# Patient Record
Sex: FEMALE | Race: BLACK OR AFRICAN AMERICAN | NOT HISPANIC OR LATINO
[De-identification: names, ages, dates, MRNs, and addresses within clinical notes are randomized per-mention and may not be internally consistent; named-entity substitution may affect disease eponyms.]

---

## 2017-01-03 ENCOUNTER — APPOINTMENT (OUTPATIENT)
Dept: PULMONOLOGY | Facility: CLINIC | Age: 67
End: 2017-01-03

## 2017-01-03 VITALS — DIASTOLIC BLOOD PRESSURE: 64 MMHG | SYSTOLIC BLOOD PRESSURE: 118 MMHG

## 2017-01-03 VITALS — OXYGEN SATURATION: 97 % | DIASTOLIC BLOOD PRESSURE: 70 MMHG | HEART RATE: 82 BPM | SYSTOLIC BLOOD PRESSURE: 140 MMHG

## 2017-01-03 RX ORDER — OMALIZUMAB 202.5 MG/1.4ML
150 INJECTION, SOLUTION SUBCUTANEOUS
Qty: 45 | Refills: 0 | Status: COMPLETED | OUTPATIENT
Start: 2017-01-03

## 2017-01-03 RX ADMIN — OMALIZUMAB 0 MG: 202.5 INJECTION, SOLUTION SUBCUTANEOUS at 00:00

## 2017-01-04 ENCOUNTER — APPOINTMENT (OUTPATIENT)
Dept: PULMONOLOGY | Facility: CLINIC | Age: 67
End: 2017-01-04

## 2017-01-05 ENCOUNTER — APPOINTMENT (OUTPATIENT)
Dept: PULMONOLOGY | Facility: CLINIC | Age: 67
End: 2017-01-05

## 2017-01-17 ENCOUNTER — MED ADMIN CHARGE (OUTPATIENT)
Age: 67
End: 2017-01-17

## 2017-01-17 ENCOUNTER — APPOINTMENT (OUTPATIENT)
Dept: PULMONOLOGY | Facility: CLINIC | Age: 67
End: 2017-01-17

## 2017-01-17 VITALS
RESPIRATION RATE: 17 BRPM | BODY MASS INDEX: 27 KG/M2 | HEIGHT: 67 IN | SYSTOLIC BLOOD PRESSURE: 125 MMHG | HEART RATE: 71 BPM | DIASTOLIC BLOOD PRESSURE: 70 MMHG | OXYGEN SATURATION: 97 % | WEIGHT: 172 LBS

## 2017-01-17 RX ORDER — OMALIZUMAB 202.5 MG/1.4ML
150 INJECTION, SOLUTION SUBCUTANEOUS
Qty: 45 | Refills: 0 | Status: COMPLETED | OUTPATIENT
Start: 2017-01-17

## 2017-01-17 RX ADMIN — OMALIZUMAB 0 MG: 202.5 INJECTION, SOLUTION SUBCUTANEOUS at 00:00

## 2017-01-26 ENCOUNTER — OUTPATIENT (OUTPATIENT)
Dept: OUTPATIENT SERVICES | Facility: HOSPITAL | Age: 67
LOS: 1 days | End: 2017-01-26
Payer: MEDICARE

## 2017-01-26 ENCOUNTER — APPOINTMENT (OUTPATIENT)
Dept: THORACIC SURGERY | Facility: CLINIC | Age: 67
End: 2017-01-26

## 2017-01-26 VITALS
RESPIRATION RATE: 16 BRPM | HEART RATE: 80 BPM | OXYGEN SATURATION: 93 % | SYSTOLIC BLOOD PRESSURE: 150 MMHG | DIASTOLIC BLOOD PRESSURE: 67 MMHG | BODY MASS INDEX: 27.62 KG/M2 | TEMPERATURE: 98.6 F | HEIGHT: 67 IN | WEIGHT: 176 LBS

## 2017-01-26 DIAGNOSIS — Z98.89 OTHER SPECIFIED POSTPROCEDURAL STATES: Chronic | ICD-10-CM

## 2017-01-26 DIAGNOSIS — H05.352 EXOSTOSIS OF LEFT ORBIT: Chronic | ICD-10-CM

## 2017-01-26 DIAGNOSIS — Z96.653 PRESENCE OF ARTIFICIAL KNEE JOINT, BILATERAL: Chronic | ICD-10-CM

## 2017-01-26 PROCEDURE — 71020: CPT | Mod: 26

## 2017-01-26 PROCEDURE — 71046 X-RAY EXAM CHEST 2 VIEWS: CPT

## 2017-01-31 ENCOUNTER — APPOINTMENT (OUTPATIENT)
Dept: PULMONOLOGY | Facility: CLINIC | Age: 67
End: 2017-01-31

## 2017-01-31 VITALS
SYSTOLIC BLOOD PRESSURE: 140 MMHG | WEIGHT: 176 LBS | HEART RATE: 74 BPM | HEIGHT: 67 IN | OXYGEN SATURATION: 94 % | DIASTOLIC BLOOD PRESSURE: 70 MMHG | BODY MASS INDEX: 27.62 KG/M2 | RESPIRATION RATE: 17 BRPM

## 2017-01-31 RX ORDER — OMALIZUMAB 202.5 MG/1.4ML
150 INJECTION, SOLUTION SUBCUTANEOUS
Qty: 45 | Refills: 0 | Status: COMPLETED | OUTPATIENT
Start: 2017-01-31

## 2017-01-31 RX ADMIN — OMALIZUMAB 0 MG: 202.5 INJECTION, SOLUTION SUBCUTANEOUS at 00:00

## 2017-02-03 ENCOUNTER — INPATIENT (INPATIENT)
Facility: HOSPITAL | Age: 67
LOS: 7 days | Discharge: HOME | DRG: 871 | End: 2017-02-11
Attending: INTERNAL MEDICINE | Admitting: INTERNAL MEDICINE
Payer: MEDICARE

## 2017-02-03 VITALS
SYSTOLIC BLOOD PRESSURE: 136 MMHG | RESPIRATION RATE: 20 BRPM | OXYGEN SATURATION: 95 % | DIASTOLIC BLOOD PRESSURE: 83 MMHG | HEART RATE: 109 BPM | TEMPERATURE: 101 F

## 2017-02-03 DIAGNOSIS — J18.1 LOBAR PNEUMONIA, UNSPECIFIED ORGANISM: ICD-10-CM

## 2017-02-03 DIAGNOSIS — E11.9 TYPE 2 DIABETES MELLITUS WITHOUT COMPLICATIONS: ICD-10-CM

## 2017-02-03 DIAGNOSIS — Z98.89 OTHER SPECIFIED POSTPROCEDURAL STATES: Chronic | ICD-10-CM

## 2017-02-03 DIAGNOSIS — I48.91 UNSPECIFIED ATRIAL FIBRILLATION: ICD-10-CM

## 2017-02-03 DIAGNOSIS — Z96.653 PRESENCE OF ARTIFICIAL KNEE JOINT, BILATERAL: Chronic | ICD-10-CM

## 2017-02-03 DIAGNOSIS — H05.352 EXOSTOSIS OF LEFT ORBIT: Chronic | ICD-10-CM

## 2017-02-03 LAB
ALBUMIN SERPL ELPH-MCNC: 3.8 G/DL — SIGNIFICANT CHANGE UP (ref 3.3–5)
ALP SERPL-CCNC: 110 U/L — SIGNIFICANT CHANGE UP (ref 40–120)
ALT FLD-CCNC: 15 U/L RC — SIGNIFICANT CHANGE UP (ref 10–45)
ANION GAP SERPL CALC-SCNC: 12 MMOL/L — SIGNIFICANT CHANGE UP (ref 5–17)
APTT BLD: 38.5 SEC — HIGH (ref 27.5–37.4)
AST SERPL-CCNC: 16 U/L — SIGNIFICANT CHANGE UP (ref 10–40)
BASOPHILS # BLD AUTO: 0 K/UL — SIGNIFICANT CHANGE UP (ref 0–0.2)
BASOPHILS NFR BLD AUTO: 0.1 % — SIGNIFICANT CHANGE UP (ref 0–2)
BILIRUB SERPL-MCNC: 0.3 MG/DL — SIGNIFICANT CHANGE UP (ref 0.2–1.2)
BUN SERPL-MCNC: 9 MG/DL — SIGNIFICANT CHANGE UP (ref 7–23)
CALCIUM SERPL-MCNC: 8.8 MG/DL — SIGNIFICANT CHANGE UP (ref 8.4–10.5)
CHLORIDE SERPL-SCNC: 101 MMOL/L — SIGNIFICANT CHANGE UP (ref 96–108)
CO2 SERPL-SCNC: 27 MMOL/L — SIGNIFICANT CHANGE UP (ref 22–31)
CREAT SERPL-MCNC: 0.66 MG/DL — SIGNIFICANT CHANGE UP (ref 0.5–1.3)
DIGOXIN SERPL-MCNC: 0.7 NG/ML — LOW (ref 0.8–2)
EOSINOPHIL # BLD AUTO: 0.3 K/UL — SIGNIFICANT CHANGE UP (ref 0–0.5)
EOSINOPHIL NFR BLD AUTO: 1.4 % — SIGNIFICANT CHANGE UP (ref 0–6)
GAS PNL BLDV: SIGNIFICANT CHANGE UP
GLUCOSE SERPL-MCNC: 166 MG/DL — HIGH (ref 70–99)
HCT VFR BLD CALC: 34.1 % — LOW (ref 34.5–45)
HGB BLD-MCNC: 10.5 G/DL — LOW (ref 11.5–15.5)
INR BLD: 1.37 RATIO — HIGH (ref 0.88–1.16)
LYMPHOCYTES # BLD AUTO: 21.6 % — SIGNIFICANT CHANGE UP (ref 13–44)
LYMPHOCYTES # BLD AUTO: 4.3 K/UL — HIGH (ref 1–3.3)
MCHC RBC-ENTMCNC: 20.9 PG — LOW (ref 27–34)
MCHC RBC-ENTMCNC: 30.7 GM/DL — LOW (ref 32–36)
MCV RBC AUTO: 68.1 FL — LOW (ref 80–100)
MONOCYTES # BLD AUTO: 2 K/UL — HIGH (ref 0–0.9)
MONOCYTES NFR BLD AUTO: 10.2 % — SIGNIFICANT CHANGE UP (ref 2–14)
NEUTROPHILS # BLD AUTO: 13.2 K/UL — HIGH (ref 1.8–7.4)
NEUTROPHILS NFR BLD AUTO: 66.8 % — SIGNIFICANT CHANGE UP (ref 43–77)
PLATELET # BLD AUTO: 419 K/UL — HIGH (ref 150–400)
POTASSIUM SERPL-MCNC: 3.9 MMOL/L — SIGNIFICANT CHANGE UP (ref 3.5–5.3)
POTASSIUM SERPL-SCNC: 3.9 MMOL/L — SIGNIFICANT CHANGE UP (ref 3.5–5.3)
PROT SERPL-MCNC: 6.9 G/DL — SIGNIFICANT CHANGE UP (ref 6–8.3)
PROTHROM AB SERPL-ACNC: 14.8 SEC — HIGH (ref 10–13.1)
RAPID RVP RESULT: SIGNIFICANT CHANGE UP
RBC # BLD: 5 M/UL — SIGNIFICANT CHANGE UP (ref 3.8–5.2)
RBC # FLD: 16.1 % — HIGH (ref 10.3–14.5)
SODIUM SERPL-SCNC: 140 MMOL/L — SIGNIFICANT CHANGE UP (ref 135–145)
WBC # BLD: 19.7 K/UL — HIGH (ref 3.8–10.5)
WBC # FLD AUTO: 19.7 K/UL — HIGH (ref 3.8–10.5)

## 2017-02-03 PROCEDURE — 71010: CPT | Mod: 26

## 2017-02-03 PROCEDURE — 99291 CRITICAL CARE FIRST HOUR: CPT | Mod: 25,GC

## 2017-02-03 PROCEDURE — 99223 1ST HOSP IP/OBS HIGH 75: CPT

## 2017-02-03 PROCEDURE — 71250 CT THORAX DX C-: CPT | Mod: 26

## 2017-02-03 PROCEDURE — 93010 ELECTROCARDIOGRAM REPORT: CPT

## 2017-02-03 RX ORDER — POLYETHYLENE GLYCOL 3350 17 G/17G
17 POWDER, FOR SOLUTION ORAL DAILY
Qty: 0 | Refills: 0 | Status: DISCONTINUED | OUTPATIENT
Start: 2017-02-03 | End: 2017-02-11

## 2017-02-03 RX ORDER — ALBUTEROL 90 UG/1
1 AEROSOL, METERED ORAL EVERY 4 HOURS
Qty: 0 | Refills: 0 | Status: DISCONTINUED | OUTPATIENT
Start: 2017-02-03 | End: 2017-02-11

## 2017-02-03 RX ORDER — INSULIN LISPRO 100/ML
3 VIAL (ML) SUBCUTANEOUS
Qty: 0 | Refills: 0 | Status: DISCONTINUED | OUTPATIENT
Start: 2017-02-03 | End: 2017-02-05

## 2017-02-03 RX ORDER — APIXABAN 2.5 MG/1
5 TABLET, FILM COATED ORAL
Qty: 0 | Refills: 0 | Status: DISCONTINUED | OUTPATIENT
Start: 2017-02-03 | End: 2017-02-11

## 2017-02-03 RX ORDER — MEROPENEM 1 G/30ML
1000 INJECTION INTRAVENOUS ONCE
Qty: 0 | Refills: 0 | Status: COMPLETED | OUTPATIENT
Start: 2017-02-03 | End: 2017-02-03

## 2017-02-03 RX ORDER — DIGOXIN 250 MCG
0.25 TABLET ORAL DAILY
Qty: 0 | Refills: 0 | Status: DISCONTINUED | OUTPATIENT
Start: 2017-02-03 | End: 2017-02-11

## 2017-02-03 RX ORDER — INSULIN LISPRO 100/ML
4 VIAL (ML) SUBCUTANEOUS ONCE
Qty: 0 | Refills: 0 | Status: COMPLETED | OUTPATIENT
Start: 2017-02-03 | End: 2017-02-03

## 2017-02-03 RX ORDER — FLUTICASONE PROPIONATE AND SALMETEROL 50; 250 UG/1; UG/1
1 POWDER ORAL; RESPIRATORY (INHALATION)
Qty: 0 | Refills: 0 | Status: DISCONTINUED | OUTPATIENT
Start: 2017-02-03 | End: 2017-02-03

## 2017-02-03 RX ORDER — VANCOMYCIN HCL 1 G
1000 VIAL (EA) INTRAVENOUS EVERY 12 HOURS
Qty: 0 | Refills: 0 | Status: DISCONTINUED | OUTPATIENT
Start: 2017-02-03 | End: 2017-02-06

## 2017-02-03 RX ORDER — TIOTROPIUM BROMIDE 18 UG/1
1 CAPSULE ORAL; RESPIRATORY (INHALATION) DAILY
Qty: 0 | Refills: 0 | Status: DISCONTINUED | OUTPATIENT
Start: 2017-02-03 | End: 2017-02-11

## 2017-02-03 RX ORDER — SODIUM CHLORIDE 9 MG/ML
1000 INJECTION, SOLUTION INTRAVENOUS
Qty: 0 | Refills: 0 | Status: DISCONTINUED | OUTPATIENT
Start: 2017-02-03 | End: 2017-02-11

## 2017-02-03 RX ORDER — SODIUM CHLORIDE 9 MG/ML
2000 INJECTION INTRAMUSCULAR; INTRAVENOUS; SUBCUTANEOUS ONCE
Qty: 0 | Refills: 0 | Status: COMPLETED | OUTPATIENT
Start: 2017-02-03 | End: 2017-02-03

## 2017-02-03 RX ORDER — FLUTICASONE PROPIONATE AND SALMETEROL 50; 250 UG/1; UG/1
2 POWDER ORAL; RESPIRATORY (INHALATION)
Qty: 0 | Refills: 0 | Status: DISCONTINUED | OUTPATIENT
Start: 2017-02-03 | End: 2017-02-03

## 2017-02-03 RX ORDER — IPRATROPIUM/ALBUTEROL SULFATE 18-103MCG
3 AEROSOL WITH ADAPTER (GRAM) INHALATION EVERY 6 HOURS
Qty: 0 | Refills: 0 | Status: DISCONTINUED | OUTPATIENT
Start: 2017-02-03 | End: 2017-02-11

## 2017-02-03 RX ORDER — ONDANSETRON 8 MG/1
4 TABLET, FILM COATED ORAL EVERY 6 HOURS
Qty: 0 | Refills: 0 | Status: DISCONTINUED | OUTPATIENT
Start: 2017-02-03 | End: 2017-02-11

## 2017-02-03 RX ORDER — VANCOMYCIN HCL 1 G
1000 VIAL (EA) INTRAVENOUS ONCE
Qty: 0 | Refills: 0 | Status: COMPLETED | OUTPATIENT
Start: 2017-02-03 | End: 2017-02-03

## 2017-02-03 RX ORDER — INSULIN GLARGINE 100 [IU]/ML
10 INJECTION, SOLUTION SUBCUTANEOUS AT BEDTIME
Qty: 0 | Refills: 0 | Status: DISCONTINUED | OUTPATIENT
Start: 2017-02-03 | End: 2017-02-05

## 2017-02-03 RX ORDER — LORATADINE 10 MG/1
10 TABLET ORAL DAILY
Qty: 0 | Refills: 0 | Status: DISCONTINUED | OUTPATIENT
Start: 2017-02-03 | End: 2017-02-11

## 2017-02-03 RX ORDER — DEXTROSE 50 % IN WATER 50 %
25 SYRINGE (ML) INTRAVENOUS ONCE
Qty: 0 | Refills: 0 | Status: DISCONTINUED | OUTPATIENT
Start: 2017-02-03 | End: 2017-02-11

## 2017-02-03 RX ORDER — MONTELUKAST 4 MG/1
10 TABLET, CHEWABLE ORAL DAILY
Qty: 0 | Refills: 0 | Status: DISCONTINUED | OUTPATIENT
Start: 2017-02-03 | End: 2017-02-11

## 2017-02-03 RX ORDER — DEXTROSE 50 % IN WATER 50 %
12.5 SYRINGE (ML) INTRAVENOUS ONCE
Qty: 0 | Refills: 0 | Status: DISCONTINUED | OUTPATIENT
Start: 2017-02-03 | End: 2017-02-11

## 2017-02-03 RX ORDER — INSULIN LISPRO 100/ML
VIAL (ML) SUBCUTANEOUS
Qty: 0 | Refills: 0 | Status: DISCONTINUED | OUTPATIENT
Start: 2017-02-03 | End: 2017-02-11

## 2017-02-03 RX ORDER — ALPRAZOLAM 0.25 MG
0.5 TABLET ORAL THREE TIMES A DAY
Qty: 0 | Refills: 0 | Status: DISCONTINUED | OUTPATIENT
Start: 2017-02-03 | End: 2017-02-10

## 2017-02-03 RX ORDER — HYDROCORTISONE 1 %
1 OINTMENT (GRAM) TOPICAL DAILY
Qty: 0 | Refills: 0 | Status: DISCONTINUED | OUTPATIENT
Start: 2017-02-03 | End: 2017-02-11

## 2017-02-03 RX ORDER — PANTOPRAZOLE SODIUM 20 MG/1
40 TABLET, DELAYED RELEASE ORAL
Qty: 0 | Refills: 0 | Status: DISCONTINUED | OUTPATIENT
Start: 2017-02-03 | End: 2017-02-11

## 2017-02-03 RX ORDER — DEXTROSE 50 % IN WATER 50 %
1 SYRINGE (ML) INTRAVENOUS ONCE
Qty: 0 | Refills: 0 | Status: DISCONTINUED | OUTPATIENT
Start: 2017-02-03 | End: 2017-02-11

## 2017-02-03 RX ORDER — DOCUSATE SODIUM 100 MG
100 CAPSULE ORAL
Qty: 0 | Refills: 0 | Status: DISCONTINUED | OUTPATIENT
Start: 2017-02-03 | End: 2017-02-11

## 2017-02-03 RX ORDER — FLUTICASONE PROPIONATE AND SALMETEROL 50; 250 UG/1; UG/1
1 POWDER ORAL; RESPIRATORY (INHALATION)
Qty: 0 | Refills: 0 | Status: DISCONTINUED | OUTPATIENT
Start: 2017-02-03 | End: 2017-02-11

## 2017-02-03 RX ORDER — GLUCAGON INJECTION, SOLUTION 0.5 MG/.1ML
1 INJECTION, SOLUTION SUBCUTANEOUS ONCE
Qty: 0 | Refills: 0 | Status: DISCONTINUED | OUTPATIENT
Start: 2017-02-03 | End: 2017-02-11

## 2017-02-03 RX ORDER — MEROPENEM 1 G/30ML
1000 INJECTION INTRAVENOUS EVERY 8 HOURS
Qty: 0 | Refills: 0 | Status: DISCONTINUED | OUTPATIENT
Start: 2017-02-03 | End: 2017-02-11

## 2017-02-03 RX ADMIN — Medication 250 MILLIGRAM(S): at 17:52

## 2017-02-03 RX ADMIN — POLYETHYLENE GLYCOL 3350 17 GRAM(S): 17 POWDER, FOR SOLUTION ORAL at 22:06

## 2017-02-03 RX ADMIN — Medication 4 UNIT(S): at 22:19

## 2017-02-03 RX ADMIN — Medication 1 SUPPOSITORY(S): at 23:26

## 2017-02-03 RX ADMIN — Medication 100 MILLIGRAM(S): at 17:52

## 2017-02-03 RX ADMIN — Medication 0.5 MILLIGRAM(S): at 22:20

## 2017-02-03 RX ADMIN — Medication 150 MILLIGRAM(S): at 17:52

## 2017-02-03 RX ADMIN — Medication 250 MILLIGRAM(S): at 12:30

## 2017-02-03 RX ADMIN — MEROPENEM 200 MILLIGRAM(S): 1 INJECTION INTRAVENOUS at 14:37

## 2017-02-03 RX ADMIN — Medication 40 MILLIGRAM(S): at 12:28

## 2017-02-03 RX ADMIN — APIXABAN 5 MILLIGRAM(S): 2.5 TABLET, FILM COATED ORAL at 17:54

## 2017-02-03 RX ADMIN — INSULIN GLARGINE 10 UNIT(S): 100 INJECTION, SOLUTION SUBCUTANEOUS at 22:03

## 2017-02-03 RX ADMIN — Medication 3: at 17:52

## 2017-02-03 RX ADMIN — MEROPENEM 200 MILLIGRAM(S): 1 INJECTION INTRAVENOUS at 22:19

## 2017-02-03 RX ADMIN — Medication 3 UNIT(S): at 17:52

## 2017-02-03 RX ADMIN — Medication 3 MILLILITER(S): at 19:41

## 2017-02-03 RX ADMIN — FLUTICASONE PROPIONATE AND SALMETEROL 1 DOSE(S): 50; 250 POWDER ORAL; RESPIRATORY (INHALATION) at 22:13

## 2017-02-03 RX ADMIN — SODIUM CHLORIDE 2000 MILLILITER(S): 9 INJECTION INTRAMUSCULAR; INTRAVENOUS; SUBCUTANEOUS at 12:20

## 2017-02-03 RX ADMIN — Medication 0.25 MILLIGRAM(S): at 17:52

## 2017-02-03 NOTE — ED ADULT NURSE REASSESSMENT NOTE - NS ED NURSE REASSESS COMMENT FT1
Pharmacy paged to send Meropenum to the ED, awaiting the medication via the tubing system. Will continue to monitor pt.

## 2017-02-03 NOTE — ED PROVIDER NOTE - FAMILY HISTORY
<<-----Click on this checkbox to enter Family History Family history of asthma     Sibling  Still living? Unknown  Family history of breast cancer, Age at diagnosis: Age Unknown

## 2017-02-03 NOTE — ED PROVIDER NOTE - OBJECTIVE STATEMENT
67 yo Fwith PMH of COPD/asthma and tracheomalacia s/p intubation and cardiac arrest (remote hx),  Afib on pradaxa, Adrenal Insuffiiency,  DM2,  who presents to the hospital with 2 week history of shortness of breath.  Patient was recently in ed with PNA treated with meropenem and discharged home.  She had returned to her normal state of health and had been going to outpatient PT.  Patient reportedly began having increased shortness of breath and productive cough with yellow sputum.  patient noted a fever today to 101, so she came to the hospital for further care.  No chest pain, headache, lightheadedness, dizziness, abdomianl pain, nausea, vomiting, diarrhea, constipation or dysuria.

## 2017-02-03 NOTE — ED ADULT NURSE NOTE - PMH
Adrenal insufficiency    Aortic insufficiency  mod/severe AI on echo 9/25/16  Asthma    Atrial fibrillation    COPD (chronic obstructive pulmonary disease)    Diabetes  type two. insulin dependent  Pelvic fracture

## 2017-02-03 NOTE — ED PROVIDER NOTE - PROGRESS NOTE DETAILS
Dr. Anne Note: pt appears improved, no respiratory distress, updated on results and need for admission for pneumonia, called pt's pulmonologist, Dr. Allison, will send associate to see patient later today for further recommendations, stable for admission.

## 2017-02-03 NOTE — ED PROVIDER NOTE - MEDICAL DECISION MAKING DETAILS
Pt with complex PMH, recent hospitalization for PNA.  Will check labs, CXR and CT chest,  will aggressively hydrate, panculture, empiric antibiotics, stress steroids

## 2017-02-03 NOTE — ED PROVIDER NOTE - ATTENDING CONTRIBUTION TO CARE
I, Dr. Ahmet Anne, interviewed the patient and performed a physical exam and spoke to the resident about the plan of care for this patient.  Pt with productive cough with blood-tinged sputum today assoc with dyspnea and fever.  Pt appears dry, no respiratory distress, coarse bs.  Pt has extensive medical/pulmonary disease, on chronic steroids, on a/c for afib.

## 2017-02-03 NOTE — ED PROVIDER NOTE - CRITICAL CARE PROVIDED
consultation with other physicians/interpretation of diagnostic studies/direct patient care (not related to procedure)/additional history taking/documentation

## 2017-02-03 NOTE — ED ADULT NURSE NOTE - PSH
Exostosis of orbit, left    H/O pelvic surgery    H/O total knee replacement, bilateral    History of partial hysterectomy    History of sinus surgery

## 2017-02-03 NOTE — H&P ADULT. - ASSESSMENT
66 yr F with PMH of COPD, asthma and tracheomalacia s/p intubation and cardiac arrest in the past , Afib on pradaxa, Adrenal Insuffiiency,  DM2,  who presents to the hospital with 2 week history of shortness of breath.  Patient was recently in ed with PNA treated with meropenem and discharged home.  She had returned to her normal state of health and had been going to outpatient PT.  Patient reportedly began having increased shortness of breath and productive cough with yellow sputum.  patient noted a fever today to 101, so she came to the hospital for further care.  No chest pain, headache, lightheadedness, dizziness, abdominal pain, nausea, vomiting, diarrhea, constipation or dysuria.

## 2017-02-03 NOTE — ED ADULT NURSE NOTE - OBJECTIVE STATEMENT
65 y/o F, reported to ED from home. A&Ox3, c/o fever. Pt reports that she woke up this morning with chills. Pt took her own temperature and it was 102. Pt came to ED with a temp of 101. Pt reports that she has been experiencing increased SOB over the past 3 days. Pt denies LOC, H/A, C/P, V/D, abd pain. Pt denies urinary symptoms. Pt reports some coughing and reports coughing up a small dot of blood 1x yesterday and 1x today. Pt denies recent sick contact or travel. Pt reports some increase pain in the right lower leg beneath a wound that was treated with skin graft. Pt denies taking medication prior to arrival to the ED. Pt has a hx of aortic tear, skin graft to the right lower leg and bronchial repair. Daughter at bedside. Will continue to monitor pt.

## 2017-02-04 LAB
ALBUMIN SERPL ELPH-MCNC: 3.3 G/DL — SIGNIFICANT CHANGE UP (ref 3.3–5)
ALP SERPL-CCNC: 103 U/L — SIGNIFICANT CHANGE UP (ref 40–120)
ALT FLD-CCNC: 13 U/L — SIGNIFICANT CHANGE UP (ref 10–45)
ANION GAP SERPL CALC-SCNC: 15 MMOL/L — SIGNIFICANT CHANGE UP (ref 5–17)
AST SERPL-CCNC: 16 U/L — SIGNIFICANT CHANGE UP (ref 10–40)
BILIRUB SERPL-MCNC: 0.2 MG/DL — SIGNIFICANT CHANGE UP (ref 0.2–1.2)
BUN SERPL-MCNC: 11 MG/DL — SIGNIFICANT CHANGE UP (ref 7–23)
CALCIUM SERPL-MCNC: 8.9 MG/DL — SIGNIFICANT CHANGE UP (ref 8.4–10.5)
CHLORIDE SERPL-SCNC: 103 MMOL/L — SIGNIFICANT CHANGE UP (ref 96–108)
CO2 SERPL-SCNC: 24 MMOL/L — SIGNIFICANT CHANGE UP (ref 22–31)
CREAT SERPL-MCNC: 0.65 MG/DL — SIGNIFICANT CHANGE UP (ref 0.5–1.3)
GLUCOSE SERPL-MCNC: 217 MG/DL — HIGH (ref 70–99)
GRAM STN FLD: SIGNIFICANT CHANGE UP
HBA1C BLD-MCNC: 7.9 % — HIGH (ref 4–5.6)
HCT VFR BLD CALC: 30 % — LOW (ref 34.5–45)
HGB BLD-MCNC: 9.1 G/DL — LOW (ref 11.5–15.5)
MCHC RBC-ENTMCNC: 20.6 PG — LOW (ref 27–34)
MCHC RBC-ENTMCNC: 30.3 GM/DL — LOW (ref 32–36)
MCV RBC AUTO: 67.9 FL — LOW (ref 80–100)
PLATELET # BLD AUTO: 387 K/UL — SIGNIFICANT CHANGE UP (ref 150–400)
POTASSIUM SERPL-MCNC: 4.2 MMOL/L — SIGNIFICANT CHANGE UP (ref 3.5–5.3)
POTASSIUM SERPL-SCNC: 4.2 MMOL/L — SIGNIFICANT CHANGE UP (ref 3.5–5.3)
PROT SERPL-MCNC: 6.3 G/DL — SIGNIFICANT CHANGE UP (ref 6–8.3)
RBC # BLD: 4.42 M/UL — SIGNIFICANT CHANGE UP (ref 3.8–5.2)
RBC # FLD: 17.3 % — HIGH (ref 10.3–14.5)
SODIUM SERPL-SCNC: 142 MMOL/L — SIGNIFICANT CHANGE UP (ref 135–145)
SPECIMEN SOURCE: SIGNIFICANT CHANGE UP
WBC # BLD: 20.13 K/UL — HIGH (ref 3.8–10.5)
WBC # FLD AUTO: 20.13 K/UL — HIGH (ref 3.8–10.5)

## 2017-02-04 RX ORDER — INSULIN LISPRO 100/ML
4 VIAL (ML) SUBCUTANEOUS ONCE
Qty: 0 | Refills: 0 | Status: COMPLETED | OUTPATIENT
Start: 2017-02-04 | End: 2017-02-04

## 2017-02-04 RX ORDER — SODIUM CHLORIDE 9 MG/ML
4 INJECTION INTRAMUSCULAR; INTRAVENOUS; SUBCUTANEOUS
Qty: 0 | Refills: 0 | Status: DISCONTINUED | OUTPATIENT
Start: 2017-02-04 | End: 2017-02-11

## 2017-02-04 RX ADMIN — MONTELUKAST 10 MILLIGRAM(S): 4 TABLET, CHEWABLE ORAL at 12:30

## 2017-02-04 RX ADMIN — Medication 3 MILLILITER(S): at 02:07

## 2017-02-04 RX ADMIN — Medication 1: at 12:29

## 2017-02-04 RX ADMIN — Medication 150 MILLIGRAM(S): at 17:36

## 2017-02-04 RX ADMIN — INSULIN GLARGINE 10 UNIT(S): 100 INJECTION, SOLUTION SUBCUTANEOUS at 21:53

## 2017-02-04 RX ADMIN — Medication 100 MILLIGRAM(S): at 05:58

## 2017-02-04 RX ADMIN — Medication 20 MILLIGRAM(S): at 12:29

## 2017-02-04 RX ADMIN — Medication 100 MILLIGRAM(S): at 17:36

## 2017-02-04 RX ADMIN — Medication 250 MILLIGRAM(S): at 17:36

## 2017-02-04 RX ADMIN — Medication 4 UNIT(S): at 22:58

## 2017-02-04 RX ADMIN — Medication 2: at 17:36

## 2017-02-04 RX ADMIN — Medication 20 MILLIGRAM(S): at 21:47

## 2017-02-04 RX ADMIN — Medication 1 SUPPOSITORY(S): at 21:48

## 2017-02-04 RX ADMIN — POLYETHYLENE GLYCOL 3350 17 GRAM(S): 17 POWDER, FOR SOLUTION ORAL at 21:42

## 2017-02-04 RX ADMIN — Medication 3 UNIT(S): at 08:00

## 2017-02-04 RX ADMIN — MEROPENEM 200 MILLIGRAM(S): 1 INJECTION INTRAVENOUS at 08:12

## 2017-02-04 RX ADMIN — Medication 250 MILLIGRAM(S): at 05:57

## 2017-02-04 RX ADMIN — FLUTICASONE PROPIONATE AND SALMETEROL 1 DOSE(S): 50; 250 POWDER ORAL; RESPIRATORY (INHALATION) at 21:43

## 2017-02-04 RX ADMIN — Medication 3 UNIT(S): at 17:36

## 2017-02-04 RX ADMIN — Medication 2: at 08:00

## 2017-02-04 RX ADMIN — FLUTICASONE PROPIONATE AND SALMETEROL 1 DOSE(S): 50; 250 POWDER ORAL; RESPIRATORY (INHALATION) at 05:59

## 2017-02-04 RX ADMIN — MEROPENEM 200 MILLIGRAM(S): 1 INJECTION INTRAVENOUS at 21:48

## 2017-02-04 RX ADMIN — Medication 3 MILLILITER(S): at 12:29

## 2017-02-04 RX ADMIN — PANTOPRAZOLE SODIUM 40 MILLIGRAM(S): 20 TABLET, DELAYED RELEASE ORAL at 06:38

## 2017-02-04 RX ADMIN — APIXABAN 5 MILLIGRAM(S): 2.5 TABLET, FILM COATED ORAL at 17:36

## 2017-02-04 RX ADMIN — MEROPENEM 200 MILLIGRAM(S): 1 INJECTION INTRAVENOUS at 12:30

## 2017-02-04 RX ADMIN — LORATADINE 10 MILLIGRAM(S): 10 TABLET ORAL at 12:30

## 2017-02-04 RX ADMIN — Medication 0.25 MILLIGRAM(S): at 06:05

## 2017-02-04 RX ADMIN — Medication 3 UNIT(S): at 12:29

## 2017-02-04 RX ADMIN — Medication 3 MILLILITER(S): at 19:55

## 2017-02-04 RX ADMIN — APIXABAN 5 MILLIGRAM(S): 2.5 TABLET, FILM COATED ORAL at 05:57

## 2017-02-04 RX ADMIN — Medication 150 MILLIGRAM(S): at 06:05

## 2017-02-04 NOTE — DIETITIAN INITIAL EVALUATION ADULT. - OTHER INFO
Patient referred for A1c greater than 7.  Last A1c from October was 8.5.  Patient found sitting at bedside.  Somewhat irritable and frustrated with hospitalization.  Requests to have her prednisone ordered as well as her A1c be checked.  Appetite and intake is good.  Patient is very much in tune to diabetes, insulin, finger sticks and CHO counting.  Patient reports finger sticks at home are well controlled and she has an ongoing relationship with her endocrinologist. Insulin to CHO ratio is 1:20.  Patient is very frustrated with elevated fingersticks in hospital.  Patient eats 6 small meals per day and ALWAYS includes protein with her CHO. Eats very little bread, pasta, rice.  CHO source is usually fruit.  Patient very knowledgeable.  Requests an evening snack.  Reviewed protein choices with patient and advised her on food availability.  Weight has been fluctuating and was as low as 156 pounds last August- October due to illness.  Now back up to more normal weight range.

## 2017-02-04 NOTE — DIETITIAN INITIAL EVALUATION ADULT. - ENERGY NEEDS
HT 67.5 inches,  pounds,  pounds,  pounds.  BMI 26.8  Dxd with sepsis, PNA.  Hx of diabetes.    Skin intact.

## 2017-02-04 NOTE — DIETITIAN INITIAL EVALUATION ADULT. - NS AS NUTRI INTERV ED CONTENT
Reviewed diabetes management and reinforced positive behaviors and lifestyle management../Nutrition relationship to health/disease

## 2017-02-05 LAB
HCT VFR BLD CALC: 30.8 % — LOW (ref 34.5–45)
HGB BLD-MCNC: 9.1 G/DL — LOW (ref 11.5–15.5)
LEGIONELLA AG UR QL: NEGATIVE — SIGNIFICANT CHANGE UP
MCHC RBC-ENTMCNC: 20.3 PG — LOW (ref 27–34)
MCHC RBC-ENTMCNC: 29.5 GM/DL — LOW (ref 32–36)
MCV RBC AUTO: 68.6 FL — LOW (ref 80–100)
PLATELET # BLD AUTO: 435 K/UL — HIGH (ref 150–400)
RBC # BLD: 4.49 M/UL — SIGNIFICANT CHANGE UP (ref 3.8–5.2)
RBC # FLD: 17.1 % — HIGH (ref 10.3–14.5)
VANCOMYCIN TROUGH SERPL-MCNC: 6.3 UG/ML — LOW (ref 10–20)
WBC # BLD: 13.81 K/UL — HIGH (ref 3.8–10.5)
WBC # FLD AUTO: 13.81 K/UL — HIGH (ref 3.8–10.5)

## 2017-02-05 RX ORDER — INSULIN GLARGINE 100 [IU]/ML
15 INJECTION, SOLUTION SUBCUTANEOUS AT BEDTIME
Qty: 0 | Refills: 0 | Status: DISCONTINUED | OUTPATIENT
Start: 2017-02-05 | End: 2017-02-07

## 2017-02-05 RX ORDER — INSULIN LISPRO 100/ML
VIAL (ML) SUBCUTANEOUS AT BEDTIME
Qty: 0 | Refills: 0 | Status: DISCONTINUED | OUTPATIENT
Start: 2017-02-05 | End: 2017-02-11

## 2017-02-05 RX ORDER — POLYETHYLENE GLYCOL 3350 17 G/17G
17 POWDER, FOR SOLUTION ORAL ONCE
Qty: 0 | Refills: 0 | Status: COMPLETED | OUTPATIENT
Start: 2017-02-05 | End: 2017-02-05

## 2017-02-05 RX ORDER — INSULIN LISPRO 100/ML
5 VIAL (ML) SUBCUTANEOUS
Qty: 0 | Refills: 0 | Status: DISCONTINUED | OUTPATIENT
Start: 2017-02-05 | End: 2017-02-07

## 2017-02-05 RX ADMIN — Medication 3 UNIT(S): at 12:41

## 2017-02-05 RX ADMIN — Medication 3: at 22:28

## 2017-02-05 RX ADMIN — FLUTICASONE PROPIONATE AND SALMETEROL 1 DOSE(S): 50; 250 POWDER ORAL; RESPIRATORY (INHALATION) at 21:19

## 2017-02-05 RX ADMIN — SODIUM CHLORIDE 4 MILLILITER(S): 9 INJECTION INTRAMUSCULAR; INTRAVENOUS; SUBCUTANEOUS at 17:19

## 2017-02-05 RX ADMIN — Medication 3 UNIT(S): at 08:46

## 2017-02-05 RX ADMIN — Medication 2: at 17:17

## 2017-02-05 RX ADMIN — SODIUM CHLORIDE 4 MILLILITER(S): 9 INJECTION INTRAMUSCULAR; INTRAVENOUS; SUBCUTANEOUS at 08:47

## 2017-02-05 RX ADMIN — Medication 150 MILLIGRAM(S): at 17:25

## 2017-02-05 RX ADMIN — MEROPENEM 200 MILLIGRAM(S): 1 INJECTION INTRAVENOUS at 21:19

## 2017-02-05 RX ADMIN — POLYETHYLENE GLYCOL 3350 17 GRAM(S): 17 POWDER, FOR SOLUTION ORAL at 09:21

## 2017-02-05 RX ADMIN — MEROPENEM 200 MILLIGRAM(S): 1 INJECTION INTRAVENOUS at 05:34

## 2017-02-05 RX ADMIN — Medication 250 MILLIGRAM(S): at 05:35

## 2017-02-05 RX ADMIN — Medication 100 MILLIGRAM(S): at 17:19

## 2017-02-05 RX ADMIN — Medication 3 MILLILITER(S): at 21:11

## 2017-02-05 RX ADMIN — LORATADINE 10 MILLIGRAM(S): 10 TABLET ORAL at 12:09

## 2017-02-05 RX ADMIN — Medication 20 MILLIGRAM(S): at 05:36

## 2017-02-05 RX ADMIN — Medication 1 SUPPOSITORY(S): at 21:12

## 2017-02-05 RX ADMIN — Medication 3 MILLILITER(S): at 01:22

## 2017-02-05 RX ADMIN — MONTELUKAST 10 MILLIGRAM(S): 4 TABLET, CHEWABLE ORAL at 12:09

## 2017-02-05 RX ADMIN — Medication 250 MILLIGRAM(S): at 17:25

## 2017-02-05 RX ADMIN — APIXABAN 5 MILLIGRAM(S): 2.5 TABLET, FILM COATED ORAL at 05:37

## 2017-02-05 RX ADMIN — Medication 0.25 MILLIGRAM(S): at 05:39

## 2017-02-05 RX ADMIN — MEROPENEM 200 MILLIGRAM(S): 1 INJECTION INTRAVENOUS at 15:44

## 2017-02-05 RX ADMIN — Medication 4: at 08:45

## 2017-02-05 RX ADMIN — FLUTICASONE PROPIONATE AND SALMETEROL 1 DOSE(S): 50; 250 POWDER ORAL; RESPIRATORY (INHALATION) at 05:35

## 2017-02-05 RX ADMIN — Medication 20 MILLIGRAM(S): at 15:42

## 2017-02-05 RX ADMIN — Medication 0.5 MILLIGRAM(S): at 22:47

## 2017-02-05 RX ADMIN — PANTOPRAZOLE SODIUM 40 MILLIGRAM(S): 20 TABLET, DELAYED RELEASE ORAL at 06:58

## 2017-02-05 RX ADMIN — Medication 150 MILLIGRAM(S): at 05:39

## 2017-02-05 RX ADMIN — POLYETHYLENE GLYCOL 3350 17 GRAM(S): 17 POWDER, FOR SOLUTION ORAL at 23:09

## 2017-02-05 RX ADMIN — Medication 3 MILLILITER(S): at 08:47

## 2017-02-05 RX ADMIN — Medication 4: at 12:41

## 2017-02-05 RX ADMIN — Medication 3 MILLILITER(S): at 15:42

## 2017-02-05 RX ADMIN — APIXABAN 5 MILLIGRAM(S): 2.5 TABLET, FILM COATED ORAL at 17:19

## 2017-02-05 RX ADMIN — Medication 20 MILLIGRAM(S): at 21:20

## 2017-02-05 RX ADMIN — Medication 100 MILLIGRAM(S): at 05:36

## 2017-02-05 RX ADMIN — INSULIN GLARGINE 15 UNIT(S): 100 INJECTION, SOLUTION SUBCUTANEOUS at 22:46

## 2017-02-05 RX ADMIN — Medication 0.5 MILLIGRAM(S): at 00:46

## 2017-02-05 RX ADMIN — Medication 3 UNIT(S): at 17:18

## 2017-02-05 NOTE — PROVIDER CONTACT NOTE (OTHER) - ASSESSMENT
Pt alert and oriented x3, No respiratory distress noted. Pt requesting albuterol 90mcg HFA inhaler every 4 hours PRN.

## 2017-02-06 ENCOUNTER — TRANSCRIPTION ENCOUNTER (OUTPATIENT)
Age: 67
End: 2017-02-06

## 2017-02-06 LAB
CULTURE RESULTS: SIGNIFICANT CHANGE UP
HCT VFR BLD CALC: 31.5 % — LOW (ref 34.5–45)
HGB BLD-MCNC: 9.5 G/DL — LOW (ref 11.5–15.5)
MCHC RBC-ENTMCNC: 20.3 PG — LOW (ref 27–34)
MCHC RBC-ENTMCNC: 30.2 GM/DL — LOW (ref 32–36)
MCV RBC AUTO: 67.5 FL — LOW (ref 80–100)
PLATELET # BLD AUTO: 459 K/UL — HIGH (ref 150–400)
RBC # BLD: 4.67 M/UL — SIGNIFICANT CHANGE UP (ref 3.8–5.2)
RBC # FLD: 17.3 % — HIGH (ref 10.3–14.5)
SPECIMEN SOURCE: SIGNIFICANT CHANGE UP
WBC # BLD: 15.35 K/UL — HIGH (ref 3.8–10.5)
WBC # FLD AUTO: 15.35 K/UL — HIGH (ref 3.8–10.5)

## 2017-02-06 PROCEDURE — 99222 1ST HOSP IP/OBS MODERATE 55: CPT | Mod: GC

## 2017-02-06 PROCEDURE — 99233 SBSQ HOSP IP/OBS HIGH 50: CPT

## 2017-02-06 RX ORDER — HYDROCORTISONE 1 %
1 OINTMENT (GRAM) TOPICAL
Qty: 0 | Refills: 0 | Status: DISCONTINUED | OUTPATIENT
Start: 2017-02-06 | End: 2017-02-11

## 2017-02-06 RX ADMIN — Medication 5 UNIT(S): at 08:23

## 2017-02-06 RX ADMIN — Medication 100 MILLIGRAM(S): at 06:05

## 2017-02-06 RX ADMIN — APIXABAN 5 MILLIGRAM(S): 2.5 TABLET, FILM COATED ORAL at 17:42

## 2017-02-06 RX ADMIN — Medication 3 MILLILITER(S): at 15:19

## 2017-02-06 RX ADMIN — Medication 2: at 17:41

## 2017-02-06 RX ADMIN — LORATADINE 10 MILLIGRAM(S): 10 TABLET ORAL at 12:03

## 2017-02-06 RX ADMIN — MONTELUKAST 10 MILLIGRAM(S): 4 TABLET, CHEWABLE ORAL at 12:03

## 2017-02-06 RX ADMIN — Medication 0.25 MILLIGRAM(S): at 06:06

## 2017-02-06 RX ADMIN — POLYETHYLENE GLYCOL 3350 17 GRAM(S): 17 POWDER, FOR SOLUTION ORAL at 08:54

## 2017-02-06 RX ADMIN — SODIUM CHLORIDE 4 MILLILITER(S): 9 INJECTION INTRAMUSCULAR; INTRAVENOUS; SUBCUTANEOUS at 21:41

## 2017-02-06 RX ADMIN — Medication 3 MILLILITER(S): at 21:40

## 2017-02-06 RX ADMIN — INSULIN GLARGINE 15 UNIT(S): 100 INJECTION, SOLUTION SUBCUTANEOUS at 22:40

## 2017-02-06 RX ADMIN — Medication 5 UNIT(S): at 17:41

## 2017-02-06 RX ADMIN — Medication 3 MILLILITER(S): at 08:23

## 2017-02-06 RX ADMIN — MEROPENEM 200 MILLIGRAM(S): 1 INJECTION INTRAVENOUS at 06:03

## 2017-02-06 RX ADMIN — MEROPENEM 200 MILLIGRAM(S): 1 INJECTION INTRAVENOUS at 21:40

## 2017-02-06 RX ADMIN — SODIUM CHLORIDE 4 MILLILITER(S): 9 INJECTION INTRAMUSCULAR; INTRAVENOUS; SUBCUTANEOUS at 08:23

## 2017-02-06 RX ADMIN — Medication 5 UNIT(S): at 12:02

## 2017-02-06 RX ADMIN — Medication 1 SUPPOSITORY(S): at 21:41

## 2017-02-06 RX ADMIN — FLUTICASONE PROPIONATE AND SALMETEROL 1 DOSE(S): 50; 250 POWDER ORAL; RESPIRATORY (INHALATION) at 21:40

## 2017-02-06 RX ADMIN — PANTOPRAZOLE SODIUM 40 MILLIGRAM(S): 20 TABLET, DELAYED RELEASE ORAL at 06:11

## 2017-02-06 RX ADMIN — Medication 150 MILLIGRAM(S): at 17:45

## 2017-02-06 RX ADMIN — APIXABAN 5 MILLIGRAM(S): 2.5 TABLET, FILM COATED ORAL at 06:05

## 2017-02-06 RX ADMIN — Medication 16 MILLIGRAM(S): at 15:22

## 2017-02-06 RX ADMIN — Medication 3 MILLILITER(S): at 01:04

## 2017-02-06 RX ADMIN — Medication 250 MILLIGRAM(S): at 06:16

## 2017-02-06 RX ADMIN — MEROPENEM 200 MILLIGRAM(S): 1 INJECTION INTRAVENOUS at 13:16

## 2017-02-06 RX ADMIN — Medication 0.5 MILLIGRAM(S): at 23:38

## 2017-02-06 RX ADMIN — FLUTICASONE PROPIONATE AND SALMETEROL 1 DOSE(S): 50; 250 POWDER ORAL; RESPIRATORY (INHALATION) at 06:10

## 2017-02-06 RX ADMIN — ALBUTEROL 1 PUFF(S): 90 AEROSOL, METERED ORAL at 12:03

## 2017-02-06 RX ADMIN — Medication 150 MILLIGRAM(S): at 06:06

## 2017-02-06 RX ADMIN — Medication 100 MILLIGRAM(S): at 17:42

## 2017-02-06 RX ADMIN — Medication 3: at 22:40

## 2017-02-06 RX ADMIN — Medication 2: at 12:02

## 2017-02-06 RX ADMIN — Medication 3: at 08:23

## 2017-02-06 NOTE — DISCHARGE NOTE ADULT - CARE PROVIDER_API CALL
Eulalio Allison), Internal Medicine; Pulmonary Disease  [address] [place] [id number]  Phone: [phone]  Fax: [phone] Eulalio Allison), Internal Medicine; Pulmonary Disease  [address] [place] [address]  Phone: [phone]  Fax: [phone]    Lisandra Holloway), Allergy and Immunology  [address] [place] [id number]  Phone: [phone]  Fax: [phone]

## 2017-02-06 NOTE — DISCHARGE NOTE ADULT - CARE PROVIDERS DIRECT ADDRESSES
,[email or web address],DirectAddress_Unknown ,[email or web address],DirectAddress_Unknown

## 2017-02-06 NOTE — DISCHARGE NOTE ADULT - MEDICATION SUMMARY - MEDICATIONS TO CHANGE
I will SWITCH the dose or number of times a day I take the medications listed below when I get home from the hospital:    methylPREDNISolone 4 mg oral tablet  -- 1 tab(s) by mouth once a day

## 2017-02-06 NOTE — DISCHARGE NOTE ADULT - PLAN OF CARE
symptoms improved You will take Ceftin 500mg oral twice a day x 7 days, and then discontinue - take until course is complete  Pneumonia is a lung infection that can cause a fever, cough, and trouble breathing.  Continue all antibiotics as ordered until complete.  Nutrition is important, eat small frequent meals.  Get lots of rest and drink fluids.  Call your health care provider upon arrival home from hospital and make a follow up appointment for one week.  If your cough worsens, you develop fever greater than 101', you have shaking chills, a fast heartbeat, trouble breathing and/or feel your are breathing much faster than usual, call your healthcare provider.  Make sure you wash your hands frequently. Atrial fibrillation is the most common heart rhythm problem.  The condition puts you at risk for has stroke and heart attack  It helps if you control your blood pressure, not drink more than 1-2 alcohol drinks per day, cut down on caffeine, getting treatment for over active thyroid gland, and get regular exercise  Call your doctor if you feel your heart racing or beating unusually, chest tightness or pain, lightheaded, faint, shortness of breath especially with exercise  It is important to take your heart medication as prescribed  You may be on anticoagulation which is very important to take as directed - you may need blood work to monitor drug levels Your steroids were increased during hospitalization - you will take Medrol 8mg oral daily x 5 days (through 2/15/17) and then return to your original dose of 4mg oral daily (starting on 2/16/17).  You will need to follow up with your pulmonologist, Dr. Allison, [phone], within one week of discharge - please call to make an appointment. HgA1C this admission.  Make sure you get your HgA1c checked every three months.  If you take oral diabetes medications, check your blood glucose two times a day.  If you take insulin, check your blood glucose before meals and at bedtime.  It's important not to skip any meals.  Keep a log of your blood glucose results and always take it with you to your doctor appointments.  Keep a list of your current medications including injectables and over the counter medications and bring this medication list with you to all your doctor appointments.  If you have not seen your ophthalmologist this year call for appointment.  Check your feet daily for redness, sores, or openings. Do not self treat. If no improvement in two days call your primary care physician for an appointment.  Low blood sugar (hypoglycemia) is a blood sugar below 70mg/dl. Check your blood sugar if you feel signs/symptoms of hypoglycemia. If your blood sugar is below 70 take 15 grams of carbohydrates (ex 4 oz of apple juice, 3-4 glucose tablets, or 4-6 oz of regular soda) wait 15 minutes and repeat blood sugar to make sure it comes up above 70.  If your blood sugar is above 70 and you are due for a meal, have a meal.  If you are not due for a meal have a snack.  This snack helps keeps your blood sugar at a safe range. You will take Ceftin 500mg oral twice a day x 7 days, and then discontinue - take until course is complete.  Pneumonia is a lung infection that can cause a fever, cough, and trouble breathing.  Continue all antibiotics as ordered until complete.  Nutrition is important, eat small frequent meals.  Get lots of rest and drink fluids.  Call your health care provider upon arrival home from hospital and make a follow up appointment for one week.  If your cough worsens, you develop fever greater than 101', you have shaking chills, a fast heartbeat, trouble breathing and/or feel your are breathing much faster than usual, call your healthcare provider.  Make sure you wash your hands frequently. You will need to follow up with your primary medical doctor/endocrinologist within one week of discharge - please call to make an appointment.  Continue with your current diabetes medication regimen.  HgA1C this admission - 7.9  Make sure you get your HgA1c checked every three months.  If you take oral diabetes medications, check your blood glucose two times a day.  If you take insulin, check your blood glucose before meals and at bedtime.  It's important not to skip any meals.  Keep a log of your blood glucose results and always take it with you to your doctor appointments.  Keep a list of your current medications including injectables and over the counter medications and bring this medication list with you to all your doctor appointments.  If you have not seen your ophthalmologist this year call for appointment.  Check your feet daily for redness, sores, or openings. Do not self treat. If no improvement in two days call your primary care physician for an appointment.  Low blood sugar (hypoglycemia) is a blood sugar below 70mg/dl. Check your blood sugar if you feel signs/symptoms of hypoglycemia. If your blood sugar is below 70 take 15 grams of carbohydrates (ex 4 oz of apple juice, 3-4 glucose tablets, or 4-6 oz of regular soda) wait 15 minutes and repeat blood sugar to make sure it comes up above 70.  If your blood sugar is above 70 and you are due for a meal, have a meal.  If you are not due for a meal have a snack.  This snack helps keeps your blood sugar at a safe range.

## 2017-02-06 NOTE — DISCHARGE NOTE ADULT - HOSPITAL COURSE
***To Be Completed By Attending*** 66 yr F with PMH of COPD, asthma and tracheomalacia s/p intubation and cardiac arrest in the past , Afib on pradaxa, Adrenal Insuffiiency,  DM2,  who presents to the hospital with 2 week history of shortness of breath.  Patient was recently in ed with PNA treated with meropenem and discharged home.  She had returned to her normal state of health and had been going to outpatient PT.  Patient reportedly began having increased shortness of breath and productive cough with yellow sputum.  patient noted a fever today to 101, so she came to the hospital for further care.  No chest pain, headache, lightheadedness, dizziness, abdominal pain, nausea, vomiting, diarrhea, constipation or dysuria.	  Improved on antibiotics  pulmonary, id and immunology fu as outup

## 2017-02-06 NOTE — DISCHARGE NOTE ADULT - ADDITIONAL INSTRUCTIONS
Your steroids were increased during hospitalization - you will take Medrol 8mg oral daily x 5 days (through 2/15/17) and then return to your original dose of 4mg oral daily (starting on 2/16/17).  You will need to follow up with your pulmonologist, Dr. Allison, [phone], within one week of discharge - please call to make an appointment. You will take Ceftin 500mg oral twice a day x 7 days, and then discontinue - take until course is complete.    Your steroids were increased during hospitalization - you will take Medrol 8mg oral daily x 5 days (through 2/15/17) and then return to your original dose of 4mg oral daily (starting on 2/16/17).  You will need to follow up with your pulmonologist, Dr. Allison, [phone], within one week of discharge - please call to make an appointment.    You will need to follow up with your primary medical doctor/endocrinologist within one week of discharge - please call to make an appointment. You will take Ceftin 500mg oral twice a day x 7 days, and then discontinue - take until course is complete.    Your steroids were increased during hospitalization - you will take Medrol 8mg oral daily x 5 days (through 2/15/17) and then return to your original dose of 4mg oral daily (starting on 2/16/17).  You will need to follow up with your pulmonologist, Dr. Allison, [phone], within one week of discharge - please call to make an appointment.    You will need to follow up with Immunology, Dr. Holloway [phone], as discussed during hospitalization - please call to make an appointment.    You will need to follow up with your primary medical doctor/endocrinologist within one week of discharge - please call to make an appointment.

## 2017-02-06 NOTE — DISCHARGE NOTE ADULT - CARE PLAN
Principal Discharge DX:	Left lower lobe pneumonia  Goal:	symptoms improved  Instructions for follow-up, activity and diet:	You will take Ceftin 500mg oral twice a day x 7 days, and then discontinue - take until course is complete  Pneumonia is a lung infection that can cause a fever, cough, and trouble breathing.  Continue all antibiotics as ordered until complete.  Nutrition is important, eat small frequent meals.  Get lots of rest and drink fluids.  Call your health care provider upon arrival home from hospital and make a follow up appointment for one week.  If your cough worsens, you develop fever greater than 101', you have shaking chills, a fast heartbeat, trouble breathing and/or feel your are breathing much faster than usual, call your healthcare provider.  Make sure you wash your hands frequently.  Secondary Diagnosis:	Atrial fibrillation  Instructions for follow-up, activity and diet:	Atrial fibrillation is the most common heart rhythm problem.  The condition puts you at risk for has stroke and heart attack  It helps if you control your blood pressure, not drink more than 1-2 alcohol drinks per day, cut down on caffeine, getting treatment for over active thyroid gland, and get regular exercise  Call your doctor if you feel your heart racing or beating unusually, chest tightness or pain, lightheaded, faint, shortness of breath especially with exercise  It is important to take your heart medication as prescribed  You may be on anticoagulation which is very important to take as directed - you may need blood work to monitor drug levels  Secondary Diagnosis:	Asthma  Instructions for follow-up, activity and diet:	Your steroids were increased during hospitalization - you will take Medrol 8mg oral daily x 5 days (through 2/15/17) and then return to your original dose of 4mg oral daily (starting on 2/16/17).  You will need to follow up with your pulmonologist, Dr. Allison, [phone], within one week of discharge - please call to make an appointment.  Secondary Diagnosis:	Diabetes  Instructions for follow-up, activity and diet:	HgA1C this admission.  Make sure you get your HgA1c checked every three months.  If you take oral diabetes medications, check your blood glucose two times a day.  If you take insulin, check your blood glucose before meals and at bedtime.  It's important not to skip any meals.  Keep a log of your blood glucose results and always take it with you to your doctor appointments.  Keep a list of your current medications including injectables and over the counter medications and bring this medication list with you to all your doctor appointments.  If you have not seen your ophthalmologist this year call for appointment.  Check your feet daily for redness, sores, or openings. Do not self treat. If no improvement in two days call your primary care physician for an appointment.  Low blood sugar (hypoglycemia) is a blood sugar below 70mg/dl. Check your blood sugar if you feel signs/symptoms of hypoglycemia. If your blood sugar is below 70 take 15 grams of carbohydrates (ex 4 oz of apple juice, 3-4 glucose tablets, or 4-6 oz of regular soda) wait 15 minutes and repeat blood sugar to make sure it comes up above 70.  If your blood sugar is above 70 and you are due for a meal, have a meal.  If you are not due for a meal have a snack.  This snack helps keeps your blood sugar at a safe range. Principal Discharge DX:	Left lower lobe pneumonia  Goal:	symptoms improved  Instructions for follow-up, activity and diet:	You will take Ceftin 500mg oral twice a day x 7 days, and then discontinue - take until course is complete  Pneumonia is a lung infection that can cause a fever, cough, and trouble breathing.  Continue all antibiotics as ordered until complete.  Nutrition is important, eat small frequent meals.  Get lots of rest and drink fluids.  Call your health care provider upon arrival home from hospital and make a follow up appointment for one week.  If your cough worsens, you develop fever greater than 101', you have shaking chills, a fast heartbeat, trouble breathing and/or feel your are breathing much faster than usual, call your healthcare provider.  Make sure you wash your hands frequently.  Secondary Diagnosis:	Atrial fibrillation  Instructions for follow-up, activity and diet:	Atrial fibrillation is the most common heart rhythm problem.  The condition puts you at risk for has stroke and heart attack  It helps if you control your blood pressure, not drink more than 1-2 alcohol drinks per day, cut down on caffeine, getting treatment for over active thyroid gland, and get regular exercise  Call your doctor if you feel your heart racing or beating unusually, chest tightness or pain, lightheaded, faint, shortness of breath especially with exercise  It is important to take your heart medication as prescribed  You may be on anticoagulation which is very important to take as directed - you may need blood work to monitor drug levels  Secondary Diagnosis:	Asthma  Instructions for follow-up, activity and diet:	Your steroids were increased during hospitalization - you will take Medrol 8mg oral daily x 5 days (through 2/15/17) and then return to your original dose of 4mg oral daily (starting on 2/16/17).  You will need to follow up with your pulmonologist, Dr. Allison, [phone], within one week of discharge - please call to make an appointment.  Secondary Diagnosis:	Diabetes  Instructions for follow-up, activity and diet:	HgA1C this admission.  Make sure you get your HgA1c checked every three months.  If you take oral diabetes medications, check your blood glucose two times a day.  If you take insulin, check your blood glucose before meals and at bedtime.  It's important not to skip any meals.  Keep a log of your blood glucose results and always take it with you to your doctor appointments.  Keep a list of your current medications including injectables and over the counter medications and bring this medication list with you to all your doctor appointments.  If you have not seen your ophthalmologist this year call for appointment.  Check your feet daily for redness, sores, or openings. Do not self treat. If no improvement in two days call your primary care physician for an appointment.  Low blood sugar (hypoglycemia) is a blood sugar below 70mg/dl. Check your blood sugar if you feel signs/symptoms of hypoglycemia. If your blood sugar is below 70 take 15 grams of carbohydrates (ex 4 oz of apple juice, 3-4 glucose tablets, or 4-6 oz of regular soda) wait 15 minutes and repeat blood sugar to make sure it comes up above 70.  If your blood sugar is above 70 and you are due for a meal, have a meal.  If you are not due for a meal have a snack.  This snack helps keeps your blood sugar at a safe range. Principal Discharge DX:	Left lower lobe pneumonia  Goal:	symptoms improved  Instructions for follow-up, activity and diet:	You will take Ceftin 500mg oral twice a day x 7 days, and then discontinue - take until course is complete.  Pneumonia is a lung infection that can cause a fever, cough, and trouble breathing.  Continue all antibiotics as ordered until complete.  Nutrition is important, eat small frequent meals.  Get lots of rest and drink fluids.  Call your health care provider upon arrival home from hospital and make a follow up appointment for one week.  If your cough worsens, you develop fever greater than 101', you have shaking chills, a fast heartbeat, trouble breathing and/or feel your are breathing much faster than usual, call your healthcare provider.  Make sure you wash your hands frequently.  Secondary Diagnosis:	Atrial fibrillation  Instructions for follow-up, activity and diet:	Atrial fibrillation is the most common heart rhythm problem.  The condition puts you at risk for has stroke and heart attack  It helps if you control your blood pressure, not drink more than 1-2 alcohol drinks per day, cut down on caffeine, getting treatment for over active thyroid gland, and get regular exercise  Call your doctor if you feel your heart racing or beating unusually, chest tightness or pain, lightheaded, faint, shortness of breath especially with exercise  It is important to take your heart medication as prescribed  You may be on anticoagulation which is very important to take as directed - you may need blood work to monitor drug levels  Secondary Diagnosis:	Asthma  Instructions for follow-up, activity and diet:	Your steroids were increased during hospitalization - you will take Medrol 8mg oral daily x 5 days (through 2/15/17) and then return to your original dose of 4mg oral daily (starting on 2/16/17).  You will need to follow up with your pulmonologist, Dr. Allison, [phone], within one week of discharge - please call to make an appointment.  Secondary Diagnosis:	Diabetes  Instructions for follow-up, activity and diet:	You will need to follow up with your primary medical doctor/endocrinologist within one week of discharge - please call to make an appointment.  Continue with your current diabetes medication regimen.  HgA1C this admission - 7.9  Make sure you get your HgA1c checked every three months.  If you take oral diabetes medications, check your blood glucose two times a day.  If you take insulin, check your blood glucose before meals and at bedtime.  It's important not to skip any meals.  Keep a log of your blood glucose results and always take it with you to your doctor appointments.  Keep a list of your current medications including injectables and over the counter medications and bring this medication list with you to all your doctor appointments.  If you have not seen your ophthalmologist this year call for appointment.  Check your feet daily for redness, sores, or openings. Do not self treat. If no improvement in two days call your primary care physician for an appointment.  Low blood sugar (hypoglycemia) is a blood sugar below 70mg/dl. Check your blood sugar if you feel signs/symptoms of hypoglycemia. If your blood sugar is below 70 take 15 grams of carbohydrates (ex 4 oz of apple juice, 3-4 glucose tablets, or 4-6 oz of regular soda) wait 15 minutes and repeat blood sugar to make sure it comes up above 70.  If your blood sugar is above 70 and you are due for a meal, have a meal.  If you are not due for a meal have a snack.  This snack helps keeps your blood sugar at a safe range. Principal Discharge DX:	Left lower lobe pneumonia  Goal:	symptoms improved  Instructions for follow-up, activity and diet:	You will take Ceftin 500mg oral twice a day x 7 days, and then discontinue - take until course is complete.  Pneumonia is a lung infection that can cause a fever, cough, and trouble breathing.  Continue all antibiotics as ordered until complete.  Nutrition is important, eat small frequent meals.  Get lots of rest and drink fluids.  Call your health care provider upon arrival home from hospital and make a follow up appointment for one week.  If your cough worsens, you develop fever greater than 101', you have shaking chills, a fast heartbeat, trouble breathing and/or feel your are breathing much faster than usual, call your healthcare provider.  Make sure you wash your hands frequently.  Secondary Diagnosis:	Atrial fibrillation  Instructions for follow-up, activity and diet:	Atrial fibrillation is the most common heart rhythm problem.  The condition puts you at risk for has stroke and heart attack  It helps if you control your blood pressure, not drink more than 1-2 alcohol drinks per day, cut down on caffeine, getting treatment for over active thyroid gland, and get regular exercise  Call your doctor if you feel your heart racing or beating unusually, chest tightness or pain, lightheaded, faint, shortness of breath especially with exercise  It is important to take your heart medication as prescribed  You may be on anticoagulation which is very important to take as directed - you may need blood work to monitor drug levels  Secondary Diagnosis:	Asthma  Instructions for follow-up, activity and diet:	Your steroids were increased during hospitalization - you will take Medrol 8mg oral daily x 5 days (through 2/15/17) and then return to your original dose of 4mg oral daily (starting on 2/16/17).  You will need to follow up with your pulmonologist, Dr. Allison, [phone], within one week of discharge - please call to make an appointment.  Secondary Diagnosis:	Diabetes  Instructions for follow-up, activity and diet:	You will need to follow up with your primary medical doctor/endocrinologist within one week of discharge - please call to make an appointment.  Continue with your current diabetes medication regimen.  HgA1C this admission - 7.9  Make sure you get your HgA1c checked every three months.  If you take oral diabetes medications, check your blood glucose two times a day.  If you take insulin, check your blood glucose before meals and at bedtime.  It's important not to skip any meals.  Keep a log of your blood glucose results and always take it with you to your doctor appointments.  Keep a list of your current medications including injectables and over the counter medications and bring this medication list with you to all your doctor appointments.  If you have not seen your ophthalmologist this year call for appointment.  Check your feet daily for redness, sores, or openings. Do not self treat. If no improvement in two days call your primary care physician for an appointment.  Low blood sugar (hypoglycemia) is a blood sugar below 70mg/dl. Check your blood sugar if you feel signs/symptoms of hypoglycemia. If your blood sugar is below 70 take 15 grams of carbohydrates (ex 4 oz of apple juice, 3-4 glucose tablets, or 4-6 oz of regular soda) wait 15 minutes and repeat blood sugar to make sure it comes up above 70.  If your blood sugar is above 70 and you are due for a meal, have a meal.  If you are not due for a meal have a snack.  This snack helps keeps your blood sugar at a safe range. Principal Discharge DX:	Left lower lobe pneumonia  Goal:	symptoms improved  Instructions for follow-up, activity and diet:	You will take Ceftin 500mg oral twice a day x 7 days, and then discontinue - take until course is complete.  Pneumonia is a lung infection that can cause a fever, cough, and trouble breathing.  Continue all antibiotics as ordered until complete.  Nutrition is important, eat small frequent meals.  Get lots of rest and drink fluids.  Call your health care provider upon arrival home from hospital and make a follow up appointment for one week.  If your cough worsens, you develop fever greater than 101', you have shaking chills, a fast heartbeat, trouble breathing and/or feel your are breathing much faster than usual, call your healthcare provider.  Make sure you wash your hands frequently.  Secondary Diagnosis:	Atrial fibrillation  Instructions for follow-up, activity and diet:	Atrial fibrillation is the most common heart rhythm problem.  The condition puts you at risk for has stroke and heart attack  It helps if you control your blood pressure, not drink more than 1-2 alcohol drinks per day, cut down on caffeine, getting treatment for over active thyroid gland, and get regular exercise  Call your doctor if you feel your heart racing or beating unusually, chest tightness or pain, lightheaded, faint, shortness of breath especially with exercise  It is important to take your heart medication as prescribed  You may be on anticoagulation which is very important to take as directed - you may need blood work to monitor drug levels  Secondary Diagnosis:	Asthma  Instructions for follow-up, activity and diet:	Your steroids were increased during hospitalization - you will take Medrol 8mg oral daily x 5 days (through 2/15/17) and then return to your original dose of 4mg oral daily (starting on 2/16/17).  You will need to follow up with your pulmonologist, Dr. Allison, [phone], within one week of discharge - please call to make an appointment.  Secondary Diagnosis:	Diabetes  Instructions for follow-up, activity and diet:	You will need to follow up with your primary medical doctor/endocrinologist within one week of discharge - please call to make an appointment.  Continue with your current diabetes medication regimen.  HgA1C this admission - 7.9  Make sure you get your HgA1c checked every three months.  If you take oral diabetes medications, check your blood glucose two times a day.  If you take insulin, check your blood glucose before meals and at bedtime.  It's important not to skip any meals.  Keep a log of your blood glucose results and always take it with you to your doctor appointments.  Keep a list of your current medications including injectables and over the counter medications and bring this medication list with you to all your doctor appointments.  If you have not seen your ophthalmologist this year call for appointment.  Check your feet daily for redness, sores, or openings. Do not self treat. If no improvement in two days call your primary care physician for an appointment.  Low blood sugar (hypoglycemia) is a blood sugar below 70mg/dl. Check your blood sugar if you feel signs/symptoms of hypoglycemia. If your blood sugar is below 70 take 15 grams of carbohydrates (ex 4 oz of apple juice, 3-4 glucose tablets, or 4-6 oz of regular soda) wait 15 minutes and repeat blood sugar to make sure it comes up above 70.  If your blood sugar is above 70 and you are due for a meal, have a meal.  If you are not due for a meal have a snack.  This snack helps keeps your blood sugar at a safe range. Principal Discharge DX:	Left lower lobe pneumonia  Goal:	symptoms improved  Instructions for follow-up, activity and diet:	You will take Ceftin 500mg oral twice a day x 7 days, and then discontinue - take until course is complete.  Pneumonia is a lung infection that can cause a fever, cough, and trouble breathing.  Continue all antibiotics as ordered until complete.  Nutrition is important, eat small frequent meals.  Get lots of rest and drink fluids.  Call your health care provider upon arrival home from hospital and make a follow up appointment for one week.  If your cough worsens, you develop fever greater than 101', you have shaking chills, a fast heartbeat, trouble breathing and/or feel your are breathing much faster than usual, call your healthcare provider.  Make sure you wash your hands frequently.  Secondary Diagnosis:	Atrial fibrillation  Instructions for follow-up, activity and diet:	Atrial fibrillation is the most common heart rhythm problem.  The condition puts you at risk for has stroke and heart attack  It helps if you control your blood pressure, not drink more than 1-2 alcohol drinks per day, cut down on caffeine, getting treatment for over active thyroid gland, and get regular exercise  Call your doctor if you feel your heart racing or beating unusually, chest tightness or pain, lightheaded, faint, shortness of breath especially with exercise  It is important to take your heart medication as prescribed  You may be on anticoagulation which is very important to take as directed - you may need blood work to monitor drug levels  Secondary Diagnosis:	Asthma  Instructions for follow-up, activity and diet:	Your steroids were increased during hospitalization - you will take Medrol 8mg oral daily x 5 days (through 2/15/17) and then return to your original dose of 4mg oral daily (starting on 2/16/17).  You will need to follow up with your pulmonologist, Dr. Allison, [phone], within one week of discharge - please call to make an appointment.  Secondary Diagnosis:	Diabetes  Instructions for follow-up, activity and diet:	You will need to follow up with your primary medical doctor/endocrinologist within one week of discharge - please call to make an appointment.  Continue with your current diabetes medication regimen.  HgA1C this admission - 7.9  Make sure you get your HgA1c checked every three months.  If you take oral diabetes medications, check your blood glucose two times a day.  If you take insulin, check your blood glucose before meals and at bedtime.  It's important not to skip any meals.  Keep a log of your blood glucose results and always take it with you to your doctor appointments.  Keep a list of your current medications including injectables and over the counter medications and bring this medication list with you to all your doctor appointments.  If you have not seen your ophthalmologist this year call for appointment.  Check your feet daily for redness, sores, or openings. Do not self treat. If no improvement in two days call your primary care physician for an appointment.  Low blood sugar (hypoglycemia) is a blood sugar below 70mg/dl. Check your blood sugar if you feel signs/symptoms of hypoglycemia. If your blood sugar is below 70 take 15 grams of carbohydrates (ex 4 oz of apple juice, 3-4 glucose tablets, or 4-6 oz of regular soda) wait 15 minutes and repeat blood sugar to make sure it comes up above 70.  If your blood sugar is above 70 and you are due for a meal, have a meal.  If you are not due for a meal have a snack.  This snack helps keeps your blood sugar at a safe range. Principal Discharge DX:	Left lower lobe pneumonia  Goal:	symptoms improved  Instructions for follow-up, activity and diet:	You will take Ceftin 500mg oral twice a day x 7 days, and then discontinue - take until course is complete.  Pneumonia is a lung infection that can cause a fever, cough, and trouble breathing.  Continue all antibiotics as ordered until complete.  Nutrition is important, eat small frequent meals.  Get lots of rest and drink fluids.  Call your health care provider upon arrival home from hospital and make a follow up appointment for one week.  If your cough worsens, you develop fever greater than 101', you have shaking chills, a fast heartbeat, trouble breathing and/or feel your are breathing much faster than usual, call your healthcare provider.  Make sure you wash your hands frequently.  Secondary Diagnosis:	Atrial fibrillation  Instructions for follow-up, activity and diet:	Atrial fibrillation is the most common heart rhythm problem.  The condition puts you at risk for has stroke and heart attack  It helps if you control your blood pressure, not drink more than 1-2 alcohol drinks per day, cut down on caffeine, getting treatment for over active thyroid gland, and get regular exercise  Call your doctor if you feel your heart racing or beating unusually, chest tightness or pain, lightheaded, faint, shortness of breath especially with exercise  It is important to take your heart medication as prescribed  You may be on anticoagulation which is very important to take as directed - you may need blood work to monitor drug levels  Secondary Diagnosis:	Asthma  Instructions for follow-up, activity and diet:	Your steroids were increased during hospitalization - you will take Medrol 8mg oral daily x 5 days (through 2/15/17) and then return to your original dose of 4mg oral daily (starting on 2/16/17).  You will need to follow up with your pulmonologist, Dr. Allison, [phone], within one week of discharge - please call to make an appointment.  Secondary Diagnosis:	Diabetes  Instructions for follow-up, activity and diet:	You will need to follow up with your primary medical doctor/endocrinologist within one week of discharge - please call to make an appointment.  Continue with your current diabetes medication regimen.  HgA1C this admission - 7.9  Make sure you get your HgA1c checked every three months.  If you take oral diabetes medications, check your blood glucose two times a day.  If you take insulin, check your blood glucose before meals and at bedtime.  It's important not to skip any meals.  Keep a log of your blood glucose results and always take it with you to your doctor appointments.  Keep a list of your current medications including injectables and over the counter medications and bring this medication list with you to all your doctor appointments.  If you have not seen your ophthalmologist this year call for appointment.  Check your feet daily for redness, sores, or openings. Do not self treat. If no improvement in two days call your primary care physician for an appointment.  Low blood sugar (hypoglycemia) is a blood sugar below 70mg/dl. Check your blood sugar if you feel signs/symptoms of hypoglycemia. If your blood sugar is below 70 take 15 grams of carbohydrates (ex 4 oz of apple juice, 3-4 glucose tablets, or 4-6 oz of regular soda) wait 15 minutes and repeat blood sugar to make sure it comes up above 70.  If your blood sugar is above 70 and you are due for a meal, have a meal.  If you are not due for a meal have a snack.  This snack helps keeps your blood sugar at a safe range.

## 2017-02-06 NOTE — DISCHARGE NOTE ADULT - MEDICATION SUMMARY - MEDICATIONS TO STOP TAKING
I will STOP taking the medications listed below when I get home from the hospital:    meropenem 1000 mg intravenous injection  -- 1000 milligram(s) intravenous every 8 hours  End date of Antibiotics 12/26/2016

## 2017-02-06 NOTE — DISCHARGE NOTE ADULT - PATIENT PORTAL LINK FT
“You can access the FollowHealth Patient Portal, offered by [facility], by registering with the following website: [email or web address]”

## 2017-02-07 LAB
4/8 RATIO: 2.12 RATIO — SIGNIFICANT CHANGE UP (ref 0.9–3.6)
ABS CD8: 419 /UL — SIGNIFICANT CHANGE UP (ref 136–757)
ANION GAP SERPL CALC-SCNC: 10 MMOL/L — SIGNIFICANT CHANGE UP (ref 5–17)
BUN SERPL-MCNC: 19 MG/DL — SIGNIFICANT CHANGE UP (ref 7–23)
CALCIUM SERPL-MCNC: 8.5 MG/DL — SIGNIFICANT CHANGE UP (ref 8.4–10.5)
CD16+CD56+ CELLS NFR BLD: 12 % — SIGNIFICANT CHANGE UP (ref 4–25)
CD16+CD56+ CELLS NFR SPEC: 236 /UL — SIGNIFICANT CHANGE UP (ref 64–494)
CD19 BLASTS SPEC-ACNC: 17 % — SIGNIFICANT CHANGE UP (ref 5–22)
CD19 BLASTS SPEC-ACNC: 333 /UL — SIGNIFICANT CHANGE UP (ref 68–528)
CD3 BLASTS SPEC-ACNC: 1324 /UL — SIGNIFICANT CHANGE UP (ref 799–2171)
CD3 BLASTS SPEC-ACNC: 70 % — SIGNIFICANT CHANGE UP (ref 59–85)
CD4 %: 47 % — SIGNIFICANT CHANGE UP (ref 36–65)
CD8 %: 22 % — SIGNIFICANT CHANGE UP (ref 11–36)
CHLORIDE SERPL-SCNC: 99 MMOL/L — SIGNIFICANT CHANGE UP (ref 96–108)
CO2 SERPL-SCNC: 30 MMOL/L — SIGNIFICANT CHANGE UP (ref 22–31)
CREAT SERPL-MCNC: 0.37 MG/DL — LOW (ref 0.5–1.3)
GLUCOSE SERPL-MCNC: 295 MG/DL — HIGH (ref 70–99)
HCT VFR BLD CALC: 31.7 % — LOW (ref 34.5–45)
HGB BLD-MCNC: 9.2 G/DL — LOW (ref 11.5–15.5)
IGE SERPL-ACNC: 194 IU/ML — HIGH (ref 0–100)
MCHC RBC-ENTMCNC: 19.8 PG — LOW (ref 27–34)
MCHC RBC-ENTMCNC: 29 GM/DL — LOW (ref 32–36)
MCV RBC AUTO: 68.3 FL — LOW (ref 80–100)
MEV IGG SER-ACNC: >300 AU/ML — SIGNIFICANT CHANGE UP
MEV IGG+IGM SER-IMP: POSITIVE — SIGNIFICANT CHANGE UP
MUV AB SER-ACNC: NEGATIVE — SIGNIFICANT CHANGE UP
MUV IGG FLD-ACNC: <5 AU/ML — SIGNIFICANT CHANGE UP
PLATELET # BLD AUTO: 454 K/UL — HIGH (ref 150–400)
POTASSIUM SERPL-MCNC: 4.6 MMOL/L — SIGNIFICANT CHANGE UP (ref 3.5–5.3)
POTASSIUM SERPL-SCNC: 4.6 MMOL/L — SIGNIFICANT CHANGE UP (ref 3.5–5.3)
RBC # BLD: 4.64 M/UL — SIGNIFICANT CHANGE UP (ref 3.8–5.2)
RBC # FLD: 17.2 % — HIGH (ref 10.3–14.5)
RUBV IGG SER-ACNC: 28.1 INDEX — SIGNIFICANT CHANGE UP
RUBV IGG SER-IMP: POSITIVE — SIGNIFICANT CHANGE UP
SODIUM SERPL-SCNC: 139 MMOL/L — SIGNIFICANT CHANGE UP (ref 135–145)
T-CELL CD4 SUBSET PNL BLD: 886 /UL — SIGNIFICANT CHANGE UP (ref 489–1457)
WBC # BLD: 12.13 K/UL — HIGH (ref 3.8–10.5)
WBC # FLD AUTO: 12.13 K/UL — HIGH (ref 3.8–10.5)

## 2017-02-07 PROCEDURE — 88319 ENZYME HISTOCHEMISTRY: CPT | Mod: 26

## 2017-02-07 PROCEDURE — 99233 SBSQ HOSP IP/OBS HIGH 50: CPT

## 2017-02-07 RX ORDER — INSULIN GLARGINE 100 [IU]/ML
20 INJECTION, SOLUTION SUBCUTANEOUS AT BEDTIME
Qty: 0 | Refills: 0 | Status: DISCONTINUED | OUTPATIENT
Start: 2017-02-07 | End: 2017-02-09

## 2017-02-07 RX ORDER — INSULIN LISPRO 100/ML
8 VIAL (ML) SUBCUTANEOUS
Qty: 0 | Refills: 0 | Status: DISCONTINUED | OUTPATIENT
Start: 2017-02-07 | End: 2017-02-09

## 2017-02-07 RX ADMIN — Medication: at 12:19

## 2017-02-07 RX ADMIN — MEROPENEM 200 MILLIGRAM(S): 1 INJECTION INTRAVENOUS at 13:30

## 2017-02-07 RX ADMIN — LORATADINE 10 MILLIGRAM(S): 10 TABLET ORAL at 12:19

## 2017-02-07 RX ADMIN — Medication 3 MILLILITER(S): at 20:07

## 2017-02-07 RX ADMIN — Medication 16 MILLIGRAM(S): at 05:50

## 2017-02-07 RX ADMIN — Medication 3 MILLILITER(S): at 01:01

## 2017-02-07 RX ADMIN — FLUTICASONE PROPIONATE AND SALMETEROL 1 DOSE(S): 50; 250 POWDER ORAL; RESPIRATORY (INHALATION) at 21:49

## 2017-02-07 RX ADMIN — APIXABAN 5 MILLIGRAM(S): 2.5 TABLET, FILM COATED ORAL at 17:41

## 2017-02-07 RX ADMIN — Medication 3: at 17:41

## 2017-02-07 RX ADMIN — Medication 1: at 22:00

## 2017-02-07 RX ADMIN — FLUTICASONE PROPIONATE AND SALMETEROL 1 DOSE(S): 50; 250 POWDER ORAL; RESPIRATORY (INHALATION) at 05:50

## 2017-02-07 RX ADMIN — SODIUM CHLORIDE 4 MILLILITER(S): 9 INJECTION INTRAMUSCULAR; INTRAVENOUS; SUBCUTANEOUS at 09:00

## 2017-02-07 RX ADMIN — SODIUM CHLORIDE 4 MILLILITER(S): 9 INJECTION INTRAMUSCULAR; INTRAVENOUS; SUBCUTANEOUS at 21:45

## 2017-02-07 RX ADMIN — Medication 150 MILLIGRAM(S): at 05:50

## 2017-02-07 RX ADMIN — Medication 5 UNIT(S): at 08:15

## 2017-02-07 RX ADMIN — INSULIN GLARGINE 20 UNIT(S): 100 INJECTION, SOLUTION SUBCUTANEOUS at 21:59

## 2017-02-07 RX ADMIN — PANTOPRAZOLE SODIUM 40 MILLIGRAM(S): 20 TABLET, DELAYED RELEASE ORAL at 05:50

## 2017-02-07 RX ADMIN — MEROPENEM 200 MILLIGRAM(S): 1 INJECTION INTRAVENOUS at 21:49

## 2017-02-07 RX ADMIN — Medication 3 MILLILITER(S): at 09:00

## 2017-02-07 RX ADMIN — Medication 1 SUPPOSITORY(S): at 21:49

## 2017-02-07 RX ADMIN — MONTELUKAST 10 MILLIGRAM(S): 4 TABLET, CHEWABLE ORAL at 12:19

## 2017-02-07 RX ADMIN — Medication 100 MILLIGRAM(S): at 17:41

## 2017-02-07 RX ADMIN — Medication 0.25 MILLIGRAM(S): at 05:50

## 2017-02-07 RX ADMIN — POLYETHYLENE GLYCOL 3350 17 GRAM(S): 17 POWDER, FOR SOLUTION ORAL at 21:50

## 2017-02-07 RX ADMIN — Medication 3: at 08:15

## 2017-02-07 RX ADMIN — APIXABAN 5 MILLIGRAM(S): 2.5 TABLET, FILM COATED ORAL at 05:50

## 2017-02-07 RX ADMIN — MEROPENEM 200 MILLIGRAM(S): 1 INJECTION INTRAVENOUS at 05:49

## 2017-02-07 RX ADMIN — Medication 8 UNIT(S): at 17:41

## 2017-02-07 RX ADMIN — Medication 100 MILLIGRAM(S): at 05:49

## 2017-02-07 RX ADMIN — Medication 150 MILLIGRAM(S): at 17:41

## 2017-02-07 RX ADMIN — Medication 3 MILLILITER(S): at 13:33

## 2017-02-07 RX ADMIN — Medication 5 UNIT(S): at 12:19

## 2017-02-08 LAB
ANION GAP SERPL CALC-SCNC: 10 MMOL/L — SIGNIFICANT CHANGE UP (ref 5–17)
BUN SERPL-MCNC: 18 MG/DL — SIGNIFICANT CHANGE UP (ref 7–23)
CALCIUM SERPL-MCNC: 8.7 MG/DL — SIGNIFICANT CHANGE UP (ref 8.4–10.5)
CHLORIDE SERPL-SCNC: 99 MMOL/L — SIGNIFICANT CHANGE UP (ref 96–108)
CO2 SERPL-SCNC: 28 MMOL/L — SIGNIFICANT CHANGE UP (ref 22–31)
CREAT SERPL-MCNC: 0.61 MG/DL — SIGNIFICANT CHANGE UP (ref 0.5–1.3)
CULTURE RESULTS: SIGNIFICANT CHANGE UP
CULTURE RESULTS: SIGNIFICANT CHANGE UP
GLUCOSE SERPL-MCNC: 247 MG/DL — HIGH (ref 70–99)
HCT VFR BLD CALC: 31.7 % — LOW (ref 34.5–45)
HGB BLD-MCNC: 9.5 G/DL — LOW (ref 11.5–15.5)
MCHC RBC-ENTMCNC: 20.6 PG — LOW (ref 27–34)
MCHC RBC-ENTMCNC: 30 GM/DL — LOW (ref 32–36)
MCV RBC AUTO: 68.6 FL — LOW (ref 80–100)
PLATELET # BLD AUTO: 470 K/UL — HIGH (ref 150–400)
POTASSIUM SERPL-MCNC: 4.2 MMOL/L — SIGNIFICANT CHANGE UP (ref 3.5–5.3)
POTASSIUM SERPL-SCNC: 4.2 MMOL/L — SIGNIFICANT CHANGE UP (ref 3.5–5.3)
RBC # BLD: 4.62 M/UL — SIGNIFICANT CHANGE UP (ref 3.8–5.2)
RBC # FLD: 17.3 % — HIGH (ref 10.3–14.5)
SODIUM SERPL-SCNC: 137 MMOL/L — SIGNIFICANT CHANGE UP (ref 135–145)
SPECIMEN SOURCE: SIGNIFICANT CHANGE UP
SPECIMEN SOURCE: SIGNIFICANT CHANGE UP
TOTAL HEM COMP BLD-ACNC: >63 U/ML — HIGH (ref 42–60)
WBC # BLD: 11.42 K/UL — HIGH (ref 3.8–10.5)
WBC # FLD AUTO: 11.42 K/UL — HIGH (ref 3.8–10.5)

## 2017-02-08 PROCEDURE — 99233 SBSQ HOSP IP/OBS HIGH 50: CPT

## 2017-02-08 PROCEDURE — 70450 CT HEAD/BRAIN W/O DYE: CPT | Mod: 26

## 2017-02-08 RX ADMIN — Medication 2: at 08:18

## 2017-02-08 RX ADMIN — Medication 3 MILLILITER(S): at 15:37

## 2017-02-08 RX ADMIN — MONTELUKAST 10 MILLIGRAM(S): 4 TABLET, CHEWABLE ORAL at 12:18

## 2017-02-08 RX ADMIN — MEROPENEM 200 MILLIGRAM(S): 1 INJECTION INTRAVENOUS at 15:37

## 2017-02-08 RX ADMIN — APIXABAN 5 MILLIGRAM(S): 2.5 TABLET, FILM COATED ORAL at 17:26

## 2017-02-08 RX ADMIN — Medication 2: at 21:50

## 2017-02-08 RX ADMIN — Medication 150 MILLIGRAM(S): at 06:10

## 2017-02-08 RX ADMIN — SODIUM CHLORIDE 4 MILLILITER(S): 9 INJECTION INTRAMUSCULAR; INTRAVENOUS; SUBCUTANEOUS at 20:13

## 2017-02-08 RX ADMIN — MEROPENEM 200 MILLIGRAM(S): 1 INJECTION INTRAVENOUS at 06:02

## 2017-02-08 RX ADMIN — ONDANSETRON 4 MILLIGRAM(S): 8 TABLET, FILM COATED ORAL at 09:33

## 2017-02-08 RX ADMIN — Medication 100 MILLIGRAM(S): at 17:26

## 2017-02-08 RX ADMIN — APIXABAN 5 MILLIGRAM(S): 2.5 TABLET, FILM COATED ORAL at 06:02

## 2017-02-08 RX ADMIN — INSULIN GLARGINE 20 UNIT(S): 100 INJECTION, SOLUTION SUBCUTANEOUS at 21:49

## 2017-02-08 RX ADMIN — POLYETHYLENE GLYCOL 3350 17 GRAM(S): 17 POWDER, FOR SOLUTION ORAL at 12:28

## 2017-02-08 RX ADMIN — Medication 3 MILLILITER(S): at 01:21

## 2017-02-08 RX ADMIN — SODIUM CHLORIDE 4 MILLILITER(S): 9 INJECTION INTRAMUSCULAR; INTRAVENOUS; SUBCUTANEOUS at 08:17

## 2017-02-08 RX ADMIN — Medication 3 MILLILITER(S): at 08:18

## 2017-02-08 RX ADMIN — Medication 150 MILLIGRAM(S): at 17:28

## 2017-02-08 RX ADMIN — Medication 16 MILLIGRAM(S): at 06:02

## 2017-02-08 RX ADMIN — MEROPENEM 200 MILLIGRAM(S): 1 INJECTION INTRAVENOUS at 21:49

## 2017-02-08 RX ADMIN — Medication 3 MILLILITER(S): at 20:13

## 2017-02-08 RX ADMIN — FLUTICASONE PROPIONATE AND SALMETEROL 1 DOSE(S): 50; 250 POWDER ORAL; RESPIRATORY (INHALATION) at 06:02

## 2017-02-08 RX ADMIN — Medication 3: at 12:19

## 2017-02-08 RX ADMIN — PANTOPRAZOLE SODIUM 40 MILLIGRAM(S): 20 TABLET, DELAYED RELEASE ORAL at 06:02

## 2017-02-08 RX ADMIN — Medication 0.25 MILLIGRAM(S): at 06:02

## 2017-02-08 RX ADMIN — Medication 8 UNIT(S): at 17:26

## 2017-02-08 RX ADMIN — Medication 100 MILLIGRAM(S): at 06:02

## 2017-02-08 RX ADMIN — Medication 1 SUPPOSITORY(S): at 21:48

## 2017-02-08 RX ADMIN — LORATADINE 10 MILLIGRAM(S): 10 TABLET ORAL at 12:18

## 2017-02-08 RX ADMIN — FLUTICASONE PROPIONATE AND SALMETEROL 1 DOSE(S): 50; 250 POWDER ORAL; RESPIRATORY (INHALATION) at 21:49

## 2017-02-08 RX ADMIN — Medication 2: at 17:26

## 2017-02-08 RX ADMIN — Medication 8 UNIT(S): at 08:18

## 2017-02-08 RX ADMIN — Medication 8 UNIT(S): at 12:18

## 2017-02-09 LAB
ANION GAP SERPL CALC-SCNC: 10 MMOL/L — SIGNIFICANT CHANGE UP (ref 5–17)
BUN SERPL-MCNC: 16 MG/DL — SIGNIFICANT CHANGE UP (ref 7–23)
C DIPHTHERIAE AB SER-ACNC: <0.1 IU/ML — LOW
CALCIUM SERPL-MCNC: 8.4 MG/DL — SIGNIFICANT CHANGE UP (ref 8.4–10.5)
CHLORIDE SERPL-SCNC: 100 MMOL/L — SIGNIFICANT CHANGE UP (ref 96–108)
CO2 SERPL-SCNC: 29 MMOL/L — SIGNIFICANT CHANGE UP (ref 22–31)
CREAT SERPL-MCNC: 0.74 MG/DL — SIGNIFICANT CHANGE UP (ref 0.5–1.3)
G6PD RBC-CCNC: 11.3 U/G HB — SIGNIFICANT CHANGE UP (ref 4.6–13.5)
GLUCOSE SERPL-MCNC: 248 MG/DL — HIGH (ref 70–99)
HCT VFR BLD CALC: 31.1 % — LOW (ref 34.5–45)
HGB BLD-MCNC: 9.5 G/DL — LOW (ref 11.5–15.5)
MCHC RBC-ENTMCNC: 20.4 PG — LOW (ref 27–34)
MCHC RBC-ENTMCNC: 30.5 GM/DL — LOW (ref 32–36)
MCV RBC AUTO: 66.9 FL — LOW (ref 80–100)
PLATELET # BLD AUTO: 501 K/UL — HIGH (ref 150–400)
POTASSIUM SERPL-MCNC: 4.7 MMOL/L — SIGNIFICANT CHANGE UP (ref 3.5–5.3)
POTASSIUM SERPL-SCNC: 4.7 MMOL/L — SIGNIFICANT CHANGE UP (ref 3.5–5.3)
RBC # BLD: 4.65 M/UL — SIGNIFICANT CHANGE UP (ref 3.8–5.2)
RBC # FLD: 17.3 % — HIGH (ref 10.3–14.5)
SODIUM SERPL-SCNC: 139 MMOL/L — SIGNIFICANT CHANGE UP (ref 135–145)
TETANUS ANTIBODIES IGG: 0.24 IU/ML — SIGNIFICANT CHANGE UP
WBC # BLD: 11.19 K/UL — HIGH (ref 3.8–10.5)
WBC # FLD AUTO: 11.19 K/UL — HIGH (ref 3.8–10.5)

## 2017-02-09 PROCEDURE — 99233 SBSQ HOSP IP/OBS HIGH 50: CPT

## 2017-02-09 RX ORDER — INSULIN LISPRO 100/ML
11 VIAL (ML) SUBCUTANEOUS
Qty: 0 | Refills: 0 | Status: DISCONTINUED | OUTPATIENT
Start: 2017-02-09 | End: 2017-02-11

## 2017-02-09 RX ORDER — INSULIN GLARGINE 100 [IU]/ML
30 INJECTION, SOLUTION SUBCUTANEOUS AT BEDTIME
Qty: 0 | Refills: 0 | Status: DISCONTINUED | OUTPATIENT
Start: 2017-02-09 | End: 2017-02-11

## 2017-02-09 RX ADMIN — Medication 150 MILLIGRAM(S): at 06:20

## 2017-02-09 RX ADMIN — Medication 150 MILLIGRAM(S): at 17:36

## 2017-02-09 RX ADMIN — MEROPENEM 200 MILLIGRAM(S): 1 INJECTION INTRAVENOUS at 22:13

## 2017-02-09 RX ADMIN — Medication 3 MILLILITER(S): at 01:20

## 2017-02-09 RX ADMIN — SODIUM CHLORIDE 4 MILLILITER(S): 9 INJECTION INTRAMUSCULAR; INTRAVENOUS; SUBCUTANEOUS at 19:52

## 2017-02-09 RX ADMIN — FLUTICASONE PROPIONATE AND SALMETEROL 1 DOSE(S): 50; 250 POWDER ORAL; RESPIRATORY (INHALATION) at 06:20

## 2017-02-09 RX ADMIN — Medication 0.25 MILLIGRAM(S): at 06:19

## 2017-02-09 RX ADMIN — Medication 2: at 17:37

## 2017-02-09 RX ADMIN — FLUTICASONE PROPIONATE AND SALMETEROL 1 DOSE(S): 50; 250 POWDER ORAL; RESPIRATORY (INHALATION) at 22:14

## 2017-02-09 RX ADMIN — Medication 4: at 12:20

## 2017-02-09 RX ADMIN — SODIUM CHLORIDE 4 MILLILITER(S): 9 INJECTION INTRAMUSCULAR; INTRAVENOUS; SUBCUTANEOUS at 08:11

## 2017-02-09 RX ADMIN — Medication 16 MILLIGRAM(S): at 06:19

## 2017-02-09 RX ADMIN — APIXABAN 5 MILLIGRAM(S): 2.5 TABLET, FILM COATED ORAL at 06:19

## 2017-02-09 RX ADMIN — PANTOPRAZOLE SODIUM 40 MILLIGRAM(S): 20 TABLET, DELAYED RELEASE ORAL at 06:20

## 2017-02-09 RX ADMIN — Medication 100 MILLIGRAM(S): at 06:20

## 2017-02-09 RX ADMIN — Medication 11 UNIT(S): at 17:36

## 2017-02-09 RX ADMIN — Medication 3 MILLILITER(S): at 08:11

## 2017-02-09 RX ADMIN — APIXABAN 5 MILLIGRAM(S): 2.5 TABLET, FILM COATED ORAL at 17:36

## 2017-02-09 RX ADMIN — MEROPENEM 200 MILLIGRAM(S): 1 INJECTION INTRAVENOUS at 14:02

## 2017-02-09 RX ADMIN — Medication 11 UNIT(S): at 12:20

## 2017-02-09 RX ADMIN — Medication 3 MILLILITER(S): at 14:02

## 2017-02-09 RX ADMIN — MEROPENEM 200 MILLIGRAM(S): 1 INJECTION INTRAVENOUS at 06:20

## 2017-02-09 RX ADMIN — Medication 8 UNIT(S): at 08:11

## 2017-02-09 RX ADMIN — Medication 2: at 08:11

## 2017-02-09 RX ADMIN — LORATADINE 10 MILLIGRAM(S): 10 TABLET ORAL at 12:21

## 2017-02-09 RX ADMIN — Medication 1 SUPPOSITORY(S): at 22:15

## 2017-02-09 RX ADMIN — Medication 100 MILLIGRAM(S): at 17:36

## 2017-02-09 RX ADMIN — POLYETHYLENE GLYCOL 3350 17 GRAM(S): 17 POWDER, FOR SOLUTION ORAL at 22:19

## 2017-02-09 RX ADMIN — Medication 3 MILLILITER(S): at 19:52

## 2017-02-09 RX ADMIN — INSULIN GLARGINE 30 UNIT(S): 100 INJECTION, SOLUTION SUBCUTANEOUS at 22:13

## 2017-02-09 RX ADMIN — MONTELUKAST 10 MILLIGRAM(S): 4 TABLET, CHEWABLE ORAL at 12:21

## 2017-02-10 LAB
IGA FLD-MCNC: 625 MG/DL — HIGH (ref 68–378)
IGG FLD-MCNC: 819 MG/DL — SIGNIFICANT CHANGE UP (ref 694–1618)
IGM SERPL-MCNC: 220 MG/DL — SIGNIFICANT CHANGE UP (ref 40–230)
KAPPA LC SER QL IFE: 2.47 MG/DL — HIGH (ref 0.33–1.94)
KAPPA/LAMBDA FREE LIGHT CHAIN RATIO, SERUM: 1.58 RATIO — SIGNIFICANT CHANGE UP (ref 0.26–1.65)
LAMBDA LC SER QL IFE: 1.56 MG/DL — SIGNIFICANT CHANGE UP (ref 0.57–2.63)

## 2017-02-10 PROCEDURE — 99233 SBSQ HOSP IP/OBS HIGH 50: CPT

## 2017-02-10 RX ORDER — ALPRAZOLAM 0.25 MG
1 TABLET ORAL
Qty: 0 | Refills: 0 | COMMUNITY
Start: 2017-02-10

## 2017-02-10 RX ORDER — LORATADINE 10 MG/1
1 TABLET ORAL
Qty: 30 | Refills: 0 | OUTPATIENT
Start: 2017-02-10 | End: 2017-03-12

## 2017-02-10 RX ORDER — HYDROCORTISONE 1 %
1 OINTMENT (GRAM) TOPICAL
Qty: 0 | Refills: 0 | COMMUNITY
Start: 2017-02-10

## 2017-02-10 RX ORDER — APIXABAN 2.5 MG/1
1 TABLET, FILM COATED ORAL
Qty: 60 | Refills: 0 | OUTPATIENT
Start: 2017-02-10 | End: 2017-03-12

## 2017-02-10 RX ADMIN — Medication 11 UNIT(S): at 12:33

## 2017-02-10 RX ADMIN — FLUTICASONE PROPIONATE AND SALMETEROL 1 DOSE(S): 50; 250 POWDER ORAL; RESPIRATORY (INHALATION) at 22:31

## 2017-02-10 RX ADMIN — Medication 100 MILLIGRAM(S): at 05:47

## 2017-02-10 RX ADMIN — Medication 1 SUPPOSITORY(S): at 22:32

## 2017-02-10 RX ADMIN — Medication 3 MILLILITER(S): at 15:15

## 2017-02-10 RX ADMIN — Medication 3 MILLILITER(S): at 01:00

## 2017-02-10 RX ADMIN — APIXABAN 5 MILLIGRAM(S): 2.5 TABLET, FILM COATED ORAL at 05:47

## 2017-02-10 RX ADMIN — POLYETHYLENE GLYCOL 3350 17 GRAM(S): 17 POWDER, FOR SOLUTION ORAL at 12:35

## 2017-02-10 RX ADMIN — MEROPENEM 200 MILLIGRAM(S): 1 INJECTION INTRAVENOUS at 15:22

## 2017-02-10 RX ADMIN — Medication 2: at 08:19

## 2017-02-10 RX ADMIN — Medication 11 UNIT(S): at 08:20

## 2017-02-10 RX ADMIN — Medication 11 UNIT(S): at 17:49

## 2017-02-10 RX ADMIN — Medication 0.25 MILLIGRAM(S): at 05:47

## 2017-02-10 RX ADMIN — Medication 3 MILLILITER(S): at 08:21

## 2017-02-10 RX ADMIN — Medication 0.5 MILLIGRAM(S): at 22:30

## 2017-02-10 RX ADMIN — Medication 3 MILLILITER(S): at 21:29

## 2017-02-10 RX ADMIN — MONTELUKAST 10 MILLIGRAM(S): 4 TABLET, CHEWABLE ORAL at 12:35

## 2017-02-10 RX ADMIN — SODIUM CHLORIDE 4 MILLILITER(S): 9 INJECTION INTRAMUSCULAR; INTRAVENOUS; SUBCUTANEOUS at 08:21

## 2017-02-10 RX ADMIN — Medication 150 MILLIGRAM(S): at 05:46

## 2017-02-10 RX ADMIN — Medication 100 MILLIGRAM(S): at 17:52

## 2017-02-10 RX ADMIN — FLUTICASONE PROPIONATE AND SALMETEROL 1 DOSE(S): 50; 250 POWDER ORAL; RESPIRATORY (INHALATION) at 05:57

## 2017-02-10 RX ADMIN — MEROPENEM 200 MILLIGRAM(S): 1 INJECTION INTRAVENOUS at 22:33

## 2017-02-10 RX ADMIN — MEROPENEM 200 MILLIGRAM(S): 1 INJECTION INTRAVENOUS at 05:48

## 2017-02-10 RX ADMIN — INSULIN GLARGINE 30 UNIT(S): 100 INJECTION, SOLUTION SUBCUTANEOUS at 22:33

## 2017-02-10 RX ADMIN — Medication 16 MILLIGRAM(S): at 05:47

## 2017-02-10 RX ADMIN — SODIUM CHLORIDE 4 MILLILITER(S): 9 INJECTION INTRAMUSCULAR; INTRAVENOUS; SUBCUTANEOUS at 21:29

## 2017-02-10 RX ADMIN — APIXABAN 5 MILLIGRAM(S): 2.5 TABLET, FILM COATED ORAL at 17:51

## 2017-02-10 RX ADMIN — LORATADINE 10 MILLIGRAM(S): 10 TABLET ORAL at 12:35

## 2017-02-10 RX ADMIN — Medication 1: at 17:49

## 2017-02-10 RX ADMIN — PANTOPRAZOLE SODIUM 40 MILLIGRAM(S): 20 TABLET, DELAYED RELEASE ORAL at 05:47

## 2017-02-10 RX ADMIN — Medication 150 MILLIGRAM(S): at 17:54

## 2017-02-11 VITALS
RESPIRATION RATE: 18 BRPM | SYSTOLIC BLOOD PRESSURE: 124 MMHG | HEART RATE: 70 BPM | OXYGEN SATURATION: 96 % | DIASTOLIC BLOOD PRESSURE: 75 MMHG | TEMPERATURE: 98 F

## 2017-02-11 LAB
ANION GAP SERPL CALC-SCNC: 10 MMOL/L — SIGNIFICANT CHANGE UP (ref 5–17)
BUN SERPL-MCNC: 19 MG/DL — SIGNIFICANT CHANGE UP (ref 7–23)
CALCIUM SERPL-MCNC: 9.1 MG/DL — SIGNIFICANT CHANGE UP (ref 8.4–10.5)
CHLORIDE SERPL-SCNC: 102 MMOL/L — SIGNIFICANT CHANGE UP (ref 96–108)
CO2 SERPL-SCNC: 28 MMOL/L — SIGNIFICANT CHANGE UP (ref 22–31)
CREAT SERPL-MCNC: 0.61 MG/DL — SIGNIFICANT CHANGE UP (ref 0.5–1.3)
GLUCOSE SERPL-MCNC: 235 MG/DL — HIGH (ref 70–99)
HCT VFR BLD CALC: 29.9 % — LOW (ref 34.5–45)
HGB BLD-MCNC: 9 G/DL — LOW (ref 11.5–15.5)
HIV 1+2 AB+HIV1 P24 AG SERPL QL IA: SIGNIFICANT CHANGE UP
MCHC RBC-ENTMCNC: 20.3 PG — LOW (ref 27–34)
MCHC RBC-ENTMCNC: 30.1 GM/DL — LOW (ref 32–36)
MCV RBC AUTO: 67.3 FL — LOW (ref 80–100)
PLATELET # BLD AUTO: 462 K/UL — HIGH (ref 150–400)
POTASSIUM SERPL-MCNC: 4.4 MMOL/L — SIGNIFICANT CHANGE UP (ref 3.5–5.3)
POTASSIUM SERPL-SCNC: 4.4 MMOL/L — SIGNIFICANT CHANGE UP (ref 3.5–5.3)
RBC # BLD: 4.44 M/UL — SIGNIFICANT CHANGE UP (ref 3.8–5.2)
RBC # FLD: 17.1 % — HIGH (ref 10.3–14.5)
SODIUM SERPL-SCNC: 140 MMOL/L — SIGNIFICANT CHANGE UP (ref 135–145)
WBC # BLD: 11.25 K/UL — HIGH (ref 3.8–10.5)
WBC # FLD AUTO: 11.25 K/UL — HIGH (ref 3.8–10.5)

## 2017-02-11 PROCEDURE — 86162 COMPLEMENT TOTAL (CH50): CPT

## 2017-02-11 PROCEDURE — 86160 COMPLEMENT ANTIGEN: CPT

## 2017-02-11 PROCEDURE — 87633 RESP VIRUS 12-25 TARGETS: CPT

## 2017-02-11 PROCEDURE — 70450 CT HEAD/BRAIN W/O DYE: CPT

## 2017-02-11 PROCEDURE — 86352 CELL FUNCTION ASSAY W/STIM: CPT

## 2017-02-11 PROCEDURE — 82947 ASSAY GLUCOSE BLOOD QUANT: CPT

## 2017-02-11 PROCEDURE — 82785 ASSAY OF IGE: CPT

## 2017-02-11 PROCEDURE — 87486 CHLMYD PNEUM DNA AMP PROBE: CPT

## 2017-02-11 PROCEDURE — 83605 ASSAY OF LACTIC ACID: CPT

## 2017-02-11 PROCEDURE — 86684 HEMOPHILUS INFLUENZA ANTIBDY: CPT

## 2017-02-11 PROCEDURE — 83520 IMMUNOASSAY QUANT NOS NONAB: CPT

## 2017-02-11 PROCEDURE — 86648 DIPHTHERIA ANTIBODY: CPT

## 2017-02-11 PROCEDURE — 86003 ALLG SPEC IGE CRUDE XTRC EA: CPT

## 2017-02-11 PROCEDURE — 87798 DETECT AGENT NOS DNA AMP: CPT

## 2017-02-11 PROCEDURE — 96375 TX/PRO/DX INJ NEW DRUG ADDON: CPT

## 2017-02-11 PROCEDURE — 82435 ASSAY OF BLOOD CHLORIDE: CPT

## 2017-02-11 PROCEDURE — 83735 ASSAY OF MAGNESIUM: CPT

## 2017-02-11 PROCEDURE — 82784 ASSAY IGA/IGD/IGG/IGM EACH: CPT

## 2017-02-11 PROCEDURE — 86735 MUMPS ANTIBODY: CPT

## 2017-02-11 PROCEDURE — 82330 ASSAY OF CALCIUM: CPT

## 2017-02-11 PROCEDURE — 82955 ASSAY OF G6PD ENZYME: CPT

## 2017-02-11 PROCEDURE — 80048 BASIC METABOLIC PNL TOTAL CA: CPT

## 2017-02-11 PROCEDURE — 85014 HEMATOCRIT: CPT

## 2017-02-11 PROCEDURE — 86355 B CELLS TOTAL COUNT: CPT

## 2017-02-11 PROCEDURE — 87040 BLOOD CULTURE FOR BACTERIA: CPT

## 2017-02-11 PROCEDURE — 87389 HIV-1 AG W/HIV-1&-2 AB AG IA: CPT

## 2017-02-11 PROCEDURE — 83036 HEMOGLOBIN GLYCOSYLATED A1C: CPT

## 2017-02-11 PROCEDURE — 86357 NK CELLS TOTAL COUNT: CPT

## 2017-02-11 PROCEDURE — 85027 COMPLETE CBC AUTOMATED: CPT

## 2017-02-11 PROCEDURE — 87581 M.PNEUMON DNA AMP PROBE: CPT

## 2017-02-11 PROCEDURE — 86762 RUBELLA ANTIBODY: CPT

## 2017-02-11 PROCEDURE — 94640 AIRWAY INHALATION TREATMENT: CPT

## 2017-02-11 PROCEDURE — 82803 BLOOD GASES ANY COMBINATION: CPT

## 2017-02-11 PROCEDURE — 71250 CT THORAX DX C-: CPT

## 2017-02-11 PROCEDURE — 87449 NOS EACH ORGANISM AG IA: CPT

## 2017-02-11 PROCEDURE — 84132 ASSAY OF SERUM POTASSIUM: CPT

## 2017-02-11 PROCEDURE — 96374 THER/PROPH/DIAG INJ IV PUSH: CPT

## 2017-02-11 PROCEDURE — 80162 ASSAY OF DIGOXIN TOTAL: CPT

## 2017-02-11 PROCEDURE — 84100 ASSAY OF PHOSPHORUS: CPT

## 2017-02-11 PROCEDURE — 99285 EMERGENCY DEPT VISIT HI MDM: CPT | Mod: 25

## 2017-02-11 PROCEDURE — 85730 THROMBOPLASTIN TIME PARTIAL: CPT

## 2017-02-11 PROCEDURE — 71045 X-RAY EXAM CHEST 1 VIEW: CPT

## 2017-02-11 PROCEDURE — 86774 TETANUS ANTIBODY: CPT

## 2017-02-11 PROCEDURE — 86765 RUBEOLA ANTIBODY: CPT

## 2017-02-11 PROCEDURE — 87070 CULTURE OTHR SPECIMN AEROBIC: CPT

## 2017-02-11 PROCEDURE — 85610 PROTHROMBIN TIME: CPT

## 2017-02-11 PROCEDURE — 80053 COMPREHEN METABOLIC PANEL: CPT

## 2017-02-11 PROCEDURE — 84295 ASSAY OF SERUM SODIUM: CPT

## 2017-02-11 PROCEDURE — 86353 LYMPHOCYTE TRANSFORMATION: CPT

## 2017-02-11 PROCEDURE — 93005 ELECTROCARDIOGRAM TRACING: CPT

## 2017-02-11 PROCEDURE — 80202 ASSAY OF VANCOMYCIN: CPT

## 2017-02-11 RX ORDER — CEFOXITIN 1 G/1
1 INJECTION, POWDER, FOR SOLUTION INTRAVENOUS
Qty: 14 | Refills: 0 | OUTPATIENT
Start: 2017-02-11 | End: 2017-02-18

## 2017-02-11 RX ORDER — LIRAGLUTIDE 6 MG/ML
0 INJECTION SUBCUTANEOUS
Qty: 0 | Refills: 0 | COMMUNITY

## 2017-02-11 RX ADMIN — APIXABAN 5 MILLIGRAM(S): 2.5 TABLET, FILM COATED ORAL at 06:17

## 2017-02-11 RX ADMIN — PANTOPRAZOLE SODIUM 40 MILLIGRAM(S): 20 TABLET, DELAYED RELEASE ORAL at 06:17

## 2017-02-11 RX ADMIN — MEROPENEM 200 MILLIGRAM(S): 1 INJECTION INTRAVENOUS at 13:27

## 2017-02-11 RX ADMIN — Medication 8 MILLIGRAM(S): at 06:17

## 2017-02-11 RX ADMIN — Medication 11 UNIT(S): at 12:32

## 2017-02-11 RX ADMIN — Medication 0.25 MILLIGRAM(S): at 06:17

## 2017-02-11 RX ADMIN — Medication 100 MILLIGRAM(S): at 06:17

## 2017-02-11 RX ADMIN — MONTELUKAST 10 MILLIGRAM(S): 4 TABLET, CHEWABLE ORAL at 12:33

## 2017-02-11 RX ADMIN — Medication 3 MILLILITER(S): at 13:28

## 2017-02-11 RX ADMIN — FLUTICASONE PROPIONATE AND SALMETEROL 1 DOSE(S): 50; 250 POWDER ORAL; RESPIRATORY (INHALATION) at 06:18

## 2017-02-11 RX ADMIN — Medication 11 UNIT(S): at 08:25

## 2017-02-11 RX ADMIN — SODIUM CHLORIDE 4 MILLILITER(S): 9 INJECTION INTRAMUSCULAR; INTRAVENOUS; SUBCUTANEOUS at 08:35

## 2017-02-11 RX ADMIN — LORATADINE 10 MILLIGRAM(S): 10 TABLET ORAL at 12:33

## 2017-02-11 RX ADMIN — MEROPENEM 200 MILLIGRAM(S): 1 INJECTION INTRAVENOUS at 06:17

## 2017-02-11 RX ADMIN — Medication 3 MILLILITER(S): at 08:26

## 2017-02-11 RX ADMIN — Medication 1: at 08:26

## 2017-02-12 ENCOUNTER — RX RENEWAL (OUTPATIENT)
Age: 67
End: 2017-02-12

## 2017-02-13 LAB
CLAM IGE QN: <0.1 KUA/L — SIGNIFICANT CHANGE UP
CRAB IGE QN: <0.1 KUA/L — SIGNIFICANT CHANGE UP
DEPRECATED CLAM IGE RAST QL: 0 — SIGNIFICANT CHANGE UP
DEPRECATED CRAB IGE RAST QL: 0 — SIGNIFICANT CHANGE UP
DEPRECATED LOBSTER IGE RAST QL: 0 — SIGNIFICANT CHANGE UP
DEPRECATED SHRIMP IGE RAST QL: 0 — SIGNIFICANT CHANGE UP
LOBSTER IGE QN: <0.1 KUA/L — SIGNIFICANT CHANGE UP
MANNOSE BINDING LECTIN: 142 NG/ML — SIGNIFICANT CHANGE UP
SHRIMP IGE QN: <0.1 KUA/L — SIGNIFICANT CHANGE UP
TOTAL IGE SMQN RAST: SIGNIFICANT CHANGE UP
TRYPTASE SERPL-MCNC: 3.7 NG/ML — SIGNIFICANT CHANGE UP

## 2017-02-14 LAB — NBT BLD QL: SIGNIFICANT CHANGE UP

## 2017-02-15 ENCOUNTER — APPOINTMENT (OUTPATIENT)
Dept: PULMONOLOGY | Facility: CLINIC | Age: 67
End: 2017-02-15

## 2017-02-16 LAB — HAEM INFLU B AB SER-MCNC: 0.12 MG/L — SIGNIFICANT CHANGE UP

## 2017-02-20 ENCOUNTER — APPOINTMENT (OUTPATIENT)
Dept: PULMONOLOGY | Facility: CLINIC | Age: 67
End: 2017-02-20

## 2017-02-20 VITALS
BODY MASS INDEX: 27.62 KG/M2 | SYSTOLIC BLOOD PRESSURE: 120 MMHG | OXYGEN SATURATION: 96 % | HEIGHT: 67 IN | WEIGHT: 176 LBS | DIASTOLIC BLOOD PRESSURE: 70 MMHG | HEART RATE: 82 BPM | RESPIRATION RATE: 17 BRPM

## 2017-02-20 LAB
HAEM INFLU B AB SER-MCNC: 0.12 MG/L — SIGNIFICANT CHANGE UP
LYMPHOCYTE PROLIF MITOGEN PNL BLD FC: ABNORMAL
LYMPHOCYTE PROLIF PNL BLD: ABNORMAL
MISCELLANEOUS TEST NAME: SIGNIFICANT CHANGE UP
MISCELLANEOUS, NORMALS: SIGNIFICANT CHANGE UP
MISCELLANEOUS, RESULT: SIGNIFICANT CHANGE UP
MISCELLANEOUS, RESULT: SIGNIFICANT CHANGE UP

## 2017-02-20 RX ORDER — OMALIZUMAB 202.5 MG/1.4ML
150 INJECTION, SOLUTION SUBCUTANEOUS
Qty: 60 | Refills: 0 | Status: COMPLETED | OUTPATIENT
Start: 2017-02-20

## 2017-02-20 RX ADMIN — OMALIZUMAB 0 MG: 202.5 INJECTION, SOLUTION SUBCUTANEOUS at 00:00

## 2017-02-21 ENCOUNTER — LABORATORY RESULT (OUTPATIENT)
Age: 67
End: 2017-02-21

## 2017-02-21 ENCOUNTER — APPOINTMENT (OUTPATIENT)
Dept: PEDIATRIC ALLERGY IMMUNOLOGY | Facility: CLINIC | Age: 67
End: 2017-02-21

## 2017-02-21 VITALS
OXYGEN SATURATION: 95 % | HEART RATE: 84 BPM | HEIGHT: 66.97 IN | WEIGHT: 173 LBS | SYSTOLIC BLOOD PRESSURE: 150 MMHG | BODY MASS INDEX: 27.15 KG/M2 | DIASTOLIC BLOOD PRESSURE: 72 MMHG

## 2017-02-21 LAB
MISCELLANEOUS TEST NAME: SIGNIFICANT CHANGE UP
MISCELLANEOUS, NORMALS: SIGNIFICANT CHANGE UP
MISCELLANEOUS, RESULT: SIGNIFICANT CHANGE UP
MISCELLANEOUS, RESULT: SIGNIFICANT CHANGE UP

## 2017-02-23 LAB
A ALTERNATA IGE QN: <0.1 KUA/L
A FUMIGATUS IGE QN: 0.17 KUA/L
A NIGER IGE QN: 0.21 KUA/L
C ALBICANS IGE QN: <0.1 KUA/L
C HERBARUM IGE QN: <0.1 KUA/L
C LUNATA IGE QN: <0.1 KUA/L
CEPHALOSPORIN IGE QN: 0
CEPHALOSPORIN IGE QN: <0.1 KUA/L
DEPRECATED A ALTERNATA IGE RAST QL: 0
DEPRECATED A FUMIGATUS IGE RAST QL: NORMAL
DEPRECATED A NIGER IGE RAST QL: NORMAL (ref 0–?)
DEPRECATED A PULLULANS IGE RAST QL: 0
DEPRECATED C ALBICANS IGE RAST QL: 0
DEPRECATED C HERBARUM IGE RAST QL: 0
DEPRECATED C LUNATA IGE RAST QL: 0
DEPRECATED E PURPURASCENS IGE RAST QL: 0
DEPRECATED F MONILIFORME IGE RAST QL: 0
DEPRECATED M RACEMOSUS IGE RAST QL: 0
DEPRECATED R NIGRICANS IGE RAST QL: 0
DEPRECATED S BOTRYOSUM IGE RAST QL: 0
DEPRECATED S ROSTRATA IGE RAST QL: 0
E PURPURASCENS IGE QN: <0.1 KUA/L
F MONILIFORME IGE QN: <0.1 KUA/L
M RACEMOSUS IGE QN: <0.1 KUA/L
MISCELLANEOUS TEST NAME: SIGNIFICANT CHANGE UP
MISCELLANEOUS, NORMALS: SIGNIFICANT CHANGE UP
MISCELLANEOUS, RESULT: SIGNIFICANT CHANGE UP
MOLD (AUREOBASIDIUM M12) CONC: <0.1 KUA/L
R NIGRICANS IGE QN: <0.1 KUA/L
S BOTRYOSUM IGE QN: <0.1 KUA/L
S ROSTRATA IGE QN: <0.1 KUA/L
VZV AB TITR SER: POSITIVE
VZV IGG SER IF-ACNC: 533.1 INDEX

## 2017-02-24 LAB — HP PNL SER: NORMAL

## 2017-02-27 LAB
A FLAVUS AB FLD QL: NEGATIVE
A FUMIGATUS AB FLD QL: NEGATIVE
A NIGER AB FLD QL: NEGATIVE

## 2017-02-28 LAB — NBT BLD QL: NORMAL

## 2017-03-02 LAB
ASPERGILLUS FLAVUS PRECIPITINS: NEGATIVE
ASPERGILLUS FUMIGATES PRECIPTINS: NEGATIVE
ASPERGILLUS NIGER PRECIPITINS: NEGATIVE

## 2017-03-14 ENCOUNTER — APPOINTMENT (OUTPATIENT)
Dept: PULMONOLOGY | Facility: CLINIC | Age: 67
End: 2017-03-14

## 2017-03-16 ENCOUNTER — APPOINTMENT (OUTPATIENT)
Dept: CARDIOLOGY | Facility: CLINIC | Age: 67
End: 2017-03-16

## 2017-03-20 ENCOUNTER — APPOINTMENT (OUTPATIENT)
Dept: PULMONOLOGY | Facility: CLINIC | Age: 67
End: 2017-03-20

## 2017-03-20 VITALS
HEIGHT: 66 IN | RESPIRATION RATE: 16 BRPM | WEIGHT: 172 LBS | BODY MASS INDEX: 27.64 KG/M2 | SYSTOLIC BLOOD PRESSURE: 118 MMHG | HEART RATE: 88 BPM | DIASTOLIC BLOOD PRESSURE: 70 MMHG | OXYGEN SATURATION: 100 %

## 2017-03-20 RX ORDER — OMALIZUMAB 202.5 MG/1.4ML
150 INJECTION, SOLUTION SUBCUTANEOUS
Qty: 45 | Refills: 0 | Status: COMPLETED | OUTPATIENT
Start: 2017-03-20

## 2017-03-20 RX ADMIN — OMALIZUMAB 0 MG: 202.5 INJECTION, SOLUTION SUBCUTANEOUS at 00:00

## 2017-03-21 ENCOUNTER — OUTPATIENT (OUTPATIENT)
Dept: OUTPATIENT SERVICES | Facility: HOSPITAL | Age: 67
LOS: 1 days | End: 2017-03-21
Payer: MEDICARE

## 2017-03-21 ENCOUNTER — APPOINTMENT (OUTPATIENT)
Dept: CV DIAGNOSITCS | Facility: HOSPITAL | Age: 67
End: 2017-03-21

## 2017-03-21 DIAGNOSIS — Z96.653 PRESENCE OF ARTIFICIAL KNEE JOINT, BILATERAL: Chronic | ICD-10-CM

## 2017-03-21 DIAGNOSIS — I35.1 NONRHEUMATIC AORTIC (VALVE) INSUFFICIENCY: ICD-10-CM

## 2017-03-21 DIAGNOSIS — Z98.89 OTHER SPECIFIED POSTPROCEDURAL STATES: Chronic | ICD-10-CM

## 2017-03-21 DIAGNOSIS — H05.352 EXOSTOSIS OF LEFT ORBIT: Chronic | ICD-10-CM

## 2017-03-21 PROCEDURE — 93306 TTE W/DOPPLER COMPLETE: CPT

## 2017-03-21 PROCEDURE — 93306 TTE W/DOPPLER COMPLETE: CPT | Mod: 26

## 2017-03-28 ENCOUNTER — APPOINTMENT (OUTPATIENT)
Dept: PULMONOLOGY | Facility: CLINIC | Age: 67
End: 2017-03-28

## 2017-03-28 VITALS
HEART RATE: 95 BPM | RESPIRATION RATE: 17 BRPM | OXYGEN SATURATION: 98 % | HEIGHT: 66 IN | SYSTOLIC BLOOD PRESSURE: 115 MMHG | WEIGHT: 172 LBS | DIASTOLIC BLOOD PRESSURE: 65 MMHG | BODY MASS INDEX: 27.64 KG/M2

## 2017-03-28 RX ORDER — LEVOFLOXACIN 500 MG/1
500 TABLET, FILM COATED ORAL DAILY
Qty: 10 | Refills: 0 | Status: DISCONTINUED | COMMUNITY
Start: 2017-03-13 | End: 2017-03-28

## 2017-04-02 LAB — BACTERIA SPT CF RESP CULT: ABNORMAL

## 2017-04-03 ENCOUNTER — APPOINTMENT (OUTPATIENT)
Dept: PULMONOLOGY | Facility: CLINIC | Age: 67
End: 2017-04-03

## 2017-04-03 ENCOUNTER — NON-APPOINTMENT (OUTPATIENT)
Age: 67
End: 2017-04-03

## 2017-04-03 ENCOUNTER — APPOINTMENT (OUTPATIENT)
Dept: CARDIOLOGY | Facility: CLINIC | Age: 67
End: 2017-04-03

## 2017-04-03 ENCOUNTER — APPOINTMENT (OUTPATIENT)
Dept: PEDIATRIC ALLERGY IMMUNOLOGY | Facility: CLINIC | Age: 67
End: 2017-04-03

## 2017-04-03 VITALS
HEART RATE: 77 BPM | BODY MASS INDEX: 27.64 KG/M2 | HEIGHT: 65.98 IN | OXYGEN SATURATION: 95 % | DIASTOLIC BLOOD PRESSURE: 75 MMHG | WEIGHT: 171.98 LBS | SYSTOLIC BLOOD PRESSURE: 133 MMHG

## 2017-04-03 VITALS
DIASTOLIC BLOOD PRESSURE: 72 MMHG | HEIGHT: 66 IN | BODY MASS INDEX: 27.64 KG/M2 | OXYGEN SATURATION: 99 % | SYSTOLIC BLOOD PRESSURE: 116 MMHG | WEIGHT: 172 LBS | HEART RATE: 74 BPM

## 2017-04-03 RX ORDER — OMALIZUMAB 202.5 MG/1.4ML
150 INJECTION, SOLUTION SUBCUTANEOUS
Qty: 45 | Refills: 0 | Status: COMPLETED | OUTPATIENT
Start: 2017-04-03

## 2017-04-03 RX ADMIN — OMALIZUMAB 0 MG: 202.5 INJECTION, SOLUTION SUBCUTANEOUS at 00:00

## 2017-04-05 ENCOUNTER — APPOINTMENT (OUTPATIENT)
Dept: COLORECTAL SURGERY | Facility: CLINIC | Age: 67
End: 2017-04-05

## 2017-04-05 VITALS
SYSTOLIC BLOOD PRESSURE: 138 MMHG | HEART RATE: 66 BPM | HEIGHT: 67.5 IN | WEIGHT: 172 LBS | RESPIRATION RATE: 16 BRPM | BODY MASS INDEX: 26.68 KG/M2 | DIASTOLIC BLOOD PRESSURE: 82 MMHG

## 2017-04-10 ENCOUNTER — OUTPATIENT (OUTPATIENT)
Dept: OUTPATIENT SERVICES | Facility: HOSPITAL | Age: 67
LOS: 1 days | Discharge: HOME | End: 2017-04-10

## 2017-04-10 ENCOUNTER — RESULT REVIEW (OUTPATIENT)
Age: 67
End: 2017-04-10

## 2017-04-10 ENCOUNTER — OUTPATIENT (OUTPATIENT)
Dept: OUTPATIENT SERVICES | Facility: HOSPITAL | Age: 67
LOS: 1 days | Discharge: HOME | End: 2017-04-10
Payer: MEDICARE

## 2017-04-10 DIAGNOSIS — C18.9 MALIGNANT NEOPLASM OF COLON, UNSPECIFIED: ICD-10-CM

## 2017-04-10 DIAGNOSIS — Z96.653 PRESENCE OF ARTIFICIAL KNEE JOINT, BILATERAL: Chronic | ICD-10-CM

## 2017-04-10 DIAGNOSIS — Z98.89 OTHER SPECIFIED POSTPROCEDURAL STATES: Chronic | ICD-10-CM

## 2017-04-10 DIAGNOSIS — H05.352 EXOSTOSIS OF LEFT ORBIT: Chronic | ICD-10-CM

## 2017-04-11 ENCOUNTER — LABORATORY RESULT (OUTPATIENT)
Age: 67
End: 2017-04-11

## 2017-04-11 ENCOUNTER — RESULT REVIEW (OUTPATIENT)
Age: 67
End: 2017-04-11

## 2017-04-11 ENCOUNTER — APPOINTMENT (OUTPATIENT)
Dept: INFUSION THERAPY | Facility: HOSPITAL | Age: 67
End: 2017-04-11

## 2017-04-11 ENCOUNTER — APPOINTMENT (OUTPATIENT)
Dept: PULMONOLOGY | Facility: CLINIC | Age: 67
End: 2017-04-11

## 2017-04-11 ENCOUNTER — APPOINTMENT (OUTPATIENT)
Dept: HEMATOLOGY ONCOLOGY | Facility: CLINIC | Age: 67
End: 2017-04-11

## 2017-04-11 VITALS
HEART RATE: 75 BPM | HEIGHT: 66.46 IN | WEIGHT: 175.93 LBS | SYSTOLIC BLOOD PRESSURE: 164 MMHG | DIASTOLIC BLOOD PRESSURE: 79 MMHG | OXYGEN SATURATION: 96 % | RESPIRATION RATE: 16 BRPM | BODY MASS INDEX: 27.94 KG/M2 | TEMPERATURE: 98.2 F

## 2017-04-11 VITALS
OXYGEN SATURATION: 98 % | RESPIRATION RATE: 16 BRPM | HEIGHT: 66 IN | WEIGHT: 175 LBS | DIASTOLIC BLOOD PRESSURE: 70 MMHG | SYSTOLIC BLOOD PRESSURE: 145 MMHG | BODY MASS INDEX: 28.12 KG/M2 | HEART RATE: 98 BPM

## 2017-04-11 DIAGNOSIS — Z80.3 FAMILY HISTORY OF MALIGNANT NEOPLASM OF BREAST: ICD-10-CM

## 2017-04-11 LAB
BASOPHILS # BLD AUTO: 0 K/UL — SIGNIFICANT CHANGE UP (ref 0–0.2)
BASOPHILS NFR BLD AUTO: 0.3 % — SIGNIFICANT CHANGE UP (ref 0–2)
EOSINOPHIL # BLD AUTO: 0.1 K/UL — SIGNIFICANT CHANGE UP (ref 0–0.5)
EOSINOPHIL NFR BLD AUTO: 1.2 % — SIGNIFICANT CHANGE UP (ref 0–6)
HCT VFR BLD CALC: 26.6 % — LOW (ref 34.5–45)
HGB BLD-MCNC: 8.4 G/DL — LOW (ref 11.5–15.5)
LYMPHOCYTES # BLD AUTO: 1.5 K/UL — SIGNIFICANT CHANGE UP (ref 1–3.3)
LYMPHOCYTES # BLD AUTO: 15.9 % — SIGNIFICANT CHANGE UP (ref 13–44)
MCHC RBC-ENTMCNC: 19.7 PG — LOW (ref 27–34)
MCHC RBC-ENTMCNC: 31.4 G/DL — LOW (ref 32–36)
MCV RBC AUTO: 62.8 FL — LOW (ref 80–100)
MONOCYTES # BLD AUTO: 0.9 K/UL — SIGNIFICANT CHANGE UP (ref 0–0.9)
MONOCYTES NFR BLD AUTO: 9.3 % — SIGNIFICANT CHANGE UP (ref 2–14)
NEUTROPHILS # BLD AUTO: 7 K/UL — SIGNIFICANT CHANGE UP (ref 1.8–7.4)
NEUTROPHILS NFR BLD AUTO: 73.2 % — SIGNIFICANT CHANGE UP (ref 43–77)
PLATELET # BLD AUTO: 402 K/UL — HIGH (ref 150–400)
RBC # BLD: 4.24 M/UL — SIGNIFICANT CHANGE UP (ref 3.8–5.2)
RBC # FLD: 16.6 % — HIGH (ref 10.3–14.5)
WBC # BLD: 9.6 K/UL — SIGNIFICANT CHANGE UP (ref 3.8–10.5)
WBC # FLD AUTO: 9.6 K/UL — SIGNIFICANT CHANGE UP (ref 3.8–10.5)

## 2017-04-12 ENCOUNTER — APPOINTMENT (OUTPATIENT)
Dept: PULMONOLOGY | Facility: CLINIC | Age: 67
End: 2017-04-12

## 2017-04-12 ENCOUNTER — APPOINTMENT (OUTPATIENT)
Dept: RADIATION ONCOLOGY | Facility: CLINIC | Age: 67
End: 2017-04-12

## 2017-04-12 VITALS
WEIGHT: 176.26 LBS | SYSTOLIC BLOOD PRESSURE: 144 MMHG | HEIGHT: 67 IN | DIASTOLIC BLOOD PRESSURE: 73 MMHG | RESPIRATION RATE: 16 BRPM | HEART RATE: 80 BPM | BODY MASS INDEX: 27.66 KG/M2 | OXYGEN SATURATION: 99 %

## 2017-04-12 LAB
DEPRECATED S PNEUM 1 IGG SER-MCNC: 135.2 MCG/ML
DEPRECATED S PNEUM12 AB SER-ACNC: 1.1 MCG/ML
DEPRECATED S PNEUM14 AB SER-ACNC: 81.9 MCG/ML
DEPRECATED S PNEUM17 IGG SER IA-MCNC: NORMAL MCG/ML
DEPRECATED S PNEUM18 IGG SER IA-MCNC: 7.8 MCG/ML
DEPRECATED S PNEUM19 IGG SER-MCNC: 9.3 MCG/ML
DEPRECATED S PNEUM19 IGG SER-MCNC: NORMAL MCG/ML
DEPRECATED S PNEUM2 IGG SER-MCNC: 0.5 MCG/ML
DEPRECATED S PNEUM20 IGG SER-MCNC: 0.4 MCG/ML
DEPRECATED S PNEUM22 IGG SER-MCNC: 6.8 MCG/ML
DEPRECATED S PNEUM23 AB SER-ACNC: 2.9 MCG/ML
DEPRECATED S PNEUM3 AB SER-ACNC: 1.6 MCG/ML
DEPRECATED S PNEUM34 IGG SER-MCNC: 0.6 MCG/ML
DEPRECATED S PNEUM4 AB SER-ACNC: 0.2 MCG/ML
DEPRECATED S PNEUM5 IGG SER-MCNC: >705.8 MCG/ML
DEPRECATED S PNEUM6 IGG SER-MCNC: 20.3 MCG/ML
DEPRECATED S PNEUM7 IGG SER-ACNC: 4.2 MCG/ML
DEPRECATED S PNEUM8 AB SER-ACNC: 0.4 MCG/ML
DEPRECATED S PNEUM9 AB SER-ACNC: 0.4 MCG/ML
DEPRECATED S PNEUM9 IGG SER-MCNC: 3.7 MCG/ML
HAEM INFLU B AB SER-MCNC: 4.86 MG/L
STREPTOCOCCUS PNEUMONIAE SEROTYPE 11A: 94.9 MCG/ML
STREPTOCOCCUS PNEUMONIAE SEROTYPE 15B: 1.9 MCG/ML
STREPTOCOCCUS PNEUMONIAE SEROTYPE 33F: 0.5 MCG/ML

## 2017-04-12 PROCEDURE — 88321 CONSLTJ&REPRT SLD PREP ELSWR: CPT

## 2017-04-13 ENCOUNTER — FORM ENCOUNTER (OUTPATIENT)
Age: 67
End: 2017-04-13

## 2017-04-14 ENCOUNTER — OUTPATIENT (OUTPATIENT)
Dept: OUTPATIENT SERVICES | Facility: HOSPITAL | Age: 67
LOS: 1 days | End: 2017-04-14
Payer: MEDICARE

## 2017-04-14 ENCOUNTER — APPOINTMENT (OUTPATIENT)
Dept: MRI IMAGING | Facility: IMAGING CENTER | Age: 67
End: 2017-04-14

## 2017-04-14 DIAGNOSIS — Z98.89 OTHER SPECIFIED POSTPROCEDURAL STATES: Chronic | ICD-10-CM

## 2017-04-14 DIAGNOSIS — H05.352 EXOSTOSIS OF LEFT ORBIT: Chronic | ICD-10-CM

## 2017-04-14 DIAGNOSIS — Z96.653 PRESENCE OF ARTIFICIAL KNEE JOINT, BILATERAL: Chronic | ICD-10-CM

## 2017-04-14 DIAGNOSIS — Z00.8 ENCOUNTER FOR OTHER GENERAL EXAMINATION: ICD-10-CM

## 2017-04-14 PROCEDURE — A9585: CPT

## 2017-04-14 PROCEDURE — 72197 MRI PELVIS W/O & W/DYE: CPT

## 2017-04-18 ENCOUNTER — MED ADMIN CHARGE (OUTPATIENT)
Age: 67
End: 2017-04-18

## 2017-04-18 ENCOUNTER — APPOINTMENT (OUTPATIENT)
Dept: PULMONOLOGY | Facility: CLINIC | Age: 67
End: 2017-04-18

## 2017-04-18 VITALS
DIASTOLIC BLOOD PRESSURE: 60 MMHG | SYSTOLIC BLOOD PRESSURE: 100 MMHG | WEIGHT: 176 LBS | BODY MASS INDEX: 27.62 KG/M2 | HEIGHT: 67 IN | RESPIRATION RATE: 16 BRPM | HEART RATE: 79 BPM | OXYGEN SATURATION: 97 %

## 2017-04-18 RX ORDER — OMALIZUMAB 202.5 MG/1.4ML
150 INJECTION, SOLUTION SUBCUTANEOUS
Qty: 45 | Refills: 0 | Status: COMPLETED | OUTPATIENT
Start: 2017-04-18

## 2017-04-18 RX ADMIN — OMALIZUMAB 0 MG: 202.5 INJECTION, SOLUTION SUBCUTANEOUS at 00:00

## 2017-04-24 ENCOUNTER — RESULT REVIEW (OUTPATIENT)
Age: 67
End: 2017-04-24

## 2017-04-25 ENCOUNTER — APPOINTMENT (OUTPATIENT)
Dept: HEMATOLOGY ONCOLOGY | Facility: CLINIC | Age: 67
End: 2017-04-25

## 2017-04-25 ENCOUNTER — OUTPATIENT (OUTPATIENT)
Dept: OUTPATIENT SERVICES | Facility: HOSPITAL | Age: 67
LOS: 1 days | End: 2017-04-25

## 2017-04-25 VITALS
DIASTOLIC BLOOD PRESSURE: 71 MMHG | WEIGHT: 176.37 LBS | OXYGEN SATURATION: 97 % | HEART RATE: 88 BPM | BODY MASS INDEX: 27.62 KG/M2 | RESPIRATION RATE: 16 BRPM | TEMPERATURE: 98.2 F | SYSTOLIC BLOOD PRESSURE: 136 MMHG

## 2017-04-25 DIAGNOSIS — Z98.89 OTHER SPECIFIED POSTPROCEDURAL STATES: Chronic | ICD-10-CM

## 2017-04-25 DIAGNOSIS — C18.9 MALIGNANT NEOPLASM OF COLON, UNSPECIFIED: ICD-10-CM

## 2017-04-25 DIAGNOSIS — H05.352 EXOSTOSIS OF LEFT ORBIT: Chronic | ICD-10-CM

## 2017-04-25 DIAGNOSIS — Z96.653 PRESENCE OF ARTIFICIAL KNEE JOINT, BILATERAL: Chronic | ICD-10-CM

## 2017-04-25 LAB
BASOPHILS # BLD AUTO: 0 K/UL — SIGNIFICANT CHANGE UP (ref 0–0.2)
BASOPHILS NFR BLD AUTO: 0.1 % — SIGNIFICANT CHANGE UP (ref 0–2)
EOSINOPHIL # BLD AUTO: 0.5 K/UL — SIGNIFICANT CHANGE UP (ref 0–0.5)
EOSINOPHIL NFR BLD AUTO: 4.3 % — SIGNIFICANT CHANGE UP (ref 0–6)
HCT VFR BLD CALC: 31.4 % — LOW (ref 34.5–45)
HGB BLD-MCNC: 9.4 G/DL — LOW (ref 11.5–15.5)
LYMPHOCYTES # BLD AUTO: 19 % — SIGNIFICANT CHANGE UP (ref 13–44)
LYMPHOCYTES # BLD AUTO: 2.1 K/UL — SIGNIFICANT CHANGE UP (ref 1–3.3)
MCHC RBC-ENTMCNC: 20.3 PG — LOW (ref 27–34)
MCHC RBC-ENTMCNC: 29.8 G/DL — LOW (ref 32–36)
MCV RBC AUTO: 68.2 FL — LOW (ref 80–100)
MONOCYTES # BLD AUTO: 1.3 K/UL — HIGH (ref 0–0.9)
MONOCYTES NFR BLD AUTO: 11.8 % — SIGNIFICANT CHANGE UP (ref 2–14)
NEUTROPHILS # BLD AUTO: 7 K/UL — SIGNIFICANT CHANGE UP (ref 1.8–7.4)
NEUTROPHILS NFR BLD AUTO: 64.8 % — SIGNIFICANT CHANGE UP (ref 43–77)
PLATELET # BLD AUTO: 523 K/UL — HIGH (ref 150–400)
RBC # BLD: 4.6 M/UL — SIGNIFICANT CHANGE UP (ref 3.8–5.2)
RBC # FLD: 22.5 % — HIGH (ref 10.3–14.5)
WBC # BLD: 10.9 K/UL — HIGH (ref 3.8–10.5)
WBC # FLD AUTO: 10.9 K/UL — HIGH (ref 3.8–10.5)

## 2017-05-01 ENCOUNTER — OTHER (OUTPATIENT)
Age: 67
End: 2017-05-01

## 2017-05-02 ENCOUNTER — MED ADMIN CHARGE (OUTPATIENT)
Age: 67
End: 2017-05-02

## 2017-05-02 ENCOUNTER — APPOINTMENT (OUTPATIENT)
Dept: PULMONOLOGY | Facility: CLINIC | Age: 67
End: 2017-05-02

## 2017-05-02 ENCOUNTER — RESULT REVIEW (OUTPATIENT)
Age: 67
End: 2017-05-02

## 2017-05-02 VITALS
DIASTOLIC BLOOD PRESSURE: 62 MMHG | SYSTOLIC BLOOD PRESSURE: 128 MMHG | WEIGHT: 176 LBS | HEIGHT: 67 IN | BODY MASS INDEX: 27.62 KG/M2 | HEART RATE: 96 BPM | OXYGEN SATURATION: 98 % | RESPIRATION RATE: 16 BRPM

## 2017-05-02 RX ORDER — OMALIZUMAB 202.5 MG/1.4ML
150 INJECTION, SOLUTION SUBCUTANEOUS
Qty: 45 | Refills: 0 | Status: COMPLETED | OUTPATIENT
Start: 2017-05-02

## 2017-05-02 RX ADMIN — OMALIZUMAB 0 MG: 202.5 INJECTION, SOLUTION SUBCUTANEOUS at 00:00

## 2017-05-03 ENCOUNTER — APPOINTMENT (OUTPATIENT)
Dept: INFUSION THERAPY | Facility: HOSPITAL | Age: 67
End: 2017-05-03

## 2017-05-03 ENCOUNTER — LABORATORY RESULT (OUTPATIENT)
Age: 67
End: 2017-05-03

## 2017-05-03 LAB
ANISOCYTOSIS BLD QL: SLIGHT — SIGNIFICANT CHANGE UP
BASOPHILS # BLD AUTO: 0 K/UL — SIGNIFICANT CHANGE UP (ref 0–0.2)
BASOPHILS NFR BLD AUTO: 0.2 % — SIGNIFICANT CHANGE UP (ref 0–2)
DACRYOCYTES BLD QL SMEAR: SLIGHT — SIGNIFICANT CHANGE UP
ELLIPTOCYTES BLD QL SMEAR: SLIGHT — SIGNIFICANT CHANGE UP
EOSINOPHIL # BLD AUTO: 0.2 K/UL — SIGNIFICANT CHANGE UP (ref 0–0.5)
EOSINOPHIL NFR BLD AUTO: 2.3 % — SIGNIFICANT CHANGE UP (ref 0–6)
HCT VFR BLD CALC: 32.5 % — LOW (ref 34.5–45)
HGB BLD-MCNC: 10 G/DL — LOW (ref 11.5–15.5)
HYPOCHROMIA BLD QL: SIGNIFICANT CHANGE UP
LYMPHOCYTES # BLD AUTO: 1.3 K/UL — SIGNIFICANT CHANGE UP (ref 1–3.3)
LYMPHOCYTES # BLD AUTO: 12.2 % — LOW (ref 13–44)
MACROCYTES BLD QL: SLIGHT — SIGNIFICANT CHANGE UP
MCHC RBC-ENTMCNC: 21.4 PG — LOW (ref 27–34)
MCHC RBC-ENTMCNC: 30.8 G/DL — LOW (ref 32–36)
MCV RBC AUTO: 69.4 FL — LOW (ref 80–100)
MICROCYTES BLD QL: SIGNIFICANT CHANGE UP
MONOCYTES # BLD AUTO: 0.7 K/UL — SIGNIFICANT CHANGE UP (ref 0–0.9)
MONOCYTES NFR BLD AUTO: 6.7 % — SIGNIFICANT CHANGE UP (ref 2–14)
NEUTROPHILS # BLD AUTO: 8.5 K/UL — HIGH (ref 1.8–7.4)
NEUTROPHILS NFR BLD AUTO: 78.7 % — HIGH (ref 43–77)
PLAT MORPH BLD: NORMAL — SIGNIFICANT CHANGE UP
PLATELET # BLD AUTO: 430 K/UL — HIGH (ref 150–400)
POIKILOCYTOSIS BLD QL AUTO: SLIGHT — SIGNIFICANT CHANGE UP
POLYCHROMASIA BLD QL SMEAR: SLIGHT — SIGNIFICANT CHANGE UP
RBC # BLD: 4.68 M/UL — SIGNIFICANT CHANGE UP (ref 3.8–5.2)
RBC # FLD: 22 % — HIGH (ref 10.3–14.5)
RBC BLD AUTO: ABNORMAL
SCHISTOCYTES BLD QL AUTO: SLIGHT — SIGNIFICANT CHANGE UP
WBC # BLD: 10.9 K/UL — HIGH (ref 3.8–10.5)
WBC # FLD AUTO: 10.9 K/UL — HIGH (ref 3.8–10.5)

## 2017-05-04 DIAGNOSIS — Z51.11 ENCOUNTER FOR ANTINEOPLASTIC CHEMOTHERAPY: ICD-10-CM

## 2017-05-04 DIAGNOSIS — R11.2 NAUSEA WITH VOMITING, UNSPECIFIED: ICD-10-CM

## 2017-05-08 ENCOUNTER — OTHER (OUTPATIENT)
Age: 67
End: 2017-05-08

## 2017-05-09 ENCOUNTER — OUTPATIENT (OUTPATIENT)
Dept: OUTPATIENT SERVICES | Facility: HOSPITAL | Age: 67
LOS: 1 days | Discharge: HOME | End: 2017-05-09

## 2017-05-09 DIAGNOSIS — Z98.89 OTHER SPECIFIED POSTPROCEDURAL STATES: Chronic | ICD-10-CM

## 2017-05-09 DIAGNOSIS — H05.352 EXOSTOSIS OF LEFT ORBIT: Chronic | ICD-10-CM

## 2017-05-09 DIAGNOSIS — C18.9 MALIGNANT NEOPLASM OF COLON, UNSPECIFIED: ICD-10-CM

## 2017-05-09 DIAGNOSIS — Z96.653 PRESENCE OF ARTIFICIAL KNEE JOINT, BILATERAL: Chronic | ICD-10-CM

## 2017-05-09 LAB
MISCELLANEOUS TEST: NORMAL
PROC NAME: NORMAL

## 2017-05-11 ENCOUNTER — EMERGENCY (EMERGENCY)
Facility: HOSPITAL | Age: 67
LOS: 1 days | Discharge: HOME | End: 2017-05-11
Attending: EMERGENCY MEDICINE | Admitting: EMERGENCY MEDICINE
Payer: MEDICARE

## 2017-05-11 ENCOUNTER — APPOINTMENT (OUTPATIENT)
Dept: HEMATOLOGY ONCOLOGY | Facility: CLINIC | Age: 67
End: 2017-05-11

## 2017-05-11 VITALS
TEMPERATURE: 99 F | RESPIRATION RATE: 14 BRPM | HEART RATE: 81 BPM | SYSTOLIC BLOOD PRESSURE: 118 MMHG | DIASTOLIC BLOOD PRESSURE: 51 MMHG | OXYGEN SATURATION: 100 %

## 2017-05-11 VITALS
TEMPERATURE: 98 F | RESPIRATION RATE: 16 BRPM | SYSTOLIC BLOOD PRESSURE: 137 MMHG | HEART RATE: 65 BPM | OXYGEN SATURATION: 99 % | DIASTOLIC BLOOD PRESSURE: 50 MMHG

## 2017-05-11 VITALS
DIASTOLIC BLOOD PRESSURE: 78 MMHG | RESPIRATION RATE: 18 BRPM | SYSTOLIC BLOOD PRESSURE: 148 MMHG | HEART RATE: 78 BPM | OXYGEN SATURATION: 98 %

## 2017-05-11 DIAGNOSIS — Z98.89 OTHER SPECIFIED POSTPROCEDURAL STATES: Chronic | ICD-10-CM

## 2017-05-11 DIAGNOSIS — Z96.653 PRESENCE OF ARTIFICIAL KNEE JOINT, BILATERAL: Chronic | ICD-10-CM

## 2017-05-11 DIAGNOSIS — H05.352 EXOSTOSIS OF LEFT ORBIT: Chronic | ICD-10-CM

## 2017-05-11 LAB
ALBUMIN SERPL ELPH-MCNC: 3.6 G/DL — SIGNIFICANT CHANGE UP (ref 3.3–5)
ALP SERPL-CCNC: 81 U/L — SIGNIFICANT CHANGE UP (ref 40–120)
ALT FLD-CCNC: 12 U/L — SIGNIFICANT CHANGE UP (ref 4–33)
ANISOCYTOSIS BLD QL: SIGNIFICANT CHANGE UP
APPEARANCE UR: CLEAR — SIGNIFICANT CHANGE UP
AST SERPL-CCNC: 15 U/L — SIGNIFICANT CHANGE UP (ref 4–32)
BASOPHILS # BLD AUTO: 0.01 K/UL — SIGNIFICANT CHANGE UP (ref 0–0.2)
BASOPHILS NFR BLD AUTO: 0.1 % — SIGNIFICANT CHANGE UP (ref 0–2)
BASOPHILS NFR SPEC: 0 % — SIGNIFICANT CHANGE UP (ref 0–2)
BILIRUB SERPL-MCNC: 0.2 MG/DL — SIGNIFICANT CHANGE UP (ref 0.2–1.2)
BILIRUB UR-MCNC: NEGATIVE — SIGNIFICANT CHANGE UP
BLOOD UR QL VISUAL: NEGATIVE — SIGNIFICANT CHANGE UP
BUN SERPL-MCNC: 9 MG/DL — SIGNIFICANT CHANGE UP (ref 7–23)
BURR CELLS BLD QL SMEAR: PRESENT — SIGNIFICANT CHANGE UP
CALCIUM SERPL-MCNC: 8.9 MG/DL — SIGNIFICANT CHANGE UP (ref 8.4–10.5)
CHLORIDE SERPL-SCNC: 106 MMOL/L — SIGNIFICANT CHANGE UP (ref 98–107)
CK MB BLD-MCNC: 2.66 NG/ML — SIGNIFICANT CHANGE UP (ref 1–4.7)
CK MB BLD-MCNC: 2.86 NG/ML — SIGNIFICANT CHANGE UP (ref 1–4.7)
CK SERPL-CCNC: 138 U/L — SIGNIFICANT CHANGE UP (ref 25–170)
CK SERPL-CCNC: 144 U/L — SIGNIFICANT CHANGE UP (ref 25–170)
CO2 SERPL-SCNC: 26 MMOL/L — SIGNIFICANT CHANGE UP (ref 22–31)
COLOR SPEC: SIGNIFICANT CHANGE UP
CREAT SERPL-MCNC: 0.65 MG/DL — SIGNIFICANT CHANGE UP (ref 0.5–1.3)
EOSINOPHIL # BLD AUTO: 0.03 K/UL — SIGNIFICANT CHANGE UP (ref 0–0.5)
EOSINOPHIL NFR BLD AUTO: 0.4 % — SIGNIFICANT CHANGE UP (ref 0–6)
EOSINOPHIL NFR FLD: 0 % — SIGNIFICANT CHANGE UP (ref 0–6)
GIANT PLATELETS BLD QL SMEAR: PRESENT — SIGNIFICANT CHANGE UP
GLUCOSE SERPL-MCNC: 203 MG/DL — HIGH (ref 70–99)
GLUCOSE UR-MCNC: NEGATIVE — SIGNIFICANT CHANGE UP
HCT VFR BLD CALC: 31.9 % — LOW (ref 34.5–45)
HGB BLD-MCNC: 9.4 G/DL — LOW (ref 11.5–15.5)
IMM GRANULOCYTES NFR BLD AUTO: 0.6 % — SIGNIFICANT CHANGE UP (ref 0–1.5)
KETONES UR-MCNC: NEGATIVE — SIGNIFICANT CHANGE UP
LEUKOCYTE ESTERASE UR-ACNC: HIGH
LYMPHOCYTES # BLD AUTO: 0.66 K/UL — LOW (ref 1–3.3)
LYMPHOCYTES # BLD AUTO: 9.6 % — LOW (ref 13–44)
LYMPHOCYTES NFR SPEC AUTO: 6.1 % — LOW (ref 13–44)
MACROCYTES BLD QL: SLIGHT — SIGNIFICANT CHANGE UP
MCHC RBC-ENTMCNC: 20.7 PG — LOW (ref 27–34)
MCHC RBC-ENTMCNC: 29.5 % — LOW (ref 32–36)
MCV RBC AUTO: 70.1 FL — LOW (ref 80–100)
MICROCYTES BLD QL: SLIGHT — SIGNIFICANT CHANGE UP
MONOCYTES # BLD AUTO: 0.22 K/UL — SIGNIFICANT CHANGE UP (ref 0–0.9)
MONOCYTES NFR BLD AUTO: 3.2 % — SIGNIFICANT CHANGE UP (ref 2–14)
MONOCYTES NFR BLD: 6.1 % — SIGNIFICANT CHANGE UP (ref 2–9)
NEUTROPHIL AB SER-ACNC: 86.9 % — HIGH (ref 43–77)
NEUTROPHILS # BLD AUTO: 5.89 K/UL — SIGNIFICANT CHANGE UP (ref 1.8–7.4)
NEUTROPHILS NFR BLD AUTO: 86.1 % — HIGH (ref 43–77)
NITRITE UR-MCNC: NEGATIVE — SIGNIFICANT CHANGE UP
NON-SQ EPI CELLS # UR AUTO: 1 — SIGNIFICANT CHANGE UP
PH UR: 6.5 — SIGNIFICANT CHANGE UP (ref 4.6–8)
PLATELET # BLD AUTO: 297 K/UL — SIGNIFICANT CHANGE UP (ref 150–400)
PLATELET COUNT - ESTIMATE: NORMAL — SIGNIFICANT CHANGE UP
PMV BLD: 9.3 FL — SIGNIFICANT CHANGE UP (ref 7–13)
POIKILOCYTOSIS BLD QL AUTO: SIGNIFICANT CHANGE UP
POTASSIUM SERPL-MCNC: 4.5 MMOL/L — SIGNIFICANT CHANGE UP (ref 3.5–5.3)
POTASSIUM SERPL-SCNC: 4.5 MMOL/L — SIGNIFICANT CHANGE UP (ref 3.5–5.3)
PROT SERPL-MCNC: 6.6 G/DL — SIGNIFICANT CHANGE UP (ref 6–8.3)
PROT UR-MCNC: NEGATIVE — SIGNIFICANT CHANGE UP
RBC # BLD: 4.55 M/UL — SIGNIFICANT CHANGE UP (ref 3.8–5.2)
RBC # FLD: 23.7 % — HIGH (ref 10.3–14.5)
RBC CASTS # UR COMP ASSIST: SIGNIFICANT CHANGE UP (ref 0–?)
SODIUM SERPL-SCNC: 143 MMOL/L — SIGNIFICANT CHANGE UP (ref 135–145)
SP GR SPEC: 1.01 — SIGNIFICANT CHANGE UP (ref 1–1.03)
SQUAMOUS # UR AUTO: SIGNIFICANT CHANGE UP
TROPONIN T SERPL-MCNC: < 0.06 NG/ML — SIGNIFICANT CHANGE UP (ref 0–0.06)
TROPONIN T SERPL-MCNC: < 0.06 NG/ML — SIGNIFICANT CHANGE UP (ref 0–0.06)
UROBILINOGEN FLD QL: NORMAL E.U. — SIGNIFICANT CHANGE UP (ref 0.1–0.2)
VARIANT LYMPHS # BLD: 0.9 % — SIGNIFICANT CHANGE UP
WBC # BLD: 6.85 K/UL — SIGNIFICANT CHANGE UP (ref 3.8–10.5)
WBC # FLD AUTO: 6.85 K/UL — SIGNIFICANT CHANGE UP (ref 3.8–10.5)
WBC UR QL: SIGNIFICANT CHANGE UP (ref 0–?)

## 2017-05-11 PROCEDURE — 93010 ELECTROCARDIOGRAM REPORT: CPT

## 2017-05-11 PROCEDURE — 99285 EMERGENCY DEPT VISIT HI MDM: CPT | Mod: 25,GC

## 2017-05-11 PROCEDURE — 74176 CT ABD & PELVIS W/O CONTRAST: CPT | Mod: 26

## 2017-05-11 PROCEDURE — 71020: CPT | Mod: 26

## 2017-05-11 PROCEDURE — 74010: CPT | Mod: 26

## 2017-05-11 RX ORDER — FAMOTIDINE 10 MG/ML
20 INJECTION INTRAVENOUS ONCE
Qty: 0 | Refills: 0 | Status: COMPLETED | OUTPATIENT
Start: 2017-05-11 | End: 2017-05-11

## 2017-05-11 RX ORDER — DIPHENHYDRAMINE HCL 50 MG
25 CAPSULE ORAL ONCE
Qty: 0 | Refills: 0 | Status: COMPLETED | OUTPATIENT
Start: 2017-05-11 | End: 2017-05-11

## 2017-05-11 RX ORDER — ACETAMINOPHEN 500 MG
1000 TABLET ORAL ONCE
Qty: 0 | Refills: 0 | Status: COMPLETED | OUTPATIENT
Start: 2017-05-11 | End: 2017-05-11

## 2017-05-11 RX ORDER — SODIUM CHLORIDE 9 MG/ML
1000 INJECTION INTRAMUSCULAR; INTRAVENOUS; SUBCUTANEOUS ONCE
Qty: 0 | Refills: 0 | Status: COMPLETED | OUTPATIENT
Start: 2017-05-11 | End: 2017-05-11

## 2017-05-11 RX ORDER — ONDANSETRON 8 MG/1
4 TABLET, FILM COATED ORAL ONCE
Qty: 0 | Refills: 0 | Status: COMPLETED | OUTPATIENT
Start: 2017-05-11 | End: 2017-05-11

## 2017-05-11 RX ADMIN — Medication 400 MILLIGRAM(S): at 18:05

## 2017-05-11 RX ADMIN — Medication 125 MILLIGRAM(S): at 18:05

## 2017-05-11 RX ADMIN — Medication 25 MILLIGRAM(S): at 18:05

## 2017-05-11 RX ADMIN — SODIUM CHLORIDE 1000 MILLILITER(S): 9 INJECTION INTRAMUSCULAR; INTRAVENOUS; SUBCUTANEOUS at 17:28

## 2017-05-11 RX ADMIN — ONDANSETRON 4 MILLIGRAM(S): 8 TABLET, FILM COATED ORAL at 17:28

## 2017-05-11 RX ADMIN — Medication 25 MILLIGRAM(S): at 19:31

## 2017-05-11 RX ADMIN — Medication 1000 MILLIGRAM(S): at 19:23

## 2017-05-11 RX ADMIN — FAMOTIDINE 20 MILLIGRAM(S): 10 INJECTION INTRAVENOUS at 18:05

## 2017-05-11 NOTE — ED PROVIDER NOTE - ATTENDING CONTRIBUTION TO CARE
Attending Note: I have discussed with the resident the interview, evaluation, assessment and physical examination. I agree with the conclusions and medical plan. I have personally interviewed and examined the patient.

## 2017-05-11 NOTE — ED PROVIDER NOTE - PLAN OF CARE
- Follow up with your primary care doctor within 2-3 days, bring your results with you.  - For constipation we also recommend a diet high in fiber (beans, fruits, vegetables, whole grains).  If the diet does not work then please take Docusate (also called colace) 100mg 1-2 times a day as needed. This medication is available over the counter. Consider getting Magnesium Citrate, it is available over the counter. Drink half the bottle (150ml), if you do not have a bowel movement within 30 minutes then take the remaining half.   - Return to the Emergency Department for any new or worsening symptoms.

## 2017-05-11 NOTE — ED ADULT NURSE NOTE - OBJECTIVE STATEMENT
received pt A&Ox3 in no apparent distress at this time. R ACW port accessed with no complication. bloods drawn and sent to the lab. vss. cardiac monitor in place. MD at bedside. endorsed to primary RN.

## 2017-05-11 NOTE — ED PROVIDER NOTE - PMH
Adrenal insufficiency    Aortic insufficiency  mod/severe AI on echo 9/25/16  Asthma    Atrial fibrillation    COPD (chronic obstructive pulmonary disease)    Diabetes  type two. insulin dependent  Hypertension    Pelvic fracture    Tracheobronchomalacia

## 2017-05-11 NOTE — ED PROVIDER NOTE - OBJECTIVE STATEMENT
65 y/o F with h/o anorectal CA on chemo/radiation, a-fib on eliquis, COPD, adrenal insufficiency on steroids presents with abdominal pain and chest pain. Chest pain is on the left side, intermittent, last seconds. States she is at baseline SOB from COPD. Normal stress test 6 months ago. Abdominal pain is more bothersome and has been worse since yesterday. Exacerbated by a meal, epigastric with generalized radiation. Decreased appetite. Nausea and no vomiting. Last BM this morning, +flatulence. Has not had BM in 3 days prior to today. Sent in by Dr. Allyson meza to be evaluated for obstruction.

## 2017-05-11 NOTE — ED PROVIDER NOTE - PHYSICAL EXAMINATION
Attending Note: 65 y/o female presents with c/o abdominal and chest pain. PMH anorectal CA on chemo/radiation, A-fib on Eliquis, COPD, adrenal insufficiency on steroids. C/P on the left side, intermittent x seconds. +Baseline SOB 2/2 COPD. Normal stress x test 6 months ago. Abdominal pain more bothersome and been worse since yesterday. Exacerbated by a meal, epigastric with generalized radiation. Decreased appetite. +Nausea and no vomiting. Last BM this AM, +flatulence. N0 BM x 3 days prior to today. Sent in by Dr. Allyson meza to be evaluated for obstruction.

## 2017-05-11 NOTE — ED ADULT TRIAGE NOTE - CHIEF COMPLAINT QUOTE
Pt c/o of intermittent chest pain for the past two days. Pt currently on chemotherapy for Anal cancer.

## 2017-05-11 NOTE — ED PROVIDER NOTE - MEDICAL DECISION MAKING DETAILS
67 y/o F with anorectal CA on chemo with abdominal pain and constipation, r/o SBO with ct a/p. Will give patient pretreatment for contrast as she reports contrast allergy. Also complaining of chest pain, atypical, with normal stress test 6 months ago. Heart score 3-low risk. 2 sets of Jeffy and follow up with cardiologist

## 2017-05-11 NOTE — ED PROVIDER NOTE - CARE PLAN
Principal Discharge DX:	Abdominal pain  Instructions for follow-up, activity and diet:	- Follow up with your primary care doctor within 2-3 days, bring your results with you.  - For constipation we also recommend a diet high in fiber (beans, fruits, vegetables, whole grains).  If the diet does not work then please take Docusate (also called colace) 100mg 1-2 times a day as needed. This medication is available over the counter. Consider getting Magnesium Citrate, it is available over the counter. Drink half the bottle (150ml), if you do not have a bowel movement within 30 minutes then take the remaining half.   - Return to the Emergency Department for any new or worsening symptoms.  Secondary Diagnosis:	Constipation

## 2017-05-12 ENCOUNTER — APPOINTMENT (OUTPATIENT)
Dept: HEMATOLOGY ONCOLOGY | Facility: CLINIC | Age: 67
End: 2017-05-12

## 2017-05-12 ENCOUNTER — RESULT REVIEW (OUTPATIENT)
Age: 67
End: 2017-05-12

## 2017-05-12 LAB
BASOPHILS # BLD AUTO: 0 K/UL — SIGNIFICANT CHANGE UP (ref 0–0.2)
BASOPHILS NFR BLD AUTO: 0.2 % — SIGNIFICANT CHANGE UP (ref 0–2)
EOSINOPHIL # BLD AUTO: 0.1 K/UL — SIGNIFICANT CHANGE UP (ref 0–0.5)
EOSINOPHIL NFR BLD AUTO: 0.9 % — SIGNIFICANT CHANGE UP (ref 0–6)
HCT VFR BLD CALC: 31.2 % — LOW (ref 34.5–45)
HGB BLD-MCNC: 9.5 G/DL — LOW (ref 11.5–15.5)
LYMPHOCYTES # BLD AUTO: 0.6 K/UL — LOW (ref 1–3.3)
LYMPHOCYTES # BLD AUTO: 6.4 % — LOW (ref 13–44)
MCHC RBC-ENTMCNC: 21.4 PG — LOW (ref 27–34)
MCHC RBC-ENTMCNC: 30.5 G/DL — LOW (ref 32–36)
MCV RBC AUTO: 70.3 FL — LOW (ref 80–100)
MONOCYTES # BLD AUTO: 0.8 K/UL — SIGNIFICANT CHANGE UP (ref 0–0.9)
MONOCYTES NFR BLD AUTO: 8.3 % — SIGNIFICANT CHANGE UP (ref 2–14)
NEUTROPHILS # BLD AUTO: 7.6 K/UL — HIGH (ref 1.8–7.4)
NEUTROPHILS NFR BLD AUTO: 84.2 % — HIGH (ref 43–77)
PLATELET # BLD AUTO: 288 K/UL — SIGNIFICANT CHANGE UP (ref 150–400)
RBC # BLD: 4.44 M/UL — SIGNIFICANT CHANGE UP (ref 3.8–5.2)
RBC # FLD: 22.6 % — HIGH (ref 10.3–14.5)
WBC # BLD: 9.1 K/UL — SIGNIFICANT CHANGE UP (ref 3.8–10.5)
WBC # FLD AUTO: 9.1 K/UL — SIGNIFICANT CHANGE UP (ref 3.8–10.5)

## 2017-05-12 RX ADMIN — Medication 500 UNIT(S): at 01:43

## 2017-05-13 LAB
BACTERIA UR CULT: SIGNIFICANT CHANGE UP
SPECIMEN SOURCE: SIGNIFICANT CHANGE UP

## 2017-05-13 RX ORDER — CLINDAMYCIN HYDROCHLORIDE 150 MG/1
150 CAPSULE ORAL EVERY 8 HOURS
Qty: 1 | Refills: 0 | Status: DISCONTINUED | COMMUNITY
Start: 2017-04-02 | End: 2017-05-13

## 2017-05-15 VITALS
DIASTOLIC BLOOD PRESSURE: 71 MMHG | RESPIRATION RATE: 18 BRPM | SYSTOLIC BLOOD PRESSURE: 156 MMHG | OXYGEN SATURATION: 99 % | HEART RATE: 78 BPM | TEMPERATURE: 98.3 F

## 2017-05-15 VITALS — WEIGHT: 179.67 LBS | BODY MASS INDEX: 28.14 KG/M2

## 2017-05-16 ENCOUNTER — APPOINTMENT (OUTPATIENT)
Dept: PULMONOLOGY | Facility: CLINIC | Age: 67
End: 2017-05-16

## 2017-05-16 VITALS
HEART RATE: 76 BPM | OXYGEN SATURATION: 98 % | RESPIRATION RATE: 17 BRPM | SYSTOLIC BLOOD PRESSURE: 130 MMHG | HEIGHT: 67 IN | DIASTOLIC BLOOD PRESSURE: 70 MMHG | BODY MASS INDEX: 28.09 KG/M2 | WEIGHT: 179 LBS

## 2017-05-16 RX ORDER — OMALIZUMAB 202.5 MG/1.4ML
150 INJECTION, SOLUTION SUBCUTANEOUS
Qty: 45 | Refills: 0 | Status: COMPLETED | OUTPATIENT
Start: 2017-05-16

## 2017-05-16 RX ADMIN — OMALIZUMAB 0 MG: 202.5 INJECTION, SOLUTION SUBCUTANEOUS at 00:00

## 2017-05-18 ENCOUNTER — RESULT REVIEW (OUTPATIENT)
Age: 67
End: 2017-05-18

## 2017-05-18 ENCOUNTER — APPOINTMENT (OUTPATIENT)
Dept: HEMATOLOGY ONCOLOGY | Facility: CLINIC | Age: 67
End: 2017-05-18

## 2017-05-18 VITALS
SYSTOLIC BLOOD PRESSURE: 128 MMHG | TEMPERATURE: 98.4 F | OXYGEN SATURATION: 98 % | BODY MASS INDEX: 27.62 KG/M2 | WEIGHT: 176.37 LBS | RESPIRATION RATE: 16 BRPM | DIASTOLIC BLOOD PRESSURE: 66 MMHG | HEART RATE: 72 BPM

## 2017-05-18 LAB
BASOPHILS # BLD AUTO: 0 K/UL — SIGNIFICANT CHANGE UP (ref 0–0.2)
BASOPHILS NFR BLD AUTO: 0 % — SIGNIFICANT CHANGE UP (ref 0–2)
EOSINOPHIL # BLD AUTO: 0.1 K/UL — SIGNIFICANT CHANGE UP (ref 0–0.5)
EOSINOPHIL NFR BLD AUTO: 1.9 % — SIGNIFICANT CHANGE UP (ref 0–6)
HCT VFR BLD CALC: 32.6 % — LOW (ref 34.5–45)
HGB BLD-MCNC: 10.1 G/DL — LOW (ref 11.5–15.5)
LYMPHOCYTES # BLD AUTO: 0.5 K/UL — LOW (ref 1–3.3)
LYMPHOCYTES # BLD AUTO: 10.8 % — LOW (ref 13–44)
MCHC RBC-ENTMCNC: 22.1 PG — LOW (ref 27–34)
MCHC RBC-ENTMCNC: 31.1 G/DL — LOW (ref 32–36)
MCV RBC AUTO: 71.3 FL — LOW (ref 80–100)
MONOCYTES # BLD AUTO: 0.6 K/UL — SIGNIFICANT CHANGE UP (ref 0–0.9)
MONOCYTES NFR BLD AUTO: 11.9 % — SIGNIFICANT CHANGE UP (ref 2–14)
NEUTROPHILS # BLD AUTO: 3.6 K/UL — SIGNIFICANT CHANGE UP (ref 1.8–7.4)
NEUTROPHILS NFR BLD AUTO: 75.4 % — SIGNIFICANT CHANGE UP (ref 43–77)
PLATELET # BLD AUTO: 173 K/UL — SIGNIFICANT CHANGE UP (ref 150–400)
RBC # BLD: 4.58 M/UL — SIGNIFICANT CHANGE UP (ref 3.8–5.2)
RBC # FLD: 25.9 % — HIGH (ref 10.3–14.5)
WBC # BLD: 4.8 K/UL — SIGNIFICANT CHANGE UP (ref 3.8–10.5)
WBC # FLD AUTO: 4.8 K/UL — SIGNIFICANT CHANGE UP (ref 3.8–10.5)

## 2017-05-25 ENCOUNTER — RESULT REVIEW (OUTPATIENT)
Age: 67
End: 2017-05-25

## 2017-05-25 ENCOUNTER — APPOINTMENT (OUTPATIENT)
Dept: HEMATOLOGY ONCOLOGY | Facility: CLINIC | Age: 67
End: 2017-05-25

## 2017-05-25 LAB
BASOPHILS # BLD AUTO: 0 K/UL — SIGNIFICANT CHANGE UP (ref 0–0.2)
BASOPHILS NFR BLD AUTO: 0.1 % — SIGNIFICANT CHANGE UP (ref 0–2)
EOSINOPHIL # BLD AUTO: 0 K/UL — SIGNIFICANT CHANGE UP (ref 0–0.5)
EOSINOPHIL NFR BLD AUTO: 0.8 % — SIGNIFICANT CHANGE UP (ref 0–6)
HCT VFR BLD CALC: 36.1 % — SIGNIFICANT CHANGE UP (ref 34.5–45)
HGB BLD-MCNC: 10.8 G/DL — LOW (ref 11.5–15.5)
LYMPHOCYTES # BLD AUTO: 0.4 K/UL — LOW (ref 1–3.3)
LYMPHOCYTES # BLD AUTO: 6.9 % — LOW (ref 13–44)
MCHC RBC-ENTMCNC: 22 PG — LOW (ref 27–34)
MCHC RBC-ENTMCNC: 29.9 G/DL — LOW (ref 32–36)
MCV RBC AUTO: 73.7 FL — LOW (ref 80–100)
MONOCYTES # BLD AUTO: 0.6 K/UL — SIGNIFICANT CHANGE UP (ref 0–0.9)
MONOCYTES NFR BLD AUTO: 10 % — SIGNIFICANT CHANGE UP (ref 2–14)
NEUTROPHILS # BLD AUTO: 4.5 K/UL — SIGNIFICANT CHANGE UP (ref 1.8–7.4)
NEUTROPHILS NFR BLD AUTO: 82.2 % — HIGH (ref 43–77)
PLATELET # BLD AUTO: 172 K/UL — SIGNIFICANT CHANGE UP (ref 150–400)
RBC # BLD: 4.91 M/UL — SIGNIFICANT CHANGE UP (ref 3.8–5.2)
RBC # FLD: 27 % — HIGH (ref 10.3–14.5)
WBC # BLD: 5.5 K/UL — SIGNIFICANT CHANGE UP (ref 3.8–10.5)
WBC # FLD AUTO: 5.5 K/UL — SIGNIFICANT CHANGE UP (ref 3.8–10.5)

## 2017-05-30 ENCOUNTER — APPOINTMENT (OUTPATIENT)
Dept: PULMONOLOGY | Facility: CLINIC | Age: 67
End: 2017-05-30

## 2017-05-30 VITALS
OXYGEN SATURATION: 98 % | HEART RATE: 72 BPM | RESPIRATION RATE: 17 BRPM | SYSTOLIC BLOOD PRESSURE: 110 MMHG | DIASTOLIC BLOOD PRESSURE: 60 MMHG | BODY MASS INDEX: 27.62 KG/M2 | WEIGHT: 176 LBS | HEIGHT: 67 IN

## 2017-05-30 RX ORDER — OMALIZUMAB 202.5 MG/1.4ML
150 INJECTION, SOLUTION SUBCUTANEOUS
Qty: 45 | Refills: 0 | Status: COMPLETED | OUTPATIENT
Start: 2017-05-30

## 2017-05-30 RX ADMIN — OMALIZUMAB 0 MG: 202.5 INJECTION, SOLUTION SUBCUTANEOUS at 00:00

## 2017-05-31 ENCOUNTER — RESULT REVIEW (OUTPATIENT)
Age: 67
End: 2017-05-31

## 2017-05-31 ENCOUNTER — LABORATORY RESULT (OUTPATIENT)
Age: 67
End: 2017-05-31

## 2017-05-31 ENCOUNTER — OTHER (OUTPATIENT)
Age: 67
End: 2017-05-31

## 2017-05-31 ENCOUNTER — APPOINTMENT (OUTPATIENT)
Dept: INFUSION THERAPY | Facility: HOSPITAL | Age: 67
End: 2017-05-31

## 2017-05-31 LAB
BASOPHILS # BLD AUTO: 0 K/UL — SIGNIFICANT CHANGE UP (ref 0–0.2)
BASOPHILS NFR BLD AUTO: 0.5 % — SIGNIFICANT CHANGE UP (ref 0–2)
EOSINOPHIL # BLD AUTO: 0 K/UL — SIGNIFICANT CHANGE UP (ref 0–0.5)
EOSINOPHIL NFR BLD AUTO: 0 % — SIGNIFICANT CHANGE UP (ref 0–6)
HCT VFR BLD CALC: 37.1 % — SIGNIFICANT CHANGE UP (ref 34.5–45)
HGB BLD-MCNC: 11.7 G/DL — SIGNIFICANT CHANGE UP (ref 11.5–15.5)
LYMPHOCYTES # BLD AUTO: 0.4 K/UL — LOW (ref 1–3.3)
LYMPHOCYTES # BLD AUTO: 7 % — LOW (ref 13–44)
MCHC RBC-ENTMCNC: 23.5 PG — LOW (ref 27–34)
MCHC RBC-ENTMCNC: 31.6 G/DL — LOW (ref 32–36)
MCV RBC AUTO: 74.2 FL — LOW (ref 80–100)
MONOCYTES # BLD AUTO: 0.7 K/UL — SIGNIFICANT CHANGE UP (ref 0–0.9)
MONOCYTES NFR BLD AUTO: 10.4 % — SIGNIFICANT CHANGE UP (ref 2–14)
NEUTROPHILS # BLD AUTO: 5.2 K/UL — SIGNIFICANT CHANGE UP (ref 1.8–7.4)
NEUTROPHILS NFR BLD AUTO: 82.2 % — HIGH (ref 43–77)
PLATELET # BLD AUTO: 238 K/UL — SIGNIFICANT CHANGE UP (ref 150–400)
RBC # BLD: 5 M/UL — SIGNIFICANT CHANGE UP (ref 3.8–5.2)
RBC # FLD: 27.9 % — HIGH (ref 10.3–14.5)
WBC # BLD: 6.3 K/UL — SIGNIFICANT CHANGE UP (ref 3.8–10.5)
WBC # FLD AUTO: 6.3 K/UL — SIGNIFICANT CHANGE UP (ref 3.8–10.5)

## 2017-06-01 DIAGNOSIS — R11.2 NAUSEA WITH VOMITING, UNSPECIFIED: ICD-10-CM

## 2017-06-01 DIAGNOSIS — C21.1 MALIGNANT NEOPLASM OF ANAL CANAL: ICD-10-CM

## 2017-06-01 DIAGNOSIS — Z51.11 ENCOUNTER FOR ANTINEOPLASTIC CHEMOTHERAPY: ICD-10-CM

## 2017-06-02 ENCOUNTER — APPOINTMENT (OUTPATIENT)
Dept: HEMATOLOGY ONCOLOGY | Facility: CLINIC | Age: 67
End: 2017-06-02

## 2017-06-02 VITALS
RESPIRATION RATE: 16 BRPM | WEIGHT: 176.15 LBS | DIASTOLIC BLOOD PRESSURE: 64 MMHG | OXYGEN SATURATION: 98 % | HEART RATE: 56 BPM | SYSTOLIC BLOOD PRESSURE: 152 MMHG | TEMPERATURE: 97.9 F | BODY MASS INDEX: 27.59 KG/M2

## 2017-06-02 RX ORDER — SAW/PYGEUM/BETA/HERB/D3/B6/ZN 30 MG-25MG
CAPSULE ORAL
Refills: 0 | Status: DISCONTINUED | COMMUNITY
End: 2017-06-02

## 2017-06-02 RX ORDER — HYDROCORTISONE 25 MG/G
2.5 CREAM TOPICAL
Qty: 2 | Refills: 2 | Status: DISCONTINUED | COMMUNITY
Start: 2017-05-30 | End: 2017-06-02

## 2017-06-05 VITALS
BODY MASS INDEX: 27.9 KG/M2 | HEART RATE: 79 BPM | SYSTOLIC BLOOD PRESSURE: 158 MMHG | WEIGHT: 178.13 LBS | OXYGEN SATURATION: 99 % | DIASTOLIC BLOOD PRESSURE: 76 MMHG

## 2017-06-08 ENCOUNTER — APPOINTMENT (OUTPATIENT)
Dept: HEMATOLOGY ONCOLOGY | Facility: CLINIC | Age: 67
End: 2017-06-08

## 2017-06-08 ENCOUNTER — RESULT REVIEW (OUTPATIENT)
Age: 67
End: 2017-06-08

## 2017-06-08 LAB
BASOPHILS # BLD AUTO: 0.1 K/UL — SIGNIFICANT CHANGE UP (ref 0–0.2)
BASOPHILS NFR BLD AUTO: 1.9 % — SIGNIFICANT CHANGE UP (ref 0–2)
EOSINOPHIL # BLD AUTO: 0.1 K/UL — SIGNIFICANT CHANGE UP (ref 0–0.5)
EOSINOPHIL NFR BLD AUTO: 4.6 % — SIGNIFICANT CHANGE UP (ref 0–6)
HCT VFR BLD CALC: 35.7 % — SIGNIFICANT CHANGE UP (ref 34.5–45)
HGB BLD-MCNC: 11 G/DL — LOW (ref 11.5–15.5)
LYMPHOCYTES # BLD AUTO: 0.3 K/UL — LOW (ref 1–3.3)
LYMPHOCYTES # BLD AUTO: 9 % — LOW (ref 13–44)
MCHC RBC-ENTMCNC: 23 PG — LOW (ref 27–34)
MCHC RBC-ENTMCNC: 30.7 G/DL — LOW (ref 32–36)
MCV RBC AUTO: 75 FL — LOW (ref 80–100)
MONOCYTES # BLD AUTO: 0.4 K/UL — SIGNIFICANT CHANGE UP (ref 0–0.9)
MONOCYTES NFR BLD AUTO: 13.3 % — SIGNIFICANT CHANGE UP (ref 2–14)
NEUTROPHILS # BLD AUTO: 2.1 K/UL — SIGNIFICANT CHANGE UP (ref 1.8–7.4)
NEUTROPHILS NFR BLD AUTO: 71.2 % — SIGNIFICANT CHANGE UP (ref 43–77)
PLATELET # BLD AUTO: 226 K/UL — SIGNIFICANT CHANGE UP (ref 150–400)
RBC # BLD: 4.75 M/UL — SIGNIFICANT CHANGE UP (ref 3.8–5.2)
RBC # FLD: 27.4 % — HIGH (ref 10.3–14.5)
WBC # BLD: 3 K/UL — LOW (ref 3.8–10.5)
WBC # FLD AUTO: 3 K/UL — LOW (ref 3.8–10.5)

## 2017-06-12 VITALS
SYSTOLIC BLOOD PRESSURE: 150 MMHG | RESPIRATION RATE: 16 BRPM | HEART RATE: 72 BPM | DIASTOLIC BLOOD PRESSURE: 72 MMHG | TEMPERATURE: 97.2 F

## 2017-06-13 ENCOUNTER — MED ADMIN CHARGE (OUTPATIENT)
Age: 67
End: 2017-06-13

## 2017-06-13 ENCOUNTER — OUTPATIENT (OUTPATIENT)
Dept: OUTPATIENT SERVICES | Facility: HOSPITAL | Age: 67
LOS: 1 days | Discharge: HOME | End: 2017-06-13

## 2017-06-13 ENCOUNTER — APPOINTMENT (OUTPATIENT)
Dept: PULMONOLOGY | Facility: CLINIC | Age: 67
End: 2017-06-13

## 2017-06-13 VITALS
DIASTOLIC BLOOD PRESSURE: 77 MMHG | WEIGHT: 178 LBS | BODY MASS INDEX: 27.94 KG/M2 | SYSTOLIC BLOOD PRESSURE: 145 MMHG | RESPIRATION RATE: 97 BRPM | OXYGEN SATURATION: 97 % | HEART RATE: 79 BPM | HEIGHT: 67 IN

## 2017-06-13 DIAGNOSIS — H05.352 EXOSTOSIS OF LEFT ORBIT: Chronic | ICD-10-CM

## 2017-06-13 DIAGNOSIS — Z98.89 OTHER SPECIFIED POSTPROCEDURAL STATES: Chronic | ICD-10-CM

## 2017-06-13 DIAGNOSIS — C18.9 MALIGNANT NEOPLASM OF COLON, UNSPECIFIED: ICD-10-CM

## 2017-06-13 DIAGNOSIS — Z96.653 PRESENCE OF ARTIFICIAL KNEE JOINT, BILATERAL: Chronic | ICD-10-CM

## 2017-06-13 RX ORDER — OMALIZUMAB 202.5 MG/1.4ML
150 INJECTION, SOLUTION SUBCUTANEOUS
Qty: 45 | Refills: 0 | Status: COMPLETED | OUTPATIENT
Start: 2017-06-13

## 2017-06-13 RX ADMIN — OMALIZUMAB 0 MG: 202.5 INJECTION, SOLUTION SUBCUTANEOUS at 00:00

## 2017-06-15 ENCOUNTER — APPOINTMENT (OUTPATIENT)
Dept: HEMATOLOGY ONCOLOGY | Facility: CLINIC | Age: 67
End: 2017-06-15

## 2017-06-15 ENCOUNTER — RESULT REVIEW (OUTPATIENT)
Age: 67
End: 2017-06-15

## 2017-06-15 LAB
ANISOCYTOSIS BLD QL: SLIGHT — SIGNIFICANT CHANGE UP
BASOPHILS # BLD AUTO: 0 K/UL — SIGNIFICANT CHANGE UP (ref 0–0.2)
BASOPHILS NFR BLD AUTO: 2 % — SIGNIFICANT CHANGE UP (ref 0–2)
DACRYOCYTES BLD QL SMEAR: SLIGHT — SIGNIFICANT CHANGE UP
ELLIPTOCYTES BLD QL SMEAR: SLIGHT — SIGNIFICANT CHANGE UP
EOSINOPHIL # BLD AUTO: 0 K/UL — SIGNIFICANT CHANGE UP (ref 0–0.5)
HCT VFR BLD CALC: 35.3 % — SIGNIFICANT CHANGE UP (ref 34.5–45)
HGB BLD-MCNC: 11 G/DL — LOW (ref 11.5–15.5)
HYPOCHROMIA BLD QL: SIGNIFICANT CHANGE UP
LYMPHOCYTES # BLD AUTO: 0.3 K/UL — LOW (ref 1–3.3)
LYMPHOCYTES # BLD AUTO: 8 % — LOW (ref 13–44)
MACROCYTES BLD QL: SLIGHT — SIGNIFICANT CHANGE UP
MCHC RBC-ENTMCNC: 23.3 PG — LOW (ref 27–34)
MCHC RBC-ENTMCNC: 31.1 G/DL — LOW (ref 32–36)
MCV RBC AUTO: 74.9 FL — LOW (ref 80–100)
MICROCYTES BLD QL: SLIGHT — SIGNIFICANT CHANGE UP
MONOCYTES # BLD AUTO: 0.7 K/UL — SIGNIFICANT CHANGE UP (ref 0–0.9)
MONOCYTES NFR BLD AUTO: 15 % — HIGH (ref 2–14)
NEUTROPHILS # BLD AUTO: 2.3 K/UL — SIGNIFICANT CHANGE UP (ref 1.8–7.4)
NEUTROPHILS NFR BLD AUTO: 75 % — SIGNIFICANT CHANGE UP (ref 43–77)
PLAT MORPH BLD: NORMAL — SIGNIFICANT CHANGE UP
PLATELET # BLD AUTO: 157 K/UL — SIGNIFICANT CHANGE UP (ref 150–400)
POIKILOCYTOSIS BLD QL AUTO: SLIGHT — SIGNIFICANT CHANGE UP
POLYCHROMASIA BLD QL SMEAR: SLIGHT — SIGNIFICANT CHANGE UP
RBC # BLD: 4.71 M/UL — SIGNIFICANT CHANGE UP (ref 3.8–5.2)
RBC # FLD: 27.7 % — HIGH (ref 10.3–14.5)
RBC BLD AUTO: ABNORMAL
SCHISTOCYTES BLD QL AUTO: SLIGHT — SIGNIFICANT CHANGE UP
WBC # BLD: 3.3 K/UL — LOW (ref 3.8–10.5)
WBC # FLD AUTO: 3.3 K/UL — LOW (ref 3.8–10.5)

## 2017-06-22 ENCOUNTER — APPOINTMENT (OUTPATIENT)
Dept: HEMATOLOGY ONCOLOGY | Facility: CLINIC | Age: 67
End: 2017-06-22

## 2017-06-22 ENCOUNTER — RESULT REVIEW (OUTPATIENT)
Age: 67
End: 2017-06-22

## 2017-06-22 VITALS
DIASTOLIC BLOOD PRESSURE: 72 MMHG | SYSTOLIC BLOOD PRESSURE: 126 MMHG | TEMPERATURE: 98.5 F | BODY MASS INDEX: 27.86 KG/M2 | OXYGEN SATURATION: 96 % | WEIGHT: 177.91 LBS | RESPIRATION RATE: 16 BRPM | HEART RATE: 75 BPM

## 2017-06-22 LAB
BASOPHILS # BLD AUTO: 0 K/UL — SIGNIFICANT CHANGE UP (ref 0–0.2)
BASOPHILS NFR BLD AUTO: 0 % — SIGNIFICANT CHANGE UP (ref 0–2)
EOSINOPHIL # BLD AUTO: 0 K/UL — SIGNIFICANT CHANGE UP (ref 0–0.5)
EOSINOPHIL NFR BLD AUTO: 0.8 % — SIGNIFICANT CHANGE UP (ref 0–6)
HCT VFR BLD CALC: 35 % — SIGNIFICANT CHANGE UP (ref 34.5–45)
HGB BLD-MCNC: 10.9 G/DL — LOW (ref 11.5–15.5)
LYMPHOCYTES # BLD AUTO: 0.4 K/UL — LOW (ref 1–3.3)
LYMPHOCYTES # BLD AUTO: 6.6 % — LOW (ref 13–44)
MCHC RBC-ENTMCNC: 23.4 PG — LOW (ref 27–34)
MCHC RBC-ENTMCNC: 31.1 G/DL — LOW (ref 32–36)
MCV RBC AUTO: 75.3 FL — LOW (ref 80–100)
MONOCYTES # BLD AUTO: 0.7 K/UL — SIGNIFICANT CHANGE UP (ref 0–0.9)
MONOCYTES NFR BLD AUTO: 12 % — SIGNIFICANT CHANGE UP (ref 2–14)
NEUTROPHILS # BLD AUTO: 4.7 K/UL — SIGNIFICANT CHANGE UP (ref 1.8–7.4)
NEUTROPHILS NFR BLD AUTO: 80.5 % — HIGH (ref 43–77)
PLATELET # BLD AUTO: 117 K/UL — LOW (ref 150–400)
RBC # BLD: 4.65 M/UL — SIGNIFICANT CHANGE UP (ref 3.8–5.2)
RBC # FLD: 27.8 % — HIGH (ref 10.3–14.5)
WBC # BLD: 5.9 K/UL — SIGNIFICANT CHANGE UP (ref 3.8–10.5)
WBC # FLD AUTO: 5.9 K/UL — SIGNIFICANT CHANGE UP (ref 3.8–10.5)

## 2017-06-22 RX ORDER — CAPECITABINE 500 MG/1
500 TABLET ORAL
Qty: 40 | Refills: 0 | Status: DISCONTINUED | COMMUNITY
Start: 2017-04-25 | End: 2017-06-16

## 2017-06-22 RX ORDER — CEPHALEXIN 500 MG/1
500 TABLET ORAL 3 TIMES DAILY
Qty: 15 | Refills: 0 | Status: DISCONTINUED | COMMUNITY
Start: 2017-06-22 | End: 2017-06-22

## 2017-06-22 RX ORDER — HYDROCORTISONE ACETATE 25 MG/1
25 SUPPOSITORY RECTAL
Qty: 30 | Refills: 0 | Status: DISCONTINUED | COMMUNITY
Start: 2017-06-05 | End: 2017-06-22

## 2017-06-24 LAB
ALBUMIN SERPL ELPH-MCNC: 3.8 G/DL
ALP BLD-CCNC: 84 U/L
ALT SERPL-CCNC: 14 U/L
ANION GAP SERPL CALC-SCNC: 12 MMOL/L
AST SERPL-CCNC: 32 U/L
BILIRUB SERPL-MCNC: 0.2 MG/DL
BUN SERPL-MCNC: 19 MG/DL
CALCIUM SERPL-MCNC: 8.8 MG/DL
CHLORIDE SERPL-SCNC: 107 MMOL/L
CO2 SERPL-SCNC: 20 MMOL/L
CREAT SERPL-MCNC: 0.75 MG/DL
GLUCOSE SERPL-MCNC: 239 MG/DL
HBA1C MFR BLD HPLC: 7.5 %
HGB A MFR BLD: 97.7 %
HGB A2 MFR BLD: 2.3 %
HGB FRACT BLD-IMP: NORMAL
POTASSIUM SERPL-SCNC: 5.2 MMOL/L
PROT SERPL-MCNC: 6.4 G/DL
SODIUM SERPL-SCNC: 139 MMOL/L

## 2017-06-26 LAB — BACTERIA SPT CULT: NORMAL

## 2017-06-30 ENCOUNTER — RESULT REVIEW (OUTPATIENT)
Age: 67
End: 2017-06-30

## 2017-06-30 ENCOUNTER — APPOINTMENT (OUTPATIENT)
Dept: HEMATOLOGY ONCOLOGY | Facility: CLINIC | Age: 67
End: 2017-06-30

## 2017-06-30 VITALS
HEART RATE: 75 BPM | BODY MASS INDEX: 27.07 KG/M2 | TEMPERATURE: 98.9 F | WEIGHT: 172.84 LBS | RESPIRATION RATE: 16 BRPM | OXYGEN SATURATION: 96 % | DIASTOLIC BLOOD PRESSURE: 66 MMHG | SYSTOLIC BLOOD PRESSURE: 132 MMHG

## 2017-06-30 LAB
BASOPHILS # BLD AUTO: 0 K/UL — SIGNIFICANT CHANGE UP (ref 0–0.2)
BASOPHILS NFR BLD AUTO: 0 % — SIGNIFICANT CHANGE UP (ref 0–2)
EOSINOPHIL # BLD AUTO: 0.2 K/UL — SIGNIFICANT CHANGE UP (ref 0–0.5)
EOSINOPHIL NFR BLD AUTO: 3.4 % — SIGNIFICANT CHANGE UP (ref 0–6)
HCT VFR BLD CALC: 37.4 % — SIGNIFICANT CHANGE UP (ref 34.5–45)
HGB BLD-MCNC: 12.1 G/DL — SIGNIFICANT CHANGE UP (ref 11.5–15.5)
LYMPHOCYTES # BLD AUTO: 0.6 K/UL — LOW (ref 1–3.3)
LYMPHOCYTES # BLD AUTO: 10.2 % — LOW (ref 13–44)
MCHC RBC-ENTMCNC: 24.5 PG — LOW (ref 27–34)
MCHC RBC-ENTMCNC: 32.2 G/DL — SIGNIFICANT CHANGE UP (ref 32–36)
MCV RBC AUTO: 75.9 FL — LOW (ref 80–100)
MONOCYTES # BLD AUTO: 0.7 K/UL — SIGNIFICANT CHANGE UP (ref 0–0.9)
MONOCYTES NFR BLD AUTO: 12.9 % — SIGNIFICANT CHANGE UP (ref 2–14)
NEUTROPHILS # BLD AUTO: 4.2 K/UL — SIGNIFICANT CHANGE UP (ref 1.8–7.4)
NEUTROPHILS NFR BLD AUTO: 73.5 % — SIGNIFICANT CHANGE UP (ref 43–77)
PLATELET # BLD AUTO: 153 K/UL — SIGNIFICANT CHANGE UP (ref 150–400)
RBC # BLD: 4.93 M/UL — SIGNIFICANT CHANGE UP (ref 3.8–5.2)
RBC # FLD: 27.7 % — HIGH (ref 10.3–14.5)
WBC # BLD: 5.7 K/UL — SIGNIFICANT CHANGE UP (ref 3.8–10.5)
WBC # FLD AUTO: 5.7 K/UL — SIGNIFICANT CHANGE UP (ref 3.8–10.5)

## 2017-07-05 ENCOUNTER — APPOINTMENT (OUTPATIENT)
Dept: PULMONOLOGY | Facility: CLINIC | Age: 67
End: 2017-07-05

## 2017-07-05 VITALS
HEART RATE: 72 BPM | OXYGEN SATURATION: 95 % | WEIGHT: 172 LBS | SYSTOLIC BLOOD PRESSURE: 110 MMHG | HEIGHT: 67 IN | BODY MASS INDEX: 27 KG/M2 | RESPIRATION RATE: 17 BRPM | DIASTOLIC BLOOD PRESSURE: 70 MMHG

## 2017-07-05 RX ORDER — OMALIZUMAB 202.5 MG/1.4ML
150 INJECTION, SOLUTION SUBCUTANEOUS
Qty: 45 | Refills: 0 | Status: COMPLETED | OUTPATIENT
Start: 2017-07-05

## 2017-07-05 RX ADMIN — OMALIZUMAB 0 MG: 202.5 INJECTION, SOLUTION SUBCUTANEOUS at 00:00

## 2017-07-06 ENCOUNTER — APPOINTMENT (OUTPATIENT)
Dept: HEMATOLOGY ONCOLOGY | Facility: CLINIC | Age: 67
End: 2017-07-06

## 2017-07-06 ENCOUNTER — MEDICATION RENEWAL (OUTPATIENT)
Age: 67
End: 2017-07-06

## 2017-07-12 ENCOUNTER — LABORATORY RESULT (OUTPATIENT)
Age: 67
End: 2017-07-12

## 2017-07-12 ENCOUNTER — RESULT REVIEW (OUTPATIENT)
Age: 67
End: 2017-07-12

## 2017-07-12 ENCOUNTER — APPOINTMENT (OUTPATIENT)
Dept: INFUSION THERAPY | Facility: HOSPITAL | Age: 67
End: 2017-07-12

## 2017-07-12 LAB
ANISOCYTOSIS BLD QL: SLIGHT — SIGNIFICANT CHANGE UP
BASOPHILS # BLD AUTO: 0 K/UL — SIGNIFICANT CHANGE UP (ref 0–0.2)
DACRYOCYTES BLD QL SMEAR: SLIGHT — SIGNIFICANT CHANGE UP
ELLIPTOCYTES BLD QL SMEAR: SLIGHT — SIGNIFICANT CHANGE UP
EOSINOPHIL # BLD AUTO: 0.1 K/UL — SIGNIFICANT CHANGE UP (ref 0–0.5)
HCT VFR BLD CALC: 36.4 % — SIGNIFICANT CHANGE UP (ref 34.5–45)
HGB BLD-MCNC: 11.3 G/DL — LOW (ref 11.5–15.5)
HYPOCHROMIA BLD QL: SIGNIFICANT CHANGE UP
LYMPHOCYTES # BLD AUTO: 0.5 K/UL — LOW (ref 1–3.3)
LYMPHOCYTES # BLD AUTO: 8 % — LOW (ref 13–44)
MCHC RBC-ENTMCNC: 24.3 PG — LOW (ref 27–34)
MCHC RBC-ENTMCNC: 31.1 G/DL — LOW (ref 32–36)
MCV RBC AUTO: 78 FL — LOW (ref 80–100)
MICROCYTES BLD QL: SLIGHT — SIGNIFICANT CHANGE UP
MONOCYTES # BLD AUTO: 1 K/UL — HIGH (ref 0–0.9)
MONOCYTES NFR BLD AUTO: 23 % — HIGH (ref 2–14)
NEUTROPHILS # BLD AUTO: 2.8 K/UL — SIGNIFICANT CHANGE UP (ref 1.8–7.4)
NEUTROPHILS NFR BLD AUTO: 69 % — SIGNIFICANT CHANGE UP (ref 43–77)
PLAT MORPH BLD: NORMAL — SIGNIFICANT CHANGE UP
PLATELET # BLD AUTO: 226 K/UL — SIGNIFICANT CHANGE UP (ref 150–400)
POIKILOCYTOSIS BLD QL AUTO: SLIGHT — SIGNIFICANT CHANGE UP
POLYCHROMASIA BLD QL SMEAR: SLIGHT — SIGNIFICANT CHANGE UP
RBC # BLD: 4.67 M/UL — SIGNIFICANT CHANGE UP (ref 3.8–5.2)
RBC # FLD: 27.9 % — HIGH (ref 10.3–14.5)
RBC BLD AUTO: ABNORMAL
WBC # BLD: 4.4 K/UL — SIGNIFICANT CHANGE UP (ref 3.8–10.5)
WBC # FLD AUTO: 4.4 K/UL — SIGNIFICANT CHANGE UP (ref 3.8–10.5)

## 2017-07-13 ENCOUNTER — APPOINTMENT (OUTPATIENT)
Dept: DERMATOLOGY | Facility: CLINIC | Age: 67
End: 2017-07-13

## 2017-07-13 VITALS
SYSTOLIC BLOOD PRESSURE: 152 MMHG | WEIGHT: 172 LBS | HEIGHT: 67 IN | DIASTOLIC BLOOD PRESSURE: 72 MMHG | BODY MASS INDEX: 27 KG/M2

## 2017-07-14 ENCOUNTER — MEDICATION RENEWAL (OUTPATIENT)
Age: 67
End: 2017-07-14

## 2017-07-18 ENCOUNTER — APPOINTMENT (OUTPATIENT)
Dept: RADIATION ONCOLOGY | Facility: CLINIC | Age: 67
End: 2017-07-18

## 2017-07-19 ENCOUNTER — OUTPATIENT (OUTPATIENT)
Dept: OUTPATIENT SERVICES | Facility: HOSPITAL | Age: 67
LOS: 1 days | Discharge: HOME | End: 2017-07-19

## 2017-07-19 ENCOUNTER — APPOINTMENT (OUTPATIENT)
Dept: HEMATOLOGY ONCOLOGY | Facility: CLINIC | Age: 67
End: 2017-07-19

## 2017-07-19 ENCOUNTER — APPOINTMENT (OUTPATIENT)
Dept: PULMONOLOGY | Facility: CLINIC | Age: 67
End: 2017-07-19

## 2017-07-19 VITALS
HEIGHT: 67 IN | OXYGEN SATURATION: 97 % | BODY MASS INDEX: 27 KG/M2 | DIASTOLIC BLOOD PRESSURE: 80 MMHG | HEART RATE: 91 BPM | RESPIRATION RATE: 17 BRPM | SYSTOLIC BLOOD PRESSURE: 135 MMHG | WEIGHT: 172 LBS

## 2017-07-19 DIAGNOSIS — Z98.89 OTHER SPECIFIED POSTPROCEDURAL STATES: Chronic | ICD-10-CM

## 2017-07-19 DIAGNOSIS — H05.352 EXOSTOSIS OF LEFT ORBIT: Chronic | ICD-10-CM

## 2017-07-19 DIAGNOSIS — C18.9 MALIGNANT NEOPLASM OF COLON, UNSPECIFIED: ICD-10-CM

## 2017-07-19 DIAGNOSIS — Z96.653 PRESENCE OF ARTIFICIAL KNEE JOINT, BILATERAL: Chronic | ICD-10-CM

## 2017-07-19 RX ORDER — OMALIZUMAB 202.5 MG/1.4ML
150 INJECTION, SOLUTION SUBCUTANEOUS
Qty: 45 | Refills: 0 | Status: COMPLETED | OUTPATIENT
Start: 2017-07-19

## 2017-07-19 RX ADMIN — OMALIZUMAB 0 MG: 202.5 INJECTION, SOLUTION SUBCUTANEOUS at 00:00

## 2017-07-24 LAB — BACTERIA SPT CF RESP CULT: ABNORMAL

## 2017-07-25 ENCOUNTER — APPOINTMENT (OUTPATIENT)
Dept: RADIATION ONCOLOGY | Facility: CLINIC | Age: 67
End: 2017-07-25

## 2017-07-25 VITALS
OXYGEN SATURATION: 92 % | RESPIRATION RATE: 15 BRPM | BODY MASS INDEX: 26.88 KG/M2 | DIASTOLIC BLOOD PRESSURE: 74 MMHG | HEART RATE: 78 BPM | SYSTOLIC BLOOD PRESSURE: 124 MMHG | WEIGHT: 171.63 LBS

## 2017-07-27 ENCOUNTER — FORM ENCOUNTER (OUTPATIENT)
Age: 67
End: 2017-07-27

## 2017-07-28 ENCOUNTER — RESULT REVIEW (OUTPATIENT)
Age: 67
End: 2017-07-28

## 2017-07-28 ENCOUNTER — CHART COPY (OUTPATIENT)
Age: 67
End: 2017-07-28

## 2017-07-28 ENCOUNTER — APPOINTMENT (OUTPATIENT)
Dept: RADIOLOGY | Facility: IMAGING CENTER | Age: 67
End: 2017-07-28
Payer: MEDICARE

## 2017-07-28 ENCOUNTER — APPOINTMENT (OUTPATIENT)
Dept: HEMATOLOGY ONCOLOGY | Facility: CLINIC | Age: 67
End: 2017-07-28
Payer: MEDICARE

## 2017-07-28 ENCOUNTER — OUTPATIENT (OUTPATIENT)
Dept: OUTPATIENT SERVICES | Facility: HOSPITAL | Age: 67
LOS: 1 days | End: 2017-07-28
Payer: MEDICARE

## 2017-07-28 VITALS
HEART RATE: 81 BPM | BODY MASS INDEX: 26.93 KG/M2 | WEIGHT: 171.96 LBS | OXYGEN SATURATION: 96 % | DIASTOLIC BLOOD PRESSURE: 76 MMHG | SYSTOLIC BLOOD PRESSURE: 148 MMHG | RESPIRATION RATE: 16 BRPM | TEMPERATURE: 98.2 F

## 2017-07-28 DIAGNOSIS — Z96.653 PRESENCE OF ARTIFICIAL KNEE JOINT, BILATERAL: Chronic | ICD-10-CM

## 2017-07-28 DIAGNOSIS — H05.352 EXOSTOSIS OF LEFT ORBIT: Chronic | ICD-10-CM

## 2017-07-28 DIAGNOSIS — R05 COUGH: ICD-10-CM

## 2017-07-28 DIAGNOSIS — Z98.89 OTHER SPECIFIED POSTPROCEDURAL STATES: Chronic | ICD-10-CM

## 2017-07-28 LAB
BASOPHILS # BLD AUTO: 0 K/UL — SIGNIFICANT CHANGE UP (ref 0–0.2)
BASOPHILS NFR BLD AUTO: 0.1 % — SIGNIFICANT CHANGE UP (ref 0–2)
EOSINOPHIL # BLD AUTO: 0.2 K/UL — SIGNIFICANT CHANGE UP (ref 0–0.5)
EOSINOPHIL NFR BLD AUTO: 1.8 % — SIGNIFICANT CHANGE UP (ref 0–6)
HCT VFR BLD CALC: 38 % — SIGNIFICANT CHANGE UP (ref 34.5–45)
HGB BLD-MCNC: 12.1 G/DL — SIGNIFICANT CHANGE UP (ref 11.5–15.5)
LYMPHOCYTES # BLD AUTO: 0.9 K/UL — LOW (ref 1–3.3)
LYMPHOCYTES # BLD AUTO: 11.1 % — LOW (ref 13–44)
MCHC RBC-ENTMCNC: 24.7 PG — LOW (ref 27–34)
MCHC RBC-ENTMCNC: 31.7 G/DL — LOW (ref 32–36)
MCV RBC AUTO: 77.9 FL — LOW (ref 80–100)
MONOCYTES # BLD AUTO: 1.1 K/UL — HIGH (ref 0–0.9)
MONOCYTES NFR BLD AUTO: 13.8 % — SIGNIFICANT CHANGE UP (ref 2–14)
NEUTROPHILS # BLD AUTO: 6.1 K/UL — SIGNIFICANT CHANGE UP (ref 1.8–7.4)
NEUTROPHILS NFR BLD AUTO: 73.3 % — SIGNIFICANT CHANGE UP (ref 43–77)
PLATELET # BLD AUTO: 238 K/UL — SIGNIFICANT CHANGE UP (ref 150–400)
RBC # BLD: 4.88 M/UL — SIGNIFICANT CHANGE UP (ref 3.8–5.2)
RBC # FLD: 24.2 % — HIGH (ref 10.3–14.5)
WBC # BLD: 8.3 K/UL — SIGNIFICANT CHANGE UP (ref 3.8–10.5)
WBC # FLD AUTO: 8.3 K/UL — SIGNIFICANT CHANGE UP (ref 3.8–10.5)

## 2017-07-28 PROCEDURE — 71020: CPT | Mod: 26

## 2017-07-28 PROCEDURE — 99214 OFFICE O/P EST MOD 30 MIN: CPT

## 2017-07-28 PROCEDURE — 71046 X-RAY EXAM CHEST 2 VIEWS: CPT

## 2017-08-03 ENCOUNTER — APPOINTMENT (OUTPATIENT)
Dept: PULMONOLOGY | Facility: CLINIC | Age: 67
End: 2017-08-03
Payer: MEDICARE

## 2017-08-03 VITALS
DIASTOLIC BLOOD PRESSURE: 62 MMHG | BODY MASS INDEX: 26.84 KG/M2 | OXYGEN SATURATION: 98 % | HEART RATE: 64 BPM | WEIGHT: 171 LBS | SYSTOLIC BLOOD PRESSURE: 134 MMHG | RESPIRATION RATE: 17 BRPM | HEIGHT: 67 IN

## 2017-08-03 PROCEDURE — 99214 OFFICE O/P EST MOD 30 MIN: CPT | Mod: 25

## 2017-08-03 PROCEDURE — 96372 THER/PROPH/DIAG INJ SC/IM: CPT

## 2017-08-03 PROCEDURE — 94010 BREATHING CAPACITY TEST: CPT

## 2017-08-03 RX ORDER — OMALIZUMAB 202.5 MG/1.4ML
150 INJECTION, SOLUTION SUBCUTANEOUS
Qty: 45 | Refills: 0 | Status: COMPLETED | OUTPATIENT
Start: 2017-08-03

## 2017-08-03 RX ADMIN — OMALIZUMAB 0 MG: 202.5 INJECTION, SOLUTION SUBCUTANEOUS at 00:00

## 2017-08-06 ENCOUNTER — FORM ENCOUNTER (OUTPATIENT)
Age: 67
End: 2017-08-06

## 2017-08-07 ENCOUNTER — APPOINTMENT (OUTPATIENT)
Dept: MRI IMAGING | Facility: IMAGING CENTER | Age: 67
End: 2017-08-07

## 2017-08-07 ENCOUNTER — APPOINTMENT (OUTPATIENT)
Dept: ULTRASOUND IMAGING | Facility: IMAGING CENTER | Age: 67
End: 2017-08-07
Payer: MEDICARE

## 2017-08-07 ENCOUNTER — APPOINTMENT (OUTPATIENT)
Dept: MAMMOGRAPHY | Facility: IMAGING CENTER | Age: 67
End: 2017-08-07
Payer: MEDICARE

## 2017-08-07 ENCOUNTER — OUTPATIENT (OUTPATIENT)
Dept: OUTPATIENT SERVICES | Facility: HOSPITAL | Age: 67
LOS: 1 days | End: 2017-08-07
Payer: MEDICARE

## 2017-08-07 DIAGNOSIS — H05.352 EXOSTOSIS OF LEFT ORBIT: Chronic | ICD-10-CM

## 2017-08-07 DIAGNOSIS — Z96.653 PRESENCE OF ARTIFICIAL KNEE JOINT, BILATERAL: Chronic | ICD-10-CM

## 2017-08-07 DIAGNOSIS — Z98.89 OTHER SPECIFIED POSTPROCEDURAL STATES: Chronic | ICD-10-CM

## 2017-08-07 DIAGNOSIS — C21.1 MALIGNANT NEOPLASM OF ANAL CANAL: ICD-10-CM

## 2017-08-07 PROCEDURE — G0204: CPT | Mod: 26,GG

## 2017-08-07 PROCEDURE — A9585: CPT

## 2017-08-07 PROCEDURE — 82565 ASSAY OF CREATININE: CPT

## 2017-08-07 PROCEDURE — 76641 ULTRASOUND BREAST COMPLETE: CPT | Mod: 26,50

## 2017-08-07 PROCEDURE — G0202: CPT | Mod: 26,59

## 2017-08-07 PROCEDURE — 77067 SCR MAMMO BI INCL CAD: CPT

## 2017-08-07 PROCEDURE — G0279: CPT

## 2017-08-07 PROCEDURE — 72197 MRI PELVIS W/O & W/DYE: CPT | Mod: 26

## 2017-08-07 PROCEDURE — 76641 ULTRASOUND BREAST COMPLETE: CPT

## 2017-08-07 PROCEDURE — 77066 DX MAMMO INCL CAD BI: CPT

## 2017-08-07 PROCEDURE — 77063 BREAST TOMOSYNTHESIS BI: CPT

## 2017-08-07 PROCEDURE — 77063 BREAST TOMOSYNTHESIS BI: CPT | Mod: 26

## 2017-08-07 PROCEDURE — 72197 MRI PELVIS W/O & W/DYE: CPT

## 2017-08-10 DIAGNOSIS — R92.2 INCONCLUSIVE MAMMOGRAM: ICD-10-CM

## 2017-08-10 DIAGNOSIS — N63 UNSPECIFIED LUMP IN BREAST: ICD-10-CM

## 2017-08-10 DIAGNOSIS — Z12.31 ENCOUNTER FOR SCREENING MAMMOGRAM FOR MALIGNANT NEOPLASM OF BREAST: ICD-10-CM

## 2017-08-10 DIAGNOSIS — Z80.3 FAMILY HISTORY OF MALIGNANT NEOPLASM OF BREAST: ICD-10-CM

## 2017-08-10 DIAGNOSIS — Z85.3 PERSONAL HISTORY OF MALIGNANT NEOPLASM OF BREAST: ICD-10-CM

## 2017-08-13 ENCOUNTER — FORM ENCOUNTER (OUTPATIENT)
Age: 67
End: 2017-08-13

## 2017-08-14 ENCOUNTER — RESULT REVIEW (OUTPATIENT)
Age: 67
End: 2017-08-14

## 2017-08-14 ENCOUNTER — OUTPATIENT (OUTPATIENT)
Dept: OUTPATIENT SERVICES | Facility: HOSPITAL | Age: 67
LOS: 1 days | End: 2017-08-14
Payer: MEDICARE

## 2017-08-14 ENCOUNTER — APPOINTMENT (OUTPATIENT)
Dept: ULTRASOUND IMAGING | Facility: IMAGING CENTER | Age: 67
End: 2017-08-14
Payer: MEDICARE

## 2017-08-14 DIAGNOSIS — Z98.89 OTHER SPECIFIED POSTPROCEDURAL STATES: Chronic | ICD-10-CM

## 2017-08-14 DIAGNOSIS — N63 UNSPECIFIED LUMP IN BREAST: ICD-10-CM

## 2017-08-14 DIAGNOSIS — H05.352 EXOSTOSIS OF LEFT ORBIT: Chronic | ICD-10-CM

## 2017-08-14 DIAGNOSIS — Z96.653 PRESENCE OF ARTIFICIAL KNEE JOINT, BILATERAL: Chronic | ICD-10-CM

## 2017-08-14 PROCEDURE — 19084 BX BREAST ADD LESION US IMAG: CPT

## 2017-08-14 PROCEDURE — 19084 BX BREAST ADD LESION US IMAG: CPT | Mod: 26,RT

## 2017-08-14 PROCEDURE — A4648: CPT

## 2017-08-14 PROCEDURE — 88305 TISSUE EXAM BY PATHOLOGIST: CPT | Mod: 26

## 2017-08-14 PROCEDURE — 19083 BX BREAST 1ST LESION US IMAG: CPT | Mod: LT

## 2017-08-14 PROCEDURE — G0204: CPT | Mod: 26

## 2017-08-14 PROCEDURE — 19083 BX BREAST 1ST LESION US IMAG: CPT

## 2017-08-14 PROCEDURE — 77066 DX MAMMO INCL CAD BI: CPT

## 2017-08-14 PROCEDURE — 88305 TISSUE EXAM BY PATHOLOGIST: CPT

## 2017-08-15 ENCOUNTER — FORM ENCOUNTER (OUTPATIENT)
Age: 67
End: 2017-08-15

## 2017-08-16 ENCOUNTER — APPOINTMENT (OUTPATIENT)
Dept: PULMONOLOGY | Facility: CLINIC | Age: 67
End: 2017-08-16
Payer: MEDICARE

## 2017-08-16 ENCOUNTER — RX RENEWAL (OUTPATIENT)
Age: 67
End: 2017-08-16

## 2017-08-16 ENCOUNTER — APPOINTMENT (OUTPATIENT)
Dept: CT IMAGING | Facility: IMAGING CENTER | Age: 67
End: 2017-08-16
Payer: MEDICARE

## 2017-08-16 ENCOUNTER — OUTPATIENT (OUTPATIENT)
Dept: OUTPATIENT SERVICES | Facility: HOSPITAL | Age: 67
LOS: 1 days | End: 2017-08-16
Payer: MEDICARE

## 2017-08-16 VITALS
SYSTOLIC BLOOD PRESSURE: 115 MMHG | HEART RATE: 70 BPM | HEIGHT: 67 IN | RESPIRATION RATE: 16 BRPM | DIASTOLIC BLOOD PRESSURE: 80 MMHG | WEIGHT: 171 LBS | BODY MASS INDEX: 26.84 KG/M2 | OXYGEN SATURATION: 98 %

## 2017-08-16 DIAGNOSIS — C44.520 SQUAMOUS CELL CARCINOMA OF ANAL SKIN: ICD-10-CM

## 2017-08-16 DIAGNOSIS — Z98.89 OTHER SPECIFIED POSTPROCEDURAL STATES: Chronic | ICD-10-CM

## 2017-08-16 DIAGNOSIS — H05.352 EXOSTOSIS OF LEFT ORBIT: Chronic | ICD-10-CM

## 2017-08-16 DIAGNOSIS — Z96.653 PRESENCE OF ARTIFICIAL KNEE JOINT, BILATERAL: Chronic | ICD-10-CM

## 2017-08-16 DIAGNOSIS — R05 COUGH: ICD-10-CM

## 2017-08-16 DIAGNOSIS — J39.8 OTHER SPECIFIED DISEASES OF UPPER RESPIRATORY TRACT: ICD-10-CM

## 2017-08-16 PROCEDURE — 96372 THER/PROPH/DIAG INJ SC/IM: CPT

## 2017-08-16 PROCEDURE — 71250 CT THORAX DX C-: CPT

## 2017-08-16 PROCEDURE — 71250 CT THORAX DX C-: CPT | Mod: 26

## 2017-08-16 RX ORDER — OMALIZUMAB 202.5 MG/1.4ML
150 INJECTION, SOLUTION SUBCUTANEOUS
Qty: 45 | Refills: 0 | Status: COMPLETED | OUTPATIENT
Start: 2017-08-16

## 2017-08-16 RX ADMIN — OMALIZUMAB 0 MG: 202.5 INJECTION, SOLUTION SUBCUTANEOUS at 00:00

## 2017-08-17 ENCOUNTER — APPOINTMENT (OUTPATIENT)
Dept: THORACIC SURGERY | Facility: CLINIC | Age: 67
End: 2017-08-17
Payer: MEDICARE

## 2017-08-17 ENCOUNTER — APPOINTMENT (OUTPATIENT)
Dept: THORACIC SURGERY | Facility: CLINIC | Age: 67
End: 2017-08-17

## 2017-08-17 VITALS
OXYGEN SATURATION: 95 % | HEART RATE: 71 BPM | RESPIRATION RATE: 18 BRPM | WEIGHT: 168 LBS | TEMPERATURE: 98.1 F | SYSTOLIC BLOOD PRESSURE: 139 MMHG | BODY MASS INDEX: 26.31 KG/M2 | DIASTOLIC BLOOD PRESSURE: 63 MMHG

## 2017-08-17 DIAGNOSIS — R09.3 ABNORMAL SPUTUM: ICD-10-CM

## 2017-08-17 DIAGNOSIS — L02.92 FURUNCLE, UNSPECIFIED: ICD-10-CM

## 2017-08-17 PROCEDURE — 99214 OFFICE O/P EST MOD 30 MIN: CPT

## 2017-08-18 ENCOUNTER — RESULT REVIEW (OUTPATIENT)
Age: 67
End: 2017-08-18

## 2017-08-23 ENCOUNTER — OTHER (OUTPATIENT)
Age: 67
End: 2017-08-23

## 2017-08-23 ENCOUNTER — OUTPATIENT (OUTPATIENT)
Dept: OUTPATIENT SERVICES | Facility: HOSPITAL | Age: 67
LOS: 1 days | Discharge: HOME | End: 2017-08-23

## 2017-08-23 ENCOUNTER — APPOINTMENT (OUTPATIENT)
Dept: INFUSION THERAPY | Facility: HOSPITAL | Age: 67
End: 2017-08-23

## 2017-08-23 DIAGNOSIS — Z98.89 OTHER SPECIFIED POSTPROCEDURAL STATES: Chronic | ICD-10-CM

## 2017-08-23 DIAGNOSIS — H05.352 EXOSTOSIS OF LEFT ORBIT: Chronic | ICD-10-CM

## 2017-08-23 DIAGNOSIS — C18.9 MALIGNANT NEOPLASM OF COLON, UNSPECIFIED: ICD-10-CM

## 2017-08-23 DIAGNOSIS — Z96.653 PRESENCE OF ARTIFICIAL KNEE JOINT, BILATERAL: Chronic | ICD-10-CM

## 2017-08-24 ENCOUNTER — APPOINTMENT (OUTPATIENT)
Dept: THORACIC SURGERY | Facility: CLINIC | Age: 67
End: 2017-08-24
Payer: MEDICARE

## 2017-08-24 ENCOUNTER — OUTPATIENT (OUTPATIENT)
Dept: OUTPATIENT SERVICES | Facility: HOSPITAL | Age: 67
LOS: 1 days | End: 2017-08-24
Payer: MEDICARE

## 2017-08-24 VITALS
RESPIRATION RATE: 18 BRPM | HEART RATE: 75 BPM | SYSTOLIC BLOOD PRESSURE: 140 MMHG | TEMPERATURE: 97.4 F | OXYGEN SATURATION: 98 % | DIASTOLIC BLOOD PRESSURE: 65 MMHG

## 2017-08-24 DIAGNOSIS — R05 COUGH: ICD-10-CM

## 2017-08-24 DIAGNOSIS — Z98.89 OTHER SPECIFIED POSTPROCEDURAL STATES: Chronic | ICD-10-CM

## 2017-08-24 DIAGNOSIS — H05.352 EXOSTOSIS OF LEFT ORBIT: Chronic | ICD-10-CM

## 2017-08-24 DIAGNOSIS — Z96.653 PRESENCE OF ARTIFICIAL KNEE JOINT, BILATERAL: Chronic | ICD-10-CM

## 2017-08-24 LAB
ALBUMIN SERPL ELPH-MCNC: 3.9 G/DL — SIGNIFICANT CHANGE UP (ref 3.3–5)
ALP SERPL-CCNC: 91 U/L — SIGNIFICANT CHANGE UP (ref 40–120)
ALT FLD-CCNC: 11 U/L — SIGNIFICANT CHANGE UP (ref 10–45)
ANION GAP SERPL CALC-SCNC: 14 MMOL/L — SIGNIFICANT CHANGE UP (ref 5–17)
APPEARANCE UR: CLEAR — SIGNIFICANT CHANGE UP
APTT BLD: 36.4 SEC — SIGNIFICANT CHANGE UP (ref 27.5–37.4)
AST SERPL-CCNC: 13 U/L — SIGNIFICANT CHANGE UP (ref 10–40)
BACTERIA # UR AUTO: PRESENT /HPF
BASOPHILS NFR BLD AUTO: 0.1 % — SIGNIFICANT CHANGE UP (ref 0–2)
BILIRUB SERPL-MCNC: 0.2 MG/DL — SIGNIFICANT CHANGE UP (ref 0.2–1.2)
BILIRUB UR-MCNC: NEGATIVE — SIGNIFICANT CHANGE UP
BLD GP AB SCN SERPL QL: NEGATIVE — SIGNIFICANT CHANGE UP
BUN SERPL-MCNC: 17 MG/DL — SIGNIFICANT CHANGE UP (ref 7–23)
CALCIUM SERPL-MCNC: 9.2 MG/DL — SIGNIFICANT CHANGE UP (ref 8.4–10.5)
CHLORIDE SERPL-SCNC: 101 MMOL/L — SIGNIFICANT CHANGE UP (ref 96–108)
CHOLEST SERPL-MCNC: 217 MG/DL — HIGH (ref 10–199)
CO2 SERPL-SCNC: 26 MMOL/L — SIGNIFICANT CHANGE UP (ref 22–31)
COD CRY URNS QL: (no result) /HPF
COLOR SPEC: YELLOW — SIGNIFICANT CHANGE UP
CREAT SERPL-MCNC: 0.6 MG/DL — SIGNIFICANT CHANGE UP (ref 0.5–1.3)
DIFF PNL FLD: NEGATIVE — SIGNIFICANT CHANGE UP
EOSINOPHIL NFR BLD AUTO: 1.8 % — SIGNIFICANT CHANGE UP (ref 0–6)
EPI CELLS # UR: SIGNIFICANT CHANGE UP /HPF
GLUCOSE SERPL-MCNC: 157 MG/DL — HIGH (ref 70–99)
GLUCOSE UR QL: NEGATIVE — SIGNIFICANT CHANGE UP
HBA1C BLD-MCNC: 7.9 % — HIGH (ref 4–5.6)
HBV SURFACE AG SER-ACNC: SIGNIFICANT CHANGE UP
HCT VFR BLD CALC: 37.6 % — SIGNIFICANT CHANGE UP (ref 34.5–45)
HDLC SERPL-MCNC: 59 MG/DL — SIGNIFICANT CHANGE UP (ref 40–125)
HGB BLD-MCNC: 11.6 G/DL — SIGNIFICANT CHANGE UP (ref 11.5–15.5)
HYALINE CASTS # UR AUTO: (no result) /LPF
INR BLD: 1.11 — SIGNIFICANT CHANGE UP (ref 0.88–1.16)
KETONES UR-MCNC: NEGATIVE — SIGNIFICANT CHANGE UP
LEUKOCYTE ESTERASE UR-ACNC: (no result)
LIPID PNL WITH DIRECT LDL SERPL: 122 MG/DL — SIGNIFICANT CHANGE UP
LYMPHOCYTES # BLD AUTO: 9 % — LOW (ref 13–44)
MCHC RBC-ENTMCNC: 24 PG — LOW (ref 27–34)
MCHC RBC-ENTMCNC: 30.9 G/DL — LOW (ref 32–36)
MCV RBC AUTO: 77.7 FL — LOW (ref 80–100)
MONOCYTES NFR BLD AUTO: 10.8 % — SIGNIFICANT CHANGE UP (ref 2–14)
NEUTROPHILS NFR BLD AUTO: 78.3 % — HIGH (ref 43–77)
NITRITE UR-MCNC: NEGATIVE — SIGNIFICANT CHANGE UP
NT-PROBNP SERPL-SCNC: 52 PG/ML — SIGNIFICANT CHANGE UP (ref 0–300)
PH UR: 5.5 — SIGNIFICANT CHANGE UP (ref 5–8)
PLATELET # BLD AUTO: 187 K/UL — SIGNIFICANT CHANGE UP (ref 150–400)
POTASSIUM SERPL-MCNC: 4.4 MMOL/L — SIGNIFICANT CHANGE UP (ref 3.5–5.3)
POTASSIUM SERPL-SCNC: 4.4 MMOL/L — SIGNIFICANT CHANGE UP (ref 3.5–5.3)
PROT SERPL-MCNC: 7.1 G/DL — SIGNIFICANT CHANGE UP (ref 6–8.3)
PROT UR-MCNC: (no result) MG/DL
PROTHROM AB SERPL-ACNC: 12.4 SEC — SIGNIFICANT CHANGE UP (ref 9.8–12.7)
RBC # BLD: 4.84 M/UL — SIGNIFICANT CHANGE UP (ref 3.8–5.2)
RBC # FLD: 18.4 % — HIGH (ref 10.3–16.9)
RBC CASTS # UR COMP ASSIST: < 5 /HPF — SIGNIFICANT CHANGE UP
RH IG SCN BLD-IMP: POSITIVE — SIGNIFICANT CHANGE UP
SODIUM SERPL-SCNC: 141 MMOL/L — SIGNIFICANT CHANGE UP (ref 135–145)
SP GR SPEC: >=1.03 — SIGNIFICANT CHANGE UP (ref 1–1.03)
TOTAL CHOLESTEROL/HDL RATIO MEASUREMENT: 3.7 RATIO — SIGNIFICANT CHANGE UP (ref 3.3–7.1)
TRIGL SERPL-MCNC: 182 MG/DL — HIGH (ref 10–149)
TSH SERPL-MCNC: 1.38 UIU/ML — SIGNIFICANT CHANGE UP (ref 0.35–4.94)
UROBILINOGEN FLD QL: 0.2 E.U./DL — SIGNIFICANT CHANGE UP
WBC # BLD: 8.4 K/UL — SIGNIFICANT CHANGE UP (ref 3.8–10.5)
WBC # FLD AUTO: 8.4 K/UL — SIGNIFICANT CHANGE UP (ref 3.8–10.5)
WBC UR QL: < 5 /HPF — SIGNIFICANT CHANGE UP

## 2017-08-24 PROCEDURE — 87070 CULTURE OTHR SPECIMN AEROBIC: CPT

## 2017-08-24 PROCEDURE — 83880 ASSAY OF NATRIURETIC PEPTIDE: CPT

## 2017-08-24 PROCEDURE — 85025 COMPLETE CBC W/AUTO DIFF WBC: CPT

## 2017-08-24 PROCEDURE — 86901 BLOOD TYPING SEROLOGIC RH(D): CPT

## 2017-08-24 PROCEDURE — 85730 THROMBOPLASTIN TIME PARTIAL: CPT

## 2017-08-24 PROCEDURE — 81001 URINALYSIS AUTO W/SCOPE: CPT

## 2017-08-24 PROCEDURE — 86900 BLOOD TYPING SEROLOGIC ABO: CPT

## 2017-08-24 PROCEDURE — 85610 PROTHROMBIN TIME: CPT

## 2017-08-24 PROCEDURE — 87184 SC STD DISK METHOD PER PLATE: CPT

## 2017-08-24 PROCEDURE — 87186 SC STD MICRODIL/AGAR DIL: CPT

## 2017-08-24 PROCEDURE — 87340 HEPATITIS B SURFACE AG IA: CPT

## 2017-08-24 PROCEDURE — 80061 LIPID PANEL: CPT

## 2017-08-24 PROCEDURE — 80053 COMPREHEN METABOLIC PANEL: CPT

## 2017-08-24 PROCEDURE — 93005 ELECTROCARDIOGRAM TRACING: CPT

## 2017-08-24 PROCEDURE — 83036 HEMOGLOBIN GLYCOSYLATED A1C: CPT

## 2017-08-24 PROCEDURE — 86850 RBC ANTIBODY SCREEN: CPT

## 2017-08-24 PROCEDURE — 93010 ELECTROCARDIOGRAM REPORT: CPT

## 2017-08-24 PROCEDURE — 99214 OFFICE O/P EST MOD 30 MIN: CPT

## 2017-08-24 PROCEDURE — 84443 ASSAY THYROID STIM HORMONE: CPT

## 2017-08-25 LAB
GRAM STN FLD: SIGNIFICANT CHANGE UP
SPECIMEN SOURCE: SIGNIFICANT CHANGE UP

## 2017-08-27 LAB
-  AMPICILLIN/SULBACTAM: SIGNIFICANT CHANGE UP
-  AMPICILLIN: SIGNIFICANT CHANGE UP
-  CEFAZOLIN: SIGNIFICANT CHANGE UP
-  CEFTRIAXONE: SIGNIFICANT CHANGE UP
-  CIPROFLOXACIN: SIGNIFICANT CHANGE UP
-  GENTAMICIN: SIGNIFICANT CHANGE UP
-  PIPERACILLIN/TAZOBACTAM: SIGNIFICANT CHANGE UP
-  TOBRAMYCIN: SIGNIFICANT CHANGE UP
-  TRIMETHOPRIM/SULFAMETHOXAZOLE: SIGNIFICANT CHANGE UP
METHOD TYPE: SIGNIFICANT CHANGE UP

## 2017-08-28 LAB
-  AMOXICILLIN/CLAVULANIC ACID: SIGNIFICANT CHANGE UP
-  AMPICILLIN: SIGNIFICANT CHANGE UP
-  AZITHROMYCIN: SIGNIFICANT CHANGE UP
-  CEFTRIAXONE: SIGNIFICANT CHANGE UP
-  ERYTHROMYCIN: SIGNIFICANT CHANGE UP
-  LEVOFLOXACIN: SIGNIFICANT CHANGE UP
-  PENICILLIN: SIGNIFICANT CHANGE UP
-  TETRACYCLINE: SIGNIFICANT CHANGE UP
-  TRIMETHOPRIM/SULFAMETHOXAZOLE: SIGNIFICANT CHANGE UP
METHOD TYPE: SIGNIFICANT CHANGE UP

## 2017-08-29 ENCOUNTER — APPOINTMENT (OUTPATIENT)
Dept: HEMATOLOGY ONCOLOGY | Facility: CLINIC | Age: 67
End: 2017-08-29
Payer: MEDICARE

## 2017-08-29 VITALS
OXYGEN SATURATION: 97 % | BODY MASS INDEX: 26.52 KG/M2 | WEIGHT: 169.32 LBS | DIASTOLIC BLOOD PRESSURE: 74 MMHG | RESPIRATION RATE: 16 BRPM | SYSTOLIC BLOOD PRESSURE: 146 MMHG | HEART RATE: 76 BPM | TEMPERATURE: 98.1 F

## 2017-08-29 LAB
CULTURE RESULTS: SIGNIFICANT CHANGE UP
SPECIMEN SOURCE: SIGNIFICANT CHANGE UP

## 2017-08-29 PROCEDURE — 99215 OFFICE O/P EST HI 40 MIN: CPT

## 2017-08-29 RX ORDER — ONDANSETRON 4 MG/1
4 TABLET, ORALLY DISINTEGRATING ORAL 3 TIMES DAILY
Qty: 20 | Refills: 3 | Status: DISCONTINUED | COMMUNITY
Start: 2017-05-02 | End: 2017-08-29

## 2017-08-29 RX ORDER — PHENAZOPYRIDINE 100 MG/1
100 TABLET, FILM COATED ORAL 3 TIMES DAILY
Qty: 90 | Refills: 1 | Status: DISCONTINUED | COMMUNITY
Start: 2017-06-05 | End: 2017-08-29

## 2017-08-29 RX ORDER — LORATADINE 5 MG
17 TABLET,CHEWABLE ORAL
Refills: 0 | Status: DISCONTINUED | COMMUNITY
End: 2017-08-29

## 2017-08-29 RX ORDER — CIPROFLOXACIN HYDROCHLORIDE 500 MG/1
500 TABLET, FILM COATED ORAL
Qty: 14 | Refills: 0 | Status: DISCONTINUED | COMMUNITY
Start: 2017-07-24 | End: 2017-08-29

## 2017-08-29 RX ORDER — MULTIVIT-MIN/IRON/FOLIC ACID/K 18-600-40
CAPSULE ORAL
Refills: 0 | Status: DISCONTINUED | COMMUNITY
End: 2017-08-29

## 2017-08-29 RX ORDER — FERROUS SULFATE 325(65) MG
325 (65 FE) TABLET ORAL
Refills: 0 | Status: DISCONTINUED | COMMUNITY
Start: 2017-05-13 | End: 2017-08-29

## 2017-08-29 RX ORDER — CLINDAMYCIN PHOSPHATE 10 MG/ML
1 LOTION TOPICAL TWICE DAILY
Qty: 1 | Refills: 3 | Status: DISCONTINUED | COMMUNITY
Start: 2017-07-13 | End: 2017-08-29

## 2017-08-29 RX ORDER — HYDROCORTISONE 25 MG/G
2.5 CREAM TOPICAL DAILY
Qty: 30 | Refills: 1 | Status: DISCONTINUED | COMMUNITY
Start: 2017-06-12 | End: 2017-08-29

## 2017-08-29 RX ORDER — DOCUSATE SODIUM 100 MG/1
100 CAPSULE, LIQUID FILLED ORAL TWICE DAILY
Refills: 0 | Status: DISCONTINUED | COMMUNITY
Start: 2017-05-13 | End: 2017-08-29

## 2017-08-29 RX ORDER — CLINDAMYCIN PHOSPHATE 1 G/10ML
1 GEL TOPICAL TWICE DAILY
Qty: 1 | Refills: 2 | Status: DISCONTINUED | COMMUNITY
Start: 2017-07-14 | End: 2017-08-29

## 2017-08-29 RX ORDER — TURM/GING/BOS/YUC/WIL/CHAM/HOR 100-100 MG
TABLET ORAL
Refills: 0 | Status: DISCONTINUED | COMMUNITY
End: 2017-08-29

## 2017-08-29 RX ORDER — DOXYCYCLINE HYCLATE 100 MG/1
100 CAPSULE ORAL
Qty: 14 | Refills: 1 | Status: DISCONTINUED | COMMUNITY
Start: 2017-06-22 | End: 2017-08-29

## 2017-08-29 RX ORDER — PHENAZOPYRIDINE 200 MG/1
200 TABLET, FILM COATED ORAL 3 TIMES DAILY
Qty: 45 | Refills: 0 | Status: DISCONTINUED | COMMUNITY
Start: 2017-05-30 | End: 2017-08-29

## 2017-08-29 RX ORDER — PRAMOXINE HYDROCHLORIDE HYDROCORTISONE ACETATE 100; 100 MG/10G; MG/10G
1-1 AEROSOL, FOAM TOPICAL
Qty: 2 | Refills: 5 | Status: DISCONTINUED | COMMUNITY
Start: 2017-05-22 | End: 2017-08-29

## 2017-08-30 ENCOUNTER — APPOINTMENT (OUTPATIENT)
Dept: PULMONOLOGY | Facility: CLINIC | Age: 67
End: 2017-08-30
Payer: MEDICARE

## 2017-08-30 ENCOUNTER — APPOINTMENT (OUTPATIENT)
Dept: PULMONOLOGY | Facility: CLINIC | Age: 67
End: 2017-08-30

## 2017-08-30 VITALS
HEIGHT: 67 IN | SYSTOLIC BLOOD PRESSURE: 125 MMHG | RESPIRATION RATE: 15 BRPM | DIASTOLIC BLOOD PRESSURE: 65 MMHG | WEIGHT: 169 LBS | OXYGEN SATURATION: 98 % | BODY MASS INDEX: 26.53 KG/M2 | HEART RATE: 78 BPM

## 2017-08-30 PROCEDURE — 94620 PULMONARY STRESS TESTING SIMPLE: CPT

## 2017-08-30 PROCEDURE — 99214 OFFICE O/P EST MOD 30 MIN: CPT | Mod: 25

## 2017-08-30 PROCEDURE — 96372 THER/PROPH/DIAG INJ SC/IM: CPT

## 2017-08-30 RX ORDER — OMALIZUMAB 202.5 MG/1.4ML
150 INJECTION, SOLUTION SUBCUTANEOUS
Qty: 60 | Refills: 0 | Status: COMPLETED | OUTPATIENT
Start: 2017-08-30

## 2017-08-30 RX ADMIN — OMALIZUMAB 0 MG: 202.5 INJECTION, SOLUTION SUBCUTANEOUS at 00:00

## 2017-09-07 ENCOUNTER — APPOINTMENT (OUTPATIENT)
Dept: DERMATOLOGY | Facility: CLINIC | Age: 67
End: 2017-09-07

## 2017-09-08 ENCOUNTER — APPOINTMENT (OUTPATIENT)
Dept: CV DIAGNOSITCS | Facility: HOSPITAL | Age: 67
End: 2017-09-08

## 2017-09-08 ENCOUNTER — OUTPATIENT (OUTPATIENT)
Dept: OUTPATIENT SERVICES | Facility: HOSPITAL | Age: 67
LOS: 1 days | End: 2017-09-08
Payer: MEDICARE

## 2017-09-08 DIAGNOSIS — Z98.89 OTHER SPECIFIED POSTPROCEDURAL STATES: Chronic | ICD-10-CM

## 2017-09-08 DIAGNOSIS — Z96.653 PRESENCE OF ARTIFICIAL KNEE JOINT, BILATERAL: Chronic | ICD-10-CM

## 2017-09-08 DIAGNOSIS — H05.352 EXOSTOSIS OF LEFT ORBIT: Chronic | ICD-10-CM

## 2017-09-08 DIAGNOSIS — I35.1 NONRHEUMATIC AORTIC (VALVE) INSUFFICIENCY: ICD-10-CM

## 2017-09-08 PROCEDURE — 93312 ECHO TRANSESOPHAGEAL: CPT

## 2017-09-08 PROCEDURE — 93312 ECHO TRANSESOPHAGEAL: CPT | Mod: 26

## 2017-09-08 PROCEDURE — 93306 TTE W/DOPPLER COMPLETE: CPT

## 2017-09-08 PROCEDURE — 93306 TTE W/DOPPLER COMPLETE: CPT | Mod: 26

## 2017-09-09 LAB
-  AMIKACIN: SIGNIFICANT CHANGE UP
-  AZTREONAM: SIGNIFICANT CHANGE UP
-  CEFEPIME: SIGNIFICANT CHANGE UP
-  CEFTAZIDIME: SIGNIFICANT CHANGE UP
-  CEFTRIAXONE: SIGNIFICANT CHANGE UP
-  CIPROFLOXACIN: SIGNIFICANT CHANGE UP
-  GENTAMICIN: SIGNIFICANT CHANGE UP
-  IMIPENEM: SIGNIFICANT CHANGE UP
-  LEVOFLOXACIN: SIGNIFICANT CHANGE UP
-  MEROPENEM: SIGNIFICANT CHANGE UP
-  PIPERACILLIN/TAZOBACTAM: SIGNIFICANT CHANGE UP
-  TOBRAMYCIN: SIGNIFICANT CHANGE UP
-  TRIMETHOPRIM/SULFAMETHOXAZOLE: SIGNIFICANT CHANGE UP
METHOD TYPE: SIGNIFICANT CHANGE UP
ORGANISM # SPEC MICROSCOPIC CNT: SIGNIFICANT CHANGE UP

## 2017-09-11 ENCOUNTER — APPOINTMENT (OUTPATIENT)
Dept: CARDIOLOGY | Facility: CLINIC | Age: 67
End: 2017-09-11
Payer: MEDICARE

## 2017-09-11 ENCOUNTER — NON-APPOINTMENT (OUTPATIENT)
Age: 67
End: 2017-09-11

## 2017-09-11 VITALS — SYSTOLIC BLOOD PRESSURE: 145 MMHG | OXYGEN SATURATION: 100 % | DIASTOLIC BLOOD PRESSURE: 77 MMHG | HEART RATE: 66 BPM

## 2017-09-11 PROCEDURE — 99214 OFFICE O/P EST MOD 30 MIN: CPT

## 2017-09-11 PROCEDURE — 93000 ELECTROCARDIOGRAM COMPLETE: CPT

## 2017-09-11 RX ORDER — SULFAMETHOXAZOLE AND TRIMETHOPRIM 800; 160 MG/1; MG/1
800-160 TABLET ORAL TWICE DAILY
Qty: 20 | Refills: 0 | Status: DISCONTINUED | COMMUNITY
Start: 2017-08-29 | End: 2017-09-11

## 2017-09-13 ENCOUNTER — APPOINTMENT (OUTPATIENT)
Dept: PULMONOLOGY | Facility: CLINIC | Age: 67
End: 2017-09-13
Payer: MEDICARE

## 2017-09-13 VITALS — HEART RATE: 72 BPM | OXYGEN SATURATION: 96 % | DIASTOLIC BLOOD PRESSURE: 80 MMHG | SYSTOLIC BLOOD PRESSURE: 126 MMHG

## 2017-09-13 PROCEDURE — 94060 EVALUATION OF WHEEZING: CPT

## 2017-09-13 PROCEDURE — ZZZZZ: CPT

## 2017-09-13 PROCEDURE — 94727 GAS DIL/WSHOT DETER LNG VOL: CPT

## 2017-09-13 PROCEDURE — 96372 THER/PROPH/DIAG INJ SC/IM: CPT | Mod: 59

## 2017-09-13 PROCEDURE — 94729 DIFFUSING CAPACITY: CPT

## 2017-09-13 RX ORDER — OMALIZUMAB 202.5 MG/1.4ML
150 INJECTION, SOLUTION SUBCUTANEOUS
Qty: 45 | Refills: 0 | Status: COMPLETED | OUTPATIENT
Start: 2017-09-13

## 2017-09-13 RX ADMIN — OMALIZUMAB 0 MG: 202.5 INJECTION, SOLUTION SUBCUTANEOUS at 00:00

## 2017-09-14 ENCOUNTER — MEDICATION RENEWAL (OUTPATIENT)
Age: 67
End: 2017-09-14

## 2017-09-18 ENCOUNTER — EMERGENCY (EMERGENCY)
Facility: HOSPITAL | Age: 67
LOS: 1 days | Discharge: HOME | End: 2017-09-18
Attending: EMERGENCY MEDICINE | Admitting: EMERGENCY MEDICINE
Payer: MEDICARE

## 2017-09-18 ENCOUNTER — FORM ENCOUNTER (OUTPATIENT)
Age: 67
End: 2017-09-18

## 2017-09-18 VITALS
DIASTOLIC BLOOD PRESSURE: 82 MMHG | SYSTOLIC BLOOD PRESSURE: 154 MMHG | OXYGEN SATURATION: 98 % | RESPIRATION RATE: 17 BRPM | HEART RATE: 78 BPM | TEMPERATURE: 98 F

## 2017-09-18 VITALS
HEART RATE: 70 BPM | OXYGEN SATURATION: 96 % | TEMPERATURE: 97 F | WEIGHT: 167.99 LBS | DIASTOLIC BLOOD PRESSURE: 65 MMHG | SYSTOLIC BLOOD PRESSURE: 144 MMHG | RESPIRATION RATE: 16 BRPM | HEIGHT: 67 IN

## 2017-09-18 VITALS
DIASTOLIC BLOOD PRESSURE: 87 MMHG | OXYGEN SATURATION: 99 % | HEART RATE: 72 BPM | SYSTOLIC BLOOD PRESSURE: 156 MMHG | RESPIRATION RATE: 19 BRPM | TEMPERATURE: 98 F

## 2017-09-18 DIAGNOSIS — H05.352 EXOSTOSIS OF LEFT ORBIT: Chronic | ICD-10-CM

## 2017-09-18 DIAGNOSIS — Z98.89 OTHER SPECIFIED POSTPROCEDURAL STATES: Chronic | ICD-10-CM

## 2017-09-18 DIAGNOSIS — Z96.653 PRESENCE OF ARTIFICIAL KNEE JOINT, BILATERAL: Chronic | ICD-10-CM

## 2017-09-18 LAB
ALBUMIN SERPL ELPH-MCNC: 3.9 G/DL — SIGNIFICANT CHANGE UP (ref 3.3–5)
ALP SERPL-CCNC: 83 U/L — SIGNIFICANT CHANGE UP (ref 40–120)
ALT FLD-CCNC: 11 U/L RC — SIGNIFICANT CHANGE UP (ref 10–45)
ANION GAP SERPL CALC-SCNC: 13 MMOL/L — SIGNIFICANT CHANGE UP (ref 5–17)
APPEARANCE UR: CLEAR — SIGNIFICANT CHANGE UP
AST SERPL-CCNC: 14 U/L — SIGNIFICANT CHANGE UP (ref 10–40)
BASOPHILS # BLD AUTO: 0 K/UL — SIGNIFICANT CHANGE UP (ref 0–0.2)
BASOPHILS NFR BLD AUTO: 0.1 % — SIGNIFICANT CHANGE UP (ref 0–2)
BILIRUB SERPL-MCNC: 0.3 MG/DL — SIGNIFICANT CHANGE UP (ref 0.2–1.2)
BILIRUB UR-MCNC: NEGATIVE — SIGNIFICANT CHANGE UP
BUN SERPL-MCNC: 13 MG/DL — SIGNIFICANT CHANGE UP (ref 7–23)
CALCIUM SERPL-MCNC: 8.9 MG/DL — SIGNIFICANT CHANGE UP (ref 8.4–10.5)
CHLORIDE SERPL-SCNC: 106 MMOL/L — SIGNIFICANT CHANGE UP (ref 96–108)
CK MB BLD-MCNC: 2.1 % — SIGNIFICANT CHANGE UP (ref 0–3.5)
CK MB CFR SERPL CALC: 2.4 NG/ML — SIGNIFICANT CHANGE UP (ref 0–3.8)
CK SERPL-CCNC: 113 U/L — SIGNIFICANT CHANGE UP (ref 25–170)
CO2 SERPL-SCNC: 26 MMOL/L — SIGNIFICANT CHANGE UP (ref 22–31)
COLOR SPEC: COLORLESS — SIGNIFICANT CHANGE UP
CREAT SERPL-MCNC: 0.65 MG/DL — SIGNIFICANT CHANGE UP (ref 0.5–1.3)
DIFF PNL FLD: NEGATIVE — SIGNIFICANT CHANGE UP
EOSINOPHIL # BLD AUTO: 0.2 K/UL — SIGNIFICANT CHANGE UP (ref 0–0.5)
EOSINOPHIL NFR BLD AUTO: 3.6 % — SIGNIFICANT CHANGE UP (ref 0–6)
GLUCOSE SERPL-MCNC: 118 MG/DL — HIGH (ref 70–99)
GLUCOSE UR QL: NEGATIVE — SIGNIFICANT CHANGE UP
HCT VFR BLD CALC: 39.6 % — SIGNIFICANT CHANGE UP (ref 34.5–45)
HGB BLD-MCNC: 12.5 G/DL — SIGNIFICANT CHANGE UP (ref 11.5–15.5)
KETONES UR-MCNC: NEGATIVE — SIGNIFICANT CHANGE UP
LACTATE SERPL-SCNC: 1.4 MMOL/L — SIGNIFICANT CHANGE UP (ref 0.7–2)
LEUKOCYTE ESTERASE UR-ACNC: NEGATIVE — SIGNIFICANT CHANGE UP
LYMPHOCYTES # BLD AUTO: 1 K/UL — SIGNIFICANT CHANGE UP (ref 1–3.3)
LYMPHOCYTES # BLD AUTO: 13.6 % — SIGNIFICANT CHANGE UP (ref 13–44)
MCHC RBC-ENTMCNC: 26.5 PG — LOW (ref 27–34)
MCHC RBC-ENTMCNC: 31.7 GM/DL — LOW (ref 32–36)
MCV RBC AUTO: 83.5 FL — SIGNIFICANT CHANGE UP (ref 80–100)
MONOCYTES # BLD AUTO: 1.1 K/UL — HIGH (ref 0–0.9)
MONOCYTES NFR BLD AUTO: 15.6 % — HIGH (ref 2–14)
NEUTROPHILS # BLD AUTO: 4.7 K/UL — SIGNIFICANT CHANGE UP (ref 1.8–7.4)
NEUTROPHILS NFR BLD AUTO: 67.2 % — SIGNIFICANT CHANGE UP (ref 43–77)
NITRITE UR-MCNC: NEGATIVE — SIGNIFICANT CHANGE UP
PH UR: 5.5 — SIGNIFICANT CHANGE UP (ref 5–8)
PLATELET # BLD AUTO: 227 K/UL — SIGNIFICANT CHANGE UP (ref 150–400)
POTASSIUM SERPL-MCNC: 3.9 MMOL/L — SIGNIFICANT CHANGE UP (ref 3.5–5.3)
POTASSIUM SERPL-SCNC: 3.9 MMOL/L — SIGNIFICANT CHANGE UP (ref 3.5–5.3)
PROT SERPL-MCNC: 6.7 G/DL — SIGNIFICANT CHANGE UP (ref 6–8.3)
PROT UR-MCNC: NEGATIVE — SIGNIFICANT CHANGE UP
RBC # BLD: 4.74 M/UL — SIGNIFICANT CHANGE UP (ref 3.8–5.2)
RBC # FLD: 13.8 % — SIGNIFICANT CHANGE UP (ref 10.3–14.5)
SODIUM SERPL-SCNC: 145 MMOL/L — SIGNIFICANT CHANGE UP (ref 135–145)
SP GR SPEC: 1.01 — LOW (ref 1.01–1.02)
TROPONIN T SERPL-MCNC: <0.01 NG/ML — SIGNIFICANT CHANGE UP (ref 0–0.06)
UROBILINOGEN FLD QL: NEGATIVE — SIGNIFICANT CHANGE UP
WBC # BLD: 7 K/UL — SIGNIFICANT CHANGE UP (ref 3.8–10.5)
WBC # FLD AUTO: 7 K/UL — SIGNIFICANT CHANGE UP (ref 3.8–10.5)

## 2017-09-18 PROCEDURE — 85027 COMPLETE CBC AUTOMATED: CPT

## 2017-09-18 PROCEDURE — 82550 ASSAY OF CK (CPK): CPT

## 2017-09-18 PROCEDURE — 96372 THER/PROPH/DIAG INJ SC/IM: CPT

## 2017-09-18 PROCEDURE — 74176 CT ABD & PELVIS W/O CONTRAST: CPT

## 2017-09-18 PROCEDURE — 84484 ASSAY OF TROPONIN QUANT: CPT

## 2017-09-18 PROCEDURE — 81003 URINALYSIS AUTO W/O SCOPE: CPT

## 2017-09-18 PROCEDURE — 99285 EMERGENCY DEPT VISIT HI MDM: CPT

## 2017-09-18 PROCEDURE — 71045 X-RAY EXAM CHEST 1 VIEW: CPT

## 2017-09-18 PROCEDURE — 93010 ELECTROCARDIOGRAM REPORT: CPT

## 2017-09-18 PROCEDURE — 80053 COMPREHEN METABOLIC PANEL: CPT

## 2017-09-18 PROCEDURE — 71010: CPT | Mod: 26

## 2017-09-18 PROCEDURE — 83605 ASSAY OF LACTIC ACID: CPT

## 2017-09-18 PROCEDURE — 82553 CREATINE MB FRACTION: CPT

## 2017-09-18 PROCEDURE — 74176 CT ABD & PELVIS W/O CONTRAST: CPT | Mod: 26

## 2017-09-18 PROCEDURE — 99285 EMERGENCY DEPT VISIT HI MDM: CPT | Mod: 25,GC

## 2017-09-18 PROCEDURE — 99284 EMERGENCY DEPT VISIT MOD MDM: CPT | Mod: 25

## 2017-09-18 PROCEDURE — 93005 ELECTROCARDIOGRAM TRACING: CPT

## 2017-09-18 RX ORDER — INSULIN LISPRO 100/ML
2 VIAL (ML) SUBCUTANEOUS ONCE
Qty: 0 | Refills: 0 | Status: COMPLETED | OUTPATIENT
Start: 2017-09-18 | End: 2017-09-18

## 2017-09-18 RX ORDER — INSULIN HUMAN 100 [IU]/ML
2 INJECTION, SOLUTION SUBCUTANEOUS ONCE
Qty: 0 | Refills: 0 | Status: DISCONTINUED | OUTPATIENT
Start: 2017-09-18 | End: 2017-09-18

## 2017-09-18 RX ORDER — LIRAGLUTIDE 6 MG/ML
0 INJECTION SUBCUTANEOUS
Qty: 0 | Refills: 0 | COMMUNITY

## 2017-09-18 RX ORDER — METHOCARBAMOL 500 MG/1
750 TABLET, FILM COATED ORAL ONCE
Qty: 0 | Refills: 0 | Status: COMPLETED | OUTPATIENT
Start: 2017-09-18 | End: 2017-09-18

## 2017-09-18 RX ADMIN — Medication 4 MILLIGRAM(S): at 10:18

## 2017-09-18 RX ADMIN — METHOCARBAMOL 750 MILLIGRAM(S): 500 TABLET, FILM COATED ORAL at 09:00

## 2017-09-18 RX ADMIN — Medication 2 UNIT(S): at 13:32

## 2017-09-18 NOTE — ED ADULT NURSE REASSESSMENT NOTE - NS ED NURSE REASSESS COMMENT FT1
Received female alert and oriented x3 complaining of back pain radiating to the chest. Pt reports pain level of 10/10 on pain scale and sharp in quality. Pt denies any strenuous exercise or heavy lifting. Pt is awaiting lab results.

## 2017-09-18 NOTE — ED ADULT NURSE NOTE - OBJECTIVE STATEMENT
67 yo female with PMH of COPD, asthma, rectal cancer (last radiation July 16,2017), tracheomalacia, tracheal plasty,  presents to the ED from home via EMS c/o mid-upper back pain radiating to the front since 0300 this AM which woke her from sleep. patient states pain is sharp, 10/10 pain, worse when laying down and turning over in bed. patient is AAOx4. lung sounds clear bilaterally. cap refill <3sec. patient denies chest pain, SOB, abdominal pain, HA, dizziness, fevers, chills, N/V/D. dry cough present. abdomen is soft, non-tender non-distended. VSS. MD notified.

## 2017-09-18 NOTE — ED PROVIDER NOTE - PROGRESS NOTE DETAILS
seen by pulm fellow ok for d/c to make appt at [facility] for bronch with her surgeon. PMD Keegan aware and agreeable with plan.

## 2017-09-18 NOTE — ED PROVIDER NOTE - MUSCULOSKELETAL, MLM
Spine appears normal, range of motion is not limited, no muscle or joint tenderness. Mild b/l low thoracic paraspinal ttp without bony midline tenderness. +Pain on SLR b/l.

## 2017-09-18 NOTE — ASU PATIENT PROFILE, ADULT - PSH
Exostosis of orbit, left  left eye prosthetic  H/O pelvic surgery    H/O total knee replacement, bilateral    History of partial hysterectomy    History of sinus surgery

## 2017-09-18 NOTE — ASU PREOP CHECKLIST - SIDE RAILS UP
"        Diane Duke   3/10/2017 3:40 PM   Office Visit   MRN: [id number]    Department:  [facility]   Dept Phone:  [phone]    Description:  Female : 1991   Provider:  Roxane Da Silva D.O.           Reason for Visit     Annual Wellness Visit wellness physical      Allergies as of 3/10/2017     Allergen Noted Reactions    Sulfa Drugs 2015         You were diagnosed with     Well adult health check   [823950]       Well woman exam   [038066]       Obesity (BMI 30-39.9)   [213572]         Vital Signs     Blood Pressure Pulse Temperature Height Weight Body Mass Index    128/80 mmHg 74 36.4 °C (97.6 °F) 1.651 m (5' 5\") 89.812 kg (198 lb) 32.95 kg/m2    Oxygen Saturation Breastfeeding? Smoking Status             97% No Never Smoker          Basic Information     Date Of Birth Sex Race Ethnicity Preferred Language    1991 Female White, White Non- English      Problem List              ICD-10-CM Priority Class Noted - Resolved    Irregular periods/menstrual cycles N92.6   3/4/2016 - Present    Encounter for preconception consultation Z31.69   2016 - Present    Obesity (BMI 30-39.9) E66.9   3/10/2017 - Present      Health Maintenance        Date Due Completion Dates    IMM HEP B VACCINE (1 of 3 - Primary Series) 1991 ---    IMM HEP A VACCINE (1 of 2 - Standard Series) 3/20/1990 ---    IMM VARICELLA (CHICKENPOX) VACCINE (1 of 2 - 2 Dose Adolescent Series) 3/20/2002 ---    IMM DTaP/Tdap/Td Vaccine (1 - Tdap) 3/20/2008 ---    IMM INFLUENZA (1) 2016 ---    PAP SMEAR 2019, 2015            Current Immunizations     No immunizations on file.      Below and/or attached are the medications your provider expects you to take. Review all of your home medications and newly ordered medications with your provider and/or pharmacist. Follow medication instructions as directed by your provider and/or pharmacist. Please keep your medication list with you and share with " your provider. Update the information when medications are discontinued, doses are changed, or new medications (including over-the-counter products) are added; and carry medication information at all times in the event of emergency situations     Allergies:  SULFA DRUGS - (reactions not documented)               Medications  Valid as of: March 10, 2017 -  4:09 PM    Generic Name Brand Name Tablet Size Instructions for use    Albuterol Sulfate (Aero Soln) albuterol 108 (90 BASE) MCG/ACT Inhale 2 Puffs by mouth 2 times a day as needed for Shortness of Breath. 2 puffs  BID PRN SOB        Prenatal [id number]-Bunoda (Tab) PRENATE AM 1 MG Take 1 Tab by mouth every day.        .                 Medicines prescribed today were sent to:     [facility] [address] [place] [id number] [facility]    [address] [facility] [address]    Phone: [phone] Fax: [phone]    Open 24 Hours?: No    [facility] - [address] [address]    Phone: [phone] Fax: [phone]    Open 24 Hours?: No      Medication refill instructions:       If your prescription bottle indicates you have medication refills left, it is not necessary to call your provider’s office. Please contact your pharmacy and they will refill your medication.    If your prescription bottle indicates you do not have any refills left, you may request refills at any time through one of the following ways: The online [employer] system (except Urgent Care), by calling your provider’s office, or by asking your pharmacy to contact your provider’s office with a refill request. Medication refills are processed only during regular business hours and may not be available until the next business day. Your provider may request additional information or to have a follow-up visit with you prior to refilling your medication.   *Please Note: Medication  refills are assigned a new Rx number when refilled electronically. Your pharmacy may indicate that no refills were authorized even though a new prescription for the same medication is available at the pharmacy. Please request the medicine by name with the pharmacy before contacting your provider for a refill.        Your To Do List     Future Labs/Procedures Complete By Expires    CBC WITH DIFFERENTIAL  As directed 3/11/2018    COMP METABOLIC PANEL  As directed 3/11/2018    LIPID PROFILE  As directed 3/11/2018    VITAMIN D,25 HYDROXY  As directed 3/11/2018      Referral     A referral request has been sent to our patient care coordination department. Please allow 3-5 business days for us to process this request and contact you either by phone or mail. If you do not hear from us by the 5th business day, please call us at [phone].           [employer] Access Code: Activation code not generated  Current [employer] Status: Active           n/a

## 2017-09-18 NOTE — ED PROVIDER NOTE - MEDICAL DECISION MAKING DETAILS
66 F atraumatic low back pain/abdominal pain in setting of cancer hx, will do labs/imaging/analgesia and reassess. 66 F atraumatic low back pain/abdominal pain in setting of cancer hx, will do labs/imaging/analgesia and reassess.  Maribell: low back pain in the setting of known cancer. concern for pathologic fx. No cauda equina. Treat pain.

## 2017-09-18 NOTE — ED PROVIDER NOTE - OBJECTIVE STATEMENT
66 year old woman PMH anorectal CA on chemo/radiation, a-fib on eliquis, COPD, adrenal insufficiency on steroids presents with abdominal pain and chest pain. Says that she has been unable to get to sleep due to b/l back pain describes low thoracic back pain radiating around to her abdomen worse with movement better with rest/immobility, sharp quality 5-10/10 severity depending on position. Says that when she lays flat she also feels pain in her epigastrium with worse SOB, but no n/v/d and has been in her usual state of health otherwise no recent fever/chills, cough. No neuro sx. No traumatic injury. No urinary symptoms.

## 2017-09-18 NOTE — CONSULT NOTE ADULT - SUBJECTIVE AND OBJECTIVE BOX
CHIEF COMPLAINT: Back pain    HPI:     66 yr F with PMH of COPD/asthma, tracheobronchomalacia s/p tracheo-bronchoplasty in 10/16, Afib on Eliquis , Adrenal Insufficieny on steroids,  DM2, HTN, Squamous cell CA of the anus on chemo/XRT, now presenting with vague generalized dull back pain, which is 4-5/10 in severity and radiates to the lower chest and upper abdomen. She reported that the pain began gradually yesterday and is aggravated by movement and relieved by rest. She denies any fever, chills, numbness, tingling, rash, weakness in extremities, bowel or urinary incontinence. She does not recall any falls or  trauma to the back. The pain is associated with some shortness of breath. She reports mild worsening of her chronic cough over the past 2-3 days and is expectorating thin light yellow mucus. In the ED her neurological exam was normal. A Ct abd/pelvis did not reveal a cause for her pain but showed an unchanged right lower lobe lung nodule and pleural soft tissue mass . Her pain resolved without any intervention in the ED.       PAST MEDICAL & SURGICAL HISTORY:  Tracheobronchomalacia  Hypertension  Asthma  Pelvic fracture  Aortic insufficiency: mod/severe AI on echo 16  Adrenal insufficiency  COPD (chronic obstructive pulmonary disease)  Diabetes: type two. insulin dependent  Atrial fibrillation  H/O pelvic surgery  Exostosis of orbit, left  History of sinus surgery  H/O total knee replacement, bilateral  History of partial hysterectomy      FAMILY HISTORY:  Family history of breast cancer (Sibling)  Family history of asthma      SOCIAL HISTORY:  Smoking: [x ] Never Smoked [ ] Former Smoker (__ packs x ___ years) [ ] Current Smoker  (__ packs x ___ years)  Substance Use: [ x] Never Used [ ] Used ____  EtOH Use: social  Marital Status: [ ] Single [x ]  [ ]  [ ]   Sexual History: NC  Occupation: Retired  Recent Travel: None  Advance Directives: Full code     Allergies    ampicillin (Short breath)  aspirin (Short breath)  Avelox (Short breath)  codeine (Short breath)  Dilaudid (Short breath)  iodine (Short breath)  penicillin (Short breath)  shellfish (Anaphylaxis)  tetanus toxoid (Short breath)  Valium (Short breath)    Intolerances        HOME MEDICATIONS:  methylPREDNISolone 8 mg oral tablet: 1 tab(s) orally once a day  Ceftin 500 mg oral tablet: 1 tab(s) orally 2 times a day  loratadine 10 mg oral tablet: 1 tab(s) orally once a day  apixaban 5 mg oral tablet: 1 tab(s) orally 2 times a day  ALPRAZolam 0.5 mg oral tablet: 1 tab(s) orally 3 times a day, As needed, anxiety  hydrocortisone 25 mg rectal suppository: 1 suppository(ies) rectal once a day  montelukast 10 mg oral tablet: 1 tab(s) orally once a day  HumaLOG KwikPen 100 units/mL subcutaneous solution: 7 unit(s) subcutaneous 3 times a day  Lantus Solostar Pen 100 units/mL subcutaneous solution: 12 unit(s) subcutaneous once a day (at bedtime)  aluminum hydroxide-magnesium hydroxide 200 mg-200 mg/5 mL oral suspension: 30 milliliter(s) orally every 8 hours, As Needed, Dyspepsia  pregabalin 150 mg oral capsule: 1 cap(s) orally 2 times a day  albuterol CFC free 90 mcg/inh inhalation aerosol: 1 puff(s) inhaled every 4 hours, As Needed - for shortness of breath and/or wheezing  Advair Diskus 500 mcg-50 mcg inhalation powder: 1 puff(s) inhaled 2 times a day  pantoprazole 40 mg oral delayed release tablet: 1 tab(s) orally once a day (before a meal)  polyethylene glycol 3350 oral powder for reconstitution: 17 gram(s) orally once a day, As needed, Constipation  docusate sodium 100 mg oral capsule: 1 cap(s) orally 2 times a day  digoxin 250 mcg (0.25 mg) oral tablet: 1 tab(s) orally once a day  Spiriva 18 mcg inhalation capsule: 1 cap(s) inhaled once a day  Victoza:  subcutaneous once a day      REVIEW OF SYSTEMS:  Constitutional: [ ] negative [- ] fevers [- ] chills [ -] weight loss [ ] weight gain  HEENT: [ ] negative [- ] dry eyes [- ] eye irritation [- ] postnasal drip [- ] nasal congestion  CV: [ ] negative  [- ] chest pain [- ] orthopnea [- ] palpitations [- ] murmur  Resp: [ ] negative [+ ] cough [ +] shortness of breath [ ] dyspnea [ -] wheezing [+ ] sputum [- ] hemoptysis  GI: [ ] negative [- ] nausea [- ] vomiting [- ] diarrhea [- ] constipation [+ ] abd pain [- ] dysphagia   : [ ] negative [ -] dysuria [- ] nocturia [- ] hematuria [- ] increased urinary frequency  Musculoskeletal: [ ] negative [+ ] back pain [- ] myalgias [- ] arthralgias [- ] fracture  Skin: [ ] negative [- ] rash [ ] itch  Neurological: [ ] negative [- ] headache [- ] dizziness [- ] syncope [- ] weakness [- ] numbness  Psychiatric: [ ] negative [+ ] anxiety [ ] depression  Endocrine: [ ] negative [+ ] diabetes [ ] thyroid problem  Hematologic/Lymphatic: [ ] negative [ ] anemia [- ] bleeding problem  Allergic/Immunologic: [ ] negative [- ] itchy eyes [ ] nasal discharge [- ] hives [ ] angioedema  [x ] All other systems negative  [ ] Unable to assess ROS because ________    OBJECTIVE:  ICU Vital Signs Last 24 Hrs  T(C): 36.9 (18 Sep 2017 14:22), Max: 37.1 (18 Sep 2017 11:08)  T(F): 98.5 (18 Sep 2017 14:22), Max: 98.7 (18 Sep 2017 11:08)  HR: 72 (18 Sep 2017 14:22) (72 - 90)  BP: 156/87 (18 Sep 2017 14:22) (147/74 - 156/87)  BP(mean): --  ABP: --  ABP(mean): --  RR: 19 (18 Sep 2017 14:22) (17 - 19)  SpO2: 99% (18 Sep 2017 14:22) (96% - 99%)        CAPILLARY BLOOD GLUCOSE  199 (18 Sep 2017 12:34)          PHYSICAL EXAM:  General: NAD, appears comfortable, speaking in full sentences   HEENT: PERRLA  Lymph Nodes: None palpable  Neck: Supple  Respiratory: Decreased BS b/l bases, slightly coarse BS b/l but no wheezing or crackles  Cardiovascular: Irregular, S1+S2+0  Abdomen: Soft, NT, ND, BS+  Extremities: Mil LE edema b/l ankles   Skin: Chronic LE skin changes   Neurological: AAOx3, grossly non focal.  Strength 5/5 in all extremities, cranial nerves intact, no motor or sensory deficits.   Psychiatry: Mild anxiety    HOSPITAL MEDICATIONS:    Prednisone  Robaxin  Humalog      LABS:                        12.5   7.0   )-----------( 227      ( 18 Sep 2017 08:48 )             39.6     Hgb Trend: 12.5<--      145  |  106  |  13  ----------------------------<  118<H>  3.9   |  26  |  0.65    Ca    8.9      18 Sep 2017 08:48    TPro  6.7  /  Alb  3.9  /  TBili  0.3  /  DBili  x   /  AST  14  /  ALT  11  /  AlkPhos  83      Creatinine Trend: 0.65<--, 0.60<--    Urinalysis Basic - ( 18 Sep 2017 10:27 )    Color: x / Appearance: Clear / S.006 / pH: x  Gluc: x / Ketone: Negative  / Bili: Negative / Urobili: Negative   Blood: x / Protein: Negative / Nitrite: Negative   Leuk Esterase: Negative / RBC: x / WBC x   Sq Epi: x / Non Sq Epi: x / Bacteria: x            MICROBIOLOGY:     RADIOLOGY:      [ ] Reviewed and interpreted by me  < from: CT Abdomen and Pelvis w/ Oral Cont (17 @ 09:33) >  No bowel obstruction or inflammation.    2. Right lower lobe lung nodule and pleural soft tissue mass unchanged.    3. There are 2 exophyticpancreatic tail cysts unchanged; differentials   are IPMN or cystadenoma. Nonemergent MR scan can be performed.    < from: Xray Chest 1 View AP/PA (17 @ 08:17) >  The cardiac silhouette is normal in size. There is a   right-sided Mediport with its tip overlying the superior vena cava. There   are no focal consolidations or pleural effusions. The hilar and   mediastinal structures appear unremarkable. The osseous structures are   intact.        PULMONARY FUNCTION TESTS:    EKG:

## 2017-09-18 NOTE — ED PROVIDER NOTE - ATTENDING CONTRIBUTION TO CARE
I performed a history and physical exam of the patient and discussed their management with the resident. I reviewed the resident's note and agree with the documented findings and plan of care. My medical decison making and observations are found above.  Abd soft. Walking. gait. (slightly antalgic)

## 2017-09-18 NOTE — ASU PREOP CHECKLIST - INTERNAL PROSTHESES
yes(specify)/b/l TKR, Left eye prosthetic,Right upper chest wall mediport b/l TKR, Left eye prosthetic,Right upper chest wall mediport, Titanium plate on plevis/yes(specify)

## 2017-09-18 NOTE — ED PROVIDER NOTE - NEUROLOGICAL, MLM
Alert and oriented, no focal deficits, no motor or sensory deficits. Normal strength/sensation b/l LE.

## 2017-09-18 NOTE — CONSULT NOTE ADULT - ASSESSMENT
66 yr F with PMH of COPD/asthma, tracheobronchomalacia s/p tracheo-bronchoplasty in 10/16, Afib on Eliquis , Adrenal Insufficieny on steroids,  DM2, HTN, Squamous cell CA of the anus on chemo/XRT, now 66 yr F with PMH of COPD/asthma, tracheobronchomalacia s/p tracheo-bronchoplasty in 10/16, Afib on Eliquis , Adrenal Insufficieny on steroids,  DM2, HTN, Squamous cell CA of the anus on chemo/XRT, now presenting with vague back pain which has now resolved.     1. Back pain:  - Unclear etiology. Pt is describing it as band like pain below the rib cage. ? shingles but no rash is visible at this time.  -Ct abd/pelvis w/ contrast was unrevealing Neuro exam non focal, no sensory or motor deficits, no urine or bowel incontinence.   - Given h/o anal cancer would likely get an oupt MRI of the spine if pain recurs    2. Tracheobronchomalacia s/p tracheobronchoplasty:  - Pt has an appointment with Dr. Zapien at 8 am tomorrow at [place] for a Bronchoscopy and further evaluation of her shortness of breath  - Ongoing cough with sputum production, but no radiological evidence of PNA, afebrile, no leukocytosis.     3. COPD/Asthma:  - no evidence of acute exacerbation right now  - cont Singulair, albuterol, Advair BID and Spiriva daily    4. Adrenal inufficiency:  - Cont home dose steroids    5. RLL lung nodule/ pleural based soft tissue mass:  - Unchanged on CT  - Oupt CT in 3 months for follow up.   - Oupt follow up with Dr. Allison upon discharge.

## 2017-09-19 ENCOUNTER — APPOINTMENT (OUTPATIENT)
Dept: INFUSION THERAPY | Facility: HOSPITAL | Age: 67
End: 2017-09-19

## 2017-09-19 ENCOUNTER — APPOINTMENT (OUTPATIENT)
Dept: THORACIC SURGERY | Facility: HOSPITAL | Age: 67
End: 2017-09-19

## 2017-09-19 ENCOUNTER — OUTPATIENT (OUTPATIENT)
Dept: OUTPATIENT SERVICES | Facility: HOSPITAL | Age: 67
LOS: 1 days | Discharge: HOME | End: 2017-09-19
Payer: MEDICARE

## 2017-09-19 VITALS — DIASTOLIC BLOOD PRESSURE: 58 MMHG | SYSTOLIC BLOOD PRESSURE: 117 MMHG | HEART RATE: 62 BPM

## 2017-09-19 DIAGNOSIS — Z98.89 OTHER SPECIFIED POSTPROCEDURAL STATES: Chronic | ICD-10-CM

## 2017-09-19 DIAGNOSIS — Z96.653 PRESENCE OF ARTIFICIAL KNEE JOINT, BILATERAL: Chronic | ICD-10-CM

## 2017-09-19 DIAGNOSIS — H05.352 EXOSTOSIS OF LEFT ORBIT: Chronic | ICD-10-CM

## 2017-09-19 LAB
GRAM STN FLD: SIGNIFICANT CHANGE UP
SPECIMEN SOURCE: SIGNIFICANT CHANGE UP

## 2017-09-19 PROCEDURE — 31624 DX BRONCHOSCOPE/LAVAGE: CPT

## 2017-09-19 PROCEDURE — 71010: CPT | Mod: 26

## 2017-09-19 NOTE — BRIEF OPERATIVE NOTE - OPERATION/FINDINGS
distal trachea/balwinder - approx 75% collapse of trachea  proximal trachea approximately 30% collapse of trachea

## 2017-09-20 ENCOUNTER — INPATIENT (INPATIENT)
Facility: HOSPITAL | Age: 67
LOS: 2 days | Discharge: HOME | DRG: 205 | End: 2017-09-23
Attending: INTERNAL MEDICINE | Admitting: INTERNAL MEDICINE
Payer: MEDICARE

## 2017-09-20 ENCOUNTER — APPOINTMENT (OUTPATIENT)
Dept: THORACIC SURGERY | Facility: HOSPITAL | Age: 67
End: 2017-09-20

## 2017-09-20 VITALS
TEMPERATURE: 99 F | HEART RATE: 72 BPM | SYSTOLIC BLOOD PRESSURE: 135 MMHG | RESPIRATION RATE: 16 BRPM | OXYGEN SATURATION: 99 % | DIASTOLIC BLOOD PRESSURE: 83 MMHG

## 2017-09-20 DIAGNOSIS — Z98.89 OTHER SPECIFIED POSTPROCEDURAL STATES: Chronic | ICD-10-CM

## 2017-09-20 DIAGNOSIS — J45.909 UNSPECIFIED ASTHMA, UNCOMPLICATED: ICD-10-CM

## 2017-09-20 DIAGNOSIS — R63.8 OTHER SYMPTOMS AND SIGNS CONCERNING FOOD AND FLUID INTAKE: ICD-10-CM

## 2017-09-20 DIAGNOSIS — Z96.653 PRESENCE OF ARTIFICIAL KNEE JOINT, BILATERAL: Chronic | ICD-10-CM

## 2017-09-20 DIAGNOSIS — I48.91 UNSPECIFIED ATRIAL FIBRILLATION: ICD-10-CM

## 2017-09-20 DIAGNOSIS — I48.2 CHRONIC ATRIAL FIBRILLATION: ICD-10-CM

## 2017-09-20 DIAGNOSIS — J44.1 CHRONIC OBSTRUCTIVE PULMONARY DISEASE WITH (ACUTE) EXACERBATION: ICD-10-CM

## 2017-09-20 DIAGNOSIS — H05.352 EXOSTOSIS OF LEFT ORBIT: Chronic | ICD-10-CM

## 2017-09-20 DIAGNOSIS — J44.9 CHRONIC OBSTRUCTIVE PULMONARY DISEASE, UNSPECIFIED: ICD-10-CM

## 2017-09-20 DIAGNOSIS — J39.8 OTHER SPECIFIED DISEASES OF UPPER RESPIRATORY TRACT: ICD-10-CM

## 2017-09-20 DIAGNOSIS — Z29.9 ENCOUNTER FOR PROPHYLACTIC MEASURES, UNSPECIFIED: ICD-10-CM

## 2017-09-20 DIAGNOSIS — E27.40 UNSPECIFIED ADRENOCORTICAL INSUFFICIENCY: ICD-10-CM

## 2017-09-20 DIAGNOSIS — E11.9 TYPE 2 DIABETES MELLITUS WITHOUT COMPLICATIONS: ICD-10-CM

## 2017-09-20 LAB
ANION GAP SERPL CALC-SCNC: 11 MMOL/L — SIGNIFICANT CHANGE UP (ref 5–17)
BASOPHILS NFR BLD AUTO: 0.2 % — SIGNIFICANT CHANGE UP (ref 0–2)
BUN SERPL-MCNC: 9 MG/DL — SIGNIFICANT CHANGE UP (ref 7–23)
CALCIUM SERPL-MCNC: 8.9 MG/DL — SIGNIFICANT CHANGE UP (ref 8.4–10.5)
CHLORIDE SERPL-SCNC: 100 MMOL/L — SIGNIFICANT CHANGE UP (ref 96–108)
CO2 SERPL-SCNC: 28 MMOL/L — SIGNIFICANT CHANGE UP (ref 22–31)
CREAT SERPL-MCNC: 0.73 MG/DL — SIGNIFICANT CHANGE UP (ref 0.5–1.3)
EOSINOPHIL NFR BLD AUTO: 3.7 % — SIGNIFICANT CHANGE UP (ref 0–6)
GLUCOSE SERPL-MCNC: 211 MG/DL — HIGH (ref 70–99)
HCT VFR BLD CALC: 36.6 % — SIGNIFICANT CHANGE UP (ref 34.5–45)
HGB BLD-MCNC: 11.5 G/DL — SIGNIFICANT CHANGE UP (ref 11.5–15.5)
LYMPHOCYTES # BLD AUTO: 17.5 % — SIGNIFICANT CHANGE UP (ref 13–44)
MCHC RBC-ENTMCNC: 24.8 PG — LOW (ref 27–34)
MCHC RBC-ENTMCNC: 31.4 G/DL — LOW (ref 32–36)
MCV RBC AUTO: 78.9 FL — LOW (ref 80–100)
MONOCYTES NFR BLD AUTO: 19.9 % — HIGH (ref 2–14)
NEUTROPHILS NFR BLD AUTO: 58.7 % — SIGNIFICANT CHANGE UP (ref 43–77)
NIGHT BLUE STAIN TISS: SIGNIFICANT CHANGE UP
PLATELET # BLD AUTO: 221 K/UL — SIGNIFICANT CHANGE UP (ref 150–400)
POTASSIUM SERPL-MCNC: 3.8 MMOL/L — SIGNIFICANT CHANGE UP (ref 3.5–5.3)
POTASSIUM SERPL-SCNC: 3.8 MMOL/L — SIGNIFICANT CHANGE UP (ref 3.5–5.3)
RBC # BLD: 4.64 M/UL — SIGNIFICANT CHANGE UP (ref 3.8–5.2)
RBC # FLD: 14.7 % — SIGNIFICANT CHANGE UP (ref 10.3–16.9)
SODIUM SERPL-SCNC: 139 MMOL/L — SIGNIFICANT CHANGE UP (ref 135–145)
SPECIMEN SOURCE: SIGNIFICANT CHANGE UP
WBC # BLD: 4.6 K/UL — SIGNIFICANT CHANGE UP (ref 3.8–10.5)
WBC # FLD AUTO: 4.6 K/UL — SIGNIFICANT CHANGE UP (ref 3.8–10.5)

## 2017-09-20 PROCEDURE — 99221 1ST HOSP IP/OBS SF/LOW 40: CPT

## 2017-09-20 PROCEDURE — 93010 ELECTROCARDIOGRAM REPORT: CPT

## 2017-09-20 PROCEDURE — 99223 1ST HOSP IP/OBS HIGH 75: CPT | Mod: GC

## 2017-09-20 PROCEDURE — 99285 EMERGENCY DEPT VISIT HI MDM: CPT | Mod: 25

## 2017-09-20 PROCEDURE — 71010: CPT | Mod: 26

## 2017-09-20 RX ORDER — ALBUTEROL 90 UG/1
2 AEROSOL, METERED ORAL EVERY 4 HOURS
Qty: 0 | Refills: 0 | Status: DISCONTINUED | OUTPATIENT
Start: 2017-09-20 | End: 2017-09-22

## 2017-09-20 RX ORDER — PANTOPRAZOLE SODIUM 20 MG/1
40 TABLET, DELAYED RELEASE ORAL
Qty: 0 | Refills: 0 | Status: DISCONTINUED | OUTPATIENT
Start: 2017-09-20 | End: 2017-09-23

## 2017-09-20 RX ORDER — CEFTRIAXONE 500 MG/1
1 INJECTION, POWDER, FOR SOLUTION INTRAMUSCULAR; INTRAVENOUS EVERY 24 HOURS
Qty: 0 | Refills: 0 | Status: DISCONTINUED | OUTPATIENT
Start: 2017-09-21 | End: 2017-09-21

## 2017-09-20 RX ORDER — TIOTROPIUM BROMIDE 18 UG/1
1 CAPSULE ORAL; RESPIRATORY (INHALATION) DAILY
Qty: 0 | Refills: 0 | Status: DISCONTINUED | OUTPATIENT
Start: 2017-09-20 | End: 2017-09-21

## 2017-09-20 RX ORDER — APIXABAN 2.5 MG/1
5 TABLET, FILM COATED ORAL EVERY 12 HOURS
Qty: 0 | Refills: 0 | Status: DISCONTINUED | OUTPATIENT
Start: 2017-09-20 | End: 2017-09-23

## 2017-09-20 RX ORDER — ACETAMINOPHEN 500 MG
650 TABLET ORAL ONCE
Qty: 0 | Refills: 0 | Status: COMPLETED | OUTPATIENT
Start: 2017-09-20 | End: 2017-09-20

## 2017-09-20 RX ORDER — CEFTRIAXONE 500 MG/1
1 INJECTION, POWDER, FOR SOLUTION INTRAMUSCULAR; INTRAVENOUS ONCE
Qty: 0 | Refills: 0 | Status: COMPLETED | OUTPATIENT
Start: 2017-09-20 | End: 2017-09-20

## 2017-09-20 RX ORDER — DEXTROSE 50 % IN WATER 50 %
25 SYRINGE (ML) INTRAVENOUS ONCE
Qty: 0 | Refills: 0 | Status: DISCONTINUED | OUTPATIENT
Start: 2017-09-20 | End: 2017-09-23

## 2017-09-20 RX ORDER — DIGOXIN 250 MCG
0.25 TABLET ORAL DAILY
Qty: 0 | Refills: 0 | Status: DISCONTINUED | OUTPATIENT
Start: 2017-09-20 | End: 2017-09-23

## 2017-09-20 RX ORDER — SODIUM CHLORIDE 9 MG/ML
1000 INJECTION, SOLUTION INTRAVENOUS
Qty: 0 | Refills: 0 | Status: DISCONTINUED | OUTPATIENT
Start: 2017-09-20 | End: 2017-09-23

## 2017-09-20 RX ORDER — LORATADINE 10 MG/1
10 TABLET ORAL DAILY
Qty: 0 | Refills: 0 | Status: DISCONTINUED | OUTPATIENT
Start: 2017-09-20 | End: 2017-09-23

## 2017-09-20 RX ORDER — OMALIZUMAB 150 MG/ML
0 INJECTION, SOLUTION SUBCUTANEOUS
Qty: 0 | Refills: 0 | COMMUNITY

## 2017-09-20 RX ORDER — INSULIN LISPRO 100/ML
VIAL (ML) SUBCUTANEOUS
Qty: 0 | Refills: 0 | Status: DISCONTINUED | OUTPATIENT
Start: 2017-09-20 | End: 2017-09-23

## 2017-09-20 RX ORDER — GLUCAGON INJECTION, SOLUTION 0.5 MG/.1ML
1 INJECTION, SOLUTION SUBCUTANEOUS ONCE
Qty: 0 | Refills: 0 | Status: DISCONTINUED | OUTPATIENT
Start: 2017-09-20 | End: 2017-09-23

## 2017-09-20 RX ORDER — MONTELUKAST 4 MG/1
10 TABLET, CHEWABLE ORAL DAILY
Qty: 0 | Refills: 0 | Status: DISCONTINUED | OUTPATIENT
Start: 2017-09-20 | End: 2017-09-23

## 2017-09-20 RX ORDER — IPRATROPIUM/ALBUTEROL SULFATE 18-103MCG
3 AEROSOL WITH ADAPTER (GRAM) INHALATION EVERY 6 HOURS
Qty: 0 | Refills: 0 | Status: DISCONTINUED | OUTPATIENT
Start: 2017-09-20 | End: 2017-09-20

## 2017-09-20 RX ORDER — DEXTROSE 50 % IN WATER 50 %
1 SYRINGE (ML) INTRAVENOUS ONCE
Qty: 0 | Refills: 0 | Status: DISCONTINUED | OUTPATIENT
Start: 2017-09-20 | End: 2017-09-23

## 2017-09-20 RX ORDER — ONDANSETRON 8 MG/1
4 TABLET, FILM COATED ORAL EVERY 6 HOURS
Qty: 0 | Refills: 0 | Status: COMPLETED | OUTPATIENT
Start: 2017-09-20 | End: 2017-09-21

## 2017-09-20 RX ORDER — DEXTROSE 50 % IN WATER 50 %
12.5 SYRINGE (ML) INTRAVENOUS ONCE
Qty: 0 | Refills: 0 | Status: DISCONTINUED | OUTPATIENT
Start: 2017-09-20 | End: 2017-09-23

## 2017-09-20 RX ORDER — BUDESONIDE AND FORMOTEROL FUMARATE DIHYDRATE 160; 4.5 UG/1; UG/1
2 AEROSOL RESPIRATORY (INHALATION)
Qty: 0 | Refills: 0 | Status: DISCONTINUED | OUTPATIENT
Start: 2017-09-20 | End: 2017-09-20

## 2017-09-20 RX ORDER — ACETAMINOPHEN 500 MG
650 TABLET ORAL EVERY 6 HOURS
Qty: 0 | Refills: 0 | Status: DISCONTINUED | OUTPATIENT
Start: 2017-09-20 | End: 2017-09-23

## 2017-09-20 RX ADMIN — CEFTRIAXONE 100 GRAM(S): 500 INJECTION, POWDER, FOR SOLUTION INTRAMUSCULAR; INTRAVENOUS at 20:25

## 2017-09-20 RX ADMIN — Medication 650 MILLIGRAM(S): at 17:28

## 2017-09-20 RX ADMIN — Medication 40 MILLIGRAM(S): at 20:25

## 2017-09-20 RX ADMIN — Medication 650 MILLIGRAM(S): at 20:06

## 2017-09-20 NOTE — CONSULT NOTE ADULT - PROBLEM SELECTOR RECOMMENDATION 9
Likely an overlap syndrome of asthma and copd.  The patient has worsening dyspnea, wheezing, and increase/change in sputum from baseline after bronchoscopy.  Low grade temperature of 100.1.  Would give Ceftriaxone (to cover gram negative rods growing from bronchoscopy-cover prior organisms that have grown in the sputum).  -Start prednisone 40mg orally daily.  -Albuterol q4 standing for now.  -Continue spiriva.  -Monitor peak flow.  -OOB to chair and encourage ambulation.  -Continue anticoagulation.  -Potential discharge tomorrow if does well overnight.  -Discussed with Dr. Patel.

## 2017-09-20 NOTE — H&P ADULT - PROBLEM SELECTOR PLAN 1
CXR showing increased density in RLL suggestive of possible aspiration PNA  Recent bronch growing > 100,000 cfu gm- rods  - pulm consulted: recommended ceftriaxone based on previous sensitivities   - CT surgery consulted; appreciate recs  - c/w ceftriaxone 1g q24h   - CXR showing increased density in RLL suggestive of possible aspiration PNA s/p recent bronchoscopy on 9/19.   Currently, not meeting criteria for SIRS  Recent bronch growing > 100,000 cfu gm- rods  - f/u bronch cx  - pulm consulted: recommended ceftriaxone based on previous sensitivities   - CT surgery consulted; appreciate recs  - c/w ceftriaxone 1g q24h   - f/u blood cx CXR showing increased density in RLL suggestive of possible aspiration PNA in setting of recent bronchoscopy on 9/19.   Currently, not meeting criteria for SIRS  Recent bronch growing > 100,000 cfu gm- rods  - f/u bronch cx  - pulm consulted: recommended ceftriaxone based on previous sensitivities   - CT surgery consulted; appreciate recs  - c/w ceftriaxone 1g q24h   - f/u blood cx

## 2017-09-20 NOTE — CONSULT NOTE ADULT - SUBJECTIVE AND OBJECTIVE BOX
66 year old woman with history of asthma, empysema, tracheobronchomalacia s/p tracheobronchoplasty, afib on eliquis, squamous cell ca of anus who presents feeling more dyspneic, with worsened cough and increased sputum production since a bronchoscopy yesterday with thoracic surgery.        REVIEW OF SYSTEMS:  Constitutional: No fever, weight loss or fatigue  Eyes: No eye pain, visual disturbances, or discharge  ENMT:  No difficulty hearing, tinnitus, vertigo; No sinus or throat pain  Neck: No pain, stiffness or neck swelling  Respiratory: No cough, wheezing, chills or hemoptysis  Cardiovascular: No chest pain, palpitations, dizziness or leg swelling  Gastrointestinal: No abdominal or epigastric pain. No nausea, vomiting or hematemesis; No diarrhea or constipation. No melena or hematochezia.  Genitourinary: No dysuria, frequency, hematuria or incontinence  Neurological: No headaches, memory loss, loss of strength, numbness or tremors  Skin: No itching, burning, rashes or lesions   Lymph Nodes: No enlarged glands  Endocrine: No heat or cold intolerance; No hair loss  Musculoskeletal: No joint pain or swelling; No muscle, back or extremity pain  Psychiatric: No depression, anxiety, mood swings or difficulty sleeping  Heme/Lymph: No easy bruising or bleeding gums  Allergy and Immunologic: No hives or eczema    PAST MEDICAL & SURGICAL HISTORY:  Aortic disease: leaky valve  Colorectal cancer: 7/2017- last treatment , chemo and radiation  Tracheobronchomalacia  Asthma  Pelvic fracture  Aortic insufficiency: mod/severe AI on echo 9/25/16  Adrenal insufficiency  COPD (chronic obstructive pulmonary disease)  Diabetes: type two. insulin dependent  Atrial fibrillation  H/O pelvic surgery  Exostosis of orbit, left: left eye prosthetic  History of sinus surgery  H/O total knee replacement, bilateral  History of partial hysterectomy      FAMILY HISTORY:  Family history of breast cancer (Sibling)  Family history of asthma      SOCIAL HISTORY:  Smoking Status: [ ] Current, [ ] Former, [ ] Never  Pack Years:    MEDICATIONS:  Pulmonary:    Antimicrobials:    Anticoagulants:    Onc:    GI/:    Endocrine:    Cardiac:    Other Medications:      Allergies    ampicillin (Short breath)  aspirin (Short breath)  Avelox (Short breath)  codeine (Short breath)  Dilaudid (Short breath)  iodine (Short breath)  penicillin (Short breath)  shellfish (Anaphylaxis)  tetanus toxoid (Short breath)  Valium (Short breath)    Intolerances        Vital Signs Last 24 Hrs  T(C): 36.7 (20 Sep 2017 20:14), Max: 37 (20 Sep 2017 15:37)  T(F): 98.1 (20 Sep 2017 20:14), Max: 98.6 (20 Sep 2017 15:37)  HR: 80 (20 Sep 2017 20:14) (72 - 80)  BP: 106/63 (20 Sep 2017 20:14) (106/63 - 135/83)  BP(mean): --  RR: 18 (20 Sep 2017 20:15) (16 - 18)  SpO2: 98% (20 Sep 2017 20:15) (93% - 99%)        PHYSICAL EXAM:    General: Well developed; well nourished; in no acute distress  Eyes: PERRL, EOM intact; conjunctiva and sclera clear  Head: Normocephalic; atraumatic  ENMT: No nasal discharge; airway clear  Neck: Supple; non tender; no masses  Respiratory: Clear to auscultation bilaterally without wheezing, rhonchi or rales  Cardiovascular: Regular rate and rhythm. S1 and S2 Normal; No murmurs, gallops or rubs  Gastrointestinal: Soft non-tender non-distended; Normal bowel sounds; No hepatosplenomegaly  Genitourinary: No costovertebral angle tenderness  Extremities: Normal range of motion, No clubbing, cyanosis or edema  Vascular: Peripheral pulses palpable 2+ bilaterally  Neurological: Alert and oriented x3  Skin: Warm and dry. No obvious rash  Lymph Nodes: No acute cervical or supraclavicular adenopathy  Musculoskeletal: Normal gait, tone, without deformities  Psychiatric: Cooperative and appropriate mood    LABS:      CBC Full  -  ( 20 Sep 2017 17:21 )  WBC Count : 4.6 K/uL  Hemoglobin : 11.5 g/dL  Hematocrit : 36.6 %  Platelet Count - Automated : 221 K/uL  Mean Cell Volume : 78.9 fL  Mean Cell Hemoglobin : 24.8 pg  Mean Cell Hemoglobin Concentration : 31.4 g/dL  Auto Neutrophil # : x  Auto Lymphocyte # : x  Auto Monocyte # : x  Auto Eosinophil # : x  Auto Basophil # : x  Auto Neutrophil % : 58.7 %  Auto Lymphocyte % : 17.5 %  Auto Monocyte % : 19.9 %  Auto Eosinophil % : 3.7 %  Auto Basophil % : 0.2 %    09-20    139  |  100  |  9   ----------------------------<  211<H>  3.8   |  28  |  0.73    Ca    8.9      20 Sep 2017 17:21                        RADIOLOGY & ADDITIONAL STUDIES (The following images were personally reviewed): 66 year old woman with history of asthma, empysema, tracheobronchomalacia s/p tracheobronchoplasty, afib on eliquis, squamous cell ca of anus who presents feeling more dyspneic, with worsened cough and increased sputum production since a bronchoscopy yesterday with thoracic surgery to evaluate the extent of her tracheomalacia.  The patient also states that she had a "fever" after she checked her temperature and it was 100.1.  The patient notes some wheezing as well.  Overall she does not feel well and worse than her  baseline         REVIEW OF SYSTEMS:  Constitutional: No fever, weight loss or fatigue  Eyes: No eye pain, visual disturbances, or discharge  ENMT:  No difficulty hearing, tinnitus, vertigo; No sinus or throat pain  Neck: No pain, stiffness or neck swelling  Respiratory: No cough, wheezing, chills or hemoptysis  Cardiovascular: No chest pain, palpitations, dizziness or leg swelling  Gastrointestinal: No abdominal or epigastric pain. No nausea, vomiting or hematemesis; No diarrhea or constipation. No melena or hematochezia.  Genitourinary: No dysuria, frequency, hematuria or incontinence  Neurological: No headaches, memory loss, loss of strength, numbness or tremors  Skin: No itching, burning, rashes or lesions   Lymph Nodes: No enlarged glands  Endocrine: No heat or cold intolerance; No hair loss  Musculoskeletal: No joint pain or swelling; No muscle, back or extremity pain  Psychiatric: No depression, anxiety, mood swings or difficulty sleeping  Heme/Lymph: No easy bruising or bleeding gums  Allergy and Immunologic: No hives or eczema    PAST MEDICAL & SURGICAL HISTORY:  Aortic disease: leaky valve  Colorectal cancer: 7/2017- last treatment , chemo and radiation  Tracheobronchomalacia  Asthma  Pelvic fracture  Aortic insufficiency: mod/severe AI on echo 9/25/16  Adrenal insufficiency  COPD (chronic obstructive pulmonary disease)  Diabetes: type two. insulin dependent  Atrial fibrillation  H/O pelvic surgery  Exostosis of orbit, left: left eye prosthetic  History of sinus surgery  H/O total knee replacement, bilateral  History of partial hysterectomy      FAMILY HISTORY:  Family history of breast cancer (Sibling)  Family history of asthma      SOCIAL HISTORY:  Smoking Status: [ ] Current, [ ] Former, [ ] Never  Pack Years:    MEDICATIONS:  Pulmonary:    Antimicrobials:    Anticoagulants:    Onc:    GI/:    Endocrine:    Cardiac:    Other Medications:      Allergies    ampicillin (Short breath)  aspirin (Short breath)  Avelox (Short breath)  codeine (Short breath)  Dilaudid (Short breath)  iodine (Short breath)  penicillin (Short breath)  shellfish (Anaphylaxis)  tetanus toxoid (Short breath)  Valium (Short breath)    Intolerances        Vital Signs Last 24 Hrs  T(C): 36.7 (20 Sep 2017 20:14), Max: 37 (20 Sep 2017 15:37)  T(F): 98.1 (20 Sep 2017 20:14), Max: 98.6 (20 Sep 2017 15:37)  HR: 80 (20 Sep 2017 20:14) (72 - 80)  BP: 106/63 (20 Sep 2017 20:14) (106/63 - 135/83)  BP(mean): --  RR: 18 (20 Sep 2017 20:15) (16 - 18)  SpO2: 98% (20 Sep 2017 20:15) (93% - 99%)        PHYSICAL EXAM:    General: Well developed; well nourished; in no acute distress  Eyes: PERRL, EOM intact; conjunctiva and sclera clear  Head: Normocephalic; atraumatic  ENMT: No nasal discharge; airway clear  Neck: Supple; non tender; no masses  Respiratory: Clear to auscultation bilaterally without wheezing, rhonchi or rales  Cardiovascular: Regular rate and rhythm. S1 and S2 Normal; No murmurs, gallops or rubs  Gastrointestinal: Soft non-tender non-distended; Normal bowel sounds; No hepatosplenomegaly  Genitourinary: No costovertebral angle tenderness  Extremities: Normal range of motion, No clubbing, cyanosis or edema  Vascular: Peripheral pulses palpable 2+ bilaterally  Neurological: Alert and oriented x3  Skin: Warm and dry. No obvious rash  Lymph Nodes: No acute cervical or supraclavicular adenopathy  Musculoskeletal: Normal gait, tone, without deformities  Psychiatric: Cooperative and appropriate mood    LABS:      CBC Full  -  ( 20 Sep 2017 17:21 )  WBC Count : 4.6 K/uL  Hemoglobin : 11.5 g/dL  Hematocrit : 36.6 %  Platelet Count - Automated : 221 K/uL  Mean Cell Volume : 78.9 fL  Mean Cell Hemoglobin : 24.8 pg  Mean Cell Hemoglobin Concentration : 31.4 g/dL  Auto Neutrophil # : x  Auto Lymphocyte # : x  Auto Monocyte # : x  Auto Eosinophil # : x  Auto Basophil # : x  Auto Neutrophil % : 58.7 %  Auto Lymphocyte % : 17.5 %  Auto Monocyte % : 19.9 %  Auto Eosinophil % : 3.7 %  Auto Basophil % : 0.2 %    09-20    139  |  100  |  9   ----------------------------<  211<H>  3.8   |  28  |  0.73    Ca    8.9      20 Sep 2017 17:21                        RADIOLOGY & ADDITIONAL STUDIES (The following images were personally reviewed): 66 year old woman with history of asthma, empysema, tracheobronchomalacia s/p tracheobronchoplasty, afib on eliquis, squamous cell ca of anus who presents feeling more dyspneic, with worsened cough and increased sputum production since a bronchoscopy yesterday with thoracic surgery to evaluate the extent of her tracheomalacia.  The patient also states that she had a "fever" after she checked her temperature and it was 100.1.  The patient notes some wheezing as well.  Overall she does not feel well and worse than her  baseline.  Patient denies any chest pain, chills, abdominal pain, leg swelling, hemoptysis, headaches, blurry vision, urinary symptoms, diarrhea.  The patient takes 4mg of methylprednisolone daily for adrenal insuffiency.         REVIEW OF SYSTEMS:  Constitutional: +fevers.  no weight loss  Eyes: No eye pain, visual disturbances, or discharge  ENMT:  No difficulty hearing, tinnitus, vertigo; No sinus or throat pain  Neck: No pain, stiffness or neck swelling  Respiratory: +dyspnea, cough (with change in sputum to now rust colored)  Cardiovascular: No chest pain, palpitations, dizziness or leg swelling  Gastrointestinal: No abdominal or epigastric pain. No nausea, vomiting or hematemesis; No diarrhea or constipation. No melena or hematochezia.  Genitourinary: No dysuria, frequency, hematuria or incontinence  Neurological: No headaches, memory loss, loss of strength, numbness or tremors  Skin: No itching, burning, rashes or lesions   Lymph Nodes: No enlarged glands  Musculoskeletal: No joint pain or swelling; No muscle, back or extremity pain  Allergy and Immunologic: No hives or eczema    PAST MEDICAL & SURGICAL HISTORY:  Aortic disease: leaky valve  Colorectal cancer: 7/2017- last treatment , chemo and radiation  Tracheobronchomalacia  Asthma  Pelvic fracture  Aortic insufficiency: mod/severe AI on echo 9/25/16  Adrenal insufficiency  COPD (chronic obstructive pulmonary disease)  Diabetes: type two. insulin dependent  Atrial fibrillation  H/O pelvic surgery  Exostosis of orbit, left: left eye prosthetic  History of sinus surgery  H/O total knee replacement, bilateral  History of partial hysterectomy      FAMILY HISTORY:  Family history of breast cancer (Sibling)  Family history of asthma      SOCIAL HISTORY:  Smoking Status: Never smoker    MEDICATIONS:  · 	montelukast 10 mg oral tablet: 1 tab(s) orally once a day  · 	Lantus Solostar Pen 100 units/mL subcutaneous solution: 12 unit(s) subcutaneous once a day (at bedtime)  · 	pregabalin 150 mg oral capsule: 1 cap(s) orally 2 times a day  · 	albuterol CFC free 90 mcg/inh inhalation aerosol: 1 puff(s) inhaled every 4 hours, As Needed - for shortness of breath and/or wheezing  · 	pantoprazole 40 mg oral delayed release tablet: 1 tab(s) orally once a day (before a meal)  · 	digoxin 250 mcg (0.25 mg) oral tablet: 1 tab(s) orally once a day  · 	Spiriva 18 mcg inhalation capsule: 1 cap(s) inhaled once a day  · 	Breo Ellipta 200 mcg-25 mcg/inh inhalation powder: 1 puff(s) inhaled once a day  · 	Eliquis 5 mg oral tablet: 1 tab(s) orally 2 times a day  · 	methylPREDNISolone 4 mg oral tablet: orally once a day  · 	Xolair 150 mg subcutaneous injection:   · 	Xyzal 5 mg oral tablet: 1 tab(s) orally once a day (in the evening)      Allergies    ampicillin (Short breath)  aspirin (Short breath)  Avelox (Short breath)  codeine (Short breath)  Dilaudid (Short breath)  iodine (Short breath)  penicillin (Short breath)  shellfish (Anaphylaxis)  tetanus toxoid (Short breath)  Valium (Short breath)    Intolerances        Vital Signs Last 24 Hrs  T(C): 36.7 (20 Sep 2017 20:14), Max: 37 (20 Sep 2017 15:37)  T(F): 98.1 (20 Sep 2017 20:14), Max: 98.6 (20 Sep 2017 15:37)  HR: 80 (20 Sep 2017 20:14) (72 - 80)  BP: 106/63 (20 Sep 2017 20:14) (106/63 - 135/83)  BP(mean): --  RR: 18 (20 Sep 2017 20:15) (16 - 18)  SpO2: 98% (20 Sep 2017 20:15) (93% - 99%)        PHYSICAL EXAM:    General: Well developed; well nourished; in no acute distress  Eyes: PERRL, EOM intact; conjunctiva and sclera clear  Head: Normocephalic; atraumatic  ENMT: No nasal discharge; airway clear  Neck: Supple; non tender; no masses  Respiratory: +wheezing and rhonchi bilaterally and diffusely  Cardiovascular: Regular rate and rhythm. S1 and S2 Normal; No murmurs, gallops or rubs  Gastrointestinal: Soft non-tender non-distended; Normal bowel sounds; No hepatosplenomegaly  Genitourinary: No costovertebral angle tenderness  Extremities: Normal range of motion, No clubbing, cyanosis or edema  Neurological: Alert and oriented x3  Skin: Warm and dry. No obvious rash  Psychiatric: Cooperative and appropriate mood    LABS:      CBC Full  -  ( 20 Sep 2017 17:21 )  WBC Count : 4.6 K/uL  Hemoglobin : 11.5 g/dL  Hematocrit : 36.6 %  Platelet Count - Automated : 221 K/uL  Mean Cell Volume : 78.9 fL  Mean Cell Hemoglobin : 24.8 pg  Mean Cell Hemoglobin Concentration : 31.4 g/dL  Auto Neutrophil # : x  Auto Lymphocyte # : x  Auto Monocyte # : x  Auto Eosinophil # : x  Auto Basophil # : x  Auto Neutrophil % : 58.7 %  Auto Lymphocyte % : 17.5 %  Auto Monocyte % : 19.9 %  Auto Eosinophil % : 3.7 %  Auto Basophil % : 0.2 %    09-20    139  |  100  |  9   ----------------------------<  211<H>  3.8   |  28  |  0.73    Ca    8.9      20 Sep 2017 17:21                        RADIOLOGY & ADDITIONAL STUDIES (The following images were personally reviewed):  CXR: No focal opacity suggesting pneumonia.

## 2017-09-20 NOTE — H&P ADULT - PROBLEM SELECTOR PLAN 6
EKG showing NSR with HR 70s  - c/w home eliquis 5mg BID  - c/w home digoxin 0.25mg qday - c/w abhi q6h  - c/w montelukast  - c/w spiriva - c/w albuterol q4h  - c/w montelukast  - c/w spiriva  - c/w home advair

## 2017-09-20 NOTE — CONSULT NOTE ADULT - ASSESSMENT
66 year old woman with asthma/copd/tracheobronchomalacia presenting with asthma/copd exacerbation s/p bronchoscopy.

## 2017-09-20 NOTE — H&P ADULT - PROBLEM SELECTOR PLAN 7
Eliquis 5mg BID for a.fib EKG showing NSR with HR 70s  - c/w home eliquis 5mg BID  - c/w home digoxin 0.25mg qday

## 2017-09-20 NOTE — ED CLERICAL - NS ED CLERK NOTE PRE-ARRIVAL INFORMATION; ADDITIONAL PRE-ARRIVAL INFORMATION
Shirley Garcia, [birth year], called in by Rhina Coto NP, Fever,had bronchoscopy yesterday, thoracic team was notified, request call thoracic team. (RIVKA)

## 2017-09-20 NOTE — H&P ADULT - HISTORY OF PRESENT ILLNESS
66 yr F with PMH of COPD/asthma, tracheobronchomalacia s/p tracheo-bronchoplasty in 10/16, Afib on Eliquis , Adrenal Insufficieny on steroids, DM2, HTN, Squamous cell CA of the anus on chemo/XRT presents for low grade fever at home s/p bronchoscopy on 9/19 by Dr. Herrera.       In ED, 98.1F, HR 72-80, -135/60-80, RR 18, 93% on RA --> 98% on 2L NC. Given ceftriaxone 1g x1, tylenol 650mg, prednisone 40mg x1. CXR showing increasing density in RLL 66 yr F with PMH of COPD/asthma, tracheobronchomalacia s/p tracheo-bronchoplasty in 10/16, Afib on Eliquis, adrenal Insufficieny on steroids, DM2, HTN, anorectal SCC (last received chemo and radiation on July 2017, now in remission) presents for low grade fever at home s/p bronchoscopy on 9/19 by Dr. Herrera. Pt states that yesterday she went for an elective bronchoscopy after a CT chest showed that was scheduled by Dr. Mcclellan, who she follows with regularly. After the procedure she felt a little nauseous and had chills so she checked her temperature which was 100F. She then took a nap and rechecked her temp which was 100.1F. She called Dr. Mcclellan's PA regarding her temperature and was told to come to the ED. Pt also noticed that she has been having increased brown sputum production after the bronchoscopy. Pt denies fevers, diarrhea, sore throat, myalgias, sick contacts, recent travel hx, SOB, KRAMER, CP.       In ED, 98.1F, HR 72-80, -135/60-80, RR 18, 93% on RA --> 98% on 2L NC. Given ceftriaxone 1g x1, tylenol 650mg, prednisone 40mg x1. CXR showing increasing density in RLL. CT surgery and Pulm were consulted in the ED.

## 2017-09-20 NOTE — ED PROVIDER NOTE - OBJECTIVE STATEMENT
history of tracheomalacia s/p bronchoscopy yesterday, here with increased cough/ sputum/ low grade fever.  States she has adrenal insufficiency and normally runs a low temp but today was up to 100.1.  called her doctor and advised to come to ED.  Denies pain, worsening shortness of breath.  Not currently on any antibiotics.

## 2017-09-20 NOTE — H&P ADULT - NSHPLABSRESULTS_GEN_ALL_CORE
.  LABS:                         11.5   4.6   )-----------( 221      ( 20 Sep 2017 17:21 )             36.6     09-20    139  |  100  |  9   ----------------------------<  211<H>  3.8   |  28  |  0.73    Ca    8.9      20 Sep 2017 17:21        RADIOLOGY, EKG & ADDITIONAL TESTS: Reviewed.   CT Abdomen and Pelvis w/ Oral Cont 09.18.17    IMPRESSION:     1. No bowel obstruction or inflammation.    2. Right lower lobe lung nodule and pleural soft tissue mass unchanged.    3. There are 2 exophyticpancreatic tail cysts unchanged; differentials   are IPMN or cystadenoma. Nonemergent MR scan can be performed.

## 2017-09-20 NOTE — H&P ADULT - PROBLEM SELECTOR PLAN 8
Diabetic, Dash/TLC diet. Replete lytes if k<4, mg<2 Eliquis 5mg BID for a.fib follow up w/ oncologist as outpt

## 2017-09-20 NOTE — H&P ADULT - ASSESSMENT
66 yr F with PMH of COPD/asthma, tracheobronchomalacia s/p tracheo-bronchoplasty in 10/16, Afib on Eliquis , Adrenal Insufficieny on steroids, DM2, HTN, Squamous cell CA of the anus on chemo/XRT presents for low grade fever at home s/p bronchoscopy on 9/19 by Dr. Herrera. 66 yr F with PMH of COPD/asthma, tracheobronchomalacia s/p tracheo-bronchoplasty in 10/16, Afib on Eliquis, adrenal Insufficieny on steroids, DM2, HTN, Squamous cell CA of the anus on chemo/XRT presents for low grade fever at home s/p bronchoscopy on 9/19 by Dr. Herrera, now with concern for possible aspiration PNA.

## 2017-09-20 NOTE — H&P ADULT - NSHPPHYSICALEXAM_GEN_ALL_CORE
.  VITAL SIGNS:  T(C): 36.7 (09-20-17 @ 20:14), Max: 37 (09-20-17 @ 15:37)  T(F): 98.1 (09-20-17 @ 20:14), Max: 98.6 (09-20-17 @ 15:37)  HR: 80 (09-20-17 @ 20:14) (72 - 80)  BP: 106/63 (09-20-17 @ 20:14) (106/63 - 135/83)  BP(mean): --  RR: 18 (09-20-17 @ 20:15) (16 - 18)  SpO2: 98% (09-20-17 @ 20:15) (93% - 99%)  Wt(kg): --    PHYSICAL EXAM:    Constitutional: WDWN resting comfortably in bed; NAD  Head: NC/AT  Eyes: PERRL, EOMI, anicteric sclera  ENT: no nasal discharge; uvula midline, no oropharyngeal erythema or exudates; MMM  Neck: supple; no JVD or thyromegaly  Respiratory:   Cardiac:   Gastrointestinal: soft, NT/ND; no rebound or guarding; +BSx4  Back: spine midline, no bony tenderness or step-offs; no CVAT B/L  Extremities: WWP, no clubbing or cyanosis; no peripheral edema  Musculoskeletal: NROM x4; no joint swelling, tenderness or erythema  Vascular: 2+ radial, femoral, DP/PT pulses B/L  Dermatologic: skin warm, dry and intact; no rashes, wounds, or scars  Lymphatic: no submandibular or cervical LAD  Neurologic: AAOx3; CNII-XII grossly intact; no focal deficits .  VITAL SIGNS:  T(C): 36.7 (09-20-17 @ 20:14), Max: 37 (09-20-17 @ 15:37)  T(F): 98.1 (09-20-17 @ 20:14), Max: 98.6 (09-20-17 @ 15:37)  HR: 80 (09-20-17 @ 20:14) (72 - 80)  BP: 106/63 (09-20-17 @ 20:14) (106/63 - 135/83)  BP(mean): --  RR: 18 (09-20-17 @ 20:15) (16 - 18)  SpO2: 98% (09-20-17 @ 20:15) (93% - 99%)  Wt(kg): --    PHYSICAL EXAM:    Constitutional: WDWN resting comfortably in bed; NAD. Speaking in full sentences without trouble   Head: NC/AT  Eyes: PERRL, EOMI, anicteric sclera  ENT: no nasal discharge; uvula midline, no oropharyngeal erythema or exudates; MMM  Neck: supple; no JVD or thyromegaly  Respiratory: Diffuse rhonchi  Cardiac: distant heart sounds, RRR, 3/6 diastolic murmur at RUSB  Gastrointestinal: soft, NT/ND; no rebound or guarding; +BSx4  Back: spine midline, no bony tenderness or step-offs; no CVAT B/L  Extremities: WWP, no clubbing or cyanosis; no peripheral edema  Musculoskeletal: NROM x4; no joint swelling, tenderness or erythema  Vascular: 2+ radial, femoral, DP/PT pulses B/L  Dermatologic: skin warm, dry and intact; no rashes, wounds, or scars  Lymphatic: no submandibular or cervical LAD  Neurologic: AAOx3; CNII-XII grossly intact; no focal deficits .  VITAL SIGNS:  T(C): 36.7 (09-20-17 @ 20:14), Max: 37 (09-20-17 @ 15:37)  T(F): 98.1 (09-20-17 @ 20:14), Max: 98.6 (09-20-17 @ 15:37)  HR: 80 (09-20-17 @ 20:14) (72 - 80)  BP: 106/63 (09-20-17 @ 20:14) (106/63 - 135/83)  BP(mean): --  RR: 18 (09-20-17 @ 20:15) (16 - 18)  SpO2: 98% (09-20-17 @ 20:15) (93% - 99%)  Wt(kg): --    PHYSICAL EXAM:    Constitutional: WDWN resting comfortably in bed; NAD. Speaking in full sentences without distress, no use of accessory muscles, no respiratory distress.   Head: NC/AT  Eyes: PERRL, EOMI, anicteric sclera  ENT: no nasal discharge; uvula midline, no oropharyngeal erythema or exudates; MMM  Neck: supple; no JVD or thyromegaly  Respiratory: Diffuse rhonchi  Cardiac: distant heart sounds, RRR, 3/6 diastolic murmur at RUSB  Gastrointestinal: soft, NT/ND; no rebound or guarding; +BSx4  Back: spine midline, no bony tenderness or step-offs; no CVAT B/L  Extremities: WWP, no clubbing or cyanosis; no peripheral edema  Musculoskeletal: NROM x4; no joint swelling, tenderness or erythema  Vascular: 2+ radial, femoral, DP/PT pulses B/L  Dermatologic: skin warm, dry and intact; no rashes, wounds, or scars  Lymphatic: no submandibular or cervical LAD  Neurologic: AAOx3; CNII-XII grossly intact; no focal deficits .  VITAL SIGNS:  T(C): 36.7 (09-20-17 @ 20:14), Max: 37 (09-20-17 @ 15:37)  T(F): 98.1 (09-20-17 @ 20:14), Max: 98.6 (09-20-17 @ 15:37)  HR: 80 (09-20-17 @ 20:14) (72 - 80)  BP: 106/63 (09-20-17 @ 20:14) (106/63 - 135/83)  BP(mean): --  RR: 18 (09-20-17 @ 20:15) (16 - 18)  SpO2: 98% (09-20-17 @ 20:15) (93% - 99%)  Wt(kg): --    PHYSICAL EXAM:    Constitutional: WDWN resting comfortably in bed; NAD. Speaking in full sentences without distress, no use of accessory muscles, no respiratory distress.   Head: NC/AT  Eyes: PERRL, EOMI, anicteric sclera  ENT: no nasal discharge; uvula midline, no oropharyngeal erythema or exudates; MMM  Neck: supple; no JVD or thyromegaly  Respiratory: Diffuse rhonchi  Chest: chemo port located at right upper chest, no erythema surrounding area or tenderness  Cardiac: distant heart sounds, RRR, 3/6 diastolic murmur at RUSB  Gastrointestinal: soft, NT/ND; no rebound or guarding; +BSx4  Back: spine midline, no bony tenderness or step-offs; no CVAT B/L  Extremities: WWP, no clubbing or cyanosis; no peripheral edema  Musculoskeletal: NROM x4; no joint swelling, tenderness or erythema  Vascular: 2+ radial, femoral, DP/PT pulses B/L  Dermatologic: skin warm, dry and intact; no rashes, wounds, or scars  Lymphatic: no submandibular or cervical LAD  Neurologic: AAOx3; CNII-XII grossly intact; no focal deficits

## 2017-09-20 NOTE — ED ADULT NURSE NOTE - PMH
Adrenal insufficiency    Aortic disease  leaky valve  Aortic insufficiency  mod/severe AI on echo 9/25/16  Asthma    Atrial fibrillation    Colorectal cancer  7/2017- last treatment , chemo and radiation  COPD (chronic obstructive pulmonary disease)    Diabetes  type two. insulin dependent  Pelvic fracture    Tracheobronchomalacia

## 2017-09-20 NOTE — ED PROVIDER NOTE - MEDICAL DECISION MAKING DETAILS
worsening cough post bronchoscopy.  seen by ct surgery and pulmonary.  culture growing gnr.  admit for iv antibiotic/prednisone/ further eval

## 2017-09-20 NOTE — H&P ADULT - PROBLEM SELECTOR PLAN 5
- c/w abhi q6h  - c/w montelukast  - c/w spiriva Lorenzo c/w abhi q6 Pt has h/o adrenal insufficiency, currently on methylprednisolone 4mg qday  - in ED, was given 40mg prednisone   - will c/w prednisone 40mg qday for asthma/COPD exacerbation   - if pt becomes septic or clinically worsens, will give stress-dose steroids

## 2017-09-20 NOTE — ED PROVIDER NOTE - FAMILY HISTORY
Family history of asthma     Sibling  Still living? Unknown  Family history of breast cancer, Age at diagnosis: Age Unknown

## 2017-09-20 NOTE — H&P ADULT - ATTENDING COMMENTS
pt seen and examined  reviewed vs, labs, cxr, ekg  pt a/f aspiration PNA s/p bronchoscopy  agree w/ PE findings as above  a/p:   1. aspiration PNA: on ceftriaxone; follow up bronchial ctxs; follow up pulm and CT surgery recs  2. asthma: plan as above  3. adrenal insufficiency: on 40mg prednsione given asthma sxs

## 2017-09-20 NOTE — H&P ADULT - PROBLEM SELECTOR PLAN 2
Pt w/ history of tracheobronchomalacia s/p tracheo-bronchoplasty in 10/16  - stable, NTD Pt with asthma exacerbation most likely precipitated by aspiration PNA  - c/w duonebs q6h   - c/w prednisone 40mg qday  - c/w home spiriva, montelukast Pt with asthma exacerbation most likely precipitated by aspiration PNA  - wean oxygen as tolerates  - c/w duonebs q6h   - c/w prednisone 40mg qday  - c/w home spiriva, montelukast Pt with asthma exacerbation most likely precipitated by aspiration PNA  - wean oxygen as tolerates  - c/w albuterol q4h per pulm recs   - c/w prednisone 40mg qday  - c/w home spiriva, montelukast, advair

## 2017-09-20 NOTE — ED ADULT NURSE NOTE - OBJECTIVE STATEMENT
Pt had a bronchoscopy yesterday and woke up today with a low grade fever of 100.1F and productive cough. Pt was told to come in bc bacteria was seen in lungs during procedure. Pt stated she has 30% function of her bronchi and Hx tracheomalacia. NAD. Speaking in clear full sentences. Pt has a port right anterior chest.

## 2017-09-20 NOTE — H&P ADULT - PROBLEM SELECTOR PLAN 3
- c/w home lantus 12 units qHs  - start medium ISS - pt is rx'ed lantus 12 units qHs, but has not been taking this insulin dose because she adjusts her insulin based on carb counting and FS's  - will start medium ISS and hold bedtime lantus; will add on bedtime lantus if needed - c/w home 12 units lantus qHs   - will start medium ISS

## 2017-09-20 NOTE — H&P ADULT - PROBLEM SELECTOR PLAN 4
Lorenzo c/w abhi q6 Pt has h/o adrenal insufficiency, currently on methylprednisolone 4mg qday  - in ED, was given 40mg prednisone   - c/w home methylprednisolone 4mg qday  - if pt becomes septic or clinically worsens, will give stress-dose steroids Pt w/ history of tracheobronchomalacia s/p tracheo-bronchoplasty in 10/16  - stable, NTD

## 2017-09-20 NOTE — ED PROVIDER NOTE - PROGRESS NOTE DETAILS
seen by ct surgery, rec eval by pulmonary to see if needs admit.  seen by pulmonary, rec iv antibiotic (ceftriaxone) and prednisone 40mg po, admit to medicine for further eval

## 2017-09-21 DIAGNOSIS — J45.901 UNSPECIFIED ASTHMA WITH (ACUTE) EXACERBATION: ICD-10-CM

## 2017-09-21 DIAGNOSIS — C20 MALIGNANT NEOPLASM OF RECTUM: ICD-10-CM

## 2017-09-21 DIAGNOSIS — R50.9 FEVER, UNSPECIFIED: ICD-10-CM

## 2017-09-21 LAB
ANION GAP SERPL CALC-SCNC: 13 MMOL/L — SIGNIFICANT CHANGE UP (ref 5–17)
BUN SERPL-MCNC: 13 MG/DL — SIGNIFICANT CHANGE UP (ref 7–23)
CALCIUM SERPL-MCNC: 9 MG/DL — SIGNIFICANT CHANGE UP (ref 8.4–10.5)
CHLORIDE SERPL-SCNC: 101 MMOL/L — SIGNIFICANT CHANGE UP (ref 96–108)
CO2 SERPL-SCNC: 25 MMOL/L — SIGNIFICANT CHANGE UP (ref 22–31)
CREAT SERPL-MCNC: 0.71 MG/DL — SIGNIFICANT CHANGE UP (ref 0.5–1.3)
GLUCOSE SERPL-MCNC: 241 MG/DL — HIGH (ref 70–99)
HCT VFR BLD CALC: 40.5 % — SIGNIFICANT CHANGE UP (ref 34.5–45)
HCV AB S/CO SERPL IA: 0.07 S/CO — SIGNIFICANT CHANGE UP
HCV AB SERPL-IMP: SIGNIFICANT CHANGE UP
HGB BLD-MCNC: 12.6 G/DL — SIGNIFICANT CHANGE UP (ref 11.5–15.5)
INR BLD: 1.07 — SIGNIFICANT CHANGE UP (ref 0.88–1.16)
MAGNESIUM SERPL-MCNC: 2.1 MG/DL — SIGNIFICANT CHANGE UP (ref 1.6–2.6)
MCHC RBC-ENTMCNC: 24.8 PG — LOW (ref 27–34)
MCHC RBC-ENTMCNC: 31.1 G/DL — LOW (ref 32–36)
MCV RBC AUTO: 79.7 FL — LOW (ref 80–100)
PLATELET # BLD AUTO: 228 K/UL — SIGNIFICANT CHANGE UP (ref 150–400)
POTASSIUM SERPL-MCNC: 4.5 MMOL/L — SIGNIFICANT CHANGE UP (ref 3.5–5.3)
POTASSIUM SERPL-SCNC: 4.5 MMOL/L — SIGNIFICANT CHANGE UP (ref 3.5–5.3)
PROTHROM AB SERPL-ACNC: 11.9 SEC — SIGNIFICANT CHANGE UP (ref 9.8–12.7)
RBC # BLD: 5.08 M/UL — SIGNIFICANT CHANGE UP (ref 3.8–5.2)
RBC # FLD: 14.6 % — SIGNIFICANT CHANGE UP (ref 10.3–16.9)
SODIUM SERPL-SCNC: 139 MMOL/L — SIGNIFICANT CHANGE UP (ref 135–145)
WBC # BLD: 4.5 K/UL — SIGNIFICANT CHANGE UP (ref 3.8–10.5)
WBC # FLD AUTO: 4.5 K/UL — SIGNIFICANT CHANGE UP (ref 3.8–10.5)

## 2017-09-21 PROCEDURE — 99232 SBSQ HOSP IP/OBS MODERATE 35: CPT

## 2017-09-21 PROCEDURE — 71010: CPT | Mod: 26

## 2017-09-21 PROCEDURE — 99233 SBSQ HOSP IP/OBS HIGH 50: CPT

## 2017-09-21 RX ORDER — TIOTROPIUM BROMIDE 18 UG/1
1 CAPSULE ORAL; RESPIRATORY (INHALATION) DAILY
Qty: 0 | Refills: 0 | Status: DISCONTINUED | OUTPATIENT
Start: 2017-09-21 | End: 2017-09-23

## 2017-09-21 RX ORDER — INFLUENZA VIRUS VACCINE 15; 15; 15; 15 UG/.5ML; UG/.5ML; UG/.5ML; UG/.5ML
0.5 SUSPENSION INTRAMUSCULAR ONCE
Qty: 0 | Refills: 0 | Status: DISCONTINUED | OUTPATIENT
Start: 2017-09-21 | End: 2017-09-23

## 2017-09-21 RX ORDER — IPRATROPIUM/ALBUTEROL SULFATE 18-103MCG
3 AEROSOL WITH ADAPTER (GRAM) INHALATION EVERY 4 HOURS
Qty: 0 | Refills: 0 | Status: DISCONTINUED | OUTPATIENT
Start: 2017-09-21 | End: 2017-09-21

## 2017-09-21 RX ORDER — INSULIN GLARGINE 100 [IU]/ML
12 INJECTION, SOLUTION SUBCUTANEOUS AT BEDTIME
Qty: 0 | Refills: 0 | Status: DISCONTINUED | OUTPATIENT
Start: 2017-09-21 | End: 2017-09-22

## 2017-09-21 RX ORDER — CEFTRIAXONE 500 MG/1
1 INJECTION, POWDER, FOR SOLUTION INTRAMUSCULAR; INTRAVENOUS EVERY 24 HOURS
Qty: 0 | Refills: 0 | Status: DISCONTINUED | OUTPATIENT
Start: 2017-09-21 | End: 2017-09-22

## 2017-09-21 RX ORDER — INFLUENZA VIRUS VACCINE 15; 15; 15; 15 UG/.5ML; UG/.5ML; UG/.5ML; UG/.5ML
0.5 SUSPENSION INTRAMUSCULAR ONCE
Qty: 0 | Refills: 0 | Status: DISCONTINUED | OUTPATIENT
Start: 2017-09-21 | End: 2017-09-21

## 2017-09-21 RX ADMIN — Medication 4: at 12:49

## 2017-09-21 RX ADMIN — Medication 4: at 09:12

## 2017-09-21 RX ADMIN — Medication 4: at 00:55

## 2017-09-21 RX ADMIN — INSULIN GLARGINE 12 UNIT(S): 100 INJECTION, SOLUTION SUBCUTANEOUS at 21:53

## 2017-09-21 RX ADMIN — Medication 3 MILLILITER(S): at 09:03

## 2017-09-21 RX ADMIN — PANTOPRAZOLE SODIUM 40 MILLIGRAM(S): 20 TABLET, DELAYED RELEASE ORAL at 06:08

## 2017-09-21 RX ADMIN — Medication 6: at 21:55

## 2017-09-21 RX ADMIN — Medication 40 MILLIGRAM(S): at 06:08

## 2017-09-21 RX ADMIN — Medication 150 MILLIGRAM(S): at 17:04

## 2017-09-21 RX ADMIN — Medication 6: at 17:56

## 2017-09-21 RX ADMIN — Medication 3 MILLILITER(S): at 06:41

## 2017-09-21 RX ADMIN — ONDANSETRON 4 MILLIGRAM(S): 8 TABLET, FILM COATED ORAL at 09:03

## 2017-09-21 RX ADMIN — APIXABAN 5 MILLIGRAM(S): 2.5 TABLET, FILM COATED ORAL at 17:03

## 2017-09-21 RX ADMIN — ALBUTEROL 2 PUFF(S): 90 AEROSOL, METERED ORAL at 06:08

## 2017-09-21 RX ADMIN — Medication 3 MILLILITER(S): at 17:04

## 2017-09-21 RX ADMIN — TIOTROPIUM BROMIDE 1 CAPSULE(S): 18 CAPSULE ORAL; RESPIRATORY (INHALATION) at 22:15

## 2017-09-21 RX ADMIN — MONTELUKAST 10 MILLIGRAM(S): 4 TABLET, CHEWABLE ORAL at 09:03

## 2017-09-21 RX ADMIN — CEFTRIAXONE 100 GRAM(S): 500 INJECTION, POWDER, FOR SOLUTION INTRAMUSCULAR; INTRAVENOUS at 19:54

## 2017-09-21 RX ADMIN — APIXABAN 5 MILLIGRAM(S): 2.5 TABLET, FILM COATED ORAL at 06:08

## 2017-09-21 RX ADMIN — Medication 0.25 MILLIGRAM(S): at 06:08

## 2017-09-21 RX ADMIN — Medication 3 MILLILITER(S): at 14:21

## 2017-09-21 RX ADMIN — LORATADINE 10 MILLIGRAM(S): 10 TABLET ORAL at 09:03

## 2017-09-21 RX ADMIN — Medication 150 MILLIGRAM(S): at 06:41

## 2017-09-21 NOTE — PROGRESS NOTE ADULT - PROBLEM SELECTOR PLAN 8
follow up w/ oncologist as outpatient  -CT Abd/Pelvis on 9/20 noted 2 exophytic pancreatic tail cysts unchanged; can be worked up in outpatient setting with MRI.

## 2017-09-21 NOTE — PROGRESS NOTE ADULT - PROBLEM SELECTOR PLAN 3
- c/w home 12 units lantus qHs   - will start medium ISS -FS >200, likely secondary to PO prednisone patient is on.   - c/w home 12 units lantus qHs   - c/w medium ISS

## 2017-09-21 NOTE — PROGRESS NOTE ADULT - PROBLEM SELECTOR PLAN 2
Pt with asthma exacerbation most likely precipitated by aspiration PNA  - wean oxygen as tolerates  - c/w albuterol q4h per pulm recs   - c/w prednisone 40mg qday  - c/w home spiriva, montelukast, advair Pt with asthma exacerbation most likely precipitated by aspiration PNA  - Patient has been on 2L NC overnight. Patient initially presented with Sp02 of 93%, put on 2L NC with improvement to 98%. Weaned off 02 today and saturating well at 96%.  - c/w albuterol q4h per pulm recs   - c/w prednisone 40mg qday  - c/w home spiriva, montelukast, advair

## 2017-09-21 NOTE — PROGRESS NOTE ADULT - PROBLEM SELECTOR PLAN 4
Pt w/ history of tracheobronchomalacia s/p tracheo-bronchoplasty in 10/16  - stable, NTD Pt w/ history of tracheobronchomalacia s/p tracheo-bronchoplasty in 10/16  - stable, NTD  -CT surgery following

## 2017-09-21 NOTE — PROGRESS NOTE ADULT - PROBLEM SELECTOR PLAN 1
Likely an overlap syndrome of asthma and copd.  The patient has worsening dyspnea, wheezing, and increase/change in sputum from baseline after bronchoscopy.  - Recommend switching Abx to levaquin. Patient is clinically stable and in no acute distress  -Start prednisone 40mg orally daily and taper slowly. She is on 4mg prednisone daily at home.  -Albuterol q4 standing for now.  -Continue spiriva, breo.   - To continue Xolair and singulair  - PFT consistent with moderate obstructive defect.  -Monitor peak flow.  -OOB to chair and encourage ambulation.  -Continue anticoagulation.  - Pulmonary to sign off, please call us with any questions.

## 2017-09-21 NOTE — CONSULT NOTE ADULT - SUBJECTIVE AND OBJECTIVE BOX
Surgeon: Zohaib Harris    Requesting Physician: ED attending    HISTORY OF PRESENT ILLNESS:  This is a 67 y/o female with PMHx of COPD/asthma, tracheobronchomalacia s/p tracheobronchoplasty on 10/25/16 via right VATS, Afib on Eliquis, adrenal Insufficieny on steroids, IDDM, HTN, anorectal SCC (last received chemo and radiation on July 2017, now in remission) who underwent a follow-up bronchoscopy by Dr. Herrera on 9/19/17 for on-going secretions and to obtain a BAL for analysis.  She stated that she felt feverish yesterday and took her temperature which was 100, and stated "that is really high for me".  She called Dr. Zapien who told her to go to the ED.  She tells me that she has been having some upper/mid abdomen vs. lower chest "discomfort" as of lately, along with some KRAMER after exerting herself and now 2-3 pillow orthopnea.  She has known "leaky aortic valve" and is supposed to get a repeat echo in about 6 months with a stress test.  She had a SAFIA here on 9/8/17, results listed below.  Also states she is currently nauseated, and has 'on-going nausea that's been going on forever'.  Currently she denies any CP, SOB, palpitations, fever or chills.  Denies any productive cough at this time.        In ED, 98.1F, HR 72-80, -135/60-80, RR 18, 93% on RA --> 98% on 2L NC. Given ceftriaxone 1g x1, tylenol 650mg, prednisone 40mg x1. CXR showing increasing density in RLL. CT surgery and Pulm were consulted in the ED.     PAST MEDICAL & SURGICAL HISTORY:  Aortic disease: leaky valve  Colorectal cancer: 7/2017- last treatment , chemo and radiation  Tracheobronchomalacia  Asthma  Pelvic fracture  Aortic insufficiency: mod/severe AI on echo 9/25/16  Adrenal insufficiency  COPD (chronic obstructive pulmonary disease)  Diabetes: type two. insulin dependent  Atrial fibrillation  H/O pelvic surgery  Exostosis of orbit, left: left eye prosthetic  History of sinus surgery  H/O total knee replacement, bilateral  History of partial hysterectomy      MEDICATIONS  (STANDING):  cefTRIAXone   IVPB 1 Gram(s) IV Intermittent every 24 hours  digoxin     Tablet 0.25 milliGRAM(s) Oral daily  apixaban 5 milliGRAM(s) Oral every 12 hours  pregabalin 150 milliGRAM(s) Oral two times a day  montelukast 10 milliGRAM(s) Oral daily  pantoprazole    Tablet 40 milliGRAM(s) Oral before breakfast  insulin lispro (HumaLOG) corrective regimen sliding scale   SubCutaneous Before meals and at bedtime  dextrose 5%. 1000 milliLiter(s) (50 mL/Hr) IV Continuous <Continuous>  dextrose 50% Injectable 12.5 Gram(s) IV Push once  dextrose 50% Injectable 25 Gram(s) IV Push once  dextrose 50% Injectable 25 Gram(s) IV Push once  predniSONE   Tablet 40 milliGRAM(s) Oral daily  loratadine 10 milliGRAM(s) Oral daily  ALBUTerol    90 MICROgram(s) HFA Inhaler 2 Puff(s) Inhalation every 4 hours  insulin glargine Injectable (LANTUS) 12 Unit(s) SubCutaneous at bedtime  influenza  Vaccine (HIGH DOSE) 0.5 milliLiter(s) IntraMuscular once  ALBUTerol/ipratropium for Nebulization 3 milliLiter(s) Nebulizer every 4 hours    MEDICATIONS  (PRN):  acetaminophen   Tablet 650 milliGRAM(s) Oral every 6 hours PRN For Temp greater than 38 C (100.4 F)  dextrose Gel 1 Dose(s) Oral once PRN Blood Glucose LESS THAN 70 milliGRAM(s)/deciliter  glucagon  Injectable 1 milliGRAM(s) IntraMuscular once PRN Glucose LESS THAN 70 milligrams/deciliter      Allergies    ampicillin (Short breath)  aspirin (Short breath)  Avelox (Short breath)  codeine (Short breath)  Dilaudid (Short breath)  iodine (Short breath)  penicillin (Short breath)  shellfish (Anaphylaxis)  tetanus toxoid (Short breath)  Valium (Short breath)    Intolerances        SOCIAL HISTORY:  Denies smoking, ETOH or drug abuse history.     FAMILY HISTORY:  Family history of breast cancer (Sibling)  Family history of asthma      Review of Systems  CONSTITUTIONAL:  Fevers / chills, sweats, fatigue, weight loss, weight gain                                    NEGATIVE  NEURO:  parathesias, seizures, syncope, confusion                                                                               NEGATIVE  EYES:  Blurry vision, discharge, pain, loss of vision                                                                                  NEGATIVE  ENMT:  Difficulty hearing, vertigo, dysphagia, epistaxis, recent dental work                                     NEGATIVE  CV:  +KRAMER.  +Chest pain, -palpitations, +orthopnea                                                                                        POSITIVE  RESPIRATORY:  Wheezing, SOB, cough / sputum, hemoptysis                                                              NEGATIVE  GI:  +Nausea, vommiting, diarrhea, constipation, melena                                                                       POSITIVE  : Hematuria, dysuria, urgency, incontinence                                                                                       NEGATIVE  MUSKULOSKELETAL:  arthritis, joint swelling, muscle weakness                                                           NEGATIVE  SKIN/BREAST:  rash, itching, paloma loss, masses                                                                                            NEGATIVE  PSYCH:  depresion, anxiety, suicidal ideation                                                                                             NEGATIVE  HEME/LYMPH:  bruises easily, enlarged lymph nodes, tender lymph nodes                                        NEGATIVE  ENDOCRINE:  cold intolerance, heat intolerance, polydipsia                                                                   NEGATIVE    PHYSICAL EXAM  Vital Signs Last 24 Hrs  T(C): 36.2 (21 Sep 2017 09:20), Max: 37 (20 Sep 2017 15:37)  T(F): 97.1 (21 Sep 2017 09:20), Max: 98.6 (20 Sep 2017 15:37)  HR: 67 (21 Sep 2017 09:20) (59 - 80)  BP: 133/77 (21 Sep 2017 09:20) (106/63 - 135/83)  BP(mean): --  RR: 18 (21 Sep 2017 09:20) (16 - 20)  SpO2: 97% (21 Sep 2017 09:20) (93% - 99%)    CONSTITUTIONAL:                                                                          NAD, lying in bed.    NEURO:                                                                                             No focal deficits                  EYES:                                                                                                  WNL  ENMT:                                                                                                WNL  CV:                                                                                                  S1S2 +diastolic murmur heard at LSB and apex  RESPIRATORY:                                                                                 No wheezing or rales heard  GI:                                                                                                       Soft, non tender, non distended  : GERMAIN + / -                                                                           No  MUSKULOSKELETAL:                                                                     Moves all extremities grossly  SKIN / BREAST:                                                                                 WNL  PV: PT/DP 2+ palpable bilaterally                                                          LABS:                        12.6   4.5   )-----------( 228      ( 21 Sep 2017 06:10 )             40.5     09-21    139  |  101  |  13  ----------------------------<  241<H>  4.5   |  25  |  0.71    Ca    9.0      21 Sep 2017 06:07  Mg     2.1     09-21      PT/INR - ( 21 Sep 2017 06:11 )   PT: 11.9 sec;   INR: 1.07            RAdiologist & ADDITIONAL STUDIES:  CAROTID U/S:    CXR:    CT Scan:    EKG:    TTE / SAFIA:    Cardiac Cath: Surgeon: Zohaib Harris    Requesting Physician: ED attending    HISTORY OF PRESENT ILLNESS:  This is a 67 y/o female with PMHx of COPD/asthma, tracheobronchomalacia s/p tracheobronchoplasty on 10/25/16 via right VATS, Afib on Eliquis, adrenal Insufficieny on steroids, IDDM, HTN, anorectal SCC (last received chemo and radiation on July 2017, now in remission) who underwent a follow-up bronchoscopy by Dr. Herrera on 9/19/17 for on-going secretions and to obtain a BAL for analysis.  She stated that she felt feverish yesterday and took her temperature which was 100, and stated "that is really high for me".  She called Dr. Zapien who told her to go to the ED.  She tells me that she has been having some upper/mid abdomen vs. lower chest "discomfort" as of lately, along with some KRAMER after exerting herself and now 2-3 pillow orthopnea.  She has known "leaky aortic valve" and is supposed to get a repeat echo in about 6 months with a stress test.  She had a SAFIA here on 9/8/17, results listed below.  Also states she is currently nauseated, and has 'on-going nausea that's been going on forever'.  Currently she denies any CP, SOB, palpitations, fever or chills.  Denies any productive cough at this time.        In ED, 98.1F, HR 72-80, -135/60-80, RR 18, 93% on RA --> 98% on 2L NC. Given ceftriaxone 1g x1, tylenol 650mg, prednisone 40mg x1. CXR showing increasing density in RLL. CT surgery and Pulm were consulted in the ED.     PAST MEDICAL & SURGICAL HISTORY:  Aortic disease: leaky valve  Colorectal cancer: 7/2017- last treatment , chemo and radiation  Tracheobronchomalacia  Asthma  Pelvic fracture  Aortic insufficiency: mod/severe AI on echo 9/25/16  Adrenal insufficiency  COPD (chronic obstructive pulmonary disease)  Diabetes: type two. insulin dependent  Atrial fibrillation  H/O pelvic surgery  Exostosis of orbit, left: left eye prosthetic  History of sinus surgery  H/O total knee replacement, bilateral  History of partial hysterectomy      MEDICATIONS  (STANDING):  cefTRIAXone   IVPB 1 Gram(s) IV Intermittent every 24 hours  digoxin     Tablet 0.25 milliGRAM(s) Oral daily  apixaban 5 milliGRAM(s) Oral every 12 hours  pregabalin 150 milliGRAM(s) Oral two times a day  montelukast 10 milliGRAM(s) Oral daily  pantoprazole    Tablet 40 milliGRAM(s) Oral before breakfast  insulin lispro (HumaLOG) corrective regimen sliding scale   SubCutaneous Before meals and at bedtime  dextrose 5%. 1000 milliLiter(s) (50 mL/Hr) IV Continuous <Continuous>  dextrose 50% Injectable 12.5 Gram(s) IV Push once  dextrose 50% Injectable 25 Gram(s) IV Push once  dextrose 50% Injectable 25 Gram(s) IV Push once  predniSONE   Tablet 40 milliGRAM(s) Oral daily  loratadine 10 milliGRAM(s) Oral daily  ALBUTerol    90 MICROgram(s) HFA Inhaler 2 Puff(s) Inhalation every 4 hours  insulin glargine Injectable (LANTUS) 12 Unit(s) SubCutaneous at bedtime  influenza  Vaccine (HIGH DOSE) 0.5 milliLiter(s) IntraMuscular once  ALBUTerol/ipratropium for Nebulization 3 milliLiter(s) Nebulizer every 4 hours    MEDICATIONS  (PRN):  acetaminophen   Tablet 650 milliGRAM(s) Oral every 6 hours PRN For Temp greater than 38 C (100.4 F)  dextrose Gel 1 Dose(s) Oral once PRN Blood Glucose LESS THAN 70 milliGRAM(s)/deciliter  glucagon  Injectable 1 milliGRAM(s) IntraMuscular once PRN Glucose LESS THAN 70 milligrams/deciliter      Allergies    ampicillin (Short breath)  aspirin (Short breath)  Avelox (Short breath)  codeine (Short breath)  Dilaudid (Short breath)  iodine (Short breath)  penicillin (Short breath)  shellfish (Anaphylaxis)  tetanus toxoid (Short breath)  Valium (Short breath)    Intolerances        SOCIAL HISTORY:  Denies smoking, ETOH or drug abuse history.     FAMILY HISTORY:  Family history of breast cancer (Sibling)  Family history of asthma      Review of Systems  CONSTITUTIONAL:  Fevers / chills, sweats, fatigue, weight loss, weight gain                                    NEGATIVE  NEURO:  parathesias, seizures, syncope, confusion                                                                               NEGATIVE  EYES:  Blurry vision, discharge, pain, loss of vision                                                                                  NEGATIVE  ENMT:  Difficulty hearing, vertigo, dysphagia, epistaxis, recent dental work                                     NEGATIVE  CV:  +KRAMER.  +Chest pain, -palpitations, +orthopnea                                                                                        POSITIVE  RESPIRATORY:  Wheezing, SOB, cough / sputum, hemoptysis                                                              NEGATIVE  GI:  +Nausea, vommiting, diarrhea, constipation, melena                                                                       POSITIVE  : Hematuria, dysuria, urgency, incontinence                                                                                       NEGATIVE  MUSKULOSKELETAL:  arthritis, joint swelling, muscle weakness                                                           NEGATIVE  SKIN/BREAST:  rash, itching, paloma loss, masses                                                                                            NEGATIVE  PSYCH:  depresion, anxiety, suicidal ideation                                                                                             NEGATIVE  HEME/LYMPH:  bruises easily, enlarged lymph nodes, tender lymph nodes                                        NEGATIVE  ENDOCRINE:  cold intolerance, heat intolerance, polydipsia                                                                   NEGATIVE    PHYSICAL EXAM  Vital Signs Last 24 Hrs  T(C): 36.2 (21 Sep 2017 09:20), Max: 37 (20 Sep 2017 15:37)  T(F): 97.1 (21 Sep 2017 09:20), Max: 98.6 (20 Sep 2017 15:37)  HR: 67 (21 Sep 2017 09:20) (59 - 80)  BP: 133/77 (21 Sep 2017 09:20) (106/63 - 135/83)  BP(mean): --  RR: 18 (21 Sep 2017 09:20) (16 - 20)  SpO2: 97% (21 Sep 2017 09:20) (93% - 99%)    CONSTITUTIONAL:                                                                          NAD, lying in bed.    NEURO:                                                                                             No focal deficits                  EYES:                                                                                                  WNL  ENMT:                                                                                                WNL  CV:                                                                                                  S1S2 +diastolic murmur heard at LSB and apex  RESPIRATORY:                                                                                GI:                                                                                                       Soft, non tender, non distended  : GERMAIN + / -                                                                           No  MUSKULOSKELETAL:                                                                     Moves all extremities grossly  SKIN / BREAST:                                                                                 WNL  PV: PT/DP 2+ palpable bilaterally                                                          LABS:                        12.6   4.5   )-----------( 228      ( 21 Sep 2017 06:10 )             40.5     09-21    139  |  101  |  13  ----------------------------<  241<H>  4.5   |  25  |  0.71    Ca    9.0      21 Sep 2017 06:07  Mg     2.1     09-21      PT/INR - ( 21 Sep 2017 06:11 )   PT: 11.9 sec;   INR: 1.07            RAdiologist & ADDITIONAL STUDIES:      CXR:< from: Xray Chest 1 View AP- PORTABLE-Urgent (09.20.17 @ 17:28) >  FINDINGS: There is an injection port catheter the tip located the   superior cavoatrial junction. Heart size, mediastinal and hilar contours   are unchanged. Again noted is cardiomegaly. No consolidation or pleural   collections. No pneumothorax.    CT Scan:< from: CT Chest No Cont (08.16.17 @ 16:06) >  IMPRESSION: No evidence of tracheomalacia. Flattening of the mainstem   bronchi by up to 70%.    3 mm right upper lobe pulmonary nodule new since April 4, 2017. A   three-month follow-up chest CT can be performed for further evaluation   given the history of cancer.    Emphysema    EKG:<NSR at 62 BPM    SAFIA< from: Transesophageal Echocardiogram (09.08.17 @ 11:36) >  ------------------------------------------------------------------------  Conclusions:  1. Calcified trileaflet aortic valve. Moderate-severe  aortic regurgitation. Vena contracta 0.5-0.6 cm  2. Upper limits of normal aortic root size for BSA.  (Ao:  3.8 cm at the sinuses of Valsalva). Mild atheroma noted in  aortic arch/descending aorta.  3. Normal left ventricular internal dimensions and wall  thicknesses.  4. Normal left ventricular systolic function. No segmental  wall motion abnormalities.  5. Mild diastolic dysfunction (Stage I).  *** Compared with transthoracic echocardiogram of  3/21/2017, no significant changes noted. Technically  difficult SAFIA. Surgeon: Zohaib Harris    Requesting Physician: ED attending    HISTORY OF PRESENT ILLNESS:  This is a 67 y/o female with PMHx of COPD/asthma, tracheobronchomalacia s/p tracheobronchoplasty on 10/25/16 via right VATS, Afib on Eliquis, adrenal Insufficieny on steroids, IDDM, HTN, anorectal SCC (last received chemo and radiation on July 2017, now in remission) who underwent a follow-up bronchoscopy by Dr. Herrera on 9/19/17 for on-going secretions and to obtain a BAL for analysis.  She stated that she felt feverish yesterday and took her temperature which was 100, and stated "that is really high for me".  She called Dr. Zapien who told her to go to the ED.  She tells me that she has been having some upper/mid abdomen vs. lower chest "discomfort" as of lately, along with some KRAMER after exerting herself and now 2-3 pillow orthopnea.  She has known "leaky aortic valve" and is supposed to get a repeat echo in about 6 months with a stress test.  She had a SAFIA here on 9/8/17, results listed below.  Also states she is currently nauseated, and has 'on-going nausea that's been going on forever'.  Currently she denies any CP, SOB, palpitations, fever or chills.  Denies any productive cough at this time.        In ED, 98.1F, HR 72-80, -135/60-80, RR 18, 93% on RA --> 98% on 2L NC. Given ceftriaxone 1g x1, tylenol 650mg, prednisone 40mg x1. CXR showing increasing density in RLL. CT surgery and Pulm were consulted in the ED.     PAST MEDICAL & SURGICAL HISTORY:  Aortic disease: leaky valve  Colorectal cancer: 7/2017- last treatment , chemo and radiation  Tracheobronchomalacia  Asthma  Pelvic fracture  Aortic insufficiency: mod/severe AI on echo 9/25/16  Adrenal insufficiency  COPD (chronic obstructive pulmonary disease)  Diabetes: type two. insulin dependent  Atrial fibrillation  H/O pelvic surgery  Exostosis of orbit, left: left eye prosthetic  History of sinus surgery  H/O total knee replacement, bilateral  History of partial hysterectomy      MEDICATIONS  (STANDING):  cefTRIAXone   IVPB 1 Gram(s) IV Intermittent every 24 hours  digoxin     Tablet 0.25 milliGRAM(s) Oral daily  apixaban 5 milliGRAM(s) Oral every 12 hours  pregabalin 150 milliGRAM(s) Oral two times a day  montelukast 10 milliGRAM(s) Oral daily  pantoprazole    Tablet 40 milliGRAM(s) Oral before breakfast  insulin lispro (HumaLOG) corrective regimen sliding scale   SubCutaneous Before meals and at bedtime  dextrose 5%. 1000 milliLiter(s) (50 mL/Hr) IV Continuous <Continuous>  dextrose 50% Injectable 12.5 Gram(s) IV Push once  dextrose 50% Injectable 25 Gram(s) IV Push once  dextrose 50% Injectable 25 Gram(s) IV Push once  predniSONE   Tablet 40 milliGRAM(s) Oral daily  loratadine 10 milliGRAM(s) Oral daily  ALBUTerol    90 MICROgram(s) HFA Inhaler 2 Puff(s) Inhalation every 4 hours  insulin glargine Injectable (LANTUS) 12 Unit(s) SubCutaneous at bedtime  influenza  Vaccine (HIGH DOSE) 0.5 milliLiter(s) IntraMuscular once  ALBUTerol/ipratropium for Nebulization 3 milliLiter(s) Nebulizer every 4 hours    MEDICATIONS  (PRN):  acetaminophen   Tablet 650 milliGRAM(s) Oral every 6 hours PRN For Temp greater than 38 C (100.4 F)  dextrose Gel 1 Dose(s) Oral once PRN Blood Glucose LESS THAN 70 milliGRAM(s)/deciliter  glucagon  Injectable 1 milliGRAM(s) IntraMuscular once PRN Glucose LESS THAN 70 milligrams/deciliter      Allergies    ampicillin (Short breath)  aspirin (Short breath)  Avelox (Short breath)  codeine (Short breath)  Dilaudid (Short breath)  iodine (Short breath)  penicillin (Short breath)  shellfish (Anaphylaxis)  tetanus toxoid (Short breath)  Valium (Short breath)    Intolerances        SOCIAL HISTORY:  Denies smoking, ETOH or drug abuse history.     FAMILY HISTORY:  Family history of breast cancer (Sibling)  Family history of asthma      Review of Systems  CONSTITUTIONAL:  Fevers / chills, sweats, fatigue, weight loss, weight gain                                    NEGATIVE  NEURO:  parathesias, seizures, syncope, confusion                                                                               NEGATIVE  EYES:  Blurry vision, discharge, pain, loss of vision                                                                                  NEGATIVE  ENMT:  Difficulty hearing, vertigo, dysphagia, epistaxis, recent dental work                                     NEGATIVE  CV:  +KRAMER.  +Chest pain, -palpitations, +orthopnea                                                                                        POSITIVE  RESPIRATORY:  Wheezing, SOB, cough / sputum, hemoptysis                                                              NEGATIVE  GI:  +Nausea, vommiting, diarrhea, constipation, melena                                                                       POSITIVE  : Hematuria, dysuria, urgency, incontinence                                                                                       NEGATIVE  MUSKULOSKELETAL:  arthritis, joint swelling, muscle weakness                                                           NEGATIVE  SKIN/BREAST:  rash, itching, paloma loss, masses                                                                                            NEGATIVE  PSYCH:  depresion, anxiety, suicidal ideation                                                                                             NEGATIVE  HEME/LYMPH:  bruises easily, enlarged lymph nodes, tender lymph nodes                                        NEGATIVE  ENDOCRINE:  cold intolerance, heat intolerance, polydipsia                                                                   NEGATIVE    PHYSICAL EXAM  Vital Signs Last 24 Hrs  T(C): 36.2 (21 Sep 2017 09:20), Max: 37 (20 Sep 2017 15:37)  T(F): 97.1 (21 Sep 2017 09:20), Max: 98.6 (20 Sep 2017 15:37)  HR: 67 (21 Sep 2017 09:20) (59 - 80)  BP: 133/77 (21 Sep 2017 09:20) (106/63 - 135/83)  BP(mean): --  RR: 18 (21 Sep 2017 09:20) (16 - 20)  SpO2: 97% (21 Sep 2017 09:20) (93% - 99%)    CONSTITUTIONAL:                                                                          NAD, lying in bed.    NEURO:                                                                                             No focal deficits                  EYES:                                                                                                  WNL  ENMT:                                                                                                WNL  CV:                                                                                                  S1S2 +diastolic murmur heard at LSB and apex  RESPIRATORY:                                                                              No wheezing or rales  GI:                                                                                                       Soft, non tender, non distended  : GERMAIN + / -                                                                           No  MUSKULOSKELETAL:                                                                     Moves all extremities grossly  SKIN / BREAST:                                                                                 WNL  PV: PT/DP 2+ palpable bilaterally                                                          LABS:                        12.6   4.5   )-----------( 228      ( 21 Sep 2017 06:10 )             40.5     09-21    139  |  101  |  13  ----------------------------<  241<H>  4.5   |  25  |  0.71    Ca    9.0      21 Sep 2017 06:07  Mg     2.1     09-21      PT/INR - ( 21 Sep 2017 06:11 )   PT: 11.9 sec;   INR: 1.07            RAdiologist & ADDITIONAL STUDIES:      CXR:< from: Xray Chest 1 View AP- PORTABLE-Urgent (09.20.17 @ 17:28) >  FINDINGS: There is an injection port catheter the tip located the   superior cavoatrial junction. Heart size, mediastinal and hilar contours   are unchanged. Again noted is cardiomegaly. No consolidation or pleural   collections. No pneumothorax.    CT Scan:< from: CT Chest No Cont (08.16.17 @ 16:06) >  IMPRESSION: No evidence of tracheomalacia. Flattening of the mainstem   bronchi by up to 70%.    3 mm right upper lobe pulmonary nodule new since April 4, 2017. A   three-month follow-up chest CT can be performed for further evaluation   given the history of cancer.    Emphysema    EKG:<NSR at 62 BPM    SAFIA< from: Transesophageal Echocardiogram (09.08.17 @ 11:36) >  ------------------------------------------------------------------------  Conclusions:  1. Calcified trileaflet aortic valve. Moderate-severe  aortic regurgitation. Vena contracta 0.5-0.6 cm  2. Upper limits of normal aortic root size for BSA.  (Ao:  3.8 cm at the sinuses of Valsalva). Mild atheroma noted in  aortic arch/descending aorta.  3. Normal left ventricular internal dimensions and wall  thicknesses.  4. Normal left ventricular systolic function. No segmental  wall motion abnormalities.  5. Mild diastolic dysfunction (Stage I).  *** Compared with transthoracic echocardiogram of  3/21/2017, no significant changes noted. Technically  difficult SAFIA. Surgeon: Zohaib Harris    Requesting Physician: ED attending    HISTORY OF PRESENT ILLNESS:  This is a 67 y/o female with PMHx of COPD/asthma, tracheobronchomalacia s/p tracheobronchoplasty on 10/25/16 via right VATS, Afib on Eliquis, adrenal Insufficieny on steroids, IDDM, HTN, anorectal SCC (last received chemo and radiation on July 2017, now in remission) who underwent a follow-up bronchoscopy by Dr. Herrera on 9/19/17 for on-going secretions and chronic cough (both have been present for "years"), and to obtain a BAL for analysis.  She stated that she felt feverish yesterday and took her temperature which was 100, and stated "that is high for me".  She called Dr. Zapien who told her to go to the ED.  She tells me that she has been having some upper/mid abdomen vs. lower chest "discomfort" as of lately, along with some KRAMER after exerting herself and now 2-3 pillow orthopnea.  She has known "leaky aortic valve" and is supposed to get a repeat echo in about 6 months with a stress test.  She had a SAFIA here on 9/8/17, results listed below.  Also states she is currently nauseated, and has 'on-going nausea that's been going on forever'.  Currently she denies any CP, SOB, palpitations, fever or chills.  Denies any productive cough at this time.        In ED, 98.1F, HR 72-80, -135/60-80, RR 18, 93% on RA --> 98% on 2L NC. Given ceftriaxone 1g x1, tylenol 650mg, prednisone 40mg x1. CXR showing increasing density in RLL. CT surgery and Pulm were consulted in the ED.     PAST MEDICAL & SURGICAL HISTORY:  Aortic disease: leaky valve  Colorectal cancer: 7/2017- last treatment , chemo and radiation  Tracheobronchomalacia  Asthma  Pelvic fracture  Aortic insufficiency: mod/severe AI on echo 9/25/16  Adrenal insufficiency  COPD (chronic obstructive pulmonary disease)  Diabetes: type two. insulin dependent  Atrial fibrillation  H/O pelvic surgery  Exostosis of orbit, left: left eye prosthetic  History of sinus surgery  H/O total knee replacement, bilateral  History of partial hysterectomy      MEDICATIONS  (STANDING):  cefTRIAXone   IVPB 1 Gram(s) IV Intermittent every 24 hours  digoxin     Tablet 0.25 milliGRAM(s) Oral daily  apixaban 5 milliGRAM(s) Oral every 12 hours  pregabalin 150 milliGRAM(s) Oral two times a day  montelukast 10 milliGRAM(s) Oral daily  pantoprazole    Tablet 40 milliGRAM(s) Oral before breakfast  insulin lispro (HumaLOG) corrective regimen sliding scale   SubCutaneous Before meals and at bedtime  dextrose 5%. 1000 milliLiter(s) (50 mL/Hr) IV Continuous <Continuous>  dextrose 50% Injectable 12.5 Gram(s) IV Push once  dextrose 50% Injectable 25 Gram(s) IV Push once  dextrose 50% Injectable 25 Gram(s) IV Push once  predniSONE   Tablet 40 milliGRAM(s) Oral daily  loratadine 10 milliGRAM(s) Oral daily  ALBUTerol    90 MICROgram(s) HFA Inhaler 2 Puff(s) Inhalation every 4 hours  insulin glargine Injectable (LANTUS) 12 Unit(s) SubCutaneous at bedtime  influenza  Vaccine (HIGH DOSE) 0.5 milliLiter(s) IntraMuscular once  ALBUTerol/ipratropium for Nebulization 3 milliLiter(s) Nebulizer every 4 hours    MEDICATIONS  (PRN):  acetaminophen   Tablet 650 milliGRAM(s) Oral every 6 hours PRN For Temp greater than 38 C (100.4 F)  dextrose Gel 1 Dose(s) Oral once PRN Blood Glucose LESS THAN 70 milliGRAM(s)/deciliter  glucagon  Injectable 1 milliGRAM(s) IntraMuscular once PRN Glucose LESS THAN 70 milligrams/deciliter      Allergies    ampicillin (Short breath)  aspirin (Short breath)  Avelox (Short breath)  codeine (Short breath)  Dilaudid (Short breath)  iodine (Short breath)  penicillin (Short breath)  shellfish (Anaphylaxis)  tetanus toxoid (Short breath)  Valium (Short breath)    Intolerances        SOCIAL HISTORY:  Denies smoking, ETOH or drug abuse history.     FAMILY HISTORY:  Family history of breast cancer (Sibling)  Family history of asthma      Review of Systems  CONSTITUTIONAL:  Fevers / chills, sweats, fatigue, weight loss, weight gain                                    NEGATIVE  NEURO:  parathesias, seizures, syncope, confusion                                                                               NEGATIVE  EYES:  Blurry vision, discharge, pain, loss of vision                                                                                  NEGATIVE  ENMT:  Difficulty hearing, vertigo, dysphagia, epistaxis, recent dental work                                     NEGATIVE  CV:  +KRAMER.  +Chest pain, -palpitations, +orthopnea                                                                                        POSITIVE  RESPIRATORY:  Wheezing, SOB, cough / sputum, hemoptysis                                                              NEGATIVE  GI:  +Nausea, vommiting, diarrhea, constipation, melena                                                                       POSITIVE  : Hematuria, dysuria, urgency, incontinence                                                                                       NEGATIVE  MUSKULOSKELETAL:  arthritis, joint swelling, muscle weakness                                                           NEGATIVE  SKIN/BREAST:  rash, itching, paloma loss, masses                                                                                            NEGATIVE  PSYCH:  depresion, anxiety, suicidal ideation                                                                                             NEGATIVE  HEME/LYMPH:  bruises easily, enlarged lymph nodes, tender lymph nodes                                        NEGATIVE  ENDOCRINE:  cold intolerance, heat intolerance, polydipsia                                                                   NEGATIVE    PHYSICAL EXAM  Vital Signs Last 24 Hrs  T(C): 36.2 (21 Sep 2017 09:20), Max: 37 (20 Sep 2017 15:37)  T(F): 97.1 (21 Sep 2017 09:20), Max: 98.6 (20 Sep 2017 15:37)  HR: 67 (21 Sep 2017 09:20) (59 - 80)  BP: 133/77 (21 Sep 2017 09:20) (106/63 - 135/83)  BP(mean): --  RR: 18 (21 Sep 2017 09:20) (16 - 20)  SpO2: 97% (21 Sep 2017 09:20) (93% - 99%)    CONSTITUTIONAL:                                                                          NAD, lying in bed.    NEURO:                                                                                             No focal deficits                  EYES:                                                                                                  WNL  ENMT:                                                                                                WNL  CV:                                                                                                  S1S2 +diastolic murmur heard at LSB and apex  RESPIRATORY:                                                                              Scattered wheezing  GI:                                                                                                       Soft, non tender, non distended  : GERMAIN + / -                                                                           No  MUSKULOSKELETAL:                                                                     Moves all extremities grossly  SKIN / BREAST:                                                                                 WNL  PV: PT/DP 2+ palpable bilaterally                                                          LABS:                        12.6   4.5   )-----------( 228      ( 21 Sep 2017 06:10 )             40.5     09-21    139  |  101  |  13  ----------------------------<  241<H>  4.5   |  25  |  0.71    Ca    9.0      21 Sep 2017 06:07  Mg     2.1     09-21      PT/INR - ( 21 Sep 2017 06:11 )   PT: 11.9 sec;   INR: 1.07            RAdiologist & ADDITIONAL STUDIES:      CXR:< from: Xray Chest 1 View AP- PORTABLE-Urgent (09.20.17 @ 17:28) >  FINDINGS: There is an injection port catheter the tip located the   superior cavoatrial junction. Heart size, mediastinal and hilar contours   are unchanged. Again noted is cardiomegaly. No consolidation or pleural   collections. No pneumothorax.    CT Scan:< from: CT Chest No Cont (08.16.17 @ 16:06) >  IMPRESSION: No evidence of tracheomalacia. Flattening of the mainstem   bronchi by up to 70%.    3 mm right upper lobe pulmonary nodule new since April 4, 2017. A   three-month follow-up chest CT can be performed for further evaluation   given the history of cancer.    Emphysema    EKG:<NSR at 62 BPM    SAFIA< from: Transesophageal Echocardiogram (09.08.17 @ 11:36) >  ------------------------------------------------------------------------  Conclusions:  1. Calcified trileaflet aortic valve. Moderate-severe  aortic regurgitation. Vena contracta 0.5-0.6 cm  2. Upper limits of normal aortic root size for BSA.  (Ao:  3.8 cm at the sinuses of Valsalva). Mild atheroma noted in  aortic arch/descending aorta.  3. Normal left ventricular internal dimensions and wall  thicknesses.  4. Normal left ventricular systolic function. No segmental  wall motion abnormalities.  5. Mild diastolic dysfunction (Stage I).  *** Compared with transthoracic echocardiogram of  3/21/2017, no significant changes noted. Technically  difficult SAFIA. Surgeon: Zohaib Harris    Requesting Physician: ED attending    HISTORY OF PRESENT ILLNESS:  This is a 67 y/o female with PMHx of COPD/asthma, tracheobronchomalacia s/p tracheobronchoplasty on 10/25/16 via right VATS, Afib on Eliquis, adrenal Insufficieny on steroids, IDDM, HTN, anorectal SCC (last received chemo and radiation on July 2017, now in remission) who underwent a follow-up bronchoscopy by Dr. Herrera on 9/19/17 for on-going secretions and chronic cough (both have been present for "years"), and to obtain a BAL for analysis.  She stated that she felt feverish yesterday and took her temperature which was 100, and stated "that is high for me".  She called Dr. Zapien who told her to go to the ED.  She tells me that she has been having some upper/mid abdomen vs. lower chest "discomfort" as of lately, along with some KRAMER after exerting herself and now 2-3 pillow orthopnea.  She has known "leaky aortic valve" and is supposed to get a repeat echo in about 6 months with a stress test.  She had a SAFIA here on 9/8/17, results listed below.  Also states she is currently nauseated, and has 'on-going nausea that's been going on forever'.  Currently she denies any CP, SOB, palpitations, fever or chills.  Denies any productive cough at this time.        In ED, 98.1F, HR 72-80, -135/60-80, RR 18, 93% on RA --> 98% on 2L NC. Given ceftriaxone 1g x1, tylenol 650mg, prednisone 40mg x1. CXR showing increasing density in RLL. CT surgery and Pulm were consulted in the ED.     PAST MEDICAL & SURGICAL HISTORY:  Aortic disease: leaky valve  Colorectal cancer: 7/2017- last treatment , chemo and radiation  Tracheobronchomalacia  Asthma  Pelvic fracture  Aortic insufficiency: mod/severe AI on echo 9/25/16  Adrenal insufficiency  COPD (chronic obstructive pulmonary disease)  Diabetes: type two. insulin dependent  Atrial fibrillation  H/O pelvic surgery  Exostosis of orbit, left: left eye prosthetic  History of sinus surgery  H/O total knee replacement, bilateral  History of partial hysterectomy      MEDICATIONS  (STANDING):  cefTRIAXone   IVPB 1 Gram(s) IV Intermittent every 24 hours  digoxin     Tablet 0.25 milliGRAM(s) Oral daily  apixaban 5 milliGRAM(s) Oral every 12 hours  pregabalin 150 milliGRAM(s) Oral two times a day  montelukast 10 milliGRAM(s) Oral daily  pantoprazole    Tablet 40 milliGRAM(s) Oral before breakfast  insulin lispro (HumaLOG) corrective regimen sliding scale   SubCutaneous Before meals and at bedtime  dextrose 5%. 1000 milliLiter(s) (50 mL/Hr) IV Continuous <Continuous>  dextrose 50% Injectable 12.5 Gram(s) IV Push once  dextrose 50% Injectable 25 Gram(s) IV Push once  dextrose 50% Injectable 25 Gram(s) IV Push once  predniSONE   Tablet 40 milliGRAM(s) Oral daily  loratadine 10 milliGRAM(s) Oral daily  ALBUTerol    90 MICROgram(s) HFA Inhaler 2 Puff(s) Inhalation every 4 hours  insulin glargine Injectable (LANTUS) 12 Unit(s) SubCutaneous at bedtime  influenza  Vaccine (HIGH DOSE) 0.5 milliLiter(s) IntraMuscular once  ALBUTerol/ipratropium for Nebulization 3 milliLiter(s) Nebulizer every 4 hours    MEDICATIONS  (PRN):  acetaminophen   Tablet 650 milliGRAM(s) Oral every 6 hours PRN For Temp greater than 38 C (100.4 F)  dextrose Gel 1 Dose(s) Oral once PRN Blood Glucose LESS THAN 70 milliGRAM(s)/deciliter  glucagon  Injectable 1 milliGRAM(s) IntraMuscular once PRN Glucose LESS THAN 70 milligrams/deciliter      Allergies    ampicillin (Short breath)  aspirin (Short breath)  Avelox (Short breath)  codeine (Short breath)  Dilaudid (Short breath)  iodine (Short breath)  penicillin (Short breath)  shellfish (Anaphylaxis)  tetanus toxoid (Short breath)  Valium (Short breath)    Intolerances        SOCIAL HISTORY:  Denies smoking, ETOH or drug abuse history.     FAMILY HISTORY:  Family history of breast cancer (Sibling)  Family history of asthma      Review of Systems  CONSTITUTIONAL:  Fevers / chills, sweats, fatigue, weight loss, weight gain                                    NEGATIVE  NEURO:  parathesias, seizures, syncope, confusion                                                                               NEGATIVE  EYES:  Blurry vision, discharge, pain, loss of vision                                                                                  NEGATIVE  ENMT:  Difficulty hearing, vertigo, dysphagia, epistaxis, recent dental work                                     NEGATIVE  CV:  +KRAMER.  +Chest pain, -palpitations, +orthopnea                                                                                        POSITIVE  RESPIRATORY:  Wheezing, SOB, cough / sputum, hemoptysis                                                              NEGATIVE  GI:  +Nausea, vommiting, diarrhea, constipation, melena                                                                       POSITIVE  : Hematuria, dysuria, urgency, incontinence                                                                                       NEGATIVE  MUSKULOSKELETAL:  arthritis, joint swelling, muscle weakness                                                           NEGATIVE  SKIN/BREAST:  rash, itching, paloma loss, masses                                                                                            NEGATIVE  PSYCH:  depresion, anxiety, suicidal ideation                                                                                             NEGATIVE  HEME/LYMPH:  bruises easily, enlarged lymph nodes, tender lymph nodes                                        NEGATIVE  ENDOCRINE:  cold intolerance, heat intolerance, polydipsia                                                                   NEGATIVE    PHYSICAL EXAM  Vital Signs Last 24 Hrs  T(C): 36.2 (21 Sep 2017 09:20), Max: 37 (20 Sep 2017 15:37)  T(F): 97.1 (21 Sep 2017 09:20), Max: 98.6 (20 Sep 2017 15:37)  HR: 67 (21 Sep 2017 09:20) (59 - 80)  BP: 133/77 (21 Sep 2017 09:20) (106/63 - 135/83)  BP(mean): --  RR: 18 (21 Sep 2017 09:20) (16 - 20)  SpO2: 97% (21 Sep 2017 09:20) (93% - 99%)    CONSTITUTIONAL:                                                                          NAD, lying in bed.    NEURO:                                                                                             No focal deficits                  EYES:                                                                                                  WNL  ENMT:                                                                                                WNL  CV:                                                                                                  S1S2 +diastolic murmur heard at LSB and apex  RESPIRATORY:                                                                             +B/L rhonchi throughout lung fields  GI:                                                                                                       Soft, non tender, non distended  : GERMAIN + / -                                                                           No  MUSKULOSKELETAL:                                                                     Moves all extremities grossly  SKIN / BREAST:                                                                                 WNL  PV: PT/DP 2+ palpable bilaterally                                                          LABS:                        12.6   4.5   )-----------( 228      ( 21 Sep 2017 06:10 )             40.5     09-21    139  |  101  |  13  ----------------------------<  241<H>  4.5   |  25  |  0.71    Ca    9.0      21 Sep 2017 06:07  Mg     2.1     09-21      PT/INR - ( 21 Sep 2017 06:11 )   PT: 11.9 sec;   INR: 1.07            RAdiologist & ADDITIONAL STUDIES:      CXR:< from: Xray Chest 1 View AP- PORTABLE-Urgent (09.20.17 @ 17:28) >  FINDINGS: There is an injection port catheter the tip located the   superior cavoatrial junction. Heart size, mediastinal and hilar contours   are unchanged. Again noted is cardiomegaly. No consolidation or pleural   collections. No pneumothorax.    CT Scan:< from: CT Chest No Cont (08.16.17 @ 16:06) >  IMPRESSION: No evidence of tracheomalacia. Flattening of the mainstem   bronchi by up to 70%.    3 mm right upper lobe pulmonary nodule new since April 4, 2017. A   three-month follow-up chest CT can be performed for further evaluation   given the history of cancer.    Emphysema    EKG:<NSR at 62 BPM    SAFIA< from: Transesophageal Echocardiogram (09.08.17 @ 11:36) >  ------------------------------------------------------------------------  Conclusions:  1. Calcified trileaflet aortic valve. Moderate-severe  aortic regurgitation. Vena contracta 0.5-0.6 cm  2. Upper limits of normal aortic root size for BSA.  (Ao:  3.8 cm at the sinuses of Valsalva). Mild atheroma noted in  aortic arch/descending aorta.  3. Normal left ventricular internal dimensions and wall  thicknesses.  4. Normal left ventricular systolic function. No segmental  wall motion abnormalities.  5. Mild diastolic dysfunction (Stage I).  *** Compared with transthoracic echocardiogram of  3/21/2017, no significant changes noted. Technically  difficult SAFIA.

## 2017-09-21 NOTE — PROGRESS NOTE ADULT - PROBLEM SELECTOR PLAN 1
CXR showing increased density in RLL suggestive of possible aspiration PNA in setting of recent bronchoscopy on 9/19.  Repeat CXR in AM revealing no acute changes in comparison to 9/20 CXR, possible L pleural effusion.   Recent bronch growing > 100,000 cfu gm- rods, awaiting speciation  - pulm consulted: recommended ceftriaxone based on previous sensitivities   - CT surgery consulted; appreciate recs. Per CT surgery, low likelihood that   - c/w ceftriaxone 1g q24h   - f/u blood cx CXR showing increased density in RLL suggestive of possible aspiration PNA in setting of recent bronchoscopy on 9/19.  Repeat CXR in AM revealing no acute changes in comparison to 9/20 CXR, possible L pleural effusion.   Recent bronch growing > 100,000 cfu gm- rods, awaiting speciation  - pulm consulted: recommended ceftriaxone based on previous sensitivities   -Blood cx- NGTD, AFB negative  - CT surgery consulted; appreciate recs. Per CT surgery, low likelihood that PNA 2/2 to aspiration from bronchoscopy.   - c/w ceftriaxone 1g q24h

## 2017-09-21 NOTE — PROGRESS NOTE ADULT - PROBLEM SELECTOR PLAN 10
F: No IVF indicated at this time  E: Replete electrolytes as needed, Mg>4, K>2  N: Diabetic, Dash/TLC diet    Code: FULL  Dispo: 7Uris  DVT ppx: Apixaban 5 mg BID

## 2017-09-21 NOTE — CONSULT NOTE ADULT - ASSESSMENT
This is a 65 y/o female with PMHx of COPD/asthma, tracheobronchomalacia s/p tracheobronchoplasty on 10/25/16 via right VATS, Afib on Eliquis, adrenal Insufficieny on steroids, IDDM, HTN, anorectal SCC (last received chemo and radiation on July 2017, now in remission) who underwent a follow-up bronchoscopy by Dr. Herrera on 9/19/17 for on-going secretions and to obtain a BAL for analysis.  Thus far there has been no radiographic evidence of aspiration PNA, before or after her bronchoscopy.  The patient had been complaining of coughing up sputum prior to the bronchoscopy, which is what prompted the bronch.  She had recent +sputum culture from 8/24/17 growing 3 different organisms: Pasturella Multocida, Serratia & Alcaligenes faecalis.  She is now here with 1 day of fevers.  Infectious work-up in progress, blood cultures were sent.  BAL culture so far from 9/19/17 growing >100,000 GNR, C&S pending. This is a 67 y/o female with PMHx of COPD/asthma, tracheobronchomalacia s/p tracheobronchoplasty on 10/25/16 via right VATS, Afib on Eliquis, adrenal Insufficieny on steroids, IDDM, HTN, anorectal SCC (last received chemo and radiation on July 2017, now in remission) who underwent a follow-up bronchoscopy by Dr. Herrera on 9/19/17 for chronic on-going secretions/cough and to obtain a BAL for analysis.  Thus far, there has been no radiographic evidence of aspiration PNA, before or after her bronchoscopy.  Her clinical picture also does not fit the diagnosis of aspiration PNA.  The patient had been complaining of coughing up sputum prior to the bronchoscopy, which is what prompted the bronch.  She had recent +sputum culture from 8/24/17 growing 3 different organisms: Pasturella Multocida, Serratia & Alcaligenes faecalis and she completed a course of PO antibiotics as an out-patient.  She is now here with 1 day of fevers.  Infectious work-up in progress, blood cultures were sent.  BAL culture so far from 9/19/17 growing >100,000 GNR, C&S pending.

## 2017-09-21 NOTE — PROGRESS NOTE ADULT - PROBLEM SELECTOR PLAN 5
Pt has h/o adrenal insufficiency, currently on methylprednisolone 4mg qday  - in ED, was given 40mg prednisone   - will c/w prednisone 40mg qday for asthma/COPD exacerbation   - if pt becomes septic or clinically worsens, will give stress-dose steroids Pt has h/o adrenal insufficiency, currently on methylprednisolone 4mg qday  - in ED, was given 40mg prednisone   - will c/w prednisone 40mg qday for asthma/COPD exacerbation   - if pt becomes septic or clinically worsens, will give stress-dose steroids.

## 2017-09-21 NOTE — CONSULT NOTE ADULT - PROBLEM SELECTOR RECOMMENDATION 9
Patient remains on IV ceftriaxone, cultures are still pending C&S.    Afebrile since she's been here.  WBC has been WNL (4.5 today).    Followed by Pulmonology, on bronchodilators and prednisone.  Discussed with primary team, recommend getting her an Acapella "flutter" valve and a vibrating vest to assist in breaking up lung secretions.   Encourage IS use and ambulation.   Dispo per primary team.  She can follow back up with Dr. Zapien upon discharge. Patient remains on IV ceftriaxone, cultures are still pending C&S.  Pending speciation, will tailor her antibiotics.    Afebrile since she's been here.  WBC has been WNL (4.5 today).    Followed by Pulmonology, on bronchodilators and prednisone.  Discussed with primary team, recommend getting her an Acapella "flutter" valve and a vibrating vest to assist in breaking up lung secretions.   Encourage IS use and ambulation.   Dispo per primary team.  She can follow back up with Dr. Zapien upon discharge.

## 2017-09-22 ENCOUNTER — TRANSCRIPTION ENCOUNTER (OUTPATIENT)
Age: 67
End: 2017-09-22

## 2017-09-22 LAB
-  AMIKACIN: SIGNIFICANT CHANGE UP
-  AMPICILLIN/SULBACTAM: SIGNIFICANT CHANGE UP
-  CEFEPIME: SIGNIFICANT CHANGE UP
-  CEFOTAXIME: SIGNIFICANT CHANGE UP
-  CEFTAZIDIME: SIGNIFICANT CHANGE UP
-  CEFTRIAXONE: SIGNIFICANT CHANGE UP
-  CIPROFLOXACIN: SIGNIFICANT CHANGE UP
-  GENTAMICIN: SIGNIFICANT CHANGE UP
-  LEVOFLOXACIN: SIGNIFICANT CHANGE UP
-  MEROPENEM: SIGNIFICANT CHANGE UP
-  TOBRAMYCIN: SIGNIFICANT CHANGE UP
-  TRIMETHOPRIM/SULFAMETHOXAZOLE: SIGNIFICANT CHANGE UP
ANION GAP SERPL CALC-SCNC: 12 MMOL/L — SIGNIFICANT CHANGE UP (ref 5–17)
BUN SERPL-MCNC: 14 MG/DL — SIGNIFICANT CHANGE UP (ref 7–23)
CALCIUM SERPL-MCNC: 8.8 MG/DL — SIGNIFICANT CHANGE UP (ref 8.4–10.5)
CHLORIDE SERPL-SCNC: 103 MMOL/L — SIGNIFICANT CHANGE UP (ref 96–108)
CO2 SERPL-SCNC: 25 MMOL/L — SIGNIFICANT CHANGE UP (ref 22–31)
CREAT SERPL-MCNC: 0.71 MG/DL — SIGNIFICANT CHANGE UP (ref 0.5–1.3)
CULTURE RESULTS: SIGNIFICANT CHANGE UP
GLUCOSE SERPL-MCNC: 312 MG/DL — HIGH (ref 70–99)
HCT VFR BLD CALC: 35.7 % — SIGNIFICANT CHANGE UP (ref 34.5–45)
HGB BLD-MCNC: 11 G/DL — LOW (ref 11.5–15.5)
MAGNESIUM SERPL-MCNC: 2.1 MG/DL — SIGNIFICANT CHANGE UP (ref 1.6–2.6)
MCHC RBC-ENTMCNC: 24.3 PG — LOW (ref 27–34)
MCHC RBC-ENTMCNC: 30.8 G/DL — LOW (ref 32–36)
MCV RBC AUTO: 79 FL — LOW (ref 80–100)
METHOD TYPE: SIGNIFICANT CHANGE UP
ORGANISM # SPEC MICROSCOPIC CNT: SIGNIFICANT CHANGE UP
ORGANISM # SPEC MICROSCOPIC CNT: SIGNIFICANT CHANGE UP
PLATELET # BLD AUTO: 215 K/UL — SIGNIFICANT CHANGE UP (ref 150–400)
POTASSIUM SERPL-MCNC: 4 MMOL/L — SIGNIFICANT CHANGE UP (ref 3.5–5.3)
POTASSIUM SERPL-SCNC: 4 MMOL/L — SIGNIFICANT CHANGE UP (ref 3.5–5.3)
RBC # BLD: 4.52 M/UL — SIGNIFICANT CHANGE UP (ref 3.8–5.2)
RBC # FLD: 14.8 % — SIGNIFICANT CHANGE UP (ref 10.3–16.9)
SODIUM SERPL-SCNC: 140 MMOL/L — SIGNIFICANT CHANGE UP (ref 135–145)
SPECIMEN SOURCE: SIGNIFICANT CHANGE UP
WBC # BLD: 7.8 K/UL — SIGNIFICANT CHANGE UP (ref 3.8–10.5)
WBC # FLD AUTO: 7.8 K/UL — SIGNIFICANT CHANGE UP (ref 3.8–10.5)

## 2017-09-22 PROCEDURE — 99239 HOSP IP/OBS DSCHRG MGMT >30: CPT

## 2017-09-22 RX ORDER — BUDESONIDE AND FORMOTEROL FUMARATE DIHYDRATE 160; 4.5 UG/1; UG/1
2 AEROSOL RESPIRATORY (INHALATION)
Qty: 0 | Refills: 0 | Status: DISCONTINUED | OUTPATIENT
Start: 2017-09-22 | End: 2017-09-23

## 2017-09-22 RX ORDER — INSULIN GLARGINE 100 [IU]/ML
16 INJECTION, SOLUTION SUBCUTANEOUS AT BEDTIME
Qty: 0 | Refills: 0 | Status: DISCONTINUED | OUTPATIENT
Start: 2017-09-22 | End: 2017-09-22

## 2017-09-22 RX ORDER — CEFTRIAXONE 500 MG/1
1 INJECTION, POWDER, FOR SOLUTION INTRAMUSCULAR; INTRAVENOUS EVERY 24 HOURS
Qty: 0 | Refills: 0 | Status: DISCONTINUED | OUTPATIENT
Start: 2017-09-22 | End: 2017-09-22

## 2017-09-22 RX ORDER — ONDANSETRON 8 MG/1
4 TABLET, FILM COATED ORAL ONCE
Qty: 0 | Refills: 0 | Status: COMPLETED | OUTPATIENT
Start: 2017-09-22 | End: 2017-09-22

## 2017-09-22 RX ORDER — INSULIN GLARGINE 100 [IU]/ML
12 INJECTION, SOLUTION SUBCUTANEOUS AT BEDTIME
Qty: 0 | Refills: 0 | Status: DISCONTINUED | OUTPATIENT
Start: 2017-09-22 | End: 2017-09-23

## 2017-09-22 RX ORDER — ALBUTEROL 90 UG/1
2.5 AEROSOL, METERED ORAL EVERY 4 HOURS
Qty: 0 | Refills: 0 | Status: DISCONTINUED | OUTPATIENT
Start: 2017-09-22 | End: 2017-09-23

## 2017-09-22 RX ADMIN — LORATADINE 10 MILLIGRAM(S): 10 TABLET ORAL at 11:36

## 2017-09-22 RX ADMIN — INSULIN GLARGINE 12 UNIT(S): 100 INJECTION, SOLUTION SUBCUTANEOUS at 22:32

## 2017-09-22 RX ADMIN — ONDANSETRON 4 MILLIGRAM(S): 8 TABLET, FILM COATED ORAL at 21:58

## 2017-09-22 RX ADMIN — Medication 150 MILLIGRAM(S): at 18:21

## 2017-09-22 RX ADMIN — Medication 6: at 08:41

## 2017-09-22 RX ADMIN — ALBUTEROL 2.5 MILLIGRAM(S): 90 AEROSOL, METERED ORAL at 15:11

## 2017-09-22 RX ADMIN — Medication 6: at 18:13

## 2017-09-22 RX ADMIN — APIXABAN 5 MILLIGRAM(S): 2.5 TABLET, FILM COATED ORAL at 18:14

## 2017-09-22 RX ADMIN — ALBUTEROL 2.5 MILLIGRAM(S): 90 AEROSOL, METERED ORAL at 18:14

## 2017-09-22 RX ADMIN — ALBUTEROL 2 PUFF(S): 90 AEROSOL, METERED ORAL at 09:25

## 2017-09-22 RX ADMIN — APIXABAN 5 MILLIGRAM(S): 2.5 TABLET, FILM COATED ORAL at 05:59

## 2017-09-22 RX ADMIN — Medication 2: at 12:37

## 2017-09-22 RX ADMIN — PANTOPRAZOLE SODIUM 40 MILLIGRAM(S): 20 TABLET, DELAYED RELEASE ORAL at 06:00

## 2017-09-22 RX ADMIN — BUDESONIDE AND FORMOTEROL FUMARATE DIHYDRATE 2 PUFF(S): 160; 4.5 AEROSOL RESPIRATORY (INHALATION) at 21:59

## 2017-09-22 RX ADMIN — TIOTROPIUM BROMIDE 1 CAPSULE(S): 18 CAPSULE ORAL; RESPIRATORY (INHALATION) at 13:18

## 2017-09-22 RX ADMIN — Medication 40 MILLIGRAM(S): at 05:59

## 2017-09-22 RX ADMIN — ALBUTEROL 2.5 MILLIGRAM(S): 90 AEROSOL, METERED ORAL at 21:59

## 2017-09-22 RX ADMIN — Medication 0.25 MILLIGRAM(S): at 05:59

## 2017-09-22 RX ADMIN — Medication 150 MILLIGRAM(S): at 05:59

## 2017-09-22 RX ADMIN — MONTELUKAST 10 MILLIGRAM(S): 4 TABLET, CHEWABLE ORAL at 11:36

## 2017-09-22 RX ADMIN — Medication 4: at 21:59

## 2017-09-22 NOTE — DISCHARGE NOTE ADULT - HOSPITAL COURSE
66 yr F with PMH of COPD/asthma, tracheobronchomalacia s/p tracheo-bronchoplasty in 10/16, Afib on Eliquis, adrenal Insufficieny on steroids, DM2, HTN, anorectal SCC (last received chemo and radiation on July 2017, now in remission) presented to [facility] on 9/20 for low grade fever at home s/p bronchoscopy on 9/19 by Dr. Herrera. Pt states that yesterday she went for an elective bronchoscopy after a CT chest showed that was scheduled by Dr. Mcclellan, who she follows with regularly. After the procedure she felt a little nauseous and had chills with a low grade fever of 100.1F and increased brown sputum production, for which Dr Mcclellan's PA instructed the patient to come to the ED. Patient HD upon arrival to the ED, 98.1F, HR 72-80, -135/60-80, RR 18, 93% on RA with improvement in 02 to 98% after being placed on 2L NC. Pulmonology on board, started on 40 mg prednisone and resumed patient's home asthma/COPD meds. CXR revealed an increasing density in the RLL. Patient started on IV ig Ceftriaxone daily. Blood cx negative. BAL from bronchoscopy revealed gram negative rods (Acinetobacter lwoffi). Patient received 2 days worth of antibiotics while inpatient and prescribed 5 additional days of Levaquin upon discharge. Patient clinically improving, afebrile throughout admission. Patient HD stable and safe for discharge home today.    Patient to follow up with patient's cardiothoracic surgeon, oncologist and pulmonologist within 1-2 weeks upon leaving the hospital.

## 2017-09-22 NOTE — PROGRESS NOTE ADULT - PROBLEM SELECTOR PLAN 9
F: No IVF indicated at this time  E: Replete electrolytes as needed, Mg>4, K>2  N: Diabetic, Dash/TLC diet    Code: FULL

## 2017-09-22 NOTE — DISCHARGE NOTE ADULT - CARE PROVIDERS DIRECT ADDRESSES
[email or web address]

## 2017-09-22 NOTE — DISCHARGE NOTE ADULT - CARE PROVIDER_API CALL
Zach King), Hematology; Internal Medicine; Medical Oncology  [address] [place] [address]  Phone: [phone]  Fax: [phone]    Zohaib Zapien), Surgery; Thoracic Surgery  [address]  [address] [place] [address]  Phone: [phone]  Fax: [phone]    Eulalio Allison), Internal Medicine; Pulmonary Disease  [address] [address]  Phone: [phone]  Fax: [phone]

## 2017-09-22 NOTE — DISCHARGE NOTE ADULT - ADDITIONAL INSTRUCTIONS
Please follow up with your pulmonologist (Dr. Eulalio Allison), cardiothoracic surgeon (Dr Zapien) and medical oncologist (Dr. King) within 1-2 weeks upon leaving the hospital.

## 2017-09-22 NOTE — DISCHARGE NOTE ADULT - PLAN OF CARE
To resolve the infection in your lung You presented to the hospital with difficulty breathing, fevers, chills and a cough producing sputum after a recent bronchoscopy procedure. On imaging, you were found to have findings consistent with a pneumonia that is likely secondary to the recent bronchoscopy procedure you had. During this procedure, a sample was taken which revealed bacteria, called Acinetobacter lwoffi, for which we have been treating you with antibiotics during this hospitalization. You will need to continue to take antibiotics upon leaving the hospital. A prescription of Levaquin has been sent to your preferred pharmacy. You will need to take this medication once a day for the next five days starting tomorrow (9/23). Please take this medication as indicated. You have a known history of adrenal insufficiency that requires you to take steroids at home. Please do not resume your home dose of steroids (4 mg) for this condition until you have finished the course of steroids (40 mg daily) that is being prescribed to you. Resume your 4mg home dose on 9/28. You have a known history of asthma. Please resume your home medications and take them as indicated. No changes in dosing were made during this hospitalization. Please follow up with your pulmonologist within 1-2 weeks upon leaving the hospital for further management of this issue. You have a known history of atrial fibrillation, an abnormal rhythm of the heart, which requires that you take a medication at home known as Eliquis. Please continue to take this medication as indicated. No changes were made to the dose of this medication. You have a known history of diabetes. Please continue to take your home medications upon leaving the hospital. You have a known history of tracheobronchomalacia. Please follow up with your cardiothoracic surgeon upon leaving the hospital within 1-2 weeks upon leaving the hospital.

## 2017-09-22 NOTE — PROGRESS NOTE ADULT - PROBLEM SELECTOR PLAN 2
Seems resolving. Minimal/non wheezing.   Breathing comfortably on RA>   * c/w albuterol q4h per pulm recs   * c/w prednisone 40mg qday for total course of 7 days and then stop. Patient should be back to her home Medrol 4mg daily -for adrenal insufficiency.   * c/w home breo, montelukast  * Needs VÍCTOR/inhaled steroids/LABA/Prednisone.

## 2017-09-22 NOTE — DISCHARGE NOTE ADULT - MEDICATION SUMMARY - MEDICATIONS TO TAKE
I will START or STAY ON the medications listed below when I get home from the hospital:    predniSONE 20 mg oral tablet  -- 2 tab(s) by mouth once a day  Last day of dosin/27  -- Indication: For Asthma    methylPREDNISolone 4 mg oral tablet  -- orally once a day. Resume medication on  after prednisone course.  -- Indication: For Adrenal insufficiency    digoxin 250 mcg (0.25 mg) oral tablet  -- 1 tab(s) by mouth once a day  -- Indication: For Anti-arrythmic    Eliquis 5 mg oral tablet  -- 1 tab(s) by mouth 2 times a day  -- Indication: For Atrial fibrillation    pregabalin 150 mg oral capsule  -- 1 cap(s) by mouth 2 times a day  -- Indication: For Neuropathy    Lantus Solostar Pen 100 units/mL subcutaneous solution  -- 12 unit(s) subcutaneous once a day (at bedtime)  -- Do not drink alcoholic beverages when taking this medication.  It is very important that you take or use this exactly as directed.  Do not skip doses or discontinue unless directed by your doctor.  Keep in refrigerator.  Do not freeze.    -- Indication: For Diabetes mellitus    Xyzal 5 mg oral tablet  -- 1 tab(s) by mouth once a day (in the evening)  -- Indication: For Asthma    Spiriva 18 mcg inhalation capsule  -- 1 cap(s) inhaled once a day  -- Indication: For Asthma    albuterol CFC free 90 mcg/inh inhalation aerosol  -- 1 puff(s) inhaled every 4 hours, As Needed - for shortness of breath and/or wheezing  -- Indication: For Asthma    Breo Ellipta 200 mcg-25 mcg/inh inhalation powder  -- 1 puff(s) inhaled once a day  -- Indication: For Asthma    Xolair 150 mg subcutaneous injection  -- subcutaneous every 2 weeks  -- Indication: For Asthma    montelukast 10 mg oral tablet  -- 1 tab(s) by mouth once a day  -- Indication: For Asthma    pantoprazole 40 mg oral delayed release tablet  -- 1 tab(s) by mouth once a day (before a meal)  -- Indication: For GERD    levoFLOXacin 750 mg oral tablet  -- 1 tab(s) by mouth every 24 hours  -- Indication: For Pneumonia

## 2017-09-22 NOTE — PROGRESS NOTE ADULT - SUBJECTIVE AND OBJECTIVE BOX
CC: SOB improved. Cough and sputum also improved.   Denies other ROS. No overnight events.     Vital Signs Last 24 Hrs  T(C): 36.8 (22 Sep 2017 10:16), Max: 36.8 (22 Sep 2017 10:16)  T(F): 98.3 (22 Sep 2017 10:16), Max: 98.3 (22 Sep 2017 10:16)  HR: 69 (22 Sep 2017 10:16) (63 - 92)  BP: 119/70 (22 Sep 2017 10:16) (108/65 - 131/60)  BP(mean): --  RR: 20 (22 Sep 2017 10:16) (17 - 20)  SpO2: 95% (22 Sep 2017 10:16) (95% - 96%)    PHYSICAL EXAMINATION  * General: Not in acute distress. Awake and alert. Lying comfortably in bed.  * Head: Normocephalic, atraumatic.  * HEENT: ears no discharge, eyes PERRLA, nose no discharge, throat no exudates, normal tonsils.  * Neck: no JVD, supple.  * Lungs: Clear to auscultation, no rales, no wheezes.  * Cardio: Regular rate and rhythm, no murmurs, no rubs, no gallops. Good peripheral pulses.  * Abdomen: Soft, non-tender, non-distended, tympanic to percussion, no rebound, no guarding, no rigidity. Bowel sounds present. No suprapubic or CVA tenderness.  * : Deferred.  * Extremities: Acyanotic, no edema.  * Skin: Warm and dry.  * Neuro: Alert and oriented x 3. No focal deficits. Motor strength is 5/5 throughout. Sensation intact. Cranial nerves II-XII grossly intact.                           11.0   7.8   )-----------( 215      ( 22 Sep 2017 06:08 )             35.7     09-22    140  |  103  |  14  ----------------------------<  312<H>  4.0   |  25  |  0.71    Ca    8.8      22 Sep 2017 06:07  Mg     2.1     09-22    MEDICATIONS  (STANDING):  digoxin     Tablet 0.25 milliGRAM(s) Oral daily  apixaban 5 milliGRAM(s) Oral every 12 hours  pregabalin 150 milliGRAM(s) Oral two times a day  montelukast 10 milliGRAM(s) Oral daily  pantoprazole    Tablet 40 milliGRAM(s) Oral before breakfast  insulin lispro (HumaLOG) corrective regimen sliding scale   SubCutaneous Before meals and at bedtime  dextrose 5%. 1000 milliLiter(s) (50 mL/Hr) IV Continuous <Continuous>  dextrose 50% Injectable 12.5 Gram(s) IV Push once  dextrose 50% Injectable 25 Gram(s) IV Push once  dextrose 50% Injectable 25 Gram(s) IV Push once  predniSONE   Tablet 40 milliGRAM(s) Oral daily  loratadine 10 milliGRAM(s) Oral daily  influenza  Vaccine (HIGH DOSE) 0.5 milliLiter(s) IntraMuscular once  tiotropium 18 MICROgram(s) Capsule 1 Capsule(s) Inhalation daily  levoFLOXacin  Tablet 750 milliGRAM(s) Oral every 24 hours  ALBUTerol    0.083% 2.5 milliGRAM(s) Nebulizer every 4 hours  insulin glargine Injectable (LANTUS) 12 Unit(s) SubCutaneous at bedtime  buDESOnide 160 MICROgram(s)/formoterol 4.5 MICROgram(s) Inhaler 2 Puff(s) Inhalation two times a day    MEDICATIONS  (PRN):  acetaminophen   Tablet 650 milliGRAM(s) Oral every 6 hours PRN For Temp greater than 38 C (100.4 F)  dextrose Gel 1 Dose(s) Oral once PRN Blood Glucose LESS THAN 70 milliGRAM(s)/deciliter  glucagon  Injectable 1 milliGRAM(s) IntraMuscular once PRN Glucose LESS THAN 70 milligrams/deciliter
Interval Events:  Patient seen and examined at bedside. No acute events overnight. Patient reports that her breathing is much better.    MEDICATIONS:  Pulmonary:  montelukast 10 milliGRAM(s) Oral daily  loratadine 10 milliGRAM(s) Oral daily  ALBUTerol    90 MICROgram(s) HFA Inhaler 2 Puff(s) Inhalation every 4 hours  ALBUTerol/ipratropium for Nebulization 3 milliLiter(s) Nebulizer every 4 hours    Antimicrobials:  cefTRIAXone   IVPB 1 Gram(s) IV Intermittent every 24 hours    Anticoagulants:  apixaban 5 milliGRAM(s) Oral every 12 hours    Cardiac:  digoxin     Tablet 0.25 milliGRAM(s) Oral daily    Endocrine:  insulin lispro (HumaLOG) corrective regimen sliding scale   SubCutaneous Before meals and at bedtime  dextrose Gel 1 Dose(s) Oral once PRN  dextrose 50% Injectable 12.5 Gram(s) IV Push once  dextrose 50% Injectable 25 Gram(s) IV Push once  dextrose 50% Injectable 25 Gram(s) IV Push once  glucagon  Injectable 1 milliGRAM(s) IntraMuscular once PRN  predniSONE   Tablet 40 milliGRAM(s) Oral daily  insulin glargine Injectable (LANTUS) 12 Unit(s) SubCutaneous at bedtime    Allergies    ampicillin (Short breath)  aspirin (Short breath)  Avelox (Short breath)  codeine (Short breath)  Dilaudid (Short breath)  iodine (Short breath)  penicillin (Short breath)  shellfish (Anaphylaxis)  tetanus toxoid (Short breath)  Valium (Short breath)    Intolerances        Vital Signs Last 24 Hrs  T(C): 36.6 (21 Sep 2017 15:01), Max: 36.7 (20 Sep 2017 20:14)  T(F): 97.9 (21 Sep 2017 15:01), Max: 98.1 (20 Sep 2017 20:14)  HR: 87 (21 Sep 2017 15:01) (59 - 87)  BP: 114/66 (21 Sep 2017 15:01) (106/63 - 133/77)  BP(mean): --  RR: 17 (21 Sep 2017 15:01) (17 - 20)  SpO2: 95% (21 Sep 2017 15:01) (93% - 98%)    PHYSICAL EXAM:    General: Well developed; well nourished; in no acute distress  Eyes: PERRL, EOM intact; conjunctiva and sclera clear  Head: Normocephalic; atraumatic  ENMT: No nasal discharge; airway clear  Neck: Supple; non tender; no masses  Respiratory: +wheezing and rhonchi bilaterally and diffusely  Cardiovascular: Regular rate and rhythm. S1 and S2 Normal; No murmurs, gallops or rubs  Gastrointestinal: Soft non-tender non-distended; Normal bowel sounds; No hepatosplenomegaly  Genitourinary: No costovertebral angle tenderness  Extremities: Normal range of motion, No clubbing, cyanosis or edema  Neurological: Alert and oriented x3  Skin: Warm and dry. No obvious rash  Psychiatric: Cooperative and appropriate mood    LABS:      CBC Full  -  ( 21 Sep 2017 06:10 )  WBC Count : 4.5 K/uL  Hemoglobin : 12.6 g/dL  Hematocrit : 40.5 %  Platelet Count - Automated : 228 K/uL  Mean Cell Volume : 79.7 fL  Mean Cell Hemoglobin : 24.8 pg  Mean Cell Hemoglobin Concentration : 31.1 g/dL  Auto Neutrophil # : x  Auto Lymphocyte # : x  Auto Monocyte # : x  Auto Eosinophil # : x  Auto Basophil # : x  Auto Neutrophil % : x  Auto Lymphocyte % : x  Auto Monocyte % : x  Auto Eosinophil % : x  Auto Basophil % : x    09-21    139  |  101  |  13  ----------------------------<  241<H>  4.5   |  25  |  0.71    Ca    9.0      21 Sep 2017 06:07  Mg     2.1     09-21      PT/INR - ( 21 Sep 2017 06:11 )   PT: 11.9 sec;   INR: 1.07                            RADIOLOGY & ADDITIONAL STUDIES (The following images were personally reviewed):
OVERNIGHT EVENTS:    SUBJECTIVE / INTERVAL HPI: Patient seen and examined at bedside.     VITAL SIGNS:  Vital Signs Last 24 Hrs  T(C): 36.2 (21 Sep 2017 09:20), Max: 37 (20 Sep 2017 15:37)  T(F): 97.1 (21 Sep 2017 09:20), Max: 98.6 (20 Sep 2017 15:37)  HR: 67 (21 Sep 2017 09:20) (59 - 80)  BP: 133/77 (21 Sep 2017 09:20) (106/63 - 135/83)  BP(mean): --  RR: 18 (21 Sep 2017 09:20) (16 - 20)  SpO2: 97% (21 Sep 2017 09:20) (93% - 99%)    PHYSICAL EXAM:    General: WDWN  HEENT: NC/AT; PERRL, anicteric sclera; MMM  Neck: supple  Cardiovascular: +S1/S2; RRR  Respiratory: CTA B/L; no W/R/R  Gastrointestinal: soft, NT/ND; +BSx4  Extremities: WWP; no edema, clubbing or cyanosis  Vascular: 2+ radial, DP/PT pulses B/L  Neurological: AAOx3; no focal deficits    MEDICATIONS:  MEDICATIONS  (STANDING):  cefTRIAXone   IVPB 1 Gram(s) IV Intermittent every 24 hours  digoxin     Tablet 0.25 milliGRAM(s) Oral daily  apixaban 5 milliGRAM(s) Oral every 12 hours  pregabalin 150 milliGRAM(s) Oral two times a day  montelukast 10 milliGRAM(s) Oral daily  pantoprazole    Tablet 40 milliGRAM(s) Oral before breakfast  insulin lispro (HumaLOG) corrective regimen sliding scale   SubCutaneous Before meals and at bedtime  dextrose 5%. 1000 milliLiter(s) (50 mL/Hr) IV Continuous <Continuous>  dextrose 50% Injectable 12.5 Gram(s) IV Push once  dextrose 50% Injectable 25 Gram(s) IV Push once  dextrose 50% Injectable 25 Gram(s) IV Push once  predniSONE   Tablet 40 milliGRAM(s) Oral daily  loratadine 10 milliGRAM(s) Oral daily  ALBUTerol    90 MICROgram(s) HFA Inhaler 2 Puff(s) Inhalation every 4 hours  insulin glargine Injectable (LANTUS) 12 Unit(s) SubCutaneous at bedtime  influenza  Vaccine (HIGH DOSE) 0.5 milliLiter(s) IntraMuscular once  ALBUTerol/ipratropium for Nebulization 3 milliLiter(s) Nebulizer every 4 hours    MEDICATIONS  (PRN):  acetaminophen   Tablet 650 milliGRAM(s) Oral every 6 hours PRN For Temp greater than 38 C (100.4 F)  dextrose Gel 1 Dose(s) Oral once PRN Blood Glucose LESS THAN 70 milliGRAM(s)/deciliter  glucagon  Injectable 1 milliGRAM(s) IntraMuscular once PRN Glucose LESS THAN 70 milligrams/deciliter      ALLERGIES:  Allergies    ampicillin (Short breath)  aspirin (Short breath)  Avelox (Short breath)  codeine (Short breath)  Dilaudid (Short breath)  iodine (Short breath)  penicillin (Short breath)  shellfish (Anaphylaxis)  tetanus toxoid (Short breath)  Valium (Short breath)    Intolerances        LABS:                        12.6   4.5   )-----------( 228      ( 21 Sep 2017 06:10 )             40.5     09-21    139  |  101  |  13  ----------------------------<  241<H>  4.5   |  25  |  0.71    Ca    9.0      21 Sep 2017 06:07  Mg     2.1     09-21      PT/INR - ( 21 Sep 2017 06:11 )   PT: 11.9 sec;   INR: 1.07              CAPILLARY BLOOD GLUCOSE  223 (21 Sep 2017 07:36)          RADIOLOGY & ADDITIONAL TESTS: Reviewed.    ASSESSMENT:    PLAN:
OVERNIGHT EVENTS: No acute overnight events.    SUBJECTIVE / INTERVAL HPI: Patient seen and examined at bedside. Patient continues to report a productive cough with brownish sputum (lighter in color than before). Patient still endorses some shortness of breath. Patient noting some nausea with no associated vomiting this AM. Denies fevers, chills, NS, dizziness, chest pain. Remainder of ROS negative.     VITAL SIGNS:  Vital Signs Last 24 Hrs  T(C): 36.6 (21 Sep 2017 15:01), Max: 36.7 (20 Sep 2017 20:14)  T(F): 97.9 (21 Sep 2017 15:01), Max: 98.1 (20 Sep 2017 20:14)  HR: 87 (21 Sep 2017 15:01) (59 - 87)  BP: 114/66 (21 Sep 2017 15:01) (106/63 - 133/77)  BP(mean): --  RR: 17 (21 Sep 2017 15:01) (17 - 20)  SpO2: 95% (21 Sep 2017 15:01) (93% - 98%)    PHYSICAL EXAM:    General: WDWN, lying comfortably in bed (with 2 pillows as this is more comfortable for her breathing), on NC 2L  HEENT: NC/AT; PERRL, anicteric sclera; arcus senilis, MMM, upper dentures, no oral sores/thrush  Neck: supple  Lymph: no cervical or supraclavicular LAD  Cardiovascular: +S1/S2; RRR, no m/r/g appreciated on auscultation  Respiratory: diffuse rhonchi and wheezing noted; speaking in full sentences, no intercostal retractions with no accessory muscle use  Gastrointestinal: soft, NT/ND; hypoactive BS  Extremities: WWP; no edema, clubbing or cyanosis  Vascular: 2+ radial, DP/PT pulses B/L  Neurological: AAOx3; no focal deficits    MEDICATIONS:  MEDICATIONS  (STANDING):  cefTRIAXone   IVPB 1 Gram(s) IV Intermittent every 24 hours  digoxin     Tablet 0.25 milliGRAM(s) Oral daily  apixaban 5 milliGRAM(s) Oral every 12 hours  pregabalin 150 milliGRAM(s) Oral two times a day  montelukast 10 milliGRAM(s) Oral daily  pantoprazole    Tablet 40 milliGRAM(s) Oral before breakfast  insulin lispro (HumaLOG) corrective regimen sliding scale   SubCutaneous Before meals and at bedtime  dextrose 5%. 1000 milliLiter(s) (50 mL/Hr) IV Continuous <Continuous>  dextrose 50% Injectable 12.5 Gram(s) IV Push once  dextrose 50% Injectable 25 Gram(s) IV Push once  dextrose 50% Injectable 25 Gram(s) IV Push once  predniSONE   Tablet 40 milliGRAM(s) Oral daily  loratadine 10 milliGRAM(s) Oral daily  ALBUTerol    90 MICROgram(s) HFA Inhaler 2 Puff(s) Inhalation every 4 hours  insulin glargine Injectable (LANTUS) 12 Unit(s) SubCutaneous at bedtime  influenza  Vaccine (HIGH DOSE) 0.5 milliLiter(s) IntraMuscular once  ALBUTerol/ipratropium for Nebulization 3 milliLiter(s) Nebulizer every 4 hours    MEDICATIONS  (PRN):  acetaminophen   Tablet 650 milliGRAM(s) Oral every 6 hours PRN For Temp greater than 38 C (100.4 F)  dextrose Gel 1 Dose(s) Oral once PRN Blood Glucose LESS THAN 70 milliGRAM(s)/deciliter  glucagon  Injectable 1 milliGRAM(s) IntraMuscular once PRN Glucose LESS THAN 70 milligrams/deciliter      ALLERGIES:  Allergies    ampicillin (Short breath)  aspirin (Short breath)  Avelox (Short breath)  codeine (Short breath)  Dilaudid (Short breath)  iodine (Short breath)  penicillin (Short breath)  shellfish (Anaphylaxis)  tetanus toxoid (Short breath)  Valium (Short breath)    Intolerances        LABS:                        12.6   4.5   )-----------( 228      ( 21 Sep 2017 06:10 )             40.5     09-21    139  |  101  |  13  ----------------------------<  241<H>  4.5   |  25  |  0.71    Ca    9.0      21 Sep 2017 06:07  Mg     2.1     09-21      PT/INR - ( 21 Sep 2017 06:11 )   PT: 11.9 sec;   INR: 1.07              CAPILLARY BLOOD GLUCOSE  220 (21 Sep 2017 11:50)          RADIOLOGY & ADDITIONAL TESTS: Reviewed.    ASSESSMENT:    PLAN:

## 2017-09-22 NOTE — DISCHARGE NOTE ADULT - CARE PLAN
Principal Discharge DX:	Aspiration pneumonia of right lower lobe due to anesthesia during labor and delivery  Goal:	To resolve the infection in your lung  Instructions for follow-up, activity and diet:	You presented to the hospital with difficulty breathing, fevers, chills and a cough producing sputum after a recent bronchoscopy procedure. On imaging, you were found to have findings consistent with a pneumonia that is likely secondary to the recent bronchoscopy procedure you had. During this procedure, a sample was taken which revealed bacteria, called Acinetobacter lwoffi, for which we have been treating you with antibiotics during this hospitalization. You will need to continue to take antibiotics upon leaving the hospital. A prescription of Levaquin has been sent to your preferred pharmacy. You will need to take this medication once a day for the next five days starting tomorrow (9/23). Please take this medication as indicated.  Secondary Diagnosis:	Adrenal insufficiency  Instructions for follow-up, activity and diet:	You have a known history of adrenal insufficiency that requires you to take steroids at home. Please do not resume your home dose of steroids (4 mg) for this condition until you have finished the course of steroids (40 mg daily) that is being prescribed to you. Resume your 4mg home dose on 9/28.  Secondary Diagnosis:	Asthma  Instructions for follow-up, activity and diet:	You have a known history of asthma. Please resume your home medications and take them as indicated. No changes in dosing were made during this hospitalization. Please follow up with your pulmonologist within 1-2 weeks upon leaving the hospital for further management of this issue.  Secondary Diagnosis:	Chronic atrial fibrillation  Instructions for follow-up, activity and diet:	You have a known history of atrial fibrillation, an abnormal rhythm of the heart, which requires that you take a medication at home known as Eliquis. Please continue to take this medication as indicated. No changes were made to the dose of this medication.  Secondary Diagnosis:	COPD (chronic obstructive pulmonary disease)  Instructions for follow-up, activity and diet:	You have a known history of asthma. Please resume your home medications and take them as indicated. No changes in dosing were made during this hospitalization. Please follow up with your pulmonologist within 1-2 weeks upon leaving the hospital for further management of this issue.  Secondary Diagnosis:	Diabetes  Instructions for follow-up, activity and diet:	You have a known history of diabetes. Please continue to take your home medications upon leaving the hospital.  Secondary Diagnosis:	Tracheobronchomalacia  Instructions for follow-up, activity and diet:	You have a known history of tracheobronchomalacia. Please follow up with your cardiothoracic surgeon upon leaving the hospital within 1-2 weeks upon leaving the hospital. Principal Discharge DX:	Aspiration into airway  Goal:	To resolve the infection in your lung  Instructions for follow-up, activity and diet:	You presented to the hospital with difficulty breathing, fevers, chills and a cough producing sputum after a recent bronchoscopy procedure. On imaging, you were found to have findings consistent with a pneumonia that is likely secondary to the recent bronchoscopy procedure you had. During this procedure, a sample was taken which revealed bacteria, called Acinetobacter lwoffi, for which we have been treating you with antibiotics during this hospitalization. You will need to continue to take antibiotics upon leaving the hospital. A prescription of Levaquin has been sent to your preferred pharmacy. You will need to take this medication once a day for the next five days starting tomorrow (9/23). Please take this medication as indicated.  Secondary Diagnosis:	Adrenal insufficiency  Instructions for follow-up, activity and diet:	You have a known history of adrenal insufficiency that requires you to take steroids at home. Please do not resume your home dose of steroids (4 mg) for this condition until you have finished the course of steroids (40 mg daily) that is being prescribed to you. Resume your 4mg home dose on 9/28.  Secondary Diagnosis:	Asthma  Instructions for follow-up, activity and diet:	You have a known history of asthma. Please resume your home medications and take them as indicated. No changes in dosing were made during this hospitalization. Please follow up with your pulmonologist within 1-2 weeks upon leaving the hospital for further management of this issue.  Secondary Diagnosis:	Chronic atrial fibrillation  Instructions for follow-up, activity and diet:	You have a known history of atrial fibrillation, an abnormal rhythm of the heart, which requires that you take a medication at home known as Eliquis. Please continue to take this medication as indicated. No changes were made to the dose of this medication.  Secondary Diagnosis:	COPD (chronic obstructive pulmonary disease)  Instructions for follow-up, activity and diet:	You have a known history of asthma. Please resume your home medications and take them as indicated. No changes in dosing were made during this hospitalization. Please follow up with your pulmonologist within 1-2 weeks upon leaving the hospital for further management of this issue.  Secondary Diagnosis:	Diabetes  Instructions for follow-up, activity and diet:	You have a known history of diabetes. Please continue to take your home medications upon leaving the hospital.  Secondary Diagnosis:	Tracheobronchomalacia  Instructions for follow-up, activity and diet:	You have a known history of tracheobronchomalacia. Please follow up with your cardiothoracic surgeon upon leaving the hospital within 1-2 weeks upon leaving the hospital.

## 2017-09-22 NOTE — PROGRESS NOTE ADULT - PROBLEM SELECTOR PLAN 5
Pt has h/o adrenal insufficiency, currently on methylprednisolone 4mg qday  * will c/w prednisone 40mg qday for asthma exacerbation for total course of 7 days and then back to her home dose of Medrol 4mg daily.

## 2017-09-22 NOTE — PROGRESS NOTE ADULT - PROBLEM SELECTOR PLAN 4
Pt w/ history of tracheobronchomalacia s/p tracheo-bronchoplasty in 10/16  * CT surgery recs appreciated.

## 2017-09-22 NOTE — DISCHARGE NOTE ADULT - PRINCIPAL DIAGNOSIS
Aspiration pneumonia of right lower lobe due to anesthesia during labor and delivery Aspiration into airway

## 2017-09-22 NOTE — PROGRESS NOTE ADULT - PROBLEM SELECTOR PROBLEM 1
Aspiration pneumonia of right lower lobe due to anesthesia during labor and delivery
Aspiration pneumonia of right lower lobe due to anesthesia during labor and delivery
Asthma with acute exacerbation, unspecified asthma severity

## 2017-09-22 NOTE — PROGRESS NOTE ADULT - PROBLEM SELECTOR PLAN 7
EKG showing NSR with HR 70s  - c/w home eliquis 5mg BID  - c/w home digoxin 0.25mg qday
* c/w home eliquis 5mg BID  * c/w home digoxin 0.25mg qday

## 2017-09-22 NOTE — DISCHARGE NOTE ADULT - PATIENT PORTAL LINK FT
“You can access the [facility] Patient Portal, offered by [facility], by registering with the following website: [email or web address]”

## 2017-09-22 NOTE — PROGRESS NOTE ADULT - PROBLEM SELECTOR PLAN 1
HAP vs aspiration PNA. No sepsis.   Growing Acinetobacter in sputum, pan sensitive.   * Will stop Ceftriaxone.   * Can Switch to Levaquin 750mg po daily for total course of 7 days.

## 2017-09-22 NOTE — DISCHARGE NOTE ADULT - SECONDARY DIAGNOSIS.
Adrenal insufficiency Asthma Chronic atrial fibrillation COPD (chronic obstructive pulmonary disease) Diabetes Tracheobronchomalacia

## 2017-09-22 NOTE — PROGRESS NOTE ADULT - ASSESSMENT
66 yr F with PMH of COPD/asthma, tracheobronchomalacia s/p tracheo-bronchoplasty in 10/16, Afib on Eliquis, adrenal Insufficieny on steroids, DM2, HTN, Squamous cell CA of the anus on chemo/XRT presents for low grade fever at home s/p bronchoscopy on 9/19 by Dr. Herrera, currently being treated for possible aspiration PNA.
66 year old woman with asthma/copd/tracheobronchomalacia presenting with asthma/copd exacerbation s/p bronchoscopy.
67yo F, PMH of asthma and COPD, tracheobronchomalacia s/p tracheo-bronchoplasty in 10/16, Afib on Eliquis, adrenal Insufficieny on steroids,IDDM2, HTN, Squamous cell CA of the anus on chemo/XRT. Admitted for HAP vs aspiration PNA

## 2017-09-22 NOTE — CHART NOTE - NSCHARTNOTEFT_GEN_A_CORE
Patient refused to be discharged this evening and is willing to leave in the AM. May require 24 hour notice should patient continue to refuse.

## 2017-09-23 VITALS
OXYGEN SATURATION: 96 % | HEART RATE: 58 BPM | RESPIRATION RATE: 18 BRPM | SYSTOLIC BLOOD PRESSURE: 148 MMHG | DIASTOLIC BLOOD PRESSURE: 69 MMHG | TEMPERATURE: 98 F

## 2017-09-23 LAB
ANION GAP SERPL CALC-SCNC: 13 MMOL/L — SIGNIFICANT CHANGE UP (ref 5–17)
BUN SERPL-MCNC: 18 MG/DL — SIGNIFICANT CHANGE UP (ref 7–23)
CALCIUM SERPL-MCNC: 9 MG/DL — SIGNIFICANT CHANGE UP (ref 8.4–10.5)
CHLORIDE SERPL-SCNC: 101 MMOL/L — SIGNIFICANT CHANGE UP (ref 96–108)
CO2 SERPL-SCNC: 27 MMOL/L — SIGNIFICANT CHANGE UP (ref 22–31)
CREAT SERPL-MCNC: 0.79 MG/DL — SIGNIFICANT CHANGE UP (ref 0.5–1.3)
GLUCOSE SERPL-MCNC: 161 MG/DL — HIGH (ref 70–99)
HCT VFR BLD CALC: 35.4 % — SIGNIFICANT CHANGE UP (ref 34.5–45)
HGB BLD-MCNC: 10.8 G/DL — LOW (ref 11.5–15.5)
MAGNESIUM SERPL-MCNC: 2.2 MG/DL — SIGNIFICANT CHANGE UP (ref 1.6–2.6)
MCHC RBC-ENTMCNC: 24.8 PG — LOW (ref 27–34)
MCHC RBC-ENTMCNC: 30.5 G/DL — LOW (ref 32–36)
MCV RBC AUTO: 81.2 FL — SIGNIFICANT CHANGE UP (ref 80–100)
PLATELET # BLD AUTO: 234 K/UL — SIGNIFICANT CHANGE UP (ref 150–400)
POTASSIUM SERPL-MCNC: 4.4 MMOL/L — SIGNIFICANT CHANGE UP (ref 3.5–5.3)
POTASSIUM SERPL-SCNC: 4.4 MMOL/L — SIGNIFICANT CHANGE UP (ref 3.5–5.3)
RBC # BLD: 4.36 M/UL — SIGNIFICANT CHANGE UP (ref 3.8–5.2)
RBC # FLD: 15.3 % — SIGNIFICANT CHANGE UP (ref 10.3–16.9)
SODIUM SERPL-SCNC: 141 MMOL/L — SIGNIFICANT CHANGE UP (ref 135–145)
WBC # BLD: 7.7 K/UL — SIGNIFICANT CHANGE UP (ref 3.8–10.5)
WBC # FLD AUTO: 7.7 K/UL — SIGNIFICANT CHANGE UP (ref 3.8–10.5)

## 2017-09-23 RX ORDER — CIPROFLOXACIN LACTATE 400MG/40ML
1 VIAL (ML) INTRAVENOUS
Qty: 5 | Refills: 0 | OUTPATIENT
Start: 2017-09-23 | End: 2017-09-28

## 2017-09-23 RX ADMIN — ALBUTEROL 2.5 MILLIGRAM(S): 90 AEROSOL, METERED ORAL at 06:13

## 2017-09-23 RX ADMIN — Medication 150 MILLIGRAM(S): at 06:14

## 2017-09-23 RX ADMIN — Medication 6: at 12:43

## 2017-09-23 RX ADMIN — MONTELUKAST 10 MILLIGRAM(S): 4 TABLET, CHEWABLE ORAL at 11:15

## 2017-09-23 RX ADMIN — LORATADINE 10 MILLIGRAM(S): 10 TABLET ORAL at 11:15

## 2017-09-23 RX ADMIN — Medication 0.25 MILLIGRAM(S): at 06:13

## 2017-09-23 RX ADMIN — TIOTROPIUM BROMIDE 1 CAPSULE(S): 18 CAPSULE ORAL; RESPIRATORY (INHALATION) at 11:16

## 2017-09-23 RX ADMIN — APIXABAN 5 MILLIGRAM(S): 2.5 TABLET, FILM COATED ORAL at 06:14

## 2017-09-23 RX ADMIN — ALBUTEROL 2.5 MILLIGRAM(S): 90 AEROSOL, METERED ORAL at 13:14

## 2017-09-23 RX ADMIN — PANTOPRAZOLE SODIUM 40 MILLIGRAM(S): 20 TABLET, DELAYED RELEASE ORAL at 06:13

## 2017-09-23 RX ADMIN — ALBUTEROL 2.5 MILLIGRAM(S): 90 AEROSOL, METERED ORAL at 09:32

## 2017-09-23 RX ADMIN — BUDESONIDE AND FORMOTEROL FUMARATE DIHYDRATE 2 PUFF(S): 160; 4.5 AEROSOL RESPIRATORY (INHALATION) at 06:20

## 2017-09-23 RX ADMIN — Medication 40 MILLIGRAM(S): at 06:13

## 2017-09-23 RX ADMIN — Medication 2: at 08:35

## 2017-09-25 LAB
CULTURE RESULTS: SIGNIFICANT CHANGE UP
SPECIMEN SOURCE: SIGNIFICANT CHANGE UP

## 2017-09-26 ENCOUNTER — APPOINTMENT (OUTPATIENT)
Dept: PULMONOLOGY | Facility: CLINIC | Age: 67
End: 2017-09-26
Payer: MEDICARE

## 2017-09-26 VITALS
OXYGEN SATURATION: 97 % | BODY MASS INDEX: 26.53 KG/M2 | HEART RATE: 72 BPM | DIASTOLIC BLOOD PRESSURE: 70 MMHG | HEIGHT: 67 IN | RESPIRATION RATE: 15 BRPM | SYSTOLIC BLOOD PRESSURE: 120 MMHG | WEIGHT: 169 LBS

## 2017-09-26 DIAGNOSIS — Z92.21 PERSONAL HISTORY OF ANTINEOPLASTIC CHEMOTHERAPY: ICD-10-CM

## 2017-09-26 DIAGNOSIS — Z85.038 PERSONAL HISTORY OF OTHER MALIGNANT NEOPLASM OF LARGE INTESTINE: ICD-10-CM

## 2017-09-26 DIAGNOSIS — I10 ESSENTIAL (PRIMARY) HYPERTENSION: ICD-10-CM

## 2017-09-26 DIAGNOSIS — Z79.01 LONG TERM (CURRENT) USE OF ANTICOAGULANTS: ICD-10-CM

## 2017-09-26 DIAGNOSIS — E27.40 UNSPECIFIED ADRENOCORTICAL INSUFFICIENCY: ICD-10-CM

## 2017-09-26 DIAGNOSIS — Z88.0 ALLERGY STATUS TO PENICILLIN: ICD-10-CM
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
## 2017-09-26 DIAGNOSIS — Z88.1 ALLERGY STATUS TO OTHER ANTIBIOTIC AGENTS STATUS: ICD-10-CM

## 2017-09-26 DIAGNOSIS — J95.89 OTHER POSTPROCEDURAL COMPLICATIONS AND DISORDERS OF RESPIRATORY SYSTEM, NOT ELSEWHERE CLASSIFIED: ICD-10-CM

## 2017-09-26 DIAGNOSIS — K21.9 GASTRO-ESOPHAGEAL REFLUX DISEASE WITHOUT ESOPHAGITIS: ICD-10-CM
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
## 2017-09-26 DIAGNOSIS — J44.1 CHRONIC OBSTRUCTIVE PULMONARY DISEASE WITH (ACUTE) EXACERBATION: ICD-10-CM

## 2017-09-26 DIAGNOSIS — Z79.4 LONG TERM (CURRENT) USE OF INSULIN: ICD-10-CM
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
## 2017-09-26 DIAGNOSIS — I48.2 CHRONIC ATRIAL FIBRILLATION: ICD-10-CM

## 2017-09-26 DIAGNOSIS — J98.09 OTHER DISEASES OF BRONCHUS, NOT ELSEWHERE CLASSIFIED: ICD-10-CM

## 2017-09-26 DIAGNOSIS — Z91.041 RADIOGRAPHIC DYE ALLERGY STATUS: ICD-10-CM

## 2017-09-26 DIAGNOSIS — G62.9 POLYNEUROPATHY, UNSPECIFIED: ICD-10-CM

## 2017-09-26 DIAGNOSIS — Z91.013 ALLERGY TO SEAFOOD: ICD-10-CM

## 2017-09-26 DIAGNOSIS — Z92.3 PERSONAL HISTORY OF IRRADIATION: ICD-10-CM

## 2017-09-26 DIAGNOSIS — Z79.52 LONG TERM (CURRENT) USE OF SYSTEMIC STEROIDS: ICD-10-CM

## 2017-09-26 DIAGNOSIS — I35.1 NONRHEUMATIC AORTIC (VALVE) INSUFFICIENCY: ICD-10-CM

## 2017-09-26 DIAGNOSIS — E11.65 TYPE 2 DIABETES MELLITUS WITH HYPERGLYCEMIA: ICD-10-CM

## 2017-09-26 DIAGNOSIS — R05 COUGH: ICD-10-CM

## 2017-09-26 DIAGNOSIS — J69.0 PNEUMONITIS DUE TO INHALATION OF FOOD AND VOMIT: ICD-10-CM

## 2017-09-26 DIAGNOSIS — Z88.6 ALLERGY STATUS TO ANALGESIC AGENT: ICD-10-CM

## 2017-09-26 LAB
CULTURE RESULTS: SIGNIFICANT CHANGE UP
SPECIMEN SOURCE: SIGNIFICANT CHANGE UP

## 2017-09-26 PROCEDURE — 99214 OFFICE O/P EST MOD 30 MIN: CPT | Mod: 25

## 2017-09-26 PROCEDURE — 96372 THER/PROPH/DIAG INJ SC/IM: CPT

## 2017-09-26 PROCEDURE — 71020: CPT

## 2017-09-26 RX ORDER — OMALIZUMAB 202.5 MG/1.4ML
150 INJECTION, SOLUTION SUBCUTANEOUS
Qty: 45 | Refills: 0 | Status: COMPLETED | OUTPATIENT
Start: 2017-09-26

## 2017-09-26 RX ADMIN — OMALIZUMAB 0 MG: 202.5 INJECTION, SOLUTION SUBCUTANEOUS at 00:00

## 2017-09-28 ENCOUNTER — APPOINTMENT (OUTPATIENT)
Dept: THORACIC SURGERY | Facility: CLINIC | Age: 67
End: 2017-09-28
Payer: MEDICARE

## 2017-09-28 VITALS
HEART RATE: 66 BPM | DIASTOLIC BLOOD PRESSURE: 61 MMHG | BODY MASS INDEX: 25.84 KG/M2 | TEMPERATURE: 98.3 F | WEIGHT: 165 LBS | OXYGEN SATURATION: 97 % | SYSTOLIC BLOOD PRESSURE: 131 MMHG

## 2017-09-28 PROCEDURE — 99214 OFFICE O/P EST MOD 30 MIN: CPT

## 2017-10-03 ENCOUNTER — APPOINTMENT (OUTPATIENT)
Dept: RADIATION ONCOLOGY | Facility: CLINIC | Age: 67
End: 2017-10-03
Payer: MEDICARE

## 2017-10-03 VITALS
HEIGHT: 67 IN | BODY MASS INDEX: 26.52 KG/M2 | OXYGEN SATURATION: 97 % | SYSTOLIC BLOOD PRESSURE: 134 MMHG | DIASTOLIC BLOOD PRESSURE: 75 MMHG | HEART RATE: 60 BPM | WEIGHT: 168.96 LBS

## 2017-10-03 PROCEDURE — 99214 OFFICE O/P EST MOD 30 MIN: CPT

## 2017-10-04 ENCOUNTER — CHART COPY (OUTPATIENT)
Age: 67
End: 2017-10-04

## 2017-10-08 ENCOUNTER — FORM ENCOUNTER (OUTPATIENT)
Age: 67
End: 2017-10-08

## 2017-10-09 ENCOUNTER — OUTPATIENT (OUTPATIENT)
Dept: OUTPATIENT SERVICES | Facility: HOSPITAL | Age: 67
LOS: 1 days | End: 2017-10-09
Payer: MEDICARE

## 2017-10-09 ENCOUNTER — APPOINTMENT (OUTPATIENT)
Dept: MRI IMAGING | Facility: CLINIC | Age: 67
End: 2017-10-09
Payer: MEDICARE

## 2017-10-09 DIAGNOSIS — C21.1 MALIGNANT NEOPLASM OF ANAL CANAL: ICD-10-CM

## 2017-10-09 DIAGNOSIS — H05.352 EXOSTOSIS OF LEFT ORBIT: Chronic | ICD-10-CM

## 2017-10-09 DIAGNOSIS — Z00.8 ENCOUNTER FOR OTHER GENERAL EXAMINATION: ICD-10-CM

## 2017-10-09 DIAGNOSIS — Z98.89 OTHER SPECIFIED POSTPROCEDURAL STATES: Chronic | ICD-10-CM

## 2017-10-09 DIAGNOSIS — Z96.653 PRESENCE OF ARTIFICIAL KNEE JOINT, BILATERAL: Chronic | ICD-10-CM

## 2017-10-09 PROCEDURE — 72197 MRI PELVIS W/O & W/DYE: CPT

## 2017-10-09 PROCEDURE — 72197 MRI PELVIS W/O & W/DYE: CPT | Mod: 26

## 2017-10-09 PROCEDURE — A9585: CPT

## 2017-10-10 ENCOUNTER — MED ADMIN CHARGE (OUTPATIENT)
Age: 67
End: 2017-10-10

## 2017-10-10 ENCOUNTER — APPOINTMENT (OUTPATIENT)
Dept: PULMONOLOGY | Facility: CLINIC | Age: 67
End: 2017-10-10

## 2017-10-10 ENCOUNTER — APPOINTMENT (OUTPATIENT)
Dept: PULMONOLOGY | Facility: CLINIC | Age: 67
End: 2017-10-10
Payer: MEDICARE

## 2017-10-10 VITALS
HEART RATE: 76 BPM | DIASTOLIC BLOOD PRESSURE: 60 MMHG | OXYGEN SATURATION: 97 % | HEIGHT: 67 IN | BODY MASS INDEX: 26.37 KG/M2 | RESPIRATION RATE: 16 BRPM | WEIGHT: 168 LBS | SYSTOLIC BLOOD PRESSURE: 140 MMHG

## 2017-10-10 PROCEDURE — 94010 BREATHING CAPACITY TEST: CPT

## 2017-10-10 PROCEDURE — 94727 GAS DIL/WSHOT DETER LNG VOL: CPT

## 2017-10-10 PROCEDURE — 94729 DIFFUSING CAPACITY: CPT

## 2017-10-10 PROCEDURE — 96372 THER/PROPH/DIAG INJ SC/IM: CPT

## 2017-10-10 PROCEDURE — 99212 OFFICE O/P EST SF 10 MIN: CPT | Mod: 25

## 2017-10-10 RX ORDER — OMALIZUMAB 202.5 MG/1.4ML
150 INJECTION, SOLUTION SUBCUTANEOUS
Qty: 1 | Refills: 0 | Status: COMPLETED | OUTPATIENT
Start: 2017-10-10

## 2017-10-10 RX ADMIN — OMALIZUMAB 0 MG: 202.5 INJECTION, SOLUTION SUBCUTANEOUS at 00:00

## 2017-10-19 LAB
CULTURE RESULTS: SIGNIFICANT CHANGE UP
SPECIMEN SOURCE: SIGNIFICANT CHANGE UP

## 2017-10-19 PROCEDURE — 85027 COMPLETE CBC AUTOMATED: CPT

## 2017-10-19 PROCEDURE — 87206 SMEAR FLUORESCENT/ACID STAI: CPT

## 2017-10-19 PROCEDURE — 36415 COLL VENOUS BLD VENIPUNCTURE: CPT

## 2017-10-19 PROCEDURE — 87040 BLOOD CULTURE FOR BACTERIA: CPT

## 2017-10-19 PROCEDURE — 85610 PROTHROMBIN TIME: CPT

## 2017-10-19 PROCEDURE — 87070 CULTURE OTHR SPECIMN AEROBIC: CPT

## 2017-10-19 PROCEDURE — 86803 HEPATITIS C AB TEST: CPT

## 2017-10-19 PROCEDURE — 85025 COMPLETE CBC W/AUTO DIFF WBC: CPT

## 2017-10-19 PROCEDURE — 87186 SC STD MICRODIL/AGAR DIL: CPT

## 2017-10-19 PROCEDURE — 87015 SPECIMEN INFECT AGNT CONCNTJ: CPT

## 2017-10-19 PROCEDURE — 83735 ASSAY OF MAGNESIUM: CPT

## 2017-10-19 PROCEDURE — 94640 AIRWAY INHALATION TREATMENT: CPT

## 2017-10-19 PROCEDURE — 87102 FUNGUS ISOLATION CULTURE: CPT

## 2017-10-19 PROCEDURE — 80048 BASIC METABOLIC PNL TOTAL CA: CPT

## 2017-10-19 PROCEDURE — 87116 MYCOBACTERIA CULTURE: CPT

## 2017-10-19 PROCEDURE — 93005 ELECTROCARDIOGRAM TRACING: CPT

## 2017-10-19 PROCEDURE — 99285 EMERGENCY DEPT VISIT HI MDM: CPT | Mod: 25

## 2017-10-19 PROCEDURE — 96374 THER/PROPH/DIAG INJ IV PUSH: CPT

## 2017-10-19 PROCEDURE — 71045 X-RAY EXAM CHEST 1 VIEW: CPT

## 2017-10-25 ENCOUNTER — FORM ENCOUNTER (OUTPATIENT)
Age: 67
End: 2017-10-25

## 2017-10-25 ENCOUNTER — OUTPATIENT (OUTPATIENT)
Dept: OUTPATIENT SERVICES | Facility: HOSPITAL | Age: 67
LOS: 1 days | Discharge: HOME | End: 2017-10-25

## 2017-10-25 DIAGNOSIS — Z96.653 PRESENCE OF ARTIFICIAL KNEE JOINT, BILATERAL: Chronic | ICD-10-CM

## 2017-10-25 DIAGNOSIS — H05.352 EXOSTOSIS OF LEFT ORBIT: Chronic | ICD-10-CM

## 2017-10-25 DIAGNOSIS — Z98.89 OTHER SPECIFIED POSTPROCEDURAL STATES: Chronic | ICD-10-CM

## 2017-10-25 DIAGNOSIS — C18.9 MALIGNANT NEOPLASM OF COLON, UNSPECIFIED: ICD-10-CM

## 2017-10-26 ENCOUNTER — APPOINTMENT (OUTPATIENT)
Dept: THORACIC SURGERY | Facility: CLINIC | Age: 67
End: 2017-10-26
Payer: MEDICARE

## 2017-10-26 ENCOUNTER — OUTPATIENT (OUTPATIENT)
Dept: OUTPATIENT SERVICES | Facility: HOSPITAL | Age: 67
LOS: 1 days | End: 2017-10-26
Payer: MEDICARE

## 2017-10-26 VITALS
WEIGHT: 169 LBS | TEMPERATURE: 97.7 F | HEART RATE: 71 BPM | SYSTOLIC BLOOD PRESSURE: 155 MMHG | RESPIRATION RATE: 18 BRPM | OXYGEN SATURATION: 96 % | DIASTOLIC BLOOD PRESSURE: 69 MMHG | BODY MASS INDEX: 26.47 KG/M2

## 2017-10-26 DIAGNOSIS — Z98.89 OTHER SPECIFIED POSTPROCEDURAL STATES: Chronic | ICD-10-CM

## 2017-10-26 DIAGNOSIS — Z96.653 PRESENCE OF ARTIFICIAL KNEE JOINT, BILATERAL: Chronic | ICD-10-CM

## 2017-10-26 DIAGNOSIS — H05.352 EXOSTOSIS OF LEFT ORBIT: Chronic | ICD-10-CM

## 2017-10-26 PROCEDURE — 99213 OFFICE O/P EST LOW 20 MIN: CPT

## 2017-10-26 PROCEDURE — 71020: CPT | Mod: 26

## 2017-10-26 PROCEDURE — 71046 X-RAY EXAM CHEST 2 VIEWS: CPT

## 2017-10-27 ENCOUNTER — APPOINTMENT (OUTPATIENT)
Dept: PULMONOLOGY | Facility: CLINIC | Age: 67
End: 2017-10-27
Payer: MEDICARE

## 2017-10-27 VITALS
HEIGHT: 67 IN | SYSTOLIC BLOOD PRESSURE: 140 MMHG | DIASTOLIC BLOOD PRESSURE: 74 MMHG | RESPIRATION RATE: 17 BRPM | HEART RATE: 79 BPM | BODY MASS INDEX: 26.53 KG/M2 | OXYGEN SATURATION: 96 % | WEIGHT: 169 LBS

## 2017-10-27 PROCEDURE — 96372 THER/PROPH/DIAG INJ SC/IM: CPT

## 2017-10-27 PROCEDURE — 90662 IIV NO PRSV INCREASED AG IM: CPT

## 2017-10-27 PROCEDURE — G0008: CPT

## 2017-10-27 PROCEDURE — 99214 OFFICE O/P EST MOD 30 MIN: CPT | Mod: 25

## 2017-10-27 RX ORDER — OMALIZUMAB 202.5 MG/1.4ML
150 INJECTION, SOLUTION SUBCUTANEOUS
Qty: 45 | Refills: 0 | Status: COMPLETED | OUTPATIENT
Start: 2017-10-27

## 2017-10-27 RX ADMIN — OMALIZUMAB 0 MG: 202.5 INJECTION, SOLUTION SUBCUTANEOUS at 00:00

## 2017-10-31 ENCOUNTER — EMERGENCY (EMERGENCY)
Facility: HOSPITAL | Age: 67
LOS: 1 days | End: 2017-10-31
Attending: EMERGENCY MEDICINE
Payer: MEDICARE

## 2017-10-31 ENCOUNTER — APPOINTMENT (OUTPATIENT)
Dept: HEMATOLOGY ONCOLOGY | Facility: CLINIC | Age: 67
End: 2017-10-31
Payer: MEDICARE

## 2017-10-31 VITALS
DIASTOLIC BLOOD PRESSURE: 81 MMHG | RESPIRATION RATE: 16 BRPM | HEART RATE: 75 BPM | OXYGEN SATURATION: 95 % | TEMPERATURE: 97.9 F | SYSTOLIC BLOOD PRESSURE: 193 MMHG

## 2017-10-31 VITALS — DIASTOLIC BLOOD PRESSURE: 70 MMHG | RESPIRATION RATE: 16 BRPM | HEART RATE: 82 BPM | SYSTOLIC BLOOD PRESSURE: 168 MMHG

## 2017-10-31 DIAGNOSIS — I35.1 NONRHEUMATIC AORTIC (VALVE) INSUFFICIENCY: ICD-10-CM

## 2017-10-31 DIAGNOSIS — Z98.89 OTHER SPECIFIED POSTPROCEDURAL STATES: Chronic | ICD-10-CM

## 2017-10-31 DIAGNOSIS — Z96.653 PRESENCE OF ARTIFICIAL KNEE JOINT, BILATERAL: Chronic | ICD-10-CM

## 2017-10-31 DIAGNOSIS — J43.2 CENTRILOBULAR EMPHYSEMA: ICD-10-CM

## 2017-10-31 DIAGNOSIS — E27.40 UNSPECIFIED ADRENOCORTICAL INSUFFICIENCY: ICD-10-CM

## 2017-10-31 DIAGNOSIS — G45.9 TRANSIENT CEREBRAL ISCHEMIC ATTACK, UNSPECIFIED: ICD-10-CM

## 2017-10-31 DIAGNOSIS — C19 MALIGNANT NEOPLASM OF RECTOSIGMOID JUNCTION: ICD-10-CM

## 2017-10-31 DIAGNOSIS — J39.8 OTHER SPECIFIED DISEASES OF UPPER RESPIRATORY TRACT: ICD-10-CM

## 2017-10-31 DIAGNOSIS — H05.352 EXOSTOSIS OF LEFT ORBIT: Chronic | ICD-10-CM

## 2017-10-31 DIAGNOSIS — Z29.9 ENCOUNTER FOR PROPHYLACTIC MEASURES, UNSPECIFIED: ICD-10-CM

## 2017-10-31 LAB
ALBUMIN SERPL ELPH-MCNC: 3.7 G/DL — SIGNIFICANT CHANGE UP (ref 3.3–5)
ALP SERPL-CCNC: 108 U/L — SIGNIFICANT CHANGE UP (ref 40–120)
ALT FLD-CCNC: 11 U/L RC — SIGNIFICANT CHANGE UP (ref 10–45)
ANION GAP SERPL CALC-SCNC: 11 MMOL/L — SIGNIFICANT CHANGE UP (ref 5–17)
APTT BLD: 40.7 SEC — HIGH (ref 27.5–37.4)
AST SERPL-CCNC: 12 U/L — SIGNIFICANT CHANGE UP (ref 10–40)
BASOPHILS # BLD AUTO: 0 K/UL — SIGNIFICANT CHANGE UP (ref 0–0.2)
BASOPHILS NFR BLD AUTO: 0.2 % — SIGNIFICANT CHANGE UP (ref 0–2)
BILIRUB SERPL-MCNC: 0.2 MG/DL — SIGNIFICANT CHANGE UP (ref 0.2–1.2)
BLD GP AB SCN SERPL QL: NEGATIVE — SIGNIFICANT CHANGE UP
BUN SERPL-MCNC: 12 MG/DL — SIGNIFICANT CHANGE UP (ref 7–23)
CALCIUM SERPL-MCNC: 9 MG/DL — SIGNIFICANT CHANGE UP (ref 8.4–10.5)
CHLORIDE SERPL-SCNC: 103 MMOL/L — SIGNIFICANT CHANGE UP (ref 96–108)
CK MB BLD-MCNC: 2.5 % — SIGNIFICANT CHANGE UP (ref 0–3.5)
CK MB CFR SERPL CALC: 3.4 NG/ML — SIGNIFICANT CHANGE UP (ref 0–3.8)
CK SERPL-CCNC: 134 U/L — SIGNIFICANT CHANGE UP (ref 25–170)
CO2 SERPL-SCNC: 27 MMOL/L — SIGNIFICANT CHANGE UP (ref 22–31)
CREAT SERPL-MCNC: 0.76 MG/DL — SIGNIFICANT CHANGE UP (ref 0.5–1.3)
EOSINOPHIL # BLD AUTO: 0.1 K/UL — SIGNIFICANT CHANGE UP (ref 0–0.5)
EOSINOPHIL NFR BLD AUTO: 1.2 % — SIGNIFICANT CHANGE UP (ref 0–6)
GLUCOSE SERPL-MCNC: 328 MG/DL — HIGH (ref 70–99)
HCT VFR BLD CALC: 41.5 % — SIGNIFICANT CHANGE UP (ref 34.5–45)
HGB BLD-MCNC: 12.7 G/DL — SIGNIFICANT CHANGE UP (ref 11.5–15.5)
INR BLD: 1.33 RATIO — HIGH (ref 0.88–1.16)
LYMPHOCYTES # BLD AUTO: 0.7 K/UL — LOW (ref 1–3.3)
LYMPHOCYTES # BLD AUTO: 8.9 % — LOW (ref 13–44)
MCHC RBC-ENTMCNC: 24.4 PG — LOW (ref 27–34)
MCHC RBC-ENTMCNC: 30.5 GM/DL — LOW (ref 32–36)
MCV RBC AUTO: 79.9 FL — LOW (ref 80–100)
MONOCYTES # BLD AUTO: 0.8 K/UL — SIGNIFICANT CHANGE UP (ref 0–0.9)
MONOCYTES NFR BLD AUTO: 9.5 % — SIGNIFICANT CHANGE UP (ref 2–14)
NEUTROPHILS # BLD AUTO: 6.5 K/UL — SIGNIFICANT CHANGE UP (ref 1.8–7.4)
NEUTROPHILS NFR BLD AUTO: 80.1 % — HIGH (ref 43–77)
PLATELET # BLD AUTO: 207 K/UL — SIGNIFICANT CHANGE UP (ref 150–400)
POTASSIUM SERPL-MCNC: 4.5 MMOL/L — SIGNIFICANT CHANGE UP (ref 3.5–5.3)
POTASSIUM SERPL-SCNC: 4.5 MMOL/L — SIGNIFICANT CHANGE UP (ref 3.5–5.3)
PROT SERPL-MCNC: 6.7 G/DL — SIGNIFICANT CHANGE UP (ref 6–8.3)
PROTHROM AB SERPL-ACNC: 14.5 SEC — HIGH (ref 9.8–12.7)
RBC # BLD: 5.19 M/UL — SIGNIFICANT CHANGE UP (ref 3.8–5.2)
RBC # FLD: 13.5 % — SIGNIFICANT CHANGE UP (ref 10.3–14.5)
RH IG SCN BLD-IMP: POSITIVE — SIGNIFICANT CHANGE UP
RH IG SCN BLD-IMP: POSITIVE — SIGNIFICANT CHANGE UP
SODIUM SERPL-SCNC: 141 MMOL/L — SIGNIFICANT CHANGE UP (ref 135–145)
TROPONIN T SERPL-MCNC: <0.01 NG/ML — SIGNIFICANT CHANGE UP (ref 0–0.06)
TROPONIN T SERPL-MCNC: <0.01 NG/ML — SIGNIFICANT CHANGE UP (ref 0–0.06)
WBC # BLD: 8.1 K/UL — SIGNIFICANT CHANGE UP (ref 3.8–10.5)
WBC # FLD AUTO: 8.1 K/UL — SIGNIFICANT CHANGE UP (ref 3.8–10.5)

## 2017-10-31 PROCEDURE — 99215 OFFICE O/P EST HI 40 MIN: CPT

## 2017-10-31 PROCEDURE — 70547 MR ANGIOGRAPHY NECK W/O DYE: CPT | Mod: 26

## 2017-10-31 PROCEDURE — 99285 EMERGENCY DEPT VISIT HI MDM: CPT | Mod: GC

## 2017-10-31 PROCEDURE — 93010 ELECTROCARDIOGRAM REPORT: CPT

## 2017-10-31 PROCEDURE — 70544 MR ANGIOGRAPHY HEAD W/O DYE: CPT | Mod: 26,59

## 2017-10-31 PROCEDURE — 70450 CT HEAD/BRAIN W/O DYE: CPT | Mod: 26

## 2017-10-31 PROCEDURE — 71010: CPT | Mod: 26

## 2017-10-31 PROCEDURE — 70551 MRI BRAIN STEM W/O DYE: CPT | Mod: 26

## 2017-10-31 PROCEDURE — 99220: CPT | Mod: 25,GC

## 2017-10-31 PROCEDURE — 93010 ELECTROCARDIOGRAM REPORT: CPT | Mod: 77

## 2017-10-31 RX ORDER — DEXTROSE 50 % IN WATER 50 %
12.5 SYRINGE (ML) INTRAVENOUS ONCE
Qty: 0 | Refills: 0 | Status: DISCONTINUED | OUTPATIENT
Start: 2017-10-31 | End: 2017-11-11

## 2017-10-31 RX ORDER — DIGOXIN 250 MCG
0.25 TABLET ORAL DAILY
Qty: 0 | Refills: 0 | Status: DISCONTINUED | OUTPATIENT
Start: 2017-10-31 | End: 2017-11-11

## 2017-10-31 RX ORDER — APIXABAN 2.5 MG/1
5 TABLET, FILM COATED ORAL EVERY 12 HOURS
Qty: 0 | Refills: 0 | Status: DISCONTINUED | OUTPATIENT
Start: 2017-10-31 | End: 2017-11-11

## 2017-10-31 RX ORDER — INSULIN LISPRO 100/ML
VIAL (ML) SUBCUTANEOUS
Qty: 0 | Refills: 0 | Status: DISCONTINUED | OUTPATIENT
Start: 2017-10-31 | End: 2017-11-11

## 2017-10-31 RX ORDER — TIOTROPIUM BROMIDE 18 UG/1
1 CAPSULE ORAL; RESPIRATORY (INHALATION) DAILY
Qty: 0 | Refills: 0 | Status: DISCONTINUED | OUTPATIENT
Start: 2017-10-31 | End: 2017-11-11

## 2017-10-31 RX ORDER — SODIUM CHLORIDE 9 MG/ML
1000 INJECTION, SOLUTION INTRAVENOUS
Qty: 0 | Refills: 0 | Status: DISCONTINUED | OUTPATIENT
Start: 2017-10-31 | End: 2017-11-11

## 2017-10-31 RX ORDER — DEXTROSE 50 % IN WATER 50 %
25 SYRINGE (ML) INTRAVENOUS ONCE
Qty: 0 | Refills: 0 | Status: DISCONTINUED | OUTPATIENT
Start: 2017-10-31 | End: 2017-11-11

## 2017-10-31 RX ORDER — INSULIN GLARGINE 100 [IU]/ML
12 INJECTION, SOLUTION SUBCUTANEOUS AT BEDTIME
Qty: 0 | Refills: 0 | Status: DISCONTINUED | OUTPATIENT
Start: 2017-10-31 | End: 2017-11-11

## 2017-10-31 RX ORDER — ALBUTEROL 90 UG/1
2.5 AEROSOL, METERED ORAL EVERY 6 HOURS
Qty: 0 | Refills: 0 | Status: DISCONTINUED | OUTPATIENT
Start: 2017-10-31 | End: 2017-11-11

## 2017-10-31 RX ORDER — LORATADINE 10 MG/1
10 TABLET ORAL DAILY
Qty: 0 | Refills: 0 | Status: DISCONTINUED | OUTPATIENT
Start: 2017-10-31 | End: 2017-11-11

## 2017-10-31 RX ORDER — PANTOPRAZOLE SODIUM 20 MG/1
40 TABLET, DELAYED RELEASE ORAL
Qty: 0 | Refills: 0 | Status: DISCONTINUED | OUTPATIENT
Start: 2017-10-31 | End: 2017-11-11

## 2017-10-31 RX ORDER — GLUCAGON INJECTION, SOLUTION 0.5 MG/.1ML
1 INJECTION, SOLUTION SUBCUTANEOUS ONCE
Qty: 0 | Refills: 0 | Status: DISCONTINUED | OUTPATIENT
Start: 2017-10-31 | End: 2017-11-11

## 2017-10-31 RX ORDER — SODIUM CHLORIDE 9 MG/ML
3 INJECTION INTRAMUSCULAR; INTRAVENOUS; SUBCUTANEOUS EVERY 8 HOURS
Qty: 0 | Refills: 0 | Status: DISCONTINUED | OUTPATIENT
Start: 2017-10-31 | End: 2017-11-11

## 2017-10-31 RX ORDER — BUDESONIDE AND FORMOTEROL FUMARATE DIHYDRATE 160; 4.5 UG/1; UG/1
2 AEROSOL RESPIRATORY (INHALATION)
Qty: 0 | Refills: 0 | Status: DISCONTINUED | OUTPATIENT
Start: 2017-10-31 | End: 2017-11-11

## 2017-10-31 RX ORDER — DEXTROSE 50 % IN WATER 50 %
1 SYRINGE (ML) INTRAVENOUS ONCE
Qty: 0 | Refills: 0 | Status: DISCONTINUED | OUTPATIENT
Start: 2017-10-31 | End: 2017-11-11

## 2017-10-31 RX ORDER — INSULIN LISPRO 100/ML
5 VIAL (ML) SUBCUTANEOUS
Qty: 0 | Refills: 0 | Status: DISCONTINUED | OUTPATIENT
Start: 2017-10-31 | End: 2017-11-11

## 2017-10-31 RX ORDER — MONTELUKAST 4 MG/1
10 TABLET, CHEWABLE ORAL AT BEDTIME
Qty: 0 | Refills: 0 | Status: DISCONTINUED | OUTPATIENT
Start: 2017-10-31 | End: 2017-11-11

## 2017-10-31 RX ADMIN — LORATADINE 10 MILLIGRAM(S): 10 TABLET ORAL at 22:39

## 2017-10-31 RX ADMIN — INSULIN GLARGINE 12 UNIT(S): 100 INJECTION, SOLUTION SUBCUTANEOUS at 22:40

## 2017-10-31 RX ADMIN — APIXABAN 5 MILLIGRAM(S): 2.5 TABLET, FILM COATED ORAL at 22:39

## 2017-10-31 RX ADMIN — Medication 1: at 19:24

## 2017-10-31 RX ADMIN — SODIUM CHLORIDE 30 MILLILITER(S): 9 INJECTION, SOLUTION INTRAVENOUS at 16:05

## 2017-10-31 RX ADMIN — Medication 150 MILLIGRAM(S): at 22:39

## 2017-10-31 RX ADMIN — SODIUM CHLORIDE 3 MILLILITER(S): 9 INJECTION INTRAMUSCULAR; INTRAVENOUS; SUBCUTANEOUS at 23:26

## 2017-10-31 RX ADMIN — SODIUM CHLORIDE 30 MILLILITER(S): 9 INJECTION, SOLUTION INTRAVENOUS at 14:51

## 2017-10-31 RX ADMIN — SODIUM CHLORIDE 30 MILLILITER(S): 9 INJECTION, SOLUTION INTRAVENOUS at 15:25

## 2017-10-31 RX ADMIN — Medication 5 UNIT(S): at 19:24

## 2017-10-31 RX ADMIN — MONTELUKAST 10 MILLIGRAM(S): 4 TABLET, CHEWABLE ORAL at 22:39

## 2017-10-31 NOTE — CONSULT NOTE ADULT - PROBLEM SELECTOR RECOMMENDATION 3
-As an outpatient she has been on Breo, Pulmicort, Singulair and Xolair  -in hospital would start Symbicort, Singulair and Spiriva unless patient can bring in her home meds  -would start Albuterol Q6  -incentive spirometer and acapella  -would hold off systemic steroids at this point  -check CXR, Legionella and RVP -As an outpatient she has been on Breo, Pulmicort, Singulair and Xolair  -in hospital would start Symbicort, Singulair and Spiriva unless patient can bring in her home meds  -would start Albuterol Q6  -incentive spirometer and acapella  -would hold off systemic steroids at this point  -outpatient followup for pulmonary/thoracic imaging and legionella testing

## 2017-10-31 NOTE — CONSULT NOTE ADULT - ASSESSMENT
68 y/o Rh woman with PMH sig for Afib on Eliquis p/w vertigo and visiual disturbances. On exam found to have mild RUE tremor which pt says in new. No other findings. NIHSS 1, MRS 0. Given hx of recurrent neurological symptoms would like to rule out TIA or acute stroke. Would also consider symptoms related to peripheral vertigo if workup negative.    - MRI head/ MRA head and neck   - check A1c and lipid panel.   - Start High dose Statin if LDL >70  - c/w ELiquis  - pt can follow up [facility] if MRI negative [phone]

## 2017-10-31 NOTE — ED CDU PROVIDER INITIAL DAY NOTE - OBJECTIVE STATEMENT
67F pmh colorectal cancer on chemo, DM, TIA, afib on eliquis p/w sudden onset at 11:30am of vertiginous symptoms described as room spinning, feeling off balance, and having floaters in the right eye.  Pt was at [facility] when symptoms started - she felt like the room was spinning and she couldn't walk straight.  When she laid down, room was still spinning and patient saw floaters and ambulance was called for transport to ED.  Pt denies any chest pain, shortness of breath, abd pain, nausea/vomiting/diarrhea, fever/chills.    In ED patient states she is feeling much better, with less dizziness, feels she is able to ambulate. Describes decreased number of floaters since onset of symptoms.

## 2017-10-31 NOTE — ED PROVIDER NOTE - OBJECTIVE STATEMENT
67F pmh colorectal cancer, TIA, afib on eliquis p/w sudden onset at 11:30am of vertiginous symptoms described as room spinning, feeling off balance, and having floaters in the right eye.  Pt was at [facility] when symptoms started and patient ambulance brought her to ED.  Pt 67F pmh colorectal cancer on chemo, DM, TIA, afib on eliquis p/w sudden onset at 11:30am of vertiginous symptoms described as room spinning, feeling off balance, and having floaters in the right eye.  Pt was at [facility] when symptoms started - she felt like the room was spinning and she couldn't walk straight.  When she laid down, room was still spinning and patient saw floaters and ambulance was called for transport to ED.  Pt denies any chest pain, shortness of breath, abd pain, nausea/vomiting/diarrhea, fever/chills.    - Akilah Hinojosa DO

## 2017-10-31 NOTE — ED CDU PROVIDER INITIAL DAY NOTE - PHYSICAL EXAMINATION
CN 2-12 grossly intact (patient only with one eye) Normal sensation and strength of upper and lower extremities b/l. FROM of neck, upper and lower extremities b/l.  Patient able to ambulate with normal gait, negative Momberg and pronator drift No focal neuro deficits.
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
## 2017-10-31 NOTE — ED PROVIDER NOTE - FAMILY HISTORY
Family history of asthma     Family history of diabetes mellitus type II     Sibling  Still living? Unknown  Family history of breast cancer, Age at diagnosis: Age Unknown

## 2017-10-31 NOTE — ED CDU PROVIDER INITIAL DAY NOTE - PROGRESS NOTE DETAILS
CDU NOTE KIANA Oconnor: pt resting comfortably, feels well, still reports not feeling completely herself but can't really decipher why. pt reports visual change/disturbance resolved and her vision to her R eye is good (pt does not have vision in L eye at baseline). Dizziness/unsteadiness also resolved. no new neurologic symptoms. NAD VSS. no events on tele. neuro exam- no focal deficit. I have personally performed a face to face diagnostic evaluation on this patient.  I have reviewed the ACP note and agree with the history, exam, and plan of care, except as noted.  History and Exam by me shows  Attn - pt feeling better. no dizziness.  Pt c/o blurred vision - Acuity 20/20 with hand held card.  MRI/MRA normal.  Neurology to reevaluate pt.  Pt stable for D/C pending neuro reeval.

## 2017-10-31 NOTE — CONSULT NOTE ADULT - SUBJECTIVE AND OBJECTIVE BOX
[facility] DIVISION OF PULMONARY, CRITICAL CARE AND SLEEP MEDICINE  PULMONARY CONSULTATION NOTE  PHONE NUMBER [phone] MONDAY-FRIDAY 8A-5P or [phone] on NIGHTS/WEEKENDS/HOLIDAYS    NAME: RAYRAY RODRIGUEZ  MEDICAL RECORD NUMBER: [id number]    CHIEF COMPLAINT: Patient is a 67y old  Female who presents with a chief complaint of dizziness/vertigo    HISTORY OF PRESENT ILLNESS: 67F COPD, Colorectal CA (chemo/XRT), TBM, Afib, DM who presents with sudden onset dizziness, vertigo with the sensation of the room spinning and floaters. She was brought to the [facility] ED and had a Stroke Code called. It was determined she was not a candidate for tPA and that possibly she had a TIA. She continued to have symptoms although much more mild in severity. She was at [facility] today when she was noted to have more difficulty walking and felt that she was unable to walk the hallways    She has a chronic cough but feels her sputum is slightly increased today. She denies hemoptysis. She denies fevers, chills or sick contacts. She denies nausea, vomiting or abdominal pain. She does state that she had a fever last week and called Dr. Allison and was given prescriptions for antibiotics (Avelox) and Tamiflu. She has not had significant change in her symptoms.    She has had tracheobronchoplasty for her TBM but continues to have evidence of TBM on bronchoscopy which is thought to improve over time. She was also recently at [facility] where she was found to have Acenitobacter in her sputum and treated with antibiotics.      ====================BACKGROUND INFORMATION============================    FAMILY HISTORY: FAMILY HISTORY:  Family history of breast cancer (Sibling)  Family history of asthma      PAST MEDICAL AND SURGICAL HISTORY: PAST MEDICAL & SURGICAL HISTORY:  Aortic disease: leaky valve  Colorectal cancer: 7/2017- last treatment , chemo and radiation  Tracheobronchomalacia  Asthma  Pelvic fracture  Aortic insufficiency: mod/severe AI on echo 9/25/16  Adrenal insufficiency  COPD (chronic obstructive pulmonary disease)  Diabetes: type two. insulin dependent  Atrial fibrillation  H/O pelvic surgery  Exostosis of orbit, left: left eye prosthetic  History of sinus surgery  H/O total knee replacement, bilateral  History of partial hysterectomy      ALLERGIES:Allergies    ampicillin (Short breath)  aspirin (Short breath)  Avelox (Short breath)  codeine (Short breath)  Dilaudid (Short breath)  iodine (Short breath)  penicillin (Short breath)  shellfish (Anaphylaxis)  tetanus toxoid (Short breath)  Valium (Short breath)    Intolerances    HOME MEDICATIONS: reviewed    OUTPATIENT PULMONARY DOCTOR: Dr. Allison    SOCIAL HISTORY:  TOBACCO USE: never  ALCOHOL: none  DRUGS: none  EMPLOYMENT: retired  EXPOSURES: none  RECENT TRAVELS: none  CODE STATUS: full code    ====================REVIEW OF SYSTEMS=====================================  CONSTITUTIONAL: dizziness/vertigo  CARDIOVASCULAR: denies CP or palpitations  PULMONARY: productive cough, no hemoptysis  GASTROINTESTINAL: loose BM, no abdominal pain  [x] ALL OTHER REVIEW OF SYSTEMS ARE NEGATIVE   [] UNABLE TO OBTAIN REVIEW OF SYSTEMS DUE TO ______________      ====================PHYSICAL EXAM=========================================    VITALS: ICU Vital Signs Last 24 Hrs  T(C): --  T(F): --  HR: 82 (31 Oct 2017 12:40) (82 - 82)  BP: 168/70 (31 Oct 2017 12:40) (168/70 - 168/70)  BP(mean): --  ABP: --  ABP(mean): --  RR: 16 (31 Oct 2017 12:40) (16 - 16)  SpO2: 99% on 2L      INTAKE and OUTPUT: I&O's Summary      WEIGHT: Daily     Daily     GLUCOSE: CAPILLARY BLOOD GLUCOSE  284 (31 Oct 2017 13:02)    VENTILATOR SETTINGS: N/A    GENERAL: AAOx3, NAD, laying comfotable  HEENT: normocephalic, atraumatic, MMM, nares clear, trachea midline,  NECK: supple, no JVD  LYMPH NODES: no palpable cervical LAD  CARDIOVASCULAR: RRR, S1S2, systolic murmur at aortic area  PULMONARY: coarse breath sounds that clear with cough, end expiratory wheeze  ABDOMEN: soft, NT, ND, +BS  SKIN: warm and dry  EXTREMITIES: no cyanosis or edema, +clubbing  NEUROLOGIC: moves all extremities, nonfocal exam  PSYCHIATRIC: calm    ====================MEDICATIONS===========================================  MEDICATIONS  (STANDING):  lactated ringers. 1000 milliLiter(s) (30 mL/Hr) IV Continuous <Continuous>    MEDICATIONS  (PRN):      ====================LABORATORY RESULTS====================================  CBC Full  -  ( 31 Oct 2017 14:20 )  WBC Count : 8.1 K/uL  Hemoglobin : 12.7 g/dL  Hematocrit : 41.5 %  Platelet Count - Automated : 207 K/uL  Mean Cell Volume : 79.9 fl  Mean Cell Hemoglobin : 24.4 pg  Mean Cell Hemoglobin Concentration : 30.5 gm/dL  Auto Neutrophil # : 6.5 K/uL  Auto Lymphocyte # : 0.7 K/uL  Auto Monocyte # : 0.8 K/uL  Auto Eosinophil # : 0.1 K/uL  Auto Basophil # : 0.0 K/uL  Auto Neutrophil % : 80.1 %  Auto Lymphocyte % : 8.9 %  Auto Monocyte % : 9.5 %  Auto Eosinophil % : 1.2 %  Auto Basophil % : 0.2 %                                        Creatinine Trend:       ====================RADIOLOGY and ECHOCARDIOGRAPHY=======================    CXR:    CT CHEST:    ECHOCARDIOGRAPHY:    POINT OF CARE ULTRASOUND:

## 2017-10-31 NOTE — ED ADULT NURSE REASSESSMENT NOTE - NS ED NURSE REASSESS COMMENT FT1
Pt received from RAHDA Hager. Pt oriented to CDU & plan of care was discussed. Pt AO4. Neuro check intact. No deficits noted except pt unable to see out of R eye. Pt denies dizziness at the moment. Pt denies any chest pain, SOB, dizziness or palpitations. Safety & comfort measures maintained. Call bell in reach. Will continue to monitor. Pt received from RADHA Hager. Pt oriented to CDU & plan of care was discussed. Pt AO4. Neuro check intact. No deficits noted except pt unable to see out of L eye. Pt denies dizziness at the moment. Pt denies any chest pain, SOB, dizziness or palpitations. Safety & comfort measures maintained. Call bell in reach. Will continue to monitor.

## 2017-10-31 NOTE — ED PROVIDER NOTE - PHYSICAL EXAMINATION
Gen: NAD, AOx3  Head: NCAT  HEENT: Left eye prosthesis, oral mucosa moist, normal conjunctiva  Lung: Course breath sounds bilaterally, no respiratory distress  CV: rrr, no murmurs, Normal perfusion  Abd: soft, NTND  MSK: No edema, no visible deformities  Neuro: No focal neurologic deficits, 5/5 strength all extremities, normal finger to nose, equal sensation all extremities  Skin: No rash   - Akilah Hinojosa, DO

## 2017-10-31 NOTE — ED PROVIDER NOTE - PROGRESS NOTE DETAILS
Pt has power port and is requesting we access power port and not attempt peripheral IV.  CXR performed, RN informed she may access power port.  - Akilah Hinojosa, DO Clarified with pulmonology that patient does not need further pulm work-up at this visit unless she ends up getting admitted after MRI.  Work up can be done as out-patient.   - Akilah Hinojosa,

## 2017-10-31 NOTE — ED CDU PROVIDER INITIAL DAY NOTE - ATTENDING CONTRIBUTION TO CARE
Attending MD Luis:   I personally have seen and examined this patient.  Physician assistant note reviewed and agree on plan of care and except where noted.  See HPI, PE, and MDM for details.

## 2017-10-31 NOTE — ED ADULT NURSE REASSESSMENT NOTE - GENERAL PATIENT STATE
improvement verbalized/comfortable appearance/cooperative
cooperative/comfortable appearance/smiling/interactive

## 2017-10-31 NOTE — CONSULT NOTE ADULT - ASSESSMENT
67F COPD, Colorectal CA (chemo/XRT), TBM, Afib, DM who presents with sudden onset dizziness, vertigo with the sensation of the room spinning and floaters likely due to TIA

## 2017-10-31 NOTE — ED PROVIDER NOTE - ATTENDING CONTRIBUTION TO CARE
Attending MD Luis:  I personally have seen and examined this patient.  Resident note reviewed and agree on plan of care and except where noted.  See HPI, PE, and MDM for details.

## 2017-10-31 NOTE — ED PROVIDER NOTE - MEDICAL DECISION MAKING DETAILS
Attending MD Luis: 67F with afib on eliquis, TIA, colorectal CA previously on chemo presenting with acute onset of dizziness perhaps described as vertigo, being off balance and right eye floaters that are new. Ddx includes TIA/CVA, primary optho such as retinal detachment/PVD, code stroke activated on arrival, pending code stroke team recs

## 2017-10-31 NOTE — ED CDU PROVIDER INITIAL DAY NOTE - DETAILS
1. Frequent reevaluations  2. Telemetry, Repeat trop/EKG  3. Neuro checks  4. MRI/MRA  5. Neuro following  Plan d/w Dr. Luis

## 2017-11-01 VITALS
HEART RATE: 70 BPM | RESPIRATION RATE: 17 BRPM | SYSTOLIC BLOOD PRESSURE: 147 MMHG | DIASTOLIC BLOOD PRESSURE: 83 MMHG | OXYGEN SATURATION: 95 % | TEMPERATURE: 98 F

## 2017-11-01 LAB
CHOLEST SERPL-MCNC: 197 MG/DL — SIGNIFICANT CHANGE UP (ref 10–199)
HBA1C BLD-MCNC: 8 % — HIGH (ref 4–5.6)
HDLC SERPL-MCNC: 46 MG/DL — SIGNIFICANT CHANGE UP (ref 40–125)
LIPID PNL WITH DIRECT LDL SERPL: 124 MG/DL — SIGNIFICANT CHANGE UP
TOTAL CHOLESTEROL/HDL RATIO MEASUREMENT: 4.3 RATIO — SIGNIFICANT CHANGE UP (ref 3.3–7.1)
TRIGL SERPL-MCNC: 134 MG/DL — SIGNIFICANT CHANGE UP (ref 10–149)

## 2017-11-01 PROCEDURE — 70547 MR ANGIOGRAPHY NECK W/O DYE: CPT

## 2017-11-01 PROCEDURE — 70450 CT HEAD/BRAIN W/O DYE: CPT

## 2017-11-01 PROCEDURE — 96372 THER/PROPH/DIAG INJ SC/IM: CPT

## 2017-11-01 PROCEDURE — 83036 HEMOGLOBIN GLYCOSYLATED A1C: CPT

## 2017-11-01 PROCEDURE — 84484 ASSAY OF TROPONIN QUANT: CPT

## 2017-11-01 PROCEDURE — 99284 EMERGENCY DEPT VISIT MOD MDM: CPT | Mod: 25

## 2017-11-01 PROCEDURE — 82962 GLUCOSE BLOOD TEST: CPT

## 2017-11-01 PROCEDURE — 70551 MRI BRAIN STEM W/O DYE: CPT

## 2017-11-01 PROCEDURE — 80061 LIPID PANEL: CPT

## 2017-11-01 PROCEDURE — 93005 ELECTROCARDIOGRAM TRACING: CPT | Mod: 76

## 2017-11-01 PROCEDURE — 99217: CPT | Mod: 25

## 2017-11-01 PROCEDURE — 70544 MR ANGIOGRAPHY HEAD W/O DYE: CPT

## 2017-11-01 PROCEDURE — 82550 ASSAY OF CK (CPK): CPT

## 2017-11-01 PROCEDURE — 85027 COMPLETE CBC AUTOMATED: CPT

## 2017-11-01 PROCEDURE — 85730 THROMBOPLASTIN TIME PARTIAL: CPT

## 2017-11-01 PROCEDURE — 82553 CREATINE MB FRACTION: CPT

## 2017-11-01 PROCEDURE — G0378: CPT

## 2017-11-01 PROCEDURE — 71045 X-RAY EXAM CHEST 1 VIEW: CPT

## 2017-11-01 PROCEDURE — 86900 BLOOD TYPING SEROLOGIC ABO: CPT

## 2017-11-01 PROCEDURE — 86850 RBC ANTIBODY SCREEN: CPT

## 2017-11-01 PROCEDURE — 86901 BLOOD TYPING SEROLOGIC RH(D): CPT

## 2017-11-01 PROCEDURE — 80053 COMPREHEN METABOLIC PANEL: CPT

## 2017-11-01 PROCEDURE — 85610 PROTHROMBIN TIME: CPT

## 2017-11-01 PROCEDURE — 94640 AIRWAY INHALATION TREATMENT: CPT

## 2017-11-01 RX ORDER — ACETAMINOPHEN 500 MG
650 TABLET ORAL ONCE
Qty: 0 | Refills: 0 | Status: COMPLETED | OUTPATIENT
Start: 2017-11-01 | End: 2017-11-01

## 2017-11-01 RX ADMIN — TIOTROPIUM BROMIDE 1 CAPSULE(S): 18 CAPSULE ORAL; RESPIRATORY (INHALATION) at 10:26

## 2017-11-01 RX ADMIN — Medication 650 MILLIGRAM(S): at 04:11

## 2017-11-01 RX ADMIN — Medication 0.25 MILLIGRAM(S): at 08:36

## 2017-11-01 RX ADMIN — Medication 75 MILLIGRAM(S): at 00:08

## 2017-11-01 RX ADMIN — Medication 75 MILLIGRAM(S): at 08:36

## 2017-11-01 RX ADMIN — LORATADINE 10 MILLIGRAM(S): 10 TABLET ORAL at 12:55

## 2017-11-01 RX ADMIN — BUDESONIDE AND FORMOTEROL FUMARATE DIHYDRATE 2 PUFF(S): 160; 4.5 AEROSOL RESPIRATORY (INHALATION) at 10:25

## 2017-11-01 RX ADMIN — PANTOPRAZOLE SODIUM 40 MILLIGRAM(S): 20 TABLET, DELAYED RELEASE ORAL at 08:35

## 2017-11-01 RX ADMIN — SODIUM CHLORIDE 3 MILLILITER(S): 9 INJECTION INTRAMUSCULAR; INTRAVENOUS; SUBCUTANEOUS at 07:05

## 2017-11-01 RX ADMIN — Medication 5 UNIT(S): at 09:17

## 2017-11-01 RX ADMIN — Medication 4 MILLIGRAM(S): at 08:36

## 2017-11-01 RX ADMIN — Medication 150 MILLIGRAM(S): at 10:25

## 2017-11-01 RX ADMIN — APIXABAN 5 MILLIGRAM(S): 2.5 TABLET, FILM COATED ORAL at 12:55

## 2017-11-01 NOTE — ED ADULT NURSE REASSESSMENT NOTE - NS ED NURSE REASSESS COMMENT FT1
Pt AO4. Neuro check intact. No deficits noted except unable to see out L eye. Safety maintained. Will continue to monitor.

## 2017-11-01 NOTE — ED CDU PROVIDER DISPOSITION NOTE - CLINICAL COURSE
66 y/o F h/o Asthma/COPD, Tracheobronchomalacia, Afib on Eliquis, Colorectal CA s/p treatment, Aortic Regurgitation c/o episode of dizziness, unsteadiness and blurred vision today while at her Doctor's office. A code stroke was called in the emergency department and immediate neurological consultation was obtained. CT head was negative for acute stroke. Patient’s symptoms were resolved in the ED and evaluation by neurology recommended patient come to CDU for further work-up. Patient was sent to CDU for Q4 neuro checks, telemetry monitoring and MRI/MRA H/N, as well as continued neurological evaluation. Pt remained asymptomatic throughout CDU stay, ambulating without difficulty and no return of neurological deficit.   ***to be done by AM PA - Patient was seen and evaluated again by neurology team with attending. MRIs negative for acute pathology. Neurology attending recommended ____. Cleared for follow-up in office as outpatient. 66 y/o F h/o Asthma/COPD, Tracheobronchomalacia, Afib on Eliquis, Colorectal CA s/p treatment, Aortic Regurgitation c/o episode of dizziness, unsteadiness and blurred vision today while at her Doctor's office. A code stroke was called in the emergency department and immediate neurological consultation was obtained. CT head was negative for acute stroke. Patient’s symptoms were resolved in the ED and evaluation by neurology recommended patient come to CDU for further work-up. Patient was sent to CDU for Q4 neuro checks, telemetry monitoring and MRI/MRA H/N, as well as continued neurological evaluation. Pt remained asymptomatic throughout CDU stay, ambulating without difficulty and no return of neurological deficit. Patient was seen and evaluated again by neurology team with attending. MRIs negative for acute pathology. Neurology attending recommended follow-up with general neurology clinic and possibly ENT for further evaluation and treatment of vertiginous symptoms. Cleared for follow-up in office as outpatient. Patient in understanding and agreement and after discussion with Dr. Walker, patient is stable for d/c at this time.

## 2017-11-01 NOTE — ED CDU PROVIDER SUBSEQUENT DAY NOTE - HISTORY
No interval changes since initial CDU provider note. Pt's initial symptoms of unsteadiness, dizziness, and blurred vision improved and then resolved while in ED. Since, pt has gradually continued to improve back to baseline. Pt feels well without complaint. denies visual change, numbness, paresthesias, weakness of arm/leg, unsteady gait. no new neurologic symptoms. pt ambulating without difficulty/issue. NAD VSS. no events on tele. neuro exam- no focal deficit, neuro intact.   -Anastasia BRUNO

## 2017-11-01 NOTE — ED CDU PROVIDER SUBSEQUENT DAY NOTE - PROGRESS NOTE DETAILS
Pt feels well except for HA requesting Tylenol. denies visual change, numbness, paresthesias, weakness of arm/leg, unsteady gait. no new neurologic symptoms. pt ambulating without difficulty/issue. NAD VSS. no events on tele. neuro exam- no focal deficit, neuro intact. Tylenol ordered.   -Anastasia BRUNO Pt feeling well at this time. MRIs are within normal limits. Discussed with stroke team who will come to reassess patient and provide instructions for discharge. Dr. Walker saw and evaluated patient and patient is stable for d/c pending neurology reeval. Patient reassessed by neurology, recommending general neurology follow-up and ENT follow-up for vertiginous symptoms. Patient reports that she understands and agrees with plan. Discussed with Dr. Walker and patient is stable for d/c at this time.

## 2017-11-01 NOTE — ED CDU PROVIDER DISPOSITION NOTE - PLAN OF CARE
1. stay hydrated.  2. continue current home medications  3. Follow up with your Internist Dr. Allison in 1 -2 days. And follow up elevated Hgb A1c found on blood work.  4. Follow up with Neurology #[phone] in 2-3 days.  5. Bring Printed Results to your Doctor Visits. Stroke Education given to you.  6. Return if symptoms worsen, fever, weakness, numbness, dizziness, vision change, slurred speech and all other concerns 1. stay hydrated.  2. continue current home medications  3. Follow up with your Internist Dr. Allison in 1 -2 days. And follow up elevated Hgb A1c found on blood work.  4. Follow up with Neurology #[phone] in 2-3 days. Follow-up with ENT clinic at [phone] for vertigo symptoms.   5. Bring Printed Results to your Doctor Visits. Stroke Education given to you.  6. Return if symptoms worsen, fever, weakness, numbness, dizziness, vision change, slurred speech and all other concerns

## 2017-11-01 NOTE — ED CDU PROVIDER DISPOSITION NOTE - ATTENDING CONTRIBUTION TO CARE
I have personally performed a face to face diagnostic evaluation on this patient.  I have reviewed the ACP note and agree with the history, exam, and plan of care, except as noted.  History and Exam by me shows  Attn - pt feeling better. no dizziness.  Pt c/o blurred vision - Acuity 20/20 with hand held card.  MRI/MRA normal.  Neurology to reevaluate pt.  Pt stable for D/C pending neuro reeval.      Pt reeval by Neuro and can discharge - pt stable for D/C.

## 2017-11-01 NOTE — ED ADULT NURSE REASSESSMENT NOTE - NS ED NURSE REASSESS COMMENT FT1
Pt AO4. Neuro check intact. No deficits noted except unable to see in L eye. Pt also complains of headche. KIANA Macario aware. Medicated as per MAR. Safety maintained. Will continue to monitor.

## 2017-11-04 ENCOUNTER — MOBILE ON CALL (OUTPATIENT)
Age: 67
End: 2017-11-04

## 2017-11-08 ENCOUNTER — RESULT REVIEW (OUTPATIENT)
Age: 67
End: 2017-11-08

## 2017-11-08 ENCOUNTER — APPOINTMENT (OUTPATIENT)
Dept: HEMATOLOGY ONCOLOGY | Facility: CLINIC | Age: 67
End: 2017-11-08
Payer: MEDICARE

## 2017-11-08 VITALS
WEIGHT: 168 LBS | DIASTOLIC BLOOD PRESSURE: 73 MMHG | BODY MASS INDEX: 26.31 KG/M2 | HEART RATE: 74 BPM | TEMPERATURE: 97.7 F | OXYGEN SATURATION: 96 % | SYSTOLIC BLOOD PRESSURE: 136 MMHG | RESPIRATION RATE: 16 BRPM

## 2017-11-08 LAB
BASOPHILS # BLD AUTO: 0 K/UL — SIGNIFICANT CHANGE UP (ref 0–0.2)
BASOPHILS NFR BLD AUTO: 0.1 % — SIGNIFICANT CHANGE UP (ref 0–2)
CULTURE RESULTS: SIGNIFICANT CHANGE UP
EOSINOPHIL # BLD AUTO: 0.1 K/UL — SIGNIFICANT CHANGE UP (ref 0–0.5)
EOSINOPHIL NFR BLD AUTO: 1.8 % — SIGNIFICANT CHANGE UP (ref 0–6)
HCT VFR BLD CALC: 39.1 % — SIGNIFICANT CHANGE UP (ref 34.5–45)
HGB BLD-MCNC: 12.9 G/DL — SIGNIFICANT CHANGE UP (ref 11.5–15.5)
LYMPHOCYTES # BLD AUTO: 0.7 K/UL — LOW (ref 1–3.3)
LYMPHOCYTES # BLD AUTO: 8.9 % — LOW (ref 13–44)
MCHC RBC-ENTMCNC: 25 PG — LOW (ref 27–34)
MCHC RBC-ENTMCNC: 32.9 G/DL — SIGNIFICANT CHANGE UP (ref 32–36)
MCV RBC AUTO: 75.9 FL — LOW (ref 80–100)
MONOCYTES # BLD AUTO: 0.6 K/UL — SIGNIFICANT CHANGE UP (ref 0–0.9)
MONOCYTES NFR BLD AUTO: 7.4 % — SIGNIFICANT CHANGE UP (ref 2–14)
NEUTROPHILS # BLD AUTO: 6.6 K/UL — SIGNIFICANT CHANGE UP (ref 1.8–7.4)
NEUTROPHILS NFR BLD AUTO: 81.7 % — HIGH (ref 43–77)
PLATELET # BLD AUTO: 262 K/UL — SIGNIFICANT CHANGE UP (ref 150–400)
RBC # BLD: 5.16 M/UL — SIGNIFICANT CHANGE UP (ref 3.8–5.2)
RBC # FLD: 13.5 % — SIGNIFICANT CHANGE UP (ref 10.3–14.5)
SPECIMEN SOURCE: SIGNIFICANT CHANGE UP
WBC # BLD: 8 K/UL — SIGNIFICANT CHANGE UP (ref 3.8–10.5)
WBC # FLD AUTO: 8 K/UL — SIGNIFICANT CHANGE UP (ref 3.8–10.5)

## 2017-11-08 PROCEDURE — 99215 OFFICE O/P EST HI 40 MIN: CPT

## 2017-11-09 ENCOUNTER — CHART COPY (OUTPATIENT)
Age: 67
End: 2017-11-09

## 2017-11-09 ENCOUNTER — APPOINTMENT (OUTPATIENT)
Dept: PULMONOLOGY | Facility: CLINIC | Age: 67
End: 2017-11-09
Payer: MEDICARE

## 2017-11-09 VITALS
BODY MASS INDEX: 26.37 KG/M2 | RESPIRATION RATE: 17 BRPM | HEIGHT: 67 IN | OXYGEN SATURATION: 96 % | WEIGHT: 168 LBS | SYSTOLIC BLOOD PRESSURE: 136 MMHG | HEART RATE: 65 BPM | DIASTOLIC BLOOD PRESSURE: 68 MMHG

## 2017-11-09 LAB
ALBUMIN SERPL ELPH-MCNC: 3.9 G/DL
ALP BLD-CCNC: 102 U/L
ALT SERPL-CCNC: 10 U/L
ANION GAP SERPL CALC-SCNC: 18 MMOL/L
AST SERPL-CCNC: 13 U/L
BILIRUB SERPL-MCNC: 0.2 MG/DL
BUN SERPL-MCNC: 14 MG/DL
CALCIUM SERPL-MCNC: 9.3 MG/DL
CEA SERPL-MCNC: 2.9 NG/ML
CHLORIDE SERPL-SCNC: 105 MMOL/L
CO2 SERPL-SCNC: 20 MMOL/L
CREAT SERPL-MCNC: 0.76 MG/DL
POTASSIUM SERPL-SCNC: 4.7 MMOL/L
PROT SERPL-MCNC: 6.8 G/DL
SODIUM SERPL-SCNC: 143 MMOL/L

## 2017-11-09 PROCEDURE — 96372 THER/PROPH/DIAG INJ SC/IM: CPT

## 2017-11-09 RX ORDER — OMALIZUMAB 202.5 MG/1.4ML
150 INJECTION, SOLUTION SUBCUTANEOUS
Qty: 45 | Refills: 0 | Status: COMPLETED | OUTPATIENT
Start: 2017-11-09

## 2017-11-09 RX ADMIN — OMALIZUMAB 0 MG: 202.5 INJECTION, SOLUTION SUBCUTANEOUS at 00:00

## 2017-11-12 ENCOUNTER — FORM ENCOUNTER (OUTPATIENT)
Age: 67
End: 2017-11-12

## 2017-11-13 ENCOUNTER — APPOINTMENT (OUTPATIENT)
Dept: CT IMAGING | Facility: IMAGING CENTER | Age: 67
End: 2017-11-13
Payer: MEDICARE

## 2017-11-13 ENCOUNTER — OUTPATIENT (OUTPATIENT)
Dept: OUTPATIENT SERVICES | Facility: HOSPITAL | Age: 67
LOS: 1 days | End: 2017-11-13
Payer: MEDICARE

## 2017-11-13 DIAGNOSIS — Z98.89 OTHER SPECIFIED POSTPROCEDURAL STATES: Chronic | ICD-10-CM

## 2017-11-13 DIAGNOSIS — R93.8 ABNORMAL FINDINGS ON DIAGNOSTIC IMAGING OF OTHER SPECIFIED BODY STRUCTURES: ICD-10-CM

## 2017-11-13 DIAGNOSIS — Z96.653 PRESENCE OF ARTIFICIAL KNEE JOINT, BILATERAL: Chronic | ICD-10-CM

## 2017-11-13 DIAGNOSIS — H05.352 EXOSTOSIS OF LEFT ORBIT: Chronic | ICD-10-CM

## 2017-11-13 PROCEDURE — 71250 CT THORAX DX C-: CPT | Mod: 26

## 2017-11-13 PROCEDURE — 71250 CT THORAX DX C-: CPT

## 2017-11-24 ENCOUNTER — APPOINTMENT (OUTPATIENT)
Dept: PULMONOLOGY | Facility: CLINIC | Age: 67
End: 2017-11-24

## 2017-11-27 PROBLEM — L82.0 INFLAMED SEBORRHEIC KERATOSIS: Status: RESOLVED | Noted: 2017-07-13 | Resolved: 2017-11-27

## 2017-11-27 PROBLEM — R76.8 LOW IMMUNOGLOBULIN LEVEL: Status: RESOLVED | Noted: 2017-02-20 | Resolved: 2017-11-27

## 2017-11-27 PROBLEM — J32.9 RECURRENT SINUSITIS: Status: RESOLVED | Noted: 2017-02-21 | Resolved: 2017-11-27

## 2017-11-27 PROBLEM — L70.8 ACNEIFORM ERUPTION: Status: RESOLVED | Noted: 2017-07-13 | Resolved: 2017-11-27

## 2017-11-27 PROBLEM — Z86.19 HISTORY OF RECURRENT INFECTION: Status: RESOLVED | Noted: 2017-02-21 | Resolved: 2017-11-27

## 2017-11-27 PROBLEM — Z87.898 HISTORY OF LUMP OF LEFT BREAST: Status: RESOLVED | Noted: 2017-08-07 | Resolved: 2017-11-27

## 2017-11-27 PROBLEM — Z87.01 HISTORY OF RECURRENT PNEUMONIA: Status: RESOLVED | Noted: 2017-02-21 | Resolved: 2017-11-27

## 2017-11-27 PROBLEM — M79.661 PAIN IN SHIN, RIGHT: Status: RESOLVED | Noted: 2017-06-22 | Resolved: 2017-11-27

## 2017-11-27 PROBLEM — L85.3 XEROSIS CUTIS: Status: RESOLVED | Noted: 2017-07-13 | Resolved: 2017-11-27

## 2017-11-27 PROBLEM — Z87.898 HISTORY OF LUMP OF RIGHT BREAST: Status: RESOLVED | Noted: 2017-08-07 | Resolved: 2017-11-27

## 2017-11-27 PROBLEM — Z88.1 HISTORY OF ALLERGY TO ANTIBIOTIC AGENT: Status: RESOLVED | Noted: 2017-04-03 | Resolved: 2017-11-27

## 2017-11-27 PROBLEM — K63.9 MASS OF COLON: Status: RESOLVED | Noted: 2017-03-28 | Resolved: 2017-11-27

## 2017-11-27 PROBLEM — T30.0 RADIATION BURN: Status: RESOLVED | Noted: 2017-06-22 | Resolved: 2017-11-27

## 2017-11-27 PROBLEM — Z13.29 SCREENING FOR ENDOCRINE/METABOLIC/IMMUNITY DISORDERS: Status: RESOLVED | Noted: 2017-02-21 | Resolved: 2017-11-27

## 2017-11-30 ENCOUNTER — APPOINTMENT (OUTPATIENT)
Dept: THORACIC SURGERY | Facility: CLINIC | Age: 67
End: 2017-11-30
Payer: MEDICARE

## 2017-11-30 VITALS
TEMPERATURE: 97.8 F | DIASTOLIC BLOOD PRESSURE: 78 MMHG | OXYGEN SATURATION: 95 % | SYSTOLIC BLOOD PRESSURE: 130 MMHG | RESPIRATION RATE: 16 BRPM | HEART RATE: 80 BPM

## 2017-11-30 DIAGNOSIS — Z13.29 ENCOUNTER FOR SCREENING FOR OTHER SUSPECTED ENDOCRINE DISORDER: ICD-10-CM

## 2017-11-30 DIAGNOSIS — R76.8 OTHER SPECIFIED ABNORMAL IMMUNOLOGICAL FINDINGS IN SERUM: ICD-10-CM

## 2017-11-30 DIAGNOSIS — M79.661 PAIN IN RIGHT LOWER LEG: ICD-10-CM

## 2017-11-30 DIAGNOSIS — Z86.19 PERSONAL HISTORY OF OTHER INFECTIOUS AND PARASITIC DISEASES: ICD-10-CM

## 2017-11-30 DIAGNOSIS — Z87.898 PERSONAL HISTORY OF OTHER SPECIFIED CONDITIONS: ICD-10-CM

## 2017-11-30 DIAGNOSIS — Z87.01 PERSONAL HISTORY OF PNEUMONIA (RECURRENT): ICD-10-CM

## 2017-11-30 DIAGNOSIS — T30.0 BURN OF UNSPECIFIED BODY REGION, UNSPECIFIED DEGREE: ICD-10-CM

## 2017-11-30 DIAGNOSIS — Z87.09 PERSONAL HISTORY OF OTHER DISEASES OF THE RESPIRATORY SYSTEM: ICD-10-CM

## 2017-11-30 DIAGNOSIS — Z13.0 ENCOUNTER FOR SCREENING FOR OTHER SUSPECTED ENDOCRINE DISORDER: ICD-10-CM

## 2017-11-30 DIAGNOSIS — L70.8 OTHER ACNE: ICD-10-CM

## 2017-11-30 DIAGNOSIS — Z98.890 OTHER SPECIFIED POSTPROCEDURAL STATES: ICD-10-CM

## 2017-11-30 DIAGNOSIS — Z88.1 ALLERGY STATUS TO OTHER ANTIBIOTIC AGENTS: ICD-10-CM

## 2017-11-30 DIAGNOSIS — J32.9 CHRONIC SINUSITIS, UNSPECIFIED: ICD-10-CM

## 2017-11-30 DIAGNOSIS — K63.9 DISEASE OF INTESTINE, UNSPECIFIED: ICD-10-CM

## 2017-11-30 DIAGNOSIS — L82.0 INFLAMED SEBORRHEIC KERATOSIS: ICD-10-CM

## 2017-11-30 DIAGNOSIS — L85.3 XEROSIS CUTIS: ICD-10-CM

## 2017-11-30 DIAGNOSIS — Z13.228 ENCOUNTER FOR SCREENING FOR OTHER SUSPECTED ENDOCRINE DISORDER: ICD-10-CM

## 2017-11-30 PROCEDURE — 99213 OFFICE O/P EST LOW 20 MIN: CPT

## 2017-12-04 ENCOUNTER — APPOINTMENT (OUTPATIENT)
Dept: PULMONOLOGY | Facility: CLINIC | Age: 67
End: 2017-12-04
Payer: MEDICARE

## 2017-12-04 VITALS
HEIGHT: 67 IN | WEIGHT: 168 LBS | HEART RATE: 71 BPM | OXYGEN SATURATION: 96 % | DIASTOLIC BLOOD PRESSURE: 78 MMHG | SYSTOLIC BLOOD PRESSURE: 118 MMHG | BODY MASS INDEX: 26.37 KG/M2

## 2017-12-04 PROCEDURE — 99214 OFFICE O/P EST MOD 30 MIN: CPT | Mod: 25

## 2017-12-04 PROCEDURE — 94010 BREATHING CAPACITY TEST: CPT

## 2017-12-05 ENCOUNTER — OUTPATIENT (OUTPATIENT)
Dept: OUTPATIENT SERVICES | Facility: HOSPITAL | Age: 67
LOS: 1 days | Discharge: HOME | End: 2017-12-05

## 2017-12-05 DIAGNOSIS — Z98.89 OTHER SPECIFIED POSTPROCEDURAL STATES: Chronic | ICD-10-CM

## 2017-12-05 DIAGNOSIS — H05.352 EXOSTOSIS OF LEFT ORBIT: Chronic | ICD-10-CM

## 2017-12-05 DIAGNOSIS — C18.9 MALIGNANT NEOPLASM OF COLON, UNSPECIFIED: ICD-10-CM

## 2017-12-05 DIAGNOSIS — Z96.653 PRESENCE OF ARTIFICIAL KNEE JOINT, BILATERAL: Chronic | ICD-10-CM

## 2017-12-07 ENCOUNTER — LABORATORY RESULT (OUTPATIENT)
Age: 67
End: 2017-12-07

## 2017-12-08 ENCOUNTER — RESULT REVIEW (OUTPATIENT)
Age: 67
End: 2017-12-08

## 2017-12-08 ENCOUNTER — LABORATORY RESULT (OUTPATIENT)
Age: 67
End: 2017-12-08

## 2017-12-08 ENCOUNTER — NON-APPOINTMENT (OUTPATIENT)
Age: 67
End: 2017-12-08

## 2017-12-08 ENCOUNTER — APPOINTMENT (OUTPATIENT)
Dept: INFUSION THERAPY | Facility: HOSPITAL | Age: 67
End: 2017-12-08

## 2017-12-08 ENCOUNTER — APPOINTMENT (OUTPATIENT)
Dept: CARDIOLOGY | Facility: CLINIC | Age: 67
End: 2017-12-08
Payer: MEDICARE

## 2017-12-08 VITALS
DIASTOLIC BLOOD PRESSURE: 80 MMHG | HEART RATE: 76 BPM | BODY MASS INDEX: 25.84 KG/M2 | SYSTOLIC BLOOD PRESSURE: 141 MMHG | WEIGHT: 165 LBS

## 2017-12-08 LAB
BASOPHILS # BLD AUTO: 0 K/UL — SIGNIFICANT CHANGE UP (ref 0–0.2)
BASOPHILS NFR BLD AUTO: 0.2 % — SIGNIFICANT CHANGE UP (ref 0–2)
EOSINOPHIL # BLD AUTO: 0.1 K/UL — SIGNIFICANT CHANGE UP (ref 0–0.5)
EOSINOPHIL NFR BLD AUTO: 1.1 % — SIGNIFICANT CHANGE UP (ref 0–6)
HCT VFR BLD CALC: 41.4 % — SIGNIFICANT CHANGE UP (ref 34.5–45)
HGB BLD-MCNC: 13.3 G/DL — SIGNIFICANT CHANGE UP (ref 11.5–15.5)
LYMPHOCYTES # BLD AUTO: 1 K/UL — SIGNIFICANT CHANGE UP (ref 1–3.3)
LYMPHOCYTES # BLD AUTO: 13.4 % — SIGNIFICANT CHANGE UP (ref 13–44)
MCHC RBC-ENTMCNC: 24.6 PG — LOW (ref 27–34)
MCHC RBC-ENTMCNC: 32.3 G/DL — SIGNIFICANT CHANGE UP (ref 32–36)
MCV RBC AUTO: 76.1 FL — LOW (ref 80–100)
MONOCYTES # BLD AUTO: 0.5 K/UL — SIGNIFICANT CHANGE UP (ref 0–0.9)
MONOCYTES NFR BLD AUTO: 7.3 % — SIGNIFICANT CHANGE UP (ref 2–14)
NEUTROPHILS # BLD AUTO: 5.8 K/UL — SIGNIFICANT CHANGE UP (ref 1.8–7.4)
NEUTROPHILS NFR BLD AUTO: 78 % — HIGH (ref 43–77)
PLATELET # BLD AUTO: 221 K/UL — SIGNIFICANT CHANGE UP (ref 150–400)
RBC # BLD: 5.44 M/UL — HIGH (ref 3.8–5.2)
RBC # FLD: 14 % — SIGNIFICANT CHANGE UP (ref 10.3–14.5)
WBC # BLD: 7.4 K/UL — SIGNIFICANT CHANGE UP (ref 3.8–10.5)
WBC # FLD AUTO: 7.4 K/UL — SIGNIFICANT CHANGE UP (ref 3.8–10.5)

## 2017-12-08 PROCEDURE — 99214 OFFICE O/P EST MOD 30 MIN: CPT

## 2017-12-08 PROCEDURE — 93000 ELECTROCARDIOGRAM COMPLETE: CPT

## 2017-12-08 RX ORDER — CIPROFLOXACIN HYDROCHLORIDE 500 MG/1
500 TABLET, FILM COATED ORAL
Qty: 20 | Refills: 0 | Status: DISCONTINUED | COMMUNITY
Start: 2017-11-06 | End: 2017-12-08

## 2017-12-08 RX ORDER — MOXIFLOXACIN HYDROCHLORIDE TABLETS, 400 MG 400 MG/1
400 TABLET, FILM COATED ORAL DAILY
Qty: 8 | Refills: 0 | Status: DISCONTINUED | COMMUNITY
Start: 2017-10-29 | End: 2017-12-08

## 2017-12-08 RX ORDER — OSELTAMIVIR PHOSPHATE 75 MG/1
75 CAPSULE ORAL TWICE DAILY
Qty: 10 | Refills: 0 | Status: DISCONTINUED | COMMUNITY
Start: 2017-10-29 | End: 2017-12-08

## 2017-12-08 RX ORDER — BENZOYL PEROXIDE 5 G/100G
5 LIQUID TOPICAL
Qty: 1 | Refills: 3 | Status: DISCONTINUED | COMMUNITY
Start: 2017-07-14 | End: 2017-12-08

## 2017-12-12 ENCOUNTER — APPOINTMENT (OUTPATIENT)
Dept: PULMONOLOGY | Facility: CLINIC | Age: 67
End: 2017-12-12
Payer: MEDICARE

## 2017-12-12 PROCEDURE — 96372 THER/PROPH/DIAG INJ SC/IM: CPT

## 2017-12-12 RX ORDER — OMALIZUMAB 202.5 MG/1.4ML
150 INJECTION, SOLUTION SUBCUTANEOUS
Qty: 1 | Refills: 0 | Status: COMPLETED | OUTPATIENT
Start: 2017-12-12

## 2017-12-12 RX ADMIN — OMALIZUMAB 1.5 MG: 202.5 INJECTION, SOLUTION SUBCUTANEOUS at 00:00

## 2017-12-13 ENCOUNTER — OTHER (OUTPATIENT)
Age: 67
End: 2017-12-13

## 2017-12-21 ENCOUNTER — INPATIENT (INPATIENT)
Facility: HOSPITAL | Age: 67
LOS: 7 days | Discharge: HOME | DRG: 74 | End: 2017-12-29
Attending: HOSPITALIST | Admitting: HOSPITALIST
Payer: MEDICARE

## 2017-12-21 ENCOUNTER — MOBILE ON CALL (OUTPATIENT)
Age: 67
End: 2017-12-21

## 2017-12-21 VITALS
OXYGEN SATURATION: 98 % | HEART RATE: 88 BPM | SYSTOLIC BLOOD PRESSURE: 136 MMHG | DIASTOLIC BLOOD PRESSURE: 71 MMHG | RESPIRATION RATE: 16 BRPM

## 2017-12-21 DIAGNOSIS — Z96.653 PRESENCE OF ARTIFICIAL KNEE JOINT, BILATERAL: Chronic | ICD-10-CM

## 2017-12-21 DIAGNOSIS — J44.1 CHRONIC OBSTRUCTIVE PULMONARY DISEASE WITH (ACUTE) EXACERBATION: ICD-10-CM

## 2017-12-21 DIAGNOSIS — Z98.89 OTHER SPECIFIED POSTPROCEDURAL STATES: Chronic | ICD-10-CM

## 2017-12-21 DIAGNOSIS — H05.352 EXOSTOSIS OF LEFT ORBIT: Chronic | ICD-10-CM

## 2017-12-21 LAB
ALBUMIN SERPL ELPH-MCNC: 4.2 G/DL — SIGNIFICANT CHANGE UP (ref 3.3–5)
ALP SERPL-CCNC: 109 U/L — SIGNIFICANT CHANGE UP (ref 40–120)
ALT FLD-CCNC: 14 U/L RC — SIGNIFICANT CHANGE UP (ref 10–45)
ANION GAP SERPL CALC-SCNC: 15 MMOL/L — SIGNIFICANT CHANGE UP (ref 5–17)
APTT BLD: 39.2 SEC — HIGH (ref 27.5–37.4)
AST SERPL-CCNC: 15 U/L — SIGNIFICANT CHANGE UP (ref 10–40)
BASOPHILS # BLD AUTO: 0 K/UL — SIGNIFICANT CHANGE UP (ref 0–0.2)
BASOPHILS NFR BLD AUTO: 0 % — SIGNIFICANT CHANGE UP (ref 0–2)
BILIRUB SERPL-MCNC: 0.1 MG/DL — LOW (ref 0.2–1.2)
BUN SERPL-MCNC: 16 MG/DL — SIGNIFICANT CHANGE UP (ref 7–23)
CALCIUM SERPL-MCNC: 9.5 MG/DL — SIGNIFICANT CHANGE UP (ref 8.4–10.5)
CHLORIDE SERPL-SCNC: 100 MMOL/L — SIGNIFICANT CHANGE UP (ref 96–108)
CK MB CFR SERPL CALC: 3.1 NG/ML — SIGNIFICANT CHANGE UP (ref 0–3.8)
CO2 SERPL-SCNC: 24 MMOL/L — SIGNIFICANT CHANGE UP (ref 22–31)
CREAT SERPL-MCNC: 0.88 MG/DL — SIGNIFICANT CHANGE UP (ref 0.5–1.3)
EOSINOPHIL # BLD AUTO: 0 K/UL — SIGNIFICANT CHANGE UP (ref 0–0.5)
EOSINOPHIL NFR BLD AUTO: 0.6 % — SIGNIFICANT CHANGE UP (ref 0–6)
GLUCOSE BLDC GLUCOMTR-MCNC: 184 MG/DL — HIGH (ref 70–99)
GLUCOSE SERPL-MCNC: 152 MG/DL — HIGH (ref 70–99)
HCT VFR BLD CALC: 43.7 % — SIGNIFICANT CHANGE UP (ref 34.5–45)
HGB BLD-MCNC: 14 G/DL — SIGNIFICANT CHANGE UP (ref 11.5–15.5)
INR BLD: 1.29 RATIO — HIGH (ref 0.88–1.16)
LYMPHOCYTES # BLD AUTO: 0.7 K/UL — LOW (ref 1–3.3)
LYMPHOCYTES # BLD AUTO: 9.1 % — LOW (ref 13–44)
MCHC RBC-ENTMCNC: 24.6 PG — LOW (ref 27–34)
MCHC RBC-ENTMCNC: 32 GM/DL — SIGNIFICANT CHANGE UP (ref 32–36)
MCV RBC AUTO: 76.9 FL — LOW (ref 80–100)
MONOCYTES # BLD AUTO: 0.6 K/UL — SIGNIFICANT CHANGE UP (ref 0–0.9)
MONOCYTES NFR BLD AUTO: 7.5 % — SIGNIFICANT CHANGE UP (ref 2–14)
NEUTROPHILS # BLD AUTO: 6.6 K/UL — SIGNIFICANT CHANGE UP (ref 1.8–7.4)
NEUTROPHILS NFR BLD AUTO: 82.9 % — HIGH (ref 43–77)
NT-PROBNP SERPL-SCNC: 15 PG/ML — SIGNIFICANT CHANGE UP (ref 0–300)
PLATELET # BLD AUTO: 301 K/UL — SIGNIFICANT CHANGE UP (ref 150–400)
POTASSIUM SERPL-MCNC: 4.7 MMOL/L — SIGNIFICANT CHANGE UP (ref 3.5–5.3)
POTASSIUM SERPL-SCNC: 4.7 MMOL/L — SIGNIFICANT CHANGE UP (ref 3.5–5.3)
PROT SERPL-MCNC: 7.7 G/DL — SIGNIFICANT CHANGE UP (ref 6–8.3)
PROTHROM AB SERPL-ACNC: 14 SEC — HIGH (ref 9.8–12.7)
RAPID RVP RESULT: SIGNIFICANT CHANGE UP
RBC # BLD: 5.68 M/UL — HIGH (ref 3.8–5.2)
RBC # FLD: 14 % — SIGNIFICANT CHANGE UP (ref 10.3–14.5)
SODIUM SERPL-SCNC: 139 MMOL/L — SIGNIFICANT CHANGE UP (ref 135–145)
TROPONIN T SERPL-MCNC: <0.01 NG/ML — SIGNIFICANT CHANGE UP (ref 0–0.06)
WBC # BLD: 7.9 K/UL — SIGNIFICANT CHANGE UP (ref 3.8–10.5)
WBC # FLD AUTO: 7.9 K/UL — SIGNIFICANT CHANGE UP (ref 3.8–10.5)

## 2017-12-21 PROCEDURE — 71020: CPT | Mod: 26

## 2017-12-21 PROCEDURE — 76604 US EXAM CHEST: CPT | Mod: 26

## 2017-12-21 PROCEDURE — 93010 ELECTROCARDIOGRAM REPORT: CPT

## 2017-12-21 PROCEDURE — 99223 1ST HOSP IP/OBS HIGH 75: CPT | Mod: GC

## 2017-12-21 PROCEDURE — 71250 CT THORAX DX C-: CPT | Mod: 26

## 2017-12-21 PROCEDURE — 93971 EXTREMITY STUDY: CPT | Mod: 26

## 2017-12-21 PROCEDURE — 99285 EMERGENCY DEPT VISIT HI MDM: CPT | Mod: 25,GC

## 2017-12-21 RX ORDER — IPRATROPIUM/ALBUTEROL SULFATE 18-103MCG
3 AEROSOL WITH ADAPTER (GRAM) INHALATION ONCE
Qty: 0 | Refills: 0 | Status: COMPLETED | OUTPATIENT
Start: 2017-12-21 | End: 2017-12-21

## 2017-12-21 RX ADMIN — Medication 3 MILLILITER(S): at 13:25

## 2017-12-21 RX ADMIN — Medication 125 MILLIGRAM(S): at 16:28

## 2017-12-21 RX ADMIN — Medication 3 MILLILITER(S): at 17:38

## 2017-12-21 NOTE — ED PROVIDER NOTE - OBJECTIVE STATEMENT
Pt is a 67F with a PMH of afib/aortic insufficiency, on Eliquis, trichomalacia, dvt, DM on insulin, asthma/COPD, s/p colorectal ca remission s/p chemo/radiation presenting with a cc of nonexertional SOB x2 days, a/w productive cough x2 weeks, seen by pulmonologist x2 weeks ago, given bactrim, SOB different than prior episodes, reports feels better with epigastric pressure, no chest pain, palpitations. Denies n/v/f/c/cp/. Denies headache, syncope, lightheadedness, dizziness. Denies chest palpitations, abdominal pain. Denies dysuria, hematuria, hematochezia, BRBPR, tarry stools, diarrhea, constipation.

## 2017-12-21 NOTE — ED PROVIDER NOTE - MEDICAL DECISION MAKING DETAILS
Pt is a 67F w/ multiple cardiac/pulmonary comorbidities, hx of colorectal cancer, on Eliquis, on insulin, presenting with a cc of nonexertional SOB x2 days, actively being treated for PNA, on exam vss diffuse fait wheezing crackles b/l bases, mucus plugging, ttp post L calf, c/f pna, chf exacerbation, effusion, consider PE, will get labs cardiacs cxr LLE US, likely admit

## 2017-12-21 NOTE — ED ADULT NURSE NOTE - CHPI ED SYMPTOMS NEG
no pain/no tingling/no dizziness/no fever/no decreased eating/drinking/no chills/no vomiting/no weakness/no nausea/no numbness

## 2017-12-21 NOTE — ED PROVIDER NOTE - ATTENDING CONTRIBUTION TO CARE
I have examined and evaluated this patient with the above resident or PA, and agree with the documented clinical history, exam and plan.   Briefly: 67F w/ ho AI, asthma/copd, IDDM, hx of colorectal cancer, on Eliquis, presenting with a cc of nonexertional SOB x2 days, actively being treated for PNA.  On exam appears chronically ill; lungs have diffuse wheezing and crackles b/l.  Also notes some left calf pain.  no peripheral edema.      US performed, no evidence of LLE DVT on compression us.  Labs, cxr, and ecg obtained; will need admission for further workup and management.

## 2017-12-21 NOTE — ED PROCEDURE NOTE - ATTENDING CONTRIBUTION TO CARE
I have participated in and supervised all key portions of the above procedures and agree with the above documentation. JONATHON Gamble MD

## 2017-12-21 NOTE — ED ADULT NURSE NOTE - OBJECTIVE STATEMENT
68y/o F presented to the ED from home with c/o SOB beginning 2 days ago.  Patient states that she was sitting watching TV when she began to fell SOB. Patient states she feels relief of SOB with epigastric pressure. Patient states that she began taking Bactrim 3 days ago for a lung infection. Hx. of trichomalacia, DVT, asthma, COPD, a-fib. Patient reports taking Eliquis for A_fib. Rhonchi bilaterally on auscultation, with protective cough, green sputum. Bowl sounds present x4. +1 peripheral pulses. Denies fever/chills/N/V/D, lightheadedness, headache, chest pain, abdominal pain. No respiratory distress noted. Safety precautions maintained.

## 2017-12-21 NOTE — PATIENT PROFILE ADULT. - NS TRANSFER PATIENT BELONGINGS
Cell Phone/PDA (specify)/4 gold bracelet, 1 silver bracelet, wedding band,  1 ring rosalina/ sapphire, cell phone/ ,/Jewelry/Money (specify)

## 2017-12-22 DIAGNOSIS — E11.9 TYPE 2 DIABETES MELLITUS WITHOUT COMPLICATIONS: ICD-10-CM

## 2017-12-22 DIAGNOSIS — I10 ESSENTIAL (PRIMARY) HYPERTENSION: ICD-10-CM

## 2017-12-22 DIAGNOSIS — I35.1 NONRHEUMATIC AORTIC (VALVE) INSUFFICIENCY: ICD-10-CM

## 2017-12-22 DIAGNOSIS — E27.40 UNSPECIFIED ADRENOCORTICAL INSUFFICIENCY: ICD-10-CM

## 2017-12-22 DIAGNOSIS — J44.1 CHRONIC OBSTRUCTIVE PULMONARY DISEASE WITH (ACUTE) EXACERBATION: ICD-10-CM

## 2017-12-22 DIAGNOSIS — Z29.9 ENCOUNTER FOR PROPHYLACTIC MEASURES, UNSPECIFIED: ICD-10-CM

## 2017-12-22 DIAGNOSIS — I48.0 PAROXYSMAL ATRIAL FIBRILLATION: ICD-10-CM

## 2017-12-22 DIAGNOSIS — R10.13 EPIGASTRIC PAIN: ICD-10-CM

## 2017-12-22 LAB
ALBUMIN SERPL ELPH-MCNC: 3.8 G/DL — SIGNIFICANT CHANGE UP (ref 3.3–5)
ALP SERPL-CCNC: 106 U/L — SIGNIFICANT CHANGE UP (ref 40–120)
ALT FLD-CCNC: 12 U/L RC — SIGNIFICANT CHANGE UP (ref 10–45)
AMYLASE P1 CFR SERPL: 53 U/L — SIGNIFICANT CHANGE UP (ref 25–125)
ANION GAP SERPL CALC-SCNC: 12 MMOL/L — SIGNIFICANT CHANGE UP (ref 5–17)
APPEARANCE UR: CLEAR — SIGNIFICANT CHANGE UP
AST SERPL-CCNC: 10 U/L — SIGNIFICANT CHANGE UP (ref 10–40)
BILIRUB SERPL-MCNC: 0.2 MG/DL — SIGNIFICANT CHANGE UP (ref 0.2–1.2)
BILIRUB UR-MCNC: NEGATIVE — SIGNIFICANT CHANGE UP
BUN SERPL-MCNC: 18 MG/DL — SIGNIFICANT CHANGE UP (ref 7–23)
CALCIUM SERPL-MCNC: 9.4 MG/DL — SIGNIFICANT CHANGE UP (ref 8.4–10.5)
CANCER AG19-9 SERPL-ACNC: 2.2 U/ML — SIGNIFICANT CHANGE UP
CEA SERPL-MCNC: 3.6 NG/ML — SIGNIFICANT CHANGE UP (ref 0–3.8)
CHLORIDE SERPL-SCNC: 98 MMOL/L — SIGNIFICANT CHANGE UP (ref 96–108)
CK MB BLD-MCNC: 1.9 % — SIGNIFICANT CHANGE UP (ref 0–3.5)
CK MB CFR SERPL CALC: 2.7 NG/ML — SIGNIFICANT CHANGE UP (ref 0–3.8)
CK SERPL-CCNC: 141 U/L — SIGNIFICANT CHANGE UP (ref 25–170)
CO2 SERPL-SCNC: 26 MMOL/L — SIGNIFICANT CHANGE UP (ref 22–31)
COLOR SPEC: SIGNIFICANT CHANGE UP
CREAT SERPL-MCNC: 0.92 MG/DL — SIGNIFICANT CHANGE UP (ref 0.5–1.3)
D DIMER BLD IA.RAPID-MCNC: <150 NG/ML DDU — SIGNIFICANT CHANGE UP
DIFF PNL FLD: NEGATIVE — SIGNIFICANT CHANGE UP
GLUCOSE BLDC GLUCOMTR-MCNC: 192 MG/DL — HIGH (ref 70–99)
GLUCOSE BLDC GLUCOMTR-MCNC: 225 MG/DL — HIGH (ref 70–99)
GLUCOSE BLDC GLUCOMTR-MCNC: 260 MG/DL — HIGH (ref 70–99)
GLUCOSE BLDC GLUCOMTR-MCNC: 283 MG/DL — HIGH (ref 70–99)
GLUCOSE BLDC GLUCOMTR-MCNC: 300 MG/DL — HIGH (ref 70–99)
GLUCOSE SERPL-MCNC: 313 MG/DL — HIGH (ref 70–99)
GLUCOSE UR QL: >1000
H PYLORI AB SER-ACNC: <5 UNITS — SIGNIFICANT CHANGE UP
H PYLORI IGA SER-ACNC: >150 UNITS — HIGH
KETONES UR-MCNC: ABNORMAL
LEUKOCYTE ESTERASE UR-ACNC: NEGATIVE — SIGNIFICANT CHANGE UP
LIDOCAIN IGE QN: 29 U/L — SIGNIFICANT CHANGE UP (ref 7–60)
NITRITE UR-MCNC: NEGATIVE — SIGNIFICANT CHANGE UP
OB PNL STL IA: NEGATIVE — SIGNIFICANT CHANGE UP
PH UR: 6 — SIGNIFICANT CHANGE UP (ref 5–8)
POTASSIUM SERPL-MCNC: 5.1 MMOL/L — SIGNIFICANT CHANGE UP (ref 3.5–5.3)
POTASSIUM SERPL-SCNC: 5.1 MMOL/L — SIGNIFICANT CHANGE UP (ref 3.5–5.3)
PROT SERPL-MCNC: 7.2 G/DL — SIGNIFICANT CHANGE UP (ref 6–8.3)
PROT UR-MCNC: NEGATIVE — SIGNIFICANT CHANGE UP
SODIUM SERPL-SCNC: 136 MMOL/L — SIGNIFICANT CHANGE UP (ref 135–145)
SP GR SPEC: 1.03 — HIGH (ref 1.01–1.02)
TROPONIN T SERPL-MCNC: <0.01 NG/ML — SIGNIFICANT CHANGE UP (ref 0–0.06)
UROBILINOGEN FLD QL: NEGATIVE — SIGNIFICANT CHANGE UP

## 2017-12-22 PROCEDURE — 99233 SBSQ HOSP IP/OBS HIGH 50: CPT | Mod: GC

## 2017-12-22 PROCEDURE — 99223 1ST HOSP IP/OBS HIGH 75: CPT

## 2017-12-22 PROCEDURE — 93010 ELECTROCARDIOGRAM REPORT: CPT

## 2017-12-22 PROCEDURE — 93970 EXTREMITY STUDY: CPT | Mod: 26

## 2017-12-22 RX ORDER — INSULIN LISPRO 100/ML
VIAL (ML) SUBCUTANEOUS
Qty: 0 | Refills: 0 | Status: DISCONTINUED | OUTPATIENT
Start: 2017-12-22 | End: 2017-12-26

## 2017-12-22 RX ORDER — ONDANSETRON 8 MG/1
4 TABLET, FILM COATED ORAL ONCE
Qty: 0 | Refills: 0 | Status: DISCONTINUED | OUTPATIENT
Start: 2017-12-22 | End: 2017-12-29

## 2017-12-22 RX ORDER — GLUCAGON INJECTION, SOLUTION 0.5 MG/.1ML
1 INJECTION, SOLUTION SUBCUTANEOUS ONCE
Qty: 0 | Refills: 0 | Status: DISCONTINUED | OUTPATIENT
Start: 2017-12-22 | End: 2017-12-29

## 2017-12-22 RX ORDER — APIXABAN 2.5 MG/1
5 TABLET, FILM COATED ORAL EVERY 12 HOURS
Qty: 0 | Refills: 0 | Status: DISCONTINUED | OUTPATIENT
Start: 2017-12-22 | End: 2017-12-25

## 2017-12-22 RX ORDER — MONTELUKAST 4 MG/1
10 TABLET, CHEWABLE ORAL DAILY
Qty: 0 | Refills: 0 | Status: DISCONTINUED | OUTPATIENT
Start: 2017-12-22 | End: 2017-12-29

## 2017-12-22 RX ORDER — OLANZAPINE 15 MG/1
2.5 TABLET, FILM COATED ORAL ONCE
Qty: 0 | Refills: 0 | Status: COMPLETED | OUTPATIENT
Start: 2017-12-22 | End: 2017-12-22

## 2017-12-22 RX ORDER — INSULIN LISPRO 100/ML
4 VIAL (ML) SUBCUTANEOUS
Qty: 0 | Refills: 0 | Status: DISCONTINUED | OUTPATIENT
Start: 2017-12-22 | End: 2017-12-22

## 2017-12-22 RX ORDER — ENOXAPARIN SODIUM 100 MG/ML
40 INJECTION SUBCUTANEOUS DAILY
Qty: 0 | Refills: 0 | Status: DISCONTINUED | OUTPATIENT
Start: 2017-12-22 | End: 2017-12-22

## 2017-12-22 RX ORDER — PANTOPRAZOLE SODIUM 20 MG/1
40 TABLET, DELAYED RELEASE ORAL
Qty: 0 | Refills: 0 | Status: DISCONTINUED | OUTPATIENT
Start: 2017-12-22 | End: 2017-12-29

## 2017-12-22 RX ORDER — INSULIN GLARGINE 100 [IU]/ML
12 INJECTION, SOLUTION SUBCUTANEOUS AT BEDTIME
Qty: 0 | Refills: 0 | Status: DISCONTINUED | OUTPATIENT
Start: 2017-12-22 | End: 2017-12-23

## 2017-12-22 RX ORDER — DEXTROSE 50 % IN WATER 50 %
1 SYRINGE (ML) INTRAVENOUS ONCE
Qty: 0 | Refills: 0 | Status: DISCONTINUED | OUTPATIENT
Start: 2017-12-22 | End: 2017-12-29

## 2017-12-22 RX ORDER — DEXTROSE 50 % IN WATER 50 %
12.5 SYRINGE (ML) INTRAVENOUS ONCE
Qty: 0 | Refills: 0 | Status: DISCONTINUED | OUTPATIENT
Start: 2017-12-22 | End: 2017-12-29

## 2017-12-22 RX ORDER — VALSARTAN 80 MG/1
40 TABLET ORAL DAILY
Qty: 0 | Refills: 0 | Status: DISCONTINUED | OUTPATIENT
Start: 2017-12-22 | End: 2017-12-29

## 2017-12-22 RX ORDER — DEXTROSE 50 % IN WATER 50 %
25 SYRINGE (ML) INTRAVENOUS ONCE
Qty: 0 | Refills: 0 | Status: DISCONTINUED | OUTPATIENT
Start: 2017-12-22 | End: 2017-12-29

## 2017-12-22 RX ORDER — CALCIUM CARBONATE 500(1250)
1 TABLET ORAL
Qty: 0 | Refills: 0 | COMMUNITY

## 2017-12-22 RX ORDER — INSULIN LISPRO 100/ML
6 VIAL (ML) SUBCUTANEOUS
Qty: 0 | Refills: 0 | Status: DISCONTINUED | OUTPATIENT
Start: 2017-12-22 | End: 2017-12-23

## 2017-12-22 RX ORDER — METOCLOPRAMIDE HCL 10 MG
5 TABLET ORAL EVERY 6 HOURS
Qty: 0 | Refills: 0 | Status: DISCONTINUED | OUTPATIENT
Start: 2017-12-22 | End: 2017-12-24

## 2017-12-22 RX ORDER — IPRATROPIUM/ALBUTEROL SULFATE 18-103MCG
3 AEROSOL WITH ADAPTER (GRAM) INHALATION EVERY 6 HOURS
Qty: 0 | Refills: 0 | Status: DISCONTINUED | OUTPATIENT
Start: 2017-12-22 | End: 2017-12-28

## 2017-12-22 RX ORDER — SODIUM CHLORIDE 9 MG/ML
1000 INJECTION, SOLUTION INTRAVENOUS
Qty: 0 | Refills: 0 | Status: DISCONTINUED | OUTPATIENT
Start: 2017-12-22 | End: 2017-12-29

## 2017-12-22 RX ORDER — CHOLECALCIFEROL (VITAMIN D3) 125 MCG
5000 CAPSULE ORAL DAILY
Qty: 0 | Refills: 0 | Status: DISCONTINUED | OUTPATIENT
Start: 2017-12-22 | End: 2017-12-29

## 2017-12-22 RX ORDER — DIGOXIN 250 MCG
0.25 TABLET ORAL DAILY
Qty: 0 | Refills: 0 | Status: DISCONTINUED | OUTPATIENT
Start: 2017-12-22 | End: 2017-12-29

## 2017-12-22 RX ORDER — FAMOTIDINE 10 MG/ML
20 INJECTION INTRAVENOUS DAILY
Qty: 0 | Refills: 0 | Status: DISCONTINUED | OUTPATIENT
Start: 2017-12-22 | End: 2017-12-29

## 2017-12-22 RX ORDER — ASCORBIC ACID 60 MG
500 TABLET,CHEWABLE ORAL DAILY
Qty: 0 | Refills: 0 | Status: DISCONTINUED | OUTPATIENT
Start: 2017-12-22 | End: 2017-12-29

## 2017-12-22 RX ADMIN — MONTELUKAST 10 MILLIGRAM(S): 4 TABLET, CHEWABLE ORAL at 13:32

## 2017-12-22 RX ADMIN — Medication 6 UNIT(S): at 18:17

## 2017-12-22 RX ADMIN — FAMOTIDINE 20 MILLIGRAM(S): 10 INJECTION INTRAVENOUS at 17:34

## 2017-12-22 RX ADMIN — Medication 0.25 MILLIGRAM(S): at 06:21

## 2017-12-22 RX ADMIN — Medication 6 UNIT(S): at 13:31

## 2017-12-22 RX ADMIN — Medication 3 MILLILITER(S): at 12:06

## 2017-12-22 RX ADMIN — APIXABAN 5 MILLIGRAM(S): 2.5 TABLET, FILM COATED ORAL at 17:29

## 2017-12-22 RX ADMIN — Medication 150 MILLIGRAM(S): at 17:30

## 2017-12-22 RX ADMIN — PANTOPRAZOLE SODIUM 40 MILLIGRAM(S): 20 TABLET, DELAYED RELEASE ORAL at 06:20

## 2017-12-22 RX ADMIN — Medication 150 MILLIGRAM(S): at 06:50

## 2017-12-22 RX ADMIN — OLANZAPINE 2.5 MILLIGRAM(S): 15 TABLET, FILM COATED ORAL at 01:42

## 2017-12-22 RX ADMIN — Medication 3 MILLILITER(S): at 06:26

## 2017-12-22 RX ADMIN — Medication 30 MILLILITER(S): at 14:41

## 2017-12-22 RX ADMIN — Medication 3 MILLILITER(S): at 23:24

## 2017-12-22 RX ADMIN — Medication 3 MILLILITER(S): at 17:38

## 2017-12-22 RX ADMIN — OLANZAPINE 2.5 MILLIGRAM(S): 15 TABLET, FILM COATED ORAL at 23:51

## 2017-12-22 RX ADMIN — Medication 4: at 09:12

## 2017-12-22 RX ADMIN — Medication 1 TABLET(S): at 17:34

## 2017-12-22 RX ADMIN — Medication 500 MILLIGRAM(S): at 13:32

## 2017-12-22 RX ADMIN — APIXABAN 5 MILLIGRAM(S): 2.5 TABLET, FILM COATED ORAL at 06:27

## 2017-12-22 RX ADMIN — Medication 6: at 13:31

## 2017-12-22 RX ADMIN — Medication 5000 UNIT(S): at 13:32

## 2017-12-22 RX ADMIN — Medication 2: at 18:22

## 2017-12-22 RX ADMIN — Medication 5 MILLIGRAM(S): at 14:42

## 2017-12-22 RX ADMIN — Medication 40 MILLIGRAM(S): at 06:22

## 2017-12-22 RX ADMIN — VALSARTAN 40 MILLIGRAM(S): 80 TABLET ORAL at 06:19

## 2017-12-22 RX ADMIN — INSULIN GLARGINE 12 UNIT(S): 100 INJECTION, SOLUTION SUBCUTANEOUS at 22:26

## 2017-12-22 NOTE — H&P ADULT - PROBLEM SELECTOR PLAN 2
- given patient's CT findings of a pancreatic lesion, will order MRI for further characterization, along with CA 19-9 and CEA, amylase, lipase.  - H/h stable, and takes pantoprazole at home. Will send H. Pylori studies and start maalox prn dyspepsia. May require GI consultation if unable to find etiology, as patient is chronically on steroids and could have an element of gastritis, and in the setting of darker stool could be indicative of bleed. - given patient's CT findings of a pancreatic lesion, may need to order MRI for further characterization. Will order CA 19-9 and CEA, amylase, lipase.  - H/h stable, and takes pantoprazole at home. Will send H. Pylori studies and start maalox prn dyspepsia. May require GI consultation if unable to find etiology, as patient is chronically on steroids and could have an element of gastritis, and in the setting of darker stool could be indicative of bleed.  - Diabetic gastroparesis also a possibility. - given patient's CT findings of a pancreatic lesion, may need to order MRI for further characterization. Will order CA 19-9 and CEA, amylase, lipase.  - H/h stable, and takes pantoprazole at home. Will send H. Pylori studies and start maalox prn dyspepsia. May require GI consultation if unable to find etiology, as patient is chronically on steroids and could have an element of gastritis, and in the setting of darker stool could be indicative of bleed.  - Diabetic gastroparesis also a possibility.  - Also possible angiinal equivalent. C/s cards in  AM -> may need stress test

## 2017-12-22 NOTE — PROGRESS NOTE ADULT - ASSESSMENT
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

## 2017-12-22 NOTE — H&P ADULT - NSHPREVIEWOFSYSTEMS_GEN_ALL_CORE
REVIEW OF SYSTEMS    CONSTITUTIONAL:  Denies f nvd chills  HEENT:  Eyes:  Denies visual loss, blurred vision, double vision or scleral icterus. Ears, Nose, Throat:  Denies hearing loss, sneezing, congestion, runny nose or sore throat.  SKIN:  Denies rash or itching.  CARDIOVASCULAR:  Denies chest pain, MARY  RESPIRATORY:  See HPI  GASTROINTESTINAL:  Denies anorexia, nausea, vomiting or diarrhea. No abdominal pain or blood.  GENITOURINARY:  Denies any hematuria, dysuria  NEUROLOGICAL:  Denies headache, dizziness, syncope, paralysis, ataxia, numbness or tingling in the extremities. No change in bowel or bladder control.  MUSCULOSKELETAL:  Denies muscle, back pain, joint pain or stiffness.  HEMATOLOGIC:  Denies anemia, bleeding or bruising.  LYMPHATICS:  Denies enlarged nodes.   PSYCHIATRIC:  Denies history of depression or anxiety.  ENDOCRINOLOGIC:  Denies reports of sweating, cold or heat intolerance. No polyuria or polydipsia.  ALLERGIES:  Denies history of hives, eczema or rhinitis. +postnasal drip

## 2017-12-22 NOTE — H&P ADULT - PROBLEM SELECTOR PLAN 1
- Unclear if this is actually a COPD exacerbation. Patient is not truly hypoxic, and CT Chest does not show acute disease  - Continue Duonebs, Prednisone 40 for 5 days  - RVP negative.  - No indication for antibiotics at this time.  - Other etiologies can include a PE. Patient allergic to iodine. ED DVT study negative Official DVT pending. VQ scan ordered. - Unclear if this is actually a COPD exacerbation. Patient is not truly hypoxic, and CT Chest does not show acute disease  - Continue Duonebs, Prednisone 40 for 5 days  - RVP negative.  - No indication for antibiotics at this time.  - Other etiologies can include a PE. Patient allergic to iodine. ED DVT study negative Official DVT pending. VQ scan ordered since patient has hx of anaphylaxis to contrast.

## 2017-12-22 NOTE — PROGRESS NOTE ADULT - ATTENDING COMMENTS
Labs, CT reviewed as above. Pt in no acute respiratory distress, her epigastric pain more likely 2/2 diabetic gastroparesis. Will give trial of reglan, simethicone and pepcid for symptomatic relief. Appreciate pulm recs - continue bactrim, fu Dr. Allison as outpatient. Continues to monitor and if pt remains stable, likely dc tmrbob Whitaker MD  Division of Hospital Medicine  Pager: [phone]  Office: [phone]

## 2017-12-22 NOTE — PROGRESS NOTE ADULT - PROBLEM SELECTOR PLAN 2
-given patient's CT findings of a pancreatic lesion, may need to order MRI for further characterization.   -Diabetic gastroparesis also a possibility.  -f/u CA 19-9 and CEA, amylase, lipase.  -c/w pantoprazole and maalox  -f/u H. Pylori studies -given patient's CT findings of a pancreatic lesion that was seen in a prior CT in 2016, mat need MRI OP   -Diabetic gastroparesis also a possibility.  -f/u CA 19-9 and CEA, amylase, lipase.  -c/w pantoprazole and maalox  -f/u H. Pylori studies

## 2017-12-22 NOTE — H&P ADULT - NSHPPHYSICALEXAM_GEN_ALL_CORE
Vital Signs Last 24 Hrs  T(C): 36.6 (21 Dec 2017 23:23), Max: 37.1 (21 Dec 2017 13:18)  T(F): 97.9 (21 Dec 2017 23:23), Max: 98.7 (21 Dec 2017 13:18)  HR: 80 (21 Dec 2017 23:23) (68 - 88)  BP: 162/83 (21 Dec 2017 23:23) (125/62 - 176/66)  BP(mean): --  RR: 16 (21 Dec 2017 23:23) (16 - 18)  SpO2: 97% (21 Dec 2017 23:23) (96% - 100%)    PHYSICAL EXAM:    GENERAL: Comfortable, no acute distress   HEAD:  Normocephalic, atraumatic  EYES: EOMI, PERRLA  HEENT: Moist mucous membranes  NECK: Supple, No JVD  NERVOUS SYSTEM:  CN 2-12 intact b/l. Alert & Oriented X3, Motor Strength 5/5 B/L upper and lower extremities. Sensation intact B/L  CHEST/LUNG: Expiratory wheezes b/l, cough.  HEART: Diastolic murmur c/w AI, RRR  ABDOMEN: Soft, Nontender, Nondistended, Bowel sounds present  EXTREMITIES:   No clubbing, cyanosis, or edema  MUSCULOSKELETAL: No muscle tenderness, no joint tenderness  SKIN: warm and dry, no rash

## 2017-12-22 NOTE — CONSULT NOTE ADULT - ASSESSMENT
67 yr F with PMH of COPD/asthma, tracheobronchomalacia s/p tracheo-bronchoplasty in 10/16, Afib on Eliquis , Adrenal Insufficieny on steroids,  DM2, HTN, Squamous cell CA of the anus on chemo/XRT, now admitted with transient episode of shortness of breath that occurred after eating    1. Shortness of breath:  - Currently she is asymptomatic and does not report any shortness of breath.  - It is possible that she may have diabetic gastroparesis as the symptoms occur after she eats  - Keep O2 sat > 92 %. Would check O2 sat on RA at rest and on ambulation  - Cont Duonebs Q6h and Singulair daily  - Would suggest adding nebulized hypertonic saline BID (preceded by Duonebs ) to assist with mucus clearance  - Ct chest reviewed, no PNA, the mild tree in bud is likely 2/2 mucus plugging. Given the increased cough and change in color of sputum she may have some tracheobronchitis. Previous BAL cxs have grown multiple organisms including Acinetobacter, Serratia, Alcaligenes and Pasturella. All have been somewhat sensitive to Bactrim. Since she reports an improvement in her cough/ sputum with Bactrim, it would not be unreasonable to complete a 7 day course.     - Can restart Spiriva and Symbicort BID (takes Breo ellipta at home which is non formulary)  - Chest PT/ incentive spirometry/ Acapella use  - Does not appear to have an asthma exacerbation. Would change steroids back to her home dose of medrol (she is steroid dependent)    2. Epigastric discomfort:  - Now resolved. ? Diabetic gastroparesis  - 1st set of cardiac enzymes was negative, no acute EKG changes  - Can followup repeat cardiac enzymes and may need outpt work up including possible stress test  - Trail of Reglan for gastroparesis    3. DM type 2:  - Follow FS, cont insulin S/S and Humalog coverage     4. DVT Px:  - On Eliquis 67 yr F with PMH of COPD/asthma, tracheobronchomalacia s/p tracheo-bronchoplasty in 10/16, Afib on Eliquis , Adrenal Insufficieny on steroids,  DM2, HTN, Squamous cell CA of the anus on chemo/XRT, now admitted with transient episode of shortness of breath that occurred after eating    1. Shortness of breath:  - Currently she is asymptomatic and does not report any shortness of breath.  - It is possible that she may have diabetic gastroparesis as the symptoms occur after she eats  - Keep O2 sat > 92 %. Would check O2 sat on RA at rest and on ambulation  - Cont Duonebs Q6h and Singulair daily  - Would suggest adding nebulized hypertonic saline BID (preceded by Duonebs ) to assist with mucus clearance  - Ct chest reviewed, no PNA, the mild tree in bud is likely 2/2 mucus plugging. Given the increased cough and change in color of sputum she may have some tracheobronchitis. Previous BAL cxs have grown multiple organisms including Acinetobacter, Serratia, Alcaligenes and Pasturella. All have been somewhat sensitive to Bactrim. Since she reports an improvement in her cough/ sputum with Bactrim, it would not be unreasonable to complete a 7 day course.     - Can restart Spiriva and Symbicort BID (takes Breo ellipta at home which is non formulary)  - Chest PT/ incentive spirometry/ Acapella use  - Does not appear to have an asthma exacerbation. Would change steroids back to her home dose of medrol (she is steroid dependent)    2. Epigastric discomfort:  - Now resolved. ? Diabetic gastroparesis  - 1st set of cardiac enzymes was negative, no acute EKG changes  - Can followup repeat cardiac enzymes and may need outpt work up including possible stress test  - Trail of Reglan for gastroparesis    3. DM type 2:  - Follow FS, cont insulin S/S, Lantus and Humalog coverage     4. DVT Px:  - On Eliquis

## 2017-12-22 NOTE — PROGRESS NOTE ADULT - PROBLEM SELECTOR PLAN 6
-Not controlled  -Adding Humalog 4 U  -C/W Lantus 12 QHS  -ISS  CC DASH diet -Not controlled  -Adding Humalog 4 U  -C/W Lantus 12 QHS  -Will continue to monitor  -FS TID and at bedtime  -ISS  CC DASH diet

## 2017-12-22 NOTE — PROGRESS NOTE ADULT - PROBLEM SELECTOR PLAN 1
- Unclear if this is actually a COPD exacerbation. Patient is not truly hypoxic, and CT Chest does not show acute disease  - Continue Duonebs, Prednisone 40 2/5 days  - RVP negative.  - No indication for antibiotics at this time.  - Other etiologies can include a PE. Patient allergic to iodine. ED DVT study negative Official DVT pending. VQ scan ordered since patient has hx of anaphylaxis to contrast. - Unclear if this is actually a COPD exacerbation. Patient is not truly hypoxic, and CT Chest does not show acute disease  - Continue Duonebs, Prednisone 40 2/5 days  - RVP negative.  - No indication for antibiotics at this time  -D-Dimer low - Unclear if this is actually a COPD exacerbation. Patient is not truly hypoxic, and CT Chest does not show acute disease  - Continue Duonebs, Prednisone 40 2/5 days  - RVP negative.  - Patient completed a sputum culture with her OP pulmonologist and it was positive for group B strep now requiring Bactrim for 10 days. Patient on day 4.  -D-Dimer low

## 2017-12-22 NOTE — H&P ADULT - ASSESSMENT
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

## 2017-12-22 NOTE — CONSULT NOTE ADULT - SUBJECTIVE AND OBJECTIVE BOX
PULMONARY CONSULT ON BEHALF OF DR. ALLISON    CHIEF COMPLAINT: Shortness of breath     HPI: 67 yr F with PMH of COPD/asthma, tracheobronchomalacia s/p tracheo-bronchoplasty in 10/16, Afib on Eliquis , Adrenal Insufficieny on steroids,  DM2, HTN, Squamous cell CA of the anus on chemo/XRT, now presenting with shortness of breath. She reports that she began to experience shortness of breath after she drank Glucerna yesterday. She describes that she was unable to catch her breath and this sensation was relieved by pushing on her epigastric area. A similar episode occurred the day before after she ate some bread. She has had a cough for 2 weeks which was initially productive of green sputum. She was started on Bactrim by Dr. Allison as an outpt and reports that the cough has been improving since she started the Bactrim 4-5 days ago. The shortness of breath is now completely resolved and has not recurred today. She denies fever, chills, chest pain, runny nose, sore throat, N/V/D, wheezing, LE edema, orthopnea or PND.     PAST MEDICAL & SURGICAL HISTORY:  Aortic disease: leaky valve  Colorectal cancer: 2017- last treatment , chemo and radiation  Tracheobronchomalacia  Asthma  Pelvic fracture  Aortic insufficiency: mod/severe AI on echo 16  Adrenal insufficiency  COPD (chronic obstructive pulmonary disease)  Diabetes: type two. insulin dependent  Atrial fibrillation  H/O pelvic surgery  Exostosis of orbit, left: left eye prosthetic  History of sinus surgery  H/O total knee replacement, bilateral  History of partial hysterectomy      FAMILY HISTORY:  Family history of diabetes mellitus type II  Family history of breast cancer (Sibling)  Family history of asthma      SOCIAL HISTORY:  Smoking: [ x] Never Smoked [ ] Former Smoker (__ packs x ___ years) [ ] Current Smoker  (__ packs x ___ years)  Substance Use: [ x] Never Used [ ] Used ____  EtOH Use: None  Marital Status: [ x] Single [ ]  [ ]  [ ]   Sexual History: NC  Occupation: Retired    Recent Travel: None  Advance Directives: Full code     Allergies    ampicillin (Short breath)  aspirin (Short breath)  Avelox (Short breath)  codeine (Short breath)  Dilaudid (Short breath)  iodine (Short breath)  penicillin (Short breath)  shellfish (Anaphylaxis)  tetanus toxoid (Short breath)  Valium (Short breath)    Intolerances        HOME MEDICATIONS:  · 	pregabalin 150 mg oral capsule: 1 cap(s) orally 2 times a day, Last Dose Taken:    · 	albuterol CFC free 90 mcg/inh inhalation aerosol: 1 puff(s) inhaled every 4 hours, As Needed - for shortness of breath and/or wheezing, Last Dose Taken:    · 	digoxin 250 mcg (0.25 mg) oral tablet: 1 tab(s) orally once a day, Last Dose Taken:    · 	pantoprazole 40 mg oral delayed release tablet: 1 tab(s) orally once a day (before a meal), Last Dose Taken:    · 	Lantus Solostar Pen 100 units/mL subcutaneous solution: 10 to 22 unit(s) subcutaneous once a day (at bedtime), Last Dose Taken:    · 	Spiriva 18 mcg inhalation capsule: 1 cap(s) inhaled once a day, Last Dose Taken:    · 	montelukast 10 mg oral tablet: 1 tab(s) orally once a day, Last Dose Taken:    · 	Breo Ellipta 200 mcg-25 mcg/inh inhalation powder: 1 puff(s) inhaled once a day, Last Dose Taken:    · 	Eliquis 5 mg oral tablet: 1 tab(s) orally 2 times a day, Last Dose Taken:    · 	Xyzal 5 mg oral tablet: 1 tab(s) orally once a day (in the evening), Last Dose Taken:    · 	Xolair 150 mg subcutaneous injection: subcutaneous every 2 weeks, Last Dose Taken:    · 	methylPREDNISolone 4 mg oral tablet: 1 tab(s) orally once a day, Last Dose Taken:    · 	Diovan 40 mg oral tablet: 1 tab(s) orally once a day, Last Dose Taken:    · 	Bactrim  mg-160 mg oral tablet: 1 tab(s) orally 2 times a day (for 10 days), Last Dose Taken:    · 	Vitamin D3 5000 intl units oral tablet: 1 tab(s) orally once a day, Last Dose Taken:    · 	Vitamin C 500 mg oral tablet: 1 tab(s) orally once a day, Last Dose Taken:    · 	Victoza 18 mg/3 mL subcutaneous solution: 1.8 milligram(s) subcutaneous once a day (in the morning), Last Dose Taken:    · 	NovoLOG 100 units/mL subcutaneous solution: about 7 unit(s) subcutaneous 3 times a day, Last Dose Taken:    · 	albuterol inhalation solution: inhaled 2 times a day, Last Dose Taken:    · 	mucomyst inhalation solution: inhaled 2 times a day, Last Dose Taken:      REVIEW OF SYSTEMS:  Constitutional: [ ] negative [- ] fevers [- ] chills [ ] weight loss [ ] weight gain  HEENT: [ ] negative [ ] dry eyes [ ] eye irritation [- ] postnasal drip [ ] nasal congestion  CV: [ ] negative  [- ] chest pain [- ] orthopnea [- ] palpitations [ ] murmur  Resp: [ ] negative [+] cough [- ] shortness of breath [ ] dyspnea [- ] wheezing [ +] sputum [- ] hemoptysis  GI: [ ] negative [ -] nausea [ -] vomiting [ -] diarrhea [ -] constipation [-] abd pain [ ] dysphagia   : [ ] negative [- ] dysuria [- ] nocturia [ -] hematuria [ -] increased urinary frequency  Musculoskeletal: [ ] negative [ -] back pain [ ] myalgias [ ] arthralgias [ ] fracture  Skin: [ ] negative [ ] rash [ -] itch  Neurological: [ ] negative [ ] headache [- ] dizziness [- ] syncope [ ] weakness [ ] numbness  Psychiatric: [ ] negative [ ] anxiety [ -] depression  Endocrine: [ ] negative [+ ] diabetes [ ] thyroid problem  Hematologic/Lymphatic: [ ] negative [ ] anemia [ -] bleeding problem  Allergic/Immunologic: [ ] negative [ ] itchy eyes [ -] nasal discharge [ ] hives [ ] angioedema  [x ] All other systems negative  [ ] Unable to assess ROS because ________    OBJECTIVE:  ICU Vital Signs Last 24 Hrs  T(C): 36.6 (22 Dec 2017 05:08), Max: 36.8 (21 Dec 2017 19:08)  T(F): 97.8 (22 Dec 2017 05:08), Max: 98.3 (21 Dec 2017 19:08)  HR: 81 (22 Dec 2017 05:08) (68 - 81)  BP: 110/61 (22 Dec 2017 05:08) (110/61 - 162/83)  BP(mean): --  ABP: --  ABP(mean): --  RR: 18 (22 Dec 2017 05:08) (16 - 18)  SpO2: 95% (22 Dec 2017 05:08) (95% - 100%)        CAPILLARY BLOOD GLUCOSE      POCT Blood Glucose.: 283 mg/dL (22 Dec 2017 12:47)      PHYSICAL EXAM:  General: NAD  HEENT: PERRLA  Lymph Nodes: None palpable  Neck:  Supple  Respiratory: Coarse BS b/l , no wheezing or crackles   Cardiovascular: RRR, S1+S2+ systolic murmur  Abdomen: Soft, NT, ND, BS+  Extremities: No edema  Skin: No rash  Neurological: AAOx3, grossly non focal  Psychiatry: Normal mood     HOSPITAL MEDICATIONS:  apixaban 5 milliGRAM(s) Oral every 12 hours    trimethoprim  160 mG/sulfamethoxazole 800 mG 1 Tablet(s) Oral two times a day    digoxin     Tablet 0.25 milliGRAM(s) Oral daily  valsartan 40 milliGRAM(s) Oral daily    dextrose 50% Injectable 12.5 Gram(s) IV Push once  dextrose 50% Injectable 25 Gram(s) IV Push once  dextrose 50% Injectable 25 Gram(s) IV Push once  dextrose Gel 1 Dose(s) Oral once PRN  glucagon  Injectable 1 milliGRAM(s) IntraMuscular once PRN  insulin glargine Injectable (LANTUS) 12 Unit(s) SubCutaneous at bedtime  insulin lispro (HumaLOG) corrective regimen sliding scale   SubCutaneous three times a day before meals  insulin lispro Injectable (HumaLOG) 6 Unit(s) SubCutaneous three times a day before meals  predniSONE   Tablet 40 milliGRAM(s) Oral daily    ALBUTerol/ipratropium for Nebulization 3 milliLiter(s) Nebulizer every 6 hours  montelukast 10 milliGRAM(s) Oral daily    ondansetron   Disintegrating Tablet 4 milliGRAM(s) Oral once  pregabalin 150 milliGRAM(s) Oral two times a day    aluminum hydroxide/magnesium hydroxide/simethicone Suspension 30 milliLiter(s) Oral every 4 hours PRN  pantoprazole    Tablet 40 milliGRAM(s) Oral before breakfast        ascorbic acid 500 milliGRAM(s) Oral daily  cholecalciferol 5000 Unit(s) Oral daily  dextrose 5%. 1000 milliLiter(s) IV Continuous <Continuous>            LABS:                        14.0   7.9   )-----------( 301      ( 21 Dec 2017 13:27 )             43.7     Hgb Trend: 14.0<--  12-22    136  |  98  |  18  ----------------------------<  313<H>  5.1   |  26  |  0.92    Ca    9.4      22 Dec 2017 03:38    TPro  7.2  /  Alb  3.8  /  TBili  0.2  /  DBili  x   /  AST  10  /  ALT  12  /  AlkPhos  106      Creatinine Trend: 0.92<--, 0.88<--  PT/INR - ( 21 Dec 2017 13:27 )   PT: 14.0 sec;   INR: 1.29 ratio         PTT - ( 21 Dec 2017 13:27 )  PTT:39.2 sec  Urinalysis Basic - ( 22 Dec 2017 02:45 )    Color: PL Yellow / Appearance: Clear / S.028 / pH: x  Gluc: x / Ketone: Trace  / Bili: Negative / Urobili: Negative   Blood: x / Protein: Negative / Nitrite: Negative   Leuk Esterase: Negative / RBC: x / WBC x   Sq Epi: x / Non Sq Epi: x / Bacteria: x            MICROBIOLOGY:   Rapid Respiratory Viral Panel (17 @ 20:53)    Rapid RVP Result: NotDetec: The FilmArray RVP Rapid uses polymerase chain reaction (PCR) and melt  curve analysis to screen for adenovirus; coronavirus HKU1, NL63, 229E,  OC43; human metapneumovirus (hMPV); human enterovirus/rhinovirus  (Entero/RV); influenza A; influenza A/H1;influenza A/H3; influenza  A/H1-2009; influenza B; parainfluenza viruses 1, 2, 3, 4; respiratory  syncytial virus; Bordetella pertussis; Mycoplasma pneumoniae; and  Chlamydophila pneumoniae.      RADIOLOGY:  < from: CT Chest No Cont (17 @ 17:12) >  LUNGS AND LARGE AIRWAYS: Patent central airways. Bibasilar dependent   linear atelectasis. Mucoid impacted distal airways,small peripheral   nodules and scattered peripheral right lower lobe tree-in-bud opacities.   Findings are stable compared with prior study.  PLEURA: No pleural effusion.  VESSELS: Atheromatous calcifications. The main pulmonary artery measures   3.4cm, can be seen in the setting of pulmonary arterial hypertension.  HEART: Heart size is normal. No pericardial effusion. Aortic valve   calcifications can be seen in the setting of aortic stenosis.  MEDIASTINUM AND MARANDA: No lymphadenopathy.  CHEST WALL AND LOWER NECK: Right chest Mediport with tip in the SVC.  VISUALIZED UPPER ABDOMEN: A 1.7 x 1.2 cm and 2.8 x 1.6 cm exophytic   pancreatic tail cystic lesions, unchanged since 2016.  BONES: Mild spinal degenerative changes. Scattered vertebral body   sclerotic foci, likely bone islands.    [ ] Reviewed and interpreted by me    PULMONARY FUNCTION TESTS:    EKG:

## 2017-12-22 NOTE — H&P ADULT - HISTORY OF PRESENT ILLNESS
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 The patient states that for the past 3-4 weeks she has had a cough with productive green sputum, and was following up with her Pulmonologist Dr. Allison as an outpatient who provided her with Bactrim, which she has taken 4 days total of. She states that yesterday she was sitting at home in the evening and suddenly felt SOB which was non responsive to albuterol along with epigastric discomfort which was relieved by pressing on the area. The patient states that she had a similar episode this morning, which prompted her to come in. She denies any F NVD, palpitations, CP, dysuria, rhinorrhea, MARY, or weight change. States she has notices her stools are darker. Had eaten prior to these epigastric episodes.    Vital Signs Last 24 Hrs  T(C): 36.6 (21 Dec 2017 23:23), Max: 37.1 (21 Dec 2017 13:18)  T(F): 97.9 (21 Dec 2017 23:23), Max: 98.7 (21 Dec 2017 13:18)  HR: 80 (21 Dec 2017 23:23) (68 - 88)  BP: 162/83 (21 Dec 2017 23:23) (125/62 - 176/66)  BP(mean): --  RR: 16 (21 Dec 2017 23:23) (16 - 18)  SpO2: 97% (21 Dec 2017 23:23) (96% - 100%)    In the ED the patient was given Solumedrol, Duonebs, with mild improvement. CT Chest, DVT study performed.

## 2017-12-22 NOTE — PROGRESS NOTE ADULT - SUBJECTIVE AND OBJECTIVE BOX
Patient is a 67y old  Female who presents with a chief complaint of SOB and epigastric discomfort (22 Dec 2017 01:01)      SUBJECTIVE / OVERNIGHT EVENTS:  Patient denies chest pain, SOB, palpitations, abdominal pain, nausea, vomiting, chills, and cough    MEDICATIONS  (STANDING):  ALBUTerol/ipratropium for Nebulization 3 milliLiter(s) Nebulizer every 6 hours  apixaban 5 milliGRAM(s) Oral every 12 hours  ascorbic acid 500 milliGRAM(s) Oral daily  cholecalciferol 5000 Unit(s) Oral daily  dextrose 5%. 1000 milliLiter(s) (50 mL/Hr) IV Continuous <Continuous>  dextrose 50% Injectable 12.5 Gram(s) IV Push once  dextrose 50% Injectable 25 Gram(s) IV Push once  dextrose 50% Injectable 25 Gram(s) IV Push once  digoxin     Tablet 0.25 milliGRAM(s) Oral daily  insulin glargine Injectable (LANTUS) 12 Unit(s) SubCutaneous at bedtime  insulin lispro (HumaLOG) corrective regimen sliding scale   SubCutaneous three times a day before meals  insulin lispro Injectable (HumaLOG) 4 Unit(s) SubCutaneous three times a day before meals  montelukast 10 milliGRAM(s) Oral daily  ondansetron   Disintegrating Tablet 4 milliGRAM(s) Oral once  pantoprazole    Tablet 40 milliGRAM(s) Oral before breakfast  predniSONE   Tablet 40 milliGRAM(s) Oral daily  pregabalin 150 milliGRAM(s) Oral two times a day  valsartan 40 milliGRAM(s) Oral daily    MEDICATIONS  (PRN):  aluminum hydroxide/magnesium hydroxide/simethicone Suspension 30 milliLiter(s) Oral every 4 hours PRN Dyspepsia  dextrose Gel 1 Dose(s) Oral once PRN Blood Glucose LESS THAN 70 milliGRAM(s)/deciliter  glucagon  Injectable 1 milliGRAM(s) IntraMuscular once PRN Glucose LESS THAN 70 milligrams/deciliter      T(F): 97.8 ( @ 05:08), Max: 98.7 ( @ 13:18)  HR: 81 ( @ 05:08) (68 - 88)  BP: 110/61 ( @ 05:08) (110/61 - 176/66)  RR: 18 ( @ 05:08) (16 - 18)  SpO2: 95% (12-22 @ 05:08) (95% - 100%)  CAPILLARY BLOOD GLUCOSE      POCT Blood Glucose.: 300 mg/dL (22 Dec 2017 00:23)  POCT Blood Glucose.: 184 mg/dL (21 Dec 2017 20:10)    I&O's Summary      PHYSICAL EXAM:  GEN: Appears in no acute distress  HEENT: PERRLA, EOMI and accommodate, neck supple, no lymphadenopathy, no JVD  MOUTH: moist mucous membranes, no exudates or lesions   PULM: Clear to auscultation bilaterally, no wheezes, rales, rhonchi  CV: RRR, S1S2, no murmurs, rubs or gallops  Abdominal: Soft, nontender to palpation, non-distended, normoactive bowel sounds  Extremities: WWP, No edema, + peripheral pulses  NEURO: AAOx3, moving all four extremities spontaneously  Skin: No rashes, lesions, hematomas, ecchymosis      LABS:  Labs personally reviewed.                        14.0   7.9   )-----------( 301      ( 21 Dec 2017 13:27 )             43.7     Hgb Trend: 14.0<--  12-22    136  |  98  |  18  ----------------------------<  313<H>  5.1   |  26  |  0.92    Ca    9.4      22 Dec 2017 03:38    TPro  7.2  /  Alb  3.8  /  TBili  0.2  /  DBili  x   /  AST  10  /  ALT  12  /  AlkPhos  106  12-22    Creatinine Trend: 0.92<--, 0.88<--  PT/INR - ( 21 Dec 2017 13:27 )   PT: 14.0 sec;   INR: 1.29 ratio         PTT - ( 21 Dec 2017 13:27 )  PTT:39.2 sec  Urinalysis Basic - ( 22 Dec 2017 02:45 )    Color: PL Yellow / Appearance: Clear / S.028 / pH: x  Gluc: x / Ketone: Trace  / Bili: Negative / Urobili: Negative   Blood: x / Protein: Negative / Nitrite: Negative   Leuk Esterase: Negative / RBC: x / WBC x   Sq Epi: x / Non Sq Epi: x / Bacteria: x        RADIOLOGY & ADDITIONAL TESTS:  Imaging Personally Reviewed.    Consultants:      Dejuan Erickson PGY-1 Pager: CASIE [phone]/ [phone]  Night Float: CASIE [id number]/ NS Patient is a 67y old  Female who presents with a chief complaint of SOB and epigastric discomfort (22 Dec 2017 01:01)      SUBJECTIVE / OVERNIGHT EVENTS: No events overnight. Tolerating PO. Concerned over antibiotics her pulmonologist placed her on. Indicating her dyspnea has improved as well her epigastric pain.  Patient denies chest pain, SOB, palpitations, abdominal pain, nausea, vomiting, chills, and cough.    MEDICATIONS  (STANDING):  ALBUTerol/ipratropium for Nebulization 3 milliLiter(s) Nebulizer every 6 hours  apixaban 5 milliGRAM(s) Oral every 12 hours  ascorbic acid 500 milliGRAM(s) Oral daily  cholecalciferol 5000 Unit(s) Oral daily  dextrose 5%. 1000 milliLiter(s) (50 mL/Hr) IV Continuous <Continuous>  dextrose 50% Injectable 12.5 Gram(s) IV Push once  dextrose 50% Injectable 25 Gram(s) IV Push once  dextrose 50% Injectable 25 Gram(s) IV Push once  digoxin     Tablet 0.25 milliGRAM(s) Oral daily  insulin glargine Injectable (LANTUS) 12 Unit(s) SubCutaneous at bedtime  insulin lispro (HumaLOG) corrective regimen sliding scale   SubCutaneous three times a day before meals  insulin lispro Injectable (HumaLOG) 4 Unit(s) SubCutaneous three times a day before meals  montelukast 10 milliGRAM(s) Oral daily  ondansetron   Disintegrating Tablet 4 milliGRAM(s) Oral once  pantoprazole    Tablet 40 milliGRAM(s) Oral before breakfast  predniSONE   Tablet 40 milliGRAM(s) Oral daily  pregabalin 150 milliGRAM(s) Oral two times a day  valsartan 40 milliGRAM(s) Oral daily    MEDICATIONS  (PRN):  aluminum hydroxide/magnesium hydroxide/simethicone Suspension 30 milliLiter(s) Oral every 4 hours PRN Dyspepsia  dextrose Gel 1 Dose(s) Oral once PRN Blood Glucose LESS THAN 70 milliGRAM(s)/deciliter  glucagon  Injectable 1 milliGRAM(s) IntraMuscular once PRN Glucose LESS THAN 70 milligrams/deciliter      T(F): 97.8 ( @ 05:08), Max: 98.7 ( @ 13:18)  HR: 81 ( @ 05:08) (68 - 88)  BP: 110/61 ( @ 05:08) (110/61 - 176/66)  RR: 18 ( @ 05:08) (16 - 18)  SpO2: 95% ( @ 05:08) (95% - 100%)  CAPILLARY BLOOD GLUCOSE      POCT Blood Glucose.: 300 mg/dL (22 Dec 2017 00:23)  POCT Blood Glucose.: 184 mg/dL (21 Dec 2017 20:10)    I&O's Summary      PHYSICAL EXAM:  GEN: Appears in no acute distress  HEENT: PERRLA, EOMI and accommodate, neck supple, no lymphadenopathy, no JVD  MOUTH: moist mucous membranes, no exudates or lesions  PULM: wheezes on expiration throughout lung fields, rhonchi throughout lung fields  CV: RRR, S1S2, no murmurs, rubs or gallops  Abdominal: Soft, nontender to palpation, non-distended, normoactive bowel sounds  Extremities: WWP, No edema, + peripheral pulses  NEURO: AAOx3, moving all four extremities spontaneously  Skin: No rashes, lesions, hematomas, ecchymosis      LABS:  Labs personally reviewed.                        14.0   7.9   )-----------( 301      ( 21 Dec 2017 13:27 )             43.7     Hgb Trend: 14.0<--  1222    136  |  98  |  18  ----------------------------<  313<H>  5.1   |  26  |  0.92    Ca    9.4      22 Dec 2017 03:38    TPro  7.2  /  Alb  3.8  /  TBili  0.2  /  DBili  x   /  AST  10  /  ALT  12  /  AlkPhos  106      Creatinine Trend: 0.92<--, 0.88<--  PT/INR - ( 21 Dec 2017 13:27 )   PT: 14.0 sec;   INR: 1.29 ratio         PTT - ( 21 Dec 2017 13:27 )  PTT:39.2 sec  Urinalysis Basic - ( 22 Dec 2017 02:45 )    Color: PL Yellow / Appearance: Clear / S.028 / pH: x  Gluc: x / Ketone: Trace  / Bili: Negative / Urobili: Negative   Blood: x / Protein: Negative / Nitrite: Negative   Leuk Esterase: Negative / RBC: x / WBC x   Sq Epi: x / Non Sq Epi: x / Bacteria: x        RADIOLOGY & ADDITIONAL TESTS:  Imaging Personally Reviewed.    Consultants:      Dejuan Erickson PGY-1 Pager: CASIE [phone]/ [phone]  Night Float: CASIE [id number]/ NS

## 2017-12-22 NOTE — PROGRESS NOTE ADULT - PROBLEM SELECTOR PLAN 5
Will initiate medrol after prednisone course is finished. -Will initiate medrol after prednisone course is finished.

## 2017-12-22 NOTE — PROGRESS NOTE ADULT - PROBLEM SELECTOR PLAN 3
-CT chest notes pulmonary HTN which could contribute to SOB.  -TTE ordered to further evaluate. -CT chest notes pulmonary HTN which could contribute to SOB.  -TTE ordered to further evaluate

## 2017-12-22 NOTE — H&P ADULT - NSHPLABSRESULTS_GEN_ALL_CORE
14.0   7.9   )-----------( 301      ( 21 Dec 2017 13:27 )             43.7     12-21    139  |  100  |  16  ----------------------------<  152<H>  4.7   |  24  |  0.88    Ca    9.5      21 Dec 2017 13:27    TPro  7.7  /  Alb  4.2  /  TBili  0.1<L>  /  DBili  x   /  AST  15  /  ALT  14  /  AlkPhos  109  12-21      < from: CT Chest No Cont (12.21.17 @ 17:12) >    IMPRESSION:     No pulmonary mass or pneumonia.    Main pulmonary artery measures 3.4 cm, which can be seen in the setting   of pulmonary arterial hypertension.    Two exophytic pancreatic tail cyst, unchanged since 9/22/2016. Further   evaluation with with nonemergent contrast-enhanced MRI can be obtained.    < end of copied text >    ED DVT: neg    RVP negative    EKG: NSR 14.0   7.9   )-----------( 301      ( 21 Dec 2017 13:27 )             43.7     12-21    139  |  100  |  16  ----------------------------<  152<H>  4.7   |  24  |  0.88    Ca    9.5      21 Dec 2017 13:27    TPro  7.7  /  Alb  4.2  /  TBili  0.1<L>  /  DBili  x   /  AST  15  /  ALT  14  /  AlkPhos  109  12-21      < from: CT Chest No Cont (12.21.17 @ 17:12) >    IMPRESSION:     No pulmonary mass or pneumonia.    Main pulmonary artery measures 3.4 cm, which can be seen in the setting   of pulmonary arterial hypertension.    Two exophytic pancreatic tail cyst, unchanged since 9/22/2016. Further   evaluation with with nonemergent contrast-enhanced MRI can be obtained.    < end of copied text >    ED DVT: neg    RVP negative    EKG: NSR    Personally reviewed EKG, labs and imaging.

## 2017-12-23 ENCOUNTER — TRANSCRIPTION ENCOUNTER (OUTPATIENT)
Age: 67
End: 2017-12-23

## 2017-12-23 LAB
GLUCOSE BLDC GLUCOMTR-MCNC: 200 MG/DL — HIGH (ref 70–99)
GLUCOSE BLDC GLUCOMTR-MCNC: 203 MG/DL — HIGH (ref 70–99)
GLUCOSE BLDC GLUCOMTR-MCNC: 289 MG/DL — HIGH (ref 70–99)
GLUCOSE BLDC GLUCOMTR-MCNC: 293 MG/DL — HIGH (ref 70–99)
GLUCOSE BLDC GLUCOMTR-MCNC: 350 MG/DL — HIGH (ref 70–99)

## 2017-12-23 PROCEDURE — 99232 SBSQ HOSP IP/OBS MODERATE 35: CPT | Mod: GC

## 2017-12-23 PROCEDURE — 99233 SBSQ HOSP IP/OBS HIGH 50: CPT | Mod: GC

## 2017-12-23 RX ORDER — INSULIN GLARGINE 100 [IU]/ML
16 INJECTION, SOLUTION SUBCUTANEOUS AT BEDTIME
Qty: 0 | Refills: 0 | Status: DISCONTINUED | OUTPATIENT
Start: 2017-12-23 | End: 2017-12-24

## 2017-12-23 RX ORDER — BUDESONIDE AND FORMOTEROL FUMARATE DIHYDRATE 160; 4.5 UG/1; UG/1
2 AEROSOL RESPIRATORY (INHALATION)
Qty: 0 | Refills: 0 | Status: DISCONTINUED | OUTPATIENT
Start: 2017-12-23 | End: 2017-12-29

## 2017-12-23 RX ORDER — TIOTROPIUM BROMIDE 18 UG/1
1 CAPSULE ORAL; RESPIRATORY (INHALATION) DAILY
Qty: 0 | Refills: 0 | Status: DISCONTINUED | OUTPATIENT
Start: 2017-12-23 | End: 2017-12-26

## 2017-12-23 RX ORDER — SODIUM CHLORIDE 9 MG/ML
4 INJECTION INTRAMUSCULAR; INTRAVENOUS; SUBCUTANEOUS DAILY
Qty: 0 | Refills: 0 | Status: DISCONTINUED | OUTPATIENT
Start: 2017-12-23 | End: 2017-12-29

## 2017-12-23 RX ORDER — OLANZAPINE 15 MG/1
2.5 TABLET, FILM COATED ORAL ONCE
Qty: 0 | Refills: 0 | Status: COMPLETED | OUTPATIENT
Start: 2017-12-23 | End: 2017-12-23

## 2017-12-23 RX ORDER — INSULIN LISPRO 100/ML
8 VIAL (ML) SUBCUTANEOUS
Qty: 0 | Refills: 0 | Status: DISCONTINUED | OUTPATIENT
Start: 2017-12-23 | End: 2017-12-29

## 2017-12-23 RX ADMIN — Medication 6: at 18:47

## 2017-12-23 RX ADMIN — Medication 5 MILLIGRAM(S): at 23:51

## 2017-12-23 RX ADMIN — Medication 5000 UNIT(S): at 13:35

## 2017-12-23 RX ADMIN — Medication 3 MILLILITER(S): at 17:58

## 2017-12-23 RX ADMIN — Medication 5 MILLIGRAM(S): at 09:35

## 2017-12-23 RX ADMIN — PANTOPRAZOLE SODIUM 40 MILLIGRAM(S): 20 TABLET, DELAYED RELEASE ORAL at 05:34

## 2017-12-23 RX ADMIN — Medication 6 UNIT(S): at 09:16

## 2017-12-23 RX ADMIN — Medication 3 MILLILITER(S): at 11:46

## 2017-12-23 RX ADMIN — APIXABAN 5 MILLIGRAM(S): 2.5 TABLET, FILM COATED ORAL at 05:35

## 2017-12-23 RX ADMIN — Medication 150 MILLIGRAM(S): at 05:35

## 2017-12-23 RX ADMIN — Medication 8 UNIT(S): at 13:26

## 2017-12-23 RX ADMIN — Medication 3 MILLILITER(S): at 05:33

## 2017-12-23 RX ADMIN — Medication 500 MILLIGRAM(S): at 13:22

## 2017-12-23 RX ADMIN — Medication 4: at 09:16

## 2017-12-23 RX ADMIN — INSULIN GLARGINE 16 UNIT(S): 100 INJECTION, SOLUTION SUBCUTANEOUS at 22:38

## 2017-12-23 RX ADMIN — SODIUM CHLORIDE 4 MILLILITER(S): 9 INJECTION INTRAMUSCULAR; INTRAVENOUS; SUBCUTANEOUS at 15:17

## 2017-12-23 RX ADMIN — Medication 3 MILLILITER(S): at 23:53

## 2017-12-23 RX ADMIN — Medication 0.25 MILLIGRAM(S): at 05:35

## 2017-12-23 RX ADMIN — Medication 5 MILLIGRAM(S): at 13:23

## 2017-12-23 RX ADMIN — TIOTROPIUM BROMIDE 1 CAPSULE(S): 18 CAPSULE ORAL; RESPIRATORY (INHALATION) at 15:16

## 2017-12-23 RX ADMIN — Medication 8: at 13:20

## 2017-12-23 RX ADMIN — Medication 5 MILLIGRAM(S): at 18:49

## 2017-12-23 RX ADMIN — Medication 8 UNIT(S): at 18:47

## 2017-12-23 RX ADMIN — Medication 150 MILLIGRAM(S): at 18:52

## 2017-12-23 RX ADMIN — Medication 1 TABLET(S): at 05:35

## 2017-12-23 RX ADMIN — MONTELUKAST 10 MILLIGRAM(S): 4 TABLET, CHEWABLE ORAL at 13:22

## 2017-12-23 RX ADMIN — Medication 40 MILLIGRAM(S): at 05:34

## 2017-12-23 RX ADMIN — FAMOTIDINE 20 MILLIGRAM(S): 10 INJECTION INTRAVENOUS at 13:23

## 2017-12-23 RX ADMIN — APIXABAN 5 MILLIGRAM(S): 2.5 TABLET, FILM COATED ORAL at 18:48

## 2017-12-23 RX ADMIN — Medication 1 TABLET(S): at 18:49

## 2017-12-23 RX ADMIN — BUDESONIDE AND FORMOTEROL FUMARATE DIHYDRATE 2 PUFF(S): 160; 4.5 AEROSOL RESPIRATORY (INHALATION) at 17:58

## 2017-12-23 RX ADMIN — OLANZAPINE 2.5 MILLIGRAM(S): 15 TABLET, FILM COATED ORAL at 23:51

## 2017-12-23 RX ADMIN — VALSARTAN 40 MILLIGRAM(S): 80 TABLET ORAL at 05:34

## 2017-12-23 NOTE — DISCHARGE NOTE ADULT - CARE PLAN
Principal Discharge DX:	Chronic obstructive pulmonary disease with acute exacerbation  Goal:	Resolution and follow up with your primary care physician 2-3 weeks after discharge  Instructions for follow-up, activity and diet:	You came to the hospital due to shortness of breath. A pulmonologist that works with Dr. Allison saw you while you were in the hospital and believes this was not a lung issue. We also checked your heart and there was no concern for a heart attack. We believed this was a gastrointestinal issue. Please continue to take your protonix and follow up with the GI doctor 2-3 weeks after discharge.  Secondary Diagnosis:	Epigastric abdominal pain  Goal:	Resolution and follow up with your primary care physician 2-3 weeks after discharge  Instructions for follow-up, activity and diet:	The discomfort you were experiencing was most likely from a gastrointestinal issue. You saw a GI doctor and they thought.... Principal Discharge DX:	Chronic obstructive pulmonary disease with acute exacerbation  Goal:	Resolution and follow up with your primary care physician 2-3 weeks after discharge  Instructions for follow-up, activity and diet:	You came to the hospital due to shortness of breath. Dr. Allison saw you while you were in the hospital and believes this was not a lung issue. We also checked your heart and there was no concern for a heart attack. We believed this was a gastrointestinal issue. Continue to take all your pulmonary medications as prescribed. Please continue to take your Protonix and follow up with Dr. Hall, your gastroenterologist, within 2-3 weeks of discharge.  Secondary Diagnosis:	Epigastric abdominal pain  Goal:	Resolution and follow up with your primary care physician 2-3 weeks after discharge  Instructions for follow-up, activity and diet:	The discomfort you were experiencing was most likely from a gastrointestinal issue. You received an EGD while you were hospitalized which showed some plaques in your esophagus. Please follow up with Dr. Hall, your gastroenterologist, within 2-3 weeks of discharge for your biopsy results and for further management. Please continue to take all medications as prescribed. Principal Discharge DX:	Chronic obstructive pulmonary disease with acute exacerbation  Goal:	Resolution and follow up with your primary care physician 2-3 weeks after discharge  Instructions for follow-up, activity and diet:	You came to the hospital due to shortness of breath. Dr. Allison saw you while you were in the hospital and believes this was not a lung issue. We also checked your heart and there was no concern for a heart attack. We believed this was a gastrointestinal issue. Continue to take all your pulmonary medications as prescribed. Please continue to take your Protonix and follow up with Dr. Hall, your gastroenterologist, within 2-3 weeks of discharge.  Secondary Diagnosis:	Epigastric abdominal pain  Goal:	Resolution and follow up with your primary care physician 2-3 weeks after discharge  Instructions for follow-up, activity and diet:	The discomfort you were experiencing was most likely from a gastrointestinal issue. You received an EGD while you were hospitalized which showed some plaques in your esophagus. The biopsy showed minimal chronic inactive gastritis. Please follow up with Dr. Hall, your gastroenterologist, within 2-3 weeks of discharge for your biopsy results and for further management. Please continue to take all medications as prescribed.  Secondary Diagnosis:	Thrush  Goal:	Resolution  Instructions for follow-up, activity and diet:	You were found to have some plaques in your mouth which appeared to be oral thrush. You were prescribed a 14 day course of Diflucan. Continue to take the rest of your Diflucan at home. Please follow up with your PCP within 2 weeks of discharge for further management.

## 2017-12-23 NOTE — CONSULT NOTE ADULT - SUBJECTIVE AND OBJECTIVE BOX
Chief Complaint:  Patient is a 67y old  Female who presents with a chief complaint of SOB and epigastric discomfort (23 Dec 2017 16:31)    HPI:  67 year old female with COPD, asthma, tracheobronchomalacia s/p tracheo-bronchoplasty (2016), Afib (on Eliquis), HTN, Adrenal Insufficiency (on chronic medrol), DM2, HTN, remote hx of DVT and squamous cell CA of the anus s/p chemo and RT who presented with the complaint of SOB and epigastric discomfort       Allergies:  ampicillin (Short breath)  aspirin (Short breath)  Avelox (Short breath)  codeine (Short breath)  Dilaudid (Short breath)  iodine (Short breath)  penicillin (Short breath)  shellfish (Anaphylaxis)  tetanus toxoid (Short breath)  Valium (Short breath)      Home Medications:    Hospital Medications:  ALBUTerol/ipratropium for Nebulization 3 milliLiter(s) Nebulizer every 6 hours  aluminum hydroxide/magnesium hydroxide/simethicone Suspension 30 milliLiter(s) Oral every 4 hours PRN  apixaban 5 milliGRAM(s) Oral every 12 hours  ascorbic acid 500 milliGRAM(s) Oral daily  buDESOnide 160 MICROgram(s)/formoterol 4.5 MICROgram(s) Inhaler 2 Puff(s) Inhalation two times a day  cholecalciferol 5000 Unit(s) Oral daily  dextrose 5%. 1000 milliLiter(s) IV Continuous <Continuous>  dextrose 50% Injectable 12.5 Gram(s) IV Push once  dextrose 50% Injectable 25 Gram(s) IV Push once  dextrose 50% Injectable 25 Gram(s) IV Push once  dextrose Gel 1 Dose(s) Oral once PRN  digoxin     Tablet 0.25 milliGRAM(s) Oral daily  famotidine    Tablet 20 milliGRAM(s) Oral daily  glucagon  Injectable 1 milliGRAM(s) IntraMuscular once PRN  insulin glargine Injectable (LANTUS) 16 Unit(s) SubCutaneous at bedtime  insulin lispro (HumaLOG) corrective regimen sliding scale   SubCutaneous three times a day before meals  insulin lispro Injectable (HumaLOG) 8 Unit(s) SubCutaneous three times a day before meals  metoclopramide 5 milliGRAM(s) Oral every 6 hours  montelukast 10 milliGRAM(s) Oral daily  ondansetron   Disintegrating Tablet 4 milliGRAM(s) Oral once  pantoprazole    Tablet 40 milliGRAM(s) Oral before breakfast  predniSONE   Tablet 40 milliGRAM(s) Oral daily  pregabalin 150 milliGRAM(s) Oral two times a day  sodium chloride 3%  Inhalation 4 milliLiter(s) Inhalation daily  tiotropium 18 MICROgram(s) Capsule 1 Capsule(s) Inhalation daily  trimethoprim  160 mG/sulfamethoxazole 800 mG 1 Tablet(s) Oral two times a day  valsartan 40 milliGRAM(s) Oral daily      PMHX/PSHX:  Aortic disease  Colorectal cancer  Tracheobronchomalacia  Hypertension  Asthma  Pelvic fracture  Aortic insufficiency  Adrenal insufficiency  COPD (chronic obstructive pulmonary disease)  Hypertension  Diabetes  Atrial fibrillation  H/O pelvic surgery  Exostosis of orbit, left  History of sinus surgery  H/O total knee replacement, bilateral  History of partial hysterectomy      Family history:  Family history of diabetes mellitus type II  Family history of breast cancer (Sibling)  Family history of asthma      Social History:     ROS:     General:  No wt loss, fevers, chills, night sweats, fatigue,   Eyes:  Good vision, no reported pain  ENT:  No sore throat, pain, runny nose, dysphagia  CV:  No pain, palpitations, hypo/hypertension  Resp:  No dyspnea, cough, tachypnea, wheezing  GI:  See HPI  :  No pain, bleeding, incontinence, nocturia  Muscle:  No pain, weakness  Neuro:  No weakness, tingling, memory problems  Psych:  No fatigue, insomnia, mood problems, depression  Endocrine:  No polyuria, polydipsia, cold/heat intolerance  Heme:  No petechiae, ecchymosis, easy bruisability  Skin:  No rash, edema      PHYSICAL EXAM:     GENERAL:  Appears stated age, well-groomed, well-nourished, no distress  HEENT:  NC/AT,  conjunctivae clear and pink,  no JVD  CHEST:  Full & symmetric excursion, no increased effort, breath sounds clear  HEART:  Regular rhythm, S1, S2, no murmur/rub/S3/S4, no abdominal bruit, no edema  ABDOMEN:  Soft, non-tender, non-distended, normoactive bowel sounds,  no masses ,  EXTREMITIES:  no cyanosis,clubbing or edema  SKIN:  No rash/erythema/ecchymoses/petechiae/wounds/abscess/warm/dry  NEURO:  Alert, oriented    Vital Signs:  Vital Signs Last 24 Hrs  T(C): 36.8 (23 Dec 2017 14:11), Max: 36.8 (23 Dec 2017 14:11)  T(F): 98.2 (23 Dec 2017 14:11), Max: 98.2 (23 Dec 2017 14:11)  HR: 93 (23 Dec 2017 14:11) (63 - 658)  BP: 133/68 (23 Dec 2017 14:11) (118/64 - 133/68)  BP(mean): --  RR: 18 (23 Dec 2017 14:11) (18 - 18)  SpO2: 97% (23 Dec 2017 14:11) (95% - 97%)  Daily     Daily     LABS:        136  |  98  |  18  ----------------------------<  313<H>  5.1   |  26  |  0.92    Ca    9.4      22 Dec 2017 03:38    TPro  7.2  /  Alb  3.8  /  TBili  0.2  /  DBili  x   /  AST  10  /  ALT  12  /  AlkPhos  106      LIVER FUNCTIONS - ( 22 Dec 2017 03:38 )  Alb: 3.8 g/dL / Pro: 7.2 g/dL / ALK PHOS: 106 U/L / ALT: 12 U/L RC / AST: 10 U/L / GGT: x             Urinalysis Basic - ( 22 Dec 2017 02:45 )    Color: PL Yellow / Appearance: Clear / S.028 / pH: x  Gluc: x / Ketone: Trace  / Bili: Negative / Urobili: Negative   Blood: x / Protein: Negative / Nitrite: Negative   Leuk Esterase: Negative / RBC: x / WBC x   Sq Epi: x / Non Sq Epi: x / Bacteria: x          Imaging: Chief Complaint:  Patient is a 67y old  Female who presents with a chief complaint of SOB and epigastric discomfort (23 Dec 2017 16:31)    HPI:  67 year old female with COPD, asthma, tracheobronchomalacia s/p tracheo-bronchoplasty (2016), Afib (on Eliquis), HTN, Adrenal Insufficiency (on chronic medrol), DM2, HTN, remote hx of DVT and squamous cell CA of the anus s/p chemo and RT who presented with the complaint of SOB and epigastric discomfort       Allergies:  ampicillin (Short breath)  aspirin (Short breath)  Avelox (Short breath)  codeine (Short breath)  Dilaudid (Short breath)  iodine (Short breath)  penicillin (Short breath)  shellfish (Anaphylaxis)  tetanus toxoid (Short breath)  Valium (Short breath)      Home Medications:   Incomplete Medication History as of 21-Dec-2017 22:41 documented in Structured Notes  · 	pregabalin 150 mg oral capsule: 1 cap(s) orally 2 times a day, Last Dose Taken:    · 	albuterol CFC free 90 mcg/inh inhalation aerosol: 1 puff(s) inhaled every 4 hours, As Needed - for shortness of breath and/or wheezing, Last Dose Taken:    · 	digoxin 250 mcg (0.25 mg) oral tablet: 1 tab(s) orally once a day, Last Dose Taken:    · 	pantoprazole 40 mg oral delayed release tablet: 1 tab(s) orally once a day (before a meal), Last Dose Taken:    · 	Lantus Solostar Pen 100 units/mL subcutaneous solution: 10 to 22 unit(s) subcutaneous once a day (at bedtime), Last Dose Taken:    · 	Spiriva 18 mcg inhalation capsule: 1 cap(s) inhaled once a day, Last Dose Taken:    · 	montelukast 10 mg oral tablet: 1 tab(s) orally once a day, Last Dose Taken:    · 	Breo Ellipta 200 mcg-25 mcg/inh inhalation powder: 1 puff(s) inhaled once a day, Last Dose Taken:    · 	Eliquis 5 mg oral tablet: 1 tab(s) orally 2 times a day, Last Dose Taken:    · 	Xyzal 5 mg oral tablet: 1 tab(s) orally once a day (in the evening), Last Dose Taken:    · 	Xolair 150 mg subcutaneous injection: subcutaneous every 2 weeks, Last Dose Taken:    · 	methylPREDNISolone 4 mg oral tablet: 1 tab(s) orally once a day, Last Dose Taken:    · 	Diovan 40 mg oral tablet: 1 tab(s) orally once a day, Last Dose Taken:    · 	Bactrim  mg-160 mg oral tablet: 1 tab(s) orally 2 times a day (for 10 days), Last Dose Taken:    · 	Vitamin D3 5000 intl units oral tablet: 1 tab(s) orally once a day, Last Dose Taken:    · 	Vitamin C 500 mg oral tablet: 1 tab(s) orally once a day, Last Dose Taken:    · 	Victoza 18 mg/3 mL subcutaneous solution: 1.8 milligram(s) subcutaneous once a day (in the morning), Last Dose Taken:    · 	NovoLOG 100 units/mL subcutaneous solution: about 7 unit(s) subcutaneous 3 times a day, Last Dose Taken:    · 	albuterol inhalation solution: inhaled 2 times a day, Last Dose Taken:    · 	mucomyst inhalation solution: inhaled 2 times a day, Last Dose Taken:     Hospital Medications:  ALBUTerol/ipratropium for Nebulization 3 milliLiter(s) Nebulizer every 6 hours  aluminum hydroxide/magnesium hydroxide/simethicone Suspension 30 milliLiter(s) Oral every 4 hours PRN  apixaban 5 milliGRAM(s) Oral every 12 hours  ascorbic acid 500 milliGRAM(s) Oral daily  buDESOnide 160 MICROgram(s)/formoterol 4.5 MICROgram(s) Inhaler 2 Puff(s) Inhalation two times a day  cholecalciferol 5000 Unit(s) Oral daily  dextrose 5%. 1000 milliLiter(s) IV Continuous <Continuous>  dextrose 50% Injectable 12.5 Gram(s) IV Push once  dextrose 50% Injectable 25 Gram(s) IV Push once  dextrose 50% Injectable 25 Gram(s) IV Push once  dextrose Gel 1 Dose(s) Oral once PRN  digoxin     Tablet 0.25 milliGRAM(s) Oral daily  famotidine    Tablet 20 milliGRAM(s) Oral daily  glucagon  Injectable 1 milliGRAM(s) IntraMuscular once PRN  insulin glargine Injectable (LANTUS) 16 Unit(s) SubCutaneous at bedtime  insulin lispro (HumaLOG) corrective regimen sliding scale   SubCutaneous three times a day before meals  insulin lispro Injectable (HumaLOG) 8 Unit(s) SubCutaneous three times a day before meals  metoclopramide 5 milliGRAM(s) Oral every 6 hours  montelukast 10 milliGRAM(s) Oral daily  ondansetron   Disintegrating Tablet 4 milliGRAM(s) Oral once  pantoprazole    Tablet 40 milliGRAM(s) Oral before breakfast  predniSONE   Tablet 40 milliGRAM(s) Oral daily  pregabalin 150 milliGRAM(s) Oral two times a day  sodium chloride 3%  Inhalation 4 milliLiter(s) Inhalation daily  tiotropium 18 MICROgram(s) Capsule 1 Capsule(s) Inhalation daily  trimethoprim  160 mG/sulfamethoxazole 800 mG 1 Tablet(s) Oral two times a day  valsartan 40 milliGRAM(s) Oral daily      PMHX/PSHX:  Aortic disease  Colorectal cancer  Tracheobronchomalacia  Hypertension  Asthma  Pelvic fracture  Aortic insufficiency  Adrenal insufficiency  COPD (chronic obstructive pulmonary disease)  Hypertension  Diabetes  Atrial fibrillation  H/O pelvic surgery  Exostosis of orbit, left  History of sinus surgery  H/O total knee replacement, bilateral  History of partial hysterectomy      Family history:  Family history of diabetes mellitus type II  Family history of breast cancer (Sibling)  Family history of asthma      Social History:   	Visiting daughter, originally from [place]  No EtOH smoking hx    ROS:     General:  No wt loss, fevers, chills, night sweats, fatigue,   Eyes:  Good vision, no reported pain  ENT:  No sore throat, pain, runny nose, dysphagia  CV:  No pain, palpitations, hypo/hypertension  Resp:  No dyspnea, cough, tachypnea, wheezing  GI:  See HPI  :  No pain, bleeding, incontinence, nocturia  Muscle:  No pain, weakness  Neuro:  No weakness, tingling, memory problems  Psych:  No fatigue, insomnia, mood problems, depression  Endocrine:  No polyuria, polydipsia, cold/heat intolerance  Heme:  No petechiae, ecchymosis, easy bruisability  Skin:  No rash, edema      PHYSICAL EXAM:     GENERAL:  Appears stated age, well-groomed, well-nourished, no distress  HEENT:  NC/AT,  conjunctivae clear and pink,  no JVD  CHEST:  Full & symmetric excursion, no increased effort, breath sounds clear  HEART:  Regular rhythm, S1, S2, no murmur/rub/S3/S4, no abdominal bruit, no edema  ABDOMEN:  Soft, non-tender, non-distended, normoactive bowel sounds,  no masses ,  EXTREMITIES:  no cyanosis,clubbing or edema  SKIN:  No rash/erythema/ecchymoses/petechiae/wounds/abscess/warm/dry  NEURO:  Alert, oriented    Vital Signs:  Vital Signs Last 24 Hrs  T(C): 36.8 (23 Dec 2017 14:11), Max: 36.8 (23 Dec 2017 14:11)  T(F): 98.2 (23 Dec 2017 14:11), Max: 98.2 (23 Dec 2017 14:11)  HR: 93 (23 Dec 2017 14:11) (63 - 658)  BP: 133/68 (23 Dec 2017 14:11) (118/64 - 133/68)  BP(mean): --  RR: 18 (23 Dec 2017 14:11) (18 - 18)  SpO2: 97% (23 Dec 2017 14:11) (95% - 97%)  Daily     Daily     LABS:        136  |  98  |  18  ----------------------------<  313<H>  5.1   |  26  |  0.92    Ca    9.4      22 Dec 2017 03:38    TPro  7.2  /  Alb  3.8  /  TBili  0.2  /  DBili  x   /  AST  10  /  ALT  12  /  AlkPhos  106  -    LIVER FUNCTIONS - ( 22 Dec 2017 03:38 )  Alb: 3.8 g/dL / Pro: 7.2 g/dL / ALK PHOS: 106 U/L / ALT: 12 U/L RC / AST: 10 U/L / GGT: x             Urinalysis Basic - ( 22 Dec 2017 02:45 )    Color: PL Yellow / Appearance: Clear / S.028 / pH: x  Gluc: x / Ketone: Trace  / Bili: Negative / Urobili: Negative   Blood: x / Protein: Negative / Nitrite: Negative   Leuk Esterase: Negative / RBC: x / WBC x   Sq Epi: x / Non Sq Epi: x / Bacteria: x          Imaging:      < from: CT Abdomen and Pelvis w/ Oral Cont (17 @ 09:33) >  FINDINGS:    LOWER CHEST: Redemonstrated right lower lobe peripheral nodular opacity   abutting the pleura, unchanged. Again noted is the soft tissue density   pleural mass measuring 2.3 x 1.2 cm abutting the right lower lobe.   Bibasilar small segmental atelectasis.    LIVER: Within normal limits.  BILE DUCTS: Normal caliber.  GALLBLADDER: Collapsed gallbladder.  SPLEEN: Within normal limits.  PANCREAS: Redemonstrated 2 exophytic cysts at the tail of pancreas   largest one measuring 1.8 x 1.6 cm, unchanged.  ADRENALS: Within normal limits.  KIDNEYS/URETERS: Small nonobstructing punctate left intrarenal calculus.    BLADDER: Within normal limits.  REPRODUCTIVE ORGANS: Status post hysterectomy. No adnexal mass.    BOWEL: No bowel obstruction. Appendix difficult to visualized due to the   artifact caused by surgical devices in the pelvis. No signs of   appendicitis. Moderate stool throughout the colon. Redemonstrated rectal   wall thickening.  PERITONEUM: No ascites.  VESSELS:  Collapsed IVC. Moderate atherosclerosis of abdominal artery and   its branches.  RETROPERITONEUM: No lymphadenopathy.    ABDOMINAL WALL: Small fat-containing umbilical hernia.  BONES: Redemonstrated metallic plate and fixation screws stabilizing   pubic symphysis. Fixation screw extending from right ilium to sacrum.   Diffuse degenerative changes of spine.    IMPRESSION:     1. No bowel obstruction or inflammation.    2. Right lower lobe lung nodule and pleural soft tissue mass unchanged.    3. There are 2 exophyticpancreatic tail cysts unchanged; differentials   are IPMN or cystadenoma. Nonemergent MR scan can be performed.    < end of copied text > Chief Complaint:  Patient is a 67y old  Female who presents with a chief complaint of SOB and epigastric discomfort (23 Dec 2017 16:31)    HPI:  67 year old female with COPD, asthma, tracheobronchomalacia s/p tracheo-bronchoplasty (2016), Afib (on Eliquis), HTN, Adrenal Insufficiency (on chronic medrol), DM2, HTN, remote hx of DVT and 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 Patient denies abdominal pain, nausea, vomiting, changes in BMs, blood in stool or black colored stool.  She states that she was diagnosed with anal cancer in  and completed chemo in 2017 but has not had a colonoscopy after. She sometimes feels something is stuck in her anus.   She also has on an off epigastric and LUQ discomfort but denies pain.      Allergies:  ampicillin (Short breath)  aspirin (Short breath)  Avelox (Short breath)  codeine (Short breath)  Dilaudid (Short breath)  iodine (Short breath)  penicillin (Short breath)  shellfish (Anaphylaxis)  tetanus toxoid (Short breath)  Valium (Short breath)      Home Medications:   Incomplete Medication History as of 21-Dec-2017 22:41 documented in Structured Notes  · 	pregabalin 150 mg oral capsule: 1 cap(s) orally 2 times a day, Last Dose Taken:    · 	albuterol CFC free 90 mcg/inh inhalation aerosol: 1 puff(s) inhaled every 4 hours, As Needed - for shortness of breath and/or wheezing, Last Dose Taken:    · 	digoxin 250 mcg (0.25 mg) oral tablet: 1 tab(s) orally once a day, Last Dose Taken:    · 	pantoprazole 40 mg oral delayed release tablet: 1 tab(s) orally once a day (before a meal), Last Dose Taken:    · 	Lantus Solostar Pen 100 units/mL subcutaneous solution: 10 to 22 unit(s) subcutaneous once a day (at bedtime), Last Dose Taken:    · 	Spiriva 18 mcg inhalation capsule: 1 cap(s) inhaled once a day, Last Dose Taken:    · 	montelukast 10 mg oral tablet: 1 tab(s) orally once a day, Last Dose Taken:    · 	Breo Ellipta 200 mcg-25 mcg/inh inhalation powder: 1 puff(s) inhaled once a day, Last Dose Taken:    · 	Eliquis 5 mg oral tablet: 1 tab(s) orally 2 times a day, Last Dose Taken:    · 	Xyzal 5 mg oral tablet: 1 tab(s) orally once a day (in the evening), Last Dose Taken:    · 	Xolair 150 mg subcutaneous injection: subcutaneous every 2 weeks, Last Dose Taken:    · 	methylPREDNISolone 4 mg oral tablet: 1 tab(s) orally once a day, Last Dose Taken:    · 	Diovan 40 mg oral tablet: 1 tab(s) orally once a day, Last Dose Taken:    · 	Bactrim  mg-160 mg oral tablet: 1 tab(s) orally 2 times a day (for 10 days), Last Dose Taken:    · 	Vitamin D3 5000 intl units oral tablet: 1 tab(s) orally once a day, Last Dose Taken:    · 	Vitamin C 500 mg oral tablet: 1 tab(s) orally once a day, Last Dose Taken:    · 	Victoza 18 mg/3 mL subcutaneous solution: 1.8 milligram(s) subcutaneous once a day (in the morning), Last Dose Taken:    · 	NovoLOG 100 units/mL subcutaneous solution: about 7 unit(s) subcutaneous 3 times a day, Last Dose Taken:    · 	albuterol inhalation solution: inhaled 2 times a day, Last Dose Taken:    · 	mucomyst inhalation solution: inhaled 2 times a day, Last Dose Taken:     Hospital Medications:  ALBUTerol/ipratropium for Nebulization 3 milliLiter(s) Nebulizer every 6 hours  aluminum hydroxide/magnesium hydroxide/simethicone Suspension 30 milliLiter(s) Oral every 4 hours PRN  apixaban 5 milliGRAM(s) Oral every 12 hours  ascorbic acid 500 milliGRAM(s) Oral daily  buDESOnide 160 MICROgram(s)/formoterol 4.5 MICROgram(s) Inhaler 2 Puff(s) Inhalation two times a day  cholecalciferol 5000 Unit(s) Oral daily  dextrose 5%. 1000 milliLiter(s) IV Continuous <Continuous>  dextrose 50% Injectable 12.5 Gram(s) IV Push once  dextrose 50% Injectable 25 Gram(s) IV Push once  dextrose 50% Injectable 25 Gram(s) IV Push once  dextrose Gel 1 Dose(s) Oral once PRN  digoxin     Tablet 0.25 milliGRAM(s) Oral daily  famotidine    Tablet 20 milliGRAM(s) Oral daily  glucagon  Injectable 1 milliGRAM(s) IntraMuscular once PRN  insulin glargine Injectable (LANTUS) 16 Unit(s) SubCutaneous at bedtime  insulin lispro (HumaLOG) corrective regimen sliding scale   SubCutaneous three times a day before meals  insulin lispro Injectable (HumaLOG) 8 Unit(s) SubCutaneous three times a day before meals  metoclopramide 5 milliGRAM(s) Oral every 6 hours  montelukast 10 milliGRAM(s) Oral daily  ondansetron   Disintegrating Tablet 4 milliGRAM(s) Oral once  pantoprazole    Tablet 40 milliGRAM(s) Oral before breakfast  predniSONE   Tablet 40 milliGRAM(s) Oral daily  pregabalin 150 milliGRAM(s) Oral two times a day  sodium chloride 3%  Inhalation 4 milliLiter(s) Inhalation daily  tiotropium 18 MICROgram(s) Capsule 1 Capsule(s) Inhalation daily  trimethoprim  160 mG/sulfamethoxazole 800 mG 1 Tablet(s) Oral two times a day  valsartan 40 milliGRAM(s) Oral daily      PMHX/PSHX:  Aortic disease  Colorectal cancer  Tracheobronchomalacia  Hypertension  Asthma  Pelvic fracture  Aortic insufficiency  Adrenal insufficiency  COPD (chronic obstructive pulmonary disease)  Hypertension  Diabetes  Atrial fibrillation  H/O pelvic surgery  Exostosis of orbit, left  History of sinus surgery  H/O total knee replacement, bilateral  History of partial hysterectomy      Family history:  Family history of diabetes mellitus type II  Family history of breast cancer (Sibling)  Family history of asthma      Social History:   	Visiting daughter, originally from [place]  No EtOH smoking hx    ROS:     General:  No wt loss, fevers, chills, night sweats, fatigue,   Eyes:  Good vision, no reported pain  ENT:  No sore throat, pain, runny nose, dysphagia  CV:  No pain, palpitations, hypo/hypertension  Resp:  No dyspnea, cough, tachypnea, wheezing  GI:  See HPI  :  No pain, bleeding, incontinence, nocturia  Muscle:  No pain, weakness  Neuro:  No weakness, tingling, memory problems  Psych:  No fatigue, insomnia, mood problems, depression  Endocrine:  No polyuria, polydipsia, cold/heat intolerance  Heme:  No petechiae, ecchymosis, easy bruisability  Skin:  No rash, edema      PHYSICAL EXAM:     GENERAL:  Appears stated age, well-groomed, well-nourished, no distress  HEENT:  NC/AT,  conjunctivae clear and pink,  no JVD  CHEST:  Full & symmetric excursion, no increased effort, breath sounds clear  HEART:  Regular rhythm, S1, S2, no murmur/rub/S3/S4, no abdominal bruit, no edema  ABDOMEN:  Soft, non-tender, non-distended, normoactive bowel sounds,  no masses ,  EXTREMITIES:  no cyanosis,clubbing or edema  SKIN:  No rash/erythema/ecchymoses/petechiae/wounds/abscess/warm/dry  NEURO:  Alert, oriented    Vital Signs:  Vital Signs Last 24 Hrs  T(C): 36.8 (23 Dec 2017 14:11), Max: 36.8 (23 Dec 2017 14:11)  T(F): 98.2 (23 Dec 2017 14:11), Max: 98.2 (23 Dec 2017 14:11)  HR: 93 (23 Dec 2017 14:11) (63 - 658)  BP: 133/68 (23 Dec 2017 14:11) (118/64 - 133/68)  BP(mean): --  RR: 18 (23 Dec 2017 14:11) (18 - 18)  SpO2: 97% (23 Dec 2017 14:11) (95% - 97%)  Daily     Daily     LABS:        136  |  98  |  18  ----------------------------<  313<H>  5.1   |  26  |  0.92    Ca    9.4      22 Dec 2017 03:38    TPro  7.2  /  Alb  3.8  /  TBili  0.2  /  DBili  x   /  AST  10  /  ALT  12  /  AlkPhos  106  12-22    LIVER FUNCTIONS - ( 22 Dec 2017 03:38 )  Alb: 3.8 g/dL / Pro: 7.2 g/dL / ALK PHOS: 106 U/L / ALT: 12 U/L RC / AST: 10 U/L / GGT: x             Urinalysis Basic - ( 22 Dec 2017 02:45 )    Color: PL Yellow / Appearance: Clear / S.028 / pH: x  Gluc: x / Ketone: Trace  / Bili: Negative / Urobili: Negative   Blood: x / Protein: Negative / Nitrite: Negative   Leuk Esterase: Negative / RBC: x / WBC x   Sq Epi: x / Non Sq Epi: x / Bacteria: x          Imaging:      < from: CT Abdomen and Pelvis w/ Oral Cont (17 @ 09:33) >  FINDINGS:    LOWER CHEST: Redemonstrated right lower lobe peripheral nodular opacity   abutting the pleura, unchanged. Again noted is the soft tissue density   pleural mass measuring 2.3 x 1.2 cm abutting the right lower lobe.   Bibasilar small segmental atelectasis.    LIVER: Within normal limits.  BILE DUCTS: Normal caliber.  GALLBLADDER: Collapsed gallbladder.  SPLEEN: Within normal limits.  PANCREAS: Redemonstrated 2 exophytic cysts at the tail of pancreas   largest one measuring 1.8 x 1.6 cm, unchanged.  ADRENALS: Within normal limits.  KIDNEYS/URETERS: Small nonobstructing punctate left intrarenal calculus.    BLADDER: Within normal limits.  REPRODUCTIVE ORGANS: Status post hysterectomy. No adnexal mass.    BOWEL: No bowel obstruction. Appendix difficult to visualized due to the   artifact caused by surgical devices in the pelvis. No signs of   appendicitis. Moderate stool throughout the colon. Redemonstrated rectal   wall thickening.  PERITONEUM: No ascites.  VESSELS:  Collapsed IVC. Moderate atherosclerosis of abdominal artery and   its branches.  RETROPERITONEUM: No lymphadenopathy.    ABDOMINAL WALL: Small fat-containing umbilical hernia.  BONES: Redemonstrated metallic plate and fixation screws stabilizing   pubic symphysis. Fixation screw extending from right ilium to sacrum.   Diffuse degenerative changes of spine.    IMPRESSION:     1. No bowel obstruction or inflammation.    2. Right lower lobe lung nodule and pleural soft tissue mass unchanged.    3. There are 2 exophyticpancreatic tail cysts unchanged; differentials   are IPMN or cystadenoma. Nonemergent MR scan can be performed.    < end of copied text >

## 2017-12-23 NOTE — PROGRESS NOTE ADULT - PROBLEM SELECTOR PLAN 2
-given patient's CT findings of a pancreatic lesion that was seen in a prior CT in 2016, may need MRI OP   -Diabetic gastroparesis also a possibility; trial of Reglan  -f/u CA 19-9 and CEA  -c/w Pepcid and maalox  -f/u H. Pylori studies -given patient's CT findings of a pancreatic lesion that was seen in a prior CT in 2016, may need MRI OP   -Diabetic gastroparesis also a possibility; trial of Reglan  -f/u CA 19-9 and CEA  -c/w Pepcid and maalox  -H. Pylori IgG neg, IgA postive, GI consulted for further recs

## 2017-12-23 NOTE — PROGRESS NOTE ADULT - ASSESSMENT
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

## 2017-12-23 NOTE — DISCHARGE NOTE ADULT - MEDICATION SUMMARY - MEDICATIONS TO STOP TAKING
I will STOP taking the medications listed below when I get home from the hospital:    Bactrim  mg-160 mg oral tablet  -- 1 tab(s) by mouth 2 times a day (for 10 days)

## 2017-12-23 NOTE — DISCHARGE NOTE ADULT - PATIENT PORTAL LINK FT
“You can access the Follow[facility] Patient Portal, offered by [facility], by registering with the following website: [email or web address]”

## 2017-12-23 NOTE — DISCHARGE NOTE ADULT - CARE PROVIDER_API CALL
Eulalio Allison), Internal Medicine; Pulmonary Disease  [address] [facility]  [address]  [place] [address]  Phone: [phone]  Fax: [phone]    Zach King), Hematology; Internal Medicine; Medical Oncology  [address] [place] [address]  Phone: [phone]  Fax: [phone]    Zohaib Zapien), Surgery; Thoracic Surgery  [address]  [address] [place] [address]  Phone: [phone]  Fax: [phone] Eulalio Allison), Internal Medicine; Pulmonary Disease  [address] [facility]  [address]  [place] [address]  Phone: [phone]  Fax: [phone]    Zach King), Hematology; Internal Medicine; Medical Oncology  [address] [place] [address]  Phone: [phone]  Fax: [phone]    Zohaib Zapien), Surgery; Thoracic Surgery  [address]  [address] [place] [address]  Phone: [phone]  Fax: [phone]    Chava Hall), Gastroenterology; Internal Medicine  [address] [facility]  [address]  [place] [address]  Phone: [phone]  Fax: [phone]

## 2017-12-23 NOTE — DISCHARGE NOTE ADULT - HOSPITAL COURSE
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 Pulmonology was consulted and believed this was not related to the lungs, they wanted to continue her tx of bactrim for a positive sputum culture. EKG was negative as well as negative cardiac enzymes. Our thought was this may have been diabetic gastroparesis. Her H. Pylori IgG was negative, and her IgA was positive. GI was consulted and they suggested...... 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 Pulmonology was consulted and believed this was not related to the lungs, they wanted to continue her tx of bactrim for a positive sputum culture. EKG was negative as well as negative cardiac enzymes. Our thought was this may have been diabetic gastroparesis. Her H. Pylori IgG was negative, and her IgA was positive. GI was consulted and the pt subsequently received an EGD which revealed plaques in her esophagus, which were biopsied. Pt completed her course of Bactrim prior to her discharge. Pts epigastric pain continued to improve, and the pt was discharged to home in stable condition. 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 Pulmonology was consulted and believed this was not related to the lungs, they wanted to continue her tx of bactrim for a positive sputum culture. EKG was negative as well as negative cardiac enzymes. Our thought was this may have been diabetic gastroparesis. Her H. Pylori IgG was negative, and her IgA was positive. GI was consulted and the pt subsequently received an EGD which revealed plaques in her esophagus, which were biopsied. Pt completed her course of Bactrim prior to her discharge.  Pt was found to have some oral thrush and was prescribed a total 14 day course of Diflucan, which she was instructed to complete at home. Pts epigastric pain continued to improve, and the pt was discharged to home in stable condition. 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 Pulmonology was consulted and believed this was not related to the lungs, they wanted to continue her tx of bactrim for a positive sputum culture. EKG was negative as well as negative cardiac enzymes. Our thought was this may have been diabetic gastroparesis. Her H. Pylori IgG was negative, and her IgA was positive. GI was consulted and the pt subsequently received an EGD which revealed plaques in her esophagus, which were biopsied. Pt completed her course of Bactrim prior to her discharge.  Pt was found to have some oral thrush and was prescribed a total 14 day course of Diflucan, which she was instructed to complete at home. Pts epigastric pain continued to improve, and the pt was discharged to home in stable condition.    Discharge time spent: 34 min

## 2017-12-23 NOTE — CONSULT NOTE ADULT - ATTENDING COMMENTS
Upper abdominal pain can not rule out PUD  Ave risk colon cancer screening  EGD colonoscopy Tuesday.  Case discussed with hospitalist

## 2017-12-23 NOTE — DISCHARGE NOTE ADULT - PROVIDER TOKENS
TOKEN:'[id number]:'[id number]' TOKEN:'[id number]:'[id number]:'[id number]'

## 2017-12-23 NOTE — PROGRESS NOTE ADULT - PROBLEM SELECTOR PLAN 3
-CT chest notes pulmonary HTN which could contribute to SOB.  -TTE ordered to further evaluate -CT chest notes pulmonary HTN which could be cause of dyspnea

## 2017-12-23 NOTE — PROGRESS NOTE ADULT - SUBJECTIVE AND OBJECTIVE BOX
Pulkaty f/u on behalf of Dr. Allison    CHIEF COMPLAINT: SOB with eating    Interval Events:  no o/n events    REVIEW OF SYSTEMS:  Constitutional: [ ] fevers [ ] chills [ ] weight loss [ ] weight gain  CV: [ ] chest pain [ ] orthopnea [ ] palpitations [ ] murmur  Resp: [ ] cough [ ] shortness of breath [ ] dyspnea [ ] wheezing [ ] sputum [ ] hemoptysis  GI: [ ] nausea [ ] vomiting [ ] diarrhea [ ] constipation [ ] abd pain [ ] dysphagia   Endocrine: [ +] diabetes [ ] thyroid problem  [x ] All other systems negative  [ ] Unable to assess ROS because ________    OBJECTIVE:  ICU Vital Signs Last 24 Hrs  T(C): 36.6 (23 Dec 2017 04:33), Max: 36.6 (22 Dec 2017 21:58)  T(F): 97.9 (23 Dec 2017 04:33), Max: 97.9 (23 Dec 2017 04:33)  HR: 66 (23 Dec 2017 05:33) (63 - 658)  BP: 118/64 (23 Dec 2017 04:33) (118/64 - 122/67)  BP(mean): --  ABP: --  ABP(mean): --  RR: 18 (23 Dec 2017 04:33) (18 - 18)  SpO2: 96% (23 Dec 2017 05:33) (94% - 98%)        CAPILLARY BLOOD GLUCOSE      POCT Blood Glucose.: 203 mg/dL (23 Dec 2017 08:38)      PHYSICAL EXAM:  General:   HEENT:   Lymph Nodes:  Neck:   Respiratory:   Cardiovascular:   Abdomen:   Extremities:   Skin:   Neurological:  Psychiatry:    HOSPITAL MEDICATIONS:  MEDICATIONS  (STANDING):  ALBUTerol/ipratropium for Nebulization 3 milliLiter(s) Nebulizer every 6 hours  apixaban 5 milliGRAM(s) Oral every 12 hours  ascorbic acid 500 milliGRAM(s) Oral daily  cholecalciferol 5000 Unit(s) Oral daily  dextrose 5%. 1000 milliLiter(s) (50 mL/Hr) IV Continuous <Continuous>  dextrose 50% Injectable 12.5 Gram(s) IV Push once  dextrose 50% Injectable 25 Gram(s) IV Push once  dextrose 50% Injectable 25 Gram(s) IV Push once  digoxin     Tablet 0.25 milliGRAM(s) Oral daily  famotidine    Tablet 20 milliGRAM(s) Oral daily  insulin glargine Injectable (LANTUS) 12 Unit(s) SubCutaneous at bedtime  insulin lispro (HumaLOG) corrective regimen sliding scale   SubCutaneous three times a day before meals  insulin lispro Injectable (HumaLOG) 6 Unit(s) SubCutaneous three times a day before meals  metoclopramide 5 milliGRAM(s) Oral every 6 hours  montelukast 10 milliGRAM(s) Oral daily  ondansetron   Disintegrating Tablet 4 milliGRAM(s) Oral once  pantoprazole    Tablet 40 milliGRAM(s) Oral before breakfast  predniSONE   Tablet 40 milliGRAM(s) Oral daily  pregabalin 150 milliGRAM(s) Oral two times a day  trimethoprim  160 mG/sulfamethoxazole 800 mG 1 Tablet(s) Oral two times a day  valsartan 40 milliGRAM(s) Oral daily    MEDICATIONS  (PRN):  aluminum hydroxide/magnesium hydroxide/simethicone Suspension 30 milliLiter(s) Oral every 4 hours PRN Dyspepsia  dextrose Gel 1 Dose(s) Oral once PRN Blood Glucose LESS THAN 70 milliGRAM(s)/deciliter  glucagon  Injectable 1 milliGRAM(s) IntraMuscular once PRN Glucose LESS THAN 70 milligrams/deciliter      LABS:                        14.0   7.9   )-----------( 301      ( 21 Dec 2017 13:27 )             43.7     Hgb Trend: 14.0<--  12-22    136  |  98  |  18  ----------------------------<  313<H>  5.1   |  26  |  0.92    Ca    9.4      22 Dec 2017 03:38    TPro  7.2  /  Alb  3.8  /  TBili  0.2  /  DBili  x   /  AST  10  /  ALT  12  /  AlkPhos  106  12-22    Creatinine Trend: 0.92<--, 0.88<--  PT/INR - ( 21 Dec 2017 13:27 )   PT: 14.0 sec;   INR: 1.29 ratio         PTT - ( 21 Dec 2017 13:27 )  PTT:39.2 sec  Urinalysis Basic - ( 22 Dec 2017 02:45 )    Color: PL Yellow / Appearance: Clear / S.028 / pH: x  Gluc: x / Ketone: Trace  / Bili: Negative / Urobili: Negative   Blood: x / Protein: Negative / Nitrite: Negative   Leuk Esterase: Negative / RBC: x / WBC x   Sq Epi: x / Non Sq Epi: x / Bacteria: x Pulm f/u on behalf of Dr. Allison    CHIEF COMPLAINT: SOB with eating    Interval Events:  no o/n events  feeling better this morning    REVIEW OF SYSTEMS:  Constitutional: [- ] fevers [ -] chills [ ] weight loss [ ] weight gain  CV: [- ] chest pain [ ] orthopnea [ ] palpitations [ ] murmur  Resp: [+ ] cough [- ] shortness of breath [- ] dyspnea [- ] wheezing [- ] sputum [ ] hemoptysis  GI: [ -] nausea [- ] vomiting [ -] diarrhea [ ] constipation [ -] abd pain [ ] dysphagia   Endocrine: [ +] diabetes [ ] thyroid problem  [x ] All other systems negative  [ ] Unable to assess ROS because ________    OBJECTIVE:  ICU Vital Signs Last 24 Hrs  T(C): 36.6 (23 Dec 2017 04:33), Max: 36.6 (22 Dec 2017 21:58)  T(F): 97.9 (23 Dec 2017 04:33), Max: 97.9 (23 Dec 2017 04:33)  HR: 66 (23 Dec 2017 05:33) (63 - 658)  BP: 118/64 (23 Dec 2017 04:33) (118/64 - 122/67)  BP(mean): --  ABP: --  ABP(mean): --  RR: 18 (23 Dec 2017 04:33) (18 - 18)  SpO2: 96% (23 Dec 2017 05:33) (94% - 98%)        CAPILLARY BLOOD GLUCOSE      POCT Blood Glucose.: 203 mg/dL (23 Dec 2017 08:38)      PHYSICAL EXAM:  General: NAD, non toxic appearing, speaking in full sentences  HEENT: MMM, EOMI  Lymph Nodes: no cervical LAD  Neck: supple  Respiratory: diffuse expiratory rhonchi, good air movement  Cardiovascular: s1s2 RRR no murmurs  Abdomen: soft, non tender, non distended, +BS  Extremities: warm, no edema, no clubbing  Skin: intact, no rashes  Neurological: non focal  Psychiatry: AAOx3    HOSPITAL MEDICATIONS:  MEDICATIONS  (STANDING):  ALBUTerol/ipratropium for Nebulization 3 milliLiter(s) Nebulizer every 6 hours  apixaban 5 milliGRAM(s) Oral every 12 hours  ascorbic acid 500 milliGRAM(s) Oral daily  cholecalciferol 5000 Unit(s) Oral daily  dextrose 5%. 1000 milliLiter(s) (50 mL/Hr) IV Continuous <Continuous>  dextrose 50% Injectable 12.5 Gram(s) IV Push once  dextrose 50% Injectable 25 Gram(s) IV Push once  dextrose 50% Injectable 25 Gram(s) IV Push once  digoxin     Tablet 0.25 milliGRAM(s) Oral daily  famotidine    Tablet 20 milliGRAM(s) Oral daily  insulin glargine Injectable (LANTUS) 12 Unit(s) SubCutaneous at bedtime  insulin lispro (HumaLOG) corrective regimen sliding scale   SubCutaneous three times a day before meals  insulin lispro Injectable (HumaLOG) 6 Unit(s) SubCutaneous three times a day before meals  metoclopramide 5 milliGRAM(s) Oral every 6 hours  montelukast 10 milliGRAM(s) Oral daily  ondansetron   Disintegrating Tablet 4 milliGRAM(s) Oral once  pantoprazole    Tablet 40 milliGRAM(s) Oral before breakfast  predniSONE   Tablet 40 milliGRAM(s) Oral daily  pregabalin 150 milliGRAM(s) Oral two times a day  trimethoprim  160 mG/sulfamethoxazole 800 mG 1 Tablet(s) Oral two times a day  valsartan 40 milliGRAM(s) Oral daily    MEDICATIONS  (PRN):  aluminum hydroxide/magnesium hydroxide/simethicone Suspension 30 milliLiter(s) Oral every 4 hours PRN Dyspepsia  dextrose Gel 1 Dose(s) Oral once PRN Blood Glucose LESS THAN 70 milliGRAM(s)/deciliter  glucagon  Injectable 1 milliGRAM(s) IntraMuscular once PRN Glucose LESS THAN 70 milligrams/deciliter      LABS:                        14.0   7.9   )-----------( 301      ( 21 Dec 2017 13:27 )             43.7     Hgb Trend: 14.0<--  12-22    136  |  98  |  18  ----------------------------<  313<H>  5.1   |  26  |  0.92    Ca    9.4      22 Dec 2017 03:38    TPro  7.2  /  Alb  3.8  /  TBili  0.2  /  DBili  x   /  AST  10  /  ALT  12  /  AlkPhos  106  12-22    Creatinine Trend: 0.92<--, 0.88<--  PT/INR - ( 21 Dec 2017 13:27 )   PT: 14.0 sec;   INR: 1.29 ratio         PTT - ( 21 Dec 2017 13:27 )  PTT:39.2 sec  Urinalysis Basic - ( 22 Dec 2017 02:45 )    Color: PL Yellow / Appearance: Clear / S.028 / pH: x  Gluc: x / Ketone: Trace  / Bili: Negative / Urobili: Negative   Blood: x / Protein: Negative / Nitrite: Negative   Leuk Esterase: Negative / RBC: x / WBC x   Sq Epi: x / Non Sq Epi: x / Bacteria: x

## 2017-12-23 NOTE — PROGRESS NOTE ADULT - PROBLEM SELECTOR PLAN 1
- Unclear if this is actually a COPD exacerbation. Patient is not truly hypoxic, and CT Chest does not show acute disease; and improved significantly while on the floor  - Continue Duonebs, Prednisone 40 3/5 days  - Patient completed a sputum culture with her OP pulmonologist and it was positive for group B strep now requiring Bactrim for 10 days. Patient on day 5.  -D-Dimer low - Unclear if this is actually a COPD exacerbation. Patient is not truly hypoxic, and CT Chest does not show acute disease; and improved significantly while on the floor  - Continue Duonebs, Prednisone 40 3/5 days  - Patient completed a sputum culture with her OP pulmonologist and it was positive for group B strep now requiring Bactrim for 10 days. Patient on day 5.

## 2017-12-23 NOTE — PROGRESS NOTE ADULT - ASSESSMENT
67 yr F with PMH of COPD/asthma, tracheobronchomalacia s/p tracheo-bronchoplasty in 10/16, Afib on Eliquis , Adrenal Insufficieny on steroids,  DM2, HTN, Squamous cell CA of the anus on chemo/XRT, now admitted with transient episode of shortness of breath that occurred after eating.    1) SOB  -   - symptoms may have been triggered by gastroparesis as her symptoms occurred when she ate   - CT chest does show some mild tree in bud likely due to mucus plugging and combined with increased cough and change in sputum color it is reasonable to treat for tracheobronchitis - pt is currently on bactrim and should complete a 7 day course  - add nebulized 3% hypertonic saline (to be given after duonebs) to help with clearance  - Chest PT, incentive spirometry, OOB as tolerated   - keep O2 sats >92%  - check O2 sat on RA with ambulation     2) Asthma  - does not appear to have an asthma exacerbation at this time   - can decrease steroids to home dose of medrol  - please restart spiriva and symbicort bid 67 yr F with PMH of COPD/asthma, tracheobronchomalacia s/p tracheo-bronchoplasty in 10/16, Afib on Eliquis , Adrenal Insufficieny on steroids,  DM2, HTN, Squamous cell CA of the anus on chemo/XRT, now admitted with transient episode of shortness of breath that occurred after eating.    1) SOB  - feeling ok today, no further episodes of SOB   - symptoms may have been triggered by gastroparesis as her symptoms occurred when she ate- recommend GI eval   - CT chest does show some mild tree in bud likely due to mucus plugging and combined with increased cough and change in sputum color it is reasonable to treat for tracheobronchitis - pt is currently on bactrim and should complete a 7 day course  - add nebulized 3% hypertonic saline (to be given after duonebs) to help with clearance  - Chest PT, incentive spirometry, OOB as tolerated   - keep O2 sats >92%  - check O2 sat on RA with ambulation     2) Asthma  - does not appear to have an asthma exacerbation at this time   - can decrease steroids to home dose of medrol  - please restart spiriva and symbicort bid

## 2017-12-23 NOTE — PROGRESS NOTE ADULT - SUBJECTIVE AND OBJECTIVE BOX
Patient is a 67y old  Female who presents with a chief complaint of SOB and epigastric discomfort (22 Dec 2017 01:01)      SUBJECTIVE / OVERNIGHT EVENTS:  Patient denies chest pain, SOB, palpitations, abdominal pain, nausea, vomiting, chills, and cough    MEDICATIONS  (STANDING):  ALBUTerol/ipratropium for Nebulization 3 milliLiter(s) Nebulizer every 6 hours  apixaban 5 milliGRAM(s) Oral every 12 hours  ascorbic acid 500 milliGRAM(s) Oral daily  cholecalciferol 5000 Unit(s) Oral daily  dextrose 5%. 1000 milliLiter(s) (50 mL/Hr) IV Continuous <Continuous>  dextrose 50% Injectable 12.5 Gram(s) IV Push once  dextrose 50% Injectable 25 Gram(s) IV Push once  dextrose 50% Injectable 25 Gram(s) IV Push once  digoxin     Tablet 0.25 milliGRAM(s) Oral daily  famotidine    Tablet 20 milliGRAM(s) Oral daily  insulin glargine Injectable (LANTUS) 12 Unit(s) SubCutaneous at bedtime  insulin lispro (HumaLOG) corrective regimen sliding scale   SubCutaneous three times a day before meals  insulin lispro Injectable (HumaLOG) 6 Unit(s) SubCutaneous three times a day before meals  metoclopramide 5 milliGRAM(s) Oral every 6 hours  montelukast 10 milliGRAM(s) Oral daily  ondansetron   Disintegrating Tablet 4 milliGRAM(s) Oral once  pantoprazole    Tablet 40 milliGRAM(s) Oral before breakfast  predniSONE   Tablet 40 milliGRAM(s) Oral daily  pregabalin 150 milliGRAM(s) Oral two times a day  trimethoprim  160 mG/sulfamethoxazole 800 mG 1 Tablet(s) Oral two times a day  valsartan 40 milliGRAM(s) Oral daily    MEDICATIONS  (PRN):  aluminum hydroxide/magnesium hydroxide/simethicone Suspension 30 milliLiter(s) Oral every 4 hours PRN Dyspepsia  dextrose Gel 1 Dose(s) Oral once PRN Blood Glucose LESS THAN 70 milliGRAM(s)/deciliter  glucagon  Injectable 1 milliGRAM(s) IntraMuscular once PRN Glucose LESS THAN 70 milligrams/deciliter      T(F): 97.9 ( @ 04:33), Max: 97.9 ( @ 04:33)  HR: 66 ( @ 05:33) (63 - 658)  BP: 118/64 ( @ 04:33) (118/64 - 122/67)  RR: 18 ( @ 04:33) (18 - 18)  SpO2: 96% ( @ 05:33) (94% - 98%)  CAPILLARY BLOOD GLUCOSE      POCT Blood Glucose.: 260 mg/dL (22 Dec 2017 22:16)  POCT Blood Glucose.: 192 mg/dL (22 Dec 2017 18:02)  POCT Blood Glucose.: 283 mg/dL (22 Dec 2017 12:47)  POCT Blood Glucose.: 225 mg/dL (22 Dec 2017 08:41)    I&O's Summary      PHYSICAL EXAM:  GEN: Appears in no acute distress  HEENT: PERRLA, EOMI and accommodate, neck supple, no lymphadenopathy, no JVD  MOUTH: moist mucous membranes, no exudates or lesions   PULM: Clear to auscultation bilaterally, no wheezes, rales, rhonchi  CV: RRR, S1S2, no murmurs, rubs or gallops  Abdominal: Soft, nontender to palpation, non-distended, normoactive bowel sounds  Extremities: WWP, No edema, + peripheral pulses  NEURO: AAOx3, moving all four extremities spontaneously  Skin: No rashes, lesions, hematomas, ecchymosis      LABS:  Labs personally reviewed.                        14.0   7.9   )-----------( 301      ( 21 Dec 2017 13:27 )             43.7     Hgb Trend: 14.0<--  12-22    136  |  98  |  18  ----------------------------<  313<H>  5.1   |  26  |  0.92    Ca    9.4      22 Dec 2017 03:38    TPro  7.2  /  Alb  3.8  /  TBili  0.2  /  DBili  x   /  AST  10  /  ALT  12  /  AlkPhos  106  12    Creatinine Trend: 0.92<--, 0.88<--  PT/INR - ( 21 Dec 2017 13:27 )   PT: 14.0 sec;   INR: 1.29 ratio         PTT - ( 21 Dec 2017 13:27 )  PTT:39.2 sec  Urinalysis Basic - ( 22 Dec 2017 02:45 )    Color: PL Yellow / Appearance: Clear / S.028 / pH: x  Gluc: x / Ketone: Trace  / Bili: Negative / Urobili: Negative   Blood: x / Protein: Negative / Nitrite: Negative   Leuk Esterase: Negative / RBC: x / WBC x   Sq Epi: x / Non Sq Epi: x / Bacteria: x        RADIOLOGY & ADDITIONAL TESTS:  Imaging Personally Reviewed.    Consultants:      Dejuan Erickson PGY-1 Pager: CASIE [phone]/ [phone]  Night Float: CASIE [id number]/ NS Patient is a 67y old  Female who presents with a chief complaint of SOB and epigastric discomfort (22 Dec 2017 01:01)      SUBJECTIVE / OVERNIGHT EVENTS: No overnight events. Patient tolerating PO. Continues to have improvement  Patient denies chest pain, SOB, palpitations, abdominal pain, nausea, vomiting, chills, and cough    MEDICATIONS  (STANDING):  ALBUTerol/ipratropium for Nebulization 3 milliLiter(s) Nebulizer every 6 hours  apixaban 5 milliGRAM(s) Oral every 12 hours  ascorbic acid 500 milliGRAM(s) Oral daily  cholecalciferol 5000 Unit(s) Oral daily  dextrose 5%. 1000 milliLiter(s) (50 mL/Hr) IV Continuous <Continuous>  dextrose 50% Injectable 12.5 Gram(s) IV Push once  dextrose 50% Injectable 25 Gram(s) IV Push once  dextrose 50% Injectable 25 Gram(s) IV Push once  digoxin     Tablet 0.25 milliGRAM(s) Oral daily  famotidine    Tablet 20 milliGRAM(s) Oral daily  insulin glargine Injectable (LANTUS) 12 Unit(s) SubCutaneous at bedtime  insulin lispro (HumaLOG) corrective regimen sliding scale   SubCutaneous three times a day before meals  insulin lispro Injectable (HumaLOG) 6 Unit(s) SubCutaneous three times a day before meals  metoclopramide 5 milliGRAM(s) Oral every 6 hours  montelukast 10 milliGRAM(s) Oral daily  ondansetron   Disintegrating Tablet 4 milliGRAM(s) Oral once  pantoprazole    Tablet 40 milliGRAM(s) Oral before breakfast  predniSONE   Tablet 40 milliGRAM(s) Oral daily  pregabalin 150 milliGRAM(s) Oral two times a day  trimethoprim  160 mG/sulfamethoxazole 800 mG 1 Tablet(s) Oral two times a day  valsartan 40 milliGRAM(s) Oral daily    MEDICATIONS  (PRN):  aluminum hydroxide/magnesium hydroxide/simethicone Suspension 30 milliLiter(s) Oral every 4 hours PRN Dyspepsia  dextrose Gel 1 Dose(s) Oral once PRN Blood Glucose LESS THAN 70 milliGRAM(s)/deciliter  glucagon  Injectable 1 milliGRAM(s) IntraMuscular once PRN Glucose LESS THAN 70 milligrams/deciliter      T(F): 97.9 ( @ 04:33), Max: 97.9 ( @ 04:33)  HR: 66 ( @ 05:33) (63 - 658)  BP: 118/64 ( @ 04:33) (118/64 - 122/67)  RR: 18 ( @ 04:33) (18 - 18)  SpO2: 96% ( @ 05:33) (94% - 98%)  CAPILLARY BLOOD GLUCOSE      POCT Blood Glucose.: 260 mg/dL (22 Dec 2017 22:16)  POCT Blood Glucose.: 192 mg/dL (22 Dec 2017 18:02)  POCT Blood Glucose.: 283 mg/dL (22 Dec 2017 12:47)  POCT Blood Glucose.: 225 mg/dL (22 Dec 2017 08:41)    I&O's Summary      PHYSICAL EXAM:  GEN: Appears in no acute distress  PULM: Clear to auscultation bilaterally, no wheezes, rales, rhonchi  CV: RRR, S1S2, no murmurs, rubs or gallops  Abdominal: Soft, nontender to palpation, non-distended, normoactive bowel sounds  Extremities: WWP, No edema, + peripheral pulses  NEURO: AAOx3, moving all four extremities spontaneously      LABS:  Labs personally reviewed.                        14.0   7.9   )-----------( 301      ( 21 Dec 2017 13:27 )             43.7     Hgb Trend: 14.0<--  12-22    136  |  98  |  18  ----------------------------<  313<H>  5.1   |  26  |  0.92    Ca    9.4      22 Dec 2017 03:38    TPro  7.2  /  Alb  3.8  /  TBili  0.2  /  DBili  x   /  AST  10  /  ALT  12  /  AlkPhos  106  12-22    Creatinine Trend: 0.92<--, 0.88<--  PT/INR - ( 21 Dec 2017 13:27 )   PT: 14.0 sec;   INR: 1.29 ratio         PTT - ( 21 Dec 2017 13:27 )  PTT:39.2 sec  Urinalysis Basic - ( 22 Dec 2017 02:45 )    Color: PL Yellow / Appearance: Clear / S.028 / pH: x  Gluc: x / Ketone: Trace  / Bili: Negative / Urobili: Negative   Blood: x / Protein: Negative / Nitrite: Negative   Leuk Esterase: Negative / RBC: x / WBC x   Sq Epi: x / Non Sq Epi: x / Bacteria: x        RADIOLOGY & ADDITIONAL TESTS:  Imaging Personally Reviewed.    Consultants: PulmonarySHAINA PGY-1 Pager: CASIE [phone]/ [phone]  Night Float: CASIE [id number]/ NS

## 2017-12-23 NOTE — PROGRESS NOTE ADULT - ATTENDING COMMENTS
Yanira Whitaker MD  Division of Hospital Medicine  Pager: [phone]  Office: [phone] Pt very anxious about her epigastric pain, insisting on GI consultation especially given that it is holiday season and it would be almost impossible for her to see any GI as outpatient until end of Jan. In setting of positive H.pylori IgA and epigastric pain, could be underlying PUD? Will call GI for recommendation    Yanira Whitaker MD  Division of Hospital Medicine  Pager: [phone]  Office: [phone]

## 2017-12-23 NOTE — CONSULT NOTE ADULT - ASSESSMENT
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

## 2017-12-23 NOTE — DISCHARGE NOTE ADULT - CARE PROVIDERS DIRECT ADDRESSES
,[email or web address] ,[email or web address] [email or web address] [email or web address]

## 2017-12-23 NOTE — PROGRESS NOTE ADULT - PROBLEM SELECTOR PLAN 6
-Not controlled  -Adding Humalog 6 U  -C/W Lantus 12 QHS  -Will continue to monitor  -FS TID and at bedtime  -ISS  CC DASH diet -Not controlled  -Increasing Humalog to 8 U  -will increase Lantus to 16 QHS  -Will continue to monitor  -FS TID and at bedtime  -ISS  CC DASH diet

## 2017-12-23 NOTE — DISCHARGE NOTE ADULT - MEDICATION SUMMARY - MEDICATIONS TO TAKE
I will START or STAY ON the medications listed below when I get home from the hospital:    calcium oral tablet  -- As needed  -- Indication: For Health Maintenance    methylPREDNISolone 4 mg oral tablet  -- 1 tab(s) by mouth once a day  -- Indication: For Adrenal insufficiency    Diovan 40 mg oral tablet  -- 1 tab(s) by mouth once a day  -- Indication: For Essential hypertension    aluminum hydroxide-magnesium hydroxide 200 mg-200 mg/5 mL oral suspension  -- 30 milliliter(s) by mouth every 4 hours, As needed, Dyspepsia  -- Indication: For Dyspepsia    digoxin 250 mcg (0.25 mg) oral tablet  -- 1 tab(s) by mouth once a day  -- Indication: For Paroxysmal atrial fibrillation    Eliquis 5 mg oral tablet  -- 1 tab(s) by mouth 2 times a day  -- Indication: For Paroxysmal atrial fibrillation    pregabalin 150 mg oral capsule  -- 1 cap(s) by mouth 2 times a day  -- Indication: For Type 2 diabetes mellitus without complication, with long-term current use of insulin    Lantus Solostar Pen 100 units/mL subcutaneous solution  -- 10 to 22 unit(s) subcutaneous once a day (at bedtime)  -- Do not drink alcoholic beverages when taking this medication.  It is very important that you take or use this exactly as directed.  Do not skip doses or discontinue unless directed by your doctor.  Keep in refrigerator.  Do not freeze.    -- Indication: For Type 2 diabetes mellitus without complication, with long-term current use of insulin    Victoza 18 mg/3 mL subcutaneous solution  -- 1.8 milligram(s) subcutaneous once a day (in the morning)  -- Indication: For Type 2 diabetes mellitus without complication, with long-term current use of insulin    NovoLOG 100 units/mL subcutaneous solution  -- about 7 unit(s) subcutaneous 3 times a day  -- Indication: For Type 2 diabetes mellitus without complication, with long-term current use of insulin    Mucomyst-20 inhalation solution  -- inhaled 2 times a day  -- Indication: For Chronic obstructive pulmonary disease with acute exacerbation    fluconazole 200 mg oral tablet  -- 1 tab(s) by mouth once a day  -- Indication: For Chronic obstructive pulmonary disease with acute exacerbation    Xyzal 5 mg oral tablet  -- 1 tab(s) by mouth once a day (in the evening), As Needed  -- Indication: For Chronic obstructive pulmonary disease with acute exacerbation    Spiriva 18 mcg inhalation capsule  -- 1 cap(s) inhaled once a day  -- Indication: For Chronic obstructive pulmonary disease with acute exacerbation    albuterol CFC free 90 mcg/inh inhalation aerosol  -- 1 puff(s) inhaled every 4 hours, As Needed - for shortness of breath and/or wheezing  -- Indication: For Chronic obstructive pulmonary disease with acute exacerbation    Breo Ellipta 200 mcg-25 mcg/inh inhalation powder  -- 1 puff(s) inhaled once a day  -- Indication: For Chronic obstructive pulmonary disease with acute exacerbation    albuterol 2.5 mg/3 mL (0.083%) inhalation solution  -- 3 milliliter(s) inhaled 2 times a day  -- Indication: For Chronic obstructive pulmonary disease with acute exacerbation    Xolair 150 mg subcutaneous injection  -- subcutaneous every 2 weeks  -- Indication: For Chronic obstructive pulmonary disease with acute exacerbation    montelukast 10 mg oral tablet  -- 1 tab(s) by mouth once a day  -- Indication: For Chronic obstructive pulmonary disease with acute exacerbation    pantoprazole 40 mg oral delayed release tablet  -- 1 tab(s) by mouth once a day (before a meal)  -- Indication: For GERD (gastroesophageal reflux disease)    Vitamin D3 5000 intl units oral tablet  -- 1 tab(s) by mouth once a day  -- Indication: For Health Maintenance    Vitamin C 500 mg oral tablet  -- 1 tab(s) by mouth once a day  -- Indication: For Health Maintenance

## 2017-12-24 LAB
GLUCOSE BLDC GLUCOMTR-MCNC: 207 MG/DL — HIGH (ref 70–99)
GLUCOSE BLDC GLUCOMTR-MCNC: 211 MG/DL — HIGH (ref 70–99)
GLUCOSE BLDC GLUCOMTR-MCNC: 224 MG/DL — HIGH (ref 70–99)
GLUCOSE BLDC GLUCOMTR-MCNC: 257 MG/DL — HIGH (ref 70–99)
GLUCOSE BLDC GLUCOMTR-MCNC: 264 MG/DL — HIGH (ref 70–99)
H PYLORI AG STL QL: NEGATIVE — SIGNIFICANT CHANGE UP

## 2017-12-24 PROCEDURE — 99233 SBSQ HOSP IP/OBS HIGH 50: CPT | Mod: GC

## 2017-12-24 PROCEDURE — 99222 1ST HOSP IP/OBS MODERATE 55: CPT | Mod: GC

## 2017-12-24 RX ORDER — OLANZAPINE 15 MG/1
2.5 TABLET, FILM COATED ORAL ONCE
Qty: 0 | Refills: 0 | Status: DISCONTINUED | OUTPATIENT
Start: 2017-12-24 | End: 2017-12-25

## 2017-12-24 RX ORDER — INSULIN GLARGINE 100 [IU]/ML
18 INJECTION, SOLUTION SUBCUTANEOUS AT BEDTIME
Qty: 0 | Refills: 0 | Status: DISCONTINUED | OUTPATIENT
Start: 2017-12-24 | End: 2017-12-29

## 2017-12-24 RX ORDER — ONDANSETRON 8 MG/1
4 TABLET, FILM COATED ORAL ONCE
Qty: 0 | Refills: 0 | Status: COMPLETED | OUTPATIENT
Start: 2017-12-24 | End: 2017-12-24

## 2017-12-24 RX ORDER — INSULIN LISPRO 100/ML
VIAL (ML) SUBCUTANEOUS AT BEDTIME
Qty: 0 | Refills: 0 | Status: DISCONTINUED | OUTPATIENT
Start: 2017-12-24 | End: 2017-12-29

## 2017-12-24 RX ADMIN — Medication 8 UNIT(S): at 09:09

## 2017-12-24 RX ADMIN — BUDESONIDE AND FORMOTEROL FUMARATE DIHYDRATE 2 PUFF(S): 160; 4.5 AEROSOL RESPIRATORY (INHALATION) at 17:39

## 2017-12-24 RX ADMIN — Medication 500 MILLIGRAM(S): at 17:48

## 2017-12-24 RX ADMIN — Medication 40 MILLIGRAM(S): at 05:36

## 2017-12-24 RX ADMIN — Medication 3 MILLILITER(S): at 23:39

## 2017-12-24 RX ADMIN — INSULIN GLARGINE 18 UNIT(S): 100 INJECTION, SOLUTION SUBCUTANEOUS at 22:05

## 2017-12-24 RX ADMIN — TIOTROPIUM BROMIDE 1 CAPSULE(S): 18 CAPSULE ORAL; RESPIRATORY (INHALATION) at 11:46

## 2017-12-24 RX ADMIN — BUDESONIDE AND FORMOTEROL FUMARATE DIHYDRATE 2 PUFF(S): 160; 4.5 AEROSOL RESPIRATORY (INHALATION) at 05:59

## 2017-12-24 RX ADMIN — Medication 5000 UNIT(S): at 17:57

## 2017-12-24 RX ADMIN — Medication 1 TABLET(S): at 05:35

## 2017-12-24 RX ADMIN — Medication 1 TABLET(S): at 17:50

## 2017-12-24 RX ADMIN — ONDANSETRON 4 MILLIGRAM(S): 8 TABLET, FILM COATED ORAL at 13:07

## 2017-12-24 RX ADMIN — Medication 0.25 MILLIGRAM(S): at 10:51

## 2017-12-24 RX ADMIN — Medication 4: at 17:47

## 2017-12-24 RX ADMIN — Medication 30 MILLILITER(S): at 12:31

## 2017-12-24 RX ADMIN — FAMOTIDINE 20 MILLIGRAM(S): 10 INJECTION INTRAVENOUS at 12:49

## 2017-12-24 RX ADMIN — SODIUM CHLORIDE 4 MILLILITER(S): 9 INJECTION INTRAMUSCULAR; INTRAVENOUS; SUBCUTANEOUS at 11:46

## 2017-12-24 RX ADMIN — VALSARTAN 40 MILLIGRAM(S): 80 TABLET ORAL at 05:35

## 2017-12-24 RX ADMIN — Medication 0.25 MILLIGRAM(S): at 05:35

## 2017-12-24 RX ADMIN — Medication 3 MILLILITER(S): at 11:46

## 2017-12-24 RX ADMIN — Medication 150 MILLIGRAM(S): at 17:54

## 2017-12-24 RX ADMIN — Medication 150 MILLIGRAM(S): at 05:36

## 2017-12-24 RX ADMIN — Medication 5 MILLIGRAM(S): at 05:36

## 2017-12-24 RX ADMIN — APIXABAN 5 MILLIGRAM(S): 2.5 TABLET, FILM COATED ORAL at 05:36

## 2017-12-24 RX ADMIN — Medication 8 UNIT(S): at 17:47

## 2017-12-24 RX ADMIN — MONTELUKAST 10 MILLIGRAM(S): 4 TABLET, CHEWABLE ORAL at 12:50

## 2017-12-24 RX ADMIN — Medication 3 MILLILITER(S): at 06:00

## 2017-12-24 RX ADMIN — PANTOPRAZOLE SODIUM 40 MILLIGRAM(S): 20 TABLET, DELAYED RELEASE ORAL at 05:36

## 2017-12-24 RX ADMIN — Medication 4: at 09:09

## 2017-12-24 RX ADMIN — Medication 8 UNIT(S): at 12:54

## 2017-12-24 RX ADMIN — Medication 6: at 12:53

## 2017-12-24 RX ADMIN — APIXABAN 5 MILLIGRAM(S): 2.5 TABLET, FILM COATED ORAL at 17:50

## 2017-12-24 RX ADMIN — Medication 3 MILLILITER(S): at 17:38

## 2017-12-24 NOTE — PROGRESS NOTE ADULT - PROBLEM SELECTOR PLAN 2
-given patient's CT findings of a pancreatic lesion that was seen in a prior CT in 2016, may need MRI OP   -Diabetic gastroparesis also a possibility; trial of Reglan started, patient did not like medication and declined medication at this time  -f/u CA 19-9 and CEA  -c/w Pepcid and maalox  -H. Pylori IgG neg, IgA postive, GI consulted for further recs-planning for EGD/colonoscopy tues as concern for ulcer vs less likely malignancy

## 2017-12-24 NOTE — PROGRESS NOTE ADULT - PROBLEM SELECTOR PLAN 5
-will start home medrol dose and discontinue current prednisone 40 as patient states her breathing better

## 2017-12-24 NOTE — PROGRESS NOTE ADULT - SUBJECTIVE AND OBJECTIVE BOX
RAYRAY RODRIGUEZ  67y  MRN: [id number]    Subjective:    Patient is a 67y old  Female who presents with a chief complaint of SOB and epigastric discomfort (23 Dec 2017 16:31)    Overnight Events: states SOB has much improved, still has mild epigastric pain, no N/V, CP, palpitations. Having BM's. All other ROS is negative.      MEDICATIONS  (STANDING):  ALBUTerol/ipratropium for Nebulization 3 milliLiter(s) Nebulizer every 6 hours  apixaban 5 milliGRAM(s) Oral every 12 hours  ascorbic acid 500 milliGRAM(s) Oral daily  buDESOnide 160 MICROgram(s)/formoterol 4.5 MICROgram(s) Inhaler 2 Puff(s) Inhalation two times a day  cholecalciferol 5000 Unit(s) Oral daily  dextrose 5%. 1000 milliLiter(s) (50 mL/Hr) IV Continuous <Continuous>  dextrose 50% Injectable 12.5 Gram(s) IV Push once  dextrose 50% Injectable 25 Gram(s) IV Push once  dextrose 50% Injectable 25 Gram(s) IV Push once  digoxin     Tablet 0.25 milliGRAM(s) Oral daily  famotidine    Tablet 20 milliGRAM(s) Oral daily  insulin glargine Injectable (LANTUS) 16 Unit(s) SubCutaneous at bedtime  insulin lispro (HumaLOG) corrective regimen sliding scale   SubCutaneous three times a day before meals  insulin lispro Injectable (HumaLOG) 8 Unit(s) SubCutaneous three times a day before meals  montelukast 10 milliGRAM(s) Oral daily  ondansetron   Disintegrating Tablet 4 milliGRAM(s) Oral once  pantoprazole    Tablet 40 milliGRAM(s) Oral before breakfast  predniSONE   Tablet 40 milliGRAM(s) Oral daily  pregabalin 150 milliGRAM(s) Oral two times a day  sodium chloride 3%  Inhalation 4 milliLiter(s) Inhalation daily  tiotropium 18 MICROgram(s) Capsule 1 Capsule(s) Inhalation daily  trimethoprim  160 mG/sulfamethoxazole 800 mG 1 Tablet(s) Oral two times a day  valsartan 40 milliGRAM(s) Oral daily    MEDICATIONS  (PRN):  aluminum hydroxide/magnesium hydroxide/simethicone Suspension 30 milliLiter(s) Oral every 4 hours PRN Dyspepsia  dextrose Gel 1 Dose(s) Oral once PRN Blood Glucose LESS THAN 70 milliGRAM(s)/deciliter  glucagon  Injectable 1 milliGRAM(s) IntraMuscular once PRN Glucose LESS THAN 70 milligrams/deciliter        Objective:    Vitals: Vital Signs Last 24 Hrs  T(C): 36.4 (12-24-17 @ 04:01), Max: 36.8 (12-23-17 @ 14:11)  T(F): 97.6 (12-24-17 @ 04:01), Max: 98.2 (12-23-17 @ 14:11)  HR: 96 (12-24-17 @ 10:09) (65 - 96)  BP: 125/69 (12-24-17 @ 10:09) (113/61 - 133/68)  BP(mean): --  RR: 18 (12-24-17 @ 04:01) (18 - 18)  SpO2: 98% (12-24-17 @ 06:00) (95% - 98%)            I&O's Summary      PHYSICAL EXAM:  General: NAD  HEENT: PERRLA  Lymph Nodes: no lymphadenopathy  Neck: supple  Respiratory: diffuse expiratory rhonchi, good air movement, no wheezing  Cardiovascular: RRR no murmurs  Abdomen: soft, non tender, non distended, +BS  Extremities: warm, no edema, no clubbing  Skin: intact, no rashes  Neurological: non focal, a xo x 3                                        LABS:  12-22    136  |  98  |  18  ----------------------------<  313<H>  5.1   |  26  |  0.92  12-21    139  |  100  |  16  ----------------------------<  152<H>  4.7   |  24  |  0.88    Ca    9.4      22 Dec 2017 03:38  Ca    9.5      21 Dec 2017 13:27    TPro  7.2  /  Alb  3.8  /  TBili  0.2  /  DBili  x   /  AST  10  /  ALT  12  /  AlkPhos  106  12-22  TPro  7.7  /  Alb  4.2  /  TBili  0.1<L>  /  DBili  x   /  AST  15  /  ALT  14  /  AlkPhos  109  12-21                                              14.0   7.9   )-----------( 301      ( 21 Dec 2017 13:27 )             43.7     CAPILLARY BLOOD GLUCOSE      POCT Blood Glucose.: 257 mg/dL (24 Dec 2017 10:07)  POCT Blood Glucose.: 224 mg/dL (24 Dec 2017 08:26)  POCT Blood Glucose.: 200 mg/dL (23 Dec 2017 22:20)  POCT Blood Glucose.: 293 mg/dL (23 Dec 2017 18:45)  POCT Blood Glucose.: 289 mg/dL (23 Dec 2017 17:29)  POCT Blood Glucose.: 350 mg/dL (23 Dec 2017 12:22)      RADIOLOGY & ADDITIONAL TESTS:    Imaging Personally Reviewed:  [ ] YES  [ ] NO      Consultants involved in case: GI, Pulm  Consultant(s) Notes Reviewed:  [X] YES  [ ] NO:   Care Discussed with Consultants/Other Providers [X] YES  [ ] NO        Dr. Dona Power, PGY2  [facility] Pager: [phone]  [facility] Pager: [phone] RAYRAY RODRIGUEZ  67y  MRN: [id number]    Subjective:    Patient is a 67y old  Female who presents with a chief complaint of SOB and epigastric discomfort (23 Dec 2017 16:31)    Overnight Events: states SOB has much improved, still has mild epigastric pain, no N/V, CP, palpitations. Having BM's. All other ROS is negative.      MEDICATIONS  (STANDING):  ALBUTerol/ipratropium for Nebulization 3 milliLiter(s) Nebulizer every 6 hours  apixaban 5 milliGRAM(s) Oral every 12 hours  ascorbic acid 500 milliGRAM(s) Oral daily  buDESOnide 160 MICROgram(s)/formoterol 4.5 MICROgram(s) Inhaler 2 Puff(s) Inhalation two times a day  cholecalciferol 5000 Unit(s) Oral daily  dextrose 5%. 1000 milliLiter(s) (50 mL/Hr) IV Continuous <Continuous>  dextrose 50% Injectable 12.5 Gram(s) IV Push once  dextrose 50% Injectable 25 Gram(s) IV Push once  dextrose 50% Injectable 25 Gram(s) IV Push once  digoxin     Tablet 0.25 milliGRAM(s) Oral daily  famotidine    Tablet 20 milliGRAM(s) Oral daily  insulin glargine Injectable (LANTUS) 16 Unit(s) SubCutaneous at bedtime  insulin lispro (HumaLOG) corrective regimen sliding scale   SubCutaneous three times a day before meals  insulin lispro Injectable (HumaLOG) 8 Unit(s) SubCutaneous three times a day before meals  montelukast 10 milliGRAM(s) Oral daily  ondansetron   Disintegrating Tablet 4 milliGRAM(s) Oral once  pantoprazole    Tablet 40 milliGRAM(s) Oral before breakfast  predniSONE   Tablet 40 milliGRAM(s) Oral daily  pregabalin 150 milliGRAM(s) Oral two times a day  sodium chloride 3%  Inhalation 4 milliLiter(s) Inhalation daily  tiotropium 18 MICROgram(s) Capsule 1 Capsule(s) Inhalation daily  trimethoprim  160 mG/sulfamethoxazole 800 mG 1 Tablet(s) Oral two times a day  valsartan 40 milliGRAM(s) Oral daily    MEDICATIONS  (PRN):  aluminum hydroxide/magnesium hydroxide/simethicone Suspension 30 milliLiter(s) Oral every 4 hours PRN Dyspepsia  dextrose Gel 1 Dose(s) Oral once PRN Blood Glucose LESS THAN 70 milliGRAM(s)/deciliter  glucagon  Injectable 1 milliGRAM(s) IntraMuscular once PRN Glucose LESS THAN 70 milligrams/deciliter        Objective:    Vitals: Vital Signs Last 24 Hrs  T(C): 36.4 (12-24-17 @ 04:01), Max: 36.8 (12-23-17 @ 14:11)  T(F): 97.6 (12-24-17 @ 04:01), Max: 98.2 (12-23-17 @ 14:11)  HR: 96 (12-24-17 @ 10:09) (65 - 96)  BP: 125/69 (12-24-17 @ 10:09) (113/61 - 133/68)  BP(mean): --  RR: 18 (12-24-17 @ 04:01) (18 - 18)  SpO2: 98% (12-24-17 @ 06:00) (95% - 98%)            I&O's Summary      PHYSICAL EXAM:  General: NAD  HEENT: PERRLA  Lymph Nodes: no lymphadenopathy  Neck: supple  Respiratory: diffuse expiratory rhonchi, good air movement, no wheezing  Cardiovascular: RRR no murmurs  Abdomen: soft, non tender, non distended, +BS  Extremities: warm, no edema, no clubbing  Skin: intact, no rashes  Neurological: non focal, a xo x 3                                        LABS:  personally reviewed  12-22    136  |  98  |  18  ----------------------------<  313<H>  5.1   |  26  |  0.92  12-21    139  |  100  |  16  ----------------------------<  152<H>  4.7   |  24  |  0.88    Ca    9.4      22 Dec 2017 03:38  Ca    9.5      21 Dec 2017 13:27    TPro  7.2  /  Alb  3.8  /  TBili  0.2  /  DBili  x   /  AST  10  /  ALT  12  /  AlkPhos  106  12-22  TPro  7.7  /  Alb  4.2  /  TBili  0.1<L>  /  DBili  x   /  AST  15  /  ALT  14  /  AlkPhos  109  12-21                                              14.0   7.9   )-----------( 301      ( 21 Dec 2017 13:27 )             43.7     CAPILLARY BLOOD GLUCOSE      POCT Blood Glucose.: 257 mg/dL (24 Dec 2017 10:07)  POCT Blood Glucose.: 224 mg/dL (24 Dec 2017 08:26)  POCT Blood Glucose.: 200 mg/dL (23 Dec 2017 22:20)  POCT Blood Glucose.: 293 mg/dL (23 Dec 2017 18:45)  POCT Blood Glucose.: 289 mg/dL (23 Dec 2017 17:29)  POCT Blood Glucose.: 350 mg/dL (23 Dec 2017 12:22)      RADIOLOGY & ADDITIONAL TESTS:    Imaging Personally Reviewed:  [ ] YES  [ ] NO      Consultants involved in case: GI, Pulm  Consultant(s) Notes Reviewed:  [X] YES  [ ] NO:   Care Discussed with Consultants/Other Providers [X] YES  [ ] NO        Dr. Dona Power, PGY2  [facility] Pager: [phone]  [facility] Pager: [phone]

## 2017-12-24 NOTE — PHYSICAL THERAPY INITIAL EVALUATION ADULT - PERTINENT HX OF CURRENT PROBLEM, REHAB EVAL
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

## 2017-12-24 NOTE — PHYSICAL THERAPY INITIAL EVALUATION ADULT - THERAPY FREQUENCY, PT EVAL
Patient is currently functioning at baseline (independent) and will be D/C from PT program at this time. Patient will remain on ambulation aide to ensure OOB/mobility/ambulation for remainder of hospital stay.

## 2017-12-24 NOTE — PROGRESS NOTE ADULT - PROBLEM SELECTOR PLAN 1
- Unclear if this is actually a COPD exacerbation. Patient is not truly hypoxic, and CT Chest does not show acute disease; and improved significantly while on the floor  - Continue Duonebs, decrease steroids to home dose  - Patient completed a sputum culture with her OP pulmonologist and it was positive for group B strep now requiring Bactrim for 10 days. Patient on day 6/10  -appreciate pulm recs  -will add incentive spirometry and acapella

## 2017-12-24 NOTE — PROGRESS NOTE ADULT - ASSESSMENT
66 YO F pmh of COPD, asthma, tracheobronchomalacia s/p tracheo-bronchoplasty (October 2016), Afib (on Eliquis), HTN, Adrenal Insufficiency (on chronic medrol), DM2, HTN, remote hx of DVT and squamous cell CA of the anus s/p chemo and RT who presents with the complaint of SOB and epigastric discomfort most likely due to COPD/asthma exacerbation vs ulcer

## 2017-12-24 NOTE — PROGRESS NOTE ADULT - PROBLEM SELECTOR PLAN 6
-Not controlled  -Increasing Humalog to 10/10/10 U  -will increase Lantus to 18 QHS  -Will continue to monitor  -FS TID and at bedtime  -ISS  CC DASH diet -c/w Humalog to 8/8/8 U, c/w Lantus to 16 QHS, will not increase as decreasing steroid dose and patient will be on clear liquid diet tomorrow  -Will continue to monitor  -FS TID and at bedtime  -ISS  CC DASH diet -c/w Humalog to 8/8/8 U, inc Lantus to 18 QHS, will not increase humalog as decreasing steroid dose and patient will be on clear liquid diet tomorrow  -Will continue to monitor  -FS TID and at bedtime  -ISS  CC DASH diet

## 2017-12-24 NOTE — PHYSICAL THERAPY INITIAL EVALUATION ADULT - ADDITIONAL COMMENTS
Patient was living at home in [place]. currently resides with sister in [facility] with 16 steps to enter the home. Patient is independent in ADL's and ambulation.

## 2017-12-24 NOTE — PHYSICAL THERAPY INITIAL EVALUATION ADULT - CRITERIA FOR SKILLED THERAPEUTIC INTERVENTIONS
anticipated discharge recommendation/rehab potential/impairments found/therapy frequency/anticipated equipment needs at discharge/functional limitations in following categories/predicted duration of therapy intervention/risk reduction/prevention

## 2017-12-25 DIAGNOSIS — C20 MALIGNANT NEOPLASM OF RECTUM: ICD-10-CM

## 2017-12-25 LAB
ALBUMIN SERPL ELPH-MCNC: 3.3 G/DL — SIGNIFICANT CHANGE UP (ref 3.3–5)
ALP SERPL-CCNC: 90 U/L — SIGNIFICANT CHANGE UP (ref 40–120)
ALT FLD-CCNC: 8 U/L RC — LOW (ref 10–45)
ANION GAP SERPL CALC-SCNC: 9 MMOL/L — SIGNIFICANT CHANGE UP (ref 5–17)
APTT BLD: 43.4 SEC — HIGH (ref 27.5–37.4)
AST SERPL-CCNC: 10 U/L — SIGNIFICANT CHANGE UP (ref 10–40)
BILIRUB SERPL-MCNC: 0.1 MG/DL — LOW (ref 0.2–1.2)
BUN SERPL-MCNC: 20 MG/DL — SIGNIFICANT CHANGE UP (ref 7–23)
CALCIUM SERPL-MCNC: 8.8 MG/DL — SIGNIFICANT CHANGE UP (ref 8.4–10.5)
CHLORIDE SERPL-SCNC: 101 MMOL/L — SIGNIFICANT CHANGE UP (ref 96–108)
CO2 SERPL-SCNC: 30 MMOL/L — SIGNIFICANT CHANGE UP (ref 22–31)
CREAT SERPL-MCNC: 0.91 MG/DL — SIGNIFICANT CHANGE UP (ref 0.5–1.3)
GLUCOSE BLDC GLUCOMTR-MCNC: 123 MG/DL — HIGH (ref 70–99)
GLUCOSE BLDC GLUCOMTR-MCNC: 129 MG/DL — HIGH (ref 70–99)
GLUCOSE BLDC GLUCOMTR-MCNC: 219 MG/DL — HIGH (ref 70–99)
GLUCOSE BLDC GLUCOMTR-MCNC: 220 MG/DL — HIGH (ref 70–99)
GLUCOSE BLDC GLUCOMTR-MCNC: 272 MG/DL — HIGH (ref 70–99)
GLUCOSE BLDC GLUCOMTR-MCNC: 68 MG/DL — LOW (ref 70–99)
GLUCOSE BLDC GLUCOMTR-MCNC: 68 MG/DL — LOW (ref 70–99)
GLUCOSE BLDC GLUCOMTR-MCNC: 70 MG/DL — SIGNIFICANT CHANGE UP (ref 70–99)
GLUCOSE SERPL-MCNC: 260 MG/DL — HIGH (ref 70–99)
HCT VFR BLD CALC: 36.9 % — SIGNIFICANT CHANGE UP (ref 34.5–45)
HGB BLD-MCNC: 11.7 G/DL — SIGNIFICANT CHANGE UP (ref 11.5–15.5)
INR BLD: 1.06 RATIO — SIGNIFICANT CHANGE UP (ref 0.88–1.16)
MAGNESIUM SERPL-MCNC: 2 MG/DL — SIGNIFICANT CHANGE UP (ref 1.6–2.6)
MCHC RBC-ENTMCNC: 24.7 PG — LOW (ref 27–34)
MCHC RBC-ENTMCNC: 31.8 GM/DL — LOW (ref 32–36)
MCV RBC AUTO: 77.7 FL — LOW (ref 80–100)
PHOSPHATE SERPL-MCNC: 3 MG/DL — SIGNIFICANT CHANGE UP (ref 2.5–4.5)
PLATELET # BLD AUTO: 257 K/UL — SIGNIFICANT CHANGE UP (ref 150–400)
POTASSIUM SERPL-MCNC: 4.3 MMOL/L — SIGNIFICANT CHANGE UP (ref 3.5–5.3)
POTASSIUM SERPL-SCNC: 4.3 MMOL/L — SIGNIFICANT CHANGE UP (ref 3.5–5.3)
PROT SERPL-MCNC: 6.1 G/DL — SIGNIFICANT CHANGE UP (ref 6–8.3)
PROTHROM AB SERPL-ACNC: 11.6 SEC — SIGNIFICANT CHANGE UP (ref 9.8–12.7)
RBC # BLD: 4.75 M/UL — SIGNIFICANT CHANGE UP (ref 3.8–5.2)
RBC # FLD: 13.8 % — SIGNIFICANT CHANGE UP (ref 10.3–14.5)
SODIUM SERPL-SCNC: 140 MMOL/L — SIGNIFICANT CHANGE UP (ref 135–145)
WBC # BLD: 7.6 K/UL — SIGNIFICANT CHANGE UP (ref 3.8–10.5)
WBC # FLD AUTO: 7.6 K/UL — SIGNIFICANT CHANGE UP (ref 3.8–10.5)

## 2017-12-25 PROCEDURE — 99233 SBSQ HOSP IP/OBS HIGH 50: CPT | Mod: GC

## 2017-12-25 RX ORDER — ALPRAZOLAM 0.25 MG
0.25 TABLET ORAL ONCE
Qty: 0 | Refills: 0 | Status: DISCONTINUED | OUTPATIENT
Start: 2017-12-25 | End: 2017-12-25

## 2017-12-25 RX ORDER — SOD SULF/SODIUM/NAHCO3/KCL/PEG
1000 SOLUTION, RECONSTITUTED, ORAL ORAL
Qty: 0 | Refills: 0 | Status: COMPLETED | OUTPATIENT
Start: 2017-12-25 | End: 2017-12-25

## 2017-12-25 RX ORDER — SENNA PLUS 8.6 MG/1
2 TABLET ORAL AT BEDTIME
Qty: 0 | Refills: 0 | Status: DISCONTINUED | OUTPATIENT
Start: 2017-12-25 | End: 2017-12-29

## 2017-12-25 RX ORDER — POLYETHYLENE GLYCOL 3350 17 G/17G
17 POWDER, FOR SOLUTION ORAL DAILY
Qty: 0 | Refills: 0 | Status: DISCONTINUED | OUTPATIENT
Start: 2017-12-25 | End: 2017-12-29

## 2017-12-25 RX ORDER — INSULIN LISPRO 100/ML
6 VIAL (ML) SUBCUTANEOUS ONCE
Qty: 0 | Refills: 0 | Status: COMPLETED | OUTPATIENT
Start: 2017-12-25 | End: 2017-12-25

## 2017-12-25 RX ORDER — HYDROCORTISONE 1 %
1 OINTMENT (GRAM) TOPICAL
Qty: 0 | Refills: 0 | Status: DISCONTINUED | OUTPATIENT
Start: 2017-12-25 | End: 2017-12-29

## 2017-12-25 RX ORDER — DOCUSATE SODIUM 100 MG
100 CAPSULE ORAL DAILY
Qty: 0 | Refills: 0 | Status: DISCONTINUED | OUTPATIENT
Start: 2017-12-25 | End: 2017-12-29

## 2017-12-25 RX ADMIN — MONTELUKAST 10 MILLIGRAM(S): 4 TABLET, CHEWABLE ORAL at 11:32

## 2017-12-25 RX ADMIN — Medication 5000 UNIT(S): at 11:33

## 2017-12-25 RX ADMIN — Medication 100 MILLIGRAM(S): at 11:33

## 2017-12-25 RX ADMIN — Medication 1 TABLET(S): at 18:09

## 2017-12-25 RX ADMIN — Medication 1 TABLET(S): at 05:17

## 2017-12-25 RX ADMIN — APIXABAN 5 MILLIGRAM(S): 2.5 TABLET, FILM COATED ORAL at 05:17

## 2017-12-25 RX ADMIN — Medication 3 MILLILITER(S): at 05:47

## 2017-12-25 RX ADMIN — BUDESONIDE AND FORMOTEROL FUMARATE DIHYDRATE 2 PUFF(S): 160; 4.5 AEROSOL RESPIRATORY (INHALATION) at 05:47

## 2017-12-25 RX ADMIN — Medication 150 MILLIGRAM(S): at 05:17

## 2017-12-25 RX ADMIN — Medication 0.25 MILLIGRAM(S): at 05:17

## 2017-12-25 RX ADMIN — Medication 3 MILLILITER(S): at 17:10

## 2017-12-25 RX ADMIN — Medication 0.25 MILLIGRAM(S): at 00:35

## 2017-12-25 RX ADMIN — SODIUM CHLORIDE 4 MILLILITER(S): 9 INJECTION INTRAMUSCULAR; INTRAVENOUS; SUBCUTANEOUS at 11:35

## 2017-12-25 RX ADMIN — Medication 500 MILLIGRAM(S): at 11:33

## 2017-12-25 RX ADMIN — TIOTROPIUM BROMIDE 1 CAPSULE(S): 18 CAPSULE ORAL; RESPIRATORY (INHALATION) at 11:53

## 2017-12-25 RX ADMIN — Medication 1 APPLICATION(S): at 18:10

## 2017-12-25 RX ADMIN — Medication 1000 MILLILITER(S): at 16:14

## 2017-12-25 RX ADMIN — POLYETHYLENE GLYCOL 3350 17 GRAM(S): 17 POWDER, FOR SOLUTION ORAL at 11:33

## 2017-12-25 RX ADMIN — VALSARTAN 40 MILLIGRAM(S): 80 TABLET ORAL at 05:17

## 2017-12-25 RX ADMIN — Medication 6: at 09:45

## 2017-12-25 RX ADMIN — Medication 3 MILLILITER(S): at 11:35

## 2017-12-25 RX ADMIN — Medication 8 UNIT(S): at 13:25

## 2017-12-25 RX ADMIN — Medication 1000 MILLILITER(S): at 18:15

## 2017-12-25 RX ADMIN — FAMOTIDINE 20 MILLIGRAM(S): 10 INJECTION INTRAVENOUS at 11:33

## 2017-12-25 RX ADMIN — Medication 4 MILLIGRAM(S): at 05:17

## 2017-12-25 RX ADMIN — Medication 6 UNIT(S): at 18:09

## 2017-12-25 RX ADMIN — SENNA PLUS 2 TABLET(S): 8.6 TABLET ORAL at 22:17

## 2017-12-25 RX ADMIN — PANTOPRAZOLE SODIUM 40 MILLIGRAM(S): 20 TABLET, DELAYED RELEASE ORAL at 05:16

## 2017-12-25 RX ADMIN — BUDESONIDE AND FORMOTEROL FUMARATE DIHYDRATE 2 PUFF(S): 160; 4.5 AEROSOL RESPIRATORY (INHALATION) at 17:10

## 2017-12-25 RX ADMIN — Medication 8 UNIT(S): at 09:45

## 2017-12-25 RX ADMIN — Medication 150 MILLIGRAM(S): at 18:15

## 2017-12-25 RX ADMIN — Medication 4: at 13:25

## 2017-12-25 RX ADMIN — INSULIN GLARGINE 18 UNIT(S): 100 INJECTION, SOLUTION SUBCUTANEOUS at 22:17

## 2017-12-25 NOTE — PROGRESS NOTE ADULT - PROBLEM SELECTOR PROBLEM 4
Paroxysmal atrial fibrillation Aortic valve insufficiency, etiology of cardiac valve disease unspecified

## 2017-12-25 NOTE — PROGRESS NOTE ADULT - PROBLEM SELECTOR PLAN 1
- Unclear if this is actually a COPD exacerbation. Patient is not truly hypoxic, and CT Chest does not show acute disease; and improved significantly while on the floor  - Continue Duonebs, and home dose steroids  - Patient completed a sputum culture with her OP pulmonologist and it was positive for group B strep now requiring Bactrim day 7/10  - appreciate pulm recs  - continue incentive spirometry and acapella

## 2017-12-25 NOTE — PROGRESS NOTE ADULT - ASSESSMENT
68 YO F pmh of COPD, asthma, tracheobronchomalacia s/p tracheo-bronchoplasty (October 2016), Afib (on Eliquis), HTN, Adrenal Insufficiency (on chronic medrol), DM2, HTN, remote hx of DVT and squamous cell CA of the anus s/p chemo and RT who presents with the complaint of SOB and epigastric discomfort most likely due to COPD/asthma exacerbation vs ulcer

## 2017-12-25 NOTE — PROGRESS NOTE ADULT - PROBLEM SELECTOR PLAN 7
- BP well controlled  - C/w Valsartan 40mg  - will continue to monitor - FS persistently in the low 200s  - lantus increased to 18u yesterday  - c/w Humalog to 8/8/8 U and mod dose ISS  - pt currently on clears diet. will monitor FS today and adjust as necessary once she is on a reg diet again  - FS TID and at bedtime

## 2017-12-25 NOTE — PROGRESS NOTE ADULT - PROBLEM SELECTOR PROBLEM 6
Type 2 diabetes mellitus without complication, with long-term current use of insulin Adrenal insufficiency

## 2017-12-25 NOTE — PROGRESS NOTE ADULT - PROBLEM SELECTOR PLAN 3
- CT chest notes pulmonary HTN which could be cause of dyspnea hx of rectal CA s/p chemoRx and RT, in remission as per pt. Follows Dr. Maxwell ([facility]) who told the patient that there is still some tumor burden which should not be biopsied due to risk of infection.   - will contact Dr. Maxwell tmrbob before colonoscopy  - plan for PET as outpatient

## 2017-12-25 NOTE — PROGRESS NOTE ADULT - PROBLEM SELECTOR PLAN 8
- DVT ppx: Eliquis  - DISPO: PT  - Diet CC DASH    Mark Kaur, PGY-1  Care Model B  Pager [phone] - DVT ppx: Eliquis  - DISPO: PT  - Diet: CC DASH, NPO at midnight     Mark Kaur, PGY-1  Care Model B  Pager [phone] - BP well controlled  - C/w Valsartan 40mg  - will continue to monitor

## 2017-12-25 NOTE — PROGRESS NOTE ADULT - PROBLEM SELECTOR PLAN 2
- given patient's CT findings of a pancreatic lesion that was seen in a prior CT in 2016, may need MRI OP   - Diabetic gastroparesis also a possibility; trial of Reglan started however patient did not like medication and is declining medication at this time  - CA 19-9 and CEA WNL  - c/w Pepcid and maalox  - Per GI, pt planned for EGD/colonoscopy tomorrow for possible ulcer vs less likely malignancy - given patient's CT findings of a pancreatic lesion that was seen in a prior CT in 2016, may need MRI OP   - Diabetic gastroparesis also a possibility; trial of Reglan started however patient did not like medication and is declining medication at this time  - CA 19-9 and CEA WNL  - c/w Pepcid and maalox  - Per GI, pt planned for EGD/colonoscopy tomorrow for possible ulcer vs less likely malignancy  - NPO at midnight

## 2017-12-25 NOTE — PROGRESS NOTE ADULT - PROBLEM SELECTOR PLAN 6
- FS persistently in the low 200s  - lantus increased to 18u yesterday  - c/w Humalog to 8/8/8 U and mod dose ISS  - pt currently on clears diet. will monitor FS today and adjust as necessary once she is on a reg diet again  - FS TID and at bedtime - cont home medrol dose

## 2017-12-25 NOTE — PROGRESS NOTE ADULT - SUBJECTIVE AND OBJECTIVE BOX
Patient is a 67y old  Female who presents with a chief complaint of SOB and epigastric discomfort (23 Dec 2017 16:31)      SUBJECTIVE / OVERNIGHT EVENTS:  Pt had ERIKA overnight. Pt denies any fevers/chills, CP, palpitations, SOB, abdominal pain, N/V/D/C, melena, BRBPR, dysuria, hematuria    MEDICATIONS  (STANDING):  ALBUTerol/ipratropium for Nebulization 3 milliLiter(s) Nebulizer every 6 hours  apixaban 5 milliGRAM(s) Oral every 12 hours  ascorbic acid 500 milliGRAM(s) Oral daily  buDESOnide 160 MICROgram(s)/formoterol 4.5 MICROgram(s) Inhaler 2 Puff(s) Inhalation two times a day  cholecalciferol 5000 Unit(s) Oral daily  dextrose 5%. 1000 milliLiter(s) (50 mL/Hr) IV Continuous <Continuous>  dextrose 50% Injectable 12.5 Gram(s) IV Push once  dextrose 50% Injectable 25 Gram(s) IV Push once  dextrose 50% Injectable 25 Gram(s) IV Push once  digoxin     Tablet 0.25 milliGRAM(s) Oral daily  famotidine    Tablet 20 milliGRAM(s) Oral daily  insulin glargine Injectable (LANTUS) 18 Unit(s) SubCutaneous at bedtime  insulin lispro (HumaLOG) corrective regimen sliding scale   SubCutaneous at bedtime  insulin lispro (HumaLOG) corrective regimen sliding scale   SubCutaneous three times a day before meals  insulin lispro Injectable (HumaLOG) 8 Unit(s) SubCutaneous three times a day before meals  methylPREDNISolone 4 milliGRAM(s) Oral daily  montelukast 10 milliGRAM(s) Oral daily  ondansetron   Disintegrating Tablet 4 milliGRAM(s) Oral once  pantoprazole    Tablet 40 milliGRAM(s) Oral before breakfast  pregabalin 150 milliGRAM(s) Oral two times a day  sodium chloride 3%  Inhalation 4 milliLiter(s) Inhalation daily  tiotropium 18 MICROgram(s) Capsule 1 Capsule(s) Inhalation daily  trimethoprim  160 mG/sulfamethoxazole 800 mG 1 Tablet(s) Oral two times a day  valsartan 40 milliGRAM(s) Oral daily    MEDICATIONS  (PRN):  aluminum hydroxide/magnesium hydroxide/simethicone Suspension 30 milliLiter(s) Oral every 4 hours PRN Dyspepsia  dextrose Gel 1 Dose(s) Oral once PRN Blood Glucose LESS THAN 70 milliGRAM(s)/deciliter  glucagon  Injectable 1 milliGRAM(s) IntraMuscular once PRN Glucose LESS THAN 70 milligrams/deciliter      OBJECTIVE:    Vital Signs Last 24 Hrs  T(C): 36.6 (25 Dec 2017 04:55), Max: 36.8 (24 Dec 2017 13:38)  T(F): 97.9 (25 Dec 2017 04:55), Max: 98.2 (24 Dec 2017 13:38)  HR: 70 (25 Dec 2017 05:47) (64 - 96)  BP: 113/67 (25 Dec 2017 04:55) (110/67 - 130/68)  BP(mean): --  RR: 18 (25 Dec 2017 04:55) (16 - 18)  SpO2: 99% (25 Dec 2017 05:47) (93% - 99%)    CAPILLARY BLOOD GLUCOSE      POCT Blood Glucose.: 211 mg/dL (24 Dec 2017 21:48)  POCT Blood Glucose.: 207 mg/dL (24 Dec 2017 17:40)  POCT Blood Glucose.: 264 mg/dL (24 Dec 2017 12:43)  POCT Blood Glucose.: 257 mg/dL (24 Dec 2017 10:07)  POCT Blood Glucose.: 224 mg/dL (24 Dec 2017 08:26)    I&O's Summary      PHYSICAL EXAM:  GENERAL: NAD, well-developed  HEAD:  Atraumatic, Normocephalic  EYES: EOMI, PERRLA, conjunctiva and sclera clear  NECK: Supple, No JVD  CHEST/LUNG: Clear to auscultation bilaterally; No wheeze  HEART: Regular rate and rhythm; No murmurs, rubs, or gallops  ABDOMEN: Soft, Nontender, Nondistended; Bowel sounds present  EXTREMITIES:  2+ Peripheral Pulses, No clubbing, cyanosis, or edema  PSYCH: AAOx3  NEUROLOGY: non-focal  SKIN: No rashes or lesions    LABS:                        11.7   7.6   )-----------( 257      ( 25 Dec 2017 05:01 )             36.9     Auto Eosinophil # x     / Auto Eosinophil % x     / Auto Neutrophil # x     / Auto Neutrophil % x     / BANDS % x        12-25    140  |  101  |  20  ----------------------------<  260<H>  4.3   |  30  |  0.91    Ca    8.8      25 Dec 2017 05:01  Mg     2.0     12-25  Phos  3.0     12-25  TPro  6.1  /  Alb  3.3  /  TBili  0.1<L>  /  DBili  x   /  AST  10  /  ALT  8<L>  /  AlkPhos  90  12-25    PT/INR - ( 25 Dec 2017 05:00 )   PT: 11.6 sec;   INR: 1.06 ratio         PTT - ( 25 Dec 2017 05:00 )  PTT:43.4 sec            RESPIRATORY  VENT:    ABG:     VBG:     RADIOLOGY & ADDITIONAL TESTS:  (Imaging Personally Reviewed)    Consultant(s) Notes Reviewed:      Care Discussed with Consultants/Other Providers: Patient is a 67y old  Female who presents with a chief complaint of SOB and epigastric discomfort (23 Dec 2017 16:31)      SUBJECTIVE / OVERNIGHT EVENTS:  Pt had ERIKA overnight. Pt continues to endorse epigastric pain which is unchanged from prior. Pt denies any fevers/chills, CP, palpitations, SOB, N/V/D/C, melena, BRBPR, dysuria, hematuria    MEDICATIONS  (STANDING):  ALBUTerol/ipratropium for Nebulization 3 milliLiter(s) Nebulizer every 6 hours  apixaban 5 milliGRAM(s) Oral every 12 hours  ascorbic acid 500 milliGRAM(s) Oral daily  buDESOnide 160 MICROgram(s)/formoterol 4.5 MICROgram(s) Inhaler 2 Puff(s) Inhalation two times a day  cholecalciferol 5000 Unit(s) Oral daily  dextrose 5%. 1000 milliLiter(s) (50 mL/Hr) IV Continuous <Continuous>  dextrose 50% Injectable 12.5 Gram(s) IV Push once  dextrose 50% Injectable 25 Gram(s) IV Push once  dextrose 50% Injectable 25 Gram(s) IV Push once  digoxin     Tablet 0.25 milliGRAM(s) Oral daily  famotidine    Tablet 20 milliGRAM(s) Oral daily  insulin glargine Injectable (LANTUS) 18 Unit(s) SubCutaneous at bedtime  insulin lispro (HumaLOG) corrective regimen sliding scale   SubCutaneous at bedtime  insulin lispro (HumaLOG) corrective regimen sliding scale   SubCutaneous three times a day before meals  insulin lispro Injectable (HumaLOG) 8 Unit(s) SubCutaneous three times a day before meals  methylPREDNISolone 4 milliGRAM(s) Oral daily  montelukast 10 milliGRAM(s) Oral daily  ondansetron   Disintegrating Tablet 4 milliGRAM(s) Oral once  pantoprazole    Tablet 40 milliGRAM(s) Oral before breakfast  pregabalin 150 milliGRAM(s) Oral two times a day  sodium chloride 3%  Inhalation 4 milliLiter(s) Inhalation daily  tiotropium 18 MICROgram(s) Capsule 1 Capsule(s) Inhalation daily  trimethoprim  160 mG/sulfamethoxazole 800 mG 1 Tablet(s) Oral two times a day  valsartan 40 milliGRAM(s) Oral daily    MEDICATIONS  (PRN):  aluminum hydroxide/magnesium hydroxide/simethicone Suspension 30 milliLiter(s) Oral every 4 hours PRN Dyspepsia  dextrose Gel 1 Dose(s) Oral once PRN Blood Glucose LESS THAN 70 milliGRAM(s)/deciliter  glucagon  Injectable 1 milliGRAM(s) IntraMuscular once PRN Glucose LESS THAN 70 milligrams/deciliter      OBJECTIVE:    Vital Signs Last 24 Hrs  T(C): 36.6 (25 Dec 2017 04:55), Max: 36.8 (24 Dec 2017 13:38)  T(F): 97.9 (25 Dec 2017 04:55), Max: 98.2 (24 Dec 2017 13:38)  HR: 70 (25 Dec 2017 05:47) (64 - 96)  BP: 113/67 (25 Dec 2017 04:55) (110/67 - 130/68)  BP(mean): --  RR: 18 (25 Dec 2017 04:55) (16 - 18)  SpO2: 99% (25 Dec 2017 05:47) (93% - 99%)    CAPILLARY BLOOD GLUCOSE      POCT Blood Glucose.: 211 mg/dL (24 Dec 2017 21:48)  POCT Blood Glucose.: 207 mg/dL (24 Dec 2017 17:40)  POCT Blood Glucose.: 264 mg/dL (24 Dec 2017 12:43)  POCT Blood Glucose.: 257 mg/dL (24 Dec 2017 10:07)  POCT Blood Glucose.: 224 mg/dL (24 Dec 2017 08:26)    I&O's Summary      PHYSICAL EXAM:  GENERAL: NAD, well-developed  HEAD:  Atraumatic, Normocephalic  EYES: EOMI, PERRLA, conjunctiva and sclera clear  NECK: Supple, No JVD  CHEST/LUNG: prominent wheezing in the left lung fields  HEART: Regular rate and rhythm; No murmurs, rubs, or gallops  ABDOMEN: Soft, Nontender, Nondistended; Bowel sounds present  EXTREMITIES:  2+ Peripheral Pulses, No clubbing, cyanosis, or edema  PSYCH: AAOx3  NEUROLOGY: non-focal  SKIN: No rashes or lesions    LABS:                        11.7   7.6   )-----------( 257      ( 25 Dec 2017 05:01 )             36.9     Auto Eosinophil # x     / Auto Eosinophil % x     / Auto Neutrophil # x     / Auto Neutrophil % x     / BANDS % x        12-25    140  |  101  |  20  ----------------------------<  260<H>  4.3   |  30  |  0.91    Ca    8.8      25 Dec 2017 05:01  Mg     2.0     12-25  Phos  3.0     12-25  TPro  6.1  /  Alb  3.3  /  TBili  0.1<L>  /  DBili  x   /  AST  10  /  ALT  8<L>  /  AlkPhos  90  12-25    PT/INR - ( 25 Dec 2017 05:00 )   PT: 11.6 sec;   INR: 1.06 ratio         PTT - ( 25 Dec 2017 05:00 )  PTT:43.4 sec            RESPIRATORY  VENT:    ABG:     VBG:     RADIOLOGY & ADDITIONAL TESTS:  (Imaging Personally Reviewed)    Consultant(s) Notes Reviewed:      Care Discussed with Consultants/Other Providers: Patient is a 67y old  Female who presents with a chief complaint of SOB and epigastric discomfort (23 Dec 2017 16:31)      SUBJECTIVE / OVERNIGHT EVENTS:  Pt had ERIKA overnight. Pt continues to endorse epigastric pain which is unchanged from prior. Pt denies any fevers/chills, CP, palpitations, SOB, N/V/D/C, melena, BRBPR, dysuria, hematuria    MEDICATIONS  (STANDING):  ALBUTerol/ipratropium for Nebulization 3 milliLiter(s) Nebulizer every 6 hours  apixaban 5 milliGRAM(s) Oral every 12 hours  ascorbic acid 500 milliGRAM(s) Oral daily  buDESOnide 160 MICROgram(s)/formoterol 4.5 MICROgram(s) Inhaler 2 Puff(s) Inhalation two times a day  cholecalciferol 5000 Unit(s) Oral daily  dextrose 5%. 1000 milliLiter(s) (50 mL/Hr) IV Continuous <Continuous>  dextrose 50% Injectable 12.5 Gram(s) IV Push once  dextrose 50% Injectable 25 Gram(s) IV Push once  dextrose 50% Injectable 25 Gram(s) IV Push once  digoxin     Tablet 0.25 milliGRAM(s) Oral daily  famotidine    Tablet 20 milliGRAM(s) Oral daily  insulin glargine Injectable (LANTUS) 18 Unit(s) SubCutaneous at bedtime  insulin lispro (HumaLOG) corrective regimen sliding scale   SubCutaneous at bedtime  insulin lispro (HumaLOG) corrective regimen sliding scale   SubCutaneous three times a day before meals  insulin lispro Injectable (HumaLOG) 8 Unit(s) SubCutaneous three times a day before meals  methylPREDNISolone 4 milliGRAM(s) Oral daily  montelukast 10 milliGRAM(s) Oral daily  ondansetron   Disintegrating Tablet 4 milliGRAM(s) Oral once  pantoprazole    Tablet 40 milliGRAM(s) Oral before breakfast  pregabalin 150 milliGRAM(s) Oral two times a day  sodium chloride 3%  Inhalation 4 milliLiter(s) Inhalation daily  tiotropium 18 MICROgram(s) Capsule 1 Capsule(s) Inhalation daily  trimethoprim  160 mG/sulfamethoxazole 800 mG 1 Tablet(s) Oral two times a day  valsartan 40 milliGRAM(s) Oral daily    MEDICATIONS  (PRN):  aluminum hydroxide/magnesium hydroxide/simethicone Suspension 30 milliLiter(s) Oral every 4 hours PRN Dyspepsia  dextrose Gel 1 Dose(s) Oral once PRN Blood Glucose LESS THAN 70 milliGRAM(s)/deciliter  glucagon  Injectable 1 milliGRAM(s) IntraMuscular once PRN Glucose LESS THAN 70 milligrams/deciliter      OBJECTIVE:    Vital Signs Last 24 Hrs  T(C): 36.6 (25 Dec 2017 04:55), Max: 36.8 (24 Dec 2017 13:38)  T(F): 97.9 (25 Dec 2017 04:55), Max: 98.2 (24 Dec 2017 13:38)  HR: 70 (25 Dec 2017 05:47) (64 - 96)  BP: 113/67 (25 Dec 2017 04:55) (110/67 - 130/68)  BP(mean): --  RR: 18 (25 Dec 2017 04:55) (16 - 18)  SpO2: 99% (25 Dec 2017 05:47) (93% - 99%)    CAPILLARY BLOOD GLUCOSE      POCT Blood Glucose.: 211 mg/dL (24 Dec 2017 21:48)  POCT Blood Glucose.: 207 mg/dL (24 Dec 2017 17:40)  POCT Blood Glucose.: 264 mg/dL (24 Dec 2017 12:43)  POCT Blood Glucose.: 257 mg/dL (24 Dec 2017 10:07)  POCT Blood Glucose.: 224 mg/dL (24 Dec 2017 08:26)    I&O's Summary      PHYSICAL EXAM:  GENERAL: NAD, well-developed  HEAD:  Atraumatic, Normocephalic  EYES: EOMI, PERRLA, conjunctiva and sclera clear  NECK: Supple, No JVD  CHEST/LUNG: prominent wheezing in the left lung fields  HEART: Regular rate and rhythm; No murmurs, rubs, or gallops  ABDOMEN: Soft, Nontender, Nondistended; Bowel sounds present  EXTREMITIES:  2+ Peripheral Pulses, No clubbing, cyanosis, or edema  PSYCH: AAOx3  NEUROLOGY: non-focal  SKIN: No rashes or lesions    LABS:  personally reviewed                        11.7   7.6   )-----------( 257      ( 25 Dec 2017 05:01 )             36.9     Auto Eosinophil # x     / Auto Eosinophil % x     / Auto Neutrophil # x     / Auto Neutrophil % x     / BANDS % x        12-25    140  |  101  |  20  ----------------------------<  260<H>  4.3   |  30  |  0.91    Ca    8.8      25 Dec 2017 05:01  Mg     2.0     12-25  Phos  3.0     12-25  TPro  6.1  /  Alb  3.3  /  TBili  0.1<L>  /  DBili  x   /  AST  10  /  ALT  8<L>  /  AlkPhos  90  12-25    PT/INR - ( 25 Dec 2017 05:00 )   PT: 11.6 sec;   INR: 1.06 ratio         PTT - ( 25 Dec 2017 05:00 )  PTT:43.4 sec            RESPIRATORY  VENT:    ABG:     VBG:     RADIOLOGY & ADDITIONAL TESTS:  (Imaging Personally Reviewed)    Consultant(s) Notes Reviewed:  GI    Care Discussed with Consultants/Other Providers:

## 2017-12-26 ENCOUNTER — RESULT REVIEW (OUTPATIENT)
Age: 67
End: 2017-12-26

## 2017-12-26 ENCOUNTER — APPOINTMENT (OUTPATIENT)
Dept: PULMONOLOGY | Facility: CLINIC | Age: 67
End: 2017-12-26

## 2017-12-26 DIAGNOSIS — I35.1 NONRHEUMATIC AORTIC (VALVE) INSUFFICIENCY: ICD-10-CM

## 2017-12-26 DIAGNOSIS — J45.909 UNSPECIFIED ASTHMA, UNCOMPLICATED: ICD-10-CM

## 2017-12-26 DIAGNOSIS — J20.9 ACUTE BRONCHITIS, UNSPECIFIED: ICD-10-CM

## 2017-12-26 DIAGNOSIS — J39.8 OTHER SPECIFIED DISEASES OF UPPER RESPIRATORY TRACT: ICD-10-CM

## 2017-12-26 DIAGNOSIS — K21.9 GASTRO-ESOPHAGEAL REFLUX DISEASE WITHOUT ESOPHAGITIS: ICD-10-CM

## 2017-12-26 DIAGNOSIS — R06.02 SHORTNESS OF BREATH: ICD-10-CM

## 2017-12-26 LAB
ALBUMIN SERPL ELPH-MCNC: 3.2 G/DL — LOW (ref 3.3–5)
ALP SERPL-CCNC: 91 U/L — SIGNIFICANT CHANGE UP (ref 40–120)
ALT FLD-CCNC: 10 U/L RC — SIGNIFICANT CHANGE UP (ref 10–45)
ANION GAP SERPL CALC-SCNC: 10 MMOL/L — SIGNIFICANT CHANGE UP (ref 5–17)
APTT BLD: 30 SEC — SIGNIFICANT CHANGE UP (ref 27.5–37.4)
AST SERPL-CCNC: 10 U/L — SIGNIFICANT CHANGE UP (ref 10–40)
BILIRUB SERPL-MCNC: 0.1 MG/DL — LOW (ref 0.2–1.2)
BUN SERPL-MCNC: 11 MG/DL — SIGNIFICANT CHANGE UP (ref 7–23)
CALCIUM SERPL-MCNC: 8.6 MG/DL — SIGNIFICANT CHANGE UP (ref 8.4–10.5)
CHLORIDE SERPL-SCNC: 97 MMOL/L — SIGNIFICANT CHANGE UP (ref 96–108)
CO2 SERPL-SCNC: 31 MMOL/L — SIGNIFICANT CHANGE UP (ref 22–31)
CREAT SERPL-MCNC: 0.82 MG/DL — SIGNIFICANT CHANGE UP (ref 0.5–1.3)
GLUCOSE BLDC GLUCOMTR-MCNC: 150 MG/DL — HIGH (ref 70–99)
GLUCOSE BLDC GLUCOMTR-MCNC: 158 MG/DL — HIGH (ref 70–99)
GLUCOSE BLDC GLUCOMTR-MCNC: 189 MG/DL — HIGH (ref 70–99)
GLUCOSE BLDC GLUCOMTR-MCNC: 214 MG/DL — HIGH (ref 70–99)
GLUCOSE BLDC GLUCOMTR-MCNC: 96 MG/DL — SIGNIFICANT CHANGE UP (ref 70–99)
GLUCOSE SERPL-MCNC: 91 MG/DL — SIGNIFICANT CHANGE UP (ref 70–99)
HCT VFR BLD CALC: 39.8 % — SIGNIFICANT CHANGE UP (ref 34.5–45)
HGB BLD-MCNC: 12.6 G/DL — SIGNIFICANT CHANGE UP (ref 11.5–15.5)
INR BLD: 1.08 RATIO — SIGNIFICANT CHANGE UP (ref 0.88–1.16)
MAGNESIUM SERPL-MCNC: 2 MG/DL — SIGNIFICANT CHANGE UP (ref 1.6–2.6)
MCHC RBC-ENTMCNC: 24.7 PG — LOW (ref 27–34)
MCHC RBC-ENTMCNC: 31.7 GM/DL — LOW (ref 32–36)
MCV RBC AUTO: 78 FL — LOW (ref 80–100)
PHOSPHATE SERPL-MCNC: 4.4 MG/DL — SIGNIFICANT CHANGE UP (ref 2.5–4.5)
PLATELET # BLD AUTO: 259 K/UL — SIGNIFICANT CHANGE UP (ref 150–400)
POTASSIUM SERPL-MCNC: 3.7 MMOL/L — SIGNIFICANT CHANGE UP (ref 3.5–5.3)
POTASSIUM SERPL-SCNC: 3.7 MMOL/L — SIGNIFICANT CHANGE UP (ref 3.5–5.3)
PROT SERPL-MCNC: 6 G/DL — SIGNIFICANT CHANGE UP (ref 6–8.3)
PROTHROM AB SERPL-ACNC: 11.7 SEC — SIGNIFICANT CHANGE UP (ref 9.8–12.7)
RBC # BLD: 5.1 M/UL — SIGNIFICANT CHANGE UP (ref 3.8–5.2)
RBC # FLD: 14.1 % — SIGNIFICANT CHANGE UP (ref 10.3–14.5)
SODIUM SERPL-SCNC: 138 MMOL/L — SIGNIFICANT CHANGE UP (ref 135–145)
WBC # BLD: 6.1 K/UL — SIGNIFICANT CHANGE UP (ref 3.8–10.5)
WBC # FLD AUTO: 6.1 K/UL — SIGNIFICANT CHANGE UP (ref 3.8–10.5)

## 2017-12-26 PROCEDURE — 99233 SBSQ HOSP IP/OBS HIGH 50: CPT | Mod: GC

## 2017-12-26 PROCEDURE — 43239 EGD BIOPSY SINGLE/MULTIPLE: CPT | Mod: GC

## 2017-12-26 PROCEDURE — 88312 SPECIAL STAINS GROUP 1: CPT | Mod: 26

## 2017-12-26 PROCEDURE — 99233 SBSQ HOSP IP/OBS HIGH 50: CPT

## 2017-12-26 PROCEDURE — 45378 DIAGNOSTIC COLONOSCOPY: CPT | Mod: 52,GC

## 2017-12-26 PROCEDURE — 88305 TISSUE EXAM BY PATHOLOGIST: CPT | Mod: 26

## 2017-12-26 RX ORDER — SODIUM CHLORIDE 9 MG/ML
1000 INJECTION, SOLUTION INTRAVENOUS
Qty: 0 | Refills: 0 | Status: DISCONTINUED | OUTPATIENT
Start: 2017-12-26 | End: 2017-12-26

## 2017-12-26 RX ORDER — LACTULOSE 10 G/15ML
15 SOLUTION ORAL ONCE
Qty: 0 | Refills: 0 | Status: COMPLETED | OUTPATIENT
Start: 2017-12-26 | End: 2017-12-26

## 2017-12-26 RX ORDER — INSULIN LISPRO 100/ML
VIAL (ML) SUBCUTANEOUS
Qty: 0 | Refills: 0 | Status: DISCONTINUED | OUTPATIENT
Start: 2017-12-26 | End: 2017-12-29

## 2017-12-26 RX ORDER — TIOTROPIUM BROMIDE 18 UG/1
1 CAPSULE ORAL; RESPIRATORY (INHALATION) DAILY
Qty: 0 | Refills: 0 | Status: DISCONTINUED | OUTPATIENT
Start: 2017-12-26 | End: 2017-12-29

## 2017-12-26 RX ADMIN — PANTOPRAZOLE SODIUM 40 MILLIGRAM(S): 20 TABLET, DELAYED RELEASE ORAL at 06:27

## 2017-12-26 RX ADMIN — Medication 3 MILLILITER(S): at 23:44

## 2017-12-26 RX ADMIN — Medication 3 MILLILITER(S): at 00:05

## 2017-12-26 RX ADMIN — Medication 1 TABLET(S): at 06:27

## 2017-12-26 RX ADMIN — TIOTROPIUM BROMIDE 1 CAPSULE(S): 18 CAPSULE ORAL; RESPIRATORY (INHALATION) at 11:21

## 2017-12-26 RX ADMIN — Medication 8 UNIT(S): at 18:11

## 2017-12-26 RX ADMIN — POLYETHYLENE GLYCOL 3350 17 GRAM(S): 17 POWDER, FOR SOLUTION ORAL at 17:45

## 2017-12-26 RX ADMIN — BUDESONIDE AND FORMOTEROL FUMARATE DIHYDRATE 2 PUFF(S): 160; 4.5 AEROSOL RESPIRATORY (INHALATION) at 17:09

## 2017-12-26 RX ADMIN — Medication 1 APPLICATION(S): at 06:28

## 2017-12-26 RX ADMIN — Medication 2: at 18:10

## 2017-12-26 RX ADMIN — Medication 1 APPLICATION(S): at 17:45

## 2017-12-26 RX ADMIN — SENNA PLUS 2 TABLET(S): 8.6 TABLET ORAL at 21:43

## 2017-12-26 RX ADMIN — Medication 0.25 MILLIGRAM(S): at 06:27

## 2017-12-26 RX ADMIN — Medication 500 MILLIGRAM(S): at 17:45

## 2017-12-26 RX ADMIN — Medication 4 MILLIGRAM(S): at 06:27

## 2017-12-26 RX ADMIN — VALSARTAN 40 MILLIGRAM(S): 80 TABLET ORAL at 06:27

## 2017-12-26 RX ADMIN — BUDESONIDE AND FORMOTEROL FUMARATE DIHYDRATE 2 PUFF(S): 160; 4.5 AEROSOL RESPIRATORY (INHALATION) at 06:24

## 2017-12-26 RX ADMIN — INSULIN GLARGINE 18 UNIT(S): 100 INJECTION, SOLUTION SUBCUTANEOUS at 21:57

## 2017-12-26 RX ADMIN — Medication 5000 UNIT(S): at 17:45

## 2017-12-26 RX ADMIN — Medication 1 TABLET(S): at 17:44

## 2017-12-26 RX ADMIN — Medication 3 MILLILITER(S): at 17:09

## 2017-12-26 RX ADMIN — SODIUM CHLORIDE 50 MILLILITER(S): 9 INJECTION, SOLUTION INTRAVENOUS at 11:02

## 2017-12-26 RX ADMIN — Medication 3 MILLILITER(S): at 06:25

## 2017-12-26 RX ADMIN — LACTULOSE 15 GRAM(S): 10 SOLUTION ORAL at 21:43

## 2017-12-26 RX ADMIN — Medication 150 MILLIGRAM(S): at 06:27

## 2017-12-26 RX ADMIN — MONTELUKAST 10 MILLIGRAM(S): 4 TABLET, CHEWABLE ORAL at 17:44

## 2017-12-26 RX ADMIN — FAMOTIDINE 20 MILLIGRAM(S): 10 INJECTION INTRAVENOUS at 17:44

## 2017-12-26 RX ADMIN — Medication 150 MILLIGRAM(S): at 17:44

## 2017-12-26 RX ADMIN — Medication 8 UNIT(S): at 15:26

## 2017-12-26 RX ADMIN — Medication 100 MILLIGRAM(S): at 17:44

## 2017-12-26 RX ADMIN — Medication 3 MILLILITER(S): at 11:22

## 2017-12-26 RX ADMIN — SODIUM CHLORIDE 4 MILLILITER(S): 9 INJECTION INTRAMUSCULAR; INTRAVENOUS; SUBCUTANEOUS at 11:22

## 2017-12-26 NOTE — PROGRESS NOTE ADULT - SUBJECTIVE AND OBJECTIVE BOX
Patient is a 67y old  Female who presents with a chief complaint of SOB and epigastric discomfort (23 Dec 2017 16:31)      SUBJECTIVE / OVERNIGHT EVENTS:  Pt had ERIKA overnight. Pt today is denying any epigastric pain. Pt denies any fevers/chills, CP, palpitations, SOB, N/V/D/C, melena, BRBPR, dysuria, hematuria. Pt is NPO for planned EGD/colonoscopy    MEDICATIONS  (STANDING):  ALBUTerol/ipratropium for Nebulization 3 milliLiter(s) Nebulizer every 6 hours  ascorbic acid 500 milliGRAM(s) Oral daily  buDESOnide 160 MICROgram(s)/formoterol 4.5 MICROgram(s) Inhaler 2 Puff(s) Inhalation two times a day  cholecalciferol 5000 Unit(s) Oral daily  dextrose 5%. 1000 milliLiter(s) (50 mL/Hr) IV Continuous <Continuous>  dextrose 50% Injectable 12.5 Gram(s) IV Push once  dextrose 50% Injectable 25 Gram(s) IV Push once  dextrose 50% Injectable 25 Gram(s) IV Push once  digoxin     Tablet 0.25 milliGRAM(s) Oral daily  docusate sodium 100 milliGRAM(s) Oral daily  famotidine    Tablet 20 milliGRAM(s) Oral daily  hydrocortisone 0.5% Cream 1 Application(s) Topical two times a day  insulin glargine Injectable (LANTUS) 18 Unit(s) SubCutaneous at bedtime  insulin lispro (HumaLOG) corrective regimen sliding scale   SubCutaneous at bedtime  insulin lispro (HumaLOG) corrective regimen sliding scale   SubCutaneous three times a day before meals  insulin lispro Injectable (HumaLOG) 8 Unit(s) SubCutaneous three times a day before meals  methylPREDNISolone 4 milliGRAM(s) Oral daily  montelukast 10 milliGRAM(s) Oral daily  ondansetron   Disintegrating Tablet 4 milliGRAM(s) Oral once  pantoprazole    Tablet 40 milliGRAM(s) Oral before breakfast  polyethylene glycol 3350 17 Gram(s) Oral daily  pregabalin 150 milliGRAM(s) Oral two times a day  senna 2 Tablet(s) Oral at bedtime  sodium chloride 3%  Inhalation 4 milliLiter(s) Inhalation daily  tiotropium 18 MICROgram(s) Capsule 1 Capsule(s) Inhalation daily  trimethoprim  160 mG/sulfamethoxazole 800 mG 1 Tablet(s) Oral two times a day  valsartan 40 milliGRAM(s) Oral daily    MEDICATIONS  (PRN):  aluminum hydroxide/magnesium hydroxide/simethicone Suspension 30 milliLiter(s) Oral every 4 hours PRN Dyspepsia  dextrose Gel 1 Dose(s) Oral once PRN Blood Glucose LESS THAN 70 milliGRAM(s)/deciliter  glucagon  Injectable 1 milliGRAM(s) IntraMuscular once PRN Glucose LESS THAN 70 milligrams/deciliter      OBJECTIVE:    Vital Signs Last 24 Hrs  T(C): 36.6 (26 Dec 2017 04:39), Max: 36.7 (25 Dec 2017 14:27)  T(F): 97.9 (26 Dec 2017 04:39), Max: 98.1 (25 Dec 2017 14:27)  HR: 87 (26 Dec 2017 06:26) (63 - 89)  BP: 123/66 (26 Dec 2017 04:39) (97/65 - 123/66)  BP(mean): --  RR: 18 (26 Dec 2017 04:39) (18 - 18)  SpO2: 96% (26 Dec 2017 04:39) (95% - 99%)    CAPILLARY BLOOD GLUCOSE      POCT Blood Glucose.: 96 mg/dL (26 Dec 2017 08:24)  POCT Blood Glucose.: 129 mg/dL (25 Dec 2017 21:55)  POCT Blood Glucose.: 123 mg/dL (25 Dec 2017 18:12)  POCT Blood Glucose.: 68 mg/dL (25 Dec 2017 18:02)  POCT Blood Glucose.: 70 mg/dL (25 Dec 2017 17:45)  POCT Blood Glucose.: 68 mg/dL (25 Dec 2017 17:40)  POCT Blood Glucose.: 220 mg/dL (25 Dec 2017 12:47)  POCT Blood Glucose.: 272 mg/dL (25 Dec 2017 09:44)  POCT Blood Glucose.: 219 mg/dL (25 Dec 2017 08:42)    I&O's Summary      PHYSICAL EXAM:  GENERAL: NAD, well-developed  HEAD:  Atraumatic, Normocephalic  EYES: EOMI, PERRLA, conjunctiva and sclera clear  NECK: Supple, No JVD  CHEST/LUNG: rales in the b/l lung bases  HEART: Regular rate and rhythm; No murmurs, rubs, or gallops  ABDOMEN: Soft, Nontender, Nondistended; Bowel sounds present  EXTREMITIES:  2+ Peripheral Pulses, No clubbing, cyanosis, or edema  PSYCH: AAOx3  NEUROLOGY: non-focal  SKIN: No rashes or lesions    LABS:                        12.6   6.1   )-----------( 259      ( 26 Dec 2017 07:12 )             39.8     Auto Eosinophil # x     / Auto Eosinophil % x     / Auto Neutrophil # x     / Auto Neutrophil % x     / BANDS % x                            11.7   7.6   )-----------( 257      ( 25 Dec 2017 05:01 )             36.9     Auto Eosinophil # x     / Auto Eosinophil % x     / Auto Neutrophil # x     / Auto Neutrophil % x     / BANDS % x        12-26    138  |  97  |  11  ----------------------------<  91  3.7   |  31  |  0.82  12-25    140  |  101  |  20  ----------------------------<  260<H>  4.3   |  30  |  0.91    Ca    8.6      26 Dec 2017 07:12  Mg     2.0     12-26  Phos  4.4     12-26  TPro  6.0  /  Alb  3.2<L>  /  TBili  0.1<L>  /  DBili  x   /  AST  10  /  ALT  10  /  AlkPhos  91  12-26  TPro  6.1  /  Alb  3.3  /  TBili  0.1<L>  /  DBili  x   /  AST  10  /  ALT  8<L>  /  AlkPhos  90  12-25    PT/INR - ( 26 Dec 2017 07:13 )   PT: 11.7 sec;   INR: 1.08 ratio         PTT - ( 26 Dec 2017 07:13 )  PTT:30.0 sec            RESPIRATORY  VENT:    ABG:     VBG:     RADIOLOGY & ADDITIONAL TESTS:  (Imaging Personally Reviewed)    Consultant(s) Notes Reviewed:      Care Discussed with Consultants/Other Providers:

## 2017-12-26 NOTE — PROGRESS NOTE ADULT - PROBLEM SELECTOR PLAN 3
symbicort 160 2 bid  singulair 10 qhs  spiriva 1 puff q day  nebulizer rx q 6h  pulmonary toilet-incentive spirometry, Chest Pt-acapella/chest vest-up to 5 times per day.    maintain oxygen levels above 90%-supplemental oxygen via nasal canula-humidified    All nebulized therapy is to be administered via hand held nebulizer-(patient must gargle and spit with water after use)

## 2017-12-26 NOTE — PROVIDER CONTACT NOTE (OTHER) - SITUATION
pt states she feels shaky,  BP sitting 112/68 HR 70, standing 117/77 HR 83
Pt c/o BRB streaks from rectum while wiping herself. As per pt, she wiped herself 3 three times before BRB subsided.
Pt complaining of abd discomfort that's pushing against diaphragm, the same type of discomfort that brought her to the hospital.
Pt is hypoglycemic. Blood sugar is 68. 70 repeat.  hypoglycemia protocol to follow.

## 2017-12-26 NOTE — PROVIDER CONTACT NOTE (OTHER) - ASSESSMENT
pt looks calm, stable
No distress noted, AOx4. Rectum assessed and possible hemorrhoid noted.
Pt A/Ox4, no s/s of distress noted. hypoactive BS on auscultation. Last BM 12/25.

## 2017-12-26 NOTE — PROGRESS NOTE ADULT - PROBLEM SELECTOR PLAN 1
- Unclear if this is actually a COPD exacerbation. Patient is not truly hypoxic, and CT Chest does not show acute disease; and improved significantly while on the floor  - Per pulm, will add hypertonic saline to aid in clearance, chest PT. Will restart home spiriva and symbicort BID  - Continue Duonebs, and home dose steroids  - Patient completed a sputum culture with her OP pulmonologist and it was positive for group B strep now requiring Bactrim day 8/10  - appreciate pulm recs  - continue incentive spirometry and acapella

## 2017-12-26 NOTE — PROVIDER CONTACT NOTE (OTHER) - ACTION/TREATMENT ORDERED:
DR Torres aware.   premeal decreased to 6 units
MD aware and states GI consulted for pt. Will monitor pt for any s/s of BRB from rectum.
MD made aware. Pt was just given Lactulose PO, give it some time to work before any further recommendations.

## 2017-12-26 NOTE — PROGRESS NOTE ADULT - ATTENDING COMMENTS
as above--will add in acapella/chest pt by vest as well--no absolute pulm. contras to GI procedures  GI workup is in progress    Eulalio Allison MD-Pulmonary   [phone]

## 2017-12-26 NOTE — PROGRESS NOTE ADULT - SUBJECTIVE AND OBJECTIVE BOX
CHIEF COMPLAINT: better over all--mucusy; still abdominal pain--good BM    Interval Events: ambulating--for egd/colonoscopy today    REVIEW OF SYSTEMS:  Constitutional: No fevers or chills. No weight loss. No fatigue or generalized malaise.  Eyes: No itching or discharge from the eyes  ENT: No ear pain. No ear discharge. No nasal congestion. No post nasal drip. No epistaxis. No throat pain. No sore throat. No difficulty swallowing.   CV: No chest pain. No palpitations. No lightheadedness or dizziness.   Resp: No dyspnea at rest. + dyspnea on exertion. No orthopnea. No wheezing. + cough. No stridor. + sputum production. No chest pain with respiration.  GI: No nausea. No vomiting. No diarrhea.  MSK: No joint pain or pain in any extremities  Integumentary: No skin lesions. No pedal edema.  Neurological: No gross motor weakness. No sensory changes.  [+ ] All other systems negative  [ ] Unable to assess ROS because ________    OBJECTIVE:  ICU Vital Signs Last 24 Hrs  T(C): 36.6 (26 Dec 2017 04:39), Max: 36.7 (25 Dec 2017 14:27)  T(F): 97.9 (26 Dec 2017 04:39), Max: 98.1 (25 Dec 2017 14:27)  HR: 63 (26 Dec 2017 04:39) (63 - 89)  BP: 123/66 (26 Dec 2017 04:39) (97/65 - 123/66)  BP(mean): --  ABP: --  ABP(mean): --  RR: 18 (26 Dec 2017 04:39) (18 - 18)  SpO2: 96% (26 Dec 2017 04:39) (95% - 99%)        CAPILLARY BLOOD GLUCOSE      POCT Blood Glucose.: 129 mg/dL (25 Dec 2017 21:55)      PHYSICAL EXAM: NAD in bed  General: Awake, alert, oriented X 3.   HEENT: Atraumatic, normocephalic.                 Mallampatti Grade 3                No nasal congestion.                No tonsillar or pharyngeal exudates.  Lymph Nodes: No palpable lymphadenopathy  Neck: No JVD. No carotid bruit.   Respiratory: Normal chest expansion                         Normal percussion                         Normal and equal air entry                         ++ exp.  wheeze, and rhonchi but no rales.  Cardiovascular: S1 S2 normal. + murmurs,no rubs or gallops.   Abdomen: Soft, non-tender, non-distended. No organomegaly.  Extremities: Warm to touch. Peripheral pulse palpable. No pedal edema.   Skin: No rashes or skin lesions  Neurological: Motor and sensory examination equal and normal in all four extremities.  Psychiatry: Appropriate mood and affect.    HOSPITAL MEDICATIONS:  MEDICATIONS  (STANDING):  ALBUTerol/ipratropium for Nebulization 3 milliLiter(s) Nebulizer every 6 hours  ascorbic acid 500 milliGRAM(s) Oral daily  buDESOnide 160 MICROgram(s)/formoterol 4.5 MICROgram(s) Inhaler 2 Puff(s) Inhalation two times a day  cholecalciferol 5000 Unit(s) Oral daily  dextrose 5%. 1000 milliLiter(s) (50 mL/Hr) IV Continuous <Continuous>  dextrose 50% Injectable 12.5 Gram(s) IV Push once  dextrose 50% Injectable 25 Gram(s) IV Push once  dextrose 50% Injectable 25 Gram(s) IV Push once  digoxin     Tablet 0.25 milliGRAM(s) Oral daily  docusate sodium 100 milliGRAM(s) Oral daily  famotidine    Tablet 20 milliGRAM(s) Oral daily  hydrocortisone 0.5% Cream 1 Application(s) Topical two times a day  insulin glargine Injectable (LANTUS) 18 Unit(s) SubCutaneous at bedtime  insulin lispro (HumaLOG) corrective regimen sliding scale   SubCutaneous at bedtime  insulin lispro (HumaLOG) corrective regimen sliding scale   SubCutaneous three times a day before meals  insulin lispro Injectable (HumaLOG) 8 Unit(s) SubCutaneous three times a day before meals  methylPREDNISolone 4 milliGRAM(s) Oral daily  montelukast 10 milliGRAM(s) Oral daily  ondansetron   Disintegrating Tablet 4 milliGRAM(s) Oral once  pantoprazole    Tablet 40 milliGRAM(s) Oral before breakfast  polyethylene glycol 3350 17 Gram(s) Oral daily  pregabalin 150 milliGRAM(s) Oral two times a day  senna 2 Tablet(s) Oral at bedtime  sodium chloride 3%  Inhalation 4 milliLiter(s) Inhalation daily  tiotropium 18 MICROgram(s) Capsule 1 Capsule(s) Inhalation daily  trimethoprim  160 mG/sulfamethoxazole 800 mG 1 Tablet(s) Oral two times a day  valsartan 40 milliGRAM(s) Oral daily    MEDICATIONS  (PRN):  aluminum hydroxide/magnesium hydroxide/simethicone Suspension 30 milliLiter(s) Oral every 4 hours PRN Dyspepsia  dextrose Gel 1 Dose(s) Oral once PRN Blood Glucose LESS THAN 70 milliGRAM(s)/deciliter  glucagon  Injectable 1 milliGRAM(s) IntraMuscular once PRN Glucose LESS THAN 70 milligrams/deciliter      LABS:                        11.7   7.6   )-----------( 257      ( 25 Dec 2017 05:01 )             36.9     12-25    140  |  101  |  20  ----------------------------<  260<H>  4.3   |  30  |  0.91    Ca    8.8      25 Dec 2017 05:01  Phos  3.0     12-25  Mg     2.0     12-25    TPro  6.1  /  Alb  3.3  /  TBili  0.1<L>  /  DBili  x   /  AST  10  /  ALT  8<L>  /  AlkPhos  90  12-25    PT/INR - ( 25 Dec 2017 05:00 )   PT: 11.6 sec;   INR: 1.06 ratio         PTT - ( 25 Dec 2017 05:00 )  PTT:43.4 sec          MICROBIOLOGY:     RADIOLOGY:  [ ] Reviewed and interpreted by me    Point of Care Ultrasound Findings:    PFT:    EKG:

## 2017-12-26 NOTE — PROGRESS NOTE ADULT - PROBLEM SELECTOR PLAN 2
- given patient's CT findings of a pancreatic lesion that was seen in a prior CT in 2016, may need MRI OP   - Diabetic gastroparesis also a possibility; trial of Reglan started however patient did not like medication and is declining medication at this time  - CA 19-9 and CEA WNL  - c/w Pepcid and maalox  - Pt currently NPO for planned for EGD/colonoscopy tomorrow for possible ulcer vs less likely malignancy

## 2017-12-26 NOTE — PROGRESS NOTE ADULT - PROBLEM SELECTOR PLAN 7
- FS persistently in the low 200s  - continue lantus 18u   - will hold Humalog 8/8/8 U as pt is NPO. cont mod dose ISS  - FS TID and at bedtime

## 2017-12-26 NOTE — PROGRESS NOTE ADULT - ASSESSMENT
67 yr F with PMH of COPD/asthma, tracheobronchomalacia s/p tracheo-bronchoplasty in 10/16, Afib on Eliquis , Adrenal Insufficieny on steroids,  DM2, HTN, Squamous cell CA of the anus on chemo/XRT, now admitted with transient episode of shortness of breath that occurred after eating.    1) SOB  - feeling ok today, no further episodes of SOB   - symptoms may have been triggered by gastroparesis as her symptoms occurred when she ate- recommend GI eval   - CT chest does show some mild tree in bud likely due to mucus plugging and combined with increased cough and change in sputum color it is reasonable to treat for tracheobronchitis - pt is currently on bactrim and should complete a 7 day course  - add nebulized 3% hypertonic saline (to be given after duonebs) to help with clearance  - Chest PT, incentive spirometry, OOB as tolerated   - keep O2 sats >92%  - check O2 sat on RA with ambulation     2) Asthma  - does not appear to have an asthma exacerbation at this time   - can decrease steroids to home dose of medrol  - please restart spiriva and symbicort bid

## 2017-12-26 NOTE — PROGRESS NOTE ADULT - PROBLEM SELECTOR PLAN 3
- pt has a hx of rectal CA s/p chemoRx and RT, in remission as per pt. Follows Dr. Maxwell ([facility]) who told the patient that there is still some tumor burden which should not be biopsied due to risk of infection.   - will contact Dr. Maxwell tomorrow before colonoscopy  - plan for PET as outpatient

## 2017-12-27 DIAGNOSIS — K56.609 UNSPECIFIED INTESTINAL OBSTRUCTION, UNSPECIFIED AS TO PARTIAL VERSUS COMPLETE OBSTRUCTION: ICD-10-CM

## 2017-12-27 LAB
ANION GAP SERPL CALC-SCNC: 10 MMOL/L — SIGNIFICANT CHANGE UP (ref 5–17)
BUN SERPL-MCNC: 10 MG/DL — SIGNIFICANT CHANGE UP (ref 7–23)
CALCIUM SERPL-MCNC: 8.6 MG/DL — SIGNIFICANT CHANGE UP (ref 8.4–10.5)
CHLORIDE SERPL-SCNC: 103 MMOL/L — SIGNIFICANT CHANGE UP (ref 96–108)
CO2 SERPL-SCNC: 30 MMOL/L — SIGNIFICANT CHANGE UP (ref 22–31)
CREAT SERPL-MCNC: 0.77 MG/DL — SIGNIFICANT CHANGE UP (ref 0.5–1.3)
GLUCOSE BLDC GLUCOMTR-MCNC: 113 MG/DL — HIGH (ref 70–99)
GLUCOSE BLDC GLUCOMTR-MCNC: 176 MG/DL — HIGH (ref 70–99)
GLUCOSE BLDC GLUCOMTR-MCNC: 194 MG/DL — HIGH (ref 70–99)
GLUCOSE BLDC GLUCOMTR-MCNC: 275 MG/DL — HIGH (ref 70–99)
GLUCOSE SERPL-MCNC: 224 MG/DL — HIGH (ref 70–99)
H PYLORI AG STL QL: NEGATIVE — SIGNIFICANT CHANGE UP
HCT VFR BLD CALC: 35.8 % — SIGNIFICANT CHANGE UP (ref 34.5–45)
HCT VFR BLD CALC: 39.7 % — SIGNIFICANT CHANGE UP (ref 34.5–45)
HGB BLD-MCNC: 11.4 G/DL — LOW (ref 11.5–15.5)
HGB BLD-MCNC: 12.5 G/DL — SIGNIFICANT CHANGE UP (ref 11.5–15.5)
MAGNESIUM SERPL-MCNC: 2.2 MG/DL — SIGNIFICANT CHANGE UP (ref 1.6–2.6)
MCHC RBC-ENTMCNC: 24.6 PG — LOW (ref 27–34)
MCHC RBC-ENTMCNC: 25 PG — LOW (ref 27–34)
MCHC RBC-ENTMCNC: 31.4 GM/DL — LOW (ref 32–36)
MCHC RBC-ENTMCNC: 32 GM/DL — SIGNIFICANT CHANGE UP (ref 32–36)
MCV RBC AUTO: 78.1 FL — LOW (ref 80–100)
MCV RBC AUTO: 78.3 FL — LOW (ref 80–100)
PHOSPHATE SERPL-MCNC: 3.4 MG/DL — SIGNIFICANT CHANGE UP (ref 2.5–4.5)
PLATELET # BLD AUTO: 237 K/UL — SIGNIFICANT CHANGE UP (ref 150–400)
PLATELET # BLD AUTO: 245 K/UL — SIGNIFICANT CHANGE UP (ref 150–400)
POTASSIUM SERPL-MCNC: 4.1 MMOL/L — SIGNIFICANT CHANGE UP (ref 3.5–5.3)
POTASSIUM SERPL-SCNC: 4.1 MMOL/L — SIGNIFICANT CHANGE UP (ref 3.5–5.3)
RBC # BLD: 4.58 M/UL — SIGNIFICANT CHANGE UP (ref 3.8–5.2)
RBC # BLD: 5.07 M/UL — SIGNIFICANT CHANGE UP (ref 3.8–5.2)
RBC # FLD: 13.9 % — SIGNIFICANT CHANGE UP (ref 10.3–14.5)
RBC # FLD: 14.1 % — SIGNIFICANT CHANGE UP (ref 10.3–14.5)
SODIUM SERPL-SCNC: 143 MMOL/L — SIGNIFICANT CHANGE UP (ref 135–145)
WBC # BLD: 5.1 K/UL — SIGNIFICANT CHANGE UP (ref 3.8–10.5)
WBC # BLD: 6.1 K/UL — SIGNIFICANT CHANGE UP (ref 3.8–10.5)
WBC # FLD AUTO: 5.1 K/UL — SIGNIFICANT CHANGE UP (ref 3.8–10.5)
WBC # FLD AUTO: 6.1 K/UL — SIGNIFICANT CHANGE UP (ref 3.8–10.5)

## 2017-12-27 PROCEDURE — 99233 SBSQ HOSP IP/OBS HIGH 50: CPT

## 2017-12-27 PROCEDURE — 99233 SBSQ HOSP IP/OBS HIGH 50: CPT | Mod: GC

## 2017-12-27 PROCEDURE — 74000: CPT | Mod: 26

## 2017-12-27 RX ORDER — SIMETHICONE 80 MG/1
80 TABLET, CHEWABLE ORAL ONCE
Qty: 0 | Refills: 0 | Status: COMPLETED | OUTPATIENT
Start: 2017-12-27 | End: 2017-12-27

## 2017-12-27 RX ORDER — SODIUM CHLORIDE 9 MG/ML
1000 INJECTION INTRAMUSCULAR; INTRAVENOUS; SUBCUTANEOUS
Qty: 0 | Refills: 0 | Status: DISCONTINUED | OUTPATIENT
Start: 2017-12-27 | End: 2017-12-29

## 2017-12-27 RX ADMIN — FAMOTIDINE 20 MILLIGRAM(S): 10 INJECTION INTRAVENOUS at 12:35

## 2017-12-27 RX ADMIN — BUDESONIDE AND FORMOTEROL FUMARATE DIHYDRATE 2 PUFF(S): 160; 4.5 AEROSOL RESPIRATORY (INHALATION) at 17:18

## 2017-12-27 RX ADMIN — Medication 500 MILLIGRAM(S): at 12:37

## 2017-12-27 RX ADMIN — SODIUM CHLORIDE 4 MILLILITER(S): 9 INJECTION INTRAMUSCULAR; INTRAVENOUS; SUBCUTANEOUS at 11:50

## 2017-12-27 RX ADMIN — Medication 0.25 MILLIGRAM(S): at 06:03

## 2017-12-27 RX ADMIN — Medication 1 TABLET(S): at 18:10

## 2017-12-27 RX ADMIN — TIOTROPIUM BROMIDE 1 CAPSULE(S): 18 CAPSULE ORAL; RESPIRATORY (INHALATION) at 11:51

## 2017-12-27 RX ADMIN — SIMETHICONE 80 MILLIGRAM(S): 80 TABLET, CHEWABLE ORAL at 21:17

## 2017-12-27 RX ADMIN — Medication 100 MILLIGRAM(S): at 12:36

## 2017-12-27 RX ADMIN — Medication 3 MILLILITER(S): at 11:50

## 2017-12-27 RX ADMIN — Medication 3 MILLILITER(S): at 17:18

## 2017-12-27 RX ADMIN — Medication 5000 UNIT(S): at 12:35

## 2017-12-27 RX ADMIN — Medication 1 APPLICATION(S): at 18:11

## 2017-12-27 RX ADMIN — Medication 1 APPLICATION(S): at 06:03

## 2017-12-27 RX ADMIN — Medication 6: at 18:09

## 2017-12-27 RX ADMIN — SIMETHICONE 80 MILLIGRAM(S): 80 TABLET, CHEWABLE ORAL at 01:10

## 2017-12-27 RX ADMIN — PANTOPRAZOLE SODIUM 40 MILLIGRAM(S): 20 TABLET, DELAYED RELEASE ORAL at 06:03

## 2017-12-27 RX ADMIN — Medication 1 TABLET(S): at 06:02

## 2017-12-27 RX ADMIN — Medication 150 MILLIGRAM(S): at 18:14

## 2017-12-27 RX ADMIN — Medication 30 MILLILITER(S): at 01:10

## 2017-12-27 RX ADMIN — Medication 150 MILLIGRAM(S): at 06:03

## 2017-12-27 RX ADMIN — Medication 3 MILLILITER(S): at 05:49

## 2017-12-27 RX ADMIN — POLYETHYLENE GLYCOL 3350 17 GRAM(S): 17 POWDER, FOR SOLUTION ORAL at 12:36

## 2017-12-27 RX ADMIN — Medication 4 MILLIGRAM(S): at 06:03

## 2017-12-27 RX ADMIN — MONTELUKAST 10 MILLIGRAM(S): 4 TABLET, CHEWABLE ORAL at 12:34

## 2017-12-27 RX ADMIN — Medication 8 UNIT(S): at 18:09

## 2017-12-27 RX ADMIN — SENNA PLUS 2 TABLET(S): 8.6 TABLET ORAL at 21:17

## 2017-12-27 RX ADMIN — VALSARTAN 40 MILLIGRAM(S): 80 TABLET ORAL at 06:02

## 2017-12-27 RX ADMIN — INSULIN GLARGINE 18 UNIT(S): 100 INJECTION, SOLUTION SUBCUTANEOUS at 22:03

## 2017-12-27 RX ADMIN — Medication 2: at 09:40

## 2017-12-27 RX ADMIN — SODIUM CHLORIDE 75 MILLILITER(S): 9 INJECTION INTRAMUSCULAR; INTRAVENOUS; SUBCUTANEOUS at 01:11

## 2017-12-27 RX ADMIN — BUDESONIDE AND FORMOTEROL FUMARATE DIHYDRATE 2 PUFF(S): 160; 4.5 AEROSOL RESPIRATORY (INHALATION) at 05:48

## 2017-12-27 NOTE — PROGRESS NOTE ADULT - ATTENDING COMMENTS
as above--will add in acapella/chest pt by vest as well--no absolute pulm. contras to GI procedures  GI workup is in progress--await path of egd, formal GI evaluation wanted by pt.    Eulalio Allison MD-Pulmonary   [phone]

## 2017-12-27 NOTE — DIETITIAN INITIAL EVALUATION ADULT. - NS AS NUTRI INTERV ED CONTENT
Nutrition relationship to health/disease/Recommended modifications/1) reinforced recommended consistent carbohydrate and low sodium dietary modifications. Encouraged PO intake and calorically dense foods. Pt refused written materials on dietary modifications but requested a list of high protein/calorie snacks low in carbohydrates.

## 2017-12-27 NOTE — DIETITIAN INITIAL EVALUATION ADULT. - ENERGY NEEDS
Height (in): 67 (12-21) Weight (pounds): 162.4 (12-21) BMI (kg/m2): 25.5 (12-21) IBW: 135 pounds %IBW: 120%  No edema noted; no pressure ulcers; skin intact.  Pt is 67 year old female with PMHx of COPD, asthma, tracheobronchomalacia, afib, HTN, adrenal insufficiency (on chronic medrol), T2DM, aortic disease and aortic valve insufficiency. Remote Hx of DVT and colorectal cancer (s/p chemo and RT 7/2017). Presents with SOB and epigastric discomfort. Height (in): 67 (12/21) Weight (pounds): 162.4 (12/21) BMI (kg/m2): 25.5 (12/21) IBW: 135 pounds %IBW: 120%  No edema noted; no pressure ulcers; skin intact.  Pt is 67 year old female with PMHx of COPD, asthma, tracheobronchomalacia, afib, HTN, adrenal insufficiency (on chronic medrol), T2DM, aortic disease and aortic valve insufficiency. Remote Hx of DVT and colorectal cancer (s/p chemo and RT 7/2017). Presents with SOB and epigastric discomfort.  s/p EGD and colonoscopy yesterday, possible LBO.

## 2017-12-27 NOTE — DIETITIAN INITIAL EVALUATION ADULT. - NS FNS REASON FOR WEIGHT CHANG
other (specify)/Possibly related to current medical diagnoses of cancer and COPD other (specify)/possibly related to current medical conditions

## 2017-12-27 NOTE — PROGRESS NOTE ADULT - PROBLEM SELECTOR PLAN 4
- pt with hx of rectal CA s/p chemoRx and RT, in remission as per pt. Follows Dr. Maxwell ([facility])   - planned for PET as outpatient

## 2017-12-27 NOTE — PROGRESS NOTE ADULT - PROBLEM SELECTOR PLAN 2
- given patient's CT findings of a pancreatic lesion that was seen in a prior CT in 2016, may need MRI OP   - Diabetic gastroparesis also a possibility; trial of Reglan started however patient did not like medication and is declining medication at this time  - c/w Pepcid and maalox  - plaques seen in esophagus on EGD which were biopsied - given patient's CT findings of a pancreatic cyst that was seen in a prior CT in 2016 which are unchanged, she will need MRI outpatient for monitoring.   - Diabetic gastroparesis also a possibility; trial of Reglan started however patient did not like medication and is declining medication at this time  - c/w Pepcid and maalox  - plaques seen in esophagus on EGD which were biopsied

## 2017-12-27 NOTE — PROGRESS NOTE ADULT - PROBLEM SELECTOR PLAN 8
- FS WNL  - continue lantus 18u   - will hold Humalog 8/8/8 U as pt is NPO. cont mod dose ISS  - FS TID and at bedtime - FS acceptable   - continue lantus 18u   - will hold Humalog 8/8/8 U as pt is NPO. cont mod dose ISS  - FS TID and at bedtime

## 2017-12-27 NOTE — PROGRESS NOTE ADULT - PROBLEM SELECTOR PLAN 1
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   - will continue to monitor with serial abdominal exams  - t/c cbc q12  - c/w NPO - pt with no gas passed in the last 3 days with partial LBO seen on prelim abd xray read, final read of X-ray was c/w non-obstructive bowel gas pattern. Large bowel obstruction was ruled out.  - pt with normal physical exam (normal bowel sounds, abdomen non distended and not firm).   - will continue to monitor with serial abdominal exams  - t/c cbc q12  - will advance diet as tolerated

## 2017-12-27 NOTE — DIETITIAN INITIAL EVALUATION ADULT. - OTHER INFO
Pt seen for consult: A1c=8.0. Pt reports good appetite and intake in house and PTA. As per documentation, pt is consuming % meals. Pt adheres to modified consistent carbohydrate & low sodium diet PTA; pt likes to carb count, monitors blood glucose levels, avoids high starch foods, and pairs carbohydrates with proteins; typical diet is reported to be high in fruits, vegetables and proteins; consists of eggs, toast apple and coffee for breakfast, cold-cut sandwich for lunch, and chicken with plantains, or rice and peas for dinner; pt has fruits as snacks in between meals and reports consuming plain cold-cuts for before bed if blood sugars are high. Pt reports some wt loss with c/o of clothes fitting loosely but reports stable wt of 165pounds in the last month. Pt states UBW is 165pounds; current wm=171.4 pounds; as per previous RD note on (12/01), pt is=[id number]. Consistent carbohydrate diet education reinforced; discussed how fruits and plantains are carbohydrates; discussed possible snacks to pair with protein other than fruit. Encouraged PO intake and calorically dense foods. Pt denies N/V/D, c/o constipation for unknown period of time. c/o slight swallowing difficulty and states food gets stuck sometimes but resolves with water. Pt has allergy to shellfish. Pt seen for consult: A1c=8.0. Pt reports good appetite and intake in house and PTA. As per documentation, pt is consuming % meals. Typical diet is reported to be high in fruits, vegetables and proteins; consists of eggs, toast apple and coffee for breakfast, cold-cut sandwich for lunch, and chicken with plantains, or rice and peas for dinner; pt has fruits as snacks in between meals and reports consuming plain cold-cuts for before bed if blood sugars are high. Pt reports some wt loss with c/o of clothes fitting loosely but reports stable wt of 165pounds in the last month. Pt states UBW is 165pounds; current ly=566.4 pounds; as per previous RD note on (12/01), pt [id number]. Consistent carbohydrate diet education reinforced; discussed how fruits and plantains are carbohydrates; discussed possible snacks to pair with protein other than fruit. Encouraged PO intake and calorically dense foods. Pt denies N/V/D, c/o constipation for unknown period of time. c/o slight swallowing difficulty and states food gets stuck sometimes but resolves with water. Pt has allergy to shellfish. Pt seen for consult: A1c=8.0. Pt reports good appetite and intake in house and PTA. As per documentation, pt is consuming % meals. Typical diet PTA is reported to be high in fruits, vegetables and proteins; consists of eggs, toast apple and coffee for breakfast, cold-cut sandwich for lunch, and chicken with plantains, or rice and peas for dinner; pt has fruits as snacks in between meals and reports consuming plain cold-cuts for before bed if blood sugars are high. Pt reports some wt loss with c/o of clothes fitting loosely but reports stable wt of 165pounds in the last month. Pt states UBW is 165pounds; current sl=594.4 pounds; as per previous RD note on (12/01), pt wk=[id number]. Consistent carbohydrate diet education reinforced. Encouraged PO intake and calorically dense foods. Pt denies N/V/D, c/o constipation for unknown period of time. c/o slight swallowing difficulty and states food gets stuck sometimes but resolves with water; pt declined softer diet, will discuss with team. Pt has allergy to shellfish.

## 2017-12-27 NOTE — DIETITIAN INITIAL EVALUATION ADULT. - PROBLEM SELECTOR PLAN 1
- Unclear if this is actually a COPD exacerbation. Patient is not truly hypoxic, and CT Chest does not show acute disease  - Continue Duonebs, Prednisone 40 for 5 days  - RVP negative.  - No indication for antibiotics at this time.  - Other etiologies can include a PE. Patient allergic to iodine. ED DVT study negative Official DVT pending. VQ scan ordered since patient has hx of anaphylaxis to contrast.

## 2017-12-27 NOTE — DIETITIAN INITIAL EVALUATION ADULT. - NS AS NUTRI INTERV MEALS SNACK
1) Continue on full liquid diet 2) as medically feasible, advance to consistent carbohydrate with evening snack and low sodium diet 3) encourage PO intake

## 2017-12-27 NOTE — DIETITIAN INITIAL EVALUATION ADULT. - SOURCE
other (specify)/Medical chart reviewed/patient other (specify)/Medical chart reviewed, previous RD note (02/04/17)/patient

## 2017-12-27 NOTE — PROGRESS NOTE ADULT - SUBJECTIVE AND OBJECTIVE BOX
Patient is a 67y old  Female who presents with a chief complaint of SOB and epigastric discomfort (23 Dec 2017 16:31)      SUBJECTIVE / OVERNIGHT EVENTS:  Overnight, pt complaining of epigastric pain and constipation with no passing of gas over the last 3 days. Pt s/p EGD and colonoscopy yesterday. Pt receievd lactulose and a tap water enema with no results. an abdominal x-ray was obtained. Prelim read revealed a possible LBO. Pt was made NPO at that time. Pt currently reports that there is no more epigastric pain. Pt denies any fevers/chills, CP, palpitations, SOB, N/V/D/C, melena, BRBPR, dysuria, hematuria.    MEDICATIONS  (STANDING):  ALBUTerol/ipratropium for Nebulization 3 milliLiter(s) Nebulizer every 6 hours  ascorbic acid 500 milliGRAM(s) Oral daily  buDESOnide 160 MICROgram(s)/formoterol 4.5 MICROgram(s) Inhaler 2 Puff(s) Inhalation two times a day  cholecalciferol 5000 Unit(s) Oral daily  dextrose 5%. 1000 milliLiter(s) (50 mL/Hr) IV Continuous <Continuous>  dextrose 50% Injectable 12.5 Gram(s) IV Push once  dextrose 50% Injectable 25 Gram(s) IV Push once  dextrose 50% Injectable 25 Gram(s) IV Push once  digoxin     Tablet 0.25 milliGRAM(s) Oral daily  docusate sodium 100 milliGRAM(s) Oral daily  famotidine    Tablet 20 milliGRAM(s) Oral daily  hydrocortisone 0.5% Cream 1 Application(s) Topical two times a day  insulin glargine Injectable (LANTUS) 18 Unit(s) SubCutaneous at bedtime  insulin lispro (HumaLOG) corrective regimen sliding scale   SubCutaneous at bedtime  insulin lispro (HumaLOG) corrective regimen sliding scale   SubCutaneous three times a day before meals  insulin lispro Injectable (HumaLOG) 8 Unit(s) SubCutaneous three times a day before meals  methylPREDNISolone 4 milliGRAM(s) Oral daily  montelukast 10 milliGRAM(s) Oral daily  ondansetron   Disintegrating Tablet 4 milliGRAM(s) Oral once  pantoprazole    Tablet 40 milliGRAM(s) Oral before breakfast  polyethylene glycol 3350 17 Gram(s) Oral daily  pregabalin 150 milliGRAM(s) Oral two times a day  senna 2 Tablet(s) Oral at bedtime  sodium chloride 0.9%. 1000 milliLiter(s) (75 mL/Hr) IV Continuous <Continuous>  sodium chloride 3%  Inhalation 4 milliLiter(s) Inhalation daily  tiotropium 18 MICROgram(s) Capsule 1 Capsule(s) Inhalation daily  trimethoprim  160 mG/sulfamethoxazole 800 mG 1 Tablet(s) Oral two times a day  valsartan 40 milliGRAM(s) Oral daily    MEDICATIONS  (PRN):  aluminum hydroxide/magnesium hydroxide/simethicone Suspension 30 milliLiter(s) Oral every 4 hours PRN Dyspepsia  dextrose Gel 1 Dose(s) Oral once PRN Blood Glucose LESS THAN 70 milliGRAM(s)/deciliter  glucagon  Injectable 1 milliGRAM(s) IntraMuscular once PRN Glucose LESS THAN 70 milligrams/deciliter      OBJECTIVE:    Vital Signs Last 24 Hrs  T(C): 36.6 (27 Dec 2017 04:42), Max: 36.6 (26 Dec 2017 21:27)  T(F): 97.9 (27 Dec 2017 04:42), Max: 97.9 (26 Dec 2017 21:27)  HR: 67 (27 Dec 2017 04:42) (63 - 85)  BP: 118/69 (27 Dec 2017 04:42) (112/68 - 123/73)  BP(mean): --  RR: 18 (27 Dec 2017 04:42) (18 - 18)  SpO2: 98% (27 Dec 2017 04:42) (98% - 99%)    CAPILLARY BLOOD GLUCOSE      POCT Blood Glucose.: 214 mg/dL (26 Dec 2017 21:43)  POCT Blood Glucose.: 189 mg/dL (26 Dec 2017 18:04)  POCT Blood Glucose.: 150 mg/dL (26 Dec 2017 15:16)  POCT Blood Glucose.: 158 mg/dL (26 Dec 2017 10:28)  POCT Blood Glucose.: 96 mg/dL (26 Dec 2017 08:24)    I&O's Summary    26 Dec 2017 07:01  -  27 Dec 2017 07:00  --------------------------------------------------------  IN: 0 mL / OUT: 300 mL / NET: -300 mL        PHYSICAL EXAM:  GENERAL: NAD, well-developed  HEAD:  Atraumatic, Normocephalic  EYES: EOMI, PERRLA, conjunctiva and sclera clear  NECK: Supple, No JVD  CHEST/LUNG: rales in the b/l lung bases with prominent wheezes in the left lung field  HEART: Regular rate and rhythm; No murmurs, rubs, or gallops  ABDOMEN: Soft, Nontender, Nondistended; Bowel sounds present  EXTREMITIES:  2+ Peripheral Pulses, No clubbing, cyanosis, or edema  PSYCH: AAOx3  NEUROLOGY: non-focal  SKIN: No rashes or lesions    LABS:                        11.4   5.1   )-----------( 237      ( 27 Dec 2017 06:45 )             35.8     Auto Eosinophil # x     / Auto Eosinophil % x     / Auto Neutrophil # x     / Auto Neutrophil % x     / BANDS % x                            12.6   6.1   )-----------( 259      ( 26 Dec 2017 07:12 )             39.8     Auto Eosinophil # x     / Auto Eosinophil % x     / Auto Neutrophil # x     / Auto Neutrophil % x     / BANDS % x        12-27    143  |  103  |  10  ----------------------------<  224<H>  4.1   |  30  |  0.77  12-26    138  |  97  |  11  ----------------------------<  91  3.7   |  31  |  0.82    Ca    8.6      27 Dec 2017 06:45  Mg     2.2     12-27  Phos  3.4     12-27  TPro  6.0  /  Alb  3.2<L>  /  TBili  0.1<L>  /  DBili  x   /  AST  10  /  ALT  10  /  AlkPhos  91  12-26    PT/INR - ( 26 Dec 2017 07:13 )   PT: 11.7 sec;   INR: 1.08 ratio         PTT - ( 26 Dec 2017 07:13 )  PTT:30.0 sec            RESPIRATORY  VENT:    ABG:     VBG:     RADIOLOGY & ADDITIONAL TESTS:  (Imaging Personally Reviewed)    Consultant(s) Notes Reviewed:      Care Discussed with Consultants/Other Providers:

## 2017-12-27 NOTE — DIETITIAN INITIAL EVALUATION ADULT. - PERTINENT MEDS FT
NS, D5, Humalog ss, Colace, miralax, senna, medrol, Maalox, Vitamin C, Vitamin D3, Lanoxin, Pepcid, zofran ODT, Protonix DR, Diovan NS@75cc/Hr, Humalog sliding scale, Humalog premeal, Lantus, Colace, miralax, senna, medrol, Maalox, Vitamin C, Vitamin D3, Lanoxin, Pepcid, zofran ODT, Protonix DR, Diovan

## 2017-12-27 NOTE — CHART NOTE - NSCHARTNOTEFT_GEN_A_CORE
Called to bedside for epigastric discomfort and had an extensive conversation with patient. She hasn't had a BM all day and the last one was last night before her colonoscopy (which she had two with the [facility]). She hasn't passed gas also for the last few days. No nausea, vomiting, fever, abdominal pain but does feel distended. She had her EGD (plaques in the esophagus, possible H. pylori?) and Colonoscopy (unable to assess due to stool) and patient had concerns that she wasn't able to fully have the colonoscopy and why she wasn't given an enema last night. I explained to the patient as I did last night that enemas aren't necessarily indicated for colonoscopy preps and can be harmful with side effects (perforation if there is bowel obstruction or drop in BP). She understood that last night but still had concerns tonight about it too. Given she still hasn't had a BM today, decided to try lactulose and a tap water enema along with her usual aggressive bowel regimen of colace, miralax, senna x2. Also, concerning for her PMH of anal cancer that there is a possible obstruction that might be a cause of this. Abdominal X-ray ordered showed as per prelim read from the radiologist to be a partial LBO with a lot of dilation w/ air to 6-8cm but not a lot of stool. Per these new results, decided to place patient back on NPO and fluid management for now and symptomatic treatment with simethicone and maalox for now. No NG tube indicated at this time as she isn't having any nausea or vomiting and rectal tube couldn't be placed with the history of anal cancer and nursing staff indicating it is a contraindication. Will continue to monitor and told the patient if she starts having acute abdominal pain, nausea, vomiting to let the nurse know and a NG tube would need to be placed and surgery possibly called with another repeat Ab x-ray. She had prior bowel obstruction in the past years ago with NGT placement a/w nausea and vomiting at that time. Called to bedside for epigastric discomfort and had an extensive conversation with patient. She hasn't had a BM all day and the last one was last night before her colonoscopy (which she had two with the Movip[facility]). She hasn't passed gas also for the last few days. No nausea, vomiting, fever, abdominal pain but does feel distended. She had her EGD (plaques in the esophagus) and Colonoscopy (unable to assess due to stool) and patient had concerns that she wasn't able to fully have the colonoscopy and why she wasn't given an enema last night. I explained to the patient as I did last night that enemas aren't necessarily indicated for colonoscopy preps and can be harmful with side effects (perforation if there is bowel obstruction or drop in BP). She understood that last night but still had concerns tonight about it too. Given she still hasn't had a BM today, decided to try lactulose and a tap water enema along with her usual aggressive bowel regimen of colace, miralax, senna x2. Also, concerning for her PMH of anal cancer that there is a possible obstruction that might be a cause of this. Abdominal X-ray ordered showed as per prelim read from the radiologist to be a partial LBO with a lot of dilation w/ air to 6-8cm but not a lot of stool. On rectal exam, no stool was present and patient refused manual disimpaction. Per these new results, decided to place patient back on NPO and fluid management for now and symptomatic treatment with simethicone and maalox for now. No NG tube indicated at this time as she isn't having any nausea or vomiting and rectal tube couldn't be placed with the history of anal cancer and nursing staff indicating it is a contraindication. Will continue to monitor and told the patient if she starts having acute abdominal pain, nausea, vomiting to let the nurse know and a NG tube would need to be placed and surgery possibly called with another repeat Ab x-ray. She had prior bowel obstruction in the past years ago with NGT placement a/w nausea and vomiting at that time. Called to bedside for epigastric discomfort and had an extensive conversation with patient. She hasn't had a BM all day and the last one was last night before her colonoscopy (which she had two with the Moviprep). She hasn't passed gas also for the last few days. No nausea, vomiting, fever, abdominal pain but does feel distended. She had her EGD (plaques in the esophagus) and Colonoscopy (unable to assess due to stool) and patient had concerns that she wasn't able to fully have the colonoscopy and why she wasn't given an enema last night. I explained to the patient as I did last night that enemas aren't necessarily indicated for colonoscopy preps and can be harmful with side effects (perforation if there is bowel obstruction or drop in BP). She understood that last night but still had concerns tonight about it too. Given she still hasn't had a BM today, decided to try lactulose and a tap water enema along with her usual aggressive bowel regimen of colace, miralax, senna x2. Also, concerning for her PMH of anal cancer that there is a possible obstruction that might be a cause of this. Abdominal X-ray ordered showed as per prelim read from the radiologist to be a partial LBO with a lot of dilation w/ air to 6-8cm but not a lot of stool. On rectal exam, no stool was present and patient refused manual disimpaction. Per these new results, decided to place patient back on NPO and fluid management for now and symptomatic treatment with simethicone and maalox for now. No NG tube indicated at this time as she isn't having any nausea or vomiting and rectal tube couldn't be placed with the history of anal cancer and nursing staff indicating it is a contraindication. Will continue to monitor and told the patient if she starts having acute abdominal pain, nausea, vomiting to let the nurse know and a NG tube would need to be placed and surgery possibly called with another repeat Ab x-ray. She had prior bowel obstruction in the past years ago with NGT placement a/w nausea and vomiting at that time. Prior hip surgery and hysterectomy, differential of LBO vs Pseudoobstruction (Swati syndrome) vs Anal mets mechanical obstruction vs Colon cancer obstruction Called to bedside for epigastric discomfort and had an extensive conversation with patient. She hasn't had a BM all day and the last one was last night before her colonoscopy (which she had two with the Movip[facility]). She hasn't passed gas also for the last few days. No nausea, vomiting, fever, abdominal pain but does feel distended. She had her EGD (plaques in the esophagus) and Colonoscopy (unable to assess due to stool) and patient had concerns that she wasn't able to fully have the colonoscopy and why she wasn't given an enema last night. I explained to the patient as I did last night that enemas aren't necessarily indicated for colonoscopy preps and can be harmful with side effects (perforation if there is bowel obstruction or drop in BP). She understood that last night but still had concerns tonight about it too. Given she still hasn't had a BM today, decided to try lactulose and a tap water enema along with her usual aggressive bowel regimen of colace, miralax, senna x2. Also, concerning for her PMH of anal cancer that there is a possible obstruction that might be a cause of this. Abdominal X-ray ordered showed as per prelim read from the radiologist to be a partial LBO with a lot of dilation w/ air to 6-8cm but not a lot of stool. On rectal exam, no stool was present and patient refused manual disimpaction. Per these new results, decided to place patient back on NPO (had been NPO prior before today) and fluid management for now and symptomatic treatment with simethicone and maalox for now. No NG tube indicated at this time as she isn't having any nausea or vomiting and rectal tube couldn't be placed with the history of anal cancer s/p RT and in remission but as per nursing staff indicating it is a contraindication. Will continue to monitor and told the patient if she starts having acute abdominal pain, nausea, vomiting to let the nurse know and a NG tube would need to be placed and surgery possibly called with another repeat Ab x-ray. She had prior bowel obstruction in the past years ago with NGT placement a/w nausea and vomiting at that time. Prior hip surgery and hysterectomy, differential of LBO vs Pseudoobstruction (Swati syndrome) vs Anal mets mechanical obstruction vs Colon cancer obstruction

## 2017-12-27 NOTE — PROGRESS NOTE ADULT - PROBLEM SELECTOR PLAN 3
- Unclear if this is actually a COPD exacerbation. Patient is not truly hypoxic, and CT Chest does not show acute disease; and improved significantly while on the floor  - Per pulm, contineu hypertonic saline to aid in clearance, chest PT spiriva, symbicort BID, Duonebs, and home dose steroids  - Patient completed a sputum culture with her OP pulmonologist and it was positive for group B strep now requiring Bactrim  to be completed on 12/28  - appreciate pulm recs  - continue incentive spirometry and acapella

## 2017-12-27 NOTE — DIETITIAN INITIAL EVALUATION ADULT. - SIGNS/SYMPTOMS
8.5% wt loss in 10 months recent Hx of cancer; ?in remission, COPD, 8.5% wt loss in 10 months-now stable

## 2017-12-27 NOTE — PROGRESS NOTE ADULT - PROBLEM SELECTOR PLAN 7
protonix 40 q am  reglan stopped due to side effects]  s/p egd/colonoscopy-?need for repeat colonoscopy now or 1 year

## 2017-12-27 NOTE — PROGRESS NOTE ADULT - ASSESSMENT
67 yr F with PMH of COPD/asthma, tracheobronchomalacia s/p tracheo-bronchoplasty in 10/16, Afib on Eliquis , Adrenal Insufficieny on steroids,  DM2, HTN, Squamous cell CA of the anus on chemo/XRT, now admitted with transient episode of shortness of breath that occurred after eating.    1) SOB  - feeling ok today, no further episodes of SOB   - symptoms may have been triggered by ? gastroparesis as her symptoms occurred when she ate- recommend GI eval --had EGD-await path  - CT chest does show some mild tree in bud likely due to mucus plugging and combined with increased cough and change in sputum color it is reasonable to treat for tracheobronchitis - pt is currently on bactrim and should complete a 7 day course--Finished  - add nebulized 3% hypertonic saline (to be given after duonebs) to help with clearance  - Chest PT, incentive spirometry, OOB as tolerated   - keep O2 sats >92%  - check O2 sat on RA with ambulation     2) Asthma  - does not appear to have an asthma exacerbation at this time   -  steroids reduced  to home dose of medrol  - spiriva and symbicort bid

## 2017-12-27 NOTE — DIETITIAN INITIAL EVALUATION ADULT. - PERTINENT LABORATORY DATA
12-27 Na143 mmol/L Glu 224 mg/dL<H> K+ 4.1 mmol/L Cr  0.77 mg/dL BUN 10 mg/dL Phos 3.4 mg/dL, A1c=8.0 (10/31), POCT= (12/25) 194, (12/26) 214, 189, 150 158 H. 12-27 Na143 mmol/L Glu 224 mg/dL<H> K+ 4.1 mmol/L Cr  0.77 mg/dL BUN 10 mg/dL Phos 3.4 mg/dL, 10/31 A1c=8.0 H, fingersticks= 12/27 194, 12/26 150-214

## 2017-12-27 NOTE — DIETITIAN INITIAL EVALUATION ADULT. - PROBLEM SELECTOR PLAN 2
- given patient's CT findings of a pancreatic lesion, may need to order MRI for further characterization. Will order CA 19-9 and CEA, amylase, lipase.  - H/h stable, and takes pantoprazole at home. Will send H. Pylori studies and start maalox prn dyspepsia. May require GI consultation if unable to find etiology, as patient is chronically on steroids and could have an element of gastritis, and in the setting of darker stool could be indicative of bleed.  - Diabetic gastroparesis also a possibility.  - Also possible angiinal equivalent. C/s cards in  AM -> may need stress test

## 2017-12-27 NOTE — DIETITIAN INITIAL EVALUATION ADULT. - ADHERENCE
Pt reports following a consistent carbohydrate and low sodium diet PTA. Pt adheres to carb counting and pairs proteins with carbohydrate; takes fingersticks at home ~6x/day with every meal/snack; states BG levels are ~99 in the mornings and are steady throughout day; takes Novolog, Lantus and Victroza PTA; as per previous RD note, A1c=8.5 in (10/2016) and is currently 8.0 (10/31/17). Takes vitamin D and Ensure occasionally as a meal replacement with an apple. Information requested on low carb, high protein, high calorie snacks./good good/Pt reports following a consistent carbohydrate and low sodium diet PTA. Pt likes to carb count, avoids high starch foods, and pairs proteins with carbohydrates; takes fingersticks at home ~6x/day with every meal/snack; states BG levels are ~99 in the mornings and are steady throughout day; takes Novolog, Lantus and Victroza PTA; as per previous RD note, A1c=8.5 in (10/2016) and is currently 8.0 (10/31/17). Takes vitamin D and Ensure occasionally as a meal replacement with an apple. Information requested on low carb, high protein, high calorie snacks.

## 2017-12-27 NOTE — PROGRESS NOTE ADULT - SUBJECTIVE AND OBJECTIVE BOX
CHIEF COMPLAINT: boated less so, no change in cough--sputum production    Interval Events: s/p egd/colonoscopy    REVIEW OF SYSTEMS:  Constitutional: No fevers or chills. No weight loss. No fatigue or generalized malaise.  Eyes: No itching or discharge from the eyes  ENT: No ear pain. No ear discharge. No nasal congestion. No post nasal drip. No epistaxis. No throat pain. No sore throat. No difficulty swallowing.   CV: No chest pain. No palpitations. No lightheadedness or dizziness.   Resp: No dyspnea at rest. + dyspnea on exertion. No orthopnea. No wheezing. + cough. No stridor. + sputum production. No chest pain with respiration.  GI: No nausea. No vomiting. No diarrhea.  MSK: No joint pain or pain in any extremities  Integumentary: No skin lesions. No pedal edema.  Neurological: No gross motor weakness. No sensory changes.  [ +] All other systems negative  [ ] Unable to assess ROS because ________    OBJECTIVE:  ICU Vital Signs Last 24 Hrs  T(C): 36.6 (27 Dec 2017 04:42), Max: 36.6 (26 Dec 2017 21:27)  T(F): 97.9 (27 Dec 2017 04:42), Max: 97.9 (26 Dec 2017 21:27)  HR: 67 (27 Dec 2017 04:42) (63 - 88)  BP: 118/69 (27 Dec 2017 04:42) (112/68 - 123/73)  BP(mean): --  ABP: --  ABP(mean): --  RR: 18 (27 Dec 2017 04:42) (18 - 18)  SpO2: 98% (27 Dec 2017 04:42) (98% - 99%)        12-26 @ 07:01  -  12-27 @ 05:18  --------------------------------------------------------  IN: 0 mL / OUT: 300 mL / NET: -300 mL      CAPILLARY BLOOD GLUCOSE      POCT Blood Glucose.: 214 mg/dL (26 Dec 2017 21:43)      PHYSICAL EXAM: NAD in bed  General: Awake, alert, oriented X 3.   HEENT: Atraumatic, normocephalic.                 Mallampatti Grade 2                No nasal congestion.                No tonsillar or pharyngeal exudates.  Lymph Nodes: No palpable lymphadenopathy  Neck: No JVD. No carotid bruit.   Respiratory: Normal chest expansion                         Normal percussion                         Normal and equal air entry                         ++exp wheeze and rhonchi bu no rales.  Cardiovascular: S1 S2 normal. No murmurs, rubs or gallops.   Abdomen: Soft, non-tender, non-distended. No organomegaly.  Extremities: Warm to touch. Peripheral pulse palpable. No pedal edema.   Skin: No rashes or skin lesions  Neurological: Motor and sensory examination equal and normal in all four extremities.  Psychiatry: Appropriate mood and affect.    HOSPITAL MEDICATIONS:  MEDICATIONS  (STANDING):  ALBUTerol/ipratropium for Nebulization 3 milliLiter(s) Nebulizer every 6 hours  ascorbic acid 500 milliGRAM(s) Oral daily  buDESOnide 160 MICROgram(s)/formoterol 4.5 MICROgram(s) Inhaler 2 Puff(s) Inhalation two times a day  cholecalciferol 5000 Unit(s) Oral daily  dextrose 5%. 1000 milliLiter(s) (50 mL/Hr) IV Continuous <Continuous>  dextrose 50% Injectable 12.5 Gram(s) IV Push once  dextrose 50% Injectable 25 Gram(s) IV Push once  dextrose 50% Injectable 25 Gram(s) IV Push once  digoxin     Tablet 0.25 milliGRAM(s) Oral daily  docusate sodium 100 milliGRAM(s) Oral daily  famotidine    Tablet 20 milliGRAM(s) Oral daily  hydrocortisone 0.5% Cream 1 Application(s) Topical two times a day  insulin glargine Injectable (LANTUS) 18 Unit(s) SubCutaneous at bedtime  insulin lispro (HumaLOG) corrective regimen sliding scale   SubCutaneous at bedtime  insulin lispro (HumaLOG) corrective regimen sliding scale   SubCutaneous three times a day before meals  insulin lispro Injectable (HumaLOG) 8 Unit(s) SubCutaneous three times a day before meals  methylPREDNISolone 4 milliGRAM(s) Oral daily  montelukast 10 milliGRAM(s) Oral daily  ondansetron   Disintegrating Tablet 4 milliGRAM(s) Oral once  pantoprazole    Tablet 40 milliGRAM(s) Oral before breakfast  polyethylene glycol 3350 17 Gram(s) Oral daily  pregabalin 150 milliGRAM(s) Oral two times a day  senna 2 Tablet(s) Oral at bedtime  sodium chloride 0.9%. 1000 milliLiter(s) (75 mL/Hr) IV Continuous <Continuous>  sodium chloride 3%  Inhalation 4 milliLiter(s) Inhalation daily  tiotropium 18 MICROgram(s) Capsule 1 Capsule(s) Inhalation daily  trimethoprim  160 mG/sulfamethoxazole 800 mG 1 Tablet(s) Oral two times a day  valsartan 40 milliGRAM(s) Oral daily    MEDICATIONS  (PRN):  aluminum hydroxide/magnesium hydroxide/simethicone Suspension 30 milliLiter(s) Oral every 4 hours PRN Dyspepsia  dextrose Gel 1 Dose(s) Oral once PRN Blood Glucose LESS THAN 70 milliGRAM(s)/deciliter  glucagon  Injectable 1 milliGRAM(s) IntraMuscular once PRN Glucose LESS THAN 70 milligrams/deciliter      LABS:                        12.6   6.1   )-----------( 259      ( 26 Dec 2017 07:12 )             39.8     12-26    138  |  97  |  11  ----------------------------<  91  3.7   |  31  |  0.82    Ca    8.6      26 Dec 2017 07:12  Phos  4.4     12-26  Mg     2.0     12-26    TPro  6.0  /  Alb  3.2<L>  /  TBili  0.1<L>  /  DBili  x   /  AST  10  /  ALT  10  /  AlkPhos  91  12-26    PT/INR - ( 26 Dec 2017 07:13 )   PT: 11.7 sec;   INR: 1.08 ratio         PTT - ( 26 Dec 2017 07:13 )  PTT:30.0 sec          MICROBIOLOGY:     RADIOLOGY:  [ ] Reviewed and interpreted by me    Point of Care Ultrasound Findings:    PFT:    EKG:

## 2017-12-27 NOTE — PROGRESS NOTE ADULT - ASSESSMENT
68 YO F pmh of COPD, asthma, tracheobronchomalacia s/p tracheo-bronchoplasty (October 2016), Afib (on Eliquis), HTN, Adrenal Insufficiency (on chronic medrol), DM2, HTN, remote hx of DVT and squamous cell CA of the anus s/p chemo and RT who presents with the complaint of SOB and epigastric discomfort most likely due to COPD/asthma exacerbation vs ulcer 68 YO F pmh of COPD, asthma, tracheobronchomalacia s/p tracheo-bronchoplasty (October 2016), Afib (on Eliquis), HTN, Adrenal Insufficiency (on chronic medrol), DM2, HTN, remote hx of DVT and squamous cell CA of the anus s/p chemo and RT who presents with the complaint of SOB and epigastric discomfort most likely due to COPD/asthma exacerbation vs ulcer.

## 2017-12-28 LAB
GLUCOSE BLDC GLUCOMTR-MCNC: 116 MG/DL — HIGH (ref 70–99)
GLUCOSE BLDC GLUCOMTR-MCNC: 136 MG/DL — HIGH (ref 70–99)
GLUCOSE BLDC GLUCOMTR-MCNC: 140 MG/DL — HIGH (ref 70–99)
GLUCOSE BLDC GLUCOMTR-MCNC: 179 MG/DL — HIGH (ref 70–99)
SURGICAL PATHOLOGY STUDY: SIGNIFICANT CHANGE UP

## 2017-12-28 PROCEDURE — 99233 SBSQ HOSP IP/OBS HIGH 50: CPT | Mod: GC

## 2017-12-28 PROCEDURE — 99233 SBSQ HOSP IP/OBS HIGH 50: CPT

## 2017-12-28 RX ORDER — IPRATROPIUM/ALBUTEROL SULFATE 18-103MCG
3 AEROSOL WITH ADAPTER (GRAM) INHALATION EVERY 4 HOURS
Qty: 0 | Refills: 0 | Status: DISCONTINUED | OUTPATIENT
Start: 2017-12-28 | End: 2017-12-28

## 2017-12-28 RX ORDER — IPRATROPIUM/ALBUTEROL SULFATE 18-103MCG
3 AEROSOL WITH ADAPTER (GRAM) INHALATION
Qty: 0 | Refills: 0 | Status: DISCONTINUED | OUTPATIENT
Start: 2017-12-28 | End: 2017-12-29

## 2017-12-28 RX ORDER — FLUCONAZOLE 150 MG/1
200 TABLET ORAL DAILY
Qty: 0 | Refills: 0 | Status: DISCONTINUED | OUTPATIENT
Start: 2017-12-28 | End: 2017-12-29

## 2017-12-28 RX ADMIN — Medication 8 UNIT(S): at 13:29

## 2017-12-28 RX ADMIN — Medication 1 APPLICATION(S): at 18:15

## 2017-12-28 RX ADMIN — Medication 5000 UNIT(S): at 12:12

## 2017-12-28 RX ADMIN — POLYETHYLENE GLYCOL 3350 17 GRAM(S): 17 POWDER, FOR SOLUTION ORAL at 12:13

## 2017-12-28 RX ADMIN — BUDESONIDE AND FORMOTEROL FUMARATE DIHYDRATE 2 PUFF(S): 160; 4.5 AEROSOL RESPIRATORY (INHALATION) at 17:48

## 2017-12-28 RX ADMIN — Medication 8 UNIT(S): at 09:11

## 2017-12-28 RX ADMIN — Medication 500 MILLIGRAM(S): at 12:11

## 2017-12-28 RX ADMIN — Medication 0.25 MILLIGRAM(S): at 05:36

## 2017-12-28 RX ADMIN — Medication 8 UNIT(S): at 18:13

## 2017-12-28 RX ADMIN — PANTOPRAZOLE SODIUM 40 MILLIGRAM(S): 20 TABLET, DELAYED RELEASE ORAL at 05:37

## 2017-12-28 RX ADMIN — Medication 1 APPLICATION(S): at 05:41

## 2017-12-28 RX ADMIN — Medication 1 TABLET(S): at 05:36

## 2017-12-28 RX ADMIN — SENNA PLUS 2 TABLET(S): 8.6 TABLET ORAL at 22:41

## 2017-12-28 RX ADMIN — Medication 3 MILLILITER(S): at 17:48

## 2017-12-28 RX ADMIN — Medication 1 TABLET(S): at 18:14

## 2017-12-28 RX ADMIN — SODIUM CHLORIDE 4 MILLILITER(S): 9 INJECTION INTRAMUSCULAR; INTRAVENOUS; SUBCUTANEOUS at 11:02

## 2017-12-28 RX ADMIN — Medication 2: at 18:13

## 2017-12-28 RX ADMIN — FLUCONAZOLE 200 MILLIGRAM(S): 150 TABLET ORAL at 12:11

## 2017-12-28 RX ADMIN — Medication 150 MILLIGRAM(S): at 05:37

## 2017-12-28 RX ADMIN — VALSARTAN 40 MILLIGRAM(S): 80 TABLET ORAL at 05:36

## 2017-12-28 RX ADMIN — MONTELUKAST 10 MILLIGRAM(S): 4 TABLET, CHEWABLE ORAL at 12:12

## 2017-12-28 RX ADMIN — Medication 100 MILLIGRAM(S): at 12:12

## 2017-12-28 RX ADMIN — INSULIN GLARGINE 18 UNIT(S): 100 INJECTION, SOLUTION SUBCUTANEOUS at 22:40

## 2017-12-28 RX ADMIN — Medication 30 MILLILITER(S): at 10:49

## 2017-12-28 RX ADMIN — TIOTROPIUM BROMIDE 1 CAPSULE(S): 18 CAPSULE ORAL; RESPIRATORY (INHALATION) at 11:02

## 2017-12-28 RX ADMIN — FAMOTIDINE 20 MILLIGRAM(S): 10 INJECTION INTRAVENOUS at 12:12

## 2017-12-28 RX ADMIN — BUDESONIDE AND FORMOTEROL FUMARATE DIHYDRATE 2 PUFF(S): 160; 4.5 AEROSOL RESPIRATORY (INHALATION) at 06:29

## 2017-12-28 RX ADMIN — Medication 150 MILLIGRAM(S): at 18:18

## 2017-12-28 RX ADMIN — Medication 3 MILLILITER(S): at 11:02

## 2017-12-28 RX ADMIN — Medication 3 MILLILITER(S): at 06:29

## 2017-12-28 RX ADMIN — Medication 4 MILLIGRAM(S): at 05:35

## 2017-12-28 NOTE — PROGRESS NOTE ADULT - PROBLEM SELECTOR PLAN 3
- Unclear if this is actually a COPD exacerbation. Patient is not truly hypoxic, and CT Chest does not show acute disease; and improved significantly while on the floor  - Per pulm, continue hypertonic saline to aid in clearance, chest PT spiriva, symbicort BID, and home dose steroids  - increased duonebs to q4  - Patient completed a sputum culture with her OP pulmonologist and it was positive for group B strep now requiring Bactrim  to be completed on 12/28  - appreciate pulm recs  - continue incentive spirometry and acapella

## 2017-12-28 NOTE — PROGRESS NOTE ADULT - SUBJECTIVE AND OBJECTIVE BOX
Patient is a 67y old  Female who presents with a chief complaint of SOB and epigastric discomfort (23 Dec 2017 16:31)      SUBJECTIVE / OVERNIGHT EVENTS:  Pt with ERIKA overnight. Pt has no complaints this AM, but continues to endorse productive cough. Pt denies any fevers/chills, CP, palpitations, SOB, N/V/D/C, melena, BRBPR, dysuria, hematuria.    MEDICATIONS  (STANDING):  ALBUTerol/ipratropium for Nebulization 3 milliLiter(s) Nebulizer every 6 hours  ascorbic acid 500 milliGRAM(s) Oral daily  buDESOnide 160 MICROgram(s)/formoterol 4.5 MICROgram(s) Inhaler 2 Puff(s) Inhalation two times a day  cholecalciferol 5000 Unit(s) Oral daily  dextrose 5%. 1000 milliLiter(s) (50 mL/Hr) IV Continuous <Continuous>  dextrose 50% Injectable 12.5 Gram(s) IV Push once  dextrose 50% Injectable 25 Gram(s) IV Push once  dextrose 50% Injectable 25 Gram(s) IV Push once  digoxin     Tablet 0.25 milliGRAM(s) Oral daily  docusate sodium 100 milliGRAM(s) Oral daily  famotidine    Tablet 20 milliGRAM(s) Oral daily  hydrocortisone 0.5% Cream 1 Application(s) Topical two times a day  insulin glargine Injectable (LANTUS) 18 Unit(s) SubCutaneous at bedtime  insulin lispro (HumaLOG) corrective regimen sliding scale   SubCutaneous at bedtime  insulin lispro (HumaLOG) corrective regimen sliding scale   SubCutaneous three times a day before meals  insulin lispro Injectable (HumaLOG) 8 Unit(s) SubCutaneous three times a day before meals  methylPREDNISolone 4 milliGRAM(s) Oral daily  montelukast 10 milliGRAM(s) Oral daily  ondansetron   Disintegrating Tablet 4 milliGRAM(s) Oral once  pantoprazole    Tablet 40 milliGRAM(s) Oral before breakfast  polyethylene glycol 3350 17 Gram(s) Oral daily  pregabalin 150 milliGRAM(s) Oral two times a day  senna 2 Tablet(s) Oral at bedtime  sodium chloride 0.9%. 1000 milliLiter(s) (75 mL/Hr) IV Continuous <Continuous>  sodium chloride 3%  Inhalation 4 milliLiter(s) Inhalation daily  tiotropium 18 MICROgram(s) Capsule 1 Capsule(s) Inhalation daily  trimethoprim  160 mG/sulfamethoxazole 800 mG 1 Tablet(s) Oral two times a day  valsartan 40 milliGRAM(s) Oral daily    MEDICATIONS  (PRN):  aluminum hydroxide/magnesium hydroxide/simethicone Suspension 30 milliLiter(s) Oral every 4 hours PRN Dyspepsia  dextrose Gel 1 Dose(s) Oral once PRN Blood Glucose LESS THAN 70 milliGRAM(s)/deciliter  glucagon  Injectable 1 milliGRAM(s) IntraMuscular once PRN Glucose LESS THAN 70 milligrams/deciliter      OBJECTIVE:    Vital Signs Last 24 Hrs  T(C): 36.5 (28 Dec 2017 05:07), Max: 36.7 (27 Dec 2017 20:18)  T(F): 97.7 (28 Dec 2017 05:07), Max: 98.1 (27 Dec 2017 20:18)  HR: 61 (28 Dec 2017 05:07) (61 - 80)  BP: 125/69 (28 Dec 2017 05:07) (103/58 - 125/69)  BP(mean): --  RR: 18 (28 Dec 2017 05:07) (18 - 18)  SpO2: 97% (28 Dec 2017 05:07) (96% - 100%)    CAPILLARY BLOOD GLUCOSE      POCT Blood Glucose.: 176 mg/dL (27 Dec 2017 21:42)  POCT Blood Glucose.: 275 mg/dL (27 Dec 2017 17:55)  POCT Blood Glucose.: 113 mg/dL (27 Dec 2017 12:36)  POCT Blood Glucose.: 194 mg/dL (27 Dec 2017 08:53)    I&O's Summary    27 Dec 2017 07:01  -  28 Dec 2017 07:00  --------------------------------------------------------  IN: 360 mL / OUT: 500 mL / NET: -140 mL        PHYSICAL EXAM:  GENERAL: NAD, well-developed  HEAD:  Atraumatic, Normocephalic  EYES: EOMI, PERRLA, conjunctiva and sclera clear  NECK: Supple, No JVD  CHEST/LUNG: rales in the b/l lung bases with prominent wheezes in the left lung field  HEART: Regular rate and rhythm; No murmurs, rubs, or gallops  ABDOMEN: Soft, Nontender, Nondistended; Bowel sounds present  EXTREMITIES:  2+ Peripheral Pulses, No clubbing, cyanosis, or edema  PSYCH: AAOx3  NEUROLOGY: non-focal  SKIN: No rashes or lesions    LABS:                        12.5   6.1   )-----------( 245      ( 27 Dec 2017 16:47 )             39.7     Auto Eosinophil # x     / Auto Eosinophil % x     / Auto Neutrophil # x     / Auto Neutrophil % x     / BANDS % x                            11.4   5.1   )-----------( 237      ( 27 Dec 2017 06:45 )             35.8     Auto Eosinophil # x     / Auto Eosinophil % x     / Auto Neutrophil # x     / Auto Neutrophil % x     / BANDS % x        12-27    143  |  103  |  10  ----------------------------<  224<H>  4.1   |  30  |  0.77    Ca    8.6      27 Dec 2017 06:45  Mg     2.2     12-27  Phos  3.4     12-27                RESPIRATORY  VENT:    ABG:     VBG:     RADIOLOGY & ADDITIONAL TESTS:  (Imaging Personally Reviewed)    Consultant(s) Notes Reviewed:      Care Discussed with Consultants/Other Providers: Patient is a 67y old  Female who presents with a chief complaint of SOB and epigastric discomfort (23 Dec 2017 16:31)      SUBJECTIVE / OVERNIGHT EVENTS:  Pt with ERIKA overnight. Pt has no complaints this AM, but continues to endorse productive cough. Pt denies any fevers/chills, CP, palpitations, SOB, N/V/D/C, melena, BRBPR, dysuria, hematuria.    MEDICATIONS  (STANDING):  ALBUTerol/ipratropium for Nebulization 3 milliLiter(s) Nebulizer every 6 hours  ascorbic acid 500 milliGRAM(s) Oral daily  buDESOnide 160 MICROgram(s)/formoterol 4.5 MICROgram(s) Inhaler 2 Puff(s) Inhalation two times a day  cholecalciferol 5000 Unit(s) Oral daily  dextrose 5%. 1000 milliLiter(s) (50 mL/Hr) IV Continuous <Continuous>  dextrose 50% Injectable 12.5 Gram(s) IV Push once  dextrose 50% Injectable 25 Gram(s) IV Push once  dextrose 50% Injectable 25 Gram(s) IV Push once  digoxin     Tablet 0.25 milliGRAM(s) Oral daily  docusate sodium 100 milliGRAM(s) Oral daily  famotidine    Tablet 20 milliGRAM(s) Oral daily  hydrocortisone 0.5% Cream 1 Application(s) Topical two times a day  insulin glargine Injectable (LANTUS) 18 Unit(s) SubCutaneous at bedtime  insulin lispro (HumaLOG) corrective regimen sliding scale   SubCutaneous at bedtime  insulin lispro (HumaLOG) corrective regimen sliding scale   SubCutaneous three times a day before meals  insulin lispro Injectable (HumaLOG) 8 Unit(s) SubCutaneous three times a day before meals  methylPREDNISolone 4 milliGRAM(s) Oral daily  montelukast 10 milliGRAM(s) Oral daily  ondansetron   Disintegrating Tablet 4 milliGRAM(s) Oral once  pantoprazole    Tablet 40 milliGRAM(s) Oral before breakfast  polyethylene glycol 3350 17 Gram(s) Oral daily  pregabalin 150 milliGRAM(s) Oral two times a day  senna 2 Tablet(s) Oral at bedtime  sodium chloride 0.9%. 1000 milliLiter(s) (75 mL/Hr) IV Continuous <Continuous>  sodium chloride 3%  Inhalation 4 milliLiter(s) Inhalation daily  tiotropium 18 MICROgram(s) Capsule 1 Capsule(s) Inhalation daily  trimethoprim  160 mG/sulfamethoxazole 800 mG 1 Tablet(s) Oral two times a day  valsartan 40 milliGRAM(s) Oral daily    MEDICATIONS  (PRN):  aluminum hydroxide/magnesium hydroxide/simethicone Suspension 30 milliLiter(s) Oral every 4 hours PRN Dyspepsia  dextrose Gel 1 Dose(s) Oral once PRN Blood Glucose LESS THAN 70 milliGRAM(s)/deciliter  glucagon  Injectable 1 milliGRAM(s) IntraMuscular once PRN Glucose LESS THAN 70 milligrams/deciliter      OBJECTIVE:    Vital Signs Last 24 Hrs  T(C): 36.5 (28 Dec 2017 05:07), Max: 36.7 (27 Dec 2017 20:18)  T(F): 97.7 (28 Dec 2017 05:07), Max: 98.1 (27 Dec 2017 20:18)  HR: 61 (28 Dec 2017 05:07) (61 - 80)  BP: 125/69 (28 Dec 2017 05:07) (103/58 - 125/69)  BP(mean): --  RR: 18 (28 Dec 2017 05:07) (18 - 18)  SpO2: 97% (28 Dec 2017 05:07) (96% - 100%)    CAPILLARY BLOOD GLUCOSE      POCT Blood Glucose.: 176 mg/dL (27 Dec 2017 21:42)  POCT Blood Glucose.: 275 mg/dL (27 Dec 2017 17:55)  POCT Blood Glucose.: 113 mg/dL (27 Dec 2017 12:36)  POCT Blood Glucose.: 194 mg/dL (27 Dec 2017 08:53)    I&O's Summary    27 Dec 2017 07:01  -  28 Dec 2017 07:00  --------------------------------------------------------  IN: 360 mL / OUT: 500 mL / NET: -140 mL        PHYSICAL EXAM:  GENERAL: NAD, well-developed  HEAD:  Atraumatic, Normocephalic  EYES: EOMI, PERRLA, conjunctiva and sclera clear  Mouth: multiple white plaques on tongue and buccal mucosa.   NECK: Supple, No JVD  CHEST/LUNG: rales in the b/l lung bases with prominent wheezes in the left lung field  HEART: Regular rate and rhythm; No murmurs, rubs, or gallops  ABDOMEN: Soft, Nontender, Nondistended; Bowel sounds present  EXTREMITIES:  2+ Peripheral Pulses, No clubbing, cyanosis, or edema  PSYCH: AAOx3  NEUROLOGY: non-focal  SKIN: No rashes or lesions    LABS:                        12.5   6.1   )-----------( 245      ( 27 Dec 2017 16:47 )             39.7     Auto Eosinophil # x     / Auto Eosinophil % x     / Auto Neutrophil # x     / Auto Neutrophil % x     / BANDS % x                            11.4   5.1   )-----------( 237      ( 27 Dec 2017 06:45 )             35.8     Auto Eosinophil # x     / Auto Eosinophil % x     / Auto Neutrophil # x     / Auto Neutrophil % x     / BANDS % x        12-27    143  |  103  |  10  ----------------------------<  224<H>  4.1   |  30  |  0.77    Ca    8.6      27 Dec 2017 06:45  Mg     2.2     12-27  Phos  3.4     12-27                RESPIRATORY  VENT:    ABG:     VBG:     RADIOLOGY & ADDITIONAL TESTS:  (Imaging Personally Reviewed)    Consultant(s) Notes Reviewed:      Care Discussed with Consultants/Other Providers:

## 2017-12-28 NOTE — PROGRESS NOTE ADULT - PROBLEM SELECTOR PLAN 10
- DVT ppx: Eliquis  - DISPO: PT  - Diet: will advance to CC DASH diet    Mark Kaur, PGY-1  Care Model B  Pager [phone]
- DVT ppx: Eliquis  - DISPO: PT  - Diet: NPO    Mark Kaur, PGY-1  Care Model B  Pager [phone]

## 2017-12-28 NOTE — PROGRESS NOTE ADULT - PROBLEM SELECTOR PLAN 1
- pt endorsing flatus and reports a BM yesterday  - final abd xray read shows non-obstructive gas pattern large bowel obstruction has been ruled out.  - pt endorsing flatus and reports a BM yesterday  - final abd xray read shows non-obstructive gas pattern

## 2017-12-28 NOTE — PROGRESS NOTE ADULT - ASSESSMENT
66 YO F pmh of COPD, asthma, tracheobronchomalacia s/p tracheo-bronchoplasty (October 2016), Afib (on Eliquis), HTN, Adrenal Insufficiency (on chronic medrol), DM2, HTN, remote hx of DVT and squamous cell CA of the anus s/p chemo and RT who presents with the complaint of SOB and epigastric discomfort most likely due to COPD/asthma exacerbation vs ulcer.

## 2017-12-28 NOTE — CHART NOTE - NSCHARTNOTEFT_GEN_A_CORE
Nutrition Education: Provided pt with individualized list of low carbohydrate snack options as requested. Reinforced consistent carbohydrate education.      RD to remain available for further education upon request.    Syed Ascencio, Dietetic Intern

## 2017-12-28 NOTE — PROGRESS NOTE ADULT - PROBLEM SELECTOR PLAN 4
- pt with hx of rectal CA s/p chemoRx and RT, in remission as per pt. Follows Dr. Maxwell ([facility])   - planned for PET as outpatient  - pt requesting private GI. consult pending

## 2017-12-28 NOTE — PROGRESS NOTE ADULT - SUBJECTIVE AND OBJECTIVE BOX
CHIEF COMPLAINT: better over all    Interval Events: no BM; wants private GI doc    REVIEW OF SYSTEMS:  Constitutional: No fevers or chills. No weight loss. No fatigue or generalized malaise.  Eyes: No itching or discharge from the eyes  ENT: No ear pain. No ear discharge. No nasal congestion. No post nasal drip. No epistaxis. No throat pain. No sore throat. No difficulty swallowing.   CV: No chest pain. No palpitations. No lightheadedness or dizziness.   Resp: No dyspnea at rest. + dyspnea on exertion. No orthopnea. + wheezing. + cough. No stridor. + sputum production. No chest pain with respiration.  GI: No nausea. No vomiting. No diarrhea.  MSK: No joint pain or pain in any extremities  Integumentary: No skin lesions. No pedal edema.  Neurological: No gross motor weakness. No sensory changes.  [+ ] All other systems negative  [ ] Unable to assess ROS because ________    OBJECTIVE:  ICU Vital Signs Last 24 Hrs  T(C): 36.5 (28 Dec 2017 05:07), Max: 36.7 (27 Dec 2017 20:18)  T(F): 97.7 (28 Dec 2017 05:07), Max: 98.1 (27 Dec 2017 20:18)  HR: 61 (28 Dec 2017 05:07) (61 - 80)  BP: 125/69 (28 Dec 2017 05:07) (103/58 - 125/69)  BP(mean): --  ABP: --  ABP(mean): --  RR: 18 (28 Dec 2017 05:07) (18 - 18)  SpO2: 97% (28 Dec 2017 05:07) (96% - 100%)        12-26 @ 07:01  -  12-27 @ 07:00  --------------------------------------------------------  IN: 0 mL / OUT: 300 mL / NET: -300 mL    12-27 @ 07:01  -  12-28 @ 05:17  --------------------------------------------------------  IN: 360 mL / OUT: 500 mL / NET: -140 mL      CAPILLARY BLOOD GLUCOSE      POCT Blood Glucose.: 176 mg/dL (27 Dec 2017 21:42)      PHYSICAL EXAM: NAD  General: Awake, alert, oriented X 3.   HEENT: Atraumatic, normocephalic.                 Mallampatti Grade 2                No nasal congestion.                No tonsillar or pharyngeal exudates.  Lymph Nodes: No palpable lymphadenopathy  Neck: No JVD. No carotid bruit.   Respiratory: Normal chest expansion                         Normal percussion                         Normal and equal air entry                         ++ wheeze,rare rhonchi but no rales.  Cardiovascular: S1 S2 normal. + murmurs,no rubs or gallops.   Abdomen: Soft, non-tender, non-distended. No organomegaly.  Extremities: Warm to touch. Peripheral pulse palpable. No pedal edema.   Skin: No rashes or skin lesions  Neurological: Motor and sensory examination equal and normal in all four extremities.  Psychiatry: Appropriate mood and affect.    HOSPITAL MEDICATIONS:  MEDICATIONS  (STANDING):  ALBUTerol/ipratropium for Nebulization 3 milliLiter(s) Nebulizer every 6 hours  ascorbic acid 500 milliGRAM(s) Oral daily  buDESOnide 160 MICROgram(s)/formoterol 4.5 MICROgram(s) Inhaler 2 Puff(s) Inhalation two times a day  cholecalciferol 5000 Unit(s) Oral daily  dextrose 5%. 1000 milliLiter(s) (50 mL/Hr) IV Continuous <Continuous>  dextrose 50% Injectable 12.5 Gram(s) IV Push once  dextrose 50% Injectable 25 Gram(s) IV Push once  dextrose 50% Injectable 25 Gram(s) IV Push once  digoxin     Tablet 0.25 milliGRAM(s) Oral daily  docusate sodium 100 milliGRAM(s) Oral daily  famotidine    Tablet 20 milliGRAM(s) Oral daily  hydrocortisone 0.5% Cream 1 Application(s) Topical two times a day  insulin glargine Injectable (LANTUS) 18 Unit(s) SubCutaneous at bedtime  insulin lispro (HumaLOG) corrective regimen sliding scale   SubCutaneous at bedtime  insulin lispro (HumaLOG) corrective regimen sliding scale   SubCutaneous three times a day before meals  insulin lispro Injectable (HumaLOG) 8 Unit(s) SubCutaneous three times a day before meals  methylPREDNISolone 4 milliGRAM(s) Oral daily  montelukast 10 milliGRAM(s) Oral daily  ondansetron   Disintegrating Tablet 4 milliGRAM(s) Oral once  pantoprazole    Tablet 40 milliGRAM(s) Oral before breakfast  polyethylene glycol 3350 17 Gram(s) Oral daily  pregabalin 150 milliGRAM(s) Oral two times a day  senna 2 Tablet(s) Oral at bedtime  sodium chloride 0.9%. 1000 milliLiter(s) (75 mL/Hr) IV Continuous <Continuous>  sodium chloride 3%  Inhalation 4 milliLiter(s) Inhalation daily  tiotropium 18 MICROgram(s) Capsule 1 Capsule(s) Inhalation daily  trimethoprim  160 mG/sulfamethoxazole 800 mG 1 Tablet(s) Oral two times a day  valsartan 40 milliGRAM(s) Oral daily    MEDICATIONS  (PRN):  aluminum hydroxide/magnesium hydroxide/simethicone Suspension 30 milliLiter(s) Oral every 4 hours PRN Dyspepsia  dextrose Gel 1 Dose(s) Oral once PRN Blood Glucose LESS THAN 70 milliGRAM(s)/deciliter  glucagon  Injectable 1 milliGRAM(s) IntraMuscular once PRN Glucose LESS THAN 70 milligrams/deciliter      LABS:                        12.5   6.1   )-----------( 245      ( 27 Dec 2017 16:47 )             39.7     12-27    143  |  103  |  10  ----------------------------<  224<H>  4.1   |  30  |  0.77    Ca    8.6      27 Dec 2017 06:45  Phos  3.4     12-27  Mg     2.2     12-27    TPro  6.0  /  Alb  3.2<L>  /  TBili  0.1<L>  /  DBili  x   /  AST  10  /  ALT  10  /  AlkPhos  91  12-26    PT/INR - ( 26 Dec 2017 07:13 )   PT: 11.7 sec;   INR: 1.08 ratio         PTT - ( 26 Dec 2017 07:13 )  PTT:30.0 sec          MICROBIOLOGY:     RADIOLOGY:  [ ] Reviewed and interpreted by me    Point of Care Ultrasound Findings:    PFT:    EKG:

## 2017-12-28 NOTE — PROGRESS NOTE ADULT - ATTENDING COMMENTS
as above--will add in acapella/chest pt by vest as well--no absolute pulm. contras to GI procedures  GI workup is in progress--await path of egd, formal GI evaluation wanted by pt.  Complete abx today--resend sputum    Eulalio Allison MD-Pulmonary   [phone]

## 2017-12-28 NOTE — PROGRESS NOTE ADULT - PROBLEM SELECTOR PLAN 2
- given patient's CT findings of a pancreatic cyst that was seen in a prior CT in 2016 which are unchanged, she will need MRI outpatient for monitoring.   - Diabetic gastroparesis also a possibility; trial of Reglan started however patient did not like medication and is declining medication at this time  - c/w Pepcid and maalox  - path report of biopsied surgical plaques pending

## 2017-12-29 ENCOUNTER — OUTPATIENT (OUTPATIENT)
Dept: OUTPATIENT SERVICES | Facility: HOSPITAL | Age: 67
LOS: 1 days | Discharge: HOME | End: 2017-12-29

## 2017-12-29 VITALS — OXYGEN SATURATION: 97 %

## 2017-12-29 DIAGNOSIS — Z98.89 OTHER SPECIFIED POSTPROCEDURAL STATES: Chronic | ICD-10-CM

## 2017-12-29 DIAGNOSIS — H05.352 EXOSTOSIS OF LEFT ORBIT: Chronic | ICD-10-CM

## 2017-12-29 DIAGNOSIS — Z96.653 PRESENCE OF ARTIFICIAL KNEE JOINT, BILATERAL: Chronic | ICD-10-CM

## 2017-12-29 DIAGNOSIS — C18.9 MALIGNANT NEOPLASM OF COLON, UNSPECIFIED: ICD-10-CM

## 2017-12-29 LAB
GLUCOSE BLDC GLUCOMTR-MCNC: 164 MG/DL — HIGH (ref 70–99)
GLUCOSE BLDC GLUCOMTR-MCNC: 220 MG/DL — HIGH (ref 70–99)
GRAM STN FLD: SIGNIFICANT CHANGE UP
SPECIMEN SOURCE: SIGNIFICANT CHANGE UP

## 2017-12-29 PROCEDURE — 93971 EXTREMITY STUDY: CPT

## 2017-12-29 PROCEDURE — 84100 ASSAY OF PHOSPHORUS: CPT

## 2017-12-29 PROCEDURE — 94640 AIRWAY INHALATION TREATMENT: CPT

## 2017-12-29 PROCEDURE — 88305 TISSUE EXAM BY PATHOLOGIST: CPT

## 2017-12-29 PROCEDURE — 93005 ELECTROCARDIOGRAM TRACING: CPT

## 2017-12-29 PROCEDURE — 85027 COMPLETE CBC AUTOMATED: CPT

## 2017-12-29 PROCEDURE — 85379 FIBRIN DEGRADATION QUANT: CPT

## 2017-12-29 PROCEDURE — 82553 CREATINE MB FRACTION: CPT

## 2017-12-29 PROCEDURE — 83880 ASSAY OF NATRIURETIC PEPTIDE: CPT

## 2017-12-29 PROCEDURE — 96374 THER/PROPH/DIAG INJ IV PUSH: CPT

## 2017-12-29 PROCEDURE — 87070 CULTURE OTHR SPECIMN AEROBIC: CPT

## 2017-12-29 PROCEDURE — 86301 IMMUNOASSAY TUMOR CA 19-9: CPT

## 2017-12-29 PROCEDURE — 83690 ASSAY OF LIPASE: CPT

## 2017-12-29 PROCEDURE — 82962 GLUCOSE BLOOD TEST: CPT

## 2017-12-29 PROCEDURE — 84484 ASSAY OF TROPONIN QUANT: CPT

## 2017-12-29 PROCEDURE — 82274 ASSAY TEST FOR BLOOD FECAL: CPT

## 2017-12-29 PROCEDURE — 87581 M.PNEUMON DNA AMP PROBE: CPT

## 2017-12-29 PROCEDURE — 74018 RADEX ABDOMEN 1 VIEW: CPT

## 2017-12-29 PROCEDURE — 87486 CHLMYD PNEUM DNA AMP PROBE: CPT

## 2017-12-29 PROCEDURE — 81003 URINALYSIS AUTO W/O SCOPE: CPT

## 2017-12-29 PROCEDURE — 80048 BASIC METABOLIC PNL TOTAL CA: CPT

## 2017-12-29 PROCEDURE — 87633 RESP VIRUS 12-25 TARGETS: CPT

## 2017-12-29 PROCEDURE — 76604 US EXAM CHEST: CPT

## 2017-12-29 PROCEDURE — 82378 CARCINOEMBRYONIC ANTIGEN: CPT

## 2017-12-29 PROCEDURE — 85610 PROTHROMBIN TIME: CPT

## 2017-12-29 PROCEDURE — 82550 ASSAY OF CK (CPK): CPT

## 2017-12-29 PROCEDURE — 99233 SBSQ HOSP IP/OBS HIGH 50: CPT

## 2017-12-29 PROCEDURE — 86677 HELICOBACTER PYLORI ANTIBODY: CPT

## 2017-12-29 PROCEDURE — 80053 COMPREHEN METABOLIC PANEL: CPT

## 2017-12-29 PROCEDURE — 94664 DEMO&/EVAL PT USE INHALER: CPT

## 2017-12-29 PROCEDURE — 71046 X-RAY EXAM CHEST 2 VIEWS: CPT

## 2017-12-29 PROCEDURE — 99285 EMERGENCY DEPT VISIT HI MDM: CPT | Mod: 25

## 2017-12-29 PROCEDURE — 82150 ASSAY OF AMYLASE: CPT

## 2017-12-29 PROCEDURE — 88312 SPECIAL STAINS GROUP 1: CPT

## 2017-12-29 PROCEDURE — 87186 SC STD MICRODIL/AGAR DIL: CPT

## 2017-12-29 PROCEDURE — 87338 HPYLORI STOOL AG IA: CPT

## 2017-12-29 PROCEDURE — 93970 EXTREMITY STUDY: CPT

## 2017-12-29 PROCEDURE — 85730 THROMBOPLASTIN TIME PARTIAL: CPT

## 2017-12-29 PROCEDURE — 87798 DETECT AGENT NOS DNA AMP: CPT

## 2017-12-29 PROCEDURE — 99239 HOSP IP/OBS DSCHRG MGMT >30: CPT

## 2017-12-29 PROCEDURE — 83735 ASSAY OF MAGNESIUM: CPT

## 2017-12-29 PROCEDURE — 97161 PT EVAL LOW COMPLEX 20 MIN: CPT

## 2017-12-29 PROCEDURE — 71250 CT THORAX DX C-: CPT

## 2017-12-29 RX ORDER — LANOLIN ALCOHOL/MO/W.PET/CERES
5 CREAM (GRAM) TOPICAL AT BEDTIME
Qty: 0 | Refills: 0 | Status: DISCONTINUED | OUTPATIENT
Start: 2017-12-29 | End: 2017-12-29

## 2017-12-29 RX ORDER — AZTREONAM 2 G
1 VIAL (EA) INJECTION
Qty: 0 | Refills: 0 | COMMUNITY

## 2017-12-29 RX ORDER — FLUCONAZOLE 150 MG/1
1 TABLET ORAL
Qty: 12 | Refills: 0 | OUTPATIENT
Start: 2017-12-29 | End: 2018-01-09

## 2017-12-29 RX ADMIN — Medication 1 TABLET(S): at 06:14

## 2017-12-29 RX ADMIN — Medication 3 MILLILITER(S): at 00:03

## 2017-12-29 RX ADMIN — SODIUM CHLORIDE 4 MILLILITER(S): 9 INJECTION INTRAMUSCULAR; INTRAVENOUS; SUBCUTANEOUS at 11:07

## 2017-12-29 RX ADMIN — FLUCONAZOLE 200 MILLIGRAM(S): 150 TABLET ORAL at 11:55

## 2017-12-29 RX ADMIN — Medication 500 MILLIGRAM(S): at 11:55

## 2017-12-29 RX ADMIN — Medication 8 UNIT(S): at 09:38

## 2017-12-29 RX ADMIN — FAMOTIDINE 20 MILLIGRAM(S): 10 INJECTION INTRAVENOUS at 11:55

## 2017-12-29 RX ADMIN — BUDESONIDE AND FORMOTEROL FUMARATE DIHYDRATE 2 PUFF(S): 160; 4.5 AEROSOL RESPIRATORY (INHALATION) at 06:13

## 2017-12-29 RX ADMIN — Medication 5000 UNIT(S): at 11:55

## 2017-12-29 RX ADMIN — Medication 150 MILLIGRAM(S): at 06:14

## 2017-12-29 RX ADMIN — Medication 3 MILLILITER(S): at 11:07

## 2017-12-29 RX ADMIN — MONTELUKAST 10 MILLIGRAM(S): 4 TABLET, CHEWABLE ORAL at 11:55

## 2017-12-29 RX ADMIN — Medication 3 MILLILITER(S): at 06:13

## 2017-12-29 RX ADMIN — Medication 100 MILLIGRAM(S): at 11:55

## 2017-12-29 RX ADMIN — TIOTROPIUM BROMIDE 1 CAPSULE(S): 18 CAPSULE ORAL; RESPIRATORY (INHALATION) at 11:07

## 2017-12-29 RX ADMIN — Medication 4 MILLIGRAM(S): at 06:14

## 2017-12-29 RX ADMIN — POLYETHYLENE GLYCOL 3350 17 GRAM(S): 17 POWDER, FOR SOLUTION ORAL at 11:55

## 2017-12-29 RX ADMIN — Medication 0.25 MILLIGRAM(S): at 06:14

## 2017-12-29 RX ADMIN — VALSARTAN 40 MILLIGRAM(S): 80 TABLET ORAL at 06:14

## 2017-12-29 RX ADMIN — PANTOPRAZOLE SODIUM 40 MILLIGRAM(S): 20 TABLET, DELAYED RELEASE ORAL at 06:14

## 2017-12-29 RX ADMIN — Medication 2: at 09:39

## 2017-12-29 NOTE — PROGRESS NOTE ADULT - PROBLEM SELECTOR PLAN 3
- pt with hx of rectal CA s/p chemoRx and RT, in remission as per pt. Follows Dr. Maxwell ([facility])   - planned for PET as outpatient  - pt requesting private GI. consult pending

## 2017-12-29 NOTE — PROGRESS NOTE ADULT - PROVIDER SPECIALTY LIST ADULT
Internal Medicine
Pulmonology
Gastroenterology
Pulmonology
Pulmonology
Internal Medicine

## 2017-12-29 NOTE — PROGRESS NOTE ADULT - SUBJECTIVE AND OBJECTIVE BOX
Patient is a 67y old  Female who presents with a chief complaint of SOB and epigastric discomfort (23 Dec 2017 16:31)      SUBJECTIVE / OVERNIGHT EVENTS:  Pt with ERIKA overnight. Pt has no complaints this AM, but endorses improvement of her productive cough. Pt denies any fevers/chills, CP, palpitations, SOB, N/V/D/C, melena, BRBPR, dysuria, hematuria.    MEDICATIONS  (STANDING):  ALBUTerol/ipratropium for Nebulization 3 milliLiter(s) Nebulizer four times a day  ascorbic acid 500 milliGRAM(s) Oral daily  buDESOnide 160 MICROgram(s)/formoterol 4.5 MICROgram(s) Inhaler 2 Puff(s) Inhalation two times a day  cholecalciferol 5000 Unit(s) Oral daily  dextrose 5%. 1000 milliLiter(s) (50 mL/Hr) IV Continuous <Continuous>  dextrose 50% Injectable 12.5 Gram(s) IV Push once  dextrose 50% Injectable 25 Gram(s) IV Push once  dextrose 50% Injectable 25 Gram(s) IV Push once  digoxin     Tablet 0.25 milliGRAM(s) Oral daily  docusate sodium 100 milliGRAM(s) Oral daily  famotidine    Tablet 20 milliGRAM(s) Oral daily  fluconAZOLE   Tablet 200 milliGRAM(s) Oral daily  hydrocortisone 0.5% Cream 1 Application(s) Topical two times a day  insulin glargine Injectable (LANTUS) 18 Unit(s) SubCutaneous at bedtime  insulin lispro (HumaLOG) corrective regimen sliding scale   SubCutaneous at bedtime  insulin lispro (HumaLOG) corrective regimen sliding scale   SubCutaneous three times a day before meals  insulin lispro Injectable (HumaLOG) 8 Unit(s) SubCutaneous three times a day before meals  melatonin 5 milliGRAM(s) Oral at bedtime  methylPREDNISolone 4 milliGRAM(s) Oral daily  montelukast 10 milliGRAM(s) Oral daily  ondansetron   Disintegrating Tablet 4 milliGRAM(s) Oral once  pantoprazole    Tablet 40 milliGRAM(s) Oral before breakfast  polyethylene glycol 3350 17 Gram(s) Oral daily  pregabalin 150 milliGRAM(s) Oral two times a day  senna 2 Tablet(s) Oral at bedtime  sodium chloride 0.9%. 1000 milliLiter(s) (75 mL/Hr) IV Continuous <Continuous>  sodium chloride 3%  Inhalation 4 milliLiter(s) Inhalation daily  tiotropium 18 MICROgram(s) Capsule 1 Capsule(s) Inhalation daily  valsartan 40 milliGRAM(s) Oral daily    MEDICATIONS  (PRN):  aluminum hydroxide/magnesium hydroxide/simethicone Suspension 30 milliLiter(s) Oral every 4 hours PRN Dyspepsia  dextrose Gel 1 Dose(s) Oral once PRN Blood Glucose LESS THAN 70 milliGRAM(s)/deciliter  glucagon  Injectable 1 milliGRAM(s) IntraMuscular once PRN Glucose LESS THAN 70 milligrams/deciliter      OBJECTIVE:    Vital Signs Last 24 Hrs  T(C): 36.7 (29 Dec 2017 04:38), Max: 36.7 (29 Dec 2017 04:38)  T(F): 98 (29 Dec 2017 04:38), Max: 98 (29 Dec 2017 04:38)  HR: 68 (29 Dec 2017 06:15) (65 - 82)  BP: 112/65 (29 Dec 2017 04:38) (107/66 - 139/73)  BP(mean): --  RR: 18 (29 Dec 2017 04:38) (18 - 18)  SpO2: 98% (29 Dec 2017 06:15) (97% - 100%)    CAPILLARY BLOOD GLUCOSE      POCT Blood Glucose.: 164 mg/dL (29 Dec 2017 08:33)  POCT Blood Glucose.: 136 mg/dL (28 Dec 2017 21:52)  POCT Blood Glucose.: 179 mg/dL (28 Dec 2017 17:33)  POCT Blood Glucose.: 140 mg/dL (28 Dec 2017 12:48)  POCT Blood Glucose.: 116 mg/dL (28 Dec 2017 08:59)    I&O's Summary    28 Dec 2017 07:01  -  29 Dec 2017 07:00  --------------------------------------------------------  IN: 240 mL / OUT: 0 mL / NET: 240 mL        PHYSICAL EXAM:  GENERAL: NAD, well-developed  HEAD:  Atraumatic, Normocephalic  EYES: EOMI, PERRLA, conjunctiva and sclera clear  Mouth: multiple white plaques on buccal mucosa  NECK: Supple, No JVD  CHEST/LUNG: prominent inspiratory and expiratory wheezes in the left lung field  HEART: Regular rate and rhythm; No murmurs, rubs, or gallops  ABDOMEN: Soft, Nontender, Nondistended; Bowel sounds present  EXTREMITIES:  2+ Peripheral Pulses, No clubbing, cyanosis, or edema  PSYCH: AAOx3  NEUROLOGY: non-focal  SKIN: No rashes or lesions    LABS:                        12.5   6.1   )-----------( 245      ( 27 Dec 2017 16:47 )             39.7     Auto Eosinophil # x     / Auto Eosinophil % x     / Auto Neutrophil # x     / Auto Neutrophil % x     / BANDS % x                        RESPIRATORY  VENT:    ABG:     VBG:     RADIOLOGY & ADDITIONAL TESTS:  (Imaging Personally Reviewed)    Consultant(s) Notes Reviewed:      Care Discussed with Consultants/Other Providers: Patient is a 67y old  Female who presents with a chief complaint of SOB and epigastric discomfort (23 Dec 2017 16:31)      SUBJECTIVE / OVERNIGHT EVENTS:  Pt with ERIKA overnight. Pt has no complaints this AM, but endorses improvement of her productive cough. Pt denies any fevers/chills, CP, palpitations, SOB, N/V/D/C, melena, BRBPR, dysuria, hematuria.    MEDICATIONS  (STANDING):  ALBUTerol/ipratropium for Nebulization 3 milliLiter(s) Nebulizer four times a day  ascorbic acid 500 milliGRAM(s) Oral daily  buDESOnide 160 MICROgram(s)/formoterol 4.5 MICROgram(s) Inhaler 2 Puff(s) Inhalation two times a day  cholecalciferol 5000 Unit(s) Oral daily  dextrose 5%. 1000 milliLiter(s) (50 mL/Hr) IV Continuous <Continuous>  dextrose 50% Injectable 12.5 Gram(s) IV Push once  dextrose 50% Injectable 25 Gram(s) IV Push once  dextrose 50% Injectable 25 Gram(s) IV Push once  digoxin     Tablet 0.25 milliGRAM(s) Oral daily  docusate sodium 100 milliGRAM(s) Oral daily  famotidine    Tablet 20 milliGRAM(s) Oral daily  fluconAZOLE   Tablet 200 milliGRAM(s) Oral daily  hydrocortisone 0.5% Cream 1 Application(s) Topical two times a day  insulin glargine Injectable (LANTUS) 18 Unit(s) SubCutaneous at bedtime  insulin lispro (HumaLOG) corrective regimen sliding scale   SubCutaneous at bedtime  insulin lispro (HumaLOG) corrective regimen sliding scale   SubCutaneous three times a day before meals  insulin lispro Injectable (HumaLOG) 8 Unit(s) SubCutaneous three times a day before meals  melatonin 5 milliGRAM(s) Oral at bedtime  methylPREDNISolone 4 milliGRAM(s) Oral daily  montelukast 10 milliGRAM(s) Oral daily  ondansetron   Disintegrating Tablet 4 milliGRAM(s) Oral once  pantoprazole    Tablet 40 milliGRAM(s) Oral before breakfast  polyethylene glycol 3350 17 Gram(s) Oral daily  pregabalin 150 milliGRAM(s) Oral two times a day  senna 2 Tablet(s) Oral at bedtime  sodium chloride 0.9%. 1000 milliLiter(s) (75 mL/Hr) IV Continuous <Continuous>  sodium chloride 3%  Inhalation 4 milliLiter(s) Inhalation daily  tiotropium 18 MICROgram(s) Capsule 1 Capsule(s) Inhalation daily  valsartan 40 milliGRAM(s) Oral daily    MEDICATIONS  (PRN):  aluminum hydroxide/magnesium hydroxide/simethicone Suspension 30 milliLiter(s) Oral every 4 hours PRN Dyspepsia  dextrose Gel 1 Dose(s) Oral once PRN Blood Glucose LESS THAN 70 milliGRAM(s)/deciliter  glucagon  Injectable 1 milliGRAM(s) IntraMuscular once PRN Glucose LESS THAN 70 milligrams/deciliter      OBJECTIVE:    Vital Signs Last 24 Hrs  T(C): 36.7 (29 Dec 2017 04:38), Max: 36.7 (29 Dec 2017 04:38)  T(F): 98 (29 Dec 2017 04:38), Max: 98 (29 Dec 2017 04:38)  HR: 68 (29 Dec 2017 06:15) (65 - 82)  BP: 112/65 (29 Dec 2017 04:38) (107/66 - 139/73)  BP(mean): --  RR: 18 (29 Dec 2017 04:38) (18 - 18)  SpO2: 98% (29 Dec 2017 06:15) (97% - 100%)    CAPILLARY BLOOD GLUCOSE      POCT Blood Glucose.: 164 mg/dL (29 Dec 2017 08:33)  POCT Blood Glucose.: 136 mg/dL (28 Dec 2017 21:52)  POCT Blood Glucose.: 179 mg/dL (28 Dec 2017 17:33)  POCT Blood Glucose.: 140 mg/dL (28 Dec 2017 12:48)  POCT Blood Glucose.: 116 mg/dL (28 Dec 2017 08:59)    I&O's Summary    28 Dec 2017 07:01  -  29 Dec 2017 07:00  --------------------------------------------------------  IN: 240 mL / OUT: 0 mL / NET: 240 mL        PHYSICAL EXAM:  GENERAL: NAD, well-developed  HEAD:  Atraumatic, Normocephalic  EYES: EOMI, PERRLA, conjunctiva and sclera clear  Mouth: multiple white plaques on buccal mucosa (improving)  NECK: Supple, No JVD  CHEST/LUNG: prominent inspiratory and expiratory wheezes in the left lung field  HEART: Regular rate and rhythm; No murmurs, rubs, or gallops  ABDOMEN: Soft, Nontender, Nondistended; Bowel sounds present  EXTREMITIES:  2+ Peripheral Pulses, No clubbing, cyanosis, or edema  PSYCH: AAOx3  NEUROLOGY: non-focal  SKIN: No rashes or lesions    LABS:                        12.5   6.1   )-----------( 245      ( 27 Dec 2017 16:47 )             39.7     Auto Eosinophil # x     / Auto Eosinophil % x     / Auto Neutrophil # x     / Auto Neutrophil % x     / BANDS % x                        RESPIRATORY  VENT:    ABG:     VBG:     RADIOLOGY & ADDITIONAL TESTS:  (Imaging Personally Reviewed)    Consultant(s) Notes Reviewed:      Care Discussed with Consultants/Other Providers:

## 2017-12-29 NOTE — PROGRESS NOTE ADULT - SUBJECTIVE AND OBJECTIVE BOX
CHIEF COMPLAINT:better-"great"--still cough    Interval Events: ambulated-sub xyphoid discomfort    REVIEW OF SYSTEMS:  Constitutional: No fevers or chills. No weight loss. No fatigue or generalized malaise.  Eyes: No itching or discharge from the eyes  ENT: No ear pain. No ear discharge. No nasal congestion. No post nasal drip. No epistaxis. No throat pain. No sore throat. No difficulty swallowing.   CV: No chest pain. No palpitations. No lightheadedness or dizziness.   Resp: No dyspnea at rest. + dyspnea on exertion. No orthopnea. + wheezing. + cough. No stridor. minimal sputum production. No chest pain with respiration.  GI: No nausea. No vomiting. No diarrhea.  MSK: No joint pain or pain in any extremities  Integumentary: No skin lesions. No pedal edema.  Neurological: No gross motor weakness. No sensory changes.  [+ ] All other systems negative  [ ] Unable to assess ROS because ________    OBJECTIVE:  ICU Vital Signs Last 24 Hrs  T(C): 36.7 (29 Dec 2017 04:38), Max: 36.7 (29 Dec 2017 04:38)  T(F): 98 (29 Dec 2017 04:38), Max: 98 (29 Dec 2017 04:38)  HR: 65 (29 Dec 2017 04:38) (65 - 82)  BP: 112/65 (29 Dec 2017 04:38) (107/66 - 139/73)  BP(mean): --  ABP: --  ABP(mean): --  RR: 18 (29 Dec 2017 04:38) (18 - 18)  SpO2: 98% (29 Dec 2017 04:38) (97% - 100%)        12-27 @ 07:01  -  12-28 @ 07:00  --------------------------------------------------------  IN: 360 mL / OUT: 500 mL / NET: -140 mL    12-28 @ 07:01 - 12-29 @ 05:51  --------------------------------------------------------  IN: 240 mL / OUT: 0 mL / NET: 240 mL      CAPILLARY BLOOD GLUCOSE      POCT Blood Glucose.: 136 mg/dL (28 Dec 2017 21:52)      PHYSICAL EXAM: NAD in bed  General: Awake, alert, oriented X 3.   HEENT: Atraumatic, normocephalic.                 Mallampatti Grade 2                No nasal congestion.                No tonsillar or pharyngeal exudates.  Lymph Nodes: No palpable lymphadenopathy  Neck: No JVD. No carotid bruit.   Respiratory: Normal chest expansion                         Normal percussion                         Normal and equal air entry                         ++ wheeze rare rhonchi but ai.  Cardiovascular: S1 S2 normal. + murmurs,no rubs or gallops.   Abdomen: Soft, non-tender, non-distended. No organomegaly.  Extremities: Warm to touch. Peripheral pulse palpable. No pedal edema.   Skin: No rashes or skin lesions  Neurological: Motor and sensory examination equal and normal in all four extremities.  Psychiatry: Appropriate mood and affect.    HOSPITAL MEDICATIONS:  MEDICATIONS  (STANDING):  ALBUTerol/ipratropium for Nebulization 3 milliLiter(s) Nebulizer four times a day  ascorbic acid 500 milliGRAM(s) Oral daily  buDESOnide 160 MICROgram(s)/formoterol 4.5 MICROgram(s) Inhaler 2 Puff(s) Inhalation two times a day  cholecalciferol 5000 Unit(s) Oral daily  dextrose 5%. 1000 milliLiter(s) (50 mL/Hr) IV Continuous <Continuous>  dextrose 50% Injectable 12.5 Gram(s) IV Push once  dextrose 50% Injectable 25 Gram(s) IV Push once  dextrose 50% Injectable 25 Gram(s) IV Push once  digoxin     Tablet 0.25 milliGRAM(s) Oral daily  docusate sodium 100 milliGRAM(s) Oral daily  famotidine    Tablet 20 milliGRAM(s) Oral daily  fluconAZOLE   Tablet 200 milliGRAM(s) Oral daily  hydrocortisone 0.5% Cream 1 Application(s) Topical two times a day  insulin glargine Injectable (LANTUS) 18 Unit(s) SubCutaneous at bedtime  insulin lispro (HumaLOG) corrective regimen sliding scale   SubCutaneous at bedtime  insulin lispro (HumaLOG) corrective regimen sliding scale   SubCutaneous three times a day before meals  insulin lispro Injectable (HumaLOG) 8 Unit(s) SubCutaneous three times a day before meals  melatonin 5 milliGRAM(s) Oral at bedtime  methylPREDNISolone 4 milliGRAM(s) Oral daily  montelukast 10 milliGRAM(s) Oral daily  ondansetron   Disintegrating Tablet 4 milliGRAM(s) Oral once  pantoprazole    Tablet 40 milliGRAM(s) Oral before breakfast  polyethylene glycol 3350 17 Gram(s) Oral daily  pregabalin 150 milliGRAM(s) Oral two times a day  senna 2 Tablet(s) Oral at bedtime  sodium chloride 0.9%. 1000 milliLiter(s) (75 mL/Hr) IV Continuous <Continuous>  sodium chloride 3%  Inhalation 4 milliLiter(s) Inhalation daily  tiotropium 18 MICROgram(s) Capsule 1 Capsule(s) Inhalation daily  trimethoprim  160 mG/sulfamethoxazole 800 mG 1 Tablet(s) Oral two times a day  valsartan 40 milliGRAM(s) Oral daily    MEDICATIONS  (PRN):  aluminum hydroxide/magnesium hydroxide/simethicone Suspension 30 milliLiter(s) Oral every 4 hours PRN Dyspepsia  dextrose Gel 1 Dose(s) Oral once PRN Blood Glucose LESS THAN 70 milliGRAM(s)/deciliter  glucagon  Injectable 1 milliGRAM(s) IntraMuscular once PRN Glucose LESS THAN 70 milligrams/deciliter      LABS:                        12.5   6.1   )-----------( 245      ( 27 Dec 2017 16:47 )             39.7     12-27    143  |  103  |  10  ----------------------------<  224<H>  4.1   |  30  |  0.77    Ca    8.6      27 Dec 2017 06:45  Phos  3.4     12-27  Mg     2.2     12-27                MICROBIOLOGY:     RADIOLOGY:  [ ] Reviewed and interpreted by me    Point of Care Ultrasound Findings:    PFT:    EKG:

## 2017-12-29 NOTE — PROGRESS NOTE ADULT - ATTENDING COMMENTS
as above--standard use of acapella/chest pt by vest as well--no absolute pulm. contras to GI procedures as out pt.  GI workup is in progress--await path of egd, formal GI evaluation to be done as out pt.  Complete abx --resend sputum  DC likely on her regular rx    Eulalio Allison MD-Pulmonary   [phone]

## 2017-12-29 NOTE — PROGRESS NOTE ADULT - PROBLEM SELECTOR PROBLEM 1
Chronic obstructive pulmonary disease with acute exacerbation
Epigastric abdominal pain
Large bowel obstruction
SOB (shortness of breath)
Large bowel obstruction

## 2017-12-29 NOTE — PROGRESS NOTE ADULT - PROBLEM SELECTOR PLAN 9
- BP well controlled  - C/w Valsartan 40mg  - will continue to monitor
- BP well controlled  - C/w Valsartan 40mg  - will continue to monitor
- DVT ppx: Eliquis  - DISPO: PT  - Diet: NPO    Mark Kaur, PGY-1  Care Model B  Pager [phone]
- DVT ppx: Eliquis  - DISPO: d/c to home  - Diet: CC DASH diet    Mark Kaur, PGY-1  Care Model B  Pager [phone]
- DVT ppx: Eliquis  - DISPO: PT  - Diet: CC DASH, NPO at midnight     Mark Kaur, PGY-1  Care Model B  Pager [phone]

## 2017-12-29 NOTE — PROGRESS NOTE ADULT - PROBLEM SELECTOR PROBLEM 2
Chronic obstructive pulmonary disease with acute exacerbation
Epigastric abdominal pain
Tracheobronchomalacia
Epigastric abdominal pain

## 2017-12-29 NOTE — PROGRESS NOTE ADULT - PROBLEM SELECTOR PLAN 1
- given patient's CT findings of a pancreatic cyst that was seen in a prior CT in 2016 which are unchanged, she will need MRI outpatient for monitoring.   - Diabetic gastroparesis also a possibility; trial of Reglan started however patient did not like medication and is declining medication at this time  - c/w Pepcid and maalox  - path report reveals minimal chronic inactive gastritis

## 2018-01-01 LAB
-  AMIKACIN: SIGNIFICANT CHANGE UP
-  AZTREONAM: SIGNIFICANT CHANGE UP
-  CEFEPIME: SIGNIFICANT CHANGE UP
-  CEFTAZIDIME: SIGNIFICANT CHANGE UP
-  CEFTRIAXONE: SIGNIFICANT CHANGE UP
-  CIPROFLOXACIN: SIGNIFICANT CHANGE UP
-  GENTAMICIN: SIGNIFICANT CHANGE UP
-  IMIPENEM: SIGNIFICANT CHANGE UP
-  LEVOFLOXACIN: SIGNIFICANT CHANGE UP
-  MEROPENEM: SIGNIFICANT CHANGE UP
-  PIPERACILLIN/TAZOBACTAM: SIGNIFICANT CHANGE UP
-  TOBRAMYCIN: SIGNIFICANT CHANGE UP
-  TRIMETHOPRIM/SULFAMETHOXAZOLE: SIGNIFICANT CHANGE UP
CULTURE RESULTS: SIGNIFICANT CHANGE UP
METHOD TYPE: SIGNIFICANT CHANGE UP
ORGANISM # SPEC MICROSCOPIC CNT: SIGNIFICANT CHANGE UP
ORGANISM # SPEC MICROSCOPIC CNT: SIGNIFICANT CHANGE UP
SPECIMEN SOURCE: SIGNIFICANT CHANGE UP

## 2018-01-04 ENCOUNTER — FORM ENCOUNTER (OUTPATIENT)
Age: 68
End: 2018-01-04

## 2018-01-04 ENCOUNTER — MESSAGE (OUTPATIENT)
Age: 68
End: 2018-01-04

## 2018-01-05 ENCOUNTER — OUTPATIENT (OUTPATIENT)
Dept: OUTPATIENT SERVICES | Facility: HOSPITAL | Age: 68
LOS: 1 days | End: 2018-01-05
Payer: MEDICARE

## 2018-01-05 ENCOUNTER — APPOINTMENT (OUTPATIENT)
Dept: NUCLEAR MEDICINE | Facility: IMAGING CENTER | Age: 68
End: 2018-01-05
Payer: MEDICARE

## 2018-01-05 ENCOUNTER — APPOINTMENT (OUTPATIENT)
Dept: PULMONOLOGY | Facility: CLINIC | Age: 68
End: 2018-01-05

## 2018-01-05 DIAGNOSIS — Z98.89 OTHER SPECIFIED POSTPROCEDURAL STATES: Chronic | ICD-10-CM

## 2018-01-05 DIAGNOSIS — H05.352 EXOSTOSIS OF LEFT ORBIT: Chronic | ICD-10-CM

## 2018-01-05 DIAGNOSIS — Z96.653 PRESENCE OF ARTIFICIAL KNEE JOINT, BILATERAL: Chronic | ICD-10-CM

## 2018-01-05 DIAGNOSIS — Z00.8 ENCOUNTER FOR OTHER GENERAL EXAMINATION: ICD-10-CM

## 2018-01-05 DIAGNOSIS — C21.1 MALIGNANT NEOPLASM OF ANAL CANAL: ICD-10-CM

## 2018-01-05 PROCEDURE — 78815 PET IMAGE W/CT SKULL-THIGH: CPT | Mod: 26,PS

## 2018-01-05 PROCEDURE — A9552: CPT

## 2018-01-05 PROCEDURE — 78815 PET IMAGE W/CT SKULL-THIGH: CPT

## 2018-01-08 PROBLEM — J98.8 RESPIRATORY INFECTION: Status: RESOLVED | Noted: 2017-12-13 | Resolved: 2018-01-08

## 2018-01-08 PROBLEM — Z86.2 HISTORY OF IMMUNODEFICIENCY: Status: RESOLVED | Noted: 2017-12-04 | Resolved: 2018-01-08

## 2018-01-09 ENCOUNTER — APPOINTMENT (OUTPATIENT)
Dept: RADIATION ONCOLOGY | Facility: CLINIC | Age: 68
End: 2018-01-09
Payer: MEDICARE

## 2018-01-09 ENCOUNTER — APPOINTMENT (OUTPATIENT)
Dept: HEMATOLOGY ONCOLOGY | Facility: CLINIC | Age: 68
End: 2018-01-09
Payer: MEDICARE

## 2018-01-09 ENCOUNTER — CHART COPY (OUTPATIENT)
Age: 68
End: 2018-01-09

## 2018-01-09 VITALS
HEART RATE: 95 BPM | OXYGEN SATURATION: 94 % | RESPIRATION RATE: 24 BRPM | TEMPERATURE: 37.2 F | SYSTOLIC BLOOD PRESSURE: 126 MMHG | DIASTOLIC BLOOD PRESSURE: 79 MMHG

## 2018-01-09 DIAGNOSIS — M54.9 DORSALGIA, UNSPECIFIED: ICD-10-CM

## 2018-01-09 PROCEDURE — 99215 OFFICE O/P EST HI 40 MIN: CPT

## 2018-01-09 PROCEDURE — 99214 OFFICE O/P EST MOD 30 MIN: CPT | Mod: GC

## 2018-01-09 RX ORDER — SULFAMETHOXAZOLE AND TRIMETHOPRIM 800; 160 MG/1; MG/1
800-160 TABLET ORAL TWICE DAILY
Qty: 20 | Refills: 0 | Status: DISCONTINUED | COMMUNITY
Start: 2017-12-13 | End: 2018-01-09

## 2018-01-10 ENCOUNTER — APPOINTMENT (OUTPATIENT)
Dept: PULMONOLOGY | Facility: CLINIC | Age: 68
End: 2018-01-10
Payer: MEDICARE

## 2018-01-10 VITALS
DIASTOLIC BLOOD PRESSURE: 68 MMHG | BODY MASS INDEX: 25.9 KG/M2 | WEIGHT: 165 LBS | HEIGHT: 67 IN | HEART RATE: 87 BPM | OXYGEN SATURATION: 98 % | SYSTOLIC BLOOD PRESSURE: 120 MMHG | RESPIRATION RATE: 17 BRPM

## 2018-01-10 PROCEDURE — 99214 OFFICE O/P EST MOD 30 MIN: CPT | Mod: 25

## 2018-01-10 PROCEDURE — 96372 THER/PROPH/DIAG INJ SC/IM: CPT

## 2018-01-10 PROCEDURE — 71046 X-RAY EXAM CHEST 2 VIEWS: CPT

## 2018-01-10 RX ORDER — OMALIZUMAB 202.5 MG/1.4ML
150 INJECTION, SOLUTION SUBCUTANEOUS
Qty: 1 | Refills: 0 | Status: COMPLETED | OUTPATIENT
Start: 2018-01-10

## 2018-01-10 RX ADMIN — OMALIZUMAB 1.5 MG: 202.5 INJECTION, SOLUTION SUBCUTANEOUS at 00:00

## 2018-01-11 ENCOUNTER — OUTPATIENT (OUTPATIENT)
Dept: OUTPATIENT SERVICES | Facility: HOSPITAL | Age: 68
LOS: 1 days | End: 2018-01-11
Payer: MEDICARE

## 2018-01-11 ENCOUNTER — APPOINTMENT (OUTPATIENT)
Dept: THORACIC SURGERY | Facility: CLINIC | Age: 68
End: 2018-01-11
Payer: MEDICARE

## 2018-01-11 VITALS
OXYGEN SATURATION: 92 % | RESPIRATION RATE: 20 BRPM | HEART RATE: 97 BPM | DIASTOLIC BLOOD PRESSURE: 72 MMHG | SYSTOLIC BLOOD PRESSURE: 135 MMHG | TEMPERATURE: 100.1 F

## 2018-01-11 DIAGNOSIS — Z98.89 OTHER SPECIFIED POSTPROCEDURAL STATES: Chronic | ICD-10-CM

## 2018-01-11 DIAGNOSIS — J98.8 OTHER SPECIFIED RESPIRATORY DISORDERS: ICD-10-CM

## 2018-01-11 DIAGNOSIS — H05.352 EXOSTOSIS OF LEFT ORBIT: Chronic | ICD-10-CM

## 2018-01-11 DIAGNOSIS — Z96.653 PRESENCE OF ARTIFICIAL KNEE JOINT, BILATERAL: Chronic | ICD-10-CM

## 2018-01-11 DIAGNOSIS — Z86.2 PERSONAL HISTORY OF DISEASES OF THE BLOOD AND BLOOD-FORMING ORGANS AND CERTAIN DISORDERS INVOLVING THE IMMUNE MECHANISM: ICD-10-CM

## 2018-01-11 LAB
ALBUMIN SERPL ELPH-MCNC: 3.7 G/DL — SIGNIFICANT CHANGE UP (ref 3.3–5)
ALP SERPL-CCNC: 85 U/L — SIGNIFICANT CHANGE UP (ref 40–120)
ALT FLD-CCNC: 10 U/L — SIGNIFICANT CHANGE UP (ref 10–45)
ANION GAP SERPL CALC-SCNC: 16 MMOL/L — SIGNIFICANT CHANGE UP (ref 5–17)
AST SERPL-CCNC: 12 U/L — SIGNIFICANT CHANGE UP (ref 10–40)
BILIRUB SERPL-MCNC: 0.2 MG/DL — SIGNIFICANT CHANGE UP (ref 0.2–1.2)
BUN SERPL-MCNC: 11 MG/DL — SIGNIFICANT CHANGE UP (ref 7–23)
CALCIUM SERPL-MCNC: 9 MG/DL — SIGNIFICANT CHANGE UP (ref 8.4–10.5)
CHLORIDE SERPL-SCNC: 98 MMOL/L — SIGNIFICANT CHANGE UP (ref 96–108)
CO2 SERPL-SCNC: 23 MMOL/L — SIGNIFICANT CHANGE UP (ref 22–31)
CREAT SERPL-MCNC: 0.69 MG/DL — SIGNIFICANT CHANGE UP (ref 0.5–1.3)
GLUCOSE SERPL-MCNC: 266 MG/DL — HIGH (ref 70–99)
POTASSIUM SERPL-MCNC: 4.4 MMOL/L — SIGNIFICANT CHANGE UP (ref 3.5–5.3)
POTASSIUM SERPL-SCNC: 4.4 MMOL/L — SIGNIFICANT CHANGE UP (ref 3.5–5.3)
PROT SERPL-MCNC: 7.2 G/DL — SIGNIFICANT CHANGE UP (ref 6–8.3)
SODIUM SERPL-SCNC: 137 MMOL/L — SIGNIFICANT CHANGE UP (ref 135–145)
T4 AB SER-ACNC: 8.37 UG/DL — SIGNIFICANT CHANGE UP (ref 3.17–11.72)

## 2018-01-11 PROCEDURE — 80053 COMPREHEN METABOLIC PANEL: CPT

## 2018-01-11 PROCEDURE — 82784 ASSAY IGA/IGD/IGG/IGM EACH: CPT

## 2018-01-11 PROCEDURE — 87070 CULTURE OTHR SPECIMN AEROBIC: CPT

## 2018-01-11 PROCEDURE — 86317 IMMUNOASSAY INFECTIOUS AGENT: CPT

## 2018-01-11 PROCEDURE — 82652 VIT D 1 25-DIHYDROXY: CPT

## 2018-01-11 PROCEDURE — 36415 COLL VENOUS BLD VENIPUNCTURE: CPT

## 2018-01-11 PROCEDURE — 87184 SC STD DISK METHOD PER PLATE: CPT

## 2018-01-11 PROCEDURE — 99213 OFFICE O/P EST LOW 20 MIN: CPT

## 2018-01-11 PROCEDURE — 86581 STRPTCS PNEUM ANTB SEROT IA: CPT

## 2018-01-11 PROCEDURE — 84436 ASSAY OF TOTAL THYROXINE: CPT

## 2018-01-11 PROCEDURE — 84480 ASSAY TRIIODOTHYRONINE (T3): CPT

## 2018-01-11 PROCEDURE — 82306 VITAMIN D 25 HYDROXY: CPT

## 2018-01-12 ENCOUNTER — APPOINTMENT (OUTPATIENT)
Dept: COLORECTAL SURGERY | Facility: CLINIC | Age: 68
End: 2018-01-12
Payer: MEDICARE

## 2018-01-12 LAB
24R-OH-CALCIDIOL SERPL-MCNC: 30.5 NG/ML — SIGNIFICANT CHANGE UP (ref 30–80)
GRAM STN FLD: SIGNIFICANT CHANGE UP
SPECIMEN SOURCE: SIGNIFICANT CHANGE UP
VIT D25+D1,25 OH+D1,25 PNL SERPL-MCNC: 55.5 PG/ML — SIGNIFICANT CHANGE UP (ref 19.9–79.3)

## 2018-01-12 PROCEDURE — 99213 OFFICE O/P EST LOW 20 MIN: CPT

## 2018-01-13 DIAGNOSIS — Z01.818 ENCOUNTER FOR OTHER PREPROCEDURAL EXAMINATION: ICD-10-CM

## 2018-01-13 DIAGNOSIS — J39.8 OTHER SPECIFIED DISEASES OF UPPER RESPIRATORY TRACT: ICD-10-CM

## 2018-01-13 LAB
IGA FLD-MCNC: 544 MG/DL — HIGH (ref 68–378)
IGG FLD-MCNC: 681 MG/DL — LOW (ref 694–1618)
IGM SERPL-MCNC: 158 MG/DL — SIGNIFICANT CHANGE UP (ref 40–230)
KAPPA LC SER QL IFE: 1.76 MG/DL — SIGNIFICANT CHANGE UP (ref 0.33–1.94)
KAPPA/LAMBDA FREE LIGHT CHAIN RATIO, SERUM: 1.04 RATIO — SIGNIFICANT CHANGE UP (ref 0.26–1.65)
LAMBDA LC SER QL IFE: 1.7 MG/DL — SIGNIFICANT CHANGE UP (ref 0.57–2.63)

## 2018-01-14 LAB
-  AMPICILLIN: SIGNIFICANT CHANGE UP
-  CLINDAMYCIN: SIGNIFICANT CHANGE UP
-  ERYTHROMYCIN: SIGNIFICANT CHANGE UP
-  PENICILLIN: SIGNIFICANT CHANGE UP
-  VANCOMYCIN: SIGNIFICANT CHANGE UP
CULTURE RESULTS: SIGNIFICANT CHANGE UP
METHOD TYPE: SIGNIFICANT CHANGE UP
ORGANISM # SPEC MICROSCOPIC CNT: SIGNIFICANT CHANGE UP
ORGANISM # SPEC MICROSCOPIC CNT: SIGNIFICANT CHANGE UP
SPECIMEN SOURCE: SIGNIFICANT CHANGE UP

## 2018-01-15 LAB — T3 SERPL-MCNC: 109 NG/DL — SIGNIFICANT CHANGE UP (ref 80–200)

## 2018-01-16 ENCOUNTER — FORM ENCOUNTER (OUTPATIENT)
Age: 68
End: 2018-01-16

## 2018-01-16 ENCOUNTER — APPOINTMENT (OUTPATIENT)
Dept: INTERNAL MEDICINE | Facility: CLINIC | Age: 68
End: 2018-01-16
Payer: MEDICARE

## 2018-01-16 VITALS
SYSTOLIC BLOOD PRESSURE: 128 MMHG | WEIGHT: 165 LBS | HEART RATE: 78 BPM | HEIGHT: 67 IN | DIASTOLIC BLOOD PRESSURE: 79 MMHG | TEMPERATURE: 98.7 F | OXYGEN SATURATION: 94 % | BODY MASS INDEX: 25.9 KG/M2 | RESPIRATION RATE: 20 BRPM

## 2018-01-16 PROCEDURE — 99205 OFFICE O/P NEW HI 60 MIN: CPT

## 2018-01-17 ENCOUNTER — APPOINTMENT (OUTPATIENT)
Dept: MRI IMAGING | Facility: IMAGING CENTER | Age: 68
End: 2018-01-17
Payer: MEDICARE

## 2018-01-17 ENCOUNTER — OUTPATIENT (OUTPATIENT)
Dept: OUTPATIENT SERVICES | Facility: HOSPITAL | Age: 68
LOS: 1 days | End: 2018-01-17
Payer: MEDICARE

## 2018-01-17 DIAGNOSIS — H05.352 EXOSTOSIS OF LEFT ORBIT: Chronic | ICD-10-CM

## 2018-01-17 DIAGNOSIS — Z98.89 OTHER SPECIFIED POSTPROCEDURAL STATES: Chronic | ICD-10-CM

## 2018-01-17 DIAGNOSIS — F99 MENTAL DISORDER, NOT OTHERWISE SPECIFIED: ICD-10-CM

## 2018-01-17 DIAGNOSIS — Z96.653 PRESENCE OF ARTIFICIAL KNEE JOINT, BILATERAL: Chronic | ICD-10-CM

## 2018-01-17 LAB
DEPRECATED S PNEUM 1 IGG SER-MCNC: 56.6 MCG/ML — SIGNIFICANT CHANGE UP
DEPRECATED S PNEUM12 IGG SER-MCNC: 0.5 MCG/ML — SIGNIFICANT CHANGE UP
DEPRECATED S PNEUM14 IGG SER-MCNC: 23.7 MCG/ML — SIGNIFICANT CHANGE UP
DEPRECATED S PNEUM17 IGG SER-MCNC: 6.6 MCG/ML — SIGNIFICANT CHANGE UP
DEPRECATED S PNEUM19 IGG SER-MCNC: 3.9 MCG/ML — SIGNIFICANT CHANGE UP
DEPRECATED S PNEUM19 IGG SER-MCNC: 37.8 MCG/ML — SIGNIFICANT CHANGE UP
DEPRECATED S PNEUM2 IGG SER-MCNC: 0.1 MCG/ML — SIGNIFICANT CHANGE UP
DEPRECATED S PNEUM20 IGG SER-MCNC: 0.2 MCG/ML — SIGNIFICANT CHANGE UP
DEPRECATED S PNEUM22 IGG SER-MCNC: 4.2 MCG/ML — SIGNIFICANT CHANGE UP
DEPRECATED S PNEUM23 IGG SER-MCNC: 4.8 MCG/ML — SIGNIFICANT CHANGE UP
DEPRECATED S PNEUM3 IGG SER-MCNC: 1.6 MCG/ML — SIGNIFICANT CHANGE UP
DEPRECATED S PNEUM4 IGG SER-MCNC: 0.2 MCG/ML — SIGNIFICANT CHANGE UP
DEPRECATED S PNEUM5 IGG SER-MCNC: 127.4 MCG/ML — SIGNIFICANT CHANGE UP
DEPRECATED S PNEUM8 IGG SER-MCNC: 0.4 MCG/ML — SIGNIFICANT CHANGE UP
DEPRECATED S PNEUM9 IGG SER-MCNC: 0.3 MCG/ML — SIGNIFICANT CHANGE UP
DEPRECATED S PNEUM9 IGG SER-MCNC: 0.8 MCG/ML — SIGNIFICANT CHANGE UP
IGG4 SER-MCNC: 11.9 MG/DL — SIGNIFICANT CHANGE UP (ref 2.4–121)
S PNEUM SEROTYPE IGG SER-IMP: 0.3 MCG/ML — SIGNIFICANT CHANGE UP
S PNEUM SEROTYPE IGG SER-IMP: 0.4 MCG/ML — SIGNIFICANT CHANGE UP
S PNEUM SEROTYPE IGG SER-IMP: 0.8 MCG/ML — SIGNIFICANT CHANGE UP
S PNEUM SEROTYPE IGG SER-IMP: 1.3 MCG/ML — SIGNIFICANT CHANGE UP
S PNEUM SEROTYPE IGG SER-IMP: 1.6 MCG/ML — SIGNIFICANT CHANGE UP
S PNEUM SEROTYPE IGG SER-IMP: 6.8 MCG/ML — SIGNIFICANT CHANGE UP
S PNEUM SEROTYPE IGG SER-IMP: SIGNIFICANT CHANGE UP MCG/ML

## 2018-01-17 PROCEDURE — 82565 ASSAY OF CREATININE: CPT

## 2018-01-17 PROCEDURE — 70553 MRI BRAIN STEM W/O & W/DYE: CPT

## 2018-01-17 PROCEDURE — 70553 MRI BRAIN STEM W/O & W/DYE: CPT | Mod: 26

## 2018-01-17 PROCEDURE — A9585: CPT

## 2018-01-18 ENCOUNTER — APPOINTMENT (OUTPATIENT)
Dept: NEUROLOGY | Facility: CLINIC | Age: 68
End: 2018-01-18
Payer: MEDICARE

## 2018-01-18 VITALS
BODY MASS INDEX: 25.9 KG/M2 | DIASTOLIC BLOOD PRESSURE: 62 MMHG | HEIGHT: 67 IN | HEART RATE: 86 BPM | WEIGHT: 165 LBS | SYSTOLIC BLOOD PRESSURE: 130 MMHG

## 2018-01-18 PROCEDURE — 99215 OFFICE O/P EST HI 40 MIN: CPT

## 2018-01-18 NOTE — ED PROVIDER NOTE - PROGRESS NOTE
Keyshawn Minaya  : 1940 [facility], [address] [facility], [address]  [phone]   [phone]          OUTPATIENT DAILY NOTE    NAME/AGE/GENDER: Keyshawn Minaya is a 76 y.o. female. DATE: 2018    Patient was not seen due to the clinic being closed for inclement weather.      RICCI CampaT
Improved.

## 2018-01-19 ENCOUNTER — APPOINTMENT (OUTPATIENT)
Dept: HEART AND VASCULAR | Facility: CLINIC | Age: 68
End: 2018-01-19
Payer: MEDICARE

## 2018-01-19 VITALS
HEIGHT: 67 IN | WEIGHT: 161.5 LBS | SYSTOLIC BLOOD PRESSURE: 128 MMHG | RESPIRATION RATE: 12 BRPM | DIASTOLIC BLOOD PRESSURE: 72 MMHG | TEMPERATURE: 97.8 F | BODY MASS INDEX: 25.35 KG/M2 | HEART RATE: 75 BPM | OXYGEN SATURATION: 97 %

## 2018-01-19 PROCEDURE — 99215 OFFICE O/P EST HI 40 MIN: CPT | Mod: 25

## 2018-01-19 PROCEDURE — 93880 EXTRACRANIAL BILAT STUDY: CPT | Mod: XE

## 2018-01-19 PROCEDURE — 93306 TTE W/DOPPLER COMPLETE: CPT

## 2018-01-24 ENCOUNTER — APPOINTMENT (OUTPATIENT)
Dept: PULMONOLOGY | Facility: CLINIC | Age: 68
End: 2018-01-24
Payer: MEDICARE

## 2018-01-24 ENCOUNTER — APPOINTMENT (OUTPATIENT)
Dept: NEUROLOGY | Facility: CLINIC | Age: 68
End: 2018-01-24
Payer: MEDICARE

## 2018-01-24 VITALS
BODY MASS INDEX: 25.27 KG/M2 | OXYGEN SATURATION: 96 % | DIASTOLIC BLOOD PRESSURE: 60 MMHG | RESPIRATION RATE: 16 BRPM | HEART RATE: 102 BPM | SYSTOLIC BLOOD PRESSURE: 120 MMHG | WEIGHT: 161 LBS | HEIGHT: 67 IN

## 2018-01-24 PROCEDURE — 95819 EEG AWAKE AND ASLEEP: CPT

## 2018-01-24 PROCEDURE — 96372 THER/PROPH/DIAG INJ SC/IM: CPT

## 2018-01-24 RX ORDER — OMALIZUMAB 202.5 MG/1.4ML
150 INJECTION, SOLUTION SUBCUTANEOUS
Qty: 45 | Refills: 0 | Status: COMPLETED | OUTPATIENT
Start: 2018-01-24

## 2018-01-24 RX ADMIN — OMALIZUMAB 0 MG: 202.5 INJECTION, SOLUTION SUBCUTANEOUS at 00:00

## 2018-01-25 PROCEDURE — 95953: CPT

## 2018-01-31 ENCOUNTER — OUTPATIENT (OUTPATIENT)
Dept: OUTPATIENT SERVICES | Facility: HOSPITAL | Age: 68
LOS: 1 days | End: 2018-01-31
Payer: MEDICARE

## 2018-01-31 VITALS
RESPIRATION RATE: 16 BRPM | HEART RATE: 89 BPM | SYSTOLIC BLOOD PRESSURE: 117 MMHG | TEMPERATURE: 97 F | OXYGEN SATURATION: 96 % | HEIGHT: 67.5 IN | WEIGHT: 160.06 LBS | DIASTOLIC BLOOD PRESSURE: 79 MMHG

## 2018-01-31 DIAGNOSIS — K62.5 HEMORRHAGE OF ANUS AND RECTUM: ICD-10-CM

## 2018-01-31 DIAGNOSIS — I48.91 UNSPECIFIED ATRIAL FIBRILLATION: ICD-10-CM

## 2018-01-31 DIAGNOSIS — Z98.89 OTHER SPECIFIED POSTPROCEDURAL STATES: Chronic | ICD-10-CM

## 2018-01-31 DIAGNOSIS — Z96.653 PRESENCE OF ARTIFICIAL KNEE JOINT, BILATERAL: Chronic | ICD-10-CM

## 2018-01-31 DIAGNOSIS — Z01.818 ENCOUNTER FOR OTHER PREPROCEDURAL EXAMINATION: ICD-10-CM

## 2018-01-31 DIAGNOSIS — E27.40 UNSPECIFIED ADRENOCORTICAL INSUFFICIENCY: ICD-10-CM

## 2018-01-31 DIAGNOSIS — J45.909 UNSPECIFIED ASTHMA, UNCOMPLICATED: ICD-10-CM

## 2018-01-31 DIAGNOSIS — K61.1 RECTAL ABSCESS: ICD-10-CM

## 2018-01-31 DIAGNOSIS — E11.9 TYPE 2 DIABETES MELLITUS WITHOUT COMPLICATIONS: ICD-10-CM

## 2018-01-31 DIAGNOSIS — H05.352 EXOSTOSIS OF LEFT ORBIT: Chronic | ICD-10-CM

## 2018-01-31 LAB
ANION GAP SERPL CALC-SCNC: 15 MMOL/L — SIGNIFICANT CHANGE UP (ref 5–17)
BUN SERPL-MCNC: 10 MG/DL — SIGNIFICANT CHANGE UP (ref 7–23)
CALCIUM SERPL-MCNC: 9.4 MG/DL — SIGNIFICANT CHANGE UP (ref 8.4–10.5)
CHLORIDE SERPL-SCNC: 103 MMOL/L — SIGNIFICANT CHANGE UP (ref 96–108)
CO2 SERPL-SCNC: 22 MMOL/L — SIGNIFICANT CHANGE UP (ref 22–31)
CREAT SERPL-MCNC: 0.67 MG/DL — SIGNIFICANT CHANGE UP (ref 0.5–1.3)
GLUCOSE SERPL-MCNC: 148 MG/DL — HIGH (ref 70–99)
HBA1C BLD-MCNC: 7.8 % — HIGH (ref 4–5.6)
HCT VFR BLD CALC: 40.9 % — SIGNIFICANT CHANGE UP (ref 34.5–45)
HGB BLD-MCNC: 12.6 G/DL — SIGNIFICANT CHANGE UP (ref 11.5–15.5)
MCHC RBC-ENTMCNC: 23.3 PG — LOW (ref 27–34)
MCHC RBC-ENTMCNC: 30.8 GM/DL — LOW (ref 32–36)
MCV RBC AUTO: 75.7 FL — LOW (ref 80–100)
PLATELET # BLD AUTO: 273 K/UL — SIGNIFICANT CHANGE UP (ref 150–400)
POTASSIUM SERPL-MCNC: 4.8 MMOL/L — SIGNIFICANT CHANGE UP (ref 3.5–5.3)
POTASSIUM SERPL-SCNC: 4.8 MMOL/L — SIGNIFICANT CHANGE UP (ref 3.5–5.3)
RBC # BLD: 5.4 M/UL — HIGH (ref 3.8–5.2)
RBC # FLD: 17.6 % — HIGH (ref 10.3–14.5)
SODIUM SERPL-SCNC: 140 MMOL/L — SIGNIFICANT CHANGE UP (ref 135–145)
WBC # BLD: 8.3 K/UL — SIGNIFICANT CHANGE UP (ref 3.8–10.5)
WBC # FLD AUTO: 8.3 K/UL — SIGNIFICANT CHANGE UP (ref 3.8–10.5)

## 2018-01-31 PROCEDURE — 83036 HEMOGLOBIN GLYCOSYLATED A1C: CPT

## 2018-01-31 PROCEDURE — 85027 COMPLETE CBC AUTOMATED: CPT

## 2018-01-31 PROCEDURE — 80048 BASIC METABOLIC PNL TOTAL CA: CPT

## 2018-01-31 PROCEDURE — G0463: CPT

## 2018-01-31 RX ORDER — CHOLECALCIFEROL (VITAMIN D3) 125 MCG
1 CAPSULE ORAL
Qty: 0 | Refills: 0 | COMMUNITY

## 2018-01-31 RX ORDER — ACETYLCYSTEINE 200 MG/ML
0 VIAL (ML) MISCELLANEOUS
Qty: 0 | Refills: 0 | COMMUNITY

## 2018-01-31 RX ORDER — LIDOCAINE HCL 20 MG/ML
0.2 VIAL (ML) INJECTION ONCE
Qty: 0 | Refills: 0 | Status: DISCONTINUED | OUTPATIENT
Start: 2018-02-09 | End: 2018-02-24

## 2018-01-31 RX ORDER — ASCORBIC ACID 60 MG
1 TABLET,CHEWABLE ORAL
Qty: 0 | Refills: 0 | COMMUNITY

## 2018-01-31 RX ORDER — SODIUM CHLORIDE 9 MG/ML
3 INJECTION INTRAMUSCULAR; INTRAVENOUS; SUBCUTANEOUS EVERY 8 HOURS
Qty: 0 | Refills: 0 | Status: DISCONTINUED | OUTPATIENT
Start: 2018-02-09 | End: 2018-02-24

## 2018-01-31 NOTE — H&P PST ADULT - PMH
Adrenal insufficiency    Aortic disease  leaky valve  Aortic insufficiency  mod/severe AI on echo 9/25/16  Asthma    Atrial fibrillation    Colorectal cancer  7/2017- last treatment , chemo and radiation  COPD (chronic obstructive pulmonary disease)    Diabetes  type two. insulin dependent  Pelvic fracture    Tracheobronchomalacia Adrenal insufficiency    Aortic disease  leaky valve  Aortic insufficiency  mod/severe AI on echo 9/25/17  Asthma    Atrial fibrillation    Colorectal cancer  7/2017- last treatment , chemo and radiation  COPD (chronic obstructive pulmonary disease)    Diabetes  type two. insulin dependent  Pelvic fracture    Rectal bleeding    Seizure  x 1 1/7/18  Tracheobronchomalacia

## 2018-01-31 NOTE — H&P PST ADULT - HISTORY OF PRESENT ILLNESS
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 The patient states that for the past 3-4 weeks she has had a cough with productive green sputum, and was following up with her Pulmonologist Dr. Allison as an outpatient who provided her with Bactrim, which she has taken 4 days total of. She states that yesterday she was sitting at home in the evening and suddenly felt SOB which was non responsive to albuterol along with epigastric discomfort which was relieved by pressing on the area. The patient states that she had a similar episode this morning, which prompted her to come in. She denies any F NVD, palpitations, CP, dysuria, rhinorrhea, MARY, or weight change. States she has notices her stools are darker. Had eaten prior to these epigastric episodes.    Vital Signs Last 24 Hrs  T(C): 36.6 (21 Dec 2017 23:23), Max: 37.1 (21 Dec 2017 13:18)  T(F): 97.9 (21 Dec 2017 23:23), Max: 98.7 (21 Dec 2017 13:18)  HR: 80 (21 Dec 2017 23:23) (68 - 88)  BP: 162/83 (21 Dec 2017 23:23) (125/62 - 176/66)  BP(mean): --  RR: 16 (21 Dec 2017 23:23) (16 - 18)  SpO2: 97% (21 Dec 2017 23:23) (96% - 100%)    In the ED the patient was given Solumedrol, Duonebs, with mild improvement. CT Chest, DVT study performed. 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 Seen and evaluated by Dr Luong and now presents pre rectal exam under anesthesia.

## 2018-02-05 ENCOUNTER — APPOINTMENT (OUTPATIENT)
Dept: HEART AND VASCULAR | Facility: CLINIC | Age: 68
End: 2018-02-05

## 2018-02-07 ENCOUNTER — APPOINTMENT (OUTPATIENT)
Dept: PULMONOLOGY | Facility: CLINIC | Age: 68
End: 2018-02-07
Payer: MEDICARE

## 2018-02-07 VITALS
RESPIRATION RATE: 17 BRPM | HEART RATE: 89 BPM | WEIGHT: 160 LBS | OXYGEN SATURATION: 94 % | BODY MASS INDEX: 25.11 KG/M2 | HEIGHT: 67 IN | DIASTOLIC BLOOD PRESSURE: 60 MMHG | TEMPERATURE: 98.4 F | SYSTOLIC BLOOD PRESSURE: 112 MMHG

## 2018-02-07 PROCEDURE — 99214 OFFICE O/P EST MOD 30 MIN: CPT | Mod: 25

## 2018-02-07 PROCEDURE — 94729 DIFFUSING CAPACITY: CPT

## 2018-02-07 PROCEDURE — 94618 PULMONARY STRESS TESTING: CPT

## 2018-02-07 PROCEDURE — 96372 THER/PROPH/DIAG INJ SC/IM: CPT | Mod: 59

## 2018-02-07 PROCEDURE — 94060 EVALUATION OF WHEEZING: CPT | Mod: 59

## 2018-02-07 PROCEDURE — 94727 GAS DIL/WSHOT DETER LNG VOL: CPT

## 2018-02-07 RX ORDER — OMALIZUMAB 202.5 MG/1.4ML
150 INJECTION, SOLUTION SUBCUTANEOUS
Qty: 45 | Refills: 0 | Status: COMPLETED | OUTPATIENT
Start: 2018-02-07

## 2018-02-07 RX ADMIN — OMALIZUMAB 0 MG: 202.5 INJECTION, SOLUTION SUBCUTANEOUS at 00:00

## 2018-02-08 RX ORDER — ACETAMINOPHEN 500 MG
1000 TABLET ORAL ONCE
Qty: 0 | Refills: 0 | Status: DISCONTINUED | OUTPATIENT
Start: 2018-02-09 | End: 2018-02-24

## 2018-02-08 RX ORDER — ONDANSETRON 8 MG/1
4 TABLET, FILM COATED ORAL ONCE
Qty: 0 | Refills: 0 | Status: DISCONTINUED | OUTPATIENT
Start: 2018-02-09 | End: 2018-02-24

## 2018-02-09 ENCOUNTER — OUTPATIENT (OUTPATIENT)
Dept: OUTPATIENT SERVICES | Facility: HOSPITAL | Age: 68
LOS: 1 days | End: 2018-02-09
Payer: MEDICARE

## 2018-02-09 ENCOUNTER — RESULT REVIEW (OUTPATIENT)
Age: 68
End: 2018-02-09

## 2018-02-09 ENCOUNTER — APPOINTMENT (OUTPATIENT)
Dept: COLORECTAL SURGERY | Facility: HOSPITAL | Age: 68
End: 2018-02-09
Payer: MEDICARE

## 2018-02-09 ENCOUNTER — TRANSCRIPTION ENCOUNTER (OUTPATIENT)
Age: 68
End: 2018-02-09

## 2018-02-09 VITALS
TEMPERATURE: 99 F | OXYGEN SATURATION: 96 % | SYSTOLIC BLOOD PRESSURE: 111 MMHG | WEIGHT: 160.06 LBS | DIASTOLIC BLOOD PRESSURE: 66 MMHG | HEIGHT: 67.5 IN | HEART RATE: 79 BPM | RESPIRATION RATE: 18 BRPM

## 2018-02-09 VITALS
SYSTOLIC BLOOD PRESSURE: 127 MMHG | OXYGEN SATURATION: 98 % | RESPIRATION RATE: 16 BRPM | HEART RATE: 70 BPM | DIASTOLIC BLOOD PRESSURE: 55 MMHG

## 2018-02-09 DIAGNOSIS — H05.352 EXOSTOSIS OF LEFT ORBIT: Chronic | ICD-10-CM

## 2018-02-09 DIAGNOSIS — Z98.89 OTHER SPECIFIED POSTPROCEDURAL STATES: Chronic | ICD-10-CM

## 2018-02-09 DIAGNOSIS — Z96.653 PRESENCE OF ARTIFICIAL KNEE JOINT, BILATERAL: Chronic | ICD-10-CM

## 2018-02-09 DIAGNOSIS — K61.1 RECTAL ABSCESS: ICD-10-CM

## 2018-02-09 PROCEDURE — 88341 IMHCHEM/IMCYTCHM EA ADD ANTB: CPT | Mod: 26

## 2018-02-09 PROCEDURE — 88342 IMHCHEM/IMCYTCHM 1ST ANTB: CPT | Mod: 26

## 2018-02-09 PROCEDURE — 46999 UNLISTED PROCEDURE ANUS: CPT

## 2018-02-09 PROCEDURE — 88342 IMHCHEM/IMCYTCHM 1ST ANTB: CPT

## 2018-02-09 PROCEDURE — 88305 TISSUE EXAM BY PATHOLOGIST: CPT | Mod: 26

## 2018-02-09 PROCEDURE — 88341 IMHCHEM/IMCYTCHM EA ADD ANTB: CPT

## 2018-02-09 PROCEDURE — 88377 M/PHMTRC ALYS ISHQUANT/SEMIQ: CPT

## 2018-02-09 PROCEDURE — 11100 BX SKIN SUBCUTANEOUS&/MUCOUS MEMBRANE 1 LESION: CPT

## 2018-02-09 PROCEDURE — 88305 TISSUE EXAM BY PATHOLOGIST: CPT

## 2018-02-09 PROCEDURE — 88365 INSITU HYBRIDIZATION (FISH): CPT | Mod: 26

## 2018-02-09 PROCEDURE — 88365 INSITU HYBRIDIZATION (FISH): CPT

## 2018-02-09 NOTE — ASU PATIENT PROFILE, ADULT - PMH
Adrenal insufficiency    Aortic disease  leaky valve  Aortic insufficiency  mod/severe AI on echo 9/25/17  Asthma    Atrial fibrillation    Colorectal cancer  7/2017- last treatment , chemo and radiation  COPD (chronic obstructive pulmonary disease)    Diabetes  type two. insulin dependent  Pelvic fracture    Rectal bleeding    Seizure  x 1 1/7/18  Tracheobronchomalacia

## 2018-02-09 NOTE — ASU DISCHARGE PLAN (ADULT/PEDIATRIC). - MEDICATION SUMMARY - MEDICATIONS TO TAKE
I will START or STAY ON the medications listed below when I get home from the hospital:    methylPREDNISolone 4 mg oral tablet  -- 1 tab(s) by mouth once a day  -- Indication: For home med    Diovan 40 mg oral tablet  -- 1 tab(s) by mouth once a day  -- Indication: For home med    digoxin 250 mcg (0.25 mg) oral tablet  -- 1 tab(s) by mouth once a day  -- Indication: For home med    Eliquis 5 mg oral tablet  -- 1 tab(s) by mouth 2 times a day  -- Indication: For home med    pregabalin 150 mg oral capsule  -- 1 cap(s) by mouth 2 times a day  -- Indication: For home med    Lantus Solostar Pen 100 units/mL subcutaneous solution  -- 10 to 22 unit(s) subcutaneous once a day (at bedtime)  -- Do not drink alcoholic beverages when taking this medication.  It is very important that you take or use this exactly as directed.  Do not skip doses or discontinue unless directed by your doctor.  Keep in refrigerator.  Do not freeze.    -- Indication: For home med    Victoza 18 mg/3 mL subcutaneous solution  -- 1.8 milligram(s) subcutaneous once a day (in the morning)  -- Indication: For home med    NovoLOG 100 units/mL subcutaneous solution  -- about 7 unit(s) subcutaneous 3 times a day  -- Indication: For home med    Xyzal 5 mg oral tablet  -- 1 tab(s) by mouth once a day (in the evening), As Needed  -- Indication: For home med    Spiriva 18 mcg inhalation capsule  -- 1 cap(s) inhaled once a day  -- Indication: For home med    albuterol CFC free 90 mcg/inh inhalation aerosol  -- 1 puff(s) inhaled every 4 hours, As Needed - for shortness of breath and/or wheezing  -- Indication: For home med    albuterol 2.5 mg/3 mL (0.083%) inhalation solution  -- 3 milliliter(s) inhaled 2 times a day  -- Indication: For home med    Breo Ellipta 200 mcg-25 mcg/inh inhalation powder  -- 1 puff(s) inhaled once a day  -- Indication: For home med    Xolair 150 mg subcutaneous injection  -- subcutaneous every 2 weeks  -- Indication: For home med    montelukast 10 mg oral tablet  -- 1 tab(s) by mouth once a day  -- Indication: For home med    pantoprazole 40 mg oral delayed release tablet  -- 1 tab(s) by mouth once a day (before a meal)  -- Indication: For home med

## 2018-02-09 NOTE — ASU DISCHARGE PLAN (ADULT/PEDIATRIC). - ASU FOLLOWUP
[facility] ([facility]): [facility] ([facility]):

## 2018-02-11 ENCOUNTER — FORM ENCOUNTER (OUTPATIENT)
Age: 68
End: 2018-02-11

## 2018-02-12 ENCOUNTER — OUTPATIENT (OUTPATIENT)
Dept: OUTPATIENT SERVICES | Facility: HOSPITAL | Age: 68
LOS: 1 days | End: 2018-02-12
Payer: MEDICARE

## 2018-02-12 ENCOUNTER — APPOINTMENT (OUTPATIENT)
Dept: ULTRASOUND IMAGING | Facility: IMAGING CENTER | Age: 68
End: 2018-02-12
Payer: MEDICARE

## 2018-02-12 ENCOUNTER — APPOINTMENT (OUTPATIENT)
Dept: MAMMOGRAPHY | Facility: IMAGING CENTER | Age: 68
End: 2018-02-12
Payer: MEDICARE

## 2018-02-12 DIAGNOSIS — Z98.89 OTHER SPECIFIED POSTPROCEDURAL STATES: Chronic | ICD-10-CM

## 2018-02-12 DIAGNOSIS — H05.352 EXOSTOSIS OF LEFT ORBIT: Chronic | ICD-10-CM

## 2018-02-12 DIAGNOSIS — N63.10 UNSPECIFIED LUMP IN THE RIGHT BREAST, UNSPECIFIED QUADRANT: ICD-10-CM

## 2018-02-12 DIAGNOSIS — Z96.653 PRESENCE OF ARTIFICIAL KNEE JOINT, BILATERAL: Chronic | ICD-10-CM

## 2018-02-12 PROCEDURE — 76642 ULTRASOUND BREAST LIMITED: CPT | Mod: 26,50

## 2018-02-12 PROCEDURE — 77066 DX MAMMO INCL CAD BI: CPT

## 2018-02-12 PROCEDURE — 76642 ULTRASOUND BREAST LIMITED: CPT

## 2018-02-12 PROCEDURE — G0279: CPT | Mod: 26

## 2018-02-12 PROCEDURE — 77066 DX MAMMO INCL CAD BI: CPT | Mod: 26

## 2018-02-12 PROCEDURE — 77065 DX MAMMO INCL CAD UNI: CPT

## 2018-02-12 PROCEDURE — G0279: CPT

## 2018-02-16 ENCOUNTER — OUTPATIENT (OUTPATIENT)
Dept: OUTPATIENT SERVICES | Facility: HOSPITAL | Age: 68
LOS: 1 days | End: 2018-02-16
Payer: MEDICARE

## 2018-02-16 DIAGNOSIS — Z98.89 OTHER SPECIFIED POSTPROCEDURAL STATES: Chronic | ICD-10-CM

## 2018-02-16 DIAGNOSIS — Z96.653 PRESENCE OF ARTIFICIAL KNEE JOINT, BILATERAL: Chronic | ICD-10-CM

## 2018-02-16 DIAGNOSIS — C21.0 MALIGNANT NEOPLASM OF ANUS, UNSPECIFIED: ICD-10-CM

## 2018-02-16 DIAGNOSIS — Z86.010 PERSONAL HISTORY OF COLONIC POLYPS: ICD-10-CM

## 2018-02-16 DIAGNOSIS — H05.352 EXOSTOSIS OF LEFT ORBIT: Chronic | ICD-10-CM

## 2018-02-16 LAB
GLUCOSE BLDC GLUCOMTR-MCNC: 137 MG/DL — HIGH (ref 70–99)
SURGICAL PATHOLOGY STUDY: SIGNIFICANT CHANGE UP

## 2018-02-16 PROCEDURE — 82962 GLUCOSE BLOOD TEST: CPT

## 2018-02-16 PROCEDURE — G0105: CPT

## 2018-02-21 ENCOUNTER — OUTPATIENT (OUTPATIENT)
Dept: OUTPATIENT SERVICES | Facility: HOSPITAL | Age: 68
LOS: 1 days | Discharge: HOME | End: 2018-02-21

## 2018-02-21 ENCOUNTER — APPOINTMENT (OUTPATIENT)
Dept: PULMONOLOGY | Facility: CLINIC | Age: 68
End: 2018-02-21

## 2018-02-21 DIAGNOSIS — Z96.653 PRESENCE OF ARTIFICIAL KNEE JOINT, BILATERAL: Chronic | ICD-10-CM

## 2018-02-21 DIAGNOSIS — Z98.89 OTHER SPECIFIED POSTPROCEDURAL STATES: Chronic | ICD-10-CM

## 2018-02-21 DIAGNOSIS — H05.352 EXOSTOSIS OF LEFT ORBIT: Chronic | ICD-10-CM

## 2018-02-21 DIAGNOSIS — C18.9 MALIGNANT NEOPLASM OF COLON, UNSPECIFIED: ICD-10-CM

## 2018-02-23 ENCOUNTER — APPOINTMENT (OUTPATIENT)
Dept: PULMONOLOGY | Facility: CLINIC | Age: 68
End: 2018-02-23
Payer: MEDICARE

## 2018-02-23 PROCEDURE — 96372 THER/PROPH/DIAG INJ SC/IM: CPT

## 2018-02-23 RX ORDER — OMALIZUMAB 202.5 MG/1.4ML
150 INJECTION, SOLUTION SUBCUTANEOUS
Qty: 45 | Refills: 0 | Status: COMPLETED | OUTPATIENT
Start: 2018-02-23

## 2018-02-23 RX ADMIN — OMALIZUMAB 0 MG: 202.5 INJECTION, SOLUTION SUBCUTANEOUS at 00:00

## 2018-03-01 ENCOUNTER — APPOINTMENT (OUTPATIENT)
Dept: HEMATOLOGY ONCOLOGY | Facility: CLINIC | Age: 68
End: 2018-03-01
Payer: MEDICARE

## 2018-03-01 VITALS
HEART RATE: 99 BPM | WEIGHT: 158.73 LBS | SYSTOLIC BLOOD PRESSURE: 120 MMHG | RESPIRATION RATE: 18 BRPM | DIASTOLIC BLOOD PRESSURE: 72 MMHG | TEMPERATURE: 98.7 F | BODY MASS INDEX: 24.86 KG/M2 | OXYGEN SATURATION: 94 %

## 2018-03-01 PROCEDURE — 99215 OFFICE O/P EST HI 40 MIN: CPT

## 2018-03-01 RX ORDER — LEVOCETIRIZINE DIHYDROCHLORIDE 5 MG/1
5 TABLET ORAL
Qty: 1 | Refills: 1 | Status: DISCONTINUED | COMMUNITY
Start: 2017-05-13 | End: 2018-03-01

## 2018-03-01 RX ORDER — NYSTATIN 100000 [USP'U]/ML
100000 SUSPENSION ORAL
Qty: 1 | Refills: 2 | Status: DISCONTINUED | COMMUNITY
Start: 2018-01-10 | End: 2018-03-01

## 2018-03-01 RX ORDER — ACETYLCYSTEINE 200 MG/ML
20 SOLUTION ORAL; RESPIRATORY (INHALATION) 3 TIMES DAILY
Qty: 3 | Refills: 1 | Status: DISCONTINUED | COMMUNITY
Start: 2017-10-26 | End: 2018-03-01

## 2018-03-01 RX ORDER — SULFAMETHOXAZOLE AND TRIMETHOPRIM 800; 160 MG/1; MG/1
800-160 TABLET ORAL
Qty: 20 | Refills: 0 | Status: DISCONTINUED | COMMUNITY
Start: 2018-02-06 | End: 2018-03-01

## 2018-03-01 RX ORDER — FORMOTEROL FUMARATE DIHYDRATE 20 UG/2ML
20 SOLUTION RESPIRATORY (INHALATION)
Qty: 60 | Refills: 3 | Status: DISCONTINUED | COMMUNITY
Start: 2017-08-09 | End: 2018-03-01

## 2018-03-01 RX ORDER — FLUTICASONE FUROATE AND VILANTEROL TRIFENATATE 200; 25 UG/1; UG/1
200-25 POWDER RESPIRATORY (INHALATION) DAILY
Qty: 3 | Refills: 1 | Status: DISCONTINUED | COMMUNITY
Start: 2017-07-06 | End: 2018-03-01

## 2018-03-02 ENCOUNTER — FORM ENCOUNTER (OUTPATIENT)
Age: 68
End: 2018-03-02

## 2018-03-02 ENCOUNTER — APPOINTMENT (OUTPATIENT)
Dept: COLORECTAL SURGERY | Facility: CLINIC | Age: 68
End: 2018-03-02

## 2018-03-03 ENCOUNTER — APPOINTMENT (OUTPATIENT)
Dept: MRI IMAGING | Facility: IMAGING CENTER | Age: 68
End: 2018-03-03
Payer: MEDICARE

## 2018-03-03 ENCOUNTER — OUTPATIENT (OUTPATIENT)
Dept: OUTPATIENT SERVICES | Facility: HOSPITAL | Age: 68
LOS: 1 days | End: 2018-03-03
Payer: MEDICARE

## 2018-03-03 DIAGNOSIS — C21.1 MALIGNANT NEOPLASM OF ANAL CANAL: ICD-10-CM

## 2018-03-03 DIAGNOSIS — Z98.89 OTHER SPECIFIED POSTPROCEDURAL STATES: Chronic | ICD-10-CM

## 2018-03-03 DIAGNOSIS — H05.352 EXOSTOSIS OF LEFT ORBIT: Chronic | ICD-10-CM

## 2018-03-03 DIAGNOSIS — Z96.653 PRESENCE OF ARTIFICIAL KNEE JOINT, BILATERAL: Chronic | ICD-10-CM

## 2018-03-03 PROCEDURE — 72197 MRI PELVIS W/O & W/DYE: CPT

## 2018-03-03 PROCEDURE — 72197 MRI PELVIS W/O & W/DYE: CPT | Mod: 26

## 2018-03-03 PROCEDURE — 82565 ASSAY OF CREATININE: CPT

## 2018-03-03 PROCEDURE — A9585: CPT

## 2018-03-05 ENCOUNTER — APPOINTMENT (OUTPATIENT)
Dept: RADIATION ONCOLOGY | Facility: CLINIC | Age: 68
End: 2018-03-05
Payer: MEDICARE

## 2018-03-05 VITALS — DIASTOLIC BLOOD PRESSURE: 73 MMHG | SYSTOLIC BLOOD PRESSURE: 151 MMHG

## 2018-03-05 VITALS
DIASTOLIC BLOOD PRESSURE: 81 MMHG | HEART RATE: 89 BPM | SYSTOLIC BLOOD PRESSURE: 161 MMHG | TEMPERATURE: 97.9 F | BODY MASS INDEX: 24.8 KG/M2 | OXYGEN SATURATION: 97 % | RESPIRATION RATE: 18 BRPM | WEIGHT: 158 LBS | HEIGHT: 67 IN

## 2018-03-05 PROCEDURE — 99215 OFFICE O/P EST HI 40 MIN: CPT | Mod: 25

## 2018-03-06 ENCOUNTER — APPOINTMENT (OUTPATIENT)
Dept: NEUROLOGY | Facility: CLINIC | Age: 68
End: 2018-03-06

## 2018-03-08 ENCOUNTER — APPOINTMENT (OUTPATIENT)
Dept: PULMONOLOGY | Facility: CLINIC | Age: 68
End: 2018-03-08
Payer: MEDICARE

## 2018-03-08 ENCOUNTER — APPOINTMENT (OUTPATIENT)
Dept: INFUSION THERAPY | Facility: HOSPITAL | Age: 68
End: 2018-03-08

## 2018-03-08 VITALS
WEIGHT: 158 LBS | BODY MASS INDEX: 24.8 KG/M2 | HEIGHT: 67 IN | DIASTOLIC BLOOD PRESSURE: 60 MMHG | OXYGEN SATURATION: 95 % | SYSTOLIC BLOOD PRESSURE: 120 MMHG | HEART RATE: 85 BPM

## 2018-03-08 PROCEDURE — 96372 THER/PROPH/DIAG INJ SC/IM: CPT

## 2018-03-08 RX ORDER — OMALIZUMAB 202.5 MG/1.4ML
150 INJECTION, SOLUTION SUBCUTANEOUS
Qty: 45 | Refills: 0 | Status: COMPLETED | OUTPATIENT
Start: 2018-03-08

## 2018-03-08 RX ADMIN — OMALIZUMAB 0 MG: 202.5 INJECTION, SOLUTION SUBCUTANEOUS at 00:00

## 2018-03-12 ENCOUNTER — APPOINTMENT (OUTPATIENT)
Dept: CARDIOTHORACIC SURGERY | Facility: CLINIC | Age: 68
End: 2018-03-12
Payer: MEDICARE

## 2018-03-12 ENCOUNTER — OTHER (OUTPATIENT)
Age: 68
End: 2018-03-12

## 2018-03-12 ENCOUNTER — OUTPATIENT (OUTPATIENT)
Dept: OUTPATIENT SERVICES | Facility: HOSPITAL | Age: 68
LOS: 1 days | End: 2018-03-12
Payer: MEDICARE

## 2018-03-12 ENCOUNTER — APPOINTMENT (OUTPATIENT)
Dept: CV DIAGNOSITCS | Facility: HOSPITAL | Age: 68
End: 2018-03-12

## 2018-03-12 ENCOUNTER — NON-APPOINTMENT (OUTPATIENT)
Age: 68
End: 2018-03-12

## 2018-03-12 VITALS
DIASTOLIC BLOOD PRESSURE: 72 MMHG | OXYGEN SATURATION: 95 % | HEIGHT: 67 IN | WEIGHT: 158 LBS | TEMPERATURE: 98 F | HEART RATE: 75 BPM | RESPIRATION RATE: 15 BRPM | SYSTOLIC BLOOD PRESSURE: 118 MMHG | BODY MASS INDEX: 24.8 KG/M2

## 2018-03-12 DIAGNOSIS — H05.352 EXOSTOSIS OF LEFT ORBIT: Chronic | ICD-10-CM

## 2018-03-12 DIAGNOSIS — Z96.653 PRESENCE OF ARTIFICIAL KNEE JOINT, BILATERAL: Chronic | ICD-10-CM

## 2018-03-12 DIAGNOSIS — Z98.89 OTHER SPECIFIED POSTPROCEDURAL STATES: Chronic | ICD-10-CM

## 2018-03-12 DIAGNOSIS — I35.1 NONRHEUMATIC AORTIC (VALVE) INSUFFICIENCY: ICD-10-CM

## 2018-03-12 PROCEDURE — 99215 OFFICE O/P EST HI 40 MIN: CPT

## 2018-03-12 PROCEDURE — 93000 ELECTROCARDIOGRAM COMPLETE: CPT

## 2018-03-12 PROCEDURE — 93306 TTE W/DOPPLER COMPLETE: CPT | Mod: 26

## 2018-03-12 PROCEDURE — 0399T: CPT

## 2018-03-12 PROCEDURE — 93306 TTE W/DOPPLER COMPLETE: CPT

## 2018-03-13 ENCOUNTER — RX RENEWAL (OUTPATIENT)
Age: 68
End: 2018-03-13

## 2018-03-14 ENCOUNTER — APPOINTMENT (OUTPATIENT)
Dept: RADIATION ONCOLOGY | Facility: CLINIC | Age: 68
End: 2018-03-14

## 2018-03-15 ENCOUNTER — APPOINTMENT (OUTPATIENT)
Dept: THORACIC SURGERY | Facility: CLINIC | Age: 68
End: 2018-03-15
Payer: MEDICARE

## 2018-03-15 VITALS
OXYGEN SATURATION: 93 % | RESPIRATION RATE: 19 BRPM | HEART RATE: 94 BPM | DIASTOLIC BLOOD PRESSURE: 67 MMHG | SYSTOLIC BLOOD PRESSURE: 143 MMHG | BODY MASS INDEX: 25.06 KG/M2 | TEMPERATURE: 99.9 F | WEIGHT: 160 LBS

## 2018-03-15 DIAGNOSIS — Z87.898 PERSONAL HISTORY OF OTHER SPECIFIED CONDITIONS: ICD-10-CM

## 2018-03-15 DIAGNOSIS — R56.9 UNSPECIFIED CONVULSIONS: ICD-10-CM

## 2018-03-15 DIAGNOSIS — R05 COUGH: ICD-10-CM

## 2018-03-15 DIAGNOSIS — Z86.79 PERSONAL HISTORY OF OTHER DISEASES OF THE CIRCULATORY SYSTEM: ICD-10-CM

## 2018-03-15 DIAGNOSIS — Z01.818 ENCOUNTER FOR OTHER PREPROCEDURAL EXAMINATION: ICD-10-CM

## 2018-03-15 DIAGNOSIS — Z86.2 PERSONAL HISTORY OF DISEASES OF THE BLOOD AND BLOOD-FORMING ORGANS AND CERTAIN DISORDERS INVOLVING THE IMMUNE MECHANISM: ICD-10-CM

## 2018-03-15 DIAGNOSIS — F99 MENTAL DISORDER, NOT OTHERWISE SPECIFIED: ICD-10-CM

## 2018-03-15 PROCEDURE — 99215 OFFICE O/P EST HI 40 MIN: CPT

## 2018-03-16 ENCOUNTER — APPOINTMENT (OUTPATIENT)
Dept: RADIATION ONCOLOGY | Facility: CLINIC | Age: 68
End: 2018-03-16

## 2018-03-23 ENCOUNTER — APPOINTMENT (OUTPATIENT)
Dept: PULMONOLOGY | Facility: CLINIC | Age: 68
End: 2018-03-23
Payer: MEDICARE

## 2018-03-23 ENCOUNTER — RX RENEWAL (OUTPATIENT)
Age: 68
End: 2018-03-23

## 2018-03-23 VITALS
OXYGEN SATURATION: 93 % | SYSTOLIC BLOOD PRESSURE: 116 MMHG | HEIGHT: 67 IN | HEART RATE: 76 BPM | BODY MASS INDEX: 25.11 KG/M2 | WEIGHT: 160 LBS | DIASTOLIC BLOOD PRESSURE: 80 MMHG

## 2018-03-23 PROCEDURE — 96372 THER/PROPH/DIAG INJ SC/IM: CPT

## 2018-03-23 RX ORDER — OMALIZUMAB 202.5 MG/1.4ML
150 INJECTION, SOLUTION SUBCUTANEOUS
Qty: 45 | Refills: 0 | Status: COMPLETED | OUTPATIENT
Start: 2018-03-23

## 2018-03-23 RX ADMIN — OMALIZUMAB 0 MG: 202.5 INJECTION, SOLUTION SUBCUTANEOUS at 00:00

## 2018-03-28 ENCOUNTER — APPOINTMENT (OUTPATIENT)
Dept: NEUROLOGY | Facility: CLINIC | Age: 68
End: 2018-03-28

## 2018-03-29 ENCOUNTER — EMERGENCY (EMERGENCY)
Facility: HOSPITAL | Age: 68
LOS: 1 days | Discharge: HOME | End: 2018-03-29
Attending: EMERGENCY MEDICINE
Payer: MEDICARE

## 2018-03-29 VITALS
OXYGEN SATURATION: 95 % | HEART RATE: 80 BPM | RESPIRATION RATE: 18 BRPM | TEMPERATURE: 98 F | DIASTOLIC BLOOD PRESSURE: 65 MMHG | SYSTOLIC BLOOD PRESSURE: 136 MMHG

## 2018-03-29 VITALS
DIASTOLIC BLOOD PRESSURE: 55 MMHG | RESPIRATION RATE: 16 BRPM | OXYGEN SATURATION: 96 % | TEMPERATURE: 98 F | SYSTOLIC BLOOD PRESSURE: 153 MMHG | HEART RATE: 65 BPM

## 2018-03-29 VITALS — HEART RATE: 82 BPM | SYSTOLIC BLOOD PRESSURE: 147 MMHG | OXYGEN SATURATION: 93 % | DIASTOLIC BLOOD PRESSURE: 84 MMHG

## 2018-03-29 VITALS
OXYGEN SATURATION: 91 % | SYSTOLIC BLOOD PRESSURE: 141 MMHG | RESPIRATION RATE: 18 BRPM | HEART RATE: 80 BPM | DIASTOLIC BLOOD PRESSURE: 82 MMHG

## 2018-03-29 DIAGNOSIS — Z98.89 OTHER SPECIFIED POSTPROCEDURAL STATES: Chronic | ICD-10-CM

## 2018-03-29 DIAGNOSIS — H05.352 EXOSTOSIS OF LEFT ORBIT: Chronic | ICD-10-CM

## 2018-03-29 DIAGNOSIS — Z96.653 PRESENCE OF ARTIFICIAL KNEE JOINT, BILATERAL: Chronic | ICD-10-CM

## 2018-03-29 LAB
ALBUMIN SERPL ELPH-MCNC: 4 G/DL — SIGNIFICANT CHANGE UP (ref 3.3–5)
ALP SERPL-CCNC: 118 U/L — SIGNIFICANT CHANGE UP (ref 40–120)
ALT FLD-CCNC: 11 U/L RC — SIGNIFICANT CHANGE UP (ref 10–45)
ANION GAP SERPL CALC-SCNC: 14 MMOL/L — SIGNIFICANT CHANGE UP (ref 5–17)
APTT BLD: 36.3 SEC — SIGNIFICANT CHANGE UP (ref 27.5–37.4)
AST SERPL-CCNC: 13 U/L — SIGNIFICANT CHANGE UP (ref 10–40)
BASOPHILS # BLD AUTO: 0 K/UL — SIGNIFICANT CHANGE UP (ref 0–0.2)
BASOPHILS NFR BLD AUTO: 0.1 % — SIGNIFICANT CHANGE UP (ref 0–2)
BILIRUB SERPL-MCNC: 0.2 MG/DL — SIGNIFICANT CHANGE UP (ref 0.2–1.2)
BUN SERPL-MCNC: 12 MG/DL — SIGNIFICANT CHANGE UP (ref 7–23)
CALCIUM SERPL-MCNC: 9.5 MG/DL — SIGNIFICANT CHANGE UP (ref 8.4–10.5)
CHLORIDE SERPL-SCNC: 100 MMOL/L — SIGNIFICANT CHANGE UP (ref 96–108)
CO2 SERPL-SCNC: 25 MMOL/L — SIGNIFICANT CHANGE UP (ref 22–31)
CREAT SERPL-MCNC: 0.72 MG/DL — SIGNIFICANT CHANGE UP (ref 0.5–1.3)
EOSINOPHIL # BLD AUTO: 0.1 K/UL — SIGNIFICANT CHANGE UP (ref 0–0.5)
EOSINOPHIL NFR BLD AUTO: 0.8 % — SIGNIFICANT CHANGE UP (ref 0–6)
GLUCOSE SERPL-MCNC: 137 MG/DL — HIGH (ref 70–99)
HCT VFR BLD CALC: 43.5 % — SIGNIFICANT CHANGE UP (ref 34.5–45)
HGB BLD-MCNC: 13.7 G/DL — SIGNIFICANT CHANGE UP (ref 11.5–15.5)
INR BLD: 1.18 RATIO — HIGH (ref 0.88–1.16)
LYMPHOCYTES # BLD AUTO: 1.1 K/UL — SIGNIFICANT CHANGE UP (ref 1–3.3)
LYMPHOCYTES # BLD AUTO: 9.8 % — LOW (ref 13–44)
MAGNESIUM SERPL-MCNC: 2.3 MG/DL — SIGNIFICANT CHANGE UP (ref 1.6–2.6)
MCHC RBC-ENTMCNC: 24.4 PG — LOW (ref 27–34)
MCHC RBC-ENTMCNC: 31.5 GM/DL — LOW (ref 32–36)
MCV RBC AUTO: 77.4 FL — LOW (ref 80–100)
MONOCYTES # BLD AUTO: 0.7 K/UL — SIGNIFICANT CHANGE UP (ref 0–0.9)
MONOCYTES NFR BLD AUTO: 6.2 % — SIGNIFICANT CHANGE UP (ref 2–14)
NEUTROPHILS # BLD AUTO: 9.3 K/UL — HIGH (ref 1.8–7.4)
NEUTROPHILS NFR BLD AUTO: 83.1 % — HIGH (ref 43–77)
PHOSPHATE SERPL-MCNC: 3.2 MG/DL — SIGNIFICANT CHANGE UP (ref 2.5–4.5)
PLATELET # BLD AUTO: 275 K/UL — SIGNIFICANT CHANGE UP (ref 150–400)
POTASSIUM SERPL-MCNC: 4.7 MMOL/L — SIGNIFICANT CHANGE UP (ref 3.5–5.3)
POTASSIUM SERPL-SCNC: 4.7 MMOL/L — SIGNIFICANT CHANGE UP (ref 3.5–5.3)
PROT SERPL-MCNC: 7.7 G/DL — SIGNIFICANT CHANGE UP (ref 6–8.3)
PROTHROM AB SERPL-ACNC: 12.9 SEC — HIGH (ref 9.8–12.7)
RBC # BLD: 5.61 M/UL — HIGH (ref 3.8–5.2)
RBC # FLD: 14.1 % — SIGNIFICANT CHANGE UP (ref 10.3–14.5)
SODIUM SERPL-SCNC: 139 MMOL/L — SIGNIFICANT CHANGE UP (ref 135–145)
WBC # BLD: 11.2 K/UL — HIGH (ref 3.8–10.5)
WBC # FLD AUTO: 11.2 K/UL — HIGH (ref 3.8–10.5)

## 2018-03-29 PROCEDURE — 85610 PROTHROMBIN TIME: CPT

## 2018-03-29 PROCEDURE — 71045 X-RAY EXAM CHEST 1 VIEW: CPT

## 2018-03-29 PROCEDURE — 71045 X-RAY EXAM CHEST 1 VIEW: CPT | Mod: 26

## 2018-03-29 PROCEDURE — 85027 COMPLETE CBC AUTOMATED: CPT

## 2018-03-29 PROCEDURE — 84100 ASSAY OF PHOSPHORUS: CPT

## 2018-03-29 PROCEDURE — 82550 ASSAY OF CK (CPK): CPT

## 2018-03-29 PROCEDURE — A9540: CPT

## 2018-03-29 PROCEDURE — 93010 ELECTROCARDIOGRAM REPORT: CPT

## 2018-03-29 PROCEDURE — 99284 EMERGENCY DEPT VISIT MOD MDM: CPT | Mod: 25

## 2018-03-29 PROCEDURE — A9567: CPT

## 2018-03-29 PROCEDURE — 78582 LUNG VENTILAT&PERFUS IMAGING: CPT

## 2018-03-29 PROCEDURE — 78582 LUNG VENTILAT&PERFUS IMAGING: CPT | Mod: 26

## 2018-03-29 PROCEDURE — 83735 ASSAY OF MAGNESIUM: CPT

## 2018-03-29 PROCEDURE — 85730 THROMBOPLASTIN TIME PARTIAL: CPT

## 2018-03-29 PROCEDURE — 93005 ELECTROCARDIOGRAM TRACING: CPT

## 2018-03-29 PROCEDURE — 82553 CREATINE MB FRACTION: CPT

## 2018-03-29 PROCEDURE — 84484 ASSAY OF TROPONIN QUANT: CPT

## 2018-03-29 PROCEDURE — 99285 EMERGENCY DEPT VISIT HI MDM: CPT | Mod: 25,GC

## 2018-03-29 PROCEDURE — 80053 COMPREHEN METABOLIC PANEL: CPT

## 2018-03-29 RX ORDER — ACETAMINOPHEN 500 MG
975 TABLET ORAL ONCE
Qty: 0 | Refills: 0 | Status: COMPLETED | OUTPATIENT
Start: 2018-03-29 | End: 2018-03-29

## 2018-03-29 RX ADMIN — Medication 975 MILLIGRAM(S): at 18:57

## 2018-03-29 NOTE — ED PROVIDER NOTE - NS ED ROS FT
CONSTITUTIONAL: tired, No weakness, fevers or chills  EYES/ENT: No visual changes;  No vertigo or throat pain   NECK: No pain or stiffness  RESPIRATORY: minimal cough and SOB, No wheezing or hemoptysis  CARDIOVASCULAR: L CP with radiation to back, No palpitations  GASTROINTESTINAL: No abdominal or epigastric pain. No nausea, vomiting, or hematemesis; No diarrhea or constipation. No melena or hematochezia.  GENITOURINARY: No dysuria, frequency or hematuria  NEUROLOGICAL: No numbness or weakness  SKIN: No itching, burning, rashes, or lesions   All other review of systems is negative unless indicated above.

## 2018-03-29 NOTE — ED PROVIDER NOTE - OBJECTIVE STATEMENT
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  Patient     PMD Martin Allison  Cards Steven Leahy  CTsx Zohaib Mcclellan  Onc Zach Boss Onc Allie Mart 67F with COPD, asthma (on Xolair), tracheobronchomalacia s/p tracheo-bronchoplasty (October 2016), Afib (on Eliquis), HTN, moderate to severe AR, adrenal insufficiency (on chronic medrol), DM2, HTN, remote hx of DVT and squamous cell CA of the anus s/p chemo (Xeloda) and RT who presents for L reproducible/pleuritic CP.  Patient awoke at ~4a needing to use the bathroom and noted that she was unable to roll out of bed to the left due to acute onset of sharp, stabbing 10/10 pain in her L chest with radiation to her back.  Patient went for reinitiation of RT today and when her symptoms were described to Rad Onc staff, they advised her to call her Cardiologist, Dr Leahy, who advised patient to come to the ED.  Patient has never experienced this before and reports only experiencing pain with deep breaths and movement of the L side/arm.  Endorses only mild tiredness and minimal (baseline) SOB.  Cough has been decreased of late.  Denies F/C, runny/stuffy nose, N/V/C/D, pedal edema, orthopnea, PND.  Baseline SpO2 94% on RA.    PMD Martin Leahy  CTsx Zohaib Mcclellan  Onc Zach King  Rad Onc Allie Mart 67F with COPD, asthma (on Xolair), tracheobronchomalacia s/p tracheo-bronchoplasty (October 2016), Afib (on Eliquis), HTN, moderate to severe AR, adrenal insufficiency (on chronic medrol), DM2, HTN, remote hx of DVT and squamous cell CA of the anus s/p chemo (Xeloda) and RT, recent hospitalization for COPD exacerbation (sputum cx MDR Acinetobacter) who presents for L reproducible/pleuritic CP.  Patient awoke at ~4a needing to use the bathroom and noted that she was unable to roll out of bed to the left due to acute onset of sharp, stabbing 10/10 pain in her L chest with radiation to her back.  Patient went for reinitiation of RT today and when her symptoms were described to Rad Onc staff, they advised her to call her Cardiologist, Dr Leahy, who advised patient to come to the ED.  Patient has never experienced this before and reports only experiencing pain with deep breaths and movement of the L side/arm.  Endorses only mild tiredness and minimal (baseline) SOB.  Cough has been decreased of late.  Denies F/C, runny/stuffy nose, N/V/C/D, pedal edema, orthopnea, PND.  Baseline SpO2 94% on RA.    PMD Martin Allison  Cards Steven Leahy  CTsx Zohaib Mcclellan  Rad Onc Allie Mart  Surg Terrell Luong

## 2018-03-29 NOTE — ED PROVIDER NOTE - MEDICAL DECISION MAKING DETAILS
67F with anal SCC, PAF on Eliquis, and severe asthma/COPD on Xolair who presents with sudden onset of L pleuritic/reproducible CP with radiation to back only exacerbated by movement.  Etiology likely MSK but due to high risk, possibly r/o ACS or PE.  EKG WNL.  Check CBC, CMP, Mg, Phos, coags, Jeffy, CXR.

## 2018-03-29 NOTE — ED ADULT NURSE NOTE - OBJECTIVE STATEMENT
68 y/o F pt with pmh of COPD, asthma (on Xolair), tracheobronchomalacia s/p tracheo-bronchoplasty (October 2016), Afib, HTN, moderate to severe AR, adrenal insufficiency, DM2, HTN, DVT and squamous cell CA of the anus s/p chemo (Xeloda) and RT, recent hospitalization for COPD exacerbation, present to ED for L reproducible/pleuritic CP.  Patient awoke at this morning at 4a needing to use the bathroom and noted that she was unable to roll out of bed to the left due to acute onset of sharp, stabbing 10/10 pain in her L chest with radiation to her back.  Patient went for RT today and when her symptoms were described to Rad Onc staff, they advised her to call her Cardiologist, Dr Leahy, who advised patient to come to the ED.  Patient has never experienced this before and reports only experiencing pain with deep breaths and movement of the L side/arm.  Endorses only mild tiredness and minimal (baseline) SOB.  Cough has been decreased of late.  here ED on exam lungs b/l wheezing and course, more to left, breathing spontaneous, unlabored without distress, sating 97% on room air, unable to take deep breath, pain worsen with breathing, denies SOB, fever, chills, runny nose, pedal edema, seen and eval by MD, ekg done and given to MD, placed on CM NSR, labs drawn and sent

## 2018-03-29 NOTE — ED PROVIDER NOTE - PLAN OF CARE
You came to the ED with chest pain that was present when moving and with deep inspiration.  To make sure you did not have a pulmonary embolus, a V/Q scan was performed and was low probability for acute PE.  As such, your pain is likely musculoskeletal in nature.  Please follow up with your PMD tomorrow for further evaluation and monitoring. You came to the ED with chest pain that was present when moving and with deep inspiration.  To make sure you did not have a pulmonary embolus, a V/Q scan was performed and was low probability for acute PE.  As such, your pain is likely musculoskeletal in nature.  Please follow up with your PMD tomorrow for further evaluation and monitoring and continue to take Tylenol as needed for your pain, not to exceed 4g per day.

## 2018-03-29 NOTE — ED PROVIDER NOTE - PROGRESS NOTE DETAILS
Vijay  PGY-3: V/Q scan low probability and patient requested provider to call Dr Allison.  Spoke with Dr Allison and he agrees that patient's symptoms likely 2/2 MSK and agreed with discharge.
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
## 2018-03-29 NOTE — ED PROVIDER NOTE - ATTENDING CONTRIBUTION TO CARE
67 yof pmhx copd asthma, tracheobronchomalacia s/p tracheobronchoplasty, a fib on eliquis, htn, adrenal insuff, dm, htn, remote hx of dv, scamous cell la of anus s/p chemo/ rt, presents w left pleuritic chest pain. states awoke this AM needing to get out of bed to use restroom and felt sudden onset left pleuritic chest pain w rads towards back. states went for RT today but was told to come to ER instead after describing he sxs. no central cp. states pain to left anterior chest w movements of chest and left arm. no recent heavy lifting. no significant sob w exertion, however staets when takes deep breath left chest hurts more. no f/c, no cough.     ros as above    Physical Exam:   constitutional - well appearing, awake and alert, oriented x3  head - no external evidence of trauma  cvs - rrr, no murmurs, no peripheral edema  resp - breath sounds clear and equal bilat. left anteiror chest wall tendeness. no rashes.   gi - abdomen soft and nontender, no rigidity, guarding or rebound, bowel sounds present  msk - moving all extremities spontaneously  neuro - alert and oriented x3, no focal deficits, CNs 2-12 grossly intact  skin- no jaundice, warm and dry  psych - mood and affect wnl, no apparent risk to self or others     most likely costochondritis however in setting of prior dvt, known ca currently undergoing therapy, and pleuritic nature of pain, and prior hx of thromboembolic dz, will eval for PE. pt w iodine allergy so will do vq scan. endorsed to Dr Martinez at 1700 pending further testing. JORGE Koch MD

## 2018-03-29 NOTE — ED PROVIDER NOTE - CARE PLAN
Principal Discharge DX:	Costochondral chest pain  Assessment and plan of treatment:	You came to the ED with chest pain that was present when moving and with deep inspiration.  To make sure you did not have a pulmonary embolus, a V/Q scan was performed and was low probability for acute PE.  As such, your pain is likely musculoskeletal in nature.  Please follow up with your PMD tomorrow for further evaluation and monitoring. Principal Discharge DX:	Costochondral chest pain  Assessment and plan of treatment:	You came to the ED with chest pain that was present when moving and with deep inspiration.  To make sure you did not have a pulmonary embolus, a V/Q scan was performed and was low probability for acute PE.  As such, your pain is likely musculoskeletal in nature.  Please follow up with your PMD tomorrow for further evaluation and monitoring and continue to take Tylenol as needed for your pain, not to exceed 4g per day.

## 2018-04-02 ENCOUNTER — OUTPATIENT (OUTPATIENT)
Dept: OUTPATIENT SERVICES | Facility: HOSPITAL | Age: 68
LOS: 1 days | Discharge: HOME | End: 2018-04-02
Payer: MEDICARE

## 2018-04-02 VITALS
HEART RATE: 76 BPM | TEMPERATURE: 98 F | OXYGEN SATURATION: 98 % | DIASTOLIC BLOOD PRESSURE: 78 MMHG | WEIGHT: 161.82 LBS | BODY MASS INDEX: 25.34 KG/M2 | SYSTOLIC BLOOD PRESSURE: 127 MMHG | RESPIRATION RATE: 15 BRPM

## 2018-04-02 DIAGNOSIS — Z96.653 PRESENCE OF ARTIFICIAL KNEE JOINT, BILATERAL: Chronic | ICD-10-CM

## 2018-04-02 DIAGNOSIS — Z98.89 OTHER SPECIFIED POSTPROCEDURAL STATES: Chronic | ICD-10-CM

## 2018-04-02 DIAGNOSIS — H05.352 EXOSTOSIS OF LEFT ORBIT: Chronic | ICD-10-CM

## 2018-04-02 PROCEDURE — G6002: CPT | Mod: 26

## 2018-04-02 PROCEDURE — 77427 RADIATION TX MANAGEMENT X5: CPT

## 2018-04-03 ENCOUNTER — MEDICATION RENEWAL (OUTPATIENT)
Age: 68
End: 2018-04-03

## 2018-04-03 PROCEDURE — G6002: CPT | Mod: 26

## 2018-04-04 PROCEDURE — 77387B: CUSTOM | Mod: 26

## 2018-04-05 PROCEDURE — 77427 RADIATION TX MANAGEMENT X5: CPT

## 2018-04-05 PROCEDURE — 77387B: CUSTOM | Mod: 26

## 2018-04-06 PROCEDURE — 77387B: CUSTOM | Mod: 26

## 2018-04-09 PROCEDURE — 77387B: CUSTOM | Mod: 26

## 2018-04-10 ENCOUNTER — APPOINTMENT (OUTPATIENT)
Dept: HEMATOLOGY ONCOLOGY | Facility: CLINIC | Age: 68
End: 2018-04-10

## 2018-04-10 ENCOUNTER — OUTPATIENT (OUTPATIENT)
Dept: OUTPATIENT SERVICES | Facility: HOSPITAL | Age: 68
LOS: 1 days | Discharge: HOME | End: 2018-04-10

## 2018-04-10 ENCOUNTER — RESULT REVIEW (OUTPATIENT)
Age: 68
End: 2018-04-10

## 2018-04-10 DIAGNOSIS — H05.352 EXOSTOSIS OF LEFT ORBIT: Chronic | ICD-10-CM

## 2018-04-10 DIAGNOSIS — Z98.89 OTHER SPECIFIED POSTPROCEDURAL STATES: Chronic | ICD-10-CM

## 2018-04-10 DIAGNOSIS — C18.9 MALIGNANT NEOPLASM OF COLON, UNSPECIFIED: ICD-10-CM

## 2018-04-10 DIAGNOSIS — Z96.653 PRESENCE OF ARTIFICIAL KNEE JOINT, BILATERAL: Chronic | ICD-10-CM

## 2018-04-10 LAB
BASOPHILS # BLD AUTO: 0 K/UL — SIGNIFICANT CHANGE UP (ref 0–0.2)
BASOPHILS NFR BLD AUTO: 0.4 % — SIGNIFICANT CHANGE UP (ref 0–2)
EOSINOPHIL # BLD AUTO: 0.2 K/UL — SIGNIFICANT CHANGE UP (ref 0–0.5)
EOSINOPHIL NFR BLD AUTO: 2.1 % — SIGNIFICANT CHANGE UP (ref 0–6)
HCT VFR BLD CALC: 40 % — SIGNIFICANT CHANGE UP (ref 34.5–45)
HGB BLD-MCNC: 12.9 G/DL — SIGNIFICANT CHANGE UP (ref 11.5–15.5)
LYMPHOCYTES # BLD AUTO: 0.8 K/UL — LOW (ref 1–3.3)
LYMPHOCYTES # BLD AUTO: 9.8 % — LOW (ref 13–44)
MCHC RBC-ENTMCNC: 24.4 PG — LOW (ref 27–34)
MCHC RBC-ENTMCNC: 32.2 G/DL — SIGNIFICANT CHANGE UP (ref 32–36)
MCV RBC AUTO: 75.8 FL — LOW (ref 80–100)
MONOCYTES # BLD AUTO: 0.6 K/UL — SIGNIFICANT CHANGE UP (ref 0–0.9)
MONOCYTES NFR BLD AUTO: 8.2 % — SIGNIFICANT CHANGE UP (ref 2–14)
NEUTROPHILS # BLD AUTO: 6.3 K/UL — SIGNIFICANT CHANGE UP (ref 1.8–7.4)
NEUTROPHILS NFR BLD AUTO: 79.5 % — HIGH (ref 43–77)
PLATELET # BLD AUTO: 244 K/UL — SIGNIFICANT CHANGE UP (ref 150–400)
RBC # BLD: 5.28 M/UL — HIGH (ref 3.8–5.2)
RBC # FLD: 15.1 % — HIGH (ref 10.3–14.5)
WBC # BLD: 7.9 K/UL — SIGNIFICANT CHANGE UP (ref 3.8–10.5)
WBC # FLD AUTO: 7.9 K/UL — SIGNIFICANT CHANGE UP (ref 3.8–10.5)

## 2018-04-10 PROCEDURE — 77387B: CUSTOM | Mod: 26

## 2018-04-11 PROCEDURE — 77387B: CUSTOM | Mod: 26

## 2018-04-12 ENCOUNTER — APPOINTMENT (OUTPATIENT)
Dept: PULMONOLOGY | Facility: CLINIC | Age: 68
End: 2018-04-12
Payer: MEDICARE

## 2018-04-12 VITALS
BODY MASS INDEX: 25.11 KG/M2 | OXYGEN SATURATION: 93 % | HEART RATE: 88 BPM | HEIGHT: 67 IN | DIASTOLIC BLOOD PRESSURE: 76 MMHG | SYSTOLIC BLOOD PRESSURE: 106 MMHG | WEIGHT: 160 LBS | RESPIRATION RATE: 17 BRPM

## 2018-04-12 PROCEDURE — 99214 OFFICE O/P EST MOD 30 MIN: CPT | Mod: 25

## 2018-04-12 PROCEDURE — 96372 THER/PROPH/DIAG INJ SC/IM: CPT

## 2018-04-12 RX ORDER — OMALIZUMAB 202.5 MG/1.4ML
150 INJECTION, SOLUTION SUBCUTANEOUS
Qty: 45 | Refills: 0 | Status: COMPLETED | OUTPATIENT
Start: 2018-04-12

## 2018-04-12 RX ADMIN — OMALIZUMAB 0 MG: 202.5 INJECTION, SOLUTION SUBCUTANEOUS at 00:00

## 2018-04-13 ENCOUNTER — MEDICATION RENEWAL (OUTPATIENT)
Age: 68
End: 2018-04-13

## 2018-04-17 ENCOUNTER — RESULT REVIEW (OUTPATIENT)
Age: 68
End: 2018-04-17

## 2018-04-17 ENCOUNTER — APPOINTMENT (OUTPATIENT)
Dept: HEMATOLOGY ONCOLOGY | Facility: CLINIC | Age: 68
End: 2018-04-17
Payer: MEDICARE

## 2018-04-17 VITALS
DIASTOLIC BLOOD PRESSURE: 72 MMHG | BODY MASS INDEX: 25.03 KG/M2 | RESPIRATION RATE: 16 BRPM | TEMPERATURE: 98.1 F | SYSTOLIC BLOOD PRESSURE: 146 MMHG | WEIGHT: 159.83 LBS | HEART RATE: 89 BPM | OXYGEN SATURATION: 97 %

## 2018-04-17 LAB
ALBUMIN SERPL ELPH-MCNC: 4 G/DL
ALP BLD-CCNC: 102 U/L
ALT SERPL-CCNC: 9 U/L
ANION GAP SERPL CALC-SCNC: 13 MMOL/L
AST SERPL-CCNC: 11 U/L
BASOPHILS # BLD AUTO: 0 K/UL — SIGNIFICANT CHANGE UP (ref 0–0.2)
BASOPHILS NFR BLD AUTO: 0.4 % — SIGNIFICANT CHANGE UP (ref 0–2)
BILIRUB SERPL-MCNC: 0.3 MG/DL
BUN SERPL-MCNC: 15 MG/DL
CALCIUM SERPL-MCNC: 9.1 MG/DL
CEA SERPL-MCNC: 7.7 NG/ML
CHLORIDE SERPL-SCNC: 101 MMOL/L
CO2 SERPL-SCNC: 24 MMOL/L
CREAT SERPL-MCNC: 0.77 MG/DL
EOSINOPHIL # BLD AUTO: 0.2 K/UL — SIGNIFICANT CHANGE UP (ref 0–0.5)
EOSINOPHIL NFR BLD AUTO: 2.5 % — SIGNIFICANT CHANGE UP (ref 0–6)
GLUCOSE SERPL-MCNC: 280 MG/DL
HCT VFR BLD CALC: 39.9 % — SIGNIFICANT CHANGE UP (ref 34.5–45)
HGB BLD-MCNC: 13.2 G/DL — SIGNIFICANT CHANGE UP (ref 11.5–15.5)
LYMPHOCYTES # BLD AUTO: 0.8 K/UL — LOW (ref 1–3.3)
LYMPHOCYTES # BLD AUTO: 9.4 % — LOW (ref 13–44)
MCHC RBC-ENTMCNC: 24.9 PG — LOW (ref 27–34)
MCHC RBC-ENTMCNC: 33 G/DL — SIGNIFICANT CHANGE UP (ref 32–36)
MCV RBC AUTO: 75.5 FL — LOW (ref 80–100)
MONOCYTES # BLD AUTO: 0.8 K/UL — SIGNIFICANT CHANGE UP (ref 0–0.9)
MONOCYTES NFR BLD AUTO: 9.8 % — SIGNIFICANT CHANGE UP (ref 2–14)
NEUTROPHILS # BLD AUTO: 6.4 K/UL — SIGNIFICANT CHANGE UP (ref 1.8–7.4)
NEUTROPHILS NFR BLD AUTO: 77.9 % — HIGH (ref 43–77)
PLATELET # BLD AUTO: 220 K/UL — SIGNIFICANT CHANGE UP (ref 150–400)
POTASSIUM SERPL-SCNC: 4.1 MMOL/L
PROT SERPL-MCNC: 7 G/DL
RBC # BLD: 5.29 M/UL — HIGH (ref 3.8–5.2)
RBC # FLD: 15.3 % — HIGH (ref 10.3–14.5)
SODIUM SERPL-SCNC: 138 MMOL/L
WBC # BLD: 8.2 K/UL — SIGNIFICANT CHANGE UP (ref 3.8–10.5)
WBC # FLD AUTO: 8.2 K/UL — SIGNIFICANT CHANGE UP (ref 3.8–10.5)

## 2018-04-17 PROCEDURE — 99214 OFFICE O/P EST MOD 30 MIN: CPT

## 2018-04-25 ENCOUNTER — APPOINTMENT (OUTPATIENT)
Dept: RADIATION ONCOLOGY | Facility: CLINIC | Age: 68
End: 2018-04-25
Payer: MEDICARE

## 2018-04-25 VITALS
SYSTOLIC BLOOD PRESSURE: 150 MMHG | BODY MASS INDEX: 25.55 KG/M2 | OXYGEN SATURATION: 96 % | HEIGHT: 67 IN | WEIGHT: 162.81 LBS | TEMPERATURE: 97.9 F | HEART RATE: 72 BPM | RESPIRATION RATE: 16 BRPM | DIASTOLIC BLOOD PRESSURE: 75 MMHG

## 2018-04-25 PROCEDURE — 99024 POSTOP FOLLOW-UP VISIT: CPT

## 2018-04-26 ENCOUNTER — APPOINTMENT (OUTPATIENT)
Dept: PULMONOLOGY | Facility: CLINIC | Age: 68
End: 2018-04-26
Payer: MEDICARE

## 2018-04-26 VITALS
WEIGHT: 162 LBS | DIASTOLIC BLOOD PRESSURE: 75 MMHG | OXYGEN SATURATION: 91 % | HEART RATE: 78 BPM | SYSTOLIC BLOOD PRESSURE: 130 MMHG | BODY MASS INDEX: 25.43 KG/M2 | HEIGHT: 67 IN

## 2018-04-26 PROCEDURE — 96372 THER/PROPH/DIAG INJ SC/IM: CPT

## 2018-04-26 RX ORDER — OMALIZUMAB 202.5 MG/1.4ML
150 INJECTION, SOLUTION SUBCUTANEOUS
Qty: 45 | Refills: 0 | Status: COMPLETED | OUTPATIENT
Start: 2018-04-26

## 2018-04-26 RX ADMIN — OMALIZUMAB 0 MG: 202.5 INJECTION, SOLUTION SUBCUTANEOUS at 00:00

## 2018-05-01 PROBLEM — N63.0 BREAST NODULE: Status: RESOLVED | Noted: 2018-01-09 | Resolved: 2018-05-01

## 2018-05-02 ENCOUNTER — FORM ENCOUNTER (OUTPATIENT)
Age: 68
End: 2018-05-02

## 2018-05-03 ENCOUNTER — OUTPATIENT (OUTPATIENT)
Dept: OUTPATIENT SERVICES | Facility: HOSPITAL | Age: 68
LOS: 1 days | End: 2018-05-03
Payer: MEDICARE

## 2018-05-03 ENCOUNTER — APPOINTMENT (OUTPATIENT)
Dept: THORACIC SURGERY | Facility: CLINIC | Age: 68
End: 2018-05-03
Payer: MEDICARE

## 2018-05-03 VITALS
OXYGEN SATURATION: 94 % | RESPIRATION RATE: 19 BRPM | HEART RATE: 82 BPM | BODY MASS INDEX: 25.22 KG/M2 | TEMPERATURE: 98.5 F | SYSTOLIC BLOOD PRESSURE: 132 MMHG | DIASTOLIC BLOOD PRESSURE: 60 MMHG | WEIGHT: 161 LBS

## 2018-05-03 DIAGNOSIS — H05.352 EXOSTOSIS OF LEFT ORBIT: Chronic | ICD-10-CM

## 2018-05-03 DIAGNOSIS — Z98.89 OTHER SPECIFIED POSTPROCEDURAL STATES: Chronic | ICD-10-CM

## 2018-05-03 DIAGNOSIS — N63.0 UNSPECIFIED LUMP IN UNSPECIFIED BREAST: ICD-10-CM

## 2018-05-03 DIAGNOSIS — I05.9 RHEUMATIC MITRAL VALVE DISEASE, UNSPECIFIED: ICD-10-CM

## 2018-05-03 DIAGNOSIS — I35.1 NONRHEUMATIC AORTIC (VALVE) INSUFFICIENCY: ICD-10-CM

## 2018-05-03 DIAGNOSIS — Z96.653 PRESENCE OF ARTIFICIAL KNEE JOINT, BILATERAL: Chronic | ICD-10-CM

## 2018-05-03 PROCEDURE — 99214 OFFICE O/P EST MOD 30 MIN: CPT

## 2018-05-03 PROCEDURE — 93306 TTE W/DOPPLER COMPLETE: CPT | Mod: 26

## 2018-05-03 PROCEDURE — 93306 TTE W/DOPPLER COMPLETE: CPT

## 2018-05-04 DIAGNOSIS — C44.520 SQUAMOUS CELL CARCINOMA OF ANAL SKIN: ICD-10-CM

## 2018-05-04 RX ORDER — BACLOFEN 10 MG/1
10 TABLET ORAL 3 TIMES DAILY
Qty: 1 | Refills: 1 | Status: COMPLETED | COMMUNITY
Start: 2018-04-12 | End: 2018-05-04

## 2018-05-04 RX ORDER — CAPECITABINE 500 MG/1
500 TABLET ORAL
Qty: 40 | Refills: 0 | Status: COMPLETED | COMMUNITY
Start: 2018-03-23 | End: 2018-05-04

## 2018-05-04 RX ORDER — FUROSEMIDE 20 MG/1
20 TABLET ORAL DAILY
Qty: 14 | Refills: 1 | Status: COMPLETED | COMMUNITY
Start: 2018-03-13 | End: 2018-05-04

## 2018-05-10 ENCOUNTER — APPOINTMENT (OUTPATIENT)
Dept: PULMONOLOGY | Facility: CLINIC | Age: 68
End: 2018-05-10
Payer: MEDICARE

## 2018-05-10 VITALS
HEIGHT: 67 IN | WEIGHT: 159.17 LBS | SYSTOLIC BLOOD PRESSURE: 104 MMHG | DIASTOLIC BLOOD PRESSURE: 64 MMHG | RESPIRATION RATE: 17 BRPM | OXYGEN SATURATION: 94 % | HEART RATE: 77 BPM | BODY MASS INDEX: 24.98 KG/M2

## 2018-05-10 PROCEDURE — 96372 THER/PROPH/DIAG INJ SC/IM: CPT

## 2018-05-10 RX ORDER — OMALIZUMAB 202.5 MG/1.4ML
150 INJECTION, SOLUTION SUBCUTANEOUS
Qty: 45 | Refills: 0 | Status: COMPLETED | OUTPATIENT
Start: 2018-05-10

## 2018-05-10 RX ADMIN — Medication 0 MG: at 00:00

## 2018-05-18 ENCOUNTER — OTHER (OUTPATIENT)
Age: 68
End: 2018-05-18

## 2018-05-19 ENCOUNTER — INPATIENT (INPATIENT)
Facility: HOSPITAL | Age: 68
LOS: 4 days | Discharge: HOME | DRG: 871 | End: 2018-05-24
Attending: HOSPITALIST | Admitting: HOSPITALIST
Payer: MEDICARE

## 2018-05-19 VITALS
RESPIRATION RATE: 18 BRPM | DIASTOLIC BLOOD PRESSURE: 59 MMHG | OXYGEN SATURATION: 94 % | SYSTOLIC BLOOD PRESSURE: 111 MMHG | TEMPERATURE: 103 F | HEART RATE: 109 BPM

## 2018-05-19 DIAGNOSIS — J45.909 UNSPECIFIED ASTHMA, UNCOMPLICATED: ICD-10-CM

## 2018-05-19 DIAGNOSIS — I10 ESSENTIAL (PRIMARY) HYPERTENSION: ICD-10-CM

## 2018-05-19 DIAGNOSIS — C20 MALIGNANT NEOPLASM OF RECTUM: ICD-10-CM

## 2018-05-19 DIAGNOSIS — E11.9 TYPE 2 DIABETES MELLITUS WITHOUT COMPLICATIONS: ICD-10-CM

## 2018-05-19 DIAGNOSIS — I48.2 CHRONIC ATRIAL FIBRILLATION: ICD-10-CM

## 2018-05-19 DIAGNOSIS — Z98.89 OTHER SPECIFIED POSTPROCEDURAL STATES: Chronic | ICD-10-CM

## 2018-05-19 DIAGNOSIS — H05.352 EXOSTOSIS OF LEFT ORBIT: Chronic | ICD-10-CM

## 2018-05-19 DIAGNOSIS — R50.9 FEVER, UNSPECIFIED: ICD-10-CM

## 2018-05-19 DIAGNOSIS — Z96.653 PRESENCE OF ARTIFICIAL KNEE JOINT, BILATERAL: Chronic | ICD-10-CM

## 2018-05-19 DIAGNOSIS — E27.40 UNSPECIFIED ADRENOCORTICAL INSUFFICIENCY: ICD-10-CM

## 2018-05-19 DIAGNOSIS — Z29.9 ENCOUNTER FOR PROPHYLACTIC MEASURES, UNSPECIFIED: ICD-10-CM

## 2018-05-19 DIAGNOSIS — A41.9 SEPSIS, UNSPECIFIED ORGANISM: ICD-10-CM

## 2018-05-19 LAB
ALBUMIN SERPL ELPH-MCNC: 3.6 G/DL — SIGNIFICANT CHANGE UP (ref 3.3–5)
ALP SERPL-CCNC: 98 U/L — SIGNIFICANT CHANGE UP (ref 40–120)
ALT FLD-CCNC: 16 U/L — SIGNIFICANT CHANGE UP (ref 10–45)
ANION GAP SERPL CALC-SCNC: 12 MMOL/L — SIGNIFICANT CHANGE UP (ref 5–17)
APPEARANCE UR: CLEAR — SIGNIFICANT CHANGE UP
APTT BLD: 42.1 SEC — HIGH (ref 27.5–37.4)
AST SERPL-CCNC: 22 U/L — SIGNIFICANT CHANGE UP (ref 10–40)
BASE EXCESS BLDV CALC-SCNC: 4.7 MMOL/L — HIGH (ref -2–2)
BASOPHILS # BLD AUTO: 0 K/UL — SIGNIFICANT CHANGE UP (ref 0–0.2)
BASOPHILS NFR BLD AUTO: 0.5 % — SIGNIFICANT CHANGE UP (ref 0–2)
BILIRUB SERPL-MCNC: 0.2 MG/DL — SIGNIFICANT CHANGE UP (ref 0.2–1.2)
BILIRUB UR-MCNC: NEGATIVE — SIGNIFICANT CHANGE UP
BUN SERPL-MCNC: 8 MG/DL — SIGNIFICANT CHANGE UP (ref 7–23)
CA-I SERPL-SCNC: 1.09 MMOL/L — LOW (ref 1.12–1.3)
CALCIUM SERPL-MCNC: 8.5 MG/DL — SIGNIFICANT CHANGE UP (ref 8.4–10.5)
CHLORIDE BLDV-SCNC: 104 MMOL/L — SIGNIFICANT CHANGE UP (ref 96–108)
CHLORIDE SERPL-SCNC: 98 MMOL/L — SIGNIFICANT CHANGE UP (ref 96–108)
CO2 BLDV-SCNC: 31 MMOL/L — HIGH (ref 22–30)
CO2 SERPL-SCNC: 25 MMOL/L — SIGNIFICANT CHANGE UP (ref 22–31)
COLOR SPEC: YELLOW — SIGNIFICANT CHANGE UP
CREAT SERPL-MCNC: 0.91 MG/DL — SIGNIFICANT CHANGE UP (ref 0.5–1.3)
DIFF PNL FLD: ABNORMAL
EOSINOPHIL # BLD AUTO: 0 K/UL — SIGNIFICANT CHANGE UP (ref 0–0.5)
EOSINOPHIL NFR BLD AUTO: 0.2 % — SIGNIFICANT CHANGE UP (ref 0–6)
EPI CELLS # UR: SIGNIFICANT CHANGE UP /HPF
GAS PNL BLDV: 132 MMOL/L — LOW (ref 136–145)
GAS PNL BLDV: SIGNIFICANT CHANGE UP
GAS PNL BLDV: SIGNIFICANT CHANGE UP
GLUCOSE BLDC GLUCOMTR-MCNC: 175 MG/DL — HIGH (ref 70–99)
GLUCOSE BLDC GLUCOMTR-MCNC: 91 MG/DL — SIGNIFICANT CHANGE UP (ref 70–99)
GLUCOSE BLDV-MCNC: 123 MG/DL — HIGH (ref 70–99)
GLUCOSE SERPL-MCNC: 124 MG/DL — HIGH (ref 70–99)
GLUCOSE UR QL: NEGATIVE — SIGNIFICANT CHANGE UP
HCO3 BLDV-SCNC: 29 MMOL/L — SIGNIFICANT CHANGE UP (ref 21–29)
HCT VFR BLD CALC: 43.4 % — SIGNIFICANT CHANGE UP (ref 34.5–45)
HCT VFR BLDA CALC: 42 % — SIGNIFICANT CHANGE UP (ref 39–50)
HGB BLD CALC-MCNC: 13.6 G/DL — SIGNIFICANT CHANGE UP (ref 11.5–15.5)
HGB BLD-MCNC: 13.6 G/DL — SIGNIFICANT CHANGE UP (ref 11.5–15.5)
INR BLD: 1.43 RATIO — HIGH (ref 0.88–1.16)
KETONES UR-MCNC: ABNORMAL
LACTATE BLDV-MCNC: 1 MMOL/L — SIGNIFICANT CHANGE UP (ref 0.7–2)
LEUKOCYTE ESTERASE UR-ACNC: NEGATIVE — SIGNIFICANT CHANGE UP
LYMPHOCYTES # BLD AUTO: 1.2 K/UL — SIGNIFICANT CHANGE UP (ref 1–3.3)
LYMPHOCYTES # BLD AUTO: 22.7 % — SIGNIFICANT CHANGE UP (ref 13–44)
MAGNESIUM SERPL-MCNC: 2.1 MG/DL — SIGNIFICANT CHANGE UP (ref 1.6–2.6)
MCHC RBC-ENTMCNC: 23.9 PG — LOW (ref 27–34)
MCHC RBC-ENTMCNC: 31.4 GM/DL — LOW (ref 32–36)
MCV RBC AUTO: 76.1 FL — LOW (ref 80–100)
MONOCYTES # BLD AUTO: 0.7 K/UL — SIGNIFICANT CHANGE UP (ref 0–0.9)
MONOCYTES NFR BLD AUTO: 13.5 % — SIGNIFICANT CHANGE UP (ref 2–14)
NEUTROPHILS # BLD AUTO: 3.2 K/UL — SIGNIFICANT CHANGE UP (ref 1.8–7.4)
NEUTROPHILS NFR BLD AUTO: 63.1 % — SIGNIFICANT CHANGE UP (ref 43–77)
NITRITE UR-MCNC: NEGATIVE — SIGNIFICANT CHANGE UP
PCO2 BLDV: 45 MMHG — SIGNIFICANT CHANGE UP (ref 35–50)
PH BLDV: 7.43 — SIGNIFICANT CHANGE UP (ref 7.35–7.45)
PH UR: 6 — SIGNIFICANT CHANGE UP (ref 5–8)
PLATELET # BLD AUTO: 156 K/UL — SIGNIFICANT CHANGE UP (ref 150–400)
PO2 BLDV: 37 MMHG — SIGNIFICANT CHANGE UP (ref 25–45)
POTASSIUM BLDV-SCNC: 3.8 MMOL/L — SIGNIFICANT CHANGE UP (ref 3.5–5)
POTASSIUM SERPL-MCNC: 4.1 MMOL/L — SIGNIFICANT CHANGE UP (ref 3.5–5.3)
POTASSIUM SERPL-SCNC: 4.1 MMOL/L — SIGNIFICANT CHANGE UP (ref 3.5–5.3)
PROT SERPL-MCNC: 7.1 G/DL — SIGNIFICANT CHANGE UP (ref 6–8.3)
PROT UR-MCNC: 30 MG/DL
PROTHROM AB SERPL-ACNC: 15.6 SEC — HIGH (ref 9.8–12.7)
RAPID RVP RESULT: SIGNIFICANT CHANGE UP
RBC # BLD: 5.7 M/UL — HIGH (ref 3.8–5.2)
RBC # FLD: 15.2 % — HIGH (ref 10.3–14.5)
RBC CASTS # UR COMP ASSIST: ABNORMAL /HPF (ref 0–2)
SAO2 % BLDV: 64 % — LOW (ref 67–88)
SODIUM SERPL-SCNC: 135 MMOL/L — SIGNIFICANT CHANGE UP (ref 135–145)
SP GR SPEC: 1.02 — SIGNIFICANT CHANGE UP (ref 1.01–1.02)
TROPONIN T SERPL-MCNC: <0.01 NG/ML — SIGNIFICANT CHANGE UP (ref 0–0.06)
UROBILINOGEN FLD QL: NEGATIVE — SIGNIFICANT CHANGE UP
WBC # BLD: 5 K/UL — SIGNIFICANT CHANGE UP (ref 3.8–10.5)
WBC # FLD AUTO: 5 K/UL — SIGNIFICANT CHANGE UP (ref 3.8–10.5)
WBC UR QL: SIGNIFICANT CHANGE UP /HPF (ref 0–5)

## 2018-05-19 PROCEDURE — 93010 ELECTROCARDIOGRAM REPORT: CPT

## 2018-05-19 PROCEDURE — 99223 1ST HOSP IP/OBS HIGH 75: CPT | Mod: AI,GC

## 2018-05-19 PROCEDURE — 70450 CT HEAD/BRAIN W/O DYE: CPT | Mod: 26

## 2018-05-19 PROCEDURE — 71045 X-RAY EXAM CHEST 1 VIEW: CPT | Mod: 26

## 2018-05-19 PROCEDURE — 99285 EMERGENCY DEPT VISIT HI MDM: CPT | Mod: 25

## 2018-05-19 RX ORDER — SODIUM CHLORIDE 9 MG/ML
500 INJECTION INTRAMUSCULAR; INTRAVENOUS; SUBCUTANEOUS ONCE
Qty: 0 | Refills: 0 | Status: COMPLETED | OUTPATIENT
Start: 2018-05-19 | End: 2018-05-19

## 2018-05-19 RX ORDER — INSULIN LISPRO 100/ML
VIAL (ML) SUBCUTANEOUS
Qty: 0 | Refills: 0 | Status: DISCONTINUED | OUTPATIENT
Start: 2018-05-19 | End: 2018-05-24

## 2018-05-19 RX ORDER — PANTOPRAZOLE SODIUM 20 MG/1
40 TABLET, DELAYED RELEASE ORAL
Qty: 0 | Refills: 0 | Status: DISCONTINUED | OUTPATIENT
Start: 2018-05-19 | End: 2018-05-24

## 2018-05-19 RX ORDER — METRONIDAZOLE 500 MG
TABLET ORAL
Qty: 0 | Refills: 0 | Status: DISCONTINUED | OUTPATIENT
Start: 2018-05-19 | End: 2018-05-21

## 2018-05-19 RX ORDER — LORATADINE 10 MG/1
10 TABLET ORAL DAILY
Qty: 0 | Refills: 0 | Status: DISCONTINUED | OUTPATIENT
Start: 2018-05-19 | End: 2018-05-24

## 2018-05-19 RX ORDER — DEXTROSE 50 % IN WATER 50 %
15 SYRINGE (ML) INTRAVENOUS ONCE
Qty: 0 | Refills: 0 | Status: DISCONTINUED | OUTPATIENT
Start: 2018-05-19 | End: 2018-05-24

## 2018-05-19 RX ORDER — ONDANSETRON 8 MG/1
4 TABLET, FILM COATED ORAL ONCE
Qty: 0 | Refills: 0 | Status: COMPLETED | OUTPATIENT
Start: 2018-05-19 | End: 2018-05-19

## 2018-05-19 RX ORDER — CEFEPIME 1 G/1
1000 INJECTION, POWDER, FOR SOLUTION INTRAMUSCULAR; INTRAVENOUS ONCE
Qty: 0 | Refills: 0 | Status: COMPLETED | OUTPATIENT
Start: 2018-05-19 | End: 2018-05-19

## 2018-05-19 RX ORDER — METRONIDAZOLE 500 MG
500 TABLET ORAL EVERY 8 HOURS
Qty: 0 | Refills: 0 | Status: DISCONTINUED | OUTPATIENT
Start: 2018-05-20 | End: 2018-05-21

## 2018-05-19 RX ORDER — MONTELUKAST 4 MG/1
1 TABLET, CHEWABLE ORAL
Qty: 30 | Refills: 0 | OUTPATIENT

## 2018-05-19 RX ORDER — DIGOXIN 250 MCG
0.25 TABLET ORAL DAILY
Qty: 0 | Refills: 0 | Status: DISCONTINUED | OUTPATIENT
Start: 2018-05-19 | End: 2018-05-24

## 2018-05-19 RX ORDER — VANCOMYCIN HCL 1 G
1000 VIAL (EA) INTRAVENOUS EVERY 12 HOURS
Qty: 0 | Refills: 0 | Status: DISCONTINUED | OUTPATIENT
Start: 2018-05-19 | End: 2018-05-20

## 2018-05-19 RX ORDER — HYDROCORTISONE 20 MG
100 TABLET ORAL EVERY 8 HOURS
Qty: 0 | Refills: 0 | Status: DISCONTINUED | OUTPATIENT
Start: 2018-05-19 | End: 2018-05-21

## 2018-05-19 RX ORDER — ALBUTEROL 90 UG/1
1 AEROSOL, METERED ORAL EVERY 4 HOURS
Qty: 0 | Refills: 0 | Status: DISCONTINUED | OUTPATIENT
Start: 2018-05-19 | End: 2018-05-24

## 2018-05-19 RX ORDER — ACETAMINOPHEN 500 MG
650 TABLET ORAL ONCE
Qty: 0 | Refills: 0 | Status: COMPLETED | OUTPATIENT
Start: 2018-05-19 | End: 2018-05-19

## 2018-05-19 RX ORDER — TIOTROPIUM BROMIDE 18 UG/1
1 CAPSULE ORAL; RESPIRATORY (INHALATION) DAILY
Qty: 0 | Refills: 0 | Status: DISCONTINUED | OUTPATIENT
Start: 2018-05-19 | End: 2018-05-24

## 2018-05-19 RX ORDER — DEXTROSE 50 % IN WATER 50 %
25 SYRINGE (ML) INTRAVENOUS ONCE
Qty: 0 | Refills: 0 | Status: DISCONTINUED | OUTPATIENT
Start: 2018-05-19 | End: 2018-05-24

## 2018-05-19 RX ORDER — MONTELUKAST 4 MG/1
10 TABLET, CHEWABLE ORAL DAILY
Qty: 0 | Refills: 0 | Status: DISCONTINUED | OUTPATIENT
Start: 2018-05-19 | End: 2018-05-24

## 2018-05-19 RX ORDER — APIXABAN 2.5 MG/1
5 TABLET, FILM COATED ORAL EVERY 12 HOURS
Qty: 0 | Refills: 0 | Status: DISCONTINUED | OUTPATIENT
Start: 2018-05-19 | End: 2018-05-24

## 2018-05-19 RX ORDER — METRONIDAZOLE 500 MG
500 TABLET ORAL ONCE
Qty: 0 | Refills: 0 | Status: COMPLETED | OUTPATIENT
Start: 2018-05-19 | End: 2018-05-19

## 2018-05-19 RX ORDER — GLUCAGON INJECTION, SOLUTION 0.5 MG/.1ML
1 INJECTION, SOLUTION SUBCUTANEOUS ONCE
Qty: 0 | Refills: 0 | Status: DISCONTINUED | OUTPATIENT
Start: 2018-05-19 | End: 2018-05-24

## 2018-05-19 RX ORDER — SODIUM CHLORIDE 9 MG/ML
1000 INJECTION INTRAMUSCULAR; INTRAVENOUS; SUBCUTANEOUS ONCE
Qty: 0 | Refills: 0 | Status: COMPLETED | OUTPATIENT
Start: 2018-05-19 | End: 2018-05-19

## 2018-05-19 RX ORDER — INSULIN GLARGINE 100 [IU]/ML
12 INJECTION, SOLUTION SUBCUTANEOUS AT BEDTIME
Qty: 0 | Refills: 0 | Status: DISCONTINUED | OUTPATIENT
Start: 2018-05-19 | End: 2018-05-24

## 2018-05-19 RX ORDER — BUDESONIDE AND FORMOTEROL FUMARATE DIHYDRATE 160; 4.5 UG/1; UG/1
2 AEROSOL RESPIRATORY (INHALATION)
Qty: 0 | Refills: 0 | Status: DISCONTINUED | OUTPATIENT
Start: 2018-05-19 | End: 2018-05-24

## 2018-05-19 RX ORDER — VALSARTAN 80 MG/1
40 TABLET ORAL DAILY
Qty: 0 | Refills: 0 | Status: DISCONTINUED | OUTPATIENT
Start: 2018-05-19 | End: 2018-05-19

## 2018-05-19 RX ORDER — ALBUTEROL 90 UG/1
3 AEROSOL, METERED ORAL
Qty: 0 | Refills: 0 | COMMUNITY

## 2018-05-19 RX ORDER — CEFEPIME 1 G/1
2000 INJECTION, POWDER, FOR SOLUTION INTRAMUSCULAR; INTRAVENOUS EVERY 8 HOURS
Qty: 0 | Refills: 0 | Status: DISCONTINUED | OUTPATIENT
Start: 2018-05-19 | End: 2018-05-23

## 2018-05-19 RX ORDER — INSULIN LISPRO 100/ML
VIAL (ML) SUBCUTANEOUS AT BEDTIME
Qty: 0 | Refills: 0 | Status: DISCONTINUED | OUTPATIENT
Start: 2018-05-19 | End: 2018-05-24

## 2018-05-19 RX ORDER — SODIUM CHLORIDE 9 MG/ML
1000 INJECTION, SOLUTION INTRAVENOUS
Qty: 0 | Refills: 0 | Status: DISCONTINUED | OUTPATIENT
Start: 2018-05-19 | End: 2018-05-24

## 2018-05-19 RX ORDER — DEXTROSE 50 % IN WATER 50 %
12.5 SYRINGE (ML) INTRAVENOUS ONCE
Qty: 0 | Refills: 0 | Status: DISCONTINUED | OUTPATIENT
Start: 2018-05-19 | End: 2018-05-24

## 2018-05-19 RX ORDER — VANCOMYCIN HCL 1 G
1000 VIAL (EA) INTRAVENOUS ONCE
Qty: 0 | Refills: 0 | Status: COMPLETED | OUTPATIENT
Start: 2018-05-19 | End: 2018-05-19

## 2018-05-19 RX ORDER — CEFEPIME 1 G/1
INJECTION, POWDER, FOR SOLUTION INTRAMUSCULAR; INTRAVENOUS
Qty: 0 | Refills: 0 | Status: DISCONTINUED | OUTPATIENT
Start: 2018-05-19 | End: 2018-05-19

## 2018-05-19 RX ADMIN — SODIUM CHLORIDE 1000 MILLILITER(S): 9 INJECTION INTRAMUSCULAR; INTRAVENOUS; SUBCUTANEOUS at 19:05

## 2018-05-19 RX ADMIN — INSULIN GLARGINE 12 UNIT(S): 100 INJECTION, SOLUTION SUBCUTANEOUS at 21:24

## 2018-05-19 RX ADMIN — SODIUM CHLORIDE 1000 MILLILITER(S): 9 INJECTION INTRAMUSCULAR; INTRAVENOUS; SUBCUTANEOUS at 15:00

## 2018-05-19 RX ADMIN — Medication 650 MILLIGRAM(S): at 12:00

## 2018-05-19 RX ADMIN — Medication 250 MILLIGRAM(S): at 22:00

## 2018-05-19 RX ADMIN — CEFEPIME 100 MILLIGRAM(S): 1 INJECTION, POWDER, FOR SOLUTION INTRAMUSCULAR; INTRAVENOUS at 11:50

## 2018-05-19 RX ADMIN — Medication 150 MILLIGRAM(S): at 18:05

## 2018-05-19 RX ADMIN — BUDESONIDE AND FORMOTEROL FUMARATE DIHYDRATE 2 PUFF(S): 160; 4.5 AEROSOL RESPIRATORY (INHALATION) at 18:06

## 2018-05-19 RX ADMIN — Medication 650 MILLIGRAM(S): at 14:32

## 2018-05-19 RX ADMIN — Medication 100 MILLIGRAM(S): at 22:02

## 2018-05-19 RX ADMIN — CEFEPIME 100 MILLIGRAM(S): 1 INJECTION, POWDER, FOR SOLUTION INTRAMUSCULAR; INTRAVENOUS at 21:01

## 2018-05-19 RX ADMIN — Medication 0.25 MILLIGRAM(S): at 18:05

## 2018-05-19 RX ADMIN — TIOTROPIUM BROMIDE 1 CAPSULE(S): 18 CAPSULE ORAL; RESPIRATORY (INHALATION) at 21:59

## 2018-05-19 RX ADMIN — ONDANSETRON 4 MILLIGRAM(S): 8 TABLET, FILM COATED ORAL at 15:00

## 2018-05-19 RX ADMIN — SODIUM CHLORIDE 1000 MILLILITER(S): 9 INJECTION INTRAMUSCULAR; INTRAVENOUS; SUBCUTANEOUS at 11:52

## 2018-05-19 RX ADMIN — Medication 250 MILLIGRAM(S): at 12:23

## 2018-05-19 RX ADMIN — Medication 4 MILLIGRAM(S): at 18:05

## 2018-05-19 RX ADMIN — APIXABAN 5 MILLIGRAM(S): 2.5 TABLET, FILM COATED ORAL at 18:00

## 2018-05-19 RX ADMIN — MONTELUKAST 10 MILLIGRAM(S): 4 TABLET, CHEWABLE ORAL at 18:00

## 2018-05-19 RX ADMIN — Medication 650 MILLIGRAM(S): at 20:31

## 2018-05-19 NOTE — H&P ADULT - PROBLEM SELECTOR PLAN 2
-anorectal SCC dx in 2016, s/p 2 cycles chemotherapy/radiation (last 1 month ago)  -pt. follows with Dr. King, will consult [facility]

## 2018-05-19 NOTE — H&P ADULT - PROBLEM SELECTOR PLAN 5
-saturating 95% on RA with productive cough  -c/w albuterol, spiriva, montelukast, breo not on formulary will do symbicort

## 2018-05-19 NOTE — H&P ADULT - ATTENDING COMMENTS
Would continue with cefepime for broad spectrum coverage, and add levaquin for anaerobic coverage. Await blood cultures and urine cultures. Patient currently denies any pain or discomfort.   Hypotension noted, bolus with NS and monitor BP closely.   May need stress dose steroids if hypotension does not respond to IVF. Would continue with cefepime for broad spectrum coverage, and add flagyl for anaerobic coverage (allergic to levaquin and penicillins). Await blood cultures and urine cultures. Patient currently denies any pain or discomfort.   Hypotension noted, bolus with NS and monitor BP closely.   May need stress dose steroids if hypotension does not respond to IVF.

## 2018-05-19 NOTE — H&P ADULT - NSHPREVIEWOFSYSTEMS_GEN_ALL_CORE
REVIEW OF SYSTEMS:    CONSTITUTIONAL: No weakness, +fevers or chills  EYES/ENT: No visual changes; +right eye erythema No vertigo or throat pain   NECK: No pain or stiffness  RESPIRATORY: + productive cough, wheezing, hemoptysis; No shortness of breath  CARDIOVASCULAR: No chest pain or palpitations  GASTROINTESTINAL: No abdominal or epigastric pain. No nausea, vomiting, or hematemesis; No diarrhea or constipation. No melena or hematochezia.  GENITOURINARY: No dysuria, frequency or hematuria  NEUROLOGICAL: No numbness or weakness  SKIN: No itching, rashes

## 2018-05-19 NOTE — ED PROVIDER NOTE - MEDICAL DECISION MAKING DETAILS
67 year old female with hx of rectal cancer s.p radiation and ongoing chemotherapy. Last chemotherapy was x2 weeks ago with unknown hx of neutropenia presents with fever of 100.7 at home and endorses cough, non productive and frequent urination. No SOB, no chest pain, no sore throat, no rashes. Patient endorses general malaise and weakness and difficulty getting to bathroom and feels unsteady on her feet. Non focal neuro exam. With hx of cancer will CT to r/o metastatic disease and concerned for infectious etiology. Likely UTI vs. pneumonia without knowing last white count, will given vancomycin and Cefepime, will contact oncology, and will require admission.

## 2018-05-19 NOTE — ED ADULT NURSE NOTE - OBJECTIVE STATEMENT
67F pt AxOx3 BIBA from home presenting w/ fever/chills since yesterday and productive cough. Pt states she took Tylenol yesterday and when she woke up early this am. Pt is on chemo/radiation for colorectal CA, last session 2wks ago. Pt denies CP/SOB/N/V/D. Pt states she has new onset urinary INC that began when she started chemo. Pt denies urinary burning. Pt denies abd pain. Upon assessment, L lung grossly clear, rhonchi auscultated to R base, resp even, unlabored, no resp distress noted, +productive cough w/ thick, yellow sputum noted. Abd soft/NT/ND/+BSx4. No suprapubic tenderness noted. Pt is febrile and warm to touch, non-diaphoretic. PowerPort accessed under aseptic technique. Labs drawn and sent. Pt administered NS and Tylenol as per MD. Straight cath performed under sterile technique witnessed by 2RNs - drained 100 cloudy, yellow urine, specimens collected and sent. Hygienic and comfort needs met. Call bell within reach. Blankets provided to pt. MD at bedside for eval. Safety maintained. Will continue to monitor.

## 2018-05-19 NOTE — H&P ADULT - PROBLEM SELECTOR PLAN 6
-A1C January 2018 7.8, f/u A1C in AM  -c/w home lantus 12 units at bedtime, hold premeal 7units TID for now, c/w ISS before meals and at bedtime

## 2018-05-19 NOTE — H&P ADULT - NSHPPHYSICALEXAM_GEN_ALL_CORE
General: WN/WD NAD  Neurology: A&Ox3, nonfocal, POOLE x 4  Eyes: PERRLA/ EOMI, Gross vision intact  ENT/Neck: Neck supple, trachea midline, No JVD, Gross hearing intact  Respiratory: CTA B/L, No wheezing, rales, rhonchi  CV: RRR, S1S2, no murmurs, rubs or gallops  Abdominal: Soft, NT, ND +BS,   Extremities: No edema, + peripheral pulses  Skin: No Rashes, Hematoma, Ecchymosis  Incisions:   Tubes: General: WN/WD NAD  Neurology: A&Ox3, nonfocal, POOLE x 4  Eyes: PERRLA/ EOMI, Gross vision intact, right eye erythematous, no TTP  ENT/Neck: Neck supple, trachea midline, No JVD, Gross hearing intact  Respiratory: CTA B/L, No wheezing, rales, rhonchi, thick yellow sputum visualized after coughing  CV: RRR, S1S2, no murmurs, rubs or gallops  Chest: Right chest wall port   Abdominal: Soft, NT, ND +BS  Rectal: 3 small skin tags, no hemorrhoid visualized   Back: kyphosis   Extremities: No edema, + peripheral pulses, no LE swelling   Skin: No Rashes, Hematoma, Ecchymosis
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
## 2018-05-19 NOTE — H&P ADULT - NSHPLABSRESULTS_GEN_ALL_CORE
13.6   5.0   )-----------( 156      ( 19 May 2018 12:00 )             43.4     05-19    135  |  98  |  8   ----------------------------<  124<H>  4.1   |  25  |  0.91    Ca    8.5      19 May 2018 12:00  Mg     2.1     05-19    TPro  7.1  /  Alb  3.6  /  TBili  0.2  /  DBili  x   /  AST  22  /  ALT  16  /  AlkPhos  98  05-19 13.6   5.0   )-----------( 156      ( 19 May 2018 12:00 )             43.4     05-19    135  |  98  |  8   ----------------------------<  124<H>  4.1   |  25  |  0.91    Ca    8.5      19 May 2018 12:00  Mg     2.1     05-19    TPro  7.1  /  Alb  3.6  /  TBili  0.2  /  DBili  x   /  AST  22  /  ALT  16  /  AlkPhos  98  05-19    RVP negative  UA negative  CXR clear lungs  CT head negative
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
## 2018-05-19 NOTE — ED PROVIDER NOTE - OBJECTIVE STATEMENT
67 year old female with pmhx of colorectal cancer s/p radiation and chemotherapy, COPD, Afib, asthma presents with fever of 100.7 measured at home this morning associated with urinary frequency, unsteady ambulation and cough for the past several days. Denies dysuria, vomiting or nausea. No burning urination. No rashes. Last chemotherapy sessions x2 weeks ago. Allergic to aspirin.   Colon/rectal surgeon- Ag Tejada
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
## 2018-05-19 NOTE — ED PROVIDER NOTE - PROGRESS NOTE DETAILS
ua neg - pt not feeling better - hr flucatuating last hr 109 -- bp transient 89/60 110.60 w fluids - will admit hospitalist called ua neg - pt not feeling better - hr flucatuating last hr 109 -- bp transient 89/60 --110/.60 w fluids - will admit hospitalist called pt co lightheaqded bp 110 /60 - no cp -- ekg done with bigeminy - trop and mg added admission changed to tele

## 2018-05-19 NOTE — H&P ADULT - PROBLEM SELECTOR PLAN 3
-HR controlled, tachycardic on admission  -weakness/unsteady gait per history, non-focal neuro exam, CT head negative  -CHADSVASC of 5, c/w digoxin and Eliquis

## 2018-05-20 LAB
ANION GAP SERPL CALC-SCNC: 11 MMOL/L — SIGNIFICANT CHANGE UP (ref 5–17)
ANISOCYTOSIS BLD QL: SLIGHT — SIGNIFICANT CHANGE UP
BASOPHILS # BLD AUTO: 0 K/UL — SIGNIFICANT CHANGE UP (ref 0–0.2)
BASOPHILS NFR BLD AUTO: 0 % — SIGNIFICANT CHANGE UP (ref 0–2)
BUN SERPL-MCNC: 10 MG/DL — SIGNIFICANT CHANGE UP (ref 7–23)
CALCIUM SERPL-MCNC: 8.9 MG/DL — SIGNIFICANT CHANGE UP (ref 8.4–10.5)
CHLORIDE SERPL-SCNC: 104 MMOL/L — SIGNIFICANT CHANGE UP (ref 96–108)
CO2 SERPL-SCNC: 23 MMOL/L — SIGNIFICANT CHANGE UP (ref 22–31)
CREAT SERPL-MCNC: 0.65 MG/DL — SIGNIFICANT CHANGE UP (ref 0.5–1.3)
CULTURE RESULTS: NO GROWTH — SIGNIFICANT CHANGE UP
EOSINOPHIL # BLD AUTO: 0 K/UL — SIGNIFICANT CHANGE UP (ref 0–0.5)
EOSINOPHIL NFR BLD AUTO: 0 % — SIGNIFICANT CHANGE UP (ref 0–6)
GIANT PLATELETS BLD QL SMEAR: PRESENT — SIGNIFICANT CHANGE UP
GLUCOSE BLDC GLUCOMTR-MCNC: 215 MG/DL — HIGH (ref 70–99)
GLUCOSE BLDC GLUCOMTR-MCNC: 261 MG/DL — HIGH (ref 70–99)
GLUCOSE BLDC GLUCOMTR-MCNC: 264 MG/DL — HIGH (ref 70–99)
GLUCOSE BLDC GLUCOMTR-MCNC: 275 MG/DL — HIGH (ref 70–99)
GLUCOSE SERPL-MCNC: 290 MG/DL — HIGH (ref 70–99)
HBA1C BLD-MCNC: 7.8 % — HIGH (ref 4–5.6)
HCT VFR BLD CALC: 39.7 % — SIGNIFICANT CHANGE UP (ref 34.5–45)
HGB BLD-MCNC: 12.8 G/DL — SIGNIFICANT CHANGE UP (ref 11.5–15.5)
HYPOCHROMIA BLD QL: SIGNIFICANT CHANGE UP
HYPOSEGMENTATION: PRESENT — SIGNIFICANT CHANGE UP
LG PLATELETS BLD QL AUTO: SLIGHT — SIGNIFICANT CHANGE UP
LYMPHOCYTES # BLD AUTO: 0.41 K/UL — LOW (ref 1–3.3)
LYMPHOCYTES # BLD AUTO: 13 % — SIGNIFICANT CHANGE UP (ref 13–44)
MAGNESIUM SERPL-MCNC: 2.2 MG/DL — SIGNIFICANT CHANGE UP (ref 1.6–2.6)
MANUAL SMEAR VERIFICATION: SIGNIFICANT CHANGE UP
MCHC RBC-ENTMCNC: 23.6 PG — LOW (ref 27–34)
MCHC RBC-ENTMCNC: 32.2 GM/DL — SIGNIFICANT CHANGE UP (ref 32–36)
MCV RBC AUTO: 73.1 FL — LOW (ref 80–100)
METAMYELOCYTES # FLD: 1 % — HIGH (ref 0–0)
MICROCYTES BLD QL: SIGNIFICANT CHANGE UP
MONOCYTES # BLD AUTO: 0.06 K/UL — SIGNIFICANT CHANGE UP (ref 0–0.9)
MONOCYTES NFR BLD AUTO: 2 % — SIGNIFICANT CHANGE UP (ref 2–14)
NEUTROPHILS # BLD AUTO: 2.62 K/UL — SIGNIFICANT CHANGE UP (ref 1.8–7.4)
NEUTROPHILS NFR BLD AUTO: 77 % — SIGNIFICANT CHANGE UP (ref 43–77)
NEUTS BAND # BLD: 7 % — SIGNIFICANT CHANGE UP (ref 0–8)
NRBC # BLD: 0 /100 — SIGNIFICANT CHANGE UP (ref 0–0)
PHOSPHATE SERPL-MCNC: 3 MG/DL — SIGNIFICANT CHANGE UP (ref 2.5–4.5)
PLAT MORPH BLD: ABNORMAL
PLATELET # BLD AUTO: 138 K/UL — LOW (ref 150–400)
POIKILOCYTOSIS BLD QL AUTO: SLIGHT — SIGNIFICANT CHANGE UP
POTASSIUM SERPL-MCNC: 4.7 MMOL/L — SIGNIFICANT CHANGE UP (ref 3.5–5.3)
POTASSIUM SERPL-SCNC: 4.7 MMOL/L — SIGNIFICANT CHANGE UP (ref 3.5–5.3)
RBC # BLD: 5.43 M/UL — HIGH (ref 3.8–5.2)
RBC # FLD: 17.5 % — HIGH (ref 10.3–14.5)
RBC BLD AUTO: ABNORMAL
SODIUM SERPL-SCNC: 138 MMOL/L — SIGNIFICANT CHANGE UP (ref 135–145)
SPECIMEN SOURCE: SIGNIFICANT CHANGE UP
VANCOMYCIN TROUGH SERPL-MCNC: 9.7 UG/ML — LOW (ref 10–20)
WBC # BLD: 3.12 K/UL — LOW (ref 3.8–10.5)
WBC # FLD AUTO: 3.12 K/UL — LOW (ref 3.8–10.5)

## 2018-05-20 PROCEDURE — 99233 SBSQ HOSP IP/OBS HIGH 50: CPT | Mod: GC

## 2018-05-20 PROCEDURE — 74176 CT ABD & PELVIS W/O CONTRAST: CPT | Mod: 26

## 2018-05-20 PROCEDURE — 71250 CT THORAX DX C-: CPT | Mod: 26

## 2018-05-20 RX ORDER — ONDANSETRON 8 MG/1
4 TABLET, FILM COATED ORAL ONCE
Qty: 0 | Refills: 0 | Status: COMPLETED | OUTPATIENT
Start: 2018-05-20 | End: 2018-05-20

## 2018-05-20 RX ORDER — VANCOMYCIN HCL 1 G
1250 VIAL (EA) INTRAVENOUS EVERY 12 HOURS
Qty: 0 | Refills: 0 | Status: DISCONTINUED | OUTPATIENT
Start: 2018-05-20 | End: 2018-05-21

## 2018-05-20 RX ADMIN — CEFEPIME 100 MILLIGRAM(S): 1 INJECTION, POWDER, FOR SOLUTION INTRAMUSCULAR; INTRAVENOUS at 13:41

## 2018-05-20 RX ADMIN — Medication 2: at 07:54

## 2018-05-20 RX ADMIN — Medication 100 MILLIGRAM(S): at 14:24

## 2018-05-20 RX ADMIN — BUDESONIDE AND FORMOTEROL FUMARATE DIHYDRATE 2 PUFF(S): 160; 4.5 AEROSOL RESPIRATORY (INHALATION) at 18:23

## 2018-05-20 RX ADMIN — INSULIN GLARGINE 12 UNIT(S): 100 INJECTION, SOLUTION SUBCUTANEOUS at 21:17

## 2018-05-20 RX ADMIN — Medication 100 MILLIGRAM(S): at 00:22

## 2018-05-20 RX ADMIN — Medication 150 MILLIGRAM(S): at 18:23

## 2018-05-20 RX ADMIN — APIXABAN 5 MILLIGRAM(S): 2.5 TABLET, FILM COATED ORAL at 05:01

## 2018-05-20 RX ADMIN — Medication 100 MILLIGRAM(S): at 05:01

## 2018-05-20 RX ADMIN — Medication 0.25 MILLIGRAM(S): at 12:28

## 2018-05-20 RX ADMIN — BUDESONIDE AND FORMOTEROL FUMARATE DIHYDRATE 2 PUFF(S): 160; 4.5 AEROSOL RESPIRATORY (INHALATION) at 05:01

## 2018-05-20 RX ADMIN — Medication 100 MILLIGRAM(S): at 21:17

## 2018-05-20 RX ADMIN — CEFEPIME 100 MILLIGRAM(S): 1 INJECTION, POWDER, FOR SOLUTION INTRAMUSCULAR; INTRAVENOUS at 05:01

## 2018-05-20 RX ADMIN — Medication 100 MILLIGRAM(S): at 13:41

## 2018-05-20 RX ADMIN — Medication 250 MILLIGRAM(S): at 06:22

## 2018-05-20 RX ADMIN — TIOTROPIUM BROMIDE 1 CAPSULE(S): 18 CAPSULE ORAL; RESPIRATORY (INHALATION) at 12:28

## 2018-05-20 RX ADMIN — MONTELUKAST 10 MILLIGRAM(S): 4 TABLET, CHEWABLE ORAL at 12:28

## 2018-05-20 RX ADMIN — PANTOPRAZOLE SODIUM 40 MILLIGRAM(S): 20 TABLET, DELAYED RELEASE ORAL at 05:01

## 2018-05-20 RX ADMIN — ONDANSETRON 4 MILLIGRAM(S): 8 TABLET, FILM COATED ORAL at 19:55

## 2018-05-20 RX ADMIN — Medication 150 MILLIGRAM(S): at 05:01

## 2018-05-20 RX ADMIN — Medication 166.67 MILLIGRAM(S): at 20:14

## 2018-05-20 RX ADMIN — CEFEPIME 100 MILLIGRAM(S): 1 INJECTION, POWDER, FOR SOLUTION INTRAMUSCULAR; INTRAVENOUS at 21:17

## 2018-05-20 RX ADMIN — Medication 3: at 12:03

## 2018-05-20 RX ADMIN — Medication 3: at 16:51

## 2018-05-20 RX ADMIN — Medication 1: at 21:17

## 2018-05-20 RX ADMIN — Medication 100 MILLIGRAM(S): at 05:02

## 2018-05-20 RX ADMIN — APIXABAN 5 MILLIGRAM(S): 2.5 TABLET, FILM COATED ORAL at 18:23

## 2018-05-20 NOTE — PROGRESS NOTE ADULT - PROBLEM SELECTOR PLAN 2
-anorectal SCC dx in 2016, s/p 2 cycles chemotherapy/radiation (last 1 month ago)  -pt. follows with Dr. King, consulted house [facility]

## 2018-05-20 NOTE — PROGRESS NOTE ADULT - PROBLEM SELECTOR PLAN 1
-febrile, tachycardic, WBC of 5, ANC 3200, s/p vancomycin and cefepime   -possibly 2/2 PNA in the setting of productive cough, CTAP to r/o GI source in the setting of anorectal SCC, RVP/CT head/CXR negative, has pelvic/knee replacements (joints benign on PE), mediport site non-concerning  -f/u CTAP, BCx, UCx, c/w vancomycin, cefepime, flagyl for anaerobes (unable to tx with zosyn due to PCN allergy), stress dose steroids in the setting of adrenal insufficiency

## 2018-05-20 NOTE — PROGRESS NOTE ADULT - PROBLEM SELECTOR PLAN 7
-soft BPs now on stress dose steroids as above  -will hold home Diovan 40mg in the setting of sepsis and resume as tolerated  -monitor blood pressures

## 2018-05-20 NOTE — PROGRESS NOTE ADULT - ATTENDING COMMENTS
Patient clinically feeling much better, sitting up and bathing herself today.   Continues to have productive cough.  Will also check CT as CXR was negative.   Continue with stress dose steroids for today, can wean down starting tomorrow.

## 2018-05-20 NOTE — PROGRESS NOTE ADULT - SUBJECTIVE AND OBJECTIVE BOX
Patient is a 67y old  Female who presents with a chief complaint of fever (19 May 2018 16:23)    SUBJECTIVE / OVERNIGHT EVENTS: Pt. feels much better this morning. Febrile overnight, denies cp, sob, abdominal apin, n/v/d.     MEDICATIONS  (STANDING):  apixaban 5 milliGRAM(s) Oral every 12 hours  buDESOnide  80 MICROgram(s)/formoterol 4.5 MICROgram(s) Inhaler 2 Puff(s) Inhalation two times a day  cefepime   IVPB 2000 milliGRAM(s) IV Intermittent every 8 hours  dextrose 5%. 1000 milliLiter(s) (50 mL/Hr) IV Continuous <Continuous>  dextrose 50% Injectable 12.5 Gram(s) IV Push once  dextrose 50% Injectable 25 Gram(s) IV Push once  dextrose 50% Injectable 25 Gram(s) IV Push once  digoxin     Tablet 0.25 milliGRAM(s) Oral daily  hydrocortisone sodium succinate Injectable 100 milliGRAM(s) IV Push every 8 hours  insulin glargine Injectable (LANTUS) 12 Unit(s) SubCutaneous at bedtime  insulin lispro (HumaLOG) corrective regimen sliding scale   SubCutaneous three times a day before meals  insulin lispro (HumaLOG) corrective regimen sliding scale   SubCutaneous at bedtime  metroNIDAZOLE  IVPB      metroNIDAZOLE  IVPB 500 milliGRAM(s) IV Intermittent every 8 hours  montelukast 10 milliGRAM(s) Oral daily  pantoprazole    Tablet 40 milliGRAM(s) Oral before breakfast  pregabalin 150 milliGRAM(s) Oral two times a day  tiotropium 18 MICROgram(s) Capsule 1 Capsule(s) Inhalation daily  vancomycin  IVPB 1000 milliGRAM(s) IV Intermittent every 12 hours    MEDICATIONS  (PRN):  ALBUTerol    90 MICROgram(s) HFA Inhaler 1 Puff(s) Inhalation every 4 hours PRN Shortness of Breath and/or Wheezing  dextrose 40% Gel 15 Gram(s) Oral once PRN Blood Glucose LESS THAN 70 milliGRAM(s)/deciliter  glucagon  Injectable 1 milliGRAM(s) IntraMuscular once PRN Glucose LESS THAN 70 milligrams/deciliter  loratadine 10 milliGRAM(s) Oral daily PRN allergies    Vital Signs Last 24 Hrs  T(C): 36.3 (20 May 2018 04:35), Max: 39.6 (19 May 2018 11:10)  T(F): 97.4 (20 May 2018 04:35), Max: 103.2 (19 May 2018 11:10)  HR: 58 (20 May 2018 04:35) (58 - 109)  BP: 101/60 (20 May 2018 04:35) (94/59 - 114/57)  RR: 18 (20 May 2018 04:35) (18 - 18)  SpO2: 96% (20 May 2018 04:35) (92% - 98%)  CAPILLARY BLOOD GLUCOSE      POCT Blood Glucose.: 215 mg/dL (20 May 2018 07:46)  POCT Blood Glucose.: 175 mg/dL (19 May 2018 21:11)  POCT Blood Glucose.: 91 mg/dL (19 May 2018 17:32)    I&O's Summary    19 May 2018 07:01  -  20 May 2018 07:00  --------------------------------------------------------  IN: 800 mL / OUT: 1100 mL / NET: -300 mL    PHYSICAL EXAM:  GENERAL: NAD, well-developed  HEAD:  Atraumatic, Normocephalic  EYES: EOMI, PERRLA, conjunctiva and sclera clear, right eye erythema resolved   NECK: Supple, No JVD  CHEST/LUNG: Clear to auscultation bilaterally; No wheeze, right chest wall port   HEART: Regular rate and rhythm; No murmurs, rubs, or gallops  ABDOMEN: Soft, Nontender, Nondistended; Bowel sounds present  EXTREMITIES:  2+ Peripheral Pulses, No clubbing, cyanosis, or edema  PSYCH: AAOx3  NEUROLOGY: non-focal  SKIN: No rashes or lesions    LABS:                        13.6   5.0   )-----------( 156      ( 19 May 2018 12:00 )             43.4     05-    135  |  98  |  8   ----------------------------<  124<H>  4.1   |  25  |  0.91    Ca    8.5      19 May 2018 12:00  Mg     2.1     -    TPro  7.1  /  Alb  3.6  /  TBili  0.2  /  DBili  x   /  AST  22  /  ALT  16  /  AlkPhos  98  05-19    PT/INR - ( 19 May 2018 12:00 )   PT: 15.6 sec;   INR: 1.43 ratio         PTT - ( 19 May 2018 12:00 )  PTT:42.1 sec  CARDIAC MARKERS ( 19 May 2018 12:00 )  x     / <0.01 ng/mL / x     / x     / x          Urinalysis Basic - ( 19 May 2018 12:22 )    Color: Yellow / Appearance: Clear / S.017 / pH: x  Gluc: x / Ketone: Trace  / Bili: Negative / Urobili: Negative   Blood: x / Protein: 30 mg/dL / Nitrite: Negative   Leuk Esterase: Negative / RBC: 5-10 /HPF / WBC 0-2 /HPF   Sq Epi: x / Non Sq Epi: OCC /HPF / Bacteria: x    RADIOLOGY & ADDITIONAL TESTS:    Imaging Personally Reviewed: yes     Consultant(s) Notes Reviewed: yes

## 2018-05-20 NOTE — PROGRESS NOTE ADULT - PROBLEM SELECTOR PLAN 8
DVT ppx: therapeutically anticoagulated with Eliquis  Diet: Carbohydrate restricted  Dispo: Pending PT evaluation    Elena Ribeiro MD  PGY-1 I Internal Medicine  Pager [phone]

## 2018-05-20 NOTE — PROGRESS NOTE ADULT - PROBLEM SELECTOR PLAN 4
-unclear etiology  -stress dose steroids in the setting of sepsis, pantoprazole for GI ppx 40mg daily

## 2018-05-21 ENCOUNTER — TRANSCRIPTION ENCOUNTER (OUTPATIENT)
Age: 68
End: 2018-05-21

## 2018-05-21 LAB
ANION GAP SERPL CALC-SCNC: 16 MMOL/L — SIGNIFICANT CHANGE UP (ref 5–17)
BASOPHILS # BLD AUTO: 0 K/UL — SIGNIFICANT CHANGE UP (ref 0–0.2)
BASOPHILS NFR BLD AUTO: 0.5 % — SIGNIFICANT CHANGE UP (ref 0–2)
BUN SERPL-MCNC: 12 MG/DL — SIGNIFICANT CHANGE UP (ref 7–23)
CALCIUM SERPL-MCNC: 9.1 MG/DL — SIGNIFICANT CHANGE UP (ref 8.4–10.5)
CHLORIDE SERPL-SCNC: 102 MMOL/L — SIGNIFICANT CHANGE UP (ref 96–108)
CO2 SERPL-SCNC: 21 MMOL/L — LOW (ref 22–31)
CREAT SERPL-MCNC: 0.71 MG/DL — SIGNIFICANT CHANGE UP (ref 0.5–1.3)
EOSINOPHIL # BLD AUTO: 0 K/UL — SIGNIFICANT CHANGE UP (ref 0–0.5)
EOSINOPHIL NFR BLD AUTO: 0.1 % — SIGNIFICANT CHANGE UP (ref 0–6)
GLUCOSE BLDC GLUCOMTR-MCNC: 206 MG/DL — HIGH (ref 70–99)
GLUCOSE BLDC GLUCOMTR-MCNC: 207 MG/DL — HIGH (ref 70–99)
GLUCOSE BLDC GLUCOMTR-MCNC: 253 MG/DL — HIGH (ref 70–99)
GLUCOSE BLDC GLUCOMTR-MCNC: 266 MG/DL — HIGH (ref 70–99)
GLUCOSE SERPL-MCNC: 293 MG/DL — HIGH (ref 70–99)
HCT VFR BLD CALC: 41.4 % — SIGNIFICANT CHANGE UP (ref 34.5–45)
HGB BLD-MCNC: 12.9 G/DL — SIGNIFICANT CHANGE UP (ref 11.5–15.5)
LYMPHOCYTES # BLD AUTO: 0.6 K/UL — LOW (ref 1–3.3)
LYMPHOCYTES # BLD AUTO: 11.8 % — LOW (ref 13–44)
MAGNESIUM SERPL-MCNC: 2.2 MG/DL — SIGNIFICANT CHANGE UP (ref 1.6–2.6)
MCHC RBC-ENTMCNC: 23.8 PG — LOW (ref 27–34)
MCHC RBC-ENTMCNC: 31 GM/DL — LOW (ref 32–36)
MCV RBC AUTO: 76.7 FL — LOW (ref 80–100)
MONOCYTES # BLD AUTO: 0.3 K/UL — SIGNIFICANT CHANGE UP (ref 0–0.9)
MONOCYTES NFR BLD AUTO: 6 % — SIGNIFICANT CHANGE UP (ref 2–14)
NEUTROPHILS # BLD AUTO: 4.3 K/UL — SIGNIFICANT CHANGE UP (ref 1.8–7.4)
NEUTROPHILS NFR BLD AUTO: 81.7 % — HIGH (ref 43–77)
PHOSPHATE SERPL-MCNC: 1.8 MG/DL — LOW (ref 2.5–4.5)
PLAT MORPH BLD: NORMAL — SIGNIFICANT CHANGE UP
PLATELET # BLD AUTO: 160 K/UL — SIGNIFICANT CHANGE UP (ref 150–400)
POTASSIUM SERPL-MCNC: 4.6 MMOL/L — SIGNIFICANT CHANGE UP (ref 3.5–5.3)
POTASSIUM SERPL-SCNC: 4.6 MMOL/L — SIGNIFICANT CHANGE UP (ref 3.5–5.3)
RBC # BLD: 5.4 M/UL — HIGH (ref 3.8–5.2)
RBC # FLD: 15.3 % — HIGH (ref 10.3–14.5)
RBC BLD AUTO: SIGNIFICANT CHANGE UP
SODIUM SERPL-SCNC: 139 MMOL/L — SIGNIFICANT CHANGE UP (ref 135–145)
WBC # BLD: 5.3 K/UL — SIGNIFICANT CHANGE UP (ref 3.8–10.5)
WBC # FLD AUTO: 5.3 K/UL — SIGNIFICANT CHANGE UP (ref 3.8–10.5)

## 2018-05-21 PROCEDURE — 99233 SBSQ HOSP IP/OBS HIGH 50: CPT | Mod: GC

## 2018-05-21 PROCEDURE — 99222 1ST HOSP IP/OBS MODERATE 55: CPT

## 2018-05-21 RX ORDER — HYDROCORTISONE 20 MG
50 TABLET ORAL EVERY 8 HOURS
Qty: 0 | Refills: 0 | Status: COMPLETED | OUTPATIENT
Start: 2018-05-21 | End: 2018-05-21

## 2018-05-21 RX ORDER — HYDROCORTISONE 20 MG
25 TABLET ORAL EVERY 8 HOURS
Qty: 0 | Refills: 0 | Status: DISCONTINUED | OUTPATIENT
Start: 2018-05-22 | End: 2018-05-22

## 2018-05-21 RX ORDER — SODIUM,POTASSIUM PHOSPHATES 278-250MG
1 POWDER IN PACKET (EA) ORAL
Qty: 0 | Refills: 0 | Status: COMPLETED | OUTPATIENT
Start: 2018-05-21 | End: 2018-05-22

## 2018-05-21 RX ADMIN — Medication 150 MILLIGRAM(S): at 16:58

## 2018-05-21 RX ADMIN — Medication 100 MILLIGRAM(S): at 05:03

## 2018-05-21 RX ADMIN — APIXABAN 5 MILLIGRAM(S): 2.5 TABLET, FILM COATED ORAL at 16:58

## 2018-05-21 RX ADMIN — Medication 166.67 MILLIGRAM(S): at 05:03

## 2018-05-21 RX ADMIN — Medication 3: at 12:38

## 2018-05-21 RX ADMIN — MONTELUKAST 10 MILLIGRAM(S): 4 TABLET, CHEWABLE ORAL at 12:38

## 2018-05-21 RX ADMIN — Medication 1 TABLET(S): at 16:59

## 2018-05-21 RX ADMIN — Medication 50 MILLIGRAM(S): at 22:13

## 2018-05-21 RX ADMIN — APIXABAN 5 MILLIGRAM(S): 2.5 TABLET, FILM COATED ORAL at 05:03

## 2018-05-21 RX ADMIN — INSULIN GLARGINE 12 UNIT(S): 100 INJECTION, SOLUTION SUBCUTANEOUS at 22:15

## 2018-05-21 RX ADMIN — Medication 50 MILLIGRAM(S): at 13:24

## 2018-05-21 RX ADMIN — BUDESONIDE AND FORMOTEROL FUMARATE DIHYDRATE 2 PUFF(S): 160; 4.5 AEROSOL RESPIRATORY (INHALATION) at 05:04

## 2018-05-21 RX ADMIN — CEFEPIME 100 MILLIGRAM(S): 1 INJECTION, POWDER, FOR SOLUTION INTRAMUSCULAR; INTRAVENOUS at 05:02

## 2018-05-21 RX ADMIN — TIOTROPIUM BROMIDE 1 CAPSULE(S): 18 CAPSULE ORAL; RESPIRATORY (INHALATION) at 13:24

## 2018-05-21 RX ADMIN — CEFEPIME 100 MILLIGRAM(S): 1 INJECTION, POWDER, FOR SOLUTION INTRAMUSCULAR; INTRAVENOUS at 13:24

## 2018-05-21 RX ADMIN — CEFEPIME 100 MILLIGRAM(S): 1 INJECTION, POWDER, FOR SOLUTION INTRAMUSCULAR; INTRAVENOUS at 22:18

## 2018-05-21 RX ADMIN — Medication 150 MILLIGRAM(S): at 05:02

## 2018-05-21 RX ADMIN — BUDESONIDE AND FORMOTEROL FUMARATE DIHYDRATE 2 PUFF(S): 160; 4.5 AEROSOL RESPIRATORY (INHALATION) at 16:59

## 2018-05-21 RX ADMIN — Medication 2: at 16:58

## 2018-05-21 RX ADMIN — PANTOPRAZOLE SODIUM 40 MILLIGRAM(S): 20 TABLET, DELAYED RELEASE ORAL at 05:03

## 2018-05-21 RX ADMIN — Medication 1 TABLET(S): at 13:24

## 2018-05-21 RX ADMIN — Medication 0.25 MILLIGRAM(S): at 08:01

## 2018-05-21 RX ADMIN — Medication 3: at 08:00

## 2018-05-21 RX ADMIN — Medication 1 TABLET(S): at 22:15

## 2018-05-21 NOTE — PROGRESS NOTE ADULT - PROBLEM SELECTOR PLAN 1
-febrile, tachycardic, WBC of 5, ANC 3200  -possibly 2/2 PNA in the setting of productive cough, CTAP negative for infection, RVP/CT head/CXR negative, has pelvic/knee replacements (joints benign on PE), mediport site non-concerning  -BCx NTD, UCx negative, c/w vancomycin, cefepime, flagyl for anaerobes (unable to tx with zosyn due to PCN allergy), stress dose steroids in the setting of adrenal insufficiency -on admission febrile, tachycardic, WBC of 5, ANC 3200  -possibly 2/2 PNA in the setting of productive cough, CTAP negative for infection, RVP/CT head/CXR negative, has pelvic/knee replacements (joints benign on PE), mediport site non-concerning  -BCx NTD, UCx negative, d/c vancomycin and flagyl, c/w cefepime day 3/5, tapering stress dose steroids in the setting of adrenal insufficiency

## 2018-05-21 NOTE — PHYSICAL THERAPY INITIAL EVALUATION ADULT - PLANNED THERAPY INTERVENTIONS, PT EVAL
GOAL: Pt will perform 18 stairs with or without U HR as needed within 3-4weeks./bed mobility training

## 2018-05-21 NOTE — PROGRESS NOTE ADULT - SUBJECTIVE AND OBJECTIVE BOX
Patient is a 67y old  Female who presents with a chief complaint of fever (19 May 2018 16:23)    SUBJECTIVE / OVERNIGHT EVENTS: No acute events overnight. Pt. feeling well, no fevers, chills, cp, sob, n/v/d.     MEDICATIONS  (STANDING):  apixaban 5 milliGRAM(s) Oral every 12 hours  buDESOnide  80 MICROgram(s)/formoterol 4.5 MICROgram(s) Inhaler 2 Puff(s) Inhalation two times a day  cefepime   IVPB 2000 milliGRAM(s) IV Intermittent every 8 hours  dextrose 5%. 1000 milliLiter(s) (50 mL/Hr) IV Continuous <Continuous>  dextrose 50% Injectable 12.5 Gram(s) IV Push once  dextrose 50% Injectable 25 Gram(s) IV Push once  dextrose 50% Injectable 25 Gram(s) IV Push once  digoxin     Tablet 0.25 milliGRAM(s) Oral daily  hydrocortisone sodium succinate Injectable 100 milliGRAM(s) IV Push every 8 hours  insulin glargine Injectable (LANTUS) 12 Unit(s) SubCutaneous at bedtime  insulin lispro (HumaLOG) corrective regimen sliding scale   SubCutaneous three times a day before meals  insulin lispro (HumaLOG) corrective regimen sliding scale   SubCutaneous at bedtime  metroNIDAZOLE  IVPB      metroNIDAZOLE  IVPB 500 milliGRAM(s) IV Intermittent every 8 hours  montelukast 10 milliGRAM(s) Oral daily  pantoprazole    Tablet 40 milliGRAM(s) Oral before breakfast  pregabalin 150 milliGRAM(s) Oral two times a day  tiotropium 18 MICROgram(s) Capsule 1 Capsule(s) Inhalation daily  vancomycin  IVPB 1250 milliGRAM(s) IV Intermittent every 12 hours    MEDICATIONS  (PRN):  ALBUTerol    90 MICROgram(s) HFA Inhaler 1 Puff(s) Inhalation every 4 hours PRN Shortness of Breath and/or Wheezing  dextrose 40% Gel 15 Gram(s) Oral once PRN Blood Glucose LESS THAN 70 milliGRAM(s)/deciliter  glucagon  Injectable 1 milliGRAM(s) IntraMuscular once PRN Glucose LESS THAN 70 milligrams/deciliter  loratadine 10 milliGRAM(s) Oral daily PRN allergies    Vital Signs Last 24 Hrs  T(C): 36.7 (21 May 2018 04:39), Max: 37 (20 May 2018 20:06)  T(F): 98.1 (21 May 2018 04:39), Max: 98.6 (20 May 2018 20:06)  HR: 59 (21 May 2018 04:39) (59 - 77)  BP: 146/64 (21 May 2018 04:39) (101/56 - 146/64)  RR: 18 (21 May 2018 04:39) (18 - 18)  SpO2: 98% (21 May 2018 04:39) (95% - 98%)  CAPILLARY BLOOD GLUCOSE    POCT Blood Glucose.: 266 mg/dL (21 May 2018 07:41)  POCT Blood Glucose.: 275 mg/dL (20 May 2018 21:06)  POCT Blood Glucose.: 261 mg/dL (20 May 2018 16:30)  POCT Blood Glucose.: 264 mg/dL (20 May 2018 11:53)    I&O's Summary    20 May 2018 07:01  -  21 May 2018 07:00  --------------------------------------------------------  IN: 1890 mL / OUT: 1100 mL / NET: 790 mL    PHYSICAL EXAM:  GENERAL: NAD, well-developed  HEAD:  Atraumatic, Normocephalic  EYES: EOMI, PERRLA, conjunctiva and sclera clear, right eye erythema resolved   NECK: Supple, No JVD  CHEST/LUNG: Clear to auscultation bilaterally; No wheeze, right chest wall port   HEART: Regular rate and rhythm; No murmurs, rubs, or gallops  ABDOMEN: Soft, Nontender, Nondistended; Bowel sounds present  EXTREMITIES:  2+ Peripheral Pulses, No clubbing, cyanosis, or edema  PSYCH: AAOx3  NEUROLOGY: non-focal  SKIN: No rashes or lesions    LABS:                        12.8   3.12  )-----------( 138      ( 20 May 2018 11:31 )             39.7     05-20    138  |  104  |  10  ----------------------------<  290<H>  4.7   |  23  |  0.65    Ca    8.9      20 May 2018 10:13  Phos  3.0     05-20  Mg     2.2     05-20    TPro  7.1  /  Alb  3.6  /  TBili  0.2  /  DBili  x   /  AST  22  /  ALT  16  /  AlkPhos  98  05-19    PT/INR - ( 19 May 2018 12:00 )   PT: 15.6 sec;   INR: 1.43 ratio         PTT - ( 19 May 2018 12:00 )  PTT:42.1 sec  CARDIAC MARKERS ( 19 May 2018 12:00 )  x     / <0.01 ng/mL / x     / x     / x          Urinalysis Basic - ( 19 May 2018 12:22 )    Color: Yellow / Appearance: Clear / S.017 / pH: x  Gluc: x / Ketone: Trace  / Bili: Negative / Urobili: Negative   Blood: x / Protein: 30 mg/dL / Nitrite: Negative   Leuk Esterase: Negative / RBC: 5-10 /HPF / WBC 0-2 /HPF   Sq Epi: x / Non Sq Epi: OCC /HPF / Bacteria: x        RADIOLOGY & ADDITIONAL TESTS:    Imaging Personally Reviewed: yes    Consultant(s) Notes Reviewed: yes    Care Discussed with Consultants/Other Providers: yes

## 2018-05-21 NOTE — PHYSICAL THERAPY INITIAL EVALUATION ADULT - GENERAL OBSERVATIONS, REHAB EVAL
Pt received seated at [facility] w/ [facility]

## 2018-05-21 NOTE — CONSULT NOTE ADULT - SUBJECTIVE AND OBJECTIVE BOX
66 y/o woman with hx of afib on Eliquis, asthma, bilateral knee replacement, adrenal insuf, DM, diagnosed with anal cancer in 2017, squamous cell. A PET scan did not reveal any local or metastatic disease. She received Mitomycin on 5/3/2017 (dose #2 mitomycin on 6/1/2017) and Xeloda. Radiation 5/3/17 to 6/16/17 at 5400 cGy. On 4/17/2018 patient had local relapse in anus proved by biopsy. It is SCC and HPV / p16 is positive. Patient is deemed not a surgical candidate for APR due to pulmonary and cardiac risk.  Patient received radiation hyperfractionated for 10 days. She completed on 4/11/2018. She took Xeloda concurrent with it. Last chemo 1 month ago. She is admitted with fever, sepsis, possibly  related to bronchitis.    Patient History:    Past Medical History:  Adrenal insufficiency    Aortic disease  leaky valve  Aortic insufficiency  mod/severe AI on echo 9/25/17  Asthma    Atrial fibrillation    Colorectal cancer  7/2017- last treatment , chemo and radiation  COPD (chronic obstructive pulmonary disease)    Diabetes  type two. insulin dependent  Pelvic fracture    Rectal bleeding    Seizure  x 1 1/7/18  Tracheobronchomalacia.     Past Surgical History:  Exostosis of orbit, left  left eye prosthetic  H/O pelvic surgery    H/O total knee replacement, bilateral    History of partial hysterectomy    History of sinus surgery.     Family History:  Family history of asthma  Family history of diabetes mellitus type II\    INTERVAL HPI/OVERNIGHT EVENTS:  Patient S&E at bedside. No o/n events,     VITAL SIGNS:  T(F): 99.4 (05-21-18 @ 13:39)  HR: 73 (05-21-18 @ 13:39)  BP: 151/70 (05-21-18 @ 13:39)  RR: 18 (05-21-18 @ 13:39)  SpO2: 97% (05-21-18 @ 13:39)  Wt(kg): --    PHYSICAL EXAM:    Constitutional: NAD  Eyes: EOMI, sclera non-icteric  Neck: supple, no masses, no JVD  Respiratory: CTA b/l, good air entry b/l  Cardiovascular: RRR, no M/R/G  Gastrointestinal: soft, NTND, no masses palpable, + BS, no hepatosplenomegaly  Extremities: no c/c/e  Neurological: AAOx3      MEDICATIONS  (STANDING):  apixaban 5 milliGRAM(s) Oral every 12 hours  buDESOnide  80 MICROgram(s)/formoterol 4.5 MICROgram(s) Inhaler 2 Puff(s) Inhalation two times a day  cefepime   IVPB 2000 milliGRAM(s) IV Intermittent every 8 hours  dextrose 5%. 1000 milliLiter(s) (50 mL/Hr) IV Continuous <Continuous>  dextrose 50% Injectable 12.5 Gram(s) IV Push once  dextrose 50% Injectable 25 Gram(s) IV Push once  dextrose 50% Injectable 25 Gram(s) IV Push once  digoxin     Tablet 0.25 milliGRAM(s) Oral daily  hydrocortisone sodium succinate Injectable 50 milliGRAM(s) IV Push every 8 hours  insulin glargine Injectable (LANTUS) 12 Unit(s) SubCutaneous at bedtime  insulin lispro (HumaLOG) corrective regimen sliding scale   SubCutaneous three times a day before meals  insulin lispro (HumaLOG) corrective regimen sliding scale   SubCutaneous at bedtime  montelukast 10 milliGRAM(s) Oral daily  pantoprazole    Tablet 40 milliGRAM(s) Oral before breakfast  potassium acid phosphate/sodium acid phosphate tablet (K-PHOS No. 2) 1 Tablet(s) Oral four times a day with meals  pregabalin 150 milliGRAM(s) Oral two times a day  tiotropium 18 MICROgram(s) Capsule 1 Capsule(s) Inhalation daily    MEDICATIONS  (PRN):  ALBUTerol    90 MICROgram(s) HFA Inhaler 1 Puff(s) Inhalation every 4 hours PRN Shortness of Breath and/or Wheezing  dextrose 40% Gel 15 Gram(s) Oral once PRN Blood Glucose LESS THAN 70 milliGRAM(s)/deciliter  glucagon  Injectable 1 milliGRAM(s) IntraMuscular once PRN Glucose LESS THAN 70 milligrams/deciliter  loratadine 10 milliGRAM(s) Oral daily PRN allergies      Allergies    ampicillin (Short breath)  aspirin (Short breath)  Avelox (Short breath)  codeine (Short breath)  Dilaudid (Short breath)  iodine (Short breath)  penicillin (Short breath)  shellfish (Anaphylaxis)  tetanus toxoid (Short breath)  Valium (Short breath)    Intolerances        LABS:                        12.9   5.3   )-----------( 160      ( 21 May 2018 11:15 )             41.4     05-21    139  |  102  |  12  ----------------------------<  293<H>  4.6   |  21<L>  |  0.71    Ca    9.1      21 May 2018 10:42  Phos  1.8     05-21  Mg     2.2     05-21          Chest X ray 5/19/19 no infiltrate

## 2018-05-21 NOTE — PROGRESS NOTE ADULT - PROBLEM SELECTOR PLAN 4
-unclear etiology  -stress dose steroids in the setting of sepsis, pantoprazole for GI ppx 40mg daily -unclear etiology  -tapering stress dose steroids in the setting of sepsis, resume home regimen 5/23 pantoprazole for GI ppx 40mg daily

## 2018-05-21 NOTE — DISCHARGE NOTE ADULT - OTHER SIGNIFICANT FINDINGS
Addendum: Admitted with concern for sepsis, possibly 2/2 chronic bronchitis, PNA ruled out on CT chest.

## 2018-05-21 NOTE — DISCHARGE NOTE ADULT - PLAN OF CARE
follow up Infectious work up was negative, however you were treated with a course of antibiotics as you were immunosuppressed and your symptoms were consistent with PNA. Please follow up with your doctor after discharge. Please follow up with Dr. King for further treatment recommendations. Infectious work up was negative, however you were treated with a course of antibiotics as you were immunosuppressed and you had a productive cough so we covered you for pneumonia. Please follow up with your doctor after discharge. Unfortunately we could not do the MRI inpatient as the 3 Dasha machine was not available. Please schedule MRI as an outpatient. Please follow up with Dr. King for further treatment recommendations. finish taking zyprexa You were treated with high dose steroids for sepsis as you have adrenal insufficiency that lead to some mental status changes. You improved as we resumed your home steroid regimen. Psychiatry evaluated you and started you on zyprexa. Please take this medication for three more days and stop, and follow up with psychiatry after discharge. You were treated with high dose steroids for sepsis as you have adrenal insufficiency that lead to some mental status changes. You improved as we resumed your home steroid regimen. Psychiatry evaluated you and started you on zyprexa. Please take this medication for three more days and stop, and follow up with psychiatry after discharge. Please also make an appointment with neurology.

## 2018-05-21 NOTE — CONSULT NOTE ADULT - ASSESSMENT
66 y/o woman with multiple medical co-morbidities, also with hx of anorectal cancer on chemotherapy, admitted with fever, sepsis and coughing, chest X ray is clear. She has a productive cough.    Plan: 1.fever: continue broad spectrum Abx (vanco/cefepime), patient feels much better.  2. ano-rectal cancer: obtain MRI of the pelvis with and without contrast.

## 2018-05-21 NOTE — PROGRESS NOTE ADULT - PROBLEM SELECTOR PLAN 2
-anorectal SCC dx in 2016, s/p 2 cycles chemotherapy/radiation (last 1 month ago)  -pt. follows with Dr. King, consulted house [facility] -anorectal SCC dx in 2016, s/p 2 cycles chemotherapy/radiation (last 1 month ago)  -f/u MR abdomen  -oncology recs appreciated

## 2018-05-21 NOTE — DISCHARGE NOTE ADULT - PROVIDER TOKENS
TOKEN:'[id number]',TOKEN:'[id number]' TOKEN:'[id number]',TOKEN:'[id number]',TOKEN:'[id number]' TOKEN:'[id number]',TOKEN:'[id number]',TOKEN:'[id number]',FREE:[LAST:[Neurology Clinic],PHONE:[phone]],FAX:[(   )    -]]

## 2018-05-21 NOTE — DISCHARGE NOTE ADULT - CARE PROVIDER_API CALL
Zach King), Hematology; Internal Medicine; Medical Oncology  [address] [place] [address]  Phone: [phone]  Fax: [phone]    Eulalio Allison), Internal Medicine; Pulmonary Disease  [address] [address]  Phone: [phone]  Fax: [phone] Zach King), Hematology; Internal Medicine; Medical Oncology  [address] [place] [address]  Phone: [phone]  Fax: [phone]    Eulalio Allison), Internal Medicine; Pulmonary Disease  [address] [facility] [address]  [place] [address]  Phone: [phone]  Fax: [phone]    Aly Clemens), Psychiatry  [address] [place] [address]  Phone: [phone]  Fax: [phone] Zach King), Hematology; Internal Medicine; Medical Oncology  [address] [place] [address]  Phone: [phone]  Fax: [phone]    Eulalio Allison), Internal Medicine; Pulmonary Disease  [address] [facility] [address]  [place] [address]  Phone: [phone]  Fax: [phone]    Aly Clemens), Psychiatry  [address] [place] [address]  Phone: [phone]  Fax: [phone]    Neurology Clinic,   Phone: [phone]  Fax: (   )    -

## 2018-05-21 NOTE — PHYSICAL THERAPY INITIAL EVALUATION ADULT - ADDITIONAL COMMENTS
As per pt, pt is from [place] but has been living w/ her sister in a private house w/ 18 steps to enter and UHR. Pt was independent w/ all ADLs and mobility PTA. Pt states that her sister is able to assist as needed.

## 2018-05-21 NOTE — PROGRESS NOTE ADULT - PROBLEM SELECTOR PLAN 8
DVT ppx: therapeutically anticoagulated with Eliquis  Diet: Carbohydrate restricted  Dispo: Pending PT evaluation    Elena Ribeiro MD  PGY-1 I Internal Medicine  Pager [phone]

## 2018-05-21 NOTE — DISCHARGE NOTE ADULT - CARE PROVIDERS DIRECT ADDRESSES
,[email or web address],DirectAddress_Unknown ,[email or web address],DirectAddress_Unknown,DirectAddress_Unknown

## 2018-05-21 NOTE — DISCHARGE NOTE ADULT - CARE PLAN
Principal Discharge DX:	Fever  Goal:	follow up  Assessment and plan of treatment:	Infectious work up was negative, however you were treated with a course of antibiotics as you were immunosuppressed and your symptoms were consistent with PNA. Please follow up with your doctor after discharge.  Secondary Diagnosis:	Anal cancer  Goal:	follow up  Assessment and plan of treatment:	Please follow up with Dr. King for further treatment recommendations. Principal Discharge DX:	Fever  Goal:	follow up  Assessment and plan of treatment:	Infectious work up was negative, however you were treated with a course of antibiotics as you were immunosuppressed and you had a productive cough so we covered you for pneumonia. Please follow up with your doctor after discharge.  Secondary Diagnosis:	Anal cancer  Goal:	follow up  Assessment and plan of treatment:	Unfortunately we could not do the MRI inpatient as the 3 Dasha machine was not available. Please schedule MRI as an outpatient. Please follow up with Dr. King for further treatment recommendations.  Secondary Diagnosis:	Psychosis  Goal:	finish taking zyprexa  Assessment and plan of treatment:	You were treated with high dose steroids for sepsis as you have adrenal insufficiency that lead to some mental status changes. You improved as we resumed your home steroid regimen. Psychiatry evaluated you and started you on zyprexa. Please take this medication for three more days and stop, and follow up with psychiatry after discharge. Principal Discharge DX:	Fever  Goal:	follow up  Assessment and plan of treatment:	Infectious work up was negative, however you were treated with a course of antibiotics as you were immunosuppressed and you had a productive cough so we covered you for pneumonia. Please follow up with your doctor after discharge.  Secondary Diagnosis:	Anal cancer  Goal:	follow up  Assessment and plan of treatment:	Unfortunately we could not do the MRI inpatient as the 3 Dahsa machine was not available. Please schedule MRI as an outpatient. Please follow up with Dr. King for further treatment recommendations.  Secondary Diagnosis:	Psychosis  Goal:	finish taking zyprexa  Assessment and plan of treatment:	You were treated with high dose steroids for sepsis as you have adrenal insufficiency that lead to some mental status changes. You improved as we resumed your home steroid regimen. Psychiatry evaluated you and started you on zyprexa. Please take this medication for three more days and stop, and follow up with psychiatry after discharge. Please also make an appointment with neurology.

## 2018-05-21 NOTE — DISCHARGE NOTE ADULT - PATIENT PORTAL LINK FT
You can access the [email or web address] Patient Portal, offered by [facility], by registering with the following website: [email or web address]

## 2018-05-21 NOTE — PROGRESS NOTE ADULT - ATTENDING COMMENTS
CT A/P unrevealing, CT chest shows bud in tree opacities but no focal consolidations.  Given symptoms and clinical presentation, will treat for presumed pneumonia.  Patient is clinically much improved. Will taper down steroids back to home dose.  D/C vanc and flagyl. Continue cefepime.  Patient states that she has "been in a fog" for the past few days, and that she had difficulty walking with PT. On neuro exam, strength is 5/5 throughout and no focal deficits noted. She is AAOX3 today. Likely from metabolic encephalopathy in the setting of infection. Discussed with patient.

## 2018-05-21 NOTE — DISCHARGE NOTE ADULT - MEDICATION SUMMARY - MEDICATIONS TO TAKE
I will START or STAY ON the medications listed below when I get home from the hospital:    rolling walker  -- 1 unit(s) between cheek and gums once a day   -- Indication: For Ambulation    methylPREDNISolone 4 mg oral tablet  -- 1 tab(s) by mouth once a day  -- Indication: For Adrenal insufficiency    Diovan 40 mg oral tablet  -- 1 tab(s) by mouth once a day  -- Indication: For HTN (hypertension)    digoxin 250 mcg (0.25 mg) oral tablet  -- 1 tab(s) by mouth once a day  -- Indication: For Atrial Fibrillation    apixaban 5 mg oral tablet  -- 1 tab(s) by mouth every 12 hours  -- Indication: For Atrial fibrillation    pregabalin 150 mg oral capsule  -- 1 cap(s) by mouth 2 times a day  -- Indication: For Pain    Lantus Solostar Pen 100 units/mL subcutaneous solution  -- 10 to 22 unit(s) subcutaneous once a day (at bedtime)  -- Do not drink alcoholic beverages when taking this medication.  It is very important that you take or use this exactly as directed.  Do not skip doses or discontinue unless directed by your doctor.  Keep in refrigerator.  Do not freeze.    -- Indication: For Diabetes    Victoza 18 mg/3 mL subcutaneous solution  -- 1.8 milligram(s) subcutaneous once a day (in the morning)  -- Indication: For Diabetes    NovoLOG 100 units/mL subcutaneous solution  -- about 7 unit(s) subcutaneous 3 times a day  -- Indication: For Diabetes    Xyzal 5 mg oral tablet  -- 1 tab(s) by mouth once a day (in the evening), As Needed  -- Indication: For Asthma    OLANZapine 2.5 mg oral tablet  -- 1 tab(s) by mouth once a day (at bedtime), please stop taking after three days   -- Indication: For Steroid Induced Psychosis    Spiriva 18 mcg inhalation capsule  -- 1 cap(s) inhaled once a day  -- Indication: For Asthma    Breo Ellipta 200 mcg-25 mcg/inh inhalation powder  -- 1 puff(s) inhaled once a day  -- Indication: For Asthma    albuterol CFC free 90 mcg/inh inhalation aerosol  -- 1 puff(s) inhaled every 4 hours, As Needed - for shortness of breath and/or wheezing  -- Indication: For Asthma    Xolair 150 mg subcutaneous injection  -- subcutaneous every 2 weeks  -- Indication: For Asthma    Singulair 10 mg oral tablet  -- 1 tab(s) by mouth once a day  -- Indication: For Asthma    pantoprazole 40 mg oral delayed release tablet  -- 1 tab(s) by mouth once a day (before a meal)  -- Indication: For GI prophylaxis while on steroids
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
## 2018-05-22 ENCOUNTER — APPOINTMENT (OUTPATIENT)
Dept: MRI IMAGING | Facility: IMAGING CENTER | Age: 68
End: 2018-05-22

## 2018-05-22 DIAGNOSIS — I77.9 DISORDER OF ARTERIES AND ARTERIOLES, UNSPECIFIED: ICD-10-CM

## 2018-05-22 DIAGNOSIS — J39.8 OTHER SPECIFIED DISEASES OF UPPER RESPIRATORY TRACT: ICD-10-CM

## 2018-05-22 DIAGNOSIS — Z29.9 ENCOUNTER FOR PROPHYLACTIC MEASURES, UNSPECIFIED: ICD-10-CM

## 2018-05-22 DIAGNOSIS — R50.9 FEVER, UNSPECIFIED: ICD-10-CM

## 2018-05-22 DIAGNOSIS — C19 MALIGNANT NEOPLASM OF RECTOSIGMOID JUNCTION: ICD-10-CM

## 2018-05-22 LAB
ALBUMIN SERPL ELPH-MCNC: 3.7 G/DL — SIGNIFICANT CHANGE UP (ref 3.3–5)
ALP SERPL-CCNC: 82 U/L — SIGNIFICANT CHANGE UP (ref 40–120)
ALT FLD-CCNC: 26 U/L — SIGNIFICANT CHANGE UP (ref 10–45)
ANION GAP SERPL CALC-SCNC: 12 MMOL/L — SIGNIFICANT CHANGE UP (ref 5–17)
ANION GAP SERPL CALC-SCNC: 13 MMOL/L — SIGNIFICANT CHANGE UP (ref 5–17)
APPEARANCE UR: CLEAR — SIGNIFICANT CHANGE UP
AST SERPL-CCNC: 35 U/L — SIGNIFICANT CHANGE UP (ref 10–40)
BASE EXCESS BLDV CALC-SCNC: 3.9 MMOL/L — HIGH (ref -2–2)
BASOPHILS # BLD AUTO: 0 K/UL — SIGNIFICANT CHANGE UP (ref 0–0.2)
BILIRUB SERPL-MCNC: 0.2 MG/DL — SIGNIFICANT CHANGE UP (ref 0.2–1.2)
BILIRUB UR-MCNC: NEGATIVE — SIGNIFICANT CHANGE UP
BUN SERPL-MCNC: 16 MG/DL — SIGNIFICANT CHANGE UP (ref 7–23)
BUN SERPL-MCNC: 17 MG/DL — SIGNIFICANT CHANGE UP (ref 7–23)
CA-I SERPL-SCNC: 1.12 MMOL/L — SIGNIFICANT CHANGE UP (ref 1.12–1.3)
CALCIUM SERPL-MCNC: 8.8 MG/DL — SIGNIFICANT CHANGE UP (ref 8.4–10.5)
CALCIUM SERPL-MCNC: 8.8 MG/DL — SIGNIFICANT CHANGE UP (ref 8.4–10.5)
CHLORIDE BLDV-SCNC: 110 MMOL/L — HIGH (ref 96–108)
CHLORIDE SERPL-SCNC: 102 MMOL/L — SIGNIFICANT CHANGE UP (ref 96–108)
CHLORIDE SERPL-SCNC: 103 MMOL/L — SIGNIFICANT CHANGE UP (ref 96–108)
CO2 BLDV-SCNC: 30 MMOL/L — SIGNIFICANT CHANGE UP (ref 22–30)
CO2 SERPL-SCNC: 25 MMOL/L — SIGNIFICANT CHANGE UP (ref 22–31)
CO2 SERPL-SCNC: 26 MMOL/L — SIGNIFICANT CHANGE UP (ref 22–31)
COLOR SPEC: YELLOW — SIGNIFICANT CHANGE UP
CREAT SERPL-MCNC: 0.61 MG/DL — SIGNIFICANT CHANGE UP (ref 0.5–1.3)
CREAT SERPL-MCNC: 0.7 MG/DL — SIGNIFICANT CHANGE UP (ref 0.5–1.3)
DIFF PNL FLD: ABNORMAL
EOSINOPHIL # BLD AUTO: 0 K/UL — SIGNIFICANT CHANGE UP (ref 0–0.5)
GAS PNL BLDV: 137 MMOL/L — SIGNIFICANT CHANGE UP (ref 136–145)
GAS PNL BLDV: SIGNIFICANT CHANGE UP
GAS PNL BLDV: SIGNIFICANT CHANGE UP
GLUCOSE BLDC GLUCOMTR-MCNC: 192 MG/DL — HIGH (ref 70–99)
GLUCOSE BLDC GLUCOMTR-MCNC: 207 MG/DL — HIGH (ref 70–99)
GLUCOSE BLDC GLUCOMTR-MCNC: 268 MG/DL — HIGH (ref 70–99)
GLUCOSE BLDC GLUCOMTR-MCNC: 269 MG/DL — HIGH (ref 70–99)
GLUCOSE BLDC GLUCOMTR-MCNC: 301 MG/DL — HIGH (ref 70–99)
GLUCOSE BLDV-MCNC: 286 MG/DL — HIGH (ref 70–99)
GLUCOSE SERPL-MCNC: 222 MG/DL — HIGH (ref 70–99)
GLUCOSE SERPL-MCNC: 282 MG/DL — HIGH (ref 70–99)
GLUCOSE UR QL: 500 MG/DL
HCO3 BLDV-SCNC: 28 MMOL/L — SIGNIFICANT CHANGE UP (ref 21–29)
HCT VFR BLD CALC: 34.5 % — SIGNIFICANT CHANGE UP (ref 34.5–45)
HCT VFR BLD CALC: 38.6 % — SIGNIFICANT CHANGE UP (ref 34.5–45)
HCT VFR BLDA CALC: 38 % — LOW (ref 39–50)
HGB BLD CALC-MCNC: 12.2 G/DL — SIGNIFICANT CHANGE UP (ref 11.5–15.5)
HGB BLD-MCNC: 11.3 G/DL — LOW (ref 11.5–15.5)
HGB BLD-MCNC: 12.5 G/DL — SIGNIFICANT CHANGE UP (ref 11.5–15.5)
HYALINE CASTS # UR AUTO: ABNORMAL
KETONES UR-MCNC: NEGATIVE — SIGNIFICANT CHANGE UP
LACTATE BLDA-MCNC: 1.4 MMOL/L — SIGNIFICANT CHANGE UP (ref 0.7–2)
LACTATE BLDV-MCNC: 1.4 MMOL/L — SIGNIFICANT CHANGE UP (ref 0.7–2)
LEUKOCYTE ESTERASE UR-ACNC: NEGATIVE — SIGNIFICANT CHANGE UP
LYMPHOCYTES # BLD AUTO: 1.2 K/UL — SIGNIFICANT CHANGE UP (ref 1–3.3)
LYMPHOCYTES # BLD AUTO: 19 % — SIGNIFICANT CHANGE UP (ref 13–44)
MAGNESIUM SERPL-MCNC: 2.1 MG/DL — SIGNIFICANT CHANGE UP (ref 1.6–2.6)
MAGNESIUM SERPL-MCNC: 2.1 MG/DL — SIGNIFICANT CHANGE UP (ref 1.6–2.6)
MCHC RBC-ENTMCNC: 23.6 PG — LOW (ref 27–34)
MCHC RBC-ENTMCNC: 24.6 PG — LOW (ref 27–34)
MCHC RBC-ENTMCNC: 32.5 GM/DL — SIGNIFICANT CHANGE UP (ref 32–36)
MCHC RBC-ENTMCNC: 32.8 GM/DL — SIGNIFICANT CHANGE UP (ref 32–36)
MCV RBC AUTO: 72 FL — LOW (ref 80–100)
MCV RBC AUTO: 75.8 FL — LOW (ref 80–100)
METAMYELOCYTES # FLD: 1 % — HIGH (ref 0–0)
MONOCYTES # BLD AUTO: 0.8 K/UL — SIGNIFICANT CHANGE UP (ref 0–0.9)
MONOCYTES NFR BLD AUTO: 11 % — SIGNIFICANT CHANGE UP (ref 2–14)
MYELOCYTES NFR BLD: 3 % — HIGH (ref 0–0)
NEUTROPHILS # BLD AUTO: 2.5 K/UL — SIGNIFICANT CHANGE UP (ref 1.8–7.4)
NEUTROPHILS NFR BLD AUTO: 62 % — SIGNIFICANT CHANGE UP (ref 43–77)
NEUTS BAND # BLD: 3 % — SIGNIFICANT CHANGE UP (ref 0–8)
NITRITE UR-MCNC: NEGATIVE — SIGNIFICANT CHANGE UP
OTHER CELLS CSF MANUAL: 10 ML/DL — LOW (ref 18–22)
PCO2 BLDV: 43 MMHG — SIGNIFICANT CHANGE UP (ref 35–50)
PH BLDV: 7.43 — SIGNIFICANT CHANGE UP (ref 7.35–7.45)
PH UR: 5.5 — SIGNIFICANT CHANGE UP (ref 5–8)
PHOSPHATE SERPL-MCNC: 2 MG/DL — LOW (ref 2.5–4.5)
PHOSPHATE SERPL-MCNC: 2.5 MG/DL — SIGNIFICANT CHANGE UP (ref 2.5–4.5)
PLAT MORPH BLD: NORMAL — SIGNIFICANT CHANGE UP
PLATELET # BLD AUTO: 178 K/UL — SIGNIFICANT CHANGE UP (ref 150–400)
PLATELET # BLD AUTO: 194 K/UL — SIGNIFICANT CHANGE UP (ref 150–400)
PO2 BLDV: 33 MMHG — SIGNIFICANT CHANGE UP (ref 25–45)
POTASSIUM BLDV-SCNC: 3.5 MMOL/L — SIGNIFICANT CHANGE UP (ref 3.5–5)
POTASSIUM SERPL-MCNC: 3.7 MMOL/L — SIGNIFICANT CHANGE UP (ref 3.5–5.3)
POTASSIUM SERPL-MCNC: 3.7 MMOL/L — SIGNIFICANT CHANGE UP (ref 3.5–5.3)
POTASSIUM SERPL-SCNC: 3.7 MMOL/L — SIGNIFICANT CHANGE UP (ref 3.5–5.3)
POTASSIUM SERPL-SCNC: 3.7 MMOL/L — SIGNIFICANT CHANGE UP (ref 3.5–5.3)
PROT SERPL-MCNC: 7 G/DL — SIGNIFICANT CHANGE UP (ref 6–8.3)
PROT UR-MCNC: 100 MG/DL
RBC # BLD: 4.79 M/UL — SIGNIFICANT CHANGE UP (ref 3.8–5.2)
RBC # BLD: 5.09 M/UL — SIGNIFICANT CHANGE UP (ref 3.8–5.2)
RBC # FLD: 15 % — HIGH (ref 10.3–14.5)
RBC # FLD: 17.2 % — HIGH (ref 10.3–14.5)
RBC BLD AUTO: SIGNIFICANT CHANGE UP
RBC CASTS # UR COMP ASSIST: ABNORMAL /HPF (ref 0–2)
SAO2 % BLDV: 60 % — LOW (ref 67–88)
SODIUM SERPL-SCNC: 140 MMOL/L — SIGNIFICANT CHANGE UP (ref 135–145)
SODIUM SERPL-SCNC: 141 MMOL/L — SIGNIFICANT CHANGE UP (ref 135–145)
SP GR SPEC: 1.02 — SIGNIFICANT CHANGE UP (ref 1.01–1.02)
UROBILINOGEN FLD QL: NEGATIVE — SIGNIFICANT CHANGE UP
VARIANT LYMPHS # BLD: 1 % — SIGNIFICANT CHANGE UP (ref 0–6)
WBC # BLD: 4.5 K/UL — SIGNIFICANT CHANGE UP (ref 3.8–10.5)
WBC # BLD: 4.64 K/UL — SIGNIFICANT CHANGE UP (ref 3.8–10.5)
WBC # FLD AUTO: 4.5 K/UL — SIGNIFICANT CHANGE UP (ref 3.8–10.5)
WBC # FLD AUTO: 4.64 K/UL — SIGNIFICANT CHANGE UP (ref 3.8–10.5)
WBC UR QL: ABNORMAL /HPF (ref 0–5)

## 2018-05-22 PROCEDURE — 70450 CT HEAD/BRAIN W/O DYE: CPT | Mod: 26

## 2018-05-22 PROCEDURE — 99233 SBSQ HOSP IP/OBS HIGH 50: CPT | Mod: GC

## 2018-05-22 PROCEDURE — 99232 SBSQ HOSP IP/OBS MODERATE 35: CPT

## 2018-05-22 RX ORDER — HYDROCORTISONE 20 MG
25 TABLET ORAL EVERY 8 HOURS
Qty: 0 | Refills: 0 | Status: COMPLETED | OUTPATIENT
Start: 2018-05-22 | End: 2018-05-22

## 2018-05-22 RX ORDER — POTASSIUM PHOSPHATE, MONOBASIC POTASSIUM PHOSPHATE, DIBASIC 236; 224 MG/ML; MG/ML
15 INJECTION, SOLUTION INTRAVENOUS ONCE
Qty: 0 | Refills: 0 | Status: COMPLETED | OUTPATIENT
Start: 2018-05-22 | End: 2018-05-22

## 2018-05-22 RX ADMIN — Medication 3: at 12:04

## 2018-05-22 RX ADMIN — BUDESONIDE AND FORMOTEROL FUMARATE DIHYDRATE 2 PUFF(S): 160; 4.5 AEROSOL RESPIRATORY (INHALATION) at 05:43

## 2018-05-22 RX ADMIN — MONTELUKAST 10 MILLIGRAM(S): 4 TABLET, CHEWABLE ORAL at 12:04

## 2018-05-22 RX ADMIN — APIXABAN 5 MILLIGRAM(S): 2.5 TABLET, FILM COATED ORAL at 18:54

## 2018-05-22 RX ADMIN — APIXABAN 5 MILLIGRAM(S): 2.5 TABLET, FILM COATED ORAL at 05:43

## 2018-05-22 RX ADMIN — Medication 1 TABLET(S): at 08:19

## 2018-05-22 RX ADMIN — INSULIN GLARGINE 12 UNIT(S): 100 INJECTION, SOLUTION SUBCUTANEOUS at 23:19

## 2018-05-22 RX ADMIN — Medication 4: at 17:01

## 2018-05-22 RX ADMIN — CEFEPIME 100 MILLIGRAM(S): 1 INJECTION, POWDER, FOR SOLUTION INTRAMUSCULAR; INTRAVENOUS at 13:16

## 2018-05-22 RX ADMIN — Medication 25 MILLIGRAM(S): at 13:16

## 2018-05-22 RX ADMIN — Medication 150 MILLIGRAM(S): at 05:44

## 2018-05-22 RX ADMIN — CEFEPIME 100 MILLIGRAM(S): 1 INJECTION, POWDER, FOR SOLUTION INTRAMUSCULAR; INTRAVENOUS at 05:44

## 2018-05-22 RX ADMIN — PANTOPRAZOLE SODIUM 40 MILLIGRAM(S): 20 TABLET, DELAYED RELEASE ORAL at 05:43

## 2018-05-22 RX ADMIN — Medication 1: at 08:19

## 2018-05-22 RX ADMIN — Medication 150 MILLIGRAM(S): at 18:54

## 2018-05-22 RX ADMIN — TIOTROPIUM BROMIDE 1 CAPSULE(S): 18 CAPSULE ORAL; RESPIRATORY (INHALATION) at 12:04

## 2018-05-22 RX ADMIN — Medication 0.25 MILLIGRAM(S): at 05:43

## 2018-05-22 RX ADMIN — BUDESONIDE AND FORMOTEROL FUMARATE DIHYDRATE 2 PUFF(S): 160; 4.5 AEROSOL RESPIRATORY (INHALATION) at 18:54

## 2018-05-22 RX ADMIN — POTASSIUM PHOSPHATE, MONOBASIC POTASSIUM PHOSPHATE, DIBASIC 62.5 MILLIMOLE(S): 236; 224 INJECTION, SOLUTION INTRAVENOUS at 23:08

## 2018-05-22 RX ADMIN — Medication 25 MILLIGRAM(S): at 05:44

## 2018-05-22 RX ADMIN — Medication 25 MILLIGRAM(S): at 22:52

## 2018-05-22 NOTE — PROGRESS NOTE ADULT - ATTENDING COMMENTS
Patient feeling well today, was ambulating around the unit with walker.   Breathing comfortably.   Patient concerned about source of infection. Blood cultures are negative, UA and UC are negative. Given presenting symptoms of productive cough bringing up thick green sputum, likely PNA. Now clinically improved. Fevers have resolved. Patient is not hypoxic. No cough.   Patient also reports confusion during the admission, likely due to metabolic encephalopathy. On exam, no focal deficits. Patient is AAOX3 today and answering all questions appropriately.  Patient states that she would like neurology eval for memory loss and confusion during admission, can follow up as an outpatient.  Pending MRI hip to be done tonight.   Finishing 5 days of abx therapy tomorrow. Continue to titrate down stress steroids.

## 2018-05-22 NOTE — PROGRESS NOTE ADULT - SUBJECTIVE AND OBJECTIVE BOX
***************************************************************  Antony Mcfarlane, PGY2  Internal Medicine   spectra:[id number]  ***************************************************************    RAYRAY RODRIGUEZ  67y  MRN: [id number]    Patient is a 67y old  Female who presents with a chief complaint of fever (21 May 2018 16:56)      Subjective: no events ON. Denies fever, CP, SOB, abn pain, N/V, dysuria. Tolerating diet.  No change in strength or sensation.     MEDICATIONS  (STANDING):  apixaban 5 milliGRAM(s) Oral every 12 hours  buDESOnide  80 MICROgram(s)/formoterol 4.5 MICROgram(s) Inhaler 2 Puff(s) Inhalation two times a day  cefepime   IVPB 2000 milliGRAM(s) IV Intermittent every 8 hours  dextrose 5%. 1000 milliLiter(s) (50 mL/Hr) IV Continuous <Continuous>  dextrose 50% Injectable 12.5 Gram(s) IV Push once  dextrose 50% Injectable 25 Gram(s) IV Push once  dextrose 50% Injectable 25 Gram(s) IV Push once  digoxin     Tablet 0.25 milliGRAM(s) Oral daily  hydrocortisone sodium succinate Injectable 25 milliGRAM(s) IV Push every 8 hours  insulin glargine Injectable (LANTUS) 12 Unit(s) SubCutaneous at bedtime  insulin lispro (HumaLOG) corrective regimen sliding scale   SubCutaneous three times a day before meals  insulin lispro (HumaLOG) corrective regimen sliding scale   SubCutaneous at bedtime  montelukast 10 milliGRAM(s) Oral daily  pantoprazole    Tablet 40 milliGRAM(s) Oral before breakfast  pregabalin 150 milliGRAM(s) Oral two times a day  tiotropium 18 MICROgram(s) Capsule 1 Capsule(s) Inhalation daily    MEDICATIONS  (PRN):  ALBUTerol    90 MICROgram(s) HFA Inhaler 1 Puff(s) Inhalation every 4 hours PRN Shortness of Breath and/or Wheezing  dextrose 40% Gel 15 Gram(s) Oral once PRN Blood Glucose LESS THAN 70 milliGRAM(s)/deciliter  glucagon  Injectable 1 milliGRAM(s) IntraMuscular once PRN Glucose LESS THAN 70 milligrams/deciliter  loratadine 10 milliGRAM(s) Oral daily PRN allergies      Objective:    Vitals: Vital Signs Last 24 Hrs  T(C): 36.4 (05-22-18 @ 11:34), Max: 37.4 (05-21-18 @ 13:39)  T(F): 97.5 (05-22-18 @ 11:34), Max: 99.4 (05-21-18 @ 13:39)  HR: 53 (05-22-18 @ 11:34) (53 - 79)  BP: 147/71 (05-22-18 @ 11:34) (147/71 - 158/82)  BP(mean): --  RR: 18 (05-22-18 @ 11:34) (18 - 18)  SpO2: 97% (05-22-18 @ 11:34) (97% - 99%)            I&O's Summary    21 May 2018 07:01  -  22 May 2018 07:00  --------------------------------------------------------  IN: 890 mL / OUT: 702 mL / NET: 188 mL    22 May 2018 07:01  -  22 May 2018 12:40  --------------------------------------------------------  IN: 360 mL / OUT: 300 mL / NET: 60 mL        Physical Exam:  GENERAL: NAD, well-developed  HEAD:  Atraumatic, Normocephalic  EYES: EOMI, PERRLA, conjunctiva and sclera clear, right eye erythema resolved   NECK: Supple, No JVD  CHEST/LUNG: Clear to auscultation bilaterally; No wheeze, right chest wall port   HEART: Regular rate and rhythm; No murmurs, rubs, or gallops  ABDOMEN: Soft, Nontender, Nondistended; Bowel sounds present  EXTREMITIES:  2+ Peripheral Pulses, No clubbing, cyanosis, or edema  PSYCH: AAOx3  NEUROLOGY: non-focal  SKIN: No rashes or lesions      LABS:  05-22    140  |  103  |  16  ----------------------------<  222<H>  3.7   |  25  |  0.70  05-21    139  |  102  |  12  ----------------------------<  293<H>  4.6   |  21<L>  |  0.71  05-20    138  |  104  |  10  ----------------------------<  290<H>  4.7   |  23  |  0.65    Ca    8.8      22 May 2018 06:13  Ca    9.1      21 May 2018 10:42  Ca    8.9      20 May 2018 10:13  Phos  2.5     05-22  Mg     2.1     05-22                          11.3   4.64  )-----------( 178      ( 22 May 2018 07:50 )             34.5                         12.9   5.3   )-----------( 160      ( 21 May 2018 11:15 )             41.4                         12.8   3.12  )-----------( 138      ( 20 May 2018 11:31 )             39.7           POCT Blood Glucose.: 269 mg/dL (22 May 2018 11:57)  POCT Blood Glucose.: 192 mg/dL (22 May 2018 07:32)  POCT Blood Glucose.: 206 mg/dL (21 May 2018 21:02)  POCT Blood Glucose.: 207 mg/dL (21 May 2018 16:23)      RADIOLOGY & ADDITIONAL TESTS:    Imaging Personally Reviewed:  [x ] YES  [ ] NO    Consultants involved in case:   Consultant(s) Notes Reviewed:  [ x] YES  [ ] NO:   Care Discussed with Consultants/Other Providers [x ] YES  [ ] NO ***************************************************************  Antony Mcfarlane, PGY2  Internal Medicine   ***************************************************************    RAYRAY RODRIGUEZ  67y  MRN: [id number]    Patient is a 67y old  Female who presents with a chief complaint of fever (21 May 2018 16:56)      Subjective: no events ON. Denies fever, CP, SOB, abn pain, N/V, dysuria. Tolerating diet.  No change in strength or sensation.     MEDICATIONS  (STANDING):  apixaban 5 milliGRAM(s) Oral every 12 hours  buDESOnide  80 MICROgram(s)/formoterol 4.5 MICROgram(s) Inhaler 2 Puff(s) Inhalation two times a day  cefepime   IVPB 2000 milliGRAM(s) IV Intermittent every 8 hours  dextrose 5%. 1000 milliLiter(s) (50 mL/Hr) IV Continuous <Continuous>  dextrose 50% Injectable 12.5 Gram(s) IV Push once  dextrose 50% Injectable 25 Gram(s) IV Push once  dextrose 50% Injectable 25 Gram(s) IV Push once  digoxin     Tablet 0.25 milliGRAM(s) Oral daily  hydrocortisone sodium succinate Injectable 25 milliGRAM(s) IV Push every 8 hours  insulin glargine Injectable (LANTUS) 12 Unit(s) SubCutaneous at bedtime  insulin lispro (HumaLOG) corrective regimen sliding scale   SubCutaneous three times a day before meals  insulin lispro (HumaLOG) corrective regimen sliding scale   SubCutaneous at bedtime  montelukast 10 milliGRAM(s) Oral daily  pantoprazole    Tablet 40 milliGRAM(s) Oral before breakfast  pregabalin 150 milliGRAM(s) Oral two times a day  tiotropium 18 MICROgram(s) Capsule 1 Capsule(s) Inhalation daily    MEDICATIONS  (PRN):  ALBUTerol    90 MICROgram(s) HFA Inhaler 1 Puff(s) Inhalation every 4 hours PRN Shortness of Breath and/or Wheezing  dextrose 40% Gel 15 Gram(s) Oral once PRN Blood Glucose LESS THAN 70 milliGRAM(s)/deciliter  glucagon  Injectable 1 milliGRAM(s) IntraMuscular once PRN Glucose LESS THAN 70 milligrams/deciliter  loratadine 10 milliGRAM(s) Oral daily PRN allergies      Objective:    Vitals: Vital Signs Last 24 Hrs  T(C): 36.4 (05-22-18 @ 11:34), Max: 37.4 (05-21-18 @ 13:39)  T(F): 97.5 (05-22-18 @ 11:34), Max: 99.4 (05-21-18 @ 13:39)  HR: 53 (05-22-18 @ 11:34) (53 - 79)  BP: 147/71 (05-22-18 @ 11:34) (147/71 - 158/82)  BP(mean): --  RR: 18 (05-22-18 @ 11:34) (18 - 18)  SpO2: 97% (05-22-18 @ 11:34) (97% - 99%)            I&O's Summary    21 May 2018 07:01  -  22 May 2018 07:00  --------------------------------------------------------  IN: 890 mL / OUT: 702 mL / NET: 188 mL    22 May 2018 07:01  -  22 May 2018 12:40  --------------------------------------------------------  IN: 360 mL / OUT: 300 mL / NET: 60 mL        Physical Exam:  GENERAL: NAD, well-developed  HEAD:  Atraumatic, Normocephalic  EYES: EOMI, PERRLA, conjunctiva and sclera clear, right eye erythema resolved   NECK: Supple, No JVD  CHEST/LUNG: Clear to auscultation bilaterally; No wheeze, right chest wall port   HEART: Regular rate and rhythm; No murmurs, rubs, or gallops  ABDOMEN: Soft, Nontender, Nondistended; Bowel sounds present  EXTREMITIES:  2+ Peripheral Pulses, No clubbing, cyanosis, or edema  PSYCH: AAOx3  NEUROLOGY: non-focal  SKIN: No rashes or lesions      LABS:  05-22    140  |  103  |  16  ----------------------------<  222<H>  3.7   |  25  |  0.70  05-21    139  |  102  |  12  ----------------------------<  293<H>  4.6   |  21<L>  |  0.71  05-20    138  |  104  |  10  ----------------------------<  290<H>  4.7   |  23  |  0.65    Ca    8.8      22 May 2018 06:13  Ca    9.1      21 May 2018 10:42  Ca    8.9      20 May 2018 10:13  Phos  2.5     05-22  Mg     2.1     05-22                          11.3   4.64  )-----------( 178      ( 22 May 2018 07:50 )             34.5                         12.9   5.3   )-----------( 160      ( 21 May 2018 11:15 )             41.4                         12.8   3.12  )-----------( 138      ( 20 May 2018 11:31 )             39.7           POCT Blood Glucose.: 269 mg/dL (22 May 2018 11:57)  POCT Blood Glucose.: 192 mg/dL (22 May 2018 07:32)  POCT Blood Glucose.: 206 mg/dL (21 May 2018 21:02)  POCT Blood Glucose.: 207 mg/dL (21 May 2018 16:23)      RADIOLOGY & ADDITIONAL TESTS:    Imaging Personally Reviewed:  [x ] YES  [ ] NO    Consultants involved in case:   Consultant(s) Notes Reviewed:  [ x] YES  [ ] NO:   Care Discussed with Consultants/Other Providers [x ] YES  [ ] NO ***************************************************************  Antony Mcfarlane, PGY2  Internal Medicine   ***************************************************************    RAYRAY RODRIGUEZ  67y  MRN: [id number]    Patient is a 67y old  Female who presents with a chief complaint of fever (21 May 2018 16:56)    Subjective: no events ON. Denies fever, CP, SOB, abn pain, N/V, dysuria. Tolerating diet.  No change in strength or sensation.     MEDICATIONS  (STANDING):  apixaban 5 milliGRAM(s) Oral every 12 hours  buDESOnide  80 MICROgram(s)/formoterol 4.5 MICROgram(s) Inhaler 2 Puff(s) Inhalation two times a day  cefepime   IVPB 2000 milliGRAM(s) IV Intermittent every 8 hours  dextrose 5%. 1000 milliLiter(s) (50 mL/Hr) IV Continuous <Continuous>  dextrose 50% Injectable 12.5 Gram(s) IV Push once  dextrose 50% Injectable 25 Gram(s) IV Push once  dextrose 50% Injectable 25 Gram(s) IV Push once  digoxin     Tablet 0.25 milliGRAM(s) Oral daily  hydrocortisone sodium succinate Injectable 25 milliGRAM(s) IV Push every 8 hours  insulin glargine Injectable (LANTUS) 12 Unit(s) SubCutaneous at bedtime  insulin lispro (HumaLOG) corrective regimen sliding scale   SubCutaneous three times a day before meals  insulin lispro (HumaLOG) corrective regimen sliding scale   SubCutaneous at bedtime  montelukast 10 milliGRAM(s) Oral daily  pantoprazole    Tablet 40 milliGRAM(s) Oral before breakfast  pregabalin 150 milliGRAM(s) Oral two times a day  tiotropium 18 MICROgram(s) Capsule 1 Capsule(s) Inhalation daily    MEDICATIONS  (PRN):  ALBUTerol    90 MICROgram(s) HFA Inhaler 1 Puff(s) Inhalation every 4 hours PRN Shortness of Breath and/or Wheezing  dextrose 40% Gel 15 Gram(s) Oral once PRN Blood Glucose LESS THAN 70 milliGRAM(s)/deciliter  glucagon  Injectable 1 milliGRAM(s) IntraMuscular once PRN Glucose LESS THAN 70 milligrams/deciliter  loratadine 10 milliGRAM(s) Oral daily PRN allergies      Objective:    Vitals: Vital Signs Last 24 Hrs  T(C): 36.4 (05-22-18 @ 11:34), Max: 37.4 (05-21-18 @ 13:39)  T(F): 97.5 (05-22-18 @ 11:34), Max: 99.4 (05-21-18 @ 13:39)  HR: 53 (05-22-18 @ 11:34) (53 - 79)  BP: 147/71 (05-22-18 @ 11:34) (147/71 - 158/82)  BP(mean): --  RR: 18 (05-22-18 @ 11:34) (18 - 18)  SpO2: 97% (05-22-18 @ 11:34) (97% - 99%)            I&O's Summary    21 May 2018 07:01  -  22 May 2018 07:00  --------------------------------------------------------  IN: 890 mL / OUT: 702 mL / NET: 188 mL    22 May 2018 07:01  -  22 May 2018 12:40  --------------------------------------------------------  IN: 360 mL / OUT: 300 mL / NET: 60 mL        Physical Exam:  GENERAL: NAD, well-developed  HEAD:  Atraumatic, Normocephalic  EYES: EOMI, PERRLA, conjunctiva and sclera clear, right eye erythema resolved   NECK: Supple, No JVD  CHEST/LUNG: Clear to auscultation bilaterally; No wheeze, right chest wall port   HEART: Regular rate and rhythm; No murmurs, rubs, or gallops  ABDOMEN: Soft, Nontender, Nondistended; Bowel sounds present  EXTREMITIES:  2+ Peripheral Pulses, No clubbing, cyanosis, or edema  PSYCH: AAOx3  NEUROLOGY: non-focal  SKIN: No rashes or lesions      LABS:  05-22    140  |  103  |  16  ----------------------------<  222<H>  3.7   |  25  |  0.70  05-21    139  |  102  |  12  ----------------------------<  293<H>  4.6   |  21<L>  |  0.71  05-20    138  |  104  |  10  ----------------------------<  290<H>  4.7   |  23  |  0.65    Ca    8.8      22 May 2018 06:13  Ca    9.1      21 May 2018 10:42  Ca    8.9      20 May 2018 10:13  Phos  2.5     05-22  Mg     2.1     05-22                          11.3   4.64  )-----------( 178      ( 22 May 2018 07:50 )             34.5                         12.9   5.3   )-----------( 160      ( 21 May 2018 11:15 )             41.4                         12.8   3.12  )-----------( 138      ( 20 May 2018 11:31 )             39.7           POCT Blood Glucose.: 269 mg/dL (22 May 2018 11:57)  POCT Blood Glucose.: 192 mg/dL (22 May 2018 07:32)  POCT Blood Glucose.: 206 mg/dL (21 May 2018 21:02)  POCT Blood Glucose.: 207 mg/dL (21 May 2018 16:23)      RADIOLOGY & ADDITIONAL TESTS:    Imaging Personally Reviewed:  [x ] YES  [ ] NO    Consultants involved in case:   Consultant(s) Notes Reviewed:  [ x] YES  [ ] NO:   Care Discussed with Consultants/Other Providers [x ] YES  [ ] NO

## 2018-05-22 NOTE — PROGRESS NOTE ADULT - PROBLEM SELECTOR PLAN 8
DVT ppx: therapeutically anticoagulated with Eliquis  Diet: Carbohydrate restricted  Dispo: home with PT and rolling walker

## 2018-05-22 NOTE — PROGRESS NOTE ADULT - PROBLEM SELECTOR PLAN 2
controlled-  singulair 10mg qhs  symbicort 80 2 bid  spiriva 1 puff q day  pulmonary toilet-incentive spirometry, Chest Pt-acapella/chest vest-up to 5 times per day.    maintain oxygen levels above 90%-supplemental oxygen via nasal canula-humidified    All nebulized therapy is to be administered via hand held nebulizer-(patient must gargle and spit with water after use)

## 2018-05-22 NOTE — CHART NOTE - NSCHARTNOTEFT_GEN_A_CORE
Called to bedside by RN as patient was angry, agitated, and having paranoid delusions. Patient was AAOx4. FS was 268. VS were T 98.6, /77, HR 87, RR 18, SpO2 98% on RA.     Physical Exam:  General: NAD  HEENT: L eye non-reactive, EOMI on R, R eye reactive to light, MMM, oropharynx clear and nonerythematous  Cardiovascular: RRR, S1 and S2 present, no murmur  Respiratory: Breath sounds CTA b/l, no wheeze  Abdomen: Soft, NT, ND, + bowel sounds  Extremities: No clubbing, cyanosis, or edema  Vascular: Peripheral pulses 2+ b/l  Neuro: CNs 2-12 grossly intact, strength 5/5 in all muscle groups assessed, sensation intact to light touch throughout, no dysdiadochokinesia, tremor on R w/ finger-to-nose testing (L WNL), no gait disturbance  Psych: AAOx4, guarded affect  Skin: Warm and dry    UA showed no e/o UTI. CBC and VBG w/ lacate were WNL. CMP showed phos 2.0, phos was repleted. CTH was performed. Pt's daughter visited. Pt subsequently became more calm.    Archana Watt, PGY-1  PAGER [phone] Called to bedside by RN as patient was angry, agitated, and having paranoid delusions. Patient was AAOx4. FS was 268. VS were T 98.6, /77, HR 87, RR 18, SpO2 98% on RA.     Physical Exam:  General: NAD  HEENT: L eye non-reactive, EOMI on R, R eye reactive to light, MMM, oropharynx clear and nonerythematous  Cardiovascular: RRR, S1 and S2 present, no murmur  Respiratory: Breath sounds CTA b/l, no wheeze  Abdomen: Soft, NT, ND, + bowel sounds  Extremities: No clubbing, cyanosis, or edema  Vascular: Peripheral pulses 2+ b/l  Neuro: CNs 2-12 grossly intact, strength 5/5 in all muscle groups assessed, sensation intact to light touch throughout, no dysdiadochokinesia, tremor on R w/ finger-to-nose testing (L WNL), no gait disturbance  Psych: AAOx4, guarded affect  Skin: Warm and dry    UA showed no e/o UTI. CBC and VBG w/ lacate were WNL. CMP showed phos 2.0, phos was repleted. CTH was performed. Pt's daughter visited. Pt subsequently became more calm.    ADDENDUM:  No ICH on CTH.    Archana Watt, PGY-1  PAGER [phone] Called to bedside by RN as patient was angry, agitated, and having paranoid delusions. Patient was AAOx4. FS was 268. VS were T 98.6, /77, HR 87, RR 18, SpO2 98% on RA.     Physical Exam:  General: NAD  HEENT: L eye non-reactive, EOMI on R, R eye reactive to light, MMM, oropharynx clear and nonerythematous  Cardiovascular: RRR, S1 and S2 present, no murmur  Respiratory: Breath sounds CTA b/l, no wheeze  Abdomen: Soft, NT, ND, + bowel sounds  Extremities: No clubbing, cyanosis, or edema  Vascular: Peripheral pulses 2+ b/l  Neuro: CNs 2-12 grossly intact, strength 5/5 in all muscle groups assessed, sensation intact to light touch throughout, no dysdiadochokinesia, tremor on R w/ finger-to-nose testing (L WNL), no gait disturbance  Psych: AAOx4, guarded affect  Skin: Warm and dry    UA showed no e/o UTI. CBC and VBG w/ lacate were WNL. CMP showed phos 2.0, phos was repleted. CTH was performed. Pt's daughter visited. Pt subsequently became more calm.    ADDENDUM:  No ICH on CTH. Later on, pt was refusing to move to different room until daughter was present. When daughter came, pt was still refusing. Pt has since calmed down and is asleep in bed.    Archana Watt, PGY-1  PAGER [phone]

## 2018-05-22 NOTE — PROGRESS NOTE ADULT - ASSESSMENT
66 yo Asthma, TBM-s/p tracheoplasty, gerd, allergic rhinits, frequent infections and advanced colon cancer--admit w/  fevers.

## 2018-05-22 NOTE — PROGRESS NOTE ADULT - ATTENDING COMMENTS
as above--unclear etiology of temps--doubt PNA--clinically the best that she has been  continue inhaler regimen/acapella etc  ID evaluation to tailor abx                         Consider Sp and leonila evgriffin  Neuro evaln pre DC    Eulalio Allison MD-Pulmonary   [phone]

## 2018-05-22 NOTE — PROGRESS NOTE ADULT - PROBLEM SELECTOR PLAN 5
-saturating 95% on RA with productive cough  -c/w albuterol, spiriva, montelukast, breo not on formulary will do symbicort  -f/u pulm recs

## 2018-05-22 NOTE — PROGRESS NOTE ADULT - PROBLEM SELECTOR PLAN 7
-soft BPs now on stress dose steroids as above  -weaning off stress steroids, resume home BP meds as tolerated

## 2018-05-22 NOTE — PROGRESS NOTE ADULT - PROBLEM SELECTOR PLAN 4
-unclear etiology  -tapering stress dose steroids in the setting of sepsis, resume home regimen 5/23 pantoprazole for GI ppx 40mg daily

## 2018-05-22 NOTE — PROGRESS NOTE ADULT - PROBLEM SELECTOR PLAN 2
-anorectal SCC dx in 2016, s/p 2 cycles chemotherapy/radiation (last 1 month ago)  -f/u MR abdomen  -oncology recs appreciated

## 2018-05-22 NOTE — PROGRESS NOTE ADULT - SUBJECTIVE AND OBJECTIVE BOX
CHIEF COMPLAINT: elevated temps--re-admit to [facility]; breathing best its been    Interval Events: Heme onc evaluation    REVIEW OF SYSTEMS:  Constitutional: No fevers or chills. No weight loss. + fatigue or generalized malaise.  Eyes: No itching or discharge from the eyes  ENT: No ear pain. No ear discharge. No nasal congestion. No post nasal drip. No epistaxis. No throat pain. No sore throat. No difficulty swallowing.   CV: No chest pain. No palpitations. No lightheadedness or dizziness.   Resp: No dyspnea at rest. No dyspnea on exertion. No orthopnea. No wheezing. + cough. No stridor. + sputum production. No chest pain with respiration.  GI: No nausea. No vomiting. No diarrhea.  MSK: No joint pain or pain in any extremities  Integumentary: No skin lesions. No pedal edema.  Neurological: No gross motor weakness. No sensory changes.  [+ ] All other systems negative  [ ] Unable to assess ROS because ________    OBJECTIVE:  ICU Vital Signs Last 24 Hrs  T(C): 36.9 (22 May 2018 04:51), Max: 37.4 (21 May 2018 13:39)  T(F): 98.5 (22 May 2018 04:51), Max: 99.4 (21 May 2018 13:39)  HR: 62 (22 May 2018 04:51) (62 - 85)  BP: 149/65 (22 May 2018 04:51) (128/76 - 158/82)  BP(mean): --  ABP: --  ABP(mean): --  RR: 18 (22 May 2018 04:51) (18 - 18)  SpO2: 98% (22 May 2018 04:51) (95% - 99%)        05-20 @ 07:01  -  05-21 @ 07:00  --------------------------------------------------------  IN: 1890 mL / OUT: 1100 mL / NET: 790 mL    05-21 @ 07:01  -  05-22 @ 06:09  --------------------------------------------------------  IN: 840 mL / OUT: 702 mL / NET: 138 mL      CAPILLARY BLOOD GLUCOSE      POCT Blood Glucose.: 206 mg/dL (21 May 2018 21:02)      PHYSICAL EXAM: NAD in bed  General: Awake, alert, oriented X 3.   HEENT: Atraumatic, normocephalic.                 Mallampatti Grade 3                No nasal congestion.                No tonsillar or pharyngeal exudates.  Lymph Nodes: No palpable lymphadenopathy  Neck: No JVD. No carotid bruit.   Respiratory: Normal chest expansion                         Normal percussion                         Normal and equal air entry                         No wheeze, rhonchi or rales.  Cardiovascular: S1 S2 normal. No murmurs, rubs or gallops.   Abdomen: Soft, non-tender, non-distended. No organomegaly.  Extremities: Warm to touch. Peripheral pulse palpable. No pedal edema.   Skin: No rashes or skin lesions  Neurological: Motor and sensory examination equal and normal in all four extremities.  Psychiatry: Appropriate mood and affect.    HOSPITAL MEDICATIONS:  MEDICATIONS  (STANDING):  apixaban 5 milliGRAM(s) Oral every 12 hours  buDESOnide  80 MICROgram(s)/formoterol 4.5 MICROgram(s) Inhaler 2 Puff(s) Inhalation two times a day  cefepime   IVPB 2000 milliGRAM(s) IV Intermittent every 8 hours  dextrose 5%. 1000 milliLiter(s) (50 mL/Hr) IV Continuous <Continuous>  dextrose 50% Injectable 12.5 Gram(s) IV Push once  dextrose 50% Injectable 25 Gram(s) IV Push once  dextrose 50% Injectable 25 Gram(s) IV Push once  digoxin     Tablet 0.25 milliGRAM(s) Oral daily  hydrocortisone sodium succinate Injectable 25 milliGRAM(s) IV Push every 8 hours  insulin glargine Injectable (LANTUS) 12 Unit(s) SubCutaneous at bedtime  insulin lispro (HumaLOG) corrective regimen sliding scale   SubCutaneous three times a day before meals  insulin lispro (HumaLOG) corrective regimen sliding scale   SubCutaneous at bedtime  montelukast 10 milliGRAM(s) Oral daily  pantoprazole    Tablet 40 milliGRAM(s) Oral before breakfast  potassium acid phosphate/sodium acid phosphate tablet (K-PHOS No. 2) 1 Tablet(s) Oral four times a day with meals  pregabalin 150 milliGRAM(s) Oral two times a day  tiotropium 18 MICROgram(s) Capsule 1 Capsule(s) Inhalation daily    MEDICATIONS  (PRN):  ALBUTerol    90 MICROgram(s) HFA Inhaler 1 Puff(s) Inhalation every 4 hours PRN Shortness of Breath and/or Wheezing  dextrose 40% Gel 15 Gram(s) Oral once PRN Blood Glucose LESS THAN 70 milliGRAM(s)/deciliter  glucagon  Injectable 1 milliGRAM(s) IntraMuscular once PRN Glucose LESS THAN 70 milligrams/deciliter  loratadine 10 milliGRAM(s) Oral daily PRN allergies      LABS:                        12.9   5.3   )-----------( 160      ( 21 May 2018 11:15 )             41.4     05-21    139  |  102  |  12  ----------------------------<  293<H>  4.6   |  21<L>  |  0.71    Ca    9.1      21 May 2018 10:42  Phos  1.8     05-21  Mg     2.2     05-21              < from: CT Chest No Cont (05.20.18 @ 15:37) >  CHEST:     LUNGS AND LARGE AIRWAYS: Patent central airways. Right middle and lower   lobe tree-in-bud nodular opacities. Bilateral lower lobe subsegmental   atelectasis.  PLEURA: No pleural effusion.  VESSELS: Right chest wall Mediport terminating in the SVC. Atheromatous   disease of the aorta.   HEART: Heart size is normal. No pericardial effusion.  MEDIASTINUM AND MARANDA: No lymphadenopathy.  CHEST WALL AND LOWER NECK: Within normal limits.    ABDOMEN AND PELVIS:    LIVER: Within normal limits.  BILE DUCTS: Normal caliber.  GALLBLADDER: Within normal limits.  SPLEEN: Within normal limits.  PANCREAS: Stable exophytic pancreatic tail cyst, the largest of which   measures 2.3 x 1.4 cm.  ADRENALS: Within normal limits.  KIDNEYS/URETERS: 1.1 x 1.3 cm right interpolar hypodensity containing a   linear area of calcification. No hydroureteronephrosis.    BLADDER: Within normal limits.  REPRODUCTIVE ORGANS: No adnexal masses. Hysterectomy.    BOWEL: No bowel obstruction. Appendix not visualized.  PERITONEUM: No ascites.  VESSELS:  Atherosclerotic calcifications of the lower abdominal aorta and   bilateral iliac vessels.  RETROPERITONEUM: No lymphadenopathy.    ABDOMINAL WALL: Right lower abdominal wall subcutaneous stranding. Small   fat-containing umbilical hernia.  BONES: Right iliac fixation screw and pubic symphysis orthopedic hardware   noted. Mild degenerative changes of the visualized lower lumbar spine.  < from: CT Chest No Cont (05.20.18 @ 15:37) >    IMPRESSION:     Only mild right middle lobe and lower lobe tree-in-bud opacities.    No intra-abdominal abscess.      < end of copied text >    < end of copied text >    MICROBIOLOGY:     RADIOLOGY:    [ ] Reviewed and interpreted by me    Point of Care Ultrasound Findings:    PFT:    EKG:

## 2018-05-22 NOTE — PROGRESS NOTE ADULT - PROBLEM SELECTOR PLAN 1
-resolved  -on admission febrile, tachycardic, WBC of 5, ANC 3200  -possibly 2/2 PNA in the setting of productive cough, CTAP negative for infection, RVP/CT head/CXR negative, has pelvic/knee replacements (joints benign on PE), mediport site non-concerning  -BCx NTD, UCx negative, d/c vancomycin and flagyl, c/w cefepime day 4/5, tapering stress dose steroids in the setting of adrenal insufficiency

## 2018-05-23 ENCOUNTER — APPOINTMENT (OUTPATIENT)
Dept: PULMONOLOGY | Facility: CLINIC | Age: 68
End: 2018-05-23

## 2018-05-23 DIAGNOSIS — F29 UNSPECIFIED PSYCHOSIS NOT DUE TO A SUBSTANCE OR KNOWN PHYSIOLOGICAL CONDITION: ICD-10-CM

## 2018-05-23 DIAGNOSIS — F19.94 OTHER PSYCHOACTIVE SUBSTANCE USE, UNSPECIFIED WITH PSYCHOACTIVE SUBSTANCE-INDUCED MOOD DISORDER: ICD-10-CM

## 2018-05-23 LAB
GLUCOSE BLDC GLUCOMTR-MCNC: 205 MG/DL — HIGH (ref 70–99)
GLUCOSE BLDC GLUCOMTR-MCNC: 213 MG/DL — HIGH (ref 70–99)
GLUCOSE BLDC GLUCOMTR-MCNC: 271 MG/DL — HIGH (ref 70–99)
GLUCOSE BLDC GLUCOMTR-MCNC: 309 MG/DL — HIGH (ref 70–99)

## 2018-05-23 PROCEDURE — 99233 SBSQ HOSP IP/OBS HIGH 50: CPT | Mod: GC

## 2018-05-23 PROCEDURE — 93010 ELECTROCARDIOGRAM REPORT: CPT

## 2018-05-23 PROCEDURE — 99232 SBSQ HOSP IP/OBS MODERATE 35: CPT

## 2018-05-23 PROCEDURE — 99223 1ST HOSP IP/OBS HIGH 75: CPT

## 2018-05-23 RX ORDER — OLANZAPINE 15 MG/1
2.5 TABLET, FILM COATED ORAL AT BEDTIME
Qty: 0 | Refills: 0 | Status: DISCONTINUED | OUTPATIENT
Start: 2018-05-23 | End: 2018-05-24

## 2018-05-23 RX ORDER — HALOPERIDOL DECANOATE 100 MG/ML
1 INJECTION INTRAMUSCULAR EVERY 4 HOURS
Qty: 0 | Refills: 0 | Status: DISCONTINUED | OUTPATIENT
Start: 2018-05-23 | End: 2018-05-23

## 2018-05-23 RX ORDER — OLANZAPINE 15 MG/1
2.5 TABLET, FILM COATED ORAL ONCE
Qty: 0 | Refills: 0 | Status: COMPLETED | OUTPATIENT
Start: 2018-05-23 | End: 2018-05-23

## 2018-05-23 RX ORDER — HALOPERIDOL DECANOATE 100 MG/ML
1 INJECTION INTRAMUSCULAR EVERY 4 HOURS
Qty: 0 | Refills: 0 | Status: DISCONTINUED | OUTPATIENT
Start: 2018-05-23 | End: 2018-05-24

## 2018-05-23 RX ADMIN — CEFEPIME 100 MILLIGRAM(S): 1 INJECTION, POWDER, FOR SOLUTION INTRAMUSCULAR; INTRAVENOUS at 11:36

## 2018-05-23 RX ADMIN — OLANZAPINE 2.5 MILLIGRAM(S): 15 TABLET, FILM COATED ORAL at 20:45

## 2018-05-23 RX ADMIN — INSULIN GLARGINE 12 UNIT(S): 100 INJECTION, SOLUTION SUBCUTANEOUS at 21:28

## 2018-05-23 RX ADMIN — OLANZAPINE 2.5 MILLIGRAM(S): 15 TABLET, FILM COATED ORAL at 10:33

## 2018-05-23 RX ADMIN — Medication 150 MILLIGRAM(S): at 17:36

## 2018-05-23 RX ADMIN — Medication 2: at 08:51

## 2018-05-23 RX ADMIN — TIOTROPIUM BROMIDE 1 CAPSULE(S): 18 CAPSULE ORAL; RESPIRATORY (INHALATION) at 11:36

## 2018-05-23 RX ADMIN — APIXABAN 5 MILLIGRAM(S): 2.5 TABLET, FILM COATED ORAL at 23:02

## 2018-05-23 RX ADMIN — Medication 2: at 12:14

## 2018-05-23 RX ADMIN — CEFEPIME 100 MILLIGRAM(S): 1 INJECTION, POWDER, FOR SOLUTION INTRAMUSCULAR; INTRAVENOUS at 03:35

## 2018-05-23 RX ADMIN — Medication 3: at 16:52

## 2018-05-23 RX ADMIN — Medication 0.25 MILLIGRAM(S): at 11:36

## 2018-05-23 RX ADMIN — CEFEPIME 100 MILLIGRAM(S): 1 INJECTION, POWDER, FOR SOLUTION INTRAMUSCULAR; INTRAVENOUS at 20:45

## 2018-05-23 RX ADMIN — Medication 4 MILLIGRAM(S): at 11:36

## 2018-05-23 RX ADMIN — Medication 2: at 21:28

## 2018-05-23 RX ADMIN — APIXABAN 5 MILLIGRAM(S): 2.5 TABLET, FILM COATED ORAL at 11:36

## 2018-05-23 RX ADMIN — MONTELUKAST 10 MILLIGRAM(S): 4 TABLET, CHEWABLE ORAL at 11:36

## 2018-05-23 RX ADMIN — BUDESONIDE AND FORMOTEROL FUMARATE DIHYDRATE 2 PUFF(S): 160; 4.5 AEROSOL RESPIRATORY (INHALATION) at 17:36

## 2018-05-23 NOTE — PROGRESS NOTE ADULT - SUBJECTIVE AND OBJECTIVE BOX
Patient is a 67y old  Female who presents with a chief complaint of fever (21 May 2018 16:56)    SUBJECTIVE / OVERNIGHT EVENTS: Pt. wandered off to [place] yesterday, CT head negative. This morning she feels well, says she was only taking a walk. Anxious to get MRI done. Denies fevers, chills, cp, sob, n/v/d.     MEDICATIONS  (STANDING):  apixaban 5 milliGRAM(s) Oral every 12 hours  buDESOnide  80 MICROgram(s)/formoterol 4.5 MICROgram(s) Inhaler 2 Puff(s) Inhalation two times a day  cefepime   IVPB 2000 milliGRAM(s) IV Intermittent every 8 hours  dextrose 5%. 1000 milliLiter(s) (50 mL/Hr) IV Continuous <Continuous>  dextrose 50% Injectable 12.5 Gram(s) IV Push once  dextrose 50% Injectable 25 Gram(s) IV Push once  dextrose 50% Injectable 25 Gram(s) IV Push once  digoxin     Tablet 0.25 milliGRAM(s) Oral daily  insulin glargine Injectable (LANTUS) 12 Unit(s) SubCutaneous at bedtime  insulin lispro (HumaLOG) corrective regimen sliding scale   SubCutaneous three times a day before meals  insulin lispro (HumaLOG) corrective regimen sliding scale   SubCutaneous at bedtime  methylPREDNISolone 4 milliGRAM(s) Oral daily  montelukast 10 milliGRAM(s) Oral daily  pantoprazole    Tablet 40 milliGRAM(s) Oral before breakfast  pregabalin 150 milliGRAM(s) Oral two times a day  tiotropium 18 MICROgram(s) Capsule 1 Capsule(s) Inhalation daily    MEDICATIONS  (PRN):  ALBUTerol    90 MICROgram(s) HFA Inhaler 1 Puff(s) Inhalation every 4 hours PRN Shortness of Breath and/or Wheezing  dextrose 40% Gel 15 Gram(s) Oral once PRN Blood Glucose LESS THAN 70 milliGRAM(s)/deciliter  glucagon  Injectable 1 milliGRAM(s) IntraMuscular once PRN Glucose LESS THAN 70 milligrams/deciliter  loratadine 10 milliGRAM(s) Oral daily PRN allergies    Vital Signs Last 24 Hrs  T(C): 37 (22 May 2018 20:50), Max: 37 (22 May 2018 20:50)  T(F): 98.6 (22 May 2018 20:50), Max: 98.6 (22 May 2018 20:50)  HR: 68 (23 May 2018 06:28) (53 - 87)  BP: 158/77 (22 May 2018 20:50) (147/71 - 158/77)  RR: 18 (22 May 2018 20:50) (18 - 18)  SpO2: 98% (22 May 2018 20:50) (97% - 98%)  CAPILLARY BLOOD GLUCOSE    POCT Blood Glucose.: 207 mg/dL (22 May 2018 22:58)  POCT Blood Glucose.: 268 mg/dL (22 May 2018 20:49)  POCT Blood Glucose.: 301 mg/dL (22 May 2018 16:24)  POCT Blood Glucose.: 269 mg/dL (22 May 2018 11:57)  POCT Blood Glucose.: 192 mg/dL (22 May 2018 07:32)    I&O's Summary    21 May 2018 07:01  -  22 May 2018 07:00  --------------------------------------------------------  IN: 890 mL / OUT: 702 mL / NET: 188 mL    22 May 2018 07:01  -  23 May 2018 06:46  --------------------------------------------------------  IN: 960 mL / OUT: 700 mL / NET: 260 mL    PHYSICAL EXAM:  GENERAL: NAD, well-developed  HEAD:  Atraumatic, Normocephalic  EYES: EOMI, PERRLA, conjunctiva and sclera clear, right eye erythema resolved   NECK: Supple, No JVD  CHEST/LUNG: Clear to auscultation bilaterally; No wheeze, right chest wall port   HEART: Regular rate and rhythm; No murmurs, rubs, or gallops  ABDOMEN: Soft, Nontender, Nondistended; Bowel sounds present  EXTREMITIES:  2+ Peripheral Pulses, No clubbing, cyanosis, or edema  PSYCH: AAOx3  NEUROLOGY: non-focal  SKIN: No rashes or lesions    LABS:                        12.5   4.5   )-----------( 194      ( 22 May 2018 21:33 )             38.6     -    141  |  102  |  17  ----------------------------<  282<H>  3.7   |  26  |  0.61    Ca    8.8      22 May 2018 21:33  Phos  2.0       Mg     2.1         TPro  7.0  /  Alb  3.7  /  TBili  0.2  /  DBili  x   /  AST  35  /  ALT  26  /  AlkPhos  82            Urinalysis Basic - ( 22 May 2018 22:03 )    Color: Yellow / Appearance: Clear / S.017 / pH: x  Gluc: x / Ketone: Negative  / Bili: Negative / Urobili: Negative   Blood: x / Protein: 100 mg/dL / Nitrite: Negative   Leuk Esterase: Negative / RBC: 2-5 /HPF / WBC 5-10 /HPF   Sq Epi: x / Non Sq Epi: x / Bacteria: x        RADIOLOGY & ADDITIONAL TESTS:    Imaging Personally Reviewed: yes    Consultant(s) Notes Reviewed: yes Patient is a 67y old  Female who presents with a chief complaint of fever (21 May 2018 16:56)    SUBJECTIVE / OVERNIGHT EVENTS: Pt. agitated and paranoid overnight, CT head negative, VBG negative. This morning she feels well, daughter at bedside. Anxious to get MRI done. Denies fevers, chills, cp, sob, n/v/d.     MEDICATIONS  (STANDING):  apixaban 5 milliGRAM(s) Oral every 12 hours  buDESOnide  80 MICROgram(s)/formoterol 4.5 MICROgram(s) Inhaler 2 Puff(s) Inhalation two times a day  cefepime   IVPB 2000 milliGRAM(s) IV Intermittent every 8 hours  dextrose 5%. 1000 milliLiter(s) (50 mL/Hr) IV Continuous <Continuous>  dextrose 50% Injectable 12.5 Gram(s) IV Push once  dextrose 50% Injectable 25 Gram(s) IV Push once  dextrose 50% Injectable 25 Gram(s) IV Push once  digoxin     Tablet 0.25 milliGRAM(s) Oral daily  insulin glargine Injectable (LANTUS) 12 Unit(s) SubCutaneous at bedtime  insulin lispro (HumaLOG) corrective regimen sliding scale   SubCutaneous three times a day before meals  insulin lispro (HumaLOG) corrective regimen sliding scale   SubCutaneous at bedtime  methylPREDNISolone 4 milliGRAM(s) Oral daily  montelukast 10 milliGRAM(s) Oral daily  pantoprazole    Tablet 40 milliGRAM(s) Oral before breakfast  pregabalin 150 milliGRAM(s) Oral two times a day  tiotropium 18 MICROgram(s) Capsule 1 Capsule(s) Inhalation daily    MEDICATIONS  (PRN):  ALBUTerol    90 MICROgram(s) HFA Inhaler 1 Puff(s) Inhalation every 4 hours PRN Shortness of Breath and/or Wheezing  dextrose 40% Gel 15 Gram(s) Oral once PRN Blood Glucose LESS THAN 70 milliGRAM(s)/deciliter  glucagon  Injectable 1 milliGRAM(s) IntraMuscular once PRN Glucose LESS THAN 70 milligrams/deciliter  loratadine 10 milliGRAM(s) Oral daily PRN allergies    Vital Signs Last 24 Hrs  T(C): 37 (22 May 2018 20:50), Max: 37 (22 May 2018 20:50)  T(F): 98.6 (22 May 2018 20:50), Max: 98.6 (22 May 2018 20:50)  HR: 68 (23 May 2018 06:28) (53 - 87)  BP: 158/77 (22 May 2018 20:50) (147/71 - 158/77)  RR: 18 (22 May 2018 20:50) (18 - 18)  SpO2: 98% (22 May 2018 20:50) (97% - 98%)  CAPILLARY BLOOD GLUCOSE    POCT Blood Glucose.: 207 mg/dL (22 May 2018 22:58)  POCT Blood Glucose.: 268 mg/dL (22 May 2018 20:49)  POCT Blood Glucose.: 301 mg/dL (22 May 2018 16:24)  POCT Blood Glucose.: 269 mg/dL (22 May 2018 11:57)  POCT Blood Glucose.: 192 mg/dL (22 May 2018 07:32)    I&O's Summary    21 May 2018 07:01  -  22 May 2018 07:00  --------------------------------------------------------  IN: 890 mL / OUT: 702 mL / NET: 188 mL    22 May 2018 07:01  -  23 May 2018 06:46  --------------------------------------------------------  IN: 960 mL / OUT: 700 mL / NET: 260 mL    PHYSICAL EXAM:  GENERAL: NAD, well-developed  HEAD:  Atraumatic, Normocephalic  EYES: EOMI, PERRLA, conjunctiva and sclera clear, right eye erythema resolved   NECK: Supple, No JVD  CHEST/LUNG: Clear to auscultation bilaterally; No wheeze, right chest wall port   HEART: Regular rate and rhythm; No murmurs, rubs, or gallops  ABDOMEN: Soft, Nontender, Nondistended; Bowel sounds present  EXTREMITIES:  2+ Peripheral Pulses, No clubbing, cyanosis, or edema  PSYCH: AAOx3  NEUROLOGY: non-focal  SKIN: No rashes or lesions    LABS:                        12.5   4.5   )-----------( 194      ( 22 May 2018 21:33 )             38.6     -    141  |  102  |  17  ----------------------------<  282<H>  3.7   |  26  |  0.61    Ca    8.8      22 May 2018 21:33  Phos  2.0       Mg     2.1         TPro  7.0  /  Alb  3.7  /  TBili  0.2  /  DBili  x   /  AST  35  /  ALT  26  /  AlkPhos  82            Urinalysis Basic - ( 22 May 2018 22:03 )    Color: Yellow / Appearance: Clear / S.017 / pH: x  Gluc: x / Ketone: Negative  / Bili: Negative / Urobili: Negative   Blood: x / Protein: 100 mg/dL / Nitrite: Negative   Leuk Esterase: Negative / RBC: 2-5 /HPF / WBC 5-10 /HPF   Sq Epi: x / Non Sq Epi: x / Bacteria: x        RADIOLOGY & ADDITIONAL TESTS:    Imaging Personally Reviewed: yes    Consultant(s) Notes Reviewed: yes

## 2018-05-23 NOTE — PROGRESS NOTE ADULT - PROBLEM SELECTOR PLAN 1
unclear etiology--no significant evidence for PNA inspite of CT  ID evaluation needed--on cefepime D5/5

## 2018-05-23 NOTE — PROVIDER CONTACT NOTE (OTHER) - ACTION/TREATMENT ORDERED:
MD aware. Ordered to recheck BP in 2 hours, no further interventions at this time. Safety maintained.
MD aware. Safety maintained.
Team 3 MD assessed pt at bedside. MD ordered STAT blood work and Urgent CT of the head.

## 2018-05-23 NOTE — PROGRESS NOTE ADULT - PROBLEM SELECTOR PLAN 6
-A1C 7.8  -c/w home lantus 12 units at bedtime, hold premeal 7units TID for now, c/w ISS before meals and at bedtime -saturating 95% on RA with productive cough  -c/w albuterol, spiriva, montelukast, breo not on formulary will do symbicort  -pulmonology recommendations aprpeciated

## 2018-05-23 NOTE — BEHAVIORAL HEALTH ASSESSMENT NOTE - RISK ASSESSMENT
low, pt denies si/hi, and denies past psychiatric hx, and this is confirmed by pt's dtr, Zoe. Pt currently paranoid, no command hallucinations.

## 2018-05-23 NOTE — PROGRESS NOTE ADULT - ATTENDING COMMENTS
as above--unclear etiology of temps--doubt PNA--clinically the best that she has been  continue inhaler regimen/acapella etc  *****ID evaluation to tailor abx (completing D5 of 5 empiric rx)                        Consider Sp and sw evaln  *****Neuro evaln pre DC (MS issues)    Eulalio Allison MD-Pulmonary   [phone]

## 2018-05-23 NOTE — BEHAVIORAL HEALTH ASSESSMENT NOTE - NSBHCHARTREVIEWVS_PSY_A_CORE FT
T(C): 37 (05-22-18 @ 20:50), Max: 37 (05-22-18 @ 20:50)  HR: 68 (05-23-18 @ 06:28) (53 - 87)  BP: 158/77 (05-22-18 @ 20:50) (147/71 - 158/77)  RR: 18 (05-22-18 @ 20:50) (18 - 18)  SpO2: 98% (05-22-18 @ 20:50) (97% - 98%)  Wt(kg): --

## 2018-05-23 NOTE — PROVIDER CONTACT NOTE (OTHER) - RECOMMENDATIONS
Make MD aware. BP meds.
Psych consult.
Team 3 MD made aware of pt. RN asked MD for further bedside assessment of pt. Contacted unit manager to further understand pt. issues and calm pt.

## 2018-05-23 NOTE — PROVIDER CONTACT NOTE (OTHER) - ASSESSMENT
Pt. VSS /77, HR 87, O2 98, RR 18, T- 98.6 F, Blood Sugar 268. Pt. reoriented by RN. RN attempt to calm pt. Pt. continued to be agitated, unwilling to stay in room.

## 2018-05-23 NOTE — PROGRESS NOTE ADULT - PROBLEM SELECTOR PLAN 1
-resolved, on admission febrile, tachycardic, WBC of 5, ANC 3200  -possibly 2/2 lung source in the setting of chronic bronchitis however CT chest negative for PNA, CTAP (-), RVP/CT head/CXR negative, has pelvic/knee replacements (joints benign on PE), mediport site non-concerning  -BCx NTD, UCx negative, d/c vancomycin and flagyl, c/w cefepime day 5/5, tapering stress dose steroids in the setting of adrenal insufficiency -new paranoia over last 24hours, likely steroid induced psychosis  -zyprexa 2.5mg PO, per donald pt. does not have capacity to leave AMA  -constant observation, psychiatry recommendations appreciated

## 2018-05-23 NOTE — PROGRESS NOTE ADULT - SUBJECTIVE AND OBJECTIVE BOX
68 y/o woman with hx of afib on Eliquis, asthma, bilateral knee replacement, adrenal insuf, DM, diagnosed with anal cancer in 2017, squamous cell. A PET scan did not reveal any local or metastatic disease. She received Mitomycin on 5/3/2017 (dose #2 mitomycin on 2017) and Xeloda. Radiation 5/3/17 to 17. On 2018 patient had local relapse in anus proved by biopsy. It is SCC and HPV / p16 is positive. Not a surgical candidate for APR due to pulmonary and cardiac risk.  Patient received radiation hyperfractionated for 10 days. She completed on 2018. She took Xeloda concurrent with it. Last chemo 1 month ago. She was admitted with fever, sepsis, possibly  related to bronchitis, currently afebrile.      INTERVAL HPI/OVERNIGHT EVENTS:  Patient S&E at bedside. No o/n events,     VITAL SIGNS:  T(F): 98.6 (18 @ 20:50)  HR: 68 (18 @ 06:28)  BP: 158/77 (18 @ 20:50)  RR: 18 (18 @ 20:50)  SpO2: 98% (18 @ 20:50)  Wt(kg): --    PHYSICAL EXAM:    Constitutional: NAD  Eyes: EOMI, sclera non-icteric  Neck: supple, no masses, no JVD  Respiratory: CTA b/l, good air entry b/l  Cardiovascular: RRR, no M/R/G  Gastrointestinal: soft, NTND, no masses palpable, + BS, no hepatosplenomegaly  Extremities: no c/c/e  Neurological: AAOx3      MEDICATIONS  (STANDING):  apixaban 5 milliGRAM(s) Oral every 12 hours  buDESOnide  80 MICROgram(s)/formoterol 4.5 MICROgram(s) Inhaler 2 Puff(s) Inhalation two times a day  cefepime   IVPB 2000 milliGRAM(s) IV Intermittent every 8 hours  dextrose 5%. 1000 milliLiter(s) (50 mL/Hr) IV Continuous <Continuous>  dextrose 50% Injectable 12.5 Gram(s) IV Push once  dextrose 50% Injectable 25 Gram(s) IV Push once  dextrose 50% Injectable 25 Gram(s) IV Push once  digoxin     Tablet 0.25 milliGRAM(s) Oral daily  insulin glargine Injectable (LANTUS) 12 Unit(s) SubCutaneous at bedtime  insulin lispro (HumaLOG) corrective regimen sliding scale   SubCutaneous three times a day before meals  insulin lispro (HumaLOG) corrective regimen sliding scale   SubCutaneous at bedtime  methylPREDNISolone 4 milliGRAM(s) Oral daily  montelukast 10 milliGRAM(s) Oral daily  pantoprazole    Tablet 40 milliGRAM(s) Oral before breakfast  pregabalin 150 milliGRAM(s) Oral two times a day  tiotropium 18 MICROgram(s) Capsule 1 Capsule(s) Inhalation daily    MEDICATIONS  (PRN):  ALBUTerol    90 MICROgram(s) HFA Inhaler 1 Puff(s) Inhalation every 4 hours PRN Shortness of Breath and/or Wheezing  dextrose 40% Gel 15 Gram(s) Oral once PRN Blood Glucose LESS THAN 70 milliGRAM(s)/deciliter  glucagon  Injectable 1 milliGRAM(s) IntraMuscular once PRN Glucose LESS THAN 70 milligrams/deciliter  loratadine 10 milliGRAM(s) Oral daily PRN allergies      Allergies    ampicillin (Short breath)  aspirin (Short breath)  Avelox (Short breath)  codeine (Short breath)  Dilaudid (Short breath)  iodine (Short breath)  penicillin (Short breath)  shellfish (Anaphylaxis)  tetanus toxoid (Short breath)  Valium (Short breath)    Intolerances        LABS:                        12.5   4.5   )-----------( 194      ( 22 May 2018 21:33 )             38.6     05-    141  |  102  |  17  ----------------------------<  282<H>  3.7   |  26  |  0.61    Ca    8.8      22 May 2018 21:33  Phos  2.0     05-  Mg     2.1         TPro  7.0  /  Alb  3.7  /  TBili  0.2  /  DBili  x   /  AST  35  /  ALT  26  /  AlkPhos  82  -      Urinalysis Basic - ( 22 May 2018 22:03 )    Color: Yellow / Appearance: Clear / S.017 / pH: x  Gluc: x / Ketone: Negative  / Bili: Negative / Urobili: Negative   Blood: x / Protein: 100 mg/dL / Nitrite: Negative   Leuk Esterase: Negative / RBC: 2-5 /HPF / WBC 5-10 /HPF   Sq Epi: x / Non Sq Epi: x / Bacteria: x

## 2018-05-23 NOTE — BEHAVIORAL HEALTH ASSESSMENT NOTE - NSBHCONSULTRECOMMENDOTHER_PSY_A_CORE FT
1) at this time, pt paranoid, will require anti-psychotic medication, such as zyprexa 2.5 mg po x 1; pt somewhat agreeable; if she refuses, and is agitated, will require IM dose, zyprexa 2.5 mg x 1  2) 1) at this time, pt paranoid, will require anti-psychotic medication, such as zyprexa 2.5 mg po x 1; pt somewhat agreeable; if she refuses, and is agitated, will require IM dose, zyprexa 2.5 mg x 1  2) for ongoing acute agitation, consider haldol 2 mg IM/IV/PO Q4hr PRN, f/u QTc please  3) if pt responds to above one time dose of zyprexa, and has had her last dose of antibiotics, she may be psychiatrically cleared for d/c home to [place]  4) dtr is aware of above plan, and is agreeable, as she is pt's HCP 1) at this time, pt paranoid, will require anti-psychotic medication, consider zyprexa 2.5 mg po BID for steroid induced psychosis; it pt refuses, consider IM injection  2) for acute agitation, consider haldol 1 mg IM/IV/PO Q4hr PRN, f/u QTc please  3) dtr is aware of above plan, and is agreeable, as she is pt's HCP

## 2018-05-23 NOTE — PROGRESS NOTE ADULT - SUBJECTIVE AND OBJECTIVE BOX
CHIEF COMPLAINT: feels good over all-no change in cough; tired of being here    Interval Events: behavioural issues o/n; CT head wnl    REVIEW OF SYSTEMS:  Constitutional: No fevers or chills. No weight loss. No fatigue or generalized malaise.  Eyes: No itching or discharge from the eyes  ENT: No ear pain. No ear discharge. No nasal congestion. No post nasal drip. No epistaxis. No throat pain. No sore throat. No difficulty swallowing.   CV: No chest pain. No palpitations. No lightheadedness or dizziness.   Resp: No dyspnea at rest. No dyspnea on exertion. No orthopnea. No wheezing. No cough. No stridor. No sputum production. No chest pain with respiration.  GI: No nausea. No vomiting. No diarrhea.  MSK: No joint pain or pain in any extremities  Integumentary: No skin lesions. No pedal edema.  Neurological: No gross motor weakness. No sensory changes.  [+ ] All other systems negative  [ ] Unable to assess ROS because ________    OBJECTIVE:  ICU Vital Signs Last 24 Hrs  T(C): 37 (22 May 2018 20:50), Max: 37 (22 May 2018 20:50)  T(F): 98.6 (22 May 2018 20:50), Max: 98.6 (22 May 2018 20:50)  HR: 87 (22 May 2018 20:50) (53 - 87)  BP: 158/77 (22 May 2018 20:50) (147/71 - 158/77)  BP(mean): --  ABP: --  ABP(mean): --  RR: 18 (22 May 2018 20:50) (18 - 18)  SpO2: 98% (22 May 2018 20:50) (97% - 98%)         @ 07:  -   @ 07:00  --------------------------------------------------------  IN: 890 mL / OUT: 702 mL / NET: 188 mL     @ 07:01  -   @ 04:58  --------------------------------------------------------  IN: 960 mL / OUT: 700 mL / NET: 260 mL      CAPILLARY BLOOD GLUCOSE      POCT Blood Glucose.: 207 mg/dL (22 May 2018 22:58)      PHYSICAL EXAM: NAD in bed  General: Awake, alert, oriented X 3.   HEENT: Atraumatic, normocephalic.                 Mallampatti Grade 2                No nasal congestion.                No tonsillar or pharyngeal exudates.  Lymph Nodes: No palpable lymphadenopathy  Neck: No JVD. No carotid bruit.   Respiratory: Normal chest expansion                         Normal percussion                         Normal and equal air entry                         No wheeze, rhonchi or rales.  Cardiovascular: S1 S2 normal. + murmurs, rubs or gallops.   Abdomen: Soft, non-tender, non-distended. No organomegaly.  Extremities: Warm to touch. Peripheral pulse palpable. No pedal edema.   Skin: No rashes or skin lesions  Neurological: Motor and sensory examination equal and normal in all four extremities.  Psychiatry: Appropriate mood and affect.    HOSPITAL MEDICATIONS:  MEDICATIONS  (STANDING):  apixaban 5 milliGRAM(s) Oral every 12 hours  buDESOnide  80 MICROgram(s)/formoterol 4.5 MICROgram(s) Inhaler 2 Puff(s) Inhalation two times a day  cefepime   IVPB 2000 milliGRAM(s) IV Intermittent every 8 hours  dextrose 5%. 1000 milliLiter(s) (50 mL/Hr) IV Continuous <Continuous>  dextrose 50% Injectable 12.5 Gram(s) IV Push once  dextrose 50% Injectable 25 Gram(s) IV Push once  dextrose 50% Injectable 25 Gram(s) IV Push once  digoxin     Tablet 0.25 milliGRAM(s) Oral daily  insulin glargine Injectable (LANTUS) 12 Unit(s) SubCutaneous at bedtime  insulin lispro (HumaLOG) corrective regimen sliding scale   SubCutaneous three times a day before meals  insulin lispro (HumaLOG) corrective regimen sliding scale   SubCutaneous at bedtime  methylPREDNISolone 4 milliGRAM(s) Oral daily  montelukast 10 milliGRAM(s) Oral daily  pantoprazole    Tablet 40 milliGRAM(s) Oral before breakfast  pregabalin 150 milliGRAM(s) Oral two times a day  tiotropium 18 MICROgram(s) Capsule 1 Capsule(s) Inhalation daily    MEDICATIONS  (PRN):  ALBUTerol    90 MICROgram(s) HFA Inhaler 1 Puff(s) Inhalation every 4 hours PRN Shortness of Breath and/or Wheezing  dextrose 40% Gel 15 Gram(s) Oral once PRN Blood Glucose LESS THAN 70 milliGRAM(s)/deciliter  glucagon  Injectable 1 milliGRAM(s) IntraMuscular once PRN Glucose LESS THAN 70 milligrams/deciliter  loratadine 10 milliGRAM(s) Oral daily PRN allergies      LABS:                        12.5   4.5   )-----------( 194      ( 22 May 2018 21:33 )             38.6     -    141  |  102  |  17  ----------------------------<  282<H>  3.7   |  26  |  0.61    Ca    8.8      22 May 2018 21:33  Phos  2.0       Mg     2.1         TPro  7.0  /  Alb  3.7  /  TBili  0.2  /  DBili  x   /  AST  35  /  ALT  26  /  AlkPhos  82        Urinalysis Basic - ( 22 May 2018 22:03 )    Color: Yellow / Appearance: Clear / S.017 / pH: x  Gluc: x / Ketone: Negative  / Bili: Negative / Urobili: Negative   Blood: x / Protein: 100 mg/dL / Nitrite: Negative   Leuk Esterase: Negative / RBC: 2-5 /HPF / WBC 5-10 /HPF   Sq Epi: x / Non Sq Epi: x / Bacteria: x        Venous Blood Gas:   @ 21:29  7.43/43/33/28/60  VBG Lactate: 1.4      MICROBIOLOGY:     RADIOLOGY:  [ ] Reviewed and interpreted by me    Point of Care Ultrasound Findings:    PFT:    EKG:
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
## 2018-05-23 NOTE — BEHAVIORAL HEALTH ASSESSMENT NOTE - NSBHCHARTREVIEWLAB_PSY_A_CORE FT
12.5   4.5   )-----------( 194      ( 22 May 2018 21:33 )             38.6   05-22    141  |  102  |  17  ----------------------------<  282<H>  3.7   |  26  |  0.61    Ca    8.8      22 May 2018 21:33  Phos  2.0     05-22  Mg     2.1     05-22    TPro  7.0  /  Alb  3.7  /  TBili  0.2  /  DBili  x   /  AST  35  /  ALT  26  /  AlkPhos  82  05-22
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
## 2018-05-23 NOTE — PROVIDER CONTACT NOTE (OTHER) - ASSESSMENT
Pt A&Ox4, VSS at this time. Pt refuses to stay on unit or room-states she is leaving today-MD aware spoke physically to MD who states psych will be consulted. Pt A&Ox4, VSS at this time. Pt refuses to stay on unit or room-states she is leaving today-MD aware spoke physically to MD who states psych will be consulted. Education completed on safety concern and necessity to be in room in case of emergency and to round on patient-pt with verbalized understanding and still adamantly refusing to go back to unit/room.

## 2018-05-23 NOTE — PROGRESS NOTE ADULT - ASSESSMENT
68 y/o woman with recurrent anal cancer on ttx, admittted with sepsis now resolving.    Plan: anal cancer: obtain MRI of the pelvis before discharge if possible.

## 2018-05-23 NOTE — PROGRESS NOTE ADULT - PROBLEM SELECTOR PLAN 8
DVT ppx: therapeutically anticoagulated with Eliquis  Diet: Carbohydrate restricted  Dispo: home with PT and rolling walker -soft BPs now on stress dose steroids as above  -weaning off stress steroids, resume home BP meds as tolerated

## 2018-05-23 NOTE — PROGRESS NOTE ADULT - PROBLEM SELECTOR PLAN 3
-HR controlled, tachycardic on admission  -weakness/unsteady gait per history, non-focal neuro exam, CT head negative  -CHADSVASC of 5, c/w digoxin and Eliquis -anorectal SCC dx in 2016, s/p 2 cycles chemotherapy/radiation (last 1 month ago)  -f/u MR abdomen  -oncology recs appreciated
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
## 2018-05-23 NOTE — PROGRESS NOTE ADULT - ASSESSMENT
68 yo Asthma, TBM-s/p tracheoplasty, gerd, allergic rhinits, frequent infections and advanced colon cancer--admit w/  fevers.    Fevers resolved--await of MRI of hip; CT head wnl

## 2018-05-23 NOTE — PROGRESS NOTE ADULT - ATTENDING COMMENTS
Per discussion with the patient, she became upset last night over an issue with her roommate, and then felt that the staff was upset with her. She feels better today. Continue with 1:1 sit. Appreciate psych recs. IV steroids d/judi. Continue to monitor closely.

## 2018-05-23 NOTE — PROGRESS NOTE ADULT - PROBLEM SELECTOR PLAN 4
-unclear etiology  -tapering stress dose steroids in the setting of sepsis, resume home regimen today pantoprazole for GI ppx 40mg daily -HR controlled, tachycardic on admission  -weakness/unsteady gait per history, non-focal neuro exam, CT head negative  -CHADSVASC of 5, c/w digoxin and Eliquis

## 2018-05-23 NOTE — PROGRESS NOTE ADULT - PROBLEM SELECTOR PLAN 2
-anorectal SCC dx in 2016, s/p 2 cycles chemotherapy/radiation (last 1 month ago)  -f/u MR abdomen  -oncology recs appreciated -resolved, on admission febrile, tachycardic, WBC of 5, ANC 3200  -possibly 2/2 lung source in the setting of chronic bronchitis however CT chest negative for PNA, CTAP (-), RVP/CT head/CXR negative, has pelvic/knee replacements (joints benign on PE), mediport site non-concerning  -BCx NTD, UCx negative, d/c vancomycin and flagyl, c/w cefepime day 5/5, tapering stress dose steroids in the setting of adrenal insufficiency
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
## 2018-05-23 NOTE — PROGRESS NOTE ADULT - PROBLEM SELECTOR PLAN 7
-soft BPs now on stress dose steroids as above  -weaning off stress steroids, resume home BP meds as tolerated -A1C 7.8  -c/w home lantus 12 units at bedtime, hold premeal 7units TID for now, c/w ISS before meals and at bedtime

## 2018-05-23 NOTE — PROVIDER CONTACT NOTE (OTHER) - ASSESSMENT
Pt A&Ox4, all other VSS at this time. Pt denies chest pain, pressure, or palpitations. Pt stable at this time. Pt denies s/s of hypertensions-no lightheadedness, dizziness.

## 2018-05-23 NOTE — PROGRESS NOTE ADULT - PROBLEM SELECTOR PLAN 5
-saturating 95% on RA with productive cough  -c/w albuterol, spiriva, montelukast, breo not on formulary will do symbicort  -pulmonology recommendations aprpeciated -unclear etiology  -tapering stress dose steroids in the setting of sepsis, resume home regimen today pantoprazole for GI ppx 40mg daily

## 2018-05-24 ENCOUNTER — APPOINTMENT (OUTPATIENT)
Dept: RADIATION ONCOLOGY | Facility: CLINIC | Age: 68
End: 2018-05-24

## 2018-05-24 ENCOUNTER — OUTPATIENT (OUTPATIENT)
Dept: OUTPATIENT SERVICES | Facility: HOSPITAL | Age: 68
LOS: 1 days | Discharge: HOME | End: 2018-05-24

## 2018-05-24 VITALS
RESPIRATION RATE: 18 BRPM | TEMPERATURE: 99 F | OXYGEN SATURATION: 94 % | SYSTOLIC BLOOD PRESSURE: 179 MMHG | DIASTOLIC BLOOD PRESSURE: 85 MMHG | HEART RATE: 116 BPM

## 2018-05-24 DIAGNOSIS — Z98.89 OTHER SPECIFIED POSTPROCEDURAL STATES: Chronic | ICD-10-CM

## 2018-05-24 DIAGNOSIS — H05.352 EXOSTOSIS OF LEFT ORBIT: Chronic | ICD-10-CM

## 2018-05-24 DIAGNOSIS — C18.9 MALIGNANT NEOPLASM OF COLON, UNSPECIFIED: ICD-10-CM

## 2018-05-24 DIAGNOSIS — Z96.653 PRESENCE OF ARTIFICIAL KNEE JOINT, BILATERAL: Chronic | ICD-10-CM

## 2018-05-24 LAB
ANION GAP SERPL CALC-SCNC: 11 MMOL/L — SIGNIFICANT CHANGE UP (ref 5–17)
BUN SERPL-MCNC: 14 MG/DL — SIGNIFICANT CHANGE UP (ref 7–23)
CALCIUM SERPL-MCNC: 8.6 MG/DL — SIGNIFICANT CHANGE UP (ref 8.4–10.5)
CHLORIDE SERPL-SCNC: 103 MMOL/L — SIGNIFICANT CHANGE UP (ref 96–108)
CO2 SERPL-SCNC: 30 MMOL/L — SIGNIFICANT CHANGE UP (ref 22–31)
CREAT SERPL-MCNC: 0.68 MG/DL — SIGNIFICANT CHANGE UP (ref 0.5–1.3)
CULTURE RESULTS: SIGNIFICANT CHANGE UP
CULTURE RESULTS: SIGNIFICANT CHANGE UP
GLUCOSE BLDC GLUCOMTR-MCNC: 172 MG/DL — HIGH (ref 70–99)
GLUCOSE BLDC GLUCOMTR-MCNC: 333 MG/DL — HIGH (ref 70–99)
GLUCOSE SERPL-MCNC: 179 MG/DL — HIGH (ref 70–99)
HCT VFR BLD CALC: 36.9 % — SIGNIFICANT CHANGE UP (ref 34.5–45)
HGB BLD-MCNC: 11.8 G/DL — SIGNIFICANT CHANGE UP (ref 11.5–15.5)
MAGNESIUM SERPL-MCNC: 2.2 MG/DL — SIGNIFICANT CHANGE UP (ref 1.6–2.6)
MCHC RBC-ENTMCNC: 23.5 PG — LOW (ref 27–34)
MCHC RBC-ENTMCNC: 32 GM/DL — SIGNIFICANT CHANGE UP (ref 32–36)
MCV RBC AUTO: 73.5 FL — LOW (ref 80–100)
PHOSPHATE SERPL-MCNC: 2.9 MG/DL — SIGNIFICANT CHANGE UP (ref 2.5–4.5)
PLATELET # BLD AUTO: 227 K/UL — SIGNIFICANT CHANGE UP (ref 150–400)
POTASSIUM SERPL-MCNC: 3.8 MMOL/L — SIGNIFICANT CHANGE UP (ref 3.5–5.3)
POTASSIUM SERPL-SCNC: 3.8 MMOL/L — SIGNIFICANT CHANGE UP (ref 3.5–5.3)
RBC # BLD: 5.02 M/UL — SIGNIFICANT CHANGE UP (ref 3.8–5.2)
RBC # FLD: 17 % — HIGH (ref 10.3–14.5)
SODIUM SERPL-SCNC: 144 MMOL/L — SIGNIFICANT CHANGE UP (ref 135–145)
SPECIMEN SOURCE: SIGNIFICANT CHANGE UP
SPECIMEN SOURCE: SIGNIFICANT CHANGE UP
WBC # BLD: 5.73 K/UL — SIGNIFICANT CHANGE UP (ref 3.8–10.5)
WBC # FLD AUTO: 5.73 K/UL — SIGNIFICANT CHANGE UP (ref 3.8–10.5)

## 2018-05-24 PROCEDURE — 97161 PT EVAL LOW COMPLEX 20 MIN: CPT

## 2018-05-24 PROCEDURE — 82330 ASSAY OF CALCIUM: CPT

## 2018-05-24 PROCEDURE — 71045 X-RAY EXAM CHEST 1 VIEW: CPT

## 2018-05-24 PROCEDURE — 87633 RESP VIRUS 12-25 TARGETS: CPT

## 2018-05-24 PROCEDURE — 83036 HEMOGLOBIN GLYCOSYLATED A1C: CPT

## 2018-05-24 PROCEDURE — 87086 URINE CULTURE/COLONY COUNT: CPT

## 2018-05-24 PROCEDURE — 81001 URINALYSIS AUTO W/SCOPE: CPT

## 2018-05-24 PROCEDURE — 99232 SBSQ HOSP IP/OBS MODERATE 35: CPT

## 2018-05-24 PROCEDURE — 82947 ASSAY GLUCOSE BLOOD QUANT: CPT

## 2018-05-24 PROCEDURE — 84484 ASSAY OF TROPONIN QUANT: CPT

## 2018-05-24 PROCEDURE — 99239 HOSP IP/OBS DSCHRG MGMT >30: CPT

## 2018-05-24 PROCEDURE — 93005 ELECTROCARDIOGRAM TRACING: CPT

## 2018-05-24 PROCEDURE — 93010 ELECTROCARDIOGRAM REPORT: CPT

## 2018-05-24 PROCEDURE — 94640 AIRWAY INHALATION TREATMENT: CPT

## 2018-05-24 PROCEDURE — 80053 COMPREHEN METABOLIC PANEL: CPT

## 2018-05-24 PROCEDURE — 85014 HEMATOCRIT: CPT

## 2018-05-24 PROCEDURE — 70450 CT HEAD/BRAIN W/O DYE: CPT

## 2018-05-24 PROCEDURE — 82803 BLOOD GASES ANY COMBINATION: CPT

## 2018-05-24 PROCEDURE — 71250 CT THORAX DX C-: CPT

## 2018-05-24 PROCEDURE — 80048 BASIC METABOLIC PNL TOTAL CA: CPT

## 2018-05-24 PROCEDURE — 85730 THROMBOPLASTIN TIME PARTIAL: CPT

## 2018-05-24 PROCEDURE — 84100 ASSAY OF PHOSPHORUS: CPT

## 2018-05-24 PROCEDURE — 87040 BLOOD CULTURE FOR BACTERIA: CPT

## 2018-05-24 PROCEDURE — 84295 ASSAY OF SERUM SODIUM: CPT

## 2018-05-24 PROCEDURE — 85027 COMPLETE CBC AUTOMATED: CPT

## 2018-05-24 PROCEDURE — 82962 GLUCOSE BLOOD TEST: CPT

## 2018-05-24 PROCEDURE — 87798 DETECT AGENT NOS DNA AMP: CPT

## 2018-05-24 PROCEDURE — 82435 ASSAY OF BLOOD CHLORIDE: CPT

## 2018-05-24 PROCEDURE — 87581 M.PNEUMON DNA AMP PROBE: CPT

## 2018-05-24 PROCEDURE — 83735 ASSAY OF MAGNESIUM: CPT

## 2018-05-24 PROCEDURE — 96374 THER/PROPH/DIAG INJ IV PUSH: CPT

## 2018-05-24 PROCEDURE — 74176 CT ABD & PELVIS W/O CONTRAST: CPT

## 2018-05-24 PROCEDURE — 96375 TX/PRO/DX INJ NEW DRUG ADDON: CPT

## 2018-05-24 PROCEDURE — 99285 EMERGENCY DEPT VISIT HI MDM: CPT | Mod: 25

## 2018-05-24 PROCEDURE — 80202 ASSAY OF VANCOMYCIN: CPT

## 2018-05-24 PROCEDURE — 85610 PROTHROMBIN TIME: CPT

## 2018-05-24 PROCEDURE — 84132 ASSAY OF SERUM POTASSIUM: CPT

## 2018-05-24 PROCEDURE — 83605 ASSAY OF LACTIC ACID: CPT

## 2018-05-24 PROCEDURE — 87486 CHLMYD PNEUM DNA AMP PROBE: CPT

## 2018-05-24 RX ORDER — APIXABAN 2.5 MG/1
1 TABLET, FILM COATED ORAL
Qty: 0 | Refills: 0 | COMMUNITY

## 2018-05-24 RX ORDER — VALSARTAN 80 MG/1
40 TABLET ORAL DAILY
Qty: 0 | Refills: 0 | Status: DISCONTINUED | OUTPATIENT
Start: 2018-05-24 | End: 2018-05-24

## 2018-05-24 RX ORDER — APIXABAN 2.5 MG/1
1 TABLET, FILM COATED ORAL
Qty: 0 | Refills: 0 | DISCHARGE
Start: 2018-05-24

## 2018-05-24 RX ORDER — OLANZAPINE 15 MG/1
2.5 TABLET, FILM COATED ORAL ONCE
Qty: 0 | Refills: 0 | Status: COMPLETED | OUTPATIENT
Start: 2018-05-24 | End: 2018-05-24

## 2018-05-24 RX ORDER — OLANZAPINE 15 MG/1
1 TABLET, FILM COATED ORAL
Qty: 3 | Refills: 0 | OUTPATIENT
Start: 2018-05-24 | End: 2018-05-26

## 2018-05-24 RX ORDER — OLANZAPINE 15 MG/1
1 TABLET, FILM COATED ORAL
Qty: 6 | Refills: 0 | OUTPATIENT
Start: 2018-05-24 | End: 2018-05-26

## 2018-05-24 RX ADMIN — OLANZAPINE 2.5 MILLIGRAM(S): 15 TABLET, FILM COATED ORAL at 11:20

## 2018-05-24 RX ADMIN — Medication 1: at 07:59

## 2018-05-24 RX ADMIN — TIOTROPIUM BROMIDE 1 CAPSULE(S): 18 CAPSULE ORAL; RESPIRATORY (INHALATION) at 11:20

## 2018-05-24 RX ADMIN — Medication 4: at 11:54

## 2018-05-24 RX ADMIN — MONTELUKAST 10 MILLIGRAM(S): 4 TABLET, CHEWABLE ORAL at 11:20

## 2018-05-24 RX ADMIN — Medication 150 MILLIGRAM(S): at 05:22

## 2018-05-24 RX ADMIN — Medication 4 MILLIGRAM(S): at 05:22

## 2018-05-24 RX ADMIN — VALSARTAN 40 MILLIGRAM(S): 80 TABLET ORAL at 11:20

## 2018-05-24 RX ADMIN — Medication 0.25 MILLIGRAM(S): at 05:22

## 2018-05-24 RX ADMIN — BUDESONIDE AND FORMOTEROL FUMARATE DIHYDRATE 2 PUFF(S): 160; 4.5 AEROSOL RESPIRATORY (INHALATION) at 05:22

## 2018-05-24 RX ADMIN — APIXABAN 5 MILLIGRAM(S): 2.5 TABLET, FILM COATED ORAL at 11:20

## 2018-05-24 RX ADMIN — PANTOPRAZOLE SODIUM 40 MILLIGRAM(S): 20 TABLET, DELAYED RELEASE ORAL at 05:22

## 2018-05-24 NOTE — PROGRESS NOTE BEHAVIORAL HEALTH - NSBHCONSULTRECOMMENDOTHER_PSY_A_CORE FT
1) Consider continuing Zyprexa 2.5 mg po BID for three more days for steroid induced psychosis; then stop. Pt explained plan, she agrees.  2) Pt counselled about resuming Zyprexa if symptoms recur

## 2018-05-24 NOTE — PROGRESS NOTE ADULT - SUBJECTIVE AND OBJECTIVE BOX
CHIEF COMPLAINT:    Interval Events:    REVIEW OF SYSTEMS:  Constitutional: No fevers or chills. No weight loss. No fatigue or generalized malaise.  Eyes: No itching or discharge from the eyes  ENT: No ear pain. No ear discharge. No nasal congestion. No post nasal drip. No epistaxis. No throat pain. No sore throat. No difficulty swallowing.   CV: No chest pain. No palpitations. No lightheadedness or dizziness.   Resp: No dyspnea at rest. No dyspnea on exertion. No orthopnea. No wheezing. No cough. No stridor. No sputum production. No chest pain with respiration.  GI: No nausea. No vomiting. No diarrhea.  MSK: No joint pain or pain in any extremities  Integumentary: No skin lesions. No pedal edema.  Neurological: No gross motor weakness. No sensory changes.  [ ] All other systems negative  [ ] Unable to assess ROS because ________    OBJECTIVE:  ICU Vital Signs Last 24 Hrs  T(C): 37.1 (24 May 2018 04:43), Max: 37.3 (23 May 2018 20:20)  T(F): 98.8 (24 May 2018 04:43), Max: 99.2 (23 May 2018 20:20)  HR: 76 (24 May 2018 04:43) (68 - 100)  BP: 149/70 (24 May 2018 04:43) (149/70 - 172/78)  BP(mean): --  ABP: --  ABP(mean): --  RR: 18 (24 May 2018 04:43) (18 - 20)  SpO2: 96% (24 May 2018 04:43) (96% - 98%)         @ 07: @ 07:00  --------------------------------------------------------  IN: 1010 mL / OUT: 700 mL / NET: 310 mL     @ 07:01  -  24 @ 05:07  --------------------------------------------------------  IN: 1050 mL / OUT: 0 mL / NET: 1050 mL      CAPILLARY BLOOD GLUCOSE      POCT Blood Glucose.: 309 mg/dL (23 May 2018 21:20)      PHYSICAL EXAM:  General: Awake, alert, oriented X 3.   HEENT: Atraumatic, normocephalic.                 Mallampatti Grade                 No nasal congestion.                No tonsillar or pharyngeal exudates.  Lymph Nodes: No palpable lymphadenopathy  Neck: No JVD. No carotid bruit.   Respiratory: Normal chest expansion                         Normal percussion                         Normal and equal air entry                         No wheeze, rhonchi or rales.  Cardiovascular: S1 S2 normal. No murmurs, rubs or gallops.   Abdomen: Soft, non-tender, non-distended. No organomegaly.  Extremities: Warm to touch. Peripheral pulse palpable. No pedal edema.   Skin: No rashes or skin lesions  Neurological: Motor and sensory examination equal and normal in all four extremities.  Psychiatry: Appropriate mood and affect.    HOSPITAL MEDICATIONS:  MEDICATIONS  (STANDING):  apixaban 5 milliGRAM(s) Oral every 12 hours  buDESOnide  80 MICROgram(s)/formoterol 4.5 MICROgram(s) Inhaler 2 Puff(s) Inhalation two times a day  dextrose 5%. 1000 milliLiter(s) (50 mL/Hr) IV Continuous <Continuous>  dextrose 50% Injectable 12.5 Gram(s) IV Push once  dextrose 50% Injectable 25 Gram(s) IV Push once  dextrose 50% Injectable 25 Gram(s) IV Push once  digoxin     Tablet 0.25 milliGRAM(s) Oral daily  insulin glargine Injectable (LANTUS) 12 Unit(s) SubCutaneous at bedtime  insulin lispro (HumaLOG) corrective regimen sliding scale   SubCutaneous three times a day before meals  insulin lispro (HumaLOG) corrective regimen sliding scale   SubCutaneous at bedtime  methylPREDNISolone 4 milliGRAM(s) Oral daily  montelukast 10 milliGRAM(s) Oral daily  OLANZapine 2.5 milliGRAM(s) Oral at bedtime  pantoprazole    Tablet 40 milliGRAM(s) Oral before breakfast  pregabalin 150 milliGRAM(s) Oral two times a day  tiotropium 18 MICROgram(s) Capsule 1 Capsule(s) Inhalation daily    MEDICATIONS  (PRN):  ALBUTerol    90 MICROgram(s) HFA Inhaler 1 Puff(s) Inhalation every 4 hours PRN Shortness of Breath and/or Wheezing  dextrose 40% Gel 15 Gram(s) Oral once PRN Blood Glucose LESS THAN 70 milliGRAM(s)/deciliter  glucagon  Injectable 1 milliGRAM(s) IntraMuscular once PRN Glucose LESS THAN 70 milligrams/deciliter  haloperidol    Injectable 1 milliGRAM(s) IV Push every 4 hours PRN agitation  loratadine 10 milliGRAM(s) Oral daily PRN allergies      LABS:                        12.5   4.5   )-----------( 194      ( 22 May 2018 21:33 )             38.6         141  |  102  |  17  ----------------------------<  282<H>  3.7   |  26  |  0.61    Ca    8.8      22 May 2018 21:33  Phos  2.0       Mg     2.1         TPro  7.0  /  Alb  3.7  /  TBili  0.2  /  DBili  x   /  AST  35  /  ALT  26  /  AlkPhos  82        Urinalysis Basic - ( 22 May 2018 22:03 )    Color: Yellow / Appearance: Clear / S.017 / pH: x  Gluc: x / Ketone: Negative  / Bili: Negative / Urobili: Negative   Blood: x / Protein: 100 mg/dL / Nitrite: Negative   Leuk Esterase: Negative / RBC: 2-5 /HPF / WBC 5-10 /HPF   Sq Epi: x / Non Sq Epi: x / Bacteria: x        Venous Blood Gas:   @ 21:29  7.43/43/33/28/60  VBG Lactate: 1.4      MICROBIOLOGY:     RADIOLOGY:  [ ] Reviewed and interpreted by me    Point of Care Ultrasound Findings:    PFT:    EKG: CHIEF COMPLAINT: more alert--cant remember day before; mild cough and sob    Interval Events: Neuro evaln    REVIEW OF SYSTEMS:  Constitutional: No fevers or chills. No weight loss. + fatigue or generalized malaise.  Eyes: No itching or discharge from the eyes  ENT: No ear pain. No ear discharge. No nasal congestion. No post nasal drip. No epistaxis. No throat pain. No sore throat. No difficulty swallowing.   CV: No chest pain. No palpitations. No lightheadedness or dizziness.   Resp: No dyspnea at rest. No dyspnea on exertion. No orthopnea. No wheezing. + cough. No stridor. No sputum production. No chest pain with respiration.  GI: No nausea. No vomiting. No diarrhea.  MSK: No joint pain or pain in any extremities  Integumentary: No skin lesions. No pedal edema.  Neurological: No gross motor weakness. No sensory changes.  [+ ] All other systems negative  [ ] Unable to assess ROS because ________    OBJECTIVE:  ICU Vital Signs Last 24 Hrs  T(C): 37.1 (24 May 2018 04:43), Max: 37.3 (23 May 2018 20:20)  T(F): 98.8 (24 May 2018 04:43), Max: 99.2 (23 May 2018 20:20)  HR: 76 (24 May 2018 04:43) (68 - 100)  BP: 149/70 (24 May 2018 04:43) (149/70 - 172/78)  BP(mean): --  ABP: --  ABP(mean): --  RR: 18 (24 May 2018 04:43) (18 - 20)  SpO2: 96% (24 May 2018 04:43) (96% - 98%)         @ 07:  -  23 @ 07:00  --------------------------------------------------------  IN: 1010 mL / OUT: 700 mL / NET: 310 mL     @ 07:01  -   @ 05:07  --------------------------------------------------------  IN: 1050 mL / OUT: 0 mL / NET: 1050 mL      CAPILLARY BLOOD GLUCOSE      POCT Blood Glucose.: 309 mg/dL (23 May 2018 21:20)      PHYSICAL EXAM: NAD in bed  General: Awake, alert, oriented X 3.   HEENT: Atraumatic, normocephalic.                 Mallampatti Grade 3                No nasal congestion.                No tonsillar or pharyngeal exudates.  Lymph Nodes: No palpable lymphadenopathy  Neck: No JVD. No carotid bruit.   Respiratory: Normal chest expansion                         Normal percussion                         Normal and equal air entry                         No wheeze, rhonchi or rales.  Cardiovascular: S1 S2 normal. + murmurs, rubs or gallops.   Abdomen: Soft, non-tender, non-distended. No organomegaly.  Extremities: Warm to touch. Peripheral pulse palpable. trace pedal edema.   Skin: No rashes or skin lesions  Neurological: Motor and sensory examination equal and normal in all four extremities.  Psychiatry: Appropriate mood and affect.    HOSPITAL MEDICATIONS:  MEDICATIONS  (STANDING):  apixaban 5 milliGRAM(s) Oral every 12 hours  buDESOnide  80 MICROgram(s)/formoterol 4.5 MICROgram(s) Inhaler 2 Puff(s) Inhalation two times a day  dextrose 5%. 1000 milliLiter(s) (50 mL/Hr) IV Continuous <Continuous>  dextrose 50% Injectable 12.5 Gram(s) IV Push once  dextrose 50% Injectable 25 Gram(s) IV Push once  dextrose 50% Injectable 25 Gram(s) IV Push once  digoxin     Tablet 0.25 milliGRAM(s) Oral daily  insulin glargine Injectable (LANTUS) 12 Unit(s) SubCutaneous at bedtime  insulin lispro (HumaLOG) corrective regimen sliding scale   SubCutaneous three times a day before meals  insulin lispro (HumaLOG) corrective regimen sliding scale   SubCutaneous at bedtime  methylPREDNISolone 4 milliGRAM(s) Oral daily  montelukast 10 milliGRAM(s) Oral daily  OLANZapine 2.5 milliGRAM(s) Oral at bedtime  pantoprazole    Tablet 40 milliGRAM(s) Oral before breakfast  pregabalin 150 milliGRAM(s) Oral two times a day  tiotropium 18 MICROgram(s) Capsule 1 Capsule(s) Inhalation daily    MEDICATIONS  (PRN):  ALBUTerol    90 MICROgram(s) HFA Inhaler 1 Puff(s) Inhalation every 4 hours PRN Shortness of Breath and/or Wheezing  dextrose 40% Gel 15 Gram(s) Oral once PRN Blood Glucose LESS THAN 70 milliGRAM(s)/deciliter  glucagon  Injectable 1 milliGRAM(s) IntraMuscular once PRN Glucose LESS THAN 70 milligrams/deciliter  haloperidol    Injectable 1 milliGRAM(s) IV Push every 4 hours PRN agitation  loratadine 10 milliGRAM(s) Oral daily PRN allergies      LABS:                        12.5   4.5   )-----------( 194      ( 22 May 2018 21:33 )             38.6     -    141  |  102  |  17  ----------------------------<  282<H>  3.7   |  26  |  0.61    Ca    8.8      22 May 2018 21:33  Phos  2.0       Mg     2.1         TPro  7.0  /  Alb  3.7  /  TBili  0.2  /  DBili  x   /  AST  35  /  ALT  26  /  AlkPhos  82        Urinalysis Basic - ( 22 May 2018 22:03 )    Color: Yellow / Appearance: Clear / S.017 / pH: x  Gluc: x / Ketone: Negative  / Bili: Negative / Urobili: Negative   Blood: x / Protein: 100 mg/dL / Nitrite: Negative   Leuk Esterase: Negative / RBC: 2-5 /HPF / WBC 5-10 /HPF   Sq Epi: x / Non Sq Epi: x / Bacteria: x        Venous Blood Gas:   @ 21:29  7.43/43/33/28/60  VBG Lactate: 1.4      MICROBIOLOGY:     RADIOLOGY:  [ ] Reviewed and interpreted by me    Point of Care Ultrasound Findings:    PFT:    EKG:

## 2018-05-24 NOTE — PROGRESS NOTE ADULT - PROBLEM SELECTOR PLAN 3
-anorectal SCC dx in 2016, s/p 2 cycles chemotherapy/radiation (last 1 month ago)  -3T MRI machine unavailable, will need to do MRI as outpatient  -oncology recs appreciated

## 2018-05-24 NOTE — PROGRESS NOTE ADULT - PROVIDER SPECIALTY LIST ADULT
Heme/Onc
Internal Medicine
Pulmonology
Pulmonology
Internal Medicine
Pulmonology

## 2018-05-24 NOTE — PROGRESS NOTE BEHAVIORAL HEALTH - NSBHFUPINTERVALHXFT_PSY_A_CORE
Pt seen, chart reviewed. Medical team and RN both report pt doing much better, calm, no events overnight. Pt herself very mildly paranoid, checking writer's ID badge and appearing somewhat irritable at first, but then warmed up and was able to answer questions appropriately. When asked about paranoia, stated she had an "experience" with it recently, but it is gone now. Feels that dealing with her asthma, DM, cancer etc is a lot, but did not feel it was overwhelming her to the point of seeking psychiatric or psychological help. Reported everyone in hospital is treating her well.

## 2018-05-24 NOTE — PROGRESS NOTE ADULT - SUBJECTIVE AND OBJECTIVE BOX
Patient is a 67y old  Female who presents with a chief complaint of fever (21 May 2018 16:56)    SUBJECTIVE / OVERNIGHT EVENTS: No acute events overnight, pt. much calmer this morning on zyprexa, did not require haldol PRNs.     MEDICATIONS  (STANDING):  apixaban 5 milliGRAM(s) Oral every 12 hours  buDESOnide  80 MICROgram(s)/formoterol 4.5 MICROgram(s) Inhaler 2 Puff(s) Inhalation two times a day  dextrose 5%. 1000 milliLiter(s) (50 mL/Hr) IV Continuous <Continuous>  dextrose 50% Injectable 12.5 Gram(s) IV Push once  dextrose 50% Injectable 25 Gram(s) IV Push once  dextrose 50% Injectable 25 Gram(s) IV Push once  digoxin     Tablet 0.25 milliGRAM(s) Oral daily  insulin glargine Injectable (LANTUS) 12 Unit(s) SubCutaneous at bedtime  insulin lispro (HumaLOG) corrective regimen sliding scale   SubCutaneous three times a day before meals  insulin lispro (HumaLOG) corrective regimen sliding scale   SubCutaneous at bedtime  methylPREDNISolone 4 milliGRAM(s) Oral daily  montelukast 10 milliGRAM(s) Oral daily  OLANZapine 2.5 milliGRAM(s) Oral at bedtime  pantoprazole    Tablet 40 milliGRAM(s) Oral before breakfast  pregabalin 150 milliGRAM(s) Oral two times a day  tiotropium 18 MICROgram(s) Capsule 1 Capsule(s) Inhalation daily    MEDICATIONS  (PRN):  ALBUTerol    90 MICROgram(s) HFA Inhaler 1 Puff(s) Inhalation every 4 hours PRN Shortness of Breath and/or Wheezing  dextrose 40% Gel 15 Gram(s) Oral once PRN Blood Glucose LESS THAN 70 milliGRAM(s)/deciliter  glucagon  Injectable 1 milliGRAM(s) IntraMuscular once PRN Glucose LESS THAN 70 milligrams/deciliter  haloperidol    Injectable 1 milliGRAM(s) IV Push every 4 hours PRN agitation  loratadine 10 milliGRAM(s) Oral daily PRN allergies    Vital Signs Last 24 Hrs  T(C): 37.1 (24 May 2018 04:43), Max: 37.3 (23 May 2018 20:20)  T(F): 98.8 (24 May 2018 04:43), Max: 99.2 (23 May 2018 20:20)  HR: 76 (24 May 2018 04:43) (76 - 100)  BP: 149/70 (24 May 2018 04:43) (149/70 - 172/78)  RR: 18 (24 May 2018 04:43) (18 - 20)  SpO2: 96% (24 May 2018 04:43) (96% - 98%)  CAPILLARY BLOOD GLUCOSE    POCT Blood Glucose.: 172 mg/dL (24 May 2018 07:40)  POCT Blood Glucose.: 309 mg/dL (23 May 2018 21:20)  POCT Blood Glucose.: 271 mg/dL (23 May 2018 16:17)  POCT Blood Glucose.: 205 mg/dL (23 May 2018 11:56)    I&O's Summary    23 May 2018 07:01  -  24 May 2018 07:00  --------------------------------------------------------  IN: 1170 mL / OUT: 0 mL / NET: 1170 mL    24 May 2018 07:01  -  24 May 2018 09:06  --------------------------------------------------------  IN: 0 mL / OUT: 1 mL / NET: -1 mL    PHYSICAL EXAM:  GENERAL: NAD, well-developed  HEAD:  Atraumatic, Normocephalic  EYES: EOMI, PERRLA, conjunctiva and sclera clear, right eye erythema resolved   NECK: Supple, No JVD  CHEST/LUNG: Clear to auscultation bilaterally; No wheeze, right chest wall port   HEART: Regular rate and rhythm; No murmurs, rubs, or gallops  ABDOMEN: Soft, Nontender, Nondistended; Bowel sounds present  EXTREMITIES:  2+ Peripheral Pulses, No clubbing, cyanosis, or edema  PSYCH: AAOx3  NEUROLOGY: non-focal  SKIN: No rashes or lesions    LABS:                        11.8   5.73  )-----------( 227      ( 24 May 2018 07:56 )             36.9     05-24    144  |  103  |  14  ----------------------------<  179<H>  3.8   |  30  |  0.68    Ca    8.6      24 May 2018 06:23  Phos  2.9     05-  Mg     2.2     -24    TPro  7.0  /  Alb  3.7  /  TBili  0.2  /  DBili  x   /  AST  35  /  ALT  26  /  AlkPhos  82  05-    Urinalysis Basic - ( 22 May 2018 22:03 )    Color: Yellow / Appearance: Clear / S.017 / pH: x  Gluc: x / Ketone: Negative  / Bili: Negative / Urobili: Negative   Blood: x / Protein: 100 mg/dL / Nitrite: Negative   Leuk Esterase: Negative / RBC: 2-5 /HPF / WBC 5-10 /HPF   Sq Epi: x / Non Sq Epi: x / Bacteria: x    RADIOLOGY & ADDITIONAL TESTS:    Imaging Personally Reviewed: yes    Consultant(s) Notes Reviewed: yes

## 2018-05-24 NOTE — PROGRESS NOTE ADULT - PROBLEM SELECTOR PLAN 1
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

## 2018-05-24 NOTE — PROGRESS NOTE ADULT - ASSESSMENT
68 yo Asthma, TBM-s/p tracheoplasty, gerd, allergic rhinits, frequent infections and advanced colon cancer--admit w/  fevers.    Fevers resolved--await of MRI of hip; CT head wnl 66 yo Asthma, TBM-s/p tracheoplasty, gerd, allergic rhinits, frequent infections and advanced colon cancer--admit w/  fevers.    Fevers resolved--await of MRI of hip; CT head wnl  MS improved--unclear etiology (doubt steroid psychosis here)

## 2018-05-24 NOTE — PROGRESS NOTE ADULT - ATTENDING COMMENTS
as above--unclear etiology of temps--doubt PNA--clinically the best that she has been  continue inhaler regimen/acapella etc  *****ID evaluation to tailor abx (completing D5 of 5 empiric rx)                        Consider Sp and sw evaln  *****Neuro evaln pre DC (MS issues)    Eulalio Allison MD-Pulmonary   [phone] as above--unclear etiology of temps--doubt PNA--clinically the best that she has been  continue inhaler regimen/acapella etc  *****ID evaluation to tailor abx (completed D5 of 5 empiric rx)                        Consider Sp and sw evaln  *****Neuro evaln pre DC (MS issues-doubt steroid psychosis)-will need evaln by Psych etc to facilitate DC home    Eulalio Allison MD-Pulmonary   [phone]

## 2018-05-24 NOTE — PROGRESS NOTE ADULT - PROBLEM SELECTOR PLAN 6
-saturating 95% on RA with productive cough  -c/w albuterol, spiriva, montelukast, breo not on formulary will do symbicort  -pulmonology recommendations aprpeciated

## 2018-05-24 NOTE — PROGRESS NOTE ADULT - PROBLEM SELECTOR PLAN 5
-unclear etiology  -s/p stress dose steroids in the setting of sepsis, c/w home regimen methylprednisolone 4mg daily, pantoprazole for GI ppx 40mg daily

## 2018-05-24 NOTE — PROGRESS NOTE ADULT - PROBLEM SELECTOR PLAN 7
-A1C 7.8  -c/w home lantus 12 units at bedtime, hold premeal 7units TID for now, c/w ISS before meals and at bedtime

## 2018-05-24 NOTE — PROGRESS NOTE ADULT - PROBLEM SELECTOR PLAN 1
unclear etiology--no significant evidence for PNA inspite of CT  ID evaluation needed--on cefepime D5/5 unclear etiology--no significant evidence for PNA inspite of CT  ID evaluation needed--completed cefepime D5/5

## 2018-05-24 NOTE — PROGRESS NOTE ADULT - PROBLEM SELECTOR PLAN 2
-resolved, on admission febrile, tachycardic, WBC of 5, ANC 3200  -possibly 2/2 lung source in the setting of chronic bronchitis however CT chest negative for PNA, CTAP (-), RVP/CT head/CXR negative, has pelvic/knee replacements (joints benign on PE), mediport site non-concerning  -BCx NTD, UCx negative, d/c vancomycin and flagyl, s/p cefepime 5 days, tapering stress dose steroids in the setting of adrenal insufficiency -resolved, on admission febrile, tachycardic, WBC of 5, ANC 3200  -possibly 2/2 chronic bronchitis, CT chest negative for PNA, CTAP (-), RVP/CT head/CXR negative, has pelvic/knee replacements (joints benign on PE), mediport site non-concerning  -BCx NTD, UCx negative, d/c vancomycin and flagyl, s/p cefepime 5 days, tapering stress dose steroids in the setting of adrenal insufficiency

## 2018-05-24 NOTE — PROGRESS NOTE ADULT - PROBLEM SELECTOR PLAN 9
DVT ppx: therapeutically anticoagulated with Eliquis  Diet: Carbohydrate restricted  Dispo: home with PT and rolling walker
DVT ppx: therapeutically anticoagulated with Eliquis  Diet: Carbohydrate restricted  Dispo: home with PT and rolling walker

## 2018-05-25 ENCOUNTER — APPOINTMENT (OUTPATIENT)
Dept: INFUSION THERAPY | Facility: HOSPITAL | Age: 68
End: 2018-05-25

## 2018-05-25 ENCOUNTER — APPOINTMENT (OUTPATIENT)
Dept: COLORECTAL SURGERY | Facility: CLINIC | Age: 68
End: 2018-05-25

## 2018-05-28 ENCOUNTER — FORM ENCOUNTER (OUTPATIENT)
Age: 68
End: 2018-05-28

## 2018-05-29 ENCOUNTER — APPOINTMENT (OUTPATIENT)
Dept: RADIATION ONCOLOGY | Facility: CLINIC | Age: 68
End: 2018-05-29

## 2018-05-29 ENCOUNTER — OUTPATIENT (OUTPATIENT)
Dept: OUTPATIENT SERVICES | Facility: HOSPITAL | Age: 68
LOS: 1 days | End: 2018-05-29
Payer: MEDICARE

## 2018-05-29 ENCOUNTER — APPOINTMENT (OUTPATIENT)
Dept: HEMATOLOGY ONCOLOGY | Facility: CLINIC | Age: 68
End: 2018-05-29
Payer: MEDICARE

## 2018-05-29 ENCOUNTER — APPOINTMENT (OUTPATIENT)
Dept: MRI IMAGING | Facility: IMAGING CENTER | Age: 68
End: 2018-05-29
Payer: MEDICARE

## 2018-05-29 VITALS
SYSTOLIC BLOOD PRESSURE: 111 MMHG | WEIGHT: 153.88 LBS | OXYGEN SATURATION: 99 % | HEART RATE: 78 BPM | BODY MASS INDEX: 24.1 KG/M2 | DIASTOLIC BLOOD PRESSURE: 72 MMHG | TEMPERATURE: 98.3 F | RESPIRATION RATE: 16 BRPM

## 2018-05-29 DIAGNOSIS — Z98.89 OTHER SPECIFIED POSTPROCEDURAL STATES: Chronic | ICD-10-CM

## 2018-05-29 DIAGNOSIS — H05.352 EXOSTOSIS OF LEFT ORBIT: Chronic | ICD-10-CM

## 2018-05-29 DIAGNOSIS — Z96.653 PRESENCE OF ARTIFICIAL KNEE JOINT, BILATERAL: Chronic | ICD-10-CM

## 2018-05-29 DIAGNOSIS — C21.1 MALIGNANT NEOPLASM OF ANAL CANAL: ICD-10-CM

## 2018-05-29 PROCEDURE — 72197 MRI PELVIS W/O & W/DYE: CPT | Mod: 26

## 2018-05-29 PROCEDURE — 72197 MRI PELVIS W/O & W/DYE: CPT

## 2018-05-29 PROCEDURE — A9585: CPT

## 2018-05-29 PROCEDURE — 99215 OFFICE O/P EST HI 40 MIN: CPT

## 2018-05-30 ENCOUNTER — APPOINTMENT (OUTPATIENT)
Dept: CARDIOTHORACIC SURGERY | Facility: CLINIC | Age: 68
End: 2018-05-30

## 2018-05-30 ENCOUNTER — APPOINTMENT (OUTPATIENT)
Dept: COLORECTAL SURGERY | Facility: CLINIC | Age: 68
End: 2018-05-30
Payer: MEDICARE

## 2018-05-30 PROCEDURE — 45300 PROCTOSIGMOIDOSCOPY DX: CPT

## 2018-05-30 PROCEDURE — 99212 OFFICE O/P EST SF 10 MIN: CPT | Mod: 25

## 2018-06-01 ENCOUNTER — APPOINTMENT (OUTPATIENT)
Dept: RADIATION ONCOLOGY | Facility: CLINIC | Age: 68
End: 2018-06-01

## 2018-06-04 ENCOUNTER — NON-APPOINTMENT (OUTPATIENT)
Age: 68
End: 2018-06-04

## 2018-06-04 ENCOUNTER — APPOINTMENT (OUTPATIENT)
Dept: CARDIOTHORACIC SURGERY | Facility: CLINIC | Age: 68
End: 2018-06-04
Payer: MEDICARE

## 2018-06-04 ENCOUNTER — FORM ENCOUNTER (OUTPATIENT)
Age: 68
End: 2018-06-04

## 2018-06-04 VITALS
OXYGEN SATURATION: 100 % | WEIGHT: 151.9 LBS | HEART RATE: 66 BPM | HEIGHT: 67.5 IN | RESPIRATION RATE: 16 BRPM | SYSTOLIC BLOOD PRESSURE: 124 MMHG | DIASTOLIC BLOOD PRESSURE: 58 MMHG | TEMPERATURE: 96 F

## 2018-06-04 VITALS
OXYGEN SATURATION: 9 % | RESPIRATION RATE: 16 BRPM | HEIGHT: 67 IN | WEIGHT: 155 LBS | HEART RATE: 74 BPM | SYSTOLIC BLOOD PRESSURE: 158 MMHG | DIASTOLIC BLOOD PRESSURE: 78 MMHG | TEMPERATURE: 97.9 F | BODY MASS INDEX: 24.33 KG/M2

## 2018-06-04 VITALS — DIASTOLIC BLOOD PRESSURE: 73 MMHG | SYSTOLIC BLOOD PRESSURE: 151 MMHG

## 2018-06-04 PROCEDURE — 99215 OFFICE O/P EST HI 40 MIN: CPT | Mod: 25

## 2018-06-04 PROCEDURE — 93000 ELECTROCARDIOGRAM COMPLETE: CPT

## 2018-06-04 RX ORDER — VALSARTAN 40 MG/1
40 TABLET, COATED ORAL
Qty: 90 | Refills: 3 | Status: DISCONTINUED | COMMUNITY
Start: 2017-12-08 | End: 2018-06-04

## 2018-06-04 RX ORDER — VALSARTAN 40 MG/1
40 TABLET ORAL DAILY
Refills: 0 | Status: DISCONTINUED | COMMUNITY
Start: 2018-05-04 | End: 2018-06-04

## 2018-06-04 NOTE — ASU PATIENT PROFILE, ADULT - PSH
Exostosis of orbit, left  left eye prosthetic  H/O pelvic surgery    H/O total knee replacement, bilateral    History of partial hysterectomy    History of sinus surgery Exostosis of orbit, left  left eye prosthetic  H/O pelvic surgery    H/O total knee replacement, bilateral    History of partial hysterectomy    History of sinus surgery    History of surgery  tracheo thermoplasty 2016

## 2018-06-04 NOTE — ASU PATIENT PROFILE, ADULT - NS PRO AD PATIENT TYPE
Zoe Flores [phone]/Health Care Proxy (HCP) Health Care Proxy (HCP)/daughter Zoe Flores [phone]

## 2018-06-05 ENCOUNTER — OUTPATIENT (OUTPATIENT)
Dept: OUTPATIENT SERVICES | Facility: HOSPITAL | Age: 68
LOS: 1 days | Discharge: HOME | End: 2018-06-05
Payer: MEDICARE

## 2018-06-05 ENCOUNTER — APPOINTMENT (OUTPATIENT)
Dept: THORACIC SURGERY | Facility: HOSPITAL | Age: 68
End: 2018-06-05

## 2018-06-05 VITALS
OXYGEN SATURATION: 95 % | TEMPERATURE: 98 F | DIASTOLIC BLOOD PRESSURE: 56 MMHG | SYSTOLIC BLOOD PRESSURE: 120 MMHG | RESPIRATION RATE: 15 BRPM | HEART RATE: 58 BPM

## 2018-06-05 DIAGNOSIS — Z98.89 OTHER SPECIFIED POSTPROCEDURAL STATES: Chronic | ICD-10-CM

## 2018-06-05 DIAGNOSIS — H05.352 EXOSTOSIS OF LEFT ORBIT: Chronic | ICD-10-CM

## 2018-06-05 DIAGNOSIS — Z98.890 OTHER SPECIFIED POSTPROCEDURAL STATES: Chronic | ICD-10-CM

## 2018-06-05 DIAGNOSIS — Z96.653 PRESENCE OF ARTIFICIAL KNEE JOINT, BILATERAL: Chronic | ICD-10-CM

## 2018-06-05 LAB — GLUCOSE BLDC GLUCOMTR-MCNC: 134 MG/DL — HIGH (ref 70–99)

## 2018-06-05 PROCEDURE — 31622 DX BRONCHOSCOPE/WASH: CPT

## 2018-06-05 PROCEDURE — 71045 X-RAY EXAM CHEST 1 VIEW: CPT

## 2018-06-05 PROCEDURE — 82962 GLUCOSE BLOOD TEST: CPT

## 2018-06-05 PROCEDURE — 71045 X-RAY EXAM CHEST 1 VIEW: CPT | Mod: 26

## 2018-06-19 ENCOUNTER — RX RENEWAL (OUTPATIENT)
Age: 68
End: 2018-06-19

## 2018-06-20 ENCOUNTER — APPOINTMENT (OUTPATIENT)
Dept: PULMONOLOGY | Facility: CLINIC | Age: 68
End: 2018-06-20
Payer: MEDICARE

## 2018-06-20 VITALS
OXYGEN SATURATION: 97 % | RESPIRATION RATE: 16 BRPM | DIASTOLIC BLOOD PRESSURE: 70 MMHG | HEART RATE: 71 BPM | SYSTOLIC BLOOD PRESSURE: 130 MMHG | BODY MASS INDEX: 23.54 KG/M2 | HEIGHT: 67 IN | WEIGHT: 150 LBS

## 2018-06-20 PROCEDURE — 99214 OFFICE O/P EST MOD 30 MIN: CPT | Mod: 25

## 2018-06-20 PROCEDURE — 96372 THER/PROPH/DIAG INJ SC/IM: CPT

## 2018-06-20 PROCEDURE — 71046 X-RAY EXAM CHEST 2 VIEWS: CPT

## 2018-06-20 PROCEDURE — 94010 BREATHING CAPACITY TEST: CPT

## 2018-06-20 RX ORDER — OMALIZUMAB 202.5 MG/1.4ML
150 INJECTION, SOLUTION SUBCUTANEOUS
Qty: 45 | Refills: 0 | Status: COMPLETED | OUTPATIENT
Start: 2018-06-20

## 2018-06-20 RX ADMIN — Medication 0 MG: at 00:00

## 2018-06-22 ENCOUNTER — MEDICATION RENEWAL (OUTPATIENT)
Age: 68
End: 2018-06-22

## 2018-06-26 ENCOUNTER — APPOINTMENT (OUTPATIENT)
Dept: HEART AND VASCULAR | Facility: CLINIC | Age: 68
End: 2018-06-26

## 2018-06-29 ENCOUNTER — APPOINTMENT (OUTPATIENT)
Dept: COLORECTAL SURGERY | Facility: CLINIC | Age: 68
End: 2018-06-29
Payer: MEDICARE

## 2018-06-29 PROCEDURE — 46614 ANOSCOPY CONTROL BLEEDING: CPT

## 2018-06-29 PROCEDURE — 99213 OFFICE O/P EST LOW 20 MIN: CPT | Mod: 25

## 2018-07-02 ENCOUNTER — RX RENEWAL (OUTPATIENT)
Age: 68
End: 2018-07-02

## 2018-07-04 ENCOUNTER — FORM ENCOUNTER (OUTPATIENT)
Age: 68
End: 2018-07-04

## 2018-07-05 ENCOUNTER — OUTPATIENT (OUTPATIENT)
Dept: OUTPATIENT SERVICES | Facility: HOSPITAL | Age: 68
LOS: 1 days | End: 2018-07-05
Payer: MEDICARE

## 2018-07-05 ENCOUNTER — APPOINTMENT (OUTPATIENT)
Dept: HEMATOLOGY ONCOLOGY | Facility: CLINIC | Age: 68
End: 2018-07-05

## 2018-07-05 ENCOUNTER — APPOINTMENT (OUTPATIENT)
Dept: MRI IMAGING | Facility: IMAGING CENTER | Age: 68
End: 2018-07-05
Payer: MEDICARE

## 2018-07-05 ENCOUNTER — APPOINTMENT (OUTPATIENT)
Dept: PULMONOLOGY | Facility: CLINIC | Age: 68
End: 2018-07-05
Payer: MEDICARE

## 2018-07-05 VITALS
BODY MASS INDEX: 24.33 KG/M2 | SYSTOLIC BLOOD PRESSURE: 130 MMHG | HEART RATE: 74 BPM | RESPIRATION RATE: 17 BRPM | HEIGHT: 67 IN | OXYGEN SATURATION: 95 % | DIASTOLIC BLOOD PRESSURE: 74 MMHG | WEIGHT: 155 LBS

## 2018-07-05 DIAGNOSIS — C21.1 MALIGNANT NEOPLASM OF ANAL CANAL: ICD-10-CM

## 2018-07-05 DIAGNOSIS — Z98.89 OTHER SPECIFIED POSTPROCEDURAL STATES: Chronic | ICD-10-CM

## 2018-07-05 DIAGNOSIS — Z96.653 PRESENCE OF ARTIFICIAL KNEE JOINT, BILATERAL: Chronic | ICD-10-CM

## 2018-07-05 DIAGNOSIS — Z98.890 OTHER SPECIFIED POSTPROCEDURAL STATES: Chronic | ICD-10-CM

## 2018-07-05 DIAGNOSIS — H05.352 EXOSTOSIS OF LEFT ORBIT: Chronic | ICD-10-CM

## 2018-07-05 PROCEDURE — 72197 MRI PELVIS W/O & W/DYE: CPT

## 2018-07-05 PROCEDURE — 82565 ASSAY OF CREATININE: CPT

## 2018-07-05 PROCEDURE — A9585: CPT

## 2018-07-05 PROCEDURE — 96372 THER/PROPH/DIAG INJ SC/IM: CPT

## 2018-07-05 PROCEDURE — 72197 MRI PELVIS W/O & W/DYE: CPT | Mod: 26

## 2018-07-05 RX ORDER — OMALIZUMAB 202.5 MG/1.4ML
150 INJECTION, SOLUTION SUBCUTANEOUS
Qty: 45 | Refills: 0 | Status: COMPLETED | OUTPATIENT
Start: 2018-07-05

## 2018-07-05 RX ADMIN — Medication 0 MG: at 00:00

## 2018-07-10 ENCOUNTER — APPOINTMENT (OUTPATIENT)
Dept: HEART AND VASCULAR | Facility: CLINIC | Age: 68
End: 2018-07-10
Payer: MEDICARE

## 2018-07-10 VITALS
WEIGHT: 154 LBS | TEMPERATURE: 98.4 F | BODY MASS INDEX: 24.17 KG/M2 | RESPIRATION RATE: 14 BRPM | DIASTOLIC BLOOD PRESSURE: 80 MMHG | HEIGHT: 67 IN | OXYGEN SATURATION: 95 % | SYSTOLIC BLOOD PRESSURE: 160 MMHG | HEART RATE: 83 BPM

## 2018-07-10 PROCEDURE — 99215 OFFICE O/P EST HI 40 MIN: CPT

## 2018-07-11 ENCOUNTER — APPOINTMENT (OUTPATIENT)
Dept: COLORECTAL SURGERY | Facility: CLINIC | Age: 68
End: 2018-07-11
Payer: MEDICARE

## 2018-07-11 PROCEDURE — 99214 OFFICE O/P EST MOD 30 MIN: CPT

## 2018-07-13 RX ORDER — SULFAMETHOXAZOLE AND TRIMETHOPRIM 800; 160 MG/1; MG/1
800-160 TABLET ORAL TWICE DAILY
Qty: 14 | Refills: 0 | Status: DISCONTINUED | COMMUNITY
Start: 2018-06-07 | End: 2018-07-13

## 2018-07-17 ENCOUNTER — APPOINTMENT (OUTPATIENT)
Dept: PULMONOLOGY | Facility: CLINIC | Age: 68
End: 2018-07-17
Payer: MEDICARE

## 2018-07-17 ENCOUNTER — APPOINTMENT (OUTPATIENT)
Dept: CV DIAGNOSTICS | Facility: HOSPITAL | Age: 68
End: 2018-07-17

## 2018-07-17 PROBLEM — R56.9 UNSPECIFIED CONVULSIONS: Chronic | Status: ACTIVE | Noted: 2018-01-31

## 2018-07-17 PROBLEM — K62.5 HEMORRHAGE OF ANUS AND RECTUM: Chronic | Status: ACTIVE | Noted: 2018-01-31

## 2018-07-17 PROCEDURE — 94729 DIFFUSING CAPACITY: CPT

## 2018-07-17 PROCEDURE — 94060 EVALUATION OF WHEEZING: CPT

## 2018-07-17 PROCEDURE — 96372 THER/PROPH/DIAG INJ SC/IM: CPT | Mod: 59

## 2018-07-17 PROCEDURE — 94727 GAS DIL/WSHOT DETER LNG VOL: CPT

## 2018-07-18 ENCOUNTER — OUTPATIENT (OUTPATIENT)
Dept: OUTPATIENT SERVICES | Facility: HOSPITAL | Age: 68
LOS: 1 days | End: 2018-07-18
Payer: MEDICARE

## 2018-07-18 VITALS
TEMPERATURE: 98 F | OXYGEN SATURATION: 96 % | SYSTOLIC BLOOD PRESSURE: 124 MMHG | WEIGHT: 154.98 LBS | HEART RATE: 75 BPM | HEIGHT: 67.5 IN | RESPIRATION RATE: 15 BRPM | DIASTOLIC BLOOD PRESSURE: 79 MMHG

## 2018-07-18 DIAGNOSIS — K62.5 HEMORRHAGE OF ANUS AND RECTUM: Chronic | ICD-10-CM

## 2018-07-18 DIAGNOSIS — E11.9 TYPE 2 DIABETES MELLITUS WITHOUT COMPLICATIONS: ICD-10-CM

## 2018-07-18 DIAGNOSIS — Z98.890 OTHER SPECIFIED POSTPROCEDURAL STATES: Chronic | ICD-10-CM

## 2018-07-18 DIAGNOSIS — J45.909 UNSPECIFIED ASTHMA, UNCOMPLICATED: ICD-10-CM

## 2018-07-18 DIAGNOSIS — Z87.09 PERSONAL HISTORY OF OTHER DISEASES OF THE RESPIRATORY SYSTEM: Chronic | ICD-10-CM

## 2018-07-18 DIAGNOSIS — C21.1 MALIGNANT NEOPLASM OF ANAL CANAL: ICD-10-CM

## 2018-07-18 DIAGNOSIS — I48.91 UNSPECIFIED ATRIAL FIBRILLATION: ICD-10-CM

## 2018-07-18 DIAGNOSIS — Z29.9 ENCOUNTER FOR PROPHYLACTIC MEASURES, UNSPECIFIED: ICD-10-CM

## 2018-07-18 DIAGNOSIS — Z98.89 OTHER SPECIFIED POSTPROCEDURAL STATES: Chronic | ICD-10-CM

## 2018-07-18 DIAGNOSIS — H05.352 EXOSTOSIS OF LEFT ORBIT: Chronic | ICD-10-CM

## 2018-07-18 DIAGNOSIS — Z01.818 ENCOUNTER FOR OTHER PREPROCEDURAL EXAMINATION: ICD-10-CM

## 2018-07-18 DIAGNOSIS — E27.40 UNSPECIFIED ADRENOCORTICAL INSUFFICIENCY: ICD-10-CM

## 2018-07-18 DIAGNOSIS — Z96.653 PRESENCE OF ARTIFICIAL KNEE JOINT, BILATERAL: Chronic | ICD-10-CM

## 2018-07-18 LAB
ALBUMIN SERPL ELPH-MCNC: 4.3 G/DL — SIGNIFICANT CHANGE UP (ref 3.3–5)
ALP SERPL-CCNC: 122 U/L — HIGH (ref 40–120)
ALT FLD-CCNC: 8 U/L — LOW (ref 10–45)
ANION GAP SERPL CALC-SCNC: 11 MMOL/L — SIGNIFICANT CHANGE UP (ref 5–17)
AST SERPL-CCNC: 14 U/L — SIGNIFICANT CHANGE UP (ref 10–40)
BILIRUB SERPL-MCNC: 0.2 MG/DL — SIGNIFICANT CHANGE UP (ref 0.2–1.2)
BLD GP AB SCN SERPL QL: NEGATIVE — SIGNIFICANT CHANGE UP
BUN SERPL-MCNC: 13 MG/DL — SIGNIFICANT CHANGE UP (ref 7–23)
CALCIUM SERPL-MCNC: 9.5 MG/DL — SIGNIFICANT CHANGE UP (ref 8.4–10.5)
CHLORIDE SERPL-SCNC: 103 MMOL/L — SIGNIFICANT CHANGE UP (ref 96–108)
CO2 SERPL-SCNC: 26 MMOL/L — SIGNIFICANT CHANGE UP (ref 22–31)
CREAT SERPL-MCNC: 0.78 MG/DL — SIGNIFICANT CHANGE UP (ref 0.5–1.3)
GLUCOSE SERPL-MCNC: 111 MG/DL — HIGH (ref 70–99)
HBA1C BLD-MCNC: 7.4 % — HIGH (ref 4–5.6)
HCT VFR BLD CALC: 42.7 % — SIGNIFICANT CHANGE UP (ref 34.5–45)
HGB BLD-MCNC: 13.1 G/DL — SIGNIFICANT CHANGE UP (ref 11.5–15.5)
MCHC RBC-ENTMCNC: 22.9 PG — LOW (ref 27–34)
MCHC RBC-ENTMCNC: 30.7 GM/DL — LOW (ref 32–36)
MCV RBC AUTO: 74.8 FL — LOW (ref 80–100)
PLATELET # BLD AUTO: 272 K/UL — SIGNIFICANT CHANGE UP (ref 150–400)
POTASSIUM SERPL-MCNC: 4.4 MMOL/L — SIGNIFICANT CHANGE UP (ref 3.5–5.3)
POTASSIUM SERPL-SCNC: 4.4 MMOL/L — SIGNIFICANT CHANGE UP (ref 3.5–5.3)
PROT SERPL-MCNC: 7.9 G/DL — SIGNIFICANT CHANGE UP (ref 6–8.3)
RBC # BLD: 5.71 M/UL — HIGH (ref 3.8–5.2)
RBC # FLD: 17.3 % — HIGH (ref 10.3–14.5)
RH IG SCN BLD-IMP: POSITIVE — SIGNIFICANT CHANGE UP
SODIUM SERPL-SCNC: 140 MMOL/L — SIGNIFICANT CHANGE UP (ref 135–145)
WBC # BLD: 8.27 K/UL — SIGNIFICANT CHANGE UP (ref 3.8–10.5)
WBC # FLD AUTO: 8.27 K/UL — SIGNIFICANT CHANGE UP (ref 3.8–10.5)

## 2018-07-18 PROCEDURE — 85027 COMPLETE CBC AUTOMATED: CPT

## 2018-07-18 PROCEDURE — 80053 COMPREHEN METABOLIC PANEL: CPT

## 2018-07-18 PROCEDURE — 83036 HEMOGLOBIN GLYCOSYLATED A1C: CPT

## 2018-07-18 PROCEDURE — 86901 BLOOD TYPING SEROLOGIC RH(D): CPT

## 2018-07-18 PROCEDURE — 86850 RBC ANTIBODY SCREEN: CPT

## 2018-07-18 PROCEDURE — 86900 BLOOD TYPING SEROLOGIC ABO: CPT

## 2018-07-18 PROCEDURE — G0463: CPT

## 2018-07-18 RX ORDER — INSULIN ASPART 100 [IU]/ML
7 INJECTION, SOLUTION SUBCUTANEOUS
Qty: 0 | Refills: 0 | COMMUNITY

## 2018-07-18 RX ORDER — AZTREONAM 2 G
2000 VIAL (EA) INJECTION ONCE
Qty: 0 | Refills: 0 | Status: DISCONTINUED | OUTPATIENT
Start: 2018-07-24 | End: 2018-07-26

## 2018-07-18 RX ORDER — VALSARTAN 80 MG/1
1 TABLET ORAL
Qty: 0 | Refills: 0 | COMMUNITY

## 2018-07-18 RX ADMIN — OMALIZUMAB 0 MG: 202.5 INJECTION, SOLUTION SUBCUTANEOUS at 00:00

## 2018-07-18 NOTE — H&P PST ADULT - NS MD HP INPLANTS MED DEV
Artificial joint bilateral knees, titanium hardware - pelvis, left eye prosthetic eye(glass), tracheal mesh/Artificial joint bilateral knees, titanium hardware - pelvis, left eye prosthetic eye(glass), Prolene Mesh - trachea/Artificial joint

## 2018-07-18 NOTE — H&P PST ADULT - OTHER CARE PROVIDERS
Dr [phone], Dr [phone], Dr [phone] Dr Leahy Cardiologist [phone], Dr Allison Pulmonologist [phone], Dr Browne Neurologist [phone]; Dr Zach Portillo Oncologist [phone] Dr Leahy Cardiologist [phone], Dr Allison Pulmonologist [phone], Dr Browne Neurologist [phone]; Dr Zach Portillo Oncologist [phone]; Dr Zohaib Zapien [phone]

## 2018-07-18 NOTE — H&P PST ADULT - PMH
Adrenal insufficiency    Aortic disease  leaky valve  Aortic insufficiency  mod/severe AI on echo 9/25/17  Asthma    Atrial fibrillation    Colorectal cancer  7/2017- last treatment , chemo and radiation  COPD (chronic obstructive pulmonary disease)    Diabetes  type two. insulin dependent  Pelvic fracture    Rectal bleeding    Seizure  x 1 1/7/18  Tracheobronchomalacia Adrenal insufficiency  Medrol daily for over 50 years  Aortic disease  leaky valve  Aortic insufficiency  mod/severe AI on echo 9/25/17  Asthma    Atrial fibrillation  paroxysmal  Colorectal cancer  4/2018- last treatment , chemo and radiation  COPD (chronic obstructive pulmonary disease)    Diabetes  Type 2 , Insulin dependent  DVT (deep venous thrombosis)  15-20 years ago, took coumadin  Pelvic fracture    Rectal bleeding    Seizure  x 1 1/7/18  TIA (transient ischemic attack)  multiple, last 5 years ago - presents as right-sided weakness  Tracheobronchomalacia  diagnosed 2015 Adrenal insufficiency  Medrol daily for over 50 years  Aortic insufficiency  mod/severe AI on echo 9/25/17  Asthma    Atrial fibrillation  paroxysmal, on eliquis  Colorectal cancer  4/2018- last treatment , chemo and radiation  COPD (chronic obstructive pulmonary disease)    Diabetes  Type 2 , Insulin dependent  DVT (deep venous thrombosis)  15-20 years ago, took coumadin  Pelvic fracture    Rectal bleeding    Seizure  x 1 1/7/18  TIA (transient ischemic attack)  multiple, last 5 years ago - presents as right-sided weakness  Tracheobronchomalacia  diagnosed 2015 Adrenal insufficiency  Medrol daily for over 50 years  Aortic insufficiency  moderate AR on echo 5/3/2018  Asthma    Atrial fibrillation  paroxysmal, on eliquis  Colorectal cancer  4/2018- last treatment , chemo and radiation  COPD (chronic obstructive pulmonary disease)    Diabetes  Type 2  DVT (deep venous thrombosis)  15-20 years ago, took coumadin  Pelvic fracture    Rectal bleeding    Seizure  x 1 1/7/18  TIA (transient ischemic attack)  multiple, last 5 years ago - presents as right-sided weakness  Tracheobronchomalacia  diagnosed 2015 Adrenal insufficiency  Medrol daily for over 50 years  Aortic insufficiency  moderate AR on echo 5/3/2018  Asthma    Atrial fibrillation  paroxysmal, on eliquis  Colorectal cancer  4/2018- last treatment , chemo and radiation  COPD (chronic obstructive pulmonary disease)    Diabetes  Type 2  DVT (deep venous thrombosis)  15-20 years ago, took coumadin  Pelvic fracture    Rectal bleeding    Seizure  x 1 1/7/18  TIA (transient ischemic attack)  multiple, last 5 years ago - presents as right-sided weakness  Tracheobronchomalacia  diagnosed 2015, s/p bronchial thermoplasty 2016 (Dr Zapien); recent bronchoscopy 6/5/2018 revealed no evidence of tracheobronchomalacia in trachea or bronchial tubes

## 2018-07-18 NOTE — H&P PST ADULT - PSH
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  H/O pelvic surgery  5 years ago - s/p fracture  H/O total knee replacement, bilateral  5 years ago  History of partial hysterectomy  30 years ago - fibroids  History of sinus surgery  multiple sinus surgeries  History of tracheomalacia  2015 - attempted tracheal stenting which failed, stent removed; 2016 Bronchial Thermoplasty 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  H/O pelvic surgery  5 years ago - s/p fracture  H/O total knee replacement, bilateral  5 years ago  History of partial hysterectomy  30 years ago - fibroids  History of sinus surgery  multiple sinus surgeries  History of tracheomalacia  2015 - attempted tracheal stenting which failed, stent removed; 2016 Bronchial Thermoplasty (Dr Zapien) 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  H/O pelvic surgery  5 years ago - s/p fracture  H/O total knee replacement, bilateral  5 years ago  History of partial hysterectomy  30 years ago - fibroids  History of sinus surgery  multiple sinus surgeries  History of tracheomalacia  2015 - attempted tracheal stenting which failed, stent removed; 2016 Bronchial Thermoplasty (Dr Zapien)  Rectal bleeding  exam under anesthesia ([facility]) 2/2018  S/P bronchoscopy  6/5/2018 - [place] (Dr Zapien) no evidence of tracheobronchomalacia in trachea or bronchial tubes 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  H/O pelvic surgery  5 years ago - s/p fracture  H/O total knee replacement, bilateral  5 years ago  History of partial hysterectomy  30 years ago - fibroids  History of sinus surgery  multiple sinus surgeries  History of tracheomalacia  2015 - attempted tracheal stenting ([facility])- course complicated by obstruction, respiratory failure, multiple CPR attempts -  stent discontinued; 10/20/2016 Tracheobronchoplasty (Prolene Mesh) performed at [facility] by Dr Zapien  Rectal bleeding  exam under anesthesia ([facility]) 2/2018  S/P bronchoscopy  6/5/2018 - [place] (Dr Zapien) no evidence of tracheobronchomalacia in trachea or bronchial tubes

## 2018-07-18 NOTE — H&P PST ADULT - PROBLEM SELECTOR PLAN 1
APR  Heparin 5000 units subcutaneous once preoperatively APR  Heparin 5000 units subcutaneous once preoperatively  Pt has multiple antibiotic allergies, including PCN and Avelox. Discussed preop prophylactic antibiotics with Pharmacy, who recommended Clindamycin and Aztreonam for PCN and Fluoroquinolone allergic patient with colorectal surgery.

## 2018-07-18 NOTE — H&P PST ADULT - PROBLEM SELECTOR PLAN 2
Per pt's PCP, pt should hold eliquis 48-72 hours preoperatively  Per , recommendation assuming high bleeding risk procedure and medium thromboembolic risk (AF with CHADs score =4), eliquis should be held 3 days before procedure. Pt's last dose of eliquis will be 7/20/18 pm.

## 2018-07-18 NOTE — H&P PST ADULT - VENOUS THROMBOEMBOLISM CURRENT STATUS
present cancer or chemotherapy/minor surgery planned major surgery lasting over 3 hrs/present cancer or chemotherapy/abnormal pulmonary function (COPD)

## 2018-07-18 NOTE — H&P PST ADULT - ALLERGY TYPES
indoor environmental allergies/reactions to medicines/reactions to food/outdoor environmental allergies

## 2018-07-18 NOTE — H&P PST ADULT - PROBLEM SELECTOR PLAN 3
Pt instructed to hold novolog and check FS on am of surgery  Stat FS on admission  Pt will reduce lantus dose to 80% of usual dose on evening prior to surgery

## 2018-07-18 NOTE — H&P PST ADULT - ASSESSMENT
1.	Malignant Neoplasm of Anal Canal  2.	Atrial Fibrillation  3.	Type 2 DM  4.	Asthma  5.	Adrenal Insufficiency  CAPRINI SCORE    AGE RELATED RISK FACTORS                                                       MOBILITY RELATED FACTORS  [ ] Age 41-60 years                                            (1 Point)                  [ ] Bed rest                                                        (1 Point)  [ x] Age: 61-74 years                                           (2 Points)                [ ] Plaster cast                                                   (2 Points)  [ ] Age= 75 years                                              (3 Points)                 [ ] Bed bound for more than 72 hours                   (2 Points)    DISEASE RELATED RISK FACTORS                                               GENDER SPECIFIC FACTORS  [ ] Edema in the lower extremities                       (1 Point)                  [ ] Pregnancy                                                     (1 Point)  [ ] Varicose veins                                               (1 Point)                  [ ] Post-partum < 6 weeks                                   (1 Point)             [x ] BMI > 25 Kg/m2                                            (1 Point)                  [ ] Hormonal therapy  or oral contraception            (1 Point)                 [ ] Sepsis (in the previous month)                        (1 Point)                  [ ] History of pregnancy complications  [x ] Pneumonia or serious lung disease                                               [ ] Unexplained or recurrent                       (1 Point)           (in the previous month)                               (1 Point)  [ ] Abnormal pulmonary function test                     (1 Point)                 SURGERY RELATED RISK FACTORS  [ ] Acute myocardial infarction                              (1 Point)                 [ ]  Section                                            (1 Point)  [ ] Congestive heart failure (in the previous month)  (1 Point)                 [ ] Minor surgery                                                 (1 Point)   [ ] Inflammatory bowel disease                             (1 Point)                 [ ] Arthroscopic surgery                                        (2 Points)  [ ] Central venous access                                    (2 Points)                [ x] General surgery lasting more than 45 minutes   (2 Points)       [ ] Stroke (in the previous month)                          (5 Points)               [ ] Elective arthroplasty                                        (5 Points)                                                                                                                                               HEMATOLOGY RELATED FACTORS                                                 TRAUMA RELATED RISK FACTORS  [ ] Prior episodes of VTE                                     (3 Points)                 [ ] Fracture of the hip, pelvis, or leg                       (5 Points)  [ ] Positive family history for VTE                         (3 Points)                 [ ] Acute spinal cord injury (in the previous month)  (5 Points)  [ ] Prothrombin 65025 A                                      (3 Points)                 [ ] Paralysis  (less than 1 month)                          (5 Points)  [ ] Factor V Leiden                                             (3 Points)                 [ ] Multiple Trauma within 1 month                         (5 Points)  [ ] Lupus anticoagulants                                     (3 Points)                                                           [ ] Anticardiolipin antibodies                                (3 Points)                                                       [ ] High homocysteine in the blood                      (3 Points)                                             [ ] Other congenital or acquired thrombophilia       (3 Points)                                                [ ] Heparin induced thrombocytopenia                  (3 Points)                                          Total Score [    6      ]

## 2018-07-19 ENCOUNTER — APPOINTMENT (OUTPATIENT)
Dept: CV DIAGNOSTICS | Facility: HOSPITAL | Age: 68
End: 2018-07-19

## 2018-07-19 PROBLEM — J39.8 OTHER SPECIFIED DISEASES OF UPPER RESPIRATORY TRACT: Chronic | Status: ACTIVE | Noted: 2017-04-12

## 2018-07-19 PROBLEM — I77.9 DISORDER OF ARTERIES AND ARTERIOLES, UNSPECIFIED: Chronic | Status: INACTIVE | Noted: 2017-09-18 | Resolved: 2018-07-18

## 2018-07-19 PROBLEM — C19 MALIGNANT NEOPLASM OF RECTOSIGMOID JUNCTION: Chronic | Status: ACTIVE | Noted: 2017-09-18

## 2018-07-20 ENCOUNTER — RX RENEWAL (OUTPATIENT)
Age: 68
End: 2018-07-20

## 2018-07-23 ENCOUNTER — TRANSCRIPTION ENCOUNTER (OUTPATIENT)
Age: 68
End: 2018-07-23

## 2018-07-24 ENCOUNTER — RESULT REVIEW (OUTPATIENT)
Age: 68
End: 2018-07-24

## 2018-07-24 ENCOUNTER — INPATIENT (INPATIENT)
Facility: HOSPITAL | Age: 68
LOS: 14 days | Discharge: HOME | DRG: 330 | End: 2018-08-08
Attending: SURGERY | Admitting: SURGERY
Payer: MEDICARE

## 2018-07-24 ENCOUNTER — APPOINTMENT (OUTPATIENT)
Dept: COLORECTAL SURGERY | Facility: HOSPITAL | Age: 68
End: 2018-07-24
Payer: MEDICARE

## 2018-07-24 VITALS
HEIGHT: 67 IN | HEART RATE: 76 BPM | DIASTOLIC BLOOD PRESSURE: 83 MMHG | WEIGHT: 154.98 LBS | OXYGEN SATURATION: 98 % | RESPIRATION RATE: 19 BRPM | TEMPERATURE: 98 F | SYSTOLIC BLOOD PRESSURE: 137 MMHG

## 2018-07-24 DIAGNOSIS — C21.1 MALIGNANT NEOPLASM OF ANAL CANAL: ICD-10-CM

## 2018-07-24 DIAGNOSIS — Z98.89 OTHER SPECIFIED POSTPROCEDURAL STATES: Chronic | ICD-10-CM

## 2018-07-24 DIAGNOSIS — H05.352 EXOSTOSIS OF LEFT ORBIT: Chronic | ICD-10-CM

## 2018-07-24 DIAGNOSIS — Z87.09 PERSONAL HISTORY OF OTHER DISEASES OF THE RESPIRATORY SYSTEM: Chronic | ICD-10-CM

## 2018-07-24 DIAGNOSIS — Z98.890 OTHER SPECIFIED POSTPROCEDURAL STATES: Chronic | ICD-10-CM

## 2018-07-24 DIAGNOSIS — K62.5 HEMORRHAGE OF ANUS AND RECTUM: Chronic | ICD-10-CM

## 2018-07-24 DIAGNOSIS — Z96.653 PRESENCE OF ARTIFICIAL KNEE JOINT, BILATERAL: Chronic | ICD-10-CM

## 2018-07-24 LAB
GLUCOSE BLDC GLUCOMTR-MCNC: 102 MG/DL — HIGH (ref 70–99)
GLUCOSE BLDC GLUCOMTR-MCNC: 154 MG/DL — HIGH (ref 70–99)
HCT VFR BLD CALC: 38.1 % — SIGNIFICANT CHANGE UP (ref 34.5–45)
HGB BLD-MCNC: 11.8 G/DL — SIGNIFICANT CHANGE UP (ref 11.5–15.5)
MCHC RBC-ENTMCNC: 23.4 PG — LOW (ref 27–34)
MCHC RBC-ENTMCNC: 30.9 GM/DL — LOW (ref 32–36)
MCV RBC AUTO: 75.7 FL — LOW (ref 80–100)
PLATELET # BLD AUTO: 212 K/UL — SIGNIFICANT CHANGE UP (ref 150–400)
RBC # BLD: 5.03 M/UL — SIGNIFICANT CHANGE UP (ref 3.8–5.2)
RBC # FLD: 14.4 % — SIGNIFICANT CHANGE UP (ref 10.3–14.5)
WBC # BLD: 14.1 K/UL — HIGH (ref 3.8–10.5)
WBC # FLD AUTO: 14.1 K/UL — HIGH (ref 3.8–10.5)

## 2018-07-24 PROCEDURE — 93010 ELECTROCARDIOGRAM REPORT: CPT

## 2018-07-24 PROCEDURE — 45110 REMOVAL OF RECTUM: CPT | Mod: 80

## 2018-07-24 PROCEDURE — 97605 NEG PRS WND THER DME<=50SQCM: CPT | Mod: 80

## 2018-07-24 PROCEDURE — 97605 NEG PRS WND THER DME<=50SQCM: CPT

## 2018-07-24 PROCEDURE — 99291 CRITICAL CARE FIRST HOUR: CPT

## 2018-07-24 PROCEDURE — 52005 CYSTO W/URTRL CATHJ: CPT

## 2018-07-24 PROCEDURE — 45110 REMOVAL OF RECTUM: CPT

## 2018-07-24 RX ORDER — GLUCAGON INJECTION, SOLUTION 0.5 MG/.1ML
1 INJECTION, SOLUTION SUBCUTANEOUS ONCE
Qty: 0 | Refills: 0 | Status: DISCONTINUED | OUTPATIENT
Start: 2018-07-24 | End: 2018-07-25

## 2018-07-24 RX ORDER — TIOTROPIUM BROMIDE 18 UG/1
1 CAPSULE ORAL; RESPIRATORY (INHALATION) DAILY
Qty: 0 | Refills: 0 | Status: DISCONTINUED | OUTPATIENT
Start: 2018-07-24 | End: 2018-08-08

## 2018-07-24 RX ORDER — SODIUM CHLORIDE 9 MG/ML
500 INJECTION, SOLUTION INTRAVENOUS ONCE
Qty: 0 | Refills: 0 | Status: COMPLETED | OUTPATIENT
Start: 2018-07-24 | End: 2018-07-24

## 2018-07-24 RX ORDER — FENTANYL CITRATE 50 UG/ML
10 INJECTION INTRAVENOUS
Qty: 0 | Refills: 0 | Status: DISCONTINUED | OUTPATIENT
Start: 2018-07-24 | End: 2018-07-24

## 2018-07-24 RX ORDER — SODIUM CHLORIDE 9 MG/ML
1000 INJECTION, SOLUTION INTRAVENOUS
Qty: 0 | Refills: 0 | Status: DISCONTINUED | OUTPATIENT
Start: 2018-07-24 | End: 2018-07-25

## 2018-07-24 RX ORDER — HEPARIN SODIUM 5000 [USP'U]/ML
5000 INJECTION INTRAVENOUS; SUBCUTANEOUS EVERY 8 HOURS
Qty: 0 | Refills: 0 | Status: DISCONTINUED | OUTPATIENT
Start: 2018-07-24 | End: 2018-07-27

## 2018-07-24 RX ORDER — DEXTROSE 50 % IN WATER 50 %
25 SYRINGE (ML) INTRAVENOUS ONCE
Qty: 0 | Refills: 0 | Status: DISCONTINUED | OUTPATIENT
Start: 2018-07-24 | End: 2018-07-25

## 2018-07-24 RX ORDER — MORPHINE SULFATE 50 MG/1
2 CAPSULE, EXTENDED RELEASE ORAL EVERY 4 HOURS
Qty: 0 | Refills: 0 | Status: DISCONTINUED | OUTPATIENT
Start: 2018-07-24 | End: 2018-07-24

## 2018-07-24 RX ORDER — DEXTROSE 50 % IN WATER 50 %
15 SYRINGE (ML) INTRAVENOUS ONCE
Qty: 0 | Refills: 0 | Status: DISCONTINUED | OUTPATIENT
Start: 2018-07-24 | End: 2018-07-25

## 2018-07-24 RX ORDER — MORPHINE SULFATE 50 MG/1
30 CAPSULE, EXTENDED RELEASE ORAL
Qty: 0 | Refills: 0 | Status: DISCONTINUED | OUTPATIENT
Start: 2018-07-24 | End: 2018-07-31

## 2018-07-24 RX ORDER — ONDANSETRON 8 MG/1
4 TABLET, FILM COATED ORAL EVERY 6 HOURS
Qty: 0 | Refills: 0 | Status: DISCONTINUED | OUTPATIENT
Start: 2018-07-24 | End: 2018-07-24

## 2018-07-24 RX ORDER — ALBUTEROL 90 UG/1
2 AEROSOL, METERED ORAL EVERY 6 HOURS
Qty: 0 | Refills: 0 | Status: DISCONTINUED | OUTPATIENT
Start: 2018-07-24 | End: 2018-08-08

## 2018-07-24 RX ORDER — MORPHINE SULFATE 50 MG/1
2 CAPSULE, EXTENDED RELEASE ORAL
Qty: 0 | Refills: 0 | Status: DISCONTINUED | OUTPATIENT
Start: 2018-07-24 | End: 2018-07-25

## 2018-07-24 RX ORDER — NALOXONE HYDROCHLORIDE 4 MG/.1ML
0.1 SPRAY NASAL
Qty: 0 | Refills: 0 | Status: DISCONTINUED | OUTPATIENT
Start: 2018-07-24 | End: 2018-07-31

## 2018-07-24 RX ORDER — SODIUM CHLORIDE 9 MG/ML
1000 INJECTION, SOLUTION INTRAVENOUS ONCE
Qty: 0 | Refills: 0 | Status: COMPLETED | OUTPATIENT
Start: 2018-07-24 | End: 2018-07-24

## 2018-07-24 RX ORDER — ACETAMINOPHEN 500 MG
1000 TABLET ORAL ONCE
Qty: 0 | Refills: 0 | Status: COMPLETED | OUTPATIENT
Start: 2018-07-25 | End: 2018-07-25

## 2018-07-24 RX ORDER — FENTANYL CITRATE 50 UG/ML
30 INJECTION INTRAVENOUS
Qty: 0 | Refills: 0 | Status: DISCONTINUED | OUTPATIENT
Start: 2018-07-24 | End: 2018-07-24

## 2018-07-24 RX ORDER — FENTANYL CITRATE 50 UG/ML
50 INJECTION INTRAVENOUS
Qty: 0 | Refills: 0 | Status: DISCONTINUED | OUTPATIENT
Start: 2018-07-24 | End: 2018-07-25

## 2018-07-24 RX ORDER — MORPHINE SULFATE 50 MG/1
2.5 CAPSULE, EXTENDED RELEASE ORAL
Qty: 0 | Refills: 0 | Status: DISCONTINUED | OUTPATIENT
Start: 2018-07-24 | End: 2018-07-31

## 2018-07-24 RX ORDER — DEXTROSE 50 % IN WATER 50 %
12.5 SYRINGE (ML) INTRAVENOUS ONCE
Qty: 0 | Refills: 0 | Status: DISCONTINUED | OUTPATIENT
Start: 2018-07-24 | End: 2018-07-25

## 2018-07-24 RX ORDER — HEPARIN SODIUM 5000 [USP'U]/ML
5000 INJECTION INTRAVENOUS; SUBCUTANEOUS ONCE
Qty: 0 | Refills: 0 | Status: COMPLETED | OUTPATIENT
Start: 2018-07-24 | End: 2018-07-24

## 2018-07-24 RX ORDER — NALOXONE HYDROCHLORIDE 4 MG/.1ML
0.1 SPRAY NASAL
Qty: 0 | Refills: 0 | Status: DISCONTINUED | OUTPATIENT
Start: 2018-07-24 | End: 2018-07-24

## 2018-07-24 RX ORDER — ONDANSETRON 8 MG/1
4 TABLET, FILM COATED ORAL ONCE
Qty: 0 | Refills: 0 | Status: DISCONTINUED | OUTPATIENT
Start: 2018-07-24 | End: 2018-07-25

## 2018-07-24 RX ORDER — PHENYLEPHRINE HYDROCHLORIDE 10 MG/ML
0.5 INJECTION INTRAVENOUS
Qty: 40 | Refills: 0 | Status: DISCONTINUED | OUTPATIENT
Start: 2018-07-24 | End: 2018-07-24

## 2018-07-24 RX ORDER — SODIUM CHLORIDE 9 MG/ML
3 INJECTION INTRAMUSCULAR; INTRAVENOUS; SUBCUTANEOUS EVERY 8 HOURS
Qty: 0 | Refills: 0 | Status: DISCONTINUED | OUTPATIENT
Start: 2018-07-24 | End: 2018-07-24

## 2018-07-24 RX ORDER — PANTOPRAZOLE SODIUM 20 MG/1
40 TABLET, DELAYED RELEASE ORAL DAILY
Qty: 0 | Refills: 0 | Status: DISCONTINUED | OUTPATIENT
Start: 2018-07-24 | End: 2018-08-08

## 2018-07-24 RX ORDER — FENTANYL CITRATE 50 UG/ML
25 INJECTION INTRAVENOUS
Qty: 0 | Refills: 0 | Status: DISCONTINUED | OUTPATIENT
Start: 2018-07-24 | End: 2018-07-25

## 2018-07-24 RX ORDER — INSULIN LISPRO 100/ML
VIAL (ML) SUBCUTANEOUS
Qty: 0 | Refills: 0 | Status: DISCONTINUED | OUTPATIENT
Start: 2018-07-24 | End: 2018-07-25

## 2018-07-24 RX ORDER — LIDOCAINE HCL 20 MG/ML
0.2 VIAL (ML) INJECTION ONCE
Qty: 0 | Refills: 0 | Status: DISCONTINUED | OUTPATIENT
Start: 2018-07-24 | End: 2018-07-24

## 2018-07-24 RX ADMIN — HEPARIN SODIUM 5000 UNIT(S): 5000 INJECTION INTRAVENOUS; SUBCUTANEOUS at 22:05

## 2018-07-24 RX ADMIN — SODIUM CHLORIDE 3 MILLILITER(S): 9 INJECTION INTRAMUSCULAR; INTRAVENOUS; SUBCUTANEOUS at 12:05

## 2018-07-24 RX ADMIN — MORPHINE SULFATE 2 MILLIGRAM(S): 50 CAPSULE, EXTENDED RELEASE ORAL at 19:51

## 2018-07-24 RX ADMIN — PANTOPRAZOLE SODIUM 40 MILLIGRAM(S): 20 TABLET, DELAYED RELEASE ORAL at 22:05

## 2018-07-24 RX ADMIN — SODIUM CHLORIDE 1000 MILLILITER(S): 9 INJECTION, SOLUTION INTRAVENOUS at 19:19

## 2018-07-24 RX ADMIN — HEPARIN SODIUM 5000 UNIT(S): 5000 INJECTION INTRAVENOUS; SUBCUTANEOUS at 12:03

## 2018-07-24 RX ADMIN — MORPHINE SULFATE 2 MILLIGRAM(S): 50 CAPSULE, EXTENDED RELEASE ORAL at 19:05

## 2018-07-24 RX ADMIN — MORPHINE SULFATE 30 MILLILITER(S): 50 CAPSULE, EXTENDED RELEASE ORAL at 20:43

## 2018-07-24 RX ADMIN — SODIUM CHLORIDE 1000 MILLILITER(S): 9 INJECTION, SOLUTION INTRAVENOUS at 23:20

## 2018-07-24 RX ADMIN — MORPHINE SULFATE 2 MILLIGRAM(S): 50 CAPSULE, EXTENDED RELEASE ORAL at 19:37

## 2018-07-24 RX ADMIN — MORPHINE SULFATE 2 MILLIGRAM(S): 50 CAPSULE, EXTENDED RELEASE ORAL at 19:43

## 2018-07-24 RX ADMIN — SODIUM CHLORIDE 100 MILLILITER(S): 9 INJECTION, SOLUTION INTRAVENOUS at 19:57

## 2018-07-24 NOTE — BRIEF OPERATIVE NOTE - PROCEDURE
<<-----Click on this checkbox to enter Procedure Abdominoperineal proctectomy  07/24/2018    Active  [id number]

## 2018-07-24 NOTE — CONSULT NOTE ADULT - SUBJECTIVE AND OBJECTIVE BOX
HISTORY OF PRESENT ILLNESS:  RAYRAY RODRIGUEZ is a 67y Female PMH TIA, A-fib, HTN, COPD, DM, POD 0 s/p APR for rectal cancer. Operation was uncomplicated - Pt received 2L crystalloid intra-operatively, UOP 800cc. EBL 75cc. Patient became hypotensive to SBP 90s in the PACU, recieved 500cc bolus of LR and responded to SBO 110s. SICU consulted for hemodynamic monitoring.     PAST MEDICAL HISTORY: TIA (transient ischemic attack)  DVT (deep venous thrombosis)  Seizure  Rectal bleeding  Aortic disease  Colorectal cancer  Tracheobronchomalacia  Hypertension  Asthma  Pelvic fracture  Aortic insufficiency  Adrenal insufficiency  COPD (chronic obstructive pulmonary disease)  Hypertension  Diabetes  Atrial fibrillation      PAST SURGICAL HISTORY: Rectal bleeding  S/P bronchoscopy  History of tracheomalacia  History of surgery  H/O pelvic surgery  Exostosis of orbit, left  History of sinus surgery  H/O total knee replacement, bilateral  History of partial hysterectomy      FAMILY HISTORY: Family history of diabetes mellitus type II  Family history of breast cancer (Sibling)  Family history of asthma    CODE STATUS: FULL    HOME MEDICATIONS:    ALLERGIES: ampicillin (Short breath)  aspirin (Short breath)  Avelox (Short breath; Pruritus)  codeine (Short breath)  Dilaudid (Short breath)  iodine (Short breath; Swelling)  penicillin (Short breath)  shellfish (Anaphylaxis)  tetanus toxoid (Short breath)  Valium (Short breath)      VITAL SIGNS:  ICU Vital Signs Last 24 Hrs  T(C): 36.8 (24 Jul 2018 21:00), Max: 36.8 (24 Jul 2018 21:00)  T(F): 98.2 (24 Jul 2018 21:00), Max: 98.2 (24 Jul 2018 21:00)  HR: 82 (24 Jul 2018 23:00) (61 - 82)  BP: 109/52 (24 Jul 2018 23:00) (91/54 - 137/83)  BP(mean): 74 (24 Jul 2018 23:00) (67 - 85)  ABP: 101/48 (24 Jul 2018 23:00) (76/50 - 125/52)  ABP(mean): 64 (24 Jul 2018 23:00) (59 - 79)  RR: 16 (24 Jul 2018 23:00) (16 - 19)  SpO2: 100% (24 Jul 2018 23:00) (95% - 100%)      NEURO  Exam: AAOx4  Meds:fentaNYL    Injectable 25 MICROGram(s) IV Push every 5 minutes PRN Moderate Pain  fentaNYL    Injectable 50 MICROGram(s) IV Push every 5 minutes PRN Severe Pain  morphine  - Injectable 2 milliGRAM(s) IV Push every 10 minutes PRN Moderate Pain (4 - 6)  morphine PCA (5 mG/mL) 30 milliLiter(s) PCA Continuous PCA Continuous  morphine PCA (5 mG/mL) Rescue Clinician Bolus 2.5 milliGRAM(s) IV Push every 15 minutes PRN for Pain Scale GREATER THAN 6  ondansetron Injectable 4 milliGRAM(s) IV Push once PRN Nausea and/or Vomiting      RESPIRATORY  Mechanical Ventilation:     Exam: Sat 100% on RA  Meds:ALBUTerol    90 MICROgram(s) HFA Inhaler 2 Puff(s) Inhalation every 6 hours PRN Shortness of Breath  tiotropium 18 MICROgram(s) Capsule 1 Capsule(s) Inhalation daily      CARDIOVASCULAR    Exam:  Cardiac Rhythm: NSR  Meds:    GI/NUTRITION  Exam: Soft, mildly distended. Incisions c/d/i. Ostomy in place, minimal output in bag. Rectal sump in place.  Diet: NPO  Meds:pantoprazole  Injectable 40 milliGRAM(s) IV Push daily      GENITOURINARY/RENAL  Meds:dextrose 5%. 1000 milliLiter(s) IV Continuous <Continuous>  lactated ringers. 1000 milliLiter(s) IV Continuous <Continuous>      07-24 @ 07:01  -  07-24 @ 23:41  --------------------------------------------------------  IN:    lactated ringers.: 500 mL  Total IN: 500 mL    OUT:    Evacuated Tube System: 70 mL    Indwelling Catheter - Urethral: 390 mL    VAC (Vacuum Assisted Closure) System: 5 mL  Total OUT: 465 mL    Total NET: 35 mL        Weight (kg): 70.3 (07-24 @ 11:53)        [X] Amezcua catheter, indication: urine output monitoring in critically ill patient    HEMATOLOGIC  [ ] VTE Prophylaxis:  heparin  Injectable 5000 Unit(s) SubCutaneous every 8 hours                          11.8   14.1  )-----------( 212      ( 24 Jul 2018 20:10 )             38.1       Transfusion: [ ] PRBC	[ ] Platelets	[ ] FFP	[ ] Cryoprecipitate      INFECTIOUS DISEASES  Meds:aztreonam  IVPB 2000 milliGRAM(s) IV Intermittent once  clindamycin IVPB 900 milliGRAM(s) IV Intermittent once    RECENT CULTURES:      ENDOCRINE  Meds:dextrose 40% Gel 15 Gram(s) Oral once PRN  dextrose 50% Injectable 12.5 Gram(s) IV Push once  dextrose 50% Injectable 25 Gram(s) IV Push once  dextrose 50% Injectable 25 Gram(s) IV Push once  glucagon  Injectable 1 milliGRAM(s) IntraMuscular once PRN  insulin lispro (HumaLOG) corrective regimen sliding scale   SubCutaneous three times a day before meals    CAPILLARY BLOOD GLUCOSE      POCT Blood Glucose.: 154 mg/dL (24 Jul 2018 20:15)  POCT Blood Glucose.: 102 mg/dL (24 Jul 2018 09:53)      PATIENT CARE ACCESS DEVICES:  [X] Peripheral IV  [ ] Central Venous Line	[ ] R	[ ] L	[ ] IJ	[ ] Fem	[ ] SC	Placed:   [X] Arterial Line		[ ] R	[ ] L	[ ] Fem	[ ] Rad	[ ] Ax	Placed:   [ ] PICC:					[ ] Mediport  [ ] Urinary Catheter, Date Placed:   [x] Necessity of urinary, arterial, and venous catheters discussed    OTHER MEDICATIONS: naloxone Injectable 0.1 milliGRAM(s) IV Push every 3 minutes PRN      IMAGING STUDIES:

## 2018-07-24 NOTE — CHART NOTE - NSCHARTNOTEFT_GEN_A_CORE
POST-OPERATIVE NOTE    Subjective:  Patient is s/p APR for anal carcinoma. Denies any nausea or vomiting. Pain well controlled on PCA. Denies chest pain, SOB, or dizziness. Amezcua in place.     Vital Signs Last 24 Hrs  T(C): 36.8 (24 Jul 2018 21:00), Max: 36.8 (24 Jul 2018 21:00)  T(F): 98.2 (24 Jul 2018 21:00), Max: 98.2 (24 Jul 2018 21:00)  HR: 73 (24 Jul 2018 21:30) (61 - 76)  BP: 132/59 (24 Jul 2018 21:30) (91/54 - 137/83)  BP(mean): 85 (24 Jul 2018 21:30) (67 - 85)  RR: 16 (24 Jul 2018 21:30) (16 - 19)  SpO2: 97% (24 Jul 2018 21:30) (97% - 100%)  I&O's Detail    24 Jul 2018 07:01  -  24 Jul 2018 21:58  --------------------------------------------------------  IN:    lactated ringers.: 300 mL  Total IN: 300 mL    OUT:    Evacuated Tube System: 70 mL    Indwelling Catheter - Urethral: 280 mL    VAC (Vacuum Assisted Closure) System: 5 mL  Total OUT: 355 mL    Total NET: -55 mL        aztreonam  IVPB 2000  clindamycin IVPB 900  aztreonam  IVPB 2000  clindamycin IVPB 900  heparin  Injectable 5000    PAST MEDICAL & SURGICAL HISTORY:  TIA (transient ischemic attack): multiple, last 5 years ago - presents as right-sided weakness  DVT (deep venous thrombosis): 15-20 years ago, took coumadin  Seizure: x 1 1/7/18  Rectal bleeding  Colorectal cancer: 4/2018- last treatment , chemo and radiation  Tracheobronchomalacia: diagnosed 2015, s/p bronchial thermoplasty 2016 (Dr Zapien); recent bronchoscopy 6/5/2018 revealed no evidence of tracheobronchomalacia in trachea or bronchial tubes  Asthma  Pelvic fracture  Aortic insufficiency: moderate AR on echo 5/3/2018  Adrenal insufficiency: Medrol daily for over 50 years  COPD (chronic obstructive pulmonary disease)  Diabetes: Type 2  Atrial fibrillation: paroxysmal, on eliquis  Rectal bleeding: exam under anesthesia ([facility]) 2/2018  S/P bronchoscopy: 6/5/2018 - [facility] (Dr Zapien) no evidence of tracheobronchomalacia in trachea or bronchial tubes  History of tracheomalacia: 2015 - attempted tracheal stenting ([facility])- course complicated by obstruction, respiratory failure, multiple CPR attempts -  stent discontinued; 10/20/2016 Tracheobronchoplasty (Prolene Mesh) performed at [facility] by Dr Zapien  H/O pelvic surgery: 5 years ago - s/p fracture  Exostosis of orbit, 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 sump drain 30cc of sanguinous output, midline incision dressing serosanguinous drainage, anal vac in place output minimal   Extremities: WWP      LABS:                        11.8   14.1  )-----------( 212      ( 24 Jul 2018 20:10 )             38.1             CAPILLARY BLOOD GLUCOSE      POCT Blood Glucose.: 154 mg/dL (24 Jul 2018 20:15)  POCT Blood Glucose.: 102 mg/dL (24 Jul 2018 09:53)      Radiology and Additional Studies:    Assessment:  The patient is a 67y Female who is now several hours post-op from a     Plan:  - Pain control as needed  - DVT ppx  - OOB and ambulating as tolerated  - F/u AM labs  - monitor UOP   - keep in PACU overnight     Zuleima Appiah, PGY-1  Red Team Surgery [phone]

## 2018-07-25 ENCOUNTER — TRANSCRIPTION ENCOUNTER (OUTPATIENT)
Age: 68
End: 2018-07-25

## 2018-07-25 LAB
ANION GAP SERPL CALC-SCNC: 10 MMOL/L — SIGNIFICANT CHANGE UP (ref 5–17)
ANION GAP SERPL CALC-SCNC: 11 MMOL/L — SIGNIFICANT CHANGE UP (ref 5–17)
BUN SERPL-MCNC: 10 MG/DL — SIGNIFICANT CHANGE UP (ref 7–23)
BUN SERPL-MCNC: 9 MG/DL — SIGNIFICANT CHANGE UP (ref 7–23)
CALCIUM SERPL-MCNC: 8.2 MG/DL — LOW (ref 8.4–10.5)
CALCIUM SERPL-MCNC: 8.2 MG/DL — LOW (ref 8.4–10.5)
CHLORIDE SERPL-SCNC: 104 MMOL/L — SIGNIFICANT CHANGE UP (ref 96–108)
CHLORIDE SERPL-SCNC: 106 MMOL/L — SIGNIFICANT CHANGE UP (ref 96–108)
CO2 SERPL-SCNC: 25 MMOL/L — SIGNIFICANT CHANGE UP (ref 22–31)
CO2 SERPL-SCNC: 26 MMOL/L — SIGNIFICANT CHANGE UP (ref 22–31)
CREAT SERPL-MCNC: 0.73 MG/DL — SIGNIFICANT CHANGE UP (ref 0.5–1.3)
CREAT SERPL-MCNC: 0.77 MG/DL — SIGNIFICANT CHANGE UP (ref 0.5–1.3)
GLUCOSE BLDC GLUCOMTR-MCNC: 124 MG/DL — HIGH (ref 70–99)
GLUCOSE BLDC GLUCOMTR-MCNC: 142 MG/DL — HIGH (ref 70–99)
GLUCOSE SERPL-MCNC: 191 MG/DL — HIGH (ref 70–99)
GLUCOSE SERPL-MCNC: 201 MG/DL — HIGH (ref 70–99)
HCT VFR BLD CALC: 37.5 % — SIGNIFICANT CHANGE UP (ref 34.5–45)
HCT VFR BLD CALC: 37.9 % — SIGNIFICANT CHANGE UP (ref 34.5–45)
HGB BLD-MCNC: 11.4 G/DL — LOW (ref 11.5–15.5)
HGB BLD-MCNC: 11.4 G/DL — LOW (ref 11.5–15.5)
LACTATE SERPL-SCNC: 1.8 MMOL/L — SIGNIFICANT CHANGE UP (ref 0.7–2)
MAGNESIUM SERPL-MCNC: 2 MG/DL — SIGNIFICANT CHANGE UP (ref 1.6–2.6)
MAGNESIUM SERPL-MCNC: 2.1 MG/DL — SIGNIFICANT CHANGE UP (ref 1.6–2.6)
MCHC RBC-ENTMCNC: 22.8 PG — LOW (ref 27–34)
MCHC RBC-ENTMCNC: 23.1 PG — LOW (ref 27–34)
MCHC RBC-ENTMCNC: 30 GM/DL — LOW (ref 32–36)
MCHC RBC-ENTMCNC: 30.3 GM/DL — LOW (ref 32–36)
MCV RBC AUTO: 76 FL — LOW (ref 80–100)
MCV RBC AUTO: 76.1 FL — LOW (ref 80–100)
PHOSPHATE SERPL-MCNC: 4.1 MG/DL — SIGNIFICANT CHANGE UP (ref 2.5–4.5)
PHOSPHATE SERPL-MCNC: 4.2 MG/DL — SIGNIFICANT CHANGE UP (ref 2.5–4.5)
PLATELET # BLD AUTO: 201 K/UL — SIGNIFICANT CHANGE UP (ref 150–400)
PLATELET # BLD AUTO: 215 K/UL — SIGNIFICANT CHANGE UP (ref 150–400)
POTASSIUM SERPL-MCNC: 4.4 MMOL/L — SIGNIFICANT CHANGE UP (ref 3.5–5.3)
POTASSIUM SERPL-MCNC: 4.6 MMOL/L — SIGNIFICANT CHANGE UP (ref 3.5–5.3)
POTASSIUM SERPL-SCNC: 4.4 MMOL/L — SIGNIFICANT CHANGE UP (ref 3.5–5.3)
POTASSIUM SERPL-SCNC: 4.6 MMOL/L — SIGNIFICANT CHANGE UP (ref 3.5–5.3)
RBC # BLD: 4.93 M/UL — SIGNIFICANT CHANGE UP (ref 3.8–5.2)
RBC # BLD: 4.98 M/UL — SIGNIFICANT CHANGE UP (ref 3.8–5.2)
RBC # FLD: 14.5 % — SIGNIFICANT CHANGE UP (ref 10.3–14.5)
RBC # FLD: 14.6 % — HIGH (ref 10.3–14.5)
SODIUM SERPL-SCNC: 140 MMOL/L — SIGNIFICANT CHANGE UP (ref 135–145)
SODIUM SERPL-SCNC: 142 MMOL/L — SIGNIFICANT CHANGE UP (ref 135–145)
TROPONIN T, HIGH SENSITIVITY RESULT: 8 NG/L — SIGNIFICANT CHANGE UP (ref 0–51)
WBC # BLD: 14.8 K/UL — HIGH (ref 3.8–10.5)
WBC # BLD: 15.9 K/UL — HIGH (ref 3.8–10.5)
WBC # FLD AUTO: 14.8 K/UL — HIGH (ref 3.8–10.5)
WBC # FLD AUTO: 15.9 K/UL — HIGH (ref 3.8–10.5)

## 2018-07-25 PROCEDURE — 99223 1ST HOSP IP/OBS HIGH 75: CPT

## 2018-07-25 RX ORDER — DEXTROSE 50 % IN WATER 50 %
25 SYRINGE (ML) INTRAVENOUS ONCE
Qty: 0 | Refills: 0 | Status: DISCONTINUED | OUTPATIENT
Start: 2018-07-25 | End: 2018-08-08

## 2018-07-25 RX ORDER — INSULIN LISPRO 100/ML
VIAL (ML) SUBCUTANEOUS EVERY 6 HOURS
Qty: 0 | Refills: 0 | Status: DISCONTINUED | OUTPATIENT
Start: 2018-07-25 | End: 2018-07-27

## 2018-07-25 RX ORDER — DIGOXIN 250 MCG
0.25 TABLET ORAL DAILY
Qty: 0 | Refills: 0 | Status: DISCONTINUED | OUTPATIENT
Start: 2018-07-25 | End: 2018-07-26

## 2018-07-25 RX ORDER — DEXTROSE 50 % IN WATER 50 %
15 SYRINGE (ML) INTRAVENOUS ONCE
Qty: 0 | Refills: 0 | Status: DISCONTINUED | OUTPATIENT
Start: 2018-07-25 | End: 2018-08-08

## 2018-07-25 RX ORDER — SODIUM CHLORIDE 9 MG/ML
1000 INJECTION, SOLUTION INTRAVENOUS
Qty: 0 | Refills: 0 | Status: DISCONTINUED | OUTPATIENT
Start: 2018-07-25 | End: 2018-08-08

## 2018-07-25 RX ORDER — IPRATROPIUM/ALBUTEROL SULFATE 18-103MCG
3 AEROSOL WITH ADAPTER (GRAM) INHALATION EVERY 6 HOURS
Qty: 0 | Refills: 0 | Status: DISCONTINUED | OUTPATIENT
Start: 2018-07-25 | End: 2018-08-08

## 2018-07-25 RX ORDER — DIPHENHYDRAMINE HCL 50 MG
12.5 CAPSULE ORAL EVERY 4 HOURS
Qty: 0 | Refills: 0 | Status: DISCONTINUED | OUTPATIENT
Start: 2018-07-25 | End: 2018-08-08

## 2018-07-25 RX ORDER — GLUCAGON INJECTION, SOLUTION 0.5 MG/.1ML
1 INJECTION, SOLUTION SUBCUTANEOUS ONCE
Qty: 0 | Refills: 0 | Status: DISCONTINUED | OUTPATIENT
Start: 2018-07-25 | End: 2018-08-08

## 2018-07-25 RX ORDER — SODIUM CHLORIDE 9 MG/ML
1000 INJECTION, SOLUTION INTRAVENOUS
Qty: 0 | Refills: 0 | Status: DISCONTINUED | OUTPATIENT
Start: 2018-07-25 | End: 2018-07-26

## 2018-07-25 RX ORDER — DEXTROSE 50 % IN WATER 50 %
12.5 SYRINGE (ML) INTRAVENOUS ONCE
Qty: 0 | Refills: 0 | Status: DISCONTINUED | OUTPATIENT
Start: 2018-07-25 | End: 2018-08-08

## 2018-07-25 RX ADMIN — Medication 1000 MILLIGRAM(S): at 00:15

## 2018-07-25 RX ADMIN — HEPARIN SODIUM 5000 UNIT(S): 5000 INJECTION INTRAVENOUS; SUBCUTANEOUS at 05:38

## 2018-07-25 RX ADMIN — Medication 12.5 MILLIGRAM(S): at 20:48

## 2018-07-25 RX ADMIN — MORPHINE SULFATE 30 MILLILITER(S): 50 CAPSULE, EXTENDED RELEASE ORAL at 20:03

## 2018-07-25 RX ADMIN — Medication 400 MILLIGRAM(S): at 00:00

## 2018-07-25 RX ADMIN — Medication 4 MILLIGRAM(S): at 16:24

## 2018-07-25 RX ADMIN — PANTOPRAZOLE SODIUM 40 MILLIGRAM(S): 20 TABLET, DELAYED RELEASE ORAL at 13:37

## 2018-07-25 RX ADMIN — HEPARIN SODIUM 5000 UNIT(S): 5000 INJECTION INTRAVENOUS; SUBCUTANEOUS at 13:54

## 2018-07-25 RX ADMIN — Medication 3 MILLILITER(S): at 18:45

## 2018-07-25 RX ADMIN — Medication 400 MILLIGRAM(S): at 05:38

## 2018-07-25 RX ADMIN — Medication 1000 MILLIGRAM(S): at 06:00

## 2018-07-25 RX ADMIN — Medication 12.5 MILLIGRAM(S): at 09:58

## 2018-07-25 RX ADMIN — MORPHINE SULFATE 30 MILLILITER(S): 50 CAPSULE, EXTENDED RELEASE ORAL at 07:01

## 2018-07-25 RX ADMIN — TIOTROPIUM BROMIDE 1 CAPSULE(S): 18 CAPSULE ORAL; RESPIRATORY (INHALATION) at 20:50

## 2018-07-25 RX ADMIN — Medication 12.5 MILLIGRAM(S): at 13:51

## 2018-07-25 RX ADMIN — HEPARIN SODIUM 5000 UNIT(S): 5000 INJECTION INTRAVENOUS; SUBCUTANEOUS at 20:54

## 2018-07-25 NOTE — CONSULT NOTE ADULT - SUBJECTIVE AND OBJECTIVE BOX
**********              CARDIOLOGY CONSULT NOTE              ************  ============================================================  CHIEF COMPLAINT/REASON FOR CONSULT:  Patient is a 67y old  Female who presents with a chief complaint of rectal bleeding (2018 10:00)      HISTORY OF PRESENT ILLNESS:  67yFemale with a history of Remote Asthma, DVT, TIA, COPD, Moderate AI, Normal LVEF, pAF on Digoxin/Eliquis prior to admission, Colorectal CA p/w bleeding s/p SCC resection POD#1  - presenting for SCC resection.  No CP/SOB/Palps.         ============================================================    ============================================================      Allergies    ampicillin (Short breath)  aspirin (Short breath)  Avelox (Short breath; Pruritus)  codeine (Short breath)  Dilaudid (Short breath)  iodine (Short breath; Swelling)  penicillin (Short breath)  shellfish (Anaphylaxis)  tetanus toxoid (Short breath)  Valium (Short breath)    Intolerances    	    MEDICATIONS:  heparin  Injectable 5000 Unit(s) SubCutaneous every 8 hours    aztreonam  IVPB 2000 milliGRAM(s) IV Intermittent once  clindamycin IVPB 900 milliGRAM(s) IV Intermittent once    ALBUTerol    90 MICROgram(s) HFA Inhaler 2 Puff(s) Inhalation every 6 hours PRN  ALBUTerol/ipratropium for Nebulization 3 milliLiter(s) Nebulizer every 6 hours  diphenhydrAMINE   Injectable 12.5 milliGRAM(s) IV Push every 4 hours PRN  tiotropium 18 MICROgram(s) Capsule 1 Capsule(s) Inhalation daily    morphine PCA (5 mG/mL) 30 milliLiter(s) PCA Continuous PCA Continuous  morphine PCA (5 mG/mL) Rescue Clinician Bolus 2.5 milliGRAM(s) IV Push every 15 minutes PRN    pantoprazole  Injectable 40 milliGRAM(s) IV Push daily    dextrose 40% Gel 15 Gram(s) Oral once PRN  dextrose 50% Injectable 12.5 Gram(s) IV Push once  dextrose 50% Injectable 25 Gram(s) IV Push once  dextrose 50% Injectable 25 Gram(s) IV Push once  glucagon  Injectable 1 milliGRAM(s) IntraMuscular once PRN  insulin lispro (HumaLOG) corrective regimen sliding scale   SubCutaneous every 6 hours    dextrose 5% + sodium chloride 0.45%. 1000 milliLiter(s) IV Continuous <Continuous>  dextrose 5%. 1000 milliLiter(s) IV Continuous <Continuous>      PAST MEDICAL & SURGICAL HISTORY:  TIA (transient ischemic attack): multiple, last 5 years ago - presents as right-sided weakness  DVT (deep venous thrombosis): 15-20 years ago, took coumadin  Seizure: x 1 18  Rectal bleeding  Colorectal cancer: 2018- last treatment , chemo and radiation  Tracheobronchomalacia: diagnosed , s/p bronchial thermoplasty  (Dr Zapien); recent bronchoscopy 2018 revealed no evidence of tracheobronchomalacia in trachea or bronchial tubes  Asthma  Pelvic fracture  Aortic insufficiency: moderate AR on echo 5/3/2018  Adrenal insufficiency: Medrol daily for over 50 years  COPD (chronic obstructive pulmonary disease)  Diabetes: Type 2  Atrial fibrillation: paroxysmal, on eliquis  Rectal bleeding: exam under anesthesia ([facility]) 2018  S/P bronchoscopy: 2018 - [facility] (Dr Zapien) no evidence of tracheobronchomalacia in trachea or bronchial tubes  History of tracheomalacia:  - attempted tracheal stenting (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  History of sinus surgery: multiple sinus surgeries  H/O total knee replacement, bilateral: 5 years ago  History of partial hysterectomy: 30 years ago - fibroids      FAMILY HISTORY:  Family history of diabetes mellitus type II  Family history of breast cancer (Sibling)  Family history of asthma    NC -   Mother - HTN  Father - DM    SOCIAL HISTORY:    [ x] Non-smoker  [x] No Illicit Drug Use  [ x] No Excess EtOH Use      REVIEW OF SYSTEMS: (Unless + Before Symptom, it is negative)  Constitutional: No Fever, Fatigue, Weight Changes  Eyes: No Recent Vision Changes, Eye Pain  ENT: No Congestion, Sore Throat  Endocrine: No Excess Sweating, Temperature Intolerance  Cardiovascular: No Chest Pain, Palpitations, Shortness of Breath, Pre-syncope, Syncope, LE Edema  Respiratory: No Cough, Congestion, Wheezing  Gastrointestinal: +Abdominal Pain, Nausea, Vomiting  Genitourinary: No dysuria, hematuria  Musculoskeletal: No Joint Pain, Swelling  Neurologic: No headaches, Imbalance, Weakness  Skin: No rashes, hematoma, purprura    ================================    PHYSICAL EXAM:  T(C): 36.7 (18 @ 14:29), Max: 37.1 (18 @ 07:35)  HR: 77 (18 @ 14:29) (61 - 86)  BP: 120/65 (18 @ 14:29) (91/54 - 132/59)  RR: 18 (18 @ 14:29) (15 - 18)  SpO2: 99% (18 @ 14:29) (95% - 100%)  Wt(kg): --  I&O's Summary      -  2018 07:00  --------------------------------------------------------  IN: 2200 mL / OUT: 1200 mL / NET: 1000 mL      -  2018 15:18  --------------------------------------------------------  IN: 0 mL / OUT: 1050 mL / NET: -1050 mL      Appearance: Normal; NAD	  HEENT:   Normal oral mucosa, EOMI	  Lymphatic: No lymphadenopathy  Cardiovascular: Normal S1 S2, No JVD, No murmurs, No edema  Respiratory: Lungs clear to auscultation, no use of accessory muscles	  Psychiatry: A & O x 3, Mood & affect appropriate  Gastrointestinal:  Soft, +Distended +Tender +Ostomy  Skin: No rashes, No ecchymoses, No cyanosis	  Neurologic: Non-focal, No Focal Deficits  Extremities: Normal range of motion, No clubbing, cyanosis or edema  Vascular: Peripheral pulses palpable 2+ bilaterally, no prominent varicosities    ============================    LABS:	   Labs  @ 15:18	    CBC Full  -  ( 2018 04:32 )  WBC Count : 14.8 K/uL  Hemoglobin : 11.4 g/dL  Hematocrit : 37.5 %  Platelet Count - Automated : 201 K/uL  Mean Cell Volume : 76.1 fl  Mean Cell Hemoglobin : 23.1 pg  Mean Cell Hemoglobin Concentration : 30.3 gm/dL  Auto Neutrophil # : x  Auto Lymphocyte # : x  Auto Monocyte # : x  Auto Eosinophil # : x  Auto Basophil # : x  Auto Neutrophil % : x  Auto Lymphocyte % : x  Auto Monocyte % : x  Auto Eosinophil % : x  Auto Basophil % : x        142  |  106  |  10  ----------------------------<  191<H>  4.6   |  26  |  0.77      140  |  104  |  9   ----------------------------<  201<H>  4.4   |  25  |  0.73    Ca    8.2<L>      2018 04:32  Ca    8.2<L>      2018 00:16  Phos  4.2       Phos  4.1       Mg     2.1       Mg     2.0               =========================================================================  CXR:  < from: Xray Chest 1 View- PORTABLE-Urgent (18 @ 16:02) >    EXAM:  XR CHEST PORTABLE URGENT 1V                          PROCEDURE DATE:  2018          INTERPRETATION:  Portable Chest X-Ray dated 2018 4:02 PM    Indication: s/p bronch    Prior studies: 2018    An AP portable view of the chest revealed cardiomegaly. Trachea midline.   No pneumothorax seen. Trace right pleural effusion. Increased markings in   the left lower lobe. Right Port-A-Cath with the tip in the right atrium.    IMPRESSION:  No pneumothorax seen. Trace right pleural effusion.             "Thank you for the opportunity to participate in the care of this   patient."    < end of copied text >      ECG:  	    PREVIOUS DIAGNOSTIC TESTING:    [X] Echocardiogram:  < from: Echocardiogram (18 @ 10:55) >      INTERPRETATION:  Patient Height: 170.2 cm  Patient Weight: 73.0 kg  Heart Rate: 70 bpm  Systolic Pressure: 132 mmHg  Diastolic Pressure: 70 mmHg  BSA: 1.8 m^2  Interpretation Summary  The left atrial size is normal. Right atrial size is normal.There is mild   aortic valve thickening. The aortic valve is trileaflet. There is   moderate   aortic regurgitation. No hemodynamically significantvalvular aortic   stenosis.    There is mild mitral valve thickening. There is mild mitral annular   calcification. There is trace mitral regurgitation.  Structurally normal   tricuspid valve. There is trace tricuspid regurgitation.There was   insufficient   TR detected from which to calculate pulmonary artery systolic pressure.    The   pulmonic valve is not well visualized. No pulmonic regurgitation   noted.The   right ventricle is normal in size and function.There is moderate   concentric   left ventricular hypertrophy. The left ventricular wall motion is   normal. The   left ventricular ejection fraction is 63%.  No aortic root   dilatation.There is   no pericardial effusion.When compared to prior study performed on   10/21/16,   there isno significant change.  Procedure Details  A complete two-dimensional transthoracic echocardiogram was performed (2D,  M-mode, spectral and color flow doppler).  Study Quality: Fair.  Left Ventricle  There is moderate concentric left ventricular hypertrophy.  The left ventricular wall motion is normal.  The left ventricular ejection fraction is 63%.  Left Atrium  The left atrial size is normal.  Right Atrium  Right atrial size is normal.  Right Ventricle  The right ventricle is normal in size andfunction.  Aortic Valve  There is mild aortic valve thickening.  The aortic valve is trileaflet.  There is moderate aortic regurgitation.  No hemodynamically significant valvular aortic stenosis.  Mitral Valve  There is mild mitral valve thickening.  There is mild mitral annular calcification.  There is trace mitral regurgitation.  Tricuspid Valve  Structurally normal tricuspid valve.  There was insufficient TR detected from which to calculate pulmonary   artery  systolic pressure.  There is trace tricuspid regurgitation.  Pulmonic Valve  The pulmonic valve is not well visualized.  No pulmonic regurgitation noted.  Arteries and Venous System  No aortic root dilatation.  The inferior vena cava is normal in size (<2.1 cm) with normal inspiratory  collapse (>50%) consistent with normal right atrial pressure.  Pericardium / Pleura  There is no pericardial effusion.  Doppler Measurements & Calculations  MV E point: 67.9 cm/sec  MV A point: 91.3 cm/sec  MV E/A: 0.7  MV dec time: 0.2 sec  Ao V2 max: 172.4 cm/sec  Ao max P.9 mmHg  Ao max PG (full): 5.5 mmHg  SUSANA(V,A): 2.4 cm^2  SUSANA(V,D): 2.4 cm^2  AI max lynette: 399.1 cm/sec  AI max P.7 mmHg  AI dec slope: 275.9 cm/sec^2  AI P1/2t: 423.7 msec  LV max P.4 mmHg  LV V1 max: 126.5 cm/sec  MMode 2D Measurements & Calculations  IVSd: 1.3 cm  LVIDd: 4.2 cm  LVIDs: 2.9 cm  LVPWd: 1.1 cm  IVS/LVPW: 1.2  FS: 31.3 %  EDV(Teich): 80.1 ml  ESV(Teich): 32.5 ml  LV mass(C)d: 180.5 grams  LV mass(C)dI: 97.9 grams/m^2  SI(cubed): 27.8 ml/m^2  Ao root diam: 3.3 cm  Ao root area: 8.8 cm^2  LA dimension: 4.6 cm  LA/Ao: 1.4  LVOT diam: 2.0 cm  LVOT area: 3.2 cm^2  LVOT area (M): 3.8 cm^2  LVLd ap4: 7.6 cm  EDV(MOD-sp4): 70 ml  LVLs ap4: 6.6 cm  ESV(MOD-sp4): 22 ml  EF(MOD-sp4): 68.6 %  LVLd ap2: 7.4 cm  EDV(MOD-sp2): 43 ml  LVLs ap2: 5.9 cm  ESV(MOD-sp2): 16 ml  EF(MOD-sp2): 62.8 %  SV(MOD-sp4): 48 ml  SI(MOD-sp4): 26.0 ml/m^2  SV(MOD-sp2): 27 ml  SI(MOD-sp2): 14.6 ml/m^2  Interpreting Physician:OMAR Dias electronically signed on   2018 12:45:03        =========================================================================  ASSESSMENT:    67yFemale with a history of Remote Asthma, DVT, TIA, COPD, Moderate AI, Normal LVEF, pAF on Digoxin/Eliquis prior to admission, Colorectal CA p/w bleeding s/p SCC resection POD#1  - presenting for SCC resection.    Recs  - Resume Eliquis when able  - Resume Digoxin  - Hold Diovan  - Careful with fluid load given AI  - Thank you for this consult.  - Will Follow      Please call with questions.     Baron Tristan MD, [facility]  [phone]                                  Total time spent in face-to-face encounter was 80 minutes. > 50 minutes spent in counseling and coordination of care addressing all medical issues listed above. All labs, imaging, consultant reports, and any relevant outside medical records personally reviewed in order to evaluate and manage the patient medically as well as provide coordination of care amongst providers.

## 2018-07-25 NOTE — CONSULT NOTE ADULT - ASSESSMENT
67y Female PMH TIA, A-fib, HTN, COPD, DM, POD 0 s/p APR for rectal cancer, hypotension to SBP 90s in PACU, s/p 500cc bolus LR. Currently hemodynamically stable. SICU consulted for hemodynamic monitoring.     - 1L LR bolus - monitor UOP  - Will check Midnight labs - CBC, BMP, Mg, Phos, Lactate, Trop  - Pain control with PCA  - Monitor VS for any developing hypotension  - Dispo per primary team  - Discussed with attending Dr. Guevara  - Please call SICU with any questions/acute changes in clinical status    CARMELO Gabriel PGY2  SICU  [id number]
67 yr F with PMH of COPD/asthma, tracheobronchomalacia s/p tracheo-bronchoplasty in 10/16, Afib on Eliquis , Adrenal Insufficieny on steroids,  DM2, HTN, Squamous cell CA of the anus on chemo/XRT, now s/p elective abdominoperineal proctectomy for recurrent exophytic anal cancer on 7/24/18    1. Cough:  - Currently stable pulm status  - Would check Sputum Cx  - In the past her SCxs/ BAL cxs have shown Grp B strept, Acinetobacter, Achromobacter, Pasteurella and Serratia  - Her current antibiotic regimen of Aztreonam and Clindamycin will offer sufficient resp coverage for now   - Suggest nebulized hypertonic saline Q12H (preceded by Duonebs) to asssit with mobilization of secretions  - Cont close monitoring of resp symptoms. Optimize pain to prevent resp splinting  - Incentive spirometry/ acapella use/ OBC and early mobilization    2. COPD/ Asthma/ Tracheomalacia s/p tracheobronchoplasty   - Start Duonebs Q6H  - Start Symbicort BID (takes Breo ellipta at home which is non formularly)  - Start Spiriva daily  - Once tolerating PO intake, can restart home dose of Singulair daily  - Supplemental O2 to keep sats > 90 %    3. Type 2 DM:  - Cont sliding insulin and monitor FS    4. Recurrent anal cancer s/p abdominoperineal proctectomy  - Cont care per surgical team     5. Gen:   - DVT Px: Hep SQ  - GI Px: Protonix  - Dr. Allison will follow her during hospitalization

## 2018-07-25 NOTE — DISCHARGE NOTE ADULT - CARE PROVIDER_API CALL
Terrell Luong), ColonRectal Surgery; Surgery  [address] [facility] [address]  [address]  [place] [address]  Phone: [phone]  Fax: [phone] Terrell Luong), ColonRectal Surgery; Surgery  [address] [facility]  [address]  [place] [address]  Phone: [phone]  Fax: [phone]    Eulalio Allison), Internal Medicine; Pulmonary Disease  [address] [facility]  [address]  [place] [address]  Phone: [phone]  Fax: [phone]    Baron Tristan), Cardiovascular Disease; Internal Medicine  [address]  [address] [place] [address]  Phone: [phone]  Fax: [phone]

## 2018-07-25 NOTE — DISCHARGE NOTE ADULT - MEDICATION SUMMARY - MEDICATIONS TO TAKE
I will START or STAY ON the medications listed below when I get home from the hospital:    free text- insulin lispro sliding scale  -- 1 Unit(s) if Glucose 151 - 200  2 Unit(s) if Glucose 201 - 250  3 Unit(s) if Glucose 251 - 300  4 Unit(s) if Glucose 301 - 350  5 Unit(s) if Glucose 351 - 400  6 Unit(s) if Glucose Greater Than 400  -- Indication: For Diabetes    free text- insulin lispro sliding scale  -- 0 Unit(s) if Glucose 61 - 250  2 Unit(s) if Glucose 251 - 300  4 Unit(s) if Glucose 301 - 350  6 Unit(s) if Glucose 351 - 400  8 Unit(s) if Glucose Greater Than 400  -- Indication: For Diabetes    methylPREDNISolone 4 mg oral tablet  -- 1 tab(s) by mouth once a day  -- Indication: For adrenal insufficiency    acetaminophen 325 mg oral tablet  -- 2 tab(s) by mouth every 6 hours, As needed, Moderate Pain (4 - 6)  -- Indication: For pain    digoxin 250 mcg (0.25 mg) oral tablet  -- 1 tab(s) by mouth once a day  -- Indication: For afib    apixaban 5 mg oral tablet  -- 1 tab(s) by mouth every 12 hours  -- Indication: For afib    Lyrica 150 mg oral capsule  -- 1 cap(s) by mouth 2 times a day  -- Indication: For Home medication    Xyzal 5 mg oral tablet  -- 1 tab(s) by mouth once a day (in the evening), As Needed  -- Indication: For antihistamine    Breo Ellipta 200 mcg-25 mcg/inh inhalation powder  -- 1 puff(s) inhaled once a day  -- Indication: For bronchodilator    budesonide-formoterol 160 mcg-4.5 mcg/inh inhalation aerosol  -- 2 puff(s) inhaled 2 times a day  -- Indication: For bronchodilator    ipratropium-albuterol 0.5 mg-2.5 mg/3 mLinhalation solution  -- 3 milliliter(s) inhaled every 6 hours  -- Indication: For bronchodilator    tiotropium 18 mcg inhalation capsule  -- 1 cap(s) inhaled once a day  -- Indication: For bronchodilator    glucose 50% intravenous solution  -- 25 milliliter(s) intravenous once prn hypoglycemia  -- Indication: For Hypoglycemia    Xolair 150 mg subcutaneous injection  -- subcutaneous every 2 weeks- to start after rehab  -- Indication: For immunosuppressant    Singulair 10 mg oral tablet  -- 1 tab(s) by mouth once a day  -- Indication: For asthma    Protonix 40 mg oral delayed release tablet  -- 1 tab(s) by mouth once a day  -- Indication: For Reflux    Vitamin C 500 mg oral capsule  -- 1 cap(s) by mouth once a day  -- Indication: For Supplement    Vitamin D3 5000 intl units oral capsule  -- 1 cap(s) by mouth once a day  -- Indication: For Supplement I will START or STAY ON the medications listed below when I get home from the hospital:    free text- insulin lispro sliding scale  -- 1 Unit(s) if Glucose 151 - 200  2 Unit(s) if Glucose 201 - 250  3 Unit(s) if Glucose 251 - 300  4 Unit(s) if Glucose 301 - 350  5 Unit(s) if Glucose 351 - 400  6 Unit(s) if Glucose Greater Than 400  -- Indication: For Diabetes    free text- insulin lispro sliding scale  -- 0 Unit(s) if Glucose 61 - 250  2 Unit(s) if Glucose 251 - 300  4 Unit(s) if Glucose 301 - 350  6 Unit(s) if Glucose 351 - 400  8 Unit(s) if Glucose Greater Than 400  -- Indication: For Diabetes    methylPREDNISolone 4 mg oral tablet  -- 1 tab(s) by mouth once a day  -- Indication: For adrenal insufficiency    acetaminophen 325 mg oral tablet  -- 2 tab(s) by mouth every 6 hours, As needed, Moderate Pain (4 - 6)  -- Indication: For pain    digoxin 250 mcg (0.25 mg) oral tablet  -- 1 tab(s) by mouth once a day  -- Indication: For afib    apixaban 5 mg oral tablet  -- 1 tab(s) by mouth every 12 hours  -- Indication: For afib    Lyrica 150 mg oral capsule  -- 1 cap(s) by mouth 2 times a day  -- Indication: For Home medication    Xyzal 5 mg oral tablet  -- 1 tab(s) by mouth once a day (in the evening), As Needed  -- Indication: For antihistamine    Breo Ellipta 200 mcg-25 mcg/inh inhalation powder  -- 1 puff(s) inhaled once a day  -- Indication: For bronchodilator    budesonide-formoterol 160 mcg-4.5 mcg/inh inhalation aerosol  -- 2 puff(s) inhaled 2 times a day  -- Indication: For bronchodilator    ipratropium-albuterol 0.5 mg-2.5 mg/3 mLinhalation solution  -- 3 milliliter(s) inhaled every 6 hours  -- Indication: For bronchodilator    tiotropium 18 mcg inhalation capsule  -- 1 cap(s) inhaled once a day  -- Indication: For bronchodilator    glucose 50% intravenous solution  -- 25 milliliter(s) intravenous once prn hypoglycemia  -- Indication: For Hypoglycemia    Xolair 150 mg subcutaneous injection  -- subcutaneous every 2 weeks- to start after rehab  -- Indication: For immunosuppressant    Singulair 10 mg oral tablet  -- 1 tab(s) by mouth once a day  -- Indication: For asthma    Pyridium 200 mg oral tablet  -- 1 tab(s) by mouth 3 times a day (after meals)  -- Indication: For bladder spasm    Protonix 40 mg oral delayed release tablet  -- 1 tab(s) by mouth once a day  -- Indication: For Reflux    Vitamin C 500 mg oral capsule  -- 1 cap(s) by mouth once a day  -- Indication: For Supplement    Vitamin D3 5000 intl units oral capsule  -- 1 cap(s) by mouth once a day  -- Indication: For Supplement

## 2018-07-25 NOTE — DISCHARGE NOTE ADULT - CARE PLAN
Principal Discharge DX:	Colorectal cancer  Goal:	return to daily living activity prior to surgery, postoperative recovery  Assessment and plan of treatment:	WOUND CARE: Staples will be removed at follow up office visit.  Ostomy care as instructed by nursing staff.    BATHING: Please do not submerge wound underwater. You may shower and/or sponge bathe.  ACTIVITY: No heavy lifting anything more than 10-15lbs or straining. Otherwise, you may return to your usual level of physical activity. If you are taking narcotic pain medication (such as Percocet), do NOT drive a car, operate machinery or make important decisions.  DIET: Low fiber diet  NOTIFY YOUR SURGEON IF: You have any bleeding that does not stop, any pus draining from your wound, any fever (over 100.4 F) or chills, persistent nausea/vomiting with inability to tolerate food or liquids, excessive ostomy output or lack of ostomy output or if your pain is not controlled on your discharge pain medications.  FOLLOW-UP:  1. Please call to make a follow-up appointment within one week of discharge   2. Please follow up with your primary care physician in one week regarding your hospitalization. Principal Discharge DX:	Colorectal cancer  Goal:	return to daily living activity prior to surgery, postoperative recovery  Assessment and plan of treatment:	WOUND CARE: Staples will be removed at follow up office visit.  Ostomy care as instructed by nursing staff.  VAC changes MWF.    BATHING: Please do not submerge wound underwater. You may shower and/or sponge bathe.  ACTIVITY: No heavy lifting anything more than 10-15lbs or straining. Otherwise, you may return to your usual level of physical activity. If you are taking narcotic pain medication (such as Percocet), do NOT drive a car, operate machinery or make important decisions.  DIET: Low fiber diet  NOTIFY YOUR SURGEON IF: You have any bleeding that does not stop, any pus draining from your wound, any fever (over 100.4 F) or chills, persistent nausea/vomiting with inability to tolerate food or liquids, excessive ostomy output or lack of ostomy output or if your pain is not controlled on your discharge pain medications.  FOLLOW-UP:  1. Please call to make a follow-up appointment within one week of discharge   2. Please follow up with your primary care physician in one week regarding your hospitalization.

## 2018-07-25 NOTE — DISCHARGE NOTE ADULT - CARE PROVIDERS DIRECT ADDRESSES
,[email or web address]

## 2018-07-25 NOTE — DISCHARGE NOTE ADULT - PATIENT PORTAL LINK FT
You can access the [employer] Patient Portal, offered by [facility], by registering with the following website: [email or web address]

## 2018-07-25 NOTE — CONSULT NOTE ADULT - SUBJECTIVE AND OBJECTIVE BOX
CHIEF COMPLAINT:    HPI:    PAST MEDICAL & SURGICAL HISTORY:  TIA (transient ischemic attack): multiple, last 5 years ago - presents as right-sided weakness  DVT (deep venous thrombosis): 15-20 years ago, took coumadin  Seizure: x 1 1/7/18  Rectal bleeding  Colorectal cancer: 4/2018- last treatment , chemo and radiation  Tracheobronchomalacia: diagnosed 2015, s/p bronchial thermoplasty 2016 (Dr Zapien); recent bronchoscopy 6/5/2018 revealed no evidence of tracheobronchomalacia in trachea or bronchial tubes  Asthma  Pelvic fracture  Aortic insufficiency: moderate AR on echo 5/3/2018  Adrenal insufficiency: Medrol daily for over 50 years  COPD (chronic obstructive pulmonary disease)  Diabetes: Type 2  Atrial fibrillation: paroxysmal, on eliquis  Rectal bleeding: exam under anesthesia ([facility]) 2/2018  S/P bronchoscopy: 6/5/2018 - [facility] (Dr Zapien) no evidence of tracheobronchomalacia in trachea or bronchial tubes  History of tracheomalacia: 2015 - attempted tracheal stenting ([facility])- course complicated by obstruction, respiratory failure, multiple CPR attempts -  stent discontinued; 10/20/2016 Tracheobronchoplasty (Prolene Mesh) performed at [facility] by Dr Zapien  H/O pelvic surgery: 5 years ago - s/p fracture  Exostosis of orbit, 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  History of sinus surgery: multiple sinus surgeries  H/O total knee replacement, bilateral: 5 years ago  History of partial hysterectomy: 30 years ago - fibroids      FAMILY HISTORY:  Family history of diabetes mellitus type II  Family history of breast cancer (Sibling)  Family history of asthma      SOCIAL HISTORY:  Smoking: [ ] Never Smoked [ ] Former Smoker (__ packs x ___ years) [ ] Current Smoker  (__ packs x ___ years)  Substance Use: [ ] Never Used [ ] Used ____  EtOH Use:  Marital Status: [ ] Single [ ]  [ ]  [ ]   Sexual History:   Occupation:  Recent Travel:  Country of Birth:  Advance Directives:    Allergies    ampicillin (Short breath)  aspirin (Short breath)  Avelox (Short breath; Pruritus)  codeine (Short breath)  Dilaudid (Short breath)  iodine (Short breath; Swelling)  penicillin (Short breath)  shellfish (Anaphylaxis)  tetanus toxoid (Short breath)  Valium (Short breath)    Intolerances        HOME MEDICATIONS:    REVIEW OF SYSTEMS:  Constitutional: [ ] negative [ ] fevers [ ] chills [ ] weight loss [ ] weight gain  HEENT: [ ] negative [ ] dry eyes [ ] eye irritation [ ] postnasal drip [ ] nasal congestion  CV: [ ] negative  [ ] chest pain [ ] orthopnea [ ] palpitations [ ] murmur  Resp: [ ] negative [ ] cough [ ] shortness of breath [ ] dyspnea [ ] wheezing [ ] sputum [ ] hemoptysis  GI: [ ] negative [ ] nausea [ ] vomiting [ ] diarrhea [ ] constipation [ ] abd pain [ ] dysphagia   : [ ] negative [ ] dysuria [ ] nocturia [ ] hematuria [ ] increased urinary frequency  Musculoskeletal: [ ] negative [ ] back pain [ ] myalgias [ ] arthralgias [ ] fracture  Skin: [ ] negative [ ] rash [ ] itch  Neurological: [ ] negative [ ] headache [ ] dizziness [ ] syncope [ ] weakness [ ] numbness  Psychiatric: [ ] negative [ ] anxiety [ ] depression  Endocrine: [ ] negative [ ] diabetes [ ] thyroid problem  Hematologic/Lymphatic: [ ] negative [ ] anemia [ ] bleeding problem  Allergic/Immunologic: [ ] negative [ ] itchy eyes [ ] nasal discharge [ ] hives [ ] angioedema  [ ] All other systems negative  [ ] Unable to assess ROS because ________    OBJECTIVE:  ICU Vital Signs Last 24 Hrs  T(C): 36.6 (25 Jul 2018 11:03), Max: 37.1 (25 Jul 2018 07:35)  T(F): 97.9 (25 Jul 2018 11:03), Max: 98.7 (25 Jul 2018 07:35)  HR: 69 (25 Jul 2018 11:03) (61 - 86)  BP: 109/64 (25 Jul 2018 11:03) (91/54 - 132/59)  BP(mean): 78 (25 Jul 2018 07:00) (67 - 86)  ABP: 103/50 (25 Jul 2018 06:00) (76/50 - 125/52)  ABP(mean): 63 (25 Jul 2018 06:00) (59 - 79)  RR: 18 (25 Jul 2018 11:03) (15 - 18)  SpO2: 99% (25 Jul 2018 11:03) (95% - 100%)        07-24 @ 07:01  -  07-25 @ 07:00  --------------------------------------------------------  IN: 2200 mL / OUT: 1200 mL / NET: 1000 mL    07-25 @ 07:01  - 07-25 @ 13:05  --------------------------------------------------------  IN: 0 mL / OUT: 550 mL / NET: -550 mL      CAPILLARY BLOOD GLUCOSE      POCT Blood Glucose.: 154 mg/dL (24 Jul 2018 20:15)      PHYSICAL EXAM:  General:   HEENT:   Lymph Nodes:  Neck:   Respiratory:   Cardiovascular:   Abdomen:   Extremities:   Skin:   Neurological:  Psychiatry:    HOSPITAL MEDICATIONS:  heparin  Injectable 5000 Unit(s) SubCutaneous every 8 hours    aztreonam  IVPB 2000 milliGRAM(s) IV Intermittent once  clindamycin IVPB 900 milliGRAM(s) IV Intermittent once      dextrose 40% Gel 15 Gram(s) Oral once PRN  dextrose 50% Injectable 12.5 Gram(s) IV Push once  dextrose 50% Injectable 25 Gram(s) IV Push once  dextrose 50% Injectable 25 Gram(s) IV Push once  glucagon  Injectable 1 milliGRAM(s) IntraMuscular once PRN  insulin lispro (HumaLOG) corrective regimen sliding scale   SubCutaneous every 6 hours    ALBUTerol    90 MICROgram(s) HFA Inhaler 2 Puff(s) Inhalation every 6 hours PRN  diphenhydrAMINE   Injectable 12.5 milliGRAM(s) IV Push every 4 hours PRN  tiotropium 18 MICROgram(s) Capsule 1 Capsule(s) Inhalation daily    morphine PCA (5 mG/mL) 30 milliLiter(s) PCA Continuous PCA Continuous  morphine PCA (5 mG/mL) Rescue Clinician Bolus 2.5 milliGRAM(s) IV Push every 15 minutes PRN    pantoprazole  Injectable 40 milliGRAM(s) IV Push daily        dextrose 5% + sodium chloride 0.45%. 1000 milliLiter(s) IV Continuous <Continuous>  dextrose 5%. 1000 milliLiter(s) IV Continuous <Continuous>        naloxone Injectable 0.1 milliGRAM(s) IV Push every 3 minutes PRN      LABS:                        11.4   14.8  )-----------( 201      ( 25 Jul 2018 04:32 )             37.5     Hgb Trend: 11.4<--, 11.4<--, 11.8<--, 13.1<--  07-25    142  |  106  |  10  ----------------------------<  191<H>  4.6   |  26  |  0.77    Ca    8.2<L>      25 Jul 2018 04:32  Phos  4.2     07-25  Mg     2.1     07-25      Creatinine Trend: 0.77<--, 0.73<--, 0.78<--            MICROBIOLOGY:     RADIOLOGY:  [ ] Reviewed and interpreted by me    PULMONARY FUNCTION TESTS:    EKG: CHIEF COMPLAINT: Admitted for elective surgery     HPI: 67 yr F with PMH of COPD/asthma, tracheobronchomalacia s/p tracheo-bronchoplasty in 10/16, Afib on Eliquis , Adrenal Insufficieny on steroids,  DM2, HTN, Squamous cell CA of the anus on chemo/XRT, now s/p elective abdominoperineal proctectomy for recurrent exophytic anal cancer on 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 Currently patient reports that she is doing well. Her pain is well controlled with the Morphine PCA. She denies any SOB, F/C, N/V/D. she does endorse a cough productive of thick yellow sputum which is a little worse than her baseline cough. In the past her SCxs/ BAL cxs have shown Grp B strept, Acinetobacter, Achromobacter, Pasteurella and Serratia.        PAST MEDICAL & SURGICAL HISTORY:  TIA (transient ischemic attack): multiple, last 5 years ago - presents as right-sided weakness  DVT (deep venous thrombosis): 15-20 years ago, took coumadin  Seizure: x 1 1/7/18  Rectal bleeding  Colorectal cancer: 4/2018- last treatment , chemo and radiation  Tracheobronchomalacia: diagnosed 2015, s/p bronchial thermoplasty 2016 (Dr Zapien); recent bronchoscopy 6/5/2018 revealed no evidence of tracheobronchomalacia in trachea or bronchial tubes  Asthma  Pelvic fracture  Aortic insufficiency: moderate AR on echo 5/3/2018  Adrenal insufficiency: Medrol daily for over 50 years  COPD (chronic obstructive pulmonary disease)  Diabetes: Type 2  Atrial fibrillation: paroxysmal, on eliquis  Rectal bleeding: exam under anesthesia ([facility]) 2/2018  S/P bronchoscopy: 6/5/2018 - [facility] (Dr Zapien) no evidence of tracheobronchomalacia in trachea or bronchial tubes  History of tracheomalacia: 2015 - attempted tracheal stenting ([facility])- course complicated by obstruction, respiratory failure, multiple CPR attempts -  stent discontinued; 10/20/2016 Tracheobronchoplasty (Prolene Mesh) performed at [facility] by Dr Zapien  H/O pelvic surgery: 5 years ago - s/p fracture  Exostosis of orbit, 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  History of sinus surgery: multiple sinus surgeries  H/O total knee replacement, bilateral: 5 years ago  History of partial hysterectomy: 30 years ago - fibroids      FAMILY HISTORY:  Family history of diabetes mellitus type II  Family history of breast cancer (Sibling)  Family history of asthma      SOCIAL HISTORY:  Smoking: [ x] Never Smoked [ ] Former Smoker (__ packs x ___ years) [ ] Current Smoker  (__ packs x ___ years)  Substance Use: [x ] Never Used [ ] Used ____  EtOH Use: Social   Marital Status: [] Single [ ]  [ ]  [x ]   Sexual History: NC  Occupation: Retired   Recent Travel: None  Advance Directives: Full code     Allergies    ampicillin (Short breath)  aspirin (Short breath)  Avelox (Short breath; Pruritus)  codeine (Short breath)  Dilaudid (Short breath)  iodine (Short breath; Swelling)  penicillin (Short breath)  shellfish (Anaphylaxis)  tetanus toxoid (Short breath)  Valium (Short breath)    Intolerances        HOME MEDICATIONS:  · 	apixaban 5 mg oral tablet: Last Dose Taken:  , 1 tab(s) orally every 12 hours  · 	albuterol CFC free 90 mcg/inh inhalation aerosol: Last Dose Taken:  , 1 puff(s) inhaled every 4 hours, As Needed - for shortness of breath and/or wheezing  · 	digoxin 250 mcg (0.25 mg) oral tablet: Last Dose Taken:  , 1 tab(s) orally once a day  · 	pantoprazole 40 mg oral delayed release tablet: Last Dose Taken:  , 1 tab(s) orally once a day (before a meal)  · 	Spiriva 18 mcg inhalation capsule: Last Dose Taken:  , 1 cap(s) inhaled once a day  · 	Breo Ellipta 200 mcg-25 mcg/inh inhalation powder: Last Dose Taken:  , 1 puff(s) inhaled once a day  · 	Xyzal 5 mg oral tablet: Last Dose Taken:  , 1 tab(s) orally once a day (in the evening), As Needed  · 	Xolair 150 mg subcutaneous injection: Last Dose Taken:  , subcutaneous every 2 weeks  · 	methylPREDNISolone 4 mg oral tablet: Last Dose Taken:  , 1 tab(s) orally once a day  · 	Victoza 18 mg/3 mL subcutaneous solution: Last Dose Taken:  , 1.8 milligram(s) subcutaneous once a day (in the morning)  · 	Singulair 10 mg oral tablet: Last Dose Taken:  , 1 tab(s) orally once a day  · 	albuterol 2.5 mg/3 mL (0.083%) inhalation solution: Last Dose Taken:  , 3 milliliter(s) inhaled every 6 hours  · 	Diovan 40 mg oral tablet: Last Dose Taken:  , 0.5 tab(s) orally once a day  · 	Lantus 100 units/mL subcutaneous solution: Last Dose Taken:  , 12 unit(s) subcutaneous once a day (at bedtime)  · 	NovoLOG 100 units/mL injectable solution: Last Dose Taken:  , depending on carb count 3 times a day with meals  · 	Lyrica 150 mg oral capsule: Last Dose Taken:  , 1 cap(s) orally 2 times a day  · 	Vitamin C 500 mg oral capsule: 1 cap(s) orally once a day  · 	Vitamin D3 5000 intl units oral capsule: 1 cap(s) orally once a day    REVIEW OF SYSTEMS:  Constitutional: [ ] negative [- ] fevers [- ] chills [- ] weight loss [ ] weight gain  HEENT: [ ] negative [ ] dry eyes [- ] eye irritation [- ] postnasal drip [ ] nasal congestion  CV: [ ] negative  [- ] chest pain [- ] orthopnea [ ] palpitations [ ] murmur  Resp: [ ] negative [+ ] cough [- ] shortness of breath [ ] dyspnea [- ] wheezing [+ ] sputum [ ] hemoptysis  GI: [ ] negative [- ] nausea [- ] vomiting [- ] diarrhea [- ] constipation [+ ] abd pain [ ] dysphagia   : [ ] negative [- ] dysuria [ -] nocturia [- ] hematuria [- ] increased urinary frequency  Musculoskeletal: [ ] negative [ ] back pain [ -] myalgias [- ] arthralgias [ ] fracture  Skin: [ ] negative -[ ] rash [ ] itch  Neurological: [ ] negative -[ ] headache [- ] dizziness [- ] syncope [ ] weakness [ ] numbness  Psychiatric: [ ] negative [- ] anxiety [ ] depression  Endocrine: [ ] negative [+ ] diabetes [ ] thyroid problem  Hematologic/Lymphatic: [ ] negative [ ] anemia [- ] bleeding problem  Allergic/Immunologic: [ ] negative [ ] itchy eyes [- ] nasal discharge [ ] hives [ ] angioedema  [x ] All other systems negative  [ ] Unable to assess ROS because ________    OBJECTIVE:  ICU Vital Signs Last 24 Hrs  T(C): 36.6 (25 Jul 2018 11:03), Max: 37.1 (25 Jul 2018 07:35)  T(F): 97.9 (25 Jul 2018 11:03), Max: 98.7 (25 Jul 2018 07:35)  HR: 69 (25 Jul 2018 11:03) (61 - 86)  BP: 109/64 (25 Jul 2018 11:03) (91/54 - 132/59)  BP(mean): 78 (25 Jul 2018 07:00) (67 - 86)  ABP: 103/50 (25 Jul 2018 06:00) (76/50 - 125/52)  ABP(mean): 63 (25 Jul 2018 06:00) (59 - 79)  RR: 18 (25 Jul 2018 11:03) (15 - 18)  SpO2: 99% (25 Jul 2018 11:03) (95% - 100%)        07-24 @ 07:01  -  07-25 @ 07:00  --------------------------------------------------------  IN: 2200 mL / OUT: 1200 mL / NET: 1000 mL    07-25 @ 07:01  -  07-25 @ 13:05  --------------------------------------------------------  IN: 0 mL / OUT: 550 mL / NET: -550 mL      CAPILLARY BLOOD GLUCOSE      POCT Blood Glucose.: 154 mg/dL (24 Jul 2018 20:15)      PHYSICAL EXAM:  General: NAD, speaking in full sentences  HEENT: PERRLA  Lymph Nodes: No palpable cervical LNs  Neck: Supple  Respiratory: Slightly decreased BS b/l bases, scattered rhonchi b/l, no crackles   Cardiovascular: Irregularly irregular, S1+S2+0  Abdomen: Soft, mild distension and tenderness around surgical site, L ostomy in place, with a midline drain. BS hypoactive  Extremities: No edema, pulses + b/l LEs  Skin: No rash  Neurological: AAOx3, grossly non focal   Psychiatry: Normal mood     HOSPITAL MEDICATIONS:  heparin  Injectable 5000 Unit(s) SubCutaneous every 8 hours    aztreonam  IVPB 2000 milliGRAM(s) IV Intermittent once  clindamycin IVPB 900 milliGRAM(s) IV Intermittent once      dextrose 40% Gel 15 Gram(s) Oral once PRN  dextrose 50% Injectable 12.5 Gram(s) IV Push once  dextrose 50% Injectable 25 Gram(s) IV Push once  dextrose 50% Injectable 25 Gram(s) IV Push once  glucagon  Injectable 1 milliGRAM(s) IntraMuscular once PRN  insulin lispro (HumaLOG) corrective regimen sliding scale   SubCutaneous every 6 hours    ALBUTerol    90 MICROgram(s) HFA Inhaler 2 Puff(s) Inhalation every 6 hours PRN  diphenhydrAMINE   Injectable 12.5 milliGRAM(s) IV Push every 4 hours PRN  tiotropium 18 MICROgram(s) Capsule 1 Capsule(s) Inhalation daily    morphine PCA (5 mG/mL) 30 milliLiter(s) PCA Continuous PCA Continuous  morphine PCA (5 mG/mL) Rescue Clinician Bolus 2.5 milliGRAM(s) IV Push every 15 minutes PRN    pantoprazole  Injectable 40 milliGRAM(s) IV Push daily        dextrose 5% + sodium chloride 0.45%. 1000 milliLiter(s) IV Continuous <Continuous>  dextrose 5%. 1000 milliLiter(s) IV Continuous <Continuous>        naloxone Injectable 0.1 milliGRAM(s) IV Push every 3 minutes PRN      LABS:                        11.4   14.8  )-----------( 201      ( 25 Jul 2018 04:32 )             37.5     Hgb Trend: 11.4<--, 11.4<--, 11.8<--, 13.1<--  07-25    142  |  106  |  10  ----------------------------<  191<H>  4.6   |  26  |  0.77    Ca    8.2<L>      25 Jul 2018 04:32  Phos  4.2     07-25  Mg     2.1     07-25      Creatinine Trend: 0.77<--, 0.73<--, 0.78<--            MICROBIOLOGY:   None    RADIOLOGY:  < from: Xray Chest 1 View- PORTABLE-Urgent (06.05.18 @ 16:02) >  An AP portable view of the chest revealed cardiomegaly. Trachea midline.   No pneumothorax seen. Trace right pleural effusion. Increased markings in   the left lower lobe. Right Port-A-Cath with the tip in the right atrium.    < from: MR Pelvis w/wo IV Cont (07.05.18 @ 19:14) >  TUMOR LOCATION (Quadrant): Posterior  DISTANCE OF MOST INFERIOR PORTION OF LESION TO THE ANAL VERGE: Tumor is   currently present at the angle verge.  CRANIOCAUDAL EXTENT FROM MOST INFERIOR PORTION OF THE TUMOR: 1.7 cm    TUMOR SIZE (AP x Transverse x Craniocaudal) /CHARACTERISTICS:   -Tumor is noted at the level of the anal verge (8:26, 7:16) measuring 1.8   x 0.8 x 1.7 cm. In retrospect, this is stable in size from prior study   5/29/2018 however mild increase in size or prior study 3/3/2018. Tumor   demonstrates intermediate T2 signal with avid enhancement.    -A previously noted more superior lesion involving the superior aspect of   the right internal anal sphincter is no longer identified.    Previously noted superior lesion exophytic to the rectum (8:17, 7:3)   measures 1.2 x 0.9 x 1.0 cm and demonstrates internal T2 signal with   peripheral enhancement.. This is not significantly changed in size.    INVASION OF SPHINCTER COMPLEX: Yes  If yes, please describe: Tumor at the anal verge involves the internal   sphincter.    T CATEGORY: T1: Tumor less than 2 cm in greatest dimension    REGIONAL LYMPH NODES (suspicious = irregular border, mixed signal   intensity and/or >5mm): N0: No regional lymph node metastases    ADDITIONAL COMMENTS: Hysterectomy.    < end of copied text >      [ ] Reviewed and interpreted by me    PULMONARY FUNCTION TESTS:    EKG:

## 2018-07-25 NOTE — DISCHARGE NOTE ADULT - PLAN OF CARE
return to daily living activity prior to surgery, postoperative recovery WOUND CARE: Staples will be removed at follow up office visit.  Ostomy care as instructed by nursing staff.    BATHING: Please do not submerge wound underwater. You may shower and/or sponge bathe.  ACTIVITY: No heavy lifting anything more than 10-15lbs or straining. Otherwise, you may return to your usual level of physical activity. If you are taking narcotic pain medication (such as Percocet), do NOT drive a car, operate machinery or make important decisions.  DIET: Low fiber diet  NOTIFY YOUR SURGEON IF: You have any bleeding that does not stop, any pus draining from your wound, any fever (over 100.4 F) or chills, persistent nausea/vomiting with inability to tolerate food or liquids, excessive ostomy output or lack of ostomy output or if your pain is not controlled on your discharge pain medications.  FOLLOW-UP:  1. Please call to make a follow-up appointment within one week of discharge   2. Please follow up with your primary care physician in one week regarding your hospitalization. WOUND CARE: Staples will be removed at follow up office visit.  Ostomy care as instructed by nursing staff.  VAC changes MWF.    BATHING: Please do not submerge wound underwater. You may shower and/or sponge bathe.  ACTIVITY: No heavy lifting anything more than 10-15lbs or straining. Otherwise, you may return to your usual level of physical activity. If you are taking narcotic pain medication (such as Percocet), do NOT drive a car, operate machinery or make important decisions.  DIET: Low fiber diet  NOTIFY YOUR SURGEON IF: You have any bleeding that does not stop, any pus draining from your wound, any fever (over 100.4 F) or chills, persistent nausea/vomiting with inability to tolerate food or liquids, excessive ostomy output or lack of ostomy output or if your pain is not controlled on your discharge pain medications.  FOLLOW-UP:  1. Please call to make a follow-up appointment within one week of discharge   2. Please follow up with your primary care physician in one week regarding your hospitalization.

## 2018-07-25 NOTE — PROGRESS NOTE ADULT - SUBJECTIVE AND OBJECTIVE BOX
Day 1 of Anesthesia Pain Management Service    SUBJECTIVE: Patient is doing well with IV PCA    Pain Scale Score:	[X] Refer to charted pain scores    THERAPY:    [ X] IV PCA Morphine		[ X] 5 mg/mL	[ ] 1 mg/mL  [ ] IV PCA Hydromorphone	[ ] 5 mg/mL	[ ] 1 mg/mL  [ ] IV PCA Fentanyl		[ ] 50 micrograms/mL    Demand dose: 1 mg     Lockout: 6 minutes   Continuous Rate: 0 mg/hr  4 Hour Limit: 12 mg    MEDICATIONS  (STANDING):  aztreonam  IVPB 2000 milliGRAM(s) IV Intermittent once  clindamycin IVPB 900 milliGRAM(s) IV Intermittent once  dextrose 5% + sodium chloride 0.45%. 1000 milliLiter(s) (75 mL/Hr) IV Continuous <Continuous>  dextrose 5%. 1000 milliLiter(s) (50 mL/Hr) IV Continuous <Continuous>  dextrose 50% Injectable 12.5 Gram(s) IV Push once  dextrose 50% Injectable 25 Gram(s) IV Push once  dextrose 50% Injectable 25 Gram(s) IV Push once  heparin  Injectable 5000 Unit(s) SubCutaneous every 8 hours  insulin lispro (HumaLOG) corrective regimen sliding scale   SubCutaneous every 6 hours  morphine PCA (5 mG/mL) 30 milliLiter(s) PCA Continuous PCA Continuous  pantoprazole  Injectable 40 milliGRAM(s) IV Push daily  tiotropium 18 MICROgram(s) Capsule 1 Capsule(s) Inhalation daily    MEDICATIONS  (PRN):  ALBUTerol    90 MICROgram(s) HFA Inhaler 2 Puff(s) Inhalation every 6 hours PRN Shortness of Breath  dextrose 40% Gel 15 Gram(s) Oral once PRN Blood Glucose LESS THAN 70 milliGRAM(s)/deciliter  diphenhydrAMINE   Injectable 12.5 milliGRAM(s) IV Push every 4 hours PRN Itching  glucagon  Injectable 1 milliGRAM(s) IntraMuscular once PRN Glucose LESS THAN 70 milligrams/deciliter  morphine PCA (5 mG/mL) Rescue Clinician Bolus 2.5 milliGRAM(s) IV Push every 15 minutes PRN for Pain Scale GREATER THAN 6  naloxone Injectable 0.1 milliGRAM(s) IV Push every 3 minutes PRN For ANY of the following changes in patient status:  A. RR LESS THAN 10 breaths per minute, B. Oxygen saturation LESS THAN 90%, C. Sedation score of 6      OBJECTIVE:    Sedation Score:	[ X] Alert	[ ] Drowsy 	[ ] Arousable	[ ] Asleep	[ ] Unresponsive    Side Effects:	[X ] None	[ ] Nausea	[ ] Vomiting	[ ] Pruritus  		[ ] Other:    Vital Signs Last 24 Hrs  T(C): 36.6 (25 Jul 2018 09:32), Max: 37.1 (25 Jul 2018 07:35)  T(F): 97.9 (25 Jul 2018 09:32), Max: 98.7 (25 Jul 2018 07:35)  HR: 71 (25 Jul 2018 09:32) (61 - 86)  BP: 107/65 (25 Jul 2018 09:32) (91/54 - 137/83)  BP(mean): 78 (25 Jul 2018 07:00) (67 - 86)  RR: 18 (25 Jul 2018 09:32) (15 - 19)  SpO2: 100% (25 Jul 2018 09:32) (95% - 100%)    ASSESSMENT/ PLAN    Therapy to  be:               [X] Continued   [ ] Discontinued   [ ] Changed to PRN Analgesics    Documentation and Verification of current medications:   [X] Done	[ ] Not done, not eligible    Comments: Using 1-2x/hr. Reeducated to use

## 2018-07-25 NOTE — PROGRESS NOTE ADULT - SUBJECTIVE AND OBJECTIVE BOX
Pain Management Attending Addendum    SUBJECTIVE:    Therapy:	  [ x] IV PCA	   [ ] Epidural           [ ] s/p Spinal Opoid              [ ] Postpartum infusion	  [ ] Patient controlled regional anesthesia (PCRA)    [ ] prn Analgesics    OBJECTIVE:   [x] No new signs     [ ] Other:    Side Effects:  [x ] None			[ ] Other:    Assessment of Catheter Site:		[ ] Intact		[ ] Other:    ASSESSMENT/PLAN  [x ] Continue current therapy    [ ] Therapy changed to:    [ ] IV PCA       [ ] Epidural     [ ] prn Analgesics     [ ] post partum infusion    Comments:

## 2018-07-25 NOTE — DISCHARGE NOTE ADULT - HOSPITAL COURSE
66 YO F pmh of COPD, asthma, tracheobronchomalacia s/p bronchial thermoplasty (October 2016) s/p followup bronchoscopy 6/5/18, Afib (on Eliquis), HTN, Adrenal Insufficiency (on chronic medrol), DM2, HTN, remote hx of DVT and squamous cell CA of the anus s/p chemo and RT underwent an APR on 7/24/18.  The patient tolerated the procedure well (see operative report for full details). Pt received 2L crystalloid intra-operatively, UOP 800cc. EBL 75cc. Patient became hypotensive to SBP 90s in the PACU, received  500cc bolus of LR and responded to SBP 110s. SICU consulted for hemodynamic monitoring. Pain was controlled with PCA.  She had a wound VAC placed that was changed on a regular schedule.  Her hemodynamics improved and she was transferred to the floor while awaiting bowel function from ostomy.  Dr. Allison pulmonary was consulted and recommended continuing Aztreonam and Clindamycin for ssufficient resp coverage.  She was continued on inhalers.  Cardiology also followed patient during admission.  Sump drain was removed on 7/26.  Amezcua was kept for several days.  Digoxin was resumed per cardiology.  Antibiotics were stopped 7/27.  She had an episode of emesis and NGT was placed.  She was continued on steroids for adrenal insufficiency. With return of ostomy function, diet was advanced from clears to low fiber as tolerated.  Patient was provided education from ET nurses on how to care for ostomy. 66 YO F pmh of COPD, asthma, tracheobronchomalacia s/p bronchial thermoplasty (October 2016) s/p followup bronchoscopy 6/5/18, Afib (on Eliquis), HTN, Adrenal Insufficiency (on chronic medrol), DM2, HTN, remote hx of DVT and squamous cell CA of the anus s/p chemo and RT underwent an APR on 7/24/18.  The patient tolerated the procedure well (see operative report for full details). Pt received 2L crystalloid intra-operatively, UOP 800cc. EBL 75cc. Patient became hypotensive to SBP 90s in the PACU, received  500cc bolus of LR and responded to SBP 110s. SICU consulted for hemodynamic monitoring. Pain was controlled with PCA.  She had a wound VAC placed that was changed on a regular schedule.  Her hemodynamics improved and she was transferred to the floor while awaiting bowel function from ostomy.  Dr. Allison pulmonary was consulted and recommended continuing Aztreonam and Clindamycin for ssufficient resp coverage.  She was continued on inhalers.  Cardiology also followed patient during admission.  Sump drain was removed on 7/26.  Amezcua was kept for several days.  Digoxin was resumed per cardiology.  Antibiotics were stopped 7/27.  She had an episode of emesis and NGT was placed.  She was continued on steroids for adrenal insufficiency. With return of ostomy function, NGT was removed and diet was advanced from clears to low fiber as tolerated.  Patient was provided education from ET nurses on how to care for ostomy. Patient spiked fever and urine culture revealed a UTI.  She was given ceftriaxone to complete a course    On day of discharge, the patient was tolerating diet, ambulating with improvement and pain controlled. She will follow up with Dr. Luong in 1 week along with Dr. Allison and Dr. Combs. 68 YO F pmh of COPD, asthma, tracheobronchomalacia s/p bronchial thermoplasty (October 2016) s/p followup bronchoscopy 6/5/18, Afib (on Eliquis), HTN, Adrenal Insufficiency (on chronic medrol), DM2, HTN, remote hx of DVT and squamous cell CA of the anus s/p chemo and RT underwent an APR on 7/24/18.  The patient tolerated the procedure well (see operative report for full details). Pt received 2L crystalloid intra-operatively, UOP 800cc. EBL 75cc. Patient became hypotensive to SBP 90s in the PACU, received  500cc bolus of LR and responded to SBP 110s. SICU consulted for hemodynamic monitoring. Pain was controlled with PCA.  She had a wound VAC placed that was changed on a regular schedule.  Her hemodynamics improved and she was transferred to the floor while awaiting bowel function from ostomy.  Dr. Allison pulmonary was consulted and recommended continuing Aztreonam and Clindamycin for sufficient resp coverage.  She was continued on inhalers.  Cardiology also followed patient during admission.  Sump drain was removed on 7/26.  Amezcua was kept for several days.  Digoxin was resumed per cardiology.  Antibiotics were stopped 7/27.  She had an episode of emesis and NGT was placed.  She was continued on steroids for adrenal insufficiency. With return of ostomy function, NGT was removed and diet was advanced from clears to low fiber as tolerated.  Patient was provided education from ET nurses on how to care for ostomy. Patient spiked fever and urine culture revealed a UTI.  She was given ceftriaxone to complete a course  of 7 days of antibiotics. On day of discharge, the patient was tolerating diet, ambulating with improvement and pain controlled. She will follow up with Dr. Luong in 1 week along with Dr. Allison and Dr. Combs.

## 2018-07-25 NOTE — PROGRESS NOTE ADULT - SUBJECTIVE AND OBJECTIVE BOX
Surgery Progress Note    S: Patient seen and examined in PACU. No acute events overnight. Pain well controlled with PCA. Slight nausea, however patient currently does not want Zofran, however will reassess need later. Patient has not had any vomiting. U cath removed.    O:  Vital Signs Last 24 Hrs  T(C): 36.8 (25 Jul 2018 07:00), Max: 36.8 (24 Jul 2018 21:00)  T(F): 98.2 (25 Jul 2018 07:00), Max: 98.2 (24 Jul 2018 21:00)  HR: 79 (25 Jul 2018 06:00) (61 - 86)  BP: 107/53 (25 Jul 2018 06:00) (91/54 - 137/83)  BP(mean): 77 (25 Jul 2018 06:00) (67 - 86)  RR: 16 (25 Jul 2018 06:00) (15 - 19)  SpO2: 100% (25 Jul 2018 06:00) (95% - 100%)    I&O's Detail    24 Jul 2018 07:01  -  25 Jul 2018 07:00  --------------------------------------------------------  IN:    IV PiggyBack: 200 mL    Lactated Ringers IV Bolus: 1000 mL    lactated ringers.: 1000 mL  Total IN: 2200 mL    OUT:    Evacuated Tube System: 110 mL    Indwelling Catheter - Urethral: 1085 mL    VAC (Vacuum Assisted Closure) System: 5 mL  Total OUT: 1200 mL    Total NET: 1000 mL          MEDICATIONS  (STANDING):  aztreonam  IVPB 2000 milliGRAM(s) IV Intermittent once  clindamycin IVPB 900 milliGRAM(s) IV Intermittent once  dextrose 5%. 1000 milliLiter(s) (50 mL/Hr) IV Continuous <Continuous>  dextrose 50% Injectable 12.5 Gram(s) IV Push once  dextrose 50% Injectable 25 Gram(s) IV Push once  dextrose 50% Injectable 25 Gram(s) IV Push once  heparin  Injectable 5000 Unit(s) SubCutaneous every 8 hours  insulin lispro (HumaLOG) corrective regimen sliding scale   SubCutaneous every 6 hours  lactated ringers. 1000 milliLiter(s) (100 mL/Hr) IV Continuous <Continuous>  morphine PCA (5 mG/mL) 30 milliLiter(s) PCA Continuous PCA Continuous  pantoprazole  Injectable 40 milliGRAM(s) IV Push daily  tiotropium 18 MICROgram(s) Capsule 1 Capsule(s) Inhalation daily    MEDICATIONS  (PRN):  ALBUTerol    90 MICROgram(s) HFA Inhaler 2 Puff(s) Inhalation every 6 hours PRN Shortness of Breath  dextrose 40% Gel 15 Gram(s) Oral once PRN Blood Glucose LESS THAN 70 milliGRAM(s)/deciliter  fentaNYL    Injectable 25 MICROGram(s) IV Push every 5 minutes PRN Moderate Pain  fentaNYL    Injectable 50 MICROGram(s) IV Push every 5 minutes PRN Severe Pain  glucagon  Injectable 1 milliGRAM(s) IntraMuscular once PRN Glucose LESS THAN 70 milligrams/deciliter  morphine  - Injectable 2 milliGRAM(s) IV Push every 10 minutes PRN Moderate Pain (4 - 6)  morphine PCA (5 mG/mL) Rescue Clinician Bolus 2.5 milliGRAM(s) IV Push every 15 minutes PRN for Pain Scale GREATER THAN 6  naloxone Injectable 0.1 milliGRAM(s) IV Push every 3 minutes PRN For ANY of the following changes in patient status:  A. RR LESS THAN 10 breaths per minute, B. Oxygen saturation LESS THAN 90%, C. Sedation score of 6  ondansetron Injectable 4 milliGRAM(s) IV Push once PRN Nausea and/or Vomiting                            11.4   14.8  )-----------( 201      ( 25 Jul 2018 04:32 )             37.5       07-25    142  |  106  |  10  ----------------------------<  191<H>  4.6   |  26  |  0.77    Ca    8.2<L>      25 Jul 2018 04:32  Phos  4.2     07-25  Mg     2.1     07-25        Physical Exam:  Gen: Laying in bed, NAD  Resp: Unlabored breathing  Abd: soft, mildly disteneded, appropriately tender around incisions, R/midline drain in place with serosanguinous fluid, L ostomy pink/viable  Anorectal: wound vac in place  Ext: WWP  Skin: No rashes  : u cath removed, ramirez in place with blood tinged urine

## 2018-07-25 NOTE — PROGRESS NOTE ADULT - ASSESSMENT
67 y F with history of anorectal SCC POD1 s/p abdominoperineal proctectomy    -NPO  -change dressing  -maintain ramirez  -maintain wound vac  -encourage IS  -c/w PCA (patient allergic to codeine and dilaudid)  -subq heparin for DVT ppx    Phill Leigh, PGY-1  Red Team Surgery [phone]

## 2018-07-25 NOTE — DISCHARGE NOTE ADULT - NSFTFSERV1RD_GEN_ALL_CORE
teaching and training/medication teaching and assessment/wound care and assessment/other:/observation and assessment

## 2018-07-25 NOTE — DISCHARGE NOTE ADULT - ADDITIONAL INSTRUCTIONS
Please follow up with Dr. Keegan benjamin in 1-2 weeks (See below for office information to call for an appointment)  Please follow up with Dr. Tristan in 1-2 weeks (See below for office information to call for an appointment)

## 2018-07-26 ENCOUNTER — APPOINTMENT (OUTPATIENT)
Dept: THORACIC SURGERY | Facility: CLINIC | Age: 68
End: 2018-07-26

## 2018-07-26 DIAGNOSIS — J39.8 OTHER SPECIFIED DISEASES OF UPPER RESPIRATORY TRACT: ICD-10-CM

## 2018-07-26 DIAGNOSIS — J42 UNSPECIFIED CHRONIC BRONCHITIS: ICD-10-CM

## 2018-07-26 DIAGNOSIS — E11.9 TYPE 2 DIABETES MELLITUS WITHOUT COMPLICATIONS: ICD-10-CM

## 2018-07-26 LAB
ANION GAP SERPL CALC-SCNC: 8 MMOL/L — SIGNIFICANT CHANGE UP (ref 5–17)
BUN SERPL-MCNC: 4 MG/DL — LOW (ref 7–23)
CALCIUM SERPL-MCNC: 8.2 MG/DL — LOW (ref 8.4–10.5)
CHLORIDE SERPL-SCNC: 102 MMOL/L — SIGNIFICANT CHANGE UP (ref 96–108)
CO2 SERPL-SCNC: 29 MMOL/L — SIGNIFICANT CHANGE UP (ref 22–31)
CREAT SERPL-MCNC: 0.67 MG/DL — SIGNIFICANT CHANGE UP (ref 0.5–1.3)
GLUCOSE BLDC GLUCOMTR-MCNC: 159 MG/DL — HIGH (ref 70–99)
GLUCOSE BLDC GLUCOMTR-MCNC: 165 MG/DL — HIGH (ref 70–99)
GLUCOSE BLDC GLUCOMTR-MCNC: 178 MG/DL — HIGH (ref 70–99)
GLUCOSE BLDC GLUCOMTR-MCNC: 191 MG/DL — HIGH (ref 70–99)
GLUCOSE BLDC GLUCOMTR-MCNC: 201 MG/DL — HIGH (ref 70–99)
GLUCOSE BLDC GLUCOMTR-MCNC: 236 MG/DL — HIGH (ref 70–99)
GLUCOSE SERPL-MCNC: 184 MG/DL — HIGH (ref 70–99)
HCT VFR BLD CALC: 38.1 % — SIGNIFICANT CHANGE UP (ref 34.5–45)
HGB BLD-MCNC: 12.2 G/DL — SIGNIFICANT CHANGE UP (ref 11.5–15.5)
MAGNESIUM SERPL-MCNC: 1.9 MG/DL — SIGNIFICANT CHANGE UP (ref 1.6–2.6)
MCHC RBC-ENTMCNC: 24.1 PG — LOW (ref 27–34)
MCHC RBC-ENTMCNC: 32 GM/DL — SIGNIFICANT CHANGE UP (ref 32–36)
MCV RBC AUTO: 75.3 FL — LOW (ref 80–100)
PHOSPHATE SERPL-MCNC: 1.7 MG/DL — LOW (ref 2.5–4.5)
PHOSPHATE SERPL-MCNC: 5.1 MG/DL — HIGH (ref 2.5–4.5)
PLATELET # BLD AUTO: 212 K/UL — SIGNIFICANT CHANGE UP (ref 150–400)
POTASSIUM SERPL-MCNC: 3.6 MMOL/L — SIGNIFICANT CHANGE UP (ref 3.5–5.3)
POTASSIUM SERPL-SCNC: 3.6 MMOL/L — SIGNIFICANT CHANGE UP (ref 3.5–5.3)
RBC # BLD: 5.06 M/UL — SIGNIFICANT CHANGE UP (ref 3.8–5.2)
RBC # FLD: 16.6 % — HIGH (ref 10.3–14.5)
SODIUM SERPL-SCNC: 139 MMOL/L — SIGNIFICANT CHANGE UP (ref 135–145)
WBC # BLD: 11.12 K/UL — HIGH (ref 3.8–10.5)
WBC # FLD AUTO: 11.12 K/UL — HIGH (ref 3.8–10.5)

## 2018-07-26 PROCEDURE — 99232 SBSQ HOSP IP/OBS MODERATE 35: CPT

## 2018-07-26 PROCEDURE — 93010 ELECTROCARDIOGRAM REPORT: CPT

## 2018-07-26 PROCEDURE — 99233 SBSQ HOSP IP/OBS HIGH 50: CPT

## 2018-07-26 RX ORDER — METOCLOPRAMIDE HCL 10 MG
10 TABLET ORAL EVERY 6 HOURS
Qty: 0 | Refills: 0 | Status: DISCONTINUED | OUTPATIENT
Start: 2018-07-26 | End: 2018-08-08

## 2018-07-26 RX ORDER — ACETAMINOPHEN 500 MG
1000 TABLET ORAL ONCE
Qty: 0 | Refills: 0 | Status: COMPLETED | OUTPATIENT
Start: 2018-07-26 | End: 2018-07-26

## 2018-07-26 RX ORDER — BUDESONIDE AND FORMOTEROL FUMARATE DIHYDRATE 160; 4.5 UG/1; UG/1
2 AEROSOL RESPIRATORY (INHALATION)
Qty: 0 | Refills: 0 | Status: DISCONTINUED | OUTPATIENT
Start: 2018-07-26 | End: 2018-08-08

## 2018-07-26 RX ORDER — POTASSIUM PHOSPHATE, MONOBASIC POTASSIUM PHOSPHATE, DIBASIC 236; 224 MG/ML; MG/ML
15 INJECTION, SOLUTION INTRAVENOUS ONCE
Qty: 0 | Refills: 0 | Status: COMPLETED | OUTPATIENT
Start: 2018-07-26 | End: 2018-07-26

## 2018-07-26 RX ORDER — DEXTROSE MONOHYDRATE, SODIUM CHLORIDE, AND POTASSIUM CHLORIDE 50; .745; 4.5 G/1000ML; G/1000ML; G/1000ML
1000 INJECTION, SOLUTION INTRAVENOUS
Qty: 0 | Refills: 0 | Status: DISCONTINUED | OUTPATIENT
Start: 2018-07-26 | End: 2018-07-29

## 2018-07-26 RX ORDER — DIGOXIN 250 MCG
0.25 TABLET ORAL DAILY
Qty: 0 | Refills: 0 | Status: DISCONTINUED | OUTPATIENT
Start: 2018-07-26 | End: 2018-08-08

## 2018-07-26 RX ORDER — HYDROCORTISONE 20 MG
20 TABLET ORAL DAILY
Qty: 0 | Refills: 0 | Status: DISCONTINUED | OUTPATIENT
Start: 2018-07-26 | End: 2018-08-08

## 2018-07-26 RX ORDER — ONDANSETRON 8 MG/1
4 TABLET, FILM COATED ORAL EVERY 6 HOURS
Qty: 0 | Refills: 0 | Status: DISCONTINUED | OUTPATIENT
Start: 2018-07-26 | End: 2018-07-26

## 2018-07-26 RX ADMIN — Medication 2: at 14:05

## 2018-07-26 RX ADMIN — Medication 1: at 06:40

## 2018-07-26 RX ADMIN — POTASSIUM PHOSPHATE, MONOBASIC POTASSIUM PHOSPHATE, DIBASIC 62.5 MILLIMOLE(S): 236; 224 INJECTION, SOLUTION INTRAVENOUS at 09:26

## 2018-07-26 RX ADMIN — MORPHINE SULFATE 30 MILLILITER(S): 50 CAPSULE, EXTENDED RELEASE ORAL at 02:36

## 2018-07-26 RX ADMIN — Medication 12.5 MILLIGRAM(S): at 01:18

## 2018-07-26 RX ADMIN — PANTOPRAZOLE SODIUM 40 MILLIGRAM(S): 20 TABLET, DELAYED RELEASE ORAL at 13:51

## 2018-07-26 RX ADMIN — Medication 3 MILLILITER(S): at 13:52

## 2018-07-26 RX ADMIN — ONDANSETRON 4 MILLIGRAM(S): 8 TABLET, FILM COATED ORAL at 07:58

## 2018-07-26 RX ADMIN — Medication 1: at 01:21

## 2018-07-26 RX ADMIN — Medication 2: at 18:45

## 2018-07-26 RX ADMIN — MORPHINE SULFATE 30 MILLILITER(S): 50 CAPSULE, EXTENDED RELEASE ORAL at 19:09

## 2018-07-26 RX ADMIN — Medication 83.33 MILLIMOLE(S): at 14:11

## 2018-07-26 RX ADMIN — HEPARIN SODIUM 5000 UNIT(S): 5000 INJECTION INTRAVENOUS; SUBCUTANEOUS at 21:04

## 2018-07-26 RX ADMIN — SODIUM CHLORIDE 75 MILLILITER(S): 9 INJECTION, SOLUTION INTRAVENOUS at 06:40

## 2018-07-26 RX ADMIN — HEPARIN SODIUM 5000 UNIT(S): 5000 INJECTION INTRAVENOUS; SUBCUTANEOUS at 14:08

## 2018-07-26 RX ADMIN — Medication 0.25 MILLIGRAM(S): at 14:11

## 2018-07-26 RX ADMIN — HEPARIN SODIUM 5000 UNIT(S): 5000 INJECTION INTRAVENOUS; SUBCUTANEOUS at 06:40

## 2018-07-26 RX ADMIN — BUDESONIDE AND FORMOTEROL FUMARATE DIHYDRATE 2 PUFF(S): 160; 4.5 AEROSOL RESPIRATORY (INHALATION) at 06:41

## 2018-07-26 RX ADMIN — Medication 20 MILLIGRAM(S): at 08:01

## 2018-07-26 RX ADMIN — MORPHINE SULFATE 30 MILLILITER(S): 50 CAPSULE, EXTENDED RELEASE ORAL at 07:54

## 2018-07-26 RX ADMIN — TIOTROPIUM BROMIDE 1 CAPSULE(S): 18 CAPSULE ORAL; RESPIRATORY (INHALATION) at 13:52

## 2018-07-26 RX ADMIN — Medication 400 MILLIGRAM(S): at 21:05

## 2018-07-26 RX ADMIN — DEXTROSE MONOHYDRATE, SODIUM CHLORIDE, AND POTASSIUM CHLORIDE 75 MILLILITER(S): 50; .745; 4.5 INJECTION, SOLUTION INTRAVENOUS at 08:38

## 2018-07-26 RX ADMIN — Medication 4 MILLIGRAM(S): at 06:40

## 2018-07-26 NOTE — PROGRESS NOTE ADULT - SUBJECTIVE AND OBJECTIVE BOX
CHIEF COMPLAINT: f/up for tracheomalacia, asthma, allergies, chronic bronchitis-    Interval Events:    REVIEW OF SYSTEMS:  Constitutional: No fevers or chills. No weight loss. No fatigue or generalized malaise.  Eyes: No itching or discharge from the eyes  ENT: No ear pain. No ear discharge. No nasal congestion. No post nasal drip. No epistaxis. No throat pain. No sore throat. No difficulty swallowing.   CV: No chest pain. No palpitations. No lightheadedness or dizziness.   Resp: No dyspnea at rest. No dyspnea on exertion. No orthopnea. No wheezing. No cough. No stridor. No sputum production. No chest pain with respiration.  GI: No nausea. No vomiting. No diarrhea.  MSK: No joint pain or pain in any extremities  Integumentary: No skin lesions. No pedal edema.  Neurological: No gross motor weakness. No sensory changes.  [ ] All other systems negative  [ ] Unable to assess ROS because ________    OBJECTIVE:  ICU Vital Signs Last 24 Hrs  T(C): 37.4 (26 Jul 2018 00:35), Max: 37.4 (25 Jul 2018 17:13)  T(F): 99.4 (26 Jul 2018 00:35), Max: 99.4 (26 Jul 2018 00:35)  HR: 73 (26 Jul 2018 00:35) (69 - 94)  BP: 120/63 (26 Jul 2018 00:35) (95/56 - 148/71)  BP(mean): 78 (25 Jul 2018 07:00) (77 - 78)  ABP: 103/50 (25 Jul 2018 06:00) (103/50 - 103/50)  ABP(mean): 63 (25 Jul 2018 06:00) (63 - 63)  RR: 18 (26 Jul 2018 00:35) (16 - 18)  SpO2: 96% (26 Jul 2018 00:35) (96% - 100%)        07-24 @ 07:01  -  07-25 @ 07:00  --------------------------------------------------------  IN: 2200 mL / OUT: 1200 mL / NET: 1000 mL    07-25 @ 07:01 - 07-26 @ 05:20  --------------------------------------------------------  IN: 1270 mL / OUT: 2845 mL / NET: -1575 mL      CAPILLARY BLOOD GLUCOSE      POCT Blood Glucose.: 191 mg/dL (26 Jul 2018 01:14)      PHYSICAL EXAM:  General: Awake, alert, oriented X 3.   HEENT: Atraumatic, normocephalic.                 Mallampatti Grade                 No nasal congestion.                No tonsillar or pharyngeal exudates.  Lymph Nodes: No palpable lymphadenopathy  Neck: No JVD. No carotid bruit.   Respiratory: Normal chest expansion                         Normal percussion                         Normal and equal air entry                         No wheeze, rhonchi or rales.  Cardiovascular: S1 S2 normal. No murmurs, rubs or gallops.   Abdomen: Soft, non-tender, non-distended. No organomegaly.  Extremities: Warm to touch. Peripheral pulse palpable. No pedal edema.   Skin: No rashes or skin lesions  Neurological: Motor and sensory examination equal and normal in all four extremities.  Psychiatry: Appropriate mood and affect.    HOSPITAL MEDICATIONS:  MEDICATIONS  (STANDING):  ALBUTerol/ipratropium for Nebulization 3 milliLiter(s) Nebulizer every 6 hours  aztreonam  IVPB 2000 milliGRAM(s) IV Intermittent once  clindamycin IVPB 900 milliGRAM(s) IV Intermittent once  dextrose 5% + sodium chloride 0.45%. 1000 milliLiter(s) (75 mL/Hr) IV Continuous <Continuous>  dextrose 5%. 1000 milliLiter(s) (50 mL/Hr) IV Continuous <Continuous>  dextrose 50% Injectable 12.5 Gram(s) IV Push once  dextrose 50% Injectable 25 Gram(s) IV Push once  dextrose 50% Injectable 25 Gram(s) IV Push once  digoxin  IVPB 0.25 milliGRAM(s) IV Intermittent daily  heparin  Injectable 5000 Unit(s) SubCutaneous every 8 hours  insulin lispro (HumaLOG) corrective regimen sliding scale   SubCutaneous every 6 hours  methylPREDNISolone sodium succinate Injectable 4 milliGRAM(s) IV Push daily  morphine PCA (5 mG/mL) 30 milliLiter(s) PCA Continuous PCA Continuous  pantoprazole  Injectable 40 milliGRAM(s) IV Push daily  tiotropium 18 MICROgram(s) Capsule 1 Capsule(s) Inhalation daily    MEDICATIONS  (PRN):  ALBUTerol    90 MICROgram(s) HFA Inhaler 2 Puff(s) Inhalation every 6 hours PRN Shortness of Breath  dextrose 40% Gel 15 Gram(s) Oral once PRN Blood Glucose LESS THAN 70 milliGRAM(s)/deciliter  diphenhydrAMINE   Injectable 12.5 milliGRAM(s) IV Push every 4 hours PRN Itching  glucagon  Injectable 1 milliGRAM(s) IntraMuscular once PRN Glucose LESS THAN 70 milligrams/deciliter  morphine PCA (5 mG/mL) Rescue Clinician Bolus 2.5 milliGRAM(s) IV Push every 15 minutes PRN for Pain Scale GREATER THAN 6  naloxone Injectable 0.1 milliGRAM(s) IV Push every 3 minutes PRN For ANY of the following changes in patient status:  A. RR LESS THAN 10 breaths per minute, B. Oxygen saturation LESS THAN 90%, C. Sedation score of 6      LABS:                        11.4   14.8  )-----------( 201      ( 25 Jul 2018 04:32 )             37.5     07-25    142  |  106  |  10  ----------------------------<  191<H>  4.6   |  26  |  0.77    Ca    8.2<L>      25 Jul 2018 04:32  Phos  4.2     07-25  Mg     2.1     07-25                MICROBIOLOGY:     RADIOLOGY:  [ ] Reviewed and interpreted by me    Point of Care Ultrasound Findings:    PFT:    EKG: CHIEF COMPLAINT: f/up for tracheomalacia, asthma, allergies, chronic bronchitis-at present itching--good breathing--not much cough-not ambulating as confined to room, minimal pain    Interval Events: none    REVIEW OF SYSTEMS:  Constitutional: No fevers or chills. No weight loss. No fatigue or generalized malaise.  Eyes: No itching or discharge from the eyes  ENT: No ear pain. No ear discharge. No nasal congestion. No post nasal drip. No epistaxis. No throat pain. No sore throat. No difficulty swallowing.   CV: No chest pain. No palpitations. No lightheadedness or dizziness.   Resp: No dyspnea at rest. mild dyspnea on exertion. No orthopnea. No wheezing. No cough. No stridor. No sputum production. No chest pain with respiration.  GI: No nausea. No vomiting. No diarrhea.  MSK: No joint pain or pain in any extremities  Integumentary: No skin lesions. No pedal edema.  Neurological: No gross motor weakness. No sensory changes.  [+] All other systems negative  [ ] Unable to assess ROS because ________    OBJECTIVE:  ICU Vital Signs Last 24 Hrs  T(C): 37.4 (26 Jul 2018 00:35), Max: 37.4 (25 Jul 2018 17:13)  T(F): 99.4 (26 Jul 2018 00:35), Max: 99.4 (26 Jul 2018 00:35)  HR: 73 (26 Jul 2018 00:35) (69 - 94)  BP: 120/63 (26 Jul 2018 00:35) (95/56 - 148/71)  BP(mean): 78 (25 Jul 2018 07:00) (77 - 78)  ABP: 103/50 (25 Jul 2018 06:00) (103/50 - 103/50)  ABP(mean): 63 (25 Jul 2018 06:00) (63 - 63)  RR: 18 (26 Jul 2018 00:35) (16 - 18)  SpO2: 96% (26 Jul 2018 00:35) (96% - 100%)        07-24 @ 07:01  -  07-25 @ 07:00  --------------------------------------------------------  IN: 2200 mL / OUT: 1200 mL / NET: 1000 mL    07-25 @ 07:01 - 07-26 @ 05:20  --------------------------------------------------------  IN: 1270 mL / OUT: 2845 mL / NET: -1575 mL      CAPILLARY BLOOD GLUCOSE      POCT Blood Glucose.: 191 mg/dL (26 Jul 2018 01:14)      PHYSICAL EXAM: NAD in bed  General: Awake, alert, oriented X 3.   HEENT: Atraumatic, normocephalic.                 Mallampatti Grade 2                No nasal congestion.                No tonsillar or pharyngeal exudates.  Lymph Nodes: No palpable lymphadenopathy  Neck: No JVD. No carotid bruit.   Respiratory: abnormal chest expansion-reduced                         Normal percussion                         reduced but equal air entry                         No wheeze,but +rhonchi no rales.  Cardiovascular: S1 S2 normal. No murmurs, rubs or gallops.   Abdomen: Soft, non-tender, non-distended. No organomegaly. reduced BS  Extremities: Warm to touch. Peripheral pulse palpable. No pedal edema.   Skin: No rashes or skin lesions  Neurological: Motor and sensory examination equal and normal in all four extremities.  Psychiatry: Appropriate mood and affect.    HOSPITAL MEDICATIONS:  MEDICATIONS  (STANDING):  ALBUTerol/ipratropium for Nebulization 3 milliLiter(s) Nebulizer every 6 hours  aztreonam  IVPB 2000 milliGRAM(s) IV Intermittent once  clindamycin IVPB 900 milliGRAM(s) IV Intermittent once  dextrose 5% + sodium chloride 0.45%. 1000 milliLiter(s) (75 mL/Hr) IV Continuous <Continuous>  dextrose 5%. 1000 milliLiter(s) (50 mL/Hr) IV Continuous <Continuous>  dextrose 50% Injectable 12.5 Gram(s) IV Push once  dextrose 50% Injectable 25 Gram(s) IV Push once  dextrose 50% Injectable 25 Gram(s) IV Push once  digoxin  IVPB 0.25 milliGRAM(s) IV Intermittent daily  heparin  Injectable 5000 Unit(s) SubCutaneous every 8 hours  insulin lispro (HumaLOG) corrective regimen sliding scale   SubCutaneous every 6 hours  methylPREDNISolone sodium succinate Injectable 4 milliGRAM(s) IV Push daily  morphine PCA (5 mG/mL) 30 milliLiter(s) PCA Continuous PCA Continuous  pantoprazole  Injectable 40 milliGRAM(s) IV Push daily  tiotropium 18 MICROgram(s) Capsule 1 Capsule(s) Inhalation daily    MEDICATIONS  (PRN):  ALBUTerol    90 MICROgram(s) HFA Inhaler 2 Puff(s) Inhalation every 6 hours PRN Shortness of Breath  dextrose 40% Gel 15 Gram(s) Oral once PRN Blood Glucose LESS THAN 70 milliGRAM(s)/deciliter  diphenhydrAMINE   Injectable 12.5 milliGRAM(s) IV Push every 4 hours PRN Itching  glucagon  Injectable 1 milliGRAM(s) IntraMuscular once PRN Glucose LESS THAN 70 milligrams/deciliter  morphine PCA (5 mG/mL) Rescue Clinician Bolus 2.5 milliGRAM(s) IV Push every 15 minutes PRN for Pain Scale GREATER THAN 6  naloxone Injectable 0.1 milliGRAM(s) IV Push every 3 minutes PRN For ANY of the following changes in patient status:  A. RR LESS THAN 10 breaths per minute, B. Oxygen saturation LESS THAN 90%, C. Sedation score of 6      LABS:                        11.4   14.8  )-----------( 201      ( 25 Jul 2018 04:32 )             37.5     07-25    142  |  106  |  10  ----------------------------<  191<H>  4.6   |  26  |  0.77    Ca    8.2<L>      25 Jul 2018 04:32  Phos  4.2     07-25  Mg     2.1     07-25                MICROBIOLOGY:     RADIOLOGY:  [ ] Reviewed and interpreted by me    Point of Care Ultrasound Findings:    PFT:    EKG:

## 2018-07-26 NOTE — PROGRESS NOTE ADULT - SUBJECTIVE AND OBJECTIVE BOX
Day 2 of Anesthesia Pain Management Service    SUBJECTIVE: Patient is doing well with IV PCA    Pain Scale Score:	[X] Refer to charted pain scores    THERAPY:    [X ] IV PCA Morphine		[X ] 5 mg/mL	[ ] 1 mg/mL  [ ] IV PCA Hydromorphone	[ ] 5 mg/mL	[ ] 1 mg/mL  [ ] IV PCA Fentanyl		[ ] 50 micrograms/mL    Demand dose: 1 mg     Lockout: 6 minutes   Continuous Rate: 0 mg/hr  4 Hour Limit: 12 mg    MEDICATIONS  (STANDING):  ALBUTerol/ipratropium for Nebulization 3 milliLiter(s) Nebulizer every 6 hours  aztreonam  IVPB 2000 milliGRAM(s) IV Intermittent once  buDESOnide 160 MICROgram(s)/formoterol 4.5 MICROgram(s) Inhaler 2 Puff(s) Inhalation two times a day  clindamycin IVPB 900 milliGRAM(s) IV Intermittent once  dextrose 5% + sodium chloride 0.45% with potassium chloride 20 mEq/L 1000 milliLiter(s) (75 mL/Hr) IV Continuous <Continuous>  dextrose 5%. 1000 milliLiter(s) (50 mL/Hr) IV Continuous <Continuous>  dextrose 50% Injectable 12.5 Gram(s) IV Push once  dextrose 50% Injectable 25 Gram(s) IV Push once  dextrose 50% Injectable 25 Gram(s) IV Push once  digoxin  Injectable 0.25 milliGRAM(s) IV Push daily  heparin  Injectable 5000 Unit(s) SubCutaneous every 8 hours  hydrocortisone sodium succinate Injectable 20 milliGRAM(s) IV Push daily  insulin lispro (HumaLOG) corrective regimen sliding scale   SubCutaneous every 6 hours  morphine PCA (5 mG/mL) 30 milliLiter(s) PCA Continuous PCA Continuous  pantoprazole  Injectable 40 milliGRAM(s) IV Push daily  sodium phosphate IVPB 30 milliMole(s) IV Intermittent once  tiotropium 18 MICROgram(s) Capsule 1 Capsule(s) Inhalation daily    MEDICATIONS  (PRN):  ALBUTerol    90 MICROgram(s) HFA Inhaler 2 Puff(s) Inhalation every 6 hours PRN Shortness of Breath  dextrose 40% Gel 15 Gram(s) Oral once PRN Blood Glucose LESS THAN 70 milliGRAM(s)/deciliter  diphenhydrAMINE   Injectable 12.5 milliGRAM(s) IV Push every 4 hours PRN Itching  glucagon  Injectable 1 milliGRAM(s) IntraMuscular once PRN Glucose LESS THAN 70 milligrams/deciliter  morphine PCA (5 mG/mL) Rescue Clinician Bolus 2.5 milliGRAM(s) IV Push every 15 minutes PRN for Pain Scale GREATER THAN 6  naloxone Injectable 0.1 milliGRAM(s) IV Push every 3 minutes PRN For ANY of the following changes in patient status:  A. RR LESS THAN 10 breaths per minute, B. Oxygen saturation LESS THAN 90%, C. Sedation score of 6  ondansetron Injectable 4 milliGRAM(s) IV Push every 6 hours PRN Nausea and/or Vomiting      OBJECTIVE:    Sedation Score:	[ X] Alert	[ ] Drowsy 	[ ] Arousable	[ ] Asleep	[ ] Unresponsive    Side Effects:	[  ] None	[X ] Nausea: antiemetics PRN	[ ] Vomiting	[ ] Pruritus  		[ ] Other:    Vital Signs Last 24 Hrs  T(C): 37.1 (26 Jul 2018 05:32), Max: 37.4 (25 Jul 2018 17:13)  T(F): 98.7 (26 Jul 2018 05:32), Max: 99.4 (26 Jul 2018 00:35)  HR: 98 (26 Jul 2018 05:32) (69 - 98)  BP: 112/70 (26 Jul 2018 05:32) (109/64 - 148/71)  BP(mean): --  RR: 18 (26 Jul 2018 05:32) (18 - 18)  SpO2: 97% (26 Jul 2018 05:32) (96% - 100%)    ASSESSMENT/ PLAN    Therapy to  be:               [X] Continued   [ ] Discontinued   [ ] Changed to PRN Analgesics    Documentation and Verification of current medications:   [X] Done	[ ] Not done, not eligible    Comments:

## 2018-07-26 NOTE — CHART NOTE - NSCHARTNOTEFT_GEN_A_CORE
Paged by nurse that patient's perineal wound vac was not functioning with error of 'obstruction'.    After multiple attempts to reinforce with tegaderm, I changed the vacuum sponge and tape. I used a sponge approximately [id number]. I obtained good suction on the vac after that.    Elisabeth Godfrey MD  General Surgery, PGY1

## 2018-07-26 NOTE — CHART NOTE - NSCHARTNOTEFT_GEN_A_CORE
Went to patient's bedside to place NGT in the setting of nausea and distention. Patient reports feeling significantly better without any nausea. At this time, patient wishes to defer NGT. Risks, benefits, and alternative options were presented to patient.     Will reevaluate patient shortly. Nursing informed of decision and told to monitor for increased nausea and page team with any issues.    Phill Leigh, PGY-1  Red Team Surgery [phone]

## 2018-07-26 NOTE — PROGRESS NOTE ADULT - SUBJECTIVE AND OBJECTIVE BOX
SUBJECTIVE: Patient c/o some nausea and coughing this AM. No GI function yet. Denies pain.     Vital Signs Last 24 Hrs  T(C): 37.1 (26 Jul 2018 05:32), Max: 37.4 (25 Jul 2018 17:13)  T(F): 98.7 (26 Jul 2018 05:32), Max: 99.4 (26 Jul 2018 00:35)  HR: 98 (26 Jul 2018 05:32) (69 - 98)  BP: 112/70 (26 Jul 2018 05:32) (107/65 - 148/71)  BP(mean): --  RR: 18 (26 Jul 2018 05:32) (18 - 18)  SpO2: 97% (26 Jul 2018 05:32) (96% - 100%)    I&O's Detail    25 Jul 2018 07:01  -  26 Jul 2018 07:00  --------------------------------------------------------  IN:    dextrose 5% + sodium chloride 0.45%.: 1950 mL    Oral Fluid: 220 mL  Total IN: 2170 mL    OUT:    Evacuated Tube System: 40 mL    Indwelling Catheter - Urethral: 3400 mL    VAC (Vacuum Assisted Closure) System: 5 mL  Total OUT: 3445 mL    Total NET: -1275 mL          Physical Exam:  General Appearance: Appears well, NAD  Abdomen: Soft, ND, appropriate incisional tenderness, dressings clean and dry and intact, VAC in place, ostomy pink and viable   Extremities: Grossly symmetric, SCD's in place     LABS:                        12.2   11.12 )-----------( 212      ( 26 Jul 2018 08:05 )             38.1     07-26    139  |  102  |  4<L>  ----------------------------<  184<H>  3.6   |  29  |  0.67    Ca    8.2<L>      26 Jul 2018 07:18  Phos  1.7     07-26  Mg     1.9     07-26                  Gena Ochoa PA-C  p#[phone]

## 2018-07-26 NOTE — PROGRESS NOTE ADULT - ASSESSMENT
67 yr F with PMH of COPD/asthma, tracheobronchomalacia s/p tracheo-bronchoplasty in 10/16, Afib on Eliquis , Adrenal Insufficieny on steroids,  DM2, HTN, Squamous cell CA of the anus on chemo/XRT, now s/p elective abdominoperineal proctectomy for recurrent exophytic anal cancer on 7/24/18    1. Cough:  - Currently stable pulm status  - Would check Sputum Cx  - In the past her SCxs/ BAL cxs have shown Grp B strept, Acinetobacter, Achromobacter, Pasteurella and Serratia  - Her current antibiotic regimen of Aztreonam and Clindamycin will offer sufficient resp coverage for now   - Suggest nebulized hypertonic saline Q12H (preceded by Duonebs) to asssit with mobilization of secretions  - Cont close monitoring of resp symptoms. Optimize pain to prevent resp splinting  - Incentive spirometry/ acapella use/ OBC and early mobilization    2. COPD/ Asthma/ Tracheomalacia s/p tracheobronchoplasty   - Start Duonebs Q6H  - Start Symbicort BID (takes Breo ellipta at home which is non formularly)  - Start Spiriva daily  - Once tolerating PO intake, can restart home dose of Singulair daily  - Supplemental O2 to keep sats > 90 %    3. Type 2 DM:  - Cont sliding insulin and monitor FS    4. Recurrent anal cancer s/p abdominoperineal proctectomy  - Cont care per surgical team     5. Gen:   - DVT Px: Hep SQ  - GI Px: Protonix  - Dr. Allison will follow her during hospitalization 67 yr F with PMH of COPD/asthma, tracheobronchomalacia s/p tracheo-bronchoplasty in 10/16, Afib on Eliquis , Adrenal Insufficieny on steroids,  DM2, HTN, Squamous cell CA of the anus on chemo/XRT, now s/p elective abdominoperineal proctectomy for recurrent exophytic anal cancer on 7/24/18    1. Cough:  - Currently stable pulm status  - Would check Sputum Cx  - In the past her SCxs/ BAL cxs have shown Grp B strept, Acinetobacter, Achromobacter, Pasteurella and Serratia  - Her current antibiotic regimen of Aztreonam and Clindamycin will offer sufficient resp coverage for now   - Suggest nebulized hypertonic saline Q12H (preceded by Duonebs) to asssit with mobilization of secretions  - Cont close monitoring of resp symptoms. Optimize pain to prevent resp splinting  - Incentive spirometry/ acapella use/ OBC and early mobilization    2. COPD/ Asthma/ Tracheomalacia s/p tracheobronchoplasty   - Start Duonebs Q6H  - Start Symbicort BID (takes Breo ellipta at home which is non formularly)  - Start Spiriva daily  - Once tolerating PO intake, can restart home dose of Singulair daily  - Supplemental O2 to keep sats > 90 %    SEE below as well

## 2018-07-26 NOTE — PROGRESS NOTE ADULT - SUBJECTIVE AND OBJECTIVE BOX
Consult:  · Requested by Name:	Terrell Arango	  · Date/Time:	2018 15:18	  · Reason for Referral/Consultation:	Perioperative management of cardiac conditions	      · Subjective and Objective: 	  **********              CARDIOLOGY CONSULT NOTE              ************  ============================================================  CHIEF COMPLAINT/REASON FOR CONSULT:  Patient is a 67y old  Female who presents with a chief complaint of rectal bleeding (2018 10:00)      HISTORY OF PRESENT ILLNESS:  67yFemale with a history of Remote Asthma, DVT, TIA, COPD, Moderate AI, Normal LVEF, pAF on Digoxin/Eliquis prior to admission, Colorectal CA p/w bleeding s/p SCC resection POD#1  - presenting for SCC resection.  No CP/SOB/Palps.         ============================================================  24 hr events  - Given IV Dig  - Still NPO  - Doing better overall  ============================================================      Allergies    ampicillin (Short breath)  aspirin (Short breath)  Avelox (Short breath; Pruritus)  codeine (Short breath)  Dilaudid (Short breath)  iodine (Short breath; Swelling)  penicillin (Short breath)  shellfish (Anaphylaxis)  tetanus toxoid (Short breath)  Valium (Short breath)    Intolerances    	    MEDICATIONS:  heparin  Injectable 5000 Unit(s) SubCutaneous every 8 hours    aztreonam  IVPB 2000 milliGRAM(s) IV Intermittent once  clindamycin IVPB 900 milliGRAM(s) IV Intermittent once    ALBUTerol    90 MICROgram(s) HFA Inhaler 2 Puff(s) Inhalation every 6 hours PRN  ALBUTerol/ipratropium for Nebulization 3 milliLiter(s) Nebulizer every 6 hours  diphenhydrAMINE   Injectable 12.5 milliGRAM(s) IV Push every 4 hours PRN  tiotropium 18 MICROgram(s) Capsule 1 Capsule(s) Inhalation daily    morphine PCA (5 mG/mL) 30 milliLiter(s) PCA Continuous PCA Continuous  morphine PCA (5 mG/mL) Rescue Clinician Bolus 2.5 milliGRAM(s) IV Push every 15 minutes PRN    pantoprazole  Injectable 40 milliGRAM(s) IV Push daily    dextrose 40% Gel 15 Gram(s) Oral once PRN  dextrose 50% Injectable 12.5 Gram(s) IV Push once  dextrose 50% Injectable 25 Gram(s) IV Push once  dextrose 50% Injectable 25 Gram(s) IV Push once  glucagon  Injectable 1 milliGRAM(s) IntraMuscular once PRN  insulin lispro (HumaLOG) corrective regimen sliding scale   SubCutaneous every 6 hours    dextrose 5% + sodium chloride 0.45%. 1000 milliLiter(s) IV Continuous <Continuous>  dextrose 5%. 1000 milliLiter(s) IV Continuous <Continuous>      PAST MEDICAL & SURGICAL HISTORY:  TIA (transient ischemic attack): multiple, last 5 years ago - presents as right-sided weakness  DVT (deep venous thrombosis): 15-20 years ago, took coumadin  Seizure: x 1 18  Rectal bleeding  Colorectal cancer: 2018- last treatment , chemo and radiation  Tracheobronchomalacia: diagnosed , s/p bronchial thermoplasty  (Dr Zapien); recent bronchoscopy 2018 revealed no evidence of tracheobronchomalacia in trachea or bronchial tubes  Asthma  Pelvic fracture  Aortic insufficiency: moderate AR on echo 5/3/2018  Adrenal insufficiency: Medrol daily for over 50 years  COPD (chronic obstructive pulmonary disease)  Diabetes: Type 2  Atrial fibrillation: paroxysmal, on eliquis  Rectal bleeding: exam under anesthesia ([facility]) 2018  S/P bronchoscopy: 2018 - [facility] (Dr Zapien) no evidence of tracheobronchomalacia in trachea or bronchial tubes  History of tracheomalacia:  - attempted tracheal stenting ([facility])- course complicated by obstruction, respiratory failure, multiple CPR attempts -  stent discontinued; 10/20/2016 Tracheobronchoplasty (Prolene Mesh) performed at [facility] by Dr Zapien  H/O pelvic surgery: 5 years ago - s/p fracture  Exostosis of orbit, 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  History of sinus surgery: multiple sinus surgeries  H/O total knee replacement, bilateral: 5 years ago  History of partial hysterectomy: 30 years ago - fibroids      FAMILY HISTORY:  Family history of diabetes mellitus type II  Family history of breast cancer (Sibling)  Family history of asthma    NC -   Mother - HTN  Father - DM    SOCIAL HISTORY:    [ x] Non-smoker  [x] No Illicit Drug Use  [ x] No Excess EtOH Use      REVIEW OF SYSTEMS: (Unless + Before Symptom, it is negative)  Constitutional: No Fever, Fatigue, Weight Changes  Eyes: No Recent Vision Changes, Eye Pain  ENT: No Congestion, Sore Throat  Endocrine: No Excess Sweating, Temperature Intolerance  Cardiovascular: No Chest Pain, Palpitations, Shortness of Breath, Pre-syncope, Syncope, LE Edema  Respiratory: No Cough, Congestion, Wheezing  Gastrointestinal: +Abdominal Pain, Nausea, Vomiting  Genitourinary: No dysuria, hematuria  Musculoskeletal: No Joint Pain, Swelling  Neurologic: No headaches, Imbalance, Weakness  Skin: No rashes, hematoma, purprura    ================================    PHYSICAL EXAM:  T(C): 36.7 (18 @ 14:29), Max: 37.1 (18 @ 07:35)  HR: 77 (18 @ 14:29) (61 - 86)  BP: 120/65 (18 @ 14:29) (91/54 - 132/59)  RR: 18 (18 @ 14:29) (15 - 18)  SpO2: 99% (18 @ 14:29) (95% - 100%)  Wt(kg): --  I&O's Summary    2018 07:  -  2018 07:00  --------------------------------------------------------  IN: 2200 mL / OUT: 1200 mL / NET: 1000 mL    2018 07:  -  2018 15:18  --------------------------------------------------------  IN: 0 mL / OUT: 1050 mL / NET: -1050 mL      Appearance: Normal; NAD	  HEENT:   Normal oral mucosa, EOMI	  Lymphatic: No lymphadenopathy  Cardiovascular: Normal S1 S2, No JVD, No murmurs, No edema  Respiratory: Lungs clear to auscultation, no use of accessory muscles	  Psychiatry: A & O x 3, Mood & affect appropriate  Gastrointestinal:  Soft, +Distended +Less Tender +Ostomy  Skin: No rashes, No ecchymoses, No cyanosis	  Neurologic: Non-focal, No Focal Deficits  Extremities: Normal range of motion, No clubbing, cyanosis or edema  Vascular: Peripheral pulses palpable 2+ bilaterally, no prominent varicosities    ============================    LABS:	   Labs     CBC Full  -  ( 2018 04:32 )  WBC Count : 14.8 K/uL  Hemoglobin : 11.4 g/dL  Hematocrit : 37.5 %  Platelet Count - Automated : 201 K/uL  Mean Cell Volume : 76.1 fl  Mean Cell Hemoglobin : 23.1 pg  Mean Cell Hemoglobin Concentration : 30.3 gm/dL  Auto Neutrophil # : x  Auto Lymphocyte # : x  Auto Monocyte # : x  Auto Eosinophil # : x  Auto Basophil # : x  Auto Neutrophil % : x  Auto Lymphocyte % : x  Auto Monocyte % : x  Auto Eosinophil % : x  Auto Basophil % : x        142  |  106  |  10  ----------------------------<  191<H>  4.6   |  26  |  0.77  07-25    140  |  104  |  9   ----------------------------<  201<H>  4.4   |  25  |  0.73    Ca    8.2<L>      2018 04:32  Ca    8.2<L>      2018 00:16  Phos  4.2     07-25  Phos  4.1     07-25  Mg     2.1     07-25  Mg     2.0     07-25          =========================================================================  CXR:  < from: Xray Chest 1 View- PORTABLE-Urgent (18 @ 16:02) >    EXAM:  XR CHEST PORTABLE URGENT 1V                          PROCEDURE DATE:  2018          INTERPRETATION:  Portable Chest X-Ray dated 2018 4:02 PM    Indication: s/p bronch    Prior studies: 2018    An AP portable view of the chest revealed cardiomegaly. Trachea midline.   No pneumothorax seen. Trace right pleural effusion. Increased markings in   the left lower lobe. Right Port-A-Cath with the tip in the right atrium.    IMPRESSION:  No pneumothorax seen. Trace right pleural effusion.             "Thank you for the opportunity to participate in the care of this   patient."    < end of copied text >      ECG:  	    PREVIOUS DIAGNOSTIC TESTING:    [X] Echocardiogram:  < from: Echocardiogram (18 @ 10:55) >      INTERPRETATION:  Patient Height: 170.2 cm  Patient Weight: 73.0 kg  Heart Rate: 70 bpm  Systolic Pressure: 132 mmHg  Diastolic Pressure: 70 mmHg  BSA: 1.8 m^2  Interpretation Summary  The left atrial size is normal. Right atrial size is normal.There is mild   aortic valve thickening. The aortic valve is trileaflet. There is   moderate   aortic regurgitation. No hemodynamically significantvalvular aortic   stenosis.    There is mild mitral valve thickening. There is mild mitral annular   calcification. There is trace mitral regurgitation.  Structurally normal   tricuspid valve. There is trace tricuspid regurgitation.There was   insufficient   TR detected from which to calculate pulmonary artery systolic pressure.    The   pulmonic valve is not well visualized. No pulmonic regurgitation   noted.The   right ventricle is normal in size and function.There is moderate   concentric   left ventricular hypertrophy. The left ventricular wall motion is   normal. The   left ventricular ejection fraction is 63%.  No aortic root   dilatation.There is   no pericardial effusion.When compared to prior study performed on   10/21/16,   there isno significant change.  Procedure Details  A complete two-dimensional transthoracic echocardiogram was performed (2D,  M-mode, spectral and color flow doppler).  Study Quality: Fair.  Left Ventricle  There is moderate concentric left ventricular hypertrophy.  The left ventricular wall motion is normal.  The left ventricular ejection fraction is 63%.  Left Atrium  The left atrial size is normal.  Right Atrium  Right atrial size is normal.  Right Ventricle  The right ventricle is normal in size andfunction.  Aortic Valve  There is mild aortic valve thickening.  The aortic valve is trileaflet.  There is moderate aortic regurgitation.  No hemodynamically significant valvular aortic stenosis.  Mitral Valve  There is mild mitral valve thickening.  There is mild mitral annular calcification.  There is trace mitral regurgitation.  Tricuspid Valve  Structurally normal tricuspid valve.  There was insufficient TR detected from which to calculate pulmonary   artery  systolic pressure.  There is trace tricuspid regurgitation.  Pulmonic Valve  The pulmonic valve is not well visualized.  No pulmonic regurgitation noted.  Arteries and Venous System  No aortic root dilatation.  The inferior vena cava is normal in size (<2.1 cm) with normal inspiratory  collapse (>50%) consistent with normal right atrial pressure.  Pericardium / Pleura  There is no pericardial effusion.  Doppler Measurements & Calculations  MV E point: 67.9 cm/sec  MV A point: 91.3 cm/sec  MV E/A: 0.7  MV dec time: 0.2 sec  Ao V2 max: 172.4 cm/sec  Ao max P.9 mmHg  Ao max PG (full): 5.5 mmHg  SUSANA(V,A): 2.4 cm^2  SUSANA(V,D): 2.4 cm^2  AI max lynette: 399.1 cm/sec  AI max P.7 mmHg  AI dec slope: 275.9 cm/sec^2  AI P1/2t: 423.7 msec  LV max P.4 mmHg  LV V1 max: 126.5 cm/sec  MMode 2D Measurements & Calculations  IVSd: 1.3 cm  LVIDd: 4.2 cm  LVIDs: 2.9 cm  LVPWd: 1.1 cm  IVS/LVPW: 1.2  FS: 31.3 %  EDV(Teich): 80.1 ml  ESV(Teich): 32.5 ml  LV mass(C)d: 180.5 grams  LV mass(C)dI: 97.9 grams/m^2  SI(cubed): 27.8 ml/m^2  Ao root diam: 3.3 cm  Ao root area: 8.8 cm^2  LA dimension: 4.6 cm  LA/Ao: 1.4  LVOT diam: 2.0 cm  LVOT area: 3.2 cm^2  LVOT area (M): 3.8 cm^2  LVLd ap4: 7.6 cm  EDV(MOD-sp4): 70 ml  LVLs ap4: 6.6 cm  ESV(MOD-sp4): 22 ml  EF(MOD-sp4): 68.6 %  LVLd ap2: 7.4 cm  EDV(MOD-sp2): 43 ml  LVLs ap2: 5.9 cm  ESV(MOD-sp2): 16 ml  EF(MOD-sp2): 62.8 %  SV(MOD-sp4): 48 ml  SI(MOD-sp4): 26.0 ml/m^2  SV(MOD-sp2): 27 ml  SI(MOD-sp2): 14.6 ml/m^2  Interpreting Physician:OMAR Dias electronically signed on   2018 12:45:03        =========================================================================  ASSESSMENT:    67yFemale with a history of Remote Asthma, DVT, TIA, COPD, Moderate AI, Normal LVEF, pAF on Digoxin/Eliquis prior to admission, Colorectal CA p/w bleeding s/p SCC resection POD#1  - presenting for SCC resection.    Recs  - Resume Eliquis when able  - Resume Digoxin - check level in 48 hrs   - Hold Diovan  - Careful with fluid load given AI  - Thank you for this consult.  - Will Follow      Please call with questions.     Baron Tristan MD, [facility]  [phone]

## 2018-07-26 NOTE — PROGRESS NOTE ADULT - ASSESSMENT
68yo F POD 2 s/p APR     -PT for VAC consult  -PT eval  -NPO/IVF  -Await GI function   -Monitor HR in setting of chronic A fib  -IV digoxin for rate control  -Zofran for nausea  -keep ramirez for now and re-eval this afternoon for removal   -Sump drain removed this AM  -OOB/IS  -DVT ppx  -Pain control   -IV steroids for adrenal insufficiency

## 2018-07-26 NOTE — PROGRESS NOTE ADULT - ATTENDING COMMENTS
as above-  Pulm stable--TBM, asthma, chronic bronchitis-atelectasis due to pain  symbicort/spiriva/supplemental O2, medrol 4mg-out pt xolair  acapella -incentive spirometry  pain control  ambulate--as per CRS    Eulalio Allison MD-Pulmonary   [phone]

## 2018-07-27 LAB
ANION GAP SERPL CALC-SCNC: 13 MMOL/L — SIGNIFICANT CHANGE UP (ref 5–17)
BUN SERPL-MCNC: 7 MG/DL — SIGNIFICANT CHANGE UP (ref 7–23)
CALCIUM SERPL-MCNC: 8.3 MG/DL — LOW (ref 8.4–10.5)
CHLORIDE SERPL-SCNC: 98 MMOL/L — SIGNIFICANT CHANGE UP (ref 96–108)
CO2 SERPL-SCNC: 25 MMOL/L — SIGNIFICANT CHANGE UP (ref 22–31)
CREAT SERPL-MCNC: 0.62 MG/DL — SIGNIFICANT CHANGE UP (ref 0.5–1.3)
GLUCOSE BLDC GLUCOMTR-MCNC: 196 MG/DL — HIGH (ref 70–99)
GLUCOSE BLDC GLUCOMTR-MCNC: 221 MG/DL — HIGH (ref 70–99)
GLUCOSE BLDC GLUCOMTR-MCNC: 249 MG/DL — HIGH (ref 70–99)
GLUCOSE BLDC GLUCOMTR-MCNC: 250 MG/DL — HIGH (ref 70–99)
GLUCOSE BLDC GLUCOMTR-MCNC: 258 MG/DL — HIGH (ref 70–99)
GLUCOSE BLDC GLUCOMTR-MCNC: 276 MG/DL — HIGH (ref 70–99)
GLUCOSE SERPL-MCNC: 274 MG/DL — HIGH (ref 70–99)
HCT VFR BLD CALC: 44 % — SIGNIFICANT CHANGE UP (ref 34.5–45)
HGB BLD-MCNC: 13.6 G/DL — SIGNIFICANT CHANGE UP (ref 11.5–15.5)
MAGNESIUM SERPL-MCNC: 1.7 MG/DL — SIGNIFICANT CHANGE UP (ref 1.6–2.6)
MCHC RBC-ENTMCNC: 23.7 PG — LOW (ref 27–34)
MCHC RBC-ENTMCNC: 30.9 GM/DL — LOW (ref 32–36)
MCV RBC AUTO: 76.7 FL — LOW (ref 80–100)
PHOSPHATE SERPL-MCNC: 2.1 MG/DL — LOW (ref 2.5–4.5)
PLATELET # BLD AUTO: 228 K/UL — SIGNIFICANT CHANGE UP (ref 150–400)
POTASSIUM SERPL-MCNC: 4.1 MMOL/L — SIGNIFICANT CHANGE UP (ref 3.5–5.3)
POTASSIUM SERPL-SCNC: 4.1 MMOL/L — SIGNIFICANT CHANGE UP (ref 3.5–5.3)
RBC # BLD: 5.74 M/UL — HIGH (ref 3.8–5.2)
RBC # FLD: 14.6 % — HIGH (ref 10.3–14.5)
SODIUM SERPL-SCNC: 136 MMOL/L — SIGNIFICANT CHANGE UP (ref 135–145)
WBC # BLD: 11.1 K/UL — HIGH (ref 3.8–10.5)
WBC # FLD AUTO: 11.1 K/UL — HIGH (ref 3.8–10.5)

## 2018-07-27 PROCEDURE — 93010 ELECTROCARDIOGRAM REPORT: CPT

## 2018-07-27 PROCEDURE — 99233 SBSQ HOSP IP/OBS HIGH 50: CPT

## 2018-07-27 PROCEDURE — 99232 SBSQ HOSP IP/OBS MODERATE 35: CPT

## 2018-07-27 PROCEDURE — 71045 X-RAY EXAM CHEST 1 VIEW: CPT | Mod: 26

## 2018-07-27 RX ORDER — INSULIN LISPRO 100/ML
VIAL (ML) SUBCUTANEOUS EVERY 6 HOURS
Qty: 0 | Refills: 0 | Status: DISCONTINUED | OUTPATIENT
Start: 2018-07-27 | End: 2018-08-01

## 2018-07-27 RX ORDER — MAGNESIUM SULFATE 500 MG/ML
2 VIAL (ML) INJECTION ONCE
Qty: 0 | Refills: 0 | Status: COMPLETED | OUTPATIENT
Start: 2018-07-27 | End: 2018-07-27

## 2018-07-27 RX ORDER — ENOXAPARIN SODIUM 100 MG/ML
40 INJECTION SUBCUTANEOUS DAILY
Qty: 0 | Refills: 0 | Status: DISCONTINUED | OUTPATIENT
Start: 2018-07-27 | End: 2018-08-08

## 2018-07-27 RX ADMIN — Medication 4: at 17:41

## 2018-07-27 RX ADMIN — Medication 50 GRAM(S): at 12:16

## 2018-07-27 RX ADMIN — MORPHINE SULFATE 30 MILLILITER(S): 50 CAPSULE, EXTENDED RELEASE ORAL at 19:24

## 2018-07-27 RX ADMIN — Medication 2: at 06:52

## 2018-07-27 RX ADMIN — Medication 3 MILLILITER(S): at 23:36

## 2018-07-27 RX ADMIN — ENOXAPARIN SODIUM 40 MILLIGRAM(S): 100 INJECTION SUBCUTANEOUS at 15:44

## 2018-07-27 RX ADMIN — Medication 0.25 MILLIGRAM(S): at 14:05

## 2018-07-27 RX ADMIN — DEXTROSE MONOHYDRATE, SODIUM CHLORIDE, AND POTASSIUM CHLORIDE 75 MILLILITER(S): 50; .745; 4.5 INJECTION, SOLUTION INTRAVENOUS at 14:06

## 2018-07-27 RX ADMIN — Medication 62.5 MILLIMOLE(S): at 14:07

## 2018-07-27 RX ADMIN — BUDESONIDE AND FORMOTEROL FUMARATE DIHYDRATE 2 PUFF(S): 160; 4.5 AEROSOL RESPIRATORY (INHALATION) at 17:43

## 2018-07-27 RX ADMIN — Medication 3 MILLILITER(S): at 06:43

## 2018-07-27 RX ADMIN — Medication 1: at 00:13

## 2018-07-27 RX ADMIN — Medication 3 MILLILITER(S): at 00:13

## 2018-07-27 RX ADMIN — Medication 3: at 15:43

## 2018-07-27 RX ADMIN — Medication 3 MILLILITER(S): at 17:43

## 2018-07-27 RX ADMIN — MORPHINE SULFATE 30 MILLILITER(S): 50 CAPSULE, EXTENDED RELEASE ORAL at 07:06

## 2018-07-27 RX ADMIN — Medication 3 MILLILITER(S): at 12:16

## 2018-07-27 RX ADMIN — Medication 2: at 23:36

## 2018-07-27 RX ADMIN — PANTOPRAZOLE SODIUM 40 MILLIGRAM(S): 20 TABLET, DELAYED RELEASE ORAL at 12:17

## 2018-07-27 RX ADMIN — BUDESONIDE AND FORMOTEROL FUMARATE DIHYDRATE 2 PUFF(S): 160; 4.5 AEROSOL RESPIRATORY (INHALATION) at 06:43

## 2018-07-27 RX ADMIN — HEPARIN SODIUM 5000 UNIT(S): 5000 INJECTION INTRAVENOUS; SUBCUTANEOUS at 06:27

## 2018-07-27 RX ADMIN — Medication 20 MILLIGRAM(S): at 06:43

## 2018-07-27 RX ADMIN — MORPHINE SULFATE 30 MILLILITER(S): 50 CAPSULE, EXTENDED RELEASE ORAL at 03:52

## 2018-07-27 RX ADMIN — TIOTROPIUM BROMIDE 1 CAPSULE(S): 18 CAPSULE ORAL; RESPIRATORY (INHALATION) at 14:06

## 2018-07-27 NOTE — DIETITIAN INITIAL EVALUATION ADULT. - OTHER INFO
Pt seen for extended NPO status. Per chart, pt is POD 2 s/p APR. NPO with NGT. NGT output 300ml x 24hours. Pt denies any history of chewing/swallowing difficulty or GI distress at this time.

## 2018-07-27 NOTE — PROGRESS NOTE ADULT - PROBLEM SELECTOR PLAN 5
s/p[ surgery--optimal pain control  f/up final pathology  on IV abx as per CRS s/p[ surgery--optimal pain control  f/up final pathology  off IV abx as per CRS-as of 7/27

## 2018-07-27 NOTE — PROGRESS NOTE ADULT - ASSESSMENT
67 yr F with PMH of COPD/asthma, tracheobronchomalacia s/p tracheo-bronchoplasty in 10/16, Afib on Eliquis , Adrenal Insufficieny on steroids,  DM2, HTN, Squamous cell CA of the anus on chemo/XRT, now s/p elective abdominoperineal proctectomy for recurrent exophytic anal cancer on 7/24/18    1. Cough:  - Currently stable pulm status  - Would check Sputum Cx  - In the past her SCxs/ BAL cxs have shown Grp B strept, Acinetobacter, Achromobacter, Pasteurella and Serratia  - Her current antibiotic regimen of Aztreonam and Clindamycin will offer sufficient resp coverage for now   - Suggest nebulized hypertonic saline Q12H (preceded by Duonebs) to asssit with mobilization of secretions  - Cont close monitoring of resp symptoms. Optimize pain to prevent resp splinting  - Incentive spirometry/ acapella use/ OBC and early mobilization    2. COPD/ Asthma/ Tracheomalacia s/p tracheobronchoplasty   - Start Duonebs Q6H  - Start Symbicort BID (takes Breo ellipta at home which is non formularly)  - Start Spiriva daily  - Once tolerating PO intake, can restart home dose of Singulair daily  - Supplemental O2 to keep sats > 90 %    SEE below as well 67 yr F with PMH of COPD/asthma, tracheobronchomalacia s/p tracheo-bronchoplasty in 10/16, Afib on Eliquis , Adrenal Insufficieny on steroids,  DM2, HTN, Squamous cell CA of the anus on chemo/XRT, now s/p elective abdominoperineal proctectomy for recurrent exophytic anal cancer on 7/24/18    1. Cough:  - Currently stable pulm status  -  Sputum Cx--pending  - In the past her SCxs/ BAL cxs have shown Grp B strept, Acinetobacter, Achromobacter, Pasteurella and Serratia  - Antibiotic regimen of Aztreonam and Clindamycin will offer sufficient resp coverage for now   - On nebulized hypertonic saline Q12H (preceded by Duonebs) to asssit with mobilization of secretions  - Cont close monitoring of resp symptoms. Optimize pain to prevent resp splinting  - Incentive spirometry/ acapella use/ OBC and early mobilization    2. COPD/ Asthma/ Tracheomalacia s/p tracheobronchoplasty   - ont Duonebs Q6H  - continue Symbicort BID (takes Breo ellipta at home which is non formularly)  - continue Spiriva daily  - Once tolerating PO intake, can restart home dose of Singulair daily  - Supplemental O2 to keep sats > 90 %    SEE below as well 67 yr F with PMH of COPD/asthma, tracheobronchomalacia s/p tracheo-bronchoplasty in 10/16, Afib on Eliquis , Adrenal Insufficieny on steroids,  DM2, HTN, Squamous cell CA of the anus on chemo/XRT, now s/p elective abdominoperineal proctectomy for recurrent exophytic anal cancer on 7/24/18    1. Cough:  - Currently stable pulm status  -  Sputum Cx--pending  - In the past her SCxs/ BAL cxs have shown Grp B strept, Acinetobacter, Achromobacter, Pasteurella and Serratia  -  Off Antibiotic regimen of Aztreonam and Clindamycin as of 7/27   - On nebulized hypertonic saline Q12H (preceded by Duonebs) to assist with mobilization of secretions  - Cont close monitoring of resp symptoms. Optimize pain to prevent resp splinting  - Incentive spirometry/ acapella use/ OBC and early mobilization    2. COPD/ Asthma/ Tracheomalacia s/p tracheobronchoplasty   - ont Duonebs Q6H  - continue Symbicort BID (takes Breo ellipta at home which is non formularly)  - continue Spiriva daily  - Once tolerating PO intake, can restart home dose of Singulair daily  - Supplemental O2 to keep sats > 90 %    SEE below as well

## 2018-07-27 NOTE — DIETITIAN INITIAL EVALUATION ADULT. - NS AS NUTRI INTERV MEALS SNACK
Medical team to advance diet when medically feasible via tolerated route. Consider advancing to clear liquid, followed by low fiber diet as tolerated. If NPO status > 7 days, consider alternate means of nutrition if within pt/family GOC.

## 2018-07-27 NOTE — PHYSICAL THERAPY INITIAL EVALUATION ADULT - IMPAIRMENTS FOUND, PT EVAL
integumentary integrity/gait, locomotion, and balance/muscle strength/posture/aerobic capacity/endurance/poor safety awareness

## 2018-07-27 NOTE — PROGRESS NOTE ADULT - SUBJECTIVE AND OBJECTIVE BOX
Pain Management Attending Addendum    SUBJECTIVE:    Therapy:	  [X ] IV PCA	   [ ] Epidural           [ ] s/p Spinal Opoid              [ ] Postpartum infusion	  [ ] Patient controlled regional anesthesia (PCRA)    [ ] prn Analgesics    OBJECTIVE:   [X ] No new signs     [ ] Other:    Side Effects:  [X ] None			[ ] Other:    Assessment of Catheter Site:		[ ] Intact		[ ] Other:n/a    ASSESSMENT/PLAN  [X ] Continue current therapy    [ ] Therapy changed to:    [ ] IV PCA       [ ] Epidural     [ ] prn Analgesics     [ ] post partum infusion    Comments: home w/ home PT

## 2018-07-27 NOTE — DIETITIAN INITIAL EVALUATION ADULT. - ORAL INTAKE PTA
Pt reports good appetite and PO intake. Confirmed shellfish allergy. Pt denies micronutrient supplementation PTA./good

## 2018-07-27 NOTE — DIETITIAN INITIAL EVALUATION ADULT. - ENERGY NEEDS
Ht:67  Wt: 155 pounds BMI: 24.3 kg/m2 IBW:  135 pounds(+/-10%)  No edema. No pressure ulcers documented.

## 2018-07-27 NOTE — PROGRESS NOTE ADULT - SUBJECTIVE AND OBJECTIVE BOX
Consult:  · Requested by Name:	Terrell Arango	  · Date/Time:	2018 15:18	  · Reason for Referral/Consultation:	Perioperative management of cardiac conditions	      · Subjective and Objective: 	  **********              CARDIOLOGY CONSULT NOTE              ************  ============================================================  CHIEF COMPLAINT/REASON FOR CONSULT:  Patient is a 67y old  Female who presents with a chief complaint of rectal bleeding (2018 10:00)      HISTORY OF PRESENT ILLNESS:  67yFemale with a history of Remote Asthma, DVT, TIA, COPD, Moderate AI, Normal LVEF, pAF on Digoxin/Eliquis prior to admission, Colorectal CA p/w bleeding s/p SCC resection POD#1  - presenting for SCC resection.  No CP/SOB/Palps.         ============================================================  24 hr events  - Given IV Dig  - Still NPO  - Trial failed and repeat NG Tube  ============================================================      Allergies    ampicillin (Short breath)  aspirin (Short breath)  Avelox (Short breath; Pruritus)  codeine (Short breath)  Dilaudid (Short breath)  iodine (Short breath; Swelling)  penicillin (Short breath)  shellfish (Anaphylaxis)  tetanus toxoid (Short breath)  Valium (Short breath)    Intolerances    	    MEDICATIONS:  heparin  Injectable 5000 Unit(s) SubCutaneous every 8 hours    aztreonam  IVPB 2000 milliGRAM(s) IV Intermittent once  clindamycin IVPB 900 milliGRAM(s) IV Intermittent once    ALBUTerol    90 MICROgram(s) HFA Inhaler 2 Puff(s) Inhalation every 6 hours PRN  ALBUTerol/ipratropium for Nebulization 3 milliLiter(s) Nebulizer every 6 hours  diphenhydrAMINE   Injectable 12.5 milliGRAM(s) IV Push every 4 hours PRN  tiotropium 18 MICROgram(s) Capsule 1 Capsule(s) Inhalation daily    morphine PCA (5 mG/mL) 30 milliLiter(s) PCA Continuous PCA Continuous  morphine PCA (5 mG/mL) Rescue Clinician Bolus 2.5 milliGRAM(s) IV Push every 15 minutes PRN    pantoprazole  Injectable 40 milliGRAM(s) IV Push daily    dextrose 40% Gel 15 Gram(s) Oral once PRN  dextrose 50% Injectable 12.5 Gram(s) IV Push once  dextrose 50% Injectable 25 Gram(s) IV Push once  dextrose 50% Injectable 25 Gram(s) IV Push once  glucagon  Injectable 1 milliGRAM(s) IntraMuscular once PRN  insulin lispro (HumaLOG) corrective regimen sliding scale   SubCutaneous every 6 hours    dextrose 5% + sodium chloride 0.45%. 1000 milliLiter(s) IV Continuous <Continuous>  dextrose 5%. 1000 milliLiter(s) IV Continuous <Continuous>      PAST MEDICAL & SURGICAL HISTORY:  TIA (transient ischemic attack): multiple, last 5 years ago - presents as right-sided weakness  DVT (deep venous thrombosis): 15-20 years ago, took coumadin  Seizure: x 1 18  Rectal bleeding  Colorectal cancer: 2018- last treatment , chemo and radiation  Tracheobronchomalacia: diagnosed , s/p bronchial thermoplasty  (Dr Zapien); recent bronchoscopy 2018 revealed no evidence of tracheobronchomalacia in trachea or bronchial tubes  Asthma  Pelvic fracture  Aortic insufficiency: moderate AR on echo 5/3/2018  Adrenal insufficiency: Medrol daily for over 50 years  COPD (chronic obstructive pulmonary disease)  Diabetes: Type 2  Atrial fibrillation: paroxysmal, on eliquis  Rectal bleeding: exam under anesthesia ([facility]) 2018  S/P bronchoscopy: 2018 - [facility] (Dr Zapien) no evidence of tracheobronchomalacia in trachea or bronchial tubes  History of tracheomalacia:  - attempted tracheal stenting ([facility])- course complicated by obstruction, respiratory failure, multiple CPR attempts -  stent discontinued; 10/20/2016 Tracheobronchoplasty (Prolene Mesh) performed at [facility] by Dr Zapien  H/O pelvic surgery: 5 years ago - s/p fracture  Exostosis of orbit, 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  History of sinus surgery: multiple sinus surgeries  H/O total knee replacement, bilateral: 5 years ago  History of partial hysterectomy: 30 years ago - fibroids      FAMILY HISTORY:  Family history of diabetes mellitus type II  Family history of breast cancer (Sibling)  Family history of asthma    NC -   Mother - HTN  Father - DM    SOCIAL HISTORY:    [ x] Non-smoker  [x] No Illicit Drug Use  [ x] No Excess EtOH Use      REVIEW OF SYSTEMS: (Unless + Before Symptom, it is negative)  Constitutional: No Fever, Fatigue, Weight Changes  Eyes: No Recent Vision Changes, Eye Pain  ENT: No Congestion, Sore Throat  Endocrine: No Excess Sweating, Temperature Intolerance  Cardiovascular: No Chest Pain, Palpitations, Shortness of Breath, Pre-syncope, Syncope, LE Edema  Respiratory: No Cough, Congestion, Wheezing  Gastrointestinal: +Abdominal Pain, Nausea, Vomiting  Genitourinary: No dysuria, hematuria  Musculoskeletal: No Joint Pain, Swelling  Neurologic: No headaches, Imbalance, Weakness  Skin: No rashes, hematoma, purprura    ================================    PHYSICAL EXAM:  T(C): 36.7 (18 @ 14:29), Max: 37.1 (18 @ 07:35)  HR: 77 (18 @ 14:29) (61 - 86)  BP: 120/65 (18 @ 14:29) (91/54 - 132/59)  RR: 18 (18 @ 14:29) (15 - 18)  SpO2: 99% (18 @ 14:29) (95% - 100%)  Wt(kg): --  I&O's Summary    2018 07:  -  2018 07:00  --------------------------------------------------------  IN: 2200 mL / OUT: 1200 mL / NET: 1000 mL    2018 07:  -  2018 15:18  --------------------------------------------------------  IN: 0 mL / OUT: 1050 mL / NET: -1050 mL      Appearance: Normal; NAD	  HEENT:   Normal oral mucosa, EOMI	  Lymphatic: No lymphadenopathy  Cardiovascular: Normal S1 S2, No JVD, No murmurs, No edema  Respiratory: Lungs clear to auscultation, no use of accessory muscles	  Psychiatry: A & O x 3, Mood & affect appropriate  Gastrointestinal:  Soft, +Distended +Less Tender +Ostomy  Skin: No rashes, No ecchymoses, No cyanosis	  Neurologic: Non-focal, No Focal Deficits  Extremities: Normal range of motion, No clubbing, cyanosis or edema  Vascular: Peripheral pulses palpable 2+ bilaterally, no prominent varicosities    ============================    LABS:	   Labs     CBC Full  -  ( 2018 04:32 )  WBC Count : 14.8 K/uL  Hemoglobin : 11.4 g/dL  Hematocrit : 37.5 %  Platelet Count - Automated : 201 K/uL  Mean Cell Volume : 76.1 fl  Mean Cell Hemoglobin : 23.1 pg  Mean Cell Hemoglobin Concentration : 30.3 gm/dL  Auto Neutrophil # : x  Auto Lymphocyte # : x  Auto Monocyte # : x  Auto Eosinophil # : x  Auto Basophil # : x  Auto Neutrophil % : x  Auto Lymphocyte % : x  Auto Monocyte % : x  Auto Eosinophil % : x  Auto Basophil % : x        142  |  106  |  10  ----------------------------<  191<H>  4.6   |  26  |  0.77  07-25    140  |  104  |  9   ----------------------------<  201<H>  4.4   |  25  |  0.73    Ca    8.2<L>      2018 04:32  Ca    8.2<L>      2018 00:16  Phos  4.2     07-25  Phos  4.1     07-25  Mg     2.1     07-25  Mg     2.0     07-25          =========================================================================  CXR:  < from: Xray Chest 1 View- PORTABLE-Urgent (18 @ 16:02) >    EXAM:  XR CHEST PORTABLE URGENT 1V                          PROCEDURE DATE:  2018          INTERPRETATION:  Portable Chest X-Ray dated 2018 4:02 PM    Indication: s/p bronch    Prior studies: 2018    An AP portable view of the chest revealed cardiomegaly. Trachea midline.   No pneumothorax seen. Trace right pleural effusion. Increased markings in   the left lower lobe. Right Port-A-Cath with the tip in the right atrium.    IMPRESSION:  No pneumothorax seen. Trace right pleural effusion.             "Thank you for the opportunity to participate in the care of this   patient."    < end of copied text >      ECG:  	    PREVIOUS DIAGNOSTIC TESTING:    [X] Echocardiogram:  < from: Echocardiogram (18 @ 10:55) >      INTERPRETATION:  Patient Height: 170.2 cm  Patient Weight: 73.0 kg  Heart Rate: 70 bpm  Systolic Pressure: 132 mmHg  Diastolic Pressure: 70 mmHg  BSA: 1.8 m^2  Interpretation Summary  The left atrial size is normal. Right atrial size is normal.There is mild   aortic valve thickening. The aortic valve is trileaflet. There is   moderate   aortic regurgitation. No hemodynamically significantvalvular aortic   stenosis.    There is mild mitral valve thickening. There is mild mitral annular   calcification. There is trace mitral regurgitation.  Structurally normal   tricuspid valve. There is trace tricuspid regurgitation.There was   insufficient   TR detected from which to calculate pulmonary artery systolic pressure.    The   pulmonic valve is not well visualized. No pulmonic regurgitation   noted.The   right ventricle is normal in size and function.There is moderate   concentric   left ventricular hypertrophy. The left ventricular wall motion is   normal. The   left ventricular ejection fraction is 63%.  No aortic root   dilatation.There is   no pericardial effusion.When compared to prior study performed on   10/21/16,   there isno significant change.  Procedure Details  A complete two-dimensional transthoracic echocardiogram was performed (2D,  M-mode, spectral and color flow doppler).  Study Quality: Fair.  Left Ventricle  There is moderate concentric left ventricular hypertrophy.  The left ventricular wall motion is normal.  The left ventricular ejection fraction is 63%.  Left Atrium  The left atrial size is normal.  Right Atrium  Right atrial size is normal.  Right Ventricle  The right ventricle is normal in size andfunction.  Aortic Valve  There is mild aortic valve thickening.  The aortic valve is trileaflet.  There is moderate aortic regurgitation.  No hemodynamically significant valvular aortic stenosis.  Mitral Valve  There is mild mitral valve thickening.  There is mild mitral annular calcification.  There is trace mitral regurgitation.  Tricuspid Valve  Structurally normal tricuspid valve.  There was insufficient TR detected from which to calculate pulmonary   artery  systolic pressure.  There is trace tricuspid regurgitation.  Pulmonic Valve  The pulmonic valve is not well visualized.  No pulmonic regurgitation noted.  Arteries and Venous System  No aortic root dilatation.  The inferior vena cava is normal in size (<2.1 cm) with normal inspiratory  collapse (>50%) consistent with normal right atrial pressure.  Pericardium / Pleura  There is no pericardial effusion.  Doppler Measurements & Calculations  MV E point: 67.9 cm/sec  MV A point: 91.3 cm/sec  MV E/A: 0.7  MV dec time: 0.2 sec  Ao V2 max: 172.4 cm/sec  Ao max P.9 mmHg  Ao max PG (full): 5.5 mmHg  SUSANA(V,A): 2.4 cm^2  SUSANA(V,D): 2.4 cm^2  AI max lynette: 399.1 cm/sec  AI max P.7 mmHg  AI dec slope: 275.9 cm/sec^2  AI P1/2t: 423.7 msec  LV max P.4 mmHg  LV V1 max: 126.5 cm/sec  MMode 2D Measurements & Calculations  IVSd: 1.3 cm  LVIDd: 4.2 cm  LVIDs: 2.9 cm  LVPWd: 1.1 cm  IVS/LVPW: 1.2  FS: 31.3 %  EDV(Teich): 80.1 ml  ESV(Teich): 32.5 ml  LV mass(C)d: 180.5 grams  LV mass(C)dI: 97.9 grams/m^2  SI(cubed): 27.8 ml/m^2  Ao root diam: 3.3 cm  Ao root area: 8.8 cm^2  LA dimension: 4.6 cm  LA/Ao: 1.4  LVOT diam: 2.0 cm  LVOT area: 3.2 cm^2  LVOT area (M): 3.8 cm^2  LVLd ap4: 7.6 cm  EDV(MOD-sp4): 70 ml  LVLs ap4: 6.6 cm  ESV(MOD-sp4): 22 ml  EF(MOD-sp4): 68.6 %  LVLd ap2: 7.4 cm  EDV(MOD-sp2): 43 ml  LVLs ap2: 5.9 cm  ESV(MOD-sp2): 16 ml  EF(MOD-sp2): 62.8 %  SV(MOD-sp4): 48 ml  SI(MOD-sp4): 26.0 ml/m^2  SV(MOD-sp2): 27 ml  SI(MOD-sp2): 14.6 ml/m^2  Interpreting Physician:OMAR Dias electronically signed on   2018 12:45:03        =========================================================================  ASSESSMENT:    67yFemale with a history of Remote Asthma, DVT, TIA, COPD, Moderate AI, Normal LVEF, pAF on Digoxin/Eliquis prior to admission, Colorectal CA p/w bleeding s/p SCC resection POD#1  - presenting for SCC resection.    Recs  - Resume Eliquis when able  - Hold dig until level is back   - Hold Diovan  - Careful with fluid load given AI  - Thank you for this consult.  - Will Follow      Please call with questions.     Baron Tristan MD, [facility]  [phone]

## 2018-07-27 NOTE — DIETITIAN INITIAL EVALUATION ADULT. - ADHERENCE
n/a/Pt denies following any therapeutic diets PTA  Pt with DM, reports checking SMBG 1x daily. Noted to have HgbA1c 7.4%.

## 2018-07-27 NOTE — PROGRESS NOTE ADULT - ATTENDING COMMENTS
as above-  Pulm stable--TBM, asthma, chronic bronchitis-atelectasis due to pain  symbicort/spiriva/supplemental O2, medrol 4mg-out pt xolair  acapella -incentive spirometry  pain control  ambulate--as per CRS    Eulalio Allison MD-Pulmonary   [phone] as above-Stable at present  Pulm stable--TBM, asthma, chronic bronchitis-atelectasis due to pain  symbicort/spiriva/supplemental O2, medrol 4mg-out pt xolair  acapella -incentive spirometry  pain control  ambulate--as per CRS    Eulalio Allison MD-Pulmonary   [phone] as above-Stable at present--off abx  Pulm stable--TBM, asthma, chronic bronchitis-atelectasis due to pain  symbicort/spiriva/supplemental O2, medrol 4mg-out pt xolair  acapella -incentive spirometry  pain control  ambulate--as per CRS    Eulalio Allison MD-Pulmonary   [phone]

## 2018-07-27 NOTE — PROGRESS NOTE ADULT - SUBJECTIVE AND OBJECTIVE BOX
Surgery Progress Note    S: Patient seen and examined. Patient reports nausea and one episode of emesis. NGT placed.     O:  Vital Signs Last 24 Hrs  T(C): 36.7 (27 Jul 2018 06:22), Max: 38.1 (26 Jul 2018 09:57)  T(F): 98.1 (27 Jul 2018 06:22), Max: 100.6 (26 Jul 2018 09:57)  HR: 95 (27 Jul 2018 06:22) (86 - 97)  BP: 151/65 (27 Jul 2018 06:22) (101/64 - 151/65)  BP(mean): --  RR: 20 (27 Jul 2018 06:22) (18 - 20)  SpO2: 94% (27 Jul 2018 06:22) (94% - 100%)    I&O's Detail    26 Jul 2018 07:01  -  27 Jul 2018 07:00  --------------------------------------------------------  IN:    dextrose 5% + sodium chloride 0.45% with potassium chloride 20 mEq/L: 1800 mL    IV PiggyBack: 100 mL  Total IN: 1900 mL    OUT:    Indwelling Catheter - Urethral: 800 mL  Total OUT: 800 mL    Total NET: 1100 mL      27 Jul 2018 07:01  -  27 Jul 2018 08:35  --------------------------------------------------------  IN:  Total IN: 0 mL    OUT:    Nasoenteral Tube: 300 mL  Total OUT: 300 mL    Total NET: -300 mL          MEDICATIONS  (STANDING):  ALBUTerol/ipratropium for Nebulization 3 milliLiter(s) Nebulizer every 6 hours  buDESOnide 160 MICROgram(s)/formoterol 4.5 MICROgram(s) Inhaler 2 Puff(s) Inhalation two times a day  dextrose 5% + sodium chloride 0.45% with potassium chloride 20 mEq/L 1000 milliLiter(s) (75 mL/Hr) IV Continuous <Continuous>  dextrose 5%. 1000 milliLiter(s) (50 mL/Hr) IV Continuous <Continuous>  dextrose 50% Injectable 12.5 Gram(s) IV Push once  dextrose 50% Injectable 25 Gram(s) IV Push once  dextrose 50% Injectable 25 Gram(s) IV Push once  digoxin  Injectable 0.25 milliGRAM(s) IV Push daily  heparin  Injectable 5000 Unit(s) SubCutaneous every 8 hours  hydrocortisone sodium succinate Injectable 20 milliGRAM(s) IV Push daily  insulin lispro (HumaLOG) corrective regimen sliding scale   SubCutaneous every 6 hours  morphine PCA (5 mG/mL) 30 milliLiter(s) PCA Continuous PCA Continuous  pantoprazole  Injectable 40 milliGRAM(s) IV Push daily  tiotropium 18 MICROgram(s) Capsule 1 Capsule(s) Inhalation daily    MEDICATIONS  (PRN):  ALBUTerol    90 MICROgram(s) HFA Inhaler 2 Puff(s) Inhalation every 6 hours PRN Shortness of Breath  dextrose 40% Gel 15 Gram(s) Oral once PRN Blood Glucose LESS THAN 70 milliGRAM(s)/deciliter  diphenhydrAMINE   Injectable 12.5 milliGRAM(s) IV Push every 4 hours PRN Itching  glucagon  Injectable 1 milliGRAM(s) IntraMuscular once PRN Glucose LESS THAN 70 milligrams/deciliter  metoclopramide Injectable 10 milliGRAM(s) IV Push every 6 hours PRN Nausea  morphine PCA (5 mG/mL) Rescue Clinician Bolus 2.5 milliGRAM(s) IV Push every 15 minutes PRN for Pain Scale GREATER THAN 6  naloxone Injectable 0.1 milliGRAM(s) IV Push every 3 minutes PRN For ANY of the following changes in patient status:  A. RR LESS THAN 10 breaths per minute, B. Oxygen saturation LESS THAN 90%, C. Sedation score of 6                            12.2   11.12 )-----------( 212      ( 26 Jul 2018 08:05 )             38.1       07-26    139  |  102  |  4<L>  ----------------------------<  184<H>  3.6   |  29  |  0.67    Ca    8.2<L>      26 Jul 2018 07:18  Phos  5.1     07-26  Mg     1.9     07-26        Physical Exam:  Gen: Laying in bed, NAD  Resp: Unlabored breathing  Abd: soft, mild TTP, distended, dressings clean, dry, and intact, wound vac in place, ostomy pink/viable   Ext: WWP  Skin: No rashes

## 2018-07-27 NOTE — PHYSICAL THERAPY INITIAL EVALUATION ADULT - GENERAL OBSERVATIONS, REHAB EVAL
Pt rec'ed reclined in bedside chair, +NGT to wall suction, +PCA pump, +wound VAC to anus, +IV, nikhil. PT WC & functional assessment w/ VAC change w/o adverse reaction

## 2018-07-27 NOTE — PROGRESS NOTE ADULT - ASSESSMENT
68yo F POD 2 s/p APR     -PT eval  -NPO/IVF  -Await GI function   -Monitor HR in setting of chronic A fib  -IV digoxin for rate control  -Zofran for nausea  -OOB/IS  -DVT ppx  -Pain control   -IV steroids for adrenal insufficiency   - monitor NGT outpt.     Zuleima Appiah, PGY-1  Red Team Surgery [phone]

## 2018-07-27 NOTE — PHYSICAL THERAPY INITIAL EVALUATION ADULT - MODALITIES TREATMENT COMMENTS
PT WC order rec'ed for VAC management to anal surgical wound s/p APR 7/24/18. Wound rec'ed C/D/I, no odor, no purulence, no erythema. Wound measuring 8cm x 1.5cm x 3.5cm, largely granular. Wound cleansed w/ NS, cavilon to periwound, black granufoam, stomapaste used to assist w/ seal, good seal 125mmH,g continuous. Pt left supine in bed, NAD, call bell in reach, RN/Jessica aware, purposeful proactive rounding took place.

## 2018-07-27 NOTE — PROGRESS NOTE ADULT - SUBJECTIVE AND OBJECTIVE BOX
Day 3 of Anesthesia Pain Management Service    SUBJECTIVE: Patient is doing well with IV PCA    Pain Scale Score:	[X] Refer to charted pain scores    THERAPY:    [X ] IV PCA Morphine		[X ] 5 mg/mL	[ ] 1 mg/mL  [  ] IV PCA Hydromorphone	[ ] 5 mg/mL	[  ] 1 mg/mL  [ ] IV PCA Fentanyl		[ ] 50 micrograms/mL    Demand dose: 1mg     Lockout: 6 minutes   Continuous Rate: 0 mg/hr  4 Hour Limit: 14 mg    MEDICATIONS  (STANDING):  ALBUTerol/ipratropium for Nebulization 3 milliLiter(s) Nebulizer every 6 hours  buDESOnide 160 MICROgram(s)/formoterol 4.5 MICROgram(s) Inhaler 2 Puff(s) Inhalation two times a day  dextrose 5% + sodium chloride 0.45% with potassium chloride 20 mEq/L 1000 milliLiter(s) (75 mL/Hr) IV Continuous <Continuous>  dextrose 5%. 1000 milliLiter(s) (50 mL/Hr) IV Continuous <Continuous>  dextrose 50% Injectable 12.5 Gram(s) IV Push once  dextrose 50% Injectable 25 Gram(s) IV Push once  dextrose 50% Injectable 25 Gram(s) IV Push once  digoxin  Injectable 0.25 milliGRAM(s) IV Push daily  enoxaparin Injectable 40 milliGRAM(s) SubCutaneous daily  hydrocortisone sodium succinate Injectable 20 milliGRAM(s) IV Push daily  insulin lispro (HumaLOG) corrective regimen sliding scale   SubCutaneous every 6 hours  morphine PCA (5 mG/mL) 30 milliLiter(s) PCA Continuous PCA Continuous  pantoprazole  Injectable 40 milliGRAM(s) IV Push daily  tiotropium 18 MICROgram(s) Capsule 1 Capsule(s) Inhalation daily    MEDICATIONS  (PRN):  ALBUTerol    90 MICROgram(s) HFA Inhaler 2 Puff(s) Inhalation every 6 hours PRN Shortness of Breath  dextrose 40% Gel 15 Gram(s) Oral once PRN Blood Glucose LESS THAN 70 milliGRAM(s)/deciliter  diphenhydrAMINE   Injectable 12.5 milliGRAM(s) IV Push every 4 hours PRN Itching  glucagon  Injectable 1 milliGRAM(s) IntraMuscular once PRN Glucose LESS THAN 70 milligrams/deciliter  metoclopramide Injectable 10 milliGRAM(s) IV Push every 6 hours PRN Nausea  morphine PCA (5 mG/mL) Rescue Clinician Bolus 2.5 milliGRAM(s) IV Push every 15 minutes PRN for Pain Scale GREATER THAN 6  naloxone Injectable 0.1 milliGRAM(s) IV Push every 3 minutes PRN For ANY of the following changes in patient status:  A. RR LESS THAN 10 breaths per minute, B. Oxygen saturation LESS THAN 90%, C. Sedation score of 6      OBJECTIVE:    Sedation Score:	[ X] Alert	[ ] Drowsy 	[ ] Arousable	[ ] Asleep	[ ] Unresponsive    Side Effects:	[X ] None	[ ] Nausea	[ ] Vomiting	[ ] Pruritus  		[ ] Other:    Vital Signs Last 24 Hrs  T(C): 37.3 (27 Jul 2018 09:22), Max: 37.3 (27 Jul 2018 09:22)  T(F): 99.1 (27 Jul 2018 09:22), Max: 99.1 (27 Jul 2018 09:22)  HR: 104 (27 Jul 2018 09:22) (86 - 104)  BP: 127/84 (27 Jul 2018 09:22) (101/64 - 151/65)  BP(mean): --  RR: 18 (27 Jul 2018 09:22) (18 - 20)  SpO2: 96% (27 Jul 2018 09:22) (94% - 98%)    ASSESSMENT/ PLAN    Therapy to  be:               [X] Continued   [ ] Discontinued   [ ] Changed to PRN Analgesics    Documentation and Verification of current medications:   [X] Done	[ ] Not done, not eligible    Comments: +NGT. Using 0-2x/hr. Reeducated

## 2018-07-27 NOTE — PROGRESS NOTE ADULT - SUBJECTIVE AND OBJECTIVE BOX
CHIEF COMPLAINT: f/up sob, asthma, tbm, chronic bronchitis, allergy--    Interval Events:    REVIEW OF SYSTEMS:  Constitutional: No fevers or chills. No weight loss. No fatigue or generalized malaise.  Eyes: No itching or discharge from the eyes  ENT: No ear pain. No ear discharge. No nasal congestion. No post nasal drip. No epistaxis. No throat pain. No sore throat. No difficulty swallowing.   CV: No chest pain. No palpitations. No lightheadedness or dizziness.   Resp: No dyspnea at rest. No dyspnea on exertion. No orthopnea. No wheezing. No cough. No stridor. No sputum production. No chest pain with respiration.  GI: No nausea. No vomiting. No diarrhea.  MSK: No joint pain or pain in any extremities  Integumentary: No skin lesions. No pedal edema.  Neurological: No gross motor weakness. No sensory changes.  [ ] All other systems negative  [ ] Unable to assess ROS because ________    OBJECTIVE:  ICU Vital Signs Last 24 Hrs  T(C): 36.8 (27 Jul 2018 01:35), Max: 38.1 (26 Jul 2018 09:57)  T(F): 98.3 (27 Jul 2018 01:35), Max: 100.6 (26 Jul 2018 09:57)  HR: 90 (27 Jul 2018 01:35) (86 - 98)  BP: 126/67 (27 Jul 2018 01:35) (101/64 - 147/83)  BP(mean): --  ABP: --  ABP(mean): --  RR: 18 (27 Jul 2018 01:35) (18 - 18)  SpO2: 97% (27 Jul 2018 01:35) (97% - 100%)        07-25 @ 07:01 - 07-26 @ 07:00  --------------------------------------------------------  IN: 2170 mL / OUT: 3445 mL / NET: -1275 mL    07-26 @ 07:01 - 07-27 @ 05:23  --------------------------------------------------------  IN: 1000 mL / OUT: 600 mL / NET: 400 mL      CAPILLARY BLOOD GLUCOSE      POCT Blood Glucose.: 159 mg/dL (26 Jul 2018 23:50)      PHYSICAL EXAM:  General: Awake, alert, oriented X 3.   HEENT: Atraumatic, normocephalic.                 Mallampatti Grade                 No nasal congestion.                No tonsillar or pharyngeal exudates.  Lymph Nodes: No palpable lymphadenopathy  Neck: No JVD. No carotid bruit.   Respiratory: Normal chest expansion                         Normal percussion                         Normal and equal air entry                         No wheeze, rhonchi or rales.  Cardiovascular: S1 S2 normal. No murmurs, rubs or gallops.   Abdomen: Soft, non-tender, non-distended. No organomegaly.  Extremities: Warm to touch. Peripheral pulse palpable. No pedal edema.   Skin: No rashes or skin lesions  Neurological: Motor and sensory examination equal and normal in all four extremities.  Psychiatry: Appropriate mood and affect.    HOSPITAL MEDICATIONS:  MEDICATIONS  (STANDING):  ALBUTerol/ipratropium for Nebulization 3 milliLiter(s) Nebulizer every 6 hours  buDESOnide 160 MICROgram(s)/formoterol 4.5 MICROgram(s) Inhaler 2 Puff(s) Inhalation two times a day  dextrose 5% + sodium chloride 0.45% with potassium chloride 20 mEq/L 1000 milliLiter(s) (75 mL/Hr) IV Continuous <Continuous>  dextrose 5%. 1000 milliLiter(s) (50 mL/Hr) IV Continuous <Continuous>  dextrose 50% Injectable 12.5 Gram(s) IV Push once  dextrose 50% Injectable 25 Gram(s) IV Push once  dextrose 50% Injectable 25 Gram(s) IV Push once  digoxin  Injectable 0.25 milliGRAM(s) IV Push daily  heparin  Injectable 5000 Unit(s) SubCutaneous every 8 hours  hydrocortisone sodium succinate Injectable 20 milliGRAM(s) IV Push daily  insulin lispro (HumaLOG) corrective regimen sliding scale   SubCutaneous every 6 hours  morphine PCA (5 mG/mL) 30 milliLiter(s) PCA Continuous PCA Continuous  pantoprazole  Injectable 40 milliGRAM(s) IV Push daily  tiotropium 18 MICROgram(s) Capsule 1 Capsule(s) Inhalation daily    MEDICATIONS  (PRN):  ALBUTerol    90 MICROgram(s) HFA Inhaler 2 Puff(s) Inhalation every 6 hours PRN Shortness of Breath  dextrose 40% Gel 15 Gram(s) Oral once PRN Blood Glucose LESS THAN 70 milliGRAM(s)/deciliter  diphenhydrAMINE   Injectable 12.5 milliGRAM(s) IV Push every 4 hours PRN Itching  glucagon  Injectable 1 milliGRAM(s) IntraMuscular once PRN Glucose LESS THAN 70 milligrams/deciliter  metoclopramide Injectable 10 milliGRAM(s) IV Push every 6 hours PRN Nausea  morphine PCA (5 mG/mL) Rescue Clinician Bolus 2.5 milliGRAM(s) IV Push every 15 minutes PRN for Pain Scale GREATER THAN 6  naloxone Injectable 0.1 milliGRAM(s) IV Push every 3 minutes PRN For ANY of the following changes in patient status:  A. RR LESS THAN 10 breaths per minute, B. Oxygen saturation LESS THAN 90%, C. Sedation score of 6      LABS:                        12.2   11.12 )-----------( 212      ( 26 Jul 2018 08:05 )             38.1     07-26    139  |  102  |  4<L>  ----------------------------<  184<H>  3.6   |  29  |  0.67    Ca    8.2<L>      26 Jul 2018 07:18  Phos  5.1     07-26  Mg     1.9     07-26                MICROBIOLOGY:     RADIOLOGY:  [ ] Reviewed and interpreted by me    Point of Care Ultrasound Findings:    PFT:    EKG: CHIEF COMPLAINT: f/up sob, asthma, tbm, chronic bronchitis, allergy--fatigued--OK sleep--better breathing--mild cough; better pain control    Interval Events: cards f/up    REVIEW OF SYSTEMS:  Constitutional: No fevers or chills. No weight loss. + fatigue or generalized malaise.  Eyes: No itching or discharge from the eyes  ENT: No ear pain. No ear discharge. No nasal congestion. No post nasal drip. No epistaxis. No throat pain. No sore throat. No difficulty swallowing.   CV: No chest pain. No palpitations. No lightheadedness or dizziness.   Resp: No dyspnea at rest. + dyspnea on exertion. No orthopnea. No wheezing. No cough. No stridor. No sputum production. No chest pain with respiration.  GI: No nausea. No vomiting. No diarrhea.  MSK: No joint pain or pain in any extremities  Integumentary: No skin lesions. No pedal edema.  Neurological: No gross motor weakness. No sensory changes.  [+ ] All other systems negative  [ ] Unable to assess ROS because ________    OBJECTIVE:  ICU Vital Signs Last 24 Hrs  T(C): 36.8 (27 Jul 2018 01:35), Max: 38.1 (26 Jul 2018 09:57)  T(F): 98.3 (27 Jul 2018 01:35), Max: 100.6 (26 Jul 2018 09:57)  HR: 90 (27 Jul 2018 01:35) (86 - 98)  BP: 126/67 (27 Jul 2018 01:35) (101/64 - 147/83)  BP(mean): --  ABP: --  ABP(mean): --  RR: 18 (27 Jul 2018 01:35) (18 - 18)  SpO2: 97% (27 Jul 2018 01:35) (97% - 100%)        07-25 @ 07:01  -  07-26 @ 07:00  --------------------------------------------------------  IN: 2170 mL / OUT: 3445 mL / NET: -1275 mL    07-26 @ 07:01  -  07-27 @ 05:23  --------------------------------------------------------  IN: 1000 mL / OUT: 600 mL / NET: 400 mL      CAPILLARY BLOOD GLUCOSE      POCT Blood Glucose.: 159 mg/dL (26 Jul 2018 23:50)      PHYSICAL EXAM: in bed on O2--NC-NAD  General: Awake, alert, oriented X 3.   HEENT: Atraumatic, normocephalic.                 Mallampatti Grade 3                No nasal congestion.                No tonsillar or pharyngeal exudates.  Lymph Nodes: No palpable lymphadenopathy  Neck: No JVD. No carotid bruit.   Respiratory: reduced chest expansion                         Normal percussion                         reduced but equal air entry                         No wheeze, rhonchi or rales.  Cardiovascular: S1 S2 normal. + murmurs, rubs or gallops.   Abdomen: Soft, +-tender, non-distended. No organomegaly. reduced bs  Extremities: Warm to touch. Peripheral pulse palpable. No pedal edema.   Skin: No rashes or skin lesions  Neurological: Motor and sensory examination equal and normal in all four extremities.  Psychiatry: Appropriate mood and affect.    HOSPITAL MEDICATIONS:  MEDICATIONS  (STANDING):  ALBUTerol/ipratropium for Nebulization 3 milliLiter(s) Nebulizer every 6 hours  buDESOnide 160 MICROgram(s)/formoterol 4.5 MICROgram(s) Inhaler 2 Puff(s) Inhalation two times a day  dextrose 5% + sodium chloride 0.45% with potassium chloride 20 mEq/L 1000 milliLiter(s) (75 mL/Hr) IV Continuous <Continuous>  dextrose 5%. 1000 milliLiter(s) (50 mL/Hr) IV Continuous <Continuous>  dextrose 50% Injectable 12.5 Gram(s) IV Push once  dextrose 50% Injectable 25 Gram(s) IV Push once  dextrose 50% Injectable 25 Gram(s) IV Push once  digoxin  Injectable 0.25 milliGRAM(s) IV Push daily  heparin  Injectable 5000 Unit(s) SubCutaneous every 8 hours  hydrocortisone sodium succinate Injectable 20 milliGRAM(s) IV Push daily  insulin lispro (HumaLOG) corrective regimen sliding scale   SubCutaneous every 6 hours  morphine PCA (5 mG/mL) 30 milliLiter(s) PCA Continuous PCA Continuous  pantoprazole  Injectable 40 milliGRAM(s) IV Push daily  tiotropium 18 MICROgram(s) Capsule 1 Capsule(s) Inhalation daily    MEDICATIONS  (PRN):  ALBUTerol    90 MICROgram(s) HFA Inhaler 2 Puff(s) Inhalation every 6 hours PRN Shortness of Breath  dextrose 40% Gel 15 Gram(s) Oral once PRN Blood Glucose LESS THAN 70 milliGRAM(s)/deciliter  diphenhydrAMINE   Injectable 12.5 milliGRAM(s) IV Push every 4 hours PRN Itching  glucagon  Injectable 1 milliGRAM(s) IntraMuscular once PRN Glucose LESS THAN 70 milligrams/deciliter  metoclopramide Injectable 10 milliGRAM(s) IV Push every 6 hours PRN Nausea  morphine PCA (5 mG/mL) Rescue Clinician Bolus 2.5 milliGRAM(s) IV Push every 15 minutes PRN for Pain Scale GREATER THAN 6  naloxone Injectable 0.1 milliGRAM(s) IV Push every 3 minutes PRN For ANY of the following changes in patient status:  A. RR LESS THAN 10 breaths per minute, B. Oxygen saturation LESS THAN 90%, C. Sedation score of 6      LABS:                        12.2   11.12 )-----------( 212      ( 26 Jul 2018 08:05 )             38.1     07-26    139  |  102  |  4<L>  ----------------------------<  184<H>  3.6   |  29  |  0.67    Ca    8.2<L>      26 Jul 2018 07:18  Phos  5.1     07-26  Mg     1.9     07-26                MICROBIOLOGY:     RADIOLOGY:  [ ] Reviewed and interpreted by me    Point of Care Ultrasound Findings:    PFT:    EKG:

## 2018-07-27 NOTE — PHYSICAL THERAPY INITIAL EVALUATION ADULT - PERTINENT HX OF CURRENT PROBLEM, REHAB EVAL
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

## 2018-07-28 LAB
ANION GAP SERPL CALC-SCNC: 12 MMOL/L — SIGNIFICANT CHANGE UP (ref 5–17)
APPEARANCE UR: ABNORMAL
BILIRUB UR-MCNC: NEGATIVE — SIGNIFICANT CHANGE UP
BUN SERPL-MCNC: 10 MG/DL — SIGNIFICANT CHANGE UP (ref 7–23)
CALCIUM SERPL-MCNC: 8.4 MG/DL — SIGNIFICANT CHANGE UP (ref 8.4–10.5)
CHLORIDE SERPL-SCNC: 94 MMOL/L — LOW (ref 96–108)
CO2 SERPL-SCNC: 27 MMOL/L — SIGNIFICANT CHANGE UP (ref 22–31)
COLOR SPEC: ABNORMAL
CREAT SERPL-MCNC: 0.68 MG/DL — SIGNIFICANT CHANGE UP (ref 0.5–1.3)
DIFF PNL FLD: ABNORMAL
DIGOXIN SERPL-MCNC: 0.6 NG/ML — LOW (ref 0.8–2)
GLUCOSE BLDC GLUCOMTR-MCNC: 165 MG/DL — HIGH (ref 70–99)
GLUCOSE BLDC GLUCOMTR-MCNC: 179 MG/DL — HIGH (ref 70–99)
GLUCOSE BLDC GLUCOMTR-MCNC: 194 MG/DL — HIGH (ref 70–99)
GLUCOSE BLDC GLUCOMTR-MCNC: 244 MG/DL — HIGH (ref 70–99)
GLUCOSE SERPL-MCNC: 251 MG/DL — HIGH (ref 70–99)
GLUCOSE UR QL: 1000 MG/DL
HCT VFR BLD CALC: 37.7 % — SIGNIFICANT CHANGE UP (ref 34.5–45)
HCT VFR BLD CALC: 40.6 % — SIGNIFICANT CHANGE UP (ref 34.5–45)
HGB BLD-MCNC: 11.8 G/DL — SIGNIFICANT CHANGE UP (ref 11.5–15.5)
HGB BLD-MCNC: 12.7 G/DL — SIGNIFICANT CHANGE UP (ref 11.5–15.5)
KETONES UR-MCNC: ABNORMAL
LEUKOCYTE ESTERASE UR-ACNC: NEGATIVE — SIGNIFICANT CHANGE UP
MAGNESIUM SERPL-MCNC: 2.1 MG/DL — SIGNIFICANT CHANGE UP (ref 1.6–2.6)
MCHC RBC-ENTMCNC: 23.5 PG — LOW (ref 27–34)
MCHC RBC-ENTMCNC: 24.1 PG — LOW (ref 27–34)
MCHC RBC-ENTMCNC: 31.3 GM/DL — LOW (ref 32–36)
MCHC RBC-ENTMCNC: 31.4 GM/DL — LOW (ref 32–36)
MCV RBC AUTO: 75.1 FL — LOW (ref 80–100)
MCV RBC AUTO: 76.6 FL — LOW (ref 80–100)
NITRITE UR-MCNC: NEGATIVE — SIGNIFICANT CHANGE UP
PH UR: 6 — SIGNIFICANT CHANGE UP (ref 5–8)
PHOSPHATE SERPL-MCNC: 2.1 MG/DL — LOW (ref 2.5–4.5)
PLATELET # BLD AUTO: 216 K/UL — SIGNIFICANT CHANGE UP (ref 150–400)
PLATELET # BLD AUTO: 221 K/UL — SIGNIFICANT CHANGE UP (ref 150–400)
POTASSIUM SERPL-MCNC: 3.9 MMOL/L — SIGNIFICANT CHANGE UP (ref 3.5–5.3)
POTASSIUM SERPL-SCNC: 3.9 MMOL/L — SIGNIFICANT CHANGE UP (ref 3.5–5.3)
PROT UR-MCNC: 100 MG/DL
RBC # BLD: 5.02 M/UL — SIGNIFICANT CHANGE UP (ref 3.8–5.2)
RBC # BLD: 5.3 M/UL — HIGH (ref 3.8–5.2)
RBC # FLD: 14.5 % — SIGNIFICANT CHANGE UP (ref 10.3–14.5)
RBC # FLD: 16.2 % — HIGH (ref 10.3–14.5)
SODIUM SERPL-SCNC: 133 MMOL/L — LOW (ref 135–145)
SP GR SPEC: 1.03 — HIGH (ref 1.01–1.02)
UROBILINOGEN FLD QL: NEGATIVE — SIGNIFICANT CHANGE UP
WBC # BLD: 6 K/UL — SIGNIFICANT CHANGE UP (ref 3.8–10.5)
WBC # BLD: 6.17 K/UL — SIGNIFICANT CHANGE UP (ref 3.8–10.5)
WBC # FLD AUTO: 6 K/UL — SIGNIFICANT CHANGE UP (ref 3.8–10.5)
WBC # FLD AUTO: 6.17 K/UL — SIGNIFICANT CHANGE UP (ref 3.8–10.5)

## 2018-07-28 PROCEDURE — 99233 SBSQ HOSP IP/OBS HIGH 50: CPT | Mod: GC

## 2018-07-28 PROCEDURE — 71045 X-RAY EXAM CHEST 1 VIEW: CPT | Mod: 26,76

## 2018-07-28 RX ORDER — ACETAMINOPHEN 500 MG
1000 TABLET ORAL ONCE
Qty: 0 | Refills: 0 | Status: COMPLETED | OUTPATIENT
Start: 2018-07-28 | End: 2018-07-28

## 2018-07-28 RX ORDER — SODIUM CHLORIDE 9 MG/ML
500 INJECTION, SOLUTION INTRAVENOUS ONCE
Qty: 0 | Refills: 0 | Status: COMPLETED | OUTPATIENT
Start: 2018-07-28 | End: 2018-07-28

## 2018-07-28 RX ADMIN — Medication 3 MILLILITER(S): at 17:59

## 2018-07-28 RX ADMIN — BUDESONIDE AND FORMOTEROL FUMARATE DIHYDRATE 2 PUFF(S): 160; 4.5 AEROSOL RESPIRATORY (INHALATION) at 17:59

## 2018-07-28 RX ADMIN — Medication 0.25 MILLIGRAM(S): at 13:41

## 2018-07-28 RX ADMIN — Medication 1000 MILLIGRAM(S): at 22:45

## 2018-07-28 RX ADMIN — Medication 2: at 18:54

## 2018-07-28 RX ADMIN — TIOTROPIUM BROMIDE 1 CAPSULE(S): 18 CAPSULE ORAL; RESPIRATORY (INHALATION) at 13:40

## 2018-07-28 RX ADMIN — Medication 2: at 05:48

## 2018-07-28 RX ADMIN — Medication 10 MILLIGRAM(S): at 09:07

## 2018-07-28 RX ADMIN — Medication 4: at 13:41

## 2018-07-28 RX ADMIN — SODIUM CHLORIDE 500 MILLILITER(S): 9 INJECTION, SOLUTION INTRAVENOUS at 18:26

## 2018-07-28 RX ADMIN — Medication 3 MILLILITER(S): at 05:48

## 2018-07-28 RX ADMIN — Medication 20 MILLIGRAM(S): at 05:55

## 2018-07-28 RX ADMIN — MORPHINE SULFATE 30 MILLILITER(S): 50 CAPSULE, EXTENDED RELEASE ORAL at 07:24

## 2018-07-28 RX ADMIN — Medication 3 MILLILITER(S): at 11:55

## 2018-07-28 RX ADMIN — PANTOPRAZOLE SODIUM 40 MILLIGRAM(S): 20 TABLET, DELAYED RELEASE ORAL at 11:56

## 2018-07-28 RX ADMIN — BUDESONIDE AND FORMOTEROL FUMARATE DIHYDRATE 2 PUFF(S): 160; 4.5 AEROSOL RESPIRATORY (INHALATION) at 05:48

## 2018-07-28 RX ADMIN — ENOXAPARIN SODIUM 40 MILLIGRAM(S): 100 INJECTION SUBCUTANEOUS at 11:59

## 2018-07-28 RX ADMIN — DEXTROSE MONOHYDRATE, SODIUM CHLORIDE, AND POTASSIUM CHLORIDE 75 MILLILITER(S): 50; .745; 4.5 INJECTION, SOLUTION INTRAVENOUS at 09:09

## 2018-07-28 RX ADMIN — MORPHINE SULFATE 30 MILLILITER(S): 50 CAPSULE, EXTENDED RELEASE ORAL at 19:21

## 2018-07-28 RX ADMIN — Medication 400 MILLIGRAM(S): at 22:15

## 2018-07-28 RX ADMIN — Medication 62.5 MILLIMOLE(S): at 18:05

## 2018-07-28 RX ADMIN — MORPHINE SULFATE 2.5 MILLIGRAM(S): 50 CAPSULE, EXTENDED RELEASE ORAL at 03:49

## 2018-07-28 RX ADMIN — Medication 400 MILLIGRAM(S): at 01:16

## 2018-07-28 RX ADMIN — Medication 2: at 23:59

## 2018-07-28 NOTE — PROVIDER CONTACT NOTE (OTHER) - BACKGROUND
Pt admitted 7/24 for total abdominal peritoneal proctectomy, cysto with u-caths, and wound vac placement.

## 2018-07-28 NOTE — PROGRESS NOTE ADULT - ASSESSMENT
68yo F POD 2 s/p APR     -PT eval  -NPO/IVF  -Await GI function   -Monitor HR in setting of chronic A fib  -IV digoxin for rate control  -Zofran for nausea  -OOB/IS  -DVT ppx  -Pain control   -IV steroids for adrenal insufficiency   - monitor NGT outpt.   -change wound vac     Zuleima Appiah, PGY-1  Red Team Surgery [phone]

## 2018-07-28 NOTE — PROGRESS NOTE ADULT - ASSESSMENT
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

## 2018-07-28 NOTE — PROGRESS NOTE ADULT - SUBJECTIVE AND OBJECTIVE BOX
Day __4_ of Anesthesia Pain Management Service    SUBJECTIVE:    Pain Scale Score	At rest: _2__ 	With Activity: ___ 	[ ] Refer to charted pain scores    THERAPY:    [x ] IV PCA Morphine		[ ] 5 mg/mL	[ ] 1 mg/mL  [ ] IV PCA Hydromorphone	[ ] 5 mg/mL	[ ] 1 mg/mL  [ ] IV PCA Fentanyl		[ ] 50 micrograms/mL    Demand dose  1  lockout   6 (minutes) Continuous Rate   0 Total:     Daily      MEDICATIONS  (STANDING):  ALBUTerol/ipratropium for Nebulization 3 milliLiter(s) Nebulizer every 6 hours  buDESOnide 160 MICROgram(s)/formoterol 4.5 MICROgram(s) Inhaler 2 Puff(s) Inhalation two times a day  dextrose 5% + sodium chloride 0.45% with potassium chloride 20 mEq/L 1000 milliLiter(s) (75 mL/Hr) IV Continuous <Continuous>  dextrose 5%. 1000 milliLiter(s) (50 mL/Hr) IV Continuous <Continuous>  dextrose 50% Injectable 12.5 Gram(s) IV Push once  dextrose 50% Injectable 25 Gram(s) IV Push once  dextrose 50% Injectable 25 Gram(s) IV Push once  digoxin  Injectable 0.25 milliGRAM(s) IV Push daily  enoxaparin Injectable 40 milliGRAM(s) SubCutaneous daily  hydrocortisone sodium succinate Injectable 20 milliGRAM(s) IV Push daily  insulin lispro (HumaLOG) corrective regimen sliding scale   SubCutaneous every 6 hours  morphine PCA (5 mG/mL) 30 milliLiter(s) PCA Continuous PCA Continuous  pantoprazole  Injectable 40 milliGRAM(s) IV Push daily  tiotropium 18 MICROgram(s) Capsule 1 Capsule(s) Inhalation daily    MEDICATIONS  (PRN):  ALBUTerol    90 MICROgram(s) HFA Inhaler 2 Puff(s) Inhalation every 6 hours PRN Shortness of Breath  dextrose 40% Gel 15 Gram(s) Oral once PRN Blood Glucose LESS THAN 70 milliGRAM(s)/deciliter  diphenhydrAMINE   Injectable 12.5 milliGRAM(s) IV Push every 4 hours PRN Itching  glucagon  Injectable 1 milliGRAM(s) IntraMuscular once PRN Glucose LESS THAN 70 milligrams/deciliter  metoclopramide Injectable 10 milliGRAM(s) IV Push every 6 hours PRN Nausea  morphine PCA (5 mG/mL) Rescue Clinician Bolus 2.5 milliGRAM(s) IV Push every 15 minutes PRN for Pain Scale GREATER THAN 6  naloxone Injectable 0.1 milliGRAM(s) IV Push every 3 minutes PRN For ANY of the following changes in patient status:  A. RR LESS THAN 10 breaths per minute, B. Oxygen saturation LESS THAN 90%, C. Sedation score of 6      OBJECTIVE:    Sedation Score:	[x ] Alert	[ ] Drowsy 	[ ] Arousable	[ ] Asleep	[ ] Unresponsive    Side Effects:	[ x] None	[ ] Nausea	[ ] Vomiting	[ ] Pruritus  		[ ] Other:    Vital Signs Last 24 Hrs  T(C): 36.9 (28 Jul 2018 04:48), Max: 39.2 (28 Jul 2018 00:36)  T(F): 98.4 (28 Jul 2018 04:48), Max: 102.6 (28 Jul 2018 00:36)  HR: 99 (28 Jul 2018 04:48) (99 - 111)  BP: 144/77 (28 Jul 2018 04:48) (121/74 - 148/81)  BP(mean): --  RR: 18 (28 Jul 2018 04:48) (18 - 18)  SpO2: 96% (28 Jul 2018 04:48) (96% - 97%)    ASSESSMENT/ PLAN    Therapy to  be:	[x ] Continue   [ ] Discontinued   [ ] Change to prn Analgesics    Documentation and Verification of current medications:   [X] Done	[ ] Not done, not elligible    Comments:

## 2018-07-28 NOTE — PROGRESS NOTE ADULT - SUBJECTIVE AND OBJECTIVE BOX
Surgery Progress Note    S: Patient seen and examined. No acute events overnight. Pain well controlled. NGT in place. Awaiting ostomy function     O:  Vital Signs Last 24 Hrs  T(C): 37 (28 Jul 2018 09:56), Max: 39.2 (28 Jul 2018 00:36)  T(F): 98.6 (28 Jul 2018 09:56), Max: 102.6 (28 Jul 2018 00:36)  HR: 109 (28 Jul 2018 09:56) (99 - 111)  BP: 113/76 (28 Jul 2018 09:56) (113/76 - 148/81)  BP(mean): --  RR: 18 (28 Jul 2018 09:56) (18 - 18)  SpO2: 97% (28 Jul 2018 09:56) (96% - 97%)    I&O's Detail    27 Jul 2018 07:01  -  28 Jul 2018 07:00  --------------------------------------------------------  IN:    dextrose 5% + sodium chloride 0.45% with potassium chloride 20 mEq/L: 1800 mL    Solution: 100 mL    Solution: 250 mL    Solution: 100 mL  Total IN: 2250 mL    OUT:    Indwelling Catheter - Urethral: 600 mL    Nasoenteral Tube: 1075 mL  Total OUT: 1675 mL    Total NET: 575 mL      28 Jul 2018 07:01  -  28 Jul 2018 11:02  --------------------------------------------------------  IN:  Total IN: 0 mL    OUT:    Indwelling Catheter - Urethral: 75 mL  Total OUT: 75 mL    Total NET: -75 mL          MEDICATIONS  (STANDING):  ALBUTerol/ipratropium for Nebulization 3 milliLiter(s) Nebulizer every 6 hours  buDESOnide 160 MICROgram(s)/formoterol 4.5 MICROgram(s) Inhaler 2 Puff(s) Inhalation two times a day  dextrose 5% + sodium chloride 0.45% with potassium chloride 20 mEq/L 1000 milliLiter(s) (75 mL/Hr) IV Continuous <Continuous>  dextrose 5%. 1000 milliLiter(s) (50 mL/Hr) IV Continuous <Continuous>  dextrose 50% Injectable 12.5 Gram(s) IV Push once  dextrose 50% Injectable 25 Gram(s) IV Push once  dextrose 50% Injectable 25 Gram(s) IV Push once  digoxin  Injectable 0.25 milliGRAM(s) IV Push daily  enoxaparin Injectable 40 milliGRAM(s) SubCutaneous daily  hydrocortisone sodium succinate Injectable 20 milliGRAM(s) IV Push daily  insulin lispro (HumaLOG) corrective regimen sliding scale   SubCutaneous every 6 hours  morphine PCA (5 mG/mL) 30 milliLiter(s) PCA Continuous PCA Continuous  pantoprazole  Injectable 40 milliGRAM(s) IV Push daily  tiotropium 18 MICROgram(s) Capsule 1 Capsule(s) Inhalation daily    MEDICATIONS  (PRN):  ALBUTerol    90 MICROgram(s) HFA Inhaler 2 Puff(s) Inhalation every 6 hours PRN Shortness of Breath  dextrose 40% Gel 15 Gram(s) Oral once PRN Blood Glucose LESS THAN 70 milliGRAM(s)/deciliter  diphenhydrAMINE   Injectable 12.5 milliGRAM(s) IV Push every 4 hours PRN Itching  glucagon  Injectable 1 milliGRAM(s) IntraMuscular once PRN Glucose LESS THAN 70 milligrams/deciliter  metoclopramide Injectable 10 milliGRAM(s) IV Push every 6 hours PRN Nausea  morphine PCA (5 mG/mL) Rescue Clinician Bolus 2.5 milliGRAM(s) IV Push every 15 minutes PRN for Pain Scale GREATER THAN 6  naloxone Injectable 0.1 milliGRAM(s) IV Push every 3 minutes PRN For ANY of the following changes in patient status:  A. RR LESS THAN 10 breaths per minute, B. Oxygen saturation LESS THAN 90%, C. Sedation score of 6                            12.7   6.0   )-----------( 216      ( 28 Jul 2018 02:29 )             40.6       07-28    133<L>  |  94<L>  |  10  ----------------------------<  251<H>  3.9   |  27  |  0.68    Ca    8.4      28 Jul 2018 08:52  Phos  2.1     07-28  Mg     2.1 07-28        Physical Exam:  Gen: Laying in bed, NAD  Resp: Unlabored breathing  Abd: soft, mild pain along the incision, ND, ostomy viable/ pink   Ext: WWP  Skin: No rashes Surgery Progress Note    S: Patient seen and examined. Pain well controlled. NGT in place. Awaiting ostomy function. Patient had fever overnight, fever work-up sent.     O:  Vital Signs Last 24 Hrs  T(C): 37 (28 Jul 2018 09:56), Max: 39.2 (28 Jul 2018 00:36)  T(F): 98.6 (28 Jul 2018 09:56), Max: 102.6 (28 Jul 2018 00:36)  HR: 109 (28 Jul 2018 09:56) (99 - 111)  BP: 113/76 (28 Jul 2018 09:56) (113/76 - 148/81)  BP(mean): --  RR: 18 (28 Jul 2018 09:56) (18 - 18)  SpO2: 97% (28 Jul 2018 09:56) (96% - 97%)    I&O's Detail    27 Jul 2018 07:01  -  28 Jul 2018 07:00  --------------------------------------------------------  IN:    dextrose 5% + sodium chloride 0.45% with potassium chloride 20 mEq/L: 1800 mL    Solution: 100 mL    Solution: 250 mL    Solution: 100 mL  Total IN: 2250 mL    OUT:    Indwelling Catheter - Urethral: 600 mL    Nasoenteral Tube: 1075 mL  Total OUT: 1675 mL    Total NET: 575 mL      28 Jul 2018 07:01  -  28 Jul 2018 11:02  --------------------------------------------------------  IN:  Total IN: 0 mL    OUT:    Indwelling Catheter - Urethral: 75 mL  Total OUT: 75 mL    Total NET: -75 mL          MEDICATIONS  (STANDING):  ALBUTerol/ipratropium for Nebulization 3 milliLiter(s) Nebulizer every 6 hours  buDESOnide 160 MICROgram(s)/formoterol 4.5 MICROgram(s) Inhaler 2 Puff(s) Inhalation two times a day  dextrose 5% + sodium chloride 0.45% with potassium chloride 20 mEq/L 1000 milliLiter(s) (75 mL/Hr) IV Continuous <Continuous>  dextrose 5%. 1000 milliLiter(s) (50 mL/Hr) IV Continuous <Continuous>  dextrose 50% Injectable 12.5 Gram(s) IV Push once  dextrose 50% Injectable 25 Gram(s) IV Push once  dextrose 50% Injectable 25 Gram(s) IV Push once  digoxin  Injectable 0.25 milliGRAM(s) IV Push daily  enoxaparin Injectable 40 milliGRAM(s) SubCutaneous daily  hydrocortisone sodium succinate Injectable 20 milliGRAM(s) IV Push daily  insulin lispro (HumaLOG) corrective regimen sliding scale   SubCutaneous every 6 hours  morphine PCA (5 mG/mL) 30 milliLiter(s) PCA Continuous PCA Continuous  pantoprazole  Injectable 40 milliGRAM(s) IV Push daily  tiotropium 18 MICROgram(s) Capsule 1 Capsule(s) Inhalation daily    MEDICATIONS  (PRN):  ALBUTerol    90 MICROgram(s) HFA Inhaler 2 Puff(s) Inhalation every 6 hours PRN Shortness of Breath  dextrose 40% Gel 15 Gram(s) Oral once PRN Blood Glucose LESS THAN 70 milliGRAM(s)/deciliter  diphenhydrAMINE   Injectable 12.5 milliGRAM(s) IV Push every 4 hours PRN Itching  glucagon  Injectable 1 milliGRAM(s) IntraMuscular once PRN Glucose LESS THAN 70 milligrams/deciliter  metoclopramide Injectable 10 milliGRAM(s) IV Push every 6 hours PRN Nausea  morphine PCA (5 mG/mL) Rescue Clinician Bolus 2.5 milliGRAM(s) IV Push every 15 minutes PRN for Pain Scale GREATER THAN 6  naloxone Injectable 0.1 milliGRAM(s) IV Push every 3 minutes PRN For ANY of the following changes in patient status:  A. RR LESS THAN 10 breaths per minute, B. Oxygen saturation LESS THAN 90%, C. Sedation score of 6                            12.7   6.0   )-----------( 216      ( 28 Jul 2018 02:29 )             40.6       07-28    133<L>  |  94<L>  |  10  ----------------------------<  251<H>  3.9   |  27  |  0.68    Ca    8.4      28 Jul 2018 08:52  Phos  2.1     07-28  Mg     2.1 07-28        Physical Exam:  Gen: Laying in bed, NAD  Resp: Unlabored breathing  Abd: soft, mild pain along the incision, ND, ostomy viable/ pink   Ext: WWP  Skin: No rashes

## 2018-07-28 NOTE — PROGRESS NOTE ADULT - ATTENDING COMMENTS
Patient with copd/tracheomalacia, respiratory status is stable. Continue bronchodilators, acapella device.

## 2018-07-28 NOTE — PROGRESS NOTE ADULT - SUBJECTIVE AND OBJECTIVE BOX
CHIEF COMPLAINT: sob    Interval Events:  Had NG tube replaced this AM (old one fell out).  Had fever overnight.  Denies cough/sob/wheezing/respiratory complaints.    REVIEW OF SYSTEMS:  Constitutional: [ ] negative [x ] fevers [ ] chills [ ] weight loss [ ] weight gain  HEENT: [ x] negative [ ] dry eyes [ ] eye irritation [ ] postnasal drip [ ] nasal congestion  CV: [x ] negative  [ ] chest pain [ ] orthopnea [ ] palpitations [ ] murmur  Resp: [x ] negative [ ] cough [ ] shortness of breath [ ] dyspnea [ ] wheezing [ ] sputum [ ] hemoptysis  GI: [ ] negative [ ] nausea [ ] vomiting [ ] diarrhea [ ] constipation [x ] abd pain [ ] dysphagia   : [x ] negative [ ] dysuria [ ] nocturia [ ] hematuria [ ] increased urinary frequency  Musculoskeletal: [ x] negative [ ] back pain [ ] myalgias [ ] arthralgias [ ] fracture  Skin: [x ] negative [ ] rash [ ] itch  Neurological: [ x] negative [ ] headache [ ] dizziness [ ] syncope [ ] weakness [ ] numbness  Psychiatric: [ x] negative [ ] anxiety [ ] depression  Endocrine: [ x] negative [ ] diabetes [ ] thyroid problem  Hematologic/Lymphatic: [x ] negative [ ] anemia [ ] bleeding problem  Allergic/Immunologic: [ x] negative [ ] itchy eyes [ ] nasal discharge [ ] hives [ ] angioedema  [ ] All other systems negative  [ ] Unable to assess ROS because ________    OBJECTIVE:  ICU Vital Signs Last 24 Hrs  T(C): 37 (2018 09:56), Max: 39.2 (2018 00:36)  T(F): 98.6 (2018 09:56), Max: 102.6 (2018 00:36)  HR: 109 (2018 09:56) (99 - 111)  BP: 113/76 (2018 09:56) (113/76 - 148/81)  BP(mean): --  ABP: --  ABP(mean): --  RR: 18 (2018 09:56) (18 - 18)  SpO2: 97% (2018 09:56) (96% - 97%)         @ 07:01  -   @ 07:00  --------------------------------------------------------  IN: 2250 mL / OUT: 1675 mL / NET: 575 mL     @ 07:01   @ 12:58  --------------------------------------------------------  IN: 0 mL / OUT: 75 mL / NET: -75 mL      CAPILLARY BLOOD GLUCOSE      POCT Blood Glucose.: 179 mg/dL (2018 05:44)      PHYSICAL EXAM:  General: Awake, alert, oriented X 3.   HEENT: Atraumatic, normocephalic.   Lymph Nodes: No palpable lymphadenopathy  Neck: No JVD. No carotid bruit.   Respiratory: decreased air entry bilaterally but equal, no w/r/r  Cardiovascular: rrr, s1/s2, no murmurs appreciated  Abdomen: soft but tender; dressing on abd; not distended  Extremities: no pedal edema  Skin: No rashes or skin lesions  Neurological: non focal exam  Psychiatry: Appropriate mood and affect.    HOSPITAL MEDICATIONS:  MEDICATIONS  (STANDING):  ALBUTerol/ipratropium for Nebulization 3 milliLiter(s) Nebulizer every 6 hours  buDESOnide 160 MICROgram(s)/formoterol 4.5 MICROgram(s) Inhaler 2 Puff(s) Inhalation two times a day  dextrose 5% + sodium chloride 0.45% with potassium chloride 20 mEq/L 1000 milliLiter(s) (75 mL/Hr) IV Continuous <Continuous>  dextrose 5%. 1000 milliLiter(s) (50 mL/Hr) IV Continuous <Continuous>  dextrose 50% Injectable 12.5 Gram(s) IV Push once  dextrose 50% Injectable 25 Gram(s) IV Push once  dextrose 50% Injectable 25 Gram(s) IV Push once  digoxin  Injectable 0.25 milliGRAM(s) IV Push daily  enoxaparin Injectable 40 milliGRAM(s) SubCutaneous daily  hydrocortisone sodium succinate Injectable 20 milliGRAM(s) IV Push daily  insulin lispro (HumaLOG) corrective regimen sliding scale   SubCutaneous every 6 hours  morphine PCA (5 mG/mL) 30 milliLiter(s) PCA Continuous PCA Continuous  pantoprazole  Injectable 40 milliGRAM(s) IV Push daily  sodium phosphate IVPB 15 milliMole(s) IV Intermittent once  tiotropium 18 MICROgram(s) Capsule 1 Capsule(s) Inhalation daily    MEDICATIONS  (PRN):  ALBUTerol    90 MICROgram(s) HFA Inhaler 2 Puff(s) Inhalation every 6 hours PRN Shortness of Breath  dextrose 40% Gel 15 Gram(s) Oral once PRN Blood Glucose LESS THAN 70 milliGRAM(s)/deciliter  diphenhydrAMINE   Injectable 12.5 milliGRAM(s) IV Push every 4 hours PRN Itching  glucagon  Injectable 1 milliGRAM(s) IntraMuscular once PRN Glucose LESS THAN 70 milligrams/deciliter  metoclopramide Injectable 10 milliGRAM(s) IV Push every 6 hours PRN Nausea  morphine PCA (5 mG/mL) Rescue Clinician Bolus 2.5 milliGRAM(s) IV Push every 15 minutes PRN for Pain Scale GREATER THAN 6  naloxone Injectable 0.1 milliGRAM(s) IV Push every 3 minutes PRN For ANY of the following changes in patient status:  A. RR LESS THAN 10 breaths per minute, B. Oxygen saturation LESS THAN 90%, C. Sedation score of 6      LABS:                        11.8   6.17  )-----------( 221      ( 2018 10:42 )             37.7     Hgb Trend: 11.8<--, 12.7<--, 13.6<--, 12.2<--, 11.4<--      133<L>  |  94<L>  |  10  ----------------------------<  251<H>  3.9   |  27  |  0.68    Ca    8.4      2018 08:52  Phos  2.1     -  Mg     2.1           Creatinine Trend: 0.68<--, 0.62<--, 0.67<--, 0.77<--, 0.73<--, 0.78<--    Urinalysis Basic - ( 2018 02:29 )    Color: Brown / Appearance: Turbid / S.028 / pH: x  Gluc: x / Ketone: Small  / Bili: Negative / Urobili: Negative   Blood: x / Protein: 100 mg/dL / Nitrite: Negative   Leuk Esterase: Negative / RBC: 2-5 /HPF / WBC 0-2 /HPF   Sq Epi: x / Non Sq Epi: x / Bacteria: x            MICROBIOLOGY:       RADIOLOGY:  [ ] Reviewed and interpreted by me    PULMONARY FUNCTION TESTS:    EKG:

## 2018-07-29 LAB
ALBUMIN SERPL ELPH-MCNC: 2.3 G/DL — LOW (ref 3.3–5)
ALP SERPL-CCNC: 67 U/L — SIGNIFICANT CHANGE UP (ref 40–120)
ALT FLD-CCNC: 6 U/L — LOW (ref 10–45)
ANION GAP SERPL CALC-SCNC: 10 MMOL/L — SIGNIFICANT CHANGE UP (ref 5–17)
ANION GAP SERPL CALC-SCNC: 11 MMOL/L — SIGNIFICANT CHANGE UP (ref 5–17)
APPEARANCE UR: CLEAR — SIGNIFICANT CHANGE UP
AST SERPL-CCNC: 9 U/L — LOW (ref 10–40)
BILIRUB SERPL-MCNC: 0.4 MG/DL — SIGNIFICANT CHANGE UP (ref 0.2–1.2)
BILIRUB UR-MCNC: NEGATIVE — SIGNIFICANT CHANGE UP
BUN SERPL-MCNC: 5 MG/DL — LOW (ref 7–23)
BUN SERPL-MCNC: 7 MG/DL — SIGNIFICANT CHANGE UP (ref 7–23)
CALCIUM SERPL-MCNC: 8 MG/DL — LOW (ref 8.4–10.5)
CALCIUM SERPL-MCNC: 8.4 MG/DL — SIGNIFICANT CHANGE UP (ref 8.4–10.5)
CHLORIDE SERPL-SCNC: 98 MMOL/L — SIGNIFICANT CHANGE UP (ref 96–108)
CHLORIDE SERPL-SCNC: 99 MMOL/L — SIGNIFICANT CHANGE UP (ref 96–108)
CO2 SERPL-SCNC: 28 MMOL/L — SIGNIFICANT CHANGE UP (ref 22–31)
CO2 SERPL-SCNC: 29 MMOL/L — SIGNIFICANT CHANGE UP (ref 22–31)
COLOR SPEC: SIGNIFICANT CHANGE UP
CREAT SERPL-MCNC: 0.61 MG/DL — SIGNIFICANT CHANGE UP (ref 0.5–1.3)
CREAT SERPL-MCNC: 0.67 MG/DL — SIGNIFICANT CHANGE UP (ref 0.5–1.3)
DIFF PNL FLD: NEGATIVE — SIGNIFICANT CHANGE UP
GLUCOSE BLDC GLUCOMTR-MCNC: 115 MG/DL — HIGH (ref 70–99)
GLUCOSE BLDC GLUCOMTR-MCNC: 179 MG/DL — HIGH (ref 70–99)
GLUCOSE BLDC GLUCOMTR-MCNC: 218 MG/DL — HIGH (ref 70–99)
GLUCOSE SERPL-MCNC: 133 MG/DL — HIGH (ref 70–99)
GLUCOSE SERPL-MCNC: 191 MG/DL — HIGH (ref 70–99)
GLUCOSE UR QL: NEGATIVE — SIGNIFICANT CHANGE UP
HCT VFR BLD CALC: 33.1 % — LOW (ref 34.5–45)
HCT VFR BLD CALC: 33.9 % — LOW (ref 34.5–45)
HGB BLD-MCNC: 10.2 G/DL — LOW (ref 11.5–15.5)
HGB BLD-MCNC: 10.3 G/DL — LOW (ref 11.5–15.5)
KETONES UR-MCNC: NEGATIVE — SIGNIFICANT CHANGE UP
LEUKOCYTE ESTERASE UR-ACNC: ABNORMAL
MAGNESIUM SERPL-MCNC: 1.8 MG/DL — SIGNIFICANT CHANGE UP (ref 1.6–2.6)
MCHC RBC-ENTMCNC: 23.5 PG — LOW (ref 27–34)
MCHC RBC-ENTMCNC: 23.7 PG — LOW (ref 27–34)
MCHC RBC-ENTMCNC: 30.4 GM/DL — LOW (ref 32–36)
MCHC RBC-ENTMCNC: 30.9 GM/DL — LOW (ref 32–36)
MCV RBC AUTO: 76.5 FL — LOW (ref 80–100)
MCV RBC AUTO: 77.2 FL — LOW (ref 80–100)
NITRITE UR-MCNC: NEGATIVE — SIGNIFICANT CHANGE UP
PH UR: 6 — SIGNIFICANT CHANGE UP (ref 5–8)
PHOSPHATE SERPL-MCNC: 2.9 MG/DL — SIGNIFICANT CHANGE UP (ref 2.5–4.5)
PLATELET # BLD AUTO: 194 K/UL — SIGNIFICANT CHANGE UP (ref 150–400)
PLATELET # BLD AUTO: 206 K/UL — SIGNIFICANT CHANGE UP (ref 150–400)
POTASSIUM SERPL-MCNC: 3.7 MMOL/L — SIGNIFICANT CHANGE UP (ref 3.5–5.3)
POTASSIUM SERPL-MCNC: 4.3 MMOL/L — SIGNIFICANT CHANGE UP (ref 3.5–5.3)
POTASSIUM SERPL-SCNC: 3.7 MMOL/L — SIGNIFICANT CHANGE UP (ref 3.5–5.3)
POTASSIUM SERPL-SCNC: 4.3 MMOL/L — SIGNIFICANT CHANGE UP (ref 3.5–5.3)
PROT SERPL-MCNC: 5.7 G/DL — LOW (ref 6–8.3)
PROT UR-MCNC: NEGATIVE — SIGNIFICANT CHANGE UP
RBC # BLD: 4.32 M/UL — SIGNIFICANT CHANGE UP (ref 3.8–5.2)
RBC # BLD: 4.4 M/UL — SIGNIFICANT CHANGE UP (ref 3.8–5.2)
RBC # FLD: 14.1 % — SIGNIFICANT CHANGE UP (ref 10.3–14.5)
RBC # FLD: 14.2 % — SIGNIFICANT CHANGE UP (ref 10.3–14.5)
SODIUM SERPL-SCNC: 137 MMOL/L — SIGNIFICANT CHANGE UP (ref 135–145)
SODIUM SERPL-SCNC: 138 MMOL/L — SIGNIFICANT CHANGE UP (ref 135–145)
SP GR SPEC: <1.005 — LOW (ref 1.01–1.02)
UROBILINOGEN FLD QL: NEGATIVE — SIGNIFICANT CHANGE UP
WBC # BLD: 4.9 K/UL — SIGNIFICANT CHANGE UP (ref 3.8–10.5)
WBC # BLD: 5 K/UL — SIGNIFICANT CHANGE UP (ref 3.8–10.5)
WBC # FLD AUTO: 4.9 K/UL — SIGNIFICANT CHANGE UP (ref 3.8–10.5)
WBC # FLD AUTO: 5 K/UL — SIGNIFICANT CHANGE UP (ref 3.8–10.5)

## 2018-07-29 PROCEDURE — 71045 X-RAY EXAM CHEST 1 VIEW: CPT | Mod: 26

## 2018-07-29 RX ORDER — DEXTROSE MONOHYDRATE, SODIUM CHLORIDE, AND POTASSIUM CHLORIDE 50; .745; 4.5 G/1000ML; G/1000ML; G/1000ML
1000 INJECTION, SOLUTION INTRAVENOUS
Qty: 0 | Refills: 0 | Status: DISCONTINUED | OUTPATIENT
Start: 2018-07-29 | End: 2018-08-01

## 2018-07-29 RX ORDER — ACETAMINOPHEN 500 MG
1000 TABLET ORAL ONCE
Qty: 0 | Refills: 0 | Status: COMPLETED | OUTPATIENT
Start: 2018-07-29 | End: 2018-07-29

## 2018-07-29 RX ORDER — SODIUM CHLORIDE 9 MG/ML
1000 INJECTION, SOLUTION INTRAVENOUS ONCE
Qty: 0 | Refills: 0 | Status: COMPLETED | OUTPATIENT
Start: 2018-07-29 | End: 2018-07-29

## 2018-07-29 RX ADMIN — BUDESONIDE AND FORMOTEROL FUMARATE DIHYDRATE 2 PUFF(S): 160; 4.5 AEROSOL RESPIRATORY (INHALATION) at 05:47

## 2018-07-29 RX ADMIN — MORPHINE SULFATE 30 MILLILITER(S): 50 CAPSULE, EXTENDED RELEASE ORAL at 19:31

## 2018-07-29 RX ADMIN — ENOXAPARIN SODIUM 40 MILLIGRAM(S): 100 INJECTION SUBCUTANEOUS at 13:10

## 2018-07-29 RX ADMIN — DEXTROSE MONOHYDRATE, SODIUM CHLORIDE, AND POTASSIUM CHLORIDE 75 MILLILITER(S): 50; .745; 4.5 INJECTION, SOLUTION INTRAVENOUS at 03:38

## 2018-07-29 RX ADMIN — BUDESONIDE AND FORMOTEROL FUMARATE DIHYDRATE 2 PUFF(S): 160; 4.5 AEROSOL RESPIRATORY (INHALATION) at 18:51

## 2018-07-29 RX ADMIN — MORPHINE SULFATE 30 MILLILITER(S): 50 CAPSULE, EXTENDED RELEASE ORAL at 07:27

## 2018-07-29 RX ADMIN — Medication 20 MILLIGRAM(S): at 05:48

## 2018-07-29 RX ADMIN — Medication 0.25 MILLIGRAM(S): at 13:11

## 2018-07-29 RX ADMIN — Medication 4: at 13:15

## 2018-07-29 RX ADMIN — Medication 3 MILLILITER(S): at 18:51

## 2018-07-29 RX ADMIN — Medication 2: at 06:56

## 2018-07-29 RX ADMIN — TIOTROPIUM BROMIDE 1 CAPSULE(S): 18 CAPSULE ORAL; RESPIRATORY (INHALATION) at 13:12

## 2018-07-29 RX ADMIN — PANTOPRAZOLE SODIUM 40 MILLIGRAM(S): 20 TABLET, DELAYED RELEASE ORAL at 13:12

## 2018-07-29 RX ADMIN — SODIUM CHLORIDE 500 MILLILITER(S): 9 INJECTION, SOLUTION INTRAVENOUS at 16:27

## 2018-07-29 RX ADMIN — MORPHINE SULFATE 30 MILLILITER(S): 50 CAPSULE, EXTENDED RELEASE ORAL at 04:52

## 2018-07-29 RX ADMIN — DEXTROSE MONOHYDRATE, SODIUM CHLORIDE, AND POTASSIUM CHLORIDE 100 MILLILITER(S): 50; .745; 4.5 INJECTION, SOLUTION INTRAVENOUS at 13:10

## 2018-07-29 RX ADMIN — Medication 3 MILLILITER(S): at 05:47

## 2018-07-29 RX ADMIN — Medication 400 MILLIGRAM(S): at 18:51

## 2018-07-29 RX ADMIN — Medication 3 MILLILITER(S): at 13:11

## 2018-07-29 RX ADMIN — Medication 3 MILLILITER(S): at 00:00

## 2018-07-29 NOTE — PROGRESS NOTE ADULT - SUBJECTIVE AND OBJECTIVE BOX
Day _5__ of Anesthesia Pain Management Service    SUBJECTIVE:    Pain Scale Score	At rest: _2__ 	With Activity: ___ 	[ ] Refer to charted pain scores    THERAPY:    [x ] IV PCA Morphine		[ ] 5 mg/mL	[ ] 1 mg/mL  [ ] IV PCA Hydromorphone	[ ] 5 mg/mL	[ ] 1 mg/mL  [ ] IV PCA Fentanyl		[ ] 50 micrograms/mL    Demand dose1    lockout  6  (minutes) Continuous Rate 0   Total:     Daily      MEDICATIONS  (STANDING):  ALBUTerol/ipratropium for Nebulization 3 milliLiter(s) Nebulizer every 6 hours  buDESOnide 160 MICROgram(s)/formoterol 4.5 MICROgram(s) Inhaler 2 Puff(s) Inhalation two times a day  dextrose 5% + sodium chloride 0.45% with potassium chloride 20 mEq/L 1000 milliLiter(s) (75 mL/Hr) IV Continuous <Continuous>  dextrose 5%. 1000 milliLiter(s) (50 mL/Hr) IV Continuous <Continuous>  dextrose 50% Injectable 12.5 Gram(s) IV Push once  dextrose 50% Injectable 25 Gram(s) IV Push once  dextrose 50% Injectable 25 Gram(s) IV Push once  digoxin  Injectable 0.25 milliGRAM(s) IV Push daily  enoxaparin Injectable 40 milliGRAM(s) SubCutaneous daily  hydrocortisone sodium succinate Injectable 20 milliGRAM(s) IV Push daily  insulin lispro (HumaLOG) corrective regimen sliding scale   SubCutaneous every 6 hours  morphine PCA (5 mG/mL) 30 milliLiter(s) PCA Continuous PCA Continuous  pantoprazole  Injectable 40 milliGRAM(s) IV Push daily  tiotropium 18 MICROgram(s) Capsule 1 Capsule(s) Inhalation daily    MEDICATIONS  (PRN):  ALBUTerol    90 MICROgram(s) HFA Inhaler 2 Puff(s) Inhalation every 6 hours PRN Shortness of Breath  dextrose 40% Gel 15 Gram(s) Oral once PRN Blood Glucose LESS THAN 70 milliGRAM(s)/deciliter  diphenhydrAMINE   Injectable 12.5 milliGRAM(s) IV Push every 4 hours PRN Itching  glucagon  Injectable 1 milliGRAM(s) IntraMuscular once PRN Glucose LESS THAN 70 milligrams/deciliter  metoclopramide Injectable 10 milliGRAM(s) IV Push every 6 hours PRN Nausea  morphine PCA (5 mG/mL) Rescue Clinician Bolus 2.5 milliGRAM(s) IV Push every 15 minutes PRN for Pain Scale GREATER THAN 6  naloxone Injectable 0.1 milliGRAM(s) IV Push every 3 minutes PRN For ANY of the following changes in patient status:  A. RR LESS THAN 10 breaths per minute, B. Oxygen saturation LESS THAN 90%, C. Sedation score of 6      OBJECTIVE:    Sedation Score:	[x ] Alert	[ ] Drowsy 	[ ] Arousable	[ ] Asleep	[ ] Unresponsive    Side Effects:	[x None	[ ] Nausea	[ ] Vomiting	[ ] Pruritus  		[ ] Other:    Vital Signs Last 24 Hrs  T(C): 36.8 (29 Jul 2018 01:48), Max: 37.6 (28 Jul 2018 20:20)  T(F): 98.3 (29 Jul 2018 01:48), Max: 99.6 (28 Jul 2018 20:20)  HR: 76 (29 Jul 2018 01:48) (76 - 109)  BP: 126/77 (29 Jul 2018 01:48) (113/76 - 127/80)  BP(mean): --  RR: 18 (29 Jul 2018 01:48) (18 - 18)  SpO2: 100% (29 Jul 2018 01:48) (94% - 100%)    ASSESSMENT/ PLAN    Therapy to  be:	[x ] Continue   [ ] Discontinued   [ ] Change to prn Analgesics    Documentation and Verification of current medications:   [X] Done	[ ] Not done, not elligible    Comments:

## 2018-07-29 NOTE — PROGRESS NOTE ADULT - SUBJECTIVE AND OBJECTIVE BOX
Surgery Progress Note    S: Patient seen and examined. No acute events overnight. NGT in place. Denies any nausea or vomiting. Pain well controlled. Awaiting ostomy function.     O:  Vital Signs Last 24 Hrs  T(C): 37.3 (29 Jul 2018 06:49), Max: 37.6 (28 Jul 2018 20:20)  T(F): 99.1 (29 Jul 2018 06:49), Max: 99.6 (28 Jul 2018 20:20)  HR: 94 (29 Jul 2018 06:49) (76 - 109)  BP: 111/66 (29 Jul 2018 06:49) (111/66 - 127/80)  BP(mean): --  RR: 18 (29 Jul 2018 06:49) (18 - 18)  SpO2: 99% (29 Jul 2018 06:49) (94% - 100%)    I&O's Detail    28 Jul 2018 07:01  -  29 Jul 2018 07:00  --------------------------------------------------------  IN:    dextrose 5% + sodium chloride 0.45% with potassium chloride 20 mEq/L: 1800 mL    Lactated Ringers IV Bolus: 500 mL    Solution: 250 mL  Total IN: 2550 mL    OUT:    Indwelling Catheter - Urethral: 500 mL    Nasoenteral Tube: 650 mL  Total OUT: 1150 mL    Total NET: 1400 mL          MEDICATIONS  (STANDING):  ALBUTerol/ipratropium for Nebulization 3 milliLiter(s) Nebulizer every 6 hours  buDESOnide 160 MICROgram(s)/formoterol 4.5 MICROgram(s) Inhaler 2 Puff(s) Inhalation two times a day  dextrose 5% + sodium chloride 0.45% with potassium chloride 20 mEq/L 1000 milliLiter(s) (75 mL/Hr) IV Continuous <Continuous>  dextrose 5%. 1000 milliLiter(s) (50 mL/Hr) IV Continuous <Continuous>  dextrose 50% Injectable 12.5 Gram(s) IV Push once  dextrose 50% Injectable 25 Gram(s) IV Push once  dextrose 50% Injectable 25 Gram(s) IV Push once  digoxin  Injectable 0.25 milliGRAM(s) IV Push daily  enoxaparin Injectable 40 milliGRAM(s) SubCutaneous daily  hydrocortisone sodium succinate Injectable 20 milliGRAM(s) IV Push daily  insulin lispro (HumaLOG) corrective regimen sliding scale   SubCutaneous every 6 hours  morphine PCA (5 mG/mL) 30 milliLiter(s) PCA Continuous PCA Continuous  pantoprazole  Injectable 40 milliGRAM(s) IV Push daily  tiotropium 18 MICROgram(s) Capsule 1 Capsule(s) Inhalation daily    MEDICATIONS  (PRN):  ALBUTerol    90 MICROgram(s) HFA Inhaler 2 Puff(s) Inhalation every 6 hours PRN Shortness of Breath  dextrose 40% Gel 15 Gram(s) Oral once PRN Blood Glucose LESS THAN 70 milliGRAM(s)/deciliter  diphenhydrAMINE   Injectable 12.5 milliGRAM(s) IV Push every 4 hours PRN Itching  glucagon  Injectable 1 milliGRAM(s) IntraMuscular once PRN Glucose LESS THAN 70 milligrams/deciliter  metoclopramide Injectable 10 milliGRAM(s) IV Push every 6 hours PRN Nausea  morphine PCA (5 mG/mL) Rescue Clinician Bolus 2.5 milliGRAM(s) IV Push every 15 minutes PRN for Pain Scale GREATER THAN 6  naloxone Injectable 0.1 milliGRAM(s) IV Push every 3 minutes PRN For ANY of the following changes in patient status:  A. RR LESS THAN 10 breaths per minute, B. Oxygen saturation LESS THAN 90%, C. Sedation score of 6                            11.8   6.17  )-----------( 221      ( 28 Jul 2018 10:42 )             37.7       07-29    138  |  99  |  7   ----------------------------<  191<H>  3.7   |  29  |  0.67    Ca    8.4      29 Jul 2018 07:21  Phos  2.9     07-29  Mg     1.8     07-29    TPro  5.7<L>  /  Alb  2.3<L>  /  TBili  0.4  /  DBili  x   /  AST  9<L>  /  ALT  6<L>  /  AlkPhos  67  07-29      Physical Exam:  Gen: Laying in bed, NAD  Resp: Unlabored breathing  Abd: soft, TTP along incisions, mildly distended, incisions clean dry and intact, ostomy pink/viable   Ext: WWP  Skin: No rashes

## 2018-07-29 NOTE — PROGRESS NOTE ADULT - ASSESSMENT
68yo F POD 3 s/p APR     -PT eval  -NPO/IVF  -Await GI function   -Monitor HR in setting of chronic A fib  -IV digoxin for rate control  -Zofran for nausea  -OOB/IS  -DVT ppx  -Pain control   -IV steroids for adrenal insufficiency   - monitor NGT outpt.   - wound vac changed 7/28    Zuleima Appiah, PGY-1  Red Team Surgery [phone]

## 2018-07-30 LAB
ANION GAP SERPL CALC-SCNC: 9 MMOL/L — SIGNIFICANT CHANGE UP (ref 5–17)
BUN SERPL-MCNC: 4 MG/DL — LOW (ref 7–23)
CALCIUM SERPL-MCNC: 8.2 MG/DL — LOW (ref 8.4–10.5)
CHLORIDE SERPL-SCNC: 98 MMOL/L — SIGNIFICANT CHANGE UP (ref 96–108)
CO2 SERPL-SCNC: 28 MMOL/L — SIGNIFICANT CHANGE UP (ref 22–31)
CREAT SERPL-MCNC: 0.55 MG/DL — SIGNIFICANT CHANGE UP (ref 0.5–1.3)
GLUCOSE BLDC GLUCOMTR-MCNC: 171 MG/DL — HIGH (ref 70–99)
GLUCOSE BLDC GLUCOMTR-MCNC: 172 MG/DL — HIGH (ref 70–99)
GLUCOSE BLDC GLUCOMTR-MCNC: 176 MG/DL — HIGH (ref 70–99)
GLUCOSE BLDC GLUCOMTR-MCNC: 218 MG/DL — HIGH (ref 70–99)
GLUCOSE SERPL-MCNC: 179 MG/DL — HIGH (ref 70–99)
HCT VFR BLD CALC: 32 % — LOW (ref 34.5–45)
HGB BLD-MCNC: 10.3 G/DL — LOW (ref 11.5–15.5)
MAGNESIUM SERPL-MCNC: 1.7 MG/DL — SIGNIFICANT CHANGE UP (ref 1.6–2.6)
MCHC RBC-ENTMCNC: 23.8 PG — LOW (ref 27–34)
MCHC RBC-ENTMCNC: 32.2 GM/DL — SIGNIFICANT CHANGE UP (ref 32–36)
MCV RBC AUTO: 73.9 FL — LOW (ref 80–100)
PHOSPHATE SERPL-MCNC: 2.9 MG/DL — SIGNIFICANT CHANGE UP (ref 2.5–4.5)
PLATELET # BLD AUTO: 231 K/UL — SIGNIFICANT CHANGE UP (ref 150–400)
POTASSIUM SERPL-MCNC: 4.1 MMOL/L — SIGNIFICANT CHANGE UP (ref 3.5–5.3)
POTASSIUM SERPL-SCNC: 4.1 MMOL/L — SIGNIFICANT CHANGE UP (ref 3.5–5.3)
RBC # BLD: 4.33 M/UL — SIGNIFICANT CHANGE UP (ref 3.8–5.2)
RBC # FLD: 15.7 % — HIGH (ref 10.3–14.5)
SODIUM SERPL-SCNC: 135 MMOL/L — SIGNIFICANT CHANGE UP (ref 135–145)
WBC # BLD: 5.98 K/UL — SIGNIFICANT CHANGE UP (ref 3.8–10.5)
WBC # FLD AUTO: 5.98 K/UL — SIGNIFICANT CHANGE UP (ref 3.8–10.5)

## 2018-07-30 PROCEDURE — 99232 SBSQ HOSP IP/OBS MODERATE 35: CPT

## 2018-07-30 RX ORDER — ACETAMINOPHEN 500 MG
1000 TABLET ORAL ONCE
Qty: 0 | Refills: 0 | Status: COMPLETED | OUTPATIENT
Start: 2018-07-30 | End: 2018-07-30

## 2018-07-30 RX ORDER — TETRACAINE/BENZOCAINE/BUTAMBEN 2%-14%-2%
1 OINTMENT (GRAM) TOPICAL
Qty: 0 | Refills: 0 | Status: DISCONTINUED | OUTPATIENT
Start: 2018-07-30 | End: 2018-08-08

## 2018-07-30 RX ADMIN — Medication 2: at 18:54

## 2018-07-30 RX ADMIN — MORPHINE SULFATE 30 MILLILITER(S): 50 CAPSULE, EXTENDED RELEASE ORAL at 03:18

## 2018-07-30 RX ADMIN — Medication 1 SPRAY(S): at 04:28

## 2018-07-30 RX ADMIN — ENOXAPARIN SODIUM 40 MILLIGRAM(S): 100 INJECTION SUBCUTANEOUS at 13:17

## 2018-07-30 RX ADMIN — Medication 400 MILLIGRAM(S): at 18:05

## 2018-07-30 RX ADMIN — Medication 3 MILLILITER(S): at 00:33

## 2018-07-30 RX ADMIN — PANTOPRAZOLE SODIUM 40 MILLIGRAM(S): 20 TABLET, DELAYED RELEASE ORAL at 13:17

## 2018-07-30 RX ADMIN — Medication 4: at 13:18

## 2018-07-30 RX ADMIN — Medication 3 MILLILITER(S): at 18:02

## 2018-07-30 RX ADMIN — Medication 2: at 06:54

## 2018-07-30 RX ADMIN — Medication 10 MILLIGRAM(S): at 15:43

## 2018-07-30 RX ADMIN — MORPHINE SULFATE 30 MILLILITER(S): 50 CAPSULE, EXTENDED RELEASE ORAL at 07:23

## 2018-07-30 RX ADMIN — Medication 10 MILLIGRAM(S): at 22:13

## 2018-07-30 RX ADMIN — MORPHINE SULFATE 30 MILLILITER(S): 50 CAPSULE, EXTENDED RELEASE ORAL at 22:19

## 2018-07-30 RX ADMIN — BUDESONIDE AND FORMOTEROL FUMARATE DIHYDRATE 2 PUFF(S): 160; 4.5 AEROSOL RESPIRATORY (INHALATION) at 18:02

## 2018-07-30 RX ADMIN — Medication 2: at 00:33

## 2018-07-30 RX ADMIN — Medication 0.25 MILLIGRAM(S): at 13:17

## 2018-07-30 RX ADMIN — BUDESONIDE AND FORMOTEROL FUMARATE DIHYDRATE 2 PUFF(S): 160; 4.5 AEROSOL RESPIRATORY (INHALATION) at 06:03

## 2018-07-30 RX ADMIN — Medication 3 MILLILITER(S): at 13:17

## 2018-07-30 RX ADMIN — TIOTROPIUM BROMIDE 1 CAPSULE(S): 18 CAPSULE ORAL; RESPIRATORY (INHALATION) at 13:16

## 2018-07-30 RX ADMIN — Medication 20 MILLIGRAM(S): at 06:03

## 2018-07-30 RX ADMIN — Medication 3 MILLILITER(S): at 06:03

## 2018-07-30 RX ADMIN — DEXTROSE MONOHYDRATE, SODIUM CHLORIDE, AND POTASSIUM CHLORIDE 100 MILLILITER(S): 50; .745; 4.5 INJECTION, SOLUTION INTRAVENOUS at 06:03

## 2018-07-30 NOTE — PROGRESS NOTE ADULT - SUBJECTIVE AND OBJECTIVE BOX
Day 3 of Anesthesia Pain Management Service    SUBJECTIVE: Patient is doing well with IV PCA    Pain Scale Score:	[X] Refer to charted pain scores    THERAPY:    [ ] IV PCA Morphine		[ ] 5 mg/mL	[ ] 1 mg/mL  [X] IV PCA Hydromorphone	[ ] 5 mg/mL	[X] 1 mg/mL  [ ] IV PCA Fentanyl		[ ] 50 micrograms/mL    Demand dose: 0.2 mg     Lockout: 6 minutes   Continuous Rate: 0 mg/hr  4 Hour Limit: 4 mg    MEDICATIONS  (STANDING):  ALBUTerol/ipratropium for Nebulization 3 milliLiter(s) Nebulizer every 6 hours  buDESOnide 160 MICROgram(s)/formoterol 4.5 MICROgram(s) Inhaler 2 Puff(s) Inhalation two times a day  dextrose 5% + sodium chloride 0.45% with potassium chloride 20 mEq/L 1000 milliLiter(s) (100 mL/Hr) IV Continuous <Continuous>  dextrose 5%. 1000 milliLiter(s) (50 mL/Hr) IV Continuous <Continuous>  dextrose 50% Injectable 12.5 Gram(s) IV Push once  dextrose 50% Injectable 25 Gram(s) IV Push once  dextrose 50% Injectable 25 Gram(s) IV Push once  digoxin  Injectable 0.25 milliGRAM(s) IV Push daily  enoxaparin Injectable 40 milliGRAM(s) SubCutaneous daily  hydrocortisone sodium succinate Injectable 20 milliGRAM(s) IV Push daily  insulin lispro (HumaLOG) corrective regimen sliding scale   SubCutaneous every 6 hours  morphine PCA (5 mG/mL) 30 milliLiter(s) PCA Continuous PCA Continuous  pantoprazole  Injectable 40 milliGRAM(s) IV Push daily  tiotropium 18 MICROgram(s) Capsule 1 Capsule(s) Inhalation daily    MEDICATIONS  (PRN):  ALBUTerol    90 MICROgram(s) HFA Inhaler 2 Puff(s) Inhalation every 6 hours PRN Shortness of Breath  dextrose 40% Gel 15 Gram(s) Oral once PRN Blood Glucose LESS THAN 70 milliGRAM(s)/deciliter  diphenhydrAMINE   Injectable 12.5 milliGRAM(s) IV Push every 4 hours PRN Itching  glucagon  Injectable 1 milliGRAM(s) IntraMuscular once PRN Glucose LESS THAN 70 milligrams/deciliter  metoclopramide Injectable 10 milliGRAM(s) IV Push every 6 hours PRN Nausea  morphine PCA (5 mG/mL) Rescue Clinician Bolus 2.5 milliGRAM(s) IV Push every 15 minutes PRN for Pain Scale GREATER THAN 6  naloxone Injectable 0.1 milliGRAM(s) IV Push every 3 minutes PRN For ANY of the following changes in patient status:  A. RR LESS THAN 10 breaths per minute, B. Oxygen saturation LESS THAN 90%, C. Sedation score of 6  tetracaine/benzocaine/butamben Spray 1 Spray(s) Topical four times a day PRN NGT irritation      OBJECTIVE:    Sedation Score:	[ X] Alert	[ ] Drowsy 	[ ] Arousable	[ ] Asleep	[ ] Unresponsive    Side Effects:	[X ] None	[ ] Nausea	[ ] Vomiting	[ ] Pruritus  		[ ] Other:    Vital Signs Last 24 Hrs  T(C): 37.5 (30 Jul 2018 06:43), Max: 38 (29 Jul 2018 17:41)  T(F): 99.5 (30 Jul 2018 06:43), Max: 100.4 (29 Jul 2018 17:41)  HR: 89 (30 Jul 2018 06:43) (76 - 96)  BP: 134/77 (30 Jul 2018 06:43) (101/64 - 136/80)  BP(mean): --  RR: 18 (30 Jul 2018 06:43) (18 - 18)  SpO2: 97% (30 Jul 2018 06:43) (91% - 99%)    ASSESSMENT/ PLAN    Therapy to  be:               [X] Continued   [ ] Discontinued   [ ] Changed to PRN Analgesics    Documentation and Verification of current medications:   [X] Done	[ ] Not done, not eligible    Comments: Using 1-2x/hr. Reeducated to use. Day 6 of Anesthesia Pain Management Service    SUBJECTIVE: Patient is doing well with IV PCA    Pain Scale Score:	[X] Refer to charted pain scores    THERAPY:    [X ] IV PCA Morphine		[X ] 5 mg/mL	[ ] 1 mg/mL  [  ] IV PCA Hydromorphone	[ ] 5 mg/mL	[  ] 1 mg/mL  [ ] IV PCA Fentanyl		[ ] 50 micrograms/mL    Demand dose: 1 mg     Lockout: 6 minutes   Continuous Rate: 0 mg/hr  4 Hour Limit: 12 mg    MEDICATIONS  (STANDING):  ALBUTerol/ipratropium for Nebulization 3 milliLiter(s) Nebulizer every 6 hours  buDESOnide 160 MICROgram(s)/formoterol 4.5 MICROgram(s) Inhaler 2 Puff(s) Inhalation two times a day  dextrose 5% + sodium chloride 0.45% with potassium chloride 20 mEq/L 1000 milliLiter(s) (100 mL/Hr) IV Continuous <Continuous>  dextrose 5%. 1000 milliLiter(s) (50 mL/Hr) IV Continuous <Continuous>  dextrose 50% Injectable 12.5 Gram(s) IV Push once  dextrose 50% Injectable 25 Gram(s) IV Push once  dextrose 50% Injectable 25 Gram(s) IV Push once  digoxin  Injectable 0.25 milliGRAM(s) IV Push daily  enoxaparin Injectable 40 milliGRAM(s) SubCutaneous daily  hydrocortisone sodium succinate Injectable 20 milliGRAM(s) IV Push daily  insulin lispro (HumaLOG) corrective regimen sliding scale   SubCutaneous every 6 hours  morphine PCA (5 mG/mL) 30 milliLiter(s) PCA Continuous PCA Continuous  pantoprazole  Injectable 40 milliGRAM(s) IV Push daily  tiotropium 18 MICROgram(s) Capsule 1 Capsule(s) Inhalation daily    MEDICATIONS  (PRN):  ALBUTerol    90 MICROgram(s) HFA Inhaler 2 Puff(s) Inhalation every 6 hours PRN Shortness of Breath  dextrose 40% Gel 15 Gram(s) Oral once PRN Blood Glucose LESS THAN 70 milliGRAM(s)/deciliter  diphenhydrAMINE   Injectable 12.5 milliGRAM(s) IV Push every 4 hours PRN Itching  glucagon  Injectable 1 milliGRAM(s) IntraMuscular once PRN Glucose LESS THAN 70 milligrams/deciliter  metoclopramide Injectable 10 milliGRAM(s) IV Push every 6 hours PRN Nausea  morphine PCA (5 mG/mL) Rescue Clinician Bolus 2.5 milliGRAM(s) IV Push every 15 minutes PRN for Pain Scale GREATER THAN 6  naloxone Injectable 0.1 milliGRAM(s) IV Push every 3 minutes PRN For ANY of the following changes in patient status:  A. RR LESS THAN 10 breaths per minute, B. Oxygen saturation LESS THAN 90%, C. Sedation score of 6  tetracaine/benzocaine/butamben Spray 1 Spray(s) Topical four times a day PRN NGT irritation      OBJECTIVE:    Sedation Score:	[ X] Alert	[ ] Drowsy 	[ ] Arousable	[ ] Asleep	[ ] Unresponsive    Side Effects:	[X ] None	[ ] Nausea	[ ] Vomiting	[ ] Pruritus  		[ ] Other:    Vital Signs Last 24 Hrs  T(C): 37.5 (30 Jul 2018 06:43), Max: 38 (29 Jul 2018 17:41)  T(F): 99.5 (30 Jul 2018 06:43), Max: 100.4 (29 Jul 2018 17:41)  HR: 89 (30 Jul 2018 06:43) (76 - 96)  BP: 134/77 (30 Jul 2018 06:43) (101/64 - 136/80)  BP(mean): --  RR: 18 (30 Jul 2018 06:43) (18 - 18)  SpO2: 97% (30 Jul 2018 06:43) (91% - 99%)    ASSESSMENT/ PLAN    Therapy to  be:               [X] Continued   [ ] Discontinued   [ ] Changed to PRN Analgesics    Documentation and Verification of current medications:   [X] Done	[ ] Not done, not eligible    Comments: Using 1-2x/hr. Reeducated to use.

## 2018-07-30 NOTE — PROGRESS NOTE ADULT - ATTENDING COMMENTS
as above-Stable at present--off abx--awaiting ostomy function  Pulm stable--TBM, asthma, chronic bronchitis-atelectasis due to pain  symbicort/spiriva/supplemental O2, medrol 4mg (currently IV -change to PO once able) -out pt xolair  acapella -incentive spirometry  pain control  ambulate--as per CRS--pain control and motility agents.    Eulalio Allison MD-Pulmonary   [phone]

## 2018-07-30 NOTE — PROGRESS NOTE ADULT - PROBLEM SELECTOR PLAN 5
s/p[ surgery--optimal pain control  f/up final pathology  off IV abx as per CRS-as of 7/27  NGT in place-await ostomy fxn

## 2018-07-30 NOTE — PROVIDER CONTACT NOTE (OTHER) - ACTION/TREATMENT ORDERED:
MD made aware; spoke with senior and ok to access mediport. Radha NO made aware of situation and will had oncology RN access port. Will continue to monitor.

## 2018-07-30 NOTE — PROGRESS NOTE ADULT - SUBJECTIVE AND OBJECTIVE BOX
GENERAL SURGERY DAILY PROGRESS NOTE:     Subjective: Patient seen and examined. Patient stable with no overnight events. Patient denies CP/ SOB/ Palpatations. Patient denies N/V. Reports No flatus and No BM.     MEDICATIONS  (STANDING):  ALBUTerol/ipratropium for Nebulization 3 milliLiter(s) Nebulizer every 6 hours  buDESOnide 160 MICROgram(s)/formoterol 4.5 MICROgram(s) Inhaler 2 Puff(s) Inhalation two times a day  dextrose 5% + sodium chloride 0.45% with potassium chloride 20 mEq/L 1000 milliLiter(s) (100 mL/Hr) IV Continuous <Continuous>  dextrose 5%. 1000 milliLiter(s) (50 mL/Hr) IV Continuous <Continuous>  dextrose 50% Injectable 12.5 Gram(s) IV Push once  dextrose 50% Injectable 25 Gram(s) IV Push once  dextrose 50% Injectable 25 Gram(s) IV Push once  digoxin  Injectable 0.25 milliGRAM(s) IV Push daily  enoxaparin Injectable 40 milliGRAM(s) SubCutaneous daily  hydrocortisone sodium succinate Injectable 20 milliGRAM(s) IV Push daily  insulin lispro (HumaLOG) corrective regimen sliding scale   SubCutaneous every 6 hours  morphine PCA (5 mG/mL) 30 milliLiter(s) PCA Continuous PCA Continuous  pantoprazole  Injectable 40 milliGRAM(s) IV Push daily  tiotropium 18 MICROgram(s) Capsule 1 Capsule(s) Inhalation daily    MEDICATIONS  (PRN):  ALBUTerol    90 MICROgram(s) HFA Inhaler 2 Puff(s) Inhalation every 6 hours PRN Shortness of Breath  dextrose 40% Gel 15 Gram(s) Oral once PRN Blood Glucose LESS THAN 70 milliGRAM(s)/deciliter  diphenhydrAMINE   Injectable 12.5 milliGRAM(s) IV Push every 4 hours PRN Itching  glucagon  Injectable 1 milliGRAM(s) IntraMuscular once PRN Glucose LESS THAN 70 milligrams/deciliter  metoclopramide Injectable 10 milliGRAM(s) IV Push every 6 hours PRN Nausea  morphine PCA (5 mG/mL) Rescue Clinician Bolus 2.5 milliGRAM(s) IV Push every 15 minutes PRN for Pain Scale GREATER THAN 6  naloxone Injectable 0.1 milliGRAM(s) IV Push every 3 minutes PRN For ANY of the following changes in patient status:  A. RR LESS THAN 10 breaths per minute, B. Oxygen saturation LESS THAN 90%, C. Sedation score of 6  tetracaine/benzocaine/butamben Spray 1 Spray(s) Topical four times a day PRN NGT irritation      Vital Signs Last 24 Hrs:   T(C): 37.5 (30 Jul 2018 06:43), Max: 38 (29 Jul 2018 17:41)  T(F): 99.5 (30 Jul 2018 06:43), Max: 100.4 (29 Jul 2018 17:41)  HR: 89 (30 Jul 2018 06:43) (76 - 96)  BP: 134/77 (30 Jul 2018 06:43) (101/64 - 136/80)  RR: 18 (30 Jul 2018 06:43) (18 - 18)  SpO2: 97% (30 Jul 2018 06:43) (91% - 99%)    I&O's Detail:   29 Jul 2018 07:01  -  30 Jul 2018 07:00  --------------------------------------------------------  IN:    dextrose 5% + sodium chloride 0.45% with potassium chloride 20 mEq/L: 2400 mL    Lactated Ringers IV Bolus: 1000 mL    Solution: 100 mL  Total IN: 3500 mL    OUT:    Indwelling Catheter - Urethral: 2100 mL    Nasoenteral Tube: 500 mL  Total OUT: 2600 mL  Total NET: 900 mL      LABS:                        10.3   4.9   )-----------( 194      ( 29 Jul 2018 22:57 )             33.9     07-29    137  |  98  |  5<L>  ----------------------------<  133<H>  4.3   |  28  |  0.61    Ca    8.0<L>      29 Jul 2018 19:35  Phos  2.9     07-29  Mg     1.8     07-29    TPro  5.7<L>  /  Alb  2.3<L>  /  TBili  0.4  /  DBili  x   /  AST  9<L>  /  ALT  6<L>  /  AlkPhos  67  07-29    Urinalysis Basic - ( 29 Jul 2018 19:35 )  Color: PL Yellow / Appearance: Clear / SG: <1.005 / pH: x  Gluc: x / Ketone: Negative  / Bili: Negative / Urobili: Negative   Blood: x / Protein: Negative / Nitrite: Negative   Leuk Esterase: Small / RBC: 0-2 /HPF / WBC 5-10 /HPF   Sq Epi: x / Non Sq Epi: x / Bacteria: Few /HPF      Physical Exam:   Gen: NAD, AAOx  Abdomen:   Incision:   Vac:     Assessment:    67y Female who presents with Malignant neoplasm of anal canal s/p Abdominoperineal proctectomy    Plan:  -DVT PPX  -Pain control   -OOB as tolerated with assistance   -Advance diet as tolerated  -Await Gi Denisha Valadez   #[id number] 07-30-18 @ 07:14 GENERAL SURGERY DAILY PROGRESS NOTE:     Subjective: Patient seen and examined. Patient stable with no overnight events. Patient denies CP/ SOB/ Palpitations. Patient denies N/V. No function noted in ostomy pouch. NPO/NGT.     MEDICATIONS  (STANDING):  ALBUTerol/ipratropium for Nebulization 3 milliLiter(s) Nebulizer every 6 hours  buDESOnide 160 MICROgram(s)/formoterol 4.5 MICROgram(s) Inhaler 2 Puff(s) Inhalation two times a day  dextrose 5% + sodium chloride 0.45% with potassium chloride 20 mEq/L 1000 milliLiter(s) (100 mL/Hr) IV Continuous <Continuous>  dextrose 5%. 1000 milliLiter(s) (50 mL/Hr) IV Continuous <Continuous>  dextrose 50% Injectable 12.5 Gram(s) IV Push once  dextrose 50% Injectable 25 Gram(s) IV Push once  dextrose 50% Injectable 25 Gram(s) IV Push once  digoxin  Injectable 0.25 milliGRAM(s) IV Push daily  enoxaparin Injectable 40 milliGRAM(s) SubCutaneous daily  hydrocortisone sodium succinate Injectable 20 milliGRAM(s) IV Push daily  insulin lispro (HumaLOG) corrective regimen sliding scale   SubCutaneous every 6 hours  morphine PCA (5 mG/mL) 30 milliLiter(s) PCA Continuous PCA Continuous  pantoprazole  Injectable 40 milliGRAM(s) IV Push daily  tiotropium 18 MICROgram(s) Capsule 1 Capsule(s) Inhalation daily    MEDICATIONS  (PRN):  ALBUTerol    90 MICROgram(s) HFA Inhaler 2 Puff(s) Inhalation every 6 hours PRN Shortness of Breath  dextrose 40% Gel 15 Gram(s) Oral once PRN Blood Glucose LESS THAN 70 milliGRAM(s)/deciliter  diphenhydrAMINE   Injectable 12.5 milliGRAM(s) IV Push every 4 hours PRN Itching  glucagon  Injectable 1 milliGRAM(s) IntraMuscular once PRN Glucose LESS THAN 70 milligrams/deciliter  metoclopramide Injectable 10 milliGRAM(s) IV Push every 6 hours PRN Nausea  morphine PCA (5 mG/mL) Rescue Clinician Bolus 2.5 milliGRAM(s) IV Push every 15 minutes PRN for Pain Scale GREATER THAN 6  naloxone Injectable 0.1 milliGRAM(s) IV Push every 3 minutes PRN For ANY of the following changes in patient status:  A. RR LESS THAN 10 breaths per minute, B. Oxygen saturation LESS THAN 90%, C. Sedation score of 6  tetracaine/benzocaine/butamben Spray 1 Spray(s) Topical four times a day PRN NGT irritation    Vital Signs Last 24 Hrs:   T(C): 37.5 (30 Jul 2018 06:43), Max: 38 (29 Jul 2018 17:41)  T(F): 99.5 (30 Jul 2018 06:43), Max: 100.4 (29 Jul 2018 17:41)  HR: 89 (30 Jul 2018 06:43) (76 - 96)  BP: 134/77 (30 Jul 2018 06:43) (101/64 - 136/80)  RR: 18 (30 Jul 2018 06:43) (18 - 18)  SpO2: 97% (30 Jul 2018 06:43) (91% - 99%)    I&O's Detail:   29 Jul 2018 07:01  -  30 Jul 2018 07:00  --------------------------------------------------------  IN:    dextrose 5% + sodium chloride 0.45% with potassium chloride 20 mEq/L: 2400 mL    Lactated Ringers IV Bolus: 1000 mL    Solution: 100 mL  Total IN: 3500 mL    OUT:    Indwelling Catheter - Urethral: 2100 mL    Nasoenteral Tube: 500 mL  Total OUT: 2600 mL  Total NET: 900 mL      LABS:                        10.3   4.9   )-----------( 194      ( 29 Jul 2018 22:57 )             33.9     07-29    137  |  98  |  5<L>  ----------------------------<  133<H>  4.3   |  28  |  0.61    Ca    8.0<L>      29 Jul 2018 19:35  Phos  2.9     07-29  Mg     1.8     07-29    TPro  5.7<L>  /  Alb  2.3<L>  /  TBili  0.4  /  DBili  x   /  AST  9<L>  /  ALT  6<L>  /  AlkPhos  67  07-29    Urinalysis Basic - ( 29 Jul 2018 19:35 )  Color: PL Yellow / Appearance: Clear / SG: <1.005 / pH: x  Gluc: x / Ketone: Negative  / Bili: Negative / Urobili: Negative   Blood: x / Protein: Negative / Nitrite: Negative   Leuk Esterase: Small / RBC: 0-2 /HPF / WBC 5-10 /HPF   Sq Epi: x / Non Sq Epi: x / Bacteria: Few /HPF      Physical Exam:   Gen: NAD, AAOx3  Abdomen: soft, NT, softly distended   Incision: clean/dry/ intact   Vac: clean/dry/intact   ostomy: serous fluid out of Penrose site    Assessment:    67y Female who presents with Malignant neoplasm of anal canal s/p Abdominoperineal proctectomy    Plan:  -DVT PPX  -Pain control   -OOB as tolerated with assistance   -Advance diet as tolerated  -Await Gi Denisha Valadez   #[id number] 07-30-18 @ 07:14

## 2018-07-30 NOTE — PROGRESS NOTE ADULT - ASSESSMENT
67 yr F with PMH of COPD/asthma, tracheobronchomalacia s/p tracheo-bronchoplasty in 10/16, Afib on Eliquis , Adrenal Insufficieny on steroids,  DM2, HTN, Squamous cell CA of the anus on chemo/XRT, now s/p elective abdominoperineal proctectomy for recurrent exophytic anal cancer on 7/24/18    1. Cough:  - Currently stable pulm status  -  Sputum Cx--pending  - In the past her SCxs/ BAL cxs have shown Grp B strept, Acinetobacter, Achromobacter, Pasteurella and Serratia  -  Off Antibiotic regimen of Aztreonam and Clindamycin as of 7/27   - On nebulized hypertonic saline Q12H (preceded by Duonebs) to assist with mobilization of secretions  - Cont close monitoring of resp symptoms. Optimize pain to prevent resp splinting  - Incentive spirometry/ acapella use/ OBC and early mobilization    2. COPD/ Asthma/ Tracheomalacia s/p tracheobronchoplasty   - ont Duonebs Q6H  - continue Symbicort BID (takes Breo ellipta at home which is non formularly)  - continue Spiriva daily  - Once tolerating PO intake, can restart home dose of Singulair daily  - Supplemental O2 to keep sats > 90 %    SEE below as well

## 2018-07-30 NOTE — PROGRESS NOTE ADULT - SUBJECTIVE AND OBJECTIVE BOX
Pain Management Attending Addendum    SUBJECTIVE:    Therapy:	  PCA	x   Epidural           s/p Spinal Opoid              Postpartum infusion	  Patient controlled regional anesthesia (PCRA)    prn Analgesics    OBJECTIVE: No new signs x     Other:    Side Effects:    Nonex			 Other:    Assessment of Catheter Site:		 Intact		 Other:    ASSESSMENT/PLAN  Continue current therapyx    Therapy changed to:     IV PCA        Epidural      prn Analgesics     post partum infusion    Comments:

## 2018-07-31 LAB
ANION GAP SERPL CALC-SCNC: 12 MMOL/L — SIGNIFICANT CHANGE UP (ref 5–17)
BUN SERPL-MCNC: 4 MG/DL — LOW (ref 7–23)
CALCIUM SERPL-MCNC: 8.2 MG/DL — LOW (ref 8.4–10.5)
CHLORIDE SERPL-SCNC: 96 MMOL/L — SIGNIFICANT CHANGE UP (ref 96–108)
CO2 SERPL-SCNC: 26 MMOL/L — SIGNIFICANT CHANGE UP (ref 22–31)
CREAT SERPL-MCNC: 0.58 MG/DL — SIGNIFICANT CHANGE UP (ref 0.5–1.3)
GLUCOSE BLDC GLUCOMTR-MCNC: 174 MG/DL — HIGH (ref 70–99)
GLUCOSE BLDC GLUCOMTR-MCNC: 183 MG/DL — HIGH (ref 70–99)
GLUCOSE BLDC GLUCOMTR-MCNC: 185 MG/DL — HIGH (ref 70–99)
GLUCOSE BLDC GLUCOMTR-MCNC: 238 MG/DL — HIGH (ref 70–99)
GLUCOSE SERPL-MCNC: 225 MG/DL — HIGH (ref 70–99)
HCT VFR BLD CALC: 32.6 % — LOW (ref 34.5–45)
HGB BLD-MCNC: 10.7 G/DL — LOW (ref 11.5–15.5)
MAGNESIUM SERPL-MCNC: 1.7 MG/DL — SIGNIFICANT CHANGE UP (ref 1.6–2.6)
MCHC RBC-ENTMCNC: 24.1 PG — LOW (ref 27–34)
MCHC RBC-ENTMCNC: 32.8 GM/DL — SIGNIFICANT CHANGE UP (ref 32–36)
MCV RBC AUTO: 73.4 FL — LOW (ref 80–100)
PHOSPHATE SERPL-MCNC: 2.3 MG/DL — LOW (ref 2.5–4.5)
PLATELET # BLD AUTO: 277 K/UL — SIGNIFICANT CHANGE UP (ref 150–400)
POTASSIUM SERPL-MCNC: 3.6 MMOL/L — SIGNIFICANT CHANGE UP (ref 3.5–5.3)
POTASSIUM SERPL-SCNC: 3.6 MMOL/L — SIGNIFICANT CHANGE UP (ref 3.5–5.3)
RBC # BLD: 4.44 M/UL — SIGNIFICANT CHANGE UP (ref 3.8–5.2)
RBC # FLD: 15.6 % — HIGH (ref 10.3–14.5)
SODIUM SERPL-SCNC: 134 MMOL/L — LOW (ref 135–145)
WBC # BLD: 10.22 K/UL — SIGNIFICANT CHANGE UP (ref 3.8–10.5)
WBC # FLD AUTO: 10.22 K/UL — SIGNIFICANT CHANGE UP (ref 3.8–10.5)

## 2018-07-31 PROCEDURE — 71045 X-RAY EXAM CHEST 1 VIEW: CPT | Mod: 26

## 2018-07-31 PROCEDURE — 99233 SBSQ HOSP IP/OBS HIGH 50: CPT

## 2018-07-31 PROCEDURE — 99232 SBSQ HOSP IP/OBS MODERATE 35: CPT

## 2018-07-31 RX ORDER — MORPHINE SULFATE 50 MG/1
2 CAPSULE, EXTENDED RELEASE ORAL EVERY 4 HOURS
Qty: 0 | Refills: 0 | Status: DISCONTINUED | OUTPATIENT
Start: 2018-07-31 | End: 2018-08-07

## 2018-07-31 RX ORDER — ACETAMINOPHEN 500 MG
1000 TABLET ORAL ONCE
Qty: 0 | Refills: 0 | Status: COMPLETED | OUTPATIENT
Start: 2018-07-31 | End: 2018-07-31

## 2018-07-31 RX ADMIN — MORPHINE SULFATE 2 MILLIGRAM(S): 50 CAPSULE, EXTENDED RELEASE ORAL at 19:19

## 2018-07-31 RX ADMIN — Medication 2: at 06:25

## 2018-07-31 RX ADMIN — MORPHINE SULFATE 2 MILLIGRAM(S): 50 CAPSULE, EXTENDED RELEASE ORAL at 19:39

## 2018-07-31 RX ADMIN — Medication 3 MILLILITER(S): at 17:31

## 2018-07-31 RX ADMIN — Medication 4: at 11:57

## 2018-07-31 RX ADMIN — Medication 3 MILLILITER(S): at 11:56

## 2018-07-31 RX ADMIN — Medication 20 MILLIGRAM(S): at 06:25

## 2018-07-31 RX ADMIN — DEXTROSE MONOHYDRATE, SODIUM CHLORIDE, AND POTASSIUM CHLORIDE 100 MILLILITER(S): 50; .745; 4.5 INJECTION, SOLUTION INTRAVENOUS at 06:25

## 2018-07-31 RX ADMIN — Medication 3 MILLILITER(S): at 06:25

## 2018-07-31 RX ADMIN — Medication 400 MILLIGRAM(S): at 22:20

## 2018-07-31 RX ADMIN — ENOXAPARIN SODIUM 40 MILLIGRAM(S): 100 INJECTION SUBCUTANEOUS at 11:56

## 2018-07-31 RX ADMIN — Medication 10 MILLIGRAM(S): at 22:57

## 2018-07-31 RX ADMIN — MORPHINE SULFATE 2 MILLIGRAM(S): 50 CAPSULE, EXTENDED RELEASE ORAL at 12:47

## 2018-07-31 RX ADMIN — Medication 2: at 00:43

## 2018-07-31 RX ADMIN — BUDESONIDE AND FORMOTEROL FUMARATE DIHYDRATE 2 PUFF(S): 160; 4.5 AEROSOL RESPIRATORY (INHALATION) at 17:31

## 2018-07-31 RX ADMIN — Medication 0.25 MILLIGRAM(S): at 11:57

## 2018-07-31 RX ADMIN — MORPHINE SULFATE 2 MILLIGRAM(S): 50 CAPSULE, EXTENDED RELEASE ORAL at 12:17

## 2018-07-31 RX ADMIN — TIOTROPIUM BROMIDE 1 CAPSULE(S): 18 CAPSULE ORAL; RESPIRATORY (INHALATION) at 11:56

## 2018-07-31 RX ADMIN — BUDESONIDE AND FORMOTEROL FUMARATE DIHYDRATE 2 PUFF(S): 160; 4.5 AEROSOL RESPIRATORY (INHALATION) at 06:25

## 2018-07-31 RX ADMIN — Medication 3 MILLILITER(S): at 23:18

## 2018-07-31 RX ADMIN — PANTOPRAZOLE SODIUM 40 MILLIGRAM(S): 20 TABLET, DELAYED RELEASE ORAL at 11:56

## 2018-07-31 RX ADMIN — MORPHINE SULFATE 30 MILLILITER(S): 50 CAPSULE, EXTENDED RELEASE ORAL at 02:14

## 2018-07-31 RX ADMIN — Medication 2: at 18:02

## 2018-07-31 RX ADMIN — Medication 3 MILLILITER(S): at 00:44

## 2018-07-31 RX ADMIN — MORPHINE SULFATE 30 MILLILITER(S): 50 CAPSULE, EXTENDED RELEASE ORAL at 07:25

## 2018-07-31 RX ADMIN — Medication 62.5 MILLIMOLE(S): at 11:57

## 2018-07-31 NOTE — PROGRESS NOTE ADULT - SUBJECTIVE AND OBJECTIVE BOX
Consult:  · Requested by Name:	Terrell Arango	  · Date/Time:	2018 	  · Reason for Referral/Consultation:	Perioperative management of cardiac conditions	      · Subjective and Objective: 	  **********              CARDIOLOGY CONSULT NOTE              ************  ============================================================  CHIEF COMPLAINT/REASON FOR CONSULT:  Patient is a 67y old  Female who presents with a chief complaint of rectal bleeding (2018 10:00)      HISTORY OF PRESENT ILLNESS:  67yFemale with a history of Remote Asthma, DVT, TIA, COPD, Moderate AI, Normal LVEF, pAF on Digoxin/Eliquis prior to admission, Colorectal CA p/w bleeding s/p SCC resection POD#1  - presenting for SCC resection.  No CP/SOB/Palps.         ============================================================  24 hr events  - BP controlled; HR controlled  - Continues on IV Dig  ============================================================      Allergies    ampicillin (Short breath)  aspirin (Short breath)  Avelox (Short breath; Pruritus)  codeine (Short breath)  Dilaudid (Short breath)  iodine (Short breath; Swelling)  penicillin (Short breath)  shellfish (Anaphylaxis)  tetanus toxoid (Short breath)  Valium (Short breath)    Intolerances    	    MEDICATIONS:  heparin  Injectable 5000 Unit(s) SubCutaneous every 8 hours    aztreonam  IVPB 2000 milliGRAM(s) IV Intermittent once  clindamycin IVPB 900 milliGRAM(s) IV Intermittent once    ALBUTerol    90 MICROgram(s) HFA Inhaler 2 Puff(s) Inhalation every 6 hours PRN  ALBUTerol/ipratropium for Nebulization 3 milliLiter(s) Nebulizer every 6 hours  diphenhydrAMINE   Injectable 12.5 milliGRAM(s) IV Push every 4 hours PRN  tiotropium 18 MICROgram(s) Capsule 1 Capsule(s) Inhalation daily    morphine PCA (5 mG/mL) 30 milliLiter(s) PCA Continuous PCA Continuous  morphine PCA (5 mG/mL) Rescue Clinician Bolus 2.5 milliGRAM(s) IV Push every 15 minutes PRN    pantoprazole  Injectable 40 milliGRAM(s) IV Push daily    dextrose 40% Gel 15 Gram(s) Oral once PRN  dextrose 50% Injectable 12.5 Gram(s) IV Push once  dextrose 50% Injectable 25 Gram(s) IV Push once  dextrose 50% Injectable 25 Gram(s) IV Push once  glucagon  Injectable 1 milliGRAM(s) IntraMuscular once PRN  insulin lispro (HumaLOG) corrective regimen sliding scale   SubCutaneous every 6 hours    dextrose 5% + sodium chloride 0.45%. 1000 milliLiter(s) IV Continuous <Continuous>  dextrose 5%. 1000 milliLiter(s) IV Continuous <Continuous>      PAST MEDICAL & SURGICAL HISTORY:  TIA (transient ischemic attack): multiple, last 5 years ago - presents as right-sided weakness  DVT (deep venous thrombosis): 15-20 years ago, took coumadin  Seizure: x 1 18  Rectal bleeding  Colorectal cancer: 2018- last treatment , chemo and radiation  Tracheobronchomalacia: diagnosed , s/p bronchial thermoplasty  (Dr Zapien); recent bronchoscopy 2018 revealed no evidence of tracheobronchomalacia in trachea or bronchial tubes  Asthma  Pelvic fracture  Aortic insufficiency: moderate AR on echo 5/3/2018  Adrenal insufficiency: Medrol daily for over 50 years  COPD (chronic obstructive pulmonary disease)  Diabetes: Type 2  Atrial fibrillation: paroxysmal, on eliquis  Rectal bleeding: exam under anesthesia ([facility]) 2018  S/P bronchoscopy: 2018 - [facility] (Dr Zapien) no evidence of tracheobronchomalacia in trachea or bronchial tubes  History of tracheomalacia:  - attempted tracheal stenting ([facility])- course complicated by obstruction, respiratory failure, multiple CPR attempts -  stent discontinued; 10/20/2016 Tracheobronchoplasty (Prolene Mesh) performed at [facility] by Dr Zapien  H/O pelvic surgery: 5 years ago - s/p fracture  Exostosis of orbit, 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  History of sinus surgery: multiple sinus surgeries  H/O total knee replacement, bilateral: 5 years ago  History of partial hysterectomy: 30 years ago - fibroids      FAMILY HISTORY:  Family history of diabetes mellitus type II  Family history of breast cancer (Sibling)  Family history of asthma    NC -   Mother - HTN  Father - DM    SOCIAL HISTORY:    [ x] Non-smoker  [x] No Illicit Drug Use  [ x] No Excess EtOH Use      REVIEW OF SYSTEMS: (Unless + Before Symptom, it is negative)  Constitutional: No Fever, Fatigue, Weight Changes  Eyes: No Recent Vision Changes, Eye Pain  ENT: No Congestion, Sore Throat  Endocrine: No Excess Sweating, Temperature Intolerance  Cardiovascular: No Chest Pain, Palpitations, Shortness of Breath, Pre-syncope, Syncope, LE Edema  Respiratory: No Cough, Congestion, Wheezing  Gastrointestinal: +Abdominal Pain, Nausea, Vomiting  Genitourinary: No dysuria, hematuria  Musculoskeletal: No Joint Pain, Swelling  Neurologic: No headaches, Imbalance, Weakness  Skin: No rashes, hematoma, purprura    ================================    PHYSICAL EXAM:  T(C): 36.7 (18 @ 14:29), Max: 37.1 (18 @ 07:35)  HR: 77 (18 @ 14:29) (61 - 86)  BP: 120/65 (18 @ 14:29) (91/54 - 132/59)  RR: 18 (18 @ 14:29) (15 - 18)  SpO2: 99% (18 @ 14:29) (95% - 100%)  Wt(kg): --  I&O's Summary    2018 07:  -  2018 07:00  --------------------------------------------------------  IN: 2200 mL / OUT: 1200 mL / NET: 1000 mL    2018 07:  -  2018 15:18  --------------------------------------------------------  IN: 0 mL / OUT: 1050 mL / NET: -1050 mL      Appearance: Normal; NAD	  HEENT:   Normal oral mucosa, EOMI	  Lymphatic: No lymphadenopathy  Cardiovascular: Normal S1 S2, No JVD, No murmurs, No edema  Respiratory: Lungs clear to auscultation, no use of accessory muscles	  Psychiatry: A & O x 3, Mood & affect appropriate  Gastrointestinal:  Soft, +Distended +Less Tender +Ostomy  Skin: No rashes, No ecchymoses, No cyanosis	  Neurologic: Non-focal, No Focal Deficits  Extremities: Normal range of motion, No clubbing, cyanosis or edema  Vascular: Peripheral pulses palpable 2+ bilaterally, no prominent varicosities    ============================    LABS:	   Labs     CBC Full  -  ( 2018 04:32 )  WBC Count : 14.8 K/uL  Hemoglobin : 11.4 g/dL  Hematocrit : 37.5 %  Platelet Count - Automated : 201 K/uL  Mean Cell Volume : 76.1 fl  Mean Cell Hemoglobin : 23.1 pg  Mean Cell Hemoglobin Concentration : 30.3 gm/dL  Auto Neutrophil # : x  Auto Lymphocyte # : x  Auto Monocyte # : x  Auto Eosinophil # : x  Auto Basophil # : x  Auto Neutrophil % : x  Auto Lymphocyte % : x  Auto Monocyte % : x  Auto Eosinophil % : x  Auto Basophil % : x        142  |  106  |  10  ----------------------------<  191<H>  4.6   |  26  |  0.77      140  |  104  |  9   ----------------------------<  201<H>  4.4   |  25  |  0.73    Ca    8.2<L>      2018 04:32  Ca    8.2<L>      2018 00:16  Phos  4.2       Phos  4.1     -  Mg     2.1     -25  Mg     2.0               =========================================================================  CXR:  < from: Xray Chest 1 View- PORTABLE-Urgent (18 @ 16:02) >    EXAM:  XR CHEST PORTABLE URGENT 1V                          PROCEDURE DATE:  2018          INTERPRETATION:  Portable Chest X-Ray dated 2018 4:02 PM    Indication: s/p bronch    Prior studies: 2018    An AP portable view of the chest revealed cardiomegaly. Trachea midline.   No pneumothorax seen. Trace right pleural effusion. Increased markings in   the left lower lobe. Right Port-A-Cath with the tip in the right atrium.    IMPRESSION:  No pneumothorax seen. Trace right pleural effusion.             "Thank you for the opportunity to participate in the care of this   patient."    < end of copied text >      ECG:  	    PREVIOUS DIAGNOSTIC TESTING:    [X] Echocardiogram:  < from: Echocardiogram (18 @ 10:55) >      INTERPRETATION:  Patient Height: 170.2 cm  Patient Weight: 73.0 kg  Heart Rate: 70 bpm  Systolic Pressure: 132 mmHg  Diastolic Pressure: 70 mmHg  BSA: 1.8 m^2  Interpretation Summary  The left atrial size is normal. Right atrial size is normal.There is mild   aortic valve thickening. The aortic valve is trileaflet. There is   moderate   aortic regurgitation. No hemodynamically significantvalvular aortic   stenosis.    There is mild mitral valve thickening. There is mild mitral annular   calcification. There is trace mitral regurgitation.  Structurally normal   tricuspid valve. There is trace tricuspid regurgitation.There was   insufficient   TR detected from which to calculate pulmonary artery systolic pressure.    The   pulmonic valve is not well visualized. No pulmonic regurgitation   noted.The   right ventricle is normal in size and function.There is moderate   concentric   left ventricular hypertrophy. The left ventricular wall motion is   normal. The   left ventricular ejection fraction is 63%.  No aortic root   dilatation.There is   no pericardial effusion.When compared to prior study performed on   10/21/16,   there isno significant change.  Procedure Details  A complete two-dimensional transthoracic echocardiogram was performed (2D,  M-mode, spectral and color flow doppler).  Study Quality: Fair.  Left Ventricle  There is moderate concentric left ventricular hypertrophy.  The left ventricular wall motion is normal.  The left ventricular ejection fraction is 63%.  Left Atrium  The left atrial size is normal.  Right Atrium  Right atrial size is normal.  Right Ventricle  The right ventricle is normal in size andfunction.  Aortic Valve  There is mild aortic valve thickening.  The aortic valve is trileaflet.  There is moderate aortic regurgitation.  No hemodynamically significant valvular aortic stenosis.  Mitral Valve  There is mild mitral valve thickening.  There is mild mitral annular calcification.  There is trace mitral regurgitation.  Tricuspid Valve  Structurally normal tricuspid valve.  There was insufficient TR detected from which to calculate pulmonary   artery  systolic pressure.  There is trace tricuspid regurgitation.  Pulmonic Valve  The pulmonic valve is not well visualized.  No pulmonic regurgitation noted.  Arteries and Venous System  No aortic root dilatation.  The inferior vena cava is normal in size (<2.1 cm) with normal inspiratory  collapse (>50%) consistent with normal right atrial pressure.  Pericardium / Pleura  There is no pericardial effusion.  Doppler Measurements & Calculations  MV E point: 67.9 cm/sec  MV A point: 91.3 cm/sec  MV E/A: 0.7  MV dec time: 0.2 sec  Ao V2 max: 172.4 cm/sec  Ao max P.9 mmHg  Ao max PG (full): 5.5 mmHg  SUSANA(V,A): 2.4 cm^2  SUSANA(V,D): 2.4 cm^2  AI max lynette: 399.1 cm/sec  AI max P.7 mmHg  AI dec slope: 275.9 cm/sec^2  AI P1/2t: 423.7 msec  LV max P.4 mmHg  LV V1 max: 126.5 cm/sec  MMode 2D Measurements & Calculations  IVSd: 1.3 cm  LVIDd: 4.2 cm  LVIDs: 2.9 cm  LVPWd: 1.1 cm  IVS/LVPW: 1.2  FS: 31.3 %  EDV(Teich): 80.1 ml  ESV(Teich): 32.5 ml  LV mass(C)d: 180.5 grams  LV mass(C)dI: 97.9 grams/m^2  SI(cubed): 27.8 ml/m^2  Ao root diam: 3.3 cm  Ao root area: 8.8 cm^2  LA dimension: 4.6 cm  LA/Ao: 1.4  LVOT diam: 2.0 cm  LVOT area: 3.2 cm^2  LVOT area (M): 3.8 cm^2  LVLd ap4: 7.6 cm  EDV(MOD-sp4): 70 ml  LVLs ap4: 6.6 cm  ESV(MOD-sp4): 22 ml  EF(MOD-sp4): 68.6 %  LVLd ap2: 7.4 cm  EDV(MOD-sp2): 43 ml  LVLs ap2: 5.9 cm  ESV(MOD-sp2): 16 ml  EF(MOD-sp2): 62.8 %  SV(MOD-sp4): 48 ml  SI(MOD-sp4): 26.0 ml/m^2  SV(MOD-sp2): 27 ml  SI(MOD-sp2): 14.6 ml/m^2  Interpreting Physician:OMAR Dias electronically signed on   2018 12:45:03        =========================================================================  ASSESSMENT:    67yFemale with a history of Remote Asthma, DVT, TIA, COPD, Moderate AI, Normal LVEF, pAF on Digoxin/Eliquis prior to admission, Colorectal CA p/w bleeding s/p SCC resection POD#1  - presenting for SCC resection.    Recs  - Resume Eliquis when able  - Continue Digoxin  - Hold Diovan  - Careful with fluid load given AI  - Thank you for this consult.  - Will Follow      Please call with questions.     Baron Tristan MD, [facility]  [phone]

## 2018-07-31 NOTE — PROGRESS NOTE ADULT - SUBJECTIVE AND OBJECTIVE BOX
GENERAL SURGERY DAILY PROGRESS NOTE:     Subjective: Patient seen and examined. Patient stable with no overnight events. Patient denies CP/ SOB/ Palpitations.  Patient denies N/V. Reports air and stool in the pouch.     MEDICATIONS  (STANDING):  ALBUTerol/ipratropium for Nebulization 3 milliLiter(s) Nebulizer every 6 hours  buDESOnide 160 MICROgram(s)/formoterol 4.5 MICROgram(s) Inhaler 2 Puff(s) Inhalation two times a day  dextrose 5% + sodium chloride 0.45% with potassium chloride 20 mEq/L 1000 milliLiter(s) (100 mL/Hr) IV Continuous <Continuous>  dextrose 5%. 1000 milliLiter(s) (50 mL/Hr) IV Continuous <Continuous>  dextrose 50% Injectable 12.5 Gram(s) IV Push once  dextrose 50% Injectable 25 Gram(s) IV Push once  dextrose 50% Injectable 25 Gram(s) IV Push once  digoxin  Injectable 0.25 milliGRAM(s) IV Push daily  enoxaparin Injectable 40 milliGRAM(s) SubCutaneous daily  hydrocortisone sodium succinate Injectable 20 milliGRAM(s) IV Push daily  insulin lispro (HumaLOG) corrective regimen sliding scale   SubCutaneous every 6 hours  morphine PCA (5 mG/mL) 30 milliLiter(s) PCA Continuous PCA Continuous  pantoprazole  Injectable 40 milliGRAM(s) IV Push daily  tiotropium 18 MICROgram(s) Capsule 1 Capsule(s) Inhalation daily    MEDICATIONS  (PRN):  ALBUTerol    90 MICROgram(s) HFA Inhaler 2 Puff(s) Inhalation every 6 hours PRN Shortness of Breath  dextrose 40% Gel 15 Gram(s) Oral once PRN Blood Glucose LESS THAN 70 milliGRAM(s)/deciliter  diphenhydrAMINE   Injectable 12.5 milliGRAM(s) IV Push every 4 hours PRN Itching  glucagon  Injectable 1 milliGRAM(s) IntraMuscular once PRN Glucose LESS THAN 70 milligrams/deciliter  metoclopramide Injectable 10 milliGRAM(s) IV Push every 6 hours PRN Nausea  morphine PCA (5 mG/mL) Rescue Clinician Bolus 2.5 milliGRAM(s) IV Push every 15 minutes PRN for Pain Scale GREATER THAN 6  naloxone Injectable 0.1 milliGRAM(s) IV Push every 3 minutes PRN For ANY of the following changes in patient status:  A. RR LESS THAN 10 breaths per minute, B. Oxygen saturation LESS THAN 90%, C. Sedation score of 6  tetracaine/benzocaine/butamben Spray 1 Spray(s) Topical four times a day PRN NGT irritation      Vital Signs Last 24 Hrs  T(C): 37.3 (31 Jul 2018 04:45), Max: 39.3 (30 Jul 2018 17:17)  T(F): 99.2 (31 Jul 2018 04:45), Max: 102.7 (30 Jul 2018 17:17)  HR: 93 (31 Jul 2018 04:45) (91 - 111)  BP: 119/71 (31 Jul 2018 04:45) (110/68 - 145/78)  BP(mean): --  RR: 18 (31 Jul 2018 04:45) (18 - 18)  SpO2: 93% (31 Jul 2018 04:45) (93% - 98%)    I&O's Detail    30 Jul 2018 07:01  -  31 Jul 2018 07:00  --------------------------------------------------------  IN:    dextrose 5% + sodium chloride 0.45% with potassium chloride 20 mEq/L: 2015 mL    Solution: 100 mL  Total IN: 2115 mL    OUT:    Colostomy: 50 mL    Nasoenteral Tube: 350 mL    VAC (Vacuum Assisted Closure) System: 300 mL    Voided: 800 mL  Total OUT: 1500 mL    Total NET: 615 mL          Daily     Daily     LABS:                        10.3   5.98  )-----------( 231      ( 30 Jul 2018 08:06 )             32.0     07-30    135  |  98  |  4<L>  ----------------------------<  179<H>  4.1   |  28  |  0.55    Ca    8.2<L>      30 Jul 2018 07:15  Phos  2.9     07-30  Mg     1.7     07-30    TPro  5.7<L>  /  Alb  2.3<L>  /  TBili  0.4  /  DBili  x   /  AST  9<L>  /  ALT  6<L>  /  AlkPhos  67  07-29      Urinalysis Basic - ( 29 Jul 2018 19:35 )    Color: PL Yellow / Appearance: Clear / SG: <1.005 / pH: x  Gluc: x / Ketone: Negative  / Bili: Negative / Urobili: Negative   Blood: x / Protein: Negative / Nitrite: Negative   Leuk Esterase: Small / RBC: 0-2 /HPF / WBC 5-10 /HPF   Sq Epi: x / Non Sq Epi: x / Bacteria: Few /HPF        Physical Exam:   Gen: NAD, AAO  Abdomen: soft, NT, ND  Incision: clean/dry/intact     Assessment:    67y Female who presents with Malignant neoplasm of anal canal    Plan:  -DVT PPX  -Pain control   -OOB as tolerated with assistance   -Advance diet as tolerated  -Await Gi Fxn   -Dispo Planning     Marry Valadez   #[id number] 07-31-18 @ 07:02

## 2018-07-31 NOTE — PROGRESS NOTE ADULT - ATTENDING COMMENTS
as above-Stable at present--off abx--awaiting ostomy function  Pulm stable--TBM, asthma, chronic bronchitis-atelectasis due to pain  symbicort/spiriva/supplemental O2, medrol 4mg (currently IV -change to PO once able) -out pt xolair  acapella -incentive spirometry  pain control  ambulate--as per CRS--pain control and motility agents.   Rehab pending    Eulalio Allison MD-Pulmonary   [phone]

## 2018-07-31 NOTE — PROGRESS NOTE ADULT - SUBJECTIVE AND OBJECTIVE BOX
Day 7 of Anesthesia Pain Management Service    SUBJECTIVE: Patient is doing well with IV PCA    Pain Scale Score:	[X] Refer to charted pain scores    THERAPY:    [ X] IV PCA Morphine		[ X] 5 mg/mL	[   ] 1 mg/mL  [  ] IV PCA Hydromorphone	[ ] 5 mg/mL	[   ] 1 mg/mL  [ ] IV PCA Fentanyl		[ ] 50 micrograms/mL    Demand dose: 1 mg     Lockout: 6 minutes   Continuous Rate: 0 mg/hr  4 Hour Limit: 12 mg    MEDICATIONS  (STANDING):  ALBUTerol/ipratropium for Nebulization 3 milliLiter(s) Nebulizer every 6 hours  buDESOnide 160 MICROgram(s)/formoterol 4.5 MICROgram(s) Inhaler 2 Puff(s) Inhalation two times a day  dextrose 5% + sodium chloride 0.45% with potassium chloride 20 mEq/L 1000 milliLiter(s) (100 mL/Hr) IV Continuous <Continuous>  dextrose 5%. 1000 milliLiter(s) (50 mL/Hr) IV Continuous <Continuous>  dextrose 50% Injectable 12.5 Gram(s) IV Push once  dextrose 50% Injectable 25 Gram(s) IV Push once  dextrose 50% Injectable 25 Gram(s) IV Push once  digoxin  Injectable 0.25 milliGRAM(s) IV Push daily  enoxaparin Injectable 40 milliGRAM(s) SubCutaneous daily  hydrocortisone sodium succinate Injectable 20 milliGRAM(s) IV Push daily  insulin lispro (HumaLOG) corrective regimen sliding scale   SubCutaneous every 6 hours  morphine PCA (5 mG/mL) 30 milliLiter(s) PCA Continuous PCA Continuous  pantoprazole  Injectable 40 milliGRAM(s) IV Push daily  tiotropium 18 MICROgram(s) Capsule 1 Capsule(s) Inhalation daily    MEDICATIONS  (PRN):  ALBUTerol    90 MICROgram(s) HFA Inhaler 2 Puff(s) Inhalation every 6 hours PRN Shortness of Breath  dextrose 40% Gel 15 Gram(s) Oral once PRN Blood Glucose LESS THAN 70 milliGRAM(s)/deciliter  diphenhydrAMINE   Injectable 12.5 milliGRAM(s) IV Push every 4 hours PRN Itching  glucagon  Injectable 1 milliGRAM(s) IntraMuscular once PRN Glucose LESS THAN 70 milligrams/deciliter  metoclopramide Injectable 10 milliGRAM(s) IV Push every 6 hours PRN Nausea  morphine PCA (5 mG/mL) Rescue Clinician Bolus 2.5 milliGRAM(s) IV Push every 15 minutes PRN for Pain Scale GREATER THAN 6  naloxone Injectable 0.1 milliGRAM(s) IV Push every 3 minutes PRN For ANY of the following changes in patient status:  A. RR LESS THAN 10 breaths per minute, B. Oxygen saturation LESS THAN 90%, C. Sedation score of 6  tetracaine/benzocaine/butamben Spray 1 Spray(s) Topical four times a day PRN NGT irritation      OBJECTIVE:    Sedation Score:	[ X] Alert	[ ] Drowsy 	[ ] Arousable	[ ] Asleep	[ ] Unresponsive    Side Effects:	[X ] None	[ ] Nausea	[ ] Vomiting	[ ] Pruritus  		[ ] Other:    Vital Signs Last 24 Hrs  T(C): 37.3 (31 Jul 2018 04:45), Max: 39.3 (30 Jul 2018 17:17)  T(F): 99.2 (31 Jul 2018 04:45), Max: 102.7 (30 Jul 2018 17:17)  HR: 93 (31 Jul 2018 04:45) (93 - 111)  BP: 119/71 (31 Jul 2018 04:45) (110/68 - 145/78)  BP(mean): --  RR: 18 (31 Jul 2018 04:45) (18 - 18)  SpO2: 93% (31 Jul 2018 04:45) (93% - 98%)    ASSESSMENT/ PLAN    Therapy to  be:               [  ] Continued   [x ] Discontinued   [ x] Changed to PRN Analgesics    Documentation and Verification of current medications:   [X] Done	[ ] Not done, not eligible    Comments: D\C PCA and change to PRN analgesics. +NGT

## 2018-07-31 NOTE — PROVIDER CONTACT NOTE (OTHER) - ACTION/TREATMENT ORDERED:
MD notified and ordered IV tylenol 1g, CXR, Blood cultures, UA. Will administer and continue to monitor.

## 2018-07-31 NOTE — PROGRESS NOTE ADULT - SUBJECTIVE AND OBJECTIVE BOX
CHIEF COMPLAINT: f/up for asthma, bronchiectasis, chronic bronchitis, TBM--some bloating present-some mucus-no signif sob-especially w/ pain    Interval Events: ngt in place; considering rehab    REVIEW OF SYSTEMS:  Constitutional: No fevers or chills. No weight loss. + fatigue or generalized malaise.  Eyes: No itching or discharge from the eyes  ENT: No ear pain. No ear discharge. No nasal congestion. No post nasal drip. No epistaxis. No throat pain. No sore throat. No difficulty swallowing.   CV: No chest pain. No palpitations. No lightheadedness or dizziness.   Resp: No dyspnea at rest. + dyspnea on exertion. No orthopnea. No wheezing. + cough. No stridor. No sputum production. No chest pain with respiration.  GI: No nausea. No vomiting. No diarrhea.  MSK: No joint pain or pain in any extremities  Integumentary: No skin lesions. No pedal edema.  Neurological: No gross motor weakness. No sensory changes.  [+ ] All other systems negative  [ ] Unable to assess ROS because ________    OBJECTIVE:  ICU Vital Signs Last 24 Hrs  T(C): 37.3 (31 Jul 2018 04:45), Max: 39.3 (30 Jul 2018 17:17)  T(F): 99.2 (31 Jul 2018 04:45), Max: 102.7 (30 Jul 2018 17:17)  HR: 93 (31 Jul 2018 04:45) (89 - 111)  BP: 119/71 (31 Jul 2018 04:45) (110/68 - 145/78)  BP(mean): --  ABP: --  ABP(mean): --  RR: 18 (31 Jul 2018 04:45) (18 - 18)  SpO2: 93% (31 Jul 2018 04:45) (93% - 98%)        07-29 @ 07:01  -  07-30 @ 07:00  --------------------------------------------------------  IN: 3500 mL / OUT: 2600 mL / NET: 900 mL    07-30 @ 07:01  -  07-31 @ 05:07  --------------------------------------------------------  IN: 1300 mL / OUT: 900 mL / NET: 400 mL      CAPILLARY BLOOD GLUCOSE      POCT Blood Glucose.: 174 mg/dL (31 Jul 2018 00:23)      PHYSICAL EXAM: NAD in bed w/ NGT in place  General: Awake, alert, oriented X 3.   HEENT: Atraumatic, normocephalic.                 Mallampatti Grade                 No nasal congestion.                No tonsillar or pharyngeal exudates.  Lymph Nodes: No palpable lymphadenopathy  Neck: No JVD. No carotid bruit.   Respiratory: Normal chest expansion                         Normal percussion                         Normal and equal air entry                         No wheeze, rhonchi or rales.  Cardiovascular: S1 S2 normal. No murmurs, rubs or gallops.   Abdomen: Soft, non-tender, non-distended. No organomegaly. reduced bs  Extremities: Warm to touch. Peripheral pulse palpable. No pedal edema.   Skin: No rashes or skin lesions  Neurological: Motor and sensory examination equal and normal in all four extremities.  Psychiatry: Appropriate mood and affect.    HOSPITAL MEDICATIONS:  MEDICATIONS  (STANDING):  ALBUTerol/ipratropium for Nebulization 3 milliLiter(s) Nebulizer every 6 hours  buDESOnide 160 MICROgram(s)/formoterol 4.5 MICROgram(s) Inhaler 2 Puff(s) Inhalation two times a day  dextrose 5% + sodium chloride 0.45% with potassium chloride 20 mEq/L 1000 milliLiter(s) (100 mL/Hr) IV Continuous <Continuous>  dextrose 5%. 1000 milliLiter(s) (50 mL/Hr) IV Continuous <Continuous>  dextrose 50% Injectable 12.5 Gram(s) IV Push once  dextrose 50% Injectable 25 Gram(s) IV Push once  dextrose 50% Injectable 25 Gram(s) IV Push once  digoxin  Injectable 0.25 milliGRAM(s) IV Push daily  enoxaparin Injectable 40 milliGRAM(s) SubCutaneous daily  hydrocortisone sodium succinate Injectable 20 milliGRAM(s) IV Push daily  insulin lispro (HumaLOG) corrective regimen sliding scale   SubCutaneous every 6 hours  morphine PCA (5 mG/mL) 30 milliLiter(s) PCA Continuous PCA Continuous  pantoprazole  Injectable 40 milliGRAM(s) IV Push daily  tiotropium 18 MICROgram(s) Capsule 1 Capsule(s) Inhalation daily    MEDICATIONS  (PRN):  ALBUTerol    90 MICROgram(s) HFA Inhaler 2 Puff(s) Inhalation every 6 hours PRN Shortness of Breath  dextrose 40% Gel 15 Gram(s) Oral once PRN Blood Glucose LESS THAN 70 milliGRAM(s)/deciliter  diphenhydrAMINE   Injectable 12.5 milliGRAM(s) IV Push every 4 hours PRN Itching  glucagon  Injectable 1 milliGRAM(s) IntraMuscular once PRN Glucose LESS THAN 70 milligrams/deciliter  metoclopramide Injectable 10 milliGRAM(s) IV Push every 6 hours PRN Nausea  morphine PCA (5 mG/mL) Rescue Clinician Bolus 2.5 milliGRAM(s) IV Push every 15 minutes PRN for Pain Scale GREATER THAN 6  naloxone Injectable 0.1 milliGRAM(s) IV Push every 3 minutes PRN For ANY of the following changes in patient status:  A. RR LESS THAN 10 breaths per minute, B. Oxygen saturation LESS THAN 90%, C. Sedation score of 6  tetracaine/benzocaine/butamben Spray 1 Spray(s) Topical four times a day PRN NGT irritation      LABS:                        10.3   5.98  )-----------( 231      ( 30 Jul 2018 08:06 )             32.0     07-30    135  |  98  |  4<L>  ----------------------------<  179<H>  4.1   |  28  |  0.55    Ca    8.2<L>      30 Jul 2018 07:15  Phos  2.9     07-30  Mg     1.7     07-30    TPro  5.7<L>  /  Alb  2.3<L>  /  TBili  0.4  /  DBili  x   /  AST  9<L>  /  ALT  6<L>  /  AlkPhos  67  07-29      Urinalysis Basic - ( 29 Jul 2018 19:35 )    Color: PL Yellow / Appearance: Clear / SG: <1.005 / pH: x  Gluc: x / Ketone: Negative  / Bili: Negative / Urobili: Negative   Blood: x / Protein: Negative / Nitrite: Negative   Leuk Esterase: Small / RBC: 0-2 /HPF / WBC 5-10 /HPF   Sq Epi: x / Non Sq Epi: x / Bacteria: Few /HPF            MICROBIOLOGY:     RADIOLOGY:  [ ] Reviewed and interpreted by me    Point of Care Ultrasound Findings:    PFT:    EKG:

## 2018-08-01 DIAGNOSIS — R50.9 FEVER, UNSPECIFIED: ICD-10-CM

## 2018-08-01 LAB
ANION GAP SERPL CALC-SCNC: 13 MMOL/L — SIGNIFICANT CHANGE UP (ref 5–17)
APPEARANCE UR: ABNORMAL
APPEARANCE UR: CLEAR — SIGNIFICANT CHANGE UP
BILIRUB UR-MCNC: NEGATIVE — SIGNIFICANT CHANGE UP
BILIRUB UR-MCNC: NEGATIVE — SIGNIFICANT CHANGE UP
BUN SERPL-MCNC: <4 MG/DL — LOW (ref 7–23)
CALCIUM SERPL-MCNC: 8 MG/DL — LOW (ref 8.4–10.5)
CHLORIDE SERPL-SCNC: 94 MMOL/L — LOW (ref 96–108)
CO2 SERPL-SCNC: 27 MMOL/L — SIGNIFICANT CHANGE UP (ref 22–31)
COLOR SPEC: YELLOW — SIGNIFICANT CHANGE UP
COLOR SPEC: YELLOW — SIGNIFICANT CHANGE UP
CREAT SERPL-MCNC: 0.55 MG/DL — SIGNIFICANT CHANGE UP (ref 0.5–1.3)
DIFF PNL FLD: ABNORMAL
DIFF PNL FLD: NEGATIVE — SIGNIFICANT CHANGE UP
DIGOXIN SERPL-MCNC: 0.7 NG/ML — LOW (ref 0.8–2)
GLUCOSE BLDC GLUCOMTR-MCNC: 131 MG/DL — HIGH (ref 70–99)
GLUCOSE BLDC GLUCOMTR-MCNC: 179 MG/DL — HIGH (ref 70–99)
GLUCOSE BLDC GLUCOMTR-MCNC: 206 MG/DL — HIGH (ref 70–99)
GLUCOSE BLDC GLUCOMTR-MCNC: 223 MG/DL — HIGH (ref 70–99)
GLUCOSE BLDC GLUCOMTR-MCNC: 247 MG/DL — HIGH (ref 70–99)
GLUCOSE BLDC GLUCOMTR-MCNC: 85 MG/DL — SIGNIFICANT CHANGE UP (ref 70–99)
GLUCOSE SERPL-MCNC: 226 MG/DL — HIGH (ref 70–99)
GLUCOSE UR QL: NEGATIVE — SIGNIFICANT CHANGE UP
GLUCOSE UR QL: NEGATIVE — SIGNIFICANT CHANGE UP
GRAM STN FLD: SIGNIFICANT CHANGE UP
HCT VFR BLD CALC: 32.8 % — LOW (ref 34.5–45)
HGB BLD-MCNC: 10.6 G/DL — LOW (ref 11.5–15.5)
KETONES UR-MCNC: NEGATIVE — SIGNIFICANT CHANGE UP
KETONES UR-MCNC: NEGATIVE — SIGNIFICANT CHANGE UP
LEUKOCYTE ESTERASE UR-ACNC: ABNORMAL
LEUKOCYTE ESTERASE UR-ACNC: NEGATIVE — SIGNIFICANT CHANGE UP
MAGNESIUM SERPL-MCNC: 1.6 MG/DL — SIGNIFICANT CHANGE UP (ref 1.6–2.6)
MCHC RBC-ENTMCNC: 23.6 PG — LOW (ref 27–34)
MCHC RBC-ENTMCNC: 32.3 GM/DL — SIGNIFICANT CHANGE UP (ref 32–36)
MCV RBC AUTO: 72.9 FL — LOW (ref 80–100)
NITRITE UR-MCNC: NEGATIVE — SIGNIFICANT CHANGE UP
NITRITE UR-MCNC: NEGATIVE — SIGNIFICANT CHANGE UP
PH UR: 6 — SIGNIFICANT CHANGE UP (ref 5–8)
PH UR: 7.5 — SIGNIFICANT CHANGE UP (ref 5–8)
PHOSPHATE SERPL-MCNC: 2.5 MG/DL — SIGNIFICANT CHANGE UP (ref 2.5–4.5)
PLATELET # BLD AUTO: 333 K/UL — SIGNIFICANT CHANGE UP (ref 150–400)
POTASSIUM SERPL-MCNC: 3.9 MMOL/L — SIGNIFICANT CHANGE UP (ref 3.5–5.3)
POTASSIUM SERPL-SCNC: 3.9 MMOL/L — SIGNIFICANT CHANGE UP (ref 3.5–5.3)
PROT UR-MCNC: SIGNIFICANT CHANGE UP
PROT UR-MCNC: SIGNIFICANT CHANGE UP
RBC # BLD: 4.5 M/UL — SIGNIFICANT CHANGE UP (ref 3.8–5.2)
RBC # FLD: 15.4 % — HIGH (ref 10.3–14.5)
SODIUM SERPL-SCNC: 134 MMOL/L — LOW (ref 135–145)
SP GR SPEC: 1.01 — SIGNIFICANT CHANGE UP (ref 1.01–1.02)
SP GR SPEC: 1.01 — SIGNIFICANT CHANGE UP (ref 1.01–1.02)
SPECIMEN SOURCE: SIGNIFICANT CHANGE UP
UROBILINOGEN FLD QL: NEGATIVE — SIGNIFICANT CHANGE UP
UROBILINOGEN FLD QL: NEGATIVE — SIGNIFICANT CHANGE UP
WBC # BLD: 14.43 K/UL — HIGH (ref 3.8–10.5)
WBC # FLD AUTO: 14.43 K/UL — HIGH (ref 3.8–10.5)

## 2018-08-01 PROCEDURE — 71046 X-RAY EXAM CHEST 2 VIEWS: CPT | Mod: 26

## 2018-08-01 PROCEDURE — 99233 SBSQ HOSP IP/OBS HIGH 50: CPT

## 2018-08-01 PROCEDURE — 99232 SBSQ HOSP IP/OBS MODERATE 35: CPT

## 2018-08-01 RX ORDER — INSULIN LISPRO 100/ML
VIAL (ML) SUBCUTANEOUS
Qty: 0 | Refills: 0 | Status: DISCONTINUED | OUTPATIENT
Start: 2018-08-01 | End: 2018-08-02

## 2018-08-01 RX ORDER — SODIUM CHLORIDE 9 MG/ML
1000 INJECTION, SOLUTION INTRAVENOUS
Qty: 0 | Refills: 0 | Status: DISCONTINUED | OUTPATIENT
Start: 2018-08-01 | End: 2018-08-01

## 2018-08-01 RX ORDER — ACETAMINOPHEN 500 MG
1000 TABLET ORAL ONCE
Qty: 0 | Refills: 0 | Status: COMPLETED | OUTPATIENT
Start: 2018-08-01 | End: 2018-08-01

## 2018-08-01 RX ORDER — ACETAMINOPHEN 500 MG
1000 TABLET ORAL ONCE
Qty: 0 | Refills: 0 | Status: DISCONTINUED | OUTPATIENT
Start: 2018-08-01 | End: 2018-08-01

## 2018-08-01 RX ORDER — SODIUM CHLORIDE 9 MG/ML
1000 INJECTION, SOLUTION INTRAVENOUS
Qty: 0 | Refills: 0 | Status: DISCONTINUED | OUTPATIENT
Start: 2018-08-01 | End: 2018-08-02

## 2018-08-01 RX ADMIN — MORPHINE SULFATE 2 MILLIGRAM(S): 50 CAPSULE, EXTENDED RELEASE ORAL at 21:45

## 2018-08-01 RX ADMIN — Medication 4: at 06:10

## 2018-08-01 RX ADMIN — MORPHINE SULFATE 2 MILLIGRAM(S): 50 CAPSULE, EXTENDED RELEASE ORAL at 20:43

## 2018-08-01 RX ADMIN — Medication 3 MILLILITER(S): at 17:20

## 2018-08-01 RX ADMIN — Medication 3 MILLILITER(S): at 22:35

## 2018-08-01 RX ADMIN — BUDESONIDE AND FORMOTEROL FUMARATE DIHYDRATE 2 PUFF(S): 160; 4.5 AEROSOL RESPIRATORY (INHALATION) at 05:35

## 2018-08-01 RX ADMIN — Medication 10 MILLIGRAM(S): at 05:43

## 2018-08-01 RX ADMIN — Medication 1000 MILLIGRAM(S): at 11:45

## 2018-08-01 RX ADMIN — Medication 3 MILLILITER(S): at 11:40

## 2018-08-01 RX ADMIN — MORPHINE SULFATE 2 MILLIGRAM(S): 50 CAPSULE, EXTENDED RELEASE ORAL at 09:18

## 2018-08-01 RX ADMIN — MORPHINE SULFATE 2 MILLIGRAM(S): 50 CAPSULE, EXTENDED RELEASE ORAL at 06:23

## 2018-08-01 RX ADMIN — Medication 400 MILLIGRAM(S): at 11:37

## 2018-08-01 RX ADMIN — MORPHINE SULFATE 2 MILLIGRAM(S): 50 CAPSULE, EXTENDED RELEASE ORAL at 09:05

## 2018-08-01 RX ADMIN — Medication 20 MILLIGRAM(S): at 05:35

## 2018-08-01 RX ADMIN — Medication 3 MILLILITER(S): at 05:35

## 2018-08-01 RX ADMIN — Medication 2: at 00:25

## 2018-08-01 RX ADMIN — ENOXAPARIN SODIUM 40 MILLIGRAM(S): 100 INJECTION SUBCUTANEOUS at 11:40

## 2018-08-01 RX ADMIN — Medication 10 MILLIGRAM(S): at 10:43

## 2018-08-01 RX ADMIN — MORPHINE SULFATE 2 MILLIGRAM(S): 50 CAPSULE, EXTENDED RELEASE ORAL at 05:35

## 2018-08-01 RX ADMIN — TIOTROPIUM BROMIDE 1 CAPSULE(S): 18 CAPSULE ORAL; RESPIRATORY (INHALATION) at 11:40

## 2018-08-01 RX ADMIN — BUDESONIDE AND FORMOTEROL FUMARATE DIHYDRATE 2 PUFF(S): 160; 4.5 AEROSOL RESPIRATORY (INHALATION) at 17:21

## 2018-08-01 RX ADMIN — Medication 10 MILLIGRAM(S): at 16:35

## 2018-08-01 RX ADMIN — PANTOPRAZOLE SODIUM 40 MILLIGRAM(S): 20 TABLET, DELAYED RELEASE ORAL at 11:40

## 2018-08-01 RX ADMIN — Medication 10 MILLIGRAM(S): at 22:35

## 2018-08-01 RX ADMIN — Medication 4: at 12:38

## 2018-08-01 RX ADMIN — Medication 0.25 MILLIGRAM(S): at 11:39

## 2018-08-01 RX ADMIN — SODIUM CHLORIDE 50 MILLILITER(S): 9 INJECTION, SOLUTION INTRAVENOUS at 10:43

## 2018-08-01 NOTE — PROGRESS NOTE ADULT - SUBJECTIVE AND OBJECTIVE BOX
CHIEF COMPLAINT: f/up for tracheomalalcia, asthma, allergy, chronic brochitis--some cough and sob--green phlegm, some vomitus and loose bm-pain in abdomen    Interval Events: NG tube out--fever to 103.1    REVIEW OF SYSTEMS:  Constitutional: + fevers or chills. No weight loss. + fatigue or generalized malaise.  Eyes: No itching or discharge from the eyes  ENT: No ear pain. No ear discharge. No nasal congestion. No post nasal drip. No epistaxis. No throat pain. No sore throat. No difficulty swallowing.   CV: No chest pain. No palpitations. No lightheadedness or dizziness.   Resp: No dyspnea at rest. + dyspnea on exertion. No orthopnea. No wheezing. + cough. No stridor. + sputum production. No chest pain with respiration.  GI: No nausea. + vomiting. No diarrhea. + abd. pain  MSK: No joint pain or pain in any extremities  Integumentary: No skin lesions. No pedal edema.  Neurological: No gross motor weakness. No sensory changes.  [+ ] All other systems negative  [ ] Unable to assess ROS because ________    OBJECTIVE:  ICU Vital Signs Last 24 Hrs  T(C): 37.1 (01 Aug 2018 05:07), Max: 39.5 (2018 21:00)  T(F): 98.7 (01 Aug 2018 05:07), Max: 103.1 (2018 21:00)  HR: 89 (01 Aug 2018 05:07) (84 - 101)  BP: 100/65 (01 Aug 2018 05:07) (100/65 - 143/83)  BP(mean): --  ABP: --  ABP(mean): --  RR: 18 (01 Aug 2018 05:07) (10 - 18)  SpO2: 94% (01 Aug 2018 05:07) (94% - 98%)         @ 07:  -   @ 07:00  --------------------------------------------------------  IN: 2115 mL / OUT: 1500 mL / NET: 615 mL     @ 07: @ 05:08  --------------------------------------------------------  IN: 1550 mL / OUT: 725 mL / NET: 825 mL      CAPILLARY BLOOD GLUCOSE      POCT Blood Glucose.: 179 mg/dL (01 Aug 2018 00:13)      PHYSICAL EXAM: NAD in bed on RA  General: Awake, alert, oriented X 3.   HEENT: Atraumatic, normocephalic.                 Mallampatti Grade 2                No nasal congestion.                No tonsillar or pharyngeal exudates.  Lymph Nodes: No palpable lymphadenopathy  Neck: No JVD. No carotid bruit.   Respiratory: Normal chest expansion                         Normal percussion                         Normal and equal air entry                         No wheeze or rales.  ++rare rhonchi  Cardiovascular: S1 S2 normal. No murmurs, rubs or gallops.   Abdomen: Soft, non-tender, non-distended. No organomegaly. NABS  Extremities: Warm to touch. Peripheral pulse palpable. No pedal edema.   Skin: No rashes or skin lesions  Neurological: Motor and sensory examination equal and normal in all four extremities.  Psychiatry: Appropriate mood and affect.    HOSPITAL MEDICATIONS:  MEDICATIONS  (STANDING):  ALBUTerol/ipratropium for Nebulization 3 milliLiter(s) Nebulizer every 6 hours  buDESOnide 160 MICROgram(s)/formoterol 4.5 MICROgram(s) Inhaler 2 Puff(s) Inhalation two times a day  dextrose 5% + sodium chloride 0.45% with potassium chloride 20 mEq/L 1000 milliLiter(s) (100 mL/Hr) IV Continuous <Continuous>  dextrose 5%. 1000 milliLiter(s) (50 mL/Hr) IV Continuous <Continuous>  dextrose 50% Injectable 12.5 Gram(s) IV Push once  dextrose 50% Injectable 25 Gram(s) IV Push once  dextrose 50% Injectable 25 Gram(s) IV Push once  digoxin  Injectable 0.25 milliGRAM(s) IV Push daily  enoxaparin Injectable 40 milliGRAM(s) SubCutaneous daily  hydrocortisone sodium succinate Injectable 20 milliGRAM(s) IV Push daily  insulin lispro (HumaLOG) corrective regimen sliding scale   SubCutaneous every 6 hours  pantoprazole  Injectable 40 milliGRAM(s) IV Push daily  tiotropium 18 MICROgram(s) Capsule 1 Capsule(s) Inhalation daily    MEDICATIONS  (PRN):  ALBUTerol    90 MICROgram(s) HFA Inhaler 2 Puff(s) Inhalation every 6 hours PRN Shortness of Breath  dextrose 40% Gel 15 Gram(s) Oral once PRN Blood Glucose LESS THAN 70 milliGRAM(s)/deciliter  diphenhydrAMINE   Injectable 12.5 milliGRAM(s) IV Push every 4 hours PRN Itching  glucagon  Injectable 1 milliGRAM(s) IntraMuscular once PRN Glucose LESS THAN 70 milligrams/deciliter  metoclopramide Injectable 10 milliGRAM(s) IV Push every 6 hours PRN Nausea  morphine  - Injectable 2 milliGRAM(s) IV Push every 4 hours PRN Moderate Pain (4 - 6)  tetracaine/benzocaine/butamben Spray 1 Spray(s) Topical four times a day PRN NGT irritation      LABS:                        10.7   10.22 )-----------( 277      ( 2018 11:33 )             32.6     -    134<L>  |  96  |  4<L>  ----------------------------<  225<H>  3.6   |  26  |  0.58    Ca    8.2<L>      2018 07:51  Phos  2.3       Mg     1.7             Urinalysis Basic - ( 2018 23:17 )    Color: Yellow / Appearance: SL Turbid / S.010 / pH: x  Gluc: x / Ketone: Negative  / Bili: Negative / Urobili: Negative   Blood: x / Protein: Trace / Nitrite: Negative   Leuk Esterase: Negative / RBC: 3-5 /HPF / WBC 6-10 /HPF   Sq Epi: x / Non Sq Epi: OCC /HPF / Bacteria: Few /HPF            MICROBIOLOGY:     RADIOLOGY:  [ ] Reviewed and interpreted by me    Point of Care Ultrasound Findings:    PFT:    EKG:

## 2018-08-01 NOTE — PROGRESS NOTE ADULT - SUBJECTIVE AND OBJECTIVE BOX
GENERAL SURGERY DAILY PROGRESS NOTE:      Subjective: Patient seen and examined on morning rounds. Patient stable with no overnight events. Patient denies CP/ SOB/ Palpatations. Patient denies N/V. Reports No flatus and No BM.     MEDICATIONS  (STANDING):  ALBUTerol/ipratropium for Nebulization 3 milliLiter(s) Nebulizer every 6 hours  buDESOnide 160 MICROgram(s)/formoterol 4.5 MICROgram(s) Inhaler 2 Puff(s) Inhalation two times a day  dextrose 5% + sodium chloride 0.45% with potassium chloride 20 mEq/L 1000 milliLiter(s) (100 mL/Hr) IV Continuous <Continuous>  dextrose 5%. 1000 milliLiter(s) (50 mL/Hr) IV Continuous <Continuous>  dextrose 50% Injectable 12.5 Gram(s) IV Push once  dextrose 50% Injectable 25 Gram(s) IV Push once  dextrose 50% Injectable 25 Gram(s) IV Push once  digoxin  Injectable 0.25 milliGRAM(s) IV Push daily  enoxaparin Injectable 40 milliGRAM(s) SubCutaneous daily  hydrocortisone sodium succinate Injectable 20 milliGRAM(s) IV Push daily  insulin lispro (HumaLOG) corrective regimen sliding scale   SubCutaneous every 6 hours  pantoprazole  Injectable 40 milliGRAM(s) IV Push daily  tiotropium 18 MICROgram(s) Capsule 1 Capsule(s) Inhalation daily    MEDICATIONS  (PRN):  ALBUTerol    90 MICROgram(s) HFA Inhaler 2 Puff(s) Inhalation every 6 hours PRN Shortness of Breath  dextrose 40% Gel 15 Gram(s) Oral once PRN Blood Glucose LESS THAN 70 milliGRAM(s)/deciliter  diphenhydrAMINE   Injectable 12.5 milliGRAM(s) IV Push every 4 hours PRN Itching  glucagon  Injectable 1 milliGRAM(s) IntraMuscular once PRN Glucose LESS THAN 70 milligrams/deciliter  metoclopramide Injectable 10 milliGRAM(s) IV Push every 6 hours PRN Nausea  morphine  - Injectable 2 milliGRAM(s) IV Push every 4 hours PRN Moderate Pain (4 - 6)  tetracaine/benzocaine/butamben Spray 1 Spray(s) Topical four times a day PRN NGT irritation      Vital Signs Last 24 Hrs  T(C): 37.1 (01 Aug 2018 00:52), Max: 39.5 (2018 21:00)  T(F): 98.7 (01 Aug 2018 00:52), Max: 103.1 (2018 21:00)  HR: 86 (01 Aug 2018 00:52) (84 - 101)  BP: 122/81 (01 Aug 2018 00:52) (114/71 - 143/83)  BP(mean): --  RR: 18 (01 Aug 2018 00:52) (10 - 18)  SpO2: 95% (01 Aug 2018 00:52) (93% - 98%)    I&O's Detail    2018 07:01  -  2018 07:00  --------------------------------------------------------  IN:    dextrose 5% + sodium chloride 0.45% with potassium chloride 20 mEq/L: 2015 mL    Solution: 100 mL  Total IN: 2115 mL    OUT:    Colostomy: 50 mL    Nasoenteral Tube: 350 mL    VAC (Vacuum Assisted Closure) System: 300 mL    Voided: 800 mL  Total OUT: 1500 mL    Total NET: 615 mL      2018 07:01  -  01 Aug 2018 04:26  --------------------------------------------------------  IN:    dextrose 5% + sodium chloride 0.45% with potassium chloride 20 mEq/L: 1200 mL    Solution: 250 mL    Solution: 100 mL  Total IN: 1550 mL    OUT:    Emesis: 50 mL    Nasoenteral Tube: 75 mL    Voided: 600 mL  Total OUT: 725 mL    Total NET: 825 mL          Daily     Daily     LABS:                        10.7   10.22 )-----------( 277      ( 2018 11:33 )             32.6     -    134<L>  |  96  |  4<L>  ----------------------------<  225<H>  3.6   |  26  |  0.58    Ca    8.2<L>      2018 07:51  Phos  2.3     07-31  Mg     1.7     07-31        Urinalysis Basic - ( 2018 23:17 )    Color: Yellow / Appearance: SL Turbid / S.010 / pH: x  Gluc: x / Ketone: Negative  / Bili: Negative / Urobili: Negative   Blood: x / Protein: Trace / Nitrite: Negative   Leuk Esterase: Negative / RBC: 3-5 /HPF / WBC 6-10 /HPF   Sq Epi: x / Non Sq Epi: OCC /HPF / Bacteria: Few /HPF        Physical Exam:   Gen: NAD, AAOx  Abdomen:   Incision:     Assesment:   67y Female who presents with    Plan:  -DVT PPX- Lovenox/ SQH   -Pain control   -OOB as tolerated with assistance   -Advance diet as tolerated  -Await Gi Fxn   -Dispo Planning GENERAL SURGERY DAILY PROGRESS NOTE:      Subjective: Patient seen and examined on morning rounds. Overnight, 50 cc's of bileous emesis. Patient denies CP/ SOB/ Palpatations. No N/V. Reports air and stool in the pouch.     MEDICATIONS  (STANDING):  ALBUTerol/ipratropium for Nebulization 3 milliLiter(s) Nebulizer every 6 hours  buDESOnide 160 MICROgram(s)/formoterol 4.5 MICROgram(s) Inhaler 2 Puff(s) Inhalation two times a day  dextrose 5% + sodium chloride 0.45% with potassium chloride 20 mEq/L 1000 milliLiter(s) (100 mL/Hr) IV Continuous <Continuous>  dextrose 5%. 1000 milliLiter(s) (50 mL/Hr) IV Continuous <Continuous>  dextrose 50% Injectable 12.5 Gram(s) IV Push once  dextrose 50% Injectable 25 Gram(s) IV Push once  dextrose 50% Injectable 25 Gram(s) IV Push once  digoxin  Injectable 0.25 milliGRAM(s) IV Push daily  enoxaparin Injectable 40 milliGRAM(s) SubCutaneous daily  hydrocortisone sodium succinate Injectable 20 milliGRAM(s) IV Push daily  insulin lispro (HumaLOG) corrective regimen sliding scale   SubCutaneous every 6 hours  pantoprazole  Injectable 40 milliGRAM(s) IV Push daily  tiotropium 18 MICROgram(s) Capsule 1 Capsule(s) Inhalation daily    MEDICATIONS  (PRN):  ALBUTerol    90 MICROgram(s) HFA Inhaler 2 Puff(s) Inhalation every 6 hours PRN Shortness of Breath  dextrose 40% Gel 15 Gram(s) Oral once PRN Blood Glucose LESS THAN 70 milliGRAM(s)/deciliter  diphenhydrAMINE   Injectable 12.5 milliGRAM(s) IV Push every 4 hours PRN Itching  glucagon  Injectable 1 milliGRAM(s) IntraMuscular once PRN Glucose LESS THAN 70 milligrams/deciliter  metoclopramide Injectable 10 milliGRAM(s) IV Push every 6 hours PRN Nausea  morphine  - Injectable 2 milliGRAM(s) IV Push every 4 hours PRN Moderate Pain (4 - 6)  tetracaine/benzocaine/butamben Spray 1 Spray(s) Topical four times a day PRN NGT irritation      Vital Signs Last 24 Hrs  T(C): 37.1 (01 Aug 2018 00:52), Max: 39.5 (2018 21:00)  T(F): 98.7 (01 Aug 2018 00:52), Max: 103.1 (2018 21:00)  HR: 86 (01 Aug 2018 00:52) (84 - 101)  BP: 122/81 (01 Aug 2018 00:52) (114/71 - 143/83)  BP(mean): --  RR: 18 (01 Aug 2018 00:52) (10 - 18)  SpO2: 95% (01 Aug 2018 00:52) (93% - 98%)    I&O's Detail    2018 07:01  -  2018 07:00  --------------------------------------------------------  IN:    dextrose 5% + sodium chloride 0.45% with potassium chloride 20 mEq/L: 2015 mL    Solution: 100 mL  Total IN: 2115 mL    OUT:    Colostomy: 50 mL    Nasoenteral Tube: 350 mL    VAC (Vacuum Assisted Closure) System: 300 mL    Voided: 800 mL  Total OUT: 1500 mL    Total NET: 615 mL      2018 07:01  -  01 Aug 2018 04:26  --------------------------------------------------------  IN:    dextrose 5% + sodium chloride 0.45% with potassium chloride 20 mEq/L: 1200 mL    Solution: 250 mL    Solution: 100 mL  Total IN: 1550 mL    OUT:    Emesis: 50 mL    Nasoenteral Tube: 75 mL    Voided: 600 mL  Total OUT: 725 mL    Total NET: 825 mL          Daily     Daily     LABS:                        10.7   10.22 )-----------( 277      ( 2018 11:33 )             32.6         134<L>  |  96  |  4<L>  ----------------------------<  225<H>  3.6   |  26  |  0.58    Ca    8.2<L>      2018 07:51  Phos  2.3     07-  Mg     1.7     07-        Urinalysis Basic - ( 2018 23:17 )    Color: Yellow / Appearance: SL Turbid / S.010 / pH: x  Gluc: x / Ketone: Negative  / Bili: Negative / Urobili: Negative   Blood: x / Protein: Trace / Nitrite: Negative   Leuk Esterase: Negative / RBC: 3-5 /HPF / WBC 6-10 /HPF   Sq Epi: x / Non Sq Epi: OCC /HPF / Bacteria: Few /HPF        Physical Exam:   Gen: NAD, AAOx  Abdomen: soft, NT, ND  Incision: clean/dry/intact     Assessment:  67y Female who presents with Malignant neoplasm of anal canal    Plan:  -Chest PT  -DVT PPX  -Pain control   -OOB as tolerated with assistance   -Advance diet as tolerated  -Await Gi Fxn   -Dispo Planning

## 2018-08-01 NOTE — PROGRESS NOTE ADULT - ASSESSMENT
67 yr F with PMH of COPD/asthma, tracheobronchomalacia s/p tracheo-bronchoplasty in 10/16, Afib on Eliquis , Adrenal Insufficieny on steroids,  DM2, HTN, Squamous cell CA of the anus on chemo/XRT, now s/p elective abdominoperineal proctectomy for recurrent exophytic anal cancer on 7/24/18    1. Cough:  - Currently stable pulm status  -  Sputum Cx--pending  - In the past her SCxs/ BAL cxs have shown Grp B strept, Acinetobacter, Achromobacter, Pasteurella and Serratia  -  Off Antibiotic regimen of Aztreonam and Clindamycin as of 7/27   - On nebulized hypertonic saline Q12H (preceded by Duonebs) to assist with mobilization of secretions  - Cont close monitoring of resp symptoms. Optimize pain to prevent resp splinting  - Incentive spirometry/ acapella use/ OBC and early mobilization    2. COPD/ Asthma/ Tracheomalacia s/p tracheobronchoplasty- on Duonebs Q6H  - continue Symbicort BID (takes Breo ellipta at home which is non formularly)  - continue Spiriva daily  - Once tolerating PO intake, can restart home dose of Singulair daily  - Supplemental O2 to keep sats > 90 %    SEE below as well--NGT out 7/31

## 2018-08-01 NOTE — CHART NOTE - NSCHARTNOTEFT_GEN_A_CORE
Patient seen at the bedside for afternoon rounds at 1500. Patient was hypotensive at last vital signs obtained. Asymptomatic. Denies dizziness and lightheadedness. Patient also denies abdominal pain, chest pain, nausea, vomiting.  Patient states that she was walking this afternoon and did not have any dizziness.     Vital Signs Last 24 Hrs  T(C): 36.7 (01 Aug 2018 14:12), Max: 39.5 (31 Jul 2018 21:00)  T(F): 98 (01 Aug 2018 14:12), Max: 103.1 (31 Jul 2018 21:00)  HR: 84 (01 Aug 2018 14:12) (84 - 101)  BP: 95/62 (01 Aug 2018 14:12) (95/62 - 143/83)  BP(mean): --  RR: 18 (01 Aug 2018 14:12) (10 - 18)  SpO2: 98% (01 Aug 2018 14:12) (94% - 98%)    Physical Exam  General: A&O x 3, NAD  Cardiac: RRR  Pulmonary: CTAB, no wheezing   Gastrointestinal: soft, non-tender, non-distended, ostomy in place with brown stool, lower midline incision C/D/I    A/P   67 yr F with PMH of COPD/asthma, tracheobronchomalacia s/p tracheo-bronchoplasty in 10/16, Afib on Eliquis , Adrenal Insufficieny on steroids,  DM2, HTN, Squamous cell CA of the anus on chemo/XRT, now s/p elective abdominoperineal proctectomy for recurrent exophytic anal cancer on 7/24/18.    Hypotension  - Increase IVF to 75 cc/hour  - Repeat BP 1 hour after increase of IVF   - Will continue to monitor    Alethea Hernández PA-C   #[id number] Patient seen at the bedside for afternoon rounds at 1500. Patient was hypotensive at last vital signs obtained. Asymptomatic. Denies dizziness and lightheadedness. Patient also denies abdominal pain, chest pain, nausea, vomiting.  Patient states that she was walking this afternoon and did not have any dizziness.     Vital Signs Last 24 Hrs  T(C): 36.7 (01 Aug 2018 14:12), Max: 39.5 (31 Jul 2018 21:00)  T(F): 98 (01 Aug 2018 14:12), Max: 103.1 (31 Jul 2018 21:00)  HR: 84 (01 Aug 2018 14:12) (84 - 101)  BP: 95/62 (01 Aug 2018 14:12) (95/62 - 143/83)  BP(mean): --  RR: 18 (01 Aug 2018 14:12) (10 - 18)  SpO2: 98% (01 Aug 2018 14:12) (94% - 98%)    Physical Exam  General: A&O x 3, NAD  Cardiac: RRR  Pulmonary: CTAB, no wheezing   Gastrointestinal: soft, slight tenderness in right lowered quadrant, non-distended, ostomy in place with brown stool, lower midline incision C/D/I    A/P   67 yr F with PMH of COPD/asthma, tracheobronchomalacia s/p tracheo-bronchoplasty in 10/16, Afib on Eliquis , Adrenal Insufficieny on steroids,  DM2, HTN, Squamous cell CA of the anus on chemo/XRT, now s/p elective abdominoperineal proctectomy for recurrent exophytic anal cancer on 7/24/18.    Hypotension  - Increase IVF to 75 cc/hour  - Repeat BP 1 hour after increase of IVF   - Will continue to monitor    Alethea Hernández PA-C   #[id number] Patient seen at the bedside for afternoon rounds at 1500. Patient was hypotensive at last vital signs obtained. Asymptomatic. Denies dizziness and lightheadedness. Patient also denies abdominal pain, chest pain, nausea, vomiting.  Patient states that she was walking this afternoon and did not have any dizziness.     Vital Signs Last 24 Hrs  T(C): 36.7 (01 Aug 2018 14:12), Max: 39.5 (31 Jul 2018 21:00)  T(F): 98 (01 Aug 2018 14:12), Max: 103.1 (31 Jul 2018 21:00)  HR: 84 (01 Aug 2018 14:12) (84 - 101)  BP: 95/62 (01 Aug 2018 14:12) (95/62 - 143/83)  BP(mean): --  RR: 18 (01 Aug 2018 14:12) (10 - 18)  SpO2: 98% (01 Aug 2018 14:12) (94% - 98%)    Urine Microscopic-Add On (NC) (07.31.18 @ 23:17)    Bacteria: Few /HPF    Comment - Urine: Few Yeast.    Epithelial Cells: OCC /HPF    Red Blood Cell - Urine: 3-5 /HPF    White Blood Cell - Urine: 6-10 /HPF  Urinalysis (07.31.18 @ 23:17)    pH Urine: 7.5    Glucose Qualitative, Urine: Negative    Blood, Urine: Negative    Color: Yellow    Urine Appearance: SL Turbid    Bilirubin: Negative    Ketone - Urine: Negative    Specific Gravity: 1.010    Protein, Urine: Trace    Urobilinogen: Negative    Nitrite: Negative    Leukocyte Esterase Concentration: Negative    < from: Xray Chest 2 Views PA/Lat (08.01.18 @ 10:25) >    IMPRESSION:  Right Mediport with tip overlying the SVC. Right lower lobe opacity   representing a combination of atelectasis and pneumonia. Cardiac   silhouette is unremarkable. Visualized osseous structures are   unremarkable. Mildly distended loops of bowel are seen within the upper   abdomen which are of unclear etiology.    < end of copied text >    Physical Exam  General: A&O x 3, NAD  Cardiac: RRR  Pulmonary: CTAB, no wheezing   Gastrointestinal: soft, slight tenderness in right lowered quadrant, non-distended, ostomy in place with brown stool, lower midline incision C/D/I    A/P   67 yr F with PMH of COPD/asthma, tracheobronchomalacia s/p tracheo-bronchoplasty in 10/16, Afib on Eliquis , Adrenal Insufficieny on steroids,  DM2, HTN, Squamous cell CA of the anus on chemo/XRT, now s/p elective abdominoperineal proctectomy for recurrent exophytic anal cancer on 7/24/18.    Hypotension  - Increase IVF to 75 cc/hour  - Repeat BP 1 hour after increase of IVF   - Will continue to monitor    Alethea Hernández PA-C   #[id number]    Addendum 1600  Dr. Luong aware of the hypotension. Possible CT scan in AM if hypotension continues.   Alethea Hernández PA-C

## 2018-08-01 NOTE — PROGRESS NOTE ADULT - SUBJECTIVE AND OBJECTIVE BOX
Consult:  · Requested by Name:	Terrell Arango	  · Date/Time:	2018 	  · Reason for Referral/Consultation:	Perioperative management of cardiac conditions	      · Subjective and Objective: 	  **********              CARDIOLOGY CONSULT PROGRESS NOTE              ************  ============================================================  CHIEF COMPLAINT/REASON FOR CONSULT:  Patient is a 67y old  Female who presents with a chief complaint of rectal bleeding (2018 10:00)      HISTORY OF PRESENT ILLNESS:  67yFemale with a history of Remote Asthma, DVT, TIA, COPD, Moderate AI, Normal LVEF, pAF on Digoxin/Eliquis prior to admission, Colorectal CA p/w bleeding s/p SCC resection POD#1  - presenting for SCC resection.  No CP/SOB/Palps.         ============================================================  24 hr events  - BP controlled; HR controlled  - Continues on IV Dig (level low)  - WBC up and febrile  ============================================================      Allergies    ampicillin (Short breath)  aspirin (Short breath)  Avelox (Short breath; Pruritus)  codeine (Short breath)  Dilaudid (Short breath)  iodine (Short breath; Swelling)  penicillin (Short breath)  shellfish (Anaphylaxis)  tetanus toxoid (Short breath)  Valium (Short breath)    Intolerances    	    MEDICATIONS:  heparin  Injectable 5000 Unit(s) SubCutaneous every 8 hours    aztreonam  IVPB 2000 milliGRAM(s) IV Intermittent once  clindamycin IVPB 900 milliGRAM(s) IV Intermittent once    ALBUTerol    90 MICROgram(s) HFA Inhaler 2 Puff(s) Inhalation every 6 hours PRN  ALBUTerol/ipratropium for Nebulization 3 milliLiter(s) Nebulizer every 6 hours  diphenhydrAMINE   Injectable 12.5 milliGRAM(s) IV Push every 4 hours PRN  tiotropium 18 MICROgram(s) Capsule 1 Capsule(s) Inhalation daily    morphine PCA (5 mG/mL) 30 milliLiter(s) PCA Continuous PCA Continuous  morphine PCA (5 mG/mL) Rescue Clinician Bolus 2.5 milliGRAM(s) IV Push every 15 minutes PRN    pantoprazole  Injectable 40 milliGRAM(s) IV Push daily    dextrose 40% Gel 15 Gram(s) Oral once PRN  dextrose 50% Injectable 12.5 Gram(s) IV Push once  dextrose 50% Injectable 25 Gram(s) IV Push once  dextrose 50% Injectable 25 Gram(s) IV Push once  glucagon  Injectable 1 milliGRAM(s) IntraMuscular once PRN  insulin lispro (HumaLOG) corrective regimen sliding scale   SubCutaneous every 6 hours    dextrose 5% + sodium chloride 0.45%. 1000 milliLiter(s) IV Continuous <Continuous>  dextrose 5%. 1000 milliLiter(s) IV Continuous <Continuous>      PAST MEDICAL & SURGICAL HISTORY:  TIA (transient ischemic attack): multiple, last 5 years ago - presents as right-sided weakness  DVT (deep venous thrombosis): 15-20 years ago, took coumadin  Seizure: x 1 18  Rectal bleeding  Colorectal cancer: 2018- last treatment , chemo and radiation  Tracheobronchomalacia: diagnosed , s/p bronchial thermoplasty  (Dr Zapien); recent bronchoscopy 2018 revealed no evidence of tracheobronchomalacia in trachea or bronchial tubes  Asthma  Pelvic fracture  Aortic insufficiency: moderate AR on echo 5/3/2018  Adrenal insufficiency: Medrol daily for over 50 years  COPD (chronic obstructive pulmonary disease)  Diabetes: Type 2  Atrial fibrillation: paroxysmal, on eliquis  Rectal bleeding: exam under anesthesia ([facility]) 2018  S/P bronchoscopy: 2018 - [facility] (Dr Zapien) no evidence of tracheobronchomalacia in trachea or bronchial tubes  History of tracheomalacia:  - attempted tracheal stenting ([facility])- course complicated by obstruction, respiratory failure, multiple CPR attempts -  stent discontinued; 10/20/2016 Tracheobronchoplasty (Prolene Mesh) performed at [facility] by Dr Zapien  H/O pelvic surgery: 5 years ago - s/p fracture  Exostosis of orbit, 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  History of sinus surgery: multiple sinus surgeries  H/O total knee replacement, bilateral: 5 years ago  History of partial hysterectomy: 30 years ago - fibroids      FAMILY HISTORY:  Family history of diabetes mellitus type II  Family history of breast cancer (Sibling)  Family history of asthma    NC -   Mother - HTN  Father - DM    SOCIAL HISTORY:    [ x] Non-smoker  [x] No Illicit Drug Use  [ x] No Excess EtOH Use      REVIEW OF SYSTEMS: (Unless + Before Symptom, it is negative)  Constitutional: No Fever, +Fatigue, Weight Changes  Eyes: No Recent Vision Changes, Eye Pain  ENT: No Congestion, Sore Throat  Endocrine: No Excess Sweating, Temperature Intolerance  Cardiovascular: No Chest Pain, Palpitations, Shortness of Breath, Pre-syncope, Syncope, LE Edema  Respiratory: No Cough, Congestion, Wheezing  Gastrointestinal: +Abdominal Pain, Nausea, Vomiting  Genitourinary: No dysuria, hematuria  Musculoskeletal: No Joint Pain, Swelling  Neurologic: No headaches, Imbalance, Weakness  Skin: No rashes, hematoma, purprura    ================================    PHYSICAL EXAM:  Vital Signs Last 24 Hrs  T(C): 36.8 (01 Aug 2018 16:33), Max: 39.5 (2018 21:00)  T(F): 98.2 (01 Aug 2018 16:33), Max: 103.1 (2018 21:00)  HR: 70 (01 Aug 2018 16:33) (70 - 101)  BP: 117/64 (01 Aug 2018 16:33) (95/62 - 143/83)  BP(mean): --  RR: 18 (01 Aug 2018 16:33) (10 - 18)  SpO2: 96% (01 Aug 2018 16:33) (94% - 98%)    Appearance: Normal; NAD	  HEENT:   Normal oral mucosa, EOMI	  Lymphatic: No lymphadenopathy  Cardiovascular: Normal S1 S2, No JVD, No murmurs, No edema  Respiratory: Lungs clear to auscultation, no use of accessory muscles	  Psychiatry: A & O x 3, Mood & affect appropriate  Gastrointestinal:  Soft, +Distended +Less Tender +Ostomy  Skin: No rashes, No ecchymoses, No cyanosis	  Neurologic: Non-focal, No Focal Deficits  Extremities: Normal range of motion, No clubbing, cyanosis or edema  Vascular: Peripheral pulses palpable 2+ bilaterally, no prominent varicosities    ============================    LABS:	   Labs     CBC Full  -  ( 2018 04:32 )  WBC Count : 14.8 K/uL  Hemoglobin : 11.4 g/dL  Hematocrit : 37.5 %  Platelet Count - Automated : 201 K/uL  Mean Cell Volume : 76.1 fl  Mean Cell Hemoglobin : 23.1 pg  Mean Cell Hemoglobin Concentration : 30.3 gm/dL  Auto Neutrophil # : x  Auto Lymphocyte # : x  Auto Monocyte # : x  Auto Eosinophil # : x  Auto Basophil # : x  Auto Neutrophil % : x  Auto Lymphocyte % : x  Auto Monocyte % : x  Auto Eosinophil % : x  Auto Basophil % : x    07-    142  |  106  |  10  ----------------------------<  191<H>  4.6   |  26  |  0.77  07-25    140  |  104  |  9   ----------------------------<  201<H>  4.4   |  25  |  0.73    Ca    8.2<L>      2018 04:32  Ca    8.2<L>      2018 00:16  Phos  4.2     07-25  Phos  4.1     07-25  Mg     2.1     07-25  Mg     2.0     07-25          =========================================================================  CXR:  < from: Xray Chest 1 View- PORTABLE-Urgent (18 @ 16:02) >    EXAM:  XR CHEST PORTABLE URGENT 1V                          PROCEDURE DATE:  2018          INTERPRETATION:  Portable Chest X-Ray dated 2018 4:02 PM    Indication: s/p bronch    Prior studies: 2018    An AP portable view of the chest revealed cardiomegaly. Trachea midline.   No pneumothorax seen. Trace right pleural effusion. Increased markings in   the left lower lobe. Right Port-A-Cath with the tip in the right atrium.    IMPRESSION:  No pneumothorax seen. Trace right pleural effusion.             "Thank you for the opportunity to participate in the care of this   patient."    < end of copied text >      ECG:  	    PREVIOUS DIAGNOSTIC TESTING:    [X] Echocardiogram:  < from: Echocardiogram (18 @ 10:55) >      INTERPRETATION:  Patient Height: 170.2 cm  Patient Weight: 73.0 kg  Heart Rate: 70 bpm  Systolic Pressure: 132 mmHg  Diastolic Pressure: 70 mmHg  BSA: 1.8 m^2  Interpretation Summary  The left atrial size is normal. Right atrial size is normal.There is mild   aortic valve thickening. The aortic valve is trileaflet. There is   moderate   aortic regurgitation. No hemodynamically significantvalvular aortic   stenosis.    There is mild mitral valve thickening. There is mild mitral annular   calcification. There is trace mitral regurgitation.  Structurally normal   tricuspid valve. There is trace tricuspid regurgitation.There was   insufficient   TR detected from which to calculate pulmonary artery systolic pressure.    The   pulmonic valve is not well visualized. No pulmonic regurgitation   noted.The   right ventricle is normal in size and function.There is moderate   concentric   left ventricular hypertrophy. The left ventricular wall motion is   normal. The   left ventricular ejection fraction is 63%.  No aortic root   dilatation.There is   no pericardial effusion.When compared to prior study performed on   10/21/16,   there isno significant change.  Procedure Details  A complete two-dimensional transthoracic echocardiogram was performed (2D,  M-mode, spectral and color flow doppler).  Study Quality: Fair.  Left Ventricle  There is moderate concentric left ventricular hypertrophy.  The left ventricular wall motion is normal.  The left ventricular ejection fraction is 63%.  Left Atrium  The left atrial size is normal.  Right Atrium  Right atrial size is normal.  Right Ventricle  The right ventricle is normal in size andfunction.  Aortic Valve  There is mild aortic valve thickening.  The aortic valve is trileaflet.  There is moderate aortic regurgitation.  No hemodynamically significant valvular aortic stenosis.  Mitral Valve  There is mild mitral valve thickening.  There is mild mitral annular calcification.  There is trace mitral regurgitation.  Tricuspid Valve  Structurally normal tricuspid valve.  There was insufficient TR detected from which to calculate pulmonary   artery  systolic pressure.  There is trace tricuspid regurgitation.  Pulmonic Valve  The pulmonic valve is not well visualized.  No pulmonic regurgitation noted.  Arteries and Venous System  No aortic root dilatation.  The inferior vena cava is normal in size (<2.1 cm) with normal inspiratory  collapse (>50%) consistent with normal right atrial pressure.  Pericardium / Pleura  There is no pericardial effusion.  Doppler Measurements & Calculations  MV E point: 67.9 cm/sec  MV A point: 91.3 cm/sec  MV E/A: 0.7  MV dec time: 0.2 sec  Ao V2 max: 172.4 cm/sec  Ao max P.9 mmHg  Ao max PG (full): 5.5 mmHg  SUSANA(V,A): 2.4 cm^2  SUSANA(V,D): 2.4 cm^2  AI max lynette: 399.1 cm/sec  AI max P.7 mmHg  AI dec slope: 275.9 cm/sec^2  AI P1/2t: 423.7 msec  LV max P.4 mmHg  LV V1 max: 126.5 cm/sec  MMode 2D Measurements & Calculations  IVSd: 1.3 cm  LVIDd: 4.2 cm  LVIDs: 2.9 cm  LVPWd: 1.1 cm  IVS/LVPW: 1.2  FS: 31.3 %  EDV(Teich): 80.1 ml  ESV(Teich): 32.5 ml  LV mass(C)d: 180.5 grams  LV mass(C)dI: 97.9 grams/m^2  SI(cubed): 27.8 ml/m^2  Ao root diam: 3.3 cm  Ao root area: 8.8 cm^2  LA dimension: 4.6 cm  LA/Ao: 1.4  LVOT diam: 2.0 cm  LVOT area: 3.2 cm^2  LVOT area (M): 3.8 cm^2  LVLd ap4: 7.6 cm  EDV(MOD-sp4): 70 ml  LVLs ap4: 6.6 cm  ESV(MOD-sp4): 22 ml  EF(MOD-sp4): 68.6 %  LVLd ap2: 7.4 cm  EDV(MOD-sp2): 43 ml  LVLs ap2: 5.9 cm  ESV(MOD-sp2): 16 ml  EF(MOD-sp2): 62.8 %  SV(MOD-sp4): 48 ml  SI(MOD-sp4): 26.0 ml/m^2  SV(MOD-sp2): 27 ml  SI(MOD-sp2): 14.6 ml/m^2  Interpreting Physician:OMAR Dias electronically signed on   2018 12:45:03        =========================================================================  ASSESSMENT:    67yFemale with a history of Remote Asthma, DVT, TIA, COPD, Moderate AI, Normal LVEF, pAF on Digoxin/Eliquis prior to admission, Colorectal CA p/w bleeding s/p SCC resection POD#1  - presenting for SCC resection.    Recs  - Resume Eliquis when able  - Continue Digoxin  - Hold Diovan  - Careful with fluid load given AI  - Thank you for this consult.  - Will Follow      Please call with questions.     Baron Tristan MD, [facility]  [phone]

## 2018-08-01 NOTE — PROGRESS NOTE ADULT - ATTENDING COMMENTS
as above-Stable at present--off abx--+ ostomy function--new temps--work up in progress (CXR, sputum, blood, urine etc.)  Pulm stable--TBM, asthma, chronic bronchitis-atelectasis due to pain  symbicort/spiriva/supplemental O2, medrol 4mg (currently IV -change to PO once able) -out pt xolair  acapella -incentive spirometry  pain control  ambulate--as per CRS--pain control and motility agents.   Rehab pending    Eulalio Allison MD-Pulmonary   [phone]

## 2018-08-01 NOTE — CHART NOTE - NSCHARTNOTEFT_GEN_A_CORE
Discussed chest x-ray results with Dr. Allison. Per Dr. Allison will await sputum culture to see if treatment is warranted.  Alethea Hernández PA-C  #[id number] Discussed chest x-ray results with Dr. Allison. Per Dr. Allison will await sputum culture to see if treatment is warranted. As per Dr. Allison, no intervention at this time.   Alethea Hernández PA-C  #[id number]

## 2018-08-02 LAB
ANION GAP SERPL CALC-SCNC: 12 MMOL/L — SIGNIFICANT CHANGE UP (ref 5–17)
BUN SERPL-MCNC: <4 MG/DL — LOW (ref 7–23)
CALCIUM SERPL-MCNC: 7.9 MG/DL — LOW (ref 8.4–10.5)
CHLORIDE SERPL-SCNC: 95 MMOL/L — LOW (ref 96–108)
CO2 SERPL-SCNC: 26 MMOL/L — SIGNIFICANT CHANGE UP (ref 22–31)
CREAT SERPL-MCNC: 0.6 MG/DL — SIGNIFICANT CHANGE UP (ref 0.5–1.3)
CULTURE RESULTS: SIGNIFICANT CHANGE UP
CULTURE RESULTS: SIGNIFICANT CHANGE UP
GLUCOSE BLDC GLUCOMTR-MCNC: 171 MG/DL — HIGH (ref 70–99)
GLUCOSE BLDC GLUCOMTR-MCNC: 223 MG/DL — HIGH (ref 70–99)
GLUCOSE SERPL-MCNC: 201 MG/DL — HIGH (ref 70–99)
HCT VFR BLD CALC: 31.4 % — LOW (ref 34.5–45)
HCT VFR BLD CALC: 33.4 % — LOW (ref 34.5–45)
HGB BLD-MCNC: 10 G/DL — LOW (ref 11.5–15.5)
HGB BLD-MCNC: 10.5 G/DL — LOW (ref 11.5–15.5)
MAGNESIUM SERPL-MCNC: 1.7 MG/DL — SIGNIFICANT CHANGE UP (ref 1.6–2.6)
MCHC RBC-ENTMCNC: 22.8 PG — LOW (ref 27–34)
MCHC RBC-ENTMCNC: 23.4 PG — LOW (ref 27–34)
MCHC RBC-ENTMCNC: 31.5 GM/DL — LOW (ref 32–36)
MCHC RBC-ENTMCNC: 31.8 GM/DL — LOW (ref 32–36)
MCV RBC AUTO: 71.5 FL — LOW (ref 80–100)
MCV RBC AUTO: 74.2 FL — LOW (ref 80–100)
PHOSPHATE SERPL-MCNC: 2.5 MG/DL — SIGNIFICANT CHANGE UP (ref 2.5–4.5)
PLATELET # BLD AUTO: 408 K/UL — HIGH (ref 150–400)
PLATELET # BLD AUTO: 440 K/UL — HIGH (ref 150–400)
POTASSIUM SERPL-MCNC: 3.7 MMOL/L — SIGNIFICANT CHANGE UP (ref 3.5–5.3)
POTASSIUM SERPL-SCNC: 3.7 MMOL/L — SIGNIFICANT CHANGE UP (ref 3.5–5.3)
RBC # BLD: 4.39 M/UL — SIGNIFICANT CHANGE UP (ref 3.8–5.2)
RBC # BLD: 4.5 M/UL — SIGNIFICANT CHANGE UP (ref 3.8–5.2)
RBC # FLD: 13.9 % — SIGNIFICANT CHANGE UP (ref 10.3–14.5)
RBC # FLD: 15.1 % — HIGH (ref 10.3–14.5)
SODIUM SERPL-SCNC: 133 MMOL/L — LOW (ref 135–145)
SPECIMEN SOURCE: SIGNIFICANT CHANGE UP
SPECIMEN SOURCE: SIGNIFICANT CHANGE UP
WBC # BLD: 10.56 K/UL — HIGH (ref 3.8–10.5)
WBC # BLD: 11.9 K/UL — HIGH (ref 3.8–10.5)
WBC # FLD AUTO: 10.56 K/UL — HIGH (ref 3.8–10.5)
WBC # FLD AUTO: 11.9 K/UL — HIGH (ref 3.8–10.5)

## 2018-08-02 PROCEDURE — 71045 X-RAY EXAM CHEST 1 VIEW: CPT | Mod: 26,77

## 2018-08-02 PROCEDURE — 74176 CT ABD & PELVIS W/O CONTRAST: CPT | Mod: 26

## 2018-08-02 PROCEDURE — 99232 SBSQ HOSP IP/OBS MODERATE 35: CPT

## 2018-08-02 PROCEDURE — 99233 SBSQ HOSP IP/OBS HIGH 50: CPT

## 2018-08-02 PROCEDURE — 71045 X-RAY EXAM CHEST 1 VIEW: CPT | Mod: 26

## 2018-08-02 RX ORDER — MAGNESIUM SULFATE 500 MG/ML
2 VIAL (ML) INJECTION ONCE
Qty: 0 | Refills: 0 | Status: COMPLETED | OUTPATIENT
Start: 2018-08-02 | End: 2018-08-02

## 2018-08-02 RX ORDER — ONDANSETRON 8 MG/1
4 TABLET, FILM COATED ORAL ONCE
Qty: 0 | Refills: 0 | Status: DISCONTINUED | OUTPATIENT
Start: 2018-08-02 | End: 2018-08-08

## 2018-08-02 RX ORDER — CHLORHEXIDINE GLUCONATE 213 G/1000ML
1 SOLUTION TOPICAL
Qty: 0 | Refills: 0 | Status: DISCONTINUED | OUTPATIENT
Start: 2018-08-02 | End: 2018-08-08

## 2018-08-02 RX ORDER — ACETAMINOPHEN 500 MG
1000 TABLET ORAL ONCE
Qty: 0 | Refills: 0 | Status: COMPLETED | OUTPATIENT
Start: 2018-08-02 | End: 2018-08-02

## 2018-08-02 RX ORDER — INSULIN LISPRO 100/ML
VIAL (ML) SUBCUTANEOUS EVERY 6 HOURS
Qty: 0 | Refills: 0 | Status: DISCONTINUED | OUTPATIENT
Start: 2018-08-02 | End: 2018-08-05

## 2018-08-02 RX ORDER — DEXTROSE MONOHYDRATE, SODIUM CHLORIDE, AND POTASSIUM CHLORIDE 50; .745; 4.5 G/1000ML; G/1000ML; G/1000ML
1000 INJECTION, SOLUTION INTRAVENOUS
Qty: 0 | Refills: 0 | Status: DISCONTINUED | OUTPATIENT
Start: 2018-08-02 | End: 2018-08-07

## 2018-08-02 RX ADMIN — Medication 0.25 MILLIGRAM(S): at 12:33

## 2018-08-02 RX ADMIN — Medication 50 GRAM(S): at 10:18

## 2018-08-02 RX ADMIN — BUDESONIDE AND FORMOTEROL FUMARATE DIHYDRATE 2 PUFF(S): 160; 4.5 AEROSOL RESPIRATORY (INHALATION) at 05:05

## 2018-08-02 RX ADMIN — SODIUM CHLORIDE 75 MILLILITER(S): 9 INJECTION, SOLUTION INTRAVENOUS at 01:20

## 2018-08-02 RX ADMIN — TIOTROPIUM BROMIDE 1 CAPSULE(S): 18 CAPSULE ORAL; RESPIRATORY (INHALATION) at 12:34

## 2018-08-02 RX ADMIN — PANTOPRAZOLE SODIUM 40 MILLIGRAM(S): 20 TABLET, DELAYED RELEASE ORAL at 12:32

## 2018-08-02 RX ADMIN — Medication 10 MILLIGRAM(S): at 12:31

## 2018-08-02 RX ADMIN — Medication 400 MILLIGRAM(S): at 04:56

## 2018-08-02 RX ADMIN — Medication 3 MILLILITER(S): at 05:05

## 2018-08-02 RX ADMIN — Medication 2: at 19:00

## 2018-08-02 RX ADMIN — ENOXAPARIN SODIUM 40 MILLIGRAM(S): 100 INJECTION SUBCUTANEOUS at 12:32

## 2018-08-02 RX ADMIN — MORPHINE SULFATE 2 MILLIGRAM(S): 50 CAPSULE, EXTENDED RELEASE ORAL at 01:02

## 2018-08-02 RX ADMIN — Medication 20 MILLIGRAM(S): at 05:05

## 2018-08-02 RX ADMIN — Medication 1000 MILLIGRAM(S): at 05:30

## 2018-08-02 RX ADMIN — Medication 30 MILLILITER(S): at 04:08

## 2018-08-02 RX ADMIN — Medication 10 MILLIGRAM(S): at 04:35

## 2018-08-02 RX ADMIN — Medication 3 MILLILITER(S): at 12:33

## 2018-08-02 RX ADMIN — MORPHINE SULFATE 2 MILLIGRAM(S): 50 CAPSULE, EXTENDED RELEASE ORAL at 02:17

## 2018-08-02 RX ADMIN — Medication 400 MILLIGRAM(S): at 22:39

## 2018-08-02 RX ADMIN — BUDESONIDE AND FORMOTEROL FUMARATE DIHYDRATE 2 PUFF(S): 160; 4.5 AEROSOL RESPIRATORY (INHALATION) at 19:00

## 2018-08-02 RX ADMIN — Medication 4: at 12:30

## 2018-08-02 RX ADMIN — DEXTROSE MONOHYDRATE, SODIUM CHLORIDE, AND POTASSIUM CHLORIDE 75 MILLILITER(S): 50; .745; 4.5 INJECTION, SOLUTION INTRAVENOUS at 16:18

## 2018-08-02 NOTE — PROGRESS NOTE ADULT - SUBJECTIVE AND OBJECTIVE BOX
CHIEF COMPLAINT: f/up for sob, asthma, chronic bronchitisi/bronchiectasis, tbm-fever--vomitus over night-pain in stomach; no signig sob except exertion--usual cough and sputum  CXR done--no changes ; ambulating    REVIEW OF SYSTEMS:  Constitutional: No fevers or chills. No weight loss. + fatigue or generalized malaise.  Eyes: No itching or discharge from the eyes  ENT: No ear pain. No ear discharge. No nasal congestion. No post nasal drip. No epistaxis. No throat pain. No sore throat. No difficulty swallowing.   CV: No chest pain. No palpitations. No lightheadedness or dizziness.   Resp: No dyspnea at rest. + dyspnea on exertion. No orthopnea. No wheezing. + cough. No stridor. + sputum production. No chest pain with respiration.  GI: +- nausea.+ vomiting. No diarrhea.  MSK: No joint pain or pain in any extremities  Integumentary: No skin lesions. No pedal edema.  Neurological: No gross motor weakness. No sensory changes.  [ ] All other systems negative  [ ] Unable to assess ROS because ________    OBJECTIVE:  ICU Vital Signs Last 24 Hrs  T(C): 37.7 (02 Aug 2018 00:36), Max: 37.7 (01 Aug 2018 22:29)  T(F): 99.8 (02 Aug 2018 00:36), Max: 99.9 (01 Aug 2018 22:29)  HR: 94 (02 Aug 2018 00:36) (70 - 94)  BP: 127/69 (02 Aug 2018 00:36) (95/62 - 127/69)  BP(mean): --  ABP: --  ABP(mean): --  RR: 18 (02 Aug 2018 00:36) (18 - 18)  SpO2: 92% (02 Aug 2018 00:36) (92% - 98%)         @ :  -   @ 07:00  --------------------------------------------------------  IN: 2675 mL / OUT: 1125 mL / NET: 1550 mL     @ 07:01  -  08-02 @ 05:06  --------------------------------------------------------  IN: 840 mL / OUT: 1550 mL / NET: -710 mL      CAPILLARY BLOOD GLUCOSE      POCT Blood Glucose.: 131 mg/dL (01 Aug 2018 22:22)      PHYSICAL EXAM: NAD in bed on RA  General: Awake, alert, oriented X 3.   HEENT: Atraumatic, normocephalic.                 Mallampatti Grade 2                No nasal congestion.                No tonsillar or pharyngeal exudates.  Lymph Nodes: No palpable lymphadenopathy  Neck: No JVD. No carotid bruit.   Respiratory: abnormal chest expansion-reduced bx bases                         Normal percussion                         reduced but  equal air entry                         No wheeze, rhonchi or rales.  Cardiovascular: S1 S2 normal. No murmurs, rubs or gallops.   Abdomen: Soft, non-tender, non-distended. No organomegaly. + BS  Extremities: Warm to touch. Peripheral pulse palpable. No pedal edema.   Skin: No rashes or skin lesions  Neurological: Motor and sensory examination equal and normal in all four extremities.  Psychiatry: Appropriate mood and affect.    HOSPITAL MEDICATIONS:  MEDICATIONS  (STANDING):  ALBUTerol/ipratropium for Nebulization 3 milliLiter(s) Nebulizer every 6 hours  buDESOnide 160 MICROgram(s)/formoterol 4.5 MICROgram(s) Inhaler 2 Puff(s) Inhalation two times a day  dextrose 5% + sodium chloride 0.45%. 1000 milliLiter(s) (75 mL/Hr) IV Continuous <Continuous>  dextrose 5%. 1000 milliLiter(s) (50 mL/Hr) IV Continuous <Continuous>  dextrose 50% Injectable 12.5 Gram(s) IV Push once  dextrose 50% Injectable 25 Gram(s) IV Push once  dextrose 50% Injectable 25 Gram(s) IV Push once  digoxin  Injectable 0.25 milliGRAM(s) IV Push daily  enoxaparin Injectable 40 milliGRAM(s) SubCutaneous daily  hydrocortisone sodium succinate Injectable 20 milliGRAM(s) IV Push daily  insulin lispro (HumaLOG) corrective regimen sliding scale   SubCutaneous Before meals and at bedtime  pantoprazole  Injectable 40 milliGRAM(s) IV Push daily  tiotropium 18 MICROgram(s) Capsule 1 Capsule(s) Inhalation daily    MEDICATIONS  (PRN):  ALBUTerol    90 MICROgram(s) HFA Inhaler 2 Puff(s) Inhalation every 6 hours PRN Shortness of Breath  aluminum hydroxide/magnesium hydroxide/simethicone Suspension 30 milliLiter(s) Oral every 4 hours PRN Dyspepsia  dextrose 40% Gel 15 Gram(s) Oral once PRN Blood Glucose LESS THAN 70 milliGRAM(s)/deciliter  diphenhydrAMINE   Injectable 12.5 milliGRAM(s) IV Push every 4 hours PRN Itching  glucagon  Injectable 1 milliGRAM(s) IntraMuscular once PRN Glucose LESS THAN 70 milligrams/deciliter  metoclopramide Injectable 10 milliGRAM(s) IV Push every 6 hours PRN Nausea  morphine  - Injectable 2 milliGRAM(s) IV Push every 4 hours PRN Moderate Pain (4 - 6)  tetracaine/benzocaine/butamben Spray 1 Spray(s) Topical four times a day PRN NGT irritation      LABS:                        10.6   14.43 )-----------( 333      ( 01 Aug 2018 08:54 )             32.8     08-    134<L>  |  94<L>  |  <4<L>  ----------------------------<  226<H>  3.9   |  27  |  0.55    Ca    8.0<L>      01 Aug 2018 07:07  Phos  2.5       Mg     1.6             Urinalysis Basic - ( 01 Aug 2018 22:31 )    Color: Yellow / Appearance: Clear / S.013 / pH: x  Gluc: x / Ketone: Negative  / Bili: Negative / Urobili: Negative   Blood: x / Protein: Trace / Nitrite: Negative   Leuk Esterase: Small / RBC: 0-2 /HPF / WBC 11-25 /HPF   Sq Epi: x / Non Sq Epi: OCC /HPF / Bacteria: Few /HPF            MICROBIOLOGY: Culture - Sputum . (18 @ 22:14)    Gram Stain:   Moderate polymorphonuclear leukocytes per low power field  Rare Squamous epithelial cells per low power field  Moderate Gram Negative Rods per oil power field    Specimen Source: .Sputum Sputum        RADIOLOGY:  < from: Xray Chest 2 Views PA/Lat (18 @ 10:25) >  INTERPRETATION:  CLINICAL INDICATION: Evaluate for pneumonia    TECHNIQUE: Two views of the chest dated 2018    COMPARISON: X-Ray of the chest dated 2018    IMPRESSION:  Right Mediport with tip overlying the SVC. Right lower lobe opacity   representing a combination of atelectasis and pneumonia. Cardiac   silhouette is unremarkable. Visualized osseous structures are   unremarkable. Mildly distended loops of bowel are seen within the upper   abdomen which are of unclear etiology.    < end of copied text >    [ ] Reviewed and interpreted by me    Point of Care Ultrasound Findings:    PFT:    EKG:
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
## 2018-08-02 NOTE — PROGRESS NOTE ADULT - ASSESSMENT
RAYRAY RODRIGUEZ is a 67y Female   s/p abdominoperineal proctectomy for rectal bleeding 7/24    PLAN:  -  diet: CLD  -  fluids: 1/2 MIVF  -  monitor ostomy output  -  drains:  c/t wound vac  -  encourage IS 10x/hr while awake  -  DVT ppx  -  OOB to ambulate TID  -  pain control    -  Dispo:  awaiting return of bowel function  -  Plan d/w attending      [facility]  [id number] 67y Female s/p abdominoperineal proctectomy for rectal bleeding 7/24    PLAN:  -  diet: NPO  -  fluids: 1/2 MIVF  -  NGT  -  monitor ostomy output  -  drains:  c/t wound vac  -  encourage IS 10x/hr while awake  -  DVT ppx  -  OOB to ambulate TID  -  pain control  - CT A/P with PO contrast

## 2018-08-02 NOTE — PROGRESS NOTE ADULT - SUBJECTIVE AND OBJECTIVE BOX
[facility] RED SURGERY DAILY PROGRESS NOTE      SUBJECTIVE:    No acute events overnight.  The patient was seen and examined at bedside this morning.  -  denies N/V with _______  -  ___ flatus  -  ___ bm  -  ostomy with _________  -  pain well controlled overnight  -  incisions without issue    ***  ***  *** [facility] RED SURGERY DAILY PROGRESS NOTE    SUBJECTIVE: Patient nauseated and vomited x 1 bilious content. C/O distension and pain. Stool in ostomy.     Vital Signs Last 24 Hrs  T(C): 37.6 (02 Aug 2018 06:05), Max: 37.7 (01 Aug 2018 22:29)  T(F): 99.6 (02 Aug 2018 06:05), Max: 99.9 (01 Aug 2018 22:29)  HR: 95 (02 Aug 2018 06:05) (70 - 95)  BP: 118/63 (02 Aug 2018 06:05) (95/62 - 127/69)  BP(mean): --  RR: 18 (02 Aug 2018 06:05) (18 - 18)  SpO2: 94% (02 Aug 2018 06:05) (92% - 98%)    I&O's Detail    01 Aug 2018 07:01  -  02 Aug 2018 07:00  --------------------------------------------------------  IN:    dextrose 5% + sodium chloride 0.45%.: 843.5 mL    dextrose 5% + sodium chloride 0.45%.: 400 mL    Oral Fluid: 340 mL    Solution: 100 mL  Total IN: 1683.5 mL    OUT:    Colostomy: 200 mL    Emesis: 75 mL    Voided: 1350 mL  Total OUT: 1625 mL    Total NET: 58.5 mL          Physical Exam:  General Appearance: Appears well, NAD  Abdomen: Soft, distended, RLQ tenderness, dressings clean and dry and intact, ostomy pink and viable  Extremities: Grossly symmetric, SCD's in place     LABS:                        10.0   10.56 )-----------( 408      ( 02 Aug 2018 07:35 )             31.4     08-02    133<L>  |  95<L>  |  <4<L>  ----------------------------<  201<H>  3.7   |  26  |  0.60    Ca    7.9<L>      02 Aug 2018 06:43  Phos  2.5     08-02  Mg     1.7     08-02        Urinalysis Basic - ( 01 Aug 2018 22:31 )    Color: Yellow / Appearance: Clear / S.013 / pH: x  Gluc: x / Ketone: Negative  / Bili: Negative / Urobili: Negative   Blood: x / Protein: Trace / Nitrite: Negative   Leuk Esterase: Small / RBC: 0-2 /HPF / WBC 11-25 /HPF   Sq Epi: x / Non Sq Epi: OCC /HPF / Bacteria: Few /HPF          Gena Ochoa PA-C  p#[id number]

## 2018-08-02 NOTE — PROVIDER CONTACT NOTE (OTHER) - RECOMMENDATIONS
Continue to monitor.
MD Godfrey made aware
MD Godfrey made aware
[facility]. [facility]
MD notified.
Notify MD
Notify Md

## 2018-08-02 NOTE — PROGRESS NOTE ADULT - ATTENDING COMMENTS
as above-Stable at present--off abx--+ ostomy function--new temps--work up in progress (CXR, sputum, blood, urine etc.)-Likely GI (await sputum)  Pulm stable--TBM, asthma, chronic bronchitis-atelectasis due to pain  symbicort/spiriva/supplemental O2, medrol 4mg (currently IV -change to PO once able) -out pt xolair  acapella -incentive spirometry  pain control  ambulate--as per CRS--pain control and motility agents.   Rehab pending return of gI mercedes Allison MD-Pulmonary   [phone]

## 2018-08-02 NOTE — PROVIDER CONTACT NOTE (OTHER) - SITUATION
No IV access at this time, pt an extremely hard stick & has a R chest wall Medi Port that is deaccessed
Pt febrile
Pt febrile 103.1
Pt has temperature
Pt heartrate elevated
Pt wound vac blockage
Patient urinated 300ml at 1450. Patient attempted to urinate but unable to do so. Bladder scan performed and 113ml noted. Patient did not have IV access majority of yesterday.

## 2018-08-02 NOTE — PROGRESS NOTE ADULT - SUBJECTIVE AND OBJECTIVE BOX
Consult:  · Requested by Name:	Terrell Arango	  · Date/Time:	02-Aug-2018 	  · Reason for Referral/Consultation:	Perioperative management of cardiac conditions	      · Subjective and Objective: 	  **********              CARDIOLOGY CONSULT PROGRESS NOTE              ************  ============================================================  CHIEF COMPLAINT/REASON FOR CONSULT:  Patient is a 67y old  Female who presents with a chief complaint of rectal bleeding (2018 10:00)      HISTORY OF PRESENT ILLNESS:  67yFemale with a history of Remote Asthma, DVT, TIA, COPD, Moderate AI, Normal LVEF, pAF on Digoxin/Eliquis prior to admission, Colorectal CA p/w bleeding s/p SCC resection POD#1  - presenting for SCC resection.  No CP/SOB/Palps.         ============================================================  24 hr events  - BP controlled; HR controlled  - Continues on IV Dig (level low)  - NG Tube in place  - states "improving"  ============================================================      Allergies    ampicillin (Short breath)  aspirin (Short breath)  Avelox (Short breath; Pruritus)  codeine (Short breath)  Dilaudid (Short breath)  iodine (Short breath; Swelling)  penicillin (Short breath)  shellfish (Anaphylaxis)  tetanus toxoid (Short breath)  Valium (Short breath)    Intolerances    	    MEDICATIONS:  heparin  Injectable 5000 Unit(s) SubCutaneous every 8 hours    aztreonam  IVPB 2000 milliGRAM(s) IV Intermittent once  clindamycin IVPB 900 milliGRAM(s) IV Intermittent once    ALBUTerol    90 MICROgram(s) HFA Inhaler 2 Puff(s) Inhalation every 6 hours PRN  ALBUTerol/ipratropium for Nebulization 3 milliLiter(s) Nebulizer every 6 hours  diphenhydrAMINE   Injectable 12.5 milliGRAM(s) IV Push every 4 hours PRN  tiotropium 18 MICROgram(s) Capsule 1 Capsule(s) Inhalation daily    morphine PCA (5 mG/mL) 30 milliLiter(s) PCA Continuous PCA Continuous  morphine PCA (5 mG/mL) Rescue Clinician Bolus 2.5 milliGRAM(s) IV Push every 15 minutes PRN    pantoprazole  Injectable 40 milliGRAM(s) IV Push daily    dextrose 40% Gel 15 Gram(s) Oral once PRN  dextrose 50% Injectable 12.5 Gram(s) IV Push once  dextrose 50% Injectable 25 Gram(s) IV Push once  dextrose 50% Injectable 25 Gram(s) IV Push once  glucagon  Injectable 1 milliGRAM(s) IntraMuscular once PRN  insulin lispro (HumaLOG) corrective regimen sliding scale   SubCutaneous every 6 hours    dextrose 5% + sodium chloride 0.45%. 1000 milliLiter(s) IV Continuous <Continuous>  dextrose 5%. 1000 milliLiter(s) IV Continuous <Continuous>      PAST MEDICAL & SURGICAL HISTORY:  TIA (transient ischemic attack): multiple, last 5 years ago - presents as right-sided weakness  DVT (deep venous thrombosis): 15-20 years ago, took coumadin  Seizure: x 1 18  Rectal bleeding  Colorectal cancer: 2018- last treatment , chemo and radiation  Tracheobronchomalacia: diagnosed , s/p bronchial thermoplasty  (Dr Zapien); recent bronchoscopy 2018 revealed no evidence of tracheobronchomalacia in trachea or bronchial tubes  Asthma  Pelvic fracture  Aortic insufficiency: moderate AR on echo 5/3/2018  Adrenal insufficiency: Medrol daily for over 50 years  COPD (chronic obstructive pulmonary disease)  Diabetes: Type 2  Atrial fibrillation: paroxysmal, on eliquis  Rectal bleeding: exam under anesthesia ([facility]) 2018  S/P bronchoscopy: 2018 - [facility] (Dr Zapien) no evidence of tracheobronchomalacia in trachea or bronchial tubes  History of tracheomalacia:  - attempted tracheal stenting ([facility])- course complicated by obstruction, respiratory failure, multiple CPR attempts -  stent discontinued; 10/20/2016 Tracheobronchoplasty (Prolene Mesh) performed at [facility] by Dr Zaipen  H/O pelvic surgery: 5 years ago - s/p fracture  Exostosis of orbit, 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  History of sinus surgery: multiple sinus surgeries  H/O total knee replacement, bilateral: 5 years ago  History of partial hysterectomy: 30 years ago - fibroids      FAMILY HISTORY:  Family history of diabetes mellitus type II  Family history of breast cancer (Sibling)  Family history of asthma    NC -   Mother - HTN  Father - DM    SOCIAL HISTORY:    [ x] Non-smoker  [x] No Illicit Drug Use  [ x] No Excess EtOH Use      REVIEW OF SYSTEMS: (Unless + Before Symptom, it is negative)  Constitutional: No Fever, +Fatigue, Weight Changes  Eyes: No Recent Vision Changes, Eye Pain  ENT: No Congestion, Sore Throat  Endocrine: No Excess Sweating, Temperature Intolerance  Cardiovascular: No Chest Pain, Palpitations, Shortness of Breath, Pre-syncope, Syncope, LE Edema  Respiratory: No Cough, Congestion, Wheezing  Gastrointestinal: +Abdominal Pain, Nausea, Vomiting  Genitourinary: No dysuria, hematuria  Musculoskeletal: No Joint Pain, Swelling  Neurologic: No headaches, Imbalance, Weakness  Skin: No rashes, hematoma, purprura    ================================    PHYSICAL EXAM:  Vital Signs Last 24 Hrs  T(C): 36.8 (01 Aug 2018 16:33), Max: 39.5 (2018 21:00)  T(F): 98.2 (01 Aug 2018 16:33), Max: 103.1 (2018 21:00)  HR: 70 (01 Aug 2018 16:33) (70 - 101)  BP: 117/64 (01 Aug 2018 16:33) (95/62 - 143/83)  BP(mean): --  RR: 18 (01 Aug 2018 16:33) (10 - 18)  SpO2: 96% (01 Aug 2018 16:33) (94% - 98%)    Appearance: Normal; NAD	  HEENT:   Normal oral mucosa, EOMI	  Lymphatic: No lymphadenopathy  Cardiovascular: Normal S1 S2, No JVD, No murmurs, No edema  Respiratory: Lungs clear to auscultation, no use of accessory muscles	  Psychiatry: A & O x 3, Mood & affect appropriate  Gastrointestinal:  Soft, +Distended +Less Tender +Ostomy  Skin: No rashes, No ecchymoses, No cyanosis	  Neurologic: Non-focal, No Focal Deficits  Extremities: Normal range of motion, No clubbing, cyanosis or edema  Vascular: Peripheral pulses palpable 2+ bilaterally, no prominent varicosities    ============================    LABS:	   Labs [id number]    CBC Full  -  ( 2018 04:32 )  WBC Count : 14.8 K/uL  Hemoglobin : 11.4 g/dL  Hematocrit : 37.5 %  Platelet Count - Automated : 201 K/uL  Mean Cell Volume : 76.1 fl  Mean Cell Hemoglobin : 23.1 pg  Mean Cell Hemoglobin Concentration : 30.3 gm/dL  Auto Neutrophil # : x  Auto Lymphocyte # : x  Auto Monocyte # : x  Auto Eosinophil # : x  Auto Basophil # : x  Auto Neutrophil % : x  Auto Lymphocyte % : x  Auto Monocyte % : x  Auto Eosinophil % : x  Auto Basophil % : x    07-25    142  |  106  |  10  ----------------------------<  191<H>  4.6   |  26  |  0.77  07-25    140  |  104  |  9   ----------------------------<  201<H>  4.4   |  25  |  0.73    Ca    8.2<L>      2018 04:32  Ca    8.2<L>      2018 00:16  Phos  4.2     07-25  Phos  4.1     07-25  Mg     2.1     07-25  Mg     2.0     07-25          =========================================================================  CXR:  < from: Xray Chest 1 View- PORTABLE-Urgent (18 @ 16:02) >    EXAM:  XR CHEST PORTABLE URGENT 1V                          PROCEDURE DATE:  2018          INTERPRETATION:  Portable Chest X-Ray dated 2018 4:02 PM    Indication: s/p bronch    Prior studies: 2018    An AP portable view of the chest revealed cardiomegaly. Trachea midline.   No pneumothorax seen. Trace right pleural effusion. Increased markings in   the left lower lobe. Right Port-A-Cath with the tip in the right atrium.    IMPRESSION:  No pneumothorax seen. Trace right pleural effusion.             "Thank you for the opportunity to participate in the care of this   patient."    < end of copied text >      ECG:  	    PREVIOUS DIAGNOSTIC TESTING:    [X] Echocardiogram:  < from: Echocardiogram (18 @ 10:55) >      INTERPRETATION:  Patient Height: 170.2 cm  Patient Weight: 73.0 kg  Heart Rate: 70 bpm  Systolic Pressure: 132 mmHg  Diastolic Pressure: 70 mmHg  BSA: 1.8 m^2  Interpretation Summary  The left atrial size is normal. Right atrial size is normal.There is mild   aortic valve thickening. The aortic valve is trileaflet. There is   moderate   aortic regurgitation. No hemodynamically significantvalvular aortic   stenosis.    There is mild mitral valve thickening. There is mild mitral annular   calcification. There is trace mitral regurgitation.  Structurally normal   tricuspid valve. There is trace tricuspid regurgitation.There was   insufficient   TR detected from which to calculate pulmonary artery systolic pressure.    The   pulmonic valve is not well visualized. No pulmonic regurgitation   noted.The   right ventricle is normal in size and function.There is moderate   concentric   left ventricular hypertrophy. The left ventricular wall motion is   normal. The   left ventricular ejection fraction is 63%.  No aortic root   dilatation.There is   no pericardial effusion.When compared to prior study performed on   10/21/16,   there isno significant change.  Procedure Details  A complete two-dimensional transthoracic echocardiogram was performed (2D,  M-mode, spectral and color flow doppler).  Study Quality: Fair.  Left Ventricle  There is moderate concentric left ventricular hypertrophy.  The left ventricular wall motion is normal.  The left ventricular ejection fraction is 63%.  Left Atrium  The left atrial size is normal.  Right Atrium  Right atrial size is normal.  Right Ventricle  The right ventricle is normal in size andfunction.  Aortic Valve  There is mild aortic valve thickening.  The aortic valve is trileaflet.  There is moderate aortic regurgitation.  No hemodynamically significant valvular aortic stenosis.  Mitral Valve  There is mild mitral valve thickening.  There is mild mitral annular calcification.  There is trace mitral regurgitation.  Tricuspid Valve  Structurally normal tricuspid valve.  There was insufficient TR detected from which to calculate pulmonary   artery  systolic pressure.  There is trace tricuspid regurgitation.  Pulmonic Valve  The pulmonic valve is not well visualized.  No pulmonic regurgitation noted.  Arteries and Venous System  No aortic root dilatation.  The inferior vena cava is normal in size (<2.1 cm) with normal inspiratory  collapse (>50%) consistent with normal right atrial pressure.  Pericardium / Pleura  There is no pericardial effusion.  Doppler Measurements & Calculations  MV E point: 67.9 cm/sec  MV A point: 91.3 cm/sec  MV E/A: 0.7  MV dec time: 0.2 sec  Ao V2 max: 172.4 cm/sec  Ao max P.9 mmHg  Ao max PG (full): 5.5 mmHg  SUSANA(V,A): 2.4 cm^2  SUSANA(V,D): 2.4 cm^2  AI max lynette: 399.1 cm/sec  AI max P.7 mmHg  AI dec slope: 275.9 cm/sec^2  AI P1/2t: 423.7 msec  LV max P.4 mmHg  LV V1 max: 126.5 cm/sec  MMode 2D Measurements & Calculations  IVSd: 1.3 cm  LVIDd: 4.2 cm  LVIDs: 2.9 cm  LVPWd: 1.1 cm  IVS/LVPW: 1.2  FS: 31.3 %  EDV(Teich): 80.1 ml  ESV(Teich): 32.5 ml  LV mass(C)d: 180.5 grams  LV mass(C)dI: 97.9 grams/m^2  SI(cubed): 27.8 ml/m^2  Ao root diam: 3.3 cm  Ao root area: 8.8 cm^2  LA dimension: 4.6 cm  LA/Ao: 1.4  LVOT diam: 2.0 cm  LVOT area: 3.2 cm^2  LVOT area (M): 3.8 cm^2  LVLd ap4: 7.6 cm  EDV(MOD-sp4): 70 ml  LVLs ap4: 6.6 cm  ESV(MOD-sp4): 22 ml  EF(MOD-sp4): 68.6 %  LVLd ap2: 7.4 cm  EDV(MOD-sp2): 43 ml  LVLs ap2: 5.9 cm  ESV(MOD-sp2): 16 ml  EF(MOD-sp2): 62.8 %  SV(MOD-sp4): 48 ml  SI(MOD-sp4): 26.0 ml/m^2  SV(MOD-sp2): 27 ml  SI(MOD-sp2): 14.6 ml/m^2  Interpreting Physician:OMAR Dias electronically signed on   2018 12:45:03        =========================================================================  ASSESSMENT:    67yFemale with a history of Remote Asthma, DVT, TIA, COPD, Moderate AI, Normal LVEF, pAF on Digoxin/Eliquis prior to admission, Colorectal CA p/w bleeding s/p SCC resection POD#1  - presenting for SCC resection.    Recs  - Resume Eliquis when able  - Continue Digoxin  - Hold Diovan  - Careful with fluid load given AI  - Thank you for this consult.  - Will Follow      Please call with questions.     Baron Tristan MD, [facility]  [phone]

## 2018-08-02 NOTE — PROGRESS NOTE ADULT - ASSESSMENT
67 yr F with PMH of COPD/asthma, tracheobronchomalacia s/p tracheo-bronchoplasty in 10/16, Afib on Eliquis , Adrenal Insufficieny on steroids,  DM2, HTN, Squamous cell CA of the anus on chemo/XRT, now s/p elective abdominoperineal proctectomy for recurrent exophytic anal cancer on 7/24/18    1. Cough:  - Currently stable pulm status  -  Sputum Cx--pending  - In the past her SCxs/ BAL cxs have shown Grp B strept, Acinetobacter, Achromobacter, Pasteurella and Serratia  -  Off Antibiotic regimen of Aztreonam and Clindamycin as of 7/27   - On nebulized hypertonic saline Q12H (preceded by Duonebs) to assist with mobilization of secretions  - Cont close monitoring of resp symptoms. Optimize pain to prevent resp splinting  - Incentive spirometry/ acapella use/ OBC and early mobilization    2. COPD/ Asthma/ Tracheomalacia s/p tracheobronchoplasty- on Duonebs Q6H  - continue Symbicort BID (takes Breo ellipta at home which is non formularly)  - continue Spiriva daily  - Once tolerating PO intake, can restart home dose of Singulair daily  - Supplemental O2 to keep sats > 90 %    SEE below as well--NGT out 7/31; 8/1-fever--cxr--c/w prior films-sputum pending

## 2018-08-03 LAB
-  AMIKACIN: SIGNIFICANT CHANGE UP
-  AZTREONAM: SIGNIFICANT CHANGE UP
-  CEFEPIME: SIGNIFICANT CHANGE UP
-  CEFTAZIDIME: SIGNIFICANT CHANGE UP
-  CIPROFLOXACIN: SIGNIFICANT CHANGE UP
-  GENTAMICIN: SIGNIFICANT CHANGE UP
-  IMIPENEM: SIGNIFICANT CHANGE UP
-  LEVOFLOXACIN: SIGNIFICANT CHANGE UP
-  MEROPENEM: SIGNIFICANT CHANGE UP
-  PIPERACILLIN/TAZOBACTAM: SIGNIFICANT CHANGE UP
-  TOBRAMYCIN: SIGNIFICANT CHANGE UP
ANION GAP SERPL CALC-SCNC: 11 MMOL/L — SIGNIFICANT CHANGE UP (ref 5–17)
APPEARANCE UR: ABNORMAL
BILIRUB UR-MCNC: NEGATIVE — SIGNIFICANT CHANGE UP
BUN SERPL-MCNC: <4 MG/DL — LOW (ref 7–23)
CA-I BLD-SCNC: 1.08 MMOL/L — LOW (ref 1.12–1.3)
CALCIUM SERPL-MCNC: 7.8 MG/DL — LOW (ref 8.4–10.5)
CHLORIDE SERPL-SCNC: 96 MMOL/L — SIGNIFICANT CHANGE UP (ref 96–108)
CO2 SERPL-SCNC: 27 MMOL/L — SIGNIFICANT CHANGE UP (ref 22–31)
COLOR SPEC: YELLOW — SIGNIFICANT CHANGE UP
COMMENT - URINE: SIGNIFICANT CHANGE UP
CREAT SERPL-MCNC: 0.62 MG/DL — SIGNIFICANT CHANGE UP (ref 0.5–1.3)
CULTURE RESULTS: SIGNIFICANT CHANGE UP
CULTURE RESULTS: SIGNIFICANT CHANGE UP
DIFF PNL FLD: ABNORMAL
EPI CELLS # UR: SIGNIFICANT CHANGE UP /HPF
GLUCOSE BLDC GLUCOMTR-MCNC: 139 MG/DL — HIGH (ref 70–99)
GLUCOSE BLDC GLUCOMTR-MCNC: 165 MG/DL — HIGH (ref 70–99)
GLUCOSE BLDC GLUCOMTR-MCNC: 168 MG/DL — HIGH (ref 70–99)
GLUCOSE BLDC GLUCOMTR-MCNC: 203 MG/DL — HIGH (ref 70–99)
GLUCOSE BLDC GLUCOMTR-MCNC: 242 MG/DL — HIGH (ref 70–99)
GLUCOSE SERPL-MCNC: 170 MG/DL — HIGH (ref 70–99)
GLUCOSE UR QL: 1000 MG/DL
HCT VFR BLD CALC: 30.8 % — LOW (ref 34.5–45)
HGB BLD-MCNC: 10 G/DL — LOW (ref 11.5–15.5)
KETONES UR-MCNC: ABNORMAL
LEUKOCYTE ESTERASE UR-ACNC: ABNORMAL
MAGNESIUM SERPL-MCNC: 2 MG/DL — SIGNIFICANT CHANGE UP (ref 1.6–2.6)
MCHC RBC-ENTMCNC: 23.1 PG — LOW (ref 27–34)
MCHC RBC-ENTMCNC: 32.5 GM/DL — SIGNIFICANT CHANGE UP (ref 32–36)
MCV RBC AUTO: 71.3 FL — LOW (ref 80–100)
METHOD TYPE: SIGNIFICANT CHANGE UP
NITRITE UR-MCNC: NEGATIVE — SIGNIFICANT CHANGE UP
ORGANISM # SPEC MICROSCOPIC CNT: SIGNIFICANT CHANGE UP
ORGANISM # SPEC MICROSCOPIC CNT: SIGNIFICANT CHANGE UP
PH UR: 6 — SIGNIFICANT CHANGE UP (ref 5–8)
PHOSPHATE SERPL-MCNC: 2.6 MG/DL — SIGNIFICANT CHANGE UP (ref 2.5–4.5)
PLATELET # BLD AUTO: 401 K/UL — HIGH (ref 150–400)
POTASSIUM SERPL-MCNC: 3.3 MMOL/L — LOW (ref 3.5–5.3)
POTASSIUM SERPL-SCNC: 3.3 MMOL/L — LOW (ref 3.5–5.3)
PROT UR-MCNC: 30 MG/DL
RBC # BLD: 4.32 M/UL — SIGNIFICANT CHANGE UP (ref 3.8–5.2)
RBC # FLD: 15.3 % — HIGH (ref 10.3–14.5)
RBC CASTS # UR COMP ASSIST: ABNORMAL /HPF (ref 0–2)
SODIUM SERPL-SCNC: 134 MMOL/L — LOW (ref 135–145)
SP GR SPEC: 1.02 — SIGNIFICANT CHANGE UP (ref 1.01–1.02)
SPECIMEN SOURCE: SIGNIFICANT CHANGE UP
SPECIMEN SOURCE: SIGNIFICANT CHANGE UP
UROBILINOGEN FLD QL: NEGATIVE — SIGNIFICANT CHANGE UP
WBC # BLD: 10.38 K/UL — SIGNIFICANT CHANGE UP (ref 3.8–10.5)
WBC # FLD AUTO: 10.38 K/UL — SIGNIFICANT CHANGE UP (ref 3.8–10.5)
WBC UR QL: >50 /HPF (ref 0–5)

## 2018-08-03 PROCEDURE — 99232 SBSQ HOSP IP/OBS MODERATE 35: CPT

## 2018-08-03 PROCEDURE — 99233 SBSQ HOSP IP/OBS HIGH 50: CPT

## 2018-08-03 RX ORDER — CEFTRIAXONE 500 MG/1
1 INJECTION, POWDER, FOR SOLUTION INTRAMUSCULAR; INTRAVENOUS EVERY 24 HOURS
Qty: 0 | Refills: 0 | Status: DISCONTINUED | OUTPATIENT
Start: 2018-08-03 | End: 2018-08-08

## 2018-08-03 RX ORDER — POTASSIUM CHLORIDE 20 MEQ
10 PACKET (EA) ORAL
Qty: 0 | Refills: 0 | Status: COMPLETED | OUTPATIENT
Start: 2018-08-03 | End: 2018-08-03

## 2018-08-03 RX ADMIN — BUDESONIDE AND FORMOTEROL FUMARATE DIHYDRATE 2 PUFF(S): 160; 4.5 AEROSOL RESPIRATORY (INHALATION) at 18:57

## 2018-08-03 RX ADMIN — Medication 100 MILLIEQUIVALENT(S): at 21:58

## 2018-08-03 RX ADMIN — PANTOPRAZOLE SODIUM 40 MILLIGRAM(S): 20 TABLET, DELAYED RELEASE ORAL at 12:41

## 2018-08-03 RX ADMIN — MORPHINE SULFATE 2 MILLIGRAM(S): 50 CAPSULE, EXTENDED RELEASE ORAL at 15:15

## 2018-08-03 RX ADMIN — Medication 3 MILLILITER(S): at 18:58

## 2018-08-03 RX ADMIN — Medication 2: at 06:32

## 2018-08-03 RX ADMIN — Medication 20 MILLIGRAM(S): at 06:24

## 2018-08-03 RX ADMIN — Medication 3 MILLILITER(S): at 12:42

## 2018-08-03 RX ADMIN — MORPHINE SULFATE 2 MILLIGRAM(S): 50 CAPSULE, EXTENDED RELEASE ORAL at 21:50

## 2018-08-03 RX ADMIN — MORPHINE SULFATE 2 MILLIGRAM(S): 50 CAPSULE, EXTENDED RELEASE ORAL at 08:12

## 2018-08-03 RX ADMIN — Medication 0.25 MILLIGRAM(S): at 14:08

## 2018-08-03 RX ADMIN — Medication 100 MILLIEQUIVALENT(S): at 16:46

## 2018-08-03 RX ADMIN — DEXTROSE MONOHYDRATE, SODIUM CHLORIDE, AND POTASSIUM CHLORIDE 75 MILLILITER(S): 50; .745; 4.5 INJECTION, SOLUTION INTRAVENOUS at 05:21

## 2018-08-03 RX ADMIN — Medication 4: at 12:43

## 2018-08-03 RX ADMIN — Medication 2: at 18:56

## 2018-08-03 RX ADMIN — MORPHINE SULFATE 2 MILLIGRAM(S): 50 CAPSULE, EXTENDED RELEASE ORAL at 22:05

## 2018-08-03 RX ADMIN — TIOTROPIUM BROMIDE 1 CAPSULE(S): 18 CAPSULE ORAL; RESPIRATORY (INHALATION) at 12:42

## 2018-08-03 RX ADMIN — ENOXAPARIN SODIUM 40 MILLIGRAM(S): 100 INJECTION SUBCUTANEOUS at 12:41

## 2018-08-03 RX ADMIN — Medication 3 MILLILITER(S): at 00:40

## 2018-08-03 RX ADMIN — Medication 3 MILLILITER(S): at 06:24

## 2018-08-03 RX ADMIN — CHLORHEXIDINE GLUCONATE 1 APPLICATION(S): 213 SOLUTION TOPICAL at 09:22

## 2018-08-03 RX ADMIN — Medication 100 MILLIEQUIVALENT(S): at 20:51

## 2018-08-03 RX ADMIN — CEFTRIAXONE 100 GRAM(S): 500 INJECTION, POWDER, FOR SOLUTION INTRAMUSCULAR; INTRAVENOUS at 18:56

## 2018-08-03 RX ADMIN — MORPHINE SULFATE 2 MILLIGRAM(S): 50 CAPSULE, EXTENDED RELEASE ORAL at 08:15

## 2018-08-03 RX ADMIN — MORPHINE SULFATE 2 MILLIGRAM(S): 50 CAPSULE, EXTENDED RELEASE ORAL at 14:54

## 2018-08-03 RX ADMIN — Medication 4: at 00:40

## 2018-08-03 RX ADMIN — BUDESONIDE AND FORMOTEROL FUMARATE DIHYDRATE 2 PUFF(S): 160; 4.5 AEROSOL RESPIRATORY (INHALATION) at 06:24

## 2018-08-03 NOTE — PROGRESS NOTE ADULT - SUBJECTIVE AND OBJECTIVE BOX
CHIEF COMPLAINT: f/up for sob, asthma, TBM, chronic bronchitis, fever--better w/ NG tube; limited sob-reduced, mild cough--less abdom pain    Interval Events: NG tube replaced    REVIEW OF SYSTEMS:  Constitutional: No fevers or chills. No weight loss. + fatigue or generalized malaise.  Eyes: No itching or discharge from the eyes  ENT: No ear pain. No ear discharge. No nasal congestion. No post nasal drip. No epistaxis. No throat pain. No sore throat. No difficulty swallowing.   CV: No chest pain. No palpitations. No lightheadedness or dizziness.   Resp: No dyspnea at rest. + dyspnea on exertion. No orthopnea. No wheezing. + cough. No stridor. No sputum production. No chest pain with respiration.  GI: No nausea. No vomiting. No diarrhea.  MSK: No joint pain or pain in any extremities  Integumentary: No skin lesions. No pedal edema.  Neurological: No gross motor weakness. No sensory changes.  [+ ] All other systems negative  [ ] Unable to assess ROS because ________    OBJECTIVE:  ICU Vital Signs Last 24 Hrs  T(C): 36.9 (03 Aug 2018 04:53), Max: 39.5 (02 Aug 2018 21:02)  T(F): 98.5 (03 Aug 2018 04:53), Max: 103.1 (02 Aug 2018 21:02)  HR: 84 (03 Aug 2018 04:53) (73 - 95)  BP: 102/63 (03 Aug 2018 04:53) (102/63 - 162/75)  BP(mean): --  ABP: --  ABP(mean): --  RR: 18 (03 Aug 2018 04:53) (18 - 18)  SpO2: 98% (03 Aug 2018 04:53) (94% - 98%)         @ 07:  -  02 @ 07:00  --------------------------------------------------------  IN: 1683.5 mL / OUT: 1625 mL / NET: 58.5 mL     @ 07:  -  08-03 @ 05:09  --------------------------------------------------------  IN: 900 mL / OUT: 1825 mL / NET: -925 mL      CAPILLARY BLOOD GLUCOSE      POCT Blood Glucose.: 203 mg/dL (03 Aug 2018 00:28)      PHYSICAL EXAM: NAD in bed-NGT in place  General: Awake, alert, oriented X 3.   HEENT: Atraumatic, normocephalic.                 Mallampatti Grade 2                No nasal congestion.                No tonsillar or pharyngeal exudates.  Lymph Nodes: No palpable lymphadenopathy  Neck: No JVD. No carotid bruit.   Respiratory: Normal chest expansion                         Normal percussion                         Normal and equal air entry                         No wheeze, rhonchi or rales.  Cardiovascular: S1 S2 normal. No murmurs, rubs or gallops.   Abdomen: Soft, non-tender, non-distended. No organomegaly. NABS  Extremities: Warm to touch. Peripheral pulse palpable. No pedal edema.   Skin: No rashes or skin lesions  Neurological: Motor and sensory examination equal and normal in all four extremities.  Psychiatry: Appropriate mood and affect.    HOSPITAL MEDICATIONS:  MEDICATIONS  (STANDING):  ALBUTerol/ipratropium for Nebulization 3 milliLiter(s) Nebulizer every 6 hours  buDESOnide 160 MICROgram(s)/formoterol 4.5 MICROgram(s) Inhaler 2 Puff(s) Inhalation two times a day  chlorhexidine 4% Liquid 1 Application(s) Topical <User Schedule>  dextrose 5% + sodium chloride 0.45% with potassium chloride 20 mEq/L 1000 milliLiter(s) (75 mL/Hr) IV Continuous <Continuous>  dextrose 5%. 1000 milliLiter(s) (50 mL/Hr) IV Continuous <Continuous>  dextrose 50% Injectable 12.5 Gram(s) IV Push once  dextrose 50% Injectable 25 Gram(s) IV Push once  dextrose 50% Injectable 25 Gram(s) IV Push once  digoxin  Injectable 0.25 milliGRAM(s) IV Push daily  enoxaparin Injectable 40 milliGRAM(s) SubCutaneous daily  hydrocortisone sodium succinate Injectable 20 milliGRAM(s) IV Push daily  insulin lispro (HumaLOG) corrective regimen sliding scale   SubCutaneous every 6 hours  ondansetron Injectable 4 milliGRAM(s) IV Push once  pantoprazole  Injectable 40 milliGRAM(s) IV Push daily  tiotropium 18 MICROgram(s) Capsule 1 Capsule(s) Inhalation daily    MEDICATIONS  (PRN):  ALBUTerol    90 MICROgram(s) HFA Inhaler 2 Puff(s) Inhalation every 6 hours PRN Shortness of Breath  dextrose 40% Gel 15 Gram(s) Oral once PRN Blood Glucose LESS THAN 70 milliGRAM(s)/deciliter  diphenhydrAMINE   Injectable 12.5 milliGRAM(s) IV Push every 4 hours PRN Itching  glucagon  Injectable 1 milliGRAM(s) IntraMuscular once PRN Glucose LESS THAN 70 milligrams/deciliter  metoclopramide Injectable 10 milliGRAM(s) IV Push every 6 hours PRN Nausea  morphine  - Injectable 2 milliGRAM(s) IV Push every 4 hours PRN Moderate Pain (4 - 6)  tetracaine/benzocaine/butamben Spray 1 Spray(s) Topical four times a day PRN NGT irritation      LABS:                        10.5   11.9  )-----------( 440      ( 02 Aug 2018 22:55 )             33.4     08-02    133<L>  |  95<L>  |  <4<L>  ----------------------------<  201<H>  3.7   |  26  |  0.60    Ca    7.9<L>      02 Aug 2018 06:43  Phos  2.5     08-02  Mg     1.7     08-02        Urinalysis Basic - ( 01 Aug 2018 22:31 )    Color: Yellow / Appearance: Clear / S.013 / pH: x  Gluc: x / Ketone: Negative  / Bili: Negative / Urobili: Negative   Blood: x / Protein: Trace / Nitrite: Negative   Leuk Esterase: Small / RBC: 0-2 /HPF / WBC 11-25 /HPF   Sq Epi: x / Non Sq Epi: OCC /HPF / Bacteria: Few /HPF            MICROBIOLOGY: Culture - Sputum . (18 @ 22:14)    Gram Stain:   Moderate polymorphonuclear leukocytes per low power field  Rare Squamous epithelial cells per low power field  Moderate Gram Negative Rods per oil power field    Specimen Source: .Sputum Sputum    Culture Results:   Moderate Pseudomonas aeruginosa        RADIOLOGY:  [ ] Reviewed and interpreted by me    Point of Care Ultrasound Findings:    PFT:    EKG:

## 2018-08-03 NOTE — PROGRESS NOTE ADULT - ASSESSMENT
67 yr F with PMH of COPD/asthma, tracheobronchomalacia s/p tracheo-bronchoplasty in 10/16, Afib on Eliquis , Adrenal Insufficieny on steroids,  DM2, HTN, Squamous cell CA of the anus on chemo/XRT, now s/p elective abdominoperineal proctectomy for recurrent exophytic anal cancer on 7/24/18    1. Cough:  - Currently stable pulm status  -  Sputum Cx--pseudomonas aereginosa    -  Off Antibiotic regimen of Aztreonam and Clindamycin as of 7/27   - On nebulized hypertonic saline Q12H (preceded by Duonebs) to assist with mobilization of secretions  - Cont close monitoring of resp symptoms. Optimize pain to prevent resp splinting  - Incentive spirometry/ acapella use/ OBC and continue mobilization    2. COPD/ Asthma/ Tracheomalacia s/p tracheobronchoplasty- on Duonebs Q6H  - continue Symbicort BID (takes Breo ellipta at home which is non formularly)  - continue Spiriva daily  - Once tolerating PO intake, can restart home dose of Singulair daily  - Supplemental O2 to keep sats > 90 %    SEE below as well--NGT out 7/31; 8/1-fever--cxr--c/w prior films-sputum c/w pseudomonas Aereginosa

## 2018-08-03 NOTE — PROGRESS NOTE ADULT - SUBJECTIVE AND OBJECTIVE BOX
[facility] RED SURGERY DAILY PROGRESS NOTE    Subjective:  Patient seen and examined on morning rounds.   Stable with no overnight events.   Denies CP/ SOB/ Palpatations.   Denies N/V.   Reports no flatus and No BM.     MEDICATIONS  (STANDING):  ALBUTerol/ipratropium for Nebulization 3 milliLiter(s) Nebulizer every 6 hours  buDESOnide 160 MICROgram(s)/formoterol 4.5 MICROgram(s) Inhaler 2 Puff(s) Inhalation two times a day  chlorhexidine 4% Liquid 1 Application(s) Topical <User Schedule>  dextrose 5% + sodium chloride 0.45% with potassium chloride 20 mEq/L 1000 milliLiter(s) (75 mL/Hr) IV Continuous <Continuous>  dextrose 5%. 1000 milliLiter(s) (50 mL/Hr) IV Continuous <Continuous>  dextrose 50% Injectable 12.5 Gram(s) IV Push once  dextrose 50% Injectable 25 Gram(s) IV Push once  dextrose 50% Injectable 25 Gram(s) IV Push once  digoxin  Injectable 0.25 milliGRAM(s) IV Push daily  enoxaparin Injectable 40 milliGRAM(s) SubCutaneous daily  hydrocortisone sodium succinate Injectable 20 milliGRAM(s) IV Push daily  insulin lispro (HumaLOG) corrective regimen sliding scale   SubCutaneous every 6 hours  ondansetron Injectable 4 milliGRAM(s) IV Push once  pantoprazole  Injectable 40 milliGRAM(s) IV Push daily  tiotropium 18 MICROgram(s) Capsule 1 Capsule(s) Inhalation daily    MEDICATIONS  (PRN):  ALBUTerol    90 MICROgram(s) HFA Inhaler 2 Puff(s) Inhalation every 6 hours PRN Shortness of Breath  dextrose 40% Gel 15 Gram(s) Oral once PRN Blood Glucose LESS THAN 70 milliGRAM(s)/deciliter  diphenhydrAMINE   Injectable 12.5 milliGRAM(s) IV Push every 4 hours PRN Itching  glucagon  Injectable 1 milliGRAM(s) IntraMuscular once PRN Glucose LESS THAN 70 milligrams/deciliter  metoclopramide Injectable 10 milliGRAM(s) IV Push every 6 hours PRN Nausea  morphine  - Injectable 2 milliGRAM(s) IV Push every 4 hours PRN Moderate Pain (4 - 6)  tetracaine/benzocaine/butamben Spray 1 Spray(s) Topical four times a day PRN NGT irritation      Vital Signs Last 24 Hrs  T(C): 37.7 (03 Aug 2018 02:06), Max: 39.5 (02 Aug 2018 21:02)  T(F): 99.8 (03 Aug 2018 02:06), Max: 103.1 (02 Aug 2018 21:02)  HR: 90 (03 Aug 2018 02:06) (73 - 95)  BP: 144/77 (03 Aug 2018 00:33) (112/71 - 162/75)  BP(mean): --  RR: 18 (03 Aug 2018 00:33) (18 - 18)  SpO2: 96% (03 Aug 2018 00:33) (94% - 96%)    I&O's Detail    01 Aug 2018 07:01  -  02 Aug 2018 07:00  --------------------------------------------------------  IN:    dextrose 5% + sodium chloride 0.45%.: 843.5 mL    dextrose 5% + sodium chloride 0.45%.: 400 mL    Oral Fluid: 340 mL    Solution: 100 mL  Total IN: 1683.5 mL    OUT:    Colostomy: 200 mL    Emesis: 75 mL    Voided: 1350 mL  Total OUT: 1625 mL    Total NET: 58.5 mL      02 Aug 2018 07:01  -  03 Aug 2018 03:14  --------------------------------------------------------  IN:    dextrose 5% + sodium chloride 0.45% with potassium chloride 20 mEq/L: 800 mL  Total IN: 800 mL    OUT:    Colostomy: 425 mL    Nasoenteral Tube: 1000 mL    Voided: 400 mL  Total OUT: 1825 mL    Total NET: -1025 mL          Daily     Daily     LABS:                        10.5   11.9  )-----------( 440      ( 02 Aug 2018 22:55 )             33.4     08-02    133<L>  |  95<L>  |  <4<L>  ----------------------------<  201<H>  3.7   |  26  |  0.60    Ca    7.9<L>      02 Aug 2018 06:43  Phos  2.5     08-  Mg     1.7     08-        Urinalysis Basic - ( 01 Aug 2018 22:31 )    Color: Yellow / Appearance: Clear / S.013 / pH: x  Gluc: x / Ketone: Negative  / Bili: Negative / Urobili: Negative   Blood: x / Protein: Trace / Nitrite: Negative   Leuk Esterase: Small / RBC: 0-2 /HPF / WBC 11-25 /HPF   Sq Epi: x / Non Sq Epi: OCC /HPF / Bacteria: Few /HPF        Physical Exam:   Gen: NAD, AAOx  Abdomen:   Incision:     Assesment:   67y Female who presents with    Plan:  -DVT PPX- Lovenox/ SQH   -Pain control   -OOB as tolerated with assistance   -Advance diet as tolerated  -Await Gi Fxn   -Dispo Planning [facility] RED SURGERY DAILY PROGRESS NOTE    Subjective:  Patient seen and examined on morning rounds.   Overnight had fever up to 39.5 x 1 reading, had fever wkup  - denies n/v  - ostomy with good output  - improving pain  - NGT with significant output as well      MEDICATIONS  (STANDING):  ALBUTerol/ipratropium for Nebulization 3 milliLiter(s) Nebulizer every 6 hours  buDESOnide 160 MICROgram(s)/formoterol 4.5 MICROgram(s) Inhaler 2 Puff(s) Inhalation two times a day  chlorhexidine 4% Liquid 1 Application(s) Topical <User Schedule>  dextrose 5% + sodium chloride 0.45% with potassium chloride 20 mEq/L 1000 milliLiter(s) (75 mL/Hr) IV Continuous <Continuous>  dextrose 5%. 1000 milliLiter(s) (50 mL/Hr) IV Continuous <Continuous>  dextrose 50% Injectable 12.5 Gram(s) IV Push once  dextrose 50% Injectable 25 Gram(s) IV Push once  dextrose 50% Injectable 25 Gram(s) IV Push once  digoxin  Injectable 0.25 milliGRAM(s) IV Push daily  enoxaparin Injectable 40 milliGRAM(s) SubCutaneous daily  hydrocortisone sodium succinate Injectable 20 milliGRAM(s) IV Push daily  insulin lispro (HumaLOG) corrective regimen sliding scale   SubCutaneous every 6 hours  ondansetron Injectable 4 milliGRAM(s) IV Push once  pantoprazole  Injectable 40 milliGRAM(s) IV Push daily  tiotropium 18 MICROgram(s) Capsule 1 Capsule(s) Inhalation daily    MEDICATIONS  (PRN):  ALBUTerol    90 MICROgram(s) HFA Inhaler 2 Puff(s) Inhalation every 6 hours PRN Shortness of Breath  dextrose 40% Gel 15 Gram(s) Oral once PRN Blood Glucose LESS THAN 70 milliGRAM(s)/deciliter  diphenhydrAMINE   Injectable 12.5 milliGRAM(s) IV Push every 4 hours PRN Itching  glucagon  Injectable 1 milliGRAM(s) IntraMuscular once PRN Glucose LESS THAN 70 milligrams/deciliter  metoclopramide Injectable 10 milliGRAM(s) IV Push every 6 hours PRN Nausea  morphine  - Injectable 2 milliGRAM(s) IV Push every 4 hours PRN Moderate Pain (4 - 6)  tetracaine/benzocaine/butamben Spray 1 Spray(s) Topical four times a day PRN NGT irritation      Vital Signs Last 24 Hrs  T(C): 37.7 (03 Aug 2018 02:06), Max: 39.5 (02 Aug 2018 21:02)  T(F): 99.8 (03 Aug 2018 02:06), Max: 103.1 (02 Aug 2018 21:02)  HR: 90 (03 Aug 2018 02:06) (73 - 95)  BP: 144/77 (03 Aug 2018 00:33) (112/71 - 162/75)  BP(mean): --  RR: 18 (03 Aug 2018 00:33) (18 - 18)  SpO2: 96% (03 Aug 2018 00:33) (94% - 96%)    I&O's Detail    01 Aug 2018 07:01  -  02 Aug 2018 07:00  --------------------------------------------------------  IN:    dextrose 5% + sodium chloride 0.45%.: 843.5 mL    dextrose 5% + sodium chloride 0.45%.: 400 mL    Oral Fluid: 340 mL    Solution: 100 mL  Total IN: 1683.5 mL    OUT:    Colostomy: 200 mL    Emesis: 75 mL    Voided: 1350 mL  Total OUT: 1625 mL    Total NET: 58.5 mL      02 Aug 2018 07:01  -  03 Aug 2018 03:14  --------------------------------------------------------  IN:    dextrose 5% + sodium chloride 0.45% with potassium chloride 20 mEq/L: 800 mL  Total IN: 800 mL    OUT:    Colostomy: 425 mL    Nasoenteral Tube: 1000 mL    Voided: 400 mL  Total OUT: 1825 mL    Total NET: -1025 mL          Daily     Daily     LABS:                        10.5   11.9  )-----------( 440      ( 02 Aug 2018 22:55 )             33.4     08-02    133<L>  |  95<L>  |  <4<L>  ----------------------------<  201<H>  3.7   |  26  |  0.60    Ca    7.9<L>      02 Aug 2018 06:43  Phos  2.5     08-  Mg     1.7     08-        Urinalysis Basic - ( 01 Aug 2018 22:31 )    Color: Yellow / Appearance: Clear / S.013 / pH: x  Gluc: x / Ketone: Negative  / Bili: Negative / Urobili: Negative   Blood: x / Protein: Trace / Nitrite: Negative   Leuk Esterase: Small / RBC: 0-2 /HPF / WBC 11-25 /HPF   Sq Epi: x / Non Sq Epi: OCC /HPF / Bacteria: Few /HPF        Physical Exam:   Gen:       alert and oriented x4  Lungs:       unlabored breathing  CV:       regular rate, rhythm  Abd:       nontender, nondistended, appropriately tender over surgical site  Ext:        nontender to palpation  Skin:       incisions clean, dry, intact, nondiscolored, nonerythematous, soft

## 2018-08-03 NOTE — PROGRESS NOTE ADULT - ATTENDING COMMENTS
as above-Stable at present--off abx--+ ostomy function--new temps--work up in progress (CXR, sputum, blood, urine etc.)-Likely GI (sputum c/w pseudomonas Aereginosa---sensitivities pending--would hold on rx  CRS- NGT re-placed--await bowel fxn return  Pulm meds-symbicort/spiriva, duoneb, singulair                  Adrenal insufficiency-medrol 4mg  ambulate--as per CRS--pain control and motility agents.   Rehab pending return of gI fxn    Eulalio Allison MD-Pulmonary   [phone]

## 2018-08-03 NOTE — PROGRESS NOTE ADULT - SUBJECTIVE AND OBJECTIVE BOX
Consult:  · Requested by Name:	Terrell Arango	  · Date/Time:	03-Aug-2018 	  · Reason for Referral/Consultation:	Perioperative management of cardiac conditions	      · Subjective and Objective: 	  **********              CARDIOLOGY CONSULT PROGRESS NOTE              ************  ============================================================  CHIEF COMPLAINT/REASON FOR CONSULT:  Patient is a 67y old  Female who presents with a chief complaint of rectal bleeding (2018 10:00)      HISTORY OF PRESENT ILLNESS:  67yFemale with a history of Remote Asthma, DVT, TIA, COPD, Moderate AI, Normal LVEF, pAF on Digoxin/Eliquis prior to admission, Colorectal CA p/w bleeding s/p SCC resection POD#1  - presenting for SCC resection.  No CP/SOB/Palps.         ============================================================  24 hr events  - BP controlled; HR controlled  - Continues on IV Dig (level low)  - NG Tube in place  - Continued fever   - Continued NGT output  ============================================================      Allergies    ampicillin (Short breath)  aspirin (Short breath)  Avelox (Short breath; Pruritus)  codeine (Short breath)  Dilaudid (Short breath)  iodine (Short breath; Swelling)  penicillin (Short breath)  shellfish (Anaphylaxis)  tetanus toxoid (Short breath)  Valium (Short breath)    Intolerances    	    MEDICATIONS:  heparin  Injectable 5000 Unit(s) SubCutaneous every 8 hours    aztreonam  IVPB 2000 milliGRAM(s) IV Intermittent once  clindamycin IVPB 900 milliGRAM(s) IV Intermittent once    ALBUTerol    90 MICROgram(s) HFA Inhaler 2 Puff(s) Inhalation every 6 hours PRN  ALBUTerol/ipratropium for Nebulization 3 milliLiter(s) Nebulizer every 6 hours  diphenhydrAMINE   Injectable 12.5 milliGRAM(s) IV Push every 4 hours PRN  tiotropium 18 MICROgram(s) Capsule 1 Capsule(s) Inhalation daily    morphine PCA (5 mG/mL) 30 milliLiter(s) PCA Continuous PCA Continuous  morphine PCA (5 mG/mL) Rescue Clinician Bolus 2.5 milliGRAM(s) IV Push every 15 minutes PRN    pantoprazole  Injectable 40 milliGRAM(s) IV Push daily    dextrose 40% Gel 15 Gram(s) Oral once PRN  dextrose 50% Injectable 12.5 Gram(s) IV Push once  dextrose 50% Injectable 25 Gram(s) IV Push once  dextrose 50% Injectable 25 Gram(s) IV Push once  glucagon  Injectable 1 milliGRAM(s) IntraMuscular once PRN  insulin lispro (HumaLOG) corrective regimen sliding scale   SubCutaneous every 6 hours    dextrose 5% + sodium chloride 0.45%. 1000 milliLiter(s) IV Continuous <Continuous>  dextrose 5%. 1000 milliLiter(s) IV Continuous <Continuous>      PAST MEDICAL & SURGICAL HISTORY:  TIA (transient ischemic attack): multiple, last 5 years ago - presents as right-sided weakness  DVT (deep venous thrombosis): 15-20 years ago, took coumadin  Seizure: x 1 18  Rectal bleeding  Colorectal cancer: 2018- last treatment , chemo and radiation  Tracheobronchomalacia: diagnosed , s/p bronchial thermoplasty  (Dr Zapien); recent bronchoscopy 2018 revealed no evidence of tracheobronchomalacia in trachea or bronchial tubes  Asthma  Pelvic fracture  Aortic insufficiency: moderate AR on echo 5/3/2018  Adrenal insufficiency: Medrol daily for over 50 years  COPD (chronic obstructive pulmonary disease)  Diabetes: Type 2  Atrial fibrillation: paroxysmal, on eliquis  Rectal bleeding: exam under anesthesia ([facility]U) 2018  S/P bronchoscopy: 2018 - 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  History of sinus surgery: multiple sinus surgeries  H/O total knee replacement, bilateral: 5 years ago  History of partial hysterectomy: 30 years ago - fibroids      FAMILY HISTORY:  Family history of diabetes mellitus type II  Family history of breast cancer (Sibling)  Family history of asthma    NC -   Mother - HTN  Father - DM    SOCIAL HISTORY:    [ x] Non-smoker  [x] No Illicit Drug Use  [ x] No Excess EtOH Use      REVIEW OF SYSTEMS: (Unless + Before Symptom, it is negative)  Constitutional: No Fever, +Fatigue, Weight Changes  Eyes: No Recent Vision Changes, Eye Pain  ENT: No Congestion, Sore Throat  Endocrine: No Excess Sweating, Temperature Intolerance  Cardiovascular: No Chest Pain, Palpitations, Shortness of Breath, Pre-syncope, Syncope, LE Edema  Respiratory: No Cough, Congestion, Wheezing  Gastrointestinal: +Abdominal Pain, Nausea, Vomiting  Genitourinary: No dysuria, hematuria  Musculoskeletal: No Joint Pain, Swelling  Neurologic: No headaches, Imbalance, Weakness  Skin: No rashes, hematoma, purprura    ================================    PHYSICAL EXAM:  Vital Signs Last 24 Hrs  T(C): 36.8 (01 Aug 2018 16:33), Max: 39.5 (2018 21:00)  T(F): 98.2 (01 Aug 2018 16:33), Max: 103.1 (2018 21:00)  HR: 70 (01 Aug 2018 16:33) (70 - 101)  BP: 117/64 (01 Aug 2018 16:33) (95/62 - 143/83)  BP(mean): --  RR: 18 (01 Aug 2018 16:33) (10 - 18)  SpO2: 96% (01 Aug 2018 16:33) (94% - 98%)    Appearance: Normal; NAD	  HEENT:   Normal oral mucosa, EOMI	  Lymphatic: No lymphadenopathy  Cardiovascular: Normal S1 S2, No JVD, No murmurs, No edema  Respiratory: Lungs clear to auscultation, no use of accessory muscles	  Psychiatry: A & O x 3, Mood & affect appropriate  Gastrointestinal:  Soft, +Distended +Less Tender +Ostomy  Skin: No rashes, No ecchymoses, No cyanosis	  Neurologic: Non-focal, No Focal Deficits  Extremities: Normal range of motion, No clubbing, cyanosis or edema  Vascular: Peripheral pulses palpable 2+ bilaterally, no prominent varicosities    ============================    LABS:	   Labs [id number]    CBC Full  -  ( 2018 04:32 )  WBC Count : 14.8 K/uL  Hemoglobin : 11.4 g/dL  Hematocrit : 37.5 %  Platelet Count - Automated : 201 K/uL  Mean Cell Volume : 76.1 fl  Mean Cell Hemoglobin : 23.1 pg  Mean Cell Hemoglobin Concentration : 30.3 gm/dL  Auto Neutrophil # : x  Auto Lymphocyte # : x  Auto Monocyte # : x  Auto Eosinophil # : x  Auto Basophil # : x  Auto Neutrophil % : x  Auto Lymphocyte % : x  Auto Monocyte % : x  Auto Eosinophil % : x  Auto Basophil % : x    07-25    142  |  106  |  10  ----------------------------<  191<H>  4.6   |  26  |  0.77  07-25    140  |  104  |  9   ----------------------------<  201<H>  4.4   |  25  |  0.73    Ca    8.2<L>      2018 04:32  Ca    8.2<L>      2018 00:16  Phos  4.2     07-25  Phos  4.1     07-25  Mg     2.1     07-25  Mg     2.0     07-25          =========================================================================  CXR:  < from: Xray Chest 1 View- PORTABLE-Urgent (18 @ 16:02) >    EXAM:  XR CHEST PORTABLE URGENT 1V                          PROCEDURE DATE:  2018          INTERPRETATION:  Portable Chest X-Ray dated 2018 4:02 PM    Indication: s/p bronch    Prior studies: 2018    An AP portable view of the chest revealed cardiomegaly. Trachea midline.   No pneumothorax seen. Trace right pleural effusion. Increased markings in   the left lower lobe. Right Port-A-Cath with the tip in the right atrium.    IMPRESSION:  No pneumothorax seen. Trace right pleural effusion.             "Thank you for the opportunity to participate in the care of this   patient."    < end of copied text >      ECG:  	    PREVIOUS DIAGNOSTIC TESTING:    [X] Echocardiogram:  < from: Echocardiogram (18 @ 10:55) >      INTERPRETATION:  Patient Height: 170.2 cm  Patient Weight: 73.0 kg  Heart Rate: 70 bpm  Systolic Pressure: 132 mmHg  Diastolic Pressure: 70 mmHg  BSA: 1.8 m^2  Interpretation Summary  The left atrial size is normal. Right atrial size is normal.There is mild   aortic valve thickening. The aortic valve is trileaflet. There is   moderate   aortic regurgitation. No hemodynamically significantvalvular aortic   stenosis.    There is mild mitral valve thickening. There is mild mitral annular   calcification. There is trace mitral regurgitation.  Structurally normal   tricuspid valve. There is trace tricuspid regurgitation.There was   insufficient   TR detected from which to calculate pulmonary artery systolic pressure.    The   pulmonic valve is not well visualized. No pulmonic regurgitation   noted.The   right ventricle is normal in size and function.There is moderate   concentric   left ventricular hypertrophy. The left ventricular wall motion is   normal. The   left ventricular ejection fraction is 63%.  No aortic root   dilatation.There is   no pericardial effusion.When compared to prior study performed on   10/21/16,   there isno significant change.  Procedure Details  A complete two-dimensional transthoracic echocardiogram was performed (2D,  M-mode, spectral and color flow doppler).  Study Quality: Fair.  Left Ventricle  There is moderate concentric left ventricular hypertrophy.  The left ventricular wall motion is normal.  The left ventricular ejection fraction is 63%.  Left Atrium  The left atrial size is normal.  Right Atrium  Right atrial size is normal.  Right Ventricle  The right ventricle is normal in size andfunction.  Aortic Valve  There is mild aortic valve thickening.  The aortic valve is trileaflet.  There is moderate aortic regurgitation.  No hemodynamically significant valvular aortic stenosis.  Mitral Valve  There is mild mitral valve thickening.  There is mild mitral annular calcification.  There is trace mitral regurgitation.  Tricuspid Valve  Structurally normal tricuspid valve.  There was insufficient TR detected from which to calculate pulmonary   artery  systolic pressure.  There is trace tricuspid regurgitation.  Pulmonic Valve  The pulmonic valve is not well visualized.  No pulmonic regurgitation noted.  Arteries and Venous System  No aortic root dilatation.  The inferior vena cava is normal in size (<2.1 cm) with normal inspiratory  collapse (>50%) consistent with normal right atrial pressure.  Pericardium / Pleura  There is no pericardial effusion.  Doppler Measurements & Calculations  MV E point: 67.9 cm/sec  MV A point: 91.3 cm/sec  MV E/A: 0.7  MV dec time: 0.2 sec  Ao V2 max: 172.4 cm/sec  Ao max P.9 mmHg  Ao max PG (full): 5.5 mmHg  SUSANA(V,A): 2.4 cm^2  SUSANA(V,D): 2.4 cm^2  AI max lynette: 399.1 cm/sec  AI max P.7 mmHg  AI dec slope: 275.9 cm/sec^2  AI P1/2t: 423.7 msec  LV max P.4 mmHg  LV V1 max: 126.5 cm/sec  MMode 2D Measurements & Calculations  IVSd: 1.3 cm  LVIDd: 4.2 cm  LVIDs: 2.9 cm  LVPWd: 1.1 cm  IVS/LVPW: 1.2  FS: 31.3 %  EDV(Teich): 80.1 ml  ESV(Teich): 32.5 ml  LV mass(C)d: 180.5 grams  LV mass(C)dI: 97.9 grams/m^2  SI(cubed): 27.8 ml/m^2  Ao root diam: 3.3 cm  Ao root area: 8.8 cm^2  LA dimension: 4.6 cm  LA/Ao: 1.4  LVOT diam: 2.0 cm  LVOT area: 3.2 cm^2  LVOT area (M): 3.8 cm^2  LVLd ap4: 7.6 cm  EDV(MOD-sp4): 70 ml  LVLs ap4: 6.6 cm  ESV(MOD-sp4): 22 ml  EF(MOD-sp4): 68.6 %  LVLd ap2: 7.4 cm  EDV(MOD-sp2): 43 ml  LVLs ap2: 5.9 cm  ESV(MOD-sp2): 16 ml  EF(MOD-sp2): 62.8 %  SV(MOD-sp4): 48 ml  SI(MOD-sp4): 26.0 ml/m^2  SV(MOD-sp2): 27 ml  SI(MOD-sp2): 14.6 ml/m^2  Interpreting Physician:OMAR Dias electronically signed on   2018 12:45:03        =========================================================================  ASSESSMENT:    67yFemale with a history of Remote Asthma, DVT, TIA, COPD, Moderate AI, Normal LVEF, pAF on Digoxin/Eliquis prior to admission, Colorectal CA p/w bleeding s/p SCC resection POD#1  - presenting for SCC resection.    Recs  - Resume Eliquis when able  - Continue Digoxin  - Please Replete Potassium (hypokalemia can potentiate arrhythmia with digoxin)  - Check EKG  - Hold Diovan  - Careful with fluid load given AI  - Thank you for this consult.  - Will Follow      Please call with questions.     Baron Tristan MD, [facility]  [phone]

## 2018-08-03 NOTE — PROGRESS NOTE ADULT - ASSESSMENT
RAYRAY RODRIGUEZ is a 67y Female   s/p abdominoperineal proctectomy for rectal bleeding 7/24    PLAN:  -  c/t NGT, has significant output  -  order UA and UCx to complete fever workup  -  diet: NPO  -  fluids: 1x MIVF  -  monitor ostomy output  -  drains: wound vac  -  encourage IS 10x/hr while awake  -  DVT ppx  -  OOB to ambulate TID  -  pain control    -  Dispo:  awaiting return of bowel function  -  Plan d/w attending      [facility]  [id number]

## 2018-08-04 LAB
ANION GAP SERPL CALC-SCNC: 12 MMOL/L — SIGNIFICANT CHANGE UP (ref 5–17)
BUN SERPL-MCNC: <4 MG/DL — LOW (ref 7–23)
CALCIUM SERPL-MCNC: 8 MG/DL — LOW (ref 8.4–10.5)
CHLORIDE SERPL-SCNC: 97 MMOL/L — SIGNIFICANT CHANGE UP (ref 96–108)
CK MB CFR SERPL CALC: 1 NG/ML — SIGNIFICANT CHANGE UP (ref 0–3.8)
CK SERPL-CCNC: 27 U/L — SIGNIFICANT CHANGE UP (ref 25–170)
CO2 SERPL-SCNC: 25 MMOL/L — SIGNIFICANT CHANGE UP (ref 22–31)
CREAT SERPL-MCNC: 0.6 MG/DL — SIGNIFICANT CHANGE UP (ref 0.5–1.3)
CULTURE RESULTS: SIGNIFICANT CHANGE UP
CULTURE RESULTS: SIGNIFICANT CHANGE UP
DIGOXIN SERPL-MCNC: 0.8 NG/ML — SIGNIFICANT CHANGE UP (ref 0.8–2)
GLUCOSE BLDC GLUCOMTR-MCNC: 167 MG/DL — HIGH (ref 70–99)
GLUCOSE BLDC GLUCOMTR-MCNC: 189 MG/DL — HIGH (ref 70–99)
GLUCOSE BLDC GLUCOMTR-MCNC: 209 MG/DL — HIGH (ref 70–99)
GLUCOSE BLDC GLUCOMTR-MCNC: 263 MG/DL — HIGH (ref 70–99)
GLUCOSE SERPL-MCNC: 214 MG/DL — HIGH (ref 70–99)
HCT VFR BLD CALC: 29.1 % — LOW (ref 34.5–45)
HGB BLD-MCNC: 9.5 G/DL — LOW (ref 11.5–15.5)
MAGNESIUM SERPL-MCNC: 1.8 MG/DL — SIGNIFICANT CHANGE UP (ref 1.6–2.6)
MCHC RBC-ENTMCNC: 23.5 PG — LOW (ref 27–34)
MCHC RBC-ENTMCNC: 32.6 GM/DL — SIGNIFICANT CHANGE UP (ref 32–36)
MCV RBC AUTO: 72 FL — LOW (ref 80–100)
PHOSPHATE SERPL-MCNC: 2.3 MG/DL — LOW (ref 2.5–4.5)
PLATELET # BLD AUTO: 452 K/UL — HIGH (ref 150–400)
POTASSIUM SERPL-MCNC: 4.5 MMOL/L — SIGNIFICANT CHANGE UP (ref 3.5–5.3)
POTASSIUM SERPL-SCNC: 4.5 MMOL/L — SIGNIFICANT CHANGE UP (ref 3.5–5.3)
RBC # BLD: 4.04 M/UL — SIGNIFICANT CHANGE UP (ref 3.8–5.2)
RBC # FLD: 15.1 % — HIGH (ref 10.3–14.5)
SODIUM SERPL-SCNC: 134 MMOL/L — LOW (ref 135–145)
SPECIMEN SOURCE: SIGNIFICANT CHANGE UP
SPECIMEN SOURCE: SIGNIFICANT CHANGE UP
TROPONIN T, HIGH SENSITIVITY RESULT: 10 NG/L — SIGNIFICANT CHANGE UP (ref 0–51)
WBC # BLD: 11.23 K/UL — HIGH (ref 3.8–10.5)
WBC # FLD AUTO: 11.23 K/UL — HIGH (ref 3.8–10.5)

## 2018-08-04 PROCEDURE — 99233 SBSQ HOSP IP/OBS HIGH 50: CPT

## 2018-08-04 PROCEDURE — 93010 ELECTROCARDIOGRAM REPORT: CPT

## 2018-08-04 PROCEDURE — 99232 SBSQ HOSP IP/OBS MODERATE 35: CPT

## 2018-08-04 PROCEDURE — 71045 X-RAY EXAM CHEST 1 VIEW: CPT | Mod: 26

## 2018-08-04 RX ADMIN — Medication 3 MILLILITER(S): at 13:20

## 2018-08-04 RX ADMIN — MORPHINE SULFATE 2 MILLIGRAM(S): 50 CAPSULE, EXTENDED RELEASE ORAL at 20:19

## 2018-08-04 RX ADMIN — Medication 2: at 18:37

## 2018-08-04 RX ADMIN — PANTOPRAZOLE SODIUM 40 MILLIGRAM(S): 20 TABLET, DELAYED RELEASE ORAL at 13:23

## 2018-08-04 RX ADMIN — MORPHINE SULFATE 2 MILLIGRAM(S): 50 CAPSULE, EXTENDED RELEASE ORAL at 06:30

## 2018-08-04 RX ADMIN — Medication 3 MILLILITER(S): at 06:07

## 2018-08-04 RX ADMIN — CHLORHEXIDINE GLUCONATE 1 APPLICATION(S): 213 SOLUTION TOPICAL at 09:19

## 2018-08-04 RX ADMIN — Medication 3 MILLILITER(S): at 18:39

## 2018-08-04 RX ADMIN — Medication 3 MILLILITER(S): at 00:20

## 2018-08-04 RX ADMIN — MORPHINE SULFATE 2 MILLIGRAM(S): 50 CAPSULE, EXTENDED RELEASE ORAL at 20:49

## 2018-08-04 RX ADMIN — TIOTROPIUM BROMIDE 1 CAPSULE(S): 18 CAPSULE ORAL; RESPIRATORY (INHALATION) at 13:30

## 2018-08-04 RX ADMIN — Medication 6: at 13:18

## 2018-08-04 RX ADMIN — CEFTRIAXONE 100 GRAM(S): 500 INJECTION, POWDER, FOR SOLUTION INTRAMUSCULAR; INTRAVENOUS at 18:35

## 2018-08-04 RX ADMIN — BUDESONIDE AND FORMOTEROL FUMARATE DIHYDRATE 2 PUFF(S): 160; 4.5 AEROSOL RESPIRATORY (INHALATION) at 06:07

## 2018-08-04 RX ADMIN — Medication 20 MILLIGRAM(S): at 06:07

## 2018-08-04 RX ADMIN — Medication 4: at 07:05

## 2018-08-04 RX ADMIN — MORPHINE SULFATE 2 MILLIGRAM(S): 50 CAPSULE, EXTENDED RELEASE ORAL at 06:15

## 2018-08-04 RX ADMIN — ENOXAPARIN SODIUM 40 MILLIGRAM(S): 100 INJECTION SUBCUTANEOUS at 13:20

## 2018-08-04 RX ADMIN — Medication 0.25 MILLIGRAM(S): at 13:22

## 2018-08-04 NOTE — PROGRESS NOTE ADULT - ASSESSMENT
67 yr F with PMH of COPD/asthma, tracheobronchomalacia s/p tracheo-bronchoplasty in 10/16, Afib on Eliquis , Adrenal Insufficieny on steroids,  DM2, HTN, Squamous cell CA of the anus on chemo/XRT, now s/p elective abdominoperineal proctectomy for recurrent exophytic anal cancer on 7/24/18    1. Cough:  - Currently stable pulm status  -  Sputum Cx--pseudomonas aereginosa    -  Off Antibiotic regimen of Aztreonam and Clindamycin as of 7/27   - On nebulized hypertonic saline Q12H (preceded by Duonebs) to assist with mobilization of secretions  - Cont close monitoring of resp symptoms. Optimize pain to prevent resp splinting  - Incentive spirometry/ acapella use/ OBC and continue mobilization    2. COPD/ Asthma/ Tracheomalacia s/p tracheobronchoplasty- on Duonebs Q6H  - continue Symbicort BID (takes Breo ellipta at home which is non formularly)  - continue Spiriva daily  - Once tolerating PO intake, can restart home dose of Singulair daily  - Supplemental O2 to keep sats > 90 %    SEE below as well--NGT out 7/31; 8/1-fever--cxr--c/w prior films-sputum c/w pseudomonas Aereginosa  8/3-ceftriaxone for UTI

## 2018-08-04 NOTE — PROGRESS NOTE ADULT - ATTENDING COMMENTS
as above-Stable at present--off abx--+ ostomy function--new temps---Likely urine based on UA (sputum c/w pseudomonas Aereginosa---sensitivities pending--would hold on rx; UTI--ceftraixone initiated 8/3  CRS- NGT re-placed--await bowel fxn return  Pulm meds-symbicort/spiriva, duoneb, singulair                  Adrenal insufficiency-medrol 4mg  ambulate--as per CRS--pain control and motility agents.   Rehab pending return of gI fxn    Eulalio Allison MD-Pulmonary   [phone]

## 2018-08-04 NOTE — PROGRESS NOTE ADULT - ASSESSMENT
RAYRAY RODRIGUEZ is a 67y Female   s/p abdominoperineal proctectomy for rectal bleeding 7/24    PLAN:  -  will tx with ceftriaxone for UTI on UA  -  NGT clamp trial this AM  -  DVT ppx    [facility]  [id number]

## 2018-08-04 NOTE — PROGRESS NOTE ADULT - SUBJECTIVE AND OBJECTIVE BOX
Consult:  · Requested by Name:	Terrell Arango	  · Date/Time:	04-Aug-2018 	  · Reason for Referral/Consultation:	Perioperative management of cardiac conditions	      · Subjective and Objective: 	  **********              CARDIOLOGY CONSULT PROGRESS NOTE              ************  ============================================================  CHIEF COMPLAINT/REASON FOR CONSULT:  Patient is a 67y old  Female who presents with a chief complaint of rectal bleeding (2018 10:00)      HISTORY OF PRESENT ILLNESS:  67yFemale with a history of Remote Asthma, DVT, TIA, COPD, Moderate AI, Normal LVEF, pAF on Digoxin/Eliquis prior to admission, Colorectal CA p/w bleeding s/p SCC resection POD#1  - presenting for SCC resection.  No CP/SOB/Palps.         ============================================================  24 hr events  - BP controlled; HR controlled  - Continues on IV Dig  - NG Tube clamped  - No reported worsening CP/Palps/SOB   ============================================================      Allergies    ampicillin (Short breath)  aspirin (Short breath)  Avelox (Short breath; Pruritus)  codeine (Short breath)  Dilaudid (Short breath)  iodine (Short breath; Swelling)  penicillin (Short breath)  shellfish (Anaphylaxis)  tetanus toxoid (Short breath)  Valium (Short breath)    Intolerances    	    MEDICATIONS:  heparin  Injectable 5000 Unit(s) SubCutaneous every 8 hours    aztreonam  IVPB 2000 milliGRAM(s) IV Intermittent once  clindamycin IVPB 900 milliGRAM(s) IV Intermittent once    ALBUTerol    90 MICROgram(s) HFA Inhaler 2 Puff(s) Inhalation every 6 hours PRN  ALBUTerol/ipratropium for Nebulization 3 milliLiter(s) Nebulizer every 6 hours  diphenhydrAMINE   Injectable 12.5 milliGRAM(s) IV Push every 4 hours PRN  tiotropium 18 MICROgram(s) Capsule 1 Capsule(s) Inhalation daily    morphine PCA (5 mG/mL) 30 milliLiter(s) PCA Continuous PCA Continuous  morphine PCA (5 mG/mL) Rescue Clinician Bolus 2.5 milliGRAM(s) IV Push every 15 minutes PRN    pantoprazole  Injectable 40 milliGRAM(s) IV Push daily    dextrose 40% Gel 15 Gram(s) Oral once PRN  dextrose 50% Injectable 12.5 Gram(s) IV Push once  dextrose 50% Injectable 25 Gram(s) IV Push once  dextrose 50% Injectable 25 Gram(s) IV Push once  glucagon  Injectable 1 milliGRAM(s) IntraMuscular once PRN  insulin lispro (HumaLOG) corrective regimen sliding scale   SubCutaneous every 6 hours    dextrose 5% + sodium chloride 0.45%. 1000 milliLiter(s) IV Continuous <Continuous>  dextrose 5%. 1000 milliLiter(s) IV Continuous <Continuous>      PAST MEDICAL & SURGICAL HISTORY:  TIA (transient ischemic attack): multiple, last 5 years ago - presents as right-sided weakness  DVT (deep venous thrombosis): 15-20 years ago, took coumadin  Seizure: x 1 18  Rectal bleeding  Colorectal cancer: 2018- last treatment , chemo and radiation  Tracheobronchomalacia: diagnosed , s/p bronchial thermoplasty  (Dr Zapien); recent bronchoscopy 2018 revealed no evidence of tracheobronchomalacia in trachea or bronchial tubes  Asthma  Pelvic fracture  Aortic insufficiency: moderate AR on echo 5/3/2018  Adrenal insufficiency: Medrol daily for over 50 years  COPD (chronic obstructive pulmonary disease)  Diabetes: Type 2  Atrial fibrillation: paroxysmal, on eliquis  Rectal bleeding: exam under anesthesia ([facility]) 2018  S/P bronchoscopy: 2018 - [facility] (Dr Zapien) no evidence of tracheobronchomalacia in trachea or bronchial tubes  History of tracheomalacia:  - attempted tracheal stenting ([facility])- course complicated by obstruction, respiratory failure, multiple CPR attempts -  stent discontinued; 10/20/2016 Tracheobronchoplasty (Prolene Mesh) performed at [facility] by Dr Zapien  H/O pelvic surgery: 5 years ago - s/p fracture  Exostosis of orbit, 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  History of sinus surgery: multiple sinus surgeries  H/O total knee replacement, bilateral: 5 years ago  History of partial hysterectomy: 30 years ago - fibroids      FAMILY HISTORY:  Family history of diabetes mellitus type II  Family history of breast cancer (Sibling)  Family history of asthma    NC -   Mother - HTN  Father - DM    SOCIAL HISTORY:    [ x] Non-smoker  [x] No Illicit Drug Use  [ x] No Excess EtOH Use      REVIEW OF SYSTEMS: (Unless + Before Symptom, it is negative)  Constitutional: No Fever, +Fatigue, Weight Changes  Eyes: No Recent Vision Changes, Eye Pain  ENT: No Congestion, Sore Throat  Endocrine: No Excess Sweating, Temperature Intolerance  Cardiovascular: No Chest Pain, Palpitations, Shortness of Breath, Pre-syncope, Syncope, LE Edema  Respiratory: No Cough, Congestion, Wheezing  Gastrointestinal: +Abdominal Pain, Nausea, Vomiting  Genitourinary: No dysuria, hematuria  Musculoskeletal: No Joint Pain, Swelling  Neurologic: No headaches, Imbalance, Weakness  Skin: No rashes, hematoma, purprura    ================================    PHYSICAL EXAM:  Vital Signs Last 24 Hrs  T(C): 36.8 (01 Aug 2018 16:33), Max: 39.5 (2018 21:00)  T(F): 98.2 (01 Aug 2018 16:33), Max: 103.1 (2018 21:00)  HR: 70 (01 Aug 2018 16:33) (70 - 101)  BP: 117/64 (01 Aug 2018 16:33) (95/62 - 143/83)  BP(mean): --  RR: 18 (01 Aug 2018 16:33) (10 - 18)  SpO2: 96% (01 Aug 2018 16:33) (94% - 98%)    Appearance: Normal; NAD	  HEENT:   Normal oral mucosa, EOMI	  Lymphatic: No lymphadenopathy  Cardiovascular: Normal S1 S2, No JVD, No murmurs, No edema  Respiratory: Lungs clear to auscultation, no use of accessory muscles	  Psychiatry: A & O x 3, Mood & affect appropriate  Gastrointestinal:  Soft, +Distended +Less Tender +Ostomy  Skin: No rashes, No ecchymoses, No cyanosis	  Neurologic: Non-focal, No Focal Deficits  Extremities: Normal range of motion, No clubbing, cyanosis or edema  Vascular: Peripheral pulses palpable 2+ bilaterally, no prominent varicosities    ============================    LABS:	   Labs [id number]    CBC Full  -  ( 2018 04:32 )  WBC Count : 14.8 K/uL  Hemoglobin : 11.4 g/dL  Hematocrit : 37.5 %  Platelet Count - Automated : 201 K/uL  Mean Cell Volume : 76.1 fl  Mean Cell Hemoglobin : 23.1 pg  Mean Cell Hemoglobin Concentration : 30.3 gm/dL  Auto Neutrophil # : x  Auto Lymphocyte # : x  Auto Monocyte # : x  Auto Eosinophil # : x  Auto Basophil # : x  Auto Neutrophil % : x  Auto Lymphocyte % : x  Auto Monocyte % : x  Auto Eosinophil % : x  Auto Basophil % : x    07-    142  |  106  |  10  ----------------------------<  191<H>  4.6   |  26  |  0.77  07-25    140  |  104  |  9   ----------------------------<  201<H>  4.4   |  25  |  0.73    Ca    8.2<L>      2018 04:32  Ca    8.2<L>      2018 00:16  Phos  4.2       Phos  4.1     -25  Mg     2.1     -25  Mg     2.0     25          =========================================================================  CXR:  < from: Xray Chest 1 View- PORTABLE-Urgent (18 @ 16:02) >    EXAM:  XR CHEST PORTABLE URGENT 1V                          PROCEDURE DATE:  2018          INTERPRETATION:  Portable Chest X-Ray dated 2018 4:02 PM    Indication: s/p bronch    Prior studies: 2018    An AP portable view of the chest revealed cardiomegaly. Trachea midline.   No pneumothorax seen. Trace right pleural effusion. Increased markings in   the left lower lobe. Right Port-A-Cath with the tip in the right atrium.    IMPRESSION:  No pneumothorax seen. Trace right pleural effusion.             "Thank you for the opportunity to participate in the care of this   patient."    < end of copied text >      ECG:  	    PREVIOUS DIAGNOSTIC TESTING:    [X] Echocardiogram:  < from: Echocardiogram (18 @ 10:55) >      INTERPRETATION:  Patient Height: 170.2 cm  Patient Weight: 73.0 kg  Heart Rate: 70 bpm  Systolic Pressure: 132 mmHg  Diastolic Pressure: 70 mmHg  BSA: 1.8 m^2  Interpretation Summary  The left atrial size is normal. Right atrial size is normal.There is mild   aortic valve thickening. The aortic valve is trileaflet. There is   moderate   aortic regurgitation. No hemodynamically significantvalvular aortic   stenosis.    There is mild mitral valve thickening. There is mild mitral annular   calcification. There is trace mitral regurgitation.  Structurally normal   tricuspid valve. There is trace tricuspid regurgitation.There was   insufficient   TR detected from which to calculate pulmonary artery systolic pressure.    The   pulmonic valve is not well visualized. No pulmonic regurgitation   noted.The   right ventricle is normal in size and function.There is moderate   concentric   left ventricular hypertrophy. The left ventricular wall motion is   normal. The   left ventricular ejection fraction is 63%.  No aortic root   dilatation.There is   no pericardial effusion.When compared to prior study performed on   10/21/16,   there isno significant change.  Procedure Details  A complete two-dimensional transthoracic echocardiogram was performed (2D,  M-mode, spectral and color flow doppler).  Study Quality: Fair.  Left Ventricle  There is moderate concentric left ventricular hypertrophy.  The left ventricular wall motion is normal.  The left ventricular ejection fraction is 63%.  Left Atrium  The left atrial size is normal.  Right Atrium  Right atrial size is normal.  Right Ventricle  The right ventricle is normal in size andfunction.  Aortic Valve  There is mild aortic valve thickening.  The aortic valve is trileaflet.  There is moderate aortic regurgitation.  No hemodynamically significant valvular aortic stenosis.  Mitral Valve  There is mild mitral valve thickening.  There is mild mitral annular calcification.  There is trace mitral regurgitation.  Tricuspid Valve  Structurally normal tricuspid valve.  There was insufficient TR detected from which to calculate pulmonary   artery  systolic pressure.  There is trace tricuspid regurgitation.  Pulmonic Valve  The pulmonic valve is not well visualized.  No pulmonic regurgitation noted.  Arteries and Venous System  No aortic root dilatation.  The inferior vena cava is normal in size (<2.1 cm) with normal inspiratory  collapse (>50%) consistent with normal right atrial pressure.  Pericardium / Pleura  There is no pericardial effusion.  Doppler Measurements & Calculations  MV E point: 67.9 cm/sec  MV A point: 91.3 cm/sec  MV E/A: 0.7  MV dec time: 0.2 sec  Ao V2 max: 172.4 cm/sec  Ao max P.9 mmHg  Ao max PG (full): 5.5 mmHg  SUSANA(V,A): 2.4 cm^2  SUSANA(V,D): 2.4 cm^2  AI max lynette: 399.1 cm/sec  AI max P.7 mmHg  AI dec slope: 275.9 cm/sec^2  AI P1/2t: 423.7 msec  LV max P.4 mmHg  LV V1 max: 126.5 cm/sec  MMode 2D Measurements & Calculations  IVSd: 1.3 cm  LVIDd: 4.2 cm  LVIDs: 2.9 cm  LVPWd: 1.1 cm  IVS/LVPW: 1.2  FS: 31.3 %  EDV(Teich): 80.1 ml  ESV(Teich): 32.5 ml  LV mass(C)d: 180.5 grams  LV mass(C)dI: 97.9 grams/m^2  SI(cubed): 27.8 ml/m^2  Ao root diam: 3.3 cm  Ao root area: 8.8 cm^2  LA dimension: 4.6 cm  LA/Ao: 1.4  LVOT diam: 2.0 cm  LVOT area: 3.2 cm^2  LVOT area (M): 3.8 cm^2  LVLd ap4: 7.6 cm  EDV(MOD-sp4): 70 ml  LVLs ap4: 6.6 cm  ESV(MOD-sp4): 22 ml  EF(MOD-sp4): 68.6 %  LVLd ap2: 7.4 cm  EDV(MOD-sp2): 43 ml  LVLs ap2: 5.9 cm  ESV(MOD-sp2): 16 ml  EF(MOD-sp2): 62.8 %  SV(MOD-sp4): 48 ml  SI(MOD-sp4): 26.0 ml/m^2  SV(MOD-sp2): 27 ml  SI(MOD-sp2): 14.6 ml/m^2  Interpreting Physician:OMAR Dias electronically signed on   2018 12:45:03        =========================================================================  ASSESSMENT:    67yFemale with a history of Remote Asthma, DVT, TIA, COPD, Moderate AI, Normal LVEF, pAF on Digoxin/Eliquis prior to admission, Colorectal CA p/w bleeding s/p SCC resection POD#1  - presenting for SCC resection.    Recs  - Resume Eliquis when able  - Continue Digoxin  - Check EKG  - Hold Diovan  - Careful with fluid load given AI  - Thank you for this consult.  - Will Follow      Please call with questions.     Baron Tristan MD, [facility]  [phone]

## 2018-08-04 NOTE — PROVIDER CONTACT NOTE (OTHER) - REASON
1) getting resistance from ACW mediport when flushing (awaiting IV RN); 2) pt requesting antibiotics for PNA; 3) pt has not voided this morning
No IV access at this time, pt an extremely hard stick & has a R chest wall Medi Port that is deaccessed
Pt febrile
Pt febrile
Pt has temperature
pt c/o "chest pain", points to epigastric area
pt heartrate elevated
pt temp increasing; pt asymptomatic
pt wound vac blockage
Low Urine Output

## 2018-08-04 NOTE — PROGRESS NOTE ADULT - SUBJECTIVE AND OBJECTIVE BOX
CHIEF COMPLAINT: f/ukp sob, asthma, tbm, chronic bronchitis-fevers-much better-frequ urination-minimal cough and wheeze, some sob    Interval Events: ceftriaxone initiated; NGT remains    REVIEW OF SYSTEMS:  Constitutional: No fevers or chills. No weight loss. + fatigue or generalized malaise.  Eyes: No itching or discharge from the eyes  ENT: No ear pain. No ear discharge. No nasal congestion. No post nasal drip. No epistaxis. No throat pain. No sore throat. No difficulty swallowing.   CV: No chest pain. No palpitations. No lightheadedness or dizziness.   Resp: No dyspnea at rest. + dyspnea on exertion. No orthopnea. No wheezing. No cough. No stridor. No sputum production. No chest pain with respiration.  GI: No nausea. No vomiting. No diarrhea.  MSK: No joint pain or pain in any extremities  Integumentary: No skin lesions. No pedal edema.  Neurological: No gross motor weakness. No sensory changes.  [+ ] All other systems negative  [ ] Unable to assess ROS because ________    OBJECTIVE:  ICU Vital Signs Last 24 Hrs  T(C): 37.7 (04 Aug 2018 01:16), Max: 37.7 (03 Aug 2018 21:59)  T(F): 99.8 (04 Aug 2018 01:16), Max: 99.8 (03 Aug 2018 21:59)  HR: 88 (04 Aug 2018 01:16) (83 - 91)  BP: 121/69 (04 Aug 2018 01:16) (108/63 - 151/74)  BP(mean): --  ABP: --  ABP(mean): --  RR: 18 (04 Aug 2018 01:16) (18 - 18)  SpO2: 94% (04 Aug 2018 01:16) (94% - 98%)         @ :  -  -03 @ 07:00  --------------------------------------------------------  IN: 1687.5 mL / OUT: 2350 mL / NET: -662.5 mL     @ 07: @ 05:08  --------------------------------------------------------  IN: 600 mL / OUT: 1150 mL / NET: -550 mL      CAPILLARY BLOOD GLUCOSE      POCT Blood Glucose.: 139 mg/dL (03 Aug 2018 23:57)      PHYSICAL EXAM: NAD in bed w/ NGT in place  General: Awake, alert, oriented X 3.   HEENT: Atraumatic, normocephalic.                 Mallampatti Grade 2                No nasal congestion.                No tonsillar or pharyngeal exudates.  Lymph Nodes: No palpable lymphadenopathy  Neck: No JVD. No carotid bruit.   Respiratory: Normal chest expansion                         Normal percussion                         Normal and equal air entry                         No wheeze, rhonchi or rales.  Cardiovascular: S1 S2 normal. No murmurs, rubs or gallops.   Abdomen: Soft, non-tender, non-distended. No organomegaly. NABS  Extremities: Warm to touch. Peripheral pulse palpable. No pedal edema.   Skin: No rashes or skin lesions  Neurological: Motor and sensory examination equal and normal in all four extremities.  Psychiatry: Appropriate mood and affect.    HOSPITAL MEDICATIONS:  MEDICATIONS  (STANDING):  ALBUTerol/ipratropium for Nebulization 3 milliLiter(s) Nebulizer every 6 hours  buDESOnide 160 MICROgram(s)/formoterol 4.5 MICROgram(s) Inhaler 2 Puff(s) Inhalation two times a day  cefTRIAXone   IVPB 1 Gram(s) IV Intermittent every 24 hours  chlorhexidine 4% Liquid 1 Application(s) Topical <User Schedule>  dextrose 5% + sodium chloride 0.45% with potassium chloride 20 mEq/L 1000 milliLiter(s) (75 mL/Hr) IV Continuous <Continuous>  dextrose 5%. 1000 milliLiter(s) (50 mL/Hr) IV Continuous <Continuous>  dextrose 50% Injectable 12.5 Gram(s) IV Push once  dextrose 50% Injectable 25 Gram(s) IV Push once  dextrose 50% Injectable 25 Gram(s) IV Push once  digoxin  Injectable 0.25 milliGRAM(s) IV Push daily  enoxaparin Injectable 40 milliGRAM(s) SubCutaneous daily  hydrocortisone sodium succinate Injectable 20 milliGRAM(s) IV Push daily  insulin lispro (HumaLOG) corrective regimen sliding scale   SubCutaneous every 6 hours  ondansetron Injectable 4 milliGRAM(s) IV Push once  pantoprazole  Injectable 40 milliGRAM(s) IV Push daily  tiotropium 18 MICROgram(s) Capsule 1 Capsule(s) Inhalation daily    MEDICATIONS  (PRN):  ALBUTerol    90 MICROgram(s) HFA Inhaler 2 Puff(s) Inhalation every 6 hours PRN Shortness of Breath  dextrose 40% Gel 15 Gram(s) Oral once PRN Blood Glucose LESS THAN 70 milliGRAM(s)/deciliter  diphenhydrAMINE   Injectable 12.5 milliGRAM(s) IV Push every 4 hours PRN Itching  glucagon  Injectable 1 milliGRAM(s) IntraMuscular once PRN Glucose LESS THAN 70 milligrams/deciliter  metoclopramide Injectable 10 milliGRAM(s) IV Push every 6 hours PRN Nausea  morphine  - Injectable 2 milliGRAM(s) IV Push every 4 hours PRN Moderate Pain (4 - 6)  tetracaine/benzocaine/butamben Spray 1 Spray(s) Topical four times a day PRN NGT irritation      LABS:                        10.0   10.38 )-----------( 401      ( 03 Aug 2018 07:57 )             30.8     08-03    134<L>  |  96  |  <4<L>  ----------------------------<  170<H>  3.3<L>   |  27  |  0.62    Ca    7.8<L>      03 Aug 2018 05:26  Phos  2.6     08-03  Mg     2.0     08-03        Urinalysis Basic - ( 03 Aug 2018 14:15 )    Color: Yellow / Appearance: SL Turbid / S.022 / pH: x  Gluc: x / Ketone: Large  / Bili: Negative / Urobili: Negative   Blood: x / Protein: 30 mg/dL / Nitrite: Negative   Leuk Esterase: Moderate / RBC: 5-10 /HPF / WBC >50 /HPF   Sq Epi: x / Non Sq Epi: Occasional /HPF / Bacteria: x            MICROBIOLOGY:     RADIOLOGY:  [ ] Reviewed and interpreted by me    Point of Care Ultrasound Findings:    PFT:    EKG:

## 2018-08-04 NOTE — PROGRESS NOTE ADULT - SUBJECTIVE AND OBJECTIVE BOX
INTERVAL HPI/OVERNIGHT EVENTS:  Patient is a 67yFemale  + colostomy function yesterday, NGT clamped    Vital Signs Last 24 Hrs  T(C): 37.3 (04 Aug 2018 09:11), Max: 37.7 (03 Aug 2018 21:59)  T(F): 99.2 (04 Aug 2018 09:11), Max: 99.8 (03 Aug 2018 21:59)  HR: 86 (04 Aug 2018 09:11) (83 - 91)  BP: 123/71 (04 Aug 2018 09:11) (121/69 - 151/74)  BP(mean): --  RR: 18 (04 Aug 2018 09:11) (18 - 18)  SpO2: 92% (04 Aug 2018 09:11) (92% - 97%)   @ 07:01  -   @ 07:00  --------------------------------------------------------  IN: 1700 mL / OUT: 1150 mL / NET: 550 mL     @ 07:01  -  04 @ 10:44  --------------------------------------------------------  IN: 0 mL / OUT: 0 mL / NET: 0 mL        PHYSICAL EXAM:  Constitutional: well developed, well nourished, NAD  Eyes: anicteric  ENMT: normal facies, symmetric  Neck: supple  Respiratory: CTA bilat  Cardiovascular: RRR  Gastrointestinal: abdomen soft, nontender, nondistended. No obvious masses. No peritonitis, incisions c/d/i, colostomy pink  Extremities: FROM, warm  Neurological: intact, non-focal  Skin: no gross lesions  Lymph Nodes: no gross adenopathy  Musculoskeletal: equal strength bilateral upper and lower extremities  Psychiatric: oriented x 3; appropriate          LABS:                        9.5    11.23 )-----------( 452      ( 04 Aug 2018 09:05 )             29.1         134<L>  |  97  |  <4<L>  ----------------------------<  214<H>  4.5   |  25  |  0.60    Ca    8.0<L>      04 Aug 2018 07:32  Phos  2.3       Mg     1.8             Urinalysis Basic - ( 03 Aug 2018 14:15 )    Color: Yellow / Appearance: SL Turbid / S.022 / pH: x  Gluc: x / Ketone: Large  / Bili: Negative / Urobili: Negative   Blood: x / Protein: 30 mg/dL / Nitrite: Negative   Leuk Esterase: Moderate / RBC: 5-10 /HPF / WBC >50 /HPF   Sq Epi: x / Non Sq Epi: Occasional /HPF / Bacteria: x      Magnesium, Serum: 1.8 mg/dL ( @ 07:32)  Phosphorus Level, Serum: 2.3 mg/dL ( @ 07:32)

## 2018-08-04 NOTE — PROGRESS NOTE ADULT - SUBJECTIVE AND OBJECTIVE BOX
[facility] -- SURGERY RED TEAM -- DAILY PROGRESS NOTE:       SUBJECTIVE:    Pt examined at bedside this morning.  -  no n/v while NPO  -  minimal NG output over past day  -  no sob  -  no dysuria      OBJECTIVE:    MEDICATIONS  (STANDING):  ALBUTerol/ipratropium for Nebulization 3 milliLiter(s) Nebulizer every 6 hours  buDESOnide 160 MICROgram(s)/formoterol 4.5 MICROgram(s) Inhaler 2 Puff(s) Inhalation two times a day  cefTRIAXone   IVPB 1 Gram(s) IV Intermittent every 24 hours  chlorhexidine 4% Liquid 1 Application(s) Topical <User Schedule>  dextrose 5% + sodium chloride 0.45% with potassium chloride 20 mEq/L 1000 milliLiter(s) (75 mL/Hr) IV Continuous <Continuous>  dextrose 5%. 1000 milliLiter(s) (50 mL/Hr) IV Continuous <Continuous>  dextrose 50% Injectable 12.5 Gram(s) IV Push once  dextrose 50% Injectable 25 Gram(s) IV Push once  dextrose 50% Injectable 25 Gram(s) IV Push once  digoxin  Injectable 0.25 milliGRAM(s) IV Push daily  enoxaparin Injectable 40 milliGRAM(s) SubCutaneous daily  hydrocortisone sodium succinate Injectable 20 milliGRAM(s) IV Push daily  insulin lispro (HumaLOG) corrective regimen sliding scale   SubCutaneous every 6 hours  ondansetron Injectable 4 milliGRAM(s) IV Push once  pantoprazole  Injectable 40 milliGRAM(s) IV Push daily  tiotropium 18 MICROgram(s) Capsule 1 Capsule(s) Inhalation daily    MEDICATIONS  (PRN):  ALBUTerol    90 MICROgram(s) HFA Inhaler 2 Puff(s) Inhalation every 6 hours PRN Shortness of Breath  dextrose 40% Gel 15 Gram(s) Oral once PRN Blood Glucose LESS THAN 70 milliGRAM(s)/deciliter  diphenhydrAMINE   Injectable 12.5 milliGRAM(s) IV Push every 4 hours PRN Itching  glucagon  Injectable 1 milliGRAM(s) IntraMuscular once PRN Glucose LESS THAN 70 milligrams/deciliter  metoclopramide Injectable 10 milliGRAM(s) IV Push every 6 hours PRN Nausea  morphine  - Injectable 2 milliGRAM(s) IV Push every 4 hours PRN Moderate Pain (4 - 6)  tetracaine/benzocaine/butamben Spray 1 Spray(s) Topical four times a day PRN NGT irritation      Vital Signs Last 24 Hrs  T(C): 37.4 (04 Aug 2018 14:10), Max: 37.7 (03 Aug 2018 21:59)  T(F): 99.4 (04 Aug 2018 14:10), Max: 99.8 (03 Aug 2018 21:59)  HR: 87 (04 Aug 2018 14:10) (83 - 88)  BP: 105/63 (04 Aug 2018 14:10) (105/63 - 151/74)  BP(mean): --  RR: 18 (04 Aug 2018 14:10) (18 - 18)  SpO2: 95% (04 Aug 2018 14:10) (92% - 95%)    I&O's Detail    03 Aug 2018 07:01  -  04 Aug 2018 07:00  --------------------------------------------------------  IN:    dextrose 5% + sodium chloride 0.45% with potassium chloride 20 mEq/L: 1500 mL    Solution: 200 mL  Total IN: 1700 mL    OUT:    Nasoenteral Tube: 100 mL    VAC (Vacuum Assisted Closure) System: 50 mL    Voided: 1000 mL  Total OUT: 1150 mL    Total NET: 550 mL      04 Aug 2018 07:01  -  04 Aug 2018 15:29  --------------------------------------------------------  IN:  Total IN: 0 mL    OUT:    Voided: 200 mL  Total OUT: 200 mL    Total NET: -200 mL          PHYSICAL EXAM:     Gen:       alert and oriented x4  Lungs:       unlabored breathing  CV:       regular rate, rhythm  Abd:       minimal tenderness, nondistended  Ext:        nontender to palpation  Skin:       incisions clean, dry, intact, nondiscolored, nonerythematous, soft    LABS:                        9.5    11.23 )-----------( 452      ( 04 Aug 2018 09:05 )             29.1     08-04    134<L>  |  97  |  <4<L>  ----------------------------<  214<H>  4.5   |  25  |  0.60    Ca    8.0<L>      04 Aug 2018 07:32  Phos  2.3     08-  Mg     1.8     08-        Urinalysis Basic - ( 03 Aug 2018 14:15 )    Color: Yellow / Appearance: SL Turbid / S.022 / pH: x  Gluc: x / Ketone: Large  / Bili: Negative / Urobili: Negative   Blood: x / Protein: 30 mg/dL / Nitrite: Negative   Leuk Esterase: Moderate / RBC: 5-10 /HPF / WBC >50 /HPF   Sq Epi: x / Non Sq Epi: Occasional /HPF / Bacteria: x

## 2018-08-04 NOTE — PROVIDER CONTACT NOTE (OTHER) - DATE AND TIME:
02-Aug-2018 21:02
03-Aug-2018 16:40
04-Apr-2918 15:45
25-Jul-2018 23:00
26-Jul-2018 05:30
28-Jul-2018 00:39
30-Jul-2018 19:40
31-Jul-2018 21:03
31-Jul-2018 18:15

## 2018-08-05 LAB
ANION GAP SERPL CALC-SCNC: 12 MMOL/L — SIGNIFICANT CHANGE UP (ref 5–17)
BUN SERPL-MCNC: <4 MG/DL — LOW (ref 7–23)
CALCIUM SERPL-MCNC: 7.9 MG/DL — LOW (ref 8.4–10.5)
CHLORIDE SERPL-SCNC: 97 MMOL/L — SIGNIFICANT CHANGE UP (ref 96–108)
CO2 SERPL-SCNC: 25 MMOL/L — SIGNIFICANT CHANGE UP (ref 22–31)
CREAT SERPL-MCNC: 0.61 MG/DL — SIGNIFICANT CHANGE UP (ref 0.5–1.3)
GLUCOSE BLDC GLUCOMTR-MCNC: 169 MG/DL — HIGH (ref 70–99)
GLUCOSE BLDC GLUCOMTR-MCNC: 180 MG/DL — HIGH (ref 70–99)
GLUCOSE BLDC GLUCOMTR-MCNC: 189 MG/DL — HIGH (ref 70–99)
GLUCOSE BLDC GLUCOMTR-MCNC: 235 MG/DL — HIGH (ref 70–99)
GLUCOSE SERPL-MCNC: 156 MG/DL — HIGH (ref 70–99)
HCT VFR BLD CALC: 26.8 % — LOW (ref 34.5–45)
HGB BLD-MCNC: 8.7 G/DL — LOW (ref 11.5–15.5)
MAGNESIUM SERPL-MCNC: 1.8 MG/DL — SIGNIFICANT CHANGE UP (ref 1.6–2.6)
MCHC RBC-ENTMCNC: 23.1 PG — LOW (ref 27–34)
MCHC RBC-ENTMCNC: 32.5 GM/DL — SIGNIFICANT CHANGE UP (ref 32–36)
MCV RBC AUTO: 71.3 FL — LOW (ref 80–100)
PHOSPHATE SERPL-MCNC: 2.8 MG/DL — SIGNIFICANT CHANGE UP (ref 2.5–4.5)
PLATELET # BLD AUTO: 413 K/UL — HIGH (ref 150–400)
POTASSIUM SERPL-MCNC: 3.9 MMOL/L — SIGNIFICANT CHANGE UP (ref 3.5–5.3)
POTASSIUM SERPL-SCNC: 3.9 MMOL/L — SIGNIFICANT CHANGE UP (ref 3.5–5.3)
RBC # BLD: 3.76 M/UL — LOW (ref 3.8–5.2)
RBC # FLD: 15.4 % — HIGH (ref 10.3–14.5)
SODIUM SERPL-SCNC: 134 MMOL/L — LOW (ref 135–145)
WBC # BLD: 8.4 K/UL — SIGNIFICANT CHANGE UP (ref 3.8–10.5)
WBC # FLD AUTO: 8.4 K/UL — SIGNIFICANT CHANGE UP (ref 3.8–10.5)

## 2018-08-05 PROCEDURE — 99232 SBSQ HOSP IP/OBS MODERATE 35: CPT

## 2018-08-05 PROCEDURE — 99233 SBSQ HOSP IP/OBS HIGH 50: CPT

## 2018-08-05 RX ORDER — INSULIN LISPRO 100/ML
VIAL (ML) SUBCUTANEOUS
Qty: 0 | Refills: 0 | Status: DISCONTINUED | OUTPATIENT
Start: 2018-08-05 | End: 2018-08-08

## 2018-08-05 RX ORDER — INSULIN LISPRO 100/ML
VIAL (ML) SUBCUTANEOUS AT BEDTIME
Qty: 0 | Refills: 0 | Status: DISCONTINUED | OUTPATIENT
Start: 2018-08-05 | End: 2018-08-08

## 2018-08-05 RX ORDER — SODIUM CHLORIDE 9 MG/ML
500 INJECTION INTRAMUSCULAR; INTRAVENOUS; SUBCUTANEOUS ONCE
Qty: 0 | Refills: 0 | Status: DISCONTINUED | OUTPATIENT
Start: 2018-08-05 | End: 2018-08-05

## 2018-08-05 RX ADMIN — Medication 2: at 05:36

## 2018-08-05 RX ADMIN — PANTOPRAZOLE SODIUM 40 MILLIGRAM(S): 20 TABLET, DELAYED RELEASE ORAL at 11:36

## 2018-08-05 RX ADMIN — Medication 3 MILLILITER(S): at 18:38

## 2018-08-05 RX ADMIN — BUDESONIDE AND FORMOTEROL FUMARATE DIHYDRATE 2 PUFF(S): 160; 4.5 AEROSOL RESPIRATORY (INHALATION) at 05:35

## 2018-08-05 RX ADMIN — Medication 3 MILLILITER(S): at 11:52

## 2018-08-05 RX ADMIN — DEXTROSE MONOHYDRATE, SODIUM CHLORIDE, AND POTASSIUM CHLORIDE 110 MILLILITER(S): 50; .745; 4.5 INJECTION, SOLUTION INTRAVENOUS at 05:35

## 2018-08-05 RX ADMIN — MORPHINE SULFATE 2 MILLIGRAM(S): 50 CAPSULE, EXTENDED RELEASE ORAL at 02:37

## 2018-08-05 RX ADMIN — CHLORHEXIDINE GLUCONATE 1 APPLICATION(S): 213 SOLUTION TOPICAL at 11:36

## 2018-08-05 RX ADMIN — MORPHINE SULFATE 2 MILLIGRAM(S): 50 CAPSULE, EXTENDED RELEASE ORAL at 12:22

## 2018-08-05 RX ADMIN — ENOXAPARIN SODIUM 40 MILLIGRAM(S): 100 INJECTION SUBCUTANEOUS at 11:37

## 2018-08-05 RX ADMIN — Medication 2: at 00:04

## 2018-08-05 RX ADMIN — Medication 2: at 11:38

## 2018-08-05 RX ADMIN — MORPHINE SULFATE 2 MILLIGRAM(S): 50 CAPSULE, EXTENDED RELEASE ORAL at 19:08

## 2018-08-05 RX ADMIN — Medication 3 MILLILITER(S): at 00:27

## 2018-08-05 RX ADMIN — Medication 3 MILLILITER(S): at 05:35

## 2018-08-05 RX ADMIN — DEXTROSE MONOHYDRATE, SODIUM CHLORIDE, AND POTASSIUM CHLORIDE 110 MILLILITER(S): 50; .745; 4.5 INJECTION, SOLUTION INTRAVENOUS at 23:13

## 2018-08-05 RX ADMIN — TIOTROPIUM BROMIDE 1 CAPSULE(S): 18 CAPSULE ORAL; RESPIRATORY (INHALATION) at 11:52

## 2018-08-05 RX ADMIN — MORPHINE SULFATE 2 MILLIGRAM(S): 50 CAPSULE, EXTENDED RELEASE ORAL at 02:07

## 2018-08-05 RX ADMIN — BUDESONIDE AND FORMOTEROL FUMARATE DIHYDRATE 2 PUFF(S): 160; 4.5 AEROSOL RESPIRATORY (INHALATION) at 18:38

## 2018-08-05 RX ADMIN — MORPHINE SULFATE 2 MILLIGRAM(S): 50 CAPSULE, EXTENDED RELEASE ORAL at 23:08

## 2018-08-05 RX ADMIN — Medication 0.25 MILLIGRAM(S): at 11:36

## 2018-08-05 RX ADMIN — CEFTRIAXONE 100 GRAM(S): 500 INJECTION, POWDER, FOR SOLUTION INTRAMUSCULAR; INTRAVENOUS at 18:38

## 2018-08-05 RX ADMIN — MORPHINE SULFATE 2 MILLIGRAM(S): 50 CAPSULE, EXTENDED RELEASE ORAL at 22:38

## 2018-08-05 RX ADMIN — MORPHINE SULFATE 2 MILLIGRAM(S): 50 CAPSULE, EXTENDED RELEASE ORAL at 18:38

## 2018-08-05 RX ADMIN — MORPHINE SULFATE 2 MILLIGRAM(S): 50 CAPSULE, EXTENDED RELEASE ORAL at 11:52

## 2018-08-05 RX ADMIN — Medication 20 MILLIGRAM(S): at 05:35

## 2018-08-05 NOTE — PROGRESS NOTE ADULT - PROBLEM SELECTOR PLAN 5
s/p[ surgery--optimal pain control  f/up final pathology  off IV abx as per CRS-as of 7/27  NGT in place-await ostomy fxn s/p[ surgery--optimal pain control  f/up final pathology  off IV abx as per CRS-as of 7/27---abx 8/3-ceftriaxone  NGT in place-await ostomy fxn

## 2018-08-05 NOTE — PROGRESS NOTE ADULT - SUBJECTIVE AND OBJECTIVE BOX
Consult:  · Requested by Name:	Terrell Arango	  · Date/Time:	05-Aug-2018 	  · Reason for Referral/Consultation:	Perioperative management of cardiac conditions	      · Subjective and Objective: 	  **********              CARDIOLOGY CONSULT PROGRESS NOTE              ************  ============================================================  CHIEF COMPLAINT/REASON FOR CONSULT:  Patient is a 67y old  Female who presents with a chief complaint of rectal bleeding (2018 10:00)      HISTORY OF PRESENT ILLNESS:  67yFemale with a history of Remote Asthma, DVT, TIA, COPD, Moderate AI, Normal LVEF, pAF on Digoxin/Eliquis prior to admission, Colorectal CA p/w bleeding s/p SCC resection POD#1  - presenting for SCC resection.  No CP/SOB/Palps.         ============================================================  24 hr events  - BP controlled; HR controlled  - Continues on IV Dig  - Tolerating PO  - No reported worsening CP/Palps/SOB   ============================================================      Allergies    ampicillin (Short breath)  aspirin (Short breath)  Avelox (Short breath; Pruritus)  codeine (Short breath)  Dilaudid (Short breath)  iodine (Short breath; Swelling)  penicillin (Short breath)  shellfish (Anaphylaxis)  tetanus toxoid (Short breath)  Valium (Short breath)    Intolerances    	    MEDICATIONS:  heparin  Injectable 5000 Unit(s) SubCutaneous every 8 hours    aztreonam  IVPB 2000 milliGRAM(s) IV Intermittent once  clindamycin IVPB 900 milliGRAM(s) IV Intermittent once    ALBUTerol    90 MICROgram(s) HFA Inhaler 2 Puff(s) Inhalation every 6 hours PRN  ALBUTerol/ipratropium for Nebulization 3 milliLiter(s) Nebulizer every 6 hours  diphenhydrAMINE   Injectable 12.5 milliGRAM(s) IV Push every 4 hours PRN  tiotropium 18 MICROgram(s) Capsule 1 Capsule(s) Inhalation daily    morphine PCA (5 mG/mL) 30 milliLiter(s) PCA Continuous PCA Continuous  morphine PCA (5 mG/mL) Rescue Clinician Bolus 2.5 milliGRAM(s) IV Push every 15 minutes PRN    pantoprazole  Injectable 40 milliGRAM(s) IV Push daily    dextrose 40% Gel 15 Gram(s) Oral once PRN  dextrose 50% Injectable 12.5 Gram(s) IV Push once  dextrose 50% Injectable 25 Gram(s) IV Push once  dextrose 50% Injectable 25 Gram(s) IV Push once  glucagon  Injectable 1 milliGRAM(s) IntraMuscular once PRN  insulin lispro (HumaLOG) corrective regimen sliding scale   SubCutaneous every 6 hours    dextrose 5% + sodium chloride 0.45%. 1000 milliLiter(s) IV Continuous <Continuous>  dextrose 5%. 1000 milliLiter(s) IV Continuous <Continuous>      PAST MEDICAL & SURGICAL HISTORY:  TIA (transient ischemic attack): multiple, last 5 years ago - presents as right-sided weakness  DVT (deep venous thrombosis): 15-20 years ago, took coumadin  Seizure: x 1 18  Rectal bleeding  Colorectal cancer: 2018- last treatment , chemo and radiation  Tracheobronchomalacia: diagnosed , s/p bronchial thermoplasty  (Dr Zapien); recent bronchoscopy 2018 revealed no evidence of tracheobronchomalacia in trachea or bronchial tubes  Asthma  Pelvic fracture  Aortic insufficiency: moderate AR on echo 5/3/2018  Adrenal insufficiency: Medrol daily for over 50 years  COPD (chronic obstructive pulmonary disease)  Diabetes: Type 2  Atrial fibrillation: paroxysmal, on eliquis  Rectal bleeding: exam under anesthesia ([facility]) 2018  S/P bronchoscopy: 2018 - [facility] (Dr Zapien) no evidence of tracheobronchomalacia in trachea or bronchial tubes  History of tracheomalacia:  - attempted tracheal stenting (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  History of sinus surgery: multiple sinus surgeries  H/O total knee replacement, bilateral: 5 years ago  History of partial hysterectomy: 30 years ago - fibroids      FAMILY HISTORY:  Family history of diabetes mellitus type II  Family history of breast cancer (Sibling)  Family history of asthma    NC -   Mother - HTN  Father - DM    SOCIAL HISTORY:    [ x] Non-smoker  [x] No Illicit Drug Use  [ x] No Excess EtOH Use      REVIEW OF SYSTEMS: (Unless + Before Symptom, it is negative)  Constitutional: No Fever, +Fatigue, Weight Changes  Eyes: No Recent Vision Changes, Eye Pain  ENT: No Congestion, Sore Throat  Endocrine: No Excess Sweating, Temperature Intolerance  Cardiovascular: No Chest Pain, Palpitations, Shortness of Breath, Pre-syncope, Syncope, LE Edema  Respiratory: No Cough, Congestion, Wheezing  Gastrointestinal: +Abdominal Pain, Nausea, Vomiting  Genitourinary: No dysuria, hematuria  Musculoskeletal: No Joint Pain, Swelling  Neurologic: No headaches, Imbalance, Weakness  Skin: No rashes, hematoma, purprura    ================================    PHYSICAL EXAM:  Vital Signs Last 24 Hrs  T(C): 36.8 (01 Aug 2018 16:33), Max: 39.5 (2018 21:00)  T(F): 98.2 (01 Aug 2018 16:33), Max: 103.1 (2018 21:00)  HR: 70 (01 Aug 2018 16:33) (70 - 101)  BP: 117/64 (01 Aug 2018 16:33) (95/62 - 143/83)  BP(mean): --  RR: 18 (01 Aug 2018 16:33) (10 - 18)  SpO2: 96% (01 Aug 2018 16:33) (94% - 98%)    Appearance: Normal; NAD	  HEENT:   Normal oral mucosa, EOMI	  Lymphatic: No lymphadenopathy  Cardiovascular: Normal S1 S2, No JVD, No murmurs, No edema  Respiratory: Lungs clear to auscultation, no use of accessory muscles	  Psychiatry: A & O x 3, Mood & affect appropriate  Gastrointestinal:  Soft, +Distended +Less Tender +Ostomy  Skin: No rashes, No ecchymoses, No cyanosis	  Neurologic: Non-focal, No Focal Deficits  Extremities: Normal range of motion, No clubbing, cyanosis or edema  Vascular: Peripheral pulses palpable 2+ bilaterally, no prominent varicosities    ============================    LABS:	   Labs     CBC Full  -  ( 2018 04:32 )  WBC Count : 14.8 K/uL  Hemoglobin : 11.4 g/dL  Hematocrit : 37.5 %  Platelet Count - Automated : 201 K/uL  Mean Cell Volume : 76.1 fl  Mean Cell Hemoglobin : 23.1 pg  Mean Cell Hemoglobin Concentration : 30.3 gm/dL  Auto Neutrophil # : x  Auto Lymphocyte # : x  Auto Monocyte # : x  Auto Eosinophil # : x  Auto Basophil # : x  Auto Neutrophil % : x  Auto Lymphocyte % : x  Auto Monocyte % : x  Auto Eosinophil % : x  Auto Basophil % : x    07    142  |  106  |  10  ----------------------------<  191<H>  4.6   |  26  |  0.77  07-25    140  |  104  |  9   ----------------------------<  201<H>  4.4   |  25  |  0.73    Ca    8.2<L>      2018 04:32  Ca    8.2<L>      2018 00:16  Phos  4.2       Phos  4.1     -25  Mg     2.1     -25  Mg     2.0     25          =========================================================================  CXR:  < from: Xray Chest 1 View- PORTABLE-Urgent (18 @ 16:02) >    EXAM:  XR CHEST PORTABLE URGENT 1V                          PROCEDURE DATE:  2018          INTERPRETATION:  Portable Chest X-Ray dated 2018 4:02 PM    Indication: s/p bronch    Prior studies: 2018    An AP portable view of the chest revealed cardiomegaly. Trachea midline.   No pneumothorax seen. Trace right pleural effusion. Increased markings in   the left lower lobe. Right Port-A-Cath with the tip in the right atrium.    IMPRESSION:  No pneumothorax seen. Trace right pleural effusion.             "Thank you for the opportunity to participate in the care of this   patient."    < end of copied text >      ECG:  	    PREVIOUS DIAGNOSTIC TESTING:    [X] Echocardiogram:  < from: Echocardiogram (18 @ 10:55) >      INTERPRETATION:  Patient Height: 170.2 cm  Patient Weight: 73.0 kg  Heart Rate: 70 bpm  Systolic Pressure: 132 mmHg  Diastolic Pressure: 70 mmHg  BSA: 1.8 m^2  Interpretation Summary  The left atrial size is normal. Right atrial size is normal.There is mild   aortic valve thickening. The aortic valve is trileaflet. There is   moderate   aortic regurgitation. No hemodynamically significantvalvular aortic   stenosis.    There is mild mitral valve thickening. There is mild mitral annular   calcification. There is trace mitral regurgitation.  Structurally normal   tricuspid valve. There is trace tricuspid regurgitation.There was   insufficient   TR detected from which to calculate pulmonary artery systolic pressure.    The   pulmonic valve is not well visualized. No pulmonic regurgitation   noted.The   right ventricle is normal in size and function.There is moderate   concentric   left ventricular hypertrophy. The left ventricular wall motion is   normal. The   left ventricular ejection fraction is 63%.  No aortic root   dilatation.There is   no pericardial effusion.When compared to prior study performed on   10/21/16,   there isno significant change.  Procedure Details  A complete two-dimensional transthoracic echocardiogram was performed (2D,  M-mode, spectral and color flow doppler).  Study Quality: Fair.  Left Ventricle  There is moderate concentric left ventricular hypertrophy.  The left ventricular wall motion is normal.  The left ventricular ejection fraction is 63%.  Left Atrium  The left atrial size is normal.  Right Atrium  Right atrial size is normal.  Right Ventricle  The right ventricle is normal in size andfunction.  Aortic Valve  There is mild aortic valve thickening.  The aortic valve is trileaflet.  There is moderate aortic regurgitation.  No hemodynamically significant valvular aortic stenosis.  Mitral Valve  There is mild mitral valve thickening.  There is mild mitral annular calcification.  There is trace mitral regurgitation.  Tricuspid Valve  Structurally normal tricuspid valve.  There was insufficient TR detected from which to calculate pulmonary   artery  systolic pressure.  There is trace tricuspid regurgitation.  Pulmonic Valve  The pulmonic valve is not well visualized.  No pulmonic regurgitation noted.  Arteries and Venous System  No aortic root dilatation.  The inferior vena cava is normal in size (<2.1 cm) with normal inspiratory  collapse (>50%) consistent with normal right atrial pressure.  Pericardium / Pleura  There is no pericardial effusion.  Doppler Measurements & Calculations  MV E point: 67.9 cm/sec  MV A point: 91.3 cm/sec  MV E/A: 0.7  MV dec time: 0.2 sec  Ao V2 max: 172.4 cm/sec  Ao max P.9 mmHg  Ao max PG (full): 5.5 mmHg  SUSANA(V,A): 2.4 cm^2  SUSANA(V,D): 2.4 cm^2  AI max lynette: 399.1 cm/sec  AI max P.7 mmHg  AI dec slope: 275.9 cm/sec^2  AI P1/2t: 423.7 msec  LV max P.4 mmHg  LV V1 max: 126.5 cm/sec  MMode 2D Measurements & Calculations  IVSd: 1.3 cm  LVIDd: 4.2 cm  LVIDs: 2.9 cm  LVPWd: 1.1 cm  IVS/LVPW: 1.2  FS: 31.3 %  EDV(Teich): 80.1 ml  ESV(Teich): 32.5 ml  LV mass(C)d: 180.5 grams  LV mass(C)dI: 97.9 grams/m^2  SI(cubed): 27.8 ml/m^2  Ao root diam: 3.3 cm  Ao root area: 8.8 cm^2  LA dimension: 4.6 cm  LA/Ao: 1.4  LVOT diam: 2.0 cm  LVOT area: 3.2 cm^2  LVOT area (M): 3.8 cm^2  LVLd ap4: 7.6 cm  EDV(MOD-sp4): 70 ml  LVLs ap4: 6.6 cm  ESV(MOD-sp4): 22 ml  EF(MOD-sp4): 68.6 %  LVLd ap2: 7.4 cm  EDV(MOD-sp2): 43 ml  LVLs ap2: 5.9 cm  ESV(MOD-sp2): 16 ml  EF(MOD-sp2): 62.8 %  SV(MOD-sp4): 48 ml  SI(MOD-sp4): 26.0 ml/m^2  SV(MOD-sp2): 27 ml  SI(MOD-sp2): 14.6 ml/m^2  Interpreting Physician:OMAR Dias electronically signed on   2018 12:45:03        =========================================================================  ASSESSMENT:    67yFemale with a history of Remote Asthma, DVT, TIA, COPD, Moderate AI, Normal LVEF, pAF on Digoxin/Eliquis prior to admission, Colorectal CA p/w bleeding s/p SCC resection POD#1  - presenting for SCC resection.    Recs  - Restart Apixaban unless contraindication  - Continue Digoxin -> Switch to PO  - Hold Diovan  - Careful with fluid load given AI  - Thank you for this consult.  - Will Follow      Please call with questions.     Baron Tristan MD, [facility]  [phone]

## 2018-08-05 NOTE — PROGRESS NOTE ADULT - ASSESSMENT
67 yr F with PMH of COPD/asthma, tracheobronchomalacia s/p tracheo-bronchoplasty in 10/16, Afib on Eliquis , Adrenal Insufficieny on steroids,  DM2, HTN, Squamous cell CA of the anus on chemo/XRT, now s/p elective abdominoperineal proctectomy for recurrent exophytic anal cancer on 7/24/18    1. Cough:  - Currently stable pulm status  -  Sputum Cx--pseudomonas aereginosa    -  Off Antibiotic regimen of Aztreonam and Clindamycin as of 7/27   - On nebulized hypertonic saline Q12H (preceded by Duonebs) to assist with mobilization of secretions  - Cont close monitoring of resp symptoms. Optimize pain to prevent resp splinting  - Incentive spirometry/ acapella use/ OBC and continue mobilization    2. COPD/ Asthma/ Tracheomalacia s/p tracheobronchoplasty- on Duonebs Q6H  - continue Symbicort BID (takes Breo ellipta at home which is non formularly)  - continue Spiriva daily  - Once tolerating PO intake, can restart home dose of Singulair daily  - Supplemental O2 to keep sats > 90 %    SEE below as well--NGT out 7/31; 8/1-fever--cxr--c/w prior films-sputum c/w pseudomonas Aereginosa  8/3-ceftriaxone for UTI 67 yr F with PMH of COPD/asthma, tracheobronchomalacia s/p tracheo-bronchoplasty in 10/16, Afib on Eliquis , Adrenal Insufficieny on steroids,  DM2, HTN, Squamous cell CA of the anus on chemo/XRT, now s/p elective abdominoperineal proctectomy for recurrent exophytic anal cancer on 7/24/18    1. Cough:  - Currently stable pulm status  -  Sputum Cx--pseudomonas aereginosa    -  Off Antibiotic regimen of Aztreonam and Clindamycin as of 7/27 ---now on abx for UTI (ceftriaxone 8/3)  - On nebulized hypertonic saline Q12H (preceded by Duonebs) to assist with mobilization of secretions  - Cont close monitoring of resp symptoms. Optimize pain to prevent resp splinting  - Incentive spirometry/ acapella use/ OBC and continue mobilization    2. COPD/ Asthma/ Tracheomalacia s/p tracheobronchoplasty- on Duonebs Q6H  - continue Symbicort BID (takes Breo ellipta at home which is non formularly)  - continue Spiriva daily  - Once tolerating PO intake, can restart home dose of Singulair daily  - Supplemental O2 to keep sats > 90 %    SEE below as well--NGT out 7/31; 8/1-fever--cxr--c/w prior films-sputum c/w pseudomonas Aereginosa  8/3-ceftriaxone for UTI

## 2018-08-05 NOTE — PROGRESS NOTE ADULT - ATTENDING COMMENTS
NGT d/judi yesterday, no N/V, + ostomy output    AAOx3  abd soft, nt/nd    s/p APR  clears today  OOB

## 2018-08-05 NOTE — PROGRESS NOTE ADULT - SUBJECTIVE AND OBJECTIVE BOX
[facility] RED SURGERY DAILY PROGRESS NOTE    Subjective:  Patient seen and examined on morning rounds.     MEDICATIONS  (STANDING):  ALBUTerol/ipratropium for Nebulization 3 milliLiter(s) Nebulizer every 6 hours  buDESOnide 160 MICROgram(s)/formoterol 4.5 MICROgram(s) Inhaler 2 Puff(s) Inhalation two times a day  cefTRIAXone   IVPB 1 Gram(s) IV Intermittent every 24 hours  chlorhexidine 4% Liquid 1 Application(s) Topical <User Schedule>  dextrose 5% + sodium chloride 0.45% with potassium chloride 20 mEq/L 1000 milliLiter(s) (75 mL/Hr) IV Continuous <Continuous>  dextrose 5%. 1000 milliLiter(s) (50 mL/Hr) IV Continuous <Continuous>  dextrose 50% Injectable 12.5 Gram(s) IV Push once  dextrose 50% Injectable 25 Gram(s) IV Push once  dextrose 50% Injectable 25 Gram(s) IV Push once  digoxin  Injectable 0.25 milliGRAM(s) IV Push daily  enoxaparin Injectable 40 milliGRAM(s) SubCutaneous daily  hydrocortisone sodium succinate Injectable 20 milliGRAM(s) IV Push daily  insulin lispro (HumaLOG) corrective regimen sliding scale   SubCutaneous every 6 hours  ondansetron Injectable 4 milliGRAM(s) IV Push once  pantoprazole  Injectable 40 milliGRAM(s) IV Push daily  tiotropium 18 MICROgram(s) Capsule 1 Capsule(s) Inhalation daily    MEDICATIONS  (PRN):  ALBUTerol    90 MICROgram(s) HFA Inhaler 2 Puff(s) Inhalation every 6 hours PRN Shortness of Breath  dextrose 40% Gel 15 Gram(s) Oral once PRN Blood Glucose LESS THAN 70 milliGRAM(s)/deciliter  diphenhydrAMINE   Injectable 12.5 milliGRAM(s) IV Push every 4 hours PRN Itching  glucagon  Injectable 1 milliGRAM(s) IntraMuscular once PRN Glucose LESS THAN 70 milligrams/deciliter  metoclopramide Injectable 10 milliGRAM(s) IV Push every 6 hours PRN Nausea  morphine  - Injectable 2 milliGRAM(s) IV Push every 4 hours PRN Moderate Pain (4 - 6)  tetracaine/benzocaine/butamben Spray 1 Spray(s) Topical four times a day PRN NGT irritation      Vital Signs Last 24 Hrs  T(C): 37.2 (05 Aug 2018 01:30), Max: 37.8 (04 Aug 2018 21:17)  T(F): 98.9 (05 Aug 2018 01:30), Max: 100 (04 Aug 2018 21:17)  HR: 84 (05 Aug 2018 01:30) (72 - 87)  BP: 135/71 (05 Aug 2018 01:30) (105/63 - 145/75)  BP(mean): --  RR: 18 (05 Aug 2018 01:) (18 - 18)  SpO2: 94% (05 Aug 2018 01:) (92% - 95%)    I&O's Detail    03 Aug 2018 07:01  -  04 Aug 2018 07:00  --------------------------------------------------------  IN:    dextrose 5% + sodium chloride 0.45% with potassium chloride 20 mEq/L: 1500 mL    Solution: 200 mL  Total IN: 1700 mL    OUT:    Nasoenteral Tube: 100 mL    VAC (Vacuum Assisted Closure) System: 50 mL    Voided: 1000 mL  Total OUT: 1150 mL    Total NET: 550 mL      04 Aug 2018 07:01  -  05 Aug 2018 01:49  --------------------------------------------------------  IN:    dextrose 5% + sodium chloride 0.45% with potassium chloride 20 mEq/L: 750 mL  Total IN: 750 mL    OUT:    Colostomy: 100 mL    Voided: 600 mL  Total OUT: 700 mL    Total NET: 50 mL          Daily     Daily     LABS:                        9.5    11.23 )-----------( 452      ( 04 Aug 2018 09:05 )             29.1     08-04    134<L>  |  97  |  <4<L>  ----------------------------<  214<H>  4.5   |  25  |  0.60    Ca    8.0<L>      04 Aug 2018 07:32  Phos  2.3     08-04  Mg     1.8     08-04        Urinalysis Basic - ( 03 Aug 2018 14:15 )    Color: Yellow / Appearance: SL Turbid / S.022 / pH: x  Gluc: x / Ketone: Large  / Bili: Negative / Urobili: Negative   Blood: x / Protein: 30 mg/dL / Nitrite: Negative   Leuk Esterase: Moderate / RBC: 5-10 /HPF / WBC >50 /HPF   Sq Epi: x / Non Sq Epi: Occasional /HPF / Bacteria: x        Physical Exam:   Gen: NAD, AAOx  Abdomen:   Incision:     Assesment:   67y Female who presents with s/p abdominoperineal proctectomy for rectal bleeding     Plan:  -DVT PPX- Lovenox/ SQH   -Pain control   -OOB as tolerated with assistance   -Advance diet as tolerated  -Await Gi Fxn   -Dispo Planning [facility] RED SURGERY DAILY PROGRESS NOTE    Subjective:  Patient seen and examined on morning rounds.   -  no n/v while NPO  -  minimal NG output over past day  -  no sob  -  mild dysuria    MEDICATIONS  (STANDING):  ALBUTerol/ipratropium for Nebulization 3 milliLiter(s) Nebulizer every 6 hours  buDESOnide 160 MICROgram(s)/formoterol 4.5 MICROgram(s) Inhaler 2 Puff(s) Inhalation two times a day  cefTRIAXone   IVPB 1 Gram(s) IV Intermittent every 24 hours  chlorhexidine 4% Liquid 1 Application(s) Topical <User Schedule>  dextrose 5% + sodium chloride 0.45% with potassium chloride 20 mEq/L 1000 milliLiter(s) (75 mL/Hr) IV Continuous <Continuous>  dextrose 5%. 1000 milliLiter(s) (50 mL/Hr) IV Continuous <Continuous>  dextrose 50% Injectable 12.5 Gram(s) IV Push once  dextrose 50% Injectable 25 Gram(s) IV Push once  dextrose 50% Injectable 25 Gram(s) IV Push once  digoxin  Injectable 0.25 milliGRAM(s) IV Push daily  enoxaparin Injectable 40 milliGRAM(s) SubCutaneous daily  hydrocortisone sodium succinate Injectable 20 milliGRAM(s) IV Push daily  insulin lispro (HumaLOG) corrective regimen sliding scale   SubCutaneous every 6 hours  ondansetron Injectable 4 milliGRAM(s) IV Push once  pantoprazole  Injectable 40 milliGRAM(s) IV Push daily  tiotropium 18 MICROgram(s) Capsule 1 Capsule(s) Inhalation daily    MEDICATIONS  (PRN):  ALBUTerol    90 MICROgram(s) HFA Inhaler 2 Puff(s) Inhalation every 6 hours PRN Shortness of Breath  dextrose 40% Gel 15 Gram(s) Oral once PRN Blood Glucose LESS THAN 70 milliGRAM(s)/deciliter  diphenhydrAMINE   Injectable 12.5 milliGRAM(s) IV Push every 4 hours PRN Itching  glucagon  Injectable 1 milliGRAM(s) IntraMuscular once PRN Glucose LESS THAN 70 milligrams/deciliter  metoclopramide Injectable 10 milliGRAM(s) IV Push every 6 hours PRN Nausea  morphine  - Injectable 2 milliGRAM(s) IV Push every 4 hours PRN Moderate Pain (4 - 6)  tetracaine/benzocaine/butamben Spray 1 Spray(s) Topical four times a day PRN NGT irritation      Vital Signs Last 24 Hrs  T(C): 37.2 (05 Aug 2018 01:30), Max: 37.8 (04 Aug 2018 21:17)  T(F): 98.9 (05 Aug 2018 01:30), Max: 100 (04 Aug 2018 21:17)  HR: 84 (05 Aug 2018 01:30) (72 - 87)  BP: 135/71 (05 Aug 2018 01:30) (105/63 - 145/75)  BP(mean): --  RR: 18 (05 Aug 2018 01:) (18 - 18)  SpO2: 94% (05 Aug 2018 01:30) (92% - 95%)    I&O's Detail    03 Aug 2018 07:01  -  04 Aug 2018 07:00  --------------------------------------------------------  IN:    dextrose 5% + sodium chloride 0.45% with potassium chloride 20 mEq/L: 1500 mL    Solution: 200 mL  Total IN: 1700 mL    OUT:    Nasoenteral Tube: 100 mL    VAC (Vacuum Assisted Closure) System: 50 mL    Voided: 1000 mL  Total OUT: 1150 mL    Total NET: 550 mL      04 Aug 2018 07:01  -  05 Aug 2018 01:49  --------------------------------------------------------  IN:    dextrose 5% + sodium chloride 0.45% with potassium chloride 20 mEq/L: 750 mL  Total IN: 750 mL    OUT:    Colostomy: 100 mL    Voided: 600 mL  Total OUT: 700 mL    Total NET: 50 mL          Daily     Daily     LABS:                        9.5    11.23 )-----------( 452      ( 04 Aug 2018 09:05 )             29.1     08-04    134<L>  |  97  |  <4<L>  ----------------------------<  214<H>  4.5   |  25  |  0.60    Ca    8.0<L>      04 Aug 2018 07:32  Phos  2.3     08-  Mg     1.8     08-04        Urinalysis Basic - ( 03 Aug 2018 14:15 )    Color: Yellow / Appearance: SL Turbid / S.022 / pH: x  Gluc: x / Ketone: Large  / Bili: Negative / Urobili: Negative   Blood: x / Protein: 30 mg/dL / Nitrite: Negative   Leuk Esterase: Moderate / RBC: 5-10 /HPF / WBC >50 /HPF   Sq Epi: x / Non Sq Epi: Occasional /HPF / Bacteria: x        Physical Exam:   Gen: NAD, AAOx  Abdomen: soft, NT, ND  Incision: c/d/i    Assesment:   67y Female who presents with s/p abdominoperineal proctectomy for rectal bleeding     Plan:  - continue ceftriaxone for UA (day 4 of 7)  - diet: advance to clears, assess tolerance.  -DVT PPX  -Pain control   -OOB as tolerated with assistance   -Advance diet as tolerated  -Await Gi Fxn   -Dispo Planning [facility] RED SURGERY DAILY PROGRESS NOTE    Subjective:  Patient seen and examined on morning rounds.   -  no n/v while NPO  -  no sob  -  mild dysuria    MEDICATIONS  (STANDING):  ALBUTerol/ipratropium for Nebulization 3 milliLiter(s) Nebulizer every 6 hours  buDESOnide 160 MICROgram(s)/formoterol 4.5 MICROgram(s) Inhaler 2 Puff(s) Inhalation two times a day  cefTRIAXone   IVPB 1 Gram(s) IV Intermittent every 24 hours  chlorhexidine 4% Liquid 1 Application(s) Topical <User Schedule>  dextrose 5% + sodium chloride 0.45% with potassium chloride 20 mEq/L 1000 milliLiter(s) (75 mL/Hr) IV Continuous <Continuous>  dextrose 5%. 1000 milliLiter(s) (50 mL/Hr) IV Continuous <Continuous>  dextrose 50% Injectable 12.5 Gram(s) IV Push once  dextrose 50% Injectable 25 Gram(s) IV Push once  dextrose 50% Injectable 25 Gram(s) IV Push once  digoxin  Injectable 0.25 milliGRAM(s) IV Push daily  enoxaparin Injectable 40 milliGRAM(s) SubCutaneous daily  hydrocortisone sodium succinate Injectable 20 milliGRAM(s) IV Push daily  insulin lispro (HumaLOG) corrective regimen sliding scale   SubCutaneous every 6 hours  ondansetron Injectable 4 milliGRAM(s) IV Push once  pantoprazole  Injectable 40 milliGRAM(s) IV Push daily  tiotropium 18 MICROgram(s) Capsule 1 Capsule(s) Inhalation daily    MEDICATIONS  (PRN):  ALBUTerol    90 MICROgram(s) HFA Inhaler 2 Puff(s) Inhalation every 6 hours PRN Shortness of Breath  dextrose 40% Gel 15 Gram(s) Oral once PRN Blood Glucose LESS THAN 70 milliGRAM(s)/deciliter  diphenhydrAMINE   Injectable 12.5 milliGRAM(s) IV Push every 4 hours PRN Itching  glucagon  Injectable 1 milliGRAM(s) IntraMuscular once PRN Glucose LESS THAN 70 milligrams/deciliter  metoclopramide Injectable 10 milliGRAM(s) IV Push every 6 hours PRN Nausea  morphine  - Injectable 2 milliGRAM(s) IV Push every 4 hours PRN Moderate Pain (4 - 6)  tetracaine/benzocaine/butamben Spray 1 Spray(s) Topical four times a day PRN NGT irritation      Vital Signs Last 24 Hrs  T(C): 37.2 (05 Aug 2018 01:30), Max: 37.8 (04 Aug 2018 21:17)  T(F): 98.9 (05 Aug 2018 01:30), Max: 100 (04 Aug 2018 21:17)  HR: 84 (05 Aug 2018 01:30) (72 - 87)  BP: 135/71 (05 Aug 2018 01:30) (105/63 - 145/75)  BP(mean): --  RR: 18 (05 Aug 2018 01:30) (18 - 18)  SpO2: 94% (05 Aug 2018 01:30) (92% - 95%)    I&O's Detail    03 Aug 2018 07:01  -  04 Aug 2018 07:00  --------------------------------------------------------  IN:    dextrose 5% + sodium chloride 0.45% with potassium chloride 20 mEq/L: 1500 mL    Solution: 200 mL  Total IN: 1700 mL    OUT:    Nasoenteral Tube: 100 mL    VAC (Vacuum Assisted Closure) System: 50 mL    Voided: 1000 mL  Total OUT: 1150 mL    Total NET: 550 mL      04 Aug 2018 07:01  -  05 Aug 2018 01:49  --------------------------------------------------------  IN:    dextrose 5% + sodium chloride 0.45% with potassium chloride 20 mEq/L: 750 mL  Total IN: 750 mL    OUT:    Colostomy: 100 mL    Voided: 600 mL  Total OUT: 700 mL    Total NET: 50 mL          Daily     Daily     LABS:                        9.5    11.23 )-----------( 452      ( 04 Aug 2018 09:05 )             29.1     08-04    134<L>  |  97  |  <4<L>  ----------------------------<  214<H>  4.5   |  25  |  0.60    Ca    8.0<L>      04 Aug 2018 07:32  Phos  2.3     08-04  Mg     1.8     08-04        Urinalysis Basic - ( 03 Aug 2018 14:15 )    Color: Yellow / Appearance: SL Turbid / S.022 / pH: x  Gluc: x / Ketone: Large  / Bili: Negative / Urobili: Negative   Blood: x / Protein: 30 mg/dL / Nitrite: Negative   Leuk Esterase: Moderate / RBC: 5-10 /HPF / WBC >50 /HPF   Sq Epi: x / Non Sq Epi: Occasional /HPF / Bacteria: x        Physical Exam:   Gen: NAD, AAOx  Abdomen: soft, NT, ND  Incision: c/d/i    Assesment:   67y Female who presents with s/p abdominoperineal proctectomy for rectal bleeding     Plan:  - continue ceftriaxone for UA (day 4 of 7)  - diet: advance to clears, assess tolerance.  -DVT PPX  -Pain control   -OOB as tolerated with assistance   -Advance diet as tolerated  -Await Gi Fxn   -Dispo Planning

## 2018-08-05 NOTE — PROGRESS NOTE ADULT - ATTENDING COMMENTS
as above-Stable at present--off abx--+ ostomy function--new temps---Likely urine based on UA (sputum c/w pseudomonas Aereginosa---sensitivities pending--would hold on rx; UTI--ceftraixone initiated 8/3  CRS- NGT re-placed--await bowel fxn return  Pulm meds-symbicort/spiriva, duoneb, singulair                  Adrenal insufficiency-medrol 4mg  ambulate--as per CRS--pain control and motility agents.   Rehab pending return of gI fxn    Eulalio Allison MD-Pulmonary   [phone] as above-Stable at present--off abx--+ ostomy function--new temps---Likely urine based on UA (sputum c/w pseudomonas Aereginosa---sensitivities pending--would hold on rx; UTI--ceftraixone initiated 8/3  CRS- NGT removed---? PO initiation as per CRS  Pulm meds-symbicort/spiriva, duoneb, singulair                  Adrenal insufficiency-medrol 4mg  ambulate--as per CRS--pain control and motility agents.   Rehab pending return of Rafa Allison MD-Pulmonary   [phone]

## 2018-08-05 NOTE — PROGRESS NOTE ADULT - SUBJECTIVE AND OBJECTIVE BOX
CHIEF COMPLAINT: f/up asthma,allergies, brochiectasis,, fevers---    Interval Events:    REVIEW OF SYSTEMS:  Constitutional: No fevers or chills. No weight loss. No fatigue or generalized malaise.  Eyes: No itching or discharge from the eyes  ENT: No ear pain. No ear discharge. No nasal congestion. No post nasal drip. No epistaxis. No throat pain. No sore throat. No difficulty swallowing.   CV: No chest pain. No palpitations. No lightheadedness or dizziness.   Resp: No dyspnea at rest. No dyspnea on exertion. No orthopnea. No wheezing. No cough. No stridor. No sputum production. No chest pain with respiration.  GI: No nausea. No vomiting. No diarrhea.  MSK: No joint pain or pain in any extremities  Integumentary: No skin lesions. No pedal edema.  Neurological: No gross motor weakness. No sensory changes.  [ ] All other systems negative  [ ] Unable to assess ROS because ________    OBJECTIVE:  ICU Vital Signs Last 24 Hrs  T(C): 37.2 (05 Aug 2018 01:30), Max: 37.8 (04 Aug 2018 21:17)  T(F): 98.9 (05 Aug 2018 01:30), Max: 100 (04 Aug 2018 21:17)  HR: 84 (05 Aug 2018 01:30) (72 - 87)  BP: 135/71 (05 Aug 2018 01:30) (105/63 - 145/75)  BP(mean): --  ABP: --  ABP(mean): --  RR: 18 (05 Aug 2018 01:30) (18 - 18)  SpO2: 94% (05 Aug 2018 01:30) (92% - 95%)         @ 07:04 @ 07:00  --------------------------------------------------------  IN: 1700 mL / OUT: 1150 mL / NET: 550 mL     @ 07:  0805 @ 05:09  --------------------------------------------------------  IN: 750 mL / OUT: 700 mL / NET: 50 mL      CAPILLARY BLOOD GLUCOSE      POCT Blood Glucose.: 167 mg/dL (04 Aug 2018 23:55)      PHYSICAL EXAM:  General: Awake, alert, oriented X 3.   HEENT: Atraumatic, normocephalic.                 Mallampatti Grade                 No nasal congestion.                No tonsillar or pharyngeal exudates.  Lymph Nodes: No palpable lymphadenopathy  Neck: No JVD. No carotid bruit.   Respiratory: Normal chest expansion                         Normal percussion                         Normal and equal air entry                         No wheeze, rhonchi or rales.  Cardiovascular: S1 S2 normal. No murmurs, rubs or gallops.   Abdomen: Soft, non-tender, non-distended. No organomegaly.  Extremities: Warm to touch. Peripheral pulse palpable. No pedal edema.   Skin: No rashes or skin lesions  Neurological: Motor and sensory examination equal and normal in all four extremities.  Psychiatry: Appropriate mood and affect.    HOSPITAL MEDICATIONS:  MEDICATIONS  (STANDING):  ALBUTerol/ipratropium for Nebulization 3 milliLiter(s) Nebulizer every 6 hours  buDESOnide 160 MICROgram(s)/formoterol 4.5 MICROgram(s) Inhaler 2 Puff(s) Inhalation two times a day  cefTRIAXone   IVPB 1 Gram(s) IV Intermittent every 24 hours  chlorhexidine 4% Liquid 1 Application(s) Topical <User Schedule>  dextrose 5% + sodium chloride 0.45% with potassium chloride 20 mEq/L 1000 milliLiter(s) (110 mL/Hr) IV Continuous <Continuous>  dextrose 5%. 1000 milliLiter(s) (50 mL/Hr) IV Continuous <Continuous>  dextrose 50% Injectable 12.5 Gram(s) IV Push once  dextrose 50% Injectable 25 Gram(s) IV Push once  dextrose 50% Injectable 25 Gram(s) IV Push once  digoxin  Injectable 0.25 milliGRAM(s) IV Push daily  enoxaparin Injectable 40 milliGRAM(s) SubCutaneous daily  hydrocortisone sodium succinate Injectable 20 milliGRAM(s) IV Push daily  insulin lispro (HumaLOG) corrective regimen sliding scale   SubCutaneous every 6 hours  ondansetron Injectable 4 milliGRAM(s) IV Push once  pantoprazole  Injectable 40 milliGRAM(s) IV Push daily  sodium chloride 0.9% Bolus 500 milliLiter(s) IV Bolus once  tiotropium 18 MICROgram(s) Capsule 1 Capsule(s) Inhalation daily    MEDICATIONS  (PRN):  ALBUTerol    90 MICROgram(s) HFA Inhaler 2 Puff(s) Inhalation every 6 hours PRN Shortness of Breath  dextrose 40% Gel 15 Gram(s) Oral once PRN Blood Glucose LESS THAN 70 milliGRAM(s)/deciliter  diphenhydrAMINE   Injectable 12.5 milliGRAM(s) IV Push every 4 hours PRN Itching  glucagon  Injectable 1 milliGRAM(s) IntraMuscular once PRN Glucose LESS THAN 70 milligrams/deciliter  metoclopramide Injectable 10 milliGRAM(s) IV Push every 6 hours PRN Nausea  morphine  - Injectable 2 milliGRAM(s) IV Push every 4 hours PRN Moderate Pain (4 - 6)  tetracaine/benzocaine/butamben Spray 1 Spray(s) Topical four times a day PRN NGT irritation      LABS:                        9.5    11.23 )-----------( 452      ( 04 Aug 2018 09:05 )             29.1     08-04    134<L>  |  97  |  <4<L>  ----------------------------<  214<H>  4.5   |  25  |  0.60    Ca    8.0<L>      04 Aug 2018 07:32  Phos  2.3     08-04  Mg     1.8     08-04        Urinalysis Basic - ( 03 Aug 2018 14:15 )    Color: Yellow / Appearance: SL Turbid / S.022 / pH: x  Gluc: x / Ketone: Large  / Bili: Negative / Urobili: Negative   Blood: x / Protein: 30 mg/dL / Nitrite: Negative   Leuk Esterase: Moderate / RBC: 5-10 /HPF / WBC >50 /HPF   Sq Epi: x / Non Sq Epi: Occasional /HPF / Bacteria: x            MICROBIOLOGY:     RADIOLOGY:  [ ] Reviewed and interpreted by me    Point of Care Ultrasound Findings:    PFT:    EKG: CHIEF COMPLAINT: f/up asthma,allergies, brochiectasis,, fevers---better--NG tube out, improved cough    Interval Events: NG tube out, ambulating well    REVIEW OF SYSTEMS:  Constitutional: No fevers or chills. No weight loss. No fatigue or generalized malaise.  Eyes: No itching or discharge from the eyes  ENT: No ear pain. No ear discharge. No nasal congestion. No post nasal drip. No epistaxis. No throat pain. No sore throat. No difficulty swallowing.   CV: No chest pain. No palpitations. No lightheadedness or dizziness.   Resp: No dyspnea at rest. + dyspnea on exertion. No orthopnea. No wheezing. + cough. No stridor. + sputum production. No chest pain with respiration.  GI: No nausea. No vomiting. No diarrhea. good bm  MSK: No joint pain or pain in any extremities  Integumentary: No skin lesions. No pedal edema.  Neurological: No gross motor weakness. No sensory changes.  [+ ] All other systems negative  [ ] Unable to assess ROS because ________    OBJECTIVE:  ICU Vital Signs Last 24 Hrs  T(C): 37.2 (05 Aug 2018 01:30), Max: 37.8 (04 Aug 2018 21:17)  T(F): 98.9 (05 Aug 2018 01:30), Max: 100 (04 Aug 2018 21:17)  HR: 84 (05 Aug 2018 01:30) (72 - 87)  BP: 135/71 (05 Aug 2018 01:30) (105/63 - 145/75)  BP(mean): --  ABP: --  ABP(mean): --  RR: 18 (05 Aug 2018 01:30) (18 - 18)  SpO2: 94% (05 Aug 2018 01:30) (92% - 95%)         @ 07:  -   @ 07:00  --------------------------------------------------------  IN: 1700 mL / OUT: 1150 mL / NET: 550 mL     @ 07:  -  05 @ 05:09  --------------------------------------------------------  IN: 750 mL / OUT: 700 mL / NET: 50 mL      CAPILLARY BLOOD GLUCOSE      POCT Blood Glucose.: 167 mg/dL (04 Aug 2018 23:55)      PHYSICAL EXAM: NAD in bed on RA  General: Awake, alert, oriented X 3.   HEENT: Atraumatic, normocephalic.                 Mallampatti Grade 2                No nasal congestion.                No tonsillar or pharyngeal exudates.  Lymph Nodes: No palpable lymphadenopathy  Neck: No JVD. No carotid bruit.   Respiratory: Normal chest expansion                         Normal percussion                         Normal and equal air entry                         No wheeze, rhonchi or rales.  Cardiovascular: S1 S2 normal. No murmurs, rubs or gallops.   Abdomen: Soft, non-tender, non-distended. No organomegaly. NABS  Extremities: Warm to touch. Peripheral pulse palpable. No pedal edema.   Skin: No rashes or skin lesions  Neurological: Motor and sensory examination equal and normal in all four extremities.  Psychiatry: Appropriate mood and affect.    HOSPITAL MEDICATIONS:  MEDICATIONS  (STANDING):  ALBUTerol/ipratropium for Nebulization 3 milliLiter(s) Nebulizer every 6 hours  buDESOnide 160 MICROgram(s)/formoterol 4.5 MICROgram(s) Inhaler 2 Puff(s) Inhalation two times a day  cefTRIAXone   IVPB 1 Gram(s) IV Intermittent every 24 hours  chlorhexidine 4% Liquid 1 Application(s) Topical <User Schedule>  dextrose 5% + sodium chloride 0.45% with potassium chloride 20 mEq/L 1000 milliLiter(s) (110 mL/Hr) IV Continuous <Continuous>  dextrose 5%. 1000 milliLiter(s) (50 mL/Hr) IV Continuous <Continuous>  dextrose 50% Injectable 12.5 Gram(s) IV Push once  dextrose 50% Injectable 25 Gram(s) IV Push once  dextrose 50% Injectable 25 Gram(s) IV Push once  digoxin  Injectable 0.25 milliGRAM(s) IV Push daily  enoxaparin Injectable 40 milliGRAM(s) SubCutaneous daily  hydrocortisone sodium succinate Injectable 20 milliGRAM(s) IV Push daily  insulin lispro (HumaLOG) corrective regimen sliding scale   SubCutaneous every 6 hours  ondansetron Injectable 4 milliGRAM(s) IV Push once  pantoprazole  Injectable 40 milliGRAM(s) IV Push daily  sodium chloride 0.9% Bolus 500 milliLiter(s) IV Bolus once  tiotropium 18 MICROgram(s) Capsule 1 Capsule(s) Inhalation daily    MEDICATIONS  (PRN):  ALBUTerol    90 MICROgram(s) HFA Inhaler 2 Puff(s) Inhalation every 6 hours PRN Shortness of Breath  dextrose 40% Gel 15 Gram(s) Oral once PRN Blood Glucose LESS THAN 70 milliGRAM(s)/deciliter  diphenhydrAMINE   Injectable 12.5 milliGRAM(s) IV Push every 4 hours PRN Itching  glucagon  Injectable 1 milliGRAM(s) IntraMuscular once PRN Glucose LESS THAN 70 milligrams/deciliter  metoclopramide Injectable 10 milliGRAM(s) IV Push every 6 hours PRN Nausea  morphine  - Injectable 2 milliGRAM(s) IV Push every 4 hours PRN Moderate Pain (4 - 6)  tetracaine/benzocaine/butamben Spray 1 Spray(s) Topical four times a day PRN NGT irritation      LABS:                        9.5    11.23 )-----------( 452      ( 04 Aug 2018 09:05 )             29.1     08-04    134<L>  |  97  |  <4<L>  ----------------------------<  214<H>  4.5   |  25  |  0.60    Ca    8.0<L>      04 Aug 2018 07:32  Phos  2.3     08-04  Mg     1.8     08-04        Urinalysis Basic - ( 03 Aug 2018 14:15 )    Color: Yellow / Appearance: SL Turbid / S.022 / pH: x  Gluc: x / Ketone: Large  / Bili: Negative / Urobili: Negative   Blood: x / Protein: 30 mg/dL / Nitrite: Negative   Leuk Esterase: Moderate / RBC: 5-10 /HPF / WBC >50 /HPF   Sq Epi: x / Non Sq Epi: Occasional /HPF / Bacteria: x            MICROBIOLOGY:     RADIOLOGY:  [ ] Reviewed and interpreted by me    Point of Care Ultrasound Findings:    PFT:    EKG:

## 2018-08-06 LAB
ANION GAP SERPL CALC-SCNC: 10 MMOL/L — SIGNIFICANT CHANGE UP (ref 5–17)
BUN SERPL-MCNC: <4 MG/DL — LOW (ref 7–23)
CALCIUM SERPL-MCNC: 8.3 MG/DL — LOW (ref 8.4–10.5)
CHLORIDE SERPL-SCNC: 100 MMOL/L — SIGNIFICANT CHANGE UP (ref 96–108)
CO2 SERPL-SCNC: 26 MMOL/L — SIGNIFICANT CHANGE UP (ref 22–31)
CREAT SERPL-MCNC: 0.6 MG/DL — SIGNIFICANT CHANGE UP (ref 0.5–1.3)
CULTURE RESULTS: SIGNIFICANT CHANGE UP
GLUCOSE BLDC GLUCOMTR-MCNC: 143 MG/DL — HIGH (ref 70–99)
GLUCOSE BLDC GLUCOMTR-MCNC: 176 MG/DL — HIGH (ref 70–99)
GLUCOSE BLDC GLUCOMTR-MCNC: 202 MG/DL — HIGH (ref 70–99)
GLUCOSE BLDC GLUCOMTR-MCNC: 298 MG/DL — HIGH (ref 70–99)
GLUCOSE SERPL-MCNC: 211 MG/DL — HIGH (ref 70–99)
HCT VFR BLD CALC: 28.4 % — LOW (ref 34.5–45)
HGB BLD-MCNC: 9.2 G/DL — LOW (ref 11.5–15.5)
MAGNESIUM SERPL-MCNC: 1.8 MG/DL — SIGNIFICANT CHANGE UP (ref 1.6–2.6)
MCHC RBC-ENTMCNC: 23.2 PG — LOW (ref 27–34)
MCHC RBC-ENTMCNC: 32.4 GM/DL — SIGNIFICANT CHANGE UP (ref 32–36)
MCV RBC AUTO: 71.7 FL — LOW (ref 80–100)
PHOSPHATE SERPL-MCNC: 2.4 MG/DL — LOW (ref 2.5–4.5)
PLATELET # BLD AUTO: 472 K/UL — HIGH (ref 150–400)
POTASSIUM SERPL-MCNC: 4.3 MMOL/L — SIGNIFICANT CHANGE UP (ref 3.5–5.3)
POTASSIUM SERPL-SCNC: 4.3 MMOL/L — SIGNIFICANT CHANGE UP (ref 3.5–5.3)
RBC # BLD: 3.96 M/UL — SIGNIFICANT CHANGE UP (ref 3.8–5.2)
RBC # FLD: 15.5 % — HIGH (ref 10.3–14.5)
SODIUM SERPL-SCNC: 136 MMOL/L — SIGNIFICANT CHANGE UP (ref 135–145)
SPECIMEN SOURCE: SIGNIFICANT CHANGE UP
WBC # BLD: 7.65 K/UL — SIGNIFICANT CHANGE UP (ref 3.8–10.5)
WBC # FLD AUTO: 7.65 K/UL — SIGNIFICANT CHANGE UP (ref 3.8–10.5)

## 2018-08-06 PROCEDURE — 99232 SBSQ HOSP IP/OBS MODERATE 35: CPT

## 2018-08-06 PROCEDURE — 99233 SBSQ HOSP IP/OBS HIGH 50: CPT

## 2018-08-06 RX ADMIN — BUDESONIDE AND FORMOTEROL FUMARATE DIHYDRATE 2 PUFF(S): 160; 4.5 AEROSOL RESPIRATORY (INHALATION) at 05:48

## 2018-08-06 RX ADMIN — MORPHINE SULFATE 2 MILLIGRAM(S): 50 CAPSULE, EXTENDED RELEASE ORAL at 15:37

## 2018-08-06 RX ADMIN — CHLORHEXIDINE GLUCONATE 1 APPLICATION(S): 213 SOLUTION TOPICAL at 09:56

## 2018-08-06 RX ADMIN — PANTOPRAZOLE SODIUM 40 MILLIGRAM(S): 20 TABLET, DELAYED RELEASE ORAL at 14:22

## 2018-08-06 RX ADMIN — Medication 2: at 16:51

## 2018-08-06 RX ADMIN — Medication 3 MILLILITER(S): at 14:22

## 2018-08-06 RX ADMIN — TIOTROPIUM BROMIDE 1 CAPSULE(S): 18 CAPSULE ORAL; RESPIRATORY (INHALATION) at 14:23

## 2018-08-06 RX ADMIN — Medication 3 MILLILITER(S): at 19:51

## 2018-08-06 RX ADMIN — Medication 1: at 20:02

## 2018-08-06 RX ADMIN — Medication 3 MILLILITER(S): at 05:47

## 2018-08-06 RX ADMIN — Medication 62.5 MILLIMOLE(S): at 20:40

## 2018-08-06 RX ADMIN — MORPHINE SULFATE 2 MILLIGRAM(S): 50 CAPSULE, EXTENDED RELEASE ORAL at 10:02

## 2018-08-06 RX ADMIN — CEFTRIAXONE 100 GRAM(S): 500 INJECTION, POWDER, FOR SOLUTION INTRAMUSCULAR; INTRAVENOUS at 19:51

## 2018-08-06 RX ADMIN — Medication 0.25 MILLIGRAM(S): at 14:23

## 2018-08-06 RX ADMIN — ENOXAPARIN SODIUM 40 MILLIGRAM(S): 100 INJECTION SUBCUTANEOUS at 14:23

## 2018-08-06 RX ADMIN — Medication 3: at 10:00

## 2018-08-06 RX ADMIN — BUDESONIDE AND FORMOTEROL FUMARATE DIHYDRATE 2 PUFF(S): 160; 4.5 AEROSOL RESPIRATORY (INHALATION) at 19:50

## 2018-08-06 RX ADMIN — Medication 20 MILLIGRAM(S): at 05:48

## 2018-08-06 NOTE — PROGRESS NOTE ADULT - PROBLEM SELECTOR PLAN 5
s/p[ surgery--optimal pain control  f/up final pathology  off IV abx as per CRS-as of 7/27---abx 8/3-ceftriaxone  NGT in place-await ostomy fxn

## 2018-08-06 NOTE — PROGRESS NOTE ADULT - SUBJECTIVE AND OBJECTIVE BOX
Consult:  · Requested by Name:	Terrell Arango	  · Date/Time:	06-Aug-2018 	  · Reason for Referral/Consultation:	Perioperative management of cardiac conditions	      · Subjective and Objective: 	  **********              CARDIOLOGY CONSULT PROGRESS NOTE              ************  ============================================================  CHIEF COMPLAINT/REASON FOR CONSULT:  Patient is a 67y old  Female who presents with a chief complaint of rectal bleeding (2018 10:00)      HISTORY OF PRESENT ILLNESS:  67yFemale with a history of Remote Asthma, DVT, TIA, COPD, Moderate AI, Normal LVEF, pAF on Digoxin/Eliquis prior to admission, Colorectal CA p/w bleeding s/p SCC resection  - presenting for SCC resection.  No CP/SOB/Palps.         ============================================================  24 hr events  - BP controlled; HR controlled  - Continues on IV Dig  - Tolerating PO  - No reported worsening CP/Palps/SOB   ============================================================      Allergies    ampicillin (Short breath)  aspirin (Short breath)  Avelox (Short breath; Pruritus)  codeine (Short breath)  Dilaudid (Short breath)  iodine (Short breath; Swelling)  penicillin (Short breath)  shellfish (Anaphylaxis)  tetanus toxoid (Short breath)  Valium (Short breath)    Intolerances    	    MEDICATIONS:  heparin  Injectable 5000 Unit(s) SubCutaneous every 8 hours    aztreonam  IVPB 2000 milliGRAM(s) IV Intermittent once  clindamycin IVPB 900 milliGRAM(s) IV Intermittent once    ALBUTerol    90 MICROgram(s) HFA Inhaler 2 Puff(s) Inhalation every 6 hours PRN  ALBUTerol/ipratropium for Nebulization 3 milliLiter(s) Nebulizer every 6 hours  diphenhydrAMINE   Injectable 12.5 milliGRAM(s) IV Push every 4 hours PRN  tiotropium 18 MICROgram(s) Capsule 1 Capsule(s) Inhalation daily    morphine PCA (5 mG/mL) 30 milliLiter(s) PCA Continuous PCA Continuous  morphine PCA (5 mG/mL) Rescue Clinician Bolus 2.5 milliGRAM(s) IV Push every 15 minutes PRN    pantoprazole  Injectable 40 milliGRAM(s) IV Push daily    dextrose 40% Gel 15 Gram(s) Oral once PRN  dextrose 50% Injectable 12.5 Gram(s) IV Push once  dextrose 50% Injectable 25 Gram(s) IV Push once  dextrose 50% Injectable 25 Gram(s) IV Push once  glucagon  Injectable 1 milliGRAM(s) IntraMuscular once PRN  insulin lispro (HumaLOG) corrective regimen sliding scale   SubCutaneous every 6 hours    dextrose 5% + sodium chloride 0.45%. 1000 milliLiter(s) IV Continuous <Continuous>  dextrose 5%. 1000 milliLiter(s) IV Continuous <Continuous>      PAST MEDICAL & SURGICAL HISTORY:  TIA (transient ischemic attack): multiple, last 5 years ago - presents as right-sided weakness  DVT (deep venous thrombosis): 15-20 years ago, took coumadin  Seizure: x 1 18  Rectal bleeding  Colorectal cancer: 2018- last treatment , chemo and radiation  Tracheobronchomalacia: diagnosed , s/p bronchial thermoplasty  (Dr Zapien); recent bronchoscopy 2018 revealed no evidence of tracheobronchomalacia in trachea or bronchial tubes  Asthma  Pelvic fracture  Aortic insufficiency: moderate AR on echo 5/3/2018  Adrenal insufficiency: Medrol daily for over 50 years  COPD (chronic obstructive pulmonary disease)  Diabetes: Type 2  Atrial fibrillation: paroxysmal, on eliquis  Rectal bleeding: exam under anesthesia ([facility]) 2018  S/P bronchoscopy: 2018 - [facility] (Dr Zapien) no evidence of tracheobronchomalacia in trachea or bronchial tubes  History of tracheomalacia:  - attempted tracheal stenting ([facility])- course complicated by obstruction, respiratory failure, multiple CPR attempts -  stent discontinued; 10/20/2016 Tracheobronchoplasty (Prolene Mesh) performed at [facility] by Dr Zapien  H/O pelvic surgery: 5 years ago - s/p fracture  Exostosis of orbit, 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  History of sinus surgery: multiple sinus surgeries  H/O total knee replacement, bilateral: 5 years ago  History of partial hysterectomy: 30 years ago - fibroids      FAMILY HISTORY:  Family history of diabetes mellitus type II  Family history of breast cancer (Sibling)  Family history of asthma    NC -   Mother - HTN  Father - DM    SOCIAL HISTORY:    [ x] Non-smoker  [x] No Illicit Drug Use  [ x] No Excess EtOH Use      REVIEW OF SYSTEMS: (Unless + Before Symptom, it is negative)  Constitutional: No Fever, +Fatigue, Weight Changes  Eyes: No Recent Vision Changes, Eye Pain  ENT: No Congestion, Sore Throat  Endocrine: No Excess Sweating, Temperature Intolerance  Cardiovascular: No Chest Pain, Palpitations, Shortness of Breath, Pre-syncope, Syncope, LE Edema  Respiratory: No Cough, Congestion, Wheezing  Gastrointestinal: +Abdominal Pain, Nausea, Vomiting  Genitourinary: No dysuria, hematuria  Musculoskeletal: No Joint Pain, Swelling  Neurologic: No headaches, Imbalance, Weakness  Skin: No rashes, hematoma, purprura    ================================    PHYSICAL EXAM:  Vital Signs Last 24 Hrs  T(C): 36.8 (01 Aug 2018 16:33), Max: 39.5 (2018 21:00)  T(F): 98.2 (01 Aug 2018 16:33), Max: 103.1 (2018 21:00)  HR: 70 (01 Aug 2018 16:33) (70 - 101)  BP: 117/64 (01 Aug 2018 16:33) (95/62 - 143/83)  BP(mean): --  RR: 18 (01 Aug 2018 16:33) (10 - 18)  SpO2: 96% (01 Aug 2018 16:33) (94% - 98%)    Appearance: Normal; NAD	  HEENT:   Normal oral mucosa, EOMI	  Lymphatic: No lymphadenopathy  Cardiovascular: Normal S1 S2, No JVD, No murmurs, No edema  Respiratory: Lungs clear to auscultation, no use of accessory muscles	  Psychiatry: A & O x 3, Mood & affect appropriate  Gastrointestinal:  Soft, +Distended +Less Tender +Ostomy  Skin: No rashes, No ecchymoses, No cyanosis	  Neurologic: Non-focal, No Focal Deficits  Extremities: Normal range of motion, No clubbing, cyanosis or edema  Vascular: Peripheral pulses palpable 2+ bilaterally, no prominent varicosities    ============================    LABS:	   Labs [id number]    CBC Full  -  ( 2018 04:32 )  WBC Count : 14.8 K/uL  Hemoglobin : 11.4 g/dL  Hematocrit : 37.5 %  Platelet Count - Automated : 201 K/uL  Mean Cell Volume : 76.1 fl  Mean Cell Hemoglobin : 23.1 pg  Mean Cell Hemoglobin Concentration : 30.3 gm/dL  Auto Neutrophil # : x  Auto Lymphocyte # : x  Auto Monocyte # : x  Auto Eosinophil # : x  Auto Basophil # : x  Auto Neutrophil % : x  Auto Lymphocyte % : x  Auto Monocyte % : x  Auto Eosinophil % : x  Auto Basophil % : x    07-    142  |  106  |  10  ----------------------------<  191<H>  4.6   |  26  |  0.77  07-25    140  |  104  |  9   ----------------------------<  201<H>  4.4   |  25  |  0.73    Ca    8.2<L>      2018 04:32  Ca    8.2<L>      2018 00:16  Phos  4.2     07-25  Phos  4.1     07-25  Mg     2.1     -25  Mg     2.0     -25          =========================================================================  CXR:  < from: Xray Chest 1 View- PORTABLE-Urgent (18 @ 16:02) >    EXAM:  XR CHEST PORTABLE URGENT 1V                          PROCEDURE DATE:  2018          INTERPRETATION:  Portable Chest X-Ray dated 2018 4:02 PM    Indication: s/p bronch    Prior studies: 2018    An AP portable view of the chest revealed cardiomegaly. Trachea midline.   No pneumothorax seen. Trace right pleural effusion. Increased markings in   the left lower lobe. Right Port-A-Cath with the tip in the right atrium.    IMPRESSION:  No pneumothorax seen. Trace right pleural effusion.             "Thank you for the opportunity to participate in the care of this   patient."    < end of copied text >      ECG:  	    PREVIOUS DIAGNOSTIC TESTING:    [X] Echocardiogram:  < from: Echocardiogram (18 @ 10:55) >      INTERPRETATION:  Patient Height: 170.2 cm  Patient Weight: 73.0 kg  Heart Rate: 70 bpm  Systolic Pressure: 132 mmHg  Diastolic Pressure: 70 mmHg  BSA: 1.8 m^2  Interpretation Summary  The left atrial size is normal. Right atrial size is normal.There is mild   aortic valve thickening. The aortic valve is trileaflet. There is   moderate   aortic regurgitation. No hemodynamically significantvalvular aortic   stenosis.    There is mild mitral valve thickening. There is mild mitral annular   calcification. There is trace mitral regurgitation.  Structurally normal   tricuspid valve. There is trace tricuspid regurgitation.There was   insufficient   TR detected from which to calculate pulmonary artery systolic pressure.    The   pulmonic valve is not well visualized. No pulmonic regurgitation   noted.The   right ventricle is normal in size and function.There is moderate   concentric   left ventricular hypertrophy. The left ventricular wall motion is   normal. The   left ventricular ejection fraction is 63%.  No aortic root   dilatation.There is   no pericardial effusion.When compared to prior study performed on   10/21/16,   there isno significant change.  Procedure Details  A complete two-dimensional transthoracic echocardiogram was performed (2D,  M-mode, spectral and color flow doppler).  Study Quality: Fair.  Left Ventricle  There is moderate concentric left ventricular hypertrophy.  The left ventricular wall motion is normal.  The left ventricular ejection fraction is 63%.  Left Atrium  The left atrial size is normal.  Right Atrium  Right atrial size is normal.  Right Ventricle  The right ventricle is normal in size andfunction.  Aortic Valve  There is mild aortic valve thickening.  The aortic valve is trileaflet.  There is moderate aortic regurgitation.  No hemodynamically significant valvular aortic stenosis.  Mitral Valve  There is mild mitral valve thickening.  There is mild mitral annular calcification.  There is trace mitral regurgitation.  Tricuspid Valve  Structurally normal tricuspid valve.  There was insufficient TR detected from which to calculate pulmonary   artery  systolic pressure.  There is trace tricuspid regurgitation.  Pulmonic Valve  The pulmonic valve is not well visualized.  No pulmonic regurgitation noted.  Arteries and Venous System  No aortic root dilatation.  The inferior vena cava is normal in size (<2.1 cm) with normal inspiratory  collapse (>50%) consistent with normal right atrial pressure.  Pericardium / Pleura  There is no pericardial effusion.  Doppler Measurements & Calculations  MV E point: 67.9 cm/sec  MV A point: 91.3 cm/sec  MV E/A: 0.7  MV dec time: 0.2 sec  Ao V2 max: 172.4 cm/sec  Ao max P.9 mmHg  Ao max PG (full): 5.5 mmHg  SUSANA(V,A): 2.4 cm^2  SUSANA(V,D): 2.4 cm^2  AI max lynette: 399.1 cm/sec  AI max P.7 mmHg  AI dec slope: 275.9 cm/sec^2  AI P1/2t: 423.7 msec  LV max P.4 mmHg  LV V1 max: 126.5 cm/sec  MMode 2D Measurements & Calculations  IVSd: 1.3 cm  LVIDd: 4.2 cm  LVIDs: 2.9 cm  LVPWd: 1.1 cm  IVS/LVPW: 1.2  FS: 31.3 %  EDV(Teich): 80.1 ml  ESV(Teich): 32.5 ml  LV mass(C)d: 180.5 grams  LV mass(C)dI: 97.9 grams/m^2  SI(cubed): 27.8 ml/m^2  Ao root diam: 3.3 cm  Ao root area: 8.8 cm^2  LA dimension: 4.6 cm  LA/Ao: 1.4  LVOT diam: 2.0 cm  LVOT area: 3.2 cm^2  LVOT area (M): 3.8 cm^2  LVLd ap4: 7.6 cm  EDV(MOD-sp4): 70 ml  LVLs ap4: 6.6 cm  ESV(MOD-sp4): 22 ml  EF(MOD-sp4): 68.6 %  LVLd ap2: 7.4 cm  EDV(MOD-sp2): 43 ml  LVLs ap2: 5.9 cm  ESV(MOD-sp2): 16 ml  EF(MOD-sp2): 62.8 %  SV(MOD-sp4): 48 ml  SI(MOD-sp4): 26.0 ml/m^2  SV(MOD-sp2): 27 ml  SI(MOD-sp2): 14.6 ml/m^2  Interpreting Physician:OMAR Dias electronically signed on   2018 12:45:03        =========================================================================  ASSESSMENT:    67yFemale with a history of Remote Asthma, DVT, TIA, COPD, Moderate AI, Normal LVEF, pAF on Digoxin/Eliquis prior to admission, Colorectal CA p/w bleeding s/p SCC resection POD#1  - presenting for SCC resection.    Recs  - Restart Apixaban unless contraindication  - Continue Digoxin -> Switch to PO  - Hold Diovan  - Careful with fluid load given AI  - Thank you for this consult.  - Will Follow      Please call with questions.     Baron Tristan MD, [facility]  [phone]

## 2018-08-06 NOTE — PROGRESS NOTE ADULT - ASSESSMENT
67 yr F with PMH of COPD/asthma, tracheobronchomalacia s/p tracheo-bronchoplasty in 10/16, Afib on Eliquis , Adrenal Insufficieny on steroids,  DM2, HTN, Squamous cell CA of the anus on chemo/XRT, now s/p elective abdominoperineal proctectomy for recurrent exophytic anal cancer on 7/24/18    1. Cough:  - Currently stable pulm status  -  Sputum Cx--pseudomonas aereginosa    -  Off Antibiotic regimen of Aztreonam and Clindamycin as of 7/27 ---now on abx for UTI (ceftriaxone 8/3)  - On nebulized hypertonic saline Q12H (preceded by Duonebs) to assist with mobilization of secretions  - Cont close monitoring of resp symptoms. Optimize pain to prevent resp splinting  - Incentive spirometry/ acapella use/ OBC and continue mobilization    2. COPD/ Asthma/ Tracheomalacia s/p tracheobronchoplasty- on Duonebs Q6H  - continue Symbicort BID (takes Breo ellipta at home which is non formularly)  - continue Spiriva daily  - Once tolerating PO intake, can restart home dose of Singulair daily  - Supplemental O2 to keep sats > 90 %    SEE below as well--NGT out 7/31; 8/1-fever--cxr--c/w prior films-sputum c/w pseudomonas Aereginosa  8/3-ceftriaxone for UTI 67 yr F with PMH of COPD/asthma, tracheobronchomalacia s/p tracheo-bronchoplasty in 10/16, Afib on Eliquis , Adrenal Insufficieny on steroids,  DM2, HTN, Squamous cell CA of the anus on chemo/XRT, now s/p elective abdominoperineal proctectomy for recurrent exophytic anal cancer on 7/24/18    1. Cough:  - Currently stable pulm status  -  Sputum Cx--pseudomonas aereginosa    -  Off Antibiotic (GI) regimen of Aztreonam and Clindamycin as of 7/27 ---now on abx for UTI (ceftriaxone 8/3)  - On nebulized hypertonic saline Q12H (preceded by Duonebs) to assist with mobilization of secretions  - Cont close monitoring of resp symptoms. Optimize pain to prevent resp splinting  - Incentive spirometry/ acapella use/ OBC and continue mobilization    2. COPD/ Asthma/ Tracheomalacia s/p tracheobronchoplasty- on Duonebs Q6H  - continue Symbicort BID (takes Breo ellipta at home which is non formularly)  - continue Spiriva daily  - Once tolerating PO intake, can restart home dose of Singulair daily  - Supplemental O2 to keep sats > 90 %    SEE below as well--NGT out 7/31; 8/1-fever--cxr--c/w prior films-sputum c/w pseudomonas Aereginosa  8/3-ceftriaxone for UTI

## 2018-08-06 NOTE — CHART NOTE - NSCHARTNOTEFT_GEN_A_CORE
Source: Patient [ ]    Family [ ]     other [X ] Medical record, RN, pt sleeping soundly at time of RD visit     Diet : Diabetic Clear Liquid    Pt seen for nutrition follow up. Medical chart reviewed/events noted. Pt with malignant neoplasm of anal canal s/p abdominoperineal proctectomy for rectal bleeding 7/24. Per RN consuming most of clear liquid tray, no GI distress. Ostomy output: 75ml x 24 hours. Plan to advance diet to Consistent CHO Low fiber.        PO intake:  < 50% [ ] 50-75% [ ]   % [X ]  other :     Source for PO intake [ ] Patient [ ] family [ ] chart [x ] staff [ ] other      Current Weight: No new weight to asses  % Weight Change    Pertinent Medications: MEDICATIONS  (STANDING):  ALBUTerol/ipratropium for Nebulization 3 milliLiter(s) Nebulizer every 6 hours  buDESOnide 160 MICROgram(s)/formoterol 4.5 MICROgram(s) Inhaler 2 Puff(s) Inhalation two times a day  cefTRIAXone   IVPB 1 Gram(s) IV Intermittent every 24 hours  chlorhexidine 4% Liquid 1 Application(s) Topical <User Schedule>  dextrose 5% + sodium chloride 0.45% with potassium chloride 20 mEq/L 1000 milliLiter(s) (110 mL/Hr) IV Continuous <Continuous>  dextrose 5%. 1000 milliLiter(s) (50 mL/Hr) IV Continuous <Continuous>  dextrose 50% Injectable 12.5 Gram(s) IV Push once  dextrose 50% Injectable 25 Gram(s) IV Push once  dextrose 50% Injectable 25 Gram(s) IV Push once  digoxin  Injectable 0.25 milliGRAM(s) IV Push daily  enoxaparin Injectable 40 milliGRAM(s) SubCutaneous daily  hydrocortisone sodium succinate Injectable 20 milliGRAM(s) IV Push daily  insulin lispro (HumaLOG) corrective regimen sliding scale   SubCutaneous three times a day before meals  insulin lispro (HumaLOG) corrective regimen sliding scale   SubCutaneous at bedtime  ondansetron Injectable 4 milliGRAM(s) IV Push once  pantoprazole  Injectable 40 milliGRAM(s) IV Push daily  sodium phosphate IVPB 15 milliMole(s) IV Intermittent once  tiotropium 18 MICROgram(s) Capsule 1 Capsule(s) Inhalation daily    MEDICATIONS  (PRN):  ALBUTerol    90 MICROgram(s) HFA Inhaler 2 Puff(s) Inhalation every 6 hours PRN Shortness of Breath  dextrose 40% Gel 15 Gram(s) Oral once PRN Blood Glucose LESS THAN 70 milliGRAM(s)/deciliter  diphenhydrAMINE   Injectable 12.5 milliGRAM(s) IV Push every 4 hours PRN Itching  glucagon  Injectable 1 milliGRAM(s) IntraMuscular once PRN Glucose LESS THAN 70 milligrams/deciliter  metoclopramide Injectable 10 milliGRAM(s) IV Push every 6 hours PRN Nausea  morphine  - Injectable 2 milliGRAM(s) IV Push every 4 hours PRN Moderate Pain (4 - 6)  tetracaine/benzocaine/butamben Spray 1 Spray(s) Topical four times a day PRN NGT irritation    Pertinent Labs:  08-06 Na136 mmol/L Glu 211 mg/dL<H> K+ 4.3 mmol/L Cr  0.60 mg/dL BUN <4 mg/dL<L> 08-06 Phos 2.4 mg/dL<L> 07-18 [id number] 7.4 %<H>      Skin: No edema. No pressure ulcers documented.     Estimated Needs:   [ X] no change since previous assessment  [ ] recalculated:       Previous Nutrition Diagnosis:     [X ] Inadequate Protein Energy Intake       Nutrition Diagnosis is [X ] ongoing - to be addressed with diet advancement          New Nutrition Diagnosis: [X ] not applicable      Recommend    1) Medical team to advance diet as tolerated to consistent CHO low fiber  2)  Provide food preferences as requested by Pt/family within diet restrictions and continue to encourage PO intake during meal times      Monitoring and Evaluation:   1.Continue to monitor weight, diet advancement, labs, and GI tolerance.

## 2018-08-06 NOTE — PROGRESS NOTE ADULT - ATTENDING COMMENTS
as above-Stable at present--off abx--+ ostomy function--new temps---Likely urine based on UA (sputum c/w pseudomonas Aereginosa---sensitivities pending--would hold on rx; UTI--ceftraixone initiated 8/3  CRS- NGT removed---? PO initiation as per CRS  Pulm meds-symbicort/spiriva, duoneb, singulair                  Adrenal insufficiency-medrol 4mg  ambulate--as per CRS--pain control and motility agents.   Rehab pending return of Rafa Allison MD-Pulmonary   [phone] as above-Stable at present--off abx--+ ostomy function-- temps--(controlled) -Likely urine based on UA (sputum c/w pseudomonas Aereginosa---sensitivities pending--would hold on rx; UTI--ceftraixone initiated 8/3  CRS- NGT removed---? PO initiation as per CRS  Pulm meds-symbicort/spiriva, duoneb, singulair                  Adrenal insufficiency-medrol 4mg  ambulate--as per CRS--pain control and motility agents.   Rehab pending return of full GI fxn.    Eulalio Allison MD-Pulmonary   [phone]

## 2018-08-06 NOTE — PROGRESS NOTE ADULT - SUBJECTIVE AND OBJECTIVE BOX
GENERAL SURGERY DAILY PROGRESS NOTE:     Subjective: Patient seen and examined. Patient stable with no overnight events. Patient denies CP/ SOB/ Palpatations. Patient denies N/V. Reports No flatus and No BM.     MEDICATIONS  (STANDING):  ALBUTerol/ipratropium for Nebulization 3 milliLiter(s) Nebulizer every 6 hours  buDESOnide 160 MICROgram(s)/formoterol 4.5 MICROgram(s) Inhaler 2 Puff(s) Inhalation two times a day  cefTRIAXone   IVPB 1 Gram(s) IV Intermittent every 24 hours  chlorhexidine 4% Liquid 1 Application(s) Topical <User Schedule>  dextrose 5% + sodium chloride 0.45% with potassium chloride 20 mEq/L 1000 milliLiter(s) (110 mL/Hr) IV Continuous <Continuous>  dextrose 5%. 1000 milliLiter(s) (50 mL/Hr) IV Continuous <Continuous>  dextrose 50% Injectable 12.5 Gram(s) IV Push once  dextrose 50% Injectable 25 Gram(s) IV Push once  dextrose 50% Injectable 25 Gram(s) IV Push once  digoxin  Injectable 0.25 milliGRAM(s) IV Push daily  enoxaparin Injectable 40 milliGRAM(s) SubCutaneous daily  hydrocortisone sodium succinate Injectable 20 milliGRAM(s) IV Push daily  insulin lispro (HumaLOG) corrective regimen sliding scale   SubCutaneous three times a day before meals  insulin lispro (HumaLOG) corrective regimen sliding scale   SubCutaneous at bedtime  ondansetron Injectable 4 milliGRAM(s) IV Push once  pantoprazole  Injectable 40 milliGRAM(s) IV Push daily  tiotropium 18 MICROgram(s) Capsule 1 Capsule(s) Inhalation daily    MEDICATIONS  (PRN):  ALBUTerol    90 MICROgram(s) HFA Inhaler 2 Puff(s) Inhalation every 6 hours PRN Shortness of Breath  dextrose 40% Gel 15 Gram(s) Oral once PRN Blood Glucose LESS THAN 70 milliGRAM(s)/deciliter  diphenhydrAMINE   Injectable 12.5 milliGRAM(s) IV Push every 4 hours PRN Itching  glucagon  Injectable 1 milliGRAM(s) IntraMuscular once PRN Glucose LESS THAN 70 milligrams/deciliter  metoclopramide Injectable 10 milliGRAM(s) IV Push every 6 hours PRN Nausea  morphine  - Injectable 2 milliGRAM(s) IV Push every 4 hours PRN Moderate Pain (4 - 6)  tetracaine/benzocaine/butamben Spray 1 Spray(s) Topical four times a day PRN NGT irritation      Vital Signs Last 24 Hrs  T(C): 37.2 (06 Aug 2018 04:26), Max: 37.4 (05 Aug 2018 21:11)  T(F): 99 (06 Aug 2018 04:26), Max: 99.3 (05 Aug 2018 21:11)  HR: 79 (06 Aug 2018 04:26) (73 - 83)  BP: 132/72 (06 Aug 2018 04:26) (103/62 - 134/72)  BP(mean): --  RR: 18 (06 Aug 2018 04:26) (18 - 18)  SpO2: 94% (06 Aug 2018 04:26) (93% - 95%)    I&O's Detail    05 Aug 2018 07:01  -  06 Aug 2018 07:00  --------------------------------------------------------  IN:    dextrose 5% + sodium chloride 0.45% with potassium chloride 20 mEq/L: 2530 mL    Oral Fluid: 680 mL    Solution: 100 mL  Total IN: 3310 mL    OUT:    Colostomy: 75 mL    Voided: 2050 mL  Total OUT: 2125 mL    Total NET: 1185 mL      LABS:                        8.7    8.40  )-----------( 413      ( 05 Aug 2018 08:38 )             26.8     08-05    134<L>  |  97  |  <4<L>  ----------------------------<  156<H>  3.9   |  25  |  0.61    Ca    7.9<L>      05 Aug 2018 07:08  Phos  2.8     08-05  Mg     1.8     08-05      Physical Exam:   Gen: NAD, AAOx3  Abdomen: soft, NT, ND   Incision: clean/dry/intact  Ostomy patent, and viable     Assessment   67y Female who presents with Malignant neoplasm of anal canal    Plan:  -DVT PPX  -Pain control   -OOB as tolerated with assistance   -Advance diet as tolerated  -Await Gi Fxn   -Dispo Planning     Marry Valadez   #[id number] 08-06-18 @ 07:26

## 2018-08-06 NOTE — PROGRESS NOTE ADULT - SUBJECTIVE AND OBJECTIVE BOX
CHIEF COMPLAINT: fup for sob, asthma, bronchiectasis/chronic bronchitis, tracheomalacia    Interval Events:    REVIEW OF SYSTEMS:  Constitutional: No fevers or chills. No weight loss. No fatigue or generalized malaise.  Eyes: No itching or discharge from the eyes  ENT: No ear pain. No ear discharge. No nasal congestion. No post nasal drip. No epistaxis. No throat pain. No sore throat. No difficulty swallowing.   CV: No chest pain. No palpitations. No lightheadedness or dizziness.   Resp: No dyspnea at rest. No dyspnea on exertion. No orthopnea. No wheezing. No cough. No stridor. No sputum production. No chest pain with respiration.  GI: No nausea. No vomiting. No diarrhea.  MSK: No joint pain or pain in any extremities  Integumentary: No skin lesions. No pedal edema.  Neurological: No gross motor weakness. No sensory changes.  [ ] All other systems negative  [ ] Unable to assess ROS because ________    OBJECTIVE:  ICU Vital Signs Last 24 Hrs  T(C): 37.2 (06 Aug 2018 04:26), Max: 37.4 (05 Aug 2018 06:26)  T(F): 99 (06 Aug 2018 04:26), Max: 99.3 (05 Aug 2018 06:26)  HR: 79 (06 Aug 2018 04:26) (73 - 83)  BP: 132/72 (06 Aug 2018 04:26) (103/62 - 134/72)  BP(mean): --  ABP: --  ABP(mean): --  RR: 18 (06 Aug 2018 04:26) (18 - 18)  SpO2: 94% (06 Aug 2018 04:26) (93% - 95%)        08-04 @ 07:01  -  08-05 @ 07:00  --------------------------------------------------------  IN: 1650 mL / OUT: 1160 mL / NET: 490 mL    08-05 @ 07:01 - 08-06 @ 05:03  --------------------------------------------------------  IN: 3190 mL / OUT: 1925 mL / NET: 1265 mL      CAPILLARY BLOOD GLUCOSE      POCT Blood Glucose.: 189 mg/dL (05 Aug 2018 21:17)      PHYSICAL EXAM:  General: Awake, alert, oriented X 3.   HEENT: Atraumatic, normocephalic.                 Mallampatti Grade                 No nasal congestion.                No tonsillar or pharyngeal exudates.  Lymph Nodes: No palpable lymphadenopathy  Neck: No JVD. No carotid bruit.   Respiratory: Normal chest expansion                         Normal percussion                         Normal and equal air entry                         No wheeze, rhonchi or rales.  Cardiovascular: S1 S2 normal. No murmurs, rubs or gallops.   Abdomen: Soft, non-tender, non-distended. No organomegaly.  Extremities: Warm to touch. Peripheral pulse palpable. No pedal edema.   Skin: No rashes or skin lesions  Neurological: Motor and sensory examination equal and normal in all four extremities.  Psychiatry: Appropriate mood and affect.    HOSPITAL MEDICATIONS:  MEDICATIONS  (STANDING):  ALBUTerol/ipratropium for Nebulization 3 milliLiter(s) Nebulizer every 6 hours  buDESOnide 160 MICROgram(s)/formoterol 4.5 MICROgram(s) Inhaler 2 Puff(s) Inhalation two times a day  cefTRIAXone   IVPB 1 Gram(s) IV Intermittent every 24 hours  chlorhexidine 4% Liquid 1 Application(s) Topical <User Schedule>  dextrose 5% + sodium chloride 0.45% with potassium chloride 20 mEq/L 1000 milliLiter(s) (110 mL/Hr) IV Continuous <Continuous>  dextrose 5%. 1000 milliLiter(s) (50 mL/Hr) IV Continuous <Continuous>  dextrose 50% Injectable 12.5 Gram(s) IV Push once  dextrose 50% Injectable 25 Gram(s) IV Push once  dextrose 50% Injectable 25 Gram(s) IV Push once  digoxin  Injectable 0.25 milliGRAM(s) IV Push daily  enoxaparin Injectable 40 milliGRAM(s) SubCutaneous daily  hydrocortisone sodium succinate Injectable 20 milliGRAM(s) IV Push daily  insulin lispro (HumaLOG) corrective regimen sliding scale   SubCutaneous three times a day before meals  insulin lispro (HumaLOG) corrective regimen sliding scale   SubCutaneous at bedtime  ondansetron Injectable 4 milliGRAM(s) IV Push once  pantoprazole  Injectable 40 milliGRAM(s) IV Push daily  tiotropium 18 MICROgram(s) Capsule 1 Capsule(s) Inhalation daily    MEDICATIONS  (PRN):  ALBUTerol    90 MICROgram(s) HFA Inhaler 2 Puff(s) Inhalation every 6 hours PRN Shortness of Breath  dextrose 40% Gel 15 Gram(s) Oral once PRN Blood Glucose LESS THAN 70 milliGRAM(s)/deciliter  diphenhydrAMINE   Injectable 12.5 milliGRAM(s) IV Push every 4 hours PRN Itching  glucagon  Injectable 1 milliGRAM(s) IntraMuscular once PRN Glucose LESS THAN 70 milligrams/deciliter  metoclopramide Injectable 10 milliGRAM(s) IV Push every 6 hours PRN Nausea  morphine  - Injectable 2 milliGRAM(s) IV Push every 4 hours PRN Moderate Pain (4 - 6)  tetracaine/benzocaine/butamben Spray 1 Spray(s) Topical four times a day PRN NGT irritation      LABS:                        8.7    8.40  )-----------( 413      ( 05 Aug 2018 08:38 )             26.8     08-05    134<L>  |  97  |  <4<L>  ----------------------------<  156<H>  3.9   |  25  |  0.61    Ca    7.9<L>      05 Aug 2018 07:08  Phos  2.8     08-05  Mg     1.8     08-05                MICROBIOLOGY:     RADIOLOGY:  [ ] Reviewed and interpreted by me    Point of Care Ultrasound Findings:    PFT:    EKG: CHIEF COMPLAINT: fup for sob, asthma, bronchiectasis/chronic bronchitis, tracheomalacia- better over all-less cough and sob, still pain present; burn on urination present    Interval Events: ambulating    REVIEW OF SYSTEMS:  Constitutional: No fevers or chills. No weight loss. + fatigue or generalized malaise.  Eyes: No itching or discharge from the eyes  ENT: No ear pain. No ear discharge. No nasal congestion. No post nasal drip. No epistaxis. No throat pain. No sore throat. No difficulty swallowing.   CV: No chest pain. No palpitations. No lightheadedness or dizziness.   Resp: No dyspnea at rest. + dyspnea on exertion. No orthopnea. No wheezing. + cough. No stridor. + sputum production. No chest pain with respiration.  GI: No nausea. No vomiting. No diarrhea.  MSK: No joint pain or pain in any extremities  Integumentary: No skin lesions. No pedal edema.  Neurological: No gross motor weakness. No sensory changes.  [+ ] All other systems negative  [ ] Unable to assess ROS because ________    OBJECTIVE:  ICU Vital Signs Last 24 Hrs  T(C): 37.2 (06 Aug 2018 04:26), Max: 37.4 (05 Aug 2018 06:26)  T(F): 99 (06 Aug 2018 04:26), Max: 99.3 (05 Aug 2018 06:26)  HR: 79 (06 Aug 2018 04:26) (73 - 83)  BP: 132/72 (06 Aug 2018 04:26) (103/62 - 134/72)  BP(mean): --  ABP: --  ABP(mean): --  RR: 18 (06 Aug 2018 04:26) (18 - 18)  SpO2: 94% (06 Aug 2018 04:26) (93% - 95%)        08-04 @ 07:01  -  08-05 @ 07:00  --------------------------------------------------------  IN: 1650 mL / OUT: 1160 mL / NET: 490 mL    08-05 @ 07:01  -  08-06 @ 05:03  --------------------------------------------------------  IN: 3190 mL / OUT: 1925 mL / NET: 1265 mL      CAPILLARY BLOOD GLUCOSE      POCT Blood Glucose.: 189 mg/dL (05 Aug 2018 21:17)      PHYSICAL EXAM: NAD in bed  General: Awake, alert, oriented X 3.   HEENT: Atraumatic, normocephalic.                 Mallampatti Grade                 No nasal congestion.                No tonsillar or pharyngeal exudates.  Lymph Nodes: No palpable lymphadenopathy  Neck: No JVD. No carotid bruit.   Respiratory: Normal chest expansion                         Normal percussion                         Normal and equal air entry                         No wheeze or  rales. Rare rhonchi  Cardiovascular: S1 S2 normal. No murmurs, rubs or gallops.   Abdomen: Soft, +tender, non-distended. No organomegaly. NABS  Extremities: Warm to touch. Peripheral pulse palpable. No pedal edema.   Skin: No rashes or skin lesions  Neurological: Motor and sensory examination equal and normal in all four extremities.  Psychiatry: Appropriate mood and affect.    HOSPITAL MEDICATIONS:  MEDICATIONS  (STANDING):  ALBUTerol/ipratropium for Nebulization 3 milliLiter(s) Nebulizer every 6 hours  buDESOnide 160 MICROgram(s)/formoterol 4.5 MICROgram(s) Inhaler 2 Puff(s) Inhalation two times a day  cefTRIAXone   IVPB 1 Gram(s) IV Intermittent every 24 hours  chlorhexidine 4% Liquid 1 Application(s) Topical <User Schedule>  dextrose 5% + sodium chloride 0.45% with potassium chloride 20 mEq/L 1000 milliLiter(s) (110 mL/Hr) IV Continuous <Continuous>  dextrose 5%. 1000 milliLiter(s) (50 mL/Hr) IV Continuous <Continuous>  dextrose 50% Injectable 12.5 Gram(s) IV Push once  dextrose 50% Injectable 25 Gram(s) IV Push once  dextrose 50% Injectable 25 Gram(s) IV Push once  digoxin  Injectable 0.25 milliGRAM(s) IV Push daily  enoxaparin Injectable 40 milliGRAM(s) SubCutaneous daily  hydrocortisone sodium succinate Injectable 20 milliGRAM(s) IV Push daily  insulin lispro (HumaLOG) corrective regimen sliding scale   SubCutaneous three times a day before meals  insulin lispro (HumaLOG) corrective regimen sliding scale   SubCutaneous at bedtime  ondansetron Injectable 4 milliGRAM(s) IV Push once  pantoprazole  Injectable 40 milliGRAM(s) IV Push daily  tiotropium 18 MICROgram(s) Capsule 1 Capsule(s) Inhalation daily    MEDICATIONS  (PRN):  ALBUTerol    90 MICROgram(s) HFA Inhaler 2 Puff(s) Inhalation every 6 hours PRN Shortness of Breath  dextrose 40% Gel 15 Gram(s) Oral once PRN Blood Glucose LESS THAN 70 milliGRAM(s)/deciliter  diphenhydrAMINE   Injectable 12.5 milliGRAM(s) IV Push every 4 hours PRN Itching  glucagon  Injectable 1 milliGRAM(s) IntraMuscular once PRN Glucose LESS THAN 70 milligrams/deciliter  metoclopramide Injectable 10 milliGRAM(s) IV Push every 6 hours PRN Nausea  morphine  - Injectable 2 milliGRAM(s) IV Push every 4 hours PRN Moderate Pain (4 - 6)  tetracaine/benzocaine/butamben Spray 1 Spray(s) Topical four times a day PRN NGT irritation      LABS:                        8.7    8.40  )-----------( 413      ( 05 Aug 2018 08:38 )             26.8     08-05    134<L>  |  97  |  <4<L>  ----------------------------<  156<H>  3.9   |  25  |  0.61    Ca    7.9<L>      05 Aug 2018 07:08  Phos  2.8     08-05  Mg     1.8     08-05                MICROBIOLOGY:     RADIOLOGY:  [ ] Reviewed and interpreted by me    Point of Care Ultrasound Findings:    PFT:    EKG:

## 2018-08-07 LAB
ANION GAP SERPL CALC-SCNC: 11 MMOL/L — SIGNIFICANT CHANGE UP (ref 5–17)
BUN SERPL-MCNC: <4 MG/DL — LOW (ref 7–23)
CALCIUM SERPL-MCNC: 7.9 MG/DL — LOW (ref 8.4–10.5)
CHLORIDE SERPL-SCNC: 98 MMOL/L — SIGNIFICANT CHANGE UP (ref 96–108)
CO2 SERPL-SCNC: 24 MMOL/L — SIGNIFICANT CHANGE UP (ref 22–31)
CREAT SERPL-MCNC: 0.6 MG/DL — SIGNIFICANT CHANGE UP (ref 0.5–1.3)
GLUCOSE BLDC GLUCOMTR-MCNC: 140 MG/DL — HIGH (ref 70–99)
GLUCOSE BLDC GLUCOMTR-MCNC: 163 MG/DL — HIGH (ref 70–99)
GLUCOSE BLDC GLUCOMTR-MCNC: 259 MG/DL — HIGH (ref 70–99)
GLUCOSE BLDC GLUCOMTR-MCNC: 270 MG/DL — HIGH (ref 70–99)
GLUCOSE SERPL-MCNC: 235 MG/DL — HIGH (ref 70–99)
HCT VFR BLD CALC: 28.3 % — LOW (ref 34.5–45)
HGB BLD-MCNC: 8.9 G/DL — LOW (ref 11.5–15.5)
MAGNESIUM SERPL-MCNC: 1.7 MG/DL — SIGNIFICANT CHANGE UP (ref 1.6–2.6)
MCHC RBC-ENTMCNC: 22.6 PG — LOW (ref 27–34)
MCHC RBC-ENTMCNC: 31.4 GM/DL — LOW (ref 32–36)
MCV RBC AUTO: 72 FL — LOW (ref 80–100)
PHOSPHATE SERPL-MCNC: 3 MG/DL — SIGNIFICANT CHANGE UP (ref 2.5–4.5)
PLATELET # BLD AUTO: 513 K/UL — HIGH (ref 150–400)
POTASSIUM SERPL-MCNC: 3.9 MMOL/L — SIGNIFICANT CHANGE UP (ref 3.5–5.3)
POTASSIUM SERPL-SCNC: 3.9 MMOL/L — SIGNIFICANT CHANGE UP (ref 3.5–5.3)
RBC # BLD: 3.93 M/UL — SIGNIFICANT CHANGE UP (ref 3.8–5.2)
RBC # FLD: 15.3 % — HIGH (ref 10.3–14.5)
SODIUM SERPL-SCNC: 133 MMOL/L — LOW (ref 135–145)
WBC # BLD: 7.31 K/UL — SIGNIFICANT CHANGE UP (ref 3.8–10.5)
WBC # FLD AUTO: 7.31 K/UL — SIGNIFICANT CHANGE UP (ref 3.8–10.5)

## 2018-08-07 PROCEDURE — 99232 SBSQ HOSP IP/OBS MODERATE 35: CPT

## 2018-08-07 PROCEDURE — 99233 SBSQ HOSP IP/OBS HIGH 50: CPT

## 2018-08-07 RX ORDER — ACETAMINOPHEN 500 MG
650 TABLET ORAL EVERY 6 HOURS
Qty: 0 | Refills: 0 | Status: DISCONTINUED | OUTPATIENT
Start: 2018-08-07 | End: 2018-08-08

## 2018-08-07 RX ORDER — ONDANSETRON 8 MG/1
4 TABLET, FILM COATED ORAL
Qty: 0 | Refills: 0 | Status: DISCONTINUED | OUTPATIENT
Start: 2018-08-07 | End: 2018-08-08

## 2018-08-07 RX ADMIN — ENOXAPARIN SODIUM 40 MILLIGRAM(S): 100 INJECTION SUBCUTANEOUS at 12:00

## 2018-08-07 RX ADMIN — Medication 3: at 13:10

## 2018-08-07 RX ADMIN — CEFTRIAXONE 100 GRAM(S): 500 INJECTION, POWDER, FOR SOLUTION INTRAMUSCULAR; INTRAVENOUS at 17:26

## 2018-08-07 RX ADMIN — TIOTROPIUM BROMIDE 1 CAPSULE(S): 18 CAPSULE ORAL; RESPIRATORY (INHALATION) at 12:01

## 2018-08-07 RX ADMIN — MORPHINE SULFATE 2 MILLIGRAM(S): 50 CAPSULE, EXTENDED RELEASE ORAL at 06:01

## 2018-08-07 RX ADMIN — MORPHINE SULFATE 2 MILLIGRAM(S): 50 CAPSULE, EXTENDED RELEASE ORAL at 05:31

## 2018-08-07 RX ADMIN — Medication 3 MILLILITER(S): at 23:37

## 2018-08-07 RX ADMIN — BUDESONIDE AND FORMOTEROL FUMARATE DIHYDRATE 2 PUFF(S): 160; 4.5 AEROSOL RESPIRATORY (INHALATION) at 05:08

## 2018-08-07 RX ADMIN — Medication 20 MILLIGRAM(S): at 05:08

## 2018-08-07 RX ADMIN — MORPHINE SULFATE 2 MILLIGRAM(S): 50 CAPSULE, EXTENDED RELEASE ORAL at 11:26

## 2018-08-07 RX ADMIN — CHLORHEXIDINE GLUCONATE 1 APPLICATION(S): 213 SOLUTION TOPICAL at 08:48

## 2018-08-07 RX ADMIN — Medication 3 MILLILITER(S): at 05:08

## 2018-08-07 RX ADMIN — Medication 3: at 10:26

## 2018-08-07 RX ADMIN — ONDANSETRON 4 MILLIGRAM(S): 8 TABLET, FILM COATED ORAL at 17:18

## 2018-08-07 RX ADMIN — PANTOPRAZOLE SODIUM 40 MILLIGRAM(S): 20 TABLET, DELAYED RELEASE ORAL at 12:01

## 2018-08-07 RX ADMIN — Medication 3 MILLILITER(S): at 12:00

## 2018-08-07 RX ADMIN — Medication 1: at 17:32

## 2018-08-07 RX ADMIN — DEXTROSE MONOHYDRATE, SODIUM CHLORIDE, AND POTASSIUM CHLORIDE 110 MILLILITER(S): 50; .745; 4.5 INJECTION, SOLUTION INTRAVENOUS at 03:25

## 2018-08-07 RX ADMIN — Medication 3 MILLILITER(S): at 17:22

## 2018-08-07 RX ADMIN — MORPHINE SULFATE 2 MILLIGRAM(S): 50 CAPSULE, EXTENDED RELEASE ORAL at 10:56

## 2018-08-07 RX ADMIN — BUDESONIDE AND FORMOTEROL FUMARATE DIHYDRATE 2 PUFF(S): 160; 4.5 AEROSOL RESPIRATORY (INHALATION) at 17:22

## 2018-08-07 RX ADMIN — Medication 0.25 MILLIGRAM(S): at 12:00

## 2018-08-07 NOTE — PROGRESS NOTE ADULT - SUBJECTIVE AND OBJECTIVE BOX
GENERAL SURGERY DAILY PROGRESS NOTE:     Subjective: Patient seen and examined. Patient stable with no overnight events. Patient denies CP/ SOB/ Palpitations.  Patient denies N/V. Reports output noted in the ostomy bag. Patient reports feeling better than she has ever felt.      MEDICATIONS  (STANDING):  ALBUTerol/ipratropium for Nebulization 3 milliLiter(s) Nebulizer every 6 hours  buDESOnide 160 MICROgram(s)/formoterol 4.5 MICROgram(s) Inhaler 2 Puff(s) Inhalation two times a day  cefTRIAXone   IVPB 1 Gram(s) IV Intermittent every 24 hours  chlorhexidine 4% Liquid 1 Application(s) Topical <User Schedule>  dextrose 5%. 1000 milliLiter(s) (50 mL/Hr) IV Continuous <Continuous>  dextrose 50% Injectable 12.5 Gram(s) IV Push once  dextrose 50% Injectable 25 Gram(s) IV Push once  dextrose 50% Injectable 25 Gram(s) IV Push once  digoxin  Injectable 0.25 milliGRAM(s) IV Push daily  enoxaparin Injectable 40 milliGRAM(s) SubCutaneous daily  hydrocortisone sodium succinate Injectable 20 milliGRAM(s) IV Push daily  insulin lispro (HumaLOG) corrective regimen sliding scale   SubCutaneous three times a day before meals  insulin lispro (HumaLOG) corrective regimen sliding scale   SubCutaneous at bedtime  ondansetron Injectable 4 milliGRAM(s) IV Push once  pantoprazole  Injectable 40 milliGRAM(s) IV Push daily  tiotropium 18 MICROgram(s) Capsule 1 Capsule(s) Inhalation daily    MEDICATIONS  (PRN):  ALBUTerol    90 MICROgram(s) HFA Inhaler 2 Puff(s) Inhalation every 6 hours PRN Shortness of Breath  dextrose 40% Gel 15 Gram(s) Oral once PRN Blood Glucose LESS THAN 70 milliGRAM(s)/deciliter  diphenhydrAMINE   Injectable 12.5 milliGRAM(s) IV Push every 4 hours PRN Itching  glucagon  Injectable 1 milliGRAM(s) IntraMuscular once PRN Glucose LESS THAN 70 milligrams/deciliter  metoclopramide Injectable 10 milliGRAM(s) IV Push every 6 hours PRN Nausea  morphine  - Injectable 2 milliGRAM(s) IV Push every 4 hours PRN Moderate Pain (4 - 6)  tetracaine/benzocaine/butamben Spray 1 Spray(s) Topical four times a day PRN NGT irritation      Vital Signs Last 24 Hrs  T(C): 37.3 (07 Aug 2018 06:18), Max: 37.4 (07 Aug 2018 01:50)  T(F): 99.1 (07 Aug 2018 06:18), Max: 99.3 (07 Aug 2018 01:50)  HR: 85 (07 Aug 2018 06:18) (72 - 85)  BP: 127/71 (07 Aug 2018 06:18) (110/70 - 147/68)  RR: 18 (07 Aug 2018 06:18) (18 - 18)  SpO2: 94% (07 Aug 2018 06:18) (93% - 95%)    I&O's Detail:   06 Aug 2018 07:01  -  07 Aug 2018 07:00  --------------------------------------------------------  IN:    dextrose 5% + sodium chloride 0.45% with potassium chloride 20 mEq/L: 1990 mL    Oral Fluid: 600 mL    Solution: 250 mL  Total IN: 2840 mL  OUT:    Colostomy: 450 mL    Voided: 1450 mL  Total OUT: 1900 mL  Total NET: 940 mL      LABS:                        9.2    7.65  )-----------( 472      ( 06 Aug 2018 09:14 )             28.4     08-07    133<L>  |  98  |  <4<L>  ----------------------------<  235<H>  3.9   |  24  |  0.60    Ca    7.9<L>      07 Aug 2018 07:20  Phos  3.0     08-07  Mg     1.7     08-07      Physical Exam:   Gen: NAD, AAOx3  Abdomen: soft, NT, ND   Incision: clean/dry/intact     Assessment:    67y Female who presents with Malignant neoplasm of anal canal    Plan:  -DVT PPX  -Pain control   -OOB as tolerated with assistance   -Diet as tolerated  -Await Gi Fxn   -Dispo Planning     Marry Valadez   #[id number] 08-07-18 @ 08:41

## 2018-08-07 NOTE — PROGRESS NOTE ADULT - SUBJECTIVE AND OBJECTIVE BOX
Consult:  · Requested by Name:	Terrell Arango	  · Date/Time:	08-Aug-2018 	  · Reason for Referral/Consultation:	Perioperative management of cardiac conditions	      · Subjective and Objective: 	  **********              CARDIOLOGY CONSULT PROGRESS NOTE              ************  ============================================================  CHIEF COMPLAINT/REASON FOR CONSULT:  Patient is a 67y old  Female who presents with a chief complaint of rectal bleeding (2018 10:00)      HISTORY OF PRESENT ILLNESS:  67yFemale with a history of Remote Asthma, DVT, TIA, COPD, Moderate AI, Normal LVEF, pAF on Digoxin/Eliquis prior to admission, Colorectal CA p/w bleeding s/p SCC resection  - presenting for SCC resection.  No CP/SOB/Palps.         ============================================================  24 hr events  - BP controlled; HR controlled  - Continues on IV Dig  - Tolerating PO  - No reported worsening CP/Palps/SOB   ============================================================      Allergies    ampicillin (Short breath)  aspirin (Short breath)  Avelox (Short breath; Pruritus)  codeine (Short breath)  Dilaudid (Short breath)  iodine (Short breath; Swelling)  penicillin (Short breath)  shellfish (Anaphylaxis)  tetanus toxoid (Short breath)  Valium (Short breath)    Intolerances    	    MEDICATIONS:  heparin  Injectable 5000 Unit(s) SubCutaneous every 8 hours    aztreonam  IVPB 2000 milliGRAM(s) IV Intermittent once  clindamycin IVPB 900 milliGRAM(s) IV Intermittent once    ALBUTerol    90 MICROgram(s) HFA Inhaler 2 Puff(s) Inhalation every 6 hours PRN  ALBUTerol/ipratropium for Nebulization 3 milliLiter(s) Nebulizer every 6 hours  diphenhydrAMINE   Injectable 12.5 milliGRAM(s) IV Push every 4 hours PRN  tiotropium 18 MICROgram(s) Capsule 1 Capsule(s) Inhalation daily    morphine PCA (5 mG/mL) 30 milliLiter(s) PCA Continuous PCA Continuous  morphine PCA (5 mG/mL) Rescue Clinician Bolus 2.5 milliGRAM(s) IV Push every 15 minutes PRN    pantoprazole  Injectable 40 milliGRAM(s) IV Push daily    dextrose 40% Gel 15 Gram(s) Oral once PRN  dextrose 50% Injectable 12.5 Gram(s) IV Push once  dextrose 50% Injectable 25 Gram(s) IV Push once  dextrose 50% Injectable 25 Gram(s) IV Push once  glucagon  Injectable 1 milliGRAM(s) IntraMuscular once PRN  insulin lispro (HumaLOG) corrective regimen sliding scale   SubCutaneous every 6 hours    dextrose 5% + sodium chloride 0.45%. 1000 milliLiter(s) IV Continuous <Continuous>  dextrose 5%. 1000 milliLiter(s) IV Continuous <Continuous>      PAST MEDICAL & SURGICAL HISTORY:  TIA (transient ischemic attack): multiple, last 5 years ago - presents as right-sided weakness  DVT (deep venous thrombosis): 15-20 years ago, took coumadin  Seizure: x 1 18  Rectal bleeding  Colorectal cancer: 2018- last treatment , chemo and radiation  Tracheobronchomalacia: diagnosed , s/p bronchial thermoplasty  (Dr Zapien); recent bronchoscopy 2018 revealed no evidence of tracheobronchomalacia in trachea or bronchial tubes  Asthma  Pelvic fracture  Aortic insufficiency: moderate AR on echo 5/3/2018  Adrenal insufficiency: Medrol daily for over 50 years  COPD (chronic obstructive pulmonary disease)  Diabetes: Type 2  Atrial fibrillation: paroxysmal, on eliquis  Rectal bleeding: exam under anesthesia ([facility]) 2018  S/P bronchoscopy: 2018 - [facility] (Dr Zapien) no evidence of tracheobronchomalacia in trachea or bronchial tubes  History of tracheomalacia:  - attempted tracheal stenting ([facility])- course complicated by obstruction, respiratory failure, multiple CPR attempts -  stent discontinued; 10/20/2016 Tracheobronchoplasty (Prolene Mesh) performed at [facility] by Dr Zapien  H/O pelvic surgery: 5 years ago - s/p fracture  Exostosis of orbit, 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  History of sinus surgery: multiple sinus surgeries  H/O total knee replacement, bilateral: 5 years ago  History of partial hysterectomy: 30 years ago - fibroids      FAMILY HISTORY:  Family history of diabetes mellitus type II  Family history of breast cancer (Sibling)  Family history of asthma    NC -   Mother - HTN  Father - DM    SOCIAL HISTORY:    [ x] Non-smoker  [x] No Illicit Drug Use  [ x] No Excess EtOH Use      REVIEW OF SYSTEMS: (Unless + Before Symptom, it is negative)  Constitutional: No Fever, +Fatigue, Weight Changes  Eyes: No Recent Vision Changes, Eye Pain  ENT: No Congestion, Sore Throat  Endocrine: No Excess Sweating, Temperature Intolerance  Cardiovascular: No Chest Pain, Palpitations, Shortness of Breath, Pre-syncope, Syncope, LE Edema  Respiratory: No Cough, Congestion, Wheezing  Gastrointestinal: +Abdominal Pain, Nausea, Vomiting  Genitourinary: No dysuria, hematuria  Musculoskeletal: No Joint Pain, Swelling  Neurologic: No headaches, Imbalance, Weakness  Skin: No rashes, hematoma, purprura    ================================    PHYSICAL EXAM:  Vital Signs Last 24 Hrs  T(C): 36.8 (01 Aug 2018 16:33), Max: 39.5 (2018 21:00)  T(F): 98.2 (01 Aug 2018 16:33), Max: 103.1 (2018 21:00)  HR: 70 (01 Aug 2018 16:33) (70 - 101)  BP: 117/64 (01 Aug 2018 16:33) (95/62 - 143/83)  BP(mean): --  RR: 18 (01 Aug 2018 16:33) (10 - 18)  SpO2: 96% (01 Aug 2018 16:33) (94% - 98%)    Appearance: Normal; NAD	  HEENT:   Normal oral mucosa, EOMI	  Lymphatic: No lymphadenopathy  Cardiovascular: Normal S1 S2, No JVD, No murmurs, No edema  Respiratory: Lungs clear to auscultation, no use of accessory muscles	  Psychiatry: A & O x 3, Mood & affect appropriate  Gastrointestinal:  Soft, +Distended +Less Tender +Ostomy  Skin: No rashes, No ecchymoses, No cyanosis	  Neurologic: Non-focal, No Focal Deficits  Extremities: Normal range of motion, No clubbing, cyanosis or edema  Vascular: Peripheral pulses palpable 2+ bilaterally, no prominent varicosities    ============================    LABS:	   Labs [id number]    CBC Full  -  ( 2018 04:32 )  WBC Count : 14.8 K/uL  Hemoglobin : 11.4 g/dL  Hematocrit : 37.5 %  Platelet Count - Automated : 201 K/uL  Mean Cell Volume : 76.1 fl  Mean Cell Hemoglobin : 23.1 pg  Mean Cell Hemoglobin Concentration : 30.3 gm/dL  Auto Neutrophil # : x  Auto Lymphocyte # : x  Auto Monocyte # : x  Auto Eosinophil # : x  Auto Basophil # : x  Auto Neutrophil % : x  Auto Lymphocyte % : x  Auto Monocyte % : x  Auto Eosinophil % : x  Auto Basophil % : x    07    142  |  106  |  10  ----------------------------<  191<H>  4.6   |  26  |  0.77  07-25    140  |  104  |  9   ----------------------------<  201<H>  4.4   |  25  |  0.73    Ca    8.2<L>      2018 04:32  Ca    8.2<L>      2018 00:16  Phos  4.2     -25  Phos  4.1     -25  Mg     2.1     -25  Mg     2.0     25          =========================================================================  CXR:  < from: Xray Chest 1 View- PORTABLE-Urgent (18 @ 16:02) >    EXAM:  XR CHEST PORTABLE URGENT 1V                          PROCEDURE DATE:  2018          INTERPRETATION:  Portable Chest X-Ray dated 2018 4:02 PM    Indication: s/p bronch    Prior studies: 2018    An AP portable view of the chest revealed cardiomegaly. Trachea midline.   No pneumothorax seen. Trace right pleural effusion. Increased markings in   the left lower lobe. Right Port-A-Cath with the tip in the right atrium.    IMPRESSION:  No pneumothorax seen. Trace right pleural effusion.             "Thank you for the opportunity to participate in the care of this   patient."    < end of copied text >      ECG:  	    PREVIOUS DIAGNOSTIC TESTING:    [X] Echocardiogram:  < from: Echocardiogram (18 @ 10:55) >      INTERPRETATION:  Patient Height: 170.2 cm  Patient Weight: 73.0 kg  Heart Rate: 70 bpm  Systolic Pressure: 132 mmHg  Diastolic Pressure: 70 mmHg  BSA: 1.8 m^2  Interpretation Summary  The left atrial size is normal. Right atrial size is normal.There is mild   aortic valve thickening. The aortic valve is trileaflet. There is   moderate   aortic regurgitation. No hemodynamically significantvalvular aortic   stenosis.    There is mild mitral valve thickening. There is mild mitral annular   calcification. There is trace mitral regurgitation.  Structurally normal   tricuspid valve. There is trace tricuspid regurgitation.There was   insufficient   TR detected from which to calculate pulmonary artery systolic pressure.    The   pulmonic valve is not well visualized. No pulmonic regurgitation   noted.The   right ventricle is normal in size and function.There is moderate   concentric   left ventricular hypertrophy. The left ventricular wall motion is   normal. The   left ventricular ejection fraction is 63%.  No aortic root   dilatation.There is   no pericardial effusion.When compared to prior study performed on   10/21/16,   there isno significant change.  Procedure Details  A complete two-dimensional transthoracic echocardiogram was performed (2D,  M-mode, spectral and color flow doppler).  Study Quality: Fair.  Left Ventricle  There is moderate concentric left ventricular hypertrophy.  The left ventricular wall motion is normal.  The left ventricular ejection fraction is 63%.  Left Atrium  The left atrial size is normal.  Right Atrium  Right atrial size is normal.  Right Ventricle  The right ventricle is normal in size andfunction.  Aortic Valve  There is mild aortic valve thickening.  The aortic valve is trileaflet.  There is moderate aortic regurgitation.  No hemodynamically significant valvular aortic stenosis.  Mitral Valve  There is mild mitral valve thickening.  There is mild mitral annular calcification.  There is trace mitral regurgitation.  Tricuspid Valve  Structurally normal tricuspid valve.  There was insufficient TR detected from which to calculate pulmonary   artery  systolic pressure.  There is trace tricuspid regurgitation.  Pulmonic Valve  The pulmonic valve is not well visualized.  No pulmonic regurgitation noted.  Arteries and Venous System  No aortic root dilatation.  The inferior vena cava is normal in size (<2.1 cm) with normal inspiratory  collapse (>50%) consistent with normal right atrial pressure.  Pericardium / Pleura  There is no pericardial effusion.  Doppler Measurements & Calculations  MV E point: 67.9 cm/sec  MV A point: 91.3 cm/sec  MV E/A: 0.7  MV dec time: 0.2 sec  Ao V2 max: 172.4 cm/sec  Ao max P.9 mmHg  Ao max PG (full): 5.5 mmHg  SUSANA(V,A): 2.4 cm^2  SUSANA(V,D): 2.4 cm^2  AI max lynette: 399.1 cm/sec  AI max P.7 mmHg  AI dec slope: 275.9 cm/sec^2  AI P1/2t: 423.7 msec  LV max P.4 mmHg  LV V1 max: 126.5 cm/sec  MMode 2D Measurements & Calculations  IVSd: 1.3 cm  LVIDd: 4.2 cm  LVIDs: 2.9 cm  LVPWd: 1.1 cm  IVS/LVPW: 1.2  FS: 31.3 %  EDV(Teich): 80.1 ml  ESV(Teich): 32.5 ml  LV mass(C)d: 180.5 grams  LV mass(C)dI: 97.9 grams/m^2  SI(cubed): 27.8 ml/m^2  Ao root diam: 3.3 cm  Ao root area: 8.8 cm^2  LA dimension: 4.6 cm  LA/Ao: 1.4  LVOT diam: 2.0 cm  LVOT area: 3.2 cm^2  LVOT area (M): 3.8 cm^2  LVLd ap4: 7.6 cm  EDV(MOD-sp4): 70 ml  LVLs ap4: 6.6 cm  ESV(MOD-sp4): 22 ml  EF(MOD-sp4): 68.6 %  LVLd ap2: 7.4 cm  EDV(MOD-sp2): 43 ml  LVLs ap2: 5.9 cm  ESV(MOD-sp2): 16 ml  EF(MOD-sp2): 62.8 %  SV(MOD-sp4): 48 ml  SI(MOD-sp4): 26.0 ml/m^2  SV(MOD-sp2): 27 ml  SI(MOD-sp2): 14.6 ml/m^2  Interpreting Physician:OMAR Dias electronically signed on   2018 12:45:03        =========================================================================  ASSESSMENT:    67yFemale with a history of Remote Asthma, DVT, TIA, COPD, Moderate AI, Normal LVEF, pAF on Digoxin/Eliquis prior to admission, Colorectal CA p/w bleeding s/p SCC resection POD#1  - presenting for SCC resection.    Recs  - Restart Apixaban unless contraindication  - Continue Digoxin -> Switch to PO  - Hold Diovan  - Careful with fluid load given AI  - Thank you for this consult.  - Will Follow      Please call with questions.     Baron Tristan MD, [facility]  [phone]

## 2018-08-07 NOTE — PROGRESS NOTE ADULT - ATTENDING COMMENTS
as above-Stable at present--off abx--+ ostomy function-- temps--(controlled) -Likely urine based on UA (sputum c/w pseudomonas Aereginosa---sensitivities pending--would hold on rx; UTI--ceftraixone initiated 8/3  CRS- NGT removed---? PO initiation as per CRS  Pulm meds-symbicort/spiriva, duoneb, singulair                  Adrenal insufficiency-medrol 4mg  ambulate--as per CRS--pain control and motility agents.   Rehab pending return of full GI fxn.    Eulalio Allison MD-Pulmonary   [phone] as above-Stable at present--off abx--+ ostomy function-- temps--(controlled) -Likely urine based on UA (sputum c/w pseudomonas Aereginosa---sensitivities pending--would hold on rx; UTI--ceftraixone initiated 8/3--? duration  CRS- NGT removed---+PO initiation as per CRS--clears OK'd  Pulm meds-symbicort/spiriva, duoneb, singulair                  Adrenal insufficiency-medrol 4mg  ambulate--as per CRS--pain control and motility agents.   Rehab pending return of full GI fxn.    Eulalio Allison MD-Pulmonary   [phone]

## 2018-08-07 NOTE — PROGRESS NOTE ADULT - ASSESSMENT
67 yr F with PMH of COPD/asthma, tracheobronchomalacia s/p tracheo-bronchoplasty in 10/16, Afib on Eliquis , Adrenal Insufficieny on steroids,  DM2, HTN, Squamous cell CA of the anus on chemo/XRT, now s/p elective abdominoperineal proctectomy for recurrent exophytic anal cancer on 7/24/18    1. Cough:  - Currently stable pulm status  -  Sputum Cx--pseudomonas aereginosa    -  Off Antibiotic (GI) regimen of Aztreonam and Clindamycin as of 7/27 ---now on abx for UTI (ceftriaxone 8/3)  - On nebulized hypertonic saline Q12H (preceded by Duonebs) to assist with mobilization of secretions  - Cont close monitoring of resp symptoms. Optimize pain to prevent resp splinting  - Incentive spirometry/ acapella use/ OBC and continue mobilization    2. COPD/ Asthma/ Tracheomalacia s/p tracheobronchoplasty- on Duonebs Q6H  - continue Symbicort BID (takes Breo ellipta at home which is non formularly)  - continue Spiriva daily  - Once tolerating PO intake, can restart home dose of Singulair daily  - Supplemental O2 to keep sats > 90 %    SEE below as well--NGT out 7/31; 8/1-fever--cxr--c/w prior films-sputum c/w pseudomonas Aereginosa  8/3-ceftriaxone for UTI

## 2018-08-07 NOTE — PROGRESS NOTE ADULT - SUBJECTIVE AND OBJECTIVE BOX
CHIEF COMPLAINT: f/up for TBM, asthma, allergy--    Interval Events:    REVIEW OF SYSTEMS:  Constitutional: No fevers or chills. No weight loss. No fatigue or generalized malaise.  Eyes: No itching or discharge from the eyes  ENT: No ear pain. No ear discharge. No nasal congestion. No post nasal drip. No epistaxis. No throat pain. No sore throat. No difficulty swallowing.   CV: No chest pain. No palpitations. No lightheadedness or dizziness.   Resp: No dyspnea at rest. No dyspnea on exertion. No orthopnea. No wheezing. No cough. No stridor. No sputum production. No chest pain with respiration.  GI: No nausea. No vomiting. No diarrhea.  MSK: No joint pain or pain in any extremities  Integumentary: No skin lesions. No pedal edema.  Neurological: No gross motor weakness. No sensory changes.  [ ] All other systems negative  [ ] Unable to assess ROS because ________    OBJECTIVE:  ICU Vital Signs Last 24 Hrs  T(C): 37.4 (07 Aug 2018 01:50), Max: 37.4 (07 Aug 2018 01:50)  T(F): 99.3 (07 Aug 2018 01:50), Max: 99.3 (07 Aug 2018 01:50)  HR: 80 (07 Aug 2018 01:50) (72 - 81)  BP: 124/66 (07 Aug 2018 01:50) (110/70 - 147/68)  BP(mean): --  ABP: --  ABP(mean): --  RR: 18 (07 Aug 2018 01:50) (18 - 18)  SpO2: 94% (07 Aug 2018 01:50) (93% - 95%)        08-05 @ 07:01  -  08-06 @ 07:00  --------------------------------------------------------  IN: 3310 mL / OUT: 2125 mL / NET: 1185 mL    08-06 @ 07:01 - 08-07 @ 05:02  --------------------------------------------------------  IN: 2840 mL / OUT: 1450 mL / NET: 1390 mL      CAPILLARY BLOOD GLUCOSE      POCT Blood Glucose.: 143 mg/dL (06 Aug 2018 21:43)      PHYSICAL EXAM:  General: Awake, alert, oriented X 3.   HEENT: Atraumatic, normocephalic.                 Mallampatti Grade                 No nasal congestion.                No tonsillar or pharyngeal exudates.  Lymph Nodes: No palpable lymphadenopathy  Neck: No JVD. No carotid bruit.   Respiratory: Normal chest expansion                         Normal percussion                         Normal and equal air entry                         No wheeze, rhonchi or rales.  Cardiovascular: S1 S2 normal. No murmurs, rubs or gallops.   Abdomen: Soft, non-tender, non-distended. No organomegaly.  Extremities: Warm to touch. Peripheral pulse palpable. No pedal edema.   Skin: No rashes or skin lesions  Neurological: Motor and sensory examination equal and normal in all four extremities.  Psychiatry: Appropriate mood and affect.    HOSPITAL MEDICATIONS:  MEDICATIONS  (STANDING):  ALBUTerol/ipratropium for Nebulization 3 milliLiter(s) Nebulizer every 6 hours  buDESOnide 160 MICROgram(s)/formoterol 4.5 MICROgram(s) Inhaler 2 Puff(s) Inhalation two times a day  cefTRIAXone   IVPB 1 Gram(s) IV Intermittent every 24 hours  chlorhexidine 4% Liquid 1 Application(s) Topical <User Schedule>  dextrose 5% + sodium chloride 0.45% with potassium chloride 20 mEq/L 1000 milliLiter(s) (110 mL/Hr) IV Continuous <Continuous>  dextrose 5%. 1000 milliLiter(s) (50 mL/Hr) IV Continuous <Continuous>  dextrose 50% Injectable 12.5 Gram(s) IV Push once  dextrose 50% Injectable 25 Gram(s) IV Push once  dextrose 50% Injectable 25 Gram(s) IV Push once  digoxin  Injectable 0.25 milliGRAM(s) IV Push daily  enoxaparin Injectable 40 milliGRAM(s) SubCutaneous daily  hydrocortisone sodium succinate Injectable 20 milliGRAM(s) IV Push daily  insulin lispro (HumaLOG) corrective regimen sliding scale   SubCutaneous three times a day before meals  insulin lispro (HumaLOG) corrective regimen sliding scale   SubCutaneous at bedtime  ondansetron Injectable 4 milliGRAM(s) IV Push once  pantoprazole  Injectable 40 milliGRAM(s) IV Push daily  tiotropium 18 MICROgram(s) Capsule 1 Capsule(s) Inhalation daily    MEDICATIONS  (PRN):  ALBUTerol    90 MICROgram(s) HFA Inhaler 2 Puff(s) Inhalation every 6 hours PRN Shortness of Breath  dextrose 40% Gel 15 Gram(s) Oral once PRN Blood Glucose LESS THAN 70 milliGRAM(s)/deciliter  diphenhydrAMINE   Injectable 12.5 milliGRAM(s) IV Push every 4 hours PRN Itching  glucagon  Injectable 1 milliGRAM(s) IntraMuscular once PRN Glucose LESS THAN 70 milligrams/deciliter  metoclopramide Injectable 10 milliGRAM(s) IV Push every 6 hours PRN Nausea  morphine  - Injectable 2 milliGRAM(s) IV Push every 4 hours PRN Moderate Pain (4 - 6)  tetracaine/benzocaine/butamben Spray 1 Spray(s) Topical four times a day PRN NGT irritation      LABS:                        9.2    7.65  )-----------( 472      ( 06 Aug 2018 09:14 )             28.4     08-06    136  |  100  |  <4<L>  ----------------------------<  211<H>  4.3   |  26  |  0.60    Ca    8.3<L>      06 Aug 2018 07:09  Phos  2.4     08-06  Mg     1.8     08-06                MICROBIOLOGY:     RADIOLOGY:  [ ] Reviewed and interpreted by me    Point of Care Ultrasound Findings:    PFT:    EKG: CHIEF COMPLAINT: f/up for TBM, asthma, allergy--much better--rare cough w/ production; buttocks pain    Interval Events: ambulating not much    REVIEW OF SYSTEMS:  Constitutional: No fevers or chills. No weight loss. + fatigue or generalized malaise.  Eyes: No itching or discharge from the eyes  ENT: No ear pain. No ear discharge. No nasal congestion. No post nasal drip. No epistaxis. No throat pain. No sore throat. No difficulty swallowing.   CV: No chest pain. No palpitations. No lightheadedness or dizziness.   Resp: No dyspnea at rest. + dyspnea on exertion. No orthopnea. No wheezing. No cough. No stridor. + sputum production. No chest pain with respiration.  GI: No nausea. No vomiting. No diarrhea.  MSK: No joint pain or pain in any extremities  Integumentary: No skin lesions. No pedal edema.  Neurological: No gross motor weakness. No sensory changes.  [+ ] All other systems negative  [ ] Unable to assess ROS because ________    OBJECTIVE:  ICU Vital Signs Last 24 Hrs  T(C): 37.4 (07 Aug 2018 01:50), Max: 37.4 (07 Aug 2018 01:50)  T(F): 99.3 (07 Aug 2018 01:50), Max: 99.3 (07 Aug 2018 01:50)  HR: 80 (07 Aug 2018 01:50) (72 - 81)  BP: 124/66 (07 Aug 2018 01:50) (110/70 - 147/68)  BP(mean): --  ABP: --  ABP(mean): --  RR: 18 (07 Aug 2018 01:50) (18 - 18)  SpO2: 94% (07 Aug 2018 01:50) (93% - 95%)        08-05 @ 07:01  -  08-06 @ 07:00  --------------------------------------------------------  IN: 3310 mL / OUT: 2125 mL / NET: 1185 mL    08-06 @ 07:01  -  08-07 @ 05:02  --------------------------------------------------------  IN: 2840 mL / OUT: 1450 mL / NET: 1390 mL      CAPILLARY BLOOD GLUCOSE      POCT Blood Glucose.: 143 mg/dL (06 Aug 2018 21:43)      PHYSICAL EXAM: NAD in bed on RA  General: Awake, alert, oriented X 3.   HEENT: Atraumatic, normocephalic.                 Mallampatti Grade 2                No nasal congestion.                No tonsillar or pharyngeal exudates.  Lymph Nodes: No palpable lymphadenopathy  Neck: No JVD. No carotid bruit.   Respiratory: Normal chest expansion                         Normal percussion                         Normal and equal air entry                         No wheeze, rhonchi or rales.  Cardiovascular: S1 S2 normal. No murmurs, rubs or gallops.   Abdomen: Soft, non-tender, non-distended. No organomegaly. NABS  Extremities: Warm to touch. Peripheral pulse palpable. No pedal edema.   Skin: No rashes or skin lesions  Neurological: Motor and sensory examination equal and normal in all four extremities.  Psychiatry: Appropriate mood and affect.    HOSPITAL MEDICATIONS:  MEDICATIONS  (STANDING):  ALBUTerol/ipratropium for Nebulization 3 milliLiter(s) Nebulizer every 6 hours  buDESOnide 160 MICROgram(s)/formoterol 4.5 MICROgram(s) Inhaler 2 Puff(s) Inhalation two times a day  cefTRIAXone   IVPB 1 Gram(s) IV Intermittent every 24 hours  chlorhexidine 4% Liquid 1 Application(s) Topical <User Schedule>  dextrose 5% + sodium chloride 0.45% with potassium chloride 20 mEq/L 1000 milliLiter(s) (110 mL/Hr) IV Continuous <Continuous>  dextrose 5%. 1000 milliLiter(s) (50 mL/Hr) IV Continuous <Continuous>  dextrose 50% Injectable 12.5 Gram(s) IV Push once  dextrose 50% Injectable 25 Gram(s) IV Push once  dextrose 50% Injectable 25 Gram(s) IV Push once  digoxin  Injectable 0.25 milliGRAM(s) IV Push daily  enoxaparin Injectable 40 milliGRAM(s) SubCutaneous daily  hydrocortisone sodium succinate Injectable 20 milliGRAM(s) IV Push daily  insulin lispro (HumaLOG) corrective regimen sliding scale   SubCutaneous three times a day before meals  insulin lispro (HumaLOG) corrective regimen sliding scale   SubCutaneous at bedtime  ondansetron Injectable 4 milliGRAM(s) IV Push once  pantoprazole  Injectable 40 milliGRAM(s) IV Push daily  tiotropium 18 MICROgram(s) Capsule 1 Capsule(s) Inhalation daily    MEDICATIONS  (PRN):  ALBUTerol    90 MICROgram(s) HFA Inhaler 2 Puff(s) Inhalation every 6 hours PRN Shortness of Breath  dextrose 40% Gel 15 Gram(s) Oral once PRN Blood Glucose LESS THAN 70 milliGRAM(s)/deciliter  diphenhydrAMINE   Injectable 12.5 milliGRAM(s) IV Push every 4 hours PRN Itching  glucagon  Injectable 1 milliGRAM(s) IntraMuscular once PRN Glucose LESS THAN 70 milligrams/deciliter  metoclopramide Injectable 10 milliGRAM(s) IV Push every 6 hours PRN Nausea  morphine  - Injectable 2 milliGRAM(s) IV Push every 4 hours PRN Moderate Pain (4 - 6)  tetracaine/benzocaine/butamben Spray 1 Spray(s) Topical four times a day PRN NGT irritation      LABS:                        9.2    7.65  )-----------( 472      ( 06 Aug 2018 09:14 )             28.4     08-06    136  |  100  |  <4<L>  ----------------------------<  211<H>  4.3   |  26  |  0.60    Ca    8.3<L>      06 Aug 2018 07:09  Phos  2.4     08-06  Mg     1.8     08-06                MICROBIOLOGY:     RADIOLOGY:  [ ] Reviewed and interpreted by me    Point of Care Ultrasound Findings:    PFT:    EKG:

## 2018-08-08 VITALS
OXYGEN SATURATION: 95 % | SYSTOLIC BLOOD PRESSURE: 117 MMHG | HEART RATE: 77 BPM | RESPIRATION RATE: 18 BRPM | DIASTOLIC BLOOD PRESSURE: 65 MMHG | TEMPERATURE: 99 F

## 2018-08-08 LAB
ANION GAP SERPL CALC-SCNC: 9 MMOL/L — SIGNIFICANT CHANGE UP (ref 5–17)
BUN SERPL-MCNC: <4 MG/DL — LOW (ref 7–23)
CALCIUM SERPL-MCNC: 8.1 MG/DL — LOW (ref 8.4–10.5)
CHLORIDE SERPL-SCNC: 100 MMOL/L — SIGNIFICANT CHANGE UP (ref 96–108)
CO2 SERPL-SCNC: 28 MMOL/L — SIGNIFICANT CHANGE UP (ref 22–31)
CREAT SERPL-MCNC: 0.6 MG/DL — SIGNIFICANT CHANGE UP (ref 0.5–1.3)
CULTURE RESULTS: SIGNIFICANT CHANGE UP
GLUCOSE BLDC GLUCOMTR-MCNC: 160 MG/DL — HIGH (ref 70–99)
GLUCOSE BLDC GLUCOMTR-MCNC: 197 MG/DL — HIGH (ref 70–99)
GLUCOSE BLDC GLUCOMTR-MCNC: 252 MG/DL — HIGH (ref 70–99)
GLUCOSE SERPL-MCNC: 178 MG/DL — HIGH (ref 70–99)
HCT VFR BLD CALC: 27.9 % — LOW (ref 34.5–45)
HGB BLD-MCNC: 8.8 G/DL — LOW (ref 11.5–15.5)
MAGNESIUM SERPL-MCNC: 1.7 MG/DL — SIGNIFICANT CHANGE UP (ref 1.6–2.6)
MCHC RBC-ENTMCNC: 22.7 PG — LOW (ref 27–34)
MCHC RBC-ENTMCNC: 31.5 GM/DL — LOW (ref 32–36)
MCV RBC AUTO: 71.9 FL — LOW (ref 80–100)
PHOSPHATE SERPL-MCNC: 3 MG/DL — SIGNIFICANT CHANGE UP (ref 2.5–4.5)
PLATELET # BLD AUTO: 482 K/UL — HIGH (ref 150–400)
POTASSIUM SERPL-MCNC: 3.6 MMOL/L — SIGNIFICANT CHANGE UP (ref 3.5–5.3)
POTASSIUM SERPL-SCNC: 3.6 MMOL/L — SIGNIFICANT CHANGE UP (ref 3.5–5.3)
RBC # BLD: 3.88 M/UL — SIGNIFICANT CHANGE UP (ref 3.8–5.2)
RBC # FLD: 15.2 % — HIGH (ref 10.3–14.5)
SODIUM SERPL-SCNC: 137 MMOL/L — SIGNIFICANT CHANGE UP (ref 135–145)
SPECIMEN SOURCE: SIGNIFICANT CHANGE UP
WBC # BLD: 6.53 K/UL — SIGNIFICANT CHANGE UP (ref 3.8–10.5)
WBC # FLD AUTO: 6.53 K/UL — SIGNIFICANT CHANGE UP (ref 3.8–10.5)

## 2018-08-08 PROCEDURE — 99232 SBSQ HOSP IP/OBS MODERATE 35: CPT

## 2018-08-08 PROCEDURE — 99233 SBSQ HOSP IP/OBS HIGH 50: CPT

## 2018-08-08 RX ORDER — OMALIZUMAB 150 MG/ML
0 INJECTION, SOLUTION SUBCUTANEOUS
Qty: 0 | Refills: 0 | COMMUNITY

## 2018-08-08 RX ORDER — PANTOPRAZOLE SODIUM 20 MG/1
40 TABLET, DELAYED RELEASE ORAL
Qty: 0 | Refills: 0 | COMMUNITY
Start: 2018-08-08

## 2018-08-08 RX ORDER — DEXTROSE 50 % IN WATER 50 %
25 SYRINGE (ML) INTRAVENOUS
Qty: 0 | Refills: 0 | COMMUNITY
Start: 2018-08-08

## 2018-08-08 RX ORDER — TIOTROPIUM BROMIDE 18 UG/1
1 CAPSULE ORAL; RESPIRATORY (INHALATION)
Qty: 0 | Refills: 0 | COMMUNITY

## 2018-08-08 RX ORDER — ALBUTEROL 90 UG/1
3 AEROSOL, METERED ORAL
Qty: 0 | Refills: 0 | COMMUNITY

## 2018-08-08 RX ORDER — BUDESONIDE AND FORMOTEROL FUMARATE DIHYDRATE 160; 4.5 UG/1; UG/1
2 AEROSOL RESPIRATORY (INHALATION)
Qty: 0 | Refills: 0 | COMMUNITY
Start: 2018-08-08

## 2018-08-08 RX ORDER — VALSARTAN 80 MG/1
0.5 TABLET ORAL
Qty: 0 | Refills: 0 | COMMUNITY

## 2018-08-08 RX ORDER — LIRAGLUTIDE 6 MG/ML
1.8 INJECTION SUBCUTANEOUS
Qty: 0 | Refills: 0 | COMMUNITY

## 2018-08-08 RX ORDER — OXYCODONE AND ACETAMINOPHEN 5; 325 MG/1; MG/1
1 TABLET ORAL ONCE
Qty: 0 | Refills: 0 | Status: DISCONTINUED | OUTPATIENT
Start: 2018-08-08 | End: 2018-08-08

## 2018-08-08 RX ORDER — ACETAMINOPHEN 500 MG
2 TABLET ORAL
Qty: 0 | Refills: 0 | DISCHARGE
Start: 2018-08-08

## 2018-08-08 RX ORDER — SIMETHICONE 80 MG/1
80 TABLET, CHEWABLE ORAL ONCE
Qty: 0 | Refills: 0 | Status: COMPLETED | OUTPATIENT
Start: 2018-08-08 | End: 2018-08-08

## 2018-08-08 RX ORDER — INSULIN GLARGINE 100 [IU]/ML
12 INJECTION, SOLUTION SUBCUTANEOUS
Qty: 0 | Refills: 0 | COMMUNITY

## 2018-08-08 RX ORDER — IPRATROPIUM/ALBUTEROL SULFATE 18-103MCG
3 AEROSOL WITH ADAPTER (GRAM) INHALATION
Qty: 0 | Refills: 0 | COMMUNITY
Start: 2018-08-08

## 2018-08-08 RX ORDER — INSULIN ASPART 100 [IU]/ML
0 INJECTION, SOLUTION SUBCUTANEOUS
Qty: 0 | Refills: 0 | COMMUNITY

## 2018-08-08 RX ADMIN — Medication 1: at 18:03

## 2018-08-08 RX ADMIN — ENOXAPARIN SODIUM 40 MILLIGRAM(S): 100 INJECTION SUBCUTANEOUS at 11:25

## 2018-08-08 RX ADMIN — Medication 3: at 12:05

## 2018-08-08 RX ADMIN — Medication 1: at 08:51

## 2018-08-08 RX ADMIN — PANTOPRAZOLE SODIUM 40 MILLIGRAM(S): 20 TABLET, DELAYED RELEASE ORAL at 11:25

## 2018-08-08 RX ADMIN — Medication 0.25 MILLIGRAM(S): at 11:25

## 2018-08-08 RX ADMIN — SIMETHICONE 80 MILLIGRAM(S): 80 TABLET, CHEWABLE ORAL at 09:50

## 2018-08-08 RX ADMIN — BUDESONIDE AND FORMOTEROL FUMARATE DIHYDRATE 2 PUFF(S): 160; 4.5 AEROSOL RESPIRATORY (INHALATION) at 09:58

## 2018-08-08 RX ADMIN — Medication 3 MILLILITER(S): at 09:58

## 2018-08-08 RX ADMIN — TIOTROPIUM BROMIDE 1 CAPSULE(S): 18 CAPSULE ORAL; RESPIRATORY (INHALATION) at 11:25

## 2018-08-08 RX ADMIN — OXYCODONE AND ACETAMINOPHEN 1 TABLET(S): 5; 325 TABLET ORAL at 17:43

## 2018-08-08 RX ADMIN — CHLORHEXIDINE GLUCONATE 1 APPLICATION(S): 213 SOLUTION TOPICAL at 08:27

## 2018-08-08 RX ADMIN — Medication 20 MILLIGRAM(S): at 06:40

## 2018-08-08 RX ADMIN — OXYCODONE AND ACETAMINOPHEN 1 TABLET(S): 5; 325 TABLET ORAL at 17:13

## 2018-08-08 NOTE — PROGRESS NOTE ADULT - SUBJECTIVE AND OBJECTIVE BOX
CHIEF COMPLAINT: f/up for asthma, TBM, bronchiectasis/chronic bronchitis, allergies--    Interval Events:    REVIEW OF SYSTEMS:  Constitutional: No fevers or chills. No weight loss. No fatigue or generalized malaise.  Eyes: No itching or discharge from the eyes  ENT: No ear pain. No ear discharge. No nasal congestion. No post nasal drip. No epistaxis. No throat pain. No sore throat. No difficulty swallowing.   CV: No chest pain. No palpitations. No lightheadedness or dizziness.   Resp: No dyspnea at rest. No dyspnea on exertion. No orthopnea. No wheezing. No cough. No stridor. No sputum production. No chest pain with respiration.  GI: No nausea. No vomiting. No diarrhea.  MSK: No joint pain or pain in any extremities  Integumentary: No skin lesions. No pedal edema.  Neurological: No gross motor weakness. No sensory changes.  [ ] All other systems negative  [ ] Unable to assess ROS because ________    OBJECTIVE:  ICU Vital Signs Last 24 Hrs  T(C): 37.3 (08 Aug 2018 01:17), Max: 37.3 (07 Aug 2018 06:18)  T(F): 99.1 (08 Aug 2018 01:17), Max: 99.1 (07 Aug 2018 06:18)  HR: 78 (08 Aug 2018 01:17) (78 - 85)  BP: 112/- (08 Aug 2018 01:17) (107/62 - 127/71)  BP(mean): 64 (08 Aug 2018 01:17) (64 - 64)  ABP: --  ABP(mean): --  RR: 18 (08 Aug 2018 01:17) (18 - 18)  SpO2: 95% (08 Aug 2018 01:17) (94% - 97%)        08-06 @ 07:01  -  08-07 @ 07:00  --------------------------------------------------------  IN: 2840 mL / OUT: 1900 mL / NET: 940 mL    08-07 @ 07:01  -  08-08 @ 05:05  --------------------------------------------------------  IN: 780 mL / OUT: 500 mL / NET: 280 mL      CAPILLARY BLOOD GLUCOSE      POCT Blood Glucose.: 140 mg/dL (07 Aug 2018 22:01)      PHYSICAL EXAM:  General: Awake, alert, oriented X 3.   HEENT: Atraumatic, normocephalic.                 Mallampatti Grade                 No nasal congestion.                No tonsillar or pharyngeal exudates.  Lymph Nodes: No palpable lymphadenopathy  Neck: No JVD. No carotid bruit.   Respiratory: Normal chest expansion                         Normal percussion                         Normal and equal air entry                         No wheeze, rhonchi or rales.  Cardiovascular: S1 S2 normal. No murmurs, rubs or gallops.   Abdomen: Soft, non-tender, non-distended. No organomegaly.  Extremities: Warm to touch. Peripheral pulse palpable. No pedal edema.   Skin: No rashes or skin lesions  Neurological: Motor and sensory examination equal and normal in all four extremities.  Psychiatry: Appropriate mood and affect.    HOSPITAL MEDICATIONS:  MEDICATIONS  (STANDING):  ALBUTerol/ipratropium for Nebulization 3 milliLiter(s) Nebulizer every 6 hours  buDESOnide 160 MICROgram(s)/formoterol 4.5 MICROgram(s) Inhaler 2 Puff(s) Inhalation two times a day  cefTRIAXone   IVPB 1 Gram(s) IV Intermittent every 24 hours  chlorhexidine 4% Liquid 1 Application(s) Topical <User Schedule>  dextrose 5%. 1000 milliLiter(s) (50 mL/Hr) IV Continuous <Continuous>  dextrose 50% Injectable 12.5 Gram(s) IV Push once  dextrose 50% Injectable 25 Gram(s) IV Push once  dextrose 50% Injectable 25 Gram(s) IV Push once  digoxin  Injectable 0.25 milliGRAM(s) IV Push daily  enoxaparin Injectable 40 milliGRAM(s) SubCutaneous daily  hydrocortisone sodium succinate Injectable 20 milliGRAM(s) IV Push daily  insulin lispro (HumaLOG) corrective regimen sliding scale   SubCutaneous three times a day before meals  insulin lispro (HumaLOG) corrective regimen sliding scale   SubCutaneous at bedtime  ondansetron Injectable 4 milliGRAM(s) IV Push once  pantoprazole  Injectable 40 milliGRAM(s) IV Push daily  tiotropium 18 MICROgram(s) Capsule 1 Capsule(s) Inhalation daily    MEDICATIONS  (PRN):  acetaminophen   Tablet. 650 milliGRAM(s) Oral every 6 hours PRN Moderate Pain (4 - 6)  ALBUTerol    90 MICROgram(s) HFA Inhaler 2 Puff(s) Inhalation every 6 hours PRN Shortness of Breath  dextrose 40% Gel 15 Gram(s) Oral once PRN Blood Glucose LESS THAN 70 milliGRAM(s)/deciliter  diphenhydrAMINE   Injectable 12.5 milliGRAM(s) IV Push every 4 hours PRN Itching  glucagon  Injectable 1 milliGRAM(s) IntraMuscular once PRN Glucose LESS THAN 70 milligrams/deciliter  metoclopramide Injectable 10 milliGRAM(s) IV Push every 6 hours PRN Nausea  ondansetron    Tablet 4 milliGRAM(s) Oral two times a day PRN Nausea  tetracaine/benzocaine/butamben Spray 1 Spray(s) Topical four times a day PRN NGT irritation      LABS:                        8.9    7.31  )-----------( 513      ( 07 Aug 2018 09:25 )             28.3     08-07    133<L>  |  98  |  <4<L>  ----------------------------<  235<H>  3.9   |  24  |  0.60    Ca    7.9<L>      07 Aug 2018 07:20  Phos  3.0     08-07  Mg     1.7     08-07                MICROBIOLOGY:     RADIOLOGY:  [ ] Reviewed and interpreted by me    Point of Care Ultrasound Findings:    PFT:    EKG: CHIEF COMPLAINT: f/up for asthma, TBM, bronchiectasis/chronic bronchitis, allergies--better--much better breathing-less cough and sob; BM--less rectal pain    Interval Events: ambulating and eating again    REVIEW OF SYSTEMS:  Constitutional: No fevers or chills. No weight loss. + fatigue or generalized malaise.  Eyes: No itching or discharge from the eyes  ENT: No ear pain. No ear discharge. No nasal congestion. No post nasal drip. No epistaxis. No throat pain. No sore throat. No difficulty swallowing.   CV: No chest pain. No palpitations. No lightheadedness or dizziness.   Resp: No dyspnea at rest. + dyspnea on exertion. No orthopnea. No wheezing. No cough. No stridor. No sputum production. No chest pain with respiration.  GI: No nausea. No vomiting. No diarrhea.  MSK: No joint pain or pain in any extremities  Integumentary: No skin lesions. No pedal edema.  Neurological: No gross motor weakness. No sensory changes.  [ +] All other systems negative  [ ] Unable to assess ROS because ________    OBJECTIVE:  ICU Vital Signs Last 24 Hrs  T(C): 37.3 (08 Aug 2018 01:17), Max: 37.3 (07 Aug 2018 06:18)  T(F): 99.1 (08 Aug 2018 01:17), Max: 99.1 (07 Aug 2018 06:18)  HR: 78 (08 Aug 2018 01:17) (78 - 85)  BP: 112/- (08 Aug 2018 01:17) (107/62 - 127/71)  BP(mean): 64 (08 Aug 2018 01:17) (64 - 64)  ABP: --  ABP(mean): --  RR: 18 (08 Aug 2018 01:17) (18 - 18)  SpO2: 95% (08 Aug 2018 01:17) (94% - 97%)        08-06 @ 07:01  -  08-07 @ 07:00  --------------------------------------------------------  IN: 2840 mL / OUT: 1900 mL / NET: 940 mL    08-07 @ 07:01  -  08-08 @ 05:05  --------------------------------------------------------  IN: 780 mL / OUT: 500 mL / NET: 280 mL      CAPILLARY BLOOD GLUCOSE      POCT Blood Glucose.: 140 mg/dL (07 Aug 2018 22:01)      PHYSICAL EXAM: NAD in bed  General: Awake, alert, oriented X 3.   HEENT: Atraumatic, normocephalic.                 Mallampatti Grade 2                No nasal congestion.                No tonsillar or pharyngeal exudates.  Lymph Nodes: No palpable lymphadenopathy  Neck: No JVD. No carotid bruit.   Respiratory: Normal chest expansion                         Normal percussion                         Normal and equal air entry                         No wheeze, rhonchi or rales.  Cardiovascular: S1 S2 normal. + murmurs, rubs or gallops.   Abdomen: Soft, non-tender, non-distended. No organomegaly. NABS  Extremities: Warm to touch. Peripheral pulse palpable. No pedal edema.   Skin: No rashes or skin lesions  Neurological: Motor and sensory examination equal and normal in all four extremities.  Psychiatry: Appropriate mood and affect.    HOSPITAL MEDICATIONS:  MEDICATIONS  (STANDING):  ALBUTerol/ipratropium for Nebulization 3 milliLiter(s) Nebulizer every 6 hours  buDESOnide 160 MICROgram(s)/formoterol 4.5 MICROgram(s) Inhaler 2 Puff(s) Inhalation two times a day  cefTRIAXone   IVPB 1 Gram(s) IV Intermittent every 24 hours  chlorhexidine 4% Liquid 1 Application(s) Topical <User Schedule>  dextrose 5%. 1000 milliLiter(s) (50 mL/Hr) IV Continuous <Continuous>  dextrose 50% Injectable 12.5 Gram(s) IV Push once  dextrose 50% Injectable 25 Gram(s) IV Push once  dextrose 50% Injectable 25 Gram(s) IV Push once  digoxin  Injectable 0.25 milliGRAM(s) IV Push daily  enoxaparin Injectable 40 milliGRAM(s) SubCutaneous daily  hydrocortisone sodium succinate Injectable 20 milliGRAM(s) IV Push daily  insulin lispro (HumaLOG) corrective regimen sliding scale   SubCutaneous three times a day before meals  insulin lispro (HumaLOG) corrective regimen sliding scale   SubCutaneous at bedtime  ondansetron Injectable 4 milliGRAM(s) IV Push once  pantoprazole  Injectable 40 milliGRAM(s) IV Push daily  tiotropium 18 MICROgram(s) Capsule 1 Capsule(s) Inhalation daily    MEDICATIONS  (PRN):  acetaminophen   Tablet. 650 milliGRAM(s) Oral every 6 hours PRN Moderate Pain (4 - 6)  ALBUTerol    90 MICROgram(s) HFA Inhaler 2 Puff(s) Inhalation every 6 hours PRN Shortness of Breath  dextrose 40% Gel 15 Gram(s) Oral once PRN Blood Glucose LESS THAN 70 milliGRAM(s)/deciliter  diphenhydrAMINE   Injectable 12.5 milliGRAM(s) IV Push every 4 hours PRN Itching  glucagon  Injectable 1 milliGRAM(s) IntraMuscular once PRN Glucose LESS THAN 70 milligrams/deciliter  metoclopramide Injectable 10 milliGRAM(s) IV Push every 6 hours PRN Nausea  ondansetron    Tablet 4 milliGRAM(s) Oral two times a day PRN Nausea  tetracaine/benzocaine/butamben Spray 1 Spray(s) Topical four times a day PRN NGT irritation      LABS:                        8.9    7.31  )-----------( 513      ( 07 Aug 2018 09:25 )             28.3     08-07    133<L>  |  98  |  <4<L>  ----------------------------<  235<H>  3.9   |  24  |  0.60    Ca    7.9<L>      07 Aug 2018 07:20  Phos  3.0     08-07  Mg     1.7     08-07                MICROBIOLOGY:     RADIOLOGY:  [ ] Reviewed and interpreted by me    Point of Care Ultrasound Findings:    PFT:    EKG:

## 2018-08-08 NOTE — PROGRESS NOTE ADULT - PROBLEM SELECTOR PROBLEM 3
Chronic bronchitis

## 2018-08-08 NOTE — PROGRESS NOTE ADULT - PROBLEM SELECTOR PLAN 1
severe persistent--on out pt xolair  symbicort 160 2 bid  spiriva 1 puff q day  duoneb via hhn q 6h  pulmonary toilet-incentive spirometry, Chest Pt-acapella/chest vest-up to 5 times per day.    maintain oxygen levels above 90%-supplemental oxygen via nasal canula-humidified    All nebulized therapy is to be administered via hand held nebulizer-(patient must gargle and spit with water after use)

## 2018-08-08 NOTE — PROGRESS NOTE ADULT - PROBLEM SELECTOR PROBLEM 4
Adrenal insufficiency

## 2018-08-08 NOTE — PROGRESS NOTE ADULT - PROBLEM SELECTOR PLAN 8
bs control

## 2018-08-08 NOTE — PROGRESS NOTE ADULT - PROBLEM SELECTOR PROBLEM 5
Malignant neoplasm of anal canal

## 2018-08-08 NOTE — PROGRESS NOTE ADULT - ASSESSMENT
67 yr F with PMH of COPD/asthma, tracheobronchomalacia s/p tracheo-bronchoplasty in 10/16, Afib on Eliquis , Adrenal Insufficieny on steroids,  DM2, HTN, Squamous cell CA of the anus on chemo/XRT, now s/p elective abdominoperineal proctectomy for recurrent exophytic anal cancer on 7/24/18    1. Cough:  - Currently stable pulm status  -  Sputum Cx--pseudomonas aereginosa    -  Off Antibiotic (GI) regimen of Aztreonam and Clindamycin as of 7/27 ---now on abx for UTI (ceftriaxone 8/3)  - On nebulized hypertonic saline Q12H (preceded by Duonebs) to assist with mobilization of secretions  - Cont close monitoring of resp symptoms. Optimize pain to prevent resp splinting  - Incentive spirometry/ acapella use/ OBC and continue mobilization    2. COPD/ Asthma/ Tracheomalacia s/p tracheobronchoplasty- on Duonebs Q6H  - continue Symbicort BID (takes Breo ellipta at home which is non formularly)  - continue Spiriva daily  - Once tolerating PO intake, can restart home dose of Singulair daily  - Supplemental O2 to keep sats > 90 %    SEE below as well--NGT out 7/31; 8/1-fever--cxr--c/w prior films-sputum c/w pseudomonas Aereginosa  8/3-ceftriaxone for UTI 67 yr F with PMH of COPD/asthma, tracheobronchomalacia s/p tracheo-bronchoplasty in 10/16, Afib on Eliquis , Adrenal Insufficieny on steroids,  DM2, HTN, Squamous cell CA of the anus on chemo/XRT, now s/p elective abdominoperineal proctectomy for recurrent exophytic anal cancer on 7/24/18    1. Cough:  - Currently stable pulm status  -  Sputum Cx--pseudomonas aereginosa    -  Off Antibiotic (GI) regimen of Aztreonam and Clindamycin as of 7/27 ---now on abx for UTI (ceftriaxone 8/3)  - On nebulized hypertonic saline Q12H (preceded by Duonebs) to assist with mobilization of secretions  - Cont close monitoring of resp symptoms. Optimize pain to prevent resp splinting  - Incentive spirometry/ acapella use/ OBC and continue mobilization    2. COPD/ Asthma/ Tracheomalacia s/p tracheobronchoplasty- on Duonebs Q6H  - continue Symbicort BID (takes Breo ellipta at home which is non formularly)  - continue Spiriva daily  - Once tolerating PO intake, can restart home dose of Singulair daily  - Supplemental O2 to keep sats > 90 %    SEE below as well--NGT out 7/31; 8/1-fever--cxr--c/w prior films-sputum c/w pseudomonas Aereginosa  8/3-ceftriaxone for UTI  8/7-cards--PO digoxin, stop diovan, and restart apixaban

## 2018-08-08 NOTE — PROGRESS NOTE ADULT - PROBLEM SELECTOR PLAN 7
GI prophylaxis:  PPI pre breakfast  aspiration precautions-all meals are to OOB as able  DVT prophlaxis:  subcutaneous lovenox or heparin as per primary team  sequential teds stockings  early ambulation

## 2018-08-08 NOTE — PROGRESS NOTE ADULT - PROBLEM SELECTOR PLAN 9
new onset--?source-GI, pulm --re-cullture and CXR PA/Lat, sputum etc--  CXR s/o changes and sputum pend  Source likely GI
new onset--?source-GI, pulm --re-cullture and CXR PA/Lat, sputum etc
new onset--?source-GI, pulm --re-cullture and CXR PA/Lat, sputum etc--  CXR s/o changes and sputum c/w pseudomonas--sensitivities pending  Source likely GI
new onset--?source-GI, pulm --re-cullture and CXR PA/Lat, sputum etc--  CXR s/o changes and sputum c/w pseudomonas--sensitivities pending  Source seems to be urine--ceftriaxone 8/3 initiated

## 2018-08-08 NOTE — PROGRESS NOTE ADULT - SUBJECTIVE AND OBJECTIVE BOX
Consult:  · Requested by Name:	Terrell Arango	  · Date/Time:	09-Aug-2018 	  · Reason for Referral/Consultation:	Perioperative management of cardiac conditions	      · Subjective and Objective: 	  **********              CARDIOLOGY CONSULT PROGRESS NOTE              ************  ============================================================  CHIEF COMPLAINT/REASON FOR CONSULT:  Patient is a 67y old  Female who presents with a chief complaint of rectal bleeding (2018 10:00)      HISTORY OF PRESENT ILLNESS:  67yFemale with a history of Remote Asthma, DVT, TIA, COPD, Moderate AI, Normal LVEF, pAF on Digoxin/Eliquis prior to admission, Colorectal CA p/w bleeding s/p SCC resection  - presenting for SCC resection.  No CP/SOB/Palps.         ============================================================  24 hr events  - BP controlled; HR controlled  - Continues on IV Dig  - Tolerating PO  - No reported worsening CP/Palps/SOB   ============================================================      Allergies    ampicillin (Short breath)  aspirin (Short breath)  Avelox (Short breath; Pruritus)  codeine (Short breath)  Dilaudid (Short breath)  iodine (Short breath; Swelling)  penicillin (Short breath)  shellfish (Anaphylaxis)  tetanus toxoid (Short breath)  Valium (Short breath)    Intolerances    	    MEDICATIONS:  heparin  Injectable 5000 Unit(s) SubCutaneous every 8 hours    aztreonam  IVPB 2000 milliGRAM(s) IV Intermittent once  clindamycin IVPB 900 milliGRAM(s) IV Intermittent once    ALBUTerol    90 MICROgram(s) HFA Inhaler 2 Puff(s) Inhalation every 6 hours PRN  ALBUTerol/ipratropium for Nebulization 3 milliLiter(s) Nebulizer every 6 hours  diphenhydrAMINE   Injectable 12.5 milliGRAM(s) IV Push every 4 hours PRN  tiotropium 18 MICROgram(s) Capsule 1 Capsule(s) Inhalation daily    morphine PCA (5 mG/mL) 30 milliLiter(s) PCA Continuous PCA Continuous  morphine PCA (5 mG/mL) Rescue Clinician Bolus 2.5 milliGRAM(s) IV Push every 15 minutes PRN    pantoprazole  Injectable 40 milliGRAM(s) IV Push daily    dextrose 40% Gel 15 Gram(s) Oral once PRN  dextrose 50% Injectable 12.5 Gram(s) IV Push once  dextrose 50% Injectable 25 Gram(s) IV Push once  dextrose 50% Injectable 25 Gram(s) IV Push once  glucagon  Injectable 1 milliGRAM(s) IntraMuscular once PRN  insulin lispro (HumaLOG) corrective regimen sliding scale   SubCutaneous every 6 hours    dextrose 5% + sodium chloride 0.45%. 1000 milliLiter(s) IV Continuous <Continuous>  dextrose 5%. 1000 milliLiter(s) IV Continuous <Continuous>      PAST MEDICAL & SURGICAL HISTORY:  TIA (transient ischemic attack): multiple, last 5 years ago - presents as right-sided weakness  DVT (deep venous thrombosis): 15-20 years ago, took coumadin  Seizure: x 1 18  Rectal bleeding  Colorectal cancer: 2018- last treatment , chemo and radiation  Tracheobronchomalacia: diagnosed , s/p bronchial thermoplasty  (Dr Zapien); recent bronchoscopy 2018 revealed no evidence of tracheobronchomalacia in trachea or bronchial tubes  Asthma  Pelvic fracture  Aortic insufficiency: moderate AR on echo 5/3/2018  Adrenal insufficiency: Medrol daily for over 50 years  COPD (chronic obstructive pulmonary disease)  Diabetes: Type 2  Atrial fibrillation: paroxysmal, on eliquis  Rectal bleeding: exam under anesthesia ([facility]) 2018  S/P bronchoscopy: 2018 - [facility] (Dr Zapien) no evidence of tracheobronchomalacia in trachea or bronchial tubes  History of tracheomalacia:  - attempted tracheal stenting ([facility])- course complicated by obstruction, respiratory failure, multiple CPR attempts -  stent discontinued; 10/20/2016 Tracheobronchoplasty (Prolene Mesh) performed at [facility] by Dr Zapien  H/O pelvic surgery: 5 years ago - s/p fracture  Exostosis of orbit, 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  History of sinus surgery: multiple sinus surgeries  H/O total knee replacement, bilateral: 5 years ago  History of partial hysterectomy: 30 years ago - fibroids      FAMILY HISTORY:  Family history of diabetes mellitus type II  Family history of breast cancer (Sibling)  Family history of asthma    NC -   Mother - HTN  Father - DM    SOCIAL HISTORY:    [ x] Non-smoker  [x] No Illicit Drug Use  [ x] No Excess EtOH Use      REVIEW OF SYSTEMS: (Unless + Before Symptom, it is negative)  Constitutional: No Fever, +Fatigue, Weight Changes  Eyes: No Recent Vision Changes, Eye Pain  ENT: No Congestion, Sore Throat  Endocrine: No Excess Sweating, Temperature Intolerance  Cardiovascular: No Chest Pain, Palpitations, Shortness of Breath, Pre-syncope, Syncope, LE Edema  Respiratory: No Cough, Congestion, Wheezing  Gastrointestinal: +Abdominal Pain, Nausea, Vomiting  Genitourinary: No dysuria, hematuria  Musculoskeletal: No Joint Pain, Swelling  Neurologic: No headaches, Imbalance, Weakness  Skin: No rashes, hematoma, purprura    ================================    PHYSICAL EXAM:  Vital Signs Last 24 Hrs  T(C): 36.8 (01 Aug 2018 16:33), Max: 39.5 (2018 21:00)  T(F): 98.2 (01 Aug 2018 16:33), Max: 103.1 (2018 21:00)  HR: 70 (01 Aug 2018 16:33) (70 - 101)  BP: 117/64 (01 Aug 2018 16:33) (95/62 - 143/83)  BP(mean): --  RR: 18 (01 Aug 2018 16:33) (10 - 18)  SpO2: 96% (01 Aug 2018 16:33) (94% - 98%)    Appearance: Normal; NAD	  HEENT:   Normal oral mucosa, EOMI	  Lymphatic: No lymphadenopathy  Cardiovascular: Normal S1 S2, No JVD, No murmurs, No edema  Respiratory: Lungs clear to auscultation, no use of accessory muscles	  Psychiatry: A & O x 3, Mood & affect appropriate  Gastrointestinal:  Soft, +Distended +Less Tender +Ostomy  Skin: No rashes, No ecchymoses, No cyanosis	  Neurologic: Non-focal, No Focal Deficits  Extremities: Normal range of motion, No clubbing, cyanosis or edema  Vascular: Peripheral pulses palpable 2+ bilaterally, no prominent varicosities    ============================    LABS:	   Labs [id number]    CBC Full  -  ( 2018 04:32 )  WBC Count : 14.8 K/uL  Hemoglobin : 11.4 g/dL  Hematocrit : 37.5 %  Platelet Count - Automated : 201 K/uL  Mean Cell Volume : 76.1 fl  Mean Cell Hemoglobin : 23.1 pg  Mean Cell Hemoglobin Concentration : 30.3 gm/dL  Auto Neutrophil # : x  Auto Lymphocyte # : x  Auto Monocyte # : x  Auto Eosinophil # : x  Auto Basophil # : x  Auto Neutrophil % : x  Auto Lymphocyte % : x  Auto Monocyte % : x  Auto Eosinophil % : x  Auto Basophil % : x    07-    142  |  106  |  10  ----------------------------<  191<H>  4.6   |  26  |  0.77  07-25    140  |  104  |  9   ----------------------------<  201<H>  4.4   |  25  |  0.73    Ca    8.2<L>      2018 04:32  Ca    8.2<L>      2018 00:16  Phos  4.2     07-25  Phos  4.1     07-25  Mg     2.1     -25  Mg     2.0     -25          =========================================================================  CXR:  < from: Xray Chest 1 View- PORTABLE-Urgent (18 @ 16:02) >    EXAM:  XR CHEST PORTABLE URGENT 1V                          PROCEDURE DATE:  2018          INTERPRETATION:  Portable Chest X-Ray dated 2018 4:02 PM    Indication: s/p bronch    Prior studies: 2018    An AP portable view of the chest revealed cardiomegaly. Trachea midline.   No pneumothorax seen. Trace right pleural effusion. Increased markings in   the left lower lobe. Right Port-A-Cath with the tip in the right atrium.    IMPRESSION:  No pneumothorax seen. Trace right pleural effusion.             "Thank you for the opportunity to participate in the care of this   patient."    < end of copied text >      ECG:  	    PREVIOUS DIAGNOSTIC TESTING:    [X] Echocardiogram:  < from: Echocardiogram (18 @ 10:55) >      INTERPRETATION:  Patient Height: 170.2 cm  Patient Weight: 73.0 kg  Heart Rate: 70 bpm  Systolic Pressure: 132 mmHg  Diastolic Pressure: 70 mmHg  BSA: 1.8 m^2  Interpretation Summary  The left atrial size is normal. Right atrial size is normal.There is mild   aortic valve thickening. The aortic valve is trileaflet. There is   moderate   aortic regurgitation. No hemodynamically significantvalvular aortic   stenosis.    There is mild mitral valve thickening. There is mild mitral annular   calcification. There is trace mitral regurgitation.  Structurally normal   tricuspid valve. There is trace tricuspid regurgitation.There was   insufficient   TR detected from which to calculate pulmonary artery systolic pressure.    The   pulmonic valve is not well visualized. No pulmonic regurgitation   noted.The   right ventricle is normal in size and function.There is moderate   concentric   left ventricular hypertrophy. The left ventricular wall motion is   normal. The   left ventricular ejection fraction is 63%.  No aortic root   dilatation.There is   no pericardial effusion.When compared to prior study performed on   10/21/16,   there isno significant change.  Procedure Details  A complete two-dimensional transthoracic echocardiogram was performed (2D,  M-mode, spectral and color flow doppler).  Study Quality: Fair.  Left Ventricle  There is moderate concentric left ventricular hypertrophy.  The left ventricular wall motion is normal.  The left ventricular ejection fraction is 63%.  Left Atrium  The left atrial size is normal.  Right Atrium  Right atrial size is normal.  Right Ventricle  The right ventricle is normal in size andfunction.  Aortic Valve  There is mild aortic valve thickening.  The aortic valve is trileaflet.  There is moderate aortic regurgitation.  No hemodynamically significant valvular aortic stenosis.  Mitral Valve  There is mild mitral valve thickening.  There is mild mitral annular calcification.  There is trace mitral regurgitation.  Tricuspid Valve  Structurally normal tricuspid valve.  There was insufficient TR detected from which to calculate pulmonary   artery  systolic pressure.  There is trace tricuspid regurgitation.  Pulmonic Valve  The pulmonic valve is not well visualized.  No pulmonic regurgitation noted.  Arteries and Venous System  No aortic root dilatation.  The inferior vena cava is normal in size (<2.1 cm) with normal inspiratory  collapse (>50%) consistent with normal right atrial pressure.  Pericardium / Pleura  There is no pericardial effusion.  Doppler Measurements & Calculations  MV E point: 67.9 cm/sec  MV A point: 91.3 cm/sec  MV E/A: 0.7  MV dec time: 0.2 sec  Ao V2 max: 172.4 cm/sec  Ao max P.9 mmHg  Ao max PG (full): 5.5 mmHg  SUSANA(V,A): 2.4 cm^2  SUSANA(V,D): 2.4 cm^2  AI max lynette: 399.1 cm/sec  AI max P.7 mmHg  AI dec slope: 275.9 cm/sec^2  AI P1/2t: 423.7 msec  LV max P.4 mmHg  LV V1 max: 126.5 cm/sec  MMode 2D Measurements & Calculations  IVSd: 1.3 cm  LVIDd: 4.2 cm  LVIDs: 2.9 cm  LVPWd: 1.1 cm  IVS/LVPW: 1.2  FS: 31.3 %  EDV(Teich): 80.1 ml  ESV(Teich): 32.5 ml  LV mass(C)d: 180.5 grams  LV mass(C)dI: 97.9 grams/m^2  SI(cubed): 27.8 ml/m^2  Ao root diam: 3.3 cm  Ao root area: 8.8 cm^2  LA dimension: 4.6 cm  LA/Ao: 1.4  LVOT diam: 2.0 cm  LVOT area: 3.2 cm^2  LVOT area (M): 3.8 cm^2  LVLd ap4: 7.6 cm  EDV(MOD-sp4): 70 ml  LVLs ap4: 6.6 cm  ESV(MOD-sp4): 22 ml  EF(MOD-sp4): 68.6 %  LVLd ap2: 7.4 cm  EDV(MOD-sp2): 43 ml  LVLs ap2: 5.9 cm  ESV(MOD-sp2): 16 ml  EF(MOD-sp2): 62.8 %  SV(MOD-sp4): 48 ml  SI(MOD-sp4): 26.0 ml/m^2  SV(MOD-sp2): 27 ml  SI(MOD-sp2): 14.6 ml/m^2  Interpreting Physician:OMAR Dias electronically signed on   2018 12:45:03        =========================================================================  ASSESSMENT:    67yFemale with a history of Remote Asthma, DVT, TIA, COPD, Moderate AI, Normal LVEF, pAF on Digoxin/Eliquis prior to admission, Colorectal CA p/w bleeding s/p SCC resection POD#1  - presenting for SCC resection.    Recs  - Restart Apixaban unless contraindication  - Continue Digoxin -> Switch to PO  - Hold Diovan  - Careful with fluid load given AI  - Thank you for this consult.  - Will Follow      Please call with questions.     Baron Tristan MD, [facility]  [phone]

## 2018-08-08 NOTE — PROGRESS NOTE ADULT - SUBJECTIVE AND OBJECTIVE BOX
GENERAL SURGERY DAILY PROGRESS NOTE:     Subjective: Patient seen and examined. Patient stable with no overnight events. Patient denies CP/ SOB/ Palpatations. Patient denies N/V. Reports output noted in the ostomy.     MEDICATIONS  (STANDING):  ALBUTerol/ipratropium for Nebulization 3 milliLiter(s) Nebulizer every 6 hours  buDESOnide 160 MICROgram(s)/formoterol 4.5 MICROgram(s) Inhaler 2 Puff(s) Inhalation two times a day  cefTRIAXone   IVPB 1 Gram(s) IV Intermittent every 24 hours  chlorhexidine 4% Liquid 1 Application(s) Topical <User Schedule>  dextrose 5%. 1000 milliLiter(s) (50 mL/Hr) IV Continuous <Continuous>  dextrose 50% Injectable 12.5 Gram(s) IV Push once  dextrose 50% Injectable 25 Gram(s) IV Push once  dextrose 50% Injectable 25 Gram(s) IV Push once  digoxin  Injectable 0.25 milliGRAM(s) IV Push daily  enoxaparin Injectable 40 milliGRAM(s) SubCutaneous daily  hydrocortisone sodium succinate Injectable 20 milliGRAM(s) IV Push daily  insulin lispro (HumaLOG) corrective regimen sliding scale   SubCutaneous three times a day before meals  insulin lispro (HumaLOG) corrective regimen sliding scale   SubCutaneous at bedtime  ondansetron Injectable 4 milliGRAM(s) IV Push once  pantoprazole  Injectable 40 milliGRAM(s) IV Push daily  tiotropium 18 MICROgram(s) Capsule 1 Capsule(s) Inhalation daily    MEDICATIONS  (PRN):  acetaminophen   Tablet. 650 milliGRAM(s) Oral every 6 hours PRN Moderate Pain (4 - 6)  ALBUTerol    90 MICROgram(s) HFA Inhaler 2 Puff(s) Inhalation every 6 hours PRN Shortness of Breath  dextrose 40% Gel 15 Gram(s) Oral once PRN Blood Glucose LESS THAN 70 milliGRAM(s)/deciliter  diphenhydrAMINE   Injectable 12.5 milliGRAM(s) IV Push every 4 hours PRN Itching  glucagon  Injectable 1 milliGRAM(s) IntraMuscular once PRN Glucose LESS THAN 70 milligrams/deciliter  metoclopramide Injectable 10 milliGRAM(s) IV Push every 6 hours PRN Nausea  ondansetron    Tablet 4 milliGRAM(s) Oral two times a day PRN Nausea  tetracaine/benzocaine/butamben Spray 1 Spray(s) Topical four times a day PRN NGT irritation      Vital Signs Last 24 Hrs  T(C): 37.1 (08 Aug 2018 06:12), Max: 37.3 (08 Aug 2018 01:17)  T(F): 98.7 (08 Aug 2018 06:12), Max: 99.1 (08 Aug 2018 01:17)  HR: 73 (08 Aug 2018 06:12) (73 - 81)  BP: 117/65 (08 Aug 2018 06:12) (107/62 - 118/69)  BP(mean): 64 (08 Aug 2018 01:17) (64 - 64)  RR: 18 (08 Aug 2018 06:12) (18 - 18)  SpO2: 94% (08 Aug 2018 06:12) (94% - 97%)    I&O's Detail:   07 Aug 2018 07:01  -  08 Aug 2018 07:00  --------------------------------------------------------  IN:    Oral Fluid: 780 mL  Total IN: 780 mL    OUT:    Colostomy: 100 mL    Voided: 400 mL  Total OUT: 500 mL  Total NET: 280 mL    LABS:                        8.9    7.31  )-----------( 513      ( 07 Aug 2018 09:25 )             28.3     08-07    133<L>  |  98  |  <4<L>  ----------------------------<  235<H>  3.9   |  24  |  0.60    Ca    7.9<L>      07 Aug 2018 07:20  Phos  3.0     08-07  Mg     1.7     08-07      Physical Exam:   Gen: NAD, AAOx3  Abdomen: soft, NT, ND   Incision: clean/dry/intact    Assessment:    67y Female who presents with Malignant neoplasm of anal canal    Plan:  -DVT PPX  -Pain control   -OOB as tolerated with assistance   -Diet as tolerated  -Await Gi Fxn   -Dispo [facility] Planning     Marry Valadez   #[id number] 08-08-18 @ 07:00

## 2018-08-08 NOTE — PROGRESS NOTE ADULT - PROBLEM SELECTOR PLAN 2
s/p tracheoplasty--incentive spirometry/acapella

## 2018-08-08 NOTE — PROGRESS NOTE ADULT - PROVIDER SPECIALTY LIST ADULT
Anesthesia
Cardiology
Colorectal Surgery
Pulmonology
Surgery
Colorectal Surgery
Anesthesia
Pulmonology

## 2018-08-08 NOTE — PROGRESS NOTE ADULT - PROBLEM SELECTOR PLAN 4
medrol 4mg q day

## 2018-08-08 NOTE — PROGRESS NOTE ADULT - ATTENDING COMMENTS
as above-Stable at present--off abx--+ ostomy function-- temps--(controlled) -Likely urine based on UA (sputum c/w pseudomonas Aereginosa---sensitivities pending--would hold on rx; UTI--ceftraixone initiated 8/3--? duration  CRS- NGT removed---+PO initiation as per CRS--clears OK'd  Pulm meds-symbicort/spiriva, duoneb, singulair                  Adrenal insufficiency-medrol 4mg  ambulate--as per CRS--pain control and motility agents.   Rehab pending return of full GI fxn.    Eulalio Allison MD-Pulmonary   [phone] as above-Stable at present--off abx--+ ostomy function-- temps--(controlled) -Likely urine based on UA (sputum c/w pseudomonas Aereginosa---sensitivities pending--would hold on rx; UTI--ceftraixone initiated 8/3--? duration (?7)  CRS- NGT removed---+PO initiation as per CRS--clears OK'd  Pulm meds-symbicort/spiriva, duoneb, singulair                  Adrenal insufficiency-medrol 4mg  Cards-po dig and re-start apixapban and DC diovan  ambulate--as per CRS--pain control and motility agents.   Rehab pending return of full GI fxn.    Eulalio Allison MD-Pulmonary   [phone]

## 2018-08-10 LAB — SURGICAL PATHOLOGY STUDY: SIGNIFICANT CHANGE UP

## 2018-08-14 ENCOUNTER — INPATIENT (INPATIENT)
Facility: HOSPITAL | Age: 68
LOS: 8 days | Discharge: NURSING, REHAB OR LONG-TERM CARE | DRG: 177 | End: 2018-08-23
Attending: HOSPITALIST | Admitting: HOSPITALIST
Payer: MEDICARE

## 2018-08-14 VITALS
SYSTOLIC BLOOD PRESSURE: 127 MMHG | RESPIRATION RATE: 20 BRPM | DIASTOLIC BLOOD PRESSURE: 72 MMHG | TEMPERATURE: 99 F | HEART RATE: 80 BPM

## 2018-08-14 DIAGNOSIS — R06.02 SHORTNESS OF BREATH: ICD-10-CM

## 2018-08-14 DIAGNOSIS — Z98.89 OTHER SPECIFIED POSTPROCEDURAL STATES: Chronic | ICD-10-CM

## 2018-08-14 DIAGNOSIS — N39.0 URINARY TRACT INFECTION, SITE NOT SPECIFIED: ICD-10-CM

## 2018-08-14 DIAGNOSIS — I10 ESSENTIAL (PRIMARY) HYPERTENSION: ICD-10-CM

## 2018-08-14 DIAGNOSIS — I48.91 UNSPECIFIED ATRIAL FIBRILLATION: ICD-10-CM

## 2018-08-14 DIAGNOSIS — T81.4XXA INFECTION FOLLOWING A PROCEDURE, INITIAL ENCOUNTER: ICD-10-CM

## 2018-08-14 DIAGNOSIS — Z98.890 OTHER SPECIFIED POSTPROCEDURAL STATES: Chronic | ICD-10-CM

## 2018-08-14 DIAGNOSIS — H05.352 EXOSTOSIS OF LEFT ORBIT: Chronic | ICD-10-CM

## 2018-08-14 DIAGNOSIS — I35.1 NONRHEUMATIC AORTIC (VALVE) INSUFFICIENCY: ICD-10-CM

## 2018-08-14 DIAGNOSIS — E11.9 TYPE 2 DIABETES MELLITUS WITHOUT COMPLICATIONS: ICD-10-CM

## 2018-08-14 DIAGNOSIS — Z29.9 ENCOUNTER FOR PROPHYLACTIC MEASURES, UNSPECIFIED: ICD-10-CM

## 2018-08-14 DIAGNOSIS — C19 MALIGNANT NEOPLASM OF RECTOSIGMOID JUNCTION: ICD-10-CM

## 2018-08-14 DIAGNOSIS — K62.5 HEMORRHAGE OF ANUS AND RECTUM: Chronic | ICD-10-CM

## 2018-08-14 DIAGNOSIS — Z87.09 PERSONAL HISTORY OF OTHER DISEASES OF THE RESPIRATORY SYSTEM: Chronic | ICD-10-CM

## 2018-08-14 DIAGNOSIS — Z96.653 PRESENCE OF ARTIFICIAL KNEE JOINT, BILATERAL: Chronic | ICD-10-CM

## 2018-08-14 DIAGNOSIS — R50.9 FEVER, UNSPECIFIED: ICD-10-CM

## 2018-08-14 PROBLEM — I82.409 ACUTE EMBOLISM AND THROMBOSIS OF UNSPECIFIED DEEP VEINS OF UNSPECIFIED LOWER EXTREMITY: Chronic | Status: ACTIVE | Noted: 2018-07-18

## 2018-08-14 PROBLEM — G45.9 TRANSIENT CEREBRAL ISCHEMIC ATTACK, UNSPECIFIED: Chronic | Status: ACTIVE | Noted: 2018-07-18

## 2018-08-14 LAB
ALBUMIN SERPL ELPH-MCNC: 2.9 G/DL — LOW (ref 3.3–5)
ALP SERPL-CCNC: 87 U/L — SIGNIFICANT CHANGE UP (ref 40–120)
ALT FLD-CCNC: 15 U/L — SIGNIFICANT CHANGE UP (ref 10–45)
ANION GAP SERPL CALC-SCNC: 14 MMOL/L — SIGNIFICANT CHANGE UP (ref 5–17)
APPEARANCE UR: ABNORMAL
APTT BLD: 33.7 SEC — SIGNIFICANT CHANGE UP (ref 27.5–37.4)
AST SERPL-CCNC: 21 U/L — SIGNIFICANT CHANGE UP (ref 10–40)
BASOPHILS # BLD AUTO: 0 K/UL — SIGNIFICANT CHANGE UP (ref 0–0.2)
BASOPHILS NFR BLD AUTO: 0.2 % — SIGNIFICANT CHANGE UP (ref 0–2)
BILIRUB SERPL-MCNC: 0.2 MG/DL — SIGNIFICANT CHANGE UP (ref 0.2–1.2)
BILIRUB UR-MCNC: NEGATIVE — SIGNIFICANT CHANGE UP
BUN SERPL-MCNC: 9 MG/DL — SIGNIFICANT CHANGE UP (ref 7–23)
CALCIUM SERPL-MCNC: 8.9 MG/DL — SIGNIFICANT CHANGE UP (ref 8.4–10.5)
CHLORIDE SERPL-SCNC: 101 MMOL/L — SIGNIFICANT CHANGE UP (ref 96–108)
CO2 SERPL-SCNC: 26 MMOL/L — SIGNIFICANT CHANGE UP (ref 22–31)
COLOR SPEC: ABNORMAL
CREAT SERPL-MCNC: 0.67 MG/DL — SIGNIFICANT CHANGE UP (ref 0.5–1.3)
DIFF PNL FLD: ABNORMAL
EOSINOPHIL # BLD AUTO: 0.3 K/UL — SIGNIFICANT CHANGE UP (ref 0–0.5)
EOSINOPHIL NFR BLD AUTO: 2.3 % — SIGNIFICANT CHANGE UP (ref 0–6)
GAS PNL BLDV: SIGNIFICANT CHANGE UP
GLUCOSE BLDC GLUCOMTR-MCNC: 237 MG/DL — HIGH (ref 70–99)
GLUCOSE SERPL-MCNC: 146 MG/DL — HIGH (ref 70–99)
GLUCOSE UR QL: NEGATIVE — SIGNIFICANT CHANGE UP
HCT VFR BLD CALC: 31.2 % — LOW (ref 34.5–45)
HGB BLD-MCNC: 9.6 G/DL — LOW (ref 11.5–15.5)
INR BLD: 1.68 RATIO — HIGH (ref 0.88–1.16)
KETONES UR-MCNC: NEGATIVE — SIGNIFICANT CHANGE UP
LEUKOCYTE ESTERASE UR-ACNC: ABNORMAL
LYMPHOCYTES # BLD AUTO: 0.6 K/UL — LOW (ref 1–3.3)
LYMPHOCYTES # BLD AUTO: 4.8 % — LOW (ref 13–44)
MCHC RBC-ENTMCNC: 23.3 PG — LOW (ref 27–34)
MCHC RBC-ENTMCNC: 30.8 GM/DL — LOW (ref 32–36)
MCV RBC AUTO: 75.9 FL — LOW (ref 80–100)
MONOCYTES # BLD AUTO: 0.9 K/UL — SIGNIFICANT CHANGE UP (ref 0–0.9)
MONOCYTES NFR BLD AUTO: 7.7 % — SIGNIFICANT CHANGE UP (ref 2–14)
NEUTROPHILS # BLD AUTO: 10.2 K/UL — HIGH (ref 1.8–7.4)
NEUTROPHILS NFR BLD AUTO: 84.9 % — HIGH (ref 43–77)
NITRITE UR-MCNC: POSITIVE
PH UR: 6 — SIGNIFICANT CHANGE UP (ref 5–8)
PLATELET # BLD AUTO: 435 K/UL — HIGH (ref 150–400)
POTASSIUM SERPL-MCNC: 4 MMOL/L — SIGNIFICANT CHANGE UP (ref 3.5–5.3)
POTASSIUM SERPL-SCNC: 4 MMOL/L — SIGNIFICANT CHANGE UP (ref 3.5–5.3)
PROT SERPL-MCNC: 6.7 G/DL — SIGNIFICANT CHANGE UP (ref 6–8.3)
PROT UR-MCNC: SIGNIFICANT CHANGE UP
PROTHROM AB SERPL-ACNC: 18.5 SEC — HIGH (ref 9.8–12.7)
RBC # BLD: 4.11 M/UL — SIGNIFICANT CHANGE UP (ref 3.8–5.2)
RBC # FLD: 15.5 % — HIGH (ref 10.3–14.5)
SODIUM SERPL-SCNC: 141 MMOL/L — SIGNIFICANT CHANGE UP (ref 135–145)
SP GR SPEC: 1.01 — LOW (ref 1.01–1.02)
UROBILINOGEN FLD QL: NEGATIVE — SIGNIFICANT CHANGE UP
WBC # BLD: 12 K/UL — HIGH (ref 3.8–10.5)
WBC # FLD AUTO: 12 K/UL — HIGH (ref 3.8–10.5)

## 2018-08-14 PROCEDURE — 71045 X-RAY EXAM CHEST 1 VIEW: CPT | Mod: 26

## 2018-08-14 PROCEDURE — 93010 ELECTROCARDIOGRAM REPORT: CPT

## 2018-08-14 PROCEDURE — 74176 CT ABD & PELVIS W/O CONTRAST: CPT | Mod: 26

## 2018-08-14 PROCEDURE — 99285 EMERGENCY DEPT VISIT HI MDM: CPT | Mod: GC

## 2018-08-14 PROCEDURE — 99223 1ST HOSP IP/OBS HIGH 75: CPT | Mod: GC

## 2018-08-14 RX ORDER — APIXABAN 2.5 MG/1
5 TABLET, FILM COATED ORAL EVERY 12 HOURS
Qty: 0 | Refills: 0 | Status: DISCONTINUED | OUTPATIENT
Start: 2018-08-14 | End: 2018-08-23

## 2018-08-14 RX ORDER — DEXTROSE 50 % IN WATER 50 %
12.5 SYRINGE (ML) INTRAVENOUS ONCE
Qty: 0 | Refills: 0 | Status: DISCONTINUED | OUTPATIENT
Start: 2018-08-14 | End: 2018-08-23

## 2018-08-14 RX ORDER — DEXTROSE 50 % IN WATER 50 %
25 SYRINGE (ML) INTRAVENOUS ONCE
Qty: 0 | Refills: 0 | Status: DISCONTINUED | OUTPATIENT
Start: 2018-08-14 | End: 2018-08-23

## 2018-08-14 RX ORDER — ALBUTEROL 90 UG/1
2 AEROSOL, METERED ORAL EVERY 6 HOURS
Qty: 0 | Refills: 0 | Status: DISCONTINUED | OUTPATIENT
Start: 2018-08-14 | End: 2018-08-15

## 2018-08-14 RX ORDER — ACETAMINOPHEN 500 MG
650 TABLET ORAL EVERY 6 HOURS
Qty: 0 | Refills: 0 | Status: DISCONTINUED | OUTPATIENT
Start: 2018-08-14 | End: 2018-08-18

## 2018-08-14 RX ORDER — MONTELUKAST 4 MG/1
10 TABLET, CHEWABLE ORAL DAILY
Qty: 0 | Refills: 0 | Status: DISCONTINUED | OUTPATIENT
Start: 2018-08-14 | End: 2018-08-23

## 2018-08-14 RX ORDER — PANTOPRAZOLE SODIUM 20 MG/1
40 TABLET, DELAYED RELEASE ORAL
Qty: 0 | Refills: 0 | Status: DISCONTINUED | OUTPATIENT
Start: 2018-08-14 | End: 2018-08-23

## 2018-08-14 RX ORDER — AZITHROMYCIN 500 MG/1
500 TABLET, FILM COATED ORAL EVERY 24 HOURS
Qty: 0 | Refills: 0 | Status: DISCONTINUED | OUTPATIENT
Start: 2018-08-14 | End: 2018-08-15

## 2018-08-14 RX ORDER — DEXTROSE 50 % IN WATER 50 %
15 SYRINGE (ML) INTRAVENOUS ONCE
Qty: 0 | Refills: 0 | Status: DISCONTINUED | OUTPATIENT
Start: 2018-08-14 | End: 2018-08-23

## 2018-08-14 RX ORDER — LEVOCETIRIZINE DIHYDROCHLORIDE 0.5 MG/ML
1 SOLUTION ORAL
Qty: 0 | Refills: 0 | COMMUNITY

## 2018-08-14 RX ORDER — SODIUM CHLORIDE 9 MG/ML
1000 INJECTION, SOLUTION INTRAVENOUS
Qty: 0 | Refills: 0 | Status: DISCONTINUED | OUTPATIENT
Start: 2018-08-14 | End: 2018-08-23

## 2018-08-14 RX ORDER — PHENAZOPYRIDINE HCL 100 MG
1 TABLET ORAL
Qty: 0 | Refills: 0 | COMMUNITY

## 2018-08-14 RX ORDER — OXYCODONE AND ACETAMINOPHEN 5; 325 MG/1; MG/1
1 TABLET ORAL EVERY 6 HOURS
Qty: 0 | Refills: 0 | Status: DISCONTINUED | OUTPATIENT
Start: 2018-08-14 | End: 2018-08-16

## 2018-08-14 RX ORDER — FLUTICASONE FUROATE AND VILANTEROL TRIFENATATE 100; 25 UG/1; UG/1
1 POWDER RESPIRATORY (INHALATION)
Qty: 0 | Refills: 0 | COMMUNITY

## 2018-08-14 RX ORDER — TIOTROPIUM BROMIDE 18 UG/1
1 CAPSULE ORAL; RESPIRATORY (INHALATION) DAILY
Qty: 0 | Refills: 0 | Status: DISCONTINUED | OUTPATIENT
Start: 2018-08-14 | End: 2018-08-15

## 2018-08-14 RX ORDER — INSULIN LISPRO 100/ML
VIAL (ML) SUBCUTANEOUS AT BEDTIME
Qty: 0 | Refills: 0 | Status: DISCONTINUED | OUTPATIENT
Start: 2018-08-14 | End: 2018-08-23

## 2018-08-14 RX ORDER — DIGOXIN 250 MCG
0.25 TABLET ORAL DAILY
Qty: 0 | Refills: 0 | Status: DISCONTINUED | OUTPATIENT
Start: 2018-08-14 | End: 2018-08-23

## 2018-08-14 RX ORDER — GLUCAGON INJECTION, SOLUTION 0.5 MG/.1ML
1 INJECTION, SOLUTION SUBCUTANEOUS ONCE
Qty: 0 | Refills: 0 | Status: DISCONTINUED | OUTPATIENT
Start: 2018-08-14 | End: 2018-08-23

## 2018-08-14 RX ORDER — AZTREONAM 2 G
1000 VIAL (EA) INJECTION ONCE
Qty: 0 | Refills: 0 | Status: COMPLETED | OUTPATIENT
Start: 2018-08-14 | End: 2018-08-14

## 2018-08-14 RX ORDER — CEFEPIME 1 G/1
2000 INJECTION, POWDER, FOR SOLUTION INTRAMUSCULAR; INTRAVENOUS EVERY 8 HOURS
Qty: 0 | Refills: 0 | Status: DISCONTINUED | OUTPATIENT
Start: 2018-08-14 | End: 2018-08-14

## 2018-08-14 RX ORDER — CEFEPIME 1 G/1
2000 INJECTION, POWDER, FOR SOLUTION INTRAMUSCULAR; INTRAVENOUS EVERY 8 HOURS
Qty: 0 | Refills: 0 | Status: DISCONTINUED | OUTPATIENT
Start: 2018-08-14 | End: 2018-08-22

## 2018-08-14 RX ORDER — POLYETHYLENE GLYCOL 3350 17 G/17G
17 POWDER, FOR SOLUTION ORAL EVERY 12 HOURS
Qty: 0 | Refills: 0 | Status: DISCONTINUED | OUTPATIENT
Start: 2018-08-14 | End: 2018-08-23

## 2018-08-14 RX ORDER — VANCOMYCIN HCL 1 G
1000 VIAL (EA) INTRAVENOUS ONCE
Qty: 0 | Refills: 0 | Status: COMPLETED | OUTPATIENT
Start: 2018-08-14 | End: 2018-08-14

## 2018-08-14 RX ORDER — AZITHROMYCIN 500 MG/1
500 TABLET, FILM COATED ORAL ONCE
Qty: 0 | Refills: 0 | Status: COMPLETED | OUTPATIENT
Start: 2018-08-14 | End: 2018-08-14

## 2018-08-14 RX ORDER — OXYCODONE HYDROCHLORIDE 5 MG/1
5 TABLET ORAL ONCE
Qty: 0 | Refills: 0 | Status: DISCONTINUED | OUTPATIENT
Start: 2018-08-14 | End: 2018-08-14

## 2018-08-14 RX ORDER — INSULIN LISPRO 100/ML
VIAL (ML) SUBCUTANEOUS
Qty: 0 | Refills: 0 | Status: DISCONTINUED | OUTPATIENT
Start: 2018-08-14 | End: 2018-08-23

## 2018-08-14 RX ADMIN — OXYCODONE AND ACETAMINOPHEN 1 TABLET(S): 5; 325 TABLET ORAL at 22:51

## 2018-08-14 RX ADMIN — CEFEPIME 100 MILLIGRAM(S): 1 INJECTION, POWDER, FOR SOLUTION INTRAMUSCULAR; INTRAVENOUS at 23:49

## 2018-08-14 RX ADMIN — APIXABAN 5 MILLIGRAM(S): 2.5 TABLET, FILM COATED ORAL at 22:52

## 2018-08-14 RX ADMIN — MONTELUKAST 10 MILLIGRAM(S): 4 TABLET, CHEWABLE ORAL at 22:52

## 2018-08-14 RX ADMIN — Medication 250 MILLIGRAM(S): at 17:16

## 2018-08-14 RX ADMIN — AZITHROMYCIN 250 MILLIGRAM(S): 500 TABLET, FILM COATED ORAL at 15:57

## 2018-08-14 RX ADMIN — ALBUTEROL 2 PUFF(S): 90 AEROSOL, METERED ORAL at 23:50

## 2018-08-14 RX ADMIN — Medication 50 MILLIGRAM(S): at 19:17

## 2018-08-14 RX ADMIN — POLYETHYLENE GLYCOL 3350 17 GRAM(S): 17 POWDER, FOR SOLUTION ORAL at 22:53

## 2018-08-14 RX ADMIN — OXYCODONE HYDROCHLORIDE 5 MILLIGRAM(S): 5 TABLET ORAL at 17:01

## 2018-08-14 RX ADMIN — OXYCODONE AND ACETAMINOPHEN 1 TABLET(S): 5; 325 TABLET ORAL at 23:21

## 2018-08-14 NOTE — H&P ADULT - PROBLEM SELECTOR PLAN 4
- TTE 5/18 reviwed, patient has moderate Aortic insufficiency with EF 63%  - hemodynamically stable at this time

## 2018-08-14 NOTE — ED PROVIDER NOTE - ATTENDING CONTRIBUTION TO CARE
I have seen and evaluated this patient with the resident.   I agree with the findings  unless other wise stated.  I have made appropriate changes in documentations where needed, After my face to face bedside evaluation, I am further  notinF s/p anal cancer resection p/w fever concerning for post-op infection. Pt ntoed to have UTI during last hosptialization. Check UA CBC CMP CT CXR and reassess. No evidence of sepsis in the ED at this time

## 2018-08-14 NOTE — H&P ADULT - PROBLEM SELECTOR PLAN 7
- pt on humalog 20 units in morning, 10 units in afternoon, 20 units in evening, victoza 1.8 mg QD  - FS reviewed on previous admission 100-250 on ISS with meals and at bedtime  - ISS  - A1c in morning

## 2018-08-14 NOTE — H&P ADULT - NSHPLABSRESULTS_GEN_ALL_CORE
9.6    12.0  )-----------( 435      ( 14 Aug 2018 13:09 )             31.2   08-14    141  |  101  |  9   ----------------------------<  146<H>  4.0   |  26  |  0.67    Ca    8.9      14 Aug 2018 13:09    TPro  6.7  /  Alb  2.9<L>  /  TBili  0.2  /  DBili  x   /  AST  21  /  ALT  15  /  AlkPhos  87  08-14  < from: CT Abdomen and Pelvis No Cont (08.14.18 @ 14:08) >    IMPRESSION:     Worsening opacity in the right lower lobe consistent with pneumonia.    No acute intra-abdominal pathology or collection.    < end of copied text > I have reviewed the labs and imaging.

## 2018-08-14 NOTE — H&P ADULT - NSHPREVIEWOFSYSTEMS_GEN_ALL_CORE
CONSTITUTIONAL: No weakness, POSITIVE fevers or chills  EYES/ENT: No visual changes;  No vertigo or throat pain   NECK: No pain or stiffness  RESPIRATORY: No cough, wheezing, hemoptysis; POSITIVE shortness of breath  CARDIOVASCULAR: No chest pain or palpitations  GASTROINTESTINAL: No abdominal or epigastric pain. No nausea, vomiting, or hematemesis; No diarrhea or constipation. No melena or hematochezia.  GENITOURINARY: No dysuria, frequency or hematuria  NEUROLOGICAL: No numbness or weakness  SKIN: No itching, burning, rashes, or lesions   All other review of systems is negative unless indicated above.

## 2018-08-14 NOTE — H&P ADULT - NSHPPHYSICALEXAM_GEN_ALL_CORE
General: WN/WD NAD  Neurology: A&Ox3, nonfocal, POOLE x 4  Eyes: PERRLA/ EOMI, Gross vision intact  ENT/Neck: Neck supple, trachea midline, No JVD, Gross hearing intact  Respiratory: diffuse expiratory wheezing and rhonchi  CV: RRR, S1S2, no murmurs, rubs or gallops  Abdominal: Soft, NT, ND +BS, colostomy bag in place c/d/i. APR wound clean, no bleeding, purulence, draining mucus  Extremities: No edema, + peripheral pulses  Skin: No Rashes, Hematoma, Ecchymosis  Incisions:   Tubes:

## 2018-08-14 NOTE — ED PROVIDER NOTE - PHYSICAL EXAMINATION
Gen: NAD, AOx3  Head: NCAT  HEENT: PERRL, oral mucosa moist, normal conjunctiva, oropharynx clear without exudate or erythema  Lung: CTAB, no respiratory distress, no wheezing, rales, rhonchi  CV: normal s1/s2, rrr, no murmurs, Normal perfusion, pulses 2+ throughout  Abd: soft, midline incision C/D/I, non-tender, ostomy pink with fluid noted in bag  MSK: No edema, no visible deformities, full range of motion in all 4 extremities  Neuro: CN II-XII grossly intact, No focal neurologic deficits  Skin: No rash   Psych: normal affect Gen: NAD, AOx3  Head: NCAT  HEENT: PERRL, oral mucosa moist, normal conjunctiva, oropharynx clear without exudate or erythema  Lung: CTAB, no respiratory distress, no wheezing, rales, rhonchi  CV: normal s1/s2, rrr, no murmurs, Normal perfusion, pulses 2+ throughout  Abd: soft, midline incision C/D/I, non-tender, ostomy pink with fluid noted in bag  MSK: No edema, no visible deformities, full range of motion in all 4 extremities  Neuro: CN II-XII grossly intact, No focal neurologic deficits  Skin: No rash   Psych: normal affect  Rectal: 2cm linear wound, no drainage bleeding or redness, no redness or tenderness noted to the perineum

## 2018-08-14 NOTE — H&P ADULT - ASSESSMENT
68 yo woman with PMH of COPD, ashtma on chronic steroids, tracheobronchomalacia s/p bronchial thermoplasty (10/16), Afib on Eliquis, HTN, adrenal insufficiency, T2DM, DVT, SCC anus (dx 2016, s/p chemo rad s/p APR 7/24/18) presenting from rehabilitation with fevers most likely 2/2 UTI    Interval History: Pulmonary: Eulalio Allison [phone]  GI surgeon: Terrell Luong; [phone]  PCP: Anastasiya Jin [phone]   Cardiologist: Steven Leahy [phone]      #Fever  - patient admitted with fevers, T max 100.2 this morning. Given history pt has slight chronic productive cough not worsened, will cough phlegm once in few days, most likely from chronic bronchitis, no rhinorrhea/sore throat to suggest URI. Has had dysuria, increased frequency, documented positive urine culture on admission 2 weeks prior treated with ceftriaxone. No purulence, skin break down around surgery site. Colostomy c/d/i.  - source of fever most likely infectious, unlikely to be inflammatory or related to malignancy. Most probably source of infection is UTI given UA+ with large LE/nitrites with dysuria/frequency/fevers. Previous urine culture 8/3 showed no growth. No suprapubic tenderness or flank pain to suggest pyelo. Given pt immunocompromised with chronic steroid use and history of diabetes treat as complicated UTI. Additionally although less likely, given CT finding of RLL opacification will treat to cover respiratory val. Previous sputum culture 8/3 pseudomonas.  - pt has allergys to ampicillin and PCN. Received 1 dose of aztreonam, vancomycin in ED. Will treat with cefepmine(GNR and pseudomonal coverage) and azithromycin(atypical) pending urine legionella. Unlikely abdominal source, if fevers persist will consider adding abdominal anerobic coverage.  - f/u blood culture, urine culture, sputum culture    #Sob  - patient states chronically she has SOB at rest and attributes it to her long history of asthma, states she feels SOB has worsened over the past week but not to point she requiring supplemental oxygen. Physical exam shows diffuse expiratory wheezing and rhonchi  - pt has history of   - continue to monitor respiratory symptoms, currently sat 94 on RA  - IS/acapella  - c/w medrol 4 mg QD    #Afib  - pt has history of paraxoysmal Afib, EKG reviewed shows  - continue with eliquis 5 mg bid  - continue with digoxin 250 daily  - continue with telemetry monitoring    #AI  - TTE 5/18 reviwed, patient has moderate Aortic insufficiency with EF 63%  - hemodynamically stable at this time    #SCC  - diagnosed in 10/16 after BRBPR 8/16, dx with anal cancer SCC, initial PET scan showed no metastatic disease, received mitomycin/xeloda and RT 5/17 to 6/17. PET 1/18 showed hypermetabolism in anal area with rad onc concerned for local relapse/failure provied by biopsy 2/18  - underwent APR with Dr. Terrell Luong 7/24/18 with permanent end colostomy  - outpatient f/u with Dr. Zach King    #APR  - - underwent APR with Dr. Terrell Luong 7/24/18 with permanent end colostomy.   - on exam no bleeding, purulence around wound however it is draining what appears to been mucus. Pt states during rehabilitation she was supposed to have wound vac on 3 times a week however was being placed twice a day because she was draining urine through the wound.   - CRC evaluated, PT wound care consult placed for replacement of wound vac  - miralax for constipation    #HTN  - pt on valsartan 40 mg QD and furosemide 20 mg QD at home  - BP systoic 120-127  - hold antihypertensives in setting of possibly systemic infection    #T2DM  - pt on humalog 20 units in morning, 10 units in afternoon, 20 units in evening, victoza 1.8 mg QD  - FS reviewed on previous admission 100-250 on ISS with meals and at bedtime  - ISS  - A1c in morning      #prophylactic    Medications:  humalog quik pen 20 units in morning 10 afternoon 20 units evening  digitek 250 QD  victoza 1.8 mg QD  valsartan 40 mg QD  furosemide 20 mg QD  eliquis 5 mg bid  pantoprazole 40 mg QD  montelukast 10 mg at bedtime  methylprednisolone 4 mg QD  alprazaolam 0.25 mg bid PRN 68 yo woman with PMH of COPD, ashtma on chronic steroids, tracheobronchomalacia s/p bronchial thermoplasty (10/16), Afib on Eliquis, HTN, adrenal insufficiency, T2DM, DVT, SCC anus (dx 2016, s/p chemo rad s/p APR 7/24/18) presenting from rehabilitation with fevers most likely 2/2 UTI 68 yo woman with PMH of COPD, ashtma on chronic steroids, tracheobronchomalacia s/p bronchial thermoplasty (10/16), Afib on Eliquis, HTN, adrenal insufficiency, T2DM, DVT, SCC anus (dx 2016, s/p chemo rad s/p APR 7/24/18) presenting from rehabilitation with fevers most likely 2/2 UTI vs PNA

## 2018-08-14 NOTE — ED ADULT NURSE NOTE - NSIMPLEMENTINTERV_GEN_ALL_ED
Implemented All Fall Risk Interventions:  [place] to call system. Call bell, personal items and telephone within reach. Instruct patient to call for assistance. Room bathroom lighting operational. Non-slip footwear when patient is off stretcher. Physically safe environment: no spills, clutter or unnecessary equipment. Stretcher in lowest position, wheels locked, appropriate side rails in place. Provide visual cue, wrist band, yellow gown, etc. Monitor gait and stability. Monitor for mental status changes and reorient to person, place, and time. Review medications for side effects contributing to fall risk. Reinforce activity limits and safety measures with patient and family.

## 2018-08-14 NOTE — ED ADULT NURSE REASSESSMENT NOTE - NS ED NURSE REASSESS COMMENT FT1
Patient appears to be resting comfortably in stretcher. Wound undressed and examined. Wound located in center of buttocks. Wound appears clean, pink and moist. No drainage noted to wound. Wound cleaned and dressed with ED MD Johnson at bedside. Safety and comfort measures provided. A&OX3.

## 2018-08-14 NOTE — H&P ADULT - PROBLEM SELECTOR PLAN 1
- patient admitted with fevers, T max 100.2 this morning. Given history pt has slight chronic productive cough not worsened, will cough phlegm once in few days, most likely from chronic bronchitis, no rhinorrhea/sore throat to suggest URI. Has had dysuria, increased frequency, documented positive urine culture on admission 2 weeks prior treated with ceftriaxone. No purulence, skin break down around surgery site. Colostomy c/d/i.  - source of fever most likely infectious, unlikely to be inflammatory or related to malignancy. Most probably source of infection is UTI given UA+ with large LE/nitrites with dysuria/frequency/fevers. Previous urine culture 8/3 showed no growth. No suprapubic tenderness or flank pain to suggest pyelo. Given pt immunocompromised with chronic steroid use and history of diabetes treat as complicated UTI. Additionally although less likely, given CT finding of RLL opacification will treat to cover respiratory val. Previous sputum culture 8/3 pseudomonas.  - pt has allergys to ampicillin and PCN. Received 1 dose of aztreonam, vancomycin in ED. Will treat with cefepmine(GNR and pseudomonal coverage) and azithromycin(atypical) pending urine legionella. Unlikely abdominal source, if fevers persist will consider adding abdominal anerobic coverage.  - f/u blood culture, urine culture, sputum cultuure - patient admitted with fevers, T max 100.2 this morning. Given history pt has slight chronic productive cough not worsened. Has had dysuria, increased frequency, documented positive urine culture on admission 2 weeks prior treated with ceftriaxone. No purulence, skin break down around surgery site. Colostomy c/d/i.  - source of fever most likely infectious. Most probably source of infection is UTI given UA+ with large LE/nitrites with dysuria/frequency/fevers vs PNA. Previous urine culture 8/3 showed no growth. No suprapubic tenderness or flank pain to suggest pyelo. Given pt immunocompromised with chronic steroid use and history of diabetes treat as complicated UTI. Additionally although less likely, given CT finding of RLL opacification will treat to cover respiratory val. Previous sputum culture 8/3 pseudomonas.  - pt has allergys to ampicillin and PCN. Received 1 dose of aztreonam, vancomycin in ED. Will treat with cefepmine(GNR and pseudomonal coverage) and azithromycin(atypical) pending urine legionella. Pt was able tolerate several days of IV ceftriaxone on prior admission. Unlikely abdominal source, if fevers persist will consider adding abdominal anerobic coverage.  - f/u blood culture, urine culture, sputum culture, urine legionella   -Cont. IV cefepime and azithromycin for now

## 2018-08-14 NOTE — ED PROVIDER NOTE - PSH
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)- course complicated by obstruction, respiratory failure, multiple CPR attempts -  stent discontinued; 10/20/2016 Tracheobronchoplasty (Prolene Mesh) performed at [facility] by Dr Zapien  Rectal bleeding  exam under anesthesia ([facility]) 2/2018  S/P bronchoscopy  6/5/2018 - [facility] (Dr Zapien) no evidence of tracheobronchomalacia in trachea or bronchial tubes

## 2018-08-14 NOTE — CONSULT NOTE ADULT - SUBJECTIVE AND OBJECTIVE BOX
HPI: 67F who was discharged to rehab on  following resection of anal cancer, with partial colectomy and ostomy creation. She returns to the ED with reported fever of 102F. She denies cough, urinary frequency, dysuria, abdominal pain, nausea and vomiting. She endorses minimal ostomy output over past 8 days.    PAST MEDICAL & SURGICAL HISTORY:  TIA (transient ischemic attack): multiple, last 5 years ago - presents as right-sided weakness  DVT (deep venous thrombosis): 15-20 years ago, took coumadin  Seizure: x 1 18  Rectal bleeding  Colorectal cancer: 2018- last treatment , chemo and radiation  Tracheobronchomalacia: diagnosed , s/p bronchial thermoplasty  (Dr Zapien); recent bronchoscopy 2018 revealed no evidence of tracheobronchomalacia in trachea or bronchial tubes  Asthma  Pelvic fracture  Aortic insufficiency: moderate AR on echo 5/3/2018  Adrenal insufficiency: Medrol daily for over 50 years  COPD (chronic obstructive pulmonary disease)  Diabetes: Type 2  Atrial fibrillation: paroxysmal, on eliquis  Rectal bleeding: exam under anesthesia ([facility]) 2018  S/P bronchoscopy: 2018 - [facility] (Dr Zapien) no evidence of tracheobronchomalacia in trachea or bronchial tubes  History of tracheomalacia:  - attempted tracheal stenting ([facility])- course complicated by obstruction, respiratory failure, multiple CPR attempts -  stent discontinued; 10/20/2016 Tracheobronchoplasty (Prolene Mesh) performed at [facility] by Dr Zapien  H/O pelvic surgery: 5 years ago - s/p fracture  Exostosis of orbit, 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  LABS:                         9.6    12.0  )-----------( 435      ( 14 Aug 2018 13:09 )             31.2     08-14    141  |  101  |  9   ----------------------------<  146<H>  4.0   |  26  |  0.67    Ca    8.9      14 Aug 2018 13:09    TPro  6.7  /  Alb  2.9<L>  /  TBili  0.2  /  DBili  x   /  AST  21  /  ALT  15  /  AlkPhos  87  08-14    PT/INR - ( 14 Aug 2018 13:09 )   PT: 18.5 sec;   INR: 1.68 ratio         PTT - ( 14 Aug 2018 13:09 )  PTT:33.7 sec  Urinalysis Basic - ( 14 Aug 2018 14:05 )    Color: Orange / Appearance: SL Turbid / S.008 / pH: x  Gluc: x / Ketone: Negative  / Bili: Negative / Urobili: Negative   Blood: x / Protein: Trace / Nitrite: Positive   Leuk Esterase: Large / RBC: 0-2 /HPF / WBC >50 /HPF   Sq Epi: x / Non Sq Epi: Occasional /HPF / Bacteria: Few /HPF            RADIOLOGY, EKG & ADDITIONAL TESTS: Reviewed.     CT CAP: showing pneumonia, no acute intraabdominal processes, large stool burden

## 2018-08-14 NOTE — ED ADULT NURSE NOTE - OBJECTIVE STATEMENT
67f comes to ED  via EMS. She states she is recent post op s/p colon resection. She has new colostomy bag to [facility] and has wound to rectum from tumor resection. Patient states she was sent to ED for low grade fever, wound vac not staying in place and low ostomy output. States she has had ostomy output in the past and has been having gas in her ostomy, but no formed stool x8 days. Abdomen appears distended but is nonpainful. Still has linear staples to abdomen from surgery. She is a&ox3 in no acute distress. States she was treated for UTI in the hospital and denies urinary symptoms. Denies SOB/chest pain/cough/chills/change in abdominal pain/palpitations. Will continue to monitor.

## 2018-08-14 NOTE — H&P ADULT - PROBLEM SELECTOR PLAN 2
- patient states chronically she has SOB at rest and attributes it to her long history of asthma, states she feels SOB has worsened over the past week but not to point she requiring supplemental oxygen. Physical exam shows diffuse expiratory wheezing and rhonchi  - pt has history of   - continue to monitor respiratory symptoms, currently sat 94 on RA  - IS/acapella  - c/w medrol 4 mg QD - patient states chronically she has SOB at rest and attributes it to her long history of asthma, states she feels SOB has worsened over the past week but not to point she requiring supplemental oxygen. Physical exam shows diffuse expiratory wheezing and rhonchi  - continue to monitor respiratory symptoms, currently sat 94 on RA  - IS/acapella  - c/w medrol 4 mg QD

## 2018-08-14 NOTE — H&P ADULT - PROBLEM SELECTOR PLAN 3
- pt has history of paraxoysmal Afib, EKG reviewed shows  - continue with eliquis 5 mg bid  - continue with digoxin 250 daily  - continue with telemetry monitoring - pt has history of paraxoysmal Afib, EKG ordered  - continue with eliquis 5 mg bid  - continue with digoxin 250 daily  - continue with telemetry monitoring

## 2018-08-14 NOTE — ED PROVIDER NOTE - PROGRESS NOTE DETAILS
Araceli PGY-3: Pt seen by surgery. CT scan showing RLL PNA. UA positive. Will give vanc aztreonam and azithromycin. Awaiting surgery attending input.

## 2018-08-14 NOTE — ED ADULT NURSE REASSESSMENT NOTE - NS ED NURSE REASSESS COMMENT FT1
patient has PIV. She states "I do not want anything infusing through my IV, I want my port accessed". Dr Charles aware.

## 2018-08-14 NOTE — ED PROVIDER NOTE - PMH
Adrenal insufficiency  Medrol daily for over 50 years  Aortic insufficiency  moderate AR on echo 5/3/2018  Asthma    Atrial fibrillation  paroxysmal, on eliquis  Colorectal cancer  4/2018- last treatment , chemo and radiation  COPD (chronic obstructive pulmonary disease)    Diabetes  Type 2  DVT (deep venous thrombosis)  15-20 years ago, took coumadin  Pelvic fracture    Rectal bleeding    Seizure  x 1 1/7/18  TIA (transient ischemic attack)  multiple, last 5 years ago - presents as right-sided weakness  Tracheobronchomalacia  diagnosed 2015, s/p bronchial thermoplasty 2016 (Dr Zapien); recent bronchoscopy 6/5/2018 revealed no evidence of tracheobronchomalacia in trachea or bronchial tubes

## 2018-08-14 NOTE — H&P ADULT - PROBLEM SELECTOR PLAN 6
- pt on valsartan 40 mg QD and furosemide 20 mg QD at home  - BP systoic 120-127  - hold antihypertensives in setting of possibly systemic infection

## 2018-08-14 NOTE — H&P ADULT - HISTORY OF PRESENT ILLNESS
The patient is a 68 yo woman with PMH of COPD, ashtma on chronic steroids, tracheobronchomalacia s/p bronchial thermoplasty (10/16), Afib on Eliquis, HTN, adrenal insufficiency, T2DM, DVT, SCC anus (dx 2016, s/p chemo rad s/p APR 7/24/18) presenting from rehabiliation with fevers. The patient stated she felt subjective fevers yesterday to T max 100.2 this morning, she was informed given she is on steroids T > 99 would be fever, prompting her to come to the ED. She is denying any chills URI symptoms of rhinorrhea, sore throat, dysphagia. She has cough that is sometimes productive with greenish phlegm however she has had it for several years. She feels increased SOB non-exertional from decreased inspiratory effort. During patient's previous admission end of July 2018 she developed fevers with positive urine culture. She was treated with 7 day course of ceftriaxone. She is reporting continued dysuria, increased frequency, incontinence. The patient ostomy has not emptied since she was in rehabilitation, she is citing functiong during previous admission prior to rehabilitation. She is denying any sick contacts.    The patient developed BRBPR in 8/16, diagnosed with SCC in 10/16, underwent The patient is a 66 yo woman with PMH of COPD, ashtma on chronic steroids, tracheobronchomalacia s/p bronchial thermoplasty (10/16), Afib on Eliquis, HTN, adrenal insufficiency, T2DM, DVT, SCC anus (dx 2016, s/p chemo rad s/p APR 7/24/18) presenting from rehabiliation with fevers. The patient stated she felt subjective fevers yesterday to T max 100.2 this morning, she was informed given she is on steroids T > 99 would be fever, prompting her to come to the ED. She is denying any chills URI symptoms of rhinorrhea, sore throat, dysphagia. She has cough that is sometimes productive with greenish phlegm however she has had it for several years. She feels increased SOB non-exertional from decreased inspiratory effort. During patient's previous admission end of July 2018 she developed fevers with positive urine culture. She was treated with 7 day course of ceftriaxone. She is reporting continued dysuria, increased frequency, incontinence. The patient ostomy has not emptied since she was in rehabilitation, she is citing functiong during previous admission prior to rehabilitation. She is denying any sick contacts.    In the ED:  Vitals: /72, HR 80, RR 20, T 98.6  Labs: WBC 12.0 , Hgb 9.6, Platelet 482, Na 141, k+ 4.0, Cr 0.67, Glucose 146  Imaging: CT A/P no abdominal pathology, RLL opacification  Medications: azithromycin x 1, aztreonam x 1, vanc x 1 The patient is a 68 yo woman with PMH of COPD, ashtma on chronic steroids, tracheobronchomalacia s/p bronchial thermoplasty (10/16), Afib on Eliquis, HTN, adrenal insufficiency, T2DM, DVT, SCC anus (dx 2016, s/p chemo rad s/p APR 7/24/18) presenting from rehabiliation with fevers. The patient stated she felt subjective fevers yesterday to T max 100.2 this morning, she was informed given she is on steroids T > 99 would be fever, prompting her to come to the ED. She is denying any chills URI symptoms of rhinorrhea, sore throat, dysphagia. She has cough that is sometimes productive with greenish phlegm however she has had it for several years. She feels increased SOB non-exertional from decreased inspiratory effort. During patient's previous admission end of July 2018 she developed fevers with positive urine culture. She was treated with 7 day course of ceftriaxone. She is reporting continued dysuria, increased frequency, incontinence. The patient ostomy has not emptied since she was in rehabilitation, she is citing functiong during previous admission prior to rehabilitation. She is denying any sick contacts.    In the ED:  Vitals: /72, HR 80, RR 20, T 98.6  Labs: WBC 12.0 , Hgb 9.6, Platelet 482, Na 141, k+ 4.0, Cr 0.67, Glucose 146  Imaging: CT A/P no abdominal pathology, RLL opacification  Medications: azithromycin x 1, aztreonam x 1, vanc x 1    Pulmonary: Eulalio Allison [phone]  GI surgeon: Terrell Luong; [phone]  PCP: Anastasiya Jin [phone]   Cardiologist: Steven Leahy [phone]

## 2018-08-14 NOTE — ED PROVIDER NOTE - OBJECTIVE STATEMENT
67F PMH anal cancer s/p resection on 7/24, afib on eliquis, HTN, T2DM, asthma on chronic steroids p/w fever to 100.2 today. Pt was treated for UTI while admitted for surgery with 7d course of ceftriaxone. Pt was d/c to rehab and had fever today. Pt reports that she is having difficulty with her wound vac and now has a wet to dry dressing. Denies abd pain, n/v, some ostomy function noted, no chest pain cough or SOB.

## 2018-08-14 NOTE — H&P ADULT - PROBLEM SELECTOR PLAN 5
- diagnosed in 10/16 after BRBPR 8/16, dx with anal cancer SCC, initial PET scan showed no metastatic disease, received mitomycin/xeloda and RT 5/17 to 6/17. PET 1/18 showed hypermetabolism in anal area with rad onc concerned for local relapse/failure provied by biopsy 2/18  - underwent APR with Dr. Terrell Luong 7/24/18 with permanent end colostomy  - on exam no bleeding, purulence around wound however it is draining what appears to been mucus. Pt states during rehabilitation she was supposed to have wound vac on 3 times a week however was being placed twice a day because she was draining urine through the wound.   - CRC evaluated, PT wound care consult placed for replacement of wound vac  - miralax for constipation  - outpatient f/u with Dr. Zach King

## 2018-08-14 NOTE — ED PROVIDER NOTE - MEDICAL DECISION MAKING DETAILS
67F s/p anal cancer resection p/w fever concerning for post-op infection. Pt ntoed to have UTI during last hosptialization. Check UA CBC CMP CT CXR and reassess. No evidence of sepsis in the ED at this time

## 2018-08-15 DIAGNOSIS — K59.00 CONSTIPATION, UNSPECIFIED: ICD-10-CM

## 2018-08-15 DIAGNOSIS — J15.9 UNSPECIFIED BACTERIAL PNEUMONIA: ICD-10-CM

## 2018-08-15 DIAGNOSIS — J39.8 OTHER SPECIFIED DISEASES OF UPPER RESPIRATORY TRACT: ICD-10-CM

## 2018-08-15 DIAGNOSIS — E27.40 UNSPECIFIED ADRENOCORTICAL INSUFFICIENCY: ICD-10-CM

## 2018-08-15 DIAGNOSIS — Z93.3 COLOSTOMY STATUS: ICD-10-CM

## 2018-08-15 DIAGNOSIS — N30.00 ACUTE CYSTITIS WITHOUT HEMATURIA: ICD-10-CM

## 2018-08-15 DIAGNOSIS — J44.9 CHRONIC OBSTRUCTIVE PULMONARY DISEASE, UNSPECIFIED: ICD-10-CM

## 2018-08-15 LAB
ALBUMIN SERPL ELPH-MCNC: 3 G/DL — LOW (ref 3.3–5)
ALP SERPL-CCNC: 87 U/L — SIGNIFICANT CHANGE UP (ref 40–120)
ALT FLD-CCNC: 10 U/L — SIGNIFICANT CHANGE UP (ref 10–45)
ANION GAP SERPL CALC-SCNC: 12 MMOL/L — SIGNIFICANT CHANGE UP (ref 5–17)
AST SERPL-CCNC: 13 U/L — SIGNIFICANT CHANGE UP (ref 10–40)
BASOPHILS # BLD AUTO: 0.01 K/UL — SIGNIFICANT CHANGE UP (ref 0–0.2)
BASOPHILS NFR BLD AUTO: 0.1 % — SIGNIFICANT CHANGE UP (ref 0–2)
BILIRUB SERPL-MCNC: 0.2 MG/DL — SIGNIFICANT CHANGE UP (ref 0.2–1.2)
BUN SERPL-MCNC: 9 MG/DL — SIGNIFICANT CHANGE UP (ref 7–23)
CALCIUM SERPL-MCNC: 8.7 MG/DL — SIGNIFICANT CHANGE UP (ref 8.4–10.5)
CHLORIDE SERPL-SCNC: 99 MMOL/L — SIGNIFICANT CHANGE UP (ref 96–108)
CO2 SERPL-SCNC: 29 MMOL/L — SIGNIFICANT CHANGE UP (ref 22–31)
CREAT SERPL-MCNC: 0.67 MG/DL — SIGNIFICANT CHANGE UP (ref 0.5–1.3)
EOSINOPHIL # BLD AUTO: 0.48 K/UL — SIGNIFICANT CHANGE UP (ref 0–0.5)
EOSINOPHIL NFR BLD AUTO: 4.9 % — SIGNIFICANT CHANGE UP (ref 0–6)
GLUCOSE BLDC GLUCOMTR-MCNC: 167 MG/DL — HIGH (ref 70–99)
GLUCOSE BLDC GLUCOMTR-MCNC: 189 MG/DL — HIGH (ref 70–99)
GLUCOSE BLDC GLUCOMTR-MCNC: 279 MG/DL — HIGH (ref 70–99)
GLUCOSE BLDC GLUCOMTR-MCNC: 298 MG/DL — HIGH (ref 70–99)
GLUCOSE SERPL-MCNC: 157 MG/DL — HIGH (ref 70–99)
HCT VFR BLD CALC: 30.9 % — LOW (ref 34.5–45)
HGB BLD-MCNC: 9.4 G/DL — LOW (ref 11.5–15.5)
IMM GRANULOCYTES NFR BLD AUTO: 0.2 % — SIGNIFICANT CHANGE UP (ref 0–1.5)
INR BLD: 1.47 RATIO — HIGH (ref 0.88–1.16)
LEGIONELLA AG UR QL: NEGATIVE — SIGNIFICANT CHANGE UP
LYMPHOCYTES # BLD AUTO: 1.01 K/UL — SIGNIFICANT CHANGE UP (ref 1–3.3)
LYMPHOCYTES # BLD AUTO: 10.3 % — LOW (ref 13–44)
MCHC RBC-ENTMCNC: 22.7 PG — LOW (ref 27–34)
MCHC RBC-ENTMCNC: 30.4 GM/DL — LOW (ref 32–36)
MCV RBC AUTO: 74.5 FL — LOW (ref 80–100)
MONOCYTES # BLD AUTO: 0.87 K/UL — SIGNIFICANT CHANGE UP (ref 0–0.9)
MONOCYTES NFR BLD AUTO: 8.9 % — SIGNIFICANT CHANGE UP (ref 2–14)
NEUTROPHILS # BLD AUTO: 7.38 K/UL — SIGNIFICANT CHANGE UP (ref 1.8–7.4)
NEUTROPHILS NFR BLD AUTO: 75.6 % — SIGNIFICANT CHANGE UP (ref 43–77)
PLATELET # BLD AUTO: 432 K/UL — HIGH (ref 150–400)
POTASSIUM SERPL-MCNC: 3.9 MMOL/L — SIGNIFICANT CHANGE UP (ref 3.5–5.3)
POTASSIUM SERPL-SCNC: 3.9 MMOL/L — SIGNIFICANT CHANGE UP (ref 3.5–5.3)
PROT SERPL-MCNC: 6.5 G/DL — SIGNIFICANT CHANGE UP (ref 6–8.3)
PROTHROM AB SERPL-ACNC: 16.7 SEC — HIGH (ref 10–13.1)
RAPID RVP RESULT: SIGNIFICANT CHANGE UP
RBC # BLD: 4.15 M/UL — SIGNIFICANT CHANGE UP (ref 3.8–5.2)
RBC # FLD: 16.2 % — HIGH (ref 10.3–14.5)
SODIUM SERPL-SCNC: 140 MMOL/L — SIGNIFICANT CHANGE UP (ref 135–145)
WBC # BLD: 9.77 K/UL — SIGNIFICANT CHANGE UP (ref 3.8–10.5)
WBC # FLD AUTO: 9.77 K/UL — SIGNIFICANT CHANGE UP (ref 3.8–10.5)

## 2018-08-15 PROCEDURE — 99233 SBSQ HOSP IP/OBS HIGH 50: CPT

## 2018-08-15 PROCEDURE — 99223 1ST HOSP IP/OBS HIGH 75: CPT

## 2018-08-15 PROCEDURE — 99223 1ST HOSP IP/OBS HIGH 75: CPT | Mod: 24

## 2018-08-15 PROCEDURE — 71250 CT THORAX DX C-: CPT | Mod: 26

## 2018-08-15 RX ORDER — DOCUSATE SODIUM 100 MG
100 CAPSULE ORAL THREE TIMES A DAY
Qty: 0 | Refills: 0 | Status: DISCONTINUED | OUTPATIENT
Start: 2018-08-15 | End: 2018-08-23

## 2018-08-15 RX ORDER — ACETAMINOPHEN 500 MG
1000 TABLET ORAL ONCE
Qty: 0 | Refills: 0 | Status: COMPLETED | OUTPATIENT
Start: 2018-08-15 | End: 2018-08-15

## 2018-08-15 RX ORDER — ALBUTEROL 90 UG/1
1 AEROSOL, METERED ORAL EVERY 4 HOURS
Qty: 0 | Refills: 0 | Status: DISCONTINUED | OUTPATIENT
Start: 2018-08-15 | End: 2018-08-15

## 2018-08-15 RX ORDER — LACTULOSE 10 G/15ML
10 SOLUTION ORAL ONCE
Qty: 0 | Refills: 0 | Status: COMPLETED | OUTPATIENT
Start: 2018-08-15 | End: 2018-08-15

## 2018-08-15 RX ORDER — VANCOMYCIN HCL 1 G
1000 VIAL (EA) INTRAVENOUS ONCE
Qty: 0 | Refills: 0 | Status: COMPLETED | OUTPATIENT
Start: 2018-08-15 | End: 2018-08-15

## 2018-08-15 RX ORDER — TIOTROPIUM BROMIDE 18 UG/1
1 CAPSULE ORAL; RESPIRATORY (INHALATION) DAILY
Qty: 0 | Refills: 0 | Status: DISCONTINUED | OUTPATIENT
Start: 2018-08-15 | End: 2018-08-15

## 2018-08-15 RX ORDER — IPRATROPIUM/ALBUTEROL SULFATE 18-103MCG
3 AEROSOL WITH ADAPTER (GRAM) INHALATION EVERY 6 HOURS
Qty: 0 | Refills: 0 | Status: DISCONTINUED | OUTPATIENT
Start: 2018-08-15 | End: 2018-08-23

## 2018-08-15 RX ORDER — CHLORHEXIDINE GLUCONATE 213 G/1000ML
1 SOLUTION TOPICAL
Qty: 0 | Refills: 0 | Status: DISCONTINUED | OUTPATIENT
Start: 2018-08-15 | End: 2018-08-23

## 2018-08-15 RX ORDER — BUDESONIDE AND FORMOTEROL FUMARATE DIHYDRATE 160; 4.5 UG/1; UG/1
2 AEROSOL RESPIRATORY (INHALATION)
Qty: 0 | Refills: 0 | Status: DISCONTINUED | OUTPATIENT
Start: 2018-08-15 | End: 2018-08-23

## 2018-08-15 RX ORDER — VANCOMYCIN HCL 1 G
1000 VIAL (EA) INTRAVENOUS EVERY 12 HOURS
Qty: 0 | Refills: 0 | Status: DISCONTINUED | OUTPATIENT
Start: 2018-08-16 | End: 2018-08-18

## 2018-08-15 RX ORDER — SIMETHICONE 80 MG/1
80 TABLET, CHEWABLE ORAL ONCE
Qty: 0 | Refills: 0 | Status: COMPLETED | OUTPATIENT
Start: 2018-08-15 | End: 2018-08-15

## 2018-08-15 RX ORDER — INSULIN GLARGINE 100 [IU]/ML
8 INJECTION, SOLUTION SUBCUTANEOUS AT BEDTIME
Qty: 0 | Refills: 0 | Status: DISCONTINUED | OUTPATIENT
Start: 2018-08-15 | End: 2018-08-16

## 2018-08-15 RX ORDER — FLUCONAZOLE 150 MG/1
100 TABLET ORAL DAILY
Qty: 0 | Refills: 0 | Status: DISCONTINUED | OUTPATIENT
Start: 2018-08-15 | End: 2018-08-17

## 2018-08-15 RX ORDER — VANCOMYCIN HCL 1 G
1000 VIAL (EA) INTRAVENOUS EVERY 12 HOURS
Qty: 0 | Refills: 0 | Status: DISCONTINUED | OUTPATIENT
Start: 2018-08-15 | End: 2018-08-16

## 2018-08-15 RX ORDER — INSULIN GLARGINE 100 [IU]/ML
8 INJECTION, SOLUTION SUBCUTANEOUS AT BEDTIME
Qty: 0 | Refills: 0 | Status: DISCONTINUED | OUTPATIENT
Start: 2018-08-15 | End: 2018-08-15

## 2018-08-15 RX ORDER — CHOLECALCIFEROL (VITAMIN D3) 125 MCG
1 CAPSULE ORAL
Qty: 0 | Refills: 0 | COMMUNITY

## 2018-08-15 RX ORDER — DOCUSATE SODIUM 100 MG
100 CAPSULE ORAL DAILY
Qty: 0 | Refills: 0 | Status: DISCONTINUED | OUTPATIENT
Start: 2018-08-15 | End: 2018-08-15

## 2018-08-15 RX ORDER — SENNA PLUS 8.6 MG/1
2 TABLET ORAL AT BEDTIME
Qty: 0 | Refills: 0 | Status: DISCONTINUED | OUTPATIENT
Start: 2018-08-15 | End: 2018-08-23

## 2018-08-15 RX ORDER — VANCOMYCIN HCL 1 G
VIAL (EA) INTRAVENOUS
Qty: 0 | Refills: 0 | Status: DISCONTINUED | OUTPATIENT
Start: 2018-08-15 | End: 2018-08-18

## 2018-08-15 RX ORDER — INSULIN LISPRO 100/ML
3 VIAL (ML) SUBCUTANEOUS
Qty: 0 | Refills: 0 | Status: COMPLETED | OUTPATIENT
Start: 2018-08-15 | End: 2018-08-15

## 2018-08-15 RX ADMIN — Medication 100 MILLIGRAM(S): at 12:35

## 2018-08-15 RX ADMIN — INSULIN GLARGINE 8 UNIT(S): 100 INJECTION, SOLUTION SUBCUTANEOUS at 22:59

## 2018-08-15 RX ADMIN — OXYCODONE AND ACETAMINOPHEN 1 TABLET(S): 5; 325 TABLET ORAL at 21:29

## 2018-08-15 RX ADMIN — Medication 3 UNIT(S): at 17:37

## 2018-08-15 RX ADMIN — POLYETHYLENE GLYCOL 3350 17 GRAM(S): 17 POWDER, FOR SOLUTION ORAL at 10:48

## 2018-08-15 RX ADMIN — Medication 250 MILLIGRAM(S): at 21:04

## 2018-08-15 RX ADMIN — SIMETHICONE 80 MILLIGRAM(S): 80 TABLET, CHEWABLE ORAL at 08:26

## 2018-08-15 RX ADMIN — Medication 3: at 17:36

## 2018-08-15 RX ADMIN — ALBUTEROL 2 PUFF(S): 90 AEROSOL, METERED ORAL at 05:54

## 2018-08-15 RX ADMIN — Medication 400 MILLIGRAM(S): at 01:55

## 2018-08-15 RX ADMIN — SIMETHICONE 80 MILLIGRAM(S): 80 TABLET, CHEWABLE ORAL at 21:29

## 2018-08-15 RX ADMIN — Medication 3 MILLILITER(S): at 12:35

## 2018-08-15 RX ADMIN — OXYCODONE AND ACETAMINOPHEN 1 TABLET(S): 5; 325 TABLET ORAL at 07:44

## 2018-08-15 RX ADMIN — Medication 3 MILLILITER(S): at 18:35

## 2018-08-15 RX ADMIN — Medication 3: at 12:26

## 2018-08-15 RX ADMIN — LACTULOSE 10 GRAM(S): 10 SOLUTION ORAL at 15:53

## 2018-08-15 RX ADMIN — CEFEPIME 100 MILLIGRAM(S): 1 INJECTION, POWDER, FOR SOLUTION INTRAMUSCULAR; INTRAVENOUS at 23:15

## 2018-08-15 RX ADMIN — Medication 1: at 08:25

## 2018-08-15 RX ADMIN — MONTELUKAST 10 MILLIGRAM(S): 4 TABLET, CHEWABLE ORAL at 12:26

## 2018-08-15 RX ADMIN — Medication 0.25 MILLIGRAM(S): at 05:54

## 2018-08-15 RX ADMIN — OXYCODONE AND ACETAMINOPHEN 1 TABLET(S): 5; 325 TABLET ORAL at 15:52

## 2018-08-15 RX ADMIN — FLUCONAZOLE 100 MILLIGRAM(S): 150 TABLET ORAL at 21:12

## 2018-08-15 RX ADMIN — CEFEPIME 100 MILLIGRAM(S): 1 INJECTION, POWDER, FOR SOLUTION INTRAMUSCULAR; INTRAVENOUS at 09:08

## 2018-08-15 RX ADMIN — Medication 1000 MILLIGRAM(S): at 02:30

## 2018-08-15 RX ADMIN — APIXABAN 5 MILLIGRAM(S): 2.5 TABLET, FILM COATED ORAL at 21:05

## 2018-08-15 RX ADMIN — APIXABAN 5 MILLIGRAM(S): 2.5 TABLET, FILM COATED ORAL at 10:48

## 2018-08-15 RX ADMIN — OXYCODONE AND ACETAMINOPHEN 1 TABLET(S): 5; 325 TABLET ORAL at 08:44

## 2018-08-15 RX ADMIN — CEFEPIME 100 MILLIGRAM(S): 1 INJECTION, POWDER, FOR SOLUTION INTRAMUSCULAR; INTRAVENOUS at 16:46

## 2018-08-15 RX ADMIN — BUDESONIDE AND FORMOTEROL FUMARATE DIHYDRATE 2 PUFF(S): 160; 4.5 AEROSOL RESPIRATORY (INHALATION) at 18:51

## 2018-08-15 RX ADMIN — OXYCODONE AND ACETAMINOPHEN 1 TABLET(S): 5; 325 TABLET ORAL at 22:18

## 2018-08-15 RX ADMIN — Medication 3 MILLILITER(S): at 23:32

## 2018-08-15 RX ADMIN — PANTOPRAZOLE SODIUM 40 MILLIGRAM(S): 20 TABLET, DELAYED RELEASE ORAL at 07:44

## 2018-08-15 RX ADMIN — Medication 4 MILLIGRAM(S): at 05:54

## 2018-08-15 RX ADMIN — OXYCODONE AND ACETAMINOPHEN 1 TABLET(S): 5; 325 TABLET ORAL at 15:05

## 2018-08-15 NOTE — CONSULT NOTE ADULT - SUBJECTIVE AND OBJECTIVE BOX
Patient is a 67y old  Female who presents with a chief complaint of UTI (14 Aug 2018 21:17)      HPI:  The patient is a 66 yo woman with PMH of COPD, ashtma on chronic steroids, tracheobronchomalacia s/p bronchial thermoplasty (10/16), Afib on Eliquis, HTN, adrenal insufficiency, T2DM, DVT, SCC anus (dx 2016, s/p chemo rad s/p 18) presenting from rehabiliation with fevers. The patient stated she felt subjective fevers yesterday to T max 100.2 this morning, she was informed given she is on steroids T > 99 would be fever, prompting her to come to the ED. She is denying any chills URI symptoms of rhinorrhea, sore throat, dysphagia. She has cough that is sometimes productive with greenish phlegm however she has had it for several years. She feels increased SOB non-exertional from decreased inspiratory effort. During patient's previous admission end of 2018 she developed fevers with positive urine culture. She was treated with 7 day course of ceftriaxone. She is reporting continued dysuria, increased frequency, incontinence. The patient ostomy has not emptied since she was in rehabilitation, she is citing functiong during previous admission prior to rehabilitation. She is denying any sick contacts.    In the ED:  Vitals: /72, HR 80, RR 20, T 98.6  Labs: WBC 12.0 , Hgb 9.6, Platelet 482, Na 141, k+ 4.0, Cr 0.67, Glucose 146  Imaging: CT A/P no abdominal pathology, RLL opacification  Medications: azithromycin x 1, aztreonam x 1, vanc x 1    Pulmonary: Eulalio Allison [phone]  GI surgeon: Terrell Luong; [phone]  PCP: Anastasiya Jin [phone]   Cardiologist: Steven Leahy [phone] (14 Aug 2018 20:29)      REVIEW OF SYSTEMS:    CONSTITUTIONAL: No fever, weight loss, or fatigue  EYES: No eye pain, visual disturbances, or discharge  ENMT:  No sore throat  NECK: No pain or stiffness  RESPIRATORY: No cough, wheezing, chills or hemoptysis; No shortness of breath  CARDIOVASCULAR: No chest pain, palpitations, dizziness, or leg swelling  GASTROINTESTINAL: No abdominal or epigastric pain. No nausea, vomiting, or hematemesis; No diarrhea or constipation. No melena or hematochezia.  GENITOURINARY: No dysuria, frequency, hematuria, or incontinence  NEUROLOGICAL: No headaches, memory loss, loss of strength, numbness, or tremors  SKIN: No itching, burning, rashes, or lesions   LYMPH NODES: No enlarged glands  MUSCULOSKELETAL: No joint pain or swelling; No muscle, back, or extremity pain      PAST MEDICAL & SURGICAL HISTORY:  TIA (transient ischemic attack): multiple, last 5 years ago - presents as right-sided weakness  DVT (deep venous thrombosis): 15-20 years ago, took coumadin  Seizure: x 1 18  Rectal bleeding  Colorectal cancer: 2018- last treatment , chemo and radiation  Tracheobronchomalacia: diagnosed , s/p bronchial thermoplasty  (Dr Zapien); recent bronchoscopy 2018 revealed no evidence of tracheobronchomalacia in trachea or bronchial tubes  Asthma  Pelvic fracture  Aortic insufficiency: moderate AR on echo 5/3/2018  Adrenal insufficiency: Medrol daily for over 50 years  COPD (chronic obstructive pulmonary disease)  Diabetes: Type 2  Atrial fibrillation: paroxysmal, on eliquis  Rectal bleeding: exam under anesthesia ([facility]) 2018  S/P bronchoscopy: 2018 - [facility] (Dr Zapien) no evidence of tracheobronchomalacia in trachea or bronchial tubes  History of tracheomalacia:  - attempted tracheal stenting ([facility])- course complicated by obstruction, respiratory failure, multiple CPR attempts -  stent discontinued; 10/20/2016 Tracheobronchoplasty (Prolene Mesh) performed at [facility] by Dr Zapien  H/O pelvic surgery: 5 years ago - s/p fracture  Exostosis of orbit, 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  History of sinus surgery: multiple sinus surgeries  H/O total knee replacement, bilateral: 5 years ago  History of partial hysterectomy: 30 years ago - fibroids      Allergies    ampicillin (Short breath)  aspirin (Short breath)  Avelox (Short breath; Pruritus)  codeine (Short breath)  Dilaudid (Short breath)  iodine (Short breath; Swelling)  penicillin (Short breath)  shellfish (Anaphylaxis)  tetanus toxoid (Short breath)  Valium (Short breath)    Intolerances        FAMILY HISTORY:  Family history of diabetes mellitus type II  Family history of breast cancer (Sibling)  Family history of asthma      SOCIAL HISTORY:        MEDICATIONS  (STANDING):  ALBUTerol/ipratropium for Nebulization 3 milliLiter(s) Nebulizer every 6 hours  apixaban 5 milliGRAM(s) Oral every 12 hours  buDESOnide 160 MICROgram(s)/formoterol 4.5 MICROgram(s) Inhaler 2 Puff(s) Inhalation two times a day  cefepime   IVPB 2000 milliGRAM(s) IV Intermittent every 8 hours  chlorhexidine 4% Liquid 1 Application(s) Topical <User Schedule>  dextrose 5%. 1000 milliLiter(s) (50 mL/Hr) IV Continuous <Continuous>  dextrose 50% Injectable 12.5 Gram(s) IV Push once  dextrose 50% Injectable 25 Gram(s) IV Push once  dextrose 50% Injectable 25 Gram(s) IV Push once  digoxin     Tablet 0.25 milliGRAM(s) Oral daily  docusate sodium 100 milliGRAM(s) Oral daily  insulin glargine Injectable (LANTUS) 8 Unit(s) SubCutaneous at bedtime  insulin lispro (HumaLOG) corrective regimen sliding scale   SubCutaneous three times a day before meals  insulin lispro (HumaLOG) corrective regimen sliding scale   SubCutaneous at bedtime  methylPREDNISolone 4 milliGRAM(s) Oral daily  montelukast 10 milliGRAM(s) Oral daily  pantoprazole    Tablet 40 milliGRAM(s) Oral before breakfast  polyethylene glycol 3350 17 Gram(s) Oral every 12 hours  senna 2 Tablet(s) Oral at bedtime    MEDICATIONS  (PRN):  acetaminophen   Tablet 650 milliGRAM(s) Oral every 6 hours PRN Moderate Pain (4 - 6)  dextrose 40% Gel 15 Gram(s) Oral once PRN Blood Glucose LESS THAN 70 milliGRAM(s)/deciliter  glucagon  Injectable 1 milliGRAM(s) IntraMuscular once PRN Glucose LESS THAN 70 milligrams/deciliter  oxyCODONE    5 mG/acetaminophen 325 mG 1 Tablet(s) Oral every 6 hours PRN Severe Pain (7 - 10)      Vital Signs Last 24 Hrs  T(C): 36.9 (15 Aug 2018 12:45), Max: 37 (14 Aug 2018 17:00)  T(F): 98.5 (15 Aug 2018 12:45), Max: 98.6 (14 Aug 2018 17:00)  HR: 83 (15 Aug 2018 12:45) (69 - 83)  BP: 126/68 (15 Aug 2018 12:45) (114/68 - 134/67)  BP(mean): --  RR: 18 (15 Aug 2018 12:45) (16 - 18)  SpO2: 94% (15 Aug 2018 12:45) (93% - 94%)    PHYSICAL EXAM:    GENERAL: NAD, well-groomed  HEAD:  Atraumatic, Normocephalic  EYES: EOMI, PERRLA, conjunctiva and sclera clear  ENMT: No tonsillar erythema, exudates, or enlargement; Moist mucous membranes  NECK: Supple, No JVD  CHEST/LUNG: Clear to percussion bilaterally; No rales, rhonchi, wheezing, or rubs  HEART: Regular rate and rhythm; No murmurs, rubs, or gallops  ABDOMEN: Soft, Nontender, Nondistended; Bowel sounds present  EXTREMITIES:  2+ Peripheral Pulses, No clubbing, cyanosis, or edema  LYMPH: No lymphadenopathy noted  SKIN: No rashes or lesions    LABS:  CBC Full  -  ( 15 Aug 2018 07:28 )  WBC Count : 9.77 K/uL  Hemoglobin : 9.4 g/dL  Hematocrit : 30.9 %  Platelet Count - Automated : 432 K/uL  Mean Cell Volume : 74.5 fl  Mean Cell Hemoglobin : 22.7 pg  Mean Cell Hemoglobin Concentration : 30.4 gm/dL  Auto Neutrophil # : 7.38 K/uL  Auto Lymphocyte # : 1.01 K/uL  Auto Monocyte # : 0.87 K/uL  Auto Eosinophil # : 0.48 K/uL  Auto Basophil # : 0.01 K/uL  Auto Neutrophil % : 75.6 %  Auto Lymphocyte % : 10.3 %  Auto Monocyte % : 8.9 %  Auto Eosinophil % : 4.9 %  Auto Basophil % : 0.1 %      08-15    140  |  99  |  9   ----------------------------<  157<H>  3.9   |  29  |  0.67    Ca    8.7      15 Aug 2018 06:38    TPro  6.5  /  Alb  3.0<L>  /  TBili  0.2  /  DBili  x   /  AST  13  /  ALT  10  /  AlkPhos  87  08-15      LIVER FUNCTIONS - ( 15 Aug 2018 06:38 )  Alb: 3.0 g/dL / Pro: 6.5 g/dL / ALK PHOS: 87 U/L / ALT: 10 U/L / AST: 13 U/L / GGT: x                               MICROBIOLOGY:        Urinalysis Basic - ( 14 Aug 2018 14:05 )    Color: Orange / Appearance: SL Turbid / S.008 / pH: x  Gluc: x / Ketone: Negative  / Bili: Negative / Urobili: Negative   Blood: x / Protein: Trace / Nitrite: Positive   Leuk Esterase: Large / RBC: 0-2 /HPF / WBC >50 /HPF   Sq Epi: x / Non Sq Epi: Occasional /HPF / Bacteria: Few /HPF        Culture - Urine (18 @ 21:05)    Specimen Source: .Urine Clean Catch (Midstream)    Culture Results:   Normal Urogenital val present    Culture - Blood (18 @ 01:04)    Specimen Source: .Blood Blood    Culture Results:   No growth at 5 days.    Culture - Sputum . (18 @ 22:14)    -  Imipenem: S <=1    -  Meropenem: S <=1    -  Piperacillin/Tazobactam: S <=8    -  Tobramycin: S <=2    -  Gentamicin: S 4    -  Levofloxacin: S <=1    Gram Stain:   Moderate polymorphonuclear leukocytes per low power field  Rare Squamous epithelial cells per low power field  Moderate Gram Negative Rods per oil power field    -  Amikacin: S <=8    -  Aztreonam: S 8    -  Cefepime: S <=2    -  Ciprofloxacin: S <=0.5    -  Ceftazidime: S 4    Specimen Source: .Sputum Sputum    Culture Results:   Moderate Pseudomonas aeruginosa  Normal Respiratory Val present    Organism Identification: Pseudomonas aeruginosa    Organism: Pseudomonas aeruginosa    Method Type: GARRETT      Culture - Blood (18 @ 00:45)    Specimen Source: .Blood Blood-Venous    Culture Results:   No growth at 5 days.    Rapid Respiratory Viral Panel (08.15.18 @ 02:19)    Rapid RVP Result: NotDetec: The FilmArray RVP Rapid uses polymerase chain reaction (PCR) and melt  curve analysis to screen for adenovirus; coronavirus HKU1, NL63, 229E,  OC43; human metapneumovirus (hMPV); human enterovirus/rhinovirus  (Entero/RV); influenza A; influenza A/H1;influenza A/H3; influenza  A/H1-2009; influenza B; parainfluenza viruses 1, 2, 3, 4; respiratory  syncytial virus; Bordetella pertussis; Mycoplasma pneumoniae; and  Chlamydophila pneumoniae.          RADIOLOGY:      < from: Xray Chest 1 View- PORTABLE-Urgent (18 @ 14:30) >  INTERPRETATION:  CLINICAL INFORMATION: Fever.    A single portable view of the chest was obtained.     Comparison: 2018.     The mediastinal cardiac silhouette is unremarkable. Right Mediport   unchanged.    The lungs are clear.     No acute osseous finding.     IMPRESSION:    No acute process.    < end of copied text >        < from: CT Abdomen and Pelvis No Cont (18 @ 14:08) >  IMPRESSION:     Worsening opacity in the right lower lobe consistent with pneumonia.    No acute intra-abdominal pathology or collection.    < end of copied text > Patient is a 67y old  Female who presents with a chief complaint of UTI (14 Aug 2018 21:17)      HPI:  The patient is a 68 yo woman with PMH of COPD, ashtma on chronic steroids, tracheobronchomalacia s/p bronchial thermoplasty (10/16), Afib on Eliquis, HTN, adrenal insufficiency, T2DM, DVT, SCC anus (dx 2016, s/p chemo rad s/p 18) presenting from rehabiliation with fevers. The patient stated she felt subjective fevers yesterday to T max 100.2 this morning, she was informed given she is on steroids T > 99 would be fever, prompting her to come to the ED. She is denying any chills URI symptoms of rhinorrhea, sore throat, dysphagia. She has cough that is sometimes productive with greenish phlegm however she has had it for several years. She feels increased SOB non-exertional from decreased inspiratory effort. During patient's previous admission end of 2018 she developed fevers with positive urine culture. She was treated with 7 day course of ceftriaxone. She is reporting continued dysuria, increased frequency, incontinence. The patient ostomy has not emptied since she was in rehabilitation, she is citing functioing during previous admission prior to rehabilitation. She is denying any sick contacts.    Pt states she has an open draining wound in the perineum, for which she had a wound vac that is now off.  Drainage is malodorous.      In the ED:  Vitals: /72, HR 80, RR 20, T 98.6  Labs: WBC 12.0 , Hgb 9.6, Platelet 482, Na 141, k+ 4.0, Cr 0.67, Glucose 146  Imaging: CT A/P no abdominal pathology, RLL opacification  Medications: azithromycin x 1, aztreonam x 1, vanc x 1    Pulmonary: Eulalio Allison [phone]  GI surgeon: Terrell Luong; [phone]  PCP: Anastasiya Jin [phone]   Cardiologist: Steven Leahy [phone] (14 Aug 2018 20:29)      REVIEW OF SYSTEMS:    CONSTITUTIONAL: (+) fever  EYES: No eye pain, visual disturbances, or discharge  ENMT:  No sore throat  NECK: No pain or stiffness  RESPIRATORY: No cough, wheezing, chills or hemoptysis; No shortness of breath  CARDIOVASCULAR: No chest pain, palpitations, dizziness, or leg swelling  GASTROINTESTINAL: No abdominal or epigastric pain. No nausea, vomiting, or hematemesis; No diarrhea or constipation. No melena or hematochezia.  Ostomy with minimal output  GENITOURINARY: No dysuria, frequency, hematuria, or incontinence  NEUROLOGICAL: No headaches, memory loss, loss of strength, numbness, or tremors  SKIN: No itching, burning, rashes, or lesions   LYMPH NODES: No enlarged glands  MUSCULOSKELETAL: No joint pain or swelling; No muscle, back, or extremity pain      PAST MEDICAL & SURGICAL HISTORY:  TIA (transient ischemic attack): multiple, last 5 years ago - presents as right-sided weakness  DVT (deep venous thrombosis): 15-20 years ago, took coumadin  Seizure: x 1 18  Rectal bleeding  Colorectal cancer: 2018- last treatment , chemo and radiation  Tracheobronchomalacia: diagnosed , s/p bronchial thermoplasty  (Dr Zapien); recent bronchoscopy 2018 revealed no evidence of tracheobronchomalacia in trachea or bronchial tubes  Asthma  Pelvic fracture  Aortic insufficiency: moderate AR on echo 5/3/2018  Adrenal insufficiency: Medrol daily for over 50 years  COPD (chronic obstructive pulmonary disease)  Diabetes: Type 2  Atrial fibrillation: paroxysmal, on eliquis  Rectal bleeding: exam under anesthesia ([facility]) 2018  S/P bronchoscopy: 2018 - [facility] (Dr Zapien) no evidence of tracheobronchomalacia in trachea or bronchial tubes  History of tracheomalacia:  - attempted tracheal stenting ([facility])- course complicated by obstruction, respiratory failure, multiple CPR attempts -  stent discontinued; 10/20/2016 Tracheobronchoplasty (Prolene Mesh) performed at [facility] by Dr Zapien  H/O pelvic surgery: 5 years ago - s/p fracture  Exostosis of orbit, 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  History of sinus surgery: multiple sinus surgeries  H/O total knee replacement, bilateral: 5 years ago  History of partial hysterectomy: 30 years ago - fibroids      Allergies    ampicillin (Short breath)  aspirin (Short breath)  Avelox (Short breath; Pruritus)  codeine (Short breath)  Dilaudid (Short breath)  iodine (Short breath; Swelling)  penicillin (Short breath)  shellfish (Anaphylaxis)  tetanus toxoid (Short breath)  Valium (Short breath)    Intolerances        FAMILY HISTORY:  Family history of diabetes mellitus type II  Family history of breast cancer (Sibling)  Family history of asthma      SOCIAL HISTORY:  No ivdu, etoh, smoking      MEDICATIONS  (STANDING):  ALBUTerol/ipratropium for Nebulization 3 milliLiter(s) Nebulizer every 6 hours  apixaban 5 milliGRAM(s) Oral every 12 hours  buDESOnide 160 MICROgram(s)/formoterol 4.5 MICROgram(s) Inhaler 2 Puff(s) Inhalation two times a day  cefepime   IVPB 2000 milliGRAM(s) IV Intermittent every 8 hours  chlorhexidine 4% Liquid 1 Application(s) Topical <User Schedule>  dextrose 5%. 1000 milliLiter(s) (50 mL/Hr) IV Continuous <Continuous>  dextrose 50% Injectable 12.5 Gram(s) IV Push once  dextrose 50% Injectable 25 Gram(s) IV Push once  dextrose 50% Injectable 25 Gram(s) IV Push once  digoxin     Tablet 0.25 milliGRAM(s) Oral daily  docusate sodium 100 milliGRAM(s) Oral daily  insulin glargine Injectable (LANTUS) 8 Unit(s) SubCutaneous at bedtime  insulin lispro (HumaLOG) corrective regimen sliding scale   SubCutaneous three times a day before meals  insulin lispro (HumaLOG) corrective regimen sliding scale   SubCutaneous at bedtime  methylPREDNISolone 4 milliGRAM(s) Oral daily  montelukast 10 milliGRAM(s) Oral daily  pantoprazole    Tablet 40 milliGRAM(s) Oral before breakfast  polyethylene glycol 3350 17 Gram(s) Oral every 12 hours  senna 2 Tablet(s) Oral at bedtime    MEDICATIONS  (PRN):  acetaminophen   Tablet 650 milliGRAM(s) Oral every 6 hours PRN Moderate Pain (4 - 6)  dextrose 40% Gel 15 Gram(s) Oral once PRN Blood Glucose LESS THAN 70 milliGRAM(s)/deciliter  glucagon  Injectable 1 milliGRAM(s) IntraMuscular once PRN Glucose LESS THAN 70 milligrams/deciliter  oxyCODONE    5 mG/acetaminophen 325 mG 1 Tablet(s) Oral every 6 hours PRN Severe Pain (7 - 10)      Vital Signs Last 24 Hrs  T(C): 36.9 (15 Aug 2018 12:45), Max: 37 (14 Aug 2018 17:00)  T(F): 98.5 (15 Aug 2018 12:45), Max: 98.6 (14 Aug 2018 17:00)  HR: 83 (15 Aug 2018 12:45) (69 - 83)  BP: 126/68 (15 Aug 2018 12:45) (114/68 - 134/67)  BP(mean): --  RR: 18 (15 Aug 2018 12:45) (16 - 18)  SpO2: 94% (15 Aug 2018 12:45) (93% - 94%)    PHYSICAL EXAM:    GENERAL: NAD, well-groomed  HEAD:  Atraumatic, Normocephalic  EYES: EOMI, PERRLA, conjunctiva and sclera clear  ENMT: No tonsillar erythema, exudates, or enlargement; Moist mucous membranes  NECK: Supple, No JVD  CHEST/LUNG: Clear to percussion bilaterally; No rales, rhonchi, wheezing, or rubs  HEART: Regular rate and rhythm; No murmurs, rubs, or gallops  ABDOMEN: Soft, Nontender, Nondistended; Bowel sounds present, ostomy with minimal output  EXTREMITIES:  2+ Peripheral Pulses, No clubbing, cyanosis, or edema  LYMPH: No lymphadenopathy noted  SKIN: perineum with open wound, mild drainage with malodor    LABS:  CBC Full  -  ( 15 Aug 2018 07:28 )  WBC Count : 9.77 K/uL  Hemoglobin : 9.4 g/dL  Hematocrit : 30.9 %  Platelet Count - Automated : 432 K/uL  Mean Cell Volume : 74.5 fl  Mean Cell Hemoglobin : 22.7 pg  Mean Cell Hemoglobin Concentration : 30.4 gm/dL  Auto Neutrophil # : 7.38 K/uL  Auto Lymphocyte # : 1.01 K/uL  Auto Monocyte # : 0.87 K/uL  Auto Eosinophil # : 0.48 K/uL  Auto Basophil # : 0.01 K/uL  Auto Neutrophil % : 75.6 %  Auto Lymphocyte % : 10.3 %  Auto Monocyte % : 8.9 %  Auto Eosinophil % : 4.9 %  Auto Basophil % : 0.1 %      08-15    140  |  99  |  9   ----------------------------<  157<H>  3.9   |  29  |  0.67    Ca    8.7      15 Aug 2018 06:38    TPro  6.5  /  Alb  3.0<L>  /  TBili  0.2  /  DBili  x   /  AST  13  /  ALT  10  /  AlkPhos  87  0815      LIVER FUNCTIONS - ( 15 Aug 2018 06:38 )  Alb: 3.0 g/dL / Pro: 6.5 g/dL / ALK PHOS: 87 U/L / ALT: 10 U/L / AST: 13 U/L / GGT: x                               MICROBIOLOGY:        Urinalysis Basic - ( 14 Aug 2018 14:05 )    Color: Orange / Appearance: SL Turbid / S.008 / pH: x  Gluc: x / Ketone: Negative  / Bili: Negative / Urobili: Negative   Blood: x / Protein: Trace / Nitrite: Positive   Leuk Esterase: Large / RBC: 0-2 /HPF / WBC >50 /HPF   Sq Epi: x / Non Sq Epi: Occasional /HPF / Bacteria: Few /HPF        Culture - Urine (18 @ 21:05)    Specimen Source: .Urine Clean Catch (Midstream)    Culture Results:   Normal Urogenital val present    Culture - Blood (18 @ 01:04)    Specimen Source: .Blood Blood    Culture Results:   No growth at 5 days.    Culture - Sputum . (18 @ 22:14)    -  Imipenem: S <=1    -  Meropenem: S <=1    -  Piperacillin/Tazobactam: S <=8    -  Tobramycin: S <=2    -  Gentamicin: S 4    -  Levofloxacin: S <=1    Gram Stain:   Moderate polymorphonuclear leukocytes per low power field  Rare Squamous epithelial cells per low power field  Moderate Gram Negative Rods per oil power field    -  Amikacin: S <=8    -  Aztreonam: S 8    -  Cefepime: S <=2    -  Ciprofloxacin: S <=0.5    -  Ceftazidime: S 4    Specimen Source: .Sputum Sputum    Culture Results:   Moderate Pseudomonas aeruginosa  Normal Respiratory Val present    Organism Identification: Pseudomonas aeruginosa    Organism: Pseudomonas aeruginosa    Method Type: GARRETT      Culture - Blood (18 @ 00:45)    Specimen Source: .Blood Blood-Venous    Culture Results:   No growth at 5 days.    Rapid Respiratory Viral Panel (08.15.18 @ 02:19)    Rapid RVP Result: NotDetec: The FilmArray RVP Rapid uses polymerase chain reaction (PCR) and melt  curve analysis to screen for adenovirus; coronavirus HKU1, NL63, 229E,  OC43; human metapneumovirus (hMPV); human enterovirus/rhinovirus  (Entero/RV); influenza A; influenza A/H1;influenza A/H3; influenza  A/H1-2009; influenza B; parainfluenza viruses 1, 2, 3, 4; respiratory  syncytial virus; Bordetella pertussis; Mycoplasma pneumoniae; and  Chlamydophila pneumoniae.          RADIOLOGY:      < from: Xray Chest 1 View- PORTABLE-Urgent (18 @ 14:30) >  INTERPRETATION:  CLINICAL INFORMATION: Fever.    A single portable view of the chest was obtained.     Comparison: 2018.     The mediastinal cardiac silhouette is unremarkable. Right Mediport   unchanged.    The lungs are clear.     No acute osseous finding.     IMPRESSION:    No acute process.    < end of copied text >        < from: CT Abdomen and Pelvis No Cont (18 @ 14:08) >  IMPRESSION:     Worsening opacity in the right lower lobe consistent with pneumonia.    No acute intra-abdominal pathology or collection.    < end of copied text >

## 2018-08-15 NOTE — CONSULT NOTE ADULT - PROBLEM SELECTOR RECOMMENDATION 6
-continue Medrol 4mg QD  -monitor BP and may need to consider stress dose steroids if she becomes hypotensive or septic

## 2018-08-15 NOTE — CHART NOTE - NSCHARTNOTEFT_GEN_A_CORE
D/w CRS Red Team (#[id number]) re: pt constipated ~5 days despite bowel regimen (lactulose , colace, miralax x2, senna), and awaiting PT/Wound Care eval for wound VAC for rectal wound . CRS Dr. Isauro Castillo examined ostomy, no impaction noted, gas noted in pouch. Recs: continue bowel regimen, and wound care to rectal wound as follows pending PT/Wound Care/VAC; saline wet to dry with gauze BID, change prn.  -D/w pt and RADHA Tate, [facility] [id number]

## 2018-08-15 NOTE — CONSULT NOTE ADULT - SUBJECTIVE AND OBJECTIVE BOX
HPI:  67 year old woman with COPD, Ashtma on chronic steroids, tracheobronchomalacia s/p bronchial thermoplasty (10/16), Afib on Eliquis, HTN, adrenal insufficiency, T2DM, DVT, SCC anus (dx , s/p chemo rad s/p APR 18) presenting from rehabiliation with fevers. The patient stated she felt subjective fevers yesterday to T max 100.2 this morning, she was informed given she is on steroids T > 99 would be fever, prompting her to come to the ED. She is denying any chills URI symptoms of rhinorrhea, sore throat, dysphagia. She has cough that is sometimes productive with greenish phlegm however she has had it for several years. She feels increased SOB non-exertional from decreased inspiratory effort. During patient's previous admission end of 2018 she developed fevers with positive urine culture. She was treated with 7 day course of ceftriaxone. She is reporting continued dysuria, increased frequency, incontinence. The patient ostomy has not emptied since she was in rehabilitation, she is citing functiong during previous admission prior to rehabilitation. She is denying any sick contacts.    In the ED:  Vitals: /72, HR 80, RR 20, T 98.6  Labs: WBC 12.0 , Hgb 9.6, Platelet 482, Na 141, k+ 4.0, Cr 0.67, Glucose 146  Imaging: CT A/P no abdominal pathology, RLL opacification  Medications: azithromycin x 1, aztreonam x 1, vanc x 1    Pulmonary: Eulalio Allison [phone]  GI surgeon: Terrell Luong; [phone]  PCP: Anastasiya Jin [phone]   Cardiologist: Steven Leahy [phone] (14 Aug 2018 20:29)      PAST MEDICAL & SURGICAL HISTORY:  TIA (transient ischemic attack): multiple, last 5 years ago - presents as right-sided weakness  DVT (deep venous thrombosis): 15-20 years ago, took coumadin  Seizure: x 1 18  Rectal bleeding  Colorectal cancer: 2018- last treatment , chemo and radiation  Tracheobronchomalacia: diagnosed , s/p bronchial thermoplasty  (Dr Zapien); recent bronchoscopy 2018 revealed no evidence of tracheobronchomalacia in trachea or bronchial tubes  Asthma  Pelvic fracture  Aortic insufficiency: moderate AR on echo 5/3/2018  Adrenal insufficiency: Medrol daily for over 50 years  COPD (chronic obstructive pulmonary disease)  Diabetes: Type 2  Atrial fibrillation: paroxysmal, on eliquis  Rectal bleeding: exam under anesthesia ([facility]) 2018  S/P bronchoscopy: 2018 - [facility] (Dr Zapien) no evidence of tracheobronchomalacia in trachea or bronchial tubes  History of tracheomalacia:  - attempted tracheal stenting ([facility])- course complicated by obstruction, respiratory failure, multiple CPR attempts -  stent discontinued; 10/20/2016 Tracheobronchoplasty (Prolene Mesh) performed at [facility] by Dr Zapien  H/O pelvic surgery: 5 years ago - s/p fracture  Exostosis of orbit, 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  History of sinus surgery: multiple sinus surgeries  H/O total knee replacement, bilateral: 5 years ago  History of partial hysterectomy: 30 years ago - fibroids      Allergies    ampicillin (Short breath)  aspirin (Short breath)  Avelox (Short breath; Pruritus)  codeine (Short breath)  Dilaudid (Short breath)  iodine (Short breath; Swelling)  penicillin (Short breath)  shellfish (Anaphylaxis)  tetanus toxoid (Short breath)  Valium (Short breath)    Intolerances        ANTIMICROBIALS:  azithromycin  IVPB 500 every 24 hours  cefepime   IVPB 2000 every 8 hours      OTHER MEDS:  acetaminophen   Tablet 650 milliGRAM(s) Oral every 6 hours PRN  ALBUTerol    90 MICROgram(s) HFA Inhaler 1 Puff(s) Inhalation every 4 hours  ALBUTerol/ipratropium for Nebulization 3 milliLiter(s) Nebulizer every 6 hours  apixaban 5 milliGRAM(s) Oral every 12 hours  buDESOnide 160 MICROgram(s)/formoterol 4.5 MICROgram(s) Inhaler 2 Puff(s) Inhalation two times a day  chlorhexidine 4% Liquid 1 Application(s) Topical <User Schedule>  dextrose 40% Gel 15 Gram(s) Oral once PRN  dextrose 5%. 1000 milliLiter(s) IV Continuous <Continuous>  dextrose 50% Injectable 12.5 Gram(s) IV Push once  dextrose 50% Injectable 25 Gram(s) IV Push once  dextrose 50% Injectable 25 Gram(s) IV Push once  digoxin     Tablet 0.25 milliGRAM(s) Oral daily  glucagon  Injectable 1 milliGRAM(s) IntraMuscular once PRN  insulin lispro (HumaLOG) corrective regimen sliding scale   SubCutaneous three times a day before meals  insulin lispro (HumaLOG) corrective regimen sliding scale   SubCutaneous at bedtime  methylPREDNISolone 4 milliGRAM(s) Oral daily  montelukast 10 milliGRAM(s) Oral daily  oxyCODONE    5 mG/acetaminophen 325 mG 1 Tablet(s) Oral every 6 hours PRN  pantoprazole    Tablet 40 milliGRAM(s) Oral before breakfast  polyethylene glycol 3350 17 Gram(s) Oral every 12 hours  tiotropium 18 MICROgram(s) Capsule 1 Capsule(s) Inhalation daily      SOCIAL HISTORY:    FAMILY HISTORY:  Family history of diabetes mellitus type II  Family history of breast cancer (Sibling)  Family history of asthma      Drug Dosing Weight  Height (cm): 170.18 (14 Aug 2018 20:02)  Weight (kg): 63.2 (14 Aug 2018 20:02)  BMI (kg/m2): 21.8 (14 Aug 2018 20:02)  BSA (m2): 1.73 (14 Aug 2018 20:02)    PE:    Vital Signs Last 24 Hrs  T(C): 36.7 (15 Aug 2018 04:25), Max: 37 (14 Aug 2018 12:18)  T(F): 98.1 (15 Aug 2018 04:25), Max: 98.6 (14 Aug 2018 12:18)  HR: 69 (15 Aug 2018 04:25) (69 - 82)  BP: 114/68 (15 Aug 2018 04:25) (114/68 - 134/67)  BP(mean): --  RR: 18 (15 Aug 2018 04:25) (16 - 20)  SpO2: 93% (15 Aug 2018 04:25) (93% - 95%)        LABS:                          9.4    9.77  )-----------( 432      ( 15 Aug 2018 07:28 )             30.9       08-15    140  |  99  |  9   ----------------------------<  157<H>  3.9   |  29  |  0.67    Ca    8.7      15 Aug 2018 06:38    TPro  6.5  /  Alb  3.0<L>  /  TBili  0.2  /  DBili  x   /  AST  13  /  ALT  10  /  AlkPhos  87  08-15      Urinalysis Basic - ( 14 Aug 2018 14:05 )    Color: Orange / Appearance: SL Turbid / S.008 / pH: x  Gluc: x / Ketone: Negative  / Bili: Negative / Urobili: Negative   Blood: x / Protein: Trace / Nitrite: Positive   Leuk Esterase: Large / RBC: 0-2 /HPF / WBC >50 /HPF   Sq Epi: x / Non Sq Epi: Occasional /HPF / Bacteria: Few /HPF        MICROBIOLOGY:  v  .Urine Clean Catch (Midstream)  18   Normal Urogenital val present  --  --      .Blood Blood  18   No growth at 5 days.  --  --      .Sputum Sputum  18   Moderate Pseudomonas aeruginosa  Normal Respiratory Val present  --  Pseudomonas aeruginosa      .Blood Blood-Venous  18   No growth at 5 days.  --  --      .Blood Blood-Venous  18   No growth at 5 days.  --  --      .Blood Blood-Peripheral  18   No growth at 5 days.  --  --      .Blood Blood  18   No growth at 5 days.  --  --          Rapid RVP Result: NotDetec (08-15 @ 02:19)    RADIOLOGY:    < from: Xray Chest 1 View- PORTABLE-Urgent (18 @ 14:30) >  IMPRESSION:    No acute process.      < end of copied text >    < from: CT Abdomen and Pelvis No Cont (18 @ 14:08) >  IMPRESSION:     Worsening opacity in the right lower lobe consistent with pneumonia.    No acute intra-abdominal pathology or collection.    < end of copied text >

## 2018-08-15 NOTE — CONSULT NOTE ADULT - PROBLEM SELECTOR RECOMMENDATION 8
-Colorectal surgery evaluation - as patient has not had ostomy output for 3-4 days  -wound care evaluation

## 2018-08-15 NOTE — CONSULT NOTE ADULT - ATTENDING COMMENTS
Pt admitted for fevers and being treated for UTI. She reports no abd pain, just pressure at the rectal wound. She has been eating but noticed little to no colostomy output for the past 9 days. She does report gas from the colostomy daily.     AAOx3  abd soft, NT/ND, colsotomy pink, viable with air in bag    S/P APR now with readmission for UTI  wound care RNs to assess for VAC replacement for perineal wound  abx per primary team for UTI  miralax for constipation  will follow
Dr. Allison to follow

## 2018-08-15 NOTE — PROGRESS NOTE ADULT - ASSESSMENT
Assessment  67F s/p resection of anal cancer s/p APR on 7/24, now admitted for pneumonia and UTI.    Plan:  - Care of UTI and pneumonia per medicine and pulmonary  - Continue current bowel regimen (senna, colace, miralax)  - Please ask nursing staff to record ostomy output  - PT wound care consult for possible replacement of wound vac to perineal wound  - Colorectal surgery will follow    Scott Sellers, PGY-2  [facility] [phone]

## 2018-08-15 NOTE — CONSULT NOTE ADULT - PROBLEM SELECTOR RECOMMENDATION 2
-right basilar patchy opacities  -continue broad spectrum antibiotics  -check sputum culture  -would obtain dedicated CT chest noncontrast to evaluate remainder of lung parenchyma  -incentive spirometer and acapella  -Maintain O2 sat > 90%  -given location of opacities would consider aspiration as a potential cause of her symptoms

## 2018-08-15 NOTE — PROGRESS NOTE ADULT - PROBLEM SELECTOR PLAN 4
s/p mitomycin/xeloda and RT 5/17 to 6/17. PET 1/18 showed hypermetabolism in anal area with rad onc concerned for local relapse/failure provied by biopsy 2/18  - underwent APR with Dr. Terrell Luong 7/24/18 with permanent end colostomy  -purulence around wound however it is draining what appears to been mucus  - CRC recs appreciated, PT wound care consult placed for replacement of wound vac  - miralax for constipation, added lactulose today  - outpatient f/u with Dr. King

## 2018-08-15 NOTE — PROGRESS NOTE ADULT - PROBLEM SELECTOR PLAN 7
Diet: carb consistent  VTE: eliquis 5 mg bid  PT eval pending - aiss   - A1c in morning  monitor fs tid ac

## 2018-08-15 NOTE — PROGRESS NOTE ADULT - SUBJECTIVE AND OBJECTIVE BOX
Patient is a 67y old  Female who presents with a chief complaint of UTI (14 Aug 2018 21:17)      SUBJECTIVE / OVERNIGHT EVENTS:    ROS:  14 point ROS negative in detail except stated as above    MEDICATIONS  (STANDING):  ALBUTerol/ipratropium for Nebulization 3 milliLiter(s) Nebulizer every 6 hours  apixaban 5 milliGRAM(s) Oral every 12 hours  azithromycin  IVPB 500 milliGRAM(s) IV Intermittent every 24 hours  buDESOnide 160 MICROgram(s)/formoterol 4.5 MICROgram(s) Inhaler 2 Puff(s) Inhalation two times a day  cefepime   IVPB 2000 milliGRAM(s) IV Intermittent every 8 hours  chlorhexidine 4% Liquid 1 Application(s) Topical <User Schedule>  dextrose 5%. 1000 milliLiter(s) (50 mL/Hr) IV Continuous <Continuous>  dextrose 50% Injectable 12.5 Gram(s) IV Push once  dextrose 50% Injectable 25 Gram(s) IV Push once  dextrose 50% Injectable 25 Gram(s) IV Push once  digoxin     Tablet 0.25 milliGRAM(s) Oral daily  insulin lispro (HumaLOG) corrective regimen sliding scale   SubCutaneous three times a day before meals  insulin lispro (HumaLOG) corrective regimen sliding scale   SubCutaneous at bedtime  methylPREDNISolone 4 milliGRAM(s) Oral daily  montelukast 10 milliGRAM(s) Oral daily  pantoprazole    Tablet 40 milliGRAM(s) Oral before breakfast  polyethylene glycol 3350 17 Gram(s) Oral every 12 hours    MEDICATIONS  (PRN):  acetaminophen   Tablet 650 milliGRAM(s) Oral every 6 hours PRN Moderate Pain (4 - 6)  dextrose 40% Gel 15 Gram(s) Oral once PRN Blood Glucose LESS THAN 70 milliGRAM(s)/deciliter  glucagon  Injectable 1 milliGRAM(s) IntraMuscular once PRN Glucose LESS THAN 70 milligrams/deciliter  oxyCODONE    5 mG/acetaminophen 325 mG 1 Tablet(s) Oral every 6 hours PRN Severe Pain (7 - 10)      CAPILLARY BLOOD GLUCOSE      POCT Blood Glucose.: 189 mg/dL (15 Aug 2018 08:13)  POCT Blood Glucose.: 237 mg/dL (14 Aug 2018 22:05)    I&O's Summary    14 Aug 2018 07:01  -  15 Aug 2018 07:00  --------------------------------------------------------  IN: 270 mL / OUT: 500 mL / NET: -230 mL        PHYSICAL EXAM:  Vital Signs Last 24 Hrs  T(C): 36.7 (15 Aug 2018 04:25), Max: 37 (14 Aug 2018 12:18)  T(F): 98.1 (15 Aug 2018 04:25), Max: 98.6 (14 Aug 2018 12:18)  HR: 69 (15 Aug 2018 04:25) (69 - 82)  BP: 114/68 (15 Aug 2018 04:25) (114/68 - 134/67)  BP(mean): --  RR: 18 (15 Aug 2018 04:25) (16 - 20)  SpO2: 93% (15 Aug 2018 04:25) (93% - 95%)      General: NAD  	Eyes: PERRL/ EOMI  	ENT/Neck: Neck supple, trachea midline, No JVD  	Respiratory: upper expiratory wheezing noted b/l lung fields   	CV: S1S2 RRR no murmurs, rubs or gallops  	Abdominal: Soft, NT, ND +BS, colostomy bag in place c/d/i.  	Extremities: No edema, + 2 peripheral pulses b/l    Skin: No Rashes, Hematoma, Ecchymosis      LABS:                        9.4    9.77  )-----------( 432      ( 15 Aug 2018 07:28 )             30.9     08-15    140  |  99  |  9   ----------------------------<  157<H>  3.9   |  29  |  0.67    Ca    8.7      15 Aug 2018 06:38    TPro  6.5  /  Alb  3.0<L>  /  TBili  0.2  /  DBili  x   /  AST  13  /  ALT  10  /  AlkPhos  87  08-15    PT/INR - ( 15 Aug 2018 07:29 )   PT: 16.7 sec;   INR: 1.47 ratio         PTT - ( 14 Aug 2018 13:09 )  PTT:33.7 sec      Urinalysis Basic - ( 14 Aug 2018 14:05 )    Color: Orange / Appearance: SL Turbid / S.008 / pH: x  Gluc: x / Ketone: Negative  / Bili: Negative / Urobili: Negative   Blood: x / Protein: Trace / Nitrite: Positive   Leuk Esterase: Large / RBC: 0-2 /HPF / WBC >50 /HPF   Sq Epi: x / Non Sq Epi: Occasional /HPF / Bacteria: Few /HPF            Care Discussed with Consultants/Other Providers:  ALLEY voss

## 2018-08-15 NOTE — PROGRESS NOTE ADULT - PROBLEM SELECTOR PLAN 5
- hold bp meds for now as bps have been in the 100-120 systolic range c/w senna, colace, miralax  no ostomy output in few days  lactulose x1

## 2018-08-15 NOTE — CHART NOTE - NSCHARTNOTEFT_GEN_A_CORE
Called by NP to see Ms. Bloom. Patient states she is followed by Dr. Begum. Informed patient that Dr. Begum is being covered by Dr. Esteban. Patient requested to be seen by Dr. Esteban.    Informed NP who will contact Dr. Esteban.    House ID remains available for any questions.    Otherwise, will not see the patient unless called back.    Bear Broderick MD  Pager [phone]  After 5pm/weekends call [phone]

## 2018-08-15 NOTE — CONSULT NOTE ADULT - PROBLEM SELECTOR RECOMMENDATION 3
-s/p tracheobronchoplasty  -aggressive chest PT to assist with mucus clearance  -Acapella  -consider chest vest if patient agreeable  -maintain O2 sat > 90%  -outpatient followup with Dr. Zapien at [facility]

## 2018-08-15 NOTE — PROGRESS NOTE ADULT - PROBLEM SELECTOR PLAN 2
- pt has history of paraxoysmal Afib, EKG ordered  - continue with eliquis 5 mg bid  - continue with digoxin. 25 mg daily

## 2018-08-15 NOTE — CONSULT NOTE ADULT - ASSESSMENT
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
A/P 67F s/p resection of anal cancer, now with pneumonia and UTI.  -care of UTI and pneumonia per medicine and pulmonary  -miralax for constipation  -PT wound care consult for possible replacement of wound vac to perineal wound  -colorectal surgery will follow  -discussed with colorectal fellow
67 year old woman with COPD, Ashtma on chronic steroids, tracheobronchomalacia s/p bronchial thermoplasty (10/16), Afib on Eliquis, HTN, adrenal insufficiency, T2DM, DVT, SCC anus (dx 2016, s/p chemo rad s/p APR 7/24/18) presenting from rehabilitation with fevers.    Afebrile now  Leukocytosis resolved  UA with pyuria, LE large, Nitrite positive  CT A/P with RLL PNA  UTI, PNA    Recommend:
The patient is a 66 yo woman with PMH of COPD, ashtma on chronic steroids, tracheobronchomalacia s/p bronchial thermoplasty (10/16), Afib on Eliquis, HTN, adrenal insufficiency, T2DM, DVT, SCC anus (dx 2016, s/p chemo rad s/p APR 7/24/18) presenting from rehabiliation with fevers. The patient stated she felt subjective fevers yesterday to T max 100.2 this morning, she was informed given she is on steroids T > 99 would be fever, prompting her to come to the ED. She is denying any chills URI symptoms of rhinorrhea, sore throat, dysphagia. She has cough that is sometimes productive with greenish phlegm however she has had it for several years. She feels increased SOB non-exertional from decreased inspiratory effort. During patient's previous admission end of July 2018 she developed fevers with positive urine culture. She was treated with 7 day course of ceftriaxone. She is reporting continued dysuria, increased frequency, incontinence. The patient ostomy has not emptied since she was in rehabilitation, she is citing functioing during previous admission prior to rehabilitation. She is denying any sick contacts.    Pt states she has an open draining wound in the perineum, for which she had a wound vac that is now off.  Drainage is malodorous.     Problem/Plan - 1:    ·	HCAP/aspiration pna    - CTap concerning for RLL pna.  Agree with broad spectrum abx coverage with vanco/cefepime given recent hospitalization.  Recent sputum cx grew pseudomonas    - Check repeat sputum cx.  Legionella was negative    - f/u blood cultures    Problem/Plan - 2:    ·	UTI    - (+) UA with urinary symptoms.  Send urine cultures    - Cont abx.  Will add diflucan to cover for yeast    Problem/Plan - 3:    ·	Open perineal wound    - Pt with recent anal SCC and resection.  Recommend wound care evaluation for wound vac/topical care recommendations    - Colorectal surgery f/u    Will follow    Joselyn Esteban  [phone]

## 2018-08-15 NOTE — CHART NOTE - NSCHARTNOTEFT_GEN_A_CORE
Case discussed with covering Infectious Disease attending Dr. Esteban. Pt needs Vancomycin added to Abx regiment for adequate HCAP coverage. Pt also found to have budding yeast on UA, Diflucan PO also to be added. Urine culture to be sent. Will discuss with primary team in AM.    Pepito Saldivar PA-C  Department of Medicine  [phone]

## 2018-08-15 NOTE — CONSULT NOTE ADULT - PROBLEM SELECTOR RECOMMENDATION 5
-Colorectal surgery followup - s/p abdominoperineal resection 7/24  -Consider Hem/Onc evaluation  -Patient has had chemotherapy and radiation

## 2018-08-15 NOTE — CONSULT NOTE ADULT - PROBLEM SELECTOR RECOMMENDATION 9
-patient with positive UA and abnormal CT concerning for pneumonia  -continue Cefepime. Would consider giving Vanco given recent hospitalization until cultures return  -Would d/c Azithromycin given negative legionella  -check sputum culture  -followup blood and urine cultures -DVT proph - on AC for Afib  -GI proph  -Maintain O2 sat > 90%  -Aggressive chest PT - Acapella  -Aspiration precautions -patient with positive UA and abnormal CT concerning for pneumonia  -continue Cefepime. Would consider giving Vanco given recent hospitalization until cultures return  -Would d/c Azithromycin given negative legionella  -check sputum culture  -followup blood cultures  -it does not appear urine culture was sent - but UA is positive and has budding yeast - would consider antifungal treatment. Followup ID evaluation

## 2018-08-15 NOTE — CONSULT NOTE ADULT - SUBJECTIVE AND OBJECTIVE BOX
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 She returns to the ED with complaints of fever. She noted a fever over the last few days that started as low grade 99.2 and slowly increased.  She denies chills or urin symptoms. She denies dysphagia. She has a chronic cough which is productive of green sputum. She feels that her respiratory status is a little bit more labored. She does describe dysurina aand urinary frequency but she states that this has not improved since she was treated for a UTI with Ceftriaxone during her previous stay.    Patient has an extensive past medical history including tracheomalacia s/p tracheoplasty, COPD with severe obstructive lung disease on many bronchodilators in the past, Afib on Eliquis, DM2, HTN, and adrenal insufficieny on chronic steroids. She has had multiple positive past sputum cultures and most recently with pseudomonas.    She has had asthma since age 12. On her last hospitalization she was treated with Symbicort, Spiriva, and Duonebs Q6. She also receives Xolair as an outpatient. She has been on Mucomyst, Performist, Budesonide, Breo-Ellipta, and Accolate at varying times as an outpatient as well. She has a chronic cough and feels tight when she does not use her airway clearance therapies. She has a chest vest but it is still in [place] and she does not use it presently. She denies sick contacts but was recently in a hospital.    She denies chest pain or palpitations. She denies nausea or vomiting. However, she states that for the last 3 days she has not had any significant output from her ostomy. She is told that her CT scan shows a significant amount of stool but she does not feel constipated. She is passing gas. She was initially on a wound vac upon going to rehab but states that was discontinued there.       ====================BACKGROUND INFORMATION============================    FAMILY HISTORY: FAMILY HISTORY:  Family history of diabetes mellitus type II  Family history of breast cancer (Sibling)  Family history of asthma      PAST MEDICAL AND SURGICAL HISTORY: PAST MEDICAL & SURGICAL HISTORY:  TIA (transient ischemic attack): multiple, last 5 years ago - presents as right-sided weakness  DVT (deep venous thrombosis): 15-20 years ago, took coumadin  Seizure: x 1 18  Rectal bleeding  Colorectal cancer: 2018- last treatment , chemo and radiation  Tracheobronchomalacia: diagnosed , s/p bronchial thermoplasty  (Dr Zapien); recent bronchoscopy 2018 revealed no evidence of tracheobronchomalacia in trachea or bronchial tubes  Asthma  Pelvic fracture  Aortic insufficiency: moderate AR on echo 5/3/2018  Adrenal insufficiency: Medrol daily for over 50 years  COPD (chronic obstructive pulmonary disease)  Diabetes: Type 2  Atrial fibrillation: paroxysmal, on eliquis  Rectal bleeding: exam under anesthesia ([facility]) 2018  S/P bronchoscopy: 2018 - [facility] (Dr Zapien) no evidence of tracheobronchomalacia in trachea or bronchial tubes  History of tracheomalacia:  - attempted tracheal stenting ([facility])- course complicated by obstruction, respiratory failure, multiple CPR attempts -  stent discontinued; 10/20/2016 Tracheobronchoplasty (Prolene Mesh) performed at [facility] by Dr Zapien  H/O pelvic surgery: 5 years ago - s/p fracture  Exostosis of orbit, 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  History of sinus surgery: multiple sinus surgeries  H/O total knee replacement, bilateral: 5 years ago  History of partial hysterectomy: 30 years ago - fibroids      ALLERGIES:Allergies    ampicillin (Short breath)  aspirin (Short breath)  Avelox (Short breath; Pruritus)  codeine (Short breath)  Dilaudid (Short breath)  iodine (Short breath; Swelling)  penicillin (Short breath)  shellfish (Anaphylaxis)  tetanus toxoid (Short breath)  Valium (Short breath)      HOME MEDICATIONS: reviewed     OUTPATIENT PULMONARY DOCTOR: Eulalio Allison MD    SOCIAL HISTORY  TOBACCO USE: denied   ALCOHOL: denied   DRUGS: denied   MARITAL STATUS:    EMPLOYMENT:    EXPOSURES: none   RECENT TRAVELS: none   CODE STATUS: full code     ====================REVIEW OF SYSTEMS=====================================  CONSTITUTIONAL: Feels ok  CARDIOVASCULAR: Denies chest pain or palpitations  PULMONARY: Cough with sputum, no hemoptysis, SOB  GASTROINTESTINAL: Denies nausea or vomiting, No stool output  [x] ALL OTHER REVIEW OF SYSTEMS ARE NEGATIVE   [] UNABLE TO OBTAIN REVIEW OF SYSTEMS DUE TO ______________      ====================PHYSICAL EXAM=========================================    VITALS: ICU Vital Signs Last 24 Hrs  T(C): 36.7 (15 Aug 2018 04:25), Max: 37 (14 Aug 2018 12:18)  T(F): 98.1 (15 Aug 2018 04:25), Max: 98.6 (14 Aug 2018 12:18)  HR: 69 (15 Aug 2018 04:25) (69 - 82)  BP: 114/68 (15 Aug 2018 04:25) (114/68 - 134/67)  RR: 18 (15 Aug 2018 04:25) (16 - 20)  SpO2: 93% (15 Aug 2018 04:25) (93% - 95%)      INTAKE and OUTPUT: I&O's Summary    14 Aug 2018 07:01  -  15 Aug 2018 07:00  --------------------------------------------------------  IN: 270 mL / OUT: 500 mL / NET: -230 mL      WEIGHT: Daily Height in cm: 170.18 (14 Aug 2018 20:02)    Daily Weight in k.2 (15 Aug 2018 05:30)    GLUCOSE: CAPILLARY BLOOD GLUCOSE: POCT Blood Glucose.: 189 mg/dL (15 Aug 2018 08:13)    VENTILATOR SETTINGS: N/A    GENERAL: AAOx3, NAD, comfortable laying flat  HEENT: NCAT, EOMI, MMM, nares clear, trachea midline  NECK: supple, no JVD  LYMPH NODES: no palpable supraclavicular LAD  CARDIOVASCULAR: irreg irreg, S1S2, systolic murmur  PULMONARY: coarse BS bilaterally with end expiratory wheeze, no accessory muscle use  ABDOMEN: soft, NT, ND, +ostomy  SKIN: warm and dry  EXTREMITIES: no cyanosis, no LE edema  NEUROLOGIC: nonfocal exam, moves all extremities  PSYCHIATRIC: calm    ====================MEDICATIONS===========================================  MEDICATIONS  (STANDING):  ALBUTerol    90 MICROgram(s) HFA Inhaler 1 Puff(s) Inhalation every 4 hours  ALBUTerol/ipratropium for Nebulization 3 milliLiter(s) Nebulizer every 6 hours  apixaban 5 milliGRAM(s) Oral every 12 hours  azithromycin  IVPB 500 milliGRAM(s) IV Intermittent every 24 hours  buDESOnide 160 MICROgram(s)/formoterol 4.5 MICROgram(s) Inhaler 2 Puff(s) Inhalation two times a day  cefepime   IVPB 2000 milliGRAM(s) IV Intermittent every 8 hours  chlorhexidine 4% Liquid 1 Application(s) Topical <User Schedule>  dextrose 5%. 1000 milliLiter(s) (50 mL/Hr) IV Continuous <Continuous>  dextrose 50% Injectable 12.5 Gram(s) IV Push once  dextrose 50% Injectable 25 Gram(s) IV Push once  dextrose 50% Injectable 25 Gram(s) IV Push once  digoxin     Tablet 0.25 milliGRAM(s) Oral daily  insulin lispro (HumaLOG) corrective regimen sliding scale   SubCutaneous three times a day before meals  insulin lispro (HumaLOG) corrective regimen sliding scale   SubCutaneous at bedtime  methylPREDNISolone 4 milliGRAM(s) Oral daily  montelukast 10 milliGRAM(s) Oral daily  pantoprazole    Tablet 40 milliGRAM(s) Oral before breakfast  polyethylene glycol 3350 17 Gram(s) Oral every 12 hours  tiotropium 18 MICROgram(s) Capsule 1 Capsule(s) Inhalation daily    MEDICATIONS  (PRN):  acetaminophen   Tablet 650 milliGRAM(s) Oral every 6 hours PRN Moderate Pain (4 - 6)  dextrose 40% Gel 15 Gram(s) Oral once PRN Blood Glucose LESS THAN 70 milliGRAM(s)/deciliter  glucagon  Injectable 1 milliGRAM(s) IntraMuscular once PRN Glucose LESS THAN 70 milligrams/deciliter  oxyCODONE    5 mG/acetaminophen 325 mG 1 Tablet(s) Oral every 6 hours PRN Severe Pain (7 - 10)      ====================LABORATORY RESULTS====================================  CBC Full  -  ( 15 Aug 2018 07:28 )  WBC Count : 9.77 K/uL  Hemoglobin : 9.4 g/dL  Hematocrit : 30.9 %  Platelet Count - Automated : 432 K/uL  Mean Cell Volume : 74.5 fl  Mean Cell Hemoglobin : 22.7 pg  Mean Cell Hemoglobin Concentration : 30.4 gm/dL  Auto Neutrophil # : 7.38 K/uL  Auto Lymphocyte # : 1.01 K/uL  Auto Monocyte # : 0.87 K/uL  Auto Eosinophil # : 0.48 K/uL  Auto Basophil # : 0.01 K/uL  Auto Neutrophil % : 75.6 %  Auto Lymphocyte % : 10.3 %  Auto Monocyte % : 8.9 %  Auto Eosinophil % : 4.9 %  Auto Basophil % : 0.1 %                                    08-15    140  |  99  |  9   ----------------------------<  157<H>  3.9   |  29  |  0.67    Ca    8.7      15 Aug 2018 06:38    TPro  6.5  /  Alb  3.0<L>  /  TBili  0.2  /  DBili  x   /  AST  13  /  ALT  10  /  AlkPhos  87  0815        PT/INR - ( 15 Aug 2018 07:29 )   PT: 16.7 sec;   INR: 1.47 ratio         PTT - ( 14 Aug 2018 13:09 )  PTT:33.7 sec    Creatinine Trend: 0.67<--, 0.67<--, 0.60<--, 0.60<--, 0.60<--, 0.61<--      ====================RADIOLOGY and ECHOCARDIOGRAPHY=======================    CXR:< from: Xray Chest 1 View- PORTABLE-Urgent (18 @ 14:30) >  IMPRESSION:   No acute process.  < end of copied text >      CT:   < from: CT Abdomen and Pelvis No Cont (18 @ 14:08) >  FINDINGS:    LOWER CHEST: Increasing patchy right basilar opacities concerning for pneumonia. Coronary calcifications.    LIVER: Within normal limits.  BILE DUCTS: Normal caliber.  GALLBLADDER: Within normal limits.  SPLEEN: Within normal limits.  PANCREAS: Redemonstrated pancreatic tail cysts measuring up to 2.0 cm. No   pancreatic ductal dilatation.  ADRENALS: Within normal limits.  KIDNEYS/URETERS: No hydronephrosis. A few small left renal calcifications.    BLADDER: Within normal limits.  REPRODUCTIVE ORGANS: Hysterectomy. No adnexal masses.    BOWEL: No bowel obstruction. Left lower quadrant colostomy.  PERITONEUM: No ascites or intra-abdominal collection.  VESSELS:  Atherosclerotic calcifications.  RETROPERITONEUM: No lymphadenopathy.    ABDOMINAL WALL: Left lower quadrant ostomy. Postsurgical changes of the anterior abdominal wall.  BONES: Degenerative changes. Right iliac fixation screw and pubic symphysis orthopedic hardware.    IMPRESSION:     Worsening opacity in the right lower lobe consistent with pneumonia.  No acute intra-abdominal pathology or collection.  < end of copied text >    ECHOCARDIOGRAPHY:

## 2018-08-15 NOTE — PROGRESS NOTE ADULT - PROBLEM SELECTOR PLAN 6
- aiss   - A1c in morning  monitor fs tid ac - hold bp meds for now as bps have been in the 100-120 systolic range

## 2018-08-15 NOTE — PROGRESS NOTE ADULT - PROBLEM SELECTOR PLAN 1
ct scan showing worsening opacity in RLL  follow up blood cultures  c/w cefipime IV  urine legionella neg can d/c azithromycin pending id recs  c/w chest pt, acapella, duonebs  pulm c/s, id c/s ct scan showing worsening opacity in RLL, will order ct chest noncon for further eval  follow up blood cultures, sputum cx  c/w cefepime IV  urine legionella neg can d/c azithromycin pending id recs  c/w chest pt, acapella, duonebs, aspiration precautions  pulm c/s, id c/s

## 2018-08-15 NOTE — PROGRESS NOTE ADULT - SUBJECTIVE AND OBJECTIVE BOX
Surgery Progress Note    S: Patient seen and examined. No acute events overnight. Patient reports not having output from her ostomy for several days (has given different providers different answers ranging from 4-9 days).    O:  Vital Signs Last 24 Hrs  T(C): 36.9 (15 Aug 2018 12:45), Max: 36.9 (14 Aug 2018 20:02)  T(F): 98.5 (15 Aug 2018 12:45), Max: 98.5 (15 Aug 2018 12:45)  HR: 83 (15 Aug 2018 12:45) (69 - 83)  BP: 126/68 (15 Aug 2018 12:45) (114/68 - 134/67)  BP(mean): --  RR: 18 (15 Aug 2018 12:45) (17 - 18)  SpO2: 94% (15 Aug 2018 12:45) (93% - 94%)    I&O's Detail    14 Aug 2018 07:01  -  15 Aug 2018 07:00  --------------------------------------------------------  IN:    IV PiggyBack: 150 mL    Oral Fluid: 120 mL  Total IN: 270 mL    OUT:    Voided: 500 mL  Total OUT: 500 mL    Total NET: -230 mL      15 Aug 2018 07:01  -  15 Aug 2018 17:54  --------------------------------------------------------  IN:    Oral Fluid: 760 mL  Total IN: 760 mL    OUT:    Voided: 600 mL  Total OUT: 600 mL    Total NET: 160 mL          MEDICATIONS  (STANDING):  ALBUTerol/ipratropium for Nebulization 3 milliLiter(s) Nebulizer every 6 hours  apixaban 5 milliGRAM(s) Oral every 12 hours  buDESOnide 160 MICROgram(s)/formoterol 4.5 MICROgram(s) Inhaler 2 Puff(s) Inhalation two times a day  cefepime   IVPB 2000 milliGRAM(s) IV Intermittent every 8 hours  chlorhexidine 4% Liquid 1 Application(s) Topical <User Schedule>  dextrose 5%. 1000 milliLiter(s) (50 mL/Hr) IV Continuous <Continuous>  dextrose 50% Injectable 12.5 Gram(s) IV Push once  dextrose 50% Injectable 25 Gram(s) IV Push once  dextrose 50% Injectable 25 Gram(s) IV Push once  digoxin     Tablet 0.25 milliGRAM(s) Oral daily  docusate sodium 100 milliGRAM(s) Oral three times a day  insulin glargine Injectable (LANTUS) 8 Unit(s) SubCutaneous at bedtime  insulin glargine Injectable (LANTUS) 8 Unit(s) SubCutaneous at bedtime  insulin lispro (HumaLOG) corrective regimen sliding scale   SubCutaneous three times a day before meals  insulin lispro (HumaLOG) corrective regimen sliding scale   SubCutaneous at bedtime  methylPREDNISolone 4 milliGRAM(s) Oral daily  montelukast 10 milliGRAM(s) Oral daily  pantoprazole    Tablet 40 milliGRAM(s) Oral before breakfast  polyethylene glycol 3350 17 Gram(s) Oral every 12 hours  senna 2 Tablet(s) Oral at bedtime    MEDICATIONS  (PRN):  acetaminophen   Tablet 650 milliGRAM(s) Oral every 6 hours PRN Moderate Pain (4 - 6)  dextrose 40% Gel 15 Gram(s) Oral once PRN Blood Glucose LESS THAN 70 milliGRAM(s)/deciliter  glucagon  Injectable 1 milliGRAM(s) IntraMuscular once PRN Glucose LESS THAN 70 milligrams/deciliter  oxyCODONE    5 mG/acetaminophen 325 mG 1 Tablet(s) Oral every 6 hours PRN Severe Pain (7 - 10)                            9.4    9.77  )-----------( 432      ( 15 Aug 2018 07:28 )             30.9       08-15    140  |  99  |  9   ----------------------------<  157<H>  3.9   |  29  |  0.67    Ca    8.7      15 Aug 2018 06:38    TPro  6.5  /  Alb  3.0<L>  /  TBili  0.2  /  DBili  x   /  AST  13  /  ALT  10  /  AlkPhos  87  08-15      Physical Exam:  Gen: Laying in bed, NAD  Resp: Unlabored breathing  Abd: soft, non-tender, mildly distended. Well-healed incisions. Ostomy pink/viable with gas in bag, some mucus around stoma.  Ext: WWP  Skin: No rashes

## 2018-08-16 DIAGNOSIS — N39.0 URINARY TRACT INFECTION, SITE NOT SPECIFIED: ICD-10-CM

## 2018-08-16 LAB
ANION GAP SERPL CALC-SCNC: 10 MMOL/L — SIGNIFICANT CHANGE UP (ref 5–17)
BUN SERPL-MCNC: 7 MG/DL — SIGNIFICANT CHANGE UP (ref 7–23)
CALCIUM SERPL-MCNC: 8.6 MG/DL — SIGNIFICANT CHANGE UP (ref 8.4–10.5)
CHLORIDE SERPL-SCNC: 98 MMOL/L — SIGNIFICANT CHANGE UP (ref 96–108)
CO2 SERPL-SCNC: 28 MMOL/L — SIGNIFICANT CHANGE UP (ref 22–31)
CREAT SERPL-MCNC: 0.62 MG/DL — SIGNIFICANT CHANGE UP (ref 0.5–1.3)
GLUCOSE BLDC GLUCOMTR-MCNC: 152 MG/DL — HIGH (ref 70–99)
GLUCOSE BLDC GLUCOMTR-MCNC: 198 MG/DL — HIGH (ref 70–99)
GLUCOSE BLDC GLUCOMTR-MCNC: 208 MG/DL — HIGH (ref 70–99)
GLUCOSE BLDC GLUCOMTR-MCNC: 287 MG/DL — HIGH (ref 70–99)
GLUCOSE SERPL-MCNC: 170 MG/DL — HIGH (ref 70–99)
HCT VFR BLD CALC: 30.9 % — LOW (ref 34.5–45)
HGB BLD-MCNC: 9.5 G/DL — LOW (ref 11.5–15.5)
MAGNESIUM SERPL-MCNC: 1.6 MG/DL — SIGNIFICANT CHANGE UP (ref 1.6–2.6)
MCHC RBC-ENTMCNC: 22.6 PG — LOW (ref 27–34)
MCHC RBC-ENTMCNC: 30.7 GM/DL — LOW (ref 32–36)
MCV RBC AUTO: 73.6 FL — LOW (ref 80–100)
PLATELET # BLD AUTO: 422 K/UL — HIGH (ref 150–400)
POTASSIUM SERPL-MCNC: 3.6 MMOL/L — SIGNIFICANT CHANGE UP (ref 3.5–5.3)
POTASSIUM SERPL-SCNC: 3.6 MMOL/L — SIGNIFICANT CHANGE UP (ref 3.5–5.3)
RBC # BLD: 4.2 M/UL — SIGNIFICANT CHANGE UP (ref 3.8–5.2)
RBC # FLD: 15.9 % — HIGH (ref 10.3–14.5)
SODIUM SERPL-SCNC: 136 MMOL/L — SIGNIFICANT CHANGE UP (ref 135–145)
WBC # BLD: 10.51 K/UL — HIGH (ref 3.8–10.5)
WBC # FLD AUTO: 10.51 K/UL — HIGH (ref 3.8–10.5)

## 2018-08-16 PROCEDURE — 74018 RADEX ABDOMEN 1 VIEW: CPT | Mod: 26

## 2018-08-16 PROCEDURE — 99233 SBSQ HOSP IP/OBS HIGH 50: CPT

## 2018-08-16 PROCEDURE — 99232 SBSQ HOSP IP/OBS MODERATE 35: CPT

## 2018-08-16 RX ORDER — METOCLOPRAMIDE HCL 10 MG
10 TABLET ORAL ONCE
Qty: 0 | Refills: 0 | Status: COMPLETED | OUTPATIENT
Start: 2018-08-16 | End: 2018-08-16

## 2018-08-16 RX ORDER — METOCLOPRAMIDE HCL 10 MG
10 TABLET ORAL ONCE
Qty: 0 | Refills: 0 | Status: COMPLETED | OUTPATIENT
Start: 2018-08-17 | End: 2018-08-17

## 2018-08-16 RX ORDER — SIMETHICONE 80 MG/1
80 TABLET, CHEWABLE ORAL
Qty: 0 | Refills: 0 | Status: DISCONTINUED | OUTPATIENT
Start: 2018-08-16 | End: 2018-08-17

## 2018-08-16 RX ORDER — INSULIN LISPRO 100/ML
3 VIAL (ML) SUBCUTANEOUS
Qty: 0 | Refills: 0 | Status: DISCONTINUED | OUTPATIENT
Start: 2018-08-16 | End: 2018-08-16

## 2018-08-16 RX ORDER — SIMETHICONE 80 MG/1
80 TABLET, CHEWABLE ORAL ONCE
Qty: 0 | Refills: 0 | Status: COMPLETED | OUTPATIENT
Start: 2018-08-16 | End: 2018-08-16

## 2018-08-16 RX ORDER — METOCLOPRAMIDE HCL 10 MG
10 TABLET ORAL EVERY 6 HOURS
Qty: 0 | Refills: 0 | Status: COMPLETED | OUTPATIENT
Start: 2018-08-16 | End: 2018-08-19

## 2018-08-16 RX ORDER — INSULIN LISPRO 100/ML
3 VIAL (ML) SUBCUTANEOUS
Qty: 0 | Refills: 0 | Status: DISCONTINUED | OUTPATIENT
Start: 2018-08-16 | End: 2018-08-17

## 2018-08-16 RX ORDER — LACTULOSE 10 G/15ML
10 SOLUTION ORAL ONCE
Qty: 0 | Refills: 0 | Status: COMPLETED | OUTPATIENT
Start: 2018-08-16 | End: 2018-08-16

## 2018-08-16 RX ORDER — INSULIN GLARGINE 100 [IU]/ML
11 INJECTION, SOLUTION SUBCUTANEOUS AT BEDTIME
Qty: 0 | Refills: 0 | Status: DISCONTINUED | OUTPATIENT
Start: 2018-08-16 | End: 2018-08-17

## 2018-08-16 RX ORDER — MORPHINE SULFATE 50 MG/1
1 CAPSULE, EXTENDED RELEASE ORAL ONCE
Qty: 0 | Refills: 0 | Status: DISCONTINUED | OUTPATIENT
Start: 2018-08-16 | End: 2018-08-16

## 2018-08-16 RX ADMIN — Medication 100 MILLIGRAM(S): at 06:02

## 2018-08-16 RX ADMIN — Medication 4 MILLIGRAM(S): at 05:54

## 2018-08-16 RX ADMIN — BUDESONIDE AND FORMOTEROL FUMARATE DIHYDRATE 2 PUFF(S): 160; 4.5 AEROSOL RESPIRATORY (INHALATION) at 05:54

## 2018-08-16 RX ADMIN — FLUCONAZOLE 100 MILLIGRAM(S): 150 TABLET ORAL at 12:13

## 2018-08-16 RX ADMIN — Medication 3 UNIT(S): at 17:09

## 2018-08-16 RX ADMIN — Medication 10 MILLIGRAM(S): at 18:22

## 2018-08-16 RX ADMIN — Medication 1: at 17:09

## 2018-08-16 RX ADMIN — APIXABAN 5 MILLIGRAM(S): 2.5 TABLET, FILM COATED ORAL at 10:07

## 2018-08-16 RX ADMIN — Medication 100 MILLIGRAM(S): at 13:03

## 2018-08-16 RX ADMIN — Medication 3 UNIT(S): at 12:58

## 2018-08-16 RX ADMIN — CEFEPIME 100 MILLIGRAM(S): 1 INJECTION, POWDER, FOR SOLUTION INTRAMUSCULAR; INTRAVENOUS at 13:02

## 2018-08-16 RX ADMIN — PANTOPRAZOLE SODIUM 40 MILLIGRAM(S): 20 TABLET, DELAYED RELEASE ORAL at 06:02

## 2018-08-16 RX ADMIN — Medication 2: at 08:14

## 2018-08-16 RX ADMIN — Medication 100 MILLIGRAM(S): at 22:19

## 2018-08-16 RX ADMIN — MONTELUKAST 10 MILLIGRAM(S): 4 TABLET, CHEWABLE ORAL at 12:14

## 2018-08-16 RX ADMIN — SIMETHICONE 80 MILLIGRAM(S): 80 TABLET, CHEWABLE ORAL at 08:46

## 2018-08-16 RX ADMIN — POLYETHYLENE GLYCOL 3350 17 GRAM(S): 17 POWDER, FOR SOLUTION ORAL at 22:31

## 2018-08-16 RX ADMIN — MORPHINE SULFATE 1 MILLIGRAM(S): 50 CAPSULE, EXTENDED RELEASE ORAL at 13:53

## 2018-08-16 RX ADMIN — Medication 3 MILLILITER(S): at 12:14

## 2018-08-16 RX ADMIN — Medication 3 MILLILITER(S): at 23:40

## 2018-08-16 RX ADMIN — INSULIN GLARGINE 11 UNIT(S): 100 INJECTION, SOLUTION SUBCUTANEOUS at 22:32

## 2018-08-16 RX ADMIN — Medication 3 MILLILITER(S): at 05:54

## 2018-08-16 RX ADMIN — CEFEPIME 100 MILLIGRAM(S): 1 INJECTION, POWDER, FOR SOLUTION INTRAMUSCULAR; INTRAVENOUS at 22:20

## 2018-08-16 RX ADMIN — Medication 250 MILLIGRAM(S): at 22:31

## 2018-08-16 RX ADMIN — Medication 3: at 12:13

## 2018-08-16 RX ADMIN — LACTULOSE 10 GRAM(S): 10 SOLUTION ORAL at 12:57

## 2018-08-16 RX ADMIN — APIXABAN 5 MILLIGRAM(S): 2.5 TABLET, FILM COATED ORAL at 22:20

## 2018-08-16 RX ADMIN — POLYETHYLENE GLYCOL 3350 17 GRAM(S): 17 POWDER, FOR SOLUTION ORAL at 10:07

## 2018-08-16 RX ADMIN — MORPHINE SULFATE 1 MILLIGRAM(S): 50 CAPSULE, EXTENDED RELEASE ORAL at 14:10

## 2018-08-16 RX ADMIN — BUDESONIDE AND FORMOTEROL FUMARATE DIHYDRATE 2 PUFF(S): 160; 4.5 AEROSOL RESPIRATORY (INHALATION) at 17:10

## 2018-08-16 RX ADMIN — Medication 250 MILLIGRAM(S): at 08:46

## 2018-08-16 RX ADMIN — Medication 3 MILLILITER(S): at 17:10

## 2018-08-16 RX ADMIN — CEFEPIME 100 MILLIGRAM(S): 1 INJECTION, POWDER, FOR SOLUTION INTRAMUSCULAR; INTRAVENOUS at 06:02

## 2018-08-16 RX ADMIN — Medication 0.25 MILLIGRAM(S): at 05:54

## 2018-08-16 RX ADMIN — SIMETHICONE 80 MILLIGRAM(S): 80 TABLET, CHEWABLE ORAL at 07:07

## 2018-08-16 RX ADMIN — SENNA PLUS 2 TABLET(S): 8.6 TABLET ORAL at 22:19

## 2018-08-16 NOTE — PHYSICAL THERAPY INITIAL EVALUATION ADULT - PERTINENT HX OF CURRENT PROBLEM, REHAB EVAL
68 yo F h/o DVT, SCC anus (dx 2016, s/p chemo rad s/p APR 7/24/18) with presents from rehabiliation with fevers. During patient's previous admission end of July 2018 she developed fevers with positive urine culture. She is reporting continued dysuria, increased frequency, incontinence. The patient ostomy has not emptied since she was in rehabilitation, she is citing functiong during previous admission prior to rehabilitation.

## 2018-08-16 NOTE — PHYSICAL THERAPY INITIAL EVALUATION ADULT - MODALITIES TREATMENT COMMENTS
Perineal wound measuring 4.5 cm x.5 cm x.5 cm , 75% granulation tissue. No purulence, no erythema, no malodor.

## 2018-08-16 NOTE — CHART NOTE - NSCHARTNOTEFT_GEN_A_CORE
Upon Nutritional Assessment by the Registered Dietitian your patient was determined to meet criteria / has evidence of the following diagnosis/diagnoses:          [ ]  Mild Protein Calorie Malnutrition        [ ]  Moderate Protein Calorie Malnutrition        [x] Severe Protein Calorie Malnutrition        [ ] Unspecified Protein Calorie Malnutrition        [ ] Underweight / BMI <19        [ ] Morbid Obesity / BMI > 40      Findings as based on:  [x] Comprehensive nutrition assessment   [x] Nutrition Focused Physical Exam  [x] Other: decreased po intake >1month meeting <75% estimated nutrition needs, 16 pound (10.3%) significant wt loss x3 weeks      Nutrition Plan/Recommendations:    1) Continue with current diet order  2) Recommend provide Glucerna (vanilla) 2x/day        PROVIDER Section:     By signing this assessment you are acknowledging and agree with the diagnosis/diagnoses assigned by the Registered Dietitian    Comments:

## 2018-08-16 NOTE — PROGRESS NOTE ADULT - PROBLEM SELECTOR PLAN 4
s/p mitomycin/xeloda and RT 5/17 to 6/17. PET 1/18 showed hypermetabolism in anal area with rad onc concerned for local relapse/failure provied by biopsy 2/18  - underwent APR with Dr. Terrell Luong 7/24/18 with permanent end colostomy  -purulence around wound however it is draining what appears to been mucus  - CRC recs appreciated, PT wound care consult placed for replacement of wound vac  - miralax for constipation, senna, colace  - outpatient f/u with Dr. King - TTE 5/18 reviwed, patient has moderate Aortic insufficiency with EF 63%  - hemodynamically stable at this time

## 2018-08-16 NOTE — PROGRESS NOTE ADULT - ASSESSMENT
The patient is a 68 yo woman with PMH of COPD, ashtma on chronic steroids, tracheobronchomalacia s/p bronchial thermoplasty (10/16), Afib on Eliquis, HTN, adrenal insufficiency, T2DM, DVT, SCC anus (dx 2016, s/p chemo rad s/p APR 7/24/18) presenting from rehabiliation with fevers. The patient stated she felt subjective fevers yesterday to T max 100.2 this morning, she was informed given she is on steroids T > 99 would be fever, prompting her to come to the ED. She is denying any chills URI symptoms of rhinorrhea, sore throat, dysphagia. She has cough that is sometimes productive with greenish phlegm however she has had it for several years. She feels increased SOB non-exertional from decreased inspiratory effort. During patient's previous admission end of July 2018 she developed fevers with positive urine culture. She was treated with 7 day course of ceftriaxone. She is reporting continued dysuria, increased frequency, incontinence. The patient ostomy has not emptied since she was in rehabilitation, she is citing functioing during previous admission prior to rehabilitation. She is denying any sick contacts.    Pt states she has an open draining wound in the perineum, for which she had a wound vac that is now off.  Drainage is malodorous.     Problem/Plan - 1:    ·	HCAP/aspiration pna    - CT chest with multifocal R sided pna.   Agree with broad spectrum abx coverage with vanco/cefepime given recent hospitalization.  Recent sputum cx grew pseudomonas    - Check repeat sputum cx.  Legionella was negative    - f/u blood cultures    Problem/Plan - 2:    ·	UTI    - (+) UA with urinary symptoms.  Send urine cultures    - Cont abx.  Will add diflucan to cover for yeast    Problem/Plan - 3:    ·	Open perineal wound    - Pt with recent anal SCC and resection.  Recommend wound care evaluation for wound vac/topical care recommendations    - Colorectal surgery f/u    Will follow    Joselyn Esteban  [phone]

## 2018-08-16 NOTE — DIETITIAN INITIAL EVALUATION ADULT. - PHYSICAL APPEARANCE
NFPE reveals severe muscle wasting to temporal, moderate muscle wasting to clavicle, moderate fat loss to triceps. "my sister has noticed I have been thinning out"/other (specify)

## 2018-08-16 NOTE — PROGRESS NOTE ADULT - PROBLEM SELECTOR PLAN 5
c/w senna, colace, miralax  no ostomy output in few days  lactulose x2  ordered abd xray to r/o partial obstruction, f/u  f/u crs recs s/p mitomycin/xeloda and RT 5/17 to 6/17. PET 1/18 showed hypermetabolism in anal area with rad onc concerned for local relapse/failure provied by biopsy 2/18  - underwent APR with Dr. Terrell Luong 7/24/18 with permanent end colostomy  -purulence around wound however it is draining what appears to been mucus  - CRC recs appreciated, PT wound care consult placed for replacement of wound vac  - miralax for constipation, senna, colace  - outpatient f/u with Dr. King

## 2018-08-16 NOTE — PROGRESS NOTE ADULT - PROBLEM SELECTOR PLAN 4
symbicort 160 2 bid  spiriva 1 q day  singulair 10 qhs  pulmonary toilet-incentive spirometry, Chest Pt-acapella/chest vest-up to 5 times per day.    maintain oxygen levels above 90%-supplemental oxygen via nasal canula-humidified    All nebulized therapy is to be administered via hand held nebulizer-(patient must gargle and spit with water after use)

## 2018-08-16 NOTE — CHART NOTE - NSCHARTNOTEFT_GEN_A_CORE
D/w CRS Dr. Alfaro (Red Team #[id number]) re: pt remains constipated despite bowel regimen , passing gas via ostomy, now c/o "feeling queezy.", no vomiting, and was on Reglan in the past, AXR showing air-filled loops of bowel , likely  ileus.Per Dr. Alfaro : will review AXR and give recs.    Joy Tate [facility] [id number] D/w CRS Dr. Alfaro (Red Team #[id number]) re: pt remains constipated despite bowel regimen , passing gas via ostomy, now c/o "feeling queezy.", no vomiting, and was on Reglan in the past, AXR showing air-filled loops of bowel , likely  ileus.Per Dr. Alfaro : will review AXR and give recs.    Joy Tate  Medicine [id number]    Addendum 9960H: Per CRS Dr. Alfaro: continue with Reglan 10mg IVP q6h prn nausea. CRS f/u in am.

## 2018-08-16 NOTE — DIETITIAN INITIAL EVALUATION ADULT. - PROBLEM SELECTOR PLAN 3
- pt has history of paraxoysmal Afib, EKG ordered  - continue with eliquis 5 mg bid  - continue with digoxin 250 daily  - continue with telemetry monitoring

## 2018-08-16 NOTE — PROGRESS NOTE ADULT - ASSESSMENT
68 yo woman with PMH of COPD, ashtma on chronic steroids, tracheobronchomalacia s/p bronchial thermoplasty (10/16), Afib on Eliquis, HTN, adrenal insufficiency, T2DM, DVT, SCC anus (dx 2016, s/p chemo rad s/p APR 7/24/18) presenting from rehabilitation with fevers most likely 2/2  bacterial PNA

## 2018-08-16 NOTE — DIETITIAN INITIAL EVALUATION ADULT. - PROBLEM SELECTOR PLAN 1
- patient admitted with fevers, T max 100.2 this morning. Given history pt has slight chronic productive cough not worsened. Has had dysuria, increased frequency, documented positive urine culture on admission 2 weeks prior treated with ceftriaxone. No purulence, skin break down around surgery site. Colostomy c/d/i.  - source of fever most likely infectious. Most probably source of infection is UTI given UA+ with large LE/nitrites with dysuria/frequency/fevers vs PNA. Previous urine culture 8/3 showed no growth. No suprapubic tenderness or flank pain to suggest pyelo. Given pt immunocompromised with chronic steroid use and history of diabetes treat as complicated UTI. Additionally although less likely, given CT finding of RLL opacification will treat to cover respiratory val. Previous sputum culture 8/3 pseudomonas.  - pt has allergys to ampicillin and PCN. Received 1 dose of aztreonam, vancomycin in ED. Will treat with cefepmine(GNR and pseudomonal coverage) and azithromycin(atypical) pending urine legionella. Pt was able tolerate several days of IV ceftriaxone on prior admission. Unlikely abdominal source, if fevers persist will consider adding abdominal anerobic coverage.  - f/u blood culture, urine culture, sputum culture, urine legionella   -Cont. IV cefepime and azithromycin for now

## 2018-08-16 NOTE — PROGRESS NOTE ADULT - SUBJECTIVE AND OBJECTIVE BOX
Infectious Diseases progress note:    Subjective: Pt c/o bloating and abd discomfort, no ostomy output.  c/o nausea.  CT chest with multifocal pna.  No sob/fever/chills    ROS:  CONSTITUTIONAL:  No fever, chills, rigors  CARDIOVASCULAR:  No chest pain or palpitations  RESPIRATORY:   No SOB, cough, dyspnea on exertion.  No wheezing  GASTROINTESTINAL:  bloating/burping  EXTREMITIES:  No swelling or joint pain  GENITOURINARY:  No burning on urination, increased frequency or urgency.  No flank pain  NEUROLOGIC:  No HA, visual disturbances  SKIN: No rashes    Allergies    ampicillin (Short breath)  aspirin (Short breath)  Avelox (Short breath; Pruritus)  codeine (Short breath)  Dilaudid (Short breath)  iodine (Short breath; Swelling)  penicillin (Short breath)  shellfish (Anaphylaxis)  tetanus toxoid (Short breath)  Valium (Short breath)    Intolerances        ANTIBIOTICS/RELEVANT:  antimicrobials  cefepime   IVPB 2000 milliGRAM(s) IV Intermittent every 8 hours  fluconAZOLE   Tablet 100 milliGRAM(s) Oral daily  vancomycin  IVPB 1000 milliGRAM(s) IV Intermittent every 12 hours  vancomycin  IVPB        immunologic:    OTHER:  acetaminophen   Tablet 650 milliGRAM(s) Oral every 6 hours PRN  ALBUTerol/ipratropium for Nebulization 3 milliLiter(s) Nebulizer every 6 hours  apixaban 5 milliGRAM(s) Oral every 12 hours  buDESOnide 160 MICROgram(s)/formoterol 4.5 MICROgram(s) Inhaler 2 Puff(s) Inhalation two times a day  chlorhexidine 4% Liquid 1 Application(s) Topical <User Schedule>  dextrose 40% Gel 15 Gram(s) Oral once PRN  dextrose 5%. 1000 milliLiter(s) IV Continuous <Continuous>  dextrose 50% Injectable 12.5 Gram(s) IV Push once  dextrose 50% Injectable 25 Gram(s) IV Push once  dextrose 50% Injectable 25 Gram(s) IV Push once  digoxin     Tablet 0.25 milliGRAM(s) Oral daily  docusate sodium 100 milliGRAM(s) Oral three times a day  glucagon  Injectable 1 milliGRAM(s) IntraMuscular once PRN  insulin glargine Injectable (LANTUS) 11 Unit(s) SubCutaneous at bedtime  insulin lispro (HumaLOG) corrective regimen sliding scale   SubCutaneous three times a day before meals  insulin lispro (HumaLOG) corrective regimen sliding scale   SubCutaneous at bedtime  lactulose Syrup 10 Gram(s) Oral once  methylPREDNISolone 4 milliGRAM(s) Oral daily  montelukast 10 milliGRAM(s) Oral daily  pantoprazole    Tablet 40 milliGRAM(s) Oral before breakfast  polyethylene glycol 3350 17 Gram(s) Oral every 12 hours  senna 2 Tablet(s) Oral at bedtime  simethicone 80 milliGRAM(s) Chew four times a day PRN      Objective:  Vital Signs Last 24 Hrs  T(C): 37.6 (16 Aug 2018 05:19), Max: 37.6 (16 Aug 2018 05:19)  T(F): 99.6 (16 Aug 2018 05:19), Max: 99.6 (16 Aug 2018 05:19)  HR: 77 (16 Aug 2018 05:19) (77 - 83)  BP: 154/70 (16 Aug 2018 05:19) (115/68 - 154/70)  BP(mean): --  RR: 18 (16 Aug 2018 05:19) (18 - 18)  SpO2: 93% (16 Aug 2018 05:19) (93% - 95%)    PHYSICAL EXAM:  Constitutional:NAD  Eyes:EBER, EOMI  Ear/Nose/Throat: no thrush, mucositis.  Moist mucous membranes	  Neck:no JVD, no lymphadenopathy, supple  Respiratory: CTA barry  Cardiovascular: S1S2 RRR, no murmurs  Gastrointestinal:softly distended, nontender,  ostomy with minimal output  Extremities:no e/e/c  Skin:  open perineal wound        LABS:                        9.5    10.51 )-----------( 422      ( 16 Aug 2018 07:38 )             30.9     08-16    136  |  98  |  7   ----------------------------<  170<H>  3.6   |  28  |  0.62    Ca    8.6      16 Aug 2018 07:15  Mg     1.6     08-16    TPro  6.5  /  Alb  3.0<L>  /  TBili  0.2  /  DBili  x   /  AST  13  /  ALT  10  /  AlkPhos  87  08-15    PT/INR - ( 15 Aug 2018 07:29 )   PT: 16.7 sec;   INR: 1.47 ratio         PTT - ( 14 Aug 2018 13:09 )  PTT:33.7 sec  Urinalysis Basic - ( 14 Aug 2018 14:05 )    Color: Orange / Appearance: SL Turbid / S.008 / pH: x  Gluc: x / Ketone: Negative  / Bili: Negative / Urobili: Negative   Blood: x / Protein: Trace / Nitrite: Positive   Leuk Esterase: Large / RBC: 0-2 /HPF / WBC >50 /HPF   Sq Epi: x / Non Sq Epi: Occasional /HPF / Bacteria: Few /HPF                  Rapid RVP Result: [facility]          MICROBIOLOGY:    Culture - Blood (18 @ 17:19)    Specimen Source: .Blood Blood-Venous    Culture Results:   No growth to date.    Culture - Blood (18 @ 17:19)    Specimen Source: .Blood Blood-Peripheral    Culture Results:   No growth to date.          RADIOLOGY & ADDITIONAL STUDIES:    < from: CT Chest No Cont (08.15.18 @ 18:08) >  FINDINGS:     TUBES/LINES: Tip of the right Mediport catheter within the cavoatrial   junction.    AIRWAYS, LUNGS, PLEURA: Patent central airways. Mild-moderate bronchial   wall thickening within the lower lungs.    Consolidation and bronchocentric opacities within the bilateral right   lower lobe as well as patchy groundglass opacities within the superior   segment of right lower lobe right representing pneumonia. Mild   subsegmental atelectasis within the basilar left lower lobe.    Trace bilateral pleural effusions.    MEDIASTINUM, LYMPH NODES: Few subcentimeter mediastinal lymph nodes are   present.    HEART AND VASCULATURE: Normal heart size without pericardial effusion.   The thoracic aorta is normal in course and caliber. Aortic valve leaflet   calcification.    Dilated main pulmonary artery measuring 3.4 cm in diameter.    UPPER ABDOMEN: Mild aortic atherosclerosis.    BONES AND SOFT TISSUES: No aggressive osseous lesion. Spinal degenerative   changes.    LOWER NECK: Unremarkable.    IMPRESSION:     1.  Right lower lobe pneumonia. Recommend follow-up in 3-4 weeks after   treatment.    2.  The main pulmonary artery is mildly dilated. Recommend correlation   with echocardiogram.    < end of copied text >      < from: CT Abdomen and Pelvis No Cont (18 @ 14:08) >    FINDINGS:    LOWER CHEST: Increasing patchy right basilar opacities concerning for   pneumonia. Coronary calcifications.    LIVER: Within normal limits.  BILE DUCTS: Normal caliber.  GALLBLADDER: Within normal limits.  SPLEEN: Within normal limits.  PANCREAS: Redemonstrated pancreatic tail cysts measuring up to 2.0 cm. No   pancreatic ductal dilatation.  ADRENALS: Within normal limits.  KIDNEYS/URETERS: No hydronephrosis. A few small left renal calcifications.    BLADDER: Within normal limits.  REPRODUCTIVE ORGANS: Hysterectomy. No adnexal masses.    BOWEL: No bowel obstruction. Left lower quadrant colostomy.  PERITONEUM: No ascites or intra-abdominal collection.  VESSELS:  Atherosclerotic calcifications.  RETROPERITONEUM: No lymphadenopathy.    ABDOMINAL WALL: Left lower quadrant ostomy. Postsurgical changes of the   anterior abdominal wall.  BONES: Degenerative changes. Right iliac fixation screw and pubic   symphysis orthopedic hardware.    IMPRESSION:     Worsening opacity in the right lower lobe consistent with pneumonia.    No acute intra-abdominal pathology or collection.    < end of copied text >

## 2018-08-16 NOTE — PROGRESS NOTE ADULT - ASSESSMENT
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

## 2018-08-16 NOTE — PROGRESS NOTE ADULT - PROBLEM SELECTOR PLAN 2
abnormal sputum in past--on empiric ABX-last admit pseudomonas present  ID evaln--re-cx sputum and afb x3 (?nathaniel)

## 2018-08-16 NOTE — PROGRESS NOTE ADULT - PROBLEM SELECTOR PLAN 1
ct scan showing worsening opacity in RLL, ct chest showing possibility of DIEUDONNE infection, tree in bud opacities  c/w cefepime 2gm IV q8hr (8/14- and vancomycin 1gm q12hr ( 8/15-,   urine legionella neg can d/c azithromycin   c/w chest pt , acapella, duonebs, aspiration precautions  appreciate pulm recs  afb x 3  repeat sputum bcx  bcx ngtd  check vanc trough before 4th dose

## 2018-08-16 NOTE — PROGRESS NOTE ADULT - SUBJECTIVE AND OBJECTIVE BOX
Surgery Progress Note    S: Patient seen and examined. No acute events overnight.     O:  Vital Signs Last 24 Hrs  T(C): 37.2 (16 Aug 2018 12:46), Max: 37.6 (16 Aug 2018 05:19)  T(F): 98.9 (16 Aug 2018 12:46), Max: 99.6 (16 Aug 2018 05:19)  HR: 84 (16 Aug 2018 12:46) (77 - 84)  BP: 138/72 (16 Aug 2018 12:46) (115/68 - 154/70)  BP(mean): --  RR: 18 (16 Aug 2018 12:46) (18 - 18)  SpO2: 94% (16 Aug 2018 12:46) (93% - 95%)    I&O's Detail    15 Aug 2018 07:01  -  16 Aug 2018 07:00  --------------------------------------------------------  IN:    IV PiggyBack: 100 mL    Oral Fluid: 760 mL  Total IN: 860 mL    OUT:    Voided: 800 mL  Total OUT: 800 mL    Total NET: 60 mL      16 Aug 2018 07:01  -  16 Aug 2018 14:06  --------------------------------------------------------  IN:    Oral Fluid: 760 mL  Total IN: 760 mL    OUT:    Voided: 1150 mL  Total OUT: 1150 mL    Total NET: -390 mL          MEDICATIONS  (STANDING):  ALBUTerol/ipratropium for Nebulization 3 milliLiter(s) Nebulizer every 6 hours  apixaban 5 milliGRAM(s) Oral every 12 hours  buDESOnide 160 MICROgram(s)/formoterol 4.5 MICROgram(s) Inhaler 2 Puff(s) Inhalation two times a day  cefepime   IVPB 2000 milliGRAM(s) IV Intermittent every 8 hours  chlorhexidine 4% Liquid 1 Application(s) Topical <User Schedule>  dextrose 5%. 1000 milliLiter(s) (50 mL/Hr) IV Continuous <Continuous>  dextrose 50% Injectable 12.5 Gram(s) IV Push once  dextrose 50% Injectable 25 Gram(s) IV Push once  dextrose 50% Injectable 25 Gram(s) IV Push once  digoxin     Tablet 0.25 milliGRAM(s) Oral daily  docusate sodium 100 milliGRAM(s) Oral three times a day  fluconAZOLE   Tablet 100 milliGRAM(s) Oral daily  insulin glargine Injectable (LANTUS) 11 Unit(s) SubCutaneous at bedtime  insulin lispro (HumaLOG) corrective regimen sliding scale   SubCutaneous three times a day before meals  insulin lispro (HumaLOG) corrective regimen sliding scale   SubCutaneous at bedtime  insulin lispro Injectable (HumaLOG) 3 Unit(s) SubCutaneous three times a day before meals  methylPREDNISolone 4 milliGRAM(s) Oral daily  montelukast 10 milliGRAM(s) Oral daily  pantoprazole    Tablet 40 milliGRAM(s) Oral before breakfast  polyethylene glycol 3350 17 Gram(s) Oral every 12 hours  senna 2 Tablet(s) Oral at bedtime  vancomycin  IVPB 1000 milliGRAM(s) IV Intermittent every 12 hours  vancomycin  IVPB        MEDICATIONS  (PRN):  acetaminophen   Tablet 650 milliGRAM(s) Oral every 6 hours PRN Moderate Pain (4 - 6)  dextrose 40% Gel 15 Gram(s) Oral once PRN Blood Glucose LESS THAN 70 milliGRAM(s)/deciliter  glucagon  Injectable 1 milliGRAM(s) IntraMuscular once PRN Glucose LESS THAN 70 milligrams/deciliter  simethicone 80 milliGRAM(s) Chew four times a day PRN Gas                            9.5    10.51 )-----------( 422      ( 16 Aug 2018 07:38 )             30.9       08-16    136  |  98  |  7   ----------------------------<  170<H>  3.6   |  28  |  0.62    Ca    8.6      16 Aug 2018 07:15  Mg     1.6     08-16    TPro  6.5  /  Alb  3.0<L>  /  TBili  0.2  /  DBili  x   /  AST  13  /  ALT  10  /  AlkPhos  87  08-15      Physical Exam:  Gen: Laying in bed, NAD  Resp: Unlabored breathing  Abd: soft, non-tender, mildly distended. Well-healed incisions. Ostomy pink/viable with gas in bag, some mucus around stoma.  Ext: WWP  Skin: No rashes

## 2018-08-16 NOTE — PROGRESS NOTE ADULT - PROBLEM SELECTOR PLAN 3
- TTE 5/18 reviwed, patient has moderate Aortic insufficiency with EF 63%  - hemodynamically stable at this time - pt has history of paraxoysmal Afib  - continue with eliquis 5 mg bid  - continue with digoxin. 25 mg daily

## 2018-08-16 NOTE — DIETITIAN INITIAL EVALUATION ADULT. - ORAL INTAKE PTA
poor/2 small meals/day, confirmed shellfish allergy, no nutrition supplements PTA, receiving vit C and vit D3 supplements at rehab PTA

## 2018-08-16 NOTE — PROGRESS NOTE ADULT - ATTENDING COMMENTS
Dr. Allison to follow as above--  asthma, TBM, chronic bronchitis---?new PNA vs UTI (? DIEUDONNE)  F/up all cx--re send sputum for C and S and AFB x3 days; continue cefepiime/vanco (covers pseudomonas)--ID luis (Afshan Stout et al)  Suirg-[id number]-HJ-on AC  PT needed    Eulalio Allison MD-Pulmonary   [phone]

## 2018-08-16 NOTE — PROGRESS NOTE ADULT - PROBLEM SELECTOR PLAN 8
Diet: carb consistent  VTE: eliquis 5 mg bid  PT eval pending, wound care consult pending for anal wound - aiss   - A1c in morning  - lantus 11qhs increased from 8u last night, monitor

## 2018-08-16 NOTE — DIETITIAN INITIAL EVALUATION ADULT. - OTHER INFO
Pt seen for nutrition consult: assessment. Pt observed in bed, sitting up, alert and oriented. Pt reports decreased po intake >1 month. No N + V reported, no chewing or swallowing difficulties reported. Pt c/o constipation and low ostomy output, bowel regimen is in place. Pt reports wt loss, >25 pounds over the course of the past year. Pt reports her current wt is 144 pounds, which would reflect a 9 pound wt loss since her recent admit 3 weeks ago (7/24/17). Per EMR, pt current wt is 139 pounds, reflecting a 16 pound wt loss since last admit. Pt is amenable to glucerna supplementation. Discussed food preferences with pt.

## 2018-08-16 NOTE — DIETITIAN INITIAL EVALUATION ADULT. - ENERGY NEEDS
Ht: 67" Wt: 139# BMI: 21.8 IBW: 135 %IBW: 103%  No edema noted. No pressure ulcers noted.  Other objective information: Pt admitted from rehab for UTI and PNA. 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 Ht: 67" Wt: 139 pounds BMI: 21.8 IBW: 135 pounds +/- 10% IBW%: 103%  No edema noted. No pressure ulcers noted.  Other objective information: Pt admitted from rehab for UTI and PNA. 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

## 2018-08-16 NOTE — PROGRESS NOTE ADULT - ASSESSMENT
Assessment  67F s/p APR on 7/24 for rectal cancer, discharged to rehab, now admitted for pneumonia and UTI.    Plan:  - Care of UTI and pneumonia per medicine and pulmonary  - Continue current bowel regimen (senna, colace, miralax)  - Please record ostomy output  - PT wound care consult for vac to perineal wound  - Colorectal surgery will follow    Scott Sellers, PGY-2  [facility] [phone]

## 2018-08-16 NOTE — PROGRESS NOTE ADULT - PROBLEM SELECTOR PLAN 7
- aiss   - A1c in morning  - lantus 11qhs increased from 8u last night, monitor - hold bp meds for now as bps have been in the 100-120 systolic range

## 2018-08-16 NOTE — PROGRESS NOTE ADULT - PROBLEM SELECTOR PLAN 2
- pt has history of paraxoysmal Afib  - continue with eliquis 5 mg bid  - continue with digoxin. 25 mg daily yeast in u/a  urine cx ordered  c/w diflucan 100mg daily (8/15-

## 2018-08-16 NOTE — DIETITIAN INITIAL EVALUATION ADULT. - SIGNS/SYMPTOMS
NFPE findings noted above, 16 pound significant wt loss [id number] (10.3%) NFPE findings noted above, 16 pound (10.3%) significant wt loss n5mabkg

## 2018-08-16 NOTE — PROGRESS NOTE ADULT - SUBJECTIVE AND OBJECTIVE BOX
Patient is a 67y old  Female who presents with a chief complaint of UTI (14 Aug 2018 21:17)      SUBJECTIVE / OVERNIGHT EVENTS:    patient seen and examined. pain improving, as is sputum.  no fevers, chills, sob.  passing gas, no BM yet    ROS:  14 point ROS negative in detail except stated as above    MEDICATIONS  (STANDING):  ALBUTerol/ipratropium for Nebulization 3 milliLiter(s) Nebulizer every 6 hours  apixaban 5 milliGRAM(s) Oral every 12 hours  buDESOnide 160 MICROgram(s)/formoterol 4.5 MICROgram(s) Inhaler 2 Puff(s) Inhalation two times a day  cefepime   IVPB 2000 milliGRAM(s) IV Intermittent every 8 hours  chlorhexidine 4% Liquid 1 Application(s) Topical <User Schedule>  dextrose 5%. 1000 milliLiter(s) (50 mL/Hr) IV Continuous <Continuous>  dextrose 50% Injectable 12.5 Gram(s) IV Push once  dextrose 50% Injectable 25 Gram(s) IV Push once  dextrose 50% Injectable 25 Gram(s) IV Push once  digoxin     Tablet 0.25 milliGRAM(s) Oral daily  docusate sodium 100 milliGRAM(s) Oral three times a day  fluconAZOLE   Tablet 100 milliGRAM(s) Oral daily  insulin glargine Injectable (LANTUS) 11 Unit(s) SubCutaneous at bedtime  insulin lispro (HumaLOG) corrective regimen sliding scale   SubCutaneous three times a day before meals  insulin lispro (HumaLOG) corrective regimen sliding scale   SubCutaneous at bedtime  lactulose Syrup 10 Gram(s) Oral once  methylPREDNISolone 4 milliGRAM(s) Oral daily  montelukast 10 milliGRAM(s) Oral daily  pantoprazole    Tablet 40 milliGRAM(s) Oral before breakfast  polyethylene glycol 3350 17 Gram(s) Oral every 12 hours  senna 2 Tablet(s) Oral at bedtime  vancomycin  IVPB 1000 milliGRAM(s) IV Intermittent every 12 hours  vancomycin  IVPB        MEDICATIONS  (PRN):  acetaminophen   Tablet 650 milliGRAM(s) Oral every 6 hours PRN Moderate Pain (4 - 6)  dextrose 40% Gel 15 Gram(s) Oral once PRN Blood Glucose LESS THAN 70 milliGRAM(s)/deciliter  glucagon  Injectable 1 milliGRAM(s) IntraMuscular once PRN Glucose LESS THAN 70 milligrams/deciliter  simethicone 80 milliGRAM(s) Chew four times a day PRN Gas      CAPILLARY BLOOD GLUCOSE      POCT Blood Glucose.: 208 mg/dL (16 Aug 2018 08:07)  POCT Blood Glucose.: 167 mg/dL (15 Aug 2018 22:04)  POCT Blood Glucose.: 298 mg/dL (15 Aug 2018 16:55)  POCT Blood Glucose.: 279 mg/dL (15 Aug 2018 12:04)    I&O's Summary    15 Aug 2018 07:01  -  16 Aug 2018 07:00  --------------------------------------------------------  IN: 860 mL / OUT: 800 mL / NET: 60 mL        PHYSICAL EXAM:  Vital Signs Last 24 Hrs  T(C): 37.6 (16 Aug 2018 05:19), Max: 37.6 (16 Aug 2018 05:19)  T(F): 99.6 (16 Aug 2018 05:19), Max: 99.6 (16 Aug 2018 05:19)  HR: 77 (16 Aug 2018 05:19) (77 - 83)  BP: 154/70 (16 Aug 2018 05:19) (115/68 - 154/70)  BP(mean): --  RR: 18 (16 Aug 2018 05:19) (18 - 18)  SpO2: 93% (16 Aug 2018 05:19) (93% - 95%)     General: NAD  Eyes: PERRL/ EOMI  ENT/Neck: Neck supple, trachea midline, No JVD  Respiratory: b/l rhonchi  CV: S1S2 RRR no murmurs, rubs or gallops  Abdominal: Soft, NT, ND +BS, colostomy bag in place c/d/i.  Extremities: No edema, + 2 peripheral pulses b/l   Skin: No Rashes, Hematoma, Ecchymosis      LABS:                        9.5    10.51 )-----------( 422      ( 16 Aug 2018 07:38 )             30.9     08-16    136  |  98  |  7   ----------------------------<  170<H>  3.6   |  28  |  0.62    Ca    8.6      16 Aug 2018 07:15  Mg     1.6     08-16    TPro  6.5  /  Alb  3.0<L>  /  TBili  0.2  /  DBili  x   /  AST  13  /  ALT  10  /  AlkPhos  87  08-15    PT/INR - ( 15 Aug 2018 07:29 )   PT: 16.7 sec;   INR: 1.47 ratio         PTT - ( 14 Aug 2018 13:09 )  PTT:33.7 sec      Urinalysis Basic - ( 14 Aug 2018 14:05 )    Color: Orange / Appearance: SL Turbid / S.008 / pH: x  Gluc: x / Ketone: Negative  / Bili: Negative / Urobili: Negative   Blood: x / Protein: Trace / Nitrite: Positive   Leuk Esterase: Large / RBC: 0-2 /HPF / WBC >50 /HPF   Sq Epi: x / Non Sq Epi: Occasional /HPF / Bacteria: Few /HPF        RADIOLOGY & ADDITIONAL TESTS:    Imaging Personally Reviewed:    Consultant(s) Notes Reviewed:    id, pulm  Care Discussed with Consultants/Other Providers:  yissel voss

## 2018-08-16 NOTE — PROGRESS NOTE ADULT - PROBLEM SELECTOR PLAN 9
GI prophylaxis:  PPI pre breakfast  aspiration precautions-all meals are to OOB as able  PT evaluation  early ambulation  incentive spirometry

## 2018-08-16 NOTE — DIETITIAN INITIAL EVALUATION ADULT. - ADHERENCE
follows consistent carb diet/n/a 4 no specific therapeutic diet followed, po intake has been low in general. Pt noted with T2DM. Last HgbA1c 7.4% (7/18/2018)./n/a

## 2018-08-16 NOTE — PROGRESS NOTE ADULT - SUBJECTIVE AND OBJECTIVE BOX
CHIEF COMPLAINT: f/up for PNA, TBM, asthma-chronic bronchitis, allergies--    Interval Events:    REVIEW OF SYSTEMS:  Constitutional: No fevers or chills. No weight loss. No fatigue or generalized malaise.  Eyes: No itching or discharge from the eyes  ENT: No ear pain. No ear discharge. No nasal congestion. No post nasal drip. No epistaxis. No throat pain. No sore throat. No difficulty swallowing.   CV: No chest pain. No palpitations. No lightheadedness or dizziness.   Resp: No dyspnea at rest. No dyspnea on exertion. No orthopnea. No wheezing. No cough. No stridor. No sputum production. No chest pain with respiration.  GI: No nausea. No vomiting. No diarrhea.  MSK: No joint pain or pain in any extremities  Integumentary: No skin lesions. No pedal edema.  Neurological: No gross motor weakness. No sensory changes.  [ ] All other systems negative  [ ] Unable to assess ROS because ________    OBJECTIVE:  ICU Vital Signs Last 24 Hrs  T(C): 37.6 (16 Aug 2018 05:19), Max: 37.6 (16 Aug 2018 05:19)  T(F): 99.6 (16 Aug 2018 05:19), Max: 99.6 (16 Aug 2018 05:19)  HR: 77 (16 Aug 2018 05:19) (77 - 83)  BP: 154/70 (16 Aug 2018 05:19) (115/68 - 154/70)  BP(mean): --  ABP: --  ABP(mean): --  RR: 18 (16 Aug 2018 05:19) (18 - 18)  SpO2: 93% (16 Aug 2018 05:19) (93% - 95%)         @ 07:  -  15 @ 07:00  --------------------------------------------------------  IN: 270 mL / OUT: 500 mL / NET: -230 mL    08-15 @ 07:  -  16 @ 05:30  --------------------------------------------------------  IN: 860 mL / OUT: 800 mL / NET: 60 mL      CAPILLARY BLOOD GLUCOSE      POCT Blood Glucose.: 167 mg/dL (15 Aug 2018 22:04)      PHYSICAL EXAM:  General: Awake, alert, oriented X 3.   HEENT: Atraumatic, normocephalic.                 Mallampatti Grade                 No nasal congestion.                No tonsillar or pharyngeal exudates.  Lymph Nodes: No palpable lymphadenopathy  Neck: No JVD. No carotid bruit.   Respiratory: Normal chest expansion                         Normal percussion                         Normal and equal air entry                         No wheeze, rhonchi or rales.  Cardiovascular: S1 S2 normal. No murmurs, rubs or gallops.   Abdomen: Soft, non-tender, non-distended. No organomegaly.  Extremities: Warm to touch. Peripheral pulse palpable. No pedal edema.   Skin: No rashes or skin lesions  Neurological: Motor and sensory examination equal and normal in all four extremities.  Psychiatry: Appropriate mood and affect.    HOSPITAL MEDICATIONS:  MEDICATIONS  (STANDING):  ALBUTerol/ipratropium for Nebulization 3 milliLiter(s) Nebulizer every 6 hours  apixaban 5 milliGRAM(s) Oral every 12 hours  buDESOnide 160 MICROgram(s)/formoterol 4.5 MICROgram(s) Inhaler 2 Puff(s) Inhalation two times a day  cefepime   IVPB 2000 milliGRAM(s) IV Intermittent every 8 hours  chlorhexidine 4% Liquid 1 Application(s) Topical <User Schedule>  dextrose 5%. 1000 milliLiter(s) (50 mL/Hr) IV Continuous <Continuous>  dextrose 50% Injectable 12.5 Gram(s) IV Push once  dextrose 50% Injectable 25 Gram(s) IV Push once  dextrose 50% Injectable 25 Gram(s) IV Push once  digoxin     Tablet 0.25 milliGRAM(s) Oral daily  docusate sodium 100 milliGRAM(s) Oral three times a day  fluconAZOLE   Tablet 100 milliGRAM(s) Oral daily  insulin glargine Injectable (LANTUS) 8 Unit(s) SubCutaneous at bedtime  insulin lispro (HumaLOG) corrective regimen sliding scale   SubCutaneous three times a day before meals  insulin lispro (HumaLOG) corrective regimen sliding scale   SubCutaneous at bedtime  methylPREDNISolone 4 milliGRAM(s) Oral daily  montelukast 10 milliGRAM(s) Oral daily  pantoprazole    Tablet 40 milliGRAM(s) Oral before breakfast  polyethylene glycol 3350 17 Gram(s) Oral every 12 hours  senna 2 Tablet(s) Oral at bedtime  vancomycin  IVPB 1000 milliGRAM(s) IV Intermittent every 12 hours  vancomycin  IVPB      vancomycin  IVPB 1000 milliGRAM(s) IV Intermittent every 12 hours    MEDICATIONS  (PRN):  acetaminophen   Tablet 650 milliGRAM(s) Oral every 6 hours PRN Moderate Pain (4 - 6)  dextrose 40% Gel 15 Gram(s) Oral once PRN Blood Glucose LESS THAN 70 milliGRAM(s)/deciliter  glucagon  Injectable 1 milliGRAM(s) IntraMuscular once PRN Glucose LESS THAN 70 milligrams/deciliter  oxyCODONE    5 mG/acetaminophen 325 mG 1 Tablet(s) Oral every 6 hours PRN Severe Pain (7 - 10)      LABS:                        9.4    9.77  )-----------( 432      ( 15 Aug 2018 07:28 )             30.9     08-15    140  |  99  |  9   ----------------------------<  157<H>  3.9   |  29  |  0.67    Ca    8.7      15 Aug 2018 06:38    TPro  6.5  /  Alb  3.0<L>  /  TBili  0.2  /  DBili  x   /  AST  13  /  ALT  10  /  AlkPhos  87  08-15    PT/INR - ( 15 Aug 2018 07:29 )   PT: 16.7 sec;   INR: 1.47 ratio         PTT - ( 14 Aug 2018 13:09 )  PTT:33.7 sec  Urinalysis Basic - ( 14 Aug 2018 14:05 )    Color: Orange / Appearance: SL Turbid / S.008 / pH: x  Gluc: x / Ketone: Negative  / Bili: Negative / Urobili: Negative   Blood: x / Protein: Trace / Nitrite: Positive   Leuk Esterase: Large / RBC: 0-2 /HPF / WBC >50 /HPF   Sq Epi: x / Non Sq Epi: Occasional /HPF / Bacteria: Few /HPF            MICROBIOLOGY:     RADIOLOGY:   < from: CT Chest No Cont (08.15.18 @ 18:08) >  INTERPRETATION:  Patchy tree-in-bud opacity in the right lower lobe,   increased in extent from the prior examination of 2018. There are   faint, groundglass opacities scattered throughout the right lung.   Scattered areas of distal mucoid airway impaction. Mild bilateral lower   lobe bronchiectasis. Findings are suggestive of chronic DIEUDONNE infection,   however superimposed right lower lobe pneumonia is not excluded.    Exophytic cystic lesions arising from the pancreatic tail, unchanged.    < end of copied text >    [ ] Reviewed and interpreted by me    Point of Care Ultrasound Findings:    PFT:    EKG: CHIEF COMPLAINT: f/up for PNA, TBM, asthma-chronic bronchitis, allergies--better before--abdom discofort--mild cough present--some productive sputum-yellowish    Interval Events: CT chest done    REVIEW OF SYSTEMS:  Constitutional: No fevers or chills. No weight loss. + fatigue or generalized malaise.  Eyes: No itching or discharge from the eyes  ENT: No ear pain. No ear discharge. No nasal congestion. No post nasal drip. No epistaxis. No throat pain. No sore throat. No difficulty swallowing.   CV: No chest pain. No palpitations. No lightheadedness or dizziness.   Resp: No dyspnea at rest. + dyspnea on exertion. No orthopnea. No wheezing. + cough. No stridor. + sputum production. No chest pain with respiration.  GI: No nausea. No vomiting. No diarrhea.  MSK: No joint pain or pain in any extremities  Integumentary: No skin lesions. No pedal edema.  Neurological: No gross motor weakness. No sensory changes.  [+ ] All other systems negative  [ ] Unable to assess ROS because ________    OBJECTIVE:  ICU Vital Signs Last 24 Hrs  T(C): 37.6 (16 Aug 2018 05:19), Max: 37.6 (16 Aug 2018 05:19)  T(F): 99.6 (16 Aug 2018 05:19), Max: 99.6 (16 Aug 2018 05:19)  HR: 77 (16 Aug 2018 05:19) (77 - 83)  BP: 154/70 (16 Aug 2018 05:19) (115/68 - 154/70)  BP(mean): --  ABP: --  ABP(mean): --  RR: 18 (16 Aug 2018 05:19) (18 - 18)  SpO2: 93% (16 Aug 2018 05:19) (93% - 95%)        14 @ 07:  -  -15 @ 07:00  --------------------------------------------------------  IN: 270 mL / OUT: 500 mL / NET: -230 mL    08-15 @ 07:  -  -16 @ 05:30  --------------------------------------------------------  IN: 860 mL / OUT: 800 mL / NET: 60 mL      CAPILLARY BLOOD GLUCOSE      POCT Blood Glucose.: 167 mg/dL (15 Aug 2018 22:04)      PHYSICAL EXAM: NAD in bed  General: Awake, alert, oriented X 3.   HEENT: Atraumatic, normocephalic.                 Mallampatti Grade 2                No nasal congestion.                No tonsillar or pharyngeal exudates.  Lymph Nodes: No palpable lymphadenopathy  Neck: No JVD. No carotid bruit.   Respiratory: Normal chest expansion                         Normal percussion                         Normal and equal air entry                         No wheeze, rhonchi but mild R basilar rales.  Cardiovascular: S1 S2 normal. No murmurs, rubs or gallops.   Abdomen: Soft, non-tender, non-distended. No organomegaly. + BS  Extremities: Warm to touch. Peripheral pulse palpable. No pedal edema.   Skin: No rashes or skin lesions  Neurological: Motor and sensory examination equal and normal in all four extremities.  Psychiatry: Appropriate mood and affect.    HOSPITAL MEDICATIONS:  MEDICATIONS  (STANDING):  ALBUTerol/ipratropium for Nebulization 3 milliLiter(s) Nebulizer every 6 hours  apixaban 5 milliGRAM(s) Oral every 12 hours  buDESOnide 160 MICROgram(s)/formoterol 4.5 MICROgram(s) Inhaler 2 Puff(s) Inhalation two times a day  cefepime   IVPB 2000 milliGRAM(s) IV Intermittent every 8 hours  chlorhexidine 4% Liquid 1 Application(s) Topical <User Schedule>  dextrose 5%. 1000 milliLiter(s) (50 mL/Hr) IV Continuous <Continuous>  dextrose 50% Injectable 12.5 Gram(s) IV Push once  dextrose 50% Injectable 25 Gram(s) IV Push once  dextrose 50% Injectable 25 Gram(s) IV Push once  digoxin     Tablet 0.25 milliGRAM(s) Oral daily  docusate sodium 100 milliGRAM(s) Oral three times a day  fluconAZOLE   Tablet 100 milliGRAM(s) Oral daily  insulin glargine Injectable (LANTUS) 8 Unit(s) SubCutaneous at bedtime  insulin lispro (HumaLOG) corrective regimen sliding scale   SubCutaneous three times a day before meals  insulin lispro (HumaLOG) corrective regimen sliding scale   SubCutaneous at bedtime  methylPREDNISolone 4 milliGRAM(s) Oral daily  montelukast 10 milliGRAM(s) Oral daily  pantoprazole    Tablet 40 milliGRAM(s) Oral before breakfast  polyethylene glycol 3350 17 Gram(s) Oral every 12 hours  senna 2 Tablet(s) Oral at bedtime  vancomycin  IVPB 1000 milliGRAM(s) IV Intermittent every 12 hours  vancomycin  IVPB      vancomycin  IVPB 1000 milliGRAM(s) IV Intermittent every 12 hours    MEDICATIONS  (PRN):  acetaminophen   Tablet 650 milliGRAM(s) Oral every 6 hours PRN Moderate Pain (4 - 6)  dextrose 40% Gel 15 Gram(s) Oral once PRN Blood Glucose LESS THAN 70 milliGRAM(s)/deciliter  glucagon  Injectable 1 milliGRAM(s) IntraMuscular once PRN Glucose LESS THAN 70 milligrams/deciliter  oxyCODONE    5 mG/acetaminophen 325 mG 1 Tablet(s) Oral every 6 hours PRN Severe Pain (7 - 10)      LABS:                        9.4    9.77  )-----------( 432      ( 15 Aug 2018 07:28 )             30.9     08-15    140  |  99  |  9   ----------------------------<  157<H>  3.9   |  29  |  0.67    Ca    8.7      15 Aug 2018 06:38    TPro  6.5  /  Alb  3.0<L>  /  TBili  0.2  /  DBili  x   /  AST  13  /  ALT  10  /  AlkPhos  87  08-15    PT/INR - ( 15 Aug 2018 07:29 )   PT: 16.7 sec;   INR: 1.47 ratio         PTT - ( 14 Aug 2018 13:09 )  PTT:33.7 sec  Urinalysis Basic - ( 14 Aug 2018 14:05 )    Color: Orange / Appearance: SL Turbid / S.008 / pH: x  Gluc: x / Ketone: Negative  / Bili: Negative / Urobili: Negative   Blood: x / Protein: Trace / Nitrite: Positive   Leuk Esterase: Large / RBC: 0-2 /HPF / WBC >50 /HPF   Sq Epi: x / Non Sq Epi: Occasional /HPF / Bacteria: Few /HPF            MICROBIOLOGY:     RADIOLOGY:   < from: CT Chest No Cont (08.15.18 @ 18:08) >  INTERPRETATION:  Patchy tree-in-bud opacity in the right lower lobe,   increased in extent from the prior examination of 2018. There are   faint, groundglass opacities scattered throughout the right lung.   Scattered areas of distal mucoid airway impaction. Mild bilateral lower   lobe bronchiectasis. Findings are suggestive of chronic DIEUDONNE infection,   however superimposed right lower lobe pneumonia is not excluded.    Exophytic cystic lesions arising from the pancreatic tail, unchanged.    < end of copied text >    [ ] Reviewed and interpreted by me    Point of Care Ultrasound Findings:    PFT:    EKG:

## 2018-08-16 NOTE — PROGRESS NOTE ADULT - PROBLEM SELECTOR PLAN 6
- hold bp meds for now as bps have been in the 100-120 systolic range c/w senna, colace, miralax  no ostomy output in few days  lactulose x2  ordered abd xray to r/o partial obstruction, f/u  f/u crs recs

## 2018-08-16 NOTE — PROGRESS NOTE ADULT - PROBLEM SELECTOR PLAN 9
Diet: carb consistent  VTE: eliquis 5 mg bid  PT eval pending, wound care consult pending for anal wound

## 2018-08-17 LAB
ANION GAP SERPL CALC-SCNC: 14 MMOL/L — SIGNIFICANT CHANGE UP (ref 5–17)
BUN SERPL-MCNC: 8 MG/DL — SIGNIFICANT CHANGE UP (ref 7–23)
CALCIUM SERPL-MCNC: 9 MG/DL — SIGNIFICANT CHANGE UP (ref 8.4–10.5)
CHLORIDE SERPL-SCNC: 95 MMOL/L — LOW (ref 96–108)
CO2 SERPL-SCNC: 27 MMOL/L — SIGNIFICANT CHANGE UP (ref 22–31)
CREAT SERPL-MCNC: 0.55 MG/DL — SIGNIFICANT CHANGE UP (ref 0.5–1.3)
GLUCOSE BLDC GLUCOMTR-MCNC: 160 MG/DL — HIGH (ref 70–99)
GLUCOSE BLDC GLUCOMTR-MCNC: 189 MG/DL — HIGH (ref 70–99)
GLUCOSE BLDC GLUCOMTR-MCNC: 212 MG/DL — HIGH (ref 70–99)
GLUCOSE BLDC GLUCOMTR-MCNC: 247 MG/DL — HIGH (ref 70–99)
GLUCOSE BLDC GLUCOMTR-MCNC: 256 MG/DL — HIGH (ref 70–99)
GLUCOSE BLDC GLUCOMTR-MCNC: 300 MG/DL — HIGH (ref 70–99)
GLUCOSE SERPL-MCNC: 191 MG/DL — HIGH (ref 70–99)
HCT VFR BLD CALC: 33.1 % — LOW (ref 34.5–45)
HGB BLD-MCNC: 9.9 G/DL — LOW (ref 11.5–15.5)
MAGNESIUM SERPL-MCNC: 1.6 MG/DL — SIGNIFICANT CHANGE UP (ref 1.6–2.6)
MCHC RBC-ENTMCNC: 21.9 PG — LOW (ref 27–34)
MCHC RBC-ENTMCNC: 29.9 GM/DL — LOW (ref 32–36)
MCV RBC AUTO: 73.2 FL — LOW (ref 80–100)
NIGHT BLUE STAIN TISS: SIGNIFICANT CHANGE UP
PLATELET # BLD AUTO: 459 K/UL — HIGH (ref 150–400)
POTASSIUM SERPL-MCNC: 3.7 MMOL/L — SIGNIFICANT CHANGE UP (ref 3.5–5.3)
POTASSIUM SERPL-SCNC: 3.7 MMOL/L — SIGNIFICANT CHANGE UP (ref 3.5–5.3)
RBC # BLD: 4.52 M/UL — SIGNIFICANT CHANGE UP (ref 3.8–5.2)
RBC # FLD: 16.1 % — HIGH (ref 10.3–14.5)
SODIUM SERPL-SCNC: 136 MMOL/L — SIGNIFICANT CHANGE UP (ref 135–145)
SPECIMEN SOURCE: SIGNIFICANT CHANGE UP
VANCOMYCIN TROUGH SERPL-MCNC: 7 UG/ML — LOW (ref 10–20)
WBC # BLD: 11.34 K/UL — HIGH (ref 3.8–10.5)
WBC # FLD AUTO: 11.34 K/UL — HIGH (ref 3.8–10.5)

## 2018-08-17 PROCEDURE — 99233 SBSQ HOSP IP/OBS HIGH 50: CPT

## 2018-08-17 PROCEDURE — 74018 RADEX ABDOMEN 1 VIEW: CPT | Mod: 26

## 2018-08-17 PROCEDURE — 74176 CT ABD & PELVIS W/O CONTRAST: CPT | Mod: 26

## 2018-08-17 PROCEDURE — 99232 SBSQ HOSP IP/OBS MODERATE 35: CPT

## 2018-08-17 RX ORDER — INSULIN GLARGINE 100 [IU]/ML
12 INJECTION, SOLUTION SUBCUTANEOUS AT BEDTIME
Qty: 0 | Refills: 0 | Status: DISCONTINUED | OUTPATIENT
Start: 2018-08-17 | End: 2018-08-23

## 2018-08-17 RX ORDER — LACTULOSE 10 G/15ML
20 SOLUTION ORAL ONCE
Qty: 0 | Refills: 0 | Status: COMPLETED | OUTPATIENT
Start: 2018-08-17 | End: 2018-08-17

## 2018-08-17 RX ORDER — INSULIN LISPRO 100/ML
4 VIAL (ML) SUBCUTANEOUS
Qty: 0 | Refills: 0 | Status: DISCONTINUED | OUTPATIENT
Start: 2018-08-17 | End: 2018-08-20

## 2018-08-17 RX ORDER — TRAMADOL HYDROCHLORIDE 50 MG/1
25 TABLET ORAL ONCE
Qty: 0 | Refills: 0 | Status: DISCONTINUED | OUTPATIENT
Start: 2018-08-17 | End: 2018-08-17

## 2018-08-17 RX ORDER — SIMETHICONE 80 MG/1
160 TABLET, CHEWABLE ORAL
Qty: 0 | Refills: 0 | Status: DISCONTINUED | OUTPATIENT
Start: 2018-08-17 | End: 2018-08-23

## 2018-08-17 RX ORDER — MULTIVIT WITH MIN/MFOLATE/K2 340-15/3 G
300 POWDER (GRAM) ORAL ONCE
Qty: 0 | Refills: 0 | Status: COMPLETED | OUTPATIENT
Start: 2018-08-17 | End: 2018-08-17

## 2018-08-17 RX ORDER — PHENAZOPYRIDINE HCL 100 MG
100 TABLET ORAL EVERY 8 HOURS
Qty: 0 | Refills: 0 | Status: COMPLETED | OUTPATIENT
Start: 2018-08-17 | End: 2018-08-19

## 2018-08-17 RX ORDER — TRAMADOL HYDROCHLORIDE 50 MG/1
25 TABLET ORAL EVERY 8 HOURS
Qty: 0 | Refills: 0 | Status: DISCONTINUED | OUTPATIENT
Start: 2018-08-17 | End: 2018-08-18

## 2018-08-17 RX ORDER — MAGNESIUM SULFATE 500 MG/ML
2 VIAL (ML) INJECTION ONCE
Qty: 0 | Refills: 0 | Status: COMPLETED | OUTPATIENT
Start: 2018-08-17 | End: 2018-08-17

## 2018-08-17 RX ADMIN — SIMETHICONE 160 MILLIGRAM(S): 80 TABLET, CHEWABLE ORAL at 15:50

## 2018-08-17 RX ADMIN — Medication 10 MILLIGRAM(S): at 14:34

## 2018-08-17 RX ADMIN — SIMETHICONE 160 MILLIGRAM(S): 80 TABLET, CHEWABLE ORAL at 12:02

## 2018-08-17 RX ADMIN — Medication 10 MILLIGRAM(S): at 08:24

## 2018-08-17 RX ADMIN — TRAMADOL HYDROCHLORIDE 25 MILLIGRAM(S): 50 TABLET ORAL at 17:19

## 2018-08-17 RX ADMIN — Medication 1: at 08:21

## 2018-08-17 RX ADMIN — Medication 100 MILLIGRAM(S): at 21:26

## 2018-08-17 RX ADMIN — Medication 3: at 12:01

## 2018-08-17 RX ADMIN — SIMETHICONE 80 MILLIGRAM(S): 80 TABLET, CHEWABLE ORAL at 08:24

## 2018-08-17 RX ADMIN — Medication 300 MILLILITER(S): at 13:07

## 2018-08-17 RX ADMIN — Medication 250 MILLIGRAM(S): at 08:21

## 2018-08-17 RX ADMIN — Medication 3 MILLILITER(S): at 11:54

## 2018-08-17 RX ADMIN — Medication 10 MILLIGRAM(S): at 19:42

## 2018-08-17 RX ADMIN — Medication 3 UNIT(S): at 08:21

## 2018-08-17 RX ADMIN — Medication 250 MILLIGRAM(S): at 22:19

## 2018-08-17 RX ADMIN — CEFEPIME 100 MILLIGRAM(S): 1 INJECTION, POWDER, FOR SOLUTION INTRAMUSCULAR; INTRAVENOUS at 21:26

## 2018-08-17 RX ADMIN — SIMETHICONE 80 MILLIGRAM(S): 80 TABLET, CHEWABLE ORAL at 06:14

## 2018-08-17 RX ADMIN — CEFEPIME 100 MILLIGRAM(S): 1 INJECTION, POWDER, FOR SOLUTION INTRAMUSCULAR; INTRAVENOUS at 13:10

## 2018-08-17 RX ADMIN — CEFEPIME 100 MILLIGRAM(S): 1 INJECTION, POWDER, FOR SOLUTION INTRAMUSCULAR; INTRAVENOUS at 06:13

## 2018-08-17 RX ADMIN — LACTULOSE 20 GRAM(S): 10 SOLUTION ORAL at 09:22

## 2018-08-17 RX ADMIN — TRAMADOL HYDROCHLORIDE 25 MILLIGRAM(S): 50 TABLET ORAL at 16:19

## 2018-08-17 RX ADMIN — Medication 100 MILLIGRAM(S): at 13:09

## 2018-08-17 RX ADMIN — FLUCONAZOLE 100 MILLIGRAM(S): 150 TABLET ORAL at 11:59

## 2018-08-17 RX ADMIN — TRAMADOL HYDROCHLORIDE 25 MILLIGRAM(S): 50 TABLET ORAL at 21:18

## 2018-08-17 RX ADMIN — Medication 3 MILLILITER(S): at 23:02

## 2018-08-17 RX ADMIN — Medication 100 MILLIGRAM(S): at 06:14

## 2018-08-17 RX ADMIN — TRAMADOL HYDROCHLORIDE 25 MILLIGRAM(S): 50 TABLET ORAL at 22:10

## 2018-08-17 RX ADMIN — Medication 3 MILLILITER(S): at 18:51

## 2018-08-17 RX ADMIN — Medication 650 MILLIGRAM(S): at 13:42

## 2018-08-17 RX ADMIN — Medication 4 MILLIGRAM(S): at 06:14

## 2018-08-17 RX ADMIN — APIXABAN 5 MILLIGRAM(S): 2.5 TABLET, FILM COATED ORAL at 09:23

## 2018-08-17 RX ADMIN — SENNA PLUS 2 TABLET(S): 8.6 TABLET ORAL at 21:26

## 2018-08-17 RX ADMIN — MONTELUKAST 10 MILLIGRAM(S): 4 TABLET, CHEWABLE ORAL at 12:01

## 2018-08-17 RX ADMIN — BUDESONIDE AND FORMOTEROL FUMARATE DIHYDRATE 2 PUFF(S): 160; 4.5 AEROSOL RESPIRATORY (INHALATION) at 18:52

## 2018-08-17 RX ADMIN — INSULIN GLARGINE 12 UNIT(S): 100 INJECTION, SOLUTION SUBCUTANEOUS at 22:19

## 2018-08-17 RX ADMIN — APIXABAN 5 MILLIGRAM(S): 2.5 TABLET, FILM COATED ORAL at 21:26

## 2018-08-17 RX ADMIN — Medication 3 MILLILITER(S): at 06:14

## 2018-08-17 RX ADMIN — Medication 3 UNIT(S): at 12:01

## 2018-08-17 RX ADMIN — Medication 10 MILLIGRAM(S): at 06:22

## 2018-08-17 RX ADMIN — CHLORHEXIDINE GLUCONATE 1 APPLICATION(S): 213 SOLUTION TOPICAL at 22:19

## 2018-08-17 RX ADMIN — Medication 0.25 MILLIGRAM(S): at 06:14

## 2018-08-17 RX ADMIN — Medication 50 GRAM(S): at 10:10

## 2018-08-17 RX ADMIN — SIMETHICONE 160 MILLIGRAM(S): 80 TABLET, CHEWABLE ORAL at 21:26

## 2018-08-17 RX ADMIN — BUDESONIDE AND FORMOTEROL FUMARATE DIHYDRATE 2 PUFF(S): 160; 4.5 AEROSOL RESPIRATORY (INHALATION) at 06:14

## 2018-08-17 RX ADMIN — Medication 4 UNIT(S): at 19:27

## 2018-08-17 RX ADMIN — PANTOPRAZOLE SODIUM 40 MILLIGRAM(S): 20 TABLET, DELAYED RELEASE ORAL at 06:14

## 2018-08-17 RX ADMIN — POLYETHYLENE GLYCOL 3350 17 GRAM(S): 17 POWDER, FOR SOLUTION ORAL at 09:23

## 2018-08-17 RX ADMIN — Medication 2: at 19:25

## 2018-08-17 NOTE — PROGRESS NOTE ADULT - PROBLEM SELECTOR PLAN 2
abnormal sputum in past--on empiric ABX-last admit pseudomonas present  ID evaln--re-cx sputum and afb x3 (?nathaniel) abnormal sputum in past--on empiric ABX-last admit pseudomonas present  ID f/up evaln--re-cx sputum and afb x3 (?nathaniel)

## 2018-08-17 NOTE — PROGRESS NOTE ADULT - PROBLEM SELECTOR PLAN 6
c/w senna, colace, miralax, lactulose  abd xray 8/16 showing ileus  lactulose daily  reglan added for promotility, f/u crc for enema through ostomy

## 2018-08-17 NOTE — PROGRESS NOTE ADULT - ASSESSMENT
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

## 2018-08-17 NOTE — PROGRESS NOTE ADULT - PROBLEM SELECTOR PLAN 5
s/p mitomycin/xeloda and RT 5/17 to 6/17. PET 1/18 showed hypermetabolism in anal area with rad onc concerned for local relapse/failure proved by biopsy 2/18  - underwent APR with Dr. Terrell Luong 7/24/18 with permanent end colostomy  -purulence around wound however it is draining what appears to been mucus  - CRC recs appreciated, PT wound care consult placed for replacement of wound vac  - miralax for constipation, senna, colace  - outpatient f/u with Dr. King

## 2018-08-17 NOTE — PROGRESS NOTE ADULT - ASSESSMENT
66 yo woman with PMH of COPD, ashtma on chronic steroids, tracheobronchomalacia s/p bronchial thermoplasty (10/16), Afib on Eliquis, HTN, adrenal insufficiency, T2DM, DVT, SCC anus (dx 2016, s/p chemo rad s/p APR 7/24/18) presenting from rehabilitation with fevers most likely 2/2  bacterial PNA

## 2018-08-17 NOTE — PROGRESS NOTE ADULT - SUBJECTIVE AND OBJECTIVE BOX
CHIEF COMPLAINT: f/up-asthma, allergy, chronic bronchitis, TBM--new PNA--    Interval Events: ID evaln    REVIEW OF SYSTEMS:  Constitutional: No fevers or chills. No weight loss. No fatigue or generalized malaise.  Eyes: No itching or discharge from the eyes  ENT: No ear pain. No ear discharge. No nasal congestion. No post nasal drip. No epistaxis. No throat pain. No sore throat. No difficulty swallowing.   CV: No chest pain. No palpitations. No lightheadedness or dizziness.   Resp: No dyspnea at rest. No dyspnea on exertion. No orthopnea. No wheezing. No cough. No stridor. No sputum production. No chest pain with respiration.  GI: No nausea. No vomiting. No diarrhea.  MSK: No joint pain or pain in any extremities  Integumentary: No skin lesions. No pedal edema.  Neurological: No gross motor weakness. No sensory changes.  [ ] All other systems negative  [ ] Unable to assess ROS because ________    OBJECTIVE:  ICU Vital Signs Last 24 Hrs  T(C): 37.2 (16 Aug 2018 21:09), Max: 37.6 (16 Aug 2018 05:19)  T(F): 99 (16 Aug 2018 21:09), Max: 99.6 (16 Aug 2018 05:19)  HR: 88 (16 Aug 2018 21:09) (77 - 88)  BP: 120/75 (16 Aug 2018 21:09) (120/75 - 154/70)  BP(mean): --  ABP: --  ABP(mean): --  RR: 19 (16 Aug 2018 21:09) (18 - 19)  SpO2: 97% (16 Aug 2018 21:09) (93% - 97%)        08-15 @ 07:01  -  08-16 @ 07:00  --------------------------------------------------------  IN: 860 mL / OUT: 800 mL / NET: 60 mL    08-16 @ 07:01  -  08-17 @ 05:17  --------------------------------------------------------  IN: 1420 mL / OUT: 2200 mL / NET: -780 mL      CAPILLARY BLOOD GLUCOSE      POCT Blood Glucose.: 152 mg/dL (16 Aug 2018 21:50)      PHYSICAL EXAM:  General: Awake, alert, oriented X 3.   HEENT: Atraumatic, normocephalic.                 Mallampatti Grade                 No nasal congestion.                No tonsillar or pharyngeal exudates.  Lymph Nodes: No palpable lymphadenopathy  Neck: No JVD. No carotid bruit.   Respiratory: Normal chest expansion                         Normal percussion                         Normal and equal air entry                         No wheeze, rhonchi or rales.  Cardiovascular: S1 S2 normal. No murmurs, rubs or gallops.   Abdomen: Soft, non-tender, non-distended. No organomegaly. NABS-ostomy in place  Extremities: Warm to touch. Peripheral pulse palpable. No pedal edema.   Skin: No rashes or skin lesions  Neurological: Motor and sensory examination equal and normal in all four extremities.  Psychiatry: Appropriate mood and affect.    HOSPITAL MEDICATIONS:  MEDICATIONS  (STANDING):  ALBUTerol/ipratropium for Nebulization 3 milliLiter(s) Nebulizer every 6 hours  apixaban 5 milliGRAM(s) Oral every 12 hours  buDESOnide 160 MICROgram(s)/formoterol 4.5 MICROgram(s) Inhaler 2 Puff(s) Inhalation two times a day  cefepime   IVPB 2000 milliGRAM(s) IV Intermittent every 8 hours  chlorhexidine 4% Liquid 1 Application(s) Topical <User Schedule>  dextrose 5%. 1000 milliLiter(s) (50 mL/Hr) IV Continuous <Continuous>  dextrose 50% Injectable 12.5 Gram(s) IV Push once  dextrose 50% Injectable 25 Gram(s) IV Push once  dextrose 50% Injectable 25 Gram(s) IV Push once  digoxin     Tablet 0.25 milliGRAM(s) Oral daily  docusate sodium 100 milliGRAM(s) Oral three times a day  fluconAZOLE   Tablet 100 milliGRAM(s) Oral daily  insulin glargine Injectable (LANTUS) 11 Unit(s) SubCutaneous at bedtime  insulin lispro (HumaLOG) corrective regimen sliding scale   SubCutaneous three times a day before meals  insulin lispro (HumaLOG) corrective regimen sliding scale   SubCutaneous at bedtime  insulin lispro Injectable (HumaLOG) 3 Unit(s) SubCutaneous three times a day before meals  methylPREDNISolone 4 milliGRAM(s) Oral daily  metoclopramide Injectable 10 milliGRAM(s) IV Push once  montelukast 10 milliGRAM(s) Oral daily  pantoprazole    Tablet 40 milliGRAM(s) Oral before breakfast  polyethylene glycol 3350 17 Gram(s) Oral every 12 hours  senna 2 Tablet(s) Oral at bedtime  vancomycin  IVPB 1000 milliGRAM(s) IV Intermittent every 12 hours  vancomycin  IVPB        MEDICATIONS  (PRN):  acetaminophen   Tablet 650 milliGRAM(s) Oral every 6 hours PRN Moderate Pain (4 - 6)  dextrose 40% Gel 15 Gram(s) Oral once PRN Blood Glucose LESS THAN 70 milliGRAM(s)/deciliter  glucagon  Injectable 1 milliGRAM(s) IntraMuscular once PRN Glucose LESS THAN 70 milligrams/deciliter  metoclopramide Injectable 10 milliGRAM(s) IV Push every 6 hours PRN nausea  simethicone 80 milliGRAM(s) Chew four times a day PRN Gas      LABS:                        9.5    10.51 )-----------( 422      ( 16 Aug 2018 07:38 )             30.9     08-16    136  |  98  |  7   ----------------------------<  170<H>  3.6   |  28  |  0.62    Ca    8.6      16 Aug 2018 07:15  Mg     1.6     08-16    TPro  6.5  /  Alb  3.0<L>  /  TBili  0.2  /  DBili  x   /  AST  13  /  ALT  10  /  AlkPhos  87  08-15    PT/INR - ( 15 Aug 2018 07:29 )   PT: 16.7 sec;   INR: 1.47 ratio                   MICROBIOLOGY:     RADIOLOGY:  [ ] Reviewed and interpreted by me    Point of Care Ultrasound Findings:    PFT:    EKG: CHIEF COMPLAINT: f/up-asthma, allergy, chronic bronchitis, TBM--new PNA--better today--sputum sent, gassy, burn on urination    Interval Events: ID evaln- OOB    REVIEW OF SYSTEMS:  Constitutional: No fevers or chills. No weight loss. + fatigue or generalized malaise.  Eyes: No itching or discharge from the eyes  ENT: No ear pain. No ear discharge. No nasal congestion. No post nasal drip. No epistaxis. No throat pain. No sore throat. No difficulty swallowing.   CV: No chest pain. No palpitations. No lightheadedness or dizziness.   Resp: No dyspnea at rest. + dyspnea on exertion. No orthopnea. No wheezing. + cough. No stridor. + sputum production. No chest pain with respiration.  GI: No nausea. No vomiting. No diarrhea.  MSK: No joint pain or pain in any extremities  Integumentary: No skin lesions. No pedal edema.  Neurological: No gross motor weakness. No sensory changes.  [+ ] All other systems negative  [ ] Unable to assess ROS because ________    OBJECTIVE:  ICU Vital Signs Last 24 Hrs  T(C): 37.2 (16 Aug 2018 21:09), Max: 37.6 (16 Aug 2018 05:19)  T(F): 99 (16 Aug 2018 21:09), Max: 99.6 (16 Aug 2018 05:19)  HR: 88 (16 Aug 2018 21:09) (77 - 88)  BP: 120/75 (16 Aug 2018 21:09) (120/75 - 154/70)  BP(mean): --  ABP: --  ABP(mean): --  RR: 19 (16 Aug 2018 21:09) (18 - 19)  SpO2: 97% (16 Aug 2018 21:09) (93% - 97%)        08-15 @ 07:01  -  08-16 @ 07:00  --------------------------------------------------------  IN: 860 mL / OUT: 800 mL / NET: 60 mL    08-16 @ 07:01  -  08-17 @ 05:17  --------------------------------------------------------  IN: 1420 mL / OUT: 2200 mL / NET: -780 mL      CAPILLARY BLOOD GLUCOSE      POCT Blood Glucose.: 152 mg/dL (16 Aug 2018 21:50)      PHYSICAL EXAM: NAD in bed on RA  General: Awake, alert, oriented X 3.   HEENT: Atraumatic, normocephalic.                 Mallampatti Grade 2                No nasal congestion.                No tonsillar or pharyngeal exudates.  Lymph Nodes: No palpable lymphadenopathy  Neck: No JVD. No carotid bruit.   Respiratory: Normal chest expansion                         Normal percussion                         Normal and equal air entry                         No wheeze, rhonchi but very mild RLL rales.  Cardiovascular: S1 S2 normal. No murmurs, rubs or gallops.   Abdomen: Soft, non-tender, non-distended. No organomegaly. NABS-ostomy in place  Extremities: Warm to touch. Peripheral pulse palpable. No pedal edema.   Skin: No rashes or skin lesions  Neurological: Motor and sensory examination equal and normal in all four extremities.  Psychiatry: Appropriate mood and affect.    HOSPITAL MEDICATIONS:  MEDICATIONS  (STANDING):  ALBUTerol/ipratropium for Nebulization 3 milliLiter(s) Nebulizer every 6 hours  apixaban 5 milliGRAM(s) Oral every 12 hours  buDESOnide 160 MICROgram(s)/formoterol 4.5 MICROgram(s) Inhaler 2 Puff(s) Inhalation two times a day  cefepime   IVPB 2000 milliGRAM(s) IV Intermittent every 8 hours  chlorhexidine 4% Liquid 1 Application(s) Topical <User Schedule>  dextrose 5%. 1000 milliLiter(s) (50 mL/Hr) IV Continuous <Continuous>  dextrose 50% Injectable 12.5 Gram(s) IV Push once  dextrose 50% Injectable 25 Gram(s) IV Push once  dextrose 50% Injectable 25 Gram(s) IV Push once  digoxin     Tablet 0.25 milliGRAM(s) Oral daily  docusate sodium 100 milliGRAM(s) Oral three times a day  fluconAZOLE   Tablet 100 milliGRAM(s) Oral daily  insulin glargine Injectable (LANTUS) 11 Unit(s) SubCutaneous at bedtime  insulin lispro (HumaLOG) corrective regimen sliding scale   SubCutaneous three times a day before meals  insulin lispro (HumaLOG) corrective regimen sliding scale   SubCutaneous at bedtime  insulin lispro Injectable (HumaLOG) 3 Unit(s) SubCutaneous three times a day before meals  methylPREDNISolone 4 milliGRAM(s) Oral daily  metoclopramide Injectable 10 milliGRAM(s) IV Push once  montelukast 10 milliGRAM(s) Oral daily  pantoprazole    Tablet 40 milliGRAM(s) Oral before breakfast  polyethylene glycol 3350 17 Gram(s) Oral every 12 hours  senna 2 Tablet(s) Oral at bedtime  vancomycin  IVPB 1000 milliGRAM(s) IV Intermittent every 12 hours  vancomycin  IVPB        MEDICATIONS  (PRN):  acetaminophen   Tablet 650 milliGRAM(s) Oral every 6 hours PRN Moderate Pain (4 - 6)  dextrose 40% Gel 15 Gram(s) Oral once PRN Blood Glucose LESS THAN 70 milliGRAM(s)/deciliter  glucagon  Injectable 1 milliGRAM(s) IntraMuscular once PRN Glucose LESS THAN 70 milligrams/deciliter  metoclopramide Injectable 10 milliGRAM(s) IV Push every 6 hours PRN nausea  simethicone 80 milliGRAM(s) Chew four times a day PRN Gas      LABS:                        9.5    10.51 )-----------( 422      ( 16 Aug 2018 07:38 )             30.9     08-16    136  |  98  |  7   ----------------------------<  170<H>  3.6   |  28  |  0.62    Ca    8.6      16 Aug 2018 07:15  Mg     1.6     08-16    TPro  6.5  /  Alb  3.0<L>  /  TBili  0.2  /  DBili  x   /  AST  13  /  ALT  10  /  AlkPhos  87  08-15    PT/INR - ( 15 Aug 2018 07:29 )   PT: 16.7 sec;   INR: 1.47 ratio                   MICROBIOLOGY:     RADIOLOGY:  [ ] Reviewed and interpreted by me    Point of Care Ultrasound Findings:    PFT:    EKG:

## 2018-08-17 NOTE — PROGRESS NOTE ADULT - SUBJECTIVE AND OBJECTIVE BOX
Infectious Diseases progress note:    Subjective:  Abd xray shows progression of stool.  Improved outpt today in ostomy.   c/o burning on urination.  s/p wound vac placement.   No sob, occasional cough.      ROS:  CONSTITUTIONAL:  No fever, chills, rigors  CARDIOVASCULAR:  No chest pain or palpitations  RESPIRATORY:   No SOB, cough, dyspnea on exertion.  No wheezing  GASTROINTESTINAL:  No abd pain, N/V, diarrhea/constipation  EXTREMITIES:  No swelling or joint pain  GENITOURINARY:  No burning on urination, increased frequency or urgency.  No flank pain  NEUROLOGIC:  No HA, visual disturbances  SKIN: No rashes    Allergies    ampicillin (Short breath)  aspirin (Short breath)  Avelox (Short breath; Pruritus)  codeine (Short breath)  Dilaudid (Short breath)  iodine (Short breath; Swelling)  penicillin (Short breath)  shellfish (Anaphylaxis)  tetanus toxoid (Short breath)  Valium (Short breath)    Intolerances        ANTIBIOTICS/RELEVANT:  antimicrobials  cefepime   IVPB 2000 milliGRAM(s) IV Intermittent every 8 hours  fluconAZOLE   Tablet 100 milliGRAM(s) Oral daily  vancomycin  IVPB 1000 milliGRAM(s) IV Intermittent every 12 hours  vancomycin  IVPB        immunologic:    OTHER:  acetaminophen   Tablet 650 milliGRAM(s) Oral every 6 hours PRN  ALBUTerol/ipratropium for Nebulization 3 milliLiter(s) Nebulizer every 6 hours  apixaban 5 milliGRAM(s) Oral every 12 hours  buDESOnide 160 MICROgram(s)/formoterol 4.5 MICROgram(s) Inhaler 2 Puff(s) Inhalation two times a day  chlorhexidine 4% Liquid 1 Application(s) Topical <User Schedule>  dextrose 40% Gel 15 Gram(s) Oral once PRN  dextrose 5%. 1000 milliLiter(s) IV Continuous <Continuous>  dextrose 50% Injectable 12.5 Gram(s) IV Push once  dextrose 50% Injectable 25 Gram(s) IV Push once  dextrose 50% Injectable 25 Gram(s) IV Push once  digoxin     Tablet 0.25 milliGRAM(s) Oral daily  docusate sodium 100 milliGRAM(s) Oral three times a day  glucagon  Injectable 1 milliGRAM(s) IntraMuscular once PRN  insulin glargine Injectable (LANTUS) 12 Unit(s) SubCutaneous at bedtime  insulin lispro (HumaLOG) corrective regimen sliding scale   SubCutaneous three times a day before meals  insulin lispro (HumaLOG) corrective regimen sliding scale   SubCutaneous at bedtime  insulin lispro Injectable (HumaLOG) 4 Unit(s) SubCutaneous three times a day before meals  methylPREDNISolone 4 milliGRAM(s) Oral daily  metoclopramide Injectable 10 milliGRAM(s) IV Push every 6 hours PRN  montelukast 10 milliGRAM(s) Oral daily  pantoprazole    Tablet 40 milliGRAM(s) Oral before breakfast  phenazopyridine 100 milliGRAM(s) Oral every 8 hours  polyethylene glycol 3350 17 Gram(s) Oral every 12 hours  senna 2 Tablet(s) Oral at bedtime  simethicone 160 milliGRAM(s) Chew four times a day PRN  traMADol 25 milliGRAM(s) Oral every 8 hours PRN      Objective:  Vital Signs Last 24 Hrs  T(C): 36.7 (17 Aug 2018 13:39), Max: 37.2 (16 Aug 2018 21:09)  T(F): 98 (17 Aug 2018 13:39), Max: 99 (16 Aug 2018 21:09)  HR: 97 (17 Aug 2018 13:39) (78 - 97)  BP: 133/75 (17 Aug 2018 13:39) (120/70 - 133/75)  BP(mean): --  RR: 18 (17 Aug 2018 13:39) (18 - 19)  SpO2: 99% (17 Aug 2018 13:39) (92% - 99%)    PHYSICAL EXAM:  Constitutional:NAD  Eyes:EBER, EOMI  Ear/Nose/Throat: no thrush, mucositis.  Moist mucous membranes	  Neck:no JVD, no lymphadenopathy, supple  Respiratory: CTA barry  Cardiovascular: S1S2 RRR, no murmurs  Gastrointestinal:soft, nontender,  nondistended (+) BS, ostomy with small amount soft brown stool  Extremities:no e/e/c  Skin:  wound vac over perineal wound        LABS:                        9.9    11.34 )-----------( 459      ( 17 Aug 2018 13:55 )             33.1     08-17    136  |  95<L>  |  8   ----------------------------<  191<H>  3.7   |  27  |  0.55    Ca    9.0      17 Aug 2018 08:30  Mg     1.6     08-17                      Rapid RVP Result: [facility]          MICROBIOLOGY:    Culture - Acid Fast - Sputum w/Smear . (08.17.18 @ 00:38)    Specimen Source: .Sputum Sputum    Acid Fast Bacilli Smear:   No acid fast bacilli seen by fluorochrome stain    Culture - Urine (08.16.18 @ 08:50)    Specimen Source: .Urine Clean Catch (Midstream)    Culture Results:   10,000 - 49,000 CFU/mL Enterococcus faecalis  <10,000 CFU/ml Normal Urogenital val present    Culture - Blood (08.14.18 @ 17:19)    Specimen Source: .Blood Blood-Venous    Culture Results:   No growth to date.    Culture - Blood (08.14.18 @ 17:19)    Specimen Source: .Blood Blood-Peripheral    Culture Results:   No growth to date.          RADIOLOGY & ADDITIONAL STUDIES:    < from: Xray Abdomen 1 View PORTABLE -Urgent (08.17.18 @ 15:30) >  IMPRESSION:   Progression of stool within the colon. Interval decrease in intraluminal   air in the small bowel compared to prior image.    < end of copied text >

## 2018-08-17 NOTE — PROGRESS NOTE ADULT - PROBLEM SELECTOR PLAN 1
ct scan showing worsening opacity in RLL, ct chest showing possibility of DIEUDONNE infection, tree in bud opacities  c/w cefepime 2gm IV q8hr (8/14- and vancomycin 1gm q12hr ( 8/15-,   f/u vanc trough tonight  urine legionella neg can d/c azithromycin   c/w chest pt , acapella, duonebs, aspiration precautions  appreciate pulm recs  afb x 3 ( instructed nursed to retrieve in am) 8/16 pending  repeat sputum bcx 8/16 pending  bcx ngtd  appreciate pulm, id recs

## 2018-08-17 NOTE — PROGRESS NOTE ADULT - PROBLEM SELECTOR PLAN 1
?PNA (may represent DIEUDONNE) vs. UTI--check all cx  D2 cefepime/vanco (p/t) ?PNA (may represent DIEUDONNE) vs. UTI--check all cx  D3 cefepime/vanco (p/t)  Diflucan for yeast urine

## 2018-08-17 NOTE — PROGRESS NOTE ADULT - ATTENDING COMMENTS
as above--  asthma, TBM, chronic bronchitis---?new PNA vs UTI (? DIEUDONNE)  F/up all cx--re send sputum for C and S and AFB x3 days; continue cefepiime/vanco (covers pseudomonas)--ID luis (Afshan Stout et al)  Zskit-Jkdyes-LU-on AC  PT needed    Eulalio Allison MD-Pulmonary   [phone] as above--  asthma, TBM, chronic bronchitis---CT c/w new PNA vs UTI (? DIEUDONNE)  F/up all cx--re send sputum for C and S and AFB x3 days; continue cefepiime/vanco (covers pseudomonas)--ID luis (Afshan Stout et al)-diflucan for yeast in urine  Uutth-Eoelce-MF-on AC  PT needed    Eulalio Allison MD-Pulmonary   [phone]

## 2018-08-17 NOTE — PROGRESS NOTE ADULT - SUBJECTIVE AND OBJECTIVE BOX
Patient is a 67y old  Female who presents with a chief complaint of UTI (14 Aug 2018 21:17)      SUBJECTIVE / OVERNIGHT EVENTS:    patient seen and examined.  still no BM. passing a lot of gas.  wheezing improved.  sputum production has improved, yellow scant.    ROS:  14 point ROS negative in detail except stated as above    MEDICATIONS  (STANDING):  ALBUTerol/ipratropium for Nebulization 3 milliLiter(s) Nebulizer every 6 hours  apixaban 5 milliGRAM(s) Oral every 12 hours  buDESOnide 160 MICROgram(s)/formoterol 4.5 MICROgram(s) Inhaler 2 Puff(s) Inhalation two times a day  cefepime   IVPB 2000 milliGRAM(s) IV Intermittent every 8 hours  chlorhexidine 4% Liquid 1 Application(s) Topical <User Schedule>  dextrose 5%. 1000 milliLiter(s) (50 mL/Hr) IV Continuous <Continuous>  dextrose 50% Injectable 12.5 Gram(s) IV Push once  dextrose 50% Injectable 25 Gram(s) IV Push once  dextrose 50% Injectable 25 Gram(s) IV Push once  digoxin     Tablet 0.25 milliGRAM(s) Oral daily  docusate sodium 100 milliGRAM(s) Oral three times a day  fluconAZOLE   Tablet 100 milliGRAM(s) Oral daily  insulin glargine Injectable (LANTUS) 11 Unit(s) SubCutaneous at bedtime  insulin lispro (HumaLOG) corrective regimen sliding scale   SubCutaneous three times a day before meals  insulin lispro (HumaLOG) corrective regimen sliding scale   SubCutaneous at bedtime  insulin lispro Injectable (HumaLOG) 3 Unit(s) SubCutaneous three times a day before meals  methylPREDNISolone 4 milliGRAM(s) Oral daily  montelukast 10 milliGRAM(s) Oral daily  pantoprazole    Tablet 40 milliGRAM(s) Oral before breakfast  phenazopyridine 100 milliGRAM(s) Oral every 8 hours  polyethylene glycol 3350 17 Gram(s) Oral every 12 hours  senna 2 Tablet(s) Oral at bedtime  vancomycin  IVPB 1000 milliGRAM(s) IV Intermittent every 12 hours  vancomycin  IVPB        MEDICATIONS  (PRN):  acetaminophen   Tablet 650 milliGRAM(s) Oral every 6 hours PRN Moderate Pain (4 - 6)  dextrose 40% Gel 15 Gram(s) Oral once PRN Blood Glucose LESS THAN 70 milliGRAM(s)/deciliter  glucagon  Injectable 1 milliGRAM(s) IntraMuscular once PRN Glucose LESS THAN 70 milligrams/deciliter  metoclopramide Injectable 10 milliGRAM(s) IV Push every 6 hours PRN nausea  simethicone 160 milliGRAM(s) Chew four times a day PRN Gas      CAPILLARY BLOOD GLUCOSE      POCT Blood Glucose.: 189 mg/dL (17 Aug 2018 07:47)  POCT Blood Glucose.: 152 mg/dL (16 Aug 2018 21:50)  POCT Blood Glucose.: 198 mg/dL (16 Aug 2018 16:46)  POCT Blood Glucose.: 287 mg/dL (16 Aug 2018 12:03)    I&O's Summary    16 Aug 2018 07:01  -  17 Aug 2018 07:00  --------------------------------------------------------  IN: 1420 mL / OUT: 2500 mL / NET: -1080 mL        PHYSICAL EXAM:  Vital Signs Last 24 Hrs  T(C): 36.9 (17 Aug 2018 06:07), Max: 37.2 (16 Aug 2018 12:46)  T(F): 98.4 (17 Aug 2018 06:07), Max: 99 (16 Aug 2018 21:09)  HR: 78 (17 Aug 2018 06:07) (78 - 88)  BP: 120/70 (17 Aug 2018 06:07) (120/70 - 138/72)  BP(mean): --  RR: 18 (17 Aug 2018 06:07) (18 - 19)  SpO2: 92% (17 Aug 2018 06:07) (92% - 97%)    General: NAD  Eyes: PERRL/ EOMI  ENT/Neck: Neck supple, trachea midline, No JVD  Respiratory: b/l rhonchi  CV: S1S2 RRR no murmurs, rubs or gallops  Abdominal: Soft, NT, ND + normoactive BS, colostomy bag in place c/d/i.  Extremities: No edema, + 2 peripheral pulses b/l   Skin: No Rashes, Hematoma, Ecchymosis      LABS:                        9.5    10.51 )-----------( 422      ( 16 Aug 2018 07:38 )             30.9     08-17    136  |  95<L>  |  8   ----------------------------<  191<H>  3.7   |  27  |  0.55    Ca    9.0      17 Aug 2018 08:30  Mg     1.6     08-17                    Consultant(s) Notes Reviewed:    gi, crc, id, pulm  Care Discussed with Consultants/Other Providers:  ALLEY voss

## 2018-08-17 NOTE — PROGRESS NOTE ADULT - PROBLEM SELECTOR PLAN 8
- aiss   - A1c ordered for am  - lantus 11qhs increased from 8u last night, monitor fs at goal  - humalog 3u tid ac

## 2018-08-17 NOTE — PROGRESS NOTE ADULT - ASSESSMENT
The patient is a 68 yo woman with PMH of COPD, ashtma on chronic steroids, tracheobronchomalacia s/p bronchial thermoplasty (10/16), Afib on Eliquis, HTN, adrenal insufficiency, T2DM, DVT, SCC anus (dx 2016, s/p chemo rad s/p APR 7/24/18) presenting from rehabiliation with fevers. The patient stated she felt subjective fevers yesterday to T max 100.2 this morning, she was informed given she is on steroids T > 99 would be fever, prompting her to come to the ED. She is denying any chills URI symptoms of rhinorrhea, sore throat, dysphagia. She has cough that is sometimes productive with greenish phlegm however she has had it for several years. She feels increased SOB non-exertional from decreased inspiratory effort. During patient's previous admission end of July 2018 she developed fevers with positive urine culture. She was treated with 7 day course of ceftriaxone. She is reporting continued dysuria, increased frequency, incontinence. The patient ostomy has not emptied since she was in rehabilitation, she is citing functioing during previous admission prior to rehabilitation. She is denying any sick contacts.    Pt states she has an open draining wound in the perineum, for which she had a wound vac that is now off.  Drainage is malodorous.     Problem/Plan - 1:    ·	HCAP/aspiration pna    - CT chest with multifocal R sided pna.   Agree with broad spectrum abx coverage with vanco/cefepime given recent hospitalization.  Recent sputum cx grew pseudomonas    - Check repeat sputum cx.  Legionella was negative    - f/u blood cultures    - r/o DIEUDONNE.  f/u sputum AFB x 3.      Problem/Plan - 2:    ·	UTI    - (+) UA with urinary symptoms.  Budding yeast seen on UA.  Urine cultures with E.faecalis.  f/u sensis    - Cont abx.  d/c diflucan    Problem/Plan - 3:    ·	Open perineal wound    - Pt with recent anal SCC and resection.  s/p wound care evaluation for wound vac/topical care recommendations    - Colorectal surgery f/u    Will follow    Joselyn Esteban  [phone]

## 2018-08-17 NOTE — PROGRESS NOTE ADULT - PROBLEM SELECTOR PLAN 2
yeast in u/a  urine cx ordered, not showing candida  f/u id for further recs  c/w diflucan 100mg daily (8/15-

## 2018-08-18 LAB
-  AMPICILLIN: SIGNIFICANT CHANGE UP
-  CIPROFLOXACIN: SIGNIFICANT CHANGE UP
-  LEVOFLOXACIN: SIGNIFICANT CHANGE UP
-  NITROFURANTOIN: SIGNIFICANT CHANGE UP
-  TETRACYCLINE: SIGNIFICANT CHANGE UP
-  VANCOMYCIN: SIGNIFICANT CHANGE UP
ANION GAP SERPL CALC-SCNC: 12 MMOL/L — SIGNIFICANT CHANGE UP (ref 5–17)
BUN SERPL-MCNC: 6 MG/DL — LOW (ref 7–23)
CALCIUM SERPL-MCNC: 9 MG/DL — SIGNIFICANT CHANGE UP (ref 8.4–10.5)
CHLORIDE SERPL-SCNC: 98 MMOL/L — SIGNIFICANT CHANGE UP (ref 96–108)
CO2 SERPL-SCNC: 30 MMOL/L — SIGNIFICANT CHANGE UP (ref 22–31)
CREAT SERPL-MCNC: 0.54 MG/DL — SIGNIFICANT CHANGE UP (ref 0.5–1.3)
CULTURE RESULTS: SIGNIFICANT CHANGE UP
GLUCOSE BLDC GLUCOMTR-MCNC: 103 MG/DL — HIGH (ref 70–99)
GLUCOSE BLDC GLUCOMTR-MCNC: 120 MG/DL — HIGH (ref 70–99)
GLUCOSE BLDC GLUCOMTR-MCNC: 161 MG/DL — HIGH (ref 70–99)
GLUCOSE BLDC GLUCOMTR-MCNC: 229 MG/DL — HIGH (ref 70–99)
GLUCOSE SERPL-MCNC: 100 MG/DL — HIGH (ref 70–99)
HBA1C BLD-MCNC: 7.6 % — HIGH (ref 4–5.6)
HCT VFR BLD CALC: 31.3 % — LOW (ref 34.5–45)
HGB BLD-MCNC: 9.5 G/DL — LOW (ref 11.5–15.5)
MAGNESIUM SERPL-MCNC: 2 MG/DL — SIGNIFICANT CHANGE UP (ref 1.6–2.6)
MCHC RBC-ENTMCNC: 22.2 PG — LOW (ref 27–34)
MCHC RBC-ENTMCNC: 30.4 GM/DL — LOW (ref 32–36)
MCV RBC AUTO: 73.1 FL — LOW (ref 80–100)
METHOD TYPE: SIGNIFICANT CHANGE UP
NIGHT BLUE STAIN TISS: SIGNIFICANT CHANGE UP
ORGANISM # SPEC MICROSCOPIC CNT: SIGNIFICANT CHANGE UP
ORGANISM # SPEC MICROSCOPIC CNT: SIGNIFICANT CHANGE UP
PLATELET # BLD AUTO: 432 K/UL — HIGH (ref 150–400)
POTASSIUM SERPL-MCNC: 3.6 MMOL/L — SIGNIFICANT CHANGE UP (ref 3.5–5.3)
POTASSIUM SERPL-SCNC: 3.6 MMOL/L — SIGNIFICANT CHANGE UP (ref 3.5–5.3)
RBC # BLD: 4.28 M/UL — SIGNIFICANT CHANGE UP (ref 3.8–5.2)
RBC # FLD: 16 % — HIGH (ref 10.3–14.5)
SODIUM SERPL-SCNC: 140 MMOL/L — SIGNIFICANT CHANGE UP (ref 135–145)
SPECIMEN SOURCE: SIGNIFICANT CHANGE UP
SPECIMEN SOURCE: SIGNIFICANT CHANGE UP
VANCOMYCIN TROUGH SERPL-MCNC: 12.1 UG/ML — SIGNIFICANT CHANGE UP (ref 10–20)
WBC # BLD: 8.25 K/UL — SIGNIFICANT CHANGE UP (ref 3.8–10.5)
WBC # FLD AUTO: 8.25 K/UL — SIGNIFICANT CHANGE UP (ref 3.8–10.5)

## 2018-08-18 PROCEDURE — 99233 SBSQ HOSP IP/OBS HIGH 50: CPT | Mod: GC

## 2018-08-18 PROCEDURE — 99232 SBSQ HOSP IP/OBS MODERATE 35: CPT

## 2018-08-18 RX ORDER — ONDANSETRON 8 MG/1
4 TABLET, FILM COATED ORAL ONCE
Qty: 0 | Refills: 0 | Status: COMPLETED | OUTPATIENT
Start: 2018-08-18 | End: 2018-08-18

## 2018-08-18 RX ORDER — ACETAMINOPHEN 500 MG
1000 TABLET ORAL EVERY 8 HOURS
Qty: 0 | Refills: 0 | Status: DISCONTINUED | OUTPATIENT
Start: 2018-08-18 | End: 2018-08-23

## 2018-08-18 RX ORDER — VANCOMYCIN HCL 1 G
1250 VIAL (EA) INTRAVENOUS EVERY 12 HOURS
Qty: 0 | Refills: 0 | Status: DISCONTINUED | OUTPATIENT
Start: 2018-08-18 | End: 2018-08-18

## 2018-08-18 RX ORDER — ONDANSETRON 8 MG/1
4 TABLET, FILM COATED ORAL EVERY 6 HOURS
Qty: 0 | Refills: 0 | Status: DISCONTINUED | OUTPATIENT
Start: 2018-08-18 | End: 2018-08-23

## 2018-08-18 RX ORDER — LANOLIN ALCOHOL/MO/W.PET/CERES
3 CREAM (GRAM) TOPICAL ONCE
Qty: 0 | Refills: 0 | Status: COMPLETED | OUTPATIENT
Start: 2018-08-18 | End: 2018-08-18

## 2018-08-18 RX ORDER — MORPHINE SULFATE 50 MG/1
1 CAPSULE, EXTENDED RELEASE ORAL ONCE
Qty: 0 | Refills: 0 | Status: DISCONTINUED | OUTPATIENT
Start: 2018-08-18 | End: 2018-08-18

## 2018-08-18 RX ORDER — TRAMADOL HYDROCHLORIDE 50 MG/1
25 TABLET ORAL EVERY 4 HOURS
Qty: 0 | Refills: 0 | Status: DISCONTINUED | OUTPATIENT
Start: 2018-08-18 | End: 2018-08-23

## 2018-08-18 RX ORDER — VANCOMYCIN HCL 1 G
1500 VIAL (EA) INTRAVENOUS EVERY 12 HOURS
Qty: 0 | Refills: 0 | Status: DISCONTINUED | OUTPATIENT
Start: 2018-08-18 | End: 2018-08-19

## 2018-08-18 RX ORDER — MORPHINE SULFATE 50 MG/1
1 CAPSULE, EXTENDED RELEASE ORAL DAILY
Qty: 0 | Refills: 0 | Status: DISCONTINUED | OUTPATIENT
Start: 2018-08-18 | End: 2018-08-23

## 2018-08-18 RX ORDER — MORPHINE SULFATE 50 MG/1
2 CAPSULE, EXTENDED RELEASE ORAL ONCE
Qty: 0 | Refills: 0 | Status: DISCONTINUED | OUTPATIENT
Start: 2018-08-18 | End: 2018-08-18

## 2018-08-18 RX ADMIN — BUDESONIDE AND FORMOTEROL FUMARATE DIHYDRATE 2 PUFF(S): 160; 4.5 AEROSOL RESPIRATORY (INHALATION) at 05:58

## 2018-08-18 RX ADMIN — POLYETHYLENE GLYCOL 3350 17 GRAM(S): 17 POWDER, FOR SOLUTION ORAL at 17:25

## 2018-08-18 RX ADMIN — Medication 4 MILLIGRAM(S): at 05:58

## 2018-08-18 RX ADMIN — ONDANSETRON 4 MILLIGRAM(S): 8 TABLET, FILM COATED ORAL at 08:47

## 2018-08-18 RX ADMIN — SIMETHICONE 160 MILLIGRAM(S): 80 TABLET, CHEWABLE ORAL at 05:58

## 2018-08-18 RX ADMIN — SIMETHICONE 160 MILLIGRAM(S): 80 TABLET, CHEWABLE ORAL at 20:04

## 2018-08-18 RX ADMIN — SENNA PLUS 2 TABLET(S): 8.6 TABLET ORAL at 21:29

## 2018-08-18 RX ADMIN — CEFEPIME 100 MILLIGRAM(S): 1 INJECTION, POWDER, FOR SOLUTION INTRAMUSCULAR; INTRAVENOUS at 23:06

## 2018-08-18 RX ADMIN — INSULIN GLARGINE 12 UNIT(S): 100 INJECTION, SOLUTION SUBCUTANEOUS at 21:48

## 2018-08-18 RX ADMIN — Medication 3 MILLILITER(S): at 23:06

## 2018-08-18 RX ADMIN — Medication 100 MILLIGRAM(S): at 21:29

## 2018-08-18 RX ADMIN — Medication 2: at 12:37

## 2018-08-18 RX ADMIN — TRAMADOL HYDROCHLORIDE 25 MILLIGRAM(S): 50 TABLET ORAL at 22:12

## 2018-08-18 RX ADMIN — POLYETHYLENE GLYCOL 3350 17 GRAM(S): 17 POWDER, FOR SOLUTION ORAL at 21:29

## 2018-08-18 RX ADMIN — Medication 3 MILLILITER(S): at 12:37

## 2018-08-18 RX ADMIN — Medication 4 UNIT(S): at 12:37

## 2018-08-18 RX ADMIN — MONTELUKAST 10 MILLIGRAM(S): 4 TABLET, CHEWABLE ORAL at 12:39

## 2018-08-18 RX ADMIN — Medication 3 MILLILITER(S): at 05:58

## 2018-08-18 RX ADMIN — Medication 1: at 08:14

## 2018-08-18 RX ADMIN — Medication 100 MILLIGRAM(S): at 05:58

## 2018-08-18 RX ADMIN — CEFEPIME 100 MILLIGRAM(S): 1 INJECTION, POWDER, FOR SOLUTION INTRAMUSCULAR; INTRAVENOUS at 14:09

## 2018-08-18 RX ADMIN — PANTOPRAZOLE SODIUM 40 MILLIGRAM(S): 20 TABLET, DELAYED RELEASE ORAL at 06:00

## 2018-08-18 RX ADMIN — TRAMADOL HYDROCHLORIDE 25 MILLIGRAM(S): 50 TABLET ORAL at 21:24

## 2018-08-18 RX ADMIN — Medication 100 MILLIGRAM(S): at 14:10

## 2018-08-18 RX ADMIN — MORPHINE SULFATE 1 MILLIGRAM(S): 50 CAPSULE, EXTENDED RELEASE ORAL at 10:30

## 2018-08-18 RX ADMIN — Medication 250 MILLIGRAM(S): at 08:17

## 2018-08-18 RX ADMIN — CEFEPIME 100 MILLIGRAM(S): 1 INJECTION, POWDER, FOR SOLUTION INTRAMUSCULAR; INTRAVENOUS at 05:58

## 2018-08-18 RX ADMIN — SIMETHICONE 160 MILLIGRAM(S): 80 TABLET, CHEWABLE ORAL at 14:10

## 2018-08-18 RX ADMIN — TRAMADOL HYDROCHLORIDE 25 MILLIGRAM(S): 50 TABLET ORAL at 02:55

## 2018-08-18 RX ADMIN — APIXABAN 5 MILLIGRAM(S): 2.5 TABLET, FILM COATED ORAL at 21:29

## 2018-08-18 RX ADMIN — Medication 0.25 MILLIGRAM(S): at 05:58

## 2018-08-18 RX ADMIN — BUDESONIDE AND FORMOTEROL FUMARATE DIHYDRATE 2 PUFF(S): 160; 4.5 AEROSOL RESPIRATORY (INHALATION) at 17:21

## 2018-08-18 RX ADMIN — Medication 3 MILLILITER(S): at 17:21

## 2018-08-18 RX ADMIN — Medication 3 MILLIGRAM(S): at 23:06

## 2018-08-18 RX ADMIN — Medication 166.67 MILLIGRAM(S): at 21:33

## 2018-08-18 RX ADMIN — Medication 10 MILLIGRAM(S): at 06:45

## 2018-08-18 RX ADMIN — MORPHINE SULFATE 1 MILLIGRAM(S): 50 CAPSULE, EXTENDED RELEASE ORAL at 10:58

## 2018-08-18 RX ADMIN — APIXABAN 5 MILLIGRAM(S): 2.5 TABLET, FILM COATED ORAL at 10:13

## 2018-08-18 RX ADMIN — SIMETHICONE 160 MILLIGRAM(S): 80 TABLET, CHEWABLE ORAL at 08:28

## 2018-08-18 RX ADMIN — TRAMADOL HYDROCHLORIDE 25 MILLIGRAM(S): 50 TABLET ORAL at 03:30

## 2018-08-18 RX ADMIN — Medication 1000 MILLIGRAM(S): at 17:25

## 2018-08-18 RX ADMIN — Medication 4 UNIT(S): at 17:21

## 2018-08-18 NOTE — PROGRESS NOTE ADULT - PROBLEM SELECTOR PLAN 6
c/w senna, colace, miralax, lactulose  abd xray 8/16 showing ileus  lactulose daily  reglan added for promotility, Enema through ostomy done by CRC today  given nausea will add zofran as well to regimen, will change to a clear liquid diet and advance as tolerated.

## 2018-08-18 NOTE — PROGRESS NOTE ADULT - SUBJECTIVE AND OBJECTIVE BOX
Infectious Diseases progress note:    Subjective: s/p enema through ostomy, states improved output.  Cough is better.  Dysuria resolving.      ROS:  CONSTITUTIONAL:  No fever, chills, rigors  CARDIOVASCULAR:  No chest pain or palpitations  RESPIRATORY:   No SOB, cough, dyspnea on exertion.  No wheezing  GASTROINTESTINAL:  No abd pain, N/V, diarrhea/constipation  EXTREMITIES:  No swelling or joint pain  GENITOURINARY:  No burning on urination, increased frequency or urgency.  No flank pain  NEUROLOGIC:  No HA, visual disturbances  SKIN: No rashes    Allergies    ampicillin (Short breath)  aspirin (Short breath)  Avelox (Short breath; Pruritus)  codeine (Short breath)  Dilaudid (Short breath)  iodine (Short breath; Swelling)  penicillin (Short breath)  shellfish (Anaphylaxis)  tetanus toxoid (Short breath)  Valium (Short breath)    Intolerances        ANTIBIOTICS/RELEVANT:  antimicrobials  cefepime   IVPB 2000 milliGRAM(s) IV Intermittent every 8 hours  vancomycin  IVPB 1250 milliGRAM(s) IV Intermittent every 12 hours    immunologic:    OTHER:  acetaminophen   Tablet. 1000 milliGRAM(s) Oral every 8 hours  ALBUTerol/ipratropium for Nebulization 3 milliLiter(s) Nebulizer every 6 hours  apixaban 5 milliGRAM(s) Oral every 12 hours  buDESOnide 160 MICROgram(s)/formoterol 4.5 MICROgram(s) Inhaler 2 Puff(s) Inhalation two times a day  chlorhexidine 4% Liquid 1 Application(s) Topical <User Schedule>  dextrose 40% Gel 15 Gram(s) Oral once PRN  dextrose 5%. 1000 milliLiter(s) IV Continuous <Continuous>  dextrose 50% Injectable 12.5 Gram(s) IV Push once  dextrose 50% Injectable 25 Gram(s) IV Push once  dextrose 50% Injectable 25 Gram(s) IV Push once  digoxin     Tablet 0.25 milliGRAM(s) Oral daily  docusate sodium 100 milliGRAM(s) Oral three times a day  glucagon  Injectable 1 milliGRAM(s) IntraMuscular once PRN  insulin glargine Injectable (LANTUS) 12 Unit(s) SubCutaneous at bedtime  insulin lispro (HumaLOG) corrective regimen sliding scale   SubCutaneous three times a day before meals  insulin lispro (HumaLOG) corrective regimen sliding scale   SubCutaneous at bedtime  insulin lispro Injectable (HumaLOG) 4 Unit(s) SubCutaneous three times a day before meals  methylPREDNISolone 4 milliGRAM(s) Oral daily  metoclopramide Injectable 10 milliGRAM(s) IV Push every 6 hours PRN  montelukast 10 milliGRAM(s) Oral daily  morphine  - Injectable 1 milliGRAM(s) IV Push daily PRN  ondansetron Injectable 4 milliGRAM(s) IV Push every 6 hours PRN  pantoprazole    Tablet 40 milliGRAM(s) Oral before breakfast  phenazopyridine 100 milliGRAM(s) Oral every 8 hours  polyethylene glycol 3350 17 Gram(s) Oral every 12 hours  senna 2 Tablet(s) Oral at bedtime  simethicone 160 milliGRAM(s) Chew four times a day PRN  traMADol 25 milliGRAM(s) Oral every 4 hours PRN      Objective:  Vital Signs Last 24 Hrs  T(C): 37 (18 Aug 2018 13:30), Max: 37.1 (17 Aug 2018 21:01)  T(F): 98.6 (18 Aug 2018 13:30), Max: 98.7 (17 Aug 2018 21:01)  HR: 90 (18 Aug 2018 13:30) (77 - 90)  BP: 120/69 (18 Aug 2018 13:30) (120/69 - 129/78)  BP(mean): --  RR: 18 (18 Aug 2018 13:30) (18 - 19)  SpO2: 93% (18 Aug 2018 13:30) (93% - 95%)    PHYSICAL EXAM:  Constitutional:NAD  Eyes:EBER, EOMI  Ear/Nose/Throat: no thrush, mucositis.  Moist mucous membranes	  Neck:no JVD, no lymphadenopathy, supple  Respiratory: CTA barry  Cardiovascular: S1S2 RRR, no murmurs  Gastrointestinal:soft, nontender,  nondistended (+) BS, ostomy with air and small amount of soft brown stools  Extremities:no e/e/c  Skin:  no rashes, open wounds or ulcerations, wound vac in place        LABS:                        9.5    8.25  )-----------( 432      ( 18 Aug 2018 08:28 )             31.3     08-18    140  |  98  |  6<L>  ----------------------------<  100<H>  3.6   |  30  |  0.54    Ca    9.0      18 Aug 2018 07:15  Mg     2.0     08-18                  Vancomycin Level, Trough: 7.0 ug/mL (08-17 @ 22:06)      Rapid RVP Result: [facility]          MICROBIOLOGY:    Culture - Acid Fast - Sputum w/Smear . (08.17.18 @ 00:38)    Specimen Source: .Sputum Sputum    Acid Fast Bacilli Smear:   No acid fast bacilli seen by fluorochrome stain    Culture - Urine (08.16.18 @ 08:50)    -  Levofloxacin: R >4    -  Nitrofurantoin: S <=32 Should not be used to treat pyelonephritis.    -  Tetra/Doxy: R >8    -  Vancomycin: S 2    -  Ampicillin: S <=2 Predicts results to ampicillin/sulbactam, amoxacillin-clavulanate and  piperacillin-tazobactam.    -  Ciprofloxacin: R >2    Specimen Source: .Urine Clean Catch (Midstream)    Culture Results:   10,000 - 49,000 CFU/mL Enterococcus faecalis  <10,000 CFU/ml Normal Urogenital val present    Organism Identification: Enterococcus faecalis    Organism: Enterococcus faecalis    Method Type: GARRETT    Culture - Blood (08.14.18 @ 17:19)    Specimen Source: .Blood Blood-Venous    Culture Results:   No growth to date.    Culture - Blood (08.14.18 @ 17:19)    Specimen Source: .Blood Blood-Peripheral    Culture Results:   No growth to date.          RADIOLOGY & ADDITIONAL STUDIES:

## 2018-08-18 NOTE — PROGRESS NOTE ADULT - ASSESSMENT
68 yo woman with PMH of COPD, ashtma on chronic steroids, tracheobronchomalacia s/p bronchial thermoplasty (10/16), Afib on Eliquis, HTN, adrenal insufficiency, T2DM, DVT, SCC anus (dx 2016, s/p chemo rad s/p APR 7/24/18) presenting from rehabilitation with fevers from presumed Gram Negative Bacterial PNA

## 2018-08-18 NOTE — PROGRESS NOTE ADULT - PROBLEM SELECTOR PLAN 1
ct scan showing worsening opacity in RLL, ct chest showing possibility of DIEUDONNE infection, tree in bud opacities  c/w cefepime 2gm IV q8hr (8/14- and vancomycin 1gm q12hr ( 8/15-,   Vanco trough subtherapeutic, will increase to 1250 BID  urine legionella neg can d/c azithromycin   c/w chest pt , acapella, duonebs, aspiration precautions  appreciate pulm recs  afb x 3 ( instructed nursed to retrieve in am) 8/16 pending  repeat sputum bcx 8/16 pending  bcx ngtd  appreciate pulm, id recs

## 2018-08-18 NOTE — PROGRESS NOTE ADULT - PROBLEM SELECTOR PLAN 3
- pt has history of paraxoysmal Afib  - continue with eliquis 5 mg bid  - continue with digoxin. 25 mg daily

## 2018-08-18 NOTE — PROGRESS NOTE ADULT - ATTENDING COMMENTS
Andre Reyes, M.D.  [facility] Medicine Attending  Office: [phone]   Pager: [phone]

## 2018-08-18 NOTE — PROGRESS NOTE ADULT - SUBJECTIVE AND OBJECTIVE BOX
Patient is a 67y old  Female who presents with a chief complaint of UTI (14 Aug 2018 21:17)      SUBJECTIVE / OVERNIGHT EVENTS:    No acute overnight events. Pt c/o nausea. She also c/o poor pain control, she states that her pain at times is controlled but then at other times when she has pain she the frequency at which her tramadol is written is too far apart to adequately control her pain.     ROS: ( - ) Fever, ( - )Chills,  ( + )Nausea ( - ) Vomiting, ( - ) Cough, ( - )Shortness of breath, ( - )Chest Pain    MEDICATIONS  (STANDING):  acetaminophen   Tablet. 1000 milliGRAM(s) Oral every 8 hours  ALBUTerol/ipratropium for Nebulization 3 milliLiter(s) Nebulizer every 6 hours  apixaban 5 milliGRAM(s) Oral every 12 hours  buDESOnide 160 MICROgram(s)/formoterol 4.5 MICROgram(s) Inhaler 2 Puff(s) Inhalation two times a day  cefepime   IVPB 2000 milliGRAM(s) IV Intermittent every 8 hours  chlorhexidine 4% Liquid 1 Application(s) Topical <User Schedule>  dextrose 5%. 1000 milliLiter(s) (50 mL/Hr) IV Continuous <Continuous>  dextrose 50% Injectable 12.5 Gram(s) IV Push once  dextrose 50% Injectable 25 Gram(s) IV Push once  dextrose 50% Injectable 25 Gram(s) IV Push once  digoxin     Tablet 0.25 milliGRAM(s) Oral daily  docusate sodium 100 milliGRAM(s) Oral three times a day  insulin glargine Injectable (LANTUS) 12 Unit(s) SubCutaneous at bedtime  insulin lispro (HumaLOG) corrective regimen sliding scale   SubCutaneous three times a day before meals  insulin lispro (HumaLOG) corrective regimen sliding scale   SubCutaneous at bedtime  insulin lispro Injectable (HumaLOG) 4 Unit(s) SubCutaneous three times a day before meals  methylPREDNISolone 4 milliGRAM(s) Oral daily  montelukast 10 milliGRAM(s) Oral daily  pantoprazole    Tablet 40 milliGRAM(s) Oral before breakfast  phenazopyridine 100 milliGRAM(s) Oral every 8 hours  polyethylene glycol 3350 17 Gram(s) Oral every 12 hours  senna 2 Tablet(s) Oral at bedtime  vancomycin  IVPB 1250 milliGRAM(s) IV Intermittent every 12 hours    MEDICATIONS  (PRN):  dextrose 40% Gel 15 Gram(s) Oral once PRN Blood Glucose LESS THAN 70 milliGRAM(s)/deciliter  glucagon  Injectable 1 milliGRAM(s) IntraMuscular once PRN Glucose LESS THAN 70 milligrams/deciliter  metoclopramide Injectable 10 milliGRAM(s) IV Push every 6 hours PRN nausea  morphine  - Injectable 1 milliGRAM(s) IV Push daily PRN wound vac/ dressing changes  ondansetron Injectable 4 milliGRAM(s) IV Push every 6 hours PRN Nausea and/or Vomiting  simethicone 160 milliGRAM(s) Chew four times a day PRN Gas  traMADol 25 milliGRAM(s) Oral every 4 hours PRN Severe Pain (7 - 10)      T(C): 37 (08-18 @ 13:30), Max: 37.1 (08-17 @ 21:01)   HR: 90   BP: 120/69   RR: 18   SpO2: 93%    PHYSICAL EXAM:  GENERAL: NAD, well-developed  CHEST/LUNG: Clear to auscultation bilaterally; No wheeze  HEART: Regular rate and rhythm; No murmurs, rubs, or gallops  ABDOMEN: Soft, mild midline tender, Nondistended; small amount of output in ostomy with gas present in the bag. Bowel sounds present  EXTREMITIES:  2+ Peripheral Pulses, No clubbing, cyanosis, or edema  PSYCH: AAOx3  NEUROLOGY: non-focal    LABS:                        9.5    8.25  )-----------( 432      ( 18 Aug 2018 08:28 )             31.3      08-18    140  |  98  |  6<L>  ----------------------------<  100<H>  3.6   |  30  |  0.54    Ca    9.0      18 Aug 2018 07:15  Mg     2.0     08-18         CAPILLARY BLOOD GLUCOSE      POCT Blood Glucose.: 229 mg/dL (18 Aug 2018 12:11)  POCT Blood Glucose.: 161 mg/dL (18 Aug 2018 08:04)  POCT Blood Glucose.: 160 mg/dL (17 Aug 2018 21:35)  POCT Blood Glucose.: 212 mg/dL (17 Aug 2018 19:25)  POCT Blood Glucose.: 247 mg/dL (17 Aug 2018 18:13)  POCT Blood Glucose.: 256 mg/dL (17 Aug 2018 16:36)      RADIOLOGY & ADDITIONAL TESTS:    Imaging Personally Reviewed:  Consultant(s) Notes Reviewed:    Care Discussed with Consultants/Other Providers:

## 2018-08-18 NOTE — PROGRESS NOTE ADULT - SUBJECTIVE AND OBJECTIVE BOX
PULMONARY FOLLOW UP NOTE - COVERING FOR DR SPRING	    Interval Events: Patient was seen and examined at bedside. No overnight events.    REVIEW OF SYSTEMS:  Constitutional: no feves  CV: no chest pain, no palpitations  Resp:+ cough  [X ] All other systems negative  [ ] Unable to assess ROS because ________    OBJECTIVE:  ICU Vital Signs Last 24 Hrs  T(C): 37 (18 Aug 2018 13:30), Max: 37.1 (17 Aug 2018 21:01)  T(F): 98.6 (18 Aug 2018 13:30), Max: 98.7 (17 Aug 2018 21:01)  HR: 90 (18 Aug 2018 13:30) (77 - 90)  BP: 120/69 (18 Aug 2018 13:30) (120/69 - 129/78)  RR: 18 (18 Aug 2018 13:30) (18 - 19)  SpO2: 93% (18 Aug 2018 13:30) (93% - 95%)        08-17 @ 07:01  -  08-18 @ 07:00  --------------------------------------------------------  IN: 540 mL / OUT: 2020 mL / NET: -1480 mL    08-18 @ 07:01  -  08-18 @ 17:42  --------------------------------------------------------  IN: 360 mL / OUT: 450 mL / NET: -90 mL      CAPILLARY BLOOD GLUCOSE      POCT Blood Glucose.: 120 mg/dL (18 Aug 2018 17:00)      PHYSICAL EXAM:  General: nad  HEENT: op clear  Lymph Nodes: No palpable lymphadenopathy  Respiratory: RLL crackles/rhonchi  Cardiovascular: rrr  Abdomen: SNT, +BS, No R/G/R  Extremities: no edema  Skin: no rash  Neurological: grossly intact      HOSPITAL MEDICATIONS:  MEDICATIONS  (STANDING):  acetaminophen   Tablet. 1000 milliGRAM(s) Oral every 8 hours  ALBUTerol/ipratropium for Nebulization 3 milliLiter(s) Nebulizer every 6 hours  apixaban 5 milliGRAM(s) Oral every 12 hours  buDESOnide 160 MICROgram(s)/formoterol 4.5 MICROgram(s) Inhaler 2 Puff(s) Inhalation two times a day  cefepime   IVPB 2000 milliGRAM(s) IV Intermittent every 8 hours  chlorhexidine 4% Liquid 1 Application(s) Topical <User Schedule>  dextrose 5%. 1000 milliLiter(s) (50 mL/Hr) IV Continuous <Continuous>  dextrose 50% Injectable 12.5 Gram(s) IV Push once  dextrose 50% Injectable 25 Gram(s) IV Push once  dextrose 50% Injectable 25 Gram(s) IV Push once  digoxin     Tablet 0.25 milliGRAM(s) Oral daily  docusate sodium 100 milliGRAM(s) Oral three times a day  insulin glargine Injectable (LANTUS) 12 Unit(s) SubCutaneous at bedtime  insulin lispro (HumaLOG) corrective regimen sliding scale   SubCutaneous three times a day before meals  insulin lispro (HumaLOG) corrective regimen sliding scale   SubCutaneous at bedtime  insulin lispro Injectable (HumaLOG) 4 Unit(s) SubCutaneous three times a day before meals  methylPREDNISolone 4 milliGRAM(s) Oral daily  montelukast 10 milliGRAM(s) Oral daily  pantoprazole    Tablet 40 milliGRAM(s) Oral before breakfast  phenazopyridine 100 milliGRAM(s) Oral every 8 hours  polyethylene glycol 3350 17 Gram(s) Oral every 12 hours  senna 2 Tablet(s) Oral at bedtime  vancomycin  IVPB 1250 milliGRAM(s) IV Intermittent every 12 hours    MEDICATIONS  (PRN):  dextrose 40% Gel 15 Gram(s) Oral once PRN Blood Glucose LESS THAN 70 milliGRAM(s)/deciliter  glucagon  Injectable 1 milliGRAM(s) IntraMuscular once PRN Glucose LESS THAN 70 milligrams/deciliter  metoclopramide Injectable 10 milliGRAM(s) IV Push every 6 hours PRN nausea  morphine  - Injectable 1 milliGRAM(s) IV Push daily PRN wound vac/ dressing changes  ondansetron Injectable 4 milliGRAM(s) IV Push every 6 hours PRN Nausea and/or Vomiting  simethicone 160 milliGRAM(s) Chew four times a day PRN Gas  traMADol 25 milliGRAM(s) Oral every 4 hours PRN Severe Pain (7 - 10)      LABS:                        9.5    8.25  )-----------( 432      ( 18 Aug 2018 08:28 )             31.3     Hgb Trend: 9.5<--, 9.9<--, 9.5<--, 9.4<--, 9.6<--  08-18    140  |  98  |  6<L>  ----------------------------<  100<H>  3.6   |  30  |  0.54    Ca    9.0      18 Aug 2018 07:15  Mg     2.0     08-18      Creatinine Trend: 0.54<--, 0.55<--, 0.62<--, 0.67<--, 0.67<--, 0.60<--    MICROBIOLOGY:     RADIOLOGY:  [x ] Reviewed and interpreted by me    < from: CT Chest No Cont (08.15.18 @ 18:08) >  IMPRESSION:     1.  Right lower lobe pneumonia. Recommend follow-up in 3-4 weeks after treatment.  2.  The main pulmonary artery is mildly dilated. Recommend correlation with echocardiogram.    < end of copied text >

## 2018-08-18 NOTE — PROGRESS NOTE ADULT - ASSESSMENT
66 yo woman with PMH of COPD, ashtma on chronic steroids, tracheobronchomalacia s/p bronchial thermoplasty (10/16), Afib on Eliquis, HTN, adrenal insufficiency, T2DM, DVT, SCC anus (dx 2016, s/p chemo rad s/p APR 7/24/18) presenting from rehabiliation with fevers, found to have multifocal PNA  -- c/w broad spectrum abx  -- supplemental O2, mantain sats >90%  -- c/w Symbicort, singular nebs  -- Incentive spirometry, Acapella, OOB to chair  --will need repeat CT in 6-8 weeks to document resolution of infiltrates    Dr Allison to follow on Monday

## 2018-08-18 NOTE — PROGRESS NOTE ADULT - ASSESSMENT
The patient is a 68 yo woman with PMH of COPD, ashtma on chronic steroids, tracheobronchomalacia s/p bronchial thermoplasty (10/16), Afib on Eliquis, HTN, adrenal insufficiency, T2DM, DVT, SCC anus (dx 2016, s/p chemo rad s/p APR 7/24/18) presenting from rehabiliation with fevers. The patient stated she felt subjective fevers yesterday to T max 100.2 this morning, she was informed given she is on steroids T > 99 would be fever, prompting her to come to the ED. She is denying any chills URI symptoms of rhinorrhea, sore throat, dysphagia. She has cough that is sometimes productive with greenish phlegm however she has had it for several years. She feels increased SOB non-exertional from decreased inspiratory effort. During patient's previous admission end of July 2018 she developed fevers with positive urine culture. She was treated with 7 day course of ceftriaxone. She is reporting continued dysuria, increased frequency, incontinence. The patient ostomy has not emptied since she was in rehabilitation, she is citing functioing during previous admission prior to rehabilitation. She is denying any sick contacts.    Pt states she has an open draining wound in the perineum, for which she had a wound vac that is now off.  Drainage is malodorous.     Problem/Plan - 1:    ·	HCAP/aspiration pna    - CT chest with multifocal R sided pna.   Agree with broad spectrum abx coverage with vanco/cefepime given recent hospitalization.  Recent sputum cx grew pseudomonas    - Check repeat sputum cx.  Legionella was negative    - f/u blood cultures    - r/o DIEUDONNE.  f/u sputum AFB x 3.      Problem/Plan - 2:    ·	UTI    - (+) UA with urinary symptoms.  Budding yeast seen on UA, urine cultures negative for candida, diflucan d/c'd.  Urine cultures with E.faecalis, sensitive to vanco/ampicillin.  cont present abx.  clinically improving        Problem/Plan - 3:    ·	Open perineal wound    - Pt with recent anal SCC and resection.  s/p wound care evaluation for wound vac/topical care recommendations    - Colorectal surgery f/u    Will follow    Joselyn Esteban  [phone]

## 2018-08-18 NOTE — PROGRESS NOTE ADULT - PROBLEM SELECTOR PLAN 8
- Insulin Sliding Scale coverage  - A1c - 7.6  - c/w basal-bolus insulin regimen: Lantus 12u qhs and Humalog 4u tid ac  monitor FS, currently they are above goal of <180

## 2018-08-19 DIAGNOSIS — R10.84 GENERALIZED ABDOMINAL PAIN: ICD-10-CM

## 2018-08-19 LAB
ANION GAP SERPL CALC-SCNC: 12 MMOL/L — SIGNIFICANT CHANGE UP (ref 5–17)
BUN SERPL-MCNC: 6 MG/DL — LOW (ref 7–23)
CALCIUM SERPL-MCNC: 9 MG/DL — SIGNIFICANT CHANGE UP (ref 8.4–10.5)
CHLORIDE SERPL-SCNC: 100 MMOL/L — SIGNIFICANT CHANGE UP (ref 96–108)
CO2 SERPL-SCNC: 29 MMOL/L — SIGNIFICANT CHANGE UP (ref 22–31)
CREAT SERPL-MCNC: 0.54 MG/DL — SIGNIFICANT CHANGE UP (ref 0.5–1.3)
CULTURE RESULTS: SIGNIFICANT CHANGE UP
CULTURE RESULTS: SIGNIFICANT CHANGE UP
GLUCOSE BLDC GLUCOMTR-MCNC: 116 MG/DL — HIGH (ref 70–99)
GLUCOSE BLDC GLUCOMTR-MCNC: 126 MG/DL — HIGH (ref 70–99)
GLUCOSE BLDC GLUCOMTR-MCNC: 177 MG/DL — HIGH (ref 70–99)
GLUCOSE BLDC GLUCOMTR-MCNC: 307 MG/DL — HIGH (ref 70–99)
GLUCOSE BLDC GLUCOMTR-MCNC: 89 MG/DL — SIGNIFICANT CHANGE UP (ref 70–99)
GLUCOSE SERPL-MCNC: 104 MG/DL — HIGH (ref 70–99)
HCT VFR BLD CALC: 30.4 % — LOW (ref 34.5–45)
HGB BLD-MCNC: 9.3 G/DL — LOW (ref 11.5–15.5)
MCHC RBC-ENTMCNC: 22.2 PG — LOW (ref 27–34)
MCHC RBC-ENTMCNC: 30.6 GM/DL — LOW (ref 32–36)
MCV RBC AUTO: 72.6 FL — LOW (ref 80–100)
PLATELET # BLD AUTO: 391 K/UL — SIGNIFICANT CHANGE UP (ref 150–400)
POTASSIUM SERPL-MCNC: 3.8 MMOL/L — SIGNIFICANT CHANGE UP (ref 3.5–5.3)
POTASSIUM SERPL-SCNC: 3.8 MMOL/L — SIGNIFICANT CHANGE UP (ref 3.5–5.3)
RBC # BLD: 4.19 M/UL — SIGNIFICANT CHANGE UP (ref 3.8–5.2)
RBC # FLD: 16.3 % — HIGH (ref 10.3–14.5)
SODIUM SERPL-SCNC: 141 MMOL/L — SIGNIFICANT CHANGE UP (ref 135–145)
SPECIMEN SOURCE: SIGNIFICANT CHANGE UP
SPECIMEN SOURCE: SIGNIFICANT CHANGE UP
WBC # BLD: 5.36 K/UL — SIGNIFICANT CHANGE UP (ref 3.8–10.5)
WBC # FLD AUTO: 5.36 K/UL — SIGNIFICANT CHANGE UP (ref 3.8–10.5)

## 2018-08-19 PROCEDURE — 99233 SBSQ HOSP IP/OBS HIGH 50: CPT

## 2018-08-19 PROCEDURE — 99233 SBSQ HOSP IP/OBS HIGH 50: CPT | Mod: 24

## 2018-08-19 RX ORDER — DIPHENHYDRAMINE HCL 50 MG
25 CAPSULE ORAL EVERY 8 HOURS
Qty: 0 | Refills: 0 | Status: DISCONTINUED | OUTPATIENT
Start: 2018-08-19 | End: 2018-08-23

## 2018-08-19 RX ORDER — LANOLIN ALCOHOL/MO/W.PET/CERES
3 CREAM (GRAM) TOPICAL AT BEDTIME
Qty: 0 | Refills: 0 | Status: COMPLETED | OUTPATIENT
Start: 2018-08-19 | End: 2018-08-19

## 2018-08-19 RX ORDER — DIPHENHYDRAMINE HCL 50 MG
25 CAPSULE ORAL ONCE
Qty: 0 | Refills: 0 | Status: COMPLETED | OUTPATIENT
Start: 2018-08-19 | End: 2018-08-19

## 2018-08-19 RX ORDER — VANCOMYCIN HCL 1 G
1250 VIAL (EA) INTRAVENOUS EVERY 12 HOURS
Qty: 0 | Refills: 0 | Status: DISCONTINUED | OUTPATIENT
Start: 2018-08-19 | End: 2018-08-22

## 2018-08-19 RX ADMIN — Medication 100 MILLIGRAM(S): at 22:32

## 2018-08-19 RX ADMIN — SIMETHICONE 160 MILLIGRAM(S): 80 TABLET, CHEWABLE ORAL at 19:29

## 2018-08-19 RX ADMIN — CHLORHEXIDINE GLUCONATE 1 APPLICATION(S): 213 SOLUTION TOPICAL at 08:30

## 2018-08-19 RX ADMIN — APIXABAN 5 MILLIGRAM(S): 2.5 TABLET, FILM COATED ORAL at 22:32

## 2018-08-19 RX ADMIN — Medication 166.67 MILLIGRAM(S): at 23:51

## 2018-08-19 RX ADMIN — APIXABAN 5 MILLIGRAM(S): 2.5 TABLET, FILM COATED ORAL at 11:45

## 2018-08-19 RX ADMIN — Medication 0.25 MILLIGRAM(S): at 06:43

## 2018-08-19 RX ADMIN — INSULIN GLARGINE 12 UNIT(S): 100 INJECTION, SOLUTION SUBCUTANEOUS at 22:04

## 2018-08-19 RX ADMIN — Medication 3 MILLILITER(S): at 17:19

## 2018-08-19 RX ADMIN — Medication 100 MILLIGRAM(S): at 06:43

## 2018-08-19 RX ADMIN — Medication 3 MILLIGRAM(S): at 23:50

## 2018-08-19 RX ADMIN — PANTOPRAZOLE SODIUM 40 MILLIGRAM(S): 20 TABLET, DELAYED RELEASE ORAL at 06:43

## 2018-08-19 RX ADMIN — Medication 4: at 13:09

## 2018-08-19 RX ADMIN — Medication 3 MILLILITER(S): at 13:11

## 2018-08-19 RX ADMIN — Medication 1000 MILLIGRAM(S): at 17:19

## 2018-08-19 RX ADMIN — Medication 100 MILLIGRAM(S): at 13:11

## 2018-08-19 RX ADMIN — SENNA PLUS 2 TABLET(S): 8.6 TABLET ORAL at 22:32

## 2018-08-19 RX ADMIN — CEFEPIME 100 MILLIGRAM(S): 1 INJECTION, POWDER, FOR SOLUTION INTRAMUSCULAR; INTRAVENOUS at 08:08

## 2018-08-19 RX ADMIN — CEFEPIME 100 MILLIGRAM(S): 1 INJECTION, POWDER, FOR SOLUTION INTRAMUSCULAR; INTRAVENOUS at 14:53

## 2018-08-19 RX ADMIN — Medication 4 UNIT(S): at 17:18

## 2018-08-19 RX ADMIN — MONTELUKAST 10 MILLIGRAM(S): 4 TABLET, CHEWABLE ORAL at 11:49

## 2018-08-19 RX ADMIN — BUDESONIDE AND FORMOTEROL FUMARATE DIHYDRATE 2 PUFF(S): 160; 4.5 AEROSOL RESPIRATORY (INHALATION) at 17:19

## 2018-08-19 RX ADMIN — Medication 25 MILLIGRAM(S): at 04:45

## 2018-08-19 RX ADMIN — Medication 4 MILLIGRAM(S): at 06:43

## 2018-08-19 RX ADMIN — Medication 166.67 MILLIGRAM(S): at 11:49

## 2018-08-19 RX ADMIN — BUDESONIDE AND FORMOTEROL FUMARATE DIHYDRATE 2 PUFF(S): 160; 4.5 AEROSOL RESPIRATORY (INHALATION) at 06:43

## 2018-08-19 RX ADMIN — Medication 1000 MILLIGRAM(S): at 18:58

## 2018-08-19 RX ADMIN — Medication 3 MILLILITER(S): at 23:51

## 2018-08-19 RX ADMIN — POLYETHYLENE GLYCOL 3350 17 GRAM(S): 17 POWDER, FOR SOLUTION ORAL at 11:45

## 2018-08-19 RX ADMIN — Medication 10 MILLIGRAM(S): at 21:55

## 2018-08-19 RX ADMIN — Medication 3 MILLILITER(S): at 06:43

## 2018-08-19 RX ADMIN — Medication 4 UNIT(S): at 08:30

## 2018-08-19 RX ADMIN — Medication 4 UNIT(S): at 13:09

## 2018-08-19 RX ADMIN — CEFEPIME 100 MILLIGRAM(S): 1 INJECTION, POWDER, FOR SOLUTION INTRAMUSCULAR; INTRAVENOUS at 22:00

## 2018-08-19 NOTE — PROGRESS NOTE ADULT - PROBLEM SELECTOR PLAN 6
c/w senna, colace, miralax, lactulose  abd xray 8/16 showing ileus  lactulose daily  reglan added for promotility, Enema through ostomy done by CRC yesterday with successful increase in stool output.  will advance diet to full liquid diet, continue to advance as tolerated

## 2018-08-19 NOTE — PROGRESS NOTE ADULT - ASSESSMENT
Assessment  67F s/p APR on 7/24 for rectal cancer, discharged to rehab, now admitted for pneumonia and UTI.    - Continue current bowel regimen (senna, colace, miralax)  - Care of UTI and pneumonia per medicine and pulmonary  - Please record ostomy output  - PT wound care consult for vac to perineal wound  - Colorectal surgery will continue to follow

## 2018-08-19 NOTE — PROGRESS NOTE ADULT - ATTENDING COMMENTS
Andre Reyes, M.D.  [facility] Medicine Attending  Office: [phone]   Pager: [phone]

## 2018-08-19 NOTE — PROGRESS NOTE ADULT - SUBJECTIVE AND OBJECTIVE BOX
[facility] RED SURGERY DAILY PROGRESS NOTE    Subjective:  Patient seen and examined on morning rounds.   No events overnight.    MEDICATIONS  (STANDING):  acetaminophen   Tablet. 1000 milliGRAM(s) Oral every 8 hours  ALBUTerol/ipratropium for Nebulization 3 milliLiter(s) Nebulizer every 6 hours  apixaban 5 milliGRAM(s) Oral every 12 hours  buDESOnide 160 MICROgram(s)/formoterol 4.5 MICROgram(s) Inhaler 2 Puff(s) Inhalation two times a day  cefepime   IVPB 2000 milliGRAM(s) IV Intermittent every 8 hours  chlorhexidine 4% Liquid 1 Application(s) Topical <User Schedule>  dextrose 5%. 1000 milliLiter(s) (50 mL/Hr) IV Continuous <Continuous>  dextrose 50% Injectable 12.5 Gram(s) IV Push once  dextrose 50% Injectable 25 Gram(s) IV Push once  dextrose 50% Injectable 25 Gram(s) IV Push once  digoxin     Tablet 0.25 milliGRAM(s) Oral daily  docusate sodium 100 milliGRAM(s) Oral three times a day  insulin glargine Injectable (LANTUS) 12 Unit(s) SubCutaneous at bedtime  insulin lispro (HumaLOG) corrective regimen sliding scale   SubCutaneous three times a day before meals  insulin lispro (HumaLOG) corrective regimen sliding scale   SubCutaneous at bedtime  insulin lispro Injectable (HumaLOG) 4 Unit(s) SubCutaneous three times a day before meals  methylPREDNISolone 4 milliGRAM(s) Oral daily  montelukast 10 milliGRAM(s) Oral daily  pantoprazole    Tablet 40 milliGRAM(s) Oral before breakfast  polyethylene glycol 3350 17 Gram(s) Oral every 12 hours  senna 2 Tablet(s) Oral at bedtime  vancomycin  IVPB 1250 milliGRAM(s) IV Intermittent every 12 hours    MEDICATIONS  (PRN):  dextrose 40% Gel 15 Gram(s) Oral once PRN Blood Glucose LESS THAN 70 milliGRAM(s)/deciliter  glucagon  Injectable 1 milliGRAM(s) IntraMuscular once PRN Glucose LESS THAN 70 milligrams/deciliter  metoclopramide Injectable 10 milliGRAM(s) IV Push every 6 hours PRN nausea  morphine  - Injectable 1 milliGRAM(s) IV Push daily PRN wound vac/ dressing changes  ondansetron Injectable 4 milliGRAM(s) IV Push every 6 hours PRN Nausea and/or Vomiting  simethicone 160 milliGRAM(s) Chew four times a day PRN Gas  traMADol 25 milliGRAM(s) Oral every 4 hours PRN Severe Pain (7 - 10)    Vital Signs Last 24 Hrs  T(C): 37 (19 Aug 2018 05:30), Max: 37.1 (18 Aug 2018 20:09)  T(F): 98.6 (19 Aug 2018 05:30), Max: 98.8 (18 Aug 2018 20:09)  HR: 69 (19 Aug 2018 05:30) (69 - 90)  BP: 117/71 (19 Aug 2018 05:30) (117/71 - 126/71)  BP(mean): --  RR: 18 (19 Aug 2018 05:30) (18 - 18)  SpO2: 94% (19 Aug 2018 05:30) (93% - 94%)  I&O's Detail    18 Aug 2018 07:01  -  19 Aug 2018 07:00  --------------------------------------------------------  IN:    IV PiggyBack: 300 mL    Oral Fluid: 360 mL  Total IN: 660 mL    OUT:    Colostomy: 250 mL    Voided: 500 mL  Total OUT: 750 mL    Total NET: -90 mL        Daily     LABS:                        9.3    5.36  )-----------( 391      ( 19 Aug 2018 09:21 )             30.4     08-19    141  |  100  |  6<L>  ----------------------------<  104<H>  3.8   |  29  |  0.54    Ca    9.0      19 Aug 2018 07:15  Mg     2.0     08-18    Physical Exam:   Gen: NAD  CVS: RRR  Respiratory: CTA B/L  Abdomen: soft, NT, ND. Slight TTP below umbilicus with no visible erythema or rash. Ostomy pink/viable with gas in bag, some mucus around stoma  Ext: no edema, gross sensation intact, gross motor function intact

## 2018-08-19 NOTE — PROGRESS NOTE ADULT - PROBLEM SELECTOR PLAN 1
ct scan showing worsening opacity in RLL, ct chest showing possibility of DIEUDONNE infection, tree in bud opacities  c/w cefepime 2gm IV q8hr (8/14- and vancomycin 1gm q12hr ( 8/15-,   Vanco trough subtherapeutic, increased to 1250 BID will need to repeat a trough   urine legionella neg can d/c azithromycin   c/w chest pt , acapella, duonebs, aspiration precautions  appreciate pulm recs  afb x 3 ( instructed nursed to retrieve in am) 8/16 pending  repeat sputum bcx 8/16 pending  bcx ngtd  appreciate pulm, id recs

## 2018-08-19 NOTE — PROGRESS NOTE ADULT - PROBLEM SELECTOR PLAN 8
- Insulin Sliding Scale coverage  - A1c - 7.6  - c/w basal-bolus insulin regimen: Lantus 12u qhs and Humalog 4u tid ac  monitor FS, currently they are at goal of <180 (116, 103, 120, 229)

## 2018-08-19 NOTE — PROGRESS NOTE ADULT - SUBJECTIVE AND OBJECTIVE BOX
Patient is a 67y old  Female who presents with a chief complaint of UTI (14 Aug 2018 21:17)      SUBJECTIVE / OVERNIGHT EVENTS:    No acute overnight events. Having increased output from ostomy after yesterdays enema. Nausea has improved but pt states that is worried about starting a Regular diet.     ROS: ( - ) Fever, ( - )Chills,  ( - )Nausea/Vomiting, ( - ) Cough, ( - )Shortness of breath, ( - )Chest Pain    MEDICATIONS  (STANDING):  acetaminophen   Tablet. 1000 milliGRAM(s) Oral every 8 hours  ALBUTerol/ipratropium for Nebulization 3 milliLiter(s) Nebulizer every 6 hours  apixaban 5 milliGRAM(s) Oral every 12 hours  buDESOnide 160 MICROgram(s)/formoterol 4.5 MICROgram(s) Inhaler 2 Puff(s) Inhalation two times a day  cefepime   IVPB 2000 milliGRAM(s) IV Intermittent every 8 hours  chlorhexidine 4% Liquid 1 Application(s) Topical <User Schedule>  dextrose 5%. 1000 milliLiter(s) (50 mL/Hr) IV Continuous <Continuous>  dextrose 50% Injectable 12.5 Gram(s) IV Push once  dextrose 50% Injectable 25 Gram(s) IV Push once  dextrose 50% Injectable 25 Gram(s) IV Push once  digoxin     Tablet 0.25 milliGRAM(s) Oral daily  docusate sodium 100 milliGRAM(s) Oral three times a day  insulin glargine Injectable (LANTUS) 12 Unit(s) SubCutaneous at bedtime  insulin lispro (HumaLOG) corrective regimen sliding scale   SubCutaneous three times a day before meals  insulin lispro (HumaLOG) corrective regimen sliding scale   SubCutaneous at bedtime  insulin lispro Injectable (HumaLOG) 4 Unit(s) SubCutaneous three times a day before meals  methylPREDNISolone 4 milliGRAM(s) Oral daily  montelukast 10 milliGRAM(s) Oral daily  pantoprazole    Tablet 40 milliGRAM(s) Oral before breakfast  polyethylene glycol 3350 17 Gram(s) Oral every 12 hours  senna 2 Tablet(s) Oral at bedtime  vancomycin  IVPB 1250 milliGRAM(s) IV Intermittent every 12 hours    MEDICATIONS  (PRN):  dextrose 40% Gel 15 Gram(s) Oral once PRN Blood Glucose LESS THAN 70 milliGRAM(s)/deciliter  diphenhydrAMINE   Capsule 25 milliGRAM(s) Oral every 8 hours PRN Rash and/or Itching  glucagon  Injectable 1 milliGRAM(s) IntraMuscular once PRN Glucose LESS THAN 70 milligrams/deciliter  metoclopramide Injectable 10 milliGRAM(s) IV Push every 6 hours PRN nausea  morphine  - Injectable 1 milliGRAM(s) IV Push daily PRN wound vac/ dressing changes  ondansetron Injectable 4 milliGRAM(s) IV Push every 6 hours PRN Nausea and/or Vomiting  simethicone 160 milliGRAM(s) Chew four times a day PRN Gas  traMADol 25 milliGRAM(s) Oral every 4 hours PRN Severe Pain (7 - 10)      T(C): 37.2 (08-19 @ 13:08), Max: 37.2 (08-19 @ 13:08)   HR: 94   BP: 108/69   RR: 18   SpO2: 92%    PHYSICAL EXAM:  GENERAL: NAD, well-developed  CHEST/LUNG: Clear to auscultation bilaterally; No wheeze  HEART: Regular rate and rhythm; No murmurs, rubs, or gallops  ABDOMEN: Soft, mild midline tender, Nondistended; small amount of output in ostomy with gas present in the bag. Bowel sounds present  EXTREMITIES:  2+ Peripheral Pulses, No clubbing, cyanosis, or edema  PSYCH: AAOx3  NEUROLOGY: non-focal    LABS:                        9.3    5.36  )-----------( 391      ( 19 Aug 2018 09:21 )             30.4      08-19    141  |  100  |  6<L>  ----------------------------<  104<H>  3.8   |  29  |  0.54    Ca    9.0      19 Aug 2018 07:15  Mg     2.0     08-18         CAPILLARY BLOOD GLUCOSE      POCT Blood Glucose.: 307 mg/dL (19 Aug 2018 13:06)  POCT Blood Glucose.: 177 mg/dL (19 Aug 2018 11:27)  POCT Blood Glucose.: 116 mg/dL (19 Aug 2018 07:57)  POCT Blood Glucose.: 103 mg/dL (18 Aug 2018 21:35)  POCT Blood Glucose.: 120 mg/dL (18 Aug 2018 17:00)      RADIOLOGY & ADDITIONAL TESTS:    Imaging Personally Reviewed:  Consultant(s) Notes Reviewed:    Care Discussed with Consultants/Other Providers: Patient is a 67y old  Female who presents with a chief complaint of UTI (14 Aug 2018 21:17)      SUBJECTIVE / OVERNIGHT EVENTS:    No acute overnight events. Having increased output from ostomy after yesterdays enema. Nausea has improved but pt states that is worried about starting a Regular diet.   Abd pain improved with pain regimen adjustment.    ROS: ( - ) Fever, ( - )Chills,  ( - )Nausea/Vomiting, ( - ) Cough, ( - )Shortness of breath, ( - )Chest Pain    MEDICATIONS  (STANDING):  acetaminophen   Tablet. 1000 milliGRAM(s) Oral every 8 hours  ALBUTerol/ipratropium for Nebulization 3 milliLiter(s) Nebulizer every 6 hours  apixaban 5 milliGRAM(s) Oral every 12 hours  buDESOnide 160 MICROgram(s)/formoterol 4.5 MICROgram(s) Inhaler 2 Puff(s) Inhalation two times a day  cefepime   IVPB 2000 milliGRAM(s) IV Intermittent every 8 hours  chlorhexidine 4% Liquid 1 Application(s) Topical <User Schedule>  dextrose 5%. 1000 milliLiter(s) (50 mL/Hr) IV Continuous <Continuous>  dextrose 50% Injectable 12.5 Gram(s) IV Push once  dextrose 50% Injectable 25 Gram(s) IV Push once  dextrose 50% Injectable 25 Gram(s) IV Push once  digoxin     Tablet 0.25 milliGRAM(s) Oral daily  docusate sodium 100 milliGRAM(s) Oral three times a day  insulin glargine Injectable (LANTUS) 12 Unit(s) SubCutaneous at bedtime  insulin lispro (HumaLOG) corrective regimen sliding scale   SubCutaneous three times a day before meals  insulin lispro (HumaLOG) corrective regimen sliding scale   SubCutaneous at bedtime  insulin lispro Injectable (HumaLOG) 4 Unit(s) SubCutaneous three times a day before meals  methylPREDNISolone 4 milliGRAM(s) Oral daily  montelukast 10 milliGRAM(s) Oral daily  pantoprazole    Tablet 40 milliGRAM(s) Oral before breakfast  polyethylene glycol 3350 17 Gram(s) Oral every 12 hours  senna 2 Tablet(s) Oral at bedtime  vancomycin  IVPB 1250 milliGRAM(s) IV Intermittent every 12 hours    MEDICATIONS  (PRN):  dextrose 40% Gel 15 Gram(s) Oral once PRN Blood Glucose LESS THAN 70 milliGRAM(s)/deciliter  diphenhydrAMINE   Capsule 25 milliGRAM(s) Oral every 8 hours PRN Rash and/or Itching  glucagon  Injectable 1 milliGRAM(s) IntraMuscular once PRN Glucose LESS THAN 70 milligrams/deciliter  metoclopramide Injectable 10 milliGRAM(s) IV Push every 6 hours PRN nausea  morphine  - Injectable 1 milliGRAM(s) IV Push daily PRN wound vac/ dressing changes  ondansetron Injectable 4 milliGRAM(s) IV Push every 6 hours PRN Nausea and/or Vomiting  simethicone 160 milliGRAM(s) Chew four times a day PRN Gas  traMADol 25 milliGRAM(s) Oral every 4 hours PRN Severe Pain (7 - 10)      T(C): 37.2 (08-19 @ 13:08), Max: 37.2 (08-19 @ 13:08)   HR: 94   BP: 108/69   RR: 18   SpO2: 92%    PHYSICAL EXAM:  GENERAL: NAD, well-developed  CHEST/LUNG: Clear to auscultation bilaterally; No wheeze  HEART: Regular rate and rhythm; No murmurs, rubs, or gallops  ABDOMEN: Soft, mild midline tender, Nondistended; small amount of output in ostomy with gas present in the bag. Bowel sounds present  EXTREMITIES:  2+ Peripheral Pulses, No clubbing, cyanosis, or edema  PSYCH: AAOx3  NEUROLOGY: non-focal    LABS:                        9.3    5.36  )-----------( 391      ( 19 Aug 2018 09:21 )             30.4      08-19    141  |  100  |  6<L>  ----------------------------<  104<H>  3.8   |  29  |  0.54    Ca    9.0      19 Aug 2018 07:15  Mg     2.0     08-18         CAPILLARY BLOOD GLUCOSE      POCT Blood Glucose.: 307 mg/dL (19 Aug 2018 13:06)  POCT Blood Glucose.: 177 mg/dL (19 Aug 2018 11:27)  POCT Blood Glucose.: 116 mg/dL (19 Aug 2018 07:57)  POCT Blood Glucose.: 103 mg/dL (18 Aug 2018 21:35)  POCT Blood Glucose.: 120 mg/dL (18 Aug 2018 17:00)      RADIOLOGY & ADDITIONAL TESTS:    Imaging Personally Reviewed:  Consultant(s) Notes Reviewed:    Care Discussed with Consultants/Other Providers:

## 2018-08-19 NOTE — PROGRESS NOTE ADULT - ATTENDING COMMENTS
+ colostomy output since enema yesterday, pt feels somewhat better, only c/o umbilical pain    AAOx3  abd soft, umbilicus unremarkable, colostomy pink, digitized with slight tightness at fascia but gloved finger easily passes through.     A/P constipation  cont BID miralax

## 2018-08-19 NOTE — PROGRESS NOTE ADULT - PROBLEM SELECTOR PLAN 9
Diet: clear liquid with carb consistent  VTE: Eliquis 5 mg bid  PT eval pending, wound vac played pt with generalized abd pain now improved with adjustment of tramadol regimen.  pt is aware that increased tramadol may contribute to constipation so she is limiting the use of it.

## 2018-08-19 NOTE — PROGRESS NOTE ADULT - ASSESSMENT
68 yo woman with PMH of COPD, ashtma on chronic steroids, tracheobronchomalacia s/p bronchial thermoplasty (10/16), Afib on Eliquis, HTN, adrenal insufficiency, T2DM, DVT, SCC anus (dx 2016, s/p chemo rad s/p APR 7/24/18) presenting from rehabilitation with fevers from presumed Gram Negative Bacterial PNA.

## 2018-08-20 LAB
ANION GAP SERPL CALC-SCNC: 11 MMOL/L — SIGNIFICANT CHANGE UP (ref 5–17)
BUN SERPL-MCNC: 6 MG/DL — LOW (ref 7–23)
CALCIUM SERPL-MCNC: 9.5 MG/DL — SIGNIFICANT CHANGE UP (ref 8.4–10.5)
CHLORIDE SERPL-SCNC: 101 MMOL/L — SIGNIFICANT CHANGE UP (ref 96–108)
CO2 SERPL-SCNC: 29 MMOL/L — SIGNIFICANT CHANGE UP (ref 22–31)
CREAT SERPL-MCNC: 0.58 MG/DL — SIGNIFICANT CHANGE UP (ref 0.5–1.3)
GLUCOSE BLDC GLUCOMTR-MCNC: 114 MG/DL — HIGH (ref 70–99)
GLUCOSE BLDC GLUCOMTR-MCNC: 135 MG/DL — HIGH (ref 70–99)
GLUCOSE BLDC GLUCOMTR-MCNC: 144 MG/DL — HIGH (ref 70–99)
GLUCOSE BLDC GLUCOMTR-MCNC: 178 MG/DL — HIGH (ref 70–99)
GLUCOSE BLDC GLUCOMTR-MCNC: 266 MG/DL — HIGH (ref 70–99)
GLUCOSE SERPL-MCNC: 114 MG/DL — HIGH (ref 70–99)
HCT VFR BLD CALC: 30.9 % — LOW (ref 34.5–45)
HGB BLD-MCNC: 9.4 G/DL — LOW (ref 11.5–15.5)
MCHC RBC-ENTMCNC: 22 PG — LOW (ref 27–34)
MCHC RBC-ENTMCNC: 30.4 GM/DL — LOW (ref 32–36)
MCV RBC AUTO: 72.2 FL — LOW (ref 80–100)
PLATELET # BLD AUTO: 436 K/UL — HIGH (ref 150–400)
POTASSIUM SERPL-MCNC: 4 MMOL/L — SIGNIFICANT CHANGE UP (ref 3.5–5.3)
POTASSIUM SERPL-SCNC: 4 MMOL/L — SIGNIFICANT CHANGE UP (ref 3.5–5.3)
RBC # BLD: 4.28 M/UL — SIGNIFICANT CHANGE UP (ref 3.8–5.2)
RBC # FLD: 16 % — HIGH (ref 10.3–14.5)
SODIUM SERPL-SCNC: 141 MMOL/L — SIGNIFICANT CHANGE UP (ref 135–145)
VANCOMYCIN TROUGH SERPL-MCNC: 15.9 UG/ML — SIGNIFICANT CHANGE UP (ref 10–20)
WBC # BLD: 6 K/UL — SIGNIFICANT CHANGE UP (ref 3.8–10.5)
WBC # FLD AUTO: 6 K/UL — SIGNIFICANT CHANGE UP (ref 3.8–10.5)

## 2018-08-20 PROCEDURE — 93010 ELECTROCARDIOGRAM REPORT: CPT

## 2018-08-20 PROCEDURE — 99232 SBSQ HOSP IP/OBS MODERATE 35: CPT

## 2018-08-20 RX ORDER — INSULIN LISPRO 100/ML
4 VIAL (ML) SUBCUTANEOUS
Qty: 0 | Refills: 0 | Status: DISCONTINUED | OUTPATIENT
Start: 2018-08-20 | End: 2018-08-21

## 2018-08-20 RX ORDER — INSULIN LISPRO 100/ML
6 VIAL (ML) SUBCUTANEOUS
Qty: 0 | Refills: 0 | Status: DISCONTINUED | OUTPATIENT
Start: 2018-08-20 | End: 2018-08-21

## 2018-08-20 RX ADMIN — TRAMADOL HYDROCHLORIDE 25 MILLIGRAM(S): 50 TABLET ORAL at 08:21

## 2018-08-20 RX ADMIN — Medication 4 UNIT(S): at 08:17

## 2018-08-20 RX ADMIN — CEFEPIME 100 MILLIGRAM(S): 1 INJECTION, POWDER, FOR SOLUTION INTRAMUSCULAR; INTRAVENOUS at 22:25

## 2018-08-20 RX ADMIN — APIXABAN 5 MILLIGRAM(S): 2.5 TABLET, FILM COATED ORAL at 22:25

## 2018-08-20 RX ADMIN — APIXABAN 5 MILLIGRAM(S): 2.5 TABLET, FILM COATED ORAL at 10:01

## 2018-08-20 RX ADMIN — CEFEPIME 100 MILLIGRAM(S): 1 INJECTION, POWDER, FOR SOLUTION INTRAMUSCULAR; INTRAVENOUS at 06:49

## 2018-08-20 RX ADMIN — MORPHINE SULFATE 1 MILLIGRAM(S): 50 CAPSULE, EXTENDED RELEASE ORAL at 11:45

## 2018-08-20 RX ADMIN — CEFEPIME 100 MILLIGRAM(S): 1 INJECTION, POWDER, FOR SOLUTION INTRAMUSCULAR; INTRAVENOUS at 14:37

## 2018-08-20 RX ADMIN — Medication 4 UNIT(S): at 12:20

## 2018-08-20 RX ADMIN — Medication 166.67 MILLIGRAM(S): at 10:42

## 2018-08-20 RX ADMIN — BUDESONIDE AND FORMOTEROL FUMARATE DIHYDRATE 2 PUFF(S): 160; 4.5 AEROSOL RESPIRATORY (INHALATION) at 18:53

## 2018-08-20 RX ADMIN — PANTOPRAZOLE SODIUM 40 MILLIGRAM(S): 20 TABLET, DELAYED RELEASE ORAL at 06:49

## 2018-08-20 RX ADMIN — Medication 3 MILLILITER(S): at 11:00

## 2018-08-20 RX ADMIN — BUDESONIDE AND FORMOTEROL FUMARATE DIHYDRATE 2 PUFF(S): 160; 4.5 AEROSOL RESPIRATORY (INHALATION) at 06:48

## 2018-08-20 RX ADMIN — INSULIN GLARGINE 12 UNIT(S): 100 INJECTION, SOLUTION SUBCUTANEOUS at 22:24

## 2018-08-20 RX ADMIN — Medication 3 MILLILITER(S): at 06:48

## 2018-08-20 RX ADMIN — Medication 166.67 MILLIGRAM(S): at 22:25

## 2018-08-20 RX ADMIN — Medication 4 UNIT(S): at 17:47

## 2018-08-20 RX ADMIN — MORPHINE SULFATE 1 MILLIGRAM(S): 50 CAPSULE, EXTENDED RELEASE ORAL at 11:15

## 2018-08-20 RX ADMIN — Medication 25 MILLIGRAM(S): at 12:28

## 2018-08-20 RX ADMIN — Medication 25 MILLIGRAM(S): at 20:47

## 2018-08-20 RX ADMIN — Medication 3 MILLILITER(S): at 18:53

## 2018-08-20 RX ADMIN — Medication 3: at 12:19

## 2018-08-20 RX ADMIN — Medication 0.25 MILLIGRAM(S): at 06:49

## 2018-08-20 RX ADMIN — MONTELUKAST 10 MILLIGRAM(S): 4 TABLET, CHEWABLE ORAL at 12:20

## 2018-08-20 RX ADMIN — POLYETHYLENE GLYCOL 3350 17 GRAM(S): 17 POWDER, FOR SOLUTION ORAL at 10:01

## 2018-08-20 RX ADMIN — CHLORHEXIDINE GLUCONATE 1 APPLICATION(S): 213 SOLUTION TOPICAL at 08:17

## 2018-08-20 RX ADMIN — TRAMADOL HYDROCHLORIDE 25 MILLIGRAM(S): 50 TABLET ORAL at 16:55

## 2018-08-20 RX ADMIN — SIMETHICONE 160 MILLIGRAM(S): 80 TABLET, CHEWABLE ORAL at 17:55

## 2018-08-20 RX ADMIN — Medication 4 MILLIGRAM(S): at 06:49

## 2018-08-20 RX ADMIN — SIMETHICONE 160 MILLIGRAM(S): 80 TABLET, CHEWABLE ORAL at 08:53

## 2018-08-20 RX ADMIN — TRAMADOL HYDROCHLORIDE 25 MILLIGRAM(S): 50 TABLET ORAL at 08:50

## 2018-08-20 RX ADMIN — Medication 100 MILLIGRAM(S): at 06:49

## 2018-08-20 RX ADMIN — ONDANSETRON 4 MILLIGRAM(S): 8 TABLET, FILM COATED ORAL at 10:42

## 2018-08-20 RX ADMIN — TRAMADOL HYDROCHLORIDE 25 MILLIGRAM(S): 50 TABLET ORAL at 17:25

## 2018-08-20 RX ADMIN — Medication 100 MILLIGRAM(S): at 14:37

## 2018-08-20 NOTE — PROGRESS NOTE ADULT - ASSESSMENT
67 year old woman with PMH of COPD, asthma on chronic steroids, tracheobronchomalacia s/p bronchial thermoplasty (10/16), Afib on Eliquis, HTN, adrenal insufficiency, DM-2, DVT, SCC anus (diagnosed 2016, s/p chemo radiation s/p APR 7/24/18); presented from rehabilitation with fevers from presumed Gram Negative Bacterial PNA.

## 2018-08-20 NOTE — PROGRESS NOTE ADULT - PROBLEM SELECTOR PLAN 8
-C/w RAJWINDER QAC/HS.   -HbA1c 7.6  -C/w basal-bolus insulin regimen: Lantus 12units QHS and Humalog 4units with breakfast and with dinner for now, will increase to lispro 6 units with lunch, based on sliding scale.  -C/w Medrol 4mg PO daily for adrenal insufficiency.

## 2018-08-20 NOTE — PROGRESS NOTE ADULT - ATTENDING COMMENTS
as above--  asthma, TBM, chronic bronchitis---CT c/w new PNA vs UTI (? DIEUDONNE)  F/up all cx--re send sputum for C and S and AFB x3 days; continue cefepiime/vanco (covers pseudomonas)--ID luis (Afshan Stout et al)-diflucan for yeast in urine  Fbxjy-Kzqxfv-VA-on AC  PT needed    Eulalio Allison MD-Pulmonary   [phone] as above--  asthma, TBM, chronic bronchitis---CT c/w new PNA vs UTI (? DIEUDONNE)  F/up all cx--re send sputum for C and S and AFB x3 days; continue cefepime/vanco (covers pseudomonas)--ID luis (Afshan Stout et al)-diflucan for yeast in urine D6 of abx  Qfuvb-Ezjxpe-IU-on AC  PT needed    Eulalio Allison MD-Pulmonary   [phone]

## 2018-08-20 NOTE — PROGRESS NOTE ADULT - PROBLEM SELECTOR PLAN 2
abnormal sputum in past--on empiric ABX-last admit pseudomonas present  ID f/up evaln--re-cx sputum and afb x3 (?nathaniel)

## 2018-08-20 NOTE — PROGRESS NOTE ADULT - ASSESSMENT
67F asthma/COPD, tracheomalacia s/p tracheobronchoplasty 10/2016, chronic cough with sputum production, Atrial fibrillation on Eliquis, Adrenal insufficiency on chronic steroids, DM2, HTN, squamous cell CA of anus s/p chemo/XRT and abdominoperineal resection now presenting with fevers and found to have UTI and pneumonia - incidentally noted to have minimal ostomy output    8/16-ID-cefepime/vanco/diflucan (yeast in urine) 67F asthma/COPD, tracheomalacia s/p tracheobronchoplasty 10/2016, chronic cough with sputum production, Atrial fibrillation on Eliquis, Adrenal insufficiency on chronic steroids, DM2, HTN, squamous cell CA of anus s/p chemo/XRT and abdominoperineal resection now presenting with fevers and found to have UTI and pneumonia - incidentally noted to have minimal ostomy output    8/16-ID-cefepime/vanco/diflucan (yeast in urine)  6/20-improving-over all.

## 2018-08-20 NOTE — PROGRESS NOTE ADULT - SUBJECTIVE AND OBJECTIVE BOX
Infectious Diseases progress note:    Subjective: Feeling better.  Good ostomy output.  Cough better.  No fevers/dysuria    ROS:  CONSTITUTIONAL:  No fever, chills, rigors  CARDIOVASCULAR:  No chest pain or palpitations  RESPIRATORY:   No SOB, cough, dyspnea on exertion.  No wheezing  GASTROINTESTINAL:  No abd pain, N/V, diarrhea/constipation  EXTREMITIES:  No swelling or joint pain  GENITOURINARY:  No burning on urination, increased frequency or urgency.  No flank pain  NEUROLOGIC:  No HA, visual disturbances  SKIN: No rashes    Allergies    ampicillin (Short breath)  aspirin (Short breath)  Avelox (Short breath; Pruritus)  codeine (Short breath)  Dilaudid (Short breath)  iodine (Short breath; Swelling)  penicillin (Short breath)  shellfish (Anaphylaxis)  tetanus toxoid (Short breath)  Valium (Short breath)    Intolerances        ANTIBIOTICS/RELEVANT:  antimicrobials  cefepime   IVPB 2000 milliGRAM(s) IV Intermittent every 8 hours  vancomycin  IVPB 1250 milliGRAM(s) IV Intermittent every 12 hours    immunologic:    OTHER:  acetaminophen   Tablet. 1000 milliGRAM(s) Oral every 8 hours  ALBUTerol/ipratropium for Nebulization 3 milliLiter(s) Nebulizer every 6 hours  apixaban 5 milliGRAM(s) Oral every 12 hours  buDESOnide 160 MICROgram(s)/formoterol 4.5 MICROgram(s) Inhaler 2 Puff(s) Inhalation two times a day  chlorhexidine 4% Liquid 1 Application(s) Topical <User Schedule>  dextrose 40% Gel 15 Gram(s) Oral once PRN  dextrose 5%. 1000 milliLiter(s) IV Continuous <Continuous>  dextrose 50% Injectable 12.5 Gram(s) IV Push once  dextrose 50% Injectable 25 Gram(s) IV Push once  dextrose 50% Injectable 25 Gram(s) IV Push once  digoxin     Tablet 0.25 milliGRAM(s) Oral daily  diphenhydrAMINE   Capsule 25 milliGRAM(s) Oral every 8 hours PRN  docusate sodium 100 milliGRAM(s) Oral three times a day  glucagon  Injectable 1 milliGRAM(s) IntraMuscular once PRN  insulin glargine Injectable (LANTUS) 12 Unit(s) SubCutaneous at bedtime  insulin lispro (HumaLOG) corrective regimen sliding scale   SubCutaneous three times a day before meals  insulin lispro (HumaLOG) corrective regimen sliding scale   SubCutaneous at bedtime  insulin lispro Injectable (HumaLOG) 4 Unit(s) SubCutaneous two times a day with meals  insulin lispro Injectable (HumaLOG) 6 Unit(s) SubCutaneous with lunch  methylPREDNISolone 4 milliGRAM(s) Oral daily  montelukast 10 milliGRAM(s) Oral daily  morphine  - Injectable 1 milliGRAM(s) IV Push daily PRN  ondansetron Injectable 4 milliGRAM(s) IV Push every 6 hours PRN  pantoprazole    Tablet 40 milliGRAM(s) Oral before breakfast  polyethylene glycol 3350 17 Gram(s) Oral every 12 hours  senna 2 Tablet(s) Oral at bedtime  simethicone 160 milliGRAM(s) Chew four times a day PRN  traMADol 25 milliGRAM(s) Oral every 4 hours PRN      Objective:  Vital Signs Last 24 Hrs  T(C): 37.1 (20 Aug 2018 14:07), Max: 37.2 (19 Aug 2018 21:06)  T(F): 98.7 (20 Aug 2018 14:07), Max: 99 (19 Aug 2018 21:06)  HR: 93 (20 Aug 2018 14:07) (71 - 93)  BP: 115/70 (20 Aug 2018 14:07) (115/70 - 120/70)  BP(mean): --  RR: 18 (20 Aug 2018 14:07) (18 - 18)  SpO2: 95% (20 Aug 2018 14:07) (94% - 95%)    PHYSICAL EXAM:  Constitutional:NAD  Eyes:EBER, EOMI  Ear/Nose/Throat: no thrush, mucositis.  Moist mucous membranes	  Neck:no JVD, no lymphadenopathy, supple  Respiratory: CTA barry  Cardiovascular: S1S2 RRR, no murmurs  Gastrointestinal:soft, nontender,  nondistended (+) BS, ostomy with soft brown stools  Extremities:no e/e/c  Skin:  no rashes, open wounds or ulcerations        LABS:                        9.4    6.00  )-----------( 436      ( 20 Aug 2018 07:52 )             30.9     08-20    141  |  101  |  6<L>  ----------------------------<  114<H>  4.0   |  29  |  0.58    Ca    9.5      20 Aug 2018 06:50                  Vancomycin Level, Trough: 15.9 ug/mL (08-20 @ 10:14)  Vancomycin Level, Trough: 12.1 ug/mL (08-18 @ 20:21)  Vancomycin Level, Trough: 7.0 ug/mL (08-17 @ 22:06)      Rapid RVP Result: [facility]          MICROBIOLOGY:    Culture - Acid Fast - Sputum w/Smear . (08.18.18 @ 16:59)    Specimen Source: .Sputum Sputum    Acid Fast Bacilli Smear:   No acid fast bacilli seen by fluorochrome stain    Culture - Acid Fast - Sputum w/Smear . (08.17.18 @ 00:38)    Specimen Source: .Sputum Sputum    Acid Fast Bacilli Smear:   No acid fast bacilli seen by fluorochrome stain    Culture - Urine (08.16.18 @ 08:50)    -  Levofloxacin: R >4    -  Nitrofurantoin: S <=32 Should not be used to treat pyelonephritis.    -  Tetra/Doxy: R >8    -  Ampicillin: S <=2 Predicts results to ampicillin/sulbactam, amoxacillin-clavulanate and  piperacillin-tazobactam.    -  Ciprofloxacin: R >2    -  Vancomycin: S 2    Specimen Source: .Urine Clean Catch (Midstream)    Culture Results:   10,000 - 49,000 CFU/mL Enterococcus faecalis  <10,000 CFU/ml Normal Urogenital val present    Organism Identification: Enterococcus faecalis    Organism: Enterococcus faecalis    Method Type: GARRETT          RADIOLOGY & ADDITIONAL STUDIES:

## 2018-08-20 NOTE — PROGRESS NOTE ADULT - SUBJECTIVE AND OBJECTIVE BOX
CHIEF COMPLAINT: f/up for PNA, asthma, CB, TBM--    Interval Events:    REVIEW OF SYSTEMS:  Constitutional: No fevers or chills. No weight loss. No fatigue or generalized malaise.  Eyes: No itching or discharge from the eyes  ENT: No ear pain. No ear discharge. No nasal congestion. No post nasal drip. No epistaxis. No throat pain. No sore throat. No difficulty swallowing.   CV: No chest pain. No palpitations. No lightheadedness or dizziness.   Resp: No dyspnea at rest. No dyspnea on exertion. No orthopnea. No wheezing. No cough. No stridor. No sputum production. No chest pain with respiration.  GI: No nausea. No vomiting. No diarrhea.  MSK: No joint pain or pain in any extremities  Integumentary: No skin lesions. No pedal edema.  Neurological: No gross motor weakness. No sensory changes.  [ ] All other systems negative  [ ] Unable to assess ROS because ________    OBJECTIVE:  ICU Vital Signs Last 24 Hrs  T(C): 36.7 (20 Aug 2018 04:49), Max: 37.2 (19 Aug 2018 13:08)  T(F): 98.1 (20 Aug 2018 04:49), Max: 99 (19 Aug 2018 21:06)  HR: 71 (20 Aug 2018 04:49) (69 - 94)  BP: 120/70 (20 Aug 2018 04:49) (108/69 - 120/70)  BP(mean): --  ABP: --  ABP(mean): --  RR: 18 (20 Aug 2018 04:49) (18 - 18)  SpO2: 95% (20 Aug 2018 04:49) (92% - 95%)        08-18 @ 07:01  -  08-19 @ 07:00  --------------------------------------------------------  IN: 660 mL / OUT: 750 mL / NET: -90 mL    08-19 @ 07:01  -  08-20 @ 05:11  --------------------------------------------------------  IN: 180 mL / OUT: 2050 mL / NET: -1870 mL      CAPILLARY BLOOD GLUCOSE      POCT Blood Glucose.: 89 mg/dL (19 Aug 2018 21:43)      PHYSICAL EXAM:  General: Awake, alert, oriented X 3.   HEENT: Atraumatic, normocephalic.                 Mallampatti Grade                 No nasal congestion.                No tonsillar or pharyngeal exudates.  Lymph Nodes: No palpable lymphadenopathy  Neck: No JVD. No carotid bruit.   Respiratory: Normal chest expansion                         Normal percussion                         Normal and equal air entry                         No wheeze, rhonchi or rales.  Cardiovascular: S1 S2 normal. No murmurs, rubs or gallops.   Abdomen: Soft, non-tender, non-distended. No organomegaly.  Extremities: Warm to touch. Peripheral pulse palpable. No pedal edema.   Skin: No rashes or skin lesions  Neurological: Motor and sensory examination equal and normal in all four extremities.  Psychiatry: Appropriate mood and affect.    HOSPITAL MEDICATIONS:  MEDICATIONS  (STANDING):  acetaminophen   Tablet. 1000 milliGRAM(s) Oral every 8 hours  ALBUTerol/ipratropium for Nebulization 3 milliLiter(s) Nebulizer every 6 hours  apixaban 5 milliGRAM(s) Oral every 12 hours  buDESOnide 160 MICROgram(s)/formoterol 4.5 MICROgram(s) Inhaler 2 Puff(s) Inhalation two times a day  cefepime   IVPB 2000 milliGRAM(s) IV Intermittent every 8 hours  chlorhexidine 4% Liquid 1 Application(s) Topical <User Schedule>  dextrose 5%. 1000 milliLiter(s) (50 mL/Hr) IV Continuous <Continuous>  dextrose 50% Injectable 12.5 Gram(s) IV Push once  dextrose 50% Injectable 25 Gram(s) IV Push once  dextrose 50% Injectable 25 Gram(s) IV Push once  digoxin     Tablet 0.25 milliGRAM(s) Oral daily  docusate sodium 100 milliGRAM(s) Oral three times a day  insulin glargine Injectable (LANTUS) 12 Unit(s) SubCutaneous at bedtime  insulin lispro (HumaLOG) corrective regimen sliding scale   SubCutaneous three times a day before meals  insulin lispro (HumaLOG) corrective regimen sliding scale   SubCutaneous at bedtime  insulin lispro Injectable (HumaLOG) 4 Unit(s) SubCutaneous three times a day before meals  methylPREDNISolone 4 milliGRAM(s) Oral daily  montelukast 10 milliGRAM(s) Oral daily  pantoprazole    Tablet 40 milliGRAM(s) Oral before breakfast  polyethylene glycol 3350 17 Gram(s) Oral every 12 hours  senna 2 Tablet(s) Oral at bedtime  vancomycin  IVPB 1250 milliGRAM(s) IV Intermittent every 12 hours    MEDICATIONS  (PRN):  dextrose 40% Gel 15 Gram(s) Oral once PRN Blood Glucose LESS THAN 70 milliGRAM(s)/deciliter  diphenhydrAMINE   Capsule 25 milliGRAM(s) Oral every 8 hours PRN Rash and/or Itching  glucagon  Injectable 1 milliGRAM(s) IntraMuscular once PRN Glucose LESS THAN 70 milligrams/deciliter  morphine  - Injectable 1 milliGRAM(s) IV Push daily PRN wound vac/ dressing changes  ondansetron Injectable 4 milliGRAM(s) IV Push every 6 hours PRN Nausea and/or Vomiting  simethicone 160 milliGRAM(s) Chew four times a day PRN Gas  traMADol 25 milliGRAM(s) Oral every 4 hours PRN Severe Pain (7 - 10)      LABS:                        9.3    5.36  )-----------( 391      ( 19 Aug 2018 09:21 )             30.4     08-19    141  |  100  |  6<L>  ----------------------------<  104<H>  3.8   |  29  |  0.54    Ca    9.0      19 Aug 2018 07:15  Mg     2.0     08-18                MICROBIOLOGY:     RADIOLOGY:  [ ] Reviewed and interpreted by me    Point of Care Ultrasound Findings:    PFT:    EKG: CHIEF COMPLAINT: f/up for PNA, asthma, CB, TBM--much better stomach--BM in progress, no sob-minimal cough    Interval Events: minimal ambulating    REVIEW OF SYSTEMS:  Constitutional: No fevers or chills. No weight loss. No fatigue or generalized malaise.  Eyes: No itching or discharge from the eyes  ENT: No ear pain. No ear discharge. No nasal congestion. No post nasal drip. No epistaxis. No throat pain. No sore throat. No difficulty swallowing.   CV: No chest pain. No palpitations. No lightheadedness or dizziness.   Resp: No dyspnea at rest. No dyspnea on exertion. No orthopnea. No wheezing. No cough. No stridor. No sputum production. No chest pain with respiration.  GI: No nausea. No vomiting. No diarrhea.  MSK: No joint pain or pain in any extremities  Integumentary: No skin lesions. No pedal edema.  Neurological: No gross motor weakness. No sensory changes.  [+ ] All other systems negative  [ ] Unable to assess ROS because ________    OBJECTIVE:  ICU Vital Signs Last 24 Hrs  T(C): 36.7 (20 Aug 2018 04:49), Max: 37.2 (19 Aug 2018 13:08)  T(F): 98.1 (20 Aug 2018 04:49), Max: 99 (19 Aug 2018 21:06)  HR: 71 (20 Aug 2018 04:49) (69 - 94)  BP: 120/70 (20 Aug 2018 04:49) (108/69 - 120/70)  BP(mean): --  ABP: --  ABP(mean): --  RR: 18 (20 Aug 2018 04:49) (18 - 18)  SpO2: 95% (20 Aug 2018 04:49) (92% - 95%)        08-18 @ 07:01  -  08-19 @ 07:00  --------------------------------------------------------  IN: 660 mL / OUT: 750 mL / NET: -90 mL    08-19 @ 07:01  -  08-20 @ 05:11  --------------------------------------------------------  IN: 180 mL / OUT: 2050 mL / NET: -1870 mL      CAPILLARY BLOOD GLUCOSE      POCT Blood Glucose.: 89 mg/dL (19 Aug 2018 21:43)      PHYSICAL EXAM: NAD in bed  General: Awake, alert, oriented X 3.   HEENT: Atraumatic, normocephalic.                 Mallampatti Grade 2                No nasal congestion.                No tonsillar or pharyngeal exudates.  Lymph Nodes: No palpable lymphadenopathy  Neck: No JVD. No carotid bruit.   Respiratory: Normal chest expansion                         Normal percussion                         Normal and equal air entry                         No wheeze, rhonchi or rales.  Cardiovascular: S1 S2 normal. No murmurs, rubs or gallops.   Abdomen: Soft, non-tender, non-distended. No organomegaly. NABS--ostomy  Extremities: Warm to touch. Peripheral pulse palpable. No pedal edema.   Skin: No rashes or skin lesions  Neurological: Motor and sensory examination equal and normal in all four extremities.  Psychiatry: Appropriate mood and affect.    HOSPITAL MEDICATIONS:  MEDICATIONS  (STANDING):  acetaminophen   Tablet. 1000 milliGRAM(s) Oral every 8 hours  ALBUTerol/ipratropium for Nebulization 3 milliLiter(s) Nebulizer every 6 hours  apixaban 5 milliGRAM(s) Oral every 12 hours  buDESOnide 160 MICROgram(s)/formoterol 4.5 MICROgram(s) Inhaler 2 Puff(s) Inhalation two times a day  cefepime   IVPB 2000 milliGRAM(s) IV Intermittent every 8 hours  chlorhexidine 4% Liquid 1 Application(s) Topical <User Schedule>  dextrose 5%. 1000 milliLiter(s) (50 mL/Hr) IV Continuous <Continuous>  dextrose 50% Injectable 12.5 Gram(s) IV Push once  dextrose 50% Injectable 25 Gram(s) IV Push once  dextrose 50% Injectable 25 Gram(s) IV Push once  digoxin     Tablet 0.25 milliGRAM(s) Oral daily  docusate sodium 100 milliGRAM(s) Oral three times a day  insulin glargine Injectable (LANTUS) 12 Unit(s) SubCutaneous at bedtime  insulin lispro (HumaLOG) corrective regimen sliding scale   SubCutaneous three times a day before meals  insulin lispro (HumaLOG) corrective regimen sliding scale   SubCutaneous at bedtime  insulin lispro Injectable (HumaLOG) 4 Unit(s) SubCutaneous three times a day before meals  methylPREDNISolone 4 milliGRAM(s) Oral daily  montelukast 10 milliGRAM(s) Oral daily  pantoprazole    Tablet 40 milliGRAM(s) Oral before breakfast  polyethylene glycol 3350 17 Gram(s) Oral every 12 hours  senna 2 Tablet(s) Oral at bedtime  vancomycin  IVPB 1250 milliGRAM(s) IV Intermittent every 12 hours    MEDICATIONS  (PRN):  dextrose 40% Gel 15 Gram(s) Oral once PRN Blood Glucose LESS THAN 70 milliGRAM(s)/deciliter  diphenhydrAMINE   Capsule 25 milliGRAM(s) Oral every 8 hours PRN Rash and/or Itching  glucagon  Injectable 1 milliGRAM(s) IntraMuscular once PRN Glucose LESS THAN 70 milligrams/deciliter  morphine  - Injectable 1 milliGRAM(s) IV Push daily PRN wound vac/ dressing changes  ondansetron Injectable 4 milliGRAM(s) IV Push every 6 hours PRN Nausea and/or Vomiting  simethicone 160 milliGRAM(s) Chew four times a day PRN Gas  traMADol 25 milliGRAM(s) Oral every 4 hours PRN Severe Pain (7 - 10)      LABS:                        9.3    5.36  )-----------( 391      ( 19 Aug 2018 09:21 )             30.4     08-19    141  |  100  |  6<L>  ----------------------------<  104<H>  3.8   |  29  |  0.54    Ca    9.0      19 Aug 2018 07:15  Mg     2.0     08-18                MICROBIOLOGY:     RADIOLOGY:  [ ] Reviewed and interpreted by me    Point of Care Ultrasound Findings:    PFT:    EKG:

## 2018-08-20 NOTE — PROGRESS NOTE ADULT - PROBLEM SELECTOR PLAN 2
-There was yeast in UA.  -Urine culture sent, not showing candida, fluconazole DC'ed.   -UCx positive for E. faecalis, sensitive to ampicillin and vanco. C/w current abx.   -ID recs appreciated.

## 2018-08-20 NOTE — PROGRESS NOTE ADULT - ASSESSMENT
The patient is a 66 yo woman with PMH of COPD, ashtma on chronic steroids, tracheobronchomalacia s/p bronchial thermoplasty (10/16), Afib on Eliquis, HTN, adrenal insufficiency, T2DM, DVT, SCC anus (dx 2016, s/p chemo rad s/p APR 7/24/18) presenting from rehabiliation with fevers. The patient stated she felt subjective fevers yesterday to T max 100.2 this morning, she was informed given she is on steroids T > 99 would be fever, prompting her to come to the ED. She is denying any chills URI symptoms of rhinorrhea, sore throat, dysphagia. She has cough that is sometimes productive with greenish phlegm however she has had it for several years. She feels increased SOB non-exertional from decreased inspiratory effort. During patient's previous admission end of July 2018 she developed fevers with positive urine culture. She was treated with 7 day course of ceftriaxone. She is reporting continued dysuria, increased frequency, incontinence. The patient ostomy has not emptied since she was in rehabilitation, she is citing functioing during previous admission prior to rehabilitation. She is denying any sick contacts.    Pt states she has an open draining wound in the perineum, for which she had a wound vac that is now off.  Drainage is malodorous.     Problem/Plan - 1:    ·	HCAP/aspiration pna    - CT chest with multifocal R sided pna.   Agree with broad spectrum abx coverage with vanco/cefepime given recent hospitalization.  Recent sputum cx grew pseudomonas.  Complete 7 day course through 8/21    - Check repeat sputum cx.  Legionella was negative    - f/u blood cultures    - r/o DIEUDONNE.  f/u sputum AFB x 3.  AFB negative x 2 thus far    Problem/Plan - 2:    ·	UTI    - (+) UA with urinary symptoms.  Budding yeast seen on UA, urine cultures negative for candida, diflucan d/c'd.  Urine cultures with E.faecalis, sensitive to vanco/ampicillin.  cont present abx.  clinically improving        Problem/Plan - 3:    ·	Open perineal wound    - Pt with recent anal SCC and resection.  s/p wound care evaluation for wound vac/topical care recommendations    - Colorectal surgery f/u    Will follow    Joselyn Esteban  [phone]

## 2018-08-20 NOTE — PROGRESS NOTE ADULT - PROBLEM SELECTOR PLAN 5
-S/p mitomycin/xeloda and RT 5/17 to 6/17. PET 1/18 showed hypermetabolism in anal area with rad onc concerned for local relapse/failure proved by biopsy 2/18  -Underwent APR with Dr. Terrell Luong 7/24/18 with permanent end colostomy  -Purulence around wound, however it is draining what appears to been mucus  -CRS recs appreciated, PT wound care placed wound vac.   -C/w miralax, senna, and colace for constipation.   -Outpatient f/u with Dr. King.  -Ostomy care.   -Pain control PRN.   -C/w Full liquid diabetic diet for now, advance as tolerated.

## 2018-08-20 NOTE — CHART NOTE - NSCHARTNOTEFT_GEN_A_CORE
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 Ostomy output 700cc yesterday and 100cc today thus far.      PO intake:  < 50% [ ] 50-75% [x ]   % [ ]  other :     Source for PO intake [x ] Patient [ ] family [x ] chart [ ] staff [ ] other: Pt reports fair appetite and ~50-75% po intakes of full liquid diet, drinking Glucerna 2 x day. Pt states she does not want diet advancement 2/2 nausea which 'comes and goes'      Current Weight: Weight (kg): 63.2 (08-14 @ 20:02)  % Weight Change    Pertinent Medications: MEDICATIONS  (STANDING):  acetaminophen   Tablet. 1000 milliGRAM(s) Oral every 8 hours  ALBUTerol/ipratropium for Nebulization 3 milliLiter(s) Nebulizer every 6 hours  apixaban 5 milliGRAM(s) Oral every 12 hours  buDESOnide 160 MICROgram(s)/formoterol 4.5 MICROgram(s) Inhaler 2 Puff(s) Inhalation two times a day  cefepime   IVPB 2000 milliGRAM(s) IV Intermittent every 8 hours  chlorhexidine 4% Liquid 1 Application(s) Topical <User Schedule>  dextrose 5%. 1000 milliLiter(s) (50 mL/Hr) IV Continuous <Continuous>  dextrose 50% Injectable 12.5 Gram(s) IV Push once  dextrose 50% Injectable 25 Gram(s) IV Push once  dextrose 50% Injectable 25 Gram(s) IV Push once  digoxin     Tablet 0.25 milliGRAM(s) Oral daily  docusate sodium 100 milliGRAM(s) Oral three times a day  insulin glargine Injectable (LANTUS) 12 Unit(s) SubCutaneous at bedtime  insulin lispro (HumaLOG) corrective regimen sliding scale   SubCutaneous three times a day before meals  insulin lispro (HumaLOG) corrective regimen sliding scale   SubCutaneous at bedtime  insulin lispro Injectable (HumaLOG) 4 Unit(s) SubCutaneous three times a day before meals  methylPREDNISolone 4 milliGRAM(s) Oral daily  montelukast 10 milliGRAM(s) Oral daily  pantoprazole    Tablet 40 milliGRAM(s) Oral before breakfast  polyethylene glycol 3350 17 Gram(s) Oral every 12 hours  senna 2 Tablet(s) Oral at bedtime  vancomycin  IVPB 1250 milliGRAM(s) IV Intermittent every 12 hours    MEDICATIONS  (PRN):  dextrose 40% Gel 15 Gram(s) Oral once PRN Blood Glucose LESS THAN 70 milliGRAM(s)/deciliter  diphenhydrAMINE   Capsule 25 milliGRAM(s) Oral every 8 hours PRN Rash and/or Itching  glucagon  Injectable 1 milliGRAM(s) IntraMuscular once PRN Glucose LESS THAN 70 milligrams/deciliter  morphine  - Injectable 1 milliGRAM(s) IV Push daily PRN wound vac/ dressing changes  ondansetron Injectable 4 milliGRAM(s) IV Push every 6 hours PRN Nausea and/or Vomiting  simethicone 160 milliGRAM(s) Chew four times a day PRN Gas  traMADol 25 milliGRAM(s) Oral every 4 hours PRN Severe Pain (7 - 10)    Pertinent Labs:  08-20 Na141 mmol/L Glu 114 mg/dL<H> K+ 4.0 mmol/L Cr  0.58 mg/dL BUN 6 mg/dL<L> 08-15 Alb 3.0 g/dL<L> 08-18 [id number] 7.6 %<H>; Fingersticks (8/18-20)       Skin: no edema, skin intact    Estimated Needs:   [x ] no change since previous assessment  [ ] recalculated:       Previous Nutrition Diagnosis:     [ ] Inadequate Energy Intake [ ]Inadequate Oral Intake [ ] Excessive Energy Intake     [ ] Underweight [ ] Increased Nutrient Needs [ ] Overweight/Obesity     [ ] Altered GI Function [ ] Unintended Weight Loss [ ] Food & Nutrition Related Knowledge Deficit [ x] Malnutrition - severe         Nutrition Diagnosis is [x ] ongoing  [ ] resolved [ ] not applicable          New Nutrition Diagnosis: [ x] not applicable      Recommend    [ x] Change Diet To: Pt wishes to remain on full liquid diet until nausea resolves. Recommend diet advancement to Consistent Carbohydrate Low Fiber as tolerated when medically feasible.     [ x] Nutrition Supplement: Glucerna 2 x day    [ ] Nutrition Support    [ ] Other:        Monitoring and Evaluation:     [ x] PO intake [ x] Tolerance to diet prescription [ x] weights [ ] follow up per protocol    [ ] other:

## 2018-08-20 NOTE — PROGRESS NOTE ADULT - PROBLEM SELECTOR PLAN 1
?PNA (may represent DIEUDONNE) vs. UTI--check all cx  D3 cefepime/vanco (p/t)  Diflucan for yeast urine ?PNA (may represent DIEUDONNE) vs. UTI--check all cx  D6 cefepime/vanco (p/t)  Diflucan for yeast urine

## 2018-08-20 NOTE — PROGRESS NOTE ADULT - SUBJECTIVE AND OBJECTIVE BOX
Patient is a 67y old  Female who presents with a chief complaint of UTI (14 Aug 2018 21:17)        SUBJECTIVE / OVERNIGHT EVENTS: Patient reports some nausea after taking the miralax. She says she feels a little queasy. She would like her ostomy changed. She does not report CP.       MEDICATIONS  (STANDING):  acetaminophen   Tablet. 1000 milliGRAM(s) Oral every 8 hours  ALBUTerol/ipratropium for Nebulization 3 milliLiter(s) Nebulizer every 6 hours  apixaban 5 milliGRAM(s) Oral every 12 hours  buDESOnide 160 MICROgram(s)/formoterol 4.5 MICROgram(s) Inhaler 2 Puff(s) Inhalation two times a day  cefepime   IVPB 2000 milliGRAM(s) IV Intermittent every 8 hours  chlorhexidine 4% Liquid 1 Application(s) Topical <User Schedule>  dextrose 5%. 1000 milliLiter(s) (50 mL/Hr) IV Continuous <Continuous>  dextrose 50% Injectable 12.5 Gram(s) IV Push once  dextrose 50% Injectable 25 Gram(s) IV Push once  dextrose 50% Injectable 25 Gram(s) IV Push once  digoxin     Tablet 0.25 milliGRAM(s) Oral daily  docusate sodium 100 milliGRAM(s) Oral three times a day  insulin glargine Injectable (LANTUS) 12 Unit(s) SubCutaneous at bedtime  insulin lispro (HumaLOG) corrective regimen sliding scale   SubCutaneous three times a day before meals  insulin lispro (HumaLOG) corrective regimen sliding scale   SubCutaneous at bedtime  insulin lispro Injectable (HumaLOG) 4 Unit(s) SubCutaneous three times a day before meals  methylPREDNISolone 4 milliGRAM(s) Oral daily  montelukast 10 milliGRAM(s) Oral daily  pantoprazole    Tablet 40 milliGRAM(s) Oral before breakfast  polyethylene glycol 3350 17 Gram(s) Oral every 12 hours  senna 2 Tablet(s) Oral at bedtime  vancomycin  IVPB 1250 milliGRAM(s) IV Intermittent every 12 hours    MEDICATIONS  (PRN):  dextrose 40% Gel 15 Gram(s) Oral once PRN Blood Glucose LESS THAN 70 milliGRAM(s)/deciliter  diphenhydrAMINE   Capsule 25 milliGRAM(s) Oral every 8 hours PRN Rash and/or Itching  glucagon  Injectable 1 milliGRAM(s) IntraMuscular once PRN Glucose LESS THAN 70 milligrams/deciliter  morphine  - Injectable 1 milliGRAM(s) IV Push daily PRN wound vac/ dressing changes  ondansetron Injectable 4 milliGRAM(s) IV Push every 6 hours PRN Nausea and/or Vomiting  simethicone 160 milliGRAM(s) Chew four times a day PRN Gas  traMADol 25 milliGRAM(s) Oral every 4 hours PRN Severe Pain (7 - 10)      Vital Signs Last 24 Hrs  T(C): 36.7 (20 Aug 2018 04:49), Max: 37.2 (19 Aug 2018 21:06)  T(F): 98.1 (20 Aug 2018 04:49), Max: 99 (19 Aug 2018 21:06)  HR: 71 (20 Aug 2018 04:49) (71 - 83)  BP: 120/70 (20 Aug 2018 04:49) (117/71 - 120/70)  BP(mean): --  RR: 18 (20 Aug 2018 04:49) (18 - 18)  SpO2: 95% (20 Aug 2018 04:49) (94% - 95%)  CAPILLARY BLOOD GLUCOSE      POCT Blood Glucose.: 266 mg/dL (20 Aug 2018 11:59)  POCT Blood Glucose.: 135 mg/dL (20 Aug 2018 08:02)  POCT Blood Glucose.: 89 mg/dL (19 Aug 2018 21:43)  POCT Blood Glucose.: 126 mg/dL (19 Aug 2018 17:01)    I&O's Summary    19 Aug 2018 07:01  -  20 Aug 2018 07:00  --------------------------------------------------------  IN: 600 mL / OUT: 2350 mL / NET: -1750 mL          PHYSICAL EXAM:  GENERAL: NAD, well-developed  HEAD: Atraumatic, Normocephalic  EYES: Conjunctiva and sclera mildly pale   NECK: Supple  CHEST/LUNG: Mild crackles in RLB.   HEART: Regular rate and rhythm; No murmurs, rubs, or gallops  ABDOMEN: Soft, Ostomy present.   EXTREMITIES: No clubbing, cyanosis, or edema  PSYCH/Neuro: AAOx3. Non-focal.   SKIN: No rashes or lesions      LABS:                        9.4    6.00  )-----------( 436      ( 20 Aug 2018 07:52 )             30.9     08-20    141  |  101  |  6<L>  ----------------------------<  114<H>  4.0   |  29  |  0.58    Ca    9.5      20 Aug 2018 06:50          RADIOLOGY & ADDITIONAL TESTS:    Imaging Personally Reviewed:   Consultant(s) Notes Reviewed: Pulmonary and CRS notes.   Care Discussed with Consultants/Other Providers: Patient is a 67y old  Female who presents with a chief complaint of UTI (14 Aug 2018 21:17)        SUBJECTIVE / OVERNIGHT EVENTS: Patient reports some nausea after taking the miralax. She says she feels a little queasy. She would like her ostomy changed. She does not report CP.       MEDICATIONS  (STANDING):  acetaminophen   Tablet. 1000 milliGRAM(s) Oral every 8 hours  ALBUTerol/ipratropium for Nebulization 3 milliLiter(s) Nebulizer every 6 hours  apixaban 5 milliGRAM(s) Oral every 12 hours  buDESOnide 160 MICROgram(s)/formoterol 4.5 MICROgram(s) Inhaler 2 Puff(s) Inhalation two times a day  cefepime   IVPB 2000 milliGRAM(s) IV Intermittent every 8 hours  chlorhexidine 4% Liquid 1 Application(s) Topical <User Schedule>  dextrose 5%. 1000 milliLiter(s) (50 mL/Hr) IV Continuous <Continuous>  dextrose 50% Injectable 12.5 Gram(s) IV Push once  dextrose 50% Injectable 25 Gram(s) IV Push once  dextrose 50% Injectable 25 Gram(s) IV Push once  digoxin     Tablet 0.25 milliGRAM(s) Oral daily  docusate sodium 100 milliGRAM(s) Oral three times a day  insulin glargine Injectable (LANTUS) 12 Unit(s) SubCutaneous at bedtime  insulin lispro (HumaLOG) corrective regimen sliding scale   SubCutaneous three times a day before meals  insulin lispro (HumaLOG) corrective regimen sliding scale   SubCutaneous at bedtime  insulin lispro Injectable (HumaLOG) 4 Unit(s) SubCutaneous three times a day before meals  methylPREDNISolone 4 milliGRAM(s) Oral daily  montelukast 10 milliGRAM(s) Oral daily  pantoprazole    Tablet 40 milliGRAM(s) Oral before breakfast  polyethylene glycol 3350 17 Gram(s) Oral every 12 hours  senna 2 Tablet(s) Oral at bedtime  vancomycin  IVPB 1250 milliGRAM(s) IV Intermittent every 12 hours    MEDICATIONS  (PRN):  dextrose 40% Gel 15 Gram(s) Oral once PRN Blood Glucose LESS THAN 70 milliGRAM(s)/deciliter  diphenhydrAMINE   Capsule 25 milliGRAM(s) Oral every 8 hours PRN Rash and/or Itching  glucagon  Injectable 1 milliGRAM(s) IntraMuscular once PRN Glucose LESS THAN 70 milligrams/deciliter  morphine  - Injectable 1 milliGRAM(s) IV Push daily PRN wound vac/ dressing changes  ondansetron Injectable 4 milliGRAM(s) IV Push every 6 hours PRN Nausea and/or Vomiting  simethicone 160 milliGRAM(s) Chew four times a day PRN Gas  traMADol 25 milliGRAM(s) Oral every 4 hours PRN Severe Pain (7 - 10)      Vital Signs Last 24 Hrs  T(C): 36.7 (20 Aug 2018 04:49), Max: 37.2 (19 Aug 2018 21:06)  T(F): 98.1 (20 Aug 2018 04:49), Max: 99 (19 Aug 2018 21:06)  HR: 71 (20 Aug 2018 04:49) (71 - 83)  BP: 120/70 (20 Aug 2018 04:49) (117/71 - 120/70)  BP(mean): --  RR: 18 (20 Aug 2018 04:49) (18 - 18)  SpO2: 95% (20 Aug 2018 04:49) (94% - 95%)  CAPILLARY BLOOD GLUCOSE      POCT Blood Glucose.: 266 mg/dL (20 Aug 2018 11:59)  POCT Blood Glucose.: 135 mg/dL (20 Aug 2018 08:02)  POCT Blood Glucose.: 89 mg/dL (19 Aug 2018 21:43)  POCT Blood Glucose.: 126 mg/dL (19 Aug 2018 17:01)    I&O's Summary    19 Aug 2018 07:01  -  20 Aug 2018 07:00  --------------------------------------------------------  IN: 600 mL / OUT: 2350 mL / NET: -1750 mL          PHYSICAL EXAM:  GENERAL: NAD, well-developed  HEAD: Atraumatic, Normocephalic  EYES: Conjunctiva and sclera mildly pale   NECK: Supple  CHEST/LUNG: Mild crackles in RLB.   HEART: Irregular rate and rhythm; No murmurs, rubs, or gallops  ABDOMEN: Soft, Ostomy present.   EXTREMITIES: No clubbing, cyanosis, or edema  PSYCH/Neuro: AAOx3. Non-focal.   SKIN: No rashes or lesions      LABS:                        9.4    6.00  )-----------( 436      ( 20 Aug 2018 07:52 )             30.9     08-20    141  |  101  |  6<L>  ----------------------------<  114<H>  4.0   |  29  |  0.58    Ca    9.5      20 Aug 2018 06:50          RADIOLOGY & ADDITIONAL TESTS:    Imaging Personally Reviewed:   Consultant(s) Notes Reviewed: Pulmonary and CRS notes.   Care Discussed with Consultants/Other Providers:

## 2018-08-20 NOTE — PROGRESS NOTE ADULT - PROBLEM SELECTOR PLAN 10
-Diet: Full liquid with diabetic diet for now.   -VTE PPx: On Eliquis 5mg BID.  -PT eval recmarilyn WALLACE. Wound vac in place.

## 2018-08-20 NOTE — PROGRESS NOTE ADULT - PROBLEM SELECTOR PLAN 1
-CT scan showed worsening opacity in RLL, CT chest showed possibility of DIEUDONNE infection, with tree in bud opacities  -C/w cefepime 2gm IV Q8H (8/14- and vancomycin 1250mg Q12H (8/15-, f/u next vanco trough).   -Urine legionella was negative so DC'ed azithromycin.   -C/w chest PT, Acapella, duonebs, aspiration precautions, incentive spirometer.   -Appreciate pulmonary and ID recs  -AFB x 3 needed (only 2/3 sent)  -Sputum cultures ordered.   -Blood cultures; NGTD.  -C/w Symbicort.

## 2018-08-20 NOTE — PROGRESS NOTE ADULT - PROBLEM SELECTOR PLAN 9
-Patient presented with generalized abdominal pain now improved with pain regimen.   -Avoid excessive opiates in setting of constipation.   -Bowel regimen while on opiates.   -C/w simethicone and pantoprazole.

## 2018-08-20 NOTE — PROGRESS NOTE ADULT - PROBLEM SELECTOR PLAN 6
-C/w senna, colace, and miralax.   -AXR from 8/16 showed ileus  -S/p Enema through ostomy done by CRS with successful increase in stool output.  -C/w Full liquid diabetic diet for now, advance as tolerated.  -Stool counts.

## 2018-08-21 ENCOUNTER — TRANSCRIPTION ENCOUNTER (OUTPATIENT)
Age: 68
End: 2018-08-21

## 2018-08-21 LAB
ANION GAP SERPL CALC-SCNC: 12 MMOL/L — SIGNIFICANT CHANGE UP (ref 5–17)
ANISOCYTOSIS BLD QL: SLIGHT — SIGNIFICANT CHANGE UP
BASOPHILS # BLD AUTO: 0.03 K/UL — SIGNIFICANT CHANGE UP (ref 0–0.2)
BASOPHILS NFR BLD AUTO: 0.4 % — SIGNIFICANT CHANGE UP (ref 0–2)
BUN SERPL-MCNC: 6 MG/DL — LOW (ref 7–23)
CALCIUM SERPL-MCNC: 9.2 MG/DL — SIGNIFICANT CHANGE UP (ref 8.4–10.5)
CHLORIDE SERPL-SCNC: 99 MMOL/L — SIGNIFICANT CHANGE UP (ref 96–108)
CO2 SERPL-SCNC: 28 MMOL/L — SIGNIFICANT CHANGE UP (ref 22–31)
CREAT SERPL-MCNC: 0.59 MG/DL — SIGNIFICANT CHANGE UP (ref 0.5–1.3)
EOSINOPHIL # BLD AUTO: 0.67 K/UL — HIGH (ref 0–0.5)
EOSINOPHIL NFR BLD AUTO: 10 % — HIGH (ref 0–6)
GLUCOSE BLDC GLUCOMTR-MCNC: 117 MG/DL — HIGH (ref 70–99)
GLUCOSE BLDC GLUCOMTR-MCNC: 149 MG/DL — HIGH (ref 70–99)
GLUCOSE BLDC GLUCOMTR-MCNC: 189 MG/DL — HIGH (ref 70–99)
GLUCOSE BLDC GLUCOMTR-MCNC: 360 MG/DL — HIGH (ref 70–99)
GLUCOSE SERPL-MCNC: 122 MG/DL — HIGH (ref 70–99)
GRAM STN FLD: SIGNIFICANT CHANGE UP
HCT VFR BLD CALC: 32.5 % — LOW (ref 34.5–45)
HGB BLD-MCNC: 10 G/DL — LOW (ref 11.5–15.5)
IMM GRANULOCYTES NFR BLD AUTO: 1 % — SIGNIFICANT CHANGE UP (ref 0–1.5)
LYMPHOCYTES # BLD AUTO: 1 K/UL — SIGNIFICANT CHANGE UP (ref 1–3.3)
LYMPHOCYTES # BLD AUTO: 14.9 % — SIGNIFICANT CHANGE UP (ref 13–44)
MANUAL SMEAR VERIFICATION: SIGNIFICANT CHANGE UP
MCHC RBC-ENTMCNC: 22.2 PG — LOW (ref 27–34)
MCHC RBC-ENTMCNC: 30.8 GM/DL — LOW (ref 32–36)
MCV RBC AUTO: 72.2 FL — LOW (ref 80–100)
MICROCYTES BLD QL: SLIGHT — SIGNIFICANT CHANGE UP
MONOCYTES # BLD AUTO: 0.93 K/UL — HIGH (ref 0–0.9)
MONOCYTES NFR BLD AUTO: 13.8 % — SIGNIFICANT CHANGE UP (ref 2–14)
NEUTROPHILS # BLD AUTO: 4.02 K/UL — SIGNIFICANT CHANGE UP (ref 1.8–7.4)
NEUTROPHILS NFR BLD AUTO: 59.9 % — SIGNIFICANT CHANGE UP (ref 43–77)
PLAT MORPH BLD: NORMAL — SIGNIFICANT CHANGE UP
PLATELET # BLD AUTO: 395 K/UL — SIGNIFICANT CHANGE UP (ref 150–400)
POTASSIUM SERPL-MCNC: 4.2 MMOL/L — SIGNIFICANT CHANGE UP (ref 3.5–5.3)
POTASSIUM SERPL-SCNC: 4.2 MMOL/L — SIGNIFICANT CHANGE UP (ref 3.5–5.3)
RBC # BLD: 4.5 M/UL — SIGNIFICANT CHANGE UP (ref 3.8–5.2)
RBC # FLD: 16.4 % — HIGH (ref 10.3–14.5)
RBC BLD AUTO: ABNORMAL
SODIUM SERPL-SCNC: 139 MMOL/L — SIGNIFICANT CHANGE UP (ref 135–145)
SPECIMEN SOURCE: SIGNIFICANT CHANGE UP
WBC # BLD: 6.72 K/UL — SIGNIFICANT CHANGE UP (ref 3.8–10.5)
WBC # FLD AUTO: 6.72 K/UL — SIGNIFICANT CHANGE UP (ref 3.8–10.5)

## 2018-08-21 PROCEDURE — 99233 SBSQ HOSP IP/OBS HIGH 50: CPT

## 2018-08-21 PROCEDURE — 99232 SBSQ HOSP IP/OBS MODERATE 35: CPT

## 2018-08-21 RX ORDER — INSULIN LISPRO 100/ML
4 VIAL (ML) SUBCUTANEOUS
Qty: 0 | Refills: 0 | Status: DISCONTINUED | OUTPATIENT
Start: 2018-08-21 | End: 2018-08-23

## 2018-08-21 RX ORDER — METOCLOPRAMIDE HCL 10 MG
5 TABLET ORAL THREE TIMES A DAY
Qty: 0 | Refills: 0 | Status: DISCONTINUED | OUTPATIENT
Start: 2018-08-21 | End: 2018-08-23

## 2018-08-21 RX ORDER — INSULIN LISPRO 100/ML
6 VIAL (ML) SUBCUTANEOUS
Qty: 0 | Refills: 0 | Status: DISCONTINUED | OUTPATIENT
Start: 2018-08-21 | End: 2018-08-23

## 2018-08-21 RX ADMIN — Medication 6 UNIT(S): at 12:25

## 2018-08-21 RX ADMIN — Medication 600 MILLIGRAM(S): at 13:29

## 2018-08-21 RX ADMIN — Medication 5: at 12:24

## 2018-08-21 RX ADMIN — Medication 4 UNIT(S): at 17:25

## 2018-08-21 RX ADMIN — Medication 3 MILLILITER(S): at 13:28

## 2018-08-21 RX ADMIN — Medication 25 MILLIGRAM(S): at 09:14

## 2018-08-21 RX ADMIN — Medication 3 MILLILITER(S): at 06:30

## 2018-08-21 RX ADMIN — CEFEPIME 100 MILLIGRAM(S): 1 INJECTION, POWDER, FOR SOLUTION INTRAMUSCULAR; INTRAVENOUS at 06:32

## 2018-08-21 RX ADMIN — BUDESONIDE AND FORMOTEROL FUMARATE DIHYDRATE 2 PUFF(S): 160; 4.5 AEROSOL RESPIRATORY (INHALATION) at 18:34

## 2018-08-21 RX ADMIN — PANTOPRAZOLE SODIUM 40 MILLIGRAM(S): 20 TABLET, DELAYED RELEASE ORAL at 06:30

## 2018-08-21 RX ADMIN — Medication 4 MILLIGRAM(S): at 06:30

## 2018-08-21 RX ADMIN — CEFEPIME 100 MILLIGRAM(S): 1 INJECTION, POWDER, FOR SOLUTION INTRAMUSCULAR; INTRAVENOUS at 21:30

## 2018-08-21 RX ADMIN — Medication 166.67 MILLIGRAM(S): at 21:31

## 2018-08-21 RX ADMIN — Medication 166.67 MILLIGRAM(S): at 10:40

## 2018-08-21 RX ADMIN — Medication 3 MILLILITER(S): at 18:34

## 2018-08-21 RX ADMIN — POLYETHYLENE GLYCOL 3350 17 GRAM(S): 17 POWDER, FOR SOLUTION ORAL at 10:41

## 2018-08-21 RX ADMIN — APIXABAN 5 MILLIGRAM(S): 2.5 TABLET, FILM COATED ORAL at 21:31

## 2018-08-21 RX ADMIN — TRAMADOL HYDROCHLORIDE 25 MILLIGRAM(S): 50 TABLET ORAL at 11:47

## 2018-08-21 RX ADMIN — Medication 4 UNIT(S): at 08:05

## 2018-08-21 RX ADMIN — Medication 600 MILLIGRAM(S): at 21:30

## 2018-08-21 RX ADMIN — CEFEPIME 100 MILLIGRAM(S): 1 INJECTION, POWDER, FOR SOLUTION INTRAMUSCULAR; INTRAVENOUS at 13:34

## 2018-08-21 RX ADMIN — Medication 5 MILLIGRAM(S): at 21:30

## 2018-08-21 RX ADMIN — APIXABAN 5 MILLIGRAM(S): 2.5 TABLET, FILM COATED ORAL at 10:41

## 2018-08-21 RX ADMIN — Medication 5 MILLIGRAM(S): at 13:30

## 2018-08-21 RX ADMIN — TRAMADOL HYDROCHLORIDE 25 MILLIGRAM(S): 50 TABLET ORAL at 12:20

## 2018-08-21 RX ADMIN — Medication 25 MILLIGRAM(S): at 21:31

## 2018-08-21 RX ADMIN — INSULIN GLARGINE 12 UNIT(S): 100 INJECTION, SOLUTION SUBCUTANEOUS at 21:30

## 2018-08-21 RX ADMIN — Medication 3 MILLILITER(S): at 01:00

## 2018-08-21 RX ADMIN — BUDESONIDE AND FORMOTEROL FUMARATE DIHYDRATE 2 PUFF(S): 160; 4.5 AEROSOL RESPIRATORY (INHALATION) at 06:30

## 2018-08-21 RX ADMIN — Medication 0.25 MILLIGRAM(S): at 06:30

## 2018-08-21 RX ADMIN — MONTELUKAST 10 MILLIGRAM(S): 4 TABLET, CHEWABLE ORAL at 12:28

## 2018-08-21 NOTE — PROGRESS NOTE ADULT - PROBLEM SELECTOR PLAN 1
-CT scan showed worsening opacity in RLL, CT chest showed possibility of DIEUDONNE infection, with tree in bud opacities.  -C/w cefepime 2gm IV Q8H (8/14- ) and vancomycin 1250mg Q12H (8/15- ), f/u next vanco trough) for at least 1 more day.   -Urine legionella was negative so DC'ed azithromycin.   -C/w chest PT, Acapella, duonebs, aspiration precautions, incentive spirometer.   -Appreciate pulmonary and ID recs  -AFB x 3 needed (only 2/3 sent). Requesting another sent today.   -Sputum culture sent.   -Blood cultures; NGTD.  -C/w Symbicort.  -Will start guaifenesin ER 600mg Q12H for cough.

## 2018-08-21 NOTE — DISCHARGE NOTE ADULT - NS AS DC STROKE ED MATERIALS
Risk Factors for Stroke/Call 911 for Stroke/Need for Followup After Discharge/Prescribed Medications/Stroke Education Booklet/Stroke Warning Signs and Symptoms

## 2018-08-21 NOTE — DISCHARGE NOTE ADULT - MEDICATION SUMMARY - MEDICATIONS TO STOP TAKING
I will STOP taking the medications listed below when I get home from the hospital:    Breo Ellipta 200 mcg-25 mcg/inh inhalation powder  -- 1 puff(s) inhaled once a day    Xyzal 5 mg oral tablet  -- 1 tab(s) by mouth once a day (in the evening), As Needed    Lyrica 150 mg oral capsule  -- 1 cap(s) by mouth 2 times a day    Vitamin C 500 mg oral capsule  -- 1 cap(s) by mouth once a day    Vitamin D3 5000 intl units oral capsule  -- 1 cap(s) by mouth once a day    Xolair 150 mg subcutaneous injection  -- subcutaneous every 2 weeks- to start after rehab    glucose 50% intravenous solution  -- 25 milliliter(s) intravenous once prn hypoglycemia    Pyridium 200 mg oral tablet  -- 1 tab(s) by mouth 3 times a day (after meals)    metoclopramide 5 mg oral tablet  -- 1 tab(s) by mouth 3 times a day

## 2018-08-21 NOTE — PROGRESS NOTE ADULT - PROBLEM SELECTOR PLAN 2
-There was yeast in UA.  -Urine culture sent, not showing candida, fluconazole DC'ed.   -UCx positive for E. faecalis, sensitive to ampicillin and vanco. C/w current abx for now.   -ID recs appreciated.

## 2018-08-21 NOTE — PROGRESS NOTE ADULT - ATTENDING COMMENTS
Niko Bhatia MD  Division of [facility] Medicine  Cell: [phone]  Pager: [phone]  Office: [phone]

## 2018-08-21 NOTE — DISCHARGE NOTE ADULT - ADDITIONAL INSTRUCTIONS
-Follow up with Dr. Luong and Dr. Allison. -Follow up with Dr. Luong and Dr. Allison. Continue with wound care and wound vac changes to perineal wound 3 times a week.

## 2018-08-21 NOTE — DISCHARGE NOTE ADULT - CARE PLAN
Principal Discharge DX:	UTI (urinary tract infection)  Goal:	resolution of symptroms  Assessment and plan of treatment:	HOME CARE INSTRUCTIONS  f you were prescribed antibiotics, take them exactly as your caregiver instructs you. Finish the medication even if you feel better after you have only taken some of the medication.  Drink enough water and fluids to keep your urine clear or pale yellow.  Avoid caffeine, tea, and carbonated beverages. They tend to irritate your bladder.  Empty your bladder often. Avoid holding urine for long periods of time.  Empty your bladder before and after sexual intercourse.  After a bowel movement, women should cleanse from front to back. Use each tissue only once.  SEEK MEDICAL CARE IF:  You have back pain.  You develop a fever.  Your symptoms do not begin to resolve within 3 days.  SEEK IMMEDIATE MEDICAL CARE IF:  You have severe back pain or lower abdominal pain.  You develop chills.  You have nausea or vomiting.  You have continued burning or discomfort with urination.  Secondary Diagnosis:	Pneumonia, bacterial  Assessment and plan of treatment:	Pneumonia is a lung infection that can cause a fever, cough, and trouble breathing.  Continue all antibiotics as ordered until complete.  Nutrition is important, eat small frequent meals.  Get lots of rest and drink fluids.  Call your health care provider upon arrival home from hospital and make a follow up appointment for one week.  If your cough worsens, you develop fever greater than 101', you have shaking chills, a fast heartbeat, trouble breathing and/or feel your are breathing much faster than usual, call your healthcare provider.  Make sure you wash your hands frequently.  Secondary Diagnosis:	Hypertension  Assessment and plan of treatment:	Follow up with your medical doctor to establish long term blood pressure treatment goals.  Secondary Diagnosis:	Diabetes  Assessment and plan of treatment:	HgA1C this admission.  Make sure you get your HgA1c checked every three months.  If you take oral diabetes medications, check your blood glucose two times a day.  If you take insulin, check your blood glucose before meals and at bedtime.  It's important not to skip any meals.  Keep a log of your blood glucose results and always take it with you to your doctor appointments.  Keep a list of your current medications including injectables and over the counter medications and bring this medication list with you to all your doctor appointments.  If you have not seen your ophthalmologist this year call for appointment.  Check your feet daily for redness, sores, or openings. Do not self treat. If no improvement in two days call your primary care physician for an appointment.  Low blood sugar (hypoglycemia) is a blood sugar below 70mg/dl. Check your blood sugar if you feel signs/symptoms of hypoglycemia. If your blood sugar is below 70 take 15 grams of carbohydrates (ex 4 oz of apple juice, 3-4 glucose tablets, or 4-6 oz of regular soda) wait 15 minutes and repeat blood sugar to make sure it comes up above 70.  If your blood sugar is above 70 and you are due for a meal, have a meal.  If you are not due for a meal have a snack.  This snack helps keeps your blood sugar at a safe range.  Secondary Diagnosis:	Atrial fibrillation  Assessment and plan of treatment:	Atrial fibrillation is the most common heart rhythm problem.  The condition puts you at risk for has stroke and heart attack  It helps if you control your blood pressure, not drink more than 1-2 alcohol drinks per day, cut down on caffeine, getting treatment for over active thyroid gland, and get regular exercise  Call your doctor if you feel your heart racing or beating unusually, chest tightness or pain, lightheaded, faint, shortness of breath especially with exercise  It is important to take your heart medication as prescribed  You may be on anticoagulation which is very important to take as directed - you may need blood work to monitor drug levels  Secondary Diagnosis:	Asthma with COPD  Assessment and plan of treatment:	Call your Health Care provider upon arrival home to make a follow up appointment within one week.  Take all inhalers as prescribed by your Health Care Provider.  Take steroids as prescribed by your Health Care Provider.  If your cough increases infrequency and severity and/or you have shortness of breath or increased shortness of breath call your Health Care Provider.  If you develop fever, chills, night sweats, malaise, and/or change in mental status call your Health care Provider.  Nutrition is very important.  Eat small frequent meals.  Increase your activity as tolerated.  Do not stay in bed all day  Secondary Diagnosis:	Adrenal insufficiency  Assessment and plan of treatment:	continue steroids  follow up with pMD after rehab

## 2018-08-21 NOTE — PROGRESS NOTE ADULT - ASSESSMENT
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

## 2018-08-21 NOTE — PROGRESS NOTE ADULT - ATTENDING COMMENTS
as above--  asthma, TBM, chronic bronchitis---CT c/w new PNA vs UTI (? DIEUDONNE)  F/up all cx--re send sputum for C and S and AFB x3 days; continue cefepime/vanco (covers pseudomonas)--ID luis (Afsahn Stout et al)-diflucan for yeast in urine D6 of abx  Ksees-Xwrevt-VY-on AC  PT needed    Eulalio Allison MD-Pulmonary   [phone] as above--  asthma, TBM, chronic bronchitis---CT c/w new PNA vs UTI (? DIEUDONNE)  F/up all cx--re send sputum for C and S and AFB x3 days; continue cefepime/vanco (covers pseudomonas)--ID luis (Afshan Stout et al)-diflucan for yeast in urine D7 of abx  Bjppd-Yelnut-IW-on AC  PT needed     DC pending    Eulalio Allison MD-Pulmonary   [phone]

## 2018-08-21 NOTE — DISCHARGE NOTE ADULT - PATIENT PORTAL LINK FT
You can access [facility], offered by [facility], by registering with the following website: [email or web address]

## 2018-08-21 NOTE — DISCHARGE NOTE ADULT - PLAN OF CARE
resolution of symptroms HOME CARE INSTRUCTIONS  f you were prescribed antibiotics, take them exactly as your caregiver instructs you. Finish the medication even if you feel better after you have only taken some of the medication.  Drink enough water and fluids to keep your urine clear or pale yellow.  Avoid caffeine, tea, and carbonated beverages. They tend to irritate your bladder.  Empty your bladder often. Avoid holding urine for long periods of time.  Empty your bladder before and after sexual intercourse.  After a bowel movement, women should cleanse from front to back. Use each tissue only once.  SEEK MEDICAL CARE IF:  You have back pain.  You develop a fever.  Your symptoms do not begin to resolve within 3 days.  SEEK IMMEDIATE MEDICAL CARE IF:  You have severe back pain or lower abdominal pain.  You develop chills.  You have nausea or vomiting.  You have continued burning or discomfort with urination. Pneumonia is a lung infection that can cause a fever, cough, and trouble breathing.  Continue all antibiotics as ordered until complete.  Nutrition is important, eat small frequent meals.  Get lots of rest and drink fluids.  Call your health care provider upon arrival home from hospital and make a follow up appointment for one week.  If your cough worsens, you develop fever greater than 101', you have shaking chills, a fast heartbeat, trouble breathing and/or feel your are breathing much faster than usual, call your healthcare provider.  Make sure you wash your hands frequently. Follow up with your medical doctor to establish long term blood pressure treatment goals. HgA1C this admission.  Make sure you get your HgA1c checked every three months.  If you take oral diabetes medications, check your blood glucose two times a day.  If you take insulin, check your blood glucose before meals and at bedtime.  It's important not to skip any meals.  Keep a log of your blood glucose results and always take it with you to your doctor appointments.  Keep a list of your current medications including injectables and over the counter medications and bring this medication list with you to all your doctor appointments.  If you have not seen your ophthalmologist this year call for appointment.  Check your feet daily for redness, sores, or openings. Do not self treat. If no improvement in two days call your primary care physician for an appointment.  Low blood sugar (hypoglycemia) is a blood sugar below 70mg/dl. Check your blood sugar if you feel signs/symptoms of hypoglycemia. If your blood sugar is below 70 take 15 grams of carbohydrates (ex 4 oz of apple juice, 3-4 glucose tablets, or 4-6 oz of regular soda) wait 15 minutes and repeat blood sugar to make sure it comes up above 70.  If your blood sugar is above 70 and you are due for a meal, have a meal.  If you are not due for a meal have a snack.  This snack helps keeps your blood sugar at a safe range. Atrial fibrillation is the most common heart rhythm problem.  The condition puts you at risk for has stroke and heart attack  It helps if you control your blood pressure, not drink more than 1-2 alcohol drinks per day, cut down on caffeine, getting treatment for over active thyroid gland, and get regular exercise  Call your doctor if you feel your heart racing or beating unusually, chest tightness or pain, lightheaded, faint, shortness of breath especially with exercise  It is important to take your heart medication as prescribed  You may be on anticoagulation which is very important to take as directed - you may need blood work to monitor drug levels Call your Health Care provider upon arrival home to make a follow up appointment within one week.  Take all inhalers as prescribed by your Health Care Provider.  Take steroids as prescribed by your Health Care Provider.  If your cough increases infrequency and severity and/or you have shortness of breath or increased shortness of breath call your Health Care Provider.  If you develop fever, chills, night sweats, malaise, and/or change in mental status call your Health care Provider.  Nutrition is very important.  Eat small frequent meals.  Increase your activity as tolerated.  Do not stay in bed all day continue steroids  follow up with pMD after rehab

## 2018-08-21 NOTE — PROGRESS NOTE ADULT - PROBLEM SELECTOR PLAN 5
-S/p mitomycin/xeloda and RT 5/17 to 6/17. PET 1/18 showed hypermetabolism in anal area with rad onc concerned for local relapse/failure proved by biopsy 2/18  -Underwent APR with Dr. Terrell Luong 7/24/18 with permanent end colostomy  -Purulence around wound, however it is draining what appears to been mucus  -CRS recs appreciated, PT wound care placed wound vac.   -C/w miralax, senna, and colace for constipation.   -Outpatient f/u with Dr. King.  -Ostomy care.   -Pain control PRN.   -Will advance diet to soft diet today and see how tolerates.   -Patient reports some queasiness/nausea, so will start Reglan 5mg PO TID for now. EKG Qtc 415.   -C/w zofran PRN.

## 2018-08-21 NOTE — DISCHARGE NOTE ADULT - HOSPITAL COURSE
67 year old woman with PMH of COPD, asthma on chronic steroids, tracheobronchomalacia s/p bronchial thermoplasty (10/16), Afib on Eliquis, HTN, adrenal insufficiency, DM-2, DVT, SCC anus (diagnosed 2016, s/p chemo radiation s/p APR 7/24/18); presented from rehabilitation with fevers from presumed Gram Negative Bacterial PNA.       Problem/Plan - 1:  ·  Problem: Pneumonia, bacterial.  Plan: -CT scan showed worsening opacity in RLL, CT chest showed possibility of DIEUDONNE infection, with tree in bud opacities.  -Will DC cefepime and vancomycin today (total 8 days), discussed with ID attending.   -Urine legionella was negative so DC'ed azithromycin.   -C/w Acapella, duonebs, aspiration precautions, incentive spirometer.   -Appreciate pulmonary and ID recs  -AFB x 3 needed (3/3 sent). All negative. DIEUDONNE ruled out.     -Sputum culture growing normal respiratory val.   -Blood cultures; NGTD.  -C/w Symbicort and Spiriva and Singulair.  -C/w guaifenesin ER 600mg Q12H for cough.      Problem/Plan - 2:  ·  Problem: UTI (urinary tract infection).  Plan: -There was yeast in UA.  -Urine culture sent, not showing candida, fluconazole DC'ed.   -UCx positive for E. faecalis, sensitive to ampicillin and vanco. Abx here now completed.   -ID recs appreciated.      Problem/Plan - 3:  ·  Problem: Atrial fibrillation.  Plan: -Patient has history of paroxysmal Afib  -C/w Eliquis 5mg BID.   -C/w digoxin 0.25 mg daily.      Problem/Plan - 4:  ·  Problem: Aortic insufficiency.  Plan: -TTE 5/18 reviewed, patient has moderate Aortic insufficiency with EF 63%  -Monitor clinically.      Problem/Plan - 5:  ·  Problem: Colorectal cancer.  Plan: -S/p mitomycin/xeloda and RT 5/17 to 6/17. PET 1/18 showed hypermetabolism in anal area with rad onc concerned for local relapse/failure proved by biopsy 2/18  -Underwent APR with Dr. Terrell Luong 7/24/18 with permanent end colostomy  -Purulence around wound, however it was draining what appeared to have been mucus.  -CRS recs appreciated, PT wound care placed wound vac.   -C/w miralax, senna, and colace for constipation.   -Outpatient f/u with Dr. King.  -Ostomy care.   -Pain control PRN.   -C/w soft diet, so far tolerating.   -Patient reports some queasiness/nausea, so will c/w Reglan 5mg PO TID before meals for now. EKG Qtc 415. Will repeat EKG today. So far the Reglan is helping per patient.   -C/w zofran PRN.      Problem/Plan - 6:  Problem: Constipation. Plan: -C/w senna, colace, and miralax.   -AXR from 8/16 showed ileus  -S/p Enema through ostomy done by CRS with successful increase in stool output.  -C/w soft diet, so far tolerating.   -Stool counts and monitor ostomy output.     Problem/Plan - 7:  ·  Problem: Hypertension.  Plan: -Hold anti-hypertensives as BP has been in the acceptable range.      Problem/Plan - 8:  ·  Problem: Diabetes.  Plan: -C/w RAJWINDER QAC/HS.   -HbA1c 7.6  -C/w basal-bolus insulin regimen: Lantus 12units QHS and Humalog 6units with breakfast and with lunch for now, and lispro 4 units with dinner, based on sliding scale.  -C/w Medrol 4mg PO daily for adrenal insufficiency.      Problem/Plan - 9:  ·  Problem: Generalized abdominal pain.  Plan: -Patient presented with generalized abdominal pain now improved with pain regimen.   -Avoid excessive opiates in setting of constipation.   -Bowel regimen while on opiates.   -C/w simethicone and pantoprazole.  -Reglan and zofran as above.

## 2018-08-21 NOTE — PROGRESS NOTE ADULT - PROBLEM SELECTOR PLAN 1
?PNA (may represent DIEUDONNE) vs. UTI--check all cx  D6 cefepime/vanco (p/t)  Diflucan for yeast urine ?PNA (may represent DIEUDONNE) vs. UTI--check all cx  D7 cefepime/vanco (p/t)  Diflucan for yeast urine

## 2018-08-21 NOTE — PROGRESS NOTE ADULT - PROBLEM SELECTOR PLAN 9
-Patient presented with generalized abdominal pain now improved with pain regimen.   -Avoid excessive opiates in setting of constipation.   -Bowel regimen while on opiates.   -C/w simethicone and pantoprazole.  -Reglan and zofran as above.

## 2018-08-21 NOTE — PROGRESS NOTE ADULT - ASSESSMENT
Assessment  67F s/p APR on 7/24 for rectal cancer, discharged to rehab, now admitted for pneumonia and UTI.    - Continue current bowel regimen (senna, colace, miralax)  - Care of UTI and pneumonia per medicine and pulmonary  - Please record ostomy output  - Continue with wound vac    Scott Sellers, PGY-2  [facility] [phone]

## 2018-08-21 NOTE — DISCHARGE NOTE ADULT - OTHER SIGNIFICANT FINDINGS
< from: CT Abdomen and Pelvis w/ Oral Cont (08.17.18 @ 17:51) >  IMPRESSION:    Limited examination. Proximal to the left lower quadrant colostomy bag   the colon is filled with stool and there may be possible wall thickening,   evaluation is limited without intravenous contrast or oral contrast just   distal. Proximal to this the colon is mildly distended with fluid and air   without wall thickening. Findings are new since the previous exam.   Findings may represent a partial obstructive process at the colostomy   site with associated colitis. Correlate clinically.    < end of copied text >

## 2018-08-21 NOTE — PROGRESS NOTE ADULT - SUBJECTIVE AND OBJECTIVE BOX
CHIEF COMPLAINT: f/up for PNA, asthma, allergy, chronic bronchitis, TBM--    Interval Events:    REVIEW OF SYSTEMS:  Constitutional: No fevers or chills. No weight loss. No fatigue or generalized malaise.  Eyes: No itching or discharge from the eyes  ENT: No ear pain. No ear discharge. No nasal congestion. No post nasal drip. No epistaxis. No throat pain. No sore throat. No difficulty swallowing.   CV: No chest pain. No palpitations. No lightheadedness or dizziness.   Resp: No dyspnea at rest. No dyspnea on exertion. No orthopnea. No wheezing. No cough. No stridor. No sputum production. No chest pain with respiration.  GI: No nausea. No vomiting. No diarrhea.  MSK: No joint pain or pain in any extremities  Integumentary: No skin lesions. No pedal edema.  Neurological: No gross motor weakness. No sensory changes.  [ ] All other systems negative  [ ] Unable to assess ROS because ________    OBJECTIVE:  ICU Vital Signs Last 24 Hrs  T(C): 36.8 (21 Aug 2018 05:04), Max: 37.1 (20 Aug 2018 14:07)  T(F): 98.2 (21 Aug 2018 05:04), Max: 98.8 (20 Aug 2018 21:04)  HR: 74 (21 Aug 2018 05:04) (74 - 93)  BP: 108/64 (21 Aug 2018 05:04) (103/70 - 115/70)  BP(mean): --  ABP: --  ABP(mean): --  RR: 18 (21 Aug 2018 05:04) (18 - 18)  SpO2: 96% (21 Aug 2018 05:04) (95% - 96%)        08-19 @ 07:01  -  08-20 @ 07:00  --------------------------------------------------------  IN: 600 mL / OUT: 2350 mL / NET: -1750 mL    08-20 @ 07:01  -  08-21 @ 05:40  --------------------------------------------------------  IN: 1200 mL / OUT: 2125 mL / NET: -925 mL      CAPILLARY BLOOD GLUCOSE      POCT Blood Glucose.: 114 mg/dL (20 Aug 2018 21:48)      PHYSICAL EXAM:  General: Awake, alert, oriented X 3.   HEENT: Atraumatic, normocephalic.                 Mallampatti Grade                 No nasal congestion.                No tonsillar or pharyngeal exudates.  Lymph Nodes: No palpable lymphadenopathy  Neck: No JVD. No carotid bruit.   Respiratory: Normal chest expansion                         Normal percussion                         Normal and equal air entry                         No wheeze, rhonchi or rales.  Cardiovascular: S1 S2 normal. No murmurs, rubs or gallops.   Abdomen: Soft, non-tender, non-distended. No organomegaly.  Extremities: Warm to touch. Peripheral pulse palpable. No pedal edema.   Skin: No rashes or skin lesions  Neurological: Motor and sensory examination equal and normal in all four extremities.  Psychiatry: Appropriate mood and affect.    HOSPITAL MEDICATIONS:  MEDICATIONS  (STANDING):  acetaminophen   Tablet. 1000 milliGRAM(s) Oral every 8 hours  ALBUTerol/ipratropium for Nebulization 3 milliLiter(s) Nebulizer every 6 hours  apixaban 5 milliGRAM(s) Oral every 12 hours  buDESOnide 160 MICROgram(s)/formoterol 4.5 MICROgram(s) Inhaler 2 Puff(s) Inhalation two times a day  cefepime   IVPB 2000 milliGRAM(s) IV Intermittent every 8 hours  chlorhexidine 4% Liquid 1 Application(s) Topical <User Schedule>  dextrose 5%. 1000 milliLiter(s) (50 mL/Hr) IV Continuous <Continuous>  dextrose 50% Injectable 12.5 Gram(s) IV Push once  dextrose 50% Injectable 25 Gram(s) IV Push once  dextrose 50% Injectable 25 Gram(s) IV Push once  digoxin     Tablet 0.25 milliGRAM(s) Oral daily  docusate sodium 100 milliGRAM(s) Oral three times a day  insulin glargine Injectable (LANTUS) 12 Unit(s) SubCutaneous at bedtime  insulin lispro (HumaLOG) corrective regimen sliding scale   SubCutaneous three times a day before meals  insulin lispro (HumaLOG) corrective regimen sliding scale   SubCutaneous at bedtime  insulin lispro Injectable (HumaLOG) 4 Unit(s) SubCutaneous two times a day with meals  insulin lispro Injectable (HumaLOG) 6 Unit(s) SubCutaneous with lunch  methylPREDNISolone 4 milliGRAM(s) Oral daily  montelukast 10 milliGRAM(s) Oral daily  pantoprazole    Tablet 40 milliGRAM(s) Oral before breakfast  polyethylene glycol 3350 17 Gram(s) Oral every 12 hours  senna 2 Tablet(s) Oral at bedtime  vancomycin  IVPB 1250 milliGRAM(s) IV Intermittent every 12 hours    MEDICATIONS  (PRN):  dextrose 40% Gel 15 Gram(s) Oral once PRN Blood Glucose LESS THAN 70 milliGRAM(s)/deciliter  diphenhydrAMINE   Capsule 25 milliGRAM(s) Oral every 8 hours PRN Rash and/or Itching  glucagon  Injectable 1 milliGRAM(s) IntraMuscular once PRN Glucose LESS THAN 70 milligrams/deciliter  morphine  - Injectable 1 milliGRAM(s) IV Push daily PRN wound vac/ dressing changes  ondansetron Injectable 4 milliGRAM(s) IV Push every 6 hours PRN Nausea and/or Vomiting  simethicone 160 milliGRAM(s) Chew four times a day PRN Gas  traMADol 25 milliGRAM(s) Oral every 4 hours PRN Severe Pain (7 - 10)      LABS:                        9.4    6.00  )-----------( 436      ( 20 Aug 2018 07:52 )             30.9     08-20    141  |  101  |  6<L>  ----------------------------<  114<H>  4.0   |  29  |  0.58    Ca    9.5      20 Aug 2018 06:50                MICROBIOLOGY:     RADIOLOGY:  [ ] Reviewed and interpreted by me    Point of Care Ultrasound Findings:    PFT:    EKG: CHIEF COMPLAINT: f/up for PNA, asthma, allergy, chronic bronchitis, TBM--much better--minimal cough but no wheeze or sob; less urine issues    Interval Events: minimal ambulation    REVIEW OF SYSTEMS:  Constitutional: No fevers or chills. No weight loss. No fatigue or generalized malaise.  Eyes: No itching or discharge from the eyes  ENT: No ear pain. No ear discharge. No nasal congestion. No post nasal drip. No epistaxis. No throat pain. No sore throat. No difficulty swallowing.   CV: No chest pain. No palpitations. No lightheadedness or dizziness.   Resp: No dyspnea at rest. + dyspnea on exertion. No orthopnea. No wheezing. No cough. No stridor. No sputum production. No chest pain with respiration.  GI: No nausea. No vomiting. No diarrhea.  MSK: No joint pain or pain in any extremities  Integumentary: No skin lesions. No pedal edema.  Neurological: No gross motor weakness. No sensory changes.  [+ ] All other systems negative  [ ] Unable to assess ROS because ________    OBJECTIVE:  ICU Vital Signs Last 24 Hrs  T(C): 36.8 (21 Aug 2018 05:04), Max: 37.1 (20 Aug 2018 14:07)  T(F): 98.2 (21 Aug 2018 05:04), Max: 98.8 (20 Aug 2018 21:04)  HR: 74 (21 Aug 2018 05:04) (74 - 93)  BP: 108/64 (21 Aug 2018 05:04) (103/70 - 115/70)  BP(mean): --  ABP: --  ABP(mean): --  RR: 18 (21 Aug 2018 05:04) (18 - 18)  SpO2: 96% (21 Aug 2018 05:04) (95% - 96%)        08-19 @ 07:01  -  08-20 @ 07:00  --------------------------------------------------------  IN: 600 mL / OUT: 2350 mL / NET: -1750 mL    08-20 @ 07:01  -  08-21 @ 05:40  --------------------------------------------------------  IN: 1200 mL / OUT: 2125 mL / NET: -925 mL      CAPILLARY BLOOD GLUCOSE      POCT Blood Glucose.: 114 mg/dL (20 Aug 2018 21:48)      PHYSICAL EXAM: NAD in bed-RA  General: Awake, alert, oriented X 3.   HEENT: Atraumatic, normocephalic.                 Mallampatti Grade                 No nasal congestion.                No tonsillar or pharyngeal exudates.  Lymph Nodes: No palpable lymphadenopathy  Neck: No JVD. No carotid bruit.   Respiratory: Normal chest expansion                         Normal percussion                         Normal and equal air entry                         No wheeze, rhonchi but slight LLL rales.  Cardiovascular: S1 S2 normal. + murmurs, rubs or gallops.   Abdomen: Soft, non-tender, non-distended. No organomegaly. NABS  Extremities: Warm to touch. Peripheral pulse palpable. No pedal edema.   Skin: No rashes or skin lesions  Neurological: Motor and sensory examination equal and normal in all four extremities.  Psychiatry: Appropriate mood and affect.    HOSPITAL MEDICATIONS:  MEDICATIONS  (STANDING):  acetaminophen   Tablet. 1000 milliGRAM(s) Oral every 8 hours  ALBUTerol/ipratropium for Nebulization 3 milliLiter(s) Nebulizer every 6 hours  apixaban 5 milliGRAM(s) Oral every 12 hours  buDESOnide 160 MICROgram(s)/formoterol 4.5 MICROgram(s) Inhaler 2 Puff(s) Inhalation two times a day  cefepime   IVPB 2000 milliGRAM(s) IV Intermittent every 8 hours  chlorhexidine 4% Liquid 1 Application(s) Topical <User Schedule>  dextrose 5%. 1000 milliLiter(s) (50 mL/Hr) IV Continuous <Continuous>  dextrose 50% Injectable 12.5 Gram(s) IV Push once  dextrose 50% Injectable 25 Gram(s) IV Push once  dextrose 50% Injectable 25 Gram(s) IV Push once  digoxin     Tablet 0.25 milliGRAM(s) Oral daily  docusate sodium 100 milliGRAM(s) Oral three times a day  insulin glargine Injectable (LANTUS) 12 Unit(s) SubCutaneous at bedtime  insulin lispro (HumaLOG) corrective regimen sliding scale   SubCutaneous three times a day before meals  insulin lispro (HumaLOG) corrective regimen sliding scale   SubCutaneous at bedtime  insulin lispro Injectable (HumaLOG) 4 Unit(s) SubCutaneous two times a day with meals  insulin lispro Injectable (HumaLOG) 6 Unit(s) SubCutaneous with lunch  methylPREDNISolone 4 milliGRAM(s) Oral daily  montelukast 10 milliGRAM(s) Oral daily  pantoprazole    Tablet 40 milliGRAM(s) Oral before breakfast  polyethylene glycol 3350 17 Gram(s) Oral every 12 hours  senna 2 Tablet(s) Oral at bedtime  vancomycin  IVPB 1250 milliGRAM(s) IV Intermittent every 12 hours    MEDICATIONS  (PRN):  dextrose 40% Gel 15 Gram(s) Oral once PRN Blood Glucose LESS THAN 70 milliGRAM(s)/deciliter  diphenhydrAMINE   Capsule 25 milliGRAM(s) Oral every 8 hours PRN Rash and/or Itching  glucagon  Injectable 1 milliGRAM(s) IntraMuscular once PRN Glucose LESS THAN 70 milligrams/deciliter  morphine  - Injectable 1 milliGRAM(s) IV Push daily PRN wound vac/ dressing changes  ondansetron Injectable 4 milliGRAM(s) IV Push every 6 hours PRN Nausea and/or Vomiting  simethicone 160 milliGRAM(s) Chew four times a day PRN Gas  traMADol 25 milliGRAM(s) Oral every 4 hours PRN Severe Pain (7 - 10)      LABS:                        9.4    6.00  )-----------( 436      ( 20 Aug 2018 07:52 )             30.9     08-20    141  |  101  |  6<L>  ----------------------------<  114<H>  4.0   |  29  |  0.58    Ca    9.5      20 Aug 2018 06:50                MICROBIOLOGY:     RADIOLOGY:  [ ] Reviewed and interpreted by me    Point of Care Ultrasound Findings:    PFT:    EKG:

## 2018-08-21 NOTE — PROGRESS NOTE ADULT - SUBJECTIVE AND OBJECTIVE BOX
Patient is a 67y old  Female who presents with a chief complaint of UTI (14 Aug 2018 21:17)        SUBJECTIVE / OVERNIGHT EVENTS: Patient says she still feels queasy with eating. She says in the morning she felt wheezy and was having a cough. She denies CP.       MEDICATIONS  (STANDING):  acetaminophen   Tablet. 1000 milliGRAM(s) Oral every 8 hours  ALBUTerol/ipratropium for Nebulization 3 milliLiter(s) Nebulizer every 6 hours  apixaban 5 milliGRAM(s) Oral every 12 hours  buDESOnide 160 MICROgram(s)/formoterol 4.5 MICROgram(s) Inhaler 2 Puff(s) Inhalation two times a day  cefepime   IVPB 2000 milliGRAM(s) IV Intermittent every 8 hours  chlorhexidine 4% Liquid 1 Application(s) Topical <User Schedule>  dextrose 5%. 1000 milliLiter(s) (50 mL/Hr) IV Continuous <Continuous>  dextrose 50% Injectable 12.5 Gram(s) IV Push once  dextrose 50% Injectable 25 Gram(s) IV Push once  dextrose 50% Injectable 25 Gram(s) IV Push once  digoxin     Tablet 0.25 milliGRAM(s) Oral daily  docusate sodium 100 milliGRAM(s) Oral three times a day  guaiFENesin  milliGRAM(s) Oral every 12 hours  insulin glargine Injectable (LANTUS) 12 Unit(s) SubCutaneous at bedtime  insulin lispro (HumaLOG) corrective regimen sliding scale   SubCutaneous three times a day before meals  insulin lispro (HumaLOG) corrective regimen sliding scale   SubCutaneous at bedtime  insulin lispro Injectable (HumaLOG) 4 Unit(s) SubCutaneous two times a day with meals  insulin lispro Injectable (HumaLOG) 6 Unit(s) SubCutaneous with lunch  methylPREDNISolone 4 milliGRAM(s) Oral daily  metoclopramide 5 milliGRAM(s) Oral three times a day  montelukast 10 milliGRAM(s) Oral daily  pantoprazole    Tablet 40 milliGRAM(s) Oral before breakfast  polyethylene glycol 3350 17 Gram(s) Oral every 12 hours  senna 2 Tablet(s) Oral at bedtime  vancomycin  IVPB 1250 milliGRAM(s) IV Intermittent every 12 hours    MEDICATIONS  (PRN):  dextrose 40% Gel 15 Gram(s) Oral once PRN Blood Glucose LESS THAN 70 milliGRAM(s)/deciliter  diphenhydrAMINE   Capsule 25 milliGRAM(s) Oral every 8 hours PRN Rash and/or Itching  glucagon  Injectable 1 milliGRAM(s) IntraMuscular once PRN Glucose LESS THAN 70 milligrams/deciliter  morphine  - Injectable 1 milliGRAM(s) IV Push daily PRN wound vac/ dressing changes  ondansetron Injectable 4 milliGRAM(s) IV Push every 6 hours PRN Nausea and/or Vomiting  simethicone 160 milliGRAM(s) Chew four times a day PRN Gas  traMADol 25 milliGRAM(s) Oral every 4 hours PRN Severe Pain (7 - 10)      Vital Signs Last 24 Hrs  T(C): 37.1 (21 Aug 2018 11:36), Max: 37.1 (20 Aug 2018 14:07)  T(F): 98.7 (21 Aug 2018 11:36), Max: 98.8 (20 Aug 2018 21:04)  HR: 88 (21 Aug 2018 11:36) (74 - 93)  BP: 111/69 (21 Aug 2018 11:36) (103/70 - 115/70)  BP(mean): --  RR: 18 (21 Aug 2018 11:36) (18 - 18)  SpO2: 96% (21 Aug 2018 11:36) (95% - 96%)  CAPILLARY BLOOD GLUCOSE      POCT Blood Glucose.: 360 mg/dL (21 Aug 2018 11:51)  POCT Blood Glucose.: 149 mg/dL (21 Aug 2018 07:48)  POCT Blood Glucose.: 114 mg/dL (20 Aug 2018 21:48)  POCT Blood Glucose.: 178 mg/dL (20 Aug 2018 17:46)  POCT Blood Glucose.: 144 mg/dL (20 Aug 2018 16:42)    I&O's Summary    20 Aug 2018 07:01  -  21 Aug 2018 07:00  --------------------------------------------------------  IN: 1320 mL / OUT: 2625 mL / NET: -1305 mL    21 Aug 2018 07:01  -  21 Aug 2018 13:53  --------------------------------------------------------  IN: 880 mL / OUT: 600 mL / NET: 280 mL          PHYSICAL EXAM:  GENERAL: NAD, well-developed  HEAD: Atraumatic, Normocephalic  EYES: Conjunctiva and sclera clear  NECK: Supple  CHEST/LUNG: Coarse breath sounds in RLB. Crackles. Distant and decreased BS.   HEART: Regular rate and rhythm; No murmurs, rubs, or gallops  ABDOMEN: Soft, Nontender, Nondistended; Bowel sounds present; ostomy present.   EXTREMITIES: No clubbing, cyanosis, or edema  PSYCH/Neuro: AAOx3. Non-focal.   SKIN: No rashes or lesions      LABS:                        10.0   6.72  )-----------( 395      ( 21 Aug 2018 07:51 )             32.5     08-21    139  |  99  |  6<L>  ----------------------------<  122<H>  4.2   |  28  |  0.59    Ca    9.2      21 Aug 2018 07:17            RADIOLOGY & ADDITIONAL TESTS:    Imaging Personally Reviewed:   Consultant(s) Notes Reviewed: Pulmonary, CRS  Care Discussed with Consultants/Other Providers:

## 2018-08-21 NOTE — DISCHARGE NOTE ADULT - CARE PROVIDER_API CALL
Terrell Luong), ColonRectal Surgery; Surgery  [address]  [address]  [place] [address]  Phone: [phone]  Fax: [phone]    Eulalio Allison), Internal Medicine; Pulmonary Disease  [address]  [address]  [place] [address]  Phone: [phone]  Fax: [phone]

## 2018-08-21 NOTE — PROGRESS NOTE ADULT - PROBLEM SELECTOR PLAN 10
-Diet: Will advance to soft diabetic diet today.   -VTE PPx: On Eliquis 5mg BID.  -PT evantwan WALLACE. Wound vac in place.

## 2018-08-21 NOTE — DISCHARGE NOTE ADULT - MEDICATION SUMMARY - MEDICATIONS TO TAKE
I will START or STAY ON the medications listed below when I get home from the hospital:    methylPREDNISolone 4 mg oral tablet  -- 1 tab(s) by mouth once a day  -- Indication: For Adrenal insufficiency    traMADol 50 mg oral tablet  -- 0.5 tab(s) by mouth every 4 hours, As needed, Severe Pain (7 - 10)  -- Indication: For Pain    acetaminophen 325 mg oral tablet  -- 2 tab(s) by mouth every 6 hours, As needed, Moderate Pain (4 - 6)  -- Indication: For Pain    digoxin 250 mcg (0.25 mg) oral tablet  -- 1 tab(s) by mouth once a day  -- Indication: For Atrial fibrillation    apixaban 5 mg oral tablet  -- 1 tab(s) by mouth every 12 hours  -- Indication: For Atrial fibrillation    diphenhydrAMINE 25 mg oral capsule  -- 1 cap(s) by mouth every 8 hours, As needed, Rash and/or Itching  -- Indication: For itching    metoclopramide 5 mg oral tablet  -- 1 tab(s) by mouth 3 times a day  -- Indication: For nausea    budesonide-formoterol 160 mcg-4.5 mcg/inh inhalation aerosol  -- 2 puff(s) inhaled 2 times a day  -- Indication: For Asthma with COPD    ipratropium-albuterol 0.5 mg-2.5 mg/3 mLinhalation solution  -- 3 milliliter(s) inhaled every 6 hours  -- Indication: For Asthma with COPD    tiotropium 18 mcg inhalation capsule  -- 1 cap(s) inhaled once a day  -- Indication: For Asthma with COPD    hydrocortisone 2.5% topical ointment  -- 1 application on skin every 6 hours, As needed, Itching  -- Indication: For rash    guaiFENesin 600 mg oral tablet, extended release  -- 1 tab(s) by mouth every 12 hours  -- Indication: For Pneumonia, bacterial    polyethylene glycol 3350 oral powder for reconstitution  -- 17 gram(s) by mouth every 12 hours  -- Indication: For Constipation    senna oral tablet  -- 2 tab(s) by mouth once a day (at bedtime)  -- Indication: For Constipation    docusate sodium 100 mg oral capsule  -- 1 cap(s) by mouth 3 times a day  -- Indication: For Constipation    Singulair 10 mg oral tablet  -- 1 tab(s) by mouth once a day  -- Indication: For Asthma with COPD    simethicone 80 mg oral tablet, chewable  -- 2 tab(s) by mouth 4 times a day, As needed, Gas  -- Indication: For Gas    Protonix 40 mg oral delayed release tablet  -- 1 tab(s) by mouth once a day  -- Indication: For Gerd

## 2018-08-21 NOTE — DISCHARGE NOTE ADULT - CARE PROVIDERS DIRECT ADDRESSES
[email or web address]

## 2018-08-21 NOTE — PROGRESS NOTE ADULT - SUBJECTIVE AND OBJECTIVE BOX
Surgery Progress Note    S: Patient seen and examined. No acute events overnight.     O:  Vital Signs Last 24 Hrs  T(C): 36.8 (21 Aug 2018 05:04), Max: 37.1 (20 Aug 2018 14:07)  T(F): 98.2 (21 Aug 2018 05:04), Max: 98.8 (20 Aug 2018 21:04)  HR: 74 (21 Aug 2018 05:04) (74 - 93)  BP: 108/64 (21 Aug 2018 05:04) (103/70 - 115/70)  BP(mean): --  RR: 18 (21 Aug 2018 05:04) (18 - 18)  SpO2: 96% (21 Aug 2018 05:04) (95% - 96%)    I&O's Detail    19 Aug 2018 07:01  -  20 Aug 2018 07:00  --------------------------------------------------------  IN:    IV PiggyBack: 300 mL    Oral Fluid: 300 mL  Total IN: 600 mL    OUT:    Colostomy: 800 mL    Voided: 1550 mL  Total OUT: 2350 mL    Total NET: -1750 mL      20 Aug 2018 07:01  -  21 Aug 2018 06:33  --------------------------------------------------------  IN:    IV PiggyBack: 300 mL    Oral Fluid: 900 mL  Total IN: 1200 mL    OUT:    Colostomy: 625 mL    Voided: 1500 mL  Total OUT: 2125 mL    Total NET: -925 mL          MEDICATIONS  (STANDING):  acetaminophen   Tablet. 1000 milliGRAM(s) Oral every 8 hours  ALBUTerol/ipratropium for Nebulization 3 milliLiter(s) Nebulizer every 6 hours  apixaban 5 milliGRAM(s) Oral every 12 hours  buDESOnide 160 MICROgram(s)/formoterol 4.5 MICROgram(s) Inhaler 2 Puff(s) Inhalation two times a day  cefepime   IVPB 2000 milliGRAM(s) IV Intermittent every 8 hours  chlorhexidine 4% Liquid 1 Application(s) Topical <User Schedule>  dextrose 5%. 1000 milliLiter(s) (50 mL/Hr) IV Continuous <Continuous>  dextrose 50% Injectable 12.5 Gram(s) IV Push once  dextrose 50% Injectable 25 Gram(s) IV Push once  dextrose 50% Injectable 25 Gram(s) IV Push once  digoxin     Tablet 0.25 milliGRAM(s) Oral daily  docusate sodium 100 milliGRAM(s) Oral three times a day  insulin glargine Injectable (LANTUS) 12 Unit(s) SubCutaneous at bedtime  insulin lispro (HumaLOG) corrective regimen sliding scale   SubCutaneous three times a day before meals  insulin lispro (HumaLOG) corrective regimen sliding scale   SubCutaneous at bedtime  insulin lispro Injectable (HumaLOG) 4 Unit(s) SubCutaneous two times a day with meals  insulin lispro Injectable (HumaLOG) 6 Unit(s) SubCutaneous with lunch  methylPREDNISolone 4 milliGRAM(s) Oral daily  montelukast 10 milliGRAM(s) Oral daily  pantoprazole    Tablet 40 milliGRAM(s) Oral before breakfast  polyethylene glycol 3350 17 Gram(s) Oral every 12 hours  senna 2 Tablet(s) Oral at bedtime  vancomycin  IVPB 1250 milliGRAM(s) IV Intermittent every 12 hours    MEDICATIONS  (PRN):  dextrose 40% Gel 15 Gram(s) Oral once PRN Blood Glucose LESS THAN 70 milliGRAM(s)/deciliter  diphenhydrAMINE   Capsule 25 milliGRAM(s) Oral every 8 hours PRN Rash and/or Itching  glucagon  Injectable 1 milliGRAM(s) IntraMuscular once PRN Glucose LESS THAN 70 milligrams/deciliter  morphine  - Injectable 1 milliGRAM(s) IV Push daily PRN wound vac/ dressing changes  ondansetron Injectable 4 milliGRAM(s) IV Push every 6 hours PRN Nausea and/or Vomiting  simethicone 160 milliGRAM(s) Chew four times a day PRN Gas  traMADol 25 milliGRAM(s) Oral every 4 hours PRN Severe Pain (7 - 10)                            9.4    6.00  )-----------( 436      ( 20 Aug 2018 07:52 )             30.9       08-20    141  |  101  |  6<L>  ----------------------------<  114<H>  4.0   |  29  |  0.58    Ca    9.5      20 Aug 2018 06:50        Physical Exam:  Gen: Laying in bed, NAD  Resp: Unlabored breathing  Abd: soft, NTND. Well-healed surgical scar. Ostomy pink/viable with gas and stool in bag.  Ext: WWP  Skin: No rashes Surgery Progress Note    S: Patient seen and examined. No acute events overnight. Patient reports feeling better than yesterday overall. She reports her abdomen is less distended since receiving an enema.    O:  Vital Signs Last 24 Hrs  T(C): 36.8 (21 Aug 2018 05:04), Max: 37.1 (20 Aug 2018 14:07)  T(F): 98.2 (21 Aug 2018 05:04), Max: 98.8 (20 Aug 2018 21:04)  HR: 74 (21 Aug 2018 05:04) (74 - 93)  BP: 108/64 (21 Aug 2018 05:04) (103/70 - 115/70)  BP(mean): --  RR: 18 (21 Aug 2018 05:04) (18 - 18)  SpO2: 96% (21 Aug 2018 05:04) (95% - 96%)    I&O's Detail    19 Aug 2018 07:01  -  20 Aug 2018 07:00  --------------------------------------------------------  IN:    IV PiggyBack: 300 mL    Oral Fluid: 300 mL  Total IN: 600 mL    OUT:    Colostomy: 800 mL    Voided: 1550 mL  Total OUT: 2350 mL    Total NET: -1750 mL      20 Aug 2018 07:01  -  21 Aug 2018 06:33  --------------------------------------------------------  IN:    IV PiggyBack: 300 mL    Oral Fluid: 900 mL  Total IN: 1200 mL    OUT:    Colostomy: 625 mL    Voided: 1500 mL  Total OUT: 2125 mL    Total NET: -925 mL          MEDICATIONS  (STANDING):  acetaminophen   Tablet. 1000 milliGRAM(s) Oral every 8 hours  ALBUTerol/ipratropium for Nebulization 3 milliLiter(s) Nebulizer every 6 hours  apixaban 5 milliGRAM(s) Oral every 12 hours  buDESOnide 160 MICROgram(s)/formoterol 4.5 MICROgram(s) Inhaler 2 Puff(s) Inhalation two times a day  cefepime   IVPB 2000 milliGRAM(s) IV Intermittent every 8 hours  chlorhexidine 4% Liquid 1 Application(s) Topical <User Schedule>  dextrose 5%. 1000 milliLiter(s) (50 mL/Hr) IV Continuous <Continuous>  dextrose 50% Injectable 12.5 Gram(s) IV Push once  dextrose 50% Injectable 25 Gram(s) IV Push once  dextrose 50% Injectable 25 Gram(s) IV Push once  digoxin     Tablet 0.25 milliGRAM(s) Oral daily  docusate sodium 100 milliGRAM(s) Oral three times a day  insulin glargine Injectable (LANTUS) 12 Unit(s) SubCutaneous at bedtime  insulin lispro (HumaLOG) corrective regimen sliding scale   SubCutaneous three times a day before meals  insulin lispro (HumaLOG) corrective regimen sliding scale   SubCutaneous at bedtime  insulin lispro Injectable (HumaLOG) 4 Unit(s) SubCutaneous two times a day with meals  insulin lispro Injectable (HumaLOG) 6 Unit(s) SubCutaneous with lunch  methylPREDNISolone 4 milliGRAM(s) Oral daily  montelukast 10 milliGRAM(s) Oral daily  pantoprazole    Tablet 40 milliGRAM(s) Oral before breakfast  polyethylene glycol 3350 17 Gram(s) Oral every 12 hours  senna 2 Tablet(s) Oral at bedtime  vancomycin  IVPB 1250 milliGRAM(s) IV Intermittent every 12 hours    MEDICATIONS  (PRN):  dextrose 40% Gel 15 Gram(s) Oral once PRN Blood Glucose LESS THAN 70 milliGRAM(s)/deciliter  diphenhydrAMINE   Capsule 25 milliGRAM(s) Oral every 8 hours PRN Rash and/or Itching  glucagon  Injectable 1 milliGRAM(s) IntraMuscular once PRN Glucose LESS THAN 70 milligrams/deciliter  morphine  - Injectable 1 milliGRAM(s) IV Push daily PRN wound vac/ dressing changes  ondansetron Injectable 4 milliGRAM(s) IV Push every 6 hours PRN Nausea and/or Vomiting  simethicone 160 milliGRAM(s) Chew four times a day PRN Gas  traMADol 25 milliGRAM(s) Oral every 4 hours PRN Severe Pain (7 - 10)                            9.4    6.00  )-----------( 436      ( 20 Aug 2018 07:52 )             30.9       08-20    141  |  101  |  6<L>  ----------------------------<  114<H>  4.0   |  29  |  0.58    Ca    9.5      20 Aug 2018 06:50        Physical Exam:  Gen: Laying in bed, NAD  Resp: Unlabored breathing  Abd: soft, NTND. Well-healed surgical scar. Ostomy pink/viable with gas and stool in bag.  Ext: WWP  Skin: No rashes

## 2018-08-21 NOTE — PROGRESS NOTE ADULT - PROBLEM SELECTOR PLAN 6
-C/w senna, colace, and miralax.   -AXR from 8/16 showed ileus  -S/p Enema through ostomy done by CRS with successful increase in stool output.  -Will advance diet to soft diet today and see how tolerates.   -Stool counts.

## 2018-08-21 NOTE — PROGRESS NOTE ADULT - PROBLEM SELECTOR PLAN 8
-C/w RAJWINDER QAC/HS.   -HbA1c 7.6  -C/w basal-bolus insulin regimen: Lantus 12units QHS and Humalog 6units with breakfast and with lunch for now, and lispro 4 units with dinner, based on sliding scale.  -C/w Medrol 4mg PO daily for adrenal insufficiency.

## 2018-08-21 NOTE — DISCHARGE NOTE ADULT - SECONDARY DIAGNOSIS.
Pneumonia, bacterial Hypertension Diabetes Atrial fibrillation Asthma with COPD Adrenal insufficiency

## 2018-08-21 NOTE — DISCHARGE NOTE ADULT - NS AS DC FOLLOWUP STROKE INST
Influenza vaccination (VIS Pub Date: August 7, 2015)/Stroke (includes: TIA/SAH/ICH/Ischemic Stroke)/Smoking Cessation Influenza vaccination (VIS Pub Date: August 7, 2015)/Smoking Cessation Smoking Cessation

## 2018-08-22 LAB
GLUCOSE BLDC GLUCOMTR-MCNC: 143 MG/DL — HIGH (ref 70–99)
GLUCOSE BLDC GLUCOMTR-MCNC: 206 MG/DL — HIGH (ref 70–99)
GLUCOSE BLDC GLUCOMTR-MCNC: 224 MG/DL — HIGH (ref 70–99)
GLUCOSE BLDC GLUCOMTR-MCNC: 286 MG/DL — HIGH (ref 70–99)
NIGHT BLUE STAIN TISS: SIGNIFICANT CHANGE UP
SPECIMEN SOURCE: SIGNIFICANT CHANGE UP
VANCOMYCIN TROUGH SERPL-MCNC: 17.9 UG/ML — SIGNIFICANT CHANGE UP (ref 10–20)

## 2018-08-22 PROCEDURE — 99232 SBSQ HOSP IP/OBS MODERATE 35: CPT

## 2018-08-22 RX ORDER — DIPHENHYDRAMINE HCL 50 MG
25 CAPSULE ORAL ONCE
Qty: 0 | Refills: 0 | Status: COMPLETED | OUTPATIENT
Start: 2018-08-22 | End: 2018-08-22

## 2018-08-22 RX ORDER — TIOTROPIUM BROMIDE 18 UG/1
1 CAPSULE ORAL; RESPIRATORY (INHALATION) DAILY
Qty: 0 | Refills: 0 | Status: DISCONTINUED | OUTPATIENT
Start: 2018-08-22 | End: 2018-08-23

## 2018-08-22 RX ORDER — HYDROCORTISONE 1 %
1 OINTMENT (GRAM) TOPICAL EVERY 6 HOURS
Qty: 0 | Refills: 0 | Status: DISCONTINUED | OUTPATIENT
Start: 2018-08-22 | End: 2018-08-23

## 2018-08-22 RX ORDER — INSULIN LISPRO 100/ML
6 VIAL (ML) SUBCUTANEOUS ONCE
Qty: 0 | Refills: 0 | Status: COMPLETED | OUTPATIENT
Start: 2018-08-22 | End: 2018-08-22

## 2018-08-22 RX ADMIN — Medication 6 UNIT(S): at 12:18

## 2018-08-22 RX ADMIN — Medication 5 MILLIGRAM(S): at 17:21

## 2018-08-22 RX ADMIN — SIMETHICONE 160 MILLIGRAM(S): 80 TABLET, CHEWABLE ORAL at 11:25

## 2018-08-22 RX ADMIN — CEFEPIME 100 MILLIGRAM(S): 1 INJECTION, POWDER, FOR SOLUTION INTRAMUSCULAR; INTRAVENOUS at 05:19

## 2018-08-22 RX ADMIN — Medication 25 MILLIGRAM(S): at 10:00

## 2018-08-22 RX ADMIN — Medication 0.25 MILLIGRAM(S): at 05:21

## 2018-08-22 RX ADMIN — Medication 5 MILLIGRAM(S): at 08:17

## 2018-08-22 RX ADMIN — Medication 166.67 MILLIGRAM(S): at 11:12

## 2018-08-22 RX ADMIN — Medication 6 UNIT(S): at 08:19

## 2018-08-22 RX ADMIN — Medication 600 MILLIGRAM(S): at 05:21

## 2018-08-22 RX ADMIN — Medication 4 UNIT(S): at 17:05

## 2018-08-22 RX ADMIN — Medication 5 MILLIGRAM(S): at 11:16

## 2018-08-22 RX ADMIN — Medication 3 MILLILITER(S): at 23:02

## 2018-08-22 RX ADMIN — Medication 3 MILLILITER(S): at 17:05

## 2018-08-22 RX ADMIN — Medication 1000 MILLIGRAM(S): at 13:41

## 2018-08-22 RX ADMIN — BUDESONIDE AND FORMOTEROL FUMARATE DIHYDRATE 2 PUFF(S): 160; 4.5 AEROSOL RESPIRATORY (INHALATION) at 05:19

## 2018-08-22 RX ADMIN — INSULIN GLARGINE 12 UNIT(S): 100 INJECTION, SOLUTION SUBCUTANEOUS at 22:41

## 2018-08-22 RX ADMIN — POLYETHYLENE GLYCOL 3350 17 GRAM(S): 17 POWDER, FOR SOLUTION ORAL at 22:39

## 2018-08-22 RX ADMIN — APIXABAN 5 MILLIGRAM(S): 2.5 TABLET, FILM COATED ORAL at 22:39

## 2018-08-22 RX ADMIN — Medication 600 MILLIGRAM(S): at 17:07

## 2018-08-22 RX ADMIN — Medication 25 MILLIGRAM(S): at 15:55

## 2018-08-22 RX ADMIN — Medication 2: at 08:18

## 2018-08-22 RX ADMIN — TRAMADOL HYDROCHLORIDE 25 MILLIGRAM(S): 50 TABLET ORAL at 09:24

## 2018-08-22 RX ADMIN — Medication 1000 MILLIGRAM(S): at 14:41

## 2018-08-22 RX ADMIN — Medication 3 MILLILITER(S): at 05:20

## 2018-08-22 RX ADMIN — SIMETHICONE 160 MILLIGRAM(S): 80 TABLET, CHEWABLE ORAL at 17:23

## 2018-08-22 RX ADMIN — BUDESONIDE AND FORMOTEROL FUMARATE DIHYDRATE 2 PUFF(S): 160; 4.5 AEROSOL RESPIRATORY (INHALATION) at 17:05

## 2018-08-22 RX ADMIN — MONTELUKAST 10 MILLIGRAM(S): 4 TABLET, CHEWABLE ORAL at 11:29

## 2018-08-22 RX ADMIN — Medication 4 MILLIGRAM(S): at 05:21

## 2018-08-22 RX ADMIN — PANTOPRAZOLE SODIUM 40 MILLIGRAM(S): 20 TABLET, DELAYED RELEASE ORAL at 06:08

## 2018-08-22 RX ADMIN — Medication 100 MILLIGRAM(S): at 22:39

## 2018-08-22 RX ADMIN — POLYETHYLENE GLYCOL 3350 17 GRAM(S): 17 POWDER, FOR SOLUTION ORAL at 10:00

## 2018-08-22 RX ADMIN — Medication 3: at 12:17

## 2018-08-22 RX ADMIN — Medication 100 MILLIGRAM(S): at 13:41

## 2018-08-22 RX ADMIN — APIXABAN 5 MILLIGRAM(S): 2.5 TABLET, FILM COATED ORAL at 10:00

## 2018-08-22 RX ADMIN — Medication 3 MILLILITER(S): at 11:16

## 2018-08-22 RX ADMIN — Medication 3 MILLILITER(S): at 00:22

## 2018-08-22 RX ADMIN — MORPHINE SULFATE 1 MILLIGRAM(S): 50 CAPSULE, EXTENDED RELEASE ORAL at 09:17

## 2018-08-22 RX ADMIN — MORPHINE SULFATE 1 MILLIGRAM(S): 50 CAPSULE, EXTENDED RELEASE ORAL at 09:02

## 2018-08-22 RX ADMIN — TRAMADOL HYDROCHLORIDE 25 MILLIGRAM(S): 50 TABLET ORAL at 08:24

## 2018-08-22 RX ADMIN — SENNA PLUS 2 TABLET(S): 8.6 TABLET ORAL at 22:39

## 2018-08-22 RX ADMIN — SIMETHICONE 160 MILLIGRAM(S): 80 TABLET, CHEWABLE ORAL at 06:08

## 2018-08-22 NOTE — PROGRESS NOTE ADULT - ASSESSMENT
67F asthma/COPD, tracheomalacia s/p tracheobronchoplasty 10/2016, chronic cough with sputum production, Atrial fibrillation on Eliquis, Adrenal insufficiency on chronic steroids, DM2, HTN, squamous cell CA of anus s/p chemo/XRT and abdominoperineal resection now presenting with fevers and found to have UTI and pneumonia - incidentally noted to have minimal ostomy output    8/16-ID-cefepime/vanco/diflucan (yeast in urine)  8/20-improving-over all8/21-completing abx today, sputum neg x2 for afb  8/22-D8 of abx--?completed course

## 2018-08-22 NOTE — PROGRESS NOTE ADULT - PROBLEM SELECTOR PLAN 1
-CT scan showed worsening opacity in RLL, CT chest showed possibility of DIEUDONNE infection, with tree in bud opacities.  -Will DC cefepime and vancomycin today (total 8 days), discussed with ID attending.   -Urine legionella was negative so DC'ed azithromycin.   -C/w Acapella, duonebs, aspiration precautions, incentive spirometer.   -Appreciate pulmonary and ID recs  -AFB x 3 needed (3/3 sent). All negative. DIEUDONNE ruled out.     -Sputum culture growing normal respiratory val.   -Blood cultures; NGTD.  -C/w Symbicort and Spiriva and Singulair.  -C/w guaifenesin ER 600mg Q12H for cough.

## 2018-08-22 NOTE — PROGRESS NOTE ADULT - SUBJECTIVE AND OBJECTIVE BOX
Patient is a 67y old  Female who presents with a chief complaint of UTI (21 Aug 2018 15:32)        SUBJECTIVE / OVERNIGHT EVENTS: Patient says she used some tramadol in the morning for some abdominal discomfort. She says she's so far tolerating soft diet well with the reglan which is helping her nausea/queasiness. She says her ostomy output is less so far today. She says her SOB and cough is better.       MEDICATIONS  (STANDING):  acetaminophen   Tablet. 1000 milliGRAM(s) Oral every 8 hours  ALBUTerol/ipratropium for Nebulization 3 milliLiter(s) Nebulizer every 6 hours  apixaban 5 milliGRAM(s) Oral every 12 hours  buDESOnide 160 MICROgram(s)/formoterol 4.5 MICROgram(s) Inhaler 2 Puff(s) Inhalation two times a day  chlorhexidine 4% Liquid 1 Application(s) Topical <User Schedule>  dextrose 5%. 1000 milliLiter(s) (50 mL/Hr) IV Continuous <Continuous>  dextrose 50% Injectable 12.5 Gram(s) IV Push once  dextrose 50% Injectable 25 Gram(s) IV Push once  dextrose 50% Injectable 25 Gram(s) IV Push once  digoxin     Tablet 0.25 milliGRAM(s) Oral daily  docusate sodium 100 milliGRAM(s) Oral three times a day  guaiFENesin  milliGRAM(s) Oral every 12 hours  insulin glargine Injectable (LANTUS) 12 Unit(s) SubCutaneous at bedtime  insulin lispro (HumaLOG) corrective regimen sliding scale   SubCutaneous three times a day before meals  insulin lispro (HumaLOG) corrective regimen sliding scale   SubCutaneous at bedtime  insulin lispro Injectable (HumaLOG) 6 Unit(s) SubCutaneous two times a day with meals  insulin lispro Injectable (HumaLOG) 4 Unit(s) SubCutaneous with dinner  methylPREDNISolone 4 milliGRAM(s) Oral daily  metoclopramide 5 milliGRAM(s) Oral three times a day  montelukast 10 milliGRAM(s) Oral daily  pantoprazole    Tablet 40 milliGRAM(s) Oral before breakfast  polyethylene glycol 3350 17 Gram(s) Oral every 12 hours  senna 2 Tablet(s) Oral at bedtime  tiotropium 18 MICROgram(s) Capsule 1 Capsule(s) Inhalation daily    MEDICATIONS  (PRN):  dextrose 40% Gel 15 Gram(s) Oral once PRN Blood Glucose LESS THAN 70 milliGRAM(s)/deciliter  diphenhydrAMINE   Capsule 25 milliGRAM(s) Oral every 8 hours PRN Rash and/or Itching  glucagon  Injectable 1 milliGRAM(s) IntraMuscular once PRN Glucose LESS THAN 70 milligrams/deciliter  morphine  - Injectable 1 milliGRAM(s) IV Push daily PRN wound vac/ dressing changes  ondansetron Injectable 4 milliGRAM(s) IV Push every 6 hours PRN Nausea and/or Vomiting  simethicone 160 milliGRAM(s) Chew four times a day PRN Gas  traMADol 25 milliGRAM(s) Oral every 4 hours PRN Severe Pain (7 - 10)      Vital Signs Last 24 Hrs  T(C): 36.9 (22 Aug 2018 12:30), Max: 36.9 (22 Aug 2018 12:30)  T(F): 98.5 (22 Aug 2018 12:30), Max: 98.5 (22 Aug 2018 12:30)  HR: 84 (22 Aug 2018 12:30) (81 - 84)  BP: 104/68 (22 Aug 2018 12:30) (104/68 - 117/71)  BP(mean): --  RR: 18 (22 Aug 2018 12:30) (18 - 20)  SpO2: 98% (22 Aug 2018 12:30) (94% - 98%)  CAPILLARY BLOOD GLUCOSE      POCT Blood Glucose.: 286 mg/dL (22 Aug 2018 12:14)  POCT Blood Glucose.: 224 mg/dL (22 Aug 2018 07:47)  POCT Blood Glucose.: 189 mg/dL (21 Aug 2018 21:27)  POCT Blood Glucose.: 117 mg/dL (21 Aug 2018 16:46)    I&O's Summary    21 Aug 2018 07:01  -  22 Aug 2018 07:00  --------------------------------------------------------  IN: 1240 mL / OUT: 2300 mL / NET: -1060 mL    22 Aug 2018 07:01  -  22 Aug 2018 14:24  --------------------------------------------------------  IN: 960 mL / OUT: 751 mL / NET: 209 mL          PHYSICAL EXAM:  GENERAL: NAD, well-developed  HEAD: Atraumatic, Normocephalic  EYES: Conjunctiva and sclera clear  NECK: Supple  CHEST/LUNG: Scattered wheeze bilaterally.   HEART: Regular rate and rhythm; No murmurs, rubs, or gallops  ABDOMEN: Soft, Nontender, Nondistended; Bowel sounds present; ostomy present.   EXTREMITIES: No clubbing, cyanosis, or edema  PSYCH/Neuro: AAOx3. Non-focal.       LABS:                        10.0   6.72  )-----------( 395      ( 21 Aug 2018 07:51 )             32.5     08-21    139  |  99  |  6<L>  ----------------------------<  122<H>  4.2   |  28  |  0.59    Ca    9.2      21 Aug 2018 07:17          RADIOLOGY & ADDITIONAL TESTS:    Imaging Personally Reviewed:   Consultant(s) Notes Reviewed:  ID, pulmonary, CRS  Care Discussed with Consultants/Other Providers: Dr. Esteban

## 2018-08-22 NOTE — PROGRESS NOTE ADULT - ASSESSMENT
Assessment  67F s/p APR on 7/24 for rectal cancer, discharged to rehab, now admitted for pneumonia and UTI.    - Continue bowel regimen (senna, colace, miralax)  - Care of UTI and pneumonia per medicine and pulmonary  - Continue wound vac upon discharge. Please confirm with accepting rehab facility that staff is trained to change wound vac 3x per week    Scott Sellers, PGY-2  [facility] [phone]

## 2018-08-22 NOTE — PROGRESS NOTE ADULT - PROBLEM SELECTOR PLAN 2
-There was yeast in UA.  -Urine culture sent, not showing candida, fluconazole DC'ed.   -UCx positive for E. faecalis, sensitive to ampicillin and vanco. Abx here now completed.   -ID recs appreciated.

## 2018-08-22 NOTE — PROGRESS NOTE ADULT - SUBJECTIVE AND OBJECTIVE BOX
Surgery Progress Note    S: Patient seen and examined. No acute events overnight. Patient continuing to have GI function out of her ostomy. Feels well.    O:  Vital Signs Last 24 Hrs  T(C): 36.7 (22 Aug 2018 05:11), Max: 37.1 (21 Aug 2018 11:36)  T(F): 98.1 (22 Aug 2018 05:11), Max: 98.7 (21 Aug 2018 11:36)  HR: 83 (22 Aug 2018 05:11) (81 - 88)  BP: 117/71 (22 Aug 2018 05:11) (107/67 - 117/71)  BP(mean): --  RR: 18 (22 Aug 2018 05:11) (18 - 18)  SpO2: 96% (22 Aug 2018 05:11) (94% - 96%)    I&O's Detail    21 Aug 2018 07:01  -  22 Aug 2018 07:00  --------------------------------------------------------  IN:    Oral Fluid: 1240 mL  Total IN: 1240 mL    OUT:    Voided: 2300 mL  Total OUT: 2300 mL    Total NET: -1060 mL          MEDICATIONS  (STANDING):  acetaminophen   Tablet. 1000 milliGRAM(s) Oral every 8 hours  ALBUTerol/ipratropium for Nebulization 3 milliLiter(s) Nebulizer every 6 hours  apixaban 5 milliGRAM(s) Oral every 12 hours  buDESOnide 160 MICROgram(s)/formoterol 4.5 MICROgram(s) Inhaler 2 Puff(s) Inhalation two times a day  cefepime   IVPB 2000 milliGRAM(s) IV Intermittent every 8 hours  chlorhexidine 4% Liquid 1 Application(s) Topical <User Schedule>  dextrose 5%. 1000 milliLiter(s) (50 mL/Hr) IV Continuous <Continuous>  dextrose 50% Injectable 12.5 Gram(s) IV Push once  dextrose 50% Injectable 25 Gram(s) IV Push once  dextrose 50% Injectable 25 Gram(s) IV Push once  digoxin     Tablet 0.25 milliGRAM(s) Oral daily  docusate sodium 100 milliGRAM(s) Oral three times a day  guaiFENesin  milliGRAM(s) Oral every 12 hours  insulin glargine Injectable (LANTUS) 12 Unit(s) SubCutaneous at bedtime  insulin lispro (HumaLOG) corrective regimen sliding scale   SubCutaneous three times a day before meals  insulin lispro (HumaLOG) corrective regimen sliding scale   SubCutaneous at bedtime  insulin lispro Injectable (HumaLOG) 6 Unit(s) SubCutaneous two times a day with meals  insulin lispro Injectable (HumaLOG) 4 Unit(s) SubCutaneous with dinner  methylPREDNISolone 4 milliGRAM(s) Oral daily  metoclopramide 5 milliGRAM(s) Oral three times a day  montelukast 10 milliGRAM(s) Oral daily  pantoprazole    Tablet 40 milliGRAM(s) Oral before breakfast  polyethylene glycol 3350 17 Gram(s) Oral every 12 hours  senna 2 Tablet(s) Oral at bedtime  vancomycin  IVPB 1250 milliGRAM(s) IV Intermittent every 12 hours    MEDICATIONS  (PRN):  dextrose 40% Gel 15 Gram(s) Oral once PRN Blood Glucose LESS THAN 70 milliGRAM(s)/deciliter  diphenhydrAMINE   Capsule 25 milliGRAM(s) Oral every 8 hours PRN Rash and/or Itching  glucagon  Injectable 1 milliGRAM(s) IntraMuscular once PRN Glucose LESS THAN 70 milligrams/deciliter  morphine  - Injectable 1 milliGRAM(s) IV Push daily PRN wound vac/ dressing changes  ondansetron Injectable 4 milliGRAM(s) IV Push every 6 hours PRN Nausea and/or Vomiting  simethicone 160 milliGRAM(s) Chew four times a day PRN Gas  traMADol 25 milliGRAM(s) Oral every 4 hours PRN Severe Pain (7 - 10)                            10.0   6.72  )-----------( 395      ( 21 Aug 2018 07:51 )             32.5       08-21    139  |  99  |  6<L>  ----------------------------<  122<H>  4.2   |  28  |  0.59    Ca    9.2      21 Aug 2018 07:17        Physical Exam:  Gen: Laying in bed, NAD  Resp: Unlabored breathing  Abd: soft, NTND. Well-healed surgical scar. Ostomy pink/viable with gas and stool in bag.  Ext: WWP  Skin: No rashes

## 2018-08-22 NOTE — PROGRESS NOTE ADULT - SUBJECTIVE AND OBJECTIVE BOX
CHIEF COMPLAINT: f/up for PNA, asthma, TBM, allergies--better-ambulating, minimal cough    Interval Events: ambujlating--? D8 ABX    REVIEW OF SYSTEMS:  Constitutional: No fevers or chills. No weight loss. No fatigue or generalized malaise.  Eyes: No itching or discharge from the eyes  ENT: No ear pain. No ear discharge. No nasal congestion. No post nasal drip. No epistaxis. No throat pain. No sore throat. No difficulty swallowing.   CV: No chest pain. No palpitations. No lightheadedness or dizziness.   Resp: No dyspnea at rest. No dyspnea on exertion. No orthopnea. No wheezing. No cough. No stridor. + sputum production. No chest pain with respiration.  GI: No nausea. No vomiting. No diarrhea.  MSK: No joint pain or pain in any extremities  Integumentary: No skin lesions. No pedal edema.  Neurological: No gross motor weakness. No sensory changes.  +[ ] All other systems negative  [ ] Unable to assess ROS because ________    OBJECTIVE:  ICU Vital Signs Last 24 Hrs  T(C): 36.7 (22 Aug 2018 05:11), Max: 37.1 (21 Aug 2018 11:36)  T(F): 98.1 (22 Aug 2018 05:11), Max: 98.7 (21 Aug 2018 11:36)  HR: 83 (22 Aug 2018 05:11) (81 - 88)  BP: 117/71 (22 Aug 2018 05:11) (107/67 - 117/71)  BP(mean): --  ABP: --  ABP(mean): --  RR: 18 (22 Aug 2018 05:11) (18 - 18)  SpO2: 96% (22 Aug 2018 05:11) (94% - 96%)        08-20 @ 07:01  -  08-21 @ 07:00  --------------------------------------------------------  IN: 1320 mL / OUT: 2625 mL / NET: -1305 mL    08-21 @ 07:01  -  08-22 @ 05:23  --------------------------------------------------------  IN: 1120 mL / OUT: 1900 mL / NET: -780 mL      CAPILLARY BLOOD GLUCOSE      POCT Blood Glucose.: 189 mg/dL (21 Aug 2018 21:27)      PHYSICAL EXAM: NAD in bed  General: Awake, alert, oriented X 3.   HEENT: Atraumatic, normocephalic.                 Mallampatti Grade 2                No nasal congestion.                No tonsillar or pharyngeal exudates.  Lymph Nodes: No palpable lymphadenopathy  Neck: No JVD. No carotid bruit.   Respiratory: Normal chest expansion                         Normal percussion                         Normal and equal air entry                         No wheeze, rhonchi or rales.  Cardiovascular: S1 S2 normal. + murmurs, rubs or gallops.   Abdomen: Soft, non-tender, non-distended. No organomegaly. + BS  Extremities: Warm to touch. Peripheral pulse palpable. No pedal edema.   Skin: No rashes or skin lesions  Neurological: Motor and sensory examination equal and normal in all four extremities.  Psychiatry: Appropriate mood and affect.    HOSPITAL MEDICATIONS:  MEDICATIONS  (STANDING):  acetaminophen   Tablet. 1000 milliGRAM(s) Oral every 8 hours  ALBUTerol/ipratropium for Nebulization 3 milliLiter(s) Nebulizer every 6 hours  apixaban 5 milliGRAM(s) Oral every 12 hours  buDESOnide 160 MICROgram(s)/formoterol 4.5 MICROgram(s) Inhaler 2 Puff(s) Inhalation two times a day  cefepime   IVPB 2000 milliGRAM(s) IV Intermittent every 8 hours  chlorhexidine 4% Liquid 1 Application(s) Topical <User Schedule>  dextrose 5%. 1000 milliLiter(s) (50 mL/Hr) IV Continuous <Continuous>  dextrose 50% Injectable 12.5 Gram(s) IV Push once  dextrose 50% Injectable 25 Gram(s) IV Push once  dextrose 50% Injectable 25 Gram(s) IV Push once  digoxin     Tablet 0.25 milliGRAM(s) Oral daily  docusate sodium 100 milliGRAM(s) Oral three times a day  guaiFENesin  milliGRAM(s) Oral every 12 hours  insulin glargine Injectable (LANTUS) 12 Unit(s) SubCutaneous at bedtime  insulin lispro (HumaLOG) corrective regimen sliding scale   SubCutaneous three times a day before meals  insulin lispro (HumaLOG) corrective regimen sliding scale   SubCutaneous at bedtime  insulin lispro Injectable (HumaLOG) 6 Unit(s) SubCutaneous two times a day with meals  insulin lispro Injectable (HumaLOG) 4 Unit(s) SubCutaneous with dinner  methylPREDNISolone 4 milliGRAM(s) Oral daily  metoclopramide 5 milliGRAM(s) Oral three times a day  montelukast 10 milliGRAM(s) Oral daily  pantoprazole    Tablet 40 milliGRAM(s) Oral before breakfast  polyethylene glycol 3350 17 Gram(s) Oral every 12 hours  senna 2 Tablet(s) Oral at bedtime  vancomycin  IVPB 1250 milliGRAM(s) IV Intermittent every 12 hours    MEDICATIONS  (PRN):  dextrose 40% Gel 15 Gram(s) Oral once PRN Blood Glucose LESS THAN 70 milliGRAM(s)/deciliter  diphenhydrAMINE   Capsule 25 milliGRAM(s) Oral every 8 hours PRN Rash and/or Itching  glucagon  Injectable 1 milliGRAM(s) IntraMuscular once PRN Glucose LESS THAN 70 milligrams/deciliter  morphine  - Injectable 1 milliGRAM(s) IV Push daily PRN wound vac/ dressing changes  ondansetron Injectable 4 milliGRAM(s) IV Push every 6 hours PRN Nausea and/or Vomiting  simethicone 160 milliGRAM(s) Chew four times a day PRN Gas  traMADol 25 milliGRAM(s) Oral every 4 hours PRN Severe Pain (7 - 10)      LABS:                        10.0   6.72  )-----------( 395      ( 21 Aug 2018 07:51 )             32.5     08-21    139  |  99  |  6<L>  ----------------------------<  122<H>  4.2   |  28  |  0.59    Ca    9.2      21 Aug 2018 07:17                MICROBIOLOGY:     RADIOLOGY:  [ ] Reviewed and interpreted by me    Point of Care Ultrasound Findings:    PFT:    EKG:

## 2018-08-22 NOTE — PROGRESS NOTE ADULT - ATTENDING COMMENTS
as above--  asthma, TBM, chronic bronchitis---CT c/w new PNA vs UTI (? DIEUDONNE)  F/up all cx--re send sputum for C and S and AFB x3 days; continue cefepime/vanco (covers pseudomonas)--ID luis (Afshan Stout et al)-diflucan for yeast in urine D8 of abx--? completed  [id number]-on AC  PT needed     DC pending    Eulalio Allison MD-Pulmonary   [phone]

## 2018-08-22 NOTE — PROGRESS NOTE ADULT - ATTENDING COMMENTS
Niko Bhatia MD  Division of [facility] Medicine  Cell: [phone]  Pager: [phone]  Office: [phone]

## 2018-08-22 NOTE — PROGRESS NOTE ADULT - PROBLEM SELECTOR PLAN 10
-Diet: C/w soft diabetic diet.   -VTE PPx: On Eliquis 5mg BID.  -PT eval recs [facility]. Wound vac in place.    11. Dispo: -Plan for tentative DC to [facility] tomorrow if patient remains stable/improved.

## 2018-08-22 NOTE — PROGRESS NOTE ADULT - SUBJECTIVE AND OBJECTIVE BOX
Infectious Diseases progress note:    Subjective:  No new fevers.  Cough has improved, no purulent sputum.  No dysuria.  States decreased ostomy output today.  On regular diet    ROS:  CONSTITUTIONAL:  No fever, chills, rigors  CARDIOVASCULAR:  No chest pain or palpitations  RESPIRATORY:   No SOB, cough, dyspnea on exertion.  No wheezing  GASTROINTESTINAL:  No abd pain, N/V, diarrhea/constipation  EXTREMITIES:  No swelling or joint pain  GENITOURINARY:  No burning on urination, increased frequency or urgency.  No flank pain  NEUROLOGIC:  No HA, visual disturbances  SKIN: No rashes    Allergies    ampicillin (Short breath)  aspirin (Short breath)  Avelox (Short breath; Pruritus)  codeine (Short breath)  Dilaudid (Short breath)  iodine (Short breath; Swelling)  penicillin (Short breath)  shellfish (Anaphylaxis)  tetanus toxoid (Short breath)  Valium (Short breath)    Intolerances        ANTIBIOTICS/RELEVANT:  antimicrobials    immunologic:    OTHER:  acetaminophen   Tablet. 1000 milliGRAM(s) Oral every 8 hours  ALBUTerol/ipratropium for Nebulization 3 milliLiter(s) Nebulizer every 6 hours  apixaban 5 milliGRAM(s) Oral every 12 hours  buDESOnide 160 MICROgram(s)/formoterol 4.5 MICROgram(s) Inhaler 2 Puff(s) Inhalation two times a day  chlorhexidine 4% Liquid 1 Application(s) Topical <User Schedule>  dextrose 40% Gel 15 Gram(s) Oral once PRN  dextrose 5%. 1000 milliLiter(s) IV Continuous <Continuous>  dextrose 50% Injectable 12.5 Gram(s) IV Push once  dextrose 50% Injectable 25 Gram(s) IV Push once  dextrose 50% Injectable 25 Gram(s) IV Push once  digoxin     Tablet 0.25 milliGRAM(s) Oral daily  diphenhydrAMINE   Capsule 25 milliGRAM(s) Oral every 8 hours PRN  docusate sodium 100 milliGRAM(s) Oral three times a day  glucagon  Injectable 1 milliGRAM(s) IntraMuscular once PRN  guaiFENesin  milliGRAM(s) Oral every 12 hours  insulin glargine Injectable (LANTUS) 12 Unit(s) SubCutaneous at bedtime  insulin lispro (HumaLOG) corrective regimen sliding scale   SubCutaneous three times a day before meals  insulin lispro (HumaLOG) corrective regimen sliding scale   SubCutaneous at bedtime  insulin lispro Injectable (HumaLOG) 6 Unit(s) SubCutaneous two times a day with meals  insulin lispro Injectable (HumaLOG) 4 Unit(s) SubCutaneous with dinner  methylPREDNISolone 4 milliGRAM(s) Oral daily  metoclopramide 5 milliGRAM(s) Oral three times a day  montelukast 10 milliGRAM(s) Oral daily  morphine  - Injectable 1 milliGRAM(s) IV Push daily PRN  ondansetron Injectable 4 milliGRAM(s) IV Push every 6 hours PRN  pantoprazole    Tablet 40 milliGRAM(s) Oral before breakfast  polyethylene glycol 3350 17 Gram(s) Oral every 12 hours  senna 2 Tablet(s) Oral at bedtime  simethicone 160 milliGRAM(s) Chew four times a day PRN  tiotropium 18 MICROgram(s) Capsule 1 Capsule(s) Inhalation daily  traMADol 25 milliGRAM(s) Oral every 4 hours PRN      Objective:  Vital Signs Last 24 Hrs  T(C): 36.9 (22 Aug 2018 12:30), Max: 36.9 (22 Aug 2018 12:30)  T(F): 98.5 (22 Aug 2018 12:30), Max: 98.5 (22 Aug 2018 12:30)  HR: 84 (22 Aug 2018 12:30) (81 - 84)  BP: 104/68 (22 Aug 2018 12:30) (104/68 - 117/71)  BP(mean): --  RR: 18 (22 Aug 2018 12:30) (18 - 20)  SpO2: 98% (22 Aug 2018 12:30) (94% - 98%)    PHYSICAL EXAM:  Constitutional:NAD  Eyes:EBER, EOMI  Ear/Nose/Throat: no thrush, mucositis.  Moist mucous membranes	  Neck:no JVD, no lymphadenopathy, supple  Respiratory: CTA barry  Cardiovascular: S1S2 RRR, no murmurs  Gastrointestinal:soft, nontender,  nondistended (+) BS, ostomy with small amount watery brown stool  Extremities:no e/e/c  Skin:  no rashes, open wounds or ulcerations        LABS:                        10.0   6.72  )-----------( 395      ( 21 Aug 2018 07:51 )             32.5     08-21    139  |  99  |  6<L>  ----------------------------<  122<H>  4.2   |  28  |  0.59    Ca    9.2      21 Aug 2018 07:17                  Vancomycin Level, Trough: 17.9 ug/mL (08-22 @ 09:31)  Vancomycin Level, Trough: 15.9 ug/mL (08-20 @ 10:14)  Vancomycin Level, Trough: 12.1 ug/mL (08-18 @ 20:21)  Vancomycin Level, Trough: 7.0 ug/mL (08-17 @ 22:06)      Rapid RVP Result: [facility]          MICROBIOLOGY:    Culture - Sputum . (08.21.18 @ 09:47)    Gram Stain:   Numerous polymorphonuclear leukocytes per low power field  Few Squamous epithelial cells per low power field  Few Yeast like cells per oil power field  Rare Gram Positive Rods per oil power field    Specimen Source: .Sputum Sputum    Culture Results:   Normal Respiratory Jessica present    Culture - Acid Fast - Sputum w/Smear . (08.18.18 @ 16:59)    Specimen Source: .Sputum Sputum    Acid Fast Bacilli Smear:   No acid fast bacilli seen by fluorochrome stain    Culture - Acid Fast - Sputum w/Smear . (08.17.18 @ 00:38)    Specimen Source: .Sputum Sputum    Acid Fast Bacilli Smear:   No acid fast bacilli seen by fluorochrome stain          RADIOLOGY & ADDITIONAL STUDIES:

## 2018-08-22 NOTE — PROGRESS NOTE ADULT - PROBLEM SELECTOR PLAN 1
?PNA (may represent DIEUDONNE) vs. UTI--check all cx  D8 cefepime/vanco (p/t)  Diflucan for yeast urine

## 2018-08-22 NOTE — PROGRESS NOTE ADULT - ASSESSMENT
The patient is a 66 yo woman with PMH of COPD, ashtma on chronic steroids, tracheobronchomalacia s/p bronchial thermoplasty (10/16), Afib on Eliquis, HTN, adrenal insufficiency, T2DM, DVT, SCC anus (dx 2016, s/p chemo rad s/p APR 7/24/18) presenting from rehabiliation with fevers. The patient stated she felt subjective fevers yesterday to T max 100.2 this morning, she was informed given she is on steroids T > 99 would be fever, prompting her to come to the ED. She is denying any chills URI symptoms of rhinorrhea, sore throat, dysphagia. She has cough that is sometimes productive with greenish phlegm however she has had it for several years. She feels increased SOB non-exertional from decreased inspiratory effort. During patient's previous admission end of July 2018 she developed fevers with positive urine culture. She was treated with 7 day course of ceftriaxone. She is reporting continued dysuria, increased frequency, incontinence. The patient ostomy has not emptied since she was in rehabilitation, she is citing functioing during previous admission prior to rehabilitation. She is denying any sick contacts.    Pt states she has an open draining wound in the perineum, for which she had a wound vac that is now off.  Drainage is malodorous.     Problem/Plan - 1:    ·	HCAP/aspiration pna    - CT chest with multifocal R sided pna.   Agree with broad spectrum abx coverage with vanco/cefepime given recent hospitalization.  Recent sputum cx grew pseudomonas.  Complete 7 day course through 8/21.  OK to d/c abx, pt clinically improving    - Check repeat sputum cx.  Legionella was negative    - Blood cultures NGTD    - r/o DIEUDONNE.  Sputum AFB negative thus far    Problem/Plan - 2:    ·	UTI    - (+) UA with urinary symptoms.  Budding yeast seen on UA, urine cultures negative for candida, diflucan d/c'd.  Urine cultures with E.faecalis, sensitive to vanco/ampicillin.  cont present abx.  clinically improving        Problem/Plan - 3:    ·	Open perineal wound    - Pt with recent anal SCC and resection.  s/p wound care evaluation for wound vac/topical care recommendations    - Colorectal surgery f/u    - Cont bowel regimen, monitor stool output    Will follow    Joselyn Esteban  [phone]

## 2018-08-22 NOTE — PROGRESS NOTE ADULT - PROBLEM SELECTOR PLAN 5
-S/p mitomycin/xeloda and RT 5/17 to 6/17. PET 1/18 showed hypermetabolism in anal area with rad onc concerned for local relapse/failure proved by biopsy 2/18  -Underwent APR with Dr. Terrell Luong 7/24/18 with permanent end colostomy  -Purulence around wound, however it was draining what appeared to have been mucus.  -CRS recs appreciated, PT wound care placed wound vac.   -C/w miralax, senna, and colace for constipation.   -Outpatient f/u with Dr. King.  -Ostomy care.   -Pain control PRN.   -C/w soft diet, so far tolerating.   -Patient reports some queasiness/nausea, so will c/w Reglan 5mg PO TID before meals for now. EKG Qtc 415. Will repeat EKG today. So far the Reglan is helping per patient.   -C/w zofran PRN.

## 2018-08-22 NOTE — PROGRESS NOTE ADULT - PROBLEM SELECTOR PLAN 6
-C/w senna, colace, and miralax.   -AXR from 8/16 showed ileus  -S/p Enema through ostomy done by CRS with successful increase in stool output.  -C/w soft diet, so far tolerating.   -Stool counts and monitor ostomy output.

## 2018-08-23 VITALS
OXYGEN SATURATION: 96 % | TEMPERATURE: 98 F | SYSTOLIC BLOOD PRESSURE: 114 MMHG | HEART RATE: 84 BPM | DIASTOLIC BLOOD PRESSURE: 69 MMHG | RESPIRATION RATE: 18 BRPM

## 2018-08-23 LAB
ANION GAP SERPL CALC-SCNC: 12 MMOL/L — SIGNIFICANT CHANGE UP (ref 5–17)
BUN SERPL-MCNC: 8 MG/DL — SIGNIFICANT CHANGE UP (ref 7–23)
CALCIUM SERPL-MCNC: 9 MG/DL — SIGNIFICANT CHANGE UP (ref 8.4–10.5)
CHLORIDE SERPL-SCNC: 100 MMOL/L — SIGNIFICANT CHANGE UP (ref 96–108)
CO2 SERPL-SCNC: 27 MMOL/L — SIGNIFICANT CHANGE UP (ref 22–31)
CREAT SERPL-MCNC: 0.56 MG/DL — SIGNIFICANT CHANGE UP (ref 0.5–1.3)
CULTURE RESULTS: SIGNIFICANT CHANGE UP
GLUCOSE BLDC GLUCOMTR-MCNC: 183 MG/DL — HIGH (ref 70–99)
GLUCOSE BLDC GLUCOMTR-MCNC: 258 MG/DL — HIGH (ref 70–99)
GLUCOSE SERPL-MCNC: 144 MG/DL — HIGH (ref 70–99)
HCT VFR BLD CALC: 32.1 % — LOW (ref 34.5–45)
HGB BLD-MCNC: 9.8 G/DL — LOW (ref 11.5–15.5)
MAGNESIUM SERPL-MCNC: 1.7 MG/DL — SIGNIFICANT CHANGE UP (ref 1.6–2.6)
MCHC RBC-ENTMCNC: 22.8 PG — LOW (ref 27–34)
MCHC RBC-ENTMCNC: 30.5 GM/DL — LOW (ref 32–36)
MCV RBC AUTO: 74.8 FL — LOW (ref 80–100)
PHOSPHATE SERPL-MCNC: 3.7 MG/DL — SIGNIFICANT CHANGE UP (ref 2.5–4.5)
PLATELET # BLD AUTO: 425 K/UL — HIGH (ref 150–400)
POTASSIUM SERPL-MCNC: 4 MMOL/L — SIGNIFICANT CHANGE UP (ref 3.5–5.3)
POTASSIUM SERPL-SCNC: 4 MMOL/L — SIGNIFICANT CHANGE UP (ref 3.5–5.3)
RBC # BLD: 4.29 M/UL — SIGNIFICANT CHANGE UP (ref 3.8–5.2)
RBC # FLD: 16.3 % — HIGH (ref 10.3–14.5)
SODIUM SERPL-SCNC: 139 MMOL/L — SIGNIFICANT CHANGE UP (ref 135–145)
SPECIMEN SOURCE: SIGNIFICANT CHANGE UP
WBC # BLD: 8.03 K/UL — SIGNIFICANT CHANGE UP (ref 3.8–10.5)
WBC # FLD AUTO: 8.03 K/UL — SIGNIFICANT CHANGE UP (ref 3.8–10.5)

## 2018-08-23 PROCEDURE — 85730 THROMBOPLASTIN TIME PARTIAL: CPT

## 2018-08-23 PROCEDURE — 82962 GLUCOSE BLOOD TEST: CPT

## 2018-08-23 PROCEDURE — 71045 X-RAY EXAM CHEST 1 VIEW: CPT

## 2018-08-23 PROCEDURE — 96374 THER/PROPH/DIAG INJ IV PUSH: CPT

## 2018-08-23 PROCEDURE — 85610 PROTHROMBIN TIME: CPT

## 2018-08-23 PROCEDURE — 74176 CT ABD & PELVIS W/O CONTRAST: CPT

## 2018-08-23 PROCEDURE — 80202 ASSAY OF VANCOMYCIN: CPT

## 2018-08-23 PROCEDURE — 87186 SC STD MICRODIL/AGAR DIL: CPT

## 2018-08-23 PROCEDURE — 87798 DETECT AGENT NOS DNA AMP: CPT

## 2018-08-23 PROCEDURE — 87581 M.PNEUMON DNA AMP PROBE: CPT

## 2018-08-23 PROCEDURE — 80048 BASIC METABOLIC PNL TOTAL CA: CPT

## 2018-08-23 PROCEDURE — 81001 URINALYSIS AUTO W/SCOPE: CPT

## 2018-08-23 PROCEDURE — 99239 HOSP IP/OBS DSCHRG MGMT >30: CPT

## 2018-08-23 PROCEDURE — 80053 COMPREHEN METABOLIC PANEL: CPT

## 2018-08-23 PROCEDURE — 85027 COMPLETE CBC AUTOMATED: CPT

## 2018-08-23 PROCEDURE — 87070 CULTURE OTHR SPECIMN AEROBIC: CPT

## 2018-08-23 PROCEDURE — 87040 BLOOD CULTURE FOR BACTERIA: CPT

## 2018-08-23 PROCEDURE — 74018 RADEX ABDOMEN 1 VIEW: CPT

## 2018-08-23 PROCEDURE — 87086 URINE CULTURE/COLONY COUNT: CPT

## 2018-08-23 PROCEDURE — 83735 ASSAY OF MAGNESIUM: CPT

## 2018-08-23 PROCEDURE — 87116 MYCOBACTERIA CULTURE: CPT

## 2018-08-23 PROCEDURE — 71250 CT THORAX DX C-: CPT

## 2018-08-23 PROCEDURE — 87633 RESP VIRUS 12-25 TARGETS: CPT

## 2018-08-23 PROCEDURE — 87015 SPECIMEN INFECT AGNT CONCNTJ: CPT

## 2018-08-23 PROCEDURE — 97530 THERAPEUTIC ACTIVITIES: CPT

## 2018-08-23 PROCEDURE — 83036 HEMOGLOBIN GLYCOSYLATED A1C: CPT

## 2018-08-23 PROCEDURE — 94640 AIRWAY INHALATION TREATMENT: CPT

## 2018-08-23 PROCEDURE — 99232 SBSQ HOSP IP/OBS MODERATE 35: CPT

## 2018-08-23 PROCEDURE — 97605 NEG PRS WND THER DME<=50SQCM: CPT

## 2018-08-23 PROCEDURE — 97116 GAIT TRAINING THERAPY: CPT

## 2018-08-23 PROCEDURE — 84100 ASSAY OF PHOSPHORUS: CPT

## 2018-08-23 PROCEDURE — 99285 EMERGENCY DEPT VISIT HI MDM: CPT | Mod: 25

## 2018-08-23 PROCEDURE — 87486 CHLMYD PNEUM DNA AMP PROBE: CPT

## 2018-08-23 PROCEDURE — 93005 ELECTROCARDIOGRAM TRACING: CPT

## 2018-08-23 PROCEDURE — 87206 SMEAR FLUORESCENT/ACID STAI: CPT

## 2018-08-23 PROCEDURE — 97162 PT EVAL MOD COMPLEX 30 MIN: CPT

## 2018-08-23 PROCEDURE — 87449 NOS EACH ORGANISM AG IA: CPT

## 2018-08-23 RX ORDER — DOCUSATE SODIUM 100 MG
1 CAPSULE ORAL
Qty: 0 | Refills: 0 | DISCHARGE
Start: 2018-08-23

## 2018-08-23 RX ORDER — OMALIZUMAB 150 MG/ML
0 INJECTION, SOLUTION SUBCUTANEOUS
Qty: 0 | Refills: 0 | COMMUNITY

## 2018-08-23 RX ORDER — INSULIN LISPRO 100/ML
1 VIAL (ML) SUBCUTANEOUS
Qty: 1 | Refills: 0 | OUTPATIENT
Start: 2018-08-23 | End: 2018-09-21

## 2018-08-23 RX ORDER — LIRAGLUTIDE 6 MG/ML
1.8 INJECTION SUBCUTANEOUS
Qty: 0 | Refills: 0 | COMMUNITY

## 2018-08-23 RX ORDER — METOCLOPRAMIDE HCL 10 MG
1 TABLET ORAL
Qty: 0 | Refills: 0 | COMMUNITY
Start: 2018-08-23

## 2018-08-23 RX ORDER — TRAMADOL HYDROCHLORIDE 50 MG/1
0.5 TABLET ORAL
Qty: 0 | Refills: 0 | DISCHARGE
Start: 2018-08-23

## 2018-08-23 RX ORDER — DIPHENHYDRAMINE HCL 50 MG
1 CAPSULE ORAL
Qty: 0 | Refills: 0 | COMMUNITY
Start: 2018-08-23

## 2018-08-23 RX ORDER — POLYETHYLENE GLYCOL 3350 17 G/17G
17 POWDER, FOR SOLUTION ORAL
Qty: 0 | Refills: 0 | DISCHARGE
Start: 2018-08-23

## 2018-08-23 RX ORDER — BUDESONIDE AND FORMOTEROL FUMARATE DIHYDRATE 160; 4.5 UG/1; UG/1
2 AEROSOL RESPIRATORY (INHALATION)
Qty: 0 | Refills: 0 | DISCHARGE
Start: 2018-08-23

## 2018-08-23 RX ORDER — INSULIN GLARGINE 100 [IU]/ML
12 INJECTION, SOLUTION SUBCUTANEOUS
Qty: 1 | Refills: 0 | OUTPATIENT
Start: 2018-08-23 | End: 2018-09-21

## 2018-08-23 RX ORDER — ENOXAPARIN SODIUM 100 MG/ML
12 INJECTION SUBCUTANEOUS
Qty: 0 | Refills: 0 | COMMUNITY

## 2018-08-23 RX ORDER — FLUTICASONE FUROATE AND VILANTEROL TRIFENATATE 100; 25 UG/1; UG/1
1 POWDER RESPIRATORY (INHALATION)
Qty: 0 | Refills: 0 | COMMUNITY

## 2018-08-23 RX ORDER — SIMETHICONE 80 MG/1
2 TABLET, CHEWABLE ORAL
Qty: 0 | Refills: 0 | DISCHARGE
Start: 2018-08-23

## 2018-08-23 RX ORDER — INSULIN LISPRO 100/ML
5 VIAL (ML) SUBCUTANEOUS
Qty: 1 | Refills: 0 | OUTPATIENT
Start: 2018-08-23

## 2018-08-23 RX ORDER — HYDROCORTISONE 1 %
1 OINTMENT (GRAM) TOPICAL
Qty: 0 | Refills: 0 | COMMUNITY
Start: 2018-08-23

## 2018-08-23 RX ORDER — ASCORBIC ACID 60 MG
1 TABLET,CHEWABLE ORAL
Qty: 0 | Refills: 0 | COMMUNITY

## 2018-08-23 RX ORDER — INSULIN LISPRO 100/ML
0 VIAL (ML) SUBCUTANEOUS
Qty: 0 | Refills: 0 | COMMUNITY

## 2018-08-23 RX ORDER — INSULIN LISPRO 100/ML
4 VIAL (ML) SUBCUTANEOUS
Qty: 1 | Refills: 0 | OUTPATIENT
Start: 2018-08-23 | End: 2018-09-21

## 2018-08-23 RX ORDER — SENNA PLUS 8.6 MG/1
2 TABLET ORAL
Qty: 0 | Refills: 0 | DISCHARGE
Start: 2018-08-23

## 2018-08-23 RX ADMIN — TRAMADOL HYDROCHLORIDE 25 MILLIGRAM(S): 50 TABLET ORAL at 11:40

## 2018-08-23 RX ADMIN — TRAMADOL HYDROCHLORIDE 25 MILLIGRAM(S): 50 TABLET ORAL at 01:20

## 2018-08-23 RX ADMIN — TRAMADOL HYDROCHLORIDE 25 MILLIGRAM(S): 50 TABLET ORAL at 15:51

## 2018-08-23 RX ADMIN — PANTOPRAZOLE SODIUM 40 MILLIGRAM(S): 20 TABLET, DELAYED RELEASE ORAL at 05:30

## 2018-08-23 RX ADMIN — Medication 4 MILLIGRAM(S): at 05:29

## 2018-08-23 RX ADMIN — Medication 25 MILLIGRAM(S): at 15:50

## 2018-08-23 RX ADMIN — Medication 3 MILLILITER(S): at 08:11

## 2018-08-23 RX ADMIN — TRAMADOL HYDROCHLORIDE 25 MILLIGRAM(S): 50 TABLET ORAL at 00:51

## 2018-08-23 RX ADMIN — APIXABAN 5 MILLIGRAM(S): 2.5 TABLET, FILM COATED ORAL at 09:34

## 2018-08-23 RX ADMIN — Medication 1: at 08:10

## 2018-08-23 RX ADMIN — Medication 3: at 12:36

## 2018-08-23 RX ADMIN — TRAMADOL HYDROCHLORIDE 25 MILLIGRAM(S): 50 TABLET ORAL at 16:20

## 2018-08-23 RX ADMIN — Medication 5 MILLIGRAM(S): at 05:29

## 2018-08-23 RX ADMIN — TIOTROPIUM BROMIDE 1 CAPSULE(S): 18 CAPSULE ORAL; RESPIRATORY (INHALATION) at 12:38

## 2018-08-23 RX ADMIN — MONTELUKAST 10 MILLIGRAM(S): 4 TABLET, CHEWABLE ORAL at 12:38

## 2018-08-23 RX ADMIN — Medication 3 MILLILITER(S): at 12:38

## 2018-08-23 RX ADMIN — Medication 100 MILLIGRAM(S): at 05:30

## 2018-08-23 RX ADMIN — TRAMADOL HYDROCHLORIDE 25 MILLIGRAM(S): 50 TABLET ORAL at 11:20

## 2018-08-23 RX ADMIN — Medication 6 UNIT(S): at 08:10

## 2018-08-23 RX ADMIN — Medication 6 UNIT(S): at 12:36

## 2018-08-23 RX ADMIN — Medication 5 MILLIGRAM(S): at 12:37

## 2018-08-23 RX ADMIN — Medication 600 MILLIGRAM(S): at 05:30

## 2018-08-23 RX ADMIN — Medication 0.25 MILLIGRAM(S): at 05:30

## 2018-08-23 RX ADMIN — SIMETHICONE 160 MILLIGRAM(S): 80 TABLET, CHEWABLE ORAL at 04:09

## 2018-08-23 RX ADMIN — POLYETHYLENE GLYCOL 3350 17 GRAM(S): 17 POWDER, FOR SOLUTION ORAL at 09:39

## 2018-08-23 NOTE — PROGRESS NOTE ADULT - SUBJECTIVE AND OBJECTIVE BOX
Surgery Progress Note    S: Patient seen and examined. No acute events overnight. Patient reports her last ostomy output was yesterday evening. She feels that output has been reduced since she transitioned to regular food. However, she has not been taking her full bowel regimen recently due to increased liquid consistency of her ostomy output.    O:  Vital Signs Last 24 Hrs  T(C): 37.1 (22 Aug 2018 20:59), Max: 37.1 (22 Aug 2018 20:59)  T(F): 98.7 (22 Aug 2018 20:59), Max: 98.7 (22 Aug 2018 20:59)  HR: 93 (22 Aug 2018 20:59) (93 - 93)  BP: 117/73 (22 Aug 2018 20:59) (117/73 - 117/73)  BP(mean): --  RR: 18 (22 Aug 2018 20:59) (18 - 18)  SpO2: 95% (22 Aug 2018 20:59) (95% - 95%)    I&O's Detail    22 Aug 2018 07:01  -  23 Aug 2018 07:00  --------------------------------------------------------  IN:    Oral Fluid: 1200 mL  Total IN: 1200 mL    OUT:    Colostomy: 152 mL    Voided: 750 mL  Total OUT: 902 mL    Total NET: 298 mL          MEDICATIONS  (STANDING):  acetaminophen   Tablet. 1000 milliGRAM(s) Oral every 8 hours  ALBUTerol/ipratropium for Nebulization 3 milliLiter(s) Nebulizer every 6 hours  apixaban 5 milliGRAM(s) Oral every 12 hours  buDESOnide 160 MICROgram(s)/formoterol 4.5 MICROgram(s) Inhaler 2 Puff(s) Inhalation two times a day  chlorhexidine 4% Liquid 1 Application(s) Topical <User Schedule>  dextrose 5%. 1000 milliLiter(s) (50 mL/Hr) IV Continuous <Continuous>  dextrose 50% Injectable 12.5 Gram(s) IV Push once  dextrose 50% Injectable 25 Gram(s) IV Push once  dextrose 50% Injectable 25 Gram(s) IV Push once  digoxin     Tablet 0.25 milliGRAM(s) Oral daily  docusate sodium 100 milliGRAM(s) Oral three times a day  guaiFENesin  milliGRAM(s) Oral every 12 hours  insulin glargine Injectable (LANTUS) 12 Unit(s) SubCutaneous at bedtime  insulin lispro (HumaLOG) corrective regimen sliding scale   SubCutaneous three times a day before meals  insulin lispro (HumaLOG) corrective regimen sliding scale   SubCutaneous at bedtime  insulin lispro Injectable (HumaLOG) 6 Unit(s) SubCutaneous two times a day with meals  insulin lispro Injectable (HumaLOG) 4 Unit(s) SubCutaneous with dinner  methylPREDNISolone 4 milliGRAM(s) Oral daily  metoclopramide 5 milliGRAM(s) Oral three times a day  montelukast 10 milliGRAM(s) Oral daily  pantoprazole    Tablet 40 milliGRAM(s) Oral before breakfast  polyethylene glycol 3350 17 Gram(s) Oral every 12 hours  senna 2 Tablet(s) Oral at bedtime  tiotropium 18 MICROgram(s) Capsule 1 Capsule(s) Inhalation daily    MEDICATIONS  (PRN):  dextrose 40% Gel 15 Gram(s) Oral once PRN Blood Glucose LESS THAN 70 milliGRAM(s)/deciliter  diphenhydrAMINE   Capsule 25 milliGRAM(s) Oral every 8 hours PRN Rash and/or Itching  glucagon  Injectable 1 milliGRAM(s) IntraMuscular once PRN Glucose LESS THAN 70 milligrams/deciliter  hydrocortisone 2.5% Ointment 1 Application(s) Topical every 6 hours PRN Itching  morphine  - Injectable 1 milliGRAM(s) IV Push daily PRN wound vac/ dressing changes  ondansetron Injectable 4 milliGRAM(s) IV Push every 6 hours PRN Nausea and/or Vomiting  simethicone 160 milliGRAM(s) Chew four times a day PRN Gas  traMADol 25 milliGRAM(s) Oral every 4 hours PRN Severe Pain (7 - 10)                            9.8    8.03  )-----------( 425      ( 23 Aug 2018 07:44 )             32.1       08-23    139  |  100  |  8   ----------------------------<  144<H>  4.0   |  27  |  0.56    Ca    9.0      23 Aug 2018 06:22  Phos  3.7     08-23  Mg     1.7     08-23        Physical Exam:  Gen: Laying in bed, NAD  Resp: Unlabored breathing  Abd: soft, NTND. Well-healed surgical scar. Ostomy pink/viable with gas in bag.  Ext: WWP  Skin: No rashes

## 2018-08-23 NOTE — PROGRESS NOTE ADULT - PROBLEM SELECTOR PROBLEM 1
Fever
Pneumonia, bacterial

## 2018-08-23 NOTE — PROGRESS NOTE ADULT - ASSESSMENT
67F asthma/COPD, tracheomalacia s/p tracheobronchoplasty 10/2016, chronic cough with sputum production, Atrial fibrillation on Eliquis, Adrenal insufficiency on chronic steroids, DM2, HTN, squamous cell CA of anus s/p chemo/XRT and abdominoperineal resection now presenting with fevers and found to have UTI and pneumonia - incidentally noted to have minimal ostomy output    8/16-ID-cefepime/vanco/diflucan (yeast in urine)  8/20-improving-over all8/21-completing abx today, sputum neg x2 for afb  8/22-D8 of abx--?completed course 67F asthma/COPD, tracheomalacia s/p tracheobronchoplasty 10/2016, chronic cough with sputum production, Atrial fibrillation on Eliquis, Adrenal insufficiency on chronic steroids, DM2, HTN, squamous cell CA of anus s/p chemo/XRT and abdominoperineal resection now presenting with fevers and found to have UTI and pneumonia - incidentally noted to have minimal ostomy output    8/16-ID-cefepime/vanco/diflucan (yeast in urine)  8/20-improving-over all8/21-completing abx today, sputum neg x2 for afb  8/22-D8 of abx--+completed course

## 2018-08-23 NOTE — CHART NOTE - NSCHARTNOTESELECT_GEN_ALL_CORE
GI symptoms/AXR/Event Note
Event Note
Event Note/CRS recs
Infectious Disease
Nutrition Services

## 2018-08-23 NOTE — PROGRESS NOTE ADULT - PROBLEM SELECTOR PLAN 1
?PNA (may represent DIEUDONNE) vs. UTI--check all cx  D8 cefepime/vanco (p/t)  Diflucan for yeast urine ?PNA (may represent DIEUDONNE) vs. UTI--check all cx  D8 cefepime/vanco (p/t)-completed  Diflucan for yeast urine

## 2018-08-23 NOTE — PROGRESS NOTE ADULT - SUBJECTIVE AND OBJECTIVE BOX
Patient is a 67y old  Female who presents with a chief complaint of UTI (21 Aug 2018 15:32)        SUBJECTIVE / OVERNIGHT EVENTS: Patient reports she hasn't had stool output since last night. But she says her nausea and queasiness is better with the reglan. She denies abdominal discomfort. She reports mild coughing but overall feels better. She wants to go to rehab today.       MEDICATIONS  (STANDING):  acetaminophen   Tablet. 1000 milliGRAM(s) Oral every 8 hours  ALBUTerol/ipratropium for Nebulization 3 milliLiter(s) Nebulizer every 6 hours  apixaban 5 milliGRAM(s) Oral every 12 hours  buDESOnide 160 MICROgram(s)/formoterol 4.5 MICROgram(s) Inhaler 2 Puff(s) Inhalation two times a day  chlorhexidine 4% Liquid 1 Application(s) Topical <User Schedule>  dextrose 5%. 1000 milliLiter(s) (50 mL/Hr) IV Continuous <Continuous>  dextrose 50% Injectable 12.5 Gram(s) IV Push once  dextrose 50% Injectable 25 Gram(s) IV Push once  dextrose 50% Injectable 25 Gram(s) IV Push once  digoxin     Tablet 0.25 milliGRAM(s) Oral daily  docusate sodium 100 milliGRAM(s) Oral three times a day  guaiFENesin  milliGRAM(s) Oral every 12 hours  insulin glargine Injectable (LANTUS) 12 Unit(s) SubCutaneous at bedtime  insulin lispro (HumaLOG) corrective regimen sliding scale   SubCutaneous three times a day before meals  insulin lispro (HumaLOG) corrective regimen sliding scale   SubCutaneous at bedtime  insulin lispro Injectable (HumaLOG) 6 Unit(s) SubCutaneous two times a day with meals  insulin lispro Injectable (HumaLOG) 4 Unit(s) SubCutaneous with dinner  methylPREDNISolone 4 milliGRAM(s) Oral daily  metoclopramide 5 milliGRAM(s) Oral three times a day  montelukast 10 milliGRAM(s) Oral daily  pantoprazole    Tablet 40 milliGRAM(s) Oral before breakfast  polyethylene glycol 3350 17 Gram(s) Oral every 12 hours  senna 2 Tablet(s) Oral at bedtime  tiotropium 18 MICROgram(s) Capsule 1 Capsule(s) Inhalation daily    MEDICATIONS  (PRN):  dextrose 40% Gel 15 Gram(s) Oral once PRN Blood Glucose LESS THAN 70 milliGRAM(s)/deciliter  diphenhydrAMINE   Capsule 25 milliGRAM(s) Oral every 8 hours PRN Rash and/or Itching  glucagon  Injectable 1 milliGRAM(s) IntraMuscular once PRN Glucose LESS THAN 70 milligrams/deciliter  hydrocortisone 2.5% Ointment 1 Application(s) Topical every 6 hours PRN Itching  morphine  - Injectable 1 milliGRAM(s) IV Push daily PRN wound vac/ dressing changes  ondansetron Injectable 4 milliGRAM(s) IV Push every 6 hours PRN Nausea and/or Vomiting  simethicone 160 milliGRAM(s) Chew four times a day PRN Gas  traMADol 25 milliGRAM(s) Oral every 4 hours PRN Severe Pain (7 - 10)      Vital Signs Last 24 Hrs  T(C): 36.9 (23 Aug 2018 13:25), Max: 37.1 (22 Aug 2018 20:59)  T(F): 98.5 (23 Aug 2018 13:25), Max: 98.7 (22 Aug 2018 20:59)  HR: 84 (23 Aug 2018 13:25) (84 - 93)  BP: 114/69 (23 Aug 2018 13:25) (114/69 - 117/73)  BP(mean): --  RR: 18 (23 Aug 2018 13:25) (18 - 18)  SpO2: 96% (23 Aug 2018 13:25) (95% - 96%)  CAPILLARY BLOOD GLUCOSE      POCT Blood Glucose.: 258 mg/dL (23 Aug 2018 12:04)  POCT Blood Glucose.: 183 mg/dL (23 Aug 2018 07:26)  POCT Blood Glucose.: 206 mg/dL (22 Aug 2018 21:45)  POCT Blood Glucose.: 143 mg/dL (22 Aug 2018 16:59)    I&O's Summary    22 Aug 2018 07:01  -  23 Aug 2018 07:00  --------------------------------------------------------  IN: 1200 mL / OUT: 902 mL / NET: 298 mL    23 Aug 2018 07:01  -  23 Aug 2018 13:42  --------------------------------------------------------  IN: 480 mL / OUT: 0 mL / NET: 480 mL          PHYSICAL EXAM:  GENERAL: NAD, well-developed  HEAD:  Atraumatic, Normocephalic  EYES: Conjunctiva and sclera clear  NECK: Supple  CHEST/LUNG: Wheeze scattered.   HEART: Regular rate and rhythm; No murmurs, rubs, or gallops  ABDOMEN: Soft, mildly tender near umbilicus, Nondistended; Bowel sounds present; ostomy present with pink stoma.   EXTREMITIES: No clubbing, cyanosis, or edema  PSYCH/Neuro: AAOx3. Non-focal.       LABS:                        9.8    8.03  )-----------( 425      ( 23 Aug 2018 07:44 )             32.1     08-23    139  |  100  |  8   ----------------------------<  144<H>  4.0   |  27  |  0.56    Ca    9.0      23 Aug 2018 06:22  Phos  3.7     08-23  Mg     1.7     08-23        RADIOLOGY & ADDITIONAL TESTS:    Imaging Personally Reviewed:   Consultant(s) Notes Reviewed: Surgery and pulmonary and ID  Care Discussed with Consultants/Other Providers:

## 2018-08-23 NOTE — PROGRESS NOTE ADULT - ATTENDING COMMENTS
Niko Bhatia MD  Division of [facility]  Cell: [phone]  Pager: [phone]  Office: [phone]

## 2018-08-23 NOTE — PROGRESS NOTE ADULT - ASSESSMENT
The patient is a 66 yo woman with PMH of COPD, ashtma on chronic steroids, tracheobronchomalacia s/p bronchial thermoplasty (10/16), Afib on Eliquis, HTN, adrenal insufficiency, T2DM, DVT, SCC anus (dx 2016, s/p chemo rad s/p APR 7/24/18) presenting from rehabiliation with fevers. The patient stated she felt subjective fevers yesterday to T max 100.2 this morning, she was informed given she is on steroids T > 99 would be fever, prompting her to come to the ED. She is denying any chills URI symptoms of rhinorrhea, sore throat, dysphagia. She has cough that is sometimes productive with greenish phlegm however she has had it for several years. She feels increased SOB non-exertional from decreased inspiratory effort. During patient's previous admission end of July 2018 she developed fevers with positive urine culture. She was treated with 7 day course of ceftriaxone. She is reporting continued dysuria, increased frequency, incontinence. The patient ostomy has not emptied since she was in rehabilitation, she is citing functioing during previous admission prior to rehabilitation. She is denying any sick contacts.    Pt states she has an open draining wound in the perineum, for which she had a wound vac that is now off.  Drainage is malodorous.     Problem/Plan - 1:    ·	HCAP/aspiration pna    - CT chest with multifocal R sided pna.   Agree with broad spectrum abx coverage with vanco/cefepime given recent hospitalization.  Recent sputum cx grew pseudomonas.  Complete 7 day course through 8/21.  Cont to monitor off abx    - Blood cultures NGTD    - r/o DIEUDONNE colonization.  Sputum AFBs negative.      Problem/Plan - 2:    ·	UTI    - (+) UA with urinary symptoms.  Budding yeast seen on UA, urine cultures negative for candida, diflucan d/c'd.  Urine cultures with E.faecalis, sensitive to vanco/ampicillin.  cont present abx.  clinically improving        Problem/Plan - 3:    ·	Open perineal wound    - Pt with recent anal SCC and resection.  s/p wound care evaluation for wound vac/topical care recommendations    - Colorectal surgery f/u    - Cont bowel regimen, monitor stool output        No acute ID issues at this time    Joselyn Esteban  [phone]

## 2018-08-23 NOTE — PROGRESS NOTE ADULT - PROVIDER SPECIALTY LIST ADULT
Colorectal Surgery
Colorectal Surgery
Hospitalist
Infectious Disease
Pulmonology
Surgery
Colorectal Surgery
Colorectal Surgery
Infectious Disease
Colorectal Surgery
Infectious Disease
Infectious Disease
Pulmonology
Pulmonology
Hospitalist
Pulmonology
Pulmonology

## 2018-08-23 NOTE — PROGRESS NOTE ADULT - ASSESSMENT
Assessment  67F s/p APR on 7/24 for rectal cancer, discharged to rehab, now admitted for pneumonia and UTI.    - Continue bowel regimen (senna, colace, miralax) everyday  - Care of UTI and pneumonia per medicine and pulmonary  - Continue wound vac upon discharge. Please confirm with accepting rehab facility that staff is trained to change wound vac 3x per week    Scott Sellers, PGY-2  Red Surgery [phone]

## 2018-08-23 NOTE — PROGRESS NOTE ADULT - SUBJECTIVE AND OBJECTIVE BOX
CHIEF COMPLAINT: f/up-PNA, asthma, TBM, allergies--    Interval Events:    REVIEW OF SYSTEMS:  Constitutional: No fevers or chills. No weight loss. No fatigue or generalized malaise.  Eyes: No itching or discharge from the eyes  ENT: No ear pain. No ear discharge. No nasal congestion. No post nasal drip. No epistaxis. No throat pain. No sore throat. No difficulty swallowing.   CV: No chest pain. No palpitations. No lightheadedness or dizziness.   Resp: No dyspnea at rest. No dyspnea on exertion. No orthopnea. No wheezing. No cough. No stridor. No sputum production. No chest pain with respiration.  GI: No nausea. No vomiting. No diarrhea.  MSK: No joint pain or pain in any extremities  Integumentary: No skin lesions. No pedal edema.  Neurological: No gross motor weakness. No sensory changes.  [ ] All other systems negative  [ ] Unable to assess ROS because ________    OBJECTIVE:  ICU Vital Signs Last 24 Hrs  T(C): 37.1 (22 Aug 2018 20:59), Max: 37.1 (22 Aug 2018 20:59)  T(F): 98.7 (22 Aug 2018 20:59), Max: 98.7 (22 Aug 2018 20:59)  HR: 93 (22 Aug 2018 20:59) (81 - 93)  BP: 117/73 (22 Aug 2018 20:59) (104/68 - 117/73)  BP(mean): --  ABP: --  ABP(mean): --  RR: 18 (22 Aug 2018 20:59) (18 - 20)  SpO2: 95% (22 Aug 2018 20:59) (95% - 98%)        08-21 @ 07:01 - 08-22 @ 07:00  --------------------------------------------------------  IN: 1240 mL / OUT: 2300 mL / NET: -1060 mL    08-22 @ 07:01 - 08-23 @ 05:15  --------------------------------------------------------  IN: 960 mL / OUT: 752 mL / NET: 208 mL      CAPILLARY BLOOD GLUCOSE      POCT Blood Glucose.: 206 mg/dL (22 Aug 2018 21:45)      PHYSICAL EXAM:  General: Awake, alert, oriented X 3.   HEENT: Atraumatic, normocephalic.                 Mallampatti Grade                 No nasal congestion.                No tonsillar or pharyngeal exudates.  Lymph Nodes: No palpable lymphadenopathy  Neck: No JVD. No carotid bruit.   Respiratory: Normal chest expansion                         Normal percussion                         Normal and equal air entry                         No wheeze, rhonchi or rales.  Cardiovascular: S1 S2 normal. No murmurs, rubs or gallops.   Abdomen: Soft, non-tender, non-distended. No organomegaly.  Extremities: Warm to touch. Peripheral pulse palpable. No pedal edema.   Skin: No rashes or skin lesions  Neurological: Motor and sensory examination equal and normal in all four extremities.  Psychiatry: Appropriate mood and affect.    HOSPITAL MEDICATIONS:  MEDICATIONS  (STANDING):  acetaminophen   Tablet. 1000 milliGRAM(s) Oral every 8 hours  ALBUTerol/ipratropium for Nebulization 3 milliLiter(s) Nebulizer every 6 hours  apixaban 5 milliGRAM(s) Oral every 12 hours  buDESOnide 160 MICROgram(s)/formoterol 4.5 MICROgram(s) Inhaler 2 Puff(s) Inhalation two times a day  chlorhexidine 4% Liquid 1 Application(s) Topical <User Schedule>  dextrose 5%. 1000 milliLiter(s) (50 mL/Hr) IV Continuous <Continuous>  dextrose 50% Injectable 12.5 Gram(s) IV Push once  dextrose 50% Injectable 25 Gram(s) IV Push once  dextrose 50% Injectable 25 Gram(s) IV Push once  digoxin     Tablet 0.25 milliGRAM(s) Oral daily  docusate sodium 100 milliGRAM(s) Oral three times a day  guaiFENesin  milliGRAM(s) Oral every 12 hours  insulin glargine Injectable (LANTUS) 12 Unit(s) SubCutaneous at bedtime  insulin lispro (HumaLOG) corrective regimen sliding scale   SubCutaneous three times a day before meals  insulin lispro (HumaLOG) corrective regimen sliding scale   SubCutaneous at bedtime  insulin lispro Injectable (HumaLOG) 6 Unit(s) SubCutaneous two times a day with meals  insulin lispro Injectable (HumaLOG) 4 Unit(s) SubCutaneous with dinner  methylPREDNISolone 4 milliGRAM(s) Oral daily  metoclopramide 5 milliGRAM(s) Oral three times a day  montelukast 10 milliGRAM(s) Oral daily  pantoprazole    Tablet 40 milliGRAM(s) Oral before breakfast  polyethylene glycol 3350 17 Gram(s) Oral every 12 hours  senna 2 Tablet(s) Oral at bedtime  tiotropium 18 MICROgram(s) Capsule 1 Capsule(s) Inhalation daily    MEDICATIONS  (PRN):  dextrose 40% Gel 15 Gram(s) Oral once PRN Blood Glucose LESS THAN 70 milliGRAM(s)/deciliter  diphenhydrAMINE   Capsule 25 milliGRAM(s) Oral every 8 hours PRN Rash and/or Itching  glucagon  Injectable 1 milliGRAM(s) IntraMuscular once PRN Glucose LESS THAN 70 milligrams/deciliter  hydrocortisone 2.5% Ointment 1 Application(s) Topical every 6 hours PRN Itching  morphine  - Injectable 1 milliGRAM(s) IV Push daily PRN wound vac/ dressing changes  ondansetron Injectable 4 milliGRAM(s) IV Push every 6 hours PRN Nausea and/or Vomiting  simethicone 160 milliGRAM(s) Chew four times a day PRN Gas  traMADol 25 milliGRAM(s) Oral every 4 hours PRN Severe Pain (7 - 10)      LABS:                        10.0   6.72  )-----------( 395      ( 21 Aug 2018 07:51 )             32.5     08-21    139  |  99  |  6<L>  ----------------------------<  122<H>  4.2   |  28  |  0.59    Ca    9.2      21 Aug 2018 07:17                MICROBIOLOGY:     RADIOLOGY:  [ ] Reviewed and interpreted by me    Point of Care Ultrasound Findings:    PFT:    EKG: CHIEF COMPLAINT: f/up-PNA, asthma, TBM, allergies--walked twice--slightly unsteady    Interval Events: await rehab    REVIEW OF SYSTEMS:  Constitutional: No fevers or chills. No weight loss. No fatigue or generalized malaise.  Eyes: No itching or discharge from the eyes  ENT: No ear pain. No ear discharge. No nasal congestion. No post nasal drip. No epistaxis. No throat pain. No sore throat. No difficulty swallowing.   CV: No chest pain. No palpitations. No lightheadedness or dizziness.   Resp: No dyspnea at rest. + dyspnea on exertion. No orthopnea. No wheezing. + cough. No stridor. No sputum production. No chest pain with respiration.  GI: No nausea. No vomiting. No diarrhea.  MSK: No joint pain or pain in any extremities  Integumentary: No skin lesions. No pedal edema.  Neurological: No gross motor weakness. No sensory changes.  [+ ] All other systems negative  [ ] Unable to assess ROS because ________    OBJECTIVE:  ICU Vital Signs Last 24 Hrs  T(C): 37.1 (22 Aug 2018 20:59), Max: 37.1 (22 Aug 2018 20:59)  T(F): 98.7 (22 Aug 2018 20:59), Max: 98.7 (22 Aug 2018 20:59)  HR: 93 (22 Aug 2018 20:59) (81 - 93)  BP: 117/73 (22 Aug 2018 20:59) (104/68 - 117/73)  BP(mean): --  ABP: --  ABP(mean): --  RR: 18 (22 Aug 2018 20:59) (18 - 20)  SpO2: 95% (22 Aug 2018 20:59) (95% - 98%)        08-21 @ 07:01  -  08-22 @ 07:00  --------------------------------------------------------  IN: 1240 mL / OUT: 2300 mL / NET: -1060 mL    08-22 @ 07:01  -  08-23 @ 05:15  --------------------------------------------------------  IN: 960 mL / OUT: 752 mL / NET: 208 mL      CAPILLARY BLOOD GLUCOSE      POCT Blood Glucose.: 206 mg/dL (22 Aug 2018 21:45)      PHYSICAL EXAM: NAD in bed -RA  General: Awake, alert, oriented X 3.   HEENT: Atraumatic, normocephalic.                 Mallampatti Grade                 No nasal congestion.                No tonsillar or pharyngeal exudates.  Lymph Nodes: No palpable lymphadenopathy  Neck: No JVD. No carotid bruit.   Respiratory: Normal chest expansion                         Normal percussion                         Normal and equal air entry                         No wheeze, rhonchi or rales.  Cardiovascular: S1 S2 normal. No murmurs, rubs or gallops.   Abdomen: Soft, non-tender, non-distended. No organomegaly. NABS  Extremities: Warm to touch. Peripheral pulse palpable. No pedal edema.   Skin: No rashes or skin lesions  Neurological: Motor and sensory examination equal and normal in all four extremities.  Psychiatry: Appropriate mood and affect.    HOSPITAL MEDICATIONS:  MEDICATIONS  (STANDING):  acetaminophen   Tablet. 1000 milliGRAM(s) Oral every 8 hours  ALBUTerol/ipratropium for Nebulization 3 milliLiter(s) Nebulizer every 6 hours  apixaban 5 milliGRAM(s) Oral every 12 hours  buDESOnide 160 MICROgram(s)/formoterol 4.5 MICROgram(s) Inhaler 2 Puff(s) Inhalation two times a day  chlorhexidine 4% Liquid 1 Application(s) Topical <User Schedule>  dextrose 5%. 1000 milliLiter(s) (50 mL/Hr) IV Continuous <Continuous>  dextrose 50% Injectable 12.5 Gram(s) IV Push once  dextrose 50% Injectable 25 Gram(s) IV Push once  dextrose 50% Injectable 25 Gram(s) IV Push once  digoxin     Tablet 0.25 milliGRAM(s) Oral daily  docusate sodium 100 milliGRAM(s) Oral three times a day  guaiFENesin  milliGRAM(s) Oral every 12 hours  insulin glargine Injectable (LANTUS) 12 Unit(s) SubCutaneous at bedtime  insulin lispro (HumaLOG) corrective regimen sliding scale   SubCutaneous three times a day before meals  insulin lispro (HumaLOG) corrective regimen sliding scale   SubCutaneous at bedtime  insulin lispro Injectable (HumaLOG) 6 Unit(s) SubCutaneous two times a day with meals  insulin lispro Injectable (HumaLOG) 4 Unit(s) SubCutaneous with dinner  methylPREDNISolone 4 milliGRAM(s) Oral daily  metoclopramide 5 milliGRAM(s) Oral three times a day  montelukast 10 milliGRAM(s) Oral daily  pantoprazole    Tablet 40 milliGRAM(s) Oral before breakfast  polyethylene glycol 3350 17 Gram(s) Oral every 12 hours  senna 2 Tablet(s) Oral at bedtime  tiotropium 18 MICROgram(s) Capsule 1 Capsule(s) Inhalation daily    MEDICATIONS  (PRN):  dextrose 40% Gel 15 Gram(s) Oral once PRN Blood Glucose LESS THAN 70 milliGRAM(s)/deciliter  diphenhydrAMINE   Capsule 25 milliGRAM(s) Oral every 8 hours PRN Rash and/or Itching  glucagon  Injectable 1 milliGRAM(s) IntraMuscular once PRN Glucose LESS THAN 70 milligrams/deciliter  hydrocortisone 2.5% Ointment 1 Application(s) Topical every 6 hours PRN Itching  morphine  - Injectable 1 milliGRAM(s) IV Push daily PRN wound vac/ dressing changes  ondansetron Injectable 4 milliGRAM(s) IV Push every 6 hours PRN Nausea and/or Vomiting  simethicone 160 milliGRAM(s) Chew four times a day PRN Gas  traMADol 25 milliGRAM(s) Oral every 4 hours PRN Severe Pain (7 - 10)      LABS:                        10.0   6.72  )-----------( 395      ( 21 Aug 2018 07:51 )             32.5     08-21    139  |  99  |  6<L>  ----------------------------<  122<H>  4.2   |  28  |  0.59    Ca    9.2      21 Aug 2018 07:17                MICROBIOLOGY:     RADIOLOGY:  [ ] Reviewed and interpreted by me    Point of Care Ultrasound Findings:    PFT:    EKG:

## 2018-08-23 NOTE — PROGRESS NOTE ADULT - SUBJECTIVE AND OBJECTIVE BOX
Infectious Diseases progress note:    Subjective: No new fevers.  No  acute o/n events.  Off abx    ROS:  CONSTITUTIONAL:  No fever, chills, rigors  CARDIOVASCULAR:  No chest pain or palpitations  RESPIRATORY:   No SOB, cough, dyspnea on exertion.  No wheezing  GASTROINTESTINAL:  No abd pain, N/V, diarrhea/constipation  EXTREMITIES:  No swelling or joint pain  GENITOURINARY:  No burning on urination, increased frequency or urgency.  No flank pain  NEUROLOGIC:  No HA, visual disturbances  SKIN: No rashes    Allergies    ampicillin (Short breath)  aspirin (Short breath)  Avelox (Short breath; Pruritus)  codeine (Short breath)  Dilaudid (Short breath)  iodine (Short breath; Swelling)  penicillin (Short breath)  shellfish (Anaphylaxis)  tetanus toxoid (Short breath)  Valium (Short breath)    Intolerances        ANTIBIOTICS/RELEVANT:  antimicrobials    immunologic:    OTHER:  acetaminophen   Tablet. 1000 milliGRAM(s) Oral every 8 hours  ALBUTerol/ipratropium for Nebulization 3 milliLiter(s) Nebulizer every 6 hours  apixaban 5 milliGRAM(s) Oral every 12 hours  buDESOnide 160 MICROgram(s)/formoterol 4.5 MICROgram(s) Inhaler 2 Puff(s) Inhalation two times a day  chlorhexidine 4% Liquid 1 Application(s) Topical <User Schedule>  dextrose 40% Gel 15 Gram(s) Oral once PRN  dextrose 5%. 1000 milliLiter(s) IV Continuous <Continuous>  dextrose 50% Injectable 12.5 Gram(s) IV Push once  dextrose 50% Injectable 25 Gram(s) IV Push once  dextrose 50% Injectable 25 Gram(s) IV Push once  digoxin     Tablet 0.25 milliGRAM(s) Oral daily  diphenhydrAMINE   Capsule 25 milliGRAM(s) Oral every 8 hours PRN  docusate sodium 100 milliGRAM(s) Oral three times a day  glucagon  Injectable 1 milliGRAM(s) IntraMuscular once PRN  guaiFENesin  milliGRAM(s) Oral every 12 hours  hydrocortisone 2.5% Ointment 1 Application(s) Topical every 6 hours PRN  insulin glargine Injectable (LANTUS) 12 Unit(s) SubCutaneous at bedtime  insulin lispro (HumaLOG) corrective regimen sliding scale   SubCutaneous three times a day before meals  insulin lispro (HumaLOG) corrective regimen sliding scale   SubCutaneous at bedtime  insulin lispro Injectable (HumaLOG) 6 Unit(s) SubCutaneous two times a day with meals  insulin lispro Injectable (HumaLOG) 4 Unit(s) SubCutaneous with dinner  methylPREDNISolone 4 milliGRAM(s) Oral daily  metoclopramide 5 milliGRAM(s) Oral three times a day  montelukast 10 milliGRAM(s) Oral daily  morphine  - Injectable 1 milliGRAM(s) IV Push daily PRN  ondansetron Injectable 4 milliGRAM(s) IV Push every 6 hours PRN  pantoprazole    Tablet 40 milliGRAM(s) Oral before breakfast  polyethylene glycol 3350 17 Gram(s) Oral every 12 hours  senna 2 Tablet(s) Oral at bedtime  simethicone 160 milliGRAM(s) Chew four times a day PRN  tiotropium 18 MICROgram(s) Capsule 1 Capsule(s) Inhalation daily  traMADol 25 milliGRAM(s) Oral every 4 hours PRN      Objective:  Vital Signs Last 24 Hrs  T(C): 36.9 (23 Aug 2018 13:25), Max: 37.1 (22 Aug 2018 20:59)  T(F): 98.5 (23 Aug 2018 13:25), Max: 98.7 (22 Aug 2018 20:59)  HR: 84 (23 Aug 2018 13:25) (84 - 93)  BP: 114/69 (23 Aug 2018 13:25) (114/69 - 117/73)  BP(mean): --  RR: 18 (23 Aug 2018 13:25) (18 - 18)  SpO2: 96% (23 Aug 2018 13:25) (95% - 96%)    PHYSICAL EXAM:  Constitutional:NAD  Eyes:EBER, EOMI  Ear/Nose/Throat: no thrush, mucositis.  Moist mucous membranes	  Neck:no JVD, no lymphadenopathy, supple  Respiratory: CTA barry  Cardiovascular: S1S2 RRR, no murmurs  Gastrointestinal:soft, nontender,  nondistended (+) BS  Extremities:no e/e/c  Skin:  no rashes, open wounds or ulcerations        LABS:                        9.8    8.03  )-----------( 425      ( 23 Aug 2018 07:44 )             32.1     08-23    139  |  100  |  8   ----------------------------<  144<H>  4.0   |  27  |  0.56    Ca    9.0      23 Aug 2018 06:22  Phos  3.7     08-23  Mg     1.7     08-23                  Vancomycin Level, Trough: 17.9 ug/mL (08-22 @ 09:31)  Vancomycin Level, Trough: 15.9 ug/mL (08-20 @ 10:14)  Vancomycin Level, Trough: 12.1 ug/mL (08-18 @ 20:21)      Rapid RVP Result: [facility]          MICROBIOLOGY:    Culture - Acid Fast - Sputum w/Smear . (08.22.18 @ 08:50)    Specimen Source: .Sputum Sputum    Acid Fast Bacilli Smear:   No acid fast bacilli seen by fluorochrome stain    Culture - Sputum . (08.21.18 @ 09:47)    Gram Stain:   Numerous polymorphonuclear leukocytes per low power field  Few Squamous epithelial cells per low power field  Few Yeast like cells per oil power field  Rare Gram Positive Rods per oil power field    Specimen Source: .Sputum Sputum    Culture Results:   Normal Respiratory Val present    Culture - Blood (08.14.18 @ 17:19)    Specimen Source: .Blood Blood-Venous    Culture Results:   No growth at 5 days.    Culture - Urine (08.16.18 @ 08:50)    -  Levofloxacin: R >4    -  Nitrofurantoin: S <=32 Should not be used to treat pyelonephritis.    -  Tetra/Doxy: R >8    -  Ampicillin: S <=2 Predicts results to ampicillin/sulbactam, amoxacillin-clavulanate and  piperacillin-tazobactam.    -  Ciprofloxacin: R >2    -  Vancomycin: S 2    Specimen Source: .Urine Clean Catch (Midstream)    Culture Results:   10,000 - 49,000 CFU/mL Enterococcus faecalis  <10,000 CFU/ml Normal Urogenital val present    Organism Identification: Enterococcus faecalis    Organism: Enterococcus faecalis    Method Type: GARRETT          RADIOLOGY & ADDITIONAL STUDIES:    < from: CT Abdomen and Pelvis w/ Oral Cont (08.17.18 @ 17:51) >  IMPRESSION:    Limited examination. Proximal to the left lower quadrant colostomy bag   the colon is filled with stool and there may be possible wall thickening,   evaluation is limited without intravenous contrast or oral contrast just   distal. Proximal to this the colon is mildly distended with fluid and air   without wall thickening. Findings are new since the previous exam.   Findings may represent a partial obstructive process at the colostomy   site with associated colitis. Correlate clinically.    < end of copied text >

## 2018-08-23 NOTE — PROGRESS NOTE ADULT - PROBLEM SELECTOR PLAN 10
-Diet: C/w soft low fiber diabetic diet.   -VTE PPx: On Eliquis 5mg BID.  -PT eval recs MADISON. Wound vac in place.    11. Dispo: -Plan for DC to [facility] today with outpatient CRS and pulmonary follow up. -Diet: C/w soft low fiber diabetic diet.   -VTE PPx: On Eliquis 5mg BID.  -PT eval recmarilyn WALLACE. Wound vac in place.    11. Dispo: -Plan for DC to [facility] today with outpatient CRS and pulmonary follow up. -38 minutes spent on the discharge process.

## 2018-08-23 NOTE — PROGRESS NOTE ADULT - PROBLEM SELECTOR PLAN 5
-S/p mitomycin/xeloda and RT 5/17 to 6/17. PET 1/18 showed hypermetabolism in anal area with rad onc concerned for local relapse/failure proved by biopsy 2/18  -Underwent APR with Dr. Terrell Luong 7/24/18 with permanent end colostomy  -Purulence around wound, however it was draining what appeared to have been mucus.  -CRS recs appreciated, PT wound care placed wound vac. Needs vac changes TIW.  -C/w miralax, senna, and colace for constipation.   -Outpatient f/u with Dr. King.  -Ostomy care.   -Pain control PRN.   -C/w soft low fiber diet, so far tolerating.   -Patient reports some queasiness/nausea, so will c/w Reglan 5mg PO TID before meals for now. EKG Qtc 415. Monitor EKG for Qtc. So far the Reglan is helping per patient. Consider changed to PRN once symptoms stay improved.   -C/w zofran PRN.

## 2018-08-23 NOTE — PROGRESS NOTE ADULT - PROBLEM SELECTOR PLAN 1
-CT scan showed worsening opacity in RLL, CT chest showed possibility of DIEUDONNE infection, with tree in bud opacities.  -Completed cefepime and vancomycin (total 8 days), discussed with ID attending.   -Urine legionella was negative so DC'ed azithromycin.   -C/w Acapella, duonebs, aspiration precautions, incentive spirometer.   -Appreciate pulmonary and ID recs  -AFB x 3 needed (3/3 sent). All negative. IDEUDONNE ruled out.     -Sputum culture growing normal respiratory val.   -Blood cultures; NGTD.  -C/w Symbicort and Spiriva and Singulair.  -C/w guaifenesin ER 600mg Q12H for cough.

## 2018-08-23 NOTE — PROGRESS NOTE ADULT - ATTENDING COMMENTS
as above--  asthma, TBM, chronic bronchitis---CT c/w new PNA vs UTI (? DIEUDONNE)  F/up all cx--re send sputum for C and S and AFB x3 days; continue cefepime/vanco (covers pseudomonas)--ID luis (Afshan Stout et al)-diflucan for yeast in urine D8 of abx--? completed  Fzjds-Bmjgog-JC-on AC  PT needed     DC pending    Eulalio Allison MD-Pulmonary   [phone] as above--  asthma, TBM, chronic bronchitis---CT c/w new PNA vs UTI (? DIEUDONNE)  F/up all cx--re send sputum for C and S and AFB x3 days; continue cefepime/vanco (covers pseudomonas)--ID luis (Afshan Stout et al)-diflucan for yeast in urine D8 of abx--now completed  Igeen-Liurby-ZY-on AC  PT needed     DC pending    Eulalio Allison MD-Pulmonary   [phone]

## 2018-08-23 NOTE — CHART NOTE - NSCHARTNOTEFT_GEN_A_CORE
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 However, states current diet is 'difficult to digest' and reports decreased ostomy output (usual 200-400cc) 52cc (yesterday) and 100cc today thus far-  requesting low fiber diet. Discussed with NP.     PO intake:  < 50% [ ] 50-75% [ ]   % [ x]  other :     Source for PO intake [ x] Patient [ ] family [ x] chart [ ] staff [ ] other: Pt reports good appetite and > 75% po intakes of meals, noted 75% per flowsheet. Pt also drinking Glucerna 2 x day    Current Weight: 63.2 (8/20), 68.7 pounds (today) - increase noted - scale error? actual wt gain with improved po intakes?  % Weight Change    Pertinent Medications: MEDICATIONS  (STANDING):  acetaminophen   Tablet. 1000 milliGRAM(s) Oral every 8 hours  ALBUTerol/ipratropium for Nebulization 3 milliLiter(s) Nebulizer every 6 hours  apixaban 5 milliGRAM(s) Oral every 12 hours  buDESOnide 160 MICROgram(s)/formoterol 4.5 MICROgram(s) Inhaler 2 Puff(s) Inhalation two times a day  chlorhexidine 4% Liquid 1 Application(s) Topical <User Schedule>  dextrose 5%. 1000 milliLiter(s) (50 mL/Hr) IV Continuous <Continuous>  dextrose 50% Injectable 12.5 Gram(s) IV Push once  dextrose 50% Injectable 25 Gram(s) IV Push once  dextrose 50% Injectable 25 Gram(s) IV Push once  digoxin     Tablet 0.25 milliGRAM(s) Oral daily  docusate sodium 100 milliGRAM(s) Oral three times a day  guaiFENesin  milliGRAM(s) Oral every 12 hours  insulin glargine Injectable (LANTUS) 12 Unit(s) SubCutaneous at bedtime  insulin lispro (HumaLOG) corrective regimen sliding scale   SubCutaneous three times a day before meals  insulin lispro (HumaLOG) corrective regimen sliding scale   SubCutaneous at bedtime  insulin lispro Injectable (HumaLOG) 6 Unit(s) SubCutaneous two times a day with meals  insulin lispro Injectable (HumaLOG) 4 Unit(s) SubCutaneous with dinner  methylPREDNISolone 4 milliGRAM(s) Oral daily  metoclopramide 5 milliGRAM(s) Oral three times a day  montelukast 10 milliGRAM(s) Oral daily  pantoprazole    Tablet 40 milliGRAM(s) Oral before breakfast  polyethylene glycol 3350 17 Gram(s) Oral every 12 hours  senna 2 Tablet(s) Oral at bedtime  tiotropium 18 MICROgram(s) Capsule 1 Capsule(s) Inhalation daily    MEDICATIONS  (PRN):  dextrose 40% Gel 15 Gram(s) Oral once PRN Blood Glucose LESS THAN 70 milliGRAM(s)/deciliter  diphenhydrAMINE   Capsule 25 milliGRAM(s) Oral every 8 hours PRN Rash and/or Itching  glucagon  Injectable 1 milliGRAM(s) IntraMuscular once PRN Glucose LESS THAN 70 milligrams/deciliter  hydrocortisone 2.5% Ointment 1 Application(s) Topical every 6 hours PRN Itching  morphine  - Injectable 1 milliGRAM(s) IV Push daily PRN wound vac/ dressing changes  ondansetron Injectable 4 milliGRAM(s) IV Push every 6 hours PRN Nausea and/or Vomiting  simethicone 160 milliGRAM(s) Chew four times a day PRN Gas  traMADol 25 milliGRAM(s) Oral every 4 hours PRN Severe Pain (7 - 10)    Pertinent Labs:  08-23 Na139 mmol/L Glu 144 mg/dL<H> K+ 4.0 mmol/L Cr  0.56 mg/dL BUN 8 mg/dL 08-23 Phos 3.7 mg/dL 08-18 [id number] 7.6 %<H>; Fingersticks (8/21-23) 117-360      Skin:     Estimated Needs:   [ x] no change since previous assessment  [ ] recalculated:       Previous Nutrition Diagnosis:     [ ] Inadequate Energy Intake [ ]Inadequate Oral Intake [ ] Excessive Energy Intake     [ ] Underweight [ ] Increased Nutrient Needs [ ] Overweight/Obesity     [ ] Altered GI Function [ ] Unintended Weight Loss [ ] Food & Nutrition Related Knowledge Deficit [x ] Malnutrition          Nutrition Diagnosis is - being addressed with improved tolerance for po diet and Glucerna supplements         New Nutrition Diagnosis: [x ] not applicable      Recommend    [x ] Change Diet To: Recommend to change diet to Consistent Carbohydrate Low Fiber - discussed with NP    [ x] Nutrition Supplement: Glucerna 2 x day    [ ] Nutrition Support    [ ] Other:        Monitoring and Evaluation:     [ x] PO intake [x] Tolerance to diet prescription [ x] weights [ ] follow up per protocol    [ ] other:

## 2018-08-23 NOTE — PROGRESS NOTE ADULT - PROBLEM SELECTOR PLAN 2
abnormal sputum in past--on empiric ABX-last admit pseudomonas present  ID f/up evaln--re-cx sputum and afb x3 (?nathaniel)--all neg thus far

## 2018-08-23 NOTE — PROGRESS NOTE ADULT - PROBLEM SELECTOR PLAN 6
-C/w senna, colace, and miralax.   -AXR from 8/16 showed ileus  -S/p Enema through ostomy done by CRS with successful increase in stool output.  -C/w soft low fiber diet, so far tolerating.   -Stool counts and monitor ostomy output.

## 2018-08-29 ENCOUNTER — APPOINTMENT (OUTPATIENT)
Dept: COLORECTAL SURGERY | Facility: CLINIC | Age: 68
End: 2018-08-29
Payer: MEDICARE

## 2018-08-29 ENCOUNTER — APPOINTMENT (OUTPATIENT)
Dept: PULMONOLOGY | Facility: CLINIC | Age: 68
End: 2018-08-29
Payer: COMMERCIAL

## 2018-08-29 VITALS
DIASTOLIC BLOOD PRESSURE: 60 MMHG | WEIGHT: 136 LBS | HEIGHT: 67 IN | BODY MASS INDEX: 21.35 KG/M2 | HEART RATE: 96 BPM | RESPIRATION RATE: 17 BRPM | SYSTOLIC BLOOD PRESSURE: 120 MMHG | OXYGEN SATURATION: 97 %

## 2018-08-29 PROCEDURE — 99214 OFFICE O/P EST MOD 30 MIN: CPT | Mod: 25

## 2018-08-29 PROCEDURE — 99024 POSTOP FOLLOW-UP VISIT: CPT

## 2018-08-29 PROCEDURE — 71046 X-RAY EXAM CHEST 2 VIEWS: CPT

## 2018-08-30 ENCOUNTER — OUTPATIENT (OUTPATIENT)
Dept: OUTPATIENT SERVICES | Facility: HOSPITAL | Age: 68
LOS: 1 days | Discharge: HOME | End: 2018-08-30

## 2018-08-30 DIAGNOSIS — C18.9 MALIGNANT NEOPLASM OF COLON, UNSPECIFIED: ICD-10-CM

## 2018-08-30 DIAGNOSIS — Z87.09 PERSONAL HISTORY OF OTHER DISEASES OF THE RESPIRATORY SYSTEM: Chronic | ICD-10-CM

## 2018-08-30 DIAGNOSIS — Z98.89 OTHER SPECIFIED POSTPROCEDURAL STATES: Chronic | ICD-10-CM

## 2018-08-30 DIAGNOSIS — Z98.890 OTHER SPECIFIED POSTPROCEDURAL STATES: Chronic | ICD-10-CM

## 2018-08-30 DIAGNOSIS — Z96.653 PRESENCE OF ARTIFICIAL KNEE JOINT, BILATERAL: Chronic | ICD-10-CM

## 2018-08-30 DIAGNOSIS — H05.352 EXOSTOSIS OF LEFT ORBIT: Chronic | ICD-10-CM

## 2018-08-30 DIAGNOSIS — K62.5 HEMORRHAGE OF ANUS AND RECTUM: Chronic | ICD-10-CM

## 2018-08-30 LAB — SURGICAL PATHOLOGY STUDY: SIGNIFICANT CHANGE UP

## 2018-09-04 ENCOUNTER — RESULT REVIEW (OUTPATIENT)
Age: 68
End: 2018-09-04

## 2018-09-04 ENCOUNTER — APPOINTMENT (OUTPATIENT)
Dept: HEMATOLOGY ONCOLOGY | Facility: CLINIC | Age: 68
End: 2018-09-04
Payer: MEDICARE

## 2018-09-04 VITALS
SYSTOLIC BLOOD PRESSURE: 110 MMHG | WEIGHT: 139 LBS | OXYGEN SATURATION: 96 % | DIASTOLIC BLOOD PRESSURE: 66 MMHG | TEMPERATURE: 98.9 F | HEART RATE: 87 BPM | BODY MASS INDEX: 21.77 KG/M2 | RESPIRATION RATE: 16 BRPM

## 2018-09-04 LAB
BASOPHILS # BLD AUTO: 0 K/UL — SIGNIFICANT CHANGE UP (ref 0–0.2)
BASOPHILS NFR BLD AUTO: 0.1 % — SIGNIFICANT CHANGE UP (ref 0–2)
EOSINOPHIL # BLD AUTO: 0.3 K/UL — SIGNIFICANT CHANGE UP (ref 0–0.5)
EOSINOPHIL NFR BLD AUTO: 2.9 % — SIGNIFICANT CHANGE UP (ref 0–6)
HCT VFR BLD CALC: 33.6 % — LOW (ref 34.5–45)
HGB BLD-MCNC: 10.6 G/DL — LOW (ref 11.5–15.5)
LYMPHOCYTES # BLD AUTO: 1.3 K/UL — SIGNIFICANT CHANGE UP (ref 1–3.3)
LYMPHOCYTES # BLD AUTO: 12.2 % — LOW (ref 13–44)
MCHC RBC-ENTMCNC: 23 PG — LOW (ref 27–34)
MCHC RBC-ENTMCNC: 31.5 G/DL — LOW (ref 32–36)
MCV RBC AUTO: 73.1 FL — LOW (ref 80–100)
MONOCYTES # BLD AUTO: 0.6 K/UL — SIGNIFICANT CHANGE UP (ref 0–0.9)
MONOCYTES NFR BLD AUTO: 6.2 % — SIGNIFICANT CHANGE UP (ref 2–14)
NEUTROPHILS # BLD AUTO: 8.2 K/UL — HIGH (ref 1.8–7.4)
NEUTROPHILS NFR BLD AUTO: 78.6 % — HIGH (ref 43–77)
PLATELET # BLD AUTO: 373 K/UL — SIGNIFICANT CHANGE UP (ref 150–400)
RBC # BLD: 4.6 M/UL — SIGNIFICANT CHANGE UP (ref 3.8–5.2)
RBC # FLD: 15.5 % — HIGH (ref 10.3–14.5)
RETICS #: 106 K/UL — SIGNIFICANT CHANGE UP (ref 25–125)
RETICS/RBC NFR: 2.3 % — SIGNIFICANT CHANGE UP (ref 0.5–2.5)
WBC # BLD: 10.4 K/UL — SIGNIFICANT CHANGE UP (ref 3.8–10.5)
WBC # FLD AUTO: 10.4 K/UL — SIGNIFICANT CHANGE UP (ref 3.8–10.5)

## 2018-09-04 PROCEDURE — 99215 OFFICE O/P EST HI 40 MIN: CPT

## 2018-09-04 RX ORDER — NEOMYCIN SULFATE 500 MG/1
500 TABLET ORAL
Qty: 6 | Refills: 0 | Status: COMPLETED | COMMUNITY
Start: 2018-07-11 | End: 2018-07-23

## 2018-09-04 RX ORDER — METHYLPREDNISOLONE 4 MG/1
4 TABLET ORAL DAILY
Qty: 90 | Refills: 0 | Status: DISCONTINUED | COMMUNITY
Start: 2017-02-12 | End: 2018-09-04

## 2018-09-04 RX ORDER — AZITHROMYCIN 500 MG/1
500 TABLET, FILM COATED ORAL DAILY
Qty: 5 | Refills: 0 | Status: COMPLETED | COMMUNITY
Start: 2018-07-13 | End: 2018-07-30

## 2018-09-04 RX ORDER — SULFAMETHOXAZOLE AND TRIMETHOPRIM 800; 160 MG/1; MG/1
800-160 TABLET ORAL TWICE DAILY
Qty: 1 | Refills: 0 | Status: COMPLETED | COMMUNITY
Start: 2018-06-18 | End: 2018-07-30

## 2018-09-04 RX ORDER — ONDANSETRON 8 MG/1
8 TABLET, ORALLY DISINTEGRATING ORAL EVERY 8 HOURS
Qty: 30 | Refills: 0 | Status: DISCONTINUED | COMMUNITY
Start: 2018-04-03 | End: 2018-09-04

## 2018-09-05 LAB
ALBUMIN SERPL ELPH-MCNC: 3.7 G/DL
ALP BLD-CCNC: 96 U/L
ALT SERPL-CCNC: 5 U/L
ANION GAP SERPL CALC-SCNC: 17 MMOL/L
APPEARANCE: CLEAR
AST SERPL-CCNC: 13 U/L
BACTERIA: NEGATIVE
BILIRUB SERPL-MCNC: <0.2 MG/DL
BILIRUBIN URINE: NEGATIVE
BLOOD URINE: NEGATIVE
BUN SERPL-MCNC: 10 MG/DL
CALCIUM SERPL-MCNC: 9.4 MG/DL
CHLORIDE SERPL-SCNC: 101 MMOL/L
CO2 SERPL-SCNC: 23 MMOL/L
COLOR: YELLOW
CREAT SERPL-MCNC: 0.51 MG/DL
FERRITIN SERPL-MCNC: 50 NG/ML
GLUCOSE QUALITATIVE U: NEGATIVE MG/DL
GLUCOSE SERPL-MCNC: 173 MG/DL
HYALINE CASTS: 0 /LPF
KETONES URINE: NEGATIVE
LEUKOCYTE ESTERASE URINE: NEGATIVE
MICROSCOPIC-UA: NORMAL
NITRITE URINE: NEGATIVE
PH URINE: 5.5
POTASSIUM SERPL-SCNC: 4.8 MMOL/L
PROT SERPL-MCNC: 6.9 G/DL
PROTEIN URINE: NEGATIVE MG/DL
RED BLOOD CELLS URINE: 6 /HPF
SODIUM SERPL-SCNC: 141 MMOL/L
SPECIFIC GRAVITY URINE: 1.02
SQUAMOUS EPITHELIAL CELLS: 0 /HPF
UROBILINOGEN URINE: NEGATIVE MG/DL
WHITE BLOOD CELLS URINE: 2 /HPF

## 2018-09-06 LAB — BACTERIA UR CULT: NORMAL

## 2018-09-11 ENCOUNTER — EMERGENCY (EMERGENCY)
Facility: HOSPITAL | Age: 68
LOS: 1 days | Discharge: HOME | End: 2018-09-11
Attending: EMERGENCY MEDICINE
Payer: MEDICARE

## 2018-09-11 VITALS
DIASTOLIC BLOOD PRESSURE: 75 MMHG | HEART RATE: 82 BPM | TEMPERATURE: 98 F | RESPIRATION RATE: 15 BRPM | SYSTOLIC BLOOD PRESSURE: 128 MMHG | OXYGEN SATURATION: 96 %

## 2018-09-11 VITALS
DIASTOLIC BLOOD PRESSURE: 71 MMHG | TEMPERATURE: 98 F | HEART RATE: 84 BPM | SYSTOLIC BLOOD PRESSURE: 126 MMHG | OXYGEN SATURATION: 96 % | RESPIRATION RATE: 20 BRPM

## 2018-09-11 DIAGNOSIS — H05.352 EXOSTOSIS OF LEFT ORBIT: Chronic | ICD-10-CM

## 2018-09-11 DIAGNOSIS — Z98.890 OTHER SPECIFIED POSTPROCEDURAL STATES: Chronic | ICD-10-CM

## 2018-09-11 DIAGNOSIS — Z96.653 PRESENCE OF ARTIFICIAL KNEE JOINT, BILATERAL: Chronic | ICD-10-CM

## 2018-09-11 DIAGNOSIS — K62.5 HEMORRHAGE OF ANUS AND RECTUM: Chronic | ICD-10-CM

## 2018-09-11 DIAGNOSIS — Z87.09 PERSONAL HISTORY OF OTHER DISEASES OF THE RESPIRATORY SYSTEM: Chronic | ICD-10-CM

## 2018-09-11 DIAGNOSIS — Z98.89 OTHER SPECIFIED POSTPROCEDURAL STATES: Chronic | ICD-10-CM

## 2018-09-11 PROCEDURE — 99284 EMERGENCY DEPT VISIT MOD MDM: CPT

## 2018-09-11 PROCEDURE — 99282 EMERGENCY DEPT VISIT SF MDM: CPT | Mod: GC

## 2018-09-11 NOTE — CONSULT NOTE ADULT - ASSESSMENT
Shirley Walsh is a 67 y.o. woman with history of rectal cancer s/p APR (7/24/18) who presents to the ED from rehab for concern for urine coming from her post-surgical wound. No sign of fistulization on physical exam. No sign of purulence or fecal material in urine left at bedside.     Plan:  - return to rehab  - Reapply VAC at rehab  - Follow up with Dr. Luong as planned    Junito Hogan, PGY2  [phone]

## 2018-09-11 NOTE — CONSULT NOTE ADULT - SUBJECTIVE AND OBJECTIVE BOX
General Surgery Consult  Consulting surgical team: Red  Consulting attending: Dr. Luong     HPI: Shirley Walsh is a 67 y.o. woman with history of rectal cancer s/p APR (7/24/18) who presents to the ED from rehab for concern for urine coming from her post-surgical wound. The patient states that her surgical wound is being managed at the rehab with a VAC. She states that her VAC was leaking last night and, when she urinated, some of the urine went into the VAC. The patient was sent to the ED by the rehab physician for concern that the urine was coming from he wound. The patient denies any dysuria, fever, chills, or abdominal pain.       PAST MEDICAL HISTORY:  TIA (transient ischemic attack)  DVT (deep venous thrombosis)  Seizure  Rectal bleeding  Aortic disease  Colorectal cancer  Tracheobronchomalacia  Hypertension  Asthma  Pelvic fracture  Aortic insufficiency  Adrenal insufficiency  COPD (chronic obstructive pulmonary disease)  Hypertension  Diabetes  Atrial fibrillation      PAST SURGICAL HISTORY:  Rectal bleeding  S/P bronchoscopy  History of tracheomalacia  History of surgery  H/O pelvic surgery  Exostosis of orbit, left  History of sinus surgery  H/O total knee replacement, bilateral  History of partial hysterectomy      MEDICATIONS:      ALLERGIES:  ampicillin (Short breath)  aspirin (Short breath)  Avelox (Short breath; Pruritus)  codeine (Short breath)  Dilaudid (Short breath)  iodine (Short breath; Swelling)  penicillin (Short breath)  shellfish (Anaphylaxis)  tetanus toxoid (Short breath)  Valium (Short breath)      VITALS & I/Os:  Vital Signs Last 24 Hrs  T(C): 36.8 (11 Sep 2018 11:33), Max: 37.1 (11 Sep 2018 10:21)  T(F): 98.3 (11 Sep 2018 11:33), Max: 98.8 (11 Sep 2018 10:21)  HR: 83 (11 Sep 2018 11:33) (83 - 88)  BP: 131/64 (11 Sep 2018 11:33) (126/71 - 131/64)  BP(mean): --  RR: 16 (11 Sep 2018 11:33) (16 - 20)  SpO2: 95% (11 Sep 2018 11:33) (95% - 98%)    I&O's Summary      PHYSICAL EXAM:  General: No acute distress, AOx3  Respiratory: Nonlabored  Cardiovascular: normotensive, regular rate   Abdominal: Soft, nondistended, nontender. No rebound or guarding. No organomegaly, no palpable mass. Ostomy pink with solid function in appliance.   Rectum: 2 small punctate wound to the perineum, anterior wound with some seropurulent discharge, no surrounding erythema or tenderness with palpation, wound is approximately 2 cm deep   Extremities: Warm

## 2018-09-11 NOTE — ED PROVIDER NOTE - SKIN, MLM
Skin normal color for race, warm, dry and intact. Wound: packing removed by surgery resident at bedside. Residual mild drainage but no redness, fluctuance, tenderness, bleeding or purulence

## 2018-09-11 NOTE — ED PROVIDER NOTE - PSH
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  H/O pelvic surgery  5 years ago - s/p fracture  H/O total knee replacement, bilateral  5 years ago  History of partial hysterectomy  30 years ago - fibroids  History of sinus surgery  multiple sinus surgeries  History of tracheomalacia  2015 - attempted tracheal stenting ([facility])- course complicated by obstruction, respiratory failure, multiple CPR attempts -  stent discontinued; 10/20/2016 Tracheobronchoplasty (Prolene Mesh) performed at [facility] by Dr Zapien  Rectal bleeding  exam under anesthesia ([facility]) 2/2018  S/P bronchoscopy  6/5/2018 - [facility] (Dr Zapien) no evidence of tracheobronchomalacia in trachea or bronchial tubes

## 2018-09-11 NOTE — ED PROVIDER NOTE - ATTENDING CONTRIBUTION TO CARE
I, Akilah Haq MD, personally saw the patient with the resident, and completed the key components of the history and physical exam. I then discussed the management plan with the resident.     67 year old female with recent colon resection sent in by rehab for concern of urine in wound vac - vac replaced by them prior to arrival. Pt in no distress and has no complaints, did not want to come in for evaluation. Physical exam A&Ox3, no distress, MMM, RRR, clear lungs, soft nontender abd, perineal wound with residual mild drainage but no redness, fluctuance, tenderness, bleeding or purulence and does not appear infected. Seen and cleared by surgery resident for wound vac replacement at rehab and to f/up as outpatient

## 2018-09-11 NOTE — ED ADULT NURSE NOTE - OBJECTIVE STATEMENT
67y f pt sent from [facility] rehab via ems; emt reports there was urine in pt rectal wound vac yesterday; pt states wound vac was changed yesterday; pt states nurses were having a problem changing it and kept saying they had to "patch" something; pt states saw urine and heard slurping sounds after vac was changed; pt aox3; no resp distress; +cough; no chest pain; no abd pain; no n/v/d; no fever/chills; pt states vac placed 7/24; vac is changed every Mon/Wed/Fri; pt states has known 4 /12cm fistula under wound; pt refusing any type of blood work; feels all that needs to be done is replace the wound vac; pt states vac was removed and now has a wet to dry dressing on; safety/comfort maintained

## 2018-09-11 NOTE — ED PROVIDER NOTE - PROGRESS NOTE DETAILS
Spoke to surgery resident who said he will speak w/ DR Wong Pt seen, evaluated, and cleared by surgery resident to go back to nursing facility for wound vac replacement.

## 2018-09-11 NOTE — ED ADULT NURSE NOTE - NSIMPLEMENTINTERV_GEN_ALL_ED
Implemented All Universal Safety Interventions:  [place] to call system. Call bell, personal items and telephone within reach. Instruct patient to call for assistance. Room bathroom lighting operational. Non-slip footwear when patient is off stretcher. Physically safe environment: no spills, clutter or unnecessary equipment. Stretcher in lowest position, wheels locked, appropriate side rails in place.

## 2018-09-11 NOTE — ED PROVIDER NOTE - MEDICAL DECISION MAKING DETAILS
Pt with no complaints today,sent in by rehab MD for ?urine leaking from wound vac; wound does not appear infected on exam, draining very slightly cloudy fluid. Surgery consulted for wound and further workup recommendations

## 2018-09-11 NOTE — ED ADULT NURSE NOTE - PSH
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  H/O pelvic surgery  5 years ago - s/p fracture  H/O total knee replacement, bilateral  5 years ago  History of partial hysterectomy  30 years ago - fibroids  History of sinus surgery  multiple sinus surgeries  History of tracheomalacia  2015 - attempted tracheal stenting ([facility])- course complicated by obstruction, respiratory failure, multiple CPR attempts -  stent discontinued; 10/20/2016 Tracheobronchoplasty (Prolene Mesh) performed at [facility] by Dr Zapien  Rectal bleeding  exam under anesthesia ([facility]) 2/2018  S/P bronchoscopy  6/5/2018 - [facility] (Dr Zapien) no evidence of tracheobronchomalacia in trachea or bronchial tubes

## 2018-09-11 NOTE — ED PROVIDER NOTE - PHYSICAL EXAMINATION
General: Alert and Orientated x 3. No apparent distress.  Head: Normocephalic and atraumatic.  Eyes: PERRLA with EOMI.  Neck: Supple. Trachea midline.   Cardiac: Normal S1 and S2 w/ regular rate.  Pulmonary: Vesicular breath sounds bilaterally. No increased WOB. No wheezes or crackles.  Abdominal: Soft, non-tender. Clean ostomy on LLQ  Neurologic: No focal sensory or motor deficits.  Musculoskeletal: Strength appropriate in all 4 extremities  Skin: Color appropriate for race. Intact, warm, and well-perfused.  Psychiatric: Appropriate mood and affect. No apparent risk to self or others. General: Alert and Orientated x 3. No apparent distress.  Head: Normocephalic and atraumatic.  Eyes: PERRLA with EOMI.  Neck: Supple. Trachea midline.   Cardiac: Normal S1 and S2 w/ regular rate.  Pulmonary: Vesicular breath sounds bilaterally. No increased WOB. No wheezes or crackles.  Abdominal: Soft, non-tender. Clean ostomy on LLQ. Small surgical wound in perineal area draining very slightly cloudy fluid w/ packing in place, no wound vac in place.   Neurologic: No focal sensory or motor deficits.  Musculoskeletal: Strength appropriate in all 4 extremities  Skin: Color appropriate for race. Intact, warm, and well-perfused.  Psychiatric: Appropriate mood and affect. No apparent risk to self or others.

## 2018-09-11 NOTE — ED PROVIDER NOTE - OBJECTIVE STATEMENT
Pt claims she was sent in by rehab MD due to concern of urine draining from wound vac. Presently has no new symptoms. She believes the vac was likely being infiltrated with urine when she was urinating on the bed pan at the rehab facility given its tilt in the bed. Otherwise has no complaints today.

## 2018-09-13 ENCOUNTER — APPOINTMENT (OUTPATIENT)
Dept: INFUSION THERAPY | Facility: HOSPITAL | Age: 68
End: 2018-09-13

## 2018-09-19 ENCOUNTER — APPOINTMENT (OUTPATIENT)
Dept: COLORECTAL SURGERY | Facility: CLINIC | Age: 68
End: 2018-09-19
Payer: MEDICARE

## 2018-09-19 PROCEDURE — 99024 POSTOP FOLLOW-UP VISIT: CPT

## 2018-09-20 ENCOUNTER — APPOINTMENT (OUTPATIENT)
Dept: INFUSION THERAPY | Facility: HOSPITAL | Age: 68
End: 2018-09-20

## 2018-10-01 ENCOUNTER — APPOINTMENT (OUTPATIENT)
Dept: UROLOGY | Facility: CLINIC | Age: 68
End: 2018-10-01

## 2018-10-06 LAB
CULTURE RESULTS: SIGNIFICANT CHANGE UP
SPECIMEN SOURCE: SIGNIFICANT CHANGE UP

## 2018-10-10 LAB
CULTURE RESULTS: SIGNIFICANT CHANGE UP
CULTURE RESULTS: SIGNIFICANT CHANGE UP
SPECIMEN SOURCE: SIGNIFICANT CHANGE UP
SPECIMEN SOURCE: SIGNIFICANT CHANGE UP

## 2018-10-16 ENCOUNTER — APPOINTMENT (OUTPATIENT)
Dept: COLORECTAL SURGERY | Facility: CLINIC | Age: 68
End: 2018-10-16
Payer: MEDICARE

## 2018-10-16 PROCEDURE — 99024 POSTOP FOLLOW-UP VISIT: CPT

## 2018-10-17 ENCOUNTER — CLINICAL ADVICE (OUTPATIENT)
Age: 68
End: 2018-10-17

## 2018-10-22 ENCOUNTER — APPOINTMENT (OUTPATIENT)
Dept: PULMONOLOGY | Facility: CLINIC | Age: 68
End: 2018-10-22
Payer: MEDICARE

## 2018-10-22 VITALS
SYSTOLIC BLOOD PRESSURE: 124 MMHG | RESPIRATION RATE: 17 BRPM | WEIGHT: 193 LBS | BODY MASS INDEX: 30.29 KG/M2 | TEMPERATURE: 98.5 F | DIASTOLIC BLOOD PRESSURE: 64 MMHG | HEIGHT: 67 IN | HEART RATE: 86 BPM | OXYGEN SATURATION: 90 %

## 2018-10-22 DIAGNOSIS — R63.0 ANOREXIA: ICD-10-CM

## 2018-10-22 PROCEDURE — 99214 OFFICE O/P EST MOD 30 MIN: CPT

## 2018-10-24 ENCOUNTER — APPOINTMENT (OUTPATIENT)
Dept: COLORECTAL SURGERY | Facility: CLINIC | Age: 68
End: 2018-10-24

## 2018-10-24 PROCEDURE — 94640 AIRWAY INHALATION TREATMENT: CPT

## 2018-10-24 PROCEDURE — 74176 CT ABD & PELVIS W/O CONTRAST: CPT

## 2018-10-24 PROCEDURE — 97605 NEG PRS WND THER DME<=50SQCM: CPT

## 2018-10-24 PROCEDURE — 81001 URINALYSIS AUTO W/SCOPE: CPT

## 2018-10-24 PROCEDURE — 84100 ASSAY OF PHOSPHORUS: CPT

## 2018-10-24 PROCEDURE — 82962 GLUCOSE BLOOD TEST: CPT

## 2018-10-24 PROCEDURE — 97162 PT EVAL MOD COMPLEX 30 MIN: CPT

## 2018-10-24 PROCEDURE — 87040 BLOOD CULTURE FOR BACTERIA: CPT

## 2018-10-24 PROCEDURE — 85027 COMPLETE CBC AUTOMATED: CPT

## 2018-10-24 PROCEDURE — 71045 X-RAY EXAM CHEST 1 VIEW: CPT

## 2018-10-24 PROCEDURE — C1758: CPT

## 2018-10-24 PROCEDURE — 71046 X-RAY EXAM CHEST 2 VIEWS: CPT

## 2018-10-24 PROCEDURE — 82553 CREATINE MB FRACTION: CPT

## 2018-10-24 PROCEDURE — 83605 ASSAY OF LACTIC ACID: CPT

## 2018-10-24 PROCEDURE — 97116 GAIT TRAINING THERAPY: CPT

## 2018-10-24 PROCEDURE — 82330 ASSAY OF CALCIUM: CPT

## 2018-10-24 PROCEDURE — 87086 URINE CULTURE/COLONY COUNT: CPT

## 2018-10-24 PROCEDURE — 80053 COMPREHEN METABOLIC PANEL: CPT

## 2018-10-24 PROCEDURE — 83735 ASSAY OF MAGNESIUM: CPT

## 2018-10-24 PROCEDURE — 80162 ASSAY OF DIGOXIN TOTAL: CPT

## 2018-10-24 PROCEDURE — 97530 THERAPEUTIC ACTIVITIES: CPT

## 2018-10-24 PROCEDURE — 84484 ASSAY OF TROPONIN QUANT: CPT

## 2018-10-24 PROCEDURE — 93005 ELECTROCARDIOGRAM TRACING: CPT

## 2018-10-24 PROCEDURE — 97110 THERAPEUTIC EXERCISES: CPT

## 2018-10-24 PROCEDURE — C1889: CPT

## 2018-10-24 PROCEDURE — 87186 SC STD MICRODIL/AGAR DIL: CPT

## 2018-10-24 PROCEDURE — 82550 ASSAY OF CK (CPK): CPT

## 2018-10-24 PROCEDURE — 80048 BASIC METABOLIC PNL TOTAL CA: CPT

## 2018-10-24 PROCEDURE — 87070 CULTURE OTHR SPECIMN AEROBIC: CPT

## 2018-10-26 ENCOUNTER — APPOINTMENT (OUTPATIENT)
Dept: CARDIOTHORACIC SURGERY | Facility: CLINIC | Age: 68
End: 2018-10-26

## 2018-10-29 ENCOUNTER — APPOINTMENT (OUTPATIENT)
Dept: PULMONOLOGY | Facility: CLINIC | Age: 68
End: 2018-10-29

## 2018-10-31 ENCOUNTER — APPOINTMENT (OUTPATIENT)
Dept: HEART AND VASCULAR | Facility: CLINIC | Age: 68
End: 2018-10-31
Payer: MEDICARE

## 2018-10-31 VITALS
DIASTOLIC BLOOD PRESSURE: 70 MMHG | WEIGHT: 190 LBS | OXYGEN SATURATION: 95 % | TEMPERATURE: 98.2 F | HEART RATE: 83 BPM | BODY MASS INDEX: 29.82 KG/M2 | RESPIRATION RATE: 12 BRPM | SYSTOLIC BLOOD PRESSURE: 100 MMHG | HEIGHT: 67 IN

## 2018-10-31 DIAGNOSIS — R63.4 ABNORMAL WEIGHT LOSS: ICD-10-CM

## 2018-10-31 DIAGNOSIS — L29.9 PRURITUS, UNSPECIFIED: ICD-10-CM

## 2018-10-31 PROCEDURE — 90686 IIV4 VACC NO PRSV 0.5 ML IM: CPT

## 2018-10-31 PROCEDURE — G0008: CPT

## 2018-10-31 PROCEDURE — 99214 OFFICE O/P EST MOD 30 MIN: CPT | Mod: 25

## 2018-10-31 PROCEDURE — 36415 COLL VENOUS BLD VENIPUNCTURE: CPT

## 2018-11-01 LAB — TSH SERPL-ACNC: 1.57 UIU/ML

## 2018-11-02 LAB
FOLATE SERPL-MCNC: 6.4 NG/ML
VIT B12 SERPL-MCNC: 768 PG/ML

## 2018-11-05 LAB — UBIQUINONE10 SERPL-MCNC: 0.49 MG/L

## 2018-11-05 NOTE — HISTORY OF PRESENT ILLNESS
[FreeTextEntry1] : eating less,  significant weight loss reported \par \par left inpatient rehab 3 weeks ago \par \par Recent vaginal boil lanced and drained  and completed  Bactrim DS one po bid for 7 days \par \par No fevers, cough resolved \par \par On tapering prednisone for adrenal insufficiency \par \par Denies bleeding from any orifices

## 2018-11-05 NOTE — ASSESSMENT
[FreeTextEntry1] : weight loss \par \par type 2 diabetes\par \par aortic regurgitation\par \par mild anemia \par

## 2018-11-05 NOTE — DISCUSSION/SUMMARY
[Paroxysmal Atrial Fibrillation] : paroxysmal atrial fibrillation [Stable] : stable [Rate Control] : rate control [Anticoagulation] : anticoagulation [With Me] : with me [FreeTextEntry1] : Advised increasing hydration \par \par venipuncture performed with  B 12, folate, TSH  and Co-Q-10 levels \par \par High dose flu vaccine administered right deltoid \par \par slow prednisone taper \par \par

## 2018-11-05 NOTE — REVIEW OF SYSTEMS
[Feeling Fatigued] : feeling fatigued [Recent Weight Loss (___ Lbs)] : recent [unfilled] ~Ulb weight loss [Dyspnea on exertion] : dyspnea during exertion [Cough] : cough [Wheezing] : no wheezing [Coughing Up Blood] : no hemoptysis [Negative] : Heme/Lymph

## 2018-11-05 NOTE — REASON FOR VISIT
[Follow-Up - Clinic] : a clinic follow-up of [Anticoagulation] : anticoagulation [Atrial Fibrillation] : atrial fibrillation [Dyspnea] : dyspnea [Fatigue] : feeling tired (fatigue) [FreeTextEntry1] : 67 yo female with hx of seizure ,  hx of rectal cancer s/p recent completion of radiation and chemotherapy and  + PET scan showing residual disease  which necessitated  surgery in July 2018 . Residual rectal cancer removed with  all 14 lymph nodes negative for disease. She now has an ostomy bag. Brain MRI was negative for metastatic disease. \par \par Chronic asthma with respiratory infections and tracheomalacia that was surgically repaired last year . Has nonvalvular afib and is on Eliquis. \par \par There is  hx of moderate -severe chronic aortic regurgitation. \par \par  [Family Member] : family member

## 2018-11-15 ENCOUNTER — APPOINTMENT (OUTPATIENT)
Dept: PULMONOLOGY | Facility: CLINIC | Age: 68
End: 2018-11-15

## 2018-11-20 ENCOUNTER — OUTPATIENT (OUTPATIENT)
Dept: OUTPATIENT SERVICES | Facility: HOSPITAL | Age: 68
LOS: 1 days | Discharge: HOME | End: 2018-11-20

## 2018-11-20 DIAGNOSIS — Z98.890 OTHER SPECIFIED POSTPROCEDURAL STATES: Chronic | ICD-10-CM

## 2018-11-20 DIAGNOSIS — K62.5 HEMORRHAGE OF ANUS AND RECTUM: Chronic | ICD-10-CM

## 2018-11-20 DIAGNOSIS — Z96.653 PRESENCE OF ARTIFICIAL KNEE JOINT, BILATERAL: Chronic | ICD-10-CM

## 2018-11-20 DIAGNOSIS — Z98.89 OTHER SPECIFIED POSTPROCEDURAL STATES: Chronic | ICD-10-CM

## 2018-11-20 DIAGNOSIS — C18.9 MALIGNANT NEOPLASM OF COLON, UNSPECIFIED: ICD-10-CM

## 2018-11-20 DIAGNOSIS — H05.352 EXOSTOSIS OF LEFT ORBIT: Chronic | ICD-10-CM

## 2018-11-20 DIAGNOSIS — Z87.09 PERSONAL HISTORY OF OTHER DISEASES OF THE RESPIRATORY SYSTEM: Chronic | ICD-10-CM

## 2018-11-21 ENCOUNTER — NON-APPOINTMENT (OUTPATIENT)
Age: 68
End: 2018-11-21

## 2018-11-21 ENCOUNTER — APPOINTMENT (OUTPATIENT)
Dept: PULMONOLOGY | Facility: CLINIC | Age: 68
End: 2018-11-21
Payer: MEDICARE

## 2018-11-21 VITALS
BODY MASS INDEX: 21.56 KG/M2 | SYSTOLIC BLOOD PRESSURE: 120 MMHG | DIASTOLIC BLOOD PRESSURE: 70 MMHG | WEIGHT: 139 LBS | OXYGEN SATURATION: 93 % | HEART RATE: 96 BPM | RESPIRATION RATE: 16 BRPM | HEIGHT: 67.5 IN

## 2018-11-21 PROCEDURE — 71046 X-RAY EXAM CHEST 2 VIEWS: CPT

## 2018-11-21 PROCEDURE — 99214 OFFICE O/P EST MOD 30 MIN: CPT | Mod: 25

## 2018-11-21 PROCEDURE — 96372 THER/PROPH/DIAG INJ SC/IM: CPT

## 2018-11-21 RX ORDER — OMALIZUMAB 202.5 MG/1.4ML
150 INJECTION, SOLUTION SUBCUTANEOUS
Qty: 45 | Refills: 0 | Status: COMPLETED | OUTPATIENT
Start: 2018-11-21

## 2018-11-21 RX ORDER — OMALIZUMAB 202.5 MG/1.4ML
150 INJECTION, SOLUTION SUBCUTANEOUS
Qty: 45 | Refills: 0 | Status: COMPLETED | OUTPATIENT
Start: 2018-07-18

## 2018-11-21 RX ADMIN — OMALIZUMAB 1.5 MG: 202.5 INJECTION, SOLUTION SUBCUTANEOUS at 00:00

## 2018-11-21 NOTE — ASSESSMENT
[FreeTextEntry1] : Ms. Bloom is a 67 year old female with a history of AVDz, asthma, allergy / GERD / TBM / s/p pneumonia, who presents now for a follow up post-op pulmonary evaluation. \par \par Her chronic SOB which is multifactorial due to:\par - tracheomalacia s/p tracheoplasty with residual frequent mucus production, though patient is non-compliant with vest therapy \par - asthmatic symptoms. \par - chronic bronchitis\par - asthma\par - allergies rhinitis\par - GERD\par - poor breathing mechanics \par - CAD/AV disease\par \par problem 1: s/p PNA\par -improved - clinically and radiographically\par \par Your chest Xray/CT reveals an infiltrate c/w pneumonia; this based on your clinical scenario required additional therapy. Any change in your status will require hospitalization at the nearest hospital and al local ambulance will need to be called. Our group is on staff at [facility] as- consultants. The patient/family is instructed to notify our office with any change in condition. \par \par problem 2: asthmatic bronchitis\par - Medrol dose pac 16 mg BID for 5 days then it will be cut to 24 mg QD 16 mg for 4 days \par \par -continue to use albuterol via the nebulizer QID \par -followed by Mucomyst QiD\par -followed by the acapella device/ chest vest therapy \par -followed by Perforomist via the nebulizer BID\par -followed by Budesonide via the nebulizer BID \par -continue to use Breo Ellipta 200 at 1 inhalation QD \par -continue to use Spiriva 1 inhalation QD\par -continue to use Xolair 225 injection; follow up injections every 2 weeks- she is to continue on this while having chemotherapy, given today in the LUE\par -continue to use Accolate 20 mg BID\par -continue Medrol 4 mg to continue \par -Information sheet given about prednisone to the patient to be reviewed, this medication is never to be used without consulting the prescribing physician. Proper dietary restraint is necessary specifically salt containing foods, if any reaction may occur should be reported. \par -Asthma is believed to be caused by inherited (genetic) and environmental factor, but its exact cause is unknown. Asthma may be triggered by allergens, lung infections, or irritants in the air. Asthma triggers are different for each person\par -Inhaler technique reviewed as well as oral hygiene techniques reviewed with patient. Avoidance of cold air, extremes of temperature, rescue inhaler should be used before exercise. Order of medication reviewed with patient. Recommended use of a cool mist humidifier in the bedroom. \par \par problem 3: tracheomalacia, residual bronchomalacia \par -s/p tracheoplasty with Dr. [place] Zapien\par -continue to follow up \par \par problem 4: chronic bronchitis and mucus clearance\par -continue to use acapella device multiple times daily\par -recommended to use chest vest therapy multiple times daily \par -she is being sent for sputum cultures \par Patient has a chronic cough greater than 6 months, tried and failed manual chest physiotherapy at home, no skilled caregiver available at home to perform manual CPT, tried and failed acapella vibratory physiotherapy, and recommended chest vest therapy \par \par problem 5: GERD\par -continue to use Protonix 40 mg before breakfast\par -continue Baclofen 10 mg Qmeal/QHS\par -Rule of 2s: avoid eating too much, eating too late, eating too spicy, eating two hours before bed\par -Things to avoid including overeating, spicy foods, tight clothing, eating within three hours of bed, this list is not all inclusive. \par -For treatment of reflux, possible options discussed including diet control, H2 blockers, PPIs, as well as coating motility agents discussed as treatment options. Timing of meals and proximity of last meal to sleep were discussed. If symptoms persist, a formal gastrointestinal evaluation is needed. \par \par problem 6: allergic rhinitis \par -continue to use nasal saline\par -continue to use Xyzal 5 mg before bed\par -Environmental measures for allergies were encouraged including mattress and pillow cover, air purifier, and environmental controls. \par \par problem 7: hx of abnormal aortic valve\par -continue to follow up with Dr. Leahy, AV Disease\par -echocadiogram in June 2018  (Tosin)\par \par problem 8: colon cancer\par -s/p radiation therapy, surgery \par -continue to use chemotherapy follow up with Dr. King or Dr. Mario\par \par problem 9: poor breathing mechanics\par -Proper breathing techniques were reviewed with an emphasis of exhalation. Patient instructed to breath in for 1 second and out for four seconds. Patient was encouraged to not talk while walking.\par \par problem 10: immunodeficiency\par - -Due to the fact that this pt has had more infections than would be expected and immunological blood work is indicated this would include: IgG subclasses, quantitative immunoglobulins, Strep pneumoniae titers as well as Vitamin D levels. Based on this blood work we will be able to decide where the pt needs additional pneumococcal vaccine either Prevnar 13 or pneumovax. Immunology evaluation will also be potentially indicated.\par \par problem 11: cardiac health\par -recommended to continue to f/u with cardiologist\par \par problem 12: r/o immunodeficiency (on Medrol)\par -Due to the fact that this pt has had more infections than would be expected and immunological blood work is indicated this would include: IgG subclasses, quantitative immunoglobulins, Strep pneumoniae titers as well as Vitamin D levels.\par -Based on this blood work we will be able to decide where the pt needs additional pneumococcal vaccine either Prevnar 13 or pneumovax. Immunology evaluation will also be potentially indicated. \par \par problem 13: abnormal chest CT\par -questionable DIEUDONNE or HERMELINDO, all sputum is negative \par \par problem 14: pulmonary clearance\par -at this point in time there are no absolute pulmonary contraindications to go forward with the planned procedure -anal squamous cell carcinoma\par -at the time of surgery she should have optimal pain control, incentive spirometry, early ambulation, DVT and GI prophylaxis.\par \par Problem 15: Sensory Neuropathic cough \par - Add Amitriptyline 10 mg QHS for the first weeks then up to TID\par problem 15:  health maintenance \par -recommended yearly flu shot after October 15\par -recommended strep pneumonia vaccines: Prevnar-13 vaccine, followed by Pneumo vaccine 23 one year following\par -recommended early intervention for URIs\par -recommended regular osteoporosis evaluations\par -recommended early dermatological evaluations\par -recommended after the age of 50 to the age of 70, colonoscopy every 5 years \par \par \par F/U in 6 weeks \par She is encouraged to call with any changes, concerns, or questions.

## 2018-11-21 NOTE — REASON FOR VISIT
[Family Member] : family member [Acute] : an acute visit [FreeTextEntry1] : Bronchitis/ Asthmatic bronchitis

## 2018-11-21 NOTE — PROCEDURE
[FreeTextEntry1] : CXR revealed a normal sized heart; there was no evidence of infiltrate or effusion, some scarring at the right base.

## 2018-11-21 NOTE — ADDENDUM
[FreeTextEntry1] : Documented by Jigar Estrada acting as a scribe for Dr. Eulalio Allison on 11/21/18\par \par All medical record entries made by the Scribe were at my, Dr. Eulalio Allison's, direction and personally dictated by me on 11/21/18. I have reviewed the chart and agree that the record accurately reflects my personal performance of the history, physical exam, assessment and plan. I have also personally directed, reviewed, and agree with the discharge instructions. \par \par \par \par \par

## 2018-11-21 NOTE — HISTORY OF PRESENT ILLNESS
[FreeTextEntry1] : Ms. Bloom is a 68 year old female with a history of allergic rhinitis, asthma, bronchiectasis, GERD, COPD, inflammation of lung, obesity, s/p tracheoplasty, SOB and TBM presenting to the office today for a sick visit. Her chief complaint is cough\par - She has been feeling SOB while ambulating any distance\par - She is coughing and bringing up thick sputum. She states that she has to cough 2-3 times in order to bring up sputum \par - She is using her chest vest. She reports that it sometimes that it feels that nothing is moving up. \par - She complains of chest pressure\par - She reports that she is wheezing. \par - She feels like her lungs are shot in the morning due to persistent coughing.\par - She believes that she has a PND \par - She is feeling more SOB. \par - She states that if she speaks for too long she becomes SOB

## 2018-11-21 NOTE — PHYSICAL EXAM

## 2018-11-26 ENCOUNTER — APPOINTMENT (OUTPATIENT)
Dept: INFUSION THERAPY | Facility: HOSPITAL | Age: 68
End: 2018-11-26

## 2018-12-04 ENCOUNTER — APPOINTMENT (OUTPATIENT)
Dept: PULMONOLOGY | Facility: CLINIC | Age: 68
End: 2018-12-04
Payer: MEDICARE

## 2018-12-04 ENCOUNTER — APPOINTMENT (OUTPATIENT)
Dept: WOUND CARE | Facility: HOSPITAL | Age: 68
End: 2018-12-04
Payer: MEDICARE

## 2018-12-04 VITALS
DIASTOLIC BLOOD PRESSURE: 70 MMHG | BODY MASS INDEX: 22.44 KG/M2 | WEIGHT: 143 LBS | RESPIRATION RATE: 17 BRPM | HEART RATE: 92 BPM | SYSTOLIC BLOOD PRESSURE: 132 MMHG | OXYGEN SATURATION: 95 % | HEIGHT: 67 IN

## 2018-12-04 DIAGNOSIS — Z86.79 PERSONAL HISTORY OF OTHER DISEASES OF THE CIRCULATORY SYSTEM: ICD-10-CM

## 2018-12-04 DIAGNOSIS — Z86.39 PERSONAL HISTORY OF OTHER ENDOCRINE, NUTRITIONAL AND METABOLIC DISEASE: ICD-10-CM

## 2018-12-04 DIAGNOSIS — Z87.2 PERSONAL HISTORY OF DISEASES OF THE SKIN AND SUBCUTANEOUS TISSUE: ICD-10-CM

## 2018-12-04 DIAGNOSIS — L29.3 ANOGENITAL PRURITUS, UNSPECIFIED: ICD-10-CM

## 2018-12-04 PROCEDURE — 96372 THER/PROPH/DIAG INJ SC/IM: CPT

## 2018-12-04 PROCEDURE — 99213 OFFICE O/P EST LOW 20 MIN: CPT

## 2018-12-04 RX ORDER — OMALIZUMAB 202.5 MG/1.4ML
150 INJECTION, SOLUTION SUBCUTANEOUS
Qty: 45 | Refills: 0 | Status: COMPLETED | OUTPATIENT
Start: 2018-12-04

## 2018-12-04 RX ADMIN — OMALIZUMAB 1.5 MG: 202.5 INJECTION, SOLUTION SUBCUTANEOUS at 00:00

## 2018-12-06 PROBLEM — Z87.2 HISTORY OF SEBORRHEIC KERATOSIS: Status: RESOLVED | Noted: 2017-07-13 | Resolved: 2018-12-06

## 2018-12-06 PROBLEM — Z86.39 HISTORY OF ADRENAL INSUFFICIENCY: Status: RESOLVED | Noted: 2017-04-05 | Resolved: 2018-12-06

## 2018-12-06 PROBLEM — L29.3 PERINEAL IRRITATION: Status: ACTIVE | Noted: 2018-12-06

## 2018-12-11 ENCOUNTER — RESULT REVIEW (OUTPATIENT)
Age: 68
End: 2018-12-11

## 2018-12-11 ENCOUNTER — LABORATORY RESULT (OUTPATIENT)
Age: 68
End: 2018-12-11

## 2018-12-11 ENCOUNTER — APPOINTMENT (OUTPATIENT)
Dept: HEMATOLOGY ONCOLOGY | Facility: CLINIC | Age: 68
End: 2018-12-11
Payer: MEDICARE

## 2018-12-11 ENCOUNTER — APPOINTMENT (OUTPATIENT)
Dept: INFUSION THERAPY | Facility: HOSPITAL | Age: 68
End: 2018-12-11

## 2018-12-11 LAB
BASOPHILS # BLD AUTO: 0 K/UL — SIGNIFICANT CHANGE UP (ref 0–0.2)
BASOPHILS NFR BLD AUTO: 0.4 % — SIGNIFICANT CHANGE UP (ref 0–2)
EOSINOPHIL # BLD AUTO: 0.5 K/UL — SIGNIFICANT CHANGE UP (ref 0–0.5)
EOSINOPHIL NFR BLD AUTO: 6 % — SIGNIFICANT CHANGE UP (ref 0–6)
HCT VFR BLD CALC: 38.4 % — SIGNIFICANT CHANGE UP (ref 34.5–45)
HGB BLD-MCNC: 11.6 G/DL — SIGNIFICANT CHANGE UP (ref 11.5–15.5)
LYMPHOCYTES # BLD AUTO: 1.6 K/UL — SIGNIFICANT CHANGE UP (ref 1–3.3)
LYMPHOCYTES # BLD AUTO: 19.6 % — SIGNIFICANT CHANGE UP (ref 13–44)
MCHC RBC-ENTMCNC: 21.4 PG — LOW (ref 27–34)
MCHC RBC-ENTMCNC: 30.2 G/DL — LOW (ref 32–36)
MCV RBC AUTO: 70.8 FL — LOW (ref 80–100)
MONOCYTES # BLD AUTO: 0.8 K/UL — SIGNIFICANT CHANGE UP (ref 0–0.9)
MONOCYTES NFR BLD AUTO: 9.4 % — SIGNIFICANT CHANGE UP (ref 2–14)
NEUTROPHILS # BLD AUTO: 5.3 K/UL — SIGNIFICANT CHANGE UP (ref 1.8–7.4)
NEUTROPHILS NFR BLD AUTO: 64.6 % — SIGNIFICANT CHANGE UP (ref 43–77)
PLATELET # BLD AUTO: 254 K/UL — SIGNIFICANT CHANGE UP (ref 150–400)
RBC # BLD: 5.43 M/UL — HIGH (ref 3.8–5.2)
RBC # FLD: 19.1 % — HIGH (ref 10.3–14.5)
WBC # BLD: 8.2 K/UL — SIGNIFICANT CHANGE UP (ref 3.8–10.5)
WBC # FLD AUTO: 8.2 K/UL — SIGNIFICANT CHANGE UP (ref 3.8–10.5)

## 2018-12-11 PROCEDURE — 99215 OFFICE O/P EST HI 40 MIN: CPT

## 2018-12-11 NOTE — CONSULT LETTER
[Dear  ___] : Dear  [unfilled], [Courtesy Letter:] : I had the pleasure of seeing your patient, [unfilled], in my office today. [Please see my note below.] : Please see my note below. [Sincerely,] : Sincerely, [DrShreya  ___] : Dr. MANZO [DrShreya ___] : Dr. MANZO

## 2018-12-11 NOTE — PHYSICAL EXAM
[Capable of only limited self care, confined to bed or chair more than 50% of waking hours] : Status 3- Capable of only limited self care, confined to bed or chair more than 50% of waking hours [Normal] : affect appropriate [de-identified] : Left eye enucleation [de-identified] : mild wheeze; upper airway sounds transmitted.  [FreeTextEntry1] : anal healing is progressing; Stoma is functioning well. [de-identified] : non-focal

## 2018-12-11 NOTE — HISTORY OF PRESENT ILLNESS
[de-identified] : Patient developed bleeding AUG 2016 from rectum. A colonoscopy in MAR 2016 was unrevealing. It was thought to be a hemorrhoid in the past. Her HGB began to drop. She was examined She called PCP and referred to GI surgeon (Dr. Magallanes). A mass was found and biopsy showed anal cancer, squamous cell. A PET scan did not reveal any local or metastatic disease. She received Mitomycin on 5/3/2017 (dose #2 mitomycin on 6/1/2017) and Xeloda. Radiation 5/3/17 to 6/16/17 at 5400 cGy. \par \par Patient also with h/o adrenal insufficiency after 50 years of steroids for asthma and tracheomalacia (mesh placed 10/2016, Dr. Mcclellan at [facility]). She has diabetes that requires insulin, without sequelae on the kidney or eye. 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 Will continue to monitor. \par Breast screening: She had breast mammogram and US in 2017 found 2 lesions, and both benign. Rt breast - "benign, nonproliferative fibrocystic changes"; Lt Breast - "fibroadenoma".  Due for repeat 2/2018.\par Cardiac: Will be undergoing SAFIA (Dr. Leahy) for re-evaluation of aortic valve, and bronchoscopy (Dr. Mcclellan). Her valve showed moderate to severe AR.\par \par 5/29/2018: Patient has local relapse in anus proved by biopsy in February 2018. It is again SCC and HPV / p16 is positive. PET 1/2018 showed no metastatic disease. Colonoscopy 2/2018 showed no colonic lesion, only anal mass that is palpable by ARIAS. Patient is deemed not a surgical candidate for APR due to pulmonary and cardiac risk. She completed second course of radiotherapy in early 4/2018 with Dr. Amanda Burger. She was recently hospitalized for high fever. No clear source - blood and urine cultures were negative. The CAT scan showed minor opacities that were not consistent with pnuemonia. She was admitted for 5 days and given IV antibiotics empirically. She now has weakness in legs and uses walker for balance. She will be getting a home rehabilitation visit soon. Does feel lightheaded at times.\par \par 12/11/2018: Patient underwent APR surgery on 7/24/2018. Pathology post-operatively is [id number]; squamous cell cancer. She is back with her sister. Still requires daily wound care with topical and witch hazel. There is consideration of hyperbaric. There is a concern about her lung disease in that she might not be able to tolerate hyperbaric.  [de-identified] : Pulmonary: Eulalio Allison  [phone]\par GI surgeon: Terrell Luong; [phone]\par PCP: Anastasiya Jin [phone] \par Cardiologist: Steven Leahy [phone]\par Radiation Oncology: Amanda Burger and Allie Mart\par \par [facility] doctors: \par PCP/Cardiology: Jordi Engel\par Thoracic Surgeon: Zohaib Zapien

## 2018-12-12 ENCOUNTER — FORM ENCOUNTER (OUTPATIENT)
Age: 68
End: 2018-12-12

## 2018-12-13 ENCOUNTER — APPOINTMENT (OUTPATIENT)
Dept: HEMATOLOGY ONCOLOGY | Facility: CLINIC | Age: 68
End: 2018-12-13
Payer: MEDICARE

## 2018-12-13 ENCOUNTER — APPOINTMENT (OUTPATIENT)
Dept: CT IMAGING | Facility: IMAGING CENTER | Age: 68
End: 2018-12-13
Payer: MEDICARE

## 2018-12-13 ENCOUNTER — OUTPATIENT (OUTPATIENT)
Dept: OUTPATIENT SERVICES | Facility: HOSPITAL | Age: 68
LOS: 1 days | End: 2018-12-13
Payer: MEDICARE

## 2018-12-13 DIAGNOSIS — Z98.89 OTHER SPECIFIED POSTPROCEDURAL STATES: Chronic | ICD-10-CM

## 2018-12-13 DIAGNOSIS — C21.1 MALIGNANT NEOPLASM OF ANAL CANAL: ICD-10-CM

## 2018-12-13 DIAGNOSIS — Z98.890 OTHER SPECIFIED POSTPROCEDURAL STATES: Chronic | ICD-10-CM

## 2018-12-13 DIAGNOSIS — H05.352 EXOSTOSIS OF LEFT ORBIT: Chronic | ICD-10-CM

## 2018-12-13 DIAGNOSIS — Z87.09 PERSONAL HISTORY OF OTHER DISEASES OF THE RESPIRATORY SYSTEM: Chronic | ICD-10-CM

## 2018-12-13 DIAGNOSIS — K62.5 HEMORRHAGE OF ANUS AND RECTUM: Chronic | ICD-10-CM

## 2018-12-13 DIAGNOSIS — Z96.653 PRESENCE OF ARTIFICIAL KNEE JOINT, BILATERAL: Chronic | ICD-10-CM

## 2018-12-13 PROCEDURE — 71260 CT THORAX DX C+: CPT

## 2018-12-13 PROCEDURE — 74177 CT ABD & PELVIS W/CONTRAST: CPT | Mod: 26

## 2018-12-13 PROCEDURE — 99212 OFFICE O/P EST SF 10 MIN: CPT

## 2018-12-13 PROCEDURE — 71260 CT THORAX DX C+: CPT | Mod: 26

## 2018-12-13 PROCEDURE — 74177 CT ABD & PELVIS W/CONTRAST: CPT

## 2018-12-18 ENCOUNTER — APPOINTMENT (OUTPATIENT)
Dept: PULMONOLOGY | Facility: CLINIC | Age: 68
End: 2018-12-18
Payer: MEDICARE

## 2018-12-18 VITALS
OXYGEN SATURATION: 95 % | DIASTOLIC BLOOD PRESSURE: 68 MMHG | RESPIRATION RATE: 17 BRPM | HEART RATE: 82 BPM | WEIGHT: 143 LBS | HEIGHT: 67 IN | BODY MASS INDEX: 22.44 KG/M2 | SYSTOLIC BLOOD PRESSURE: 116 MMHG

## 2018-12-18 PROCEDURE — 96372 THER/PROPH/DIAG INJ SC/IM: CPT

## 2018-12-18 RX ORDER — OMALIZUMAB 202.5 MG/1.4ML
150 INJECTION, SOLUTION SUBCUTANEOUS
Qty: 45 | Refills: 0 | Status: COMPLETED | OUTPATIENT
Start: 2018-12-18

## 2018-12-18 RX ADMIN — OMALIZUMAB 0 MG: 202.5 INJECTION, SOLUTION SUBCUTANEOUS at 00:00

## 2018-12-20 ENCOUNTER — APPOINTMENT (OUTPATIENT)
Dept: PULMONOLOGY | Facility: CLINIC | Age: 68
End: 2018-12-20
Payer: MEDICARE

## 2018-12-20 ENCOUNTER — NON-APPOINTMENT (OUTPATIENT)
Age: 68
End: 2018-12-20

## 2018-12-20 VITALS
SYSTOLIC BLOOD PRESSURE: 124 MMHG | OXYGEN SATURATION: 96 % | HEART RATE: 101 BPM | RESPIRATION RATE: 17 BRPM | BODY MASS INDEX: 22.44 KG/M2 | HEIGHT: 67 IN | WEIGHT: 143 LBS | DIASTOLIC BLOOD PRESSURE: 70 MMHG

## 2018-12-20 PROCEDURE — 99214 OFFICE O/P EST MOD 30 MIN: CPT | Mod: 25

## 2018-12-20 PROCEDURE — 94010 BREATHING CAPACITY TEST: CPT

## 2018-12-20 RX ORDER — METHYLPREDNISOLONE 4 MG/1
4 TABLET ORAL DAILY
Qty: 1 | Refills: 0 | Status: DISCONTINUED | COMMUNITY
Start: 2017-08-03 | End: 2018-12-20

## 2018-12-20 RX ORDER — CEFUROXIME AXETIL 500 MG/1
500 TABLET ORAL
Qty: 14 | Refills: 0 | Status: DISCONTINUED | COMMUNITY
Start: 2018-12-11 | End: 2018-12-20

## 2018-12-20 RX ORDER — SULFAMETHOXAZOLE AND TRIMETHOPRIM 400; 80 MG/1; MG/1
400-80 TABLET ORAL TWICE DAILY
Qty: 20 | Refills: 0 | Status: DISCONTINUED | COMMUNITY
Start: 2018-11-21 | End: 2018-12-20

## 2018-12-20 NOTE — ADDENDUM
[FreeTextEntry1] : Documented by Jigar Estrada acting as a scribe for Dr. Eulalio Allison on 12/20/18\par \par All medical record entries made by the Scribe were at my, Dr. Eulalio Allison's, direction and personally dictated by me on 12/20/18. I have reviewed the chart and agree that the record accurately reflects my personal performance of the history, physical exam, assessment and plan. I have also personally directed, reviewed, and agree with the discharge instructions. \par \par \par \par \par

## 2018-12-20 NOTE — REASON FOR VISIT
[Follow-Up] : a follow-up visit [FreeTextEntry1] :  allergic rhinitis, asthma, bronchiectasis, GERD, COPD, inflammation of lung, obesity, s/p tracheoplasty, SOB and TBM

## 2018-12-20 NOTE — PROCEDURE
[FreeTextEntry1] : PFT- spi reveals mild restrictive and severe obstructive dysfunction; FEV1 was 0.80L which is 38% of predicted; normal flow volume loop \par \par \par She had a CT chest scan performed on 12/13/2018, which showed that there is improvement of right lower lobe consolidation with residual reticular nodular and groundglass opacity in the right lower and middle lobes, favored to be inflammatory in etiology. An indeterminate nodular opacity along the dome of the right hemidiaphragm measures 10mm, and is new compared to prior exam.

## 2018-12-20 NOTE — ASSESSMENT
[FreeTextEntry1] : Ms. Bloom is a 67 year old female with a history of AVDz, asthma, allergy, GERD, TBM, s/p pneumonia, who presents now for a follow up post-op pulmonary evaluation. \par \par Her chronic SOB which is multifactorial due to:\par - tracheomalacia s/p tracheoplasty with residual frequent mucus production, though patient is non-compliant with vest therapy \par - asthmatic symptoms. \par - chronic bronchitis\par - asthma\par - allergies rhinitis\par - GERD\par - poor breathing mechanics \par - CAD/AV disease\par \par problem 1: s/p PNA\par -improved - clinically and radiographically\par \par Your chest Xray/CT reveals an infiltrate c/w pneumonia; this based on your clinical scenario required additional therapy. Any change in your status will require hospitalization at the nearest hospital and al local ambulance will need to be called. Our group is on staff at [facility] and [facility] as- consultants. The patient/family is instructed to notify our office with any change in condition. \par \par problem 2: asthmatic bronchitis\par - Medrol boost 16 mg BID for 5 days then it will be cut to 24 mg QD for 4 days, 16 mg for 4 days, and 8 mg for 4 days then return to 4 mg \par \par -continue to use albuterol via the nebulizer QID \par -followed by Mucomyst QiD\par -followed by the acapella device/ chest vest therapy \par -followed by Perforomist via the nebulizer BID\par -followed by Budesonide via the nebulizer BID \par -continue to use Breo Ellipta 200 at 1 inhalation QD \par -continue to use Spiriva 1 inhalation QD\par -continue to use Xolair 225 injection; follow up injections every 2 weeks- she is to continue on this while having chemotherapy, given today in the LUE\par -continue to use Accolate 20 mg BID\par -continue Medrol 4 mg to continue \par -Information sheet given about prednisone to the patient to be reviewed, this medication is never to be used without consulting the prescribing physician. Proper dietary restraint is necessary specifically salt containing foods, if any reaction may occur should be reported. \par -Asthma is believed to be caused by inherited (genetic) and environmental factor, but its exact cause is unknown. Asthma may be triggered by allergens, lung infections, or irritants in the air. Asthma triggers are different for each person\par -Inhaler technique reviewed as well as oral hygiene techniques reviewed with patient. Avoidance of cold air, extremes of temperature, rescue inhaler should be used before exercise. Order of medication reviewed with patient. Recommended use of a cool mist humidifier in the bedroom. \par \par problem 3: tracheomalacia, residual bronchomalacia \par -s/p tracheoplasty with Dr. Mt Zapien\par -continue to follow up \par \par problem 4: chronic bronchitis and mucus clearance\par -continue to use acapella device multiple times daily\par -recommended to use chest vest therapy multiple times daily \par -she is being sent for sputum cultures \par Patient has a chronic cough greater than 6 months, tried and failed manual chest physiotherapy at home, no skilled caregiver available at home to perform manual CPT, tried and failed acapella vibratory physiotherapy, and recommended chest vest therapy \par \par problem 5: GERD\par -continue to use Protonix 40 mg before breakfast\par -continue Baclofen 10 mg Qmeal/QHS\par -Rule of 2s: avoid eating too much, eating too late, eating too spicy, eating two hours before bed\par -Things to avoid including overeating, spicy foods, tight clothing, eating within three hours of bed, this list is not all inclusive. \par -For treatment of reflux, possible options discussed including diet control, H2 blockers, PPIs, as well as coating motility agents discussed as treatment options. Timing of meals and proximity of last meal to sleep were discussed. If symptoms persist, a formal gastrointestinal evaluation is needed. \par \par problem 6: allergic rhinitis \par -continue to use nasal saline\par -continue to use Xyzal 5 mg before bed\par -Environmental measures for allergies were encouraged including mattress and pillow cover, air purifier, and environmental controls. \par \par problem 7: hx of abnormal aortic valve\par -continue to follow up with Dr. Leahy, AV Disease\par -echocadiogram in June 2018  (Tosin)\par \par problem 8: colon cancer\par -s/p radiation therapy, surgery \par -continue to use chemotherapy follow up with Dr. King or Dr. Mario\par \par problem 9: poor breathing mechanics\par -Proper breathing techniques were reviewed with an emphasis of exhalation. Patient instructed to breath in for 1 second and out for four seconds. Patient was encouraged to not talk while walking.\par \par problem 10: immunodeficiency\par - -Due to the fact that this pt has had more infections than would be expected and immunological blood work is indicated this would include: IgG subclasses, quantitative immunoglobulins, Strep pneumoniae titers as well as Vitamin D levels. Based on this blood work we will be able to decide where the pt needs additional pneumococcal vaccine either Prevnar 13 or pneumovax. Immunology evaluation will also be potentially indicated.\par \par problem 11: cardiac health\par -recommended to continue to f/u with cardiologist\par \par problem 12: r/o immunodeficiency (on Medrol)\par -Due to the fact that this pt has had more infections than would be expected and immunological blood work is indicated this would include: IgG subclasses, quantitative immunoglobulins, Strep pneumoniae titers as well as Vitamin D levels.\par -Based on this blood work we will be able to decide where the pt needs additional pneumococcal vaccine either Prevnar 13 or pneumovax. Immunology evaluation will also be potentially indicated. \par \par problem 13: abnormal chest CT- ? 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 \par - Continue  Amitriptyline 10 mg QHS for the first weeks then up to TID\par \par problem 15:  health maintenance \par -recommended yearly flu shot after October 15\par -recommended strep pneumonia vaccines: Prevnar-13 vaccine, followed by Pneumo vaccine 23 one year following\par -recommended early intervention for URIs\par -recommended regular osteoporosis evaluations\par -recommended early dermatological evaluations\par -recommended after the age of 50 to the age of 70, colonoscopy every 5 years \par \par \par F/U in 6 weeks \par She is encouraged to call with any changes, concerns, or questions.

## 2018-12-20 NOTE — HISTORY OF PRESENT ILLNESS
[FreeTextEntry1] : Ms. Bloom is a 68 year old female with a history of allergic rhinitis, asthma, bronchiectasis, GERD, COPD, inflammation of lung, obesity, s/p tracheoplasty, SOB and TBM presenting to the office today for a follow up visit. Her chief complaint is cough. \par - She comes in stating that she has been wheezing \par - She has not returned to her baseline since having PNA\par - She has been having pains in her shoulders and jaw due to coughing\par - She is constantly coughing\par - she is not using the vest because she is producing sputum without it. \par - On Friday, she was slightly febrile\par - She notes having vomited and brought up bile\par - She was burping. \par - She reports some palpitations. \par - She has been having a runny nose, which is unusual for her \par - She believes that she is sleeping well. \par - She expresses frustration with her chronic cough\par -  She denies any headaches, nausea, chills, sweats, chest pain, chest pressure, fatigue, diarrhea, constipation, dysphagia, myalgias, dizziness, leg swelling, leg pain, itchy eyes, itchy ears, heartburn, reflux, or sour taste in the mouth.

## 2019-01-03 ENCOUNTER — APPOINTMENT (OUTPATIENT)
Dept: PULMONOLOGY | Facility: CLINIC | Age: 69
End: 2019-01-03
Payer: MEDICARE

## 2019-01-03 VITALS
SYSTOLIC BLOOD PRESSURE: 132 MMHG | HEIGHT: 67 IN | OXYGEN SATURATION: 98 % | HEART RATE: 83 BPM | WEIGHT: 146 LBS | BODY MASS INDEX: 22.91 KG/M2 | DIASTOLIC BLOOD PRESSURE: 66 MMHG | RESPIRATION RATE: 17 BRPM

## 2019-01-03 PROCEDURE — 94729 DIFFUSING CAPACITY: CPT

## 2019-01-03 PROCEDURE — 96372 THER/PROPH/DIAG INJ SC/IM: CPT | Mod: 59

## 2019-01-03 PROCEDURE — 94727 GAS DIL/WSHOT DETER LNG VOL: CPT

## 2019-01-03 PROCEDURE — 94060 EVALUATION OF WHEEZING: CPT

## 2019-01-03 PROCEDURE — 99214 OFFICE O/P EST MOD 30 MIN: CPT | Mod: 25

## 2019-01-03 RX ORDER — OMALIZUMAB 202.5 MG/1.4ML
150 INJECTION, SOLUTION SUBCUTANEOUS
Qty: 45 | Refills: 0 | Status: COMPLETED | OUTPATIENT
Start: 2019-01-03

## 2019-01-03 RX ADMIN — OMALIZUMAB 0 MG: 202.5 INJECTION, SOLUTION SUBCUTANEOUS at 00:00

## 2019-01-03 NOTE — ASSESSMENT
[FreeTextEntry1] : Ms. Bloom is a 67 year old female with a history of AVDz, asthma, allergy, GERD, TBM, s/p pneumonia, who presents now for a follow up s/p asthmatic bronchitis (improved)\par \par Her chronic SOB which is multifactorial due to:\par - tracheomalacia s/p tracheoplasty with residual frequent mucus production, though patient is non-compliant with vest therapy \par - asthmatic symptoms. \par - chronic bronchitis\par - asthma\par - allergies rhinitis\par - GERD\par - poor breathing mechanics \par - CAD/AV disease\par \par problem 2: asthmatic bronchitis\par -s/p Medrol boost - now at baseline - 4 mg/day\par -continue to use albuterol via the nebulizer QID \par -followed by Mucomyst QiD\par -followed by the acapella device/ chest vest therapy \par -followed by Perforomist via the nebulizer BID\par -followed by Budesonide via the nebulizer BID \par -continue to use Breo Ellipta 200 at 1 inhalation QD \par -continue to use Spiriva 1 inhalation QD\par -continue to use Xolair 225 injection; follow up injections every 2 weeks- she is to continue on this while having chemotherapy, given today in the LUE (1/3/19)\par -continue to use Accolate 20 mg BID\par -continue Medrol 4 mg to continue \par -Information sheet given about prednisone to the patient to be reviewed, this medication is never to be used without consulting the prescribing physician. Proper dietary restraint is necessary specifically salt containing foods, if any reaction may occur should be reported. \par -Asthma is believed to be caused by inherited (genetic) and environmental factor, but its exact cause is unknown. Asthma may be triggered by allergens, lung infections, or irritants in the air. Asthma triggers are different for each person\par -Inhaler technique reviewed as well as oral hygiene techniques reviewed with patient. Avoidance of cold air, extremes of temperature, rescue inhaler should be used before exercise. Order of medication reviewed with patient. Recommended use of a cool mist humidifier in the bedroom. \par \par problem 3: tracheomalacia, residual bronchomalacia \par -s/p tracheoplasty with Dr. Mt Zapien\par -continue to follow up \par \par problem 4: chronic bronchitis and mucus clearance\par -continue to use acapella device multiple times daily\par -recommended to use chest vest therapy multiple times daily \par -she is being sent for sputum cultures \par Patient has a chronic cough greater than 6 months, tried and failed manual chest physiotherapy at home, no skilled caregiver available at home to perform manual CPT, tried and failed acapella vibratory physiotherapy, and recommended chest vest therapy \par \par problem 5: GERD\par -continue to use Protonix 40 mg before breakfast\par -continue Baclofen 10 mg Qmeal/QHS\par -Rule of 2s: avoid eating too much, eating too late, eating too spicy, eating two hours before bed\par -Things to avoid including overeating, spicy foods, tight clothing, eating within three hours of bed, this list is not all inclusive. \par -For treatment of reflux, possible options discussed including diet control, H2 blockers, PPIs, as well as coating motility agents discussed as treatment options. Timing of meals and proximity of last meal to sleep were discussed. If symptoms persist, a formal gastrointestinal evaluation is needed. \par \par problem 6: allergic rhinitis \par -continue to use nasal saline\par -continue to use Xyzal 5 mg before bed\par -Environmental measures for allergies were encouraged including mattress and pillow cover, air purifier, and environmental controls. \par \par problem 7: hx of abnormal aortic valve\par -continue to follow up with Dr. Leahy, AV Disease\par -echocadiogram in June 2018  (Tosin)\par \par problem 8: colon cancer\par -s/p radiation therapy, surgery \par -continue to use chemotherapy follow up with Dr. King or Dr. Mario\par \par problem 9: poor breathing mechanics\par -Proper breathing techniques were reviewed with an emphasis of exhalation. Patient instructed to breath in for 1 second and out for four seconds. Patient was encouraged to not talk while walking.\par \par problem 10: immunodeficiency\par - -Due to the fact that this pt has had more infections than would be expected and immunological blood work is indicated this would include: IgG subclasses, quantitative immunoglobulins, Strep pneumoniae titers as well as Vitamin D levels. Based on this blood work we will be able to decide where the pt needs additional pneumococcal vaccine either Prevnar 13 or pneumovax. Immunology evaluation will also be potentially indicated.\par \par problem 11: cardiac health\par -recommended to continue to f/u with cardiologist\par \par problem 12: r/o immunodeficiency (on Medrol)\par -Due to the fact that this pt has had more infections than would be expected and immunological blood work is indicated this would include: IgG subclasses, quantitative immunoglobulins, Strep pneumoniae titers as well as Vitamin D levels.\par -Based on this blood work we will be able to decide where the pt needs additional pneumococcal vaccine either Prevnar 13 or pneumovax. Immunology evaluation will also be potentially indicated. \par \par problem 13: abnormal chest CT- ? 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 \par - Continue  Amitriptyline 10 mg QHS for the first weeks then up to TID\par \par problem 15:  health maintenance \par -recommended yearly flu shot after October 15\par -recommended strep pneumonia vaccines: Prevnar-13 vaccine, followed by Pneumo vaccine 23 one year following\par -recommended early intervention for URIs\par -recommended regular osteoporosis evaluations\par -recommended early dermatological evaluations\par -recommended after the age of 50 to the age of 70, colonoscopy every 5 years \par \par \par F/U in 6 weeks \par She is encouraged to call with any changes, concerns, or questions.

## 2019-01-03 NOTE — HISTORY OF PRESENT ILLNESS
[FreeTextEntry1] : Ms. Bloom is a 68 year old female with a history of allergic rhinitis, abnormal chest CT, severe persistent asthma, fatigue, GERD, PNA, SOB and TBM presenting to the office today for a follow up visit. Her chief complaint is elevated heart rate.\par -she states that she has been feeling generally well, although her heart rate has been high. she feels she has been feeling greatly improved.\par -she reports that she has been wheezing but not more than usual\par -she states that she has been having occasional palpitations\par -she reports that she will be exercising 3x weekly with her PT\par -she states that she put on some weight following the holidays\par -she notes that her blood sugar has been irregular due to her high dose steroid (Medrol)\par -she will be following up with Dr. Zapien next week\par -she denies any headaches, nausea, vomiting, fever, chills, sweats, chest pain, chest pressure, diarrhea, constipation, dysphagia, dizziness, leg swelling, leg pain, itchy eyes, itchy ears, heartburn, reflux, or sour taste in the mouth.

## 2019-01-03 NOTE — PHYSICAL EXAM

## 2019-01-03 NOTE — PROCEDURE
[FreeTextEntry1] : PFT - spi reveals mild restrictive / moderate obstructive dysfunction; FEV1 is 0.88 which is 37% of predicted, normal flow volume loop. 30% improvement with bronchodilators. Normal volumes / diffusion, DLCO is 19.2, which is 98% of predicted.

## 2019-01-03 NOTE — REASON FOR VISIT
[Follow-Up] : a follow-up visit [FreeTextEntry1] : allergic rhinitis, abnormal chest CT, severe persistent asthma, fatigue, GERD, PNA, SOB and TBM

## 2019-01-03 NOTE — ADDENDUM
[FreeTextEntry1] : All medical record entries made by rogers Arredondo were at Dr. Eulalio Allison's, direction and personally dictated by me on 01/03/2019. I have reviewed the chart and agree that the record accurately reflects my personal performance of the history, physical exam, assessment and plan. I have also personally directed, reviewed, and agree with the discharge instructions.

## 2019-01-04 ENCOUNTER — APPOINTMENT (OUTPATIENT)
Dept: WOUND CARE | Facility: CLINIC | Age: 69
End: 2019-01-04
Payer: MEDICARE

## 2019-01-04 PROCEDURE — 99213 OFFICE O/P EST LOW 20 MIN: CPT

## 2019-01-10 ENCOUNTER — INPATIENT (INPATIENT)
Facility: HOSPITAL | Age: 69
LOS: 8 days | Discharge: HOME | DRG: 193 | End: 2019-01-19
Attending: INTERNAL MEDICINE | Admitting: INTERNAL MEDICINE
Payer: MEDICARE

## 2019-01-10 ENCOUNTER — APPOINTMENT (OUTPATIENT)
Dept: THORACIC SURGERY | Facility: CLINIC | Age: 69
End: 2019-01-10

## 2019-01-10 VITALS
TEMPERATURE: 100 F | DIASTOLIC BLOOD PRESSURE: 87 MMHG | RESPIRATION RATE: 20 BRPM | OXYGEN SATURATION: 95 % | HEART RATE: 111 BPM | SYSTOLIC BLOOD PRESSURE: 153 MMHG

## 2019-01-10 DIAGNOSIS — K62.5 HEMORRHAGE OF ANUS AND RECTUM: Chronic | ICD-10-CM

## 2019-01-10 DIAGNOSIS — Z98.890 OTHER SPECIFIED POSTPROCEDURAL STATES: Chronic | ICD-10-CM

## 2019-01-10 DIAGNOSIS — Z98.89 OTHER SPECIFIED POSTPROCEDURAL STATES: Chronic | ICD-10-CM

## 2019-01-10 DIAGNOSIS — Z96.653 PRESENCE OF ARTIFICIAL KNEE JOINT, BILATERAL: Chronic | ICD-10-CM

## 2019-01-10 DIAGNOSIS — H05.352 EXOSTOSIS OF LEFT ORBIT: Chronic | ICD-10-CM

## 2019-01-10 DIAGNOSIS — Z87.09 PERSONAL HISTORY OF OTHER DISEASES OF THE RESPIRATORY SYSTEM: Chronic | ICD-10-CM

## 2019-01-10 PROCEDURE — 99285 EMERGENCY DEPT VISIT HI MDM: CPT | Mod: GC,25

## 2019-01-10 PROCEDURE — 93010 ELECTROCARDIOGRAM REPORT: CPT | Mod: GC

## 2019-01-11 DIAGNOSIS — I48.91 UNSPECIFIED ATRIAL FIBRILLATION: ICD-10-CM

## 2019-01-11 DIAGNOSIS — Z29.9 ENCOUNTER FOR PROPHYLACTIC MEASURES, UNSPECIFIED: ICD-10-CM

## 2019-01-11 DIAGNOSIS — J96.01 ACUTE RESPIRATORY FAILURE WITH HYPOXIA: ICD-10-CM

## 2019-01-11 DIAGNOSIS — C19 MALIGNANT NEOPLASM OF RECTOSIGMOID JUNCTION: ICD-10-CM

## 2019-01-11 DIAGNOSIS — J39.8 OTHER SPECIFIED DISEASES OF UPPER RESPIRATORY TRACT: ICD-10-CM

## 2019-01-11 DIAGNOSIS — E11.9 TYPE 2 DIABETES MELLITUS WITHOUT COMPLICATIONS: ICD-10-CM

## 2019-01-11 DIAGNOSIS — J18.9 PNEUMONIA, UNSPECIFIED ORGANISM: ICD-10-CM

## 2019-01-11 LAB
ALBUMIN SERPL ELPH-MCNC: 4.2 G/DL — SIGNIFICANT CHANGE UP (ref 3.3–5)
ALP SERPL-CCNC: 89 U/L — SIGNIFICANT CHANGE UP (ref 40–120)
ALT FLD-CCNC: 9 U/L — LOW (ref 10–45)
ANION GAP SERPL CALC-SCNC: 16 MMOL/L — SIGNIFICANT CHANGE UP (ref 5–17)
APPEARANCE UR: CLEAR — SIGNIFICANT CHANGE UP
AST SERPL-CCNC: 9 U/L — LOW (ref 10–40)
BACTERIA # UR AUTO: NEGATIVE — SIGNIFICANT CHANGE UP
BASOPHILS # BLD AUTO: 0 K/UL — SIGNIFICANT CHANGE UP (ref 0–0.2)
BASOPHILS NFR BLD AUTO: 0.2 % — SIGNIFICANT CHANGE UP (ref 0–2)
BILIRUB SERPL-MCNC: 0.3 MG/DL — SIGNIFICANT CHANGE UP (ref 0.2–1.2)
BILIRUB UR-MCNC: NEGATIVE — SIGNIFICANT CHANGE UP
BUN SERPL-MCNC: 8 MG/DL — SIGNIFICANT CHANGE UP (ref 7–23)
CALCIUM SERPL-MCNC: 9.4 MG/DL — SIGNIFICANT CHANGE UP (ref 8.4–10.5)
CHLORIDE SERPL-SCNC: 98 MMOL/L — SIGNIFICANT CHANGE UP (ref 96–108)
CO2 SERPL-SCNC: 24 MMOL/L — SIGNIFICANT CHANGE UP (ref 22–31)
COLOR SPEC: YELLOW — SIGNIFICANT CHANGE UP
CREAT SERPL-MCNC: 0.6 MG/DL — SIGNIFICANT CHANGE UP (ref 0.5–1.3)
DIFF PNL FLD: NEGATIVE — SIGNIFICANT CHANGE UP
DIGOXIN SERPL-MCNC: 0.7 NG/ML — LOW (ref 0.8–2)
EOSINOPHIL # BLD AUTO: 0.2 K/UL — SIGNIFICANT CHANGE UP (ref 0–0.5)
EOSINOPHIL NFR BLD AUTO: 2.5 % — SIGNIFICANT CHANGE UP (ref 0–6)
EPI CELLS # UR: 1 /HPF — SIGNIFICANT CHANGE UP
GLUCOSE SERPL-MCNC: 151 MG/DL — HIGH (ref 70–99)
GLUCOSE UR QL: NEGATIVE — SIGNIFICANT CHANGE UP
HCOV OC43 RNA SPEC QL NAA+PROBE: DETECTED
HCOV PNL SPEC NAA+PROBE: DETECTED
HCT VFR BLD CALC: 41 % — SIGNIFICANT CHANGE UP (ref 34.5–45)
HGB BLD-MCNC: 12.9 G/DL — SIGNIFICANT CHANGE UP (ref 11.5–15.5)
HYALINE CASTS # UR AUTO: 0 /LPF — SIGNIFICANT CHANGE UP (ref 0–2)
KETONES UR-MCNC: NEGATIVE — SIGNIFICANT CHANGE UP
LEUKOCYTE ESTERASE UR-ACNC: NEGATIVE — SIGNIFICANT CHANGE UP
LYMPHOCYTES # BLD AUTO: 0.9 K/UL — LOW (ref 1–3.3)
LYMPHOCYTES # BLD AUTO: 13.1 % — SIGNIFICANT CHANGE UP (ref 13–44)
MCHC RBC-ENTMCNC: 22.2 PG — LOW (ref 27–34)
MCHC RBC-ENTMCNC: 31.5 GM/DL — LOW (ref 32–36)
MCV RBC AUTO: 70.6 FL — LOW (ref 80–100)
MONOCYTES # BLD AUTO: 0.6 K/UL — SIGNIFICANT CHANGE UP (ref 0–0.9)
MONOCYTES NFR BLD AUTO: 8 % — SIGNIFICANT CHANGE UP (ref 2–14)
NEUTROPHILS # BLD AUTO: 5.3 K/UL — SIGNIFICANT CHANGE UP (ref 1.8–7.4)
NEUTROPHILS NFR BLD AUTO: 76.1 % — SIGNIFICANT CHANGE UP (ref 43–77)
NITRITE UR-MCNC: NEGATIVE — SIGNIFICANT CHANGE UP
PH UR: 6 — SIGNIFICANT CHANGE UP (ref 5–8)
PLATELET # BLD AUTO: 229 K/UL — SIGNIFICANT CHANGE UP (ref 150–400)
POTASSIUM SERPL-MCNC: 4 MMOL/L — SIGNIFICANT CHANGE UP (ref 3.5–5.3)
POTASSIUM SERPL-SCNC: 4 MMOL/L — SIGNIFICANT CHANGE UP (ref 3.5–5.3)
PROCALCITONIN SERPL-MCNC: 0.1 NG/ML — SIGNIFICANT CHANGE UP (ref 0.02–0.1)
PROT SERPL-MCNC: 7.6 G/DL — SIGNIFICANT CHANGE UP (ref 6–8.3)
PROT UR-MCNC: ABNORMAL
RAPID RVP RESULT: DETECTED
RBC # BLD: 5.81 M/UL — HIGH (ref 3.8–5.2)
RBC # FLD: 18.3 % — HIGH (ref 10.3–14.5)
RBC CASTS # UR COMP ASSIST: 6 /HPF — HIGH (ref 0–4)
SODIUM SERPL-SCNC: 138 MMOL/L — SIGNIFICANT CHANGE UP (ref 135–145)
SP GR SPEC: 1.02 — SIGNIFICANT CHANGE UP (ref 1.01–1.02)
UROBILINOGEN FLD QL: NEGATIVE — SIGNIFICANT CHANGE UP
WBC # BLD: 7 K/UL — SIGNIFICANT CHANGE UP (ref 3.8–10.5)
WBC # FLD AUTO: 7 K/UL — SIGNIFICANT CHANGE UP (ref 3.8–10.5)
WBC UR QL: 4 /HPF — SIGNIFICANT CHANGE UP (ref 0–5)

## 2019-01-11 PROCEDURE — 71045 X-RAY EXAM CHEST 1 VIEW: CPT | Mod: 26

## 2019-01-11 PROCEDURE — 99223 1ST HOSP IP/OBS HIGH 75: CPT

## 2019-01-11 RX ORDER — CEFTRIAXONE 500 MG/1
1 INJECTION, POWDER, FOR SOLUTION INTRAMUSCULAR; INTRAVENOUS EVERY 24 HOURS
Qty: 0 | Refills: 0 | Status: DISCONTINUED | OUTPATIENT
Start: 2019-01-11 | End: 2019-01-11

## 2019-01-11 RX ORDER — AZITHROMYCIN 500 MG/1
500 TABLET, FILM COATED ORAL EVERY 24 HOURS
Qty: 0 | Refills: 0 | Status: DISCONTINUED | OUTPATIENT
Start: 2019-01-11 | End: 2019-01-11

## 2019-01-11 RX ORDER — TRAMADOL HYDROCHLORIDE 50 MG/1
25 TABLET ORAL EVERY 4 HOURS
Qty: 0 | Refills: 0 | Status: DISCONTINUED | OUTPATIENT
Start: 2019-01-11 | End: 2019-01-11

## 2019-01-11 RX ORDER — SIMETHICONE 80 MG/1
80 TABLET, CHEWABLE ORAL ONCE
Qty: 0 | Refills: 0 | Status: COMPLETED | OUTPATIENT
Start: 2019-01-11 | End: 2019-01-11

## 2019-01-11 RX ORDER — DEXTROSE 50 % IN WATER 50 %
25 SYRINGE (ML) INTRAVENOUS ONCE
Qty: 0 | Refills: 0 | Status: DISCONTINUED | OUTPATIENT
Start: 2019-01-11 | End: 2019-01-19

## 2019-01-11 RX ORDER — DOCUSATE SODIUM 100 MG
100 CAPSULE ORAL THREE TIMES A DAY
Qty: 0 | Refills: 0 | Status: DISCONTINUED | OUTPATIENT
Start: 2019-01-11 | End: 2019-01-19

## 2019-01-11 RX ORDER — DEXTROSE 50 % IN WATER 50 %
12.5 SYRINGE (ML) INTRAVENOUS ONCE
Qty: 0 | Refills: 0 | Status: DISCONTINUED | OUTPATIENT
Start: 2019-01-11 | End: 2019-01-19

## 2019-01-11 RX ORDER — CEFEPIME 1 G/1
2000 INJECTION, POWDER, FOR SOLUTION INTRAMUSCULAR; INTRAVENOUS EVERY 8 HOURS
Qty: 0 | Refills: 0 | Status: DISCONTINUED | OUTPATIENT
Start: 2019-01-11 | End: 2019-01-18

## 2019-01-11 RX ORDER — INSULIN GLARGINE 100 [IU]/ML
10 INJECTION, SOLUTION SUBCUTANEOUS AT BEDTIME
Qty: 0 | Refills: 0 | Status: DISCONTINUED | OUTPATIENT
Start: 2019-01-11 | End: 2019-01-12

## 2019-01-11 RX ORDER — CEFTRIAXONE 500 MG/1
1 INJECTION, POWDER, FOR SOLUTION INTRAMUSCULAR; INTRAVENOUS ONCE
Qty: 0 | Refills: 0 | Status: COMPLETED | OUTPATIENT
Start: 2019-01-11 | End: 2019-01-11

## 2019-01-11 RX ORDER — BUDESONIDE AND FORMOTEROL FUMARATE DIHYDRATE 160; 4.5 UG/1; UG/1
2 AEROSOL RESPIRATORY (INHALATION)
Qty: 0 | Refills: 0 | Status: DISCONTINUED | OUTPATIENT
Start: 2019-01-11 | End: 2019-01-19

## 2019-01-11 RX ORDER — PANTOPRAZOLE SODIUM 20 MG/1
40 TABLET, DELAYED RELEASE ORAL
Qty: 0 | Refills: 0 | Status: DISCONTINUED | OUTPATIENT
Start: 2019-01-11 | End: 2019-01-19

## 2019-01-11 RX ORDER — DIGOXIN 250 MCG
0.25 TABLET ORAL DAILY
Qty: 0 | Refills: 0 | Status: DISCONTINUED | OUTPATIENT
Start: 2019-01-11 | End: 2019-01-19

## 2019-01-11 RX ORDER — AZITHROMYCIN 500 MG/1
500 TABLET, FILM COATED ORAL EVERY 24 HOURS
Qty: 0 | Refills: 0 | Status: DISCONTINUED | OUTPATIENT
Start: 2019-01-11 | End: 2019-01-18

## 2019-01-11 RX ORDER — SODIUM CHLORIDE 9 MG/ML
1000 INJECTION, SOLUTION INTRAVENOUS
Qty: 0 | Refills: 0 | Status: DISCONTINUED | OUTPATIENT
Start: 2019-01-11 | End: 2019-01-19

## 2019-01-11 RX ORDER — AZITHROMYCIN 500 MG/1
500 TABLET, FILM COATED ORAL ONCE
Qty: 0 | Refills: 0 | Status: COMPLETED | OUTPATIENT
Start: 2019-01-11 | End: 2019-01-11

## 2019-01-11 RX ORDER — METOCLOPRAMIDE HCL 10 MG
5 TABLET ORAL THREE TIMES A DAY
Qty: 0 | Refills: 0 | Status: DISCONTINUED | OUTPATIENT
Start: 2019-01-11 | End: 2019-01-19

## 2019-01-11 RX ORDER — INSULIN LISPRO 100/ML
VIAL (ML) SUBCUTANEOUS
Qty: 0 | Refills: 0 | Status: DISCONTINUED | OUTPATIENT
Start: 2019-01-11 | End: 2019-01-14

## 2019-01-11 RX ORDER — INFLUENZA VIRUS VACCINE 15; 15; 15; 15 UG/.5ML; UG/.5ML; UG/.5ML; UG/.5ML
0.5 SUSPENSION INTRAMUSCULAR ONCE
Qty: 0 | Refills: 0 | Status: DISCONTINUED | OUTPATIENT
Start: 2019-01-11 | End: 2019-01-19

## 2019-01-11 RX ORDER — SODIUM CHLORIDE 9 MG/ML
500 INJECTION INTRAMUSCULAR; INTRAVENOUS; SUBCUTANEOUS ONCE
Qty: 0 | Refills: 0 | Status: COMPLETED | OUTPATIENT
Start: 2019-01-11 | End: 2019-01-11

## 2019-01-11 RX ORDER — IPRATROPIUM/ALBUTEROL SULFATE 18-103MCG
3 AEROSOL WITH ADAPTER (GRAM) INHALATION EVERY 6 HOURS
Qty: 0 | Refills: 0 | Status: DISCONTINUED | OUTPATIENT
Start: 2019-01-11 | End: 2019-01-19

## 2019-01-11 RX ORDER — DEXTROSE 50 % IN WATER 50 %
15 SYRINGE (ML) INTRAVENOUS ONCE
Qty: 0 | Refills: 0 | Status: DISCONTINUED | OUTPATIENT
Start: 2019-01-11 | End: 2019-01-19

## 2019-01-11 RX ORDER — MONTELUKAST 4 MG/1
10 TABLET, CHEWABLE ORAL DAILY
Qty: 0 | Refills: 0 | Status: DISCONTINUED | OUTPATIENT
Start: 2019-01-11 | End: 2019-01-19

## 2019-01-11 RX ORDER — AZITHROMYCIN 500 MG/1
500 TABLET, FILM COATED ORAL ONCE
Qty: 0 | Refills: 0 | Status: DISCONTINUED | OUTPATIENT
Start: 2019-01-11 | End: 2019-01-11

## 2019-01-11 RX ORDER — SENNA PLUS 8.6 MG/1
2 TABLET ORAL AT BEDTIME
Qty: 0 | Refills: 0 | Status: DISCONTINUED | OUTPATIENT
Start: 2019-01-11 | End: 2019-01-19

## 2019-01-11 RX ORDER — GLUCAGON INJECTION, SOLUTION 0.5 MG/.1ML
1 INJECTION, SOLUTION SUBCUTANEOUS ONCE
Qty: 0 | Refills: 0 | Status: DISCONTINUED | OUTPATIENT
Start: 2019-01-11 | End: 2019-01-19

## 2019-01-11 RX ORDER — IPRATROPIUM/ALBUTEROL SULFATE 18-103MCG
3 AEROSOL WITH ADAPTER (GRAM) INHALATION ONCE
Qty: 0 | Refills: 0 | Status: COMPLETED | OUTPATIENT
Start: 2019-01-11 | End: 2019-01-11

## 2019-01-11 RX ORDER — INSULIN LISPRO 100/ML
VIAL (ML) SUBCUTANEOUS AT BEDTIME
Qty: 0 | Refills: 0 | Status: DISCONTINUED | OUTPATIENT
Start: 2019-01-11 | End: 2019-01-12

## 2019-01-11 RX ORDER — APIXABAN 2.5 MG/1
5 TABLET, FILM COATED ORAL EVERY 12 HOURS
Qty: 0 | Refills: 0 | Status: DISCONTINUED | OUTPATIENT
Start: 2019-01-11 | End: 2019-01-19

## 2019-01-11 RX ADMIN — BUDESONIDE AND FORMOTEROL FUMARATE DIHYDRATE 2 PUFF(S): 160; 4.5 AEROSOL RESPIRATORY (INHALATION) at 18:14

## 2019-01-11 RX ADMIN — SENNA PLUS 2 TABLET(S): 8.6 TABLET ORAL at 22:30

## 2019-01-11 RX ADMIN — Medication 20 MILLIGRAM(S): at 13:42

## 2019-01-11 RX ADMIN — Medication 20 MILLIGRAM(S): at 22:29

## 2019-01-11 RX ADMIN — Medication 600 MILLIGRAM(S): at 22:53

## 2019-01-11 RX ADMIN — AZITHROMYCIN 250 MILLIGRAM(S): 500 TABLET, FILM COATED ORAL at 03:38

## 2019-01-11 RX ADMIN — Medication 100 MILLIGRAM(S): at 22:30

## 2019-01-11 RX ADMIN — APIXABAN 5 MILLIGRAM(S): 2.5 TABLET, FILM COATED ORAL at 22:30

## 2019-01-11 RX ADMIN — PANTOPRAZOLE SODIUM 40 MILLIGRAM(S): 20 TABLET, DELAYED RELEASE ORAL at 18:14

## 2019-01-11 RX ADMIN — Medication 5 MILLIGRAM(S): at 15:13

## 2019-01-11 RX ADMIN — Medication 3 MILLILITER(S): at 13:41

## 2019-01-11 RX ADMIN — SODIUM CHLORIDE 500 MILLILITER(S): 9 INJECTION INTRAMUSCULAR; INTRAVENOUS; SUBCUTANEOUS at 02:28

## 2019-01-11 RX ADMIN — CEFEPIME 100 MILLIGRAM(S): 1 INJECTION, POWDER, FOR SOLUTION INTRAMUSCULAR; INTRAVENOUS at 13:42

## 2019-01-11 RX ADMIN — CEFTRIAXONE 100 GRAM(S): 500 INJECTION, POWDER, FOR SOLUTION INTRAMUSCULAR; INTRAVENOUS at 02:27

## 2019-01-11 RX ADMIN — Medication 100 MILLIGRAM(S): at 13:41

## 2019-01-11 RX ADMIN — Medication 1: at 22:42

## 2019-01-11 RX ADMIN — Medication 3 MILLILITER(S): at 18:14

## 2019-01-11 RX ADMIN — Medication 2: at 18:06

## 2019-01-11 RX ADMIN — SIMETHICONE 80 MILLIGRAM(S): 80 TABLET, CHEWABLE ORAL at 05:21

## 2019-01-11 RX ADMIN — Medication 3 MILLILITER(S): at 00:17

## 2019-01-11 RX ADMIN — CEFEPIME 100 MILLIGRAM(S): 1 INJECTION, POWDER, FOR SOLUTION INTRAMUSCULAR; INTRAVENOUS at 22:26

## 2019-01-11 RX ADMIN — APIXABAN 5 MILLIGRAM(S): 2.5 TABLET, FILM COATED ORAL at 14:41

## 2019-01-11 RX ADMIN — MONTELUKAST 10 MILLIGRAM(S): 4 TABLET, CHEWABLE ORAL at 18:14

## 2019-01-11 RX ADMIN — Medication 40 MILLIGRAM(S): at 03:37

## 2019-01-11 RX ADMIN — Medication 0.25 MILLIGRAM(S): at 13:41

## 2019-01-11 RX ADMIN — SODIUM CHLORIDE 500 MILLILITER(S): 9 INJECTION INTRAMUSCULAR; INTRAVENOUS; SUBCUTANEOUS at 05:07

## 2019-01-11 NOTE — ED PROVIDER NOTE - OBJECTIVE STATEMENT
69 yo F extensive medical history including, CKd, colon cancer with stoma, hx asthma presenting with fever and cough x 3 days, started using vest and nebulizer. This morning, used nebulizer every 4 hours. t max 100.5 today. Has not taken tylenol or motrin.

## 2019-01-11 NOTE — CONSULT NOTE ADULT - SUBJECTIVE AND OBJECTIVE BOX
CHIEF COMPLAINT:    HPI:    PAST MEDICAL & SURGICAL HISTORY:  TIA (transient ischemic attack): multiple, last 5 years ago - presents as right-sided weakness  DVT (deep venous thrombosis): 15-20 years ago, took coumadin  Seizure: x 1 18  Rectal bleeding  Colorectal cancer: 2018- last treatment , chemo and radiation  Tracheobronchomalacia: diagnosed , s/p bronchial thermoplasty  (Dr Zapien); recent bronchoscopy 2018 revealed no evidence of tracheobronchomalacia in trachea or bronchial tubes  Asthma  Pelvic fracture  Aortic insufficiency: moderate AR on echo 5/3/2018  Adrenal insufficiency: Medrol daily for over 50 years  COPD (chronic obstructive pulmonary disease)  Diabetes: Type 2  Atrial fibrillation: paroxysmal, on eliquis  Rectal bleeding: exam under anesthesia ([facility]) 2018  S/P bronchoscopy: 2018 - [facility] (Dr Zapien) no evidence of tracheobronchomalacia in trachea or bronchial tubes  History of tracheomalacia:  - attempted tracheal stenting ([facility])- course complicated by obstruction, respiratory failure, multiple CPR attempts -  stent discontinued; 10/20/2016 Tracheobronchoplasty (Prolene Mesh) performed at [facility] by Dr Zapien  H/O pelvic surgery: 5 years ago - s/p fracture  Exostosis of orbit, 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  History of sinus surgery: multiple sinus surgeries  H/O total knee replacement, bilateral: 5 years ago  History of partial hysterectomy: 30 years ago - fibroids      FAMILY HISTORY:  Family history of diabetes mellitus type II  Family history of breast cancer (Sibling)  Family history of asthma      SOCIAL HISTORY:  Smoking: [ ] Never Smoked [ ] Former Smoker (__ packs x ___ years) [ ] Current Smoker  (__ packs x ___ years)  Substance Use: [ ] Never Used [ ] Used ____  EtOH Use:  Marital Status: [ ] Single [ ]  [ ]  [ ]   Sexual History:   Occupation:  Recent Travel:  Country of Birth:  Advance Directives:    Allergies    ampicillin (Short breath)  aspirin (Short breath)  Avelox (Short breath; Pruritus)  codeine (Short breath)  Dilaudid (Short breath)  iodine (Short breath; Swelling)  penicillin (Short breath)  shellfish (Anaphylaxis)  tetanus toxoid (Short breath)  Valium (Short breath)    Intolerances        HOME MEDICATIONS:    REVIEW OF SYSTEMS:  Constitutional: [ ] negative [ ] fevers [ ] chills [ ] weight loss [ ] weight gain  HEENT: [ ] negative [ ] dry eyes [ ] eye irritation [ ] postnasal drip [ ] nasal congestion  CV: [ ] negative  [ ] chest pain [ ] orthopnea [ ] palpitations [ ] murmur  Resp: [ ] negative [ ] cough [ ] shortness of breath [ ] dyspnea [ ] wheezing [ ] sputum [ ] hemoptysis  GI: [ ] negative [ ] nausea [ ] vomiting [ ] diarrhea [ ] constipation [ ] abd pain [ ] dysphagia   : [ ] negative [ ] dysuria [ ] nocturia [ ] hematuria [ ] increased urinary frequency  Musculoskeletal: [ ] negative [ ] back pain [ ] myalgias [ ] arthralgias [ ] fracture  Skin: [ ] negative [ ] rash [ ] itch  Neurological: [ ] negative [ ] headache [ ] dizziness [ ] syncope [ ] weakness [ ] numbness  Psychiatric: [ ] negative [ ] anxiety [ ] depression  Endocrine: [ ] negative [ ] diabetes [ ] thyroid problem  Hematologic/Lymphatic: [ ] negative [ ] anemia [ ] bleeding problem  Allergic/Immunologic: [ ] negative [ ] itchy eyes [ ] nasal discharge [ ] hives [ ] angioedema  [ ] All other systems negative  [ ] Unable to assess ROS because ________    OBJECTIVE:  ICU Vital Signs Last 24 Hrs  T(C): 37.4 (2019 08:24), Max: 37.7 (10 Facundo 2019 23:48)  T(F): 99.3 (2019 08:24), Max: 99.9 (10 Facundo 2019 23:48)  HR: 88 (2019 08:24) (88 - 111)  BP: 105/59 (2019 08:24) (105/50 - 153/87)  BP(mean): --  ABP: --  ABP(mean): --  RR: 18 (2019 08:24) (18 - 20)  SpO2: 99% (2019 08:24) (95% - 100%)        CAPILLARY BLOOD GLUCOSE      POCT Blood Glucose.: 179 mg/dL (2019 09:16)      PHYSICAL EXAM:  General:   HEENT:   Lymph Nodes:  Neck:   Respiratory:   Cardiovascular:   Abdomen:   Extremities:   Skin:   Neurological:  Psychiatry:    HOSPITAL MEDICATIONS:  apixaban 5 milliGRAM(s) Oral every 12 hours      digoxin     Tablet 0.25 milliGRAM(s) Oral daily    dextrose 40% Gel 15 Gram(s) Oral once PRN  dextrose 50% Injectable 12.5 Gram(s) IV Push once  dextrose 50% Injectable 25 Gram(s) IV Push once  glucagon  Injectable 1 milliGRAM(s) IntraMuscular once PRN  insulin glargine Injectable (LANTUS) 10 Unit(s) SubCutaneous at bedtime  insulin lispro (HumaLOG) corrective regimen sliding scale   SubCutaneous three times a day before meals  insulin lispro (HumaLOG) corrective regimen sliding scale   SubCutaneous at bedtime  predniSONE   Tablet 40 milliGRAM(s) Oral daily    buDESOnide 160 MICROgram(s)/formoterol 4.5 MICROgram(s) Inhaler 2 Puff(s) Inhalation two times a day  guaiFENesin  milliGRAM(s) Oral every 12 hours  montelukast 10 milliGRAM(s) Oral daily    traMADol 25 milliGRAM(s) Oral every 4 hours PRN    docusate sodium 100 milliGRAM(s) Oral three times a day  metoclopramide 5 milliGRAM(s) Oral three times a day PRN  pantoprazole    Tablet 40 milliGRAM(s) Oral before breakfast  senna 2 Tablet(s) Oral at bedtime        dextrose 5%. 1000 milliLiter(s) IV Continuous <Continuous>            LABS:                        12.9   7.0   )-----------( 229      ( 2019 01:22 )             41.0     Hgb Trend: 12.9<--  11    138  |  98  |  8   ----------------------------<  151<H>  4.0   |  24  |  0.60    Ca    9.4      2019 01:22    TPro  7.6  /  Alb  4.2  /  TBili  0.3  /  DBili  x   /  AST  9<L>  /  ALT  9<L>  /  AlkPhos  89  11    Creatinine Trend: 0.60<--    Urinalysis Basic - ( 2019 06:24 )    Color: Yellow / Appearance: Clear / S.023 / pH: x  Gluc: x / Ketone: Negative  / Bili: Negative / Urobili: Negative   Blood: x / Protein: 30 mg/dL / Nitrite: Negative   Leuk Esterase: Negative / RBC: 6 /hpf / WBC 4 /HPF   Sq Epi: x / Non Sq Epi: 1 /hpf / Bacteria: Negative            MICROBIOLOGY:     RADIOLOGY:  [ ] Reviewed and interpreted by me    PULMONARY FUNCTION TESTS:    EKG: CHIEF COMPLAINT: cough/ shortness of breath/ wheezing    HPI: 68 yr F with a PMH of COPD/asthma, tracheobronchomalacia s/p tracheo-bronchoplasty in 10/16 on chronic low dose Prednisone, Afib on Eliquis, DM2, HTN, Squamous cell CA of the anus on chemo/XRT, 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 Pt reports that she began experiencing worsening of her chronic shortness of breath on Saturday and over the week developed a worsening cough productive of yellow sputum along with wheezing which was not relieved by her home nebulizers. She was placed on a medrol taper by Dr. Allison as an outpt but continued to experience worsening symptoms. She also reports a fever with chills ( T max 100.8 F at home) for the past 2 days which prompted her to come to the ED. RVP was + for coronavirus. She received Prednisone 40 mg , nebs and Azithro/ Ceftriaxone in ED. CXR was clear.      PAST MEDICAL & SURGICAL HISTORY:  TIA (transient ischemic attack): multiple, last 5 years ago - presents as right-sided weakness  DVT (deep venous thrombosis): 15-20 years ago, took coumadin  Seizure: x 1 18  Rectal bleeding  Colorectal cancer: 2018- last treatment , chemo and radiation  Tracheobronchomalacia: diagnosed , s/p bronchial thermoplasty  (Dr Zapien); recent bronchoscopy 2018 revealed no evidence of tracheobronchomalacia in trachea or bronchial tubes  Asthma  Pelvic fracture  Aortic insufficiency: moderate AR on echo 5/3/2018  Adrenal insufficiency: Medrol daily for over 50 years  COPD (chronic obstructive pulmonary disease)  Diabetes: Type 2  Atrial fibrillation: paroxysmal, on eliquis  Rectal bleeding: exam under anesthesia ([facility]) 2018  S/P bronchoscopy: 2018 - [facility] (Dr Zapien) no evidence of tracheobronchomalacia in trachea or bronchial tubes  History of tracheomalacia:  - attempted tracheal stenting ([facility])- course complicated by obstruction, respiratory failure, multiple CPR attempts -  stent discontinued; 10/20/2016 Tracheobronchoplasty (Prolene Mesh) performed at [facility] by Dr Zapien  H/O pelvic surgery: 5 years ago - s/p fracture  Exostosis of orbit, 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  History of sinus surgery: multiple sinus surgeries  H/O total knee replacement, bilateral: 5 years ago  History of partial hysterectomy: 30 years ago - fibroids      FAMILY HISTORY:  Family history of diabetes mellitus type II  Family history of breast cancer (Sibling)  Family history of asthma      SOCIAL HISTORY:  Smoking: [x ] Never Smoked [ ] Former Smoker (__ packs x ___ years) [ ] Current Smoker  (__ packs x ___ years)  Substance Use: [x ] Never Used [ ] Used ____  EtOH Use: Social  Marital Status: [ ] Single [ ]  [ ]  [x ]   Sexual History: NC  Occupation: Retired    Recent Travel: None  Advance Directives: Full code     Allergies    ampicillin (Short breath)  aspirin (Short breath)  Avelox (Short breath; Pruritus)  codeine (Short breath)  Dilaudid (Short breath)  iodine (Short breath; Swelling)  penicillin (Short breath)  shellfish (Anaphylaxis)  tetanus toxoid (Short breath)  Valium (Short breath)    Intolerances        HOME MEDICATIONS:  Reviewed     REVIEW OF SYSTEMS:  Constitutional: [ ] negative [+ ] fevers [+ ] chills [ ] weight loss [ ] weight gain  HEENT: [ ] negative [ ] dry eyes [- ] eye irritation [ +] postnasal drip [ +] nasal congestion  CV: [ ] negative  [- ] chest pain [- ] orthopnea [ +] palpitations [ ] murmur  Resp: [ ] negative [ +] cough [+ ] shortness of breath [ ] dyspnea [+ ] wheezing [+ ] sputum [+ ] hemoptysis  GI: [ ] negative [ +] nausea [- ] vomiting [- ] diarrhea [ -] constipation [ -] abd pain [ -] dysphagia   : [ ] negative [- ] dysuria [ -] nocturia [- ] hematuria [ ] increased urinary frequency  Musculoskeletal: [ ] negative -[ ] back pain [ -] myalgias [ ] arthralgias [ ] fracture  Skin: [ ] negative [ ] rash [- ] itch  Neurological: [ ] negative [ -] headache [- ] dizziness [- ] syncope [ ] weakness [ ] numbness  Psychiatric: [ ] negative [- ] anxiety [ ] depression  Endocrine: [ ] negative [ +] diabetes [ ] thyroid problem  Hematologic/Lymphatic: [ ] negative [ ] anemia [- ] bleeding problem  Allergic/Immunologic: [ ] negative [- ] itchy eyes [ ] nasal discharge [ ] hives [ ] angioedema  [x ] All other systems negative  [ ] Unable to assess ROS because ________    OBJECTIVE:  ICU Vital Signs Last 24 Hrs  T(C): 37.4 (2019 08:24), Max: 37.7 (10 Facundo 2019 23:48)  T(F): 99.3 (2019 08:24), Max: 99.9 (10 Facundo 2019 23:48)  HR: 88 (2019 08:24) (88 - 111)  BP: 105/59 (2019 08:24) (105/50 - 153/87)  BP(mean): --  ABP: --  ABP(mean): --  RR: 18 (2019 08:24) (18 - 20)  SpO2: 99% (2019 08:24) (95% - 100%)        CAPILLARY BLOOD GLUCOSE      POCT Blood Glucose.: 179 mg/dL (2019 09:16)      PHYSICAL EXAM:  General: NAD, but has difficulty completing sentences due to bouts of cough  HEENT: PERRLA  Lymph Nodes: No palpable cervical LNs  Neck: Supple  Respiratory: B/L wheezing, no crackles   Cardiovascular: RRR, S1+S2+0  Abdomen: Soft, NT, ND, BS+  Extremities: No edema, pulses + b/l LEs  Skin: No rash  Neurological: AAOx3, grossly non focal   Psychiatry: Normal mood     HOSPITAL MEDICATIONS:  apixaban 5 milliGRAM(s) Oral every 12 hours      digoxin     Tablet 0.25 milliGRAM(s) Oral daily    dextrose 40% Gel 15 Gram(s) Oral once PRN  dextrose 50% Injectable 12.5 Gram(s) IV Push once  dextrose 50% Injectable 25 Gram(s) IV Push once  glucagon  Injectable 1 milliGRAM(s) IntraMuscular once PRN  insulin glargine Injectable (LANTUS) 10 Unit(s) SubCutaneous at bedtime  insulin lispro (HumaLOG) corrective regimen sliding scale   SubCutaneous three times a day before meals  insulin lispro (HumaLOG) corrective regimen sliding scale   SubCutaneous at bedtime  predniSONE   Tablet 40 milliGRAM(s) Oral daily    buDESOnide 160 MICROgram(s)/formoterol 4.5 MICROgram(s) Inhaler 2 Puff(s) Inhalation two times a day  guaiFENesin  milliGRAM(s) Oral every 12 hours  montelukast 10 milliGRAM(s) Oral daily    traMADol 25 milliGRAM(s) Oral every 4 hours PRN    docusate sodium 100 milliGRAM(s) Oral three times a day  metoclopramide 5 milliGRAM(s) Oral three times a day PRN  pantoprazole    Tablet 40 milliGRAM(s) Oral before breakfast  senna 2 Tablet(s) Oral at bedtime        dextrose 5%. 1000 milliLiter(s) IV Continuous <Continuous>            LABS:                        12.9   7.0   )-----------( 229      ( 2019 01:22 )             41.0     Hgb Trend: 12.9<--      138  |  98  |  8   ----------------------------<  151<H>  4.0   |  24  |  0.60    Ca    9.4      2019 01:22    TPro  7.6  /  Alb  4.2  /  TBili  0.3  /  DBili  x   /  AST  9<L>  /  ALT  9<L>  /  AlkPhos  89      Creatinine Trend: 0.60<--    Urinalysis Basic - ( 2019 06:24 )    Color: Yellow / Appearance: Clear / S.023 / pH: x  Gluc: x / Ketone: Negative  / Bili: Negative / Urobili: Negative   Blood: x / Protein: 30 mg/dL / Nitrite: Negative   Leuk Esterase: Negative / RBC: 6 /hpf / WBC 4 /HPF   Sq Epi: x / Non Sq Epi: 1 /hpf / Bacteria: Negative            MICROBIOLOGY:   RVP : + coronavirus     RADIOLOGY:  < from: Xray Chest 1 View- PORTABLE-Urgent (19 @ 01:16) >  The heart size is normal. Right IJ Mediport with tip in SVC.  The lungs are clear. No pleural effusion or pneumothorax.  No acute osseous findings.      < end of copied text >    [ ] Reviewed and interpreted by me    PULMONARY FUNCTION TESTS:    EKG:

## 2019-01-11 NOTE — H&P ADULT - PSH
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  H/O pelvic surgery  5 years ago - s/p fracture  H/O total knee replacement, bilateral  5 years ago  History of partial hysterectomy  30 years ago - fibroids  History of sinus surgery  multiple sinus surgeries  History of tracheomalacia  2015 - attempted tracheal stenting ([facility])- course complicated by obstruction, respiratory failure, multiple CPR attempts -  stent discontinued; 10/20/2016 Tracheobronchoplasty (Prolene Mesh) performed at [facility] by Dr Zapien  Rectal bleeding  exam under anesthesia ([facility]) 2/2018  S/P bronchoscopy  6/5/2018 - [facility] (Dr Zapien) no evidence of tracheobronchomalacia in trachea or bronchial tubes

## 2019-01-11 NOTE — H&P ADULT - PROBLEM SELECTOR PLAN 3
Pt has underlying lung disease, with chronic cough, sputum production. Uses chest vest at home, will continue here.  Uses [place] as well.  Continuing inhalers. Pulm consulted, appreciate recs.

## 2019-01-11 NOTE — H&P ADULT - PROBLEM SELECTOR PLAN 1
Pt has RML/RLL opacity, productive sputum worsened from baseline, cough, and shortness of breath requiring oxygen and duoneb support. Positive for Coronavirus   - Continue antibiotics for pneumonia including Ceftriaxone and azithromycin  - Continue steroids - prednisone 40 mg daily   - Continue duonebs Q6H standing  - Continue symbicort  - Pulm consult appreciated

## 2019-01-11 NOTE — H&P ADULT - HISTORY OF PRESENT ILLNESS
67 yo F with pmhx of Colon Cx s/p colectomy, CKD, asthma, afib, COPD, Diabetes, tracheomalacia, presents with worsening fever and cough since Tuesday. Pt was on medrol dose pack, was using her chest vest at home and duonebs every 4 hours without improvement.  Pt felt short of breath, cough is productive.  She has chronic cough with productive sputum, but this is worse and increased.  She normally is not able to ambulate and speak at the same time due to her baseline shortness of breath.  She does not use oxygen at home.  She saw Dr. Allison in the office last week when she was given the medrol pack. Pt denies any chest pain, dizziness, headaches. No palpitations. No abdominal pain, N/V/D.    In the ED, she was given antibiotics, and normal saline 1 L with Prednisone 40 mg tablet.  Blood cultures were collected. She received a neb treatment.  She feels well, no SOB, however, still requiring oxygen.

## 2019-01-11 NOTE — ED ADULT NURSE REASSESSMENT NOTE - NS ED NURSE REASSESS COMMENT FT1
pt requesting to have her port accessed for IV medication and fluid administration; Xray completed at bedside; pending xray result to confirm port placement. Will reassess

## 2019-01-11 NOTE — ED PROVIDER NOTE - ATTENDING CONTRIBUTION TO CARE
68 yof pmhx asthma, a fib on eliquis, colorectal ca - surg in july of last year, p/w fever, cough, sob x 2 days. states had fever of 101 at home yest and day before, was using inhalers every 4 hrs w/o relief. no signif cp. + sob w exertion and productive cough. no sick contacts.     ROS:   constitutional - + fever, no chills  eyes - no visual changes, no redness  eent - + sore throat, + nasal congestion  cvs - no chest pain, no leg swelling  resp - + shortness of breath, + cough  gi - no abdominal pain, no vomiting, no diarrhea  gu - no dysuria, no hematuria  msk - no acute back pain, no joint swelling  skin - no rashes, no jaundice  neuro - no headache, no focal weakness  psych - no acute mental health issue     Physical Exam:   constitutional - well appearing, awake and alert, oriented x3  head - no external evidence of trauma  cvs - rrr, no murmurs, no peripheral edema  resp - diffuse coarse rhonchi. no signif wheezing. pt actively coughing during exam   gi - abdomen soft and nontender, no rigidity, guarding or rebound, bowel sounds present  msk - moving all extremities spontaneously  neuro - alert and oriented x3, no focal deficits, CNs 2-12 grossly intact  skin- no jaundice, warm and dry  psych - mood and affect wnl, no apparent risk to self or others     likely pna vs viral syndrome. will admit as pt hypoxic on RA, not normally 02 dependent. JORGE Koch MD 68 yof pmhx asthma, a fib on eliquis, colorectal ca - surg in july of last year, p/w fever, cough, sob x 2 days. states had fever of 101 at home yest and day before, was using inhalers every 4 hrs w/o relief. no signif cp. + sob w exertion and productive cough. no sick contacts.     ROS:   constitutional - + fever, no chills  eyes - no visual changes, no redness  eent - + sore throat, + nasal congestion  cvs - no chest pain, no leg swelling  resp - + shortness of breath, + cough  gi - no abdominal pain, no vomiting, no diarrhea  gu - no dysuria, no hematuria  msk - no acute back pain, no joint swelling  skin - no rashes, no jaundice  neuro - no headache, no focal weakness  psych - no acute mental health issue     Physical Exam:   constitutional - well appearing, awake and alert, oriented x3  head - no external evidence of trauma  cvs - rrr, no murmurs, no peripheral edema  resp - diffuse coarse rhonchi. no signif wheezing. pt actively coughing during exam   gi - abdomen soft and nontender, no rigidity, guarding or rebound, bowel sounds present  msk - moving all extremities spontaneously  neuro - alert and oriented x3, no focal deficits, CNs 2-12 grossly intact  skin- no jaundice, warm and dry  psych - mood and affect wnl, no apparent risk to self or others     likely pna vs viral syndrome. will admit as pt hypoxic on RA, not normally 02 dependent. found to be rvp pos, however clinically pt may benefit from iv abx for presumptive pna as well. JORGE Koch MD

## 2019-01-11 NOTE — ED ADULT NURSE NOTE - NSIMPLEMENTINTERV_GEN_ALL_ED
Implemented All Universal Safety Interventions:  [place] to call system. Call bell, personal items and telephone within reach. Instruct patient to call for assistance. Room bathroom lighting operational. Non-slip footwear when patient is off stretcher. Physically safe environment: no spills, clutter or unnecessary equipment. Stretcher in lowest position, wheels locked, appropriate side rails in place. Implemented All Fall Risk Interventions:  [place] to call system. Call bell, personal items and telephone within reach. Instruct patient to call for assistance. Room bathroom lighting operational. Non-slip footwear when patient is off stretcher. Physically safe environment: no spills, clutter or unnecessary equipment. Stretcher in lowest position, wheels locked, appropriate side rails in place. Provide visual cue, wrist band, yellow gown, etc. Monitor gait and stability. Monitor for mental status changes and reorient to person, place, and time. Review medications for side effects contributing to fall risk. Reinforce activity limits and safety measures with patient and family.

## 2019-01-11 NOTE — H&P ADULT - NSHPPHYSICALEXAM_GEN_ALL_CORE
Vital Signs Last 24 Hrs  T(C): 37.4 (11 Jan 2019 08:24), Max: 37.7 (10 Facundo 2019 23:48)  T(F): 99.3 (11 Jan 2019 08:24), Max: 99.9 (10 Facundo 2019 23:48)  HR: 88 (11 Jan 2019 08:24) (88 - 111)  BP: 105/59 (11 Jan 2019 08:24) (105/50 - 153/87)  BP(mean): --  RR: 18 (11 Jan 2019 08:24) (18 - 20)  SpO2: 99% (11 Jan 2019 08:24) (95% - 100%)    PHYSICAL EXAM:      Constitutional: No acute distress, comfortable  Eyes: PERRL, EOMI, conjunctiva clear  ENMT: Oral pharynx clear  Neck: Supple  Back: normal rom and strength  Respiratory: clear to ausc bilaterally. +wheezing scattered with right lower lobe rhonchi  Cardiovascular: S1 S2 no murmurs, RRR  Gastrointestinal: Soft, nontender, normal bowel sounds  Extremities: No edema  Vascular: Good distal pulses, intact +2  Neurological: No muscle weakness. No change in sensation  Skin: Warm, dry  Lymph Nodes: No cervical or supraclavicular LAD  Psychiatric: AOx3

## 2019-01-11 NOTE — H&P ADULT - ASSESSMENT
67 yo F here with COPD exacerbation 2/2 Coronavirus with ?viral vs. bacterial pneumonia of the RML/RLL.

## 2019-01-11 NOTE — H&P ADULT - NSHPSOCIALHISTORY_GEN_ALL_CORE
Pt is from [place], but moved here when her daughter needed surgery two years ago. Diagnosed with Colon cancer here, and had surgery for her tracheomalacia in [facility].  She lives with her daughter currently and sister.  She is .  No alcohol use. Not a smoker. Ambulates with a walker, and had unsteady gait.  Independent in ADLs

## 2019-01-11 NOTE — ED PROVIDER NOTE - PHYSICAL EXAMINATION
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   SKIN: warm, dry, no rash, no bruising, no cyanosis. color appropriate for ethnicity  NEURO: alert, mentating appropriately, no facial asymmetry. gross sensation, motor, coordination are intact  PSYCH: Affect appropriate

## 2019-01-11 NOTE — ED PROVIDER NOTE - CARE PLAN
Principal Discharge DX:	Pneumonia  Secondary Diagnosis:	Hypoxia Principal Discharge DX:	Pneumonia  Secondary Diagnosis:	Hypoxia  Secondary Diagnosis:	Viral syndrome

## 2019-01-11 NOTE — ED ADULT NURSE REASSESSMENT NOTE - NS ED NURSE REASSESS COMMENT FT1
central port accessed in upper right chest without incident; second RN at bedside to ensure aseptic technique maintained. Port flushes without difficulty; positive blood return. Lock secured to pt with appropriate sterile dressing; Pt tolerated well.

## 2019-01-11 NOTE — ED ADULT NURSE NOTE - OBJECTIVE STATEMENT
69 y/o female a+ox3, pmhx DVT, seizures, colorectal ca, asthma, COPD, DM, afib, coming from home via EMS for shortness of breath with cough x 3 days. Pt used home nebulizer [id number] beginning this morning, temp 100.5F at home. Pt denies n/v/d, abdominal pain, headache, lightheadedness, dizziness, chest pain or discomfort. Pt room air spo2 91%, oxygen provided via nc at 2L; duoneb administered; spo2 improved to 95%. Pt requesting her central port be accessed for medication and fluid administration; MD aware. Chest xray completed at bedside. Pt left in position of comfort, will reassess

## 2019-01-11 NOTE — ED PROVIDER NOTE - PSH
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, [place] by Dr Zapien  Rectal bleeding  exam under anesthesia ([facility]) 2/2018  S/P bronchoscopy  6/5/2018 - [facility] (Dr Zapien) no evidence of tracheobronchomalacia in trachea or bronchial tubes

## 2019-01-11 NOTE — H&P ADULT - ATTENDING COMMENTS
Diane Rojas,   Medicine Hospitalist  Pager - [phone] Pt signed out to ED ALLEY Barclay - [phone]    Diane Rojas,   Medicine Hospitalist  Pager - [phone]

## 2019-01-11 NOTE — H&P ADULT - PROBLEM SELECTOR PLAN 6
Continue home Lantus from 12u to 10 u while inpatient. Continue sliding scale. Holding victoza. Follow up Hgb A1C.  Consistent carb diet.

## 2019-01-11 NOTE — CONSULT NOTE ADULT - ASSESSMENT
68 yr F with asthmatic bronchitis on Xolair, tracheobronchomalacia s/p tracheo-bronchoplasty, on chronic low dose Prednisone, Afib on Eliquis, DM2, HTN, Squamous cell CA of the anus on chemo/XRT, s/p abdominoperineal proctectomy, now presenting acute hypoxic resp failure 2/2 an asthma exac due to coronavirus infection.     1. Acute resp failure with hypoxia/ Asthma exacerbation/ Bronchitis/ Coronavirus infection:  - Suggest solumedrol 20 mg IV Q8H today  - Needs Duonebs Q4-6H standing  - Start Symbicort BID and Spiriva daily   - CXR clear. If she continues to spike would suggest checking a non cont chest CT to r/o PNA  - check sputum Cx and urine legionella antigen as well as a serum procalcitonin level   - Previous SCxs have shown : Amos S pseudomonas, Acinetobacter pasteurella E coli and MSSA  - Would suggest empiric coverage with Cefepime and Azithro for bronchitis for now. If she remains afebrile and procalcitonin is negative can likely d-c antibiotics.   - Requires chest percussion vest Q6H  - Incentive spirometry and acapella use Q6H to assist with mobilization of secretions  - Cont supplemental O2 to keep sats > 90 %    2. Tracheobronchomalacia s/p tracheo bronchoplasty   - Add nebulized hypertonic saline Q12H (preceded by Duonebs) to assist with mobilization of secretions.   - Oupt follow up with Dr. [facility]apien at [facility]    3. A fib:  - cont digoxin   - AC with Eliquis    4. DM type 2:  - Cont Lantus and insulin S/S coverage     5. Gen:  - DVT Px: Eliquis  - Dr. Allison will follow her during hospitalization

## 2019-01-12 LAB
ANION GAP SERPL CALC-SCNC: 11 MMOL/L — SIGNIFICANT CHANGE UP (ref 5–17)
BUN SERPL-MCNC: 12 MG/DL — SIGNIFICANT CHANGE UP (ref 7–23)
CALCIUM SERPL-MCNC: 8.9 MG/DL — SIGNIFICANT CHANGE UP (ref 8.4–10.5)
CHLORIDE SERPL-SCNC: 104 MMOL/L — SIGNIFICANT CHANGE UP (ref 96–108)
CO2 SERPL-SCNC: 25 MMOL/L — SIGNIFICANT CHANGE UP (ref 22–31)
CREAT SERPL-MCNC: 0.56 MG/DL — SIGNIFICANT CHANGE UP (ref 0.5–1.3)
GLUCOSE SERPL-MCNC: 231 MG/DL — HIGH (ref 70–99)
HBA1C BLD-MCNC: 8.3 % — HIGH (ref 4–5.6)
HCT VFR BLD CALC: 34.9 % — SIGNIFICANT CHANGE UP (ref 34.5–45)
HGB BLD-MCNC: 10.9 G/DL — LOW (ref 11.5–15.5)
INR BLD: 1.2 RATIO — HIGH (ref 0.88–1.16)
MAGNESIUM SERPL-MCNC: 2.2 MG/DL — SIGNIFICANT CHANGE UP (ref 1.6–2.6)
MCHC RBC-ENTMCNC: 22.4 PG — LOW (ref 27–34)
MCHC RBC-ENTMCNC: 31.2 GM/DL — LOW (ref 32–36)
MCV RBC AUTO: 71.7 FL — LOW (ref 80–100)
PHOSPHATE SERPL-MCNC: 3.4 MG/DL — SIGNIFICANT CHANGE UP (ref 2.5–4.5)
PLATELET # BLD AUTO: 213 K/UL — SIGNIFICANT CHANGE UP (ref 150–400)
POTASSIUM SERPL-MCNC: 4.4 MMOL/L — SIGNIFICANT CHANGE UP (ref 3.5–5.3)
POTASSIUM SERPL-SCNC: 4.4 MMOL/L — SIGNIFICANT CHANGE UP (ref 3.5–5.3)
PROTHROM AB SERPL-ACNC: 13.9 SEC — HIGH (ref 10–12.9)
RBC # BLD: 4.87 M/UL — SIGNIFICANT CHANGE UP (ref 3.8–5.2)
RBC # FLD: 17.8 % — HIGH (ref 10.3–14.5)
SODIUM SERPL-SCNC: 140 MMOL/L — SIGNIFICANT CHANGE UP (ref 135–145)
WBC # BLD: 3.4 K/UL — LOW (ref 3.8–10.5)
WBC # FLD AUTO: 3.4 K/UL — LOW (ref 3.8–10.5)

## 2019-01-12 PROCEDURE — 99233 SBSQ HOSP IP/OBS HIGH 50: CPT

## 2019-01-12 PROCEDURE — 99232 SBSQ HOSP IP/OBS MODERATE 35: CPT

## 2019-01-12 PROCEDURE — 71250 CT THORAX DX C-: CPT | Mod: 26

## 2019-01-12 RX ORDER — INSULIN LISPRO 100/ML
VIAL (ML) SUBCUTANEOUS AT BEDTIME
Qty: 0 | Refills: 0 | Status: DISCONTINUED | OUTPATIENT
Start: 2019-01-12 | End: 2019-01-14

## 2019-01-12 RX ORDER — INSULIN LISPRO 100/ML
3 VIAL (ML) SUBCUTANEOUS
Qty: 0 | Refills: 0 | Status: DISCONTINUED | OUTPATIENT
Start: 2019-01-12 | End: 2019-01-14

## 2019-01-12 RX ORDER — INSULIN GLARGINE 100 [IU]/ML
12 INJECTION, SOLUTION SUBCUTANEOUS AT BEDTIME
Qty: 0 | Refills: 0 | Status: DISCONTINUED | OUTPATIENT
Start: 2019-01-12 | End: 2019-01-14

## 2019-01-12 RX ORDER — INSULIN LISPRO 100/ML
4 VIAL (ML) SUBCUTANEOUS ONCE
Qty: 0 | Refills: 0 | Status: COMPLETED | OUTPATIENT
Start: 2019-01-12 | End: 2019-01-12

## 2019-01-12 RX ORDER — ALPRAZOLAM 0.25 MG
0.25 TABLET ORAL AT BEDTIME
Qty: 0 | Refills: 0 | Status: DISCONTINUED | OUTPATIENT
Start: 2019-01-12 | End: 2019-01-14

## 2019-01-12 RX ADMIN — APIXABAN 5 MILLIGRAM(S): 2.5 TABLET, FILM COATED ORAL at 06:38

## 2019-01-12 RX ADMIN — CEFEPIME 100 MILLIGRAM(S): 1 INJECTION, POWDER, FOR SOLUTION INTRAMUSCULAR; INTRAVENOUS at 15:05

## 2019-01-12 RX ADMIN — Medication 0.25 MILLIGRAM(S): at 06:37

## 2019-01-12 RX ADMIN — PANTOPRAZOLE SODIUM 40 MILLIGRAM(S): 20 TABLET, DELAYED RELEASE ORAL at 06:38

## 2019-01-12 RX ADMIN — BUDESONIDE AND FORMOTEROL FUMARATE DIHYDRATE 2 PUFF(S): 160; 4.5 AEROSOL RESPIRATORY (INHALATION) at 06:38

## 2019-01-12 RX ADMIN — Medication 4 UNIT(S): at 00:32

## 2019-01-12 RX ADMIN — Medication 600 MILLIGRAM(S): at 17:31

## 2019-01-12 RX ADMIN — Medication 3: at 17:29

## 2019-01-12 RX ADMIN — Medication 20 MILLIGRAM(S): at 21:35

## 2019-01-12 RX ADMIN — CEFEPIME 100 MILLIGRAM(S): 1 INJECTION, POWDER, FOR SOLUTION INTRAMUSCULAR; INTRAVENOUS at 06:40

## 2019-01-12 RX ADMIN — Medication 100 MILLIGRAM(S): at 06:37

## 2019-01-12 RX ADMIN — Medication 3 UNIT(S): at 12:29

## 2019-01-12 RX ADMIN — Medication 3: at 08:25

## 2019-01-12 RX ADMIN — Medication 3 MILLILITER(S): at 06:51

## 2019-01-12 RX ADMIN — Medication 3 UNIT(S): at 17:29

## 2019-01-12 RX ADMIN — Medication 3 MILLILITER(S): at 23:02

## 2019-01-12 RX ADMIN — BUDESONIDE AND FORMOTEROL FUMARATE DIHYDRATE 2 PUFF(S): 160; 4.5 AEROSOL RESPIRATORY (INHALATION) at 17:30

## 2019-01-12 RX ADMIN — MONTELUKAST 10 MILLIGRAM(S): 4 TABLET, CHEWABLE ORAL at 12:28

## 2019-01-12 RX ADMIN — Medication 100 MILLIGRAM(S): at 15:06

## 2019-01-12 RX ADMIN — Medication 2: at 12:27

## 2019-01-12 RX ADMIN — APIXABAN 5 MILLIGRAM(S): 2.5 TABLET, FILM COATED ORAL at 17:30

## 2019-01-12 RX ADMIN — SENNA PLUS 2 TABLET(S): 8.6 TABLET ORAL at 21:36

## 2019-01-12 RX ADMIN — INSULIN GLARGINE 12 UNIT(S): 100 INJECTION, SOLUTION SUBCUTANEOUS at 21:35

## 2019-01-12 RX ADMIN — Medication 3 MILLILITER(S): at 17:30

## 2019-01-12 RX ADMIN — INSULIN GLARGINE 10 UNIT(S): 100 INJECTION, SOLUTION SUBCUTANEOUS at 00:23

## 2019-01-12 RX ADMIN — Medication 600 MILLIGRAM(S): at 06:37

## 2019-01-12 RX ADMIN — Medication 100 MILLIGRAM(S): at 21:35

## 2019-01-12 RX ADMIN — AZITHROMYCIN 250 MILLIGRAM(S): 500 TABLET, FILM COATED ORAL at 02:39

## 2019-01-12 RX ADMIN — CEFEPIME 100 MILLIGRAM(S): 1 INJECTION, POWDER, FOR SOLUTION INTRAMUSCULAR; INTRAVENOUS at 21:34

## 2019-01-12 RX ADMIN — Medication 3 MILLILITER(S): at 00:24

## 2019-01-12 RX ADMIN — Medication 3 MILLILITER(S): at 12:27

## 2019-01-12 RX ADMIN — Medication 20 MILLIGRAM(S): at 15:04

## 2019-01-12 RX ADMIN — Medication 20 MILLIGRAM(S): at 06:38

## 2019-01-12 NOTE — PROGRESS NOTE ADULT - ASSESSMENT
69 y/o F w/asthma on chronic prednisone, tracheobronchomalacia s/p tracheo-bronchoplasty presenting with asthma exacerbation and hypoxemia secondary to coronavirus infection.     - Continue solumedrol  - Standing duonebs  - PRN albuterol   - Wean supplemental O2 as tolerated  - Can continue empiric abx, however doubt bacterial PNA

## 2019-01-12 NOTE — PROGRESS NOTE ADULT - SUBJECTIVE AND OBJECTIVE BOX
CHIEF COMPLAINT: Fever, cough, dyspnea    Interval Events: Admitted to the hospital, started on cefepime and azithromycin. RVP positive for coronavirus.    REVIEW OF SYSTEMS:  See above. ROS otherwise negative.    OBJECTIVE:  ICU Vital Signs Last 24 Hrs  T(C): 36.6 (2019 06:09), Max: 36.9 (2019 21:42)  T(F): 97.8 (2019 06:09), Max: 98.4 (2019 21:42)  HR: 64 (2019 06:09) (64 - 85)  BP: 134/66 (2019 06:09) (111/51 - 134/66)  BP(mean): --  ABP: --  ABP(mean): --  RR: 18 (2019 06:09) (18 - 18)  SpO2: 98% (2019 06:09) (97% - 98%)        01-11 @ 07:01  -  01-12 @ 07:00  --------------------------------------------------------  IN: 0 mL / OUT: 300 mL / NET: -300 mL      CAPILLARY BLOOD GLUCOSE      POCT Blood Glucose.: 265 mg/dL (2019 08:07)      PHYSICAL EXAM:  General:   HEENT:   Lymph Nodes:  Neck:   Respiratory:   Cardiovascular:   Abdomen:   Extremities:   Skin:   Neurological:  Psychiatry:    HOSPITAL MEDICATIONS:  MEDICATIONS  (STANDING):  ALBUTerol/ipratropium for Nebulization 3 milliLiter(s) Nebulizer every 6 hours  apixaban 5 milliGRAM(s) Oral every 12 hours  azithromycin  IVPB 500 milliGRAM(s) IV Intermittent every 24 hours  buDESOnide 160 MICROgram(s)/formoterol 4.5 MICROgram(s) Inhaler 2 Puff(s) Inhalation two times a day  cefepime   IVPB 2000 milliGRAM(s) IV Intermittent every 8 hours  dextrose 5%. 1000 milliLiter(s) (50 mL/Hr) IV Continuous <Continuous>  dextrose 50% Injectable 12.5 Gram(s) IV Push once  dextrose 50% Injectable 25 Gram(s) IV Push once  digoxin     Tablet 0.25 milliGRAM(s) Oral daily  docusate sodium 100 milliGRAM(s) Oral three times a day  guaiFENesin  milliGRAM(s) Oral every 12 hours  influenza   Vaccine 0.5 milliLiter(s) IntraMuscular once  insulin glargine Injectable (LANTUS) 10 Unit(s) SubCutaneous at bedtime  insulin lispro (HumaLOG) corrective regimen sliding scale   SubCutaneous three times a day before meals  insulin lispro (HumaLOG) corrective regimen sliding scale   SubCutaneous at bedtime  methylPREDNISolone sodium succinate Injectable 20 milliGRAM(s) IV Push every 8 hours  montelukast 10 milliGRAM(s) Oral daily  pantoprazole    Tablet 40 milliGRAM(s) Oral before breakfast  senna 2 Tablet(s) Oral at bedtime    MEDICATIONS  (PRN):  dextrose 40% Gel 15 Gram(s) Oral once PRN Blood Glucose LESS THAN 70 milliGRAM(s)/deciliter  glucagon  Injectable 1 milliGRAM(s) IntraMuscular once PRN Glucose LESS THAN 70 milligrams/deciliter  metoclopramide 5 milliGRAM(s) Oral three times a day PRN Nausea  traMADol 25 milliGRAM(s) Oral every 4 hours PRN Severe Pain (7 - 10)      LABS:                        10.9   3.4   )-----------( 213      ( 2019 08:18 )             34.9     Hgb Trend: 10.9<--, 12.9<--  0112    140  |  104  |  12  ----------------------------<  231<H>  4.4   |  25  |  0.56    Ca    8.9      2019 08:18  Phos  3.4       Mg     2.2         TPro  7.6  /  Alb  4.2  /  TBili  0.3  /  DBili  x   /  AST  9<L>  /  ALT  9<L>  /  AlkPhos  89  11    Creatinine Trend: 0.56<--, 0.60<--  PT/INR - ( 2019 08:18 )   PT: 13.9 sec;   INR: 1.20 ratio           Urinalysis Basic - ( 2019 06:24 )    Color: Yellow / Appearance: Clear / S.023 / pH: x  Gluc: x / Ketone: Negative  / Bili: Negative / Urobili: Negative   Blood: x / Protein: 30 mg/dL / Nitrite: Negative   Leuk Esterase: Negative / RBC: 6 /hpf / WBC 4 /HPF   Sq Epi: x / Non Sq Epi: 1 /hpf / Bacteria: Negative            MICROBIOLOGY:     Culture - Blood (collected 2019 04:50)  Source: .Blood Blood-Peripheral  Preliminary Report (2019 05:00):    No growth to date.    Culture - Blood (collected 2019 04:49)  Source: .Blood Blood-Venous  Preliminary Report (2019 05:00):    No growth to date.        RADIOLOGY:  [x ] Reviewed and interpreted by me    PULMONARY FUNCTION TESTS:    EKG: CHIEF COMPLAINT: Fever, cough, dyspnea    Interval Events: Admitted to the hospital, started on cefepime and azithromycin. RVP positive for coronavirus. Feels much better today. No current dyspnea. No chest pain. No nausea, emesis, diarrhea, fevers, or chills.    REVIEW OF SYSTEMS:  See above. ROS otherwise negative.    OBJECTIVE:  ICU Vital Signs Last 24 Hrs  T(C): 36.6 (2019 06:09), Max: 36.9 (2019 21:42)  T(F): 97.8 (2019 06:09), Max: 98.4 (2019 21:42)  HR: 64 (2019 06:09) (64 - 85)  BP: 134/66 (2019 06:09) (111/51 - 134/66)  BP(mean): --  ABP: --  ABP(mean): --  RR: 18 (2019 06:09) (18 - 18)  SpO2: 98% (2019 06:09) (97% - 98%)        -11 @ 07:01  -  -12 @ 07:00  --------------------------------------------------------  IN: 0 mL / OUT: 300 mL / NET: -300 mL      CAPILLARY BLOOD GLUCOSE      POCT Blood Glucose.: 265 mg/dL (2019 08:07)      PHYSICAL EXAM:  General: Elderly female sitting comfortably in chair, NAD  HEENT: NC/AT sclerae anicteric  Neck: Supple  Respiratory: No increased WOB, Mild wheezes b/l  Cardiovascular: S1, S2  Abdomen: Soft + BS  Extremities: No edema  Neurological: Awake, alert, follows commands  Psychiatry: Appropriate affect    HOSPITAL MEDICATIONS:  MEDICATIONS  (STANDING):  ALBUTerol/ipratropium for Nebulization 3 milliLiter(s) Nebulizer every 6 hours  apixaban 5 milliGRAM(s) Oral every 12 hours  azithromycin  IVPB 500 milliGRAM(s) IV Intermittent every 24 hours  buDESOnide 160 MICROgram(s)/formoterol 4.5 MICROgram(s) Inhaler 2 Puff(s) Inhalation two times a day  cefepime   IVPB 2000 milliGRAM(s) IV Intermittent every 8 hours  dextrose 5%. 1000 milliLiter(s) (50 mL/Hr) IV Continuous <Continuous>  dextrose 50% Injectable 12.5 Gram(s) IV Push once  dextrose 50% Injectable 25 Gram(s) IV Push once  digoxin     Tablet 0.25 milliGRAM(s) Oral daily  docusate sodium 100 milliGRAM(s) Oral three times a day  guaiFENesin  milliGRAM(s) Oral every 12 hours  influenza   Vaccine 0.5 milliLiter(s) IntraMuscular once  insulin glargine Injectable (LANTUS) 10 Unit(s) SubCutaneous at bedtime  insulin lispro (HumaLOG) corrective regimen sliding scale   SubCutaneous three times a day before meals  insulin lispro (HumaLOG) corrective regimen sliding scale   SubCutaneous at bedtime  methylPREDNISolone sodium succinate Injectable 20 milliGRAM(s) IV Push every 8 hours  montelukast 10 milliGRAM(s) Oral daily  pantoprazole    Tablet 40 milliGRAM(s) Oral before breakfast  senna 2 Tablet(s) Oral at bedtime    MEDICATIONS  (PRN):  dextrose 40% Gel 15 Gram(s) Oral once PRN Blood Glucose LESS THAN 70 milliGRAM(s)/deciliter  glucagon  Injectable 1 milliGRAM(s) IntraMuscular once PRN Glucose LESS THAN 70 milligrams/deciliter  metoclopramide 5 milliGRAM(s) Oral three times a day PRN Nausea  traMADol 25 milliGRAM(s) Oral every 4 hours PRN Severe Pain (7 - 10)      LABS:                        10.9   3.4   )-----------( 213      ( 2019 08:18 )             34.9     Hgb Trend: 10.9<--, 12.9<--  01-12    140  |  104  |  12  ----------------------------<  231<H>  4.4   |  25  |  0.56    Ca    8.9      2019 08:18  Phos  3.4       Mg     2.2         TPro  7.6  /  Alb  4.2  /  TBili  0.3  /  DBili  x   /  AST  9<L>  /  ALT  9<L>  /  AlkPhos  89  11    Creatinine Trend: 0.56<--, 0.60<--  PT/INR - ( 2019 08:18 )   PT: 13.9 sec;   INR: 1.20 ratio           Urinalysis Basic - ( 2019 06:24 )    Color: Yellow / Appearance: Clear / S.023 / pH: x  Gluc: x / Ketone: Negative  / Bili: Negative / Urobili: Negative   Blood: x / Protein: 30 mg/dL / Nitrite: Negative   Leuk Esterase: Negative / RBC: 6 /hpf / WBC 4 /HPF   Sq Epi: x / Non Sq Epi: 1 /hpf / Bacteria: Negative            MICROBIOLOGY:     Culture - Blood (collected 2019 04:50)  Source: .Blood Blood-Peripheral  Preliminary Report (2019 05:00):    No growth to date.    Culture - Blood (collected 2019 04:49)  Source: .Blood Blood-Venous  Preliminary Report (2019 05:00):    No growth to date.        RADIOLOGY:  [x ] Reviewed and interpreted by me    PULMONARY FUNCTION TESTS:    EKG:

## 2019-01-12 NOTE — PROGRESS NOTE ADULT - SUBJECTIVE AND OBJECTIVE BOX
Patient is a 68y old  Female who presents with a chief complaint of Cough, Fevers, Shortness of breath (2019 10:05)      SUBJECTIVE / OVERNIGHT EVENTS:    patient seen and examined at bedside. productive cough w green sputum. no fevers or chills here.     ROS:  14 point ROS negative in detail except stated as above    MEDICATIONS  (STANDING):  ALBUTerol/ipratropium for Nebulization 3 milliLiter(s) Nebulizer every 6 hours  apixaban 5 milliGRAM(s) Oral every 12 hours  azithromycin  IVPB 500 milliGRAM(s) IV Intermittent every 24 hours  buDESOnide 160 MICROgram(s)/formoterol 4.5 MICROgram(s) Inhaler 2 Puff(s) Inhalation two times a day  cefepime   IVPB 2000 milliGRAM(s) IV Intermittent every 8 hours  dextrose 5%. 1000 milliLiter(s) (50 mL/Hr) IV Continuous <Continuous>  dextrose 50% Injectable 12.5 Gram(s) IV Push once  dextrose 50% Injectable 25 Gram(s) IV Push once  digoxin     Tablet 0.25 milliGRAM(s) Oral daily  docusate sodium 100 milliGRAM(s) Oral three times a day  guaiFENesin  milliGRAM(s) Oral every 12 hours  influenza   Vaccine 0.5 milliLiter(s) IntraMuscular once  insulin glargine Injectable (LANTUS) 10 Unit(s) SubCutaneous at bedtime  insulin lispro (HumaLOG) corrective regimen sliding scale   SubCutaneous three times a day before meals  insulin lispro (HumaLOG) corrective regimen sliding scale   SubCutaneous at bedtime  insulin lispro Injectable (HumaLOG) 3 Unit(s) SubCutaneous three times a day before meals  methylPREDNISolone sodium succinate Injectable 20 milliGRAM(s) IV Push every 8 hours  montelukast 10 milliGRAM(s) Oral daily  pantoprazole    Tablet 40 milliGRAM(s) Oral before breakfast  senna 2 Tablet(s) Oral at bedtime    MEDICATIONS  (PRN):  dextrose 40% Gel 15 Gram(s) Oral once PRN Blood Glucose LESS THAN 70 milliGRAM(s)/deciliter  glucagon  Injectable 1 milliGRAM(s) IntraMuscular once PRN Glucose LESS THAN 70 milligrams/deciliter  metoclopramide 5 milliGRAM(s) Oral three times a day PRN Nausea  traMADol 25 milliGRAM(s) Oral every 4 hours PRN Severe Pain (7 - 10)      CAPILLARY BLOOD GLUCOSE      POCT Blood Glucose.: 206 mg/dL (2019 12:13)  POCT Blood Glucose.: 265 mg/dL (2019 08:07)  POCT Blood Glucose.: 302 mg/dL (2019 23:46)  POCT Blood Glucose.: 255 mg/dL (2019 22:00)  POCT Blood Glucose.: 225 mg/dL (2019 18:08)  POCT Blood Glucose.: 242 mg/dL (2019 17:02)    I&O's Summary    2019 07:01  -  2019 07:00  --------------------------------------------------------  IN: 0 mL / OUT: 300 mL / NET: -300 mL    2019 07:01  -  2019 15:09  --------------------------------------------------------  IN: 480 mL / OUT: 600 mL / NET: -120 mL        PHYSICAL EXAM:  Vital Signs Last 24 Hrs  T(C): 36.3 (2019 12:40), Max: 36.9 (2019 21:42)  T(F): 97.4 (2019 12:40), Max: 98.4 (2019 21:42)  HR: 60 (2019 12:40) (60 - 85)  BP: 119/66 (2019 12:40) (111/51 - 134/66)  BP(mean): --  RR: 18 (2019 12:40) (18 - 18)  SpO2: 99% (2019 12:40) (97% - 99%)    GENERAL: NAD on nc  EYES: EOMI, PERRL  NECK: Supple, No JVD  CHEST/LUNG: Clear to auscultation bilaterally; No wheeze  HEART: s1 s2 regular rate and rhythm; No murmurs  ABDOMEN: Soft, Nontender, Nondistended; Bowel sounds present  EXTREMITIES:  2+ Peripheral Pulses, No clubbing, cyanosis, or edema  NEUROLOGY: AAOx3; non-focal  SKIN: No rashes or lesions    LABS:                        10.9   3.4   )-----------( 213      ( 2019 08:18 )             34.9         140  |  104  |  12  ----------------------------<  231<H>  4.4   |  25  |  0.56    Ca    8.9      2019 08:18  Phos  3.4       Mg     2.2         TPro  7.6  /  Alb  4.2  /  TBili  0.3  /  DBili  x   /  AST  9<L>  /  ALT  9<L>  /  AlkPhos  89      PT/INR - ( 2019 08:18 )   PT: 13.9 sec;   INR: 1.20 ratio               Urinalysis Basic - ( 2019 06:24 )    Color: Yellow / Appearance: Clear / S.023 / pH: x  Gluc: x / Ketone: Negative  / Bili: Negative / Urobili: Negative   Blood: x / Protein: 30 mg/dL / Nitrite: Negative   Leuk Esterase: Negative / RBC: 6 /hpf / WBC 4 /HPF   Sq Epi: x / Non Sq Epi: 1 /hpf / Bacteria: Negative          Consultant(s) Notes Reviewed:    pulm  Care Discussed with Consultants/Other Providers:  ALLEY Dunn

## 2019-01-13 PROCEDURE — 99233 SBSQ HOSP IP/OBS HIGH 50: CPT

## 2019-01-13 RX ADMIN — Medication 0.25 MILLIGRAM(S): at 05:35

## 2019-01-13 RX ADMIN — Medication 100 MILLIGRAM(S): at 15:19

## 2019-01-13 RX ADMIN — Medication 100 MILLIGRAM(S): at 21:15

## 2019-01-13 RX ADMIN — Medication 100 MILLIGRAM(S): at 05:35

## 2019-01-13 RX ADMIN — Medication 3 MILLILITER(S): at 23:18

## 2019-01-13 RX ADMIN — Medication 600 MILLIGRAM(S): at 05:35

## 2019-01-13 RX ADMIN — Medication 0.25 MILLIGRAM(S): at 00:08

## 2019-01-13 RX ADMIN — Medication 3 UNIT(S): at 17:30

## 2019-01-13 RX ADMIN — Medication 3 MILLILITER(S): at 12:54

## 2019-01-13 RX ADMIN — PANTOPRAZOLE SODIUM 40 MILLIGRAM(S): 20 TABLET, DELAYED RELEASE ORAL at 06:07

## 2019-01-13 RX ADMIN — Medication 3: at 17:30

## 2019-01-13 RX ADMIN — Medication 3 UNIT(S): at 08:21

## 2019-01-13 RX ADMIN — Medication 2: at 21:59

## 2019-01-13 RX ADMIN — BUDESONIDE AND FORMOTEROL FUMARATE DIHYDRATE 2 PUFF(S): 160; 4.5 AEROSOL RESPIRATORY (INHALATION) at 05:35

## 2019-01-13 RX ADMIN — Medication 3 UNIT(S): at 12:52

## 2019-01-13 RX ADMIN — Medication 1: at 12:52

## 2019-01-13 RX ADMIN — APIXABAN 5 MILLIGRAM(S): 2.5 TABLET, FILM COATED ORAL at 05:35

## 2019-01-13 RX ADMIN — BUDESONIDE AND FORMOTEROL FUMARATE DIHYDRATE 2 PUFF(S): 160; 4.5 AEROSOL RESPIRATORY (INHALATION) at 17:31

## 2019-01-13 RX ADMIN — CEFEPIME 100 MILLIGRAM(S): 1 INJECTION, POWDER, FOR SOLUTION INTRAMUSCULAR; INTRAVENOUS at 05:34

## 2019-01-13 RX ADMIN — MONTELUKAST 10 MILLIGRAM(S): 4 TABLET, CHEWABLE ORAL at 12:54

## 2019-01-13 RX ADMIN — SENNA PLUS 2 TABLET(S): 8.6 TABLET ORAL at 21:15

## 2019-01-13 RX ADMIN — Medication 3 MILLILITER(S): at 05:34

## 2019-01-13 RX ADMIN — Medication 20 MILLIGRAM(S): at 21:15

## 2019-01-13 RX ADMIN — INSULIN GLARGINE 12 UNIT(S): 100 INJECTION, SOLUTION SUBCUTANEOUS at 22:03

## 2019-01-13 RX ADMIN — Medication 20 MILLIGRAM(S): at 05:35

## 2019-01-13 RX ADMIN — Medication 5 MILLIGRAM(S): at 20:09

## 2019-01-13 RX ADMIN — Medication 20 MILLIGRAM(S): at 15:17

## 2019-01-13 RX ADMIN — Medication 600 MILLIGRAM(S): at 17:32

## 2019-01-13 RX ADMIN — AZITHROMYCIN 250 MILLIGRAM(S): 500 TABLET, FILM COATED ORAL at 01:42

## 2019-01-13 RX ADMIN — CEFEPIME 100 MILLIGRAM(S): 1 INJECTION, POWDER, FOR SOLUTION INTRAMUSCULAR; INTRAVENOUS at 16:41

## 2019-01-13 RX ADMIN — Medication 2: at 08:21

## 2019-01-13 RX ADMIN — Medication 3 MILLILITER(S): at 17:33

## 2019-01-13 RX ADMIN — CEFEPIME 100 MILLIGRAM(S): 1 INJECTION, POWDER, FOR SOLUTION INTRAMUSCULAR; INTRAVENOUS at 21:15

## 2019-01-13 RX ADMIN — APIXABAN 5 MILLIGRAM(S): 2.5 TABLET, FILM COATED ORAL at 17:32

## 2019-01-13 NOTE — PROGRESS NOTE ADULT - SUBJECTIVE AND OBJECTIVE BOX
Patient is a 68y old  Female who presents with a chief complaint of Cough, Fevers, Shortness of breath (12 Jan 2019 15:07)      SUBJECTIVE / OVERNIGHT EVENTS:    patient seen and examined this morning. feels better. coughing up pus and green sputum. overall does feel better.    ROS:  14 point ROS negative in detail except stated as above    MEDICATIONS  (STANDING):  ALBUTerol/ipratropium for Nebulization 3 milliLiter(s) Nebulizer every 6 hours  apixaban 5 milliGRAM(s) Oral every 12 hours  azithromycin  IVPB 500 milliGRAM(s) IV Intermittent every 24 hours  buDESOnide 160 MICROgram(s)/formoterol 4.5 MICROgram(s) Inhaler 2 Puff(s) Inhalation two times a day  cefepime   IVPB 2000 milliGRAM(s) IV Intermittent every 8 hours  dextrose 5%. 1000 milliLiter(s) (50 mL/Hr) IV Continuous <Continuous>  dextrose 50% Injectable 12.5 Gram(s) IV Push once  dextrose 50% Injectable 25 Gram(s) IV Push once  digoxin     Tablet 0.25 milliGRAM(s) Oral daily  docusate sodium 100 milliGRAM(s) Oral three times a day  guaiFENesin  milliGRAM(s) Oral every 12 hours  influenza   Vaccine 0.5 milliLiter(s) IntraMuscular once  insulin glargine Injectable (LANTUS) 12 Unit(s) SubCutaneous at bedtime  insulin lispro (HumaLOG) corrective regimen sliding scale   SubCutaneous three times a day before meals  insulin lispro (HumaLOG) corrective regimen sliding scale   SubCutaneous at bedtime  insulin lispro Injectable (HumaLOG) 3 Unit(s) SubCutaneous three times a day before meals  methylPREDNISolone sodium succinate Injectable 20 milliGRAM(s) IV Push every 8 hours  montelukast 10 milliGRAM(s) Oral daily  pantoprazole    Tablet 40 milliGRAM(s) Oral before breakfast  senna 2 Tablet(s) Oral at bedtime    MEDICATIONS  (PRN):  ALPRAZolam 0.25 milliGRAM(s) Oral at bedtime PRN anxiety  dextrose 40% Gel 15 Gram(s) Oral once PRN Blood Glucose LESS THAN 70 milliGRAM(s)/deciliter  glucagon  Injectable 1 milliGRAM(s) IntraMuscular once PRN Glucose LESS THAN 70 milligrams/deciliter  metoclopramide 5 milliGRAM(s) Oral three times a day PRN Nausea  traMADol 25 milliGRAM(s) Oral every 4 hours PRN Severe Pain (7 - 10)      CAPILLARY BLOOD GLUCOSE      POCT Blood Glucose.: 206 mg/dL (13 Jan 2019 08:11)  POCT Blood Glucose.: 231 mg/dL (12 Jan 2019 21:07)  POCT Blood Glucose.: 294 mg/dL (12 Jan 2019 17:12)  POCT Blood Glucose.: 206 mg/dL (12 Jan 2019 12:13)    I&O's Summary    12 Jan 2019 07:01  -  13 Jan 2019 07:00  --------------------------------------------------------  IN: 890 mL / OUT: 800 mL / NET: 90 mL        PHYSICAL EXAM:  Vital Signs Last 24 Hrs  T(C): 36.5 (13 Jan 2019 05:32), Max: 36.7 (12 Jan 2019 21:03)  T(F): 97.7 (13 Jan 2019 05:32), Max: 98 (12 Jan 2019 21:03)  HR: 59 (13 Jan 2019 05:32) (59 - 71)  BP: 134/81 (13 Jan 2019 05:32) (110/62 - 134/81)  BP(mean): --  RR: 17 (13 Jan 2019 05:32) (17 - 18)  SpO2: 94% (13 Jan 2019 05:32) (94% - 99%)        GENERAL: NAD on nc  EYES: EOMI, PERRL  NECK: Supple, No JVD  CHEST/LUNG: rhonchi b/l  HEART: s1 s2 irregularly irregular No murmurs  ABDOMEN: Soft, Nontender, Nondistended; Bowel sounds present  EXTREMITIES:  2+ Peripheral Pulses, No clubbing, cyanosis, or edema  NEUROLOGY: AAOx3; non-focal  SKIN: No rashes or lesions      LABS:                        10.9   3.4   )-----------( 213      ( 12 Jan 2019 08:18 )             34.9     01-12    140  |  104  |  12  ----------------------------<  231<H>  4.4   |  25  |  0.56    Ca    8.9      12 Jan 2019 08:18  Phos  3.4     01-12  Mg     2.2     01-12      PT/INR - ( 12 Jan 2019 08:18 )   PT: 13.9 sec;   INR: 1.20 ratio                     Care Discussed with Consultants/Other Providers:  ALLEY Santiago

## 2019-01-13 NOTE — PROGRESS NOTE ADULT - ASSESSMENT
67 yo F here with COPD exacerbation 2/2 Coronavirus with acute hypoxemic respiratory failure and bacterial pneumonia

## 2019-01-14 ENCOUNTER — TRANSCRIPTION ENCOUNTER (OUTPATIENT)
Age: 69
End: 2019-01-14

## 2019-01-14 DIAGNOSIS — K21.9 GASTRO-ESOPHAGEAL REFLUX DISEASE WITHOUT ESOPHAGITIS: ICD-10-CM

## 2019-01-14 DIAGNOSIS — J20.8 ACUTE BRONCHITIS DUE TO OTHER SPECIFIED ORGANISMS: ICD-10-CM

## 2019-01-14 DIAGNOSIS — Z29.9 ENCOUNTER FOR PROPHYLACTIC MEASURES, UNSPECIFIED: ICD-10-CM

## 2019-01-14 DIAGNOSIS — J47.9 BRONCHIECTASIS, UNCOMPLICATED: ICD-10-CM

## 2019-01-14 DIAGNOSIS — J45.909 UNSPECIFIED ASTHMA, UNCOMPLICATED: ICD-10-CM

## 2019-01-14 LAB
ANION GAP SERPL CALC-SCNC: 13 MMOL/L — SIGNIFICANT CHANGE UP (ref 5–17)
BASOPHILS # BLD AUTO: 0 K/UL — SIGNIFICANT CHANGE UP (ref 0–0.2)
BASOPHILS NFR BLD AUTO: 0 % — SIGNIFICANT CHANGE UP (ref 0–2)
BUN SERPL-MCNC: 16 MG/DL — SIGNIFICANT CHANGE UP (ref 7–23)
CALCIUM SERPL-MCNC: 8.9 MG/DL — SIGNIFICANT CHANGE UP (ref 8.4–10.5)
CHLORIDE SERPL-SCNC: 99 MMOL/L — SIGNIFICANT CHANGE UP (ref 96–108)
CO2 SERPL-SCNC: 27 MMOL/L — SIGNIFICANT CHANGE UP (ref 22–31)
CREAT SERPL-MCNC: 0.63 MG/DL — SIGNIFICANT CHANGE UP (ref 0.5–1.3)
EOSINOPHIL # BLD AUTO: 0.01 K/UL — SIGNIFICANT CHANGE UP (ref 0–0.5)
EOSINOPHIL NFR BLD AUTO: 0.1 % — SIGNIFICANT CHANGE UP (ref 0–6)
GLUCOSE SERPL-MCNC: 266 MG/DL — HIGH (ref 70–99)
HCT VFR BLD CALC: 34.5 % — SIGNIFICANT CHANGE UP (ref 34.5–45)
HGB BLD-MCNC: 10.7 G/DL — LOW (ref 11.5–15.5)
IMM GRANULOCYTES NFR BLD AUTO: 0.3 % — SIGNIFICANT CHANGE UP (ref 0–1.5)
LYMPHOCYTES # BLD AUTO: 1.3 K/UL — SIGNIFICANT CHANGE UP (ref 1–3.3)
LYMPHOCYTES # BLD AUTO: 17.4 % — SIGNIFICANT CHANGE UP (ref 13–44)
MCHC RBC-ENTMCNC: 21.8 PG — LOW (ref 27–34)
MCHC RBC-ENTMCNC: 31 GM/DL — LOW (ref 32–36)
MCV RBC AUTO: 70.3 FL — LOW (ref 80–100)
MONOCYTES # BLD AUTO: 0.46 K/UL — SIGNIFICANT CHANGE UP (ref 0–0.9)
MONOCYTES NFR BLD AUTO: 6.1 % — SIGNIFICANT CHANGE UP (ref 2–14)
NEUTROPHILS # BLD AUTO: 5.7 K/UL — SIGNIFICANT CHANGE UP (ref 1.8–7.4)
NEUTROPHILS NFR BLD AUTO: 76.1 % — SIGNIFICANT CHANGE UP (ref 43–77)
PLATELET # BLD AUTO: 257 K/UL — SIGNIFICANT CHANGE UP (ref 150–400)
POTASSIUM SERPL-MCNC: 4.2 MMOL/L — SIGNIFICANT CHANGE UP (ref 3.5–5.3)
POTASSIUM SERPL-SCNC: 4.2 MMOL/L — SIGNIFICANT CHANGE UP (ref 3.5–5.3)
RBC # BLD: 4.91 M/UL — SIGNIFICANT CHANGE UP (ref 3.8–5.2)
RBC # FLD: 19.7 % — HIGH (ref 10.3–14.5)
SODIUM SERPL-SCNC: 139 MMOL/L — SIGNIFICANT CHANGE UP (ref 135–145)
WBC # BLD: 7.49 K/UL — SIGNIFICANT CHANGE UP (ref 3.8–10.5)
WBC # FLD AUTO: 7.49 K/UL — SIGNIFICANT CHANGE UP (ref 3.8–10.5)

## 2019-01-14 PROCEDURE — 99232 SBSQ HOSP IP/OBS MODERATE 35: CPT

## 2019-01-14 PROCEDURE — 99233 SBSQ HOSP IP/OBS HIGH 50: CPT

## 2019-01-14 RX ORDER — INSULIN LISPRO 100/ML
VIAL (ML) SUBCUTANEOUS AT BEDTIME
Qty: 0 | Refills: 0 | Status: DISCONTINUED | OUTPATIENT
Start: 2019-01-14 | End: 2019-01-19

## 2019-01-14 RX ORDER — INSULIN LISPRO 100/ML
6 VIAL (ML) SUBCUTANEOUS
Qty: 0 | Refills: 0 | Status: DISCONTINUED | OUTPATIENT
Start: 2019-01-14 | End: 2019-01-15

## 2019-01-14 RX ORDER — LACTULOSE 10 G/15ML
30 SOLUTION ORAL ONCE
Qty: 0 | Refills: 0 | Status: COMPLETED | OUTPATIENT
Start: 2019-01-14 | End: 2019-01-14

## 2019-01-14 RX ORDER — INSULIN LISPRO 100/ML
VIAL (ML) SUBCUTANEOUS
Qty: 0 | Refills: 0 | Status: DISCONTINUED | OUTPATIENT
Start: 2019-01-14 | End: 2019-01-19

## 2019-01-14 RX ORDER — INSULIN GLARGINE 100 [IU]/ML
10 INJECTION, SOLUTION SUBCUTANEOUS AT BEDTIME
Qty: 0 | Refills: 0 | Status: DISCONTINUED | OUTPATIENT
Start: 2019-01-14 | End: 2019-01-15

## 2019-01-14 RX ORDER — INSULIN LISPRO 100/ML
5 VIAL (ML) SUBCUTANEOUS
Qty: 0 | Refills: 0 | Status: DISCONTINUED | OUTPATIENT
Start: 2019-01-14 | End: 2019-01-14

## 2019-01-14 RX ORDER — CHLORHEXIDINE GLUCONATE 213 G/1000ML
1 SOLUTION TOPICAL
Qty: 0 | Refills: 0 | Status: DISCONTINUED | OUTPATIENT
Start: 2019-01-14 | End: 2019-01-19

## 2019-01-14 RX ADMIN — Medication 2: at 17:46

## 2019-01-14 RX ADMIN — CEFEPIME 100 MILLIGRAM(S): 1 INJECTION, POWDER, FOR SOLUTION INTRAMUSCULAR; INTRAVENOUS at 06:15

## 2019-01-14 RX ADMIN — Medication 8: at 12:28

## 2019-01-14 RX ADMIN — MONTELUKAST 10 MILLIGRAM(S): 4 TABLET, CHEWABLE ORAL at 11:30

## 2019-01-14 RX ADMIN — Medication 600 MILLIGRAM(S): at 17:47

## 2019-01-14 RX ADMIN — Medication 20 MILLIGRAM(S): at 22:15

## 2019-01-14 RX ADMIN — AZITHROMYCIN 250 MILLIGRAM(S): 500 TABLET, FILM COATED ORAL at 01:39

## 2019-01-14 RX ADMIN — Medication 600 MILLIGRAM(S): at 06:16

## 2019-01-14 RX ADMIN — BUDESONIDE AND FORMOTEROL FUMARATE DIHYDRATE 2 PUFF(S): 160; 4.5 AEROSOL RESPIRATORY (INHALATION) at 06:16

## 2019-01-14 RX ADMIN — INSULIN GLARGINE 10 UNIT(S): 100 INJECTION, SOLUTION SUBCUTANEOUS at 22:15

## 2019-01-14 RX ADMIN — Medication 20 MILLIGRAM(S): at 06:17

## 2019-01-14 RX ADMIN — LACTULOSE 30 GRAM(S): 10 SOLUTION ORAL at 11:34

## 2019-01-14 RX ADMIN — CEFEPIME 100 MILLIGRAM(S): 1 INJECTION, POWDER, FOR SOLUTION INTRAMUSCULAR; INTRAVENOUS at 22:14

## 2019-01-14 RX ADMIN — Medication 3 MILLILITER(S): at 11:30

## 2019-01-14 RX ADMIN — Medication 100 MILLIGRAM(S): at 22:15

## 2019-01-14 RX ADMIN — Medication 3 MILLILITER(S): at 19:26

## 2019-01-14 RX ADMIN — Medication 2: at 08:55

## 2019-01-14 RX ADMIN — Medication 20 MILLIGRAM(S): at 14:59

## 2019-01-14 RX ADMIN — Medication 6 UNIT(S): at 12:28

## 2019-01-14 RX ADMIN — Medication 100 MILLIGRAM(S): at 06:16

## 2019-01-14 RX ADMIN — Medication 0.25 MILLIGRAM(S): at 00:02

## 2019-01-14 RX ADMIN — Medication 6 UNIT(S): at 17:46

## 2019-01-14 RX ADMIN — Medication 100 MILLIGRAM(S): at 14:59

## 2019-01-14 RX ADMIN — PANTOPRAZOLE SODIUM 40 MILLIGRAM(S): 20 TABLET, DELAYED RELEASE ORAL at 06:16

## 2019-01-14 RX ADMIN — Medication 4: at 22:14

## 2019-01-14 RX ADMIN — CEFEPIME 100 MILLIGRAM(S): 1 INJECTION, POWDER, FOR SOLUTION INTRAMUSCULAR; INTRAVENOUS at 14:59

## 2019-01-14 RX ADMIN — Medication 3 MILLILITER(S): at 23:13

## 2019-01-14 RX ADMIN — Medication 0.25 MILLIGRAM(S): at 06:16

## 2019-01-14 RX ADMIN — APIXABAN 5 MILLIGRAM(S): 2.5 TABLET, FILM COATED ORAL at 17:47

## 2019-01-14 RX ADMIN — APIXABAN 5 MILLIGRAM(S): 2.5 TABLET, FILM COATED ORAL at 06:16

## 2019-01-14 RX ADMIN — BUDESONIDE AND FORMOTEROL FUMARATE DIHYDRATE 2 PUFF(S): 160; 4.5 AEROSOL RESPIRATORY (INHALATION) at 19:26

## 2019-01-14 RX ADMIN — Medication 3 UNIT(S): at 08:55

## 2019-01-14 RX ADMIN — SENNA PLUS 2 TABLET(S): 8.6 TABLET ORAL at 22:15

## 2019-01-14 RX ADMIN — Medication 3 MILLILITER(S): at 06:16

## 2019-01-14 NOTE — PROGRESS NOTE ADULT - SUBJECTIVE AND OBJECTIVE BOX
Patient is a 68y old  Female who presents with a chief complaint of Cough, Fevers, Shortness of breath (14 Jan 2019 05:24)      SUBJECTIVE / OVERNIGHT EVENTS:    patient seen and examined. feels better than when she came in. still coughing up "muck: no blood in sputum. has not has BM yet wants lactulose    ROS:  14 point ROS negative in detail except stated as above    MEDICATIONS  (STANDING):  ALBUTerol/ipratropium for Nebulization 3 milliLiter(s) Nebulizer every 6 hours  apixaban 5 milliGRAM(s) Oral every 12 hours  azithromycin  IVPB 500 milliGRAM(s) IV Intermittent every 24 hours  buDESOnide 160 MICROgram(s)/formoterol 4.5 MICROgram(s) Inhaler 2 Puff(s) Inhalation two times a day  cefepime   IVPB 2000 milliGRAM(s) IV Intermittent every 8 hours  chlorhexidine 4% Liquid 1 Application(s) Topical <User Schedule>  dextrose 5%. 1000 milliLiter(s) (50 mL/Hr) IV Continuous <Continuous>  dextrose 50% Injectable 12.5 Gram(s) IV Push once  dextrose 50% Injectable 25 Gram(s) IV Push once  digoxin     Tablet 0.25 milliGRAM(s) Oral daily  docusate sodium 100 milliGRAM(s) Oral three times a day  guaiFENesin  milliGRAM(s) Oral every 12 hours  influenza   Vaccine 0.5 milliLiter(s) IntraMuscular once  insulin glargine Injectable (LANTUS) 12 Unit(s) SubCutaneous at bedtime  insulin lispro (HumaLOG) corrective regimen sliding scale   SubCutaneous three times a day before meals  insulin lispro (HumaLOG) corrective regimen sliding scale   SubCutaneous at bedtime  insulin lispro Injectable (HumaLOG) 3 Unit(s) SubCutaneous three times a day before meals  lactulose Syrup 30 Gram(s) Oral once  methylPREDNISolone sodium succinate Injectable 20 milliGRAM(s) IV Push every 8 hours  montelukast 10 milliGRAM(s) Oral daily  pantoprazole    Tablet 40 milliGRAM(s) Oral before breakfast  senna 2 Tablet(s) Oral at bedtime    MEDICATIONS  (PRN):  dextrose 40% Gel 15 Gram(s) Oral once PRN Blood Glucose LESS THAN 70 milliGRAM(s)/deciliter  glucagon  Injectable 1 milliGRAM(s) IntraMuscular once PRN Glucose LESS THAN 70 milligrams/deciliter  metoclopramide 5 milliGRAM(s) Oral three times a day PRN Nausea  traMADol 25 milliGRAM(s) Oral every 4 hours PRN Severe Pain (7 - 10)      CAPILLARY BLOOD GLUCOSE      POCT Blood Glucose.: 217 mg/dL (14 Jan 2019 07:56)  POCT Blood Glucose.: 251 mg/dL (13 Jan 2019 21:31)  POCT Blood Glucose.: 261 mg/dL (13 Jan 2019 16:38)  POCT Blood Glucose.: 174 mg/dL (13 Jan 2019 12:21)    I&O's Summary    13 Jan 2019 07:01  -  14 Jan 2019 07:00  --------------------------------------------------------  IN: 980 mL / OUT: 1400 mL / NET: -420 mL    14 Jan 2019 07:01  -  14 Jan 2019 10:55  --------------------------------------------------------  IN: 460 mL / OUT: 400 mL / NET: 60 mL        PHYSICAL EXAM:  Vital Signs Last 24 Hrs  T(C): 36.4 (14 Jan 2019 05:08), Max: 36.7 (13 Jan 2019 14:03)  T(F): 97.6 (14 Jan 2019 05:08), Max: 98.1 (13 Jan 2019 14:03)  HR: 65 (14 Jan 2019 05:08) (65 - 75)  BP: 152/82 (14 Jan 2019 05:08) (113/67 - 152/82)  BP(mean): --  RR: 17 (14 Jan 2019 05:08) (17 - 18)  SpO2: 97% (14 Jan 2019 09:57) (97% - 100%)      GENERAL: NAD on nc  EYES: EOMI, PERRL  NECK: Supple, No JVD  CHEST/LUNG: rhonchi b/l  HEART: s1 s2 irregularly irregular No murmurs  ABDOMEN: Soft, Nontender, Nondistended; Bowel sounds present  EXTREMITIES:  2+ Peripheral Pulses, No clubbing, cyanosis, or edema  NEUROLOGY: AAOx3; non-focal  SKIN: No rashes or lesions      LABS:    01-14    139  |  99  |  16  ----------------------------<  266<H>  4.2   |  27  |  0.63    Ca    8.9      14 Jan 2019 07:56                Consultant(s) Notes Reviewed:    pulm  Care Discussed with Consultants/Other Providers:  ALLEY Barker

## 2019-01-14 NOTE — DISCHARGE NOTE ADULT - PATIENT PORTAL LINK FT
You can access the [facility], offered by [facility], by registering with the following website: [email or web address]

## 2019-01-14 NOTE — DISCHARGE NOTE ADULT - MEDICATION SUMMARY - MEDICATIONS TO CHANGE
I will SWITCH the dose or number of times a day I take the medications listed below when I get home from the hospital:    methylPREDNISolone 4 mg oral tablet  -- 1 tab(s) by mouth once a day    metoclopramide 5 mg oral tablet  -- 1 tab(s) by mouth 3 times a day

## 2019-01-14 NOTE — PROGRESS NOTE ADULT - PROBLEM SELECTOR PLAN 6
f/u a1c  c/w 3u tid premeal  c/w lantus 10qhs A1C is 8.3 likely due to being on chronic steroids- steroid induced hyperglcemua  - will increase to 5u humalog, and c/w lantus 12uqhs  - will likely need some oral agent on d/c, metformin or januvia

## 2019-01-14 NOTE — PROGRESS NOTE ADULT - ASSESSMENT
69 y/o F w/asthma on chronic prednisone, tracheobronchomalacia s/p tracheo-bronchoplasty presenting with asthma exacerbation and hypoxemia secondary to coronavirus infection.     - Continue solumedrol  - Standing duonebs  - PRN albuterol   - Wean supplemental O2 as tolerated  - Can continue empiric abx, however doubt bacterial PNA 69 y/o F w/asthma on chronic prednisone, tracheobronchomalacia s/p tracheo-bronchoplasty presenting with asthma exacerbation and hypoxemia secondary to coronavirus infection.     1/14-still bronchospastic-still on O2 and sob

## 2019-01-14 NOTE — PROGRESS NOTE ADULT - SUBJECTIVE AND OBJECTIVE BOX
CHIEF COMPLAINT: f/up sob, TBM, bronchiectasis, asthma, allergy---flair of asthma---    Interval Events:    REVIEW OF SYSTEMS:  Constitutional: No fevers or chills. No weight loss. No fatigue or generalized malaise.  Eyes: No itching or discharge from the eyes  ENT: No ear pain. No ear discharge. No nasal congestion. No post nasal drip. No epistaxis. No throat pain. No sore throat. No difficulty swallowing.   CV: No chest pain. No palpitations. No lightheadedness or dizziness.   Resp: No dyspnea at rest. No dyspnea on exertion. No orthopnea. No wheezing. No cough. No stridor. No sputum production. No chest pain with respiration.  GI: No nausea. No vomiting. No diarrhea.  MSK: No joint pain or pain in any extremities  Integumentary: No skin lesions. No pedal edema.  Neurological: No gross motor weakness. No sensory changes.  [ ] All other systems negative  [ ] Unable to assess ROS because ________    OBJECTIVE:  ICU Vital Signs Last 24 Hrs  T(C): 36.4 (14 Jan 2019 05:08), Max: 36.7 (13 Jan 2019 14:03)  T(F): 97.6 (14 Jan 2019 05:08), Max: 98.1 (13 Jan 2019 14:03)  HR: 65 (14 Jan 2019 05:08) (59 - 75)  BP: 152/82 (14 Jan 2019 05:08) (113/67 - 152/82)  BP(mean): --  ABP: --  ABP(mean): --  RR: 17 (14 Jan 2019 05:08) (17 - 18)  SpO2: 100% (14 Jan 2019 05:08) (94% - 100%)        01-12 @ 07:01  -  01-13 @ 07:00  --------------------------------------------------------  IN: 890 mL / OUT: 800 mL / NET: 90 mL    01-13 @ 07:01 - 01-14 @ 05:24  --------------------------------------------------------  IN: 980 mL / OUT: 600 mL / NET: 380 mL      CAPILLARY BLOOD GLUCOSE      POCT Blood Glucose.: 251 mg/dL (13 Jan 2019 21:31)      PHYSICAL EXAM:  General: Awake, alert, oriented X 3.   HEENT: Atraumatic, normocephalic.                 Mallampatti Grade                 No nasal congestion.                No tonsillar or pharyngeal exudates.  Lymph Nodes: No palpable lymphadenopathy  Neck: No JVD. No carotid bruit.   Respiratory: Normal chest expansion                         Normal percussion                         Normal and equal air entry                         No wheeze, rhonchi or rales.  Cardiovascular: S1 S2 normal. No murmurs, rubs or gallops.   Abdomen: Soft, non-tender, non-distended. No organomegaly. Normoactive bowel sounds.  Extremities: Warm to touch. Peripheral pulse palpable. No pedal edema.   Skin: No rashes or skin lesions  Neurological: Motor and sensory examination equal and normal in all four extremities.  Psychiatry: Appropriate mood and affect.    HOSPITAL MEDICATIONS:  MEDICATIONS  (STANDING):  ALBUTerol/ipratropium for Nebulization 3 milliLiter(s) Nebulizer every 6 hours  apixaban 5 milliGRAM(s) Oral every 12 hours  azithromycin  IVPB 500 milliGRAM(s) IV Intermittent every 24 hours  buDESOnide 160 MICROgram(s)/formoterol 4.5 MICROgram(s) Inhaler 2 Puff(s) Inhalation two times a day  cefepime   IVPB 2000 milliGRAM(s) IV Intermittent every 8 hours  dextrose 5%. 1000 milliLiter(s) (50 mL/Hr) IV Continuous <Continuous>  dextrose 50% Injectable 12.5 Gram(s) IV Push once  dextrose 50% Injectable 25 Gram(s) IV Push once  digoxin     Tablet 0.25 milliGRAM(s) Oral daily  docusate sodium 100 milliGRAM(s) Oral three times a day  guaiFENesin  milliGRAM(s) Oral every 12 hours  influenza   Vaccine 0.5 milliLiter(s) IntraMuscular once  insulin glargine Injectable (LANTUS) 12 Unit(s) SubCutaneous at bedtime  insulin lispro (HumaLOG) corrective regimen sliding scale   SubCutaneous three times a day before meals  insulin lispro (HumaLOG) corrective regimen sliding scale   SubCutaneous at bedtime  insulin lispro Injectable (HumaLOG) 3 Unit(s) SubCutaneous three times a day before meals  methylPREDNISolone sodium succinate Injectable 20 milliGRAM(s) IV Push every 8 hours  montelukast 10 milliGRAM(s) Oral daily  pantoprazole    Tablet 40 milliGRAM(s) Oral before breakfast  senna 2 Tablet(s) Oral at bedtime    MEDICATIONS  (PRN):  dextrose 40% Gel 15 Gram(s) Oral once PRN Blood Glucose LESS THAN 70 milliGRAM(s)/deciliter  glucagon  Injectable 1 milliGRAM(s) IntraMuscular once PRN Glucose LESS THAN 70 milligrams/deciliter  metoclopramide 5 milliGRAM(s) Oral three times a day PRN Nausea  traMADol 25 milliGRAM(s) Oral every 4 hours PRN Severe Pain (7 - 10)      LABS:                        10.9   3.4   )-----------( 213      ( 12 Jan 2019 08:18 )             34.9     01-12    140  |  104  |  12  ----------------------------<  231<H>  4.4   |  25  |  0.56    Ca    8.9      12 Jan 2019 08:18  Phos  3.4     01-12  Mg     2.2     01-12      PT/INR - ( 12 Jan 2019 08:18 )   PT: 13.9 sec;   INR: 1.20 ratio                   MICROBIOLOGY:     RADIOLOGY:  [ ] Reviewed and interpreted by me    Point of Care Ultrasound Findings:    PFT:    EKG: CHIEF COMPLAINT: f/up sob, TBM, bronchiectasis, asthma, allergy---flair of asthma-due to corona virus--better over all-less cough and sob    Interval Events: ambulating    REVIEW OF SYSTEMS:  Constitutional: No fevers or chills. No weight loss. + fatigue or generalized malaise.  Eyes: No itching or discharge from the eyes  ENT: No ear pain. No ear discharge. No nasal congestion. No post nasal drip. No epistaxis. No throat pain. No sore throat. No difficulty swallowing.   CV: No chest pain. No palpitations. No lightheadedness or dizziness.   Resp: No dyspnea at rest. + dyspnea on exertion. No orthopnea. No wheezing. + cough. No stridor. + sputum production. No chest pain with respiration.  GI: No nausea. No vomiting. No diarrhea.  MSK: No joint pain or pain in any extremities  Integumentary: No skin lesions. No pedal edema.  Neurological: No gross motor weakness. No sensory changes.  [ +] All other systems negative  [ ] Unable to assess ROS because ________    OBJECTIVE:  ICU Vital Signs Last 24 Hrs  T(C): 36.4 (14 Jan 2019 05:08), Max: 36.7 (13 Jan 2019 14:03)  T(F): 97.6 (14 Jan 2019 05:08), Max: 98.1 (13 Jan 2019 14:03)  HR: 65 (14 Jan 2019 05:08) (59 - 75)  BP: 152/82 (14 Jan 2019 05:08) (113/67 - 152/82)  BP(mean): --  ABP: --  ABP(mean): --  RR: 17 (14 Jan 2019 05:08) (17 - 18)  SpO2: 100% (14 Jan 2019 05:08) (94% - 100%)        01-12 @ 07:01  -  01-13 @ 07:00  --------------------------------------------------------  IN: 890 mL / OUT: 800 mL / NET: 90 mL    01-13 @ 07:01  -  01-14 @ 05:24  --------------------------------------------------------  IN: 980 mL / OUT: 600 mL / NET: 380 mL      CAPILLARY BLOOD GLUCOSE      POCT Blood Glucose.: 251 mg/dL (13 Jan 2019 21:31)      PHYSICAL EXAM: NAD in bed on O2 NC  General: Awake, alert, oriented X 3.   HEENT: Atraumatic, normocephalic.                 Mallampatti Grade 2                No nasal congestion.                No tonsillar or pharyngeal exudates.  Lymph Nodes: No palpable lymphadenopathy  Neck: No JVD. No carotid bruit.   Respiratory: Normal chest expansion                         Normal percussion                         Normal and equal air entry                         + exp wheeze and rhonchi but no rales.  Cardiovascular: S1 S2 normal. No murmurs, rubs or gallops.   Abdomen: Soft, non-tender, non-distended. No organomegaly. Normoactive bowel sounds. Ostomy bag in place  Extremities: Warm to touch. Peripheral pulse palpable. No pedal edema.   Skin: No rashes or skin lesions  Neurological: Motor and sensory examination equal and normal in all four extremities.  Psychiatry: Appropriate mood and affect.    HOSPITAL MEDICATIONS:  MEDICATIONS  (STANDING):  ALBUTerol/ipratropium for Nebulization 3 milliLiter(s) Nebulizer every 6 hours  apixaban 5 milliGRAM(s) Oral every 12 hours  azithromycin  IVPB 500 milliGRAM(s) IV Intermittent every 24 hours  buDESOnide 160 MICROgram(s)/formoterol 4.5 MICROgram(s) Inhaler 2 Puff(s) Inhalation two times a day  cefepime   IVPB 2000 milliGRAM(s) IV Intermittent every 8 hours  dextrose 5%. 1000 milliLiter(s) (50 mL/Hr) IV Continuous <Continuous>  dextrose 50% Injectable 12.5 Gram(s) IV Push once  dextrose 50% Injectable 25 Gram(s) IV Push once  digoxin     Tablet 0.25 milliGRAM(s) Oral daily  docusate sodium 100 milliGRAM(s) Oral three times a day  guaiFENesin  milliGRAM(s) Oral every 12 hours  influenza   Vaccine 0.5 milliLiter(s) IntraMuscular once  insulin glargine Injectable (LANTUS) 12 Unit(s) SubCutaneous at bedtime  insulin lispro (HumaLOG) corrective regimen sliding scale   SubCutaneous three times a day before meals  insulin lispro (HumaLOG) corrective regimen sliding scale   SubCutaneous at bedtime  insulin lispro Injectable (HumaLOG) 3 Unit(s) SubCutaneous three times a day before meals  methylPREDNISolone sodium succinate Injectable 20 milliGRAM(s) IV Push every 8 hours  montelukast 10 milliGRAM(s) Oral daily  pantoprazole    Tablet 40 milliGRAM(s) Oral before breakfast  senna 2 Tablet(s) Oral at bedtime    MEDICATIONS  (PRN):  dextrose 40% Gel 15 Gram(s) Oral once PRN Blood Glucose LESS THAN 70 milliGRAM(s)/deciliter  glucagon  Injectable 1 milliGRAM(s) IntraMuscular once PRN Glucose LESS THAN 70 milligrams/deciliter  metoclopramide 5 milliGRAM(s) Oral three times a day PRN Nausea  traMADol 25 milliGRAM(s) Oral every 4 hours PRN Severe Pain (7 - 10)      LABS:                        10.9   3.4   )-----------( 213      ( 12 Jan 2019 08:18 )             34.9     01-12    140  |  104  |  12  ----------------------------<  231<H>  4.4   |  25  |  0.56    Ca    8.9      12 Jan 2019 08:18  Phos  3.4     01-12  Mg     2.2     01-12      PT/INR - ( 12 Jan 2019 08:18 )   PT: 13.9 sec;   INR: 1.20 ratio                   MICROBIOLOGY:     RADIOLOGY:  [ ] Reviewed and interpreted by me    Point of Care Ultrasound Findings:    PFT:    EKG:

## 2019-01-14 NOTE — PHARMACOTHERAPY INTERVENTION NOTE - COMMENTS
67 yo F with DM A1C 8.3 currently on methylprednisolone 20mg IV Q8H, lantus 12 units subcutaneously at bedtime, humalog 5 units subcutaneously three times daily (recently increased from 3 units), and humalog low dose correctional scale. BG in past 24 hours were 217, 251, 261, 174. Recommendation made to change insulin regimen to lantus 10 units subcutaneously at bedtime, humalog 6 units subcutaneously three times daily, and humalog moderate dose correctional scale steroid-induced hyperglycemia protocol.    Gerry Nielsen, PharmD  [phone]

## 2019-01-14 NOTE — DISCHARGE NOTE ADULT - SECONDARY DIAGNOSIS.
Acute hypoxemic respiratory failure Tracheobronchomalacia Atrial fibrillation Diabetes mellitus type 2, controlled Colorectal cancer Viral illness

## 2019-01-14 NOTE — DISCHARGE NOTE ADULT - MEDICATION SUMMARY - MEDICATIONS TO STOP TAKING
I will STOP taking the medications listed below when I get home from the hospital:    diphenhydrAMINE 25 mg oral capsule  -- 1 cap(s) by mouth every 8 hours, As needed, Rash and/or Itching

## 2019-01-14 NOTE — PROGRESS NOTE ADULT - PROBLEM SELECTOR PLAN 6
GI prophylaxis:  PPI pre breakfast  aspiration precautions-all meals are to OOB as able  reglan to continue as well

## 2019-01-14 NOTE — DISCHARGE NOTE ADULT - MEDICATION SUMMARY - MEDICATIONS TO TAKE
I will START or STAY ON the medications listed below when I get home from the hospital:    methylPREDNISolone 4 mg oral tablet  -- 4 tab(s) by mouth 2 times a day x 3 days  2 tab(s) by mouth 2 times a day x 4 days  1 tab(s) by mouth 2 times a day x 4 days  1 tab(s) by mouth  daily  -- Indication: For Asthma exacerbation    traMADol 50 mg oral tablet  -- 0.5 tab(s) by mouth every 4 hours, As needed, Severe Pain (7 - 10)  -- Indication: For Pain    acetaminophen 325 mg oral tablet  -- 2 tab(s) by mouth every 6 hours, As needed, Moderate Pain (4 - 6)  -- Indication: For Pain    digoxin 250 mcg (0.25 mg) oral tablet  -- 1 tab(s) by mouth once a day  -- Indication: For Afib    apixaban 5 mg oral tablet  -- 1 tab(s) by mouth every 12 hours  -- Indication: For Afib    insulin glargine  -- 10 unit(s) subcutaneous once a day (at bedtime)  -- Indication: For Diabetes    insulin lispro  -- 5 unit(s) subcutaneous 3 times a day (with meals)  -- Indication: For Diabetes    metoclopramide 5 mg oral tablet  -- 1 tab(s) by mouth 3 times a day, As needed, Nausea  -- Indication: For Nausea    budesonide-formoterol 160 mcg-4.5 mcg/inh inhalation aerosol  -- 2 puff(s) inhaled 2 times a day  -- Indication: For Asthma/bronchitis    ipratropium-albuterol 0.5 mg-2.5 mg/3 mLinhalation solution  -- 3 milliliter(s) inhaled every 6 hours  -- Indication: For Asthma exacerbation    tiotropium 18 mcg inhalation capsule  -- 1 cap(s) inhaled once a day  -- Indication: For Asthma    guaiFENesin 600 mg oral tablet, extended release  -- 1 tab(s) by mouth every 12 hours  -- Indication: For Cough    polyethylene glycol 3350 oral powder for reconstitution  -- 17 gram(s) by mouth every 12 hours  -- Indication: For Constipation    senna oral tablet  -- 2 tab(s) by mouth once a day (at bedtime)  -- Indication: For Constipation    docusate sodium 100 mg oral capsule  -- 1 cap(s) by mouth 3 times a day  -- Indication: For Constipation    Singulair 10 mg oral tablet  -- 1 tab(s) by mouth once a day  -- Indication: For Asthma    simethicone 80 mg oral tablet, chewable  -- 2 tab(s) by mouth 4 times a day, As needed, Gas  -- Indication: For Gas    fluticasone 50 mcg/inh nasal spray  -- 1 spray(s) into nose 2 times a day  -- Indication: For Nasal congestion    Protonix 40 mg oral delayed release tablet  -- 1 tab(s) by mouth once a day  -- Indication: For GERD (gastroesophageal reflux disease)

## 2019-01-14 NOTE — DISCHARGE NOTE ADULT - CARE PROVIDER_API CALL
Eulalio Allison (MD), Internal Medicine; Pulmonary Disease  [address] [address]  Phone: [phone]  Fax: [phone]

## 2019-01-14 NOTE — DISCHARGE NOTE ADULT - NS AS DC FOLLOWUP STROKE INST
Stroke (includes: TIA/SAH/ICH/Ischemic Stroke)/Smoking Cessation/Influenza vaccination (VIS Pub Date: August 7, 2015) Smoking Cessation/Influenza vaccination (VIS Pub Date: August 7, 2015)

## 2019-01-14 NOTE — PROGRESS NOTE ADULT - ASSESSMENT
69 yo F here with COPD exacerbation 2/2 Coronavirus with acute hypoxemic respiratory failure and bacterial pneumonia

## 2019-01-14 NOTE — DISCHARGE NOTE ADULT - CARE PLAN
Principal Discharge DX:	Asthma exacerbation  Goal:	asthma controlled  Assessment and plan of treatment:	continue current treatement  followup with pulmonologist  Secondary Diagnosis:	Acute hypoxemic respiratory failure  Assessment and plan of treatment:	2/2 copd 2/2 coronovirus and bacterial pneumonia  off of oxygen  chest vest 5x a day chest pt  acapella.  Secondary Diagnosis:	Tracheobronchomalacia  Assessment and plan of treatment:	Pt has underlying lung disease, with chronic cough, sputum production. Uses chest vest at home, will continue here.  Uses acapalla as well.  Continuing inhalers  Secondary Diagnosis:	Atrial fibrillation  Secondary Diagnosis:	Diabetes mellitus type 2, controlled  Assessment and plan of treatment:	A1C is 8.3 likely due to being on chronic steroids- steroid induced hyperglycemia  -  10u humalog, and c/w to lantus 14uqhs  - monitor FS closely now that steroids will be decreased  - will likely need some oral agent on d/c, metformin or januvia.  Secondary Diagnosis:	Colorectal cancer  Assessment and plan of treatment:	Continue home medications, reglan as needed, stool softener.  Secondary Diagnosis:	Viral illness Principal Discharge DX:	Asthma exacerbation  Goal:	asthma controlled  Assessment and plan of treatment:	continue current treatement  followup with pulmonologist  Secondary Diagnosis:	Acute hypoxemic respiratory failure  Assessment and plan of treatment:	2/2 copd 2/2 coronovirus and bacterial pneumonia  off of oxygen  chest vest 5x a day chest pt  acapella.  Secondary Diagnosis:	Tracheobronchomalacia  Assessment and plan of treatment:	Pt has underlying lung disease, with chronic cough, sputum production. Uses chest vest at home, will continue here.  Uses acapalla as well.  Continuing inhalers  Secondary Diagnosis:	Atrial fibrillation  Assessment and plan of treatment:	continue  Secondary Diagnosis:	Diabetes mellitus type 2, controlled  Assessment and plan of treatment:	A1C is 8.3 likely due to being on chronic steroids- steroid induced hyperglycemia  - continue current insulin dosing and reduce as per blood sugar   monitor Finger Sticks  closely now that steroids will be decreased  Follow up with endocrinology  Secondary Diagnosis:	Colorectal cancer  Assessment and plan of treatment:	Continue home medications, reglan as needed, stool softener.  Secondary Diagnosis:	Viral illness  Assessment and plan of treatment:	resolved Principal Discharge DX:	Asthma exacerbation  Goal:	asthma controlled  Assessment and plan of treatment:	continue current treatement  followup with pulmonologist  Secondary Diagnosis:	Acute hypoxemic respiratory failure  Assessment and plan of treatment:	2/2 copd 2/2 coronovirus and bacterial pneumonia  off of oxygen  chest vest 5x a day chest pt  acapella.  Secondary Diagnosis:	Tracheobronchomalacia  Assessment and plan of treatment:	Pt has underlying lung disease, with chronic cough, sputum production. Uses chest vest at home, will continue here.  Uses acapalla as well.  Continuing inhalers  Secondary Diagnosis:	Atrial fibrillation  Assessment and plan of treatment:	continue eliquis and digoxin  Secondary Diagnosis:	Diabetes mellitus type 2, controlled  Assessment and plan of treatment:	A1C is 8.3 likely due to being on chronic steroids- steroid induced hyperglycemia  - continue current insulin dosing and reduce as per blood sugar   monitor Finger Sticks  closely now that steroids will be decreased  Follow up with endocrinology  Secondary Diagnosis:	Colorectal cancer  Assessment and plan of treatment:	Continue home medications, reglan as needed, stool softener.  Secondary Diagnosis:	Viral illness  Assessment and plan of treatment:	resolved

## 2019-01-14 NOTE — DISCHARGE NOTE ADULT - HOSPITAL COURSE
67 yo F here with COPD exacerbation 2/2 Coronavirus with acute hypoxemic respiratory failure;  and bacterial pneumonial; CT  chest noncon showing tree in bud opacities likely infectious; - c/w cefipime/azithromycin; completeted 7 days; Initially on IV steroids, currently on slow taper. - steroid taper; passed speech/swallow eval; Hypoxia resolved; OFF O2.  Known Tracheobronchomalacia.   Pulm consulted, appreciate recs. continue Chest Vest and Acapella. Discharged home on steroid taper.

## 2019-01-14 NOTE — DISCHARGE NOTE ADULT - PLAN OF CARE
asthma controlled continue current treatement  followup with pulmonologist 2/2 copd 2/2 coronovirus and bacterial pneumonia  off of oxygen  chest vest 5x a day chest pt  acapella. Pt has underlying lung disease, with chronic cough, sputum production. Uses chest vest at home, will continue here.  Uses acapalla as well.  Continuing inhalers A1C is 8.3 likely due to being on chronic steroids- steroid induced hyperglycemia  -  10u humalog, and c/w to lantus 14uqhs  - monitor FS closely now that steroids will be decreased  - will likely need some oral agent on d/c, metformin or januvia. Continue home medications, reglan as needed, stool softener. continue A1C is 8.3 likely due to being on chronic steroids- steroid induced hyperglycemia  - continue current insulin dosing and reduce as per blood sugar   monitor Finger Sticks  closely now that steroids will be decreased  Follow up with endocrinology resolved continue eliquis and digoxin

## 2019-01-15 LAB
ANION GAP SERPL CALC-SCNC: 11 MMOL/L — SIGNIFICANT CHANGE UP (ref 5–17)
BUN SERPL-MCNC: 13 MG/DL — SIGNIFICANT CHANGE UP (ref 7–23)
CALCIUM SERPL-MCNC: 8.8 MG/DL — SIGNIFICANT CHANGE UP (ref 8.4–10.5)
CHLORIDE SERPL-SCNC: 101 MMOL/L — SIGNIFICANT CHANGE UP (ref 96–108)
CO2 SERPL-SCNC: 28 MMOL/L — SIGNIFICANT CHANGE UP (ref 22–31)
CREAT SERPL-MCNC: 0.57 MG/DL — SIGNIFICANT CHANGE UP (ref 0.5–1.3)
GLUCOSE SERPL-MCNC: 301 MG/DL — HIGH (ref 70–99)
HCT VFR BLD CALC: 35.4 % — SIGNIFICANT CHANGE UP (ref 34.5–45)
HGB BLD-MCNC: 10.9 G/DL — LOW (ref 11.5–15.5)
MCHC RBC-ENTMCNC: 21.8 PG — LOW (ref 27–34)
MCHC RBC-ENTMCNC: 30.8 GM/DL — LOW (ref 32–36)
MCV RBC AUTO: 70.8 FL — LOW (ref 80–100)
PLATELET # BLD AUTO: 298 K/UL — SIGNIFICANT CHANGE UP (ref 150–400)
POTASSIUM SERPL-MCNC: 4.3 MMOL/L — SIGNIFICANT CHANGE UP (ref 3.5–5.3)
POTASSIUM SERPL-SCNC: 4.3 MMOL/L — SIGNIFICANT CHANGE UP (ref 3.5–5.3)
RBC # BLD: 5 M/UL — SIGNIFICANT CHANGE UP (ref 3.8–5.2)
RBC # FLD: 19.5 % — HIGH (ref 10.3–14.5)
SODIUM SERPL-SCNC: 140 MMOL/L — SIGNIFICANT CHANGE UP (ref 135–145)
WBC # BLD: 8.97 K/UL — SIGNIFICANT CHANGE UP (ref 3.8–10.5)
WBC # FLD AUTO: 8.97 K/UL — SIGNIFICANT CHANGE UP (ref 3.8–10.5)

## 2019-01-15 PROCEDURE — 99232 SBSQ HOSP IP/OBS MODERATE 35: CPT

## 2019-01-15 PROCEDURE — 99233 SBSQ HOSP IP/OBS HIGH 50: CPT

## 2019-01-15 RX ORDER — INSULIN LISPRO 100/ML
8 VIAL (ML) SUBCUTANEOUS
Qty: 0 | Refills: 0 | Status: DISCONTINUED | OUTPATIENT
Start: 2019-01-15 | End: 2019-01-16

## 2019-01-15 RX ORDER — ALPRAZOLAM 0.25 MG
0.25 TABLET ORAL ONCE
Qty: 0 | Refills: 0 | Status: DISCONTINUED | OUTPATIENT
Start: 2019-01-15 | End: 2019-01-15

## 2019-01-15 RX ORDER — ALPRAZOLAM 0.25 MG
0.25 TABLET ORAL AT BEDTIME
Qty: 0 | Refills: 0 | Status: DISCONTINUED | OUTPATIENT
Start: 2019-01-15 | End: 2019-01-19

## 2019-01-15 RX ORDER — INSULIN GLARGINE 100 [IU]/ML
12 INJECTION, SOLUTION SUBCUTANEOUS AT BEDTIME
Qty: 0 | Refills: 0 | Status: DISCONTINUED | OUTPATIENT
Start: 2019-01-15 | End: 2019-01-16

## 2019-01-15 RX ADMIN — CEFEPIME 100 MILLIGRAM(S): 1 INJECTION, POWDER, FOR SOLUTION INTRAMUSCULAR; INTRAVENOUS at 21:40

## 2019-01-15 RX ADMIN — Medication 4: at 08:42

## 2019-01-15 RX ADMIN — Medication 20 MILLIGRAM(S): at 14:26

## 2019-01-15 RX ADMIN — Medication 2: at 17:28

## 2019-01-15 RX ADMIN — Medication 8 UNIT(S): at 08:43

## 2019-01-15 RX ADMIN — Medication 3 MILLILITER(S): at 12:25

## 2019-01-15 RX ADMIN — Medication 20 MILLIGRAM(S): at 21:40

## 2019-01-15 RX ADMIN — PANTOPRAZOLE SODIUM 40 MILLIGRAM(S): 20 TABLET, DELAYED RELEASE ORAL at 05:51

## 2019-01-15 RX ADMIN — AZITHROMYCIN 250 MILLIGRAM(S): 500 TABLET, FILM COATED ORAL at 02:42

## 2019-01-15 RX ADMIN — Medication 0.25 MILLIGRAM(S): at 05:51

## 2019-01-15 RX ADMIN — Medication 6: at 12:25

## 2019-01-15 RX ADMIN — MONTELUKAST 10 MILLIGRAM(S): 4 TABLET, CHEWABLE ORAL at 12:25

## 2019-01-15 RX ADMIN — Medication 0.25 MILLIGRAM(S): at 01:07

## 2019-01-15 RX ADMIN — INSULIN GLARGINE 12 UNIT(S): 100 INJECTION, SOLUTION SUBCUTANEOUS at 21:39

## 2019-01-15 RX ADMIN — BUDESONIDE AND FORMOTEROL FUMARATE DIHYDRATE 2 PUFF(S): 160; 4.5 AEROSOL RESPIRATORY (INHALATION) at 17:33

## 2019-01-15 RX ADMIN — Medication 3 MILLILITER(S): at 05:51

## 2019-01-15 RX ADMIN — CHLORHEXIDINE GLUCONATE 1 APPLICATION(S): 213 SOLUTION TOPICAL at 14:24

## 2019-01-15 RX ADMIN — Medication 100 MILLIGRAM(S): at 05:51

## 2019-01-15 RX ADMIN — CEFEPIME 100 MILLIGRAM(S): 1 INJECTION, POWDER, FOR SOLUTION INTRAMUSCULAR; INTRAVENOUS at 14:25

## 2019-01-15 RX ADMIN — Medication 8 UNIT(S): at 17:28

## 2019-01-15 RX ADMIN — Medication 2: at 21:40

## 2019-01-15 RX ADMIN — SENNA PLUS 2 TABLET(S): 8.6 TABLET ORAL at 21:40

## 2019-01-15 RX ADMIN — Medication 3 MILLILITER(S): at 18:54

## 2019-01-15 RX ADMIN — APIXABAN 5 MILLIGRAM(S): 2.5 TABLET, FILM COATED ORAL at 05:51

## 2019-01-15 RX ADMIN — Medication 0.25 MILLIGRAM(S): at 23:19

## 2019-01-15 RX ADMIN — CEFEPIME 100 MILLIGRAM(S): 1 INJECTION, POWDER, FOR SOLUTION INTRAMUSCULAR; INTRAVENOUS at 05:50

## 2019-01-15 RX ADMIN — Medication 8 UNIT(S): at 12:25

## 2019-01-15 RX ADMIN — Medication 600 MILLIGRAM(S): at 17:28

## 2019-01-15 RX ADMIN — Medication 20 MILLIGRAM(S): at 05:51

## 2019-01-15 RX ADMIN — BUDESONIDE AND FORMOTEROL FUMARATE DIHYDRATE 2 PUFF(S): 160; 4.5 AEROSOL RESPIRATORY (INHALATION) at 05:51

## 2019-01-15 RX ADMIN — Medication 100 MILLIGRAM(S): at 21:40

## 2019-01-15 RX ADMIN — APIXABAN 5 MILLIGRAM(S): 2.5 TABLET, FILM COATED ORAL at 17:29

## 2019-01-15 RX ADMIN — Medication 600 MILLIGRAM(S): at 05:51

## 2019-01-15 RX ADMIN — Medication 100 MILLIGRAM(S): at 14:25

## 2019-01-15 NOTE — DIETITIAN INITIAL EVALUATION ADULT. - PHYSICAL APPEARANCE
well nourished/BMI 23. Nutrition focused physical exam conducted with patient consent, no muscle or fat wasting found.

## 2019-01-15 NOTE — PHYSICAL THERAPY INITIAL EVALUATION ADULT - GENERAL OBSERVATIONS, REHAB EVAL
BS reviewed. pt received sitting in chair, +R chest port, follows all commands willing to ambulate after lunch.

## 2019-01-15 NOTE — PHYSICAL THERAPY INITIAL EVALUATION ADULT - PERTINENT HX OF CURRENT PROBLEM, REHAB EVAL
Pt is a 67 yo F admitted to [facility] on 1/11/19 for worsening fever and cough. +SOB and productive cough. She has chronic cough with productive sputum, but this is worse and increased.  She normally is not able to ambulate and speak at the same time due to her baseline shortness of breath.  She does not use oxygen at home.  She saw Dr. Allison in the office last week when she was given the medrol pack. Pt denies any chest pain, dizziness, headaches. No palpitations. No abdominal pain, N/V/D.

## 2019-01-15 NOTE — PHYSICAL THERAPY INITIAL EVALUATION ADULT - DISCHARGE DISPOSITION, PT EVAL
outpatient PT/DC home with outpt pulm PT services, owns equipment at home, had HHA services, sister to assist as needed, JÚNIOR Mccurdy aware.

## 2019-01-15 NOTE — DIETITIAN INITIAL EVALUATION ADULT. - ADHERENCE
Pt is careful about counting carbohydrates and eating general healthful diet. Typical breakfast was coffee, eggs and a sweet potato, a Glucerna later on, and then a dinner of vegetables, mushrooms and turkey.  Pt will have the occassional sweet, is taking insulin at home and checks her blood sugar levels before she eats. Suspect good BG control through diet and insulin use.  Pt is aware her A1c is elevated at 8.3%, but reports she has been on steroids for 3 months and that is the reason for the elevation./good Pt is careful about counting carbohydrates and eating general healthful diet. Typical breakfast was coffee, eggs and a sweet potato, a Glucerna later on, and then a dinner of vegetables, mushrooms and turkey.  Pt will have the occassional sweet, is taking insulin at home and checks her blood sugar levels before she eats (ranges 100-200, rarely dips to 70, however reviewed protocol for hypoglycemic event). Suspect good BG control through diet and insulin use.  Pt is aware her A1c is elevated at 8.3%, but reports she has been on steroids for 3 months and that is the reason for the elevation./good

## 2019-01-15 NOTE — PROGRESS NOTE ADULT - PROBLEM SELECTOR PLAN 6
A1C is 8.3 likely due to being on chronic steroids- steroid induced hyperglcemua  - will increase to 8u humalog, and increase to lantus 12uqhs  - will likely need some oral agent on d/c, metformin or januvia

## 2019-01-15 NOTE — DIETITIAN INITIAL EVALUATION ADULT. - ORAL INTAKE PTA
good/Pt reported good appetite PTA, eating 2-3 meals/day plus a Glucerna. Pt reported allergy to iodine and shellfish. Pt had not been taking any vitamins, but supplements with Glucerna as well as Whey Protein to help her regain her lost weight.  Pt is trying to get her wt up to 145 lb. Pt reported good appetite PTA, her brother in law cooks for her, eating 2-3 meals/day plus a Glucerna. Pt reported allergy to iodine and shellfish. Pt had not been taking any vitamins, but supplements with Glucerna as well as Whey Protein to help her regain her lost weight.  Pt is trying to get her wt up to 145 lb./good

## 2019-01-15 NOTE — PHYSICAL THERAPY INITIAL EVALUATION ADULT - PRECAUTIONS/LIMITATIONS, REHAB EVAL
Pt with pmhx of Colon Cx s/p colectomy, CKD, asthma, afib, COPD, Diabetes, tracheomalacia. Hosp course: per pulm,1/14-still bronchospastic-still on O2 and sob, 1/15- off O2-D5 abx-better over all fall precautions/Pt with pmhx of Colon Cx s/p colectomy, CKD, asthma, afib, COPD, Diabetes, tracheomalacia. Hosp course: per pulm,1/14-still bronchospastic-still on O2 and sob, 1/15- off O2-D5 abx-better over all

## 2019-01-15 NOTE — PHARMACOTHERAPY INTERVENTION NOTE - COMMENTS
67 yo F with DM A1C 8.3 currently on methylprednisolone 20mg IV Q8H, lantus 10 units subcutaneously at bedtime, humalog 6 units subcutaneously three times daily, and humalog mod dose correctional scale. BG in past 24 hours were 227, 309, 199, 342. Recommendation made to increase lantus to 12 units subcutaneously at bedtime and humalog to 8 units subcutaneously three times daily, per steroid-induced hyperglycemia protocol.    Gerry Nielsen, PharmD  [phone]

## 2019-01-15 NOTE — DIETITIAN INITIAL EVALUATION ADULT. - ENERGY NEEDS
Ht.  66 ", Wt.   64.4 Kg, BMI 23 kg/m2,  IBW  59.1 Kg,    Pt is at  108 %IBW.   No edema noted, no pressure injuries noted per nursing flowsheet  Differ fluids to medical team.   Pt with PMH of Colon Cancer, colectomy, CKD, afib, COPD, T2DM, tracheomalacia, DVT, TIA .  Pt admitted with SOB, cough and fever, found with COPD exacerbation 2/2 coronavirus and bacterial PNA. Ht.  66 ", Wt.   64.4 Kg, BMI 23 kg/m2,  IBW  59.1 Kg,    Pt is at  108 %IBW.   No edema noted, no pressure injuries noted per nursing flowsheet  Pt with PMH of Colon Cancer, colostomy, CKD, afib, COPD, T2DM, tracheomalacia, DVT, TIA .  Pt admitted with SOB, cough and fever, found with COPD exacerbation 2/2 coronavirus and bacterial PNA. Ht.  66 ", Wt.   64.4 Kg, BMI 23 kg/m2,  IBW  59.1 Kg,    Pt is at  108 %IBW.   No edema noted, no pressure injuries noted per nursing flowsheet  Pt with PMH of Colon Cancer, colectomy progressed to colostomy , CKD, afib, COPD, T2DM, tracheomalacia, DVT, TIA .  Pt admitted with SOB, cough and fever, found with COPD exacerbation 2/2 coronavirus and bacterial PNA.

## 2019-01-15 NOTE — DIETITIAN INITIAL EVALUATION ADULT. - FACTORS AFF FOOD INTAKE
difficulty chewing/Pt with ill fitting dentures.  She declines a modified textured diet and prefers to choose things that she can eat from a  regular diet.  pt requested Glucerna TID to supplement intake.

## 2019-01-15 NOTE — DIETITIAN INITIAL EVALUATION ADULT. - NS AS NUTRI INTERV ED CONTENT
COPD nutrition intervention discussed, eating protein first, small frequent meals, use of supplements/Other or related topics

## 2019-01-15 NOTE — DIETITIAN INITIAL EVALUATION ADULT. - PERTINENT LABORATORY DATA
POCT (1/14) 199-342, (1/13) 174-251  Glucose (1/15) 301,  A1c 8.3% Finger Sticks (1/15) 227, (1/14) 199-342, (1/13) 174-251  Glucose (1/15) 301,  (1/12) A1c 8.3%

## 2019-01-15 NOTE — PROGRESS NOTE ADULT - ASSESSMENT
69 y/o F w/asthma on chronic prednisone, tracheobronchomalacia s/p tracheo-bronchoplasty presenting with asthma exacerbation and hypoxemia secondary to coronavirus infection.     1/14-still bronchospastic-still on O2 and sob  1/15- off O2-D5 abx-better over all

## 2019-01-15 NOTE — DIETITIAN INITIAL EVALUATION ADULT. - OTHER INFO
Pt seen on 4D as a consult for A1c of 8.3%. On pt's last admission on 8/2018 she weighed 63 kg, and reported being stable at that weight for a few months.  Pt reported that was her lowest wt and is trying to regain some of her lost wt.  Pt currently weighs 64.6 kg (1/14) and states she would like to continue to gain wt until she reaches  65.9 kg. Pt's appetite in hospital is "OK", eating 2 meals/day.  At home she eats well, her food is prepared by her brother in law- her baseline is 2-3 meals/day plus a Glucerna shake.  Pt has complained of the food in the hospital being stale and has requested Glucerna TID to supplement. Pt with COPD, however reported no difficulty eating while breathing or loss of appetite. No c/o nausea, constipation, diarrhea, vomiting, difficulty swallowing. Last BM reported 1/14. Pt was advised to report symptoms like these to her doctor if they ever do arise.  Pt was advised to eat protein first and to sip supplement if her appetite wanes. Pt seen on 4D as a consult for A1c of 8.3%. On pt's last admission on 8/2018 she weighed 63 kg, and reported being stable at that weight for a few months.  Pt reported that was her lowest wt and is trying to regain some of her lost wt.  Pt currently weighs 64.6 kg (1/14) and states she would like to continue to gain wt until she reaches  65.9 kg. Pt's appetite in hospital is "OK", eating 2 meals/day.  Pt has complained of the food in the hospital being stale and has requested Glucerna TID to supplement. Pt with COPD, however reported no difficulty eating while breathing or loss of appetite. Pt was advised to report symptoms like these to her doctor if they ever do arise.  Pt was advised to eat protein first and to sip supplement if her appetite wanes. No c/o nausea, constipation, diarrhea, vomiting, difficulty swallowing. Last BM reported 1/14.

## 2019-01-15 NOTE — PROGRESS NOTE ADULT - SUBJECTIVE AND OBJECTIVE BOX
Patient is a 68y old  Female who presents with a chief complaint of Cough, Fevers, Shortness of breath (15 Facundo 2019 05:51)      SUBJECTIVE / OVERNIGHT EVENTS:    patient seen and examined.   feels a lot better. not coughing up so much muck/  no fevers no chills    ROS:  14 point ROS negative in detail except stated as above    MEDICATIONS  (STANDING):  ALBUTerol/ipratropium for Nebulization 3 milliLiter(s) Nebulizer every 6 hours  apixaban 5 milliGRAM(s) Oral every 12 hours  azithromycin  IVPB 500 milliGRAM(s) IV Intermittent every 24 hours  buDESOnide 160 MICROgram(s)/formoterol 4.5 MICROgram(s) Inhaler 2 Puff(s) Inhalation two times a day  cefepime   IVPB 2000 milliGRAM(s) IV Intermittent every 8 hours  chlorhexidine 4% Liquid 1 Application(s) Topical <User Schedule>  dextrose 5%. 1000 milliLiter(s) (50 mL/Hr) IV Continuous <Continuous>  dextrose 50% Injectable 12.5 Gram(s) IV Push once  dextrose 50% Injectable 25 Gram(s) IV Push once  digoxin     Tablet 0.25 milliGRAM(s) Oral daily  docusate sodium 100 milliGRAM(s) Oral three times a day  guaiFENesin  milliGRAM(s) Oral every 12 hours  influenza   Vaccine 0.5 milliLiter(s) IntraMuscular once  insulin glargine Injectable (LANTUS) 12 Unit(s) SubCutaneous at bedtime  insulin lispro (HumaLOG) corrective regimen sliding scale.   SubCutaneous three times a day before meals  insulin lispro (HumaLOG) corrective regimen sliding scale.   SubCutaneous at bedtime  insulin lispro Injectable (HumaLOG) 8 Unit(s) SubCutaneous three times a day before meals  methylPREDNISolone sodium succinate Injectable 20 milliGRAM(s) IV Push every 8 hours  montelukast 10 milliGRAM(s) Oral daily  pantoprazole    Tablet 40 milliGRAM(s) Oral before breakfast  senna 2 Tablet(s) Oral at bedtime    MEDICATIONS  (PRN):  dextrose 40% Gel 15 Gram(s) Oral once PRN Blood Glucose LESS THAN 70 milliGRAM(s)/deciliter  glucagon  Injectable 1 milliGRAM(s) IntraMuscular once PRN Glucose LESS THAN 70 milligrams/deciliter  metoclopramide 5 milliGRAM(s) Oral three times a day PRN Nausea  traMADol 25 milliGRAM(s) Oral every 4 hours PRN Severe Pain (7 - 10)      CAPILLARY BLOOD GLUCOSE      POCT Blood Glucose.: 227 mg/dL (15 Facundo 2019 08:02)  POCT Blood Glucose.: 309 mg/dL (14 Jan 2019 22:08)  POCT Blood Glucose.: 199 mg/dL (14 Jan 2019 16:59)  POCT Blood Glucose.: 342 mg/dL (14 Jan 2019 11:43)    I&O's Summary    14 Jan 2019 07:01  -  15 Facundo 2019 07:00  --------------------------------------------------------  IN: 860 mL / OUT: 1300 mL / NET: -440 mL        PHYSICAL EXAM:  Vital Signs Last 24 Hrs  T(C): 36.7 (15 Facundo 2019 04:39), Max: 37 (14 Jan 2019 21:32)  T(F): 98 (15 Facundo 2019 04:39), Max: 98.6 (14 Jan 2019 21:32)  HR: 60 (15 Facundo 2019 04:39) (60 - 83)  BP: 155/64 (15 Facundo 2019 04:39) (101/57 - 155/64)  BP(mean): --  RR: 18 (15 Facundo 2019 04:39) (18 - 18)  SpO2: 98% (15 Facundo 2019 04:39) (97% - 98%)    GENERAL: NAD on nc  EYES: EOMI, PERRL  NECK: Supple, No JVD  CHEST/LUNG: rhonchi b/l  HEART: s1 s2 irregularly irregular No murmurs  ABDOMEN: Soft, Nontender, Nondistended; Bowel sounds present  EXTREMITIES:  2+ Peripheral Pulses, No clubbing, cyanosis, or edema  NEUROLOGY: AAOx3; non-focal  SKIN: No rashes or lesions    LABS:                        10.9   8.97  )-----------( 298      ( 15 Facundo 2019 08:10 )             35.4     01-15    140  |  101  |  13  ----------------------------<  301<H>  4.3   |  28  |  0.57    Ca    8.8      15 Facundo 2019 06:08                  Consultant(s) Notes Reviewed:    pulm    Care Discussed with Consultants/Other Providers:  ALLEY Holland

## 2019-01-15 NOTE — PHYSICAL THERAPY INITIAL EVALUATION ADULT - ADDITIONAL COMMENTS
pt lives at home with sister, +15 steps to negotiate, PTA amb with no device, owns walker/cane, +HHA services, asssit from sister as needed, +access a ride for MD appts/oupt PT

## 2019-01-15 NOTE — PROGRESS NOTE ADULT - SUBJECTIVE AND OBJECTIVE BOX
CHIEF COMPLAINT: f/up for sob, TBM, corona virus induced severe asthmatic bronchitis, allergic rhinitis--better over all--less sob and cough and mucus    Interval Events: vest rx    REVIEW OF SYSTEMS:  Constitutional: No fevers or chills. No weight loss. + fatigue or generalized malaise.  Eyes: No itching or discharge from the eyes  ENT: No ear pain. No ear discharge. No nasal congestion. No post nasal drip. No epistaxis. No throat pain. No sore throat. No difficulty swallowing.   CV: No chest pain. No palpitations. No lightheadedness or dizziness.   Resp: No dyspnea at rest. + dyspnea on exertion. No orthopnea. + wheezing. + cough. No stridor. + sputum production. No chest pain with respiration.  GI: No nausea. No vomiting. No diarrhea.  MSK: No joint pain or pain in any extremities  Integumentary: No skin lesions. No pedal edema.  Neurological: No gross motor weakness. No sensory changes.  [+ ] All other systems negative  [ ] Unable to assess ROS because ________    OBJECTIVE:  ICU Vital Signs Last 24 Hrs  T(C): 36.7 (15 Facundo 2019 04:39), Max: 37 (14 Jan 2019 21:32)  T(F): 98 (15 Facundo 2019 04:39), Max: 98.6 (14 Jan 2019 21:32)  HR: 60 (15 Facundo 2019 04:39) (60 - 83)  BP: 155/64 (15 Facundo 2019 04:39) (101/57 - 155/64)  BP(mean): --  ABP: --  ABP(mean): --  RR: 18 (15 Facundo 2019 04:39) (18 - 18)  SpO2: 98% (15 Facundo 2019 04:39) (97% - 98%)        01-13 @ 07:01  -  01-14 @ 07:00  --------------------------------------------------------  IN: 980 mL / OUT: 1400 mL / NET: -420 mL    01-14 @ 07:01 - 01-15 @ 05:52  --------------------------------------------------------  IN: 860 mL / OUT: 1300 mL / NET: -440 mL      CAPILLARY BLOOD GLUCOSE      POCT Blood Glucose.: 309 mg/dL (14 Jan 2019 22:08)      PHYSICAL EXAM: NAD in bed -on RA   General: Awake, alert, oriented X 3.   HEENT: Atraumatic, normocephalic.                 Mallampatti Grade 2                No nasal congestion.                No tonsillar or pharyngeal exudates.  Lymph Nodes: No palpable lymphadenopathy  Neck: No JVD. No carotid bruit.   Respiratory: Normal chest expansion                         Normal percussion                         Normal and equal air entry                         + exp wheeze,no rhonchi or rales.  Cardiovascular: S1 S2 normal. No murmurs, rubs or gallops.   Abdomen: Soft, non-tender, non-distended. No organomegaly. Normoactive bowel sounds. Ostomy in place  Extremities: Warm to touch. Peripheral pulse palpable. No pedal edema.   Skin: No rashes or skin lesions  Neurological: Motor and sensory examination equal and normal in all four extremities.  Psychiatry: Appropriate mood and affect.    HOSPITAL MEDICATIONS:  MEDICATIONS  (STANDING):  ALBUTerol/ipratropium for Nebulization 3 milliLiter(s) Nebulizer every 6 hours  apixaban 5 milliGRAM(s) Oral every 12 hours  azithromycin  IVPB 500 milliGRAM(s) IV Intermittent every 24 hours  buDESOnide 160 MICROgram(s)/formoterol 4.5 MICROgram(s) Inhaler 2 Puff(s) Inhalation two times a day  cefepime   IVPB 2000 milliGRAM(s) IV Intermittent every 8 hours  chlorhexidine 4% Liquid 1 Application(s) Topical <User Schedule>  dextrose 5%. 1000 milliLiter(s) (50 mL/Hr) IV Continuous <Continuous>  dextrose 50% Injectable 12.5 Gram(s) IV Push once  dextrose 50% Injectable 25 Gram(s) IV Push once  digoxin     Tablet 0.25 milliGRAM(s) Oral daily  docusate sodium 100 milliGRAM(s) Oral three times a day  guaiFENesin  milliGRAM(s) Oral every 12 hours  influenza   Vaccine 0.5 milliLiter(s) IntraMuscular once  insulin glargine Injectable (LANTUS) 10 Unit(s) SubCutaneous at bedtime  insulin lispro (HumaLOG) corrective regimen sliding scale.   SubCutaneous three times a day before meals  insulin lispro (HumaLOG) corrective regimen sliding scale.   SubCutaneous at bedtime  insulin lispro Injectable (HumaLOG) 6 Unit(s) SubCutaneous three times a day before meals  methylPREDNISolone sodium succinate Injectable 20 milliGRAM(s) IV Push every 8 hours  montelukast 10 milliGRAM(s) Oral daily  pantoprazole    Tablet 40 milliGRAM(s) Oral before breakfast  senna 2 Tablet(s) Oral at bedtime    MEDICATIONS  (PRN):  dextrose 40% Gel 15 Gram(s) Oral once PRN Blood Glucose LESS THAN 70 milliGRAM(s)/deciliter  glucagon  Injectable 1 milliGRAM(s) IntraMuscular once PRN Glucose LESS THAN 70 milligrams/deciliter  metoclopramide 5 milliGRAM(s) Oral three times a day PRN Nausea  traMADol 25 milliGRAM(s) Oral every 4 hours PRN Severe Pain (7 - 10)      LABS:                        10.7   7.49  )-----------( 257      ( 14 Jan 2019 11:01 )             34.5     01-14    139  |  99  |  16  ----------------------------<  266<H>  4.2   |  27  |  0.63    Ca    8.9      14 Jan 2019 07:56                MICROBIOLOGY:     RADIOLOGY:  [ ] Reviewed and interpreted by me    Point of Care Ultrasound Findings:    PFT:    EKG:

## 2019-01-16 ENCOUNTER — APPOINTMENT (OUTPATIENT)
Dept: PULMONOLOGY | Facility: CLINIC | Age: 69
End: 2019-01-16

## 2019-01-16 LAB
ANION GAP SERPL CALC-SCNC: 11 MMOL/L — SIGNIFICANT CHANGE UP (ref 5–17)
BUN SERPL-MCNC: 15 MG/DL — SIGNIFICANT CHANGE UP (ref 7–23)
CALCIUM SERPL-MCNC: 8.8 MG/DL — SIGNIFICANT CHANGE UP (ref 8.4–10.5)
CHLORIDE SERPL-SCNC: 98 MMOL/L — SIGNIFICANT CHANGE UP (ref 96–108)
CO2 SERPL-SCNC: 30 MMOL/L — SIGNIFICANT CHANGE UP (ref 22–31)
CREAT SERPL-MCNC: 0.57 MG/DL — SIGNIFICANT CHANGE UP (ref 0.5–1.3)
CULTURE RESULTS: SIGNIFICANT CHANGE UP
CULTURE RESULTS: SIGNIFICANT CHANGE UP
GLUCOSE SERPL-MCNC: 295 MG/DL — HIGH (ref 70–99)
HCT VFR BLD CALC: 36.2 % — SIGNIFICANT CHANGE UP (ref 34.5–45)
HGB BLD-MCNC: 11.3 G/DL — LOW (ref 11.5–15.5)
MCHC RBC-ENTMCNC: 22 PG — LOW (ref 27–34)
MCHC RBC-ENTMCNC: 31.2 GM/DL — LOW (ref 32–36)
MCV RBC AUTO: 70.4 FL — LOW (ref 80–100)
PLATELET # BLD AUTO: 312 K/UL — SIGNIFICANT CHANGE UP (ref 150–400)
POTASSIUM SERPL-MCNC: 4.2 MMOL/L — SIGNIFICANT CHANGE UP (ref 3.5–5.3)
POTASSIUM SERPL-SCNC: 4.2 MMOL/L — SIGNIFICANT CHANGE UP (ref 3.5–5.3)
RBC # BLD: 5.14 M/UL — SIGNIFICANT CHANGE UP (ref 3.8–5.2)
RBC # FLD: 19.3 % — HIGH (ref 10.3–14.5)
SODIUM SERPL-SCNC: 139 MMOL/L — SIGNIFICANT CHANGE UP (ref 135–145)
SPECIMEN SOURCE: SIGNIFICANT CHANGE UP
SPECIMEN SOURCE: SIGNIFICANT CHANGE UP
WBC # BLD: 9.38 K/UL — SIGNIFICANT CHANGE UP (ref 3.8–10.5)
WBC # FLD AUTO: 9.38 K/UL — SIGNIFICANT CHANGE UP (ref 3.8–10.5)

## 2019-01-16 PROCEDURE — 99233 SBSQ HOSP IP/OBS HIGH 50: CPT

## 2019-01-16 PROCEDURE — 99232 SBSQ HOSP IP/OBS MODERATE 35: CPT

## 2019-01-16 RX ORDER — FLUTICASONE PROPIONATE 50 MCG
1 SPRAY, SUSPENSION NASAL
Qty: 0 | Refills: 0 | Status: DISCONTINUED | OUTPATIENT
Start: 2019-01-16 | End: 2019-01-19

## 2019-01-16 RX ORDER — LACTULOSE 10 G/15ML
30 SOLUTION ORAL ONCE
Qty: 0 | Refills: 0 | Status: COMPLETED | OUTPATIENT
Start: 2019-01-16 | End: 2019-01-16

## 2019-01-16 RX ORDER — INSULIN LISPRO 100/ML
10 VIAL (ML) SUBCUTANEOUS
Qty: 0 | Refills: 0 | Status: DISCONTINUED | OUTPATIENT
Start: 2019-01-16 | End: 2019-01-19

## 2019-01-16 RX ORDER — INSULIN GLARGINE 100 [IU]/ML
14 INJECTION, SOLUTION SUBCUTANEOUS AT BEDTIME
Qty: 0 | Refills: 0 | Status: DISCONTINUED | OUTPATIENT
Start: 2019-01-16 | End: 2019-01-19

## 2019-01-16 RX ADMIN — Medication 20 MILLIGRAM(S): at 06:30

## 2019-01-16 RX ADMIN — MONTELUKAST 10 MILLIGRAM(S): 4 TABLET, CHEWABLE ORAL at 11:36

## 2019-01-16 RX ADMIN — Medication 600 MILLIGRAM(S): at 06:30

## 2019-01-16 RX ADMIN — BUDESONIDE AND FORMOTEROL FUMARATE DIHYDRATE 2 PUFF(S): 160; 4.5 AEROSOL RESPIRATORY (INHALATION) at 06:30

## 2019-01-16 RX ADMIN — AZITHROMYCIN 250 MILLIGRAM(S): 500 TABLET, FILM COATED ORAL at 02:48

## 2019-01-16 RX ADMIN — Medication 100 MILLIGRAM(S): at 21:23

## 2019-01-16 RX ADMIN — Medication 3 MILLILITER(S): at 06:29

## 2019-01-16 RX ADMIN — CHLORHEXIDINE GLUCONATE 1 APPLICATION(S): 213 SOLUTION TOPICAL at 11:37

## 2019-01-16 RX ADMIN — CEFEPIME 100 MILLIGRAM(S): 1 INJECTION, POWDER, FOR SOLUTION INTRAMUSCULAR; INTRAVENOUS at 06:30

## 2019-01-16 RX ADMIN — Medication 10 UNIT(S): at 17:32

## 2019-01-16 RX ADMIN — Medication 1 SPRAY(S): at 17:31

## 2019-01-16 RX ADMIN — Medication 10 UNIT(S): at 12:42

## 2019-01-16 RX ADMIN — Medication 6: at 08:20

## 2019-01-16 RX ADMIN — Medication 3 MILLILITER(S): at 17:32

## 2019-01-16 RX ADMIN — BUDESONIDE AND FORMOTEROL FUMARATE DIHYDRATE 2 PUFF(S): 160; 4.5 AEROSOL RESPIRATORY (INHALATION) at 17:31

## 2019-01-16 RX ADMIN — CEFEPIME 100 MILLIGRAM(S): 1 INJECTION, POWDER, FOR SOLUTION INTRAMUSCULAR; INTRAVENOUS at 14:50

## 2019-01-16 RX ADMIN — Medication 6: at 17:31

## 2019-01-16 RX ADMIN — Medication 3 MILLILITER(S): at 00:43

## 2019-01-16 RX ADMIN — SENNA PLUS 2 TABLET(S): 8.6 TABLET ORAL at 21:23

## 2019-01-16 RX ADMIN — INSULIN GLARGINE 14 UNIT(S): 100 INJECTION, SOLUTION SUBCUTANEOUS at 21:56

## 2019-01-16 RX ADMIN — LACTULOSE 30 GRAM(S): 10 SOLUTION ORAL at 15:36

## 2019-01-16 RX ADMIN — Medication 0.25 MILLIGRAM(S): at 06:30

## 2019-01-16 RX ADMIN — Medication 600 MILLIGRAM(S): at 17:31

## 2019-01-16 RX ADMIN — Medication 100 MILLIGRAM(S): at 14:43

## 2019-01-16 RX ADMIN — Medication 20 MILLIGRAM(S): at 14:43

## 2019-01-16 RX ADMIN — Medication 100 MILLIGRAM(S): at 06:30

## 2019-01-16 RX ADMIN — Medication 20 MILLIGRAM(S): at 21:24

## 2019-01-16 RX ADMIN — APIXABAN 5 MILLIGRAM(S): 2.5 TABLET, FILM COATED ORAL at 06:30

## 2019-01-16 RX ADMIN — Medication 3 MILLILITER(S): at 23:15

## 2019-01-16 RX ADMIN — Medication 3 MILLILITER(S): at 11:36

## 2019-01-16 RX ADMIN — Medication 8 UNIT(S): at 08:20

## 2019-01-16 RX ADMIN — PANTOPRAZOLE SODIUM 40 MILLIGRAM(S): 20 TABLET, DELAYED RELEASE ORAL at 06:30

## 2019-01-16 RX ADMIN — APIXABAN 5 MILLIGRAM(S): 2.5 TABLET, FILM COATED ORAL at 17:31

## 2019-01-16 RX ADMIN — Medication 4: at 12:42

## 2019-01-16 RX ADMIN — CEFEPIME 100 MILLIGRAM(S): 1 INJECTION, POWDER, FOR SOLUTION INTRAMUSCULAR; INTRAVENOUS at 21:23

## 2019-01-16 RX ADMIN — Medication 2: at 21:55

## 2019-01-16 NOTE — PROGRESS NOTE ADULT - SUBJECTIVE AND OBJECTIVE BOX
Patient is a 68y old  Female who presents with a chief complaint of Cough, Fevers, Shortness of breath (16 Jan 2019 05:25)      SUBJECTIVE / OVERNIGHT EVENTS:    patient seen and examined at bedside. feels ok today not as good as yesterday. feels like she needs to bring up sputum up and isnt able to. no fevers no chills    ROS:  14 point ROS negative in detail except stated as above    MEDICATIONS  (STANDING):  ALBUTerol/ipratropium for Nebulization 3 milliLiter(s) Nebulizer every 6 hours  apixaban 5 milliGRAM(s) Oral every 12 hours  azithromycin  IVPB 500 milliGRAM(s) IV Intermittent every 24 hours  buDESOnide 160 MICROgram(s)/formoterol 4.5 MICROgram(s) Inhaler 2 Puff(s) Inhalation two times a day  cefepime   IVPB 2000 milliGRAM(s) IV Intermittent every 8 hours  chlorhexidine 4% Liquid 1 Application(s) Topical <User Schedule>  dextrose 5%. 1000 milliLiter(s) (50 mL/Hr) IV Continuous <Continuous>  dextrose 50% Injectable 12.5 Gram(s) IV Push once  dextrose 50% Injectable 25 Gram(s) IV Push once  digoxin     Tablet 0.25 milliGRAM(s) Oral daily  docusate sodium 100 milliGRAM(s) Oral three times a day  guaiFENesin  milliGRAM(s) Oral every 12 hours  influenza   Vaccine 0.5 milliLiter(s) IntraMuscular once  insulin glargine Injectable (LANTUS) 14 Unit(s) SubCutaneous at bedtime  insulin lispro (HumaLOG) corrective regimen sliding scale.   SubCutaneous three times a day before meals  insulin lispro (HumaLOG) corrective regimen sliding scale.   SubCutaneous at bedtime  insulin lispro Injectable (HumaLOG) 10 Unit(s) SubCutaneous three times a day before meals  methylPREDNISolone sodium succinate Injectable 20 milliGRAM(s) IV Push every 8 hours  montelukast 10 milliGRAM(s) Oral daily  pantoprazole    Tablet 40 milliGRAM(s) Oral before breakfast  senna 2 Tablet(s) Oral at bedtime    MEDICATIONS  (PRN):  ALPRAZolam 0.25 milliGRAM(s) Oral at bedtime PRN insomnia  dextrose 40% Gel 15 Gram(s) Oral once PRN Blood Glucose LESS THAN 70 milliGRAM(s)/deciliter  glucagon  Injectable 1 milliGRAM(s) IntraMuscular once PRN Glucose LESS THAN 70 milligrams/deciliter  metoclopramide 5 milliGRAM(s) Oral three times a day PRN Nausea  traMADol 25 milliGRAM(s) Oral every 4 hours PRN Severe Pain (7 - 10)      CAPILLARY BLOOD GLUCOSE      POCT Blood Glucose.: 258 mg/dL (16 Jan 2019 08:04)  POCT Blood Glucose.: 262 mg/dL (15 Facundo 2019 21:28)  POCT Blood Glucose.: 179 mg/dL (15 Facundo 2019 16:45)  POCT Blood Glucose.: 283 mg/dL (15 Facundo 2019 11:40)    I&O's Summary    15 Facundo 2019 07:01  -  16 Jan 2019 07:00  --------------------------------------------------------  IN: 770 mL / OUT: 2250 mL / NET: -1480 mL        PHYSICAL EXAM:  Vital Signs Last 24 Hrs  T(C): 36.8 (15 Facundo 2019 20:52), Max: 36.8 (15 Facundo 2019 20:52)  T(F): 98.2 (15 Facundo 2019 20:52), Max: 98.2 (15 Facundo 2019 20:52)  HR: 84 (15 Facundo 2019 20:52) (80 - 84)  BP: 135/77 (15 Facundo 2019 20:52) (112/73 - 135/77)  BP(mean): --  RR: 18 (15 Facundo 2019 20:52) (17 - 18)  SpO2: 98% (15 Facundo 2019 20:52) (96% - 98%)      GENERAL: NAD on nc  CHEST/LUNG: rhonchi b/l  HEART: s1 s2 irregularly irregular No murmurs  ABDOMEN: Soft, Nontender, Nondistended; Bowel sounds present  EXTREMITIES:  2+ Peripheral Pulses, No clubbing, cyanosis, or edema  NEUROLOGY: AAOx3; non-focal  SKIN: No rashes or lesions    LABS:                        11.3   9.38  )-----------( 312      ( 16 Jan 2019 08:17 )             36.2     01-16    139  |  98  |  15  ----------------------------<  295<H>  4.2   |  30  |  0.57    Ca    8.8      16 Jan 2019 07:02                    Consultant(s) Notes Reviewed:    pulm  Care Discussed with Consultants/Other Providers:  ALLEY Goodson

## 2019-01-16 NOTE — PROGRESS NOTE ADULT - PROBLEM SELECTOR PLAN 6
A1C is 8.3 likely due to being on chronic steroids- steroid induced hyperglcemua  - will increase to 8u humalog, and increase to lantus 12uqhs  - will likely need some oral agent on d/c, metformin or januvia A1C is 8.3 likely due to being on chronic steroids- steroid induced hyperglycemia  -  10u humalog, and increase to lantus 14uqhs  - will likely need some oral agent on d/c, metformin or januvia

## 2019-01-16 NOTE — PHARMACOTHERAPY INTERVENTION NOTE - COMMENTS
69 yo F with DM A1C 8.3 currently on methylprednisolone 20mg IV Q8H, lantus 12 units subcutaneously at bedtime, humalog 8 units subcutaneously three times daily, and humalog mod dose correctional scale. BG in past 24 hours were 240, 258, 262, 179. Recommendation made to increase lantus to 14 units subcutaneously at bedtime and humalog to 10 units subcutaneously three times daily, per steroid-induced hyperglycemia protocol.    Gerry Nielsen, PharmD  [phone]

## 2019-01-16 NOTE — PROGRESS NOTE ADULT - ASSESSMENT
67 y/o F w/asthma on chronic prednisone, tracheobronchomalacia s/p tracheo-bronchoplasty presenting with asthma exacerbation and hypoxemia secondary to coronavirus infection.     1/14-still bronchospastic-still on O2 and sob  1/15- off O2-D5 abx-better over all  1/16-stagnant to slightly worse--no change rx

## 2019-01-16 NOTE — PROGRESS NOTE ADULT - SUBJECTIVE AND OBJECTIVE BOX
CHIEF COMPLAINT:f/up sob, resp failure, asthmatic bronchitis, TBM, corona virus--heavy cough and nasal stuffiness, not same production here    Interval Events: sputum ?unable    REVIEW OF SYSTEMS:  Constitutional: No fevers or chills. No weight loss. + fatigue or generalized malaise.  Eyes: No itching or discharge from the eyes  ENT: No ear pain. No ear discharge. No nasal congestion. No post nasal drip. No epistaxis. No throat pain. No sore throat. No difficulty swallowing.   CV: No chest pain. No palpitations. No lightheadedness or dizziness.   Resp: No dyspnea at rest. No dyspnea on exertion. No orthopnea. No wheezing. + cough. No stridor. + sputum production. No chest pain with respiration.  GI: No nausea. No vomiting. No diarrhea.  MSK: No joint pain or pain in any extremities  Integumentary: No skin lesions. No pedal edema.  Neurological: No gross motor weakness. No sensory changes.  [ +] All other systems negative  [ ] Unable to assess ROS because ________    OBJECTIVE:  ICU Vital Signs Last 24 Hrs  T(C): 36.8 (15 Facundo 2019 20:52), Max: 36.8 (15 Facundo 2019 20:52)  T(F): 98.2 (15 Facundo 2019 20:52), Max: 98.2 (15 Facundo 2019 20:52)  HR: 84 (15 Facundo 2019 20:52) (80 - 84)  BP: 135/77 (15 Facundo 2019 20:52) (112/73 - 135/77)  BP(mean): --  ABP: --  ABP(mean): --  RR: 18 (15 Facundo 2019 20:52) (17 - 18)  SpO2: 98% (15 Facundo 2019 20:52) (96% - 98%)        01-14 @ 07:01  -  01-15 @ 07:00  --------------------------------------------------------  IN: 860 mL / OUT: 1300 mL / NET: -440 mL    01-15 @ 07:01  - 01-16 @ 05:25  --------------------------------------------------------  IN: 770 mL / OUT: 1750 mL / NET: -980 mL      CAPILLARY BLOOD GLUCOSE      POCT Blood Glucose.: 262 mg/dL (15 Facundo 2019 21:28)      PHYSICAL EXAM: NAD in bed-RA  General: Awake, alert, oriented X 3.   HEENT: Atraumatic, normocephalic.                 Mallampatti Grade 2                No nasal congestion.                No tonsillar or pharyngeal exudates.  Lymph Nodes: No palpable lymphadenopathy  Neck: No JVD. No carotid bruit.   Respiratory: Normal chest expansion                         Normal percussion                         Normal and equal air entry                         + wheeze and rhonchi but no rales.  Cardiovascular: S1 S2 normal. + murmurs, rubs or gallops.   Abdomen: Soft, non-tender, non-distended. No organomegaly. Normoactive bowel sounds.  Extremities: Warm to touch. Peripheral pulse palpable. No pedal edema.   Skin: No rashes or skin lesions  Neurological: Motor and sensory examination equal and normal in all four extremities.  Psychiatry: Appropriate mood and affect.    HOSPITAL MEDICATIONS:  MEDICATIONS  (STANDING):  ALBUTerol/ipratropium for Nebulization 3 milliLiter(s) Nebulizer every 6 hours  apixaban 5 milliGRAM(s) Oral every 12 hours  azithromycin  IVPB 500 milliGRAM(s) IV Intermittent every 24 hours  buDESOnide 160 MICROgram(s)/formoterol 4.5 MICROgram(s) Inhaler 2 Puff(s) Inhalation two times a day  cefepime   IVPB 2000 milliGRAM(s) IV Intermittent every 8 hours  chlorhexidine 4% Liquid 1 Application(s) Topical <User Schedule>  dextrose 5%. 1000 milliLiter(s) (50 mL/Hr) IV Continuous <Continuous>  dextrose 50% Injectable 12.5 Gram(s) IV Push once  dextrose 50% Injectable 25 Gram(s) IV Push once  digoxin     Tablet 0.25 milliGRAM(s) Oral daily  docusate sodium 100 milliGRAM(s) Oral three times a day  guaiFENesin  milliGRAM(s) Oral every 12 hours  influenza   Vaccine 0.5 milliLiter(s) IntraMuscular once  insulin glargine Injectable (LANTUS) 12 Unit(s) SubCutaneous at bedtime  insulin lispro (HumaLOG) corrective regimen sliding scale.   SubCutaneous three times a day before meals  insulin lispro (HumaLOG) corrective regimen sliding scale.   SubCutaneous at bedtime  insulin lispro Injectable (HumaLOG) 8 Unit(s) SubCutaneous three times a day before meals  methylPREDNISolone sodium succinate Injectable 20 milliGRAM(s) IV Push every 8 hours  montelukast 10 milliGRAM(s) Oral daily  pantoprazole    Tablet 40 milliGRAM(s) Oral before breakfast  senna 2 Tablet(s) Oral at bedtime    MEDICATIONS  (PRN):  ALPRAZolam 0.25 milliGRAM(s) Oral at bedtime PRN insomnia  dextrose 40% Gel 15 Gram(s) Oral once PRN Blood Glucose LESS THAN 70 milliGRAM(s)/deciliter  glucagon  Injectable 1 milliGRAM(s) IntraMuscular once PRN Glucose LESS THAN 70 milligrams/deciliter  metoclopramide 5 milliGRAM(s) Oral three times a day PRN Nausea  traMADol 25 milliGRAM(s) Oral every 4 hours PRN Severe Pain (7 - 10)      LABS:                        10.9   8.97  )-----------( 298      ( 15 Facundo 2019 08:10 )             35.4     01-15    140  |  101  |  13  ----------------------------<  301<H>  4.3   |  28  |  0.57    Ca    8.8      15 Facundo 2019 06:08                MICROBIOLOGY:     RADIOLOGY:  [ ] Reviewed and interpreted by me    Point of Care Ultrasound Findings:    PFT:    EKG:

## 2019-01-17 ENCOUNTER — APPOINTMENT (OUTPATIENT)
Dept: HEART AND VASCULAR | Facility: CLINIC | Age: 69
End: 2019-01-17

## 2019-01-17 LAB
ANION GAP SERPL CALC-SCNC: 13 MMOL/L — SIGNIFICANT CHANGE UP (ref 5–17)
BUN SERPL-MCNC: 18 MG/DL — SIGNIFICANT CHANGE UP (ref 7–23)
CALCIUM SERPL-MCNC: 8.7 MG/DL — SIGNIFICANT CHANGE UP (ref 8.4–10.5)
CHLORIDE SERPL-SCNC: 97 MMOL/L — SIGNIFICANT CHANGE UP (ref 96–108)
CO2 SERPL-SCNC: 28 MMOL/L — SIGNIFICANT CHANGE UP (ref 22–31)
CREAT SERPL-MCNC: 0.58 MG/DL — SIGNIFICANT CHANGE UP (ref 0.5–1.3)
GLUCOSE SERPL-MCNC: 298 MG/DL — HIGH (ref 70–99)
HCT VFR BLD CALC: 35.9 % — SIGNIFICANT CHANGE UP (ref 34.5–45)
HGB BLD-MCNC: 11.1 G/DL — LOW (ref 11.5–15.5)
MCHC RBC-ENTMCNC: 21.9 PG — LOW (ref 27–34)
MCHC RBC-ENTMCNC: 30.9 GM/DL — LOW (ref 32–36)
MCV RBC AUTO: 70.7 FL — LOW (ref 80–100)
PLATELET # BLD AUTO: 309 K/UL — SIGNIFICANT CHANGE UP (ref 150–400)
POTASSIUM SERPL-MCNC: 4.4 MMOL/L — SIGNIFICANT CHANGE UP (ref 3.5–5.3)
POTASSIUM SERPL-SCNC: 4.4 MMOL/L — SIGNIFICANT CHANGE UP (ref 3.5–5.3)
RBC # BLD: 5.08 M/UL — SIGNIFICANT CHANGE UP (ref 3.8–5.2)
RBC # FLD: 18.8 % — HIGH (ref 10.3–14.5)
SODIUM SERPL-SCNC: 138 MMOL/L — SIGNIFICANT CHANGE UP (ref 135–145)
WBC # BLD: 10.46 K/UL — SIGNIFICANT CHANGE UP (ref 3.8–10.5)
WBC # FLD AUTO: 10.46 K/UL — SIGNIFICANT CHANGE UP (ref 3.8–10.5)

## 2019-01-17 PROCEDURE — 99232 SBSQ HOSP IP/OBS MODERATE 35: CPT

## 2019-01-17 PROCEDURE — 99233 SBSQ HOSP IP/OBS HIGH 50: CPT

## 2019-01-17 RX ORDER — INSULIN GLARGINE 100 [IU]/ML
10 INJECTION, SOLUTION SUBCUTANEOUS ONCE
Qty: 0 | Refills: 0 | Status: COMPLETED | OUTPATIENT
Start: 2019-01-17 | End: 2019-01-17

## 2019-01-17 RX ORDER — SODIUM CHLORIDE 9 MG/ML
1000 INJECTION INTRAMUSCULAR; INTRAVENOUS; SUBCUTANEOUS
Qty: 0 | Refills: 0 | Status: DISCONTINUED | OUTPATIENT
Start: 2019-01-17 | End: 2019-01-19

## 2019-01-17 RX ORDER — BENZOCAINE AND MENTHOL 5; 1 G/100ML; G/100ML
1 LIQUID ORAL
Qty: 0 | Refills: 0 | Status: DISCONTINUED | OUTPATIENT
Start: 2019-01-17 | End: 2019-01-19

## 2019-01-17 RX ADMIN — Medication 10 UNIT(S): at 12:38

## 2019-01-17 RX ADMIN — APIXABAN 5 MILLIGRAM(S): 2.5 TABLET, FILM COATED ORAL at 05:48

## 2019-01-17 RX ADMIN — Medication 3 MILLILITER(S): at 17:07

## 2019-01-17 RX ADMIN — Medication 100 MILLIGRAM(S): at 13:40

## 2019-01-17 RX ADMIN — Medication 10 UNIT(S): at 17:07

## 2019-01-17 RX ADMIN — Medication 100 MILLIGRAM(S): at 21:21

## 2019-01-17 RX ADMIN — BUDESONIDE AND FORMOTEROL FUMARATE DIHYDRATE 2 PUFF(S): 160; 4.5 AEROSOL RESPIRATORY (INHALATION) at 05:49

## 2019-01-17 RX ADMIN — Medication 100 MILLIGRAM(S): at 05:49

## 2019-01-17 RX ADMIN — CEFEPIME 100 MILLIGRAM(S): 1 INJECTION, POWDER, FOR SOLUTION INTRAMUSCULAR; INTRAVENOUS at 13:40

## 2019-01-17 RX ADMIN — Medication 0.25 MILLIGRAM(S): at 23:01

## 2019-01-17 RX ADMIN — Medication 8: at 17:07

## 2019-01-17 RX ADMIN — CEFEPIME 100 MILLIGRAM(S): 1 INJECTION, POWDER, FOR SOLUTION INTRAMUSCULAR; INTRAVENOUS at 21:21

## 2019-01-17 RX ADMIN — CEFEPIME 100 MILLIGRAM(S): 1 INJECTION, POWDER, FOR SOLUTION INTRAMUSCULAR; INTRAVENOUS at 05:49

## 2019-01-17 RX ADMIN — MONTELUKAST 10 MILLIGRAM(S): 4 TABLET, CHEWABLE ORAL at 11:49

## 2019-01-17 RX ADMIN — Medication 5 MILLIGRAM(S): at 21:21

## 2019-01-17 RX ADMIN — BUDESONIDE AND FORMOTEROL FUMARATE DIHYDRATE 2 PUFF(S): 160; 4.5 AEROSOL RESPIRATORY (INHALATION) at 17:08

## 2019-01-17 RX ADMIN — SENNA PLUS 2 TABLET(S): 8.6 TABLET ORAL at 21:21

## 2019-01-17 RX ADMIN — Medication 20 MILLIGRAM(S): at 17:08

## 2019-01-17 RX ADMIN — INSULIN GLARGINE 10 UNIT(S): 100 INJECTION, SOLUTION SUBCUTANEOUS at 22:12

## 2019-01-17 RX ADMIN — Medication 10 UNIT(S): at 08:32

## 2019-01-17 RX ADMIN — CHLORHEXIDINE GLUCONATE 1 APPLICATION(S): 213 SOLUTION TOPICAL at 11:49

## 2019-01-17 RX ADMIN — AZITHROMYCIN 250 MILLIGRAM(S): 500 TABLET, FILM COATED ORAL at 01:42

## 2019-01-17 RX ADMIN — Medication 3 MILLILITER(S): at 11:49

## 2019-01-17 RX ADMIN — Medication 600 MILLIGRAM(S): at 05:49

## 2019-01-17 RX ADMIN — Medication 0.25 MILLIGRAM(S): at 00:43

## 2019-01-17 RX ADMIN — BENZOCAINE AND MENTHOL 1 LOZENGE: 5; 1 LIQUID ORAL at 17:10

## 2019-01-17 RX ADMIN — Medication 3 MILLILITER(S): at 23:01

## 2019-01-17 RX ADMIN — APIXABAN 5 MILLIGRAM(S): 2.5 TABLET, FILM COATED ORAL at 17:08

## 2019-01-17 RX ADMIN — Medication 1 SPRAY(S): at 05:48

## 2019-01-17 RX ADMIN — PANTOPRAZOLE SODIUM 40 MILLIGRAM(S): 20 TABLET, DELAYED RELEASE ORAL at 06:04

## 2019-01-17 RX ADMIN — Medication 4: at 12:38

## 2019-01-17 RX ADMIN — Medication 6: at 08:32

## 2019-01-17 RX ADMIN — Medication 20 MILLIGRAM(S): at 05:49

## 2019-01-17 RX ADMIN — Medication 600 MILLIGRAM(S): at 17:09

## 2019-01-17 RX ADMIN — Medication 1 SPRAY(S): at 17:08

## 2019-01-17 RX ADMIN — SODIUM CHLORIDE 75 MILLILITER(S): 9 INJECTION INTRAMUSCULAR; INTRAVENOUS; SUBCUTANEOUS at 12:56

## 2019-01-17 RX ADMIN — Medication 3 MILLILITER(S): at 05:48

## 2019-01-17 RX ADMIN — Medication 0.25 MILLIGRAM(S): at 05:48

## 2019-01-17 NOTE — SWALLOW BEDSIDE ASSESSMENT ADULT - ASR SWALLOW ASPIRATION MONITOR
fever/pneumonia/Monitor for s/s aspiration/laryngeal penetration. If noted:  D/C p.o. intake, provide non-oral nutrition/hydration/meds, and contact this service @ [phone]/change of breathing pattern/gurgly voice/cough/upper respiratory infection/throat clearing

## 2019-01-17 NOTE — SWALLOW BEDSIDE ASSESSMENT ADULT - COMMENTS
Continued H&P: In the ED, she was given antibiotics, and normal saline 1 L with Prednisone 40 mg tablet.  Blood cultures were collected. She received a neb treatment.  She feels well, no SOB, however, still requiring oxygen.  Pt admitted with COPD exacerbation 2/2 Coronavirus with ?viral vs. bacterial pneumonia of the RML/RLL.        Per H&P: Past surgical history includes: History of tracheomalacia   - attempted tracheal stenting ([facility])- course complicated by obstruction, respiratory failure, multiple CPR attempts -  stent discontinued; 10/20/2016 Tracheobronchoplasty (Prolene Mesh) performed at [facility] by Dr Zapien. S/P bronchoscopy  2018 - [place] [facility] (Dr Zapien) no evidence of tracheobronchomalacia in trachea or bronchial tubes.    : Pulm: presenting with asthma exacerbation and hypoxemia secondary to coronavirus infection.   : Hospitalist: bacterial pneumonia, ct chest noncon showing tree in bud opacities likely infectious. Acute hypoxemic respiratory failure 2/2 copd 2/2 coronovirus and bacterial pneumonia  Imagin/12: CT Chest: IMPRESSION: Tree-in-bud nodules in the right lung consistent with small airway mucoid impaction, likely infectious in etiology, not significant change. Bibasilar atelectasis. Minimal bibasilar bronchiectatic changes. Unchanged 1.0 cm right basilar pleural-based nodule.   CXRAY: Impression: Clear lungs.

## 2019-01-17 NOTE — PROGRESS NOTE ADULT - PROBLEM SELECTOR PLAN 6
A1C is 8.3 likely due to being on chronic steroids- steroid induced hyperglycemia  -  10u humalog, and c/w to lantus 14uqhs  - monitor FS closely now that steroids will be decreased  - will likely need some oral agent on d/c, metformin or januvia

## 2019-01-17 NOTE — PROGRESS NOTE ADULT - SUBJECTIVE AND OBJECTIVE BOX
Patient is a 68y old  Female who presents with a chief complaint of Cough, Fevers, Shortness of breath (17 Jan 2019 05:46)      SUBJECTIVE / OVERNIGHT EVENTS:    patient seen and examined at bedside.  not able to cough up much. feeling better though. no sob, fevers or chills.     ROS:  14 point ROS negative in detail except stated as above    MEDICATIONS  (STANDING):  ALBUTerol/ipratropium for Nebulization 3 milliLiter(s) Nebulizer every 6 hours  apixaban 5 milliGRAM(s) Oral every 12 hours  azithromycin  IVPB 500 milliGRAM(s) IV Intermittent every 24 hours  buDESOnide 160 MICROgram(s)/formoterol 4.5 MICROgram(s) Inhaler 2 Puff(s) Inhalation two times a day  cefepime   IVPB 2000 milliGRAM(s) IV Intermittent every 8 hours  chlorhexidine 4% Liquid 1 Application(s) Topical <User Schedule>  dextrose 5%. 1000 milliLiter(s) (50 mL/Hr) IV Continuous <Continuous>  dextrose 50% Injectable 12.5 Gram(s) IV Push once  dextrose 50% Injectable 25 Gram(s) IV Push once  digoxin     Tablet 0.25 milliGRAM(s) Oral daily  docusate sodium 100 milliGRAM(s) Oral three times a day  fluticasone propionate 50 MICROgram(s)/spray Nasal Spray 1 Spray(s) Both Nostrils two times a day  guaiFENesin  milliGRAM(s) Oral every 12 hours  influenza   Vaccine 0.5 milliLiter(s) IntraMuscular once  insulin glargine Injectable (LANTUS) 14 Unit(s) SubCutaneous at bedtime  insulin lispro (HumaLOG) corrective regimen sliding scale.   SubCutaneous three times a day before meals  insulin lispro (HumaLOG) corrective regimen sliding scale.   SubCutaneous at bedtime  insulin lispro Injectable (HumaLOG) 10 Unit(s) SubCutaneous three times a day before meals  methylPREDNISolone sodium succinate Injectable 20 milliGRAM(s) IV Push every 12 hours  montelukast 10 milliGRAM(s) Oral daily  pantoprazole    Tablet 40 milliGRAM(s) Oral before breakfast  senna 2 Tablet(s) Oral at bedtime    MEDICATIONS  (PRN):  ALPRAZolam 0.25 milliGRAM(s) Oral at bedtime PRN insomnia  benzocaine 15 mG/menthol 3.6 mG (Sugar-Free) Lozenge 1 Lozenge Oral every 2 hours PRN Sore Throat  dextrose 40% Gel 15 Gram(s) Oral once PRN Blood Glucose LESS THAN 70 milliGRAM(s)/deciliter  glucagon  Injectable 1 milliGRAM(s) IntraMuscular once PRN Glucose LESS THAN 70 milligrams/deciliter  metoclopramide 5 milliGRAM(s) Oral three times a day PRN Nausea  traMADol 25 milliGRAM(s) Oral every 4 hours PRN Severe Pain (7 - 10)      CAPILLARY BLOOD GLUCOSE      POCT Blood Glucose.: 213 mg/dL (17 Jan 2019 12:00)  POCT Blood Glucose.: 278 mg/dL (17 Jan 2019 07:57)  POCT Blood Glucose.: 273 mg/dL (16 Jan 2019 21:41)  POCT Blood Glucose.: 262 mg/dL (16 Jan 2019 17:04)  POCT Blood Glucose.: 240 mg/dL (16 Jan 2019 12:05)    I&O's Summary    16 Jan 2019 07:01  -  17 Jan 2019 07:00  --------------------------------------------------------  IN: 790 mL / OUT: 900 mL / NET: -110 mL    17 Jan 2019 07:01  -  17 Jan 2019 12:04  --------------------------------------------------------  IN: 240 mL / OUT: 0 mL / NET: 240 mL        PHYSICAL EXAM:  Vital Signs Last 24 Hrs  T(C): 36.4 (17 Jan 2019 05:33), Max: 37.2 (16 Jan 2019 21:25)  T(F): 97.5 (17 Jan 2019 05:33), Max: 98.9 (16 Jan 2019 21:25)  HR: 62 (17 Jan 2019 05:33) (62 - 90)  BP: 139/68 (17 Jan 2019 05:33) (119/69 - 139/68)  BP(mean): --  RR: 18 (17 Jan 2019 05:33) (18 - 18)  SpO2: 96% (17 Jan 2019 05:33) (96% - 97%)    GENERAL: NAD on nc  CHEST/LUNG: rhonchi b/l  HEART: s1 s2 irregularly irregular No murmurs  ABDOMEN: Soft, Nontender, Nondistended; Bowel sounds present + ostomy  EXTREMITIES:  2+ Peripheral Pulses, No clubbing, cyanosis, or edema  NEUROLOGY: AAOx3; non-focal  SKIN: No rashes or lesions      LABS:                        11.1   10.46 )-----------( 309      ( 17 Jan 2019 08:01 )             35.9     01-17    138  |  97  |  18  ----------------------------<  298<H>  4.4   |  28  |  0.58    Ca    8.7      17 Jan 2019 06:50                Consultant(s) Notes Reviewed:    pulm  Care Discussed with Consultants/Other Providers:  Lis lewis

## 2019-01-17 NOTE — PROGRESS NOTE ADULT - SUBJECTIVE AND OBJECTIVE BOX
CHIEF COMPLAINT: f/up sob, resp failure, TBM, asthma,?PNA-corona virus---slightly better-some mucus production    Interval Events: none    REVIEW OF SYSTEMS:  Constitutional: No fevers or chills. No weight loss. + fatigue or generalized malaise.  Eyes: No itching or discharge from the eyes  ENT: No ear pain. No ear discharge. No nasal congestion. No post nasal drip. No epistaxis. No throat pain. No sore throat. No difficulty swallowing.   CV: No chest pain. No palpitations. No lightheadedness or dizziness.   Resp: No dyspnea at rest. + dyspnea on exertion. No orthopnea. + wheezing. + cough. No stridor. No sputum production. No chest pain with respiration.  GI: No nausea. No vomiting. No diarrhea.  MSK: No joint pain or pain in any extremities  Integumentary: No skin lesions. No pedal edema.  Neurological: No gross motor weakness. No sensory changes.  [ +] All other systems negative  [ ] Unable to assess ROS because ________    OBJECTIVE:  ICU Vital Signs Last 24 Hrs  T(C): 36.4 (17 Jan 2019 05:33), Max: 37.2 (16 Jan 2019 21:25)  T(F): 97.5 (17 Jan 2019 05:33), Max: 98.9 (16 Jan 2019 21:25)  HR: 62 (17 Jan 2019 05:33) (62 - 90)  BP: 139/68 (17 Jan 2019 05:33) (119/69 - 139/68)  BP(mean): --  ABP: --  ABP(mean): --  RR: 18 (17 Jan 2019 05:33) (18 - 18)  SpO2: 96% (17 Jan 2019 05:33) (96% - 97%)        01-15 @ 07:01  -  01-16 @ 07:00  --------------------------------------------------------  IN: 770 mL / OUT: 2250 mL / NET: -1480 mL    01-16 @ 07:01  -  01-17 @ 05:46  --------------------------------------------------------  IN: 240 mL / OUT: 900 mL / NET: -660 mL      CAPILLARY BLOOD GLUCOSE      POCT Blood Glucose.: 273 mg/dL (16 Jan 2019 21:41)      PHYSICAL EXAM: NAD in bed on RA  General: Awake, alert, oriented X 3.   HEENT: Atraumatic, normocephalic.                 Mallampatti Grade 2                No nasal congestion.                No tonsillar or pharyngeal exudates.  Lymph Nodes: No palpable lymphadenopathy  Neck: No JVD. No carotid bruit.   Respiratory: Normal chest expansion                         Normal percussion                         Normal and equal air entry                         + wheeze, rhonchi but no rales.  Cardiovascular: S1 S2 normal. No murmurs, rubs or gallops.   Abdomen: Soft, non-tender, non-distended. No organomegaly. Normoactive bowel sounds. Ostomy in place  Extremities: Warm to touch. Peripheral pulse palpable. No pedal edema.   Skin: No rashes or skin lesions  Neurological: Motor and sensory examination equal and normal in all four extremities.  Psychiatry: Appropriate mood and affect.    HOSPITAL MEDICATIONS:  MEDICATIONS  (STANDING):  ALBUTerol/ipratropium for Nebulization 3 milliLiter(s) Nebulizer every 6 hours  apixaban 5 milliGRAM(s) Oral every 12 hours  azithromycin  IVPB 500 milliGRAM(s) IV Intermittent every 24 hours  buDESOnide 160 MICROgram(s)/formoterol 4.5 MICROgram(s) Inhaler 2 Puff(s) Inhalation two times a day  cefepime   IVPB 2000 milliGRAM(s) IV Intermittent every 8 hours  chlorhexidine 4% Liquid 1 Application(s) Topical <User Schedule>  dextrose 5%. 1000 milliLiter(s) (50 mL/Hr) IV Continuous <Continuous>  dextrose 50% Injectable 12.5 Gram(s) IV Push once  dextrose 50% Injectable 25 Gram(s) IV Push once  digoxin     Tablet 0.25 milliGRAM(s) Oral daily  docusate sodium 100 milliGRAM(s) Oral three times a day  fluticasone propionate 50 MICROgram(s)/spray Nasal Spray 1 Spray(s) Both Nostrils two times a day  guaiFENesin  milliGRAM(s) Oral every 12 hours  influenza   Vaccine 0.5 milliLiter(s) IntraMuscular once  insulin glargine Injectable (LANTUS) 14 Unit(s) SubCutaneous at bedtime  insulin lispro (HumaLOG) corrective regimen sliding scale.   SubCutaneous three times a day before meals  insulin lispro (HumaLOG) corrective regimen sliding scale.   SubCutaneous at bedtime  insulin lispro Injectable (HumaLOG) 10 Unit(s) SubCutaneous three times a day before meals  methylPREDNISolone sodium succinate Injectable 20 milliGRAM(s) IV Push every 8 hours  montelukast 10 milliGRAM(s) Oral daily  pantoprazole    Tablet 40 milliGRAM(s) Oral before breakfast  senna 2 Tablet(s) Oral at bedtime    MEDICATIONS  (PRN):  ALPRAZolam 0.25 milliGRAM(s) Oral at bedtime PRN insomnia  dextrose 40% Gel 15 Gram(s) Oral once PRN Blood Glucose LESS THAN 70 milliGRAM(s)/deciliter  glucagon  Injectable 1 milliGRAM(s) IntraMuscular once PRN Glucose LESS THAN 70 milligrams/deciliter  metoclopramide 5 milliGRAM(s) Oral three times a day PRN Nausea  traMADol 25 milliGRAM(s) Oral every 4 hours PRN Severe Pain (7 - 10)      LABS:                        11.3   9.38  )-----------( 312      ( 16 Jan 2019 08:17 )             36.2     01-16    139  |  98  |  15  ----------------------------<  295<H>  4.2   |  30  |  0.57    Ca    8.8      16 Jan 2019 07:02                MICROBIOLOGY:     RADIOLOGY:  [ ] Reviewed and interpreted by me    Point of Care Ultrasound Findings:    PFT:    EKG:

## 2019-01-17 NOTE — SWALLOW BEDSIDE ASSESSMENT ADULT - PHARYNGEAL PHASE
Within functional limits patient reports "my muscle feels the difference with the fig nieves because its thicker"

## 2019-01-17 NOTE — SWALLOW BEDSIDE ASSESSMENT ADULT - ASR SWALLOW DENTITION
lower denture placed for exam by patient. she reports daughter is bringing adhesive from home to secure lower denture./upper and lower dentures

## 2019-01-17 NOTE — SWALLOW BEDSIDE ASSESSMENT ADULT - SWALLOW EVAL: PATIENT/FAMILY GOALS STATEMENT
Pt reports s/s of dysphagia prior to admission but denies seeking medical attention. She endorses "sometimes I choke but everyone chokes". Patient reports difficulty when "drinking too fast" and " eating a large piece" of food. Patient endorses "it goes down slowly" pointing to anterior portion of neck if she consumes large pieces of food.

## 2019-01-17 NOTE — PROGRESS NOTE ADULT - ASSESSMENT
69 y/o F w/asthma on chronic prednisone, tracheobronchomalacia s/p tracheo-bronchoplasty presenting with asthma exacerbation and hypoxemia secondary to coronavirus infection.     1/14-still bronchospastic-still on O2 and sob  1/15- off O2-D5 abx-better over all  1/16-stagnant to slightly worse--no change rx  1/17-slightly better--can change dose to 20 q 12----would perform Sp and Sw evaln

## 2019-01-17 NOTE — SWALLOW BEDSIDE ASSESSMENT ADULT - SWALLOW EVAL: DIAGNOSIS
Pt presents with a mild oral and suspected pharyngeal dysphagia. The oral stage of swallow is characterized by slow but adequate mastication on soft solid and hard solid foods. Per patient, daughter to bring adhesive for lower denture which is believed to be contributing factor to reduced oral management skills. Patient reports a "slow" passage of food as she points to anterior portion of neck. Pt does endorse s/s of dysphagia prior to admit, "sometimes it kind of gets stuck but it works it's way down" No overt, clinical s/s of laryngeal penetration/aspiration on exam.

## 2019-01-17 NOTE — SWALLOW BEDSIDE ASSESSMENT ADULT - ASR SWALLOW RECOMMEND DIAG
Unable to r/o silent aspiration. If MD concerned for aspiration, suggest objective swallow study via Modified Barium  Swallow. MD, PLEASE ENTER ORDER FOR MODIFIED BARIUM SWALLOW STUDY (ENTER UNDER RADIOLOGY EXAMS THE FOLLOWING WAY: GO TO THE BROWSER AND TYPE IN XRAY, THEN HIT THE SPACEBAR, AND TYPE IN THE LETTER M.)  WILL SCHEDULE EXAM UPON RECEIPT IN RADIOLOGY.

## 2019-01-17 NOTE — SWALLOW BEDSIDE ASSESSMENT ADULT - SLP GENERAL OBSERVATIONS
Pt found OOB in chair. Pt on room air. A&O x3. Cooperative for exam. Pt follows directions for evaluation purposes and communicates needs.

## 2019-01-17 NOTE — SWALLOW BEDSIDE ASSESSMENT ADULT - SWALLOW EVAL: RECOMMENDED FEEDING/EATING TECHNIQUES
position upright (90 degrees)/oral hygiene/small sips/bites/maintain upright posture during/after eating for 30 mins

## 2019-01-18 LAB
ANION GAP SERPL CALC-SCNC: 9 MMOL/L — SIGNIFICANT CHANGE UP (ref 5–17)
BUN SERPL-MCNC: 16 MG/DL — SIGNIFICANT CHANGE UP (ref 7–23)
CALCIUM SERPL-MCNC: 8.6 MG/DL — SIGNIFICANT CHANGE UP (ref 8.4–10.5)
CHLORIDE SERPL-SCNC: 102 MMOL/L — SIGNIFICANT CHANGE UP (ref 96–108)
CO2 SERPL-SCNC: 29 MMOL/L — SIGNIFICANT CHANGE UP (ref 22–31)
CREAT SERPL-MCNC: 0.65 MG/DL — SIGNIFICANT CHANGE UP (ref 0.5–1.3)
GLUCOSE SERPL-MCNC: 196 MG/DL — HIGH (ref 70–99)
GRAM STN FLD: SIGNIFICANT CHANGE UP
HCT VFR BLD CALC: 35.6 % — SIGNIFICANT CHANGE UP (ref 34.5–45)
HGB BLD-MCNC: 11 G/DL — LOW (ref 11.5–15.5)
MCHC RBC-ENTMCNC: 21.8 PG — LOW (ref 27–34)
MCHC RBC-ENTMCNC: 30.9 GM/DL — LOW (ref 32–36)
MCV RBC AUTO: 70.6 FL — LOW (ref 80–100)
PLATELET # BLD AUTO: 308 K/UL — SIGNIFICANT CHANGE UP (ref 150–400)
POTASSIUM SERPL-MCNC: 4.1 MMOL/L — SIGNIFICANT CHANGE UP (ref 3.5–5.3)
POTASSIUM SERPL-SCNC: 4.1 MMOL/L — SIGNIFICANT CHANGE UP (ref 3.5–5.3)
RBC # BLD: 5.04 M/UL — SIGNIFICANT CHANGE UP (ref 3.8–5.2)
RBC # FLD: 18.9 % — HIGH (ref 10.3–14.5)
SODIUM SERPL-SCNC: 140 MMOL/L — SIGNIFICANT CHANGE UP (ref 135–145)
SPECIMEN SOURCE: SIGNIFICANT CHANGE UP
WBC # BLD: 9.47 K/UL — SIGNIFICANT CHANGE UP (ref 3.8–10.5)
WBC # FLD AUTO: 9.47 K/UL — SIGNIFICANT CHANGE UP (ref 3.8–10.5)

## 2019-01-18 PROCEDURE — 99232 SBSQ HOSP IP/OBS MODERATE 35: CPT

## 2019-01-18 PROCEDURE — 99239 HOSP IP/OBS DSCHRG MGMT >30: CPT

## 2019-01-18 RX ORDER — INSULIN GLARGINE 100 [IU]/ML
10 INJECTION, SOLUTION SUBCUTANEOUS ONCE
Qty: 0 | Refills: 0 | Status: COMPLETED | OUTPATIENT
Start: 2019-01-18 | End: 2019-01-18

## 2019-01-18 RX ADMIN — Medication 100 MILLIGRAM(S): at 12:37

## 2019-01-18 RX ADMIN — SENNA PLUS 2 TABLET(S): 8.6 TABLET ORAL at 21:41

## 2019-01-18 RX ADMIN — MONTELUKAST 10 MILLIGRAM(S): 4 TABLET, CHEWABLE ORAL at 12:37

## 2019-01-18 RX ADMIN — Medication 20 MILLIGRAM(S): at 17:09

## 2019-01-18 RX ADMIN — Medication 0.25 MILLIGRAM(S): at 21:55

## 2019-01-18 RX ADMIN — PANTOPRAZOLE SODIUM 40 MILLIGRAM(S): 20 TABLET, DELAYED RELEASE ORAL at 06:04

## 2019-01-18 RX ADMIN — Medication 10 UNIT(S): at 08:41

## 2019-01-18 RX ADMIN — APIXABAN 5 MILLIGRAM(S): 2.5 TABLET, FILM COATED ORAL at 05:50

## 2019-01-18 RX ADMIN — Medication 10 UNIT(S): at 17:07

## 2019-01-18 RX ADMIN — Medication 0.25 MILLIGRAM(S): at 05:52

## 2019-01-18 RX ADMIN — BENZOCAINE AND MENTHOL 1 LOZENGE: 5; 1 LIQUID ORAL at 17:14

## 2019-01-18 RX ADMIN — Medication 600 MILLIGRAM(S): at 17:09

## 2019-01-18 RX ADMIN — Medication 8: at 12:37

## 2019-01-18 RX ADMIN — Medication 1 SPRAY(S): at 05:51

## 2019-01-18 RX ADMIN — AZITHROMYCIN 250 MILLIGRAM(S): 500 TABLET, FILM COATED ORAL at 02:17

## 2019-01-18 RX ADMIN — BENZOCAINE AND MENTHOL 1 LOZENGE: 5; 1 LIQUID ORAL at 21:43

## 2019-01-18 RX ADMIN — Medication 100 MILLIGRAM(S): at 05:50

## 2019-01-18 RX ADMIN — Medication 5 MILLIGRAM(S): at 21:41

## 2019-01-18 RX ADMIN — BENZOCAINE AND MENTHOL 1 LOZENGE: 5; 1 LIQUID ORAL at 05:51

## 2019-01-18 RX ADMIN — CEFEPIME 100 MILLIGRAM(S): 1 INJECTION, POWDER, FOR SOLUTION INTRAMUSCULAR; INTRAVENOUS at 05:54

## 2019-01-18 RX ADMIN — Medication 1 SPRAY(S): at 17:09

## 2019-01-18 RX ADMIN — BUDESONIDE AND FORMOTEROL FUMARATE DIHYDRATE 2 PUFF(S): 160; 4.5 AEROSOL RESPIRATORY (INHALATION) at 05:52

## 2019-01-18 RX ADMIN — Medication 600 MILLIGRAM(S): at 05:50

## 2019-01-18 RX ADMIN — BUDESONIDE AND FORMOTEROL FUMARATE DIHYDRATE 2 PUFF(S): 160; 4.5 AEROSOL RESPIRATORY (INHALATION) at 17:09

## 2019-01-18 RX ADMIN — INSULIN GLARGINE 10 UNIT(S): 100 INJECTION, SOLUTION SUBCUTANEOUS at 21:42

## 2019-01-18 RX ADMIN — Medication 10 UNIT(S): at 12:36

## 2019-01-18 RX ADMIN — BENZOCAINE AND MENTHOL 1 LOZENGE: 5; 1 LIQUID ORAL at 12:37

## 2019-01-18 RX ADMIN — APIXABAN 5 MILLIGRAM(S): 2.5 TABLET, FILM COATED ORAL at 17:09

## 2019-01-18 RX ADMIN — Medication 3 MILLILITER(S): at 12:37

## 2019-01-18 RX ADMIN — Medication 3 MILLILITER(S): at 05:52

## 2019-01-18 RX ADMIN — Medication 3 MILLILITER(S): at 17:08

## 2019-01-18 RX ADMIN — Medication 2: at 08:41

## 2019-01-18 RX ADMIN — Medication 100 MILLIGRAM(S): at 21:41

## 2019-01-18 RX ADMIN — CHLORHEXIDINE GLUCONATE 1 APPLICATION(S): 213 SOLUTION TOPICAL at 12:37

## 2019-01-18 RX ADMIN — Medication 20 MILLIGRAM(S): at 05:50

## 2019-01-18 NOTE — PROGRESS NOTE ADULT - PROBLEM SELECTOR PROBLEM 2
Asthmatic bronchitis
Acute hypoxemic respiratory failure
Asthmatic bronchitis

## 2019-01-18 NOTE — PROGRESS NOTE ADULT - PROBLEM SELECTOR PLAN 1
multifactorial--baseline severe asthma, TBM, bronchiectasis-and now corona virus    O2 2 liters NC to keep sat above 90%-weaned off to RA
- viral v bacterial pneumonia, ct chest noncon pending   - c/w cefipime/azithromycin day 2  - Continue solumedrol 20mg iv q8hr  - Continue duonebs Q6H standing  - Continue symbicort  - Pulm consult appreciated
bacterial pneumonia, ct chest noncon showing tree in bud opacities likely infectious  - c/w cefipime/azithromycin day 3  - Continue solumedrol 20mg iv q8hr  - Continue duonebs Q6H standing  - Continue symbicort  - Pulm consult appreciated
bacterial pneumonia, ct chest noncon showing tree in bud opacities likely infectious  - c/w cefipime/azithromycin day 4  - Continue solumedrol 20mg iv q8hr  - Continue duonebs Q6H standing  - Continue symbicort  - Pulm consult appreciated, sputum cx pending
bacterial pneumonia, ct chest noncon showing tree in bud opacities likely infectious  - c/w cefipime/azithromycin day 5  - Continue solumedrol 20mg iv q8hr, if continues to improve will switch to medrol   - Continue duonebs Q6H standing  - Continue symbicort  - Pulm consult appreciated, sputum cx pending
bacterial pneumonia, ct chest noncon showing tree in bud opacities likely infectious  - c/w cefipime/azithromycin day 6  - Continue solumedrol 20mg iv q8hr if continues to improve will switch to medrol tomorrow  - Continue duonebs Q6H standing  - Continue symbicort  - Pulm consult appreciated, sputum cx pending
bacterial pneumonia, ct chest noncon showing tree in bud opacities likely infectious  - c/w cefipime/azithromycin day 7/7  - steroid taper  - Continue duonebs Q6H standing  - Continue symbicort  - s/s eval good passes
bacterial pneumonia, ct chest noncon showing tree in bud opacities likely infectious  - c/w cefipime/azithromycin day 7/7  - titrate solumedrol 20mg iv q8hr to q12hr  - Continue duonebs Q6H standing  - Continue symbicort  - Pulm consult appreciated, s/s eval pending given tracheomalacia, unable to obtain sputum cx because pt is not producing much or very little, will monitor
multifactorial--baseline severe asthma, TBM, bronchiectasis-and now corona virus    O2 2 liters NC to keep sat above 90%-weaned off to RA
multifactorial--baseline severe asthma, TBM, bronchiectasis-and now corona virus    O2 2 liters NC to keep sat above 90%-weaned off to RA
multifactorial--baseline severe asthma, TBM, bronchiectasis-and now corona virus    O2 2 liters NC to keep sat above 90%
multifactorial--baseline severe asthma, TBM, bronchiectasis-and now corona virus    O2 2 liters NC to keep sat above 90%-weaned off to RA

## 2019-01-18 NOTE — PROGRESS NOTE ADULT - SUBJECTIVE AND OBJECTIVE BOX
CHIEF COMPLAINT: f/up resp failure, corona virus induced PNA/bronchitis, bronchiectasis, asthma, TBM--better as per pt--less cough and wheeze    Interval Events: sp and sw "OK"    REVIEW OF SYSTEMS:  Constitutional: No fevers or chills. No weight loss. + fatigue or generalized malaise.  Eyes: No itching or discharge from the eyes  ENT: No ear pain. No ear discharge. No nasal congestion. No post nasal drip. No epistaxis. No throat pain. No sore throat. No difficulty swallowing.   CV: No chest pain. No palpitations. No lightheadedness or dizziness.   Resp: No dyspnea at rest. No dyspnea on exertion. No orthopnea. + wheezing.+ cough. No stridor. + sputum production. No chest pain with respiration.  GI: No nausea. No vomiting. No diarrhea.  MSK: No joint pain or pain in any extremities  Integumentary: No skin lesions. No pedal edema.  Neurological: No gross motor weakness. No sensory changes.  [+ ] All other systems negative  [ ] Unable to assess ROS because ________    OBJECTIVE:  ICU Vital Signs Last 24 Hrs  T(C): 37.2 (17 Jan 2019 21:05), Max: 37.2 (17 Jan 2019 21:05)  T(F): 98.9 (17 Jan 2019 21:05), Max: 98.9 (17 Jan 2019 21:05)  HR: 89 (17 Jan 2019 21:05) (62 - 89)  BP: 133/73 (17 Jan 2019 21:05) (110/57 - 139/68)  BP(mean): --  ABP: --  ABP(mean): --  RR: 18 (17 Jan 2019 21:05) (18 - 18)  SpO2: 97% (17 Jan 2019 21:05) (96% - 97%)        01-16 @ 07:01  -  01-17 @ 07:00  --------------------------------------------------------  IN: 790 mL / OUT: 900 mL / NET: -110 mL    01-17 @ 07:01  -  01-18 @ 05:32  --------------------------------------------------------  IN: 650 mL / OUT: 0 mL / NET: 650 mL      CAPILLARY BLOOD GLUCOSE      POCT Blood Glucose.: 91 mg/dL (17 Jan 2019 21:36)      PHYSICAL EXAM: NAD in bed  General: Awake, alert, oriented X 3.   HEENT: Atraumatic, normocephalic.                 Mallampatti Grade 2                No nasal congestion.                No tonsillar or pharyngeal exudates.  Lymph Nodes: No palpable lymphadenopathy  Neck: No JVD. No carotid bruit.   Respiratory: Normal chest expansion                         Normal percussion                         Normal and equal air entry                         + exp wheeze and rhonchi but no rales.  Cardiovascular: S1 S2 normal. No murmurs, rubs or gallops.   Abdomen: Soft, non-tender, non-distended. No organomegaly. Normoactive bowel sounds. Ostomy in place  Extremities: Warm to touch. Peripheral pulse palpable. No pedal edema.   Skin: No rashes or skin lesions  Neurological: Motor and sensory examination equal and normal in all four extremities.  Psychiatry: Appropriate mood and affect.    HOSPITAL MEDICATIONS:  MEDICATIONS  (STANDING):  ALBUTerol/ipratropium for Nebulization 3 milliLiter(s) Nebulizer every 6 hours  apixaban 5 milliGRAM(s) Oral every 12 hours  azithromycin  IVPB 500 milliGRAM(s) IV Intermittent every 24 hours  bisacodyl 5 milliGRAM(s) Oral at bedtime  buDESOnide 160 MICROgram(s)/formoterol 4.5 MICROgram(s) Inhaler 2 Puff(s) Inhalation two times a day  cefepime   IVPB 2000 milliGRAM(s) IV Intermittent every 8 hours  chlorhexidine 4% Liquid 1 Application(s) Topical <User Schedule>  dextrose 5%. 1000 milliLiter(s) (50 mL/Hr) IV Continuous <Continuous>  dextrose 50% Injectable 12.5 Gram(s) IV Push once  dextrose 50% Injectable 25 Gram(s) IV Push once  digoxin     Tablet 0.25 milliGRAM(s) Oral daily  docusate sodium 100 milliGRAM(s) Oral three times a day  fluticasone propionate 50 MICROgram(s)/spray Nasal Spray 1 Spray(s) Both Nostrils two times a day  guaiFENesin  milliGRAM(s) Oral every 12 hours  influenza   Vaccine 0.5 milliLiter(s) IntraMuscular once  insulin glargine Injectable (LANTUS) 14 Unit(s) SubCutaneous at bedtime  insulin lispro (HumaLOG) corrective regimen sliding scale.   SubCutaneous three times a day before meals  insulin lispro (HumaLOG) corrective regimen sliding scale.   SubCutaneous at bedtime  insulin lispro Injectable (HumaLOG) 10 Unit(s) SubCutaneous three times a day before meals  methylPREDNISolone sodium succinate Injectable 20 milliGRAM(s) IV Push every 12 hours  montelukast 10 milliGRAM(s) Oral daily  pantoprazole    Tablet 40 milliGRAM(s) Oral before breakfast  senna 2 Tablet(s) Oral at bedtime  sodium chloride 0.9%. 1000 milliLiter(s) (75 mL/Hr) IV Continuous <Continuous>    MEDICATIONS  (PRN):  ALPRAZolam 0.25 milliGRAM(s) Oral at bedtime PRN insomnia  benzocaine 15 mG/menthol 3.6 mG (Sugar-Free) Lozenge 1 Lozenge Oral every 2 hours PRN Sore Throat  dextrose 40% Gel 15 Gram(s) Oral once PRN Blood Glucose LESS THAN 70 milliGRAM(s)/deciliter  glucagon  Injectable 1 milliGRAM(s) IntraMuscular once PRN Glucose LESS THAN 70 milligrams/deciliter  metoclopramide 5 milliGRAM(s) Oral three times a day PRN Nausea  traMADol 25 milliGRAM(s) Oral every 4 hours PRN Severe Pain (7 - 10)      LABS:                        11.1   10.46 )-----------( 309      ( 17 Jan 2019 08:01 )             35.9     01-17    138  |  97  |  18  ----------------------------<  298<H>  4.4   |  28  |  0.58    Ca    8.7      17 Jan 2019 06:50                MICROBIOLOGY:     RADIOLOGY:  [ ] Reviewed and interpreted by me    Point of Care Ultrasound Findings:    PFT:    EKG:

## 2019-01-18 NOTE — PROGRESS NOTE ADULT - ASSESSMENT
67 y/o F w/asthma on chronic prednisone, tracheobronchomalacia s/p tracheo-bronchoplasty presenting with asthma exacerbation and hypoxemia secondary to coronavirus infection.     1/14-still bronchospastic-still on O2 and sob  1/15- off O2-D5 abx-better over all  1/16-stagnant to slightly worse--no change rx  1/17-slightly better--can change dose to 20 q 12----would perform Sp and Sw evaln  1/18-passed sp/sw evaln, better-completed abx--would change to PO medrol 16 am and 16 pm

## 2019-01-18 NOTE — PROGRESS NOTE ADULT - PROBLEM SELECTOR PROBLEM 6
GERD (gastroesophageal reflux disease)
GERD (gastroesophageal reflux disease)
Diabetes
GERD (gastroesophageal reflux disease)

## 2019-01-18 NOTE — PROGRESS NOTE ADULT - PROBLEM SELECTOR PROBLEM 7
Atrial fibrillation
Need for prophylactic measure
Atrial fibrillation

## 2019-01-18 NOTE — PROGRESS NOTE ADULT - PROBLEM SELECTOR PROBLEM 4
Tracheobronchomalacia
Tracheobronchomalacia
Colorectal cancer
Tracheobronchomalacia

## 2019-01-18 NOTE — PROGRESS NOTE ADULT - SUBJECTIVE AND OBJECTIVE BOX
Patient is a 68y old  Female who presents with a chief complaint of Cough, Fevers, Shortness of breath (18 Jan 2019 05:32)      SUBJECTIVE / OVERNIGHT EVENTS:    patient seen and examined at bedside. feels well. no BM. requesting enema through ostomy.  feels good. cough improved.    ROS:  14 point ROS negative in detail except stated as above    MEDICATIONS  (STANDING):  ALBUTerol/ipratropium for Nebulization 3 milliLiter(s) Nebulizer every 6 hours  apixaban 5 milliGRAM(s) Oral every 12 hours  bisacodyl 5 milliGRAM(s) Oral at bedtime  buDESOnide 160 MICROgram(s)/formoterol 4.5 MICROgram(s) Inhaler 2 Puff(s) Inhalation two times a day  chlorhexidine 4% Liquid 1 Application(s) Topical <User Schedule>  dextrose 5%. 1000 milliLiter(s) (50 mL/Hr) IV Continuous <Continuous>  dextrose 50% Injectable 12.5 Gram(s) IV Push once  dextrose 50% Injectable 25 Gram(s) IV Push once  digoxin     Tablet 0.25 milliGRAM(s) Oral daily  docusate sodium 100 milliGRAM(s) Oral three times a day  fluticasone propionate 50 MICROgram(s)/spray Nasal Spray 1 Spray(s) Both Nostrils two times a day  guaiFENesin  milliGRAM(s) Oral every 12 hours  influenza   Vaccine 0.5 milliLiter(s) IntraMuscular once  insulin glargine Injectable (LANTUS) 14 Unit(s) SubCutaneous at bedtime  insulin lispro (HumaLOG) corrective regimen sliding scale.   SubCutaneous three times a day before meals  insulin lispro (HumaLOG) corrective regimen sliding scale.   SubCutaneous at bedtime  insulin lispro Injectable (HumaLOG) 10 Unit(s) SubCutaneous three times a day before meals  methylPREDNISolone sodium succinate Injectable 20 milliGRAM(s) IV Push every 12 hours  montelukast 10 milliGRAM(s) Oral daily  pantoprazole    Tablet 40 milliGRAM(s) Oral before breakfast  senna 2 Tablet(s) Oral at bedtime  sodium chloride 0.9%. 1000 milliLiter(s) (75 mL/Hr) IV Continuous <Continuous>    MEDICATIONS  (PRN):  ALPRAZolam 0.25 milliGRAM(s) Oral at bedtime PRN insomnia  benzocaine 15 mG/menthol 3.6 mG (Sugar-Free) Lozenge 1 Lozenge Oral every 2 hours PRN Sore Throat  dextrose 40% Gel 15 Gram(s) Oral once PRN Blood Glucose LESS THAN 70 milliGRAM(s)/deciliter  glucagon  Injectable 1 milliGRAM(s) IntraMuscular once PRN Glucose LESS THAN 70 milligrams/deciliter  metoclopramide 5 milliGRAM(s) Oral three times a day PRN Nausea  traMADol 25 milliGRAM(s) Oral every 4 hours PRN Severe Pain (7 - 10)      CAPILLARY BLOOD GLUCOSE      POCT Blood Glucose.: 197 mg/dL (18 Jan 2019 08:09)  POCT Blood Glucose.: 91 mg/dL (17 Jan 2019 21:36)  POCT Blood Glucose.: 302 mg/dL (17 Jan 2019 16:44)  POCT Blood Glucose.: 213 mg/dL (17 Jan 2019 12:00)    I&O's Summary    17 Jan 2019 07:01  -  18 Jan 2019 07:00  --------------------------------------------------------  IN: 1340 mL / OUT: 900 mL / NET: 440 mL    18 Jan 2019 07:01  -  18 Jan 2019 11:18  --------------------------------------------------------  IN: 240 mL / OUT: 0 mL / NET: 240 mL        PHYSICAL EXAM:  Vital Signs Last 24 Hrs  T(C): 36.7 (18 Jan 2019 05:54), Max: 37.2 (17 Jan 2019 21:05)  T(F): 98 (18 Jan 2019 05:54), Max: 98.9 (17 Jan 2019 21:05)  HR: 61 (18 Jan 2019 05:54) (61 - 89)  BP: 128/66 (18 Jan 2019 05:54) (110/57 - 133/73)  BP(mean): --  RR: 18 (18 Jan 2019 05:54) (18 - 18)  SpO2: 98% (18 Jan 2019 05:54) (97% - 98%)      GENERAL: NAD on nc  CHEST/LUNG: rhonchi b/l  HEART: s1 s2 irregularly irregular No murmurs  ABDOMEN: Soft, Nontender, Nondistended; Bowel sounds present + ostomy  EXTREMITIES:  2+ Peripheral Pulses, No clubbing, cyanosis, or edema  NEUROLOGY: AAOx3; non-focal  SKIN: No rashes or lesions      LABS:                        11.0   9.47  )-----------( 308      ( 18 Jan 2019 07:14 )             35.6     01-18    140  |  102  |  16  ----------------------------<  196<H>  4.1   |  29  |  0.65    Ca    8.6      18 Jan 2019 06:03                Consultant(s) Notes Reviewed:    pulm  Care Discussed with Consultants/Other Providers:  ALLEY monge

## 2019-01-18 NOTE — PROGRESS NOTE ADULT - PROBLEM SELECTOR PLAN 3
on empiric cefepime and zithromax (corona virus--pt always has mucus)-D5  plse send sputum c and s
on empiric cefepime and zithromax (corona virus--pt always has mucus)-D7 s/p  check sputum c and s
Pt has underlying lung disease, with chronic cough, sputum production. Uses chest vest at home, will continue here.  Uses [place] as well.  Continuing inhalers. Pulm consulted, appreciate recs.
on empiric cefepime and zithromax (corona virus--pt always has mucus)-D6  plse send sputum c and s
on empiric cefepime and zithromax (corona virus--pt always has mucus)-D7  plse send sputum c and s
on empiric cefepime and zithromax (corona virus--pt always has mucus)  plse send sputum c and s

## 2019-01-18 NOTE — PROGRESS NOTE ADULT - PROBLEM SELECTOR PLAN 5
acapella  CPT by vest rx 4 x per day
acapella  CPT by vest rx 4 x per day (only receiving 2x per day)
Continue eliquis and digoxin
acapella  CPT by vest rx 4 x per day (only receiving 2x per day)
acapella  CPT by vest rx 4 x per day (only receiving 2x per day)
acapella  CPT by vest rx 4 x per day

## 2019-01-18 NOTE — PROGRESS NOTE ADULT - PROBLEM SELECTOR PLAN 7
mult rx
mult rx
Eliquis for DVT proph  PT consult
Eliquis for DVT proph  opt PT
mult rx

## 2019-01-18 NOTE — PROGRESS NOTE ADULT - PROBLEM SELECTOR PLAN 2
solumedrol 20  q12--can change ot oral medrol 16 am and pm  symbicort 160 2 bid, spiriva 1 q day, singulair 10 qhs  duoneb via n q 6
2/2 copd 2/2 coronovirus and bacterial pneumonia  monitor 02 sat  wean off of 02  chest vest, chest pt  acapella
2/2 copd 2/2 coronovirus and bacterial pneumonia  monitor 02 sat  wean off of 02  chest vest, chest pt  acapella
2/2 copd 2/2 coronovirus and bacterial pneumonia  off of oxygen  chest vest 5x a day chest pt  acaperolaa
2/2 copd 2/2 coronovirus and bacterial pneumonia  off of oxygen  chest vest, chest pt  acapella
2/2 copd 2/2 coronovirus or bacterial pneumonia  monitor 02 sat  ct chest noncon
solumedrol 20 q 8 to continue--today to q12  symbicort 160 2 bid, spiriva 1 q day, singulair 10 qhs  duoneb via n q 6
solumedrol 20 q 8 to continue--tomorrow to PO medrol 32mg if better  symbicort 160 2 bid, spiriva 1 q day, singulair 10 qhs  duoneb via n q 6
solumedrol 20 q 8  symbicort 160 2 bid, spiriva 1 q day, singulair 10 qhs  duoneb via n q 6
solumedrol 20 q 8-tomorrow to PO medrol 32mg  symbicort 160 2 bid, spiriva 1 q day, singulair 10 qhs  duoneb via hhn q 6

## 2019-01-18 NOTE — PROGRESS NOTE ADULT - PROBLEM SELECTOR PLAN 8
DVT prophlaxis:  subcutaneous lovenox or heparin as per primary team  sequential teds stockings  early ambulation  PT evaluation  early ambulation  incentive spirometry

## 2019-01-18 NOTE — PROGRESS NOTE ADULT - PROBLEM SELECTOR PROBLEM 3
Viral bronchitis
Tracheobronchomalacia
Viral bronchitis

## 2019-01-18 NOTE — PROGRESS NOTE ADULT - PROBLEM SELECTOR PLAN 4
s/p tracheoplasty
s/p tracheoplasty
Continue home medications, reglan prn, stool softener.
s/p tracheoplasty

## 2019-01-18 NOTE — CDI QUERY NOTE - NSCDIOTHERTXTBX_GEN_ALL_CORE_HH
QUERY:  68F admitted with bacterial pneumonia, tracheobronchomalacia, underlying lung disease. Uses chest vest at home, acapella, inhalers.  1/18/19 note: bacterial pneumonia, Chest CT showing tree in bud opacities likely infectious. Acute hypoxemic respiratory failure 2/2 copd 2/2 coronovirus and bacterial pneumonia  On IV Cefipime/Azithromycin   ***Please specify the likely/ probable etiology of this patient's bacterial PNA  e.g. possible gram negative PNA        possible gram positive PNA        aspiration PNA           other           THANK YOU

## 2019-01-18 NOTE — PROGRESS NOTE ADULT - PROBLEM SELECTOR PROBLEM 1
Acute hypoxemic respiratory failure
Pneumonia
Acute hypoxemic respiratory failure
Acute hypoxemic respiratory failure

## 2019-01-19 VITALS
RESPIRATION RATE: 18 BRPM | TEMPERATURE: 98 F | DIASTOLIC BLOOD PRESSURE: 64 MMHG | SYSTOLIC BLOOD PRESSURE: 107 MMHG | HEART RATE: 79 BPM | OXYGEN SATURATION: 95 %

## 2019-01-19 PROCEDURE — 83036 HEMOGLOBIN GLYCOSYLATED A1C: CPT

## 2019-01-19 PROCEDURE — 84145 PROCALCITONIN (PCT): CPT

## 2019-01-19 PROCEDURE — 83735 ASSAY OF MAGNESIUM: CPT

## 2019-01-19 PROCEDURE — 85610 PROTHROMBIN TIME: CPT

## 2019-01-19 PROCEDURE — 97161 PT EVAL LOW COMPLEX 20 MIN: CPT

## 2019-01-19 PROCEDURE — 71045 X-RAY EXAM CHEST 1 VIEW: CPT

## 2019-01-19 PROCEDURE — 87633 RESP VIRUS 12-25 TARGETS: CPT

## 2019-01-19 PROCEDURE — 82962 GLUCOSE BLOOD TEST: CPT

## 2019-01-19 PROCEDURE — 87070 CULTURE OTHR SPECIMN AEROBIC: CPT

## 2019-01-19 PROCEDURE — 87581 M.PNEUMON DNA AMP PROBE: CPT

## 2019-01-19 PROCEDURE — 87798 DETECT AGENT NOS DNA AMP: CPT

## 2019-01-19 PROCEDURE — 92610 EVALUATE SWALLOWING FUNCTION: CPT

## 2019-01-19 PROCEDURE — 80053 COMPREHEN METABOLIC PANEL: CPT

## 2019-01-19 PROCEDURE — 80162 ASSAY OF DIGOXIN TOTAL: CPT

## 2019-01-19 PROCEDURE — 87186 SC STD MICRODIL/AGAR DIL: CPT

## 2019-01-19 PROCEDURE — 71250 CT THORAX DX C-: CPT

## 2019-01-19 PROCEDURE — 84100 ASSAY OF PHOSPHORUS: CPT

## 2019-01-19 PROCEDURE — 96374 THER/PROPH/DIAG INJ IV PUSH: CPT

## 2019-01-19 PROCEDURE — 93005 ELECTROCARDIOGRAM TRACING: CPT

## 2019-01-19 PROCEDURE — 99233 SBSQ HOSP IP/OBS HIGH 50: CPT

## 2019-01-19 PROCEDURE — 97530 THERAPEUTIC ACTIVITIES: CPT

## 2019-01-19 PROCEDURE — 85027 COMPLETE CBC AUTOMATED: CPT

## 2019-01-19 PROCEDURE — 87040 BLOOD CULTURE FOR BACTERIA: CPT

## 2019-01-19 PROCEDURE — 99285 EMERGENCY DEPT VISIT HI MDM: CPT | Mod: 25

## 2019-01-19 PROCEDURE — 94640 AIRWAY INHALATION TREATMENT: CPT

## 2019-01-19 PROCEDURE — 97116 GAIT TRAINING THERAPY: CPT

## 2019-01-19 PROCEDURE — 81001 URINALYSIS AUTO W/SCOPE: CPT

## 2019-01-19 PROCEDURE — 87486 CHLMYD PNEUM DNA AMP PROBE: CPT

## 2019-01-19 PROCEDURE — 80048 BASIC METABOLIC PNL TOTAL CA: CPT

## 2019-01-19 PROCEDURE — 99232 SBSQ HOSP IP/OBS MODERATE 35: CPT | Mod: GC

## 2019-01-19 RX ORDER — METOCLOPRAMIDE HCL 10 MG
1 TABLET ORAL
Qty: 0 | Refills: 0 | DISCHARGE
Start: 2019-01-19

## 2019-01-19 RX ORDER — MONTELUKAST 4 MG/1
1 TABLET, CHEWABLE ORAL
Qty: 30 | Refills: 0
Start: 2019-01-19 | End: 2019-02-17

## 2019-01-19 RX ORDER — INSULIN LISPRO 100/ML
4 VIAL (ML) SUBCUTANEOUS
Qty: 0 | Refills: 0 | COMMUNITY
Start: 2019-01-19

## 2019-01-19 RX ORDER — INSULIN GLARGINE 100 [IU]/ML
10 INJECTION, SOLUTION SUBCUTANEOUS
Qty: 0 | Refills: 0 | DISCHARGE
Start: 2019-01-19

## 2019-01-19 RX ORDER — FLUTICASONE PROPIONATE 50 MCG
1 SPRAY, SUSPENSION NASAL
Qty: 1 | Refills: 0
Start: 2019-01-19 | End: 2019-02-17

## 2019-01-19 RX ORDER — DIGOXIN 250 MCG
1 TABLET ORAL
Qty: 0 | Refills: 0 | DISCHARGE
Start: 2019-01-19

## 2019-01-19 RX ORDER — INSULIN LISPRO 100/ML
5 VIAL (ML) SUBCUTANEOUS
Qty: 0 | Refills: 0 | COMMUNITY
Start: 2019-01-19

## 2019-01-19 RX ORDER — INSULIN GLARGINE 100 [IU]/ML
12 INJECTION, SOLUTION SUBCUTANEOUS
Qty: 0 | Refills: 0 | COMMUNITY
Start: 2019-01-19

## 2019-01-19 RX ORDER — INSULIN LISPRO 100/ML
5 VIAL (ML) SUBCUTANEOUS
Qty: 0 | Refills: 0 | DISCHARGE
Start: 2019-01-19

## 2019-01-19 RX ORDER — HYDROCORTISONE 1 %
1 OINTMENT (GRAM) TOPICAL ONCE
Qty: 0 | Refills: 0 | Status: DISCONTINUED | OUTPATIENT
Start: 2019-01-19 | End: 2019-01-19

## 2019-01-19 RX ADMIN — MONTELUKAST 10 MILLIGRAM(S): 4 TABLET, CHEWABLE ORAL at 12:35

## 2019-01-19 RX ADMIN — CHLORHEXIDINE GLUCONATE 1 APPLICATION(S): 213 SOLUTION TOPICAL at 12:36

## 2019-01-19 RX ADMIN — BUDESONIDE AND FORMOTEROL FUMARATE DIHYDRATE 2 PUFF(S): 160; 4.5 AEROSOL RESPIRATORY (INHALATION) at 08:46

## 2019-01-19 RX ADMIN — Medication 1 SPRAY(S): at 08:46

## 2019-01-19 RX ADMIN — APIXABAN 5 MILLIGRAM(S): 2.5 TABLET, FILM COATED ORAL at 06:38

## 2019-01-19 RX ADMIN — Medication 100 MILLIGRAM(S): at 12:35

## 2019-01-19 RX ADMIN — Medication 10 UNIT(S): at 08:45

## 2019-01-19 RX ADMIN — Medication 100 MILLIGRAM(S): at 06:38

## 2019-01-19 RX ADMIN — Medication 0.25 MILLIGRAM(S): at 06:38

## 2019-01-19 RX ADMIN — Medication 2: at 08:45

## 2019-01-19 RX ADMIN — Medication 600 MILLIGRAM(S): at 06:38

## 2019-01-19 RX ADMIN — Medication 3 MILLILITER(S): at 12:36

## 2019-01-19 RX ADMIN — Medication 3 MILLILITER(S): at 08:47

## 2019-01-19 RX ADMIN — PANTOPRAZOLE SODIUM 40 MILLIGRAM(S): 20 TABLET, DELAYED RELEASE ORAL at 06:38

## 2019-01-19 RX ADMIN — Medication 20 MILLIGRAM(S): at 06:39

## 2019-01-19 RX ADMIN — BENZOCAINE AND MENTHOL 1 LOZENGE: 5; 1 LIQUID ORAL at 06:39

## 2019-01-19 RX ADMIN — Medication 10 UNIT(S): at 12:35

## 2019-01-19 NOTE — PROGRESS NOTE ADULT - ATTENDING COMMENTS
69 y/o F w/asthma on chronic prednisone, tracheobronchomalacia s/p tracheo-bronchoplasty presenting with asthma exacerbation and hypoxemia secondary to coronavirus infection. Completed course of Cefepime and Azithromycine. Overall feeling better, is planned for discharge.     Acute hypoxemic respiratory failure:  Likely multifactorial-- has baseline severe asthma, TBM, bronchiectasis-and now corona virus. Completed course of IV antibiotics. Plan is for discharge home today.     - continue symbicort 160 2 bid, spiriva 1 q day, singulair 10 qhs, duoneb via hhn q 6.   - CPT by vest rx 4 x per day  - O2 2 liters NC to keep sat above 90%-weaned off to RA  - Aspiration precautions-all meals are to OOB as able  - incentive spirometry.   - DC planning can change to PO medrol 16 am and pm--taper as out pt (continue this dose for 3 days then recude by 8mg every 4 days to usual dosing of 4mg per day)
as above--better over all  Multifactorial Resp failure--severe persistent asthma, TBM, bronchiectasis, AF and now corona virus-now on RA  AF--mult rx  Asthma flair-solumedrol 20 q 8, symbicort, spiriva, singulair,duoneb via HHN q 6 ( tomorrow to PO medrol 32 if improvement continues)  Gerd--protonix, and reglan to continue  TBM-s/p tracheoplasty                              Bronchiectasis---vest rx/acapella  Corona virus---on empiric abx-cefepime/azith D5--sputum c and s, ID evaluation  DVT proph/PT consult                                           DC planning  Eulalio Allison MD-Pulmonary   [phone]
d/c home today after BM  time spent coordinating d/c 45 min
as above--better--passed sp and sw evaln and completed abx  Multifactorial Resp failure--severe persistent asthma, TBM, bronchiectasis, AF and now corona virus-now on RA  AF--mult rx  Asthma flair-solumedrol 20 q 12, symbicort, spiriva, singulair,duoneb via HHN q 6   Gerd--protonix, and reglan to continue---Sp and SW evaluation  TBM-s/p tracheoplasty                              Bronchiectasis---vest rx/acapella (needs improvement)  Corona virus---completed empiric abx-cefepime/azith D7--sputum c and s, ID evaluation  DVT proph/PT consult                                          *** DC planning-can change to PO medrol 16 am and pm--taper as out pt (continue this dose for 3 days then recude by 8mg every 4 days to usual dosing of 4mg per day.  Eulalio Allison MD-Pulmonary   [phone]
as above--stagnant--not ready for reduction in rx  Multifactorial Resp failure--severe persistent asthma, TBM, bronchiectasis, AF and now corona virus-now on RA  AF--mult rx  Asthma flair-solumedrol 20 q 8, symbicort, spiriva, singulair,duoneb via HHN q 6 ( tomorrow to PO medrol 32 if improved)  Gerd--protonix, and reglan to continue  TBM-s/p tracheoplasty                              Bronchiectasis---vest rx/acapella (needs improvement)  Corona virus---on empiric abx-cefepime/azith D6--sputum c and s, ID evaluation  DVT proph/PT consult                                           DC planning  Eulalio Allison MD-Pulmonary   [phone]
as above--stagnant--ready for reduction in rx--but needs sp and sw evaln.  Multifactorial Resp failure--severe persistent asthma, TBM, bronchiectasis, AF and now corona virus-now on RA  AF--mult rx  Asthma flair-solumedrol 20 q 8-reduce to q 12, symbicort, spiriva, singulair,duoneb via HHN q 6   Gerd--protonix, and reglan to continue---Sp and SW evaluation  TBM-s/p tracheoplasty                              Bronchiectasis---vest rx/acapella (needs improvement)  Corona virus---on empiric abx-cefepime/azith D7--sputum c and s, ID evaluation  DVT proph/PT consult                                           DC planning  Eulalio Allison MD-Pulmonary   [phone]
as above--  Multifactorial Resp failure--severe persistent asthma, TBM, bronchiectasis, AF and now corona virus-O 2 NC-sat above 90%  AF--mult r  Asthma flair-solumedrol 20 q 8, symbicort, spiriva, singulair,duoneb via HHN q 6  Gerd--protonix, and reglan to continue  TBM-s/p tracheoplasty                              Bronchiectasis---vest rx/acapella  Corona virus---on empiric abx-cefepime/azith D4--sputum c and s, ID evaluation  DVT proph/PT consult  Eulalio Allison MD-Pulmonary   [phone]

## 2019-01-19 NOTE — PROGRESS NOTE ADULT - SUBJECTIVE AND OBJECTIVE BOX
Pulmonary Progress Note     Interval Events:    SUBJECTIVE:    OBJECTIVE:  ICU Vital Signs Last 24 Hrs  T(C): 36.7 (19 Jan 2019 12:03), Max: 36.9 (18 Jan 2019 21:20)  T(F): 98.1 (19 Jan 2019 12:03), Max: 98.4 (18 Jan 2019 21:20)  HR: 79 (19 Jan 2019 12:03) (69 - 79)  BP: 107/64 (19 Jan 2019 12:03) (105/61 - 108/63)  BP(mean): --  ABP: --  ABP(mean): --  RR: 18 (19 Jan 2019 12:03) (18 - 18)  SpO2: 95% (19 Jan 2019 12:03) (95% - 98%)        01-18 @ 07:01 - 01-19 @ 07:00  --------------------------------------------------------  IN: 720 mL / OUT: 1702 mL / NET: -982 mL    01-19 @ 07:01  - 01-19 @ 14:20  --------------------------------------------------------  IN: 120 mL / OUT: 1 mL / NET: 119 mL      CAPILLARY BLOOD GLUCOSE      POCT Blood Glucose.: 139 mg/dL (19 Jan 2019 11:48)      PHYSICAL EXAM:  GENERAL: NAD, well-groomed, well-developed  HEAD:  Atraumatic, Normocephalic  EYES: EOMI, PERRLA, conjunctiva and sclera clear  ENMT: No tonsillar erythema, exudates, or enlargement; Moist mucous membranes, Good dentition, No lesions  NECK: Supple, No JVD, Normal thyroid  NERVOUS SYSTEM:  Alert & Oriented X3, Good concentration; Motor Strength 5/5 B/L upper and lower extremities; DTRs 2+ intact and symmetric  CHEST/LUNG: Clear to percussion bilaterally; No rales, rhonchi, wheezing, or rubs  HEART: Regular rate and rhythm; No murmurs, rubs, or gallops  ABDOMEN: Soft, Nontender, Nondistended; Bowel sounds present  EXTREMITIES:  2+ Peripheral Pulses, No clubbing, cyanosis, or edema  LYMPH: No lymphadenopathy noted  SKIN: No rashes or lesions    HOSPITAL MEDICATIONS:  MEDICATIONS  (STANDING):  ALBUTerol/ipratropium for Nebulization 3 milliLiter(s) Nebulizer every 6 hours  apixaban 5 milliGRAM(s) Oral every 12 hours  bisacodyl 5 milliGRAM(s) Oral at bedtime  buDESOnide 160 MICROgram(s)/formoterol 4.5 MICROgram(s) Inhaler 2 Puff(s) Inhalation two times a day  chlorhexidine 4% Liquid 1 Application(s) Topical <User Schedule>  dextrose 5%. 1000 milliLiter(s) (50 mL/Hr) IV Continuous <Continuous>  dextrose 50% Injectable 12.5 Gram(s) IV Push once  dextrose 50% Injectable 25 Gram(s) IV Push once  digoxin     Tablet 0.25 milliGRAM(s) Oral daily  docusate sodium 100 milliGRAM(s) Oral three times a day  fluticasone propionate 50 MICROgram(s)/spray Nasal Spray 1 Spray(s) Both Nostrils two times a day  guaiFENesin  milliGRAM(s) Oral every 12 hours  hydrocortisone 0.5% Cream 1 Application(s) Topical once  influenza   Vaccine 0.5 milliLiter(s) IntraMuscular once  insulin glargine Injectable (LANTUS) 14 Unit(s) SubCutaneous at bedtime  insulin lispro (HumaLOG) corrective regimen sliding scale.   SubCutaneous three times a day before meals  insulin lispro (HumaLOG) corrective regimen sliding scale.   SubCutaneous at bedtime  insulin lispro Injectable (HumaLOG) 10 Unit(s) SubCutaneous three times a day before meals  montelukast 10 milliGRAM(s) Oral daily  pantoprazole    Tablet 40 milliGRAM(s) Oral before breakfast  senna 2 Tablet(s) Oral at bedtime  sodium chloride 0.9%. 1000 milliLiter(s) (75 mL/Hr) IV Continuous <Continuous>    MEDICATIONS  (PRN):  ALPRAZolam 0.25 milliGRAM(s) Oral at bedtime PRN insomnia  benzocaine 15 mG/menthol 3.6 mG (Sugar-Free) Lozenge 1 Lozenge Oral every 2 hours PRN Sore Throat  dextrose 40% Gel 15 Gram(s) Oral once PRN Blood Glucose LESS THAN 70 milliGRAM(s)/deciliter  glucagon  Injectable 1 milliGRAM(s) IntraMuscular once PRN Glucose LESS THAN 70 milligrams/deciliter  metoclopramide 5 milliGRAM(s) Oral three times a day PRN Nausea      LABS:                        11.0   9.47  )-----------( 308      ( 18 Jan 2019 07:14 )             35.6     Hgb Trend: 11.0<--, 11.1<--, 11.3<--, 10.9<--, 10.7<--  01-18    140  |  102  |  16  ----------------------------<  196<H>  4.1   |  29  |  0.65    Ca    8.6      18 Jan 2019 06:03      Creatinine Trend: 0.65<--, 0.58<--, 0.57<--, 0.57<--, 0.63<--, 0.56<--    MICROBIOLOGY:   Culture - Sputum (collected 18 Jan 2019 08:21)  Source: .Sputum Sputum CUP  Gram Stain (18 Jan 2019 11:51):    Few polymorphonuclear leukocytes per low power field    Few Squamous epithelial cells per low power field    Few Gram Positive Cocci in Pairs and Chains per oil power field    Rare Yeast like cells per oil power field  Preliminary Report (19 Jan 2019 10:50):    Few Pseudomonas fluorescens    Normal val isolated        RADIOLOGY:  [x] Reviewed and interpreted by me    PULMONARY FUNCTION TESTS: Pulmonary Progress Note     Interval Events: no acute events    SUBJECTIVE: feeling better    OBJECTIVE:  ICU Vital Signs Last 24 Hrs  T(C): 36.7 (19 Jan 2019 12:03), Max: 36.9 (18 Jan 2019 21:20)  T(F): 98.1 (19 Jan 2019 12:03), Max: 98.4 (18 Jan 2019 21:20)  HR: 79 (19 Jan 2019 12:03) (69 - 79)  BP: 107/64 (19 Jan 2019 12:03) (105/61 - 108/63)  RR: 18 (19 Jan 2019 12:03) (18 - 18)  SpO2: 95% (19 Jan 2019 12:03) (95% - 98%)    01-18 @ 07:01  -  01-19 @ 07:00  --------------------------------------------------------  IN: 720 mL / OUT: 1702 mL / NET: -982 mL    01-19 @ 07:01 - 01-19 @ 14:20  --------------------------------------------------------  IN: 120 mL / OUT: 1 mL / NET: 119 mL    CAPILLARY BLOOD GLUCOSE  POCT Blood Glucose.: 139 mg/dL (19 Jan 2019 11:48)    PHYSICAL EXAM:  GENERAL: NAD, well-groomed, well-developed  HEAD:  Atraumatic, Normocephalic  EYES: EOMI,conjunctiva and sclera clear  NERVOUS SYSTEM:  Alert & Oriented X3, Good concentration; Moves all extremities  CHEST/LUNG: Coarse breath sounds. No wheezing.   HEART: Regular rate and rhythm; No murmurs, rubs, or gallops  ABDOMEN: Soft, Nontender, Nondistended; Bowel sounds present  EXTREMITIES:  2+ Peripheral Pulses, No clubbing, cyanosis, or edema  LYMPH: No lymphadenopathy noted  SKIN: No rashes or lesions    HOSPITAL MEDICATIONS:  MEDICATIONS  (STANDING):  ALBUTerol/ipratropium for Nebulization 3 milliLiter(s) Nebulizer every 6 hours  apixaban 5 milliGRAM(s) Oral every 12 hours  bisacodyl 5 milliGRAM(s) Oral at bedtime  buDESOnide 160 MICROgram(s)/formoterol 4.5 MICROgram(s) Inhaler 2 Puff(s) Inhalation two times a day  chlorhexidine 4% Liquid 1 Application(s) Topical <User Schedule>  dextrose 5%. 1000 milliLiter(s) (50 mL/Hr) IV Continuous <Continuous>  dextrose 50% Injectable 12.5 Gram(s) IV Push once  dextrose 50% Injectable 25 Gram(s) IV Push once  digoxin     Tablet 0.25 milliGRAM(s) Oral daily  docusate sodium 100 milliGRAM(s) Oral three times a day  fluticasone propionate 50 MICROgram(s)/spray Nasal Spray 1 Spray(s) Both Nostrils two times a day  guaiFENesin  milliGRAM(s) Oral every 12 hours  hydrocortisone 0.5% Cream 1 Application(s) Topical once  influenza   Vaccine 0.5 milliLiter(s) IntraMuscular once  insulin glargine Injectable (LANTUS) 14 Unit(s) SubCutaneous at bedtime  insulin lispro (HumaLOG) corrective regimen sliding scale.   SubCutaneous three times a day before meals  insulin lispro (HumaLOG) corrective regimen sliding scale.   SubCutaneous at bedtime  insulin lispro Injectable (HumaLOG) 10 Unit(s) SubCutaneous three times a day before meals  montelukast 10 milliGRAM(s) Oral daily  pantoprazole    Tablet 40 milliGRAM(s) Oral before breakfast  senna 2 Tablet(s) Oral at bedtime  sodium chloride 0.9%. 1000 milliLiter(s) (75 mL/Hr) IV Continuous <Continuous>    MEDICATIONS  (PRN):  ALPRAZolam 0.25 milliGRAM(s) Oral at bedtime PRN insomnia  benzocaine 15 mG/menthol 3.6 mG (Sugar-Free) Lozenge 1 Lozenge Oral every 2 hours PRN Sore Throat  dextrose 40% Gel 15 Gram(s) Oral once PRN Blood Glucose LESS THAN 70 milliGRAM(s)/deciliter  glucagon  Injectable 1 milliGRAM(s) IntraMuscular once PRN Glucose LESS THAN 70 milligrams/deciliter  metoclopramide 5 milliGRAM(s) Oral three times a day PRN Nausea      LABS:                        11.0   9.47  )-----------( 308      ( 18 Jan 2019 07:14 )             35.6     Hgb Trend: 11.0<--, 11.1<--, 11.3<--, 10.9<--, 10.7<--  01-18    140  |  102  |  16  ----------------------------<  196<H>  4.1   |  29  |  0.65    Ca    8.6      18 Jan 2019 06:03      Creatinine Trend: 0.65<--, 0.58<--, 0.57<--, 0.57<--, 0.63<--, 0.56<--    MICROBIOLOGY:   Culture - Sputum (collected 18 Jan 2019 08:21)  Source: .Sputum Sputum CUP  Gram Stain (18 Jan 2019 11:51):    Few polymorphonuclear leukocytes per low power field    Few Squamous epithelial cells per low power field    Few Gram Positive Cocci in Pairs and Chains per oil power field    Rare Yeast like cells per oil power field  Preliminary Report (19 Jan 2019 10:50):    Few Pseudomonas fluorescens    Normal val isolated    RADIOLOGY:  [x] Reviewed and interpreted by me

## 2019-01-19 NOTE — PROGRESS NOTE ADULT - REASON FOR ADMISSION
Cough, Fevers, Shortness of breath

## 2019-01-19 NOTE — PROGRESS NOTE ADULT - PROVIDER SPECIALTY LIST ADULT
Hospitalist
Pulmonology

## 2019-01-19 NOTE — PROGRESS NOTE ADULT - ASSESSMENT
- 69 y/o F w/asthma on chronic prednisone, tracheobronchomalacia s/p tracheo-bronchoplasty presenting with asthma exacerbation and hypoxemia secondary to coronavirus infection. Completed course of Cefepime and Azithromycine. Overall feeling better, is planned for discharge.     Acute hypoxemic respiratory failure:  Likely multifactorial-- has baseline severe asthma, TBM, bronchiectasis-and now corona virus. Completed course of IV antibiotics. Plan is for discharge home today.     - continue symbicort 160 2 bid, spiriva 1 q day, singulair 10 qhs, duoneb via hhn q 6.   - CPT by vest rx 4 x per day  - O2 2 liters NC to keep sat above 90%-weaned off to RA  - Aspiration precautions-all meals are to OOB as able  - incentive spirometry.   - DC planning can change to PO medrol 16 am and pm--taper as out pt (continue this dose for 3 days then recude by 8mg every 4 days to usual dosing of 4mg per day)    Jami Angelo MD  Pulmonary & Critical Care Medicine Fellow | PGY-4  [phone] 69 y/o F w/asthma on chronic prednisone, tracheobronchomalacia s/p tracheo-bronchoplasty presenting with asthma exacerbation and hypoxemia secondary to coronavirus infection. Completed course of Cefepime and Azithromycine. Overall feeling better, is planned for discharge.     Acute hypoxemic respiratory failure:  Likely multifactorial-- has baseline severe asthma, TBM, bronchiectasis-and now corona virus. Completed course of IV antibiotics. Plan is for discharge home today.     - continue symbicort 160 2 bid, spiriva 1 q day, singulair 10 qhs, duoneb via hhn q 6.   - CPT by vest rx 4 x per day  - O2 2 liters NC to keep sat above 90%-weaned off to RA  - Aspiration precautions-all meals are to OOB as able  - incentive spirometry.   - DC planning can change to PO medrol 16 am and pm--taper as out pt (continue this dose for 3 days then recude by 8mg every 4 days to usual dosing of 4mg per day)    Jami Angelo MD  Pulmonary & Critical Care Medicine Fellow | PGY-4  [phone]

## 2019-01-20 LAB
-  AMIKACIN: SIGNIFICANT CHANGE UP
-  AZTREONAM: SIGNIFICANT CHANGE UP
-  CEFEPIME: SIGNIFICANT CHANGE UP
-  CEFTRIAXONE: SIGNIFICANT CHANGE UP
-  CIPROFLOXACIN: SIGNIFICANT CHANGE UP
-  GENTAMICIN: SIGNIFICANT CHANGE UP
-  LEVOFLOXACIN: SIGNIFICANT CHANGE UP
-  MEROPENEM: SIGNIFICANT CHANGE UP
-  PIPERACILLIN/TAZOBACTAM: SIGNIFICANT CHANGE UP
-  TOBRAMYCIN: SIGNIFICANT CHANGE UP
-  TRIMETHOPRIM/SULFAMETHOXAZOLE: SIGNIFICANT CHANGE UP
CULTURE RESULTS: SIGNIFICANT CHANGE UP
METHOD TYPE: SIGNIFICANT CHANGE UP
ORGANISM # SPEC MICROSCOPIC CNT: SIGNIFICANT CHANGE UP
ORGANISM # SPEC MICROSCOPIC CNT: SIGNIFICANT CHANGE UP
SPECIMEN SOURCE: SIGNIFICANT CHANGE UP

## 2019-01-24 ENCOUNTER — APPOINTMENT (OUTPATIENT)
Dept: THORACIC SURGERY | Facility: CLINIC | Age: 69
End: 2019-01-24

## 2019-01-29 ENCOUNTER — NON-APPOINTMENT (OUTPATIENT)
Age: 69
End: 2019-01-29

## 2019-01-29 ENCOUNTER — INPATIENT (INPATIENT)
Facility: HOSPITAL | Age: 69
LOS: 7 days | Discharge: HOME | DRG: 872 | End: 2019-02-06
Attending: INTERNAL MEDICINE | Admitting: HOSPITALIST
Payer: MEDICARE

## 2019-01-29 ENCOUNTER — APPOINTMENT (OUTPATIENT)
Dept: CARDIOTHORACIC SURGERY | Facility: CLINIC | Age: 69
End: 2019-01-29
Payer: MEDICARE

## 2019-01-29 VITALS
WEIGHT: 143.08 LBS | HEART RATE: 131 BPM | SYSTOLIC BLOOD PRESSURE: 126 MMHG | DIASTOLIC BLOOD PRESSURE: 81 MMHG | OXYGEN SATURATION: 95 % | HEIGHT: 67.5 IN | TEMPERATURE: 102 F | RESPIRATION RATE: 18 BRPM

## 2019-01-29 VITALS
HEART RATE: 125 BPM | WEIGHT: 143 LBS | RESPIRATION RATE: 14 BRPM | TEMPERATURE: 100.3 F | DIASTOLIC BLOOD PRESSURE: 79 MMHG | BODY MASS INDEX: 22.44 KG/M2 | SYSTOLIC BLOOD PRESSURE: 132 MMHG | OXYGEN SATURATION: 93 % | HEIGHT: 67 IN

## 2019-01-29 DIAGNOSIS — Z98.89 OTHER SPECIFIED POSTPROCEDURAL STATES: Chronic | ICD-10-CM

## 2019-01-29 DIAGNOSIS — Z98.890 OTHER SPECIFIED POSTPROCEDURAL STATES: Chronic | ICD-10-CM

## 2019-01-29 DIAGNOSIS — Z96.653 PRESENCE OF ARTIFICIAL KNEE JOINT, BILATERAL: Chronic | ICD-10-CM

## 2019-01-29 DIAGNOSIS — H05.352 EXOSTOSIS OF LEFT ORBIT: Chronic | ICD-10-CM

## 2019-01-29 DIAGNOSIS — Z87.09 PERSONAL HISTORY OF OTHER DISEASES OF THE RESPIRATORY SYSTEM: Chronic | ICD-10-CM

## 2019-01-29 DIAGNOSIS — J18.9 PNEUMONIA, UNSPECIFIED ORGANISM: ICD-10-CM

## 2019-01-29 DIAGNOSIS — K62.5 HEMORRHAGE OF ANUS AND RECTUM: Chronic | ICD-10-CM

## 2019-01-29 DIAGNOSIS — R11.0 NAUSEA: ICD-10-CM

## 2019-01-29 LAB
APPEARANCE UR: CLEAR — SIGNIFICANT CHANGE UP
BACTERIA # UR AUTO: NEGATIVE — SIGNIFICANT CHANGE UP
BILIRUB UR-MCNC: NEGATIVE — SIGNIFICANT CHANGE UP
COLOR SPEC: YELLOW — SIGNIFICANT CHANGE UP
DIFF PNL FLD: ABNORMAL
EPI CELLS # UR: 1 /HPF — SIGNIFICANT CHANGE UP
GLUCOSE UR QL: ABNORMAL
HYALINE CASTS # UR AUTO: 0 /LPF — SIGNIFICANT CHANGE UP (ref 0–2)
KETONES UR-MCNC: NEGATIVE — SIGNIFICANT CHANGE UP
LEUKOCYTE ESTERASE UR-ACNC: NEGATIVE — SIGNIFICANT CHANGE UP
NITRITE UR-MCNC: NEGATIVE — SIGNIFICANT CHANGE UP
PH UR: 6 — SIGNIFICANT CHANGE UP (ref 5–8)
PROT UR-MCNC: ABNORMAL
RBC CASTS # UR COMP ASSIST: 8 /HPF — HIGH (ref 0–4)
SP GR SPEC: 1.02 — SIGNIFICANT CHANGE UP (ref 1.01–1.02)
UROBILINOGEN FLD QL: NEGATIVE — SIGNIFICANT CHANGE UP
WBC UR QL: 2 /HPF — SIGNIFICANT CHANGE UP (ref 0–5)

## 2019-01-29 PROCEDURE — 71250 CT THORAX DX C-: CPT | Mod: 26

## 2019-01-29 PROCEDURE — 74176 CT ABD & PELVIS W/O CONTRAST: CPT | Mod: 26

## 2019-01-29 PROCEDURE — 99223 1ST HOSP IP/OBS HIGH 75: CPT

## 2019-01-29 PROCEDURE — 99285 EMERGENCY DEPT VISIT HI MDM: CPT | Mod: GC,25

## 2019-01-29 PROCEDURE — 93000 ELECTROCARDIOGRAM COMPLETE: CPT | Mod: PD

## 2019-01-29 PROCEDURE — 99232 SBSQ HOSP IP/OBS MODERATE 35: CPT

## 2019-01-29 PROCEDURE — 71045 X-RAY EXAM CHEST 1 VIEW: CPT | Mod: 26

## 2019-01-29 PROCEDURE — 99213 OFFICE O/P EST LOW 20 MIN: CPT | Mod: PD

## 2019-01-29 PROCEDURE — 93010 ELECTROCARDIOGRAM REPORT: CPT | Mod: GC

## 2019-01-29 RX ORDER — BLOOD-GLUCOSE METER
W/DEVICE EACH MISCELLANEOUS
Qty: 1 | Refills: 0 | Status: DISCONTINUED | COMMUNITY
Start: 2018-10-01 | End: 2019-01-29

## 2019-01-29 RX ORDER — APIXABAN 2.5 MG/1
5 TABLET, FILM COATED ORAL EVERY 12 HOURS
Qty: 0 | Refills: 0 | Status: DISCONTINUED | OUTPATIENT
Start: 2019-01-29 | End: 2019-02-06

## 2019-01-29 RX ORDER — INSULIN GLARGINE 100 [IU]/ML
10 INJECTION, SOLUTION SUBCUTANEOUS AT BEDTIME
Qty: 0 | Refills: 0 | Status: DISCONTINUED | OUTPATIENT
Start: 2019-01-29 | End: 2019-01-30

## 2019-01-29 RX ORDER — DEXTROSE 50 % IN WATER 50 %
15 SYRINGE (ML) INTRAVENOUS ONCE
Qty: 0 | Refills: 0 | Status: DISCONTINUED | OUTPATIENT
Start: 2019-01-29 | End: 2019-02-06

## 2019-01-29 RX ORDER — DOCUSATE SODIUM 100 MG
100 CAPSULE ORAL THREE TIMES A DAY
Qty: 0 | Refills: 0 | Status: DISCONTINUED | OUTPATIENT
Start: 2019-01-29 | End: 2019-02-06

## 2019-01-29 RX ORDER — DEXTROSE 50 % IN WATER 50 %
25 SYRINGE (ML) INTRAVENOUS ONCE
Qty: 0 | Refills: 0 | Status: DISCONTINUED | OUTPATIENT
Start: 2019-01-29 | End: 2019-02-06

## 2019-01-29 RX ORDER — METHYLPREDNISOLONE 8 MG/1
8 TABLET ORAL
Qty: 100 | Refills: 0 | Status: DISCONTINUED | COMMUNITY
Start: 2018-12-20 | End: 2019-01-29

## 2019-01-29 RX ORDER — VANCOMYCIN HCL 1 G
1000 VIAL (EA) INTRAVENOUS ONCE
Qty: 0 | Refills: 0 | Status: COMPLETED | OUTPATIENT
Start: 2019-01-29 | End: 2019-01-29

## 2019-01-29 RX ORDER — DIGOXIN 250 MCG
0.25 TABLET ORAL DAILY
Qty: 0 | Refills: 0 | Status: DISCONTINUED | OUTPATIENT
Start: 2019-01-29 | End: 2019-02-06

## 2019-01-29 RX ORDER — POLYETHYLENE GLYCOL 3350 17 G/17G
17 POWDER, FOR SOLUTION ORAL
Qty: 238 | Refills: 0 | Status: DISCONTINUED | COMMUNITY
Start: 2018-07-12 | End: 2019-01-29

## 2019-01-29 RX ORDER — POLYETHYLENE GLYCOL 3350 17 G/17G
17 POWDER, FOR SOLUTION ORAL EVERY 12 HOURS
Qty: 0 | Refills: 0 | Status: DISCONTINUED | OUTPATIENT
Start: 2019-01-29 | End: 2019-02-06

## 2019-01-29 RX ORDER — AZITHROMYCIN 500 MG/1
500 TABLET, FILM COATED ORAL EVERY 24 HOURS
Qty: 0 | Refills: 0 | Status: DISCONTINUED | OUTPATIENT
Start: 2019-01-30 | End: 2019-01-31

## 2019-01-29 RX ORDER — INSULIN LISPRO 100/ML
VIAL (ML) SUBCUTANEOUS AT BEDTIME
Qty: 0 | Refills: 0 | Status: DISCONTINUED | OUTPATIENT
Start: 2019-01-29 | End: 2019-01-30

## 2019-01-29 RX ORDER — DEXTROSE 50 % IN WATER 50 %
12.5 SYRINGE (ML) INTRAVENOUS ONCE
Qty: 0 | Refills: 0 | Status: DISCONTINUED | OUTPATIENT
Start: 2019-01-29 | End: 2019-02-06

## 2019-01-29 RX ORDER — GLUCAGON INJECTION, SOLUTION 0.5 MG/.1ML
1 INJECTION, SOLUTION SUBCUTANEOUS ONCE
Qty: 0 | Refills: 0 | Status: DISCONTINUED | OUTPATIENT
Start: 2019-01-29 | End: 2019-02-06

## 2019-01-29 RX ORDER — BLOOD SUGAR DIAGNOSTIC
STRIP MISCELLANEOUS TWICE DAILY
Qty: 180 | Refills: 3 | Status: DISCONTINUED | COMMUNITY
Start: 2018-10-01 | End: 2019-01-29

## 2019-01-29 RX ORDER — SIMETHICONE 80 MG/1
80 TABLET, CHEWABLE ORAL
Qty: 0 | Refills: 0 | Status: DISCONTINUED | OUTPATIENT
Start: 2019-01-29 | End: 2019-02-06

## 2019-01-29 RX ORDER — BUDESONIDE AND FORMOTEROL FUMARATE DIHYDRATE 160; 4.5 UG/1; UG/1
2 AEROSOL RESPIRATORY (INHALATION)
Qty: 0 | Refills: 0 | Status: DISCONTINUED | OUTPATIENT
Start: 2019-01-29 | End: 2019-02-06

## 2019-01-29 RX ORDER — PANTOPRAZOLE SODIUM 20 MG/1
40 TABLET, DELAYED RELEASE ORAL
Qty: 0 | Refills: 0 | Status: DISCONTINUED | OUTPATIENT
Start: 2019-01-29 | End: 2019-02-06

## 2019-01-29 RX ORDER — CEFEPIME 1 G/1
2000 INJECTION, POWDER, FOR SOLUTION INTRAMUSCULAR; INTRAVENOUS ONCE
Qty: 0 | Refills: 0 | Status: COMPLETED | OUTPATIENT
Start: 2019-01-29 | End: 2019-01-29

## 2019-01-29 RX ORDER — METHYLPREDNISOLONE 8 MG/1
8 TABLET ORAL
Qty: 100 | Refills: 0 | Status: DISCONTINUED | COMMUNITY
Start: 2018-11-21 | End: 2019-01-29

## 2019-01-29 RX ORDER — SODIUM CHLORIDE 9 MG/ML
1000 INJECTION INTRAMUSCULAR; INTRAVENOUS; SUBCUTANEOUS ONCE
Qty: 0 | Refills: 0 | Status: COMPLETED | OUTPATIENT
Start: 2019-01-29 | End: 2019-01-29

## 2019-01-29 RX ORDER — AZITHROMYCIN 500 MG/1
500 TABLET, FILM COATED ORAL ONCE
Qty: 0 | Refills: 0 | Status: COMPLETED | OUTPATIENT
Start: 2019-01-29 | End: 2019-01-29

## 2019-01-29 RX ORDER — IPRATROPIUM/ALBUTEROL SULFATE 18-103MCG
3 AEROSOL WITH ADAPTER (GRAM) INHALATION EVERY 6 HOURS
Qty: 0 | Refills: 0 | Status: DISCONTINUED | OUTPATIENT
Start: 2019-01-29 | End: 2019-02-06

## 2019-01-29 RX ORDER — SODIUM CHLORIDE 9 MG/ML
1000 INJECTION, SOLUTION INTRAVENOUS
Qty: 0 | Refills: 0 | Status: DISCONTINUED | OUTPATIENT
Start: 2019-01-29 | End: 2019-02-06

## 2019-01-29 RX ORDER — CEFEPIME 1 G/1
2000 INJECTION, POWDER, FOR SOLUTION INTRAMUSCULAR; INTRAVENOUS EVERY 8 HOURS
Qty: 0 | Refills: 0 | Status: DISCONTINUED | OUTPATIENT
Start: 2019-01-29 | End: 2019-02-05

## 2019-01-29 RX ORDER — MONTELUKAST 4 MG/1
10 TABLET, CHEWABLE ORAL DAILY
Qty: 0 | Refills: 0 | Status: DISCONTINUED | OUTPATIENT
Start: 2019-01-29 | End: 2019-02-06

## 2019-01-29 RX ORDER — VANCOMYCIN HCL 1 G
1000 VIAL (EA) INTRAVENOUS EVERY 12 HOURS
Qty: 0 | Refills: 0 | Status: DISCONTINUED | OUTPATIENT
Start: 2019-01-29 | End: 2019-01-30

## 2019-01-29 RX ORDER — SENNA PLUS 8.6 MG/1
2 TABLET ORAL AT BEDTIME
Qty: 0 | Refills: 0 | Status: DISCONTINUED | OUTPATIENT
Start: 2019-01-29 | End: 2019-02-06

## 2019-01-29 RX ORDER — FLUTICASONE PROPIONATE 50 MCG
1 SPRAY, SUSPENSION NASAL
Qty: 0 | Refills: 0 | Status: DISCONTINUED | OUTPATIENT
Start: 2019-01-29 | End: 2019-02-06

## 2019-01-29 RX ORDER — METOCLOPRAMIDE HCL 10 MG
5 TABLET ORAL THREE TIMES A DAY
Qty: 0 | Refills: 0 | Status: DISCONTINUED | OUTPATIENT
Start: 2019-01-29 | End: 2019-02-06

## 2019-01-29 RX ORDER — INSULIN LISPRO 100/ML
5 VIAL (ML) SUBCUTANEOUS
Qty: 0 | Refills: 0 | Status: DISCONTINUED | OUTPATIENT
Start: 2019-01-29 | End: 2019-01-31

## 2019-01-29 RX ORDER — ACETAMINOPHEN 500 MG
975 TABLET ORAL EVERY 6 HOURS
Qty: 0 | Refills: 0 | Status: DISCONTINUED | OUTPATIENT
Start: 2019-01-29 | End: 2019-02-06

## 2019-01-29 RX ORDER — ACETAMINOPHEN 500 MG
650 TABLET ORAL ONCE
Qty: 0 | Refills: 0 | Status: COMPLETED | OUTPATIENT
Start: 2019-01-29 | End: 2019-01-29

## 2019-01-29 RX ORDER — TRAMADOL HYDROCHLORIDE 50 MG/1
25 TABLET ORAL EVERY 4 HOURS
Qty: 0 | Refills: 0 | Status: DISCONTINUED | OUTPATIENT
Start: 2019-01-29 | End: 2019-01-29

## 2019-01-29 RX ORDER — TIOTROPIUM BROMIDE 18 UG/1
1 CAPSULE ORAL; RESPIRATORY (INHALATION) DAILY
Qty: 0 | Refills: 0 | Status: DISCONTINUED | OUTPATIENT
Start: 2019-01-29 | End: 2019-02-06

## 2019-01-29 RX ORDER — INSULIN LISPRO 100/ML
VIAL (ML) SUBCUTANEOUS
Qty: 0 | Refills: 0 | Status: DISCONTINUED | OUTPATIENT
Start: 2019-01-29 | End: 2019-01-30

## 2019-01-29 RX ADMIN — AZITHROMYCIN 250 MILLIGRAM(S): 500 TABLET, FILM COATED ORAL at 21:02

## 2019-01-29 RX ADMIN — CEFEPIME 100 MILLIGRAM(S): 1 INJECTION, POWDER, FOR SOLUTION INTRAMUSCULAR; INTRAVENOUS at 17:08

## 2019-01-29 RX ADMIN — SODIUM CHLORIDE 1000 MILLILITER(S): 9 INJECTION INTRAMUSCULAR; INTRAVENOUS; SUBCUTANEOUS at 17:08

## 2019-01-29 RX ADMIN — Medication 650 MILLIGRAM(S): at 17:08

## 2019-01-29 RX ADMIN — Medication 62.5 MILLIGRAM(S): at 17:07

## 2019-01-29 RX ADMIN — Medication 1000 MILLIGRAM(S): at 21:00

## 2019-01-29 RX ADMIN — Medication 250 MILLIGRAM(S): at 18:40

## 2019-01-29 NOTE — ED PROVIDER NOTE - OBJECTIVE STATEMENT
67 yo F with pmhx of Colon Cx s/p colectomy, CKD, asthma, afib, COPD, Diabetes, tracheomalacia, with recent admission for COPD 2/2 to coronavirus w/ RML/RLL opacities and tx for CAP, presents with worsening fever and cough x 1 day. Pt had presented to Dr Leahy's office for clearance for physical therapy and was found to be febrile to 102F with tachycardia. Per pt she has been having a worsening cough over the last few 1-2 days, no chest pain. Has + sob. Also notes some dysuria. No change in ostomy outpt. No dizziness. Notes sputum color is now yellow and thick. Also had an episode of NBNB vomiting in her doctors office today.

## 2019-01-29 NOTE — H&P ADULT - PROBLEM SELECTOR PLAN 2
--febrile, tachycardic, increased WBC (had normalized at discharge while on steroids 1.5 weeks ago)  --source likely pulmonary vs urinary. Continue with broad spectrum antibiotics to cover both.   --now fluid resuscitated. VS much improved.   --mildly elevated lactate, will recheck with next labs.   --UA appears negative, but somewhat symptomatic  --increased sputum production, remains coronavirus positive with tree-in-bud findings on CT chest. CT A/P without any new concerning findings.   --follow up BCx and UCx.   --tylenol for fever.

## 2019-01-29 NOTE — ED PROVIDER NOTE - PHYSICAL EXAMINATION
Gen: AAOx3, NAD. mild tachypnea but speaking in full sentences   Head: NCAT  ENT: Airway patent, moist mucous membranes, nasal passageways clear, no pharyngeal erythema or exudates, uvula midline, no cervical lymphadenopathy  Cardiac: Normal rate, normal rhythm, no murmurs/rubs/gallops appreciated  Respiratory: ronchorous breath sounds bilaterally   Gastrointestinal: +BS, Abdomen soft, mild TTP RLQ and suprapubic area, nondistended, no rebound, ostomy w/ brownish yellow stool.   MSK: No gross abnormalities, FROM of all four extremities, no edema  HEME: Extremities warm, pulses intact and symmetrical in all four extremities  Skin: No rashes, no lesions  Neuro: No gross neurologic deficits

## 2019-01-29 NOTE — H&P ADULT - NSHPPHYSICALEXAM_GEN_ALL_CORE
Vital Signs Last 24 Hrs  T(C): 36.3 (01-29-19 @ 23:48)  T(F): 97.4 (01-29-19 @ 23:48), Max: 102.4 (01-29-19 @ 15:23)  HR: 80 (01-29-19 @ 23:48) (80 - 131)  BP: 102/61 (01-29-19 @ 23:48)  BP(mean): --  RR: 17 (01-29-19 @ 23:48) (17 - 18)  SpO2: 98% (01-29-19 @ 23:48) (94% - 98%)  Wt(kg): --    CAPILLARY BLOOD GLUCOSE  POCT Blood Glucose.: 387 mg/dL (29 Jan 2019 21:46)    PHYSICAL EXAM:  GENERAL: NAD, well-developed, coughing, bringing up thick, white sputum, but not in respiratory distress.   HEAD:  Atraumatic, Normocephalic  EYES: EOMI, PERRLA, conjunctiva and sclera clear  NECK: Supple, No JVD  CHEST/LUNG: course breath sounds upper lung fields, mild expiratory wheezing mid-lung fields   HEART: Regular rate and rhythm; No murmurs, rubs, or gallops  ABDOMEN: +colostomy intact without signs of surrounding infection; Soft, mildly distended, minimally tender lower quadrants; Bowel sounds present  EXTREMITIES:  2+ Peripheral Pulses, No clubbing, cyanosis, or edema  PSYCH: AAOx3  NEUROLOGY: non-focal  SKIN: No rashes or lesions

## 2019-01-29 NOTE — ED PROVIDER NOTE - PROGRESS NOTE DETAILS
< late entry> Kwan PGY2: pt re-evaluated prior to change of shift, coughing with mucous production, lungs same as earlier, no labored respirations, no acute complaints, no sob, just cough. pt accepted for admission. initial ekg was sinus tach with left axis deviation with ischemic changes consistent w/ prior ekgs on file < late entry> Kwan PGY2: pt re-evaluated prior to change of shift, coughing with mucous production, lungs same as earlier, no labored respirations, no acute complaints, no sob, just cough. pt accepted for admission. initial ekg was sinus tach with left axis deviation without ischemic changes consistent w/ prior ekgs on file

## 2019-01-29 NOTE — H&P ADULT - ASSESSMENT
68F PMH severe asthma/COPD, tracheomalacia s/p tracheo-bronchoplasty (2016), anal SCC s/p chemo/RT/proctectomy with colostomy, adrenal insufficiency on chronic medrol, CKD, afib on apixaban, T2DM, remote DVT, recent admission for coronavirus infection/COPD exacerbation/CAP presents with fever, increased sputum production, and dysuria x 1-2 days, admitted meeting SIRS criteria, suspicious for pulmonary vs urinary source. Mild COPD exacerbation.

## 2019-01-29 NOTE — H&P ADULT - NSHPLABSRESULTS_GEN_ALL_CORE
EKG, labs, and imaging personally reviewed and interpreted.     EKG: sinus tachycardia, L axis deviation, T wave inversion I, aVL (unchanged from prior)    LABS:                        13.2   16.3  )-----------( 269      ( 2019 16:27 )             40.7     137  |  98  |  10  ----------------------------<  225<H>  4.4   |  24  |  0.66    Ca    9.4      2019 16:28    TPro  7.6  /  Alb  4.0  /  TBili  0.3  /  DBili  x   /  AST  14  /  ALT  10  /  AlkPhos  91      PT/INR - ( 2019 16:27 )   PT: 15.5 sec;   INR: 1.35 ratio      Urinalysis Basic - ( 2019 18:14 )    Color: Yellow / Appearance: Clear / S.024 / pH: x  Gluc: x / Ketone: Negative  / Bili: Negative / Urobili: Negative   Blood: x / Protein: 30 mg/dL / Nitrite: Negative   Leuk Esterase: Negative / RBC: 8 /hpf / WBC 2 /HPF   Sq Epi: x / Non Sq Epi: 1 /hpf / Bacteria: Negative    RADIOLOGY & ADDITIONAL TESTS:  Imaging Personally Reviewed:    EXAM:  CT ABDOMEN AND PELVIS                        EXAM:  CT CHEST                        PROCEDURE DATE:  2019    INTERPRETATION:  CLINICAL INFORMATION: Sepsis, cough and shortness of   breath, suprapubic and right-sided abdominal pain.     COMPARISON: CT chest 2019, CT chest abdomen and pelvis 2018,   CT abdomen and pelvis 2017.    PROCEDURE:   CT of the Chest, Abdomen and Pelvis was performed with intravenous   contrast.   Intravenous contrast: 90 ml Omnipaque 350. 10 ml discarded.  Oral contrast: positive contrast was administered.  Sagittal and coronal reformats were performed.    FINDINGS:    CHEST:   LUNGS AND LARGE AIRWAYS: Trace secretions within the bronchus   intermedius. There are unchanged scattered tree-in-bud opacities in the   right middle and lower lobes, some of which are calcified likely   secondary to chronic mucoid impaction. A 1.0 cm right basilar   pleural-based nodule is unchanged from 2018.  PLEURA: No pleural effusion.  VESSELS: Right chest port with the tip in the SVC.   HEART: Heart size is normal. No pericardial effusion. Aortic valve and   coronary artery calcifications.  MEDIASTINUM AND MARANDA: No lymphadenopathy.  CHEST WALL AND LOWER NECK: Within normal limits.    ABDOMEN AND PELVIS:  LIVER: Within normal limits.  BILE DUCTS: Normal caliber.  GALLBLADDER: Within normal limits.  SPLEEN: Within normal limits.  PANCREAS: Two exophytic cystic lesions measuring 2.2 cm and 1.8 cm in the   tail of the pancreas are unchanged dating back to CT dated 2017.   ADRENALS: Within normal limits.  KIDNEYS/URETERS: Parenchymal calcifications within the left kidney noted   are indeterminate.  BLADDER: Within normal limits.  REPRODUCTIVE ORGANS: Hysterectomy.  BOWEL: Status post distal proctocolectomy with left lower quadrant   colostomy. Grossly stable postoperative changes in the distal colectomy   considering differences in technique compared to 2018, however,   please note the bowel is suboptimally evaluated without oral and IV   contrast. No bowel obstruction. Appendix is not visualized.  PERITONEUM: No ascites.  VESSELS:  Atheromatous disease of the abdominal aorta.  RETROPERITONEUM: No lymphadenopathy.    ABDOMINAL WALL: Calcified injection granulomas in the left inner gluteal   soft tissues. Heterotopic ossification along the lower midline anterior   abdominal wall is unchanged.    BONES: Degenerative changes of the spine. Sclerotic lesions in the T4,   T8, and T11 vertebral bodies are unchanged. Status post fixation of the   pubic symphysis and right sacroiliac joint.    IMPRESSION:   No interval change compared to prior exam of 2018, with details as   above.     Consultant(s) Notes Reviewed:  Yes  Care Discussed with Consultants/Other Providers: Yes

## 2019-01-29 NOTE — ED ADULT NURSE NOTE - OBJECTIVE STATEMENT
69 y/o female hx Colon CA with colostomy, Asthma, CKD, COPD, DM presents to the ED from home c/o fever and productive cough x 1 day. Pt states she was at PCP office for Physical therapy clearance and was febrile and tachy- sent to ED for eval. Pt endorsing increased SOB, yellow sputum, dysuria, 1 episode of vomiting. Denies chills, weakness, abd pain, diarrhea/constipation, numbness/tingling. Pt A&Ox3, in no respiratory distress, no CP, no accessory muscle use, no cyanosis, wheezing in b/l lungs with rhonchi. PT safety maintained with family at bedside, call bell within reach and bed in the lowest position.

## 2019-01-29 NOTE — ED PROVIDER NOTE - PSH
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  H/O pelvic surgery  5 years ago - s/p fracture  H/O total knee replacement, bilateral  5 years ago  History of partial hysterectomy  30 years ago - fibroids  History of sinus surgery  multiple sinus surgeries  History of tracheomalacia  2015 - attempted tracheal stenting ([facility])- course complicated by obstruction, respiratory failure, multiple CPR attempts -  stent discontinued; 10/20/2016 Tracheobronchoplasty (Prolene Mesh) performed at [facility] by Dr Zapien  Rectal bleeding  exam under anesthesia ([facility]) 2/2018  S/P bronchoscopy  6/5/2018 - [facility] (Dr Zapien) no evidence of tracheobronchomalacia in trachea or bronchial tubes

## 2019-01-29 NOTE — ED ADULT NURSE REASSESSMENT NOTE - NS ED NURSE REASSESS COMMENT FT1
Pt AAOx4, NAD, resp nonlabored, resting comfortably in bed. Pt denies headache, dizziness, chest pain, palpitations, abd pain, n/v/d, urinary symptoms, chills, weakness at this time. Pt awaiting lab work results. Pt febrile and tachycardiac- Dr. Kwan at bedside advised no further intervention is needed. Safety maintained with call bell within reach.

## 2019-01-29 NOTE — H&P ADULT - PSH
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  H/O pelvic surgery  5 years ago - s/p fracture  H/O total knee replacement, bilateral  5 years ago  History of partial hysterectomy  30 years ago - fibroids  History of sinus surgery  multiple sinus surgeries  History of tracheomalacia  2015 - attempted tracheal stenting ([facility])- course complicated by obstruction, respiratory failure, multiple CPR attempts -  stent discontinued; 10/20/2016 Tracheobronchoplasty (Prolene Mesh) performed at [facility] by Dr Zapien  Rectal bleeding  exam under anesthesia ([facility]) 2/2018  S/P bronchoscopy  6/5/2018 - [facility] (Dr Zapien) no evidence of tracheobronchomalacia in trachea or bronchial tubes

## 2019-01-29 NOTE — CONSULT NOTE ADULT - ASSESSMENT
68 yr F with a PMH of severe asthma, bronchiectasis, tracheobronchomalacia s/p tracheo-bronchoplasty in 10/16 on chronic low dose Prednisone, Afib on Eliquis, DM2, HTN, Squamous cell CA of the anus on chemo/XRT, s/p abdominoperineal proctectomy for recurrent exophytic anal cancer in 7/18, recent admission to [facility] for acute hypoxic resp failure, coronavirus PNA, discharged with steroid taper who presents with fever x1 day. as per patient has "no idea where it is coming from". States SOB, wheeze and cough at baseline, not expectorating. +mild burning when urinating and pelvic fullness, discomfort. Status ostomy output is unchanged. Denies chest pain or abdominal pain otherwise. +Nausea with 1 episode of emesis this morning.     A/P: Fever for 1 day, 102 in ED. Mild leukocytosis, VBG WNL.  Otherwise nontoxic appearing.   Endorses suprapubic tenderness, dysuria, frequency - likely UTI.  Respiratory symptoms and exam at baseline  Has received Vanc, Cefepime and Azithro as well as Solumedrol and NS bolus x2 L so far.  c/w steroids,  Symbicort, Duonebs, Spiriva, Singulair, Flonase  Will f/u CXR and UA.    Dr. Allison to follow up on 1/30 if the patient is admitted to the hospital.

## 2019-01-29 NOTE — H&P ADULT - PROBLEM SELECTOR PLAN 4
--UA negative, but symptomatic, so could still be UTI source.   --cefepime will cover most organisms. F/u urine culture.

## 2019-01-29 NOTE — H&P ADULT - HISTORY OF PRESENT ILLNESS
HPI:      PAST MEDICAL & SURGICAL HISTORY:  TIA (transient ischemic attack): multiple, last 5 years ago - presents as right-sided weakness  DVT (deep venous thrombosis): 15-20 years ago, took coumadin  Seizure: x 1 1/7/18  Rectal bleeding  Colorectal cancer: 4/2018- last treatment , chemo and radiation  Tracheobronchomalacia: diagnosed 2015, s/p bronchial thermoplasty 2016 (Dr Zapien); recent bronchoscopy 6/5/2018 revealed no evidence of tracheobronchomalacia in trachea or bronchial tubes  Asthma  Pelvic fracture  Aortic insufficiency: moderate AR on echo 5/3/2018  Adrenal insufficiency: Medrol daily for over 50 years  COPD (chronic obstructive pulmonary disease)  Diabetes: Type 2  Atrial fibrillation: paroxysmal, on eliquis  Rectal bleeding: exam under anesthesia ([facility]) 2/2018  S/P bronchoscopy: 6/5/2018 - [facility] (Dr Zapien) no evidence of tracheobronchomalacia in trachea or bronchial tubes  History of tracheomalacia: 2015 - attempted tracheal stenting ([facility])- course complicated by obstruction, respiratory failure, multiple CPR attempts -  stent discontinued; 10/20/2016 Tracheobronchoplasty (Prolene Mesh) performed at [facility] by Dr Zapien  H/O pelvic surgery: 5 years ago - s/p fracture  Exostosis of orbit, 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 68F PMH severe asthma/COPD, tracheomalacia s/p tracheo-bronchoplasty (2016), anal SCC s/p chemo/RT/proctectomy with colostomy, adrenal insufficiency on chronic medrol, CKD, afib on apixaban, T2DM, remote DVT, recent admission for coronavirus infection/COPD exacerbation/CAP presents with fever, increased sputum production, and dysuria x 1-2 days. She was discharged 1/19 with a medrol taper after receiving IV steroids and a course of cefepime/azithromycin. Patient was planned to follow up with Dr. Leahy today to initiate pulmonary rehab, however about 1 day previously, she started to feel unwell. Had low grade temperature at home, fatigue, chills, mild increase in SOB, significant increase in sputum production (now white/yellow and thick), as well as some dysuria and slightly cloudy urine. At Dr. Leahy's office, had 102F fever and sent here. Has mild nausea and one episode of NBNB vomiting today. Thinks her coughing and wheezing are at her baseline. Denies syncope, chest pain, palpitations.

## 2019-01-29 NOTE — ED ADULT NURSE NOTE - PSH
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  H/O pelvic surgery  5 years ago - s/p fracture  H/O total knee replacement, bilateral  5 years ago  History of partial hysterectomy  30 years ago - fibroids  History of sinus surgery  multiple sinus surgeries  History of tracheomalacia  2015 - attempted tracheal stenting ([facility])- course complicated by obstruction, respiratory failure, multiple CPR attempts -  stent discontinued; 10/20/2016 Tracheobronchoplasty (Prolene Mesh) performed at [facility] by Dr Zapien  Rectal bleeding  exam under anesthesia ([facility]) 2/2018  S/P bronchoscopy  6/5/2018 - [facility] (Dr Zapien) no evidence of tracheobronchomalacia in trachea or bronchial tubes

## 2019-01-29 NOTE — ED PROVIDER NOTE - MEDICAL DECISION MAKING DETAILS
69 yo F with pmhx of Colon Cx s/p colectomy, CKD, asthma, afib, COPD, Diabetes, tracheomalacia, w/ fevers and tachycardia, new sputum changes w/ thick yellow coloring, rhonchorous breath sounds - concern for HCAP, meets SIRS, activated sepsis protocol, will cover and await scans of chest and abd, will check ua- also possible source given suprapubic pain on exam Dr. Butler (Attending Physician)  69 yo F with pmhx of Colon Cx s/p colectomy, CKD, asthma, afib, COPD, Diabetes, tracheomalacia, w/ fevers and tachycardia, new sputum changes w/ thick yellow coloring, rhonchorous breath sounds - concern for HCAP, meets SIRS, activated sepsis protocol, will cover and await scans of chest and abd, will check ua- also possible source given suprapubic pain on exam

## 2019-01-29 NOTE — H&P ADULT - PROBLEM SELECTOR PLAN 1
--tree-in-bud opacities remain on CT scan with evidence of chronic mucoid impaction. May be source of sepsis.   --check sputum culture. Check procalcitonin. Remains coronovirus positive.   --appreciate pulm recs. Will continue steroids, same pulmonary medications, duonebs PRN.   --c/w Vancomycin, cefepime for HCAP coverage. C/w azithromycin for atypical coverage.

## 2019-01-29 NOTE — H&P ADULT - PROBLEM SELECTOR PLAN 5
--hyperglycemia in the setting of steroid use and missed some insulin today.  --c/w Lantus 10U stat, humalog 5U TID with meals, HISS; titrate as needed.

## 2019-01-29 NOTE — ED PROVIDER NOTE - ATTENDING CONTRIBUTION TO CARE
Dr. Butler (Attending Physician)  I performed a history and physical exam of the patient and discussed their management with the resident. I reviewed the resident's note and agree with the documented findings and plan of care. My medical decision making and observations are found above.

## 2019-01-29 NOTE — H&P ADULT - NSHPREVIEWOFSYSTEMS_GEN_ALL_CORE
Review of Systems:   CONSTITUTIONAL: No fever, weight loss, or fatigue  EYES: No eye pain, visual disturbances, or discharge  ENMT:  No difficulty hearing, tinnitus, vertigo; No sinus or throat pain  NECK: No pain or stiffness  RESPIRATORY: No cough, wheezing, chills or hemoptysis; No shortness of breath  CARDIOVASCULAR: No chest pain, palpitations, dizziness, or leg swelling  GASTROINTESTINAL: No abdominal or epigastric pain. No nausea, vomiting, or hematemesis; No diarrhea or constipation. No melena or hematochezia.  GENITOURINARY: No dysuria, frequency, hematuria, or incontinence  NEUROLOGICAL: No headaches, memory loss, loss of strength, numbness, or tremors  SKIN: No itching, burning, rashes, or lesions   LYMPH NODES: No enlarged glands  ENDOCRINE: No heat or cold intolerance; No hair loss  MUSCULOSKELETAL: No joint pain or swelling; No muscle, back, or extremity pain  PSYCHIATRIC: No depression, anxiety, mood swings, or difficulty sleeping  HEME/LYMPH: No easy bruising, or bleeding gums  ALLERY AND IMMUNOLOGIC: No hives or eczema  [X] all other systems negative or as per HPI Review of Systems:   CONSTITUTIONAL: +fever, fatigue; No weight loss  EYES: No eye pain, visual disturbances, or discharge  ENMT:  No difficulty hearing, tinnitus, vertigo; No sinus or throat pain  NECK: No pain or stiffness  RESPIRATORY: +chills, chronic cough, increased sputum production, baseline SOB; No hemoptysis  CARDIOVASCULAR: No chest pain, palpitations, dizziness, or leg swelling  GASTROINTESTINAL: +lower abdominal discomfort, nausea; No vomiting, or hematemesis; No diarrhea or constipation. No melena or hematochezia.  GENITOURINARY: +dysuria, cloudy urine; No frequency, hematuria, or incontinence  NEUROLOGICAL: No headaches, memory loss, loss of strength, numbness, or tremors  SKIN: No itching, burning, rashes, or lesions   ENDOCRINE: No heat or cold intolerance; No hair loss  MUSCULOSKELETAL: No joint pain or swelling; No muscle, back, or extremity pain  PSYCHIATRIC: No depression, anxiety, mood swings, or difficulty sleeping  HEME/LYMPH: No easy bruising, or bleeding gums  ALLERY AND IMMUNOLOGIC: No hives or eczema  [X] all other systems negative or as per HPI

## 2019-01-29 NOTE — ED PROVIDER NOTE - NS ED ROS FT
Gen: No fever, normal appetite  Eyes: No eye irritation or discharge  ENT: No ear pain, congestion, sore throat  Resp: +cough + trouble breathing  Cardiovascular: No chest pain or palpitation  Gastroenteric: +nausea/vomiting, ostomy outpt changes  :  No change in urine output; +dysuria  MS: No joint or muscle pain  Skin: No rashes  Neuro: No headache; no abnormal movements  Remainder negative, except as per the HPI

## 2019-01-29 NOTE — H&P ADULT - NSHPSOCIALHISTORY_GEN_ALL_CORE
lives alone, , not a smoker, denies ETOH or drug use, supposed to ambulate with a walker but doesn't use it

## 2019-01-29 NOTE — ED ADULT NURSE NOTE - NSIMPLEMENTINTERV_GEN_ALL_ED
Implemented All Universal Safety Interventions:  [place] to call system. Call bell, personal items and telephone within reach. Instruct patient to call for assistance. Room bathroom lighting operational. Non-slip footwear when patient is off stretcher. Physically safe environment: no spills, clutter or unnecessary equipment. Stretcher in lowest position, wheels locked, appropriate side rails in place.

## 2019-01-29 NOTE — CONSULT NOTE ADULT - SUBJECTIVE AND OBJECTIVE BOX
[facility] - Division of Pulmonary, Critical Care and Sleep Medicine   Please call [phone] between 8am-pm weekdays, [phone] after hours and weekends      CHIEF COMPLAINT: Fever x1 day    HPI: 68 yr F with a PMH of severe asthma, bronchiectasis, tracheobronchomalacia s/p tracheo-bronchoplasty in 10/16 on chronic low dose Prednisone, Afib on Eliquis, DM2, HTN, Squamous cell CA of the anus on chemo/XRT, s/p abdominoperineal proctectomy for recurrent exophytic anal cancer in 7/18, recent admission to [facility] for acute hypoxic resp failure, coronavirus PNA, discharged with steroid taper who presents with fever x1 day. as per patient has "no idea where it is coming from". States SOB, wheeze and cough at baseline, not expectorating. +mild burning when urinating and pelvic fullness, discomfort. Status ostomy output is unchanged. Denies chest pain or abdominal pain otherwise. +Nausea with 1 episode of emesis this morning.       PAST MEDICAL & SURGICAL HISTORY:  TIA (transient ischemic attack): multiple, last 5 years ago - presents as right-sided weakness  DVT (deep venous thrombosis): 15-20 years ago, took coumadin  Seizure: x 1 1/7/18  Rectal bleeding  Colorectal cancer: 4/2018- last treatment , chemo and radiation  Tracheobronchomalacia: diagnosed 2015, s/p bronchial thermoplasty 2016 (Dr Zapien); recent bronchoscopy 6/5/2018 revealed no evidence of tracheobronchomalacia in trachea or bronchial tubes  Asthma  Pelvic fracture  Aortic insufficiency: moderate AR on echo 5/3/2018  Adrenal insufficiency: Medrol daily for over 50 years  COPD (chronic obstructive pulmonary disease)  Diabetes: Type 2  Atrial fibrillation: paroxysmal, on eliquis  Rectal bleeding: exam under anesthesia ([facility]) 2/2018  S/P bronchoscopy: 6/5/2018 - Shirley Cavazos (Dr Zapien) no evidence of tracheobronchomalacia in trachea or bronchial tubes  History of tracheomalacia: 2015 - attempted tracheal stenting ([facility])- course complicated by obstruction, respiratory failure, multiple CPR attempts -  stent discontinued; 10/20/2016 Tracheobronchoplasty (Prolene Mesh) performed at [facility] by Dr Zapien  H/O pelvic surgery: 5 years ago - s/p fracture  Exostosis of orbit, 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  History of sinus surgery: multiple sinus surgeries  H/O total knee replacement, bilateral: 5 years ago  History of partial hysterectomy: 30 years ago - fibroids      FAMILY HISTORY:  Family history of diabetes mellitus type II  Family history of breast cancer (Sibling)  Family history of asthma      SOCIAL HISTORY:  Smoking: [x ] Never Smoked [ ] Former Smoker (__ packs x ___ years) [ ] Current Smoker  (__ packs x ___ years)  Substance Use: [x ] Never Used [ ] Used ____  EtOH Use: Social  Marital Status: [ ] Single [ ]  [ ]  [x ]   Sexual History: NC  Occupation: Retired    Recent Travel: None  Advance Directives: Full code     Allergies    ampicillin (Short breath)  aspirin (Short breath)  Avelox (Short breath; Pruritus)  codeine (Short breath)  Dilaudid (Short breath)  iodine (Short breath; Swelling)  penicillin (Short breath)  shellfish (Anaphylaxis)  tetanus toxoid (Short breath)  Valium (Short breath)    Intolerances        HOME MEDICATIONS: reviewed    REVIEW OF SYSTEMS:  Constitutional: [ +] fevers [+ ] chills [ ] weight loss [ ] weight gain  HEENT: [- ] dry eyes [ ] eye irritation [ ] postnasal drip [- ] nasal congestion  CV: [- ] chest pain [ ] orthopnea [+ ] palpitations [ ] murmur  Resp: [+ ] cough [- ] shortness of breath [+ ] wheezing [- ] sputum [ ] hemoptysis  GI: [+ ] nausea [+ ] vomiting [- ] diarrhea [ ] constipation [- ] abd pain [ ] dysphagia   : [+ ] dysuria [ ] nocturia [ ] hematuria [+ ] increased urinary frequency  Musculoskeletal: [- ] myalgias [ ] arthralgias   Skin: [ -] rash [ ] itch  [x ] All other systems negative  [ ] Unable to assess ROS because ________    OBJECTIVE:  ICU Vital Signs Last 24 Hrs  T(C): 39.1 (29 Jan 2019 15:23), Max: 39.1 (29 Jan 2019 15:23)  T(F): 102.4 (29 Jan 2019 15:23), Max: 102.4 (29 Jan 2019 15:23)  HR: 124 (29 Jan 2019 16:02) (124 - 131)  BP: 115/69 (29 Jan 2019 16:02) (115/69 - 126/81)  BP(mean): --  ABP: --  ABP(mean): --  RR: 18 (29 Jan 2019 16:02) (18 - 18)  SpO2: 95% (29 Jan 2019 16:02) (95% - 95%)        CAPILLARY BLOOD GLUCOSE          PHYSICAL EXAM:  General:  NAD  HEENT:  NC/AT  Neck: Supple  Respiratory:  moving air throughout, bronchial breath sounds with scattered wheezes. no crackles or rhonchi  Cardiovascular:  RRR, no m/r/g  Abdomen: soft, NT/ND, +BS. +ileostomy. +suprapubic ttp  Extremities: no clubbing, cyanosis or edema, warm  Skin: no rash  Neurological: AAOx3, no focal deficits  Psychiatry: not anxious, normal affect    HOSPITAL MEDICATIONS:  MEDICATIONS  (STANDING):  azithromycin  IVPB 500 milliGRAM(s) IV Intermittent once  vancomycin  IVPB 1000 milliGRAM(s) IV Intermittent once    MEDICATIONS  (PRN):      LABS:                        13.2   16.3  )-----------( 269      ( 29 Jan 2019 16:27 )             40.7     Hgb Trend: 13.2<--  01-29    137  |  98  |  10  ----------------------------<  225<H>  4.4   |  24  |  0.66    Ca    9.4      29 Jan 2019 16:28    TPro  7.6  /  Alb  4.0  /  TBili  0.3  /  DBili  x   /  AST  14  /  ALT  10  /  AlkPhos  91  01-29    Creatinine Trend: 0.66<--, 0.65<--, 0.58<--, 0.57<--, 0.57<--, 0.63<--  PT/INR - ( 29 Jan 2019 16:27 )   PT: 15.5 sec;   INR: 1.35 ratio               Venous Blood Gas:  01-29 @ 16:39  7.43/40/54/27/85  VBG Lactate: 2.4      MICROBIOLOGY:  blood cultures pending    RADIOLOGY: CXR pending  [ ] Reviewed and interpreted by me    < from: CT Chest No Cont (01.12.19 @ 14:22) >      PROCEDURE DATE:  01/12/2019            INTERPRETATION:  CLINICAL INFORMATION: Fever and cough. Anal cancer.     COMPARISON: Chest radiograph 1/11/2019. CT chest 12/13/2018    PROCEDURE:   CT of the Chest was performed without intravenous contrast.  Sagittal and coronal reformats were performed.      FINDINGS:    CHEST:     LUNGS AND LARGE AIRWAYS: Small amount of secretions in the mid trachea.   Central airways are otherwise patent. Unchanged scatteredtree-in-bud   opacities in the right upper and lower lobes  A 1.0 cm right basilar   pleural-based nodule is unchanged from 12/13/2018. No new consolidations.  Minimal Basilar atelectasis. Minimal bibasilar bronchiectatic changes.  PLEURA: No pleuraleffusion.  VESSELS: Distal tip of a right chest port is in the SVC. Atherosclerotic   changes of the aorta.  HEART: Heart size is normal. Aortic valve calcifications. No pericardial   effusion.  MEDIASTINUM AND MARANDA: No lymphadenopathy.  CHEST WALL AND LOWER NECK: Right chest wall port in unchanged position.  VISUALIZED UPPER ABDOMEN: Within normal limits.  BONES: Degenerative spinal changes. Unchanged sclerotic lesions within   the T4, T8, and T11 vertebral bodies.    IMPRESSION:     Tree-in-budnodules in the right lung consistent with small airway mucoid   impaction, likely infectious in etiology, not significant change.    Bibasilar atelectasis.    Minimal bibasilar bronchiectatic changes.    Unchanged 1.0 cm right basilar pleural-based nodule.      YAJAIRA BASURTO M.D., RADIOLOGY RESIDENT  This document has been electronically signed.  PETE NICE M.D., ATTENDING RADIOLOGIST  This document has been electronically signed. Jan 12 2019  4:50PM    < end of copied text >      EKG: not available for review.

## 2019-01-30 DIAGNOSIS — I82.409 ACUTE EMBOLISM AND THROMBOSIS OF UNSPECIFIED DEEP VEINS OF UNSPECIFIED LOWER EXTREMITY: ICD-10-CM

## 2019-01-30 DIAGNOSIS — J39.8 OTHER SPECIFIED DISEASES OF UPPER RESPIRATORY TRACT: ICD-10-CM

## 2019-01-30 DIAGNOSIS — J44.1 CHRONIC OBSTRUCTIVE PULMONARY DISEASE WITH (ACUTE) EXACERBATION: ICD-10-CM

## 2019-01-30 DIAGNOSIS — R93.89 ABNORMAL FINDINGS ON DIAGNOSTIC IMAGING OF OTHER SPECIFIED BODY STRUCTURES: ICD-10-CM

## 2019-01-30 DIAGNOSIS — I48.91 UNSPECIFIED ATRIAL FIBRILLATION: ICD-10-CM

## 2019-01-30 DIAGNOSIS — E11.9 TYPE 2 DIABETES MELLITUS WITHOUT COMPLICATIONS: ICD-10-CM

## 2019-01-30 DIAGNOSIS — Z29.9 ENCOUNTER FOR PROPHYLACTIC MEASURES, UNSPECIFIED: ICD-10-CM

## 2019-01-30 DIAGNOSIS — R06.02 SHORTNESS OF BREATH: ICD-10-CM

## 2019-01-30 DIAGNOSIS — E27.40 UNSPECIFIED ADRENOCORTICAL INSUFFICIENCY: ICD-10-CM

## 2019-01-30 DIAGNOSIS — J45.50 SEVERE PERSISTENT ASTHMA, UNCOMPLICATED: ICD-10-CM

## 2019-01-30 DIAGNOSIS — J18.9 PNEUMONIA, UNSPECIFIED ORGANISM: ICD-10-CM

## 2019-01-30 DIAGNOSIS — A41.9 SEPSIS, UNSPECIFIED ORGANISM: ICD-10-CM

## 2019-01-30 DIAGNOSIS — R50.9 FEVER, UNSPECIFIED: ICD-10-CM

## 2019-01-30 DIAGNOSIS — R30.0 DYSURIA: ICD-10-CM

## 2019-01-30 LAB
ANION GAP SERPL CALC-SCNC: 10 MMOL/L — SIGNIFICANT CHANGE UP (ref 5–17)
BUN SERPL-MCNC: 11 MG/DL — SIGNIFICANT CHANGE UP (ref 7–23)
CALCIUM SERPL-MCNC: 9.4 MG/DL — SIGNIFICANT CHANGE UP (ref 8.4–10.5)
CHLORIDE SERPL-SCNC: 101 MMOL/L — SIGNIFICANT CHANGE UP (ref 96–108)
CO2 SERPL-SCNC: 26 MMOL/L — SIGNIFICANT CHANGE UP (ref 22–31)
CREAT SERPL-MCNC: 0.51 MG/DL — SIGNIFICANT CHANGE UP (ref 0.5–1.3)
CULTURE RESULTS: NO GROWTH — SIGNIFICANT CHANGE UP
GLUCOSE BLDC GLUCOMTR-MCNC: 139 MG/DL — HIGH (ref 70–99)
GLUCOSE BLDC GLUCOMTR-MCNC: 174 MG/DL — HIGH (ref 70–99)
GLUCOSE BLDC GLUCOMTR-MCNC: 239 MG/DL — HIGH (ref 70–99)
GLUCOSE BLDC GLUCOMTR-MCNC: 365 MG/DL — HIGH (ref 70–99)
GLUCOSE SERPL-MCNC: 328 MG/DL — HIGH (ref 70–99)
LACTATE SERPL-SCNC: 1.6 MMOL/L — SIGNIFICANT CHANGE UP (ref 0.7–2)
MAGNESIUM SERPL-MCNC: 2.1 MG/DL — SIGNIFICANT CHANGE UP (ref 1.6–2.6)
PHOSPHATE SERPL-MCNC: 2.9 MG/DL — SIGNIFICANT CHANGE UP (ref 2.5–4.5)
POTASSIUM SERPL-MCNC: 4.7 MMOL/L — SIGNIFICANT CHANGE UP (ref 3.5–5.3)
POTASSIUM SERPL-SCNC: 4.7 MMOL/L — SIGNIFICANT CHANGE UP (ref 3.5–5.3)
PROCALCITONIN SERPL-MCNC: 0.17 NG/ML — HIGH (ref 0.02–0.1)
SODIUM SERPL-SCNC: 137 MMOL/L — SIGNIFICANT CHANGE UP (ref 135–145)
SPECIMEN SOURCE: SIGNIFICANT CHANGE UP

## 2019-01-30 PROCEDURE — 99223 1ST HOSP IP/OBS HIGH 75: CPT

## 2019-01-30 PROCEDURE — 99233 SBSQ HOSP IP/OBS HIGH 50: CPT

## 2019-01-30 RX ORDER — INFLUENZA VIRUS VACCINE 15; 15; 15; 15 UG/.5ML; UG/.5ML; UG/.5ML; UG/.5ML
0.5 SUSPENSION INTRAMUSCULAR ONCE
Qty: 0 | Refills: 0 | Status: COMPLETED | OUTPATIENT
Start: 2019-01-30 | End: 2019-01-30

## 2019-01-30 RX ORDER — INSULIN LISPRO 100/ML
VIAL (ML) SUBCUTANEOUS
Qty: 0 | Refills: 0 | Status: DISCONTINUED | OUTPATIENT
Start: 2019-01-30 | End: 2019-02-06

## 2019-01-30 RX ORDER — ALPRAZOLAM 0.25 MG
0.25 TABLET ORAL ONCE
Qty: 0 | Refills: 0 | Status: DISCONTINUED | OUTPATIENT
Start: 2019-01-30 | End: 2019-01-30

## 2019-01-30 RX ORDER — INSULIN GLARGINE 100 [IU]/ML
15 INJECTION, SOLUTION SUBCUTANEOUS AT BEDTIME
Qty: 0 | Refills: 0 | Status: DISCONTINUED | OUTPATIENT
Start: 2019-01-30 | End: 2019-02-02

## 2019-01-30 RX ORDER — INSULIN LISPRO 100/ML
VIAL (ML) SUBCUTANEOUS AT BEDTIME
Qty: 0 | Refills: 0 | Status: DISCONTINUED | OUTPATIENT
Start: 2019-01-30 | End: 2019-02-06

## 2019-01-30 RX ADMIN — Medication 5 UNIT(S): at 18:12

## 2019-01-30 RX ADMIN — Medication 2: at 00:03

## 2019-01-30 RX ADMIN — CEFEPIME 100 MILLIGRAM(S): 1 INJECTION, POWDER, FOR SOLUTION INTRAMUSCULAR; INTRAVENOUS at 11:52

## 2019-01-30 RX ADMIN — INSULIN GLARGINE 15 UNIT(S): 100 INJECTION, SOLUTION SUBCUTANEOUS at 22:45

## 2019-01-30 RX ADMIN — APIXABAN 5 MILLIGRAM(S): 2.5 TABLET, FILM COATED ORAL at 18:57

## 2019-01-30 RX ADMIN — BUDESONIDE AND FORMOTEROL FUMARATE DIHYDRATE 2 PUFF(S): 160; 4.5 AEROSOL RESPIRATORY (INHALATION) at 22:46

## 2019-01-30 RX ADMIN — Medication 250 MILLIGRAM(S): at 06:27

## 2019-01-30 RX ADMIN — POLYETHYLENE GLYCOL 3350 17 GRAM(S): 17 POWDER, FOR SOLUTION ORAL at 06:23

## 2019-01-30 RX ADMIN — Medication 0.25 MILLIGRAM(S): at 06:23

## 2019-01-30 RX ADMIN — MONTELUKAST 10 MILLIGRAM(S): 4 TABLET, CHEWABLE ORAL at 01:24

## 2019-01-30 RX ADMIN — Medication 100 MILLIGRAM(S): at 22:56

## 2019-01-30 RX ADMIN — Medication 3 MILLILITER(S): at 22:44

## 2019-01-30 RX ADMIN — Medication 100 MILLIGRAM(S): at 00:26

## 2019-01-30 RX ADMIN — INSULIN GLARGINE 10 UNIT(S): 100 INJECTION, SOLUTION SUBCUTANEOUS at 00:02

## 2019-01-30 RX ADMIN — CEFEPIME 100 MILLIGRAM(S): 1 INJECTION, POWDER, FOR SOLUTION INTRAMUSCULAR; INTRAVENOUS at 22:46

## 2019-01-30 RX ADMIN — Medication 1 SPRAY(S): at 06:22

## 2019-01-30 RX ADMIN — Medication 5: at 13:58

## 2019-01-30 RX ADMIN — APIXABAN 5 MILLIGRAM(S): 2.5 TABLET, FILM COATED ORAL at 00:26

## 2019-01-30 RX ADMIN — Medication 20 MILLIGRAM(S): at 06:22

## 2019-01-30 RX ADMIN — Medication 20 MILLIGRAM(S): at 18:56

## 2019-01-30 RX ADMIN — Medication 0.25 MILLIGRAM(S): at 01:23

## 2019-01-30 RX ADMIN — Medication 5 UNIT(S): at 13:59

## 2019-01-30 RX ADMIN — PANTOPRAZOLE SODIUM 40 MILLIGRAM(S): 20 TABLET, DELAYED RELEASE ORAL at 06:23

## 2019-01-30 RX ADMIN — Medication 100 MILLIGRAM(S): at 13:59

## 2019-01-30 RX ADMIN — SENNA PLUS 2 TABLET(S): 8.6 TABLET ORAL at 22:56

## 2019-01-30 RX ADMIN — Medication 3: at 09:54

## 2019-01-30 RX ADMIN — Medication 0.25 MILLIGRAM(S): at 22:45

## 2019-01-30 RX ADMIN — POLYETHYLENE GLYCOL 3350 17 GRAM(S): 17 POWDER, FOR SOLUTION ORAL at 18:57

## 2019-01-30 RX ADMIN — AZITHROMYCIN 250 MILLIGRAM(S): 500 TABLET, FILM COATED ORAL at 22:46

## 2019-01-30 NOTE — CONSULT NOTE ADULT - SUBJECTIVE AND OBJECTIVE BOX
ID CONSULTATION--Sergio Lai MD  Pager [phone]    Patient is a 68y old  Female who presents with a chief complaint of fever, increased sputum production, dysuria (30 Jan 2019 15:49)    HPI:  68F PMH severe asthma/COPD, tracheomalacia s/p tracheo-bronchoplasty (2016), anal SCC s/p chemo/RT/proctectomy with colostomy, adrenal insufficiency on chronic medrol--- now at 4 mg daily, CKD, afib on apixaban, T2DM, remote DVT, recent admission for coronavirus infection/COPD exacerbation/CAP presents with fever, increased sputum production, and dysuria x 1-2 days. She was discharged 1/19 with a medrol taper after receiving IV steroids and a course of cefepime/azithromycin. Patient was planned to follow up with Dr. Leahy today to initiate pulmonary rehab, however about 1 day previously, she started to feel unwell. Had low grade temperature at home, fatigue, chills, mild increase in SOB, significant increase in sputum production (now white/yellow and thick), as well as some dysuria and slightly cloudy urine. At Dr. Leahy's office, had 102F fever and sent here. Has mild nausea and one episode of NBNB vomiting today. Thinks her coughing and wheezing are at her baseline. Denies syncope, chest pain, palpitations. (29 Jan 2019 23:30)    PAST MEDICAL & SURGICAL HISTORY:  TIA (transient ischemic attack): multiple, last 5 years ago - presents as right-sided weakness  DVT (deep venous thrombosis): 15-20 years ago, took coumadin  Seizure: x 1 1/7/18  Rectal bleeding  Colorectal cancer: 4/2018- last treatment , chemo and radiation  Tracheobronchomalacia: diagnosed 2015, s/p bronchial thermoplasty 2016 (Dr Zapien); recent bronchoscopy 6/5/2018 revealed no evidence of tracheobronchomalacia in trachea or bronchial tubes  Asthma  Pelvic fracture  Aortic insufficiency: moderate AR on echo 5/3/2018  Adrenal insufficiency: Medrol daily for over 50 years  COPD (chronic obstructive pulmonary disease)  Diabetes: Type 2  Atrial fibrillation: paroxysmal, on eliquis  Rectal bleeding: exam under anesthesia ([facility]) 2/2018  S/P bronchoscopy: 6/5/2018 - Shirley Cavazos (Dr Zapien) no evidence of tracheobronchomalacia in trachea or bronchial tubes  History of tracheomalacia: 2015 - attempted tracheal stenting ([facility])- course complicated by obstruction, respiratory failure, multiple CPR attempts -  stent discontinued; 10/20/2016 Tracheobronchoplasty (Prolene Mesh) performed at [facility] by Dr Zapien  H/O pelvic surgery: 5 years ago - s/p fracture  Exostosis of orbit, 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  History of sinus surgery: multiple sinus surgeries  H/O total knee replacement, bilateral: 5 years ago  History of partial hysterectomy: 30 years ago - fibroids    SOCIAL: no tobacco, lives alone, not working    FAMILY HISTORY:  Family history of diabetes mellitus type II  Family history of breast cancer (Sibling)  Family history of asthma    FURTHER REVIEW OF SYSTEMS  Skin:No rash	  Ophthalmologic:Denies any visual complaints,discharge redness or photophobia	  ENMT:No nasal discharge,headache,sinus congestion or throat pain.No dental complaints  Respiratory and Thorax: cough,sputum	  Cardiovascular:	No chest pain,palpitaions or dizziness  Gastrointestinal:	NO nausea,abdominal pain or diarrhea.  Genitourinary:	No dysuria,frequency. No flank pain  Musculoskeletal:	No joint swelling or pain.No weakness  Neurological:No confusion,diziness.No extremity weakness.No bladder or bowel incontinence	  Psychiatric:No delusions or hallucinations	    Allergies  ampicillin (Short breath)  aspirin (Short breath)  Avelox (Short breath; Pruritus)  codeine (Short breath)  Dilaudid (Short breath)  iodine (Short breath; Swelling)  penicillin (Short breath)  shellfish (Anaphylaxis)  tetanus toxoid (Short breath)  Valium (Short breath)    ANTIMICROBIALS:  azithromycin  IVPB 500 milliGRAM(s) IV Intermittent every 24 hours  cefepime   IVPB 2000 milliGRAM(s) IV Intermittent every 8 hours  vancomycin  IVPB 1000 milliGRAM(s) IV Intermittent every 12 hours    Vital Signs Last 24 Hrs  T(C): 36.9 (30 Jan 2019 15:37), Max: 38.8 (29 Jan 2019 18:05)  T(F): 98.4 (30 Jan 2019 15:37), Max: 101.8 (29 Jan 2019 18:05)  HR: 82 (30 Jan 2019 15:37) (73 - 116)  BP: 114/63 (30 Jan 2019 15:37) (95/57 - 132/71)  BP(mean): --  RR: 18 (30 Jan 2019 15:37) (17 - 19)  SpO2: 99% (30 Jan 2019 15:37) (94% - 99%)    PHYSICAL EXAM:Pleasant patient in no acute distress.  Constitutional:Comfortable.Awake and alert  Eyes:PERRL EOMI.NO discharge or conjunctival injection  ENMT:No sinus tenderness.No thrush.No pharyngeal exudate or erythema.Fair dental hygiene  Neck:Supple,No LN,no JVD  Respiratory:Good air entry bilaterally, few rhonchi BL more on R than L  Cardiovascular:S1 S2 wnl, No murmurs,rub or gallops  Gastrointestinal:Soft BS(+) no tenderness no masses ,No rebound or guarding  Genitourinary:No CVA tendereness   Extremities:No cyanosis,clubbing or edema.  Vascular:peripheral pulses felt  Skin:No rash   Lymph Nodes:No palpable LNs  Musculoskeletal:No joint swelling or LOM                        11.4   15.6  )-----------( 202      ( 30 Jan 2019 08:44 )             35.0     01-30    137  |  101  |  11  ----------------------------<  328<H>  4.7   |  26  |  0.51    Ca    9.4      30 Jan 2019 08:42  Phos  2.9     01-30  Mg     2.1     01-30    TPro  7.6  /  Alb  4.0  /  TBili  0.3  /  DBili  x   /  AST  14  /  ALT  10  /  AlkPhos  91  01-29    RECENT CULTURES:  sputum with Pseudomonas S cefepime    RADIOLOGY:  < from: CT Chest No Cont (01.29.19 @ 17:32) >    CHEST:     LUNGS AND LARGE AIRWAYS: Trace secretions within the bronchus   intermedius. There are unchangedscattered tree-in-bud opacities in the   right middle and lower lobes, some of which are calcified likely   secondary to chronic mucoid impaction. A 1.0 cm right basilar   pleural-based nodule is unchanged from 12/13/2018.    PLEURA: No pleural effusion.    VESSELS: Right chest port with the tip in the SVC.     HEART: Heart size is normal. No pericardial effusion. Aortic valve and   coronary artery calcifications.    MEDIASTINUM AND MARANDA: No lymphadenopathy.    CHEST WALL AND LOWER NECK: Within normal limits.    ABDOMEN AND PELVIS:    LIVER: Within normal limits.  BILE DUCTS: Normal caliber.  GALLBLADDER: Within normal limits.  SPLEEN: Within normal limits.    PANCREAS: Two exophytic cystic lesions measuring 2.2 cm and 1.8 cm in the   tail ofthe pancreas are unchanged dating back to CT dated 5/11/2017.     ADRENALS: Within normal limits.    KIDNEYS/URETERS: Parenchymal calcifications within the left kidney noted   are indeterminate.      < end of copied text >    IMPRESSION:  The patient has fevers and elevated WBC count.  Cultures pending.  Chest CT without sig changes.  DDx includes the cornovirus (less likely as it has been PCR present for few weeks), Pna (though chest ct not much different), UTI, doubt abd infection, occult bacteremia....      Rx:  can dc the vanco---doubt mrsa, continue cefepime and azith for now (though i'm unconvinced she needs any atypical coverage).    to order crypt ag, cmv pcr in view of the chronic steroids.

## 2019-01-30 NOTE — PROGRESS NOTE ADULT - SUBJECTIVE AND OBJECTIVE BOX
Bear Box MD  Pager [phone]  Spectra [phone]  Cell Phone [phone]    Patient is a 68y old  Female who presents with a chief complaint of fever, increased sputum production, dysuria (2019 06:23)        SUBJECTIVE / OVERNIGHT EVENTS: Patient states breathing improved. Occasional palpitations.      MEDICATIONS  (STANDING):  apixaban 5 milliGRAM(s) Oral every 12 hours  azithromycin  IVPB 500 milliGRAM(s) IV Intermittent every 24 hours  buDESOnide 160 MICROgram(s)/formoterol 4.5 MICROgram(s) Inhaler 2 Puff(s) Inhalation two times a day  cefepime   IVPB 2000 milliGRAM(s) IV Intermittent every 8 hours  dextrose 5%. 1000 milliLiter(s) (50 mL/Hr) IV Continuous <Continuous>  dextrose 50% Injectable 12.5 Gram(s) IV Push once  dextrose 50% Injectable 25 Gram(s) IV Push once  dextrose 50% Injectable 25 Gram(s) IV Push once  digoxin     Tablet 0.25 milliGRAM(s) Oral daily  docusate sodium 100 milliGRAM(s) Oral three times a day  fluticasone propionate 50 MICROgram(s)/spray Nasal Spray 1 Spray(s) Both Nostrils two times a day  influenza   Vaccine 0.5 milliLiter(s) IntraMuscular once  insulin glargine Injectable (LANTUS) 10 Unit(s) SubCutaneous at bedtime  insulin lispro (HumaLOG) corrective regimen sliding scale   SubCutaneous three times a day before meals  insulin lispro (HumaLOG) corrective regimen sliding scale   SubCutaneous at bedtime  insulin lispro Injectable (HumaLOG) 5 Unit(s) SubCutaneous three times a day before meals  methylPREDNISolone sodium succinate Injectable 20 milliGRAM(s) IV Push every 12 hours  montelukast 10 milliGRAM(s) Oral daily  pantoprazole    Tablet 40 milliGRAM(s) Oral before breakfast  polyethylene glycol 3350 17 Gram(s) Oral every 12 hours  senna 2 Tablet(s) Oral at bedtime  tiotropium 18 MICROgram(s) Capsule 1 Capsule(s) Inhalation daily  vancomycin  IVPB 1000 milliGRAM(s) IV Intermittent every 12 hours    MEDICATIONS  (PRN):  acetaminophen   Tablet .. 975 milliGRAM(s) Oral every 6 hours PRN Temp greater or equal to 38C (100.4F), Mild Pain (1 - 3), Moderate Pain (4 - 6)  ALBUTerol/ipratropium for Nebulization 3 milliLiter(s) Nebulizer every 6 hours PRN Shortness of Breath and/or Wheezing  dextrose 40% Gel 15 Gram(s) Oral once PRN Blood Glucose LESS THAN 70 milliGRAM(s)/deciliter  glucagon  Injectable 1 milliGRAM(s) IntraMuscular once PRN Glucose LESS THAN 70 milligrams/deciliter  guaiFENesin  milliGRAM(s) Oral every 12 hours PRN Cough  metoclopramide 5 milliGRAM(s) Oral three times a day PRN Nausea  simethicone 80 milliGRAM(s) Chew four times a day PRN Gas  traMADol 25 milliGRAM(s) Oral every 4 hours PRN Severe Pain (7 - 10)      Vital Signs Last 24 Hrs  T(C): 36.9 (2019 15:37), Max: 38.8 (2019 18:05)  T(F): 98.4 (2019 15:37), Max: 101.8 (2019 18:05)  HR: 82 (2019 15:37) (73 - 124)  BP: 114/63 (2019 15:37) (95/57 - 132/71)  BP(mean): --  RR: 18 (2019 15:37) (17 - 19)  SpO2: 99% (2019 15:37) (94% - 99%)  CAPILLARY BLOOD GLUCOSE      POCT Blood Glucose.: 365 mg/dL (2019 13:12)  POCT Blood Glucose.: 275 mg/dL (2019 09:00)  POCT Blood Glucose.: 309 mg/dL (2019 23:57)  POCT Blood Glucose.: 387 mg/dL (2019 21:46)    I&O's Summary    2019 07:01  -  2019 15:50  --------------------------------------------------------  IN: 0 mL / OUT: 380 mL / NET: -380 mL          PHYSICAL EXAM  GENERAL: NAD, well-developed  HEAD:  Atraumatic, Normocephalic  EYES: EOMI, PERRLA, conjunctiva and sclera clear  CHEST/LUNG: Diffuse rhonchi b/l; No wheezes  HEART: Regular rate and rhythm; No murmurs, rubs, or gallops  ABDOMEN: Soft, Nontender, Nondistended; Bowel sounds present; L sided ostomy  EXTREMITIES:  2+ Peripheral Pulses, No clubbing, cyanosis, or edema  PSYCH: AAOx3      LABS:                        11.4   15.6  )-----------( 202      ( 2019 08:44 )             35.0         137  |  101  |  11  ----------------------------<  328<H>  4.7   |  26  |  0.51    Ca    9.4      2019 08:42  Phos  2.9       Mg     2.1         TPro  7.6  /  Alb  4.0  /  TBili  0.3  /  DBili  x   /  AST  14  /  ALT  10  /  AlkPhos  91      PT/INR - ( 2019 16:27 )   PT: 15.5 sec;   INR: 1.35 ratio               Urinalysis Basic - ( 2019 18:14 )    Color: Yellow / Appearance: Clear / S.024 / pH: x  Gluc: x / Ketone: Negative  / Bili: Negative / Urobili: Negative   Blood: x / Protein: 30 mg/dL / Nitrite: Negative   Leuk Esterase: Negative / RBC: 8 /hpf / WBC 2 /HPF   Sq Epi: x / Non Sq Epi: 1 /hpf / Bacteria: Negative          RADIOLOGY & ADDITIONAL TESTS:    Imaging Personally Reviewed:  Consultant(s) Notes Reviewed:    Care Discussed with Consultants/Other Providers:

## 2019-01-30 NOTE — PROGRESS NOTE ADULT - ATTENDING COMMENTS
as above--  multifactorial sob--AF/AV, asthma exacerbation, bronchiectasis, TBM, CB in face of corona virus  ID--?need for ABX--check sputum and urine  Asthma-solumedrol 20 q 6, symbicort, spiriva, singulair  TBM-vest rx.acapella  DVT-on rx  cards-as per Tosin Allison MD-Pulmonary   [phone]

## 2019-01-30 NOTE — PROGRESS NOTE ADULT - PROBLEM SELECTOR PLAN 3
- c/w Lantus 10U qhs, humalog 5U TID with meals, HISS; titrate as needed  - Last HgA1c 8.3 - Increase Lantus to 15 U qhs, continue humalog 5U TID with meals, HISS; titrate as needed  - Last HgA1c 8.3

## 2019-01-30 NOTE — PROGRESS NOTE ADULT - ASSESSMENT
68 yr F with a PMH of severe asthma, bronchiectasis, tracheobronchomalacia s/p tracheo-bronchoplasty in 10/16 on chronic low dose Prednisone, Afib on Eliquis, DM2, HTN, Squamous cell CA of the anus on chemo/XRT, s/p abdominoperineal proctectomy for recurrent exophytic anal cancer in 7/18, recent admission to [facility] for acute hypoxic resp failure, coronavirus PNA, discharged with steroid taper who presents with fever x1 day. as per patient has "no idea where it is coming from". States SOB, wheeze and cough at baseline, not expectorating. +mild burning when urinating and pelvic fullness, discomfort. Status ostomy output is unchanged. Denies chest pain or abdominal pain otherwise. +Nausea with 1 episode of emesis this morning.     A/P: Fever for 1 day, 102 in ED. Mild leukocytosis, VBG WNL.  Otherwise nontoxic appearing.   Endorses suprapubic tenderness, dysuria, frequency - likely UTI.  Respiratory symptoms and exam at baseline  Has received Vanc, Cefepime and Azithro as well as Solumedrol and NS bolus x2 L so far.  c/w steroids,  Symbicort, Duonebs, Spiriva, Singulair, Flonase  CT chest no PNA; no change from prior ct    See below::

## 2019-01-30 NOTE — PROGRESS NOTE ADULT - SUBJECTIVE AND OBJECTIVE BOX
CHIEF COMPLAINT: f/up for sob, TBM, bronchiectasis, asthma, recurrent PNA-corona virus--still wheeze and cough-some mucus    Interval Events: CT no change from     REVIEW OF SYSTEMS:  Constitutional: No fevers or chills. No weight loss. + fatigue or generalized malaise.  Eyes: No itching or discharge from the eyes  ENT: No ear pain. No ear discharge. No nasal congestion. No post nasal drip. No epistaxis. No throat pain. No sore throat. No difficulty swallowing.   CV: No chest pain. No palpitations. No lightheadedness or dizziness.   Resp: No dyspnea at rest. + dyspnea on exertion. No orthopnea. +wheezing. + cough. No stridor. + sputum production. No chest pain with respiration.  GI: No nausea. No vomiting. No diarrhea.  MSK: No joint pain or pain in any extremities  Integumentary: No skin lesions. No pedal edema.  Neurological: No gross motor weakness. No sensory changes.  + All other systems negative  [ ] Unable to assess ROS because ________    OBJECTIVE:  ICU Vital Signs Last 24 Hrs  T(C): 36.5 (2019 05:07), Max: 39.1 (2019 15:23)  T(F): 97.7 (2019 05:07), Max: 102.4 (2019 15:23)  HR: 73 (2019 05:07) (73 - 131)  BP: 95/57 (2019 05:07) (95/57 - 126/81)  BP(mean): --  ABP: --  ABP(mean): --  RR: 19 (2019 05:07) (17 - 19)  SpO2: 94% (2019 05:07) (94% - 98%)        CAPILLARY BLOOD GLUCOSE      POCT Blood Glucose.: 309 mg/dL (2019 23:57)      PHYSICAL EXAM: NAD  General: Awake, alert, oriented X 3.   HEENT: Atraumatic, normocephalic.                 Mallampatti Grade 2                No nasal congestion.                No tonsillar or pharyngeal exudates.  Lymph Nodes: No palpable lymphadenopathy  Neck: No JVD. No carotid bruit.   Respiratory: Normal chest expansion                         Normal percussion                         Normal and equal air entry                         + wheeze, rhonchi but no rales.  Cardiovascular: S1 S2 normal. + murmurs, rubs or gallops.   Abdomen: Soft, non-tender, non-distended. No organomegaly. Normoactive bowel sounds.  Extremities: Warm to touch. Peripheral pulse palpable. No pedal edema.   Skin: No rashes or skin lesions  Neurological: Motor and sensory examination equal and normal in all four extremities.  Psychiatry: Appropriate mood and affect.    HOSPITAL MEDICATIONS:  MEDICATIONS  (STANDING):  apixaban 5 milliGRAM(s) Oral every 12 hours  azithromycin  IVPB 500 milliGRAM(s) IV Intermittent every 24 hours  buDESOnide 160 MICROgram(s)/formoterol 4.5 MICROgram(s) Inhaler 2 Puff(s) Inhalation two times a day  cefepime   IVPB 2000 milliGRAM(s) IV Intermittent every 8 hours  dextrose 5%. 1000 milliLiter(s) (50 mL/Hr) IV Continuous <Continuous>  dextrose 50% Injectable 12.5 Gram(s) IV Push once  dextrose 50% Injectable 25 Gram(s) IV Push once  dextrose 50% Injectable 25 Gram(s) IV Push once  digoxin     Tablet 0.25 milliGRAM(s) Oral daily  docusate sodium 100 milliGRAM(s) Oral three times a day  fluticasone propionate 50 MICROgram(s)/spray Nasal Spray 1 Spray(s) Both Nostrils two times a day  influenza   Vaccine 0.5 milliLiter(s) IntraMuscular once  insulin glargine Injectable (LANTUS) 10 Unit(s) SubCutaneous at bedtime  insulin lispro (HumaLOG) corrective regimen sliding scale   SubCutaneous three times a day before meals  insulin lispro (HumaLOG) corrective regimen sliding scale   SubCutaneous at bedtime  insulin lispro Injectable (HumaLOG) 5 Unit(s) SubCutaneous three times a day before meals  methylPREDNISolone sodium succinate Injectable 20 milliGRAM(s) IV Push every 12 hours  montelukast 10 milliGRAM(s) Oral daily  pantoprazole    Tablet 40 milliGRAM(s) Oral before breakfast  polyethylene glycol 3350 17 Gram(s) Oral every 12 hours  senna 2 Tablet(s) Oral at bedtime  tiotropium 18 MICROgram(s) Capsule 1 Capsule(s) Inhalation daily  vancomycin  IVPB 1000 milliGRAM(s) IV Intermittent every 12 hours    MEDICATIONS  (PRN):  acetaminophen   Tablet .. 975 milliGRAM(s) Oral every 6 hours PRN Temp greater or equal to 38C (100.4F), Mild Pain (1 - 3), Moderate Pain (4 - 6)  ALBUTerol/ipratropium for Nebulization 3 milliLiter(s) Nebulizer every 6 hours PRN Shortness of Breath and/or Wheezing  dextrose 40% Gel 15 Gram(s) Oral once PRN Blood Glucose LESS THAN 70 milliGRAM(s)/deciliter  glucagon  Injectable 1 milliGRAM(s) IntraMuscular once PRN Glucose LESS THAN 70 milligrams/deciliter  guaiFENesin  milliGRAM(s) Oral every 12 hours PRN Cough  metoclopramide 5 milliGRAM(s) Oral three times a day PRN Nausea  simethicone 80 milliGRAM(s) Chew four times a day PRN Gas  traMADol 25 milliGRAM(s) Oral every 4 hours PRN Severe Pain (7 - 10)      LABS:                        13.2   16.3  )-----------( 269      ( 2019 16:27 )             40.7         137  |  98  |  10  ----------------------------<  225<H>  4.4   |  24  |  0.66    Ca    9.4      2019 16:28    TPro  7.6  /  Alb  4.0  /  TBili  0.3  /  DBili  x   /  AST  14  /  ALT  10  /  AlkPhos  91      PT/INR - ( 2019 16:27 )   PT: 15.5 sec;   INR: 1.35 ratio           Urinalysis Basic - ( 2019 18:14 )    Color: Yellow / Appearance: Clear / S.024 / pH: x  Gluc: x / Ketone: Negative  / Bili: Negative / Urobili: Negative   Blood: x / Protein: 30 mg/dL / Nitrite: Negative   Leuk Esterase: Negative / RBC: 8 /hpf / WBC 2 /HPF   Sq Epi: x / Non Sq Epi: 1 /hpf / Bacteria: Negative        Venous Blood Gas:   @ 16:39  7.43/40/54/27/85  VBG Lactate: 2.4      MICROBIOLOGY:     RADIOLOGY:  < from: CT Chest No Cont (19 @ 17:32) >    LUNGS AND LARGE AIRWAYS: Trace secretions within the bronchus   intermedius. There are unchangedscattered tree-in-bud opacities in the   right middle and lower lobes, some of which are calcified likely   secondary to chronic mucoid impaction. A 1.0 cm right basilar   pleural-based nodule is unchanged from 2018.    PLEURA: No pleural effusion.    VESSELS: Right chest port with the tip in the SVC.     HEART: Heart size is normal. No pericardial effusion. Aortic valve and   coronary artery calcifications.    MEDIASTINUM AND MARANDA: No lymphadenopathy.    CHEST WALL AND LOWER NECK: Within normal limits.    ABDOMEN AND PELVIS:    LIVER: Within normal limits.  BILE DUCTS: Normal caliber.  GALLBLADDER: Within normal limits.  SPLEEN: Within normal limits.    PANCREAS: Two exophytic cystic lesions measuring 2.2 cm and 1.8 cm in the   tail ofthe pancreas are unchanged dating back to CT dated 2017.     ADRENALS: Within normal limits.    KIDNEYS/URETERS: Parenchymal calcifications within the left kidney noted   are indeterminate.      BLADDER: Within normal limits.    REPRODUCTIVE ORGANS: Hysterectomy.    BOWEL: Status post distal proctocolectomy with left lower quadrant   colostomy. Grossly stable postoperative changes in the distal colectomy   considering differences in technique compared to 2018, however,   please note the bowel is suboptimally evaluated without oral and IV   contrast. No bowel obstruction. Appendix is not visualized.    PERITONEUM: No ascites.    VESSELS:  Atheromatous disease of the abdominal aorta.    RETROPERITONEUM: No lymphadenopathy.      ABDOMINAL WALL: Calcified injection granulomas in the left inner gluteal   soft tissues. Heterotopic ossification along the lower midline anterior   abdominal wall is unchanged.    BONES: Degenerative changes of the spine. Sclerotic lesions in the T4,   T8, and T11 vertebral bodies are unchanged. Status post fixation of the   pubic symphysis and right sacroiliac joint.      IMPRESSION:   No interval change compared to prior exam of 2018, with details as   above.                 < end of copied text >    [ ] Reviewed and interpreted by me    Point of Care Ultrasound Findings:    PFT:    EKG:

## 2019-01-31 ENCOUNTER — TRANSCRIPTION ENCOUNTER (OUTPATIENT)
Age: 69
End: 2019-01-31

## 2019-01-31 ENCOUNTER — APPOINTMENT (OUTPATIENT)
Dept: THORACIC SURGERY | Facility: CLINIC | Age: 69
End: 2019-01-31

## 2019-01-31 LAB
ANION GAP SERPL CALC-SCNC: 9 MMOL/L — SIGNIFICANT CHANGE UP (ref 5–17)
BASOPHILS # BLD AUTO: 0.01 K/UL — SIGNIFICANT CHANGE UP (ref 0–0.2)
BASOPHILS NFR BLD AUTO: 0.1 % — SIGNIFICANT CHANGE UP (ref 0–2)
BUN SERPL-MCNC: 12 MG/DL — SIGNIFICANT CHANGE UP (ref 7–23)
CALCIUM SERPL-MCNC: 8.6 MG/DL — SIGNIFICANT CHANGE UP (ref 8.4–10.5)
CHLORIDE SERPL-SCNC: 106 MMOL/L — SIGNIFICANT CHANGE UP (ref 96–108)
CMV DNA CSF QL NAA+PROBE: SIGNIFICANT CHANGE UP
CMV DNA SPEC NAA+PROBE-LOG#: SIGNIFICANT CHANGE UP LOGIU/ML
CO2 SERPL-SCNC: 27 MMOL/L — SIGNIFICANT CHANGE UP (ref 22–31)
CREAT SERPL-MCNC: 0.5 MG/DL — SIGNIFICANT CHANGE UP (ref 0.5–1.3)
CRYPTOC AG FLD QL: NEGATIVE — SIGNIFICANT CHANGE UP
EOSINOPHIL # BLD AUTO: 0.03 K/UL — SIGNIFICANT CHANGE UP (ref 0–0.5)
EOSINOPHIL NFR BLD AUTO: 0.2 % — SIGNIFICANT CHANGE UP (ref 0–6)
GLUCOSE BLDC GLUCOMTR-MCNC: 183 MG/DL — HIGH (ref 70–99)
GLUCOSE BLDC GLUCOMTR-MCNC: 208 MG/DL — HIGH (ref 70–99)
GLUCOSE BLDC GLUCOMTR-MCNC: 219 MG/DL — HIGH (ref 70–99)
GLUCOSE BLDC GLUCOMTR-MCNC: 232 MG/DL — HIGH (ref 70–99)
GLUCOSE SERPL-MCNC: 232 MG/DL — HIGH (ref 70–99)
HCT VFR BLD CALC: 31.7 % — LOW (ref 34.5–45)
HCT VFR BLD CALC: 32.4 % — LOW (ref 34.5–45)
HGB BLD-MCNC: 10 G/DL — LOW (ref 11.5–15.5)
HGB BLD-MCNC: 10.5 G/DL — LOW (ref 11.5–15.5)
IMM GRANULOCYTES NFR BLD AUTO: 0.3 % — SIGNIFICANT CHANGE UP (ref 0–1.5)
LEGIONELLA AG UR QL: NEGATIVE — SIGNIFICANT CHANGE UP
LYMPHOCYTES # BLD AUTO: 1.35 K/UL — SIGNIFICANT CHANGE UP (ref 1–3.3)
LYMPHOCYTES # BLD AUTO: 8.5 % — LOW (ref 13–44)
MCHC RBC-ENTMCNC: 22.2 PG — LOW (ref 27–34)
MCHC RBC-ENTMCNC: 22.8 PG — LOW (ref 27–34)
MCHC RBC-ENTMCNC: 31.5 GM/DL — LOW (ref 32–36)
MCHC RBC-ENTMCNC: 32.2 GM/DL — SIGNIFICANT CHANGE UP (ref 32–36)
MCV RBC AUTO: 70.4 FL — LOW (ref 80–100)
MCV RBC AUTO: 70.7 FL — LOW (ref 80–100)
MONOCYTES # BLD AUTO: 0.93 K/UL — HIGH (ref 0–0.9)
MONOCYTES NFR BLD AUTO: 5.9 % — SIGNIFICANT CHANGE UP (ref 2–14)
NEUTROPHILS # BLD AUTO: 13.47 K/UL — HIGH (ref 1.8–7.4)
NEUTROPHILS NFR BLD AUTO: 85 % — HIGH (ref 43–77)
PLATELET # BLD AUTO: 211 K/UL — SIGNIFICANT CHANGE UP (ref 150–400)
PLATELET # BLD AUTO: 220 K/UL — SIGNIFICANT CHANGE UP (ref 150–400)
POTASSIUM SERPL-MCNC: 4.2 MMOL/L — SIGNIFICANT CHANGE UP (ref 3.5–5.3)
POTASSIUM SERPL-SCNC: 4.2 MMOL/L — SIGNIFICANT CHANGE UP (ref 3.5–5.3)
RBC # BLD: 4.5 M/UL — SIGNIFICANT CHANGE UP (ref 3.8–5.2)
RBC # BLD: 4.58 M/UL — SIGNIFICANT CHANGE UP (ref 3.8–5.2)
RBC # FLD: 16.3 % — HIGH (ref 10.3–14.5)
RBC # FLD: 17.8 % — HIGH (ref 10.3–14.5)
SODIUM SERPL-SCNC: 142 MMOL/L — SIGNIFICANT CHANGE UP (ref 135–145)
WBC # BLD: 15.84 K/UL — HIGH (ref 3.8–10.5)
WBC # BLD: 16.7 K/UL — HIGH (ref 3.8–10.5)
WBC # FLD AUTO: 15.84 K/UL — HIGH (ref 3.8–10.5)
WBC # FLD AUTO: 16.7 K/UL — HIGH (ref 3.8–10.5)

## 2019-01-31 PROCEDURE — 99232 SBSQ HOSP IP/OBS MODERATE 35: CPT

## 2019-01-31 RX ORDER — INSULIN LISPRO 100/ML
8 VIAL (ML) SUBCUTANEOUS
Qty: 0 | Refills: 0 | Status: DISCONTINUED | OUTPATIENT
Start: 2019-01-31 | End: 2019-02-06

## 2019-01-31 RX ORDER — ALPRAZOLAM 0.25 MG
0.25 TABLET ORAL ONCE
Qty: 0 | Refills: 0 | Status: DISCONTINUED | OUTPATIENT
Start: 2019-01-31 | End: 2019-01-31

## 2019-01-31 RX ORDER — METRONIDAZOLE 500 MG
500 TABLET ORAL
Qty: 0 | Refills: 0 | Status: DISCONTINUED | OUTPATIENT
Start: 2019-01-31 | End: 2019-02-05

## 2019-01-31 RX ORDER — IPRATROPIUM BROMIDE 21 MCG
1 AEROSOL, SPRAY (ML) NASAL THREE TIMES A DAY
Qty: 0 | Refills: 0 | Status: DISCONTINUED | OUTPATIENT
Start: 2019-01-31 | End: 2019-02-06

## 2019-01-31 RX ADMIN — Medication 100 MILLIGRAM(S): at 14:53

## 2019-01-31 RX ADMIN — CEFEPIME 100 MILLIGRAM(S): 1 INJECTION, POWDER, FOR SOLUTION INTRAMUSCULAR; INTRAVENOUS at 06:56

## 2019-01-31 RX ADMIN — BUDESONIDE AND FORMOTEROL FUMARATE DIHYDRATE 2 PUFF(S): 160; 4.5 AEROSOL RESPIRATORY (INHALATION) at 17:16

## 2019-01-31 RX ADMIN — Medication 1 SPRAY(S): at 21:21

## 2019-01-31 RX ADMIN — Medication 4: at 17:13

## 2019-01-31 RX ADMIN — APIXABAN 5 MILLIGRAM(S): 2.5 TABLET, FILM COATED ORAL at 06:59

## 2019-01-31 RX ADMIN — POLYETHYLENE GLYCOL 3350 17 GRAM(S): 17 POWDER, FOR SOLUTION ORAL at 06:57

## 2019-01-31 RX ADMIN — INSULIN GLARGINE 15 UNIT(S): 100 INJECTION, SOLUTION SUBCUTANEOUS at 22:20

## 2019-01-31 RX ADMIN — Medication 3 MILLILITER(S): at 06:59

## 2019-01-31 RX ADMIN — CEFEPIME 100 MILLIGRAM(S): 1 INJECTION, POWDER, FOR SOLUTION INTRAMUSCULAR; INTRAVENOUS at 21:21

## 2019-01-31 RX ADMIN — Medication 5 UNIT(S): at 08:18

## 2019-01-31 RX ADMIN — Medication 1 SPRAY(S): at 14:53

## 2019-01-31 RX ADMIN — Medication 100 MILLIGRAM(S): at 06:59

## 2019-01-31 RX ADMIN — PANTOPRAZOLE SODIUM 40 MILLIGRAM(S): 20 TABLET, DELAYED RELEASE ORAL at 06:59

## 2019-01-31 RX ADMIN — Medication 100 MILLIGRAM(S): at 21:21

## 2019-01-31 RX ADMIN — Medication 0.25 MILLIGRAM(S): at 07:01

## 2019-01-31 RX ADMIN — SENNA PLUS 2 TABLET(S): 8.6 TABLET ORAL at 21:21

## 2019-01-31 RX ADMIN — MONTELUKAST 10 MILLIGRAM(S): 4 TABLET, CHEWABLE ORAL at 11:54

## 2019-01-31 RX ADMIN — Medication 0.25 MILLIGRAM(S): at 23:26

## 2019-01-31 RX ADMIN — TIOTROPIUM BROMIDE 1 CAPSULE(S): 18 CAPSULE ORAL; RESPIRATORY (INHALATION) at 11:53

## 2019-01-31 RX ADMIN — Medication 4: at 08:17

## 2019-01-31 RX ADMIN — BUDESONIDE AND FORMOTEROL FUMARATE DIHYDRATE 2 PUFF(S): 160; 4.5 AEROSOL RESPIRATORY (INHALATION) at 07:00

## 2019-01-31 RX ADMIN — Medication 8 UNIT(S): at 17:14

## 2019-01-31 RX ADMIN — Medication 500 MILLIGRAM(S): at 17:23

## 2019-01-31 RX ADMIN — Medication 5 MILLIGRAM(S): at 17:26

## 2019-01-31 RX ADMIN — POLYETHYLENE GLYCOL 3350 17 GRAM(S): 17 POWDER, FOR SOLUTION ORAL at 17:15

## 2019-01-31 RX ADMIN — Medication 4: at 12:35

## 2019-01-31 RX ADMIN — Medication 20 MILLIGRAM(S): at 06:59

## 2019-01-31 RX ADMIN — Medication 20 MILLIGRAM(S): at 17:15

## 2019-01-31 RX ADMIN — APIXABAN 5 MILLIGRAM(S): 2.5 TABLET, FILM COATED ORAL at 17:16

## 2019-01-31 RX ADMIN — CEFEPIME 100 MILLIGRAM(S): 1 INJECTION, POWDER, FOR SOLUTION INTRAMUSCULAR; INTRAVENOUS at 11:58

## 2019-01-31 NOTE — DISCHARGE NOTE ADULT - REASON FOR ADMISSION
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

## 2019-01-31 NOTE — DISCHARGE NOTE ADULT - NSTOBACCOHOTLINE_GEN_A_NCS
HPI Comments: Pt presents with c/o right third finger pain x 2-3d. Unsure if she hit it against something but pain much worse w/flexion at the MCP joint. Hurts at the proximal phalanx. Denies skin changes, swelling, numbness, tingling. Right hand dominant. Pain is constant, not worsening, mild-moderate. Patient is a 50 y.o. female presenting with finger pain. The history is provided by the patient. Finger Pain    This is a new problem. The current episode started 2 days ago. The problem occurs constantly. The problem has not changed since onset. The quality of the pain is described as pounding. The pain is at a severity of 3/10. The pain is mild. Associated symptoms include limited range of motion and stiffness. Pertinent negatives include no numbness, no tingling, no itching, no back pain and no neck pain. The symptoms are aggravated by movement. She has tried nothing for the symptoms. The treatment provided no relief. History of extremity trauma: unsure. Past Medical History:   Diagnosis Date    Arthritis     Asthma     Bilateral leg pain     Carpal tunnel syndrome, right     symptoms    DDD (degenerative disc disease), lumbar     mild    Eczema     Lower back pain     Migraines     Myalgia     lumbar    Numbness and tingling in right hand     Sciatica     right lower extremity       Past Surgical History:   Procedure Laterality Date    HX PARTIAL HYSTERECTOMY      HX TONSILLECTOMY      HX TUBAL LIGATION           Family History:   Problem Relation Age of Onset    Hypertension Other     Asthma Other     Hypertension Mother    Vello Ganser Arthritis-osteo Mother        Social History     Social History    Marital status:      Spouse name: N/A    Number of children: N/A    Years of education: N/A     Occupational History    Not on file.      Social History Main Topics    Smoking status: Former Smoker     Quit date: 3/1/2012    Smokeless tobacco: Never Used      Comment: less than a pack a day. Current note from 4-1-15 states patient is a non-smoker    Alcohol use No    Drug use: Yes     Special: Cocaine      Comment: last uses 1    Sexual activity: Not on file     Other Topics Concern    Not on file     Social History Narrative         ALLERGIES: Egg; Ibuprofen; Darvocet a500 [propoxyphene n-acetaminophen]; and Fish containing products    Review of Systems   Musculoskeletal: Positive for arthralgias and stiffness. Negative for back pain and neck pain. Skin: Negative for itching. Neurological: Negative for tingling and numbness. Vitals:    11/13/17 1336   BP: 109/65   Pulse: 100   Resp: 20   Temp: 98 °F (36.7 °C)   SpO2: 96%   Weight: 90.7 kg (200 lb)   Height: 5' 5\" (1.651 m)            Physical Exam   Constitutional: She is oriented to person, place, and time. She appears well-developed. HENT:   Head: Normocephalic and atraumatic. Eyes: Pupils are equal, round, and reactive to light. Neck: No JVD present. No tracheal deviation present. No thyromegaly present. Cardiovascular: Normal rate, regular rhythm and normal heart sounds. Exam reveals no gallop and no friction rub. No murmur heard. Pulmonary/Chest: Effort normal and breath sounds normal. No stridor. No respiratory distress. She has no wheezes. She has no rales. She exhibits no tenderness. Abdominal: Soft. She exhibits no distension and no mass. There is no tenderness. There is no rebound and no guarding. Musculoskeletal: She exhibits tenderness. She exhibits no edema. Right third finger: discomfort w/flexion. TTP of the proximal palmar aspect. No mcp or PIP jt TTP. Cap refill <2 sec. Sensation intact throughout. Lymphadenopathy:     She has no cervical adenopathy. Neurological: She is alert and oriented to person, place, and time. Skin: Skin is warm and dry. No rash noted. No erythema. No pallor. Psychiatric: She has a normal mood and affect.  Her behavior is normal. Thought content normal. Nursing note and vitals reviewed. MDM  Number of Diagnoses or Management Options  Finger pain, right:   Diagnosis management comments: Differential: OA; gout; tendinitis; fx; dislocation    Nothing acute on film. Splint for comfort and treat pain (has tried Vicodini before) and refer to ortho. Splint applied by nurse to right third finger; excellent position. N/v intact before and after application. Amount and/or Complexity of Data Reviewed  Tests in the radiology section of CPT®: ordered and reviewed      ED Course       Procedures    2:44 PM  Diagnosis:   1. Finger pain, right          Disposition: home    Follow-up Information     Follow up With Details Comments Contact Boom Krueger MD Schedule an appointment as soon as possible for a visit in 2 days As needed [address] Schedule an appointment as soon as possible for a visit in 3 days As needed [address] [place] [address] DEPT  If symptoms worsen return immediately [facility]  [phone]          Patient's Medications   Start Taking    HYDROCODONE-ACETAMINOPHEN (NORCO) 5-325 MG PER TABLET    Take 1-2 tablets PO every 4-6 hours as needed for pain control. If over the counter ibuprofen or acetaminophen was suggested, then only take the vicodin for pain not well controlled with the over the counter medication. Continue Taking    ALBUTEROL (PROVENTIL HFA, VENTOLIN HFA, PROAIR HFA) 90 MCG/ACTUATION INHALER    Take 2 Puffs by inhalation every six (6) hours as needed for Wheezing. ESCITALOPRAM OXALATE (LEXAPRO) 20 MG TABLET    Take 20 mg by mouth daily. HYDROXYZINE HCL (ATARAX) 50 MG TABLET    Take 50 mg by mouth three (3) times daily as needed for Itching. MIRTAZAPINE (REMERON) 30 MG TABLET    Take 30 mg by mouth nightly.    These Medications have changed    No medications on file   Stop Taking    CIPROFLOXACIN HCL (CILOXAN) 0.3 % OPHTHALMIC SOLUTION    1 drop in right eye qid x 7 days    HYDROCODONE-ACETAMINOPHEN (NORCO) 5-325 MG PER TABLET    Take 1-2 Tabs by mouth every six (6) hours as needed for Pain. Max Daily Amount: 8 Tabs. [facility] Smokers Quitline ([id number])

## 2019-01-31 NOTE — CHART NOTE - NSCHARTNOTEFT_GEN_A_CORE
Notified by RN that patient c/o of bleeding from wound on buttocks. Patient seen and examined at bedside. Patient states she had hx of rectal fistula and area has been draining throughout the day. States she urinated and drainage increased and noticed blood. Draining of serosanguinous fluid noted in intergluteal cleft. Patient denies pain at site, dizziness, SOB, headaches, CP.     Wound care consult pending   Dry dressing   Culture of fluid ordered   CT A/P ordered to look for possible fluid collections   Stat CBC ordered : H/H: 10.5 / 32.4   Can consider surgery consult   Will continue to monitor closely     Will endorse to day team.     Peggy Rausch PA-C   [id number] Notified by RN that patient c/o of bleeding from wound on buttocks. Patient seen and examined at bedside. Patient states she had hx of rectal fistula s/p repair and area has been draining throughout the day. States she urinated and drainage increased and noticed blood. Draining of serosanguinous fluid noted in intergluteal cleft. Patient denies pain at site, dizziness, SOB, headaches, CP.     Rectal Wound - r/o fistula / abscess   Wound care consult pending   Dry dressing   Culture of fluid ordered   CT A/P ordered to look for possible fluid collections   Stat CBC ordered : H/H: 10.5 / 32.4   Can consider surgery consult   Will continue to monitor closely     Will endorse to day team.     Peggy Rausch PA-C   [id number] Notified by RN that patient c/o of bleeding from wound on buttocks. Patient seen and examined at bedside. Patient states she had hx of rectal fistula s/p repair and area has been draining throughout the day. States she urinated and drainage increased and noticed blood. Draining of serosanguinous fluid noted in intergluteal cleft. Patient denies pain at site, dizziness, SOB, headaches, CP.     Rectal Wound - r/o fistula / abscess   Wound care consult pending   Dry dressing   Culture of fluid ordered   CT A/P ordered to look for possible fluid collections   Stat CBC ordered : H/H: 10.5 / 32.4   Can consider surgery consult   Will continue to monitor closely     Will endorse to day team.     Peggy Rausch PA-C   [id number]    Addendum:   Patient states she has allergy to iodine and has to be pre-medicated. CT scan postponed and will f/u with day team.     Peggy Rausch PA-C  [id number]

## 2019-01-31 NOTE — PROGRESS NOTE ADULT - SUBJECTIVE AND OBJECTIVE BOX
INFECTIOUS DISEASES FOLLOW UP--Sergio Lai MD  Pager [phone]    This is a follow up note for this  68y Female with  fever that has resolved.  There has been timi-anal drainage---blood/seropurulent.  No rectal or buttocks pain.    Further ROS:  CONSTITUTIONAL:  No fever, good appetite  CARDIOVASCULAR:  No chest pain or palpitations  RESPIRATORY:  No dyspnea  GASTROINTESTINAL:  No nausea, vomiting, diarrhea, or abdominal pain  GENITOURINARY:  No dysuria  NEUROLOGIC:  No headache,     Allergies    ampicillin (Short breath)  aspirin (Short breath)  Avelox (Short breath; Pruritus)  codeine (Short breath)  Dilaudid (Short breath)  iodine (Short breath; Swelling)  penicillin (Short breath)  shellfish (Anaphylaxis)  tetanus toxoid (Short breath)  Valium (Short breath)    ANTIBIOTICS/RELEVANT:  antimicrobials  azithromycin  IVPB 500 milliGRAM(s) IV Intermittent every 24 hours  cefepime   IVPB 2000 milliGRAM(s) IV Intermittent every 8 hours    immunologic:  influenza   Vaccine 0.5 milliLiter(s) IntraMuscular once    OTHER:  acetaminophen   Tablet .. 975 milliGRAM(s) Oral every 6 hours PRN  ALBUTerol/ipratropium for Nebulization 3 milliLiter(s) Nebulizer every 6 hours PRN  apixaban 5 milliGRAM(s) Oral every 12 hours  buDESOnide 160 MICROgram(s)/formoterol 4.5 MICROgram(s) Inhaler 2 Puff(s) Inhalation two times a day  dextrose 40% Gel 15 Gram(s) Oral once PRN  dextrose 5%. 1000 milliLiter(s) IV Continuous <Continuous>  dextrose 50% Injectable 12.5 Gram(s) IV Push once  dextrose 50% Injectable 25 Gram(s) IV Push once  dextrose 50% Injectable 25 Gram(s) IV Push once  digoxin     Tablet 0.25 milliGRAM(s) Oral daily  docusate sodium 100 milliGRAM(s) Oral three times a day  fluticasone propionate 50 MICROgram(s)/spray Nasal Spray 1 Spray(s) Both Nostrils two times a day  glucagon  Injectable 1 milliGRAM(s) IntraMuscular once PRN  guaiFENesin  milliGRAM(s) Oral every 12 hours PRN  insulin glargine Injectable (LANTUS) 15 Unit(s) SubCutaneous at bedtime  insulin lispro (HumaLOG) corrective regimen sliding scale   SubCutaneous three times a day before meals  insulin lispro (HumaLOG) corrective regimen sliding scale   SubCutaneous at bedtime  insulin lispro Injectable (HumaLOG) 5 Unit(s) SubCutaneous three times a day before meals  ipratropium 42 MICROgram(s) Nasal 1 Spray(s) Nasal three times a day  methylPREDNISolone sodium succinate Injectable 20 milliGRAM(s) IV Push every 12 hours  metoclopramide 5 milliGRAM(s) Oral three times a day PRN  montelukast 10 milliGRAM(s) Oral daily  pantoprazole    Tablet 40 milliGRAM(s) Oral before breakfast  polyethylene glycol 3350 17 Gram(s) Oral every 12 hours  senna 2 Tablet(s) Oral at bedtime  simethicone 80 milliGRAM(s) Chew four times a day PRN  tiotropium 18 MICROgram(s) Capsule 1 Capsule(s) Inhalation daily  traMADol 25 milliGRAM(s) Oral every 4 hours PRN    Objective:  Vital Signs Last 24 Hrs  T(C): 36.5 (2019 04:01), Max: 36.9 (2019 15:37)  T(F): 97.7 (2019 04:01), Max: 98.4 (2019 15:37)  HR: 67 (2019 04:01) (67 - 83)  BP: 115/68 (2019 04:01) (112/71 - 132/71)  BP(mean): --  RR: 17 (2019 04:01) (17 - 19)  SpO2: 99% (2019 04:01) (98% - 99%)    PHYSICAL EXAM:  R chest port site ok  Constitutional:no acute distress  Eyes:EBER, EOMI  Ear/Nose/Throat: no oral lesions, 	  Respiratory: clear BL  Cardiovascular: S1S2  Gastrointestinal:soft, (+) BS, no tenderness  Extremities:no e/e/c  No Lymphadenopathy  IV sites not inflammed.    LABS:                        10.5   16.7  )-----------( 211      ( 2019 01:11 )             32.4     -31    142  |  106  |  12  ----------------------------<  232<H>  4.2   |  27  |  0.50    Ca    8.6      2019 07:08  Phos  2.9     01-30  Mg     2.1         TPro  7.6  /  Alb  4.0  /  TBili  0.3  /  DBili  x   /  AST  14  /  ALT  10  /  AlkPhos  91      PT/INR - ( 2019 16:27 )   PT: 15.5 sec;   INR: 1.35 ratio      Urinalysis Basic - ( 2019 18:14 )    Color: Yellow / Appearance: Clear / S.024 / pH: x  Gluc: x / Ketone: Negative  / Bili: Negative / Urobili: Negative   Blood: x / Protein: 30 mg/dL / Nitrite: Negative   Leuk Esterase: Negative / RBC: 8 /hpf / WBC 2 /HPF   Sq Epi: x / Non Sq Epi: 1 /hpf / Bacteria: Negative    Imp/Rx:  Fevers.  Resp symptoms not very impressive all things considered and chest ct not worse.  In view of the perianal drainage I wonder if there is a perianal abscess, now draining that explains the fevers and leukocytosis.  I think caution should be exerted with regard to steroids.  Please arrange surgical follow up.  chas hyde and add flagyl 500 mg po bid to the cefepime

## 2019-01-31 NOTE — DISCHARGE NOTE ADULT - PLAN OF CARE
Improving suspect that source was likely the perianal area  MRI Abdomen/Pelvis showing Peripherally enhancing collection in the perianal region, slightly to the left of midline may correlate with the reported draining perineal sinus.   Recommend continuing daily gentle packing of sinus tract in order to promote drainage.   - Please have patient follow up with Dr. Luong within one week of discharge (included in Discharge Note). Call your Health Care provider upon arrival home to make a follow up appointment within one week.  Take all inhalers as prescribed by your Health Care Provider.  Take steroids as prescribed by your Health Care Provider.  If your cough increases infrequency and severity and/or you have shortness of breath or increased shortness of breath call your Health Care Provider.  If you develop fever, chills, night sweats, malaise, and/or change in mental status call your Health care Provider.  Nutrition is very important.  Eat small frequent meals.  Increase your activity as tolerated.  Do not stay in bed all day Seen by ophthalmology, attribute to a vest she was using for chest PT.  Patient has stopped using the vest. HgA1C this admission. 8.3  Make sure you get your HgA1c checked every three months.  If you take oral diabetes medications, check your blood glucose two times a day.  If you take insulin, check your blood glucose before meals and at bedtime.  It's important not to skip any meals.  Keep a log of your blood glucose results and always take it with you to your doctor appointments.  Keep a list of your current medications including injectables and over the counter medications and bring this medication list with you to all your doctor appointments.  If you have not seen your opthalmologist this year call for appointment.  Check your feet daily for redness, sores, or openings. Do not self treat. If no improvement in two days call your primary care physician for an appointment.  Low blood sugar (hypoglycemia) is a blood sugar below 70mg/dl. Check your blood sugar if you feel signs/symptoms of hypoglycemia. If your blood sugar is below 70 take 15 grams of carbohydrates (ex 4 oz of apple juice, 3-4 glucosr tablets, or 4-6 oz of regular soda) wait 15 minutes and repeat blood sugar to make sure it comes up above 70.  If your blood sugar is above 70 and you are due for a meal, have a meal.  If you are not due for a meal have a snack.  This snack helps keeps your blood sugar at a safe range.  Continue to monitor blood sugars. Will need slow taper to home dose (4mg daily). continue on Eliquis please follow up with Dr. frazier in one week  Continue with steroid at 24 mg daily for one week  Abnormal CT scan, chest. Plan: no changes from 12/18--ct f/up in 3  months for nodule.  symbicort, spiriva, singulair-- acapella to continue                 Asthma-on medrol 32mg per day can reduce to 24mg, symbicort, spiriva, singulair-- acapella to continue suspect that source was likely the perianal area  MRI Abdomen/Pelvis showing Peripherally enhancing collection in the perianal region, slightly to the left of midline may correlate with the reported draining perineal sinus.   Recommend continuing daily gentle packing of sinus tract in order to promote drainage.   - Please have patient follow up with Dr. Luong within one week of discharge (included in Discharge Note).  dressing caqre w/ 1/4 inch plain packing to sinus tract, cover w/ gauze, Allevyn and Tegaderm at bottom of wound Call your Health Care provider upon arrival home to make a follow up appointment within one week.  Take all inhalers as prescribed by your Health Care Provider.  Take steroids as prescribed by your Health Care Provider.  If your cough increases infrequency and severity and/or you have shortness of breath or increased shortness of breath call your Health Care Provider.  If you develop fever, chills, night sweats, malaise, and/or change in mental status call your Health care Provider.  Nutrition is very important.  Eat small frequent meals.  Increase your activity as tolerated.  Do not stay in bed all day  Medrol 24 mg daily w/ food - you need to see Dr. Allison in one week Will need slow taper to home dose . Atrial fibrillation is the most common heart rhythm problem & has the risk of stroke & heart attack  It helps if you control your blood pressure, not drink more than 1-2 alcohol drinks per day, cut down on caffeine, getting treatment for over active thyroid gland, & getting exercise  Call your doctor if you feel your heart racing or beating unusually, chest tightness or pain, lightheaded, faint, shortness of breath especially with exercise  It is important to take your heart medication as prescribed  You are on Eliquis for anticoagulation and stroke prevention  Eliquis/Apixaban is used to thin the blood so clots will not form and to keep existing ones from getting bigger.  Take this medication daily as prescribed by your health care provider.  Take this medication with food to prevent upset stomach.  If you miss a dose call your health care provider or pharmacist right away.  Tell your doctor you use this drug before you have a spinal or epidural procedure  Tell dentists, surgeon, and other doctors that you use this drug.  You may bleed more easily.  Be careful and avoid injury.  Use a soft toothbrush and an electric razor. please follow up with Dr. frazier in one week  Continue with steroid at 24 mg daily for one week until you see Dr. Frazier  Abnormal CT scan, chest. Plan: no changes from 12/18--ct f/up in 3  months for nodule.  symbicort, spiriva, singulair-- acapella to continue                 Asthma-on medrol 32mg per day can reduce to 24mg, symbicort, spiriva, singulair-- acapella to continue

## 2019-01-31 NOTE — DISCHARGE NOTE ADULT - NS AS DC FOLLOWUP STROKE INST
Stroke (includes: TIA/SAH/ICH/Ischemic Stroke)/Influenza vaccination (VIS Pub Date: August 7, 2015)/Pneumonia/Smoking Cessation Smoking Cessation/Influenza vaccination (VIS Pub Date: August 7, 2015)/Pneumonia Influenza vaccination (VIS Pub Date: August 7, 2015)/Pneumonia

## 2019-01-31 NOTE — PROGRESS NOTE ADULT - ASSESSMENT
68 yr F with a PMH of severe asthma, bronchiectasis, tracheobronchomalacia s/p tracheo-bronchoplasty in 10/16 on chronic low dose Prednisone, Afib on Eliquis, DM2, HTN, Squamous cell CA of the anus on chemo/XRT, s/p abdominoperineal proctectomy for recurrent exophytic anal cancer in 7/18, recent admission to [facility] for acute hypoxic resp failure, coronavirus PNA, discharged with steroid taper who presents with fever x1 day. as per patient has "no idea where it is coming from". States SOB, wheeze and cough at baseline, not expectorating. +mild burning when urinating and pelvic fullness, discomfort. Status ostomy output is unchanged. Denies chest pain or abdominal pain otherwise. +Nausea with 1 episode of emesis this morning.     A/P: Fever for 1 day, 102 in ED. Mild leukocytosis, VBG WNL.  Otherwise nontoxic appearing.   Endorses suprapubic tenderness, dysuria, frequency - likely UTI.  Respiratory symptoms and exam at baseline  Has received Vanc, Cefepime and Azithro as well as Solumedrol and NS bolus x2 L so far.  c/w steroids,  Symbicort, Duonebs, Spiriva, Singulair, Flonase  CT chest no PNA; no change from prior ct    See below:: 1/31- ID--DC vanco, check crypt blood work etc.

## 2019-01-31 NOTE — DISCHARGE NOTE ADULT - MEDICATION SUMMARY - MEDICATIONS TO TAKE
I will START or STAY ON the medications listed below when I get home from the hospital:    Physical therapy for pulmonary rehab  -- Indication: For Physical therapy     methylPREDNISolone 8 mg oral tablet  -- 3 tab(s) by mouth once a day    -- Indication: For Steroid     acetaminophen 325 mg oral tablet  -- 2 tab(s) by mouth every 6 hours, As needed, Moderate Pain (4 - 6)  -- Indication: For Pain    traMADol 50 mg oral tablet  -- 0.5 tab(s) by mouth every 4 hours, As needed, Severe Pain (7 - 10)  -- Indication: For Pain     digoxin 250 mcg (0.25 mg) oral tablet  -- 1 tab(s) by mouth once a day  -- Indication: For Atrial fibrillation    apixaban 5 mg oral tablet  -- 1 tab(s) by mouth every 12 hours  -- Indication: For Atrial fibrillation    insulin glargine  -- 10 unit(s) subcutaneous once a day (at bedtime)  -- Indication: For T2DM (type 2 diabetes mellitus)    insulin lispro  -- 5 unit(s) subcutaneous 3 times a day (with meals)  -- Indication: For T2DM (type 2 diabetes mellitus)    metoclopramide 5 mg oral tablet  -- 1 tab(s) by mouth 3 times a day, As needed, Nausea  -- Indication: For Antiemetic     budesonide-formoterol 160 mcg-4.5 mcg/inh inhalation aerosol  -- 2 puff(s) inhaled 2 times a day  -- Indication: For COPD exacerbation    ipratropium-albuterol 0.5 mg-2.5 mg/3 mLinhalation solution  -- 3 milliliter(s) inhaled every 6 hours  -- Indication: For COPD exacerbation    tiotropium 18 mcg inhalation capsule  -- 1 cap(s) inhaled once a day  -- Indication: For COPD exacerbation    guaiFENesin 600 mg oral tablet, extended release  -- 1 tab(s) by mouth every 12 hours  -- Indication: For COugh    polyethylene glycol 3350 oral powder for reconstitution  -- 17 gram(s) by mouth every 12 hours  -- Indication: For COnstipaton     senna oral tablet  -- 2 tab(s) by mouth once a day (at bedtime)  -- Indication: For COnstipation     docusate sodium 100 mg oral capsule  -- 1 cap(s) by mouth 3 times a day  -- Indication: For COnstipation     Singulair 10 mg oral tablet  -- 1 tab(s) by mouth once a day  -- Indication: For Allergies    montelukast 10 mg oral tablet  -- 1 tab(s) by mouth once a day   -- It is very important that you take or use this exactly as directed.  Do not skip doses or discontinue unless directed by your doctor.    -- Indication: For Allergies     simethicone 80 mg oral tablet, chewable  -- 2 tab(s) by mouth 4 times a day, As needed, Gas  -- Indication: For Flatulence     fluticasone 50 mcg/inh nasal spray  -- 1 spray(s) into nose 2 times a day  -- Indication: For COPD exacerbation    Protonix 40 mg oral delayed release tablet  -- 1 tab(s) by mouth once a day  -- Indication: For GERD I will START or STAY ON the medications listed below when I get home from the hospital:    Physical therapy for pulmonary rehab  -- Indication: For Physical therapy     methylPREDNISolone 8 mg oral tablet  -- 3 tab(s) by mouth once a day w/ breakfast - you need to see Dr. Allison in 1 week    -- Indication: For COPD exacerbation    traMADol 50 mg oral tablet  -- 0.5 tab(s) by mouth every 4 hours, As needed, Severe Pain (7 - 10)  -- Indication: For Pain     acetaminophen 325 mg oral tablet  -- 2 tab(s) by mouth every 6 hours, As needed, Moderate Pain (4 - 6)  -- Indication: For Pain    digoxin 250 mcg (0.25 mg) oral tablet  -- 1 tab(s) by mouth once a day  -- Indication: For Atrial fibrillation    apixaban 5 mg oral tablet  -- 1 tab(s) by mouth every 12 hours  -- Indication: For Atrial fibrillation & stroke prevention    insulin lispro  -- 5 unit(s) subcutaneous 3 times a day (with meals) - hold if not eating  -- Indication: For Diabetes    insulin glargine  -- 10 unit(s) subcutaneous once a day (at bedtime)  -- Indication: For T2DM (type 2 diabetes mellitus)    metoclopramide 5 mg oral tablet  -- 1 tab(s) by mouth 3 times a day, As needed, Nausea  -- Indication: For Antiemetic     budesonide-formoterol 160 mcg-4.5 mcg/inh inhalation aerosol  -- 2 puff(s) inhaled 2 times a day  -- Indication: For COPD exacerbation    tiotropium 18 mcg inhalation capsule  -- 1 cap(s) inhaled once a day  -- Indication: For COPD exacerbation    ipratropium-albuterol 0.5 mg-2.5 mg/3 mLinhalation solution  -- 3 milliliter(s) inhaled every 6 hours  -- Indication: For COPD exacerbation    guaiFENesin 600 mg oral tablet, extended release  -- 1 tab(s) by mouth every 12 hours  -- Indication: For COugh    polyethylene glycol 3350 oral powder for reconstitution  -- 17 gram(s) by mouth every 12 hours  -- Indication: For COnstipaton     senna oral tablet  -- 2 tab(s) by mouth once a day (at bedtime)  -- Indication: For COnstipation     docusate sodium 100 mg oral capsule  -- 1 cap(s) by mouth 3 times a day  -- Indication: For COnstipation     montelukast 10 mg oral tablet  -- 1 tab(s) by mouth once a day   -- It is very important that you take or use this exactly as directed.  Do not skip doses or discontinue unless directed by your doctor.    -- Indication: For COPD exacerbation    simethicone 80 mg oral tablet, chewable  -- 2 tab(s) by mouth 4 times a day, As needed, Gas  -- Indication: For Flatulence     fluticasone 50 mcg/inh nasal spray  -- 1 spray(s) into nose 2 times a day  -- Indication: For Nasal congestion    Protonix 40 mg oral delayed release tablet  -- 1 tab(s) by mouth once a day - 1/2 hour before breakfast  -- Indication: For Stomach protectin

## 2019-01-31 NOTE — PHARMACOTHERAPY INTERVENTION NOTE - COMMENTS
67 yo F with T2DM A1C 8.3 on lantus 10 at bedtime and humalog 5 units three times daily prior to hospitalization. While inpatient receiving lantus 15 units subcutaneously at bedtime, humalog 5 units subcutaneously three times daily, moderate dose correctional scale, and methylprednisolone 20mg IV Q12H. BG in past 24 hours were 219, 208, 239, 139. Recommendation made to increase humalog from 5 to 8 units subcutaneously three times daily.    Gerry Nielsen, PharmD  [phone]

## 2019-01-31 NOTE — DISCHARGE NOTE ADULT - INSTRUCTIONS
Consistent carbohydrate no SNACKS  GLUCERNA SHAKE 2X/DAY Consistent carbohydrate no SNACKS  GLUCERNA SHAKE 2X/DAY  no fluid restrictions

## 2019-01-31 NOTE — DISCHARGE NOTE ADULT - CONDITIONS AT DISCHARGE
Alert and or Alert and oriented X 4. Skin color good warm and dry to touch. Respirations regular and unlabored. Denies pain or discomfort. Appetite good at meals. Right chest mediport deacessed by Central IV RN. OOB to chair tolerated well.  Perianal dressing clean dry and intact. No distress noted at time of discharge.

## 2019-01-31 NOTE — DISCHARGE NOTE ADULT - PROVIDER TOKENS
GEOVANNI:'[id number]' PROVIDER:[TOKEN:[[id number]]],PROVIDER:[TOKEN:[[id number]]],PROVIDER:[TOKEN:[[id number]]] PROVIDER:[TOKEN:[[id number]]],PROVIDER:[TOKEN:[[id number]]],PROVIDER:[TOKEN:[[id number]]],PROVIDER:[TOKEN:[[id number]]]

## 2019-01-31 NOTE — DISCHARGE NOTE ADULT - HOSPITAL COURSE
68M severe asthma/COPD, tracheomalacia s/p tracheo-bronchoplasty (2016), anal SCC s/p chemo/RT/proctectomy with colostomy, adrenal insufficiency on chronic medrol, CKD, afib on apixaban, T2DM, remote DVT, recent admission for coronavirus infection/COPD exacerbation/CAP presents with fever, increased sputum production, and dysuria x 1-2 days, admitted meeting SIRS criteria, suspicious for perianal abscess ? rectal fistula       Problem/Plan - 1:  ·  Problem: Sepsis.  Plan: I suspect that source was likely the perianal area  MRI Abdomen/Pelvis showing Peripherally enhancing collection in the perianal region, slightly to the left of midline may correlate with the reported draining perineal sinus.   Peripheral enhancement is a nonspecific finding and may be secondary to   infection or secondary to chronicity.  Continue Cefepime with Flagyl for now until we have definitive plan from crs  Urine culture negative. Blood cultures with no growth to date.      Problem/Plan - 2:  ·  Problem: COPD exacerbation.  Plan: Clinically improving.  Continue oral Medrol 32mg daily.  Duoneb PRN.  acapella.      Problem/Plan - 3:  ·  Problem: Visual changes.  Plan: Patient had visual disturbances Thursday night  Seen by ophthalmology, attribute to a vest she was using for chest PT.  Patient has stopped using the vest.  Outpatient ophthalmology follow-up recommended.      Problem/Plan - 4:  ·  Problem: T2DM (type 2 diabetes mellitus).  Plan: Continue Lantus 15 units qhs and Humalog 8 units TID pre-meal.  Continue to monitor blood sugars.  Will likely need to decrease insulin doses as steroids further tapered.   HgA1c 8.3 earlier this month.      Problem/Plan - 5:  ·  Problem: Adrenal insufficiency.  Plan: c/w Medrol 32mg daily. Will need slow taper to home dose (4mg daily).      Problem/Plan - 6:  Problem: Atrial fibrillation. Plan: Continue apixaban for anticoagulation.  Continue digoxin. Digoxin level is at goal.

## 2019-01-31 NOTE — PROGRESS NOTE ADULT - ASSESSMENT
68F PMH severe asthma/COPD, tracheomalacia s/p tracheo-bronchoplasty (2016), anal SCC s/p chemo/RT/proctectomy with colostomy, adrenal insufficiency on chronic medrol, CKD, afib on apixaban, T2DM, remote DVT, recent admission for coronavirus infection/COPD exacerbation/CAP presents with fever, increased sputum production, and dysuria x 1-2 days, admitted meeting SIRS criteria, suspicious for pulmonary vs urinary source.  Now with concern for perianal abscess.  Also with COPD exacerbation.

## 2019-01-31 NOTE — PROGRESS NOTE ADULT - SUBJECTIVE AND OBJECTIVE BOX
SUBJECTIVE:  Seen with medicine NP, Tameka Marie.  Initially was OOB in chair.  Breathing better.  Reports some drainage from rectal fistula site overnight.  No pain.  NAD.    MEDICATIONS  (STANDING):  apixaban 5 milliGRAM(s) Oral every 12 hours  buDESOnide 160 MICROgram(s)/formoterol 4.5 MICROgram(s) Inhaler 2 Puff(s) Inhalation two times a day  cefepime   IVPB 2000 milliGRAM(s) IV Intermittent every 8 hours  dextrose 5%. 1000 milliLiter(s) (50 mL/Hr) IV Continuous <Continuous>  dextrose 50% Injectable 12.5 Gram(s) IV Push once  dextrose 50% Injectable 25 Gram(s) IV Push once  dextrose 50% Injectable 25 Gram(s) IV Push once  digoxin     Tablet 0.25 milliGRAM(s) Oral daily  docusate sodium 100 milliGRAM(s) Oral three times a day  fluticasone propionate 50 MICROgram(s)/spray Nasal Spray 1 Spray(s) Both Nostrils two times a day  influenza   Vaccine 0.5 milliLiter(s) IntraMuscular once  insulin glargine Injectable (LANTUS) 15 Unit(s) SubCutaneous at bedtime  insulin lispro (HumaLOG) corrective regimen sliding scale   SubCutaneous three times a day before meals  insulin lispro (HumaLOG) corrective regimen sliding scale   SubCutaneous at bedtime  insulin lispro Injectable (HumaLOG) 8 Unit(s) SubCutaneous three times a day before meals  ipratropium 42 MICROgram(s) Nasal 1 Spray(s) Nasal three times a day  methylPREDNISolone sodium succinate Injectable 20 milliGRAM(s) IV Push every 12 hours  metroNIDAZOLE    Tablet 500 milliGRAM(s) Oral two times a day  montelukast 10 milliGRAM(s) Oral daily  pantoprazole    Tablet 40 milliGRAM(s) Oral before breakfast  polyethylene glycol 3350 17 Gram(s) Oral every 12 hours  senna 2 Tablet(s) Oral at bedtime  tiotropium 18 MICROgram(s) Capsule 1 Capsule(s) Inhalation daily    MEDICATIONS  (PRN):  acetaminophen   Tablet .. 975 milliGRAM(s) Oral every 6 hours PRN Temp greater or equal to 38C (100.4F), Mild Pain (1 - 3), Moderate Pain (4 - 6)  ALBUTerol/ipratropium for Nebulization 3 milliLiter(s) Nebulizer every 6 hours PRN Shortness of Breath and/or Wheezing  dextrose 40% Gel 15 Gram(s) Oral once PRN Blood Glucose LESS THAN 70 milliGRAM(s)/deciliter  glucagon  Injectable 1 milliGRAM(s) IntraMuscular once PRN Glucose LESS THAN 70 milligrams/deciliter  guaiFENesin  milliGRAM(s) Oral every 12 hours PRN Cough  metoclopramide 5 milliGRAM(s) Oral three times a day PRN Nausea  simethicone 80 milliGRAM(s) Chew four times a day PRN Gas  traMADol 25 milliGRAM(s) Oral every 4 hours PRN Severe Pain (7 - 10)      Vital Signs Last 24 Hrs  T(C): 36.8 (2019 14:46), Max: 36.9 (2019 15:37)  T(F): 98.2 (2019 14:46), Max: 98.4 (2019 15:37)  HR: 64 (2019 14:46) (64 - 83)  BP: 135/70 (2019 14:46) (112/71 - 135/70)  BP(mean): --  RR: 18 (2019 14:46) (17 - 19)  SpO2: 98% (2019 14:46) (98% - 99%)    CAPILLARY BLOOD GLUCOSE      POCT Blood Glucose.: 219 mg/dL (2019 12:02)  POCT Blood Glucose.: 208 mg/dL (2019 08:03)  POCT Blood Glucose.: 239 mg/dL (2019 22:10)  POCT Blood Glucose.: 139 mg/dL (2019 18:08)  POCT Blood Glucose.: 174 mg/dL (2019 17:02)    I&O's Summary    2019 07:  -  2019 07:00  --------------------------------------------------------  IN: 480 mL / OUT: 2080 mL / NET: -1600 mL    2019 07:01  -  2019 15:06  --------------------------------------------------------  IN: 580 mL / OUT: 400 mL / NET: 180 mL        PHYSICAL EXAM:  GENERAL: Looks stated age, NAD  CARDIOVASCULAR: Normal S1, S2  PULMONARY: Lungs clear to auscultation bilaterally. No wheezes/rales/rhonchi  GI: Abdomen soft, Nontender. Bowel sounds present.  No active drainage noted in rectal area.  MSK/Ext:  No leg edema.  No calf tenderness bilaterally  PSYCH: Normal Affect.      LABS:                        10.0   15.84 )-----------( 220      ( 2019 08:44 )             31.7     -    142  |  106  |  12  ----------------------------<  232<H>  4.2   |  27  |  0.50    Ca    8.6      2019 07:08  Phos  2.9       Mg     2.1         TPro  7.6  /  Alb  4.0  /  TBili  0.3  /  DBili  x   /  AST  14  /  ALT  10  /  AlkPhos  91      PT/INR - ( 2019 16:27 )   PT: 15.5 sec;   INR: 1.35 ratio               Urinalysis Basic - ( 2019 18:14 )    Color: Yellow / Appearance: Clear / S.024 / pH: x  Gluc: x / Ketone: Negative  / Bili: Negative / Urobili: Negative   Blood: x / Protein: 30 mg/dL / Nitrite: Negative   Leuk Esterase: Negative / RBC: 8 /hpf / WBC 2 /HPF   Sq Epi: x / Non Sq Epi: 1 /hpf / Bacteria: Negative          RADIOLOGY & ADDITIONAL TESTS:

## 2019-01-31 NOTE — CONSULT NOTE ADULT - ASSESSMENT
Shirley Bloom is a 68 y.o. woman with history of anal cancer s/p abdominoperineal proctectomy (7/24/2018) with a small, draining perineal sinus. No sign of active infection.     Plan:  - Continue with dressing over site; change as needed  - Continue with outpatient wound care follow up  - No surgical intervention at this time  - Discussed with colorectal fellow, Dr. Magalys Hogan, PGY2  [phone]

## 2019-01-31 NOTE — DISCHARGE NOTE ADULT - NS AS DC STROKE ED MATERIALS
Call 911 for Stroke/Risk Factors for Stroke/Prescribed Medications/Need for Followup After Discharge/Stroke Warning Signs and Symptoms/Stroke Education Booklet

## 2019-01-31 NOTE — CONSULT NOTE ADULT - SUBJECTIVE AND OBJECTIVE BOX
General Surgery Consult      Consulting surgical team: Red  Consulting attending: Dr. Luong     HPI: Shirley Bloom is a 68 y.o. woman with history of COPD, tracheomalacia s/p tracheo-bronchoplasty (2016), anal SCC s/p chemo/RT/APR with colostomy, adrenal insufficiency on chronic medrol, CKD, afib on apixaban, DM, and DVT (remote) currently in the hospital for COPD exacerbation and pneumonia.     Colorectal surgery consultation for drainage from her perineal wound.     The patient states that the drainage began last night. Patient denies any associated perineal pain, abdominal pain, nausea, vomiting, or changes in her appetite.       PAST MEDICAL HISTORY:  TIA (transient ischemic attack)  DVT (deep venous thrombosis)  Seizure  Rectal bleeding  Aortic disease  Colorectal cancer  Tracheobronchomalacia  Hypertension  Asthma  Pelvic fracture  Aortic insufficiency  Adrenal insufficiency  COPD (chronic obstructive pulmonary disease)  Hypertension  Diabetes  Atrial fibrillation      PAST SURGICAL HISTORY:  Rectal bleeding  S/P bronchoscopy  History of tracheomalacia  History of surgery  H/O pelvic surgery  Exostosis of orbit, left  History of sinus surgery  H/O total knee replacement, bilateral  History of partial hysterectomy      MEDICATIONS:  acetaminophen   Tablet .. 975 milliGRAM(s) Oral every 6 hours PRN  ALBUTerol/ipratropium for Nebulization 3 milliLiter(s) Nebulizer every 6 hours PRN  apixaban 5 milliGRAM(s) Oral every 12 hours  buDESOnide 160 MICROgram(s)/formoterol 4.5 MICROgram(s) Inhaler 2 Puff(s) Inhalation two times a day  cefepime   IVPB 2000 milliGRAM(s) IV Intermittent every 8 hours  dextrose 40% Gel 15 Gram(s) Oral once PRN  dextrose 5%. 1000 milliLiter(s) IV Continuous <Continuous>  dextrose 50% Injectable 12.5 Gram(s) IV Push once  dextrose 50% Injectable 25 Gram(s) IV Push once  dextrose 50% Injectable 25 Gram(s) IV Push once  digoxin     Tablet 0.25 milliGRAM(s) Oral daily  docusate sodium 100 milliGRAM(s) Oral three times a day  fluticasone propionate 50 MICROgram(s)/spray Nasal Spray 1 Spray(s) Both Nostrils two times a day  glucagon  Injectable 1 milliGRAM(s) IntraMuscular once PRN  guaiFENesin  milliGRAM(s) Oral every 12 hours PRN  influenza   Vaccine 0.5 milliLiter(s) IntraMuscular once  insulin glargine Injectable (LANTUS) 15 Unit(s) SubCutaneous at bedtime  insulin lispro (HumaLOG) corrective regimen sliding scale   SubCutaneous three times a day before meals  insulin lispro (HumaLOG) corrective regimen sliding scale   SubCutaneous at bedtime  insulin lispro Injectable (HumaLOG) 8 Unit(s) SubCutaneous three times a day before meals  ipratropium 42 MICROgram(s) Nasal 1 Spray(s) Nasal three times a day  methylPREDNISolone sodium succinate Injectable 20 milliGRAM(s) IV Push every 12 hours  metoclopramide 5 milliGRAM(s) Oral three times a day PRN  metroNIDAZOLE    Tablet 500 milliGRAM(s) Oral two times a day  montelukast 10 milliGRAM(s) Oral daily  pantoprazole    Tablet 40 milliGRAM(s) Oral before breakfast  polyethylene glycol 3350 17 Gram(s) Oral every 12 hours  senna 2 Tablet(s) Oral at bedtime  simethicone 80 milliGRAM(s) Chew four times a day PRN  tiotropium 18 MICROgram(s) Capsule 1 Capsule(s) Inhalation daily  traMADol 25 milliGRAM(s) Oral every 4 hours PRN      ALLERGIES:  ampicillin (Short breath)  aspirin (Short breath)  Avelox (Short breath; Pruritus)  codeine (Short breath)  Dilaudid (Short breath)  iodine (Short breath; Swelling)  penicillin (Short breath)  shellfish (Anaphylaxis)  tetanus toxoid (Short breath)  Valium (Short breath)      VITALS & I/Os:  Vital Signs Last 24 Hrs  T(C): 36.8 (2019 14:46), Max: 36.9 (2019 15:37)  T(F): 98.2 (2019 14:46), Max: 98.4 (2019 15:37)  HR: 64 (2019 14:46) (64 - 82)  BP: 135/70 (2019 14:46) (112/71 - 135/70)  BP(mean): --  RR: 18 (2019 14:46) (17 - 18)  SpO2: 98% (2019 14:46) (98% - 99%)    I&O's Summary    2019 07:01  -  2019 07:00  --------------------------------------------------------  IN: 480 mL / OUT: 2080 mL / NET: -1600 mL    2019 07:01  -  2019 15:20  --------------------------------------------------------  IN: 580 mL / OUT: 400 mL / NET: 180 mL      PHYSICAL EXAM:  General: No acute distress  Respiratory: Nonlabored  Cardiovascular: normotensive, regular rate  Abdominal: Soft, nondistended, nontender. No rebound or guarding. No organomegaly, no palpable mass. Colostomy pink with function in appliance  Rectal: s/p APR, small opening posterior aspect of perineum with minimal cloudy/serous drainage, no surrounding erythema or induration, nontender       LABS:                        10.0   15.84 )-----------( 220      ( 2019 08:44 )             31.7         142  |  106  |  12  ----------------------------<  232<H>  4.2   |  27  |  0.50    Ca    8.6      2019 07:08  Phos  2.9       Mg     2.1         TPro  7.6  /  Alb  4.0  /  TBili  0.3  /  DBili  x   /  AST  14  /  ALT  10  /  AlkPhos  91      Lactate:    PT/INR - ( 2019 16:27 )   PT: 15.5 sec;   INR: 1.35 ratio      Urinalysis Basic - ( 2019 18:14 )    Color: Yellow / Appearance: Clear / S.024 / pH: x  Gluc: x / Ketone: Negative  / Bili: Negative / Urobili: Negative   Blood: x / Protein: 30 mg/dL / Nitrite: Negative   Leuk Esterase: Negative / RBC: 8 /hpf / WBC 2 /HPF   Sq Epi: x / Non Sq Epi: 1 /hpf / Bacteria: Negative      IMAGING:  CT Abdomen and Pelvis No Cont (19 @ 17:32)   CHEST:     LUNGS AND LARGE AIRWAYS: Trace secretions within the bronchus   intermedius. There are unchangedscattered tree-in-bud opacities in the   right middle and lower lobes, some of which are calcified likely   secondary to chronic mucoid impaction. A 1.0 cm right basilar   pleural-based nodule is unchanged from 2018.    PLEURA: No pleural effusion.    VESSELS: Right chest port with the tip in the SVC.     HEART: Heart size is normal. No pericardial effusion. Aortic valve and   coronary artery calcifications.    MEDIASTINUM AND MARANDA: No lymphadenopathy.    CHEST WALL AND LOWER NECK: Within normal limits.    ABDOMEN AND PELVIS:    LIVER: Within normal limits.  BILE DUCTS: Normal caliber.  GALLBLADDER: Within normal limits.  SPLEEN: Within normal limits.    PANCREAS: Two exophytic cystic lesions measuring 2.2 cm and 1.8 cm in the   tail ofthe pancreas are unchanged dating back to CT dated 2017.     ADRENALS: Within normal limits.    KIDNEYS/URETERS: Parenchymal calcifications within the left kidney noted   are indeterminate.      BLADDER: Within normal limits.    REPRODUCTIVE ORGANS: Hysterectomy.    BOWEL: Status post distal proctocolectomy with left lower quadrant   colostomy. Grossly stable postoperative changes in the distal colectomy   considering differences in technique compared to 2018, however,   please note the bowel is suboptimally evaluated without oral and IV   contrast. No bowel obstruction. Appendix is not visualized.    PERITONEUM: No ascites.    VESSELS:  Atheromatous disease of the abdominal aorta.    RETROPERITONEUM: No lymphadenopathy.      ABDOMINAL WALL: Calcified injection granulomas in the left inner gluteal   soft tissues. Heterotopic ossification along the lower midline anterior   abdominal wall is unchanged.    BONES: Degenerative changes of the spine. Sclerotic lesions in the T4,   T8, and T11 vertebral bodies are unchanged. Status post fixation of the   pubic symphysis and right sacroiliac joint.      IMPRESSION:   No interval change compared to prior exam of 2018, with details as   above.

## 2019-01-31 NOTE — DISCHARGE NOTE ADULT - SECONDARY DIAGNOSIS.
COPD exacerbation Visual changes T2DM (type 2 diabetes mellitus) Adrenal insufficiency Atrial fibrillation Asthma
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
## 2019-01-31 NOTE — PROGRESS NOTE ADULT - PROBLEM SELECTOR PLAN 3
Will increase Lantus to units qhs and Humalog to units TID pre-meal.  Continue to monitor blood sugars.  HgA1c 8.3 earlier this month. Will continue Lantus 15 units qhs and increase Humalog to 8 units TID pre-meal.  Continue to monitor blood sugars.  HgA1c 8.3 earlier this month.

## 2019-01-31 NOTE — PROGRESS NOTE ADULT - ATTENDING COMMENTS
as above--slightly better  multifactorial sob--AF/AV, asthma exacerbation, bronchiectasis, TBM, CB in face of corona virus  ID--?need for ABX--check sputum and urine (only on cefepime/zith)  Asthma-solumedrol 20 q 6, symbicort, spiriva, singulair--vest rx and acapella needed  TBM-vest rx.acapella  DVT-on rx  cards-as per Tosin Allison MD-Pulmonary   [phone]

## 2019-01-31 NOTE — DISCHARGE NOTE ADULT - ADDITIONAL INSTRUCTIONS
Please call [phone] to schedule a follow up appointment with Dr. Terrell Luong within 1 week of discharge Please call [phone] to schedule a follow up appointment with Dr. Terrell Luong within 1 week of discharge  Please follow up with Dr. Allison Regarding taping of steroids   Please follow up with  Endocrinologist for Diabetes coverage   Follow-up with your primary care physician within 1 week. Call for appointment.  Please bring all discharge paperwork and list of medications to all follow up appointments  Please call for follow up appoints one day after discharge Please call [phone] to schedule a follow up appointment with Dr. Terrell Luong within 1 week of discharge  Please follow up with Dr. Allison Regarding taping of steroids within one week  Please follow up with  Endocrinologist for Diabetes coverage   Follow-up with your primary care physician within 1 week. Call for appointment.  Please bring all discharge paperwork and list of medications to all follow up appointments  Please call for follow up appoints one day after discharge  follow up with primary care physician within one week after discharge  you can resume cardiac rehab and follow up with cardiology Dr. Leahy as directed

## 2019-01-31 NOTE — DISCHARGE NOTE ADULT - HOME CARE AGENCY
[facility] care  for RN visit to assess for home care services for start of care the day after discharge

## 2019-01-31 NOTE — DISCHARGE NOTE ADULT - PATIENT PORTAL LINK FT
You can access the [facility] Patient Portal, offered by [facility], by registering with the following website: [email or web address]

## 2019-01-31 NOTE — DISCHARGE NOTE ADULT - CARE PROVIDER_API CALL
Terrell Luong)  ColonRectal Surgery; Surgery  [address], [address]  [place] [address]  Phone: [phone]  Fax: [phone] Terrell Luong)  ColonRectal Surgery; Surgery  [address], [address]  [place] [address]  Phone: [phone]  Fax: [phone]  Follow Up Time:     Sergio Lai)  Infectious Disease; Internal Medicine  [address] [place] [address]  Phone: [phone]  Fax: [phone]  Follow Up Time:     Eulalio Allison)  Internal Medicine; Pulmonary Disease  [address], [address]  [place] [address]  Phone: [phone]  Fax: [phone]  Follow Up Time: Terrell Luong)  ColonRectal Surgery; Surgery  [address], [address]  [place] [address]  Phone: [phone]  Fax: [phone]  Follow Up Time:     Sergio Lai)  Infectious Disease; Internal Medicine  [address] [place] [address]  Phone: [phone]  Fax: [phone]  Follow Up Time:     Eulalio Allison)  Internal Medicine; Pulmonary Disease  [address], [address]  [place] [address]  Phone: [phone]  Fax: [phone]  Follow Up Time:     Steven Leahy)  Cardiovascular Disease  [address] [place] [address]  Phone: [phone]  Fax: [phone]  Follow Up Time:

## 2019-01-31 NOTE — DISCHARGE NOTE ADULT - NS AS ACTIVITY OBS
No Heavy lifting/straining/Do not make important decisions activity as tolerated/No Heavy lifting/straining/Do not make important decisions
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
## 2019-01-31 NOTE — DISCHARGE NOTE ADULT - CARE PLAN
Principal Discharge DX:	Sepsis  Goal:	Improving  Assessment and plan of treatment:	suspect that source was likely the perianal area  MRI Abdomen/Pelvis showing Peripherally enhancing collection in the perianal region, slightly to the left of midline may correlate with the reported draining perineal sinus.   Recommend continuing daily gentle packing of sinus tract in order to promote drainage.   - Please have patient follow up with Dr. Luong within one week of discharge (included in Discharge Note).  Secondary Diagnosis:	COPD exacerbation  Assessment and plan of treatment:	Call your Health Care provider upon arrival home to make a follow up appointment within one week.  Take all inhalers as prescribed by your Health Care Provider.  Take steroids as prescribed by your Health Care Provider.  If your cough increases infrequency and severity and/or you have shortness of breath or increased shortness of breath call your Health Care Provider.  If you develop fever, chills, night sweats, malaise, and/or change in mental status call your Health care Provider.  Nutrition is very important.  Eat small frequent meals.  Increase your activity as tolerated.  Do not stay in bed all day  Secondary Diagnosis:	Visual changes  Assessment and plan of treatment:	Seen by ophthalmology, attribute to a vest she was using for chest PT.  Patient has stopped using the vest.  Secondary Diagnosis:	T2DM (type 2 diabetes mellitus)  Assessment and plan of treatment:	HgA1C this admission. 8.3  Make sure you get your HgA1c checked every three months.  If you take oral diabetes medications, check your blood glucose two times a day.  If you take insulin, check your blood glucose before meals and at bedtime.  It's important not to skip any meals.  Keep a log of your blood glucose results and always take it with you to your doctor appointments.  Keep a list of your current medications including injectables and over the counter medications and bring this medication list with you to all your doctor appointments.  If you have not seen your opthalmologist this year call for appointment.  Check your feet daily for redness, sores, or openings. Do not self treat. If no improvement in two days call your primary care physician for an appointment.  Low blood sugar (hypoglycemia) is a blood sugar below 70mg/dl. Check your blood sugar if you feel signs/symptoms of hypoglycemia. If your blood sugar is below 70 take 15 grams of carbohydrates (ex 4 oz of apple juice, 3-4 glucosr tablets, or 4-6 oz of regular soda) wait 15 minutes and repeat blood sugar to make sure it comes up above 70.  If your blood sugar is above 70 and you are due for a meal, have a meal.  If you are not due for a meal have a snack.  This snack helps keeps your blood sugar at a safe range.  Continue to monitor blood sugars.  Secondary Diagnosis:	Adrenal insufficiency  Assessment and plan of treatment:	Will need slow taper to home dose (4mg daily).  Secondary Diagnosis:	Atrial fibrillation  Assessment and plan of treatment:	continue on Eliquis  Secondary Diagnosis:	Asthma  Assessment and plan of treatment:	please follow up with Dr. frazier in one week  Continue with steroid at 24 mg daily for one week  Abnormal CT scan, chest. Plan: no changes from 12/18--ct f/up in 3  months for nodule.  symbicort, spiriva, singulair-- acapella to continue                 Asthma-on medrol 32mg per day can reduce to 24mg, symbicort, spiriva, singulair-- acapella to continue Principal Discharge DX:	Sepsis  Goal:	Improving  Assessment and plan of treatment:	suspect that source was likely the perianal area  MRI Abdomen/Pelvis showing Peripherally enhancing collection in the perianal region, slightly to the left of midline may correlate with the reported draining perineal sinus.   Recommend continuing daily gentle packing of sinus tract in order to promote drainage.   - Please have patient follow up with Dr. Luong within one week of discharge (included in Discharge Note).  dressing caqre w/ 1/4 inch plain packing to sinus tract, cover w/ gauze, Allevyn and Tegaderm at bottom of wound  Secondary Diagnosis:	COPD exacerbation  Assessment and plan of treatment:	Call your Health Care provider upon arrival home to make a follow up appointment within one week.  Take all inhalers as prescribed by your Health Care Provider.  Take steroids as prescribed by your Health Care Provider.  If your cough increases infrequency and severity and/or you have shortness of breath or increased shortness of breath call your Health Care Provider.  If you develop fever, chills, night sweats, malaise, and/or change in mental status call your Health care Provider.  Nutrition is very important.  Eat small frequent meals.  Increase your activity as tolerated.  Do not stay in bed all day  Medrol 24 mg daily w/ food - you need to see Dr. Frazier in one week  Secondary Diagnosis:	Visual changes  Assessment and plan of treatment:	Seen by ophthalmology, attribute to a vest she was using for chest PT.  Patient has stopped using the vest.  Secondary Diagnosis:	T2DM (type 2 diabetes mellitus)  Assessment and plan of treatment:	HgA1C this admission. 8.3  Make sure you get your HgA1c checked every three months.  If you take oral diabetes medications, check your blood glucose two times a day.  If you take insulin, check your blood glucose before meals and at bedtime.  It's important not to skip any meals.  Keep a log of your blood glucose results and always take it with you to your doctor appointments.  Keep a list of your current medications including injectables and over the counter medications and bring this medication list with you to all your doctor appointments.  If you have not seen your opthalmologist this year call for appointment.  Check your feet daily for redness, sores, or openings. Do not self treat. If no improvement in two days call your primary care physician for an appointment.  Low blood sugar (hypoglycemia) is a blood sugar below 70mg/dl. Check your blood sugar if you feel signs/symptoms of hypoglycemia. If your blood sugar is below 70 take 15 grams of carbohydrates (ex 4 oz of apple juice, 3-4 glucosr tablets, or 4-6 oz of regular soda) wait 15 minutes and repeat blood sugar to make sure it comes up above 70.  If your blood sugar is above 70 and you are due for a meal, have a meal.  If you are not due for a meal have a snack.  This snack helps keeps your blood sugar at a safe range.  Continue to monitor blood sugars.  Secondary Diagnosis:	Adrenal insufficiency  Assessment and plan of treatment:	Will need slow taper to home dose .  Secondary Diagnosis:	Atrial fibrillation  Assessment and plan of treatment:	Atrial fibrillation is the most common heart rhythm problem & has the risk of stroke & heart attack  It helps if you control your blood pressure, not drink more than 1-2 alcohol drinks per day, cut down on caffeine, getting treatment for over active thyroid gland, & getting exercise  Call your doctor if you feel your heart racing or beating unusually, chest tightness or pain, lightheaded, faint, shortness of breath especially with exercise  It is important to take your heart medication as prescribed  You are on Eliquis for anticoagulation and stroke prevention  Eliquis/Apixaban is used to thin the blood so clots will not form and to keep existing ones from getting bigger.  Take this medication daily as prescribed by your health care provider.  Take this medication with food to prevent upset stomach.  If you miss a dose call your health care provider or pharmacist right away.  Tell your doctor you use this drug before you have a spinal or epidural procedure  Tell dentists, surgeon, and other doctors that you use this drug.  You may bleed more easily.  Be careful and avoid injury.  Use a soft toothbrush and an electric razor.  Assessment and plan of treatment:	please follow up with Dr. frazier in one week  Continue with steroid at 24 mg daily for one week until you see Dr. Frazier  Abnormal CT scan, chest. Plan: no changes from 12/18--ct f/up in 3  months for nodule.  symbicort, spiriva, singulair-- acapella to continue                 Asthma-on medrol 32mg per day can reduce to 24mg, symbicort, spiriva, singulair-- acapella to continue

## 2019-01-31 NOTE — PROGRESS NOTE ADULT - SUBJECTIVE AND OBJECTIVE BOX
CHIEF COMPLAINT:  f/up for sob, TBM, bronchiectasis, asthma, recurrent PNA-corona virus-slightly better--no temps, less cough over all, some sob    Interval Events: ID evaln    REVIEW OF SYSTEMS:  Constitutional: No fevers or chills. No weight loss. + fatigue or generalized malaise.  Eyes: No itching or discharge from the eyes  ENT: No ear pain. No ear discharge. No nasal congestion. No post nasal drip. No epistaxis. No throat pain. No sore throat. No difficulty swallowing.   CV: No chest pain. No palpitations. No lightheadedness or dizziness.   Resp: No dyspnea at rest. + dyspnea on exertion. No orthopnea. + wheezing. + cough. No stridor. + sputum production. No chest pain with respiration.  GI: No nausea. No vomiting. No diarrhea.  MSK: No joint pain or pain in any extremities  Integumentary: No skin lesions. No pedal edema.  Neurological: No gross motor weakness. No sensory changes.  [+ ] All other systems negative  [ ] Unable to assess ROS because ________    OBJECTIVE:  ICU Vital Signs Last 24 Hrs  T(C): 36.5 (2019 04:01), Max: 36.9 (2019 15:37)  T(F): 97.7 (2019 04:01), Max: 98.4 (2019 15:37)  HR: 67 (2019 04:01) (67 - 83)  BP: 115/68 (2019 04:01) (112/71 - 132/71)  BP(mean): --  ABP: --  ABP(mean): --  RR: 17 (2019 04:01) (17 - 19)  SpO2: 99% (2019 04:01) (98% - 99%)         @ 07:01  -  - @ 05:55  --------------------------------------------------------  IN: 480 mL / OUT: 2080 mL / NET: -1600 mL      CAPILLARY BLOOD GLUCOSE      POCT Blood Glucose.: 239 mg/dL (2019 22:10)      PHYSICAL EXAM: NAD in bed on RA  General: Awake, alert, oriented X 3.   HEENT: Atraumatic, normocephalic.                 Mallampatti Grade 2                No nasal congestion.                No tonsillar or pharyngeal exudates.  Lymph Nodes: No palpable lymphadenopathy  Neck: No JVD. No carotid bruit.   Respiratory: Normal chest expansion                         Normal percussion                         Normal and equal air entry                         + wheeze and rhonchi but no rales.  Cardiovascular: S1 S2 normal. No murmurs, rubs or gallops.   Abdomen: Soft, non-tender, non-distended. No organomegaly. Normoactive bowel sounds. Ostomy in tact  Extremities: Warm to touch. Peripheral pulse palpable. No pedal edema.   Skin: No rashes or skin lesions  Neurological: Motor and sensory examination equal and normal in all four extremities.  Psychiatry: Appropriate mood and affect.    HOSPITAL MEDICATIONS:  MEDICATIONS  (STANDING):  apixaban 5 milliGRAM(s) Oral every 12 hours  azithromycin  IVPB 500 milliGRAM(s) IV Intermittent every 24 hours  buDESOnide 160 MICROgram(s)/formoterol 4.5 MICROgram(s) Inhaler 2 Puff(s) Inhalation two times a day  cefepime   IVPB 2000 milliGRAM(s) IV Intermittent every 8 hours  dextrose 5%. 1000 milliLiter(s) (50 mL/Hr) IV Continuous <Continuous>  dextrose 50% Injectable 12.5 Gram(s) IV Push once  dextrose 50% Injectable 25 Gram(s) IV Push once  dextrose 50% Injectable 25 Gram(s) IV Push once  digoxin     Tablet 0.25 milliGRAM(s) Oral daily  docusate sodium 100 milliGRAM(s) Oral three times a day  fluticasone propionate 50 MICROgram(s)/spray Nasal Spray 1 Spray(s) Both Nostrils two times a day  influenza   Vaccine 0.5 milliLiter(s) IntraMuscular once  insulin glargine Injectable (LANTUS) 15 Unit(s) SubCutaneous at bedtime  insulin lispro (HumaLOG) corrective regimen sliding scale   SubCutaneous three times a day before meals  insulin lispro (HumaLOG) corrective regimen sliding scale   SubCutaneous at bedtime  insulin lispro Injectable (HumaLOG) 5 Unit(s) SubCutaneous three times a day before meals  methylPREDNISolone sodium succinate Injectable 20 milliGRAM(s) IV Push every 12 hours  montelukast 10 milliGRAM(s) Oral daily  pantoprazole    Tablet 40 milliGRAM(s) Oral before breakfast  polyethylene glycol 3350 17 Gram(s) Oral every 12 hours  senna 2 Tablet(s) Oral at bedtime  tiotropium 18 MICROgram(s) Capsule 1 Capsule(s) Inhalation daily    MEDICATIONS  (PRN):  acetaminophen   Tablet .. 975 milliGRAM(s) Oral every 6 hours PRN Temp greater or equal to 38C (100.4F), Mild Pain (1 - 3), Moderate Pain (4 - 6)  ALBUTerol/ipratropium for Nebulization 3 milliLiter(s) Nebulizer every 6 hours PRN Shortness of Breath and/or Wheezing  dextrose 40% Gel 15 Gram(s) Oral once PRN Blood Glucose LESS THAN 70 milliGRAM(s)/deciliter  glucagon  Injectable 1 milliGRAM(s) IntraMuscular once PRN Glucose LESS THAN 70 milligrams/deciliter  guaiFENesin  milliGRAM(s) Oral every 12 hours PRN Cough  metoclopramide 5 milliGRAM(s) Oral three times a day PRN Nausea  simethicone 80 milliGRAM(s) Chew four times a day PRN Gas  traMADol 25 milliGRAM(s) Oral every 4 hours PRN Severe Pain (7 - 10)      LABS:                        10.5   16.7  )-----------( 211      ( 2019 01:11 )             32.4         137  |  101  |  11  ----------------------------<  328<H>  4.7   |  26  |  0.51    Ca    9.4      2019 08:42  Phos  2.9       Mg     2.1         TPro  7.6  /  Alb  4.0  /  TBili  0.3  /  DBili  x   /  AST  14  /  ALT  10  /  AlkPhos  91      PT/INR - ( 2019 16:27 )   PT: 15.5 sec;   INR: 1.35 ratio           Urinalysis Basic - ( 2019 18:14 )    Color: Yellow / Appearance: Clear / S.024 / pH: x  Gluc: x / Ketone: Negative  / Bili: Negative / Urobili: Negative   Blood: x / Protein: 30 mg/dL / Nitrite: Negative   Leuk Esterase: Negative / RBC: 8 /hpf / WBC 2 /HPF   Sq Epi: x / Non Sq Epi: 1 /hpf / Bacteria: Negative        Venous Blood Gas:   @ 16:39  7.43/40/54//  VBG Lactate: 2.4      MICROBIOLOGY:     RADIOLOGY:  [ ] Reviewed and interpreted by me    Point of Care Ultrasound Findings:    PFT:    EKG:

## 2019-01-31 NOTE — DISCHARGE NOTE ADULT - NSFTFSERV1RD_GEN_ALL_CORE
teaching and training/observation and assessment medication teaching and assessment/wound care and assessment/teaching and training/observation and assessment

## 2019-01-31 NOTE — DISCHARGE NOTE ADULT - CARE PROVIDERS DIRECT ADDRESSES
,[email or web address].[email or web address] [email or web address]

## 2019-02-01 DIAGNOSIS — H53.9 UNSPECIFIED VISUAL DISTURBANCE: ICD-10-CM

## 2019-02-01 LAB
DIGOXIN SERPL-MCNC: 0.7 NG/ML — LOW (ref 0.8–2)
GLUCOSE BLDC GLUCOMTR-MCNC: 119 MG/DL — HIGH (ref 70–99)
GLUCOSE BLDC GLUCOMTR-MCNC: 127 MG/DL — HIGH (ref 70–99)
GLUCOSE BLDC GLUCOMTR-MCNC: 222 MG/DL — HIGH (ref 70–99)
GLUCOSE BLDC GLUCOMTR-MCNC: 233 MG/DL — HIGH (ref 70–99)
HCT VFR BLD CALC: 36.6 % — SIGNIFICANT CHANGE UP (ref 34.5–45)
HGB BLD-MCNC: 11.4 G/DL — LOW (ref 11.5–15.5)
MCHC RBC-ENTMCNC: 22.1 PG — LOW (ref 27–34)
MCHC RBC-ENTMCNC: 31.1 GM/DL — LOW (ref 32–36)
MCV RBC AUTO: 71.1 FL — LOW (ref 80–100)
OB PNL STL: NEGATIVE — SIGNIFICANT CHANGE UP
PLATELET # BLD AUTO: 268 K/UL — SIGNIFICANT CHANGE UP (ref 150–400)
RBC # BLD: 5.15 M/UL — SIGNIFICANT CHANGE UP (ref 3.8–5.2)
RBC # FLD: 17.9 % — HIGH (ref 10.3–14.5)
WBC # BLD: 8.36 K/UL — SIGNIFICANT CHANGE UP (ref 3.8–10.5)
WBC # FLD AUTO: 8.36 K/UL — SIGNIFICANT CHANGE UP (ref 3.8–10.5)

## 2019-02-01 PROCEDURE — 99232 SBSQ HOSP IP/OBS MODERATE 35: CPT

## 2019-02-01 PROCEDURE — 99233 SBSQ HOSP IP/OBS HIGH 50: CPT

## 2019-02-01 RX ORDER — ALTEPLASE 100 MG
2 KIT INTRAVENOUS ONCE
Qty: 0 | Refills: 0 | Status: DISCONTINUED | OUTPATIENT
Start: 2019-02-01 | End: 2019-02-02

## 2019-02-01 RX ORDER — PETROLATUM,WHITE
1 JELLY (GRAM) TOPICAL THREE TIMES A DAY
Qty: 0 | Refills: 0 | Status: DISCONTINUED | OUTPATIENT
Start: 2019-02-01 | End: 2019-02-01

## 2019-02-01 RX ADMIN — Medication 500 MILLIGRAM(S): at 05:39

## 2019-02-01 RX ADMIN — Medication 20 MILLIGRAM(S): at 05:39

## 2019-02-01 RX ADMIN — Medication 8 UNIT(S): at 12:33

## 2019-02-01 RX ADMIN — Medication 100 MILLIGRAM(S): at 05:39

## 2019-02-01 RX ADMIN — INSULIN GLARGINE 15 UNIT(S): 100 INJECTION, SOLUTION SUBCUTANEOUS at 22:43

## 2019-02-01 RX ADMIN — Medication 0: at 08:13

## 2019-02-01 RX ADMIN — APIXABAN 5 MILLIGRAM(S): 2.5 TABLET, FILM COATED ORAL at 05:38

## 2019-02-01 RX ADMIN — Medication 100 MILLIGRAM(S): at 21:04

## 2019-02-01 RX ADMIN — Medication 8 UNIT(S): at 08:13

## 2019-02-01 RX ADMIN — TIOTROPIUM BROMIDE 1 CAPSULE(S): 18 CAPSULE ORAL; RESPIRATORY (INHALATION) at 12:37

## 2019-02-01 RX ADMIN — Medication 0.25 MILLIGRAM(S): at 05:39

## 2019-02-01 RX ADMIN — CEFEPIME 100 MILLIGRAM(S): 1 INJECTION, POWDER, FOR SOLUTION INTRAMUSCULAR; INTRAVENOUS at 05:39

## 2019-02-01 RX ADMIN — Medication 0: at 17:23

## 2019-02-01 RX ADMIN — POLYETHYLENE GLYCOL 3350 17 GRAM(S): 17 POWDER, FOR SOLUTION ORAL at 17:27

## 2019-02-01 RX ADMIN — BUDESONIDE AND FORMOTEROL FUMARATE DIHYDRATE 2 PUFF(S): 160; 4.5 AEROSOL RESPIRATORY (INHALATION) at 17:26

## 2019-02-01 RX ADMIN — Medication 600 MILLIGRAM(S): at 17:28

## 2019-02-01 RX ADMIN — Medication 1 SPRAY(S): at 21:05

## 2019-02-01 RX ADMIN — Medication 1 SPRAY(S): at 14:43

## 2019-02-01 RX ADMIN — Medication 32 MILLIGRAM(S): at 17:26

## 2019-02-01 RX ADMIN — MONTELUKAST 10 MILLIGRAM(S): 4 TABLET, CHEWABLE ORAL at 12:37

## 2019-02-01 RX ADMIN — CEFEPIME 100 MILLIGRAM(S): 1 INJECTION, POWDER, FOR SOLUTION INTRAMUSCULAR; INTRAVENOUS at 12:40

## 2019-02-01 RX ADMIN — SENNA PLUS 2 TABLET(S): 8.6 TABLET ORAL at 21:03

## 2019-02-01 RX ADMIN — Medication 100 MILLIGRAM(S): at 14:43

## 2019-02-01 RX ADMIN — Medication 500 MILLIGRAM(S): at 17:27

## 2019-02-01 RX ADMIN — CEFEPIME 100 MILLIGRAM(S): 1 INJECTION, POWDER, FOR SOLUTION INTRAMUSCULAR; INTRAVENOUS at 21:02

## 2019-02-01 RX ADMIN — APIXABAN 5 MILLIGRAM(S): 2.5 TABLET, FILM COATED ORAL at 17:26

## 2019-02-01 RX ADMIN — POLYETHYLENE GLYCOL 3350 17 GRAM(S): 17 POWDER, FOR SOLUTION ORAL at 05:39

## 2019-02-01 RX ADMIN — Medication 4: at 12:33

## 2019-02-01 RX ADMIN — Medication 1 SPRAY(S): at 05:39

## 2019-02-01 RX ADMIN — PANTOPRAZOLE SODIUM 40 MILLIGRAM(S): 20 TABLET, DELAYED RELEASE ORAL at 06:33

## 2019-02-01 RX ADMIN — Medication 8 UNIT(S): at 17:23

## 2019-02-01 RX ADMIN — BUDESONIDE AND FORMOTEROL FUMARATE DIHYDRATE 2 PUFF(S): 160; 4.5 AEROSOL RESPIRATORY (INHALATION) at 05:39

## 2019-02-01 NOTE — DIETITIAN INITIAL EVALUATION ADULT. - ADHERENCE
fair/Pt with T2DM on insulin and victoza at home, current HgBA1C 8.3%. Pt states she had better glycemic control, but noticed elevated HgbA1C attributed to taking steroids. Pt reports checking fingerstick 2 x day with usual BG range 120-200mg/dL. Pt initially stated she follows a strict diet, but admits that she may consume sweet desserts occasionally and does not want to 'deprive myself', Pt also used to drink fruit smoothies with protein powder mixed in, but has stopped 2/2 increase in wt and HgbA1C.

## 2019-02-01 NOTE — DIETITIAN INITIAL EVALUATION ADULT. - PROBLEM SELECTOR PLAN 3
--not dyspneic now, not requiring oxygen. Has increased sputum production.   --c/w solumedrol 20mg, taper down as tolerated.   --appreciate pulmonary recs.   --c/w all pulmonary meds (listed in med list)

## 2019-02-01 NOTE — DIETITIAN INITIAL EVALUATION ADULT. - NS AS NUTRI INTERV ED CONTENT
briefly reviewed Consistent Carbohydrate diet education. Pt reports she has been exposed to many diet education in the past, attributes elevated HgBA1C to taking steroids. Encouraged pt to be more mindful consuming concentrated sweets while on high dose steroids. RD availability made known to pt.

## 2019-02-01 NOTE — PROGRESS NOTE ADULT - ASSESSMENT
68 yr F with a PMH of severe asthma, bronchiectasis, tracheobronchomalacia s/p tracheo-bronchoplasty in 10/16 on chronic low dose Prednisone, Afib on Eliquis, DM2, HTN, Squamous cell CA of the anus on chemo/XRT, s/p abdominoperineal proctectomy for recurrent exophytic anal cancer in 7/18, recent admission to [facility] for acute hypoxic resp failure, coronavirus PNA, discharged with steroid taper who presents with fever x1 day. as per patient has "no idea where it is coming from". States SOB, wheeze and cough at baseline, not expectorating. +mild burning when urinating and pelvic fullness, discomfort. Status ostomy output is unchanged. Denies chest pain or abdominal pain otherwise. +Nausea with 1 episode of emesis the day of admission.    A/P:   Fever for 1 day, 102 in ED. Leukocytosis- ?2/2 rectal abscess, doubt new pulm infection.   Blood and urine cultures NGTD. c/w Cefepime and Flagyl as per ID.   CT C/A/P noncon did not show obvious infection - ?CT abd with contrast vs. Ultrasound to eval for rectal abscess as continues to have significant drainage. Surgical service following.    Respiratory symptoms and exam at baseline, CT chest unchanged, on RA - would transition IV steroids to PO- Medrol 40 mg PO QD and slow taper until back to chronic dose  c/w home pulm regimen: Symbicort, Duonebs, Spiriva, Singulair, Flonase  Out of bed to chair, Incentive spirometer, Acapella device. No further chest vest use while in the hospital.    DVT ppx    d/w patient and Dr. 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 as per patient has "no idea where it is coming from". States SOB, wheeze and cough at baseline, not expectorating. +mild burning when urinating and pelvic fullness, discomfort. Status ostomy output is unchanged. Denies chest pain or abdominal pain otherwise. +Nausea with 1 episode of emesis the day of admission.    A/P:   Fever for 1 day, 102 in ED. Leukocytosis- ?2/2 rectal abscess, doubt new pulm infection.   Blood and urine cultures NGTD. c/w Cefepime and Flagyl as per ID.   CT C/A/P noncon did not show obvious infection - ?CT abd with contrast vs. Ultrasound to eval for rectal abscess as continues to have significant drainage. Surgical service following.    Respiratory symptoms and exam at baseline, CT chest unchanged, on RA - would transition IV steroids to PO- Medrol 32 mg PO QD and slow taper until back to chronic dose  c/w home pulm regimen: Symbicort, Duonebs, Spiriva, Singulair, Flonase  Out of bed to chair, Incentive spirometer, Acapella device. No further chest vest use while in the hospital.    DVT ppx    d/w patient and Dr. Estrada

## 2019-02-01 NOTE — DIETITIAN INITIAL EVALUATION ADULT. - PERTINENT MEDS FT
MEDICATIONS  (STANDING):  docusate sodium 100 milliGRAM(s) Oral three times a day  insulin glargine Injectable (LANTUS) 15 Unit(s) SubCutaneous at bedtime  insulin lispro (HumaLOG) corrective regimen sliding scale   SubCutaneous three times a day before meals  insulin lispro (HumaLOG) corrective regimen sliding scale   SubCutaneous at bedtime  insulin lispro Injectable (HumaLOG) 8 Unit(s) SubCutaneous three times a day before meals  methylPREDNISolone 32 milliGRAM(s) Oral daily  polyethylene glycol 3350 17 Gram(s) Oral every 12 hours  senna 2 Tablet(s) Oral at bedtime

## 2019-02-01 NOTE — PROGRESS NOTE ADULT - SUBJECTIVE AND OBJECTIVE BOX
[facility] - Division of Pulmonary, Critical Care and Sleep Medicine   Please call [phone] between 8am-pm weekdays, [phone] after hours and weekends    Interval Events: Continues to have rectal fissure seropurulent drainage, having to change dressing every 2-3 hours. Afebrile. Respiratory status at baseline - requesting acapella as will no longer use chest vest given vision changes yesterday.    REVIEW OF SYSTEMS:  CV: [- ] chest pain   Resp: [ +] cough [- ] shortness of breath   [x ] All other systems negative  [ ] Unable to assess ROS because ________    OBJECTIVE:  ICU Vital Signs Last 24 Hrs  T(C): 36.6 (01 Feb 2019 06:30), Max: 36.8 (31 Jan 2019 14:46)  T(F): 97.9 (01 Feb 2019 06:30), Max: 98.2 (31 Jan 2019 14:46)  HR: 81 (01 Feb 2019 06:30) (55 - 81)  BP: 114/56 (01 Feb 2019 06:30) (100/43 - 149/71)  BP(mean): --  ABP: --  ABP(mean): --  RR: 18 (01 Feb 2019 06:30) (17 - 18)  SpO2: 85% (01 Feb 2019 06:30) (85% - 98%)        01-31 @ 07:01 - 02-01 @ 07:00  --------------------------------------------------------  IN: 820 mL / OUT: 1000 mL / NET: -180 mL    02-01 @ 07:01 - 02-01 @ 13:08  --------------------------------------------------------  IN: 460 mL / OUT: 0 mL / NET: 460 mL      CAPILLARY BLOOD GLUCOSE      POCT Blood Glucose.: 222 mg/dL (01 Feb 2019 12:06)      PHYSICAL EXAM:  General: NAD  HEENT: NC/AT  Neck: supple  Respiratory:  + rhonchi and scattered crackles.  Cardiovascular:  RRR, no m/r/g  Abdomen: soft, NT/ND, +BS  Extremities: no clubbing, cyanosis or edema, warm  Skin: no rash  Neurological: AAOx3, non focal exam  Psychiatry: not anxious appearing, normal affect and mood    HOSPITAL MEDICATIONS:  MEDICATIONS  (STANDING):  apixaban 5 milliGRAM(s) Oral every 12 hours  buDESOnide 160 MICROgram(s)/formoterol 4.5 MICROgram(s) Inhaler 2 Puff(s) Inhalation two times a day  cefepime   IVPB 2000 milliGRAM(s) IV Intermittent every 8 hours  dextrose 5%. 1000 milliLiter(s) (50 mL/Hr) IV Continuous <Continuous>  dextrose 50% Injectable 12.5 Gram(s) IV Push once  dextrose 50% Injectable 25 Gram(s) IV Push once  dextrose 50% Injectable 25 Gram(s) IV Push once  digoxin     Tablet 0.25 milliGRAM(s) Oral daily  docusate sodium 100 milliGRAM(s) Oral three times a day  fluticasone propionate 50 MICROgram(s)/spray Nasal Spray 1 Spray(s) Both Nostrils two times a day  influenza   Vaccine 0.5 milliLiter(s) IntraMuscular once  insulin glargine Injectable (LANTUS) 15 Unit(s) SubCutaneous at bedtime  insulin lispro (HumaLOG) corrective regimen sliding scale   SubCutaneous three times a day before meals  insulin lispro (HumaLOG) corrective regimen sliding scale   SubCutaneous at bedtime  insulin lispro Injectable (HumaLOG) 8 Unit(s) SubCutaneous three times a day before meals  ipratropium 42 MICROgram(s) Nasal 1 Spray(s) Nasal three times a day  methylPREDNISolone sodium succinate Injectable 20 milliGRAM(s) IV Push every 12 hours  metroNIDAZOLE    Tablet 500 milliGRAM(s) Oral two times a day  montelukast 10 milliGRAM(s) Oral daily  pantoprazole    Tablet 40 milliGRAM(s) Oral before breakfast  polyethylene glycol 3350 17 Gram(s) Oral every 12 hours  senna 2 Tablet(s) Oral at bedtime  tiotropium 18 MICROgram(s) Capsule 1 Capsule(s) Inhalation daily    MEDICATIONS  (PRN):  acetaminophen   Tablet .. 975 milliGRAM(s) Oral every 6 hours PRN Temp greater or equal to 38C (100.4F), Mild Pain (1 - 3), Moderate Pain (4 - 6)  ALBUTerol/ipratropium for Nebulization 3 milliLiter(s) Nebulizer every 6 hours PRN Shortness of Breath and/or Wheezing  dextrose 40% Gel 15 Gram(s) Oral once PRN Blood Glucose LESS THAN 70 milliGRAM(s)/deciliter  glucagon  Injectable 1 milliGRAM(s) IntraMuscular once PRN Glucose LESS THAN 70 milligrams/deciliter  guaiFENesin  milliGRAM(s) Oral every 12 hours PRN Cough  metoclopramide 5 milliGRAM(s) Oral three times a day PRN Nausea  simethicone 80 milliGRAM(s) Chew four times a day PRN Gas  traMADol 25 milliGRAM(s) Oral every 4 hours PRN Severe Pain (7 - 10)      LABS:                        10.0   15.84 )-----------( 220      ( 31 Jan 2019 08:44 )             31.7     Hgb Trend: 10.0<--, 10.5<--, 11.4<--, 13.2<--  01-31    142  |  106  |  12  ----------------------------<  232<H>  4.2   |  27  |  0.50    Ca    8.6      31 Jan 2019 07:08      Creatinine Trend: 0.50<--, 0.51<--, 0.66<--, 0.65<--, 0.58<--, 0.57<--            MICROBIOLOGY: Culture - Other (01.31.19 @ 04:43)    Specimen Source: Skin rectal drainage    Culture Results:   Few Enterococcus species Identification and susceptibility to follow.  Few Corynebacterium species "Susceptibilities not performed"    Culture - Urine (01.29.19 @ 22:28)    Specimen Source: .Urine Clean Catch (Midstream)    Culture Results:   No growth    Culture - Blood (01.29.19 @ 22:07)    Specimen Source: .Blood Blood-Peripheral    Culture Results:   No growth to date.        RADIOLOGY:  [x ] Reviewed and interpreted by me  < from: CT Chest No Cont (01.29.19 @ 17:32) >  EXAM:  CT ABDOMEN AND PELVIS                          EXAM:  CT CHEST                            PROCEDURE DATE:  01/29/2019            INTERPRETATION:  CLINICAL INFORMATION: Sepsis, cough and shortness of   breath, suprapubic and right-sided abdominal pain.     COMPARISON: CT chest 1/12/2019, CT chest abdomen and pelvis 12/13/2018,   CT abdomen and pelvis 5/11/2017.    PROCEDURE:   CT of the Chest, Abdomen and Pelvis was performed with intravenous   contrast.   Intravenous contrast: 90 ml Omnipaque 350. 10 ml discarded.  Oral contrast: positive contrast was administered.  Sagittal and coronal reformats were performed.    FINDINGS:    CHEST:     LUNGS AND LARGE AIRWAYS: Trace secretions within the bronchus   intermedius. There are unchangedscattered tree-in-bud opacities in the   right middle and lower lobes, some of which are calcified likely   secondary to chronic mucoid impaction. A 1.0 cm right basilar   pleural-based nodule is unchanged from 12/13/2018.    PLEURA: No pleural effusion.    VESSELS: Right chest port with the tip in the SVC.     HEART: Heart size is normal. No pericardial effusion. Aortic valve and   coronary artery calcifications.    MEDIASTINUM AND MARANDA: No lymphadenopathy.    CHEST WALL AND LOWER NECK: Within normal limits.    ABDOMEN AND PELVIS:    LIVER: Within normal limits.  BILE DUCTS: Normal caliber.  GALLBLADDER: Within normal limits.  SPLEEN: Within normal limits.    PANCREAS: Two exophytic cystic lesions measuring 2.2 cm and 1.8 cm in the   tail ofthe pancreas are unchanged dating back to CT dated 5/11/2017.     ADRENALS: Within normal limits.    KIDNEYS/URETERS: Parenchymal calcifications within the left kidney noted   are indeterminate.      BLADDER: Within normal limits.    REPRODUCTIVE ORGANS: Hysterectomy.    BOWEL: Status post distal proctocolectomy with left lower quadrant   colostomy. Grossly stable postoperative changes in the distal colectomy   considering differences in technique compared to 12/13/2018, however,   please note the bowel is suboptimally evaluated without oral and IV   contrast. No bowel obstruction. Appendix is not visualized.    PERITONEUM: No ascites.    VESSELS:  Atheromatous disease of the abdominal aorta.    RETROPERITONEUM: No lymphadenopathy.      ABDOMINAL WALL: Calcified injection granulomas in the left inner gluteal   soft tissues. Heterotopic ossification along the lower midline anterior   abdominal wall is unchanged.    BONES: Degenerative changes of the spine. Sclerotic lesions in the T4,   T8, and T11 vertebral bodies are unchanged. Status post fixation of the   pubic symphysis and right sacroiliac joint.      IMPRESSION:   No interval change compared to prior exam of 12/13/2018, with details as   above.       MISTY FERRER M.D., RADIOLOGY RESIDENT  This document has been electronically signed.  MARAH YOO M.D., ATTENDING RADIOLOGIST  This document has been electronically signed. Jan 29 2019  6:08PM        < end of copied text >

## 2019-02-01 NOTE — PROGRESS NOTE ADULT - ASSESSMENT
Imp/Rx:  Fevers resolved.  unclear to me what is occurring at timi-rectal region---though per surgery, infection is felt to be less likely.  Not clear that pna present either.    Plan for cefepime and flagyl for now.  Likely next 3 days.    Await MRI      Receiving high dose cefepime in view of the pseudomonas colonization from prior.    ID covers this weekend   [id number]

## 2019-02-01 NOTE — PROGRESS NOTE ADULT - PROBLEM SELECTOR PLAN 3
Patient had visual disturbances last night, reports they are better today.  Seen by ophthalmology, attribute to a vest she was using for chest PT.  Patient has stopped using the vest.  Outpatient ophthalmology follow-up recommended.

## 2019-02-01 NOTE — CHART NOTE - NSCHARTNOTEFT_GEN_A_CORE
Pt c/o the same exact symptom as yesterday for which she was seen by opthalmology and recommended to follow up as OP. I spoke to opthalmology and they recommended that unless Pt is complaining of different or worsening symptoms there is no need to see her, but will be available if symptoms worsen or new symptoms develop.  pt was made aware and asked to let us know if there is any changes.   Will endorse to the night team and Dr. Estrada made aware.

## 2019-02-01 NOTE — DIETITIAN INITIAL EVALUATION ADULT. - OTHER INFO
Nutrition consult received for Hgba1C 8.3%. Pt recently evaluated for nutrition initial assessment (1/15/2019). States she had acute wt loss 2/2 receiving surgery last year, had wt loss from 172 pounds to 139 pounds in 2 months. Pt states she has been maintaining her wt, and in fact has been gaining wt to current 143 pounds. Pt reports fair appetite, 40-80% po intakes per flowsheet,  and requesting Glucerna 2 x day in case she does not finish her meals. No GI distress, colostomy output WDL per pt. Pt denies chewing/swallowing difficulties. Shellfish allergy noted. PTA did not take any supplements.

## 2019-02-01 NOTE — PROGRESS NOTE ADULT - ASSESSMENT
The patient is a 68-year-old woman with PMH of severe asthma/COPD, tracheomalacia s/p tracheo-bronchoplasty (2016), anal SCC s/p chemo/RT/proctectomy with colostomy, adrenal insufficiency on chronic medrol, CKD, afib on apixaban, T2DM, remote DVT, recent admission for coronavirus infection/COPD exacerbation/CAP presents with fever, increased sputum production, and dysuria x 1-2 days, admitted meeting SIRS criteria, suspicious for pulmonary vs urinary source.  Now with concern for perianal abscess.  Also with COPD exacerbation.

## 2019-02-01 NOTE — PROGRESS NOTE ADULT - SUBJECTIVE AND OBJECTIVE BOX
INFECTIOUS DISEASES FOLLOW UP--Sergio Lai MD  Pager [phone]    This is a follow up note for this  68y Female with fever, timi-anal inflammation/infection?.....and possible pna.  No fevers at this point.    Further ROS:  CONSTITUTIONAL:  No fever, good appetite  CARDIOVASCULAR:  No chest pain or palpitations  RESPIRATORY:  No dyspnea  GASTROINTESTINAL:  No nausea, vomiting, diarrhea, or abdominal pain  GENITOURINARY:  No dysuria  NEUROLOGIC:  No headache,     Allergies    ampicillin (Short breath)  aspirin (Short breath)  Avelox (Short breath; Pruritus)  codeine (Short breath)  Dilaudid (Short breath)  iodine (Short breath; Swelling)  penicillin (Short breath)  shellfish (Anaphylaxis)  tetanus toxoid (Short breath)  Valium (Short breath)    ANTIBIOTICS/RELEVANT:  antimicrobials  cefepime   IVPB 2000 milliGRAM(s) IV Intermittent every 8 hours  metroNIDAZOLE    Tablet 500 milliGRAM(s) Oral two times a day    immunologic:  influenza   Vaccine 0.5 milliLiter(s) IntraMuscular once    OTHER:  acetaminophen   Tablet .. 975 milliGRAM(s) Oral every 6 hours PRN  ALBUTerol/ipratropium for Nebulization 3 milliLiter(s) Nebulizer every 6 hours PRN  apixaban 5 milliGRAM(s) Oral every 12 hours  buDESOnide 160 MICROgram(s)/formoterol 4.5 MICROgram(s) Inhaler 2 Puff(s) Inhalation two times a day  dextrose 40% Gel 15 Gram(s) Oral once PRN  dextrose 5%. 1000 milliLiter(s) IV Continuous <Continuous>  dextrose 50% Injectable 12.5 Gram(s) IV Push once  dextrose 50% Injectable 25 Gram(s) IV Push once  dextrose 50% Injectable 25 Gram(s) IV Push once  digoxin     Tablet 0.25 milliGRAM(s) Oral daily  docusate sodium 100 milliGRAM(s) Oral three times a day  fluticasone propionate 50 MICROgram(s)/spray Nasal Spray 1 Spray(s) Both Nostrils two times a day  glucagon  Injectable 1 milliGRAM(s) IntraMuscular once PRN  guaiFENesin  milliGRAM(s) Oral every 12 hours PRN  insulin glargine Injectable (LANTUS) 15 Unit(s) SubCutaneous at bedtime  insulin lispro (HumaLOG) corrective regimen sliding scale   SubCutaneous three times a day before meals  insulin lispro (HumaLOG) corrective regimen sliding scale   SubCutaneous at bedtime  insulin lispro Injectable (HumaLOG) 8 Unit(s) SubCutaneous three times a day before meals  ipratropium 42 MICROgram(s) Nasal 1 Spray(s) Nasal three times a day  methylPREDNISolone 32 milliGRAM(s) Oral daily  metoclopramide 5 milliGRAM(s) Oral three times a day PRN  montelukast 10 milliGRAM(s) Oral daily  pantoprazole    Tablet 40 milliGRAM(s) Oral before breakfast  polyethylene glycol 3350 17 Gram(s) Oral every 12 hours  senna 2 Tablet(s) Oral at bedtime  simethicone 80 milliGRAM(s) Chew four times a day PRN  tiotropium 18 MICROgram(s) Capsule 1 Capsule(s) Inhalation daily  traMADol 25 milliGRAM(s) Oral every 4 hours PRN    Objective:  Vital Signs Last 24 Hrs  T(C): 36.9 (01 Feb 2019 11:46), Max: 36.9 (01 Feb 2019 11:46)  T(F): 98.5 (01 Feb 2019 11:46), Max: 98.5 (01 Feb 2019 11:46)  HR: 64 (01 Feb 2019 11:46) (55 - 81)  BP: 122/67 (01 Feb 2019 11:46) (100/43 - 149/71)  BP(mean): --  RR: 18 (01 Feb 2019 11:46) (17 - 18)  SpO2: 97% (01 Feb 2019 11:46) (85% - 98%)    PHYSICAL EXAM:  Constitutional:no acute distress  port site ok  Eyes:EBER, EOMI  Ear/Nose/Throat: no oral lesions, 	  Respiratory: clear BL  Cardiovascular: S1S2  Gastrointestinal:soft, (+) BS, no tenderness---Ostomy lower abd  Extremities:no e/e/c  No Lymphadenopathy  IV sites not inflammed.    LABS:                        10.0   15.84 )-----------( 220      ( 31 Jan 2019 08:44 )             31.7     01-31    142  |  106  |  12  ----------------------------<  232<H>  4.2   |  27  |  0.50    Ca    8.6      31 Jan 2019 07:08      MICROBIOLOGY: blood cx neg    RADIOLOGY & ADDITIONAL STUDIES:  MRI pending

## 2019-02-01 NOTE — DIETITIAN INITIAL EVALUATION ADULT. - ORAL INTAKE PTA
good/Pt reports to live with sister at home. Pt recently admitted and discharged, readmitted in 8 days, reports no acute changes in appetite and po intakes PTA. Pt's usual diet consists of poultry and fish, vegetables and starch

## 2019-02-02 LAB
-  AMPICILLIN: SIGNIFICANT CHANGE UP
-  TETRACYCLINE: SIGNIFICANT CHANGE UP
-  VANCOMYCIN: SIGNIFICANT CHANGE UP
CK MB CFR SERPL CALC: 1.2 NG/ML — SIGNIFICANT CHANGE UP (ref 0–3.8)
CK SERPL-CCNC: 34 U/L — SIGNIFICANT CHANGE UP (ref 25–170)
CULTURE RESULTS: SIGNIFICANT CHANGE UP
GLUCOSE BLDC GLUCOMTR-MCNC: 158 MG/DL — HIGH (ref 70–99)
GLUCOSE BLDC GLUCOMTR-MCNC: 172 MG/DL — HIGH (ref 70–99)
GLUCOSE BLDC GLUCOMTR-MCNC: 234 MG/DL — HIGH (ref 70–99)
GLUCOSE BLDC GLUCOMTR-MCNC: 335 MG/DL — HIGH (ref 70–99)
METHOD TYPE: SIGNIFICANT CHANGE UP
ORGANISM # SPEC MICROSCOPIC CNT: SIGNIFICANT CHANGE UP
ORGANISM # SPEC MICROSCOPIC CNT: SIGNIFICANT CHANGE UP
SPECIMEN SOURCE: SIGNIFICANT CHANGE UP
TROPONIN T, HIGH SENSITIVITY RESULT: 10 NG/L — SIGNIFICANT CHANGE UP (ref 0–51)
TROPONIN T, HIGH SENSITIVITY RESULT: 7 NG/L — SIGNIFICANT CHANGE UP (ref 0–51)

## 2019-02-02 PROCEDURE — 72196 MRI PELVIS W/DYE: CPT | Mod: 26

## 2019-02-02 PROCEDURE — 99233 SBSQ HOSP IP/OBS HIGH 50: CPT

## 2019-02-02 PROCEDURE — 93010 ELECTROCARDIOGRAM REPORT: CPT

## 2019-02-02 RX ORDER — INSULIN LISPRO 100/ML
4 VIAL (ML) SUBCUTANEOUS ONCE
Qty: 0 | Refills: 0 | Status: COMPLETED | OUTPATIENT
Start: 2019-02-02 | End: 2019-02-02

## 2019-02-02 RX ORDER — INSULIN GLARGINE 100 [IU]/ML
20 INJECTION, SOLUTION SUBCUTANEOUS AT BEDTIME
Qty: 0 | Refills: 0 | Status: DISCONTINUED | OUTPATIENT
Start: 2019-02-02 | End: 2019-02-06

## 2019-02-02 RX ORDER — ALPRAZOLAM 0.25 MG
0.25 TABLET ORAL ONCE
Qty: 0 | Refills: 0 | Status: DISCONTINUED | OUTPATIENT
Start: 2019-02-02 | End: 2019-02-02

## 2019-02-02 RX ADMIN — Medication 2: at 18:34

## 2019-02-02 RX ADMIN — Medication 32 MILLIGRAM(S): at 05:51

## 2019-02-02 RX ADMIN — Medication 1 SPRAY(S): at 18:38

## 2019-02-02 RX ADMIN — Medication 0.25 MILLIGRAM(S): at 05:50

## 2019-02-02 RX ADMIN — Medication 4: at 08:28

## 2019-02-02 RX ADMIN — Medication 500 MILLIGRAM(S): at 05:50

## 2019-02-02 RX ADMIN — Medication 8 UNIT(S): at 08:27

## 2019-02-02 RX ADMIN — POLYETHYLENE GLYCOL 3350 17 GRAM(S): 17 POWDER, FOR SOLUTION ORAL at 05:52

## 2019-02-02 RX ADMIN — CEFEPIME 100 MILLIGRAM(S): 1 INJECTION, POWDER, FOR SOLUTION INTRAMUSCULAR; INTRAVENOUS at 13:41

## 2019-02-02 RX ADMIN — PANTOPRAZOLE SODIUM 40 MILLIGRAM(S): 20 TABLET, DELAYED RELEASE ORAL at 05:53

## 2019-02-02 RX ADMIN — APIXABAN 5 MILLIGRAM(S): 2.5 TABLET, FILM COATED ORAL at 18:39

## 2019-02-02 RX ADMIN — BUDESONIDE AND FORMOTEROL FUMARATE DIHYDRATE 2 PUFF(S): 160; 4.5 AEROSOL RESPIRATORY (INHALATION) at 18:39

## 2019-02-02 RX ADMIN — Medication 100 MILLIGRAM(S): at 05:50

## 2019-02-02 RX ADMIN — MONTELUKAST 10 MILLIGRAM(S): 4 TABLET, CHEWABLE ORAL at 13:45

## 2019-02-02 RX ADMIN — APIXABAN 5 MILLIGRAM(S): 2.5 TABLET, FILM COATED ORAL at 05:51

## 2019-02-02 RX ADMIN — Medication 4 UNIT(S): at 13:29

## 2019-02-02 RX ADMIN — Medication 1 SPRAY(S): at 05:51

## 2019-02-02 RX ADMIN — CEFEPIME 100 MILLIGRAM(S): 1 INJECTION, POWDER, FOR SOLUTION INTRAMUSCULAR; INTRAVENOUS at 21:29

## 2019-02-02 RX ADMIN — Medication 500 MILLIGRAM(S): at 18:40

## 2019-02-02 RX ADMIN — BUDESONIDE AND FORMOTEROL FUMARATE DIHYDRATE 2 PUFF(S): 160; 4.5 AEROSOL RESPIRATORY (INHALATION) at 05:52

## 2019-02-02 RX ADMIN — INSULIN GLARGINE 20 UNIT(S): 100 INJECTION, SOLUTION SUBCUTANEOUS at 22:09

## 2019-02-02 RX ADMIN — TIOTROPIUM BROMIDE 1 CAPSULE(S): 18 CAPSULE ORAL; RESPIRATORY (INHALATION) at 13:42

## 2019-02-02 RX ADMIN — POLYETHYLENE GLYCOL 3350 17 GRAM(S): 17 POWDER, FOR SOLUTION ORAL at 18:40

## 2019-02-02 RX ADMIN — Medication 0.25 MILLIGRAM(S): at 23:05

## 2019-02-02 RX ADMIN — CEFEPIME 100 MILLIGRAM(S): 1 INJECTION, POWDER, FOR SOLUTION INTRAMUSCULAR; INTRAVENOUS at 05:50

## 2019-02-02 RX ADMIN — Medication 1 SPRAY(S): at 21:29

## 2019-02-02 RX ADMIN — Medication 8 UNIT(S): at 18:32

## 2019-02-02 NOTE — PROGRESS NOTE ADULT - PROBLEM SELECTOR PLAN 3
Patient had visual disturbances Thursday night  Seen by ophthalmology, attribute to a vest she was using for chest PT.  Patient has stopped using the vest.  Outpatient ophthalmology follow-up recommended.

## 2019-02-02 NOTE — PROGRESS NOTE ADULT - SUBJECTIVE AND OBJECTIVE BOX
Jamison Norman MD  ([facility] Hospitalist)  Pager [phone]  (During off hours please page [phone])      Patient is a 68y old  Female who presents with a chief complaint of fever, increased sputum production, dysuria (01 Feb 2019 15:56)      SUBJECTIVE / OVERNIGHT EVENTS: No events overnight. No new complaints. Still has a cough.     MEDICATIONS  (STANDING):  apixaban 5 milliGRAM(s) Oral every 12 hours  buDESOnide 160 MICROgram(s)/formoterol 4.5 MICROgram(s) Inhaler 2 Puff(s) Inhalation two times a day  cefepime   IVPB 2000 milliGRAM(s) IV Intermittent every 8 hours  dextrose 5%. 1000 milliLiter(s) (50 mL/Hr) IV Continuous <Continuous>  dextrose 50% Injectable 12.5 Gram(s) IV Push once  dextrose 50% Injectable 25 Gram(s) IV Push once  dextrose 50% Injectable 25 Gram(s) IV Push once  digoxin     Tablet 0.25 milliGRAM(s) Oral daily  docusate sodium 100 milliGRAM(s) Oral three times a day  fluticasone propionate 50 MICROgram(s)/spray Nasal Spray 1 Spray(s) Both Nostrils two times a day  influenza   Vaccine 0.5 milliLiter(s) IntraMuscular once  insulin glargine Injectable (LANTUS) 15 Unit(s) SubCutaneous at bedtime  insulin lispro (HumaLOG) corrective regimen sliding scale   SubCutaneous three times a day before meals  insulin lispro (HumaLOG) corrective regimen sliding scale   SubCutaneous at bedtime  insulin lispro Injectable (HumaLOG) 8 Unit(s) SubCutaneous three times a day before meals  ipratropium 42 MICROgram(s) Nasal 1 Spray(s) Nasal three times a day  methylPREDNISolone 32 milliGRAM(s) Oral daily  metroNIDAZOLE    Tablet 500 milliGRAM(s) Oral two times a day  montelukast 10 milliGRAM(s) Oral daily  pantoprazole    Tablet 40 milliGRAM(s) Oral before breakfast  polyethylene glycol 3350 17 Gram(s) Oral every 12 hours  senna 2 Tablet(s) Oral at bedtime  tiotropium 18 MICROgram(s) Capsule 1 Capsule(s) Inhalation daily    MEDICATIONS  (PRN):  acetaminophen   Tablet .. 975 milliGRAM(s) Oral every 6 hours PRN Temp greater or equal to 38C (100.4F), Mild Pain (1 - 3), Moderate Pain (4 - 6)  ALBUTerol/ipratropium for Nebulization 3 milliLiter(s) Nebulizer every 6 hours PRN Shortness of Breath and/or Wheezing  dextrose 40% Gel 15 Gram(s) Oral once PRN Blood Glucose LESS THAN 70 milliGRAM(s)/deciliter  glucagon  Injectable 1 milliGRAM(s) IntraMuscular once PRN Glucose LESS THAN 70 milligrams/deciliter  guaiFENesin  milliGRAM(s) Oral every 12 hours PRN Cough  metoclopramide 5 milliGRAM(s) Oral three times a day PRN Nausea  simethicone 80 milliGRAM(s) Chew four times a day PRN Gas  traMADol 25 milliGRAM(s) Oral every 4 hours PRN Severe Pain (7 - 10)      Vital Signs Last 24 Hrs  T(C): 36.7 (02 Feb 2019 04:03), Max: 36.9 (01 Feb 2019 11:46)  T(F): 98 (02 Feb 2019 04:03), Max: 98.5 (01 Feb 2019 11:46)  HR: 59 (02 Feb 2019 04:03) (59 - 75)  BP: 121/69 (02 Feb 2019 04:03) (121/69 - 128/74)  BP(mean): --  RR: 18 (02 Feb 2019 04:03) (18 - 18)  SpO2: 98% (02 Feb 2019 04:03) (97% - 98%)  CAPILLARY BLOOD GLUCOSE      POCT Blood Glucose.: 234 mg/dL (02 Feb 2019 08:14)  POCT Blood Glucose.: 233 mg/dL (01 Feb 2019 22:05)  POCT Blood Glucose.: 127 mg/dL (01 Feb 2019 16:52)  POCT Blood Glucose.: 222 mg/dL (01 Feb 2019 12:06)    I&O's Summary    01 Feb 2019 07:01  -  02 Feb 2019 07:00  --------------------------------------------------------  IN: 1000 mL / OUT: 1450 mL / NET: -450 mL        PHYSICAL EXAM:  GENERAL: Looks stated age, NAD.  CARDIOVASCULAR: Normal S1, S2, rrr  PULMONARY: Diffuse end expiratory rhonchi  GI: Abdomen soft, Nontender. Bowel sounds present.  Dressing over rectal area saturated with tannish colored drainage, but no active drainage noted.  MSK/Ext:  No leg edema.  No calf tenderness bilaterally  PSYCH: Normal Affect. AAOx3        LABS:                        11.4   8.36  )-----------( 268      ( 01 Feb 2019 21:30 )             36.6           RADIOLOGY & ADDITIONAL TESTS:    Imaging Personally Reviewed:   MRI abdomen/pelvis pending.     Consultant(s) Notes Reviewed: ID, pulmonology

## 2019-02-02 NOTE — PROGRESS NOTE ADULT - ASSESSMENT
68M severe asthma/COPD, tracheomalacia s/p tracheo-bronchoplasty (2016), anal SCC s/p chemo/RT/proctectomy with colostomy, adrenal insufficiency on chronic medrol, CKD, afib on apixaban, T2DM, remote DVT, recent admission for coronavirus infection/COPD exacerbation/CAP presents with fever, increased sputum production, and dysuria x 1-2 days, admitted meeting SIRS criteria, suspicious for pulmonary vs urinary source.  Now with concern for perianal abscess.  Also with COPD exacerbation.

## 2019-02-03 LAB
ANION GAP SERPL CALC-SCNC: 13 MMOL/L — SIGNIFICANT CHANGE UP (ref 5–17)
BUN SERPL-MCNC: 17 MG/DL — SIGNIFICANT CHANGE UP (ref 7–23)
CALCIUM SERPL-MCNC: 9 MG/DL — SIGNIFICANT CHANGE UP (ref 8.4–10.5)
CHLORIDE SERPL-SCNC: 101 MMOL/L — SIGNIFICANT CHANGE UP (ref 96–108)
CO2 SERPL-SCNC: 25 MMOL/L — SIGNIFICANT CHANGE UP (ref 22–31)
CREAT SERPL-MCNC: 0.6 MG/DL — SIGNIFICANT CHANGE UP (ref 0.5–1.3)
CULTURE RESULTS: SIGNIFICANT CHANGE UP
CULTURE RESULTS: SIGNIFICANT CHANGE UP
GLUCOSE BLDC GLUCOMTR-MCNC: 150 MG/DL — HIGH (ref 70–99)
GLUCOSE BLDC GLUCOMTR-MCNC: 152 MG/DL — HIGH (ref 70–99)
GLUCOSE BLDC GLUCOMTR-MCNC: 182 MG/DL — HIGH (ref 70–99)
GLUCOSE BLDC GLUCOMTR-MCNC: 308 MG/DL — HIGH (ref 70–99)
GLUCOSE SERPL-MCNC: 243 MG/DL — HIGH (ref 70–99)
HCT VFR BLD CALC: 37.5 % — SIGNIFICANT CHANGE UP (ref 34.5–45)
HGB BLD-MCNC: 11.5 G/DL — SIGNIFICANT CHANGE UP (ref 11.5–15.5)
MCHC RBC-ENTMCNC: 22.1 PG — LOW (ref 27–34)
MCHC RBC-ENTMCNC: 30.7 GM/DL — LOW (ref 32–36)
MCV RBC AUTO: 72 FL — LOW (ref 80–100)
PLATELET # BLD AUTO: 367 K/UL — SIGNIFICANT CHANGE UP (ref 150–400)
POTASSIUM SERPL-MCNC: 4.5 MMOL/L — SIGNIFICANT CHANGE UP (ref 3.5–5.3)
POTASSIUM SERPL-SCNC: 4.5 MMOL/L — SIGNIFICANT CHANGE UP (ref 3.5–5.3)
RBC # BLD: 5.21 M/UL — HIGH (ref 3.8–5.2)
RBC # FLD: 18.2 % — HIGH (ref 10.3–14.5)
SODIUM SERPL-SCNC: 139 MMOL/L — SIGNIFICANT CHANGE UP (ref 135–145)
SPECIMEN SOURCE: SIGNIFICANT CHANGE UP
SPECIMEN SOURCE: SIGNIFICANT CHANGE UP
WBC # BLD: 10.05 K/UL — SIGNIFICANT CHANGE UP (ref 3.8–10.5)
WBC # FLD AUTO: 10.05 K/UL — SIGNIFICANT CHANGE UP (ref 3.8–10.5)

## 2019-02-03 PROCEDURE — 99233 SBSQ HOSP IP/OBS HIGH 50: CPT

## 2019-02-03 PROCEDURE — 99233 SBSQ HOSP IP/OBS HIGH 50: CPT | Mod: GC

## 2019-02-03 RX ORDER — ALTEPLASE 100 MG
2 KIT INTRAVENOUS ONCE
Qty: 0 | Refills: 0 | Status: COMPLETED | OUTPATIENT
Start: 2019-02-03 | End: 2019-02-03

## 2019-02-03 RX ADMIN — Medication 100 MILLIGRAM(S): at 14:15

## 2019-02-03 RX ADMIN — PANTOPRAZOLE SODIUM 40 MILLIGRAM(S): 20 TABLET, DELAYED RELEASE ORAL at 05:50

## 2019-02-03 RX ADMIN — Medication 2: at 08:15

## 2019-02-03 RX ADMIN — Medication 100 MILLIGRAM(S): at 21:50

## 2019-02-03 RX ADMIN — Medication 8 UNIT(S): at 08:16

## 2019-02-03 RX ADMIN — Medication 8 UNIT(S): at 17:18

## 2019-02-03 RX ADMIN — BUDESONIDE AND FORMOTEROL FUMARATE DIHYDRATE 2 PUFF(S): 160; 4.5 AEROSOL RESPIRATORY (INHALATION) at 17:21

## 2019-02-03 RX ADMIN — Medication 8 UNIT(S): at 12:49

## 2019-02-03 RX ADMIN — CEFEPIME 100 MILLIGRAM(S): 1 INJECTION, POWDER, FOR SOLUTION INTRAMUSCULAR; INTRAVENOUS at 14:47

## 2019-02-03 RX ADMIN — Medication 1 SPRAY(S): at 14:14

## 2019-02-03 RX ADMIN — POLYETHYLENE GLYCOL 3350 17 GRAM(S): 17 POWDER, FOR SOLUTION ORAL at 17:21

## 2019-02-03 RX ADMIN — Medication 500 MILLIGRAM(S): at 17:19

## 2019-02-03 RX ADMIN — POLYETHYLENE GLYCOL 3350 17 GRAM(S): 17 POWDER, FOR SOLUTION ORAL at 05:49

## 2019-02-03 RX ADMIN — Medication 8: at 12:48

## 2019-02-03 RX ADMIN — Medication 2: at 17:17

## 2019-02-03 RX ADMIN — MONTELUKAST 10 MILLIGRAM(S): 4 TABLET, CHEWABLE ORAL at 11:27

## 2019-02-03 RX ADMIN — Medication 1 SPRAY(S): at 21:50

## 2019-02-03 RX ADMIN — Medication 100 MILLIGRAM(S): at 05:50

## 2019-02-03 RX ADMIN — Medication 1 SPRAY(S): at 05:48

## 2019-02-03 RX ADMIN — Medication 500 MILLIGRAM(S): at 05:49

## 2019-02-03 RX ADMIN — CEFEPIME 100 MILLIGRAM(S): 1 INJECTION, POWDER, FOR SOLUTION INTRAMUSCULAR; INTRAVENOUS at 21:50

## 2019-02-03 RX ADMIN — BUDESONIDE AND FORMOTEROL FUMARATE DIHYDRATE 2 PUFF(S): 160; 4.5 AEROSOL RESPIRATORY (INHALATION) at 05:48

## 2019-02-03 RX ADMIN — ALTEPLASE 2 MILLIGRAM(S): KIT at 12:40

## 2019-02-03 RX ADMIN — Medication 0.25 MILLIGRAM(S): at 05:49

## 2019-02-03 RX ADMIN — INSULIN GLARGINE 20 UNIT(S): 100 INJECTION, SOLUTION SUBCUTANEOUS at 21:51

## 2019-02-03 RX ADMIN — CEFEPIME 100 MILLIGRAM(S): 1 INJECTION, POWDER, FOR SOLUTION INTRAMUSCULAR; INTRAVENOUS at 05:49

## 2019-02-03 RX ADMIN — APIXABAN 5 MILLIGRAM(S): 2.5 TABLET, FILM COATED ORAL at 05:49

## 2019-02-03 RX ADMIN — APIXABAN 5 MILLIGRAM(S): 2.5 TABLET, FILM COATED ORAL at 17:20

## 2019-02-03 RX ADMIN — ALTEPLASE 2 MILLIGRAM(S): KIT at 01:40

## 2019-02-03 RX ADMIN — Medication 32 MILLIGRAM(S): at 05:49

## 2019-02-03 RX ADMIN — SENNA PLUS 2 TABLET(S): 8.6 TABLET ORAL at 21:50

## 2019-02-03 RX ADMIN — TIOTROPIUM BROMIDE 1 CAPSULE(S): 18 CAPSULE ORAL; RESPIRATORY (INHALATION) at 11:27

## 2019-02-03 RX ADMIN — Medication 600 MILLIGRAM(S): at 11:26

## 2019-02-03 NOTE — PROGRESS NOTE ADULT - SUBJECTIVE AND OBJECTIVE BOX
CHIEF COMPLAINT:     Interval Events:    REVIEW OF SYSTEMS:  Constitutional:   Eyes:  ENT:  CV:  Resp:  GI:  :  MSK:  Integumentary:  Neurological:  Psychiatric:  Endocrine:  Hematologic/Lymphatic:  Allergic/Immunologic:  [ ] All other systems negative  [ ] Unable to assess ROS because ________    OBJECTIVE:  ICU Vital Signs Last 24 Hrs  T(C): 36.7 (03 Feb 2019 14:30), Max: 36.9 (02 Feb 2019 21:04)  T(F): 98 (03 Feb 2019 14:30), Max: 98.5 (02 Feb 2019 21:04)  HR: 68 (03 Feb 2019 14:30) (59 - 86)  BP: 122/73 (03 Feb 2019 14:30) (114/57 - 122/73)  BP(mean): --  ABP: --  ABP(mean): --  RR: 18 (03 Feb 2019 14:30) (18 - 18)  SpO2: 98% (03 Feb 2019 14:30) (96% - 98%)        02-02 @ 07:01 - 02-03 @ 07:00  --------------------------------------------------------  IN: 240 mL / OUT: 250 mL / NET: -10 mL    02-03 @ 07:01  - 02-03 @ 18:14  --------------------------------------------------------  IN: 250 mL / OUT: 0 mL / NET: 250 mL      CAPILLARY BLOOD GLUCOSE      POCT Blood Glucose.: 152 mg/dL (03 Feb 2019 16:47)      PHYSICAL EXAM:  General:   HEENT:   Lymph Nodes:  Neck:   Respiratory:   Cardiovascular:   Abdomen:   Extremities:   Skin:   Neurological:  Psychiatry:    HOSPITAL MEDICATIONS:  MEDICATIONS  (STANDING):  apixaban 5 milliGRAM(s) Oral every 12 hours  buDESOnide 160 MICROgram(s)/formoterol 4.5 MICROgram(s) Inhaler 2 Puff(s) Inhalation two times a day  cefepime   IVPB 2000 milliGRAM(s) IV Intermittent every 8 hours  dextrose 5%. 1000 milliLiter(s) (50 mL/Hr) IV Continuous <Continuous>  dextrose 50% Injectable 12.5 Gram(s) IV Push once  dextrose 50% Injectable 25 Gram(s) IV Push once  dextrose 50% Injectable 25 Gram(s) IV Push once  digoxin     Tablet 0.25 milliGRAM(s) Oral daily  docusate sodium 100 milliGRAM(s) Oral three times a day  fluticasone propionate 50 MICROgram(s)/spray Nasal Spray 1 Spray(s) Both Nostrils two times a day  influenza   Vaccine 0.5 milliLiter(s) IntraMuscular once  insulin glargine Injectable (LANTUS) 20 Unit(s) SubCutaneous at bedtime  insulin lispro (HumaLOG) corrective regimen sliding scale   SubCutaneous three times a day before meals  insulin lispro (HumaLOG) corrective regimen sliding scale   SubCutaneous at bedtime  insulin lispro Injectable (HumaLOG) 8 Unit(s) SubCutaneous three times a day before meals  ipratropium 42 MICROgram(s) Nasal 1 Spray(s) Nasal three times a day  methylPREDNISolone 32 milliGRAM(s) Oral daily  metroNIDAZOLE    Tablet 500 milliGRAM(s) Oral two times a day  montelukast 10 milliGRAM(s) Oral daily  pantoprazole    Tablet 40 milliGRAM(s) Oral before breakfast  polyethylene glycol 3350 17 Gram(s) Oral every 12 hours  senna 2 Tablet(s) Oral at bedtime  tiotropium 18 MICROgram(s) Capsule 1 Capsule(s) Inhalation daily    MEDICATIONS  (PRN):  acetaminophen   Tablet .. 975 milliGRAM(s) Oral every 6 hours PRN Temp greater or equal to 38C (100.4F), Mild Pain (1 - 3), Moderate Pain (4 - 6)  ALBUTerol/ipratropium for Nebulization 3 milliLiter(s) Nebulizer every 6 hours PRN Shortness of Breath and/or Wheezing  dextrose 40% Gel 15 Gram(s) Oral once PRN Blood Glucose LESS THAN 70 milliGRAM(s)/deciliter  glucagon  Injectable 1 milliGRAM(s) IntraMuscular once PRN Glucose LESS THAN 70 milligrams/deciliter  guaiFENesin  milliGRAM(s) Oral every 12 hours PRN Cough  metoclopramide 5 milliGRAM(s) Oral three times a day PRN Nausea  simethicone 80 milliGRAM(s) Chew four times a day PRN Gas  traMADol 25 milliGRAM(s) Oral every 4 hours PRN Severe Pain (7 - 10)      LABS:                        11.5   10.05 )-----------( 367      ( 03 Feb 2019 17:20 )             37.5     02-03    139  |  101  |  17  ----------------------------<  243<H>  4.5   |  25  |  0.60    Ca    9.0      03 Feb 2019 15:24                MICROBIOLOGY:     RADIOLOGY:  [ ] Reviewed and interpreted by me    PULMONARY FUNCTION TESTS:    EKG: CHIEF COMPLAINT: minimal cough     Interval Events:  Interval Events: Continues to have rectal fissure seropurulent drainage, having to change dressing every 2-3 hours. Afebrile. Respiratory status at baseline     REVIEW OF SYSTEMS:  CV: [- ] chest pain   Resp: [ +] cough [- ] shortness of breath   [x ] All other systems negative  [ ] Unable to assess ROS because ________      OBJECTIVE:  ICU Vital Signs Last 24 Hrs  T(C): 36.7 (03 Feb 2019 14:30), Max: 36.9 (02 Feb 2019 21:04)  T(F): 98 (03 Feb 2019 14:30), Max: 98.5 (02 Feb 2019 21:04)  HR: 68 (03 Feb 2019 14:30) (59 - 86)  BP: 122/73 (03 Feb 2019 14:30) (114/57 - 122/73)  BP(mean): --  ABP: --  ABP(mean): --  RR: 18 (03 Feb 2019 14:30) (18 - 18)  SpO2: 98% (03 Feb 2019 14:30) (96% - 98%)        02-02 @ 07:01 - 02-03 @ 07:00  --------------------------------------------------------  IN: 240 mL / OUT: 250 mL / NET: -10 mL    02-03 @ 07:01  - 02-03 @ 18:14  --------------------------------------------------------  IN: 250 mL / OUT: 0 mL / NET: 250 mL      CAPILLARY BLOOD GLUCOSE      POCT Blood Glucose.: 152 mg/dL (03 Feb 2019 16:47)      PHYSICAL EXAM:  General:   HEENT:   Lymph Nodes:  Neck:   Respiratory:   Cardiovascular:   Abdomen:   Extremities:   Skin:   Neurological:  Psychiatry:    HOSPITAL MEDICATIONS:  MEDICATIONS  (STANDING):  apixaban 5 milliGRAM(s) Oral every 12 hours  buDESOnide 160 MICROgram(s)/formoterol 4.5 MICROgram(s) Inhaler 2 Puff(s) Inhalation two times a day  cefepime   IVPB 2000 milliGRAM(s) IV Intermittent every 8 hours  dextrose 5%. 1000 milliLiter(s) (50 mL/Hr) IV Continuous <Continuous>  dextrose 50% Injectable 12.5 Gram(s) IV Push once  dextrose 50% Injectable 25 Gram(s) IV Push once  dextrose 50% Injectable 25 Gram(s) IV Push once  digoxin     Tablet 0.25 milliGRAM(s) Oral daily  docusate sodium 100 milliGRAM(s) Oral three times a day  fluticasone propionate 50 MICROgram(s)/spray Nasal Spray 1 Spray(s) Both Nostrils two times a day  influenza   Vaccine 0.5 milliLiter(s) IntraMuscular once  insulin glargine Injectable (LANTUS) 20 Unit(s) SubCutaneous at bedtime  insulin lispro (HumaLOG) corrective regimen sliding scale   SubCutaneous three times a day before meals  insulin lispro (HumaLOG) corrective regimen sliding scale   SubCutaneous at bedtime  insulin lispro Injectable (HumaLOG) 8 Unit(s) SubCutaneous three times a day before meals  ipratropium 42 MICROgram(s) Nasal 1 Spray(s) Nasal three times a day  methylPREDNISolone 32 milliGRAM(s) Oral daily  metroNIDAZOLE    Tablet 500 milliGRAM(s) Oral two times a day  montelukast 10 milliGRAM(s) Oral daily  pantoprazole    Tablet 40 milliGRAM(s) Oral before breakfast  polyethylene glycol 3350 17 Gram(s) Oral every 12 hours  senna 2 Tablet(s) Oral at bedtime  tiotropium 18 MICROgram(s) Capsule 1 Capsule(s) Inhalation daily    MEDICATIONS  (PRN):  acetaminophen   Tablet .. 975 milliGRAM(s) Oral every 6 hours PRN Temp greater or equal to 38C (100.4F), Mild Pain (1 - 3), Moderate Pain (4 - 6)  ALBUTerol/ipratropium for Nebulization 3 milliLiter(s) Nebulizer every 6 hours PRN Shortness of Breath and/or Wheezing  dextrose 40% Gel 15 Gram(s) Oral once PRN Blood Glucose LESS THAN 70 milliGRAM(s)/deciliter  glucagon  Injectable 1 milliGRAM(s) IntraMuscular once PRN Glucose LESS THAN 70 milligrams/deciliter  guaiFENesin  milliGRAM(s) Oral every 12 hours PRN Cough  metoclopramide 5 milliGRAM(s) Oral three times a day PRN Nausea  simethicone 80 milliGRAM(s) Chew four times a day PRN Gas  traMADol 25 milliGRAM(s) Oral every 4 hours PRN Severe Pain (7 - 10)      LABS:                        11.5   10.05 )-----------( 367      ( 03 Feb 2019 17:20 )             37.5     02-03    139  |  101  |  17  ----------------------------<  243<H>  4.5   |  25  |  0.60    Ca    9.0      03 Feb 2019 15:24                MICROBIOLOGY:     RADIOLOGY:  [ ] Reviewed and interpreted by me    PULMONARY FUNCTION TESTS:    EKG: CHIEF COMPLAINT: minimal cough     Interval Events:  Interval Events: Continues to have rectal fissure seropurulent drainage, having to change dressing every 2-3 hours. Afebrile. Respiratory status at baseline     REVIEW OF SYSTEMS:  CV: [- ] chest pain   Resp: [ +] cough [- ] shortness of breath   [x ] All other systems negative  [ ] Unable to assess ROS because ________      OBJECTIVE:  ICU Vital Signs Last 24 Hrs  T(C): 36.7 (03 Feb 2019 14:30), Max: 36.9 (02 Feb 2019 21:04)  T(F): 98 (03 Feb 2019 14:30), Max: 98.5 (02 Feb 2019 21:04)  HR: 68 (03 Feb 2019 14:30) (59 - 86)  BP: 122/73 (03 Feb 2019 14:30) (114/57 - 122/73)  BP(mean): --  ABP: --  ABP(mean): --  RR: 18 (03 Feb 2019 14:30) (18 - 18)  SpO2: 98% (03 Feb 2019 14:30) (96% - 98%)        02-02 @ 07:01  -  02-03 @ 07:00  --------------------------------------------------------  IN: 240 mL / OUT: 250 mL / NET: -10 mL    02-03 @ 07:01 - 02-03 @ 18:14  --------------------------------------------------------  IN: 250 mL / OUT: 0 mL / NET: 250 mL      CAPILLARY BLOOD GLUCOSE      POCT Blood Glucose.: 152 mg/dL (03 Feb 2019 16:47)      PHYSICAL EXAM:  GENERAL: NAD.  CARDIOVASCULAR: Normal S1, S2, rrr  CHEST: Diffuse end expiratory rhonchi  GI: Abdomen soft, Nontender. Bowel sounds present.  Dressing over rectal area c/d.i  MSK/Ext:  No edema, clubbing or cyanosis  PSYCH: Normal Affect. AAOx3    HOSPITAL MEDICATIONS:  MEDICATIONS  (STANDING):  apixaban 5 milliGRAM(s) Oral every 12 hours  buDESOnide 160 MICROgram(s)/formoterol 4.5 MICROgram(s) Inhaler 2 Puff(s) Inhalation two times a day  cefepime   IVPB 2000 milliGRAM(s) IV Intermittent every 8 hours  dextrose 5%. 1000 milliLiter(s) (50 mL/Hr) IV Continuous <Continuous>  dextrose 50% Injectable 12.5 Gram(s) IV Push once  dextrose 50% Injectable 25 Gram(s) IV Push once  dextrose 50% Injectable 25 Gram(s) IV Push once  digoxin     Tablet 0.25 milliGRAM(s) Oral daily  docusate sodium 100 milliGRAM(s) Oral three times a day  fluticasone propionate 50 MICROgram(s)/spray Nasal Spray 1 Spray(s) Both Nostrils two times a day  influenza   Vaccine 0.5 milliLiter(s) IntraMuscular once  insulin glargine Injectable (LANTUS) 20 Unit(s) SubCutaneous at bedtime  insulin lispro (HumaLOG) corrective regimen sliding scale   SubCutaneous three times a day before meals  insulin lispro (HumaLOG) corrective regimen sliding scale   SubCutaneous at bedtime  insulin lispro Injectable (HumaLOG) 8 Unit(s) SubCutaneous three times a day before meals  ipratropium 42 MICROgram(s) Nasal 1 Spray(s) Nasal three times a day  methylPREDNISolone 32 milliGRAM(s) Oral daily  metroNIDAZOLE    Tablet 500 milliGRAM(s) Oral two times a day  montelukast 10 milliGRAM(s) Oral daily  pantoprazole    Tablet 40 milliGRAM(s) Oral before breakfast  polyethylene glycol 3350 17 Gram(s) Oral every 12 hours  senna 2 Tablet(s) Oral at bedtime  tiotropium 18 MICROgram(s) Capsule 1 Capsule(s) Inhalation daily    MEDICATIONS  (PRN):  acetaminophen   Tablet .. 975 milliGRAM(s) Oral every 6 hours PRN Temp greater or equal to 38C (100.4F), Mild Pain (1 - 3), Moderate Pain (4 - 6)  ALBUTerol/ipratropium for Nebulization 3 milliLiter(s) Nebulizer every 6 hours PRN Shortness of Breath and/or Wheezing  dextrose 40% Gel 15 Gram(s) Oral once PRN Blood Glucose LESS THAN 70 milliGRAM(s)/deciliter  glucagon  Injectable 1 milliGRAM(s) IntraMuscular once PRN Glucose LESS THAN 70 milligrams/deciliter  guaiFENesin  milliGRAM(s) Oral every 12 hours PRN Cough  metoclopramide 5 milliGRAM(s) Oral three times a day PRN Nausea  simethicone 80 milliGRAM(s) Chew four times a day PRN Gas  traMADol 25 milliGRAM(s) Oral every 4 hours PRN Severe Pain (7 - 10)      LABS:                        11.5   10.05 )-----------( 367      ( 03 Feb 2019 17:20 )             37.5     02-03    139  |  101  |  17  ----------------------------<  243<H>  4.5   |  25  |  0.60    Ca    9.0      03 Feb 2019 15:24                MICROBIOLOGY:     RADIOLOGY:  [ ] Reviewed and interpreted by me    PULMONARY FUNCTION TESTS:    EKG: CHIEF COMPLAINT: minimal cough     Interval Events: 68M severe asthma/COPD, tracheomalacia s/p tracheo-bronchoplasty (2016), anal SCC s/p chemo/RT/proctectomy with colostomy, adrenal insufficiency on chronic medrol, CKD, afib on apixaban, T2DM, remote DVT, recent admission for coronavirus infection/COPD exacerbation/CAP presents with fever, increased sputum production, and dysuria x 1-2 days, admitted meeting SIRS criteria, suspicious for perianal abscess ? rectal fistula  ----on antibiotics as per ID       REVIEW OF SYSTEMS:  CV: [- ] chest pain   Resp: [ +] cough [- ] shortness of breath   [x ] All other systems negative  [ ] Unable to assess ROS because ________      OBJECTIVE:  ICU Vital Signs Last 24 Hrs  T(C): 36.7 (03 Feb 2019 14:30), Max: 36.9 (02 Feb 2019 21:04)  T(F): 98 (03 Feb 2019 14:30), Max: 98.5 (02 Feb 2019 21:04)  HR: 68 (03 Feb 2019 14:30) (59 - 86)  BP: 122/73 (03 Feb 2019 14:30) (114/57 - 122/73)  BP(mean): --  ABP: --  ABP(mean): --  RR: 18 (03 Feb 2019 14:30) (18 - 18)  SpO2: 98% (03 Feb 2019 14:30) (96% - 98%)        02-02 @ 07:01  -  02-03 @ 07:00  --------------------------------------------------------  IN: 240 mL / OUT: 250 mL / NET: -10 mL    02-03 @ 07:01  -  02-03 @ 18:14  --------------------------------------------------------  IN: 250 mL / OUT: 0 mL / NET: 250 mL      CAPILLARY BLOOD GLUCOSE      POCT Blood Glucose.: 152 mg/dL (03 Feb 2019 16:47)      PHYSICAL EXAM:  GENERAL: NAD.  CARDIOVASCULAR: Normal S1, S2, rrr  CHEST: Diffuse end expiratory rhonchi  GI: Abdomen soft, Nontender. Bowel sounds present.  Dressing over rectal area c/d.i  MSK/Ext:  No edema, clubbing or cyanosis  PSYCH: Normal Affect. AAOx3    HOSPITAL MEDICATIONS:  MEDICATIONS  (STANDING):  apixaban 5 milliGRAM(s) Oral every 12 hours  buDESOnide 160 MICROgram(s)/formoterol 4.5 MICROgram(s) Inhaler 2 Puff(s) Inhalation two times a day  cefepime   IVPB 2000 milliGRAM(s) IV Intermittent every 8 hours  dextrose 5%. 1000 milliLiter(s) (50 mL/Hr) IV Continuous <Continuous>  dextrose 50% Injectable 12.5 Gram(s) IV Push once  dextrose 50% Injectable 25 Gram(s) IV Push once  dextrose 50% Injectable 25 Gram(s) IV Push once  digoxin     Tablet 0.25 milliGRAM(s) Oral daily  docusate sodium 100 milliGRAM(s) Oral three times a day  fluticasone propionate 50 MICROgram(s)/spray Nasal Spray 1 Spray(s) Both Nostrils two times a day  influenza   Vaccine 0.5 milliLiter(s) IntraMuscular once  insulin glargine Injectable (LANTUS) 20 Unit(s) SubCutaneous at bedtime  insulin lispro (HumaLOG) corrective regimen sliding scale   SubCutaneous three times a day before meals  insulin lispro (HumaLOG) corrective regimen sliding scale   SubCutaneous at bedtime  insulin lispro Injectable (HumaLOG) 8 Unit(s) SubCutaneous three times a day before meals  ipratropium 42 MICROgram(s) Nasal 1 Spray(s) Nasal three times a day  methylPREDNISolone 32 milliGRAM(s) Oral daily  metroNIDAZOLE    Tablet 500 milliGRAM(s) Oral two times a day  montelukast 10 milliGRAM(s) Oral daily  pantoprazole    Tablet 40 milliGRAM(s) Oral before breakfast  polyethylene glycol 3350 17 Gram(s) Oral every 12 hours  senna 2 Tablet(s) Oral at bedtime  tiotropium 18 MICROgram(s) Capsule 1 Capsule(s) Inhalation daily    MEDICATIONS  (PRN):  acetaminophen   Tablet .. 975 milliGRAM(s) Oral every 6 hours PRN Temp greater or equal to 38C (100.4F), Mild Pain (1 - 3), Moderate Pain (4 - 6)  ALBUTerol/ipratropium for Nebulization 3 milliLiter(s) Nebulizer every 6 hours PRN Shortness of Breath and/or Wheezing  dextrose 40% Gel 15 Gram(s) Oral once PRN Blood Glucose LESS THAN 70 milliGRAM(s)/deciliter  glucagon  Injectable 1 milliGRAM(s) IntraMuscular once PRN Glucose LESS THAN 70 milligrams/deciliter  guaiFENesin  milliGRAM(s) Oral every 12 hours PRN Cough  metoclopramide 5 milliGRAM(s) Oral three times a day PRN Nausea  simethicone 80 milliGRAM(s) Chew four times a day PRN Gas  traMADol 25 milliGRAM(s) Oral every 4 hours PRN Severe Pain (7 - 10)      LABS:                        11.5   10.05 )-----------( 367      ( 03 Feb 2019 17:20 )             37.5     02-03    139  |  101  |  17  ----------------------------<  243<H>  4.5   |  25  |  0.60    Ca    9.0      03 Feb 2019 15:24    < from: CT Chest No Cont (01.29.19 @ 17:32) >    LUNGS AND LARGE AIRWAYS: Trace secretions within the bronchus   intermedius. There are unchangedscattered tree-in-bud opacities in the   right middle and lower lobes, some of which are calcified likely   secondary to chronic mucoid impaction. A 1.0 cm right basilar   pleural-based nodule is unchanged from 12/13/2018.    PLEURA: No pleural effusion.    VESSELS: Right chest port with the tip in the SVC.     HEART: Heart size is normal. No pericardial effusion. Aortic valve and   coronary artery calcifications.    MEDIASTINUM AND MARANDA: No lymphadenopathy.    CHEST WALL AND LOWER NECK: Within normal limits.    ABDOMEN AND PELVIS:    LIVER: Within normal limits.  BILE DUCTS: Normal caliber.  GALLBLADDER: Within normal limits.  SPLEEN: Within normal limits.    PANCREAS: Two exophytic cystic lesions measuring 2.2 cm and 1.8 cm in the   tail ofthe pancreas are unchanged dating back to CT dated 5/11/2017.     ADRENALS: Within normal limits.    KIDNEYS/URETERS: Parenchymal calcifications within the left kidney noted   are indeterminate.      BLADDER: Within normal limits.    REPRODUCTIVE ORGANS: Hysterectomy.    BOWEL: Status post distal proctocolectomy with left lower quadrant   colostomy. Grossly stable postoperative changes in the distal colectomy   considering differences in technique compared to 12/13/2018, however,   please note the bowel is suboptimally evaluated without oral and IV   contrast. No bowel obstruction. Appendix is not visualized.    PERITONEUM: No ascites.    VESSELS:  Atheromatous disease of the abdominal aorta.    RETROPERITONEUM: No lymphadenopathy.      ABDOMINAL WALL: Calcified injection granulomas in the left inner gluteal   soft tissues. Heterotopic ossification along the lower midline anterior   abdominal wall is unchanged.    BONES: Degenerative changes of the spine. Sclerotic lesions in the T4,   T8, and T11 vertebral bodies are unchanged. Status post fixation of the   pubic symphysis and right sacroiliac joint.      IMPRESSION:   No interval change compared to prior exam of 12/13/2018, with details as   above.     < end of copied text >              MICROBIOLOGY:     RADIOLOGY:  [ ] Reviewed and interpreted by me    PULMONARY FUNCTION TESTS:    EKG:

## 2019-02-03 NOTE — PROGRESS NOTE ADULT - SUBJECTIVE AND OBJECTIVE BOX
Patient is a 68y old  Female who presents with a chief complaint of fever, increased sputum production, dysuria (02 Feb 2019 10:57)      SUBJECTIVE / OVERNIGHT EVENTS:    patient seen and examined at bedside. feels well. coughing but no chest pain fevers or chills    ROS:  14 point ROS negative in detail except stated as above    MEDICATIONS  (STANDING):  alteplase for catheter clearance 2 milliGRAM(s) Catheter once  apixaban 5 milliGRAM(s) Oral every 12 hours  buDESOnide 160 MICROgram(s)/formoterol 4.5 MICROgram(s) Inhaler 2 Puff(s) Inhalation two times a day  cefepime   IVPB 2000 milliGRAM(s) IV Intermittent every 8 hours  dextrose 5%. 1000 milliLiter(s) (50 mL/Hr) IV Continuous <Continuous>  dextrose 50% Injectable 12.5 Gram(s) IV Push once  dextrose 50% Injectable 25 Gram(s) IV Push once  dextrose 50% Injectable 25 Gram(s) IV Push once  digoxin     Tablet 0.25 milliGRAM(s) Oral daily  docusate sodium 100 milliGRAM(s) Oral three times a day  fluticasone propionate 50 MICROgram(s)/spray Nasal Spray 1 Spray(s) Both Nostrils two times a day  influenza   Vaccine 0.5 milliLiter(s) IntraMuscular once  insulin glargine Injectable (LANTUS) 20 Unit(s) SubCutaneous at bedtime  insulin lispro (HumaLOG) corrective regimen sliding scale   SubCutaneous three times a day before meals  insulin lispro (HumaLOG) corrective regimen sliding scale   SubCutaneous at bedtime  insulin lispro Injectable (HumaLOG) 8 Unit(s) SubCutaneous three times a day before meals  ipratropium 42 MICROgram(s) Nasal 1 Spray(s) Nasal three times a day  methylPREDNISolone 32 milliGRAM(s) Oral daily  metroNIDAZOLE    Tablet 500 milliGRAM(s) Oral two times a day  montelukast 10 milliGRAM(s) Oral daily  pantoprazole    Tablet 40 milliGRAM(s) Oral before breakfast  polyethylene glycol 3350 17 Gram(s) Oral every 12 hours  senna 2 Tablet(s) Oral at bedtime  tiotropium 18 MICROgram(s) Capsule 1 Capsule(s) Inhalation daily    MEDICATIONS  (PRN):  acetaminophen   Tablet .. 975 milliGRAM(s) Oral every 6 hours PRN Temp greater or equal to 38C (100.4F), Mild Pain (1 - 3), Moderate Pain (4 - 6)  ALBUTerol/ipratropium for Nebulization 3 milliLiter(s) Nebulizer every 6 hours PRN Shortness of Breath and/or Wheezing  dextrose 40% Gel 15 Gram(s) Oral once PRN Blood Glucose LESS THAN 70 milliGRAM(s)/deciliter  glucagon  Injectable 1 milliGRAM(s) IntraMuscular once PRN Glucose LESS THAN 70 milligrams/deciliter  guaiFENesin  milliGRAM(s) Oral every 12 hours PRN Cough  metoclopramide 5 milliGRAM(s) Oral three times a day PRN Nausea  simethicone 80 milliGRAM(s) Chew four times a day PRN Gas  traMADol 25 milliGRAM(s) Oral every 4 hours PRN Severe Pain (7 - 10)      CAPILLARY BLOOD GLUCOSE      POCT Blood Glucose.: 182 mg/dL (03 Feb 2019 07:49)  POCT Blood Glucose.: 172 mg/dL (02 Feb 2019 22:05)  POCT Blood Glucose.: 158 mg/dL (02 Feb 2019 18:21)  POCT Blood Glucose.: 335 mg/dL (02 Feb 2019 12:18)    I&O's Summary    02 Feb 2019 07:01  -  03 Feb 2019 07:00  --------------------------------------------------------  IN: 240 mL / OUT: 250 mL / NET: -10 mL    03 Feb 2019 07:01  -  03 Feb 2019 11:43  --------------------------------------------------------  IN: 100 mL / OUT: 0 mL / NET: 100 mL        PHYSICAL EXAM:  Vital Signs Last 24 Hrs  T(C): 36.4 (03 Feb 2019 04:14), Max: 36.9 (02 Feb 2019 12:00)  T(F): 97.6 (03 Feb 2019 04:14), Max: 98.5 (02 Feb 2019 21:04)  HR: 59 (03 Feb 2019 04:14) (59 - 86)  BP: 114/57 (03 Feb 2019 04:14) (114/57 - 145/65)  BP(mean): --  RR: 18 (03 Feb 2019 04:14) (18 - 18)  SpO2: 97% (03 Feb 2019 04:14) (96% - 97%)      PHYSICAL EXAM:  GENERAL: NAD.  CARDIOVASCULAR: Normal S1, S2, rrr  CHEST: Diffuse end expiratory rhonchi  GI: Abdomen soft, Nontender. Bowel sounds present.  Dressing over rectal area c/d.i  MSK/Ext:  No edema, clubbing or cyanosis  PSYCH: Normal Affect. AAOx3  LABS:                        11.4   8.36  )-----------( 268      ( 01 Feb 2019 21:30 )             36.6             CARDIAC MARKERS ( 02 Feb 2019 13:19 )  x     / x     / 34 U/L / x     / 1.2 ng/mL            Care Discussed with Consultants/Other Providers:  Lis Pereira

## 2019-02-03 NOTE — PROGRESS NOTE ADULT - ATTENDING COMMENTS
pt seen and examined pt seen and examined    68 yr F with a PMH of severe asthma, bronchiectasis, tracheobronchomalacia s/p tracheo-bronchoplasty in 10/16 on chronic low dose Prednisone, Afib on Eliquis, DM2, HTN, Squamous cell CA of the anus on chemo/XRT, s/p abdominoperineal proctectomy for recurrent exophytic anal cancer in 7/18, recent admission to [facility] for acute hypoxic resp failure, coronavirus PNA, discharged with steroid taper who presents with fever x1 day. as per patient has "no idea where it is coming from". States SOB, wheeze and cough at baseline, not expectorating. +mild burning when urinating and pelvic fullness, discomfort. Status ostomy output is unchanged. Denies chest pain or abdominal pain otherwise. +Nausea with 1 episode of emesis the day of admission.    A/P:   admitted  with fever initially thoght to be due to ?2/2 rectal abscess,  on   Cefepime and Flagyl as per ID.   CT C/A/P noncon did not show obvious infection -    Respiratory symptoms and exam at baseline, CT chest unchanged, on RA - contd PO- Medrol 32 mg PO QD  and slow taper until back to chronic dose  c/w home pulm regimen: Symbicort, Duonebs, Spiriva, Singulair, Flonase  Out of bed to chair, Incentive spirometer,    DVT ppx pt seen and examined    68 yr F with a PMH of severe asthma, bronchiectasis, tracheobronchomalacia s/p tracheo-bronchoplasty in 10/16 on chronic low dose Prednisone, Afib on Eliquis, DM2, HTN, Squamous cell CA of the anus on chemo/XRT, s/p abdominoperineal proctectomy for recurrent exophytic anal cancer in 7/18, recent admission to [facility] for acute hypoxic resp failure, coronavirus PNA, discharged with steroid taper who presents with fever x1 day. as per patient has "no idea where it is coming from". States SOB, wheeze and cough at baseline, not expectorating. +mild burning when urinating and pelvic fullness, discomfort. Status ostomy output is unchanged. Denies chest pain or abdominal pain otherwise. +Nausea with 1 episode of emesis the day of admission.    A/P:   admitted  with fever initially thoUght to be due to ?2/2 rectal abscess,  on   Cefepime and Flagyl as per ID.   CT C/A/P noncon did not show obvious infection --  NEEDS SURGERY FOLLOW UP  ---ID OPINION ABOUT THE ANTIBIOTICS  DURATION    Respiratory symptoms and exam at baseline, CT chest unchanged, on RA - contd PO- Medrol 32 mg PO QD  and slow taper until back to chronic dose  c/w home pulm regimen: Symbicort, Duonebs, Spiriva, Singulair, Flonase  Out of bed to chair, Incentive spirometer,    DVT ppx

## 2019-02-03 NOTE — PROGRESS NOTE ADULT - ASSESSMENT
68M severe asthma/COPD, tracheomalacia s/p tracheo-bronchoplasty (2016), anal SCC s/p chemo/RT/proctectomy with colostomy, adrenal insufficiency on chronic medrol, CKD, afib on apixaban, T2DM, remote DVT, recent admission for coronavirus infection/COPD exacerbation/CAP presents with fever, increased sputum production, and dysuria x 1-2 days, admitted meeting SIRS criteria, suspicious for perianal abscess ? rectal fistula

## 2019-02-04 ENCOUNTER — APPOINTMENT (OUTPATIENT)
Dept: PULMONOLOGY | Facility: CLINIC | Age: 69
End: 2019-02-04

## 2019-02-04 LAB
GLUCOSE BLDC GLUCOMTR-MCNC: 175 MG/DL — HIGH (ref 70–99)
GLUCOSE BLDC GLUCOMTR-MCNC: 261 MG/DL — HIGH (ref 70–99)

## 2019-02-04 PROCEDURE — 99232 SBSQ HOSP IP/OBS MODERATE 35: CPT

## 2019-02-04 PROCEDURE — 99233 SBSQ HOSP IP/OBS HIGH 50: CPT

## 2019-02-04 RX ADMIN — APIXABAN 5 MILLIGRAM(S): 2.5 TABLET, FILM COATED ORAL at 17:11

## 2019-02-04 RX ADMIN — Medication 8 UNIT(S): at 08:15

## 2019-02-04 RX ADMIN — INSULIN GLARGINE 20 UNIT(S): 100 INJECTION, SOLUTION SUBCUTANEOUS at 21:50

## 2019-02-04 RX ADMIN — Medication 100 MILLIGRAM(S): at 21:50

## 2019-02-04 RX ADMIN — Medication 500 MILLIGRAM(S): at 17:11

## 2019-02-04 RX ADMIN — BUDESONIDE AND FORMOTEROL FUMARATE DIHYDRATE 2 PUFF(S): 160; 4.5 AEROSOL RESPIRATORY (INHALATION) at 17:12

## 2019-02-04 RX ADMIN — TIOTROPIUM BROMIDE 1 CAPSULE(S): 18 CAPSULE ORAL; RESPIRATORY (INHALATION) at 12:50

## 2019-02-04 RX ADMIN — BUDESONIDE AND FORMOTEROL FUMARATE DIHYDRATE 2 PUFF(S): 160; 4.5 AEROSOL RESPIRATORY (INHALATION) at 05:43

## 2019-02-04 RX ADMIN — Medication 8 UNIT(S): at 12:48

## 2019-02-04 RX ADMIN — Medication 0.25 MILLIGRAM(S): at 05:43

## 2019-02-04 RX ADMIN — CEFEPIME 100 MILLIGRAM(S): 1 INJECTION, POWDER, FOR SOLUTION INTRAMUSCULAR; INTRAVENOUS at 14:28

## 2019-02-04 RX ADMIN — SENNA PLUS 2 TABLET(S): 8.6 TABLET ORAL at 21:50

## 2019-02-04 RX ADMIN — Medication 8 UNIT(S): at 17:09

## 2019-02-04 RX ADMIN — MONTELUKAST 10 MILLIGRAM(S): 4 TABLET, CHEWABLE ORAL at 12:49

## 2019-02-04 RX ADMIN — Medication 100 MILLIGRAM(S): at 05:43

## 2019-02-04 RX ADMIN — CEFEPIME 100 MILLIGRAM(S): 1 INJECTION, POWDER, FOR SOLUTION INTRAMUSCULAR; INTRAVENOUS at 05:44

## 2019-02-04 RX ADMIN — Medication 32 MILLIGRAM(S): at 05:43

## 2019-02-04 RX ADMIN — Medication 1 SPRAY(S): at 14:29

## 2019-02-04 RX ADMIN — Medication 1 SPRAY(S): at 21:50

## 2019-02-04 RX ADMIN — Medication 4: at 17:08

## 2019-02-04 RX ADMIN — CEFEPIME 100 MILLIGRAM(S): 1 INJECTION, POWDER, FOR SOLUTION INTRAMUSCULAR; INTRAVENOUS at 21:50

## 2019-02-04 RX ADMIN — Medication 500 MILLIGRAM(S): at 05:43

## 2019-02-04 RX ADMIN — Medication 1 SPRAY(S): at 05:43

## 2019-02-04 RX ADMIN — POLYETHYLENE GLYCOL 3350 17 GRAM(S): 17 POWDER, FOR SOLUTION ORAL at 17:10

## 2019-02-04 RX ADMIN — POLYETHYLENE GLYCOL 3350 17 GRAM(S): 17 POWDER, FOR SOLUTION ORAL at 05:43

## 2019-02-04 RX ADMIN — PANTOPRAZOLE SODIUM 40 MILLIGRAM(S): 20 TABLET, DELAYED RELEASE ORAL at 05:44

## 2019-02-04 RX ADMIN — Medication 2: at 08:15

## 2019-02-04 RX ADMIN — APIXABAN 5 MILLIGRAM(S): 2.5 TABLET, FILM COATED ORAL at 05:43

## 2019-02-04 RX ADMIN — Medication 6: at 12:47

## 2019-02-04 NOTE — PROGRESS NOTE ADULT - SUBJECTIVE AND OBJECTIVE BOX
CHIEF COMPLAINT:  f/up for sob, TBM, bronchiectasis, asthma, recurrent PNA-corona virus-much better over all--using acapella    Interval Events:  off vest due to ocular issues; minimal ambulation    REVIEW OF SYSTEMS:  Constitutional: No fevers or chills. No weight loss. + fatigue or generalized malaise.  Eyes: No itching or discharge from the eyes  ENT: No ear pain. No ear discharge. No nasal congestion. No post nasal drip. No epistaxis. No throat pain. No sore throat. No difficulty swallowing.   CV: No chest pain. No palpitations. No lightheadedness or dizziness.   Resp: No dyspnea at rest. No dyspnea on exertion. No orthopnea. + wheezing. + cough. No stridor. + sputum production. No chest pain with respiration.  GI: No nausea. No vomiting. No diarrhea.  MSK: No joint pain or pain in any extremities  Integumentary: No skin lesions. No pedal edema.  Neurological: No gross motor weakness. No sensory changes.  [+ ] All other systems negative  [ ] Unable to assess ROS because ________    OBJECTIVE:  ICU Vital Signs Last 24 Hrs  T(C): 36.8 (04 Feb 2019 04:47), Max: 36.9 (03 Feb 2019 20:55)  T(F): 98.2 (04 Feb 2019 04:47), Max: 98.4 (03 Feb 2019 20:55)  HR: 69 (04 Feb 2019 04:47) (68 - 84)  BP: 110/61 (04 Feb 2019 04:47) (110/61 - 122/73)  BP(mean): --  ABP: --  ABP(mean): --  RR: 18 (04 Feb 2019 04:47) (18 - 18)  SpO2: 98% (04 Feb 2019 04:47) (98% - 99%)        02-02 @ 07:01  -  02-03 @ 07:00  --------------------------------------------------------  IN: 240 mL / OUT: 250 mL / NET: -10 mL    02-03 @ 07:01  -  02-04 @ 05:16  --------------------------------------------------------  IN: 610 mL / OUT: 550 mL / NET: 60 mL      CAPILLARY BLOOD GLUCOSE      POCT Blood Glucose.: 150 mg/dL (03 Feb 2019 21:39)      PHYSICAL EXAM: NAD on RA  General: Awake, alert, oriented X 3.   HEENT: Atraumatic, normocephalic.                 Mallampatti Grade 2                No nasal congestion.                No tonsillar or pharyngeal exudates.  Lymph Nodes: No palpable lymphadenopathy  Neck: No JVD. No carotid bruit.   Respiratory: Normal chest expansion                         Normal percussion                         Normal and equal air entry                         + mild exp wheeze, no rhonchi or rales.  Cardiovascular: S1 S2 normal. No murmurs, rubs or gallops.   Abdomen: Soft, non-tender, non-distended. No organomegaly. Normoactive bowel sounds-ostomy in place  Extremities: Warm to touch. Peripheral pulse palpable. No pedal edema.   Skin: No rashes or skin lesions  Neurological: Motor and sensory examination equal and normal in all four extremities.  Psychiatry: Appropriate mood and affect.    HOSPITAL MEDICATIONS:  MEDICATIONS  (STANDING):  apixaban 5 milliGRAM(s) Oral every 12 hours  buDESOnide 160 MICROgram(s)/formoterol 4.5 MICROgram(s) Inhaler 2 Puff(s) Inhalation two times a day  cefepime   IVPB 2000 milliGRAM(s) IV Intermittent every 8 hours  dextrose 5%. 1000 milliLiter(s) (50 mL/Hr) IV Continuous <Continuous>  dextrose 50% Injectable 12.5 Gram(s) IV Push once  dextrose 50% Injectable 25 Gram(s) IV Push once  dextrose 50% Injectable 25 Gram(s) IV Push once  digoxin     Tablet 0.25 milliGRAM(s) Oral daily  docusate sodium 100 milliGRAM(s) Oral three times a day  fluticasone propionate 50 MICROgram(s)/spray Nasal Spray 1 Spray(s) Both Nostrils two times a day  influenza   Vaccine 0.5 milliLiter(s) IntraMuscular once  insulin glargine Injectable (LANTUS) 20 Unit(s) SubCutaneous at bedtime  insulin lispro (HumaLOG) corrective regimen sliding scale   SubCutaneous three times a day before meals  insulin lispro (HumaLOG) corrective regimen sliding scale   SubCutaneous at bedtime  insulin lispro Injectable (HumaLOG) 8 Unit(s) SubCutaneous three times a day before meals  ipratropium 42 MICROgram(s) Nasal 1 Spray(s) Nasal three times a day  methylPREDNISolone 32 milliGRAM(s) Oral daily  metroNIDAZOLE    Tablet 500 milliGRAM(s) Oral two times a day  montelukast 10 milliGRAM(s) Oral daily  pantoprazole    Tablet 40 milliGRAM(s) Oral before breakfast  polyethylene glycol 3350 17 Gram(s) Oral every 12 hours  senna 2 Tablet(s) Oral at bedtime  tiotropium 18 MICROgram(s) Capsule 1 Capsule(s) Inhalation daily    MEDICATIONS  (PRN):  acetaminophen   Tablet .. 975 milliGRAM(s) Oral every 6 hours PRN Temp greater or equal to 38C (100.4F), Mild Pain (1 - 3), Moderate Pain (4 - 6)  ALBUTerol/ipratropium for Nebulization 3 milliLiter(s) Nebulizer every 6 hours PRN Shortness of Breath and/or Wheezing  dextrose 40% Gel 15 Gram(s) Oral once PRN Blood Glucose LESS THAN 70 milliGRAM(s)/deciliter  glucagon  Injectable 1 milliGRAM(s) IntraMuscular once PRN Glucose LESS THAN 70 milligrams/deciliter  guaiFENesin  milliGRAM(s) Oral every 12 hours PRN Cough  metoclopramide 5 milliGRAM(s) Oral three times a day PRN Nausea  simethicone 80 milliGRAM(s) Chew four times a day PRN Gas  traMADol 25 milliGRAM(s) Oral every 4 hours PRN Severe Pain (7 - 10)      LABS:                        11.5   10.05 )-----------( 367      ( 03 Feb 2019 17:20 )             37.5     02-03    139  |  101  |  17  ----------------------------<  243<H>  4.5   |  25  |  0.60    Ca    9.0      03 Feb 2019 15:24                MICROBIOLOGY:     RADIOLOGY:  [ ] Reviewed and interpreted by me    Point of Care Ultrasound Findings:    PFT:    EKG:

## 2019-02-04 NOTE — PROGRESS NOTE ADULT - ASSESSMENT
68 yr F with a PMH of severe asthma, bronchiectasis, tracheobronchomalacia s/p tracheo-bronchoplasty in 10/16 on chronic low dose Prednisone, Afib on Eliquis, DM2, HTN, Squamous cell CA of the anus on chemo/XRT, s/p abdominoperineal proctectomy for recurrent exophytic anal cancer in 7/18, recent admission to [facility] for acute hypoxic resp failure, coronavirus PNA, discharged with steroid taper who presents with fever x1 day. as per patient has "no idea where it is coming from". States SOB, wheeze and cough at baseline, not expectorating. +mild burning when urinating and pelvic fullness, discomfort. Status ostomy output is unchanged. Denies chest pain or abdominal pain otherwise. +Nausea with 1 episode of emesis this morning.     A/P: Fever for 1 day, 102 in ED. Mild leukocytosis, VBG WNL.  Otherwise nontoxic appearing.   Endorses suprapubic tenderness, dysuria, frequency - perianal abcess?  Respiratory symptoms and exam at baseline  Had received Vanc, Cefepime and flagyl as well as Solumedrol and NS bolus-- now on medrol  c/w steroids,  Symbicort, Duonebs, Spiriva, Singulair, Flonase  CT chest no PNA; no change from prior ct    See below:: 1/31- ID--DC vanco  2/4-perianal abcess-cefepime/flagyl in place, medrol 32mg

## 2019-02-04 NOTE — PROGRESS NOTE ADULT - SUBJECTIVE AND OBJECTIVE BOX
Patient is a 68y old  Female who presents with a chief complaint of fever, increased sputum production, dysuria (04 Feb 2019 11:10)      SUBJECTIVE / OVERNIGHT EVENTS:    patient seen and examined. feels well. some mild dry cough. no fevers, chills. dressing changed today. some mild clear drainage from area.    ROS:  14 point ROS negative in detail except stated as above    MEDICATIONS  (STANDING):  apixaban 5 milliGRAM(s) Oral every 12 hours  buDESOnide 160 MICROgram(s)/formoterol 4.5 MICROgram(s) Inhaler 2 Puff(s) Inhalation two times a day  cefepime   IVPB 2000 milliGRAM(s) IV Intermittent every 8 hours  dextrose 5%. 1000 milliLiter(s) (50 mL/Hr) IV Continuous <Continuous>  dextrose 50% Injectable 12.5 Gram(s) IV Push once  dextrose 50% Injectable 25 Gram(s) IV Push once  dextrose 50% Injectable 25 Gram(s) IV Push once  digoxin     Tablet 0.25 milliGRAM(s) Oral daily  docusate sodium 100 milliGRAM(s) Oral three times a day  fluticasone propionate 50 MICROgram(s)/spray Nasal Spray 1 Spray(s) Both Nostrils two times a day  influenza   Vaccine 0.5 milliLiter(s) IntraMuscular once  insulin glargine Injectable (LANTUS) 20 Unit(s) SubCutaneous at bedtime  insulin lispro (HumaLOG) corrective regimen sliding scale   SubCutaneous three times a day before meals  insulin lispro (HumaLOG) corrective regimen sliding scale   SubCutaneous at bedtime  insulin lispro Injectable (HumaLOG) 8 Unit(s) SubCutaneous three times a day before meals  ipratropium 42 MICROgram(s) Nasal 1 Spray(s) Nasal three times a day  methylPREDNISolone 32 milliGRAM(s) Oral daily  metroNIDAZOLE    Tablet 500 milliGRAM(s) Oral two times a day  montelukast 10 milliGRAM(s) Oral daily  pantoprazole    Tablet 40 milliGRAM(s) Oral before breakfast  polyethylene glycol 3350 17 Gram(s) Oral every 12 hours  senna 2 Tablet(s) Oral at bedtime  tiotropium 18 MICROgram(s) Capsule 1 Capsule(s) Inhalation daily    MEDICATIONS  (PRN):  acetaminophen   Tablet .. 975 milliGRAM(s) Oral every 6 hours PRN Temp greater or equal to 38C (100.4F), Mild Pain (1 - 3), Moderate Pain (4 - 6)  ALBUTerol/ipratropium for Nebulization 3 milliLiter(s) Nebulizer every 6 hours PRN Shortness of Breath and/or Wheezing  dextrose 40% Gel 15 Gram(s) Oral once PRN Blood Glucose LESS THAN 70 milliGRAM(s)/deciliter  glucagon  Injectable 1 milliGRAM(s) IntraMuscular once PRN Glucose LESS THAN 70 milligrams/deciliter  guaiFENesin  milliGRAM(s) Oral every 12 hours PRN Cough  metoclopramide 5 milliGRAM(s) Oral three times a day PRN Nausea  simethicone 80 milliGRAM(s) Chew four times a day PRN Gas  traMADol 25 milliGRAM(s) Oral every 4 hours PRN Severe Pain (7 - 10)      CAPILLARY BLOOD GLUCOSE      POCT Blood Glucose.: 175 mg/dL (04 Feb 2019 08:06)  POCT Blood Glucose.: 150 mg/dL (03 Feb 2019 21:39)  POCT Blood Glucose.: 152 mg/dL (03 Feb 2019 16:47)  POCT Blood Glucose.: 308 mg/dL (03 Feb 2019 12:09)    I&O's Summary    03 Feb 2019 07:01  -  04 Feb 2019 07:00  --------------------------------------------------------  IN: 730 mL / OUT: 1250 mL / NET: -520 mL    04 Feb 2019 07:01  -  04 Feb 2019 11:47  --------------------------------------------------------  IN: 0 mL / OUT: 300 mL / NET: -300 mL        PHYSICAL EXAM:  Vital Signs Last 24 Hrs  T(C): 36.8 (04 Feb 2019 04:47), Max: 36.9 (03 Feb 2019 20:55)  T(F): 98.2 (04 Feb 2019 04:47), Max: 98.4 (03 Feb 2019 20:55)  HR: 69 (04 Feb 2019 04:47) (68 - 84)  BP: 110/61 (04 Feb 2019 04:47) (110/61 - 122/73)  BP(mean): --  RR: 18 (04 Feb 2019 04:47) (18 - 18)  SpO2: 98% (04 Feb 2019 04:47) (98% - 99%)      PHYSICAL EXAM:  GENERAL: NAD  CARDIOVASCULAR: Normal S1, S2, rrr  CHEST: Diffuse end expiratory rhonchi  GI: Abdomen soft, Nontender. Bowel sounds present.  Dressing over rectal area c/d.i  MSK/Ext:  No edema, clubbing or cyanosis  PSYCH: Normal Affect. AAOx3    LABS:                        11.5   10.05 )-----------( 367      ( 03 Feb 2019 17:20 )             37.5     02-03    139  |  101  |  17  ----------------------------<  243<H>  4.5   |  25  |  0.60    Ca    9.0      03 Feb 2019 15:24        CARDIAC MARKERS ( 02 Feb 2019 13:19 )  x     / x     / 34 U/L / x     / 1.2 ng/mL      < from: MR Pelvis w/ IV Cont (02.02.19 @ 18:25) >  PROCEDURE:   MRI of the pelvis was performed with and without intravenous contrast.  6 cc of Gadavist were administered. 1.5 cc was discarded.    FINDINGS:    Pubic symphysis and sacroiliac joint fixation hardware results in   artifact across the pelvis on MRI imaging, limiting complete evaluation.    REPRODUCTIVE ORGANS: The uterus is not well-visualized; history of   partial hysterectomy as per note.  BOWEL: Status post distal proctocolectomy with left lower quadrant   colostomy.  BLADDER: Within normal limits.  LYMPH NODES: No pelvic lymphadenopathy.    VISUALIZED PORTIONS:    ABDOMINAL ORGANS: The visualized bowel loops are normal in caliber. Large   amount of stool in the cecum and visualized portion of the ascending   colon.  PERITONEUM: No ascites.  VESSELS: Within normal limits.  ABDOMINAL WALL: There is a peripherally enhancing collection containing a   fluid-fluid level and measuring 3 x 2.2 x 1.8 cm (AP x TV x CC) (17:10;   10:24) which is posterior to the anus and slightly to the left of midline.  BONES: Pubic symphysis and right sacroiliac joint fixation hardware is   better visualized on recent CT.    IMPRESSION:   Peripherally enhancing collection in the perianal region, slightly to the   left of midline may correlate with the reported draining perineal sinus.   Peripheral enhancement is a nonspecific finding and may be secondary to   infection or secondary to chronicity.    < end of copied text >        Consultant(s) Notes Reviewed:    id, pulm  Care Discussed with Consultants/Other Providers:  Dr. Joelle Hairston

## 2019-02-04 NOTE — PROGRESS NOTE ADULT - SUBJECTIVE AND OBJECTIVE BOX
INFECTIOUS DISEASES FOLLOW UP--Sergio Lai MD  Pager [phone]    This is a follow up note for this  68y Female with  resolved fevers, no active pulm symptoms and possible timi-anal infection.  on broad abx.    Further ROS:  CONSTITUTIONAL:  No fever, good appetite  CARDIOVASCULAR:  No chest pain or palpitations  RESPIRATORY:  No dyspnea  GASTROINTESTINAL:  No nausea, vomiting, diarrhea, or abdominal pain  GENITOURINARY:  No dysuria  NEUROLOGIC:  No headache,     Allergies  ampicillin (Short breath)  aspirin (Short breath)  Avelox (Short breath; Pruritus)  codeine (Short breath)  Dilaudid (Short breath)  iodine (Short breath; Swelling)  penicillin (Short breath)  shellfish (Anaphylaxis)  tetanus toxoid (Short breath)  Valium (Short breath)    ANTIBIOTICS/RELEVANT:  antimicrobials  cefepime   IVPB 2000 milliGRAM(s) IV Intermittent every 8 hours  metroNIDAZOLE    Tablet 500 milliGRAM(s) Oral two times a day    immunologic:  influenza   Vaccine 0.5 milliLiter(s) IntraMuscular once    OTHER:  acetaminophen   Tablet .. 975 milliGRAM(s) Oral every 6 hours PRN  ALBUTerol/ipratropium for Nebulization 3 milliLiter(s) Nebulizer every 6 hours PRN  apixaban 5 milliGRAM(s) Oral every 12 hours  buDESOnide 160 MICROgram(s)/formoterol 4.5 MICROgram(s) Inhaler 2 Puff(s) Inhalation two times a day  dextrose 40% Gel 15 Gram(s) Oral once PRN  dextrose 5%. 1000 milliLiter(s) IV Continuous <Continuous>  dextrose 50% Injectable 12.5 Gram(s) IV Push once  dextrose 50% Injectable 25 Gram(s) IV Push once  dextrose 50% Injectable 25 Gram(s) IV Push once  digoxin     Tablet 0.25 milliGRAM(s) Oral daily  docusate sodium 100 milliGRAM(s) Oral three times a day  fluticasone propionate 50 MICROgram(s)/spray Nasal Spray 1 Spray(s) Both Nostrils two times a day  glucagon  Injectable 1 milliGRAM(s) IntraMuscular once PRN  guaiFENesin  milliGRAM(s) Oral every 12 hours PRN  insulin glargine Injectable (LANTUS) 20 Unit(s) SubCutaneous at bedtime  insulin lispro (HumaLOG) corrective regimen sliding scale   SubCutaneous three times a day before meals  insulin lispro (HumaLOG) corrective regimen sliding scale   SubCutaneous at bedtime  insulin lispro Injectable (HumaLOG) 8 Unit(s) SubCutaneous three times a day before meals  ipratropium 42 MICROgram(s) Nasal 1 Spray(s) Nasal three times a day  methylPREDNISolone 32 milliGRAM(s) Oral daily  metoclopramide 5 milliGRAM(s) Oral three times a day PRN  montelukast 10 milliGRAM(s) Oral daily  pantoprazole    Tablet 40 milliGRAM(s) Oral before breakfast  polyethylene glycol 3350 17 Gram(s) Oral every 12 hours  senna 2 Tablet(s) Oral at bedtime  simethicone 80 milliGRAM(s) Chew four times a day PRN  tiotropium 18 MICROgram(s) Capsule 1 Capsule(s) Inhalation daily  traMADol 25 milliGRAM(s) Oral every 4 hours PRN    Objective:  Vital Signs Last 24 Hrs  T(C): 36.8 (04 Feb 2019 04:47), Max: 36.9 (03 Feb 2019 20:55)  T(F): 98.2 (04 Feb 2019 04:47), Max: 98.4 (03 Feb 2019 20:55)  HR: 69 (04 Feb 2019 04:47) (68 - 84)  BP: 110/61 (04 Feb 2019 04:47) (110/61 - 122/73)  BP(mean): --  RR: 18 (04 Feb 2019 04:47) (18 - 18)  SpO2: 98% (04 Feb 2019 04:47) (98% - 99%)    PHYSICAL EXAM:  Constitutional:no acute distress  Eyes:EBER, EOMI  Ear/Nose/Throat: no oral lesions, 	  Respiratory: few wheezes  Cardiovascular: S1S2  Gastrointestinal:soft, (+) BS, no tenderness  Extremities:no e/e/c  No Lymphadenopathy  IV sites not inflammed.    LABS:                        11.5   10.05 )-----------( 367      ( 03 Feb 2019 17:20 )             37.5     02-03    139  |  101  |  17  ----------------------------<  243<H>  4.5   |  25  |  0.60    Ca    9.0      03 Feb 2019 15:24    MICROBIOLOGY:  no new data    RADIOLOGY & ADDITIONAL STUDIES:    < from: MR Pelvis w/ IV Cont (02.02.19 @ 18:25) >    IMPRESSION:   Peripherally enhancing collection in the perianal region, slightly to the   left of midline may correlate with the reported draining perineal sinus.   Peripheral enhancement is a nonspecific finding and may be secondary to   infection or secondary to chronicity.    < end of copied text >

## 2019-02-04 NOTE — CHART NOTE - NSCHARTNOTEFT_GEN_A_CORE
Patient seen and examined.   Patient clinically doing well without fevers. Reports that drainage occurs periodically.   MRI seen and reviewed.     < from: MR Pelvis w/ IV Cont (02.02.19 @ 18:25) >    IMPRESSION:   Peripherally enhancing collection in the perianal region, slightly to the   left of midline may correlate with the reported draining perineal sinus.   Peripheral enhancement is a nonspecific finding and may be secondary to   infection or secondary to chronicity.      PHYSICAL EXAM:   General: NAD.  GI: Perineal region examined. Small perineal sinus tract continues to drain serous fluid. Minimal 1/4 inch plain packing placed within tract.       RECOMMENDATIONS:   - No need for antibiotics from surgery perspective.   - Would continue daily gentle packing of sinus tract in order to promote drainage.   - Please have patient follow up with Dr. Luong within one week of discharge (included in Discharge Note).     Discussed with Colorectal Surgery Fellow, Dr. Dowell.     Red Team Surgery Pager #[phone]

## 2019-02-04 NOTE — PROGRESS NOTE ADULT - ATTENDING COMMENTS
as above--slightly better  multifactorial sob--AF/AV, asthma exacerbation, bronchiectasis, TBM, CB in face of corona virus  ID--perifanal abcess/infection-on cefepime/flagyl-?duration  Asthma-on medrol 32mg per day (tomorrow reduce to 24mg), symbicort, spiriva, singulair-- acapella to continue  TBM-off vest rx but to continue acapella  DVT-on rx  cards-as per Tosin Allison MD-Pulmonary   [phone]

## 2019-02-04 NOTE — PROGRESS NOTE ADULT - PROBLEM SELECTOR PLAN 3
medrol at 32mg per day--tomorrow if better consider 24mg  symbicort spiriva, singulair, out pt xolair, albuterol via HHN q 6

## 2019-02-04 NOTE — PROGRESS NOTE ADULT - ASSESSMENT
Imp/Rx:  The patient has had resolution of fevers and leukocytosis.  Unconvinced that this was of pulm origin.  Based on events and course and symptoms, it seems more likely that this was of timi-rectal origin.    Awaiting colorectal follow up.    I'd like to stop abx shortly.

## 2019-02-05 ENCOUNTER — APPOINTMENT (OUTPATIENT)
Dept: PULMONOLOGY | Facility: CLINIC | Age: 69
End: 2019-02-05

## 2019-02-05 LAB
ANION GAP SERPL CALC-SCNC: 9 MMOL/L — SIGNIFICANT CHANGE UP (ref 5–17)
BUN SERPL-MCNC: 14 MG/DL — SIGNIFICANT CHANGE UP (ref 7–23)
CALCIUM SERPL-MCNC: 8.8 MG/DL — SIGNIFICANT CHANGE UP (ref 8.4–10.5)
CHLORIDE SERPL-SCNC: 103 MMOL/L — SIGNIFICANT CHANGE UP (ref 96–108)
CO2 SERPL-SCNC: 30 MMOL/L — SIGNIFICANT CHANGE UP (ref 22–31)
CREAT SERPL-MCNC: 0.64 MG/DL — SIGNIFICANT CHANGE UP (ref 0.5–1.3)
GLUCOSE SERPL-MCNC: 104 MG/DL — HIGH (ref 70–99)
HCT VFR BLD CALC: 35.1 % — SIGNIFICANT CHANGE UP (ref 34.5–45)
HGB BLD-MCNC: 10.8 G/DL — LOW (ref 11.5–15.5)
MCHC RBC-ENTMCNC: 22 PG — LOW (ref 27–34)
MCHC RBC-ENTMCNC: 30.8 GM/DL — LOW (ref 32–36)
MCV RBC AUTO: 71.6 FL — LOW (ref 80–100)
PLATELET # BLD AUTO: 353 K/UL — SIGNIFICANT CHANGE UP (ref 150–400)
POTASSIUM SERPL-MCNC: 4.1 MMOL/L — SIGNIFICANT CHANGE UP (ref 3.5–5.3)
POTASSIUM SERPL-SCNC: 4.1 MMOL/L — SIGNIFICANT CHANGE UP (ref 3.5–5.3)
RBC # BLD: 4.9 M/UL — SIGNIFICANT CHANGE UP (ref 3.8–5.2)
RBC # FLD: 17.9 % — HIGH (ref 10.3–14.5)
SODIUM SERPL-SCNC: 142 MMOL/L — SIGNIFICANT CHANGE UP (ref 135–145)
WBC # BLD: 7.87 K/UL — SIGNIFICANT CHANGE UP (ref 3.8–10.5)
WBC # FLD AUTO: 7.87 K/UL — SIGNIFICANT CHANGE UP (ref 3.8–10.5)

## 2019-02-05 PROCEDURE — 99239 HOSP IP/OBS DSCHRG MGMT >30: CPT

## 2019-02-05 PROCEDURE — 93010 ELECTROCARDIOGRAM REPORT: CPT

## 2019-02-05 PROCEDURE — 99232 SBSQ HOSP IP/OBS MODERATE 35: CPT

## 2019-02-05 RX ADMIN — PANTOPRAZOLE SODIUM 40 MILLIGRAM(S): 20 TABLET, DELAYED RELEASE ORAL at 05:15

## 2019-02-05 RX ADMIN — Medication 100 MILLIGRAM(S): at 05:14

## 2019-02-05 RX ADMIN — MONTELUKAST 10 MILLIGRAM(S): 4 TABLET, CHEWABLE ORAL at 12:18

## 2019-02-05 RX ADMIN — BUDESONIDE AND FORMOTEROL FUMARATE DIHYDRATE 2 PUFF(S): 160; 4.5 AEROSOL RESPIRATORY (INHALATION) at 05:15

## 2019-02-05 RX ADMIN — INSULIN GLARGINE 20 UNIT(S): 100 INJECTION, SOLUTION SUBCUTANEOUS at 21:47

## 2019-02-05 RX ADMIN — Medication 8 UNIT(S): at 12:16

## 2019-02-05 RX ADMIN — TIOTROPIUM BROMIDE 1 CAPSULE(S): 18 CAPSULE ORAL; RESPIRATORY (INHALATION) at 12:18

## 2019-02-05 RX ADMIN — APIXABAN 5 MILLIGRAM(S): 2.5 TABLET, FILM COATED ORAL at 05:14

## 2019-02-05 RX ADMIN — CEFEPIME 100 MILLIGRAM(S): 1 INJECTION, POWDER, FOR SOLUTION INTRAMUSCULAR; INTRAVENOUS at 05:14

## 2019-02-05 RX ADMIN — Medication 4: at 12:16

## 2019-02-05 RX ADMIN — Medication 8 UNIT(S): at 08:26

## 2019-02-05 RX ADMIN — SENNA PLUS 2 TABLET(S): 8.6 TABLET ORAL at 21:47

## 2019-02-05 RX ADMIN — Medication 4: at 17:21

## 2019-02-05 RX ADMIN — Medication 100 MILLIGRAM(S): at 21:47

## 2019-02-05 RX ADMIN — Medication 500 MILLIGRAM(S): at 05:14

## 2019-02-05 RX ADMIN — Medication 1 SPRAY(S): at 21:47

## 2019-02-05 RX ADMIN — BUDESONIDE AND FORMOTEROL FUMARATE DIHYDRATE 2 PUFF(S): 160; 4.5 AEROSOL RESPIRATORY (INHALATION) at 17:24

## 2019-02-05 RX ADMIN — Medication 32 MILLIGRAM(S): at 05:14

## 2019-02-05 RX ADMIN — Medication 2: at 08:25

## 2019-02-05 RX ADMIN — Medication 1 SPRAY(S): at 05:15

## 2019-02-05 RX ADMIN — POLYETHYLENE GLYCOL 3350 17 GRAM(S): 17 POWDER, FOR SOLUTION ORAL at 05:14

## 2019-02-05 RX ADMIN — Medication 1 SPRAY(S): at 14:46

## 2019-02-05 RX ADMIN — Medication 8 UNIT(S): at 17:21

## 2019-02-05 RX ADMIN — APIXABAN 5 MILLIGRAM(S): 2.5 TABLET, FILM COATED ORAL at 17:23

## 2019-02-05 RX ADMIN — Medication 0.25 MILLIGRAM(S): at 05:14

## 2019-02-05 RX ADMIN — Medication 100 MILLIGRAM(S): at 14:47

## 2019-02-05 RX ADMIN — POLYETHYLENE GLYCOL 3350 17 GRAM(S): 17 POWDER, FOR SOLUTION ORAL at 17:24

## 2019-02-05 NOTE — PROGRESS NOTE ADULT - SUBJECTIVE AND OBJECTIVE BOX
Surgery Progress Note      Subjective: Patient seen and examined. perianal sinus packed with 1/4inch packing    T(C): 37 (02-04-19 @ 21:00), Max: 37 (02-04-19 @ 21:00)  HR: 72 (02-04-19 @ 21:00) (69 - 90)  BP: 149/56 (02-04-19 @ 21:00) (110/61 - 149/56)  RR: 18 (02-04-19 @ 21:00) (18 - 18)  SpO2: 99% (02-04-19 @ 21:00) (98% - 99%)      02-03-19 @ 07:01  -  02-04-19 @ 07:00  --------------------------------------------------------  IN: 730 mL / OUT: 1250 mL / NET: -520 mL    02-04-19 @ 07:01  -  02-05-19 @ 02:17  --------------------------------------------------------  IN: 950 mL / OUT: 601 mL / NET: 349 mL        Physical Exam:   General: NAD.  GI: Perineal region examined. Small perineal sinus tract continues to drain serous fluid. Minimal 1/4 inch plain packing placed within tract.     Labs:                          11.5   10.05 )-----------( 367      ( 03 Feb 2019 17:20 )             37.5     02-03    139  |  101  |  17  ----------------------------<  243<H>  4.5   |  25  |  0.60    Ca    9.0      03 Feb 2019 15:24        Medications:     acetaminophen   Tablet .. 975 milliGRAM(s) Oral every 6 hours PRN  ALBUTerol/ipratropium for Nebulization 3 milliLiter(s) Nebulizer every 6 hours PRN  apixaban 5 milliGRAM(s) Oral every 12 hours  buDESOnide 160 MICROgram(s)/formoterol 4.5 MICROgram(s) Inhaler 2 Puff(s) Inhalation two times a day  cefepime   IVPB 2000 milliGRAM(s) IV Intermittent every 8 hours  dextrose 40% Gel 15 Gram(s) Oral once PRN  dextrose 5%. 1000 milliLiter(s) IV Continuous <Continuous>  dextrose 50% Injectable 12.5 Gram(s) IV Push once  dextrose 50% Injectable 25 Gram(s) IV Push once  dextrose 50% Injectable 25 Gram(s) IV Push once  digoxin     Tablet 0.25 milliGRAM(s) Oral daily  docusate sodium 100 milliGRAM(s) Oral three times a day  fluticasone propionate 50 MICROgram(s)/spray Nasal Spray 1 Spray(s) Both Nostrils two times a day  glucagon  Injectable 1 milliGRAM(s) IntraMuscular once PRN  guaiFENesin  milliGRAM(s) Oral every 12 hours PRN  influenza   Vaccine 0.5 milliLiter(s) IntraMuscular once  insulin glargine Injectable (LANTUS) 20 Unit(s) SubCutaneous at bedtime  insulin lispro (HumaLOG) corrective regimen sliding scale   SubCutaneous three times a day before meals  insulin lispro (HumaLOG) corrective regimen sliding scale   SubCutaneous at bedtime  insulin lispro Injectable (HumaLOG) 8 Unit(s) SubCutaneous three times a day before meals  ipratropium 42 MICROgram(s) Nasal 1 Spray(s) Nasal three times a day  methylPREDNISolone 32 milliGRAM(s) Oral daily  metoclopramide 5 milliGRAM(s) Oral three times a day PRN  metroNIDAZOLE    Tablet 500 milliGRAM(s) Oral two times a day  montelukast 10 milliGRAM(s) Oral daily  pantoprazole    Tablet 40 milliGRAM(s) Oral before breakfast  polyethylene glycol 3350 17 Gram(s) Oral every 12 hours  senna 2 Tablet(s) Oral at bedtime  simethicone 80 milliGRAM(s) Chew four times a day PRN  tiotropium 18 MICROgram(s) Capsule 1 Capsule(s) Inhalation daily  traMADol 25 milliGRAM(s) Oral every 4 hours PRN      Radiographs:     < from: MR Pelvis w/ IV Cont (02.02.19 @ 18:25) >    IMPRESSION:   Peripherally enhancing collection in the perianal region, slightly to the   left of midline may correlate with the reported draining perineal sinus.   Peripheral enhancement is a nonspecific finding and may be secondary to   infection or secondary to chronicity. Surgery Progress Note      Subjective: Patient seen and examined. perianal sinus packed with 1/4inch packing    T(C): 37 (02-04-19 @ 21:00), Max: 37 (02-04-19 @ 21:00)  HR: 72 (02-04-19 @ 21:00) (69 - 90)  BP: 149/56 (02-04-19 @ 21:00) (110/61 - 149/56)  RR: 18 (02-04-19 @ 21:00) (18 - 18)  SpO2: 99% (02-04-19 @ 21:00) (98% - 99%)      02-03-19 @ 07:01  -  02-04-19 @ 07:00  --------------------------------------------------------  IN: 730 mL / OUT: 1250 mL / NET: -520 mL    02-04-19 @ 07:01  -  02-05-19 @ 02:17  --------------------------------------------------------  IN: 950 mL / OUT: 601 mL / NET: 349 mL        Physical Exam:   General: NAD.  GI: Perineal region examined. Packing replaced. Minimal serous output.     Labs:                          11.5   10.05 )-----------( 367      ( 03 Feb 2019 17:20 )             37.5     02-03    139  |  101  |  17  ----------------------------<  243<H>  4.5   |  25  |  0.60    Ca    9.0      03 Feb 2019 15:24        Medications:     acetaminophen   Tablet .. 975 milliGRAM(s) Oral every 6 hours PRN  ALBUTerol/ipratropium for Nebulization 3 milliLiter(s) Nebulizer every 6 hours PRN  apixaban 5 milliGRAM(s) Oral every 12 hours  buDESOnide 160 MICROgram(s)/formoterol 4.5 MICROgram(s) Inhaler 2 Puff(s) Inhalation two times a day  cefepime   IVPB 2000 milliGRAM(s) IV Intermittent every 8 hours  dextrose 40% Gel 15 Gram(s) Oral once PRN  dextrose 5%. 1000 milliLiter(s) IV Continuous <Continuous>  dextrose 50% Injectable 12.5 Gram(s) IV Push once  dextrose 50% Injectable 25 Gram(s) IV Push once  dextrose 50% Injectable 25 Gram(s) IV Push once  digoxin     Tablet 0.25 milliGRAM(s) Oral daily  docusate sodium 100 milliGRAM(s) Oral three times a day  fluticasone propionate 50 MICROgram(s)/spray Nasal Spray 1 Spray(s) Both Nostrils two times a day  glucagon  Injectable 1 milliGRAM(s) IntraMuscular once PRN  guaiFENesin  milliGRAM(s) Oral every 12 hours PRN  influenza   Vaccine 0.5 milliLiter(s) IntraMuscular once  insulin glargine Injectable (LANTUS) 20 Unit(s) SubCutaneous at bedtime  insulin lispro (HumaLOG) corrective regimen sliding scale   SubCutaneous three times a day before meals  insulin lispro (HumaLOG) corrective regimen sliding scale   SubCutaneous at bedtime  insulin lispro Injectable (HumaLOG) 8 Unit(s) SubCutaneous three times a day before meals  ipratropium 42 MICROgram(s) Nasal 1 Spray(s) Nasal three times a day  methylPREDNISolone 32 milliGRAM(s) Oral daily  metoclopramide 5 milliGRAM(s) Oral three times a day PRN  metroNIDAZOLE    Tablet 500 milliGRAM(s) Oral two times a day  montelukast 10 milliGRAM(s) Oral daily  pantoprazole    Tablet 40 milliGRAM(s) Oral before breakfast  polyethylene glycol 3350 17 Gram(s) Oral every 12 hours  senna 2 Tablet(s) Oral at bedtime  simethicone 80 milliGRAM(s) Chew four times a day PRN  tiotropium 18 MICROgram(s) Capsule 1 Capsule(s) Inhalation daily  traMADol 25 milliGRAM(s) Oral every 4 hours PRN      Radiographs:     < from: MR Pelvis w/ IV Cont (02.02.19 @ 18:25) >    IMPRESSION:   Peripherally enhancing collection in the perianal region, slightly to the   left of midline may correlate with the reported draining perineal sinus.   Peripheral enhancement is a nonspecific finding and may be secondary to   infection or secondary to chronicity.

## 2019-02-05 NOTE — PROGRESS NOTE ADULT - SUBJECTIVE AND OBJECTIVE BOX
INFECTIOUS DISEASES FOLLOW UP--Sergio Lai MD  Pager [phone]    This is a follow up note for this  68y Female with  fevers---resolved.  feels much better.  no rectal pain  less cough      Further ROS:  CONSTITUTIONAL:  No fever, good appetite  CARDIOVASCULAR:  No chest pain or palpitations  RESPIRATORY:  No dyspnea  GASTROINTESTINAL:  No nausea, vomiting, diarrhea, or abdominal pain  GENITOURINARY:  No dysuria  NEUROLOGIC:  No headache,     Allergies    ampicillin (Short breath)  aspirin (Short breath)  Avelox (Short breath; Pruritus)  codeine (Short breath)  Dilaudid (Short breath)  iodine (Short breath; Swelling)  penicillin (Short breath)  shellfish (Anaphylaxis)  tetanus toxoid (Short breath)  Valium (Short breath)    ANTIBIOTICS/RELEVANT:  antimicrobials  cefepime   IVPB 2000 milliGRAM(s) IV Intermittent every 8 hours  metroNIDAZOLE    Tablet 500 milliGRAM(s) Oral two times a day    immunologic:  influenza   Vaccine 0.5 milliLiter(s) IntraMuscular once    OTHER:  acetaminophen   Tablet .. 975 milliGRAM(s) Oral every 6 hours PRN  ALBUTerol/ipratropium for Nebulization 3 milliLiter(s) Nebulizer every 6 hours PRN  apixaban 5 milliGRAM(s) Oral every 12 hours  buDESOnide 160 MICROgram(s)/formoterol 4.5 MICROgram(s) Inhaler 2 Puff(s) Inhalation two times a day  dextrose 40% Gel 15 Gram(s) Oral once PRN  dextrose 5%. 1000 milliLiter(s) IV Continuous <Continuous>  dextrose 50% Injectable 12.5 Gram(s) IV Push once  dextrose 50% Injectable 25 Gram(s) IV Push once  dextrose 50% Injectable 25 Gram(s) IV Push once  digoxin     Tablet 0.25 milliGRAM(s) Oral daily  docusate sodium 100 milliGRAM(s) Oral three times a day  fluticasone propionate 50 MICROgram(s)/spray Nasal Spray 1 Spray(s) Both Nostrils two times a day  glucagon  Injectable 1 milliGRAM(s) IntraMuscular once PRN  guaiFENesin  milliGRAM(s) Oral every 12 hours PRN  insulin glargine Injectable (LANTUS) 20 Unit(s) SubCutaneous at bedtime  insulin lispro (HumaLOG) corrective regimen sliding scale   SubCutaneous three times a day before meals  insulin lispro (HumaLOG) corrective regimen sliding scale   SubCutaneous at bedtime  insulin lispro Injectable (HumaLOG) 8 Unit(s) SubCutaneous three times a day before meals  ipratropium 42 MICROgram(s) Nasal 1 Spray(s) Nasal three times a day  methylPREDNISolone 32 milliGRAM(s) Oral daily  metoclopramide 5 milliGRAM(s) Oral three times a day PRN  montelukast 10 milliGRAM(s) Oral daily  pantoprazole    Tablet 40 milliGRAM(s) Oral before breakfast  polyethylene glycol 3350 17 Gram(s) Oral every 12 hours  senna 2 Tablet(s) Oral at bedtime  simethicone 80 milliGRAM(s) Chew four times a day PRN  tiotropium 18 MICROgram(s) Capsule 1 Capsule(s) Inhalation daily  traMADol 25 milliGRAM(s) Oral every 4 hours PRN      Objective:  Vital Signs Last 24 Hrs  T(C): 36.7 (05 Feb 2019 04:39), Max: 37 (04 Feb 2019 21:00)  T(F): 98 (05 Feb 2019 04:39), Max: 98.6 (04 Feb 2019 21:00)  HR: 66 (05 Feb 2019 04:39) (66 - 90)  BP: 113/67 (05 Feb 2019 04:39) (113/67 - 149/56)  BP(mean): --  RR: 18 (05 Feb 2019 04:39) (18 - 18)  SpO2: 98% (05 Feb 2019 04:39) (98% - 99%)    PHYSICAL EXAM:  Constitutional:no acute distress  Eyes:EBER, EOMI  Ear/Nose/Throat: no oral lesions, 	  Respiratory: clear BL  Cardiovascular: S1S2  Gastrointestinal:soft, (+) BS, no tenderness  Extremities:no e/e/c  No Lymphadenopathy  IV sites not inflammed.    LABS:                        11.5   10.05 )-----------( 367      ( 03 Feb 2019 17:20 )             37.5     02-05    142  |  103  |  14  ----------------------------<  104<H>  4.1   |  30  |  0.64    Ca    8.8      05 Feb 2019 05:59      MICROBIOLOGY:  no new data    RADIOLOGY & ADDITIONAL STUDIES: no new data

## 2019-02-05 NOTE — PROGRESS NOTE ADULT - ASSESSMENT
Imp/Rx:  No new complaints.  No fevers.  Normalized WBC count.  surgical input noted.    OK to dc abx.  dc planning ok from id perspective.    thank you

## 2019-02-05 NOTE — CHART NOTE - NSCHARTNOTEFT_GEN_A_CORE
patient seen and examined. abx d/c. likely sepis was from perineal sinus. no intervention per surgery. f/u opt w Dr. Luong in 1 week. time spent coordinating d/c 45 min

## 2019-02-05 NOTE — CHART NOTE - NSCHARTNOTEFT_GEN_A_CORE
NP Karan Event Note Palpations     Patient sitting in chair playing [employer] reports palpations and mild SOB.     HPI  68M severe asthma/COPD, tracheomalacia s/p tracheo-bronchoplasty (2016), anal SCC s/p chemo/RT/proctectomy with colostomy, adrenal insufficiency on chronic medrol, CKD, afib on apixaban, T2DM, remote DVT, recent admission for coronavirus infection/COPD exacerbation/CAP presents with fever, increased sputum production, and dysuria x 1-2 days, admitted meeting SIRS criteria, suspicious for perianal abscess ? rectal fistula        ICU Vital Signs Last 24 Hrs  T(C): 37 (05 Feb 2019 19:02), Max: 37.1 (05 Feb 2019 13:58)  T(F): 98.6 (05 Feb 2019 19:02), Max: 98.7 (05 Feb 2019 13:58)  HR: 92 (05 Feb 2019 19:02) (66 - 96)  BP: 135/83 (05 Feb 2019 19:02) (107/69 - 149/56)  RR: 18 (05 Feb 2019 19:02) (18 - 18)  SpO2: 97% (05 Feb 2019 19:02) (95% - 99%)      Patient seen and examined  Patient has been discharged awaiting family  medically cleared   A+O x 3 No signs or symptoms of cardiac or respiratory distress noted. NP Karan Event Note Palpations     Patient sitting in chair playing [employer] reports palpations and mild SOB.     HPI  68M severe asthma/COPD, tracheomalacia s/p tracheo-bronchoplasty (2016), anal SCC s/p chemo/RT/proctectomy with colostomy, adrenal insufficiency on chronic medrol, CKD, afib on apixaban, T2DM, remote DVT, recent admission for coronavirus infection/COPD exacerbation/CAP presents with fever, increased sputum production, and dysuria x 1-2 days, admitted meeting SIRS criteria, suspicious for perianal abscess ? rectal fistula        ICU Vital Signs Last 24 Hrs  T(C): 37 (05 Feb 2019 19:02), Max: 37.1 (05 Feb 2019 13:58)  T(F): 98.6 (05 Feb 2019 19:02), Max: 98.7 (05 Feb 2019 13:58)  HR: 92 (05 Feb 2019 19:02) (66 - 96)  BP: 135/83 (05 Feb 2019 19:02) (107/69 - 149/56)  RR: 18 (05 Feb 2019 19:02) (18 - 18)  SpO2: 97% (05 Feb 2019 19:02) (95% - 99%)      Patient seen and examined  Patient has been discharged awaiting family  medically cleared   A+O x 3 No signs or symptoms of cardiac or respiratory distress noted.  Discussed with Dr. Elisa Aguilera for discharge     Discussed options with Patient and Daughter at bedside  Patient denies any current palpations or SOB   Offer Telemetry monitoring overnights  Patient refuses and requesting discharge home to proceed as planned  Verbalized will follow up with Dixon Shaw in AM

## 2019-02-05 NOTE — PROGRESS NOTE ADULT - SUBJECTIVE AND OBJECTIVE BOX
CHIEF COMPLAINT:  f/up for sob, TBM, bronchiectasis, asthma, recurrent PNA-corona virus- much better- less cough and mucus-breathing better    Interval Events: ID/CRS    REVIEW OF SYSTEMS:  Constitutional: No fevers or chills. No weight loss. + fatigue or generalized malaise.  Eyes: No itching or discharge from the eyes  ENT: No ear pain. No ear discharge. No nasal congestion. No post nasal drip. No epistaxis. No throat pain. No sore throat. No difficulty swallowing.   CV: No chest pain. No palpitations. No lightheadedness or dizziness.   Resp: No dyspnea at rest. No dyspnea on exertion. No orthopnea. No wheezing. + cough. No stridor. + sputum production. No chest pain with respiration.  GI: No nausea. No vomiting. No diarrhea.  MSK: No joint pain or pain in any extremities  Integumentary: No skin lesions. No pedal edema.  Neurological: No gross motor weakness. No sensory changes.  [+ ] All other systems negative  [ ] Unable to assess ROS because ________    OBJECTIVE:  ICU Vital Signs Last 24 Hrs  T(C): 36.7 (05 Feb 2019 04:39), Max: 37 (04 Feb 2019 21:00)  T(F): 98 (05 Feb 2019 04:39), Max: 98.6 (04 Feb 2019 21:00)  HR: 66 (05 Feb 2019 04:39) (66 - 90)  BP: 113/67 (05 Feb 2019 04:39) (113/67 - 149/56)  BP(mean): --  ABP: --  ABP(mean): --  RR: 18 (05 Feb 2019 04:39) (18 - 18)  SpO2: 98% (05 Feb 2019 04:39) (98% - 99%)        02-03 @ 07:01  -  02-04 @ 07:00  --------------------------------------------------------  IN: 730 mL / OUT: 1250 mL / NET: -520 mL    02-04 @ 07:01  -  02-05 @ 05:40  --------------------------------------------------------  IN: 950 mL / OUT: 601 mL / NET: 349 mL      CAPILLARY BLOOD GLUCOSE      POCT Blood Glucose.: 167 mg/dL (04 Feb 2019 21:34)      PHYSICAL EXAM: NAD in bed  General: Awake, alert, oriented X 3.   HEENT: Atraumatic, normocephalic.                 Mallampatti Grade 2                No nasal congestion.                No tonsillar or pharyngeal exudates.  Lymph Nodes: No palpable lymphadenopathy  Neck: No JVD. No carotid bruit.   Respiratory: Normal chest expansion                         Normal percussion                         Normal and equal air entry                         reduced exp wheeze and rhonchi but no rales.  Cardiovascular: S1 S2 normal. No murmurs, rubs or gallops.   Abdomen: Soft, non-tender, non-distended. No organomegaly. Normoactive bowel sounds. Ostomy in place  Extremities: Warm to touch. Peripheral pulse palpable. No pedal edema.   Skin: No rashes or skin lesions  Neurological: Motor and sensory examination equal and normal in all four extremities.  Psychiatry: Appropriate mood and affect.    HOSPITAL MEDICATIONS:  MEDICATIONS  (STANDING):  apixaban 5 milliGRAM(s) Oral every 12 hours  buDESOnide 160 MICROgram(s)/formoterol 4.5 MICROgram(s) Inhaler 2 Puff(s) Inhalation two times a day  cefepime   IVPB 2000 milliGRAM(s) IV Intermittent every 8 hours  dextrose 5%. 1000 milliLiter(s) (50 mL/Hr) IV Continuous <Continuous>  dextrose 50% Injectable 12.5 Gram(s) IV Push once  dextrose 50% Injectable 25 Gram(s) IV Push once  dextrose 50% Injectable 25 Gram(s) IV Push once  digoxin     Tablet 0.25 milliGRAM(s) Oral daily  docusate sodium 100 milliGRAM(s) Oral three times a day  fluticasone propionate 50 MICROgram(s)/spray Nasal Spray 1 Spray(s) Both Nostrils two times a day  influenza   Vaccine 0.5 milliLiter(s) IntraMuscular once  insulin glargine Injectable (LANTUS) 20 Unit(s) SubCutaneous at bedtime  insulin lispro (HumaLOG) corrective regimen sliding scale   SubCutaneous three times a day before meals  insulin lispro (HumaLOG) corrective regimen sliding scale   SubCutaneous at bedtime  insulin lispro Injectable (HumaLOG) 8 Unit(s) SubCutaneous three times a day before meals  ipratropium 42 MICROgram(s) Nasal 1 Spray(s) Nasal three times a day  methylPREDNISolone 32 milliGRAM(s) Oral daily  metroNIDAZOLE    Tablet 500 milliGRAM(s) Oral two times a day  montelukast 10 milliGRAM(s) Oral daily  pantoprazole    Tablet 40 milliGRAM(s) Oral before breakfast  polyethylene glycol 3350 17 Gram(s) Oral every 12 hours  senna 2 Tablet(s) Oral at bedtime  tiotropium 18 MICROgram(s) Capsule 1 Capsule(s) Inhalation daily    MEDICATIONS  (PRN):  acetaminophen   Tablet .. 975 milliGRAM(s) Oral every 6 hours PRN Temp greater or equal to 38C (100.4F), Mild Pain (1 - 3), Moderate Pain (4 - 6)  ALBUTerol/ipratropium for Nebulization 3 milliLiter(s) Nebulizer every 6 hours PRN Shortness of Breath and/or Wheezing  dextrose 40% Gel 15 Gram(s) Oral once PRN Blood Glucose LESS THAN 70 milliGRAM(s)/deciliter  glucagon  Injectable 1 milliGRAM(s) IntraMuscular once PRN Glucose LESS THAN 70 milligrams/deciliter  guaiFENesin  milliGRAM(s) Oral every 12 hours PRN Cough  metoclopramide 5 milliGRAM(s) Oral three times a day PRN Nausea  simethicone 80 milliGRAM(s) Chew four times a day PRN Gas  traMADol 25 milliGRAM(s) Oral every 4 hours PRN Severe Pain (7 - 10)      LABS:                        11.5   10.05 )-----------( 367      ( 03 Feb 2019 17:20 )             37.5     02-03    139  |  101  |  17  ----------------------------<  243<H>  4.5   |  25  |  0.60    Ca    9.0      03 Feb 2019 15:24                MICROBIOLOGY:     RADIOLOGY:  [ ] Reviewed and interpreted by me    Point of Care Ultrasound Findings:    PFT:    EKG:

## 2019-02-05 NOTE — PROGRESS NOTE ADULT - ASSESSMENT
68F with history of anal cancer s/p abdominoperineal proctectomy (7/24/2018) with a small, draining perineal sinus.    - No need for antibiotics from surgery perspective.   - Recommend continuing daily gentle packing of sinus tract in order to promote drainage.   - Please have patient follow up with Dr. Luong within one week of discharge (included in Discharge Note). 68F with history of anal cancer s/p abdominoperineal proctectomy (7/24/2018) with a small, draining perineal sinus.    - Please continue daily gentle packing of sinus tract in order to promote drainage.   - Please contact with any additional questions or concern.   - To follow up with Dr. Luong within one week as outpatient.     Red Team Surgery Pager #[phone]

## 2019-02-05 NOTE — PROVIDER CONTACT NOTE (OTHER) - ASSESSMENT
Alert and oriented X 4. Skin color good warm and dry to touch. Respirations regular and unlabored. Denies shortness  of breath or chest pain. Complaining of palpitations. /83-92-18-97% on room air-98.6. ALLEY Russo at bedside. Alert and oriented X 4. Skin color good warm and dry to touch. Respirations regular and unlabored. Denies chest pain. Complaining of palpitations. /83-92-18-97% on room air-98.6. ALLEY Russo at bedside.

## 2019-02-05 NOTE — PROGRESS NOTE ADULT - ASSESSMENT
68 yr F with a PMH of severe asthma, bronchiectasis, tracheobronchomalacia s/p tracheo-bronchoplasty in 10/16 on chronic low dose Prednisone, Afib on Eliquis, DM2, HTN, Squamous cell CA of the anus on chemo/XRT, s/p abdominoperineal proctectomy for recurrent exophytic anal cancer in 7/18, recent admission to [facility] for acute hypoxic resp failure, coronavirus PNA, discharged with steroid taper who presents with fever x1 day. as per patient has "no idea where it is coming from". States SOB, wheeze and cough at baseline, not expectorating. +mild burning when urinating and pelvic fullness, discomfort. Status ostomy output is unchanged. Denies chest pain or abdominal pain otherwise. +Nausea with 1 episode of emesis this morning.     A/P: Fever for 1 day, 102 in ED. Mild leukocytosis, VBG WNL.  Otherwise nontoxic appearing.   Endorses suprapubic tenderness, dysuria, frequency - perianal abcess?  Respiratory symptoms and exam at baseline  Had received Vanc, Cefepime and flagyl as well as Solumedrol and NS bolus-- now on medrol  c/w steroids,  Symbicort, Duonebs, Spiriva, Singulair, Flonase  CT chest no PNA; no change from prior ct    See below:: 1/31- ID--DC vanco  2/4-perianal abcess-cefepime/flagyl in place, medrol 32mg  2/5-CRS conservative/ID f/up-continues resp improvement

## 2019-02-05 NOTE — PROGRESS NOTE ADULT - PROBLEM SELECTOR PLAN 3
medrol at 32mg per day--today to 24mg  symbicort spiriva, singulair, out pt xolair, albuterol via HHN q 6

## 2019-02-05 NOTE — PROGRESS NOTE ADULT - ATTENDING COMMENTS
as above--improvement continues  multifactorial sob--AF/AV, asthma exacerbation, bronchiectasis, TBM, CB in face of corona virus  ID--perifanal abcess/infection-on cefepime/flagyl-?duration-CRS to be conservative in care  Asthma-on medrol 32mg per day can reduce to 24mg, symbicort, spiriva, singulair-- acapella to continue  TBM-off vest rx but to continue acapella  DVT-on rx                                                 DC planning now in ID hands.  cards-as per Tosin Allison MD-Pulmonary   [phone]

## 2019-02-06 VITALS
RESPIRATION RATE: 16 BRPM | OXYGEN SATURATION: 98 % | SYSTOLIC BLOOD PRESSURE: 104 MMHG | HEART RATE: 87 BPM | DIASTOLIC BLOOD PRESSURE: 65 MMHG | TEMPERATURE: 98 F

## 2019-02-06 DIAGNOSIS — R00.2 PALPITATIONS: ICD-10-CM

## 2019-02-06 LAB
ANION GAP SERPL CALC-SCNC: 12 MMOL/L — SIGNIFICANT CHANGE UP (ref 5–17)
BUN SERPL-MCNC: 13 MG/DL — SIGNIFICANT CHANGE UP (ref 7–23)
CALCIUM SERPL-MCNC: 8.6 MG/DL — SIGNIFICANT CHANGE UP (ref 8.4–10.5)
CHLORIDE SERPL-SCNC: 104 MMOL/L — SIGNIFICANT CHANGE UP (ref 96–108)
CO2 SERPL-SCNC: 25 MMOL/L — SIGNIFICANT CHANGE UP (ref 22–31)
CREAT SERPL-MCNC: 0.58 MG/DL — SIGNIFICANT CHANGE UP (ref 0.5–1.3)
GLUCOSE SERPL-MCNC: 188 MG/DL — HIGH (ref 70–99)
HCT VFR BLD CALC: 35 % — SIGNIFICANT CHANGE UP (ref 34.5–45)
HGB BLD-MCNC: 10.5 G/DL — LOW (ref 11.5–15.5)
MCHC RBC-ENTMCNC: 21 PG — LOW (ref 27–34)
MCHC RBC-ENTMCNC: 30 GM/DL — LOW (ref 32–36)
MCV RBC AUTO: 70.1 FL — LOW (ref 80–100)
PLATELET # BLD AUTO: 315 K/UL — SIGNIFICANT CHANGE UP (ref 150–400)
POTASSIUM SERPL-MCNC: 4.4 MMOL/L — SIGNIFICANT CHANGE UP (ref 3.5–5.3)
POTASSIUM SERPL-SCNC: 4.4 MMOL/L — SIGNIFICANT CHANGE UP (ref 3.5–5.3)
RBC # BLD: 4.99 M/UL — SIGNIFICANT CHANGE UP (ref 3.8–5.2)
RBC # FLD: 18.1 % — HIGH (ref 10.3–14.5)
SODIUM SERPL-SCNC: 141 MMOL/L — SIGNIFICANT CHANGE UP (ref 135–145)
WBC # BLD: 7.44 K/UL — SIGNIFICANT CHANGE UP (ref 3.8–10.5)
WBC # FLD AUTO: 7.44 K/UL — SIGNIFICANT CHANGE UP (ref 3.8–10.5)

## 2019-02-06 PROCEDURE — 82962 GLUCOSE BLOOD TEST: CPT

## 2019-02-06 PROCEDURE — 82803 BLOOD GASES ANY COMBINATION: CPT

## 2019-02-06 PROCEDURE — 84295 ASSAY OF SERUM SODIUM: CPT

## 2019-02-06 PROCEDURE — 81001 URINALYSIS AUTO W/SCOPE: CPT

## 2019-02-06 PROCEDURE — 84132 ASSAY OF SERUM POTASSIUM: CPT

## 2019-02-06 PROCEDURE — 87070 CULTURE OTHR SPECIMN AEROBIC: CPT

## 2019-02-06 PROCEDURE — 80048 BASIC METABOLIC PNL TOTAL CA: CPT

## 2019-02-06 PROCEDURE — A9585: CPT

## 2019-02-06 PROCEDURE — 84484 ASSAY OF TROPONIN QUANT: CPT

## 2019-02-06 PROCEDURE — 82550 ASSAY OF CK (CPK): CPT

## 2019-02-06 PROCEDURE — 94640 AIRWAY INHALATION TREATMENT: CPT

## 2019-02-06 PROCEDURE — 82947 ASSAY GLUCOSE BLOOD QUANT: CPT

## 2019-02-06 PROCEDURE — 99232 SBSQ HOSP IP/OBS MODERATE 35: CPT

## 2019-02-06 PROCEDURE — 80053 COMPREHEN METABOLIC PANEL: CPT

## 2019-02-06 PROCEDURE — 87633 RESP VIRUS 12-25 TARGETS: CPT

## 2019-02-06 PROCEDURE — 96375 TX/PRO/DX INJ NEW DRUG ADDON: CPT

## 2019-02-06 PROCEDURE — 87040 BLOOD CULTURE FOR BACTERIA: CPT

## 2019-02-06 PROCEDURE — 84100 ASSAY OF PHOSPHORUS: CPT

## 2019-02-06 PROCEDURE — 82435 ASSAY OF BLOOD CHLORIDE: CPT

## 2019-02-06 PROCEDURE — 87086 URINE CULTURE/COLONY COUNT: CPT

## 2019-02-06 PROCEDURE — 74176 CT ABD & PELVIS W/O CONTRAST: CPT

## 2019-02-06 PROCEDURE — 71045 X-RAY EXAM CHEST 1 VIEW: CPT

## 2019-02-06 PROCEDURE — 80162 ASSAY OF DIGOXIN TOTAL: CPT

## 2019-02-06 PROCEDURE — 87486 CHLMYD PNEUM DNA AMP PROBE: CPT

## 2019-02-06 PROCEDURE — 82553 CREATINE MB FRACTION: CPT

## 2019-02-06 PROCEDURE — 84145 PROCALCITONIN (PCT): CPT

## 2019-02-06 PROCEDURE — 87581 M.PNEUMON DNA AMP PROBE: CPT

## 2019-02-06 PROCEDURE — 99285 EMERGENCY DEPT VISIT HI MDM: CPT | Mod: 25

## 2019-02-06 PROCEDURE — 87798 DETECT AGENT NOS DNA AMP: CPT

## 2019-02-06 PROCEDURE — 96374 THER/PROPH/DIAG INJ IV PUSH: CPT

## 2019-02-06 PROCEDURE — 87449 NOS EACH ORGANISM AG IA: CPT

## 2019-02-06 PROCEDURE — 83735 ASSAY OF MAGNESIUM: CPT

## 2019-02-06 PROCEDURE — 85610 PROTHROMBIN TIME: CPT

## 2019-02-06 PROCEDURE — 82330 ASSAY OF CALCIUM: CPT

## 2019-02-06 PROCEDURE — 72196 MRI PELVIS W/DYE: CPT

## 2019-02-06 PROCEDURE — 82272 OCCULT BLD FECES 1-3 TESTS: CPT

## 2019-02-06 PROCEDURE — 99223 1ST HOSP IP/OBS HIGH 75: CPT | Mod: GC

## 2019-02-06 PROCEDURE — 86403 PARTICLE AGGLUT ANTBDY SCRN: CPT

## 2019-02-06 PROCEDURE — 87186 SC STD MICRODIL/AGAR DIL: CPT

## 2019-02-06 PROCEDURE — 85014 HEMATOCRIT: CPT

## 2019-02-06 PROCEDURE — 93005 ELECTROCARDIOGRAM TRACING: CPT

## 2019-02-06 PROCEDURE — 83605 ASSAY OF LACTIC ACID: CPT

## 2019-02-06 PROCEDURE — 71250 CT THORAX DX C-: CPT

## 2019-02-06 PROCEDURE — 85027 COMPLETE CBC AUTOMATED: CPT

## 2019-02-06 RX ORDER — PANTOPRAZOLE SODIUM 20 MG/1
1 TABLET, DELAYED RELEASE ORAL
Qty: 0 | Refills: 0 | COMMUNITY

## 2019-02-06 RX ORDER — MONTELUKAST 4 MG/1
1 TABLET, CHEWABLE ORAL
Qty: 0 | Refills: 0 | COMMUNITY

## 2019-02-06 RX ADMIN — POLYETHYLENE GLYCOL 3350 17 GRAM(S): 17 POWDER, FOR SOLUTION ORAL at 18:25

## 2019-02-06 RX ADMIN — Medication 6: at 18:25

## 2019-02-06 RX ADMIN — APIXABAN 5 MILLIGRAM(S): 2.5 TABLET, FILM COATED ORAL at 18:27

## 2019-02-06 RX ADMIN — POLYETHYLENE GLYCOL 3350 17 GRAM(S): 17 POWDER, FOR SOLUTION ORAL at 05:26

## 2019-02-06 RX ADMIN — Medication 8 UNIT(S): at 18:26

## 2019-02-06 RX ADMIN — Medication 4: at 12:28

## 2019-02-06 RX ADMIN — Medication 0.25 MILLIGRAM(S): at 05:26

## 2019-02-06 RX ADMIN — Medication 24 MILLIGRAM(S): at 05:26

## 2019-02-06 RX ADMIN — BUDESONIDE AND FORMOTEROL FUMARATE DIHYDRATE 2 PUFF(S): 160; 4.5 AEROSOL RESPIRATORY (INHALATION) at 05:25

## 2019-02-06 RX ADMIN — Medication 8 UNIT(S): at 08:30

## 2019-02-06 RX ADMIN — BUDESONIDE AND FORMOTEROL FUMARATE DIHYDRATE 2 PUFF(S): 160; 4.5 AEROSOL RESPIRATORY (INHALATION) at 18:27

## 2019-02-06 RX ADMIN — PANTOPRAZOLE SODIUM 40 MILLIGRAM(S): 20 TABLET, DELAYED RELEASE ORAL at 05:26

## 2019-02-06 RX ADMIN — Medication 100 MILLIGRAM(S): at 05:26

## 2019-02-06 RX ADMIN — MONTELUKAST 10 MILLIGRAM(S): 4 TABLET, CHEWABLE ORAL at 12:28

## 2019-02-06 RX ADMIN — APIXABAN 5 MILLIGRAM(S): 2.5 TABLET, FILM COATED ORAL at 05:27

## 2019-02-06 RX ADMIN — Medication 100 MILLIGRAM(S): at 12:31

## 2019-02-06 RX ADMIN — Medication 8 UNIT(S): at 12:28

## 2019-02-06 RX ADMIN — Medication 1 SPRAY(S): at 12:31

## 2019-02-06 RX ADMIN — Medication 1 SPRAY(S): at 05:25

## 2019-02-06 NOTE — CONSULT NOTE ADULT - SUBJECTIVE AND OBJECTIVE BOX
CHIEF COMPLAINT: cough, fevers    HISTORY OF PRESENT ILLNESS:  68F with AFib on apixaban, T2DM, severe asthma/COPD, anal SCC s/p chemo/XRT/protectomy, adrenal insufficiency on steroids, CKD who was admitted for COPD exacerbation and also treated for perianal abscess. The primary team was planning to discharge home on 2/5, but patient developed palpitations and was monitored on tele overnight.     The patient was laying in bed yesterday when she felt a "funny feeling" in her chest associated with chest pressure and the feeling of something sticking her chest. No associated dizziness, SOB, diaphoresis. The episode lasted a few minutes, resolved, then occurred one more time, again only lasting a few minutes. The patient has known pAFib and she thinks these episodes were AFib. No arrhythmia was captured on EKG and shew as not on tele at this time. She has no history of CAD. She is a non-smoker, but had second hand smoke exposure as a child. After the palpitations, she was moved to a tele bed and since arrival on the tele floor she has been in normal sinus rhythm. She has had no recurrence of the symptoms.     Cardiologist: Dr. Leahy    Allergies  ampicillin (Short breath)  aspirin (Short breath)  Avelox (Short breath; Pruritus)  codeine (Short breath)  Dilaudid (Short breath)  iodine (Short breath; Swelling)  penicillin (Short breath)  shellfish (Anaphylaxis)  tetanus toxoid (Short breath)  Valium (Short breath)    Intolerances    	    MEDICATIONS:  apixaban 5 milliGRAM(s) Oral every 12 hours  digoxin     Tablet 0.25 milliGRAM(s) Oral daily      ALBUTerol/ipratropium for Nebulization 3 milliLiter(s) Nebulizer every 6 hours PRN  buDESOnide 160 MICROgram(s)/formoterol 4.5 MICROgram(s) Inhaler 2 Puff(s) Inhalation two times a day  guaiFENesin  milliGRAM(s) Oral every 12 hours PRN  montelukast 10 milliGRAM(s) Oral daily  tiotropium 18 MICROgram(s) Capsule 1 Capsule(s) Inhalation daily    acetaminophen   Tablet .. 975 milliGRAM(s) Oral every 6 hours PRN    docusate sodium 100 milliGRAM(s) Oral three times a day  metoclopramide 5 milliGRAM(s) Oral three times a day PRN  pantoprazole    Tablet 40 milliGRAM(s) Oral before breakfast  polyethylene glycol 3350 17 Gram(s) Oral every 12 hours  senna 2 Tablet(s) Oral at bedtime  simethicone 80 milliGRAM(s) Chew four times a day PRN    dextrose 40% Gel 15 Gram(s) Oral once PRN  dextrose 50% Injectable 12.5 Gram(s) IV Push once  dextrose 50% Injectable 25 Gram(s) IV Push once  dextrose 50% Injectable 25 Gram(s) IV Push once  glucagon  Injectable 1 milliGRAM(s) IntraMuscular once PRN  insulin glargine Injectable (LANTUS) 20 Unit(s) SubCutaneous at bedtime  insulin lispro (HumaLOG) corrective regimen sliding scale   SubCutaneous three times a day before meals  insulin lispro (HumaLOG) corrective regimen sliding scale   SubCutaneous at bedtime  insulin lispro Injectable (HumaLOG) 8 Unit(s) SubCutaneous three times a day before meals  methylPREDNISolone 24 milliGRAM(s) Oral daily    dextrose 5%. 1000 milliLiter(s) IV Continuous <Continuous>  fluticasone propionate 50 MICROgram(s)/spray Nasal Spray 1 Spray(s) Both Nostrils two times a day  ipratropium 42 MICROgram(s) Nasal 1 Spray(s) Nasal three times a day      PAST MEDICAL & SURGICAL HISTORY:  TIA (transient ischemic attack): multiple, last 5 years ago - presents as right-sided weakness  DVT (deep venous thrombosis): 15-20 years ago, took coumadin  Seizure: x 1 1/7/18  Rectal bleeding  Colorectal cancer: 4/2018- last treatment , chemo and radiation  Tracheobronchomalacia: diagnosed 2015, s/p bronchial thermoplasty 2016 (Dr Zapien); recent bronchoscopy 6/5/2018 revealed no evidence of tracheobronchomalacia in trachea or bronchial tubes  Asthma  Pelvic fracture  Aortic insufficiency: moderate AR on echo 5/3/2018  Adrenal insufficiency: Medrol daily for over 50 years  COPD (chronic obstructive pulmonary disease)  Diabetes: Type 2  Atrial fibrillation: paroxysmal, on eliquis  Rectal bleeding: exam under anesthesia ([facility]) 2/2018  S/P bronchoscopy: 6/5/2018 - Shirley Cavazos (Dr Zapien) no evidence of tracheobronchomalacia in trachea or bronchial tubes  History of tracheomalacia: 2015 - attempted tracheal stenting ([facility])- course complicated by obstruction, respiratory failure, multiple CPR attempts -  stent discontinued; 10/20/2016 Tracheobronchoplasty (Prolene Mesh) performed at [facility] by Dr Zapien  H/O pelvic surgery: 5 years ago - s/p fracture  Exostosis of orbit, 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  Skin: no rashes.  Ophthalmologic: no blurred vision, no loss of vision. 	  ENT: no sore throat, rhinorrhea, sinus congestion.  Respiratory: no SOB, cough or wheeze.  Gastrointestinal:  no N/V/D, no melena/hematemesis/hematochezia.  Genitourinary: no dysuria/hesitancy or hematuria.  Musculoskeletal: no myalgias or arthralgias.  Neurological: no changes in vision or hearing, no lightheadedness/dizziness, no syncope/near syncope	  Psychiatric: no unusual stress/anxiety.   Hematology/Lymphatics: no unusual bleeding, bruising and no lymphadenopathy.  Endocrine: no unusual thirst.   All others negative except as stated above and in HPI.    PHYSICAL EXAM:  T(C): 36.9 (02-06-19 @ 13:26), Max: 37 (02-05-19 @ 19:02)  HR: 87 (02-06-19 @ 13:26) (69 - 92)  BP: 104/65 (02-06-19 @ 13:26) (104/65 - 145/73)  RR: 16 (02-06-19 @ 13:26) (16 - 20)  SpO2: 98% (02-06-19 @ 13:26) (96% - 99%)  Wt(kg): --  I&O's Summary    05 Feb 2019 07:01  -  06 Feb 2019 07:00  --------------------------------------------------------  IN: 600 mL / OUT: 1501 mL / NET: -901 mL    06 Feb 2019 07:01  -  06 Feb 2019 16:48  --------------------------------------------------------  IN: 600 mL / OUT: 400 mL / NET: 200 mL        Appearance: no acute distress  HEENT:   Normal oral mucosa, PERRL, EOMI	  Lymphatic: No lymphadenopathy  Cardiovascular: RRR, Normal S1 S2, No JVD, No murmurs, No edema  Respiratory: Lungs clear to auscultation	  Psychiatry: A & O x 3, Mood & affect appropriate  Gastrointestinal:  Soft, Non-tender, + BS	  Skin: No rashes, No ecchymoses, No cyanosis	  Neurologic: Non-focal  Extremities: Normal range of motion, No clubbing, cyanosis or edema  Vascular: Peripheral pulses palpable 2+ bilaterally      LABS:	 	    CBC Full  -  ( 06 Feb 2019 07:52 )  WBC Count : 7.44 K/uL  Hemoglobin : 10.5 g/dL  Hematocrit : 35.0 %  Platelet Count - Automated : 315 K/uL  Mean Cell Volume : 70.1 fl  Mean Cell Hemoglobin : 21.0 pg  Mean Cell Hemoglobin Concentration : 30.0 gm/dL  Auto Neutrophil # : x  Auto Lymphocyte # : x  Auto Monocyte # : x  Auto Eosinophil # : x  Auto Basophil # : x  Auto Neutrophil % : x  Auto Lymphocyte % : x  Auto Monocyte % : x  Auto Eosinophil % : x  Auto Basophil % : x    02-06    141  |  104  |  13  ----------------------------<  188<H>  4.4   |  25  |  0.58  02-05    142  |  103  |  14  ----------------------------<  104<H>  4.1   |  30  |  0.64    Ca    8.6      06 Feb 2019 06:10  Ca    8.8      05 Feb 2019 05:59        proBNP:   Lipid Profile:   HgA1c:   TSH:       CARDIAC MARKERS:            TELEMETRY: 	    EKG:  	  RADIOLOGY:    PREVIOUS DIAGNOSTIC TESTING:    Echocardiogram    Catheterization    Stress Test  	  ASSESSMENT/PLAN:  68F with AFib on apixaban, T2DM, severe asthma/COPD, anal SCC s/p chemo/XRT/protectomy, adrenal insufficiency on steroids, CKD who was admitted for COPD exacerbation and also treated for perianal abscess. The primary team was planning to discharge home on 2/5, but patient developed palpitations and was monitored on tele overnight. Cardiology consulted for evaluation prior to discharge at patient's request.     ***In progress***    #Palpitations  #Paroxysmal AFib  - NSR on tele overnight  - Continue home digoxin and apixaban 	    #Clearance for pulmonary rehab  - Patient hoping to start pulmonary rehab  - She has had no recent CP, no signs/symptoms of heart failure, no severe valvular abnormalities or arrhythmias  - It is safe for her to participate in pulmonary rehab from a cardiac standpoint    Stable for discharge home from a cardiac standpoint. She may f/u with Dr. Leahy in clinic in 4 weeks.       LUISITO Dewey  Cardiology Fellow   [id number] CHIEF COMPLAINT: cough, fevers    HISTORY OF PRESENT ILLNESS:  68F with AFib on apixaban, T2DM, severe asthma/COPD, anal SCC s/p chemo/XRT/protectomy, adrenal insufficiency on steroids, CKD who was admitted for COPD exacerbation and also treated for perianal abscess. The primary team was planning to discharge home on 2/5, but patient developed palpitations and was monitored on tele overnight.     The patient was laying in bed yesterday when she felt a "funny feeling" in her chest associated with chest pressure and the feeling of something sticking her chest. No associated dizziness, SOB, diaphoresis. The episode lasted a few minutes, resolved, then occurred one more time, again only lasting a few minutes. The patient has known pAFib and she thinks these episodes were AFib. No arrhythmia was captured on EKG and shew as not on tele at this time. She has no history of CAD. She is a non-smoker, but had second hand smoke exposure as a child. After the palpitations, she was moved to a tele bed and since arrival on the tele floor she has been in normal sinus rhythm. She has had no recurrence of the symptoms.     Cardiologist: Dr. Leahy    Allergies  ampicillin (Short breath)  aspirin (Short breath)  Avelox (Short breath; Pruritus)  codeine (Short breath)  Dilaudid (Short breath)  iodine (Short breath; Swelling)  penicillin (Short breath)  shellfish (Anaphylaxis)  tetanus toxoid (Short breath)  Valium (Short breath)    Intolerances    	    MEDICATIONS:  apixaban 5 milliGRAM(s) Oral every 12 hours  digoxin     Tablet 0.25 milliGRAM(s) Oral daily  ALBUTerol/ipratropium for Nebulization 3 milliLiter(s) Nebulizer every 6 hours PRN  buDESOnide 160 MICROgram(s)/formoterol 4.5 MICROgram(s) Inhaler 2 Puff(s) Inhalation two times a day  guaiFENesin  milliGRAM(s) Oral every 12 hours PRN  montelukast 10 milliGRAM(s) Oral daily  tiotropium 18 MICROgram(s) Capsule 1 Capsule(s) Inhalation daily  acetaminophen   Tablet .. 975 milliGRAM(s) Oral every 6 hours PRN  docusate sodium 100 milliGRAM(s) Oral three times a day  metoclopramide 5 milliGRAM(s) Oral three times a day PRN  pantoprazole    Tablet 40 milliGRAM(s) Oral before breakfast  polyethylene glycol 3350 17 Gram(s) Oral every 12 hours  senna 2 Tablet(s) Oral at bedtime  simethicone 80 milliGRAM(s) Chew four times a day PRN  dextrose 40% Gel 15 Gram(s) Oral once PRN  dextrose 50% Injectable 12.5 Gram(s) IV Push once  dextrose 50% Injectable 25 Gram(s) IV Push once  dextrose 50% Injectable 25 Gram(s) IV Push once  glucagon  Injectable 1 milliGRAM(s) IntraMuscular once PRN  insulin glargine Injectable (LANTUS) 20 Unit(s) SubCutaneous at bedtime  insulin lispro (HumaLOG) corrective regimen sliding scale   SubCutaneous three times a day before meals  insulin lispro (HumaLOG) corrective regimen sliding scale   SubCutaneous at bedtime  insulin lispro Injectable (HumaLOG) 8 Unit(s) SubCutaneous three times a day before meals  methylPREDNISolone 24 milliGRAM(s) Oral daily  dextrose 5%. 1000 milliLiter(s) IV Continuous <Continuous>  fluticasone propionate 50 MICROgram(s)/spray Nasal Spray 1 Spray(s) Both Nostrils two times a day  ipratropium 42 MICROgram(s) Nasal 1 Spray(s) Nasal three times a day      PAST MEDICAL & SURGICAL HISTORY:  TIA (transient ischemic attack): multiple, last 5 years ago - presents as right-sided weakness  DVT (deep venous thrombosis): 15-20 years ago, took coumadin  Seizure: x 1 1/7/18  Rectal bleeding  Colorectal cancer: 4/2018- last treatment , chemo and radiation  Tracheobronchomalacia: diagnosed 2015, s/p bronchial thermoplasty 2016 (Dr Zapien); recent bronchoscopy 6/5/2018 revealed no evidence of tracheobronchomalacia in trachea or bronchial tubes  Asthma  Pelvic fracture  Aortic insufficiency: moderate AR on echo 5/3/2018  Adrenal insufficiency: Medrol daily for over 50 years  COPD (chronic obstructive pulmonary disease)  Diabetes: Type 2  Atrial fibrillation: paroxysmal, on eliquis  Rectal bleeding: exam under anesthesia ([facility]) 2/2018  S/P bronchoscopy: 6/5/2018 - Shirley Cavazos (Dr Zapien) no evidence of tracheobronchomalacia in trachea or bronchial tubes  History of tracheomalacia: 2015 - attempted tracheal stenting ([facility])- course complicated by obstruction, respiratory failure, multiple CPR attempts -  stent discontinued; 10/20/2016 Tracheobronchoplasty (Prolene Mesh) performed at [facility] by Dr Zapien  H/O pelvic surgery: 5 years ago - s/p fracture  Exostosis of orbit, 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  Skin: no rashes.  Ophthalmologic: no blurred vision, no loss of vision. 	  ENT: no sore throat, rhinorrhea, sinus congestion.  Respiratory: no SOB, cough or wheeze.  Gastrointestinal:  no N/V/D, no melena/hematemesis/hematochezia.  Genitourinary: no dysuria/hesitancy or hematuria.  Musculoskeletal: no myalgias or arthralgias.  Neurological: no changes in vision or hearing, no lightheadedness/dizziness, no syncope/near syncope	  Psychiatric: no unusual stress/anxiety.   Hematology/Lymphatics: no unusual bleeding, bruising and no lymphadenopathy.  Endocrine: no unusual thirst.   All others negative except as stated above and in HPI.    PHYSICAL EXAM:  T(C): 36.9 (02-06-19 @ 13:26), Max: 37 (02-05-19 @ 19:02)  HR: 87 (02-06-19 @ 13:26) (69 - 92)  BP: 104/65 (02-06-19 @ 13:26) (104/65 - 145/73)  RR: 16 (02-06-19 @ 13:26) (16 - 20)  SpO2: 98% (02-06-19 @ 13:26) (96% - 99%)  Wt(kg): --  I&O's Summary    05 Feb 2019 07:01  -  06 Feb 2019 07:00  --------------------------------------------------------  IN: 600 mL / OUT: 1501 mL / NET: -901 mL    06 Feb 2019 07:01  -  06 Feb 2019 16:48  --------------------------------------------------------  IN: 600 mL / OUT: 400 mL / NET: 200 mL        Appearance: no acute distress  HEENT:   Normal oral mucosa, PERRL, EOMI	  Lymphatic: No lymphadenopathy  Cardiovascular: RRR, Normal S1 S2, No JVD, No murmurs, No edema  Respiratory: end expiratory wheeze, no crackles  Psychiatry: A & O x 3, Mood & affect appropriate  Gastrointestinal:  Soft, Non-tender, + BS	  Skin: No rashes, No ecchymoses, No cyanosis	  Neurologic: Non-focal  Extremities: Normal range of motion, No clubbing, cyanosis or edema  Vascular: Peripheral pulses palpable 2+ bilaterally      LABS:	 	    CBC Full  -  ( 06 Feb 2019 07:52 )  WBC Count : 7.44 K/uL  Hemoglobin : 10.5 g/dL  Hematocrit : 35.0 %  Platelet Count - Automated : 315 K/uL  Mean Cell Volume : 70.1 fl  Mean Cell Hemoglobin : 21.0 pg  Mean Cell Hemoglobin Concentration : 30.0 gm/dL  Auto Neutrophil # : x  Auto Lymphocyte # : x  Auto Monocyte # : x  Auto Eosinophil # : x  Auto Basophil # : x  Auto Neutrophil % : x  Auto Lymphocyte % : x  Auto Monocyte % : x  Auto Eosinophil % : x  Auto Basophil % : x    02-06    141  |  104  |  13  ----------------------------<  188<H>  4.4   |  25  |  0.58  02-05    142  |  103  |  14  ----------------------------<  104<H>  4.1   |  30  |  0.64    Ca    8.6      06 Feb 2019 06:10  Ca    8.8      05 Feb 2019 05:59        proBNP:   Lipid Profile:   HgA1c:   TSH:       CARDIAC MARKERS:            TELEMETRY: NSR, no ectopy or AFib    EKG:  NSR, Q waves in V1 and V2, unchanged compared to prior      PREVIOUS DIAGNOSTIC TESTING:    Echocardiogram  < from: Echocardiogram (05.03.18 @ 10:55) >  The left atrial size is normal. Right atrial size is normal.There is mild   aortic valve thickening. The aortic valve is trileaflet. There is   moderate   aortic regurgitation. No hemodynamically significantvalvular aortic   stenosis.    There is mild mitral valve thickening. There is mild mitral annular   calcification. There is trace mitral regurgitation.  Structurally normal   tricuspid valve. There is trace tricuspid regurgitation.There was   insufficient   TR detected from which to calculate pulmonary artery systolic pressure.    The   pulmonic valve is not well visualized. No pulmonic regurgitation   noted.The   right ventricle is normal in size and function.There is moderate   concentric   left ventricular hypertrophy. The left ventricular wall motion is   normal. The   left ventricular ejection fraction is 63%.  No aortic root   dilatation.There is   no pericardial effusion.When compared to prior study performed on   10/21/16,   there isno significant change.    < end of copied text >      	  ASSESSMENT/PLAN:  68F with AFib on apixaban, T2DM, severe asthma/COPD, anal SCC s/p chemo/XRT/protectomy, adrenal insufficiency on steroids, CKD who was admitted for COPD exacerbation and also treated for perianal abscess. The primary team was planning to discharge home on 2/5, but patient developed palpitations and was monitored on tele overnight. Cardiology consulted for evaluation prior to discharge at patient's request.       #Palpitations  #Paroxysmal AFib  - Likely had paroxysmal AFib  - Not on a BB possibly because of her severe asthma. If palpitations recur, may consider starting a BB if okay from pulmonary perspective  - NSR on tele overnight  - Continue home digoxin and apixaban 	    #Clearance for pulmonary rehab  - Patient hoping to start pulmonary rehab  - She has had no recent CP, no signs/symptoms of heart failure, no severe valvular abnormalities or arrhythmias  - It is safe for her to participate in pulmonary rehab from a cardiac standpoint    Stable for discharge home from a cardiac standpoint. She may f/u with Dr. Leahy in clinic in 4 weeks.       LUISITO Dewey  Cardiology Fellow   [id number]

## 2019-02-06 NOTE — PROGRESS NOTE ADULT - ASSESSMENT
68 yr F with a PMH of severe asthma, bronchiectasis, tracheobronchomalacia s/p tracheo-bronchoplasty in 10/16 on chronic low dose Prednisone, Afib on Eliquis, DM2, HTN, Squamous cell CA of the anus on chemo/XRT, s/p abdominoperineal proctectomy for recurrent exophytic anal cancer in 7/18, recent admission to [facility] for acute hypoxic resp failure, coronavirus PNA, discharged with steroid taper who presents with fever x1 day. as per patient has "no idea where it is coming from". States SOB, wheeze and cough at baseline, not expectorating. +mild burning when urinating and pelvic fullness, discomfort. Status ostomy output is unchanged. Denies chest pain or abdominal pain otherwise. +Nausea with 1 episode of emesis this morning.     A/P: Fever for 1 day, 102 in ED. Mild leukocytosis, VBG WNL.  Otherwise nontoxic appearing.   Endorses suprapubic tenderness, dysuria, frequency - perianal abcess?  Respiratory symptoms and exam at baseline  Had received Vanc, Cefepime and flagyl as well as Solumedrol and NS bolus-- now on medrol  c/w steroids,  Symbicort, Duonebs, Spiriva, Singulair, Flonase  CT chest no PNA; no change from prior ct    See below:: 1/31- ID--DC vanco  2/4-perianal abcess-cefepime/flagyl in place, medrol 32mg  2/5-CRS conservative/ID f/up-continues resp improvement  2/6-AF-transfer to monitored bed-resp better

## 2019-02-06 NOTE — PROGRESS NOTE ADULT - PROVIDER SPECIALTY LIST ADULT
Colorectal Surgery
Hospitalist
Infectious Disease
Internal Medicine
Pulmonology
Infectious Disease
Pulmonology

## 2019-02-06 NOTE — PROGRESS NOTE ADULT - PROBLEM SELECTOR PROBLEM 4
T2DM (type 2 diabetes mellitus)
T2DM (type 2 diabetes mellitus)
Adrenal insufficiency
Adrenal insufficiency
Fever
T2DM (type 2 diabetes mellitus)
Visual changes
T2DM (type 2 diabetes mellitus)
Fever

## 2019-02-06 NOTE — PROGRESS NOTE ADULT - PROBLEM SELECTOR PROBLEM 1
Palpitations
Sepsis
Shortness of breath
Sepsis
Shortness of breath
Sepsis
Sepsis

## 2019-02-06 NOTE — PROGRESS NOTE ADULT - PROBLEM SELECTOR PLAN 1
- on presentation febrile, tachycardic, increased WBC   - suspect pulmonary vs urinary source on Vancomycin, Cefepime and azithromycin- UA negative however and patient with + RVP for coronavirus   - await blood, urine culture  - ID evaluation pending
I suspect that sourse was likely the perianl area  MRI Abdomen/Pelvis showing Peripherally enhancing collection in the perianal region, slightly to the left of midline may correlate with the reported draining perineal sinus.   Peripheral enhancement is a nonspecific finding and may be secondary to   infection or secondary to chronicity.  Continue Cefepime with Flagyl for now until we have definitive plan from crs  Urine culture negative. Blood cultures with no growth to date.
ID follow-up appreciated.  Suspect possible pneumonia versus possibly perianal abscess as source of the fever and leukocytosis on admission.  Continue Cefepime with Flagyl for PNA and abscess coverage.  CRS consult requested.  Urine culture negative.  Blood cultures with no growth to date.
likely due to paroxysmal a fib  ekg unchanged  wants to see dr. ralph, will call
multifactorial-TBM, asthma, bronchiectasis, CB, AV/AF  on RA now
At this point suspect perianal abscess as source of the sepsis on admission with pneumonia seeming less likely.  Continue Cefepime with Flagyl for abscess and PNA coverage.  Urine culture negative.  Blood cultures with no growth to date.  Wound drainage culture pending.  CRS consult noted.  I spoke to colorectal surgery resident today and expressed my concern that the patient may have presented with sepsis secondary to a perianal abscess as both ID and Pulmonary doubt that the patient had a pneumonia.  Requested follow-up and possible need for imaging.
multifactorial-TBM, asthma, bronchiectasis, CB, AV/AF  on RA now
At this point suspect perianal abscess as source of the sepsis on admission with pneumonia seeming less likely.  MRI Abdomen/Pelvis with contrast pending today.   Continue Cefepime with Flagyl for abscess and PNA coverage.  Urine culture negative. Blood cultures with no growth to date.  Wound drainage culture pending.  CRS to follow up after the MRI.
unclear if source is rectum ? or pulmonary  MRI Abdomen/Pelvis with contrast pending  Continue Cefepime with Flagyl for possible abscess and PNA coverage.  Urine culture negative. Blood cultures with no growth to date.  Wound drainage culture pending.  CRS to follow up after the MRI.

## 2019-02-06 NOTE — PROGRESS NOTE ADULT - ATTENDING COMMENTS
as above--improvement continues-except AF  multifactorial sob--AF/AV, asthma exacerbation, bronchiectasis, TBM, CB in face of corona virus  ID--perifanal abcess/infection-on cefepime/flagyl-?duration-CRS to be conservative in care  Asthma-on medrol reduced to 24mg, symbicort, spiriva, singulair-- acapella to continue  TBM-off vest rx but to continue acapella  DVT-on rx                                                                                                      DC planning now in cardiology hands.  **************Cards-as per Tosin--pt must have formal consult done  Eulalio Allison MD-Pulmonary   [phone]

## 2019-02-06 NOTE — CONSULT NOTE ADULT - REASON FOR ADMISSION
fever, increased sputum production, dysuria

## 2019-02-06 NOTE — PROGRESS NOTE ADULT - PROBLEM SELECTOR PROBLEM 7
Atrial fibrillation
DVT (deep venous thrombosis)

## 2019-02-06 NOTE — PROGRESS NOTE ADULT - PROBLEM SELECTOR PLAN 7
Continue apixaban for anticoagulation.  Continue digoxin. Digoxin level is at goal.
on ac to continue

## 2019-02-06 NOTE — PROGRESS NOTE ADULT - PROBLEM SELECTOR PLAN 6
Continue apixaban for anticoagulation.  Continue digoxin. Digoxin level is at goal.
c/w Medrol 24mg daily.
no changes from 12/18--ct f/up in 3  months for nodule
Continue apixaban for anticoagulation.  Continue digoxin.  Digoxin level ordered but never sent, will re-order.
no changes from 12/18--ct f/up in 3  months for nodule

## 2019-02-06 NOTE — PROGRESS NOTE ADULT - PROBLEM SELECTOR PLAN 2
- denies dyspnea , not requiring supplemental oxygen. Has increased sputum production.   - continue steroids, home pulmonary medications, duonebs PRN.
Clinically improving.  Continue current meds including IV solumedrol and Duoneb PRN.
Clinically improving.  Continue oral Medrol 32mg daily.  Duoneb PRN.  acapella
I suspect that sourse was likely the perianl area  MRI Abdomen/Pelvis showing Peripherally enhancing collection in the perianal region, slightly to the left of midline may correlate with the reported draining perineal sinus.   Peripheral enhancement is a nonspecific finding and may be secondary to   infection or secondary to chronicity.  Continue Cefepime with Flagyl for now until we have definitive plan from crs  Urine culture negative. Blood cultures with no growth to date.
s/p tracheoplasty  cpt by acapella only
s/p tracheoplasty  cpt by acapella only
s/p tracheoplasty  cpt by vest and acapella needed
s/p tracheoplasty  cpt by vest and acapella needed
Clinically improving.  Switched to oral Medrol, 32mg daily.  Case d/w Dr. Maldonado.  Continue current meds including IV solumedrol and Duoneb PRN.
s/p tracheoplasty  cpt by acapella only
Clinically improving.  Continue oral Medrol 32mg daily.  Duoneb PRN.
Clinically improving.  Continue oral Medrol 32mg daily.  Duoneb PRN.

## 2019-02-06 NOTE — PROGRESS NOTE ADULT - PROBLEM SELECTOR PROBLEM 6
Atrial fibrillation
Atrial fibrillation
Abnormal CT scan, chest
Adrenal insufficiency
Atrial fibrillation
Atrial fibrillation
Abnormal CT scan, chest

## 2019-02-06 NOTE — PROGRESS NOTE ADULT - SUBJECTIVE AND OBJECTIVE BOX
CHIEF COMPLAINT:  f/up for sob, TBM, bronchiectasis, asthma, recurrent PNA-corona virus- much better over all and no palpitations noted    Interval Events: transfered to monitored bed    REVIEW OF SYSTEMS:  Constitutional: No fevers or chills. No weight loss. + fatigue or generalized malaise.  Eyes: No itching or discharge from the eyes  ENT: No ear pain. No ear discharge. No nasal congestion. No post nasal drip. No epistaxis. No throat pain. No sore throat. No difficulty swallowing.   CV: No chest pain. No palpitations. No lightheadedness or dizziness.   Resp: No dyspnea at rest. + dyspnea on exertion. No orthopnea. No wheezing. less cough. No stridor. No sputum production. No chest pain with respiration.  GI: No nausea. No vomiting. No diarrhea.  MSK: No joint pain or pain in any extremities  Integumentary: No skin lesions. No pedal edema.  Neurological: No gross motor weakness. No sensory changes.  [+ ] All other systems negative  [ ] Unable to assess ROS because ________    OBJECTIVE:  ICU Vital Signs Last 24 Hrs  T(C): 36.6 (06 Feb 2019 04:59), Max: 37.1 (05 Feb 2019 13:58)  T(F): 97.9 (06 Feb 2019 04:59), Max: 98.7 (05 Feb 2019 13:58)  HR: 69 (06 Feb 2019 04:59) (69 - 96)  BP: 118/52 (06 Feb 2019 04:59) (107/69 - 145/73)  BP(mean): --  ABP: --  ABP(mean): --  RR: 18 (06 Feb 2019 04:59) (18 - 20)  SpO2: 99% (06 Feb 2019 04:59) (95% - 99%)        02-04 @ 07:01  -  02-05 @ 07:00  --------------------------------------------------------  IN: 1070 mL / OUT: 1201 mL / NET: -131 mL    02-05 @ 07:01  -  02-06 @ 05:05  --------------------------------------------------------  IN: 600 mL / OUT: 1501 mL / NET: -901 mL      CAPILLARY BLOOD GLUCOSE      POCT Blood Glucose.: 83 mg/dL (05 Feb 2019 21:41)      PHYSICAL EXAM: NAD in bed  General: Awake, alert, oriented X 3.   HEENT: Atraumatic, normocephalic.                 Mallampatti Grade 3                No nasal congestion.                No tonsillar or pharyngeal exudates.  Lymph Nodes: No palpable lymphadenopathy  Neck: No JVD. No carotid bruit.   Respiratory: Normal chest expansion                         Normal percussion                         Normal and equal air entry                         rare exp wheeze, no rhonchi or rales.  Cardiovascular: S1 S2 normal. + murmurs, rubs or gallops.   Abdomen: Soft, non-tender, non-distended. No organomegaly. Normoactive bowel sounds.  Extremities: Warm to touch. Peripheral pulse palpable. No pedal edema.   Skin: No rashes or skin lesions  Neurological: Motor and sensory examination equal and normal in all four extremities.  Psychiatry: Appropriate mood and affect.    HOSPITAL MEDICATIONS:  MEDICATIONS  (STANDING):  apixaban 5 milliGRAM(s) Oral every 12 hours  buDESOnide 160 MICROgram(s)/formoterol 4.5 MICROgram(s) Inhaler 2 Puff(s) Inhalation two times a day  dextrose 5%. 1000 milliLiter(s) (50 mL/Hr) IV Continuous <Continuous>  dextrose 50% Injectable 12.5 Gram(s) IV Push once  dextrose 50% Injectable 25 Gram(s) IV Push once  dextrose 50% Injectable 25 Gram(s) IV Push once  digoxin     Tablet 0.25 milliGRAM(s) Oral daily  docusate sodium 100 milliGRAM(s) Oral three times a day  fluticasone propionate 50 MICROgram(s)/spray Nasal Spray 1 Spray(s) Both Nostrils two times a day  insulin glargine Injectable (LANTUS) 20 Unit(s) SubCutaneous at bedtime  insulin lispro (HumaLOG) corrective regimen sliding scale   SubCutaneous three times a day before meals  insulin lispro (HumaLOG) corrective regimen sliding scale   SubCutaneous at bedtime  insulin lispro Injectable (HumaLOG) 8 Unit(s) SubCutaneous three times a day before meals  ipratropium 42 MICROgram(s) Nasal 1 Spray(s) Nasal three times a day  methylPREDNISolone 24 milliGRAM(s) Oral daily  montelukast 10 milliGRAM(s) Oral daily  pantoprazole    Tablet 40 milliGRAM(s) Oral before breakfast  polyethylene glycol 3350 17 Gram(s) Oral every 12 hours  senna 2 Tablet(s) Oral at bedtime  tiotropium 18 MICROgram(s) Capsule 1 Capsule(s) Inhalation daily    MEDICATIONS  (PRN):  acetaminophen   Tablet .. 975 milliGRAM(s) Oral every 6 hours PRN Temp greater or equal to 38C (100.4F), Mild Pain (1 - 3), Moderate Pain (4 - 6)  ALBUTerol/ipratropium for Nebulization 3 milliLiter(s) Nebulizer every 6 hours PRN Shortness of Breath and/or Wheezing  dextrose 40% Gel 15 Gram(s) Oral once PRN Blood Glucose LESS THAN 70 milliGRAM(s)/deciliter  glucagon  Injectable 1 milliGRAM(s) IntraMuscular once PRN Glucose LESS THAN 70 milligrams/deciliter  guaiFENesin  milliGRAM(s) Oral every 12 hours PRN Cough  metoclopramide 5 milliGRAM(s) Oral three times a day PRN Nausea  simethicone 80 milliGRAM(s) Chew four times a day PRN Gas      LABS:                        10.8   7.87  )-----------( 353      ( 05 Feb 2019 07:46 )             35.1     02-05    142  |  103  |  14  ----------------------------<  104<H>  4.1   |  30  |  0.64    Ca    8.8      05 Feb 2019 05:59                MICROBIOLOGY:     RADIOLOGY:  [ ] Reviewed and interpreted by me    Point of Care Ultrasound Findings:    PFT:    EKG:

## 2019-02-06 NOTE — PROGRESS NOTE ADULT - PROBLEM SELECTOR PLAN 4
Continue Lantus 15 units qhs and Humalog 8 units TID pre-meal.  Continue to monitor blood sugars.  Will likely need to decrease insulin doses as steroids further tapered.   HgA1c 8.3 earlier this month.
Continue Lantus 15 units qhs and Humalog 8 units TID pre-meal.  Continue to monitor blood sugars.  Will likely need to decrease insulin doses as steroids further tapered.   HgA1c 8.3 earlier this month.
- on solumedrol 20 mg IVSS q 12 for COPD exacerbation.
? uti vs corona virus--ID evaluation needed to clarify situation
? uti vs corona virus--ID evaluation performed--DC of vanco
?perianal abcess-ID evaluation performed--on cefepime/falgyl in place
?perianal abcess-ID evaluation performed--on cefepime/falgyl in place- transitioned to PO abx for DC
Continue Lantus 15 units qhs and Humalog 8 units TID pre-meal.  Continue to monitor blood sugars.  Will likely need to decrease insulin doses as steroids further tapered.   HgA1c 8.3 earlier this month.
Currently on solumedrol 20mg IVSS q 12 for COPD exacerbation.
Patient had visual disturbances Thursday night  Seen by ophthalmology, attribute to a vest she was using for chest PT.  Patient has stopped using the vest.  Outpatient ophthalmology follow-up recommended.
Continue Lantus 15 units qhs and Humalog 8 units TID pre-meal.  Continue to monitor blood sugars.  Will likely need to decrease insulin doses as steroids further tapered.   HgA1c 8.3 earlier this month.
?perianal abcess-ID evaluation performed--on cefepime/falgyl in place-will need to transition to PO abx for DC

## 2019-02-06 NOTE — PROGRESS NOTE ADULT - SUBJECTIVE AND OBJECTIVE BOX
Patient is a 68y old  Female who presents with a chief complaint of fever, increased sputum production, dysuria (06 Feb 2019 05:05)      SUBJECTIVE / OVERNIGHT EVENTS:    tele : sinus 60s-70s    patient feels ok. no chest pain, palpitations or sob.    last night around 7 pm patient c/o palpitations. no chest pain. ekg down no st t changes at that time    patient seen and examined at bedside.        ROS:  14 point ROS negative in detail except stated as above    MEDICATIONS  (STANDING):  apixaban 5 milliGRAM(s) Oral every 12 hours  buDESOnide 160 MICROgram(s)/formoterol 4.5 MICROgram(s) Inhaler 2 Puff(s) Inhalation two times a day  dextrose 5%. 1000 milliLiter(s) (50 mL/Hr) IV Continuous <Continuous>  dextrose 50% Injectable 12.5 Gram(s) IV Push once  dextrose 50% Injectable 25 Gram(s) IV Push once  dextrose 50% Injectable 25 Gram(s) IV Push once  digoxin     Tablet 0.25 milliGRAM(s) Oral daily  docusate sodium 100 milliGRAM(s) Oral three times a day  fluticasone propionate 50 MICROgram(s)/spray Nasal Spray 1 Spray(s) Both Nostrils two times a day  insulin glargine Injectable (LANTUS) 20 Unit(s) SubCutaneous at bedtime  insulin lispro (HumaLOG) corrective regimen sliding scale   SubCutaneous three times a day before meals  insulin lispro (HumaLOG) corrective regimen sliding scale   SubCutaneous at bedtime  insulin lispro Injectable (HumaLOG) 8 Unit(s) SubCutaneous three times a day before meals  ipratropium 42 MICROgram(s) Nasal 1 Spray(s) Nasal three times a day  methylPREDNISolone 24 milliGRAM(s) Oral daily  montelukast 10 milliGRAM(s) Oral daily  pantoprazole    Tablet 40 milliGRAM(s) Oral before breakfast  polyethylene glycol 3350 17 Gram(s) Oral every 12 hours  senna 2 Tablet(s) Oral at bedtime  tiotropium 18 MICROgram(s) Capsule 1 Capsule(s) Inhalation daily    MEDICATIONS  (PRN):  acetaminophen   Tablet .. 975 milliGRAM(s) Oral every 6 hours PRN Temp greater or equal to 38C (100.4F), Mild Pain (1 - 3), Moderate Pain (4 - 6)  ALBUTerol/ipratropium for Nebulization 3 milliLiter(s) Nebulizer every 6 hours PRN Shortness of Breath and/or Wheezing  dextrose 40% Gel 15 Gram(s) Oral once PRN Blood Glucose LESS THAN 70 milliGRAM(s)/deciliter  glucagon  Injectable 1 milliGRAM(s) IntraMuscular once PRN Glucose LESS THAN 70 milligrams/deciliter  guaiFENesin  milliGRAM(s) Oral every 12 hours PRN Cough  metoclopramide 5 milliGRAM(s) Oral three times a day PRN Nausea  simethicone 80 milliGRAM(s) Chew four times a day PRN Gas      CAPILLARY BLOOD GLUCOSE      POCT Blood Glucose.: 126 mg/dL (06 Feb 2019 07:46)  POCT Blood Glucose.: 83 mg/dL (05 Feb 2019 21:41)  POCT Blood Glucose.: 246 mg/dL (05 Feb 2019 16:56)  POCT Blood Glucose.: 208 mg/dL (05 Feb 2019 12:06)    I&O's Summary    05 Feb 2019 07:01  -  06 Feb 2019 07:00  --------------------------------------------------------  IN: 600 mL / OUT: 1501 mL / NET: -901 mL        PHYSICAL EXAM:  Vital Signs Last 24 Hrs  T(C): 36.6 (06 Feb 2019 04:59), Max: 37.1 (05 Feb 2019 13:58)  T(F): 97.9 (06 Feb 2019 04:59), Max: 98.7 (05 Feb 2019 13:58)  HR: 69 (06 Feb 2019 04:59) (69 - 96)  BP: 118/52 (06 Feb 2019 04:59) (107/69 - 145/73)  BP(mean): --  RR: 18 (06 Feb 2019 04:59) (18 - 20)  SpO2: 99% (06 Feb 2019 04:59) (95% - 99%)      PHYSICAL EXAM:  GENERAL: NAD  CARDIOVASCULAR: Normal S1, S2, rrr  CHEST: Diffuse end expiratory rhonchi  GI: Abdomen soft, Nontender. Bowel sounds present + ostomy  MSK/Ext:  No edema, clubbing or cyanosis  PSYCH: Normal Affect. AAOx3    LABS:                        10.5   7.44  )-----------( 315      ( 06 Feb 2019 07:52 )             35.0     02-06    141  |  104  |  13  ----------------------------<  188<H>  4.4   |  25  |  0.58    Ca    8.6      06 Feb 2019 06:10              Care Discussed with Consultants/Other Providers:  ALLEY Sifuentes

## 2019-02-06 NOTE — PROGRESS NOTE ADULT - PROBLEM SELECTOR PLAN 5
Now on Medrol 32mg daily. Will need slow taper to home dose (4mg daily).
Now on Medrol 32mg daily. Will need slow taper to home dose (4mg daily).
--c/w apixaban for A/C. C/w digoxin. Check dig level.
Alvino--needs formal evaln here
Continue Lantus 15 units qhs and Humalog 8 units TID pre-meal.  Continue to monitor blood sugars.  Will likely need to decrease insulin doses as steroids further tapered.   HgA1c 8.3 earlier this month.
Continue apixaban for anticoagulation.  Continue digoxin.
Tosin mercado al
c/w Medrol 32mg daily. Will need slow taper to home dose (4mg daily).
Now on Medrol 32mg daily.  Home dose is 4mg daily per patient.
Tosin mercado al

## 2019-02-06 NOTE — PROGRESS NOTE ADULT - PROBLEM SELECTOR PROBLEM 2
COPD exacerbation
Sepsis
Tracheobronchomalacia
COPD exacerbation
Tracheobronchomalacia
COPD exacerbation
COPD exacerbation

## 2019-02-06 NOTE — CONSULT NOTE ADULT - ATTENDING COMMENTS
Patient interviewed and examined. I was physically present for the essential portions of the E/M service provided.  Chart reviewed and note edited where appropriate.  Case discussed with fellow.  Agree w/ Assessment and Plan as outlined.    Nic Trimble MD [facility]  Spectra:  [phone]  Office: [phone]

## 2019-02-06 NOTE — PROGRESS NOTE ADULT - PROBLEM SELECTOR PROBLEM 5
Adrenal insufficiency
Atrial fibrillation
T2DM (type 2 diabetes mellitus)
Adrenal insufficiency
Atrial fibrillation

## 2019-02-13 ENCOUNTER — APPOINTMENT (OUTPATIENT)
Dept: PULMONOLOGY | Facility: CLINIC | Age: 69
End: 2019-02-13
Payer: MEDICARE

## 2019-02-13 ENCOUNTER — APPOINTMENT (OUTPATIENT)
Dept: CT IMAGING | Facility: IMAGING CENTER | Age: 69
End: 2019-02-13

## 2019-02-13 PROCEDURE — 94618 PULMONARY STRESS TESTING: CPT

## 2019-02-13 PROCEDURE — 94729 DIFFUSING CAPACITY: CPT

## 2019-02-13 PROCEDURE — 94726 PLETHYSMOGRAPHY LUNG VOLUMES: CPT

## 2019-02-13 PROCEDURE — 94060 EVALUATION OF WHEEZING: CPT | Mod: 59

## 2019-02-14 ENCOUNTER — APPOINTMENT (OUTPATIENT)
Dept: PULMONOLOGY | Facility: CLINIC | Age: 69
End: 2019-02-14
Payer: MEDICARE

## 2019-02-14 VITALS
WEIGHT: 141 LBS | SYSTOLIC BLOOD PRESSURE: 110 MMHG | DIASTOLIC BLOOD PRESSURE: 70 MMHG | HEART RATE: 96 BPM | HEIGHT: 66 IN | RESPIRATION RATE: 17 BRPM | OXYGEN SATURATION: 96 % | BODY MASS INDEX: 22.66 KG/M2

## 2019-02-14 PROCEDURE — 96372 THER/PROPH/DIAG INJ SC/IM: CPT

## 2019-02-14 PROCEDURE — 99214 OFFICE O/P EST MOD 30 MIN: CPT | Mod: 25

## 2019-02-14 RX ORDER — OMALIZUMAB 202.5 MG/1.4ML
150 INJECTION, SOLUTION SUBCUTANEOUS
Qty: 45 | Refills: 0 | Status: COMPLETED | OUTPATIENT
Start: 2019-02-14

## 2019-02-14 RX ADMIN — OMALIZUMAB 0 MG: 202.5 INJECTION, SOLUTION SUBCUTANEOUS at 00:00

## 2019-02-14 NOTE — HISTORY OF PRESENT ILLNESS
[FreeTextEntry1] : Ms. Bloom is a 68 year old female with a history of abnormal CXR/chest CT, allergic rhinitis, severe persistent asthma, bronchiectasis, GERD, COPD, inflammation of lung, PNA, PND, s/p tracheoplasty, TBM, and SOB presenting to the office today for a follow up visit. Her chief complaint is recent hospitalization. \par -she has recently been released from the hospital for a viral induced bronchitis, as well as a perianal abscess\par -she reports that she has frequent chest pressure. she has been using her acapella device, which she feels is helping her to bring up mucus better than the chest vest\par -she notes that she has been feeling very fatigued lately following her hospitalization. she will be starting rehab in 2 weeks\par -she frequently feels as if her heart is racing\par -she has not been sleeping well, as her sleep is frequently interrupted throughout the night\par -she denies any headaches, nausea, vomiting, fever, chills, sweats, chest pain, diarrhea, constipation, dysphagia, dizziness, leg swelling, leg pain, itchy eyes, itchy ears, heartburn, reflux, or sour taste in the mouth.

## 2019-02-14 NOTE — ADDENDUM
[FreeTextEntry1] : All medical record entries made by rogers Arredondo were at Dr. Eulalio Allison's, direction and personally dictated by me on 02/14/2019. I have reviewed the chart and agree that the record accurately reflects my personal performance of the history, physical exam, assessment and plan. I have also personally directed, reviewed, and agree with the discharge instructions.

## 2019-02-14 NOTE — PHYSICAL EXAM
[General Appearance - Well Developed] : well developed [Normal Appearance] : normal appearance [Well Groomed] : well groomed [General Appearance - Well Nourished] : well nourished [No Deformities] : no deformities [General Appearance - In No Acute Distress] : no acute distress [Normal Conjunctiva] : the conjunctiva exhibited no abnormalities [Eyelids - No Xanthelasma] : the eyelids demonstrated no xanthelasmas [Normal Oropharynx] : normal oropharynx [Neck Appearance] : the appearance of the neck was normal [Neck Cervical Mass (___cm)] : no neck mass was observed [Jugular Venous Distention Increased] : there was no jugular-venous distention [Thyroid Diffuse Enlargement] : the thyroid was not enlarged [Thyroid Nodule] : there were no palpable thyroid nodules [Heart Rate And Rhythm] : heart rate and rhythm were normal [Heart Sounds] : normal S1 and S2 [Respiration, Rhythm And Depth] : normal respiratory rhythm and effort [Exaggerated Use Of Accessory Muscles For Inspiration] : no accessory muscle use [Abdomen Soft] : soft [Abdomen Tenderness] : non-tender [Abdomen Mass (___ Cm)] : no abdominal mass palpated [Abnormal Walk] : normal gait [Gait - Sufficient For Exercise Testing] : the gait was sufficient for exercise testing [Nail Clubbing] : no clubbing of the fingernails [Cyanosis, Localized] : no localized cyanosis [Petechial Hemorrhages (___cm)] : no petechial hemorrhages [Skin Color & Pigmentation] : normal skin color and pigmentation [] : no rash [No Venous Stasis] : no venous stasis [Skin Lesions] : no skin lesions [No Skin Ulcers] : no skin ulcer [No Xanthoma] : no  xanthoma was observed [Deep Tendon Reflexes (DTR)] : deep tendon reflexes were 2+ and symmetric [Sensation] : the sensory exam was normal to light touch and pinprick [No Focal Deficits] : no focal deficits [Oriented To Time, Place, And Person] : oriented to person, place, and time [Impaired Insight] : insight and judgment were intact [Affect] : the affect was normal [II] : II [FreeTextEntry1] : ostomy in place

## 2019-02-14 NOTE — REASON FOR VISIT
[Follow-Up] : a follow-up visit [FreeTextEntry1] : abnormal CXR/chest CT, allergic rhinitis, severe persistent asthma, bronchiectasis, GERD, COPD, inflammation of lung, PNA, PND, s/p tracheoplasty, TBM, and SOB

## 2019-02-15 ENCOUNTER — APPOINTMENT (OUTPATIENT)
Dept: COLORECTAL SURGERY | Facility: CLINIC | Age: 69
End: 2019-02-15
Payer: MEDICARE

## 2019-02-15 PROCEDURE — 99213 OFFICE O/P EST LOW 20 MIN: CPT

## 2019-02-19 NOTE — HISTORY OF PRESENT ILLNESS
[FreeTextEntry1] : Very pleasant 68-year-old female who presents for follow-up visit. 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 Patient's perineal wound was very slow to heal but eventually did so. During a recent hospitalization for what was felt to be pneumonia the wound opened and drained.  An MRI was done showing a 3 x 2.5 cm collection. The wound has been packed with quarter-inch packing and recently can no longer be packed. She has no pain or drainage at this point.\par \par Inspection of the anorectal area reveals a small dimple at the perineal wound site. No purulence or drainage is present currently.\par \par I explained to the patient that this wound can be problematic since she did have radiation preop, which can result in a long-standing nonhealing wound. The only other option would be to excise the wound which is a sizable operative intervention.\par \par For now we'll simply observe.

## 2019-02-25 ENCOUNTER — APPOINTMENT (OUTPATIENT)
Dept: PULMONOLOGY | Facility: CLINIC | Age: 69
End: 2019-02-25
Payer: MEDICARE

## 2019-02-25 PROCEDURE — G0424: CPT

## 2019-02-27 ENCOUNTER — APPOINTMENT (OUTPATIENT)
Dept: PULMONOLOGY | Facility: CLINIC | Age: 69
End: 2019-02-27
Payer: MEDICARE

## 2019-02-27 PROCEDURE — G0424: CPT

## 2019-02-28 ENCOUNTER — APPOINTMENT (OUTPATIENT)
Dept: PULMONOLOGY | Facility: CLINIC | Age: 69
End: 2019-02-28
Payer: MEDICARE

## 2019-02-28 VITALS
SYSTOLIC BLOOD PRESSURE: 120 MMHG | OXYGEN SATURATION: 96 % | WEIGHT: 147 LBS | HEIGHT: 66 IN | BODY MASS INDEX: 23.63 KG/M2 | RESPIRATION RATE: 16 BRPM | HEART RATE: 83 BPM | DIASTOLIC BLOOD PRESSURE: 64 MMHG

## 2019-02-28 PROCEDURE — 96372 THER/PROPH/DIAG INJ SC/IM: CPT

## 2019-02-28 RX ORDER — OMALIZUMAB 202.5 MG/1.4ML
150 INJECTION, SOLUTION SUBCUTANEOUS
Qty: 45 | Refills: 0 | Status: COMPLETED | OUTPATIENT
Start: 2019-02-28

## 2019-02-28 RX ADMIN — OMALIZUMAB 0 MG: 202.5 INJECTION, SOLUTION SUBCUTANEOUS at 00:00

## 2019-03-01 ENCOUNTER — APPOINTMENT (OUTPATIENT)
Dept: PULMONOLOGY | Facility: CLINIC | Age: 69
End: 2019-03-01
Payer: MEDICARE

## 2019-03-01 PROCEDURE — G0424: CPT

## 2019-03-04 ENCOUNTER — APPOINTMENT (OUTPATIENT)
Dept: PULMONOLOGY | Facility: CLINIC | Age: 69
End: 2019-03-04
Payer: MEDICARE

## 2019-03-04 PROCEDURE — G0424: CPT

## 2019-03-06 ENCOUNTER — APPOINTMENT (OUTPATIENT)
Dept: PULMONOLOGY | Facility: CLINIC | Age: 69
End: 2019-03-06
Payer: MEDICARE

## 2019-03-06 PROCEDURE — G0424: CPT

## 2019-03-08 ENCOUNTER — APPOINTMENT (OUTPATIENT)
Dept: PULMONOLOGY | Facility: CLINIC | Age: 69
End: 2019-03-08
Payer: MEDICARE

## 2019-03-08 PROCEDURE — G0424: CPT

## 2019-03-11 ENCOUNTER — APPOINTMENT (OUTPATIENT)
Dept: PULMONOLOGY | Facility: CLINIC | Age: 69
End: 2019-03-11

## 2019-03-12 ENCOUNTER — APPOINTMENT (OUTPATIENT)
Dept: PULMONOLOGY | Facility: CLINIC | Age: 69
End: 2019-03-12
Payer: MEDICARE

## 2019-03-12 VITALS
HEIGHT: 66 IN | HEART RATE: 78 BPM | DIASTOLIC BLOOD PRESSURE: 60 MMHG | BODY MASS INDEX: 23.63 KG/M2 | OXYGEN SATURATION: 92 % | SYSTOLIC BLOOD PRESSURE: 132 MMHG | WEIGHT: 147 LBS

## 2019-03-12 PROCEDURE — 96372 THER/PROPH/DIAG INJ SC/IM: CPT

## 2019-03-12 RX ORDER — OMALIZUMAB 150 MG/ML
150 INJECTION, SOLUTION SUBCUTANEOUS
Qty: 0 | Refills: 0 | Status: COMPLETED | OUTPATIENT
Start: 2019-03-12

## 2019-03-12 RX ORDER — OMALIZUMAB 75 MG/.5ML
75 INJECTION, SOLUTION SUBCUTANEOUS
Qty: 0 | Refills: 0 | Status: COMPLETED | OUTPATIENT
Start: 2019-03-12

## 2019-03-12 RX ADMIN — OMALIZUMAB 150 MG/ML: 150 INJECTION, SOLUTION SUBCUTANEOUS at 00:00

## 2019-03-12 RX ADMIN — OMALIZUMAB 0 MG/0.5ML: 75 INJECTION, SOLUTION SUBCUTANEOUS at 00:00

## 2019-03-13 ENCOUNTER — APPOINTMENT (OUTPATIENT)
Dept: PULMONOLOGY | Facility: CLINIC | Age: 69
End: 2019-03-13
Payer: MEDICARE

## 2019-03-13 PROCEDURE — G0424: CPT

## 2019-03-14 ENCOUNTER — APPOINTMENT (OUTPATIENT)
Dept: HEART AND VASCULAR | Facility: CLINIC | Age: 69
End: 2019-03-14
Payer: MEDICARE

## 2019-03-14 ENCOUNTER — APPOINTMENT (OUTPATIENT)
Dept: HEART AND VASCULAR | Facility: CLINIC | Age: 69
End: 2019-03-14

## 2019-03-14 VITALS
RESPIRATION RATE: 14 BRPM | TEMPERATURE: 97.8 F | OXYGEN SATURATION: 95 % | BODY MASS INDEX: 22.98 KG/M2 | HEART RATE: 71 BPM | HEIGHT: 66 IN | WEIGHT: 143 LBS | DIASTOLIC BLOOD PRESSURE: 80 MMHG | SYSTOLIC BLOOD PRESSURE: 150 MMHG

## 2019-03-14 DIAGNOSIS — I35.1 NONRHEUMATIC AORTIC (VALVE) INSUFFICIENCY: ICD-10-CM

## 2019-03-14 PROCEDURE — 93306 TTE W/DOPPLER COMPLETE: CPT

## 2019-03-14 PROCEDURE — 99214 OFFICE O/P EST MOD 30 MIN: CPT

## 2019-03-14 NOTE — DISCUSSION/SUMMARY
[FreeTextEntry1] : venipuncture attempted - failed  secondary to dehydration- Rx provided for [facility] blood draw \par \par echocardiogram  shows normal LVEF with concentric LVH and moderately severe aortic regurgitation \par \par \par follow up in 4 months \par \par instructed to increase hydration

## 2019-03-14 NOTE — HISTORY OF PRESENT ILLNESS
[FreeTextEntry1] : Patient developed perirectal abscess secondary to radiation damage  but has wound care nurse daily  and is healing gradually. Patient eating much better, feeling much stronger, and has put on weight , gained ~8 lbs \par \par Had PAF with  rectal infection, febrile,  and rectal abscess bursted and drained on its own \par \par No longer on antibiotics \par \par No syncope, fevers, chills, night sweats\par \par Convinced she does not have hypertension but there is LVH on echocardiogram and I always find her BP elevated\par \par She is dehydrated today

## 2019-03-14 NOTE — ASSESSMENT
[FreeTextEntry1] : PAF on Eliquis 5mg po bid\par \par \par chronic moderately severe aortic regurgitation \par \par \par Chronic bronchiectasis/COPD\par \par LVH

## 2019-03-14 NOTE — REASON FOR VISIT
[Follow-Up - Clinic] : a clinic follow-up of [Anticoagulation] : anticoagulation [Atrial Fibrillation] : atrial fibrillation [Hypertension] : hypertension [Medication Management] : Medication management [FreeTextEntry1] : 69 yo female with hx of seizure ,  hx of rectal cancer s/p recent completion of radiation and chemotherapy and  + PET scan showing residual disease  which necessitated  surgery in July 2018 . Residual rectal cancer removed with  all 14 lymph nodes negative for disease. Ostomy bag in place. Brain MRI was negative for metastatic disease. \par \par Chronic asthma with respiratory infections and tracheomalacia that was surgically repaired last year . Has nonvalvular afib and is on Eliquis   without bleeding. \par \par There is  hx of moderate -severe chronic aortic regurgitation. \par \par

## 2019-03-14 NOTE — PHYSICAL EXAM
[General Appearance - Well Developed] : well developed [Well Groomed] : well groomed [General Appearance - Well Nourished] : well nourished [No Deformities] : no deformities [General Appearance - In No Acute Distress] : no acute distress [Normal Conjunctiva] : the conjunctiva exhibited no abnormalities [Eyelids - No Xanthelasma] : the eyelids demonstrated no xanthelasmas [Normal Oral Mucosa] : normal oral mucosa [No Oral Pallor] : no oral pallor [No Oral Cyanosis] : no oral cyanosis [Normal Jugular Venous A Waves Present] : normal jugular venous A waves present [Normal Jugular Venous V Waves Present] : normal jugular venous V waves present [No Jugular Venous Espino A Waves] : no jugular venous espino A waves [Respiration, Rhythm And Depth] : normal respiratory rhythm and effort [Exaggerated Use Of Accessory Muscles For Inspiration] : no accessory muscle use [Auscultation Breath Sounds / Voice Sounds] : lungs were clear to auscultation bilaterally [Heart Rate And Rhythm] : heart rate and rhythm were normal [Heart Sounds] : normal S1 and S2 [Edema] : no peripheral edema present [Systolic grade ___/6] : A grade [unfilled]/6 systolic murmur was heard. [Bowel Sounds] : normal bowel sounds [Abdomen Tenderness] : non-tender [Abnormal Walk] : normal gait [Gait - Sufficient For Exercise Testing] : the gait was sufficient for exercise testing [Nail Clubbing] : no clubbing of the fingernails [Cyanosis, Localized] : no localized cyanosis [Petechial Hemorrhages (___cm)] : no petechial hemorrhages [Nail Splinter Hemorrhages] : no splinter hemorrhages of the nails [Fingers Osler's Nodes] : Osler's nodes were not seenon the fingers [Skin Color & Pigmentation] : normal skin color and pigmentation [] : no rash [No Venous Stasis] : no venous stasis [Skin Lesions] : no skin lesions [No Skin Ulcers] : no skin ulcer [No Xanthoma] : no  xanthoma was observed [Oriented To Time, Place, And Person] : oriented to person, place, and time [Impaired Insight] : insight and judgment were intact [Affect] : the affect was normal [Mood] : the mood was normal [Memory Recent] : recent memory was not impaired [Memory Remote] : remote memory was not impaired [FreeTextEntry1] : mildly decreased skin turgor

## 2019-03-14 NOTE — REVIEW OF SYSTEMS
[Recent Weight Gain (___ Lbs)] : recent [unfilled] ~Ulb weight gain [Dyspnea on exertion] : dyspnea during exertion [Cough] : cough [Negative] : Heme/Lymph [Feeling Fatigued] : not feeling fatigued [Recent Weight Loss (___ Lbs)] : no recent weight loss [Wheezing] : no wheezing [Coughing Up Blood] : no hemoptysis

## 2019-03-15 ENCOUNTER — APPOINTMENT (OUTPATIENT)
Dept: PULMONOLOGY | Facility: CLINIC | Age: 69
End: 2019-03-15
Payer: MEDICARE

## 2019-03-15 PROCEDURE — G0424: CPT

## 2019-03-18 ENCOUNTER — APPOINTMENT (OUTPATIENT)
Dept: PULMONOLOGY | Facility: CLINIC | Age: 69
End: 2019-03-18

## 2019-03-20 ENCOUNTER — APPOINTMENT (OUTPATIENT)
Dept: PULMONOLOGY | Facility: CLINIC | Age: 69
End: 2019-03-20

## 2019-03-20 ENCOUNTER — FORM ENCOUNTER (OUTPATIENT)
Age: 69
End: 2019-03-20

## 2019-03-21 ENCOUNTER — APPOINTMENT (OUTPATIENT)
Dept: CT IMAGING | Facility: IMAGING CENTER | Age: 69
End: 2019-03-21
Payer: MEDICARE

## 2019-03-21 ENCOUNTER — OUTPATIENT (OUTPATIENT)
Dept: OUTPATIENT SERVICES | Facility: HOSPITAL | Age: 69
LOS: 1 days | End: 2019-03-21
Payer: MEDICARE

## 2019-03-21 DIAGNOSIS — Z98.890 OTHER SPECIFIED POSTPROCEDURAL STATES: Chronic | ICD-10-CM

## 2019-03-21 DIAGNOSIS — Z98.89 OTHER SPECIFIED POSTPROCEDURAL STATES: Chronic | ICD-10-CM

## 2019-03-21 DIAGNOSIS — H05.352 EXOSTOSIS OF LEFT ORBIT: Chronic | ICD-10-CM

## 2019-03-21 DIAGNOSIS — K62.5 HEMORRHAGE OF ANUS AND RECTUM: Chronic | ICD-10-CM

## 2019-03-21 DIAGNOSIS — Z87.09 PERSONAL HISTORY OF OTHER DISEASES OF THE RESPIRATORY SYSTEM: Chronic | ICD-10-CM

## 2019-03-21 DIAGNOSIS — R93.89 ABNORMAL FINDINGS ON DIAGNOSTIC IMAGING OF OTHER SPECIFIED BODY STRUCTURES: ICD-10-CM

## 2019-03-21 DIAGNOSIS — Z96.653 PRESENCE OF ARTIFICIAL KNEE JOINT, BILATERAL: Chronic | ICD-10-CM

## 2019-03-21 PROCEDURE — 71250 CT THORAX DX C-: CPT | Mod: 26

## 2019-03-21 PROCEDURE — 71250 CT THORAX DX C-: CPT

## 2019-03-22 ENCOUNTER — APPOINTMENT (OUTPATIENT)
Dept: PULMONOLOGY | Facility: CLINIC | Age: 69
End: 2019-03-22

## 2019-03-25 ENCOUNTER — APPOINTMENT (OUTPATIENT)
Dept: PULMONOLOGY | Facility: CLINIC | Age: 69
End: 2019-03-25

## 2019-03-26 ENCOUNTER — APPOINTMENT (OUTPATIENT)
Dept: PULMONOLOGY | Facility: CLINIC | Age: 69
End: 2019-03-26

## 2019-03-26 ENCOUNTER — CLINICAL ADVICE (OUTPATIENT)
Age: 69
End: 2019-03-26

## 2019-03-27 ENCOUNTER — APPOINTMENT (OUTPATIENT)
Dept: PULMONOLOGY | Facility: CLINIC | Age: 69
End: 2019-03-27

## 2019-03-28 ENCOUNTER — APPOINTMENT (OUTPATIENT)
Dept: WOUND CARE | Facility: CLINIC | Age: 69
End: 2019-03-28
Payer: MEDICARE

## 2019-03-28 PROCEDURE — 99213 OFFICE O/P EST LOW 20 MIN: CPT | Mod: 25

## 2019-03-28 PROCEDURE — 93922 UPR/L XTREMITY ART 2 LEVELS: CPT

## 2019-03-28 NOTE — PHYSICAL EXAM
[JVD] : no jugular venous distention  [Respiratory Effort] : normal respiratory effort [Normal Rate and Rhythm] : normal rate and rhythm [2+] : left 2+ [Ankle Swelling (On Exam)] : not present [Abdomen Tenderness] : ~T ~M No abdominal tenderness [No HSM] : no hepatosplenomegaly [No Rash or Lesion] : No rash or lesion [Oriented to Person] : oriented to person [Oriented to Place] : oriented to place [Calm] : calm [de-identified] : nad [de-identified] : coughing [de-identified] : port right chest wall [de-identified] : stoma llq [de-identified] : urine ok- not incontinent [de-identified] : rom intact/ uses drive walker [FreeTextEntry1] : sacral cleft [FreeTextEntry2] : .1 [FreeTextEntry3] : .1 [FreeTextEntry4] : .1 [de-identified] : aquacel [FreeTextEntry7] : left great toe [FreeTextEntry8] : 1.2 [FreeTextEntry9] : .5 [de-identified] : .1 [de-identified] : silvadene [de-identified] : medial nail removed

## 2019-03-28 NOTE — HISTORY OF PRESENT ILLNESS
[FreeTextEntry1] : 68 yr old had almost healing, then had a bleeding episode, then had an abscess; monday had toe nail removed by podiatrist for infection , on cipro\par location- anal ; left great toe\par severity mild, no pain , no fever, VNA just finished\par last mri july, recurrent disease at anal verge; ct in march port, miguel lung opacities; dec ct llq ostomy pubic symphisi with screws, one in right sacroiliac joint, no bony lesions, no recurrent disease\par timing/duration stopped nov 16, started on nov 27; last discharge jan 11-19 and jan 29-feb5  copd exacerbation, pneumonia, intergluteal fistula sinus\par quality- blood output, the rest was leaking\par had radiation 20 in ten days and 6 weeks\par  asthma / medrol every day\par  had a vac in past\par

## 2019-03-28 NOTE — ASSESSMENT
[FreeTextEntry1] : 67 yo b woman with  with scar from apr surgery for  sq cell cancer  t2no with radiation,\par  s/p fistula in incision, on cipro\par asthmatic, s/p copd recurrence on medrol, with stoma, recent admit for pneumonia on \par currently  draining-on/off for anal scar alginate  \par great toe silvadene\par discussed use of hbo for radiated wounds, no pneumothorax history\par minimal bleeding and wound open- pocket with scar\par  complexity-moderate lab, xr, old record reviewed, test results-reviewed, no need for imaging currently, some discrpteancy between mr in summer and recent ct- may need repeat mri r/o soft tissue/ osteo necrosis in radiated field\par risk- high for surgery in radiated field- no surgical debridement at this time\par continue offloading\par follow up in 2 weeks

## 2019-03-29 ENCOUNTER — APPOINTMENT (OUTPATIENT)
Dept: PULMONOLOGY | Facility: CLINIC | Age: 69
End: 2019-03-29

## 2019-04-01 ENCOUNTER — APPOINTMENT (OUTPATIENT)
Dept: PULMONOLOGY | Facility: CLINIC | Age: 69
End: 2019-04-01

## 2019-04-03 ENCOUNTER — APPOINTMENT (OUTPATIENT)
Dept: COLORECTAL SURGERY | Facility: CLINIC | Age: 69
End: 2019-04-03
Payer: MEDICARE

## 2019-04-03 ENCOUNTER — APPOINTMENT (OUTPATIENT)
Dept: PULMONOLOGY | Facility: CLINIC | Age: 69
End: 2019-04-03

## 2019-04-03 PROCEDURE — 17250 CHEM CAUT OF GRANLTJ TISSUE: CPT

## 2019-04-03 NOTE — PROGRESS NOTE ADULT - PROBLEM SELECTOR PLAN 3
- pt has history of paraxoysmal Afib  - continue with eliquis 5 mg bid  - continue with digoxin. 25 mg daily 03-Apr-2019 11:14

## 2019-04-04 ENCOUNTER — APPOINTMENT (OUTPATIENT)
Dept: PULMONOLOGY | Facility: CLINIC | Age: 69
End: 2019-04-04
Payer: MEDICARE

## 2019-04-04 RX ADMIN — OMALIZUMAB 75 MG/0.5ML: 75 INJECTION, SOLUTION SUBCUTANEOUS at 00:00

## 2019-04-04 RX ADMIN — OMALIZUMAB 150 MG/ML: 150 INJECTION, SOLUTION SUBCUTANEOUS at 00:00

## 2019-04-05 ENCOUNTER — APPOINTMENT (OUTPATIENT)
Dept: PULMONOLOGY | Facility: CLINIC | Age: 69
End: 2019-04-05

## 2019-04-08 ENCOUNTER — APPOINTMENT (OUTPATIENT)
Dept: PULMONOLOGY | Facility: CLINIC | Age: 69
End: 2019-04-08

## 2019-04-08 NOTE — HISTORY OF PRESENT ILLNESS
[FreeTextEntry1] : 68-year-old female who presents for follow-up visit. 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 Patient has multiple medical problems including severe pulmonary issues and cardiac issues. Despite these comorbidities she is doing extremely well after her major operation.\par \par She presents today with concerns of some abnormality at the mucocutaneous junction of her stoma. She is also concerned about her perineal wound.\par \par Physical examination of her abdomen reveals a totally soft and nontender abdomen. Her existent ostomy appliance was taken down and the patient was found to have a 3-4 mm area of granulation tissue at approximately the 8 o'clock position of her colostomy. This was cauterized with silver nitrate and a new appliance was placed. Her perineal wound was examined and I see a small dimple but this wound looks extremely good. There is no tenderness, erythema or significant drainage.\par \par I assured the patient that neither of these problems is life-threatening. If the granulation tissue presents an ongoing issue, this can simply be excised under some sedation in my outpatient unit. Her anticoagulant meds would be held for 3 days prior to the surgery. I reassured her that no further treatment is required for her perineal wound.

## 2019-04-09 ENCOUNTER — APPOINTMENT (OUTPATIENT)
Dept: PULMONOLOGY | Facility: CLINIC | Age: 69
End: 2019-04-09
Payer: MEDICARE

## 2019-04-09 ENCOUNTER — RESULT REVIEW (OUTPATIENT)
Age: 69
End: 2019-04-09

## 2019-04-09 ENCOUNTER — OUTPATIENT (OUTPATIENT)
Dept: OUTPATIENT SERVICES | Facility: HOSPITAL | Age: 69
LOS: 1 days | Discharge: HOME | End: 2019-04-09

## 2019-04-09 ENCOUNTER — MEDICATION RENEWAL (OUTPATIENT)
Age: 69
End: 2019-04-09

## 2019-04-09 ENCOUNTER — APPOINTMENT (OUTPATIENT)
Dept: HEMATOLOGY ONCOLOGY | Facility: CLINIC | Age: 69
End: 2019-04-09

## 2019-04-09 ENCOUNTER — LABORATORY RESULT (OUTPATIENT)
Age: 69
End: 2019-04-09

## 2019-04-09 VITALS
SYSTOLIC BLOOD PRESSURE: 110 MMHG | HEIGHT: 66 IN | WEIGHT: 143 LBS | OXYGEN SATURATION: 97 % | RESPIRATION RATE: 17 BRPM | DIASTOLIC BLOOD PRESSURE: 70 MMHG | BODY MASS INDEX: 22.98 KG/M2 | HEART RATE: 63 BPM

## 2019-04-09 DIAGNOSIS — Z98.89 OTHER SPECIFIED POSTPROCEDURAL STATES: Chronic | ICD-10-CM

## 2019-04-09 DIAGNOSIS — Z87.09 PERSONAL HISTORY OF OTHER DISEASES OF THE RESPIRATORY SYSTEM: Chronic | ICD-10-CM

## 2019-04-09 DIAGNOSIS — H05.352 EXOSTOSIS OF LEFT ORBIT: Chronic | ICD-10-CM

## 2019-04-09 DIAGNOSIS — C18.9 MALIGNANT NEOPLASM OF COLON, UNSPECIFIED: ICD-10-CM

## 2019-04-09 DIAGNOSIS — Z96.653 PRESENCE OF ARTIFICIAL KNEE JOINT, BILATERAL: Chronic | ICD-10-CM

## 2019-04-09 DIAGNOSIS — K62.5 HEMORRHAGE OF ANUS AND RECTUM: Chronic | ICD-10-CM

## 2019-04-09 DIAGNOSIS — Z98.890 OTHER SPECIFIED POSTPROCEDURAL STATES: Chronic | ICD-10-CM

## 2019-04-09 LAB
BASOPHILS # BLD AUTO: 0 K/UL — SIGNIFICANT CHANGE UP (ref 0–0.2)
BASOPHILS NFR BLD AUTO: 0.1 % — SIGNIFICANT CHANGE UP (ref 0–2)
EOSINOPHIL # BLD AUTO: 0.2 K/UL — SIGNIFICANT CHANGE UP (ref 0–0.5)
EOSINOPHIL NFR BLD AUTO: 2.8 % — SIGNIFICANT CHANGE UP (ref 0–6)
HCT VFR BLD CALC: 38 % — SIGNIFICANT CHANGE UP (ref 34.5–45)
HGB BLD-MCNC: 12.8 G/DL — SIGNIFICANT CHANGE UP (ref 11.5–15.5)
LYMPHOCYTES # BLD AUTO: 1.3 K/UL — SIGNIFICANT CHANGE UP (ref 1–3.3)
LYMPHOCYTES # BLD AUTO: 16.6 % — SIGNIFICANT CHANGE UP (ref 13–44)
MCHC RBC-ENTMCNC: 24.2 PG — LOW (ref 27–34)
MCHC RBC-ENTMCNC: 33.7 G/DL — SIGNIFICANT CHANGE UP (ref 32–36)
MCV RBC AUTO: 71.8 FL — LOW (ref 80–100)
MONOCYTES # BLD AUTO: 0.5 K/UL — SIGNIFICANT CHANGE UP (ref 0–0.9)
MONOCYTES NFR BLD AUTO: 6.6 % — SIGNIFICANT CHANGE UP (ref 2–14)
NEUTROPHILS # BLD AUTO: 5.6 K/UL — SIGNIFICANT CHANGE UP (ref 1.8–7.4)
NEUTROPHILS NFR BLD AUTO: 74 % — SIGNIFICANT CHANGE UP (ref 43–77)
PLATELET # BLD AUTO: 271 K/UL — SIGNIFICANT CHANGE UP (ref 150–400)
RBC # BLD: 5.3 M/UL — HIGH (ref 3.8–5.2)
RBC # FLD: 16.7 % — HIGH (ref 10.3–14.5)
WBC # BLD: 7.6 K/UL — SIGNIFICANT CHANGE UP (ref 3.8–10.5)
WBC # FLD AUTO: 7.6 K/UL — SIGNIFICANT CHANGE UP (ref 3.8–10.5)

## 2019-04-09 PROCEDURE — 96372 THER/PROPH/DIAG INJ SC/IM: CPT

## 2019-04-09 RX ORDER — OMALIZUMAB 75 MG/.5ML
75 INJECTION, SOLUTION SUBCUTANEOUS
Qty: 0 | Refills: 0 | Status: COMPLETED | OUTPATIENT
Start: 2019-04-04

## 2019-04-09 RX ORDER — OMALIZUMAB 150 MG/ML
150 INJECTION, SOLUTION SUBCUTANEOUS
Qty: 0 | Refills: 0 | Status: COMPLETED | OUTPATIENT
Start: 2019-04-04

## 2019-04-10 ENCOUNTER — APPOINTMENT (OUTPATIENT)
Dept: PULMONOLOGY | Facility: CLINIC | Age: 69
End: 2019-04-10

## 2019-04-10 LAB
ALBUMIN SERPL ELPH-MCNC: 4.3 G/DL
ALP BLD-CCNC: 97 U/L
ALT SERPL-CCNC: 13 U/L
ANION GAP SERPL CALC-SCNC: 17 MMOL/L
AST SERPL-CCNC: 23 U/L
BASOPHILS # BLD AUTO: 0.02 K/UL
BASOPHILS NFR BLD AUTO: 0.3 %
BILIRUB SERPL-MCNC: <0.2 MG/DL
BUN SERPL-MCNC: 9 MG/DL
CALCIUM SERPL-MCNC: 9.2 MG/DL
CHLORIDE SERPL-SCNC: 101 MMOL/L
CHOLEST SERPL-MCNC: 213 MG/DL
CHOLEST/HDLC SERPL: 3.3 RATIO
CO2 SERPL-SCNC: 23 MMOL/L
CREAT SERPL-MCNC: 0.59 MG/DL
CREAT SPEC-SCNC: 13 MG/DL
EOSINOPHIL # BLD AUTO: 0.2 K/UL
EOSINOPHIL NFR BLD AUTO: 2.7 %
FOLATE SERPL-MCNC: 17 NG/ML
GLUCOSE SERPL-MCNC: 80 MG/DL
HBA1C MFR BLD HPLC: 8.1 %
HCT VFR BLD CALC: 42.6 %
HDLC SERPL-MCNC: 65 MG/DL
HGB BLD-MCNC: 12.5 G/DL
IMM GRANULOCYTES NFR BLD AUTO: 0.3 %
IRON SATN MFR SERPL: 8 %
IRON SERPL-MCNC: 33 UG/DL
LDLC SERPL CALC-MCNC: 120 MG/DL
LYMPHOCYTES # BLD AUTO: 1.14 K/UL
LYMPHOCYTES NFR BLD AUTO: 15.4 %
MAN DIFF?: NORMAL
MCHC RBC-ENTMCNC: 22 PG
MCHC RBC-ENTMCNC: 29.3 GM/DL
MCV RBC AUTO: 75 FL
MICROALBUMIN 24H UR DL<=1MG/L-MCNC: <1.2 MG/DL
MICROALBUMIN/CREAT 24H UR-RTO: NORMAL MG/G
MONOCYTES # BLD AUTO: 0.54 K/UL
MONOCYTES NFR BLD AUTO: 7.3 %
NEUTROPHILS # BLD AUTO: 5.5 K/UL
NEUTROPHILS NFR BLD AUTO: 74 %
PLATELET # BLD AUTO: 310 K/UL
POTASSIUM SERPL-SCNC: 5.2 MMOL/L
PROT SERPL-MCNC: 7.3 G/DL
RBC # BLD: 5.68 M/UL
RBC # FLD: 19.4 %
SODIUM SERPL-SCNC: 141 MMOL/L
T4 FREE SERPL-MCNC: 1.3 NG/DL
TIBC SERPL-MCNC: 408 UG/DL
TRIGL SERPL-MCNC: 141 MG/DL
TSH SERPL-ACNC: 1.62 UIU/ML
UIBC SERPL-MCNC: 375 UG/DL
VIT B12 SERPL-MCNC: 598 PG/ML
WBC # FLD AUTO: 7.42 K/UL

## 2019-04-11 ENCOUNTER — EMERGENCY (EMERGENCY)
Facility: HOSPITAL | Age: 69
LOS: 1 days | Discharge: HOME | End: 2019-04-11
Attending: EMERGENCY MEDICINE
Payer: MEDICARE

## 2019-04-11 ENCOUNTER — APPOINTMENT (OUTPATIENT)
Dept: THORACIC SURGERY | Facility: CLINIC | Age: 69
End: 2019-04-11
Payer: MEDICARE

## 2019-04-11 VITALS
OXYGEN SATURATION: 95 % | BODY MASS INDEX: 23.3 KG/M2 | DIASTOLIC BLOOD PRESSURE: 71 MMHG | TEMPERATURE: 98.6 F | HEIGHT: 66 IN | HEART RATE: 80 BPM | SYSTOLIC BLOOD PRESSURE: 163 MMHG | WEIGHT: 145 LBS

## 2019-04-11 VITALS
DIASTOLIC BLOOD PRESSURE: 71 MMHG | OXYGEN SATURATION: 99 % | RESPIRATION RATE: 16 BRPM | HEART RATE: 87 BPM | SYSTOLIC BLOOD PRESSURE: 197 MMHG | TEMPERATURE: 97 F

## 2019-04-11 DIAGNOSIS — Z98.89 OTHER SPECIFIED POSTPROCEDURAL STATES: Chronic | ICD-10-CM

## 2019-04-11 DIAGNOSIS — Z87.09 PERSONAL HISTORY OF OTHER DISEASES OF THE RESPIRATORY SYSTEM: Chronic | ICD-10-CM

## 2019-04-11 DIAGNOSIS — Z98.890 OTHER SPECIFIED POSTPROCEDURAL STATES: Chronic | ICD-10-CM

## 2019-04-11 DIAGNOSIS — H05.352 EXOSTOSIS OF LEFT ORBIT: Chronic | ICD-10-CM

## 2019-04-11 DIAGNOSIS — Z96.653 PRESENCE OF ARTIFICIAL KNEE JOINT, BILATERAL: Chronic | ICD-10-CM

## 2019-04-11 DIAGNOSIS — K62.5 HEMORRHAGE OF ANUS AND RECTUM: Chronic | ICD-10-CM

## 2019-04-11 LAB — 25(OH)D3 SERPL-MCNC: 28.8 NG/ML

## 2019-04-11 PROCEDURE — 71046 X-RAY EXAM CHEST 2 VIEWS: CPT | Mod: 26

## 2019-04-11 PROCEDURE — 70450 CT HEAD/BRAIN W/O DYE: CPT | Mod: 26

## 2019-04-11 PROCEDURE — 99214 OFFICE O/P EST MOD 30 MIN: CPT

## 2019-04-11 PROCEDURE — 93010 ELECTROCARDIOGRAM REPORT: CPT | Mod: GC

## 2019-04-11 PROCEDURE — 99284 EMERGENCY DEPT VISIT MOD MDM: CPT | Mod: GC,25

## 2019-04-11 RX ORDER — IPRATROPIUM/ALBUTEROL SULFATE 18-103MCG
3 AEROSOL WITH ADAPTER (GRAM) INHALATION ONCE
Qty: 0 | Refills: 0 | Status: COMPLETED | OUTPATIENT
Start: 2019-04-11 | End: 2019-04-11

## 2019-04-11 RX ORDER — LABETALOL HCL 100 MG
10 TABLET ORAL ONCE
Qty: 0 | Refills: 0 | Status: DISCONTINUED | OUTPATIENT
Start: 2019-04-11 | End: 2019-04-11

## 2019-04-11 RX ORDER — ACETAMINOPHEN 500 MG
650 TABLET ORAL ONCE
Qty: 0 | Refills: 0 | Status: COMPLETED | OUTPATIENT
Start: 2019-04-11 | End: 2019-04-11

## 2019-04-11 RX ADMIN — Medication 50 MILLIGRAM(S): at 23:22

## 2019-04-11 RX ADMIN — Medication 3 MILLILITER(S): at 23:20

## 2019-04-11 NOTE — ED PROVIDER NOTE - ATTENDING CONTRIBUTION TO CARE
Patient presenting for hypertension.  Reporting earlier this evening suddenly felt "off" at rest - checked her BP at home and noted it was elevated - so came to Emergency Department for evaluation.  Cannot quantify what specifically this sensation was.  History of BP in past and was on antihypertensives at one point but now off them for many years.  Denying chest pains.  Has been short of breath recently secondary to COPD, taking medications regularly, also started taking mucinex for cough (does not know which variety she is taking).  Reporting mild right sided headache and neck pains which she would not have normally been concerned about if it was not for her elevated BP.    A 14 point review of systems is negative except as in HPI or otherwise documented.    Exam:  General: Patient well appearing, hypertensive and hypoxic to mid 90's otherwise vital signs within normal limits  HEENT: airway patent with moist mucous membranes  Cardiac: RRR S1/S2 with strong peripheral pulses  Respiratory: lungs diffusely wheezy/coarse  GI: abdomen soft, non tender, non distended  Neuro: no gross neurologic deficits  Skin: warm, well perfused  Psych: normal mood and affect    Patient with history of COPD presenting with COPD exacerbation, also hypertension but normal neurologic exam.  Hypertension as noted possibly secondary to mucinex - unclear if it is a decongestant containing formulation?  Plan for screening workup for end organ damage, treat COPD in Emergency Department and re-eval.  Has cardiology for follow up.

## 2019-04-11 NOTE — ED PROVIDER NOTE - PSH
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  H/O pelvic surgery  5 years ago - s/p fracture  H/O total knee replacement, bilateral  5 years ago  History of partial hysterectomy  30 years ago - fibroids  History of sinus surgery  multiple sinus surgeries  History of tracheomalacia  2015 - attempted tracheal stenting ([facility])- course complicated by obstruction, respiratory failure, multiple CPR attempts -  stent discontinued; 10/20/2016 Tracheobronchoplasty (Prolene Mesh) performed at [facility] by Dr Zapien  Rectal bleeding  exam under anesthesia ([facility]) 2/2018  S/P bronchoscopy  6/5/2018 - [place] (Dr Zapien) no evidence of tracheobronchomalacia in trachea or bronchial tubes

## 2019-04-11 NOTE — ED PROVIDER NOTE - PROGRESS NOTE DETAILS
Teresa PGY2: CTH and labs wnl, pt improved s/p tylenol. will dc with PMD/pulm/neuro f/u and 4 days of steroids for COPD exacerbation. return precautions given for vision changes in R eye or persistent/worsening HA Teresa PGY2: daughter Zoe [phone]  [employer] [facility]:   [phone] (access code: [id number])  [phone] (access code: [id number])  both #s were called by  they closed at 5PM so pt will wait for SW in morning as can't afford ride home

## 2019-04-11 NOTE — ED ADULT NURSE NOTE - OBJECTIVE STATEMENT
68 YOF A&Ox3 with pmh of A-fib, diabetes, asthma, leaky aortic valve, TIA brought in by EMS to ED for hypertension with headache. Patient stated headache began 2 hours ago and has no medical history of HTN. Upon assessment patient has unproductive cough, rhonchi on right upper lobe, clear on right lower lobes, left lobes. Heart sounds within normal limits. Patient noted to have patch on back of left arm, patient stated it is for her diabetes. Patient denies sob, chest pain, vomiting, diarrhea, blurry vision and dizziness. Port in place from previous history of colorectal cancer. Permanent colostomy in place producing brown stool. safety maintained.

## 2019-04-11 NOTE — ED ADULT NURSE REASSESSMENT NOTE - NS ED NURSE REASSESS COMMENT FT1
patient appears to be in no acute distress. vital signs stable. patient to be discharged. safety maintained.

## 2019-04-11 NOTE — ED PROVIDER NOTE - CLINICAL SUMMARY MEDICAL DECISION MAKING FREE TEXT BOX
69 yo F c PMH of DM, aortic insufficiency, COPD, TIAs, colorectal ca (s/p chemotx, radiation, surgery, s/p colostomy) p/w 2 hour hx of R neck and R outer ear pain and temporal region, with associated RUE arm "heaviness," HA. On exam, 185/87, VS otherwise wnl, non-focal neuro exam. w/u: ekg/labs/CTH. ddx: migraine/TIA/temporal arteritis/mastoiditis/dissection/ACS. tx: pt allergic to ASA, states tylenol makes her jittery. will reassess.

## 2019-04-11 NOTE — ED PROVIDER NOTE - NSFOLLOWUPINSTRUCTIONS_ED_ALL_ED_FT
1. You were seen for headache. A copy of your resulted labs, imaging, and findings have been provided to you.  2.  prednisone from your pharmacy and take the medication as prescribed on the bottle's manufacterer's label, and consult a pharmacist or your primary care doctor with any questions.   3. Follow up with a neurologist and your pulmonologist and your primary care doctor within 48 hours. Please call [phone] to make an appointment or with any questions you may have.  4. Return immediately to the emergency department for new, persistent, or worsening symptoms or signs. Return immediately to the emergency department if you have chest pain, shortness of breath, loss of consciousness, fever, worsening HA, or vision changes.

## 2019-04-11 NOTE — ED PROVIDER NOTE - OBJECTIVE STATEMENT
69 yo F c PMH of DM, aortic insufficiency, COPD, TIAs, colorectal ca (s/p chemotx, radiation, surgery, s/p colostomy) p/w 2 hour hx of R neck and R outer ear pain and temporal region, with associated RUE arm "heaviness," REID. also had the pain yesterday and took tylenol yesterday, no hx of sx before that. remote h/o sinus surgery for Proteus infection. no slurred speech or focal weakness. PSH of L eye prosthesis.

## 2019-04-11 NOTE — PHYSICAL EXAM
[Sclera] : the sclera and conjunctiva were normal [PERRL With Normal Accommodation] : pupils were equal in size, round, and reactive to light [Neck Appearance] : the appearance of the neck was normal [Extraocular Movements] : extraocular movements were intact [] : no respiratory distress [Neck Cervical Mass (___cm)] : no neck mass was observed [Respiration, Rhythm And Depth] : normal respiratory rhythm and effort [Exaggerated Use Of Accessory Muscles For Inspiration] : no accessory muscle use [Apical Impulse] : the apical impulse was normal [Examination Of The Chest] : the chest was normal in appearance [Abdomen Soft] : soft [2+] : left 2+ [No CVA Tenderness] : no ~M costovertebral angle tenderness [Abdomen Tenderness] : non-tender [Oriented To Time, Place, And Person] : oriented to person, place, and time

## 2019-04-11 NOTE — ED PROVIDER NOTE - PHYSICAL EXAMINATION
NEURO: pupils 3 mm, PERRL, EOMI (CN III, IV, VI), facial sensation intact to light touch in all 3 divisions bilat (CN V), face is symmetric with normal eye closure, eye opening, and smile (CN VII), hearing is normal to rubbing fingers (CN VII), palate elevates symmetrically, phonation is normal (CN IX, X),  shoulder shrug intact bilat (CN XI), tongue is midline with nl movements and no atrophy (CN XII), finger to nose test nl bilat, negative pronator drift bilat,  speech is clear; 5/5 motor strength BUE and BLE: deltoids, biceps, triceps, wrist flexors/extensors, hand , hip flexors, knee flexors/extensors, plantar/dorsiflexors, hallux flexors/extensors; sensation intact to light touch BUE and BLE: C5-T1 and L3-S1; gait wnl  TMs clear with nl light reflex bl  R ear: ttp over R mastoid process with no erythema or swelling no protrusion of R ear  VA 20/20 OS

## 2019-04-11 NOTE — ED PROVIDER NOTE - NSFOLLOWUPCLINICS_GEN_ALL_ED_FT
[facility]  Neurology  [address] [place] [address] - [address] [place] [address]  Phone: [phone]  Fax:   Follow Up Time: 4-6 Days

## 2019-04-12 ENCOUNTER — APPOINTMENT (OUTPATIENT)
Dept: PULMONOLOGY | Facility: CLINIC | Age: 69
End: 2019-04-12

## 2019-04-12 VITALS
TEMPERATURE: 98 F | RESPIRATION RATE: 16 BRPM | OXYGEN SATURATION: 99 % | HEART RATE: 84 BPM | SYSTOLIC BLOOD PRESSURE: 119 MMHG | DIASTOLIC BLOOD PRESSURE: 73 MMHG

## 2019-04-12 LAB
ALBUMIN SERPL ELPH-MCNC: 4 G/DL — SIGNIFICANT CHANGE UP (ref 3.3–5)
ALP SERPL-CCNC: 90 U/L — SIGNIFICANT CHANGE UP (ref 40–120)
ALT FLD-CCNC: 13 U/L — SIGNIFICANT CHANGE UP (ref 10–45)
ANION GAP SERPL CALC-SCNC: 13 MMOL/L — SIGNIFICANT CHANGE UP (ref 5–17)
APTT BLD: 26.2 SEC — LOW (ref 27.5–36.3)
AST SERPL-CCNC: 12 U/L — SIGNIFICANT CHANGE UP (ref 10–40)
BASOPHILS # BLD AUTO: 0 K/UL — SIGNIFICANT CHANGE UP (ref 0–0.2)
BILIRUB SERPL-MCNC: 0.2 MG/DL — SIGNIFICANT CHANGE UP (ref 0.2–1.2)
BUN SERPL-MCNC: 8 MG/DL — SIGNIFICANT CHANGE UP (ref 7–23)
CALCIUM SERPL-MCNC: 8.9 MG/DL — SIGNIFICANT CHANGE UP (ref 8.4–10.5)
CHLORIDE SERPL-SCNC: 101 MMOL/L — SIGNIFICANT CHANGE UP (ref 96–108)
CO2 SERPL-SCNC: 25 MMOL/L — SIGNIFICANT CHANGE UP (ref 22–31)
CREAT SERPL-MCNC: 0.51 MG/DL — SIGNIFICANT CHANGE UP (ref 0.5–1.3)
CRP SERPL-MCNC: 0.7 MG/DL — HIGH (ref 0–0.4)
EOSINOPHIL # BLD AUTO: 0.2 K/UL — SIGNIFICANT CHANGE UP (ref 0–0.5)
EOSINOPHIL NFR BLD AUTO: 1 % — SIGNIFICANT CHANGE UP (ref 0–6)
GLUCOSE BLDC GLUCOMTR-MCNC: 220 MG/DL — HIGH (ref 70–99)
GLUCOSE BLDC GLUCOMTR-MCNC: 250 MG/DL — HIGH (ref 70–99)
GLUCOSE SERPL-MCNC: 97 MG/DL — SIGNIFICANT CHANGE UP (ref 70–99)
HCT VFR BLD CALC: 37.2 % — SIGNIFICANT CHANGE UP (ref 34.5–45)
HGB BLD-MCNC: 11.4 G/DL — LOW (ref 11.5–15.5)
INR BLD: 1.12 RATIO — SIGNIFICANT CHANGE UP (ref 0.88–1.16)
LYMPHOCYTES # BLD AUTO: 15 % — SIGNIFICANT CHANGE UP (ref 13–44)
LYMPHOCYTES # BLD AUTO: 2.5 K/UL — SIGNIFICANT CHANGE UP (ref 1–3.3)
MCHC RBC-ENTMCNC: 22.2 PG — LOW (ref 27–34)
MCHC RBC-ENTMCNC: 30.6 GM/DL — LOW (ref 32–36)
MCV RBC AUTO: 72.3 FL — LOW (ref 80–100)
MONOCYTES # BLD AUTO: 1.2 K/UL — HIGH (ref 0–0.9)
MONOCYTES NFR BLD AUTO: 7 % — SIGNIFICANT CHANGE UP (ref 2–14)
NEUTROPHILS # BLD AUTO: 10.5 K/UL — HIGH (ref 1.8–7.4)
NEUTROPHILS NFR BLD AUTO: 77 % — SIGNIFICANT CHANGE UP (ref 43–77)
PLATELET # BLD AUTO: 294 K/UL — SIGNIFICANT CHANGE UP (ref 150–400)
POTASSIUM SERPL-MCNC: 3.7 MMOL/L — SIGNIFICANT CHANGE UP (ref 3.5–5.3)
POTASSIUM SERPL-SCNC: 3.7 MMOL/L — SIGNIFICANT CHANGE UP (ref 3.5–5.3)
PROT SERPL-MCNC: 6.8 G/DL — SIGNIFICANT CHANGE UP (ref 6–8.3)
PROTHROM AB SERPL-ACNC: 12.8 SEC — SIGNIFICANT CHANGE UP (ref 10–12.9)
RBC # BLD: 5.14 M/UL — SIGNIFICANT CHANGE UP (ref 3.8–5.2)
RBC # FLD: 16.4 % — HIGH (ref 10.3–14.5)
SODIUM SERPL-SCNC: 139 MMOL/L — SIGNIFICANT CHANGE UP (ref 135–145)
WBC # BLD: 14.4 K/UL — HIGH (ref 3.8–10.5)
WBC # FLD AUTO: 14.4 K/UL — HIGH (ref 3.8–10.5)

## 2019-04-12 PROCEDURE — 85652 RBC SED RATE AUTOMATED: CPT

## 2019-04-12 PROCEDURE — 94640 AIRWAY INHALATION TREATMENT: CPT

## 2019-04-12 PROCEDURE — 93005 ELECTROCARDIOGRAM TRACING: CPT

## 2019-04-12 PROCEDURE — 96372 THER/PROPH/DIAG INJ SC/IM: CPT

## 2019-04-12 PROCEDURE — 85610 PROTHROMBIN TIME: CPT

## 2019-04-12 PROCEDURE — 85730 THROMBOPLASTIN TIME PARTIAL: CPT

## 2019-04-12 PROCEDURE — 70450 CT HEAD/BRAIN W/O DYE: CPT

## 2019-04-12 PROCEDURE — 99284 EMERGENCY DEPT VISIT MOD MDM: CPT | Mod: 25

## 2019-04-12 PROCEDURE — 86140 C-REACTIVE PROTEIN: CPT

## 2019-04-12 PROCEDURE — 85027 COMPLETE CBC AUTOMATED: CPT

## 2019-04-12 PROCEDURE — 71046 X-RAY EXAM CHEST 2 VIEWS: CPT

## 2019-04-12 PROCEDURE — 82962 GLUCOSE BLOOD TEST: CPT

## 2019-04-12 PROCEDURE — 80053 COMPREHEN METABOLIC PANEL: CPT

## 2019-04-12 RX ORDER — PANTOPRAZOLE SODIUM 20 MG/1
40 TABLET, DELAYED RELEASE ORAL ONCE
Qty: 0 | Refills: 0 | Status: COMPLETED | OUTPATIENT
Start: 2019-04-12 | End: 2019-04-12

## 2019-04-12 RX ORDER — DIGOXIN 250 MCG
0.25 TABLET ORAL ONCE
Qty: 0 | Refills: 0 | Status: COMPLETED | OUTPATIENT
Start: 2019-04-12 | End: 2019-04-12

## 2019-04-12 RX ORDER — INSULIN LISPRO 100/ML
5 VIAL (ML) SUBCUTANEOUS ONCE
Qty: 0 | Refills: 0 | Status: COMPLETED | OUTPATIENT
Start: 2019-04-12 | End: 2019-04-12

## 2019-04-12 RX ORDER — APIXABAN 2.5 MG/1
5 TABLET, FILM COATED ORAL ONCE
Qty: 0 | Refills: 0 | Status: COMPLETED | OUTPATIENT
Start: 2019-04-12 | End: 2019-04-12

## 2019-04-12 RX ADMIN — PANTOPRAZOLE SODIUM 40 MILLIGRAM(S): 20 TABLET, DELAYED RELEASE ORAL at 10:00

## 2019-04-12 RX ADMIN — APIXABAN 5 MILLIGRAM(S): 2.5 TABLET, FILM COATED ORAL at 10:00

## 2019-04-12 RX ADMIN — Medication 650 MILLIGRAM(S): at 00:44

## 2019-04-12 RX ADMIN — Medication 5 UNIT(S): at 09:59

## 2019-04-12 RX ADMIN — Medication 0.25 MILLIGRAM(S): at 10:02

## 2019-04-12 NOTE — ED ADULT NURSE REASSESSMENT NOTE - NS ED NURSE REASSESS COMMENT FT1
patient appears to be in no acute distress. vital signs are stable. patient awaiting x-ray results. safety maintained.

## 2019-04-12 NOTE — ED ADULT NURSE REASSESSMENT NOTE - NS ED NURSE REASSESS COMMENT FT1
patients daughter offered to get patient a ride home. Patient declined ride- states she wants to take her [employer]. Patient states she needs her home meds before leaving ED. Patient due for insulin, digoxin and protonix. Dr Luis aware and ordered medications. Patient also given paper scrubs, and breakfast. Patient signed her d/c papers earlier in the night with night shift RN. Patient to wait in triage area for room as she is d/c. Night RN de-accessed patients port.

## 2019-04-12 NOTE — ED ADULT NURSE REASSESSMENT NOTE - NS ED NURSE REASSESS COMMENT FT1
dr watts aware of patients FS- states no intervention at this time. S/w  regarding patients transport home as patient is still waiting for trip home.

## 2019-04-12 NOTE — ED ADULT NURSE REASSESSMENT NOTE - NS ED NURSE REASSESS COMMENT FT1
patient laying in bed a&ox3. States headache has subsided. Patient was d/c by night nurse lizzy, she is awaiting social work for ride home. Patient in no distress, BP within normal limits. Will continue to monitor.

## 2019-04-12 NOTE — CHART NOTE - NSCHARTNOTEFT_GEN_A_CORE
EMERGENCY ROOM SOCIAL WORK: LMSW notified of need for transport assist. Patient discharged 01:21 and awaiting SW intervention. LMSW met with patient at bedside, patient awake, alert and oriented x4. Patient reports daughter accompanied her to the ED on 4/11 and is unable to pick her up this AM. Patient reports all transport details for [employer] and [employer] were provided to MD last night. Patient requesting discharge via aforementioned transportation agencies. LMSW contacted [employer]-unable to accommodate same day trip. Patient informed and LMSW suggested to contacted daughter assist. Daughter, Zoe (ph. [phone]) contacted and  she took muscle relaxers last night and it is unsafe for her to drive. Daughter expressed willingness to pay for [employer] service from [facility] ED to patient's home. Patient refusing and states fare is too expensive for daughter. Patient requesting to contact [employer] for transport exploration. [facility] [employer] able to accommodate trip at  location [address] Patient and daughter informed. Arrangements made for daughter to pay for patient to be transported to [employer] pickup location via taxi.  confirmed for 12pm at [address] with [facility] [employer]. Patient reports she is insulin dependent and needs morning meds and has to change stoma pouch prior to leaving ED. RN requested medication orders and dispensed to patient. Patient changed stoma pouch independently. LMSW accompanied patient to "[employer] upon arrival.

## 2019-04-15 ENCOUNTER — APPOINTMENT (OUTPATIENT)
Dept: PULMONOLOGY | Facility: CLINIC | Age: 69
End: 2019-04-15

## 2019-04-15 LAB — UBIQUINONE10 SERPL-MCNC: 1.1 MG/L

## 2019-04-15 RX ORDER — VALSARTAN 40 MG/1
40 TABLET, COATED ORAL DAILY
Refills: 0 | Status: DISCONTINUED | COMMUNITY
End: 2019-04-15

## 2019-04-16 ENCOUNTER — NON-APPOINTMENT (OUTPATIENT)
Age: 69
End: 2019-04-16

## 2019-04-16 ENCOUNTER — APPOINTMENT (OUTPATIENT)
Dept: PULMONOLOGY | Facility: CLINIC | Age: 69
End: 2019-04-16
Payer: MEDICARE

## 2019-04-16 VITALS
HEART RATE: 78 BPM | SYSTOLIC BLOOD PRESSURE: 130 MMHG | HEIGHT: 66 IN | OXYGEN SATURATION: 99 % | DIASTOLIC BLOOD PRESSURE: 70 MMHG | BODY MASS INDEX: 23.63 KG/M2 | RESPIRATION RATE: 16 BRPM | WEIGHT: 147 LBS

## 2019-04-16 PROCEDURE — 95012 NITRIC OXIDE EXP GAS DETER: CPT

## 2019-04-16 PROCEDURE — 94010 BREATHING CAPACITY TEST: CPT

## 2019-04-16 PROCEDURE — 99214 OFFICE O/P EST MOD 30 MIN: CPT | Mod: 25

## 2019-04-16 RX ORDER — PREDNISONE 50 MG/1
50 TABLET ORAL
Qty: 4 | Refills: 0 | Status: DISCONTINUED | COMMUNITY
Start: 2019-04-12

## 2019-04-16 NOTE — ASSESSMENT
[FreeTextEntry1] : Ms. Bloom is a 67 year old female with a history of AVDz, asthma, allergy, GERD, TBM, s/p pneumonia, who presents now for a follow up s/p ER visit for high blood pressure/ initiated boosted steroids \par \par Her chronic SOB which is multifactorial due to:\par - tracheomalacia s/p tracheoplasty with residual frequent mucus production, though patient is non-compliant with vest therapy \par - asthmatic symptoms. \par - chronic bronchitis\par - asthma\par - allergies rhinitis\par - GERD\par - poor breathing mechanics \par - CAD/AV disease\par \par problem 2: asthmatic bronchitis\par - Reduce Medrol (currently at 40 mg) by 8 mg q3days to 4 mg then chronic dose. \par -continue to use albuterol via the nebulizer QID \par -followed by Mucomyst QiD\par -followed by the acapella device/ chest vest therapy \par -followed by Perforomist via the nebulizer BID\par -followed by Budesonide via the nebulizer BID \par -continue to use Breo Ellipta 200 at 1 inhalation QD \par -continue to use Spiriva 1 inhalation QD\par -continue to use Xolair 225 injection; follow up injections every 2 weeks\par -continue to use Accolate 20 mg BID\par -continue Medrol 4 mg to continue \par -Information sheet given about prednisone to the patient to be reviewed, this medication is never to be used without consulting the prescribing physician. Proper dietary restraint is necessary specifically salt containing foods, if any reaction may occur should be reported. \par -Asthma is believed to be caused by inherited (genetic) and environmental factor, but its exact cause is unknown. Asthma may be triggered by allergens, lung infections, or irritants in the air. Asthma triggers are different for each person\par -Inhaler technique reviewed as well as oral hygiene techniques reviewed with patient. Avoidance of cold air, extremes of temperature, rescue inhaler should be used before exercise. Order of medication reviewed with patient. Recommended use of a cool mist humidifier in the bedroom. \par \par problem 3: tracheomalacia, residual bronchomalacia \par -s/p tracheoplasty with Dr. [place] Zapien\par -continue to follow up \par \par problem 4: chronic bronchitis and mucus clearance\par -continue to use acapella device multiple times daily\par -recommended to use chest vest therapy multiple times daily \par -she is being sent for sputum cultures \par Patient has a chronic cough greater than 6 months, tried and failed manual chest physiotherapy at home, no skilled caregiver available at home to perform manual CPT, tried and failed acapella vibratory physiotherapy, and recommended chest vest therapy \par \par problem 5: GERD\par -continue to use Protonix 40 mg before breakfast\par -continue Baclofen 10 mg Qmeal/QHS\par -Rule of 2s: avoid eating too much, eating too late, eating too spicy, eating two hours before bed\par -Things to avoid including overeating, spicy foods, tight clothing, eating within three hours of bed, this list is not all inclusive. \par -For treatment of reflux, possible options discussed including diet control, H2 blockers, PPIs, as well as coating motility agents discussed as treatment options. Timing of meals and proximity of last meal to sleep were discussed. If symptoms persist, a formal gastrointestinal evaluation is needed. \par \par problem 6: allergic rhinitis \par -continue to use nasal saline\par -continue to use Xyzal 5 mg before bed\par -Environmental measures for allergies were encouraged including mattress and pillow cover, air purifier, and environmental controls. \par \par problem 7: hx of abnormal aortic valve / cardiac health\par -continue to follow up with Dr. Leahy, AV Disease, AF\par -echocadiogram in June 2018  (Tosin)\par \par problem 8: colon cancer\par -s/p radiation therapy, surgery \par -continue to use chemotherapy follow up with Dr. King or Dr. Mario\par \par problem 9: poor breathing mechanics\par -Proper breathing techniques were reviewed with an emphasis of exhalation. Patient instructed to breath in for 1 second and out for four seconds. Patient was encouraged to not talk while walking.\par \par problem 10: immunodeficiency\par - -Due to the fact that this pt has had more infections than would be expected and immunological blood work is indicated this would include: IgG subclasses, quantitative immunoglobulins, Strep pneumoniae titers as well as Vitamin D levels. Based on this blood work we will be able to decide where the pt needs additional pneumococcal vaccine either Prevnar 13 or pneumovax. Immunology evaluation will also be potentially indicated.\par \par problem 11: r/o immunodeficiency (on Medrol)\par -Due to the fact that this pt has had more infections than would be expected and immunological blood work is indicated this would include: IgG subclasses, quantitative immunoglobulins, Strep pneumoniae titers as well as Vitamin D levels.\par -Based on this blood work we will be able to decide where the pt needs additional pneumococcal vaccine either Prevnar 13 or pneumovax. Immunology evaluation will also be potentially indicated. \par \par problem 13: abnormal chest CT- ? new nodule- likely inflammation \par - F/u PET/CT 4/2019- if changed Bx (Rupali)\par -questionable DIEUDONNE or HERMELINDO, all sputum is negative \par \par Problem 14: Sensory Neuropathic cough \par - Continue  Amitriptyline 10 mg QHS for the first weeks then up to TID\par \par problem 15:  health maintenance \par -recommended yearly flu shot after October 15\par -recommended strep pneumonia vaccines: Prevnar-13 vaccine, followed by Pneumo vaccine 23 one year following\par -recommended early intervention for URIs\par -recommended regular osteoporosis evaluations\par -recommended early dermatological evaluations\par -recommended after the age of 50 to the age of 70, colonoscopy every 5 years \par \par \par F/U in 6 weeks - SPI / NIOX\par She is encouraged to call with any changes, concerns, or questions.

## 2019-04-16 NOTE — HISTORY OF PRESENT ILLNESS
[FreeTextEntry1] : Ms. Bloom is a 68 year old female with a history of abnormal CXR/chest CT, allergic rhinitis, severe persistent asthma, bronchiectasis, GERD, COPD, recurrent PNA, PND, s/p tracheoplasty, TBM, and SOB presenting to the office today for a follow up visit. Her chief complaint is cough\par - She was recently hospitalized for severely high blood pressure\par - She was referred to a neurologist for associated neck pain\par - She had been feeling SOB \par - She states that she has been keeping herself managed. \par - She states that she sometimes has dizziness, but it is hard to describe it because it does not last for an extended period of time\par - She feels very fatigued today, more than she has been. \par - She is going to have a PET/CT to determine whether a previously seen pulmonary nodule is malignant.\par - She continues to have neck and shoulder pain \par - She has been having urticaria on her chest  \par - Over the weekend she had a productive cough with purulent sputum. \par - She denies any headaches, nausea, vomiting, fever, chills, sweats, chest pain, chest pressure, palpitations, wheezing, diarrhea, constipation, dysphagia, myalgias, leg swelling, leg pain, itchy eyes, itchy ears, heartburn, reflux, or sour taste in the mouth.

## 2019-04-16 NOTE — REASON FOR VISIT
[Follow-Up] : a follow-up visit [FreeTextEntry1] : abnormal CXR/chest CT, allergic rhinitis, severe persistent asthma, bronchiectasis, GERD, COPD, recurrent PNA, PND, s/p tracheoplasty, TBM, and SOB

## 2019-04-16 NOTE — PROCEDURE
[FreeTextEntry1] : PFT- spi reveals mild restrictive and severe obstructive dysfunction; FEV1 was 0.88L which is 43% of predicted; normal flow volume loop \par \par FENO was 24; a normal value being less than 25\par Fractional exhaled nitric oxide (FENO) is regarded as a simple, noninvasive method for assessing eosinophilic airway inflammation. Produced by a variety of cells within the lung, nitric oxide (NO) concentrations are generally low in healthy individuals. However, high concentrations of NO appear to be involved in nonspecific host defense mechanisms and chronic inflammatory diseases such as asthma. The American Thoracic Society (ATS) therefore has recommended using FENO to aid in the diagnosis and monitoring of eosinophilic airway inflammation and asthma, and for identifying steroid responsive individuals whose chronic respiratory symptoms may be caused by airway inflammation. \par

## 2019-04-16 NOTE — ADDENDUM
[FreeTextEntry1] : Documented by Jigar Estrada acting as a scribe for Dr. Eulalio Allison on 4/16/2019\par \par All medical record entries made by the Scribe were at my, Dr. Eulalio Allison's, direction and personally dictated by me on 4/16/2019. I have reviewed the chart and agree that the record accurately reflects my personal performance of the history, physical exam, assessment and plan. I have also personally directed, reviewed, and agree with the discharge instructions. \par \par \par \par \par

## 2019-04-16 NOTE — PHYSICAL EXAM
[General Appearance - Well Developed] : well developed [Normal Appearance] : normal appearance [Well Groomed] : well groomed [General Appearance - Well Nourished] : well nourished [General Appearance - In No Acute Distress] : no acute distress [No Deformities] : no deformities [Eyelids - No Xanthelasma] : the eyelids demonstrated no xanthelasmas [Normal Conjunctiva] : the conjunctiva exhibited no abnormalities [Normal Oropharynx] : normal oropharynx [II] : II [Neck Appearance] : the appearance of the neck was normal [Jugular Venous Distention Increased] : there was no jugular-venous distention [Thyroid Diffuse Enlargement] : the thyroid was not enlarged [Neck Cervical Mass (___cm)] : no neck mass was observed [Thyroid Nodule] : there were no palpable thyroid nodules [Heart Rate And Rhythm] : heart rate and rhythm were normal [Heart Sounds] : normal S1 and S2 [Respiration, Rhythm And Depth] : normal respiratory rhythm and effort [Abdomen Soft] : soft [Exaggerated Use Of Accessory Muscles For Inspiration] : no accessory muscle use [Abdomen Tenderness] : non-tender [Abnormal Walk] : normal gait [Abdomen Mass (___ Cm)] : no abdominal mass palpated [Gait - Sufficient For Exercise Testing] : the gait was sufficient for exercise testing [Cyanosis, Localized] : no localized cyanosis [Nail Clubbing] : no clubbing of the fingernails [Petechial Hemorrhages (___cm)] : no petechial hemorrhages [Skin Color & Pigmentation] : normal skin color and pigmentation [] : no rash [No Skin Ulcers] : no skin ulcer [Skin Lesions] : no skin lesions [No Venous Stasis] : no venous stasis [No Xanthoma] : no  xanthoma was observed [Deep Tendon Reflexes (DTR)] : deep tendon reflexes were 2+ and symmetric [Sensation] : the sensory exam was normal to light touch and pinprick [No Focal Deficits] : no focal deficits [Oriented To Time, Place, And Person] : oriented to person, place, and time [Impaired Insight] : insight and judgment were intact [Affect] : the affect was normal [FreeTextEntry1] : 2/6 systolic murmur  [FreeTextEntry2] : Left foot wound

## 2019-04-17 ENCOUNTER — FORM ENCOUNTER (OUTPATIENT)
Age: 69
End: 2019-04-17

## 2019-04-18 ENCOUNTER — APPOINTMENT (OUTPATIENT)
Dept: NUCLEAR MEDICINE | Facility: IMAGING CENTER | Age: 69
End: 2019-04-18
Payer: MEDICARE

## 2019-04-18 ENCOUNTER — OUTPATIENT (OUTPATIENT)
Dept: OUTPATIENT SERVICES | Facility: HOSPITAL | Age: 69
LOS: 1 days | End: 2019-04-18
Payer: MEDICARE

## 2019-04-18 DIAGNOSIS — H05.352 EXOSTOSIS OF LEFT ORBIT: Chronic | ICD-10-CM

## 2019-04-18 DIAGNOSIS — Z98.890 OTHER SPECIFIED POSTPROCEDURAL STATES: Chronic | ICD-10-CM

## 2019-04-18 DIAGNOSIS — Z87.09 PERSONAL HISTORY OF OTHER DISEASES OF THE RESPIRATORY SYSTEM: Chronic | ICD-10-CM

## 2019-04-18 DIAGNOSIS — Z98.89 OTHER SPECIFIED POSTPROCEDURAL STATES: Chronic | ICD-10-CM

## 2019-04-18 DIAGNOSIS — K62.5 HEMORRHAGE OF ANUS AND RECTUM: Chronic | ICD-10-CM

## 2019-04-18 DIAGNOSIS — Z96.653 PRESENCE OF ARTIFICIAL KNEE JOINT, BILATERAL: Chronic | ICD-10-CM

## 2019-04-18 DIAGNOSIS — Z85.048 PERSONAL HISTORY OF OTHER MALIGNANT NEOPLASM OF RECTUM, RECTOSIGMOID JUNCTION, AND ANUS: ICD-10-CM

## 2019-04-18 PROCEDURE — 78815 PET IMAGE W/CT SKULL-THIGH: CPT | Mod: 26,PI

## 2019-04-18 PROCEDURE — 78815 PET IMAGE W/CT SKULL-THIGH: CPT

## 2019-04-18 PROCEDURE — A9552: CPT

## 2019-04-19 ENCOUNTER — APPOINTMENT (OUTPATIENT)
Dept: HEMATOLOGY ONCOLOGY | Facility: CLINIC | Age: 69
End: 2019-04-19
Payer: MEDICARE

## 2019-04-19 VITALS
HEART RATE: 77 BPM | DIASTOLIC BLOOD PRESSURE: 71 MMHG | SYSTOLIC BLOOD PRESSURE: 159 MMHG | TEMPERATURE: 98.8 F | RESPIRATION RATE: 14 BRPM | BODY MASS INDEX: 23.73 KG/M2 | WEIGHT: 146.98 LBS | OXYGEN SATURATION: 98 %

## 2019-04-19 PROCEDURE — 99215 OFFICE O/P EST HI 40 MIN: CPT

## 2019-04-19 RX ORDER — METRONIDAZOLE 500 MG/1
500 TABLET ORAL
Qty: 3 | Refills: 0 | Status: COMPLETED | COMMUNITY
Start: 2018-07-11 | End: 2019-02-19

## 2019-04-19 RX ORDER — SODIUM CHLORIDE FOR INHALATION 0.9 %
0.9 VIAL, NEBULIZER (ML) INHALATION
Qty: 3 | Refills: 1 | Status: DISCONTINUED | COMMUNITY
Start: 2017-10-26 | End: 2019-04-19

## 2019-04-19 RX ORDER — ONDANSETRON 8 MG/1
8 TABLET ORAL
Qty: 1 | Refills: 0 | Status: COMPLETED | COMMUNITY
Start: 2018-10-22 | End: 2019-04-19

## 2019-04-19 RX ORDER — AZELASTINE HYDROCHLORIDE 137 UG/1
0.1 SPRAY, METERED NASAL TWICE DAILY
Qty: 1 | Refills: 0 | Status: COMPLETED | COMMUNITY
Start: 2019-01-24 | End: 2019-04-19

## 2019-04-19 NOTE — HISTORY OF PRESENT ILLNESS
[de-identified] : Patient developed bleeding AUG 2016 from rectum. A colonoscopy in MAR 2016 was unrevealing. It was thought to be a hemorrhoid in the past. Her HGB began to drop. She was examined She called PCP and referred to GI surgeon (Dr. Magallanes). A mass was found and biopsy showed anal cancer, squamous cell. A PET scan did not reveal any local or metastatic disease. She received Mitomycin on 5/3/2017 (dose #2 mitomycin on 6/1/2017) and Xeloda. Radiation 5/3/17 to 6/16/17 at 5400 cGy. \par \par Patient also with h/o adrenal insufficiency after 50 years of steroids for asthma and tracheomalacia (mesh placed 10/2016, Dr. Mcclellan at [facility]). She has diabetes that requires insulin, without sequelae on the kidney or eye. 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 Will continue to monitor. \par Breast screening: She had breast mammogram and US in 2017 found 2 lesions, and both benign. Rt breast - "benign, nonproliferative fibrocystic changes"; Lt Breast - "fibroadenoma".  Due for repeat 2/2018.\par Cardiac: Will be undergoing SAFIA (Dr. Leahy) for re-evaluation of aortic valve, and bronchoscopy (Dr. Mcclellan). Her valve showed moderate to severe AR.\par \par 5/29/2018: Patient has local relapse in anus proved by biopsy in February 2018. It is again SCC and HPV / p16 is positive. PET 1/2018 showed no metastatic disease. Colonoscopy 2/2018 showed no colonic lesion, only anal mass that is palpable by ARIAS. Patient is deemed not a surgical candidate for APR due to pulmonary and cardiac risk. She completed second course of radiotherapy in early 4/2018 with Dr. Amanda Burger. She was recently hospitalized for high fever. No clear source - blood and urine cultures were negative. The CAT scan showed minor opacities that were not consistent with pnuemonia. She was admitted for 5 days and given IV antibiotics empirically. She now has weakness in legs and uses walker for balance. She will be getting a home rehabilitation visit soon. Does feel lightheaded at times.\par \par 4/19/2019:\par 1: Anal Cancer: Patient underwent APR surgery on 7/24/2018. Pathology post-operatively is ypT2N0; squamous cell cancer. She is back with her sister. Still requires daily wound care with topical and witch hazel. There was consideration of hyperbaric, but due to a concern about her lung disease in that she might not be able to tolerate hyperbaric. \par 2: Lung nodule: She had new lung nodule on right hemidiaphragm that is 1 cm and not FDG avid. She has a second 1 cm nodule on the RML that is subplueral and has mild FDG of 3.2 (PET 4/2019).  [de-identified] : Pulmonary: Eulalio Allison  [phone]\par GI surgeon: Terrell Luong; [phone]\par PCP: Anastasiya Jin [phone] \par Cardiologist: Steven Leahy [phone]\par Radiation Oncology: Amanda Burger and Allie Mart\par Colonoscopy: Rafael\par Wound: Huma Gray [phone]\par \par [facility] doctors: \par PCP/Cardiology: Jordi Engel\par Thoracic Surgeon: Zohaib Zapien

## 2019-04-19 NOTE — PHYSICAL EXAM
[Normal] : affect appropriate [Ambulatory and capable of all self care but unable to carry out any work activities] : Status 2- Ambulatory and capable of all self care but unable to carry out any work activities. Up and about more than 50% of waking hours [de-identified] : Left eye enucleation [Thin] : thin [de-identified] : mild wheeze; upper airway sounds transmitted.  [FreeTextEntry1] : Stoma is functioning well. [de-identified] : non-focal

## 2019-04-19 NOTE — REVIEW OF SYSTEMS
[Negative] : Allergic/Immunologic [FreeTextEntry2] : weight is down but is stable. Eating fairly well but poor appetite. [FreeTextEntry7] : surgical wound is open again [FreeTextEntry8] : burning [FreeTextEntry6] : chronic cough - notes recurrent discolored sputum that comes and goes [de-identified] : She walks with gait off balance. She also feels SOB when walks.

## 2019-04-19 NOTE — PHYSICAL EXAM
[Normal] : affect appropriate [Ambulatory and capable of all self care but unable to carry out any work activities] : Status 2- Ambulatory and capable of all self care but unable to carry out any work activities. Up and about more than 50% of waking hours [Thin] : thin [de-identified] : Left eye enucleation [FreeTextEntry1] : Stoma is functioning well. [de-identified] : mild wheeze; upper airway sounds transmitted.  [de-identified] : non-focal

## 2019-04-19 NOTE — REVIEW OF SYSTEMS
[Negative] : Allergic/Immunologic [FreeTextEntry2] : weight is down but is stable. Eating fairly well but poor appetite. [de-identified] : She walks with gait off balance. She also feels SOB when walks.  [FreeTextEntry6] : chronic cough - notes recurrent discolored sputum that comes and goes [FreeTextEntry8] : burning [FreeTextEntry7] : surgical wound is open again

## 2019-04-19 NOTE — HISTORY OF PRESENT ILLNESS
[de-identified] : Patient developed bleeding AUG 2016 from rectum. A colonoscopy in MAR 2016 was unrevealing. It was thought to be a hemorrhoid in the past. Her HGB began to drop. She was examined She called PCP and referred to GI surgeon (Dr. Magallanes). A mass was found and biopsy showed anal cancer, squamous cell. A PET scan did not reveal any local or metastatic disease. She received Mitomycin on 5/3/2017 (dose #2 mitomycin on 6/1/2017) and Xeloda. Radiation 5/3/17 to 6/16/17 at 5400 cGy. \par \par Patient also with h/o adrenal insufficiency after 50 years of steroids for asthma and tracheomalacia (mesh placed 10/2016, Dr. Mcclellan at [facility]). She has diabetes that requires insulin, without sequelae on the kidney or eye. 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 Will continue to monitor. \par Breast screening: She had breast mammogram and US in 2017 found 2 lesions, and both benign. Rt breast - "benign, nonproliferative fibrocystic changes"; Lt Breast - "fibroadenoma".  Due for repeat 2/2018.\par Cardiac: Will be undergoing SAFIA (Dr. Leahy) for re-evaluation of aortic valve, and bronchoscopy (Dr. Mcclellan). Her valve showed moderate to severe AR.\par \par 5/29/2018: Patient has local relapse in anus proved by biopsy in February 2018. It is again SCC and HPV / p16 is positive. PET 1/2018 showed no metastatic disease. Colonoscopy 2/2018 showed no colonic lesion, only anal mass that is palpable by ARIAS. Patient is deemed not a surgical candidate for APR due to pulmonary and cardiac risk. She completed second course of radiotherapy in early 4/2018 with Dr. Amanda Burger. She was recently hospitalized for high fever. No clear source - blood and urine cultures were negative. The CAT scan showed minor opacities that were not consistent with pnuemonia. She was admitted for 5 days and given IV antibiotics empirically. She now has weakness in legs and uses walker for balance. She will be getting a home rehabilitation visit soon. Does feel lightheaded at times.\par \par 4/19/2019:\par 1: Anal Cancer: Patient underwent APR surgery on 7/24/2018. Pathology post-operatively is ypT2N0; squamous cell cancer. She is back with her sister. Still requires daily wound care with topical and witch hazel. There was consideration of hyperbaric, but due to a concern about her lung disease in that she might not be able to tolerate hyperbaric. \par 2: Lung nodule: She had new lung nodule on right hemidiaphragm that is 1 cm and not FDG avid. She has a second 1 cm nodule on the RML that is subplueral and has mild FDG of 3.2 (PET 4/2019).  [de-identified] : Pulmonary: Eulalio Allison  [phone]\par GI surgeon: Terrell Luong; [phone]\par PCP: Anastasiya Jin [phone] \par Cardiologist: Steven Leahy [phone]\par Radiation Oncology: Amanda Burger and Allie Mart\par Colonoscopy: Rafael\par Wound: Huma Gray [phone]\par \par [facility] doctors: \par PCP/Cardiology: Jordi Engel\par Thoracic Surgeon: Zohaib Zapien

## 2019-04-20 NOTE — ASSESSMENT
[FreeTextEntry1] : 66 y/o female with a PMH of stage III A, T2N1 squamous cell carcinoma of anus, s/p chemo and radiation, tracheobronchomalacia s/p Right VATS, robotic assisted tracheobronchoplasty with Prolene mesh on 10/25/16 who presents today for a follow up visit. \par \par From a thoracic standpoint, she is doing well. She is coughing less, but does report some intermittent shortness of breath and fatigue. 12/2018 CT reveals 10mm indeterminate nodular opacity alivia right hemidiaphragm. Recent CT chest reviewed, stable. Will obtain PET scan to further evaluate. Patient states that she last approximately 25 lbs since her surgery. Encouraged patient to begin keep track of her calories and increase caloric intake by 25% per day. Will follow-up here to discuss results of PET scan. \par \par I have reviewed the patient's medical records and diagnostic images at the time of this office visit and have made the following recommendations\par Plan:\par 1. Obtain PET CT\par 2. Return to office to discuss results and plan of care\par \par NOT DICTATED

## 2019-04-20 NOTE — ADDENDUM
[FreeTextEntry1] : Documented by Win Newberry acting as a scribe for Dr. Zohaib Zapien on 04/11/2019

## 2019-04-20 NOTE — HISTORY OF PRESENT ILLNESS
[FreeTextEntry1] : 69 y/o female with a PMH of stage III A, T2N1 squamous cell carcinoma of anus, s/p chemo and radiation, tracheobronchomalacia s/p Right VATS, robotic assisted tracheobronchoplasty with Prolene mesh on 10/25/16 who presents today for a follow up visit with review of recent CT chest/ abd/ pelvis. \par \par She underwent awake bronchoscopy with lavage on 9/19/17. The bronchoscopy revealed less than 50% collapse of the proximal and mid trachea on cough and deep expiration, and 70-80% collapse of the distal trachea and bilateral mainstem bronchi. The lavage culture was positive for Acinetobacter lwoffi. \par \par She was admitted to [facility] in late Dec 2017 for SOB. CXR and CT chest were done during hospitalization, with two stable exophytic pancreatic tail cysts seen. She also had colonoscopy done which demonstrated a 2 cm hard anal lesion. Pathology revealed gastric antral mucosa with mild reactive gastropathy.\par \par PET CT 1/5/18 revealed nonspecific FDG activity in the anal region. It also revealed mucoid impacted distal airways, small peripheral nodules and scattered peripheral right lower lobe tree-in-bud opacities (stable).\par MRI of the brain done on 1/17/18 showed no evidence of metastatic disease. \par \par She was seen by Dr. Terrell Luong and on 2/9/18 she underwent a biopsy of the anal lesion. Pathology showed recurrence of invasive, moderately to poorly differentiated squamous cell carcinoma. Dr. Luong determined she was not a surgical candidate and her oncologist Dr. Zach King determined she was not a candidate for systemic chemotherapy due to her cardiac and pulmonary issues and referred her to radiation oncology.\par \par She completed of 3000 cGy RT to anus on 4/18/18 with Dr. Amanda Burger, and tolerated the treatment well. \par She was last seen in our office on 3/15/18, the plan was for her to undergo RT. Once she completes the RT, will focus on management of her aortic valve disease. \par \par She had an echocardiogram done on 5/3/18, which revealed EF 63% (previously 70% in March 2017), mild aortic regurgitation. Findings revealed no significant change when compared to previous echocardiograms. \par \par CT chest/ abd/ pelvis completed on 12/13/18:\par - stable post-op changes s/p distal colectomy \par - improvement of RIGHT lower lobe with consolidation compared to prior examination with mild residual opacity which is favored to be inflammatory in etiology\par - indeterminate nodular opacity along the dome of the right hemidiaphragm measuring 10 mm.\par - no evidence of metastatic disease in abdomen or pelvis\par - no evidence of thoracic, abdominal, or pelvic lymphadenopathy\par \par CT chest completed on 03/21/19:\par -high attenuation foci as well as branching tubular opacities in the anterior segment of the right lower lobe are unchanged \par \par Patient doing well. Reports mild SOB on exertion.  Recently started pulmonary rehab, but discontinued due to foot infection. Denies hemoptysis, chest discomfort, fever/chills. Last hospitalized 2/2018 for sepsis secondary to perianal abscess at [facility]. \par

## 2019-04-20 NOTE — END OF VISIT
[FreeTextEntry3] : All medical record entries made by the Scribe were at my, Dr. Zapien's direction and personally dictated by me on 04/11/2019 . I have reviewed the chart and agree that the record accurately reflects my personal performance of the history, physical exam, assessment and plan. I have also personally directed, reviewed, and agreed with the chart.

## 2019-04-22 ENCOUNTER — APPOINTMENT (OUTPATIENT)
Dept: PULMONOLOGY | Facility: CLINIC | Age: 69
End: 2019-04-22
Payer: MEDICARE

## 2019-04-22 VITALS
OXYGEN SATURATION: 96 % | HEART RATE: 72 BPM | WEIGHT: 148.15 LBS | RESPIRATION RATE: 15 BRPM | DIASTOLIC BLOOD PRESSURE: 70 MMHG | HEIGHT: 66 IN | SYSTOLIC BLOOD PRESSURE: 142 MMHG | BODY MASS INDEX: 23.81 KG/M2

## 2019-04-22 PROCEDURE — 96372 THER/PROPH/DIAG INJ SC/IM: CPT

## 2019-04-22 RX ORDER — OMALIZUMAB 75 MG/.5ML
75 INJECTION, SOLUTION SUBCUTANEOUS
Qty: 0 | Refills: 0 | Status: COMPLETED | OUTPATIENT
Start: 2019-04-22

## 2019-04-22 RX ORDER — OMALIZUMAB 150 MG/ML
150 INJECTION, SOLUTION SUBCUTANEOUS
Qty: 0 | Refills: 0 | Status: COMPLETED | OUTPATIENT
Start: 2019-04-22

## 2019-04-22 RX ADMIN — OMALIZUMAB 150 MG/ML: 150 INJECTION, SOLUTION SUBCUTANEOUS at 00:00

## 2019-04-22 RX ADMIN — OMALIZUMAB 75 MG/0.5ML: 75 INJECTION, SOLUTION SUBCUTANEOUS at 00:00

## 2019-04-25 ENCOUNTER — APPOINTMENT (OUTPATIENT)
Dept: PULMONOLOGY | Facility: CLINIC | Age: 69
End: 2019-04-25
Payer: MEDICARE

## 2019-04-25 ENCOUNTER — APPOINTMENT (OUTPATIENT)
Dept: WOUND CARE | Facility: CLINIC | Age: 69
End: 2019-04-25

## 2019-04-25 ENCOUNTER — RX CHANGE (OUTPATIENT)
Age: 69
End: 2019-04-25

## 2019-04-25 VITALS
HEIGHT: 66 IN | HEART RATE: 79 BPM | BODY MASS INDEX: 23.78 KG/M2 | DIASTOLIC BLOOD PRESSURE: 69 MMHG | RESPIRATION RATE: 17 BRPM | SYSTOLIC BLOOD PRESSURE: 119 MMHG | OXYGEN SATURATION: 94 % | WEIGHT: 148 LBS

## 2019-04-25 DIAGNOSIS — M54.9 DORSALGIA, UNSPECIFIED: ICD-10-CM

## 2019-04-25 PROCEDURE — 71046 X-RAY EXAM CHEST 2 VIEWS: CPT

## 2019-04-25 PROCEDURE — 99214 OFFICE O/P EST MOD 30 MIN: CPT | Mod: 25

## 2019-04-25 RX ORDER — TIZANIDINE HYDROCHLORIDE 4 MG/1
4 CAPSULE ORAL
Qty: 90 | Refills: 1 | Status: DISCONTINUED | COMMUNITY
Start: 2019-04-25 | End: 2019-04-25

## 2019-04-28 ENCOUNTER — FORM ENCOUNTER (OUTPATIENT)
Age: 69
End: 2019-04-28

## 2019-04-29 ENCOUNTER — APPOINTMENT (OUTPATIENT)
Dept: ULTRASOUND IMAGING | Facility: IMAGING CENTER | Age: 69
End: 2019-04-29
Payer: MEDICARE

## 2019-04-29 ENCOUNTER — APPOINTMENT (OUTPATIENT)
Dept: MAMMOGRAPHY | Facility: IMAGING CENTER | Age: 69
End: 2019-04-29
Payer: MEDICARE

## 2019-04-29 ENCOUNTER — OUTPATIENT (OUTPATIENT)
Dept: OUTPATIENT SERVICES | Facility: HOSPITAL | Age: 69
LOS: 1 days | End: 2019-04-29
Payer: MEDICARE

## 2019-04-29 DIAGNOSIS — R92.2 INCONCLUSIVE MAMMOGRAM: ICD-10-CM

## 2019-04-29 DIAGNOSIS — Z96.653 PRESENCE OF ARTIFICIAL KNEE JOINT, BILATERAL: Chronic | ICD-10-CM

## 2019-04-29 DIAGNOSIS — Z98.890 OTHER SPECIFIED POSTPROCEDURAL STATES: Chronic | ICD-10-CM

## 2019-04-29 DIAGNOSIS — Z87.09 PERSONAL HISTORY OF OTHER DISEASES OF THE RESPIRATORY SYSTEM: Chronic | ICD-10-CM

## 2019-04-29 DIAGNOSIS — Z98.89 OTHER SPECIFIED POSTPROCEDURAL STATES: Chronic | ICD-10-CM

## 2019-04-29 DIAGNOSIS — Z00.00 ENCOUNTER FOR GENERAL ADULT MEDICAL EXAMINATION WITHOUT ABNORMAL FINDINGS: ICD-10-CM

## 2019-04-29 DIAGNOSIS — H05.352 EXOSTOSIS OF LEFT ORBIT: Chronic | ICD-10-CM

## 2019-04-29 DIAGNOSIS — K62.5 HEMORRHAGE OF ANUS AND RECTUM: Chronic | ICD-10-CM

## 2019-04-29 PROCEDURE — 76641 ULTRASOUND BREAST COMPLETE: CPT | Mod: 26,50

## 2019-04-29 PROCEDURE — G0279: CPT

## 2019-04-29 PROCEDURE — 77066 DX MAMMO INCL CAD BI: CPT

## 2019-04-29 PROCEDURE — 77066 DX MAMMO INCL CAD BI: CPT | Mod: 26

## 2019-04-29 PROCEDURE — G0279: CPT | Mod: 26

## 2019-04-29 PROCEDURE — 76641 ULTRASOUND BREAST COMPLETE: CPT

## 2019-05-02 ENCOUNTER — APPOINTMENT (OUTPATIENT)
Dept: THORACIC SURGERY | Facility: CLINIC | Age: 69
End: 2019-05-02
Payer: MEDICARE

## 2019-05-02 VITALS
HEART RATE: 71 BPM | DIASTOLIC BLOOD PRESSURE: 63 MMHG | OXYGEN SATURATION: 98 % | RESPIRATION RATE: 18 BRPM | SYSTOLIC BLOOD PRESSURE: 154 MMHG | TEMPERATURE: 98 F

## 2019-05-02 DIAGNOSIS — Z87.09 PERSONAL HISTORY OF OTHER DISEASES OF THE RESPIRATORY SYSTEM: ICD-10-CM

## 2019-05-02 PROCEDURE — 71046 X-RAY EXAM CHEST 2 VIEWS: CPT

## 2019-05-02 PROCEDURE — 99214 OFFICE O/P EST MOD 30 MIN: CPT | Mod: 25

## 2019-05-03 ENCOUNTER — APPOINTMENT (OUTPATIENT)
Dept: WOUND CARE | Facility: CLINIC | Age: 69
End: 2019-05-03
Payer: MEDICARE

## 2019-05-03 DIAGNOSIS — Z51.0 ENCOUNTER FOR ANTINEOPLASTIC RADIATION THERAPY: ICD-10-CM

## 2019-05-03 DIAGNOSIS — Z85.048 PERSONAL HISTORY OF OTHER MALIGNANT NEOPLASM OF RECTUM, RECTOSIGMOID JUNCTION, AND ANUS: ICD-10-CM

## 2019-05-03 DIAGNOSIS — L02.92 FURUNCLE, UNSPECIFIED: ICD-10-CM

## 2019-05-03 DIAGNOSIS — N75.0 CYST OF BARTHOLIN'S GLAND: ICD-10-CM

## 2019-05-03 DIAGNOSIS — Z82.69 FAMILY HISTORY OF OTHER DISEASES OF THE MUSCULOSKELETAL SYSTEM AND CONNECTIVE TISSUE: ICD-10-CM

## 2019-05-03 PROCEDURE — 99213 OFFICE O/P EST LOW 20 MIN: CPT

## 2019-05-06 ENCOUNTER — APPOINTMENT (OUTPATIENT)
Dept: PULMONOLOGY | Facility: CLINIC | Age: 69
End: 2019-05-06

## 2019-05-06 RX ADMIN — OMALIZUMAB 75 MG/0.5ML: 75 INJECTION, SOLUTION SUBCUTANEOUS at 00:00

## 2019-05-06 RX ADMIN — OMALIZUMAB 150 MG/ML: 150 INJECTION, SOLUTION SUBCUTANEOUS at 00:00

## 2019-05-10 ENCOUNTER — APPOINTMENT (OUTPATIENT)
Dept: PULMONOLOGY | Facility: CLINIC | Age: 69
End: 2019-05-10
Payer: MEDICARE

## 2019-05-10 VITALS
DIASTOLIC BLOOD PRESSURE: 72 MMHG | HEART RATE: 79 BPM | HEIGHT: 66 IN | SYSTOLIC BLOOD PRESSURE: 130 MMHG | OXYGEN SATURATION: 96 % | RESPIRATION RATE: 18 BRPM | WEIGHT: 147 LBS | BODY MASS INDEX: 23.63 KG/M2

## 2019-05-10 PROCEDURE — 96372 THER/PROPH/DIAG INJ SC/IM: CPT

## 2019-05-10 RX ORDER — OMALIZUMAB 150 MG/ML
150 INJECTION, SOLUTION SUBCUTANEOUS
Qty: 0 | Refills: 0 | Status: COMPLETED | OUTPATIENT
Start: 2019-05-10

## 2019-05-10 RX ORDER — OMALIZUMAB 75 MG/.5ML
75 INJECTION, SOLUTION SUBCUTANEOUS
Qty: 0 | Refills: 0 | Status: COMPLETED | OUTPATIENT
Start: 2019-05-10

## 2019-05-10 RX ADMIN — OMALIZUMAB 150 MG/ML: 150 INJECTION, SOLUTION SUBCUTANEOUS at 00:00

## 2019-05-10 RX ADMIN — OMALIZUMAB 75 MG/0.5ML: 75 INJECTION, SOLUTION SUBCUTANEOUS at 00:00

## 2019-05-10 NOTE — ADDENDUM
[FreeTextEntry1] : All medical record entries made by the Candiceibronaldo were at ZACKERY oneill RICHARD  direction and personally dictated by me on 05/02/2019 . I have reviewed the chart and agree that the record accurately reflects my personal performance of the history, physical exam, assessment and plan. I have also personally directed, reviewed, and agreed with the chart.

## 2019-05-10 NOTE — END OF VISIT
[FreeTextEntry3] : Documented by Monserrat Pablo acting as a scribe for STEVE VIZCARRA on 05/02/2019

## 2019-05-10 NOTE — HISTORY OF PRESENT ILLNESS
home
[FreeTextEntry1] : 69 y/o female with a PMH of stage III A, T2N1 squamous cell carcinoma of anus, s/p chemo and radiation, tracheobronchomalacia s/p Right VATS, robotic assisted tracheobronchoplasty with Prolene mesh on 10/25/16 who presents today for a follow up visit with review of recent CT chest/ abd/ pelvis. \par \par She underwent awake bronchoscopy with lavage on 9/19/17. The bronchoscopy revealed less than 50% collapse of the proximal and mid trachea on cough and deep expiration, and 70-80% collapse of the distal trachea and bilateral mainstem bronchi. The lavage culture was positive for Acinetobacter lwoffi. \par \par She was admitted to [facility] in late Dec 2017 for SOB. CXR and CT chest were done during hospitalization, with two stable exophytic pancreatic tail cysts seen. She also had colonoscopy done which demonstrated a 2 cm hard anal lesion. Pathology revealed gastric antral mucosa with mild reactive gastropathy.\par \par PET CT 1/5/18 revealed nonspecific FDG activity in the anal region. It also revealed mucoid impacted distal airways, small peripheral nodules and scattered peripheral right lower lobe tree-in-bud opacities (stable).\par MRI of the brain done on 1/17/18 showed no evidence of metastatic disease. \par \par She was seen by Dr. Terrell Luong and on 2/9/18 she underwent a biopsy of the anal lesion. Pathology showed recurrence of invasive, moderately to poorly differentiated squamous cell carcinoma. Dr. Luong determined she was not a surgical candidate and her oncologist Dr. Zach King determined she was not a candidate for systemic chemotherapy due to her cardiac and pulmonary issues and referred her to radiation oncology.\par \par She completed of 3000 cGy RT to anus on 4/18/18 with Dr. Amanda Burger, and tolerated the treatment well. \par She was last seen in our office on 3/15/18, the plan was for her to undergo RT. Once she completes the RT, will focus on management of her aortic valve disease. \par \par She had an echocardiogram done on 5/3/18, which revealed EF 63% (previously 70% in March 2017), mild aortic regurgitation. Findings revealed no significant change when compared to previous echocardiograms. \par \par CT chest/ abd/ pelvis completed on 12/13/18:\par - stable post-op changes s/p distal colectomy \par - improvement of RIGHT lower lobe with consolidation compared to prior examination with mild residual opacity which is favored to be inflammatory in etiology\par - indeterminate nodular opacity along the dome of the right hemidiaphragm measuring 10 mm.\par - no evidence of metastatic disease in abdomen or pelvis\par - no evidence of thoracic, abdominal, or pelvic lymphadenopathy\par \par CT chest completed on 03/21/19:\par -high attenuation foci as well as branching tubular opacities in the anterior segment of the right lower lobe are unchanged \par \par PET CT completed on 04/18/19:\par -indeterminate FDG0-avid Right middle lobe subpleural nodular opacity\par -non fdg-avid 1cm nodular density along the Right hemidiaphragm compared to prior PET/CT and increased in size as compared to prior \par \par CT chest completed on 04/24/19:\par -no consolidation\par -a 5mm nodule in the right lower lobe\par -bronchial wall thickening right worse than left lower lobe\par -centrilobular groundglass nodularity in the Right lower lobe\par -asymmetric reticulation identified subpleural right middle and anterior right lower lobe\par

## 2019-05-10 NOTE — ASSESSMENT
[FreeTextEntry1] : 69 y/o female with a PMH of stage III A, T2N1 squamous cell carcinoma of anus, s/p chemo and radiation, tracheobronchomalacia s/p Right VATS, robotic assisted tracheobronchoplasty with Prolene mesh on 10/25/16 who presents today for a follow up visit. \par \par She comes in today complaining over lower back pain which previously brought her to the ER. She is currently pending an MRI. \par \par I have placed her on Zithromax for 5 days, and will place her on another course of Zithromax before her CT scan on 6/21/2019. \par \par I have reviewed the patient's medical records and diagnostic images at the time of this office visit and have made the following recommendations\par Plan:\par 1. Prescription provided for 5 days of Zithromax.\par 2. Obtain chest CT on 6/21/2019 and start Zithromax 5 days prior\par 2. RTO after chest CT

## 2019-05-10 NOTE — REVIEW OF SYSTEMS
[Cough] : cough [Negative] : Gastrointestinal [FreeTextEntry9] : right back pain- scheduled for an MRI on Saturday

## 2019-05-10 NOTE — PHYSICAL EXAM
[] : no respiratory distress [Respiration, Rhythm And Depth] : normal respiratory rhythm and effort [Exaggerated Use Of Accessory Muscles For Inspiration] : no accessory muscle use [Apical Impulse] : the apical impulse was normal [Heart Rate And Rhythm] : heart rate was normal and rhythm regular [Heart Sounds] : normal S1 and S2 [Examination Of The Chest] : the chest was normal in appearance [2+] : left 2+ [Abnormal Walk] : normal gait [Skin Color & Pigmentation] : normal skin color and pigmentation [Oriented To Time, Place, And Person] : oriented to person, place, and time [FreeTextEntry1] : bilateral rhonchi

## 2019-05-16 ENCOUNTER — FORM ENCOUNTER (OUTPATIENT)
Age: 69
End: 2019-05-16

## 2019-05-17 ENCOUNTER — APPOINTMENT (OUTPATIENT)
Dept: MRI IMAGING | Facility: IMAGING CENTER | Age: 69
End: 2019-05-17

## 2019-05-17 ENCOUNTER — OUTPATIENT (OUTPATIENT)
Dept: OUTPATIENT SERVICES | Facility: HOSPITAL | Age: 69
LOS: 1 days | End: 2019-05-17
Payer: MEDICARE

## 2019-05-17 ENCOUNTER — APPOINTMENT (OUTPATIENT)
Dept: RADIOLOGY | Facility: IMAGING CENTER | Age: 69
End: 2019-05-17
Payer: MEDICARE

## 2019-05-17 DIAGNOSIS — Z98.890 OTHER SPECIFIED POSTPROCEDURAL STATES: Chronic | ICD-10-CM

## 2019-05-17 DIAGNOSIS — Z96.653 PRESENCE OF ARTIFICIAL KNEE JOINT, BILATERAL: Chronic | ICD-10-CM

## 2019-05-17 DIAGNOSIS — K62.5 HEMORRHAGE OF ANUS AND RECTUM: Chronic | ICD-10-CM

## 2019-05-17 DIAGNOSIS — J39.8 OTHER SPECIFIED DISEASES OF UPPER RESPIRATORY TRACT: ICD-10-CM

## 2019-05-17 DIAGNOSIS — M54.9 DORSALGIA, UNSPECIFIED: ICD-10-CM

## 2019-05-17 DIAGNOSIS — Z98.89 OTHER SPECIFIED POSTPROCEDURAL STATES: Chronic | ICD-10-CM

## 2019-05-17 DIAGNOSIS — Z87.09 PERSONAL HISTORY OF OTHER DISEASES OF THE RESPIRATORY SYSTEM: Chronic | ICD-10-CM

## 2019-05-17 DIAGNOSIS — H05.352 EXOSTOSIS OF LEFT ORBIT: Chronic | ICD-10-CM

## 2019-05-17 PROCEDURE — 71046 X-RAY EXAM CHEST 2 VIEWS: CPT

## 2019-05-17 PROCEDURE — 72157 MRI CHEST SPINE W/O & W/DYE: CPT

## 2019-05-17 PROCEDURE — A9585: CPT

## 2019-05-17 PROCEDURE — 72157 MRI CHEST SPINE W/O & W/DYE: CPT | Mod: 26

## 2019-05-17 PROCEDURE — 71046 X-RAY EXAM CHEST 2 VIEWS: CPT | Mod: 26

## 2019-05-22 ENCOUNTER — OUTPATIENT (OUTPATIENT)
Dept: OUTPATIENT SERVICES | Facility: HOSPITAL | Age: 69
LOS: 1 days | Discharge: HOME | End: 2019-05-22

## 2019-05-22 ENCOUNTER — OUTPATIENT (OUTPATIENT)
Dept: OUTPATIENT SERVICES | Facility: HOSPITAL | Age: 69
LOS: 1 days | End: 2019-05-22
Payer: MEDICARE

## 2019-05-22 ENCOUNTER — APPOINTMENT (OUTPATIENT)
Dept: MRI IMAGING | Facility: IMAGING CENTER | Age: 69
End: 2019-05-22
Payer: MEDICARE

## 2019-05-22 DIAGNOSIS — K62.5 HEMORRHAGE OF ANUS AND RECTUM: Chronic | ICD-10-CM

## 2019-05-22 DIAGNOSIS — Z98.89 OTHER SPECIFIED POSTPROCEDURAL STATES: Chronic | ICD-10-CM

## 2019-05-22 DIAGNOSIS — Z96.653 PRESENCE OF ARTIFICIAL KNEE JOINT, BILATERAL: Chronic | ICD-10-CM

## 2019-05-22 DIAGNOSIS — Z87.09 PERSONAL HISTORY OF OTHER DISEASES OF THE RESPIRATORY SYSTEM: Chronic | ICD-10-CM

## 2019-05-22 DIAGNOSIS — Z98.890 OTHER SPECIFIED POSTPROCEDURAL STATES: Chronic | ICD-10-CM

## 2019-05-22 DIAGNOSIS — H05.352 EXOSTOSIS OF LEFT ORBIT: Chronic | ICD-10-CM

## 2019-05-22 DIAGNOSIS — Z51.0 ENCOUNTER FOR ANTINEOPLASTIC RADIATION THERAPY: ICD-10-CM

## 2019-05-22 DIAGNOSIS — C18.9 MALIGNANT NEOPLASM OF COLON, UNSPECIFIED: ICD-10-CM

## 2019-05-22 PROCEDURE — 72197 MRI PELVIS W/O & W/DYE: CPT | Mod: 26

## 2019-05-22 PROCEDURE — 72197 MRI PELVIS W/O & W/DYE: CPT

## 2019-05-22 PROCEDURE — A9585: CPT

## 2019-05-24 ENCOUNTER — APPOINTMENT (OUTPATIENT)
Dept: HYPERBARIC MEDICINE | Facility: CLINIC | Age: 69
End: 2019-05-24
Payer: MEDICARE
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
## 2019-05-24 DIAGNOSIS — L59.8 OTHER SPECIFIED DISORDERS OF THE SKIN AND SUBCUTANEOUS TISSUE RELATED TO RADIATION: ICD-10-CM

## 2019-05-24 PROCEDURE — 99214 OFFICE O/P EST MOD 30 MIN: CPT

## 2019-05-28 ENCOUNTER — APPOINTMENT (OUTPATIENT)
Dept: PULMONOLOGY | Facility: CLINIC | Age: 69
End: 2019-05-28
Payer: MEDICARE

## 2019-05-28 VITALS
BODY MASS INDEX: 23.63 KG/M2 | DIASTOLIC BLOOD PRESSURE: 70 MMHG | HEART RATE: 76 BPM | OXYGEN SATURATION: 94 % | WEIGHT: 147 LBS | RESPIRATION RATE: 18 BRPM | SYSTOLIC BLOOD PRESSURE: 130 MMHG | HEIGHT: 66 IN

## 2019-05-28 PROCEDURE — 96372 THER/PROPH/DIAG INJ SC/IM: CPT

## 2019-05-28 RX ORDER — OMALIZUMAB 75 MG/.5ML
75 INJECTION, SOLUTION SUBCUTANEOUS
Qty: 0 | Refills: 0 | Status: COMPLETED | OUTPATIENT
Start: 2019-05-28

## 2019-05-28 RX ORDER — OMALIZUMAB 150 MG/ML
150 INJECTION, SOLUTION SUBCUTANEOUS
Qty: 0 | Refills: 0 | Status: COMPLETED | OUTPATIENT
Start: 2019-05-28

## 2019-05-28 RX ADMIN — OMALIZUMAB 75 MG/0.5ML: 75 INJECTION, SOLUTION SUBCUTANEOUS at 00:00

## 2019-05-28 RX ADMIN — OMALIZUMAB 150 MG/ML: 150 INJECTION, SOLUTION SUBCUTANEOUS at 00:00

## 2019-05-28 NOTE — PROCEDURE
[FreeTextEntry1] : PET CT (April 18, 2019) reveals indeterminate FDG-avid RML subpleural nodular opacity, new as compared to PET/CT dated 1/5/18. non FDG-avid 1 cm nodular density along right hemidiaphragm, new as compared to prior PET/CT, and increased in size as compared to CT dated 1/29/2019, also is indeterminate. s/p interval distal proctocolectomy with LLQ colostomy.\par \par CXR reveals a normal sized heart; no evidence of infiltrate or effusion--right sided port

## 2019-05-28 NOTE — ASSESSMENT
[FreeTextEntry1] : Ms. Bloom is a 67 year old female with a history of AVDz, asthma, allergy, GERD, TBM, s/p pneumonia, who presents now for a follow up s/p ER visit for high blood pressure/ initiated boosted steroids. S/p PET CT, following up with Dr. Zapien. \par \par Her chronic SOB which is multifactorial due to:\par - tracheomalacia s/p tracheoplasty with residual frequent mucus production, though patient is non-compliant with vest therapy \par - asthmatic symptoms. \par - chronic bronchitis\par - asthma\par - allergies rhinitis\par - GERD\par - poor breathing mechanics \par - CAD/AV disease\par \par problem 1: back pain - c/w musculoskeletal \par -Thoracic spine fracture\par -get MRI of T spine\par -add tizanidine \par -add Lidoderm patch \par \par problem 2: asthma -severe\par - continue Medrol 4 mg then chronic dose. \par -continue to use albuterol via the nebulizer QID \par -followed by Mucomyst QiD\par -followed by the acapella device/ chest vest therapy \par -followed by Perforomist via the nebulizer BID\par -followed by Budesonide via the nebulizer BID \par -continue to use Breo Ellipta 200 at 1 inhalation QD \par -continue to use Spiriva 1 inhalation QD\par -continue to use Xolair 225 injection; follow up injections every 2 weeks\par -continue to use Accolate 20 mg BID\par -continue Medrol 4 mg to continue \par -Information sheet given about prednisone to the patient to be reviewed, this medication is never to be used without consulting the prescribing physician. Proper dietary restraint is necessary specifically salt containing foods, if any reaction may occur should be reported. \par -Asthma is believed to be caused by inherited (genetic) and environmental factor, but its exact cause is unknown. Asthma may be triggered by allergens, lung infections, or irritants in the air. Asthma triggers are different for each person\par -Inhaler technique reviewed as well as oral hygiene techniques reviewed with patient. Avoidance of cold air, extremes of temperature, rescue inhaler should be used before exercise. Order of medication reviewed with patient. Recommended use of a cool mist humidifier in the bedroom. \par \par problem 3: tracheomalacia, residual bronchomalacia \par -s/p tracheoplasty with Dr. Mt Zapien\par -continue to follow up \par \par problem 4: chronic bronchitis and mucus clearance\par -continue to use acapella device multiple times daily\par -recommended to use chest vest therapy multiple times daily \par -she is being sent for sputum cultures \par Patient has a chronic cough greater than 6 months, tried and failed manual chest physiotherapy at home, no skilled caregiver available at home to perform manual CPT, tried and failed acapella vibratory physiotherapy, and recommended chest vest therapy \par \par problem 5: GERD\par -continue to use Protonix 40 mg before breakfast\par -continue Baclofen 10 mg Qmeal/QHS\par -Rule of 2s: avoid eating too much, eating too late, eating too spicy, eating two hours before bed\par -Things to avoid including overeating, spicy foods, tight clothing, eating within three hours of bed, this list is not all inclusive. \par -For treatment of reflux, possible options discussed including diet control, H2 blockers, PPIs, as well as coating motility agents discussed as treatment options. Timing of meals and proximity of last meal to sleep were discussed. If symptoms persist, a formal gastrointestinal evaluation is needed. \par \par problem 6: allergic rhinitis \par -continue to use nasal saline\par -continue to use Xyzal 5 mg before bed\par -Environmental measures for allergies were encouraged including mattress and pillow cover, air purifier, and environmental controls. \par \par problem 7: hx of abnormal aortic valve / cardiac health\par -continue to follow up with Dr. Leahy, AV Disease, AF\par -echocadiogram in June 2018  (Tosin)\par \par problem 8: colon cancer\par -s/p radiation therapy, surgery \par -continue to use chemotherapy follow up with Dr. King or Dr. Mario\par \par problem 9: poor breathing mechanics\par -Proper breathing techniques were reviewed with an emphasis of exhalation. Patient instructed to breath in for 1 second and out for four seconds. Patient was encouraged to not talk while walking.\par \par problem 10: immunodeficiency\par - -Due to the fact that this pt has had more infections than would be expected and immunological blood work is indicated this would include: IgG subclasses, quantitative immunoglobulins, Strep pneumoniae titers as well as Vitamin D levels. Based on this blood work we will be able to decide where the pt needs additional pneumococcal vaccine either Prevnar 13 or pneumovax. Immunology evaluation will also be potentially indicated.\par \par problem 11: r/o immunodeficiency (on Medrol)\par -Due to the fact that this pt has had more infections than would be expected and immunological blood work is indicated this would include: IgG subclasses, quantitative immunoglobulins, Strep pneumoniae titers as well as Vitamin D levels.\par -Based on this blood work we will be able to decide where the pt needs additional pneumococcal vaccine either Prevnar 13 or pneumovax. Immunology evaluation will also be potentially indicated. \par \par problem 13: abnormal chest CT- ? 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 \par \par problem 16:  health maintenance \par -recommended yearly flu shot after October 15\par -recommended strep pneumonia vaccines: Prevnar-13 vaccine, followed by Pneumo vaccine 23 one year following\par -recommended early intervention for URIs\par -recommended regular osteoporosis evaluations\par -recommended early dermatological evaluations\par -recommended after the age of 50 to the age of 70, colonoscopy every 5 years \par \par \par F/U in 6 weeks - SPI / NIOX\par She is encouraged to call with any changes, concerns, or questions.

## 2019-05-28 NOTE — HISTORY OF PRESENT ILLNESS
[FreeTextEntry1] : Ms. Bloom is a 68 year old female with a history of abnormal CXR/chest CT, allergic rhinitis, severe persistent asthma, bronchiectasis, GERD, COPD, recurrent PNA, PND, s/p tracheoplasty, TBM, and SOB presenting to the office today for a follow up visit. Her chief complaint is back pain. \par -she states that two nights ago, she was taken to the hospital for a severe stabbing like pain in her back. she reports sitting down and not moving when the pain first began\par -she will be following up with Dr. Zapien on Thursday\par -she adds that her back pain has been causing her to become SOB\par -she denies any headaches, nausea, vomiting, fever, chills, sweats, chest pain, chest pressure, diarrhea, constipation, dysphagia, dizziness, leg swelling, leg pain, itchy eyes, itchy ears, heartburn, reflux, or sour taste in the mouth.

## 2019-05-28 NOTE — PHYSICAL EXAM
[General Appearance - Well Developed] : well developed [Well Groomed] : well groomed [Normal Appearance] : normal appearance [General Appearance - Well Nourished] : well nourished [No Deformities] : no deformities [General Appearance - In No Acute Distress] : no acute distress [Normal Conjunctiva] : the conjunctiva exhibited no abnormalities [Normal Oropharynx] : normal oropharynx [Eyelids - No Xanthelasma] : the eyelids demonstrated no xanthelasmas [Neck Appearance] : the appearance of the neck was normal [II] : II [Neck Cervical Mass (___cm)] : no neck mass was observed [Jugular Venous Distention Increased] : there was no jugular-venous distention [Thyroid Diffuse Enlargement] : the thyroid was not enlarged [Thyroid Nodule] : there were no palpable thyroid nodules [Heart Sounds] : normal S1 and S2 [Heart Rate And Rhythm] : heart rate and rhythm were normal [Respiration, Rhythm And Depth] : normal respiratory rhythm and effort [Exaggerated Use Of Accessory Muscles For Inspiration] : no accessory muscle use [Abdomen Soft] : soft [Abdomen Tenderness] : non-tender [Abdomen Mass (___ Cm)] : no abdominal mass palpated [Abnormal Walk] : normal gait [Nail Clubbing] : no clubbing of the fingernails [Gait - Sufficient For Exercise Testing] : the gait was sufficient for exercise testing [Petechial Hemorrhages (___cm)] : no petechial hemorrhages [Cyanosis, Localized] : no localized cyanosis [Skin Color & Pigmentation] : normal skin color and pigmentation [] : no rash [No Venous Stasis] : no venous stasis [No Xanthoma] : no  xanthoma was observed [Skin Lesions] : no skin lesions [No Skin Ulcers] : no skin ulcer [Deep Tendon Reflexes (DTR)] : deep tendon reflexes were 2+ and symmetric [Sensation] : the sensory exam was normal to light touch and pinprick [Oriented To Time, Place, And Person] : oriented to person, place, and time [No Focal Deficits] : no focal deficits [Impaired Insight] : insight and judgment were intact [Affect] : the affect was normal [FreeTextEntry1] : ostomy in place  [FreeTextEntry2] : Left foot wound

## 2019-05-28 NOTE — ADDENDUM
[FreeTextEntry1] : Documented by Zachariah Moore acting as a scribe for Dr. Eulalio Allison on 04/25/2019 \par \par All medical record entries made by the Scribe were at my, Dr. Eulalio Allison's, direction and personally dictated by me on 04/25/2019  . I have reviewed the chart and agree that the record accurately reflects my personal performance of the history, physical exam, procedure, assessment and plan. I have also personally directed, reviewed, and agree with the discharge instructions. \par \par  \par \par \par \par

## 2019-05-31 ENCOUNTER — APPOINTMENT (OUTPATIENT)
Dept: WOUND CARE | Facility: CLINIC | Age: 69
End: 2019-05-31
Payer: MEDICARE

## 2019-05-31 DIAGNOSIS — Z92.3 PERSONAL HISTORY OF IRRADIATION: ICD-10-CM

## 2019-05-31 PROCEDURE — 99213 OFFICE O/P EST LOW 20 MIN: CPT

## 2019-05-31 NOTE — ASSESSMENT
[FreeTextEntry1] : 69 yo b woman with  with scar from apr surgery for  sq cell cancer  t2no with radiation,\par  s/p fistula in incision, on cipro\par asthmatic, s/p copd recurrence on medrol, with stoma, recent admit for pneumonia on \par currently  draining-on/off for anal scar alginate  \par great toe silvadene\par discussed use of hbo for radiated wounds, no pneumothorax history- review records \par will order mri r/o radionecrosis\par minimal bleeding and wound open- pocket with scar\par  complexity-moderate lab, xr, old record reviewed, test results-reviewed, \par no need for imaging currently, some discrpteancy between mr in summer and recent ct- \par may need repeat mri r/o soft tissue/ osteo necrosis in radiated field\par risk- high for surgery in radiated field- no surgical debridement at this time\par continue offloading\par follow up in 2 weeks

## 2019-05-31 NOTE — PHYSICAL EXAM
[Respiratory Effort] : normal respiratory effort [2+] : left 2+ [Normal Rate and Rhythm] : normal rate and rhythm [No Rash or Lesion] : No rash or lesion [No HSM] : no hepatosplenomegaly [Oriented to Person] : oriented to person [Oriented to Place] : oriented to place [Calm] : calm [JVD] : no jugular venous distention  [Ankle Swelling (On Exam)] : not present [Abdomen Tenderness] : ~T ~M No abdominal tenderness [de-identified] : nad [de-identified] : coughing [de-identified] : port right chest wall [de-identified] : stoma llq [de-identified] : urine ok- not incontinent [de-identified] : rom intact/ uses drive walker [FreeTextEntry1] : sacral cleft [FreeTextEntry2] : .2 [FreeTextEntry3] : .2 [FreeTextEntry4] : 1 [de-identified] : aquacel [FreeTextEntry7] : left great toe [FreeTextEntry8] : .5 [FreeTextEntry9] : .1 [de-identified] : .1 [de-identified] : medial nail removed

## 2019-05-31 NOTE — HISTORY OF PRESENT ILLNESS
[FreeTextEntry1] : 68 yr old had almost healing, no more bleeding, has new vna helping with aquacel for perineal wound\par  then had a bleeding episode, then had an abscess ,\par  toe nail removed by podiatrist for infection hasn’t grown back  ,\par  haveb a z pack before chest scan , has a cough and asthma\par going for spine eval s/p right t12 , right sided spine pain, nerve symptoms, just pain in back, on steroids chroniccally worried about copmpression frx\par bartholin cysts\par location- anal ; left great toe\par severity mild, no pain , no fever, VNA just finished\par last mri july, recurrent disease at anal verge; ct in march port, miguel lung opacities; dec ct llq ostomy pubic symphisi with screws, one in right sacroiliac joint, no bony lesions, no recurrent disease\par timing/duration stopped nov 16, started on nov 27; last discharge jan 11-19 and jan 29-feb5  copd exacerbation, pneumonia, intergluteal fistula sinus\par quality- blood output, the rest was leaking\par had radiation 20 in ten days and 6 weeks\par  asthma / medrol every day\par  had a vac in past\par

## 2019-06-03 NOTE — H&P ADULT - PROBLEM SELECTOR PLAN 7
-soft BPs   -will hold home Diovan 40mg in the setting of sepsis and resume as tolerated   -monitor blood pressures
No

## 2019-06-06 ENCOUNTER — RX RENEWAL (OUTPATIENT)
Age: 69
End: 2019-06-06

## 2019-06-11 ENCOUNTER — APPOINTMENT (OUTPATIENT)
Dept: PULMONOLOGY | Facility: CLINIC | Age: 69
End: 2019-06-11
Payer: MEDICARE

## 2019-06-11 ENCOUNTER — NON-APPOINTMENT (OUTPATIENT)
Age: 69
End: 2019-06-11

## 2019-06-11 VITALS
WEIGHT: 145 LBS | RESPIRATION RATE: 16 BRPM | HEART RATE: 75 BPM | BODY MASS INDEX: 24.16 KG/M2 | SYSTOLIC BLOOD PRESSURE: 124 MMHG | OXYGEN SATURATION: 96 % | DIASTOLIC BLOOD PRESSURE: 80 MMHG | HEIGHT: 65 IN

## 2019-06-11 DIAGNOSIS — T66.XXXA RADIATION SICKNESS, UNSPECIFIED, INITIAL ENCOUNTER: ICD-10-CM
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
## 2019-06-11 DIAGNOSIS — L59.8 OTHER SPECIFIED DISORDERS OF THE SKIN AND SUBCUTANEOUS TISSUE RELATED TO RADIATION: ICD-10-CM

## 2019-06-11 PROCEDURE — 95012 NITRIC OXIDE EXP GAS DETER: CPT

## 2019-06-11 PROCEDURE — 96372 THER/PROPH/DIAG INJ SC/IM: CPT

## 2019-06-11 PROCEDURE — 99214 OFFICE O/P EST MOD 30 MIN: CPT | Mod: 25

## 2019-06-11 PROCEDURE — 94010 BREATHING CAPACITY TEST: CPT

## 2019-06-11 RX ORDER — OMALIZUMAB 75 MG/.5ML
75 INJECTION, SOLUTION SUBCUTANEOUS
Qty: 0 | Refills: 0 | Status: COMPLETED | OUTPATIENT
Start: 2019-06-11

## 2019-06-11 RX ORDER — OMALIZUMAB 150 MG/ML
150 INJECTION, SOLUTION SUBCUTANEOUS
Qty: 0 | Refills: 0 | Status: COMPLETED | OUTPATIENT
Start: 2019-06-11

## 2019-06-11 RX ADMIN — OMALIZUMAB 150 MG/ML: 150 INJECTION, SOLUTION SUBCUTANEOUS at 00:00

## 2019-06-11 RX ADMIN — OMALIZUMAB 75 MG/0.5ML: 75 INJECTION, SOLUTION SUBCUTANEOUS at 00:00

## 2019-06-13 PROBLEM — L59.8 RADIATION NECROSIS OF SKIN AND SUBCUTANEOUS: Status: RESOLVED | Noted: 2019-05-31 | Resolved: 2019-06-13

## 2019-06-13 PROBLEM — T66.XXXA: Status: ACTIVE | Noted: 2019-06-13

## 2019-06-13 NOTE — PHYSICAL EXAM
[General Appearance - Well Developed] : well developed [Normal Appearance] : normal appearance [Well Groomed] : well groomed [General Appearance - Well Nourished] : well nourished [No Deformities] : no deformities [General Appearance - In No Acute Distress] : no acute distress [Normal Conjunctiva] : the conjunctiva exhibited no abnormalities [Eyelids - No Xanthelasma] : the eyelids demonstrated no xanthelasmas [Normal Oropharynx] : normal oropharynx [II] : II [Neck Appearance] : the appearance of the neck was normal [Jugular Venous Distention Increased] : there was no jugular-venous distention [Thyroid Diffuse Enlargement] : the thyroid was not enlarged [Neck Cervical Mass (___cm)] : no neck mass was observed [Thyroid Nodule] : there were no palpable thyroid nodules [Heart Rate And Rhythm] : heart rate and rhythm were normal [Heart Sounds] : normal S1 and S2 [Respiration, Rhythm And Depth] : normal respiratory rhythm and effort [Exaggerated Use Of Accessory Muscles For Inspiration] : no accessory muscle use [Abdomen Soft] : soft [Abdomen Tenderness] : non-tender [Abdomen Mass (___ Cm)] : no abdominal mass palpated [Abnormal Walk] : normal gait [Nail Clubbing] : no clubbing of the fingernails [Cyanosis, Localized] : no localized cyanosis [Gait - Sufficient For Exercise Testing] : the gait was sufficient for exercise testing [Petechial Hemorrhages (___cm)] : no petechial hemorrhages [Skin Color & Pigmentation] : normal skin color and pigmentation [] : no rash [No Venous Stasis] : no venous stasis [Skin Lesions] : no skin lesions [No Xanthoma] : no  xanthoma was observed [No Skin Ulcers] : no skin ulcer [Deep Tendon Reflexes (DTR)] : deep tendon reflexes were 2+ and symmetric [Sensation] : the sensory exam was normal to light touch and pinprick [No Focal Deficits] : no focal deficits [Oriented To Time, Place, And Person] : oriented to person, place, and time [Impaired Insight] : insight and judgment were intact [Affect] : the affect was normal [Auscultation Breath Sounds / Voice Sounds] : lungs were clear to auscultation bilaterally [FreeTextEntry1] : ostomy in place  [FreeTextEntry2] : Left foot wound

## 2019-06-13 NOTE — ASSESSMENT
[FreeTextEntry1] : Ms. Bloom is a 68 year old female with a history of AVDz, asthma, allergy, GERD, TBM, s/p pneumonia, who presents now for a follow up s/p ER visit for high blood pressure/ initiated boosted steroids. S/p PET CT, following up with Dr. Zapien. \par \par Her chronic SOB which is multifactorial due to:\par - tracheomalacia s/p tracheoplasty with residual frequent mucus production, though patient is non-compliant with vest therapy \par - asthmatic symptoms. \par - chronic bronchitis\par - asthma\par - allergies rhinitis\par - GERD\par - poor breathing mechanics \par - CAD/AV disease\par \par \par problem 1: asthma -severe\par - continue Medrol 4 mg then chronic dose. \par -continue to use albuterol via the nebulizer QID \par -followed by Mucomyst QiD\par -followed by the acapella device/ chest vest therapy \par -followed by Perforomist via the nebulizer BID\par -followed by Budesonide via the nebulizer BID \par -continue to use Breo Ellipta 200 at 1 inhalation QD \par -continue to use Spiriva 1 inhalation QD\par -continue to use Xolair 225 injection; follow up injections every 2 weeks\par -continue to use Accolate 20 mg BID\par -continue Medrol 4 mg to continue \par -Information sheet given about prednisone to the patient to be reviewed, this medication is never to be used without consulting the prescribing physician. Proper dietary restraint is necessary specifically salt containing foods, if any reaction may occur should be reported. \par -Asthma is believed to be caused by inherited (genetic) and environmental factor, but its exact cause is unknown. Asthma may be triggered by allergens, lung infections, or irritants in the air. Asthma triggers are different for each person\par -Inhaler technique reviewed as well as oral hygiene techniques reviewed with patient. Avoidance of cold air, extremes of temperature, rescue inhaler should be used before exercise. Order of medication reviewed with patient. Recommended use of a cool mist humidifier in the bedroom. \par \par problem 2: tracheomalacia, residual bronchomalacia \par -s/p tracheoplasty with Dr. Mt Zapien\par -continue to follow up \par \par problem 3: chronic bronchitis and mucus clearance\par -continue to use acapella device multiple times daily\par -recommended to use chest vest therapy multiple times daily \par -she is being sent for sputum cultures \par Patient has a chronic cough greater than 6 months, tried and failed manual chest physiotherapy at home, no skilled caregiver available at home to perform manual CPT, tried and failed acapella vibratory physiotherapy, and recommended chest vest therapy \par \par problem 4: GERD\par -continue to use Protonix 40 mg before breakfast\par -continue Baclofen 5 mg pre-meal and QHS\par -Rule of 2s: avoid eating too much, eating too late, eating too spicy, eating two hours before bed\par -Things to avoid including overeating, spicy foods, tight clothing, eating within three hours of bed, this list is not all inclusive. \par -For treatment of reflux, possible options discussed including diet control, H2 blockers, PPIs, as well as coating motility agents discussed as treatment options. Timing of meals and proximity of last meal to sleep were discussed. If symptoms persist, a formal gastrointestinal evaluation is needed. \par \par problem 5: allergic rhinitis \par -continue to use nasal saline\par -continue to use Xyzal 5 mg before bed\par -Environmental measures for allergies were encouraged including mattress and pillow cover, air purifier, and environmental controls. \par \par problem 6: hx of abnormal aortic valve / cardiac health\par -continue to follow up with Dr. Leahy, AV Disease, AF\par -echocadiogram in June 2018  (Tosin)\par \par problem 7: colon cancer\par -s/p radiation therapy, surgery \par -continue to use chemotherapy follow up with Dr. King or Dr. Mario\par \par problem 8: poor breathing mechanics\par -Proper breathing techniques were reviewed with an emphasis of exhalation. Patient instructed to breath in for 1 second and out for four seconds. Patient was encouraged to not talk while walking.\par \par problem 9: immunodeficiency\par - -Due to the fact that this pt has had more infections than would be expected and immunological blood work is indicated this would include: IgG subclasses, quantitative immunoglobulins, Strep pneumoniae titers as well as Vitamin D levels. Based on this blood work we will be able to decide where the pt needs additional pneumococcal vaccine either Prevnar 13 or pneumovax. Immunology evaluation will also be potentially indicated.\par \par problem 10: r/o immunodeficiency (on Medrol)\par -Due to the fact that this pt has had more infections than would be expected and immunological blood work is indicated this would include: IgG subclasses, quantitative immunoglobulins, Strep pneumoniae titers as well as Vitamin D levels.\par -Based on this blood work we will be able to decide where the pt needs additional pneumococcal vaccine either Prevnar 13 or pneumovax. Immunology evaluation will also be potentially indicated. \par \par problem 11: abnormal chest CT- ? new nodule- likely inflammation \par - F/u PET/CT 4/2019- if changed Bx (Rupali); follow up and re-evaluate 6/2019\par -questionable DIEUDONNE or HERMELINDO, all sputum is negative \par \par Problem 12: Sensory Neuropathic cough \par - Continue  Amitriptyline 10 mg QHS for the first weeks then up to TID\par \par problem 13:  health maintenance \par -recommended yearly flu shot after October 15\par -recommended strep pneumonia vaccines: Prevnar-13 vaccine, followed by Pneumo vaccine 23 one year following\par -recommended early intervention for URIs\par -recommended regular osteoporosis evaluations\par -recommended early dermatological evaluations\par -recommended after the age of 50 to the age of 70, colonoscopy every 5 years \par \par \par F/U in 6 weeks - SPI / NIOX\par She is encouraged to call with any changes, concerns, or questions.

## 2019-06-13 NOTE — HISTORY OF PRESENT ILLNESS
[FreeTextEntry1] : Ms. Bloom is a 68 year old female with a history of abnormal CXR/chest CT, allergic rhinitis, severe persistent asthma, bronchiectasis, GERD, COPD, recurrent PNA, PND, s/p tracheoplasty, TBM, and SOB presenting to the office today for a follow up visit. Her chief complaint is nasuea.\par -she reports feeling nauseated and lightheaded\par -she believes her breathing does not feel normal, with a chest tightness or discomfort\par -she reports having itchy eyes, and using refresh drops and using Singulair to alleviate her symptoms\par -she reports using her vest 2 times per day, and using the inhalers 3 times per day\par -she notes the mucus she is producing is mainly clear and unimpressive\par -she notes her blood sugar is controlled\par -she reports her back pain has improved, but her pain at her shoulder and neck is still active\par -she denies any chest pain, chest pressure, diarrhea, constipation, dysphagia, leg swelling, leg pain, itchy ears, heartburn, reflux, sour taste in the mouth.

## 2019-06-13 NOTE — ADDENDUM
[FreeTextEntry1] : Documented by Dejuan Cope acting as a scribe for Dr. Eulalio Allison on 06/11/2019.\par \par All medical record entries made by the Scribe were at my, Dr. Eulalio Allison's, direction and personally dictated by me on 06/11/2019. I have reviewed the chart and agree that the record accurately reflects my personal performance of the history, physical exam, assessment and plan. I have also personally directed, reviewed, and agree with the discharge instructions. \par \par  \par \par \par \par

## 2019-06-13 NOTE — PROCEDURE
[FreeTextEntry1] : PFT revealed moderate-severe obstructive dysfunction, with a FEV1 of 0.74L, which is 39% of predicted, with a normal flow volume loop \par \par FENO was 37; a normal value being less than 25\par Fractional exhaled nitric oxide (FENO) is regarded as a simple, noninvasive method for assessing eosinophilic airway inflammation. Produced by a variety of cells within the lung, nitric oxide (NO) concentrations are generally low in healthy individuals. However, high concentrations of NO appear to be involved in nonspecific host defense mechanisms and chronic inflammatory diseases such as asthma. The American Thor[employer] Society (ATS) therefore has recommended using FENO to aid in the diagnosis and monitoring of eosinophilic airway inflammation and asthma, and for identifying steroid responsive individuals whose chronic respiratory symptoms may be caused by airway inflammation.

## 2019-06-18 ENCOUNTER — APPOINTMENT (OUTPATIENT)
Dept: HEART AND VASCULAR | Facility: CLINIC | Age: 69
End: 2019-06-18
Payer: MEDICARE

## 2019-06-18 PROCEDURE — 99214 OFFICE O/P EST MOD 30 MIN: CPT

## 2019-06-18 PROCEDURE — 36415 COLL VENOUS BLD VENIPUNCTURE: CPT

## 2019-06-19 LAB
ALBUMIN SERPL ELPH-MCNC: 4.1 G/DL
ALP BLD-CCNC: 112 U/L
ALT SERPL-CCNC: 11 U/L
ANION GAP SERPL CALC-SCNC: 15 MMOL/L
AST SERPL-CCNC: 12 U/L
BASOPHILS # BLD AUTO: 0.01 K/UL
BASOPHILS NFR BLD AUTO: 0.1 %
BILIRUB SERPL-MCNC: 0.2 MG/DL
BUN SERPL-MCNC: 11 MG/DL
CALCIUM SERPL-MCNC: 9.3 MG/DL
CHLORIDE SERPL-SCNC: 103 MMOL/L
CO2 SERPL-SCNC: 23 MMOL/L
CREAT SERPL-MCNC: 0.66 MG/DL
EOSINOPHIL # BLD AUTO: 0.43 K/UL
EOSINOPHIL NFR BLD AUTO: 5 %
ERYTHROCYTE [SEDIMENTATION RATE] IN BLOOD BY WESTERGREN METHOD: 57 MM/HR
ESTIMATED AVERAGE GLUCOSE: 157 MG/DL
HBA1C MFR BLD HPLC: 7.1 %
HCT VFR BLD CALC: 42.2 %
HGB BLD-MCNC: 12.6 G/DL
IMM GRANULOCYTES NFR BLD AUTO: 0.2 %
LYMPHOCYTES # BLD AUTO: 1.68 K/UL
LYMPHOCYTES NFR BLD AUTO: 19.7 %
MAN DIFF?: NORMAL
MCHC RBC-ENTMCNC: 22.3 PG
MCHC RBC-ENTMCNC: 29.9 GM/DL
MCV RBC AUTO: 74.8 FL
MONOCYTES # BLD AUTO: 0.78 K/UL
MONOCYTES NFR BLD AUTO: 9.1 %
NEUTROPHILS # BLD AUTO: 5.61 K/UL
NEUTROPHILS NFR BLD AUTO: 65.9 %
PLATELET # BLD AUTO: 337 K/UL
POTASSIUM SERPL-SCNC: 4.4 MMOL/L
PROT SERPL-MCNC: 7 G/DL
RBC # BLD: 5.64 M/UL
RBC # FLD: 18.5 %
SODIUM SERPL-SCNC: 141 MMOL/L
WBC # FLD AUTO: 8.53 K/UL

## 2019-06-20 ENCOUNTER — FORM ENCOUNTER (OUTPATIENT)
Age: 69
End: 2019-06-20

## 2019-06-21 ENCOUNTER — APPOINTMENT (OUTPATIENT)
Dept: CT IMAGING | Facility: IMAGING CENTER | Age: 69
End: 2019-06-21
Payer: MEDICARE

## 2019-06-21 ENCOUNTER — OUTPATIENT (OUTPATIENT)
Dept: OUTPATIENT SERVICES | Facility: HOSPITAL | Age: 69
LOS: 1 days | End: 2019-06-21
Payer: MEDICARE

## 2019-06-21 DIAGNOSIS — Z98.890 OTHER SPECIFIED POSTPROCEDURAL STATES: ICD-10-CM

## 2019-06-21 DIAGNOSIS — J45.909 UNSPECIFIED ASTHMA, UNCOMPLICATED: ICD-10-CM

## 2019-06-21 DIAGNOSIS — Z87.09 PERSONAL HISTORY OF OTHER DISEASES OF THE RESPIRATORY SYSTEM: Chronic | ICD-10-CM

## 2019-06-21 DIAGNOSIS — J18.9 PNEUMONIA, UNSPECIFIED ORGANISM: ICD-10-CM

## 2019-06-21 DIAGNOSIS — Z98.890 OTHER SPECIFIED POSTPROCEDURAL STATES: Chronic | ICD-10-CM

## 2019-06-21 DIAGNOSIS — Z98.89 OTHER SPECIFIED POSTPROCEDURAL STATES: Chronic | ICD-10-CM

## 2019-06-21 DIAGNOSIS — Z96.653 PRESENCE OF ARTIFICIAL KNEE JOINT, BILATERAL: Chronic | ICD-10-CM

## 2019-06-21 DIAGNOSIS — K62.5 HEMORRHAGE OF ANUS AND RECTUM: Chronic | ICD-10-CM

## 2019-06-21 DIAGNOSIS — R93.89 ABNORMAL FINDINGS ON DIAGNOSTIC IMAGING OF OTHER SPECIFIED BODY STRUCTURES: ICD-10-CM

## 2019-06-21 DIAGNOSIS — H05.352 EXOSTOSIS OF LEFT ORBIT: Chronic | ICD-10-CM

## 2019-06-21 PROCEDURE — 71250 CT THORAX DX C-: CPT

## 2019-06-21 PROCEDURE — 71250 CT THORAX DX C-: CPT | Mod: 26

## 2019-06-23 NOTE — HISTORY OF PRESENT ILLNESS
[FreeTextEntry1] : She is awaiting insurance authorization  to start  hyperbaric oxygen therapy to promote chronic rectal abscess healing \par \par No clinical evidence of CHF.  denies dizziness, syncope, significant palpitations \par \par No fevers, chills, night sweats

## 2019-06-23 NOTE — PHYSICAL EXAM
[General Appearance - Well Developed] : well developed [Well Groomed] : well groomed [No Deformities] : no deformities [General Appearance - Well Nourished] : well nourished [General Appearance - In No Acute Distress] : no acute distress [Normal Conjunctiva] : the conjunctiva exhibited no abnormalities [Eyelids - No Xanthelasma] : the eyelids demonstrated no xanthelasmas [Normal Oral Mucosa] : normal oral mucosa [No Oral Pallor] : no oral pallor [No Oral Cyanosis] : no oral cyanosis [Normal Jugular Venous A Waves Present] : normal jugular venous A waves present [Normal Jugular Venous V Waves Present] : normal jugular venous V waves present [No Jugular Venous Espino A Waves] : no jugular venous espino A waves [Respiration, Rhythm And Depth] : normal respiratory rhythm and effort [Exaggerated Use Of Accessory Muscles For Inspiration] : no accessory muscle use [Auscultation Breath Sounds / Voice Sounds] : lungs were clear to auscultation bilaterally [Heart Rate And Rhythm] : heart rate and rhythm were normal [Heart Sounds] : normal S1 and S2 [Edema] : no peripheral edema present [Systolic grade ___/6] : A grade [unfilled]/6 systolic murmur was heard. [Bowel Sounds] : normal bowel sounds [Abdomen Tenderness] : non-tender [Abnormal Walk] : normal gait [Gait - Sufficient For Exercise Testing] : the gait was sufficient for exercise testing [Nail Clubbing] : no clubbing of the fingernails [Cyanosis, Localized] : no localized cyanosis [Petechial Hemorrhages (___cm)] : no petechial hemorrhages [Nail Splinter Hemorrhages] : no splinter hemorrhages of the nails [Fingers Osler's Nodes] : Osler's nodes were not seenon the fingers [Skin Color & Pigmentation] : normal skin color and pigmentation [] : no rash [No Venous Stasis] : no venous stasis [Skin Lesions] : no skin lesions [No Skin Ulcers] : no skin ulcer [No Xanthoma] : no  xanthoma was observed [Oriented To Time, Place, And Person] : oriented to person, place, and time [Affect] : the affect was normal [Impaired Insight] : insight and judgment were intact [Mood] : the mood was normal [Memory Recent] : recent memory was not impaired [Memory Remote] : remote memory was not impaired [FreeTextEntry1] : mildly decreased skin turgor

## 2019-06-23 NOTE — REASON FOR VISIT
[Follow-Up - Clinic] : a clinic follow-up of [Anticoagulation] : anticoagulation [Dyspnea] : dyspnea [Medication Management] : Medication management [FreeTextEntry1] : 69 yo female with hx of seizure ,  hx of rectal cancer s/p recent completion of radiation and chemotherapy and  + PET scan showing residual disease  which necessitated  surgery in July 2018 . Residual rectal cancer removed with  all 14 lymph nodes negative for disease. Ostomy bag in place. Brain MRI was negative for metastatic disease. \par \par Chronic asthma with respiratory infections and tracheomalacia that was surgically repaired last year . Has nonvalvular afib and is on Eliquis   without bleeding. \par \par There is  hx of moderate severity  chronic aortic regurgitation. \par \par

## 2019-06-23 NOTE — DISCUSSION/SUMMARY
[Paroxysmal Atrial Fibrillation] : paroxysmal atrial fibrillation [Stable] : stable [Compensated] : compensated [With Me] : with me [FreeTextEntry1] : venipuncture performed with Hgba1c, lipids, TSH, ESR, TSH\par \par Will assess timing of  event recorder   , may be a candidate for  afib/flutter ablation \par \par Has thoracic surgical follow up soon - will need clearance for  hyperbaric oxygen therapy to promote wound healing \par \par reinforced low glycemic diet and increased protein intake \par

## 2019-06-23 NOTE — ASSESSMENT
[FreeTextEntry1] : stable hemodynamics\par \par chronic, moderate aortic regurgitation \par \par type 2 diabetes \par \par PAF   on Eliquis

## 2019-06-25 ENCOUNTER — APPOINTMENT (OUTPATIENT)
Dept: PULMONOLOGY | Facility: CLINIC | Age: 69
End: 2019-06-25
Payer: MEDICARE

## 2019-06-25 PROCEDURE — 96372 THER/PROPH/DIAG INJ SC/IM: CPT

## 2019-06-25 RX ORDER — OMALIZUMAB 150 MG/ML
150 INJECTION, SOLUTION SUBCUTANEOUS
Qty: 0 | Refills: 0 | Status: COMPLETED | OUTPATIENT
Start: 2019-06-25

## 2019-06-25 RX ORDER — OMALIZUMAB 75 MG/.5ML
75 INJECTION, SOLUTION SUBCUTANEOUS
Qty: 0 | Refills: 0 | Status: COMPLETED | OUTPATIENT
Start: 2019-06-25

## 2019-06-25 RX ADMIN — OMALIZUMAB 150 MG/ML: 150 INJECTION, SOLUTION SUBCUTANEOUS at 00:00

## 2019-06-25 RX ADMIN — OMALIZUMAB 75 MG/0.5ML: 75 INJECTION, SOLUTION SUBCUTANEOUS at 00:00

## 2019-06-27 ENCOUNTER — APPOINTMENT (OUTPATIENT)
Dept: THORACIC SURGERY | Facility: CLINIC | Age: 69
End: 2019-06-27
Payer: MEDICARE

## 2019-06-27 VITALS
DIASTOLIC BLOOD PRESSURE: 63 MMHG | HEART RATE: 80 BPM | RESPIRATION RATE: 18 BRPM | SYSTOLIC BLOOD PRESSURE: 134 MMHG | TEMPERATURE: 98.5 F | OXYGEN SATURATION: 97 % | HEIGHT: 65 IN | BODY MASS INDEX: 24.16 KG/M2 | WEIGHT: 145 LBS

## 2019-06-27 PROCEDURE — 99214 OFFICE O/P EST MOD 30 MIN: CPT

## 2019-06-27 NOTE — PHYSICAL EXAM
[Respiration, Rhythm And Depth] : normal respiratory rhythm and effort [] : no respiratory distress [Exaggerated Use Of Accessory Muscles For Inspiration] : no accessory muscle use [Heart Sounds] : normal S1 and S2 [Heart Rate And Rhythm] : heart rate was normal and rhythm regular [Auscultation Breath Sounds / Voice Sounds] : lungs were clear to auscultation bilaterally [Apical Impulse] : the apical impulse was normal [2+] : left 2+ [Examination Of The Chest] : the chest was normal in appearance [Oriented To Time, Place, And Person] : oriented to person, place, and time [Abnormal Walk] : normal gait

## 2019-06-27 NOTE — PHYSICAL EXAM
[Respiratory Effort] : normal respiratory effort [Normal Rate and Rhythm] : normal rate and rhythm [2+] : left 2+ [No HSM] : no hepatosplenomegaly [No Rash or Lesion] : No rash or lesion [Oriented to Person] : oriented to person [Oriented to Place] : oriented to place [Calm] : calm [Ankle Swelling (On Exam)] : not present [JVD] : no jugular venous distention  [de-identified] : nad [de-identified] : coughing [Abdomen Tenderness] : ~T ~M No abdominal tenderness [de-identified] : port right chest wall [de-identified] : stoma llq [de-identified] : urine ok- not incontinent [FreeTextEntry1] : sacral cleft [de-identified] : rom intact/ uses drive walker [FreeTextEntry2] : .3 cm [FreeTextEntry3] : .2 cm [FreeTextEntry4] : .2 cm [de-identified] : .2/.2/1\par \par moisture \par .2 depth in a tunnel  [FreeTextEntry7] : left great toe [FreeTextEntry8] : .5 [de-identified] : aquacel [de-identified] : .1 [FreeTextEntry9] : .1 [de-identified] : medial nail removed

## 2019-06-27 NOTE — ASSESSMENT
[FreeTextEntry1] : 67 yo b woman with  with scar from apr surgery for  sq cell cancer  t2no with radiation,\par  s/p fistula in incision, on cipro\par asthmatic, s/p copd recurrence on medrol, with stoma, recent admit for pneumonia on \par currently  draining-on/off for anal scar \par using alginate// great toe silvadene\par discussed use of hbo for radiated wounds, no pneumothorax history- review records \par will order mri r/o radionecrosis\par minimal bleeding and wound open- pocket with scar\par  complexity-moderate lab, xr, old record reviewed, test results-reviewed, \par no need for imaging currently, some discrpteancy between mr in summer and recent ct- \par may need repeat mri r/o soft tissue/ osteo necrosis in radiated field\par risk- high for surgery in radiated field- no surgical debridement at this time\par continue offloading\par follow up in 2 weeks

## 2019-06-28 ENCOUNTER — APPOINTMENT (OUTPATIENT)
Dept: NUCLEAR MEDICINE | Facility: IMAGING CENTER | Age: 69
End: 2019-06-28

## 2019-06-28 ENCOUNTER — OUTPATIENT (OUTPATIENT)
Dept: OUTPATIENT SERVICES | Facility: HOSPITAL | Age: 69
LOS: 1 days | Discharge: HOME | End: 2019-06-28

## 2019-06-28 ENCOUNTER — RESULT REVIEW (OUTPATIENT)
Age: 69
End: 2019-06-28

## 2019-06-28 DIAGNOSIS — K62.5 HEMORRHAGE OF ANUS AND RECTUM: Chronic | ICD-10-CM

## 2019-06-28 DIAGNOSIS — Z98.89 OTHER SPECIFIED POSTPROCEDURAL STATES: Chronic | ICD-10-CM

## 2019-06-28 DIAGNOSIS — H05.352 EXOSTOSIS OF LEFT ORBIT: Chronic | ICD-10-CM

## 2019-06-28 DIAGNOSIS — Z87.09 PERSONAL HISTORY OF OTHER DISEASES OF THE RESPIRATORY SYSTEM: Chronic | ICD-10-CM

## 2019-06-28 DIAGNOSIS — C18.9 MALIGNANT NEOPLASM OF COLON, UNSPECIFIED: ICD-10-CM

## 2019-06-28 DIAGNOSIS — Z98.890 OTHER SPECIFIED POSTPROCEDURAL STATES: Chronic | ICD-10-CM

## 2019-06-28 DIAGNOSIS — Z96.653 PRESENCE OF ARTIFICIAL KNEE JOINT, BILATERAL: Chronic | ICD-10-CM

## 2019-06-29 NOTE — REVIEW OF SYSTEMS
[SOB on Exertion] : shortness of breath during exertion [Arthralgias] : arthralgias [Negative] : Cardiovascular [FreeTextEntry9] : back pain

## 2019-06-29 NOTE — END OF VISIT
[FreeTextEntry3] : All medical record entries made by the Scribe were at my, Dr. Zapien's direction and personally dictated by me on 06/27/2019 . I have reviewed the chart and agree that the record accurately reflects my personal performance of the history, physical exam, assessment and plan. I have also personally directed, reviewed, and agreed with the chart.\par

## 2019-06-29 NOTE — ASSESSMENT
[FreeTextEntry1] : 69 y/o female with a PMH of stage III A, T2N1 squamous cell carcinoma of anus, s/p chemo and radiation, tracheobronchomalacia s/p Right VATS, robotic assisted tracheobronchoplasty with Prolene mesh on 10/25/16. \par \par Patient reports feeling well. She reports that her breathing has significantly improved since her surgery. She does admit to lower back pain, treated with Baclofen 4x/day. She also reports occasional shortness of breath. \par \par CT chest was reviewed demonstrates a 1.7 x 1.3cm nodule in the peripheral aspect of the anterior segment of the Right lower lobe, previously a patchy ill-defined opacity. \par \par Patient will go for a PET scan to assess for abnormal uptake and plan to undergo a CT-guided biopsy to assess for malignancy. Discussed with patient of possible surgical intervention if biopsy results returns positive for malignancy.  \par \par I have reviewed the patient's medical records and diagnostic images at the time of this office visit and have made the following recommendations\par Plan:\par 1. PET scan review and then schedule CT-guided Biopsy to further assess \par 2. RTO with results to discuss next plan of care.

## 2019-06-29 NOTE — HISTORY OF PRESENT ILLNESS
[FreeTextEntry1] : 69 y/o female with a PMH of stage III A, T2N1 squamous cell carcinoma of anus, s/p chemo and radiation, tracheobronchomalacia s/p Right VATS, robotic assisted tracheobronchoplasty with Prolene mesh on 10/25/16. She presents today with a CT chest completed on 06/21/19.  She is currently on Eliquis for Afib. \par \par Awake bronchoscopy with lavage on 9/19/17:\par -less than 50% collapse of the proximal and mid trachea on cough and deep expiration, and 70-80% collapse of the distal trachea and bilateral mainstem bronchi\par -lavage culture was positive for Acinetobacter lwoffi. \par \par Admission to [facility] in late Dec 2017 for SOB. CXR and CT chest were done during hospitalization, with two stable exophytic pancreatic tail cysts seen. She also had colonoscopy done which demonstrated a 2 cm hard anal lesion. Pathology revealed gastric antral mucosa with mild reactive gastropathy.\par \par PET CT 1/5/18 revealed nonspecific FDG activity in the anal region. It also revealed mucoid impacted distal airways, small peripheral nodules and scattered peripheral right lower lobe tree-in-bud opacities (stable).\par MRI of the brain done on 1/17/18 showed no evidence of metastatic disease. \par \par She was seen by Dr. Terrell Luong and on 2/9/18 she underwent a biopsy of the anal lesion. Pathology showed recurrence of invasive, moderately to poorly differentiated squamous cell carcinoma. Dr. Luong determined she was not a surgical candidate and her oncologist Dr. Zach King determined she was not a candidate for systemic chemotherapy due to her cardiac and pulmonary issues and referred her to radiation oncology.\par \par She completed of 3000 cGy RT to anus on 4/18/18 with Dr. Amanda Burger, and tolerated the treatment well. \par She was last seen in our office on 3/15/18, the plan was for her to undergo RT. Once she completes the RT, will focus on management of her aortic valve disease. \par \par Echocardiogram done on 5/3/18:\par -EF 63% (previously 70% in March 2017), mild aortic regurgitation\par \par CT chest/ abd/ pelvis completed on 12/13/18:\par - stable post-op changes s/p distal colectomy \par - improvement of RIGHT lower lobe with consolidation compared to prior examination with mild residual opacity which is favored to be inflammatory in etiology\par - indeterminate nodular opacity along the dome of the right hemidiaphragm measuring 10 mm.\par - no evidence of metastatic disease in abdomen or pelvis\par - no evidence of thoracic, abdominal, or pelvic lymphadenopathy\par \par CT chest completed on 03/21/19:\par -high attenuation foci as well as branching tubular opacities in the anterior segment of the right lower lobe are unchanged \par \par PET CT completed on 04/18/19:\par -indeterminate FDG-avid Right middle lobe subpleural nodular opacity\par -non fdg-avid 1cm nodular density along the Right hemidiaphragm compared to prior PET/CT and increased in size as compared to prior \par \par CT chest completed on 04/24/19:\par -no consolidation\par -a 5mm nodule in the right lower lobe\par -bronchial wall thickening right worse than left lower lobe\par -centrilobular groundglass nodularity in the Right lower lobe\par -asymmetric reticulation identified subpleural right middle and anterior right lower lobe\par \par Spirometry done on 06/11/19 showed FEV1 = 0.74, 39% of predicted value, FVC = 1.62, 65% of predicted value, PEF = 2.08, 42% of predicted value.\par  \par CT chest completed on 06/21/19:\par -1.7 x 1.3cm nodule in the peripheral aspect of the anterior segment of the Right lower lobe, previously a patchy ill-defined opacity\par -mild increase in pleural thickening in the lateral segment of the right middle lobe\par \par

## 2019-07-02 ENCOUNTER — FORM ENCOUNTER (OUTPATIENT)
Age: 69
End: 2019-07-02

## 2019-07-02 PROBLEM — L59.8 SOFT TISSUE RADIONECROSIS: Status: ACTIVE | Noted: 2019-07-02

## 2019-07-03 ENCOUNTER — APPOINTMENT (OUTPATIENT)
Dept: INFUSION THERAPY | Facility: HOSPITAL | Age: 69
End: 2019-07-03

## 2019-07-03 ENCOUNTER — OUTPATIENT (OUTPATIENT)
Dept: OUTPATIENT SERVICES | Facility: HOSPITAL | Age: 69
LOS: 1 days | End: 2019-07-03
Payer: MEDICARE

## 2019-07-03 ENCOUNTER — APPOINTMENT (OUTPATIENT)
Dept: NUCLEAR MEDICINE | Facility: IMAGING CENTER | Age: 69
End: 2019-07-03
Payer: MEDICARE

## 2019-07-03 ENCOUNTER — APPOINTMENT (OUTPATIENT)
Dept: NEUROLOGY | Facility: CLINIC | Age: 69
End: 2019-07-03

## 2019-07-03 DIAGNOSIS — Z98.89 OTHER SPECIFIED POSTPROCEDURAL STATES: Chronic | ICD-10-CM

## 2019-07-03 DIAGNOSIS — Z87.09 PERSONAL HISTORY OF OTHER DISEASES OF THE RESPIRATORY SYSTEM: Chronic | ICD-10-CM

## 2019-07-03 DIAGNOSIS — K62.5 HEMORRHAGE OF ANUS AND RECTUM: Chronic | ICD-10-CM

## 2019-07-03 DIAGNOSIS — Z96.653 PRESENCE OF ARTIFICIAL KNEE JOINT, BILATERAL: Chronic | ICD-10-CM

## 2019-07-03 DIAGNOSIS — Z98.890 OTHER SPECIFIED POSTPROCEDURAL STATES: Chronic | ICD-10-CM

## 2019-07-03 DIAGNOSIS — H05.352 EXOSTOSIS OF LEFT ORBIT: Chronic | ICD-10-CM

## 2019-07-03 DIAGNOSIS — R91.1 SOLITARY PULMONARY NODULE: ICD-10-CM

## 2019-07-03 PROBLEM — L59.8 RADIATION NECROSIS OF SKIN AND SUBCUTANEOUS: Status: ACTIVE | Noted: 2019-06-13

## 2019-07-03 PROCEDURE — A9552: CPT

## 2019-07-03 PROCEDURE — 78815 PET IMAGE W/CT SKULL-THIGH: CPT | Mod: 26,PI

## 2019-07-03 PROCEDURE — 78815 PET IMAGE W/CT SKULL-THIGH: CPT

## 2019-07-03 NOTE — HISTORY OF PRESENT ILLNESS
[FreeTextEntry1] : 67 yo female , , who is almost 1 yr s/p APR for SCCA of anus\par APR followed RT and CHEMO\par Recently developed a  perineal wound , following I&D of an abscess\par Recent MRI- no osteo of pelvis\par Recent colo rectal eval noted\par Seeks opinion re -HBO

## 2019-07-03 NOTE — HISTORY OF PRESENT ILLNESS
[FreeTextEntry1] : 69 yo female , , who is almost 1 yr s/p APR for SCCA of anus\par APR followed RT and CHEMO\par Recently developed a  perineal wound , following I&D of an abscess\par Recent MRI- no osteo of pelvis\par Recent colo rectal eval noted\par Seeks opinion re -HBO

## 2019-07-03 NOTE — PHYSICAL EXAM
[Respiratory Effort] : normal respiratory effort [Normal Rate and Rhythm] : normal rate and rhythm [2+] : left 2+ [No Rash or Lesion] : No rash or lesion [Calm] : calm [No HSM] : no hepatosplenomegaly [Alert] : alert [Oriented to Person] : oriented to person [Oriented to Time] : oriented to time [Oriented to Place] : oriented to place [Ankle Swelling (On Exam)] : not present [de-identified] : urine ok- not incontinent [de-identified] : port right chest wall [FreeTextEntry3] : .2 [FreeTextEntry2] : .2 [FreeTextEntry4] : 1 [FreeTextEntry9] : .1 [de-identified] : .2/.2/1  [FreeTextEntry7] : left great toe [FreeTextEntry8] : .5 [de-identified] : medial nail removed [de-identified] : .1 [Abdomen Tenderness] : ~T ~M No abdominal tenderness [JVD] : no jugular venous distention  [Abdominal Masses] : No abdominal masses [de-identified] : non labored, healed trocar wounds right chest [Tender] : nontender [de-identified] : Thin, NAD, well groomed AA female [de-identified] : timi stomal dermatitis [de-identified] :  stoma, healed lower midline wound\par  [de-identified] : S/P APR [de-identified] : ambulkatory, s/p bilat TKR [FreeTextEntry1] : Patient has a small, currently  non draining 1 cm superficial wound at anterior aspect of perineal incision\par \par No obvious RT dermatitis\par No palpable recurrent perineal tumor

## 2019-07-03 NOTE — ASSESSMENT
[FreeTextEntry1] : Is a candidate for HBO given non healing wound and h/o RT\par \par Patient strongly in favor of HBO\par Informed of time involved and non predictable outcome\par \par Will seek pulmonary clearance given prior h/o right thoracoscopy\par \par Status fully discussed , inc risk of PTX  with HBO

## 2019-07-03 NOTE — PHYSICAL EXAM
[Respiratory Effort] : normal respiratory effort [Normal Rate and Rhythm] : normal rate and rhythm [2+] : left 2+ [No Rash or Lesion] : No rash or lesion [Calm] : calm [No HSM] : no hepatosplenomegaly [Alert] : alert [Oriented to Person] : oriented to person [Oriented to Time] : oriented to time [Oriented to Place] : oriented to place [Ankle Swelling (On Exam)] : not present [de-identified] : port right chest wall [de-identified] : urine ok- not incontinent [FreeTextEntry2] : .2 [FreeTextEntry3] : .2 [FreeTextEntry4] : 1 [de-identified] : .2/.2/1  [FreeTextEntry7] : left great toe [FreeTextEntry8] : .5 [FreeTextEntry9] : .1 [de-identified] : medial nail removed [de-identified] : .1 [JVD] : no jugular venous distention  [Abdomen Tenderness] : ~T ~M No abdominal tenderness [Abdominal Masses] : No abdominal masses [de-identified] : Thin, NAD, well groomed AA female [de-identified] : non labored, healed trocar wounds right chest [Tender] : nontender [de-identified] : timi stomal dermatitis [de-identified] :  stoma, healed lower midline wound\par  [de-identified] : ambulkatory, s/p bilat TKR [de-identified] : S/P APR [FreeTextEntry1] : Patient has a small, currently  non draining 1 cm superficial wound at anterior aspect of perineal incision\par \par No obvious RT dermatitis\par No palpable recurrent perineal tumor

## 2019-07-03 NOTE — REASON FOR VISIT
[Follow-Up: _____] : a [unfilled] follow-up visit [Consultation] : a consultation visit [Other: _____] : [unfilled] [FreeTextEntry1] : evaluate for HBO

## 2019-07-10 NOTE — PROGRESS NOTE BEHAVIORAL HEALTH - NSBHATTESTSEENBY_PSY_A_CORE
audible swallows Delayed pharyngeal swallow Delayed pharyngeal swallow/Multiple swallows attending Psychiatrist without NP/Trainee Within functional limits

## 2019-07-11 ENCOUNTER — APPOINTMENT (OUTPATIENT)
Dept: PULMONOLOGY | Facility: CLINIC | Age: 69
End: 2019-07-11
Payer: MEDICARE

## 2019-07-11 ENCOUNTER — APPOINTMENT (OUTPATIENT)
Dept: HEART AND VASCULAR | Facility: CLINIC | Age: 69
End: 2019-07-11

## 2019-07-11 VITALS
DIASTOLIC BLOOD PRESSURE: 70 MMHG | BODY MASS INDEX: 24.54 KG/M2 | TEMPERATURE: 96 F | RESPIRATION RATE: 14 BRPM | HEART RATE: 84 BPM | SYSTOLIC BLOOD PRESSURE: 128 MMHG | WEIGHT: 147.27 LBS | HEIGHT: 65 IN

## 2019-07-11 PROCEDURE — 96372 THER/PROPH/DIAG INJ SC/IM: CPT

## 2019-07-11 RX ORDER — OMALIZUMAB 75 MG/.5ML
75 INJECTION, SOLUTION SUBCUTANEOUS
Qty: 0 | Refills: 0 | Status: COMPLETED | OUTPATIENT
Start: 2019-07-11

## 2019-07-11 RX ORDER — OMALIZUMAB 150 MG/ML
150 INJECTION, SOLUTION SUBCUTANEOUS
Qty: 0 | Refills: 0 | Status: COMPLETED | OUTPATIENT
Start: 2019-07-11

## 2019-07-11 RX ADMIN — OMALIZUMAB 150 MG/ML: 150 INJECTION, SOLUTION SUBCUTANEOUS at 00:00

## 2019-07-11 RX ADMIN — OMALIZUMAB 75 MG/0.5ML: 75 INJECTION, SOLUTION SUBCUTANEOUS at 00:00

## 2019-07-17 NOTE — ED PROVIDER NOTE - NS ED NOTE AC HIGH RISK COUNTRIES
No I have obtained patient's history, performed physical exam and formulated management plan.   Brandan Bravo

## 2019-07-18 ENCOUNTER — APPOINTMENT (OUTPATIENT)
Dept: THORACIC SURGERY | Facility: CLINIC | Age: 69
End: 2019-07-18
Payer: MEDICARE

## 2019-07-18 VITALS
OXYGEN SATURATION: 96 % | SYSTOLIC BLOOD PRESSURE: 155 MMHG | DIASTOLIC BLOOD PRESSURE: 67 MMHG | HEART RATE: 78 BPM | RESPIRATION RATE: 18 BRPM

## 2019-07-18 PROCEDURE — 99214 OFFICE O/P EST MOD 30 MIN: CPT

## 2019-07-18 NOTE — PHYSICAL EXAM
[] : no respiratory distress [Respiration, Rhythm And Depth] : normal respiratory rhythm and effort [Exaggerated Use Of Accessory Muscles For Inspiration] : no accessory muscle use [Auscultation Breath Sounds / Voice Sounds] : lungs were clear to auscultation bilaterally [Apical Impulse] : the apical impulse was normal [Heart Rate And Rhythm] : heart rate was normal and rhythm regular [Heart Sounds] : normal S1 and S2 [Examination Of The Chest] : the chest was normal in appearance [2+] : left 2+ [Oriented To Time, Place, And Person] : oriented to person, place, and time

## 2019-07-18 NOTE — REVIEW OF SYSTEMS
[Feeling Poorly] : feeling poorly [Shortness Of Breath] : shortness of breath [SOB on Exertion] : shortness of breath during exertion [Negative] : Musculoskeletal

## 2019-07-23 ENCOUNTER — APPOINTMENT (OUTPATIENT)
Dept: PULMONOLOGY | Facility: CLINIC | Age: 69
End: 2019-07-23
Payer: MEDICARE

## 2019-07-23 VITALS
RESPIRATION RATE: 16 BRPM | DIASTOLIC BLOOD PRESSURE: 65 MMHG | HEIGHT: 65 IN | WEIGHT: 138 LBS | HEART RATE: 73 BPM | BODY MASS INDEX: 22.99 KG/M2 | OXYGEN SATURATION: 98 % | SYSTOLIC BLOOD PRESSURE: 130 MMHG

## 2019-07-23 PROCEDURE — 96372 THER/PROPH/DIAG INJ SC/IM: CPT

## 2019-07-23 RX ORDER — OMALIZUMAB 150 MG/ML
150 INJECTION, SOLUTION SUBCUTANEOUS
Qty: 0 | Refills: 0 | Status: COMPLETED | OUTPATIENT
Start: 2019-07-23

## 2019-07-23 RX ORDER — OMALIZUMAB 75 MG/.5ML
75 INJECTION, SOLUTION SUBCUTANEOUS
Qty: 0 | Refills: 0 | Status: COMPLETED | OUTPATIENT
Start: 2019-07-23

## 2019-07-23 RX ADMIN — OMALIZUMAB 150 MG/ML: 150 INJECTION, SOLUTION SUBCUTANEOUS at 00:00

## 2019-07-23 RX ADMIN — OMALIZUMAB 75 MG/0.5ML: 75 INJECTION, SOLUTION SUBCUTANEOUS at 00:00

## 2019-07-27 NOTE — ASSESSMENT
[FreeTextEntry1] : 69 y/o female with a PMH of stage III A, T2N1 squamous cell carcinoma of anus, s/p chemo and radiation, tracheobronchomalacia s/p Right VATS, robotic assisted tracheobronchoplasty with Prolene mesh on 10/25/16. She is currently on Eliquis for Afib. She underwent a PET scan on 7/3/19 which revealed RML lesion that has grown in size, but decreased in activity. \par \par She was placed on Avalox x1 week by her oncologist, which she completed without improvement. She reports wheezing, worsening shortness of breath, and a productive cough with white sputum. On exam patient has an expiratory wheezing throughout her lung fields. WIll start on a Z-pack 500 mg qd x5 days and Medrol 32 mg daily x 1 week. Will also have patient evaluated by Dr. Eulalio Allison for further management. \par \par Patient will return 2 weeks, if breathing has improved will discuss repeat scan with plans for possible biopsy. \par \par I have reviewed the patient's medical records and diagnostic images at the time of this office visit and have made the following recommendations\par Plan:\par .1. Start Azithromycin 500 mg qd x5 days and medrol 32 mg qd x7 days\par 2. Referral to Dr. Eullaio Allison for pulmonary evaluation\par 3. RTO in 2 weeks for repeat assessment, if breathing improved, will speak with Dr. Portillo to discuss repeat scan and possible biopsy.

## 2019-07-27 NOTE — END OF VISIT
[FreeTextEntry3] : All medical record entries made by the Scribe were at my, Dr. Zapien's direction and personally dictated by me on 07/18/2019 . I have reviewed the chart and agree that the record accurately reflects my personal performance of the history, physical exam, assessment and plan. I have also personally directed, reviewed, and agreed with the chart.

## 2019-07-27 NOTE — HISTORY OF PRESENT ILLNESS
[FreeTextEntry1] : 69 y/o female with a PMH of stage III A, T2N1 squamous cell carcinoma of anus, s/p chemo and radiation, tracheobronchomalacia s/p Right VATS, robotic assisted tracheobronchoplasty with Prolene mesh on 10/25/16.  She is currently on Eliquis for Afib. She underwent PET scan on 7/3/19 which revealed resolving RML lesion. She was placed on ABX by her oncologist (see Dr. King's note). She presents today for a follow up visit. \par \par Awake bronchoscopy with lavage on 9/19/17:\par -less than 50% collapse of the proximal and mid trachea on cough and deep expiration, and 70-80% collapse of the distal trachea and bilateral mainstem bronchi\par -lavage culture was positive for Acinetobacter lwoffi. \par \par Admission to [facility] in late Dec 2017 for SOB. CXR and CT chest were done during hospitalization, with two stable exophytic pancreatic tail cysts seen. She also had colonoscopy done which demonstrated a 2 cm hard anal lesion. Pathology revealed gastric antral mucosa with mild reactive gastropathy.\par \par PET CT 1/5/18 revealed nonspecific FDG activity in the anal region. It also revealed mucoid impacted distal airways, small peripheral nodules and scattered peripheral right lower lobe tree-in-bud opacities (stable).\par MRI of the brain done on 1/17/18 showed no evidence of metastatic disease. \par \par She was seen by Dr. Terrell Luong and on 2/9/18 she underwent a biopsy of the anal lesion. Pathology showed recurrence of invasive, moderately to poorly differentiated squamous cell carcinoma. Dr. Luong determined she was not a surgical candidate and her oncologist Dr. Zach King determined she was not a candidate for systemic chemotherapy due to her cardiac and pulmonary issues and referred her to radiation oncology.\par \par She completed of 3000 cGy RT to anus on 4/18/18 with Dr. Amanda Burger, and tolerated the treatment well. \par She was last seen in our office on 3/15/18, the plan was for her to undergo RT. Once she completes the RT, will focus on management of her aortic valve disease. \par \par Echocardiogram done on 5/3/18:\par -EF 63% (previously 70% in March 2017), mild aortic regurgitation\par \par CT chest/ abd/ pelvis completed on 12/13/18:\par - stable post-op changes s/p distal colectomy \par - improvement of RIGHT lower lobe with consolidation compared to prior examination with mild residual opacity which is favored to be inflammatory in etiology\par - indeterminate nodular opacity along the dome of the right hemidiaphragm measuring 10 mm.\par - no evidence of metastatic disease in abdomen or pelvis\par - no evidence of thoracic, abdominal, or pelvic lymphadenopathy\par \par CT chest completed on 03/21/19:\par -high attenuation foci as well as branching tubular opacities in the anterior segment of the right lower lobe are unchanged \par \par PET CT completed on 04/18/19:\par -indeterminate FDG-avid Right middle lobe subpleural nodular opacity\par -non fdg-avid 1cm nodular density along the Right hemidiaphragm compared to prior PET/CT and increased in size as compared to prior \par \par CT chest completed on 04/24/19:\par -no consolidation\par -a 5mm nodule in the right lower lobe\par -bronchial wall thickening right worse than left lower lobe\par -centrilobular groundglass nodularity in the Right lower lobe\par -asymmetric reticulation identified subpleural right middle and anterior right lower lobe\par \par Spirometry done on 06/11/19 showed FEV1 = 0.74, 39% of predicted value, FVC = 1.62, 65% of predicted value, PEF = 2.08, 42% of predicted value.\par  \par CT chest completed on 06/21/19:\par -1.7 x 1.3cm nodule in the peripheral aspect of the anterior segment of the Right lower lobe, previously a patchy ill-defined opacity\par -mild increase in pleural thickening in the lateral segment of the right middle lobe\par \par PET scan completed on 7/3/19:\par - minimally FDG avid nodular opacity in subpleural RML is increased in size and decreased in metabolism as compared to prior PET (SUV 3.2) \par - mildly FDG avid 1.7 x 1.3 cm nodule in the right peripheral aspect of the anterior segment of the right lower lobe is increase in size and FDG avidity as compared to 4/18/19, and is not significantly changed as compared to CT date 6/21/10 (SUV 3.0) \par \par Patient denies hemoptysis, weight change, nausea, vomiting, diarrhea, chest pain, fever, or any recent illnesses or hospitalizations. She does complain of an ongoing cough and dyspnea on exertion.

## 2019-07-27 NOTE — ADDENDUM
[FreeTextEntry1] : Documented by Win Newberry acting as a scribe for Dr. Zohaib Zapien on 07/18/2019

## 2019-07-30 ENCOUNTER — APPOINTMENT (OUTPATIENT)
Dept: WOUND CARE | Facility: CLINIC | Age: 69
End: 2019-07-30
Payer: MEDICARE

## 2019-07-30 DIAGNOSIS — S31.839D: ICD-10-CM

## 2019-07-30 PROCEDURE — 99213 OFFICE O/P EST LOW 20 MIN: CPT

## 2019-07-30 NOTE — HISTORY OF PRESENT ILLNESS
[FreeTextEntry1] : 68 yr old had almost healing, no more bleeding, had  aquacel\par  has new vna helping with aquacel for perineal wound\par noted  a pouching out skin tag\par \par  previously had a bleeding episode, then had an abscess ,\par  toe nail removed by podiatrist  better\par  haveb a z pack before chest scan , has a cough and asthma/  recent 3cm lesion  on lung, PET negative for tumor, took a z pack\par  s/p spine eval s/p right t12 , right sided spine pain, nerve symptoms, just pain in back, on steroids chronically worried about copmpression frx/ h/obartholin cysts\par location- anal area( s/p removal)\par severity mild, no pain , no fever, VNA just finished\par  mri july2018, recurrent disease at anal verge; ct in [place], miguel lung opacities; dec ct llq ostomy pubic symphysis with screws, one in right sacroiliac joint, no bony lesions, no recurrent disease\par timing/duration stopped nov 16, started on nov 27; last discharge  jan 11-19 and jan 29-feb5  copd exacerbation, pneumonia, intergluteal fistula sinus/ has vna to check on her\par quality- blood output, the rest was leaking\par had radiation 20 in ten days and 6 weeks\par  asthma / medrol every day\par  had a vac in past\par

## 2019-07-30 NOTE — PHYSICAL EXAM
[Respiratory Effort] : normal respiratory effort [Normal Rate and Rhythm] : normal rate and rhythm [2+] : left 2+ [No Rash or Lesion] : No rash or lesion [No HSM] : no hepatosplenomegaly [Calm] : calm [Oriented to Person] : oriented to person [Oriented to Place] : oriented to place [JVD] : no jugular venous distention  [Ankle Swelling (On Exam)] : not present [de-identified] : nad [Abdomen Tenderness] : ~T ~M No abdominal tenderness [de-identified] : urine ok- not incontinent [de-identified] : stoma llq [de-identified] : resected [de-identified] : rom intact/ uses drive walker [FreeTextEntry1] : sacral cleft [FreeTextEntry3] : .2 cm [FreeTextEntry2] : .2 [FreeTextEntry4] : 0 [de-identified] : previously .2/.2/1\par \par moisture  in  tunnel, epithelialized, no drainage, dry \par small skin tag 2 inches off midline, with compression flattens, then refills, prepped and attempt at aspiration with 25gauge needle, no liquid, - betadine wiped off with hibiclens, h/o iv contrast allergy/ iodine, no rash or reaction to betadine wipe.  [FreeTextEntry8] : .5 [FreeTextEntry7] : left great toe [de-identified] : medial nail removed [FreeTextEntry9] : .1 [de-identified] : .1 [de-identified] : 1 [de-identified] : witch hazel [de-identified] : 1 [de-identified] : perineum Raised skin tag [de-identified] : Skin tag

## 2019-07-30 NOTE — ASSESSMENT
[FreeTextEntry1] : 67 yo b woman with  with scar from apr surgery for  sq cell cancer  t2no with radiation,\par  s/p fistula in incision, s/p antibiotics z pack for possible pneumonia/bronchitis\par small redundant skin tag, no bulla or fluid,  suggest witchhazel wipes /compresses\par original midline cleft in apr site epithelialized with aquacel\par asthmatic, s/p copd recurrence on medrol, with stoma, recent admit for pneumonia on \par  \par  \par discussed use of hbo for radiated wounds/currently closed\par  complexity-moderate lab, xr, old record reviewed, test results-reviewed, \par no need for imaging currently - ulcer healed\par  risk- high for surgery in radiated field- no surgical debridement at this time\par continue offloading\par follow up in 4 weeksprn How Severe Is Your Rash?: moderate Is This A New Presentation, Or A Follow-Up?: Rash

## 2019-07-31 ENCOUNTER — FORM ENCOUNTER (OUTPATIENT)
Age: 69
End: 2019-07-31

## 2019-08-01 ENCOUNTER — APPOINTMENT (OUTPATIENT)
Dept: THORACIC SURGERY | Facility: CLINIC | Age: 69
End: 2019-08-01
Payer: MEDICARE

## 2019-08-01 ENCOUNTER — OUTPATIENT (OUTPATIENT)
Dept: OUTPATIENT SERVICES | Facility: HOSPITAL | Age: 69
LOS: 1 days | End: 2019-08-01
Payer: MEDICARE

## 2019-08-01 VITALS
HEIGHT: 65 IN | DIASTOLIC BLOOD PRESSURE: 70 MMHG | RESPIRATION RATE: 17 BRPM | BODY MASS INDEX: 22.82 KG/M2 | OXYGEN SATURATION: 98 % | SYSTOLIC BLOOD PRESSURE: 130 MMHG | TEMPERATURE: 98 F | WEIGHT: 137 LBS | HEART RATE: 75 BPM

## 2019-08-01 DIAGNOSIS — Z98.890 OTHER SPECIFIED POSTPROCEDURAL STATES: Chronic | ICD-10-CM

## 2019-08-01 DIAGNOSIS — H05.352 EXOSTOSIS OF LEFT ORBIT: Chronic | ICD-10-CM

## 2019-08-01 DIAGNOSIS — Z96.653 PRESENCE OF ARTIFICIAL KNEE JOINT, BILATERAL: Chronic | ICD-10-CM

## 2019-08-01 DIAGNOSIS — Z87.09 PERSONAL HISTORY OF OTHER DISEASES OF THE RESPIRATORY SYSTEM: Chronic | ICD-10-CM

## 2019-08-01 DIAGNOSIS — Z98.89 OTHER SPECIFIED POSTPROCEDURAL STATES: Chronic | ICD-10-CM

## 2019-08-01 DIAGNOSIS — K62.5 HEMORRHAGE OF ANUS AND RECTUM: Chronic | ICD-10-CM

## 2019-08-01 PROCEDURE — 71046 X-RAY EXAM CHEST 2 VIEWS: CPT | Mod: 26

## 2019-08-01 PROCEDURE — 99214 OFFICE O/P EST MOD 30 MIN: CPT

## 2019-08-01 PROCEDURE — 71046 X-RAY EXAM CHEST 2 VIEWS: CPT

## 2019-08-01 NOTE — ADDENDUM
[FreeTextEntry1] : Documented by Win Newberry acting as a scribe for Dr. Zohaib Zapien on 08/01/2019

## 2019-08-01 NOTE — END OF VISIT
[FreeTextEntry3] : All medical record entries made by the Scribe were at my, Dr. Zapien's direction and personally dictated by me on 08/01/2019 . I have reviewed the chart and agree that the record accurately reflects my personal performance of the history, physical exam, assessment and plan. I have also personally directed, reviewed, and agreed with the chart.

## 2019-08-01 NOTE — PHYSICAL EXAM
[Sclera] : the sclera and conjunctiva were normal [PERRL With Normal Accommodation] : pupils were equal in size, round, and reactive to light [Neck Appearance] : the appearance of the neck was normal [Apical Impulse] : the apical impulse was normal [Respiration, Rhythm And Depth] : normal respiratory rhythm and effort [Heart Rate And Rhythm] : heart rate was normal and rhythm regular [Examination Of The Chest] : the chest was normal in appearance [2+] : left 2+ [Bowel Sounds] : normal bowel sounds [Abdomen Soft] : soft [Abnormal Walk] : normal gait [Skin Color & Pigmentation] : normal skin color and pigmentation [Musculoskeletal - Swelling] : no joint swelling seen [] : no rash [Skin Turgor] : normal skin turgor [Oriented To Time, Place, And Person] : oriented to person, place, and time [Impaired Insight] : insight and judgment were intact [Affect] : the affect was normal

## 2019-08-01 NOTE — ASSESSMENT
[FreeTextEntry1] : 67 y/o female with a PMH of stage III A, T2N1 squamous cell carcinoma of anus, s/p chemo and radiation, tracheobronchomalacia s/p Right VATS, robotic assisted tracheobronchoplasty with Prolene mesh on 10/25/16\par \par Reviewed PFTs, she has had a persistent decline in PFTs that demonstrate severe airway obstruction. Pt is currently being evaluated for possible metastatic cancer related to colorectal primary. Will re-evaluate patient after she completes CT chest scan on 8/13/2019 and she will return here for follow-up. During this time period will be evaluated for heterogenous emphysema including VQ and echocardiogram. She was instructed to follow-up with Dr. Leahy to complete ECHO. \par \par I have reviewed the patient's medical records and diagnostic images at the time of this office visit and have made the following recommendations\par \par Plan:\par 1. Complete repeat CT scan on 8/13\par 2. Obtain VQ and echocardiogram, follow-up with Dr. Leahy in cardiology\par 3. RTO after the above to discuss results and plan of care.

## 2019-08-06 ENCOUNTER — APPOINTMENT (OUTPATIENT)
Dept: PULMONOLOGY | Facility: CLINIC | Age: 69
End: 2019-08-06
Payer: MEDICARE

## 2019-08-06 VITALS
HEART RATE: 87 BPM | HEIGHT: 65 IN | OXYGEN SATURATION: 97 % | BODY MASS INDEX: 23.32 KG/M2 | SYSTOLIC BLOOD PRESSURE: 112 MMHG | RESPIRATION RATE: 14 BRPM | WEIGHT: 140 LBS | DIASTOLIC BLOOD PRESSURE: 70 MMHG

## 2019-08-06 PROCEDURE — 96372 THER/PROPH/DIAG INJ SC/IM: CPT

## 2019-08-06 PROCEDURE — 99215 OFFICE O/P EST HI 40 MIN: CPT | Mod: 25

## 2019-08-06 RX ORDER — OMALIZUMAB 75 MG/.5ML
75 INJECTION, SOLUTION SUBCUTANEOUS
Qty: 0 | Refills: 0 | Status: COMPLETED | OUTPATIENT
Start: 2019-08-06

## 2019-08-06 RX ORDER — OMALIZUMAB 150 MG/ML
150 INJECTION, SOLUTION SUBCUTANEOUS
Qty: 0 | Refills: 0 | Status: COMPLETED | OUTPATIENT
Start: 2019-08-06

## 2019-08-06 RX ADMIN — OMALIZUMAB 0 MG/0.5ML: 75 INJECTION, SOLUTION SUBCUTANEOUS at 00:00

## 2019-08-06 RX ADMIN — OMALIZUMAB 0 MG/ML: 150 INJECTION, SOLUTION SUBCUTANEOUS at 00:00

## 2019-08-08 NOTE — REASON FOR VISIT
[FreeTextEntry1] : abnormal CXR/chest CT, allergic rhinitis, severe persistent asthma, bronchiectasis, GERD, COPD, recurrent PNA, PND, s/p tracheoplasty, TBM, and SOB [Follow-Up] : a follow-up visit

## 2019-08-08 NOTE — PHYSICAL EXAM
[General Appearance - Well Developed] : well developed [Well Groomed] : well groomed [General Appearance - Well Nourished] : well nourished [Normal Appearance] : normal appearance [No Deformities] : no deformities [General Appearance - In No Acute Distress] : no acute distress [Eyelids - No Xanthelasma] : the eyelids demonstrated no xanthelasmas [Normal Conjunctiva] : the conjunctiva exhibited no abnormalities [Normal Oropharynx] : normal oropharynx [II] : II [Neck Appearance] : the appearance of the neck was normal [Neck Cervical Mass (___cm)] : no neck mass was observed [Thyroid Diffuse Enlargement] : the thyroid was not enlarged [Jugular Venous Distention Increased] : there was no jugular-venous distention [Thyroid Nodule] : there were no palpable thyroid nodules [Heart Rate And Rhythm] : heart rate and rhythm were normal [Heart Sounds] : normal S1 and S2 [Respiration, Rhythm And Depth] : normal respiratory rhythm and effort [Auscultation Breath Sounds / Voice Sounds] : lungs were clear to auscultation bilaterally [Exaggerated Use Of Accessory Muscles For Inspiration] : no accessory muscle use [Abdomen Tenderness] : non-tender [Abdomen Soft] : soft [Abdomen Mass (___ Cm)] : no abdominal mass palpated [FreeTextEntry1] : ostomy in place  [Abnormal Walk] : normal gait [Gait - Sufficient For Exercise Testing] : the gait was sufficient for exercise testing [Nail Clubbing] : no clubbing of the fingernails [Cyanosis, Localized] : no localized cyanosis [Petechial Hemorrhages (___cm)] : no petechial hemorrhages [FreeTextEntry2] : Left foot wound  [Skin Color & Pigmentation] : normal skin color and pigmentation [] : no rash [No Venous Stasis] : no venous stasis [Skin Lesions] : no skin lesions [No Xanthoma] : no  xanthoma was observed [No Skin Ulcers] : no skin ulcer [Deep Tendon Reflexes (DTR)] : deep tendon reflexes were 2+ and symmetric [Sensation] : the sensory exam was normal to light touch and pinprick [Oriented To Time, Place, And Person] : oriented to person, place, and time [No Focal Deficits] : no focal deficits [Impaired Insight] : insight and judgment were intact [Affect] : the affect was normal

## 2019-08-08 NOTE — HISTORY OF PRESENT ILLNESS
[FreeTextEntry1] : Ms. Bloom is a 68 year old female with a history of abnormal CXR/chest CT, allergic rhinitis, severe persistent asthma, bronchiectasis, GERD, COPD, recurrent PNA, PND, s/p tracheoplasty, TBM, and SOB presenting to the office today for a routine xolair injection and discuss her lung condition.\par \par Pt reports she has been needing to nebulize very frequently and is getting little relief from her med regimen. She was recently on larger doses of medrol for a flare and is getting concerned about her lung function. \par \par She recently saw Dr. Zapien for her abnormal chest CT/PETCT for concern of possible metastatic cancer related to her prior colorectal cancer. She is due for another CT scan next week to see if she needs to have a biopsy. She reports she spoke with Dr. King and she reports that he did not feel biopsy was necessary at this time. \par \par Given her recent need for steroids, suboptimal management of her breathing issues with med regimen- there is consideration of emphysema as a cause of her decline. Dr. Zapien has planned for a VQ scan/echo to further evaluate heterogenous emphysema and to consider possible surgical intervention for it.\par \par The patient would like our office to be kept in the loop about this.\par \par Additionally, although she received her xolair injection today. She feels she is not getting any benefit from this and does not feel she wants to continue with injections. She would like to know Dr. Allison's opinion however at this point if her symptoms are related to emphysema- she does not at this point see the need for Xolair.

## 2019-08-08 NOTE — REVIEW OF SYSTEMS
[As Noted in HPI] : as noted in HPI [Dyspnea] : dyspnea [Wheezing] : wheezing [Negative] : Sleep Disorder

## 2019-08-08 NOTE — ASSESSMENT
[FreeTextEntry1] : The plan is as follows:\par \par problem 1: asthma -severe\par - continue Medrol 4 mg daily\par -continue to use albuterol via the nebulizer QID \par -followed by Mucomyst QiD\par -followed by the acapella device/ chest vest therapy \par -followed by Perforomist via the nebulizer BID\par -followed by Budesonide via the nebulizer BID \par -continue to use Breo Ellipta 200 at 1 inhalation QD \par -continue to use Spiriva 1 inhalation QD\par -Xolair 225 Q 2 weeks- injection today. Discussion with patient on indication and published studies on the expectations of what xolair can do for a patient with severe asthma. She does not feel that it is helping her at this point and would like to cancel future injection appointments. She would like Dr. Allison's opinion as well. I will discuss with him and get back to her. \par -continue to use Accolate 20 mg BID\par \par problem 2: tracheomalacia, residual bronchomalacia \par -s/p tracheoplasty with Dr. [place] Zapien\par -continue to follow up \par \par problem 3: chronic bronchitis and mucus clearance\par -continue to use acapella device multiple times daily\par -recommended to use chest vest therapy multiple times daily \par \par problem 4: Heterogenous emphysema work up pending\par -VQ scan planned\par -echo planned\par -following with Dr. Zapien/will update Dr. Allison \par \par problem 5: abnormal chest CT/PET/CT new nodule\par - F/U CT scan next week\par -sputums were negative for HERMELINDO/DIEUDONNE\par -following with \par \par problem 6: GERD\par -continue to use Protonix 40 mg before breakfast\par -continue Baclofen 5 mg pre-meal and QHS\par \par problem 7: allergic rhinitis \par -continue to use nasal saline\par -continue to use Xyzal 5 mg before bed\par \par Problem 8: Sensory Neuropathic cough \par - Continue  Amitriptyline 10 mg QHS for the first weeks then up to TID\par \par problem 9: colon cancer\par -s/p radiation therapy, surgery \par -continue to use chemotherapy follow up with Dr. King or Dr. Mario\par \par problem 10: hx of abnormal aortic valve / cardiac health\par -continue to follow up with Dr. Leahy, AV Disease, AF\par \par F/U as scheduled with Dr. Allison\par She is encouraged to call with any changes, concerns, or questions.

## 2019-08-12 ENCOUNTER — FORM ENCOUNTER (OUTPATIENT)
Age: 69
End: 2019-08-12

## 2019-08-13 ENCOUNTER — APPOINTMENT (OUTPATIENT)
Dept: NUCLEAR MEDICINE | Facility: HOSPITAL | Age: 69
End: 2019-08-13
Payer: MEDICARE

## 2019-08-13 ENCOUNTER — OUTPATIENT (OUTPATIENT)
Dept: OUTPATIENT SERVICES | Facility: HOSPITAL | Age: 69
LOS: 1 days | End: 2019-08-13

## 2019-08-13 ENCOUNTER — OUTPATIENT (OUTPATIENT)
Dept: OUTPATIENT SERVICES | Facility: HOSPITAL | Age: 69
LOS: 1 days | End: 2019-08-13
Payer: MEDICARE

## 2019-08-13 ENCOUNTER — APPOINTMENT (OUTPATIENT)
Dept: CT IMAGING | Facility: IMAGING CENTER | Age: 69
End: 2019-08-13
Payer: MEDICARE

## 2019-08-13 DIAGNOSIS — Z87.09 PERSONAL HISTORY OF OTHER DISEASES OF THE RESPIRATORY SYSTEM: Chronic | ICD-10-CM

## 2019-08-13 DIAGNOSIS — H05.352 EXOSTOSIS OF LEFT ORBIT: Chronic | ICD-10-CM

## 2019-08-13 DIAGNOSIS — Z98.890 OTHER SPECIFIED POSTPROCEDURAL STATES: Chronic | ICD-10-CM

## 2019-08-13 DIAGNOSIS — K62.5 HEMORRHAGE OF ANUS AND RECTUM: Chronic | ICD-10-CM

## 2019-08-13 DIAGNOSIS — Z96.653 PRESENCE OF ARTIFICIAL KNEE JOINT, BILATERAL: Chronic | ICD-10-CM

## 2019-08-13 DIAGNOSIS — Z98.89 OTHER SPECIFIED POSTPROCEDURAL STATES: Chronic | ICD-10-CM

## 2019-08-13 DIAGNOSIS — R06.02 SHORTNESS OF BREATH: ICD-10-CM

## 2019-08-13 DIAGNOSIS — J47.9 BRONCHIECTASIS, UNCOMPLICATED: ICD-10-CM

## 2019-08-13 PROCEDURE — 71250 CT THORAX DX C-: CPT

## 2019-08-13 PROCEDURE — 78582 LUNG VENTILAT&PERFUS IMAGING: CPT

## 2019-08-13 PROCEDURE — 71046 X-RAY EXAM CHEST 2 VIEWS: CPT

## 2019-08-13 PROCEDURE — 71046 X-RAY EXAM CHEST 2 VIEWS: CPT | Mod: 26

## 2019-08-13 PROCEDURE — 71250 CT THORAX DX C-: CPT | Mod: 26

## 2019-08-13 PROCEDURE — 78582 LUNG VENTILAT&PERFUS IMAGING: CPT | Mod: 26

## 2019-08-13 PROCEDURE — A9540: CPT

## 2019-08-13 PROCEDURE — A9567: CPT

## 2019-08-15 ENCOUNTER — APPOINTMENT (OUTPATIENT)
Dept: THORACIC SURGERY | Facility: CLINIC | Age: 69
End: 2019-08-15
Payer: MEDICARE

## 2019-08-15 PROCEDURE — 99215 OFFICE O/P EST HI 40 MIN: CPT

## 2019-08-15 NOTE — REVIEW OF SYSTEMS
[Shortness Of Breath] : shortness of breath [Cough] : cough [SOB on Exertion] : shortness of breath during exertion [Negative] : Musculoskeletal

## 2019-08-20 ENCOUNTER — OUTPATIENT (OUTPATIENT)
Dept: OUTPATIENT SERVICES | Facility: HOSPITAL | Age: 69
LOS: 1 days | Discharge: HOME | End: 2019-08-20

## 2019-08-20 ENCOUNTER — APPOINTMENT (OUTPATIENT)
Dept: PULMONOLOGY | Facility: CLINIC | Age: 69
End: 2019-08-20

## 2019-08-20 DIAGNOSIS — H05.352 EXOSTOSIS OF LEFT ORBIT: Chronic | ICD-10-CM

## 2019-08-20 DIAGNOSIS — Z98.89 OTHER SPECIFIED POSTPROCEDURAL STATES: Chronic | ICD-10-CM

## 2019-08-20 DIAGNOSIS — Z98.890 OTHER SPECIFIED POSTPROCEDURAL STATES: Chronic | ICD-10-CM

## 2019-08-20 DIAGNOSIS — Z96.653 PRESENCE OF ARTIFICIAL KNEE JOINT, BILATERAL: Chronic | ICD-10-CM

## 2019-08-20 DIAGNOSIS — C18.9 MALIGNANT NEOPLASM OF COLON, UNSPECIFIED: ICD-10-CM

## 2019-08-20 DIAGNOSIS — Z87.09 PERSONAL HISTORY OF OTHER DISEASES OF THE RESPIRATORY SYSTEM: Chronic | ICD-10-CM

## 2019-08-20 DIAGNOSIS — K62.5 HEMORRHAGE OF ANUS AND RECTUM: Chronic | ICD-10-CM

## 2019-08-22 ENCOUNTER — APPOINTMENT (OUTPATIENT)
Dept: INFUSION THERAPY | Facility: HOSPITAL | Age: 69
End: 2019-08-22

## 2019-08-22 ENCOUNTER — RESULT REVIEW (OUTPATIENT)
Age: 69
End: 2019-08-22

## 2019-08-22 ENCOUNTER — LABORATORY RESULT (OUTPATIENT)
Age: 69
End: 2019-08-22

## 2019-08-22 ENCOUNTER — APPOINTMENT (OUTPATIENT)
Dept: HEMATOLOGY ONCOLOGY | Facility: CLINIC | Age: 69
End: 2019-08-22
Payer: MEDICARE

## 2019-08-22 VITALS
SYSTOLIC BLOOD PRESSURE: 135 MMHG | HEART RATE: 70 BPM | BODY MASS INDEX: 23.59 KG/M2 | DIASTOLIC BLOOD PRESSURE: 77 MMHG | WEIGHT: 141.74 LBS | RESPIRATION RATE: 16 BRPM | OXYGEN SATURATION: 95 % | TEMPERATURE: 98.7 F

## 2019-08-22 LAB
BASOPHILS # BLD AUTO: 0 K/UL — SIGNIFICANT CHANGE UP (ref 0–0.2)
BASOPHILS NFR BLD AUTO: 0.2 % — SIGNIFICANT CHANGE UP (ref 0–2)
EOSINOPHIL # BLD AUTO: 0.1 K/UL — SIGNIFICANT CHANGE UP (ref 0–0.5)
EOSINOPHIL NFR BLD AUTO: 1.5 % — SIGNIFICANT CHANGE UP (ref 0–6)
HCT VFR BLD CALC: 41 % — SIGNIFICANT CHANGE UP (ref 34.5–45)
HGB BLD-MCNC: 12.8 G/DL — SIGNIFICANT CHANGE UP (ref 11.5–15.5)
LYMPHOCYTES # BLD AUTO: 1 K/UL — SIGNIFICANT CHANGE UP (ref 1–3.3)
LYMPHOCYTES # BLD AUTO: 13.3 % — SIGNIFICANT CHANGE UP (ref 13–44)
MCHC RBC-ENTMCNC: 23.9 PG — LOW (ref 27–34)
MCHC RBC-ENTMCNC: 31.3 G/DL — LOW (ref 32–36)
MCV RBC AUTO: 76.4 FL — LOW (ref 80–100)
MONOCYTES # BLD AUTO: 0.4 K/UL — SIGNIFICANT CHANGE UP (ref 0–0.9)
MONOCYTES NFR BLD AUTO: 4.8 % — SIGNIFICANT CHANGE UP (ref 2–14)
NEUTROPHILS # BLD AUTO: 6 K/UL — SIGNIFICANT CHANGE UP (ref 1.8–7.4)
NEUTROPHILS NFR BLD AUTO: 80.2 % — HIGH (ref 43–77)
PLATELET # BLD AUTO: 279 K/UL — SIGNIFICANT CHANGE UP (ref 150–400)
RBC # BLD: 5.36 M/UL — HIGH (ref 3.8–5.2)
RBC # FLD: 16 % — HIGH (ref 10.3–14.5)
WBC # BLD: 7.4 K/UL — SIGNIFICANT CHANGE UP (ref 3.8–10.5)
WBC # FLD AUTO: 7.4 K/UL — SIGNIFICANT CHANGE UP (ref 3.8–10.5)

## 2019-08-22 PROCEDURE — 99214 OFFICE O/P EST MOD 30 MIN: CPT

## 2019-08-22 NOTE — HISTORY OF PRESENT ILLNESS
[de-identified] : Patient developed bleeding AUG 2016 from rectum. A colonoscopy in MAR 2016 was unrevealing. It was thought to be a hemorrhoid in the past. Her HGB began to drop. She was examined She called PCP and referred to GI surgeon (Dr. Magallanes). A mass was found and biopsy showed anal cancer, squamous cell. A PET scan did not reveal any local or metastatic disease. She received Mitomycin on 5/3/2017 (dose #2 mitomycin on 6/1/2017) and Xeloda. Radiation 5/3/17 to 6/16/17 at 5400 cGy. \par \par Patient also with h/o adrenal insufficiency after 50 years of steroids for asthma and tracheomalacia (mesh placed 10/2016, Dr. Mcclellan at [facility]). She has diabetes that requires insulin, without sequelae on the kidney or eye. 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 Will continue to monitor. \par Breast screening: She had breast mammogram and US in 2017 found 2 lesions, and both benign. Rt breast - "benign, nonproliferative fibrocystic changes"; Lt Breast - "fibroadenoma".  Due for repeat 2/2018.\par Cardiac: Will be undergoing SAFIA (Dr. Leahy) for re-evaluation of aortic valve, and bronchoscopy (Dr. Mcclellan). Her valve showed moderate to severe AR.\par \par 5/29/2018: Patient has local relapse in anus proved by biopsy in February 2018. It is again SCC and HPV / p16 is positive. PET 1/2018 showed no metastatic disease. Colonoscopy 2/2018 showed no colonic lesion, only anal mass that is palpable by ARIAS. Patient is deemed not a surgical candidate for APR due to pulmonary and cardiac risk. She completed second course of radiotherapy in early 4/2018 with Dr. Amanda Burger. She was recently hospitalized for high fever. No clear source - blood and urine cultures were negative. The CAT scan showed minor opacities that were not consistent with pnuemonia. She was admitted for 5 days and given IV antibiotics empirically. She now has weakness in legs and uses walker for balance. She will be getting a home rehabilitation visit soon. Does feel lightheaded at times.\par \par 8/22/2019:\par 1: Anal Cancer: Patient underwent APR surgery on 7/24/2018. Pathology post-operatively is ypT2N0; squamous cell cancer. The tissue is healed.  She is due to repeat colonoscopy. \par 2: Lung nodule: She had new lung nodule on right hemidiaphragm that is 1 cm and not FDG avid. She has a second 1 cm nodule on the RML that is subplueral and has mild FDG. Repeat CAT 8/13/19 shows that there is slight improvement of RLL nodule. She saw Dr. Zapien and he agrees to continue to surveillance.\par 3: Cardiac: Undergoing evaluation for possible pulmonary HTN. She also has aortic regurgitation. She has baseline COPD.\par 4: Social: She lives with her sister. She is doing volunteer work, and helps in soup kitchen.\par 5: Pulmonary - using nebulizers daily and less cough. [de-identified] : Pulmonary: Eulalio Allison  [phone]\par GI surgeon: Terrell Luong; [phone]\par PCP: Anastasiya Jin [phone] \par Cardiologist: Steven Leahy [phone]\par Radiation Oncology: Amanda Burger and Allie Mart\par Colonoscopy: Rafael\par Wound: Huma Gray [phone]\par \par [facility] doctors: \par PCP/Cardiology: Jordi Engel\par Thoracic Surgeon: Zohaib Zapien

## 2019-08-22 NOTE — PHYSICAL EXAM
[Ambulatory and capable of all self care but unable to carry out any work activities] : Status 2- Ambulatory and capable of all self care but unable to carry out any work activities. Up and about more than 50% of waking hours [Thin] : thin [Normal] : affect appropriate [de-identified] : Left eye enucleation [de-identified] : mild wheeze; upper airway sounds transmitted.  [FreeTextEntry1] : Stoma is functioning well. Small opening at anal site, and no discharge. [de-identified] : non-focal

## 2019-08-22 NOTE — REVIEW OF SYSTEMS
[Negative] : Constitutional [FreeTextEntry8] : burning [FreeTextEntry6] : chronic cough - has been better [de-identified] : She walks without cane; gait off balance.

## 2019-08-23 ENCOUNTER — APPOINTMENT (OUTPATIENT)
Dept: CV DIAGNOSITCS | Facility: HOSPITAL | Age: 69
End: 2019-08-23

## 2019-08-23 ENCOUNTER — OUTPATIENT (OUTPATIENT)
Dept: OUTPATIENT SERVICES | Facility: HOSPITAL | Age: 69
LOS: 1 days | End: 2019-08-23
Payer: MEDICARE

## 2019-08-23 DIAGNOSIS — Z96.653 PRESENCE OF ARTIFICIAL KNEE JOINT, BILATERAL: Chronic | ICD-10-CM

## 2019-08-23 DIAGNOSIS — Z98.89 OTHER SPECIFIED POSTPROCEDURAL STATES: Chronic | ICD-10-CM

## 2019-08-23 DIAGNOSIS — K62.5 HEMORRHAGE OF ANUS AND RECTUM: Chronic | ICD-10-CM

## 2019-08-23 DIAGNOSIS — H05.352 EXOSTOSIS OF LEFT ORBIT: Chronic | ICD-10-CM

## 2019-08-23 DIAGNOSIS — Z87.09 PERSONAL HISTORY OF OTHER DISEASES OF THE RESPIRATORY SYSTEM: Chronic | ICD-10-CM

## 2019-08-23 DIAGNOSIS — R06.02 SHORTNESS OF BREATH: ICD-10-CM

## 2019-08-23 DIAGNOSIS — Z98.890 OTHER SPECIFIED POSTPROCEDURAL STATES: Chronic | ICD-10-CM

## 2019-08-23 PROCEDURE — 93306 TTE W/DOPPLER COMPLETE: CPT | Mod: 26

## 2019-08-23 NOTE — HISTORY OF PRESENT ILLNESS
[FreeTextEntry1] : 69 y/o female with a PMH of stage III A, T2N1 squamous cell carcinoma of anus, s/p chemo and radiation, tracheobronchomalacia s/p Right VATS, robotic assisted tracheobronchoplasty with Prolene mesh on 10/25/16. She is currently on Eliquis for Afib. She underwent PET scan on 7/3/19 which revealed resolving RML lesion. She presents today for reassessment of symptoms. Last office visit she was started on steroids and abx for expiratory wheezing. She presents today for a follow up visit with a VQ and ECHO and CT chest.  \par \par Awake bronchoscopy with lavage on 9/19/17:\par -less than 50% collapse of the proximal and mid trachea on cough and deep expiration, and 70-80% collapse of the distal trachea and bilateral mainstem bronchi\par -lavage culture was positive for Acinetobacter lwoffi. \par \par Admission to [facility] in late Dec 2017 for SOB. CXR and CT chest were done during hospitalization, with two stable exophytic pancreatic tail cysts seen. She also had colonoscopy done which demonstrated a 2 cm hard anal lesion. Pathology revealed gastric antral mucosa with mild reactive gastropathy.\par \par PET CT 1/5/18 revealed nonspecific FDG activity in the anal region. It also revealed mucoid impacted distal airways, small peripheral nodules and scattered peripheral right lower lobe tree-in-bud opacities (stable).\par MRI of the brain done on 1/17/18 showed no evidence of metastatic disease. \par \par She was seen by Dr. Terrell Luong and on 2/9/18 she underwent a biopsy of the anal lesion. Pathology showed recurrence of invasive, moderately to poorly differentiated squamous cell carcinoma. Dr. Luong determined she was not a surgical candidate and her oncologist Dr. Zach King determined she was not a candidate for systemic chemotherapy due to her cardiac and pulmonary issues and referred her to radiation oncology.\par \par She completed of 3000 cGy RT to anus on 4/18/18 with Dr. Amanda Burger, and tolerated the treatment well. \par She was last seen in our office on 3/15/18, the plan was for her to undergo RT. Once she completes the RT, will focus on management of her aortic valve disease. \par \par Echocardiogram done on 5/3/18:\par -EF 63% (previously 70% in March 2017), mild aortic regurgitation\par \par CT chest/ abd/ pelvis completed on 12/13/18:\par - stable post-op changes s/p distal colectomy \par - improvement of RIGHT lower lobe with consolidation compared to prior examination with mild residual opacity which is favored to be inflammatory in etiology\par - indeterminate nodular opacity along the dome of the right hemidiaphragm measuring 10 mm.\par - no evidence of metastatic disease in abdomen or pelvis\par - no evidence of thoracic, abdominal, or pelvic lymphadenopathy\par \par CT chest completed on 03/21/19:\par -high attenuation foci as well as branching tubular opacities in the anterior segment of the right lower lobe are unchanged \par \par PET CT completed on 04/18/19:\par -indeterminate FDG-avid Right middle lobe subpleural nodular opacity\par -non fdg-avid 1cm nodular density along the Right hemidiaphragm compared to prior PET/CT and increased in size as compared to prior \par \par CT chest completed on 04/24/19:\par -no consolidation\par -a 5mm nodule in the right lower lobe\par -bronchial wall thickening right worse than left lower lobe\par -centrilobular groundglass nodularity in the Right lower lobe\par -asymmetric reticulation identified subpleural right middle and anterior right lower lobe\par \par Spirometry done on 06/11/19 showed FEV1 = 0.74, 39% of predicted value, FVC = 1.62, 65% of predicted value, PEF = 2.08, 42% of predicted value.\par  \par CT chest completed on 06/21/19:\par -1.7 x 1.3cm nodule in the peripheral aspect of the anterior segment of the Right lower lobe, previously a patchy ill-defined opacity\par -mild increase in pleural thickening in the lateral segment of the right middle lobe\par \par PET scan completed on 7/3/19:\par - minimally FDG avid nodular opacity in subpleural RML is increased in size and decreased in metabolism as compared to prior PET (SUV 3.2) \par - mildly FDG avid 1.7 x 1.3 cm nodule in the right peripheral aspect of the anterior segment of the right lower lobe is increase in size and FDG avidity as compared to 4/18/19, and is not significantly changed as compared to CT date 6/21/10 (SUV 3.0) \par \par CT chest completed on 08/13/19:\par -right lower lobe peripheral nodule measures 1.6 x 1.1cm has slightly decreased in size when compared \par -right middle lobe peripheral pleural-based opacity measures 2.7 x 0.9cm and is unchanged \par -minimal areas of bronchial thickening in the right middle lobe and bilateral lower lobes. \par -right basilar nodule measuring 1.0 cm is uncharged \par \par NM VQ scan completed on 08/13/19:\par -low probability of pulmonary embolus \par -interval resolution of matched ventilation/ perfusion defect involving the basal segment of the left lower lobe\par

## 2019-08-23 NOTE — ASSESSMENT
[FreeTextEntry1] : 67 y/o female with a PMH of stage III A, T2N1 squamous cell carcinoma of anus, s/p chemo and radiation, tracheobronchomalacia s/p Right VATS, robotic assisted tracheobronchoplasty with Prolene mesh on 10/25/16. \par \par Patient still with persistent dyspnea and productive cough with presence of occasional green sputum. She admits to using the nebulizer qid without relief. She does report improvement in breathing since her tracheobronchoplasty. She will be started on Dupilumab by Dr. Allison.  \par \par Recent CT chest was reviewed that reveals evidence of stable lung nodules. Will continue to be surveilled by Dr. King and myself. CT chest and VQ scan does demonstrate emphysema that will need to be medically managed. CT demonstrated an elevated pulmonary artery measuring 3.5cm which may be contributing to her ongoing shortness of breath. I would recommend further Pulmonary HTN evaluation by Dr. Rosanne Rosenbaum as [facility]. Patient will be having her echocardiogram on 8/23/19. \par \par Plan:\par 1. Evaluation by Dr. Rosanne Rosenbaum at [facility] for pulmonary hyptertension

## 2019-08-23 NOTE — PHYSICAL EXAM
[] : no respiratory distress [Respiration, Rhythm And Depth] : normal respiratory rhythm and effort [Exaggerated Use Of Accessory Muscles For Inspiration] : no accessory muscle use [Apical Impulse] : the apical impulse was normal [Heart Rate And Rhythm] : heart rate was normal and rhythm regular [Heart Sounds] : normal S1 and S2 [Examination Of The Chest] : the chest was normal in appearance [2+] : left 2+ [Oriented To Time, Place, And Person] : oriented to person, place, and time [FreeTextEntry1] : expiratory wheezing noted

## 2019-08-26 ENCOUNTER — INPATIENT (INPATIENT)
Facility: HOSPITAL | Age: 69
LOS: 7 days | Discharge: HOME | DRG: 202 | End: 2019-09-03
Attending: HOSPITALIST | Admitting: HOSPITALIST
Payer: MEDICARE

## 2019-08-26 VITALS
RESPIRATION RATE: 16 BRPM | HEIGHT: 67 IN | TEMPERATURE: 98 F | WEIGHT: 136.91 LBS | SYSTOLIC BLOOD PRESSURE: 138 MMHG | HEART RATE: 93 BPM | DIASTOLIC BLOOD PRESSURE: 76 MMHG | OXYGEN SATURATION: 96 %

## 2019-08-26 DIAGNOSIS — R21 RASH AND OTHER NONSPECIFIC SKIN ERUPTION: ICD-10-CM

## 2019-08-26 DIAGNOSIS — K62.5 HEMORRHAGE OF ANUS AND RECTUM: Chronic | ICD-10-CM

## 2019-08-26 DIAGNOSIS — C19 MALIGNANT NEOPLASM OF RECTOSIGMOID JUNCTION: ICD-10-CM

## 2019-08-26 DIAGNOSIS — J45.50 SEVERE PERSISTENT ASTHMA, UNCOMPLICATED: ICD-10-CM

## 2019-08-26 DIAGNOSIS — Z98.890 OTHER SPECIFIED POSTPROCEDURAL STATES: Chronic | ICD-10-CM

## 2019-08-26 DIAGNOSIS — Z98.89 OTHER SPECIFIED POSTPROCEDURAL STATES: Chronic | ICD-10-CM

## 2019-08-26 DIAGNOSIS — H05.352 EXOSTOSIS OF LEFT ORBIT: Chronic | ICD-10-CM

## 2019-08-26 DIAGNOSIS — I35.1 NONRHEUMATIC AORTIC (VALVE) INSUFFICIENCY: ICD-10-CM

## 2019-08-26 DIAGNOSIS — B34.9 VIRAL INFECTION, UNSPECIFIED: ICD-10-CM

## 2019-08-26 DIAGNOSIS — R06.00 DYSPNEA, UNSPECIFIED: ICD-10-CM

## 2019-08-26 DIAGNOSIS — I48.91 UNSPECIFIED ATRIAL FIBRILLATION: ICD-10-CM

## 2019-08-26 DIAGNOSIS — E11.9 TYPE 2 DIABETES MELLITUS WITHOUT COMPLICATIONS: ICD-10-CM

## 2019-08-26 DIAGNOSIS — Z29.9 ENCOUNTER FOR PROPHYLACTIC MEASURES, UNSPECIFIED: ICD-10-CM

## 2019-08-26 DIAGNOSIS — Z96.653 PRESENCE OF ARTIFICIAL KNEE JOINT, BILATERAL: Chronic | ICD-10-CM

## 2019-08-26 DIAGNOSIS — Z87.09 PERSONAL HISTORY OF OTHER DISEASES OF THE RESPIRATORY SYSTEM: Chronic | ICD-10-CM

## 2019-08-26 LAB
ALBUMIN SERPL ELPH-MCNC: 4 G/DL — SIGNIFICANT CHANGE UP (ref 3.3–5)
ALP SERPL-CCNC: 115 U/L — SIGNIFICANT CHANGE UP (ref 40–120)
ALT FLD-CCNC: 9 U/L — LOW (ref 10–45)
ANION GAP SERPL CALC-SCNC: 11 MMOL/L — SIGNIFICANT CHANGE UP (ref 5–17)
APTT BLD: 39.4 SEC — HIGH (ref 27.5–36.3)
AST SERPL-CCNC: 10 U/L — SIGNIFICANT CHANGE UP (ref 10–40)
BASE EXCESS BLDV CALC-SCNC: 3.6 MMOL/L — HIGH (ref -2–2)
BASOPHILS # BLD AUTO: 0 K/UL — SIGNIFICANT CHANGE UP (ref 0–0.2)
BASOPHILS NFR BLD AUTO: 0.5 % — SIGNIFICANT CHANGE UP (ref 0–2)
BILIRUB SERPL-MCNC: 0.3 MG/DL — SIGNIFICANT CHANGE UP (ref 0.2–1.2)
BUN SERPL-MCNC: 7 MG/DL — SIGNIFICANT CHANGE UP (ref 7–23)
CA-I SERPL-SCNC: 1.16 MMOL/L — SIGNIFICANT CHANGE UP (ref 1.12–1.3)
CALCIUM SERPL-MCNC: 9.3 MG/DL — SIGNIFICANT CHANGE UP (ref 8.4–10.5)
CHLORIDE BLDV-SCNC: 107 MMOL/L — SIGNIFICANT CHANGE UP (ref 96–108)
CHLORIDE SERPL-SCNC: 104 MMOL/L — SIGNIFICANT CHANGE UP (ref 96–108)
CK MB CFR SERPL CALC: 1.7 NG/ML — SIGNIFICANT CHANGE UP (ref 0–3.8)
CK SERPL-CCNC: 87 U/L — SIGNIFICANT CHANGE UP (ref 25–170)
CO2 BLDV-SCNC: 31 MMOL/L — HIGH (ref 22–30)
CO2 SERPL-SCNC: 27 MMOL/L — SIGNIFICANT CHANGE UP (ref 22–31)
CREAT SERPL-MCNC: 0.76 MG/DL — SIGNIFICANT CHANGE UP (ref 0.5–1.3)
EOSINOPHIL # BLD AUTO: 0.3 K/UL — SIGNIFICANT CHANGE UP (ref 0–0.5)
EOSINOPHIL NFR BLD AUTO: 5.3 % — SIGNIFICANT CHANGE UP (ref 0–6)
GAS PNL BLDV: 138 MMOL/L — SIGNIFICANT CHANGE UP (ref 135–145)
GAS PNL BLDV: SIGNIFICANT CHANGE UP
GLUCOSE BLDV-MCNC: 109 MG/DL — HIGH (ref 70–99)
GLUCOSE SERPL-MCNC: 112 MG/DL — HIGH (ref 70–99)
HCO3 BLDV-SCNC: 29 MMOL/L — SIGNIFICANT CHANGE UP (ref 21–29)
HCT VFR BLD CALC: 41.4 % — SIGNIFICANT CHANGE UP (ref 34.5–45)
HCT VFR BLDA CALC: 39 % — SIGNIFICANT CHANGE UP (ref 39–50)
HGB BLD CALC-MCNC: 12.8 G/DL — SIGNIFICANT CHANGE UP (ref 11.5–15.5)
HGB BLD-MCNC: 12.6 G/DL — SIGNIFICANT CHANGE UP (ref 11.5–15.5)
INR BLD: 1.17 RATIO — HIGH (ref 0.88–1.16)
LACTATE BLDV-MCNC: 1.3 MMOL/L — SIGNIFICANT CHANGE UP (ref 0.7–2)
LYMPHOCYTES # BLD AUTO: 1.8 K/UL — SIGNIFICANT CHANGE UP (ref 1–3.3)
LYMPHOCYTES # BLD AUTO: 27.9 % — SIGNIFICANT CHANGE UP (ref 13–44)
MCHC RBC-ENTMCNC: 23 PG — LOW (ref 27–34)
MCHC RBC-ENTMCNC: 30.4 GM/DL — LOW (ref 32–36)
MCV RBC AUTO: 75.8 FL — LOW (ref 80–100)
MONOCYTES # BLD AUTO: 0.9 K/UL — SIGNIFICANT CHANGE UP (ref 0–0.9)
MONOCYTES NFR BLD AUTO: 13.7 % — SIGNIFICANT CHANGE UP (ref 2–14)
NEUTROPHILS # BLD AUTO: 3.3 K/UL — SIGNIFICANT CHANGE UP (ref 1.8–7.4)
NEUTROPHILS NFR BLD AUTO: 52.6 % — SIGNIFICANT CHANGE UP (ref 43–77)
NT-PROBNP SERPL-SCNC: 58 PG/ML — SIGNIFICANT CHANGE UP (ref 0–300)
OTHER CELLS CSF MANUAL: 10 ML/DL — LOW (ref 18–22)
PCO2 BLDV: 51 MMHG — HIGH (ref 35–50)
PH BLDV: 7.38 — SIGNIFICANT CHANGE UP (ref 7.35–7.45)
PLATELET # BLD AUTO: 246 K/UL — SIGNIFICANT CHANGE UP (ref 150–400)
PO2 BLDV: 32 MMHG — SIGNIFICANT CHANGE UP (ref 25–45)
POTASSIUM BLDV-SCNC: 3.5 MMOL/L — SIGNIFICANT CHANGE UP (ref 3.5–5.3)
POTASSIUM SERPL-MCNC: 3.8 MMOL/L — SIGNIFICANT CHANGE UP (ref 3.5–5.3)
POTASSIUM SERPL-SCNC: 3.8 MMOL/L — SIGNIFICANT CHANGE UP (ref 3.5–5.3)
PROT SERPL-MCNC: 7 G/DL — SIGNIFICANT CHANGE UP (ref 6–8.3)
PROTHROM AB SERPL-ACNC: 13.5 SEC — HIGH (ref 10–12.9)
RAPID RVP RESULT: DETECTED
RBC # BLD: 5.47 M/UL — HIGH (ref 3.8–5.2)
RBC # FLD: 14.8 % — HIGH (ref 10.3–14.5)
RV+EV RNA SPEC QL NAA+PROBE: DETECTED
SAO2 % BLDV: 55 % — LOW (ref 67–88)
SODIUM SERPL-SCNC: 142 MMOL/L — SIGNIFICANT CHANGE UP (ref 135–145)
TROPONIN T, HIGH SENSITIVITY RESULT: 9 NG/L — SIGNIFICANT CHANGE UP (ref 0–51)
WBC # BLD: 6.3 K/UL — SIGNIFICANT CHANGE UP (ref 3.8–10.5)
WBC # FLD AUTO: 6.3 K/UL — SIGNIFICANT CHANGE UP (ref 3.8–10.5)

## 2019-08-26 PROCEDURE — 71045 X-RAY EXAM CHEST 1 VIEW: CPT | Mod: 26

## 2019-08-26 PROCEDURE — 93010 ELECTROCARDIOGRAM REPORT: CPT

## 2019-08-26 PROCEDURE — 99285 EMERGENCY DEPT VISIT HI MDM: CPT

## 2019-08-26 PROCEDURE — 99223 1ST HOSP IP/OBS HIGH 75: CPT | Mod: GC

## 2019-08-26 PROCEDURE — 99223 1ST HOSP IP/OBS HIGH 75: CPT

## 2019-08-26 RX ORDER — APIXABAN 2.5 MG/1
5 TABLET, FILM COATED ORAL EVERY 12 HOURS
Refills: 0 | Status: DISCONTINUED | OUTPATIENT
Start: 2019-08-26 | End: 2019-09-03

## 2019-08-26 RX ORDER — DEXTROSE 50 % IN WATER 50 %
25 SYRINGE (ML) INTRAVENOUS ONCE
Refills: 0 | Status: DISCONTINUED | OUTPATIENT
Start: 2019-08-26 | End: 2019-09-03

## 2019-08-26 RX ORDER — MONTELUKAST 4 MG/1
10 TABLET, CHEWABLE ORAL DAILY
Refills: 0 | Status: DISCONTINUED | OUTPATIENT
Start: 2019-08-26 | End: 2019-09-03

## 2019-08-26 RX ORDER — IPRATROPIUM/ALBUTEROL SULFATE 18-103MCG
3 AEROSOL WITH ADAPTER (GRAM) INHALATION EVERY 6 HOURS
Refills: 0 | Status: DISCONTINUED | OUTPATIENT
Start: 2019-08-26 | End: 2019-09-03

## 2019-08-26 RX ORDER — GLUCAGON INJECTION, SOLUTION 0.5 MG/.1ML
1 INJECTION, SOLUTION SUBCUTANEOUS ONCE
Refills: 0 | Status: DISCONTINUED | OUTPATIENT
Start: 2019-08-26 | End: 2019-09-03

## 2019-08-26 RX ORDER — ATORVASTATIN CALCIUM 80 MG/1
40 TABLET, FILM COATED ORAL AT BEDTIME
Refills: 0 | Status: DISCONTINUED | OUTPATIENT
Start: 2019-08-26 | End: 2019-09-03

## 2019-08-26 RX ORDER — HYDROCORTISONE 1 %
1 OINTMENT (GRAM) TOPICAL
Refills: 0 | Status: DISCONTINUED | OUTPATIENT
Start: 2019-08-26 | End: 2019-09-03

## 2019-08-26 RX ORDER — INSULIN LISPRO 100/ML
VIAL (ML) SUBCUTANEOUS
Refills: 0 | Status: DISCONTINUED | OUTPATIENT
Start: 2019-08-26 | End: 2019-09-03

## 2019-08-26 RX ORDER — INSULIN LISPRO 100/ML
VIAL (ML) SUBCUTANEOUS AT BEDTIME
Refills: 0 | Status: DISCONTINUED | OUTPATIENT
Start: 2019-08-26 | End: 2019-09-03

## 2019-08-26 RX ORDER — DEXTROSE 50 % IN WATER 50 %
15 SYRINGE (ML) INTRAVENOUS ONCE
Refills: 0 | Status: DISCONTINUED | OUTPATIENT
Start: 2019-08-26 | End: 2019-09-03

## 2019-08-26 RX ORDER — BUDESONIDE, MICRONIZED 100 %
0.5 POWDER (GRAM) MISCELLANEOUS DAILY
Refills: 0 | Status: DISCONTINUED | OUTPATIENT
Start: 2019-08-26 | End: 2019-09-03

## 2019-08-26 RX ORDER — PANTOPRAZOLE SODIUM 20 MG/1
40 TABLET, DELAYED RELEASE ORAL
Refills: 0 | Status: DISCONTINUED | OUTPATIENT
Start: 2019-08-26 | End: 2019-09-03

## 2019-08-26 RX ORDER — IPRATROPIUM/ALBUTEROL SULFATE 18-103MCG
3 AEROSOL WITH ADAPTER (GRAM) INHALATION ONCE
Refills: 0 | Status: COMPLETED | OUTPATIENT
Start: 2019-08-26 | End: 2019-08-26

## 2019-08-26 RX ORDER — TIOTROPIUM BROMIDE 18 UG/1
1 CAPSULE ORAL; RESPIRATORY (INHALATION) DAILY
Refills: 0 | Status: DISCONTINUED | OUTPATIENT
Start: 2019-08-26 | End: 2019-09-03

## 2019-08-26 RX ORDER — DEXTROSE 50 % IN WATER 50 %
12.5 SYRINGE (ML) INTRAVENOUS ONCE
Refills: 0 | Status: DISCONTINUED | OUTPATIENT
Start: 2019-08-26 | End: 2019-09-03

## 2019-08-26 RX ORDER — ACETAMINOPHEN 500 MG
650 TABLET ORAL EVERY 6 HOURS
Refills: 0 | Status: DISCONTINUED | OUTPATIENT
Start: 2019-08-26 | End: 2019-09-03

## 2019-08-26 RX ORDER — INSULIN GLARGINE 100 [IU]/ML
10 INJECTION, SOLUTION SUBCUTANEOUS EVERY MORNING
Refills: 0 | Status: DISCONTINUED | OUTPATIENT
Start: 2019-08-26 | End: 2019-09-03

## 2019-08-26 RX ORDER — SODIUM CHLORIDE 9 MG/ML
1000 INJECTION, SOLUTION INTRAVENOUS
Refills: 0 | Status: DISCONTINUED | OUTPATIENT
Start: 2019-08-26 | End: 2019-09-03

## 2019-08-26 RX ORDER — DIGOXIN 250 MCG
0.25 TABLET ORAL DAILY
Refills: 0 | Status: DISCONTINUED | OUTPATIENT
Start: 2019-08-26 | End: 2019-09-03

## 2019-08-26 RX ADMIN — APIXABAN 5 MILLIGRAM(S): 2.5 TABLET, FILM COATED ORAL at 18:52

## 2019-08-26 RX ADMIN — Medication 1 APPLICATION(S): at 22:41

## 2019-08-26 RX ADMIN — Medication 3 MILLILITER(S): at 21:02

## 2019-08-26 RX ADMIN — Medication 3 MILLILITER(S): at 13:00

## 2019-08-26 RX ADMIN — Medication 40 MILLIGRAM(S): at 13:22

## 2019-08-26 RX ADMIN — Medication 0.25 MILLIGRAM(S): at 18:52

## 2019-08-26 RX ADMIN — MONTELUKAST 10 MILLIGRAM(S): 4 TABLET, CHEWABLE ORAL at 22:03

## 2019-08-26 RX ADMIN — Medication 3 MILLILITER(S): at 12:45

## 2019-08-26 RX ADMIN — Medication 3 MILLILITER(S): at 12:30

## 2019-08-26 RX ADMIN — ATORVASTATIN CALCIUM 40 MILLIGRAM(S): 80 TABLET, FILM COATED ORAL at 22:41

## 2019-08-26 RX ADMIN — Medication 4: at 18:52

## 2019-08-26 NOTE — ED PROVIDER NOTE - PSH
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  H/O pelvic surgery  5 years ago - s/p fracture  H/O total knee replacement, bilateral  5 years ago  History of partial hysterectomy  30 years ago - fibroids  History of sinus surgery  multiple sinus surgeries  History of tracheomalacia  2015 - attempted tracheal stenting ([facility])- course complicated by obstruction, respiratory failure, multiple CPR attempts -  stent discontinued; 10/20/2016 Tracheobronchoplasty (Prolene Mesh) performed at [facility] by Dr Zapien  Rectal bleeding  exam under anesthesia ([facility]) 2/2018  S/P bronchoscopy  6/5/2018 - [facility] (Dr Zapien) no evidence of tracheobronchomalacia in trachea or bronchial tubes
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
## 2019-08-26 NOTE — H&P ADULT - NSHPREVIEWOFSYSTEMS_GEN_ALL_CORE
REVIEW OF SYSTEMS:    CONSTITUTIONAL: No weakness, fevers or chills  EYES/ENT: No visual changes;  no throat pain   NECK: No pain or stiffness  RESPIRATORY: + cough, +wheezing, no hemoptysis; +shortness of breath  CARDIOVASCULAR: No chest pain or palpitations  GASTROINTESTINAL: No abdominal or epigastric pain. No nausea, vomiting, or hematemesis; No diarrhea or constipation. No melena or hematochezia.  GENITOURINARY: No dysuria, change in frequency or hematuria  NEUROLOGICAL: No numbness or weakness  SKIN: No itching, burning. +vesicular rash on right trunk   All other review of systems is negative unless indicated above.

## 2019-08-26 NOTE — H&P ADULT - PROBLEM SELECTOR PLAN 4
-pt EKG with p-wave inversions in V5, II, this abnormality is seen in previous EKG from 4/2019  -TTE 8/19: pt diastolic dysfunction, LA dilitation and moderate-severe AR, AR apparently increased from prior report in 3/2018 that mentioned moderate AR  -consider cardiology evaluation, structural heart team see if any intervention would be indicated  -f/u cardiac enzymes -pt EKG with p-wave inversions in V5, II, this abnormality is seen in previous EKG from 4/2019  -TTE 8/19: pt diastolic dysfunction, LA dilitation and moderate-severe AR, AR apparently increased from prior report in 3/2018 that mentioned moderate AR  -consider cardiology evaluation, structural heart team see if any intervention would be indicated in setting of worsening AR.   -f/u cardiac enzymes and BNP.  -Strict I's/O's and daily weights.

## 2019-08-26 NOTE — H&P ADULT - PROBLEM SELECTOR PLAN 2
-as above, viral respiratory infection in setting of pt with severe asthma  -ctm as described  -r/o cardiac pathology  -pt EKG with p-wave inversions in V5, II, this abnormality is seen in previous EKG from 4/2019  -TTE 8/19: pt diastolic dysfunction, LA dilitation and moderate-severe AR, AR apparently increased from prior report in 3/2018 that mentioned moderate AR  -consider cardiology evaluation, structural heart team see if any intervention would be indicated  -f/u cardiac enzymes -as above, viral respiratory infection in setting of pt with severe asthma  -ctm as described  -unlikely lung nodules are cause of SOB, CT chest stable, compared to 2009 study  -r/o cardiac pathology  -pt EKG with p-wave inversions in V5, II, this abnormality is seen in previous EKG from 4/2019  -TTE 8/19: pt diastolic dysfunction, LA dilitation and moderate-severe AR, AR apparently increased from prior report in 3/2018 that mentioned moderate AR  -consider cardiology evaluation, structural heart team see if any intervention would be indicated  -f/u cardiac enzymes -as above, viral respiratory infection in setting of pt with severe asthma  -ctm as described  -unlikely lung nodules are cause of SOB, CT chest stable, compared to 2009 study  -r/o cardiac pathology  -pt EKG with p-wave inversions in V5, II, this abnormality is seen in previous EKG from 4/2019  -TTE 8/19: pt diastolic dysfunction, LA dilitation and moderate-severe AR, AR apparently increased from prior report in 3/2018 that mentioned moderate AR  -consider cardiology evaluation, structural heart team see if any intervention would be indicated in setting of worsening AR.   -f/u cardiac enzymes and BNP.  -Patient has an old Chemoport in place, which could possibly be a cause of thromboembolic events/infection. Consider removal if no longer clinically indicated.

## 2019-08-26 NOTE — ED PROVIDER NOTE - CLINICAL SUMMARY MEDICAL DECISION MAKING FREE TEXT BOX
68yof pmhx rectal cancer, long standing hx of Asthma on chronic steroids, recent CT scan of the chest with pulmonary nodules, which apparently are decreased compared to prior. Pt coming in increased sob, khan and wheezing. No fever or chills, No dizziness. Spoke to Dr. Allison- request admission to hospitalist. ZR

## 2019-08-26 NOTE — H&P ADULT - PROBLEM SELECTOR PLAN 3
-as above  -c/w supplemental oxygen, 2L via nasal cannula, wean off as tolerated  -c/w singulair, medrol, budenoside, spiriva and PRN duonebs  -patient on performist and breo at home, we do not carry those medications  -follows with Dr. Allison for pulmonology  -f/u pulmonology reccs -as above  -c/w supplemental oxygen, 2L via nasal cannula, wean off as tolerated  -c/w singulair, prednisone, budesonide, spiriva and PRN duonebs  -patient on performist and breo at home, we do not carry those medications  -follows with Dr. Allison for pulmonology  -f/u pulmonology reccs  -S/p IV Solumedrol 40mg in ED. -C/w Prednisone 40mg x 5 days. Afterwards resume home Medrol dose.  -Sputum culture.

## 2019-08-26 NOTE — H&P ADULT - NSHPLABSRESULTS_GEN_ALL_CORE
12.6   6.3   )-----------( 246      ( 26 Aug 2019 12:13 )             41.4       08-26    142  |  104  |  7   ----------------------------<  112<H>  3.8   |  27  |  0.76    Ca    9.3      26 Aug 2019 12:13    TPro  7.0  /  Alb  4.0  /  TBili  0.3  /  DBili  x   /  AST  10  /  ALT  9<L>  /  AlkPhos  115  08-26                  PT/INR - ( 26 Aug 2019 12:14 )   PT: 13.5 sec;   INR: 1.17 ratio         PTT - ( 26 Aug 2019 12:14 )  PTT:39.4 sec    Lactate Trend    Cultures: pending    RVP positive for enterorhinovirus    Radiology: CXR: clear lungs  CT chest  Previously noted right lower lobe peripheral nodule measures 1.6 x 1.1 cm   and has slightly decreased in size when compared with previous exam.    A right middle lobe peripheral pleural-based opacity measures 2.7 x 0.9   cm and is unchanged. Minimal areas of bronchial thickening in the right   middle lobe and bilateral lower lobes. A right basilar nodule measuring   1.0 cm is unchanged. No new nodules.    V/Q Scan  Very low probability of pulmonary embolus. Interval resolution of matched   ventilation/perfusion defect involving the posterior basal segment of the   left lower lobe as compared to VQ scan from 3/29/2018.          EKG:   NORMAL SINUS RHYTHM  SEPTAL INFARCT , AGE UNDETERMINED  ST & T WAVE ABNORMALITY, CONSIDER LATERAL ISCHEMIA 12.6   6.3   )-----------( 246      ( 26 Aug 2019 12:13 )             41.4       08-26    142  |  104  |  7   ----------------------------<  112<H>  3.8   |  27  |  0.76    Ca    9.3      26 Aug 2019 12:13    TPro  7.0  /  Alb  4.0  /  TBili  0.3  /  DBili  x   /  AST  10  /  ALT  9<L>  /  AlkPhos  115  08-26                  PT/INR - ( 26 Aug 2019 12:14 )   PT: 13.5 sec;   INR: 1.17 ratio         PTT - ( 26 Aug 2019 12:14 )  PTT:39.4 sec    Lactate Trend    Cultures: pending    RVP positive for enterorhinovirus    Radiology reviewed by me: CXR: clear lungs  CT chest  Previously noted right lower lobe peripheral nodule measures 1.6 x 1.1 cm   and has slightly decreased in size when compared with previous exam.    A right middle lobe peripheral pleural-based opacity measures 2.7 x 0.9   cm and is unchanged. Minimal areas of bronchial thickening in the right   middle lobe and bilateral lower lobes. A right basilar nodule measuring   1.0 cm is unchanged. No new nodules.    V/Q Scan  Very low probability of pulmonary embolus. Interval resolution of matched   ventilation/perfusion defect involving the posterior basal segment of the   left lower lobe as compared to VQ scan from 3/29/2018.          EKG reviewed by me:   NORMAL SINUS RHYTHM  SEPTAL INFARCT , AGE UNDETERMINED  ST & T WAVE ABNORMALITY, CONSIDER LATERAL ISCHEMIA

## 2019-08-26 NOTE — ED PROVIDER NOTE - OBJECTIVE STATEMENT
68yof pmhx of Asthma on chronic steroids for 40+years, adrenal insufficiency, DM, TIA, Colorectal CA s/p chemo/radiation 2017, tracheobronchomalacia here with persistent sob and khan for weeks. Pt reports worsening over the weekend and now very dyspneic with mild activity. called EMS and found to be wheezing and give neb treatement. Pt reports outpt echo, CT chest (with lung nodules), and VQ scan- low prob for PE. Spoke to Dr. Allison today and sent for admission for possible workup.

## 2019-08-26 NOTE — ED PROVIDER NOTE - PHYSICAL EXAMINATION
Gen: AAO x 3, NAD  Skin: No rashes or lesions  HEENT: NC/AT, PERRLA, EOMI, MMM  Resp: mild tachypnea, coarse breath sounds and diffuse wheezing   Cardiac: rrr s1s2, no murmurs, rubs or gallops  GI: ND, +BS, Soft, NT  Ext: no pedal edema, FROM in all extremities  Neuro: no focal deficits

## 2019-08-26 NOTE — H&P ADULT - ATTENDING COMMENTS
-Patient seen/examined on 8/26/19. Case/plan discussed with Dr. Jacobson as reviewed/edited by me above and in any comments below.

## 2019-08-26 NOTE — H&P ADULT - NSICDXFAMILYHX_GEN_ALL_CORE_FT
FAMILY HISTORY:  Family history of asthma  Family history of diabetes mellitus type II    Sibling  Still living? Unknown  Family history of breast cancer, Age at diagnosis: Age Unknown

## 2019-08-26 NOTE — H&P ADULT - ASSESSMENT
The patient is a 69 y/o woman with asthma (on long term medrol) c/b adrenal insufficiency, afib on eliquis, DM2, TIA, colon cancer (s/p resection, chemo, RT), tracheobronchomalacia s/p bronchial thermoplasty who presented to the ED with SOB and dyspnea on exertion, found to have enterorhinovirus. R/o additional cardiac pathology.

## 2019-08-26 NOTE — H&P ADULT - PROBLEM SELECTOR PROBLEM 7
- Supplemental oxygen prn, keep O2 sats > 88%  - Scheduled inhaled medications       Type 2 diabetes mellitus without complication, with long-term current use of insulin

## 2019-08-26 NOTE — H&P ADULT - PROBLEM SELECTOR PLAN 1
-pt with RVP +enterorhinovirus  -supportive care  -no need for empiric Abx at this time, hemodynamically stable, afebrile, no leukocytosis  -c/w supplemental oxygen, 2L via nasal cannula, wean off as tolerated  -c/w singulair, medrol, budenoside, spiriva and PRN duonebs  -patient on performist and breo at home, we do not carry those medications  -follows with Dr. Allison for pulmonology  -f/u pulmonology reccs -pt with RVP +enterorhinovirus  -supportive care  -no need for empiric Abx at this time, hemodynamically stable, afebrile, no leukocytosis  -c/w supplemental oxygen, 2L via nasal cannula, wean off as tolerated  -c/w singulair, budesonide, spiriva and duonebs Q6H  -patient on performist and breo at home, we do not carry those medications  -follows with Dr. Allison for pulmonology  -f/u pulmonology reccs  -S/p IV Solumedrol 40mg in ED. -C/w Prednisone 40mg x 5 days. Afterwards resume home Medrol dose.  -Chest PT/Acapella/incentive spirometer.

## 2019-08-26 NOTE — H&P ADULT - NSICDXPASTMEDICALHX_GEN_ALL_CORE_FT
PAST MEDICAL HISTORY:  Adrenal insufficiency Medrol daily for over 50 years    Aortic insufficiency moderate AR on echo 5/3/2018    Asthma     Atrial fibrillation paroxysmal, on eliquis    Colorectal cancer 4/2018- last treatment , chemo and radiation    COPD (chronic obstructive pulmonary disease)     Diabetes Type 2    DVT (deep venous thrombosis) 15-20 years ago, took coumadin    Pelvic fracture     Rectal bleeding     Seizure x 1 1/7/18    TIA (transient ischemic attack) multiple, last 5 years ago - presents as right-sided weakness    Tracheobronchomalacia diagnosed 2015, s/p bronchial thermoplasty 2016 (Dr Zapien); recent bronchoscopy 6/5/2018 revealed no evidence of tracheobronchomalacia in trachea or bronchial tubes

## 2019-08-26 NOTE — ED ADULT NURSE NOTE - ED STAT RN HANDOFF DETAILS
hand off given to oncoming RN's Diana Ordaz and Mukesh Montesinos. Pt resting quietly at present. Awaiting bed. TBA. No beds available

## 2019-08-26 NOTE — H&P ADULT - NSHPPHYSICALEXAM_GEN_ALL_CORE
GENERAL: NAD, nasal cannula in place  HEENT: right eye PERRL, left eye prosthetic, proptosis, MMM, white exudates in the posterior pharynx  Pulm: normal work of breathing, mild expiratory wheezing R>>L, mildly coarse breath sounds b/l  CV: RRR, S1&S2+, no m/r/g appreciated, distant  ABDOMEN: soft, nt, nd, no hepatosplenomegaly. vesicular rash on right trunk, does not cross midline, extends from right of midline to axilla in T-8-T-9 dermatome distribution  MSK: nl ROM  EXTREMITIES:  no appreciable edema in b/l LE  Neuro: A&Ox3, no focal deficits  SKIN: warm and dry, no visible rash GENERAL: NAD, nasal cannula in place  HEENT: right eye PERRL, left eye prosthetic, proptosis, MMM, white exudates in the posterior pharynx  Pulm: normal work of breathing, mild expiratory wheezing R>>L, mildly coarse breath sounds b/l  CV: RRR, S1&S2+, no m/r/g appreciated, distant  ABDOMEN: soft, nt, nd, no hepatosplenomegaly. vesicular rash on right trunk, does not cross midline, extends from right of midline to axilla in T-8-T-9 dermatome distribution  MSK: nl ROM  EXTREMITIES:  no appreciable edema in b/l LE  Neuro: A&Ox3, no focal deficits  SKIN: warm and dry. Visible rash on abdomen as above. GENERAL: NAD, nasal cannula in place  HEENT: right eye PERRL, left eye prosthetic, proptosis, MMM, white exudates in the posterior pharynx  Pulm: normal work of breathing, mild expiratory wheezing R>>L, mildly coarse breath sounds b/l  CV: RRR, S1&S2+, no m/r/g appreciated, distant, chemoport in right anterior chest wall  ABDOMEN: soft, nt, nd, no hepatosplenomegaly. vesicular rash on right trunk, does not cross midline, extends from right of midline to axilla   MSK: nl ROM  EXTREMITIES:  no appreciable edema in b/l LE  Neuro: A&Ox3, no focal deficits  SKIN: warm and dry. Visible rash on abdomen as above.

## 2019-08-26 NOTE — ED ADULT NURSE NOTE - OBJECTIVE STATEMENT
Patient came in via ambulance with c/o asthma attack since last night. Patient alert and orientedX4. no distress at this moment. Breathing easy and non labored at this time. Pt's skin warm and dry to touch. No chills. No diaphoresis. Pt able to move all extremities. Pt stated she has a port because she has hx: colorectal cancer. Emotional support offered.

## 2019-08-26 NOTE — ED ADULT NURSE NOTE - ED STAT RN HANDOFF DETAILS 2
Report received from covering RN Nura. Blood work was sent. Port intact right chest. Loose cough audible/wheezing. Droplet isolation initiated

## 2019-08-26 NOTE — H&P ADULT - NSICDXPASTSURGICALHX_GEN_ALL_CORE_FT
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    H/O pelvic surgery 5 years ago - s/p fracture    H/O total knee replacement, bilateral 5 years ago    History of partial hysterectomy 30 years ago - fibroids    History of sinus surgery multiple sinus surgeries    History of tracheomalacia 2015 - attempted tracheal stenting ([facility])- course complicated by obstruction, respiratory failure, multiple CPR attempts -  stent discontinued; 10/20/2016 Tracheobronchoplasty (Prolene Mesh) performed at [facility] by Dr Zapien    Rectal bleeding exam under anesthesia ([facility]) 2/2018    S/P bronchoscopy 6/5/2018 - [facility] (Dr Zapien) no evidence of tracheobronchomalacia in trachea or bronchial tubes

## 2019-08-26 NOTE — H&P ADULT - PROBLEM SELECTOR PLAN 7
DM protocol, ISS, FSGs q6h, basal lantus 10u in the morning per patient, continue DM protocol, ISS, FSGs q6h, basal lantus 10u in the morning per patient, continue  -Consistent carbohydrate diet with Glucerna.  -C/w atorvastatin for HLD.

## 2019-08-26 NOTE — ED PROVIDER NOTE - NS ED ROS FT
Constitutional: No fever or chills  Eyes: No visual changes, eye pain or redness  HEENT: No throat pain, ear pain, nasal pain. No nose bleeding.  CV: No chest pain or lower extremity edema  Resp: +SOB +cough  GI: No abd pain. No nausea or vomiting. No diarrhea. No constipation.   : No dysuria, hematuria.   MSK: No musculoskeletal pain  Skin: No rash  Neuro: No headache. No numbness or tingling. No weakness.

## 2019-08-26 NOTE — ED PROVIDER NOTE - PROGRESS NOTE DETAILS
spoke to Dr. frazier and wants the pt admitted for further inpt workup and treatement of dyspnea. Reports Pulm team aware of the pt.

## 2019-08-26 NOTE — H&P ADULT - HISTORY OF PRESENT ILLNESS
The patient is a 69 y/o woman with asthma (on long term medrol) c/b adrenal insufficiency, afib on eliquis, DM2, TIA, colon cancer (s/p resection, chemo, RT), tracheobronchomalacia s/p bronchial thermoplasty who presented to the ED with SOB and dyspnea on exertion. Patient states this has been getting progressively worse for months. She had a V/Q scan as an outpatient as well as a CT chest. V/Q was low possibility of PE, CT chest showed two stable right lung nodules and a RML pleural opacity that is unchanged from last study. Patient reports cough, productive of yellowish sputum. No fever, chills, sick contacts. No nausea, vomiting, diarrhea, hematuria, dysuria, chest pain. No recent travel. Patient called EMS and recieved a nebulizer treatment en route to [facility].    In the ED, patient was given solumedrol 40 IV and duonebs. She has been admitted to medicine for further management.

## 2019-08-26 NOTE — CONSULT NOTE ADULT - SUBJECTIVE AND OBJECTIVE BOX
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    Last admitted 2/2019 acute hypoxic resp failure 2/2 coronovirus respiratory infection.       PAST MEDICAL & SURGICAL HISTORY:  TIA (transient ischemic attack): multiple, last 5 years ago - presents as right-sided weakness  DVT (deep venous thrombosis): 15-20 years ago, took coumadin  Seizure: x 1 1/7/18  Rectal bleeding  Colorectal cancer: 4/2018- last treatment , chemo and radiation  Tracheobronchomalacia: diagnosed 2015, s/p bronchial thermoplasty 2016 (Dr Zapien); recent bronchoscopy 6/5/2018 revealed no evidence of tracheobronchomalacia in trachea or bronchial tubes  Asthma  Pelvic fracture  Aortic insufficiency: moderate AR on echo 5/3/2018  Adrenal insufficiency: Medrol daily for over 50 years  COPD (chronic obstructive pulmonary disease)  Diabetes: Type 2  Atrial fibrillation: paroxysmal, on eliquis  Rectal bleeding: exam under anesthesia ([facility]U) 2/2018  S/P bronchoscopy: 6/5/2018 - [facility] (Dr Zapien) no evidence of tracheobronchomalacia in trachea or bronchial tubes  History of tracheomalacia: 2015 - attempted tracheal stenting (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  History of sinus surgery: multiple sinus surgeries  H/O total knee replacement, bilateral: 5 years ago  History of partial hysterectomy: 30 years ago - fibroids      FAMILY HISTORY:  Family history of diabetes mellitus type II  Family history of breast cancer (Sibling)  Family history of asthma      SOCIAL HISTORY:  Smoking: [ ] Never Smoked [ ] Former Smoker (__ packs x ___ years) [ ] Current Smoker  (__ packs x ___ years)  Substance Use: [ ] Never Used [ ] Used ____  EtOH Use:  Marital Status: [ ] Single [ ]  [ ]  [ ]   Occupation:  Recent Travel:  Country of Birth:  Advance Directives:    Allergies    ampicillin (Short breath)  aspirin (Short breath)  Avelox (Short breath; Pruritus)  codeine (Short breath)  Dilaudid (Short breath)  iodine (Short breath; Swelling)  penicillin (Short breath)  shellfish (Anaphylaxis)  tetanus toxoid (Short breath)  Valium (Short breath)    Intolerances        HOME MEDICATIONS:    REVIEW OF SYSTEMS:  Constitutional: [ ] fevers [ ] chills [ ] weight loss [ ] weight gain  HEENT: [ ] dry eyes [ ] eye irritation [ ] postnasal drip [ ] nasal congestion  CV: [ ] chest pain [ ] orthopnea [ ] palpitations [ ] murmur  Resp: [ ] cough [ ] shortness of breath [ ] wheezing [ ] sputum [ ] hemoptysis  GI: [ ] nausea [ ] vomiting [ ] diarrhea [ ] constipation [ ] abd pain [ ] dysphagia   : [ ] dysuria [ ] nocturia [ ] hematuria [ ] increased urinary frequency  Musculoskeletal: [ ] myalgias [ ] arthralgias   Skin: [ ] rash [ ] itch  Neurological: [ ] headache [ ] dizziness [ ] syncope [ ] weakness [ ] numbness  Psychiatric: [ ] anxiety [ ] depression  Endocrine: [ ] diabetes [ ] thyroid problem  Hematologic/Lymphatic: [ ] anemia [ ] bleeding problem  Allergic/Immunologic: [ ] itchy eyes [ ] nasal discharge [ ] hives [ ] angioedema  [ ] All other systems negative  [ ] Unable to assess ROS because ________    OBJECTIVE:  ICU Vital Signs Last 24 Hrs  T(C): 36.8 (26 Aug 2019 11:25), Max: 36.8 (26 Aug 2019 11:25)  T(F): 98.2 (26 Aug 2019 11:25), Max: 98.2 (26 Aug 2019 11:25)  HR: 82 (26 Aug 2019 11:40) (82 - 93)  BP: 103/79 (26 Aug 2019 11:40) (103/79 - 138/76)  BP(mean): --  ABP: --  ABP(mean): --  RR: 18 (26 Aug 2019 11:40) (16 - 18)  SpO2: 97% (26 Aug 2019 11:40) (96% - 97%)        CAPILLARY BLOOD GLUCOSE          PHYSICAL EXAM:  General:  NAD  HEENT:  NC/AT  Lymph Nodes: No cervical or supraclavicular lymphadenopathy   Neck: Supple  Respiratory:  CTA B/L, no wheezes, crackles or rhonchi  Cardiovascular:  RRR, no m/r/g  Abdomen: soft, NT/ND, +BS  Extremities: no clubbing, cyanosis or edema, warm  Skin: no rash  Neurological: AAOx3, no focal deficits  Psychiatry: not anxious, normal affect    HOSPITAL MEDICATIONS:  MEDICATIONS  (STANDING):    MEDICATIONS  (PRN):      LABS:    Hgb Trend: 12.8<--  08-26    142  |  104  |  7   ----------------------------<  112<H>  3.8   |  27  |  0.76    Ca    9.3      26 Aug 2019 12:13    TPro  7.0  /  Alb  4.0  /  TBili  0.3  /  DBili  x   /  AST  10  /  ALT  9<L>  /  AlkPhos  115  08-26    Creatinine Trend: 0.76<--  PT/INR - ( 26 Aug 2019 12:14 )   PT: 13.5 sec;   INR: 1.17 ratio         PTT - ( 26 Aug 2019 12:14 )  PTT:39.4 sec      Venous Blood Gas:  08-26 @ 12:13  7.38/51/32/29/55  VBG Lactate: 1.3      MICROBIOLOGY:     RADIOLOGY:  [x ] Reviewed and interpreted by me  < from: Xray Chest 1 View AP/PA (08.26.19 @ 12:22) >  EXAM:  XR CHEST AP OR PA 1V                            PROCEDURE DATE:  08/26/2019            INTERPRETATION:  CLINICAL INFORMATION: Cough, pneumonia.    A single portable view of the chest was obtained.     Comparison: 8/13/2019.     The mediastinal cardiac silhouette is unremarkable. Right Mediport   unchanged.    The lungs are clear.     No acute osseous finding.     IMPRESSION:    Clear lungs.    NATHANIEL BLAKE M.D., ATTENDING RADIOLOGIST  This document has been electronically signed. Aug 26 2019 12:26PM    < end of copied text >      < from: CT Chest No Cont (08.13.19 @ 12:36) >  EXAM:  CT CHEST        PROCEDURE DATE:  08/13/2019           INTERPRETATION:  Reason for Exam:  Bronchiectasis. Nodules    CT of the chest was performed from the thoracic inlet to the level of the   adrenal glands without contrast injection.    Comparison: June 21, 2019 and March 21, 2019    Tubes/Lines: Right-sided Mediport catheter seen with its tip in the SVC.      Mediastinum/Vessels/Heart: Ascending aorta measures up to 3.8 cm in   maximal dimension, unchanged. Trace pericardial fluid. No   lymphadenopathy. Thyroid gland is unremarkable    Lungs/Pleura/Airways: Previously noted right lower lobe peripheral nodule   measures 1.6 x 1.1 cm and has slightly decreased in size when compared   with previous exam.    A right middle lobe peripheral pleural-based opacity measures 2.7 x 0.9   cm and is unchanged. Minimal areas of bronchial thickening in the right   middle lobe and bilateral lower lobes. A right basilar nodule measuring   1.0 cm is unchanged. No new nodules.    Visualized abdomen: Unremarkable noncontrast appearance of the upper   abdomen    Bones and soft tissues: No suspicious osseous lesions. Degenerative   changes noted throughout the spine. Diffuse bony demineralization.    IMPRESSION:    When compared with June 21, 2009 examination:    Previously noted right lower lobe peripheral nodule measures 1.6 x 1.1 cm   and has slightly decreased in size when compared with previous exam.    A right middle lobe peripheral pleural-based opacity measures 2.7 x 0.9   cm and is unchanged. Minimal areas of bronchial thickening in the right   middle lobe and bilateral lower lobes. A right basilar nodule measuring   1.0 cm is unchanged. No new nodules.      MARAH YOO M.D., ATTENDING RADIOLOGIST   This document has been electronically signed. Aug 13 2019  1:40PM    < end of copied text >    PULMONARY FUNCTION TESTS: Spirometry 6/11/19: severe obstructive defect    EKG: NSR at 81bpm, T wave inversion in AVL.     < from: Transthoracic Echocardiogram (08.23.19 @ 15:29) >  PROCEDURE: Transthoracic echocardiogram with 2-D, M-Mode  and complete spectral and color flow Doppler.  INDICATION: Dyspnea, unspecified (R06.00)  ------------------------------------------------------------------------  Dimensions:    Normal Values:  LA:     3.8    2.0 - 4.0 cm  Ao:     3.9    2.0 - 3.8 cm  SEPTUM: 1.1    0.6 - 1.2 cm  PWT:    1.0    0.6 - 1.1 cm  LVIDd:  4.2    3.0 - 5.6 cm  LVIDs:  2.8    1.8 - 4.0 cm  Derived variables:  LVMI: 85 g/m2  RWT: 0.47  Fractional short: 33 %  EF (Visual Estimate): 60-65 %  Doppler Peak Velocity (m/sec): AoV=1.8  ------------------------------------------------------------------------  Observations:  Mitral Valve: Mitral annular calcification, otherwise  normal mitral valve. Minimal mitral regurgitation.  Aortic Valve/Aorta: Calcified trileaflet aortic valve with  normal opening. Peak transaortic valve gradient equals 13  mm Hg. Moderate-severe aortic regurgitation. Peak left  ventricular outflow tract gradient equals 7 mm Hg.  Aortic Root: 3.9 cm.  Left Atrium: Moderate left atrial enlargement.  Left Ventricle: Normal left ventricular systolic function.  No segmental wall motion abnormalities. Normal left  ventricular internal dimensions and wall thicknesses.  Reversal of the E-A  waves of the mitral inflow pattern is  consistent with diastolic LV dysfunction.  Right Heart: Normal right atrium.Normal right ventricular  size and function. Normal tricuspid valve. Minimal  tricuspid regurgitation. Normal pulmonic valve.  Pericardium/Pleura: Normal pericardium with no pericardial  effusion.  Hemodynamic: Estimated right atrial pressure is 8 mmHg.  Estimated right ventricular systolic pressure equals 26 mm  Hg, assuming right atrial pressure equals 8 mm Hg,  consistent with normal pulmonary pressures.  ------------------------------------------------------------------------  Conclusions:  1. Moderate-severe aortic regurgitation.  2. Moderate left atrial enlargement.  3. Normal left ventricular internal dimensions and wall  thicknesses.  4. Normal left ventricular systolic function. No segmental  wall motion abnormalities.  5. Reversal of the E-A  waves of the mitral inflow pattern  is consistent with diastolic LV dysfunction.  6. Normal right ventricular size and function.  *** Compared with echocardiogram of 3/12/2018, no  significant changes noted.  ------------------------------------------------------------------------  Confirmed on  8/23/2019 - 16:34:53 by Baron Tristan M.D.  ------------------------------------------------------------------------    < end of copied text >    < from: NM Pulmonary Ventilation/Perfusion Scan (08.13.19 @ 09:02) >  EXAM:  [facility] VENTILATION PERFUS IMG                                PROCEDURE DATE:  08/13/2019          INTERPRETATION:  RADIOPHARMACEUTICAL: 1 mCi Tc-99m-DTPA aerosol by   inhalation; 6 mCi Tc-99m-MAA, I.V.    CLINICAL STATEMENT: 68-year-old female with shortness of breath.    TECHNIQUE:  Ventilation and perfusion images of the lungs were obtained   following administration of Tc-99m-DTPA and Tc-99m-MAA. Images were   obtained in the anterior, posterior, both lateral, and all 4 oblique   projections. The study was interpreted in conjunction with chest   radiograph of 8/13/2019.    VQ scan from 3/29/2018    FINDINGS: Previously seen matched perfusion defect in the posterior basal   segment of the left lower lobe has resolved. Small matched defect in the   apical posterior segment of the left upper lobe is unchanged. There is   heterogeneous tracer distribution in the remainder of the lungs on both   the ventilation and the perfusion images. No new segmental perfusion   defects. There are no mismatched defects.    IMPRESSION:     Very low probability of pulmonary embolus. Interval resolution of matched   ventilation/perfusion defect involving the posterior basal segment of the   left lower lobe as compared to VQ scan from 3/29/2018.    BERNARDO SERNA M.D., NUCLEAR MEDICINE ATTENDING  This document has been electronically signed. Aug 13 2019  9:20AM        < end of copied text > [facility] [place] [facility] - Division of Pulmonary, Critical Care and Sleep Medicine   Please call [phone] between 8am-pm weekdays, [phone] after hours and weekends      CHIEF COMPLAINT:      HPI: 69 yo F with a h/o TBM s/p tracheo-bronchoplasty in 10/16 on chronic low dose Prednisone,  asthma, bronchiectasis, lung nodules, Afib on Eliquis, DM2, HTN, Squamous cell CA of the anus on chemo/XRT, s/p abdominoperineal proctectomy for recurrent exophytic anal cancer in 7/18 who presents with acute onset productive cough and worsening SOB.  Has been undergoing extensive work up over the past month for progressive KRAMER including recent TTE, V/Q scan and CT chest. Notable findings for right sided peripheral lung nodules, unchanged from prior CTs. Echo with mod-severe AR and moderately dilated LA, diastolic dysfunction, normal RSVP.   Most recent PFTs with evidence of severe obstructive lung disease.   Last admitted 2/2019 acute hypoxic resp failure 2/2 coronovirus respiratory infection.       PAST MEDICAL & SURGICAL HISTORY:  TIA (transient ischemic attack): multiple, last 5 years ago - presents as right-sided weakness  DVT (deep venous thrombosis): 15-20 years ago, took coumadin  Seizure: x 1 1/7/18  Rectal bleeding  Colorectal cancer: 4/2018- last treatment , chemo and radiation  Tracheobronchomalacia: diagnosed 2015, s/p bronchial thermoplasty 2016 (Dr Zapien); recent bronchoscopy 6/5/2018 revealed no evidence of tracheobronchomalacia in trachea or bronchial tubes  Asthma  Pelvic fracture  Aortic insufficiency: moderate AR on echo 5/3/2018  Adrenal insufficiency: Medrol daily for over 50 years  COPD (chronic obstructive pulmonary disease)  Diabetes: Type 2  Atrial fibrillation: paroxysmal, on eliquis  Rectal bleeding: exam under anesthesia ([facility]) 2/2018  S/P bronchoscopy: 6/5/2018 - [facility] (Dr Zapien) no evidence of tracheobronchomalacia in trachea or bronchial tubes  History of tracheomalacia: 2015 - attempted tracheal stenting ([facility])- course complicated by obstruction, respiratory failure, multiple CPR attempts -  stent discontinued; 10/20/2016 Tracheobronchoplasty (Prolene Mesh) performed at [facility] by Dr Zapien  H/O pelvic surgery: 5 years ago - s/p fracture  Exostosis of orbit, 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  History of sinus surgery: multiple sinus surgeries  H/O total knee replacement, bilateral: 5 years ago  History of partial hysterectomy: 30 years ago - fibroids      FAMILY HISTORY:  Family history of diabetes mellitus type II  Family history of breast cancer (Sibling)  Family history of asthma      SOCIAL HISTORY:  Smoking: [ ] Never Smoked [ ] Former Smoker (__ packs x ___ years) [ ] Current Smoker  (__ packs x ___ years)  Substance Use: [ ] Never Used [ ] Used ____  EtOH Use:  Marital Status: [ ] Single [ ]  [ ]  [ ]   Occupation:  Recent Travel:  Country of Birth:  Advance Directives:    Allergies    ampicillin (Short breath)  aspirin (Short breath)  Avelox (Short breath; Pruritus)  codeine (Short breath)  Dilaudid (Short breath)  iodine (Short breath; Swelling)  penicillin (Short breath)  shellfish (Anaphylaxis)  tetanus toxoid (Short breath)  Valium (Short breath)    Intolerances        HOME MEDICATIONS:    REVIEW OF SYSTEMS:  Constitutional: [ ] fevers [ ] chills [ ] weight loss [ ] weight gain  HEENT: [ ] dry eyes [ ] eye irritation [ ] postnasal drip [ ] nasal congestion  CV: [ ] chest pain [ ] orthopnea [ ] palpitations [ ] murmur  Resp: [ ] cough [ ] shortness of breath [ ] wheezing [ ] sputum [ ] hemoptysis  GI: [ ] nausea [ ] vomiting [ ] diarrhea [ ] constipation [ ] abd pain [ ] dysphagia   : [ ] dysuria [ ] nocturia [ ] hematuria [ ] increased urinary frequency  Musculoskeletal: [ ] myalgias [ ] arthralgias   Skin: [ ] rash [ ] itch  Neurological: [ ] headache [ ] dizziness [ ] syncope [ ] weakness [ ] numbness  Psychiatric: [ ] anxiety [ ] depression  Endocrine: [ ] diabetes [ ] thyroid problem  Hematologic/Lymphatic: [ ] anemia [ ] bleeding problem  Allergic/Immunologic: [ ] itchy eyes [ ] nasal discharge [ ] hives [ ] angioedema  [ ] All other systems negative  [ ] Unable to assess ROS because ________    OBJECTIVE:  ICU Vital Signs Last 24 Hrs  T(C): 36.8 (26 Aug 2019 11:25), Max: 36.8 (26 Aug 2019 11:25)  T(F): 98.2 (26 Aug 2019 11:25), Max: 98.2 (26 Aug 2019 11:25)  HR: 82 (26 Aug 2019 11:40) (82 - 93)  BP: 103/79 (26 Aug 2019 11:40) (103/79 - 138/76)  BP(mean): --  ABP: --  ABP(mean): --  RR: 18 (26 Aug 2019 11:40) (16 - 18)  SpO2: 97% (26 Aug 2019 11:40) (96% - 97%)        CAPILLARY BLOOD GLUCOSE          PHYSICAL EXAM:  General:  NAD  HEENT:  NC/AT  Lymph Nodes: No cervical or supraclavicular lymphadenopathy   Neck: Supple  Respiratory:  CTA B/L, no wheezes, crackles or rhonchi  Cardiovascular:  RRR, no m/r/g  Abdomen: soft, NT/ND, +BS  Extremities: no clubbing, cyanosis or edema, warm  Skin: no rash  Neurological: AAOx3, no focal deficits  Psychiatry: not anxious, normal affect    HOSPITAL MEDICATIONS:  MEDICATIONS  (STANDING):    MEDICATIONS  (PRN):      LABS:    Hgb Trend: 12.8<--  08-26    142  |  104  |  7   ----------------------------<  112<H>  3.8   |  27  |  0.76    Ca    9.3      26 Aug 2019 12:13    TPro  7.0  /  Alb  4.0  /  TBili  0.3  /  DBili  x   /  AST  10  /  ALT  9<L>  /  AlkPhos  115  08-26    Creatinine Trend: 0.76<--  PT/INR - ( 26 Aug 2019 12:14 )   PT: 13.5 sec;   INR: 1.17 ratio         PTT - ( 26 Aug 2019 12:14 )  PTT:39.4 sec      Venous Blood Gas:  08-26 @ 12:13  7.38/51/32/29/55  VBG Lactate: 1.3        MICROBIOLOGY:     RADIOLOGY:  [x ] Reviewed and interpreted by me  < from: Xray Chest 1 View AP/PA (08.26.19 @ 12:22) >  EXAM:  XR CHEST AP OR PA 1V                            PROCEDURE DATE:  08/26/2019            INTERPRETATION:  CLINICAL INFORMATION: Cough, pneumonia.    A single portable view of the chest was obtained.     Comparison: 8/13/2019.     The mediastinal cardiac silhouette is unremarkable. Right Mediport   unchanged.    The lungs are clear.     No acute osseous finding.     IMPRESSION:    Clear lungs.    NATHANIEL BLAKE M.D., ATTENDING RADIOLOGIST  This document has been electronically signed. Aug 26 2019 12:26PM    < end of copied text >      < from: CT Chest No Cont (08.13.19 @ 12:36) >  EXAM:  CT CHEST        PROCEDURE DATE:  08/13/2019           INTERPRETATION:  Reason for Exam:  Bronchiectasis. Nodules    CT of the chest was performed from the thoracic inlet to the level of the   adrenal glands without contrast injection.    Comparison: June 21, 2019 and March 21, 2019    Tubes/Lines: Right-sided Mediport catheter seen with its tip in the SVC.      Mediastinum/Vessels/Heart: Ascending aorta measures up to 3.8 cm in   maximal dimension, unchanged. Trace pericardial fluid. No   lymphadenopathy. Thyroid gland is unremarkable    Lungs/Pleura/Airways: Previously noted right lower lobe peripheral nodule   measures 1.6 x 1.1 cm and has slightly decreased in size when compared   with previous exam.    A right middle lobe peripheral pleural-based opacity measures 2.7 x 0.9   cm and is unchanged. Minimal areas of bronchial thickening in the right   middle lobe and bilateral lower lobes. A right basilar nodule measuring   1.0 cm is unchanged. No new nodules.    Visualized abdomen: Unremarkable noncontrast appearance of the upper   abdomen    Bones and soft tissues: No suspicious osseous lesions. Degenerative   changes noted throughout the spine. Diffuse bony demineralization.    IMPRESSION:    When compared with June 21, 2009 examination:    Previously noted right lower lobe peripheral nodule measures 1.6 x 1.1 cm   and has slightly decreased in size when compared with previous exam.    A right middle lobe peripheral pleural-based opacity measures 2.7 x 0.9   cm and is unchanged. Minimal areas of bronchial thickening in the right   middle lobe and bilateral lower lobes. A right basilar nodule measuring   1.0 cm is unchanged. No new nodules.      MARAH YOO M.D., ATTENDING RADIOLOGIST   This document has been electronically signed. Aug 13 2019  1:40PM    < end of copied text >    PULMONARY FUNCTION TESTS: Spirometry 6/11/19: severe obstructive defect    EKG: NSR at 81bpm, T wave inversion in AVL.     < from: Transthoracic Echocardiogram (08.23.19 @ 15:29) >  PROCEDURE: Transthoracic echocardiogram with 2-D, M-Mode  and complete spectral and color flow Doppler.  INDICATION: Dyspnea, unspecified (R06.00)  ------------------------------------------------------------------------  Dimensions:    Normal Values:  LA:     3.8    2.0 - 4.0 cm  Ao:     3.9    2.0 - 3.8 cm  SEPTUM: 1.1    0.6 - 1.2 cm  PWT:    1.0    0.6 - 1.1 cm  LVIDd:  4.2    3.0 - 5.6 cm  LVIDs:  2.8    1.8 - 4.0 cm  Derived variables:  LVMI: 85 g/m2  RWT: 0.47  Fractional short: 33 %  EF (Visual Estimate): 60-65 %  Doppler Peak Velocity (m/sec): AoV=1.8  ------------------------------------------------------------------------  Observations:  Mitral Valve: Mitral annular calcification, otherwise  normal mitral valve. Minimal mitral regurgitation.  Aortic Valve/Aorta: Calcified trileaflet aortic valve with  normal opening. Peak transaortic valve gradient equals 13  mm Hg. Moderate-severe aortic regurgitation. Peak left  ventricular outflow tract gradient equals 7 mm Hg.  Aortic Root: 3.9 cm.  Left Atrium: Moderate left atrial enlargement.  Left Ventricle: Normal left ventricular systolic function.  No segmental wall motion abnormalities. Normal left  ventricular internal dimensions and wall thicknesses.  Reversal of the E-A  waves of the mitral inflow pattern is  consistent with diastolic LV dysfunction.  Right Heart: Normal right atrium.Normal right ventricular  size and function. Normal tricuspid valve. Minimal  tricuspid regurgitation. Normal pulmonic valve.  Pericardium/Pleura: Normal pericardium with no pericardial  effusion.  Hemodynamic: Estimated right atrial pressure is 8 mmHg.  Estimated right ventricular systolic pressure equals 26 mm  Hg, assuming right atrial pressure equals 8 mm Hg,  consistent with normal pulmonary pressures.  ------------------------------------------------------------------------  Conclusions:  1. Moderate-severe aortic regurgitation.  2. Moderate left atrial enlargement.  3. Normal left ventricular internal dimensions and wall  thicknesses.  4. Normal left ventricular systolic function. No segmental  wall motion abnormalities.  5. Reversal of the E-A  waves of the mitral inflow pattern  is consistent with diastolic LV dysfunction.  6. Normal right ventricular size and function.  *** Compared with echocardiogram of 3/12/2018, no  significant changes noted.  ------------------------------------------------------------------------  Confirmed on  8/23/2019 - 16:34:53 by Baron Tristan M.D.  ------------------------------------------------------------------------    < end of copied text >    < from: NM Pulmonary Ventilation/Perfusion Scan (08.13.19 @ 09:02) >  EXAM:  NM PULM VENTILATION PERFUS IMG                                PROCEDURE DATE:  08/13/2019          INTERPRETATION:  RADIOPHARMACEUTICAL: 1 mCi Tc-99m-DTPA aerosol by   inhalation; 6 mCi Tc-99m-MAA, I.V.    CLINICAL STATEMENT: 68-year-old female with shortness of breath.    TECHNIQUE:  Ventilation and perfusion images of the lungs were obtained   following administration of Tc-99m-DTPA and Tc-99m-MAA. Images were   obtained in the anterior, posterior, both lateral, and all 4 oblique   projections. The study was interpreted in conjunction with chest   radiograph of 8/13/2019.    VQ scan from 3/29/2018    FINDINGS: Previously seen matched perfusion defect in the posterior basal   segment of the left lower lobe has resolved. Small matched defect in the   apical posterior segment of the left upper lobe is unchanged. There is   heterogeneous tracer distribution in the remainder of the lungs on both   the ventilation and the perfusion images. No new segmental perfusion   defects. There are no mismatched defects.    IMPRESSION:     Very low probability of pulmonary embolus. Interval resolution of matched   ventilation/perfusion defect involving the posterior basal segment of the   left lower lobe as compared to VQ scan from 3/29/2018.    BERNARDO SERNA M.D., NUCLEAR MEDICINE ATTENDING  This document has been electronically signed. Aug 13 2019  9:20AM        < end of copied text > [facility] - Division of Pulmonary, Critical Care and Sleep Medicine   Please call [phone] between 8am-pm weekdays, [phone] after hours and weekends      CHIEF COMPLAINT:  Acute onset of chest tightness and productive cough    HPI: 69 yo F with a h/o TBM s/p tracheo-bronchoplasty in 10/16 on chronic low dose Prednisone, asthma, bronchiectasis, lung nodules, Afib on Eliquis, DM2, HTN, Squamous cell CA of the anus s/p chemo/XRT in 2017, s/p abdominoperineal proctectomy for recurrent exophytic anal cancer in 7/18 who presents with acute onset productive cough and worsening SOB.  Has been undergoing extensive work up over the past month for progressive KRAMER including recent TTE, V/Q scan and CT chest. Notable findings for right sided peripheral lung nodules, unchanged from prior CTs. Echo with mod-severe AR and moderately dilated LA, diastolic dysfunction, normal RSVP.   Most recent PFTs with evidence of severe obstructive lung disease.   Last admitted 2/2019 acute hypoxic resp failure 2/2 coronovirus respiratory infection.   Has received antibiotics as an outpatient, most recently in July. Also most recent PET/CT 7/3/19 which shows mildly FDG-avid peripheral nodular opacity RML and anterior RLL nodule - unchanged size from prior.     Becomes dyspneic with minimal exertion.    Presented overnight on 8/26 with 1 day of productive cough, low grade fever of 100 F (while on chronic steroids) and chest tightness, feeling as if her "chest was caving in". In the ED received Solumedrol 40 IV and Duonebs x3. CXR does not show any acute pathology. 98% on RA and is speaking in full sentences.   Denies sick contacts.   RVP +entero/rhinovirus.   Anxious and upset as she is very concerned about her ongoing symptoms and the underlying etiology.      PAST MEDICAL & SURGICAL HISTORY:  TIA (transient ischemic attack): multiple, last 5 years ago - presents as right-sided weakness  DVT (deep venous thrombosis): 15-20 years ago, took coumadin  Seizure: x 1 1/7/18  Rectal bleeding  Colorectal cancer: 4/2018- last treatment , chemo and radiation  Tracheobronchomalacia: diagnosed 2015, s/p bronchial thermoplasty 2016 (Dr Zapien); recent bronchoscopy 6/5/2018 revealed no evidence of tracheobronchomalacia in trachea or bronchial tubes  Asthma  Pelvic fracture  Aortic insufficiency: moderate AR on echo 5/3/2018  Adrenal insufficiency: Medrol daily for over 50 years  COPD (chronic obstructive pulmonary disease)  Diabetes: Type 2  Atrial fibrillation: paroxysmal, on eliquis  Rectal bleeding: exam under anesthesia ([facility]) 2/2018  S/P bronchoscopy: 6/5/2018 - [facility] (Dr Zapien) no evidence of tracheobronchomalacia in trachea or bronchial tubes  History of tracheomalacia: 2015 - attempted tracheal stenting ([facility])- course complicated by obstruction, respiratory failure, multiple CPR attempts -  stent discontinued; 10/20/2016 Tracheobronchoplasty (Prolene Mesh) performed at [facility] by Dr Zapien  H/O pelvic surgery: 5 years ago - s/p fracture  Exostosis of orbit, 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  History of sinus surgery: multiple sinus surgeries  H/O total knee replacement, bilateral: 5 years ago  History of partial hysterectomy: 30 years ago - fibroids      FAMILY HISTORY:  Family history of diabetes mellitus type II  Family history of breast cancer (Sibling)  Family history of asthma      SOCIAL HISTORY:  Smoking: [ x] Never Smoked [ ] Former Smoker (__ packs x ___ years) [ ] Current Smoker  (__ packs x ___ years)  Substance Use: [x ] Never Used [ ] Used ____  EtOH Use: denies    Allergies  ampicillin (Short breath)  aspirin (Short breath)  Avelox (Short breath; Pruritus)  codeine (Short breath)  Dilaudid (Short breath)  iodine (Short breath; Swelling)  penicillin (Short breath)  shellfish (Anaphylaxis)  tetanus toxoid (Short breath)  Valium (Short breath)    HOME MEDICATIONS: Breo, Albuterol nebs, spiriva, prednisone    REVIEW OF SYSTEMS:  Constitutional: [+ ] fevers [- ] chills [- ] weight loss [ ] weight gain  HEENT: [- ] dry eyes [ ] eye irritation [ ] postnasal drip [ ] nasal congestion  CV: as per HPI   Resp: as per HPI  GI: [- ] nausea [- ] vomiting [ ] diarrhea [ ] constipation [ ] abd pain [ ] dysphagia   : [- ] dysuria [ ] nocturia [ ] hematuria [ ] increased urinary frequency  Musculoskeletal: [- ] myalgias [ ] arthralgias   Skin: [- ] rash [ ] itch  Neurological: [- ] headache [ ] dizziness [- ] syncope [ ] weakness [ ] numbness  [x ] All other systems negative  [ ] Unable to assess ROS because ________    OBJECTIVE:  ICU Vital Signs Last 24 Hrs  T(C): 36.8 (26 Aug 2019 11:25), Max: 36.8 (26 Aug 2019 11:25)  T(F): 98.2 (26 Aug 2019 11:25), Max: 98.2 (26 Aug 2019 11:25)  HR: 82 (26 Aug 2019 11:40) (82 - 93)  BP: 103/79 (26 Aug 2019 11:40) (103/79 - 138/76)  BP(mean): --  ABP: --  ABP(mean): --  RR: 18 (26 Aug 2019 11:40) (16 - 18)  SpO2: 97% (26 Aug 2019 11:40) (96% - 97%)        CAPILLARY BLOOD GLUCOSE    PHYSICAL EXAM:  General:  NAD  HEENT:  NC/AT  Neck: Supple  Respiratory:  CTA B/L, no wheezes, crackles or rhonchi; +anterior chest wall mediport  Cardiovascular:  RRR, no m/r/g  Abdomen: soft, NT/ND, +BS  Extremities: no clubbing, cyanosis or edema, warm  Skin: no rash  Neurological: AAOx3, no focal deficits    HOSPITAL MEDICATIONS:  MEDICATIONS  (STANDING):    MEDICATIONS  (PRN):      LABS:    Hgb Trend: 12.8<--  08-26    142  |  104  |  7   ----------------------------<  112<H>  3.8   |  27  |  0.76    Ca    9.3      26 Aug 2019 12:13    TPro  7.0  /  Alb  4.0  /  TBili  0.3  /  DBili  x   /  AST  10  /  ALT  9<L>  /  AlkPhos  115  08-26    Creatinine Trend: 0.76<--  PT/INR - ( 26 Aug 2019 12:14 )   PT: 13.5 sec;   INR: 1.17 ratio         PTT - ( 26 Aug 2019 12:14 )  PTT:39.4 sec      Venous Blood Gas:  08-26 @ 12:13  7.38/51/32/29/55  VBG Lactate: 1.3        MICROBIOLOGY:   RVP+entero/rhinovirus    RADIOLOGY:  [x ] Reviewed and interpreted by me  < from: Xray Chest 1 View AP/PA (08.26.19 @ 12:22) >  EXAM:  XR CHEST AP OR PA 1V                          PROCEDURE DATE:  08/26/2019        INTERPRETATION:  CLINICAL INFORMATION: Cough, pneumonia.    A single portable view of the chest was obtained.     Comparison: 8/13/2019.     The mediastinal cardiac silhouette is unremarkable. Right Mediport   unchanged.    The lungs are clear.     No acute osseous finding.     IMPRESSION:    Clear lungs.    NATHANIEL BLAKE M.D., ATTENDING RADIOLOGIST  This document has been electronically signed. Aug 26 2019 12:26PM    < end of copied text >      < from: CT Chest No Cont (08.13.19 @ 12:36) >  EXAM:  CT CHEST        PROCEDURE DATE:  08/13/2019           INTERPRETATION:  Reason for Exam:  Bronchiectasis. Nodules    CT of the chest was performed from the thoracic inlet to the level of the   adrenal glands without contrast injection.    Comparison: June 21, 2019 and March 21, 2019    Tubes/Lines: Right-sided Mediport catheter seen with its tip in the SVC.      Mediastinum/Vessels/Heart: Ascending aorta measures up to 3.8 cm in   maximal dimension, unchanged. Trace pericardial fluid. No   lymphadenopathy. Thyroid gland is unremarkable    Lungs/Pleura/Airways: Previously noted right lower lobe peripheral nodule   measures 1.6 x 1.1 cm and has slightly decreased in size when compared   with previous exam.    A right middle lobe peripheral pleural-based opacity measures 2.7 x 0.9   cm and is unchanged. Minimal areas of bronchial thickening in the right   middle lobe and bilateral lower lobes. A right basilar nodule measuring   1.0 cm is unchanged. No new nodules.    Visualized abdomen: Unremarkable noncontrast appearance of the upper   abdomen    Bones and soft tissues: No suspicious osseous lesions. Degenerative   changes noted throughout the spine. Diffuse bony demineralization.    IMPRESSION:    When compared with June 21, 2009 examination:    Previously noted right lower lobe peripheral nodule measures 1.6 x 1.1 cm   and has slightly decreased in size when compared with previous exam.    A right middle lobe peripheral pleural-based opacity measures 2.7 x 0.9   cm and is unchanged. Minimal areas of bronchial thickening in the right   middle lobe and bilateral lower lobes. A right basilar nodule measuring   1.0 cm is unchanged. No new nodules.      MARAH YOO M.D., ATTENDING RADIOLOGIST   This document has been electronically signed. Aug 13 2019  1:40PM    < end of copied text >    PULMONARY FUNCTION TESTS: Spirometry 6/11/19: severe obstructive defect    EKG: NSR at 81bpm, T wave inversion in AVL.     < from: Transthoracic Echocardiogram (08.23.19 @ 15:29) >  PROCEDURE: Transthoracic echocardiogram with 2-D, M-Mode  and complete spectral and color flow Doppler.  INDICATION: Dyspnea, unspecified (R06.00)  ------------------------------------------------------------------------  Dimensions:    Normal Values:  LA:     3.8    2.0 - 4.0 cm  Ao:     3.9    2.0 - 3.8 cm  SEPTUM: 1.1    0.6 - 1.2 cm  PWT:    1.0    0.6 - 1.1 cm  LVIDd:  4.2    3.0 - 5.6 cm  LVIDs:  2.8    1.8 - 4.0 cm  Derived variables:  LVMI: 85 g/m2  RWT: 0.47  Fractional short: 33 %  EF (Visual Estimate): 60-65 %  Doppler Peak Velocity (m/sec): AoV=1.8  ------------------------------------------------------------------------  Observations:  Mitral Valve: Mitral annular calcification, otherwise  normal mitral valve. Minimal mitral regurgitation.  Aortic Valve/Aorta: Calcified trileaflet aortic valve with  normal opening. Peak transaortic valve gradient equals 13  mm Hg. Moderate-severe aortic regurgitation. Peak left  ventricular outflow tract gradient equals 7 mm Hg.  Aortic Root: 3.9 cm.  Left Atrium: Moderate left atrial enlargement.  Left Ventricle: Normal left ventricular systolic function.  No segmental wall motion abnormalities. Normal left  ventricular internal dimensions and wall thicknesses.  Reversal of the E-A  waves of the mitral inflow pattern is  consistent with diastolic LV dysfunction.  Right Heart: Normal right atrium.Normal right ventricular  size and function. Normal tricuspid valve. Minimal  tricuspid regurgitation. Normal pulmonic valve.  Pericardium/Pleura: Normal pericardium with no pericardial  effusion.  Hemodynamic: Estimated right atrial pressure is 8 mmHg.  Estimated right ventricular systolic pressure equals 26 mm  Hg, assuming right atrial pressure equals 8 mm Hg,  consistent with normal pulmonary pressures.  ------------------------------------------------------------------------  Conclusions:  1. Moderate-severe aortic regurgitation.  2. Moderate left atrial enlargement.  3. Normal left ventricular internal dimensions and wall  thicknesses.  4. Normal left ventricular systolic function. No segmental  wall motion abnormalities.  5. Reversal of the E-A  waves of the mitral inflow pattern  is consistent with diastolic LV dysfunction.  6. Normal right ventricular size and function.  *** Compared with echocardiogram of 3/12/2018, no  significant changes noted.  ------------------------------------------------------------------------  Confirmed on  8/23/2019 - 16:34:53 by Baron Tristan M.D.  ------------------------------------------------------------------------    < end of copied text >    < from: NM Pulmonary Ventilation/Perfusion Scan (08.13.19 @ 09:02) >  EXAM:  NM PULM VENTILATION PERFUS IMG                                PROCEDURE DATE:  08/13/2019          INTERPRETATION:  RADIOPHARMACEUTICAL: 1 mCi Tc-99m-DTPA aerosol by   inhalation; 6 mCi Tc-99m-MAA, I.V.    CLINICAL STATEMENT: 68-year-old female with shortness of breath.    TECHNIQUE:  Ventilation and perfusion images of the lungs were obtained   following administration of Tc-99m-DTPA and Tc-99m-MAA. Images were   obtained in the anterior, posterior, both lateral, and all 4 oblique   projections. The study was interpreted in conjunction with chest   radiograph of 8/13/2019.    VQ scan from 3/29/2018    FINDINGS: Previously seen matched perfusion defect in the posterior basal   segment of the left lower lobe has resolved. Small matched defect in the   apical posterior segment of the left upper lobe is unchanged. There is   heterogeneous tracer distribution in the remainder of the lungs on both   the ventilation and the perfusion images. No new segmental perfusion   defects. There are no mismatched defects.    IMPRESSION:     Very low probability of pulmonary embolus. Interval resolution of matched   ventilation/perfusion defect involving the posterior basal segment of the   left lower lobe as compared to VQ scan from 3/29/2018.    BERNARDO SERNA M.D., NUCLEAR MEDICINE ATTENDING  This document has been electronically signed. Aug 13 2019  9:20AM        < end of copied text >

## 2019-08-26 NOTE — H&P ADULT - PROBLEM SELECTOR PLAN 8
-patient with rash, vesicular on right trunk in dermatome distribution -patient with itchy rash, vesicular on right trunk in a scattered distribution. Does not appear to be Shingles rash.   -Dermatology eval.

## 2019-08-27 DIAGNOSIS — J45.51 SEVERE PERSISTENT ASTHMA WITH (ACUTE) EXACERBATION: ICD-10-CM

## 2019-08-27 DIAGNOSIS — R91.8 OTHER NONSPECIFIC ABNORMAL FINDING OF LUNG FIELD: ICD-10-CM

## 2019-08-27 LAB
ALBUMIN SERPL ELPH-MCNC: 3.7 G/DL — SIGNIFICANT CHANGE UP (ref 3.3–5)
ALP SERPL-CCNC: 102 U/L — SIGNIFICANT CHANGE UP (ref 40–120)
ALT FLD-CCNC: 8 U/L — LOW (ref 10–45)
ANION GAP SERPL CALC-SCNC: 10 MMOL/L — SIGNIFICANT CHANGE UP (ref 5–17)
AST SERPL-CCNC: 9 U/L — LOW (ref 10–40)
BASOPHILS # BLD AUTO: 0 K/UL — SIGNIFICANT CHANGE UP (ref 0–0.2)
BASOPHILS NFR BLD AUTO: 0.2 % — SIGNIFICANT CHANGE UP (ref 0–2)
BILIRUB SERPL-MCNC: 0.2 MG/DL — SIGNIFICANT CHANGE UP (ref 0.2–1.2)
BUN SERPL-MCNC: 10 MG/DL — SIGNIFICANT CHANGE UP (ref 7–23)
CALCIUM SERPL-MCNC: 9.3 MG/DL — SIGNIFICANT CHANGE UP (ref 8.4–10.5)
CHLORIDE SERPL-SCNC: 103 MMOL/L — SIGNIFICANT CHANGE UP (ref 96–108)
CO2 SERPL-SCNC: 27 MMOL/L — SIGNIFICANT CHANGE UP (ref 22–31)
CREAT SERPL-MCNC: 0.66 MG/DL — SIGNIFICANT CHANGE UP (ref 0.5–1.3)
DIGOXIN SERPL-MCNC: 0.7 NG/ML — LOW (ref 0.8–2)
EOSINOPHIL # BLD AUTO: 0.1 K/UL — SIGNIFICANT CHANGE UP (ref 0–0.5)
EOSINOPHIL NFR BLD AUTO: 1.2 % — SIGNIFICANT CHANGE UP (ref 0–6)
GLUCOSE SERPL-MCNC: 101 MG/DL — HIGH (ref 70–99)
GRAM STN FLD: SIGNIFICANT CHANGE UP
HBA1C BLD-MCNC: 6.8 % — HIGH (ref 4–5.6)
HCT VFR BLD CALC: 39.2 % — SIGNIFICANT CHANGE UP (ref 34.5–45)
HGB BLD-MCNC: 11.7 G/DL — SIGNIFICANT CHANGE UP (ref 11.5–15.5)
LYMPHOCYTES # BLD AUTO: 1.4 K/UL — SIGNIFICANT CHANGE UP (ref 1–3.3)
LYMPHOCYTES # BLD AUTO: 26.8 % — SIGNIFICANT CHANGE UP (ref 13–44)
MAGNESIUM SERPL-MCNC: 2.2 MG/DL — SIGNIFICANT CHANGE UP (ref 1.6–2.6)
MCHC RBC-ENTMCNC: 22.9 PG — LOW (ref 27–34)
MCHC RBC-ENTMCNC: 30 GM/DL — LOW (ref 32–36)
MCV RBC AUTO: 76.6 FL — LOW (ref 80–100)
MONOCYTES # BLD AUTO: 0.6 K/UL — SIGNIFICANT CHANGE UP (ref 0–0.9)
MONOCYTES NFR BLD AUTO: 10.6 % — SIGNIFICANT CHANGE UP (ref 2–14)
NEUTROPHILS # BLD AUTO: 3.3 K/UL — SIGNIFICANT CHANGE UP (ref 1.8–7.4)
NEUTROPHILS NFR BLD AUTO: 61.1 % — SIGNIFICANT CHANGE UP (ref 43–77)
PHOSPHATE SERPL-MCNC: 3.2 MG/DL — SIGNIFICANT CHANGE UP (ref 2.5–4.5)
PLATELET # BLD AUTO: 244 K/UL — SIGNIFICANT CHANGE UP (ref 150–400)
POTASSIUM SERPL-MCNC: 3.8 MMOL/L — SIGNIFICANT CHANGE UP (ref 3.5–5.3)
POTASSIUM SERPL-SCNC: 3.8 MMOL/L — SIGNIFICANT CHANGE UP (ref 3.5–5.3)
PROT SERPL-MCNC: 6.5 G/DL — SIGNIFICANT CHANGE UP (ref 6–8.3)
RBC # BLD: 5.12 M/UL — SIGNIFICANT CHANGE UP (ref 3.8–5.2)
RBC # FLD: 14.8 % — HIGH (ref 10.3–14.5)
SODIUM SERPL-SCNC: 140 MMOL/L — SIGNIFICANT CHANGE UP (ref 135–145)
SPECIMEN SOURCE: SIGNIFICANT CHANGE UP
WBC # BLD: 5.4 K/UL — SIGNIFICANT CHANGE UP (ref 3.8–10.5)
WBC # FLD AUTO: 5.4 K/UL — SIGNIFICANT CHANGE UP (ref 3.8–10.5)

## 2019-08-27 PROCEDURE — 99223 1ST HOSP IP/OBS HIGH 75: CPT | Mod: GC

## 2019-08-27 PROCEDURE — 99233 SBSQ HOSP IP/OBS HIGH 50: CPT | Mod: GC

## 2019-08-27 PROCEDURE — 99233 SBSQ HOSP IP/OBS HIGH 50: CPT

## 2019-08-27 RX ORDER — POLYETHYLENE GLYCOL 3350 17 G/17G
17 POWDER, FOR SOLUTION ORAL DAILY
Refills: 0 | Status: DISCONTINUED | OUTPATIENT
Start: 2019-08-27 | End: 2019-08-28

## 2019-08-27 RX ORDER — SODIUM CHLORIDE 0.65 %
1 AEROSOL, SPRAY (ML) NASAL
Refills: 0 | Status: DISCONTINUED | OUTPATIENT
Start: 2019-08-27 | End: 2019-09-03

## 2019-08-27 RX ORDER — DOCUSATE SODIUM 100 MG
100 CAPSULE ORAL DAILY
Refills: 0 | Status: DISCONTINUED | OUTPATIENT
Start: 2019-08-27 | End: 2019-08-28

## 2019-08-27 RX ORDER — BACITRACIN ZINC NEOMYCIN SULFATE POLYMYXIN B SULFATE 400; 3.5; 5 [IU]/G; MG/G; [IU]/G
1 OINTMENT TOPICAL DAILY
Refills: 0 | Status: DISCONTINUED | OUTPATIENT
Start: 2019-08-27 | End: 2019-09-03

## 2019-08-27 RX ADMIN — Medication 1 APPLICATION(S): at 05:39

## 2019-08-27 RX ADMIN — APIXABAN 5 MILLIGRAM(S): 2.5 TABLET, FILM COATED ORAL at 05:39

## 2019-08-27 RX ADMIN — Medication 2: at 13:51

## 2019-08-27 RX ADMIN — Medication 4: at 18:08

## 2019-08-27 RX ADMIN — POLYETHYLENE GLYCOL 3350 17 GRAM(S): 17 POWDER, FOR SOLUTION ORAL at 17:39

## 2019-08-27 RX ADMIN — Medication 0.25 MILLIGRAM(S): at 05:40

## 2019-08-27 RX ADMIN — Medication 1 SPRAY(S): at 18:03

## 2019-08-27 RX ADMIN — ATORVASTATIN CALCIUM 40 MILLIGRAM(S): 80 TABLET, FILM COATED ORAL at 21:22

## 2019-08-27 RX ADMIN — TIOTROPIUM BROMIDE 1 CAPSULE(S): 18 CAPSULE ORAL; RESPIRATORY (INHALATION) at 11:12

## 2019-08-27 RX ADMIN — BACITRACIN ZINC NEOMYCIN SULFATE POLYMYXIN B SULFATE 1 APPLICATION(S): 400; 3.5; 5 OINTMENT TOPICAL at 17:32

## 2019-08-27 RX ADMIN — Medication 3 MILLILITER(S): at 11:13

## 2019-08-27 RX ADMIN — Medication 3 MILLILITER(S): at 23:47

## 2019-08-27 RX ADMIN — MONTELUKAST 10 MILLIGRAM(S): 4 TABLET, CHEWABLE ORAL at 13:51

## 2019-08-27 RX ADMIN — INSULIN GLARGINE 10 UNIT(S): 100 INJECTION, SOLUTION SUBCUTANEOUS at 10:15

## 2019-08-27 RX ADMIN — Medication 0.5 MILLIGRAM(S): at 11:12

## 2019-08-27 RX ADMIN — Medication 3 MILLILITER(S): at 05:39

## 2019-08-27 RX ADMIN — APIXABAN 5 MILLIGRAM(S): 2.5 TABLET, FILM COATED ORAL at 17:32

## 2019-08-27 RX ADMIN — Medication 100 MILLIGRAM(S): at 17:38

## 2019-08-27 RX ADMIN — Medication 40 MILLIGRAM(S): at 05:40

## 2019-08-27 RX ADMIN — PANTOPRAZOLE SODIUM 40 MILLIGRAM(S): 20 TABLET, DELAYED RELEASE ORAL at 05:39

## 2019-08-27 RX ADMIN — Medication 3 MILLILITER(S): at 18:12

## 2019-08-27 NOTE — PROGRESS NOTE ADULT - PROBLEM SELECTOR PROBLEM 6
Floor Colorectal cancer Type 2 diabetes mellitus without complication, with long-term current use of insulin Lung nodules

## 2019-08-27 NOTE — PROGRESS NOTE ADULT - PROBLEM SELECTOR PLAN 8
-patient with itchy rash, vesicular on right trunk in a scattered distribution. Does not appear to be Shingles rash.   -Dermatology eval. DVT PPx: eliquis  GI: protonix

## 2019-08-27 NOTE — PROGRESS NOTE ADULT - PROBLEM SELECTOR PROBLEM 2
Dyspnea and respiratory abnormalities normal... Severe persistent asthma, unspecified whether complicated Aortic valve insufficiency, etiology of cardiac valve disease unspecified

## 2019-08-27 NOTE — PROGRESS NOTE ADULT - ASSESSMENT
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  Has been undergoing extensive work up over the past month for progressive KRAMER including recent TTE, V/Q scan and CT chest. Notable findings for right sided peripheral lung nodules, unchanged from prior CTs. Echo with mod-severe AR and moderately dilated LA, diastolic dysfunction, normal RSVP.   Most recent PFTs with evidence of severe obstructive lung disease.   Last admitted 2/2019 acute hypoxic resp failure 2/2 coronovirus respiratory infection.   Has received antibiotics as an outpatient, most recently in July. Also most recent PET/CT 7/3/19 which shows mildly FDG-avid peripheral nodular opacity RML and anterior RLL nodule - unchanged size from prior.     Becomes dyspneic with minimal exertion.    Presented overnight on 8/26 with 1 day of productive cough, low grade fever of 100 F (while on chronic steroids) and chest tightness, feeling as if her "chest was caving in". In the ED received Solumedrol 40 IV and Duonebs x3. CXR does not show any acute pathology. 98% on RA and is speaking in full sentences.   Denies sick contacts.   RVP +entero/rhinovirus.   Anxious and upset as she is very concerned about her ongoing symptoms and the underlying etiology.      A/P: Extensive history as per above with chronic KRAMER, worsening and associated with productive cough and chest tightness over the past 24 hours.  1. RVP positive - likely viral bronchitis, hold off on antibiotics for now; c/w home medication regimen including bronchodilators, Budesonide nebs, Spiriva  Remains on Solumedrol 40 mg IV - can transition to Prednisone to 40 mg as get closer to discharge and taper down to home dose slowly   Airway clearance therapy - acapella device 4x a day, incentive spirometer  Supplemental O2 as needed, goal sats 92-96%    2. Dyspnea - Concern for underlying cardiac etiology of her dyspnea, including worsening valvular disease.   Dr. Leahy to be consulted, consider possible LHC/RHC  EKG sinus rhythm, new TWI in AVL.  -c/w home med regimen    3. Lung nodules - not likely the cause of her symptoms of dyspnea  unclear etiology, mildly PET-FDG avid and unchanged in size despite antibiotics since Feb 2019 after her acute resp infection. Consider biopsy - transthoracic approach may be best, will d/w Thoracic radiology but this work up should be done as an outpatient.     4. DVT ppx    Will follow up.

## 2019-08-27 NOTE — PROGRESS NOTE ADULT - PROBLEM SELECTOR PLAN 4
-pt EKG with p-wave inversions in V5, II, this abnormality is seen in previous EKG from 4/2019  -TTE 8/19: pt diastolic dysfunction, LA dilitation and moderate-severe AR, AR apparently increased from prior report in 3/2018 that mentioned moderate AR  -consider cardiology evaluation, structural heart team see if any intervention would be indicated in setting of worsening AR.   -f/u cardiac enzymes and BNP.  -Strict I's/O's and daily weights. -c/w eliquis  -c/w digoxin  -F/u digoxin level. -ostomy care orders  -functioning, clean, ctm  - functional Chemoport in place

## 2019-08-27 NOTE — PROGRESS NOTE ADULT - PROBLEM SELECTOR PROBLEM 5
Atrial fibrillation, unspecified type Colorectal cancer Type 2 diabetes mellitus without complication, with long-term current use of insulin

## 2019-08-27 NOTE — PROGRESS NOTE ADULT - PROBLEM SELECTOR PLAN 1
-pt with RVP +enterorhinovirus  -supportive care  -no need for empiric Abx at this time, hemodynamically stable, afebrile, no leukocytosis  -c/w supplemental oxygen, 2L via nasal cannula, wean off as tolerated  -c/w singulair, budesonide, spiriva and duonebs Q6H  -patient on performist and breo at home, we do not carry those medications  -follows with Dr. Allison for pulmonology  -f/u pulmonology reccs  -S/p IV Solumedrol 40mg in ED. -C/w Prednisone 40mg x 5 days. Afterwards resume home Medrol dose.  -Chest PT/Acapella/incentive spirometer. -as above, viral respiratory infection in setting of pt with severe asthma  -ctm as described  -unlikely lung nodules are cause of SOB, CT chest stable, compared to 2009 study  -r/o cardiac pathology  -pt EKG with p-wave inversions in V5, II, this abnormality is seen in previous EKG from 4/2019  -TTE 8/19: pt diastolic dysfunction, LA dilitation and moderate-severe AR, AR apparently increased from prior report in 3/2018 that mentioned moderate AR  -consider cardiology evaluation, structural heart team see if any intervention would be indicated in setting of worsening AR.   -f/u cardiac enzymes and BNP.  -Patient has an old Chemoport in place, which could possibly be a cause of thromboembolic events/infection. Consider removal if no longer clinically indicated. Pt with hx of severe persistent asthma, 10+ intubations (last one 5yrs ago), presents with SOB and dyspnea likely asthma exacerbation 2/2 to viral infection  - RVP + entero/rhino virus  - provide supportive care  -c/w singulair, budesonide, spiriva and duonebs Q6H  - received 40mg solumedrol in the ED   -c/w supplemental oxygen, 2L via nasal cannula, wean off as tolerated  -c/w singulair, prednisone, budesonide, spiriva and PRN duonebs  -patient on performist and breo at home, we do not carry those medications  -follows with Dr. Allison for pulmonology  - c/w with IV steroids   - c/w chest PT/Acapella/incentive spirometer  - pulmonology following, appreciated recs

## 2019-08-27 NOTE — PROGRESS NOTE ADULT - ASSESSMENT
The patient is a 69 y/o woman with asthma (on long term medrol) c/b adrenal insufficiency, afib on eliquis, DM2, TIA, colon cancer (s/p resection, chemo, RT), tracheobronchomalacia s/p bronchial thermoplasty who presented to the ED with SOB and dyspnea on exertion most likely due to asthma exacerbation 2/2 to viral infection, also being follow by cardiology due worsening dyspnea in the setting of aortic regurgitation. The patient is a 69 y/o woman with asthma (on long term medrol) c/b adrenal insufficiency, afib on eliquis, DM2, TIA, colon cancer (s/p resection, chemo, RT), tracheobronchomalacia s/p bronchial thermoplasty who presented to the ED with SOB and dyspnea on exertion most likely due to asthma exacerbation 2/2 to viral infection, also being follow by cardiology due to worsening dyspnea in the setting of aortic regurgitation.

## 2019-08-27 NOTE — PROGRESS NOTE ADULT - PROBLEM SELECTOR PROBLEM 3
Severe persistent asthma, unspecified whether complicated Aortic valve insufficiency, etiology of cardiac valve disease unspecified Atrial fibrillation, unspecified type

## 2019-08-27 NOTE — PROGRESS NOTE ADULT - SUBJECTIVE AND OBJECTIVE BOX
Olamide John PGY1  Pager: [phone]      Chief Complaint: 67 y/o woman with asthma (on long term medrol) c/b adrenal insufficiency, afib on eliquis, DM2, colon cancer, tracheobronchomalacia s/p bronchial thermoplasty who presented to the ED with SOB and dyspnea       Subjective: Patient was awake and alert, walking around in the round, breathing comfortably on RA.  States she feels better than when she fist came in, however she still feels she has tight chest. She is eating well, and her ostomy bag is dry and clean. Patient denies any chest pain, headaches, dizziness, n/v, abd pain, dysuria, or hematuria.         VITAL SIGNS:  Vital Signs Last 24 Hrs  T(C): 36.6 (27 Aug 2019 13:45), Max: 36.9 (26 Aug 2019 15:30)  T(F): 97.9 (27 Aug 2019 13:45), Max: 98.5 (26 Aug 2019 15:30)  HR: 79 (27 Aug 2019 13:45) (68 - 93)  BP: 111/68 (27 Aug 2019 13:45) (109/73 - 136/69)  BP(mean): --  RR: 18 (27 Aug 2019 13:45) (18 - 22)  SpO2: 97% (27 Aug 2019 13:45) (94% - 98%)      PHYSICAL EXAM:   GENERAL: middle age women, wearing hospital gown, well groomed, sitting comfortably in bed in NAD    HEENT: right eye PERRL, MMM, +white exudates in the posterior pharynx. has a left eye prosthetic,   Pulm: normal work of breathing, +mild inspiratory wheezing, no rales or crackles were appreciated  CV: RRR, S1&S2+, no m/r/g appreciated, distant, +chemoport in right anterior chest wall (skin dry and intact)  ABDOMEN: soft, nt, nd, no hepatosplenomegaly.   MSK: ROM wnl in upper and lower extremities, no gross abnormalities noted  EXTREMITIES:  no edama, clubbing, or cyanosis  Neuro: A&Ox3, fluent speech, strength 5/5 in the upper and lower extremities, sensation intact in the upper and lower extremities.   SKIN: warm and dry. +vesicular, pustule rash, no well defined border in the R upper and lower quadrant regions. Unilateral, does not cross the midline in the front and does not spread to the back                         11.7   5.4   )-----------( 244      ( 27 Aug 2019 07:40 )             39.2     08-27    140  |  103  |  10  ----------------------------<  101<H>  3.8   |  27  |  0.66    Ca    9.3      27 Aug 2019 07:40  Phos  3.2     08-27  Mg     2.2     08-27    TPro  6.5  /  Alb  3.7  /  TBili  0.2  /  DBili  x   /  AST  9<L>  /  ALT  8<L>  /  AlkPhos  102  08-27      CAPILLARY BLOOD GLUCOSE      POCT Blood Glucose.: 194 mg/dL (27 Aug 2019 13:42)  POCT Blood Glucose.: 143 mg/dL (27 Aug 2019 10:11)  POCT Blood Glucose.: 221 mg/dL (26 Aug 2019 21:39)  POCT Blood Glucose.: 242 mg/dL (26 Aug 2019 18:52)      MEDICATIONS  (STANDING):  ALBUTerol/ipratropium for Nebulization 3 milliLiter(s) Nebulizer every 6 hours  apixaban 5 milliGRAM(s) Oral every 12 hours  atorvastatin 40 milliGRAM(s) Oral at bedtime  buDESOnide    Inhalation Suspension 0.5 milliGRAM(s) Inhalation daily  dextrose 5%. 1000 milliLiter(s) (50 mL/Hr) IV Continuous <Continuous>  dextrose 50% Injectable 12.5 Gram(s) IV Push once  dextrose 50% Injectable 25 Gram(s) IV Push once  dextrose 50% Injectable 25 Gram(s) IV Push once  digoxin     Tablet 0.25 milliGRAM(s) Oral daily  hydrocortisone 1% Cream 1 Application(s) Topical two times a day  insulin glargine Injectable (LANTUS) 10 Unit(s) SubCutaneous every morning  insulin lispro (HumaLOG) corrective regimen sliding scale   SubCutaneous three times a day before meals  insulin lispro (HumaLOG) corrective regimen sliding scale   SubCutaneous at bedtime  methylPREDNISolone sodium succinate Injectable 34 milliGRAM(s) IV Push once  montelukast 10 milliGRAM(s) Oral daily  neomycin/BACItracin/polymyxin Topical Ointment 1 Application(s) Topical daily  pantoprazole    Tablet 40 milliGRAM(s) Oral before breakfast  tiotropium 18 MICROgram(s) Capsule 1 Capsule(s) Inhalation daily    MEDICATIONS  (PRN):  acetaminophen   Tablet .. 650 milliGRAM(s) Oral every 6 hours PRN Mild Pain (1 - 3), Moderate Pain (4 - 6)  dextrose 40% Gel 15 Gram(s) Oral once PRN Blood Glucose LESS THAN 70 milliGRAM(s)/deciliter  glucagon  Injectable 1 milliGRAM(s) IntraMuscular once PRN Glucose LESS THAN 70 milligrams/deciliter  sodium chloride 0.65% Nasal 1 Spray(s) Both Nostrils two times a day PRN Nasal Congestion Olamide John PGY1  Pager: [phone]      Chief Complaint: 67 y/o woman with asthma (on long term medrol) c/b adrenal insufficiency, afib on eliquis, DM2, colon cancer, tracheobronchomalacia s/p bronchial thermoplasty who presented to the ED with SOB and dyspnea       Subjective: Patient was awake and alert, walking around in the round, breathing comfortably on RA.  States she feels better than when she fist came in, however she still feels she has a tight chest. She is eating well, and her ostomy bag is dry and clean. Patient denies any chest pain, headaches, dizziness, n/v, abd pain, dysuria, or hematuria.         VITAL SIGNS:  Vital Signs Last 24 Hrs  T(C): 36.6 (27 Aug 2019 13:45), Max: 36.9 (26 Aug 2019 15:30)  T(F): 97.9 (27 Aug 2019 13:45), Max: 98.5 (26 Aug 2019 15:30)  HR: 79 (27 Aug 2019 13:45) (68 - 93)  BP: 111/68 (27 Aug 2019 13:45) (109/73 - 136/69)  BP(mean): --  RR: 18 (27 Aug 2019 13:45) (18 - 22)  SpO2: 97% (27 Aug 2019 13:45) (94% - 98%)      PHYSICAL EXAM:   GENERAL: middle age women, wearing hospital gown, well groomed, sitting comfortably in bed in NAD    HEENT: right eye PERRL, MMM, +white exudates in the posterior pharynx. has a left eye prosthetic,   Pulm: normal work of breathing, +mild inspiratory wheezing, no rales or crackles were appreciated  CV: RRR, S1&S2+, no m/r/g appreciated, distant, +chemoport in right anterior chest wall (skin dry and intact)  ABDOMEN: soft, nt, nd, no hepatosplenomegaly.   MSK: ROM wnl in upper and lower extremities, no gross abnormalities noted  EXTREMITIES:  no edama, clubbing, or cyanosis  Neuro: A&Ox3, fluent speech, strength 5/5 in the upper and lower extremities, sensation intact in the upper and lower extremities.   SKIN: warm and dry. +vesicular, pustule rash, no well defined border in the R upper and lower quadrant regions. Unilateral, does not cross the midline in the front and does not spread to the back                         11.7   5.4   )-----------( 244      ( 27 Aug 2019 07:40 )             39.2     08-27    140  |  103  |  10  ----------------------------<  101<H>  3.8   |  27  |  0.66    Ca    9.3      27 Aug 2019 07:40  Phos  3.2     08-27  Mg     2.2     08-27    TPro  6.5  /  Alb  3.7  /  TBili  0.2  /  DBili  x   /  AST  9<L>  /  ALT  8<L>  /  AlkPhos  102  08-27      CAPILLARY BLOOD GLUCOSE      POCT Blood Glucose.: 194 mg/dL (27 Aug 2019 13:42)  POCT Blood Glucose.: 143 mg/dL (27 Aug 2019 10:11)  POCT Blood Glucose.: 221 mg/dL (26 Aug 2019 21:39)  POCT Blood Glucose.: 242 mg/dL (26 Aug 2019 18:52)      MEDICATIONS  (STANDING):  ALBUTerol/ipratropium for Nebulization 3 milliLiter(s) Nebulizer every 6 hours  apixaban 5 milliGRAM(s) Oral every 12 hours  atorvastatin 40 milliGRAM(s) Oral at bedtime  buDESOnide    Inhalation Suspension 0.5 milliGRAM(s) Inhalation daily  dextrose 5%. 1000 milliLiter(s) (50 mL/Hr) IV Continuous <Continuous>  dextrose 50% Injectable 12.5 Gram(s) IV Push once  dextrose 50% Injectable 25 Gram(s) IV Push once  dextrose 50% Injectable 25 Gram(s) IV Push once  digoxin     Tablet 0.25 milliGRAM(s) Oral daily  hydrocortisone 1% Cream 1 Application(s) Topical two times a day  insulin glargine Injectable (LANTUS) 10 Unit(s) SubCutaneous every morning  insulin lispro (HumaLOG) corrective regimen sliding scale   SubCutaneous three times a day before meals  insulin lispro (HumaLOG) corrective regimen sliding scale   SubCutaneous at bedtime  methylPREDNISolone sodium succinate Injectable 34 milliGRAM(s) IV Push once  montelukast 10 milliGRAM(s) Oral daily  neomycin/BACItracin/polymyxin Topical Ointment 1 Application(s) Topical daily  pantoprazole    Tablet 40 milliGRAM(s) Oral before breakfast  tiotropium 18 MICROgram(s) Capsule 1 Capsule(s) Inhalation daily    MEDICATIONS  (PRN):  acetaminophen   Tablet .. 650 milliGRAM(s) Oral every 6 hours PRN Mild Pain (1 - 3), Moderate Pain (4 - 6)  dextrose 40% Gel 15 Gram(s) Oral once PRN Blood Glucose LESS THAN 70 milliGRAM(s)/deciliter  glucagon  Injectable 1 milliGRAM(s) IntraMuscular once PRN Glucose LESS THAN 70 milligrams/deciliter  sodium chloride 0.65% Nasal 1 Spray(s) Both Nostrils two times a day PRN Nasal Congestion

## 2019-08-27 NOTE — CONSULT NOTE ADULT - ASSESSMENT
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  Has been undergoing extensive work up over the past month for progressive KRAMER including recent TTE, V/Q scan and CT chest. Notable findings for right sided peripheral lung nodules, unchanged from prior CTs. Echo with mod-severe AR and moderately dilated LA, diastolic dysfunction, normal RSVP.   Most recent PFTs with evidence of severe obstructive lung disease.   Last admitted 2/2019 acute hypoxic resp failure 2/2 coronovirus respiratory infection.   Has received antibiotics as an outpatient, most recently in July. Also most recent PET/CT 7/3/19 which shows mildly FDG-avid peripheral nodular opacity RML and anterior RLL nodule - unchanged size from prior.     Becomes dyspneic with minimal exertion.    Presented overnight on 8/26 with 1 day of productive cough, low grade fever of 100 F (while on chronic steroids) and chest tightness, feeling as if her "chest was caving in". In the ED received Solumedrol 40 IV and Duonebs x3. CXR does not show any acute pathology. 98% on RA and is speaking in full sentences.   Denies sick contacts.   RVP +entero/rhinovirus.   Anxious and upset as she is very concerned about her ongoing symptoms and the underlying etiology.      A/P: Extensive history as per above with chronic KRAMER, worsening and associated with productive cough and chest tightness over the past 24 hours.  1. RVP positive - likely viral bronchitis, hold off on antibiotics for now; c/w home medication regimen including bronchodilators, Budesonide nebs, Spiriva  -Can increase prednisone to 40 mg for 5 days and taper down to home dose slowy   Airway clearance therapy - acapella device 4x a day, incentive spirometer  Supplemental O2 as needed, goal sats 92-96%    2. Dyspnea - Concern for underlying cardiac etiology of her dyspnea, including worsening valvular disease. Dr. Leahy to be consulted, consider possible LHC/RHC  -please send cardiac enzymes, proBNP. EKG sinus rhythm, new TWI in AVL.  -c/w home med regimen    3. Lung nodules - not likely the cause of her symptoms of dyspnea  unclear etiology, mildly PET-FDG avid and unchanged in size despite antibiotics. Consider biopsy - transthoracic approach may be best, will d/w Thoracic radiology.    4. DVT ppx    Thank you for the consult. Will follow up.
Assessment  68F PMH long-standing asthma and multiple other medical problems, moderate-severe aortic insufficiency known since at least 2016, presenting for worsening dyspnea in the setting of enterovirus + RVP. Given her symptoms and stable echo, the patient's symptoms and stable echo are consistent with likely asthma exacerbation 2/2 viral infection, rather than congestive heart failure 2/2 AI.    Plan  -No indication for further cardiac workup

## 2019-08-27 NOTE — PROGRESS NOTE ADULT - PROBLEM SELECTOR PLAN 5
-c/w eliquis  -c/w digoxin  -F/u digoxin level. -ostomy care orders  -functioning, clean, ctm - c/w lantus 10U in the morning per patient, continue  -Consistent carbohydrate diet with Glucerna.  -C/w atorvastatin for HLD

## 2019-08-27 NOTE — PROGRESS NOTE ADULT - PROBLEM SELECTOR PLAN 3
Pt with hx of severe persistent asthma, 10+ intubations (last one 5yrs ago), preset ing with SOB and dyspnea  - likely asthma exacerbation 2/2 to viral infection  - RVP + entero/rhino virus  - provide supportive care  -c/w singulair, budesonide, spiriva and duonebs Q6H  - received 40mg solumedrol in the ED   -c/w supplemental oxygen, 2L via nasal cannula, wean off as tolerated  -c/w singulair, prednisone, budesonide, spiriva and PRN duonebs  -patient on performist and breo at home, we do not carry those medications  -follows with Dr. Allison for pulmonology  -f/u pulmonology reccs  -S/p IV Solumedrol 40mg in ED. -C/w Prednisone 40mg x 5 days. Afterwards resume home Medrol dose.  -Sputum culture. Pt with hx of severe persistent asthma, 10+ intubations (last one 5yrs ago), preset ing with SOB and dyspnea  - likely asthma exacerbation 2/2 to viral infection  - RVP + entero/rhino virus  - provide supportive care  -c/w singulair, budesonide, spiriva and duonebs Q6H  - received 40mg solumedrol in the ED   -c/w supplemental oxygen, 2L via nasal cannula, wean off as tolerated  -c/w singulair, prednisone, budesonide, spiriva and PRN duonebs  -patient on performist and breo at home, we do not carry those medications  -follows with Dr. Allison for pulmonology  -f/u pulmonology reccs  -S/p IV Solumedrol 40mg in ED  - c/w with IV steroids   -Sputum culture. -pt EKG with p-wave inversions in V5, II, this abnormality is seen in previous EKG from 4/2019  -TTE 8/19: pt diastolic dysfunction, LA dilitation and moderate-severe AR, AR apparently increased from prior report in 3/2018 that mentioned moderate AR  -consider cardiology evaluation, structural heart team see if any intervention would be indicated in setting of worsening AR.   -f/u cardiac enzymes and BNP.  -Strict I's/O's and daily weights. -c/w eliquis  -c/w digoxin  -F/u digoxin level.

## 2019-08-27 NOTE — PROGRESS NOTE ADULT - PROBLEM SELECTOR PLAN 6
-ostomy care orders  -functioning, clean, ctm DM protocol, ISS, FSGs q6h, basal lantus 10u in the morning per patient, continue  -Consistent carbohydrate diet with Glucerna.  -C/w atorvastatin for HLD. CT chest: +lung nodules in the RML  - Unlikely the cause of her symptoms of dyspnea  - unclear etiology, mildly PET-FDG avid and unchanged in size despite antibiotics since Feb 2019  - f/u work up as outpt with Dr. Allison

## 2019-08-27 NOTE — PROGRESS NOTE ADULT - SUBJECTIVE AND OBJECTIVE BOX
[facility] - Division of Pulmonary, Critical Care and Sleep Medicine   Please call [phone] between 8am-pm weekdays, [phone] after hours and weekends    Interval Events: No events overnight, breathing and cough are overall improving, however very dyspneic with exertion. Afebrile, denies chest pain    REVIEW OF SYSTEMS:  CV: [- ] chest pain   Resp: [+ ] cough [+ ] shortness of breath   [x ] All other systems negative  [ ] Unable to assess ROS because ________    OBJECTIVE:  ICU Vital Signs Last 24 Hrs  T(C): 36.6 (27 Aug 2019 13:45), Max: 36.9 (26 Aug 2019 15:30)  T(F): 97.9 (27 Aug 2019 13:45), Max: 98.5 (26 Aug 2019 15:30)  HR: 79 (27 Aug 2019 13:45) (68 - 93)  BP: 111/68 (27 Aug 2019 13:45) (109/73 - 136/69)  BP(mean): --  ABP: --  ABP(mean): --  RR: 18 (27 Aug 2019 13:45) (18 - 22)  SpO2: 97% (27 Aug 2019 13:45) (94% - 98%)        08-26 @ 07:01 - 08-27 @ 07:00  --------------------------------------------------------  IN: 0 mL / OUT: 500 mL / NET: -500 mL    08-27 @ 07:01 - 08-27 @ 14:32  --------------------------------------------------------  IN: 260 mL / OUT: 0 mL / NET: 260 mL      CAPILLARY BLOOD GLUCOSE      POCT Blood Glucose.: 194 mg/dL (27 Aug 2019 13:42)      PHYSICAL EXAM:  General: NAD  HEENT: NC/AT  Neck: supple  Respiratory:  distant BS throughout, scattered exp wheezes, no crackles or rhonchi  Cardiovascular:  RRR, no m/r/g  Abdomen: soft, NT/ND, +BS  Extremities: mild clubbing, no cyanosis or edema, warm  Skin: no rash  Neurological: AAOx3, non focal exam  Psychiatry: not anxious appearing, normal affect and mood    HOSPITAL MEDICATIONS:  MEDICATIONS  (STANDING):  ALBUTerol/ipratropium for Nebulization 3 milliLiter(s) Nebulizer every 6 hours  apixaban 5 milliGRAM(s) Oral every 12 hours  atorvastatin 40 milliGRAM(s) Oral at bedtime  buDESOnide    Inhalation Suspension 0.5 milliGRAM(s) Inhalation daily  dextrose 5%. 1000 milliLiter(s) (50 mL/Hr) IV Continuous <Continuous>  dextrose 50% Injectable 12.5 Gram(s) IV Push once  dextrose 50% Injectable 25 Gram(s) IV Push once  dextrose 50% Injectable 25 Gram(s) IV Push once  digoxin     Tablet 0.25 milliGRAM(s) Oral daily  hydrocortisone 1% Cream 1 Application(s) Topical two times a day  insulin glargine Injectable (LANTUS) 10 Unit(s) SubCutaneous every morning  insulin lispro (HumaLOG) corrective regimen sliding scale   SubCutaneous three times a day before meals  insulin lispro (HumaLOG) corrective regimen sliding scale   SubCutaneous at bedtime  methylPREDNISolone sodium succinate Injectable 34 milliGRAM(s) IV Push once  montelukast 10 milliGRAM(s) Oral daily  neomycin/BACItracin/polymyxin Topical Ointment 1 Application(s) Topical daily  pantoprazole    Tablet 40 milliGRAM(s) Oral before breakfast  tiotropium 18 MICROgram(s) Capsule 1 Capsule(s) Inhalation daily    MEDICATIONS  (PRN):  acetaminophen   Tablet .. 650 milliGRAM(s) Oral every 6 hours PRN Mild Pain (1 - 3), Moderate Pain (4 - 6)  dextrose 40% Gel 15 Gram(s) Oral once PRN Blood Glucose LESS THAN 70 milliGRAM(s)/deciliter  glucagon  Injectable 1 milliGRAM(s) IntraMuscular once PRN Glucose LESS THAN 70 milligrams/deciliter  sodium chloride 0.65% Nasal 1 Spray(s) Both Nostrils two times a day PRN Nasal Congestion      LABS:                        11.7   5.4   )-----------( 244      ( 27 Aug 2019 07:40 )             39.2     Hgb Trend: 11.7<--, 12.6<--, 12.8<--  08-27    140  |  103  |  10  ----------------------------<  101<H>  3.8   |  27  |  0.66    Ca    9.3      27 Aug 2019 07:40  Phos  3.2     08-27  Mg     2.2     08-27    TPro  6.5  /  Alb  3.7  /  TBili  0.2  /  DBili  x   /  AST  9<L>  /  ALT  8<L>  /  AlkPhos  102  08-27    Creatinine Trend: 0.66<--, 0.76<--  PT/INR - ( 26 Aug 2019 12:14 )   PT: 13.5 sec;   INR: 1.17 ratio         PTT - ( 26 Aug 2019 12:14 )  PTT:39.4 sec      Venous Blood Gas:  08-26 @ 12:13  7.38/51/32/29/55  VBG Lactate: 1.3      MICROBIOLOGY:     RADIOLOGY:  [x ] Reviewed and interpreted by me

## 2019-08-27 NOTE — PROGRESS NOTE ADULT - PROBLEM SELECTOR PLAN 7
DM protocol, ISS, FSGs q6h, basal lantus 10u in the morning per patient, continue  -Consistent carbohydrate diet with Glucerna.  -C/w atorvastatin for HLD. -patient with itchy rash, vesicular on right trunk in a scattered distribution. Does not appear to be Shingles rash.   -Dermatology eval. -patient with itchy rash, vesicular on right trunk in a scattered distribution. Low suspicion for shingles.   - start neosporin

## 2019-08-27 NOTE — PROGRESS NOTE ADULT - PROBLEM SELECTOR PROBLEM 4
Aortic valve insufficiency, etiology of cardiac valve disease unspecified Atrial fibrillation, unspecified type Colorectal cancer

## 2019-08-27 NOTE — PROGRESS NOTE ADULT - PROBLEM SELECTOR PLAN 2
-as above, viral respiratory infection in setting of pt with severe asthma  -ctm as described  -unlikely lung nodules are cause of SOB, CT chest stable, compared to 2009 study  -r/o cardiac pathology  -pt EKG with p-wave inversions in V5, II, this abnormality is seen in previous EKG from 4/2019  -TTE 8/19: pt diastolic dysfunction, LA dilitation and moderate-severe AR, AR apparently increased from prior report in 3/2018 that mentioned moderate AR  -consider cardiology evaluation, structural heart team see if any intervention would be indicated in setting of worsening AR.   -f/u cardiac enzymes and BNP.  -Patient has an old Chemoport in place, which could possibly be a cause of thromboembolic events/infection. Consider removal if no longer clinically indicated. Pt with hx of severe persistent asthma, 10+ intubations (last one 5yrs ago), preset ing with SOB and dyspnea  - likely asthma exacerbation 2/2 to viral infection  - RVP + entero/rhino virus  - provide supportive care  -c/w singulair, budesonide, spiriva and duonebs Q6H  - received 40mg solumedrol in the ED   -c/w supplemental oxygen, 2L via nasal cannula, wean off as tolerated  -c/w singulair, prednisone, budesonide, spiriva and PRN duonebs  -patient on performist and breo at home, we do not carry those medications  -follows with Dr. Allison for pulmonology  - c/w with IV steroids   - c/w chest PT/Acapella/incentive spirometer  - pulmonology following, appreciated recs Patient with increased dyspnea despite txment for astham, suspicion for cardia component in the setting of AR  -pt EKG with p-wave inversions in V5, II, this abnormality is seen in previous EKG from 4/2019  -TTE 8/23: +Moderate-severe aortic regurgitation, Moderate left atrial enlargement.  -cardiac enzymes wnl  -Strict I's/O's and daily weights.  - f/u with cardiology eval

## 2019-08-27 NOTE — CONSULT NOTE ADULT - ATTENDING COMMENTS
68 year old woman with bronchiectasis, exacerbated COPD, who has murmur of aortic insufficiency. Otherwise exam with no stigmata of severe aortic insufficiency, does not have wide pulse pressure. Echocardiogram has Doppler evidence of moderate to severe AI, but LV is not enlarged and is unchanged from 1 year ago when had similarly described AI. Left atrial size is normal as well. There is no pulmonary hypertension.    Natural history of aortic insufficiency is many years to decades and there is no role for aortic valve replacement now or in foreseeable future. Cardiac catheterization is exceedingly unlikely to have any beneficial yield. Issues have been reviewed with her outpatient cardiologist who is in complete concurrence.

## 2019-08-27 NOTE — CONSULT NOTE ADULT - SUBJECTIVE AND OBJECTIVE BOX
Patient seen and evaluated at bedside    Chief Complaint:    HPI:  The patient is a 67 y/o woman with asthma (on long term medrol) c/b adrenal insufficiency, afib on eliquis, DM2, TIA, colon cancer (s/p resection, chemo, RT), tracheobronchomalacia s/p bronchial thermoplasty who presented to the ED with SOB and dyspnea on exertion. Patient states this has been getting progressively worse for months. She had a V/Q scan as an outpatient as well as a CT chest. V/Q was low possibility of PE, CT chest showed two stable right lung nodules and a RML pleural opacity that is unchanged from last study. Patient reports cough, productive of yellowish sputum. No fever, chills, sick contacts. No nausea, vomiting, diarrhea, hematuria, dysuria, chest pain. No recent travel. Patient called EMS and recieved a nebulizer treatment en route to [facility].    In the ED, patient was given solumedrol 40 IV and duonebs. She has been admitted to medicine for further management. (26 Aug 2019 15:56)      PMHx:   TIA (transient ischemic attack)  DVT (deep venous thrombosis)  Seizure  Rectal bleeding  Aortic disease  Colorectal cancer  Tracheobronchomalacia  Hypertension  Asthma  Pelvic fracture  Aortic insufficiency  Adrenal insufficiency  COPD (chronic obstructive pulmonary disease)  Hypertension  Diabetes  Atrial fibrillation      PSHx:   Rectal bleeding  S/P bronchoscopy  History of tracheomalacia  History of surgery  H/O pelvic surgery  Exostosis of orbit, left  History of sinus surgery  H/O total knee replacement, bilateral  History of partial hysterectomy      Allergies:  ampicillin (Short breath)  aspirin (Short breath)  Avelox (Short breath; Pruritus)  codeine (Short breath)  Dilaudid (Short breath)  iodine (Short breath; Swelling)  penicillin (Short breath)  shellfish (Anaphylaxis)  tetanus toxoid (Short breath)  Valium (Short breath)      Home Meds:    Current Medications:   acetaminophen   Tablet .. 650 milliGRAM(s) Oral every 6 hours PRN  ALBUTerol/ipratropium for Nebulization 3 milliLiter(s) Nebulizer every 6 hours  apixaban 5 milliGRAM(s) Oral every 12 hours  atorvastatin 40 milliGRAM(s) Oral at bedtime  buDESOnide    Inhalation Suspension 0.5 milliGRAM(s) Inhalation daily  dextrose 40% Gel 15 Gram(s) Oral once PRN  dextrose 5%. 1000 milliLiter(s) IV Continuous <Continuous>  dextrose 50% Injectable 12.5 Gram(s) IV Push once  dextrose 50% Injectable 25 Gram(s) IV Push once  dextrose 50% Injectable 25 Gram(s) IV Push once  digoxin     Tablet 0.25 milliGRAM(s) Oral daily  docusate sodium 100 milliGRAM(s) Oral daily  glucagon  Injectable 1 milliGRAM(s) IntraMuscular once PRN  hydrocortisone 1% Cream 1 Application(s) Topical two times a day  insulin glargine Injectable (LANTUS) 10 Unit(s) SubCutaneous every morning  insulin lispro (HumaLOG) corrective regimen sliding scale   SubCutaneous three times a day before meals  insulin lispro (HumaLOG) corrective regimen sliding scale   SubCutaneous at bedtime  montelukast 10 milliGRAM(s) Oral daily  neomycin/BACItracin/polymyxin Topical Ointment 1 Application(s) Topical daily  pantoprazole    Tablet 40 milliGRAM(s) Oral before breakfast  polyethylene glycol 3350 17 Gram(s) Oral daily  sodium chloride 0.65% Nasal 1 Spray(s) Both Nostrils two times a day PRN  tiotropium 18 MICROgram(s) Capsule 1 Capsule(s) Inhalation daily      FAMILY HISTORY:  Family history of diabetes mellitus type II  Family history of breast cancer (Sibling)  Family history of asthma      Social History:  Smoking History:  Alcohol Use:  Drug Use:    REVIEW OF SYSTEMS:  Constitutional:     [ ] negative [ ] fevers [ ] chills [ ] weight loss [ ] weight gain  HEENT:                  [ ] negative [ ] dry eyes [ ] eye irritation [ ] postnasal drip [ ] nasal congestion  CV:                         [ ] negative  [ ] chest pain [ ] orthopnea [ ] palpitations [ ] murmur  Resp:                     [ ] negative [ ] cough [ ] shortness of breath [ ] dyspnea [ ] wheezing [ ] sputum [ ]hemoptysis  GI:                          [ ] negative [ ] nausea [ ] vomiting [ ] diarrhea [ ] constipation [ ] abd pain [ ] dysphagia   :                        [ ] negative [ ] dysuria [ ] nocturia [ ] hematuria [ ] increased urinary frequency  Musculoskeletal: [ ] negative [ ] back pain [ ] myalgias [ ] arthralgias [ ] fracture  Skin:                       [ ] negative [ ] rash [ ] itch  Neurological:        [ ] negative [ ] headache [ ] dizziness [ ] syncope [ ] weakness [ ] numbness  Psychiatric:           [ ] negative [ ] anxiety [ ] depression  Endocrine:            [ ] negative [ ] diabetes [ ] thyroid problem  Heme/Lymph:      [ ] negative [ ] anemia [ ] bleeding problem  Allergic/Immune: [ ] negative [ ] itchy eyes [ ] nasal discharge [ ] hives [ ] angioedema    [ ] All other systems negative  [ ] Unable to assess ROS due to      Physical Exam:  T(F): 97.9 (08-27), Max: 98.3 (08-26)  HR: 79 (08-27) (68 - 93)  BP: 111/68 (08-27) (111/68 - 136/69)  RR: 18 (08-27)  SpO2: 97% (08-27)  GENERAL: No acute distress, well-developed  HEAD:  Atraumatic, Normocephalic  ENT: EOMI, PERRLA, conjunctiva and sclera clear, Neck supple, No JVD, moist mucosa  CHEST/LUNG: Clear to auscultation bilaterally; No wheeze, equal breath sounds bilaterally   BACK: No spinal tenderness  HEART: Regular rate and rhythm; No murmurs, rubs, or gallops  ABDOMEN: Soft, Nontender, Nondistended; Bowel sounds present  EXTREMITIES:  No clubbing, cyanosis, or edema  PSYCH: Nl behavior, nl affect  NEUROLOGY: AAOx3, non-focal, cranial nerves intact  SKIN: Normal color, No rashes or lesions  LINES:    Cardiovascular Diagnostic Testing:    ECG: Personally reviewed:    Echo: Personally reviewed:    Stress Testing:    Cath:    Imaging:    CXR: Personally reviewed    Labs: Personally reviewed                        11.7   5.4   )-----------( 244      ( 27 Aug 2019 07:40 )             39.2     08-27    140  |  103  |  10  ----------------------------<  101<H>  3.8   |  27  |  0.66    Ca    9.3      27 Aug 2019 07:40  Phos  3.2     08-27  Mg     2.2     08-27    TPro  6.5  /  Alb  3.7  /  TBili  0.2  /  DBili  x   /  AST  9<L>  /  ALT  8<L>  /  AlkPhos  102  08-27    PT/INR - ( 26 Aug 2019 12:14 )   PT: 13.5 sec;   INR: 1.17 ratio         PTT - ( 26 Aug 2019 12:14 )  PTT:39.4 sec  Serum Pro-Brain Natriuretic Peptide: 58 pg/mL (08-26 @ 18:25)      Hemoglobin A1C, Whole Blood: 6.8 % (08-27 @ 09:32) Patient seen and evaluated at bedside    Chief Complaint:    HPI:  The patient is a 69 y/o woman with asthma (on long term medrol) c/b adrenal insufficiency, afib on eliquis, DM2, TIA, colon cancer (s/p resection, chemo, RT), tracheobronchomalacia s/p bronchial thermoplasty who presented to the ED with SOB and dyspnea on exertion. Patient states this has been getting progressively worse for months. She had a V/Q scan as an outpatient as well as a CT chest. V/Q was low possibility of PE, CT chest showed two stable right lung nodules and a RML pleural opacity that is unchanged from last study. Patient reports cough, productive of yellowish sputum. No fever, chills, sick contacts. No nausea, vomiting, diarrhea, hematuria, dysuria, chest pain. No recent travel. Patient called EMS and recieved a nebulizer treatment en route to [facility].    In the ED, patient was given solumedrol 40 IV and duonebs. She has been admitted to medicine for further management. (26 Aug 2019 15:56)      PMHx:   TIA (transient ischemic attack)  DVT (deep venous thrombosis)  Seizure  Rectal bleeding  Aortic disease  Colorectal cancer  Tracheobronchomalacia  Hypertension  Asthma  Pelvic fracture  Aortic insufficiency  Adrenal insufficiency  COPD (chronic obstructive pulmonary disease)  Hypertension  Diabetes  Atrial fibrillation      PSHx:   Rectal bleeding  S/P bronchoscopy  History of tracheomalacia  History of surgery  H/O pelvic surgery  Exostosis of orbit, left  History of sinus surgery  H/O total knee replacement, bilateral  History of partial hysterectomy      Allergies:  ampicillin (Short breath)  aspirin (Short breath)  Avelox (Short breath; Pruritus)  codeine (Short breath)  Dilaudid (Short breath)  iodine (Short breath; Swelling)  penicillin (Short breath)  shellfish (Anaphylaxis)  tetanus toxoid (Short breath)  Valium (Short breath)      Home Meds:    Current Medications:   acetaminophen   Tablet .. 650 milliGRAM(s) Oral every 6 hours PRN  ALBUTerol/ipratropium for Nebulization 3 milliLiter(s) Nebulizer every 6 hours  apixaban 5 milliGRAM(s) Oral every 12 hours  atorvastatin 40 milliGRAM(s) Oral at bedtime  buDESOnide    Inhalation Suspension 0.5 milliGRAM(s) Inhalation daily  dextrose 40% Gel 15 Gram(s) Oral once PRN  dextrose 5%. 1000 milliLiter(s) IV Continuous <Continuous>  dextrose 50% Injectable 12.5 Gram(s) IV Push once  dextrose 50% Injectable 25 Gram(s) IV Push once  dextrose 50% Injectable 25 Gram(s) IV Push once  digoxin     Tablet 0.25 milliGRAM(s) Oral daily  docusate sodium 100 milliGRAM(s) Oral daily  glucagon  Injectable 1 milliGRAM(s) IntraMuscular once PRN  hydrocortisone 1% Cream 1 Application(s) Topical two times a day  insulin glargine Injectable (LANTUS) 10 Unit(s) SubCutaneous every morning  insulin lispro (HumaLOG) corrective regimen sliding scale   SubCutaneous three times a day before meals  insulin lispro (HumaLOG) corrective regimen sliding scale   SubCutaneous at bedtime  montelukast 10 milliGRAM(s) Oral daily  neomycin/BACItracin/polymyxin Topical Ointment 1 Application(s) Topical daily  pantoprazole    Tablet 40 milliGRAM(s) Oral before breakfast  polyethylene glycol 3350 17 Gram(s) Oral daily  sodium chloride 0.65% Nasal 1 Spray(s) Both Nostrils two times a day PRN  tiotropium 18 MICROgram(s) Capsule 1 Capsule(s) Inhalation daily      FAMILY HISTORY:  Family history of diabetes mellitus type II  Family history of breast cancer (Sibling)  Family history of asthma      Social History:  Smoking History:  Alcohol Use:  Drug Use:    REVIEW OF SYSTEMS:  Constitutional:     [ ] negative [ ] fevers [ ] chills [ ] weight loss [ ] weight gain  HEENT:                  [ ] negative [ ] dry eyes [ ] eye irritation [ ] postnasal drip [ ] nasal congestion  CV:                         [ ] negative  [ ] chest pain [ ] orthopnea [ ] palpitations [ ] murmur  Resp:                     [ ] negative [ ] cough [ ] shortness of breath [ ] dyspnea [ ] wheezing [ ] sputum [ ]hemoptysis  GI:                          [ ] negative [ ] nausea [ ] vomiting [ ] diarrhea [ ] constipation [ ] abd pain [ ] dysphagia   :                        [ ] negative [ ] dysuria [ ] nocturia [ ] hematuria [ ] increased urinary frequency  Musculoskeletal: [ ] negative [ ] back pain [ ] myalgias [ ] arthralgias [ ] fracture  Skin:                       [ ] negative [ ] rash [ ] itch  Neurological:        [ ] negative [ ] headache [ ] dizziness [ ] syncope [ ] weakness [ ] numbness  Psychiatric:           [ ] negative [ ] anxiety [ ] depression  Endocrine:            [ ] negative [ ] diabetes [ ] thyroid problem  Heme/Lymph:      [ ] negative [ ] anemia [ ] bleeding problem  Allergic/Immune: [ ] negative [ ] itchy eyes [ ] nasal discharge [ ] hives [ ] angioedema    [ ] All other systems negative  [ ] Unable to assess ROS due to      Physical Exam:  T(F): 97.9 (08-27), Max: 98.3 (08-26)  HR: 79 (08-27) (68 - 93)  BP: 111/68 (08-27) (111/68 - 136/69)  RR: 18 (08-27)  SpO2: 97% (08-27)  GENERAL: No acute distress, well-developed  HEAD:  Atraumatic, Normocephalic  ENT: EOMI, PERRLA, conjunctiva and sclera clear, Neck supple, No JVD, moist mucosa  CHEST/LUNG: Clear to auscultation bilaterally; No wheeze, equal breath sounds bilaterally   BACK: No spinal tenderness  HEART: Regular rate and rhythm; No murmurs, rubs, or gallops (prominent long diastolic blow along LSB - RPS)	  ABDOMEN: Soft, Nontender, Nondistended; Bowel sounds present  EXTREMITIES:  No clubbing, cyanosis, or edema  PSYCH: Nl behavior, nl affect  NEUROLOGY: AAOx3, non-focal, cranial nerves intact  SKIN: Normal color, No rashes or lesions  LINES:    Cardiovascular Diagnostic Testing:    ECG: Personally reviewed:    Echo: Personally reviewed:    Stress Testing:    Cath:    Imaging:    CXR: Personally reviewed    Labs: Personally reviewed                        11.7   5.4   )-----------( 244      ( 27 Aug 2019 07:40 )             39.2     08-27    140  |  103  |  10  ----------------------------<  101<H>  3.8   |  27  |  0.66    Ca    9.3      27 Aug 2019 07:40  Phos  3.2     08-27  Mg     2.2     08-27    TPro  6.5  /  Alb  3.7  /  TBili  0.2  /  DBili  x   /  AST  9<L>  /  ALT  8<L>  /  AlkPhos  102  08-27    PT/INR - ( 26 Aug 2019 12:14 )   PT: 13.5 sec;   INR: 1.17 ratio         PTT - ( 26 Aug 2019 12:14 )  PTT:39.4 sec  Serum Pro-Brain Natriuretic Peptide: 58 pg/mL (08-26 @ 18:25)      Hemoglobin A1C, Whole Blood: 6.8 % (08-27 @ 09:32) Patient seen and evaluated at bedside    Chief Complaint:    HPI:  68F PMH long-standing asthma with multiple hospitalizations and intubations on long-term medrol, tracheobronchomalacia s/p bronchial thermoplasty, adrenal insufficiency, afib on eliquis, colon CA s/p resection, chemoradiation; DM2, TIA who presented for shortness of breath worsening over months. Patient states that her breathing began deteriorating in February 2019. At that time, she required no duoneb treatments, but since then her requirement has steadily increased to three daily. Her functional status, however, has remained stable; she is able to walk     In ED, vitals,   patient was given solumedrol and duonebs. RVP positive for enterovirus.     The patient is a 69 y/o woman with asthma (on long term medrol) c/b adrenal insufficiency, afib on eliquis, DM2, TIA, colon cancer (s/p resection, chemo, RT), tracheobronchomalacia s/p bronchial thermoplasty who presented to the ED with SOB and dyspnea on exertion. Patient states this has been getting progressively worse for months. She had a V/Q scan as an outpatient as well as a CT chest. V/Q was low possibility of PE, CT chest showed two stable right lung nodules and a RML pleural opacity that is unchanged from last study. Patient reports cough, productive of yellowish sputum. No fever, chills, sick contacts. No nausea, vomiting, diarrhea, hematuria, dysuria, chest pain. No recent travel. Patient called EMS and recieved a nebulizer treatment en route to [facility].    In the ED, patient was given solumedrol 40 IV and duonebs. She has been admitted to medicine for further management. (26 Aug 2019 15:56)      PMHx:   TIA (transient ischemic attack)  DVT (deep venous thrombosis)  Seizure  Rectal bleeding  Aortic disease  Colorectal cancer  Tracheobronchomalacia  Hypertension  Asthma  Pelvic fracture  Aortic insufficiency  Adrenal insufficiency  COPD (chronic obstructive pulmonary disease)  Hypertension  Diabetes  Atrial fibrillation      PSHx:   Rectal bleeding  S/P bronchoscopy  History of tracheomalacia  History of surgery  H/O pelvic surgery  Exostosis of orbit, left  History of sinus surgery  H/O total knee replacement, bilateral  History of partial hysterectomy      Allergies:  ampicillin (Short breath)  aspirin (Short breath)  Avelox (Short breath; Pruritus)  codeine (Short breath)  Dilaudid (Short breath)  iodine (Short breath; Swelling)  penicillin (Short breath)  shellfish (Anaphylaxis)  tetanus toxoid (Short breath)  Valium (Short breath)      Home Meds:    Current Medications:   acetaminophen   Tablet .. 650 milliGRAM(s) Oral every 6 hours PRN  ALBUTerol/ipratropium for Nebulization 3 milliLiter(s) Nebulizer every 6 hours  apixaban 5 milliGRAM(s) Oral every 12 hours  atorvastatin 40 milliGRAM(s) Oral at bedtime  buDESOnide    Inhalation Suspension 0.5 milliGRAM(s) Inhalation daily  dextrose 40% Gel 15 Gram(s) Oral once PRN  dextrose 5%. 1000 milliLiter(s) IV Continuous <Continuous>  dextrose 50% Injectable 12.5 Gram(s) IV Push once  dextrose 50% Injectable 25 Gram(s) IV Push once  dextrose 50% Injectable 25 Gram(s) IV Push once  digoxin     Tablet 0.25 milliGRAM(s) Oral daily  docusate sodium 100 milliGRAM(s) Oral daily  glucagon  Injectable 1 milliGRAM(s) IntraMuscular once PRN  hydrocortisone 1% Cream 1 Application(s) Topical two times a day  insulin glargine Injectable (LANTUS) 10 Unit(s) SubCutaneous every morning  insulin lispro (HumaLOG) corrective regimen sliding scale   SubCutaneous three times a day before meals  insulin lispro (HumaLOG) corrective regimen sliding scale   SubCutaneous at bedtime  montelukast 10 milliGRAM(s) Oral daily  neomycin/BACItracin/polymyxin Topical Ointment 1 Application(s) Topical daily  pantoprazole    Tablet 40 milliGRAM(s) Oral before breakfast  polyethylene glycol 3350 17 Gram(s) Oral daily  sodium chloride 0.65% Nasal 1 Spray(s) Both Nostrils two times a day PRN  tiotropium 18 MICROgram(s) Capsule 1 Capsule(s) Inhalation daily      FAMILY HISTORY:  Family history of diabetes mellitus type II  Family history of breast cancer (Sibling)  Family history of asthma      Social History:  Smoking History:  Alcohol Use:  Drug Use:    REVIEW OF SYSTEMS:  Constitutional:     [ ] negative [ ] fevers [ ] chills [ ] weight loss [ ] weight gain  HEENT:                  [ ] negative [ ] dry eyes [ ] eye irritation [ ] postnasal drip [ ] nasal congestion  CV:                         [ ] negative  [ ] chest pain [ ] orthopnea [ ] palpitations [ ] murmur  Resp:                     [ ] negative [ ] cough [ ] shortness of breath [ ] dyspnea [ ] wheezing [ ] sputum [ ]hemoptysis  GI:                          [ ] negative [ ] nausea [ ] vomiting [ ] diarrhea [ ] constipation [ ] abd pain [ ] dysphagia   :                        [ ] negative [ ] dysuria [ ] nocturia [ ] hematuria [ ] increased urinary frequency  Musculoskeletal: [ ] negative [ ] back pain [ ] myalgias [ ] arthralgias [ ] fracture  Skin:                       [ ] negative [ ] rash [ ] itch  Neurological:        [ ] negative [ ] headache [ ] dizziness [ ] syncope [ ] weakness [ ] numbness  Psychiatric:           [ ] negative [ ] anxiety [ ] depression  Endocrine:            [ ] negative [ ] diabetes [ ] thyroid problem  Heme/Lymph:      [ ] negative [ ] anemia [ ] bleeding problem  Allergic/Immune: [ ] negative [ ] itchy eyes [ ] nasal discharge [ ] hives [ ] angioedema    [ ] All other systems negative  [ ] Unable to assess ROS due to      Physical Exam:  T(F): 97.9 (08-27), Max: 98.3 (08-26)  HR: 79 (08-27) (68 - 93)  BP: 111/68 (08-27) (111/68 - 136/69)  RR: 18 (08-27)  SpO2: 97% (08-27)  GENERAL: No acute distress, well-developed  HEAD:  Atraumatic, Normocephalic  ENT: EOMI, PERRLA, conjunctiva and sclera clear, Neck supple, No JVD, moist mucosa  CHEST/LUNG: Clear to auscultation bilaterally; No wheeze, equal breath sounds bilaterally   BACK: No spinal tenderness  HEART: Regular rate and rhythm; No murmurs, rubs, or gallops (prominent long diastolic blow along LSB - RPS)	  ABDOMEN: Soft, Nontender, Nondistended; Bowel sounds present  EXTREMITIES:  No clubbing, cyanosis, or edema  PSYCH: Nl behavior, nl affect  NEUROLOGY: AAOx3, non-focal, cranial nerves intact  SKIN: Normal color, No rashes or lesions  LINES:    Cardiovascular Diagnostic Testing:    ECG: Personally reviewed:    Echo: Personally reviewed:    Stress Testing:    Cath:    Imaging:    CXR: Personally reviewed    Labs: Personally reviewed                        11.7   5.4   )-----------( 244      ( 27 Aug 2019 07:40 )             39.2     08-27    140  |  103  |  10  ----------------------------<  101<H>  3.8   |  27  |  0.66    Ca    9.3      27 Aug 2019 07:40  Phos  3.2     08-27  Mg     2.2     08-27    TPro  6.5  /  Alb  3.7  /  TBili  0.2  /  DBili  x   /  AST  9<L>  /  ALT  8<L>  /  AlkPhos  102  08-27    PT/INR - ( 26 Aug 2019 12:14 )   PT: 13.5 sec;   INR: 1.17 ratio         PTT - ( 26 Aug 2019 12:14 )  PTT:39.4 sec  Serum Pro-Brain Natriuretic Peptide: 58 pg/mL (08-26 @ 18:25)      Hemoglobin A1C, Whole Blood: 6.8 % (08-27 @ 09:32) Patient seen and evaluated at bedside    Chief Complaint:    HPI:  68F PMH long-standing asthma with multiple hospitalizations and intubations on long-term medrol, moderate-severe AI known since at least 2016, tracheobronchomalacia s/p bronchial thermoplasty, adrenal insufficiency, afib on eliquis, colon CA s/p resection, chemoradiation; DM2, TIA who presented for shortness of breath worsening over months. Patient states that her breathing began deteriorating in February 2019. At that time, she required no duoneb treatments, but since then her requirement has steadily increased to three daily. Her exercise capacity has decreased from 2 blocks before February to less than 1 block recently. She denies CP, leg swelling, orthopnea. 1d PTA, she developed productive cough and SOB.  Pt states that her usual asthma exacerbations present with worsening SOB and cough productive with green/yellow sputum.     In ED, patient was given solumedrol and duonebs x3. RVP positive for enterovirus.       PMHx:  TIA (transient ischemic attack)  DVT (deep venous thrombosis)  Seizure  Rectal bleeding  Aortic disease  Colorectal cancer  Tracheobronchomalacia  Hypertension  Asthma  Pelvic fracture  Aortic insufficiency  Adrenal insufficiency  COPD (chronic obstructive pulmonary disease)  Hypertension  Diabetes  Atrial fibrillation      PSHx:   Rectal bleeding  S/P bronchoscopy  History of tracheomalacia  History of surgery  H/O pelvic surgery  Exostosis of orbit, left  History of sinus surgery  H/O total knee replacement, bilateral  History of partial hysterectomy      Allergies:  ampicillin (Short breath)  aspirin (Short breath)  Avelox (Short breath; Pruritus)  codeine (Short breath)  Dilaudid (Short breath)  iodine (Short breath; Swelling)  penicillin (Short breath)  shellfish (Anaphylaxis)  tetanus toxoid (Short breath)  Valium (Short breath)      Home Meds:    Current Medications:   acetaminophen   Tablet .. 650 milliGRAM(s) Oral every 6 hours PRN  ALBUTerol/ipratropium for Nebulization 3 milliLiter(s) Nebulizer every 6 hours  apixaban 5 milliGRAM(s) Oral every 12 hours  atorvastatin 40 milliGRAM(s) Oral at bedtime  buDESOnide    Inhalation Suspension 0.5 milliGRAM(s) Inhalation daily  dextrose 40% Gel 15 Gram(s) Oral once PRN  dextrose 5%. 1000 milliLiter(s) IV Continuous <Continuous>  dextrose 50% Injectable 12.5 Gram(s) IV Push once  dextrose 50% Injectable 25 Gram(s) IV Push once  dextrose 50% Injectable 25 Gram(s) IV Push once  digoxin     Tablet 0.25 milliGRAM(s) Oral daily  docusate sodium 100 milliGRAM(s) Oral daily  glucagon  Injectable 1 milliGRAM(s) IntraMuscular once PRN  hydrocortisone 1% Cream 1 Application(s) Topical two times a day  insulin glargine Injectable (LANTUS) 10 Unit(s) SubCutaneous every morning  insulin lispro (HumaLOG) corrective regimen sliding scale   SubCutaneous three times a day before meals  insulin lispro (HumaLOG) corrective regimen sliding scale   SubCutaneous at bedtime  montelukast 10 milliGRAM(s) Oral daily  neomycin/BACItracin/polymyxin Topical Ointment 1 Application(s) Topical daily  pantoprazole    Tablet 40 milliGRAM(s) Oral before breakfast  polyethylene glycol 3350 17 Gram(s) Oral daily  sodium chloride 0.65% Nasal 1 Spray(s) Both Nostrils two times a day PRN  tiotropium 18 MICROgram(s) Capsule 1 Capsule(s) Inhalation daily      FAMILY HISTORY:  Family history of diabetes mellitus type II  Family history of breast cancer (Sibling)  Family history of asthma      Social History:  Smoking History:  Alcohol Use:  Drug Use:    REVIEW OF SYSTEMS:  Constitutional:     [ ] negative [ ] fevers [ ] chills [ ] weight loss [ ] weight gain  HEENT:                  [ ] negative [ ] dry eyes [ ] eye irritation [ ] postnasal drip [ ] nasal congestion  CV:                         [ ] negative  [ ] chest pain [ ] orthopnea [ ] palpitations [ ] murmur  Resp:                     [ ] negative [ ] cough [ ] shortness of breath [ ] dyspnea [ ] wheezing [ ] sputum [ ]hemoptysis  GI:                          [ ] negative [ ] nausea [ ] vomiting [ ] diarrhea [ ] constipation [ ] abd pain [ ] dysphagia   :                        [ ] negative [ ] dysuria [ ] nocturia [ ] hematuria [ ] increased urinary frequency  Musculoskeletal: [ ] negative [ ] back pain [ ] myalgias [ ] arthralgias [ ] fracture  Skin:                       [ ] negative [ ] rash [ ] itch  Neurological:        [ ] negative [ ] headache [ ] dizziness [ ] syncope [ ] weakness [ ] numbness  Psychiatric:           [ ] negative [ ] anxiety [ ] depression  Endocrine:            [ ] negative [ ] diabetes [ ] thyroid problem  Heme/Lymph:      [ ] negative [ ] anemia [ ] bleeding problem  Allergic/Immune: [ ] negative [ ] itchy eyes [ ] nasal discharge [ ] hives [ ] angioedema    [ ] All other systems negative  [ ] Unable to assess ROS due to      Physical Exam:  T(F): 97.9 (08-27), Max: 98.3 (08-26)  HR: 79 (08-27) (68 - 93)  BP: 111/68 (08-27) (111/68 - 136/69)  RR: 18 (08-27)  SpO2: 97% (08-27)  GENERAL: No acute distress, well-developed  HEAD:  Atraumatic, Normocephalic  ENT: EOMI, PERRLA, conjunctiva and sclera clear, Neck supple, No JVD, moist mucosa  CHEST/LUNG: Clear to auscultation bilaterally; No wheeze, equal breath sounds bilaterally   BACK: No spinal tenderness  HEART: Regular rate and rhythm; No murmurs, rubs, or gallops (prominent long diastolic blow along LSB - RPS)	  ABDOMEN: Soft, Nontender, Nondistended; Bowel sounds present  EXTREMITIES:  No clubbing, cyanosis, or edema  PSYCH: Nl behavior, nl affect  NEUROLOGY: AAOx3, non-focal, cranial nerves intact  SKIN: Normal color, No rashes or lesions  LINES:    Cardiovascular Diagnostic Testing:    ECG: Personally reviewed:    Echo: Personally reviewed:    Stress Testing:    Cath:    Imaging:    CXR: Personally reviewed    Labs: Personally reviewed                        11.7   5.4   )-----------( 244      ( 27 Aug 2019 07:40 )             39.2     08-27    140  |  103  |  10  ----------------------------<  101<H>  3.8   |  27  |  0.66    Ca    9.3      27 Aug 2019 07:40  Phos  3.2     08-27  Mg     2.2     08-27    TPro  6.5  /  Alb  3.7  /  TBili  0.2  /  DBili  x   /  AST  9<L>  /  ALT  8<L>  /  AlkPhos  102  08-27    PT/INR - ( 26 Aug 2019 12:14 )   PT: 13.5 sec;   INR: 1.17 ratio         PTT - ( 26 Aug 2019 12:14 )  PTT:39.4 sec  Serum Pro-Brain Natriuretic Peptide: 58 pg/mL (08-26 @ 18:25)      Hemoglobin A1C, Whole Blood: 6.8 % (08-27 @ 09:32) Patient seen and evaluated at bedside    Chief Complaint:    HPI:  68F PMH long-standing asthma with multiple hospitalizations and intubations on long-term medrol, moderate-severe AI known since at least 2016, tracheobronchomalacia s/p bronchial thermoplasty, adrenal insufficiency, afib on eliquis, colon CA s/p resection, chemoradiation; DM2, TIA who presented for shortness of breath worsening over months. Patient states that her breathing began deteriorating in February 2019. At that time, she required no duoneb treatments, but since then her requirement has steadily increased to three daily. Her exercise capacity has decreased from 2 blocks before February to less than 1 block recently. She denies CP, leg swelling, orthopnea. 1d PTA, she developed productive cough and SOB.  Pt states that her usual asthma exacerbations present with worsening SOB and cough productive with green/yellow sputum.     In ED, patient was given solumedrol and duonebs x3. RVP positive for enterovirus.       PMHx:  TIA (transient ischemic attack)  DVT (deep venous thrombosis)  Seizure  Rectal bleeding  Aortic disease  Colorectal cancer  Tracheobronchomalacia  Hypertension  Asthma  Pelvic fracture  Aortic insufficiency  Adrenal insufficiency  COPD (chronic obstructive pulmonary disease)  Hypertension  Diabetes  Atrial fibrillation      PSHx:   Rectal bleeding  S/P bronchoscopy  History of tracheomalacia  History of surgery  H/O pelvic surgery  Exostosis of orbit, left  History of sinus surgery  H/O total knee replacement, bilateral  History of partial hysterectomy      Allergies:  ampicillin (Short breath)  aspirin (Short breath)  Avelox (Short breath; Pruritus)  codeine (Short breath)  Dilaudid (Short breath)  iodine (Short breath; Swelling)  penicillin (Short breath)  shellfish (Anaphylaxis)  tetanus toxoid (Short breath)  Valium (Short breath)      Home Meds:    Current Medications:   acetaminophen   Tablet .. 650 milliGRAM(s) Oral every 6 hours PRN  ALBUTerol/ipratropium for Nebulization 3 milliLiter(s) Nebulizer every 6 hours  apixaban 5 milliGRAM(s) Oral every 12 hours  atorvastatin 40 milliGRAM(s) Oral at bedtime  buDESOnide    Inhalation Suspension 0.5 milliGRAM(s) Inhalation daily  dextrose 40% Gel 15 Gram(s) Oral once PRN  dextrose 5%. 1000 milliLiter(s) IV Continuous <Continuous>  dextrose 50% Injectable 12.5 Gram(s) IV Push once  dextrose 50% Injectable 25 Gram(s) IV Push once  dextrose 50% Injectable 25 Gram(s) IV Push once  digoxin     Tablet 0.25 milliGRAM(s) Oral daily  docusate sodium 100 milliGRAM(s) Oral daily  glucagon  Injectable 1 milliGRAM(s) IntraMuscular once PRN  hydrocortisone 1% Cream 1 Application(s) Topical two times a day  insulin glargine Injectable (LANTUS) 10 Unit(s) SubCutaneous every morning  insulin lispro (HumaLOG) corrective regimen sliding scale   SubCutaneous three times a day before meals  insulin lispro (HumaLOG) corrective regimen sliding scale   SubCutaneous at bedtime  montelukast 10 milliGRAM(s) Oral daily  neomycin/BACItracin/polymyxin Topical Ointment 1 Application(s) Topical daily  pantoprazole    Tablet 40 milliGRAM(s) Oral before breakfast  polyethylene glycol 3350 17 Gram(s) Oral daily  sodium chloride 0.65% Nasal 1 Spray(s) Both Nostrils two times a day PRN  tiotropium 18 MICROgram(s) Capsule 1 Capsule(s) Inhalation daily      FAMILY HISTORY:  Family history of diabetes mellitus type II  Family history of breast cancer (Sibling)  Family history of asthma      Social History:  Smoking History:  Alcohol Use:  Drug Use:    REVIEW OF SYSTEMS:  Constitutional:     [ ] negative [ ] fevers [ ] chills [ ] weight loss [ ] weight gain  HEENT:                  [ ] negative [ ] dry eyes [ ] eye irritation [ ] postnasal drip [ ] nasal congestion  CV:                         [ ] negative  [ ] chest pain [ ] orthopnea [ ] palpitations [ ] murmur  Resp:                     [ ] negative [ ] cough [ ] shortness of breath [ ] dyspnea [ ] wheezing [ ] sputum [ ]hemoptysis  GI:                          [ ] negative [ ] nausea [ ] vomiting [ ] diarrhea [ ] constipation [ ] abd pain [ ] dysphagia   :                        [ ] negative [ ] dysuria [ ] nocturia [ ] hematuria [ ] increased urinary frequency  Musculoskeletal: [ ] negative [ ] back pain [ ] myalgias [ ] arthralgias [ ] fracture  Skin:                       [ ] negative [ ] rash [ ] itch  Neurological:        [ ] negative [ ] headache [ ] dizziness [ ] syncope [ ] weakness [ ] numbness  Psychiatric:           [ ] negative [ ] anxiety [ ] depression  Endocrine:            [ ] negative [ ] diabetes [ ] thyroid problem  Heme/Lymph:      [ ] negative [ ] anemia [ ] bleeding problem  Allergic/Immune: [ ] negative [ ] itchy eyes [ ] nasal discharge [ ] hives [ ] angioedema    [ ] All other systems negative  [ ] Unable to assess ROS due to      Physical Exam:  T(F): 97.9 (08-27), Max: 98.3 (08-26)  HR: 79 (08-27) (68 - 93)  BP: 111/68 (08-27) (111/68 - 136/69)  RR: 18 (08-27)  SpO2: 97% (08-27)  GENERAL: No acute distress, well-developed  HEAD:  Atraumatic, Normocephalic  ENT: EOMI, PERRLA, conjunctiva and sclera clear, Neck supple, No JVD, moist mucosa  CHEST/LUNG: Clear to auscultation bilaterally; No wheeze, equal breath sounds bilaterally   BACK: No spinal tenderness  HEART: Regular rate and rhythm; No murmurs, rubs, or gallops (prominent long diastolic blow along LSB - RPS)	  ABDOMEN: Soft, Nontender, Nondistended; Bowel sounds present  EXTREMITIES:  No clubbing, cyanosis, or edema  PSYCH: Nl behavior, nl affect  NEUROLOGY: AAOx3, non-focal, cranial nerves intact  SKIN: Normal color, No rashes or lesions  LINES:    Cardiovascular Diagnostic Testing:    ECG: < from: 12 Lead ECG (08.26.19 @ 11:37) >  NORMAL SINUS RHYTHM    < end of copied text >      Echo: < from: Transthoracic Echocardiogram (08.23.19 @ 15:29) >  LA:     3.8    2.0 - 4.0 cm  Ao:     3.9    2.0 - 3.8 cm  SEPTUM: 1.1    0.6 - 1.2 cm  PWT:    1.0    0.6 - 1.1 cm  LVIDd:  4.2    3.0 - 5.6 cm  LVIDs:  2.8    1.8 - 4.0 cm  Derived variables:  LVMI: 85 g/m2  RWT: 0.47  Fractional short: 33 %  EF (Visual Estimate): 60-65 %  Doppler Peak Velocity (m/sec): AoV=1.8  ------------------------------------------------------------------------  Observations:  Mitral Valve: Mitral annular calcification, otherwise  normal mitral valve. Minimal mitral regurgitation.  Aortic Valve/Aorta: Calcified trileaflet aortic valve with  normal opening. Peak transaortic valve gradient equals 13  mm Hg. Moderate-severe aortic regurgitation. Peak left  ventricular outflow tract gradient equals 7 mm Hg.  Aortic Root: 3.9 cm.  Left Atrium: Moderate left atrial enlargement.  Left Ventricle: Normal left ventricular systolic function.  No segmental wall motion abnormalities. Normal left  ventricular internal dimensions and wall thicknesses.  Reversal of the E-A  waves of the mitral inflow pattern is  consistent with diastolic LV dysfunction.  Right Heart: Normal right atrium.Normal right ventricular  size and function. Normal tricuspid valve. Minimal  tricuspid regurgitation. Normal pulmonic valve.  Pericardium/Pleura: Normal pericardium with no pericardial  effusion.  Hemodynamic: Estimated right atrial pressure is 8 mmHg.  Estimated right ventricular systolic pressure equals 26 mm  Hg, assuming right atrial pressure equals 8 mm Hg,  consistent with normal pulmonary pressures.    < end of copied text >      CXR: Personally reviewed    Labs: Personally reviewed                        11.7   5.4   )-----------( 244      ( 27 Aug 2019 07:40 )             39.2     08-27    140  |  103  |  10  ----------------------------<  101<H>  3.8   |  27  |  0.66    Ca    9.3      27 Aug 2019 07:40  Phos  3.2     08-27  Mg     2.2     08-27    TPro  6.5  /  Alb  3.7  /  TBili  0.2  /  DBili  x   /  AST  9<L>  /  ALT  8<L>  /  AlkPhos  102  08-27    PT/INR - ( 26 Aug 2019 12:14 )   PT: 13.5 sec;   INR: 1.17 ratio         PTT - ( 26 Aug 2019 12:14 )  PTT:39.4 sec  Serum Pro-Brain Natriuretic Peptide: 58 pg/mL (08-26 @ 18:25)      Hemoglobin A1C, Whole Blood: 6.8 % (08-27 @ 09:32)

## 2019-08-28 LAB
ANION GAP SERPL CALC-SCNC: 15 MMOL/L — SIGNIFICANT CHANGE UP (ref 5–17)
BUN SERPL-MCNC: 14 MG/DL — SIGNIFICANT CHANGE UP (ref 7–23)
CALCIUM SERPL-MCNC: 9.3 MG/DL — SIGNIFICANT CHANGE UP (ref 8.4–10.5)
CHLORIDE SERPL-SCNC: 103 MMOL/L — SIGNIFICANT CHANGE UP (ref 96–108)
CO2 SERPL-SCNC: 25 MMOL/L — SIGNIFICANT CHANGE UP (ref 22–31)
CREAT SERPL-MCNC: 0.68 MG/DL — SIGNIFICANT CHANGE UP (ref 0.5–1.3)
GLUCOSE BLDC GLUCOMTR-MCNC: 161 MG/DL — HIGH (ref 70–99)
GLUCOSE BLDC GLUCOMTR-MCNC: 192 MG/DL — HIGH (ref 70–99)
GLUCOSE BLDC GLUCOMTR-MCNC: 205 MG/DL — HIGH (ref 70–99)
GLUCOSE SERPL-MCNC: 130 MG/DL — HIGH (ref 70–99)
HCT VFR BLD CALC: 36.7 % — SIGNIFICANT CHANGE UP (ref 34.5–45)
HGB BLD-MCNC: 11.3 G/DL — LOW (ref 11.5–15.5)
MAGNESIUM SERPL-MCNC: 2 MG/DL — SIGNIFICANT CHANGE UP (ref 1.6–2.6)
MCHC RBC-ENTMCNC: 23.2 PG — LOW (ref 27–34)
MCHC RBC-ENTMCNC: 30.8 GM/DL — LOW (ref 32–36)
MCV RBC AUTO: 75.3 FL — LOW (ref 80–100)
PHOSPHATE SERPL-MCNC: 3.5 MG/DL — SIGNIFICANT CHANGE UP (ref 2.5–4.5)
PLATELET # BLD AUTO: 224 K/UL — SIGNIFICANT CHANGE UP (ref 150–400)
POTASSIUM SERPL-MCNC: 4.1 MMOL/L — SIGNIFICANT CHANGE UP (ref 3.5–5.3)
POTASSIUM SERPL-SCNC: 4.1 MMOL/L — SIGNIFICANT CHANGE UP (ref 3.5–5.3)
RBC # BLD: 4.87 M/UL — SIGNIFICANT CHANGE UP (ref 3.8–5.2)
RBC # FLD: 14.8 % — HIGH (ref 10.3–14.5)
SODIUM SERPL-SCNC: 143 MMOL/L — SIGNIFICANT CHANGE UP (ref 135–145)
WBC # BLD: 6.8 K/UL — SIGNIFICANT CHANGE UP (ref 3.8–10.5)
WBC # FLD AUTO: 6.8 K/UL — SIGNIFICANT CHANGE UP (ref 3.8–10.5)

## 2019-08-28 PROCEDURE — 99233 SBSQ HOSP IP/OBS HIGH 50: CPT

## 2019-08-28 PROCEDURE — 99233 SBSQ HOSP IP/OBS HIGH 50: CPT | Mod: GC

## 2019-08-28 RX ORDER — BENZOCAINE AND MENTHOL 5; 1 G/100ML; G/100ML
1 LIQUID ORAL
Refills: 0 | Status: DISCONTINUED | OUTPATIENT
Start: 2019-08-28 | End: 2019-09-03

## 2019-08-28 RX ORDER — POLYETHYLENE GLYCOL 3350 17 G/17G
17 POWDER, FOR SOLUTION ORAL
Refills: 0 | Status: DISCONTINUED | OUTPATIENT
Start: 2019-08-28 | End: 2019-09-03

## 2019-08-28 RX ORDER — FLUTICASONE PROPIONATE 50 MCG
1 SPRAY, SUSPENSION NASAL ONCE
Refills: 0 | Status: DISCONTINUED | OUTPATIENT
Start: 2019-08-28 | End: 2019-09-03

## 2019-08-28 RX ORDER — CHLORHEXIDINE GLUCONATE 213 G/1000ML
1 SOLUTION TOPICAL DAILY
Refills: 0 | Status: DISCONTINUED | OUTPATIENT
Start: 2019-08-28 | End: 2019-09-03

## 2019-08-28 RX ORDER — DOCUSATE SODIUM 100 MG
200 CAPSULE ORAL
Refills: 0 | Status: DISCONTINUED | OUTPATIENT
Start: 2019-08-28 | End: 2019-09-03

## 2019-08-28 RX ADMIN — POLYETHYLENE GLYCOL 3350 17 GRAM(S): 17 POWDER, FOR SOLUTION ORAL at 11:31

## 2019-08-28 RX ADMIN — Medication 3 MILLILITER(S): at 23:36

## 2019-08-28 RX ADMIN — Medication 3 MILLILITER(S): at 10:53

## 2019-08-28 RX ADMIN — Medication 200 MILLIGRAM(S): at 21:49

## 2019-08-28 RX ADMIN — TIOTROPIUM BROMIDE 1 CAPSULE(S): 18 CAPSULE ORAL; RESPIRATORY (INHALATION) at 12:19

## 2019-08-28 RX ADMIN — Medication 4: at 17:51

## 2019-08-28 RX ADMIN — APIXABAN 5 MILLIGRAM(S): 2.5 TABLET, FILM COATED ORAL at 17:05

## 2019-08-28 RX ADMIN — Medication 100.4 MILLIGRAM(S): at 17:04

## 2019-08-28 RX ADMIN — PANTOPRAZOLE SODIUM 40 MILLIGRAM(S): 20 TABLET, DELAYED RELEASE ORAL at 07:12

## 2019-08-28 RX ADMIN — Medication 3 MILLILITER(S): at 06:15

## 2019-08-28 RX ADMIN — MONTELUKAST 10 MILLIGRAM(S): 4 TABLET, CHEWABLE ORAL at 11:37

## 2019-08-28 RX ADMIN — APIXABAN 5 MILLIGRAM(S): 2.5 TABLET, FILM COATED ORAL at 07:12

## 2019-08-28 RX ADMIN — INSULIN GLARGINE 10 UNIT(S): 100 INJECTION, SOLUTION SUBCUTANEOUS at 09:46

## 2019-08-28 RX ADMIN — BACITRACIN ZINC NEOMYCIN SULFATE POLYMYXIN B SULFATE 1 APPLICATION(S): 400; 3.5; 5 OINTMENT TOPICAL at 11:38

## 2019-08-28 RX ADMIN — Medication 200 MILLIGRAM(S): at 11:31

## 2019-08-28 RX ADMIN — Medication 0.25 MILLIGRAM(S): at 07:11

## 2019-08-28 RX ADMIN — ATORVASTATIN CALCIUM 40 MILLIGRAM(S): 80 TABLET, FILM COATED ORAL at 21:48

## 2019-08-28 RX ADMIN — Medication 0.5 MILLIGRAM(S): at 11:38

## 2019-08-28 RX ADMIN — Medication 2: at 14:27

## 2019-08-28 RX ADMIN — Medication 3 MILLILITER(S): at 18:17

## 2019-08-28 RX ADMIN — Medication 34 MILLIGRAM(S): at 07:38

## 2019-08-28 RX ADMIN — BENZOCAINE AND MENTHOL 1 LOZENGE: 5; 1 LIQUID ORAL at 17:04

## 2019-08-28 NOTE — PROGRESS NOTE ADULT - ASSESSMENT
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  Has been undergoing extensive work up over the past month for progressive KRAMER including recent TTE, V/Q scan and CT chest. Notable findings for right sided peripheral lung nodules, unchanged from prior CTs. Echo with mod-severe AR and moderately dilated LA, diastolic dysfunction, normal RSVP.   Most recent PFTs with evidence of severe obstructive lung disease.   Last admitted 2/2019 acute hypoxic resp failure 2/2 coronovirus respiratory infection.   Has received antibiotics as an outpatient, most recently in July. Also most recent PET/CT 7/3/19 which shows mildly FDG-avid peripheral nodular opacity RML and anterior RLL nodule - unchanged size from prior.     Becomes dyspneic with minimal exertion.    Presented overnight on 8/26 with 1 day of productive cough, low grade fever of 100 F (while on chronic steroids) and chest tightness, feeling as if her "chest was caving in". In the ED received Solumedrol 40 IV and Duonebs x3. CXR does not show any acute pathology. 98% on RA and is speaking in full sentences.   Denies sick contacts.   RVP +entero/rhinovirus.   Anxious and upset as she is very concerned about her ongoing symptoms and the underlying etiology.      A/P: Extensive history as per above with chronic KRAMER, worsening and associated with productive cough and chest tightness over the past 24 hours.  1. RVP positive - likely viral bronchitis  c/w bronchodilators, Budesonide nebs, Spiriva  Remains on Solumedrol 40 mg IV - can transition to Prednisone to 40 mg as get closer to discharge and taper down to home dose slowly   Airway clearance therapy - acapella device 4x a day, incentive spirometer  Supplemental O2 as needed, goal sats 92-96%  If continues to experience minimal improvement with IV steroids, will discuss Azithromycin initiation for anti-inflammatory effects    2. Dyspnea -   Likely acute exacerbation of her   Cardiology service consulted and do not believe there is a cardiac component to her dyspnea given stable findings on TTE and no evidence of pHTN  -c/w home med regimen    3. Lung nodules - not likely the cause of her symptoms of dyspnea  unclear etiology, mildly PET-FDG avid and unchanged in size despite antibiotics since Feb 2019 after her acute resp infection. Consider biopsy - transthoracic approach may be best, will d/w Thoracic radiology but this work up should be done as an outpatient.     4. DVT ppx    Will follow up.       Attending Attestation:   I was physically present for the key portions of the evaluation and management (E/M) service provided.  I agree with the above history, physical, and plan which I have reviewed and edited where appropriate.     Plan discussed with patient

## 2019-08-28 NOTE — PROGRESS NOTE ADULT - SUBJECTIVE AND OBJECTIVE BOX
[facility] - Division of Pulmonary, Critical Care and Sleep Medicine   Please call [phone] between 8am-pm weekdays, [phone] after hours and weekends    Interval Events: No events overnight - continues to feel dyspneic with minimal exertion, coughing - unable to expectorate.     REVIEW OF SYSTEMS:  CV: [- ] chest pain   Resp: [+ ] cough [+ ] shortness of breath   [x ] All other systems negative  [ ] Unable to assess ROS because ________    OBJECTIVE:  ICU Vital Signs Last 24 Hrs  T(C): 36.9 (27 Aug 2019 20:50), Max: 36.9 (27 Aug 2019 20:50)  T(F): 98.5 (27 Aug 2019 20:50), Max: 98.5 (27 Aug 2019 20:50)  HR: 86 (28 Aug 2019 12:21) (79 - 89)  BP: 127/72 (27 Aug 2019 20:50) (111/68 - 127/72)  BP(mean): --  ABP: --  ABP(mean): --  RR: 18 (27 Aug 2019 20:50) (18 - 18)  SpO2: 95% (28 Aug 2019 12:21) (95% - 97%)        08-27 @ 07:01 - 08-28 @ 07:00  --------------------------------------------------------  IN: 260 mL / OUT: 1000 mL / NET: -740 mL    08-28 @ 07:01 - 08-28 @ 12:36  --------------------------------------------------------  IN: 0 mL / OUT: 550 mL / NET: -550 mL      CAPILLARY BLOOD GLUCOSE      POCT Blood Glucose.: 127 mg/dL (28 Aug 2019 09:41)      PHYSICAL EXAM:  General: NAD  HEENT: NC/AT  Neck: supple  Respiratory:  prolonged expiratory phase and scattered wheeze, no crackles or rhonchi  Cardiovascular:  RRR, no m/r/g  Abdomen: soft, NT/ND, +BS  Extremities: no clubbing, cyanosis or edema, warm  Skin: no rash  Neurological: AAOx3, non focal exam    HOSPITAL MEDICATIONS:  MEDICATIONS  (STANDING):  ALBUTerol/ipratropium for Nebulization 3 milliLiter(s) Nebulizer every 6 hours  apixaban 5 milliGRAM(s) Oral every 12 hours  atorvastatin 40 milliGRAM(s) Oral at bedtime  buDESOnide    Inhalation Suspension 0.5 milliGRAM(s) Inhalation daily  dextrose 5%. 1000 milliLiter(s) (50 mL/Hr) IV Continuous <Continuous>  dextrose 50% Injectable 12.5 Gram(s) IV Push once  dextrose 50% Injectable 25 Gram(s) IV Push once  dextrose 50% Injectable 25 Gram(s) IV Push once  digoxin     Tablet 0.25 milliGRAM(s) Oral daily  hydrocortisone 1% Cream 1 Application(s) Topical two times a day  insulin glargine Injectable (LANTUS) 10 Unit(s) SubCutaneous every morning  insulin lispro (HumaLOG) corrective regimen sliding scale   SubCutaneous three times a day before meals  insulin lispro (HumaLOG) corrective regimen sliding scale   SubCutaneous at bedtime  methylPREDNISolone sodium succinate Injectable 34 milliGRAM(s) IV Push daily  methylPREDNISolone sodium succinate IVPB 8 milliGRAM(s) IV Intermittent once  montelukast 10 milliGRAM(s) Oral daily  neomycin/BACItracin/polymyxin Topical Ointment 1 Application(s) Topical daily  pantoprazole    Tablet 40 milliGRAM(s) Oral before breakfast  tiotropium 18 MICROgram(s) Capsule 1 Capsule(s) Inhalation daily    MEDICATIONS  (PRN):  acetaminophen   Tablet .. 650 milliGRAM(s) Oral every 6 hours PRN Mild Pain (1 - 3), Moderate Pain (4 - 6)  benzocaine 15 mG/menthol 3.6 mG Lozenge 1 Lozenge Oral five times a day PRN Mouth Sores  dextrose 40% Gel 15 Gram(s) Oral once PRN Blood Glucose LESS THAN 70 milliGRAM(s)/deciliter  docusate sodium 200 milliGRAM(s) Oral two times a day PRN Constipation  fluticasone propionate 50 MICROgram(s)/spray Nasal Spray 1 Spray(s) Both Nostrils once PRN nasal congestion  glucagon  Injectable 1 milliGRAM(s) IntraMuscular once PRN Glucose LESS THAN 70 milligrams/deciliter  polyethylene glycol 3350 17 Gram(s) Oral two times a day PRN Constipation  sodium chloride 0.65% Nasal 1 Spray(s) Both Nostrils two times a day PRN Nasal Congestion      LABS:                        11.3   6.8   )-----------( 224      ( 28 Aug 2019 08:19 )             36.7     Hgb Trend: 11.3<--, 11.7<--, 12.6<--, 12.8<--  08-28    143  |  103  |  14  ----------------------------<  130<H>  4.1   |  25  |  0.68    Ca    9.3      28 Aug 2019 08:19  Phos  3.5     08-28  Mg     2.0     08-28    TPro  6.5  /  Alb  3.7  /  TBili  0.2  /  DBili  x   /  AST  9<L>  /  ALT  8<L>  /  AlkPhos  102  08-27    Creatinine Trend: 0.68<--, 0.66<--, 0.76<--      MICROBIOLOGY:  Culture - Sputum . (08.27.19 @ 13:53)    Gram Stain:   Few Squamous epithelial cells per low power field  Few polymorphonuclear leukocytes per low power field  Few Gram Positive Cocci in Pairs and Chains per oil power field  Rare Gram Variable Rods per oil power field    Specimen Source: .Sputum CUP      RADIOLOGY:  [x] Reviewed and interpreted by me  < from: Xray Chest 1 View AP/PA (08.26.19 @ 12:22) >  EXAM:  XR CHEST AP OR PA 1V                            PROCEDURE DATE:  08/26/2019            INTERPRETATION:  CLINICAL INFORMATION: Cough, pneumonia.    A single portable view of the chest was obtained.     Comparison: 8/13/2019.     The mediastinal cardiac silhouette is unremarkable. Right Mediport   unchanged.    The lungs are clear.     No acute osseous finding.     IMPRESSION:    Clear lungs.      NATHANIEL BLAKE M.D., ATTENDING RADIOLOGIST  This document has been electronically signed. Aug 26 2019 12:26PM    < end of copied text >

## 2019-08-28 NOTE — PROGRESS NOTE ADULT - PROBLEM SELECTOR PLAN 2
Patient with increased dyspnea despite txment for asthma, suspicious for cardiac component in the setting of AR  -pt EKG with p-wave inversions in V5, II, this abnormality is seen in previous EKG from 4/2019  -TTE 8/23: +Moderate-severe aortic regurgitation, Moderate left atrial enlargement.  -cardiac enzymes wnl  -Strict I's/O's and daily weights.  - no cardiac interventions recommended at this time   - cardiac following, appreciated recs Patient with increased dyspnea despite txment for asthma, suspicious for cardiac component in the setting of AR  -pt EKG with p-wave inversions in V5, II, this abnormality is seen in previous EKG from 4/2019  -TTE 8/23: +Moderate-severe aortic regurgitation, Moderate left atrial enlargement.  -cardiac enzymes wnl  -Strict I's/O's and daily weights.  - no cardiac interventions recommended at this time   - cardiology following, appreciated recs

## 2019-08-28 NOTE — PROGRESS NOTE ADULT - PROBLEM SELECTOR PLAN 4
-ostomy care orders  -functioning, clean, ctm  - functional Chemoport in place -ostomy care orders  -functioning, clean, ctm  -functional Chemoport in place

## 2019-08-28 NOTE — PROGRESS NOTE ADULT - PROBLEM SELECTOR PLAN 5
- c/w lantus 10U in the morning per patient, continue  -Consistent carbohydrate diet with Glucerna.  -C/w atorvastatin for HLD

## 2019-08-28 NOTE — PROGRESS NOTE ADULT - SUBJECTIVE AND OBJECTIVE BOX
Olamide John PGY1  Pager: [phone]    Chief Complaint: 67 y/o woman with asthma (on long term medrol) c/b adrenal insufficiency, afib on eliquis, DM2, colon cancer, tracheobronchomalacia s/p bronchial thermoplasty who presented to the ED with SOB and dyspnea       Subjective: Patient was awake and alert.  States she feels better than when she fist came in. She is eating well, and her ostomy bag is dry and clean. Patient denies any chest pain, headaches, dizziness, n/v, abd pain, dysuria, or hematuria.       VITAL SIGNS:  Vital Signs Last 24 Hrs  T(C): 36.9 (27 Aug 2019 20:50), Max: 36.9 (27 Aug 2019 20:50)  T(F): 98.5 (27 Aug 2019 20:50), Max: 98.5 (27 Aug 2019 20:50)  HR: 87 (28 Aug 2019 06:17) (74 - 89)  BP: 127/72 (27 Aug 2019 20:50) (111/68 - 127/72)  BP(mean): --  RR: 18 (27 Aug 2019 20:50) (18 - 22)  SpO2: 95% (28 Aug 2019 06:17) (95% - 98%)      PHYSICAL EXAM:   GENERAL: middle age women, wearing hospital gown, well groomed, sitting comfortably in bed in NAD    HEENT: right eye PERRL, MMM, +white exudates in the posterior pharynx. has a left eye prosthetic,   Pulm: normal work of breathing, +mild inspiratory wheezing, no rales or crackles were appreciated  CV: RRR, S1&S2+, no m/r/g appreciated, distant, +chemoport in right anterior chest wall (skin dry and intact)  ABDOMEN: soft, nt, nd, no hepatosplenomegaly.   MSK: ROM wnl in upper and lower extremities, no gross abnormalities noted  EXTREMITIES:  no edama, clubbing, or cyanosis  Neuro: A&Ox3, fluent speech, strength 5/5 in the upper and lower extremities, sensation intact in the upper and lower extremities.   SKIN: warm and dry. +vesicular, pustule rash, no well defined border in the R upper and lower quadrant regions. Unilateral, does not cross the midline in the front and does not spread to the back                           11.7   5.4   )-----------( 244      ( 27 Aug 2019 07:40 )             39.2     08-27    140  |  103  |  10  ----------------------------<  101<H>  3.8   |  27  |  0.66    Ca    9.3      27 Aug 2019 07:40  Phos  3.2     08-27  Mg     2.2     08-27    TPro  6.5  /  Alb  3.7  /  TBili  0.2  /  DBili  x   /  AST  9<L>  /  ALT  8<L>  /  AlkPhos  102  08-27      CAPILLARY BLOOD GLUCOSE      POCT Blood Glucose.: 179 mg/dL (27 Aug 2019 21:41)  POCT Blood Glucose.: 202 mg/dL (27 Aug 2019 18:05)  POCT Blood Glucose.: 194 mg/dL (27 Aug 2019 13:42)  POCT Blood Glucose.: 143 mg/dL (27 Aug 2019 10:11)      MEDICATIONS  (STANDING):  ALBUTerol/ipratropium for Nebulization 3 milliLiter(s) Nebulizer every 6 hours  apixaban 5 milliGRAM(s) Oral every 12 hours  atorvastatin 40 milliGRAM(s) Oral at bedtime  buDESOnide    Inhalation Suspension 0.5 milliGRAM(s) Inhalation daily  dextrose 5%. 1000 milliLiter(s) (50 mL/Hr) IV Continuous <Continuous>  dextrose 50% Injectable 12.5 Gram(s) IV Push once  dextrose 50% Injectable 25 Gram(s) IV Push once  dextrose 50% Injectable 25 Gram(s) IV Push once  digoxin     Tablet 0.25 milliGRAM(s) Oral daily  docusate sodium 100 milliGRAM(s) Oral daily  hydrocortisone 1% Cream 1 Application(s) Topical two times a day  insulin glargine Injectable (LANTUS) 10 Unit(s) SubCutaneous every morning  insulin lispro (HumaLOG) corrective regimen sliding scale   SubCutaneous three times a day before meals  insulin lispro (HumaLOG) corrective regimen sliding scale   SubCutaneous at bedtime  methylPREDNISolone sodium succinate Injectable 34 milliGRAM(s) IV Push daily  montelukast 10 milliGRAM(s) Oral daily  neomycin/BACItracin/polymyxin Topical Ointment 1 Application(s) Topical daily  pantoprazole    Tablet 40 milliGRAM(s) Oral before breakfast  polyethylene glycol 3350 17 Gram(s) Oral daily  tiotropium 18 MICROgram(s) Capsule 1 Capsule(s) Inhalation daily    MEDICATIONS  (PRN):  acetaminophen   Tablet .. 650 milliGRAM(s) Oral every 6 hours PRN Mild Pain (1 - 3), Moderate Pain (4 - 6)  dextrose 40% Gel 15 Gram(s) Oral once PRN Blood Glucose LESS THAN 70 milliGRAM(s)/deciliter  fluticasone propionate 50 MICROgram(s)/spray Nasal Spray 1 Spray(s) Both Nostrils once PRN nasal congestion  glucagon  Injectable 1 milliGRAM(s) IntraMuscular once PRN Glucose LESS THAN 70 milligrams/deciliter  sodium chloride 0.65% Nasal 1 Spray(s) Both Nostrils two times a day PRN Nasal Congestion Olamide John PGY1  Pager: [phone]    Chief Complaint: 69 y/o woman with asthma (on long term medrol) c/b adrenal insufficiency, afib on eliquis, DM2, colon cancer, tracheobronchomalacia s/p bronchial thermoplasty who presented to the ED with SOB and dyspnea       Subjective: Patient was awake and alert.  States she feels better than when she fist came in, but she is now coughing and has a sore throat. She is eating well, and her ostomy bag is dry and clean. Patient denies any chest pain, headaches, dizziness, n/v, abd pain, dysuria, or hematuria.       VITAL SIGNS:  Vital Signs Last 24 Hrs  T(C): 36.9 (27 Aug 2019 20:50), Max: 36.9 (27 Aug 2019 20:50)  T(F): 98.5 (27 Aug 2019 20:50), Max: 98.5 (27 Aug 2019 20:50)  HR: 87 (28 Aug 2019 06:17) (74 - 89)  BP: 127/72 (27 Aug 2019 20:50) (111/68 - 127/72)  BP(mean): --  RR: 18 (27 Aug 2019 20:50) (18 - 22)  SpO2: 95% (28 Aug 2019 06:17) (95% - 98%)      PHYSICAL EXAM:   GENERAL: middle age women, wearing hospital gown, well groomed, sitting comfortably in bed in NAD    HEENT: right eye PERRL, MMM, +erythema in posterior pharynx. has a left eye prosthetic,   Pulm: normal work of breathing, +mild inspiratory wheezing, no rales or crackles were appreciated  CV: RRR, S1&S2+, no m/r/g appreciated, distant, +chemoport in right anterior chest wall (skin dry and intact)  ABDOMEN: soft, nt, nd, no hepatosplenomegaly.   MSK: ROM wnl in upper and lower extremities, no gross abnormalities noted  EXTREMITIES:  no edama, clubbing, or cyanosis  Neuro: A&Ox3, fluent speech, strength 5/5 in the upper and lower extremities, sensation intact in the upper and lower extremities.   SKIN: warm and dry. +vesicular, pustule rash, no well defined border in the R upper and lower quadrant regions. Unilateral, does not cross the midline in the front and does not spread to the back                           11.7   5.4   )-----------( 244      ( 27 Aug 2019 07:40 )             39.2     08-27    140  |  103  |  10  ----------------------------<  101<H>  3.8   |  27  |  0.66    Ca    9.3      27 Aug 2019 07:40  Phos  3.2     08-27  Mg     2.2     08-27    TPro  6.5  /  Alb  3.7  /  TBili  0.2  /  DBili  x   /  AST  9<L>  /  ALT  8<L>  /  AlkPhos  102  08-27      CAPILLARY BLOOD GLUCOSE      POCT Blood Glucose.: 179 mg/dL (27 Aug 2019 21:41)  POCT Blood Glucose.: 202 mg/dL (27 Aug 2019 18:05)  POCT Blood Glucose.: 194 mg/dL (27 Aug 2019 13:42)  POCT Blood Glucose.: 143 mg/dL (27 Aug 2019 10:11)      MEDICATIONS  (STANDING):  ALBUTerol/ipratropium for Nebulization 3 milliLiter(s) Nebulizer every 6 hours  apixaban 5 milliGRAM(s) Oral every 12 hours  atorvastatin 40 milliGRAM(s) Oral at bedtime  buDESOnide    Inhalation Suspension 0.5 milliGRAM(s) Inhalation daily  dextrose 5%. 1000 milliLiter(s) (50 mL/Hr) IV Continuous <Continuous>  dextrose 50% Injectable 12.5 Gram(s) IV Push once  dextrose 50% Injectable 25 Gram(s) IV Push once  dextrose 50% Injectable 25 Gram(s) IV Push once  digoxin     Tablet 0.25 milliGRAM(s) Oral daily  docusate sodium 100 milliGRAM(s) Oral daily  hydrocortisone 1% Cream 1 Application(s) Topical two times a day  insulin glargine Injectable (LANTUS) 10 Unit(s) SubCutaneous every morning  insulin lispro (HumaLOG) corrective regimen sliding scale   SubCutaneous three times a day before meals  insulin lispro (HumaLOG) corrective regimen sliding scale   SubCutaneous at bedtime  methylPREDNISolone sodium succinate Injectable 34 milliGRAM(s) IV Push daily  montelukast 10 milliGRAM(s) Oral daily  neomycin/BACItracin/polymyxin Topical Ointment 1 Application(s) Topical daily  pantoprazole    Tablet 40 milliGRAM(s) Oral before breakfast  polyethylene glycol 3350 17 Gram(s) Oral daily  tiotropium 18 MICROgram(s) Capsule 1 Capsule(s) Inhalation daily    MEDICATIONS  (PRN):  acetaminophen   Tablet .. 650 milliGRAM(s) Oral every 6 hours PRN Mild Pain (1 - 3), Moderate Pain (4 - 6)  dextrose 40% Gel 15 Gram(s) Oral once PRN Blood Glucose LESS THAN 70 milliGRAM(s)/deciliter  fluticasone propionate 50 MICROgram(s)/spray Nasal Spray 1 Spray(s) Both Nostrils once PRN nasal congestion  glucagon  Injectable 1 milliGRAM(s) IntraMuscular once PRN Glucose LESS THAN 70 milligrams/deciliter  sodium chloride 0.65% Nasal 1 Spray(s) Both Nostrils two times a day PRN Nasal Congestion

## 2019-08-28 NOTE — PROGRESS NOTE ADULT - PROBLEM SELECTOR PLAN 7
-patient with itchy rash, vesicular on right trunk in a scattered distribution. Low suspicion for shingles.   - c/w neosporin

## 2019-08-28 NOTE — PROGRESS NOTE ADULT - PROBLEM SELECTOR PLAN 1
Pt with hx of severe persistent asthma, 10+ intubations (last one 5yrs ago), presents with SOB and dyspnea likely asthma exacerbation 2/2 to viral infection  - RVP + entero/rhino virus  - provide supportive care  -c/w singulair, budesonide, spiriva and duonebs Q6H  - saturating well on RA  -patient on performist and breo at home, we do not carry those medications  -follows with Dr. Allison for pulmonology  - c/w with IV steroids   - c/w chest PT/Acapella/incentive spirometer  - pulmonology following, appreciated recs Pt with hx of severe persistent asthma, 10+ intubations (last one 5yrs ago), presents with SOB and dyspnea likely asthma exacerbation 2/2 to viral infection  - RVP + entero/rhino virus  - provide supportive care  -c/w singulair, budesonide, spiriva and duonebs Q6H  - saturating well on RA  -follows with Dr. Allison for pulmonology  - c/w with IV steroids   - c/w chest PT/Acapella/incentive spirometer  - pulmonology following, appreciated recs

## 2019-08-28 NOTE — PROGRESS NOTE ADULT - ASSESSMENT
The patient is a 67 y/o woman with asthma (on long term medrol) c/b adrenal insufficiency, afib on eliquis, DM2, TIA, colon cancer (s/p resection, chemo, RT), tracheobronchomalacia s/p bronchial thermoplasty who presented to the ED with SOB and dyspnea on exertion most likely due to asthma exacerbation 2/2 to viral infection.

## 2019-08-28 NOTE — PROGRESS NOTE ADULT - PROBLEM SELECTOR PLAN 6
CT chest: +lung nodules in the RML  - Unlikely the cause of her symptoms of dyspnea  - unclear etiology, mildly PET-FDG avid and unchanged in size despite antibiotics since Feb 2019  - f/u work up as outpt with Dr. Allison

## 2019-08-29 LAB
ANION GAP SERPL CALC-SCNC: 9 MMOL/L — SIGNIFICANT CHANGE UP (ref 5–17)
BUN SERPL-MCNC: 15 MG/DL — SIGNIFICANT CHANGE UP (ref 7–23)
CALCIUM SERPL-MCNC: 9 MG/DL — SIGNIFICANT CHANGE UP (ref 8.4–10.5)
CHLORIDE SERPL-SCNC: 103 MMOL/L — SIGNIFICANT CHANGE UP (ref 96–108)
CO2 SERPL-SCNC: 27 MMOL/L — SIGNIFICANT CHANGE UP (ref 22–31)
CREAT SERPL-MCNC: 0.62 MG/DL — SIGNIFICANT CHANGE UP (ref 0.5–1.3)
CULTURE RESULTS: SIGNIFICANT CHANGE UP
GLUCOSE BLDC GLUCOMTR-MCNC: 142 MG/DL — HIGH (ref 70–99)
GLUCOSE BLDC GLUCOMTR-MCNC: 200 MG/DL — HIGH (ref 70–99)
GLUCOSE BLDC GLUCOMTR-MCNC: 202 MG/DL — HIGH (ref 70–99)
GLUCOSE BLDC GLUCOMTR-MCNC: 237 MG/DL — HIGH (ref 70–99)
GLUCOSE SERPL-MCNC: 135 MG/DL — HIGH (ref 70–99)
HCT VFR BLD CALC: 36.7 % — SIGNIFICANT CHANGE UP (ref 34.5–45)
HGB BLD-MCNC: 11.3 G/DL — LOW (ref 11.5–15.5)
MAGNESIUM SERPL-MCNC: 2 MG/DL — SIGNIFICANT CHANGE UP (ref 1.6–2.6)
MCHC RBC-ENTMCNC: 23.3 PG — LOW (ref 27–34)
MCHC RBC-ENTMCNC: 30.9 GM/DL — LOW (ref 32–36)
MCV RBC AUTO: 75.6 FL — LOW (ref 80–100)
PHOSPHATE SERPL-MCNC: 3 MG/DL — SIGNIFICANT CHANGE UP (ref 2.5–4.5)
PLATELET # BLD AUTO: 230 K/UL — SIGNIFICANT CHANGE UP (ref 150–400)
POTASSIUM SERPL-MCNC: 4.1 MMOL/L — SIGNIFICANT CHANGE UP (ref 3.5–5.3)
POTASSIUM SERPL-SCNC: 4.1 MMOL/L — SIGNIFICANT CHANGE UP (ref 3.5–5.3)
PROCALCITONIN SERPL-MCNC: 0.05 NG/ML — SIGNIFICANT CHANGE UP (ref 0.02–0.1)
RBC # BLD: 4.85 M/UL — SIGNIFICANT CHANGE UP (ref 3.8–5.2)
RBC # FLD: 14.8 % — HIGH (ref 10.3–14.5)
SODIUM SERPL-SCNC: 139 MMOL/L — SIGNIFICANT CHANGE UP (ref 135–145)
SPECIMEN SOURCE: SIGNIFICANT CHANGE UP
WBC # BLD: 8.8 K/UL — SIGNIFICANT CHANGE UP (ref 3.8–10.5)
WBC # FLD AUTO: 8.8 K/UL — SIGNIFICANT CHANGE UP (ref 3.8–10.5)

## 2019-08-29 PROCEDURE — 99233 SBSQ HOSP IP/OBS HIGH 50: CPT | Mod: GC

## 2019-08-29 PROCEDURE — 99233 SBSQ HOSP IP/OBS HIGH 50: CPT

## 2019-08-29 RX ORDER — LACTULOSE 10 G/15ML
10 SOLUTION ORAL ONCE
Refills: 0 | Status: COMPLETED | OUTPATIENT
Start: 2019-08-29 | End: 2019-08-29

## 2019-08-29 RX ORDER — AZITHROMYCIN 500 MG/1
250 TABLET, FILM COATED ORAL ONCE
Refills: 0 | Status: COMPLETED | OUTPATIENT
Start: 2019-08-29 | End: 2019-08-29

## 2019-08-29 RX ADMIN — Medication 2: at 13:16

## 2019-08-29 RX ADMIN — AZITHROMYCIN 250 MILLIGRAM(S): 500 TABLET, FILM COATED ORAL at 17:05

## 2019-08-29 RX ADMIN — Medication 3 MILLILITER(S): at 18:06

## 2019-08-29 RX ADMIN — APIXABAN 5 MILLIGRAM(S): 2.5 TABLET, FILM COATED ORAL at 17:05

## 2019-08-29 RX ADMIN — PANTOPRAZOLE SODIUM 40 MILLIGRAM(S): 20 TABLET, DELAYED RELEASE ORAL at 05:39

## 2019-08-29 RX ADMIN — TIOTROPIUM BROMIDE 1 CAPSULE(S): 18 CAPSULE ORAL; RESPIRATORY (INHALATION) at 12:10

## 2019-08-29 RX ADMIN — CHLORHEXIDINE GLUCONATE 1 APPLICATION(S): 213 SOLUTION TOPICAL at 12:54

## 2019-08-29 RX ADMIN — BENZOCAINE AND MENTHOL 1 LOZENGE: 5; 1 LIQUID ORAL at 12:50

## 2019-08-29 RX ADMIN — Medication 3 MILLILITER(S): at 11:52

## 2019-08-29 RX ADMIN — Medication 3 MILLILITER(S): at 05:07

## 2019-08-29 RX ADMIN — Medication 34 MILLIGRAM(S): at 05:37

## 2019-08-29 RX ADMIN — Medication 200 MILLIGRAM(S): at 09:19

## 2019-08-29 RX ADMIN — MONTELUKAST 10 MILLIGRAM(S): 4 TABLET, CHEWABLE ORAL at 12:58

## 2019-08-29 RX ADMIN — BACITRACIN ZINC NEOMYCIN SULFATE POLYMYXIN B SULFATE 1 APPLICATION(S): 400; 3.5; 5 OINTMENT TOPICAL at 12:51

## 2019-08-29 RX ADMIN — POLYETHYLENE GLYCOL 3350 17 GRAM(S): 17 POWDER, FOR SOLUTION ORAL at 08:58

## 2019-08-29 RX ADMIN — APIXABAN 5 MILLIGRAM(S): 2.5 TABLET, FILM COATED ORAL at 05:38

## 2019-08-29 RX ADMIN — LACTULOSE 10 GRAM(S): 10 SOLUTION ORAL at 14:29

## 2019-08-29 RX ADMIN — ATORVASTATIN CALCIUM 40 MILLIGRAM(S): 80 TABLET, FILM COATED ORAL at 21:08

## 2019-08-29 RX ADMIN — Medication 0.25 MILLIGRAM(S): at 05:36

## 2019-08-29 RX ADMIN — Medication 4: at 17:48

## 2019-08-29 RX ADMIN — Medication 0.5 MILLIGRAM(S): at 12:07

## 2019-08-29 RX ADMIN — INSULIN GLARGINE 10 UNIT(S): 100 INJECTION, SOLUTION SUBCUTANEOUS at 08:58

## 2019-08-29 RX ADMIN — Medication 8 MILLIGRAM(S): at 20:02

## 2019-08-29 NOTE — CHART NOTE - NSCHARTNOTEFT_GEN_A_CORE
RAYRAY [id number]      Patient is a 68y old  Female who presents with a chief complaint of SOB (29 Aug 2019 09:52)  REVIEW OF SYSTEMS    Per Pulmonology recs, starting azithromycin 250mg 3 times a week for COPD ppx. RAYRAY [id number]      Patient is a 68y old  Female who presents with a chief complaint of SOB (29 Aug 2019 09:52)  REVIEW OF SYSTEMS    Per Pulmonology recs, starting azithromycin 250mg 3 times a week for COPD exacerbation ppx.

## 2019-08-29 NOTE — PROGRESS NOTE ADULT - SUBJECTIVE AND OBJECTIVE BOX
[facility] - Division of Pulmonary, Critical Care and Sleep Medicine   Please call [phone] between 8am-pm weekdays, [phone] after hours and weekends    Interval Events:     REVIEW OF SYSTEMS:  CV: [ ] chest pain   Resp: [ ] cough [ ] shortness of breath   [ ] All other systems negative  [ ] Unable to assess ROS because ________    OBJECTIVE:  ICU Vital Signs Last 24 Hrs  T(C): 36.7 (29 Aug 2019 05:18), Max: 36.8 (28 Aug 2019 20:33)  T(F): 98.1 (29 Aug 2019 05:18), Max: 98.3 (28 Aug 2019 20:33)  HR: 62 (29 Aug 2019 05:18) (58 - 87)  BP: 145/59 (29 Aug 2019 05:18) (145/59 - 156/65)  BP(mean): --  ABP: --  ABP(mean): --  RR: 18 (29 Aug 2019 05:18) (18 - 18)  SpO2: 99% (29 Aug 2019 05:18) (95% - 100%)        08-28 @ 07:01 - 08-29 @ 07:00  --------------------------------------------------------  IN: 240 mL / OUT: 3150 mL / NET: -2910 mL    08-29 @ 07:01 - 08-29 @ 09:53  --------------------------------------------------------  IN: 0 mL / OUT: 300 mL / NET: -300 mL      CAPILLARY BLOOD GLUCOSE      POCT Blood Glucose.: 142 mg/dL (29 Aug 2019 08:50)      PHYSICAL EXAM:  General: NAD  HEENT: NC/AT  Lymph Nodes: no cervical or supraclavicular lymphadenopathy  Neck: supple  Respiratory:  CTA b/l, no wheezes, crackles or rhonchi  Cardiovascular:  RRR, no m/r/g  Abdomen: soft, NT/ND, +BS  Extremities: no clubbing, cyanosis or edema, warm  Skin: no rash  Neurological: AAOx3, non focal exam  Psychiatry: not anxious appearing, normal affect and mood    HOSPITAL MEDICATIONS:  MEDICATIONS  (STANDING):  ALBUTerol/ipratropium for Nebulization 3 milliLiter(s) Nebulizer every 6 hours  apixaban 5 milliGRAM(s) Oral every 12 hours  atorvastatin 40 milliGRAM(s) Oral at bedtime  buDESOnide    Inhalation Suspension 0.5 milliGRAM(s) Inhalation daily  chlorhexidine 2% Cloths 1 Application(s) Topical daily  dextrose 5%. 1000 milliLiter(s) (50 mL/Hr) IV Continuous <Continuous>  dextrose 50% Injectable 12.5 Gram(s) IV Push once  dextrose 50% Injectable 25 Gram(s) IV Push once  dextrose 50% Injectable 25 Gram(s) IV Push once  digoxin     Tablet 0.25 milliGRAM(s) Oral daily  hydrocortisone 1% Cream 1 Application(s) Topical two times a day  insulin glargine Injectable (LANTUS) 10 Unit(s) SubCutaneous every morning  insulin lispro (HumaLOG) corrective regimen sliding scale   SubCutaneous three times a day before meals  insulin lispro (HumaLOG) corrective regimen sliding scale   SubCutaneous at bedtime  montelukast 10 milliGRAM(s) Oral daily  neomycin/BACItracin/polymyxin Topical Ointment 1 Application(s) Topical daily  pantoprazole    Tablet 40 milliGRAM(s) Oral before breakfast  tiotropium 18 MICROgram(s) Capsule 1 Capsule(s) Inhalation daily    MEDICATIONS  (PRN):  acetaminophen   Tablet .. 650 milliGRAM(s) Oral every 6 hours PRN Mild Pain (1 - 3), Moderate Pain (4 - 6)  benzocaine 15 mG/menthol 3.6 mG Lozenge 1 Lozenge Oral five times a day PRN Mouth Sores  dextrose 40% Gel 15 Gram(s) Oral once PRN Blood Glucose LESS THAN 70 milliGRAM(s)/deciliter  docusate sodium 200 milliGRAM(s) Oral two times a day PRN Constipation  fluticasone propionate 50 MICROgram(s)/spray Nasal Spray 1 Spray(s) Both Nostrils once PRN nasal congestion  glucagon  Injectable 1 milliGRAM(s) IntraMuscular once PRN Glucose LESS THAN 70 milligrams/deciliter  polyethylene glycol 3350 17 Gram(s) Oral two times a day PRN Constipation  sodium chloride 0.65% Nasal 1 Spray(s) Both Nostrils two times a day PRN Nasal Congestion      LABS:                        11.3   8.8   )-----------( 230      ( 29 Aug 2019 07:09 )             36.7     Hgb Trend: 11.3<--, 11.3<--, 11.7<--, 12.6<--, 12.8<--  08-29    139  |  103  |  15  ----------------------------<  135<H>  4.1   |  27  |  0.62    Ca    9.0      29 Aug 2019 07:07  Phos  3.0     08-29  Mg     2.0     08-29      Creatinine Trend: 0.62<--, 0.68<--, 0.66<--, 0.76<--            MICROBIOLOGY:     RADIOLOGY:  [ ] Reviewed and interpreted by me [facility] - Division of Pulmonary, Critical Care and Sleep Medicine   Please call [phone] between 8am-pm weekdays, [phone] after hours and weekends    Interval Events: No events overnight, continues to feel dyspnea on minimal exertion and coughing    REVIEW OF SYSTEMS:  CV: [ -] chest pain   Resp: [ +] cough [+ ] shortness of breath   [x ] All other systems negative  [ ] Unable to assess ROS because ________    OBJECTIVE:  ICU Vital Signs Last 24 Hrs  T(C): 36.7 (29 Aug 2019 05:18), Max: 36.8 (28 Aug 2019 20:33)  T(F): 98.1 (29 Aug 2019 05:18), Max: 98.3 (28 Aug 2019 20:33)  HR: 62 (29 Aug 2019 05:18) (58 - 87)  BP: 145/59 (29 Aug 2019 05:18) (145/59 - 156/65)  BP(mean): --  ABP: --  ABP(mean): --  RR: 18 (29 Aug 2019 05:18) (18 - 18)  SpO2: 99% (29 Aug 2019 05:18) (95% - 100%)        08-28 @ 07:01 - 08-29 @ 07:00  --------------------------------------------------------  IN: 240 mL / OUT: 3150 mL / NET: -2910 mL    08-29 @ 07:01 - 08-29 @ 09:53  --------------------------------------------------------  IN: 0 mL / OUT: 300 mL / NET: -300 mL      CAPILLARY BLOOD GLUCOSE      POCT Blood Glucose.: 142 mg/dL (29 Aug 2019 08:50)      PHYSICAL EXAM:  General: NAD  HEENT: NC/AT  Neck: supple  Respiratory:  prolonged exp phase, no wheezes, crackles or rhonchi  Cardiovascular:  RRR, no m/r/g  Abdomen: soft, NT/ND, +BS  Extremities: no clubbing, cyanosis or edema, warm  Skin: no rash  Neurological: AAOx3, non focal exam  Psychiatry: not anxious appearing, normal affect and mood    HOSPITAL MEDICATIONS:  MEDICATIONS  (STANDING):  ALBUTerol/ipratropium for Nebulization 3 milliLiter(s) Nebulizer every 6 hours  apixaban 5 milliGRAM(s) Oral every 12 hours  atorvastatin 40 milliGRAM(s) Oral at bedtime  buDESOnide    Inhalation Suspension 0.5 milliGRAM(s) Inhalation daily  chlorhexidine 2% Cloths 1 Application(s) Topical daily  dextrose 5%. 1000 milliLiter(s) (50 mL/Hr) IV Continuous <Continuous>  dextrose 50% Injectable 12.5 Gram(s) IV Push once  dextrose 50% Injectable 25 Gram(s) IV Push once  dextrose 50% Injectable 25 Gram(s) IV Push once  digoxin     Tablet 0.25 milliGRAM(s) Oral daily  hydrocortisone 1% Cream 1 Application(s) Topical two times a day  insulin glargine Injectable (LANTUS) 10 Unit(s) SubCutaneous every morning  insulin lispro (HumaLOG) corrective regimen sliding scale   SubCutaneous three times a day before meals  insulin lispro (HumaLOG) corrective regimen sliding scale   SubCutaneous at bedtime  montelukast 10 milliGRAM(s) Oral daily  neomycin/BACItracin/polymyxin Topical Ointment 1 Application(s) Topical daily  pantoprazole    Tablet 40 milliGRAM(s) Oral before breakfast  tiotropium 18 MICROgram(s) Capsule 1 Capsule(s) Inhalation daily    MEDICATIONS  (PRN):  acetaminophen   Tablet .. 650 milliGRAM(s) Oral every 6 hours PRN Mild Pain (1 - 3), Moderate Pain (4 - 6)  benzocaine 15 mG/menthol 3.6 mG Lozenge 1 Lozenge Oral five times a day PRN Mouth Sores  dextrose 40% Gel 15 Gram(s) Oral once PRN Blood Glucose LESS THAN 70 milliGRAM(s)/deciliter  docusate sodium 200 milliGRAM(s) Oral two times a day PRN Constipation  fluticasone propionate 50 MICROgram(s)/spray Nasal Spray 1 Spray(s) Both Nostrils once PRN nasal congestion  glucagon  Injectable 1 milliGRAM(s) IntraMuscular once PRN Glucose LESS THAN 70 milligrams/deciliter  polyethylene glycol 3350 17 Gram(s) Oral two times a day PRN Constipation  sodium chloride 0.65% Nasal 1 Spray(s) Both Nostrils two times a day PRN Nasal Congestion      LABS:                        11.3   8.8   )-----------( 230      ( 29 Aug 2019 07:09 )             36.7     Hgb Trend: 11.3<--, 11.3<--, 11.7<--, 12.6<--, 12.8<--  08-29    139  |  103  |  15  ----------------------------<  135<H>  4.1   |  27  |  0.62    Ca    9.0      29 Aug 2019 07:07  Phos  3.0     08-29  Mg     2.0     08-29      Creatinine Trend: 0.62<--, 0.68<--, 0.66<--, 0.76<--            MICROBIOLOGY:     RADIOLOGY:  [ ] Reviewed and interpreted by me

## 2019-08-29 NOTE — PROGRESS NOTE ADULT - SUBJECTIVE AND OBJECTIVE BOX
Olamide John PGY1  Pager: [phone]      Chief Complaint: 69 y/o woman with asthma (on long term medrol) c/b adrenal insufficiency, afib on eliquis, DM2, colon cancer, tracheobronchomalacia s/p bronchial thermoplasty who presented to the ED with SOB       Subjective: Patient was awake and alert.  States she feels better than when she fist came in, but she is now coughing and has a sore throat. She is eating well, and her ostomy bag is dry and clean. Patient denies any chest pain, headaches, dizziness, n/v, abd pain, dysuria, or hematuria.      VITAL SIGNS:  Vital Signs Last 24 Hrs  T(C): 36.7 (29 Aug 2019 05:18), Max: 36.8 (28 Aug 2019 20:33)  T(F): 98.1 (29 Aug 2019 05:18), Max: 98.3 (28 Aug 2019 20:33)  HR: 62 (29 Aug 2019 05:18) (58 - 87)  BP: 145/59 (29 Aug 2019 05:18) (145/59 - 156/65)  BP(mean): --  RR: 18 (29 Aug 2019 05:18) (18 - 18)  SpO2: 99% (29 Aug 2019 05:18) (95% - 100%)      PHYSICAL EXAM:   GENERAL: middle age women, wearing hospital gown, well groomed, sitting comfortably in bed in NAD    HEENT: right eye PERRL, MMM, +erythema in posterior pharynx. has a left eye prosthetic,   Pulm: normal work of breathing, +mild inspiratory wheezing, no rales or crackles were appreciated  CV: RRR, S1&S2+, no m/r/g appreciated, distant, +chemoport in right anterior chest wall (skin dry and intact)  ABDOMEN: soft, nt, nd, no hepatosplenomegaly.   MSK: ROM wnl in upper and lower extremities, no gross abnormalities noted  EXTREMITIES:  no edama, clubbing, or cyanosis  Neuro: A&Ox3, fluent speech, strength 5/5 in the upper and lower extremities, sensation intact in the upper and lower extremities.   SKIN: warm and dry. +vesicular, pustule rash, no well defined border in the R upper and lower quadrant regions. Unilateral, does not cross the midline in the front and does not spread to the back                         11.3   6.8   )-----------( 224      ( 28 Aug 2019 08:19 )             36.7     08-29    139  |  103  |  15  ----------------------------<  135<H>  4.1   |  27  |  0.62    Ca    9.0      29 Aug 2019 07:07  Phos  3.0     08-29  Mg     2.0     08-29        CAPILLARY BLOOD GLUCOSE      POCT Blood Glucose.: 161 mg/dL (28 Aug 2019 21:35)  POCT Blood Glucose.: 205 mg/dL (28 Aug 2019 17:35)  POCT Blood Glucose.: 192 mg/dL (28 Aug 2019 14:16)  POCT Blood Glucose.: 127 mg/dL (28 Aug 2019 09:41)      MEDICATIONS  (STANDING):  ALBUTerol/ipratropium for Nebulization 3 milliLiter(s) Nebulizer every 6 hours  apixaban 5 milliGRAM(s) Oral every 12 hours  atorvastatin 40 milliGRAM(s) Oral at bedtime  buDESOnide    Inhalation Suspension 0.5 milliGRAM(s) Inhalation daily  chlorhexidine 2% Cloths 1 Application(s) Topical daily  dextrose 5%. 1000 milliLiter(s) (50 mL/Hr) IV Continuous <Continuous>  dextrose 50% Injectable 12.5 Gram(s) IV Push once  dextrose 50% Injectable 25 Gram(s) IV Push once  dextrose 50% Injectable 25 Gram(s) IV Push once  digoxin     Tablet 0.25 milliGRAM(s) Oral daily  hydrocortisone 1% Cream 1 Application(s) Topical two times a day  insulin glargine Injectable (LANTUS) 10 Unit(s) SubCutaneous every morning  insulin lispro (HumaLOG) corrective regimen sliding scale   SubCutaneous three times a day before meals  insulin lispro (HumaLOG) corrective regimen sliding scale   SubCutaneous at bedtime  montelukast 10 milliGRAM(s) Oral daily  neomycin/BACItracin/polymyxin Topical Ointment 1 Application(s) Topical daily  pantoprazole    Tablet 40 milliGRAM(s) Oral before breakfast  tiotropium 18 MICROgram(s) Capsule 1 Capsule(s) Inhalation daily    MEDICATIONS  (PRN):  acetaminophen   Tablet .. 650 milliGRAM(s) Oral every 6 hours PRN Mild Pain (1 - 3), Moderate Pain (4 - 6)  benzocaine 15 mG/menthol 3.6 mG Lozenge 1 Lozenge Oral five times a day PRN Mouth Sores  dextrose 40% Gel 15 Gram(s) Oral once PRN Blood Glucose LESS THAN 70 milliGRAM(s)/deciliter  docusate sodium 200 milliGRAM(s) Oral two times a day PRN Constipation  fluticasone propionate 50 MICROgram(s)/spray Nasal Spray 1 Spray(s) Both Nostrils once PRN nasal congestion  glucagon  Injectable 1 milliGRAM(s) IntraMuscular once PRN Glucose LESS THAN 70 milligrams/deciliter  polyethylene glycol 3350 17 Gram(s) Oral two times a day PRN Constipation  sodium chloride 0.65% Nasal 1 Spray(s) Both Nostrils two times a day PRN Nasal Congestion Olamide John PGY1  Pager: [phone]      Chief Complaint: 69 y/o woman with asthma (on long term medrol) c/b adrenal insufficiency, afib on eliquis, DM2, colon cancer, tracheobronchomalacia s/p bronchial thermoplasty who presented to the ED with SOB       Subjective: Patient was awake and alert.  States she feels better than when she fist came in, coughing is also improving, but she now reports she feels some SOB when she speaks for long periods of time.  She is eating well, and her ostomy bag is dry and clean. Cont with constipation despite miralax and colace. Patient denies any chest pain, headaches, dizziness, n/v, abd pain, dysuria, or hematuria.      VITAL SIGNS:  Vital Signs Last 24 Hrs  T(C): 36.7 (29 Aug 2019 05:18), Max: 36.8 (28 Aug 2019 20:33)  T(F): 98.1 (29 Aug 2019 05:18), Max: 98.3 (28 Aug 2019 20:33)  HR: 62 (29 Aug 2019 05:18) (58 - 87)  BP: 145/59 (29 Aug 2019 05:18) (145/59 - 156/65)  BP(mean): --  RR: 18 (29 Aug 2019 05:18) (18 - 18)  SpO2: 99% (29 Aug 2019 05:18) (95% - 100%)      PHYSICAL EXAM:   GENERAL: middle age women, wearing hospital gown, well groomed, sitting comfortably in bed in NAD    HEENT: right eye PERRL, MMM, +erythema in posterior pharynx. has a left eye prosthetic,   Pulm: normal work of breathing, +mild inspiratory wheezing, no rales or crackles were appreciated  CV: RRR, S1&S2+, no m/r/g appreciated, distant, +chemoport in right anterior chest wall (skin dry and intact)  ABDOMEN: soft, nt, nd, no hepatosplenomegaly.   MSK: ROM wnl in upper and lower extremities, no gross abnormalities noted  EXTREMITIES:  no edama, clubbing, or cyanosis  Neuro: A&Ox3, fluent speech, strength 5/5 in the upper and lower extremities, sensation intact in the upper and lower extremities.   SKIN: warm and dry. +vesicular, pustule rash, no well defined border in the R upper and lower quadrant regions. Unilateral, does not cross the midline in the front and does not spread to the back                         11.3   6.8   )-----------( 224      ( 28 Aug 2019 08:19 )             36.7     08-29    139  |  103  |  15  ----------------------------<  135<H>  4.1   |  27  |  0.62    Ca    9.0      29 Aug 2019 07:07  Phos  3.0     08-29  Mg     2.0     08-29        CAPILLARY BLOOD GLUCOSE      POCT Blood Glucose.: 161 mg/dL (28 Aug 2019 21:35)  POCT Blood Glucose.: 205 mg/dL (28 Aug 2019 17:35)  POCT Blood Glucose.: 192 mg/dL (28 Aug 2019 14:16)  POCT Blood Glucose.: 127 mg/dL (28 Aug 2019 09:41)      MEDICATIONS  (STANDING):  ALBUTerol/ipratropium for Nebulization 3 milliLiter(s) Nebulizer every 6 hours  apixaban 5 milliGRAM(s) Oral every 12 hours  atorvastatin 40 milliGRAM(s) Oral at bedtime  buDESOnide    Inhalation Suspension 0.5 milliGRAM(s) Inhalation daily  chlorhexidine 2% Cloths 1 Application(s) Topical daily  dextrose 5%. 1000 milliLiter(s) (50 mL/Hr) IV Continuous <Continuous>  dextrose 50% Injectable 12.5 Gram(s) IV Push once  dextrose 50% Injectable 25 Gram(s) IV Push once  dextrose 50% Injectable 25 Gram(s) IV Push once  digoxin     Tablet 0.25 milliGRAM(s) Oral daily  hydrocortisone 1% Cream 1 Application(s) Topical two times a day  insulin glargine Injectable (LANTUS) 10 Unit(s) SubCutaneous every morning  insulin lispro (HumaLOG) corrective regimen sliding scale   SubCutaneous three times a day before meals  insulin lispro (HumaLOG) corrective regimen sliding scale   SubCutaneous at bedtime  montelukast 10 milliGRAM(s) Oral daily  neomycin/BACItracin/polymyxin Topical Ointment 1 Application(s) Topical daily  pantoprazole    Tablet 40 milliGRAM(s) Oral before breakfast  tiotropium 18 MICROgram(s) Capsule 1 Capsule(s) Inhalation daily    MEDICATIONS  (PRN):  acetaminophen   Tablet .. 650 milliGRAM(s) Oral every 6 hours PRN Mild Pain (1 - 3), Moderate Pain (4 - 6)  benzocaine 15 mG/menthol 3.6 mG Lozenge 1 Lozenge Oral five times a day PRN Mouth Sores  dextrose 40% Gel 15 Gram(s) Oral once PRN Blood Glucose LESS THAN 70 milliGRAM(s)/deciliter  docusate sodium 200 milliGRAM(s) Oral two times a day PRN Constipation  fluticasone propionate 50 MICROgram(s)/spray Nasal Spray 1 Spray(s) Both Nostrils once PRN nasal congestion  glucagon  Injectable 1 milliGRAM(s) IntraMuscular once PRN Glucose LESS THAN 70 milligrams/deciliter  polyethylene glycol 3350 17 Gram(s) Oral two times a day PRN Constipation  sodium chloride 0.65% Nasal 1 Spray(s) Both Nostrils two times a day PRN Nasal Congestion

## 2019-08-29 NOTE — PROGRESS NOTE ADULT - PROBLEM SELECTOR PLAN 2
Patient with increased dyspnea despite txment for asthma, suspicious for cardiac component in the setting of AR  -pt EKG with p-wave inversions in V5, II, this abnormality is seen in previous EKG from 4/2019  -TTE 8/23: +Moderate-severe aortic regurgitation, Moderate left atrial enlargement.  -cardiac enzymes wnl  -Strict I's/O's and daily weights.  - no cardiac interventions recommended at this time   - cardiology following, appreciated recs

## 2019-08-29 NOTE — PROGRESS NOTE ADULT - PROBLEM SELECTOR PLAN 4
-ostomy care orders  -functioning, clean, ctm  -functional Chemoport in place -ostomy care orders  -functioning, clean, ctm  -functional Chemoport in place  - constipation despite colace and miralax  - give lactulose 10mg x1

## 2019-08-29 NOTE — PROGRESS NOTE ADULT - PROBLEM SELECTOR PLAN 1
Pt with hx of severe persistent asthma, 10+ intubations (last one 5yrs ago), presents with SOB and dyspnea likely asthma exacerbation 2/2 to viral infection  - RVP + entero/rhino virus  - procalcitonin wnl   - provide supportive care  -c/w singulair, budesonide, spiriva and duonebs Q6H  - saturating well on RA  -follows with Dr. Allison for pulmonology  - c/w with IV steroids change to PO as tolerated  - c/w chest PT/Acapella/incentive spirometer  - if cont with minimal improvement, consider adding azithromycin for anti-inflammatory effect  - pulmonology following, appreciated recs Pt with hx of severe persistent asthma, 10+ intubations (last one 5yrs ago), presents with SOB and dyspnea likely asthma exacerbation 2/2 to viral infection  - RVP + entero/rhino virus  - procalcitonin wnl   - provide supportive care  -c/w singulair, budesonide, spiriva and duonebs Q6H  - saturating well on RA  -follows with Dr. Allison for pulmonology  - c/w with IV steroids change to PO as tolerated  - c/w chest PT/Acapella/incentive spirometer  - f/u with CXR  - if cont with minimal improvement, consider adding azithromycin for anti-inflammatory effect  - pulmonology following, appreciated recs

## 2019-08-29 NOTE — PROGRESS NOTE ADULT - ASSESSMENT
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  Has been undergoing extensive work up over the past month for progressive KRAMER including recent TTE, V/Q scan and CT chest. Notable findings for right sided peripheral lung nodules, unchanged from prior CTs. Echo with mod-severe AR and moderately dilated LA, diastolic dysfunction, normal RSVP.   Most recent PFTs with evidence of severe obstructive lung disease.   Last admitted 2/2019 acute hypoxic resp failure 2/2 coronovirus respiratory infection.   Has received antibiotics as an outpatient, most recently in July. Also most recent PET/CT 7/3/19 which shows mildly FDG-avid peripheral nodular opacity RML and anterior RLL nodule - unchanged size from prior.     Becomes dyspneic with minimal exertion.    Presented overnight on 8/26 with 1 day of productive cough, low grade fever of 100 F (while on chronic steroids) and chest tightness, feeling as if her "chest was caving in". In the ED received Solumedrol 40 IV and Duonebs x3. CXR does not show any acute pathology. 98% on RA and is speaking in full sentences.   Denies sick contacts.   RVP +entero/rhinovirus.   Anxious and upset as she is very concerned about her ongoing symptoms and the underlying etiology.      A/P: Extensive history as per above with chronic KRAMER, worsening and associated with productive cough and chest tightness over the past 24 hours.  1. RVP positive - likely viral bronchitis  c/w bronchodilators, Budesonide nebs, Spiriva  Remains on Solumedrol 40 mg IV - can transition to Prednisone to 40 mg as get closer to discharge and taper down to home dose slowly   Airway clearance therapy - acapella device 4x a day, incentive spirometer  Supplemental O2 as needed, goal sats 92-96%  If continues to experience minimal improvement with IV steroids, will discuss Azithromycin initiation for anti-inflammatory effects    2. Dyspnea -   Likely acute exacerbation of her   Cardiology service consulted and do not believe there is a cardiac component to her dyspnea given stable findings on TTE and no evidence of pHTN  -c/w home med regimen    3. Lung nodules - not likely the cause of her symptoms of dyspnea  unclear etiology, mildly PET-FDG avid and unchanged in size despite antibiotics since Feb 2019 after her acute resp infection. Consider biopsy - transthoracic approach may be best, will d/w Thoracic radiology but this work up should be done as an outpatient.     4. DVT ppx    Will follow up.       Attending Attestation:   I was physically present for the key portions of the evaluation and management (E/M) service provided.  I agree with the above history, physical, and plan which I have reviewed and edited where appropriate.     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  Has been undergoing extensive work up over the past month for progressive KRAMER including recent TTE, V/Q scan and CT chest. Notable findings for right sided peripheral lung nodules, unchanged from prior CTs. Echo with mod-severe AR and moderately dilated LA, diastolic dysfunction, normal RSVP.   Most recent PFTs with evidence of severe obstructive lung disease.   Last admitted 2/2019 acute hypoxic resp failure 2/2 coronovirus respiratory infection.   Has received antibiotics as an outpatient, most recently in July. Also most recent PET/CT 7/3/19 which shows mildly FDG-avid peripheral nodular opacity RML and anterior RLL nodule - unchanged size from prior.     Becomes dyspneic with minimal exertion.    Presented overnight on 8/26 with 1 day of productive cough, low grade fever of 100 F (while on chronic steroids) and chest tightness, feeling as if her "chest was caving in". In the ED received Solumedrol 40 IV and Duonebs x3. CXR does not show any acute pathology. 98% on RA and is speaking in full sentences.   Denies sick contacts.   RVP +entero/rhinovirus.   Anxious and upset as she is very concerned about her ongoing symptoms and the underlying etiology.      A/P: Extensive history as per above with chronic KRAMER, worsening and associated with productive cough and chest tightness over the past 24 hours.  1. RVP positive - likely viral bronchitis  c/w bronchodilators, Budesonide nebs, Spiriva  Remains on Solumedrol 40 mg IV - can transition to Prednisone to 40 mg as get closer to discharge and taper down to home dose slowly   Airway clearance therapy - acapella device 4x a day, incentive spirometer  Supplemental O2 as needed, goal sats 92-96%  As she continues to experience minimal improvement with IV steroids, would start Azithromycin 250 mg 3x a week for anti-inflammatory effects    2. Dyspnea -   Likely acute exacerbation of her obstructive lung disease  Cardiology service consulted and do not believe there is a cardiac component to her dyspnea given stable findings on TTE and no evidence of pHTN  -c/w home med regimen    3. Lung nodules - not likely the cause of her symptoms of dyspnea  unclear etiology, mildly PET-FDG avid and unchanged in size despite antibiotics since Feb 2019 after her acute resp infection. Consider biopsy - transthoracic approach may be best, will d/w Thoracic radiology but this work up should be done as an outpatient.     4. DVT ppx    Will follow up.       Attending Attestation:   I was physically present for the key portions of the evaluation and management (E/M) service provided.  I agree with the above history, physical, and plan which I have reviewed and edited where appropriate.     Plan discussed with patient and housestaff

## 2019-08-30 LAB
ANION GAP SERPL CALC-SCNC: 11 MMOL/L — SIGNIFICANT CHANGE UP (ref 5–17)
BUN SERPL-MCNC: 18 MG/DL — SIGNIFICANT CHANGE UP (ref 7–23)
CALCIUM SERPL-MCNC: 9.1 MG/DL — SIGNIFICANT CHANGE UP (ref 8.4–10.5)
CHLORIDE SERPL-SCNC: 101 MMOL/L — SIGNIFICANT CHANGE UP (ref 96–108)
CO2 SERPL-SCNC: 30 MMOL/L — SIGNIFICANT CHANGE UP (ref 22–31)
CREAT SERPL-MCNC: 0.7 MG/DL — SIGNIFICANT CHANGE UP (ref 0.5–1.3)
GLUCOSE BLDC GLUCOMTR-MCNC: 194 MG/DL — HIGH (ref 70–99)
GLUCOSE BLDC GLUCOMTR-MCNC: 220 MG/DL — HIGH (ref 70–99)
GLUCOSE BLDC GLUCOMTR-MCNC: 223 MG/DL — HIGH (ref 70–99)
GLUCOSE BLDC GLUCOMTR-MCNC: 252 MG/DL — HIGH (ref 70–99)
GLUCOSE SERPL-MCNC: 201 MG/DL — HIGH (ref 70–99)
HCT VFR BLD CALC: 37.5 % — SIGNIFICANT CHANGE UP (ref 34.5–45)
HGB BLD-MCNC: 11.3 G/DL — LOW (ref 11.5–15.5)
MAGNESIUM SERPL-MCNC: 2 MG/DL — SIGNIFICANT CHANGE UP (ref 1.6–2.6)
MCHC RBC-ENTMCNC: 22.9 PG — LOW (ref 27–34)
MCHC RBC-ENTMCNC: 30.1 GM/DL — LOW (ref 32–36)
MCV RBC AUTO: 76.1 FL — LOW (ref 80–100)
PHOSPHATE SERPL-MCNC: 3.3 MG/DL — SIGNIFICANT CHANGE UP (ref 2.5–4.5)
PLATELET # BLD AUTO: 245 K/UL — SIGNIFICANT CHANGE UP (ref 150–400)
POTASSIUM SERPL-MCNC: 4.2 MMOL/L — SIGNIFICANT CHANGE UP (ref 3.5–5.3)
POTASSIUM SERPL-SCNC: 4.2 MMOL/L — SIGNIFICANT CHANGE UP (ref 3.5–5.3)
RBC # BLD: 4.93 M/UL — SIGNIFICANT CHANGE UP (ref 3.8–5.2)
RBC # FLD: 15 % — HIGH (ref 10.3–14.5)
SODIUM SERPL-SCNC: 142 MMOL/L — SIGNIFICANT CHANGE UP (ref 135–145)
WBC # BLD: 9.2 K/UL — SIGNIFICANT CHANGE UP (ref 3.8–10.5)
WBC # FLD AUTO: 9.2 K/UL — SIGNIFICANT CHANGE UP (ref 3.8–10.5)

## 2019-08-30 PROCEDURE — 99233 SBSQ HOSP IP/OBS HIGH 50: CPT | Mod: GC

## 2019-08-30 PROCEDURE — 71046 X-RAY EXAM CHEST 2 VIEWS: CPT | Mod: 26

## 2019-08-30 RX ADMIN — CHLORHEXIDINE GLUCONATE 1 APPLICATION(S): 213 SOLUTION TOPICAL at 12:29

## 2019-08-30 RX ADMIN — BENZOCAINE AND MENTHOL 1 LOZENGE: 5; 1 LIQUID ORAL at 17:14

## 2019-08-30 RX ADMIN — BENZOCAINE AND MENTHOL 1 LOZENGE: 5; 1 LIQUID ORAL at 05:36

## 2019-08-30 RX ADMIN — Medication 3 MILLILITER(S): at 18:41

## 2019-08-30 RX ADMIN — TIOTROPIUM BROMIDE 1 CAPSULE(S): 18 CAPSULE ORAL; RESPIRATORY (INHALATION) at 11:30

## 2019-08-30 RX ADMIN — Medication 20 MILLIGRAM(S): at 05:35

## 2019-08-30 RX ADMIN — INSULIN GLARGINE 10 UNIT(S): 100 INJECTION, SOLUTION SUBCUTANEOUS at 10:28

## 2019-08-30 RX ADMIN — Medication 4: at 13:18

## 2019-08-30 RX ADMIN — Medication 0.25 MILLIGRAM(S): at 05:34

## 2019-08-30 RX ADMIN — ATORVASTATIN CALCIUM 40 MILLIGRAM(S): 80 TABLET, FILM COATED ORAL at 21:44

## 2019-08-30 RX ADMIN — Medication 3 MILLILITER(S): at 00:08

## 2019-08-30 RX ADMIN — BACITRACIN ZINC NEOMYCIN SULFATE POLYMYXIN B SULFATE 1 APPLICATION(S): 400; 3.5; 5 OINTMENT TOPICAL at 12:30

## 2019-08-30 RX ADMIN — Medication 200 MILLIGRAM(S): at 17:14

## 2019-08-30 RX ADMIN — Medication 3 MILLILITER(S): at 05:28

## 2019-08-30 RX ADMIN — Medication 4: at 18:10

## 2019-08-30 RX ADMIN — Medication 20 MILLIGRAM(S): at 17:15

## 2019-08-30 RX ADMIN — APIXABAN 5 MILLIGRAM(S): 2.5 TABLET, FILM COATED ORAL at 05:34

## 2019-08-30 RX ADMIN — MONTELUKAST 10 MILLIGRAM(S): 4 TABLET, CHEWABLE ORAL at 12:29

## 2019-08-30 RX ADMIN — PANTOPRAZOLE SODIUM 40 MILLIGRAM(S): 20 TABLET, DELAYED RELEASE ORAL at 05:34

## 2019-08-30 RX ADMIN — Medication 2: at 22:09

## 2019-08-30 RX ADMIN — BENZOCAINE AND MENTHOL 1 LOZENGE: 5; 1 LIQUID ORAL at 12:29

## 2019-08-30 RX ADMIN — POLYETHYLENE GLYCOL 3350 17 GRAM(S): 17 POWDER, FOR SOLUTION ORAL at 17:14

## 2019-08-30 RX ADMIN — APIXABAN 5 MILLIGRAM(S): 2.5 TABLET, FILM COATED ORAL at 17:15

## 2019-08-30 RX ADMIN — Medication 3 MILLILITER(S): at 11:28

## 2019-08-30 RX ADMIN — Medication 2: at 10:28

## 2019-08-30 RX ADMIN — Medication 0.5 MILLIGRAM(S): at 11:28

## 2019-08-30 NOTE — PROGRESS NOTE ADULT - SUBJECTIVE AND OBJECTIVE BOX
[facility] - Division of Pulmonary, Critical Care and Sleep Medicine   Please call [phone] between 8am-pm weekdays, [phone] after hours and weekends    Interval Events: No events overnight.     REVIEW OF SYSTEMS:  CV: [ ] chest pain   Resp: [ ] cough [ ] shortness of breath   [ ] All other systems negative  [ ] Unable to assess ROS because ________    OBJECTIVE:  ICU Vital Signs Last 24 Hrs  T(C): 36.4 (30 Aug 2019 05:30), Max: 36.7 (29 Aug 2019 21:02)  T(F): 97.6 (30 Aug 2019 05:30), Max: 98 (29 Aug 2019 21:02)  HR: 64 (30 Aug 2019 11:56) (61 - 68)  BP: 124/66 (30 Aug 2019 05:30) (124/66 - 130/73)  BP(mean): --  ABP: --  ABP(mean): --  RR: 18 (30 Aug 2019 05:30) (18 - 18)  SpO2: 98% (30 Aug 2019 11:56) (96% - 99%)        08-29 @ 07:01  -  08-30 @ 07:00  --------------------------------------------------------  IN: 850 mL / OUT: 1300 mL / NET: -450 mL      CAPILLARY BLOOD GLUCOSE      POCT Blood Glucose.: 223 mg/dL (30 Aug 2019 13:12)      PHYSICAL EXAM:  General: NAD  HEENT: NC/AT  Lymph Nodes: no cervical or supraclavicular lymphadenopathy  Neck: supple  Respiratory:  CTA b/l, no wheezes, crackles or rhonchi  Cardiovascular:  RRR, no m/r/g  Abdomen: soft, NT/ND, +BS  Extremities: no clubbing, cyanosis or edema, warm  Skin: no rash  Neurological: AAOx3, non focal exam  Psychiatry: not anxious appearing, normal affect and mood    HOSPITAL MEDICATIONS:  MEDICATIONS  (STANDING):  ALBUTerol/ipratropium for Nebulization 3 milliLiter(s) Nebulizer every 6 hours  apixaban 5 milliGRAM(s) Oral every 12 hours  atorvastatin 40 milliGRAM(s) Oral at bedtime  buDESOnide    Inhalation Suspension 0.5 milliGRAM(s) Inhalation daily  chlorhexidine 2% Cloths 1 Application(s) Topical daily  dextrose 5%. 1000 milliLiter(s) (50 mL/Hr) IV Continuous <Continuous>  dextrose 50% Injectable 12.5 Gram(s) IV Push once  dextrose 50% Injectable 25 Gram(s) IV Push once  dextrose 50% Injectable 25 Gram(s) IV Push once  digoxin     Tablet 0.25 milliGRAM(s) Oral daily  hydrocortisone 1% Cream 1 Application(s) Topical two times a day  insulin glargine Injectable (LANTUS) 10 Unit(s) SubCutaneous every morning  insulin lispro (HumaLOG) corrective regimen sliding scale   SubCutaneous three times a day before meals  insulin lispro (HumaLOG) corrective regimen sliding scale   SubCutaneous at bedtime  methylPREDNISolone sodium succinate Injectable 20 milliGRAM(s) IV Push every 12 hours  montelukast 10 milliGRAM(s) Oral daily  neomycin/BACItracin/polymyxin Topical Ointment 1 Application(s) Topical daily  pantoprazole    Tablet 40 milliGRAM(s) Oral before breakfast  tiotropium 18 MICROgram(s) Capsule 1 Capsule(s) Inhalation daily    MEDICATIONS  (PRN):  acetaminophen   Tablet .. 650 milliGRAM(s) Oral every 6 hours PRN Mild Pain (1 - 3), Moderate Pain (4 - 6)  benzocaine 15 mG/menthol 3.6 mG Lozenge 1 Lozenge Oral five times a day PRN Mouth Sores  dextrose 40% Gel 15 Gram(s) Oral once PRN Blood Glucose LESS THAN 70 milliGRAM(s)/deciliter  docusate sodium 200 milliGRAM(s) Oral two times a day PRN Constipation  fluticasone propionate 50 MICROgram(s)/spray Nasal Spray 1 Spray(s) Both Nostrils once PRN nasal congestion  glucagon  Injectable 1 milliGRAM(s) IntraMuscular once PRN Glucose LESS THAN 70 milligrams/deciliter  polyethylene glycol 3350 17 Gram(s) Oral two times a day PRN Constipation  sodium chloride 0.65% Nasal 1 Spray(s) Both Nostrils two times a day PRN Nasal Congestion      LABS:                        11.3   9.2   )-----------( 245      ( 30 Aug 2019 06:59 )             37.5     Hgb Trend: 11.3<--, 11.3<--, 11.3<--, 11.7<--, 12.6<--  08-30    142  |  101  |  18  ----------------------------<  201<H>  4.2   |  30  |  0.70    Ca    9.1      30 Aug 2019 06:59  Phos  3.3     08-30  Mg     2.0     08-30      Creatinine Trend: 0.70<--, 0.62<--, 0.68<--, 0.66<--, 0.76<--            MICROBIOLOGY:     RADIOLOGY:  [ ] Reviewed and interpreted by me [facility] - Division of Pulmonary, Critical Care and Sleep Medicine   Please call [phone] between 8am-pm weekdays, [phone] after hours and weekends    Interval Events: No events overnight. Feels a little less KRAMER, however coughing with yellow-green sputum production.    REVIEW OF SYSTEMS:  CV: [- ] chest pain   Resp: [+ ] cough [+ ] shortness of breath   [x ] All other systems negative  [ ] Unable to assess ROS because ________    OBJECTIVE:  ICU Vital Signs Last 24 Hrs  T(C): 36.4 (30 Aug 2019 05:30), Max: 36.7 (29 Aug 2019 21:02)  T(F): 97.6 (30 Aug 2019 05:30), Max: 98 (29 Aug 2019 21:02)  HR: 64 (30 Aug 2019 11:56) (61 - 68)  BP: 124/66 (30 Aug 2019 05:30) (124/66 - 130/73)  BP(mean): --  ABP: --  ABP(mean): --  RR: 18 (30 Aug 2019 05:30) (18 - 18)  SpO2: 98% (30 Aug 2019 11:56) (96% - 99%)        08-29 @ 07:01  -  08-30 @ 07:00  --------------------------------------------------------  IN: 850 mL / OUT: 1300 mL / NET: -450 mL      CAPILLARY BLOOD GLUCOSE      POCT Blood Glucose.: 223 mg/dL (30 Aug 2019 13:12)      PHYSICAL EXAM:  General: NAD  HEENT: NC/AT  Neck: supple  Respiratory:  CTA b/l, no wheezes, crackles or rhonchi. Prolonged exp phase  Cardiovascular:  RRR, no m/r/g  Abdomen: soft, NT/ND, +BS  Extremities: no clubbing, cyanosis or edema, warm  Skin: no rash  Neurological: AAOx3, non focal exam  Psychiatry: not anxious appearing, normal affect and mood    HOSPITAL MEDICATIONS:  MEDICATIONS  (STANDING):  ALBUTerol/ipratropium for Nebulization 3 milliLiter(s) Nebulizer every 6 hours  apixaban 5 milliGRAM(s) Oral every 12 hours  atorvastatin 40 milliGRAM(s) Oral at bedtime  buDESOnide    Inhalation Suspension 0.5 milliGRAM(s) Inhalation daily  chlorhexidine 2% Cloths 1 Application(s) Topical daily  dextrose 5%. 1000 milliLiter(s) (50 mL/Hr) IV Continuous <Continuous>  dextrose 50% Injectable 12.5 Gram(s) IV Push once  dextrose 50% Injectable 25 Gram(s) IV Push once  dextrose 50% Injectable 25 Gram(s) IV Push once  digoxin     Tablet 0.25 milliGRAM(s) Oral daily  hydrocortisone 1% Cream 1 Application(s) Topical two times a day  insulin glargine Injectable (LANTUS) 10 Unit(s) SubCutaneous every morning  insulin lispro (HumaLOG) corrective regimen sliding scale   SubCutaneous three times a day before meals  insulin lispro (HumaLOG) corrective regimen sliding scale   SubCutaneous at bedtime  methylPREDNISolone sodium succinate Injectable 20 milliGRAM(s) IV Push every 12 hours  montelukast 10 milliGRAM(s) Oral daily  neomycin/BACItracin/polymyxin Topical Ointment 1 Application(s) Topical daily  pantoprazole    Tablet 40 milliGRAM(s) Oral before breakfast  tiotropium 18 MICROgram(s) Capsule 1 Capsule(s) Inhalation daily    MEDICATIONS  (PRN):  acetaminophen   Tablet .. 650 milliGRAM(s) Oral every 6 hours PRN Mild Pain (1 - 3), Moderate Pain (4 - 6)  benzocaine 15 mG/menthol 3.6 mG Lozenge 1 Lozenge Oral five times a day PRN Mouth Sores  dextrose 40% Gel 15 Gram(s) Oral once PRN Blood Glucose LESS THAN 70 milliGRAM(s)/deciliter  docusate sodium 200 milliGRAM(s) Oral two times a day PRN Constipation  fluticasone propionate 50 MICROgram(s)/spray Nasal Spray 1 Spray(s) Both Nostrils once PRN nasal congestion  glucagon  Injectable 1 milliGRAM(s) IntraMuscular once PRN Glucose LESS THAN 70 milligrams/deciliter  polyethylene glycol 3350 17 Gram(s) Oral two times a day PRN Constipation  sodium chloride 0.65% Nasal 1 Spray(s) Both Nostrils two times a day PRN Nasal Congestion      LABS:                        11.3   9.2   )-----------( 245      ( 30 Aug 2019 06:59 )             37.5     Hgb Trend: 11.3<--, 11.3<--, 11.3<--, 11.7<--, 12.6<--  08-30    142  |  101  |  18  ----------------------------<  201<H>  4.2   |  30  |  0.70    Ca    9.1      30 Aug 2019 06:59  Phos  3.3     08-30  Mg     2.0     08-30      Creatinine Trend: 0.70<--, 0.62<--, 0.68<--, 0.66<--, 0.76<--            MICROBIOLOGY:     RADIOLOGY:  [ ] Reviewed and interpreted by me

## 2019-08-30 NOTE — PROGRESS NOTE ADULT - ASSESSMENT
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  Has been undergoing extensive work up over the past month for progressive KRAMER including recent TTE, V/Q scan and CT chest. Notable findings for right sided peripheral lung nodules, unchanged from prior CTs. Echo with mod-severe AR and moderately dilated LA, diastolic dysfunction, normal RSVP.   Most recent PFTs with evidence of severe obstructive lung disease.   Last admitted 2/2019 acute hypoxic resp failure 2/2 coronovirus respiratory infection.   Has received antibiotics as an outpatient, most recently in July. Also most recent PET/CT 7/3/19 which shows mildly FDG-avid peripheral nodular opacity RML and anterior RLL nodule - unchanged size from prior.     Becomes dyspneic with minimal exertion.    Presented overnight on 8/26 with 1 day of productive cough, low grade fever of 100 F (while on chronic steroids) and chest tightness, feeling as if her "chest was caving in". In the ED received Solumedrol 40 IV and Duonebs x3. CXR does not show any acute pathology. 98% on RA and is speaking in full sentences.   Denies sick contacts.   RVP +entero/rhinovirus.   Anxious and upset as she is very concerned about her ongoing symptoms and the underlying etiology.      A/P: Extensive history as per above with chronic KRAMER, worsening and associated with productive cough and chest tightness over the past 24 hours.  1. RVP positive - likely viral bronchitis  c/w bronchodilators, Budesonide nebs BID, Spiriva  Solumedrol 20 mg IV BID- can transition to Prednisone to 40 mg as get closer to discharge and taper down to home dose slowly   Airway clearance therapy - acapella device 4x a day, incentive spirometer  Supplemental O2 as needed, goal sats 92-96%  As she continues to experience minimal improvement with IV steroids - c/w  Azithromycin 250 mg 3x a week for anti-inflammatory effects    2. Dyspnea -   Likely acute exacerbation of her obstructive lung disease  Cardiology service consulted and do not believe there is a cardiac component to her dyspnea given stable findings on TTE and no evidence of pHTN  -c/w home med regimen    3. Lung nodules - not likely the cause of her symptoms of dyspnea  unclear etiology, mildly PET-FDG avid and unchanged in size despite antibiotics since Feb 2019 after her acute resp infection. Consider biopsy - transthoracic approach may be best, will d/w Thoracic radiology but this work up should be done as an outpatient.     4. DVT ppx    Will follow up.       Attending Attestation:   I was physically present for the key portions of the evaluation and management (E/M) service provided.  I agree with the above history, physical, and plan which I have reviewed and edited where appropriate. 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  Has been undergoing extensive work up over the past month for progressive KRAMER including recent TTE, V/Q scan and CT chest. Notable findings for right sided peripheral lung nodules, unchanged from prior CTs. Echo with mod-severe AR and moderately dilated LA, diastolic dysfunction, normal RSVP.   Most recent PFTs with evidence of severe obstructive lung disease.   Last admitted 2/2019 acute hypoxic resp failure 2/2 coronovirus respiratory infection.   Has received antibiotics as an outpatient, most recently in July. Also most recent PET/CT 7/3/19 which shows mildly FDG-avid peripheral nodular opacity RML and anterior RLL nodule - unchanged size from prior.     Becomes dyspneic with minimal exertion.    Presented overnight on 8/26 with 1 day of productive cough, low grade fever of 100 F (while on chronic steroids) and chest tightness, feeling as if her "chest was caving in". In the ED received Solumedrol 40 IV and Duonebs x3. CXR does not show any acute pathology. 98% on RA and is speaking in full sentences.   Denies sick contacts.   RVP +entero/rhinovirus.   Anxious and upset as she is very concerned about her ongoing symptoms and the underlying etiology.      A/P: Extensive history as per above with chronic KRAMER, worsening and associated with productive cough and chest tightness over the past 24 hours.  1. RVP positive - likely viral bronchitis  c/w bronchodilators, Budesonide nebs BID, Spiriva  Solumedrol 20 mg IV BID- can transition to Prednisone to 40 mg as get closer to discharge and taper down to home dose slowly   Airway clearance therapy - acapella device 4x a day, incentive spirometer  Supplemental O2 as needed, goal sats 92-96%  As she continues to experience minimal improvement with IV steroids - c/w  Azithromycin 250 mg 3x a week for anti-inflammatory effects  Now with likely superimposed bacterial bronchitis - please start Levaquin 500 mg QD for 5 days. Send sputum cultures    2. Dyspnea -   Likely acute exacerbation of her obstructive lung disease  Cardiology service consulted and do not believe there is a cardiac component to her dyspnea given stable findings on TTE and no evidence of pHTN  -c/w home med regimen    3. Lung nodules - not likely the cause of her symptoms of dyspnea  unclear etiology, mildly PET-FDG avid and unchanged in size despite antibiotics since Feb 2019 after her acute resp infection. Consider biopsy - transthoracic approach may be best, will d/w Thoracic radiology but this work up should be done as an outpatient.     4. DVT ppx    Will follow up.       Attending Attestation:   I was physically present for the key portions of the evaluation and management (E/M) service provided.  I agree with the above history, physical, and plan which I have reviewed and edited where appropriate.

## 2019-08-30 NOTE — PROGRESS NOTE ADULT - PROBLEM SELECTOR PLAN 4
-ostomy care orders  -functioning, clean, ctm  -functional Chemoport in place  - constipation despite colace, miralax, lactulose  - ostomy nurse to perform an enema today

## 2019-08-30 NOTE — PROGRESS NOTE ADULT - SUBJECTIVE AND OBJECTIVE BOX
Olamide John PGY1  Pager: [phone]      Chief Complaint: 67 y/o woman with asthma (on long term medrol) c/b adrenal insufficiency, afib on eliquis, DM2, colon cancer, tracheobronchomalacia s/p bronchial thermoplasty who presented to the ED with SOB       Subjective: Patient was awake and alert. She is eating well, and her ostomy bag is dry and clean. Cont with constipation despite lactulose, osteomy nurse to see her today for enama. Patient denies any chest pain, headaches, dizziness, n/v, abd pain, dysuria, or hematuria.      VITAL SIGNS:  Vital Signs Last 24 Hrs  T(C): 36.4 (30 Aug 2019 05:30), Max: 36.7 (29 Aug 2019 21:02)  T(F): 97.6 (30 Aug 2019 05:30), Max: 98 (29 Aug 2019 21:02)  HR: 62 (30 Aug 2019 05:30) (61 - 73)  BP: 124/66 (30 Aug 2019 05:30) (119/62 - 130/73)  BP(mean): --  RR: 18 (30 Aug 2019 05:30) (18 - 18)  SpO2: 97% (30 Aug 2019 05:30) (95% - 99%)      PHYSICAL EXAM:   GENERAL: middle age women, wearing hospital gown, well groomed, sitting comfortably in bed in NAD    HEENT: right eye PERRL, MMM, +erythema in posterior pharynx. has a left eye prosthetic,   Pulm: normal work of breathing, +mild inspiratory wheezing, no rales or crackles were appreciated  CV: RRR, S1&S2+, no m/r/g appreciated, distant, +chemoport in right anterior chest wall (skin dry and intact)  ABDOMEN: soft, nt, nd, no hepatosplenomegaly.   MSK: ROM wnl in upper and lower extremities, no gross abnormalities noted  EXTREMITIES:  no edama, clubbing, or cyanosis  Neuro: A&Ox3, fluent speech, strength 5/5 in the upper and lower extremities, sensation intact in the upper and lower extremities.   SKIN: warm and dry. +vesicular, pustule rash, no well defined border in the R upper and lower quadrant regions. Unilateral, does not cross the midline in the front and does not spread to the back                           11.3   8.8   )-----------( 230      ( 29 Aug 2019 07:09 )             36.7     08-30    142  |  101  |  18  ----------------------------<  201<H>  4.2   |  30  |  0.70    Ca    9.1      30 Aug 2019 06:59  Phos  3.3     08-30  Mg     2.0     08-30        CAPILLARY BLOOD GLUCOSE      POCT Blood Glucose.: 237 mg/dL (29 Aug 2019 21:42)  POCT Blood Glucose.: 202 mg/dL (29 Aug 2019 17:42)  POCT Blood Glucose.: 200 mg/dL (29 Aug 2019 13:10)  POCT Blood Glucose.: 142 mg/dL (29 Aug 2019 08:50)      MEDICATIONS  (STANDING):  ALBUTerol/ipratropium for Nebulization 3 milliLiter(s) Nebulizer every 6 hours  apixaban 5 milliGRAM(s) Oral every 12 hours  atorvastatin 40 milliGRAM(s) Oral at bedtime  buDESOnide    Inhalation Suspension 0.5 milliGRAM(s) Inhalation daily  chlorhexidine 2% Cloths 1 Application(s) Topical daily  dextrose 5%. 1000 milliLiter(s) (50 mL/Hr) IV Continuous <Continuous>  dextrose 50% Injectable 12.5 Gram(s) IV Push once  dextrose 50% Injectable 25 Gram(s) IV Push once  dextrose 50% Injectable 25 Gram(s) IV Push once  digoxin     Tablet 0.25 milliGRAM(s) Oral daily  hydrocortisone 1% Cream 1 Application(s) Topical two times a day  insulin glargine Injectable (LANTUS) 10 Unit(s) SubCutaneous every morning  insulin lispro (HumaLOG) corrective regimen sliding scale   SubCutaneous three times a day before meals  insulin lispro (HumaLOG) corrective regimen sliding scale   SubCutaneous at bedtime  methylPREDNISolone sodium succinate Injectable 20 milliGRAM(s) IV Push every 12 hours  montelukast 10 milliGRAM(s) Oral daily  neomycin/BACItracin/polymyxin Topical Ointment 1 Application(s) Topical daily  pantoprazole    Tablet 40 milliGRAM(s) Oral before breakfast  tiotropium 18 MICROgram(s) Capsule 1 Capsule(s) Inhalation daily    MEDICATIONS  (PRN):  acetaminophen   Tablet .. 650 milliGRAM(s) Oral every 6 hours PRN Mild Pain (1 - 3), Moderate Pain (4 - 6)  benzocaine 15 mG/menthol 3.6 mG Lozenge 1 Lozenge Oral five times a day PRN Mouth Sores  dextrose 40% Gel 15 Gram(s) Oral once PRN Blood Glucose LESS THAN 70 milliGRAM(s)/deciliter  docusate sodium 200 milliGRAM(s) Oral two times a day PRN Constipation  fluticasone propionate 50 MICROgram(s)/spray Nasal Spray 1 Spray(s) Both Nostrils once PRN nasal congestion  glucagon  Injectable 1 milliGRAM(s) IntraMuscular once PRN Glucose LESS THAN 70 milligrams/deciliter  polyethylene glycol 3350 17 Gram(s) Oral two times a day PRN Constipation  sodium chloride 0.65% Nasal 1 Spray(s) Both Nostrils two times a day PRN Nasal Congestion Olamide John PGY1  Pager: [phone]      Chief Complaint: 67 y/o woman with asthma (on long term medrol) c/b adrenal insufficiency, afib on eliquis, DM2, colon cancer, tracheobronchomalacia s/p bronchial thermoplasty who presented to the ED with SOB       Subjective: Patient was awake and alert. States her sputum is changing in color. No longer has SOB when speaking for long periods of time. She is eating well, and her ostomy bag is dry and clean. Continues with constipation despite lactulose, ostomy nurse to see her today for enema. Patient denies any chest pain, headaches, dizziness, n/v, abd pain, dysuria, or hematuria.      VITAL SIGNS:  Vital Signs Last 24 Hrs  T(C): 36.4 (30 Aug 2019 05:30), Max: 36.7 (29 Aug 2019 21:02)  T(F): 97.6 (30 Aug 2019 05:30), Max: 98 (29 Aug 2019 21:02)  HR: 62 (30 Aug 2019 05:30) (61 - 73)  BP: 124/66 (30 Aug 2019 05:30) (119/62 - 130/73)  BP(mean): --  RR: 18 (30 Aug 2019 05:30) (18 - 18)  SpO2: 97% (30 Aug 2019 05:30) (95% - 99%)      PHYSICAL EXAM:   GENERAL: middle age women, wearing hospital gown, well groomed, sitting comfortably in bed in NAD    HEENT: right eye PERRL, MMM, +erythema in posterior pharynx. has a left eye prosthetic,   Pulm: normal work of breathing, improved wheezing, no rales or crackles were appreciated  CV: RRR, S1&S2+, no m/r/g appreciated, distant, +chemoport in right anterior chest wall (skin dry and intact)  ABDOMEN: soft, nt, nd, no hepatosplenomegaly.   MSK: ROM wnl in upper and lower extremities, no gross abnormalities noted  EXTREMITIES:  no edama, clubbing, or cyanosis  Neuro: A&Ox3, fluent speech, strength 5/5 in the upper and lower extremities, sensation intact in the upper and lower extremities.   SKIN: warm and dry. +vesicular, pustule rash, no well defined border in the R upper and lower quadrant regions. Unilateral, does not cross the midline in the front and does not spread to the back                           11.3   8.8   )-----------( 230      ( 29 Aug 2019 07:09 )             36.7     08-30    142  |  101  |  18  ----------------------------<  201<H>  4.2   |  30  |  0.70    Ca    9.1      30 Aug 2019 06:59  Phos  3.3     08-30  Mg     2.0     08-30        CAPILLARY BLOOD GLUCOSE      POCT Blood Glucose.: 237 mg/dL (29 Aug 2019 21:42)  POCT Blood Glucose.: 202 mg/dL (29 Aug 2019 17:42)  POCT Blood Glucose.: 200 mg/dL (29 Aug 2019 13:10)  POCT Blood Glucose.: 142 mg/dL (29 Aug 2019 08:50)      MEDICATIONS  (STANDING):  ALBUTerol/ipratropium for Nebulization 3 milliLiter(s) Nebulizer every 6 hours  apixaban 5 milliGRAM(s) Oral every 12 hours  atorvastatin 40 milliGRAM(s) Oral at bedtime  buDESOnide    Inhalation Suspension 0.5 milliGRAM(s) Inhalation daily  chlorhexidine 2% Cloths 1 Application(s) Topical daily  dextrose 5%. 1000 milliLiter(s) (50 mL/Hr) IV Continuous <Continuous>  dextrose 50% Injectable 12.5 Gram(s) IV Push once  dextrose 50% Injectable 25 Gram(s) IV Push once  dextrose 50% Injectable 25 Gram(s) IV Push once  digoxin     Tablet 0.25 milliGRAM(s) Oral daily  hydrocortisone 1% Cream 1 Application(s) Topical two times a day  insulin glargine Injectable (LANTUS) 10 Unit(s) SubCutaneous every morning  insulin lispro (HumaLOG) corrective regimen sliding scale   SubCutaneous three times a day before meals  insulin lispro (HumaLOG) corrective regimen sliding scale   SubCutaneous at bedtime  methylPREDNISolone sodium succinate Injectable 20 milliGRAM(s) IV Push every 12 hours  montelukast 10 milliGRAM(s) Oral daily  neomycin/BACItracin/polymyxin Topical Ointment 1 Application(s) Topical daily  pantoprazole    Tablet 40 milliGRAM(s) Oral before breakfast  tiotropium 18 MICROgram(s) Capsule 1 Capsule(s) Inhalation daily    MEDICATIONS  (PRN):  acetaminophen   Tablet .. 650 milliGRAM(s) Oral every 6 hours PRN Mild Pain (1 - 3), Moderate Pain (4 - 6)  benzocaine 15 mG/menthol 3.6 mG Lozenge 1 Lozenge Oral five times a day PRN Mouth Sores  dextrose 40% Gel 15 Gram(s) Oral once PRN Blood Glucose LESS THAN 70 milliGRAM(s)/deciliter  docusate sodium 200 milliGRAM(s) Oral two times a day PRN Constipation  fluticasone propionate 50 MICROgram(s)/spray Nasal Spray 1 Spray(s) Both Nostrils once PRN nasal congestion  glucagon  Injectable 1 milliGRAM(s) IntraMuscular once PRN Glucose LESS THAN 70 milligrams/deciliter  polyethylene glycol 3350 17 Gram(s) Oral two times a day PRN Constipation  sodium chloride 0.65% Nasal 1 Spray(s) Both Nostrils two times a day PRN Nasal Congestion

## 2019-08-30 NOTE — PROGRESS NOTE ADULT - PROBLEM SELECTOR PLAN 1
Pt with hx of severe persistent asthma, 10+ intubations (last one 5yrs ago), presents with SOB and dyspnea likely asthma exacerbation 2/2 to viral infection  - RVP + entero/rhino virus  - procalcitonin wnl   - provide supportive care  -c/w singulair, budesonide, spiriva and duonebs Q6H  - saturating well on RA  -follows with Dr. Allison for pulmonology  - c/w with IV steroids change to PO as tolerated  - c/w chest PT/Acapella/incentive spirometer  - f/u with CXR  - if cont with minimal improvement, consider adding azithromycin for anti-inflammatory effect  - pulmonology following, appreciated recs Pt with hx of severe persistent asthma, 10+ intubations (last one 5yrs ago), presents with SOB and dyspnea likely asthma exacerbation 2/2 to viral infection  - RVP + entero/rhino virus  - procalcitonin wnl   - provide supportive care  -c/w singulair, budesonide, spiriva and duonebs Q6H  - saturating well on RA  -follows with Dr. Allison for pulmonology  - c/w with IV steroids change to PO as tolerated  - started on azithromycin 250mg 3x a week   - c/w chest PT/Acapella/incentive spirometer  - f/u with CXR  - if cont with minimal improvement, consider adding azithromycin for anti-inflammatory effect  - pulmonology following, appreciated recs Pt with hx of severe persistent asthma, 10+ intubations (last one 5yrs ago), presents with SOB and dyspnea likely asthma exacerbation 2/2 to viral infection  - RVP + entero/rhino virus  - procalcitonin wnl   - provide supportive care  -c/w singulair, budesonide, spiriva and duonebs Q6H  - saturating well on RA  -follows with Dr. Allison for pulmonology  - c/w with IV steroids change to PO as tolerated  - started on azithromycin 250mg 3x/week   - c/w chest PT/Acapella/incentive spirometer  - CXR 8/30: clear lungs, unchanged from previous cxr on 8/26  - pulmonology following, appreciated recs

## 2019-08-30 NOTE — PROGRESS NOTE ADULT - PROBLEM SELECTOR PLAN 7
-patient with itchy rash, vesicular on right trunk in a scattered distribution. Low suspicion for shingles.   - rash is improving  - c/w neosporin

## 2019-08-30 NOTE — PROGRESS NOTE ADULT - ASSESSMENT
The patient is a 69 y/o woman with asthma (on long term medrol) c/b adrenal insufficiency, afib on eliquis, DM2, TIA, colon cancer (s/p resection, chemo, RT), tracheobronchomalacia s/p bronchial thermoplasty who presented to the ED with SOB and dyspnea on exertion most likely due to asthma exacerbation 2/2 to viral infection. Started on Azithromycin for COPD exacerbation ppx.

## 2019-08-31 ENCOUNTER — TRANSCRIPTION ENCOUNTER (OUTPATIENT)
Age: 69
End: 2019-08-31

## 2019-08-31 LAB
ANION GAP SERPL CALC-SCNC: 12 MMOL/L — SIGNIFICANT CHANGE UP (ref 5–17)
BUN SERPL-MCNC: 23 MG/DL — SIGNIFICANT CHANGE UP (ref 7–23)
CALCIUM SERPL-MCNC: 9.2 MG/DL — SIGNIFICANT CHANGE UP (ref 8.4–10.5)
CHLORIDE SERPL-SCNC: 101 MMOL/L — SIGNIFICANT CHANGE UP (ref 96–108)
CO2 SERPL-SCNC: 28 MMOL/L — SIGNIFICANT CHANGE UP (ref 22–31)
CREAT SERPL-MCNC: 0.7 MG/DL — SIGNIFICANT CHANGE UP (ref 0.5–1.3)
GLUCOSE BLDC GLUCOMTR-MCNC: 207 MG/DL — HIGH (ref 70–99)
GLUCOSE BLDC GLUCOMTR-MCNC: 209 MG/DL — HIGH (ref 70–99)
GLUCOSE BLDC GLUCOMTR-MCNC: 214 MG/DL — HIGH (ref 70–99)
GLUCOSE BLDC GLUCOMTR-MCNC: 254 MG/DL — HIGH (ref 70–99)
GLUCOSE SERPL-MCNC: 185 MG/DL — HIGH (ref 70–99)
HCT VFR BLD CALC: 37 % — SIGNIFICANT CHANGE UP (ref 34.5–45)
HGB BLD-MCNC: 11.4 G/DL — LOW (ref 11.5–15.5)
MAGNESIUM SERPL-MCNC: 1.9 MG/DL — SIGNIFICANT CHANGE UP (ref 1.6–2.6)
MCHC RBC-ENTMCNC: 23.3 PG — LOW (ref 27–34)
MCHC RBC-ENTMCNC: 30.9 GM/DL — LOW (ref 32–36)
MCV RBC AUTO: 75.4 FL — LOW (ref 80–100)
PHOSPHATE SERPL-MCNC: 3.9 MG/DL — SIGNIFICANT CHANGE UP (ref 2.5–4.5)
PLATELET # BLD AUTO: 248 K/UL — SIGNIFICANT CHANGE UP (ref 150–400)
POTASSIUM SERPL-MCNC: 4 MMOL/L — SIGNIFICANT CHANGE UP (ref 3.5–5.3)
POTASSIUM SERPL-SCNC: 4 MMOL/L — SIGNIFICANT CHANGE UP (ref 3.5–5.3)
RBC # BLD: 4.91 M/UL — SIGNIFICANT CHANGE UP (ref 3.8–5.2)
RBC # FLD: 14.8 % — HIGH (ref 10.3–14.5)
SODIUM SERPL-SCNC: 141 MMOL/L — SIGNIFICANT CHANGE UP (ref 135–145)
WBC # BLD: 10.8 K/UL — HIGH (ref 3.8–10.5)
WBC # FLD AUTO: 10.8 K/UL — HIGH (ref 3.8–10.5)

## 2019-08-31 PROCEDURE — 93010 ELECTROCARDIOGRAM REPORT: CPT

## 2019-08-31 PROCEDURE — 99233 SBSQ HOSP IP/OBS HIGH 50: CPT

## 2019-08-31 PROCEDURE — 99233 SBSQ HOSP IP/OBS HIGH 50: CPT | Mod: GC

## 2019-08-31 RX ORDER — AZITHROMYCIN 500 MG/1
250 TABLET, FILM COATED ORAL ONCE
Refills: 0 | Status: COMPLETED | OUTPATIENT
Start: 2019-08-31 | End: 2019-08-31

## 2019-08-31 RX ADMIN — POLYETHYLENE GLYCOL 3350 17 GRAM(S): 17 POWDER, FOR SOLUTION ORAL at 22:41

## 2019-08-31 RX ADMIN — Medication 4: at 09:53

## 2019-08-31 RX ADMIN — APIXABAN 5 MILLIGRAM(S): 2.5 TABLET, FILM COATED ORAL at 18:32

## 2019-08-31 RX ADMIN — Medication 3 MILLILITER(S): at 11:43

## 2019-08-31 RX ADMIN — ATORVASTATIN CALCIUM 40 MILLIGRAM(S): 80 TABLET, FILM COATED ORAL at 23:58

## 2019-08-31 RX ADMIN — AZITHROMYCIN 250 MILLIGRAM(S): 500 TABLET, FILM COATED ORAL at 22:40

## 2019-08-31 RX ADMIN — Medication 4: at 18:28

## 2019-08-31 RX ADMIN — Medication 200 MILLIGRAM(S): at 13:22

## 2019-08-31 RX ADMIN — APIXABAN 5 MILLIGRAM(S): 2.5 TABLET, FILM COATED ORAL at 06:31

## 2019-08-31 RX ADMIN — MONTELUKAST 10 MILLIGRAM(S): 4 TABLET, CHEWABLE ORAL at 13:19

## 2019-08-31 RX ADMIN — Medication 6: at 14:32

## 2019-08-31 RX ADMIN — BACITRACIN ZINC NEOMYCIN SULFATE POLYMYXIN B SULFATE 1 APPLICATION(S): 400; 3.5; 5 OINTMENT TOPICAL at 13:19

## 2019-08-31 RX ADMIN — Medication 20 MILLIGRAM(S): at 06:32

## 2019-08-31 RX ADMIN — Medication 20 MILLIGRAM(S): at 18:29

## 2019-08-31 RX ADMIN — CHLORHEXIDINE GLUCONATE 1 APPLICATION(S): 213 SOLUTION TOPICAL at 13:35

## 2019-08-31 RX ADMIN — POLYETHYLENE GLYCOL 3350 17 GRAM(S): 17 POWDER, FOR SOLUTION ORAL at 13:17

## 2019-08-31 RX ADMIN — Medication 3 MILLILITER(S): at 00:17

## 2019-08-31 RX ADMIN — INSULIN GLARGINE 10 UNIT(S): 100 INJECTION, SOLUTION SUBCUTANEOUS at 09:50

## 2019-08-31 RX ADMIN — Medication 3 MILLILITER(S): at 23:54

## 2019-08-31 RX ADMIN — Medication 3 MILLILITER(S): at 06:08

## 2019-08-31 RX ADMIN — TIOTROPIUM BROMIDE 1 CAPSULE(S): 18 CAPSULE ORAL; RESPIRATORY (INHALATION) at 11:40

## 2019-08-31 RX ADMIN — Medication 3 MILLILITER(S): at 17:37

## 2019-08-31 RX ADMIN — Medication 0.5 MILLIGRAM(S): at 11:42

## 2019-08-31 RX ADMIN — Medication 0.25 MILLIGRAM(S): at 06:31

## 2019-08-31 RX ADMIN — PANTOPRAZOLE SODIUM 40 MILLIGRAM(S): 20 TABLET, DELAYED RELEASE ORAL at 06:32

## 2019-08-31 NOTE — PROGRESS NOTE ADULT - ASSESSMENT
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  Has been undergoing extensive work up over the past month for progressive KRAMER including recent TTE, V/Q scan and CT chest. Notable findings for right sided peripheral lung nodules, unchanged from prior CTs. Echo with mod-severe AR and moderately dilated LA, diastolic dysfunction, normal RSVP.   Most recent PFTs with evidence of severe obstructive lung disease.   Last admitted 2/2019 acute hypoxic resp failure 2/2 coronovirus respiratory infection.   Has received antibiotics as an outpatient, most recently in July. Also most recent PET/CT 7/3/19 which shows mildly FDG-avid peripheral nodular opacity RML and anterior RLL nodule - unchanged size from prior.     Becomes dyspneic with minimal exertion.    Presented overnight on 8/26 with 1 day of productive cough, low grade fever of 100 F (while on chronic steroids) and chest tightness, feeling as if her "chest was caving in". In the ED received Solumedrol 40 IV and Duonebs x3. CXR does not show any acute pathology. 98% on RA and is speaking in full sentences.   Denies sick contacts.   RVP +entero/rhinovirus.   Anxious and upset as she is very concerned about her ongoing symptoms and the underlying etiology.      A/P: Extensive history as per above with chronic KRAMER, worsening and associated with productive cough and chest tightness over the past 24 hours.  1. RVP positive - likely viral bronchitis  c/w bronchodilators, Budesonide nebs BID, Spiriva  Solumedrol 20 mg IV BID- can transition to Prednisone to 40 mg as get closer to discharge and taper down to home dose slowly   Airway clearance therapy - acapella device 4x a day, incentive spirometer  Supplemental O2 as needed, goal sats 92-96%  As she continues to experience minimal improvement with IV steroids - c/w  Azithromycin 250 mg 3x a week for anti-inflammatory effects  Superimposed bacterial bronchitis - c/w Levaquin 500 mg QD for 5 days. F/u repeat sputum cultures    2. Dyspnea -   Likely acute exacerbation of her obstructive lung disease  Cardiology service consulted and do not believe there is a cardiac component to her dyspnea given stable findings on TTE and no evidence of pHTN  -c/w home med regimen    3. Lung nodules - not likely the cause of her symptoms of dyspnea  unclear etiology, mildly PET-FDG avid and unchanged in size despite antibiotics since Feb 2019 after her acute resp infection. Consider biopsy - transthoracic approach may be best, will d/w Thoracic radiology but this work up should be done as an outpatient.     4. DVT ppx    Will follow up.       Attending Attestation:   I was physically present for the key portions of the evaluation and management (E/M) service provided.  I agree with the above history, physical, and plan which I have reviewed and edited where appropriate. 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  Has been undergoing extensive work up over the past month for progressive KRAMER including recent TTE, V/Q scan and CT chest. Notable findings for right sided peripheral lung nodules, unchanged from prior CTs. Echo with mod-severe AR and moderately dilated LA, diastolic dysfunction, normal RSVP.   Most recent PFTs with evidence of severe obstructive lung disease.   Last admitted 2/2019 acute hypoxic resp failure 2/2 coronovirus respiratory infection.   Has received antibiotics as an outpatient, most recently in July. Also most recent PET/CT 7/3/19 which shows mildly FDG-avid peripheral nodular opacity RML and anterior RLL nodule - unchanged size from prior.     Becomes dyspneic with minimal exertion.    Presented overnight on 8/26 with 1 day of productive cough, low grade fever of 100 F (while on chronic steroids) and chest tightness, feeling as if her "chest was caving in". In the ED received Solumedrol 40 IV and Duonebs x3. CXR does not show any acute pathology. 98% on RA and is speaking in full sentences.   Denies sick contacts.   RVP +entero/rhinovirus.   Anxious and upset as she is very concerned about her ongoing symptoms and the underlying etiology.      A/P: Extensive history as per above with chronic KRAMER, worsening and associated with productive cough and chest tightness.  1. RVP positive - likely viral bronchitis  c/w bronchodilators, Budesonide nebs BID, Spiriva  Solumedrol 20 mg IV BID- can transition to Prednisone to 40 mg as get closer to discharge and taper down to home dose slowly   Airway clearance therapy - acapella device 4x a day, incentive spirometer  Supplemental O2 as needed, goal sats 92-96%  As she continues to experience minimal improvement with IV steroids - c/w  Azithromycin 250 mg 3x a week for anti-inflammatory effects  Superimposed bacterial bronchitis - c/w Levaquin 500 mg QD for 5 days. F/u repeat sputum cultures    2. Dyspnea -   Likely acute exacerbation of her obstructive lung disease  Cardiology service consulted and do not believe there is a cardiac component to her dyspnea given stable findings on TTE and no evidence of pHTN  -c/w home med regimen    3. Lung nodules - not likely the cause of her symptoms of dyspnea  unclear etiology, mildly PET-FDG avid and unchanged in size despite antibiotics since Feb 2019 after her acute resp infection. Consider biopsy - transthoracic approach may be best, will d/w Thoracic radiology but this work up should be done as an outpatient.     4. DVT ppx    Will follow up.       Attending Attestation:   I was physically present for the key portions of the evaluation and management (E/M) service provided.  I agree with the above history, physical, and plan which I have reviewed and edited where appropriate.

## 2019-08-31 NOTE — PROGRESS NOTE ADULT - PROBLEM SELECTOR PLAN 2
Patient with increased dyspnea despite txment for asthma, was suspicious for cardiac component in the setting of AR  -pt EKG with p-wave inversions in V5, II, this abnormality is seen in previous EKG from 4/2019  -TTE 8/23: +Moderate-severe aortic regurgitation, Moderate left atrial enlargement.  -cardiac enzymes wnl  -Strict I's/O's and daily weights.  - no cardiac interventions recommended at this time   - cardiology following, appreciated recs

## 2019-08-31 NOTE — DISCHARGE NOTE PROVIDER - NSDCFUSCHEDAPPT_GEN_ALL_CORE_FT
RAYRAY RODRIGUEZ ; 09/12/2019 ; NPP Cardio [address] RAYRAY Rapp ; 09/12/2019 ; NPP PulmMed [address] [facility]  RAYRAY RODRIGUEZ ; 10/22/2019 ; NPP Rad Cat [address] RAYRAY RODRIGUEZ ; 09/12/2019 ; NPP Cardio [address] RAYRAY Rapp ; 09/12/2019 ; NPP PulmMed [address] [facility]  RAYRAY RODRIGUEZ ; 10/22/2019 ; NPP Rad Cat [address] RAYRAY RODRIGUEZ ; 09/12/2019 ; NPP Cardio [address] RAYRAY Rapp ; 09/12/2019 ; NPP PulmMed [address] [facility]  RAYRAY RODRIGUEZ ; 10/22/2019 ; NPP Rad Cat [address] RAYRAY RODRIGUEZ ; 09/12/2019 ; NPP Cardio [address] RAYRAY Rapp ; 09/12/2019 ; NPP PulmMed [address] [facility]  RAYRAY RODRIGUEZ ; 10/22/2019 ; NPP Rad Cat [address] RAYRAY RODRIGUEZ ; 09/12/2019 ; NPP Cardio [address] RAYRAY Rapp ; 09/12/2019 ; NPP PulmMed [address] [facility]  RAYRAY RODRIGUEZ ; 10/22/2019 ; NPP Rad Cat [address]

## 2019-08-31 NOTE — PROGRESS NOTE ADULT - PROBLEM SELECTOR PLAN 4
-ostomy care orders  -functioning, clean, ctm  -functional Chemoport in place  - bowel movement s/p enema by ostomy nurse

## 2019-08-31 NOTE — DISCHARGE NOTE PROVIDER - CARE PROVIDER_API CALL
Eulalio Allison (MD)  Internal Medicine; Pulmonary Disease  [address], [address]  [place] [address]  Phone: [phone]  Fax: [phone]  Follow Up Time: 1 week    Steven Leahy)  Cardiovascular Disease  [address] [place] [address]  Phone: [phone]  Fax: [phone]  Follow Up Time:

## 2019-08-31 NOTE — DISCHARGE NOTE PROVIDER - HOSPITAL COURSE
HPI:    The patient is a 67 y/o woman with asthma (on long term medrol) c/b adrenal insufficiency, afib on eliquis, DM2, TIA, colon cancer (s/p resection, chemo, RT), tracheobronchomalacia s/p bronchial thermoplasty who presented to the ED with SOB and dyspnea on exertion. Patient states this has been getting progressively worse for months. She had a V/Q scan as an outpatient as well as a CT chest. V/Q was low possibility of PE, CT chest showed two stable right lung nodules and a RML pleural opacity that is unchanged from last study. Patient reports cough, productive of yellowish sputum. No fever, chills, sick contacts. No nausea, vomiting, diarrhea, hematuria, dysuria, chest pain. No recent travel. Patient called EMS and recieved a nebulizer treatment en route to [facility].        ED Course:    Patient was given solumedrol 40 IV and duonebs.         Hospital Course:     Patient arrived to the unit afebrile and hemodynamically stable. She was treated with IV steroids and later transitioned to PO steroids with improvement in wheezing, saturating well in RA. Pulmonology was consulted and patient was started on azithromycin 250mg 3 times a week for anti-inflammatory effect and Levaquin 500mg daily for a 5 day course for likely superimposed bacterial bronchitis.         Patient to continue taking antibiotics at home until __________. Patient to follow up with her pulmonologist, Dr. Allison, for further management in the community. HPI:    The patient is a 69 y/o woman with asthma (on long term medrol) c/b adrenal insufficiency, afib on eliquis, DM2, TIA, colon cancer (s/p resection, chemo, RT), tracheobronchomalacia s/p bronchial thermoplasty who presented to the ED with SOB and dyspnea on exertion. Patient states this has been getting progressively worse for months. She had a V/Q scan as an outpatient as well as a CT chest. V/Q was low possibility of PE, CT chest showed two stable right lung nodules and a RML pleural opacity that is unchanged from last study. Patient reports cough, productive of yellowish sputum. No fever, chills, sick contacts. No nausea, vomiting, diarrhea, hematuria, dysuria, chest pain. No recent travel. Patient called EMS and recieved a nebulizer treatment en route to [facility].        ED Course:    Patient was given solumedrol 40 IV and duonebs.         Hospital Course:     Patient arrived to the unit afebrile and hemodynamically stable. She was treated with IV steroids and later transitioned to PO steroids with improvement in wheezing, saturating well in RA. Pulmonology was consulted and patient was started on azithromycin 250mg 3 times a week for anti-inflammatory effect and Levaquin 500mg daily for a 5 day course for likely superimposed bacterial bronchitis.     Patient to continue taking azithromycin 3x week at home for anti-inflammatory effect. Will taper steroids slowly (Medrol 32mg for 5 days, 20mg for 5 days, 8mg for 5 days, then 4mg thereafter). Patient to follow up with her pulmonologist, Dr. Allison, for further management upon discharge. HPI:    The patient is a 67 y/o woman with asthma (on long term medrol) c/b adrenal insufficiency, afib on eliquis, DM2, TIA, colon cancer (s/p resection, chemo, RT), tracheobronchomalacia s/p bronchial thermoplasty who presented to the ED with SOB and dyspnea on exertion. Patient states this has been getting progressively worse for months. She had a V/Q scan as an outpatient as well as a CT chest. V/Q was low possibility of PE, CT chest showed two stable right lung nodules and a RML pleural opacity that is unchanged from last study. Patient reports cough, productive of yellowish sputum. No fever, chills, sick contacts. No nausea, vomiting, diarrhea, hematuria, dysuria, chest pain. No recent travel. Patient called EMS and recieved a nebulizer treatment en route to [facility].        ED Course:    Patient was given solumedrol 40 IV and duonebs.         Hospital Course:     Patient arrived to the unit afebrile and hemodynamically stable. She was treated with IV steroids and later transitioned to PO steroids with improvement in wheezing, saturating well in RA. Pulmonology was consulted and patient was started on azithromycin 250mg 3 times a week for anti-inflammatory effect and Levaquin 500mg daily for a 5 day course for likely superimposed bacterial bronchitis.     Patient to continue taking azithromycin 3x week at home for anti-inflammatory effect. Will taper steroids slowly (Medrol 32mg for 5 days, 20mg for 5 days, 12mg for 5 days, 8mg for 5 days, then 4mg thereafter). Patient to follow up with her pulmonologist, Dr. Allison, for further management upon discharge. HPI:    The patient is a 67 y/o woman with asthma (on long term medrol) c/b adrenal insufficiency, afib on eliquis, DM2, TIA, colon cancer (s/p resection, chemo, RT), tracheobronchomalacia s/p bronchial thermoplasty who presented to the ED with SOB and dyspnea on exertion. Patient states this has been getting progressively worse for months. She had a V/Q scan as an outpatient as well as a CT chest. V/Q was low possibility of PE, CT chest showed two stable right lung nodules and a RML pleural opacity that is unchanged from last study. Patient reports cough, productive of yellowish sputum. No fever, chills, sick contacts. No nausea, vomiting, diarrhea, hematuria, dysuria, chest pain. No recent travel. Patient called EMS and recieved a nebulizer treatment en route to [facility].        ED Course:    Patient was given solumedrol 40 IV and duonebs.         Hospital Course:     Patient arrived to the unit afebrile and hemodynamically stable. She was treated with IV steroids and later transitioned to PO steroids with improvement in wheezing, saturating well in RA. Pulmonology was consulted and patient was started on azithromycin 250mg 3 times a week for anti-inflammatory effect and Levaquin 500mg daily for a 5 day course for likely superimposed bacterial bronchitis.     Patient to continue taking azithromycin 3x week at home for anti-inflammatory effect. Will taper steroids slowly (Medrol 32mg for 5 days, 16mg for 5 days, 8mg for 5 days, then 4mg thereafter). Patient to follow up with her pulmonologist, Dr. Allison, for further management upon discharge.

## 2019-08-31 NOTE — DISCHARGE NOTE PROVIDER - NSDCCPCAREPLAN_GEN_ALL_CORE_FT
PRINCIPAL DISCHARGE DIAGNOSIS  Diagnosis: Severe persistent asthma with exacerbation  Assessment and Plan of Treatment: Severe persistent asthma with exacerbation      SECONDARY DISCHARGE DIAGNOSES  Diagnosis: Asthma  Assessment and Plan of Treatment: PRINCIPAL DISCHARGE DIAGNOSIS  Diagnosis: Severe persistent asthma with exacerbation  Assessment and Plan of Treatment: You were admitted due to an asthma exacerbation. You were seen by the Pulmonology team and treated with IV steroid and antibiotics for possible bronchitis. You completed 5 days of antibiotic. You will be discharged on a Medrol taper. Please take Medrol 32mg (8 tabs daily) for 5 days starting 9/4, then starting 9/9 take 20mg (5 tabs daily) for 5 days, then starting 9/14 take 12mg (3 tabs daily) for 5 days, then starting 9/19 take 8mg (2 tabs daily) for 5 days, then resume Medrol 4mg daily. Please make sure to follow up with your Pulmonologist Dr. Allison within 1-2 weeks.      SECONDARY DISCHARGE DIAGNOSES  Diagnosis: Asthma  Assessment and Plan of Treatment: Please follow taper as above and follow up with Pulmonologist Dr. Allison in clinic. PRINCIPAL DISCHARGE DIAGNOSIS  Diagnosis: Severe persistent asthma with exacerbation  Assessment and Plan of Treatment: You were admitted due to an asthma exacerbation. You were seen by the Pulmonology team and treated with IV steroid and antibiotics for possible bronchitis. You completed 5 days of antibiotic. You will be discharged on a Medrol taper. Please take Medrol 32mg for 5 days starting 9/4, then starting 9/9 take 16mg for 5 days, then starting 9/14 take 8mg for 5 days, then starting 9/19 resume 4mg daily. Please make sure to follow up with your Pulmonologist Dr. Allison within 1-2 weeks.      SECONDARY DISCHARGE DIAGNOSES  Diagnosis: Asthma  Assessment and Plan of Treatment: Please follow taper as above and follow up with Pulmonologist Dr. Allison in clinic.

## 2019-08-31 NOTE — PROGRESS NOTE ADULT - ASSESSMENT
The patient is a 67 y/o woman with asthma (on long term medrol) c/b adrenal insufficiency, afib on eliquis, DM2, TIA, colon cancer (s/p resection, chemo, RT), tracheobronchomalacia s/p bronchial thermoplasty who presented to the ED with SOB and dyspnea on exertion most likely due to asthma exacerbation 2/2 to viral infection. Started on Levaquin 500mg x 5 days.

## 2019-08-31 NOTE — PROGRESS NOTE ADULT - PROBLEM SELECTOR PLAN 1
Pt with hx of severe persistent asthma, 10+ intubations (last one 5yrs ago), presents with SOB and dyspnea likely asthma exacerbation 2/2 to viral infection  - RVP + entero/rhino virus  - procalcitonin wnl   - provide supportive care  -c/w singulair, budesonide, spiriva and duonebs Q6H  - saturating well on RA  -follows with Dr. Allison for pulmonology  - c/w with IV steroids change to PO as tolerated  - started on azithromycin 250mg 3x/week?  - started on Levaquin 500mg daily x 5 days for possible superimposed bacterial bronchitis   - c/w chest PT/Acapella/incentive spirometer  - CXR 8/30: clear lungs, unchanged from previous cxr on 8/26  - pulmonology following, appreciated recs Pt with hx of severe persistent asthma, 10+ intubations (last one 5yrs ago), presents with SOB and dyspnea likely asthma exacerbation 2/2 to viral infection  - RVP + entero/rhino virus  - procalcitonin wnl   - provide supportive care  -c/w singulair, budesonide, spiriva and duonebs Q6H  - saturating well on RA  -follows with Dr. Allison for pulmonology  - c/w with IV steroids change to PO as tolerated  - c/w azithromycin 250mg 3x/week?  - started on Levaquin 500mg daily x 5 days for possible superimposed bacterial bronchitis   - c/w chest PT/Acapella/incentive spirometer  - CXR 8/30: clear lungs, unchanged from previous cxr on 8/26  - pulmonology following, appreciated recs Pt with hx of severe persistent asthma, 10+ intubations (last one 5yrs ago), presents with SOB and dyspnea likely asthma exacerbation 2/2 to viral infection  - RVP + entero/rhino virus  - procalcitonin wnl   - provide supportive care  -c/w singulair, budesonide, spiriva and duonebs Q6H  - saturating well on RA  -follows with Dr. Allison for pulmonology  - c/w with IV steroids change to PO as tolerated  - c/w azithromycin 250mg 3x/week for COPD exacerbation ppx?  - started on Levaquin 500mg daily x 5 days for possible superimposed bacterial bronchitis   - c/w chest PT/Acapella/incentive spirometer  - CXR 8/30: clear lungs, unchanged from previous cxr on 8/26  - pulmonology following, appreciated recs

## 2019-08-31 NOTE — PROGRESS NOTE ADULT - SUBJECTIVE AND OBJECTIVE BOX
Olamide John PGY1  Pager: [phone]    Chief Complaint: 67 y/o woman with asthma (on long term medrol) c/b adrenal insufficiency, afib on eliquis, DM2, colon cancer, tracheobronchomalacia s/p bronchial thermoplasty who presented to the ED with SOB       Subjective: Patient was awake and alert. No longer has SOB when speaking for long periods of time. She is eating well, and her ostomy bag is dry and clean. Had bowel movement yesterday s/p enema. Patient denies any chest pain, headaches, dizziness, n/v, abd pain, dysuria, or hematuria.      VITAL SIGNS:  Vital Signs Last 24 Hrs  T(C): 36.6 (31 Aug 2019 04:17), Max: 36.8 (30 Aug 2019 14:29)  T(F): 97.9 (31 Aug 2019 04:17), Max: 98.2 (30 Aug 2019 14:29)  HR: 72 (31 Aug 2019 06:09) (62 - 92)  BP: 105/52 (31 Aug 2019 04:17) (105/52 - 132/72)  BP(mean): --  RR: 18 (31 Aug 2019 04:17) (18 - 18)  SpO2: 98% (31 Aug 2019 06:09) (94% - 98%)      PHYSICAL EXAM:   GENERAL: middle age women, wearing hospital gown, well groomed, sitting comfortably in bed in NAD    HEENT: right eye PERRL, MMM, +erythema in posterior pharynx. has a left eye prosthetic,   Pulm: normal work of breathing, improved wheezing, no rales or crackles were appreciated  CV: RRR, S1&S2+, no m/r/g appreciated, distant, +chemoport in right anterior chest wall (skin dry and intact)  ABDOMEN: soft, nt, nd, no hepatosplenomegaly.   MSK: ROM wnl in upper and lower extremities, no gross abnormalities noted  EXTREMITIES:  no edama, clubbing, or cyanosis  Neuro: A&Ox3, fluent speech, strength 5/5 in the upper and lower extremities, sensation intact in the upper and lower extremities.   SKIN: warm and dry. +vesicular, pustule rash, no well defined border in the R upper and lower quadrant regions. Unilateral, does not cross the midline in the front and does not spread to the back                           11.3   9.2   )-----------( 245      ( 30 Aug 2019 06:59 )             37.5     08-30    142  |  101  |  18  ----------------------------<  201<H>  4.2   |  30  |  0.70    Ca    9.1      30 Aug 2019 06:59  Phos  3.3     08-30  Mg     2.0     08-30        CAPILLARY BLOOD GLUCOSE      POCT Blood Glucose.: 252 mg/dL (30 Aug 2019 21:51)  POCT Blood Glucose.: 220 mg/dL (30 Aug 2019 18:06)  POCT Blood Glucose.: 223 mg/dL (30 Aug 2019 13:12)  POCT Blood Glucose.: 194 mg/dL (30 Aug 2019 10:25)      MEDICATIONS  (STANDING):  ALBUTerol/ipratropium for Nebulization 3 milliLiter(s) Nebulizer every 6 hours  apixaban 5 milliGRAM(s) Oral every 12 hours  atorvastatin 40 milliGRAM(s) Oral at bedtime  buDESOnide    Inhalation Suspension 0.5 milliGRAM(s) Inhalation daily  chlorhexidine 2% Cloths 1 Application(s) Topical daily  dextrose 5%. 1000 milliLiter(s) (50 mL/Hr) IV Continuous <Continuous>  dextrose 50% Injectable 12.5 Gram(s) IV Push once  dextrose 50% Injectable 25 Gram(s) IV Push once  dextrose 50% Injectable 25 Gram(s) IV Push once  digoxin     Tablet 0.25 milliGRAM(s) Oral daily  hydrocortisone 1% Cream 1 Application(s) Topical two times a day  insulin glargine Injectable (LANTUS) 10 Unit(s) SubCutaneous every morning  insulin lispro (HumaLOG) corrective regimen sliding scale   SubCutaneous three times a day before meals  insulin lispro (HumaLOG) corrective regimen sliding scale   SubCutaneous at bedtime  levoFLOXacin  Tablet 500 milliGRAM(s) Oral daily  methylPREDNISolone sodium succinate Injectable 20 milliGRAM(s) IV Push every 12 hours  montelukast 10 milliGRAM(s) Oral daily  neomycin/BACItracin/polymyxin Topical Ointment 1 Application(s) Topical daily  pantoprazole    Tablet 40 milliGRAM(s) Oral before breakfast  tiotropium 18 MICROgram(s) Capsule 1 Capsule(s) Inhalation daily    MEDICATIONS  (PRN):  acetaminophen   Tablet .. 650 milliGRAM(s) Oral every 6 hours PRN Mild Pain (1 - 3), Moderate Pain (4 - 6)  benzocaine 15 mG/menthol 3.6 mG Lozenge 1 Lozenge Oral five times a day PRN Mouth Sores  dextrose 40% Gel 15 Gram(s) Oral once PRN Blood Glucose LESS THAN 70 milliGRAM(s)/deciliter  docusate sodium 200 milliGRAM(s) Oral two times a day PRN Constipation  fluticasone propionate 50 MICROgram(s)/spray Nasal Spray 1 Spray(s) Both Nostrils once PRN nasal congestion  glucagon  Injectable 1 milliGRAM(s) IntraMuscular once PRN Glucose LESS THAN 70 milligrams/deciliter  polyethylene glycol 3350 17 Gram(s) Oral two times a day PRN Constipation  sodium chloride 0.65% Nasal 1 Spray(s) Both Nostrils two times a day PRN Nasal Congestion Olamide John PGY1  Pager: [phone]    Chief Complaint: 67 y/o woman with asthma (on long term medrol) c/b adrenal insufficiency, afib on eliquis, DM2, colon cancer, tracheobronchomalacia s/p bronchial thermoplasty who presented to the ED with SOB       Subjective: Patient was awake and alert. No longer has SOB when speaking for long periods of time, state she feel better and thinks she will be able to go home soon. She is eating well, and her ostomy bag is dry and clean. Had bowel movement yesterday s/p enema. Patient denies any chest pain, headaches, dizziness, n/v, abd pain, dysuria, or hematuria.      VITAL SIGNS:  Vital Signs Last 24 Hrs  T(C): 36.6 (31 Aug 2019 04:17), Max: 36.8 (30 Aug 2019 14:29)  T(F): 97.9 (31 Aug 2019 04:17), Max: 98.2 (30 Aug 2019 14:29)  HR: 72 (31 Aug 2019 06:09) (62 - 92)  BP: 105/52 (31 Aug 2019 04:17) (105/52 - 132/72)  BP(mean): --  RR: 18 (31 Aug 2019 04:17) (18 - 18)  SpO2: 98% (31 Aug 2019 06:09) (94% - 98%)      PHYSICAL EXAM:   GENERAL: middle age women, wearing hospital gown, well groomed, sitting comfortably in bed in NAD    HEENT: right eye PERRL, MMM, +erythema in posterior pharynx. has a left eye prosthetic,   Pulm: normal work of breathing, improved wheezing, no rales or crackles were appreciated  CV: RRR, S1&S2+, no m/r/g appreciated, distant, +chemoport in right anterior chest wall (skin dry and intact)  ABDOMEN: soft, nt, nd, no hepatosplenomegaly.   MSK: ROM wnl in upper and lower extremities, no gross abnormalities noted  EXTREMITIES:  no edama, clubbing, or cyanosis  Neuro: A&Ox3, fluent speech, strength 5/5 in the upper and lower extremities, sensation intact in the upper and lower extremities.   SKIN: warm and dry. +vesicular, pustule rash, no well defined border in the R upper and lower quadrant regions. Unilateral, does not cross the midline in the front and does not spread to the back                           11.3   9.2   )-----------( 245      ( 30 Aug 2019 06:59 )             37.5     08-30    142  |  101  |  18  ----------------------------<  201<H>  4.2   |  30  |  0.70    Ca    9.1      30 Aug 2019 06:59  Phos  3.3     08-30  Mg     2.0     08-30        CAPILLARY BLOOD GLUCOSE      POCT Blood Glucose.: 252 mg/dL (30 Aug 2019 21:51)  POCT Blood Glucose.: 220 mg/dL (30 Aug 2019 18:06)  POCT Blood Glucose.: 223 mg/dL (30 Aug 2019 13:12)  POCT Blood Glucose.: 194 mg/dL (30 Aug 2019 10:25)      MEDICATIONS  (STANDING):  ALBUTerol/ipratropium for Nebulization 3 milliLiter(s) Nebulizer every 6 hours  apixaban 5 milliGRAM(s) Oral every 12 hours  atorvastatin 40 milliGRAM(s) Oral at bedtime  buDESOnide    Inhalation Suspension 0.5 milliGRAM(s) Inhalation daily  chlorhexidine 2% Cloths 1 Application(s) Topical daily  dextrose 5%. 1000 milliLiter(s) (50 mL/Hr) IV Continuous <Continuous>  dextrose 50% Injectable 12.5 Gram(s) IV Push once  dextrose 50% Injectable 25 Gram(s) IV Push once  dextrose 50% Injectable 25 Gram(s) IV Push once  digoxin     Tablet 0.25 milliGRAM(s) Oral daily  hydrocortisone 1% Cream 1 Application(s) Topical two times a day  insulin glargine Injectable (LANTUS) 10 Unit(s) SubCutaneous every morning  insulin lispro (HumaLOG) corrective regimen sliding scale   SubCutaneous three times a day before meals  insulin lispro (HumaLOG) corrective regimen sliding scale   SubCutaneous at bedtime  levoFLOXacin  Tablet 500 milliGRAM(s) Oral daily  methylPREDNISolone sodium succinate Injectable 20 milliGRAM(s) IV Push every 12 hours  montelukast 10 milliGRAM(s) Oral daily  neomycin/BACItracin/polymyxin Topical Ointment 1 Application(s) Topical daily  pantoprazole    Tablet 40 milliGRAM(s) Oral before breakfast  tiotropium 18 MICROgram(s) Capsule 1 Capsule(s) Inhalation daily    MEDICATIONS  (PRN):  acetaminophen   Tablet .. 650 milliGRAM(s) Oral every 6 hours PRN Mild Pain (1 - 3), Moderate Pain (4 - 6)  benzocaine 15 mG/menthol 3.6 mG Lozenge 1 Lozenge Oral five times a day PRN Mouth Sores  dextrose 40% Gel 15 Gram(s) Oral once PRN Blood Glucose LESS THAN 70 milliGRAM(s)/deciliter  docusate sodium 200 milliGRAM(s) Oral two times a day PRN Constipation  fluticasone propionate 50 MICROgram(s)/spray Nasal Spray 1 Spray(s) Both Nostrils once PRN nasal congestion  glucagon  Injectable 1 milliGRAM(s) IntraMuscular once PRN Glucose LESS THAN 70 milligrams/deciliter  polyethylene glycol 3350 17 Gram(s) Oral two times a day PRN Constipation  sodium chloride 0.65% Nasal 1 Spray(s) Both Nostrils two times a day PRN Nasal Congestion

## 2019-08-31 NOTE — PROGRESS NOTE ADULT - SUBJECTIVE AND OBJECTIVE BOX
[facility] - Division of Pulmonary, Critical Care and Sleep Medicine   Please call [phone] between 8am-pm weekdays, [phone] after hours and weekends    Interval Events: No events overnight    REVIEW OF SYSTEMS:  CV: [ ] chest pain   Resp: [ ] cough [ ] shortness of breath   [ ] All other systems negative  [ ] Unable to assess ROS because ________    OBJECTIVE:  ICU Vital Signs Last 24 Hrs  T(C): 37 (31 Aug 2019 10:02), Max: 37 (31 Aug 2019 10:02)  T(F): 98.6 (31 Aug 2019 10:02), Max: 98.6 (31 Aug 2019 10:02)  HR: 78 (31 Aug 2019 11:54) (62 - 92)  BP: 140/75 (31 Aug 2019 10:02) (105/52 - 140/75)  BP(mean): --  ABP: --  ABP(mean): --  RR: 18 (31 Aug 2019 10:02) (18 - 18)  SpO2: 98% (31 Aug 2019 11:54) (94% - 98%)        08-30 @ 07:01  -  08-31 @ 07:00  --------------------------------------------------------  IN: 950 mL / OUT: 700 mL / NET: 250 mL      CAPILLARY BLOOD GLUCOSE      POCT Blood Glucose.: 214 mg/dL (31 Aug 2019 09:34)      PHYSICAL EXAM:  General: NAD  HEENT: NC/AT  Lymph Nodes: no cervical or supraclavicular lymphadenopathy  Neck: supple  Respiratory:  CTA b/l, no wheezes, crackles or rhonchi  Cardiovascular:  RRR, no m/r/g  Abdomen: soft, NT/ND, +BS  Extremities: no clubbing, cyanosis or edema, warm  Skin: no rash  Neurological: AAOx3, non focal exam  Psychiatry: not anxious appearing, normal affect and mood    HOSPITAL MEDICATIONS:  MEDICATIONS  (STANDING):  ALBUTerol/ipratropium for Nebulization 3 milliLiter(s) Nebulizer every 6 hours  apixaban 5 milliGRAM(s) Oral every 12 hours  atorvastatin 40 milliGRAM(s) Oral at bedtime  buDESOnide    Inhalation Suspension 0.5 milliGRAM(s) Inhalation daily  chlorhexidine 2% Cloths 1 Application(s) Topical daily  dextrose 5%. 1000 milliLiter(s) (50 mL/Hr) IV Continuous <Continuous>  dextrose 50% Injectable 12.5 Gram(s) IV Push once  dextrose 50% Injectable 25 Gram(s) IV Push once  dextrose 50% Injectable 25 Gram(s) IV Push once  digoxin     Tablet 0.25 milliGRAM(s) Oral daily  hydrocortisone 1% Cream 1 Application(s) Topical two times a day  insulin glargine Injectable (LANTUS) 10 Unit(s) SubCutaneous every morning  insulin lispro (HumaLOG) corrective regimen sliding scale   SubCutaneous three times a day before meals  insulin lispro (HumaLOG) corrective regimen sliding scale   SubCutaneous at bedtime  levoFLOXacin  Tablet 500 milliGRAM(s) Oral daily  methylPREDNISolone sodium succinate Injectable 20 milliGRAM(s) IV Push every 12 hours  montelukast 10 milliGRAM(s) Oral daily  neomycin/BACItracin/polymyxin Topical Ointment 1 Application(s) Topical daily  pantoprazole    Tablet 40 milliGRAM(s) Oral before breakfast  tiotropium 18 MICROgram(s) Capsule 1 Capsule(s) Inhalation daily    MEDICATIONS  (PRN):  acetaminophen   Tablet .. 650 milliGRAM(s) Oral every 6 hours PRN Mild Pain (1 - 3), Moderate Pain (4 - 6)  benzocaine 15 mG/menthol 3.6 mG Lozenge 1 Lozenge Oral five times a day PRN Mouth Sores  dextrose 40% Gel 15 Gram(s) Oral once PRN Blood Glucose LESS THAN 70 milliGRAM(s)/deciliter  docusate sodium 200 milliGRAM(s) Oral two times a day PRN Constipation  fluticasone propionate 50 MICROgram(s)/spray Nasal Spray 1 Spray(s) Both Nostrils once PRN nasal congestion  glucagon  Injectable 1 milliGRAM(s) IntraMuscular once PRN Glucose LESS THAN 70 milligrams/deciliter  polyethylene glycol 3350 17 Gram(s) Oral two times a day PRN Constipation  sodium chloride 0.65% Nasal 1 Spray(s) Both Nostrils two times a day PRN Nasal Congestion      LABS:                        11.4   10.8  )-----------( 248      ( 31 Aug 2019 07:43 )             37.0     Hgb Trend: 11.4<--, 11.3<--, 11.3<--, 11.3<--, 11.7<--  08-31    141  |  101  |  23  ----------------------------<  185<H>  4.0   |  28  |  0.70    Ca    9.2      31 Aug 2019 07:43  Phos  3.9     08-31  Mg     1.9     08-31      Creatinine Trend: 0.70<--, 0.70<--, 0.62<--, 0.68<--, 0.66<--, 0.76<--            MICROBIOLOGY: Culture - Sputum . (08.27.19 @ 13:53)    Gram Stain:   Few Squamous epithelial cells per low power field  Few polymorphonuclear leukocytes per low power field  Few Gram Positive Cocci in Pairs and Chains per oil power field  Rare Gram Variable Rods per oil power field    Specimen Source: .Sputum CUP    Culture Results:   Normal Respiratory Jessica present        RADIOLOGY:  [ x] Reviewed and interpreted by me  CXR 8/31 - unchanged [facility] - Division of Pulmonary, Critical Care and Sleep Medicine   Please call [phone] between 8am-pm weekdays, [phone] after hours and weekends    Interval Events: No events overnight, less SOB with exertion, still with productive cough, afebrile.    REVIEW OF SYSTEMS:  CV: [- ] chest pain   Resp: [+ ] cough [+ ] shortness of breath   [x ] All other systems negative  [ ] Unable to assess ROS because ________    OBJECTIVE:  ICU Vital Signs Last 24 Hrs  T(C): 37 (31 Aug 2019 10:02), Max: 37 (31 Aug 2019 10:02)  T(F): 98.6 (31 Aug 2019 10:02), Max: 98.6 (31 Aug 2019 10:02)  HR: 78 (31 Aug 2019 11:54) (62 - 92)  BP: 140/75 (31 Aug 2019 10:02) (105/52 - 140/75)  BP(mean): --  ABP: --  ABP(mean): --  RR: 18 (31 Aug 2019 10:02) (18 - 18)  SpO2: 98% (31 Aug 2019 11:54) (94% - 98%)        08-30 @ 07:01  -  08-31 @ 07:00  --------------------------------------------------------  IN: 950 mL / OUT: 700 mL / NET: 250 mL      CAPILLARY BLOOD GLUCOSE      POCT Blood Glucose.: 214 mg/dL (31 Aug 2019 09:34)      PHYSICAL EXAM:  General: NAD  HEENT: NC/AT  Lymph Nodes: no cervical or supraclavicular lymphadenopathy  Neck: supple  Respiratory:  prolonged exp phase, clear  Cardiovascular:  RRR, no m/r/g  Abdomen: soft, NT/ND, +BS  Extremities: no clubbing, cyanosis or edema, warm  Skin: no rash  Neurological: AAOx3, non focal exam  Psychiatry: not anxious appearing, normal affect and mood    HOSPITAL MEDICATIONS:  MEDICATIONS  (STANDING):  ALBUTerol/ipratropium for Nebulization 3 milliLiter(s) Nebulizer every 6 hours  apixaban 5 milliGRAM(s) Oral every 12 hours  atorvastatin 40 milliGRAM(s) Oral at bedtime  buDESOnide    Inhalation Suspension 0.5 milliGRAM(s) Inhalation daily  chlorhexidine 2% Cloths 1 Application(s) Topical daily  dextrose 5%. 1000 milliLiter(s) (50 mL/Hr) IV Continuous <Continuous>  dextrose 50% Injectable 12.5 Gram(s) IV Push once  dextrose 50% Injectable 25 Gram(s) IV Push once  dextrose 50% Injectable 25 Gram(s) IV Push once  digoxin     Tablet 0.25 milliGRAM(s) Oral daily  hydrocortisone 1% Cream 1 Application(s) Topical two times a day  insulin glargine Injectable (LANTUS) 10 Unit(s) SubCutaneous every morning  insulin lispro (HumaLOG) corrective regimen sliding scale   SubCutaneous three times a day before meals  insulin lispro (HumaLOG) corrective regimen sliding scale   SubCutaneous at bedtime  levoFLOXacin  Tablet 500 milliGRAM(s) Oral daily  methylPREDNISolone sodium succinate Injectable 20 milliGRAM(s) IV Push every 12 hours  montelukast 10 milliGRAM(s) Oral daily  neomycin/BACItracin/polymyxin Topical Ointment 1 Application(s) Topical daily  pantoprazole    Tablet 40 milliGRAM(s) Oral before breakfast  tiotropium 18 MICROgram(s) Capsule 1 Capsule(s) Inhalation daily    MEDICATIONS  (PRN):  acetaminophen   Tablet .. 650 milliGRAM(s) Oral every 6 hours PRN Mild Pain (1 - 3), Moderate Pain (4 - 6)  benzocaine 15 mG/menthol 3.6 mG Lozenge 1 Lozenge Oral five times a day PRN Mouth Sores  dextrose 40% Gel 15 Gram(s) Oral once PRN Blood Glucose LESS THAN 70 milliGRAM(s)/deciliter  docusate sodium 200 milliGRAM(s) Oral two times a day PRN Constipation  fluticasone propionate 50 MICROgram(s)/spray Nasal Spray 1 Spray(s) Both Nostrils once PRN nasal congestion  glucagon  Injectable 1 milliGRAM(s) IntraMuscular once PRN Glucose LESS THAN 70 milligrams/deciliter  polyethylene glycol 3350 17 Gram(s) Oral two times a day PRN Constipation  sodium chloride 0.65% Nasal 1 Spray(s) Both Nostrils two times a day PRN Nasal Congestion      LABS:                        11.4   10.8  )-----------( 248      ( 31 Aug 2019 07:43 )             37.0     Hgb Trend: 11.4<--, 11.3<--, 11.3<--, 11.3<--, 11.7<--  08-31    141  |  101  |  23  ----------------------------<  185<H>  4.0   |  28  |  0.70    Ca    9.2      31 Aug 2019 07:43  Phos  3.9     08-31  Mg     1.9     08-31      Creatinine Trend: 0.70<--, 0.70<--, 0.62<--, 0.68<--, 0.66<--, 0.76<--            MICROBIOLOGY: Culture - Sputum . (08.27.19 @ 13:53)    Gram Stain:   Few Squamous epithelial cells per low power field  Few polymorphonuclear leukocytes per low power field  Few Gram Positive Cocci in Pairs and Chains per oil power field  Rare Gram Variable Rods per oil power field    Specimen Source: .Sputum CUP    Culture Results:   Normal Respiratory Jessica present        RADIOLOGY:  [ x] Reviewed and interpreted by me  CXR 8/31 - unchanged

## 2019-09-01 LAB
ANION GAP SERPL CALC-SCNC: 13 MMOL/L — SIGNIFICANT CHANGE UP (ref 5–17)
BUN SERPL-MCNC: 20 MG/DL — SIGNIFICANT CHANGE UP (ref 7–23)
CALCIUM SERPL-MCNC: 9.3 MG/DL — SIGNIFICANT CHANGE UP (ref 8.4–10.5)
CHLORIDE SERPL-SCNC: 99 MMOL/L — SIGNIFICANT CHANGE UP (ref 96–108)
CO2 SERPL-SCNC: 27 MMOL/L — SIGNIFICANT CHANGE UP (ref 22–31)
CREAT SERPL-MCNC: 0.64 MG/DL — SIGNIFICANT CHANGE UP (ref 0.5–1.3)
GLUCOSE BLDC GLUCOMTR-MCNC: 193 MG/DL — HIGH (ref 70–99)
GLUCOSE BLDC GLUCOMTR-MCNC: 221 MG/DL — HIGH (ref 70–99)
GLUCOSE BLDC GLUCOMTR-MCNC: 297 MG/DL — HIGH (ref 70–99)
GLUCOSE BLDC GLUCOMTR-MCNC: 300 MG/DL — HIGH (ref 70–99)
GLUCOSE SERPL-MCNC: 199 MG/DL — HIGH (ref 70–99)
GRAM STN FLD: SIGNIFICANT CHANGE UP
HCT VFR BLD CALC: 39 % — SIGNIFICANT CHANGE UP (ref 34.5–45)
HGB BLD-MCNC: 12 G/DL — SIGNIFICANT CHANGE UP (ref 11.5–15.5)
MAGNESIUM SERPL-MCNC: 2 MG/DL — SIGNIFICANT CHANGE UP (ref 1.6–2.6)
MCHC RBC-ENTMCNC: 23.5 PG — LOW (ref 27–34)
MCHC RBC-ENTMCNC: 30.7 GM/DL — LOW (ref 32–36)
MCV RBC AUTO: 76.6 FL — LOW (ref 80–100)
PHOSPHATE SERPL-MCNC: 3.8 MG/DL — SIGNIFICANT CHANGE UP (ref 2.5–4.5)
PLATELET # BLD AUTO: 274 K/UL — SIGNIFICANT CHANGE UP (ref 150–400)
POTASSIUM SERPL-MCNC: 4.1 MMOL/L — SIGNIFICANT CHANGE UP (ref 3.5–5.3)
POTASSIUM SERPL-SCNC: 4.1 MMOL/L — SIGNIFICANT CHANGE UP (ref 3.5–5.3)
RBC # BLD: 5.09 M/UL — SIGNIFICANT CHANGE UP (ref 3.8–5.2)
RBC # FLD: 14.8 % — HIGH (ref 10.3–14.5)
SODIUM SERPL-SCNC: 139 MMOL/L — SIGNIFICANT CHANGE UP (ref 135–145)
SPECIMEN SOURCE: SIGNIFICANT CHANGE UP
WBC # BLD: 11.8 K/UL — HIGH (ref 3.8–10.5)
WBC # FLD AUTO: 11.8 K/UL — HIGH (ref 3.8–10.5)

## 2019-09-01 PROCEDURE — 99232 SBSQ HOSP IP/OBS MODERATE 35: CPT | Mod: GC

## 2019-09-01 PROCEDURE — 99233 SBSQ HOSP IP/OBS HIGH 50: CPT

## 2019-09-01 RX ADMIN — Medication 0.25 MILLIGRAM(S): at 10:31

## 2019-09-01 RX ADMIN — Medication 0.5 MILLIGRAM(S): at 11:53

## 2019-09-01 RX ADMIN — Medication 1 APPLICATION(S): at 17:35

## 2019-09-01 RX ADMIN — Medication 200 MILLIGRAM(S): at 09:44

## 2019-09-01 RX ADMIN — Medication 3 MILLILITER(S): at 11:54

## 2019-09-01 RX ADMIN — Medication 4: at 17:34

## 2019-09-01 RX ADMIN — ATORVASTATIN CALCIUM 40 MILLIGRAM(S): 80 TABLET, FILM COATED ORAL at 21:57

## 2019-09-01 RX ADMIN — Medication 20 MILLIGRAM(S): at 17:35

## 2019-09-01 RX ADMIN — INSULIN GLARGINE 10 UNIT(S): 100 INJECTION, SOLUTION SUBCUTANEOUS at 09:43

## 2019-09-01 RX ADMIN — PANTOPRAZOLE SODIUM 40 MILLIGRAM(S): 20 TABLET, DELAYED RELEASE ORAL at 06:19

## 2019-09-01 RX ADMIN — MONTELUKAST 10 MILLIGRAM(S): 4 TABLET, CHEWABLE ORAL at 13:07

## 2019-09-01 RX ADMIN — TIOTROPIUM BROMIDE 1 CAPSULE(S): 18 CAPSULE ORAL; RESPIRATORY (INHALATION) at 11:51

## 2019-09-01 RX ADMIN — Medication 3 MILLILITER(S): at 18:48

## 2019-09-01 RX ADMIN — CHLORHEXIDINE GLUCONATE 1 APPLICATION(S): 213 SOLUTION TOPICAL at 13:05

## 2019-09-01 RX ADMIN — APIXABAN 5 MILLIGRAM(S): 2.5 TABLET, FILM COATED ORAL at 06:19

## 2019-09-01 RX ADMIN — POLYETHYLENE GLYCOL 3350 17 GRAM(S): 17 POWDER, FOR SOLUTION ORAL at 09:44

## 2019-09-01 RX ADMIN — Medication 2: at 09:43

## 2019-09-01 RX ADMIN — Medication 3 MILLILITER(S): at 23:54

## 2019-09-01 RX ADMIN — Medication 20 MILLIGRAM(S): at 06:19

## 2019-09-01 RX ADMIN — POLYETHYLENE GLYCOL 3350 17 GRAM(S): 17 POWDER, FOR SOLUTION ORAL at 22:11

## 2019-09-01 RX ADMIN — BACITRACIN ZINC NEOMYCIN SULFATE POLYMYXIN B SULFATE 1 APPLICATION(S): 400; 3.5; 5 OINTMENT TOPICAL at 13:08

## 2019-09-01 RX ADMIN — Medication 2: at 21:58

## 2019-09-01 RX ADMIN — Medication 6: at 13:06

## 2019-09-01 RX ADMIN — Medication 3 MILLILITER(S): at 06:07

## 2019-09-01 RX ADMIN — Medication 200 MILLIGRAM(S): at 21:58

## 2019-09-01 RX ADMIN — APIXABAN 5 MILLIGRAM(S): 2.5 TABLET, FILM COATED ORAL at 17:34

## 2019-09-01 NOTE — PROGRESS NOTE ADULT - PROBLEM SELECTOR PLAN 1
Pt with hx of severe persistent asthma, 10+ intubations (last one 5yrs ago), presents with SOB and dyspnea likely asthma exacerbation 2/2 to viral infection  - RVP + entero/rhino virus  - procalcitonin wnl   - provide supportive care  -c/w singulair, budesonide, spiriva and duonebs Q6H  - saturating well on RA  -follows with Dr. Allison for pulmonology  - c/w with IV steroids change to PO as tolerated  - hold azithromycin 250mg 3x/week while on levoquin  - started on Levaquin 500mg daily x 5 days for possible superimposed bacterial bronchitis   - c/w chest PT/Acapella/incentive spirometer  - CXR 8/30: clear lungs, unchanged from previous cxr on 8/26  - pulmonology following, appreciated recs

## 2019-09-01 NOTE — PROGRESS NOTE ADULT - ASSESSMENT
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  Has been undergoing extensive work up over the past month for progressive KRAMER including recent TTE, V/Q scan and CT chest. Notable findings for right sided peripheral lung nodules, unchanged from prior CTs. Echo with mod-severe AR and moderately dilated LA, diastolic dysfunction, normal RSVP.   Most recent PFTs with evidence of severe obstructive lung disease.   Last admitted 2/2019 acute hypoxic resp failure 2/2 coronovirus respiratory infection.   Has received antibiotics as an outpatient, most recently in July. Also most recent PET/CT 7/3/19 which shows mildly FDG-avid peripheral nodular opacity RML and anterior RLL nodule - unchanged size from prior.     Becomes dyspneic with minimal exertion.    Presented overnight on 8/26 with 1 day of productive cough, low grade fever of 100 F (while on chronic steroids) and chest tightness, feeling as if her "chest was caving in". In the ED received Solumedrol 40 IV and Duonebs x3. CXR does not show any acute pathology. 98% on RA and is speaking in full sentences.   Denies sick contacts.   RVP +entero/rhinovirus.       A/P: Extensive history as per above with chronic KRAMER, worsening and associated with productive cough and chest tightness.  1. RVP positive - asthma exacerbation 2/2 viral bronchitis  c/w bronchodilators, Budesonide nebs BID, Spiriva  Solumedrol 20 mg IV BID- can transition to Prednisone to 40 mg as get closer to discharge and taper down to home dose slowly   Airway clearance therapy - acapella device 4x a day, incentive spirometer  Supplemental O2 as needed, goal sats 92-96%  As she continues to experience minimal improvement with IV steroids - c/w  Azithromycin 250 mg 3x a week for anti-inflammatory effects  Superimposed bacterial bronchitis - day 3/5 of Levaquin 500 mg QD. F/u repeat sputum cultures    2. Dyspnea -   Likely acute exacerbation of her obstructive lung disease  Cardiology service consulted and do not believe there is a cardiac component to her dyspnea given stable findings on TTE and no evidence of pHTN  -c/w home med regimen    3. Lung nodules - not likely the cause of her symptoms of dyspnea  unclear etiology, mildly PET-FDG avid and unchanged in size despite antibiotics since Feb 2019 after her acute resp infection. Consider biopsy - transthoracic approach may be best, will d/w Thoracic radiology but this work up should be done as an outpatient.     4. DVT ppx    Will follow up. 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  Has been undergoing extensive work up over the past month for progressive KRAMER including recent TTE, V/Q scan and CT chest. Notable findings for right sided peripheral lung nodules, unchanged from prior CTs. Echo with mod-severe AR and moderately dilated LA, diastolic dysfunction, normal RSVP.   Most recent PFTs with evidence of severe obstructive lung disease.   Last admitted 2/2019 acute hypoxic resp failure 2/2 coronovirus respiratory infection.   Has received antibiotics as an outpatient, most recently in July. Also most recent PET/CT 7/3/19 which shows mildly FDG-avid peripheral nodular opacity RML and anterior RLL nodule - unchanged size from prior.     Becomes dyspneic with minimal exertion.    Presented overnight on 8/26 with 1 day of productive cough, low grade fever of 100 F (while on chronic steroids) and chest tightness, feeling as if her "chest was caving in". In the ED received Solumedrol 40 IV and Duonebs x3. CXR does not show any acute pathology. 98% on RA and is speaking in full sentences.   Denies sick contacts.   RVP +entero/rhinovirus.       A/P: Extensive history as per above with chronic KRAMER, worsening and associated with productive cough and chest tightness.  1. RVP positive - asthma exacerbation 2/2 viral bronchitis  c/w bronchodilators, Budesonide nebs BID, Spiriva  Solumedrol 20 mg IV BID- can transition to Prednisone to 40 mg as get closer to discharge and taper down to home dose slowly   Airway clearance therapy - acapella device 4x a day, incentive spirometer  Supplemental O2 as needed, goal sats 92-96%  As she continues to experience minimal improvement with IV steroids - c/w  Azithromycin 250 mg 3x a week for anti-inflammatory effects  Superimposed bacterial bronchitis - day 3/5 of Levaquin 500 mg QD. F/u repeat sputum cultures pending    2. Dyspnea - Acute exacerbation of her severe asthma   Cardiology service consulted and do not believe there is a cardiac component to her dyspnea given stable findings on TTE and no evidence of pHTN  -c/w home med regimen    3. Lung nodules - not likely the cause of her symptoms of dyspnea  unclear etiology, mildly PET-FDG avid and unchanged in size despite antibiotics since Feb 2019 after her acute resp infection. Consider biopsy - transthoracic approach may be best, will d/w Thoracic radiology but this work up should be done as an outpatient.     4. DVT ppx    Will follow up.

## 2019-09-01 NOTE — PROGRESS NOTE ADULT - SUBJECTIVE AND OBJECTIVE BOX
[facility] - Division of Pulmonary, Critical Care and Sleep Medicine   Please call [phone] between 8am-pm weekdays, [phone] after hours and weekends    Interval Events:    REVIEW OF SYSTEMS:  CV: [ ] chest pain   Resp: [ ] cough [ ] shortness of breath   [ ] All other systems negative  [ ] Unable to assess ROS because ________    OBJECTIVE:  ICU Vital Signs Last 24 Hrs  T(C): 36.4 (01 Sep 2019 05:58), Max: 37 (31 Aug 2019 10:02)  T(F): 97.6 (01 Sep 2019 05:58), Max: 98.6 (31 Aug 2019 10:02)  HR: 72 (01 Sep 2019 06:08) (68 - 80)  BP: 115/54 (01 Sep 2019 05:58) (115/54 - 140/75)  BP(mean): --  ABP: --  ABP(mean): --  RR: 18 (01 Sep 2019 05:58) (18 - 18)  SpO2: 98% (01 Sep 2019 06:08) (97% - 99%)        08-31 @ 07:01  -  09-01 @ 07:00  --------------------------------------------------------  IN: 600 mL / OUT: 2350 mL / NET: -1750 mL      CAPILLARY BLOOD GLUCOSE      POCT Blood Glucose.: 209 mg/dL (31 Aug 2019 21:14)      PHYSICAL EXAM:  General: NAD  HEENT: NC/AT  Lymph Nodes: no cervical or supraclavicular lymphadenopathy  Neck: supple  Respiratory:  CTA b/l, no wheezes, crackles or rhonchi  Cardiovascular:  RRR, no m/r/g  Abdomen: soft, NT/ND, +BS  Extremities: no clubbing, cyanosis or edema, warm  Skin: no rash  Neurological: AAOx3, non focal exam  Psychiatry: not anxious appearing, normal affect and mood    HOSPITAL MEDICATIONS:  MEDICATIONS  (STANDING):  ALBUTerol/ipratropium for Nebulization 3 milliLiter(s) Nebulizer every 6 hours  apixaban 5 milliGRAM(s) Oral every 12 hours  atorvastatin 40 milliGRAM(s) Oral at bedtime  buDESOnide    Inhalation Suspension 0.5 milliGRAM(s) Inhalation daily  chlorhexidine 2% Cloths 1 Application(s) Topical daily  dextrose 5%. 1000 milliLiter(s) (50 mL/Hr) IV Continuous <Continuous>  dextrose 50% Injectable 12.5 Gram(s) IV Push once  dextrose 50% Injectable 25 Gram(s) IV Push once  dextrose 50% Injectable 25 Gram(s) IV Push once  digoxin     Tablet 0.25 milliGRAM(s) Oral daily  hydrocortisone 1% Cream 1 Application(s) Topical two times a day  insulin glargine Injectable (LANTUS) 10 Unit(s) SubCutaneous every morning  insulin lispro (HumaLOG) corrective regimen sliding scale   SubCutaneous three times a day before meals  insulin lispro (HumaLOG) corrective regimen sliding scale   SubCutaneous at bedtime  levoFLOXacin  Tablet 500 milliGRAM(s) Oral daily  methylPREDNISolone sodium succinate Injectable 20 milliGRAM(s) IV Push every 12 hours  montelukast 10 milliGRAM(s) Oral daily  neomycin/BACItracin/polymyxin Topical Ointment 1 Application(s) Topical daily  pantoprazole    Tablet 40 milliGRAM(s) Oral before breakfast  tiotropium 18 MICROgram(s) Capsule 1 Capsule(s) Inhalation daily    MEDICATIONS  (PRN):  acetaminophen   Tablet .. 650 milliGRAM(s) Oral every 6 hours PRN Mild Pain (1 - 3), Moderate Pain (4 - 6)  benzocaine 15 mG/menthol 3.6 mG Lozenge 1 Lozenge Oral five times a day PRN Mouth Sores  dextrose 40% Gel 15 Gram(s) Oral once PRN Blood Glucose LESS THAN 70 milliGRAM(s)/deciliter  docusate sodium 200 milliGRAM(s) Oral two times a day PRN Constipation  fluticasone propionate 50 MICROgram(s)/spray Nasal Spray 1 Spray(s) Both Nostrils once PRN nasal congestion  glucagon  Injectable 1 milliGRAM(s) IntraMuscular once PRN Glucose LESS THAN 70 milligrams/deciliter  polyethylene glycol 3350 17 Gram(s) Oral two times a day PRN Constipation  sodium chloride 0.65% Nasal 1 Spray(s) Both Nostrils two times a day PRN Nasal Congestion      LABS:                        11.4   10.8  )-----------( 248      ( 31 Aug 2019 07:43 )             37.0     Hgb Trend: 11.4<--, 11.3<--, 11.3<--, 11.3<--, 11.7<--  09-01    139  |  99  |  20  ----------------------------<  199<H>  4.1   |  27  |  0.64    Ca    9.3      01 Sep 2019 07:22  Phos  3.8     09-01  Mg     2.0     09-01      Creatinine Trend: 0.64<--, 0.70<--, 0.70<--, 0.62<--, 0.68<--, 0.66<--            MICROBIOLOGY:   Culture - Sputum . (08.31.19 @ 17:54)    Gram Stain:   Numerous polymorphonuclear leukocytes per low power field  Rare Squamous epithelial cells per low power field  Few Gram positive cocci in pairs seen per oil power field    Specimen Source: .Sputum      RADIOLOGY:  [x ] Reviewed and interpreted by me [facility] - Division of Pulmonary, Critical Care and Sleep Medicine   Please call [phone] between 8am-pm weekdays, [phone] after hours and weekends    Interval Events: No events overnight, able to ambulate to nurses station and tolerate. still with productive cough    REVIEW OF SYSTEMS:  CV: [- ] chest pain   Resp: [+ ] cough [- ] shortness of breath   [x ] All other systems negative  [ ] Unable to assess ROS because ________    OBJECTIVE:  ICU Vital Signs Last 24 Hrs  T(C): 36.4 (01 Sep 2019 05:58), Max: 37 (31 Aug 2019 10:02)  T(F): 97.6 (01 Sep 2019 05:58), Max: 98.6 (31 Aug 2019 10:02)  HR: 72 (01 Sep 2019 06:08) (68 - 80)  BP: 115/54 (01 Sep 2019 05:58) (115/54 - 140/75)  BP(mean): --  ABP: --  ABP(mean): --  RR: 18 (01 Sep 2019 05:58) (18 - 18)  SpO2: 98% (01 Sep 2019 06:08) (97% - 99%)        08-31 @ 07:01  -  09-01 @ 07:00  --------------------------------------------------------  IN: 600 mL / OUT: 2350 mL / NET: -1750 mL      CAPILLARY BLOOD GLUCOSE      POCT Blood Glucose.: 209 mg/dL (31 Aug 2019 21:14)      PHYSICAL EXAM:  General: NAD  HEENT: NC/AT  Neck: supple  Respiratory:  prolonged expiratory phase with scattered wheezes, no crackles or rhonchi  Cardiovascular:  RRR, no m/r/g  Abdomen: soft, NT/ND, +BS  Extremities: no clubbing, cyanosis or edema, warm  Skin: no rash  Neurological: AAOx3, non focal exam    HOSPITAL MEDICATIONS:  MEDICATIONS  (STANDING):  ALBUTerol/ipratropium for Nebulization 3 milliLiter(s) Nebulizer every 6 hours  apixaban 5 milliGRAM(s) Oral every 12 hours  atorvastatin 40 milliGRAM(s) Oral at bedtime  buDESOnide    Inhalation Suspension 0.5 milliGRAM(s) Inhalation daily  chlorhexidine 2% Cloths 1 Application(s) Topical daily  dextrose 5%. 1000 milliLiter(s) (50 mL/Hr) IV Continuous <Continuous>  dextrose 50% Injectable 12.5 Gram(s) IV Push once  dextrose 50% Injectable 25 Gram(s) IV Push once  dextrose 50% Injectable 25 Gram(s) IV Push once  digoxin     Tablet 0.25 milliGRAM(s) Oral daily  hydrocortisone 1% Cream 1 Application(s) Topical two times a day  insulin glargine Injectable (LANTUS) 10 Unit(s) SubCutaneous every morning  insulin lispro (HumaLOG) corrective regimen sliding scale   SubCutaneous three times a day before meals  insulin lispro (HumaLOG) corrective regimen sliding scale   SubCutaneous at bedtime  levoFLOXacin  Tablet 500 milliGRAM(s) Oral daily  methylPREDNISolone sodium succinate Injectable 20 milliGRAM(s) IV Push every 12 hours  montelukast 10 milliGRAM(s) Oral daily  neomycin/BACItracin/polymyxin Topical Ointment 1 Application(s) Topical daily  pantoprazole    Tablet 40 milliGRAM(s) Oral before breakfast  tiotropium 18 MICROgram(s) Capsule 1 Capsule(s) Inhalation daily    MEDICATIONS  (PRN):  acetaminophen   Tablet .. 650 milliGRAM(s) Oral every 6 hours PRN Mild Pain (1 - 3), Moderate Pain (4 - 6)  benzocaine 15 mG/menthol 3.6 mG Lozenge 1 Lozenge Oral five times a day PRN Mouth Sores  dextrose 40% Gel 15 Gram(s) Oral once PRN Blood Glucose LESS THAN 70 milliGRAM(s)/deciliter  docusate sodium 200 milliGRAM(s) Oral two times a day PRN Constipation  fluticasone propionate 50 MICROgram(s)/spray Nasal Spray 1 Spray(s) Both Nostrils once PRN nasal congestion  glucagon  Injectable 1 milliGRAM(s) IntraMuscular once PRN Glucose LESS THAN 70 milligrams/deciliter  polyethylene glycol 3350 17 Gram(s) Oral two times a day PRN Constipation  sodium chloride 0.65% Nasal 1 Spray(s) Both Nostrils two times a day PRN Nasal Congestion      LABS:                        11.4   10.8  )-----------( 248      ( 31 Aug 2019 07:43 )             37.0     Hgb Trend: 11.4<--, 11.3<--, 11.3<--, 11.3<--, 11.7<--  09-01    139  |  99  |  20  ----------------------------<  199<H>  4.1   |  27  |  0.64    Ca    9.3      01 Sep 2019 07:22  Phos  3.8     09-01  Mg     2.0     09-01      Creatinine Trend: 0.64<--, 0.70<--, 0.70<--, 0.62<--, 0.68<--, 0.66<--            MICROBIOLOGY:   Culture - Sputum . (08.31.19 @ 17:54)    Gram Stain:   Numerous polymorphonuclear leukocytes per low power field  Rare Squamous epithelial cells per low power field  Few Gram positive cocci in pairs seen per oil power field    Specimen Source: .Sputum      RADIOLOGY:  [x ] Reviewed and interpreted by me

## 2019-09-01 NOTE — PROGRESS NOTE ADULT - SUBJECTIVE AND OBJECTIVE BOX
Authored by Dr Sravan Corado [phone] [facility] / [id number] [facility]    Patient is a 68y old  Female who presents with a chief complaint of SOB (01 Sep 2019 09:08)    SUBJECTIVE / OVERNIGHT EVENTS: No acute events overnight  This morning pt reports improving SOB and changing sputum color (now more yellow/green)  Reports being able to walk to the end of the castillo before becoming short of breath  Denies CP, fever, chills, N/V, or abd pain. Endorses continued constipation     MEDICATIONS  (STANDING):  ALBUTerol/ipratropium for Nebulization 3 milliLiter(s) Nebulizer every 6 hours  apixaban 5 milliGRAM(s) Oral every 12 hours  atorvastatin 40 milliGRAM(s) Oral at bedtime  buDESOnide    Inhalation Suspension 0.5 milliGRAM(s) Inhalation daily  chlorhexidine 2% Cloths 1 Application(s) Topical daily  dextrose 5%. 1000 milliLiter(s) (50 mL/Hr) IV Continuous <Continuous>  dextrose 50% Injectable 12.5 Gram(s) IV Push once  dextrose 50% Injectable 25 Gram(s) IV Push once  dextrose 50% Injectable 25 Gram(s) IV Push once  digoxin     Tablet 0.25 milliGRAM(s) Oral daily  hydrocortisone 1% Cream 1 Application(s) Topical two times a day  insulin glargine Injectable (LANTUS) 10 Unit(s) SubCutaneous every morning  insulin lispro (HumaLOG) corrective regimen sliding scale   SubCutaneous three times a day before meals  insulin lispro (HumaLOG) corrective regimen sliding scale   SubCutaneous at bedtime  levoFLOXacin  Tablet 500 milliGRAM(s) Oral daily  methylPREDNISolone sodium succinate Injectable 20 milliGRAM(s) IV Push every 12 hours  montelukast 10 milliGRAM(s) Oral daily  neomycin/BACItracin/polymyxin Topical Ointment 1 Application(s) Topical daily  pantoprazole    Tablet 40 milliGRAM(s) Oral before breakfast  tiotropium 18 MICROgram(s) Capsule 1 Capsule(s) Inhalation daily    MEDICATIONS  (PRN):  acetaminophen   Tablet .. 650 milliGRAM(s) Oral every 6 hours PRN Mild Pain (1 - 3), Moderate Pain (4 - 6)  benzocaine 15 mG/menthol 3.6 mG Lozenge 1 Lozenge Oral five times a day PRN Mouth Sores  dextrose 40% Gel 15 Gram(s) Oral once PRN Blood Glucose LESS THAN 70 milliGRAM(s)/deciliter  docusate sodium 200 milliGRAM(s) Oral two times a day PRN Constipation  fluticasone propionate 50 MICROgram(s)/spray Nasal Spray 1 Spray(s) Both Nostrils once PRN nasal congestion  glucagon  Injectable 1 milliGRAM(s) IntraMuscular once PRN Glucose LESS THAN 70 milligrams/deciliter  polyethylene glycol 3350 17 Gram(s) Oral two times a day PRN Constipation  sodium chloride 0.65% Nasal 1 Spray(s) Both Nostrils two times a day PRN Nasal Congestion      Vital Signs Last 24 Hrs  T(C): 36.4 (01 Sep 2019 05:58), Max: 36.8 (31 Aug 2019 22:09)  T(F): 97.6 (01 Sep 2019 05:58), Max: 98.2 (31 Aug 2019 22:09)  HR: 72 (01 Sep 2019 06:08) (68 - 80)  BP: 115/54 (01 Sep 2019 05:58) (115/54 - 130/62)  BP(mean): --  RR: 18 (01 Sep 2019 05:58) (18 - 18)  SpO2: 98% (01 Sep 2019 06:08) (97% - 99%)  CAPILLARY BLOOD GLUCOSE      POCT Blood Glucose.: 193 mg/dL (01 Sep 2019 09:11)  POCT Blood Glucose.: 209 mg/dL (31 Aug 2019 21:14)  POCT Blood Glucose.: 207 mg/dL (31 Aug 2019 17:48)  POCT Blood Glucose.: 254 mg/dL (31 Aug 2019 13:25)    I&O's Summary    31 Aug 2019 07:01  -  01 Sep 2019 07:00  --------------------------------------------------------  IN: 600 mL / OUT: 2350 mL / NET: -1750 mL        PHYSICAL EXAM  GENERAL: middle age women, wearing hospital gown, well groomed, sitting comfortably in bed in NAD    HEENT: right eye PERRL, MMM, +erythema in posterior pharynx. has a left eye prosthetic,   Pulm: normal work of breathing, improved wheezing, no rales or crackles were appreciated  CV: RRR, S1&S2+, no m/r/g appreciated, distant, +chemoport in right anterior chest wall (skin dry and intact)  ABDOMEN: soft, nt, nd, no hepatosplenomegaly. +ostomy   MSK: ROM wnl in upper and lower extremities, no gross abnormalities noted  EXTREMITIES:  no edama, clubbing, or cyanosis  Neuro: A&Ox3, fluent speech, strength 5/5 in the upper and lower extremities, sensation intact in the upper and lower extremities.   SKIN: warm and dry. +vesicular, pustule rash, no well defined border in the R upper and lower quadrant regions. Unilateral, does not cross the midline in the front and does not spread to the back       LABS:                        12.0   11.8  )-----------( 274      ( 01 Sep 2019 07:22 )             39.0     09-01    139  |  99  |  20  ----------------------------<  199<H>  4.1   |  27  |  0.64    Ca    9.3      01 Sep 2019 07:22  Phos  3.8     09-01  Mg     2.0     09-01                Culture - Sputum (collected 31 Aug 2019 17:54)  Source: .Sputum  Gram Stain (01 Sep 2019 01:22):    Numerous polymorphonuclear leukocytes per low power field    Rare Squamous epithelial cells per low power field    Few Gram positive cocci in pairs seen per oil power field        RADIOLOGY & ADDITIONAL TESTS:    Imaging Personally Reviewed:  Consultant(s) Notes Reviewed:  Pulm  Care Discussed with Consultants/Other Providers: Pulm

## 2019-09-02 LAB
ANION GAP SERPL CALC-SCNC: 12 MMOL/L — SIGNIFICANT CHANGE UP (ref 5–17)
BUN SERPL-MCNC: 20 MG/DL — SIGNIFICANT CHANGE UP (ref 7–23)
CALCIUM SERPL-MCNC: 9.3 MG/DL — SIGNIFICANT CHANGE UP (ref 8.4–10.5)
CHLORIDE SERPL-SCNC: 100 MMOL/L — SIGNIFICANT CHANGE UP (ref 96–108)
CO2 SERPL-SCNC: 28 MMOL/L — SIGNIFICANT CHANGE UP (ref 22–31)
CREAT SERPL-MCNC: 0.68 MG/DL — SIGNIFICANT CHANGE UP (ref 0.5–1.3)
CULTURE RESULTS: SIGNIFICANT CHANGE UP
GLUCOSE BLDC GLUCOMTR-MCNC: 206 MG/DL — HIGH (ref 70–99)
GLUCOSE BLDC GLUCOMTR-MCNC: 232 MG/DL — HIGH (ref 70–99)
GLUCOSE BLDC GLUCOMTR-MCNC: 292 MG/DL — HIGH (ref 70–99)
GLUCOSE BLDC GLUCOMTR-MCNC: 296 MG/DL — HIGH (ref 70–99)
GLUCOSE SERPL-MCNC: 173 MG/DL — HIGH (ref 70–99)
HCT VFR BLD CALC: 38 % — SIGNIFICANT CHANGE UP (ref 34.5–45)
HGB BLD-MCNC: 12.2 G/DL — SIGNIFICANT CHANGE UP (ref 11.5–15.5)
MAGNESIUM SERPL-MCNC: 2 MG/DL — SIGNIFICANT CHANGE UP (ref 1.6–2.6)
MCHC RBC-ENTMCNC: 24.1 PG — LOW (ref 27–34)
MCHC RBC-ENTMCNC: 32.1 GM/DL — SIGNIFICANT CHANGE UP (ref 32–36)
MCV RBC AUTO: 75 FL — LOW (ref 80–100)
PHOSPHATE SERPL-MCNC: 4.5 MG/DL — SIGNIFICANT CHANGE UP (ref 2.5–4.5)
PLATELET # BLD AUTO: 285 K/UL — SIGNIFICANT CHANGE UP (ref 150–400)
POTASSIUM SERPL-MCNC: 4.1 MMOL/L — SIGNIFICANT CHANGE UP (ref 3.5–5.3)
POTASSIUM SERPL-SCNC: 4.1 MMOL/L — SIGNIFICANT CHANGE UP (ref 3.5–5.3)
RBC # BLD: 5.07 M/UL — SIGNIFICANT CHANGE UP (ref 3.8–5.2)
RBC # FLD: 14.8 % — HIGH (ref 10.3–14.5)
SODIUM SERPL-SCNC: 140 MMOL/L — SIGNIFICANT CHANGE UP (ref 135–145)
SPECIMEN SOURCE: SIGNIFICANT CHANGE UP
WBC # BLD: 12.5 K/UL — HIGH (ref 3.8–10.5)
WBC # FLD AUTO: 12.5 K/UL — HIGH (ref 3.8–10.5)

## 2019-09-02 PROCEDURE — 99233 SBSQ HOSP IP/OBS HIGH 50: CPT

## 2019-09-02 PROCEDURE — 99232 SBSQ HOSP IP/OBS MODERATE 35: CPT | Mod: GC

## 2019-09-02 RX ORDER — SENNA PLUS 8.6 MG/1
2 TABLET ORAL ONCE
Refills: 0 | Status: COMPLETED | OUTPATIENT
Start: 2019-09-02 | End: 2019-09-02

## 2019-09-02 RX ADMIN — Medication 2: at 22:19

## 2019-09-02 RX ADMIN — Medication 3 MILLILITER(S): at 18:01

## 2019-09-02 RX ADMIN — Medication 20 MILLIGRAM(S): at 17:19

## 2019-09-02 RX ADMIN — Medication 0.25 MILLIGRAM(S): at 06:15

## 2019-09-02 RX ADMIN — BACITRACIN ZINC NEOMYCIN SULFATE POLYMYXIN B SULFATE 1 APPLICATION(S): 400; 3.5; 5 OINTMENT TOPICAL at 12:12

## 2019-09-02 RX ADMIN — Medication 3 MILLILITER(S): at 23:22

## 2019-09-02 RX ADMIN — Medication 4: at 17:19

## 2019-09-02 RX ADMIN — Medication 3 MILLILITER(S): at 11:28

## 2019-09-02 RX ADMIN — INSULIN GLARGINE 10 UNIT(S): 100 INJECTION, SOLUTION SUBCUTANEOUS at 09:20

## 2019-09-02 RX ADMIN — Medication 200 MILLIGRAM(S): at 22:21

## 2019-09-02 RX ADMIN — CHLORHEXIDINE GLUCONATE 1 APPLICATION(S): 213 SOLUTION TOPICAL at 12:11

## 2019-09-02 RX ADMIN — Medication 20 MILLIGRAM(S): at 06:14

## 2019-09-02 RX ADMIN — APIXABAN 5 MILLIGRAM(S): 2.5 TABLET, FILM COATED ORAL at 17:22

## 2019-09-02 RX ADMIN — Medication 200 MILLIGRAM(S): at 06:15

## 2019-09-02 RX ADMIN — ATORVASTATIN CALCIUM 40 MILLIGRAM(S): 80 TABLET, FILM COATED ORAL at 22:20

## 2019-09-02 RX ADMIN — Medication 6: at 12:19

## 2019-09-02 RX ADMIN — Medication 1 APPLICATION(S): at 06:14

## 2019-09-02 RX ADMIN — PANTOPRAZOLE SODIUM 40 MILLIGRAM(S): 20 TABLET, DELAYED RELEASE ORAL at 06:15

## 2019-09-02 RX ADMIN — POLYETHYLENE GLYCOL 3350 17 GRAM(S): 17 POWDER, FOR SOLUTION ORAL at 22:21

## 2019-09-02 RX ADMIN — Medication 0.5 MILLIGRAM(S): at 11:28

## 2019-09-02 RX ADMIN — APIXABAN 5 MILLIGRAM(S): 2.5 TABLET, FILM COATED ORAL at 06:15

## 2019-09-02 RX ADMIN — Medication 3 MILLILITER(S): at 05:55

## 2019-09-02 RX ADMIN — TIOTROPIUM BROMIDE 1 CAPSULE(S): 18 CAPSULE ORAL; RESPIRATORY (INHALATION) at 11:49

## 2019-09-02 RX ADMIN — Medication 4: at 09:21

## 2019-09-02 RX ADMIN — SENNA PLUS 2 TABLET(S): 8.6 TABLET ORAL at 01:02

## 2019-09-02 RX ADMIN — MONTELUKAST 10 MILLIGRAM(S): 4 TABLET, CHEWABLE ORAL at 22:19

## 2019-09-02 RX ADMIN — POLYETHYLENE GLYCOL 3350 17 GRAM(S): 17 POWDER, FOR SOLUTION ORAL at 06:16

## 2019-09-02 NOTE — PROGRESS NOTE ADULT - ASSESSMENT
The patient is a 69 y/o woman with asthma (on long term medrol) c/b adrenal insufficiency, afib on eliquis, DM2, TIA, colon cancer (s/p resection, chemo, RT), tracheobronchomalacia s/p bronchial thermoplasty who presented to the ED with SOB and dyspnea on exertion most likely due to asthma exacerbation 2/2 to viral infection. Started on Levaquin 500mg x 5 days. The patient is a 69 y/o woman with asthma (on long term medrol) c/b adrenal insufficiency, afib on eliquis, DM2, TIA, colon cancer (s/p resection, chemo, RT), tracheobronchomalacia s/p bronchial thermoplasty who presented to the ED with SOB and dyspnea on exertion most likely due to asthma exacerbation 2/2 to viral infection. Receiving Levaquin 500mg x 5 days.

## 2019-09-02 NOTE — PROGRESS NOTE ADULT - PROBLEM SELECTOR PLAN 1
Pt with hx of severe persistent asthma, 10+ intubations (last one 5yrs ago), presents with SOB and dyspnea likely asthma exacerbation 2/2 to viral infection  - RVP + entero/rhino virus  - procalcitonin wnl   - provide supportive care  -c/w singulair, budesonide, spiriva and duonebs Q6H  - saturating well on RA  -follows with Dr. Allison for pulmonology  - c/w with IV steroids change to PO as tolerated  - hold azithromycin 250mg 3x/week while on levoquin  - started on Levaquin 500mg daily x 5 days for possible superimposed bacterial bronchitis   - c/w chest PT/Acapella/incentive spirometer  - CXR 8/30: clear lungs, unchanged from previous cxr on 8/26  - pulmonology following, appreciated recs Pt with hx of severe persistent asthma, 10+ intubations (last one 5yrs ago), presents with SOB and dyspnea likely asthma exacerbation 2/2 to viral infection  - RVP + entero/rhino virus  - procalcitonin wnl   - provide supportive care  -c/w singulair, budesonide, spiriva and duonebs Q6H  - saturating well on RA  -follows with Dr. Allison for pulmonology  - continuing iv steroids with plan to switch to PO tomorrow, dispo is to discharge on prednisone taper  - hold azithromycin 250mg 3x/week while on levoquin  - started on Levaquin 500mg daily x 5 days for possible superimposed bacterial bronchitis   - c/w chest PT/Acapella/incentive spirometer  - CXR 8/30: clear lungs, unchanged from previous cxr on 8/26  - pulmonology following, appreciated recs

## 2019-09-02 NOTE — PROGRESS NOTE ADULT - ASSESSMENT
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  Has been undergoing extensive work up over the past month for progressive KRAMER including recent TTE, V/Q scan and CT chest. Notable findings for right sided peripheral lung nodules, unchanged from prior CTs. Echo with mod-severe AR and moderately dilated LA, diastolic dysfunction, normal RSVP.   Most recent PFTs with evidence of severe obstructive lung disease.   Last admitted 2/2019 acute hypoxic resp failure 2/2 coronovirus respiratory infection.   Has received antibiotics as an outpatient, most recently in July. Also most recent PET/CT 7/3/19 which shows mildly FDG-avid peripheral nodular opacity RML and anterior RLL nodule - unchanged size from prior.     Becomes dyspneic with minimal exertion.    Presented overnight on 8/26 with 1 day of productive cough, low grade fever of 100 F (while on chronic steroids) and chest tightness, feeling as if her "chest was caving in". In the ED received Solumedrol 40 IV and Duonebs x3. CXR does not show any acute pathology. 98% on RA and is speaking in full sentences.   Denies sick contacts.   RVP +entero/rhinovirus.       A/P: Extensive history as per above with chronic KRAMER, worsening and associated with productive cough and chest tightness.  1. RVP positive - asthma exacerbation 2/2 viral bronchitis  c/w bronchodilators, Budesonide nebs BID, Spiriva  Solumedrol 20 mg IV BID- can transition to Prednisone to 40 mg as get closer to discharge and taper down to home dose slowly   Airway clearance therapy - acapella device 4x a day, incentive spirometer  Supplemental O2 as needed, goal sats 92-96%  As she continues to experience minimal improvement with IV steroids - c/w  Azithromycin 250 mg 3x a week for anti-inflammatory effects  Superimposed bacterial bronchitis - day 4/5 of Levaquin 500 mg QD. Repeat sputum cultures negative    2. Dyspnea - Acute exacerbation of her severe asthma   Cardiology service consulted and do not believe there is a cardiac component to her dyspnea given stable findings on TTE and no evidence of pHTN  -c/w home med regimen    3. Lung nodules - not likely the cause of her symptoms of dyspnea  unclear etiology, mildly PET-FDG avid and unchanged in size despite antibiotics since Feb 2019 after her acute resp infection. Consider biopsy - transthoracic approach may be best, will d/w Thoracic radiology but this work up should be done as an outpatient.     4. DVT ppx    Will follow up.       Attending Attestation:   I was physically present for the key portions of the evaluation and management (E/M) service provided.  I agree with the above history, physical, and plan which I have reviewed and edited where appropriate. 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  Has been undergoing extensive work up over the past month for progressive KRAMER including recent TTE, V/Q scan and CT chest. Notable findings for right sided peripheral lung nodules, unchanged from prior CTs. Echo with mod-severe AR and moderately dilated LA, diastolic dysfunction, normal RSVP.   Most recent PFTs with evidence of severe obstructive lung disease.   Last admitted 2/2019 acute hypoxic resp failure 2/2 coronovirus respiratory infection.   Has received antibiotics as an outpatient, most recently in July. Also most recent PET/CT 7/3/19 which shows mildly FDG-avid peripheral nodular opacity RML and anterior RLL nodule - unchanged size from prior.     Becomes dyspneic with minimal exertion.    Presented overnight on 8/26 with 1 day of productive cough, low grade fever of 100 F (while on chronic steroids) and chest tightness, feeling as if her "chest was caving in". In the ED received Solumedrol 40 IV and Duonebs x3. CXR does not show any acute pathology. 98% on RA and is speaking in full sentences.   Denies sick contacts.   RVP +entero/rhinovirus.       A/P: Extensive history as per above with chronic KRAMER, worsening and associated with productive cough and chest tightness.  1. RVP positive - asthma exacerbation 2/2 viral bronchitis  c/w bronchodilators, Budesonide nebs BID, Spiriva  Solumedrol 20 mg IV BID- can transition to Prednisone to 40 mg as get closer to discharge and taper down to home dose slowly   Airway clearance therapy - acapella device 4x a day, incentive spirometer  Supplemental O2 as needed, goal sats 92-96%  As she continues to experience minimal improvement with IV steroids - c/w  Azithromycin 250 mg 3x a week for anti-inflammatory effects  Superimposed bacterial bronchitis - day 4/5 of Levaquin 500 mg QD. Repeat sputum cultures negative    2. Dyspnea - Acute exacerbation of her severe asthma   Cardiology service consulted and do not believe there is a cardiac component to her dyspnea given stable findings on TTE and no evidence of pHTN  -c/w home med regimen    3. Lung nodules - not likely the cause of her symptoms of dyspnea  unclear etiology, mildly PET-FDG avid and unchanged in size despite antibiotics since Feb 2019 after her acute resp infection. Consider biopsy - transthoracic approach may be best, will d/w Thoracic radiology but this work up should be done as an outpatient.     4. DVT ppx    Dispo planning home tomorrow?   Will follow up.       Attending Attestation:   I was physically present for the key portions of the evaluation and management (E/M) service provided.  I agree with the above history, physical, and plan which I have reviewed and edited where appropriate.

## 2019-09-02 NOTE — PROGRESS NOTE ADULT - SUBJECTIVE AND OBJECTIVE BOX
Authored by Dr Rick Dennis [phone] [facility] / [id number] [facility]    Patient is a 68y old Female who presents with a chief complaint of SOB (02 Sep 2019 09:08)    SUBJECTIVE / OVERNIGHT EVENTS: No acute events overnight  This morning pt reports improving SOB and changing sputum color (now more yellow/green)  Reports being able to walk to the end of the castillo before becoming short of breath  Denies CP, fever, chills, N/V, or abd pain. Endorses continued constipation     MEDICATIONS  (STANDING):  ALBUTerol/ipratropium for Nebulization 3 milliLiter(s) Nebulizer every 6 hours  apixaban 5 milliGRAM(s) Oral every 12 hours  atorvastatin 40 milliGRAM(s) Oral at bedtime  buDESOnide    Inhalation Suspension 0.5 milliGRAM(s) Inhalation daily  chlorhexidine 2% Cloths 1 Application(s) Topical daily  dextrose 5%. 1000 milliLiter(s) (50 mL/Hr) IV Continuous <Continuous>  dextrose 50% Injectable 12.5 Gram(s) IV Push once  dextrose 50% Injectable 25 Gram(s) IV Push once  dextrose 50% Injectable 25 Gram(s) IV Push once  digoxin     Tablet 0.25 milliGRAM(s) Oral daily  hydrocortisone 1% Cream 1 Application(s) Topical two times a day  insulin glargine Injectable (LANTUS) 10 Unit(s) SubCutaneous every morning  insulin lispro (HumaLOG) corrective regimen sliding scale   SubCutaneous three times a day before meals  insulin lispro (HumaLOG) corrective regimen sliding scale   SubCutaneous at bedtime  levoFLOXacin  Tablet 500 milliGRAM(s) Oral daily  methylPREDNISolone sodium succinate Injectable 20 milliGRAM(s) IV Push every 12 hours  montelukast 10 milliGRAM(s) Oral daily  neomycin/BACItracin/polymyxin Topical Ointment 1 Application(s) Topical daily  pantoprazole    Tablet 40 milliGRAM(s) Oral before breakfast  tiotropium 18 MICROgram(s) Capsule 1 Capsule(s) Inhalation daily    MEDICATIONS  (PRN):  acetaminophen   Tablet .. 650 milliGRAM(s) Oral every 6 hours PRN Mild Pain (1 - 3), Moderate Pain (4 - 6)  benzocaine 15 mG/menthol 3.6 mG Lozenge 1 Lozenge Oral five times a day PRN Mouth Sores  dextrose 40% Gel 15 Gram(s) Oral once PRN Blood Glucose LESS THAN 70 milliGRAM(s)/deciliter  docusate sodium 200 milliGRAM(s) Oral two times a day PRN Constipation  fluticasone propionate 50 MICROgram(s)/spray Nasal Spray 1 Spray(s) Both Nostrils once PRN nasal congestion  glucagon  Injectable 1 milliGRAM(s) IntraMuscular once PRN Glucose LESS THAN 70 milligrams/deciliter  polyethylene glycol 3350 17 Gram(s) Oral two times a day PRN Constipation  sodium chloride 0.65% Nasal 1 Spray(s) Both Nostrils two times a day PRN Nasal Congestion      Vital Signs Last 24 Hrs  T(C): 36.4 (01 Sep 2019 05:58), Max: 36.8 (31 Aug 2019 22:09)  T(F): 97.6 (01 Sep 2019 05:58), Max: 98.2 (31 Aug 2019 22:09)  HR: 72 (01 Sep 2019 06:08) (68 - 80)  BP: 115/54 (01 Sep 2019 05:58) (115/54 - 130/62)  BP(mean): --  RR: 18 (01 Sep 2019 05:58) (18 - 18)  SpO2: 98% (01 Sep 2019 06:08) (97% - 99%)  CAPILLARY BLOOD GLUCOSE      POCT Blood Glucose.: 193 mg/dL (01 Sep 2019 09:11)  POCT Blood Glucose.: 209 mg/dL (31 Aug 2019 21:14)  POCT Blood Glucose.: 207 mg/dL (31 Aug 2019 17:48)  POCT Blood Glucose.: 254 mg/dL (31 Aug 2019 13:25)    I&O's Summary    31 Aug 2019 07:01  -  01 Sep 2019 07:00  --------------------------------------------------------  IN: 600 mL / OUT: 2350 mL / NET: -1750 mL        PHYSICAL EXAM  GENERAL: middle age women, wearing hospital gown, well groomed, sitting comfortably in bed in NAD    HEENT: right eye PERRL, MMM, +erythema in posterior pharynx. has a left eye prosthetic,   Pulm: normal work of breathing, improved wheezing, no rales or crackles were appreciated  CV: RRR, S1&S2+, no m/r/g appreciated, distant, +chemoport in right anterior chest wall (skin dry and intact)  ABDOMEN: soft, nt, nd, no hepatosplenomegaly. +ostomy   MSK: ROM wnl in upper and lower extremities, no gross abnormalities noted  EXTREMITIES:  no edama, clubbing, or cyanosis  Neuro: A&Ox3, fluent speech, strength 5/5 in the upper and lower extremities, sensation intact in the upper and lower extremities.   SKIN: warm and dry. +vesicular, pustule rash, no well defined border in the R upper and lower quadrant regions. Unilateral, does not cross the midline in the front and does not spread to the back       LABS:                        12.0   11.8  )-----------( 274      ( 01 Sep 2019 07:22 )             39.0     09-01    139  |  99  |  20  ----------------------------<  199<H>  4.1   |  27  |  0.64    Ca    9.3      01 Sep 2019 07:22  Phos  3.8     09-01  Mg     2.0     09-01                Culture - Sputum (collected 31 Aug 2019 17:54)  Source: .Sputum  Gram Stain (01 Sep 2019 01:22):    Numerous polymorphonuclear leukocytes per low power field    Rare Squamous epithelial cells per low power field    Few Gram positive cocci in pairs seen per oil power field        RADIOLOGY & ADDITIONAL TESTS:    Imaging Personally Reviewed:  Consultant(s) Notes Reviewed:  Pulm  Care Discussed with Consultants/Other Providers: Pulm Authored by Dr Rick Dennis [phone] [facility] / [id number] [facility]    Patient is a 68y old Female who presents with a chief complaint of SOB (seen 9/2/2019 at 08:00)    SUBJECTIVE / OVERNIGHT EVENTS: No acute events overnight  This morning pt reported improving SOB from presentation with cough productive of green sputum.  Endorsed BM last 2 days ago.  No CP, fever, chills, N/V, or abd pain elicited.      MEDICATIONS  (STANDING):  ALBUTerol/ipratropium for Nebulization 3 milliLiter(s) Nebulizer every 6 hours  apixaban 5 milliGRAM(s) Oral every 12 hours  atorvastatin 40 milliGRAM(s) Oral at bedtime  buDESOnide    Inhalation Suspension 0.5 milliGRAM(s) Inhalation daily  chlorhexidine 2% Cloths 1 Application(s) Topical daily  dextrose 5%. 1000 milliLiter(s) (50 mL/Hr) IV Continuous <Continuous>  dextrose 50% Injectable 12.5 Gram(s) IV Push once  dextrose 50% Injectable 25 Gram(s) IV Push once  dextrose 50% Injectable 25 Gram(s) IV Push once  digoxin     Tablet 0.25 milliGRAM(s) Oral daily  hydrocortisone 1% Cream 1 Application(s) Topical two times a day  insulin glargine Injectable (LANTUS) 10 Unit(s) SubCutaneous every morning  insulin lispro (HumaLOG) corrective regimen sliding scale   SubCutaneous three times a day before meals  insulin lispro (HumaLOG) corrective regimen sliding scale   SubCutaneous at bedtime  levoFLOXacin  Tablet 500 milliGRAM(s) Oral daily  methylPREDNISolone sodium succinate Injectable 20 milliGRAM(s) IV Push once  montelukast 10 milliGRAM(s) Oral daily  neomycin/BACItracin/polymyxin Topical Ointment 1 Application(s) Topical daily  pantoprazole    Tablet 40 milliGRAM(s) Oral before breakfast  tiotropium 18 MICROgram(s) Capsule 1 Capsule(s) Inhalation daily    MEDICATIONS  (PRN):  acetaminophen   Tablet .. 650 milliGRAM(s) Oral every 6 hours PRN Mild Pain (1 - 3), Moderate Pain (4 - 6)  benzocaine 15 mG/menthol 3.6 mG Lozenge 1 Lozenge Oral five times a day PRN Mouth Sores  dextrose 40% Gel 15 Gram(s) Oral once PRN Blood Glucose LESS THAN 70 milliGRAM(s)/deciliter  docusate sodium 200 milliGRAM(s) Oral two times a day PRN Constipation  fluticasone propionate 50 MICROgram(s)/spray Nasal Spray 1 Spray(s) Both Nostrils once PRN nasal congestion  glucagon  Injectable 1 milliGRAM(s) IntraMuscular once PRN Glucose LESS THAN 70 milligrams/deciliter  polyethylene glycol 3350 17 Gram(s) Oral two times a day PRN Constipation  sodium chloride 0.65% Nasal 1 Spray(s) Both Nostrils two times a day PRN Nasal Congestion      Vital Signs Last 24 Hrs  T(C): 36.3 (02 Sep 2019 06:10), Max: 36.9 (01 Sep 2019 20:31)  T(F): 97.4 (02 Sep 2019 06:10), Max: 98.5 (01 Sep 2019 20:31)  HR: 68 (02 Sep 2019 11:50) (63 - 86)  BP: 127/61 (02 Sep 2019 06:10) (110/59 - 130/74)  BP(mean): --  RR: 18 (02 Sep 2019 06:10) (18 - 18)  SpO2: 98% (02 Sep 2019 11:50) (96% - 100%)    CAPILLARY BLOOD GLUCOSE      POCT Blood Glucose.: 296 mg/dL (02 Sep 2019 12:15)  POCT Blood Glucose.: 206 mg/dL (02 Sep 2019 08:32)  POCT Blood Glucose.: 300 mg/dL (01 Sep 2019 21:35)  POCT Blood Glucose.: 221 mg/dL (01 Sep 2019 17:10)    I&O's Summary    01 Sep 2019 07:01  -  02 Sep 2019 07:00  --------------------------------------------------------  IN: 720 mL / OUT: 40 mL / NET: 680 mL        PHYSICAL EXAM  GENERAL: middle age women, wearing hospital gown, well groomed, sitting comfortably in bed in NAD    HEENT: right eye PERRL, MMM, +erythema in posterior pharynx. has a left eye prosthetic,   Pulm: normal work of breathing, mild-moderate expiratory wheezes b/l, no rales or crackles were appreciated  CV: RRR, S1&S2+, no m/r/g appreciated, distant, +chemoport in right anterior chest wall (skin dry and intact)  ABDOMEN: soft, nt, nd, no hepatosplenomegaly. +ostomy   MSK: ROM wnl in upper and lower extremities, no gross abnormalities noted  EXTREMITIES:  no edama, clubbing, or cyanosis  Neuro: A&Ox3, fluent speech, strength 5/5 in the upper and lower extremities, sensation intact in the upper and lower extremities.   SKIN: warm and dry. +vesicular, pustule rash, no well defined border in the R upper and lower quadrant regions. Unilateral, does not cross the midline in the front and does not spread to the back         CBC Full  -  ( 02 Sep 2019 06:55 )  WBC Count : 12.5 K/uL  RBC Count : 5.07 M/uL  Hemoglobin : 12.2 g/dL  Hematocrit : 38.0 %  Platelet Count - Automated : 285 K/uL  Mean Cell Volume : 75.0 fl  Mean Cell Hemoglobin : 24.1 pg  Mean Cell Hemoglobin Concentration : 32.1 gm/dL  Auto Neutrophil # : x  Auto Lymphocyte # : x  Auto Monocyte # : x  Auto Eosinophil # : x  Auto Basophil # : x  Auto Neutrophil % : x  Auto Lymphocyte % : x  Auto Monocyte % : x  Auto Eosinophil % : x  Auto Basophil % : x    09-02    140  |  100  |  20  ----------------------------<  173<H>  4.1   |  28  |  0.68    Ca    9.3      02 Sep 2019 06:55  Phos  4.5     09-02  Mg     2.0     09-02          Culture - Sputum (collected 31 Aug 2019 17:54)  Source: .Sputum  Gram Stain (01 Sep 2019 01:22):    Numerous polymorphonuclear leukocytes per low power field    Rare Squamous epithelial cells per low power field    Few Gram positive cocci in pairs seen per oil power field        RADIOLOGY & ADDITIONAL TESTS:    Imaging Personally Reviewed:    Consultant(s) Notes Reviewed:  Pulm  Care Discussed with Consultants/Other Providers: Pulm

## 2019-09-02 NOTE — PROGRESS NOTE ADULT - NSHPATTENDINGPLANDISCUSS_GEN_ALL_CORE
patient and housestaff
medicine housestaff
patient
patient and housestaff
patient and medical team
WILLIAMS

## 2019-09-03 ENCOUNTER — TRANSCRIPTION ENCOUNTER (OUTPATIENT)
Age: 69
End: 2019-09-03

## 2019-09-03 VITALS — OXYGEN SATURATION: 97 %

## 2019-09-03 LAB
BASOPHILS # BLD AUTO: 0 K/UL — SIGNIFICANT CHANGE UP (ref 0–0.2)
BASOPHILS NFR BLD AUTO: 0.2 % — SIGNIFICANT CHANGE UP (ref 0–2)
EOSINOPHIL # BLD AUTO: 0.1 K/UL — SIGNIFICANT CHANGE UP (ref 0–0.5)
EOSINOPHIL NFR BLD AUTO: 1.3 % — SIGNIFICANT CHANGE UP (ref 0–6)
HCT VFR BLD CALC: 38.6 % — SIGNIFICANT CHANGE UP (ref 34.5–45)
HGB BLD-MCNC: 11.4 G/DL — LOW (ref 11.5–15.5)
LYMPHOCYTES # BLD AUTO: 2.5 K/UL — SIGNIFICANT CHANGE UP (ref 1–3.3)
LYMPHOCYTES # BLD AUTO: 22.4 % — SIGNIFICANT CHANGE UP (ref 13–44)
MCHC RBC-ENTMCNC: 22.5 PG — LOW (ref 27–34)
MCHC RBC-ENTMCNC: 29.5 GM/DL — LOW (ref 32–36)
MCV RBC AUTO: 76.3 FL — LOW (ref 80–100)
MONOCYTES # BLD AUTO: 0.7 K/UL — SIGNIFICANT CHANGE UP (ref 0–0.9)
MONOCYTES NFR BLD AUTO: 6.3 % — SIGNIFICANT CHANGE UP (ref 2–14)
NEUTROPHILS # BLD AUTO: 7.7 K/UL — HIGH (ref 1.8–7.4)
NEUTROPHILS NFR BLD AUTO: 69.7 % — SIGNIFICANT CHANGE UP (ref 43–77)
PLATELET # BLD AUTO: 293 K/UL — SIGNIFICANT CHANGE UP (ref 150–400)
RBC # BLD: 5.06 M/UL — SIGNIFICANT CHANGE UP (ref 3.8–5.2)
RBC # FLD: 14.5 % — SIGNIFICANT CHANGE UP (ref 10.3–14.5)
WBC # BLD: 11 K/UL — HIGH (ref 3.8–10.5)
WBC # FLD AUTO: 11 K/UL — HIGH (ref 3.8–10.5)

## 2019-09-03 PROCEDURE — 99239 HOSP IP/OBS DSCHRG MGMT >30: CPT

## 2019-09-03 RX ORDER — AZITHROMYCIN 500 MG/1
1 TABLET, FILM COATED ORAL
Qty: 13 | Refills: 0
Start: 2019-09-03 | End: 2019-10-02

## 2019-09-03 RX ORDER — PANTOPRAZOLE SODIUM 20 MG/1
1 TABLET, DELAYED RELEASE ORAL
Qty: 30 | Refills: 0
Start: 2019-09-03 | End: 2019-10-02

## 2019-09-03 RX ORDER — PANTOPRAZOLE SODIUM 20 MG/1
1 TABLET, DELAYED RELEASE ORAL
Qty: 0 | Refills: 0 | DISCHARGE

## 2019-09-03 RX ADMIN — Medication 3 MILLILITER(S): at 17:39

## 2019-09-03 RX ADMIN — Medication 3 MILLILITER(S): at 12:25

## 2019-09-03 RX ADMIN — BACITRACIN ZINC NEOMYCIN SULFATE POLYMYXIN B SULFATE 1 APPLICATION(S): 400; 3.5; 5 OINTMENT TOPICAL at 13:04

## 2019-09-03 RX ADMIN — TIOTROPIUM BROMIDE 1 CAPSULE(S): 18 CAPSULE ORAL; RESPIRATORY (INHALATION) at 12:25

## 2019-09-03 RX ADMIN — POLYETHYLENE GLYCOL 3350 17 GRAM(S): 17 POWDER, FOR SOLUTION ORAL at 12:38

## 2019-09-03 RX ADMIN — Medication 200 MILLIGRAM(S): at 12:38

## 2019-09-03 RX ADMIN — Medication 0.25 MILLIGRAM(S): at 06:27

## 2019-09-03 RX ADMIN — Medication 3 MILLILITER(S): at 05:44

## 2019-09-03 RX ADMIN — APIXABAN 5 MILLIGRAM(S): 2.5 TABLET, FILM COATED ORAL at 17:04

## 2019-09-03 RX ADMIN — Medication 2: at 09:13

## 2019-09-03 RX ADMIN — CHLORHEXIDINE GLUCONATE 1 APPLICATION(S): 213 SOLUTION TOPICAL at 08:22

## 2019-09-03 RX ADMIN — Medication 6: at 18:03

## 2019-09-03 RX ADMIN — Medication 0.5 MILLIGRAM(S): at 12:25

## 2019-09-03 RX ADMIN — Medication 4: at 12:34

## 2019-09-03 RX ADMIN — Medication 32 MILLIGRAM(S): at 15:23

## 2019-09-03 RX ADMIN — MONTELUKAST 10 MILLIGRAM(S): 4 TABLET, CHEWABLE ORAL at 12:39

## 2019-09-03 RX ADMIN — INSULIN GLARGINE 10 UNIT(S): 100 INJECTION, SOLUTION SUBCUTANEOUS at 09:13

## 2019-09-03 RX ADMIN — PANTOPRAZOLE SODIUM 40 MILLIGRAM(S): 20 TABLET, DELAYED RELEASE ORAL at 06:27

## 2019-09-03 RX ADMIN — APIXABAN 5 MILLIGRAM(S): 2.5 TABLET, FILM COATED ORAL at 06:27

## 2019-09-03 NOTE — PROGRESS NOTE ADULT - PROVIDER SPECIALTY LIST ADULT
Internal Medicine
Pulmonology

## 2019-09-03 NOTE — PROGRESS NOTE ADULT - SUBJECTIVE AND OBJECTIVE BOX
Authored by Dr Rick Dennis [phone] [facility] / [id number] [facility]    Patient is a 68y old Female who presents with a chief complaint of SOB.    SUBJECTIVE / OVERNIGHT EVENTS: No acute events overnight  This morning pt reported improving SOB from presentation and improved from yesterdau, non-productive.  No CP, fever, chills, N/V, or abd pain elicited.          MEDICATIONS  (STANDING):  ALBUTerol/ipratropium for Nebulization 3 milliLiter(s) Nebulizer every 6 hours  apixaban 5 milliGRAM(s) Oral every 12 hours  atorvastatin 40 milliGRAM(s) Oral at bedtime  buDESOnide    Inhalation Suspension 0.5 milliGRAM(s) Inhalation daily  chlorhexidine 2% Cloths 1 Application(s) Topical daily  dextrose 5%. 1000 milliLiter(s) (50 mL/Hr) IV Continuous <Continuous>  dextrose 50% Injectable 12.5 Gram(s) IV Push once  dextrose 50% Injectable 25 Gram(s) IV Push once  dextrose 50% Injectable 25 Gram(s) IV Push once  digoxin     Tablet 0.25 milliGRAM(s) Oral daily  hydrocortisone 1% Cream 1 Application(s) Topical two times a day  insulin glargine Injectable (LANTUS) 10 Unit(s) SubCutaneous every morning  insulin lispro (HumaLOG) corrective regimen sliding scale   SubCutaneous three times a day before meals  insulin lispro (HumaLOG) corrective regimen sliding scale   SubCutaneous at bedtime  levoFLOXacin  Tablet 500 milliGRAM(s) Oral daily  montelukast 10 milliGRAM(s) Oral daily  neomycin/BACItracin/polymyxin Topical Ointment 1 Application(s) Topical daily  pantoprazole    Tablet 40 milliGRAM(s) Oral before breakfast  tiotropium 18 MICROgram(s) Capsule 1 Capsule(s) Inhalation daily    MEDICATIONS  (PRN):  acetaminophen   Tablet .. 650 milliGRAM(s) Oral every 6 hours PRN Mild Pain (1 - 3), Moderate Pain (4 - 6)  benzocaine 15 mG/menthol 3.6 mG Lozenge 1 Lozenge Oral five times a day PRN Mouth Sores  dextrose 40% Gel 15 Gram(s) Oral once PRN Blood Glucose LESS THAN 70 milliGRAM(s)/deciliter  docusate sodium 200 milliGRAM(s) Oral two times a day PRN Constipation  fluticasone propionate 50 MICROgram(s)/spray Nasal Spray 1 Spray(s) Both Nostrils once PRN nasal congestion  glucagon  Injectable 1 milliGRAM(s) IntraMuscular once PRN Glucose LESS THAN 70 milligrams/deciliter  polyethylene glycol 3350 17 Gram(s) Oral two times a day PRN Constipation  sodium chloride 0.65% Nasal 1 Spray(s) Both Nostrils two times a day PRN Nasal Congestion                Vital Signs Last 24 Hrs  T(C): 36.7 (03 Sep 2019 14:06), Max: 36.9 (02 Sep 2019 22:51)  T(F): 98.1 (03 Sep 2019 14:06), Max: 98.4 (02 Sep 2019 22:51)  HR: 85 (03 Sep 2019 14:06) (64 - 85)  BP: 108/70 (03 Sep 2019 14:06) (108/70 - 116/67)  BP(mean): --  RR: 18 (03 Sep 2019 14:06) (18 - 18)  SpO2: 95% (03 Sep 2019 14:06) (95% - 98%)        CAPILLARY BLOOD GLUCOSE      POCT Blood Glucose.: 230 mg/dL (03 Sep 2019 12:08)  POCT Blood Glucose.: 180 mg/dL (03 Sep 2019 09:06)  POCT Blood Glucose.: 292 mg/dL (02 Sep 2019 22:01)  POCT Blood Glucose.: 232 mg/dL (02 Sep 2019 17:00)        I&O's Summary    02 Sep 2019 07:01  -  03 Sep 2019 07:00  --------------------------------------------------------  IN: 1080 mL / OUT: 775 mL / NET: 305 mL                  PHYSICAL EXAM  GENERAL: middle age women, wearing hospital gown, well groomed, sitting comfortably in bed in NAD    HEENT: right eye PERRL, MMM, +erythema in posterior pharynx. has a left eye prosthetic,   Pulm: normal work of breathing, mild expiratory wheezes b/l, no rales or crackles were appreciated  CV: RRR, S1&S2+, no m/r/g appreciated, distant, +chemoport in right anterior chest wall (skin dry and intact)  ABDOMEN: soft, nt, nd, no hepatosplenomegaly. +ostomy   MSK: ROM wnl in upper and lower extremities, no gross abnormalities noted  EXTREMITIES:  no edama, clubbing, or cyanosis  Neuro: A&Ox3, fluent speech, strength 5/5 in the upper and lower extremities, sensation intact in the upper and lower extremities.   SKIN: warm and dry. +vesicular, pustule rash, no well defined border in the R upper and lower quadrant regions. Unilateral, does not cross the midline in the front and does not spread to the back             CBC Full  -  ( 03 Sep 2019 07:13 )  WBC Count : 11.0 K/uL  RBC Count : 5.06 M/uL  Hemoglobin : 11.4 g/dL  Hematocrit : 38.6 %  Platelet Count - Automated : 293 K/uL  Mean Cell Volume : 76.3 fl  Mean Cell Hemoglobin : 22.5 pg  Mean Cell Hemoglobin Concentration : 29.5 gm/dL  Auto Neutrophil # : 7.7 K/uL  Auto Lymphocyte # : 2.5 K/uL  Auto Monocyte # : 0.7 K/uL  Auto Eosinophil # : 0.1 K/uL  Auto Basophil # : 0.0 K/uL  Auto Neutrophil % : 69.7 %  Auto Lymphocyte % : 22.4 %  Auto Monocyte % : 6.3 %  Auto Eosinophil % : 1.3 %  Auto Basophil % : 0.2 %    09-02    140  |  100  |  20  ----------------------------<  173<H>  4.1   |  28  |  0.68    Ca    9.3      02 Sep 2019 06:55  Phos  4.5     09-02  Mg     2.0     09-02                  Culture - Sputum (collected 31 Aug 2019 17:54)  Source: .Sputum  Gram Stain (01 Sep 2019 01:22):    Numerous polymorphonuclear leukocytes per low power field    Rare Squamous epithelial cells per low power field    Few Gram positive cocci in pairs seen per oil power field        RADIOLOGY & ADDITIONAL TESTS:    Imaging Personally Reviewed:    Consultant(s) Notes Reviewed:  Pulm  Care Discussed with Consultants/Other Providers: Pulm

## 2019-09-03 NOTE — PROGRESS NOTE ADULT - SUBJECTIVE AND OBJECTIVE BOX
CHIEF COMPLAINT:    Interval Events:    REVIEW OF SYSTEMS:  Constitutional: [ ] negative [ ] fevers [ ] chills [ ] weight loss [ ] weight gain  HEENT: [ ] negative [ ] dry eyes [ ] eye irritation [ ] postnasal drip [ ] nasal congestion  CV: [ ] negative  [ ] chest pain [ ] orthopnea [ ] palpitations [ ] murmur  Resp: [ ] negative [ ] cough [ ] shortness of breath [ ] dyspnea [ ] wheezing [ ] sputum [ ] hemoptysis  GI: [ ] negative [ ] nausea [ ] vomiting [ ] diarrhea [ ] constipation [ ] abd pain [ ] dysphagia   : [ ] negative [ ] dysuria [ ] nocturia [ ] hematuria [ ] increased urinary frequency  Musculoskeletal: [ ] negative [ ] back pain [ ] myalgias [ ] arthralgias [ ] fracture  Skin: [ ] negative [ ] rash [ ] itch  Neurological: [ ] negative [ ] headache [ ] dizziness [ ] syncope [ ] weakness [ ] numbness  Psychiatric: [ ] negative [ ] anxiety [ ] depression  Endocrine: [ ] negative [ ] diabetes [ ] thyroid problem  Hematologic/Lymphatic: [ ] negative [ ] anemia [ ] bleeding problem  Allergic/Immunologic: [ ] negative [ ] itchy eyes [ ] nasal discharge [ ] hives [ ] angioedema  [ ] All other systems negative  [ ] Unable to assess ROS because ________    OBJECTIVE:  ICU Vital Signs Last 24 Hrs  T(C): 36.7 (03 Sep 2019 14:06), Max: 36.9 (02 Sep 2019 22:51)  T(F): 98.1 (03 Sep 2019 14:06), Max: 98.4 (02 Sep 2019 22:51)  HR: 85 (03 Sep 2019 14:06) (64 - 85)  BP: 108/70 (03 Sep 2019 14:06) (108/70 - 116/67)  BP(mean): --  ABP: --  ABP(mean): --  RR: 18 (03 Sep 2019 14:06) (18 - 18)  SpO2: 95% (03 Sep 2019 14:06) (95% - 98%)        09-02 @ 07:01  -  09-03 @ 07:00  --------------------------------------------------------  IN: 1080 mL / OUT: 775 mL / NET: 305 mL      CAPILLARY BLOOD GLUCOSE      POCT Blood Glucose.: 230 mg/dL (03 Sep 2019 12:08)      PHYSICAL EXAM:  General:   HEENT:   Lymph Nodes:  Neck:   Respiratory:   Cardiovascular:   Abdomen:   Extremities:   Skin:   Neurological:  Psychiatry:    HOSPITAL MEDICATIONS:  apixaban 5 milliGRAM(s) Oral every 12 hours    levoFLOXacin  Tablet 500 milliGRAM(s) Oral daily    digoxin     Tablet 0.25 milliGRAM(s) Oral daily    atorvastatin 40 milliGRAM(s) Oral at bedtime  dextrose 40% Gel 15 Gram(s) Oral once PRN  dextrose 50% Injectable 12.5 Gram(s) IV Push once  dextrose 50% Injectable 25 Gram(s) IV Push once  dextrose 50% Injectable 25 Gram(s) IV Push once  glucagon  Injectable 1 milliGRAM(s) IntraMuscular once PRN  insulin glargine Injectable (LANTUS) 10 Unit(s) SubCutaneous every morning  insulin lispro (HumaLOG) corrective regimen sliding scale   SubCutaneous three times a day before meals  insulin lispro (HumaLOG) corrective regimen sliding scale   SubCutaneous at bedtime    ALBUTerol/ipratropium for Nebulization 3 milliLiter(s) Nebulizer every 6 hours  buDESOnide    Inhalation Suspension 0.5 milliGRAM(s) Inhalation daily  montelukast 10 milliGRAM(s) Oral daily  tiotropium 18 MICROgram(s) Capsule 1 Capsule(s) Inhalation daily    acetaminophen   Tablet .. 650 milliGRAM(s) Oral every 6 hours PRN    docusate sodium 200 milliGRAM(s) Oral two times a day PRN  pantoprazole    Tablet 40 milliGRAM(s) Oral before breakfast  polyethylene glycol 3350 17 Gram(s) Oral two times a day PRN        dextrose 5%. 1000 milliLiter(s) IV Continuous <Continuous>      benzocaine 15 mG/menthol 3.6 mG Lozenge 1 Lozenge Oral five times a day PRN  chlorhexidine 2% Cloths 1 Application(s) Topical daily  fluticasone propionate 50 MICROgram(s)/spray Nasal Spray 1 Spray(s) Both Nostrils once PRN  hydrocortisone 1% Cream 1 Application(s) Topical two times a day  neomycin/BACItracin/polymyxin Topical Ointment 1 Application(s) Topical daily  sodium chloride 0.65% Nasal 1 Spray(s) Both Nostrils two times a day PRN        LABS:                        11.4   11.0  )-----------( 293      ( 03 Sep 2019 07:13 )             38.6     Hgb Trend: 11.4<--, 12.2<--, 12.0<--, 11.4<--, 11.3<--  09-02    140  |  100  |  20  ----------------------------<  173<H>  4.1   |  28  |  0.68    Ca    9.3      02 Sep 2019 06:55  Phos  4.5     09-02  Mg     2.0     09-02      Creatinine Trend: 0.68<--, 0.64<--, 0.70<--, 0.70<--, 0.62<--, 0.68<--            MICROBIOLOGY:     RADIOLOGY:  [ ] Reviewed and interpreted by me    PULMONARY FUNCTION TESTS:    EKG:

## 2019-09-03 NOTE — PROGRESS NOTE ADULT - PROBLEM SELECTOR PLAN 1
Pt with hx of severe persistent asthma, 10+ intubations (last one 5yrs ago), presents with SOB and dyspnea likely asthma exacerbation 2/2 to viral infection  - RVP + entero/rhino virus  - procalcitonin wnl   - provide supportive care  -c/w singulair, budesonide, spiriva and duonebs Q6H  - saturating well on RA  -follows with Dr. Allison for pulmonology  - continuing iv steroids with plan to switch to PO tomorrow, dispo is to discharge on prednisone taper  - hold azithromycin 250mg 3x/week while on levoquin  - started on Levaquin 500mg daily x 5 days for possible superimposed bacterial bronchitis   - c/w chest PT/Acapella/incentive spirometer  - CXR 8/30: clear lungs, unchanged from previous cxr on 8/26  - pulmonology following, appreciated recs

## 2019-09-03 NOTE — DISCHARGE NOTE NURSING/CASE MANAGEMENT/SOCIAL WORK - PATIENT PORTAL LINK FT
You can access the FollowMyHealth Patient Portal offered by [facility] by registering at the following website: [email or web address]. By joining [facility]’s FollowMyHealth portal, you will also be able to view your health information using other applications (apps) compatible with our system.

## 2019-09-03 NOTE — PROGRESS NOTE ADULT - ASSESSMENT
The patient is a 67 y/o woman with asthma (on long term medrol) c/b adrenal insufficiency, afib on eliquis, DM2, TIA, colon cancer (s/p resection, chemo, RT), tracheobronchomalacia s/p bronchial thermoplasty who presented to the ED with SOB and dyspnea on exertion most likely due to asthma exacerbation 2/2 to viral infection. Receiving Levaquin 500mg x 5 days. Dispo to discharge today.

## 2019-09-10 ENCOUNTER — APPOINTMENT (OUTPATIENT)
Dept: CARDIOTHORACIC SURGERY | Facility: CLINIC | Age: 69
End: 2019-09-10
Payer: MEDICARE

## 2019-09-11 ENCOUNTER — APPOINTMENT (OUTPATIENT)
Dept: CARDIOTHORACIC SURGERY | Facility: CLINIC | Age: 69
End: 2019-09-11
Payer: MEDICARE

## 2019-09-11 VITALS
TEMPERATURE: 98 F | HEART RATE: 93 BPM | DIASTOLIC BLOOD PRESSURE: 72 MMHG | HEIGHT: 65 IN | OXYGEN SATURATION: 96 % | SYSTOLIC BLOOD PRESSURE: 121 MMHG | RESPIRATION RATE: 12 BRPM

## 2019-09-11 VITALS — WEIGHT: 141 LBS | BODY MASS INDEX: 23.46 KG/M2

## 2019-09-11 PROCEDURE — 99214 OFFICE O/P EST MOD 30 MIN: CPT

## 2019-09-11 PROCEDURE — 93000 ELECTROCARDIOGRAM COMPLETE: CPT

## 2019-09-12 ENCOUNTER — APPOINTMENT (OUTPATIENT)
Dept: CARDIOLOGY | Facility: CLINIC | Age: 69
End: 2019-09-12

## 2019-09-12 ENCOUNTER — APPOINTMENT (OUTPATIENT)
Dept: PULMONOLOGY | Facility: CLINIC | Age: 69
End: 2019-09-12
Payer: MEDICARE

## 2019-09-12 VITALS
HEART RATE: 90 BPM | DIASTOLIC BLOOD PRESSURE: 72 MMHG | HEIGHT: 65 IN | SYSTOLIC BLOOD PRESSURE: 122 MMHG | OXYGEN SATURATION: 98 % | BODY MASS INDEX: 23.16 KG/M2 | WEIGHT: 139 LBS | RESPIRATION RATE: 12 BRPM

## 2019-09-12 PROCEDURE — 96372 THER/PROPH/DIAG INJ SC/IM: CPT

## 2019-09-12 PROCEDURE — 99214 OFFICE O/P EST MOD 30 MIN: CPT | Mod: 25

## 2019-09-12 PROCEDURE — 71046 X-RAY EXAM CHEST 2 VIEWS: CPT

## 2019-09-12 RX ORDER — OMALIZUMAB 75 MG/.5ML
75 INJECTION, SOLUTION SUBCUTANEOUS
Qty: 0 | Refills: 0 | Status: COMPLETED | OUTPATIENT
Start: 2019-09-12

## 2019-09-12 RX ORDER — OMALIZUMAB 150 MG/ML
150 INJECTION, SOLUTION SUBCUTANEOUS
Qty: 0 | Refills: 0 | Status: COMPLETED | OUTPATIENT
Start: 2019-09-12

## 2019-09-12 RX ADMIN — OMALIZUMAB 75 MG/0.5ML: 75 INJECTION, SOLUTION SUBCUTANEOUS at 00:00

## 2019-09-12 RX ADMIN — OMALIZUMAB 150 MG/ML: 150 INJECTION, SOLUTION SUBCUTANEOUS at 00:00

## 2019-09-12 NOTE — ASSESSMENT
[FreeTextEntry1] : Ms. Bloom is a 68 year old female with a history of AVDz, asthma, allergy, GERD, TBM, s/p pneumonia, who presents now for a follow up s/p ER visit for high blood pressure/ initiated boosted steroids. S/p PET CT, following up with Dr. Zapien. - She is in the midst of an asthma flair. \par \par Her chronic SOB which is multifactorial due to:\par - tracheomalacia (s/p tracheoplasty with residual frequent mucus production, though patient is non-compliant with vest therapy)\par - chronic bronchitis\par - asthma\par - allergic rhinitis\par - GERD\par - poor breathing mechanics \par - CAD/AV disease\par \par \par problem 1: severe persistent asthma -(steroid dependent)\par - boost Medrol from 4 mg to 16 mg BID x 10 days ; then taper\par -continue to use albuterol via the nebulizer QID \par -followed by Mucomyst QiD\par -followed by the acapella device/ chest vest therapy \par -followed by Perforomist via the nebulizer BID\par -followed by Budesonide via the nebulizer BID \par -continue to use Breo Ellipta 200 at 1 inhalation QD \par -continue to use Spiriva 1 inhalation QD\par -failed Xolair 225 injection; follow up injections every 2 weeks - will restart until Dupixent initiated (Dupixent set up now)\par -continue to use Accolate 20 mg BID\par -Information sheet given about prednisone to the patient to be reviewed, this medication is never to be used without consulting the prescribing physician. Proper dietary restraint is necessary specifically salt containing foods, if any reaction may occur should be reported. \par -Asthma is believed to be caused by inherited (genetic) and environmental factor, but its exact cause is unknown. Asthma may be triggered by allergens, lung infections, or irritants in the air. Asthma triggers are different for each person\par -Inhaler technique reviewed as well as oral hygiene techniques reviewed with patient. Avoidance of cold air, extremes of temperature, rescue inhaler should be used before exercise. Order of medication reviewed with patient. Recommended use of a cool mist humidifier in the bedroom. \par \par problem 2: tracheomalacia, residual bronchomalacia \par -s/p tracheoplasty with Dr. [place] Zapien\par -continue to follow up \par \par problem 3: chronic bronchitis and mucus clearance\par -continue to use acapella device multiple times daily\par -recommended to use chest vest therapy multiple times daily \par -she is being sent for sputum cultures \par Patient has a chronic cough greater than 6 months, tried and failed manual chest physiotherapy at home, no skilled caregiver available at home to perform manual CPT, tried and failed acapella vibratory physiotherapy, and recommended chest vest therapy \par \par problem 4: GERD\par -continue to use Protonix 40 mg before breakfast\par -continue Baclofen 5 mg pre-meal and QHS\par -Rule of 2s: avoid eating too much, eating too late, eating too spicy, eating two hours before bed\par -Things to avoid including overeating, spicy foods, tight clothing, eating within three hours of bed, this list is not all inclusive. \par -For treatment of reflux, possible options discussed including diet control, H2 blockers, PPIs, as well as coating motility agents discussed as treatment options. Timing of meals and proximity of last meal to sleep were discussed. If symptoms persist, a formal gastrointestinal evaluation is needed. \par \par problem 5: allergic rhinitis \par -continue to use nasal saline\par -continue to use Xyzal 5 mg before bed\par -Environmental measures for allergies were encouraged including mattress and pillow cover, air purifier, and environmental controls. \par \par problem 6: hx of abnormal aortic valve / cardiac health\par -continue to follow up with Dr. Leahy, AV Disease, AF\par -echocadiogram in June 2018  (Tosin)\par \par problem 7: colon cancer\par -s/p radiation therapy, surgery \par -continue to use chemotherapy follow up with Dr. King or Dr. Mario\par \par problem 8: poor breathing mechanics\par -Proper breathing techniques were reviewed with an emphasis of exhalation. Patient instructed to breath in for 1 second and out for four seconds. Patient was encouraged to not talk while walking.\par \par problem 9: immunodeficiency\par - -Due to the fact that this pt has had more infections than would be expected and immunological blood work is indicated this would include: IgG subclasses, quantitative immunoglobulins, Strep pneumoniae titers as well as Vitamin D levels. Based on this blood work we will be able to decide where the pt needs additional pneumococcal vaccine either Prevnar 13 or pneumovax. Immunology evaluation will also be potentially indicated.\par \par problem 10: r/o immunodeficiency (on Medrol)\par -Due to the fact that this pt has had more infections than would be expected and immunological blood work is indicated this would include: IgG subclasses, quantitative immunoglobulins, Strep pneumoniae titers as well as Vitamin D levels.\par -Based on this blood work we will be able to decide where the pt needs additional pneumococcal vaccine either Prevnar 13 or pneumovax. Immunology evaluation will also be potentially indicated. \par \par problem 11: abnormal chest CT- ? new nodule- likely inflammation \par - F/u PET/CT 4/2019- if changed Bx (Rupali); follow up and re-evaluate 10/2019\par -questionable DIEUDONNE or HERMELINDO, all sputum is negative \par -CAT scans are the only radiological modality to identify abnormalities w/in the lungs with regards to nodules/masses/lymph nodes. Risks, benefits were reviewed in detail. The guidelines for abnormalities include follow up CT scans at various intervals which could range from 6 weeks to 1 year intervals. If there is a change for the worse then consideration for a biopsy will be considered if you are a candidate. Second opinion evaluation with a thoracic surgeon or an interventional radiologist could be offered. 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 It sometimes develops following a URI, herpes zoster outbreak in pharynx or thyroid or cervical spine injury. However, many patients have no identifiable antecedent explanation. \par \par problem 13:  health maintenance \par -recommended yearly flu shot after October 15\par -recommended strep pneumonia vaccines: Prevnar-13 vaccine, followed by Pneumo vaccine 23 one year following\par -recommended early intervention for URIs\par -recommended regular osteoporosis evaluations\par -recommended early dermatological evaluations\par -recommended after the age of 50 to the age of 70, colonoscopy every 5 years \par \par \par F/U in 6 weeks - SPI / NIOX\par She is encouraged to call with any changes, concerns, or questions.

## 2019-09-12 NOTE — PROCEDURE
[FreeTextEntry1] : CXR reveals a normal sized heart; no evidence of infiltrate or effusion - very mild bronchiectasis (RML and lingular)

## 2019-09-12 NOTE — HISTORY OF PRESENT ILLNESS
[FreeTextEntry1] : Ms. Bloom is a 68 year old female with a history of abnormal CXR/chest CT, allergic rhinitis, severe persistent asthma, bronchiectasis, GERD, COPD, recurrent PNA, PND, s/p tracheoplasty, TBM, and SOB presenting to the office today for a follow up visit. Her chief complaint is cough. \par -she reports that she has been feeling ill since yesterday. she reports some chest discomfort and swallowing issues/choking on solid food.\par -her weight has been generally stable\par -she states that her bowels have been regular\par -she reports that her sense of taste and smell have been good \par -she reports a non-productive, persistent cough \par -she is currently on Medrol; off Xolair\par -she reports feeling persistently fatigued\par -she has been using Albuterol and Breo \par -she denies any headaches, nausea, vomiting, fever, chills, sweats, diarrhea, constipation, dysphagia, dizziness, leg swelling, leg pain, itchy eyes, itchy ears, heartburn, reflux, or sour taste in the mouth.

## 2019-09-12 NOTE — ADDENDUM
[FreeTextEntry1] : All medical record entries made by rogers Arredondo were at Dr. Eulalio Allison's, direction and personally dictated by me on 09/12/2019. I have reviewed the chart and agree that the record accurately reflects my personal performance of the history, physical exam, assessment and plan. I have also personally directed, reviewed, and agree with the discharge instructions. \par  \par \par \par \par

## 2019-09-12 NOTE — PHYSICAL EXAM
[General Appearance - Well Developed] : well developed [Normal Appearance] : normal appearance [Well Groomed] : well groomed [General Appearance - Well Nourished] : well nourished [No Deformities] : no deformities [General Appearance - In No Acute Distress] : no acute distress [Normal Conjunctiva] : the conjunctiva exhibited no abnormalities [Normal Oropharynx] : normal oropharynx [Eyelids - No Xanthelasma] : the eyelids demonstrated no xanthelasmas [Neck Appearance] : the appearance of the neck was normal [II] : II [Neck Cervical Mass (___cm)] : no neck mass was observed [Jugular Venous Distention Increased] : there was no jugular-venous distention [Thyroid Diffuse Enlargement] : the thyroid was not enlarged [Thyroid Nodule] : there were no palpable thyroid nodules [Heart Rate And Rhythm] : heart rate and rhythm were normal [Heart Sounds] : normal S1 and S2 [Respiration, Rhythm And Depth] : normal respiratory rhythm and effort [Exaggerated Use Of Accessory Muscles For Inspiration] : no accessory muscle use [Abdomen Soft] : soft [Abdomen Tenderness] : non-tender [Abdomen Mass (___ Cm)] : no abdominal mass palpated [Abnormal Walk] : normal gait [Gait - Sufficient For Exercise Testing] : the gait was sufficient for exercise testing [Nail Clubbing] : no clubbing of the fingernails [Cyanosis, Localized] : no localized cyanosis [Petechial Hemorrhages (___cm)] : no petechial hemorrhages [Skin Color & Pigmentation] : normal skin color and pigmentation [No Venous Stasis] : no venous stasis [] : no rash [Skin Lesions] : no skin lesions [No Xanthoma] : no  xanthoma was observed [No Skin Ulcers] : no skin ulcer [Sensation] : the sensory exam was normal to light touch and pinprick [Deep Tendon Reflexes (DTR)] : deep tendon reflexes were 2+ and symmetric [Oriented To Time, Place, And Person] : oriented to person, place, and time [Impaired Insight] : insight and judgment were intact [No Focal Deficits] : no focal deficits [Affect] : the affect was normal [FreeTextEntry1] : ostomy in place  [FreeTextEntry2] : Left foot wound

## 2019-09-18 NOTE — HISTORY OF PRESENT ILLNESS
[FreeTextEntry1] : RAYRAY RODRIGUEZ is a 68 year old female here on 09/10/2019, 7.5 months after she was last seen and after undergoing recent CT chest and TTE.  She notes "since February I have been having issues" (pulmonary) and she is concerned about a recent chest CT that she was told demonstrated an enlarged PA.  She was advised to be evaluated for PHTN, and a repeat TTE demonstrated normal pulmonary pressures and a normal right heart.  She has known (stable) moderate to severe aortic insufficiency.  Her TTE was noted as unchanged as compared to the prior study in 2018.  Her ascending aorta was noted to be 3.8cm and unchanged from the prior study in dimension. \par

## 2019-09-18 NOTE — REVIEW OF SYSTEMS
[Fever] : no fever [Chills] : no chills [Feeling Fatigued] : feeling fatigued [Blurry Vision] : no blurred vision [Dyspnea on exertion] : dyspnea during exertion [Chest Pain] : no chest pain [Cough] : cough [Anxiety] : anxiety [Joint Pain] : joint pain [Under Stress] : under stress [Negative] : Endocrine

## 2019-09-18 NOTE — PHYSICAL EXAM
[No Oral Pallor] : no oral pallor [Normal Conjunctiva] : the conjunctiva exhibited no abnormalities [No Oral Cyanosis] : no oral cyanosis [Normal Jugular Venous V Waves Present] : normal jugular venous V waves present [Exaggerated Use Of Accessory Muscles For Inspiration] : no accessory muscle use [Heart Rate And Rhythm] : heart rate and rhythm were normal [Bowel Sounds] : normal bowel sounds [Edema] : no peripheral edema present [Abdomen Soft] : soft [FreeTextEntry1] : gait not assessed [Nail Clubbing] : no clubbing of the fingernails [] : no rash [Skin Color & Pigmentation] : normal skin color and pigmentation [No Venous Stasis] : no venous stasis [Skin Lesions] : no skin lesions [No Skin Ulcers] : no skin ulcer [No Xanthoma] : no  xanthoma was observed [Oriented To Time, Place, And Person] : oriented to person, place, and time [Mood] : the mood was normal [Affect] : the affect was normal [No Anxiety] : not feeling anxious

## 2019-09-18 NOTE — DISCUSSION/SUMMARY
[FreeTextEntry1] : Shirley was recommended to follow up with Cardiology for evaluation of possible pulmonary hypertension.  I have reviewed her recent chest CT (without mention of PA enlargement) and TTE (without evidence of RV enlargement) and normal pulmonary pressures.  She has known (stable) AI and an ascending aorta measuring 3.8cm, which was noted to be unchanged from prior.  I will ask Dr Kodak Boothe, one of our chest Radiologists, to review her CT and evaluate her PA.  I am making no changes to her regimen at this time.

## 2019-09-19 PROCEDURE — 82435 ASSAY OF BLOOD CHLORIDE: CPT

## 2019-09-19 PROCEDURE — 82553 CREATINE MB FRACTION: CPT

## 2019-09-19 PROCEDURE — 80162 ASSAY OF DIGOXIN TOTAL: CPT

## 2019-09-19 PROCEDURE — 87581 M.PNEUMON DNA AMP PROBE: CPT

## 2019-09-19 PROCEDURE — 84145 PROCALCITONIN (PCT): CPT

## 2019-09-19 PROCEDURE — 85014 HEMATOCRIT: CPT

## 2019-09-19 PROCEDURE — 94640 AIRWAY INHALATION TREATMENT: CPT

## 2019-09-19 PROCEDURE — 82947 ASSAY GLUCOSE BLOOD QUANT: CPT

## 2019-09-19 PROCEDURE — 87486 CHLMYD PNEUM DNA AMP PROBE: CPT

## 2019-09-19 PROCEDURE — 80053 COMPREHEN METABOLIC PANEL: CPT

## 2019-09-19 PROCEDURE — 87070 CULTURE OTHR SPECIMN AEROBIC: CPT

## 2019-09-19 PROCEDURE — 84295 ASSAY OF SERUM SODIUM: CPT

## 2019-09-19 PROCEDURE — 83880 ASSAY OF NATRIURETIC PEPTIDE: CPT

## 2019-09-19 PROCEDURE — 83735 ASSAY OF MAGNESIUM: CPT

## 2019-09-19 PROCEDURE — 99285 EMERGENCY DEPT VISIT HI MDM: CPT | Mod: 25

## 2019-09-19 PROCEDURE — 71045 X-RAY EXAM CHEST 1 VIEW: CPT

## 2019-09-19 PROCEDURE — 93306 TTE W/DOPPLER COMPLETE: CPT

## 2019-09-19 PROCEDURE — 87798 DETECT AGENT NOS DNA AMP: CPT

## 2019-09-19 PROCEDURE — 85730 THROMBOPLASTIN TIME PARTIAL: CPT

## 2019-09-19 PROCEDURE — 83605 ASSAY OF LACTIC ACID: CPT

## 2019-09-19 PROCEDURE — 96374 THER/PROPH/DIAG INJ IV PUSH: CPT

## 2019-09-19 PROCEDURE — 83036 HEMOGLOBIN GLYCOSYLATED A1C: CPT

## 2019-09-19 PROCEDURE — 80048 BASIC METABOLIC PNL TOTAL CA: CPT

## 2019-09-19 PROCEDURE — 82550 ASSAY OF CK (CPK): CPT

## 2019-09-19 PROCEDURE — 85610 PROTHROMBIN TIME: CPT

## 2019-09-19 PROCEDURE — 82330 ASSAY OF CALCIUM: CPT

## 2019-09-19 PROCEDURE — 71046 X-RAY EXAM CHEST 2 VIEWS: CPT

## 2019-09-19 PROCEDURE — 85027 COMPLETE CBC AUTOMATED: CPT

## 2019-09-19 PROCEDURE — 87633 RESP VIRUS 12-25 TARGETS: CPT

## 2019-09-19 PROCEDURE — 93005 ELECTROCARDIOGRAM TRACING: CPT

## 2019-09-19 PROCEDURE — 82803 BLOOD GASES ANY COMBINATION: CPT

## 2019-09-19 PROCEDURE — 82962 GLUCOSE BLOOD TEST: CPT

## 2019-09-19 PROCEDURE — 84100 ASSAY OF PHOSPHORUS: CPT

## 2019-09-19 PROCEDURE — 84132 ASSAY OF SERUM POTASSIUM: CPT

## 2019-09-19 PROCEDURE — 84484 ASSAY OF TROPONIN QUANT: CPT

## 2019-09-24 ENCOUNTER — RX RENEWAL (OUTPATIENT)
Age: 69
End: 2019-09-24

## 2019-09-25 ENCOUNTER — RX RENEWAL (OUTPATIENT)
Age: 69
End: 2019-09-25

## 2019-10-02 NOTE — ED ADULT NURSE NOTE - NS ED NOTE ABUSE RESPONSE YN
Neville King)  Ophthalmology  [address] [facility], [address] [place] [address]  Phone: [phone]  Fax: [phone]  Follow Up Time:
Yes

## 2019-10-03 ENCOUNTER — RX CHANGE (OUTPATIENT)
Age: 69
End: 2019-10-03

## 2019-10-06 LAB — BACTERIA SPT CULT: ABNORMAL

## 2019-10-07 ENCOUNTER — RX CHANGE (OUTPATIENT)
Age: 69
End: 2019-10-07

## 2019-10-08 ENCOUNTER — MEDICATION RENEWAL (OUTPATIENT)
Age: 69
End: 2019-10-08

## 2019-10-15 ENCOUNTER — APPOINTMENT (OUTPATIENT)
Dept: PULMONOLOGY | Facility: CLINIC | Age: 69
End: 2019-10-15
Payer: MEDICARE

## 2019-10-15 ENCOUNTER — NON-APPOINTMENT (OUTPATIENT)
Age: 69
End: 2019-10-15

## 2019-10-15 VITALS
DIASTOLIC BLOOD PRESSURE: 70 MMHG | HEART RATE: 76 BPM | SYSTOLIC BLOOD PRESSURE: 130 MMHG | WEIGHT: 137 LBS | BODY MASS INDEX: 22.82 KG/M2 | OXYGEN SATURATION: 98 % | HEIGHT: 65 IN | RESPIRATION RATE: 17 BRPM

## 2019-10-15 PROCEDURE — 99214 OFFICE O/P EST MOD 30 MIN: CPT | Mod: 25

## 2019-10-15 PROCEDURE — 94010 BREATHING CAPACITY TEST: CPT

## 2019-10-15 NOTE — PROCEDURE
[FreeTextEntry1] : PFTs-spi reveals mild restrictive dysfunction and moderate obstructive dysfunction--fev1 was 1.02 liters--54% predicted, normal fv loop

## 2019-10-15 NOTE — PHYSICAL EXAM
[General Appearance - Well Developed] : well developed [Normal Appearance] : normal appearance [General Appearance - Well Nourished] : well nourished [Well Groomed] : well groomed [No Deformities] : no deformities [General Appearance - In No Acute Distress] : no acute distress [Normal Conjunctiva] : the conjunctiva exhibited no abnormalities [Eyelids - No Xanthelasma] : the eyelids demonstrated no xanthelasmas [Normal Oropharynx] : normal oropharynx [II] : II [Neck Appearance] : the appearance of the neck was normal [Neck Cervical Mass (___cm)] : no neck mass was observed [Jugular Venous Distention Increased] : there was no jugular-venous distention [Thyroid Diffuse Enlargement] : the thyroid was not enlarged [Thyroid Nodule] : there were no palpable thyroid nodules [Heart Rate And Rhythm] : heart rate and rhythm were normal [Heart Sounds] : normal S1 and S2 [Exaggerated Use Of Accessory Muscles For Inspiration] : no accessory muscle use [Respiration, Rhythm And Depth] : normal respiratory rhythm and effort [Abdomen Soft] : soft [Abdomen Tenderness] : non-tender [Abdomen Mass (___ Cm)] : no abdominal mass palpated [Abnormal Walk] : normal gait [Gait - Sufficient For Exercise Testing] : the gait was sufficient for exercise testing [Nail Clubbing] : no clubbing of the fingernails [Cyanosis, Localized] : no localized cyanosis [Petechial Hemorrhages (___cm)] : no petechial hemorrhages [] : no rash [Skin Color & Pigmentation] : normal skin color and pigmentation [No Venous Stasis] : no venous stasis [No Xanthoma] : no  xanthoma was observed [No Skin Ulcers] : no skin ulcer [Skin Lesions] : no skin lesions [Deep Tendon Reflexes (DTR)] : deep tendon reflexes were 2+ and symmetric [Sensation] : the sensory exam was normal to light touch and pinprick [Impaired Insight] : insight and judgment were intact [Oriented To Time, Place, And Person] : oriented to person, place, and time [No Focal Deficits] : no focal deficits [Affect] : the affect was normal [FreeTextEntry1] : I:E ratio 1:2; expiratory wheeze / rhonchi b/l [FreeTextEntry2] : Left foot wound

## 2019-10-15 NOTE — REASON FOR VISIT
[Follow-Up] : a follow-up visit [FreeTextEntry1] : abnormal CXR/chest CT, allergic rhinitis, severe persistent asthma, bronchiectasis, GERD, COPD, recurrent PNA, PND, s/p tracheoplasty, TBM, and SOB no abrasions, no jaundice, no lesions, no pruritis, and no rashes.

## 2019-10-15 NOTE — PHYSICAL EXAM
[General Appearance - Well Developed] : well developed [Normal Appearance] : normal appearance [Well Groomed] : well groomed [General Appearance - Well Nourished] : well nourished [Normal Conjunctiva] : the conjunctiva exhibited no abnormalities [General Appearance - In No Acute Distress] : no acute distress [No Deformities] : no deformities [II] : II [Eyelids - No Xanthelasma] : the eyelids demonstrated no xanthelasmas [Normal Oropharynx] : normal oropharynx [Neck Appearance] : the appearance of the neck was normal [Neck Cervical Mass (___cm)] : no neck mass was observed [Jugular Venous Distention Increased] : there was no jugular-venous distention [Thyroid Nodule] : there were no palpable thyroid nodules [Thyroid Diffuse Enlargement] : the thyroid was not enlarged [Heart Rate And Rhythm] : heart rate and rhythm were normal [Heart Sounds] : normal S1 and S2 [Respiration, Rhythm And Depth] : normal respiratory rhythm and effort [Exaggerated Use Of Accessory Muscles For Inspiration] : no accessory muscle use [Abdomen Soft] : soft [Abdomen Tenderness] : non-tender [Abdomen Mass (___ Cm)] : no abdominal mass palpated [Abnormal Walk] : normal gait [Nail Clubbing] : no clubbing of the fingernails [Gait - Sufficient For Exercise Testing] : the gait was sufficient for exercise testing [Cyanosis, Localized] : no localized cyanosis [Petechial Hemorrhages (___cm)] : no petechial hemorrhages [Skin Color & Pigmentation] : normal skin color and pigmentation [] : no rash [No Venous Stasis] : no venous stasis [No Skin Ulcers] : no skin ulcer [Skin Lesions] : no skin lesions [No Xanthoma] : no  xanthoma was observed [Sensation] : the sensory exam was normal to light touch and pinprick [Deep Tendon Reflexes (DTR)] : deep tendon reflexes were 2+ and symmetric [Oriented To Time, Place, And Person] : oriented to person, place, and time [Impaired Insight] : insight and judgment were intact [No Focal Deficits] : no focal deficits [Affect] : the affect was normal [FreeTextEntry1] : I:E ratio 1:2; expiratory wheeze / rhonchi b/l [FreeTextEntry2] : Left foot wound

## 2019-10-15 NOTE — HISTORY OF PRESENT ILLNESS
[FreeTextEntry1] : Ms. Bloom is a 68 year old female with a history of abnormal CXR/chest CT, allergic rhinitis, severe persistent asthma, bronchiectasis, GERD, COPD, recurrent PNA, PND, s/p tracheoplasty, TBM, and SOB presenting to the office today for a follow up visit. Her chief complaint is \par \par -she denies any headaches, nausea, vomiting, fever, chills, sweats, chest pain, chest pressure, diarrhea, constipation, dysphagia, dizziness, leg swelling, leg pain, itchy eyes, itchy ears, heartburn, reflux, or sour taste in the mouth.

## 2019-10-17 NOTE — ED PROCEDURE NOTE - PROCEDURE ADDITIONAL DETAILS
Emergency Department Focused Ultrasound performed at patient's bedside for educational purposes. The study will have a follow up study performed or was performed in the direct supervision of an ultrasound trained attending. Yes

## 2019-10-21 ENCOUNTER — FORM ENCOUNTER (OUTPATIENT)
Age: 69
End: 2019-10-21

## 2019-10-22 ENCOUNTER — OUTPATIENT (OUTPATIENT)
Dept: OUTPATIENT SERVICES | Facility: HOSPITAL | Age: 69
LOS: 1 days | End: 2019-10-22
Payer: MEDICARE

## 2019-10-22 ENCOUNTER — APPOINTMENT (OUTPATIENT)
Dept: CT IMAGING | Facility: IMAGING CENTER | Age: 69
End: 2019-10-22
Payer: MEDICARE

## 2019-10-22 DIAGNOSIS — C21.1 MALIGNANT NEOPLASM OF ANAL CANAL: ICD-10-CM

## 2019-10-22 DIAGNOSIS — Z98.89 OTHER SPECIFIED POSTPROCEDURAL STATES: Chronic | ICD-10-CM

## 2019-10-22 DIAGNOSIS — Z96.653 PRESENCE OF ARTIFICIAL KNEE JOINT, BILATERAL: Chronic | ICD-10-CM

## 2019-10-22 DIAGNOSIS — Z87.09 PERSONAL HISTORY OF OTHER DISEASES OF THE RESPIRATORY SYSTEM: Chronic | ICD-10-CM

## 2019-10-22 DIAGNOSIS — K62.5 HEMORRHAGE OF ANUS AND RECTUM: Chronic | ICD-10-CM

## 2019-10-22 DIAGNOSIS — Z98.890 OTHER SPECIFIED POSTPROCEDURAL STATES: Chronic | ICD-10-CM

## 2019-10-22 DIAGNOSIS — H05.352 EXOSTOSIS OF LEFT ORBIT: Chronic | ICD-10-CM

## 2019-10-22 PROCEDURE — 71260 CT THORAX DX C+: CPT | Mod: 26

## 2019-10-22 PROCEDURE — 82565 ASSAY OF CREATININE: CPT

## 2019-10-22 PROCEDURE — 74177 CT ABD & PELVIS W/CONTRAST: CPT

## 2019-10-22 PROCEDURE — 74177 CT ABD & PELVIS W/CONTRAST: CPT | Mod: 26

## 2019-10-22 PROCEDURE — 71260 CT THORAX DX C+: CPT

## 2019-10-28 NOTE — ED ADULT NURSE REASSESSMENT NOTE - MUSCULOSKELETAL ASSESSMENT
What Type Of Note Output Would You Prefer (Optional)?: Bullet Format
How Severe Is Your Acne?: mild
Is This A New Presentation, Or A Follow-Up?: Acne
WDL

## 2019-10-30 ENCOUNTER — APPOINTMENT (OUTPATIENT)
Dept: THORACIC SURGERY | Facility: CLINIC | Age: 69
End: 2019-10-30

## 2019-10-30 VITALS
SYSTOLIC BLOOD PRESSURE: 155 MMHG | BODY MASS INDEX: 22.82 KG/M2 | DIASTOLIC BLOOD PRESSURE: 67 MMHG | HEART RATE: 94 BPM | RESPIRATION RATE: 18 BRPM | OXYGEN SATURATION: 99 % | TEMPERATURE: 97.2 F | WEIGHT: 137 LBS | HEIGHT: 65 IN

## 2019-10-31 ENCOUNTER — APPOINTMENT (OUTPATIENT)
Dept: THORACIC SURGERY | Facility: CLINIC | Age: 69
End: 2019-10-31
Payer: MEDICARE

## 2019-10-31 ENCOUNTER — OUTPATIENT (OUTPATIENT)
Dept: OUTPATIENT SERVICES | Facility: HOSPITAL | Age: 69
LOS: 1 days | End: 2019-10-31
Payer: MEDICARE

## 2019-10-31 VITALS
HEART RATE: 70 BPM | OXYGEN SATURATION: 98 % | SYSTOLIC BLOOD PRESSURE: 158 MMHG | DIASTOLIC BLOOD PRESSURE: 69 MMHG | WEIGHT: 137 LBS | TEMPERATURE: 97.2 F | BODY MASS INDEX: 22.82 KG/M2 | RESPIRATION RATE: 18 BRPM | HEIGHT: 65 IN

## 2019-10-31 DIAGNOSIS — Z01.818 ENCOUNTER FOR OTHER PREPROCEDURAL EXAMINATION: ICD-10-CM

## 2019-10-31 DIAGNOSIS — Z96.653 PRESENCE OF ARTIFICIAL KNEE JOINT, BILATERAL: Chronic | ICD-10-CM

## 2019-10-31 DIAGNOSIS — Z98.89 OTHER SPECIFIED POSTPROCEDURAL STATES: Chronic | ICD-10-CM

## 2019-10-31 DIAGNOSIS — Z87.09 PERSONAL HISTORY OF OTHER DISEASES OF THE RESPIRATORY SYSTEM: Chronic | ICD-10-CM

## 2019-10-31 DIAGNOSIS — K62.5 HEMORRHAGE OF ANUS AND RECTUM: Chronic | ICD-10-CM

## 2019-10-31 DIAGNOSIS — H05.352 EXOSTOSIS OF LEFT ORBIT: Chronic | ICD-10-CM

## 2019-10-31 DIAGNOSIS — Z98.890 OTHER SPECIFIED POSTPROCEDURAL STATES: Chronic | ICD-10-CM

## 2019-10-31 LAB
ALBUMIN SERPL ELPH-MCNC: 4.4 G/DL — SIGNIFICANT CHANGE UP (ref 3.3–5)
ALP SERPL-CCNC: 86 U/L — SIGNIFICANT CHANGE UP (ref 40–120)
ALT FLD-CCNC: 15 U/L — SIGNIFICANT CHANGE UP (ref 10–45)
ANION GAP SERPL CALC-SCNC: 17 MMOL/L — SIGNIFICANT CHANGE UP (ref 5–17)
APPEARANCE UR: CLEAR — SIGNIFICANT CHANGE UP
APTT BLD: 35.4 SEC — SIGNIFICANT CHANGE UP (ref 27.5–36.3)
AST SERPL-CCNC: 18 U/L — SIGNIFICANT CHANGE UP (ref 10–40)
BACTERIA # UR AUTO: PRESENT /HPF
BASOPHILS # BLD AUTO: 0.02 K/UL — SIGNIFICANT CHANGE UP (ref 0–0.2)
BASOPHILS NFR BLD AUTO: 0.2 % — SIGNIFICANT CHANGE UP (ref 0–2)
BILIRUB SERPL-MCNC: 0.5 MG/DL — SIGNIFICANT CHANGE UP (ref 0.2–1.2)
BILIRUB UR-MCNC: NEGATIVE — SIGNIFICANT CHANGE UP
BUN SERPL-MCNC: 13 MG/DL — SIGNIFICANT CHANGE UP (ref 7–23)
CALCIUM SERPL-MCNC: 9 MG/DL — SIGNIFICANT CHANGE UP (ref 8.4–10.5)
CHLORIDE SERPL-SCNC: 103 MMOL/L — SIGNIFICANT CHANGE UP (ref 96–108)
CO2 SERPL-SCNC: 22 MMOL/L — SIGNIFICANT CHANGE UP (ref 22–31)
COD CRY URNS QL: ABNORMAL /HPF
COLOR SPEC: YELLOW — SIGNIFICANT CHANGE UP
CREAT SERPL-MCNC: 0.75 MG/DL — SIGNIFICANT CHANGE UP (ref 0.5–1.3)
DIFF PNL FLD: ABNORMAL
EOSINOPHIL # BLD AUTO: 0.02 K/UL — SIGNIFICANT CHANGE UP (ref 0–0.5)
EOSINOPHIL NFR BLD AUTO: 0.2 % — SIGNIFICANT CHANGE UP (ref 0–6)
EPI CELLS # UR: SIGNIFICANT CHANGE UP /HPF (ref 0–5)
GLUCOSE SERPL-MCNC: 117 MG/DL — HIGH (ref 70–99)
GLUCOSE UR QL: NEGATIVE — SIGNIFICANT CHANGE UP
HCT VFR BLD CALC: 44.3 % — SIGNIFICANT CHANGE UP (ref 34.5–45)
HGB BLD-MCNC: 13.3 G/DL — SIGNIFICANT CHANGE UP (ref 11.5–15.5)
IMM GRANULOCYTES NFR BLD AUTO: 0.7 % — SIGNIFICANT CHANGE UP (ref 0–1.5)
INR BLD: 1.27 — HIGH (ref 0.88–1.16)
KETONES UR-MCNC: NEGATIVE — SIGNIFICANT CHANGE UP
LEUKOCYTE ESTERASE UR-ACNC: NEGATIVE — SIGNIFICANT CHANGE UP
LYMPHOCYTES # BLD AUTO: 0.84 K/UL — LOW (ref 1–3.3)
LYMPHOCYTES # BLD AUTO: 8.3 % — LOW (ref 13–44)
MCHC RBC-ENTMCNC: 23.1 PG — LOW (ref 27–34)
MCHC RBC-ENTMCNC: 30 GM/DL — LOW (ref 32–36)
MCV RBC AUTO: 76.9 FL — LOW (ref 80–100)
MONOCYTES # BLD AUTO: 0.45 K/UL — SIGNIFICANT CHANGE UP (ref 0–0.9)
MONOCYTES NFR BLD AUTO: 4.4 % — SIGNIFICANT CHANGE UP (ref 2–14)
NEUTROPHILS # BLD AUTO: 8.75 K/UL — HIGH (ref 1.8–7.4)
NEUTROPHILS NFR BLD AUTO: 86.2 % — HIGH (ref 43–77)
NITRITE UR-MCNC: NEGATIVE — SIGNIFICANT CHANGE UP
NRBC # BLD: 0 /100 WBCS — SIGNIFICANT CHANGE UP (ref 0–0)
PH UR: 5.5 — SIGNIFICANT CHANGE UP (ref 5–8)
PLATELET # BLD AUTO: 267 K/UL — SIGNIFICANT CHANGE UP (ref 150–400)
POTASSIUM SERPL-MCNC: 5 MMOL/L — SIGNIFICANT CHANGE UP (ref 3.5–5.3)
POTASSIUM SERPL-SCNC: 5 MMOL/L — SIGNIFICANT CHANGE UP (ref 3.5–5.3)
PROT SERPL-MCNC: 6.8 G/DL — SIGNIFICANT CHANGE UP (ref 6–8.3)
PROT UR-MCNC: NEGATIVE MG/DL — SIGNIFICANT CHANGE UP
PROTHROM AB SERPL-ACNC: 14.4 SEC — HIGH (ref 10–12.9)
RBC # BLD: 5.76 M/UL — HIGH (ref 3.8–5.2)
RBC # FLD: 19 % — HIGH (ref 10.3–14.5)
RBC CASTS # UR COMP ASSIST: < 5 /HPF — SIGNIFICANT CHANGE UP
SODIUM SERPL-SCNC: 142 MMOL/L — SIGNIFICANT CHANGE UP (ref 135–145)
SP GR SPEC: >=1.03 — SIGNIFICANT CHANGE UP (ref 1–1.03)
UROBILINOGEN FLD QL: 0.2 E.U./DL — SIGNIFICANT CHANGE UP
WBC # BLD: 10.15 K/UL — SIGNIFICANT CHANGE UP (ref 3.8–10.5)
WBC # FLD AUTO: 10.15 K/UL — SIGNIFICANT CHANGE UP (ref 3.8–10.5)
WBC UR QL: < 5 /HPF — SIGNIFICANT CHANGE UP

## 2019-10-31 PROCEDURE — 85730 THROMBOPLASTIN TIME PARTIAL: CPT

## 2019-10-31 PROCEDURE — 80053 COMPREHEN METABOLIC PANEL: CPT

## 2019-10-31 PROCEDURE — 99213 OFFICE O/P EST LOW 20 MIN: CPT

## 2019-10-31 PROCEDURE — 85025 COMPLETE CBC W/AUTO DIFF WBC: CPT

## 2019-10-31 PROCEDURE — 81001 URINALYSIS AUTO W/SCOPE: CPT

## 2019-10-31 PROCEDURE — 85610 PROTHROMBIN TIME: CPT

## 2019-10-31 PROCEDURE — 93005 ELECTROCARDIOGRAM TRACING: CPT

## 2019-10-31 PROCEDURE — 93010 ELECTROCARDIOGRAM REPORT: CPT

## 2019-11-02 LAB — BACTERIA SPT CULT: ABNORMAL

## 2019-11-04 ENCOUNTER — RX RENEWAL (OUTPATIENT)
Age: 69
End: 2019-11-04

## 2019-11-05 NOTE — ADDENDUM
[FreeTextEntry1] : Documented by Mike Gomez acting as a scribe for Dr. Zohaib Zapien on 10/31/2019\par

## 2019-11-05 NOTE — PHYSICAL EXAM
[] : no respiratory distress [Respiration, Rhythm And Depth] : normal respiratory rhythm and effort [Exaggerated Use Of Accessory Muscles For Inspiration] : no accessory muscle use [Apical Impulse] : the apical impulse was normal [Heart Rate And Rhythm] : heart rate was normal and rhythm regular [Heart Sounds] : normal S1 and S2 [2+] : left 2+ [Abnormal Walk] : normal gait [Oriented To Time, Place, And Person] : oriented to person, place, and time [FreeTextEntry1] : ramon

## 2019-11-05 NOTE — ASSESSMENT
[FreeTextEntry1] : 68 y/o female with a PMH of stage III A, T2N1 squamous cell carcinoma of anus, s/p chemo and radiation, tracheobronchomalacia s/p Right VATS, robotic assisted tracheobronchoplasty with Prolene mesh on 10/25/16. \par \par Today patient complains of productive cough and states that her lungs "does not feel right" as well as SOB that "feels different" than before. Patient's cough has barky/seal-like characteristic to it, suspicious for recurrent TBM. I reviewed the recent Dynamic CT Chest with the patient which shows about 70% narrowing of the trachea and bilateral bronchi. I also reviewed the Pre-operative scans and from my perspective, I do not see a recurrence. However, given patient's recent Dynamic CT scan and patient's symptoms, a repeat Awake Bronchoscopy is warranted to further investigate. Previous bronchoscopy last June revealed minor posterior membranous bulge less than 40%. \par \par Will plan to collect specimen during to send for culture study. I discussed with the patient that a laser procedure may be an alternative option to surgery. Will further discuss treatment options pending Awake bronchoscopy findings. \par \par I have reviewed the patient's medical records and diagnostic images at the time of this office visit and have made the following recommendations\par Plan:\par 1. Schedule for Awake Bronchoscopy 11/08/19\par 2. RTO after to discuss findings and determine a plan of care\par

## 2019-11-05 NOTE — HISTORY OF PRESENT ILLNESS
[FreeTextEntry1] : 68 y/o female with a PMH of stage III A, T2N1 squamous cell carcinoma of anus, s/p chemo and radiation, tracheobronchomalacia s/p Right VATS, robotic assisted tracheobronchoplasty with Prolene mesh on 10/25/16. She is currently on Eliquis for Afib. She presents today for a follow up visit with a Dynamic CT chest, and CT abdomen and pelvis, for evaluation of TBM and pulmonary nodules. \par \par Today she admits to pulmonary congestion with recent culture revealing Group B streptococcus agalactiae and is being treated with two rounds of Cefdinir. \par \par Awake bronchoscopy with lavage on 9/19/17:\par -less than 50% collapse of the proximal and mid trachea on cough and deep expiration, and 70-80% collapse of the distal trachea and bilateral mainstem bronchi\par -lavage culture was positive for Acinetobacter lwoffi. \par \par Admission to [facility] in late Dec 2017 for SOB. CXR and CT chest were done during hospitalization, with two stable exophytic pancreatic tail cysts seen. She also had colonoscopy done which demonstrated a 2 cm hard anal lesion. Pathology revealed gastric antral mucosa with mild reactive gastropathy.\par \par PET CT 1/5/18 revealed nonspecific FDG activity in the anal region. It also revealed mucoid impacted distal airways, small peripheral nodules and scattered peripheral right lower lobe tree-in-bud opacities (stable).\par MRI of the brain done on 1/17/18 showed no evidence of metastatic disease. \par \par She was seen by Dr. Terrell Luong and on 2/9/18 she underwent a biopsy of the anal lesion. Pathology showed recurrence of invasive, moderately to poorly differentiated squamous cell carcinoma. Dr. Luong determined she was not a surgical candidate and her oncologist Dr. Zach King determined she was not a candidate for systemic chemotherapy due to her cardiac and pulmonary issues and referred her to radiation oncology.\par \par She completed of 3000 cGy RT to anus on 4/18/18 with Dr. Amanda Burger, and tolerated the treatment well. \par She was last seen in our office on 3/15/18, the plan was for her to undergo RT. Once she completes the RT, will focus on management of her aortic valve disease. \par \par Echocardiogram done on 5/3/18:\par -EF 63% (previously 70% in March 2017), mild aortic regurgitation\par \par CT chest/ abd/ pelvis completed on 12/13/18:\par - stable post-op changes s/p distal colectomy \par - improvement of RIGHT lower lobe with consolidation compared to prior examination with mild residual opacity which is favored to be inflammatory in etiology\par - indeterminate nodular opacity along the dome of the right hemidiaphragm measuring 10 mm.\par - no evidence of metastatic disease in abdomen or pelvis\par - no evidence of thoracic, abdominal, or pelvic lymphadenopathy\par \par CT chest completed on 03/21/19:\par -high attenuation foci as well as branching tubular opacities in the anterior segment of the right lower lobe are unchanged \par \par PET CT completed on 04/18/19:\par -indeterminate FDG-avid Right middle lobe subpleural nodular opacity\par -non fdg-avid 1cm nodular density along the Right hemidiaphragm compared to prior PET/CT and increased in size as compared to prior \par \par CT chest completed on 04/24/19:\par -no consolidation\par -a 5mm nodule in the right lower lobe\par -bronchial wall thickening right worse than left lower lobe\par -centrilobular groundglass nodularity in the Right lower lobe\par -asymmetric reticulation identified subpleural right middle and anterior right lower lobe\par \par Spirometry done on 06/11/19 showed FEV1 = 0.74, 39% of predicted value, FVC = 1.62, 65% of predicted value, PEF = 2.08, 42% of predicted value.\par  \par CT chest completed on 06/21/19:\par -1.7 x 1.3cm nodule in the peripheral aspect of the anterior segment of the Right lower lobe, previously a patchy ill-defined opacity\par -mild increase in pleural thickening in the lateral segment of the right middle lobe\par \par PET scan completed on 7/3/19:\par - minimally FDG avid nodular opacity in subpleural RML is increased in size and decreased in metabolism as compared to prior PET (SUV 3.2) \par - mildly FDG avid 1.7 x 1.3 cm nodule in the right peripheral aspect of the anterior segment of the right lower lobe is increase in size and FDG avidity as compared to 4/18/19, and is not significantly changed as compared to CT date 6/21/10 (SUV 3.0) \par \par CT chest completed on 08/13/19:\par -right lower lobe peripheral nodule measures 1.6 x 1.1cm has slightly decreased in size when compared \par -right middle lobe peripheral pleural-based opacity measures 2.7 x 0.9cm and is unchanged \par -minimal areas of bronchial thickening in the right middle lobe and bilateral lower lobes. \par -right basilar nodule measuring 1.0 cm is uncharged \par \par NM VQ scan completed on 08/13/19:\par -low probability of pulmonary embolus \par -interval resolution of matched ventilation/ perfusion defect involving the basal segment of the left lower lobe\par \par CT chest completed on 10/22/19:\par - Tracheobronchomalacia. ( 70% narrowing of trachea and bilateral bronchi)\par Interval significant decrease of subpleural nodules within the right middle lobe and right lower lobe.\par No new or enlarging pulmonary nodule. \par Again noted, the bones are heterogeneous with multiple sclerotic foci within the vertebral bodies, \par indeterminant. Further evaluation can be performed with both bone scan.\par \par CT abdomen and pelvis completed on 10/22/19:\par - No evidence of metastatic disease in the abdomen/pelvis. \par - Pancreatic tail cysts measuring up to 2.1 cm without significant change. \par \par

## 2019-11-05 NOTE — END OF VISIT
[FreeTextEntry3] : All medical record entries made by the Scribe were at my, Dr. Zapien's direction and personally dictated by me on 10/31/2019 . I have reviewed the chart and agree that the record accurately reflects my personal performance of the history, physical exam, assessment and plan. I have also personally directed, reviewed, and agreed with the chart.\par

## 2019-11-06 ENCOUNTER — RX RENEWAL (OUTPATIENT)
Age: 69
End: 2019-11-06

## 2019-11-06 RX ORDER — TOBRAMYCIN 300 MG/5ML
300 SOLUTION RESPIRATORY (INHALATION)
Qty: 1 | Refills: 0 | Status: DISCONTINUED | OUTPATIENT
Start: 2019-11-04 | End: 2019-11-06

## 2019-11-08 ENCOUNTER — MEDICATION RENEWAL (OUTPATIENT)
Age: 69
End: 2019-11-08

## 2019-11-08 ENCOUNTER — OUTPATIENT (OUTPATIENT)
Dept: OUTPATIENT SERVICES | Facility: HOSPITAL | Age: 69
LOS: 1 days | Discharge: HOME | End: 2019-11-08
Payer: MEDICARE

## 2019-11-08 ENCOUNTER — APPOINTMENT (OUTPATIENT)
Dept: THORACIC SURGERY | Facility: HOSPITAL | Age: 69
End: 2019-11-08

## 2019-11-08 DIAGNOSIS — Z98.89 OTHER SPECIFIED POSTPROCEDURAL STATES: Chronic | ICD-10-CM

## 2019-11-08 DIAGNOSIS — H05.352 EXOSTOSIS OF LEFT ORBIT: Chronic | ICD-10-CM

## 2019-11-08 DIAGNOSIS — Z96.653 PRESENCE OF ARTIFICIAL KNEE JOINT, BILATERAL: Chronic | ICD-10-CM

## 2019-11-08 DIAGNOSIS — Z98.890 OTHER SPECIFIED POSTPROCEDURAL STATES: Chronic | ICD-10-CM

## 2019-11-08 DIAGNOSIS — K62.5 HEMORRHAGE OF ANUS AND RECTUM: Chronic | ICD-10-CM

## 2019-11-08 DIAGNOSIS — Z87.09 PERSONAL HISTORY OF OTHER DISEASES OF THE RESPIRATORY SYSTEM: Chronic | ICD-10-CM

## 2019-11-08 LAB
GRAM STN FLD: SIGNIFICANT CHANGE UP
SPECIMEN SOURCE: SIGNIFICANT CHANGE UP

## 2019-11-08 PROCEDURE — 31624 DX BRONCHOSCOPE/LAVAGE: CPT

## 2019-11-08 PROCEDURE — 87116 MYCOBACTERIA CULTURE: CPT

## 2019-11-08 PROCEDURE — 87252 VIRUS INOCULATION TISSUE: CPT

## 2019-11-08 PROCEDURE — 87015 SPECIMEN INFECT AGNT CONCNTJ: CPT

## 2019-11-08 PROCEDURE — 87206 SMEAR FLUORESCENT/ACID STAI: CPT

## 2019-11-08 PROCEDURE — 87070 CULTURE OTHR SPECIMN AEROBIC: CPT

## 2019-11-08 PROCEDURE — 87102 FUNGUS ISOLATION CULTURE: CPT

## 2019-11-08 PROCEDURE — 87186 SC STD MICRODIL/AGAR DIL: CPT

## 2019-11-09 LAB
NIGHT BLUE STAIN TISS: SIGNIFICANT CHANGE UP
SPECIMEN SOURCE: SIGNIFICANT CHANGE UP

## 2019-11-11 LAB
-  AMPICILLIN/SULBACTAM: SIGNIFICANT CHANGE UP
-  CEFEPIME: SIGNIFICANT CHANGE UP
-  CEFTRIAXONE: SIGNIFICANT CHANGE UP
-  CIPROFLOXACIN: SIGNIFICANT CHANGE UP
-  GENTAMICIN: SIGNIFICANT CHANGE UP
-  TOBRAMYCIN: SIGNIFICANT CHANGE UP
-  TRIMETHOPRIM/SULFAMETHOXAZOLE: SIGNIFICANT CHANGE UP
CULTURE RESULTS: SIGNIFICANT CHANGE UP
METHOD TYPE: SIGNIFICANT CHANGE UP
ORGANISM # SPEC MICROSCOPIC CNT: SIGNIFICANT CHANGE UP
ORGANISM # SPEC MICROSCOPIC CNT: SIGNIFICANT CHANGE UP
SPECIMEN SOURCE: SIGNIFICANT CHANGE UP

## 2019-11-11 NOTE — CHART NOTE - NSCHARTNOTEFT_GEN_A_CORE
Lashonda Estevez  1962 11/11/2019      Patient is here for followup visit after  bilateral L3,L4,L5,S1 medial branch nerve injection done. Patient obtained 80% good pain relief from the injection but is limited in duration.  Continues to have low back pain. Denies any new areas of pain or injury.  There is good improvement in patient's function and ADL (activities of daily living) such as walking, sitting and doing work. Therefore, I will repeat similar L3-S1 medial branch nerve injection to determine if patient is considered a good candidate for RFA ablation of the medial branch nerves supplying the facet joints to obtain longer term pain relief.    We reviewed the patient's MRI findings again and discussed alternative treatment options.    We also discussed other forms of non-operative care including a home exercise program, physical therapy, chiropractic care, acupuncture, steroid injections, and medical management.      Review of systems: As above    ALLERGIES:   Allergen Reactions   • Contrast Media SEIZURES and Nausea & Vomiting   • Benadryl [Altaryl]      Swelling of eyes   • Latex      suspect latex allergy: rash with elastic knee brace   • Percocet [Oxycodone-Acetaminophen] RASH     Drug rash.   • Shrimp   (Food)    • Cefdinir GI UPSET     Heartburn  Tolerated cefepime 6/2018, tolerated Cefpodoxime 11/18   • Cephalexin GI UPSET     Heartburn  Tolerated cefepime 6/2018, tolerated Cefpodoxime 11/18   • Penicillins RASH     Mother informed the patient that she was allergic to it from childhood.  Patient does not recall reaction.  Tolerated cefepime 6/2018   • Meloxicam RASH   • Sulfate RASH     Drug rash, NOS.   • Zithromax [Azithromycin Dihydrate] RASH     Drug rash, NOS.       Current Outpatient Medications   Medication Sig Dispense Refill   • cetirizine (ZYRTEC) 10 MG tablet TAKE 1 TO 2 TABLETS BY MOUTH DAILY AS NEEDED 60 tablet 4   • linaclotide (LINZESS) 72 MCG Cap Take 72 mcg by mouth daily. Take in  Pt was planned to be discharged as pre the morning on empty stomach 30 capsule 11   • Abemaciclib 150 MG Tab Take 150 mg by mouth daily. 28 tablet 3   • fentaNYL (DURAGESIC) 25 MCG/HR patch Place 1 patch onto the skin every 48 hours. Use in addition to 100 mcg patch to equal 125 mcg 10 patch 0   • fentaNYL (DURAGESIC) 100 MCG/HR patch Place 1 patch onto the skin every 48 hours. Use in addition to 25 mcg patch to equal 125 mcg 10 patch 0   • HYDROmorphone (DILAUDID) 2 MG tablet Take 2- 3 tablets by mouth every 4-6 hours as needed for pain. 150 tablet 0   • venlafaxine XR (EFFEXOR XR) 150 MG 24 hr capsule Take 1 capsule by mouth daily. 30 capsule 0   • gabapentin (NEURONTIN) 300 MG capsule Take 1 capsule by mouth 2 times daily. 180 capsule 1   • naLOXone (NARCAN) 4 MG/0.1ML nasal spray Call 911. [place] the content of 1 device into 1 nostril. May repeat with 2nd device in alternate nostril if no response in 2-3 minutes. 2 each 0   • SUMAtriptan (IMITREX STATDOSE) 6 MG/0.5ML auto-injector Inject contents of one syringe (6mg) into the skin 2x daily. At least 1 hour between doses as needed 6 mL 11   • busPIRone (BUSPAR) 15 MG tablet Take 15 mg by mouth.     • QUEtiapine (SEROQUEL) 100 MG tablet Take 100 mg by mouth.     • montelukast (SINGULAIR) 10 MG tablet Take 1 tablet by mouth nightly. 90 tablet 1   • mometasone-formoterol (DULERA) 200-5 MCG/ACT inhaler Inhale 2 puffs into the lungs 2 times daily. 13 g 4   • rosuvastatin (CRESTOR) 10 MG tablet Take 1 tablet by mouth daily. 90 tablet 3   • nitrofurantoin, macrocrystal-monohydrate, (MACROBID) 100 MG capsule Take 1 capsule by mouth nightly. 90 capsule 1   • clonazePAM (KLONOPIN) 0.5 MG tablet Take 2 tablets by mouth nightly. 60 tablet 5   • butalbital-APAP-caffeine (FIORICET) -40 MG per tablet Take 1 tablet by mouth 3 times daily as needed for Pain. 30 tablet 0   • divalproex (DEPAKOTE ER) 250 MG 24 hr ER tablet Take 3 tablets (750 mg) by mouth nightly. 90 tablet 5   • levothyroxine (SYNTHROID,  LEVOTHROID) 88 MCG tablet Take 1 tablet by mouth daily. 90 tablet 1   • anastrozole (ARIMIDEX) 1 MG tablet Take 1 tablet by mouth daily. 30 tablet 3   • pantoprazole (PROTONIX) 40 MG tablet Take 1 tablet by mouth 2 times daily (before meals). 60 tablet 11   • Cholecalciferol (VITAMIN D-3) 1000 units Cap Take 1 capsule by mouth daily. 30 capsule 11   • phenazopyridine (PYRIDIUM) 100 MG tablet Take 1 tablet by mouth 3 times daily as needed for Pain. 10 tablet 0   • azelastine (ASTELIN) 0.1 % nasal spray Spray 1 spray in each nostril 2 times daily. 30 mL 3   • venlafaxine XR (EFFEXOR XR) 75 MG 24 hr capsule Take 1 capsule by mouth daily. 90 capsule 1   • prochlorperazine (COMPAZINE) 10 MG tablet Take 1 tablet by mouth every 6 hours as needed for Nausea. 30 tablet 3   • albuterol (VENTOLIN HFA) 108 (90 Base) MCG/ACT inhaler Inhale 1 puff into the lungs every 4 hours as needed for Shortness of Breath or Wheezing. 3 Inhaler 0   • fluticasone (FLONASE) 50 MCG/ACT nasal spray Spray 2 sprays in each nostril daily. 16 g 4   • olopatadine (PATADAY) 0.2 % ophthalmic solution Place 1 drop into both eyes daily. 2.5 mL 4   • EPINEPHrine (EPIPEN 2-BRYANNA) 0.3 MG/0.3ML auto-injector Inject 0.3 mLs into the muscle as needed for Anaphylaxis. 1 each 1   • Pseudoephedrine-Guaifenesin (MUCINEX D PO) Take by mouth nightly as needed.      • Vitamin D, Ergocalciferol, 07535 units capsule Take 1 capsule by mouth 1 day a week. For 12 weeks. 12 capsule 0   • blood glucose meter Test blood sugar 4 times daily as directed. Diagnosis: E11.9. Meter: One Touch Verio 1 kit 0   • blood glucose (ONE TOUCH TEST STRIPS) test strip Testing 1 times daily.  Dx: E11.9. One touch Verio Gold Strips. 100 strip 3   • blood glucose lancets Test blood sugar 4 times daily as directed. Diagnosis: . Diabeties: 360 each 5   • albuterol-ipratropium 2.5 mg/0.5 mg (DUONEB) 0.5-2.5 (3) MG/3ML nebulizer solution Take 3 mLs by nebulization every 6 hours as needed for  Pt was planned to be discharged during the day, but was complaining of feeling palpitations (please see day provider's note). Pt was offered to be monitored At first pt stated that she feels better and will f/u with Dr. Metcalf in [place] (in day provider's note). But after a few minuted pt Pt was planned to be discharged during the day, but was complaining of feeling palpitations (please see day provider's note). At first pt stated that she feels better and will f/u with Dr. Metcalf in [place] (in day provider's note). But after a few minuted pt decided to stay to be monitored on telemetry as per previous offer by day NP. Will d/w Wheezing. 360 mL 12   • nitrofurantoin, macrocrystal-monohydrate, (MACROBID) 100 MG capsule Take 1 capsule by mouth 2 times daily for 10 days. 14 capsule 0   • pantoprazole (PROTONIX) 40 MG tablet Take 1 tablet by mouth 2 times daily for 14 days. 28 tablet 0     No current facility-administered medications for this visit.        Past Medical History:   Diagnosis Date   • Anemia    • Anxiety    • Asthma     Followed by Dr. Robison.  Triggers include dusts, pollen.     • Breast CA (CMS/[facility]) 8/2016    Right   • Breast cancer (CMS/[facility]) 06/2018    metestatic breast cancer to bone and lung   • Cerebral infarction (CMS/[facility]) 2014    tia   • Chronic constipation    • Closed head injury 01/07/2019    fall at home- lost balance and fell to ground - no loss of consciousness  -left shoulder , right hip and bilateral knee pain along w/ head pain    • Depression    • Diabetes mellitus (CMS/[facility])    • Disorder of bone and cartilage, unspecified 11/23/2011   • Esophageal reflux    • Fibromyalgia    • H. pylori infection 7/28/2016   • Hyperlipidemia    • Hypothyroid    • IBS (irritable bowel syndrome)    • Inflammatory arthritis    • Migraine    • Multinodular goiter 2013   • Osteoporosis    • Recurrent UTI 03/12/2019    lrecurrent urinary tract infections with gross hematuria-Dr Cadet    • Rhinitis    • Seizures (CMS/[facility])    • Sleep apnea     cpap   • TIA (transient ischemic attack)    • Unspecified sinusitis (chronic)    • Vitamin D insufficiency    • Weakness generalized          Physical Examination:  Visit Vitals  Pulse 88   Ht 5' 3\" (1.6 m)   Wt 80.4 kg   BMI 31.40 kg/m²     Awake, alert, able to ambulate with cane as assistive device, antalgic gait.  Not in acute distress.  Neck- Supple, no limitation in range of motion, paracervical tenderness.  Heart- Regular rate and rhythm.  Respiratory- Lungs clear to auscultation.  Abdomen- Soft, nontender, no masses.  Back- facet joint and paraspinal tenderness lower lumbar areas, flexion  Pt was planned to be discharged during the day, but was complaining of feeling palpitations (please see day provider's note). At first pt stated that she feels better and will f/u with Dr. Metcalf in [place] (in day provider's note). But after a few minuted pt decided to stay to be monitored on telemetry as per previous offer by day NP. Pt denied CP, palpitations, diaphoresis, H/A, abdominal , back pain. Vital signs reviewed, HR 70-80's. Hospitalist on call was notified , that pt stayed and was transferred to tele for monitoring over the night .  PA on 2DSU updated .      Essie Estrella NP, #[id number] and extension of lumbar spine with more pain.  Extremities- No sensory or motor deficits, reflexes 2/4 symmetrical.    Assessment:    bilateral chronic low back pain  Lumbar spondylosis without myelopathy  Lumbar Facet joint pain    Lumbar facet joint arthropathy  History of recurrent metastatic breast CA    Plan:  I recommend repeat confirmatory bilateral L3-4,L4-5,L5-S1 facet joint medial branch nerves  under fluoroscopic guidance. Procedure to be done under moderate sedation and local anesthesia to reduce anxiety.    No refills of any medications needed today.    Patient is encouraged to perform stretching and exercise regimen as previously outlined in physical therapy.      Goals:  Reduce pain, improve function and quality of life, able to keep employment or perform daily activities of living with less pain as well as decrease the need for pain medications.    The patient agrees with the above outlined plan and denies having any unanswered questions at this time. Patient may contact our office if there are any further questions or concerns, patient may contact my office at anytime.     Greater than 50% of this time was spent counseling the patient and coordinating care. All questions were answered and patient understood and agreed with the treatment plan.    Doug Patrick md  Interventional Pain Management  Board certified and fellowship trained  Anesthesia and Pain Medicine

## 2019-11-18 ENCOUNTER — APPOINTMENT (OUTPATIENT)
Dept: THORACIC SURGERY | Facility: CLINIC | Age: 69
End: 2019-11-18

## 2019-11-27 ENCOUNTER — APPOINTMENT (OUTPATIENT)
Dept: HEART AND VASCULAR | Facility: CLINIC | Age: 69
End: 2019-11-27
Payer: MEDICARE

## 2019-11-27 VITALS
HEART RATE: 86 BPM | BODY MASS INDEX: 23.66 KG/M2 | OXYGEN SATURATION: 97 % | SYSTOLIC BLOOD PRESSURE: 150 MMHG | DIASTOLIC BLOOD PRESSURE: 70 MMHG | RESPIRATION RATE: 16 BRPM | HEIGHT: 65 IN | WEIGHT: 142 LBS | TEMPERATURE: 98.1 F

## 2019-11-27 DIAGNOSIS — J18.9 PNEUMONIA, UNSPECIFIED ORGANISM: ICD-10-CM

## 2019-11-27 DIAGNOSIS — Z87.440 PERSONAL HISTORY OF URINARY (TRACT) INFECTIONS: ICD-10-CM

## 2019-11-27 PROCEDURE — 99214 OFFICE O/P EST MOD 30 MIN: CPT

## 2019-11-27 PROCEDURE — 36415 COLL VENOUS BLD VENIPUNCTURE: CPT

## 2019-11-27 RX ORDER — TIZANIDINE 4 MG/1
4 TABLET ORAL EVERY 8 HOURS
Qty: 90 | Refills: 0 | Status: DISCONTINUED | COMMUNITY
Start: 2019-04-25 | End: 2019-11-27

## 2019-11-27 RX ORDER — ALBUTEROL SULFATE 90 UG/1
108 (90 BASE) AEROSOL, METERED RESPIRATORY (INHALATION)
Qty: 3 | Refills: 1 | Status: DISCONTINUED | OUTPATIENT
Start: 2018-10-22 | End: 2019-11-27

## 2019-11-27 RX ORDER — LIDOCAINE 5% 700 MG/1
5 PATCH TOPICAL
Qty: 1 | Refills: 5 | Status: DISCONTINUED | COMMUNITY
Start: 2019-04-25 | End: 2019-11-27

## 2019-11-27 RX ORDER — CEFDINIR 300 MG/1
300 CAPSULE ORAL
Qty: 2 | Refills: 0 | Status: DISCONTINUED | COMMUNITY
Start: 2019-10-07 | End: 2019-11-27

## 2019-11-27 RX ORDER — AZITHROMYCIN 500 MG/1
500 TABLET, FILM COATED ORAL DAILY
Qty: 7 | Refills: 1 | Status: DISCONTINUED | COMMUNITY
Start: 2019-05-02 | End: 2019-11-27

## 2019-11-27 RX ORDER — MOXIFLOXACIN HYDROCHLORIDE TABLETS, 400 MG 400 MG/1
400 TABLET, FILM COATED ORAL DAILY
Qty: 7 | Refills: 0 | Status: DISCONTINUED | COMMUNITY
Start: 2019-07-03 | End: 2019-11-27

## 2019-11-27 RX ORDER — BACLOFEN 5 MG/1
5 TABLET ORAL
Qty: 120 | Refills: 5 | Status: DISCONTINUED | COMMUNITY
Start: 2019-06-11 | End: 2019-11-27

## 2019-11-27 RX ORDER — AZITHROMYCIN 500 MG/1
500 TABLET, FILM COATED ORAL DAILY
Qty: 7 | Refills: 1 | Status: DISCONTINUED | COMMUNITY
Start: 2019-07-18 | End: 2019-11-27

## 2019-11-27 NOTE — PHYSICAL EXAM
[General Appearance - Well Developed] : well developed [Normal Appearance] : normal appearance [Well Groomed] : well groomed [General Appearance - Well Nourished] : well nourished [No Deformities] : no deformities [General Appearance - In No Acute Distress] : no acute distress [Normal Conjunctiva] : the conjunctiva exhibited no abnormalities [Eyelids - No Xanthelasma] : the eyelids demonstrated no xanthelasmas [Normal Oral Mucosa] : normal oral mucosa [No Oral Pallor] : no oral pallor [No Oral Cyanosis] : no oral cyanosis [Normal Jugular Venous A Waves Present] : normal jugular venous A waves present [Normal Jugular Venous V Waves Present] : normal jugular venous V waves present [No Jugular Venous Espino A Waves] : no jugular venous espino A waves [Respiration, Rhythm And Depth] : normal respiratory rhythm and effort [Exaggerated Use Of Accessory Muscles For Inspiration] : no accessory muscle use [Heart Rate And Rhythm] : heart rate and rhythm were normal [Heart Sounds] : normal S1 and S2 [Edema] : no peripheral edema present [Systolic grade ___/6] : A grade [unfilled]/6 systolic murmur was heard. [Bowel Sounds] : normal bowel sounds [Abdomen Tenderness] : non-tender [Abnormal Walk] : normal gait [Gait - Sufficient For Exercise Testing] : the gait was sufficient for exercise testing [Nail Clubbing] : no clubbing of the fingernails [Cyanosis, Localized] : no localized cyanosis [Petechial Hemorrhages (___cm)] : no petechial hemorrhages [Nail Splinter Hemorrhages] : no splinter hemorrhages of the nails [Fingers Osler's Nodes] : Osler's nodes were not seenon the fingers [Skin Color & Pigmentation] : normal skin color and pigmentation [] : no rash [No Venous Stasis] : no venous stasis [Skin Lesions] : no skin lesions [No Skin Ulcers] : no skin ulcer [No Xanthoma] : no  xanthoma was observed [Oriented To Time, Place, And Person] : oriented to person, place, and time [Impaired Insight] : insight and judgment were intact [Affect] : the affect was normal [Mood] : the mood was normal [Memory Recent] : recent memory was not impaired [Memory Remote] : remote memory was not impaired [FreeTextEntry1] : mildly decreased skin turgor

## 2019-11-27 NOTE — REVIEW OF SYSTEMS
[Feeling Fatigued] : feeling fatigued [Dyspnea on exertion] : dyspnea during exertion [Cough] : cough [Negative] : Heme/Lymph [Recent Weight Gain (___ Lbs)] : no recent weight gain [Recent Weight Loss (___ Lbs)] : no recent weight loss [Wheezing] : no wheezing [Coughing Up Blood] : no hemoptysis [FreeTextEntry1] : genital ulcer

## 2019-11-27 NOTE — REASON FOR VISIT
[Follow-Up - Clinic] : a clinic follow-up of [Dyspnea] : dyspnea [Fatigue] : feeling tired (fatigue) [FreeTextEntry1] : 68 yo female with hx of seizure ,  hx of rectal cancer s/p recent completion of radiation and chemotherapy and  + PET scan showing residual disease  which necessitated  surgery in July 2018 . Residual rectal cancer removed with  all 14 lymph nodes negative for disease. Ostomy bag in place. Brain MRI was negative for metastatic disease. \par \par Chronic asthma with respiratory infections and tracheomalacia that was surgically repaired last year . Has nonvalvular afib and is on Eliquis   without bleeding. \par \par There is  hx of moderate severity  chronic aortic regurgitation. \par \par

## 2019-11-27 NOTE — HISTORY OF PRESENT ILLNESS
[FreeTextEntry1] : Recently worsening cough and deterioration of lung function  prompted  sputum cultures/bronchoscopy \par and  two organisms were identified: acinetobacter  and pseudomonas species both susceptible to tobramycin which she has been taking as 300mg  inhalation bid . However, after several days, she feels no better, has significant sputum with dark purulent phlegm  and dyspnea\par \par Also notes left labial tenderness/swelling  past several days that she is treating with Witch hazel \par \par She is on prednisone 8 mg daily  and her blood sugars have improved with Hgba1c below 7%

## 2019-11-27 NOTE — ASSESSMENT
[FreeTextEntry1] : stable Aortic regurgitation without CHF\par \par dyspnea with  multiple organisms  causing worsening sputum \par \par acinetobacter  and pseudomonas  \par \par type 2 diabetes\par \par possible genital herpes

## 2019-11-29 LAB
ALBUMIN SERPL ELPH-MCNC: 4.5 G/DL
ALP BLD-CCNC: 91 U/L
ALT SERPL-CCNC: 17 U/L
ANION GAP SERPL CALC-SCNC: 25 MMOL/L
AST SERPL-CCNC: 16 U/L
BASOPHILS # BLD AUTO: 0.03 K/UL
BASOPHILS NFR BLD AUTO: 0.2 %
BILIRUB SERPL-MCNC: 0.2 MG/DL
BUN SERPL-MCNC: 20 MG/DL
CALCIUM SERPL-MCNC: 9.8 MG/DL
CHLORIDE SERPL-SCNC: 99 MMOL/L
CO2 SERPL-SCNC: 17 MMOL/L
CREAT SERPL-MCNC: 0.73 MG/DL
EOSINOPHIL # BLD AUTO: 0.04 K/UL
EOSINOPHIL NFR BLD AUTO: 0.3 %
HCT VFR BLD CALC: 47.1 %
HGB BLD-MCNC: 13.7 G/DL
HSV 1+2 IGG SER IA-IMP: POSITIVE
HSV 1+2 IGG SER IA-IMP: POSITIVE
HSV1 IGG SER QL: 50.3 INDEX
HSV2 IGG SER QL: >23.6 INDEX
IMM GRANULOCYTES NFR BLD AUTO: 0.8 %
LYMPHOCYTES # BLD AUTO: 0.88 K/UL
LYMPHOCYTES NFR BLD AUTO: 6.2 %
MAN DIFF?: NORMAL
MCHC RBC-ENTMCNC: 23.3 PG
MCHC RBC-ENTMCNC: 29.1 GM/DL
MCV RBC AUTO: 80 FL
MONOCYTES # BLD AUTO: 0.59 K/UL
MONOCYTES NFR BLD AUTO: 4.2 %
NEUTROPHILS # BLD AUTO: 12.55 K/UL
NEUTROPHILS NFR BLD AUTO: 88.3 %
PLATELET # BLD AUTO: 284 K/UL
POTASSIUM SERPL-SCNC: 4.7 MMOL/L
PROT SERPL-MCNC: 6.9 G/DL
RBC # BLD: 5.89 M/UL
RBC # FLD: 19.2 %
SODIUM SERPL-SCNC: 141 MMOL/L
WBC # FLD AUTO: 14.21 K/UL

## 2019-12-02 LAB
HSV1 IGM SER QL: NORMAL TITER
HSV2 AB FLD-ACNC: NORMAL TITER

## 2019-12-03 ENCOUNTER — APPOINTMENT (OUTPATIENT)
Dept: PULMONOLOGY | Facility: CLINIC | Age: 69
End: 2019-12-03
Payer: MEDICARE

## 2019-12-03 VITALS
SYSTOLIC BLOOD PRESSURE: 150 MMHG | RESPIRATION RATE: 17 BRPM | HEART RATE: 106 BPM | OXYGEN SATURATION: 95 % | WEIGHT: 142 LBS | DIASTOLIC BLOOD PRESSURE: 83 MMHG | HEIGHT: 65 IN | BODY MASS INDEX: 23.66 KG/M2

## 2019-12-03 DIAGNOSIS — N76.6 ULCERATION OF VULVA: ICD-10-CM

## 2019-12-03 PROCEDURE — 99214 OFFICE O/P EST MOD 30 MIN: CPT | Mod: 25

## 2019-12-03 NOTE — PHYSICAL EXAM
[Normal Appearance] : normal appearance [General Appearance - Well Developed] : well developed [No Deformities] : no deformities [Well Groomed] : well groomed [General Appearance - Well Nourished] : well nourished [Eyelids - No Xanthelasma] : the eyelids demonstrated no xanthelasmas [General Appearance - In No Acute Distress] : no acute distress [Normal Conjunctiva] : the conjunctiva exhibited no abnormalities [Normal Oropharynx] : normal oropharynx [Neck Appearance] : the appearance of the neck was normal [Thyroid Diffuse Enlargement] : the thyroid was not enlarged [Neck Cervical Mass (___cm)] : no neck mass was observed [Jugular Venous Distention Increased] : there was no jugular-venous distention [Thyroid Nodule] : there were no palpable thyroid nodules [Heart Sounds] : normal S1 and S2 [Heart Rate And Rhythm] : heart rate and rhythm were normal [Respiration, Rhythm And Depth] : normal respiratory rhythm and effort [Exaggerated Use Of Accessory Muscles For Inspiration] : no accessory muscle use [Abdomen Soft] : soft [Abdomen Tenderness] : non-tender [Abdomen Mass (___ Cm)] : no abdominal mass palpated [Abnormal Walk] : normal gait [Gait - Sufficient For Exercise Testing] : the gait was sufficient for exercise testing [Nail Clubbing] : no clubbing of the fingernails [Petechial Hemorrhages (___cm)] : no petechial hemorrhages [Skin Color & Pigmentation] : normal skin color and pigmentation [Cyanosis, Localized] : no localized cyanosis [No Venous Stasis] : no venous stasis [Skin Lesions] : no skin lesions [] : no rash [No Xanthoma] : no  xanthoma was observed [No Skin Ulcers] : no skin ulcer [No Focal Deficits] : no focal deficits [Sensation] : the sensory exam was normal to light touch and pinprick [Deep Tendon Reflexes (DTR)] : deep tendon reflexes were 2+ and symmetric [Impaired Insight] : insight and judgment were intact [Oriented To Time, Place, And Person] : oriented to person, place, and time [Affect] : the affect was normal [III] : III [Kyphosis] : kyphosis [FreeTextEntry1] : I:E ratio 1:3, expiratory wheezes bilaterally

## 2019-12-03 NOTE — ADDENDUM
[FreeTextEntry1] : Documented by Dejuan Cope acting as a scribe for Dr. Eulalio Allison on 12/03/2019.\par \par All medical record entries made by the Scribe were at my, Dr. Eulalio Allison's, direction and personally dictated by me on 12/03/2019. I have reviewed the chart and agree that the record accurately reflects my personal performance of the history, physical exam, assessment and plan. I have also personally directed, reviewed, and agree with the discharge instructions.

## 2019-12-03 NOTE — HISTORY OF PRESENT ILLNESS
[FreeTextEntry1] : Ms. Bloom is a 69 year old female with a history of abnormal CXR/chest CT, allergic rhinitis, severe persistent asthma, bronchiectasis, GERD, COPD, recurrent PNA, PND, s/p tracheoplasty, TBM, and SOB presenting to the office today for a follow up visit. Her chief complaint is SOB.\par -she notes she just started Dupixent 2 weeks ago, and is due for her 2nd dose today\par -she notes she had a CT scan and a bronchoscopy, and Dr. Zapien wouldn't operate on her with her 2 lung infections\par -she was placed on 2 ABx by Dr. Engel (Ceftin and inhaled Tobramycin)\par -she notes she is still getting SOB when she walks,\par -she notes her O2 sat is 97%\par -she notes her shoulder began hurting her again\par -she reports she is on 8 mg of Medrol\par -she reports she has sputum production, and is turning yellow/green\par -she reports she has difficulty swallowing occasionally\par -she notes her breathing is crackling\par -she reports her sinuses are somewhat drippy and congested\par -she reports having back and right shoulder pain\par -she reports her "bubbling" breathing wakes her up at night\par -she reports feeling lightheaded occasionally\par -she states she tries not to sleep during the day, since she will be up at night afterward\par -she notes she had blood work done with Dr. Engel.\par -she denies any chest pain, chest pressure, diarrhea, constipation, sour taste in the mouth, heartburn, reflux

## 2019-12-03 NOTE — REVIEW OF SYSTEMS
[Negative] : Pulmonary Hypertension [Nasal Congestion] : nasal congestion [Postnasal Drip] : postnasal drip [Sinus Problems] : sinus problems [Cough] : cough [Sputum] : sputum  [Dyspnea] : dyspnea [Wheezing] : wheezing [Nasal Discharge] : nasal discharge [Dysphagia] : dysphagia [Back Pain] : ~T back pain [Arthralgias] : arthralgias [Dizziness] : dizziness [As Noted in HPI] : as noted in HPI [Chest Discomfort] : no chest discomfort [Heartburn] : no heartburn [Reflux] : no reflux [Constipation] : no constipation [Diarrhea] : no diarrhea [Difficulty Initiating Sleep] : no difficulty falling asleep [Difficulty Maintaining Sleep] : no difficulty maintaining sleep

## 2019-12-03 NOTE — PROCEDURE
[FreeTextEntry1] : Chest CT (10.22.19) reveals Tracheobronchomalacia. \par Interval significant decrease of subpleural nodules within the right middle lobe and right lower lobe.\par No new or enlarging pulmonary nodule. \par Again noted, the bones are heterogeneous with multiple sclerotic foci within the vertebral bodies, \par indeterminant. Further evaluation can be performed with both bone scan.\par \par CT Abdomen and Pelvis (10.22.19) reveals No evidence of metastatic disease in the abdomen/pelvis. \par Pancreatic tail cysts measuring up to 2.1 cm without significant change. \par For evaluation of the chest, please refer to dedicated chest CT report same day.

## 2019-12-03 NOTE — ASSESSMENT
[FreeTextEntry1] : Ms. Bloom is a 68 year old female with a history of AVDz, asthma, allergy, GERD, TBM, s/p pneumonia, who presents still symptomatic , on 2 ABx (Ceftin / inhaled Tobramycin)\par \par Her chronic SOB which is multifactorial due to:\par - tracheomalacia (s/p tracheoplasty with residual frequent mucus production, though patient is non-compliant with vest therapy)\par - chronic bronchitis\par - asthma\par - allergic rhinitis\par - GERD\par - poor breathing mechanics \par - CAD/AV disease\par \par \par problem 1a: severe persistent asthma -(steroid dependent)\par - continue Medrol 8 mg QAM & 8 mg QHS for 1 week\par -continue to use albuterol via the nebulizer QID \par -followed by Mucomyst QiD\par -followed by the acapella device/ chest vest therapy \par -followed by Perforomist via the nebulizer BID\par -followed by Budesonide 0.5% via the nebulizer BID \par -continue to use Breo Ellipta 200 at 1 inhalation QD \par -continue to use Spiriva 1 inhalation QD\par -failed Xolair 225 injection; follow up injections every 2 weeks - Dupixent initiated (shot number 2 today - 12.3.19)\par -continue to use Accolate 20 mg BID\par -Information sheet given about prednisone to the patient to be reviewed, this medication is never to be used without consulting the prescribing physician. Proper dietary restraint is necessary specifically salt containing foods, if any reaction may occur should be reported. \par -Asthma is believed to be caused by inherited (genetic) and environmental factor, but its exact cause is unknown. Asthma may be triggered by allergens, lung infections, or irritants in the air. Asthma triggers are different for each person\par -Inhaler technique reviewed as well as oral hygiene techniques reviewed with patient. Avoidance of cold air, extremes of temperature, rescue inhaler should be used before exercise. Order of medication reviewed with patient. Recommended use of a cool mist humidifier in the bedroom. \par \par problem 1b: bronchitis \par -add Cefdinir 300 mg BID (20 pills)\par \par problem 2: tracheomalacia, residual bronchomalacia \par -s/p tracheoplasty with Dr. [place] Zapien\par -continue to follow up \par -get f/u Dynamic chest CT 10/2019\par \par problem 3: chronic bronchitis and mucus clearance\par -continue to use acapella device multiple times daily\par -recommended to use chest vest therapy multiple times daily \par -she is being sent for sputum cultures \par Patient has a chronic cough greater than 6 months, tried and failed manual chest physiotherapy at home, no skilled caregiver available at home to perform manual CPT, tried and failed acapella vibratory physiotherapy, and recommended chest vest therapy \par \par Problem 3A: Multiple infections\par -Continue Ceftin for 10 days\par -Continue Tobramycin BID for 1 month\par -Complete follow up Sputum culture after completing ABx\par \par problem 4: GERD\par -continue to use Protonix 40 mg before breakfast\par -continue Baclofen 5 mg pre-meal and QHS\par -Rule of 2s: avoid eating too much, eating too late, eating too spicy, eating two hours before bed\par -Things to avoid including overeating, spicy foods, tight clothing, eating within three hours of bed, this list is not all inclusive. \par -For treatment of reflux, possible options discussed including diet control, H2 blockers, PPIs, as well as coating motility agents discussed as treatment options. Timing of meals and proximity of last meal to sleep were discussed. If symptoms persist, a formal gastrointestinal evaluation is needed. \par \par problem 5: allergic rhinitis \par -continue to use nasal saline\par -continue to use Xyzal 5 mg before bed\par -Environmental measures for allergies were encouraged including mattress and pillow cover, air purifier, and environmental controls. \par \par problem 6: hx of abnormal aortic valve / cardiac health\par -continue to follow up with Dr. Leahy, AV Disease, AF\par -echocadiogram in June 2018  (Tosin)\par \par problem 7: colon cancer\par -s/p radiation therapy, surgery \par -continue to use chemotherapy follow up with Dr. King or Dr. Mario\par \par problem 8: poor breathing mechanics\par -Proper breathing techniques were reviewed with an emphasis of exhalation. Patient instructed to breath in for 1 second and out for four seconds. Patient was encouraged to not talk while walking.\par \par problem 9: immunodeficiency\par - -Due to the fact that this pt has had more infections than would be expected and immunological blood work is indicated this would include: IgG subclasses, quantitative immunoglobulins, Strep pneumoniae titers as well as Vitamin D levels. Based on this blood work we will be able to decide where the pt needs additional pneumococcal vaccine either Prevnar 13 or pneumovax. Immunology evaluation will also be potentially indicated.\par \par problem 10: r/o immunodeficiency (on Medrol)\par -Due to the fact that this pt has had more infections than would be expected and immunological blood work is indicated this would include: IgG subclasses, quantitative immunoglobulins, Strep pneumoniae titers as well as Vitamin D levels.\par -Based on this blood work we will be able to decide where the pt needs additional pneumococcal vaccine either Prevnar 13 or pneumovax. Immunology evaluation will also be potentially indicated. \par \par problem 11: abnormal chest CT- ? new nodule- likely inflammation \par - F/u PET/CT 4/2019- if changed Bx (Rupali); follow up and re-evaluate as per Rupali\par -questionable DIEUDONNE or HERMELINDO, all sputum is negative \par -CAT scans are the only radiological modality to identify abnormalities w/in the lungs with regards to nodules/masses/lymph nodes. Risks, benefits were reviewed in detail. The guidelines for abnormalities include follow up CT scans at various intervals which could range from 6 weeks to 1 year intervals. If there is a change for the worse then consideration for a biopsy will be considered if you are a candidate. Second opinion evaluation with a thoracic surgeon or an interventional radiologist could be offered. 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 It sometimes develops following a URI, herpes zoster outbreak in pharynx or thyroid or cervical spine injury. However, many patients have no identifiable antecedent explanation. \par \par problem 13:  health maintenance \par -recommended yearly flu shot after October 15 (2019)\par -recommended strep pneumonia vaccines: Prevnar-13 vaccine, followed by Pneumo vaccine 23 one year following\par -recommended early intervention for URIs\par -recommended regular osteoporosis evaluations\par -recommended early dermatological evaluations\par -recommended after the age of 50 to the age of 70, colonoscopy every 5 years \par \par \par F/U in 6 weeks - SPI / NIOX\par She is encouraged to call with any changes, concerns, or questions.

## 2019-12-04 LAB
CULTURE RESULTS: SIGNIFICANT CHANGE UP
SPECIMEN SOURCE: SIGNIFICANT CHANGE UP

## 2019-12-06 ENCOUNTER — MEDICATION RENEWAL (OUTPATIENT)
Age: 69
End: 2019-12-06

## 2019-12-07 ENCOUNTER — INPATIENT (INPATIENT)
Facility: HOSPITAL | Age: 69
LOS: 4 days | Discharge: HOME | DRG: 872 | End: 2019-12-12
Attending: HOSPITALIST | Admitting: HOSPITALIST
Payer: MEDICARE

## 2019-12-07 VITALS
RESPIRATION RATE: 18 BRPM | OXYGEN SATURATION: 100 % | TEMPERATURE: 100 F | HEIGHT: 67 IN | HEART RATE: 64 BPM | WEIGHT: 138.89 LBS | DIASTOLIC BLOOD PRESSURE: 84 MMHG | SYSTOLIC BLOOD PRESSURE: 144 MMHG

## 2019-12-07 DIAGNOSIS — J18.9 PNEUMONIA, UNSPECIFIED ORGANISM: ICD-10-CM

## 2019-12-07 DIAGNOSIS — Z98.89 OTHER SPECIFIED POSTPROCEDURAL STATES: Chronic | ICD-10-CM

## 2019-12-07 DIAGNOSIS — I48.91 UNSPECIFIED ATRIAL FIBRILLATION: ICD-10-CM

## 2019-12-07 DIAGNOSIS — C19 MALIGNANT NEOPLASM OF RECTOSIGMOID JUNCTION: ICD-10-CM

## 2019-12-07 DIAGNOSIS — J39.8 OTHER SPECIFIED DISEASES OF UPPER RESPIRATORY TRACT: ICD-10-CM

## 2019-12-07 DIAGNOSIS — K62.5 HEMORRHAGE OF ANUS AND RECTUM: Chronic | ICD-10-CM

## 2019-12-07 DIAGNOSIS — E27.40 UNSPECIFIED ADRENOCORTICAL INSUFFICIENCY: ICD-10-CM

## 2019-12-07 DIAGNOSIS — Z29.9 ENCOUNTER FOR PROPHYLACTIC MEASURES, UNSPECIFIED: ICD-10-CM

## 2019-12-07 DIAGNOSIS — J10.1 INFLUENZA DUE TO OTHER IDENTIFIED INFLUENZA VIRUS WITH OTHER RESPIRATORY MANIFESTATIONS: ICD-10-CM

## 2019-12-07 DIAGNOSIS — J45.909 UNSPECIFIED ASTHMA, UNCOMPLICATED: ICD-10-CM

## 2019-12-07 DIAGNOSIS — Z96.653 PRESENCE OF ARTIFICIAL KNEE JOINT, BILATERAL: Chronic | ICD-10-CM

## 2019-12-07 DIAGNOSIS — Z98.890 OTHER SPECIFIED POSTPROCEDURAL STATES: Chronic | ICD-10-CM

## 2019-12-07 DIAGNOSIS — H05.352 EXOSTOSIS OF LEFT ORBIT: Chronic | ICD-10-CM

## 2019-12-07 DIAGNOSIS — E11.9 TYPE 2 DIABETES MELLITUS WITHOUT COMPLICATIONS: ICD-10-CM

## 2019-12-07 DIAGNOSIS — Z87.09 PERSONAL HISTORY OF OTHER DISEASES OF THE RESPIRATORY SYSTEM: Chronic | ICD-10-CM

## 2019-12-07 LAB
ALBUMIN SERPL ELPH-MCNC: 4 G/DL — SIGNIFICANT CHANGE UP (ref 3.3–5)
ALP SERPL-CCNC: 75 U/L — SIGNIFICANT CHANGE UP (ref 40–120)
ALT FLD-CCNC: 18 U/L — SIGNIFICANT CHANGE UP (ref 10–45)
ANION GAP SERPL CALC-SCNC: 14 MMOL/L — SIGNIFICANT CHANGE UP (ref 5–17)
APPEARANCE UR: CLEAR — SIGNIFICANT CHANGE UP
APTT BLD: 28.5 SEC — SIGNIFICANT CHANGE UP (ref 27.5–36.3)
AST SERPL-CCNC: 17 U/L — SIGNIFICANT CHANGE UP (ref 10–40)
BACTERIA # UR AUTO: NEGATIVE — SIGNIFICANT CHANGE UP
BASE EXCESS BLDV CALC-SCNC: 4 MMOL/L — HIGH (ref -2–2)
BASOPHILS # BLD AUTO: 0.01 K/UL — SIGNIFICANT CHANGE UP (ref 0–0.2)
BASOPHILS NFR BLD AUTO: 0.1 % — SIGNIFICANT CHANGE UP (ref 0–2)
BILIRUB SERPL-MCNC: 0.2 MG/DL — SIGNIFICANT CHANGE UP (ref 0.2–1.2)
BILIRUB UR-MCNC: NEGATIVE — SIGNIFICANT CHANGE UP
BUN SERPL-MCNC: 6 MG/DL — LOW (ref 7–23)
CA-I SERPL-SCNC: 1.18 MMOL/L — SIGNIFICANT CHANGE UP (ref 1.12–1.3)
CALCIUM SERPL-MCNC: 8.9 MG/DL — SIGNIFICANT CHANGE UP (ref 8.4–10.5)
CHLORIDE BLDV-SCNC: 102 MMOL/L — SIGNIFICANT CHANGE UP (ref 96–108)
CHLORIDE SERPL-SCNC: 100 MMOL/L — SIGNIFICANT CHANGE UP (ref 96–108)
CO2 BLDV-SCNC: 31 MMOL/L — HIGH (ref 22–30)
CO2 SERPL-SCNC: 26 MMOL/L — SIGNIFICANT CHANGE UP (ref 22–31)
COLOR SPEC: SIGNIFICANT CHANGE UP
CREAT SERPL-MCNC: 0.71 MG/DL — SIGNIFICANT CHANGE UP (ref 0.5–1.3)
DIFF PNL FLD: NEGATIVE — SIGNIFICANT CHANGE UP
EOSINOPHIL # BLD AUTO: 0.03 K/UL — SIGNIFICANT CHANGE UP (ref 0–0.5)
EOSINOPHIL NFR BLD AUTO: 0.3 % — SIGNIFICANT CHANGE UP (ref 0–6)
EPI CELLS # UR: 1 /HPF — SIGNIFICANT CHANGE UP
FLU A RESULT: DETECTED
FLU A RESULT: DETECTED
FLUAV AG NPH QL: DETECTED
FLUBV AG NPH QL: SIGNIFICANT CHANGE UP
GAS PNL BLDV: 141 MMOL/L — SIGNIFICANT CHANGE UP (ref 135–145)
GAS PNL BLDV: SIGNIFICANT CHANGE UP
GAS PNL BLDV: SIGNIFICANT CHANGE UP
GLUCOSE BLDC GLUCOMTR-MCNC: 103 MG/DL — HIGH (ref 70–99)
GLUCOSE BLDV-MCNC: 172 MG/DL — HIGH (ref 70–99)
GLUCOSE SERPL-MCNC: 154 MG/DL — HIGH (ref 70–99)
GLUCOSE UR QL: NEGATIVE — SIGNIFICANT CHANGE UP
HCO3 BLDV-SCNC: 30 MMOL/L — HIGH (ref 21–29)
HCT VFR BLD CALC: 39.5 % — SIGNIFICANT CHANGE UP (ref 34.5–45)
HCT VFR BLDA CALC: 39 % — SIGNIFICANT CHANGE UP (ref 39–50)
HGB BLD CALC-MCNC: 12.6 G/DL — SIGNIFICANT CHANGE UP (ref 11.5–15.5)
HGB BLD-MCNC: 12.4 G/DL — SIGNIFICANT CHANGE UP (ref 11.5–15.5)
HYALINE CASTS # UR AUTO: 0 /LPF — SIGNIFICANT CHANGE UP (ref 0–2)
IMM GRANULOCYTES NFR BLD AUTO: 0.5 % — SIGNIFICANT CHANGE UP (ref 0–1.5)
INR BLD: 1.09 RATIO — SIGNIFICANT CHANGE UP (ref 0.88–1.16)
KETONES UR-MCNC: NEGATIVE — SIGNIFICANT CHANGE UP
LACTATE BLDV-MCNC: 1.6 MMOL/L — SIGNIFICANT CHANGE UP (ref 0.7–2)
LEUKOCYTE ESTERASE UR-ACNC: NEGATIVE — SIGNIFICANT CHANGE UP
LYMPHOCYTES # BLD AUTO: 0.7 K/UL — LOW (ref 1–3.3)
LYMPHOCYTES # BLD AUTO: 7 % — LOW (ref 13–44)
MCHC RBC-ENTMCNC: 23.5 PG — LOW (ref 27–34)
MCHC RBC-ENTMCNC: 31.4 GM/DL — LOW (ref 32–36)
MCV RBC AUTO: 75 FL — LOW (ref 80–100)
MONOCYTES # BLD AUTO: 0.82 K/UL — SIGNIFICANT CHANGE UP (ref 0–0.9)
MONOCYTES NFR BLD AUTO: 8.2 % — SIGNIFICANT CHANGE UP (ref 2–14)
NEUTROPHILS # BLD AUTO: 8.34 K/UL — HIGH (ref 1.8–7.4)
NEUTROPHILS NFR BLD AUTO: 83.9 % — HIGH (ref 43–77)
NITRITE UR-MCNC: NEGATIVE — SIGNIFICANT CHANGE UP
NRBC # BLD: 0 /100 WBCS — SIGNIFICANT CHANGE UP (ref 0–0)
PCO2 BLDV: 51 MMHG — HIGH (ref 35–50)
PH BLDV: 7.38 — SIGNIFICANT CHANGE UP (ref 7.35–7.45)
PH UR: 8 — SIGNIFICANT CHANGE UP (ref 5–8)
PLATELET # BLD AUTO: 238 K/UL — SIGNIFICANT CHANGE UP (ref 150–400)
PO2 BLDV: 44 MMHG — SIGNIFICANT CHANGE UP (ref 25–45)
POTASSIUM BLDV-SCNC: 3.6 MMOL/L — SIGNIFICANT CHANGE UP (ref 3.5–5.3)
POTASSIUM SERPL-MCNC: 3.8 MMOL/L — SIGNIFICANT CHANGE UP (ref 3.5–5.3)
POTASSIUM SERPL-SCNC: 3.8 MMOL/L — SIGNIFICANT CHANGE UP (ref 3.5–5.3)
PROT SERPL-MCNC: 6.7 G/DL — SIGNIFICANT CHANGE UP (ref 6–8.3)
PROT UR-MCNC: ABNORMAL
PROTHROM AB SERPL-ACNC: 12.6 SEC — SIGNIFICANT CHANGE UP (ref 10–12.9)
RBC # BLD: 5.27 M/UL — HIGH (ref 3.8–5.2)
RBC # FLD: 17.2 % — HIGH (ref 10.3–14.5)
RBC CASTS # UR COMP ASSIST: 6 /HPF — HIGH (ref 0–4)
RSV RESULT: SIGNIFICANT CHANGE UP
RSV RNA RESP QL NAA+PROBE: SIGNIFICANT CHANGE UP
SAO2 % BLDV: 75 % — SIGNIFICANT CHANGE UP (ref 67–88)
SODIUM SERPL-SCNC: 140 MMOL/L — SIGNIFICANT CHANGE UP (ref 135–145)
SP GR SPEC: 1.02 — SIGNIFICANT CHANGE UP (ref 1.01–1.02)
TROPONIN T, HIGH SENSITIVITY RESULT: 16 NG/L — SIGNIFICANT CHANGE UP (ref 0–51)
TROPONIN T, HIGH SENSITIVITY RESULT: 17 NG/L — SIGNIFICANT CHANGE UP (ref 0–51)
UROBILINOGEN FLD QL: NEGATIVE — SIGNIFICANT CHANGE UP
WBC # BLD: 9.95 K/UL — SIGNIFICANT CHANGE UP (ref 3.8–10.5)
WBC # FLD AUTO: 9.95 K/UL — SIGNIFICANT CHANGE UP (ref 3.8–10.5)
WBC UR QL: 4 /HPF — SIGNIFICANT CHANGE UP (ref 0–5)

## 2019-12-07 PROCEDURE — 71250 CT THORAX DX C-: CPT | Mod: 26

## 2019-12-07 PROCEDURE — 71045 X-RAY EXAM CHEST 1 VIEW: CPT | Mod: 26

## 2019-12-07 PROCEDURE — 74176 CT ABD & PELVIS W/O CONTRAST: CPT | Mod: 26

## 2019-12-07 PROCEDURE — 93010 ELECTROCARDIOGRAM REPORT: CPT

## 2019-12-07 PROCEDURE — 99223 1ST HOSP IP/OBS HIGH 75: CPT | Mod: GC

## 2019-12-07 PROCEDURE — 99285 EMERGENCY DEPT VISIT HI MDM: CPT | Mod: GC

## 2019-12-07 RX ORDER — TIOTROPIUM BROMIDE 18 UG/1
1 CAPSULE ORAL; RESPIRATORY (INHALATION) DAILY
Refills: 0 | Status: DISCONTINUED | OUTPATIENT
Start: 2019-12-07 | End: 2019-12-12

## 2019-12-07 RX ORDER — ATORVASTATIN CALCIUM 80 MG/1
20 TABLET, FILM COATED ORAL AT BEDTIME
Refills: 0 | Status: DISCONTINUED | OUTPATIENT
Start: 2019-12-07 | End: 2019-12-12

## 2019-12-07 RX ORDER — INSULIN GLARGINE 100 [IU]/ML
12 INJECTION, SOLUTION SUBCUTANEOUS
Qty: 0 | Refills: 0 | DISCHARGE

## 2019-12-07 RX ORDER — DIGOXIN 250 MCG
0.25 TABLET ORAL DAILY
Refills: 0 | Status: DISCONTINUED | OUTPATIENT
Start: 2019-12-07 | End: 2019-12-12

## 2019-12-07 RX ORDER — ACETAMINOPHEN 500 MG
650 TABLET ORAL ONCE
Refills: 0 | Status: COMPLETED | OUTPATIENT
Start: 2019-12-07 | End: 2019-12-07

## 2019-12-07 RX ORDER — SODIUM CHLORIDE 9 MG/ML
1000 INJECTION, SOLUTION INTRAVENOUS
Refills: 0 | Status: DISCONTINUED | OUTPATIENT
Start: 2019-12-07 | End: 2019-12-07

## 2019-12-07 RX ORDER — INSULIN GLARGINE 100 [IU]/ML
7 INJECTION, SOLUTION SUBCUTANEOUS
Refills: 0 | Status: DISCONTINUED | OUTPATIENT
Start: 2019-12-07 | End: 2019-12-10

## 2019-12-07 RX ORDER — SODIUM CHLORIDE 9 MG/ML
1000 INJECTION, SOLUTION INTRAVENOUS ONCE
Refills: 0 | Status: COMPLETED | OUTPATIENT
Start: 2019-12-07 | End: 2019-12-07

## 2019-12-07 RX ORDER — HYDROCORTISONE 20 MG
100 TABLET ORAL ONCE
Refills: 0 | Status: COMPLETED | OUTPATIENT
Start: 2019-12-07 | End: 2019-12-07

## 2019-12-07 RX ORDER — PANTOPRAZOLE SODIUM 20 MG/1
40 TABLET, DELAYED RELEASE ORAL
Refills: 0 | Status: DISCONTINUED | OUTPATIENT
Start: 2019-12-07 | End: 2019-12-12

## 2019-12-07 RX ORDER — DEXTROSE 50 % IN WATER 50 %
25 SYRINGE (ML) INTRAVENOUS ONCE
Refills: 0 | Status: DISCONTINUED | OUTPATIENT
Start: 2019-12-07 | End: 2019-12-12

## 2019-12-07 RX ORDER — SODIUM CHLORIDE 9 MG/ML
1000 INJECTION, SOLUTION INTRAVENOUS
Refills: 0 | Status: DISCONTINUED | OUTPATIENT
Start: 2019-12-07 | End: 2019-12-12

## 2019-12-07 RX ORDER — SODIUM CHLORIDE 9 MG/ML
3 INJECTION INTRAMUSCULAR; INTRAVENOUS; SUBCUTANEOUS
Refills: 0 | Status: DISCONTINUED | OUTPATIENT
Start: 2019-12-07 | End: 2019-12-12

## 2019-12-07 RX ORDER — DEXTROSE 50 % IN WATER 50 %
12.5 SYRINGE (ML) INTRAVENOUS ONCE
Refills: 0 | Status: DISCONTINUED | OUTPATIENT
Start: 2019-12-07 | End: 2019-12-12

## 2019-12-07 RX ORDER — ONDANSETRON 8 MG/1
4 TABLET, FILM COATED ORAL ONCE
Refills: 0 | Status: COMPLETED | OUTPATIENT
Start: 2019-12-07 | End: 2019-12-07

## 2019-12-07 RX ORDER — CEFUROXIME AXETIL 250 MG
500 TABLET ORAL EVERY 12 HOURS
Refills: 0 | Status: DISCONTINUED | OUTPATIENT
Start: 2019-12-07 | End: 2019-12-07

## 2019-12-07 RX ORDER — INSULIN LISPRO 100/ML
VIAL (ML) SUBCUTANEOUS AT BEDTIME
Refills: 0 | Status: DISCONTINUED | OUTPATIENT
Start: 2019-12-07 | End: 2019-12-12

## 2019-12-07 RX ORDER — APIXABAN 2.5 MG/1
5 TABLET, FILM COATED ORAL EVERY 12 HOURS
Refills: 0 | Status: DISCONTINUED | OUTPATIENT
Start: 2019-12-07 | End: 2019-12-12

## 2019-12-07 RX ORDER — DEXTROSE 50 % IN WATER 50 %
15 SYRINGE (ML) INTRAVENOUS ONCE
Refills: 0 | Status: DISCONTINUED | OUTPATIENT
Start: 2019-12-07 | End: 2019-12-12

## 2019-12-07 RX ORDER — MEROPENEM 1 G/30ML
1000 INJECTION INTRAVENOUS ONCE
Refills: 0 | Status: COMPLETED | OUTPATIENT
Start: 2019-12-07 | End: 2019-12-07

## 2019-12-07 RX ORDER — BUDESONIDE AND FORMOTEROL FUMARATE DIHYDRATE 160; 4.5 UG/1; UG/1
2 AEROSOL RESPIRATORY (INHALATION)
Refills: 0 | Status: DISCONTINUED | OUTPATIENT
Start: 2019-12-07 | End: 2019-12-12

## 2019-12-07 RX ORDER — IPRATROPIUM/ALBUTEROL SULFATE 18-103MCG
3 AEROSOL WITH ADAPTER (GRAM) INHALATION ONCE
Refills: 0 | Status: COMPLETED | OUTPATIENT
Start: 2019-12-07 | End: 2019-12-07

## 2019-12-07 RX ORDER — ACETAMINOPHEN 500 MG
650 TABLET ORAL EVERY 6 HOURS
Refills: 0 | Status: DISCONTINUED | OUTPATIENT
Start: 2019-12-07 | End: 2019-12-12

## 2019-12-07 RX ORDER — IPRATROPIUM/ALBUTEROL SULFATE 18-103MCG
3 AEROSOL WITH ADAPTER (GRAM) INHALATION ONCE
Refills: 0 | Status: DISCONTINUED | OUTPATIENT
Start: 2019-12-07 | End: 2019-12-07

## 2019-12-07 RX ORDER — GLUCAGON INJECTION, SOLUTION 0.5 MG/.1ML
1 INJECTION, SOLUTION SUBCUTANEOUS ONCE
Refills: 0 | Status: DISCONTINUED | OUTPATIENT
Start: 2019-12-07 | End: 2019-12-12

## 2019-12-07 RX ORDER — MONTELUKAST 4 MG/1
10 TABLET, CHEWABLE ORAL AT BEDTIME
Refills: 0 | Status: DISCONTINUED | OUTPATIENT
Start: 2019-12-07 | End: 2019-12-12

## 2019-12-07 RX ORDER — TOBRAMYCIN SULFATE 40 MG/ML
300 VIAL (ML) INJECTION EVERY 12 HOURS
Refills: 0 | Status: DISCONTINUED | OUTPATIENT
Start: 2019-12-07 | End: 2019-12-12

## 2019-12-07 RX ORDER — ACETAMINOPHEN 500 MG
1000 TABLET ORAL ONCE
Refills: 0 | Status: COMPLETED | OUTPATIENT
Start: 2019-12-07 | End: 2019-12-07

## 2019-12-07 RX ORDER — INSULIN LISPRO 100/ML
VIAL (ML) SUBCUTANEOUS
Refills: 0 | Status: DISCONTINUED | OUTPATIENT
Start: 2019-12-07 | End: 2019-12-12

## 2019-12-07 RX ORDER — IPRATROPIUM/ALBUTEROL SULFATE 18-103MCG
3 AEROSOL WITH ADAPTER (GRAM) INHALATION EVERY 6 HOURS
Refills: 0 | Status: DISCONTINUED | OUTPATIENT
Start: 2019-12-07 | End: 2019-12-12

## 2019-12-07 RX ADMIN — SODIUM CHLORIDE 25 MILLILITER(S): 9 INJECTION, SOLUTION INTRAVENOUS at 06:43

## 2019-12-07 RX ADMIN — SODIUM CHLORIDE 25 MILLILITER(S): 9 INJECTION, SOLUTION INTRAVENOUS at 06:37

## 2019-12-07 RX ADMIN — Medication 400 MILLIGRAM(S): at 22:16

## 2019-12-07 RX ADMIN — Medication 3 MILLILITER(S): at 07:38

## 2019-12-07 RX ADMIN — MEROPENEM 100 MILLIGRAM(S): 1 INJECTION INTRAVENOUS at 05:33

## 2019-12-07 RX ADMIN — ONDANSETRON 4 MILLIGRAM(S): 8 TABLET, FILM COATED ORAL at 20:04

## 2019-12-07 RX ADMIN — Medication 75 MILLIGRAM(S): at 22:01

## 2019-12-07 RX ADMIN — Medication 650 MILLIGRAM(S): at 12:31

## 2019-12-07 RX ADMIN — Medication 3 MILLILITER(S): at 23:35

## 2019-12-07 RX ADMIN — ONDANSETRON 4 MILLIGRAM(S): 8 TABLET, FILM COATED ORAL at 04:43

## 2019-12-07 RX ADMIN — SODIUM CHLORIDE 25 MILLILITER(S): 9 INJECTION, SOLUTION INTRAVENOUS at 22:16

## 2019-12-07 RX ADMIN — SODIUM CHLORIDE 1000 MILLILITER(S): 9 INJECTION, SOLUTION INTRAVENOUS at 04:38

## 2019-12-07 RX ADMIN — Medication 650 MILLIGRAM(S): at 19:11

## 2019-12-07 RX ADMIN — ONDANSETRON 4 MILLIGRAM(S): 8 TABLET, FILM COATED ORAL at 10:20

## 2019-12-07 RX ADMIN — ATORVASTATIN CALCIUM 20 MILLIGRAM(S): 80 TABLET, FILM COATED ORAL at 22:01

## 2019-12-07 RX ADMIN — APIXABAN 5 MILLIGRAM(S): 2.5 TABLET, FILM COATED ORAL at 22:01

## 2019-12-07 RX ADMIN — MONTELUKAST 10 MILLIGRAM(S): 4 TABLET, CHEWABLE ORAL at 23:26

## 2019-12-07 RX ADMIN — Medication 3 MILLILITER(S): at 12:31

## 2019-12-07 RX ADMIN — Medication 20 MILLIGRAM(S): at 22:09

## 2019-12-07 RX ADMIN — Medication 100 MILLIGRAM(S): at 04:38

## 2019-12-07 NOTE — H&P ADULT - ASSESSMENT
69 year old female with hx of tracheobronchomalacia s/p tracheobronchoplasty in October, on chronic low dose prednisone with subsequent adrenal insufficiency, severe allergic asthma on dupilumuab (IL4 antagonist, MAB), bronchiectasis on inhaled tobramycin (started two weeks ago), lung nodules, A-fib on elliquis, T2DM, HTN, squamous cell CA of anus s/p chemo/XRT in 2017, and a procectomy with a diverting ostomy in 7/2018 who presents today with shortness of breath and vomiting found to have influenza

## 2019-12-07 NOTE — H&P ADULT - NSHPSOCIALHISTORY_GEN_ALL_CORE
Lives at home with her sister and brother in law for the past two years. Prior to visit here, from [place]. Patient denies drinking, smoking or drug use.

## 2019-12-07 NOTE — H&P ADULT - PROBLEM SELECTOR PLAN 6
- on lantus 10 QAM and novolog SSI, stat dose of 7 units given at 4 pm and LSSI   - monitor for steroid induced hyperglycemia

## 2019-12-07 NOTE — H&P ADULT - NSHPPHYSICALEXAM_GEN_ALL_CORE
General: elderly female, on NC NAD  HEENT: PERRL, EOMI, Dry MM  Neck: Supple, no JVD  Cardiac: right chest wall port, site dry and intact. RRR, normal s1 and s2, faint systolic murmur   Lungs: poor inspiratory effort, CTAB up until the mid lung with decreased breath sounds on the right middle and right lower lung   Abdomen: soft, nondistended abdomen. Bowel sounds present. non TTP. Ostomy site intact with minimal output from ostomy  Extremities: no cyanosis, pitting or edema. Palpable pulses   Neuro: AAO x 3, Cr II - XII intact, 5/5 strength in UE and LE

## 2019-12-07 NOTE — ED PROVIDER NOTE - PSH
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  H/O pelvic surgery  5 years ago - s/p fracture  H/O total knee replacement, bilateral  5 years ago  History of partial hysterectomy  30 years ago - fibroids  History of sinus surgery  multiple sinus surgeries  History of tracheomalacia  2015 - attempted tracheal stenting ([facility])- course complicated by obstruction, respiratory failure, multiple CPR attempts -  stent discontinued; 10/20/2016 Tracheobronchoplasty (Prolene Mesh) performed at [facility] by Dr Zapien  Rectal bleeding  exam under anesthesia ([facility]) 2/2018  S/P bronchoscopy  6/5/2018 - [facility] (Dr Zapien) no evidence of tracheobronchomalacia in trachea or bronchial tubes

## 2019-12-07 NOTE — ED ADULT NURSE NOTE - NSIMPLEMENTINTERV_GEN_ALL_ED
Implemented All Universal Safety Interventions:  [place] to call system. Call bell, personal items and telephone within reach. Instruct patient to call for assistance. Room bathroom lighting operational. Non-slip footwear when patient is off stretcher. Physically safe environment: no spills, clutter or unnecessary equipment. Stretcher in lowest position, wheels locked, appropriate side rails in place.

## 2019-12-07 NOTE — H&P ADULT - NSHPREVIEWOFSYSTEMS_GEN_ALL_CORE
General: denies fevers, chills, recent changes in weight   HEENT: denies vision, trouble or pain with swallowing  Cardiac: denies any chest pain, palpitations   Lungs: + chronic cough with green productive sputum, denies pleuritic chest pain   Abdomen: denies any abdominal pain, constipation or diarrhea. + decreased output from ostomy site   Extremities: denies any focal weakness, or numbness, denies any leg swelling   Neuro: denies any dizziness, lightheadedness, focal weakness or numbness  Heme: denies any bleeding or bruising   Skin: denies any open lesions or sores   Psych: denies any SI/HI

## 2019-12-07 NOTE — H&P ADULT - PROBLEM SELECTOR PLAN 5
- currently rate controlled, on elliquis BID - currently rate controlled on digoxin, on elliquis BID

## 2019-12-07 NOTE — ED PROVIDER NOTE - CLINICAL SUMMARY MEDICAL DECISION MAKING FREE TEXT BOX
69F with PMH as above, here with worsening SOB/cough likely PNA. Pt has h/o pseudomonal coverage so will cover with meropenem. Stress dose steroids. CT chest and AP (pt has iodine allergy). Pt also vomiting and has h/o of abdominal surgery so will obtain scan with oral contrast. 69F with PMH as above, here with worsening SOB/cough likely PNA. Pt has h/o pseudomonal coverage so will cover with meropenem. Stress dose steroids. CT chest and AP (pt has iodine allergy). Pt also vomiting and has h/o of abdominal surgery so will obtain scan with oral contrast.  Attending Isabel Waller: 68 y/o female with multiple medical issues including lung disease, adrenal insufficiency on chronic steroids, presenting with cough and sob. upon arrival pt with low grade temp. immunosuppressed secondary to chronic steroids and given stress dose steroids. concern for possible PNA vs viral illness. pt pan cultured and given broad spectrum abx. a line predominant on pocus and given fluids slowly. BP stable. will need admission

## 2019-12-07 NOTE — ED ADULT NURSE REASSESSMENT NOTE - NS ED NURSE REASSESS COMMENT FT1
Pt resting comfortably, albuterol treatment given. On auscultation there are no wheezes bilaterally.

## 2019-12-07 NOTE — CONSULT NOTE ADULT - ASSESSMENT
Please send RVP  CT chest pending 68 yo F with a h/o TBM s/p tracheo-bronchoplasty in 10/16 on chronic low dose Prednisone, severe allergic asthma recently started on Dupixent, bronchiectasis on inhaled Tobramycin, lung nodules, Afib on Eliquis, DM2, HTN, Squamous cell CA of the anus s/p chemo/XRT in 2017, s/p abdominoperineal proctectomy for recurrent exophytic anal cancer in 7/18 who presents with acute onset vomitting x1 day, productive cough and worsening SOB. She reports daily cough, productive of yellow to green sputum, no hemoptysis, associated with mild chest tightness. Recently had a bronchoscopy with Dr. Mcclellan at [facility] - sputum cultures grew pseudomonas, proteus and acinetobacter     Last admitted 8/2019 for asthma exacerbation 2/2 entero/rhinovirus respiratory infection and prior to that 2/2019 for coronavirus resp infection.   CXR: no acute patholgy  CT chest: new multiple small nodules, RUL and RML   VS- T 100, O2 Sats % on RA  Received Meropenem, 1L LR, Duonebs, Hydrocortisone   RVP - not sent      A/P:   Severe allergic asthma, recently started on Dupixent, Chronic steroid use  Chronic productive cough, changing color yellow to green. CT chest with new nodules RUL/RML - possible pneumonia/bronchiolitis. Also with acute onset GI symptoms - nausea/vomitting x7 times, nonbloody/nonbilious, osteomy output unchanged. CT abdomen unremarkable for acute process. Feeling better right now and resting comfortably    Rec-  Please send RVP and Sputum cultures  c/w antibiotics - has multiple allergies, can c/w Meropenem and Vanco for now  f/u blood cultures  c/w Duonebs, Budesonide nebs, Kaz inhaled, hypersal inhaled  Acapella device  Supplemental O2, goal sats 92-96%  Monitor ostomy output and GI symptoms - likely viral   DVT ppx    Thank you for the consult.  Please call the answering service with questions over the weekend. 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 She reports daily cough, productive of yellow to green sputum, no hemoptysis, associated with mild chest tightness. Recently had a bronchoscopy with Dr. Mcclellan at [facility] - sputum cultures grew pseudomonas, proteus and acinetobacter     Last admitted 8/2019 for asthma exacerbation 2/2 entero/rhinovirus respiratory infection and prior to that 2/2019 for coronavirus resp infection.   CXR: no acute patholgy  CT chest: new multiple small nodules, RUL and RML   VS- T 100, O2 Sats % on RA  Received Meropenem, 1L LR, Duonebs, Hydrocortisone   RVP - not sent      A/P:   Severe allergic asthma, recently started on Dupixent, Chronic steroid use  Chronic productive cough, changing color yellow to green. CT chest with new nodules RUL/RML - possible pneumonia/bronchiolitis. Also with acute onset GI symptoms - nausea/vomitting x7 times, nonbloody/nonbilious, osteomy output unchanged. CT abdomen unremarkable for acute process. Feeling better right now and resting comfortably    Rec-  Please send RVP and Sputum cultures  c/w antibiotics - has multiple allergies, can c/w Meropenem and Vanco for now  f/u blood cultures  c/w Duonebs, Budesonide nebs, Kaz inhaled, hypersal inhaled  Acapella device  Supplemental O2, goal sats 92-96%  Monitor ostomy output and GI symptoms - likely viral   DVT ppx  Admit to medicine    Thank you for the consult.  Please call the answering service with questions over the weekend.

## 2019-12-07 NOTE — ED ADULT NURSE NOTE - PSH
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  H/O pelvic surgery  5 years ago - s/p fracture  H/O total knee replacement, bilateral  5 years ago  History of partial hysterectomy  30 years ago - fibroids  History of sinus surgery  multiple sinus surgeries  History of tracheomalacia  2015 - attempted tracheal stenting ([facility])- course complicated by obstruction, respiratory failure, multiple CPR attempts -  stent discontinued; 10/20/2016 Tracheobronchoplasty (Prolene Mesh) performed at [facility] by Dr Zapien  Rectal bleeding  exam under anesthesia ([facility]) 2/2018  S/P bronchoscopy  6/5/2018 - [facility] (Dr Zapien) no evidence of tracheobronchomalacia in trachea or bronchial tubes

## 2019-12-07 NOTE — H&P ADULT - NSHPLABSRESULTS_GEN_ALL_CORE
EKG, imaging and results personally reviewed by me     EKG: NSR, ST 1-1.5 mm in V1- V3 Q Waves in anterior leads, with repeat EKG pending                           12.4   9.95  )-----------( 238      ( 07 Dec 2019 04:09 )             39.5         140  |  100  |  6<L>  ----------------------------<  154<H>  3.8   |  26  |  0.71    Ca    8.9      07 Dec 2019 04:09    TPro  6.7  /  Alb  4.0  /  TBili  0.2  /  DBili  x   /  AST  17  /  ALT  18  /  AlkPhos  75      PT/INR - ( 07 Dec 2019 04:09 )   PT: 12.6 sec;   INR: 1.09 ratio      PTT - ( 07 Dec 2019 04:09 )  PTT:28.5 sec    Trop 15 -->16-->17   Lactate 1.6 on VBG 7.38/51/44/30    Urinalysis Basic - ( 07 Dec 2019 05:38 )    Color: Light Yellow / Appearance: Clear / S.017 / pH: x  Gluc: x / Ketone: Negative  / Bili: Negative / Urobili: Negative   Blood: x / Protein: Trace / Nitrite: Negative   Leuk Esterase: Negative / RBC: 6 /hpf / WBC 4 /HPF   Sq Epi: x / Non Sq Epi: 1 /hpf / Bacteria: Negative    Flu A +     < from: CT Abdomen and Pelvis w/ Oral Cont (19 @ 09:11) >    FINDINGS:    CHEST:     LUNGS AND LARGE AIRWAYS: Mild dependent secretions in the right mainstem   bronchus and bronchus intermedius. Mild paraseptal emphysema. Unchanged 4   mm nodules in the right lower and middle lobes (2:57 and 2:56), stable   from 10/22/2019. New 4 mm right upper lobe nodule (2:22). New 2 mm right   middle lobe opacity (2:39).  PLEURA: No pleural effusion.  VESSELS: Atherosclerotic calcifications of the aorta and coronary   arteries. Mildly dilated main pulmonary artery which can be seen in   setting of pulmonary arterial hypertension.  HEART: Heart size is normal. Trace pericardial effusion.  MEDIASTINUM AND MARANDA: No lymphadenopathy.  CHEST WALL AND LOWER NECK: Right Mediport catheter with tip in the SVC.   Small droplets of gas are noted in the subcutaneous tissues in the right   anterior chest adjacent to the Mediport. Correlation with  any history of   recent instrumentation is suggested.    ABDOMEN AND PELVIS:    LIVER: Within normal limits.  BILE DUCTS: Normal caliber.  GALLBLADDER: Within normal limits.  SPLEEN: Within normal limits.  PANCREAS: Cystic lesions in the pancreatic tail, the larger measuring 2.2   cm (2:76) are unchanged since 10/22/2019.  ADRENALS: Adrenal glands are diminutive bilaterally.  KIDNEYS/URETERS: No hydronephrosis. Hypodense foci in the left kidney not   well evaluated without intravenous contrast.    BLADDER: Within normal limits.  REPRODUCTIVE ORGANS: Status post hysterectomy.    BOWEL: No bowel obstruction. Status post abdominal perineal resection   with left lower quadrant colostomy. Moderate fecal material in the colon.   Small hiatal hernia.  PERITONEUM: No ascites.  VESSELS: Atherosclerotic calcification.  RETROPERITONEUM/LYMPH NODES: No lymphadenopathy.    ABDOMINAL WALL: Within normal limits.  BONES: Status post plate and screw fixation of the symphysis pubis and   sacrum. Degenerative changes in the spine. Heterogeneous appearance of   the vertebral bodies, unchanged. Sclerotic foci in the bones of the   pelvis, unchanged.    IMPRESSION:     No bowel obstruction. Moderate fecal material in the colon.    Subcentimeter nodular opacities in the right middle and lower lobes again   noted. Several new subcentimeter nodules in the right upper and middle   lobes, of uncertain etiology.    Heterogeneous appearance of the vertebral bodies, unchanged.    Cystic lesions in the pancreatic tail, unchanged.    < end of copied text >

## 2019-12-07 NOTE — CONSULT NOTE ADULT - CONSULT REASON
Shortness of breath, productive cough - asthma exacerbation    Pulmonary consultation on behalf of Dr. Eulalio Allison

## 2019-12-07 NOTE — H&P ADULT - PROBLEM SELECTOR PLAN 7
- patient with hx colorectal cancer s/p chemoXRT and resection now with diverting ileostomy, follows at the [facility]   - as per patient GEETHA

## 2019-12-07 NOTE — H&P ADULT - ATTENDING COMMENTS
Pt seen and examined and agree with above.   69F with tracheobronchomalacia s/p tracheobronchoplasty in October, on chronic low dose prednisone with subsequent adrenal insufficiency, severe allergic asthma on dupilumuab (IL4 antagonist, MAB), on inhaled tobramycin s/p recent bronchoscopy with pseudomonas, proteus and acinetobacter, presents with intractable vomiting. Low grade fever in ED but satting well on RA. Lung exam without clear wheezing, moving air well. Labs significant for (+) influenza A. CT chest with stable lung nodules with new 2mm nodule but clear consolidation/pleural effusion.    A/P: influenza A infection    -start tamiflu 75mg BID  -continue solumedrol 20mg q8hr and tobramycin inhaler  -no s/s superimposed bacterial infection. Monitor off of abx. If decompensates, then start vanco/meropenem  -monitor ostomy output in setting of flu infection  -appreciate pulm recs  -supportive care with abhi hypertonic salinekalli MD  Division of Hospital Medicine  Cell: [phone]  Pager: [phone]  Office: [phone]

## 2019-12-07 NOTE — ED PROVIDER NOTE - PROGRESS NOTE DETAILS
Attending Isabel Waller: pt received IVF and broad spectrum abx. cxr without clear source of infection. CT scans ordered to further evaluate Attending Isabel Waller: on repeat evaluation ext wwp. SBP in 120's. no evidence of sig lactic acidosis. awiating pulmonary call back and ct scans for admission Dr. Valente Ratliff, PGY-2: seen by pulm attending. agree w/ admission for pna. Accepted under hospitalist service

## 2019-12-07 NOTE — ED PROVIDER NOTE - OBJECTIVE STATEMENT
Attending Isabel Waller: 70 y/o female h/o multiple medical issues including adrenal insufficiency, lung disease on chronic steroids, recently started cefdinir and tobracmycin presenrting with vomiting and sob. pt states has not been feeling well for last few days. saw her pulmonolgist dr frazier who prescribed abx. today pt complains of worsening sob and difficulty breahting. cough productive of yellowish sputum. no fevers at home. pt is on chronic steroids.

## 2019-12-07 NOTE — ED PROVIDER NOTE - ATTENDING CONTRIBUTION TO CARE
Attending MD Isabel Waller:  I personally have seen and examined this patient.  Resident note reviewed and agree on plan of care and except where noted.  See HPI, PE, and MDM for details.

## 2019-12-07 NOTE — H&P ADULT - HISTORY OF PRESENT ILLNESS
This is a 69 year old female with hx of tracheobronchomalacia s/p tracheobronchoplasty in October, on chronic low dose prednisone with subsequent adrenal insufficiency, severe allergic asthma on dupilumuab (IL4 antagonist, MAB), bronchiectasis on inhaled tobramycin (started two weeks ago), lung nodules, A-fib on elliquis, T2DM, HTN, squamous cell CA of anus s/p chemo/XRT in 2017, and a procectomy with a diverting ostomy in 7/2018 who presents today with shortness of breath and vomiting. Patient states that she was seen two weeks ago and was started on inhaled tobramycin, and methylprednisolone 8mg BID in Dr. Allison's office. Patient was then seen by her cardiologist who started her on ceftin for concern for PNA.     Last night she had started to have nausea and vomiting with 7 to 8 episodes (with food and some dry heaves). States that this was not directly related to food but noted that she had a decrease in appetite and has decreased output from the ostomy, without change in color or consistency. Denies any fevers but also reports that she usually never runs a fever of higher than . Denies any sick contacts at home, lives with sister and her . Was told not to get the flu shot this year. Reports cough with productive green phlegm, which has been reportedly the same over the past several months. Denies increase in sputum production. Denies any urinary symptoms.     In the ED:  Tmax of 100 HR 60s-90s -152/59-94 RR 18-22 on 4L    Given tylenol 650mg x 1, hydrcortisone 100mg x 1, meropenem 1g x 1, zofran 4mg x 2, LR 1 L bolus and duoneb x 3   Seen by pulmonology in the ED, recommend continuing antibiotics and obtaining cultures

## 2019-12-07 NOTE — H&P ADULT - NSICDXPASTSURGICALHX_GEN_ALL_CORE_FT
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    H/O pelvic surgery 5 years ago - s/p fracture    H/O total knee replacement, bilateral 5 years ago    History of partial hysterectomy 30 years ago - fibroids    History of sinus surgery multiple sinus surgeries    History of tracheomalacia 2015 - attempted tracheal stenting ([facility])- course complicated by obstruction, respiratory failure, multiple CPR attempts -  stent discontinued; 10/20/2016 Tracheobronchoplasty (Prolene Mesh) performed at [facility] by Dr Zapien    Rectal bleeding exam under anesthesia ([facility]) 2/2018    S/P bronchoscopy 6/5/2018 - [facility] (Dr Zapien) no evidence of tracheobronchomalacia in trachea or bronchial tubes

## 2019-12-07 NOTE — H&P ADULT - PROBLEM SELECTOR PLAN 2
- patient with persistent severe asthma  - as above, nebulizers and will increase to solumedrol 20 q 8 hrs  - patient on IL4 MAB q 2 weeks (Dupixent) --> due again in roughly 2 weeks

## 2019-12-07 NOTE — H&P ADULT - PROBLEM SELECTOR PLAN 1
- patient presented with shortness of breath and borderline fever on steroids with flu A positivity   - duoneb PRN, budesonide nebs, spiriva and acapella. Will start solumedrol 20 q 8hrs - patient presented with shortness of breath and borderline fever on steroids with flu A positivity   - duoneb PRN, budesonide nebs, spiriva and acapella, hypersal BID. Will continue with ceftin and inhaled tobramycin given recent bronchoscopy in Novemeber and proteus cultures. Will start solumedrol 20 q 8hrs, was on methylpred 8 mg BID at home

## 2019-12-07 NOTE — CHART NOTE - NSCHARTNOTEFT_GEN_A_CORE
Patient signed out to ADS at 5:34 pm  [ ] Follow up serial ekg, suspicion for acute ischemic event low - - given 1 unit increase in troponemia low suspicion.

## 2019-12-07 NOTE — ED PROVIDER NOTE - PHYSICAL EXAMINATION
General: Well developed, well nourished  HEENT: Normocephalic and atraumatic, Trachea midline.   Cardiac: Normal S1 and S2 w/ RRR. No MRG.  Pulmonary: Diffuse rhonchi. unable to take deep inspirations as it provokes wet cough.  Breathing comfortably on 2L NC  Abdominal: ostomy. Soft, NTND  Neurologic: AAOx3, moving all extremities   Musculoskeletal: No limited ROM.  Vascular: Warm and well perfused  Skin: Color appropriate for race.   Psychiatric: Appropriate mood and affect. No apparent risk to self or others.  Scott Chen M.D. PGY-2 General: Well developed, well nourished  HEENT: Normocephalic and atraumatic, Trachea midline.   Cardiac: Normal S1 and S2 w/ RRR. No MRG.  Pulmonary: Diffuse rhonchi. unable to take deep inspirations as it provokes wet cough.  Breathing comfortably on 2L NC  Abdominal: ostomy. Soft, NTND  Neurologic: AAOx3, moving all extremities   Musculoskeletal: No limited ROM.  Vascular: Warm and well perfused  Skin: Color appropriate for race.   Psychiatric: Appropriate mood and affect. No apparent risk to self or others.  Scott Chen M.D. PGY-2  Attending Isabel Waller: Gen: NAD, heent: atrauamtic, eomi, perrla, mmm, op pink, uvula midline, neck; nttp, no nuchal rigidity, chest: nttp, no crepitus, cv: rrr,+murmurs, lungs: wheezing and scattered crackles, abd: soft, nontender, nondistended, no peritoneal signs, +BS, no guarding, ext: wwp, neg homans, skin: no rash, neuro: awake and alert, following commands, speech clear, sensation and strength intact, no focal deficits

## 2019-12-07 NOTE — ED ADULT NURSE NOTE - OBJECTIVE STATEMENT
70 y/o female A&Ox3 with hx of COPD, asthma, Afib and DM presents to ED via EMS for SOB and vomiting. As per pt, was recently diagnosed with multiple lung infections and being treated with antibiotics. Pt reports increased SOB, EMS placed pt on 4L NC O2 for comfort, pt denies O2 NC use at home. Non labored respirations, no use of accessory muscles, pt sat-ing at 100% on 4L NC O2. Pt denies any CP, pt placed on cardiac monitor, NSR with HR in the 90s. Today pt experienced multiple episodes of "dark" vomit, +nausea and lack of appetite. Ileostomy in place, abdomen soft, non tender and non distended. Denies dysuria, burning with urination and blood in urine or stool. No fevers, chills, diarrhea at home. Safety measures maintained with bed in low position and side rails up. 70 y/o female A&Ox3 with hx of COPD, asthma, Afib and DM presents to ED via EMS for SOB and vomiting. As per pt, was recently diagnosed with multiple lung infections and being treated with antibiotics. Pt reports increased SOB, EMS placed pt on 4L NC O2 for comfort, pt denies O2 NC use at home. +productive "yellow mucus" cough", non labored respirations, no use of accessory muscles, pt sat-ing at 100% on 4L NC O2. Pt denies any CP, pt placed on cardiac monitor, NSR with HR in the 90s. Today pt experienced multiple episodes of "dark" vomit, +nausea and lack of appetite. Ileostomy in place, abdomen soft, non tender and non distended. Denies dysuria, burning with urination and blood in urine or stool. No fevers, chills, diarrhea at home. Safety measures maintained with bed in low position and side rails up. 70 y/o female A&Ox3 with hx of COPD, asthma, Afib and DM presents to ED via EMS for SOB and vomiting. As per pt, was recently diagnosed with multiple lung infections and being treated with antibiotics. Pt reports increased SOB that worsens with inhalation, EMS placed pt on 4L NC O2 for comfort, pt denies O2 NC use at home. +Productive "yellow mucus" cough", b/l breath sounds clear, non labored respirations, no use of accessory muscles, pt sat-ing at 100% on 4L NC O2. Pt denies any CP, pt placed on cardiac monitor, NSR with HR in the 90s. Today pt experienced multiple episodes of "dark" vomit, +nausea and lack of appetite. Ileostomy in place, abdomen soft, non tender and non distended. Denies dysuria, burning with urination and blood in urine or stool. No fevers, chills, diarrhea at home. Safety measures maintained with bed in low position and side rails up.

## 2019-12-07 NOTE — ED ADULT NURSE REASSESSMENT NOTE - NS ED NURSE REASSESS COMMENT FT1
Pt resting comfortably, as per MD Waller nasal cannula was taken off to observe patients breathing and oxygen saturation, pt sating at 94% room air and receiving a duoneb treatment right now, will continue to monitor.

## 2019-12-07 NOTE — H&P ADULT - PROBLEM SELECTOR PLAN 3
- patient on chronic low dose asthma (was down to 4mg methylpred) before recent exacerbation, now on 8 BID --> incerease to solumedrol 20 q 8 hrs   - need to be mindful, if patient becomes hypotensive --> will need to consider hydrocortisone 50 q8 as she already got stress dose in the ED

## 2019-12-07 NOTE — CONSULT NOTE ADULT - SUBJECTIVE AND OBJECTIVE BOX
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  Has been undergoing extensive work up over the past month for progressive KRAMER including recent TTE, V/Q scan and CT chest. Notable findings for right sided peripheral lung nodules, unchanged from prior CTs. Echo with mod-severe AR and moderately dilated LA, diastolic dysfunction, normal RSVP.   Most recent PFTs with evidence of severe obstructive lung disease.   Last admitted 2019 for asthma exacerbation  entero/rhinovirus respiratory infection and prior to that 2019 for coronavirus resp infection.     CXR: no acute patholgy  CT chest pending  VS- T 100, O2 Sats 99% on RA  Received Meropenem, 1L LR, Duonebs, Hydrocortisone   RVP - not sent    HOME MEDICATIONS: Breo, Albuterol nebs, spiriva, prednisone, Dupixent, Kaz inh    PAST MEDICAL & SURGICAL HISTORY:  TIA (transient ischemic attack): multiple, last 5 years ago - presents as right-sided weakness  DVT (deep venous thrombosis): 15-20 years ago, took coumadin  Seizure: x 1 18  Rectal bleeding  Colorectal cancer: 2018- last treatment , chemo and radiation  Tracheobronchomalacia: diagnosed , s/p bronchial thermoplasty  (Dr Zapien); recent bronchoscopy 2018 revealed no evidence of tracheobronchomalacia in trachea or bronchial tubes  Asthma  Pelvic fracture  Aortic insufficiency: moderate AR on echo 5/3/2018  Adrenal insufficiency: Medrol daily for over 50 years  COPD (chronic obstructive pulmonary disease)  Diabetes: Type 2  Atrial fibrillation: paroxysmal, on eliquis  Rectal bleeding: exam under anesthesia ([facility]) 2018  S/P bronchoscopy: 2018 - [facility] (Dr Zapien) no evidence of tracheobronchomalacia in trachea or bronchial tubes  History of tracheomalacia:  - attempted tracheal stenting ([facility])- course complicated by obstruction, respiratory failure, multiple CPR attempts -  stent discontinued; 10/20/2016 Tracheobronchoplasty (Prolene Mesh) performed at [facility] by Dr Zapien  H/O pelvic surgery: 5 years ago - s/p fracture  Exostosis of orbit, 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  History of sinus surgery: multiple sinus surgeries  H/O total knee replacement, bilateral: 5 years ago  History of partial hysterectomy: 30 years ago - fibroids      FAMILY HISTORY:  Family history of diabetes mellitus type II  Family history of breast cancer (Sibling)  Family history of asthma    SOCIAL HISTORY:  Smoking: [ x] Never Smoked [ ] Former Smoker (__ packs x ___ years) [ ] Current Smoker  (__ packs x ___ years)  Substance Use: [x ] Never Used [ ] Used ____  EtOH Use: denies    Allergies  ampicillin (Short breath)  aspirin (Short breath)  Avelox (Short breath; Pruritus)  codeine (Short breath)  Dilaudid (Short breath)  iodine (Short breath; Swelling)  penicillin (Short breath)  shellfish (Anaphylaxis)  tetanus toxoid (Short breath)  Valium (Short breath)          REVIEW OF SYSTEMS:  Constitutional: [ ] fevers [ ] chills [ ] weight loss [ ] weight gain  HEENT: [ ] dry eyes [ ] eye irritation [ ] postnasal drip [ ] nasal congestion  CV: [ ] chest pain [ ] orthopnea [ ] palpitations [ ] murmur  Resp: [ ] cough [ ] shortness of breath [ ] wheezing [ ] sputum [ ] hemoptysis  GI: [ ] nausea [ ] vomiting [ ] diarrhea [ ] constipation [ ] abd pain [ ] dysphagia   : [ ] dysuria [ ] nocturia [ ] hematuria [ ] increased urinary frequency  Musculoskeletal: [ ] myalgias [ ] arthralgias   Skin: [ ] rash [ ] itch  Neurological: [ ] headache [ ] dizziness [ ] syncope [ ] weakness [ ] numbness  Psychiatric: [ ] anxiety [ ] depression  Endocrine: [ ] diabetes [ ] thyroid problem  Hematologic/Lymphatic: [ ] anemia [ ] bleeding problem  Allergic/Immunologic: [ ] itchy eyes [ ] nasal discharge [ ] hives [ ] angioedema  [ ] All other systems negative  [ ] Unable to assess ROS because ________    OBJECTIVE:  ICU Vital Signs Last 24 Hrs  T(C): 37.7 (07 Dec 2019 07:35), Max: 37.8 (07 Dec 2019 03:18)  T(F): 99.8 (07 Dec 2019 07:35), Max: 100 (07 Dec 2019 03:18)  HR: 92 (07 Dec 2019 07:35) (64 - 99)  BP: 128/60 (07 Dec 2019 07:35) (128/60 - 152/75)  BP(mean): --  ABP: --  ABP(mean): --  RR: 19 (07 Dec 2019 07:35) (18 - 20)  SpO2: 94% (07 Dec 2019 07:35) (94% - 100%)        CAPILLARY BLOOD GLUCOSE          PHYSICAL EXAM:  General:  NAD  HEENT:  NC/AT  Lymph Nodes: No cervical or supraclavicular lymphadenopathy   Neck: Supple  Respiratory:  CTA B/L, no wheezes, crackles or rhonchi  Cardiovascular:  RRR, no m/r/g  Abdomen: soft, NT/ND, +BS  Extremities: no clubbing, cyanosis or edema, warm  Skin: no rash  Neurological: AAOx3, no focal deficits  Psychiatry: not anxious, normal affect    HOSPITAL MEDICATIONS:  MEDICATIONS  (STANDING):  lactated ringers. 1000 milliLiter(s) (25 mL/Hr) IV Continuous <Continuous>    MEDICATIONS  (PRN):      LABS:                        12.4   9.95  )-----------( 238      ( 07 Dec 2019 04:09 )             39.5     Hgb Trend: 12.4<--  12    140  |  100  |  6<L>  ----------------------------<  154<H>  3.8   |  26  |  0.71    Ca    8.9      07 Dec 2019 04:09    TPro  6.7  /  Alb  4.0  /  TBili  0.2  /  DBili  x   /  AST  17  /  ALT  18  /  AlkPhos  75  12    Creatinine Trend: 0.71<--  PT/INR - ( 07 Dec 2019 04:09 )   PT: 12.6 sec;   INR: 1.09 ratio         PTT - ( 07 Dec 2019 04:09 )  PTT:28.5 sec  Urinalysis Basic - ( 07 Dec 2019 05:38 )    Color: Light Yellow / Appearance: Clear / S.017 / pH: x  Gluc: x / Ketone: Negative  / Bili: Negative / Urobili: Negative   Blood: x / Protein: Trace / Nitrite: Negative   Leuk Esterase: Negative / RBC: 6 /hpf / WBC 4 /HPF   Sq Epi: x / Non Sq Epi: 1 /hpf / Bacteria: Negative        Venous Blood Gas:   @ 04:07  7.38/51/44/30/75  VBG Lactate: 1.6      MICROBIOLOGY:   Sputum culture 19- rare acinetobacter    RADIOLOGY:  [x ] Reviewed and interpreted by me    PULMONARY FUNCTION TESTS:    EKG: [facility] - Division of Pulmonary, Critical Care and Sleep Medicine   Please call [phone] between 8am-pm weekdays, [phone] after hours and weekends      CHIEF COMPLAINT: Productive cough and SOB    HPI: 70 yo F with a h/o TBM s/p tracheo-bronchoplasty in 10/16 on chronic low dose Prednisone, severe allergic asthma recently started on Dupixent, bronchiectasis on inhaled Tobramycin, lung nodules, Afib on Eliquis, DM2, HTN, Squamous cell CA of the anus s/p chemo/XRT in , s/p abdominoperineal proctectomy for recurrent exophytic anal cancer in  who presents with acute onset vomitting x1 day, productive cough and worsening SOB. She reports daily cough, productive of yellow to green sputum, no hemoptysis, associated with mild chest tightness. Recently had a bronchoscopy with Dr. Mcclellan at [facility] - sputum cultures grew pseudomonas, proteus and acinetobacter     Last admitted 2019 for asthma exacerbation  entero/rhinovirus respiratory infection and prior to that 2019 for coronavirus resp infection.   CXR: no acute patholgy  CT chest: new multiple small nodules, RUL and RML   VS- T 100, O2 Sats % on RA  Received Meropenem, 1L LR, Duonebs, Hydrocortisone   RVP - not sent    HOME MEDICATIONS: Breo, Albuterol nebs, spiriva, Medrol 8 mg BID, Dupixent, Kaz inh    PAST MEDICAL & SURGICAL HISTORY:  TIA (transient ischemic attack): multiple, last 5 years ago - presents as right-sided weakness  DVT (deep venous thrombosis): 15-20 years ago, took coumadin  Seizure: x 1 18  Rectal bleeding  Colorectal cancer: 2018- last treatment , chemo and radiation  Tracheobronchomalacia: diagnosed , s/p bronchial thermoplasty  (Dr Zapien); recent bronchoscopy 2018 revealed no evidence of tracheobronchomalacia in trachea or bronchial tubes  Asthma  Pelvic fracture  Aortic insufficiency: moderate AR on echo 5/3/2018  Adrenal insufficiency: Medrol daily for over 50 years  COPD (chronic obstructive pulmonary disease)  Diabetes: Type 2  Atrial fibrillation: paroxysmal, on eliquis  Rectal bleeding: exam under anesthesia ([facility]) 2018  S/P bronchoscopy: 2018 - [facility] (Dr Zapien) no evidence of tracheobronchomalacia in trachea or bronchial tubes  History of tracheomalacia:  - attempted tracheal stenting ([facility])- course complicated by obstruction, respiratory failure, multiple CPR attempts -  stent discontinued; 10/20/2016 Tracheobronchoplasty (Prolene Mesh) performed at [facility] by Dr Zapien  H/O pelvic surgery: 5 years ago - s/p fracture  Exostosis of orbit, 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  History of sinus surgery: multiple sinus surgeries  H/O total knee replacement, bilateral: 5 years ago  History of partial hysterectomy: 30 years ago - fibroids      FAMILY HISTORY:  Family history of diabetes mellitus type II  Family history of breast cancer (Sibling)  Family history of asthma    SOCIAL HISTORY:  Smoking: [ x] Never Smoked [ ] Former Smoker (__ packs x ___ years) [ ] Current Smoker  (__ packs x ___ years)  Substance Use: [x ] Never Used [ ] Used ____  EtOH Use: denies    Allergies  ampicillin (Short breath)  aspirin (Short breath)  Avelox (Short breath; Pruritus)  codeine (Short breath)  Dilaudid (Short breath)  iodine (Short breath; Swelling)  penicillin (Short breath)  shellfish (Anaphylaxis)  tetanus toxoid (Short breath)  Valium (Short breath)          REVIEW OF SYSTEMS:  Constitutional: [ ] fevers [ ] chills [ ] weight loss [ ] weight gain  HEENT: [ ] dry eyes [ ] eye irritation [ ] postnasal drip [ ] nasal congestion  CV: [ ] chest pain [ ] orthopnea [ ] palpitations [ ] murmur  Resp: [x ] cough [x ] shortness of breath [ ] wheezing [ ] sputum [ ] hemoptysis  GI: [ x] nausea [x ] vomiting [ ] diarrhea [ ] constipation [ ] abd pain [ ] dysphagia   : [ ] dysuria [ ] nocturia [ ] hematuria [ ] increased urinary frequency  Musculoskeletal: [ ] myalgias [ ] arthralgias   Skin: [ ] rash [ ] itch  Neurological: [ ] headache [ ] dizziness [ ] syncope [ ] weakness [ ] numbness  Psychiatric: [ ] anxiety [ ] depression  Endocrine: [ ] diabetes [ ] thyroid problem  Hematologic/Lymphatic: [ ] anemia [ ] bleeding problem  Allergic/Immunologic: [ ] itchy eyes [ ] nasal discharge [ ] hives [ ] angioedema  [x ] All other systems negative  [ ] Unable to assess ROS because ________    OBJECTIVE:  ICU Vital Signs Last 24 Hrs  T(C): 37.7 (07 Dec 2019 07:35), Max: 37.8 (07 Dec 2019 03:18)  T(F): 99.8 (07 Dec 2019 07:35), Max: 100 (07 Dec 2019 03:18)  HR: 92 (07 Dec 2019 07:35) (64 - 99)  BP: 128/60 (07 Dec 2019 07:35) (128/60 - 152/75)  BP(mean): --  ABP: --  ABP(mean): --  RR: 19 (07 Dec 2019 07:35) (18 - 20)  SpO2: 94% (07 Dec 2019 07:35) (94% - 100%)        CAPILLARY BLOOD GLUCOSE          PHYSICAL EXAM:  General:  NAD  HEENT:  NC/AT  Lymph Nodes: No cervical or supraclavicular lymphadenopathy   Neck: Supple  Respiratory:  CTA B/L, no wheezes, crackles or rhonchi  Cardiovascular:  RRR, no m/r/g  Abdomen: soft, NT/ND, +BS  Extremities: no clubbing, cyanosis or edema, warm  Skin: no rash  Neurological: AAOx3, no focal deficits  Psychiatry: not anxious, normal affect    HOSPITAL MEDICATIONS:  MEDICATIONS  (STANDING):  lactated ringers. 1000 milliLiter(s) (25 mL/Hr) IV Continuous <Continuous>    MEDICATIONS  (PRN):      LABS:                        12.4   9.95  )-----------( 238      ( 07 Dec 2019 04:09 )             39.5     Hgb Trend: 12.4<--  12    140  |  100  |  6<L>  ----------------------------<  154<H>  3.8   |  26  |  0.71    Ca    8.9      07 Dec 2019 04:09    TPro  6.7  /  Alb  4.0  /  TBili  0.2  /  DBili  x   /  AST  17  /  ALT  18  /  AlkPhos  75  12    Creatinine Trend: 0.71<--  PT/INR - ( 07 Dec 2019 04:09 )   PT: 12.6 sec;   INR: 1.09 ratio         PTT - ( 07 Dec 2019 04:09 )  PTT:28.5 sec  Urinalysis Basic - ( 07 Dec 2019 05:38 )    Color: Light Yellow / Appearance: Clear / S.017 / pH: x  Gluc: x / Ketone: Negative  / Bili: Negative / Urobili: Negative   Blood: x / Protein: Trace / Nitrite: Negative   Leuk Esterase: Negative / RBC: 6 /hpf / WBC 4 /HPF   Sq Epi: x / Non Sq Epi: 1 /hpf / Bacteria: Negative        Venous Blood Gas:   @ 04:07  7.38/51/44/30/75  VBG Lactate: 1.6      MICROBIOLOGY:   Sputum culture 19- rare acinetobacter  10/1/19- moderate pseudomonas, sensitive     RADIOLOGY:  [x ] Reviewed and interpreted by me  < from: CT Chest No Cont (19 @ 09:05) >  EXAM:  CT ABDOMEN AND PELVIS OC                          EXAM:  CT CHEST                            PROCEDURE DATE:  2019            INTERPRETATION:  CLINICAL INFORMATION: Fever, vomiting, shortness of   breath. Evaluate for pneumonia and small bowel obstruction.     COMPARISON: CT chest abdomen pelvis from 10/22/2019.    PROCEDURE:   CT of the Chest, Abdomen and Pelvis was performed without intravenous   contrast.   Intravenous contrast: None.  Oral contrast: Positive contrast was administered.  Sagittal and coronal reformats were performed.    Evaluation of the solid visceral organs and vasculature is limited   without intravenous contrast.    FINDINGS:    CHEST:     LUNGS AND LARGE AIRWAYS: Mild dependent secretions in the right mainstem   bronchus and bronchus intermedius. Mild paraseptal emphysema. Unchanged 4   mm nodules in the right lower and middle lobes (2:57 and 2:56), stable   from 10/22/2019. New 4 mm right upper lobe nodule (2:22). New 2 mm right   middle lobe opacity (2:39).  PLEURA: No pleural effusion.  VESSELS: Atherosclerotic calcifications of the aorta and coronary   arteries. Mildly dilated main pulmonary artery which can be seen in   setting of pulmonary arterial hypertension.  HEART: Heart size is normal. Trace pericardial effusion.  MEDIASTINUM AND MARANDA: No lymphadenopathy.  CHEST WALL AND LOWER NECK: Right Mediport catheter with tip in the SVC.   Small droplets of gas are noted in the subcutaneous tissues in the right   anterior chest adjacent to the Mediport. Correlation with  any history of   recent instrumentation is suggested.    ABDOMEN AND PELVIS:    LIVER: Within normal limits.  BILE DUCTS: Normal caliber.  GALLBLADDER: Within normal limits.  SPLEEN: Within normal limits.  PANCREAS: Cystic lesions in the pancreatic tail, the larger measuring 2.2   cm (2:76) are unchanged since 10/22/2019.  ADRENALS: Adrenal glands are diminutive bilaterally.  KIDNEYS/URETERS: No hydronephrosis. Hypodense foci in the left kidney not   well evaluated without intravenous contrast.    BLADDER: Within normal limits.  REPRODUCTIVE ORGANS: Status post hysterectomy.    BOWEL: No bowel obstruction. Status post abdominal perineal resection   with left lower quadrant colostomy. Moderate fecal material in the colon.   Small hiatal hernia.  PERITONEUM: No ascites.  VESSELS: Atherosclerotic calcification.  RETROPERITONEUM/LYMPH NODES: No lymphadenopathy.    ABDOMINAL WALL: Within normal limits.  BONES: Status post plate and screw fixation of the symphysis pubis and   sacrum. Degenerative changes in the spine. Heterogeneous appearance of   the vertebral bodies, unchanged. Sclerotic foci in the bones of the   pelvis, unchanged.    IMPRESSION:     No bowel obstruction. Moderate fecal material in the colon.    Subcentimeter nodular opacities in the right middle and lower lobes again   noted. Several new subcentimeter nodules in the right upper and middle   lobes, of uncertain etiology.    Heterogeneous appearance of the vertebral bodies, unchanged.    Cystic lesions in the pancreatic tail, unchanged.      GORDO OZUNA M.D., RADIOLOGY RESIDENT  This document has been electronically signed.  LULY NUNES M.D., ATTENDING RADIOLOGIST  This document has been electronically signed.Dec  7 2019 10:22AM        < end of copied text >    PULMONARY FUNCTION TESTS: severe obstructive pattern    EKG: [facility] - Division of Pulmonary, Critical Care and Sleep Medicine   Please call [phone] between 8am-pm weekdays, [phone] after hours and weekends      CHIEF COMPLAINT: Productive cough and SOB    HPI: 68 yo F with a h/o TBM s/p tracheo-bronchoplasty in 10/16 on chronic low dose Prednisone, severe allergic asthma recently started on Dupixent, bronchiectasis on inhaled Tobramycin, lung nodules, Afib on Eliquis, DM2, HTN, Squamous cell CA of the anus s/p chemo/XRT in , s/p abdominoperineal proctectomy for recurrent exophytic anal cancer in  who presents with acute onset vomitting x1 day, productive cough and worsening SOB. She reports daily cough, productive of yellow to green sputum, no hemoptysis, associated with mild chest tightness. Recently had a bronchoscopy with Dr. Mcclellan at [facility] - sputum cultures grew pseudomonas, proteus and acinetobacter     Last admitted 2019 for asthma exacerbation  entero/rhinovirus respiratory infection and prior to that 2019 for coronavirus resp infection.   CXR: no acute patholgy  CT chest: new multiple small nodules, RUL and RML   VS- T 100, O2 Sats % on RA  Received Meropenem, 1L LR, Duonebs, Hydrocortisone   RVP - not sent    HOME MEDICATIONS: Breo, Albuterol nebs, spiriva, Medrol 8 mg BID, Dupixent, Kaz inh    PAST MEDICAL & SURGICAL HISTORY:  TIA (transient ischemic attack): multiple, last 5 years ago - presents as right-sided weakness  DVT (deep venous thrombosis): 15-20 years ago, took coumadin  Seizure: x 1 18  Rectal bleeding  Colorectal cancer: 2018- last treatment , chemo and radiation  Tracheobronchomalacia: diagnosed , s/p bronchial thermoplasty  (Dr Zapien); recent bronchoscopy 2018 revealed no evidence of tracheobronchomalacia in trachea or bronchial tubes  Asthma  Pelvic fracture  Aortic insufficiency: moderate AR on echo 5/3/2018  Adrenal insufficiency: Medrol daily for over 50 years  COPD (chronic obstructive pulmonary disease)  Diabetes: Type 2  Atrial fibrillation: paroxysmal, on eliquis  Rectal bleeding: exam under anesthesia ([facility]) 2018  S/P bronchoscopy: 2018 - [facility] (Dr Zapien) no evidence of tracheobronchomalacia in trachea or bronchial tubes  History of tracheomalacia:  - attempted tracheal stenting ([facility])- course complicated by obstruction, respiratory failure, multiple CPR attempts -  stent discontinued; 10/20/2016 Tracheobronchoplasty (Prolene Mesh) performed at [facility] by Dr Zapien  H/O pelvic surgery: 5 years ago - s/p fracture  Exostosis of orbit, 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  History of sinus surgery: multiple sinus surgeries  H/O total knee replacement, bilateral: 5 years ago  History of partial hysterectomy: 30 years ago - fibroids      FAMILY HISTORY:  Family history of diabetes mellitus type II  Family history of breast cancer (Sibling)  Family history of asthma    SOCIAL HISTORY:  Smoking: [ x] Never Smoked [ ] Former Smoker (__ packs x ___ years) [ ] Current Smoker  (__ packs x ___ years)  Substance Use: [x ] Never Used [ ] Used ____  EtOH Use: denies    Allergies  ampicillin (Short breath)  aspirin (Short breath)  Avelox (Short breath; Pruritus)  codeine (Short breath)  Dilaudid (Short breath)  iodine (Short breath; Swelling)  penicillin (Short breath)  shellfish (Anaphylaxis)  tetanus toxoid (Short breath)  Valium (Short breath)          REVIEW OF SYSTEMS:  Constitutional: [ ] fevers [ ] chills [ ] weight loss [ ] weight gain  HEENT: [ ] dry eyes [ ] eye irritation [ ] postnasal drip [ ] nasal congestion  CV: [ ] chest pain [ ] orthopnea [ ] palpitations [ ] murmur  Resp: [x ] cough [x ] shortness of breath [ ] wheezing [ ] sputum [ ] hemoptysis  GI: [ x] nausea [x ] vomiting [ ] diarrhea [ ] constipation [ ] abd pain [ ] dysphagia   : [ ] dysuria [ ] nocturia [ ] hematuria [ ] increased urinary frequency  Musculoskeletal: [ ] myalgias [ ] arthralgias   Skin: [ ] rash [ ] itch  Neurological: [ ] headache [ ] dizziness [ ] syncope [ ] weakness [ ] numbness  Psychiatric: [ ] anxiety [ ] depression  Endocrine: [ ] diabetes [ ] thyroid problem  Hematologic/Lymphatic: [ ] anemia [ ] bleeding problem  Allergic/Immunologic: [ ] itchy eyes [ ] nasal discharge [ ] hives [ ] angioedema  [x ] All other systems negative  [ ] Unable to assess ROS because ________    OBJECTIVE:  ICU Vital Signs Last 24 Hrs  T(C): 37.7 (07 Dec 2019 07:35), Max: 37.8 (07 Dec 2019 03:18)  T(F): 99.8 (07 Dec 2019 07:35), Max: 100 (07 Dec 2019 03:18)  HR: 92 (07 Dec 2019 07:35) (64 - 99)  BP: 128/60 (07 Dec 2019 07:35) (128/60 - 152/75)  BP(mean): --  ABP: --  ABP(mean): --  RR: 19 (07 Dec 2019 07:35) (18 - 20)  SpO2: 94% (07 Dec 2019 07:35) (94% - 100%)        CAPILLARY BLOOD GLUCOSE          PHYSICAL EXAM:  General:  NAD  HEENT:  NC/AT  Neck: Supple  Respiratory:  CTA B/L, no wheezes, crackles or rhonchi  Cardiovascular:  RRR, no m/r/g  Abdomen: soft, NT/ND, +BS  Extremities: no clubbing, cyanosis or edema, warm  Skin: no rash  Neurological: AAOx3, no focal deficits  Psychiatry: not anxious, normal affect    HOSPITAL MEDICATIONS:  MEDICATIONS  (STANDING):  lactated ringers. 1000 milliLiter(s) (25 mL/Hr) IV Continuous <Continuous>    MEDICATIONS  (PRN):      LABS:                        12.4   9.95  )-----------( 238      ( 07 Dec 2019 04:09 )             39.5     Hgb Trend: 12.4<--  12    140  |  100  |  6<L>  ----------------------------<  154<H>  3.8   |  26  |  0.71    Ca    8.9      07 Dec 2019 04:09    TPro  6.7  /  Alb  4.0  /  TBili  0.2  /  DBili  x   /  AST  17  /  ALT  18  /  AlkPhos  75      Creatinine Trend: 0.71<--  PT/INR - ( 07 Dec 2019 04:09 )   PT: 12.6 sec;   INR: 1.09 ratio         PTT - ( 07 Dec 2019 04:09 )  PTT:28.5 sec  Urinalysis Basic - ( 07 Dec 2019 05:38 )    Color: Light Yellow / Appearance: Clear / S.017 / pH: x  Gluc: x / Ketone: Negative  / Bili: Negative / Urobili: Negative   Blood: x / Protein: Trace / Nitrite: Negative   Leuk Esterase: Negative / RBC: 6 /hpf / WBC 4 /HPF   Sq Epi: x / Non Sq Epi: 1 /hpf / Bacteria: Negative        Venous Blood Gas:   @ 04:07  7.38/51/44/30/75  VBG Lactate: 1.6      MICROBIOLOGY:   Sputum culture 19- rare acinetobacter  10/1/19- moderate pseudomonas, sensitive     RADIOLOGY:  [x ] Reviewed and interpreted by me  < from: CT Chest No Cont (19 @ 09:05) >  EXAM:  CT ABDOMEN AND PELVIS OC                          EXAM:  CT CHEST                            PROCEDURE DATE:  2019            INTERPRETATION:  CLINICAL INFORMATION: Fever, vomiting, shortness of   breath. Evaluate for pneumonia and small bowel obstruction.     COMPARISON: CT chest abdomen pelvis from 10/22/2019.    PROCEDURE:   CT of the Chest, Abdomen and Pelvis was performed without intravenous   contrast.   Intravenous contrast: None.  Oral contrast: Positive contrast was administered.  Sagittal and coronal reformats were performed.    Evaluation of the solid visceral organs and vasculature is limited   without intravenous contrast.    FINDINGS:    CHEST:     LUNGS AND LARGE AIRWAYS: Mild dependent secretions in the right mainstem   bronchus and bronchus intermedius. Mild paraseptal emphysema. Unchanged 4   mm nodules in the right lower and middle lobes (2:57 and 2:56), stable   from 10/22/2019. New 4 mm right upper lobe nodule (2:22). New 2 mm right   middle lobe opacity (2:39).  PLEURA: No pleural effusion.  VESSELS: Atherosclerotic calcifications of the aorta and coronary   arteries. Mildly dilated main pulmonary artery which can be seen in   setting of pulmonary arterial hypertension.  HEART: Heart size is normal. Trace pericardial effusion.  MEDIASTINUM AND MARANDA: No lymphadenopathy.  CHEST WALL AND LOWER NECK: Right Mediport catheter with tip in the SVC.   Small droplets of gas are noted in the subcutaneous tissues in the right   anterior chest adjacent to the Mediport. Correlation with  any history of   recent instrumentation is suggested.    ABDOMEN AND PELVIS:    LIVER: Within normal limits.  BILE DUCTS: Normal caliber.  GALLBLADDER: Within normal limits.  SPLEEN: Within normal limits.  PANCREAS: Cystic lesions in the pancreatic tail, the larger measuring 2.2   cm (2:76) are unchanged since 10/22/2019.  ADRENALS: Adrenal glands are diminutive bilaterally.  KIDNEYS/URETERS: No hydronephrosis. Hypodense foci in the left kidney not   well evaluated without intravenous contrast.    BLADDER: Within normal limits.  REPRODUCTIVE ORGANS: Status post hysterectomy.    BOWEL: No bowel obstruction. Status post abdominal perineal resection   with left lower quadrant colostomy. Moderate fecal material in the colon.   Small hiatal hernia.  PERITONEUM: No ascites.  VESSELS: Atherosclerotic calcification.  RETROPERITONEUM/LYMPH NODES: No lymphadenopathy.    ABDOMINAL WALL: Within normal limits.  BONES: Status post plate and screw fixation of the symphysis pubis and   sacrum. Degenerative changes in the spine. Heterogeneous appearance of   the vertebral bodies, unchanged. Sclerotic foci in the bones of the   pelvis, unchanged.    IMPRESSION:     No bowel obstruction. Moderate fecal material in the colon.    Subcentimeter nodular opacities in the right middle and lower lobes again   noted. Several new subcentimeter nodules in the right upper and middle   lobes, of uncertain etiology.    Heterogeneous appearance of the vertebral bodies, unchanged.    Cystic lesions in the pancreatic tail, unchanged.      GORDO OZUNA M.D., RADIOLOGY RESIDENT  This document has been electronically signed.  LULY NUNES M.D., ATTENDING RADIOLOGIST  This document has been electronically signed.Dec  7 2019 10:22AM        < end of copied text >    PULMONARY FUNCTION TESTS: severe obstructive pattern    EKG:

## 2019-12-07 NOTE — ED PROVIDER NOTE - NS ED ROS FT
REVIEW OF SYSTEMS:  General:  +fever, no chills  HEENT: no headache, no vision changes  Cardiac: no chest pain, no palpitations  Respiratory: +cough, no shortness of breath  Gastrointestinal: no abdominal pain, +nausea, +vomiting, no diarrhea  Genitourinary: no hematuria, no dysuria, no urinary frequency  Extremities: no extremity swelling, no extremity pain  Neuro: no focal weakness, no numbness/tingling of the extremities, no decreased sensation  Heme: no easy bleeding, no easy bruising  Skin: no jaundice,  no rashes, no lesions  All other ROS as documented in HPI  -Scott Chen, PGY-2

## 2019-12-08 LAB
ANION GAP SERPL CALC-SCNC: 14 MMOL/L — SIGNIFICANT CHANGE UP (ref 5–17)
APTT BLD: 30.8 SEC — SIGNIFICANT CHANGE UP (ref 27.5–36.3)
BUN SERPL-MCNC: 10 MG/DL — SIGNIFICANT CHANGE UP (ref 7–23)
CALCIUM SERPL-MCNC: 8.7 MG/DL — SIGNIFICANT CHANGE UP (ref 8.4–10.5)
CHLORIDE SERPL-SCNC: 97 MMOL/L — SIGNIFICANT CHANGE UP (ref 96–108)
CO2 SERPL-SCNC: 26 MMOL/L — SIGNIFICANT CHANGE UP (ref 22–31)
CREAT SERPL-MCNC: 0.71 MG/DL — SIGNIFICANT CHANGE UP (ref 0.5–1.3)
CULTURE RESULTS: SIGNIFICANT CHANGE UP
GLUCOSE BLDC GLUCOMTR-MCNC: 209 MG/DL — HIGH (ref 70–99)
GLUCOSE BLDC GLUCOMTR-MCNC: 265 MG/DL — HIGH (ref 70–99)
GLUCOSE BLDC GLUCOMTR-MCNC: 285 MG/DL — HIGH (ref 70–99)
GLUCOSE BLDC GLUCOMTR-MCNC: 307 MG/DL — HIGH (ref 70–99)
GLUCOSE SERPL-MCNC: 240 MG/DL — HIGH (ref 70–99)
GRAM STN FLD: SIGNIFICANT CHANGE UP
HBA1C BLD-MCNC: 8.2 % — HIGH (ref 4–5.6)
HCT VFR BLD CALC: 40 % — SIGNIFICANT CHANGE UP (ref 34.5–45)
HGB BLD-MCNC: 12 G/DL — SIGNIFICANT CHANGE UP (ref 11.5–15.5)
INR BLD: 1.34 RATIO — HIGH (ref 0.88–1.16)
LEGIONELLA AG UR QL: NEGATIVE — SIGNIFICANT CHANGE UP
MAGNESIUM SERPL-MCNC: 2 MG/DL — SIGNIFICANT CHANGE UP (ref 1.6–2.6)
MCHC RBC-ENTMCNC: 23.2 PG — LOW (ref 27–34)
MCHC RBC-ENTMCNC: 30 GM/DL — LOW (ref 32–36)
MCV RBC AUTO: 77.4 FL — LOW (ref 80–100)
PHOSPHATE SERPL-MCNC: 4.1 MG/DL — SIGNIFICANT CHANGE UP (ref 2.5–4.5)
PLATELET # BLD AUTO: 226 K/UL — SIGNIFICANT CHANGE UP (ref 150–400)
POTASSIUM SERPL-MCNC: 4.3 MMOL/L — SIGNIFICANT CHANGE UP (ref 3.5–5.3)
POTASSIUM SERPL-SCNC: 4.3 MMOL/L — SIGNIFICANT CHANGE UP (ref 3.5–5.3)
PROTHROM AB SERPL-ACNC: 15.5 SEC — HIGH (ref 10–13.1)
RBC # BLD: 5.17 M/UL — SIGNIFICANT CHANGE UP (ref 3.8–5.2)
RBC # FLD: 17.2 % — HIGH (ref 10.3–14.5)
SODIUM SERPL-SCNC: 137 MMOL/L — SIGNIFICANT CHANGE UP (ref 135–145)
SPECIMEN SOURCE: SIGNIFICANT CHANGE UP
SPECIMEN SOURCE: SIGNIFICANT CHANGE UP
WBC # BLD: 8.1 K/UL — SIGNIFICANT CHANGE UP (ref 3.8–10.5)
WBC # FLD AUTO: 8.1 K/UL — SIGNIFICANT CHANGE UP (ref 3.8–10.5)

## 2019-12-08 PROCEDURE — 99233 SBSQ HOSP IP/OBS HIGH 50: CPT

## 2019-12-08 RX ORDER — VANCOMYCIN HCL 1 G
1000 VIAL (EA) INTRAVENOUS EVERY 12 HOURS
Refills: 0 | Status: DISCONTINUED | OUTPATIENT
Start: 2019-12-08 | End: 2019-12-09

## 2019-12-08 RX ORDER — MEROPENEM 1 G/30ML
1000 INJECTION INTRAVENOUS EVERY 8 HOURS
Refills: 0 | Status: DISCONTINUED | OUTPATIENT
Start: 2019-12-08 | End: 2019-12-09

## 2019-12-08 RX ORDER — MEROPENEM 1 G/30ML
INJECTION INTRAVENOUS
Refills: 0 | Status: DISCONTINUED | OUTPATIENT
Start: 2019-12-08 | End: 2019-12-09

## 2019-12-08 RX ORDER — MEROPENEM 1 G/30ML
1000 INJECTION INTRAVENOUS ONCE
Refills: 0 | Status: COMPLETED | OUTPATIENT
Start: 2019-12-08 | End: 2019-12-08

## 2019-12-08 RX ORDER — ONDANSETRON 8 MG/1
4 TABLET, FILM COATED ORAL EVERY 8 HOURS
Refills: 0 | Status: DISCONTINUED | OUTPATIENT
Start: 2019-12-08 | End: 2019-12-12

## 2019-12-08 RX ADMIN — Medication 20 MILLIGRAM(S): at 05:40

## 2019-12-08 RX ADMIN — Medication 300 MILLIGRAM(S): at 05:47

## 2019-12-08 RX ADMIN — MEROPENEM 100 MILLIGRAM(S): 1 INJECTION INTRAVENOUS at 11:23

## 2019-12-08 RX ADMIN — Medication 250 MILLIGRAM(S): at 18:36

## 2019-12-08 RX ADMIN — ATORVASTATIN CALCIUM 20 MILLIGRAM(S): 80 TABLET, FILM COATED ORAL at 21:42

## 2019-12-08 RX ADMIN — Medication 300 MILLIGRAM(S): at 19:57

## 2019-12-08 RX ADMIN — Medication 2: at 09:17

## 2019-12-08 RX ADMIN — Medication 75 MILLIGRAM(S): at 05:39

## 2019-12-08 RX ADMIN — BUDESONIDE AND FORMOTEROL FUMARATE DIHYDRATE 2 PUFF(S): 160; 4.5 AEROSOL RESPIRATORY (INHALATION) at 05:39

## 2019-12-08 RX ADMIN — Medication 2: at 22:10

## 2019-12-08 RX ADMIN — SODIUM CHLORIDE 3 MILLILITER(S): 9 INJECTION INTRAMUSCULAR; INTRAVENOUS; SUBCUTANEOUS at 18:33

## 2019-12-08 RX ADMIN — ONDANSETRON 4 MILLIGRAM(S): 8 TABLET, FILM COATED ORAL at 15:02

## 2019-12-08 RX ADMIN — BUDESONIDE AND FORMOTEROL FUMARATE DIHYDRATE 2 PUFF(S): 160; 4.5 AEROSOL RESPIRATORY (INHALATION) at 18:35

## 2019-12-08 RX ADMIN — APIXABAN 5 MILLIGRAM(S): 2.5 TABLET, FILM COATED ORAL at 09:24

## 2019-12-08 RX ADMIN — PANTOPRAZOLE SODIUM 40 MILLIGRAM(S): 20 TABLET, DELAYED RELEASE ORAL at 06:42

## 2019-12-08 RX ADMIN — SODIUM CHLORIDE 25 MILLILITER(S): 9 INJECTION, SOLUTION INTRAVENOUS at 20:35

## 2019-12-08 RX ADMIN — Medication 3 MILLILITER(S): at 08:49

## 2019-12-08 RX ADMIN — Medication 3: at 18:38

## 2019-12-08 RX ADMIN — Medication 20 MILLIGRAM(S): at 14:17

## 2019-12-08 RX ADMIN — SODIUM CHLORIDE 3 MILLILITER(S): 9 INJECTION INTRAMUSCULAR; INTRAVENOUS; SUBCUTANEOUS at 05:47

## 2019-12-08 RX ADMIN — Medication 75 MILLIGRAM(S): at 18:34

## 2019-12-08 RX ADMIN — Medication 0.25 MILLIGRAM(S): at 06:42

## 2019-12-08 RX ADMIN — INSULIN GLARGINE 7 UNIT(S): 100 INJECTION, SOLUTION SUBCUTANEOUS at 20:35

## 2019-12-08 RX ADMIN — TIOTROPIUM BROMIDE 1 CAPSULE(S): 18 CAPSULE ORAL; RESPIRATORY (INHALATION) at 14:16

## 2019-12-08 RX ADMIN — APIXABAN 5 MILLIGRAM(S): 2.5 TABLET, FILM COATED ORAL at 21:42

## 2019-12-08 RX ADMIN — Medication 3: at 14:13

## 2019-12-08 RX ADMIN — Medication 3 MILLILITER(S): at 18:33

## 2019-12-08 RX ADMIN — Medication 20 MILLIGRAM(S): at 21:42

## 2019-12-08 RX ADMIN — MEROPENEM 100 MILLIGRAM(S): 1 INJECTION INTRAVENOUS at 21:41

## 2019-12-08 RX ADMIN — MONTELUKAST 10 MILLIGRAM(S): 4 TABLET, CHEWABLE ORAL at 21:42

## 2019-12-08 NOTE — PROGRESS NOTE ADULT - PROBLEM SELECTOR PLAN 2
- patient with persistent severe asthma  - as above, nebulizers and increased steroids to solumedrol 20 q 8 hrs  - patient on IL4 MAB q 2 weeks (Dupixent) --> due again in roughly 2 weeks

## 2019-12-08 NOTE — PROGRESS NOTE ADULT - PROBLEM SELECTOR PLAN 1
Flu A superimproved on tracheobronchomalacia flare / PNA  - patient presented with shortness of breath and borderline fever on steroids with flu A positivity   - duoneb PRN, budesonide nebs, spiriva and acapella, hypersal BID. inhaled tobramycin given recent bronchoscopy in Novemeber and proteus cultures. c/w solumedrol 20 q 8hrs,  meropenem/vanco per pulm given prior bronch cx results

## 2019-12-08 NOTE — PROGRESS NOTE ADULT - ASSESSMENT
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 She reports daily cough, productive of yellow to green sputum, no hemoptysis, associated with mild chest tightness. Recently had a bronchoscopy with Dr. Mcclellan at [facility] - sputum cultures grew pseudomonas, proteus and acinetobacter     Last admitted 8/2019 for asthma exacerbation 2/2 entero/rhinovirus respiratory infection and prior to that 2/2019 for coronavirus resp infection.   CXR: no acute patholgy  CT chest: new multiple small nodules, RUL and RML   VS- T 100, O2 Sats % on RA  Received Meropenem, 1L LR, Duonebs, Hydrocortisone   RVP - not sent      A/P:   Severe allergic asthma, recently started on Dupixent, Chronic steroid use  Chronic productive cough, changing color yellow to green. CT chest with new nodules RUL/RML - possible pneumonia/bronchiolitis. Also with acute onset GI symptoms - nausea/vomitting x7 times, nonbloody/nonbilious, osteomy output unchanged. CT abdomen unremarkable for acute process. Feeling better right now and resting comfortably  Flu A positive    Rec:  Sputum cultures pending  Steroids - Solumedrol 20 Q 8  c/w antibiotics - has multiple allergies, can c/w Meropenem and Vanco Tamiflu  f/u blood cultures  c/w Duonebs, Budesonide nebs, Kaz inhaled, hypersal inhaled  Acapella device  Supplemental O2, goal sats 92-96%  Monitor ostomy output and GI symptoms   DVT ppx    Dr. Allison to follow up      Attending Attestation:   I was physically present for the key portions of the evaluation and management (E/M) service provided.  I agree with the above history, physical, and plan which I have reviewed and edited where appropriate.     45 minutes spent on total encounter; more than 50% of the visit was spent counseling and/or coordinating care by the attending physician.     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 She reports daily cough, productive of yellow to green sputum, no hemoptysis, associated with mild chest tightness. Recently had a bronchoscopy with Dr. Mcclellan at [facility] - sputum cultures grew pseudomonas, proteus and acinetobacter     Last admitted 8/2019 for asthma exacerbation 2/2 entero/rhinovirus respiratory infection and prior to that 2/2019 for coronavirus resp infection.   CXR: no acute patholgy  CT chest: new multiple small nodules, RUL and RML   VS- T 100, O2 Sats % on RA  Received Meropenem, 1L LR, Duonebs, Hydrocortisone         A/P:   Severe allergic asthma, recently started on Dupixent, Chronic steroid use  Chronic productive cough, changing color yellow to green. CT chest with new nodules RUL/RML - possible pneumonia/bronchiolitis. Also with acute onset GI symptoms - nausea/vomitting x7 times, nonbloody/nonbilious, osteomy output unchanged. CT abdomen unremarkable for acute process. Feeling better right now and resting comfortably  Flu A positive    Rec:  Sputum cultures pending  Steroids - Solumedrol 20 Q 8  c/w antibiotics - has multiple allergies, would c/w Meropenem and Vanco as has a h/o severe lung disease and pseudomonas/proteus/acinetobacter in the sputum now with superimposed Influenza  Influenza A- c/w Tamiflu  f/u blood cultures  c/w Duonebs, Budesonide nebs, Kaz inhaled, hypersal inhaled  Acapella device  Supplemental O2, goal sats 92-96%  Monitor ostomy output and GI symptoms   DVT ppx    Dr. Allison to follow up      Attending Attestation:   I was physically present for the key portions of the evaluation and management (E/M) service provided.  I agree with the above history, physical, and plan which I have reviewed and edited where appropriate.     45 minutes spent on total encounter; more than 50% of the visit was spent counseling and/or coordinating care by the attending physician.     Plan discussed with patient and nurse

## 2019-12-08 NOTE — PROGRESS NOTE ADULT - PROBLEM SELECTOR PLAN 7
- patient with hx colorectal cancer s/p chemoXRT and resection now with diverting ileostomy, follows at the [facility]   - as per patient GEETHA

## 2019-12-08 NOTE — PROGRESS NOTE ADULT - SUBJECTIVE AND OBJECTIVE BOX
[facility] - Division of Pulmonary, Critical Care and Sleep Medicine   Please call [phone] between 8am-pm weekdays, [phone] after hours and weekends    Interval Events: Flu A+ and was started on Tamiflu. Febrile to 101.6      REVIEW OF SYSTEMS:  CV: [ ] chest pain   Resp: [ ] cough [ ] shortness of breath   [ ] All other systems negative  [ ] Unable to assess ROS because ________    OBJECTIVE:  ICU Vital Signs Last 24 Hrs  T(C): 36.8 (08 Dec 2019 07:49), Max: 38.7 (07 Dec 2019 17:45)  T(F): 98.3 (08 Dec 2019 07:49), Max: 101.6 (07 Dec 2019 17:45)  HR: 94 (08 Dec 2019 07:49) (92 - 110)  BP: 126/72 (08 Dec 2019 07:49) (109/53 - 146/76)  BP(mean): --  ABP: --  ABP(mean): --  RR: 18 (08 Dec 2019 07:49) (18 - 22)  SpO2: 96% (08 Dec 2019 07:49) (93% - 100%)         @ 07:01  -  12-08 @ 07:00  --------------------------------------------------------  IN: 520 mL / OUT: 800 mL / NET: -280 mL      CAPILLARY BLOOD GLUCOSE      POCT Blood Glucose.: 209 mg/dL (08 Dec 2019 09:08)      PHYSICAL EXAM:  General: NAD  HEENT: NC/AT  Lymph Nodes: no cervical or supraclavicular lymphadenopathy  Neck: supple  Respiratory:  CTA b/l, no wheezes, crackles or rhonchi  Cardiovascular:  RRR, no m/r/g  Abdomen: soft, NT/ND, +BS  Extremities: no clubbing, cyanosis or edema, warm  Skin: no rash  Neurological: AAOx3, non focal exam  Psychiatry: not anxious appearing, normal affect and mood    HOSPITAL MEDICATIONS:  MEDICATIONS  (STANDING):  apixaban 5 milliGRAM(s) Oral every 12 hours  atorvastatin 20 milliGRAM(s) Oral at bedtime  budesonide 160 MICROgram(s)/formoterol 4.5 MICROgram(s) Inhaler 2 Puff(s) Inhalation two times a day  dextrose 5%. 1000 milliLiter(s) (50 mL/Hr) IV Continuous <Continuous>  dextrose 50% Injectable 12.5 Gram(s) IV Push once  dextrose 50% Injectable 25 Gram(s) IV Push once  dextrose 50% Injectable 25 Gram(s) IV Push once  digoxin     Tablet 0.25 milliGRAM(s) Oral daily  insulin glargine Injectable (LANTUS) 7 Unit(s) SubCutaneous <User Schedule>  insulin lispro (HumaLOG) corrective regimen sliding scale   SubCutaneous three times a day before meals  insulin lispro (HumaLOG) corrective regimen sliding scale   SubCutaneous at bedtime  lactated ringers. 1000 milliLiter(s) (25 mL/Hr) IV Continuous <Continuous>  methylPREDNISolone sodium succinate Injectable 20 milliGRAM(s) IV Push every 8 hours  montelukast 10 milliGRAM(s) Oral at bedtime  oseltamivir 75 milliGRAM(s) Oral two times a day  pantoprazole    Tablet 40 milliGRAM(s) Oral before breakfast  sodium chloride 0.9% for Nebulization 3 milliLiter(s) Nebulizer two times a day  tiotropium 18 MICROgram(s) Capsule 1 Capsule(s) Inhalation daily  tobramycin for Nebulization 300 milliGRAM(s) Inhalation every 12 hours    MEDICATIONS  (PRN):  acetaminophen   Tablet .. 650 milliGRAM(s) Oral every 6 hours PRN Temp greater or equal to 38C (100.4F)  albuterol/ipratropium for Nebulization 3 milliLiter(s) Nebulizer every 6 hours PRN Shortness of Breath and/or Wheezing  dextrose 40% Gel 15 Gram(s) Oral once PRN Blood Glucose LESS THAN 70 milliGRAM(s)/deciliter  glucagon  Injectable 1 milliGRAM(s) IntraMuscular once PRN Glucose LESS THAN 70 milligrams/deciliter      LABS:                        12.0   8.10  )-----------( 226      ( 08 Dec 2019 09:22 )             40.0     Hgb Trend: 12.0<--, 12.4<--  12-08    137  |  97  |  10  ----------------------------<  240<H>  4.3   |  26  |  0.71    Ca    8.7      08 Dec 2019 06:25  Phos  4.1       Mg     2.0         TPro  6.7  /  Alb  4.0  /  TBili  0.2  /  DBili  x   /  AST  17  /  ALT  18  /  AlkPhos  75  12    Creatinine Trend: 0.71<--, 0.71<--  PT/INR - ( 08 Dec 2019 09:08 )   PT: 15.5 sec;   INR: 1.34 ratio         PTT - ( 08 Dec 2019 09:08 )  PTT:30.8 sec  Urinalysis Basic - ( 07 Dec 2019 05:38 )    Color: Light Yellow / Appearance: Clear / S.017 / pH: x  Gluc: x / Ketone: Negative  / Bili: Negative / Urobili: Negative   Blood: x / Protein: Trace / Nitrite: Negative   Leuk Esterase: Negative / RBC: 6 /hpf / WBC 4 /HPF   Sq Epi: x / Non Sq Epi: 1 /hpf / Bacteria: Negative        Venous Blood Gas:   @ 04:07  7.38/51/44/30/75  VBG Lactate: 1.6      MICROBIOLOGY:     RADIOLOGY:  [ ] Reviewed and interpreted by me [facility] - Division of Pulmonary, Critical Care and Sleep Medicine   Please call [phone] between 8am-pm weekdays, [phone] after hours and weekends    Interval Events: Flu A+ and was started on Tamiflu. Febrile to 101.6  +cough, wheezing and SOB but overall states she is feeling better. nausea has resolved, no further vomitting.       REVIEW OF SYSTEMS:  CV: [- ] chest pain   Resp: [ +] cough [+ ] shortness of breath   [x ] All other systems negative  [ ] Unable to assess ROS because ________    OBJECTIVE:  ICU Vital Signs Last 24 Hrs  T(C): 36.8 (08 Dec 2019 07:49), Max: 38.7 (07 Dec 2019 17:45)  T(F): 98.3 (08 Dec 2019 07:49), Max: 101.6 (07 Dec 2019 17:45)  HR: 94 (08 Dec 2019 07:49) (92 - 110)  BP: 126/72 (08 Dec 2019 07:49) (109/53 - 146/76)  BP(mean): --  ABP: --  ABP(mean): --  RR: 18 (08 Dec 2019 07:49) (18 - 22)  SpO2: 96% (08 Dec 2019 07:49) (93% - 100%)         @ 07:01  -   @ 07:00  --------------------------------------------------------  IN: 520 mL / OUT: 800 mL / NET: -280 mL      CAPILLARY BLOOD GLUCOSE      POCT Blood Glucose.: 209 mg/dL (08 Dec 2019 09:08)      PHYSICAL EXAM:  General: NAD  HEENT: NC/AT  Lymph Nodes: no cervical or supraclavicular lymphadenopathy  Neck: supple  Respiratory:  distant bs, moving air throughout with end exp wheezes  Cardiovascular:  RRR, no m/r/g  Abdomen: soft, NT/ND, +BS  Extremities: no clubbing, cyanosis or edema, warm  Skin: no rash  Neurological: AAOx3, non focal exam  Psychiatry: not anxious appearing, normal affect and mood    HOSPITAL MEDICATIONS:  MEDICATIONS  (STANDING):  apixaban 5 milliGRAM(s) Oral every 12 hours  atorvastatin 20 milliGRAM(s) Oral at bedtime  budesonide 160 MICROgram(s)/formoterol 4.5 MICROgram(s) Inhaler 2 Puff(s) Inhalation two times a day  dextrose 5%. 1000 milliLiter(s) (50 mL/Hr) IV Continuous <Continuous>  dextrose 50% Injectable 12.5 Gram(s) IV Push once  dextrose 50% Injectable 25 Gram(s) IV Push once  dextrose 50% Injectable 25 Gram(s) IV Push once  digoxin     Tablet 0.25 milliGRAM(s) Oral daily  insulin glargine Injectable (LANTUS) 7 Unit(s) SubCutaneous <User Schedule>  insulin lispro (HumaLOG) corrective regimen sliding scale   SubCutaneous three times a day before meals  insulin lispro (HumaLOG) corrective regimen sliding scale   SubCutaneous at bedtime  lactated ringers. 1000 milliLiter(s) (25 mL/Hr) IV Continuous <Continuous>  methylPREDNISolone sodium succinate Injectable 20 milliGRAM(s) IV Push every 8 hours  montelukast 10 milliGRAM(s) Oral at bedtime  oseltamivir 75 milliGRAM(s) Oral two times a day  pantoprazole    Tablet 40 milliGRAM(s) Oral before breakfast  sodium chloride 0.9% for Nebulization 3 milliLiter(s) Nebulizer two times a day  tiotropium 18 MICROgram(s) Capsule 1 Capsule(s) Inhalation daily  tobramycin for Nebulization 300 milliGRAM(s) Inhalation every 12 hours    MEDICATIONS  (PRN):  acetaminophen   Tablet .. 650 milliGRAM(s) Oral every 6 hours PRN Temp greater or equal to 38C (100.4F)  albuterol/ipratropium for Nebulization 3 milliLiter(s) Nebulizer every 6 hours PRN Shortness of Breath and/or Wheezing  dextrose 40% Gel 15 Gram(s) Oral once PRN Blood Glucose LESS THAN 70 milliGRAM(s)/deciliter  glucagon  Injectable 1 milliGRAM(s) IntraMuscular once PRN Glucose LESS THAN 70 milligrams/deciliter      LABS:                        12.0   8.10  )-----------( 226      ( 08 Dec 2019 09:22 )             40.0     Hgb Trend: 12.0<--, 12.4<--  1208    137  |  97  |  10  ----------------------------<  240<H>  4.3   |  26  |  0.71    Ca    8.7      08 Dec 2019 06:25  Phos  4.1       Mg     2.0         TPro  6.7  /  Alb  4.0  /  TBili  0.2  /  DBili  x   /  AST  17  /  ALT  18  /  AlkPhos  75      Creatinine Trend: 0.71<--, 0.71<--  PT/INR - ( 08 Dec 2019 09:08 )   PT: 15.5 sec;   INR: 1.34 ratio         PTT - ( 08 Dec 2019 09:08 )  PTT:30.8 sec  Urinalysis Basic - ( 07 Dec 2019 05:38 )    Color: Light Yellow / Appearance: Clear / S.017 / pH: x  Gluc: x / Ketone: Negative  / Bili: Negative / Urobili: Negative   Blood: x / Protein: Trace / Nitrite: Negative   Leuk Esterase: Negative / RBC: 6 /hpf / WBC 4 /HPF   Sq Epi: x / Non Sq Epi: 1 /hpf / Bacteria: Negative        Venous Blood Gas:   @ 04:07  7.38/51/44/30/75  VBG Lactate: 1.6      MICROBIOLOGY:     RADIOLOGY:  [x ] Reviewed and interpreted by me

## 2019-12-08 NOTE — PROGRESS NOTE ADULT - SUBJECTIVE AND OBJECTIVE BOX
[facility] Division of Hospital Medicine  Dejuan Lema MD  Pager (M-F, 0H-0V): [phone]  Other Times:  [phone]    Patient is a 69y old  Female who presents with a chief complaint of vomiting and shortness of breath (08 Dec 2019 09:42)      SUBJECTIVE / OVERNIGHT EVENTS: dyspnea improving  ADDITIONAL REVIEW OF SYSTEMS:    MEDICATIONS  (STANDING):  apixaban 5 milliGRAM(s) Oral every 12 hours  atorvastatin 20 milliGRAM(s) Oral at bedtime  budesonide 160 MICROgram(s)/formoterol 4.5 MICROgram(s) Inhaler 2 Puff(s) Inhalation two times a day  dextrose 5%. 1000 milliLiter(s) (50 mL/Hr) IV Continuous <Continuous>  dextrose 50% Injectable 12.5 Gram(s) IV Push once  dextrose 50% Injectable 25 Gram(s) IV Push once  dextrose 50% Injectable 25 Gram(s) IV Push once  digoxin     Tablet 0.25 milliGRAM(s) Oral daily  insulin glargine Injectable (LANTUS) 7 Unit(s) SubCutaneous <User Schedule>  insulin lispro (HumaLOG) corrective regimen sliding scale   SubCutaneous three times a day before meals  insulin lispro (HumaLOG) corrective regimen sliding scale   SubCutaneous at bedtime  lactated ringers. 1000 milliLiter(s) (25 mL/Hr) IV Continuous <Continuous>  meropenem  IVPB      meropenem  IVPB 1000 milliGRAM(s) IV Intermittent every 8 hours  methylPREDNISolone sodium succinate Injectable 20 milliGRAM(s) IV Push every 8 hours  montelukast 10 milliGRAM(s) Oral at bedtime  oseltamivir 75 milliGRAM(s) Oral two times a day  pantoprazole    Tablet 40 milliGRAM(s) Oral before breakfast  sodium chloride 0.9% for Nebulization 3 milliLiter(s) Nebulizer two times a day  tiotropium 18 MICROgram(s) Capsule 1 Capsule(s) Inhalation daily  tobramycin for Nebulization 300 milliGRAM(s) Inhalation every 12 hours  vancomycin  IVPB 1000 milliGRAM(s) IV Intermittent every 12 hours    MEDICATIONS  (PRN):  acetaminophen   Tablet .. 650 milliGRAM(s) Oral every 6 hours PRN Temp greater or equal to 38C (100.4F)  albuterol/ipratropium for Nebulization 3 milliLiter(s) Nebulizer every 6 hours PRN Shortness of Breath and/or Wheezing  dextrose 40% Gel 15 Gram(s) Oral once PRN Blood Glucose LESS THAN 70 milliGRAM(s)/deciliter  glucagon  Injectable 1 milliGRAM(s) IntraMuscular once PRN Glucose LESS THAN 70 milligrams/deciliter  ondansetron Injectable 4 milliGRAM(s) IV Push every 8 hours PRN Nausea and/or Vomiting      CAPILLARY BLOOD GLUCOSE      POCT Blood Glucose.: 285 mg/dL (08 Dec 2019 13:37)  POCT Blood Glucose.: 209 mg/dL (08 Dec 2019 09:08)  POCT Blood Glucose.: 103 mg/dL (07 Dec 2019 21:23)    I&O's Summary    07 Dec 2019 07:01  -  08 Dec 2019 07:00  --------------------------------------------------------  IN: 520 mL / OUT: 800 mL / NET: -280 mL    08 Dec 2019 07:01  -  08 Dec 2019 15:04  --------------------------------------------------------  IN: 570 mL / OUT: 0 mL / NET: 570 mL        PHYSICAL EXAM:  Vital Signs Last 24 Hrs  T(C): 36.8 (08 Dec 2019 07:49), Max: 38.7 (07 Dec 2019 17:45)  T(F): 98.3 (08 Dec 2019 07:49), Max: 101.6 (07 Dec 2019 17:45)  HR: 94 (08 Dec 2019 07:49) (92 - 110)  BP: 126/72 (08 Dec 2019 07:49) (121/70 - 146/76)  BP(mean): --  RR: 18 (08 Dec 2019 07:49) (18 - 20)  SpO2: 96% (08 Dec 2019 07:49) (94% - 100%)  General: elderly female, on NC NAD  	HEENT: PERRL, EOMI, Dry MM  	Neck: Supple, no JVD  	Cardiac: right chest wall port, site dry and intact. RRR, normal s1 and s2, faint systolic murmur   	Lungs: poor inspiratory effort, CTAB up until the mid lung with decreased breath sounds on the right middle and right lower lung   	Abdomen: soft, nondistended abdomen. Bowel sounds present. non TTP. Ostomy site intact with minimal output from ostomy  	Extremities: no cyanosis, pitting or edema. Palpable pulses   Neuro: AAO x 3, nonfocal    LABS:                        12.0   8.10  )-----------( 226      ( 08 Dec 2019 09:22 )             40.0     12    137  |  97  |  10  ----------------------------<  240<H>  4.3   |  26  |  0.71    Ca    8.7      08 Dec 2019 06:25  Phos  4.1       Mg     2.0         TPro  6.7  /  Alb  4.0  /  TBili  0.2  /  DBili  x   /  AST  17  /  ALT  18  /  AlkPhos  75  12    PT/INR - ( 08 Dec 2019 09:08 )   PT: 15.5 sec;   INR: 1.34 ratio         PTT - ( 08 Dec 2019 09:08 )  PTT:30.8 sec      Urinalysis Basic - ( 07 Dec 2019 05:38 )    Color: Light Yellow / Appearance: Clear / S.017 / pH: x  Gluc: x / Ketone: Negative  / Bili: Negative / Urobili: Negative   Blood: x / Protein: Trace / Nitrite: Negative   Leuk Esterase: Negative / RBC: 6 /hpf / WBC 4 /HPF   Sq Epi: x / Non Sq Epi: 1 /hpf / Bacteria: Negative        Culture - Sputum (collected 08 Dec 2019 00:51)  Source: .Sputum Sputum  Gram Stain (08 Dec 2019 04:12):    Few polymorphonuclear leukocytes per low power field    Numerous Squamous epithelial cells per low power field    Moderate Yeast like cells seen per oil power field    Few Gram Negative Rods seen per oil power field    Culture - Urine (collected 07 Dec 2019 10:07)  Source: .Urine Clean Catch (Midstream)  Final Report (08 Dec 2019 09:10):    <10,000 CFU/mL Normal Urogenital Jessica    Culture - Blood (collected 07 Dec 2019 10:01)  Source: .Blood Blood-Peripheral  Preliminary Report (08 Dec 2019 11:01):    No growth to date.    Culture - Blood (collected 07 Dec 2019 06:34)  Source: .Blood Blood-Peripheral  Preliminary Report (08 Dec 2019 07:01):    No growth to date.        RADIOLOGY & ADDITIONAL TESTS:  Results Reviewed:   Imaging Personally Reviewed:  Electrocardiogram Personally Reviewed:    COORDINATION OF CARE: pulm recs noted  Care Discussed with Consultants/Other Providers [Y/N]:  Prior or Outpatient Records Reviewed [Y/N]:

## 2019-12-09 DIAGNOSIS — R06.02 SHORTNESS OF BREATH: ICD-10-CM

## 2019-12-09 DIAGNOSIS — E11.9 TYPE 2 DIABETES MELLITUS WITHOUT COMPLICATIONS: ICD-10-CM

## 2019-12-09 DIAGNOSIS — Z29.9 ENCOUNTER FOR PROPHYLACTIC MEASURES, UNSPECIFIED: ICD-10-CM

## 2019-12-09 DIAGNOSIS — J18.9 PNEUMONIA, UNSPECIFIED ORGANISM: ICD-10-CM

## 2019-12-09 DIAGNOSIS — D84.9 IMMUNODEFICIENCY, UNSPECIFIED: ICD-10-CM

## 2019-12-09 DIAGNOSIS — J10.1 INFLUENZA DUE TO OTHER IDENTIFIED INFLUENZA VIRUS WITH OTHER RESPIRATORY MANIFESTATIONS: ICD-10-CM

## 2019-12-09 LAB
ANION GAP SERPL CALC-SCNC: 10 MMOL/L — SIGNIFICANT CHANGE UP (ref 5–17)
BUN SERPL-MCNC: 15 MG/DL — SIGNIFICANT CHANGE UP (ref 7–23)
CALCIUM SERPL-MCNC: 9 MG/DL — SIGNIFICANT CHANGE UP (ref 8.4–10.5)
CHLORIDE SERPL-SCNC: 97 MMOL/L — SIGNIFICANT CHANGE UP (ref 96–108)
CO2 SERPL-SCNC: 29 MMOL/L — SIGNIFICANT CHANGE UP (ref 22–31)
CREAT SERPL-MCNC: 0.62 MG/DL — SIGNIFICANT CHANGE UP (ref 0.5–1.3)
CULTURE RESULTS: SIGNIFICANT CHANGE UP
GLUCOSE BLDC GLUCOMTR-MCNC: 256 MG/DL — HIGH (ref 70–99)
GLUCOSE BLDC GLUCOMTR-MCNC: 271 MG/DL — HIGH (ref 70–99)
GLUCOSE BLDC GLUCOMTR-MCNC: 298 MG/DL — HIGH (ref 70–99)
GLUCOSE BLDC GLUCOMTR-MCNC: 312 MG/DL — HIGH (ref 70–99)
GLUCOSE SERPL-MCNC: 377 MG/DL — HIGH (ref 70–99)
HCT VFR BLD CALC: 38 % — SIGNIFICANT CHANGE UP (ref 34.5–45)
HGB BLD-MCNC: 11.5 G/DL — SIGNIFICANT CHANGE UP (ref 11.5–15.5)
MCHC RBC-ENTMCNC: 23.1 PG — LOW (ref 27–34)
MCHC RBC-ENTMCNC: 30.3 GM/DL — LOW (ref 32–36)
MCV RBC AUTO: 76.3 FL — LOW (ref 80–100)
PLATELET # BLD AUTO: 238 K/UL — SIGNIFICANT CHANGE UP (ref 150–400)
POTASSIUM SERPL-MCNC: 5 MMOL/L — SIGNIFICANT CHANGE UP (ref 3.5–5.3)
POTASSIUM SERPL-SCNC: 5 MMOL/L — SIGNIFICANT CHANGE UP (ref 3.5–5.3)
RBC # BLD: 4.98 M/UL — SIGNIFICANT CHANGE UP (ref 3.8–5.2)
RBC # FLD: 17 % — HIGH (ref 10.3–14.5)
SODIUM SERPL-SCNC: 136 MMOL/L — SIGNIFICANT CHANGE UP (ref 135–145)
SPECIMEN SOURCE: SIGNIFICANT CHANGE UP
WBC # BLD: 6.22 K/UL — SIGNIFICANT CHANGE UP (ref 3.8–10.5)
WBC # FLD AUTO: 6.22 K/UL — SIGNIFICANT CHANGE UP (ref 3.8–10.5)

## 2019-12-09 PROCEDURE — 99233 SBSQ HOSP IP/OBS HIGH 50: CPT

## 2019-12-09 PROCEDURE — 93308 TTE F-UP OR LMTD: CPT | Mod: 26

## 2019-12-09 PROCEDURE — 99232 SBSQ HOSP IP/OBS MODERATE 35: CPT

## 2019-12-09 RX ORDER — CHLORHEXIDINE GLUCONATE 213 G/1000ML
1 SOLUTION TOPICAL DAILY
Refills: 0 | Status: DISCONTINUED | OUTPATIENT
Start: 2019-12-09 | End: 2019-12-12

## 2019-12-09 RX ADMIN — Medication 300 MILLIGRAM(S): at 20:52

## 2019-12-09 RX ADMIN — PANTOPRAZOLE SODIUM 40 MILLIGRAM(S): 20 TABLET, DELAYED RELEASE ORAL at 04:54

## 2019-12-09 RX ADMIN — Medication 1: at 21:59

## 2019-12-09 RX ADMIN — SODIUM CHLORIDE 3 MILLILITER(S): 9 INJECTION INTRAMUSCULAR; INTRAVENOUS; SUBCUTANEOUS at 04:55

## 2019-12-09 RX ADMIN — Medication 3 MILLILITER(S): at 16:55

## 2019-12-09 RX ADMIN — Medication 75 MILLIGRAM(S): at 04:54

## 2019-12-09 RX ADMIN — INSULIN GLARGINE 7 UNIT(S): 100 INJECTION, SOLUTION SUBCUTANEOUS at 17:33

## 2019-12-09 RX ADMIN — MEROPENEM 100 MILLIGRAM(S): 1 INJECTION INTRAVENOUS at 14:11

## 2019-12-09 RX ADMIN — APIXABAN 5 MILLIGRAM(S): 2.5 TABLET, FILM COATED ORAL at 09:22

## 2019-12-09 RX ADMIN — ATORVASTATIN CALCIUM 20 MILLIGRAM(S): 80 TABLET, FILM COATED ORAL at 21:59

## 2019-12-09 RX ADMIN — BUDESONIDE AND FORMOTEROL FUMARATE DIHYDRATE 2 PUFF(S): 160; 4.5 AEROSOL RESPIRATORY (INHALATION) at 04:56

## 2019-12-09 RX ADMIN — Medication 3: at 18:31

## 2019-12-09 RX ADMIN — Medication 75 MILLIGRAM(S): at 17:33

## 2019-12-09 RX ADMIN — BUDESONIDE AND FORMOTEROL FUMARATE DIHYDRATE 2 PUFF(S): 160; 4.5 AEROSOL RESPIRATORY (INHALATION) at 17:33

## 2019-12-09 RX ADMIN — Medication 300 MILLIGRAM(S): at 05:49

## 2019-12-09 RX ADMIN — Medication 3: at 14:12

## 2019-12-09 RX ADMIN — TIOTROPIUM BROMIDE 1 CAPSULE(S): 18 CAPSULE ORAL; RESPIRATORY (INHALATION) at 14:12

## 2019-12-09 RX ADMIN — Medication 20 MILLIGRAM(S): at 14:11

## 2019-12-09 RX ADMIN — MEROPENEM 100 MILLIGRAM(S): 1 INJECTION INTRAVENOUS at 04:52

## 2019-12-09 RX ADMIN — APIXABAN 5 MILLIGRAM(S): 2.5 TABLET, FILM COATED ORAL at 21:59

## 2019-12-09 RX ADMIN — Medication 250 MILLIGRAM(S): at 04:53

## 2019-12-09 RX ADMIN — Medication 20 MILLIGRAM(S): at 04:55

## 2019-12-09 RX ADMIN — MONTELUKAST 10 MILLIGRAM(S): 4 TABLET, CHEWABLE ORAL at 21:59

## 2019-12-09 RX ADMIN — SODIUM CHLORIDE 25 MILLILITER(S): 9 INJECTION, SOLUTION INTRAVENOUS at 09:22

## 2019-12-09 RX ADMIN — SODIUM CHLORIDE 3 MILLILITER(S): 9 INJECTION INTRAMUSCULAR; INTRAVENOUS; SUBCUTANEOUS at 17:33

## 2019-12-09 RX ADMIN — Medication 4: at 09:21

## 2019-12-09 RX ADMIN — Medication 0.25 MILLIGRAM(S): at 04:54

## 2019-12-09 RX ADMIN — Medication 20 MILLIGRAM(S): at 21:59

## 2019-12-09 NOTE — PROGRESS NOTE ADULT - SUBJECTIVE AND OBJECTIVE BOX
CHIEF COMPLAINT: f/up sob, influenza, asthma, TBM, allergy---better today-different than her normal    Interval Events: isolation room    REVIEW OF SYSTEMS:  Constitutional: No fevers or chills. No weight loss. + fatigue or generalized malaise.  Eyes: No itching or discharge from the eyes  ENT: No ear pain. No ear discharge. No nasal congestion. No post nasal drip. No epistaxis. No throat pain. No sore throat. No difficulty swallowing.   CV: No chest pain. No palpitations. No lightheadedness or dizziness.   Resp: No dyspnea at rest. + dyspnea on exertion. No orthopnea. + wheezing. + cough. No stridor. No sputum production. No chest pain with respiration.  GI: No nausea. No vomiting. No diarrhea.  MSK: No joint pain or pain in any extremities  Integumentary: No skin lesions. No pedal edema.  Neurological: No gross motor weakness. No sensory changes.  [+ ] All other systems negative  [ ] Unable to assess ROS because ________    OBJECTIVE:  ICU Vital Signs Last 24 Hrs  T(C): 36.8 (09 Dec 2019 04:55), Max: 37.5 (08 Dec 2019 16:20)  T(F): 98.2 (09 Dec 2019 04:55), Max: 99.5 (08 Dec 2019 16:20)  HR: 60 (09 Dec 2019 04:55) (60 - 94)  BP: 126/71 (09 Dec 2019 04:55) (120/63 - 129/71)  BP(mean): --  ABP: --  ABP(mean): --  RR: 18 (09 Dec 2019 04:55) (16 - 18)  SpO2: 91% (09 Dec 2019 04:55) (91% - 96%)        12-07 @ 07:01  -  12-08 @ 07:00  --------------------------------------------------------  IN: 520 mL / OUT: 800 mL / NET: -280 mL    12-08 @ 07:01  -  12-09 @ 05:58  --------------------------------------------------------  IN: 1020 mL / OUT: 0 mL / NET: 1020 mL      CAPILLARY BLOOD GLUCOSE      POCT Blood Glucose.: 307 mg/dL (08 Dec 2019 21:55)      PHYSICAL EXAM: NAD in bed   General: Awake, alert, oriented X 3.   HEENT: Atraumatic, normocephalic.                 Mallampatti Grade 3                No nasal congestion.                No tonsillar or pharyngeal exudates.  Lymph Nodes: No palpable lymphadenopathy  Neck: No JVD. No carotid bruit.   Respiratory: Normal chest expansion                         Normal percussion                         Normal and equal air entry                         ++ wheeze, no rhonchi or rales.  Cardiovascular: S1 S2 normal. No murmurs, rubs or gallops.   Abdomen: Soft, non-tender, non-distended. No organomegaly. Normoactive bowel sounds.  Extremities: Warm to touch. Peripheral pulse palpable. No pedal edema.   Skin: No rashes or skin lesions  Neurological: Motor and sensory examination equal and normal in all four extremities.  Psychiatry: Appropriate mood and affect.    HOSPITAL MEDICATIONS:  MEDICATIONS  (STANDING):  apixaban 5 milliGRAM(s) Oral every 12 hours  atorvastatin 20 milliGRAM(s) Oral at bedtime  budesonide 160 MICROgram(s)/formoterol 4.5 MICROgram(s) Inhaler 2 Puff(s) Inhalation two times a day  dextrose 5%. 1000 milliLiter(s) (50 mL/Hr) IV Continuous <Continuous>  dextrose 50% Injectable 12.5 Gram(s) IV Push once  dextrose 50% Injectable 25 Gram(s) IV Push once  dextrose 50% Injectable 25 Gram(s) IV Push once  digoxin     Tablet 0.25 milliGRAM(s) Oral daily  insulin glargine Injectable (LANTUS) 7 Unit(s) SubCutaneous <User Schedule>  insulin lispro (HumaLOG) corrective regimen sliding scale   SubCutaneous three times a day before meals  insulin lispro (HumaLOG) corrective regimen sliding scale   SubCutaneous at bedtime  lactated ringers. 1000 milliLiter(s) (25 mL/Hr) IV Continuous <Continuous>  meropenem  IVPB      meropenem  IVPB 1000 milliGRAM(s) IV Intermittent every 8 hours  methylPREDNISolone sodium succinate Injectable 20 milliGRAM(s) IV Push every 8 hours  montelukast 10 milliGRAM(s) Oral at bedtime  oseltamivir 75 milliGRAM(s) Oral two times a day  pantoprazole    Tablet 40 milliGRAM(s) Oral before breakfast  sodium chloride 0.9% for Nebulization 3 milliLiter(s) Nebulizer two times a day  tiotropium 18 MICROgram(s) Capsule 1 Capsule(s) Inhalation daily  tobramycin for Nebulization 300 milliGRAM(s) Inhalation every 12 hours  vancomycin  IVPB 1000 milliGRAM(s) IV Intermittent every 12 hours    MEDICATIONS  (PRN):  acetaminophen   Tablet .. 650 milliGRAM(s) Oral every 6 hours PRN Temp greater or equal to 38C (100.4F)  albuterol/ipratropium for Nebulization 3 milliLiter(s) Nebulizer every 6 hours PRN Shortness of Breath and/or Wheezing  dextrose 40% Gel 15 Gram(s) Oral once PRN Blood Glucose LESS THAN 70 milliGRAM(s)/deciliter  glucagon  Injectable 1 milliGRAM(s) IntraMuscular once PRN Glucose LESS THAN 70 milligrams/deciliter  ondansetron Injectable 4 milliGRAM(s) IV Push every 8 hours PRN Nausea and/or Vomiting      LABS:                        12.0   8.10  )-----------( 226      ( 08 Dec 2019 09:22 )             40.0     12-08    137  |  97  |  10  ----------------------------<  240<H>  4.3   |  26  |  0.71    Ca    8.7      08 Dec 2019 06:25  Phos  4.1     12-08  Mg     2.0     12-08      PT/INR - ( 08 Dec 2019 09:08 )   PT: 15.5 sec;   INR: 1.34 ratio         PTT - ( 08 Dec 2019 09:08 )  PTT:30.8 sec          MICROBIOLOGY:     RADIOLOGY:   < from: CT Chest No Cont (12.07.19 @ 09:05) >  CHEST:     LUNGS AND LARGE AIRWAYS: Mild dependent secretions in the right mainstem   bronchus and bronchus intermedius. Mild paraseptal emphysema. Unchanged 4   mm nodules in the right lower and middle lobes (2:57 and 2:56), stable   from 10/22/2019. New 4 mm right upper lobe nodule (2:22). New 2 mm right   middle lobe opacity (2:39).  PLEURA: No pleural effusion.  VESSELS: Atherosclerotic calcifications of the aorta and coronary   arteries. Mildly dilated main pulmonary artery which can be seen in   setting of pulmonary arterial hypertension.  HEART: Heart size is normal. Trace pericardial effusion.  MEDIASTINUM AND MARANDA: No lymphadenopathy.  CHEST WALL AND LOWER NECK: Right Mediport catheter with tip in the SVC.   Small droplets of gas are noted in the subcutaneous tissues in the right   anterior chest adjacent to the Mediport. Correlation with  any history of   recent instrumentation is suggested.    ABDOMEN AND PELVIS:    LIVER: Within normal limits.  BILE DUCTS: Normal caliber.  GALLBLADDER: Within normal limits.  SPLEEN: Within normal limits.  PANCREAS: Cystic lesions in the pancreatic tail, the larger measuring 2.2   cm (2:76) are unchanged since 10/22/2019.  ADRENALS: Adrenal glands are diminutive bilaterally.  KIDNEYS/URETERS: No hydronephrosis. Hypodense foci in the left kidney not   well evaluated without intravenous contrast.    BLADDER: Within normal limits.  REPRODUCTIVE ORGANS: Status post hysterectomy.    BOWEL: No bowel obstruction. Status post abdominal perineal resection   with left lower quadrant colostomy. Moderate fecal material in the colon.   Small hiatal hernia.  PERITONEUM: No ascites.  VESSELS: Atherosclerotic calcification.  RETROPERITONEUM/LYMPH NODES: No lymphadenopathy.    ABDOMINAL WALL: Within normal limits.  BONES: Status post plate and screw fixation of the symphysis pubis and   sacrum. Degenerative changes in the spine. Heterogeneous appearance of   the vertebral bodies, unchanged. Sclerotic foci in the bones of the   pelvis, unchanged.    IMPRESSION:     No bowel obstruction. Moderate fecal material in the colon.    Subcentimeter nodular opacities in the right middle and lower lobes again   noted. Several new subcentimeter nodules in the right upper and middle   lobes, of uncertain etiology.    Heterogeneous appearance of the vertebral bodies, unchanged.    Cystic lesions in the pancreatic tail, unchanged.                < end of copied text >  [ ] Reviewed and interpreted by me    Point of Care Ultrasound Findings:    PFT:    EKG:

## 2019-12-09 NOTE — CHART NOTE - NSCHARTNOTEFT_GEN_A_CORE
Allergy/Immunology Chart Note    Was called by primary team regarding immune work up for Ms. Cuello. Patient was seen in 2017 while inpatient as well as in outpatient Allergy/Immunology clinic by Dr. Romero for the same. Workup for primary immunodeficiency was completed at that time:    2016 --> Ig; IgA: 625; IgM: 220; IgE: 194;   2016 --> Ig; IgA: 522; IgM: 154  Normal Full T cell subset, CH50, AH50, MBL, Tryptase, MPO, G6PD, NBT, normal mitogens and antigens  Titers: tetanus sluggish, non-protective diptheria, non-protective strep titers, non-protective HIB, non-protective mumps, protective Measles, protective Rubella. Later received booster vaccines for Hib and Strep pneumo and titers were rechecked and she had adequate response, making specific antibody deficiency less likely  Negative HIV    She has not been seen in clinic since 2017.     Plan:  Discussed patient with on-call attending Dr. Tawny Saavedra. We would recommend that when patient is discharged and well she follow up in Allergy/Immunology clinic for repeat immunoglobulins and to address her multiple medication allergies. Checking her immunoglobulins while she is inpatient is not indicated at this time as they will likely be low given her current illness.     Follow up with Allergy and Immunology clinic with Dr. Garrett Romero (immunologist who saw her last) after discharge by calling [phone]. We are located at [address] [place] [address], [address]. Allergy/Immunology Chart Note    Was called by primary team regarding immune work up for Ms. Cuello. Patient was seen in 2017 while inpatient as well as in outpatient Allergy/Immunology clinic by Dr. Romero for the same. Workup for primary immunodeficiency was completed at that time:    2016 --> Ig; IgA: 625; IgM: 220; IgE: 194;   2016 --> Ig; IgA: 522; IgM: 154  Normal Full T cell subset, CH50, AH50, MBL, Tryptase, MPO, G6PD, NBT, normal mitogens and antigens  Titers: tetanus sluggish, non-protective diptheria, non-protective strep titers, non-protective HIB, non-protective mumps, protective Measles, protective Rubella. Later received booster vaccines for Hib and Strep pneumo and titers were rechecked and she had adequate response, making specific antibody deficiency less likely  Negative HIV    She has not been seen in clinic since 2017.     Plan:  Discussed patient with on-call attending Dr. Tawny Saavedra. We would recommend that when patient is discharged and well she follow up in Allergy/Immunology clinic for repeat immunoglobulins and to address her multiple medication allergies. Checking her immunoglobulins while she is inpatient is not indicated at this time as they will likely be low given her current illness.     Follow up with Allergy and Immunology clinic with Dr. Garrett Romero (immunologist who saw her last) after discharge and Dr Saavedar to address medication allergies. Phone- [phone]. We are located at [address] [place] [address].

## 2019-12-09 NOTE — PROGRESS NOTE ADULT - ASSESSMENT
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 She reports daily cough, productive of yellow to green sputum, no hemoptysis, associated with mild chest tightness. Recently had a bronchoscopy with Dr. Mcclellan at [facility] - sputum cultures grew pseudomonas, proteus and acinetobacter     Last admitted 8/2019 for asthma exacerbation 2/2 entero/rhinovirus respiratory infection and prior to that 2/2019 for coronavirus resp infection. CT chest: new multiple small nodules, RUL and RML   A/P:   Severe allergic asthma, recently started on Dupixent, Chronic steroid use c/w influenza A  **********************  12/9- better over all

## 2019-12-09 NOTE — PROGRESS NOTE ADULT - ASSESSMENT
69 year old female with hx of tracheobronchomalacia s/p tracheobronchoplasty in October, on chronic low dose prednisone with subsequent adrenal insufficiency, severe allergic asthma on dupilumuab (IL4 antagonist, MAB), bronchiectasis on inhaled tobramycin (started two weeks ago), lung nodules, A-fib on eliquis, T2DM, HTN, squamous cell CA of anus s/p chemo/XRT in 2017, and a procectomy with a diverting ostomy in 7/2018 who presents today with shortness of breath and vomiting found to have influenza

## 2019-12-09 NOTE — CONSULT NOTE ADULT - SUBJECTIVE AND OBJECTIVE BOX
ID CONSULTATION--Sergio Lai MD  Pager [phone]    Patient is a 69y old  Female who presents with a chief complaint of vomiting and shortness of breath (09 Dec 2019 05:57)    HPI:  This is a 69 year old female with hx of tracheobronchomalacia s/p tracheobronchoplasty in October, on chronic low dose prednisone bronchiectasis on inhaled tobramycin (started two weeks ago), lung nodules, T2DM, HTN, squamous cell CA of anus s/p chemo/XRT in 2017, and a procectomy with a diverting ostomy in 7/2018 who presents with shortness of breath and vomiting. Patient states that she was seen two weeks ago and was started on inhaled tobramycin, and methylprednisolone 8mg BID in Dr. Allison's office. Patient was then seen by her cardiologist who started her on ceftin for concern for PNA.     RVP + for influenza A  Now on tamiflu and vanco and meropenem    In the ED:  Tmax of 100 HR 60s-90s -152/59-94 RR 18-22 on 4L      PAST MEDICAL & SURGICAL HISTORY:  TIA (transient ischemic attack): multiple, last 5 years ago - presents as right-sided weakness  DVT (deep venous thrombosis): 15-20 years ago, took coumadin  Seizure: x 1 1/7/18  Rectal bleeding  Colorectal cancer: 4/2018- last treatment , chemo and radiation  Tracheobronchomalacia: diagnosed 2015, s/p bronchial thermoplasty 2016 (Dr Zapien); recent bronchoscopy 6/5/2018 revealed no evidence of tracheobronchomalacia in trachea or bronchial tubes  Asthma  Pelvic fracture  Aortic insufficiency: moderate AR on echo 5/3/2018  Adrenal insufficiency: Medrol daily for over 50 years  COPD (chronic obstructive pulmonary disease)  Diabetes: Type 2  Atrial fibrillation: paroxysmal, on eliquis  Rectal bleeding: exam under anesthesia ([facility]) 2/2018  S/P bronchoscopy: 6/5/2018 - [facility] (Dr Zapien) no evidence of tracheobronchomalacia in trachea or bronchial tubes  History of tracheomalacia: 2015 - attempted tracheal stenting ([facility])- course complicated by obstruction, respiratory failure, multiple CPR attempts -  stent discontinued; 10/20/2016 Tracheobronchoplasty (Prolene Mesh) performed at [facility] by Dr Zapien  H/O pelvic surgery: 5 years ago - s/p fracture  Exostosis of orbit, 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  Musculoskeletal:	No joint swelling or pain.No weakness  Psychiatric:No delusions or hallucinations	  Endocrine:	No recent weight gain or loss.No abnormal heat/cold intolerance    Allergies  ampicillin (Short breath)  aspirin (Short breath)  Avelox (Short breath; Pruritus)  codeine (Short breath)  Dilaudid (Short breath)  iodine (Short breath; Swelling)  penicillin (Short breath)  shellfish (Anaphylaxis)  tetanus toxoid (Short breath)  Valium (Short breath)    ANTIMICROBIALS:  meropenem  IVPB      meropenem  IVPB 1000 milliGRAM(s) IV Intermittent every 8 hours  oseltamivir 75 milliGRAM(s) Oral two times a day  tobramycin for Nebulization 300 milliGRAM(s) Inhalation every 12 hours  vancomycin  IVPB 1000 milliGRAM(s) IV Intermittent every 12 hours    Vital Signs Last 24 Hrs  T(C): 36.8 (09 Dec 2019 09:01), Max: 37.5 (08 Dec 2019 16:20)  T(F): 98.3 (09 Dec 2019 09:01), Max: 99.5 (08 Dec 2019 16:20)  HR: 68 (09 Dec 2019 09:01) (60 - 82)  BP: 128/69 (09 Dec 2019 09:01) (120/63 - 129/71)  BP(mean): --  RR: 18 (09 Dec 2019 09:01) (16 - 18)  SpO2: 94% (09 Dec 2019 09:01) (91% - 96%)    PHYSICAL EXAM:Pleasant patient in no acute distress.  frail  mild cachexia   Neck:Supple,No LN,no JVD  Respiratory:Good air entry bilaterally  Cardiovascular:S1 S2 wnl, No murmurs,rub or gallops  Gastrointestinal:Soft BS(+) no tenderness no masses ,No rebound or guarding  Extremities:No cyanosis,clubbing or edema.  Skin:No rash   Lymph Nodes:No palpable LNs  Musculoskeletal:No joint swelling or LOM  Psychiatric:Affect normal.                    11.5   6.22  )-----------( 238      ( 09 Dec 2019 10:09 )             38.0   12-09    136  |  97  |  15  ----------------------------<  377<H>  5.0   |  29  |  0.62    Ca    9.0      09 Dec 2019 07:49  Phos  4.1     12-08  Mg     2.0     12-08        RECENT CULTURES:  12-08 @ 00:51  .Sputum Sputum  --Normal Respiratory Jessica present  --  12-07 @ 10:07  .Urine Clean Catch (Midstream)  --  <10,000 CFU/mL Normal Urogenital Jessica  --  12-07 @ 10:01  .Blood Blood-Peripheral  --No growth to date.  --  12-07 @ 06:34  .Blood Blood-Peripheral  --No growth to date.  --RVP + flu A    RADIOLOGY:< from: CT Chest No Cont (12.07.19 @ 09:05) >    No bowel obstruction. Moderate fecal material in the colon.    Subcentimeter nodular opacities in the right middle and lower lobes again   noted. Several new subcentimeter nodules in the right upper and middle   lobes, of uncertain etiology.    Heterogeneous appearance of the vertebral bodies, unchanged.    Cystic lesions in the pancreatic tail, unchanged.    < end of copied text >    IMPRESSION:  The patient has influenza A.  CT chest without clear consolidation and I doubt there is a bacterial pna.  She may have bacterial colonization in view of structural lung disease but doubt new bacterial pna.    Rx:  To diminish risk of c. diff and colonization with resistant pathogens will dc the vanco and meropenem.  complete 5 days of tamiflu for Flu.  inhaled tobra to continue per d/w Dr. Allison.    I d/w Dr. Allison and Dr. Lema.    Thank you.

## 2019-12-09 NOTE — PROGRESS NOTE ADULT - PROBLEM SELECTOR PLAN 4
Patient had primary immunodeficiency workup 2 years ago which was negative, vaccines were redosed at that time with adequate immunologic response  IGG can be rechecked in addition to other tests but per immunology would not do so in setting of infxn - pt should resume follow up upon discharge w/Dr Romero. Steroids remains a confounding detail as well.

## 2019-12-09 NOTE — PROGRESS NOTE ADULT - PROBLEM SELECTOR PLAN 1
Flu A superimproved on tracheobronchomalacia flare / PNA  - patient presented with shortness of breath and borderline fever on steroids with flu A positivity   - duoneb PRN, budesonide nebs, spiriva and acapella, hypersal BID. inhaled tobramycin given recent bronchoscopy in November and proteus cultures. c/w solumedrol 20 q 8hrs,  meropenem/vanco per pulm given prior bronch cx results  ID eval called

## 2019-12-09 NOTE — PROGRESS NOTE ADULT - SUBJECTIVE AND OBJECTIVE BOX
[facility] Division of Hospital Medicine  Dejuan Lema MD  Pager (M-F, 9A-7I): [phone]  Other Times:  [phone]    Patient is a 69y old  Female who presents with a chief complaint of vomiting and shortness of breath (09 Dec 2019 05:57)    SUBJECTIVE / OVERNIGHT EVENTS: stable symptoms. no events  ADDITIONAL REVIEW OF SYSTEMS:    MEDICATIONS  (STANDING):  apixaban 5 milliGRAM(s) Oral every 12 hours  atorvastatin 20 milliGRAM(s) Oral at bedtime  budesonide 160 MICROgram(s)/formoterol 4.5 MICROgram(s) Inhaler 2 Puff(s) Inhalation two times a day  chlorhexidine 4% Liquid 1 Application(s) Topical daily  dextrose 5%. 1000 milliLiter(s) (50 mL/Hr) IV Continuous <Continuous>  dextrose 50% Injectable 12.5 Gram(s) IV Push once  dextrose 50% Injectable 25 Gram(s) IV Push once  dextrose 50% Injectable 25 Gram(s) IV Push once  digoxin     Tablet 0.25 milliGRAM(s) Oral daily  insulin glargine Injectable (LANTUS) 7 Unit(s) SubCutaneous <User Schedule>  insulin lispro (HumaLOG) corrective regimen sliding scale   SubCutaneous three times a day before meals  insulin lispro (HumaLOG) corrective regimen sliding scale   SubCutaneous at bedtime  lactated ringers. 1000 milliLiter(s) (25 mL/Hr) IV Continuous <Continuous>  meropenem  IVPB      meropenem  IVPB 1000 milliGRAM(s) IV Intermittent every 8 hours  methylPREDNISolone sodium succinate Injectable 20 milliGRAM(s) IV Push every 8 hours  montelukast 10 milliGRAM(s) Oral at bedtime  oseltamivir 75 milliGRAM(s) Oral two times a day  pantoprazole    Tablet 40 milliGRAM(s) Oral before breakfast  sodium chloride 0.9% for Nebulization 3 milliLiter(s) Nebulizer two times a day  tiotropium 18 MICROgram(s) Capsule 1 Capsule(s) Inhalation daily  tobramycin for Nebulization 300 milliGRAM(s) Inhalation every 12 hours  vancomycin  IVPB 1000 milliGRAM(s) IV Intermittent every 12 hours    MEDICATIONS  (PRN):  acetaminophen   Tablet .. 650 milliGRAM(s) Oral every 6 hours PRN Temp greater or equal to 38C (100.4F)  albuterol/ipratropium for Nebulization 3 milliLiter(s) Nebulizer every 6 hours PRN Shortness of Breath and/or Wheezing  dextrose 40% Gel 15 Gram(s) Oral once PRN Blood Glucose LESS THAN 70 milliGRAM(s)/deciliter  glucagon  Injectable 1 milliGRAM(s) IntraMuscular once PRN Glucose LESS THAN 70 milligrams/deciliter  ondansetron Injectable 4 milliGRAM(s) IV Push every 8 hours PRN Nausea and/or Vomiting      CAPILLARY BLOOD GLUCOSE      POCT Blood Glucose.: 271 mg/dL (09 Dec 2019 13:52)  POCT Blood Glucose.: 312 mg/dL (09 Dec 2019 09:01)  POCT Blood Glucose.: 307 mg/dL (08 Dec 2019 21:55)  POCT Blood Glucose.: 265 mg/dL (08 Dec 2019 18:26)    I&O's Summary    08 Dec 2019 07:01  -  09 Dec 2019 07:00  --------------------------------------------------------  IN: 1790 mL / OUT: 1000 mL / NET: 790 mL    09 Dec 2019 07:01  -  09 Dec 2019 14:29  --------------------------------------------------------  IN: 500 mL / OUT: 600 mL / NET: -100 mL        PHYSICAL EXAM:  Vital Signs Last 24 Hrs  T(C): 36.8 (09 Dec 2019 09:01), Max: 37.5 (08 Dec 2019 16:20)  T(F): 98.3 (09 Dec 2019 09:01), Max: 99.5 (08 Dec 2019 16:20)  HR: 68 (09 Dec 2019 09:01) (60 - 82)  BP: 128/69 (09 Dec 2019 09:01) (120/63 - 129/71)  BP(mean): --  RR: 18 (09 Dec 2019 09:01) (16 - 18)  SpO2: 94% (09 Dec 2019 09:01) (91% - 96%)  General: elderly female, on NC NAD  HEENT: PERRL, EOMI, Dry MM  Neck: Supple, no JVD  Cardiac: right chest wall port, site dry and intact. RRR, normal s1 and s2, faint systolic murmur   Lungs: poor inspiratory effort, CTAB up until the mid lung with decreased breath sounds on the right middle and right lower lung   Abdomen: soft, nondistended abdomen. Bowel sounds present. non TTP. Ostomy site intact with minimal output from ostomy  Extremities: no cyanosis, pitting or edema. Palpable pulses   Neuro: AAO x 3, nonfocal    LABS:                        11.5   6.22  )-----------( 238      ( 09 Dec 2019 10:09 )             38.0     12-09    136  |  97  |  15  ----------------------------<  377<H>  5.0   |  29  |  0.62    Ca    9.0      09 Dec 2019 07:49  Phos  4.1     12-08  Mg     2.0     12-08      PT/INR - ( 08 Dec 2019 09:08 )   PT: 15.5 sec;   INR: 1.34 ratio         PTT - ( 08 Dec 2019 09:08 )  PTT:30.8 sec          Culture - Sputum (collected 08 Dec 2019 00:51)  Source: .Sputum Sputum  Gram Stain (08 Dec 2019 04:12):    Few polymorphonuclear leukocytes per low power field    Numerous Squamous epithelial cells per low power field    Moderate Yeast like cells seen per oil power field    Few Gram Negative Rods seen per oil power field  Preliminary Report (08 Dec 2019 19:01):    Normal Respiratory Jessica present    Culture - Urine (collected 07 Dec 2019 10:07)  Source: .Urine Clean Catch (Midstream)  Final Report (08 Dec 2019 09:10):    <10,000 CFU/mL Normal Urogenital Jessica    Culture - Blood (collected 07 Dec 2019 10:01)  Source: .Blood Blood-Peripheral  Preliminary Report (08 Dec 2019 11:01):    No growth to date.    Culture - Blood (collected 07 Dec 2019 06:34)  Source: .Blood Blood-Peripheral  Preliminary Report (08 Dec 2019 07:01):    No growth to date.        RADIOLOGY & ADDITIONAL TESTS:  Results Reviewed:   Imaging Personally Reviewed:  Electrocardiogram Personally Reviewed:    COORDINATION OF CARE:  Care Discussed with Consultants/Other Providers [Y/N]: Pulm Dr Allison re need for immunology & ID - consults called  Prior or Outpatient Records Reviewed [Y/N]: outpatient record re prior immunology workup

## 2019-12-09 NOTE — PROGRESS NOTE ADULT - PROBLEM SELECTOR PLAN 8
- patient with hx colorectal cancer s/p chemoXRT and resection now with diverting ileostomy, follows at the [facility]   - as per patient GEETHA

## 2019-12-09 NOTE — PROGRESS NOTE ADULT - ATTENDING COMMENTS
as above-  multifactorial dyspnea-TBM, asthmatic bronchitis, debility--O2 NC sat above 90%  asthma flair--solumedrol 20 q 8, duoneb q 6, spiriva, singulair  TBM-amitriptyline, cpt-acapella/chest vest rx  ID-on meropenem, vanco, inhaled tatum---formal ID evaln  immunodeficiency--formal immunology evaln  DVT/GI evaln  Eulalio Allison MD-Pulmonary   [phone]

## 2019-12-10 LAB
ANION GAP SERPL CALC-SCNC: 12 MMOL/L — SIGNIFICANT CHANGE UP (ref 5–17)
BUN SERPL-MCNC: 19 MG/DL — SIGNIFICANT CHANGE UP (ref 7–23)
CALCIUM SERPL-MCNC: 9 MG/DL — SIGNIFICANT CHANGE UP (ref 8.4–10.5)
CHLORIDE SERPL-SCNC: 98 MMOL/L — SIGNIFICANT CHANGE UP (ref 96–108)
CO2 SERPL-SCNC: 28 MMOL/L — SIGNIFICANT CHANGE UP (ref 22–31)
CREAT SERPL-MCNC: 0.64 MG/DL — SIGNIFICANT CHANGE UP (ref 0.5–1.3)
GLUCOSE BLDC GLUCOMTR-MCNC: 268 MG/DL — HIGH (ref 70–99)
GLUCOSE BLDC GLUCOMTR-MCNC: 268 MG/DL — HIGH (ref 70–99)
GLUCOSE BLDC GLUCOMTR-MCNC: 306 MG/DL — HIGH (ref 70–99)
GLUCOSE BLDC GLUCOMTR-MCNC: 312 MG/DL — HIGH (ref 70–99)
GLUCOSE BLDC GLUCOMTR-MCNC: 327 MG/DL — HIGH (ref 70–99)
GLUCOSE SERPL-MCNC: 347 MG/DL — HIGH (ref 70–99)
HCT VFR BLD CALC: 37.8 % — SIGNIFICANT CHANGE UP (ref 34.5–45)
HGB BLD-MCNC: 11.5 G/DL — SIGNIFICANT CHANGE UP (ref 11.5–15.5)
MCHC RBC-ENTMCNC: 23.2 PG — LOW (ref 27–34)
MCHC RBC-ENTMCNC: 30.4 GM/DL — LOW (ref 32–36)
MCV RBC AUTO: 76.4 FL — LOW (ref 80–100)
PLATELET # BLD AUTO: 243 K/UL — SIGNIFICANT CHANGE UP (ref 150–400)
POTASSIUM SERPL-MCNC: 4.8 MMOL/L — SIGNIFICANT CHANGE UP (ref 3.5–5.3)
POTASSIUM SERPL-SCNC: 4.8 MMOL/L — SIGNIFICANT CHANGE UP (ref 3.5–5.3)
RBC # BLD: 4.95 M/UL — SIGNIFICANT CHANGE UP (ref 3.8–5.2)
RBC # FLD: 16.6 % — HIGH (ref 10.3–14.5)
SODIUM SERPL-SCNC: 138 MMOL/L — SIGNIFICANT CHANGE UP (ref 135–145)
WBC # BLD: 6.4 K/UL — SIGNIFICANT CHANGE UP (ref 3.8–10.5)
WBC # FLD AUTO: 6.4 K/UL — SIGNIFICANT CHANGE UP (ref 3.8–10.5)

## 2019-12-10 PROCEDURE — 99232 SBSQ HOSP IP/OBS MODERATE 35: CPT

## 2019-12-10 PROCEDURE — 99233 SBSQ HOSP IP/OBS HIGH 50: CPT

## 2019-12-10 RX ORDER — POLYETHYLENE GLYCOL 3350 17 G/17G
17 POWDER, FOR SOLUTION ORAL ONCE
Refills: 0 | Status: COMPLETED | OUTPATIENT
Start: 2019-12-10 | End: 2019-12-10

## 2019-12-10 RX ORDER — SENNA PLUS 8.6 MG/1
2 TABLET ORAL ONCE
Refills: 0 | Status: COMPLETED | OUTPATIENT
Start: 2019-12-10 | End: 2019-12-10

## 2019-12-10 RX ORDER — AMITRIPTYLINE HCL 25 MG
10 TABLET ORAL EVERY 8 HOURS
Refills: 0 | Status: DISCONTINUED | OUTPATIENT
Start: 2019-12-10 | End: 2019-12-12

## 2019-12-10 RX ORDER — INSULIN GLARGINE 100 [IU]/ML
12 INJECTION, SOLUTION SUBCUTANEOUS AT BEDTIME
Refills: 0 | Status: DISCONTINUED | OUTPATIENT
Start: 2019-12-10 | End: 2019-12-12

## 2019-12-10 RX ADMIN — SODIUM CHLORIDE 3 MILLILITER(S): 9 INJECTION INTRAMUSCULAR; INTRAVENOUS; SUBCUTANEOUS at 05:50

## 2019-12-10 RX ADMIN — Medication 3: at 17:57

## 2019-12-10 RX ADMIN — ATORVASTATIN CALCIUM 20 MILLIGRAM(S): 80 TABLET, FILM COATED ORAL at 23:34

## 2019-12-10 RX ADMIN — TIOTROPIUM BROMIDE 1 CAPSULE(S): 18 CAPSULE ORAL; RESPIRATORY (INHALATION) at 12:17

## 2019-12-10 RX ADMIN — APIXABAN 5 MILLIGRAM(S): 2.5 TABLET, FILM COATED ORAL at 23:34

## 2019-12-10 RX ADMIN — SODIUM CHLORIDE 3 MILLILITER(S): 9 INJECTION INTRAMUSCULAR; INTRAVENOUS; SUBCUTANEOUS at 17:09

## 2019-12-10 RX ADMIN — Medication 3 MILLILITER(S): at 23:55

## 2019-12-10 RX ADMIN — BUDESONIDE AND FORMOTEROL FUMARATE DIHYDRATE 2 PUFF(S): 160; 4.5 AEROSOL RESPIRATORY (INHALATION) at 17:09

## 2019-12-10 RX ADMIN — SODIUM CHLORIDE 25 MILLILITER(S): 9 INJECTION, SOLUTION INTRAVENOUS at 12:17

## 2019-12-10 RX ADMIN — CHLORHEXIDINE GLUCONATE 1 APPLICATION(S): 213 SOLUTION TOPICAL at 12:18

## 2019-12-10 RX ADMIN — MONTELUKAST 10 MILLIGRAM(S): 4 TABLET, CHEWABLE ORAL at 23:34

## 2019-12-10 RX ADMIN — Medication 20 MILLIGRAM(S): at 23:35

## 2019-12-10 RX ADMIN — Medication 20 MILLIGRAM(S): at 13:00

## 2019-12-10 RX ADMIN — BUDESONIDE AND FORMOTEROL FUMARATE DIHYDRATE 2 PUFF(S): 160; 4.5 AEROSOL RESPIRATORY (INHALATION) at 05:49

## 2019-12-10 RX ADMIN — Medication 0.25 MILLIGRAM(S): at 05:50

## 2019-12-10 RX ADMIN — INSULIN GLARGINE 12 UNIT(S): 100 INJECTION, SOLUTION SUBCUTANEOUS at 23:40

## 2019-12-10 RX ADMIN — Medication 300 MILLIGRAM(S): at 17:42

## 2019-12-10 RX ADMIN — Medication 75 MILLIGRAM(S): at 17:09

## 2019-12-10 RX ADMIN — SENNA PLUS 2 TABLET(S): 8.6 TABLET ORAL at 23:55

## 2019-12-10 RX ADMIN — Medication 20 MILLIGRAM(S): at 05:49

## 2019-12-10 RX ADMIN — APIXABAN 5 MILLIGRAM(S): 2.5 TABLET, FILM COATED ORAL at 09:55

## 2019-12-10 RX ADMIN — Medication 4: at 09:54

## 2019-12-10 RX ADMIN — POLYETHYLENE GLYCOL 3350 17 GRAM(S): 17 POWDER, FOR SOLUTION ORAL at 23:43

## 2019-12-10 RX ADMIN — Medication 75 MILLIGRAM(S): at 05:50

## 2019-12-10 RX ADMIN — PANTOPRAZOLE SODIUM 40 MILLIGRAM(S): 20 TABLET, DELAYED RELEASE ORAL at 05:49

## 2019-12-10 RX ADMIN — Medication 300 MILLIGRAM(S): at 05:50

## 2019-12-10 RX ADMIN — Medication 4: at 12:56

## 2019-12-10 RX ADMIN — Medication 2: at 23:40

## 2019-12-10 NOTE — PROGRESS NOTE ADULT - PROBLEM SELECTOR PLAN 3
- patient on chronic low dose asthma (was down to 4mg methylpred) before recent exacerbation, now on 8 BID --> incerease to solumedrol 20 q 8 hrs   - need to be mindful, if patient becomes hypotensive --> will need to consider hydrocortisone 50 q8 as she already got stress dose in the ED - patient on chronic low dose asthma (was down to 4mg methylpred) before recent exacerbation, now on solumedrol transition to medrol  - need to be mindful, if patient becomes hypotensive --> will need to consider hydrocortisone 50 q8 as she already got stress dose in the ED

## 2019-12-10 NOTE — PROGRESS NOTE ADULT - SUBJECTIVE AND OBJECTIVE BOX
[facility] Division of Hospital Medicine  Dejuan Lema MD  Pager (M-F, 8G-8F): [phone]  Other Times:  [phone]    Patient is a 69y old  Female who presents with a chief complaint of vomiting and shortness of breath (10 Dec 2019 09:05)    SUBJECTIVE / OVERNIGHT EVENTS: still some chest tightness but overall improving  ADDITIONAL REVIEW OF SYSTEMS:    MEDICATIONS  (STANDING):  apixaban 5 milliGRAM(s) Oral every 12 hours  atorvastatin 20 milliGRAM(s) Oral at bedtime  budesonide 160 MICROgram(s)/formoterol 4.5 MICROgram(s) Inhaler 2 Puff(s) Inhalation two times a day  chlorhexidine 4% Liquid 1 Application(s) Topical daily  dextrose 5%. 1000 milliLiter(s) (50 mL/Hr) IV Continuous <Continuous>  dextrose 50% Injectable 12.5 Gram(s) IV Push once  dextrose 50% Injectable 25 Gram(s) IV Push once  dextrose 50% Injectable 25 Gram(s) IV Push once  digoxin     Tablet 0.25 milliGRAM(s) Oral daily  insulin glargine Injectable (LANTUS) 12 Unit(s) SubCutaneous at bedtime  insulin lispro (HumaLOG) corrective regimen sliding scale   SubCutaneous three times a day before meals  insulin lispro (HumaLOG) corrective regimen sliding scale   SubCutaneous at bedtime  lactated ringers. 1000 milliLiter(s) (25 mL/Hr) IV Continuous <Continuous>  methylPREDNISolone sodium succinate Injectable 20 milliGRAM(s) IV Push every 8 hours  montelukast 10 milliGRAM(s) Oral at bedtime  oseltamivir 75 milliGRAM(s) Oral two times a day  pantoprazole    Tablet 40 milliGRAM(s) Oral before breakfast  sodium chloride 0.9% for Nebulization 3 milliLiter(s) Nebulizer two times a day  tiotropium 18 MICROgram(s) Capsule 1 Capsule(s) Inhalation daily  tobramycin for Nebulization 300 milliGRAM(s) Inhalation every 12 hours    MEDICATIONS  (PRN):  acetaminophen   Tablet .. 650 milliGRAM(s) Oral every 6 hours PRN Temp greater or equal to 38C (100.4F)  albuterol/ipratropium for Nebulization 3 milliLiter(s) Nebulizer every 6 hours PRN Shortness of Breath and/or Wheezing  dextrose 40% Gel 15 Gram(s) Oral once PRN Blood Glucose LESS THAN 70 milliGRAM(s)/deciliter  glucagon  Injectable 1 milliGRAM(s) IntraMuscular once PRN Glucose LESS THAN 70 milligrams/deciliter  ondansetron Injectable 4 milliGRAM(s) IV Push every 8 hours PRN Nausea and/or Vomiting      CAPILLARY BLOOD GLUCOSE      POCT Blood Glucose.: 312 mg/dL (10 Dec 2019 12:24)  POCT Blood Glucose.: 306 mg/dL (10 Dec 2019 09:37)  POCT Blood Glucose.: 298 mg/dL (09 Dec 2019 21:45)  POCT Blood Glucose.: 256 mg/dL (09 Dec 2019 18:10)  POCT Blood Glucose.: 271 mg/dL (09 Dec 2019 13:52)    I&O's Summary    09 Dec 2019 07:01  -  10 Dec 2019 07:00  --------------------------------------------------------  IN: 1000 mL / OUT: 600 mL / NET: 400 mL    10 Dec 2019 07:01  -  10 Dec 2019 13:19  --------------------------------------------------------  IN: 400 mL / OUT: 0 mL / NET: 400 mL        PHYSICAL EXAM:  Vital Signs Last 24 Hrs  T(C): 36.8 (10 Dec 2019 09:41), Max: 36.9 (09 Dec 2019 16:27)  T(F): 98.2 (10 Dec 2019 09:41), Max: 98.4 (09 Dec 2019 16:27)  HR: 56 (10 Dec 2019 09:41) (53 - 81)  BP: 160/79 (10 Dec 2019 09:41) (129/69 - 160/79)  BP(mean): --  RR: 18 (10 Dec 2019 09:41) (18 - 18)  SpO2: 96% (10 Dec 2019 09:41) (92% - 96%)  General: elderly female, on NC NAD  HEENT: PERRL, EOMI, Dry MM  Neck: Supple, no JVD  Cardiac: right chest wall port, site dry and intact. RRR, normal s1 and s2, faint systolic murmur   Lungs: improved inspiratory effort, CTAB up until the mid lung with decreased breath sounds on the right middle and right lower lung   Abdomen: soft, nondistended abdomen. Bowel sounds present. non TTP. Ostomy site intact with minimal output from ostomy  Extremities: no cyanosis, pitting or edema. Palpable pulses   Neuro: AAO x 3, nonfocal    LABS:                        11.5   6.40  )-----------( 243      ( 10 Dec 2019 09:17 )             37.8     12-10    138  |  98  |  19  ----------------------------<  347<H>  4.8   |  28  |  0.64    Ca    9.0      10 Dec 2019 07:44                Culture - Sputum (collected 08 Dec 2019 00:51)  Source: .Sputum Sputum  Gram Stain (08 Dec 2019 04:12):    Few polymorphonuclear leukocytes per low power field    Numerous Squamous epithelial cells per low power field    Moderate Yeast like cells seen per oil power field    Few Gram Negative Rods seen per oil power field  Final Report (09 Dec 2019 16:50):    Normal Respiratory Jessica present        RADIOLOGY & ADDITIONAL TESTS:  Results Reviewed:   Imaging Personally Reviewed:  Electrocardiogram Personally Reviewed:    COORDINATION OF CARE: pulkaty brambila recs appreciated  Care Discussed with Consultants/Other Providers [Y/N]:  Prior or Outpatient Records Reviewed [Y/N]:

## 2019-12-10 NOTE — PROGRESS NOTE ADULT - PROBLEM SELECTOR PLAN 7
- on lantus 10 QAM and novolog SSI,   - monitor for steroid induced hyperglycemia w/hyperglycemia, on long term insulin  - increase insulin to account for 10 units of coverage - Lantus 12  - consider premeal if still elevated  - monitor for steroid induced hyperglycemia

## 2019-12-10 NOTE — PROGRESS NOTE ADULT - PROBLEM SELECTOR PLAN 8
- patient with hx colorectal cancer s/p chemoXRT and resection now with diverting ileostomy, follows at the [facility]   - as per patient GEETHA

## 2019-12-10 NOTE — PROGRESS NOTE ADULT - ASSESSMENT
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

## 2019-12-10 NOTE — PROGRESS NOTE ADULT - SUBJECTIVE AND OBJECTIVE BOX
CHIEF COMPLAINT: f/up sob, influenza, asthma, TBM, allergy--better over all--less cough and less productive cough-tight chest    Interval Events: ID evaln    REVIEW OF SYSTEMS:  Constitutional: No fevers or chills. No weight loss. + fatigue or generalized malaise.  Eyes: No itching or discharge from the eyes  ENT: No ear pain. No ear discharge. No nasal congestion. No post nasal drip. No epistaxis. No throat pain. No sore throat. No difficulty swallowing.   CV: No chest pain. No palpitations. No lightheadedness or dizziness.   Resp: No dyspnea at rest. + dyspnea on exertion. No orthopnea. + wheezing. No cough. No stridor. No sputum production. No chest pain with respiration.  GI: No nausea. No vomiting. No diarrhea.  MSK: No joint pain or pain in any extremities  Integumentary: No skin lesions. No pedal edema.  Neurological: No gross motor weakness. No sensory changes.  [+ ] All other systems negative  [ ] Unable to assess ROS because ________    OBJECTIVE:  ICU Vital Signs Last 24 Hrs  T(C): 36.7 (10 Dec 2019 04:18), Max: 36.9 (09 Dec 2019 16:27)  T(F): 98.1 (10 Dec 2019 04:18), Max: 98.4 (09 Dec 2019 16:27)  HR: 57 (10 Dec 2019 04:18) (53 - 81)  BP: 129/69 (10 Dec 2019 04:18) (125/70 - 137/71)  BP(mean): --  ABP: --  ABP(mean): --  RR: 18 (10 Dec 2019 04:18) (18 - 18)  SpO2: 94% (10 Dec 2019 04:18) (92% - 95%)        12-08 @ 07:01  -  12-09 @ 07:00  --------------------------------------------------------  IN: 1790 mL / OUT: 1000 mL / NET: 790 mL    12-09 @ 07:01  -  12-10 @ 05:38  --------------------------------------------------------  IN: 700 mL / OUT: 600 mL / NET: 100 mL      CAPILLARY BLOOD GLUCOSE      POCT Blood Glucose.: 298 mg/dL (09 Dec 2019 21:45)      PHYSICAL EXAM: NAD in bed   General: Awake, alert, oriented X 3.   HEENT: Atraumatic, normocephalic.                 Mallampatti Grade 2                No nasal congestion.                No tonsillar or pharyngeal exudates.  Lymph Nodes: No palpable lymphadenopathy  Neck: No JVD. No carotid bruit.   Respiratory: Normal chest expansion                         Normal percussion                         Normal and equal air entry                         + exp wheeze, no rhonchi or rales.  Cardiovascular: S1 S2 normal. No murmurs, rubs or gallops.   Abdomen: Soft, non-tender, non-distended. No organomegaly. Normoactive bowel sounds.  Extremities: Warm to touch. Peripheral pulse palpable. No pedal edema.   Skin: No rashes or skin lesions  Neurological: Motor and sensory examination equal and normal in all four extremities.  Psychiatry: Appropriate mood and affect.    HOSPITAL MEDICATIONS:  MEDICATIONS  (STANDING):  apixaban 5 milliGRAM(s) Oral every 12 hours  atorvastatin 20 milliGRAM(s) Oral at bedtime  budesonide 160 MICROgram(s)/formoterol 4.5 MICROgram(s) Inhaler 2 Puff(s) Inhalation two times a day  chlorhexidine 4% Liquid 1 Application(s) Topical daily  dextrose 5%. 1000 milliLiter(s) (50 mL/Hr) IV Continuous <Continuous>  dextrose 50% Injectable 12.5 Gram(s) IV Push once  dextrose 50% Injectable 25 Gram(s) IV Push once  dextrose 50% Injectable 25 Gram(s) IV Push once  digoxin     Tablet 0.25 milliGRAM(s) Oral daily  insulin glargine Injectable (LANTUS) 7 Unit(s) SubCutaneous <User Schedule>  insulin lispro (HumaLOG) corrective regimen sliding scale   SubCutaneous three times a day before meals  insulin lispro (HumaLOG) corrective regimen sliding scale   SubCutaneous at bedtime  lactated ringers. 1000 milliLiter(s) (25 mL/Hr) IV Continuous <Continuous>  methylPREDNISolone sodium succinate Injectable 20 milliGRAM(s) IV Push every 8 hours  montelukast 10 milliGRAM(s) Oral at bedtime  oseltamivir 75 milliGRAM(s) Oral two times a day  pantoprazole    Tablet 40 milliGRAM(s) Oral before breakfast  sodium chloride 0.9% for Nebulization 3 milliLiter(s) Nebulizer two times a day  tiotropium 18 MICROgram(s) Capsule 1 Capsule(s) Inhalation daily  tobramycin for Nebulization 300 milliGRAM(s) Inhalation every 12 hours    MEDICATIONS  (PRN):  acetaminophen   Tablet .. 650 milliGRAM(s) Oral every 6 hours PRN Temp greater or equal to 38C (100.4F)  albuterol/ipratropium for Nebulization 3 milliLiter(s) Nebulizer every 6 hours PRN Shortness of Breath and/or Wheezing  dextrose 40% Gel 15 Gram(s) Oral once PRN Blood Glucose LESS THAN 70 milliGRAM(s)/deciliter  glucagon  Injectable 1 milliGRAM(s) IntraMuscular once PRN Glucose LESS THAN 70 milligrams/deciliter  ondansetron Injectable 4 milliGRAM(s) IV Push every 8 hours PRN Nausea and/or Vomiting      LABS:                        11.5   6.22  )-----------( 238      ( 09 Dec 2019 10:09 )             38.0     12-09    136  |  97  |  15  ----------------------------<  377<H>  5.0   |  29  |  0.62    Ca    9.0      09 Dec 2019 07:49  Phos  4.1     12-08  Mg     2.0     12-08      PT/INR - ( 08 Dec 2019 09:08 )   PT: 15.5 sec;   INR: 1.34 ratio         PTT - ( 08 Dec 2019 09:08 )  PTT:30.8 sec          MICROBIOLOGY:     RADIOLOGY:  [ ] Reviewed and interpreted by me    Point of Care Ultrasound Findings:    PFT:    EKG:

## 2019-12-10 NOTE — PROGRESS NOTE ADULT - PROBLEM SELECTOR PLAN 5
- as above, s/p trachebronchoplasty    - currently on RA, without any issues  - abx as above - as above, s/p trachebronchoplasty    - currently on RA, without any issues  - abx as above  amitriptyline and airway clearance per pulm

## 2019-12-10 NOTE — PROGRESS NOTE ADULT - SUBJECTIVE AND OBJECTIVE BOX
INFECTIOUS DISEASES FOLLOW UP--Sergio Lai MD  Pager [phone]    This is a follow up note for this  69y Female with  influenza A.  Feeling better.  No acute problems overnight.  Tolerating tamiflu    Further ROS:  CONSTITUTIONAL:  No fever, good appetite  CARDIOVASCULAR:  No chest pain or palpitations  RESPIRATORY:  No dyspnea at rest  GASTROINTESTINAL:  No nausea, vomiting, diarrhea, or abdominal pain  GENITOURINARY:  No dysuria  NEUROLOGIC:  No headache,     Allergies    ampicillin (Short breath)  aspirin (Short breath)  Avelox (Short breath; Pruritus)  codeine (Short breath)  Dilaudid (Short breath)  iodine (Short breath; Swelling)  penicillin (Short breath)  shellfish (Anaphylaxis)  tetanus toxoid (Short breath)  Valium (Short breath)    ANTIBIOTICS/RELEVANT:  antimicrobials  oseltamivir 75 milliGRAM(s) Oral two times a day  tobramycin for Nebulization 300 milliGRAM(s) Inhalation every 12 hours    OTHER:  acetaminophen   Tablet .. 650 milliGRAM(s) Oral every 6 hours PRN  albuterol/ipratropium for Nebulization 3 milliLiter(s) Nebulizer every 6 hours PRN  apixaban 5 milliGRAM(s) Oral every 12 hours  atorvastatin 20 milliGRAM(s) Oral at bedtime  budesonide 160 MICROgram(s)/formoterol 4.5 MICROgram(s) Inhaler 2 Puff(s) Inhalation two times a day  chlorhexidine 4% Liquid 1 Application(s) Topical daily  dextrose 40% Gel 15 Gram(s) Oral once PRN  dextrose 5%. 1000 milliLiter(s) IV Continuous <Continuous>  dextrose 50% Injectable 12.5 Gram(s) IV Push once  dextrose 50% Injectable 25 Gram(s) IV Push once  dextrose 50% Injectable 25 Gram(s) IV Push once  digoxin     Tablet 0.25 milliGRAM(s) Oral daily  glucagon  Injectable 1 milliGRAM(s) IntraMuscular once PRN  insulin glargine Injectable (LANTUS) 7 Unit(s) SubCutaneous <User Schedule>  insulin lispro (HumaLOG) corrective regimen sliding scale   SubCutaneous three times a day before meals  insulin lispro (HumaLOG) corrective regimen sliding scale   SubCutaneous at bedtime  lactated ringers. 1000 milliLiter(s) IV Continuous <Continuous>  methylPREDNISolone sodium succinate Injectable 20 milliGRAM(s) IV Push every 8 hours  montelukast 10 milliGRAM(s) Oral at bedtime  ondansetron Injectable 4 milliGRAM(s) IV Push every 8 hours PRN  pantoprazole    Tablet 40 milliGRAM(s) Oral before breakfast  sodium chloride 0.9% for Nebulization 3 milliLiter(s) Nebulizer two times a day  tiotropium 18 MICROgram(s) Capsule 1 Capsule(s) Inhalation daily    Objective:  Vital Signs Last 24 Hrs  T(C): 36.7 (10 Dec 2019 04:18), Max: 36.9 (09 Dec 2019 16:27)  T(F): 98.1 (10 Dec 2019 04:18), Max: 98.4 (09 Dec 2019 16:27)  HR: 57 (10 Dec 2019 04:18) (53 - 81)  BP: 129/69 (10 Dec 2019 04:18) (125/70 - 137/71)  BP(mean): --  RR: 18 (10 Dec 2019 04:18) (18 - 18)  SpO2: 94% (10 Dec 2019 04:18) (92% - 95%)    PHYSICAL EXAM: frail  Constitutional:no acute distress  Ear/Nose/Throat: no oral lesions, 	  Respiratory: clear BL ant  Cardiovascular: S1S2  Gastrointestinal:soft, (+) BS, no tenderness  Extremities:no e/e/c  No Lymphadenopathy  IV sites not inflammed.    LABS:                        11.5   6.22  )-----------( 238      ( 09 Dec 2019 10:09 )             38.0     12-10    138  |  98  |  19  ----------------------------<  347<H>  4.8   |  28  |  0.64    Ca    9.0      10 Dec 2019 07:44      PT/INR - ( 08 Dec 2019 09:08 )   PT: 15.5 sec;   INR: 1.34 ratio    PTT - ( 08 Dec 2019 09:08 )  PTT:30.8 sec    MICROBIOLOGY: no new micro    imp/rx:  Influenza A, with pre-existing pulm disease.  Stable on tamiflu and inhaled tobramycin.  plan to complete 5 days of tamiflu.  Patient appears stable off of systemic abx.    to see as requested.  thank you.

## 2019-12-10 NOTE — PROGRESS NOTE ADULT - ATTENDING COMMENTS
as above-better over all--off IV abx  multifactorial dyspnea-TBM, asthmatic bronchitis, debility--O2 NC sat above 90%  asthma flair--solumedrol 20 q 8, duoneb q 6, spiriva, singulair                        ****tomorrow change solumedrol to po medrol 32 q day  TBM-amitriptyline, cpt-acapella/chest vest rx  ID-off meropenem, vanco but continue, inhaled tatum---s/p formal ID evaln  immunodeficiency--formal immunology evaln  DVT/GI evaln                                   DC planning 48-72 hrs  Eulalio Allison MD-Pulmonary   [phone]

## 2019-12-10 NOTE — PROGRESS NOTE ADULT - PROBLEM SELECTOR PLAN 1
Flu A superimproved on tracheobronchomalacia flare / PNA  - patient presented with shortness of breath and borderline fever on steroids with flu A positivity   - duoneb PRN, budesonide nebs, spiriva and acapella, hypersal BID. inhaled tobramycin given recent bronchoscopy in November and proteus cultures. c/w solumedrol 20 q 8hrs,  ID/pulm appreciated  monitor off abx  c/w tobra and steroids Flu A superimproved on tracheobronchomalacia flare / PNA  - patient presented with shortness of breath and borderline fever on steroids with flu A positivity   - duoneb PRN, budesonide nebs, spiriva and acapella, hypersal BID. inhaled tobramycin given recent bronchoscopy in November and proteus cultures. c/w solumedrol 20 q 8hrs, transition to medrol 32mg daily tomorrow  ID/pulm appreciated  monitor off abx  c/w tobra and steroids

## 2019-12-10 NOTE — PROGRESS NOTE ADULT - ASSESSMENT
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 She reports daily cough, productive of yellow to green sputum, no hemoptysis, associated with mild chest tightness. Recently had a bronchoscopy with Dr. Mcclellan at [facility] - sputum cultures grew pseudomonas, proteus and acinetobacter     Last admitted 8/2019 for asthma exacerbation 2/2 entero/rhinovirus respiratory infection and prior to that 2/2019 for coronavirus resp infection. CT chest: new multiple small nodules, RUL and RML   A/P:   Severe allergic asthma, recently started on Dupixent, Chronic steroid use c/w influenza A  **********************  12/9- better over all  12/10-ID evaln-no need for IV abx

## 2019-12-11 ENCOUNTER — TRANSCRIPTION ENCOUNTER (OUTPATIENT)
Age: 69
End: 2019-12-11

## 2019-12-11 LAB
GLUCOSE BLDC GLUCOMTR-MCNC: 243 MG/DL — HIGH (ref 70–99)
GLUCOSE BLDC GLUCOMTR-MCNC: 270 MG/DL — HIGH (ref 70–99)
GLUCOSE BLDC GLUCOMTR-MCNC: 289 MG/DL — HIGH (ref 70–99)
GLUCOSE BLDC GLUCOMTR-MCNC: 351 MG/DL — HIGH (ref 70–99)

## 2019-12-11 PROCEDURE — 99233 SBSQ HOSP IP/OBS HIGH 50: CPT

## 2019-12-11 PROCEDURE — 99232 SBSQ HOSP IP/OBS MODERATE 35: CPT

## 2019-12-11 RX ORDER — POLYETHYLENE GLYCOL 3350 17 G/17G
17 POWDER, FOR SOLUTION ORAL ONCE
Refills: 0 | Status: COMPLETED | OUTPATIENT
Start: 2019-12-11 | End: 2019-12-11

## 2019-12-11 RX ORDER — INSULIN LISPRO 100/ML
5 VIAL (ML) SUBCUTANEOUS
Refills: 0 | Status: DISCONTINUED | OUTPATIENT
Start: 2019-12-11 | End: 2019-12-12

## 2019-12-11 RX ORDER — SENNA PLUS 8.6 MG/1
2 TABLET ORAL ONCE
Refills: 0 | Status: COMPLETED | OUTPATIENT
Start: 2019-12-11 | End: 2019-12-11

## 2019-12-11 RX ORDER — SENNA PLUS 8.6 MG/1
2 TABLET ORAL AT BEDTIME
Refills: 0 | Status: DISCONTINUED | OUTPATIENT
Start: 2019-12-11 | End: 2019-12-12

## 2019-12-11 RX ORDER — BENZOCAINE AND MENTHOL 5; 1 G/100ML; G/100ML
1 LIQUID ORAL ONCE
Refills: 0 | Status: COMPLETED | OUTPATIENT
Start: 2019-12-11 | End: 2019-12-11

## 2019-12-11 RX ADMIN — Medication 5 UNIT(S): at 17:22

## 2019-12-11 RX ADMIN — Medication 0.25 MILLIGRAM(S): at 06:00

## 2019-12-11 RX ADMIN — BUDESONIDE AND FORMOTEROL FUMARATE DIHYDRATE 2 PUFF(S): 160; 4.5 AEROSOL RESPIRATORY (INHALATION) at 06:00

## 2019-12-11 RX ADMIN — SODIUM CHLORIDE 3 MILLILITER(S): 9 INJECTION INTRAMUSCULAR; INTRAVENOUS; SUBCUTANEOUS at 06:06

## 2019-12-11 RX ADMIN — ATORVASTATIN CALCIUM 20 MILLIGRAM(S): 80 TABLET, FILM COATED ORAL at 21:12

## 2019-12-11 RX ADMIN — Medication 75 MILLIGRAM(S): at 17:21

## 2019-12-11 RX ADMIN — SODIUM CHLORIDE 25 MILLILITER(S): 9 INJECTION, SOLUTION INTRAVENOUS at 09:03

## 2019-12-11 RX ADMIN — Medication 3: at 17:22

## 2019-12-11 RX ADMIN — Medication 650 MILLIGRAM(S): at 05:59

## 2019-12-11 RX ADMIN — PANTOPRAZOLE SODIUM 40 MILLIGRAM(S): 20 TABLET, DELAYED RELEASE ORAL at 06:00

## 2019-12-11 RX ADMIN — Medication 650 MILLIGRAM(S): at 22:11

## 2019-12-11 RX ADMIN — Medication 3: at 14:09

## 2019-12-11 RX ADMIN — Medication 300 MILLIGRAM(S): at 06:06

## 2019-12-11 RX ADMIN — INSULIN GLARGINE 12 UNIT(S): 100 INJECTION, SOLUTION SUBCUTANEOUS at 21:15

## 2019-12-11 RX ADMIN — MONTELUKAST 10 MILLIGRAM(S): 4 TABLET, CHEWABLE ORAL at 22:32

## 2019-12-11 RX ADMIN — Medication 32 MILLIGRAM(S): at 05:59

## 2019-12-11 RX ADMIN — Medication 3 MILLILITER(S): at 21:11

## 2019-12-11 RX ADMIN — Medication 75 MILLIGRAM(S): at 06:00

## 2019-12-11 RX ADMIN — Medication 650 MILLIGRAM(S): at 21:11

## 2019-12-11 RX ADMIN — SODIUM CHLORIDE 3 MILLILITER(S): 9 INJECTION INTRAMUSCULAR; INTRAVENOUS; SUBCUTANEOUS at 17:21

## 2019-12-11 RX ADMIN — TIOTROPIUM BROMIDE 1 CAPSULE(S): 18 CAPSULE ORAL; RESPIRATORY (INHALATION) at 14:07

## 2019-12-11 RX ADMIN — SENNA PLUS 2 TABLET(S): 8.6 TABLET ORAL at 21:10

## 2019-12-11 RX ADMIN — Medication 5: at 09:03

## 2019-12-11 RX ADMIN — APIXABAN 5 MILLIGRAM(S): 2.5 TABLET, FILM COATED ORAL at 21:12

## 2019-12-11 RX ADMIN — BUDESONIDE AND FORMOTEROL FUMARATE DIHYDRATE 2 PUFF(S): 160; 4.5 AEROSOL RESPIRATORY (INHALATION) at 17:21

## 2019-12-11 RX ADMIN — BENZOCAINE AND MENTHOL 1 LOZENGE: 5; 1 LIQUID ORAL at 16:44

## 2019-12-11 RX ADMIN — Medication 300 MILLIGRAM(S): at 17:41

## 2019-12-11 RX ADMIN — SENNA PLUS 2 TABLET(S): 8.6 TABLET ORAL at 08:00

## 2019-12-11 RX ADMIN — POLYETHYLENE GLYCOL 3350 17 GRAM(S): 17 POWDER, FOR SOLUTION ORAL at 21:10

## 2019-12-11 RX ADMIN — SODIUM CHLORIDE 25 MILLILITER(S): 9 INJECTION, SOLUTION INTRAVENOUS at 06:01

## 2019-12-11 RX ADMIN — APIXABAN 5 MILLIGRAM(S): 2.5 TABLET, FILM COATED ORAL at 09:02

## 2019-12-11 RX ADMIN — POLYETHYLENE GLYCOL 3350 17 GRAM(S): 17 POWDER, FOR SOLUTION ORAL at 08:00

## 2019-12-11 RX ADMIN — Medication 5 UNIT(S): at 14:08

## 2019-12-11 NOTE — DIETITIAN INITIAL EVALUATION ADULT. - REASON INDICATOR FOR ASSESSMENT
Pt seen for nutrition consult, as per RN, pt c A1C>8%  Source: pt, RN    Pt admitted c acute onset of vomiting, productive cough, pt found to have Influenza A, extensive past medical history noted.

## 2019-12-11 NOTE — DIETITIAN INITIAL EVALUATION ADULT. - PROBLEM SELECTOR PLAN 1
- patient presented with shortness of breath and borderline fever on steroids with flu A positivity   - duoneb PRN, budesonide nebs, spiriva and acapella, hypersal BID. Will continue with ceftin and inhaled tobramycin given recent bronchoscopy in Novemeber and proteus cultures. Will start solumedrol 20 q 8hrs, was on methylpred 8 mg BID at home

## 2019-12-11 NOTE — DIETITIAN INITIAL EVALUATION ADULT. - OTHER INFO
Pt reports good appetite and intake at home. Usual diet recall obtained, reveals pt consumes similar amounts of CHO at all 3 meals, does consume a variety of fruits, vegetables, starches and proteins. Reports she watches her overall CHO intake and will have a dessert at times. Reports her Finger sticks at home in the morning before eating are in the 90s to 100s and during the day may be slightly higher. Reports she has been on steroids for the last few months which has contributed to an increase in her A1C since the last time she went to her Endocrinologist it was actually around 7.5%. Noted A1C around 8% back in February 2019 as per previous RD note.    Pt reports NKFA. States no micronutrient coverage at home.     Pt reports usual stable wt of about 137-140 pounds. Pt reports good appetite and intake at home. Usual diet recall obtained, reveals pt consumes similar amounts of CHO at all 3 meals, does consume a variety of fruits, vegetables, starches and proteins. Reports she watches her overall CHO intake and will have a dessert at times. Reports her Finger sticks at home in the morning before eating are in the 90s to 100s and during the day may be slightly higher. Reports she has been on steroids for the last few months which has contributed to an increase in her A1C since the last time she went to her Endocrinologist it was actually around 7.5%. Noted A1C around 8% back in February 2019 as per previous RD note. Pt reports she does take Glucerna shakes at home, at times once daily depending on how she feels, takes it to ensure she is eating adequately and maintaining wt ever since she had cancer. Reports in house she has been taking Glucerna about 2 times daily.     Pt agreeable to written material regarding T2DM, does seem to have overall good understanding of diet.     Pt reports NKFA. States no micronutrient coverage at home.     Pt reports usual stable wt of about 137-140 pounds. Pt reports good appetite and intake at home. Usual diet recall obtained, reveals pt consumes similar amounts of CHO at all 3 meals, does consume a variety of fruits, vegetables, starches and proteins. Reports she watches her overall CHO intake and will have a dessert at times. Reports her Finger sticks at home in the morning before eating are in the 90s to 100s and during the day may be slightly higher. Reports she has been on steroids for the last few months which has contributed to an increase in her A1C since the last time she went to her Endocrinologist it was actually around 7.5%. Noted A1C around 8% back in February 2019 as per previous RD note. Pt reports she does take Glucerna shakes at home, at times once daily depending on how she feels, takes it to ensure she is eating adequately and maintaining wt ever since she had cancer. Reports in house she has been taking Glucerna about 2 times daily.     Pt agreeable to written material regarding T2DM, does seem to have overall good understanding of diet.     Pt confirms allergy to shellfish. States no micronutrient coverage at home.     Pt reports usual stable wt of about 137-140 pounds.

## 2019-12-11 NOTE — PROGRESS NOTE ADULT - ATTENDING COMMENTS
as above-better over all--off IV abx--would reduce steroids to solumedrol 20 q 12 or PO medrol 16 bid  multifactorial dyspnea-TBM, asthmatic bronchitis, debility--O2 NC sat above 90%  asthma flair--reduce solumedrol 20 q 8 (as above), duoneb q 6, spiriva, singulair             ****today would change solumedrol to po medrol 32 q day  TBM-amitriptyline, cpt-acapella/chest vest rx  ID-off meropenem, vanco but continue, inhaled tatum---s/p formal ID evaln  immunodeficiency--formal immunology evaln  DVT/GI evaln                                   DC planning 24-48 hrs  Eulalio Allison MD-Pulmonary   [phone]

## 2019-12-11 NOTE — PROGRESS NOTE ADULT - PROBLEM SELECTOR PLAN 8
- patient with hx colorectal cancer s/p chemoXRT and resection now with diverting ileostomy, follows at the [facility]   - as per patient GEETHA    ostomy care - notes 'constipation' - requests bowel regimen and eval by ostomy nurse

## 2019-12-11 NOTE — PROGRESS NOTE ADULT - PROBLEM SELECTOR PLAN 2
- patient with persistent severe asthma  - as above, nebulizers and steroids  - patient on IL4 MAB q 2 weeks (Dupixent) --> due again in roughly 1 week

## 2019-12-11 NOTE — DIETITIAN INITIAL EVALUATION ADULT. - ADD RECOMMEND
1. Continue w/ Glucerna shakes. 2. Encouraged continued good PO intake and choosing Prot c all meals and snacks. Encouraged pt to continue to monitor Finger sticks at home. RD provided verbal and written material regarding T2DM Nutrition Therapy.

## 2019-12-11 NOTE — DISCHARGE NOTE PROVIDER - CARE PROVIDER_API CALL
Eulalio Allison (MD)  Internal Medicine; Pulmonary Disease  [address], [address]  [place] [address]  Phone: [phone]  Fax: [phone]  Follow Up Time: Eulalio Allison (MD)  Internal Medicine; Pulmonary Disease  [address]  [place] [address]  Phone: [phone]  Fax: [phone]  Follow Up Time:     Garrett Romero; PhD; MPH)  Allergy and Immunology; Internal Medicine  [address]  [place] [address]  Phone: [phone]  Fax: [phone]  Follow Up Time:

## 2019-12-11 NOTE — PROGRESS NOTE ADULT - PROBLEM SELECTOR PLAN 5
- as above, s/p trachebronchoplasty    - currently on RA, without any issues  amitriptyline and airway clearance per pulm

## 2019-12-11 NOTE — DISCHARGE NOTE PROVIDER - CARE PROVIDERS DIRECT ADDRESSES
[email or web address]

## 2019-12-11 NOTE — DIETITIAN INITIAL EVALUATION ADULT. - PHYSICAL APPEARANCE
other (specify)/well nourished No overt muscle/fat depletion noted at this time.   Skin: intact  Edema: none at this time    ht: 67.5", wt: 136pounds, BMI: 21 kg/m2, IBW: 138pounds +/- 10%

## 2019-12-11 NOTE — PROGRESS NOTE ADULT - SUBJECTIVE AND OBJECTIVE BOX
[facility] Division of Hospital Medicine  Dejuan Lema MD  Pager (M-F, 0T-[phone]): [phone]  Other Times:  [phone]    Patient is a 69y old  Female who presents with a chief complaint of vomiting and shortness of breath (11 Dec 2019 05:29)      SUBJECTIVE / OVERNIGHT EVENTS: clinically improving, chest tightness resolving, feels well. pt notes no output of ostomy though there is gas, no ab pain  ADDITIONAL REVIEW OF SYSTEMS:    MEDICATIONS  (STANDING):  amitriptyline 10 milliGRAM(s) Oral every 8 hours  apixaban 5 milliGRAM(s) Oral every 12 hours  atorvastatin 20 milliGRAM(s) Oral at bedtime  budesonide 160 MICROgram(s)/formoterol 4.5 MICROgram(s) Inhaler 2 Puff(s) Inhalation two times a day  chlorhexidine 4% Liquid 1 Application(s) Topical daily  dextrose 5%. 1000 milliLiter(s) (50 mL/Hr) IV Continuous <Continuous>  dextrose 50% Injectable 12.5 Gram(s) IV Push once  dextrose 50% Injectable 25 Gram(s) IV Push once  dextrose 50% Injectable 25 Gram(s) IV Push once  digoxin     Tablet 0.25 milliGRAM(s) Oral daily  insulin glargine Injectable (LANTUS) 12 Unit(s) SubCutaneous at bedtime  insulin lispro (HumaLOG) corrective regimen sliding scale   SubCutaneous three times a day before meals  insulin lispro (HumaLOG) corrective regimen sliding scale   SubCutaneous at bedtime  insulin lispro Injectable (HumaLOG) 5 Unit(s) SubCutaneous three times a day before meals  lactated ringers. 1000 milliLiter(s) (25 mL/Hr) IV Continuous <Continuous>  methylPREDNISolone 32 milliGRAM(s) Oral daily  montelukast 10 milliGRAM(s) Oral at bedtime  oseltamivir 75 milliGRAM(s) Oral two times a day  pantoprazole    Tablet 40 milliGRAM(s) Oral before breakfast  sodium chloride 0.9% for Nebulization 3 milliLiter(s) Nebulizer two times a day  tiotropium 18 MICROgram(s) Capsule 1 Capsule(s) Inhalation daily  tobramycin for Nebulization 300 milliGRAM(s) Inhalation every 12 hours    MEDICATIONS  (PRN):  acetaminophen   Tablet .. 650 milliGRAM(s) Oral every 6 hours PRN Temp greater or equal to 38C (100.4F)  albuterol/ipratropium for Nebulization 3 milliLiter(s) Nebulizer every 6 hours PRN Shortness of Breath and/or Wheezing  dextrose 40% Gel 15 Gram(s) Oral once PRN Blood Glucose LESS THAN 70 milliGRAM(s)/deciliter  glucagon  Injectable 1 milliGRAM(s) IntraMuscular once PRN Glucose LESS THAN 70 milligrams/deciliter  ondansetron Injectable 4 milliGRAM(s) IV Push every 8 hours PRN Nausea and/or Vomiting      CAPILLARY BLOOD GLUCOSE      POCT Blood Glucose.: 351 mg/dL (11 Dec 2019 08:42)  POCT Blood Glucose.: 327 mg/dL (10 Dec 2019 23:31)  POCT Blood Glucose.: 268 mg/dL (10 Dec 2019 21:24)  POCT Blood Glucose.: 268 mg/dL (10 Dec 2019 17:50)  POCT Blood Glucose.: 312 mg/dL (10 Dec 2019 12:24)    I&O's Summary    10 Dec 2019 07:01  -  11 Dec 2019 07:00  --------------------------------------------------------  IN: 1142 mL / OUT: 0 mL / NET: 1142 mL        PHYSICAL EXAM:  Vital Signs Last 24 Hrs  T(C): 36.6 (11 Dec 2019 08:46), Max: 37 (10 Dec 2019 16:27)  T(F): 97.9 (11 Dec 2019 08:46), Max: 98.6 (10 Dec 2019 16:27)  HR: 55 (11 Dec 2019 08:46) (52 - 63)  BP: 154/50 (11 Dec 2019 08:46) (147/63 - 169/77)  BP(mean): --  RR: 18 (11 Dec 2019 08:46) (18 - 18)  SpO2: 95% (11 Dec 2019 08:46) (95% - 100%)  General: elderly female, on NC NAD  HEENT: PERRL, EOMI, Dry MM  Neck: Supple, no JVD  Cardiac: right chest wall port, site dry and intact. RRR, normal s1 and s2, faint systolic murmur   Lungs: improved inspiratory effort, occ rhonchi otherwise clear  Abdomen: soft, nondistended abdomen. Bowel sounds present. non TTP. Ostomy site intact with no output from ostomy, only gas  Extremities: no cyanosis, pitting or edema. Palpable pulses   Neuro: AAO x 3, nonfocal    LABS:                        11.5   6.40  )-----------( 243      ( 10 Dec 2019 09:17 )             37.8     12-10    138  |  98  |  19  ----------------------------<  347<H>  4.8   |  28  |  0.64    Ca    9.0      10 Dec 2019 07:44                  RADIOLOGY & ADDITIONAL TESTS:  Results Reviewed:   Imaging Personally Reviewed:  Electrocardiogram Personally Reviewed:    COORDINATION OF CARE: pulm recs appreciated  Care Discussed with Consultants/Other Providers [Y/N]:  Prior or Outpatient Records Reviewed [Y/N]:

## 2019-12-11 NOTE — PROGRESS NOTE ADULT - ASSESSMENT
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 She reports daily cough, productive of yellow to green sputum, no hemoptysis, associated with mild chest tightness. Recently had a bronchoscopy with Dr. Mcclellan at [facility] - sputum cultures grew pseudomonas, proteus and acinetobacter     Last admitted 8/2019 for asthma exacerbation 2/2 entero/rhinovirus respiratory infection and prior to that 2/2019 for coronavirus resp infection. CT chest: new multiple small nodules, RUL and RML   A/P:   Severe allergic asthma, recently started on Dupixent, Chronic steroid use c/w influenza A  **********************  12/9- better over all  12/10-ID evaln-no need for IV abx  12/11-better

## 2019-12-11 NOTE — PROGRESS NOTE ADULT - PROBLEM SELECTOR PLAN 7
w/hyperglycemia, on long term insulin  - increased insulin to account for 10 units of coverage - Lantus 12  - added premeal as still elevated  - steroid induced hyperglycemia is present

## 2019-12-11 NOTE — DISCHARGE NOTE PROVIDER - NSDCFUSCHEDAPPT_GEN_ALL_CORE_FT
RAYRAY RODRIGUEZ ; 01/15/2020 ; [facility] [address] [facility] RAYRAY RODRIGUEZ ; 01/15/2020 ; [facility] [address] [facility] RAYRAY RODRIGUEZ ; 01/15/2020 ; [facility] [address] [facility] RAYRAY RODRIGUEZ ; 01/15/2020 ; [facility] [address] [facility] RAYRAY RODRIGUEZ ; 01/15/2020 ; [facility] [address] [facility] RAYRAY RODRIGUEZ ; 01/15/2020 ; [facility] [address] [facility] RAYRAY RODRIGUEZ ; 01/15/2020 ; [facility] [address] [facility] RAYRAY RODRIGUEZ ; 12/18/2019 ; [facility] [address] [facility]  RAYRAY RODRIGUEZ ; 01/15/2020 ; [facility] [address] [facility] RAYRAY RODRIGUEZ ; 12/18/2019 ; [facility] [address] [facility]  RAYRAY RODRIGUEZ ; 01/15/2020 ; [facility] [address] [facility] RAYRAY RODRIGUEZ ; 12/18/2019 ; [facility] [address] [facility]  RAYRAY RODRIGUEZ ; 01/15/2020 ; [facility] [address] [facility] RAYRAY RODRIGUEZ ; 12/18/2019 ; [facility] [address] [facility]  RAYRAY RODRIGUEZ ; 01/15/2020 ; [facility] [address] [facility] RAYRAY RODRIGUEZ ; 12/18/2019 ; [facility] [address] [facility]  RAYRAY RODRIGUEZ ; 01/15/2020 ; [facility] [address] [facility] RAYRAY RODRIGUEZ ; 12/18/2019 ; [facility] [address] [facility]  RAYRAY RODRIGUEZ ; 01/15/2020 ; [facility] [address] [facility]

## 2019-12-11 NOTE — PROGRESS NOTE ADULT - PROBLEM SELECTOR PLAN 1
Flu A superimproved on tracheobronchomalacia flare   - patient presented with shortness of breath and borderline fever on steroids with flu A positivity   - duoneb PRN, budesonide nebs, spiriva and acapella, hypersal BID. inhaled tobramycin given recent bronchoscopy in November and proteus cultures. medrol 32mg daily w/taper per Dr Allison  ID/pulm appreciated  monitor off abx  c/w tobra and steroids    given stability off abx I suspect she had NO superimposed PNA, only Flu respiratory infection +/- flare of asthma/TBM

## 2019-12-11 NOTE — DISCHARGE NOTE NURSING/CASE MANAGEMENT/SOCIAL WORK - PATIENT PORTAL LINK FT
You can access the FollowMyHealth Patient Portal offered by [facility] by registering at the following website: [email or web address]. By joining [employer]’s FollowMyHealth portal, you will also be able to view your health information using other applications (apps) compatible with our system.

## 2019-12-11 NOTE — DISCHARGE NOTE PROVIDER - NSDCCPCAREPLAN_GEN_ALL_CORE_FT
PRINCIPAL DISCHARGE DIAGNOSIS  Diagnosis: Influenza A  Assessment and Plan of Treatment: Influenza is a lung infection that can cause a fever, cough, and trouble breathing.  You have completed your course of tamiflu  Nutrition is important, eat small frequent meals.  Get lots of rest and drink fluids.  Call your health care provider upon arrival home from hospital and make a follow up appointment for one week.  If your cough worsens, you develop fever greater than 101', you have shaking chills, a fast heartbeat, trouble breathing and/or feel your are breathing much faster than usual, call your healthcare provider.  Make sure you wash your hands frequently.      SECONDARY DISCHARGE DIAGNOSES  Diagnosis: Atrial fibrillation  Assessment and Plan of Treatment: Atrial fibrillation  Atrial fibrillation is the most common heart rhythm problem & has the risk of stroke & heart attack  It helps if you control your blood pressure, not drink more than 1-2 alcohol drinks per day, cut down on caffeine, getting treatment for over active thyroid gland, & getting exercise  Call your doctor if you feel your heart racing or beating unusually, chest tightness or pain, lightheaded, faint, shortness of breath especially with exercise  It is important to take your heart medication as prescribed  You may be on anticoagulation which is very important to take as directed - you may need blood work to monitor drug levels      Diagnosis: Pneumonia  Assessment and Plan of Treatment: Pneumonia is a lung infection that can cause a fever, cough, and trouble breathing.  Please follow up with Dr Allison in 1-2 weeks  Continue all antibiotics as ordered until complete.  Nutrition is important, eat small frequent meals.  Get lots of rest and drink fluids.  Call your health care provider upon arrival home from hospital and make a follow up appointment for one week.  If your cough worsens, you develop fever greater than 101', you have shaking chills, a fast heartbeat, trouble breathing and/or feel your are breathing much faster than usual, call your healthcare provider.  Make sure you wash your hands frequently.      Diagnosis: Asthma  Assessment and Plan of Treatment: Asthma  Asthma attacks happen when the airways in the lungs become narrow and inflamed  Asthma symptoms can include - Wheezing or noisy breathing, coughing, tight feeling in the chest, shortness of breath  Almost everyone with asthma has a quick-relief inhaler that works in 5- 10mins that they carry with them and long-term controller medicines control asthma and prevent future symptoms. People with frequent asthma symptoms take these 1 or 2 times each day.  You can stay away from things that cause your symptoms or make them worse. Doctors call these "triggers."  avoid them as much as possible. Some common triggers include - Dust, mold, animals, such as dogs and cats, pollen and plants, cigarette smoke, getting sick with a cold or flu (that's why it's important to get a flu shot), stress  Talk to your caregiver about an action plan for managing asthma attacks. This includes the use of a peak flow meter which measures the severity of the attack and medicines that can help stop the attack.   SEEK MEDICAL CARE IF:  You have wheezing, shortness of breath, or a cough even if taking medicine to prevent attacks.   You have thickening of sputum, sputum changes from clear or white to yellow, green, gray, or bloody.   You have any problems that may be related to the medicines you are taking (such as a rash, itching, swelling, or trouble breathing).  You are using a reliever medicine more than 2–3 times per week.  Your peak flow is still at 50–79% of personal best after following your action plan for 1 hour.  SEEK IMMEDIATE MEDICAL CARE IF:  You are short of breath even at rest,or with very little physical activity, chest pain, heart beating fast, bluish color to your lips or fingernails, fever, dizziness,   You seem to be getting worse and are unresponsive to treatment during an asthma attack.   Your peak flow is less than 50% of personal best.      Diagnosis: Diabetes  Assessment and Plan of Treatment: Diabetes  HgA1C this admission.  Make sure you get your HgA1c checked every three months.  If you take oral diabetes medications, check your blood glucose two times a day.  If you take insulin, check your blood glucose before meals and at bedtime.  It's important not to skip any meals.  Keep a log of your blood glucose results and always take it with you to your doctor appointments.  Keep a list of your current medications including injectables and over the counter medications and bring this medication list with you to all your doctor appointments.  If you have not seen your opthalmologist this year call for appointment.  Check your feet daily for redness, sores, or openings. Do not self treat. If no improvement in two days call your primary care physician for an appointment.  Low blood sugar (hypoglycemia) is a blood sugar below 70mg/dl. Check your blood sugar if you feel signs/symptoms of hypoglycemia. If your blood sugar is below 70 take 15 grams of carbohydrates (ex 4 oz of apple juice, 3-4 glucosr tablets, or 4-6 oz of regular soda) wait 15 minutes and repeat blood sugar to make sure it comes up above 70.  If your blood sugar is above 70 and you are due for a meal, have a meal.  If you are not due for a meal have a snack.  This snack helps keeps your blood sugar at a safe range. PRINCIPAL DISCHARGE DIAGNOSIS  Diagnosis: Influenza A  Assessment and Plan of Treatment: Influenza is a lung infection that can cause a fever, cough, and trouble breathing.  You have completed your course of tamiflu  Nutrition is important, eat small frequent meals.  Get lots of rest and drink fluids.  Call your health care provider upon arrival home from hospital and make a follow up appointment for one week.  If your cough worsens, you develop fever greater than 101', you have shaking chills, a fast heartbeat, trouble breathing and/or feel your are breathing much faster than usual, call your healthcare provider.  Make sure you wash your hands frequently.      SECONDARY DISCHARGE DIAGNOSES  Diagnosis: Pneumonia  Assessment and Plan of Treatment: Pneumonia is a lung infection that can cause a fever, cough, and trouble breathing.  Please follow up with Dr Allison in 1-2 weeks  Continue all antibiotics as ordered until complete.  Nutrition is important, eat small frequent meals.  Get lots of rest and drink fluids.  Call your health care provider upon arrival home from hospital and make a follow up appointment for one week.  If your cough worsens, you develop fever greater than 101', you have shaking chills, a fast heartbeat, trouble breathing and/or feel your are breathing much faster than usual, call your healthcare provider.  Make sure you wash your hands frequently.      Diagnosis: Asthma  Assessment and Plan of Treatment: Asthma  Asthma attacks happen when the airways in the lungs become narrow and inflamed  Asthma symptoms can include - Wheezing or noisy breathing, coughing, tight feeling in the chest, shortness of breath  Almost everyone with asthma has a quick-relief inhaler that works in 5- 10mins that they carry with them and long-term controller medicines control asthma and prevent future symptoms. People with frequent asthma symptoms take these 1 or 2 times each day.  You can stay away from things that cause your symptoms or make them worse. Doctors call these "triggers."  avoid them as much as possible. Some common triggers include - Dust, mold, animals, such as dogs and cats, pollen and plants, cigarette smoke, getting sick with a cold or flu (that's why it's important to get a flu shot), stress  Talk to your caregiver about an action plan for managing asthma attacks. This includes the use of a peak flow meter which measures the severity of the attack and medicines that can help stop the attack.   SEEK MEDICAL CARE IF:  You have wheezing, shortness of breath, or a cough even if taking medicine to prevent attacks.   You have thickening of sputum, sputum changes from clear or white to yellow, green, gray, or bloody.   You have any problems that may be related to the medicines you are taking (such as a rash, itching, swelling, or trouble breathing).  You are using a reliever medicine more than 2–3 times per week.  Your peak flow is still at 50–79% of personal best after following your action plan for 1 hour.  SEEK IMMEDIATE MEDICAL CARE IF:  You are short of breath even at rest,or with very little physical activity, chest pain, heart beating fast, bluish color to your lips or fingernails, fever, dizziness,   You seem to be getting worse and are unresponsive to treatment during an asthma attack.   Your peak flow is less than 50% of personal best.      Diagnosis: Atrial fibrillation  Assessment and Plan of Treatment: Atrial fibrillation  Atrial fibrillation is the most common heart rhythm problem & has the risk of stroke & heart attack  It helps if you control your blood pressure, not drink more than 1-2 alcohol drinks per day, cut down on caffeine, getting treatment for over active thyroid gland, & getting exercise  Call your doctor if you feel your heart racing or beating unusually, chest tightness or pain, lightheaded, faint, shortness of breath especially with exercise  It is important to take your heart medication as prescribed  You may be on anticoagulation which is very important to take as directed - you may need blood work to monitor drug levels      Diagnosis: Diabetes  Assessment and Plan of Treatment: Diabetes  HgA1C this admission.  Make sure you get your HgA1c checked every three months.  If you take oral diabetes medications, check your blood glucose two times a day.  If you take insulin, check your blood glucose before meals and at bedtime.  It's important not to skip any meals.  Keep a log of your blood glucose results and always take it with you to your doctor appointments.  Keep a list of your current medications including injectables and over the counter medications and bring this medication list with you to all your doctor appointments.  If you have not seen your opthalmologist this year call for appointment.  Check your feet daily for redness, sores, or openings. Do not self treat. If no improvement in two days call your primary care physician for an appointment.  Low blood sugar (hypoglycemia) is a blood sugar below 70mg/dl. Check your blood sugar if you feel signs/symptoms of hypoglycemia. If your blood sugar is below 70 take 15 grams of carbohydrates (ex 4 oz of apple juice, 3-4 glucosr tablets, or 4-6 oz of regular soda) wait 15 minutes and repeat blood sugar to make sure it comes up above 70.  If your blood sugar is above 70 and you are due for a meal, have a meal.  If you are not due for a meal have a snack.  This snack helps keeps your blood sugar at a safe range. PRINCIPAL DISCHARGE DIAGNOSIS  Diagnosis: Influenza A  Assessment and Plan of Treatment: Influenza is a lung infection that can cause a fever, cough, and trouble breathing.  You have completed your course of tamiflu  Nutrition is important, eat small frequent meals.  Get lots of rest and drink fluids.  Call your health care provider upon arrival home from hospital and make a follow up appointment for one week.  If your cough worsens, you develop fever greater than 101', you have shaking chills, a fast heartbeat, trouble breathing and/or feel your are breathing much faster than usual, call your healthcare provider.  Make sure you wash your hands frequently.      SECONDARY DISCHARGE DIAGNOSES  Diagnosis: Pneumonia  Assessment and Plan of Treatment: Pneumonia is a lung infection that can cause a fever, cough, and trouble breathing.  Please follow up with Dr Allison in 1-2 weeks  Continue all antibiotics as ordered until complete.  Nutrition is important, eat small frequent meals.  Get lots of rest and drink fluids.  Call your health care provider upon arrival home from hospital and make a follow up appointment for one week.  If your cough worsens, you develop fever greater than 101', you have shaking chills, a fast heartbeat, trouble breathing and/or feel your are breathing much faster than usual, call your healthcare provider.  Make sure you wash your hands frequently.      Diagnosis: Asthma  Assessment and Plan of Treatment: Asthma  Asthma attacks happen when the airways in the lungs become narrow and inflamed  Asthma symptoms can include - Wheezing or noisy breathing, coughing, tight feeling in the chest, shortness of breath  Almost everyone with asthma has a quick-relief inhaler that works in 5- 10mins that they carry with them and long-term controller medicines control asthma and prevent future symptoms. People with frequent asthma symptoms take these 1 or 2 times each day.  You can stay away from things that cause your symptoms or make them worse. Doctors call these "triggers."  avoid them as much as possible. Some common triggers include - Dust, mold, animals, such as dogs and cats, pollen and plants, cigarette smoke, getting sick with a cold or flu (that's why it's important to get a flu shot), stress  Talk to your caregiver about an action plan for managing asthma attacks. This includes the use of a peak flow meter which measures the severity of the attack and medicines that can help stop the attack.   SEEK MEDICAL CARE IF:  You have wheezing, shortness of breath, or a cough even if taking medicine to prevent attacks.   You have thickening of sputum, sputum changes from clear or white to yellow, green, gray, or bloody.   You have any problems that may be related to the medicines you are taking (such as a rash, itching, swelling, or trouble breathing).  You are using a reliever medicine more than 2–3 times per week.  Your peak flow is still at 50–79% of personal best after following your action plan for 1 hour.  SEEK IMMEDIATE MEDICAL CARE IF:  You are short of breath even at rest,or with very little physical activity, chest pain, heart beating fast, bluish color to your lips or fingernails, fever, dizziness,   You seem to be getting worse and are unresponsive to treatment during an asthma attack.   Your peak flow is less than 50% of personal best.      Diagnosis: Atrial fibrillation  Assessment and Plan of Treatment: Atrial fibrillation  Atrial fibrillation is the most common heart rhythm problem & has the risk of stroke & heart attack  It helps if you control your blood pressure, not drink more than 1-2 alcohol drinks per day, cut down on caffeine, getting treatment for over active thyroid gland, & getting exercise  Call your doctor if you feel your heart racing or beating unusually, chest tightness or pain, lightheaded, faint, shortness of breath especially with exercise  It is important to take your heart medication as prescribed  You may be on anticoagulation which is very important to take as directed - you may need blood work to monitor drug levels      Diagnosis: Diabetes  Assessment and Plan of Treatment:   Make sure you get your HgA1c checked every three months.  If you take oral diabetes medications, check your blood glucose two times a day.  If you take insulin, check your blood glucose before meals and at bedtime.  It's important not to skip any meals.  Keep a log of your blood glucose results and always take it with you to your doctor appointments.  Keep a list of your current medications including injectables and over the counter medications and bring this medication list with you to all your doctor appointments.  If you have not seen your opthalmologist this year call for appointment.  Check your feet daily for redness, sores, or openings. Do not self treat. If no improvement in two days call your primary care physician for an appointment.  Low blood sugar (hypoglycemia) is a blood sugar below 70mg/dl. Check your blood sugar if you feel signs/symptoms of hypoglycemia. If your blood sugar is below 70 take 15 grams of carbohydrates (ex 4 oz of apple juice, 3-4 glucosr tablets, or 4-6 oz of regular soda) wait 15 minutes and repeat blood sugar to make sure it comes up above 70.  If your blood sugar is above 70 and you are due for a meal, have a meal.  If you are not due for a meal have a snack.  This snack helps keeps your blood sugar at a safe range.

## 2019-12-11 NOTE — DISCHARGE NOTE NURSING/CASE MANAGEMENT/SOCIAL WORK - NSDCFUADDAPPT_GEN_ALL_CORE_FT
Please follow up with Pulmonolgist Dr Allison in 1-2 weeks after discharge.  Please follow up with your Primary care physician 1-2 weeks after discharge.

## 2019-12-11 NOTE — DIETITIAN INITIAL EVALUATION ADULT. - PROBLEM SELECTOR PLAN 7
- patient with hx colorectal cancer s/p chemoXRT and resection now with diverting ileostomy, follows at the [facility]   - as per patient GEETHA

## 2019-12-11 NOTE — DIETITIAN INITIAL EVALUATION ADULT. - FACTORS AFF FOOD INTAKE
pt reports good appetite and intake in house, consuming 100% of her meals; denies any chewing/swallowing issues, reports ostomy output had declined, but after receiving laxatives, she has started to have some output

## 2019-12-11 NOTE — DISCHARGE NOTE PROVIDER - HOSPITAL COURSE
she was admitted with viral sepsis due to flu A. Briefly treated for pna empirically but was stopped after short course and pt tolerated well - doubt presence of PNA. Steroids for asthma/TBM flare.     Patient will follow up with pulm Dr Allison ( she will call for appt) for steroid mgmt    She is aware of recommendation to follow up with immunology as well    Discharge planning today, not hypoxic, on po and inhaled meds here. 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 Briefly treated for pna empirically but was stopped after short course and pt tolerated well - doubt presence of PNA. Steroids for asthma/TBM flare.     Patient will follow up with pulm Dr Allison ( she will call for appt) for steroid mgmt    She is aware of recommendation to follow up with immunology as well    Discharge planning today, not hypoxic, on po and inhaled meds here. 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 Briefly treated for pna empirically but was stopped after short course and pt tolerated well - doubt presence of PNA. Steroids for asthma/TBM flare.     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 Briefly treated for pna empirically but was stopped after short course and pt tolerated well - doubt presence of PNA. Steroids for asthma/TBM flare.     Patient will follow up with pulm Dr Allison ( she will call for appt) for steroid mgmt    She is aware of recommendation to follow up with immunology as well    Cleared for discharge as per Dr Lema        Attending Addendum    Discharge Diagnoses    1) Viral sepsis due to Flu A (present on admission resolved on discharge)    2) Asthma Exacerbation of severe asthma    3) Tracheobronchomalacia w/flare, bronchiectasis    4) Squamous Cell Ca of Anus w/ostomy

## 2019-12-11 NOTE — PROGRESS NOTE ADULT - SUBJECTIVE AND OBJECTIVE BOX
CHIEF COMPLAINT:  f/up sob, influenza, asthma, TBM, allergy-better-only sx are asthmatic in nature-wheeze    Interval Events: ID f/up    REVIEW OF SYSTEMS:  Constitutional: No fevers or chills. No weight loss. + fatigue or generalized malaise.  Eyes: No itching or discharge from the eyes  ENT: No ear pain. No ear discharge. No nasal congestion. No post nasal drip. No epistaxis. No throat pain. No sore throat. No difficulty swallowing.   CV: No chest pain. No palpitations. No lightheadedness or dizziness.   Resp: No dyspnea at rest. + dyspnea on exertion. No orthopnea. + wheezing. No cough. No stridor. No sputum production. No chest pain with respiration.  GI: No nausea. No vomiting. No diarrhea.  MSK: No joint pain or pain in any extremities  Integumentary: No skin lesions. No pedal edema.  Neurological: No gross motor weakness. No sensory changes.  [+ ] All other systems negative  [ ] Unable to assess ROS because ________    OBJECTIVE:  ICU Vital Signs Last 24 Hrs  T(C): 36.8 (11 Dec 2019 05:21), Max: 37 (10 Dec 2019 16:27)  T(F): 98.3 (11 Dec 2019 05:21), Max: 98.6 (10 Dec 2019 16:27)  HR: 52 (11 Dec 2019 05:21) (52 - 63)  BP: 156/51 (11 Dec 2019 05:21) (147/63 - 169/77)  BP(mean): --  ABP: --  ABP(mean): --  RR: 18 (11 Dec 2019 05:21) (18 - 18)  SpO2: 99% (11 Dec 2019 05:21) (96% - 100%)        12-09 @ 07:01  -  12-10 @ 07:00  --------------------------------------------------------  IN: 1000 mL / OUT: 600 mL / NET: 400 mL    12-10 @ 07:01  -  12-11 @ 05:29  --------------------------------------------------------  IN: 1142 mL / OUT: 0 mL / NET: 1142 mL      CAPILLARY BLOOD GLUCOSE      POCT Blood Glucose.: 327 mg/dL (10 Dec 2019 23:31)      PHYSICAL EXAM: NAD in bed  General: Awake, alert, oriented X 3.   HEENT: Atraumatic, normocephalic.                 Mallampatti Grade 2                No nasal congestion.                No tonsillar or pharyngeal exudates.  Lymph Nodes: No palpable lymphadenopathy  Neck: No JVD. No carotid bruit.   Respiratory: Normal chest expansion                         Normal percussion                         Normal and equal air entry                         + wheeze, no rhonchi or rales.  Cardiovascular: S1 S2 normal. No murmurs, rubs or gallops.   Abdomen: Soft, non-tender, non-distended. No organomegaly. Normoactive bowel sounds.  Extremities: Warm to touch. Peripheral pulse palpable. No pedal edema.   Skin: No rashes or skin lesions  Neurological: Motor and sensory examination equal and normal in all four extremities.  Psychiatry: Appropriate mood and affect.    HOSPITAL MEDICATIONS:  MEDICATIONS  (STANDING):  amitriptyline 10 milliGRAM(s) Oral every 8 hours  apixaban 5 milliGRAM(s) Oral every 12 hours  atorvastatin 20 milliGRAM(s) Oral at bedtime  budesonide 160 MICROgram(s)/formoterol 4.5 MICROgram(s) Inhaler 2 Puff(s) Inhalation two times a day  chlorhexidine 4% Liquid 1 Application(s) Topical daily  dextrose 5%. 1000 milliLiter(s) (50 mL/Hr) IV Continuous <Continuous>  dextrose 50% Injectable 12.5 Gram(s) IV Push once  dextrose 50% Injectable 25 Gram(s) IV Push once  dextrose 50% Injectable 25 Gram(s) IV Push once  digoxin     Tablet 0.25 milliGRAM(s) Oral daily  insulin glargine Injectable (LANTUS) 12 Unit(s) SubCutaneous at bedtime  insulin lispro (HumaLOG) corrective regimen sliding scale   SubCutaneous three times a day before meals  insulin lispro (HumaLOG) corrective regimen sliding scale   SubCutaneous at bedtime  lactated ringers. 1000 milliLiter(s) (25 mL/Hr) IV Continuous <Continuous>  methylPREDNISolone 32 milliGRAM(s) Oral daily  montelukast 10 milliGRAM(s) Oral at bedtime  oseltamivir 75 milliGRAM(s) Oral two times a day  pantoprazole    Tablet 40 milliGRAM(s) Oral before breakfast  sodium chloride 0.9% for Nebulization 3 milliLiter(s) Nebulizer two times a day  tiotropium 18 MICROgram(s) Capsule 1 Capsule(s) Inhalation daily  tobramycin for Nebulization 300 milliGRAM(s) Inhalation every 12 hours    MEDICATIONS  (PRN):  acetaminophen   Tablet .. 650 milliGRAM(s) Oral every 6 hours PRN Temp greater or equal to 38C (100.4F)  albuterol/ipratropium for Nebulization 3 milliLiter(s) Nebulizer every 6 hours PRN Shortness of Breath and/or Wheezing  dextrose 40% Gel 15 Gram(s) Oral once PRN Blood Glucose LESS THAN 70 milliGRAM(s)/deciliter  glucagon  Injectable 1 milliGRAM(s) IntraMuscular once PRN Glucose LESS THAN 70 milligrams/deciliter  ondansetron Injectable 4 milliGRAM(s) IV Push every 8 hours PRN Nausea and/or Vomiting      LABS:                        11.5   6.40  )-----------( 243      ( 10 Dec 2019 09:17 )             37.8     12-10    138  |  98  |  19  ----------------------------<  347<H>  4.8   |  28  |  0.64    Ca    9.0      10 Dec 2019 07:44                MICROBIOLOGY:     RADIOLOGY:  [ ] Reviewed and interpreted by me    Point of Care Ultrasound Findings:    PFT:    EKG:

## 2019-12-11 NOTE — PROGRESS NOTE ADULT - PROBLEM SELECTOR PLAN 3
- patient on chronic low dose asthma (was down to 4mg methylpred) before recent exacerbation, now on solumedrol transition to medrol  - need to be mindful, if patient becomes hypotensive --> will need to consider hydrocortisone 50 q8 as she already got stress dose in the ED

## 2019-12-11 NOTE — DISCHARGE NOTE PROVIDER - NSDCFUADDAPPT_GEN_ALL_CORE_FT
Please follow up with Pulmonolgist Dr Allison in 1-2 weeks after discharge. Please follow up with Pulmonolgist Dr Allison in 1-2 weeks after discharge.  Please follow up with your Primary care physician 1-2 weeks after discharge.

## 2019-12-11 NOTE — DISCHARGE NOTE PROVIDER - NSDCMRMEDTOKEN_GEN_ALL_CORE_FT
apixaban 5 mg oral tablet: 1 tab(s) orally every 12 hours  budesonide: 1 vial inhaled 2 times a day  cefuroxime 500 mg oral tablet: 1 tab(s) orally every 12 hours  Crestor 5 mg oral tablet: 1 tab(s) orally once a day  digoxin 250 mcg (0.25 mg) oral tablet: 1 tab(s) orally once a day  Dupixent 300 mg/2 mL subcutaneous solution: 1  subcutaneous every 2 weeks  ipratropium-albuterol 0.5 mg-2.5 mg/3 mLinhalation solution: 3 milliliter(s) inhaled every 6 hours  Lantus 100 units/mL subcutaneous solution: 10 unit(s) subcutaneous once a day (in the morning)  methylPREDNISolone 8 mg oral tablet: 1 tab(s) orally 2 times a day  montelukast 10 mg oral tablet: 1 tab(s) orally once a day   NovoLOG 100 units/mL subcutaneous solution: per sliding scale with meals    NOTE: pharmacy dispensed humalog 100units/ml 60 units every 8 hours.  Perforomist 20 mcg/2 mL inhalation solution: 2 milliliter(s) inhaled 2 times a day  Protonix 40 mg oral delayed release tablet: 1 tab(s) orally once a day - 1/2 hour before breakfast  tiotropium 18 mcg inhalation capsule: 1 cap(s) inhaled once a day  tobramycin: 300 milligram(s) inhaled 2 times a day  Victoza 18 mg/3 mL subcutaneous solution: 1.8 milligram(s) subcutaneous once a day amitriptyline 10 mg oral tablet: 1 tab(s) orally every 8 hours  apixaban 5 mg oral tablet: 1 tab(s) orally every 12 hours  budesonide-formoterol 160 mcg-4.5 mcg/inh inhalation aerosol: 2 puff(s) inhaled 2 times a day  Crestor 5 mg oral tablet: 1 tab(s) orally once a day  digoxin 250 mcg (0.25 mg) oral tablet: 1 tab(s) orally once a day  Dupixent 300 mg/2 mL subcutaneous solution: 1  subcutaneous every 2 weeks  ipratropium-albuterol 0.5 mg-2.5 mg/3 mLinhalation solution: 3 milliliter(s) inhaled every 6 hours  Lantus 100 units/mL subcutaneous solution: 12 unit(s) subcutaneous once a day (in the morning)  methylPREDNISolone 8 mg oral tablet: 2 tab(s) orally 2 times a day for 5 days  2 tabs in morning and 1 tab in evening orally for 4 days  montelukast 10 mg oral tablet: 1 tab(s) orally once a day   NovoLOG 100 units/mL subcutaneous solution: per sliding scale with meals    NOTE: pharmacy dispensed humalog 100units/ml 60 units every 8 hours.  Perforomist 20 mcg/2 mL inhalation solution: 2 milliliter(s) inhaled 2 times a day  Protonix 40 mg oral delayed release tablet: 1 tab(s) orally once a day - 1/2 hour before breakfast  sodium chloride 0.9% inhalation solution: 3 milliliter(s) inhaled 2 times a day  tiotropium 18 mcg inhalation capsule: 1 cap(s) inhaled once a day  tobramycin: 300 milligram(s) inhaled 2 times a day  Victoza 18 mg/3 mL subcutaneous solution: 1.8 milligram(s) subcutaneous once a day amitriptyline 10 mg oral tablet: 1 tab(s) orally every 8 hours  apixaban 5 mg oral tablet: 1 tab(s) orally every 12 hours  Crestor 5 mg oral tablet: 1 tab(s) orally once a day  digoxin 250 mcg (0.25 mg) oral tablet: 1 tab(s) orally once a day  Dupixent 300 mg/2 mL subcutaneous solution: 1  subcutaneous every 2 weeks  ipratropium-albuterol 0.5 mg-2.5 mg/3 mLinhalation solution: 3 milliliter(s) inhaled every 6 hours  Lantus 100 units/mL subcutaneous solution: 12 unit(s) subcutaneous once a day (in the morning)  methylPREDNISolone 8 mg oral tablet: 2 tab(s) orally 2 times a day for 5 days  2 tabs in morning and 1 tab in evening orally for 4 days  montelukast 10 mg oral tablet: 1 tab(s) orally once a day   Perforomist 20 mcg/2 mL inhalation solution: 2 milliliter(s) inhaled 2 times a day  Protonix 40 mg oral delayed release tablet: 1 tab(s) orally once a day - 1/2 hour before breakfast  sodium chloride 0.9% inhalation solution: 3 milliliter(s) inhaled 2 times a day  tiotropium 18 mcg inhalation capsule: 1 cap(s) inhaled once a day  tobramycin: 300 milligram(s) inhaled 2 times a day  Victoza 18 mg/3 mL subcutaneous solution: 1.8 milligram(s) subcutaneous once a day

## 2019-12-12 VITALS
OXYGEN SATURATION: 95 % | RESPIRATION RATE: 18 BRPM | DIASTOLIC BLOOD PRESSURE: 69 MMHG | SYSTOLIC BLOOD PRESSURE: 145 MMHG | HEART RATE: 67 BPM | TEMPERATURE: 98 F

## 2019-12-12 LAB
CULTURE RESULTS: SIGNIFICANT CHANGE UP
CULTURE RESULTS: SIGNIFICANT CHANGE UP
GLUCOSE BLDC GLUCOMTR-MCNC: 204 MG/DL — HIGH (ref 70–99)
GLUCOSE BLDC GLUCOMTR-MCNC: 232 MG/DL — HIGH (ref 70–99)
GLUCOSE BLDC GLUCOMTR-MCNC: 260 MG/DL — HIGH (ref 70–99)
SPECIMEN SOURCE: SIGNIFICANT CHANGE UP
SPECIMEN SOURCE: SIGNIFICANT CHANGE UP

## 2019-12-12 PROCEDURE — 81001 URINALYSIS AUTO W/SCOPE: CPT

## 2019-12-12 PROCEDURE — 71250 CT THORAX DX C-: CPT

## 2019-12-12 PROCEDURE — 87070 CULTURE OTHR SPECIMN AEROBIC: CPT

## 2019-12-12 PROCEDURE — 87631 RESP VIRUS 3-5 TARGETS: CPT

## 2019-12-12 PROCEDURE — 94640 AIRWAY INHALATION TREATMENT: CPT

## 2019-12-12 PROCEDURE — 87040 BLOOD CULTURE FOR BACTERIA: CPT

## 2019-12-12 PROCEDURE — 99239 HOSP IP/OBS DSCHRG MGMT >30: CPT

## 2019-12-12 PROCEDURE — 85610 PROTHROMBIN TIME: CPT

## 2019-12-12 PROCEDURE — 85730 THROMBOPLASTIN TIME PARTIAL: CPT

## 2019-12-12 PROCEDURE — 84100 ASSAY OF PHOSPHORUS: CPT

## 2019-12-12 PROCEDURE — 82803 BLOOD GASES ANY COMBINATION: CPT

## 2019-12-12 PROCEDURE — 84295 ASSAY OF SERUM SODIUM: CPT

## 2019-12-12 PROCEDURE — 85027 COMPLETE CBC AUTOMATED: CPT

## 2019-12-12 PROCEDURE — 96374 THER/PROPH/DIAG INJ IV PUSH: CPT

## 2019-12-12 PROCEDURE — 82947 ASSAY GLUCOSE BLOOD QUANT: CPT

## 2019-12-12 PROCEDURE — 99232 SBSQ HOSP IP/OBS MODERATE 35: CPT

## 2019-12-12 PROCEDURE — 82330 ASSAY OF CALCIUM: CPT

## 2019-12-12 PROCEDURE — 83605 ASSAY OF LACTIC ACID: CPT

## 2019-12-12 PROCEDURE — 93005 ELECTROCARDIOGRAM TRACING: CPT

## 2019-12-12 PROCEDURE — 83036 HEMOGLOBIN GLYCOSYLATED A1C: CPT

## 2019-12-12 PROCEDURE — 84484 ASSAY OF TROPONIN QUANT: CPT

## 2019-12-12 PROCEDURE — 83735 ASSAY OF MAGNESIUM: CPT

## 2019-12-12 PROCEDURE — 87449 NOS EACH ORGANISM AG IA: CPT

## 2019-12-12 PROCEDURE — 83880 ASSAY OF NATRIURETIC PEPTIDE: CPT

## 2019-12-12 PROCEDURE — 87086 URINE CULTURE/COLONY COUNT: CPT

## 2019-12-12 PROCEDURE — 74176 CT ABD & PELVIS W/O CONTRAST: CPT

## 2019-12-12 PROCEDURE — 80053 COMPREHEN METABOLIC PANEL: CPT

## 2019-12-12 PROCEDURE — 96375 TX/PRO/DX INJ NEW DRUG ADDON: CPT

## 2019-12-12 PROCEDURE — 82435 ASSAY OF BLOOD CHLORIDE: CPT

## 2019-12-12 PROCEDURE — 99285 EMERGENCY DEPT VISIT HI MDM: CPT | Mod: 25

## 2019-12-12 PROCEDURE — 85014 HEMATOCRIT: CPT

## 2019-12-12 PROCEDURE — 82962 GLUCOSE BLOOD TEST: CPT

## 2019-12-12 PROCEDURE — 93308 TTE F-UP OR LMTD: CPT

## 2019-12-12 PROCEDURE — 96376 TX/PRO/DX INJ SAME DRUG ADON: CPT

## 2019-12-12 PROCEDURE — 84132 ASSAY OF SERUM POTASSIUM: CPT

## 2019-12-12 PROCEDURE — 80048 BASIC METABOLIC PNL TOTAL CA: CPT

## 2019-12-12 PROCEDURE — 71045 X-RAY EXAM CHEST 1 VIEW: CPT

## 2019-12-12 RX ORDER — CHLORHEXIDINE GLUCONATE 213 G/1000ML
1 SOLUTION TOPICAL DAILY
Refills: 0 | Status: DISCONTINUED | OUTPATIENT
Start: 2019-12-12 | End: 2019-12-12

## 2019-12-12 RX ORDER — SODIUM CHLORIDE 9 MG/ML
3 INJECTION INTRAMUSCULAR; INTRAVENOUS; SUBCUTANEOUS
Qty: 200 | Refills: 0
Start: 2019-12-12 | End: 2020-01-10

## 2019-12-12 RX ORDER — BENZOCAINE AND MENTHOL 5; 1 G/100ML; G/100ML
1 LIQUID ORAL
Refills: 0 | Status: DISCONTINUED | OUTPATIENT
Start: 2019-12-12 | End: 2019-12-12

## 2019-12-12 RX ORDER — CEFUROXIME AXETIL 250 MG
1 TABLET ORAL
Qty: 0 | Refills: 0 | DISCHARGE

## 2019-12-12 RX ORDER — BUDESONIDE AND FORMOTEROL FUMARATE DIHYDRATE 160; 4.5 UG/1; UG/1
2 AEROSOL RESPIRATORY (INHALATION)
Qty: 0 | Refills: 0 | DISCHARGE
Start: 2019-12-12

## 2019-12-12 RX ORDER — AMITRIPTYLINE HCL 25 MG
1 TABLET ORAL
Qty: 90 | Refills: 0
Start: 2019-12-12 | End: 2020-01-10

## 2019-12-12 RX ORDER — INSULIN ASPART 100 [IU]/ML
0 INJECTION, SOLUTION SUBCUTANEOUS
Qty: 0 | Refills: 0 | DISCHARGE

## 2019-12-12 RX ORDER — BUDESONIDE, MICRONIZED 100 %
1 POWDER (GRAM) MISCELLANEOUS
Qty: 0 | Refills: 0 | DISCHARGE

## 2019-12-12 RX ORDER — INSULIN GLARGINE 100 [IU]/ML
10 INJECTION, SOLUTION SUBCUTANEOUS
Qty: 0 | Refills: 0 | DISCHARGE

## 2019-12-12 RX ADMIN — Medication 3 MILLILITER(S): at 05:34

## 2019-12-12 RX ADMIN — SODIUM CHLORIDE 25 MILLILITER(S): 9 INJECTION, SOLUTION INTRAVENOUS at 09:33

## 2019-12-12 RX ADMIN — Medication 2: at 14:12

## 2019-12-12 RX ADMIN — Medication 16 MILLIGRAM(S): at 05:35

## 2019-12-12 RX ADMIN — BUDESONIDE AND FORMOTEROL FUMARATE DIHYDRATE 2 PUFF(S): 160; 4.5 AEROSOL RESPIRATORY (INHALATION) at 05:34

## 2019-12-12 RX ADMIN — Medication 5 UNIT(S): at 09:32

## 2019-12-12 RX ADMIN — Medication 5 UNIT(S): at 14:11

## 2019-12-12 RX ADMIN — SODIUM CHLORIDE 3 MILLILITER(S): 9 INJECTION INTRAMUSCULAR; INTRAVENOUS; SUBCUTANEOUS at 05:35

## 2019-12-12 RX ADMIN — APIXABAN 5 MILLIGRAM(S): 2.5 TABLET, FILM COATED ORAL at 09:32

## 2019-12-12 RX ADMIN — PANTOPRAZOLE SODIUM 40 MILLIGRAM(S): 20 TABLET, DELAYED RELEASE ORAL at 05:35

## 2019-12-12 RX ADMIN — Medication 3: at 09:32

## 2019-12-12 RX ADMIN — Medication 0.25 MILLIGRAM(S): at 09:32

## 2019-12-12 RX ADMIN — TIOTROPIUM BROMIDE 1 CAPSULE(S): 18 CAPSULE ORAL; RESPIRATORY (INHALATION) at 09:33

## 2019-12-12 RX ADMIN — Medication 300 MILLIGRAM(S): at 06:52

## 2019-12-12 RX ADMIN — Medication 75 MILLIGRAM(S): at 05:35

## 2019-12-12 NOTE — CHART NOTE - NSCHARTNOTEFT_GEN_A_CORE
Patient seen and examined at bedside. Of note she was admitted with viral sepsis due to flu A. Briefly treated for pna empirically but was stopped after short course and pt tolerated well - doubt presence of PNA. Steroids for asthma/TBM flare.   Patient will follow up with pulm Dr Allison ( she will call for appt) for steroid mgmt  She is aware of recommendation to follow up with immunology as well  Discharge planning today, not hypoxic, on po and inhaled meds here.  35 minutes spent orchestrating discharge

## 2019-12-12 NOTE — PROGRESS NOTE ADULT - ASSESSMENT
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 She reports daily cough, productive of yellow to green sputum, no hemoptysis, associated with mild chest tightness. Recently had a bronchoscopy with Dr. Mcclellan at [facility] - sputum cultures grew pseudomonas, proteus and acinetobacter     Last admitted 8/2019 for asthma exacerbation 2/2 entero/rhinovirus respiratory infection and prior to that 2/2019 for coronavirus resp infection. CT chest: new multiple small nodules, RUL and RML   A/P:   Severe allergic asthma, recently started on Dupixent, Chronic steroid use c/w influenza A  **********************  12/9- better over all  12/10-ID evaln-no need for IV abx  12/11-better  12/112-better over all-ready to leave

## 2019-12-12 NOTE — PROGRESS NOTE ADULT - SUBJECTIVE AND OBJECTIVE BOX
CHIEF COMPLAINT: f/up sob, influenza, tbm, asthma--bettrer--ready to go home!    Interval Events: none    REVIEW OF SYSTEMS:  Constitutional: No fevers or chills. No weight loss. No fatigue or generalized malaise.  Eyes: No itching or discharge from the eyes  ENT: No ear pain. No ear discharge. No nasal congestion. No post nasal drip. No epistaxis. No throat pain. No sore throat. No difficulty swallowing.   CV: No chest pain. No palpitations. No lightheadedness or dizziness.   Resp: No dyspnea at rest. + dyspnea on exertion. No orthopnea. + wheezing. No cough. No stridor. No sputum production. No chest pain with respiration.  GI: No nausea. No vomiting. No diarrhea.  MSK: No joint pain or pain in any extremities  Integumentary: No skin lesions. No pedal edema.  Neurological: No gross motor weakness. No sensory changes.  [+ ] All other systems negative  [ ] Unable to assess ROS because ________    OBJECTIVE:  ICU Vital Signs Last 24 Hrs  T(C): 36.9 (11 Dec 2019 21:45), Max: 36.9 (11 Dec 2019 16:27)  T(F): 98.4 (11 Dec 2019 21:45), Max: 98.5 (11 Dec 2019 16:27)  HR: 51 (12 Dec 2019 05:43) (51 - 64)  BP: 167/70 (11 Dec 2019 21:45) (154/50 - 167/70)  BP(mean): --  ABP: --  ABP(mean): --  RR: 18 (12 Dec 2019 05:43) (18 - 18)  SpO2: 98% (12 Dec 2019 05:43) (95% - 100%)        12-10 @ 07:01  -  12-11 @ 07:00  --------------------------------------------------------  IN: 1142 mL / OUT: 0 mL / NET: 1142 mL    12-11 @ 07:01  -  12-12 @ 05:56  --------------------------------------------------------  IN: 200 mL / OUT: 0 mL / NET: 200 mL      CAPILLARY BLOOD GLUCOSE      POCT Blood Glucose.: 243 mg/dL (11 Dec 2019 21:14)      PHYSICAL EXAM: NAD in bed on RA  General: Awake, alert, oriented X 3.   HEENT: Atraumatic, normocephalic.                 Mallampatti Grade 2                No nasal congestion.                No tonsillar or pharyngeal exudates.  Lymph Nodes: No palpable lymphadenopathy  Neck: No JVD. No carotid bruit.   Respiratory: Normal chest expansion                         Normal percussion                         Normal and equal air entry                         + wheeze, no rhonchi or rales.  Cardiovascular: S1 S2 normal. No murmurs, rubs or gallops.   Abdomen: Soft, non-tender, non-distended. No organomegaly. Normoactive bowel sounds.  Extremities: Warm to touch. Peripheral pulse palpable. No pedal edema.   Skin: No rashes or skin lesions  Neurological: Motor and sensory examination equal and normal in all four extremities.  Psychiatry: Appropriate mood and affect.    HOSPITAL MEDICATIONS:  MEDICATIONS  (STANDING):  amitriptyline 10 milliGRAM(s) Oral every 8 hours  apixaban 5 milliGRAM(s) Oral every 12 hours  atorvastatin 20 milliGRAM(s) Oral at bedtime  budesonide 160 MICROgram(s)/formoterol 4.5 MICROgram(s) Inhaler 2 Puff(s) Inhalation two times a day  chlorhexidine 4% Liquid 1 Application(s) Topical daily  dextrose 5%. 1000 milliLiter(s) (50 mL/Hr) IV Continuous <Continuous>  dextrose 50% Injectable 12.5 Gram(s) IV Push once  dextrose 50% Injectable 25 Gram(s) IV Push once  dextrose 50% Injectable 25 Gram(s) IV Push once  digoxin     Tablet 0.25 milliGRAM(s) Oral daily  insulin glargine Injectable (LANTUS) 12 Unit(s) SubCutaneous at bedtime  insulin lispro (HumaLOG) corrective regimen sliding scale   SubCutaneous three times a day before meals  insulin lispro (HumaLOG) corrective regimen sliding scale   SubCutaneous at bedtime  insulin lispro Injectable (HumaLOG) 5 Unit(s) SubCutaneous three times a day before meals  lactated ringers. 1000 milliLiter(s) (25 mL/Hr) IV Continuous <Continuous>  methylPREDNISolone 16 milliGRAM(s) Oral two times a day  montelukast 10 milliGRAM(s) Oral at bedtime  pantoprazole    Tablet 40 milliGRAM(s) Oral before breakfast  senna 2 Tablet(s) Oral at bedtime  sodium chloride 0.9% for Nebulization 3 milliLiter(s) Nebulizer two times a day  tiotropium 18 MICROgram(s) Capsule 1 Capsule(s) Inhalation daily  tobramycin for Nebulization 300 milliGRAM(s) Inhalation every 12 hours    MEDICATIONS  (PRN):  acetaminophen   Tablet .. 650 milliGRAM(s) Oral every 6 hours PRN Temp greater or equal to 38C (100.4F)  albuterol/ipratropium for Nebulization 3 milliLiter(s) Nebulizer every 6 hours PRN Shortness of Breath and/or Wheezing  dextrose 40% Gel 15 Gram(s) Oral once PRN Blood Glucose LESS THAN 70 milliGRAM(s)/deciliter  glucagon  Injectable 1 milliGRAM(s) IntraMuscular once PRN Glucose LESS THAN 70 milligrams/deciliter  ondansetron Injectable 4 milliGRAM(s) IV Push every 8 hours PRN Nausea and/or Vomiting      LABS:                        11.5   6.40  )-----------( 243      ( 10 Dec 2019 09:17 )             37.8     12-10    138  |  98  |  19  ----------------------------<  347<H>  4.8   |  28  |  0.64    Ca    9.0      10 Dec 2019 07:44                MICROBIOLOGY:     RADIOLOGY:  [ ] Reviewed and interpreted by me    Point of Care Ultrasound Findings:    PFT:    EKG:

## 2019-12-12 NOTE — PROGRESS NOTE ADULT - REASON FOR ADMISSION
vomiting and shortness of breath

## 2019-12-12 NOTE — CDI QUERY NOTE - NSCDIOTHERTXTBX_GEN_ALL_CORE_HH
This patient was diagnosed with Influenza A viral infection, asthma exacerbation and tracheobronchomalacia. ED vitals temp 101.3F, HR 99 RR 22 -LR 1L IVF bolus given   and started on IV antibiotics for pneumonia.  Documentation indicates pneumonia   was no longer suspected.   Please specify the medical diagnosis associated with the fever, tachycardia and tachypnea in the setting of viral infection. Please document if this was;         Viral sepsis present on admission       SIRS       Other ( please specify condition)       Clinically undetermined

## 2019-12-12 NOTE — PROGRESS NOTE ADULT - ATTENDING COMMENTS
as above-better over all--off IV abx--on PO medrol 16 bid  multifactorial dyspnea-TBM, asthmatic bronchitis, debility--O2 NC sat above 90%  asthma flair-duoneb q 6, spiriva, singulair - po medrol 32 q day  TBM-amitriptyline, cpt-acapella/chest vest rx  ID-off meropenem, vanco but continue, inhaled tatum---s/p formal ID evaln  immunodeficiency--formal immunology evaln  DVT/GI evaln                                   DC planning today--medrol taper 32 for next 5 days then 24mg for 4 and pt will be seen in office  Eulalio Allison MD-Pulmonary   [phone]

## 2019-12-18 ENCOUNTER — APPOINTMENT (OUTPATIENT)
Dept: PULMONOLOGY | Facility: CLINIC | Age: 69
End: 2019-12-18
Payer: MEDICARE

## 2019-12-18 VITALS
HEIGHT: 65 IN | RESPIRATION RATE: 16 BRPM | HEART RATE: 73 BPM | DIASTOLIC BLOOD PRESSURE: 74 MMHG | OXYGEN SATURATION: 97 % | SYSTOLIC BLOOD PRESSURE: 130 MMHG | WEIGHT: 137 LBS | BODY MASS INDEX: 22.82 KG/M2

## 2019-12-18 PROCEDURE — 99214 OFFICE O/P EST MOD 30 MIN: CPT | Mod: 25

## 2019-12-18 PROCEDURE — 94618 PULMONARY STRESS TESTING: CPT

## 2019-12-18 NOTE — HISTORY OF PRESENT ILLNESS
[FreeTextEntry1] : Ms. Bloom is a 69 year old female with a history of abnormal CXR/chest CT, allergic rhinitis, severe persistent asthma, bronchiectasis, GERD, COPD, recurrent PNA, PND, s/p tracheoplasty, TBM, and SOB presenting to the office today for a follow up visit. Her chief complaint is bronchitis\par -she is s/p hospitalization with RSV \par -she reports having severe shoulder pain\par -she is still having KRAMER and fatigue since 6 days ago (when she was discharged). She does not become SOB when talking\par -she reports having intermittent hoarseness\par -she notes she is still on Tobramycin, and has 2 days left\par -she notes she is not bringing up sputum\par -she is currently on 8 & 8 mg of medrol QD\par -she notes she is using the chest vest therapy, and is using her nebulizers\par -she notes she took Dupixent yesterday\par -she reports her sinuses are clear\par -she states she is sleeping well\par -she reports her balance is poor as well\par -she denies any chest pain, chest pressure, diarrhea, constipation, dysphagia, dizziness, sour taste in the mouth, heartburn, reflux

## 2019-12-18 NOTE — REASON FOR VISIT
[Follow-Up - From Hospitalization] : a hospitalization follow-up [FreeTextEntry1] : abnormal CXR/chest CT, allergic rhinitis, severe persistent asthma, bronchiectasis, GERD, COPD, recurrent PNA, PND, s/p tracheoplasty, TBM, and SOB

## 2019-12-18 NOTE — REVIEW OF SYSTEMS
[Negative] : Sleep Disorder [Cough] : cough [As Noted in HPI] : as noted in HPI [Dyspnea] : dyspnea [Sputum] : not coughing up ~M sputum [Chest Discomfort] : no chest discomfort

## 2019-12-18 NOTE — ASSESSMENT
[FreeTextEntry1] : Ms. Bloom is a 69 year old female with a history of rectal CA, AVDz, asthma, allergy, GERD, TBM, s/p pneumonia, who presents still symptomatic , s/p viral illness with asthmatic bronchitis.\par \par Her chronic SOB which is multifactorial due to:\par - tracheomalacia (s/p tracheoplasty with residual frequent mucus production, though patient is non-compliant with vest therapy)\par - chronic bronchitis\par - asthma\par - allergic rhinitis\par - GERD\par - poor breathing mechanics \par - CAD/AV disease\par \par \par problem 1a: severe persistent asthma -(steroid dependent) - active\par - continue Medrol 24 mg QAM and 16 mg QPM for 1 week\par -continue to use albuterol via the nebulizer QID \par -followed by Mucomyst QiD\par -followed by the acapella device/ chest vest therapy \par -followed by Perforomist via the nebulizer BID\par -followed by Budesonide 0.5% via the nebulizer BID \par -continue to use Breo Ellipta 200 at 1 inhalation QD \par -continue to use Spiriva 1 inhalation QD\par -failed Xolair 225 injection; follow up injections every 2 weeks - Dupixent initiated (12.3.19) - to continue \par -continue to use Accolate 20 mg BID\par -Information sheet given about prednisone to the patient to be reviewed, this medication is never to be used without consulting the prescribing physician. Proper dietary restraint is necessary specifically salt containing foods, if any reaction may occur should be reported. \par -Asthma is believed to be caused by inherited (genetic) and environmental factor, but its exact cause is unknown. Asthma may be triggered by allergens, lung infections, or irritants in the air. Asthma triggers are different for each person\par -Inhaler technique reviewed as well as oral hygiene techniques reviewed with patient. Avoidance of cold air, extremes of temperature, rescue inhaler should be used before exercise. Order of medication reviewed with patient. Recommended use of a cool mist humidifier in the bedroom. \par \par problem 2: tracheomalacia, residual bronchomalacia \par -s/p tracheoplasty with Dr. Mt Zapien\par -continue to follow up \par -get f/u Dynamic chest CT 10/2019\par \par problem 3: chronic bronchitis and mucus clearance\par -continue to use acapella device multiple times daily\par -recommended to use chest vest therapy multiple times daily \par -she is being sent for sputum cultures \par Patient has a chronic cough greater than 6 months, tried and failed manual chest physiotherapy at home, no skilled caregiver available at home to perform manual CPT, tried and failed acapella vibratory physiotherapy, and recommended chest vest therapy \par \par Problem 3A: Multiple infections\par -Continue Tobramycin BID for 1 month (completing 12/20/19)\par -Complete follow up Sputum culture after completing ABx\par \par problem 4: GERD\par -continue to use Protonix 40 mg before breakfast\par -continue Baclofen 5 mg pre-meal and QHS\par -Rule of 2s: avoid eating too much, eating too late, eating too spicy, eating two hours before bed\par -Things to avoid including overeating, spicy foods, tight clothing, eating within three hours of bed, this list is not all inclusive. \par -For treatment of reflux, possible options discussed including diet control, H2 blockers, PPIs, as well as coating motility agents discussed as treatment options. Timing of meals and proximity of last meal to sleep were discussed. If symptoms persist, a formal gastrointestinal evaluation is needed. \par \par problem 5: allergic rhinitis \par -continue to use nasal saline\par -continue to use Xyzal 5 mg before bed\par -Environmental measures for allergies were encouraged including mattress and pillow cover, air purifier, and environmental controls. \par \par problem 6: hx of abnormal aortic valve / cardiac health\par -continue to follow up with Dr. Leahy, AV Disease, AF\par -echocadiogram in June 2018  (Tosin)\par \par problem 7: colon cancer\par -s/p radiation therapy, surgery \par -continue to use chemotherapy follow up with Dr. King or Dr. Mario\par \par problem 8: poor breathing mechanics\par -Proper breathing techniques were reviewed with an emphasis of exhalation. Patient instructed to breath in for 1 second and out for four seconds. Patient was encouraged to not talk while walking.\par \par problem 9: immunodeficiency\par - -Due to the fact that this pt has had more infections than would be expected and immunological blood work is indicated this would include: IgG subclasses, quantitative immunoglobulins, Strep pneumoniae titers as well as Vitamin D levels. Based on this blood work we will be able to decide where the pt needs additional pneumococcal vaccine either Prevnar 13 or pneumovax. Immunology evaluation will also be potentially indicated.\par \par problem 10: r/o immunodeficiency (on Medrol)\par -Due to the fact that this pt has had more infections than would be expected and immunological blood work is indicated this would include: IgG subclasses, quantitative immunoglobulins, Strep pneumoniae titers as well as Vitamin D levels.\par -Based on this blood work we will be able to decide where the pt needs additional pneumococcal vaccine either Prevnar 13 or pneumovax. Immunology evaluation will also be potentially indicated. \par \par problem 11: abnormal chest CT- ? new nodule- likely inflammation \par - F/u PET/CT 4/2019- if changed Bx (Rupali); follow up and re-evaluate as per Rupali\par -questionable DIEUDONNE or HERMELINDO, all sputum is negative \par -CAT scans are the only radiological modality to identify abnormalities w/in the lungs with regards to nodules/masses/lymph nodes. Risks, benefits were reviewed in detail. The guidelines for abnormalities include follow up CT scans at various intervals which could range from 6 weeks to 1 year intervals. If there is a change for the worse then consideration for a biopsy will be considered if you are a candidate. Second opinion evaluation with a thoracic surgeon or an interventional radiologist could be offered. 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 It sometimes develops following a URI, herpes zoster outbreak in pharynx or thyroid or cervical spine injury. However, many patients have no identifiable antecedent explanation. \par \par problem 13:  health maintenance \par -recommended yearly flu shot after October 15 (2019)\par -recommended strep pneumonia vaccines: Prevnar-13 vaccine, followed by Pneumo vaccine 23 one year following\par -recommended early intervention for URIs\par -recommended regular osteoporosis evaluations\par -recommended early dermatological evaluations\par -recommended after the age of 50 to the age of 70, colonoscopy every 5 years \par \par \par F/U in 6 weeks - SPI / NIOX\par She is encouraged to call with any changes, concerns, or questions.

## 2019-12-18 NOTE — PROCEDURE
[FreeTextEntry1] : 6 minute walk test reveals a low saturation of 96% with very slight dyspnea; walked 293.4 meters

## 2019-12-18 NOTE — PHYSICAL EXAM
[General Appearance - Well Developed] : well developed [Normal Appearance] : normal appearance [Well Groomed] : well groomed [General Appearance - In No Acute Distress] : no acute distress [No Deformities] : no deformities [General Appearance - Well Nourished] : well nourished [Eyelids - No Xanthelasma] : the eyelids demonstrated no xanthelasmas [Normal Conjunctiva] : the conjunctiva exhibited no abnormalities [Normal Oropharynx] : normal oropharynx [II] : II [Jugular Venous Distention Increased] : there was no jugular-venous distention [Neck Cervical Mass (___cm)] : no neck mass was observed [Neck Appearance] : the appearance of the neck was normal [Heart Sounds] : normal S1 and S2 [Thyroid Nodule] : there were no palpable thyroid nodules [Heart Rate And Rhythm] : heart rate and rhythm were normal [Thyroid Diffuse Enlargement] : the thyroid was not enlarged [Respiration, Rhythm And Depth] : normal respiratory rhythm and effort [Exaggerated Use Of Accessory Muscles For Inspiration] : no accessory muscle use [Kyphosis] : kyphosis [Abdomen Tenderness] : non-tender [Abdomen Soft] : soft [Abnormal Walk] : normal gait [Gait - Sufficient For Exercise Testing] : the gait was sufficient for exercise testing [Abdomen Mass (___ Cm)] : no abdominal mass palpated [Cyanosis, Localized] : no localized cyanosis [Nail Clubbing] : no clubbing of the fingernails [Petechial Hemorrhages (___cm)] : no petechial hemorrhages [Skin Color & Pigmentation] : normal skin color and pigmentation [] : no rash [Skin Lesions] : no skin lesions [No Venous Stasis] : no venous stasis [No Xanthoma] : no  xanthoma was observed [No Skin Ulcers] : no skin ulcer [Deep Tendon Reflexes (DTR)] : deep tendon reflexes were 2+ and symmetric [Oriented To Time, Place, And Person] : oriented to person, place, and time [Sensation] : the sensory exam was normal to light touch and pinprick [No Focal Deficits] : no focal deficits [Impaired Insight] : insight and judgment were intact [Affect] : the affect was normal [FreeTextEntry1] : 2/6 systolic murmur

## 2019-12-18 NOTE — ADDENDUM
[FreeTextEntry1] : Documented by Dejuan Cope acting as a scribe for Dr. Eulalio Allison on 12/18/2019.\par \par All medical record entries made by the Scribe were at my, Dr. Eulalio Allison's, direction and personally dictated by me on 12/18/2019. I have reviewed the chart and agree that the record accurately reflects my personal performance of the history, physical exam, assessment and plan. I have also personally directed, reviewed, and agree with the discharge instructions.

## 2019-12-24 ENCOUNTER — APPOINTMENT (OUTPATIENT)
Dept: PEDIATRIC ALLERGY IMMUNOLOGY | Facility: CLINIC | Age: 69
End: 2019-12-24
Payer: MEDICARE

## 2019-12-24 VITALS
BODY MASS INDEX: 21.5 KG/M2 | HEART RATE: 74 BPM | DIASTOLIC BLOOD PRESSURE: 78 MMHG | OXYGEN SATURATION: 96 % | SYSTOLIC BLOOD PRESSURE: 136 MMHG | HEIGHT: 67 IN | WEIGHT: 137 LBS

## 2019-12-24 DIAGNOSIS — T50.905D ADVERSE EFFECT OF UNSPECIFIED DRUGS, MEDICAMENTS AND BIOLOGICAL SUBSTANCES, SUBSEQUENT ENCOUNTER: ICD-10-CM

## 2019-12-24 DIAGNOSIS — Z86.19 PERSONAL HISTORY OF OTHER INFECTIOUS AND PARASITIC DISEASES: ICD-10-CM

## 2019-12-24 DIAGNOSIS — Z13.228 ENCOUNTER FOR SCREENING FOR OTHER SUSPECTED ENDOCRINE DISORDER: ICD-10-CM

## 2019-12-24 DIAGNOSIS — Z13.0 ENCOUNTER FOR SCREENING FOR OTHER SUSPECTED ENDOCRINE DISORDER: ICD-10-CM

## 2019-12-24 DIAGNOSIS — Z13.29 ENCOUNTER FOR SCREENING FOR OTHER SUSPECTED ENDOCRINE DISORDER: ICD-10-CM

## 2019-12-24 DIAGNOSIS — Z88.0 ALLERGY STATUS TO PENICILLIN: ICD-10-CM

## 2019-12-24 PROCEDURE — 99215 OFFICE O/P EST HI 40 MIN: CPT

## 2019-12-24 NOTE — HISTORY OF PRESENT ILLNESS
[de-identified] : 69 y.o F with multiple medical issues including anal CA s/p resection and chemoradiation (last treatment was at least 1 yr ago). She also has abnormal lung imaging, bronchiectasis, GERD, COPD s/p tracheoplasty, tracheobronchomalacia, severe persistent asthma (on chronic steroids). She originally saw me in 2017 for immune evaluation, but she was recently diagnosed with anal CA at that time. She also has multiple adverse reactions to medications.\par  \par Her immune evaluation up to that point showed:\par cellular immune eval:\par unremarkable T, B, NK cell counts\par normal mitogen and antigen proliferation\par negative HIV serologies\par \par humoral immune eval:\par elevated IgA with otherwise\par non protective titers to pneumococcus (despite Pneumovax within past 5 yrs), diphtheria, Hib, mumps\par protective titers to measles, rubella\par low but protective titers to tetanus\par \par complement danay:\par unremarkable CH50, AH50, MBL\par \par phagocyte eval:\par normal G6PD, myeloperoxidase stain, DHR assay\par \par ABPA eval:\par negative aspergillus precipitins\par negative aspergillus IgE\par \par \par \par negative genetic testing for CF\par \par After initial evaluation, she started treatment for anal CA. In 4/2017, we she protective titers to Hib and pneumococcus indicating that she responded appropriately to vaccination. She was lost to follow up while she concentrated on her other medical conditions, namely anal CA.\par \par She returns to day since she still continues to have infections. Earlier this months he was hospitalized for influenza.  She also reports that she has a lesion in the right lung that needs further evaluation but her surgeons are hesitant to biopsy right now because they "don’t want to inadvertently spread the lesion." She continues to have dyspnea on exertion. Dr. Allison recently increased her steroid dose - She is currently on  Methylprednisone 24mg and 16mg for difficulty breathing (up from 4mg daily). She has made adjustments to her insulin regimen as a result. She is also starting to have productive cough. Sputum now is changing to whitish/yellowish but baseline is more clear. She denies any fevers currently. She is also complaining of yeast infection starting 12/22/19 and is asking for po fluconazole since this usually helps her.\par with regards to adverse drug reactions, she continues to avoid ASA, codeine, Dilaudid, OxyContin, Percocet, beta lactams, Valium. She has not had any accidental exposures to these.

## 2019-12-24 NOTE — CONSULT LETTER
[Dear  ___] : Dear  [unfilled], [Please see my note below.] : Please see my note below. [Courtesy Letter:] : I had the pleasure of seeing your patient, [unfilled], in my office today. [Sincerely,] : Sincerely, [DrShreya  ___] : Dr. MANZO [FreeTextEntry3] : Garrett Romero III  MPH, MD, PhD, FACP, FACAAI, FAAAAI \par , Departments of Medicine and Pediatrics \par [employer] [facility] [employer] [facility] \par , [facility] [employer] [facility] [employer] [facility] \par Attending Physician, Division of Allergy & Immunology [facility]\par

## 2019-12-24 NOTE — PHYSICAL EXAM
[Alert] : alert [Well Nourished] : well nourished [Healthy Appearance] : healthy appearance [Well Developed] : well developed [No Acute Distress] : no acute distress [No Discharge] : no discharge [Normal Pupil & Iris Size/Symmetry] : normal pupil and iris size and symmetry [No Photophobia] : no photophobia [Sclera Not Icteric] : sclera not icteric [Normal TMs] : both tympanic membranes were normal [Normal Lips/Tongue] : the lips and tongue were normal [Normal Nasal Mucosa] : the nasal mucosa was normal [Normal Outer Ear/Nose] : the ears and nose were normal in appearance [Normal Tonsils] : normal tonsils [No Thrush] : no thrush [Normal Dentition] : normal dentition [No Oral Lesions or Ulcers] : no oral lesions or ulcers [No LAD] : no lymphadenopathy [Supple] : the neck was supple [Normal Rate and Effort] : normal respiratory rhythm and effort [Normal Palpation] : palpation of the chest revealed no abnormalities [No Retractions] : no retractions [No Crackles] : no crackles [Bilateral Audible Breath Sounds] : bilateral audible breath sounds [Normal Rate] : heart rate was normal  [Normal S1, S2] : normal S1 and S2 [No murmur] : no murmur [Regular Rhythm] : with a regular rhythm [Soft] : abdomen soft [No HSM] : no hepato-splenomegaly [Not Tender] : non-tender [Not Distended] : not distended [Normal Axillary Lumph Nodes] : axillary [Normal Cervical Lymph Nodes] : cervical [No Rash] : no rash [Skin Intact] : skin intact  [No Skin Lesions] : no skin lesions [No clubbing] : no clubbing [No Edema] : no edema [No Joint Swelling or Erythema] : no joint swelling or erythema [No Cyanosis] : no cyanosis [No Motor Deficits] : the motor exam was normal [Normal Affect] : affect was normal [Normal Mood] : mood was normal [Alert, Awake, Oriented as Age-Appropriate] : alert, awake, oriented as age appropriate [Conjunctival Erythema] : no conjunctival erythema [Suborbital Bogginess] : no suborbital bogginess (allergic shiners) [Boggy Nasal Turbinates] : no boggy and/or pale nasal turbinates [Pharyngeal erythema] : no pharyngeal erythema [Exudate] : no exudate [Posterior Pharyngeal Cobblestoning] : no posterior pharyngeal cobblestoning [Clear Rhinorrhea] : no clear rhinorrhea was seen [Wheezing] : no wheezing was heard [Xerosis] : no xerosis [Eczematous Patches] : no eczematous patches

## 2019-12-24 NOTE — REVIEW OF SYSTEMS
[Eye Redness] : no redness [Eye Itching] : no itchy eyes [Rhinorrhea] : no rhinorrhea [Nasal Congestion] : no nasal congestion [SOB with Exertion] : dyspnea on exertion [Nocturnal Awakening] : no nocturnal awakening with shortness of breath [Sputum Production] : coughing up sputum [Wheezing Worsens With Exercise] : wheezing does not worsen with exercise [Wheezing] : no wheezing [Nl] : Genitourinary [de-identified] : see hpi

## 2019-12-28 LAB
CULTURE RESULTS: SIGNIFICANT CHANGE UP
SPECIMEN SOURCE: SIGNIFICANT CHANGE UP

## 2019-12-30 ENCOUNTER — RX RENEWAL (OUTPATIENT)
Age: 69
End: 2019-12-30

## 2019-12-31 ENCOUNTER — APPOINTMENT (OUTPATIENT)
Dept: HEART AND VASCULAR | Facility: CLINIC | Age: 69
End: 2019-12-31
Payer: MEDICARE

## 2019-12-31 VITALS
TEMPERATURE: 97.5 F | DIASTOLIC BLOOD PRESSURE: 62 MMHG | OXYGEN SATURATION: 95 % | HEIGHT: 67 IN | SYSTOLIC BLOOD PRESSURE: 160 MMHG | WEIGHT: 134 LBS | BODY MASS INDEX: 21.03 KG/M2 | HEART RATE: 84 BPM | RESPIRATION RATE: 16 BRPM

## 2019-12-31 DIAGNOSIS — L02.229 FURUNCLE OF TRUNK, UNSPECIFIED: ICD-10-CM

## 2019-12-31 DIAGNOSIS — M62.838 OTHER MUSCLE SPASM: ICD-10-CM

## 2019-12-31 PROCEDURE — 10060 I&D ABSCESS SIMPLE/SINGLE: CPT

## 2019-12-31 PROCEDURE — 99214 OFFICE O/P EST MOD 30 MIN: CPT | Mod: 25

## 2020-01-01 NOTE — PROGRESS NOTE ADULT - REASON FOR ADMISSION
fever, increased sputum production, dysuria
fever, dysuria
fever, increased sputum production, dysuria
Initial

## 2020-01-02 LAB
ALBUMIN SERPL ELPH-MCNC: 3.8 G/DL
ALP BLD-CCNC: 83 U/L
ALT SERPL-CCNC: 24 U/L
ANION GAP SERPL CALC-SCNC: 17 MMOL/L
AST SERPL-CCNC: 16 U/L
BACTERIA SPT CULT: ABNORMAL
BILIRUB SERPL-MCNC: 0.2 MG/DL
BUN SERPL-MCNC: 23 MG/DL
CALCIUM SERPL-MCNC: 9.1 MG/DL
CHLORIDE SERPL-SCNC: 97 MMOL/L
CO2 SERPL-SCNC: 22 MMOL/L
CREAT SERPL-MCNC: 0.72 MG/DL
MAGNESIUM SERPL-MCNC: 2 MG/DL
POTASSIUM SERPL-SCNC: 4.8 MMOL/L
PROT SERPL-MCNC: 6.1 G/DL
SODIUM SERPL-SCNC: 136 MMOL/L

## 2020-01-03 ENCOUNTER — OUTPATIENT (OUTPATIENT)
Dept: OUTPATIENT SERVICES | Facility: HOSPITAL | Age: 70
LOS: 1 days | End: 2020-01-03
Payer: MEDICARE

## 2020-01-03 ENCOUNTER — APPOINTMENT (OUTPATIENT)
Dept: MRI IMAGING | Facility: IMAGING CENTER | Age: 70
End: 2020-01-03
Payer: MEDICARE

## 2020-01-03 DIAGNOSIS — K62.5 HEMORRHAGE OF ANUS AND RECTUM: Chronic | ICD-10-CM

## 2020-01-03 DIAGNOSIS — Z00.8 ENCOUNTER FOR OTHER GENERAL EXAMINATION: ICD-10-CM

## 2020-01-03 DIAGNOSIS — Z98.89 OTHER SPECIFIED POSTPROCEDURAL STATES: Chronic | ICD-10-CM

## 2020-01-03 DIAGNOSIS — Z87.09 PERSONAL HISTORY OF OTHER DISEASES OF THE RESPIRATORY SYSTEM: Chronic | ICD-10-CM

## 2020-01-03 DIAGNOSIS — Z98.890 OTHER SPECIFIED POSTPROCEDURAL STATES: Chronic | ICD-10-CM

## 2020-01-03 DIAGNOSIS — Z96.653 PRESENCE OF ARTIFICIAL KNEE JOINT, BILATERAL: Chronic | ICD-10-CM

## 2020-01-03 DIAGNOSIS — H05.352 EXOSTOSIS OF LEFT ORBIT: Chronic | ICD-10-CM

## 2020-01-03 DIAGNOSIS — R74.8 ABNORMAL LEVELS OF OTHER SERUM ENZYMES: ICD-10-CM

## 2020-01-03 LAB
BASOPHILS # BLD AUTO: 0.04 K/UL
BASOPHILS NFR BLD AUTO: 0.2 %
CD16+CD56+ CELLS # BLD: 122 /UL
CD16+CD56+ CELLS NFR BLD: 17 %
CD19 CELLS NFR BLD: 229 /UL
CD3 CELLS # BLD: 352 /UL
CD3 CELLS NFR BLD: 49 %
CD3+CD4+ CELLS # BLD: 222 /UL
CD3+CD4+ CELLS NFR BLD: 31 %
CD3+CD4+ CELLS/CD3+CD8+ CLL SPEC: 2.02 RATIO
CD3+CD8+ CELLS # SPEC: 110 /UL
CD3+CD8+ CELLS NFR BLD: 16 %
CELLS.CD3-CD19+/CELLS IN BLOOD: 32 %
DEPRECATED KAPPA LC FREE/LAMBDA SER: 1.37 RATIO
EOSINOPHIL # BLD AUTO: 0.02 K/UL
EOSINOPHIL NFR BLD AUTO: 0.1 %
HCT VFR BLD CALC: 44.8 %
HGB BLD-MCNC: 13.6 G/DL
IGA SER QL IEP: 558 MG/DL
IGG SER QL IEP: 526 MG/DL
IGM SER QL IEP: 105 MG/DL
IMM GRANULOCYTES NFR BLD AUTO: 4 %
KAPPA LC CSF-MCNC: 0.94 MG/DL
KAPPA LC SERPL-MCNC: 1.29 MG/DL
LYMPHOCYTES # BLD AUTO: 0.88 K/UL
LYMPHOCYTES NFR BLD AUTO: 5.1 %
MAN DIFF?: NORMAL
MCHC RBC-ENTMCNC: 23.3 PG
MCHC RBC-ENTMCNC: 30.4 GM/DL
MCV RBC AUTO: 76.7 FL
MEV IGG FLD QL IA: >300 AU/ML
MEV IGG+IGM SER-IMP: POSITIVE
MONOCYTES # BLD AUTO: 0.84 K/UL
MONOCYTES NFR BLD AUTO: 4.9 %
MUV AB SER-ACNC: NEGATIVE
MUV IGG SER QL IA: <5 AU/ML
NEUTROPHILS # BLD AUTO: 14.67 K/UL
NEUTROPHILS NFR BLD AUTO: 85.7 %
PLATELET # BLD AUTO: 238 K/UL
RBC # BLD: 5.84 M/UL
RBC # FLD: 17.7 %
RUBV IGG FLD-ACNC: 31 INDEX
RUBV IGG SER-IMP: POSITIVE
VZV AB TITR SER: POSITIVE
VZV IGG SER IF-ACNC: 582.7 INDEX
WBC # FLD AUTO: 17.13 K/UL

## 2020-01-03 PROCEDURE — 72141 MRI NECK SPINE W/O DYE: CPT

## 2020-01-03 PROCEDURE — 72141 MRI NECK SPINE W/O DYE: CPT | Mod: 26

## 2020-01-05 NOTE — REVIEW OF SYSTEMS
[Feeling Fatigued] : feeling fatigued [Recent Weight Loss (___ Lbs)] : recent [unfilled] ~Ulb weight loss [Dyspnea on exertion] : dyspnea during exertion [Cough] : cough [Negative] : Heme/Lymph [Recent Weight Gain (___ Lbs)] : no recent weight gain [Wheezing] : no wheezing [Coughing Up Blood] : no hemoptysis

## 2020-01-05 NOTE — PHYSICAL EXAM
[General Appearance - Well Developed] : well developed [Normal Appearance] : normal appearance [Well Groomed] : well groomed [No Deformities] : no deformities [General Appearance - Well Nourished] : well nourished [General Appearance - In No Acute Distress] : no acute distress [Normal Conjunctiva] : the conjunctiva exhibited no abnormalities [Eyelids - No Xanthelasma] : the eyelids demonstrated no xanthelasmas [Normal Oral Mucosa] : normal oral mucosa [No Oral Cyanosis] : no oral cyanosis [No Oral Pallor] : no oral pallor [Normal Jugular Venous A Waves Present] : normal jugular venous A waves present [Normal Jugular Venous V Waves Present] : normal jugular venous V waves present [No Jugular Venous Espino A Waves] : no jugular venous espino A waves [Exaggerated Use Of Accessory Muscles For Inspiration] : no accessory muscle use [Respiration, Rhythm And Depth] : normal respiratory rhythm and effort [Heart Rate And Rhythm] : heart rate and rhythm were normal [Heart Sounds] : normal S1 and S2 [Edema] : no peripheral edema present [Systolic grade ___/6] : A grade [unfilled]/6 systolic murmur was heard. [Bowel Sounds] : normal bowel sounds [Abdomen Tenderness] : non-tender [Abnormal Walk] : normal gait [Gait - Sufficient For Exercise Testing] : the gait was sufficient for exercise testing [Nail Clubbing] : no clubbing of the fingernails [Cyanosis, Localized] : no localized cyanosis [Petechial Hemorrhages (___cm)] : no petechial hemorrhages [Nail Splinter Hemorrhages] : no splinter hemorrhages of the nails [Skin Color & Pigmentation] : normal skin color and pigmentation [Fingers Osler's Nodes] : Osler's nodes were not seenon the fingers [] : no rash [No Venous Stasis] : no venous stasis [Skin Lesions] : no skin lesions [No Skin Ulcers] : no skin ulcer [No Xanthoma] : no  xanthoma was observed [Oriented To Time, Place, And Person] : oriented to person, place, and time [Impaired Insight] : insight and judgment were intact [Affect] : the affect was normal [Mood] : the mood was normal [Memory Recent] : recent memory was not impaired [Memory Remote] : remote memory was not impaired [FreeTextEntry1] : mildly decreased skin turgor

## 2020-01-05 NOTE — ASSESSMENT
[FreeTextEntry1] : BOIL OF RIGHT SIDE ABDOMEN WITH SURROUNDING ERYTHEMA, TENDERNESS AND PUS DISCHARGE\par \par TYPE 2 DIABETES \par \par CHRONIC BRONCHIECTASIS \par \par

## 2020-01-05 NOTE — DISCUSSION/SUMMARY
[FreeTextEntry1] : BOIL RIGHT SIDE OF ABDOMEN LANCED AND  CULTURED AND DRAINED. DRESSED WITH BACITRACIN AND STERILE BANDAGE \par \par IMMUNOCOMPROMISED ON PREDNISONE  AND HX OF  RECTAL CANCER \par \par SPUTUM CULTURE SENT OFF \par \par WILL ADVISE BASED ON RESULTS OF CULTURES. \par \par INCREASE HYDRATION

## 2020-01-05 NOTE — HISTORY OF PRESENT ILLNESS
[FreeTextEntry1] : Recent admission for worsening bronchial infection  exacerbated by flu .  Swab for flu was positive.  Lost ~ 4 lbs   Now c/o muscle spasms in legs and arms . No fevers, chills,  \par \par Has small  (0.5 cm) boil on right side of abdomen with discharge of pus , surrounding erythema and tenderness \par \par Currently off all antibiotics\par \par Blood sugars high despite Lantus increase to 16 units  and likely secondary to steroid use

## 2020-01-05 NOTE — REASON FOR VISIT
[Follow-Up - Clinic] : a clinic follow-up of [Dyspnea] : dyspnea [FreeTextEntry1] : 68 yo female with hx of seizure ,  hx of rectal cancer s/p recent completion of radiation and chemotherapy and  + PET scan showing residual disease  which necessitated  surgery in July 2018 . Residual rectal cancer removed with  all 14 lymph nodes negative for disease. Ostomy bag in place. Brain MRI was negative for metastatic disease. \par \par Chronic asthma with respiratory infections and tracheomalacia that was surgically repaired last year . Has nonvalvular afib and is on Eliquis   without bleeding. \par \par There is  hx of moderate severity  chronic aortic regurgitation. \par \par

## 2020-01-06 LAB
BACTERIA SPEC CULT: ABNORMAL
C DIPHTHERIAE AB SER QL: 0.15 IU/ML
C TETANI IGG SER-ACNC: 0.14 IU/ML
G6PD SER-CCNC: 16.1 U/G HGB

## 2020-01-07 ENCOUNTER — MEDICATION RENEWAL (OUTPATIENT)
Age: 70
End: 2020-01-07

## 2020-01-07 LAB
ALBUMIN MFR SERPL ELPH: 56.1 %
ALBUMIN SERPL-MCNC: 3.4 G/DL
ALBUMIN/GLOB SERPL: 1.3 RATIO
ALPHA1 GLOB MFR SERPL ELPH: 4.9 %
ALPHA1 GLOB SERPL ELPH-MCNC: 0.3 G/DL
ALPHA2 GLOB MFR SERPL ELPH: 11.5 %
ALPHA2 GLOB SERPL ELPH-MCNC: 0.7 G/DL
B-GLOBULIN MFR SERPL ELPH: 18.2 %
B-GLOBULIN SERPL ELPH-MCNC: 1.1 G/DL
CH50 SERPL-MCNC: 80 U/ML
GAMMA GLOB FLD ELPH-MCNC: 0.6 G/DL
GAMMA GLOB MFR SERPL ELPH: 9.3 %
INTERPRETATION SERPL IEP-IMP: NORMAL
M PROTEIN MFR SERPL ELPH: 1.2 %
M PROTEIN SPEC IFE-MCNC: NORMAL
MANNAN BINDING LECTIN (MBL): <70 NG/ML
MONOCLON BAND OBS SERPL: 0.1 G/DL
PROT SERPL-MCNC: 6.1 G/DL

## 2020-01-13 LAB
COMPLEMENT, ALTERNATE PATHWAY (AH50): 62
DEPRECATED S PNEUM 1 IGG SER-MCNC: 18 MCG/ML
DEPRECATED S PNEUM12 AB SER-ACNC: <0.4 MCG/ML
DEPRECATED S PNEUM14 AB SER-ACNC: 7.6 MCG/ML
DEPRECATED S PNEUM17 IGG SER IA-MCNC: 0.7 MCG/ML
DEPRECATED S PNEUM18 IGG SER IA-MCNC: 0.4 MCG/ML
DEPRECATED S PNEUM19 IGG SER-MCNC: 1.9 MCG/ML
DEPRECATED S PNEUM19 IGG SER-MCNC: 23.8 MCG/ML
DEPRECATED S PNEUM2 IGG SER-MCNC: <0.4 MCG/ML
DEPRECATED S PNEUM20 IGG SER-MCNC: <0.4 MCG/ML
DEPRECATED S PNEUM22 IGG SER-MCNC: 3.4 MCG/ML
DEPRECATED S PNEUM23 AB SER-ACNC: 4.2 MCG/ML
DEPRECATED S PNEUM3 AB SER-ACNC: <0.4 MCG/ML
DEPRECATED S PNEUM34 IGG SER-MCNC: 0.9 MCG/ML
DEPRECATED S PNEUM4 AB SER-ACNC: <0.4 MCG/ML
DEPRECATED S PNEUM5 IGG SER-MCNC: 36.7 MCG/ML
DEPRECATED S PNEUM6 IGG SER-MCNC: 2.1 MCG/ML
DEPRECATED S PNEUM7 IGG SER-ACNC: 0.8 MCG/ML
DEPRECATED S PNEUM8 AB SER-ACNC: 2.6 MCG/ML
DEPRECATED S PNEUM9 AB SER-ACNC: <0.4 MCG/ML
DEPRECATED S PNEUM9 IGG SER-MCNC: <0.4 MCG/ML
HAEM INFLU B AB SER-MCNC: 0.11 MG/L
LPT PW BLD-NRATE: ABNORMAL
LPT PW BLD-NRATE: ABNORMAL
NBT BLD QL: ABNORMAL
STREPTOCOCCUS PNEUMONIAE SEROTYPE 11A: 3.7 MCG/ML
STREPTOCOCCUS PNEUMONIAE SEROTYPE 15B: 0.5 MCG/ML
STREPTOCOCCUS PNEUMONIAE SEROTYPE 33F: <0.4 MCG/ML

## 2020-01-14 ENCOUNTER — APPOINTMENT (OUTPATIENT)
Dept: NEUROLOGY | Facility: CLINIC | Age: 70
End: 2020-01-14
Payer: MEDICARE

## 2020-01-14 DIAGNOSIS — M54.16 RADICULOPATHY, LUMBAR REGION: ICD-10-CM

## 2020-01-14 PROCEDURE — 99214 OFFICE O/P EST MOD 30 MIN: CPT

## 2020-01-14 NOTE — HISTORY OF PRESENT ILLNESS
[FreeTextEntry1] : This is a 69-year-old woman history of cervical radiculopathy diabetes mellitus paresthesias in the lower extremities she's had a history of rectal colon cancer and apparently has lesions in her lung that are undetermined whether infectious or metastatic. She was recently told she might have multiple myeloma. She said that her legs are somewhat weak it's discomforting. She has no bladder symptoms. She denies any pain.

## 2020-01-14 NOTE — PHYSICAL EXAM
[FreeTextEntry1] : On examination she is awake alert and 3 appropriately without aphasia or dysarthria. Cranial dorsiflexion of the movements fundi benign is no facial asymmetry lower cranial nerves are normal. Her gait appears to be slightly unsteady without foot drop she has absent ankle jerks trace knee jerks toes are downgoing position vibratory sense and diminished in both lower extremities no color temperature or skin changes noted without bruits no upper extremity or cranial nerve deficits could be appreciated.

## 2020-01-14 NOTE — ASSESSMENT
[FreeTextEntry1] : Is a 69-year-old woman with history of diabetes mellitus possible multiple myeloma myeloma she's had a history of colorectal cancer as lesions in her lung and as yet undetermined. She has discomfort involving her lower extremities with weakness some unsteadiness the family swelling or temperature changes. At this time and remains to determine whether she has peripheral neuropathy or radiculopathy she will have her MRI of the cervical spine forwarded to me she'll have a new MRI of her lumbar spine EMG studies rehabilitation evaluation and I will see her for followup.

## 2020-01-15 ENCOUNTER — APPOINTMENT (OUTPATIENT)
Dept: PULMONOLOGY | Facility: CLINIC | Age: 70
End: 2020-01-15
Payer: MEDICARE

## 2020-01-15 VITALS
RESPIRATION RATE: 17 BRPM | BODY MASS INDEX: 21.35 KG/M2 | DIASTOLIC BLOOD PRESSURE: 70 MMHG | WEIGHT: 136 LBS | OXYGEN SATURATION: 98 % | SYSTOLIC BLOOD PRESSURE: 130 MMHG | HEART RATE: 89 BPM | HEIGHT: 67 IN

## 2020-01-15 PROCEDURE — 99214 OFFICE O/P EST MOD 30 MIN: CPT | Mod: 25

## 2020-01-15 RX ORDER — CIPROFLOXACIN HYDROCHLORIDE 500 MG/1
500 TABLET, FILM COATED ORAL TWICE DAILY
Qty: 28 | Refills: 1 | Status: DISCONTINUED | COMMUNITY
Start: 2019-11-27 | End: 2020-01-15

## 2020-01-15 NOTE — PHYSICAL EXAM
[General Appearance - Well Developed] : well developed [Normal Appearance] : normal appearance [General Appearance - Well Nourished] : well nourished [Well Groomed] : well groomed [No Deformities] : no deformities [General Appearance - In No Acute Distress] : no acute distress [Normal Conjunctiva] : the conjunctiva exhibited no abnormalities [Eyelids - No Xanthelasma] : the eyelids demonstrated no xanthelasmas [Normal Oropharynx] : normal oropharynx [II] : II [Neck Cervical Mass (___cm)] : no neck mass was observed [Neck Appearance] : the appearance of the neck was normal [Jugular Venous Distention Increased] : there was no jugular-venous distention [Thyroid Nodule] : there were no palpable thyroid nodules [Thyroid Diffuse Enlargement] : the thyroid was not enlarged [Heart Sounds] : normal S1 and S2 [Heart Rate And Rhythm] : heart rate and rhythm were normal [Exaggerated Use Of Accessory Muscles For Inspiration] : no accessory muscle use [Respiration, Rhythm And Depth] : normal respiratory rhythm and effort [Kyphosis] : kyphosis [Abdomen Soft] : soft [Abdomen Tenderness] : non-tender [Abdomen Mass (___ Cm)] : no abdominal mass palpated [Nail Clubbing] : no clubbing of the fingernails [Gait - Sufficient For Exercise Testing] : the gait was sufficient for exercise testing [Cyanosis, Localized] : no localized cyanosis [Abnormal Walk] : normal gait [Petechial Hemorrhages (___cm)] : no petechial hemorrhages [No Venous Stasis] : no venous stasis [Skin Color & Pigmentation] : normal skin color and pigmentation [] : no rash [Skin Lesions] : no skin lesions [No Xanthoma] : no  xanthoma was observed [No Skin Ulcers] : no skin ulcer [Sensation] : the sensory exam was normal to light touch and pinprick [Deep Tendon Reflexes (DTR)] : deep tendon reflexes were 2+ and symmetric [Impaired Insight] : insight and judgment were intact [Oriented To Time, Place, And Person] : oriented to person, place, and time [No Focal Deficits] : no focal deficits [Affect] : the affect was normal [FreeTextEntry1] : I:E ratio 1:3, expiratory wheeze b/l no rhonchi at the moment

## 2020-01-15 NOTE — ASSESSMENT
[FreeTextEntry1] : Ms. Bloom is a 69 year old female with a history of rectal CA, AVDz, asthma, allergy, GERD, TBM, s/p pneumonia, who presents recent mm -awaiting Dx/Rx \par \par Her chronic SOB which is multifactorial due to:\par - tracheomalacia (s/p tracheoplasty with residual frequent mucus production, though patient is non-compliant with vest therapy)\par - chronic bronchitis\par - asthma\par - allergic rhinitis\par - GERD\par - poor breathing mechanics \par - CAD/AV disease\par \par \par problem 1a: severe persistent asthma -(steroid dependent) - active\par - continue Medrol 12 mg QAM and 12 mg QPM for 1 week\par -continue to use albuterol via the nebulizer QID \par -followed by Mucomyst QiD\par -followed by the acapella device/ chest vest therapy \par -followed by Perforomist via the nebulizer BID\par -followed by Budesonide 0.5% via the nebulizer BID \par -continue to use Breo Ellipta 200 at 1 inhalation QD \par -continue to use Spiriva 1 inhalation QD\par -failed Xolair 225 injection; follow up injections every 2 weeks - Dupixent initiated (12.3.19) - to continue \par -continue to use Accolate 20 mg BID\par -Information sheet given about prednisone to the patient to be reviewed, this medication is never to be used without consulting the prescribing physician. Proper dietary restraint is necessary specifically salt containing foods, if any reaction may occur should be reported. \par -Asthma is believed to be caused by inherited (genetic) and environmental factor, but its exact cause is unknown. Asthma may be triggered by allergens, lung infections, or irritants in the air. Asthma triggers are different for each person\par -Inhaler technique reviewed as well as oral hygiene techniques reviewed with patient. Avoidance of cold air, extremes of temperature, rescue inhaler should be used before exercise. Order of medication reviewed with patient. Recommended use of a cool mist humidifier in the bedroom. \par \par problem 2: tracheomalacia, residual bronchomalacia \par -s/p tracheoplasty with Dr. Mt Zapien\par -continue to follow up \par -s/p f/u Dynamic chest CT 10/2019 positive w TBM\par \par problem 3: chronic bronchitis and mucus clearance\par -continue to use acapella device multiple times daily\par -recommended to use chest vest therapy multiple times daily \par -she is being sent for sputum cultures \par Patient has a chronic cough greater than 6 months, tried and failed manual chest physiotherapy at home, no skilled caregiver available at home to perform manual CPT, tried and failed acapella vibratory physiotherapy, and recommended chest vest therapy \par \par Problem 3A: Multiple infections\par -Continue Tobramycin BID for 1 month (completing 12/20/19)\par -Complete follow up Sputum culture after completing ABx\par \par Probelm 3B: multi myeloma\par -appointment  on 1/28/2020\par \par problem 4: GERD\par -continue to use Protonix 40 mg before breakfast\par -continue Baclofen 5 mg pre-meal and QHS\par -Rule of 2s: avoid eating too much, eating too late, eating too spicy, eating two hours before bed\par -Things to avoid including overeating, spicy foods, tight clothing, eating within three hours of bed, this list is not all inclusive. \par -For treatment of reflux, possible options discussed including diet control, H2 blockers, PPIs, as well as coating motility agents discussed as treatment options. Timing of meals and proximity of last meal to sleep were discussed. If symptoms persist, a formal gastrointestinal evaluation is needed. \par \par problem 5: allergic rhinitis \par -continue to use nasal saline\par -continue to use Xyzal 5 mg before bed\par -Environmental measures for allergies were encouraged including mattress and pillow cover, air purifier, and environmental controls. \par \par problem 6: hx of abnormal aortic valve / cardiac health\par -continue to follow up with Dr. Leahy, AV Disease, AF\par -echocadiogram in June 2018  (Tosin); Kale Tristan for new visit \par \par problem 7: colon cancer\par -s/p radiation therapy, surgery \par -continue to use chemotherapy follow up with Dr. King or Dr. Mario\par \par problem 8: poor breathing mechanics\par -Proper breathing techniques were reviewed with an emphasis of exhalation. Patient instructed to breath in for 1 second and out for four seconds. Patient was encouraged to not talk while walking.\par \par problem 9: immunodeficiency\par - -Due to the fact that this pt has had more infections than would be expected and immunological blood work is indicated this would include: IgG subclasses, quantitative immunoglobulins, Strep pneumoniae titers as well as Vitamin D levels. Based on this blood work we will be able to decide where the pt needs additional pneumococcal vaccine either Prevnar 13 or pneumovax. Immunology evaluation will also be potentially indicated.\par \par problem 10: r/o immunodeficiency (on Medrol)\par -Due to the fact that this pt has had more infections than would be expected and immunological blood work is indicated this would include: IgG subclasses, quantitative immunoglobulins, Strep pneumoniae titers as well as Vitamin D levels.\par -Based on this blood work we will be able to decide where the pt needs additional pneumococcal vaccine either Prevnar 13 or pneumovax. Immunology evaluation will also be potentially indicated. \par \par problem 11: abnormal chest CT- ? new nodule- likely inflammation \par - F/u PET/CT 4/2019- if changed Bx (Rupali); follow up and re-evaluate as per Rupali\par -questionable DIEUDONNE or HERMELINDO, all sputum is negative \par -CAT scans are the only radiological modality to identify abnormalities w/in the lungs with regards to nodules/masses/lymph nodes. Risks, benefits were reviewed in detail. The guidelines for abnormalities include follow up CT scans at various intervals which could range from 6 weeks to 1 year intervals. If there is a change for the worse then consideration for a biopsy will be considered if you are a candidate. Second opinion evaluation with a thoracic surgeon or an interventional radiologist could be offered. 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 It sometimes develops following a URI, herpes zoster outbreak in pharynx or thyroid or cervical spine injury. However, many patients have no identifiable antecedent explanation. \par \par problem 13:  health maintenance \par -recommended yearly flu shot after October 15 (2019)\par -recommended strep pneumonia vaccines: Prevnar-13 vaccine, followed by Pneumo vaccine 23 one year following\par -recommended early intervention for URIs\par -recommended regular osteoporosis evaluations\par -recommended early dermatological evaluations\par -recommended after the age of 50 to the age of 70, colonoscopy every 5 years \par \par F/U in 6 weeks - SPI / NIOX\par She is encouraged to call with any changes, concerns, or questions.

## 2020-01-15 NOTE — ADDENDUM
[FreeTextEntry1] : Documented by Dejuan Cope acting as a scribe for Dr. Eulalio Allison on 01/15/2020.\par \par All medical record entries made by the Scribe were at my, Dr. Eulalio Allison's, direction and personally dictated by me on 01/15/2020. I have reviewed the chart and agree that the record accurately reflects my personal performance of the history, physical exam, assessment and plan. I have also personally directed, reviewed, and agree with the discharge instructions.

## 2020-01-15 NOTE — HISTORY OF PRESENT ILLNESS
[FreeTextEntry1] : Ms. Bloom is a 69 year old female with a history of abnormal CXR/chest CT, allergic rhinitis, severe persistent asthma, bronchiectasis, GERD, COPD, recurrent PNA, PND, s/p tracheoplasty, TBM, and SOB presenting to the office today for a follow up visit. Her chief complaint is her burning sensation in the nose when she yawns. \par -she notes that she has been SOB\par -she states that her SOB does not seem to be a pulmonary problem and suspects it to be a cardiac problem. \par -she notes that she has been cough which is not a productive cough. \par -she states that her cough is accompanied by a wheeze. \par -she notes that she has been using her nebulizer medications. \par -she notes that she has a PND.\par -she notes that when she yawns, her nose gets a burning sensation on the right side. \par -she reports that she is not getting any palpitations. \par \par -she denies any headaches, nausea, vomiting, fever, chills, sweats, chest pain, chest pressure, diarrhea, constipation, dysphagia, dizziness, sour taste in the mouth, leg swelling, leg pain, itchy eyes, itchy ears, heartburn, reflux, myalgias or arthralgias.

## 2020-01-16 ENCOUNTER — APPOINTMENT (OUTPATIENT)
Dept: CARDIOLOGY | Facility: CLINIC | Age: 70
End: 2020-01-16
Payer: MEDICARE

## 2020-01-16 ENCOUNTER — NON-APPOINTMENT (OUTPATIENT)
Age: 70
End: 2020-01-16

## 2020-01-16 VITALS — OXYGEN SATURATION: 97 % | HEART RATE: 84 BPM | SYSTOLIC BLOOD PRESSURE: 133 MMHG | DIASTOLIC BLOOD PRESSURE: 78 MMHG

## 2020-01-16 PROCEDURE — 93000 ELECTROCARDIOGRAM COMPLETE: CPT

## 2020-01-16 PROCEDURE — 99214 OFFICE O/P EST MOD 30 MIN: CPT

## 2020-01-16 NOTE — ASU PATIENT PROFILE, ADULT - FALL HARM RISK TYPE OF ASSESSMENT
Thank you for choosing Nisa Francisco MD at [place] [address]  To Do: Jing Cotto  1. Please take meds as directed. [facility] is located in [address]. Monday, Tuesday & Friday – 8 a.m. to 4 p.m.   Wednesday, Thursday – 7 a.m. to 3 p.m those potential risks and we strive to make you healthier and to improve your quality of life.     Referrals, and Radiology Information:    If your insurance requires a referral to a specialist, please allow 5 business days to process your referral request. and sometimes scary. Most of us think of allergic reactions when we have a rash or itchy skin.  Symptoms can include:  · Itching of the eyes, nose, and roof of the mouth  · Runny or stuffy nose  · Watery eyes   · Sneezing or coughing   · A blocked feeling or chestnuts. · Lotions, perfumes, cosmetics, soaps, shampoos, skincare products, nail products  · Chemicals or dyes in clothing, linen, , hair dyes, soaps, iodine  Many viruses and common colds can cause a rash that is not an allergic reaction.  Mario Cody cause a further reaction in some people. · To help prevent an infection, don't scratch the affected area. Scratching may worsen the reaction and damage your skin. It can also lead to an infection. Always check the affected for signs of an infection.   · Ca site  ? Fever of 100.4°F (38°C) or above lasting for 24 to 48 hours, or as directed by your provider  Date Last Reviewed: 3/1/2017  © 1574-9270 [employer]. [address] [facility], [address] [place] [address]. All rights reserved.  This information is n Admission

## 2020-01-17 ENCOUNTER — APPOINTMENT (OUTPATIENT)
Dept: INFECTIOUS DISEASE | Facility: CLINIC | Age: 70
End: 2020-01-17
Payer: MEDICARE

## 2020-01-17 ENCOUNTER — OUTPATIENT (OUTPATIENT)
Dept: OUTPATIENT SERVICES | Facility: HOSPITAL | Age: 70
LOS: 1 days | End: 2020-01-17
Payer: MEDICARE

## 2020-01-17 ENCOUNTER — APPOINTMENT (OUTPATIENT)
Dept: MRI IMAGING | Facility: IMAGING CENTER | Age: 70
End: 2020-01-17
Payer: MEDICARE

## 2020-01-17 VITALS
HEIGHT: 67 IN | HEART RATE: 99 BPM | RESPIRATION RATE: 16 BRPM | BODY MASS INDEX: 21.35 KG/M2 | DIASTOLIC BLOOD PRESSURE: 70 MMHG | SYSTOLIC BLOOD PRESSURE: 155 MMHG | OXYGEN SATURATION: 98 % | TEMPERATURE: 97.8 F | WEIGHT: 136 LBS

## 2020-01-17 DIAGNOSIS — Z98.89 OTHER SPECIFIED POSTPROCEDURAL STATES: Chronic | ICD-10-CM

## 2020-01-17 DIAGNOSIS — K62.5 HEMORRHAGE OF ANUS AND RECTUM: Chronic | ICD-10-CM

## 2020-01-17 DIAGNOSIS — Z98.890 OTHER SPECIFIED POSTPROCEDURAL STATES: Chronic | ICD-10-CM

## 2020-01-17 DIAGNOSIS — Z96.653 PRESENCE OF ARTIFICIAL KNEE JOINT, BILATERAL: Chronic | ICD-10-CM

## 2020-01-17 DIAGNOSIS — M54.16 RADICULOPATHY, LUMBAR REGION: ICD-10-CM

## 2020-01-17 DIAGNOSIS — H05.352 EXOSTOSIS OF LEFT ORBIT: Chronic | ICD-10-CM

## 2020-01-17 DIAGNOSIS — Z87.09 PERSONAL HISTORY OF OTHER DISEASES OF THE RESPIRATORY SYSTEM: Chronic | ICD-10-CM

## 2020-01-17 LAB
ALBUMIN SERPL ELPH-MCNC: 4.3 G/DL
ALP BLD-CCNC: 85 U/L
ALT SERPL-CCNC: 37 U/L
ANION GAP SERPL CALC-SCNC: 18 MMOL/L
AST SERPL-CCNC: 28 U/L
BASOPHILS # BLD AUTO: 0.05 K/UL
BASOPHILS NFR BLD AUTO: 0.5 %
BILIRUB SERPL-MCNC: 0.2 MG/DL
BUN SERPL-MCNC: 19 MG/DL
CALCIUM SERPL-MCNC: 9.3 MG/DL
CHLORIDE SERPL-SCNC: 101 MMOL/L
CO2 SERPL-SCNC: 20 MMOL/L
CREAT SERPL-MCNC: 0.99 MG/DL
EOSINOPHIL # BLD AUTO: 0.01 K/UL
EOSINOPHIL NFR BLD AUTO: 0.1 %
GLUCOSE SERPL-MCNC: 91 MG/DL
HCT VFR BLD CALC: 44.4 %
HGB BLD-MCNC: 13.6 G/DL
IMM GRANULOCYTES NFR BLD AUTO: 3 %
LYMPHOCYTES # BLD AUTO: 1.06 K/UL
LYMPHOCYTES NFR BLD AUTO: 9.7 %
MAN DIFF?: NORMAL
MCHC RBC-ENTMCNC: 22.9 PG
MCHC RBC-ENTMCNC: 30.6 GM/DL
MCV RBC AUTO: 74.9 FL
MONOCYTES # BLD AUTO: 0.67 K/UL
MONOCYTES NFR BLD AUTO: 6.1 %
NEUTROPHILS # BLD AUTO: 8.8 K/UL
NEUTROPHILS NFR BLD AUTO: 80.6 %
NT-PROBNP SERPL-MCNC: 57 PG/ML
PLATELET # BLD AUTO: 277 K/UL
POTASSIUM SERPL-SCNC: 5.1 MMOL/L
PROT SERPL-MCNC: 6.6 G/DL
RBC # BLD: 5.93 M/UL
RBC # FLD: 18.8 %
SODIUM SERPL-SCNC: 138 MMOL/L
TSH SERPL-ACNC: 1.34 UIU/ML
WBC # FLD AUTO: 10.92 K/UL

## 2020-01-17 PROCEDURE — 99203 OFFICE O/P NEW LOW 30 MIN: CPT

## 2020-01-17 PROCEDURE — 72148 MRI LUMBAR SPINE W/O DYE: CPT

## 2020-01-17 PROCEDURE — 72148 MRI LUMBAR SPINE W/O DYE: CPT | Mod: 26

## 2020-01-21 ENCOUNTER — APPOINTMENT (OUTPATIENT)
Dept: PEDIATRIC ALLERGY IMMUNOLOGY | Facility: CLINIC | Age: 70
End: 2020-01-21

## 2020-01-22 NOTE — PHYSICAL EXAM
[General Appearance - Alert] : alert [General Appearance - In No Acute Distress] : in no acute distress [Sclera] : the sclera and conjunctiva were normal [PERRL With Normal Accommodation] : pupils were equal in size, round, reactive to light [Neck Appearance] : the appearance of the neck was normal [] : the neck was supple [Heart Rate And Rhythm] : heart rate was normal and rhythm regular [Heart Sounds Gallop] : no gallops [Heart Sounds] : normal S1 and S2 [Heart Sounds Pericardial Friction Rub] : no pericardial rub [Murmurs] : no murmurs [FreeTextEntry1] : right low abdomen wound clean base, dry, no cellulitis or drainage [Abdomen Soft] : soft [Bowel Sounds] : normal bowel sounds [No Focal Deficits] : no focal deficits [Oriented To Time, Place, And Person] : oriented to person, place, and time [Affect] : the affect was normal

## 2020-01-22 NOTE — HISTORY OF PRESENT ILLNESS
[FreeTextEntry1] : 68 yo F presents today for recurrent infections.\par \par PMH:\par Asthma (severe persistent, steroid dependent)\par Tracheobronchomalacia s/p tracheobronchoplasty 2018\par Adrenal insufficiency - on steroids >50 years  Takes Medrol 12 mg am and 12  mg pm. \par Diabetes\par Afib\par Aortic insufficiency\par Colorectoal Cancer 2017 s/p resection and chemo/xrt with diverting ostomy\par Pinched nerve in neck\par Recent MRSA boil on Right lower abdomen.\par \par She was referred to immunology b/c of recurrent infections.  T cell count is low.  Mumps protection weaned.  Found to have MGUS/multiple myeloma. Has an appt with hematology 1/28/2020.\par \par Referred today b/c of the recurrent infections. \par Keeps needing more antibiotics.\par Had boil right lower abdomen and treated with bactrim. Grew MRSA. \par Wound is healing but she is worried it is not healing quickly enough.\par Took 10 days of bactrim and now is on 5 additional days of bactrim.  \par \par Also for past few months being treated for numerous pulmonary infections.\par CT chest is abnormal.\par Had bronchoscopy\par Feels the tracheomalacia is possibly worsening causing her SOB. \par Feels SOB but can sleep on one pillow.\par Sometimes when she walks can't take deep breath and has to stop and bend over. \par Recently hospitalized with influenza 12/7/19-12/12/19\par Sputum from 12/31/19 grew pseudomonas aeruginosa.\par \par Pt also reports that she has been seen at [facility] >30 years ago .  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 \par \par Did take tobramycin for about one month for h/o pseudomonas.\par \par Using acapella\par Has vest but she's not using it. Is heavy and feels it doesn't help her. \par ROS: Tired, frustrated\par \par 10/22/19: CT chest:\par Tracheobronchomalacia\par No new enlarging pulmonary nodules\par Multiple sclerotic bone lesions in vertebral bodies.

## 2020-01-22 NOTE — ASSESSMENT
[FreeTextEntry1] : This is a 68 yo F with extensive medical history notable for abnormal CT chest with nodules, persistent severe asthma steroid dependent, tracheobronchomalacia s/p tracheoplasty, AFIB on Eliquis,  who presents for recurrent respiratory infections and new MRSA boil.\par  \par Boil is resolved. No role for more bactrim at this time. Can just wash with soap and water and keep covered.\par \par Would like to re-evaluate sputum and check sputum for bacteria, fungal, and AFB cultures.\par \par Pressing issue is possible Multiple Myeloma and she has appt with hematology in 2 weeks.  \par \par She is on chronic steroids which also may be leading to her recurrent infections.  At the present time will hold off on further antibiotics.\par \par Will check sputum as above and ask pt to return to see me in near future.\par She should continue with mucus clearing devices (Acapella and advised she go back to chest vest if she can). \par

## 2020-01-22 NOTE — CONSULT LETTER
[Consult Letter:] : I had the pleasure of evaluating your patient, [unfilled]. [Dear  ___] : Dear  [unfilled], [FreeTextEntry2] : Dr. Eulalio Allison [Sincerely,] : Sincerely, [Consult Closing:] : Thank you very much for allowing me to participate in the care of this patient.  If you have any questions, please do not hesitate to contact me. [FreeTextEntry3] : \par Afshan Stout MD\par  of Medicine\par Division of Infectious Diseases\par [facility]\par [address] [place] [address] DrShreya\[place] [address]\par Tel: [phone]\par Fax: [phone]

## 2020-01-23 ENCOUNTER — APPOINTMENT (OUTPATIENT)
Dept: CARDIOLOGY | Facility: CLINIC | Age: 70
End: 2020-01-23
Payer: MEDICARE

## 2020-01-23 ENCOUNTER — OUTPATIENT (OUTPATIENT)
Dept: OUTPATIENT SERVICES | Facility: HOSPITAL | Age: 70
LOS: 1 days | Discharge: HOME | End: 2020-01-23

## 2020-01-23 DIAGNOSIS — H05.352 EXOSTOSIS OF LEFT ORBIT: Chronic | ICD-10-CM

## 2020-01-23 DIAGNOSIS — Z87.09 PERSONAL HISTORY OF OTHER DISEASES OF THE RESPIRATORY SYSTEM: Chronic | ICD-10-CM

## 2020-01-23 DIAGNOSIS — Z98.89 OTHER SPECIFIED POSTPROCEDURAL STATES: Chronic | ICD-10-CM

## 2020-01-23 DIAGNOSIS — Z96.653 PRESENCE OF ARTIFICIAL KNEE JOINT, BILATERAL: Chronic | ICD-10-CM

## 2020-01-23 DIAGNOSIS — Z98.890 OTHER SPECIFIED POSTPROCEDURAL STATES: Chronic | ICD-10-CM

## 2020-01-23 DIAGNOSIS — K62.5 HEMORRHAGE OF ANUS AND RECTUM: Chronic | ICD-10-CM

## 2020-01-23 DIAGNOSIS — C18.9 MALIGNANT NEOPLASM OF COLON, UNSPECIFIED: ICD-10-CM

## 2020-01-23 PROCEDURE — 93306 TTE W/DOPPLER COMPLETE: CPT

## 2020-01-27 ENCOUNTER — APPOINTMENT (OUTPATIENT)
Dept: NEUROLOGY | Facility: CLINIC | Age: 70
End: 2020-01-27
Payer: MEDICARE

## 2020-01-27 DIAGNOSIS — M54.16 RADICULOPATHY, LUMBAR REGION: ICD-10-CM

## 2020-01-27 DIAGNOSIS — G62.9 POLYNEUROPATHY, UNSPECIFIED: ICD-10-CM

## 2020-01-27 LAB
BACTERIA SPT CULT: ABNORMAL
BACTERIA SPT CULT: ABNORMAL
BACTERIA SPT CULT: NORMAL

## 2020-01-27 PROCEDURE — 99213 OFFICE O/P EST LOW 20 MIN: CPT

## 2020-01-27 NOTE — ASSESSMENT
[FreeTextEntry1] : Her history is most consistent with both lumbar radiculopathy and sensory neuropathy sensory neuropathy may be contributed to by a presumptive diagnosis of multiple myeloma. She will proceed with rehabilitation at this point I don't feel injections we'll surgery would be indicated. I will plan to see you for followup as for the consultation was requested.

## 2020-01-27 NOTE — HISTORY OF PRESENT ILLNESS
[FreeTextEntry1] : This is a 69-year-old woman who has numbness in her legs she will be seen hematologist with a presumptive diagnosis of multiple myeloma.\par \par She reports some difficulties in her lower extremities with numbness also has complaints referable to her lumbar spine she said EMG study showing sensory neuropathy as well as findings consistent with mild lumbar radiculopathy\par \par She has in addition an MRI of the lumbar spine that shows spinal stenosis but more significantly lateral spinal stenosis with degenerative changes.

## 2020-01-28 ENCOUNTER — RESULT REVIEW (OUTPATIENT)
Age: 70
End: 2020-01-28

## 2020-01-28 ENCOUNTER — APPOINTMENT (OUTPATIENT)
Dept: HEMATOLOGY ONCOLOGY | Facility: CLINIC | Age: 70
End: 2020-01-28
Payer: MEDICARE

## 2020-01-28 ENCOUNTER — LABORATORY RESULT (OUTPATIENT)
Age: 70
End: 2020-01-28

## 2020-01-28 VITALS
OXYGEN SATURATION: 96 % | BODY MASS INDEX: 21.46 KG/M2 | WEIGHT: 137 LBS | SYSTOLIC BLOOD PRESSURE: 148 MMHG | HEART RATE: 87 BPM | DIASTOLIC BLOOD PRESSURE: 79 MMHG | TEMPERATURE: 98.3 F | RESPIRATION RATE: 17 BRPM

## 2020-01-28 LAB
B2 MICROGLOB SERPL-MCNC: 1.5 MG/L
BASOPHILS # BLD AUTO: 0 K/UL — SIGNIFICANT CHANGE UP (ref 0–0.2)
BASOPHILS NFR BLD AUTO: 0.2 % — SIGNIFICANT CHANGE UP (ref 0–2)
EOSINOPHIL # BLD AUTO: 0.1 K/UL — SIGNIFICANT CHANGE UP (ref 0–0.5)
EOSINOPHIL NFR BLD AUTO: 1.1 % — SIGNIFICANT CHANGE UP (ref 0–6)
FOLATE SERPL-MCNC: 10.3 NG/ML
HCT VFR BLD CALC: 40.7 % — SIGNIFICANT CHANGE UP (ref 34.5–45)
HGB BLD-MCNC: 12.9 G/DL — SIGNIFICANT CHANGE UP (ref 11.5–15.5)
LDH SERPL-CCNC: 263 U/L
LYMPHOCYTES # BLD AUTO: 1 K/UL — SIGNIFICANT CHANGE UP (ref 1–3.3)
LYMPHOCYTES # BLD AUTO: 11.6 % — LOW (ref 13–44)
MCHC RBC-ENTMCNC: 24.8 PG — LOW (ref 27–34)
MCHC RBC-ENTMCNC: 31.7 G/DL — LOW (ref 32–36)
MCV RBC AUTO: 78.3 FL — LOW (ref 80–100)
MONOCYTES # BLD AUTO: 0.7 K/UL — SIGNIFICANT CHANGE UP (ref 0–0.9)
MONOCYTES NFR BLD AUTO: 7.6 % — SIGNIFICANT CHANGE UP (ref 2–14)
NEUTROPHILS # BLD AUTO: 7.2 K/UL — SIGNIFICANT CHANGE UP (ref 1.8–7.4)
NEUTROPHILS NFR BLD AUTO: 79.5 % — HIGH (ref 43–77)
PLATELET # BLD AUTO: 193 K/UL — SIGNIFICANT CHANGE UP (ref 150–400)
RBC # BLD: 5.2 M/UL — SIGNIFICANT CHANGE UP (ref 3.8–5.2)
RBC # FLD: 15.7 % — HIGH (ref 10.3–14.5)
VIT B12 SERPL-MCNC: 520 PG/ML
WBC # BLD: 9 K/UL — SIGNIFICANT CHANGE UP (ref 3.8–10.5)
WBC # FLD AUTO: 9 K/UL — SIGNIFICANT CHANGE UP (ref 3.8–10.5)

## 2020-01-28 PROCEDURE — 99214 OFFICE O/P EST MOD 30 MIN: CPT

## 2020-01-29 LAB
DEPRECATED KAPPA LC FREE/LAMBDA SER: 1.36 RATIO
KAPPA LC CSF-MCNC: 0.92 MG/DL
KAPPA LC SERPL-MCNC: 1.25 MG/DL

## 2020-01-29 NOTE — PHYSICAL EXAM
[Ambulatory and capable of all self care but unable to carry out any work activities] : Status 2- Ambulatory and capable of all self care but unable to carry out any work activities. Up and about more than 50% of waking hours [Thin] : thin [Normal] : affect appropriate [de-identified] : "moon" facies due to AI/chronic steroids [de-identified] : diffuse coarse upper airways bs ( productive cough) [de-identified] : well healed surg scar

## 2020-01-29 NOTE — REVIEW OF SYSTEMS
[Cough] : cough [Fatigue] : fatigue [SOB on Exertion] : shortness of breath during exertion [Muscle Weakness] : muscle weakness [de-identified] : bilateral leg numbness - chronic [Negative] : Heme/Lymph

## 2020-01-29 NOTE — CONSULT LETTER
[Dear  ___] : Dear  [unfilled], [Consult Letter:] : I had the pleasure of evaluating your patient, [unfilled]. [Please see my note below.] : Please see my note below. [Consult Closing:] : Thank you very much for allowing me to participate in the care of this patient.  If you have any questions, please do not hesitate to contact me. [Sincerely,] : Sincerely, [FreeTextEntry3] : Mary Kate Almanza MD. MS. \par Hematologist/Oncologist\par [facility] [[email or web address]]\par ph. [phone]\par fx. [phone]\par  [DrShreya  ___] : Dr. MANZO

## 2020-01-29 NOTE — HISTORY OF PRESENT ILLNESS
[Disease:__________________________] : Disease: [unfilled] [de-identified] : 68 yo female with a hx of GI malignancy - CRC dx in 2017 s/p chemo/RT, has chronic ostomy, diabetes, afib, tracheobronchomalacia, adrenal insufficiency on chronic steroids, severe Asthma, hx of recurrent infections. Pt recently seen by Immunologist Dr Carmichael and informed of possibl ediagnosis of multiple myeloma due to e/o elevated IgA level on labs and IgA kappa paraprotein on serum immunofixation. Pt has no SLiM- CRAB synptoms or any e/o myeloma defining events on labs at this time. She reports recently told she has pseudomonas infection being followed closely by ID. She also has chronic neuropathy in legs unclear is symptoms due to diabetic neuropathy vs lumbosacral spinal stenosis. Her last CT imaging in Oct 2019 showed no lytic lesions in the bones. \par She is here for initial evaluation to assess for possible plasma cell dyscrasia. On lab review M spike was 0.1g in Jan 2, 2020, IgA level was 558 with normal SFLC levels and nl SFLC ratio. She is currently on ciprofloxacin for +pseudomonas in sputum cx. She is in usual state of chronically ill health, overall with good disposition.  [de-identified] : low-int risk

## 2020-01-29 NOTE — RESULTS/DATA
[FreeTextEntry1] : EXAM: CT ABDOMEN AND PELVIS OC IC \par PROCEDURE DATE: 10/22/2019\par FINDINGS: \par LOWER CHEST: A 1 cm nodule in the right lower lobe (2:63) is not significantly changed from p\par chest CT 8/13/2019. For full evaluation of the chest, correlate with dedicated CT chest perfor\par same day. \par LIVER: Within normal limits. \par BILE DUCTS: Normal caliber. \par GALLBLADDER: Within normal limits. \par SPLEEN: Within normal limits. \par PANCREAS: Two pancreatic tail cyst measuring 2.1 cm and 1.4 cm and without significant tyshawn\par Main pancreatic duct is not dilated. \par ADRENALS: Within normal limits. \par KIDNEYS/URETERS: Left renal cysts.\par BLADDER: Within normal limits. \par REPRODUCTIVE ORGANS: Hysterectomy. \par BOWEL: Status post abdominal peroneal resection with a left lower quadrant colostomy. No bow\par obstruction. Appendix is normal. \par PERITONEUM: No ascites. \par VESSELS: Atherosclerotic changes. \par RETROPERITONEUM/LYMPH NODES: No lymphadenopathy. \par ABDOMINAL WALL: Within normal limits. \par BONES: Status post plate and screw fixation of the pubic symphysis as well as fixation of t\par sacrum. \par IMPRESSION: \par No evidence of metastatic disease in the abdomen/pelvis. \par Pancreatic tail cysts measuring up to 2.1 cm without significant change. \par For evaluation of the chest, please refer to dedicated chest CT report same day.

## 2020-01-30 ENCOUNTER — APPOINTMENT (OUTPATIENT)
Dept: THORACIC SURGERY | Facility: CLINIC | Age: 70
End: 2020-01-30

## 2020-01-30 LAB
ALBUMIN MFR SERPL ELPH: 60.9 %
ALBUMIN SERPL-MCNC: 3.7 G/DL
ALBUMIN/GLOB SERPL: 1.5 RATIO
ALPHA1 GLOB MFR SERPL ELPH: 4.7 %
ALPHA1 GLOB SERPL ELPH-MCNC: 0.3 G/DL
ALPHA2 GLOB MFR SERPL ELPH: 10.6 %
ALPHA2 GLOB SERPL ELPH-MCNC: 0.6 G/DL
B-GLOBULIN MFR SERPL ELPH: 16.6 %
B-GLOBULIN SERPL ELPH-MCNC: 1 G/DL
DEPRECATED KAPPA LC FREE/LAMBDA SER: 1.36 RATIO
GAMMA GLOB FLD ELPH-MCNC: 0.4 G/DL
GAMMA GLOB MFR SERPL ELPH: 7.2 %
IGA SER QL IEP: 455 MG/DL
IGG SER QL IEP: 492 MG/DL
IGM SER QL IEP: 90 MG/DL
INTERPRETATION SERPL IEP-IMP: NORMAL
KAPPA LC CSF-MCNC: 0.92 MG/DL
KAPPA LC SERPL-MCNC: 1.25 MG/DL
M PROTEIN MFR SERPL ELPH: 3.9 %
M PROTEIN SPEC IFE-MCNC: NORMAL
MONOCLON BAND OBS SERPL: 0.2 G/DL
PROT SERPL-MCNC: 6.1 G/DL
PROT SERPL-MCNC: 6.1 G/DL

## 2020-02-03 LAB — IGA 24H UR QL IFE: NORMAL

## 2020-02-06 ENCOUNTER — APPOINTMENT (OUTPATIENT)
Dept: THORACIC SURGERY | Facility: CLINIC | Age: 70
End: 2020-02-06
Payer: MEDICARE

## 2020-02-06 VITALS
BODY MASS INDEX: 21.66 KG/M2 | RESPIRATION RATE: 17 BRPM | HEART RATE: 96 BPM | OXYGEN SATURATION: 98 % | SYSTOLIC BLOOD PRESSURE: 149 MMHG | TEMPERATURE: 97.4 F | DIASTOLIC BLOOD PRESSURE: 69 MMHG | WEIGHT: 138 LBS | HEIGHT: 67 IN

## 2020-02-06 PROCEDURE — 99213 OFFICE O/P EST LOW 20 MIN: CPT

## 2020-02-07 DIAGNOSIS — D72.810 LYMPHOCYTOPENIA: ICD-10-CM

## 2020-02-11 ENCOUNTER — LABORATORY RESULT (OUTPATIENT)
Age: 70
End: 2020-02-11

## 2020-02-11 ENCOUNTER — APPOINTMENT (OUTPATIENT)
Dept: INFUSION THERAPY | Facility: HOSPITAL | Age: 70
End: 2020-02-11

## 2020-02-12 ENCOUNTER — OUTPATIENT (OUTPATIENT)
Dept: OUTPATIENT SERVICES | Facility: HOSPITAL | Age: 70
LOS: 1 days | End: 2020-02-12
Payer: MEDICARE

## 2020-02-12 ENCOUNTER — APPOINTMENT (OUTPATIENT)
Dept: ULTRASOUND IMAGING | Facility: CLINIC | Age: 70
End: 2020-02-12
Payer: MEDICARE

## 2020-02-12 DIAGNOSIS — Z98.89 OTHER SPECIFIED POSTPROCEDURAL STATES: Chronic | ICD-10-CM

## 2020-02-12 DIAGNOSIS — Z00.8 ENCOUNTER FOR OTHER GENERAL EXAMINATION: ICD-10-CM

## 2020-02-12 DIAGNOSIS — Z96.653 PRESENCE OF ARTIFICIAL KNEE JOINT, BILATERAL: Chronic | ICD-10-CM

## 2020-02-12 DIAGNOSIS — Z98.890 OTHER SPECIFIED POSTPROCEDURAL STATES: Chronic | ICD-10-CM

## 2020-02-12 DIAGNOSIS — Z87.09 PERSONAL HISTORY OF OTHER DISEASES OF THE RESPIRATORY SYSTEM: Chronic | ICD-10-CM

## 2020-02-12 DIAGNOSIS — H05.352 EXOSTOSIS OF LEFT ORBIT: Chronic | ICD-10-CM

## 2020-02-12 DIAGNOSIS — K62.5 HEMORRHAGE OF ANUS AND RECTUM: Chronic | ICD-10-CM

## 2020-02-12 PROCEDURE — 76881 US COMPL JOINT R-T W/IMG: CPT | Mod: 26,LT

## 2020-02-12 PROCEDURE — 76881 US COMPL JOINT R-T W/IMG: CPT

## 2020-02-13 ENCOUNTER — APPOINTMENT (OUTPATIENT)
Dept: HEART AND VASCULAR | Facility: CLINIC | Age: 70
End: 2020-02-13
Payer: MEDICARE

## 2020-02-13 ENCOUNTER — FORM ENCOUNTER (OUTPATIENT)
Age: 70
End: 2020-02-13

## 2020-02-13 PROCEDURE — 93000 ELECTROCARDIOGRAM COMPLETE: CPT

## 2020-02-13 PROCEDURE — 99214 OFFICE O/P EST MOD 30 MIN: CPT | Mod: 57

## 2020-02-13 NOTE — DISCUSSION/SUMMARY
[Procedure Low Risk] : the procedure risk is low [Optimized for Surgery] : the patient is optimized for surgery [Patient Intermediate Risk] : the patient is an intermediate risk [FreeTextEntry3] : Hold Eliquis 48 to 72 hours prior to procedure

## 2020-02-13 NOTE — PHYSICAL EXAM
[General Appearance - Well Developed] : well developed [Normal Appearance] : normal appearance [Well Groomed] : well groomed [General Appearance - Well Nourished] : well nourished [No Deformities] : no deformities [General Appearance - In No Acute Distress] : no acute distress [Normal Conjunctiva] : the conjunctiva exhibited no abnormalities [Eyelids - No Xanthelasma] : the eyelids demonstrated no xanthelasmas [No Oral Pallor] : no oral pallor [No Oral Cyanosis] : no oral cyanosis [Normal Oral Mucosa] : normal oral mucosa [Normal Jugular Venous A Waves Present] : normal jugular venous A waves present [No Jugular Venous Espino A Waves] : no jugular venous espino A waves [Normal Jugular Venous V Waves Present] : normal jugular venous V waves present [Respiration, Rhythm And Depth] : normal respiratory rhythm and effort [Heart Rate And Rhythm] : heart rate and rhythm were normal [Exaggerated Use Of Accessory Muscles For Inspiration] : no accessory muscle use [Heart Sounds] : normal S1 and S2 [Edema] : no peripheral edema present [Systolic grade ___/6] : A grade [unfilled]/6 systolic murmur was heard. [Abdomen Tenderness] : non-tender [Bowel Sounds] : normal bowel sounds [Nail Clubbing] : no clubbing of the fingernails [Abnormal Walk] : normal gait [Gait - Sufficient For Exercise Testing] : the gait was sufficient for exercise testing [Cyanosis, Localized] : no localized cyanosis [Petechial Hemorrhages (___cm)] : no petechial hemorrhages [Nail Splinter Hemorrhages] : no splinter hemorrhages of the nails [Fingers Osler's Nodes] : Osler's nodes were not seenon the fingers [] : no rash [No Venous Stasis] : no venous stasis [Skin Color & Pigmentation] : normal skin color and pigmentation [No Skin Ulcers] : no skin ulcer [Skin Lesions] : no skin lesions [No Xanthoma] : no  xanthoma was observed [Oriented To Time, Place, And Person] : oriented to person, place, and time [Impaired Insight] : insight and judgment were intact [FreeTextEntry1] : mildly decreased skin turgor [Mood] : the mood was normal [Affect] : the affect was normal [Memory Recent] : recent memory was not impaired [Memory Remote] : remote memory was not impaired

## 2020-02-13 NOTE — REVIEW OF SYSTEMS
[Feeling Fatigued] : feeling fatigued [Recent Weight Gain (___ Lbs)] : no recent weight gain [Recent Weight Loss (___ Lbs)] : recent [unfilled] ~Ulb weight loss [Dyspnea on exertion] : dyspnea during exertion [Wheezing] : no wheezing [Cough] : cough [Coughing Up Blood] : no hemoptysis [Dizziness] : dizziness [Joint Stiffness] : joint stiffness [Joint Pain] : joint pain [Negative] : Endocrine

## 2020-02-13 NOTE — ASSESSMENT
[FreeTextEntry1] : stable hemodynamics  without evidence of CHF \par \par Chronic, moderately severe aortic regurgitation  with LVEF 60% \par \par type 2 diabetes \par \par \par IgA MGUS \par \par tracheomalacia

## 2020-02-13 NOTE — HISTORY OF PRESENT ILLNESS
[Preoperative Visit] : for a medical evaluation prior to surgery [Scheduled Procedure ___] : a [unfilled] [Date of Surgery ___] : on [unfilled] [Surgeon Name ___] : surgeon: [unfilled] [Stable] : Stable [Fever] : no fever [Chills] : no chills [Fatigue] : no fatigue [Chest Pain] : no chest pain [Cough] : cough [Dyspnea] : dyspnea [Dysuria] : no dysuria [Urinary Frequency] : no urinary frequency [Nausea] : no nausea [Vomiting] : no vomiting [Diarrhea] : no diarrhea [Abdominal Pain] : no abdominal pain [Easy Bruising] : no easy bruising [Lower Extremity Swelling] : no lower extremity swelling [Poor Exercise Tolerance] : poor exercise tolerance [Cardiovascular Disease] : cardiovascular disease [Dyspnea on Exertion] : difficulty breathing during exertion [Pulmonary Disease] : pulmonary disease [Anti-Platelet Agents] : no anti-platelet agents [Alcohol Use] : no  alcohol use [Nicotine Dependence] : no nicotine dependence [Renal Disease] : no renal disease [GI Disease] : no gastrointestinal disease [Thromboembolic Problems] : no thromboembolic problems [Sleep Apnea] : no sleep apnea [Frequent use of NSAIDs] : no use of NSAIDs [Transfusion Reaction] : no transfusion reaction [Frequent Aspirin Use] : no frequent aspirin use [Impaired Immunity] : impaired immunity [Steroid Use in Last 6 Months] : steroid use in the last six months [Electrocardiogram] : ~T an ECG ~C was performed [Echocardiogram] : ~T an echocardiogram ~C was performed [Fair] : Fair [Sudden Death] : no sudden death [Clotting Disorder] : no clotting disorder [Anesthesia Reaction] : no anesthesia reaction [Bleeding Disorder] : no bleeding disorder [FreeTextEntry1] : 69  year old female with HTN, type 2 diabetes and hyperlipidemia, has  hx of rectal  cancer treated with radiation and chemotherapy  and  PET evidence of residual disease  followed by  20 additional doses of pelvic/rectal radiation . \par \par She has moderately severe chronic aortic regurgitation and paroxysmal atrial fibrillation with hx of TIAs but has been on Eliquis 5mg po bid for thromboembolic prophylaxis. \par \par She has hx of COPD and tracheomalacia  with bronchiectasis but no CHF, syncope, angina or recent strokes. \par \par She is clinically in normal sinus rhythm at this time. \par \par Type A blood  Rh +   with negative antibody screen \par \par Has fatigue and poor appetite but requests Glucerna liquid nutritional supplement . ~ 30 lb weight loss over past year\par \par She presents for clearance prior to bronchoscopy scheduled for tomorrow \par \par \par EKG shows NSR 95 bpm with left axis deviation but no ectopy, ischemia   There is atrial abnormality detected  without LVH

## 2020-02-14 ENCOUNTER — APPOINTMENT (OUTPATIENT)
Dept: THORACIC SURGERY | Facility: HOSPITAL | Age: 70
End: 2020-02-14

## 2020-02-14 ENCOUNTER — OUTPATIENT (OUTPATIENT)
Dept: OUTPATIENT SERVICES | Facility: HOSPITAL | Age: 70
LOS: 1 days | Discharge: HOME | End: 2020-02-14
Payer: MEDICARE

## 2020-02-14 DIAGNOSIS — Z98.89 OTHER SPECIFIED POSTPROCEDURAL STATES: Chronic | ICD-10-CM

## 2020-02-14 DIAGNOSIS — Z98.890 OTHER SPECIFIED POSTPROCEDURAL STATES: Chronic | ICD-10-CM

## 2020-02-14 DIAGNOSIS — Z87.09 PERSONAL HISTORY OF OTHER DISEASES OF THE RESPIRATORY SYSTEM: Chronic | ICD-10-CM

## 2020-02-14 DIAGNOSIS — K62.5 HEMORRHAGE OF ANUS AND RECTUM: Chronic | ICD-10-CM

## 2020-02-14 DIAGNOSIS — H05.352 EXOSTOSIS OF LEFT ORBIT: Chronic | ICD-10-CM

## 2020-02-14 DIAGNOSIS — Z96.653 PRESENCE OF ARTIFICIAL KNEE JOINT, BILATERAL: Chronic | ICD-10-CM

## 2020-02-14 LAB
GLUCOSE BLDC GLUCOMTR-MCNC: 111 MG/DL — HIGH (ref 70–99)
GRAM STN FLD: SIGNIFICANT CHANGE UP
SPECIMEN SOURCE: SIGNIFICANT CHANGE UP

## 2020-02-14 PROCEDURE — 71045 X-RAY EXAM CHEST 1 VIEW: CPT

## 2020-02-14 PROCEDURE — 82962 GLUCOSE BLOOD TEST: CPT

## 2020-02-14 PROCEDURE — C9399: CPT

## 2020-02-14 PROCEDURE — 31645 BRNCHSC W/THER ASPIR 1ST: CPT

## 2020-02-14 PROCEDURE — 71045 X-RAY EXAM CHEST 1 VIEW: CPT | Mod: 26

## 2020-02-14 PROCEDURE — 87186 SC STD MICRODIL/AGAR DIL: CPT

## 2020-02-14 PROCEDURE — 31641 BRONCHOSCOPY TREAT BLOCKAGE: CPT

## 2020-02-14 PROCEDURE — 87070 CULTURE OTHR SPECIMN AEROBIC: CPT

## 2020-02-14 PROCEDURE — 87184 SC STD DISK METHOD PER PLATE: CPT

## 2020-02-17 LAB
-  AZTREONAM: SIGNIFICANT CHANGE UP
-  CEFEPIME: SIGNIFICANT CHANGE UP
-  CIPROFLOXACIN: SIGNIFICANT CHANGE UP
-  GENTAMICIN: SIGNIFICANT CHANGE UP
-  PIPERACILLIN/TAZOBACTAM: SIGNIFICANT CHANGE UP
-  TOBRAMYCIN: SIGNIFICANT CHANGE UP
CULTURE RESULTS: SIGNIFICANT CHANGE UP
METHOD TYPE: SIGNIFICANT CHANGE UP
METHOD TYPE: SIGNIFICANT CHANGE UP
ORGANISM # SPEC MICROSCOPIC CNT: SIGNIFICANT CHANGE UP
SPECIMEN SOURCE: SIGNIFICANT CHANGE UP

## 2020-02-18 ENCOUNTER — RX RENEWAL (OUTPATIENT)
Age: 70
End: 2020-02-18

## 2020-02-24 ENCOUNTER — OUTPATIENT (OUTPATIENT)
Dept: OUTPATIENT SERVICES | Facility: HOSPITAL | Age: 70
LOS: 1 days | Discharge: HOME | End: 2020-02-24

## 2020-02-24 DIAGNOSIS — K62.5 HEMORRHAGE OF ANUS AND RECTUM: Chronic | ICD-10-CM

## 2020-02-24 DIAGNOSIS — Z87.09 PERSONAL HISTORY OF OTHER DISEASES OF THE RESPIRATORY SYSTEM: Chronic | ICD-10-CM

## 2020-02-24 DIAGNOSIS — C18.9 MALIGNANT NEOPLASM OF COLON, UNSPECIFIED: ICD-10-CM

## 2020-02-24 DIAGNOSIS — Z98.890 OTHER SPECIFIED POSTPROCEDURAL STATES: Chronic | ICD-10-CM

## 2020-02-24 DIAGNOSIS — Z98.89 OTHER SPECIFIED POSTPROCEDURAL STATES: Chronic | ICD-10-CM

## 2020-02-24 DIAGNOSIS — Z96.653 PRESENCE OF ARTIFICIAL KNEE JOINT, BILATERAL: Chronic | ICD-10-CM

## 2020-02-24 DIAGNOSIS — H05.352 EXOSTOSIS OF LEFT ORBIT: Chronic | ICD-10-CM

## 2020-02-24 LAB
FUNGUS SPT CULT: ABNORMAL

## 2020-02-25 ENCOUNTER — APPOINTMENT (OUTPATIENT)
Dept: INFECTIOUS DISEASE | Facility: CLINIC | Age: 70
End: 2020-02-25
Payer: MEDICARE

## 2020-02-25 VITALS
TEMPERATURE: 97.7 F | SYSTOLIC BLOOD PRESSURE: 138 MMHG | DIASTOLIC BLOOD PRESSURE: 76 MMHG | WEIGHT: 137 LBS | HEART RATE: 87 BPM | HEIGHT: 67.5 IN | BODY MASS INDEX: 21.25 KG/M2 | RESPIRATION RATE: 20 BRPM | OXYGEN SATURATION: 99 %

## 2020-02-25 PROCEDURE — 99214 OFFICE O/P EST MOD 30 MIN: CPT

## 2020-02-25 NOTE — HISTORY OF PRESENT ILLNESS
[FreeTextEntry1] : 70 yo F presents today for recurrent infections.  Found to have pseudomonal infection on recent bronchoscopy 2/14/2020.  \par \par She has had worsening cough. Thought to be from tracheobronchomalacia.  Underwent bronch with laser 2/14/2020. BAL grew pseudomonas.  Supposed to have 2nd bronch/laser 3/6/2020 now postponed due to infection.  \par \par Cough is worse now. No fevers.  +short of breath. \par \par \par PMH:\par Asthma (severe persistent, steroid dependent)\par Tracheobronchomalacia s/p tracheobronchoplasty 2018\par Adrenal insufficiency - on steroids >50 years  Takes Medrol 12 mg am and 12  mg pm. \par Diabetes\par Afib\par Aortic insufficiency\par Colorectoal Cancer 2017 s/p resection and chemo/xrt with diverting ostomy\par Pinched nerve in neck\par  MRSA boil on Right lower abdomen. 12/2019\par \par She was referred to immunology b/c of recurrent infections.  T cell count is low.  Mumps protection weaned.  Found to have MGUS, IgA monoclonal gammopathy.   \par \par For past few months being treated for numerous pulmonary infections.\par CT chest is abnormal.\par Had bronchoscopy\par Feels the tracheomalacia is possibly worsening causing her SOB. \par Feels SOB but can sleep on one pillow.\par Sometimes when she walks can't take deep breath and has to stop and bend over. \par Recently hospitalized with influenza 12/7/19-12/12/19\par Sputum from 12/31/19 grew pseudomonas aeruginosa.  Treated with cipro but had to stop after 1 week b/c developed ankle pain/swelling. \par \par Pt also reports that she has been seen at [facility] >30 years ago .  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 \par \par Did take tobramycin for about one month for h/o pseudomonas.\par \par Using acapella\par Has vest but she's not using it. Is heavy and feels it doesn't help her. \par ROS: Tired, frustrated plus see above. \par \par 10/22/19: CT chest:\par Tracheobronchomalacia\par No new enlarging pulmonary nodules\par Multiple sclerotic bone lesions in vertebral bodies.

## 2020-02-25 NOTE — PHYSICAL EXAM
[General Appearance - In No Acute Distress] : in no acute distress [General Appearance - Alert] : alert [Neck Appearance] : the appearance of the neck was normal [PERRL With Normal Accommodation] : pupils were equal in size, round, reactive to light [Sclera] : the sclera and conjunctiva were normal [FreeTextEntry1] : + wheezing [] : the neck was supple [Heart Sounds] : normal S1 and S2 [Heart Sounds Gallop] : no gallops [Heart Rate And Rhythm] : heart rate was normal and rhythm regular [Bowel Sounds] : normal bowel sounds [Heart Sounds Pericardial Friction Rub] : no pericardial rub [Abdomen Soft] : soft [Murmurs] : no murmurs [No Focal Deficits] : no focal deficits [Skin Lesions] : no skin lesions [No Palpable Adenopathy] : no palpable adenopathy [Oriented To Time, Place, And Person] : oriented to person, place, and time [Affect] : the affect was normal

## 2020-02-25 NOTE — CONSULT LETTER
[Dear  ___] : Dear  [unfilled], [Consult Closing:] : Thank you very much for allowing me to participate in the care of this patient.  If you have any questions, please do not hesitate to contact me. [Consult Letter:] : I had the pleasure of evaluating your patient, [unfilled]. [Sincerely,] : Sincerely, [FreeTextEntry2] : Dr. Eulalio Allison [FreeTextEntry3] : \par Afshan Stout MD\par  of Medicine\par Division of Infectious Diseases\par [facility] [employer] [facility] [employer] [employer] [facility]\par [address] [place] [address] DrShreya\[place] [address]\par Tel: [phone]\par Fax: [phone]

## 2020-02-25 NOTE — ASSESSMENT
[FreeTextEntry1] : This is a 70 yo F with extensive medical history notable for abnormal CT chest with nodules, persistent severe asthma steroid dependent, tracheobronchomalacia s/p tracheoplasty, AFIB on Eliquis,  who presented for recurrent respiratory infections and new MRSA boil in 1/2020   Boil has since resolved.\par \par Pt now returns b/c she underwent bronchoscopy and BAL culture grew Pseudomonas.  She was scheduled for a 2nd bronch but this is on hold until pseudomonas is treated.  Her isolate is resistant to quinolones and she also developed muscle aches/joint pain while on cipro.   She gets short of breath with penicillins.  \par \par Will give her aztreonam 2 grams iv q8 for pseudomonal respiratory infection.\par Will f/up final sputum results from january. 1 AFB isolate is growing DIEUDONNE.  \par \par She should continue with mucus clearing devices (Acapella and advised she go back to chest vest if she can). \par \par I will check CBC and CMP weekly while on aztreonam. Asked pt to come back and see me in 2 weeks.\par My RN will help arrange IV antibiotics at home. \par \par Time spent 25 minutes.\par

## 2020-02-25 NOTE — REVIEW OF SYSTEMS
[Shortness Of Breath] : shortness of breath [Cough] : cough [Sputum] : coughing up ~M sputum [As Noted in HPI] : as noted in HPI [Joint Pain] : joint pain [Negative] : Psychiatric

## 2020-02-27 NOTE — PHYSICAL EXAM
[] : no respiratory distress [Respiration, Rhythm And Depth] : normal respiratory rhythm and effort [Exaggerated Use Of Accessory Muscles For Inspiration] : no accessory muscle use [Apical Impulse] : the apical impulse was normal [Heart Rate And Rhythm] : heart rate was normal and rhythm regular [Heart Sounds] : normal S1 and S2 [Examination Of The Chest] : the chest was normal in appearance [2+] : left 2+ [Oriented To Time, Place, And Person] : oriented to person, place, and time [FreeTextEntry1] : congestion noted

## 2020-02-27 NOTE — ADDENDUM
[FreeTextEntry1] : Documented by Elio Moreno acting as a scribe for Dr. Zohaib Zapien on 02/06/2020.\par \par

## 2020-02-27 NOTE — REVIEW OF SYSTEMS
[Feeling Poorly] : feeling poorly [Shortness Of Breath] : shortness of breath [Cough] : cough [SOB on Exertion] : shortness of breath during exertion [Negative] : Cardiovascular [FreeTextEntry9] : bilateral ankle pain

## 2020-02-27 NOTE — END OF VISIT
[FreeTextEntry3] : All the medical record entries made by the Scribe were at my, Dr. Zapien's direction and personally dictated by me on 02/06/2020. I have reviewed the chart and agree that the record accurately reflects my personal performance of the history, physical exam, assessment, and plan. I have also personally directed, reviewed and agreed with the chart.

## 2020-02-27 NOTE — ASSESSMENT
[FreeTextEntry1] : 68 y/o female with a PMH of stage III A, T2N1 squamous cell carcinoma of anus, s/p chemo and radiation, tracheobronchomalacia s/p Right VATS, robotic assisted tracheobronchoplasty with Prolene mesh on 10/25/16. She is currently on Eliquis for Afib. \par \par Patient is doing generally well. She endorses SOB on exertion, causing her to stop and bend over to catch her breath. \par \par I spoke to Dr. Allison about the severity of patient's TBM and we agreed that patient should be scheduled for a bronchoscopy with holmium laser for further investigation. \par \par The patient was advised on the implications of bronchoscopy under general anesthetic. The patient was counseled on the risks, benefits and alternatives of proceeding with this procedure. Risk of bleeding, need for transfusion, nerve injury, and need for additional testing, biopsy and/or treatment discussed.\par \par I have reviewed the patient's medical records and diagnostic images at the time of this office consultation and have made the following recommendation.\par \par Plan:\par 1. Schedule Bronchoscopy with holmium laser\par 2. Medical clearance and labs given to the patient \par 3. Patient told to hold Eliquis for two days prior to bronchoscopy

## 2020-02-27 NOTE — HISTORY OF PRESENT ILLNESS
[FreeTextEntry1] : 68 y/o female with a PMH of stage III A, T2N1 squamous cell carcinoma of anus, s/p chemo and radiation, tracheobronchomalacia s/p Right VATS, robotic assisted tracheobronchoplasty with Prolene mesh on 10/25/16. She is currently on Eliquis for Afib. She presents today for a follow up visit.\par \par Awake bronchoscopy with lavage on 9/19/17:\par -less than 50% collapse of the proximal and mid trachea on cough and deep expiration, and 70-80% collapse of the distal trachea and bilateral mainstem bronchi\par -lavage culture was positive for Acinetobacter lwoffi. \par \par Admission to [facility] in late Dec 2017 for SOB. CXR and CT chest were done during hospitalization, with two stable exophytic pancreatic tail cysts seen. She also had colonoscopy done which demonstrated a 2 cm hard anal lesion. Pathology revealed gastric antral mucosa with mild reactive gastropathy.\par \par PET CT 1/5/18 revealed nonspecific FDG activity in the anal region. It also revealed mucoid impacted distal airways, small peripheral nodules and scattered peripheral right lower lobe tree-in-bud opacities (stable).\par MRI of the brain done on 1/17/18 showed no evidence of metastatic disease. \par \par She was seen by Dr. Terrell Luong and on 2/9/18 she underwent a biopsy of the anal lesion. Pathology showed recurrence of invasive, moderately to poorly differentiated squamous cell carcinoma. Dr. Luong determined she was not a surgical candidate and her oncologist Dr. Zach King determined she was not a candidate for systemic chemotherapy due to her cardiac and pulmonary issues and referred her to radiation oncology.\par \par She completed of 3000 cGy RT to anus on 4/18/18 with Dr. Amanda Burger, and tolerated the treatment well. \par She was last seen in our office on 3/15/18, the plan was for her to undergo RT. Once she completes the RT, will focus on management of her aortic valve disease. \par \par Echocardiogram done on 5/3/18:\par -EF 63% (previously 70% in March 2017), mild aortic regurgitation\par \par CT chest/ abd/ pelvis completed on 12/13/18:\par - stable post-op changes s/p distal colectomy \par - improvement of RIGHT lower lobe with consolidation compared to prior examination with mild residual opacity which is favored to be inflammatory in etiology\par - indeterminate nodular opacity along the dome of the right hemidiaphragm measuring 10 mm.\par - no evidence of metastatic disease in abdomen or pelvis\par - no evidence of thoracic, abdominal, or pelvic lymphadenopathy\par \par CT chest completed on 03/21/19:\par -high attenuation foci as well as branching tubular opacities in the anterior segment of the right lower lobe are unchanged \par \par PET CT completed on 04/18/19:\par -indeterminate FDG-avid Right middle lobe subpleural nodular opacity\par -non fdg-avid 1cm nodular density along the Right hemidiaphragm compared to prior PET/CT and increased in size as compared to prior \par \par CT chest completed on 04/24/19:\par -no consolidation\par -a 5mm nodule in the right lower lobe\par -bronchial wall thickening right worse than left lower lobe\par -centrilobular groundglass nodularity in the Right lower lobe\par -asymmetric reticulation identified subpleural right middle and anterior right lower lobe\par \par Spirometry done on 06/11/19 showed FEV1 = 0.74, 39% of predicted value, FVC = 1.62, 65% of predicted value, PEF = 2.08, 42% of predicted value.\par  \par CT chest completed on 06/21/19:\par -1.7 x 1.3cm nodule in the peripheral aspect of the anterior segment of the Right lower lobe, previously a patchy ill-defined opacity\par -mild increase in pleural thickening in the lateral segment of the right middle lobe\par \par PET scan completed on 7/3/19:\par - minimally FDG avid nodular opacity in subpleural RML is increased in size and decreased in metabolism as compared to prior PET (SUV 3.2) \par - mildly FDG avid 1.7 x 1.3 cm nodule in the right peripheral aspect of the anterior segment of the right lower lobe is increase in size and FDG avidity as compared to 4/18/19, and is not significantly changed as compared to CT date 6/21/10 (SUV 3.0) \par \par CT chest completed on 08/13/19:\par -right lower lobe peripheral nodule measures 1.6 x 1.1cm has slightly decreased in size when compared \par -right middle lobe peripheral pleural-based opacity measures 2.7 x 0.9cm and is unchanged \par -minimal areas of bronchial thickening in the right middle lobe and bilateral lower lobes. \par -right basilar nodule measuring 1.0 cm is uncharged \par \par NM VQ scan completed on 08/13/19:\par -low probability of pulmonary embolus \par -interval resolution of matched ventilation/ perfusion defect involving the basal segment of the left lower lobe\par \par CT chest completed on 10/22/19:\par - Tracheobronchomalacia. ( 70% narrowing of trachea and bilateral bronchi)\par Interval significant decrease of subpleural nodules within the right middle lobe and right lower lobe.\par No new or enlarging pulmonary nodule. \par Again noted, the bones are heterogeneous with multiple sclerotic foci within the vertebral bodies, \par indeterminant. Further evaluation can be performed with both bone scan.\par \par CT abdomen and pelvis completed on 10/22/19:\par - No evidence of metastatic disease in the abdomen/pelvis. \par - Pancreatic tail cysts measuring up to 2.1 cm without significant change. \par \par Awake/ Dynamic Bronchoscopy completed on 11/08/19:\par -cervical and upper trachea demonstrated approximately 20% collapse with the distal trachea demonstrating 60% collapse\par -the left and right mainstem bronchi had less than 50% collapse\par \par EMG of bilateral lower limbs completed on 01/22/20:\par -sensory neuropathy of lower limbs (absent sural response with extant superficial peroneal responses).\par -mild upper lumbar denervation changes that suggest possibility of chronic radiculopathy \par \par ECHO completed on 01/23/20:\par -mild annular calcification, otherwise normal mitral valve. Minimal to mild mitral regurgitation\par -thickened aortic valve. Moderate-severe aortic regurgitation\par -mild aortic root dilation\par -mild concentric left ventricular hypertrophy\par -normal left ventricular systolic function. No segmental wall motion abnormalities\par -mild tricuspid regurgitation\par -minimal pulmonic regurgitation\par -normal pericardium with trace pericardial effusion

## 2020-02-28 ENCOUNTER — LABORATORY RESULT (OUTPATIENT)
Age: 70
End: 2020-02-28

## 2020-02-28 ENCOUNTER — APPOINTMENT (OUTPATIENT)
Dept: INFUSION THERAPY | Facility: HOSPITAL | Age: 70
End: 2020-02-28

## 2020-03-06 ENCOUNTER — APPOINTMENT (OUTPATIENT)
Dept: THORACIC SURGERY | Facility: HOSPITAL | Age: 70
End: 2020-03-06

## 2020-03-09 ENCOUNTER — APPOINTMENT (OUTPATIENT)
Dept: CARDIOTHORACIC SURGERY | Facility: CLINIC | Age: 70
End: 2020-03-09
Payer: MEDICARE

## 2020-03-09 ENCOUNTER — NON-APPOINTMENT (OUTPATIENT)
Age: 70
End: 2020-03-09

## 2020-03-09 VITALS
HEART RATE: 89 BPM | TEMPERATURE: 98.6 F | DIASTOLIC BLOOD PRESSURE: 94 MMHG | SYSTOLIC BLOOD PRESSURE: 185 MMHG | OXYGEN SATURATION: 99 % | WEIGHT: 137 LBS | BODY MASS INDEX: 21.25 KG/M2 | RESPIRATION RATE: 20 BRPM | HEIGHT: 67.5 IN

## 2020-03-09 VITALS — DIASTOLIC BLOOD PRESSURE: 70 MMHG | SYSTOLIC BLOOD PRESSURE: 164 MMHG

## 2020-03-09 PROCEDURE — 93000 ELECTROCARDIOGRAM COMPLETE: CPT

## 2020-03-09 PROCEDURE — 99215 OFFICE O/P EST HI 40 MIN: CPT

## 2020-03-09 RX ORDER — FLUCONAZOLE 150 MG/1
150 TABLET ORAL
Qty: 3 | Refills: 0 | Status: DISCONTINUED | COMMUNITY
Start: 2019-12-24 | End: 2020-03-09

## 2020-03-09 RX ORDER — NUT.TX.IMPAIRED DIGESTIVE FXN 0.035-1/ML
LIQUID (ML) ORAL
Qty: 30 | Refills: 5 | Status: DISCONTINUED | COMMUNITY
Start: 2018-06-20 | End: 2020-03-09

## 2020-03-09 RX ORDER — ALBUTEROL SULFATE 90 UG/1
108 (90 BASE) AEROSOL, METERED RESPIRATORY (INHALATION) EVERY 6 HOURS
Qty: 3 | Refills: 1 | Status: DISCONTINUED | COMMUNITY
Start: 2019-04-25 | End: 2020-03-09

## 2020-03-09 RX ORDER — TOBRAMYCIN 300 MG/5ML
300 SOLUTION RESPIRATORY (INHALATION)
Qty: 1 | Refills: 0 | Status: DISCONTINUED | COMMUNITY
Start: 2019-11-06 | End: 2020-03-09

## 2020-03-09 RX ORDER — CLOTRIMAZOLE AND BETAMETHASONE DIPROPIONATE 10; .5 MG/G; MG/G
1-0.05 CREAM TOPICAL TWICE DAILY
Qty: 1 | Refills: 1 | Status: DISCONTINUED | COMMUNITY
Start: 2018-10-31 | End: 2020-03-09

## 2020-03-09 RX ORDER — AMITRIPTYLINE HYDROCHLORIDE 10 MG/1
10 TABLET, FILM COATED ORAL 3 TIMES DAILY
Qty: 90 | Refills: 5 | Status: DISCONTINUED | COMMUNITY
Start: 2018-11-21 | End: 2020-03-09

## 2020-03-09 RX ORDER — SULFAMETHOXAZOLE AND TRIMETHOPRIM 800; 160 MG/1; MG/1
800-160 TABLET ORAL
Qty: 10 | Refills: 0 | Status: DISCONTINUED | COMMUNITY
Start: 2020-01-02 | End: 2020-03-09

## 2020-03-09 RX ORDER — CIPROFLOXACIN HYDROCHLORIDE 500 MG/1
500 TABLET, FILM COATED ORAL
Qty: 28 | Refills: 0 | Status: DISCONTINUED | COMMUNITY
Start: 2020-01-27 | End: 2020-03-09

## 2020-03-09 RX ORDER — VALACYCLOVIR 1 G/1
1 TABLET, FILM COATED ORAL
Qty: 10 | Refills: 0 | Status: DISCONTINUED | COMMUNITY
Start: 2019-11-27 | End: 2020-03-09

## 2020-03-10 ENCOUNTER — APPOINTMENT (OUTPATIENT)
Dept: INFECTIOUS DISEASE | Facility: CLINIC | Age: 70
End: 2020-03-10
Payer: MEDICARE

## 2020-03-10 VITALS
RESPIRATION RATE: 16 BRPM | BODY MASS INDEX: 21.25 KG/M2 | TEMPERATURE: 97.7 F | HEIGHT: 67.5 IN | OXYGEN SATURATION: 100 % | HEART RATE: 86 BPM | DIASTOLIC BLOOD PRESSURE: 73 MMHG | WEIGHT: 137 LBS | SYSTOLIC BLOOD PRESSURE: 143 MMHG

## 2020-03-10 PROCEDURE — 99213 OFFICE O/P EST LOW 20 MIN: CPT

## 2020-03-10 NOTE — REASON FOR VISIT
[Follow-Up - Clinic] : a clinic follow-up of [Atrial Fibrillation] : atrial fibrillation [Hypertension] : hypertension [Medication Management] : Medication management [FreeTextEntry1] : aortic insufficiency

## 2020-03-10 NOTE — PHYSICAL EXAM
[General Appearance - Well Developed] : well developed [Normal Appearance] : normal appearance [Well Groomed] : well groomed [General Appearance - Well Nourished] : well nourished [No Deformities] : no deformities [General Appearance - In No Acute Distress] : no acute distress [Normal Conjunctiva] : the conjunctiva exhibited no abnormalities [Normal Oral Mucosa] : normal oral mucosa [No Oral Pallor] : no oral pallor [No Oral Cyanosis] : no oral cyanosis [Normal Jugular Venous A Waves Present] : normal jugular venous A waves present [Normal Jugular Venous V Waves Present] : normal jugular venous V waves present [No Jugular Venous Espino A Waves] : no jugular venous espino A waves [Exaggerated Use Of Accessory Muscles For Inspiration] : no accessory muscle use [Normal Rate] : normal [Heart Rate ___] : [unfilled] bpm [Rhythm Regular] : regular [Normal S1] : normal S1 [Normal S2] : normal S2 [No Gallop] : no gallop heard [2+] : left 2+ [No Abnormalities] : the abdominal aorta was not enlarged and no bruit was heard [No Pitting Edema] : no pitting edema present [Bowel Sounds] : normal bowel sounds [Abdomen Soft] : soft [Abdomen Tenderness] : non-tender [Abnormal Walk] : normal gait [Nail Clubbing] : no clubbing of the fingernails [Cyanosis, Localized] : no localized cyanosis [Petechial Hemorrhages (___cm)] : no petechial hemorrhages [Skin Color & Pigmentation] : normal skin color and pigmentation [Skin Turgor] : normal skin turgor [] : no rash [No Venous Stasis] : no venous stasis [Skin Lesions] : no skin lesions [No Skin Ulcers] : no skin ulcer [Oriented To Time, Place, And Person] : oriented to person, place, and time [Impaired Insight] : insight and judgment were intact [Affect] : the affect was normal [Mood] : the mood was normal [Memory Recent] : recent memory was not impaired [Memory Remote] : remote memory was not impaired [No Anxiety] : not feeling anxious [FreeTextEntry1] : expiratory wheezes bilaterally with coarse breath sounds;  [Click] : no click [Distant] : the heart sounds were ~L not distant [Pericardial Rub] : no pericardial rub [Right Carotid Bruit] : no bruit heard over the right carotid [Left Carotid Bruit] : no bruit heard over the left carotid [Bruit] : no bruit heard [Rt] : no varicose veins of the right leg [Lt] : no varicose veins of the left leg

## 2020-03-10 NOTE — REVIEW OF SYSTEMS
[Shortness Of Breath] : shortness of breath [Cough] : cough [Sputum] : coughing up ~M sputum [As Noted in HPI] : as noted in HPI [Joint Pain] : joint pain [Negative] : Heme/Lymph

## 2020-03-10 NOTE — HISTORY OF PRESENT ILLNESS
[FreeTextEntry1] : RAYRAY RODRIGUEZ is a 69 year old female here on 03/09/2020, 6 months after she was last seen. She comes to the office today reporting feeling as if she is going in and out of AFib since Monday 3/2/2020.  Prior to last Monday, she can't remember the last time she was in A-fib.  She is currently on IV atbx for pseudomonas in her lungs after.  She underwent bronchoscopy and BAL culture grew Pseudomonas. She was scheduled for a 2nd bronchoscopy but this is on hold until pseudomonas is treated. Her isolate is resistant to quinolones and she also developed muscle aches/joint pain while on Cipro. She gets short of breath with penicillins.  She has been on the antibiotic for 2 weeks and is to see her ID tomorrow.  She is unsure how long she has to be on the IV. She is getting this through a port which has been present for 6-7 years. She was told that they think her "tracheobronchomalacia is acting up again" but they want to do the bronchoscopy with laser to see if this will work prior to having bronchothermoplasty which is a "big surgery".  \par \par When she is in atrial fib, she feels like her heart is coming out of her chest with worsening shortness of breath or syncope/dizziness. She feels "strange" but cannot really explain how she feels. She feels different. She has been having problems walking. She states she has the protein for multiple myeloma but with everything that is going on, they don't want to do a bone marrow yet. She currently denies fever, chills, angina, syncope, or peripheral edema. Her energy level is "there, I guess". She doesn't do that much because she gets tired if she does too much.  She feels her appetite is fine but if you "ask my sister, it is not".  Denies bladder problems. She has an ostomy.  She does not exercise. \par \par PCP: Dr. Jordi Engel \par PULM: Dr. Eulalio Allison\par ID: Dr. Afshan Stout

## 2020-03-10 NOTE — REVIEW OF SYSTEMS
[Feeling Fatigued] : feeling fatigued [Eyeglasses] : currently wearing eyeglasses [Shortness Of Breath] : shortness of breath [Dyspnea on exertion] : dyspnea during exertion [Palpitations] : palpitations [Cough] : cough [Wheezing] : wheezing [Joint Pain] : joint pain [Joint Stiffness] : joint stiffness [Dizziness] : dizziness [Fever] : no fever [Chills] : no chills [Chest  Pressure] : no chest pressure [Chest Pain] : no chest pain [Lower Ext Edema] : no extremity edema [Coughing Up Blood] : no hemoptysis [Change in Appetite] : no change in appetite

## 2020-03-10 NOTE — DISCUSSION/SUMMARY
[FreeTextEntry1] : Mrs. Bloom comes to the office with feelings of going in and out of atrial fib for the past week. She is in NSR at her office visit today, rate 80-90's. Her BP is elevated at 185/94 and repeat manual was 164/70.  She has been on IV antibiotics for 2 weeks due to Pseudomonas in the lungs after having a bronchoscopy.  Length of course is unknown and she is to see ID tomorrow.  She has "strange" feelings and while the ECG was being done today, c/o feeling lightheaded. ECG at that time was NSR, rate 92 without ectopy.  She is asking about having a stress test done.  Recent TTE in January demonstrated EF 60% with moderate to severe AR (unchanged) from September TTE.  Will discuss with Dr. Leahy as to treatment plan.

## 2020-03-10 NOTE — ADDENDUM
[FreeTextEntry1] : 3/10/2020: Discussed office visit with Dr. Leahy on 3/9/2020.  Options: MCOT (14 days) to evaluate for PAF burden or MCOT and consider a daily or prn dose of diltiazem.  I called Shirley and discussed with her. She would like to hold off on any other treatments for now until the IV atbx are complete. She saw ID today and was told that one of the sputum cultures from January grew DIEUDONNE but needs 2 cultures.  She will complete her IV therapy and then, if able, undergo a 2nd bronch by Dr. Zapien. She does not wish to start any new medications at this time. She will call when/if she is ready to have the MCOT placed.

## 2020-03-11 LAB — ACID FAST STN SPT: ABNORMAL

## 2020-03-15 LAB
ACID FAST STN SPT: NORMAL
ACID FAST STN SPT: NORMAL

## 2020-03-15 NOTE — CONSULT LETTER
[Dear  ___] : Dear  [unfilled], [Consult Letter:] : I had the pleasure of evaluating your patient, [unfilled]. [Consult Closing:] : Thank you very much for allowing me to participate in the care of this patient.  If you have any questions, please do not hesitate to contact me. [Sincerely,] : Sincerely, [FreeTextEntry2] : Dr. Eulalio Allison [FreeTextEntry3] : \par Afshan Stout MD\par  of Medicine\par Division of Infectious Diseases\par [facility]\par [address] [place] [address] DrShreya\[place] [address]\par Tel: [phone]\par Fax: [phone]

## 2020-03-15 NOTE — ASSESSMENT
[FreeTextEntry1] : This is a 70 yo F with extensive medical history notable for abnormal CT chest with nodules, persistent severe asthma steroid dependent, tracheobronchomalacia s/p tracheoplasty, AFIB on Eliquis,  who presented for recurrent respiratory infections and new MRSA boil in 1/2020   Boil has since resolved.\par \par Pt now returns b/c she underwent bronchoscopy and BAL culture grew Pseudomonas.  She was scheduled for a 2nd bronch but this is on hold until pseudomonas is treated.  Her isolate is resistant to quinolones and she also developed muscle aches/joint pain while on cipro.   She gets short of breath with penicillins.  \par \par Continue aztreonam 2 grams iv q8 for pseudomonal respiratory infection x 3 weeks. To end 3/19/2020.\par Will f/up final sputum results from january. 1 AFB isolate is growing DIEUDONNE.  \par \par She should continue with mucus clearing devices (Acapella and advised she go back to chest vest if she can). \par \par I will check CBC and CMP weekly while on aztreonam. Labs have been normal to date. \par Repeat sputum today. \par \par

## 2020-03-15 NOTE — HISTORY OF PRESENT ILLNESS
[FreeTextEntry1] : 70 yo F presents today for recurrent infections.  Found to have pseudomonal infection on recent bronchoscopy 2/14/2020.  \par \par She has had worsening cough. Thought to be from tracheobronchomalacia.  Underwent bronch with laser 2/14/2020. BAL grew pseudomonas.  Supposed to have 2nd bronch/laser 3/6/2020 now postponed due to infection.  \par \par Cough is worse now. No fevers.  +short of breath. \par Has allergy to penicillin. Now on IV aztreonam x 2 weeks for pseudomonas via port.  \par \par PMH:\par Asthma (severe persistent, steroid dependent)\par Tracheobronchomalacia s/p tracheobronchoplasty 2018\par Adrenal insufficiency - on steroids >50 years  Takes Medrol 12 mg am and 12  mg pm. \par Diabetes\par Afib\par Aortic insufficiency\par Colorectoal Cancer 2017 s/p resection and chemo/xrt with diverting ostomy\par Pinched nerve in neck\par  MRSA boil on Right lower abdomen. 12/2019\par \par She was referred to immunology b/c of recurrent infections.  T cell count is low.  Mumps protection weaned.  Found to have MGUS, IgA monoclonal gammopathy.   \par \par For past few months being treated for numerous pulmonary infections.\par CT chest is abnormal.\par Had bronchoscopy\par Feels the tracheomalacia is possibly worsening causing her SOB. \par Feels SOB but can sleep on one pillow.\par Sometimes when she walks can't take deep breath and has to stop and bend over. \par Recently hospitalized with influenza 12/7/19-12/12/19\par Sputum from 12/31/19 grew pseudomonas aeruginosa.  Treated with cipro but had to stop after 1 week b/c developed ankle pain/swelling. \par \par Pt also reports that she has been seen at [facility] >30 years ago .  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 \par \par Did take tobramycin for about one month for h/o pseudomonas.\par \par Using acapella\par Has vest but she's not using it. Is heavy and feels it doesn't help her. \par ROS: Tired, frustrated plus see above. \par \par 10/22/19: CT chest:\par Tracheobronchomalacia\par No new enlarging pulmonary nodules\par Multiple sclerotic bone lesions in vertebral bodies.

## 2020-03-15 NOTE — PHYSICAL EXAM
[General Appearance - Alert] : alert [General Appearance - In No Acute Distress] : in no acute distress [Sclera] : the sclera and conjunctiva were normal [PERRL With Normal Accommodation] : pupils were equal in size, round, reactive to light [Neck Appearance] : the appearance of the neck was normal [] : the neck was supple [Heart Rate And Rhythm] : heart rate was normal and rhythm regular [Heart Sounds] : normal S1 and S2 [Heart Sounds Gallop] : no gallops [Murmurs] : no murmurs [Heart Sounds Pericardial Friction Rub] : no pericardial rub [Bowel Sounds] : normal bowel sounds [Abdomen Soft] : soft [No Palpable Adenopathy] : no palpable adenopathy [Skin Lesions] : no skin lesions [No Focal Deficits] : no focal deficits [Oriented To Time, Place, And Person] : oriented to person, place, and time [Affect] : the affect was normal [FreeTextEntry1] : + wheezing

## 2020-03-16 ENCOUNTER — APPOINTMENT (OUTPATIENT)
Dept: PEDIATRIC ALLERGY IMMUNOLOGY | Facility: CLINIC | Age: 70
End: 2020-03-16

## 2020-03-17 ENCOUNTER — APPOINTMENT (OUTPATIENT)
Dept: INFECTIOUS DISEASE | Facility: CLINIC | Age: 70
End: 2020-03-17

## 2020-03-17 ENCOUNTER — APPOINTMENT (OUTPATIENT)
Dept: CARDIOLOGY | Facility: CLINIC | Age: 70
End: 2020-03-17

## 2020-04-09 LAB — FUNGUS SPT CULT: ABNORMAL

## 2020-04-21 NOTE — PROGRESS NOTE ADULT - PROBLEM/PLAN-4
DISPLAY PLAN FREE TEXT
21-Apr-2020 12:47

## 2020-04-30 LAB — ACID FAST STN SPT: NORMAL

## 2020-05-05 ENCOUNTER — APPOINTMENT (OUTPATIENT)
Dept: HEART AND VASCULAR | Facility: CLINIC | Age: 70
End: 2020-05-05
Payer: MEDICARE

## 2020-05-05 DIAGNOSIS — B02.9 ZOSTER W/OUT COMPLICATIONS: ICD-10-CM

## 2020-05-05 PROCEDURE — 99213 OFFICE O/P EST LOW 20 MIN: CPT | Mod: 95

## 2020-05-13 ENCOUNTER — OUTPATIENT (OUTPATIENT)
Dept: OUTPATIENT SERVICES | Facility: HOSPITAL | Age: 70
LOS: 1 days | End: 2020-05-13

## 2020-05-13 DIAGNOSIS — Z98.89 OTHER SPECIFIED POSTPROCEDURAL STATES: Chronic | ICD-10-CM

## 2020-05-13 DIAGNOSIS — K62.5 HEMORRHAGE OF ANUS AND RECTUM: Chronic | ICD-10-CM

## 2020-05-13 DIAGNOSIS — C21.1 MALIGNANT NEOPLASM OF ANAL CANAL: ICD-10-CM

## 2020-05-13 DIAGNOSIS — Z98.890 OTHER SPECIFIED POSTPROCEDURAL STATES: Chronic | ICD-10-CM

## 2020-05-13 DIAGNOSIS — H05.352 EXOSTOSIS OF LEFT ORBIT: Chronic | ICD-10-CM

## 2020-05-13 DIAGNOSIS — Z87.09 PERSONAL HISTORY OF OTHER DISEASES OF THE RESPIRATORY SYSTEM: Chronic | ICD-10-CM

## 2020-05-13 DIAGNOSIS — Z96.653 PRESENCE OF ARTIFICIAL KNEE JOINT, BILATERAL: Chronic | ICD-10-CM

## 2020-05-14 ENCOUNTER — OUTPATIENT (OUTPATIENT)
Dept: OUTPATIENT SERVICES | Facility: HOSPITAL | Age: 70
LOS: 1 days | Discharge: HOME | End: 2020-05-14

## 2020-05-14 DIAGNOSIS — Z98.890 OTHER SPECIFIED POSTPROCEDURAL STATES: Chronic | ICD-10-CM

## 2020-05-14 DIAGNOSIS — K62.5 HEMORRHAGE OF ANUS AND RECTUM: Chronic | ICD-10-CM

## 2020-05-14 DIAGNOSIS — Z98.89 OTHER SPECIFIED POSTPROCEDURAL STATES: Chronic | ICD-10-CM

## 2020-05-14 DIAGNOSIS — H05.352 EXOSTOSIS OF LEFT ORBIT: Chronic | ICD-10-CM

## 2020-05-14 DIAGNOSIS — Z87.09 PERSONAL HISTORY OF OTHER DISEASES OF THE RESPIRATORY SYSTEM: Chronic | ICD-10-CM

## 2020-05-14 DIAGNOSIS — Z96.653 PRESENCE OF ARTIFICIAL KNEE JOINT, BILATERAL: Chronic | ICD-10-CM

## 2020-05-14 DIAGNOSIS — C18.9 MALIGNANT NEOPLASM OF COLON, UNSPECIFIED: ICD-10-CM

## 2020-05-19 ENCOUNTER — APPOINTMENT (OUTPATIENT)
Dept: HEMATOLOGY ONCOLOGY | Facility: CLINIC | Age: 70
End: 2020-05-19
Payer: MEDICARE

## 2020-05-19 PROCEDURE — 99214 OFFICE O/P EST MOD 30 MIN: CPT | Mod: 95

## 2020-05-19 NOTE — REVIEW OF SYSTEMS
[Fatigue] : fatigue [SOB on Exertion] : shortness of breath during exertion [Anxiety] : anxiety [Negative] : Allergic/Immunologic [FreeTextEntry2] : chronic mild [FreeTextEntry6] : chronic emphysema/copd

## 2020-05-19 NOTE — HISTORY OF PRESENT ILLNESS
[Home] : at home, [unfilled] , at the time of the visit. [Medical Office: ([facility])___] : at the medical office located in  [Patient] : the patient [Self] : self [Disease:__________________________] : Disease: [unfilled] [de-identified] : Jan 2020\par 68 yo female with a hx of GI malignancy - CRC dx in 2017 s/p chemo/RT, has chronic ostomy, diabetes, afib, tracheobronchomalacia, adrenal insufficiency on chronic steroids, severe Asthma, hx of recurrent infections. Pt recently seen by Immunologist Dr Carmichael and informed of possibl ediagnosis of multiple myeloma due to e/o elevated IgA level on labs and IgA kappa paraprotein on serum immunofixation. Pt has no SLiM- CRAB synptoms or any e/o myeloma defining events on labs at this time. She reports recently told she has pseudomonas infection being followed closely by ID. She also has chronic neuropathy in legs unclear is symptoms due to diabetic neuropathy vs lumbosacral spinal stenosis. Her last CT imaging in Oct 2019 showed no lytic lesions in the bones. \par She is here for initial evaluation to assess for possible plasma cell dyscrasia. On lab review M spike was 0.1g in Jan 2, 2020, IgA level was 558 with normal SFLC levels and nl SFLC ratio. She is currently on ciprofloxacin for +pseudomonas in sputum cx. She is in usual state of chronically ill health, overall with good disposition.  [de-identified] : low-int risk [de-identified] : Pt is worried about traveling out of house due to COVID-19 pandemic. She has been following with her doctors via telehealth. Approx. 3 weeks ago she developed a zoster outbreak, which responded to valtrex prescribed by her pcp. She denies any other recent infectious issues. She is no longer on antibiotics for pseudomonal infection, last sputum cx done by pulm was negative for infection. Pt has home mediport flush arranged monthly, next scheduled in 1 week. She is overdue for labs. She is requested COVID-19 testing with labs.

## 2020-06-01 ENCOUNTER — APPOINTMENT (OUTPATIENT)
Dept: HEART AND VASCULAR | Facility: CLINIC | Age: 70
End: 2020-06-01
Payer: MEDICARE

## 2020-06-01 DIAGNOSIS — R50.9 FEVER, UNSPECIFIED: ICD-10-CM

## 2020-06-01 PROCEDURE — 99441: CPT | Mod: 95

## 2020-06-02 ENCOUNTER — APPOINTMENT (OUTPATIENT)
Dept: PULMONOLOGY | Facility: CLINIC | Age: 70
End: 2020-06-02
Payer: MEDICARE

## 2020-06-02 DIAGNOSIS — Z11.59 ENCOUNTER FOR SCREENING FOR OTHER VIRAL DISEASES: ICD-10-CM

## 2020-06-02 PROCEDURE — 99443: CPT | Mod: 95

## 2020-06-02 NOTE — REASON FOR VISIT
[Asthma] : asthma [COPD] : COPD [Home] : at home, [unfilled] , at the time of the visit. [Medical Office: ([facility])___] : at the medical office located in  [Verbal consent obtained from patient] : the patient, [unfilled] [Acute] : an acute visit [Cough] : cough

## 2020-06-03 NOTE — REVIEW OF SYSTEMS
[Fever] : fever [Chills] : chills [Cough] : cough [Sputum] : sputum [Vomiting] : vomiting [Chronic Pain] : chronic pain [Negative] : Endocrine [Fatigue] : no fatigue [Poor Appetite] : no poor appetite [Nasal Congestion] : no nasal congestion [Sore Throat] : no sore throat [Postnasal Drip] : no postnasal drip [Chest Tightness] : no chest tightness [Dyspnea] : no dyspnea [Sinus Problems] : no sinus problems [Wheezing] : no wheezing [SOB on Exertion] : no sob on exertion [Diarrhea] : no diarrhea [GERD] : no gerd [Nausea] : no nausea [Headache] : no headache [Dizziness] : no dizziness

## 2020-06-03 NOTE — ASSESSMENT
[FreeTextEntry1] : The plan for the patient is as follows:\par \par 1. Asthma, severe persistent:\par - Continue Medrol from 4 mg PO daily. \par - continue to use albuterol via the nebulizer BID PRN\par - continue Perforomist via the nebulizer BID\par - continue Budesonide 0.5% via the nebulizer BID \par - continue to use Spiriva 1 inhalation QD.\par \par 2. Screening for Viral Disease:\par - Contacted Adeel (pt's ) from [facility]. Adeel confirmed they have the capability for Covid-19 NP swab. Verbal order given for Covid-19 NP Swab d/t patient's fever and cough. \par \par 3. Chronic cough - now productive of yellow-green sputum:\par -  Contacted Adeel (pt's ) from [facility]. Adeel confirmed they have the capability for sputum culture. Verbal order given for sputum culture d/t patient's productive cough of yellow-green sputum. \par - continue to use acapella device multiple times daily\par - recommended to use chest vest therapy multiple times daily; hasn't been using because she has had \par \par 4. Chronic GERD:\par - Continue to use Protonix 40 mg before breakfast\par - Continue Baclofen 5 mg pre-meal and QHS.\par \par Advised patient that I agree with her PCP's recommendations of being seen at an Urgent care for further evaluation.  Patient strongly declines stating, "I am not leaving my home". \par \par Called patient to confirm that I spoke with her , Adeel, from [facility] and I placed verbal orders with him in regards to further evaluation of concerns. \par \par Patient to continue regimen as discussed with Dr. Allison.\par Patient encouraged to call with further questions or concerns.\par Patient verbalizes understanding.

## 2020-06-03 NOTE — PHYSICAL EXAM
[Oriented x3] : oriented x3 [Normal Mood] : normal mood [Normal Affect] : normal affect [TextBox_11] : Unable to assess d/t telephonic visit.  [TextBox_2] : Unable to assess d/t telephonic visit.  [TextBox_44] : Unable to assess d/t telephonic visit.  [TextBox_68] : Unable to assess d/t telephonic visit.  [TextBox_54] : Unable to assess d/t telephonic visit.  [TextBox_80] : Unable to assess d/t telephonic visit.  [TextBox_99] : Unable to assess d/t telephonic visit.  [TextBox_105] : Unable to assess d/t telephonic visit.  [TextBox_125] : Unable to assess d/t telephonic visit.  [TextBox_132] : Unable to assess d/t telephonic visit.

## 2020-06-04 LAB
ALBUMIN SERPL ELPH-MCNC: 4.1 G/DL
ALP BLD-CCNC: 97 U/L
ALT SERPL-CCNC: 17 U/L
ANION GAP SERPL CALC-SCNC: 21 MMOL/L
AST SERPL-CCNC: 27 U/L
BASOPHILS # BLD AUTO: 0.02 K/UL
BASOPHILS NFR BLD AUTO: 0.3 %
BILIRUB SERPL-MCNC: 0.4 MG/DL
BUN SERPL-MCNC: 10 MG/DL
CALCIUM SERPL-MCNC: 9.5 MG/DL
CHLORIDE SERPL-SCNC: 99 MMOL/L
CO2 SERPL-SCNC: 21 MMOL/L
CREAT SERPL-MCNC: 0.86 MG/DL
EOSINOPHIL # BLD AUTO: 0.13 K/UL
EOSINOPHIL NFR BLD AUTO: 1.9 %
GLUCOSE SERPL-MCNC: 108 MG/DL
HCT VFR BLD CALC: 43.3 %
HGB BLD-MCNC: 12.7 G/DL
IMM GRANULOCYTES NFR BLD AUTO: 0.6 %
LYMPHOCYTES # BLD AUTO: 1.59 K/UL
LYMPHOCYTES NFR BLD AUTO: 23.6 %
MAN DIFF?: NORMAL
MCHC RBC-ENTMCNC: 23.6 PG
MCHC RBC-ENTMCNC: 29.3 GM/DL
MCV RBC AUTO: 80.6 FL
MONOCYTES # BLD AUTO: 0.78 K/UL
MONOCYTES NFR BLD AUTO: 11.6 %
NEUTROPHILS # BLD AUTO: 4.17 K/UL
NEUTROPHILS NFR BLD AUTO: 62 %
PLATELET # BLD AUTO: 295 K/UL
POTASSIUM SERPL-SCNC: 4.6 MMOL/L
PROT SERPL-MCNC: 6.8 G/DL
RBC # BLD: 5.37 M/UL
RBC # FLD: 15.4 %
SARS-COV-2 IGG SERPL IA-ACNC: <3.8 AU/ML
SARS-COV-2 IGG SERPL QL IA: NEGATIVE
SODIUM SERPL-SCNC: 142 MMOL/L
WBC # FLD AUTO: 6.73 K/UL

## 2020-06-06 LAB
ALBUMIN MFR SERPL ELPH: 50.4 %
ALBUMIN SERPL-MCNC: 3.4 G/DL
ALBUMIN/GLOB SERPL: 1 RATIO
ALPHA1 GLOB MFR SERPL ELPH: 6.5 %
ALPHA1 GLOB SERPL ELPH-MCNC: 0.4 G/DL
ALPHA2 GLOB MFR SERPL ELPH: 13.7 %
ALPHA2 GLOB SERPL ELPH-MCNC: 0.9 G/DL
B-GLOBULIN MFR SERPL ELPH: 19.7 %
B-GLOBULIN SERPL ELPH-MCNC: 1.3 G/DL
DEPRECATED KAPPA LC FREE/LAMBDA SER: 1.7 RATIO
GAMMA GLOB FLD ELPH-MCNC: 0.7 G/DL
GAMMA GLOB MFR SERPL ELPH: 9.7 %
IGA SER QL IEP: 549 MG/DL
IGG SER QL IEP: 577 MG/DL
IGM SER QL IEP: 106 MG/DL
INTERPRETATION SERPL IEP-IMP: NORMAL
KAPPA LC CSF-MCNC: 1.38 MG/DL
KAPPA LC SERPL-MCNC: 2.35 MG/DL
M PROTEIN MFR SERPL ELPH: 6.3 %
M PROTEIN SPEC IFE-MCNC: NORMAL
MONOCLON BAND OBS SERPL: 0.4 G/DL
PROT SERPL-MCNC: 6.8 G/DL
PROT SERPL-MCNC: 6.8 G/DL

## 2020-06-18 PROBLEM — D72.810 LYMPHOCYTOPENIA: Status: ACTIVE | Noted: 2020-02-07

## 2020-06-24 ENCOUNTER — APPOINTMENT (OUTPATIENT)
Dept: PULMONOLOGY | Facility: CLINIC | Age: 70
End: 2020-06-24
Payer: MEDICARE

## 2020-06-24 VITALS
DIASTOLIC BLOOD PRESSURE: 70 MMHG | SYSTOLIC BLOOD PRESSURE: 120 MMHG | HEIGHT: 65 IN | RESPIRATION RATE: 16 BRPM | HEART RATE: 69 BPM | BODY MASS INDEX: 23.66 KG/M2 | WEIGHT: 142 LBS | TEMPERATURE: 97 F | OXYGEN SATURATION: 97 %

## 2020-06-24 PROCEDURE — 99214 OFFICE O/P EST MOD 30 MIN: CPT | Mod: 25

## 2020-06-24 PROCEDURE — 71046 X-RAY EXAM CHEST 2 VIEWS: CPT

## 2020-06-24 NOTE — ADDENDUM
[FreeTextEntry1] : Documented by Ralph Kwan acting as a scribe for Dr. Eulalio Allison on 06/24/2020.\par \par All medical record entries made by the Scribe were at my, Dr. Eulalio Allison's, direction and personally dictated by me on 06/24/2020. I have reviewed the chart and agree that the record accurately reflects my personal performance of the history, physical exam, assessment and plan. I have also personally directed, reviewed, and agree with the discharge instructions.

## 2020-06-24 NOTE — PROCEDURE
[FreeTextEntry1] : CXR reveals a normal sized heart; port on the right and no evidence of infiltrate or effusion

## 2020-06-24 NOTE — ASSESSMENT
[FreeTextEntry1] : Ms. Bloom is a 69 year old female with a history of rectal CA, AVDz, asthma, allergy, GERD, TBM, s/p pneumonia, who presents recent mm -awaiting Dx/Rx \par \par Her chronic SOB which is multifactorial due to:\par - tracheomalacia (s/p tracheoplasty with residual frequent mucus production, though patient is non-compliant with vest therapy)\par - chronic bronchitis\par - asthma\par - allergic rhinitis\par - GERD\par - poor breathing mechanics \par - CAD/AV disease\par \par \par problem 1a: severe persistent asthma -(steroid dependent) - active\par - continue Medrol 8 mg qd - Boost to Medrol 8 mg BID x 10 days\par -continue to use albuterol via the nebulizer QID \par -followed by Mucomyst QiD\par -followed by the acapella device/ chest vest therapy \par -followed by Perforomist via the nebulizer BID\par -followed by Budesonide 0.5% via the nebulizer BID \par -continue to use Breo Ellipta 200 at 1 inhalation QD \par -continue to use Spiriva 1 inhalation QD\par -failed Xolair 225 injection; follow up injections every 2 weeks - Dupixent initiated (12.3.19) - to continue \par -continue to use Accolate 20 mg BID\par -Information sheet given about prednisone to the patient to be reviewed, this medication is never to be used without consulting the prescribing physician. Proper dietary restraint is necessary specifically salt containing foods, if any reaction may occur should be reported. \par -Asthma is believed to be caused by inherited (genetic) and environmental factor, but its exact cause is unknown. Asthma may be triggered by allergens, lung infections, or irritants in the air. Asthma triggers are different for each person\par -Inhaler technique reviewed as well as oral hygiene techniques reviewed with patient. Avoidance of cold air, extremes of temperature, rescue inhaler should be used before exercise. Order of medication reviewed with patient. Recommended use of a cool mist humidifier in the bedroom. \par \par problem 2: tracheomalacia, residual bronchomalacia \par -s/p tracheoplasty with Dr. Mt Zapien\par -continue to follow up \par -s/p f/u Dynamic chest CT 10/2019 positive w TBM\par \par problem 3: chronic bronchitis and mucus clearance\par -continue to use acapella device multiple times daily\par -recommended to use chest vest therapy multiple times daily \par -she is being sent for sputum cultures \par Patient has a chronic cough greater than 6 months, tried and failed manual chest physiotherapy at home, no skilled caregiver available at home to perform manual CPT, tried and failed acapella vibratory physiotherapy, and recommended chest vest therapy \par \par Problem 3A: Multiple infections\par -Continue Tobramycin BID for 1 month (completed 12/20/19)\par -Complete follow up Sputum culture after completing ABx\par \par Probelm 3B: multi myeloma\par -appointment  on 1/28/2020\par \par problem 4: GERD\par -continue to use Protonix 40 mg before breakfast\par -continue Baclofen 10 mg q-meal\par -Rule of 2s: avoid eating too much, eating too late, eating too spicy, eating two hours before bed\par -Things to avoid including overeating, spicy foods, tight clothing, eating within three hours of bed, this list is not all inclusive. \par -For treatment of reflux, possible options discussed including diet control, H2 blockers, PPIs, as well as coating motility agents discussed as treatment options. Timing of meals and proximity of last meal to sleep were discussed. If symptoms persist, a formal gastrointestinal evaluation is needed. \par \par problem 5: allergic rhinitis \par -continue to use nasal saline\par -continue to use Xyzal 5 mg before bed\par -Environmental measures for allergies were encouraged including mattress and pillow cover, air purifier, and environmental controls. \par \par problem 6: hx of abnormal aortic valve / cardiac health\par -continue to follow up with Dr. Leahy, AV Disease, AF\par -echocadiogram in June 2018  (Tosin); Kale Tristan for new visit \par \par problem 7: colon cancer\par -s/p radiation therapy, surgery \par -continue to use chemotherapy follow up with Dr. King or Dr. Mario\par \par problem 8: poor breathing mechanics\par -Proper breathing techniques were reviewed with an emphasis of exhalation. Patient instructed to breath in for 1 second and out for four seconds. Patient was encouraged to not talk while walking.\par \par problem 9: immunodeficiency\par - Due to the fact that this pt has had more infections than would be expected and immunological blood work is indicated this would include: IgG subclasses, quantitative immunoglobulins, Strep pneumoniae titers as well as Vitamin D levels. Based on this blood work we will be able to decide where the pt needs additional pneumococcal vaccine either Prevnar 13 or pneumovax. Immunology evaluation will also be potentially indicated.\par \par problem 10: r/o immunodeficiency (on Medrol)\par -Due to the fact that this pt has had more infections than would be expected and immunological blood work is indicated this would include: IgG subclasses, quantitative immunoglobulins, Strep pneumoniae titers as well as Vitamin D levels.\par -Based on this blood work we will be able to decide where the pt needs additional pneumococcal vaccine either Prevnar 13 or pneumovax. Immunology evaluation will also be potentially indicated. \par \par problem 11: abnormal chest CT- ? new nodule- likely inflammation \par - F/u PET/CT 4/2019- if changed Bx (Rupali); follow up and re-evaluate as per Rupali\par -questionable DIEUDONNE or HERMELINDO, all sputum is negative \par -CAT scans are the only radiological modality to identify abnormalities w/in the lungs with regards to nodules/masses/lymph nodes. Risks, benefits were reviewed in detail. The guidelines for abnormalities include follow up CT scans at various intervals which could range from 6 weeks to 1 year intervals. If there is a change for the worse then consideration for a biopsy will be considered if you are a candidate. Second opinion evaluation with a thoracic surgeon or an interventional radiologist could be offered. 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 It sometimes develops following a URI, herpes zoster outbreak in pharynx or thyroid or cervical spine injury. However, many patients have no identifiable antecedent explanation. \par \par Problem 13: Health Maintenance/COVID19 Precautions \par - Clean your hands often. Wash your hands often with soap and water for at least 20 seconds, especially after blowing your nose, coughing, or sneezing, or having been in a public place.\par - If soap and water are not available, use a hand  that contains at least 60% alcohol.\par - To the extent possible, avoid touching high-touch surfaces in public places - elevator buttons, door handles, handrails, handshaking with people, etc. Use a tissue or your sleeve to cover your hand or finger if you must touch something.\par - Wash your hands after touching surfaces in public places.\par - Avoid touching your face, nose, eyes, etc.\par - Clean and disinfect your home to remove germs: practice routine cleaning of frequently touched surfaces (for example: tables, doorknobs, light switches, handles, desks, toilets, faucets, sinks & cell phones)\par - Avoid crowds, especially in poorly ventilated spaces. Your risk of exposure to respiratory viruses like COVID-19 may increase in crowded, closed-in settings with little air circulation if there are people in the crowd who are sick. All patients are recommended to practice social distancing and stay at least 6 feet away from others. \par - Avoid all non-essential travel including plane trips, and especially avoid embarking on cruise ships.\par -If COVID-19 is spreading in your community, take extra measures to put distance between yourself and other people to further reduce your risk of being exposed to this new virus.\par -Stay home as much as possible.\par - Consider ways of getting food brought to your house through family, social, or commercial networks\par -Be aware that the virus has been known to live in the air up to 3 hours post exposure, cardboard up to 24 hours post exposure, copper up to 4 hours post exposure, steel and plastic up to 2-3 days post exposure. Risk of transmission from these surfaces are affected by many variables.\par COVID-19 precautionary Immune Support Recommendations:\par -OTC Vitamin C 500mg BID \par -OTC Quercetin 250-500mg BID \par -OTC Zinc 75-100mg per day \par -OTC Melatonin 1 or 2mg a night \par -OTC Vitamin D 1-4000mg per day \par -OTC Tonic Water 8oz per day\par -Water 0.5-1 gallon per day\par Asthma and COVID19:\par You need to make sure your asthma is under control. This often requires the use of inhaled corticosteroids (and sometimes oral corticosteroids). Inhaled corticosteroids do not likely reduce your immune system’s ability to fight infections, but oral corticosteroids may. It is important to use the steps above to protect yourself to limit your exposure to any respiratory virus. \par \par problem 14:  health maintenance \par -recommended yearly flu shot after October 15 (2019)\par -recommended strep pneumonia vaccines: Prevnar-13 vaccine, followed by Pneumo vaccine 23 one year following\par -recommended early intervention for URIs\par -recommended regular osteoporosis evaluations\par -recommended early dermatological evaluations\par -recommended after the age of 50 to the age of 70, colonoscopy every 5 years \par \par F/U in 6 weeks - SPI / NIOX\par She is encouraged to call with any changes, concerns, or questions.

## 2020-06-24 NOTE — PHYSICAL EXAM
[General Appearance - Well Developed] : well developed [Normal Appearance] : normal appearance [Well Groomed] : well groomed [General Appearance - Well Nourished] : well nourished [No Deformities] : no deformities [General Appearance - In No Acute Distress] : no acute distress [Eyelids - No Xanthelasma] : the eyelids demonstrated no xanthelasmas [Normal Conjunctiva] : the conjunctiva exhibited no abnormalities [Normal Oropharynx] : normal oropharynx [II] : II [Neck Appearance] : the appearance of the neck was normal [Neck Cervical Mass (___cm)] : no neck mass was observed [Thyroid Nodule] : there were no palpable thyroid nodules [Jugular Venous Distention Increased] : there was no jugular-venous distention [Thyroid Diffuse Enlargement] : the thyroid was not enlarged [Heart Sounds] : normal S1 and S2 [Heart Rate And Rhythm] : heart rate and rhythm were normal [Respiration, Rhythm And Depth] : normal respiratory rhythm and effort [Exaggerated Use Of Accessory Muscles For Inspiration] : no accessory muscle use [Abdomen Tenderness] : non-tender [Kyphosis] : kyphosis [Abdomen Soft] : soft [Abdomen Mass (___ Cm)] : no abdominal mass palpated [Abnormal Walk] : normal gait [Gait - Sufficient For Exercise Testing] : the gait was sufficient for exercise testing [Cyanosis, Localized] : no localized cyanosis [Nail Clubbing] : no clubbing of the fingernails [Skin Color & Pigmentation] : normal skin color and pigmentation [Petechial Hemorrhages (___cm)] : no petechial hemorrhages [] : no rash [No Venous Stasis] : no venous stasis [No Skin Ulcers] : no skin ulcer [Skin Lesions] : no skin lesions [Deep Tendon Reflexes (DTR)] : deep tendon reflexes were 2+ and symmetric [No Xanthoma] : no  xanthoma was observed [Sensation] : the sensory exam was normal to light touch and pinprick [No Focal Deficits] : no focal deficits [Impaired Insight] : insight and judgment were intact [Oriented To Time, Place, And Person] : oriented to person, place, and time [Affect] : the affect was normal [FreeTextEntry1] : I:E ratio 1:3, expiratory wheeze b/l

## 2020-06-24 NOTE — HISTORY OF PRESENT ILLNESS
[FreeTextEntry1] : Ms. Bloom is a 69 year old female with a history of abnormal CXR/chest CT, allergic rhinitis, severe persistent asthma, bronchiectasis, GERD, COPD, recurrent PNA, PND, s/p tracheoplasty, TBM, and SOB presenting to the office today for a follow up visit. Her chief complaint is KRAMER.\par -she has 8 flights of stairs to get into her house, she stops and rests on her way up and feels the same walking down the hallway\par -she sleeps on one pillow and "can't breathe"\par -she feels that she is SOB and is not wheezing more\par -she is nauseous and she has been checking her temp. BID and has been 99.1-99.3 F\par    - the other day she has temp 102.2 and vomitted\par -she has increased albuterol use to TIB, "about mid day I start feeling uncomfortable"\par -"the sputum is white"\par -she put on 4 lbs \par -denies ankle/leg swelling \par -energy level "not so hot"\par -she is f/u with her doctor for shoulder pain\par -today she denies any headaches, nausea, vomiting, fever, chills, sweats, chest pain, chest pressure, diarrhea, constipation, dysphagia, dizziness, leg swelling, leg pain, itchy eyes, itchy ears, heartburn, reflux, or sour taste in the mouth.

## 2020-07-13 ENCOUNTER — RESULT REVIEW (OUTPATIENT)
Age: 70
End: 2020-07-13

## 2020-07-13 ENCOUNTER — APPOINTMENT (OUTPATIENT)
Dept: ULTRASOUND IMAGING | Facility: IMAGING CENTER | Age: 70
End: 2020-07-13
Payer: MEDICARE

## 2020-07-13 ENCOUNTER — APPOINTMENT (OUTPATIENT)
Dept: MAMMOGRAPHY | Facility: IMAGING CENTER | Age: 70
End: 2020-07-13

## 2020-07-13 ENCOUNTER — OUTPATIENT (OUTPATIENT)
Dept: OUTPATIENT SERVICES | Facility: HOSPITAL | Age: 70
LOS: 1 days | End: 2020-07-13
Payer: MEDICARE

## 2020-07-13 DIAGNOSIS — Z98.89 OTHER SPECIFIED POSTPROCEDURAL STATES: Chronic | ICD-10-CM

## 2020-07-13 DIAGNOSIS — C21.1 MALIGNANT NEOPLASM OF ANAL CANAL: ICD-10-CM

## 2020-07-13 DIAGNOSIS — Z96.653 PRESENCE OF ARTIFICIAL KNEE JOINT, BILATERAL: Chronic | ICD-10-CM

## 2020-07-13 DIAGNOSIS — Z98.890 OTHER SPECIFIED POSTPROCEDURAL STATES: Chronic | ICD-10-CM

## 2020-07-13 DIAGNOSIS — Z87.09 PERSONAL HISTORY OF OTHER DISEASES OF THE RESPIRATORY SYSTEM: Chronic | ICD-10-CM

## 2020-07-13 DIAGNOSIS — H05.352 EXOSTOSIS OF LEFT ORBIT: Chronic | ICD-10-CM

## 2020-07-13 DIAGNOSIS — K62.5 HEMORRHAGE OF ANUS AND RECTUM: Chronic | ICD-10-CM

## 2020-07-13 PROCEDURE — G0279: CPT | Mod: 26

## 2020-07-13 PROCEDURE — 77066 DX MAMMO INCL CAD BI: CPT | Mod: 26

## 2020-07-13 PROCEDURE — 76642 ULTRASOUND BREAST LIMITED: CPT | Mod: 26,50

## 2020-07-13 PROCEDURE — G0279: CPT

## 2020-07-13 PROCEDURE — 77066 DX MAMMO INCL CAD BI: CPT

## 2020-07-13 PROCEDURE — 76642 ULTRASOUND BREAST LIMITED: CPT

## 2020-07-16 NOTE — PHYSICAL EXAM
[General Appearance - Well Developed] : well developed [Well Groomed] : well groomed [General Appearance - Well Nourished] : well nourished [No Deformities] : no deformities [General Appearance - In No Acute Distress] : no acute distress [Normal Conjunctiva] : the conjunctiva exhibited no abnormalities [Eyelids - No Xanthelasma] : the eyelids demonstrated no xanthelasmas [Normal Oral Mucosa] : normal oral mucosa [No Oral Pallor] : no oral pallor [No Oral Cyanosis] : no oral cyanosis [Normal Jugular Venous A Waves Present] : normal jugular venous A waves present [Normal Jugular Venous V Waves Present] : normal jugular venous V waves present [No Jugular Venous Espino A Waves] : no jugular venous espino A waves [Respiration, Rhythm And Depth] : normal respiratory rhythm and effort [Exaggerated Use Of Accessory Muscles For Inspiration] : no accessory muscle use [Auscultation Breath Sounds / Voice Sounds] : lungs were clear to auscultation bilaterally [Heart Rate And Rhythm] : heart rate and rhythm were normal [Heart Sounds] : normal S1 and S2 [Edema] : no peripheral edema present [Systolic grade ___/6] : A grade [unfilled]/6 systolic murmur was heard. [Bowel Sounds] : normal bowel sounds [Abdomen Tenderness] : non-tender [Abnormal Walk] : normal gait [Gait - Sufficient For Exercise Testing] : the gait was sufficient for exercise testing [Nail Clubbing] : no clubbing of the fingernails [Cyanosis, Localized] : no localized cyanosis [Petechial Hemorrhages (___cm)] : no petechial hemorrhages No [Nail Splinter Hemorrhages] : no splinter hemorrhages of the nails [Fingers Osler's Nodes] : Osler's nodes were not seenon the fingers [Skin Color & Pigmentation] : normal skin color and pigmentation [] : no rash [No Venous Stasis] : no venous stasis [Skin Lesions] : no skin lesions [No Skin Ulcers] : no skin ulcer [No Xanthoma] : no  xanthoma was observed [FreeTextEntry1] : mildly decreased skin turgor [Oriented To Time, Place, And Person] : oriented to person, place, and time [Impaired Insight] : insight and judgment were intact [Affect] : the affect was normal [Mood] : the mood was normal [Memory Recent] : recent memory was not impaired [Memory Remote] : remote memory was not impaired

## 2020-08-12 ENCOUNTER — APPOINTMENT (OUTPATIENT)
Dept: PULMONOLOGY | Facility: CLINIC | Age: 70
End: 2020-08-12
Payer: MEDICARE

## 2020-08-12 VITALS
RESPIRATION RATE: 16 BRPM | DIASTOLIC BLOOD PRESSURE: 70 MMHG | HEART RATE: 98 BPM | SYSTOLIC BLOOD PRESSURE: 140 MMHG | TEMPERATURE: 97 F | BODY MASS INDEX: 23.9 KG/M2 | HEIGHT: 64 IN | WEIGHT: 140 LBS | OXYGEN SATURATION: 94 %

## 2020-08-12 LAB — SARS-COV-2 N GENE NPH QL NAA+PROBE: NOT DETECTED

## 2020-08-12 PROCEDURE — 99214 OFFICE O/P EST MOD 30 MIN: CPT | Mod: 25

## 2020-08-12 PROCEDURE — 94729 DIFFUSING CAPACITY: CPT

## 2020-08-12 PROCEDURE — 94010 BREATHING CAPACITY TEST: CPT

## 2020-08-12 PROCEDURE — 94727 GAS DIL/WSHOT DETER LNG VOL: CPT

## 2020-08-12 PROCEDURE — ZZZZZ: CPT

## 2020-08-12 NOTE — ADDENDUM
[FreeTextEntry1] : Documented by Sajan Gupta acting as a scribe for Dr. Eulalio Allison on 08/12/2020 \par \par All medical record entries made by the Scribe were at my, Dr. Eulalio Allison's, direction and personally dictated by me on 08/12/2020 . I have reviewed the chart and agree that the record accurately reflects my personal performance of the history, physical exam, assessment and plan. I have also personally directed, reviewed, and agree with the discharge instructions.

## 2020-08-12 NOTE — ASSESSMENT
[FreeTextEntry1] : Ms. Bloom is a 69 year old female with a history of mm,  rectal CA,AVDz, asthma, allergy, GERD, TBM, s/p pneumonia, who presents  with intermittent SOB. \par \par Her chronic SOB which is multifactorial due to:\par - tracheomalacia (s/p tracheoplasty with residual frequent mucus production, though patient is non-compliant with vest therapy)\par - chronic bronchitis\par - asthma\par - allergic rhinitis\par - GERD\par - poor breathing mechanics \par - CAD/AV disease\par \par \par problem 1a: severe persistent asthma -(steroid dependent) - Stable\par - continue Medrol 8 mg qd\par -continue to use albuterol via the nebulizer QID \par -followed by Mucomyst QiD\par -followed by the acapella device/ chest vest therapy \par -followed by Perforomist via the nebulizer BID\par -followed by Budesonide 0.5% via the nebulizer BID \par -continue to use Breo Ellipta 200 at 1 inhalation QD \par -continue to use Spiriva 1 inhalation QD\par -failed Xolair 225 injection; follow up injections every 2 weeks - Dupixent initiated (12.3.19) - to continue 300 every 2months. \par -continue to use Accolate 20 mg BID\par -Information sheet given about prednisone to the patient to be reviewed, this medication is never to be used without consulting the prescribing physician. Proper dietary restraint is necessary specifically salt containing foods, if any reaction may occur should be reported. \par -Asthma is believed to be caused by inherited (genetic) and environmental factor, but its exact cause is unknown. Asthma may be triggered by allergens, lung infections, or irritants in the air. Asthma triggers are different for each person\par -Inhaler technique reviewed as well as oral hygiene techniques reviewed with patient. Avoidance of cold air, extremes of temperature, rescue inhaler should be used before exercise. Order of medication reviewed with patient. Recommended use of a cool mist humidifier in the bedroom. \par \par problem 2: tracheomalacia, residual bronchomalacia \par -s/p tracheoplasty with Dr. Mt Zapien\par -continue to follow up \par -s/p f/u Dynamic chest CT 10/2019 positive w TBM - repeat \par \par problem 3: chronic bronchitis and mucus clearance\par -continue to use acapella device multiple times daily\par -recommended to use chest vest therapy multiple times daily \par -she is being sent for sputum cultures \par Patient has a chronic cough greater than 6 months, tried and failed manual chest physiotherapy at home, no skilled caregiver available at home to perform manual CPT, tried and failed acapella vibratory physiotherapy, and recommended chest vest therapy \par \par Problem 3A: Multiple infections\par -off Tobramycin BID for 1 month (completed 12/20/19)\par -Complete follow up Sputum culture after completing ABx\par \par Probelm 3B: multi myeloma\par -appointment  on 1/28/2020\par \par problem 4: GERD\par -continue to use Protonix 40 mg before breakfast\par -continue Baclofen 10 mg q-meal\par -Rule of 2s: avoid eating too much, eating too late, eating too spicy, eating two hours before bed\par -Things to avoid including overeating, spicy foods, tight clothing, eating within three hours of bed, this list is not all inclusive. \par -For treatment of reflux, possible options discussed including diet control, H2 blockers, PPIs, as well as coating motility agents discussed as treatment options. Timing of meals and proximity of last meal to sleep were discussed. If symptoms persist, a formal gastrointestinal evaluation is needed. \par \par problem 5: allergic rhinitis \par -continue to use nasal saline\par -continue to use Xyzal 5 mg before bed\par -Environmental measures for allergies were encouraged including mattress and pillow cover, air purifier, and environmental controls. \par \par problem 6: hx of abnormal aortic valve / cardiac health\par -continue to follow up with Dr. Leahy, AV Disease, AF\par -echocadiogram in June 2018  (Tosin); Kale Tristan for f/u \par \par problem 7: colon cancer\par -s/p radiation therapy, surgery \par -continue to use chemotherapy follow up with Dr. King or Dr. Mario\par \par problem 8: poor breathing mechanics\par -Proper breathing techniques were reviewed with an emphasis of exhalation. Patient instructed to breath in for 1 second and out for four seconds. Patient was encouraged to not talk while walking.\par \par problem 9: immunodeficiency\par - Due to the fact that this pt has had more infections than would be expected and immunological blood work is indicated this would include: IgG subclasses, quantitative immunoglobulins, Strep pneumoniae titers as well as Vitamin D levels. Based on this blood work we will be able to decide where the pt needs additional pneumococcal vaccine either Prevnar 13 or pneumovax. Immunology evaluation will also be potentially indicated.\par \par problem 10: r/o immunodeficiency (on Medrol)\par -Due to the fact that this pt has had more infections than would be expected and immunological blood work is indicated this would include: IgG subclasses, quantitative immunoglobulins, Strep pneumoniae titers as well as Vitamin D levels.\par -Based on this blood work we will be able to decide where the pt needs additional pneumococcal vaccine either Prevnar 13 or pneumovax. Immunology evaluation will also be potentially indicated. \par \par problem 11: abnormal chest CT- ? new nodule- likely inflammation \par - F/u PET/CT 4/2019- if changed Bx (Rupali); follow up and re-evaluate as per Rupali\par -questionable DIEUDONNE or HERMELINDO, all sputum is negative \par -CAT scans are the only radiological modality to identify abnormalities w/in the lungs with regards to nodules/masses/lymph nodes. Risks, benefits were reviewed in detail. The guidelines for abnormalities include follow up CT scans at various intervals which could range from 6 weeks to 1 year intervals. If there is a change for the worse then consideration for a biopsy will be considered if you are a candidate. Second opinion evaluation with a thoracic surgeon or an interventional radiologist could be offered. 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 It sometimes develops following a URI, herpes zoster outbreak in pharynx or thyroid or cervical spine injury. However, many patients have no identifiable antecedent explanation. \par \par Problem 13: Health Maintenance/COVID19 Precautions \par - Clean your hands often. Wash your hands often with soap and water for at least 20 seconds, especially after blowing your nose, coughing, or sneezing, or having been in a public place.\par - If soap and water are not available, use a hand  that contains at least 60% alcohol.\par - To the extent possible, avoid touching high-touch surfaces in public places - elevator buttons, door handles, handrails, handshaking with people, etc. Use a tissue or your sleeve to cover your hand or finger if you must touch something.\par - Wash your hands after touching surfaces in public places.\par - Avoid touching your face, nose, eyes, etc.\par - Clean and disinfect your home to remove germs: practice routine cleaning of frequently touched surfaces (for example: tables, doorknobs, light switches, handles, desks, toilets, faucets, sinks & cell phones)\par - Avoid crowds, especially in poorly ventilated spaces. Your risk of exposure to respiratory viruses like COVID-19 may increase in crowded, closed-in settings with little air circulation if there are people in the crowd who are sick. All patients are recommended to practice social distancing and stay at least 6 feet away from others. \par - Avoid all non-essential travel including plane trips, and especially avoid embarking on cruise ships.\par -If COVID-19 is spreading in your community, take extra measures to put distance between yourself and other people to further reduce your risk of being exposed to this new virus.\par -Stay home as much as possible.\par - Consider ways of getting food brought to your house through family, social, or commercial networks\par -Be aware that the virus has been known to live in the air up to 3 hours post exposure, cardboard up to 24 hours post exposure, copper up to 4 hours post exposure, steel and plastic up to 2-3 days post exposure. Risk of transmission from these surfaces are affected by many variables.\par COVID-19 precautionary Immune Support Recommendations:\par -OTC Vitamin C 500mg BID \par -OTC Quercetin 250-500mg BID \par -OTC Zinc 75-100mg per day \par -OTC Melatonin 1 or 2mg a night \par -OTC Vitamin D 1-4000mg per day \par -OTC Tonic Water 8oz per day\par -Water 0.5-1 gallon per day\par Asthma and COVID19:\par You need to make sure your asthma is under control. This often requires the use of inhaled corticosteroids (and sometimes oral corticosteroids). Inhaled corticosteroids do not likely reduce your immune system’s ability to fight infections, but oral corticosteroids may. It is important to use the steps above to protect yourself to limit your exposure to any respiratory virus. \par \par Problem 14A: ********************************* Pulmonary Pre- ProcedureClearance for  Colonocscopy********************\par -at this point in time there are no absolute pulmonary contraindications to go forward with the planned procedure \par \par problem 14:  health maintenance \par -recommended yearly flu shot after October 15 (2019)\par -recommended strep pneumonia vaccines: Prevnar-13 vaccine, followed by Pneumo vaccine 23 one year following\par -recommended early intervention for URIs\par -recommended regular osteoporosis evaluations\par -recommended early dermatological evaluations\par -recommended after the age of 50 to the age of 70, colonoscopy every 5 years \par \par F/U in 6 weeks - SPI / NIOX\par She is encouraged to call with any changes, concerns, or questions.

## 2020-08-12 NOTE — PROCEDURE
[FreeTextEntry1] : 6 minute walk test reveals a low saturation of 98% with severe dyspnea or fatigue; walked 195.6 meters.\par \par Full PFT reveals moderately to severely obstructive flows; FEV1 was 1.31L which is 58% of predicted; mildly reduced lung volumes; normal diffusion at 17.8, which is 93% of predicted; normal flow volume loop  \par \par Chest CT (03.21.2019) reveals Punctate high attenuation foci as well as branching tubular opacities in the anterior segment of the right lower lobe are unchanged when compared to previous exams.\par \par Chest CT (06.21.2019) reveals Nodule in the peripheral aspect of the anterior segment of the right lower lobe has increased in size when compared to previous exam. Exact etiology is unclear.

## 2020-08-12 NOTE — HISTORY OF PRESENT ILLNESS
[FreeTextEntry1] : Ms. Bloom is a 69 year old female with a history of abnormal CXR/chest CT, allergic rhinitis, severe persistent asthma, bronchiectasis, GERD, COPD, recurrent PNA, PND, s/p tracheoplasty, TBM, and SOB presenting to the office today for a follow up visit. Her chief complaint is breathing issue.\par -she states that she had called on Sunday since she felt a discomfort\par -she has been taking Medrol 8mg as a daily dose. \par -this morning she was feeling very SOB while taking a shower. \par -she becomes SOB when she walks. This happens sometimes and other times she is perfectly fine.\par -she is having a colonoscopy tomorrow. \par -she reports that she has been coughing. \par -earlier in the week she was bringing up discolored mucus but now she is no longer having this symptom.\par -she states that she had Asthma since she was 14 years old. \par -she states that she has been able to handle her Asthma but now it has been very problematic for her. \par -she notes that sometimes she is sleeping well but other times she is not sleeping well. \par -she notes that her Oxygen level keeps falling to 94% which has her concerning. \par \par -she denies any chest pain, chest pressure, diarrhea, constipation, dysphagia, dizziness, sour taste in the mouth, leg swelling, leg pain, itchy eyes, itchy ears, heartburn, reflux, myalgias or arthralgias.\par

## 2020-08-12 NOTE — PHYSICAL EXAM
[General Appearance - Well Developed] : well developed [Normal Appearance] : normal appearance [Well Groomed] : well groomed [General Appearance - In No Acute Distress] : no acute distress [General Appearance - Well Nourished] : well nourished [No Deformities] : no deformities [Eyelids - No Xanthelasma] : the eyelids demonstrated no xanthelasmas [Normal Oropharynx] : normal oropharynx [Normal Conjunctiva] : the conjunctiva exhibited no abnormalities [II] : II [Neck Cervical Mass (___cm)] : no neck mass was observed [Neck Appearance] : the appearance of the neck was normal [Thyroid Diffuse Enlargement] : the thyroid was not enlarged [Thyroid Nodule] : there were no palpable thyroid nodules [Jugular Venous Distention Increased] : there was no jugular-venous distention [Heart Rate And Rhythm] : heart rate and rhythm were normal [Heart Sounds] : normal S1 and S2 [Exaggerated Use Of Accessory Muscles For Inspiration] : no accessory muscle use [Respiration, Rhythm And Depth] : normal respiratory rhythm and effort [Abdomen Soft] : soft [Kyphosis] : kyphosis [Abdomen Mass (___ Cm)] : no abdominal mass palpated [Abdomen Tenderness] : non-tender [Gait - Sufficient For Exercise Testing] : the gait was sufficient for exercise testing [Abnormal Walk] : normal gait [Nail Clubbing] : no clubbing of the fingernails [Petechial Hemorrhages (___cm)] : no petechial hemorrhages [Cyanosis, Localized] : no localized cyanosis [No Venous Stasis] : no venous stasis [Skin Color & Pigmentation] : normal skin color and pigmentation [] : no rash [Skin Lesions] : no skin lesions [No Skin Ulcers] : no skin ulcer [No Xanthoma] : no  xanthoma was observed [Deep Tendon Reflexes (DTR)] : deep tendon reflexes were 2+ and symmetric [Sensation] : the sensory exam was normal to light touch and pinprick [No Focal Deficits] : no focal deficits [Oriented To Time, Place, And Person] : oriented to person, place, and time [Affect] : the affect was normal [Impaired Insight] : insight and judgment were intact [FreeTextEntry1] : I:E ratio 1:3, faint expiratory wheeze b/l

## 2020-08-13 ENCOUNTER — RESULT REVIEW (OUTPATIENT)
Age: 70
End: 2020-08-13

## 2020-08-13 ENCOUNTER — OUTPATIENT (OUTPATIENT)
Dept: OUTPATIENT SERVICES | Facility: HOSPITAL | Age: 70
LOS: 1 days | End: 2020-08-13
Payer: MEDICARE

## 2020-08-13 VITALS
HEART RATE: 67 BPM | RESPIRATION RATE: 18 BRPM | HEIGHT: 67 IN | TEMPERATURE: 97 F | WEIGHT: 141.1 LBS | OXYGEN SATURATION: 96 % | DIASTOLIC BLOOD PRESSURE: 50 MMHG | SYSTOLIC BLOOD PRESSURE: 144 MMHG

## 2020-08-13 VITALS
SYSTOLIC BLOOD PRESSURE: 136 MMHG | OXYGEN SATURATION: 100 % | HEART RATE: 68 BPM | DIASTOLIC BLOOD PRESSURE: 69 MMHG | RESPIRATION RATE: 18 BRPM

## 2020-08-13 DIAGNOSIS — Z98.890 OTHER SPECIFIED POSTPROCEDURAL STATES: Chronic | ICD-10-CM

## 2020-08-13 DIAGNOSIS — Z85.048 PERSONAL HISTORY OF OTHER MALIGNANT NEOPLASM OF RECTUM, RECTOSIGMOID JUNCTION, AND ANUS: ICD-10-CM

## 2020-08-13 DIAGNOSIS — Z93.3 COLOSTOMY STATUS: ICD-10-CM

## 2020-08-13 DIAGNOSIS — Z87.09 PERSONAL HISTORY OF OTHER DISEASES OF THE RESPIRATORY SYSTEM: Chronic | ICD-10-CM

## 2020-08-13 DIAGNOSIS — Z98.89 OTHER SPECIFIED POSTPROCEDURAL STATES: Chronic | ICD-10-CM

## 2020-08-13 DIAGNOSIS — H05.352 EXOSTOSIS OF LEFT ORBIT: Chronic | ICD-10-CM

## 2020-08-13 DIAGNOSIS — K62.5 HEMORRHAGE OF ANUS AND RECTUM: Chronic | ICD-10-CM

## 2020-08-13 DIAGNOSIS — Z96.653 PRESENCE OF ARTIFICIAL KNEE JOINT, BILATERAL: Chronic | ICD-10-CM

## 2020-08-13 PROCEDURE — 88305 TISSUE EXAM BY PATHOLOGIST: CPT | Mod: 26

## 2020-08-13 PROCEDURE — 45380 COLONOSCOPY AND BIOPSY: CPT

## 2020-08-13 PROCEDURE — 88305 TISSUE EXAM BY PATHOLOGIST: CPT

## 2020-08-13 RX ORDER — SODIUM CHLORIDE 9 MG/ML
500 INJECTION INTRAMUSCULAR; INTRAVENOUS; SUBCUTANEOUS
Refills: 0 | Status: DISCONTINUED | OUTPATIENT
Start: 2020-08-13 | End: 2020-08-28

## 2020-08-13 RX ORDER — TOBRAMYCIN SULFATE 40 MG/ML
300 VIAL (ML) INJECTION
Qty: 0 | Refills: 0 | DISCHARGE

## 2020-08-13 RX ORDER — SODIUM CHLORIDE 9 MG/ML
1000 INJECTION INTRAMUSCULAR; INTRAVENOUS; SUBCUTANEOUS
Refills: 0 | Status: DISCONTINUED | OUTPATIENT
Start: 2020-08-13 | End: 2020-08-28

## 2020-08-13 RX ADMIN — SODIUM CHLORIDE 30 MILLILITER(S): 9 INJECTION INTRAMUSCULAR; INTRAVENOUS; SUBCUTANEOUS at 09:18

## 2020-08-13 NOTE — ASU PATIENT PROFILE, ADULT - PSH
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  H/O pelvic surgery  5 years ago - s/p fracture  H/O total knee replacement, bilateral  5 years ago  History of partial hysterectomy  30 years ago - fibroids  History of sinus surgery  multiple sinus surgeries  History of tracheomalacia  2015 - attempted tracheal stenting ([facility])- course complicated by obstruction, respiratory failure, multiple CPR attempts -  stent discontinued; 10/20/2016 Tracheobronchoplasty (Prolene Mesh) performed at [facility] by Dr Zapien  Rectal bleeding  exam under anesthesia ([facility]) 2/2018  S/P bronchoscopy  6/5/2018 - [facility] (Dr Zapien) no evidence of tracheobronchomalacia in trachea or bronchial tubes

## 2020-08-13 NOTE — ASU DISCHARGE PLAN (ADULT/PEDIATRIC) - NSDISCH_ACTIVITY_ENDO_ALL_CORE_FT
As you may have been given sedative drugs and medications, you should not drive or operate heavy machinery the next 24 hours. You should avoid any heavy lifting, straining or running today. To the extent possible, defer any important decisions for the next 24 hours.

## 2020-08-13 NOTE — ASU DISCHARGE PLAN (ADULT/PEDIATRIC) - NSDISCH_STOMACH_ENDO_ALL_CORE_FT
You might feel nauseated today. Eat lightly until you are feeling better. If nausea continues for more than 24 hours, please call your doctor. If you do not feel nauseated, you may eat anything you like for the rest of the day.

## 2020-08-13 NOTE — ASU DISCHARGE PLAN (ADULT/PEDIATRIC) - NSDISCH_GASTRIC_ENDO_ALL_CORE_FT
You might feel 'gassy' or "crampy'' for a few hours. This is because air was put into the stomach during the procedure. However, if you have continued abdominal (stomach) pain or swelling, contact your doctor right away.

## 2020-08-13 NOTE — ASU DISCHARGE PLAN (ADULT/PEDIATRIC) - ASU DC SPECIAL INSTRUCTIONSFT
Officer will call with biopsy results in 7-10 days. The next surveillance colonoscopy should be in 3 years.

## 2020-08-13 NOTE — ASU DISCHARGE PLAN (ADULT/PEDIATRIC) - NSDISCH_DIET_ENDO_ALL_CORE_FT
You should resume your previous diet unless otherwise instructed by your doctor. Do not drink alcoholic beverages for the next 24 hours.

## 2020-08-13 NOTE — PRE PROCEDURE NOTE - PRE PROCEDURE EVALUATION
Attending Physician:       Chava Hall                     Procedure: Colonoscopy    Indication for Procedure: Hx of anorectal cancer  ________________________________________________________  PAST MEDICAL & SURGICAL HISTORY:  TIA (transient ischemic attack): multiple, last 5 years ago - presents as right-sided weakness  DVT (deep venous thrombosis): 15-20 years ago, took coumadin  Seizure: x 1 1/7/18  Rectal bleeding  Colorectal cancer: 4/2018- last treatment , chemo and radiation  Tracheobronchomalacia: diagnosed 2015, s/p bronchial thermoplasty 2016 (Dr Zapien); recent bronchoscopy 6/5/2018 revealed no evidence of tracheobronchomalacia in trachea or bronchial tubes  Asthma  Pelvic fracture  Aortic insufficiency: moderate AR on echo 5/3/2018  Adrenal insufficiency: Medrol daily for over 50 years  COPD (chronic obstructive pulmonary disease)  Diabetes: Type 2  Atrial fibrillation: paroxysmal, on eliquis  Rectal bleeding: exam under anesthesia ([facility]) 2/2018  S/P bronchoscopy: 6/5/2018 - [facility] (Dr Zapien) no evidence of tracheobronchomalacia in trachea or bronchial tubes  History of tracheomalacia: 2015 - attempted tracheal stenting ([facility])- course complicated by obstruction, respiratory failure, multiple CPR attempts -  stent discontinued; 10/20/2016 Tracheobronchoplasty (Prolene Mesh) performed at 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  History of sinus surgery: multiple sinus surgeries  H/O total knee replacement, bilateral: 5 years ago  History of partial hysterectomy: 30 years ago - fibroids    ALLERGIES:  ampicillin (Short breath)  aspirin (Short breath)  Avelox (Short breath; Pruritus)  codeine (Short breath)  Dilaudid (Short breath)  iodine (Short breath; Swelling)  penicillin (Short breath)  shellfish (Anaphylaxis)  tetanus toxoid (Short breath)  Valium (Short breath)    HOME MEDICATIONS:  apixaban 5 mg oral tablet: 1 tab(s) orally every 12 hours (held x 2 days)  Crestor 5 mg oral tablet: 1 tab(s) orally once a day  digoxin 250 mcg (0.25 mg) oral tablet: 1 tab(s) orally once a day  Dupixent 300 mg/2 mL subcutaneous solution: 1  subcutaneous every 2 weeks  ipratropium-albuterol 0.5 mg-2.5 mg/3 mLinhalation solution: 3 milliliter(s) inhaled every 6 hours  Lantus 100 units/mL subcutaneous solution: 12 unit(s) subcutaneous once a day (in the morning)  Perforomist 20 mcg/2 mL inhalation solution: 2 milliliter(s) inhaled 2 times a day  tiotropium 18 mcg inhalation capsule: 1 cap(s) inhaled once a day  tobramycin: 300 milligram(s) inhaled 2 times a day  Victoza 18 mg/3 mL subcutaneous solution: 1.8 milligram(s) subcutaneous once a day    AICD/PPM: [ ] yes   [ ] no    PERTINENT LAB DATA: COVID-19 negative 8/11/2020    PHYSICAL EXAMINATION:    T(C): --   HR: -- 98  BP: -- 140/70  RR: --  SpO2: -- 94    Constitutional: NAD  HEENT: PERRLA, EOMI,    Neck:  No JVD  Respiratory: CTAB/L except sl exp wheeze  Cardiovascular: S1 and S2 with 2/6 sys murmur  Gastrointestinal: BS+, soft, NT/ND, (+) colostomy  Extremities: No peripheral edema  Neurological: A/O x 3, no focal deficits  Psychiatric: Normal mood, normal affect  Skin: No rashes    ASA Class: As per anesthesia    COMMENTS:    The patient is a suitable candidate for the planned procedure unless box checked [ ]  No, explain:

## 2020-08-13 NOTE — ASU DISCHARGE PLAN (ADULT/PEDIATRIC) - NSDISCH_ENDOSCOPY_ENDO_ALL_CORE_FT
If an upper endoscopy or enteroscopy was performed, you might have a sore throat for 1 to 2 days after the procedure. If it does not improve, please contact your doctor.

## 2020-08-13 NOTE — ASU DISCHARGE PLAN (ADULT/PEDIATRIC) - NSDISCH_COLONOSCOPY_ENDO_ALL_CORE_FT
If a colonoscopy was performed you might notice a few drops of blood on your underwear or you might see blood on the toilet paper after you use the bathroom. This is caused by irritation to the bowel during the procedure and is not a problem. However if you have any more heavy bleeding (more than 2 tablespoons of bright red blood) contact your doctor right away.

## 2020-08-13 NOTE — ASU DISCHARGE PLAN (ADULT/PEDIATRIC) - NSDISCH_BIOPSY_ENDO_ALL_CORE_FT
If you had a biopsy, you should not take aspirin or aspirin like products for the next 10 days unless instructed to do so by your doctor. If you had a biopsy, check with your doctor before taking any blood thinners such as warfarin (Coumadin). If you had a biopsy, you should not take aspirin or aspirin like products for the next 10 days unless instructed to do so by your doctor. If you had a biopsy, check with your doctor before taking any blood thinners such as warfarin (Coumadin). You may resume your Eliquis this evening if there are no signs of early bleeding following today's polyp removal.

## 2020-08-13 NOTE — ASU DISCHARGE PLAN (ADULT/PEDIATRIC) - NSDISCH_INCISION_ENDO_ALL_CORE_FT
If an incision was performed as part of your procedure, contact your doctor with any pain, swelling, redness, or other concerns you may have about that area.

## 2020-08-14 LAB — SURGICAL PATHOLOGY STUDY: SIGNIFICANT CHANGE UP

## 2020-08-19 ENCOUNTER — OUTPATIENT (OUTPATIENT)
Dept: OUTPATIENT SERVICES | Facility: HOSPITAL | Age: 70
LOS: 1 days | End: 2020-08-19
Payer: MEDICARE

## 2020-08-19 ENCOUNTER — APPOINTMENT (OUTPATIENT)
Dept: CT IMAGING | Facility: IMAGING CENTER | Age: 70
End: 2020-08-19
Payer: MEDICARE

## 2020-08-19 ENCOUNTER — OUTPATIENT (OUTPATIENT)
Dept: OUTPATIENT SERVICES | Facility: HOSPITAL | Age: 70
LOS: 1 days | End: 2020-08-19

## 2020-08-19 ENCOUNTER — RESULT REVIEW (OUTPATIENT)
Age: 70
End: 2020-08-19

## 2020-08-19 DIAGNOSIS — Z87.09 PERSONAL HISTORY OF OTHER DISEASES OF THE RESPIRATORY SYSTEM: Chronic | ICD-10-CM

## 2020-08-19 DIAGNOSIS — Z98.89 OTHER SPECIFIED POSTPROCEDURAL STATES: Chronic | ICD-10-CM

## 2020-08-19 DIAGNOSIS — J39.8 OTHER SPECIFIED DISEASES OF UPPER RESPIRATORY TRACT: ICD-10-CM

## 2020-08-19 DIAGNOSIS — K62.5 HEMORRHAGE OF ANUS AND RECTUM: Chronic | ICD-10-CM

## 2020-08-19 DIAGNOSIS — Z96.653 PRESENCE OF ARTIFICIAL KNEE JOINT, BILATERAL: Chronic | ICD-10-CM

## 2020-08-19 DIAGNOSIS — J45.909 UNSPECIFIED ASTHMA, UNCOMPLICATED: ICD-10-CM

## 2020-08-19 DIAGNOSIS — Z98.890 OTHER SPECIFIED POSTPROCEDURAL STATES: Chronic | ICD-10-CM

## 2020-08-19 DIAGNOSIS — H05.352 EXOSTOSIS OF LEFT ORBIT: Chronic | ICD-10-CM

## 2020-08-19 PROCEDURE — 71250 CT THORAX DX C-: CPT

## 2020-08-19 PROCEDURE — 71250 CT THORAX DX C-: CPT | Mod: 26

## 2020-08-24 ENCOUNTER — APPOINTMENT (OUTPATIENT)
Dept: HEMATOLOGY ONCOLOGY | Facility: CLINIC | Age: 70
End: 2020-08-24

## 2020-08-26 ENCOUNTER — RESULT REVIEW (OUTPATIENT)
Age: 70
End: 2020-08-26

## 2020-08-26 ENCOUNTER — APPOINTMENT (OUTPATIENT)
Dept: HEMATOLOGY ONCOLOGY | Facility: CLINIC | Age: 70
End: 2020-08-26
Payer: MEDICARE

## 2020-08-26 ENCOUNTER — OUTPATIENT (OUTPATIENT)
Dept: OUTPATIENT SERVICES | Facility: HOSPITAL | Age: 70
LOS: 1 days | Discharge: HOME | End: 2020-08-26

## 2020-08-26 VITALS
WEIGHT: 141.32 LBS | HEART RATE: 69 BPM | HEIGHT: 66.54 IN | RESPIRATION RATE: 18 BRPM | DIASTOLIC BLOOD PRESSURE: 70 MMHG | OXYGEN SATURATION: 97 % | TEMPERATURE: 98.5 F | SYSTOLIC BLOOD PRESSURE: 159 MMHG | BODY MASS INDEX: 22.44 KG/M2

## 2020-08-26 DIAGNOSIS — Z96.653 PRESENCE OF ARTIFICIAL KNEE JOINT, BILATERAL: Chronic | ICD-10-CM

## 2020-08-26 DIAGNOSIS — Z98.89 OTHER SPECIFIED POSTPROCEDURAL STATES: Chronic | ICD-10-CM

## 2020-08-26 DIAGNOSIS — H05.352 EXOSTOSIS OF LEFT ORBIT: Chronic | ICD-10-CM

## 2020-08-26 DIAGNOSIS — Z87.09 PERSONAL HISTORY OF OTHER DISEASES OF THE RESPIRATORY SYSTEM: Chronic | ICD-10-CM

## 2020-08-26 DIAGNOSIS — Z98.890 OTHER SPECIFIED POSTPROCEDURAL STATES: Chronic | ICD-10-CM

## 2020-08-26 DIAGNOSIS — C18.9 MALIGNANT NEOPLASM OF COLON, UNSPECIFIED: ICD-10-CM

## 2020-08-26 DIAGNOSIS — K62.5 HEMORRHAGE OF ANUS AND RECTUM: Chronic | ICD-10-CM

## 2020-08-26 LAB
BASOPHILS # BLD AUTO: 0.02 K/UL — SIGNIFICANT CHANGE UP (ref 0–0.2)
BASOPHILS NFR BLD AUTO: 0.2 % — SIGNIFICANT CHANGE UP (ref 0–2)
EOSINOPHIL # BLD AUTO: 0.03 K/UL — SIGNIFICANT CHANGE UP (ref 0–0.5)
EOSINOPHIL NFR BLD AUTO: 0.3 % — SIGNIFICANT CHANGE UP (ref 0–6)
HCT VFR BLD CALC: 41.2 % — SIGNIFICANT CHANGE UP (ref 34.5–45)
HGB BLD-MCNC: 12.5 G/DL — SIGNIFICANT CHANGE UP (ref 11.5–15.5)
IMM GRANULOCYTES NFR BLD AUTO: 0.6 % — SIGNIFICANT CHANGE UP (ref 0–1.5)
LYMPHOCYTES # BLD AUTO: 0.84 K/UL — LOW (ref 1–3.3)
LYMPHOCYTES # BLD AUTO: 8.2 % — LOW (ref 13–44)
MCHC RBC-ENTMCNC: 23.6 PG — LOW (ref 27–34)
MCHC RBC-ENTMCNC: 30.3 GM/DL — LOW (ref 32–36)
MCV RBC AUTO: 77.7 FL — LOW (ref 80–100)
MONOCYTES # BLD AUTO: 0.53 K/UL — SIGNIFICANT CHANGE UP (ref 0–0.9)
MONOCYTES NFR BLD AUTO: 5.2 % — SIGNIFICANT CHANGE UP (ref 2–14)
NEUTROPHILS # BLD AUTO: 8.77 K/UL — HIGH (ref 1.8–7.4)
NEUTROPHILS NFR BLD AUTO: 85.5 % — HIGH (ref 43–77)
NRBC # BLD: 0 /100 WBCS — SIGNIFICANT CHANGE UP (ref 0–0)
PLATELET # BLD AUTO: 268 K/UL — SIGNIFICANT CHANGE UP (ref 150–400)
RBC # BLD: 5.3 M/UL — HIGH (ref 3.8–5.2)
RBC # FLD: 16.9 % — HIGH (ref 10.3–14.5)
WBC # BLD: 10.25 K/UL — SIGNIFICANT CHANGE UP (ref 3.8–10.5)
WBC # FLD AUTO: 10.25 K/UL — SIGNIFICANT CHANGE UP (ref 3.8–10.5)

## 2020-08-26 PROCEDURE — 99214 OFFICE O/P EST MOD 30 MIN: CPT

## 2020-08-26 NOTE — HISTORY OF PRESENT ILLNESS
[de-identified] : Patient developed bleeding AUG 2016 from rectum. A colonoscopy in MAR 2016 was unrevealing. It was thought to be a hemorrhoid in the past. Her HGB began to drop. She was examined She called PCP and referred to GI surgeon (Dr. Magallanes). A mass was found and biopsy showed anal cancer, squamous cell. A PET scan did not reveal any local or metastatic disease. She received Mitomycin on 5/3/2017 (dose #2 mitomycin on 6/1/2017) and Xeloda. Radiation 5/3/17 to 6/16/17 at 5400 cGy. \par \par Patient also with h/o adrenal insufficiency after 50 years of steroids for asthma and tracheomalacia (mesh placed 10/2016, Dr. Mcclellan at [facility]). She has diabetes that requires insulin, without sequelae on the kidney or eye. 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 Will continue to monitor. \par Breast screening: She had breast mammogram and US in 2017 found 2 lesions, and both benign. Rt breast - "benign, nonproliferative fibrocystic changes"; Lt Breast - "fibroadenoma".  Due for repeat 2/2018.\par Cardiac: Will be undergoing SAFIA (Dr. Leahy) for re-evaluation of aortic valve, and bronchoscopy (Dr. Mcclellan). Her valve showed moderate to severe AR.\par \par 5/29/2018: Patient has local relapse in anus proved by biopsy in February 2018. It is again SCC and HPV / p16 is positive. PET 1/2018 showed no metastatic disease. Colonoscopy 2/2018 showed no colonic lesion, only anal mass that is palpable by ARIAS. Patient is deemed not a surgical candidate for APR due to pulmonary and cardiac risk. She completed second course of radiotherapy in early 4/2018 with Dr. Amanda Burger. She was recently hospitalized for high fever. No clear source - blood and urine cultures were negative. The CAT scan showed minor opacities that were not consistent with pnuemonia. She was admitted for 5 days and given IV antibiotics empirically. She now has weakness in legs and uses walker for balance. She will be getting a home rehabilitation visit soon. Does feel lightheaded at times.\par \par 8/22/2019:\par 1: Anal Cancer: Patient underwent APR surgery on 7/24/2018. Pathology post-operatively is ypT2N0; squamous cell cancer. The tissue is healed.  She is due to repeat colonoscopy. \par 2: Lung nodule: She had new lung nodule on right hemidiaphragm that is 1 cm and not FDG avid. She has a second 1 cm nodule on the RML that is subplueral and has mild FDG. Repeat CAT 8/13/19 shows that there is slight improvement of RLL nodule. She saw Dr. Zapien and he agrees to continue to surveillance.\par 3: Cardiac: Undergoing evaluation for possible pulmonary HTN. She also has aortic regurgitation. She has baseline COPD.\par 4: Social: She lives with her sister. She is doing volunteer work, and helps in soup kitchen.\par 5: Pulmonary - using nebulizers daily and less cough.\par \par 8/26/2020:\par 1) Anal CA: She recently underwent colonoscopy on 8/13/2020 with Dr. Hall which demonstrated a polyp in the transverse colon.   Pathology demonstrated tubular adenoma.\par She is here in the office today because concerns of a new lesion located on anus which is ~ 0.8 x 0.8 cm.\par She denies fever, chills, abdominal pain, change in bowel habits, weight loss or increase in fatigue.\par Patient has follow up with Dr. Luong next week.\par 2) Pulmonary: Continues to see Dr. Allison for tracheomalacia. \par 3) Social: Patient currently lives at home with her sister.\par  [de-identified] : Pulmonary: Eulalio Allison  [phone]\par GI surgeon: Terrell Luong; [phone]\par PCP: Anastasiya Jin [phone] \par Cardiologist: Steven Leahy [phone]\par Radiation Oncology: Amanda Burger and Allie aMrt\par Colonoscopy: Rafael\par Wound: Huma Gray [phone]\par \par [facility] doctors: \par PCP/Cardiology: Jordi Engel\par Thoracic Surgeon: Zohaib Zapien

## 2020-08-26 NOTE — PHYSICAL EXAM
[Ambulatory and capable of all self care but unable to carry out any work activities] : Status 2- Ambulatory and capable of all self care but unable to carry out any work activities. Up and about more than 50% of waking hours [Thin] : thin [Normal] : affect appropriate [de-identified] : mild wheeze; upper airway sounds transmitted.  [de-identified] : Left eye enucleation [FreeTextEntry1] : Stoma is functioning well. Small opening at anal site, and no discharge.  Small 0.8 cm X 0.8 cm raised lesion proximal to anal canal located ~ 3-4 cm proximal to anal canal. [de-identified] : non-focal

## 2020-08-26 NOTE — REVIEW OF SYSTEMS
[Negative] : Allergic/Immunologic [FreeTextEntry6] : chronic cough - has been better [FreeTextEntry8] : burning [de-identified] : She walks without cane; gait off balance.

## 2020-08-26 NOTE — CONSULT LETTER
[Please see my note below.] : Please see my note below. [Dear  ___] : Dear  [unfilled], [Courtesy Letter:] : I had the pleasure of seeing your patient, [unfilled], in my office today. [Sincerely,] : Sincerely, [DrShreya  ___] : Dr. MANZO [DrShreya ___] : Dr. MANZO

## 2020-08-27 ENCOUNTER — OUTPATIENT (OUTPATIENT)
Dept: OUTPATIENT SERVICES | Facility: HOSPITAL | Age: 70
LOS: 1 days | End: 2020-08-27
Payer: MEDICARE

## 2020-08-27 ENCOUNTER — APPOINTMENT (OUTPATIENT)
Dept: CT IMAGING | Facility: IMAGING CENTER | Age: 70
End: 2020-08-27
Payer: MEDICARE

## 2020-08-27 ENCOUNTER — RESULT REVIEW (OUTPATIENT)
Age: 70
End: 2020-08-27

## 2020-08-27 DIAGNOSIS — Z98.890 OTHER SPECIFIED POSTPROCEDURAL STATES: Chronic | ICD-10-CM

## 2020-08-27 DIAGNOSIS — Z98.89 OTHER SPECIFIED POSTPROCEDURAL STATES: Chronic | ICD-10-CM

## 2020-08-27 DIAGNOSIS — K62.5 HEMORRHAGE OF ANUS AND RECTUM: Chronic | ICD-10-CM

## 2020-08-27 DIAGNOSIS — Z96.653 PRESENCE OF ARTIFICIAL KNEE JOINT, BILATERAL: Chronic | ICD-10-CM

## 2020-08-27 DIAGNOSIS — Z87.09 PERSONAL HISTORY OF OTHER DISEASES OF THE RESPIRATORY SYSTEM: Chronic | ICD-10-CM

## 2020-08-27 DIAGNOSIS — H05.352 EXOSTOSIS OF LEFT ORBIT: Chronic | ICD-10-CM

## 2020-08-27 DIAGNOSIS — C21.1 MALIGNANT NEOPLASM OF ANAL CANAL: ICD-10-CM

## 2020-08-27 PROCEDURE — 82565 ASSAY OF CREATININE: CPT

## 2020-08-27 PROCEDURE — 71260 CT THORAX DX C+: CPT

## 2020-08-27 PROCEDURE — 71260 CT THORAX DX C+: CPT | Mod: 26

## 2020-08-27 PROCEDURE — 74177 CT ABD & PELVIS W/CONTRAST: CPT

## 2020-08-27 PROCEDURE — 74177 CT ABD & PELVIS W/CONTRAST: CPT | Mod: 26

## 2020-08-28 LAB
ALBUMIN SERPL ELPH-MCNC: 4.4 G/DL
ALP BLD-CCNC: 85 U/L
ALT SERPL-CCNC: 16 U/L
ANION GAP SERPL CALC-SCNC: 15 MMOL/L
AST SERPL-CCNC: 21 U/L
BILIRUB SERPL-MCNC: 0.2 MG/DL
BUN SERPL-MCNC: 15 MG/DL
CALCIUM SERPL-MCNC: 9.2 MG/DL
CEA SERPL-MCNC: 2.7 NG/ML
CHLORIDE SERPL-SCNC: 105 MMOL/L
CO2 SERPL-SCNC: 22 MMOL/L
CREAT SERPL-MCNC: 0.72 MG/DL
GLUCOSE SERPL-MCNC: 164 MG/DL
POTASSIUM SERPL-SCNC: 4.1 MMOL/L
PROT SERPL-MCNC: 6.6 G/DL
SODIUM SERPL-SCNC: 142 MMOL/L

## 2020-08-31 ENCOUNTER — APPOINTMENT (OUTPATIENT)
Dept: COLORECTAL SURGERY | Facility: CLINIC | Age: 70
End: 2020-08-31
Payer: MEDICARE

## 2020-08-31 VITALS — TEMPERATURE: 97.5 F

## 2020-08-31 PROCEDURE — 99213 OFFICE O/P EST LOW 20 MIN: CPT

## 2020-09-03 ENCOUNTER — APPOINTMENT (OUTPATIENT)
Dept: THORACIC SURGERY | Facility: CLINIC | Age: 70
End: 2020-09-03
Payer: MEDICARE

## 2020-09-03 DIAGNOSIS — Z98.890 OTHER SPECIFIED POSTPROCEDURAL STATES: ICD-10-CM

## 2020-09-03 PROCEDURE — 99213 OFFICE O/P EST LOW 20 MIN: CPT

## 2020-09-04 NOTE — END OF VISIT
[FreeTextEntry3] : I, SAMIRA HERRERA , am scribing for and in the presence of ANUSHA KENT the following sections: history of present illness, past medical/family/surgical/family/social history, review of systems, vital signs, physical exam, and disposition.\par \par

## 2020-09-04 NOTE — ASSESSMENT
[FreeTextEntry1] : 68 y/o female with a PMH of stage III A, T2N1 squamous cell carcinoma of anus, s/p chemo and radiation, tracheobronchomalacia s/p Right VATS, robotic assisted tracheobronchoplasty with Prolene mesh on 10/25/16. She is currently on Eliquis for Afib. She underwent a bronchoscopy with Holmium laser on 2/14/20. She presents today for a follow up visit to review recent CT chest, abdomen and pelvis.\par \par Today she reports feeling generally well. She does admit to increasing SOB on exertion and at rest.  CT chest completed on 8/19/20 was reviewed which revealed the trachea is narrowed by just greater than 70% during expiration. This could suggest underlying tracheomalacia.No tracheal nodularity or calcification. No bronchiectasis. Secretions in the trachea, right mainstem bronchus, and bronchus intermedius. CT abdomen and pelvis completed on 8/30/20: was reviewed which revealed no new findings.A 4 mm right upper lobe pulmonary nodule on prior study is not visualized.\par \par I explained that a Dynamic CT chest can over or underestimate the amount of collapse and that 70% is not considered severe collapse. Will set her up for an awake dynamic bronchoscopy for further evaluation, we can possibly do repeat holmium laser at the same time. \par \par PLAN:\par 1. Awake Dynamic Bronchoscopy and possible repeat holmium laser at the same time \par 2. Medical clearance and labs\par

## 2020-09-04 NOTE — HISTORY OF PRESENT ILLNESS
[FreeTextEntry1] : 70 y/o female with a PMH of stage III A, T2N1 squamous cell carcinoma of anus, s/p chemo and radiation, tracheobronchomalacia s/p Right VATS, robotic assisted tracheobronchoplasty with Prolene mesh on 10/25/16. She is currently on Eliquis for Afib. She underwent a bronchoscopy with Holmium laser on 2/14/20. She presents today for a follow up visit to review recent CT chest, abdomen and pelvis and for symptom re-evaluation.\par \par Awake bronchoscopy with lavage on 9/19/17:\par -less than 50% collapse of the proximal and mid trachea on cough and deep expiration, and 70-80% collapse of the distal trachea and bilateral mainstem bronchi\par -lavage culture was positive for Acinetobacter lwoffi. \par \par Admission to [facility] in late Dec 2017 for SOB. CXR and CT chest were done during hospitalization, with two stable exophytic pancreatic tail cysts seen. She also had colonoscopy done which demonstrated a 2 cm hard anal lesion. Pathology revealed gastric antral mucosa with mild reactive gastropathy.\par \par PET CT 1/5/18 revealed nonspecific FDG activity in the anal region. It also revealed mucoid impacted distal airways, small peripheral nodules and scattered peripheral right lower lobe tree-in-bud opacities (stable).\par MRI of the brain done on 1/17/18 showed no evidence of metastatic disease. \par \par She was seen by Dr. Terrell Luong and on 2/9/18 she underwent a biopsy of the anal lesion. Pathology showed recurrence of invasive, moderately to poorly differentiated squamous cell carcinoma. Dr. Luong determined she was not a surgical candidate and her oncologist Dr. Zach King determined she was not a candidate for systemic chemotherapy due to her cardiac and pulmonary issues and referred her to radiation oncology.\par \par She completed of 3000 cGy RT to anus on 4/18/18 with Dr. Amanda Burger, and tolerated the treatment well. \par She was last seen in our office on 3/15/18, the plan was for her to undergo RT. Once she completes the RT, will focus on management of her aortic valve disease. \par \par Echocardiogram done on 5/3/18:\par -EF 63% (previously 70% in March 2017), mild aortic regurgitation\par \par CT chest/ abd/ pelvis completed on 12/13/18:\par - stable post-op changes s/p distal colectomy \par - improvement of RIGHT lower lobe with consolidation compared to prior examination with mild residual opacity which is favored to be inflammatory in etiology\par - indeterminate nodular opacity along the dome of the right hemidiaphragm measuring 10 mm.\par - no evidence of metastatic disease in abdomen or pelvis\par - no evidence of thoracic, abdominal, or pelvic lymphadenopathy\par \par CT chest completed on 03/21/19:\par -high attenuation foci as well as branching tubular opacities in the anterior segment of the right lower lobe are unchanged \par \par PET CT completed on 04/18/19:\par -indeterminate FDG-avid Right middle lobe subpleural nodular opacity\par -non fdg-avid 1cm nodular density along the Right hemidiaphragm compared to prior PET/CT and increased in size as compared to prior \par \par CT chest completed on 04/24/19:\par -no consolidation\par -a 5mm nodule in the right lower lobe\par -bronchial wall thickening right worse than left lower lobe\par -centrilobular groundglass nodularity in the Right lower lobe\par -asymmetric reticulation identified subpleural right middle and anterior right lower lobe\par \par Spirometry done on 06/11/19 showed FEV1 = 0.74, 39% of predicted value, FVC = 1.62, 65% of predicted value, PEF = 2.08, 42% of predicted value.\par  \par CT chest completed on 06/21/19:\par -1.7 x 1.3cm nodule in the peripheral aspect of the anterior segment of the Right lower lobe, previously a patchy ill-defined opacity\par -mild increase in pleural thickening in the lateral segment of the right middle lobe\par \par PET scan completed on 7/3/19:\par - minimally FDG avid nodular opacity in subpleural RML is increased in size and decreased in metabolism as compared to prior PET (SUV 3.2) \par - mildly FDG avid 1.7 x 1.3 cm nodule in the right peripheral aspect of the anterior segment of the right lower lobe is increase in size and FDG avidity as compared to 4/18/19, and is not significantly changed as compared to CT date 6/21/10 (SUV 3.0) \par \par CT chest completed on 08/13/19:\par -right lower lobe peripheral nodule measures 1.6 x 1.1cm has slightly decreased in size when compared \par -right middle lobe peripheral pleural-based opacity measures 2.7 x 0.9cm and is unchanged \par -minimal areas of bronchial thickening in the right middle lobe and bilateral lower lobes. \par -right basilar nodule measuring 1.0 cm is uncharged \par \par NM VQ scan completed on 08/13/19:\par -low probability of pulmonary embolus \par -interval resolution of matched ventilation/ perfusion defect involving the basal segment of the left lower lobe\par \par CT chest completed on 10/22/19:\par - Tracheobronchomalacia. ( 70% narrowing of trachea and bilateral bronchi)\par Interval significant decrease of subpleural nodules within the right middle lobe and right lower lobe.\par No new or enlarging pulmonary nodule. \par Again noted, the bones are heterogeneous with multiple sclerotic foci within the vertebral bodies, \par indeterminant. Further evaluation can be performed with both bone scan.\par \par CT abdomen and pelvis completed on 10/22/19:\par - No evidence of metastatic disease in the abdomen/pelvis. \par - Pancreatic tail cysts measuring up to 2.1 cm without significant change. \par \par Awake/ Dynamic Bronchoscopy completed on 11/08/19:\par -cervical and upper trachea demonstrated approximately 20% collapse with the distal trachea demonstrating 60% collapse\par -the left and right mainstem bronchi had less than 50% collapse\par \par EMG of bilateral lower limbs completed on 01/22/20:\par -sensory neuropathy of lower limbs (absent sural response with extant superficial peroneal responses).\par -mild upper lumbar denervation changes that suggest possibility of chronic radiculopathy \par \par ECHO completed on 01/23/20:\par -mild annular calcification, otherwise normal mitral valve. Minimal to mild mitral regurgitation\par -thickened aortic valve. Moderate-severe aortic regurgitation\par -mild aortic root dilation\par -mild concentric left ventricular hypertrophy\par -normal left ventricular systolic function. No segmental wall motion abnormalities\par -mild tricuspid regurgitation\par -minimal pulmonic regurgitation\par -normal pericardium with trace pericardial effusion \par \par PFT done on 8/12/20  showed FEV1 = 1.31 ,58 % of predicted value, FVC = 2.42 ,80 % of predicted value, PEF = 4.87 , 84% of predicted value and DLCO =17.8 , 93% of predicted value.\par  \par \par CT chest completed on 8/19/20:\par - The trachea is narrowed by just greater than 70% during expiration. This could suggest underlying tracheomalacia.\par - No tracheal nodularity or calcification. No bronchiectasis. Secretions in the trachea, right mainstem bronchus, and bronchus intermedius.\par \par CT abdomen and pelvis completed on 8/30/20:\par No new findings.\par A 4 mm right upper lobe pulmonary nodule on prior study is not visualized\par \par \par \par \par \par

## 2020-09-11 NOTE — HISTORY OF PRESENT ILLNESS
[FreeTextEntry1] : Very pleasant 69-year-old female who is well known to me. Patient has multiple medical problems including cardiac disease and tracheomalacia. She had squamous cell carcinoma of the anus which was refractory to radiation/chemotherapy. Despite her high risk she required an abdominoperineal resection. Amazingly she has survived over 2 years ago.\par \par Patient presents today after a home care aide noted a small lesion in the intergluteal region. This does not pain her and does not bleed.\par \par Inspection reveals that her perineal wound has healed quite well.  Approximately 4-5 cm from this region there is a less than 1 cm excrescence in the intergluteal region.\par \par Patient has labored breathing at rest. I believe this skin lesion in the intergluteal region is not life-threatening at this point. I would simply observe and allow her pulmonary issues to be addressed. I can always re-evaluate this skin lesion in 3-6 months.

## 2020-09-28 ENCOUNTER — APPOINTMENT (OUTPATIENT)
Dept: HEART AND VASCULAR | Facility: CLINIC | Age: 70
End: 2020-09-28
Payer: MEDICARE

## 2020-09-28 VITALS
DIASTOLIC BLOOD PRESSURE: 80 MMHG | WEIGHT: 141 LBS | BODY MASS INDEX: 22.39 KG/M2 | HEART RATE: 76 BPM | SYSTOLIC BLOOD PRESSURE: 120 MMHG | HEIGHT: 66.4 IN | TEMPERATURE: 97.2 F | RESPIRATION RATE: 14 BRPM | OXYGEN SATURATION: 96 %

## 2020-09-28 PROCEDURE — 93000 ELECTROCARDIOGRAM COMPLETE: CPT | Mod: NC

## 2020-09-28 PROCEDURE — 93306 TTE W/DOPPLER COMPLETE: CPT

## 2020-09-28 PROCEDURE — 99214 OFFICE O/P EST MOD 30 MIN: CPT | Mod: 57

## 2020-09-30 NOTE — PHYSICAL EXAM
[General Appearance - Well Developed] : well developed [Normal Appearance] : normal appearance [Well Groomed] : well groomed [General Appearance - Well Nourished] : well nourished [No Deformities] : no deformities [General Appearance - In No Acute Distress] : no acute distress [Normal Conjunctiva] : the conjunctiva exhibited no abnormalities [Eyelids - No Xanthelasma] : the eyelids demonstrated no xanthelasmas [Normal Jugular Venous A Waves Present] : normal jugular venous A waves present [Normal Jugular Venous V Waves Present] : normal jugular venous V waves present [No Jugular Venous Espino A Waves] : no jugular venous espino A waves [Respiration, Rhythm And Depth] : normal respiratory rhythm and effort [Exaggerated Use Of Accessory Muscles For Inspiration] : no accessory muscle use [Auscultation Breath Sounds / Voice Sounds] : lungs were clear to auscultation bilaterally [Heart Rate And Rhythm] : heart rate and rhythm were normal [Heart Sounds] : normal S1 and S2 [Edema] : no peripheral edema present [Systolic grade ___/6] : A grade [unfilled]/6 systolic murmur was heard. [Bowel Sounds] : normal bowel sounds [Abdomen Tenderness] : non-tender [Abnormal Walk] : normal gait [Gait - Sufficient For Exercise Testing] : the gait was sufficient for exercise testing [Nail Clubbing] : no clubbing of the fingernails [Cyanosis, Localized] : no localized cyanosis [Petechial Hemorrhages (___cm)] : no petechial hemorrhages [Nail Splinter Hemorrhages] : no splinter hemorrhages of the nails [Fingers Osler's Nodes] : Osler's nodes were not seenon the fingers [Skin Color & Pigmentation] : normal skin color and pigmentation [] : no rash [No Venous Stasis] : no venous stasis [Skin Lesions] : no skin lesions [No Skin Ulcers] : no skin ulcer [No Xanthoma] : no  xanthoma was observed [Oriented To Time, Place, And Person] : oriented to person, place, and time [Impaired Insight] : insight and judgment were intact [Affect] : the affect was normal [Mood] : the mood was normal [Memory Recent] : recent memory was not impaired [Memory Remote] : remote memory was not impaired [FreeTextEntry1] : mildly decreased skin turgor

## 2020-09-30 NOTE — DISCUSSION/SUMMARY
[Procedure Low Risk] : the procedure risk is low [Patient Intermediate Risk] : the patient is an intermediate risk [Optimized for Surgery] : the patient is optimized for surgery [FreeTextEntry3] : Hold Eliquis 48 hours prior to surgery  [FreeTextEntry1] : echocardiogram results reviewed with patient \par \par Defer annual flu vaccine until late October

## 2020-09-30 NOTE — ASSESSMENT
[FreeTextEntry1] : New development of LAFB  without angina, CHF , syncope\par \par Paroxysmal afib on Eliquis  for thromboembolic prophylaxis \par \par tracheomalacia \par \par rectal cancer

## 2020-09-30 NOTE — REVIEW OF SYSTEMS
[Dyspnea on exertion] : dyspnea during exertion [Cough] : cough [Joint Pain] : joint pain [Joint Stiffness] : joint stiffness [Dizziness] : dizziness [Negative] : Heme/Lymph [Recent Weight Gain (___ Lbs)] : no recent weight gain [Feeling Fatigued] : not feeling fatigued [Recent Weight Loss (___ Lbs)] : no recent weight loss [Wheezing] : no wheezing [Coughing Up Blood] : no hemoptysis

## 2020-09-30 NOTE — HISTORY OF PRESENT ILLNESS
[Preoperative Visit] : for a medical evaluation prior to surgery [Scheduled Procedure ___] : a [unfilled] [Date of Surgery ___] : on [unfilled] [Surgeon Name ___] : surgeon: [unfilled] [Stable] : Stable [Dyspnea] : dyspnea [Diabetes] : diabetes [Cardiovascular Disease] : cardiovascular disease [Dyspnea on Exertion] : difficulty breathing during exertion [Thromboembolic Problems] : thromboembolic problems [Prior Anesthesia] : Prior anesthesia [Electrocardiogram] : ~T an ECG ~C was performed [Echocardiogram] : ~T an echocardiogram ~C was performed [Fair] : Fair [Fever] : no fever [Chills] : no chills [Fatigue] : no fatigue [Chest Pain] : no chest pain [Cough] : no cough [Dysuria] : no dysuria [Urinary Frequency] : no urinary frequency [Nausea] : no nausea [Vomiting] : no vomiting [Diarrhea] : no diarrhea [Abdominal Pain] : no abdominal pain [Easy Bruising] : no easy bruising [Lower Extremity Swelling] : no lower extremity swelling [Poor Exercise Tolerance] : no poor exercise tolerance [Alcohol Use] : no  alcohol use [Renal Disease] : no renal disease [GI Disease] : no gastrointestinal disease [Sleep Apnea] : no sleep apnea [Frequent use of NSAIDs] : no use of NSAIDs [Prev Anesthesia Reaction] : no previous anesthesia reaction [Anesthesia Reaction] : no anesthesia reaction [Sudden Death] : no sudden death [Clotting Disorder] : no clotting disorder [Bleeding Disorder] : no bleeding disorder [FreeTextEntry1] : 70   year old female with HTN, type 2 diabetes and hyperlipidemia, has  hx of rectal  cancer treated with radiation and chemotherapy  and  PET evidence of residual disease  followed by  20 additional doses of pelvic/rectal radiation . \par \par She has moderately severe chronic  moderately severe aortic regurgitation and paroxysmal atrial fibrillation with hx of TIAs but has been on Eliquis 5mg po bid for thromboembolic prophylaxis. \par \par She has hx of COPD and tracheomalacia  with bronchiectasis but no CHF, syncope, angina or recent strokes. \par \par She is clinically in normal sinus rhythm at this time. \par \par Type A blood  Rh +   with negative antibody screen \par \par Has fatigue and poor appetite but requests Glucerna liquid nutritional supplement . ~ 30 lb weight loss over past year\par \par She presents for clearance prior to bronchoscopy \par \par \par EKG shows NSR 73 bpm with new LAFB  but no ectopy, ischemia   There is atrial abnormality detected  without LVH  and high lateral T wave inversions  which are not new

## 2020-10-01 ENCOUNTER — TRANSCRIPTION ENCOUNTER (OUTPATIENT)
Age: 70
End: 2020-10-01

## 2020-10-01 PROBLEM — L02.229 BOIL OF TRUNK: Status: ACTIVE | Noted: 2020-01-05

## 2020-10-02 ENCOUNTER — OUTPATIENT (OUTPATIENT)
Dept: OUTPATIENT SERVICES | Facility: HOSPITAL | Age: 70
LOS: 1 days | Discharge: HOME | End: 2020-10-02
Payer: MEDICARE

## 2020-10-02 ENCOUNTER — APPOINTMENT (OUTPATIENT)
Dept: THORACIC SURGERY | Facility: HOSPITAL | Age: 70
End: 2020-10-02

## 2020-10-02 DIAGNOSIS — K62.5 HEMORRHAGE OF ANUS AND RECTUM: Chronic | ICD-10-CM

## 2020-10-02 DIAGNOSIS — H05.352 EXOSTOSIS OF LEFT ORBIT: Chronic | ICD-10-CM

## 2020-10-02 DIAGNOSIS — Z87.09 PERSONAL HISTORY OF OTHER DISEASES OF THE RESPIRATORY SYSTEM: Chronic | ICD-10-CM

## 2020-10-02 DIAGNOSIS — Z98.89 OTHER SPECIFIED POSTPROCEDURAL STATES: Chronic | ICD-10-CM

## 2020-10-02 DIAGNOSIS — Z96.653 PRESENCE OF ARTIFICIAL KNEE JOINT, BILATERAL: Chronic | ICD-10-CM

## 2020-10-02 DIAGNOSIS — Z98.890 OTHER SPECIFIED POSTPROCEDURAL STATES: Chronic | ICD-10-CM

## 2020-10-02 LAB — GLUCOSE BLDC GLUCOMTR-MCNC: 142 MG/DL — HIGH (ref 70–99)

## 2020-10-02 PROCEDURE — 31622 DX BRONCHOSCOPE/WASH: CPT

## 2020-10-02 PROCEDURE — 82962 GLUCOSE BLOOD TEST: CPT

## 2020-10-07 ENCOUNTER — RX RENEWAL (OUTPATIENT)
Age: 70
End: 2020-10-07

## 2020-10-27 ENCOUNTER — APPOINTMENT (OUTPATIENT)
Dept: INTERNAL MEDICINE | Facility: CLINIC | Age: 70
End: 2020-10-27

## 2020-10-30 ENCOUNTER — APPOINTMENT (OUTPATIENT)
Dept: PULMONOLOGY | Facility: CLINIC | Age: 70
End: 2020-10-30
Payer: MEDICARE

## 2020-10-30 VITALS
WEIGHT: 142 LBS | BODY MASS INDEX: 22.82 KG/M2 | RESPIRATION RATE: 14 BRPM | TEMPERATURE: 97 F | HEART RATE: 72 BPM | HEIGHT: 66 IN | DIASTOLIC BLOOD PRESSURE: 65 MMHG | SYSTOLIC BLOOD PRESSURE: 130 MMHG | OXYGEN SATURATION: 98 %

## 2020-10-30 PROCEDURE — 99214 OFFICE O/P EST MOD 30 MIN: CPT

## 2020-10-30 NOTE — ASSESSMENT
[FreeTextEntry1] : Ms. Bloom is a 70 year old female with a history of mm, rectal CA, AVDz, asthma, allergy, GERD, TBM, s/p multiple pneumonias, who presents with intermittent SOB. (But "this is not her asthma"). \par \par Her chronic SOB which is multifactorial due to:\par - tracheomalacia (s/p tracheoplasty with residual frequent mucus production, though patient is non-compliant with vest therapy)\par - chronic bronchitis\par - asthma\par - allergic rhinitis\par - GERD\par - poor breathing mechanics \par - CAD/AV disease\par \par \par problem 1a: severe persistent asthma -(steroid dependent) - Stable\par -continue Medrol 4 mg qd\par -continue to use albuterol via the nebulizer QID \par -followed by Mucomyst QiD\par -followed by the acapella device/ chest vest therapy \par -followed by Perforomist via the nebulizer BID\par -followed by Budesonide 0.5% via the nebulizer BID \par -continue to use Breo Ellipta 200 at 1 inhalation QD \par -continue to use Spiriva 1 inhalation QD\par -failed Xolair 225 injection; follow up injections every 2 weeks - Dupixent initiated (12.3.19) - to continue 300 every 2 weeks. \par -continue to use Accolate 20 mg BID\par -Information sheet given about prednisone to the patient to be reviewed, this medication is never to be used without consulting the prescribing physician. Proper dietary restraint is necessary specifically salt containing foods, if any reaction may occur should be reported. \par -Asthma is believed to be caused by inherited (genetic) and environmental factor, but its exact cause is unknown. Asthma may be triggered by allergens, lung infections, or irritants in the air. Asthma triggers are different for each person\par -Inhaler technique reviewed as well as oral hygiene techniques reviewed with patient. Avoidance of cold air, extremes of temperature, rescue inhaler should be used before exercise. Order of medication reviewed with patient. Recommended use of a cool mist humidifier in the bedroom. \par \par problem 2: tracheomalacia, residual bronchomalacia \par -s/p tracheoplasty with Dr. Mt Zapien\par -Bronchoscopy negative. \par -continue to follow up \par -s/p f/u Dynamic chest CT 10/2019 positive w TBM - repeat \par \par problem 3: chronic bronchitis and mucus clearance\par -continue to use acapella device multiple times daily\par -recommended to use chest vest therapy multiple times daily \par -she is being sent for sputum cultures \par Patient has a chronic cough greater than 6 months, tried and failed manual chest physiotherapy at home, no skilled caregiver available at home to perform manual CPT, tried and failed acapella vibratory physiotherapy, and recommended chest vest therapy \par \par Problem 3A: Multiple infections\par -off Tobramycin BID for 1 month (completed 12/20/19)\par -Complete follow up Sputum culture after completing ABx if needed. \par \par Problem 3B: multi myeloma\par -appointment  on 1/28/2020\par \par problem 4: GERD\par -continue to use Protonix 40 mg before breakfast\par -continue Baclofen 10 mg q-meal\par -Rule of 2s: avoid eating too much, eating too late, eating too spicy, eating two hours before bed\par -Things to avoid including overeating, spicy foods, tight clothing, eating within three hours of bed, this list is not all inclusive. \par -For treatment of reflux, possible options discussed including diet control, H2 blockers, PPIs, as well as coating motility agents discussed as treatment options. Timing of meals and proximity of last meal to sleep were discussed. If symptoms persist, a formal gastrointestinal evaluation is needed. \par \par problem 5: allergic rhinitis \par -continue to use nasal saline\par -continue to use Xyzal 5 mg before bed\par -Environmental measures for allergies were encouraged including mattress and pillow cover, air purifier, and environmental controls. \par \par problem 6: hx of abnormal aortic valve / cardiac health\par -continue to follow up with Dr. Leahy, AV Disease, AF\par -echocardiogram in June 2018  (Tosin); Kale Tristan for f/u \par \par problem 7: colon cancer\par -s/p radiation therapy, surgery \par -continue to use chemotherapy follow up with Dr. King or Dr. Mario\par \par problem 8: poor breathing mechanics\par -Proper breathing techniques were reviewed with an emphasis of exhalation. Patient instructed to breath in for 1 second and out for four seconds. Patient was encouraged to not talk while walking.\par \par problem 9: immunodeficiency\par - Due to the fact that this pt has had more infections than would be expected and immunological blood work is indicated this would include: IgG subclasses, quantitative immunoglobulins, Strep pneumoniae titers as well as Vitamin D levels. Based on this blood work we will be able to decide where the pt needs additional pneumococcal vaccine either Prevnar 13 or pneumovax. Immunology evaluation will also be potentially indicated.\par \par problem 10: r/o immunodeficiency (on Medrol)\par -Due to the fact that this pt has had more infections than would be expected and immunological blood work is indicated this would include: IgG subclasses, quantitative immunoglobulins, Strep pneumoniae titers as well as Vitamin D levels.\par -Based on this blood work we will be able to decide where the pt needs additional pneumococcal vaccine either Prevnar 13 or pneumovax. Immunology evaluation will also be potentially indicated. \par \par problem 11: abnormal chest CT- ? new nodule- likely inflammation \par - F/u PET/CT 4/2019- if changed Bx (Rupali); follow up and re-evaluate as per Rupali\par -questionable DIEUDONNE or HERMELINDO, all sputum is negative \par -CAT scans are the only radiological modality to identify abnormalities w/in the lungs with regards to nodules/masses/lymph nodes. Risks, benefits were reviewed in detail. The guidelines for abnormalities include follow up CT scans at various intervals which could range from 6 weeks to 1 year intervals. If there is a change for the worse then consideration for a biopsy will be considered if you are a candidate. Second opinion evaluation with a thoracic surgeon or an interventional radiologist could be offered. 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 It sometimes develops following a URI, herpes zoster outbreak in pharynx or thyroid or cervical spine injury. However, many patients have no identifiable antecedent explanation. \par \par Problem 13: Health Maintenance/COVID19 Precautions \par - Clean your hands often. Wash your hands often with soap and water for at least 20 seconds, especially after blowing your nose, coughing, or sneezing, or having been in a public place.\par - If soap and water are not available, use a hand  that contains at least 60% alcohol.\par - To the extent possible, avoid touching high-touch surfaces in public places - elevator buttons, door handles, handrails, handshaking with people, etc. Use a tissue or your sleeve to cover your hand or finger if you must touch something.\par - Wash your hands after touching surfaces in public places.\par - Avoid touching your face, nose, eyes, etc.\par - Clean and disinfect your home to remove germs: practice routine cleaning of frequently touched surfaces (for example: tables, doorknobs, light switches, handles, desks, toilets, faucets, sinks & cell phones)\par - Avoid crowds, especially in poorly ventilated spaces. Your risk of exposure to respiratory viruses like COVID-19 may increase in crowded, closed-in settings with little air circulation if there are people in the crowd who are sick. All patients are recommended to practice social distancing and stay at least 6 feet away from others. \par - Avoid all non-essential travel including plane trips, and especially avoid embarking on cruise ships.\par -If COVID-19 is spreading in your community, take extra measures to put distance between yourself and other people to further reduce your risk of being exposed to this new virus.\par -Stay home as much as possible.\par - Consider ways of getting food brought to your house through family, social, or commercial networks\par -Be aware that the virus has been known to live in the air up to 3 hours post exposure, cardboard up to 24 hours post exposure, copper up to 4 hours post exposure, steel and plastic up to 2-3 days post exposure. Risk of transmission from these surfaces are affected by many variables.\par COVID-19 precautionary Immune Support Recommendations:\par -OTC Vitamin C 500mg BID \par -OTC Quercetin 250-500mg BID \par -OTC Zinc 75-100mg per day \par -OTC Melatonin 1 or 2mg a night \par -OTC Vitamin D 1-4000mg per day \par -OTC Tonic Water 8oz per day\par -Water 0.5-1 gallon per day\par Asthma and COVID19:\par You need to make sure your asthma is under control. This often requires the use of inhaled corticosteroids (and sometimes oral corticosteroids). Inhaled corticosteroids do not likely reduce your immune system’s ability to fight infections, but oral corticosteroids may. It is important to use the steps above to protect yourself to limit your exposure to any respiratory virus. \par \par problem 14:  health maintenance \par -s/p flu shot 10/30/2020 - flu shot given in office. \par -recommended strep pneumonia vaccines: Prevnar-13 vaccine, followed by Pneumo vaccine 23 one year following\par -recommended early intervention for URIs\par -recommended regular osteoporosis evaluations\par -recommended early dermatological evaluations\par -recommended after the age of 50 to the age of 70, colonoscopy every 5 years \par \par F/U in 6 weeks - SPI / NIOX\par She is encouraged to call with any changes, concerns, or questions.

## 2020-10-30 NOTE — HISTORY OF PRESENT ILLNESS
[FreeTextEntry1] : Ms. Bloom is a 70 year old female with a history of abnormal CXR/chest CT, allergic rhinitis, severe persistent asthma, bronchiectasis, GERD, COPD, recurrent PNA, PND, s/p tracheoplasty, TBM, and SOB presenting to the office today for a follow up visit. Her chief complaint is Neck pain. \par -her neck was having severe pain; she has been taking Neurontin for her pain. She can take Neurontin up to 4 times a day. Pain travels to the shoulder. \par -denies palpitations. \par -reports nocturia. Has been trying to drink less water closer to bedtime. \par -she is s/p bronchoscopy. \par -There are no visual issues.  \par -she is not taking Albuterol as often he used to take it \par -she states that her lungs feel much better than before. \par -sometimes brings up whitish mucus. \par -she states that the mucus is very thick. \par -she reports that she tries not to panic about the whitish mucus. \par -she believes that she is doing well from a pulmonary health. \par -reports SOB while going up the stairs. She does not think that her Asthma is causing that SOB. \par \par -She denies any chest pain, chest pressure, diarrhea, constipation, dysphagia, dizziness, sour taste in the mouth, leg swelling, leg pain, itchy eyes, itchy ears.

## 2020-10-30 NOTE — ADDENDUM
[FreeTextEntry1] : Documented by Sajan Gupat acting as a scribe for Dr. Eulalio Allison on 10/30/2020 \par \par All medical record entries made by the Scribe were at my, Dr. Eulalio Allison's, direction and personally dictated by me on 10/30/2020 . I have reviewed the chart and agree that the record accurately reflects my personal performance of the history, physical exam, assessment and plan. I have also personally directed, reviewed, and agree with the discharge instructions.

## 2020-10-30 NOTE — PROCEDURE
[FreeTextEntry1] : CT Chest IC (8.27.2020) reveals no new findings. a 4 mm right upper lobe pulmonary nodule on prior study is not visualized. \par \par CT Chest (8.19.2020) reveals the trachea is narrowed by just greater than 70% during expiration. this could suggest underlying tracheomalacia.

## 2020-10-30 NOTE — PHYSICAL EXAM

## 2020-11-10 ENCOUNTER — APPOINTMENT (OUTPATIENT)
Dept: INTERNAL MEDICINE | Facility: CLINIC | Age: 70
End: 2020-11-10
Payer: MEDICARE

## 2020-11-10 VITALS
OXYGEN SATURATION: 99 % | DIASTOLIC BLOOD PRESSURE: 58 MMHG | HEART RATE: 65 BPM | TEMPERATURE: 97.3 F | SYSTOLIC BLOOD PRESSURE: 142 MMHG | BODY MASS INDEX: 22.5 KG/M2 | HEIGHT: 66 IN | WEIGHT: 140 LBS

## 2020-11-10 DIAGNOSIS — M85.88 OTHER SPECIFIED DISORDERS OF BONE DENSITY AND STRUCTURE, OTHER SITE: ICD-10-CM

## 2020-11-10 DIAGNOSIS — R91.1 SOLITARY PULMONARY NODULE: ICD-10-CM

## 2020-11-10 DIAGNOSIS — R53.83 OTHER FATIGUE: ICD-10-CM

## 2020-11-10 PROCEDURE — G0439: CPT

## 2020-11-10 PROCEDURE — 36415 COLL VENOUS BLD VENIPUNCTURE: CPT

## 2020-11-10 RX ORDER — VALACYCLOVIR 1 G/1
1 TABLET, FILM COATED ORAL
Qty: 14 | Refills: 1 | Status: DISCONTINUED | COMMUNITY
Start: 2020-05-05 | End: 2020-11-10

## 2020-11-10 RX ORDER — METHYLPREDNISOLONE 4 MG/1
4 TABLET ORAL
Qty: 100 | Refills: 1 | Status: DISCONTINUED | COMMUNITY
Start: 2018-11-21 | End: 2020-11-10

## 2020-11-10 RX ORDER — LEVOFLOXACIN 500 MG/1
500 TABLET, FILM COATED ORAL DAILY
Qty: 7 | Refills: 0 | Status: DISCONTINUED | COMMUNITY
Start: 2020-06-08 | End: 2020-11-10

## 2020-11-10 RX ORDER — SODIUM CHLORIDE FOR INHALATION 3 %
3 VIAL, NEBULIZER (ML) INHALATION
Qty: 3 | Refills: 1 | Status: DISCONTINUED | COMMUNITY
Start: 2020-07-27 | End: 2020-11-10

## 2020-11-10 RX ORDER — METHYLPREDNISOLONE 32 MG/1
32 TABLET ORAL
Qty: 7 | Refills: 0 | Status: DISCONTINUED | COMMUNITY
Start: 2019-07-18 | End: 2020-11-10

## 2020-11-10 RX ORDER — HYDROXYZINE HYDROCHLORIDE 25 MG/1
25 TABLET ORAL TWICE DAILY
Qty: 30 | Refills: 0 | Status: DISCONTINUED | COMMUNITY
Start: 2020-05-27 | End: 2020-11-10

## 2020-11-10 RX ORDER — DUPILUMAB 300 MG/2ML
300 INJECTION, SOLUTION SUBCUTANEOUS
Qty: 1 | Refills: 6 | Status: DISCONTINUED | COMMUNITY
Start: 2019-10-08 | End: 2020-11-10

## 2020-11-10 RX ORDER — ONDANSETRON 8 MG/1
8 TABLET ORAL TWICE DAILY
Qty: 60 | Refills: 1 | Status: DISCONTINUED | COMMUNITY
Start: 2020-06-05 | End: 2020-11-10

## 2020-11-10 RX ORDER — BACLOFEN 10 MG/1
10 TABLET ORAL 3 TIMES DAILY
Qty: 90 | Refills: 5 | Status: DISCONTINUED | COMMUNITY
Start: 2020-06-24 | End: 2020-11-10

## 2020-11-10 RX ORDER — AZTREONAM 2 G/1
2 INJECTION, POWDER, LYOPHILIZED, FOR SOLUTION INTRAMUSCULAR; INTRAVENOUS 3 TIMES DAILY
Refills: 0 | Status: DISCONTINUED | COMMUNITY
Start: 2020-03-09 | End: 2020-11-10

## 2020-11-11 LAB
25(OH)D3 SERPL-MCNC: 23.6 NG/ML
ALBUMIN SERPL ELPH-MCNC: 4.3 G/DL
ALP BLD-CCNC: 127 U/L
ALT SERPL-CCNC: 13 U/L
ANION GAP SERPL CALC-SCNC: 14 MMOL/L
AST SERPL-CCNC: 18 U/L
BASOPHILS # BLD AUTO: 0.03 K/UL
BASOPHILS NFR BLD AUTO: 0.3 %
BILIRUB SERPL-MCNC: 0.4 MG/DL
BUN SERPL-MCNC: 11 MG/DL
CALCIUM SERPL-MCNC: 9.1 MG/DL
CHLORIDE SERPL-SCNC: 104 MMOL/L
CHOLEST SERPL-MCNC: 138 MG/DL
CO2 SERPL-SCNC: 24 MMOL/L
CREAT SERPL-MCNC: 0.66 MG/DL
CREAT SPEC-SCNC: 170 MG/DL
CREAT/PROT UR: 0.1 RATIO
EOSINOPHIL # BLD AUTO: 0.23 K/UL
EOSINOPHIL NFR BLD AUTO: 2.1 %
ESTIMATED AVERAGE GLUCOSE: 171 MG/DL
FERRITIN SERPL-MCNC: 23 NG/ML
FOLATE SERPL-MCNC: 13.2 NG/ML
GLUCOSE SERPL-MCNC: 145 MG/DL
HBA1C MFR BLD HPLC: 7.6 %
HCT VFR BLD CALC: 41 %
HDLC SERPL-MCNC: 58 MG/DL
HGB BLD-MCNC: 12.3 G/DL
IMM GRANULOCYTES NFR BLD AUTO: 0.5 %
LDLC SERPL CALC-MCNC: 51 MG/DL
LYMPHOCYTES # BLD AUTO: 1.38 K/UL
LYMPHOCYTES NFR BLD AUTO: 12.8 %
MAN DIFF?: NORMAL
MCHC RBC-ENTMCNC: 23.3 PG
MCHC RBC-ENTMCNC: 30 GM/DL
MCV RBC AUTO: 77.5 FL
MONOCYTES # BLD AUTO: 0.76 K/UL
MONOCYTES NFR BLD AUTO: 7 %
NEUTROPHILS # BLD AUTO: 8.37 K/UL
NEUTROPHILS NFR BLD AUTO: 77.3 %
NONHDLC SERPL-MCNC: 80 MG/DL
PLATELET # BLD AUTO: 316 K/UL
POTASSIUM SERPL-SCNC: 4.3 MMOL/L
PROT SERPL-MCNC: 6.7 G/DL
PROT UR-MCNC: 15 MG/DL
RBC # BLD: 5.29 M/UL
RBC # FLD: 15.3 %
SODIUM SERPL-SCNC: 142 MMOL/L
TRIGL SERPL-MCNC: 145 MG/DL
TSH SERPL-ACNC: 1.36 UIU/ML
VIT B12 SERPL-MCNC: 1287 PG/ML
WBC # FLD AUTO: 10.82 K/UL

## 2020-11-12 LAB
APPEARANCE: ABNORMAL
BACTERIA: NEGATIVE
BILIRUBIN URINE: NEGATIVE
BLOOD URINE: NEGATIVE
CALCIUM OXALATE CRYSTALS: ABNORMAL
COLOR: YELLOW
GLUCOSE QUALITATIVE U: NEGATIVE
HYALINE CASTS: 0 /LPF
KETONES URINE: NEGATIVE
LEUKOCYTE ESTERASE URINE: ABNORMAL
MICROSCOPIC-UA: NORMAL
NITRITE URINE: NEGATIVE
PH URINE: 5
PROTEIN URINE: NEGATIVE
RED BLOOD CELLS URINE: 0 /HPF
SPECIFIC GRAVITY URINE: 1.02
SQUAMOUS EPITHELIAL CELLS: 1 /HPF
UROBILINOGEN URINE: NORMAL
WHITE BLOOD CELLS URINE: 3 /HPF

## 2020-11-16 NOTE — PROVIDER CONTACT NOTE (OTHER) - ACTION/TREATMENT ORDERED:
SUBJECTIVE:     Melissa Ashley is a 56 year old female, who is here today with sinus congestion symptoms ongoing for about 5-6 days    The patient does have a history of sinusitis in the past she states like another episode.  She usually gets that around this time of year.  She states that she has had yellowish discharge out of her nose along with sinus pressure and sinus headache for the last 5 days.  She has not documented a fever.  She did have a COVID test done which was negative with the results back today.  She has not been exposed to anybody with COVID as far she knows    She has been trying over-the-counter decongestants along with Flonase nasal spray without much relief    She has had a mild sore throat and mild cough but nothing significant.  She has no shortness of breath.  She has no loss of taste.              REVIEW OF SYSTEMS:  Constitutional: Denies generalized fatigue, fever, sweats or chills  HEENT:  As above  Respiratory: Denies increase difficulty breathing, shortness of breath, hemoptysis, or wheezing  Cardiovascular:  Denies chest pain, pressure, or palpitations  Gastrointestinal:  Denies change in appetite, abdominal pain, nausea, vomiting, diarrhea, constipation, or rectal bleeding.      ALLERGIES:  ALLERGIES:   Allergen Reactions   • Penicillins HIVES and SHORTNESS OF BREATH     Breathing problems    • Bactrim HIVES   • Ceftin HIVES   • Concerta [Methylphenidate Hcl] DIZZINESS     Dizzy spells    • Topamax Other (See Comments)     Ulcer mouth    • Wellbutrin Sr [Budeprion Sr] HIVES and RASH         MEDICATIONS:   Outpatient Medications Marked as Taking for the 11/16/20 encounter (Office Visit) with Randall Rentreia MD   Medication Sig Dispense Refill   • lamoTRIgine (lamictal xr) 25 MG extended-release tablet TAKE 1 TABLET BY MOUTH DAILY 90 tablet 1   • propranolol (INDERAL) 40 MG tablet TAKE 1 TABLET BY MOUTH TWICE DAILY 180 tablet 0   • fexofenadine (ALLEGRA) 180 MG tablet Take 180 mg by mouth  daily.     • fluticasone (FLONASE) 50 MCG/ACT nasal spray Spray 1 spray in each nostril daily. 16 g 5         HISTORIES  Past Medical History:   Diagnosis Date   • Brain tumor (CMS/HCC)    • Gastritis    • Mood disorder (CMS/HCC)    • Tobacco dependence    • Trigeminal neuralgia        OBJECTIVE:  Constitutional:  Well developed, well nourished in no apparent distress, nontoxic appearance   Vitals:    Visit Vitals  /80 (BP Location: RUE - Right upper extremity, Patient Position: Sitting, Cuff Size: Regular)   Pulse 86   Temp 96.7 °F (35.9 °C) (Temporal)   Ht 5' 5.75\" (1.67 m)   Wt 77.2 kg   LMP 02/13/2017   SpO2 98%   BMI 27.66 kg/m²    Body mass index is 27.66 kg/m².  ENT:  Throat not erythematous not exudates.  Tympanic membranes normal bilaterally.  Sinuses were tender to palpation the maxillary regions bilaterally.    Lymph:  No cervical or supraclavicular lymphadenopathy.  Respiratory:  Lungs clear to auscultation. Normal aeration.  Normal respiratory effort.    Cardiovascular:  Regular rate and rhythm.    Psychiatric:  Alert and oriented x 3.  Speech and behavior appropriate.     Assessment and plan:      1. Acute non-recurrent maxillary sinusitis    Patient has had a history of sinusitis in the past and she feels though this is another episode.  Last year she went through 3 different antibiotics over several weeks before she got better.    Plan:  Doxycycline 100 mg p.o. b.i.d. times 10 days  Continue with nasal irrigation and Flonase nasal spray along with over-the-counter decongestants  Humidity/vaporizer    Orders Placed This Encounter   • doxycycline monohydrate (MONODOX) 100 MG capsule       No follow-ups on file.   ALLEY Beyer will check chart.

## 2020-11-17 LAB
ALBUMIN MFR SERPL ELPH: 53.6 %
ALBUMIN SERPL-MCNC: 3.6 G/DL
ALBUMIN/GLOB SERPL: 1.2 RATIO
ALPHA1 GLOB MFR SERPL ELPH: 4.9 %
ALPHA1 GLOB SERPL ELPH-MCNC: 0.3 G/DL
ALPHA2 GLOB MFR SERPL ELPH: 10.1 %
ALPHA2 GLOB SERPL ELPH-MCNC: 0.7 G/DL
B-GLOBULIN MFR SERPL ELPH: 18.5 %
B-GLOBULIN SERPL ELPH-MCNC: 1.2 G/DL
DEPRECATED KAPPA LC FREE/LAMBDA SER: 1.7 RATIO
GAMMA GLOB FLD ELPH-MCNC: 0.9 G/DL
GAMMA GLOB MFR SERPL ELPH: 12.9 %
IGA SER QL IEP: 614 MG/DL
IGG SER QL IEP: 830 MG/DL
IGM SER QL IEP: 138 MG/DL
INTERPRETATION SERPL IEP-IMP: NORMAL
KAPPA LC CSF-MCNC: 1.97 MG/DL
KAPPA LC SERPL-MCNC: 3.35 MG/DL
M PROTEIN MFR SERPL ELPH: NORMAL
M PROTEIN SPEC IFE-MCNC: NORMAL
MONOCLON BAND OBS SERPL: NORMAL
PROT SERPL-MCNC: 6.7 G/DL
PROT SERPL-MCNC: 6.7 G/DL

## 2020-11-18 ENCOUNTER — NON-APPOINTMENT (OUTPATIENT)
Age: 70
End: 2020-11-18

## 2020-11-18 VITALS — WEIGHT: 135 LBS | BODY MASS INDEX: 21.79 KG/M2

## 2020-11-24 ENCOUNTER — NON-APPOINTMENT (OUTPATIENT)
Age: 70
End: 2020-11-24

## 2020-11-30 ENCOUNTER — APPOINTMENT (OUTPATIENT)
Dept: DERMATOLOGY | Facility: CLINIC | Age: 70
End: 2020-11-30
Payer: MEDICARE

## 2020-11-30 VITALS — WEIGHT: 140 LBS | BODY MASS INDEX: 21.97 KG/M2 | HEIGHT: 67 IN

## 2020-11-30 DIAGNOSIS — Z84.0 FAMILY HISTORY OF DISEASES OF THE SKIN AND SUBCUTANEOUS TISSUE: ICD-10-CM

## 2020-11-30 DIAGNOSIS — L71.9 ROSACEA, UNSPECIFIED: ICD-10-CM

## 2020-11-30 DIAGNOSIS — L82.1 OTHER SEBORRHEIC KERATOSIS: ICD-10-CM

## 2020-11-30 DIAGNOSIS — B00.9 HERPESVIRAL INFECTION, UNSPECIFIED: ICD-10-CM

## 2020-11-30 DIAGNOSIS — L72.9 FOLLICULAR CYST OF THE SKIN AND SUBCUTANEOUS TISSUE, UNSPECIFIED: ICD-10-CM

## 2020-11-30 DIAGNOSIS — Z78.9 OTHER SPECIFIED HEALTH STATUS: ICD-10-CM

## 2020-11-30 DIAGNOSIS — Z80.8 FAMILY HISTORY OF MALIGNANT NEOPLASM OF OTHER ORGANS OR SYSTEMS: ICD-10-CM

## 2020-11-30 PROCEDURE — 99203 OFFICE O/P NEW LOW 30 MIN: CPT | Mod: 25

## 2020-11-30 PROCEDURE — 17110 DESTRUCTION B9 LES UP TO 14: CPT

## 2020-12-17 ENCOUNTER — NON-APPOINTMENT (OUTPATIENT)
Age: 70
End: 2020-12-17

## 2020-12-21 ENCOUNTER — APPOINTMENT (OUTPATIENT)
Dept: CARDIOTHORACIC SURGERY | Facility: CLINIC | Age: 70
End: 2020-12-21
Payer: MEDICARE

## 2020-12-21 PROBLEM — Z87.09 HISTORY OF UPPER RESPIRATORY INFECTION: Status: RESOLVED | Noted: 2019-05-02 | Resolved: 2020-12-21

## 2020-12-21 PROBLEM — Z86.19 HISTORY OF CANDIDIASIS OF VAGINA: Status: RESOLVED | Noted: 2019-12-24 | Resolved: 2020-12-21

## 2020-12-21 PROCEDURE — 99204 OFFICE O/P NEW MOD 45 MIN: CPT | Mod: 95

## 2020-12-22 ENCOUNTER — NON-APPOINTMENT (OUTPATIENT)
Age: 70
End: 2020-12-22

## 2020-12-22 NOTE — REVIEW OF SYSTEMS
[Feeling Fatigued] : feeling fatigued [Dyspnea on exertion] : dyspnea during exertion [Lower Ext Edema] : lower extremity edema [see HPI] : see HPI [Negative] : Heme/Lymph

## 2020-12-22 NOTE — HISTORY OF PRESENT ILLNESS
[FreeTextEntry1] : \par This visit was provided via telehealth using real-time 2-way audio visual technology. The patient, RAYRAY RODRIGUEZ, was located at home, [address] [place] [address] , at the time of the visit. The provider was located at [address] [place] [address]. The patient, RAYRAY RODRIGUEZ, Dr. Dex John and MARY AVELAR all participated in the telehealth encounter. Verbal consent given on 12/21/2020 by RAYRAY RODRIGUEZ.\par \par \par 70 year old female with an Aspirin/Contrast Dye Allergy, with a strong family history of Coronary artery disease, history of hyperlipidemia, DM (on insulin), Asthma/COPD (currently on steroids), History of multiple PNAs (followed by Dr. Allison), Tracheobronchomalacia s/p Right VATS, robotic assisted tracheobronchoplasty with Prolene Mesh in 2016 followed by bronchoscopy with Holmium laser on 02/14/2020, history of TIAs, Squamous Cell Carcinoma of the anus s/p radiation/chemo s/p surgical excision in 2019, GERD, Atrial fibrillation (currently on Eliquis), chronic diastolic heart failure with aortic regurgitation who was referred for further evaluation of her valvular disease. \par \par The patient has an extensive pulmonary history and being followed by Dr. Allison and Dr. Zapien. \par \par For the last few months, she has been experiencing worsening dyspnea different than her baseline.  She feels dyspnea with minimal activity. She even gets SOB while walking to the bathroom. She denies any chest pain, palpitations, dizziness, syncope, or LE edema. \par \par The patient underwent an ECHO on 09/28/2020 which showed EF 65%, moderate to severe AI. \par \par The patient lives in an apartment with her sister and remains independent in all her ADLs. \par

## 2020-12-31 ENCOUNTER — APPOINTMENT (OUTPATIENT)
Dept: DERMATOLOGY | Facility: CLINIC | Age: 70
End: 2020-12-31

## 2021-01-02 ENCOUNTER — INPATIENT (INPATIENT)
Facility: HOSPITAL | Age: 71
LOS: 2 days | Discharge: HOME | DRG: 309 | End: 2021-01-05
Attending: INTERNAL MEDICINE | Admitting: INTERNAL MEDICINE
Payer: MEDICARE

## 2021-01-02 VITALS
HEIGHT: 67 IN | DIASTOLIC BLOOD PRESSURE: 90 MMHG | SYSTOLIC BLOOD PRESSURE: 144 MMHG | HEART RATE: 92 BPM | TEMPERATURE: 98 F | OXYGEN SATURATION: 100 % | RESPIRATION RATE: 20 BRPM | WEIGHT: 136.91 LBS

## 2021-01-02 DIAGNOSIS — I48.0 PAROXYSMAL ATRIAL FIBRILLATION: ICD-10-CM

## 2021-01-02 DIAGNOSIS — Z98.89 OTHER SPECIFIED POSTPROCEDURAL STATES: Chronic | ICD-10-CM

## 2021-01-02 DIAGNOSIS — H05.352 EXOSTOSIS OF LEFT ORBIT: Chronic | ICD-10-CM

## 2021-01-02 DIAGNOSIS — J39.8 OTHER SPECIFIED DISEASES OF UPPER RESPIRATORY TRACT: ICD-10-CM

## 2021-01-02 DIAGNOSIS — G45.9 TRANSIENT CEREBRAL ISCHEMIC ATTACK, UNSPECIFIED: ICD-10-CM

## 2021-01-02 DIAGNOSIS — E11.9 TYPE 2 DIABETES MELLITUS WITHOUT COMPLICATIONS: ICD-10-CM

## 2021-01-02 DIAGNOSIS — E27.40 UNSPECIFIED ADRENOCORTICAL INSUFFICIENCY: ICD-10-CM

## 2021-01-02 DIAGNOSIS — J45.909 UNSPECIFIED ASTHMA, UNCOMPLICATED: ICD-10-CM

## 2021-01-02 DIAGNOSIS — K62.5 HEMORRHAGE OF ANUS AND RECTUM: Chronic | ICD-10-CM

## 2021-01-02 DIAGNOSIS — I35.1 NONRHEUMATIC AORTIC (VALVE) INSUFFICIENCY: ICD-10-CM

## 2021-01-02 DIAGNOSIS — Z96.653 PRESENCE OF ARTIFICIAL KNEE JOINT, BILATERAL: Chronic | ICD-10-CM

## 2021-01-02 DIAGNOSIS — Z87.09 PERSONAL HISTORY OF OTHER DISEASES OF THE RESPIRATORY SYSTEM: Chronic | ICD-10-CM

## 2021-01-02 DIAGNOSIS — Z98.890 OTHER SPECIFIED POSTPROCEDURAL STATES: Chronic | ICD-10-CM

## 2021-01-02 LAB
ALBUMIN SERPL ELPH-MCNC: 3.7 G/DL — SIGNIFICANT CHANGE UP (ref 3.3–5)
ALBUMIN SERPL ELPH-MCNC: 3.9 G/DL — SIGNIFICANT CHANGE UP (ref 3.3–5)
ALP SERPL-CCNC: SIGNIFICANT CHANGE UP U/L (ref 40–120)
ALP SERPL-CCNC: SIGNIFICANT CHANGE UP U/L (ref 40–120)
ALT FLD-CCNC: SIGNIFICANT CHANGE UP U/L (ref 10–45)
ALT FLD-CCNC: SIGNIFICANT CHANGE UP U/L (ref 10–45)
ANION GAP SERPL CALC-SCNC: 10 MMOL/L — SIGNIFICANT CHANGE UP (ref 5–17)
ANION GAP SERPL CALC-SCNC: 10 MMOL/L — SIGNIFICANT CHANGE UP (ref 5–17)
AST SERPL-CCNC: SIGNIFICANT CHANGE UP U/L (ref 10–40)
AST SERPL-CCNC: SIGNIFICANT CHANGE UP U/L (ref 10–40)
BASOPHILS # BLD AUTO: 0.03 K/UL — SIGNIFICANT CHANGE UP (ref 0–0.2)
BASOPHILS NFR BLD AUTO: 0.3 % — SIGNIFICANT CHANGE UP (ref 0–2)
BILIRUB SERPL-MCNC: <0.2 MG/DL — SIGNIFICANT CHANGE UP (ref 0.2–1.2)
BILIRUB SERPL-MCNC: <0.2 MG/DL — SIGNIFICANT CHANGE UP (ref 0.2–1.2)
BUN SERPL-MCNC: 14 MG/DL — SIGNIFICANT CHANGE UP (ref 7–23)
BUN SERPL-MCNC: 14 MG/DL — SIGNIFICANT CHANGE UP (ref 7–23)
CALCIUM SERPL-MCNC: 8.8 MG/DL — SIGNIFICANT CHANGE UP (ref 8.4–10.5)
CALCIUM SERPL-MCNC: 8.9 MG/DL — SIGNIFICANT CHANGE UP (ref 8.4–10.5)
CHLORIDE SERPL-SCNC: 101 MMOL/L — SIGNIFICANT CHANGE UP (ref 96–108)
CHLORIDE SERPL-SCNC: 99 MMOL/L — SIGNIFICANT CHANGE UP (ref 96–108)
CO2 SERPL-SCNC: 28 MMOL/L — SIGNIFICANT CHANGE UP (ref 22–31)
CO2 SERPL-SCNC: 29 MMOL/L — SIGNIFICANT CHANGE UP (ref 22–31)
CREAT SERPL-MCNC: 0.6 MG/DL — SIGNIFICANT CHANGE UP (ref 0.5–1.3)
CREAT SERPL-MCNC: 0.63 MG/DL — SIGNIFICANT CHANGE UP (ref 0.5–1.3)
EOSINOPHIL # BLD AUTO: 0.08 K/UL — SIGNIFICANT CHANGE UP (ref 0–0.5)
EOSINOPHIL NFR BLD AUTO: 0.8 % — SIGNIFICANT CHANGE UP (ref 0–6)
GLUCOSE BLDC GLUCOMTR-MCNC: 114 MG/DL — HIGH (ref 70–99)
GLUCOSE BLDC GLUCOMTR-MCNC: 153 MG/DL — HIGH (ref 70–99)
GLUCOSE SERPL-MCNC: 145 MG/DL — HIGH (ref 70–99)
GLUCOSE SERPL-MCNC: 191 MG/DL — HIGH (ref 70–99)
HCT VFR BLD CALC: 41.6 % — SIGNIFICANT CHANGE UP (ref 34.5–45)
HGB BLD-MCNC: 12.5 G/DL — SIGNIFICANT CHANGE UP (ref 11.5–15.5)
IMM GRANULOCYTES NFR BLD AUTO: 0.6 % — SIGNIFICANT CHANGE UP (ref 0–1.5)
LYMPHOCYTES # BLD AUTO: 1.1 K/UL — SIGNIFICANT CHANGE UP (ref 1–3.3)
LYMPHOCYTES # BLD AUTO: 10.4 % — LOW (ref 13–44)
MCHC RBC-ENTMCNC: 22.5 PG — LOW (ref 27–34)
MCHC RBC-ENTMCNC: 30 GM/DL — LOW (ref 32–36)
MCV RBC AUTO: 75 FL — LOW (ref 80–100)
MONOCYTES # BLD AUTO: 0.56 K/UL — SIGNIFICANT CHANGE UP (ref 0–0.9)
MONOCYTES NFR BLD AUTO: 5.3 % — SIGNIFICANT CHANGE UP (ref 2–14)
NEUTROPHILS # BLD AUTO: 8.77 K/UL — HIGH (ref 1.8–7.4)
NEUTROPHILS NFR BLD AUTO: 82.6 % — HIGH (ref 43–77)
NRBC # BLD: 0 /100 WBCS — SIGNIFICANT CHANGE UP (ref 0–0)
PLATELET # BLD AUTO: 304 K/UL — SIGNIFICANT CHANGE UP (ref 150–400)
POTASSIUM SERPL-MCNC: SIGNIFICANT CHANGE UP MMOL/L (ref 3.5–5.3)
POTASSIUM SERPL-SCNC: SIGNIFICANT CHANGE UP MMOL/L (ref 3.5–5.3)
PROT SERPL-MCNC: 6.9 G/DL — SIGNIFICANT CHANGE UP (ref 6–8.3)
PROT SERPL-MCNC: 7.3 G/DL — SIGNIFICANT CHANGE UP (ref 6–8.3)
RBC # BLD: 5.55 M/UL — HIGH (ref 3.8–5.2)
RBC # FLD: 16.6 % — HIGH (ref 10.3–14.5)
SARS-COV-2 RNA SPEC QL NAA+PROBE: SIGNIFICANT CHANGE UP
SODIUM SERPL-SCNC: 137 MMOL/L — SIGNIFICANT CHANGE UP (ref 135–145)
SODIUM SERPL-SCNC: 140 MMOL/L — SIGNIFICANT CHANGE UP (ref 135–145)
TROPONIN T SERPL-MCNC: <0.01 NG/ML — SIGNIFICANT CHANGE UP (ref 0–0.01)
WBC # BLD: 10.6 K/UL — HIGH (ref 3.8–10.5)
WBC # FLD AUTO: 10.6 K/UL — HIGH (ref 3.8–10.5)

## 2021-01-02 PROCEDURE — 71045 X-RAY EXAM CHEST 1 VIEW: CPT | Mod: 26

## 2021-01-02 PROCEDURE — 93010 ELECTROCARDIOGRAM REPORT: CPT

## 2021-01-02 PROCEDURE — 99285 EMERGENCY DEPT VISIT HI MDM: CPT

## 2021-01-02 RX ORDER — DEXTROSE 50 % IN WATER 50 %
15 SYRINGE (ML) INTRAVENOUS ONCE
Refills: 0 | Status: DISCONTINUED | OUTPATIENT
Start: 2021-01-02 | End: 2021-01-05

## 2021-01-02 RX ORDER — TIOTROPIUM BROMIDE 18 UG/1
1 CAPSULE ORAL; RESPIRATORY (INHALATION) DAILY
Refills: 0 | Status: DISCONTINUED | OUTPATIENT
Start: 2021-01-02 | End: 2021-01-05

## 2021-01-02 RX ORDER — DEXTROSE 50 % IN WATER 50 %
25 SYRINGE (ML) INTRAVENOUS ONCE
Refills: 0 | Status: DISCONTINUED | OUTPATIENT
Start: 2021-01-02 | End: 2021-01-05

## 2021-01-02 RX ORDER — ATORVASTATIN CALCIUM 80 MG/1
20 TABLET, FILM COATED ORAL AT BEDTIME
Refills: 0 | Status: DISCONTINUED | OUTPATIENT
Start: 2021-01-02 | End: 2021-01-05

## 2021-01-02 RX ORDER — SODIUM CHLORIDE 9 MG/ML
1000 INJECTION, SOLUTION INTRAVENOUS
Refills: 0 | Status: DISCONTINUED | OUTPATIENT
Start: 2021-01-02 | End: 2021-01-05

## 2021-01-02 RX ORDER — INSULIN GLARGINE 100 [IU]/ML
30 INJECTION, SOLUTION SUBCUTANEOUS AT BEDTIME
Refills: 0 | Status: DISCONTINUED | OUTPATIENT
Start: 2021-01-02 | End: 2021-01-05

## 2021-01-02 RX ORDER — ALBUTEROL 90 UG/1
2 AEROSOL, METERED ORAL EVERY 6 HOURS
Refills: 0 | Status: DISCONTINUED | OUTPATIENT
Start: 2021-01-02 | End: 2021-01-03

## 2021-01-02 RX ORDER — GLUCAGON INJECTION, SOLUTION 0.5 MG/.1ML
1 INJECTION, SOLUTION SUBCUTANEOUS ONCE
Refills: 0 | Status: DISCONTINUED | OUTPATIENT
Start: 2021-01-02 | End: 2021-01-05

## 2021-01-02 RX ORDER — PANTOPRAZOLE SODIUM 20 MG/1
40 TABLET, DELAYED RELEASE ORAL
Refills: 0 | Status: DISCONTINUED | OUTPATIENT
Start: 2021-01-02 | End: 2021-01-05

## 2021-01-02 RX ORDER — DIGOXIN 250 MCG
0.25 TABLET ORAL DAILY
Refills: 0 | Status: DISCONTINUED | OUTPATIENT
Start: 2021-01-02 | End: 2021-01-05

## 2021-01-02 RX ORDER — BUDESONIDE, MICRONIZED 100 %
0.5 POWDER (GRAM) MISCELLANEOUS
Refills: 0 | Status: DISCONTINUED | OUTPATIENT
Start: 2021-01-02 | End: 2021-01-05

## 2021-01-02 RX ORDER — MONTELUKAST 4 MG/1
10 TABLET, CHEWABLE ORAL DAILY
Refills: 0 | Status: DISCONTINUED | OUTPATIENT
Start: 2021-01-02 | End: 2021-01-05

## 2021-01-02 RX ORDER — INSULIN LISPRO 100/ML
VIAL (ML) SUBCUTANEOUS
Refills: 0 | Status: DISCONTINUED | OUTPATIENT
Start: 2021-01-02 | End: 2021-01-05

## 2021-01-02 RX ORDER — APIXABAN 2.5 MG/1
5 TABLET, FILM COATED ORAL EVERY 12 HOURS
Refills: 0 | Status: DISCONTINUED | OUTPATIENT
Start: 2021-01-02 | End: 2021-01-05

## 2021-01-02 RX ORDER — ALBUTEROL 90 UG/1
1 AEROSOL, METERED ORAL ONCE
Refills: 0 | Status: COMPLETED | OUTPATIENT
Start: 2021-01-02 | End: 2021-01-02

## 2021-01-02 RX ORDER — DEXTROSE 50 % IN WATER 50 %
12.5 SYRINGE (ML) INTRAVENOUS ONCE
Refills: 0 | Status: DISCONTINUED | OUTPATIENT
Start: 2021-01-02 | End: 2021-01-05

## 2021-01-02 RX ADMIN — ALBUTEROL 2 PUFF(S): 90 AEROSOL, METERED ORAL at 22:17

## 2021-01-02 RX ADMIN — ALBUTEROL 1 PUFF(S): 90 AEROSOL, METERED ORAL at 17:45

## 2021-01-02 RX ADMIN — ATORVASTATIN CALCIUM 20 MILLIGRAM(S): 80 TABLET, FILM COATED ORAL at 22:17

## 2021-01-02 RX ADMIN — APIXABAN 5 MILLIGRAM(S): 2.5 TABLET, FILM COATED ORAL at 22:17

## 2021-01-02 RX ADMIN — INSULIN GLARGINE 30 UNIT(S): 100 INJECTION, SOLUTION SUBCUTANEOUS at 22:17

## 2021-01-02 RX ADMIN — MONTELUKAST 10 MILLIGRAM(S): 4 TABLET, CHEWABLE ORAL at 22:17

## 2021-01-02 RX ADMIN — Medication 0.5 MILLIGRAM(S): at 22:17

## 2021-01-02 NOTE — ED PROVIDER NOTE - CROS ED CARDIOVAS ALL NEG
[facility]    Physical Therapy Daily Treatment Note  Date:  2019    Patient Name:  Kalee Ledesma    :  1963  MRN: [id number]  BN(56518) x  2   [] IONTO  [] NMR (97408) x      [] VASO   [x] Manual (87615) x  1    [] Other:  [] TA x       [] Mech Traction (53040)  [] ES(attended) (38126)      [] ES (un) (86120):     GOALS:  Patient stated goal: Wants to return to running     Therapist - - -

## 2021-01-02 NOTE — ED PROVIDER NOTE - CLINICAL SUMMARY MEDICAL DECISION MAKING FREE TEXT BOX
palpitations, dizziness and chest discomfort. pt well appearing. vss. ecg nsr. no cp at this time. cxr no acute pathology. labs noted. troponin negative. attempted to contact Dr Leahy. Plan for admission to cardiology.

## 2021-01-02 NOTE — H&P ADULT - PROBLEM SELECTOR PLAN 2
Check 2D echo to eval for progression of AI Check 2D echo to eval for progression of AI  Consider adding procardia  Structural heart eval

## 2021-01-02 NOTE — ED PROVIDER NOTE - OBJECTIVE STATEMENT
70F w tracheobronchomalacia s/p tracheobronchoplasty in October, on chronic low dose prednisone with subsequent adrenal insufficiency, severe allergic asthma on dupilumuab (IL4 antagonist, MAB), bronchiectasis on inhaled tobramycin (started two weeks ago), lung nodules, A-fib on eliquis, T2DM, HTN, squamous cell CA of anus s/p chemo/XRT in 2017 c/o palpitations, dizziness and chest discomfort. pt notes sx's began 3 hrs prior to arrival. pt states was just not feeling right. no sob, abd pain, n/v. no ha. no visual changes. pt notes EMS called and + a fib at time. no leg pain or swelling. no further complaints. 70F w tracheobronchomalacia s/p tracheobronchoplasty in October, on chronic low dose prednisone with subsequent adrenal insufficiency, severe allergic asthma on dupilumuab (IL4 antagonist, MAB), bronchiectasis on inhaled tobramycin (started two weeks ago), lung nodules, A-fib on eliquis, T2DM, HTN, squamous cell CA of anus s/p chemo/XRT in 2017 c/o palpitations, dizziness and chest discomfort. pt notes sx's began 3 hrs prior to arrival. pt states was just not feeling right and her BP was elevated and HR low at home. no sob, abd pain, n/v. no ha. no visual changes. pt notes EMS called and + a fib at time. no leg pain or swelling. no further complaints.

## 2021-01-02 NOTE — ED PROVIDER NOTE - PSH
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  H/O pelvic surgery  5 years ago - s/p fracture  H/O total knee replacement, bilateral  5 years ago  History of partial hysterectomy  30 years ago - fibroids  History of sinus surgery  multiple sinus surgeries  History of tracheomalacia  2015 - attempted tracheal stenting ([facility])- course complicated by obstruction, respiratory failure, multiple CPR attempts -  stent discontinued; 10/20/2016 Tracheobronchoplasty (Prolene Mesh) performed at [facility] by Dr Zapien  Rectal bleeding  exam under anesthesia ([facility]) 2/2018  S/P bronchoscopy  6/5/2018 - [facility] (Dr Zapien) no evidence of tracheobronchomalacia in trachea or bronchial tubes

## 2021-01-02 NOTE — H&P ADULT - ASSESSMENT
Patient is a 70 year old female with cardiac history significant for PAF maintained on Eliquis at home who presented to the ED with complaints of palpitations and chest pain. Found to be in AF with RVR by EMS on arrival to site. Patient converted to SR prior to arrival and now symptoms resolved. Patient being admitted to tele to R/O ACS

## 2021-01-02 NOTE — ED ADULT TRIAGE NOTE - CHIEF COMPLAINT QUOTE
"I have palpitations, and shortness of breath". as per EMS patient had onset of sob and palpitations 2-3 hrs before their arrival. Pt has hx of atrial fibrillation.

## 2021-01-02 NOTE — H&P ADULT - PROBLEM SELECTOR PLAN 1
Episode of AF with RVR in which she was symptomatic.   Spontaneous converison to SR   Cont PO Eliquis for thromboembolic prophylaxis  Serial trops x 3 to r/o ACS  Check 2D echo  Check TSH  Consider AA therapy however previous with moderate LA dilation-realistically PVI may be a better option long term

## 2021-01-02 NOTE — H&P ADULT - NSICDXPASTSURGICALHX_GEN_ALL_CORE_FT
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    H/O pelvic surgery 5 years ago - s/p fracture    H/O total knee replacement, bilateral 5 years ago    History of partial hysterectomy 30 years ago - fibroids    History of sinus surgery multiple sinus surgeries    History of tracheomalacia 2015 - attempted tracheal stenting ([facility])- course complicated by obstruction, respiratory failure, multiple CPR attempts -  stent discontinued; 10/20/2016 Tracheobronchoplasty (Prolene Mesh) performed at [facility] by Dr Zapien    Rectal bleeding exam under anesthesia ([facility]) 2/2018    S/P bronchoscopy 6/5/2018 - [facility] (Dr Zapien) no evidence of tracheobronchomalacia in trachea or bronchial tubes

## 2021-01-02 NOTE — ED ADULT NURSE NOTE - PSH
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  H/O pelvic surgery  5 years ago - s/p fracture  H/O total knee replacement, bilateral  5 years ago  History of partial hysterectomy  30 years ago - fibroids  History of sinus surgery  multiple sinus surgeries  History of tracheomalacia  2015 - attempted tracheal stenting ([facility])- course complicated by obstruction, respiratory failure, multiple CPR attempts -  stent discontinued; 10/20/2016 Tracheobronchoplasty (Prolene Mesh) performed at [facility] by Dr Zapien  Rectal bleeding  exam under anesthesia ([facility]) 2/2018  S/P bronchoscopy  6/5/2018 - [facility] (Dr Zapien) no evidence of tracheobronchomalacia in trachea or bronchial tubes

## 2021-01-02 NOTE — H&P ADULT - ATTENDING COMMENTS
Initial attending contact date 1/3 on morning bedside rounds. See PA note written above, for details. I reviewed the PA documentation.  I have personally seen and examined this patient today. I reviewed vitals, labs, medications, cardiac studies and additional imaging.  I agree with the PA's findings and plans as written above with the following additions/amendments:  Patient with pAfib, now in NSR rate controlled. No chest pain. Troponin neg serial.  +dyspnea with active wheezing and cough on exam  Plan for:  Nebulizer therapy  Pulm consult given active bronchospasm  Obtain TTE for cardiac structural assessment  CTSx structural team consult (pt known to Alvaro) for AI work up  -->planned for outpt PAT and cMRI, which can be done as inpatient given admission  Digoxin 250mcg daily home dose  Moderate intensity statin Atorva 20  Eliquis 5 BID for Afib CVA risk reduction  EP consult to consider ablation given s/sx pAfib RVR  PT c/s: Patient to be referred to cardiac rehab upon discharge for cardiac conditioning  KEDAR Webb.  Cardiology Attending

## 2021-01-02 NOTE — H&P ADULT - PROBLEM SELECTOR PLAN 7
Cont dupilumuab [id number]  Multiple significant allergies, cautious use of new medications Cont dupilumuab [id number]  Multiple significant allergies, cautious use of new medications  Cont Nebz

## 2021-01-02 NOTE — ED PROVIDER NOTE - ATTENDING CONTRIBUTION TO CARE
69 yo f w tracheobronchial malacia, adrenal insuff, asthma, a fib (eliquis), CA presents to ED with concern for palpitations, cp and dizziness.  Notes symptoms began 3 hours pta.  Notes she wasn't feeling right today.  No fever or chills.  On exam, patient is non toxic.  HRRR, lungs with scant wheezes, bilaterally.  No peripheral edema.  Will check labs, CXR, give nebs and consult with patient's cardiology team.  EKG NSR, though EMS noted EKG with afib on their arrival to her home.  Will complete work up, consultation and dispo accordingly.

## 2021-01-02 NOTE — H&P ADULT - HISTORY OF PRESENT ILLNESS
Patient is a 70 year old female with PMHx of tracheobronchomalacia s/p tracheobronchoplasty, adrenal insufficiency on low dose prednisone, severe allergic asthma on dupilumuab (IL4 antagonist, MAB), bronchiectasis on inhaled tobramycin (started two weeks ago), lung nodules, squamous cell CA of anus s/p chemo/XRT in 2017, Moderate- Severe AR with normal LVEF and Mod LA dilation by 2D echo in August 2019, Paroxysmal atrial fibrillation on eliquis, T2DM, HTN, who presents to the [facility] ED with complaints of palpitations and dizziness at how with elevated HR. She reports an episode of chest pain in association with palpitations. Pain was described as a sharp pain in the left breast with no radiation. On arrival by EMS their reports document patient to be in atrial fibrillation with RVR. EKGs not available for review. She spontaneously converted to SR on route and symptoms resolved. Patient denies any associated SOB, orthopnea, PND, LE edema or syncope. 12 Lead EKG revealed SR with poor R-R progression and NSST changes. Troponin was negative x1.

## 2021-01-02 NOTE — ED ADULT NURSE NOTE - OBJECTIVE STATEMENT
BIBEMS for episode of HTN w/ palpitations a few hours PTA, currently resolved  Pt endorses at time of event also felt "my heart beating in my head. I just didn't feel right"

## 2021-01-02 NOTE — H&P ADULT - NSHPLABSRESULTS_GEN_ALL_CORE
Troponin T, Serum (01.02.21 @ 16:38)    Troponin T, Serum: <0.01: Reference interval for troponin T is </= 0.01 ng/mL which includes the  99th percentile of a healthy population. Troponin T results are not  interchangeable with troponin I results. ng/mL

## 2021-01-02 NOTE — H&P ADULT - PROBLEM/PLAN-1
Patient is calling because she is still having a problem with her right ear. She stated that she did what ENT had suggested and its still not helping she would like to know what else Dr suggests.   DISPLAY PLAN FREE TEXT

## 2021-01-02 NOTE — H&P ADULT - GASTROINTESTINAL DETAILS
soft/nontender/no distention/no masses palpable/bowel sounds normal/no bruit/no rebound tenderness
PATIENT NEEDS ASSISTANCE TO LEAVE RESIDENCE:

## 2021-01-03 LAB
A1C WITH ESTIMATED AVERAGE GLUCOSE RESULT: 7.6 % — HIGH (ref 4–5.6)
ANION GAP SERPL CALC-SCNC: 10 MMOL/L — SIGNIFICANT CHANGE UP (ref 5–17)
BUN SERPL-MCNC: 13 MG/DL — SIGNIFICANT CHANGE UP (ref 7–23)
CALCIUM SERPL-MCNC: 9 MG/DL — SIGNIFICANT CHANGE UP (ref 8.4–10.5)
CHLORIDE SERPL-SCNC: 103 MMOL/L — SIGNIFICANT CHANGE UP (ref 96–108)
CO2 SERPL-SCNC: 26 MMOL/L — SIGNIFICANT CHANGE UP (ref 22–31)
CREAT SERPL-MCNC: 0.6 MG/DL — SIGNIFICANT CHANGE UP (ref 0.5–1.3)
ESTIMATED AVERAGE GLUCOSE: 171 MG/DL — HIGH (ref 68–114)
GLUCOSE BLDC GLUCOMTR-MCNC: 119 MG/DL — HIGH (ref 70–99)
GLUCOSE BLDC GLUCOMTR-MCNC: 149 MG/DL — HIGH (ref 70–99)
GLUCOSE BLDC GLUCOMTR-MCNC: 186 MG/DL — HIGH (ref 70–99)
GLUCOSE BLDC GLUCOMTR-MCNC: 74 MG/DL — SIGNIFICANT CHANGE UP (ref 70–99)
GLUCOSE SERPL-MCNC: 57 MG/DL — LOW (ref 70–99)
HCT VFR BLD CALC: 39 % — SIGNIFICANT CHANGE UP (ref 34.5–45)
HGB BLD-MCNC: 11.6 G/DL — SIGNIFICANT CHANGE UP (ref 11.5–15.5)
MAGNESIUM SERPL-MCNC: 2.2 MG/DL — SIGNIFICANT CHANGE UP (ref 1.6–2.6)
MCHC RBC-ENTMCNC: 22.7 PG — LOW (ref 27–34)
MCHC RBC-ENTMCNC: 29.7 GM/DL — LOW (ref 32–36)
MCV RBC AUTO: 76.2 FL — LOW (ref 80–100)
NRBC # BLD: 0 /100 WBCS — SIGNIFICANT CHANGE UP (ref 0–0)
PLATELET # BLD AUTO: 275 K/UL — SIGNIFICANT CHANGE UP (ref 150–400)
POTASSIUM SERPL-MCNC: 4.3 MMOL/L — SIGNIFICANT CHANGE UP (ref 3.5–5.3)
POTASSIUM SERPL-SCNC: 4.3 MMOL/L — SIGNIFICANT CHANGE UP (ref 3.5–5.3)
RBC # BLD: 5.12 M/UL — SIGNIFICANT CHANGE UP (ref 3.8–5.2)
RBC # FLD: 16.4 % — HIGH (ref 10.3–14.5)
SODIUM SERPL-SCNC: 139 MMOL/L — SIGNIFICANT CHANGE UP (ref 135–145)
T4 AB SER-ACNC: 5.87 UG/DL — SIGNIFICANT CHANGE UP (ref 3.17–11.72)
TROPONIN T SERPL-MCNC: <0.01 NG/ML — SIGNIFICANT CHANGE UP (ref 0–0.01)
TSH SERPL-MCNC: 0.67 UIU/ML — SIGNIFICANT CHANGE UP (ref 0.35–4.94)
WBC # BLD: 9.06 K/UL — SIGNIFICANT CHANGE UP (ref 3.8–10.5)
WBC # FLD AUTO: 9.06 K/UL — SIGNIFICANT CHANGE UP (ref 3.8–10.5)

## 2021-01-03 PROCEDURE — 99233 SBSQ HOSP IP/OBS HIGH 50: CPT

## 2021-01-03 PROCEDURE — 99222 1ST HOSP IP/OBS MODERATE 55: CPT

## 2021-01-03 RX ORDER — ALBUTEROL 90 UG/1
1 AEROSOL, METERED ORAL EVERY 4 HOURS
Refills: 0 | Status: DISCONTINUED | OUTPATIENT
Start: 2021-01-03 | End: 2021-01-05

## 2021-01-03 RX ORDER — POLYETHYLENE GLYCOL 3350 17 G/17G
17 POWDER, FOR SOLUTION ORAL ONCE
Refills: 0 | Status: COMPLETED | OUTPATIENT
Start: 2021-01-03 | End: 2021-01-03

## 2021-01-03 RX ORDER — TIOTROPIUM BROMIDE 18 UG/1
1 CAPSULE ORAL; RESPIRATORY (INHALATION) DAILY
Refills: 0 | Status: DISCONTINUED | OUTPATIENT
Start: 2021-01-03 | End: 2021-01-05

## 2021-01-03 RX ORDER — POLYETHYLENE GLYCOL 3350 17 G/17G
17 POWDER, FOR SOLUTION ORAL
Refills: 0 | Status: DISCONTINUED | OUTPATIENT
Start: 2021-01-03 | End: 2021-01-05

## 2021-01-03 RX ORDER — ACETYLCYSTEINE 200 MG/ML
6 VIAL (ML) MISCELLANEOUS THREE TIMES A DAY
Refills: 0 | Status: COMPLETED | OUTPATIENT
Start: 2021-01-03 | End: 2021-01-04

## 2021-01-03 RX ORDER — IPRATROPIUM/ALBUTEROL SULFATE 18-103MCG
3 AEROSOL WITH ADAPTER (GRAM) INHALATION EVERY 6 HOURS
Refills: 0 | Status: DISCONTINUED | OUTPATIENT
Start: 2021-01-03 | End: 2021-01-05

## 2021-01-03 RX ADMIN — Medication 4 MILLIGRAM(S): at 07:14

## 2021-01-03 RX ADMIN — APIXABAN 5 MILLIGRAM(S): 2.5 TABLET, FILM COATED ORAL at 05:53

## 2021-01-03 RX ADMIN — Medication 1: at 21:49

## 2021-01-03 RX ADMIN — POLYETHYLENE GLYCOL 3350 17 GRAM(S): 17 POWDER, FOR SOLUTION ORAL at 07:14

## 2021-01-03 RX ADMIN — TIOTROPIUM BROMIDE 1 CAPSULE(S): 18 CAPSULE ORAL; RESPIRATORY (INHALATION) at 05:55

## 2021-01-03 RX ADMIN — POLYETHYLENE GLYCOL 3350 17 GRAM(S): 17 POWDER, FOR SOLUTION ORAL at 21:48

## 2021-01-03 RX ADMIN — Medication 6 MILLILITER(S): at 22:31

## 2021-01-03 RX ADMIN — APIXABAN 5 MILLIGRAM(S): 2.5 TABLET, FILM COATED ORAL at 17:20

## 2021-01-03 RX ADMIN — ALBUTEROL 2 PUFF(S): 90 AEROSOL, METERED ORAL at 05:57

## 2021-01-03 RX ADMIN — PANTOPRAZOLE SODIUM 40 MILLIGRAM(S): 20 TABLET, DELAYED RELEASE ORAL at 05:53

## 2021-01-03 RX ADMIN — Medication 0.5 MILLIGRAM(S): at 05:54

## 2021-01-03 RX ADMIN — Medication 0.25 MILLIGRAM(S): at 05:53

## 2021-01-03 RX ADMIN — Medication 0.5 MILLIGRAM(S): at 17:19

## 2021-01-03 RX ADMIN — Medication 3 MILLILITER(S): at 16:26

## 2021-01-03 RX ADMIN — INSULIN GLARGINE 30 UNIT(S): 100 INJECTION, SOLUTION SUBCUTANEOUS at 21:49

## 2021-01-03 RX ADMIN — ATORVASTATIN CALCIUM 20 MILLIGRAM(S): 80 TABLET, FILM COATED ORAL at 21:32

## 2021-01-03 RX ADMIN — Medication 3 MILLILITER(S): at 21:32

## 2021-01-03 NOTE — PROGRESS NOTE ADULT - PROBLEM SELECTOR PLAN 2
Moderate-severe by ECHO 9/28/20 (see full report above)  -Follows with Dr John and CTS structural consulted   -repeat ECHO, stress ECHO and cardiac MRI ordered

## 2021-01-03 NOTE — PROGRESS NOTE ADULT - SUBJECTIVE AND OBJECTIVE BOX
Interventional Cardiology PA Adult Progress Note    Subjective Assessment: pt seen and examined at bedside this am. No complaints at this time. Denies CP, SOB, palpitations, dizziness.     ROS negative except for subjective and HPI   	  MEDICATIONS:  digoxin     Tablet 0.25 milliGRAM(s) Oral daily      ALBUTerol    90 MICROgram(s) HFA Inhaler 1 Puff(s) Inhalation every 4 hours  albuterol/ipratropium for Nebulization 3 milliLiter(s) Nebulizer every 6 hours  buDESOnide    Inhalation Suspension 0.5 milliGRAM(s) Inhalation two times a day  montelukast 10 milliGRAM(s) Oral daily  tiotropium 18 MICROgram(s) Capsule 1 Capsule(s) Inhalation daily  tiotropium 18 MICROgram(s) Capsule 1 Capsule(s) Inhalation daily      pantoprazole    Tablet 40 milliGRAM(s) Oral before breakfast  polyethylene glycol 3350 17 Gram(s) Oral two times a day    atorvastatin 20 milliGRAM(s) Oral at bedtime  dextrose 40% Gel 15 Gram(s) Oral once  dextrose 50% Injectable 25 Gram(s) IV Push once  dextrose 50% Injectable 12.5 Gram(s) IV Push once  dextrose 50% Injectable 25 Gram(s) IV Push once  glucagon  Injectable 1 milliGRAM(s) IntraMuscular once  insulin glargine Injectable (LANTUS) 30 Unit(s) SubCutaneous at bedtime  insulin lispro (ADMELOG) corrective regimen sliding scale   SubCutaneous Before meals and at bedtime  methylPREDNISolone 4 milliGRAM(s) Oral daily    apixaban 5 milliGRAM(s) Oral every 12 hours  dextrose 5%. 1000 milliLiter(s) IV Continuous <Continuous>  dextrose 5%. 1000 milliLiter(s) IV Continuous <Continuous>      	    [PHYSICAL EXAM:  TELEMETRY:  T(C): 36.3 (01-03-21 @ 09:07), Max: 36.8 (01-02-21 @ 14:49)  HR: 59 (01-03-21 @ 08:10) (59 - 92)  BP: 146/64 (01-03-21 @ 08:10) (130/58 - 171/72)  RR: 18 (01-03-21 @ 08:10) (14 - 20)  SpO2: 98% (01-03-21 @ 08:10) (95% - 100%)  Wt(kg): --  I&O's Summary    02 Jan 2021 07:01  -  03 Jan 2021 07:00  --------------------------------------------------------  IN: 420 mL / OUT: 0 mL / NET: 420 mL    03 Jan 2021 07:01  -  03 Jan 2021 12:22  --------------------------------------------------------  IN: 180 mL / OUT: 0 mL / NET: 180 mL      Height (cm): 170.2 (01-02 @ 14:49)  Weight (kg): 62.1 (01-02 @ 14:49)  BMI (kg/m2): 21.4 (01-02 @ 14:49)  BSA (m2): 1.72 (01-02 @ 14:49)  A[place]zcua:  Central/PICC/Mid Line:                                         Appearance: Normal	  HEENT:   Normal oral mucosa, PERRL, EOMI	  Neck: Supple, - JVD  Cardiovascular: Normal S1 S2, No JVD, No murmurs  Respiratory: diffuse wheezing 	  Gastrointestinal:  Soft, Non-tender, + BS	  Skin: No rashes, No ecchymoses, No cyanosis  Extremities: Normal range of motion, No clubbing, cyanosis or edema  Vascular: Peripheral pulses palpable 2+ bilaterally  Neurologic: Non-focal  Psychiatry: A & O x 3, Mood & affect appropriate      	    ECG:  	  RADIOLOGY:   DIAGNOSTIC TESTING:  [ ] Echocardiogram:  [ ]  Catheterization:  [ ] Stress Test:    [ ] SAFIA  OTHER: 	    LABS:	 	  CARDIAC MARKERS:                                  11.6   9.06  )-----------( 275      ( 03 Jan 2021 10:07 )             39.0     01-03    139  |  103  |  13  ----------------------------<  57<L>  4.3   |  26  |  0.60    Ca    9.0      03 Jan 2021 05:52  Mg     2.2     01-03    TPro  6.9  /  Alb  3.9  /  TBili  <0.2  /  DBili  x   /  AST  SEE NOTE  /  ALT  SEE NOTE  /  AlkPhos  SEE NOTE  01-02    proBNP:   Lipid Profile:   HgA1c:   TSH:       ASSESSMENT/PLAN: 	        DVT ppx:  Dispo:

## 2021-01-03 NOTE — CONSULT NOTE ADULT - ASSESSMENT
69yo F, never-smoker, with extensive pulmonary hx (followed by Dr. Eulalio Allison) including severe persistent asthma (on Dupixent, chronic steroids), TBM (s/p tracheoplasty), Colon/Anal Ca, AFib, ?Immunodeficiency,   Presented on 1/2 with several days of palpitations, dizziness, and chest pain,   Found to be AFib w/ RVR, since spontaneous resolution  Planned for further cardiac work-up    Pulmonary consulted for inpatient optimization of chronic pulmonary issues    #Asthma - Severe persistent 2/2 significant allergic component; on extensive regimen including chronic steroids and Dupixent. Patient is at her respiratory baseline and has no acute pulmonary complaints.   #Tracheobronchomalacia - s/p tracheoplasty in 2016 with subsequent bronchoscopies with good improvement both objectively and subjectively       - Would continue all current home pulmonary medications including: Solumedrol 4mg qd, DuoNebs q6 standing, Budesonide, Singulair, MucoMyst  - Start MucoMyst 10% inhalation (home med)   - Obtain Acapella flutter-valve device for use in-hospital (pt uses it at home; can ask Respiratory Therapy for it)   - Chest PT   - No indication for Abx or step-up in therapy at this time  - Encourage ambulation, incentive spirometry  - O2 via NC PRN for goal 93-97%  69yo F, never-smoker, with extensive pulmonary hx (followed by Dr. Eulalio Allison) including severe persistent asthma (on Dupixent, chronic steroids), TBM (s/p tracheoplasty), Colon/Anal Ca, AFib, ?Immunodeficiency,   Presented on 1/2 with several days of palpitations, dizziness, and chest pain,   Found to be AFib w/ RVR, since spontaneous resolution  Planned for further cardiac work-up    Pulmonary consulted for inpatient optimization of chronic pulmonary issues    #Asthma - Severe persistent 2/2 significant allergic component; on extensive regimen including chronic steroids and Dupixent. Patient is at her respiratory baseline and has no acute pulmonary complaints.   #Tracheobronchomalacia - s/p tracheoplasty in 2016 with subsequent bronchoscopies with good improvement both objectively and subjectively       - Would continue all current home pulmonary medications including: Solumedrol 4mg qd, DuoNebs q6 standing, Budesonide, Singulair, MucoMyst  - Start MucoMyst 10% inhalation (home med)   - Obtain Acapella flutter-valve device for use in-hospital (pt uses it at home; can ask Respiratory Therapy for it)   - Chest PT   - No indication for Abx or step-up in therapy at this time  - Encourage ambulation, incentive spirometry  - O2 via NC PRN for goal 93-97%       Pulmonary to Sign-Off. Call with questions

## 2021-01-03 NOTE — PROGRESS NOTE ADULT - PROBLEM SELECTOR PLAN 1
Episode of AF with RVR on site per EMS with self conversion to NSR prior to admission  -continue home Eliquis 5 mg BID and digoxin 0.25 mg QD  -CP free and trops neg x2   -f/u TSH  - ECHO 9/28/20: Normal right and left ventricular systolic function. EF 65%. Mild concentric LVH. Impaired relaxation of the LV. Mildly dilated LA. Mildly dilated ascending aorta: 3.8cm with aortic root: 3.9cm. Sclerotic AV with moderate to severe AI. MAC with mild MR. Mild TR.  No pericardial effusion.  -EP consulted for possible ablation

## 2021-01-03 NOTE — CONSULT NOTE ADULT - SUBJECTIVE AND OBJECTIVE BOX
Surgeon: Dr. John     Requesting Physician: Dr. Rodriguez     HISTORY OF PRESENT ILLNESS (Need 4):  Patient is a 70 year old female with PMHx of tracheobronchomalacia s/p tracheobronchoplasty, adrenal insufficiency on low dose prednisone, severe allergic asthma on dupilumuab (IL4 antagonist, MAB), bronchiectasis on inhaled tobramycin (started two weeks ago), lung nodules, squamous cell CA of anus s/p chemo/XRT in 2017, Moderate- Severe AR with normal LVEF and Mod LA dilation by 2D echo in August 2019, Paroxysmal atrial fibrillation on eliquis, T2DM, HTN      PAST MEDICAL & SURGICAL HISTORY:  TIA (transient ischemic attack)  multiple, last 5 years ago - presents as right-sided weakness    DVT (deep venous thrombosis)  15-20 years ago, took coumadin    Seizure  x 1 1/7/18    Rectal bleeding    Colorectal cancer  4/2018- last treatment , chemo and radiation    Tracheobronchomalacia  diagnosed 2015, s/p bronchial thermoplasty 2016 (Dr Zapien); recent bronchoscopy 6/5/2018 revealed no evidence of tracheobronchomalacia in trachea or bronchial tubes    Asthma    Pelvic fracture    Aortic insufficiency  moderate AR on echo 5/3/2018    Adrenal insufficiency  Medrol daily for over 50 years    COPD (chronic obstructive pulmonary disease)    Diabetes  Type 2    Atrial fibrillation  paroxysmal, on eliquis    Rectal bleeding  exam under anesthesia ([facility]) 2/2018    S/P bronchoscopy  6/5/2018 - [place] (Dr Zapien) no evidence of tracheobronchomalacia in trachea or bronchial tubes    History of tracheomalacia  2015 - attempted tracheal stenting ([facility])- course complicated by obstruction, respiratory failure, multiple CPR attempts -  stent discontinued; 10/20/2016 Tracheobronchoplasty (Prolene Mesh) performed at 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 1000 milliLiter(s) (50 mL/Hr) IV Continuous <Continuous>  dextrose 5%. 1000 milliLiter(s) (100 mL/Hr) IV Continuous <Continuous>  dextrose 50% Injectable 25 Gram(s) IV Push once  dextrose 50% Injectable 12.5 Gram(s) IV Push once  dextrose 50% Injectable 25 Gram(s) IV Push once  digoxin     Tablet 0.25 milliGRAM(s) Oral daily  glucagon  Injectable 1 milliGRAM(s) IntraMuscular once  insulin glargine Injectable (LANTUS) 30 Unit(s) SubCutaneous at bedtime  insulin lispro (ADMELOG) corrective regimen sliding scale   SubCutaneous Before meals and at bedtime  methylPREDNISolone 4 milliGRAM(s) Oral daily  montelukast 10 milliGRAM(s) Oral daily  pantoprazole    Tablet 40 milliGRAM(s) Oral before breakfast  polyethylene glycol 3350 17 Gram(s) Oral two times a day  tiotropium 18 MICROgram(s) Capsule 1 Capsule(s) Inhalation daily  tiotropium 18 MICROgram(s) Capsule 1 Capsule(s) Inhalation daily    MEDICATIONS  (PRN):      Allergies    ampicillin (Short breath)  aspirin (Short breath)  Avelox (Short breath; Pruritus)  codeine (Short breath)  Dilaudid (Short breath)  iodine (Short breath; Swelling)  penicillin (Short breath)  shellfish (Anaphylaxis)  tetanus toxoid (Short breath)  Valium (Short breath)    Intolerances        SOCIAL HISTORY:  Smoker:  YES / NO        PACK YEARS:                         WHEN QUIT?  ETOH use:  YES / NO               FREQUENCY / QUANTITY:  Ilicit Drug use:  YES / NO  Occupation:  Assisted device use (Cane / Walker):  Live with:    FAMILY HISTORY:  Family history of diabetes mellitus type II    Family history of breast cancer (Sibling)    Family history of asthma        Review of Systems (Need 10):  CONSTITUTIONAL: Denies fevers / chills, sweats, fatigue, weight loss, weight gain                                       NEURO:  Denies parathesias, seizures, syncope, confusion                                                                                  EYES:  Denies blurry vision, discharge, pain, loss of vision                                                                                    ENMT:  Denies difficulty hearing, vertigo, dysphagia, epistaxis, recent dental work                                       CV:  Denies chest pain, palpitations, KRAMER, orthopnea                                                                                           RESPIRATORY:  Denies wWheezing, SOB, cough / sputum, hemoptysis                                                               GI:  Denies nausea, vomiting, diarrhea, constipation, melena                                                                          : Denies hematuria, dysuria, urgency, incontinence                                                                                          MUSKULOSKELETAL:  Denies arthritis, joint swelling, muscle weakness                                                             SKIN/BREAST:  Denies rash, itching, hair loss, masses                                                                                              PSYCH:  Denies depression, anxiety, suicidal ideation                                                                                                HEME/LYMPH:  Denies bruises easily, enlarged lymph nodes, tender lymph nodes                                          ENDOCRINE:  Denies cold intolerance, heat intolerance, polydipsia                                                                      Vital Signs Last 24 Hrs  T(C): 36.3 (03 Jan 2021 09:07), Max: 36.8 (02 Jan 2021 14:49)  T(F): 97.3 (03 Jan 2021 09:07), Max: 98.3 (02 Jan 2021 14:49)  HR: 59 (03 Jan 2021 08:10) (59 - 92)  BP: 146/64 (03 Jan 2021 08:10) (130/58 - 171/72)  BP(mean): 108 (02 Jan 2021 19:22) (108 - 108)  RR: 18 (03 Jan 2021 08:10) (14 - 20)  SpO2: 98% (03 Jan 2021 08:10) (95% - 100%)    Physical Exam (Need 8)  CONSTITUTIONAL:                                                                          WNL  NEURO:                                                                                             WNL                      EYES:                                                                                                  WNL  ENMT:                                                                                                WNL  CV:                                                                                                      WNL  RESPIRATORY:                                                                                  WNL  GI:                                                                                                       WNL  : GERMAIN + / -                                                                                 WNL  MUSKULOSKELETAL:                                                                       WNL  SKIN / BREAST:                                                                                 WNL                                                          LABS:                        11.6   9.06  )-----------( 275      ( 03 Jan 2021 10:07 )             39.0     01-03    139  |  103  |  13  ----------------------------<  57<L>  4.3   |  26  |  0.60    Ca    9.0      03 Jan 2021 05:52  Mg     2.2     01-03    TPro  6.9  /  Alb  3.9  /  TBili  <0.2  /  DBili  x   /  AST  SEE NOTE  /  ALT  SEE NOTE  /  AlkPhos  SEE NOTE  01-02        CARDIAC MARKERS ( 03 Jan 2021 05:52 )  x     / <0.01 ng/mL / x     / x     / x      CARDIAC MARKERS ( 02 Jan 2021 16:38 )  x     / <0.01 ng/mL / x     / x     / x              RADIOLOGY & ADDITIONAL STUDIES:  CAROTID U/S:    CXR:    CT Scan:    EKG:    TTE / SAFIA:    Cardiac Cath: Surgeon: Dr. John     Requesting Physician: Dr. Rodriguez     HISTORY OF PRESENT ILLNESS (Need 4):  Patient is a 70 year old female with PMHx of tracheobronchomalacia s/p tracheobronchoplasty, adrenal insufficiency on low dose prednisone, severe allergic asthma on dupilumuab (IL4 antagonist, MAB), bronchiectasis on inhaled tobramycin (started two weeks ago), lung nodules, squamous cell CA of anus s/p chemo/XRT in 2017, Moderate- Severe AR with normal LVEF and Mod LA dilation by 2D echo in August 2019, Paroxysmal atrial fibrillation on eliquis, T2DM, HTN BIBEMS for hypertension, palpitations, SOB and dizziness. Denies CP, diaphoresis, LE edema,       PAST MEDICAL & SURGICAL HISTORY:  TIA (transient ischemic attack)  multiple, last 5 years ago - presents as right-sided weakness    DVT (deep venous thrombosis)  15-20 years ago, took coumadin    Seizure  x 1 1/7/18    Rectal bleeding    Colorectal cancer  4/2018- last treatment , chemo and radiation    Tracheobronchomalacia  diagnosed 2015, s/p bronchial thermoplasty 2016 (Dr Zapien); recent bronchoscopy 6/5/2018 revealed no evidence of tracheobronchomalacia in trachea or bronchial tubes    Asthma    Pelvic fracture    Aortic insufficiency  moderate AR on echo 5/3/2018    Adrenal insufficiency  Medrol daily for over 50 years    COPD (chronic obstructive pulmonary disease)    Diabetes  Type 2    Atrial fibrillation  paroxysmal, on eliquis    Rectal bleeding  exam under anesthesia ([facility]) 2/2018    S/P bronchoscopy  6/5/2018 - [place] (Dr Zapien) no evidence of tracheobronchomalacia in trachea or bronchial tubes    History of tracheomalacia  2015 - attempted tracheal stenting ([facility])- course complicated by obstruction, respiratory failure, multiple CPR attempts -  stent discontinued; 10/20/2016 Tracheobronchoplasty (Prolene Mesh) performed at [facility] by Dr Zapien    H/O pelvic surgery  5 years ago - s/p fracture    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 1000 milliLiter(s) (50 mL/Hr) IV Continuous <Continuous>  dextrose 5%. 1000 milliLiter(s) (100 mL/Hr) IV Continuous <Continuous>  dextrose 50% Injectable 25 Gram(s) IV Push once  dextrose 50% Injectable 12.5 Gram(s) IV Push once  dextrose 50% Injectable 25 Gram(s) IV Push once  digoxin     Tablet 0.25 milliGRAM(s) Oral daily  glucagon  Injectable 1 milliGRAM(s) IntraMuscular once  insulin glargine Injectable (LANTUS) 30 Unit(s) SubCutaneous at bedtime  insulin lispro (ADMELOG) corrective regimen sliding scale   SubCutaneous Before meals and at bedtime  methylPREDNISolone 4 milliGRAM(s) Oral daily  montelukast 10 milliGRAM(s) Oral daily  pantoprazole    Tablet 40 milliGRAM(s) Oral before breakfast  polyethylene glycol 3350 17 Gram(s) Oral two times a day  tiotropium 18 MICROgram(s) Capsule 1 Capsule(s) Inhalation daily  tiotropium 18 MICROgram(s) Capsule 1 Capsule(s) Inhalation daily    MEDICATIONS  (PRN):      Allergies    ampicillin (Short breath)  aspirin (Short breath)  Avelox (Short breath; Pruritus)  codeine (Short breath)  Dilaudid (Short breath)  iodine (Short breath; Swelling)  penicillin (Short breath)  shellfish (Anaphylaxis)  tetanus toxoid (Short breath)  Valium (Short breath)    Intolerances        SOCIAL HISTORY:  Smoker:  YES / NO        PACK YEARS:                         WHEN QUIT?  ETOH use:  YES / NO               FREQUENCY / QUANTITY:  Ilicit Drug use:  YES / NO  Occupation:  Assisted device use (Cane / Walker):  Live with:    FAMILY HISTORY:  Family history of diabetes mellitus type II    Family history of breast cancer (Sibling)    Family history of asthma        Review of Systems (Need 10):  CONSTITUTIONAL: Denies fevers / chills, sweats, fatigue, weight loss, weight gain                                       NEURO:  Denies parathesias, seizures, syncope, confusion                                                                                  EYES:  Denies blurry vision, discharge, pain, loss of vision                                                                                    ENMT:  Denies difficulty hearing, vertigo, dysphagia, epistaxis, recent dental work                                       CV:  Denies chest pain, palpitations, KRAMER, orthopnea                                                                                           RESPIRATORY:  Denies wWheezing, SOB, cough / sputum, hemoptysis                                                               GI:  Denies nausea, vomiting, diarrhea, constipation, melena                                                                          : Denies hematuria, dysuria, urgency, incontinence                                                                                          MUSKULOSKELETAL:  Denies arthritis, joint swelling, muscle weakness                                                             SKIN/BREAST:  Denies rash, itching, hair loss, masses                                                                                              PSYCH:  Denies depression, anxiety, suicidal ideation                                                                                                HEME/LYMPH:  Denies bruises easily, enlarged lymph nodes, tender lymph nodes                                          ENDOCRINE:  Denies cold intolerance, heat intolerance, polydipsia                                                                      Vital Signs Last 24 Hrs  T(C): 36.3 (03 Jan 2021 09:07), Max: 36.8 (02 Jan 2021 14:49)  T(F): 97.3 (03 Jan 2021 09:07), Max: 98.3 (02 Jan 2021 14:49)  HR: 59 (03 Jan 2021 08:10) (59 - 92)  BP: 146/64 (03 Jan 2021 08:10) (130/58 - 171/72)  BP(mean): 108 (02 Jan 2021 19:22) (108 - 108)  RR: 18 (03 Jan 2021 08:10) (14 - 20)  SpO2: 98% (03 Jan 2021 08:10) (95% - 100%)    Physical Exam (Need 8)  CONSTITUTIONAL:                                                                          WNL  NEURO:                                                                                             WNL                      EYES:                                                                                                  WNL  ENMT:                                                                                                WNL  CV:                                                                                                      WNL  RESPIRATORY:                                                                                  WNL  GI:                                                                                                       WNL  : GERMAIN + / -                                                                                 WNL  MUSKULOSKELETAL:                                                                       WNL  SKIN / BREAST:                                                                                 WNL                                                          LABS:                        11.6   9.06  )-----------( 275      ( 03 Jan 2021 10:07 )             39.0     01-03    139  |  103  |  13  ----------------------------<  57<L>  4.3   |  26  |  0.60    Ca    9.0      03 Jan 2021 05:52  Mg     2.2     01-03    TPro  6.9  /  Alb  3.9  /  TBili  <0.2  /  DBili  x   /  AST  SEE NOTE  /  ALT  SEE NOTE  /  AlkPhos  SEE NOTE  01-02        CARDIAC MARKERS ( 03 Jan 2021 05:52 )  x     / <0.01 ng/mL / x     / x     / x      CARDIAC MARKERS ( 02 Jan 2021 16:38 )  x     / <0.01 ng/mL / x     / x     / x              RADIOLOGY & ADDITIONAL STUDIES:  CAROTID U/S:    CXR:    CT Scan:    EKG:    TTE / SAFIA:    Cardiac Cath: Surgeon: Dr. John     Requesting Physician: Dr. Rodriguez     HISTORY OF PRESENT ILLNESS (Need 4):  Patient is a 70 year old female with PMHx of tracheobronchomalacia s/p tracheobronchoplasty, adrenal insufficiency on low dose prednisone, severe allergic asthma on dupilumuab (IL4 antagonist, MAB), bronchiectasis on inhaled tobramycin (started two weeks ago), lung nodules, squamous cell CA of anus s/p chemo/XRT in 2017, Moderate- Severe AR with normal LVEF and Mod LA dilation by 2D echo in August 2019, Paroxysmal atrial fibrillation on eliquis, T2DM, HTN BIBEMS for hypertension, palpitations, SOB and dizziness. Denies CP, diaphoresis, LE edema, orthopnea. Tele stip per pt showed rapid Afib. Upon arrival to the ED pt afebrile, HR 92 NSR, /90, SpO2 100% on RA. Shew as admitted for cardiology for further workup and possible ablation. She was recently seen by Dr. John for outpatient evaluation of moderate/severe AI, structural heart consulted now that she is inpatient.       PAST MEDICAL & SURGICAL HISTORY:  TIA (transient ischemic attack)  multiple, last 5 years ago - presents as right-sided weakness    DVT (deep venous thrombosis)  15-20 years ago, took coumadin    Seizure  x 1 1/7/18    Rectal bleeding    Colorectal cancer  4/2018- last treatment , chemo and radiation    Tracheobronchomalacia  diagnosed 2015, s/p bronchial thermoplasty 2016 (Dr Zapien); recent bronchoscopy 6/5/2018 revealed no evidence of tracheobronchomalacia in trachea or bronchial tubes    Asthma    Pelvic fracture    Aortic insufficiency  moderate AR on echo 5/3/2018    Adrenal insufficiency  Medrol daily for over 50 years    COPD (chronic obstructive pulmonary disease)    Diabetes  Type 2    Atrial fibrillation  paroxysmal, on eliquis    Rectal bleeding  exam under anesthesia ([facility]) 2/2018    S/P bronchoscopy  6/5/2018 - [place] (Dr Zapien) no evidence of tracheobronchomalacia in trachea or bronchial tubes    History of tracheomalacia  2015 - attempted tracheal [facility]ing ([facility])- course complicated by obstruction, respiratory failure, multiple CPR attempts -  stent discontinued; 10/20/2016 Tracheobronchoplasty (Prolene Mesh) performed at 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 1000 milliLiter(s) (50 mL/Hr) IV Continuous <Continuous>  dextrose 5%. 1000 milliLiter(s) (100 mL/Hr) IV Continuous <Continuous>  dextrose 50% Injectable 25 Gram(s) IV Push once  dextrose 50% Injectable 12.5 Gram(s) IV Push once  dextrose 50% Injectable 25 Gram(s) IV Push once  digoxin     Tablet 0.25 milliGRAM(s) Oral daily  glucagon  Injectable 1 milliGRAM(s) IntraMuscular once  insulin glargine Injectable (LANTUS) 30 Unit(s) SubCutaneous at bedtime  insulin lispro (ADMELOG) corrective regimen sliding scale   SubCutaneous Before meals and at bedtime  methylPREDNISolone 4 milliGRAM(s) Oral daily  montelukast 10 milliGRAM(s) Oral daily  pantoprazole    Tablet 40 milliGRAM(s) Oral before breakfast  polyethylene glycol 3350 17 Gram(s) Oral two times a day  tiotropium 18 MICROgram(s) Capsule 1 Capsule(s) Inhalation daily  tiotropium 18 MICROgram(s) Capsule 1 Capsule(s) Inhalation daily    MEDICATIONS  (PRN):      Allergies    ampicillin (Short breath)  aspirin (Short breath)  Avelox (Short breath; Pruritus)  codeine (Short breath)  Dilaudid (Short breath)  iodine (Short breath; Swelling)  penicillin (Short breath)  shellfish (Anaphylaxis)  tetanus toxoid (Short breath)  Valium (Short breath)    Intolerances        SOCIAL HISTORY:  Smoker: never  ETOH use: denies  Ilicit Drug use:  no    FAMILY HISTORY:  Family history of diabetes mellitus type II    Family history of breast cancer (Sibling)    Family history of asthma        Review of Systems (Need 10):  CONSTITUTIONAL: Denies fevers / chills, sweats, fatigue, weight loss, weight gain                                       NEURO:  Denies parathesias, seizures, syncope, confusion                                                                                  EYES:  Denies blurry vision, discharge, pain, loss of vision                                                                                    ENMT:  Denies difficulty hearing, vertigo, dysphagia, epistaxis, recent dental work                                       CV:  Denies chest pain, palpitations, KRAMER, orthopnea                                                                                           RESPIRATORY:  Denies wWheezing, cough / sputum, hemoptysis                                                               GI:  Denies nausea, vomiting, diarrhea, constipation, melena                                                                          : Denies hematuria, dysuria, urgency, incontinence                                                                                          MUSKULOSKELETAL:  Denies arthritis, joint swelling, muscle weakness                                                             SKIN/BREAST:  Denies rash, itching, hair loss, masses                                                                                              PSYCH:  Denies depression, anxiety, suicidal ideation                                                                                                HEME/LYMPH:  Denies bruises easily, enlarged lymph nodes, tender lymph nodes                                          ENDOCRINE:  Denies cold intolerance, heat intolerance, polydipsia                                                                      Vital Signs Last 24 Hrs  T(C): 36.3 (03 Jan 2021 09:07), Max: 36.8 (02 Jan 2021 14:49)  T(F): 97.3 (03 Jan 2021 09:07), Max: 98.3 (02 Jan 2021 14:49)  HR: 59 (03 Jan 2021 08:10) (59 - 92)  BP: 146/64 (03 Jan 2021 08:10) (130/58 - 171/72)  BP(mean): 108 (02 Jan 2021 19:22) (108 - 108)  RR: 18 (03 Jan 2021 08:10) (14 - 20)  SpO2: 98% (03 Jan 2021 08:10) (95% - 100%)    Physical Exam (Need 8)  General: Lying comfortably in bed, NAD  Head: NCAT  Eyes: EOMI, sclera anicteric   Neurological: A&O x3, no focal deficits,  Cardiovascular: RRR, normal s1 s2, no M/R/G  Respiratory:  CTA b/l, no W/R/R, clubbing of fingers   Gastrointestinal: soft, non-tender to palpation, non-distended  Extremities: WWP, no pitting edema, no calf tenderness                                                            LABS:                        11.6   9.06  )-----------( 275      ( 03 Jan 2021 10:07 )             39.0     01-03    139  |  103  |  13  ----------------------------<  57<L>  4.3   |  26  |  0.60    Ca    9.0      03 Jan 2021 05:52  Mg     2.2     01-03    TPro  6.9  /  Alb  3.9  /  TBili  <0.2  /  DBili  x   /  AST  SEE NOTE  /  ALT  SEE NOTE  /  AlkPhos  SEE NOTE  01-02        CARDIAC MARKERS ( 03 Jan 2021 05:52 )  x     / <0.01 ng/mL / x     / x     / x      CARDIAC MARKERS ( 02 Jan 2021 16:38 )  x     / <0.01 ng/mL / x     / x     / x              RADIOLOGY & ADDITIONAL STUDIES:  CAROTID U/S:    CXR:    CT Scan:    EKG:    TTE / SAFIA:    Cardiac Cath:

## 2021-01-03 NOTE — CONSULT NOTE ADULT - ASSESSMENT
Patient is a 70 year old female with PMHx of tracheobronchomalacia s/p tracheobronchoplasty, adrenal insufficiency on low dose prednisone, severe allergic asthma on dupilumuab (IL4 antagonist, MAB), bronchiectasis on inhaled tobramycin (started two weeks ago), lung nodules, squamous cell CA of anus s/p chemo/XRT in 2017, Moderate- Severe AR with normal LVEF and Mod LA dilation by 2D echo in August 2019, Paroxysmal atrial fibrillation on eliquis, T2DM, HTN BIBEMS for hypertension, palpitations, SOB and dizziness. Tele stip per pt showed rapid Afib with spontaneous conversion to NSR on arrival to ED.  Shew as admitted for cardiology for further workup and possible ablation. She was recently seen by Dr. John for outpatient evaluation of moderate/severe AI, structural heart consulted now that she is inpatient.     Plan:  Problem 1: AI  - Most recent TTE with moderate/severe AI  - Cardiac MRI to obtain volumetric degree of AI       Problem 2:      Problem 3:      Problem 4:    I have reviewed clinical labs tests and reports, radiology tests and reports, as well as old patient medical records, and discussed with the refering physician.     Patient is a 70 year old female with PMHx of tracheobronchomalacia s/p tracheobronchoplasty, adrenal insufficiency on low dose prednisone, severe allergic asthma on dupilumuab (IL4 antagonist, MAB), bronchiectasis on inhaled tobramycin (started two weeks ago), lung nodules, squamous cell CA of anus s/p chemo/XRT in 2017, Moderate- Severe AR with normal LVEF and Mod LA dilation by 2D echo in August 2019, Paroxysmal atrial fibrillation on eliquis, T2DM, HTN BIBEMS for hypertension, palpitations, SOB and dizziness. Tele stip per pt showed rapid Afib with spontaneous conversion to NSR on arrival to ED.  Shew as admitted for cardiology for further workup and possible ablation. She was recently seen by Dr. John for outpatient evaluation of moderate/severe AI, structural heart consulted now that she is inpatient.     Plan:  Problem 1: AI  - Most recent TTE 9/28/20: Normal right and left ventricular systolic function. EF 65%. Mild concentric LVH. Impaired relaxation of the LV. Mildly dilated LA. Mildly dilated ascending aorta: 3.8cm with aortic root: 3.9cm. Sclerotic AV with moderate to severe AI. MAC with mild MR. Mild TR.  No pericardial effusion.  - Repeat TTE, cardiac MRI to assess AI volume, stress echo     Problem 2: Afib   - Continue AC with eliquis, continue digoxin  - EP consulted for possible ablation this admission    Problem 3: Tracheobronchomalacia  -  Tracheobronchomalacia s/p Tracheobronchopasty   - Most recent bronchoscopy 10/2 showed no residual TBM   - Continue spiriva, pulmicort, monteleukast, albuterol    Problem 4:DM  - A1c 7.4 on lantus 30  - Care per primary team     I have reviewed clinical labs tests and reports, radiology tests and reports, as well as old patient medical records, and discussed with the refering physician.

## 2021-01-03 NOTE — CONSULT NOTE ADULT - SUBJECTIVE AND OBJECTIVE BOX
PULMONARY SERVICE INITIAL CONSULT NOTE  -------------------------------------------------------------    HPI:     69yo F, never-smoker, with extensive pulmonary hx (followed by Dr. Eulalio Allison) including severe persistent asthma (on Dupixent, chronic steroids), TBM (s/p tracheoplasty), Colon/Anal Ca, AFib, ?Immunodeficiency,   Presented on 1/2 with several days of palpitations, dizziness, and chest pain,   Found to be AFib w/ RVR, since spontaneous resolution  Planned for further cardiac work-up    Pulmonary consulted for inpatient optimization of chronic pulmonary issues    --    Pt seen and examined this PM.  Reports feeling much better. No acute complaints. Denies recent worsening of respiratory/pulmonary issues. Cites that her tracheoplasty procedure greatly improved her symptoms. Compliant with all home inhalers.   Appears very well and has no acute complaints.   Denies fevers/chills, HA, dizziness, CP, SOB, new cough, abd pain, N/V, bowel changes, ext swelling     Pt is HD-stable, afebrile and saturating well on RA.   No leukocytosis  CXR with no acute pathology.     --    Pt is long-term patient of Dr. Eulalio Allison/pulmonary and is on extensive pulmonary regimen including chronic Solumedrol 4mg qd.         -------------------------------------------------------------  PAST MEDICAL & SURGICAL HISTORY:  TIA (transient ischemic attack)  multiple, last 5 years ago - presents as right-sided weakness    DVT (deep venous thrombosis)  15-20 years ago, took coumadin    Seizure  x 1 1/7/18    Rectal bleeding    Colorectal cancer  4/2018- last treatment , chemo and radiation    Tracheobronchomalacia  diagnosed 2015, s/p bronchial thermoplasty 2016 (Dr Zapien); recent bronchoscopy 6/5/2018 revealed no evidence of tracheobronchomalacia in trachea or bronchial tubes    Asthma    Pelvic fracture    Aortic insufficiency  moderate AR on echo 5/3/2018    Adrenal insufficiency  Medrol daily for over 50 years    COPD (chronic obstructive pulmonary disease)    Diabetes  Type 2    Atrial fibrillation  paroxysmal, on eliquis    Rectal bleeding  exam under anesthesia ([facility]) 2/2018    S/P bronchoscopy  6/5/2018 - [place] (Dr Zapien) no evidence of tracheobronchomalacia in trachea or bronchial tubes    History of tracheomalacia  2015 - attempted tracheal stenting ([facility])- course complicated by obstruction, respiratory failure, multiple CPR attempts -  stent discontinued; 10/20/2016 Tracheobronchoplasty (Prolene Mesh) performed at [facility] by Dr Zapien    H/O pelvic surgery  5 years ago - s/p fracture    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    History of sinus surgery  multiple sinus surgeries    H/O total knee replacement, bilateral  5 years ago    History of partial hysterectomy  30 years ago - fibroids        FAMILY HISTORY:  Family history of diabetes mellitus type II    Family history of breast cancer (Sibling)    Family history of asthma        SOCIAL HISTORY:  Smoking Status: [ ] Current, [ ] Former, [X ] Never  Pack Years:    MEDICATIONS:  Pulmonary:  ALBUTerol    90 MICROgram(s) HFA Inhaler 1 Puff(s) Inhalation every 4 hours  albuterol/ipratropium for Nebulization 3 milliLiter(s) Nebulizer every 6 hours  buDESOnide    Inhalation Suspension 0.5 milliGRAM(s) Inhalation two times a day  montelukast 10 milliGRAM(s) Oral daily  tiotropium 18 MICROgram(s) Capsule 1 Capsule(s) Inhalation daily  tiotropium 18 MICROgram(s) Capsule 1 Capsule(s) Inhalation daily    Antimicrobials:    Anticoagulants:  apixaban 5 milliGRAM(s) Oral every 12 hours    Onc:    GI/:  pantoprazole    Tablet 40 milliGRAM(s) Oral before breakfast  polyethylene glycol 3350 17 Gram(s) Oral two times a day    Endocrine:  atorvastatin 20 milliGRAM(s) Oral at bedtime  dextrose 40% Gel 15 Gram(s) Oral once  dextrose 50% Injectable 25 Gram(s) IV Push once  dextrose 50% Injectable 12.5 Gram(s) IV Push once  dextrose 50% Injectable 25 Gram(s) IV Push once  glucagon  Injectable 1 milliGRAM(s) IntraMuscular once  insulin glargine Injectable (LANTUS) 30 Unit(s) SubCutaneous at bedtime  insulin lispro (ADMELOG) corrective regimen sliding scale   SubCutaneous Before meals and at bedtime  methylPREDNISolone 4 milliGRAM(s) Oral daily    Cardiac:  digoxin     Tablet 0.25 milliGRAM(s) Oral daily    Other Medications:  dextrose 5%. 1000 milliLiter(s) IV Continuous <Continuous>  dextrose 5%. 1000 milliLiter(s) IV Continuous <Continuous>      Allergies    ampicillin (Short breath)  aspirin (Short breath)  Avelox (Short breath; Pruritus)  codeine (Short breath)  Dilaudid (Short breath)  iodine (Short breath; Swelling)  penicillin (Short breath)  shellfish (Anaphylaxis)  tetanus toxoid (Short breath)  Valium (Short breath)    Intolerances        Vital Signs Last 24 Hrs  T(C): 36.1 (03 Jan 2021 13:10), Max: 36.8 (02 Jan 2021 15:52)  T(F): 96.9 (03 Jan 2021 13:10), Max: 98.3 (02 Jan 2021 15:52)  HR: 75 (03 Jan 2021 13:24) (59 - 82)  BP: 156/65 (03 Jan 2021 13:24) (130/58 - 171/72)  BP(mean): 108 (02 Jan 2021 19:22) (108 - 108)  RR: 18 (03 Jan 2021 13:24) (14 - 20)  SpO2: 98% (03 Jan 2021 13:24) (95% - 98%)    01-02 @ 07:01 - 01-03 @ 07:00  --------------------------------------------------------  IN: 420 mL / OUT: 0 mL / NET: 420 mL    01-03 @ 07:01  - 01-03 @ 15:49  --------------------------------------------------------  IN: 300 mL / OUT: 0 mL / NET: 300 mL          -------------------------------------------------------------  PHYSICAL EXAM:    GEN: Comfortable, in NAD  HEENT: NC/AT, PEERLA, MMM  CARDIAC: RRR, Normal S1/S2, No MRGs  PULMONARY: Mild exp wheezing BL, no rhonchi/rales - no accessory muscle use   ABDOMEN: Soft, NT/ND   EXT: No LE edema  NEURO: A&O x 3, CN II-XII grossly intact, moves all ext, sensation intact    -------------------------------------------------------------  LABS:        CBC Full  -  ( 03 Jan 2021 10:07 )  WBC Count : 9.06 K/uL  RBC Count : 5.12 M/uL  Hemoglobin : 11.6 g/dL  Hematocrit : 39.0 %  Platelet Count - Automated : 275 K/uL  Mean Cell Volume : 76.2 fl  Mean Cell Hemoglobin : 22.7 pg  Mean Cell Hemoglobin Concentration : 29.7 gm/dL  Auto Neutrophil # : x  Auto Lymphocyte # : x  Auto Monocyte # : x  Auto Eosinophil # : x  Auto Basophil # : x  Auto Neutrophil % : x  Auto Lymphocyte % : x  Auto Monocyte % : x  Auto Eosinophil % : x  Auto Basophil % : x    01-03    139  |  103  |  13  ----------------------------<  57<L>  4.3   |  26  |  0.60    Ca    9.0      03 Jan 2021 05:52  Mg     2.2     01-03    TPro  6.9  /  Alb  3.9  /  TBili  <0.2  /  DBili  x   /  AST  SEE NOTE  /  ALT  SEE NOTE  /  AlkPhos  SEE NOTE  01-02                      -------------------------------------------------------------  RADIOLOGY & ADDITIONAL STUDIES:

## 2021-01-03 NOTE — PROGRESS NOTE ADULT - PROBLEM SELECTOR PLAN 6
-home steroids managed by Pulmonologist    DVT F: Oral intake  E: Replete electrolytes as needed for K<4 and Mg<2  N: DASH Diet    DVT PPX: Eliquis 5 mg BID  Dispo: pending further structural and EP evaluation, NPO after MN     Case discussed w/ Dr Rodriguez and Dr Pearson

## 2021-01-04 ENCOUNTER — TRANSCRIPTION ENCOUNTER (OUTPATIENT)
Age: 71
End: 2021-01-04

## 2021-01-04 LAB
ANION GAP SERPL CALC-SCNC: 8 MMOL/L — SIGNIFICANT CHANGE UP (ref 5–17)
BUN SERPL-MCNC: 16 MG/DL — SIGNIFICANT CHANGE UP (ref 7–23)
CALCIUM SERPL-MCNC: 8.8 MG/DL — SIGNIFICANT CHANGE UP (ref 8.4–10.5)
CHLORIDE SERPL-SCNC: 102 MMOL/L — SIGNIFICANT CHANGE UP (ref 96–108)
CHOLEST SERPL-MCNC: 150 MG/DL — SIGNIFICANT CHANGE UP
CO2 SERPL-SCNC: 31 MMOL/L — SIGNIFICANT CHANGE UP (ref 22–31)
CREAT SERPL-MCNC: 0.7 MG/DL — SIGNIFICANT CHANGE UP (ref 0.5–1.3)
GLUCOSE BLDC GLUCOMTR-MCNC: 142 MG/DL — HIGH (ref 70–99)
GLUCOSE BLDC GLUCOMTR-MCNC: 151 MG/DL — HIGH (ref 70–99)
GLUCOSE BLDC GLUCOMTR-MCNC: 196 MG/DL — HIGH (ref 70–99)
GLUCOSE BLDC GLUCOMTR-MCNC: 209 MG/DL — HIGH (ref 70–99)
GLUCOSE BLDC GLUCOMTR-MCNC: 67 MG/DL — LOW (ref 70–99)
GLUCOSE SERPL-MCNC: 62 MG/DL — LOW (ref 70–99)
HCT VFR BLD CALC: 39.3 % — SIGNIFICANT CHANGE UP (ref 34.5–45)
HDLC SERPL-MCNC: 62 MG/DL — SIGNIFICANT CHANGE UP
HGB BLD-MCNC: 11.6 G/DL — SIGNIFICANT CHANGE UP (ref 11.5–15.5)
LIPID PNL WITH DIRECT LDL SERPL: 76 MG/DL — SIGNIFICANT CHANGE UP
MAGNESIUM SERPL-MCNC: 2 MG/DL — SIGNIFICANT CHANGE UP (ref 1.6–2.6)
MCHC RBC-ENTMCNC: 22.5 PG — LOW (ref 27–34)
MCHC RBC-ENTMCNC: 29.5 GM/DL — LOW (ref 32–36)
MCV RBC AUTO: 76.3 FL — LOW (ref 80–100)
NON HDL CHOLESTEROL: 88 MG/DL — SIGNIFICANT CHANGE UP
NRBC # BLD: 0 /100 WBCS — SIGNIFICANT CHANGE UP (ref 0–0)
PLATELET # BLD AUTO: 276 K/UL — SIGNIFICANT CHANGE UP (ref 150–400)
POTASSIUM SERPL-MCNC: 4 MMOL/L — SIGNIFICANT CHANGE UP (ref 3.5–5.3)
POTASSIUM SERPL-SCNC: 4 MMOL/L — SIGNIFICANT CHANGE UP (ref 3.5–5.3)
RBC # BLD: 5.15 M/UL — SIGNIFICANT CHANGE UP (ref 3.8–5.2)
RBC # FLD: 16 % — HIGH (ref 10.3–14.5)
SODIUM SERPL-SCNC: 141 MMOL/L — SIGNIFICANT CHANGE UP (ref 135–145)
TRIGL SERPL-MCNC: 59 MG/DL — SIGNIFICANT CHANGE UP
WBC # BLD: 7.25 K/UL — SIGNIFICANT CHANGE UP (ref 3.8–10.5)
WBC # FLD AUTO: 7.25 K/UL — SIGNIFICANT CHANGE UP (ref 3.8–10.5)

## 2021-01-04 PROCEDURE — 99231 SBSQ HOSP IP/OBS SF/LOW 25: CPT

## 2021-01-04 PROCEDURE — 93306 TTE W/DOPPLER COMPLETE: CPT | Mod: 26

## 2021-01-04 PROCEDURE — 99223 1ST HOSP IP/OBS HIGH 75: CPT

## 2021-01-04 PROCEDURE — 99233 SBSQ HOSP IP/OBS HIGH 50: CPT

## 2021-01-04 PROCEDURE — 75557 CARDIAC MRI FOR MORPH: CPT | Mod: 26

## 2021-01-04 RX ORDER — DEXTROSE 50 % IN WATER 50 %
25 SYRINGE (ML) INTRAVENOUS ONCE
Refills: 0 | Status: DISCONTINUED | OUTPATIENT
Start: 2021-01-04 | End: 2021-01-04

## 2021-01-04 RX ORDER — DEXTROSE 50 % IN WATER 50 %
12.5 SYRINGE (ML) INTRAVENOUS ONCE
Refills: 0 | Status: COMPLETED | OUTPATIENT
Start: 2021-01-04 | End: 2021-01-04

## 2021-01-04 RX ORDER — ACETYLCYSTEINE 200 MG/ML
6 VIAL (ML) MISCELLANEOUS
Qty: 0 | Refills: 0 | DISCHARGE
Start: 2021-01-04

## 2021-01-04 RX ORDER — BENZOCAINE AND MENTHOL 5; 1 G/100ML; G/100ML
1 LIQUID ORAL EVERY 6 HOURS
Refills: 0 | Status: DISCONTINUED | OUTPATIENT
Start: 2021-01-04 | End: 2021-01-05

## 2021-01-04 RX ADMIN — APIXABAN 5 MILLIGRAM(S): 2.5 TABLET, FILM COATED ORAL at 23:20

## 2021-01-04 RX ADMIN — MONTELUKAST 10 MILLIGRAM(S): 4 TABLET, CHEWABLE ORAL at 11:39

## 2021-01-04 RX ADMIN — Medication 3 MILLILITER(S): at 10:11

## 2021-01-04 RX ADMIN — Medication 6 MILLILITER(S): at 15:05

## 2021-01-04 RX ADMIN — PANTOPRAZOLE SODIUM 40 MILLIGRAM(S): 20 TABLET, DELAYED RELEASE ORAL at 05:42

## 2021-01-04 RX ADMIN — POLYETHYLENE GLYCOL 3350 17 GRAM(S): 17 POWDER, FOR SOLUTION ORAL at 23:20

## 2021-01-04 RX ADMIN — BENZOCAINE AND MENTHOL 1 LOZENGE: 5; 1 LIQUID ORAL at 23:19

## 2021-01-04 RX ADMIN — Medication 0.5 MILLIGRAM(S): at 17:27

## 2021-01-04 RX ADMIN — Medication 2: at 16:19

## 2021-01-04 RX ADMIN — Medication 3 MILLILITER(S): at 16:12

## 2021-01-04 RX ADMIN — Medication 6 MILLILITER(S): at 06:46

## 2021-01-04 RX ADMIN — Medication 4 MILLIGRAM(S): at 06:46

## 2021-01-04 RX ADMIN — Medication 0.25 MILLIGRAM(S): at 05:43

## 2021-01-04 RX ADMIN — Medication 3 MILLILITER(S): at 23:20

## 2021-01-04 RX ADMIN — Medication 1: at 11:58

## 2021-01-04 RX ADMIN — TIOTROPIUM BROMIDE 1 CAPSULE(S): 18 CAPSULE ORAL; RESPIRATORY (INHALATION) at 10:08

## 2021-01-04 RX ADMIN — APIXABAN 5 MILLIGRAM(S): 2.5 TABLET, FILM COATED ORAL at 05:42

## 2021-01-04 RX ADMIN — Medication 0.5 MILLIGRAM(S): at 07:35

## 2021-01-04 RX ADMIN — POLYETHYLENE GLYCOL 3350 17 GRAM(S): 17 POWDER, FOR SOLUTION ORAL at 11:39

## 2021-01-04 RX ADMIN — Medication 3 MILLILITER(S): at 05:43

## 2021-01-04 RX ADMIN — Medication 1: at 23:06

## 2021-01-04 RX ADMIN — BENZOCAINE AND MENTHOL 1 LOZENGE: 5; 1 LIQUID ORAL at 16:38

## 2021-01-04 RX ADMIN — INSULIN GLARGINE 30 UNIT(S): 100 INJECTION, SOLUTION SUBCUTANEOUS at 23:07

## 2021-01-04 RX ADMIN — Medication 12.5 GRAM(S): at 06:46

## 2021-01-04 RX ADMIN — ATORVASTATIN CALCIUM 20 MILLIGRAM(S): 80 TABLET, FILM COATED ORAL at 23:20

## 2021-01-04 NOTE — PROGRESS NOTE ADULT - PROBLEM SELECTOR PLAN 1
Episode of AF with RVR on site per EMS with self conversion to NSR prior to admission  -continue home Eliquis 5 mg BID and digoxin 0.25 mg QD  -CP free and trops neg x2   -f/u TSH  - ECHO 9/28/20: Normal right and left ventricular systolic function. EF 65%. Mild concentric LVH. Impaired relaxation of the LV. Mildly dilated LA. Mildly dilated ascending aorta: 3.8cm with aortic root: 3.9cm. Sclerotic AV with moderate to severe AI. MAC with mild MR. Mild TR.  No pericardial effusion.  -EP consulted for possible ablation Episode of AF with RVR on site per EMS with self conversion to NSR prior to admission  -continue home Eliquis 5 mg BID and digoxin 0.25 mg QD  -CP free and trops neg x2   - TSH wnl, digoxin level wnl   - ECHO 9/28/20: Normal right and left ventricular systolic function. EF 65%. Mild concentric LVH. Impaired relaxation of the LV. Mildly dilated LA. Mildly dilated ascending aorta: 3.8cm with aortic root: 3.9cm. Sclerotic AV with moderate to severe AI. MAC with mild MR. Mild TR.  No pericardial effusion.  -EP consulted and no plan for ablation 2/2 pulm comorbidities and would require intubation.

## 2021-01-04 NOTE — PROGRESS NOTE ADULT - ATTENDING COMMENTS
Initial attending contact date 1/3 on morning bedside rounds. See attending addendum to HPI for initial attending contact documentation.  See PA note written above, for details. I reviewed the PA documentation.  I have personally seen and examined this patient today. I reviewed vitals, labs, medications, cardiac studies and additional imaging.  I agree with the PA's findings and plans as written above with the following additions/amendments:  Patient with pAfib, now in NSR rate controlled. No chest pain. Troponin neg serial.  +dyspnea with active wheezing and cough on exam  Plan for:  Nebulizer therapy  Pulm consult given active bronchospasm  Obtain TTE for cardiac structural assessment  CTSx structural team consult (pt known to Alvaro) for AI work up  -->planned for outpt PAT and cMRI, which can be done as inpatient given admission  Digoxin 250mcg daily home dose  Moderate intensity statin Atorva 20  Eliquis 5 BID for Afib CVA risk reduction  EP consult to consider ablation given s/sx pAfib RVR  PT c/s: Patient to be referred to cardiac rehab upon discharge for cardiac conditioning  KEDAR Webb.  Cardiology Attending
See PA note written above, for details. I reviewed the PA documentation.  I have personally seen and examined this patient today. I reviewed vitals, labs, medications, cardiac studies and additional imaging.  I agree with the PA's findings and plans as written above with the following additions/amendments:  Patient with pAfib, now in NSR rate controlled x 48hr. No chest pain. Troponin neg serial.   Plan for:  Nebulizer therapy, cont all home pulm meds (therapeutic interchanges)  Per pulmonary assessment, patient at her baseline  -->give patient acapella flutter device/aerobika  Awaiting TTE for cardiac structural assessment  CTSx structural team consult (pt known to Alvaro) for AI work up  -->planned for outpatient CPET, cMRI to be done tonight 1/4HS  Digoxin 250mcg daily home dose  Moderate intensity statin Atorva 20  Eliquis 5 BID for Afib CVA risk reduction  EP consult to consider ablation given s/sx pAfib RVR  -->deemed patient to be prohibitive risk given chronic pulmonary disease  PT: no therapy needs  Discharge planned for 1/5 at 11AM with access a ride set up by LUIS ALBERTO Webb M.D.  Cardiology Attending .

## 2021-01-04 NOTE — DISCHARGE NOTE PROVIDER - HOSPITAL COURSE
70 year old female with  SHELLFISH/IODINE Allergy, ASA Allergy, PMH tracheobronchomalacia s/p trancehobronchoplasty, lung nodules, severe allergic asthma (on methylprednisolone 4 mg QD and dupilumuab), adrenal insufficiency, bronchiectasis, DM II, HTN, mod-severe AI (by ECHO 9/2020 and follows with Dr John) PAF (Eliquis and digoxin),  who presented to the ED with complaints of palpitations and chest pain. Found to be in AF with RVR by EMS on arrival to site. Patient converted to SR prior to arrival with resolution of symptoms. Patient admitted to tele for further management. Trops negative x2 and patient CP free throughout hospital course. Pt remained in NSR, EP consulted and recommended continuing home Digoxin 0.25 mg QD and Eliquis 5 mg BID. No plan for ablation 2/2 pulmonary comorbidities and would require intubation. Pulm consulted and patient is at baseline pulmonary function and recommending continuing all her home meds. Dr John Structural CTS aware of admission and pt already set up as an outpt for AI workup including CPET with ECHO on 1/12/21 and will coordinate scheduling an outpatient Cardiac MRI. Pt deemed stable for discharge per Dr Rodriguez and will follow up with her cardiologist Dr Leahy in 1-2 weeks.    70 year old female with  SHELLFISH/IODINE Allergy, ASA Allergy, PMH tracheobronchomalacia s/p trancehobronchoplasty, lung nodules, severe allergic asthma (on methylprednisolone 4 mg QD and dupilumuab), adrenal insufficiency, bronchiectasis, DM II, HTN, mod-severe AI (by ECHO 9/2020 and follows with Dr John) PAF (Eliquis and digoxin),  who presented to the ED with complaints of palpitations and chest pain. Found to be in AF with RVR by EMS on arrival to site. Patient converted to SR prior to arrival with resolution of symptoms. Patient admitted to tele for further management. Trops negative x2 and patient CP free throughout hospital course. Pt remained in NSR, EP consulted and recommended continuing home Digoxin 0.25 mg QD and Eliquis 5 mg BID. No plan for ablation 2/2 pulmonary comorbidities and would require intubation. Pulm consulted and patient is at baseline pulmonary function and recommending continuing all her home meds. Dr John Structural CTS aware of admission and pt already set up as an outpt for AI workup including CPET with ECHO on 1/12/21 and will coordinate scheduling an outpatient Cardiac MRI. Pt deemed stable for discharge per Dr Rodriguez and will follow up with her cardiologist Dr Leahy, Dr John in 1-2 weeks. Pt has follow up appt with Dr Zimmerman on 2/3/21 @9:45 am    70 year old female with  SHELLFISH/IODINE Allergy, ASA Allergy, PMH tracheobronchomalacia s/p trancehobronchoplasty, lung nodules, severe allergic asthma (on methylprednisolone 4 mg QD and dupilumuab), adrenal insufficiency, bronchiectasis, DM II, HTN, mod-severe AI (by ECHO 9/2020 and follows with Dr John) PAF (Eliquis and digoxin),  who presented to the ED with complaints of palpitations and chest pain. Found to be in AF with RVR by EMS on arrival to site. Patient converted to SR prior to arrival with resolution of symptoms. Patient admitted to tele for further management. Trops negative x2 and patient CP free throughout hospital course. Pt remained in NSR, EP consulted and recommended continuing home Digoxin 0.25 mg QD and Eliquis 5 mg BID. No plan for ablation 2/2 pulmonary comorbidities and would require intubation. Pulm consulted and patient is at baseline pulmonary function and recommending continuing all her home meds. Cardiac MRI done 1/4, interpretation pending upon discharge but will follow up with Dr John, and will have further AI workup as outpatient including CPET with ECHO on 1/12/21. Pt deemed stable for discharge per Dr Rodriguez and will follow up with her cardiologist Dr Leahy, Dr John in 1-2 weeks. Pt has follow up appt with Dr Zimmerman on 2/3/21 @9:45 am

## 2021-01-04 NOTE — CONSULT NOTE ADULT - SUBJECTIVE AND OBJECTIVE BOX
Shirley Bloom is a 69 yo F with hx of tracheobronchomalacia s/p tachebronchoplasty, severe allergic asthma, adrenal insufficiency, bronchiectasis, DM, HTN, mod to severe AI (by ECHO 9/20) follows with ASCENCION Guillen (on Eliquis and digoxin), presented to the ED on 1/2 with palpitations, chest pain, SOB.  Found to be in Afib with RVR by EMS,  Patient converted to SR prior to arrival with resolution of symptoms.   Patient was admitted to tele unit for further workup/ management.  Patient has remains in NSR since arrival to hospital and has continued on Eliquis and digoxin.      Upon assessment patient denies chest pain, palpitations, SOB.     PAST MEDICAL & SURGICAL HISTORY:  TIA (transient ischemic attack)  multiple, last 5 years ago - presents as right-sided weakness    DVT (deep venous thrombosis)  15-20 years ago, took coumadin    Seizure  x 1 1/7/18    Rectal bleeding    Colorectal cancer  4/2018- last treatment , chemo and radiation    Tracheobronchomalacia  diagnosed 2015, s/p bronchial thermoplasty 2016 (Dr Zapien); recent bronchoscopy 6/5/2018 revealed no evidence of tracheobronchomalacia in trachea or bronchial tubes    Asthma    Pelvic fracture    Aortic insufficiency  moderate AR on echo 5/3/2018    Adrenal insufficiency  Medrol daily for over 50 years    COPD (chronic obstructive pulmonary disease)    Diabetes  Type 2    Atrial fibrillation  paroxysmal, on eliquis    Rectal bleeding  exam under anesthesia ([facility]) 2/2018    S/P bronchoscopy  6/5/2018 - [place] (Dr Zapien) no evidence of tracheobronchomalacia in trachea or bronchial tubes    History of tracheomalacia  2015 - attempted tracheal stenting ([facility])- course complicated by obstruction, respiratory failure, multiple CPR attempts -  stent discontinued; 10/20/2016 Tracheobronchoplasty (Prolene Mesh) performed at [facility] by Dr Zapien    H/O pelvic surgery  5 years ago - s/p fracture    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    History of sinus surgery  multiple sinus surgeries    H/O total knee replacement, bilateral  5 years ago    History of partial hysterectomy  30 years ago - fibroids    Physical  Appearance: Normal	  HEENT:   Normal oral mucosa	  Neck: Supple, - jVD  Cardiovascular: Normal S1 S2,+ murmur  Respiratory: Bilateral expiratory wheeze  Gastrointestinal:  Soft, Non-tender, + BS	  Extremities: BLE warm, dry, no edema   Neurologic: Non-focal  Psychiatry: A & O x 3, Mood & affect appropriate        Vital Signs Last 24 Hrs  T(C): 36.3 (04 Jan 2021 06:08), Max: 36.8 (03 Jan 2021 22:22)  T(F): 97.3 (04 Jan 2021 06:08), Max: 98.3 (03 Jan 2021 22:22)  HR: 59 (04 Jan 2021 08:32) (57 - 79)  BP: 152/65 (04 Jan 2021 08:32) (129/58 - 165/69)  BP(mean): --  RR: 18 (04 Jan 2021 08:32) (18 - 18)  SpO2: 98% (04 Jan 2021 08:32) (98% - 100%)                          11.6   7.25  )-----------( 276      ( 04 Jan 2021 06:06 )             39.3       01-04    141  |  102  |  16  ----------------------------<  62<L>  4.0   |  31  |  0.70    Ca    8.8      04 Jan 2021 06:06  Mg     2.0     01-04    TPro  6.9  /  Alb  3.9  /  TBili  <0.2  /  DBili  x   /  AST  SEE NOTE  /  ALT  SEE NOTE  /  AlkPhos  SEE NOTE  01-02          CARDIAC MARKERS ( 03 Jan 2021 05:52 )  x     / <0.01 ng/mL / x     / x     / x      CARDIAC MARKERS ( 02 Jan 2021 16:38 )  x     / <0.01 ng/mL / x     / x     / x          EKG: NSR    MEDICATIONS  (STANDING):  acetylcysteine 10%  Inhalation 6 milliLiter(s) Inhalation three times a day  ALBUTerol    90 MICROgram(s) HFA Inhaler 1 Puff(s) Inhalation every 4 hours  albuterol/ipratropium for Nebulization 3 milliLiter(s) Nebulizer every 6 hours  apixaban 5 milliGRAM(s) Oral every 12 hours  atorvastatin 20 milliGRAM(s) Oral at bedtime  buDESOnide    Inhalation Suspension 0.5 milliGRAM(s) Inhalation two times a day  dextrose 40% Gel 15 Gram(s) Oral once  dextrose 5%. 1000 milliLiter(s) (50 mL/Hr) IV Continuous <Continuous>  dextrose 5%. 1000 milliLiter(s) (100 mL/Hr) IV Continuous <Continuous>  dextrose 50% Injectable 25 Gram(s) IV Push once  dextrose 50% Injectable 12.5 Gram(s) IV Push once  dextrose 50% Injectable 25 Gram(s) IV Push once  digoxin     Tablet 0.25 milliGRAM(s) Oral daily  glucagon  Injectable 1 milliGRAM(s) IntraMuscular once  insulin glargine Injectable (LANTUS) 30 Unit(s) SubCutaneous at bedtime  insulin lispro (ADMELOG) corrective regimen sliding scale   SubCutaneous Before meals and at bedtime  methylPREDNISolone 4 milliGRAM(s) Oral daily  montelukast 10 milliGRAM(s) Oral daily  pantoprazole    Tablet 40 milliGRAM(s) Oral before breakfast  polyethylene glycol 3350 17 Gram(s) Oral two times a day  tiotropium 18 MICROgram(s) Capsule 1 Capsule(s) Inhalation daily  tiotropium 18 MICROgram(s) Capsule 1 Capsule(s) Inhalation daily

## 2021-01-04 NOTE — DISCHARGE NOTE PROVIDER - NSDCFUSCHEDAPPT_GEN_ALL_CORE_FT
RAYRAY RODRIGUEZ ; 01/12/2021 ; [facility]  RAYRAY RODRIGUEZ ; 01/27/2021 ; [facility]  RAYRAY RODRIGUEZ ; 02/02/2021 ; [facility] RAYRAY Amaya ; 02/03/2021 ; [facility] [address]

## 2021-01-04 NOTE — CONSULT NOTE ADULT - ASSESSMENT
Shirley Bloom is a 69 yo F with hx of Afib on Eliquis and Digoxin, AI,DM, trachobronchomalacia, asthma, who presented with Afib w RVR by EMS on site and converted to SR prior to ER arrival on 1/2.   Has since remains in NSR and continues to have workup for AI, pending stress echo and MRI today.     Afib with RVR  -continue Eliquis   -continue Digoxin, follow levels,   K > 4, Mg > 2  -continue workup for AI- will follow pt after plan made  -discussed need for atrial fibrillation follow up.  Can follow with Dr Zimmerman as outpatient and further treatments.   Shirley Bloom is a 71 yo F with hx of Afib on Eliquis and Digoxin, AI,DM, trachobronchomalacia, asthma, who presented with Afib w RVR by EMS on site and converted to SR prior to ER arrival on 1/2.   Has since remains in NSR and continues to have workup for AI, pending stress echo and MRI today.     Afib with RVR  -continue Eliquis   -continue Digoxin, follow levels,   K > 4, Mg > 2  -continue workup for AI- will follow pt after plan made  -discussed need for atrial fibrillation follow up.  Can follow with Dr Zimmerman as outpatient and further treatments on February 3rd, @ 9:45am

## 2021-01-04 NOTE — PHYSICAL THERAPY INITIAL EVALUATION ADULT - PERTINENT HX OF CURRENT PROBLEM, REHAB EVAL
70F who presents to the [facility] ED with complaints of palpitations and dizziness at how with elevated HR. She reports an episode of chest pain in association with palpitations.

## 2021-01-04 NOTE — DISCHARGE NOTE PROVIDER - CARE PROVIDERS DIRECT ADDRESSES
,[email or web address]

## 2021-01-04 NOTE — PROGRESS NOTE ADULT - PROBLEM SELECTOR PLAN 6
-home steroids managed by Pulmonologist    DVT F: Oral intake  E: Replete electrolytes as needed for K<4 and Mg<2  N: DASH Diet    DVT PPX: Eliquis 5 mg BID  Dispo: pending further structural and EP evaluation, NPO for stress Echo     Case discussed w/ Dr Rodriguez -home steroids managed by Pulmonologist    DVT F: Oral intake  E: Replete electrolytes as needed for K<4 and Mg<2  N: DASH Diet    DVT PPX: Eliquis 5 mg BID  Dispo: cardiac MRI , no AM labs and access-a-ride scheduled for 1/5 @ 11 am    Case discussed w/ Dr Rodriguez and Dr John -home steroids managed by Pulmonologist    DVT F: Oral intake  E: Replete electrolytes as needed for K<4 and Mg<2  N: DASH Diet    DVT PPX: Eliquis 5 mg BID  Dispo: cardiac MRI 1/4 evening and NPO X 4 hrs prior, no AM labs and access-a-ride scheduled for 1/5 @ 11 am    Case discussed w/ Dr Rodriguez and Dr John

## 2021-01-04 NOTE — PHYSICAL THERAPY INITIAL EVALUATION ADULT - GENERAL OBSERVATIONS, REHAB EVAL
Received supine in NAD, denies pain +EKG, IV hep. left seated at EOB in NAD +RN Allison whitley, call bailey Received supine in NAD, denies pain +EKG, IV hep, ostomy. left seated at EOB in NAD +RN Allison whitley, call bailey

## 2021-01-04 NOTE — PROGRESS NOTE ADULT - ASSESSMENT
Patient is a 70 year old female with  SHELLFISH/IODINE Allergy, ASA Allergy, PMH tracheobronchomalacia s/p trancehobronchoplasty, severe allergic asthma, adrenal insufficiency, bronchiectasis, DM, HTN, mod-severe AI (by ECHO 9/2020 and follows with Dr John) PAF (Eliquis and digoxin),  who presented to the ED with complaints of palpitations and chest pain. Found to be in AF with RVR by EMS on arrival to site. Patient converted to SR prior to arrival with resolution of symptoms. Patient admitted to tele for further management and undergoing structural AI workup 
Patient is a 70 year old female with  SHELLFISH/IODINE Allergy, ASA Allergy, PMH tracheobronchomalacia s/p trancehobronchoplasty, severe allergic asthma, adrenal insufficiency, bronchiectasis, DM, HTN, mod-severe AI (by ECHO 9/2020 and follows with Dr John) PAF (Eliquis and digoxin),  who presented to the ED with complaints of palpitations and chest pain. Found to be in AF with RVR by EMS on arrival to site. Patient converted to SR prior to arrival with resolution of symptoms. Patient admitted to tele for further management.

## 2021-01-04 NOTE — DISCHARGE NOTE PROVIDER - PROVIDER TOKENS
PROVIDER:[[id number]],ESTABLISHEDPATIENT:[T]],PROVIDER:[TOKEN:[[id number]],ESTABLISHEDPATIENT:[T]] PROVIDER:[TOKEN:[[id number]],ESTABLISHEDPATIENT:[T]],PROVIDER:[TOKEN:[[id number]],ESTABLISHEDPATIENT:[T]],PROVIDER:[TOKEN:[[id number]],SCHEDULEDAPPT:[02/03/2021],SCHEDULEDAPPTTIME:[09:45 AM]] PROVIDER:[TOKEN:[[id number]],FOLLOWUP:[2 weeks],ESTABLISHEDPATIENT:[T]],PROVIDER:[TOKEN:[[id number]],SCHEDULEDAPPT:[01/12/2021],ESTABLISHEDPATIENT:[T]],PROVIDER:[TOKEN:[[id number]],SCHEDULEDAPPT:[02/03/2021],SCHEDULEDAPPTTIME:[09:45 AM]]

## 2021-01-04 NOTE — DISCHARGE NOTE PROVIDER - NSDCMRMEDTOKEN_GEN_ALL_CORE_FT
apixaban 5 mg oral tablet: 1 tab(s) orally every 12 hours  Crestor 5 mg oral tablet: 1 tab(s) orally once a day  digoxin 250 mcg (0.25 mg) oral tablet: 1 tab(s) orally once a day  Dupixent 300 mg/2 mL subcutaneous solution: 1  subcutaneous every 2 weeks  ipratropium-albuterol 0.5 mg-2.5 mg/3 mLinhalation solution: 3 milliliter(s) inhaled every 6 hours  Lantus 100 units/mL subcutaneous solution: 12 unit(s) subcutaneous once a day (in the morning)  montelukast 10 mg oral tablet: 1 tab(s) orally once a day   Perforomist 20 mcg/2 mL inhalation solution: 2 milliliter(s) inhaled 2 times a day  Protonix 40 mg oral delayed release tablet: 1 tab(s) orally once a day - 1/2 hour before breakfast  tiotropium 18 mcg inhalation capsule: 1 cap(s) inhaled once a day  Victoza 18 mg/3 mL subcutaneous solution: 1.8 milligram(s) subcutaneous once a day   acetylcysteine 10% inhalation solution: 6 milliliter(s) inhaled 3 times a day  apixaban 5 mg oral tablet: 1 tab(s) orally every 12 hours  Crestor 5 mg oral tablet: 1 tab(s) orally once a day  digoxin 250 mcg (0.25 mg) oral tablet: 1 tab(s) orally once a day  Dupixent 300 mg/2 mL subcutaneous solution: 1  subcutaneous every 2 weeks  ipratropium-albuterol 0.5 mg-2.5 mg/3 mLinhalation solution: 3 milliliter(s) inhaled every 6 hours  Lantus 100 units/mL subcutaneous solution: 12 unit(s) subcutaneous once a day (in the morning)  methylPREDNISolone 4 mg oral tablet: 1 tab(s) orally once a day  montelukast 10 mg oral tablet: 1 tab(s) orally once a day   Perforomist 20 mcg/2 mL inhalation solution: 2 milliliter(s) inhaled 2 times a day  Protonix 40 mg oral delayed release tablet: 1 tab(s) orally once a day - 1/2 hour before breakfast  tiotropium 18 mcg inhalation capsule: 1 cap(s) inhaled once a day  Victoza 18 mg/3 mL subcutaneous solution: 1.8 milligram(s) subcutaneous once a day

## 2021-01-04 NOTE — DISCHARGE NOTE PROVIDER - INSTRUCTIONS
We recommend a Mediterranean diet: Some suggestions include continue incorporating 2 or more servings per day of vegetables, fruits, and whole grains. Increase intake of fish and legumes/beans to 2 or more servings per week. Aim to increase intake of healthy fats, such as olive oil and avocados, and have a handful of nuts/seeds most days. Reduce red/processed meat consumption to 2 or fewer times per week.

## 2021-01-04 NOTE — PROGRESS NOTE ADULT - PROBLEM SELECTOR PLAN 3
Follows with Dr Eulalio Allison, pulm consulted and signed off  -complex pulm history including bronchiectasis, tracheobronchomalacia s/p tracheobronchoplasty 2016 (Dr Zapine); recent bronchoscopy 6/5/2018 revealed no evidence of tracheobronchomalacia in trachea or bronchial tubes  -home meds: dupilumuab [id number], spiriva 18 mcg QD, montelukast 10 mg QD, performist 2mL neb BID, ipratropium/albuterol 3mL neb Q6hrs, methylprednisolone  4mg QD, mucomyst TID  -Performist interchanged with standing Duonebs Q6hrs  -c/w montelukast 10 mg QD, spiriva 18 mcg WD, budesonide 0.5 mg BID and methylprednisolone 4 mg QD
Follows with Dr Eulalio Allison, pulm consulted   -complex pulm history including bronchiectasis, tracheobronchomalacia s/p tracheobronchoplasty 2016 (Dr Zapien); recent bronchoscopy 6/5/2018 revealed no evidence of tracheobronchomalacia in trachea or bronchial tubes  -home meds: dupilumuab [id number], spiriva 18 mcg QD, montelukast 10 mg QD, performist 2mL neb BID, ipratropium/albuterol 3mL neb Q6hrs, methylprednisolone  4mg QD  -Performist interchanged with standing Duonebs Q6hrs  -c/w montelukast 10 mg QD, spiriva 18 mcg WD, budesonide 0.5 mg BID and methylprednisolone 4 mg QD

## 2021-01-04 NOTE — PROGRESS NOTE ADULT - PROBLEM SELECTOR PLAN 2
Moderate-severe by ECHO 9/28/20 (see full report above)  -Follows with Dr John and CTS structural consulted   -repeat ECHO, stress ECHO and cardiac MRI ordered Moderate-severe by ECHO 9/28/20 (see full report above)  -Follows with Dr John and CTS structural consulted   -cardiac MRI ordered  -as per Dr John pt has oupt CPET w/ ECHO scheduled on 1/12/21 and does not need to stay inpt for AI w/up

## 2021-01-04 NOTE — PROGRESS NOTE ADULT - SUBJECTIVE AND OBJECTIVE BOX
Interventional Cardiology PA Adult Progress Note    Subjective Assessment: pt seen and examined at bedside this am. no complaints at this time. Denies CP, SOB, dizziness, palpitations.    ROS negative except for subjective and HPI   	  MEDICATIONS:  digoxin     Tablet 0.25 milliGRAM(s) Oral daily      acetylcysteine 10%  Inhalation 6 milliLiter(s) Inhalation three times a day  ALBUTerol    90 MICROgram(s) HFA Inhaler 1 Puff(s) Inhalation every 4 hours  albuterol/ipratropium for Nebulization 3 milliLiter(s) Nebulizer every 6 hours  buDESOnide    Inhalation Suspension 0.5 milliGRAM(s) Inhalation two times a day  montelukast 10 milliGRAM(s) Oral daily  tiotropium 18 MICROgram(s) Capsule 1 Capsule(s) Inhalation daily  tiotropium 18 MICROgram(s) Capsule 1 Capsule(s) Inhalation daily      pantoprazole    Tablet 40 milliGRAM(s) Oral before breakfast  polyethylene glycol 3350 17 Gram(s) Oral two times a day    atorvastatin 20 milliGRAM(s) Oral at bedtime  dextrose 40% Gel 15 Gram(s) Oral once  dextrose 50% Injectable 25 Gram(s) IV Push once  dextrose 50% Injectable 12.5 Gram(s) IV Push once  dextrose 50% Injectable 25 Gram(s) IV Push once  glucagon  Injectable 1 milliGRAM(s) IntraMuscular once  insulin glargine Injectable (LANTUS) 30 Unit(s) SubCutaneous at bedtime  insulin lispro (ADMELOG) corrective regimen sliding scale   SubCutaneous Before meals and at bedtime  methylPREDNISolone 4 milliGRAM(s) Oral daily    apixaban 5 milliGRAM(s) Oral every 12 hours  dextrose 5%. 1000 milliLiter(s) IV Continuous <Continuous>  dextrose 5%. 1000 milliLiter(s) IV Continuous <Continuous>      	    [PHYSICAL EXAM:  TELEMETRY:  T(C): 36.3 (01-04-21 @ 06:08), Max: 36.8 (01-03-21 @ 22:22)  HR: 57 (01-04-21 @ 05:40) (57 - 79)  BP: 129/58 (01-04-21 @ 05:40) (129/58 - 165/69)  RR: 18 (01-04-21 @ 05:40) (18 - 18)  SpO2: 98% (01-04-21 @ 05:40) (98% - 100%)  Wt(kg): --  I&O's Summary    03 Jan 2021 07:01  -  04 Jan 2021 07:00  --------------------------------------------------------  IN: 480 mL / OUT: 0 mL / NET: 480 mL        Amezcua:  Central/PICC/Mid Line:                                         Appearance: Normal	  HEENT:   Normal oral mucosa, PERRL, EOMI	  Neck: Supple,  - JVD  Cardiovascular: Normal S1 S2, No JVD, +murmur   Respiratory: diffuse wheezing/coarse breath sounds (baseline)   Gastrointestinal:  Soft, Non-tender, + BS	  Skin: No rashes, No ecchymoses, No cyanosis  Extremities: Normal range of motion, No clubbing, cyanosis or edema  Vascular: Peripheral pulses palpable 2+ bilaterally  Neurologic: Non-focal  Psychiatry: A & O x 3, Mood & affect appropriate      	    ECG:  	  RADIOLOGY:   DIAGNOSTIC TESTING:  [ ] Echocardiogram:  [ ]  Catheterization:  [ ] Stress Test:    [ ] SAFIA  OTHER: 	    LABS:	 	  CARDIAC MARKERS:                                  11.6   7.25  )-----------( 276      ( 04 Jan 2021 06:06 )             39.3     01-04    141  |  102  |  16  ----------------------------<  62<L>  4.0   |  31  |  0.70    Ca    8.8      04 Jan 2021 06:06  Mg     2.0     01-04    TPro  6.9  /  Alb  3.9  /  TBili  <0.2  /  DBili  x   /  AST  SEE NOTE  /  ALT  SEE NOTE  /  AlkPhos  SEE NOTE  01-02    proBNP:   Lipid Profile:   HgA1c:   TSH:       ASSESSMENT/PLAN: 	        DVT ppx:  Dispo:     Interventional Cardiology PA Adult Progress Note    Subjective Assessment: pt seen and examined at bedside this am. pt endorses feeling thirsty. Denies CP, SOB, dizziness, palpitations.    ROS negative except for subjective and HPI   	  MEDICATIONS:  digoxin     Tablet 0.25 milliGRAM(s) Oral daily      acetylcysteine 10%  Inhalation 6 milliLiter(s) Inhalation three times a day  ALBUTerol    90 MICROgram(s) HFA Inhaler 1 Puff(s) Inhalation every 4 hours  albuterol/ipratropium for Nebulization 3 milliLiter(s) Nebulizer every 6 hours  buDESOnide    Inhalation Suspension 0.5 milliGRAM(s) Inhalation two times a day  montelukast 10 milliGRAM(s) Oral daily  tiotropium 18 MICROgram(s) Capsule 1 Capsule(s) Inhalation daily  tiotropium 18 MICROgram(s) Capsule 1 Capsule(s) Inhalation daily      pantoprazole    Tablet 40 milliGRAM(s) Oral before breakfast  polyethylene glycol 3350 17 Gram(s) Oral two times a day    atorvastatin 20 milliGRAM(s) Oral at bedtime  dextrose 40% Gel 15 Gram(s) Oral once  dextrose 50% Injectable 25 Gram(s) IV Push once  dextrose 50% Injectable 12.5 Gram(s) IV Push once  dextrose 50% Injectable 25 Gram(s) IV Push once  glucagon  Injectable 1 milliGRAM(s) IntraMuscular once  insulin glargine Injectable (LANTUS) 30 Unit(s) SubCutaneous at bedtime  insulin lispro (ADMELOG) corrective regimen sliding scale   SubCutaneous Before meals and at bedtime  methylPREDNISolone 4 milliGRAM(s) Oral daily    apixaban 5 milliGRAM(s) Oral every 12 hours  dextrose 5%. 1000 milliLiter(s) IV Continuous <Continuous>  dextrose 5%. 1000 milliLiter(s) IV Continuous <Continuous>      	    [PHYSICAL EXAM:  TELEMETRY:  T(C): 36.3 (01-04-21 @ 06:08), Max: 36.8 (01-03-21 @ 22:22)  HR: 57 (01-04-21 @ 05:40) (57 - 79)  BP: 129/58 (01-04-21 @ 05:40) (129/58 - 165/69)  RR: 18 (01-04-21 @ 05:40) (18 - 18)  SpO2: 98% (01-04-21 @ 05:40) (98% - 100%)  Wt(kg): --  I&O's Summary    03 Jan 2021 07:01  -  04 Jan 2021 07:00  --------------------------------------------------------  IN: 480 mL / OUT: 0 mL / NET: 480 mL        Amezcua:  Central/PICC/Mid Line:                                         Appearance: Normal	  HEENT:   Normal oral mucosa, PERRL, EOMI	  Neck: Supple,  - JVD  Cardiovascular: Normal S1 S2, No JVD, +murmur   Respiratory: diffuse wheezing/coarse breath sounds (baseline)   Gastrointestinal:  Soft, Non-tender, + BS	  Skin: No rashes, No ecchymoses, No cyanosis  Extremities: Normal range of motion, No clubbing, cyanosis or edema  Vascular: Peripheral pulses palpable 2+ bilaterally  Neurologic: Non-focal  Psychiatry: A & O x 3, Mood & affect appropriate      	    ECG:  	  RADIOLOGY:   DIAGNOSTIC TESTING:  [ ] Echocardiogram:  [ ]  Catheterization:  [ ] Stress Test:    [ ] SAFIA  OTHER: 	    LABS:	 	  CARDIAC MARKERS:                                  11.6   7.25  )-----------( 276      ( 04 Jan 2021 06:06 )             39.3     01-04    141  |  102  |  16  ----------------------------<  62<L>  4.0   |  31  |  0.70    Ca    8.8      04 Jan 2021 06:06  Mg     2.0     01-04    TPro  6.9  /  Alb  3.9  /  TBili  <0.2  /  DBili  x   /  AST  SEE NOTE  /  ALT  SEE NOTE  /  AlkPhos  SEE NOTE  01-02    proBNP:   Lipid Profile:   HgA1c:   TSH:       ASSESSMENT/PLAN: 	        DVT ppx:  Dispo:     Interventional Cardiology PA Adult Progress Note    Subjective Assessment: pt seen and examined at bedside this am. pt without any acute complaints. Denies CP, SOB, dizziness, palpitations.    ROS negative except for subjective and HPI   	  MEDICATIONS:  digoxin     Tablet 0.25 milliGRAM(s) Oral daily      acetylcysteine 10%  Inhalation 6 milliLiter(s) Inhalation three times a day  ALBUTerol    90 MICROgram(s) HFA Inhaler 1 Puff(s) Inhalation every 4 hours  albuterol/ipratropium for Nebulization 3 milliLiter(s) Nebulizer every 6 hours  buDESOnide    Inhalation Suspension 0.5 milliGRAM(s) Inhalation two times a day  montelukast 10 milliGRAM(s) Oral daily  tiotropium 18 MICROgram(s) Capsule 1 Capsule(s) Inhalation daily  tiotropium 18 MICROgram(s) Capsule 1 Capsule(s) Inhalation daily      pantoprazole    Tablet 40 milliGRAM(s) Oral before breakfast  polyethylene glycol 3350 17 Gram(s) Oral two times a day    atorvastatin 20 milliGRAM(s) Oral at bedtime  dextrose 40% Gel 15 Gram(s) Oral once  dextrose 50% Injectable 25 Gram(s) IV Push once  dextrose 50% Injectable 12.5 Gram(s) IV Push once  dextrose 50% Injectable 25 Gram(s) IV Push once  glucagon  Injectable 1 milliGRAM(s) IntraMuscular once  insulin glargine Injectable (LANTUS) 30 Unit(s) SubCutaneous at bedtime  insulin lispro (ADMELOG) corrective regimen sliding scale   SubCutaneous Before meals and at bedtime  methylPREDNISolone 4 milliGRAM(s) Oral daily    apixaban 5 milliGRAM(s) Oral every 12 hours  dextrose 5%. 1000 milliLiter(s) IV Continuous <Continuous>  dextrose 5%. 1000 milliLiter(s) IV Continuous <Continuous>      	    [PHYSICAL EXAM:  TELEMETRY:  T(C): 36.3 (01-04-21 @ 06:08), Max: 36.8 (01-03-21 @ 22:22)  HR: 57 (01-04-21 @ 05:40) (57 - 79)  BP: 129/58 (01-04-21 @ 05:40) (129/58 - 165/69)  RR: 18 (01-04-21 @ 05:40) (18 - 18)  SpO2: 98% (01-04-21 @ 05:40) (98% - 100%)  Wt(kg): --  I&O's Summary    03 Jan 2021 07:01  -  04 Jan 2021 07:00  --------------------------------------------------------  IN: 480 mL / OUT: 0 mL / NET: 480 mL        Amezcua:  Central/PICC/Mid Line:                                         Appearance: Normal	  HEENT:   Normal oral mucosa, PERRL, EOMI	  Neck: Supple,  - JVD  Cardiovascular: Normal S1 S2, No JVD, +murmur   Respiratory: diffuse wheezing/coarse breath sounds (baseline)   Gastrointestinal:  Soft, Non-tender, + BS	  Skin: No rashes, No ecchymoses, No cyanosis  Extremities: Normal range of motion, No clubbing, cyanosis or edema  Vascular: Peripheral pulses palpable 2+ bilaterally  Neurologic: Non-focal  Psychiatry: A & O x 3, Mood & affect appropriate      	    ECG:  	  RADIOLOGY:   DIAGNOSTIC TESTING:  [ ] Echocardiogram:  [ ]  Catheterization:  [ ] Stress Test:    [ ] SAFIA  OTHER: 	    LABS:	 	  CARDIAC MARKERS:                                  11.6   7.25  )-----------( 276      ( 04 Jan 2021 06:06 )             39.3     01-04    141  |  102  |  16  ----------------------------<  62<L>  4.0   |  31  |  0.70    Ca    8.8      04 Jan 2021 06:06  Mg     2.0     01-04    TPro  6.9  /  Alb  3.9  /  TBili  <0.2  /  DBili  x   /  AST  SEE NOTE  /  ALT  SEE NOTE  /  AlkPhos  SEE NOTE  01-02    proBNP:   Lipid Profile:   HgA1c:   TSH:       ASSESSMENT/PLAN: 	        DVT ppx:  Dispo:

## 2021-01-04 NOTE — DISCHARGE NOTE PROVIDER - NSDCCPCAREPLAN_GEN_ALL_CORE_FT
PRINCIPAL DISCHARGE DIAGNOSIS  Diagnosis: Paroxysmal A-fib  Assessment and Plan of Treatment: You have an abnormal heart rhythm called atrial fibrillation. When your heart in in this rhythm it beats very quickly and in an irregular pattern.  You were in this rhythm at home and went back into a normal rhythm before arriving to the hospital. Please continue Eliquis 5 mg twice daily and Digoxin 0.25 mg once daily. Please follow up with your cardiologist Dr Leahy in 1-2 weeks, please call to make an appointment.        SECONDARY DISCHARGE DIAGNOSES  Diagnosis: AI (aortic insufficiency)  Assessment and Plan of Treatment: You are already seeing Dr John for evaluation of your heart valve and need to have further imaging for workup. Please keep your outpatient appointment for the pulmonary function testing with echocardiogram scheduled for 1/12/21. Please f     PRINCIPAL DISCHARGE DIAGNOSIS  Diagnosis: Paroxysmal A-fib  Assessment and Plan of Treatment: You have an abnormal heart rhythm called atrial fibrillation. When your heart in in this rhythm it beats very quickly and in an irregular pattern.  You were in this rhythm at home and went back into a normal rhythm before arriving to the hospital. Please continue Eliquis 5 mg twice daily and Digoxin 0.25 mg once daily. Please follow up with your cardiologist Dr Leahy in 1-2 weeks, please call to make an appointment. You have an appointment with the Electrophysiologist Dr Zimmerman on 2/3/21 @ 9:45 am.         SECONDARY DISCHARGE DIAGNOSES  Diagnosis: AI (aortic insufficiency)  Assessment and Plan of Treatment: You are already seeing Dr John for evaluation of your heart valve and need to have further imaging for workup. Please keep your outpatient appointment for the pulmonary function testing with echocardiogram scheduled for 1/12/21. Please follow up with Dr John in 1-2 weeks, please call his office to make an appointment.

## 2021-01-04 NOTE — PHYSICAL THERAPY INITIAL EVALUATION ADULT - ADDITIONAL COMMENTS
independent prior to arrival, no falls in past year, sister's house, 12 steps to enter, no use of AD

## 2021-01-05 ENCOUNTER — TRANSCRIPTION ENCOUNTER (OUTPATIENT)
Age: 71
End: 2021-01-05

## 2021-01-05 VITALS — TEMPERATURE: 98 F

## 2021-01-05 LAB — GLUCOSE BLDC GLUCOMTR-MCNC: 73 MG/DL — SIGNIFICANT CHANGE UP (ref 70–99)

## 2021-01-05 PROCEDURE — 99239 HOSP IP/OBS DSCHRG MGMT >30: CPT

## 2021-01-05 RX ADMIN — TIOTROPIUM BROMIDE 1 CAPSULE(S): 18 CAPSULE ORAL; RESPIRATORY (INHALATION) at 06:10

## 2021-01-05 RX ADMIN — Medication 4 MILLIGRAM(S): at 06:08

## 2021-01-05 RX ADMIN — PANTOPRAZOLE SODIUM 40 MILLIGRAM(S): 20 TABLET, DELAYED RELEASE ORAL at 06:08

## 2021-01-05 RX ADMIN — Medication 3 MILLILITER(S): at 06:09

## 2021-01-05 RX ADMIN — Medication 0.25 MILLIGRAM(S): at 06:08

## 2021-01-05 RX ADMIN — APIXABAN 5 MILLIGRAM(S): 2.5 TABLET, FILM COATED ORAL at 09:29

## 2021-01-05 RX ADMIN — Medication 0.5 MILLIGRAM(S): at 06:09

## 2021-01-05 RX ADMIN — MONTELUKAST 10 MILLIGRAM(S): 4 TABLET, CHEWABLE ORAL at 09:29

## 2021-01-05 RX ADMIN — POLYETHYLENE GLYCOL 3350 17 GRAM(S): 17 POWDER, FOR SOLUTION ORAL at 06:09

## 2021-01-05 RX ADMIN — Medication 3 MILLILITER(S): at 09:29

## 2021-01-05 NOTE — DISCHARGE NOTE NURSING/CASE MANAGEMENT/SOCIAL WORK - NSDCPEELIQUISCOMP_GEN_ALL_CORE
PROVIDER:[TOKEN:[[id number]]] Apixaban/Eliquis is used to treat and prevent blood clots. If you are not able to swallow the tablets whole, they may be crushed and mixed in water, apple juice, or applesauce and promptly taken within four hours. Never skip a dose of Apixaban/Eliquis. If you forget to take your Apixaban/Eliquis, take a dose as soon as you remember. If it is almost time for your next Apixaban/Eliquis dose, wait until then and take a regular dose. DO NOT take an extra pill to ‘catch up’.  NEVER TAKE A DOUBLE DOSE. Notify your doctor that you missed a dose. Take Apixaban/Eliquis at the same time each morning and evening. Apixaban/Eliquis may be taken with other medication or food.

## 2021-01-05 NOTE — DISCHARGE NOTE NURSING/CASE MANAGEMENT/SOCIAL WORK - PATIENT PORTAL LINK FT
You can access the FollowMyHealth Patient Portal offered by [facility] by registering at the following website: [email or web address]. By joining [facility]’s FollowMyHealth portal, you will also be able to view your health information using other applications (apps) compatible with our system.

## 2021-01-06 ENCOUNTER — NON-APPOINTMENT (OUTPATIENT)
Age: 71
End: 2021-01-06

## 2021-01-07 DIAGNOSIS — J44.9 CHRONIC OBSTRUCTIVE PULMONARY DISEASE, UNSPECIFIED: ICD-10-CM

## 2021-01-07 DIAGNOSIS — Z79.52 LONG TERM (CURRENT) USE OF SYSTEMIC STEROIDS: ICD-10-CM

## 2021-01-07 DIAGNOSIS — Z79.4 LONG TERM (CURRENT) USE OF INSULIN: ICD-10-CM

## 2021-01-07 DIAGNOSIS — I48.0 PAROXYSMAL ATRIAL FIBRILLATION: ICD-10-CM

## 2021-01-07 DIAGNOSIS — Z88.0 ALLERGY STATUS TO PENICILLIN: ICD-10-CM

## 2021-01-07 DIAGNOSIS — E27.40 UNSPECIFIED ADRENOCORTICAL INSUFFICIENCY: ICD-10-CM

## 2021-01-07 DIAGNOSIS — I35.1 NONRHEUMATIC AORTIC (VALVE) INSUFFICIENCY: ICD-10-CM

## 2021-01-07 DIAGNOSIS — E11.9 TYPE 2 DIABETES MELLITUS WITHOUT COMPLICATIONS: ICD-10-CM

## 2021-01-07 DIAGNOSIS — J45.50 SEVERE PERSISTENT ASTHMA, UNCOMPLICATED: ICD-10-CM

## 2021-01-09 ENCOUNTER — LABORATORY RESULT (OUTPATIENT)
Age: 71
End: 2021-01-09

## 2021-01-11 ENCOUNTER — FORM ENCOUNTER (OUTPATIENT)
Age: 71
End: 2021-01-11

## 2021-01-11 ENCOUNTER — APPOINTMENT (OUTPATIENT)
Dept: CARDIOTHORACIC SURGERY | Facility: CLINIC | Age: 71
End: 2021-01-11
Payer: MEDICARE

## 2021-01-12 ENCOUNTER — OUTPATIENT (OUTPATIENT)
Dept: OUTPATIENT SERVICES | Facility: HOSPITAL | Age: 71
LOS: 1 days | End: 2021-01-12
Payer: MEDICARE

## 2021-01-12 DIAGNOSIS — Z98.89 OTHER SPECIFIED POSTPROCEDURAL STATES: Chronic | ICD-10-CM

## 2021-01-12 DIAGNOSIS — Z87.09 PERSONAL HISTORY OF OTHER DISEASES OF THE RESPIRATORY SYSTEM: Chronic | ICD-10-CM

## 2021-01-12 DIAGNOSIS — I35.1 NONRHEUMATIC AORTIC (VALVE) INSUFFICIENCY: ICD-10-CM

## 2021-01-12 DIAGNOSIS — Z96.653 PRESENCE OF ARTIFICIAL KNEE JOINT, BILATERAL: Chronic | ICD-10-CM

## 2021-01-12 DIAGNOSIS — H05.352 EXOSTOSIS OF LEFT ORBIT: Chronic | ICD-10-CM

## 2021-01-12 DIAGNOSIS — Z98.890 OTHER SPECIFIED POSTPROCEDURAL STATES: Chronic | ICD-10-CM

## 2021-01-12 DIAGNOSIS — K62.5 HEMORRHAGE OF ANUS AND RECTUM: Chronic | ICD-10-CM

## 2021-01-12 PROCEDURE — 94621 CARDIOPULM EXERCISE TESTING: CPT

## 2021-01-12 PROCEDURE — 94621 CARDIOPULM EXERCISE TESTING: CPT | Mod: 26

## 2021-01-12 PROCEDURE — 93351 STRESS TTE COMPLETE: CPT | Mod: 26,52

## 2021-01-12 PROCEDURE — 93351 STRESS TTE COMPLETE: CPT

## 2021-01-18 PROCEDURE — 97161 PT EVAL LOW COMPLEX 20 MIN: CPT

## 2021-01-18 PROCEDURE — 99285 EMERGENCY DEPT VISIT HI MDM: CPT

## 2021-01-18 PROCEDURE — 93005 ELECTROCARDIOGRAM TRACING: CPT

## 2021-01-18 PROCEDURE — 75557 CARDIAC MRI FOR MORPH: CPT

## 2021-01-18 PROCEDURE — 80048 BASIC METABOLIC PNL TOTAL CA: CPT

## 2021-01-18 PROCEDURE — U0003: CPT

## 2021-01-18 PROCEDURE — 36415 COLL VENOUS BLD VENIPUNCTURE: CPT

## 2021-01-18 PROCEDURE — 94640 AIRWAY INHALATION TREATMENT: CPT

## 2021-01-18 PROCEDURE — 71045 X-RAY EXAM CHEST 1 VIEW: CPT

## 2021-01-18 PROCEDURE — 80162 ASSAY OF DIGOXIN TOTAL: CPT

## 2021-01-18 PROCEDURE — 80053 COMPREHEN METABOLIC PANEL: CPT

## 2021-01-18 PROCEDURE — 80061 LIPID PANEL: CPT

## 2021-01-18 PROCEDURE — 93306 TTE W/DOPPLER COMPLETE: CPT

## 2021-01-18 PROCEDURE — 84436 ASSAY OF TOTAL THYROXINE: CPT

## 2021-01-18 PROCEDURE — 84443 ASSAY THYROID STIM HORMONE: CPT

## 2021-01-18 PROCEDURE — U0005: CPT

## 2021-01-18 PROCEDURE — 82962 GLUCOSE BLOOD TEST: CPT

## 2021-01-18 PROCEDURE — 83036 HEMOGLOBIN GLYCOSYLATED A1C: CPT

## 2021-01-18 PROCEDURE — 84484 ASSAY OF TROPONIN QUANT: CPT

## 2021-01-18 PROCEDURE — 85025 COMPLETE CBC W/AUTO DIFF WBC: CPT

## 2021-01-18 PROCEDURE — 85027 COMPLETE CBC AUTOMATED: CPT

## 2021-01-18 PROCEDURE — 83735 ASSAY OF MAGNESIUM: CPT

## 2021-01-18 PROCEDURE — 84132 ASSAY OF SERUM POTASSIUM: CPT

## 2021-01-18 NOTE — ED PROVIDER NOTE - CROS ED ENMT ALL NEG
80 years old man with past medical history significant for coronary artery disease, CKD stage III, sick sinus syndrome, Prostate Ca, mod to severe Pulm HTN, ulcer at the distal aortic arch, pulmonary hypertension, COPD who presented wit SOB. 
 
Pt on 5 L/M on O2 at home with chronic WHEAT, got significantly worse acutely over 24 hours 
Denies fever, chills, cough,CP, LE edema, sick contactPt seen and examined.  
 
On exam, Decreased air entry on exam, no wheezing, looks on the dry side with no edema at all.  
 
A&P 
Looks dry on exam, although weight is up from 88 to 92.5 Kg, Not convinced that this is only Pulm edema, will lower lasix to 40 daily 
Awaiting COVID 19 PCR, if neg and no improvement of SOB then we need to get CT chest and may need to r/o other causes like PCP as pt is on chronic prednisone and ILD from Chemo 
R/o atypicals. Check inf markers 
Procal neg 
Cont Abx 
Start P&P and Atroven nebs 
Pulm consult 
 
 
 
 negative...

## 2021-01-19 ENCOUNTER — APPOINTMENT (OUTPATIENT)
Dept: INTERNAL MEDICINE | Facility: CLINIC | Age: 71
End: 2021-01-19
Payer: MEDICARE

## 2021-01-19 DIAGNOSIS — D84.1 DEFECTS IN THE COMPLEMENT SYSTEM: ICD-10-CM

## 2021-01-19 DIAGNOSIS — S91.339A PUNCTURE WOUND W/OUT FOREIGN BODY, UNSPECIFIED FOOT, INITIAL ENCOUNTER: ICD-10-CM

## 2021-01-19 PROCEDURE — 99443: CPT | Mod: 95

## 2021-01-19 NOTE — HISTORY OF PRESENT ILLNESS
[Home] : at home, [unfilled] , at the time of the visit. [Other Location: e.g. Home (Enter Location, City,State)___] : at [unfilled] [Verbal consent obtained from patient] : the patient, [unfilled] [de-identified] : 71 y/o female, patient of Dr Honeycutt's, presents for f/u. Has many chronic problems. \par She was at [facility] several weeks ago with "out of control AFIB." Discharged with f.u tomorrow with cardiolgoist. SHe wanted to discuss her test results with Dr Honeycutt. ECHO, stress test and cardiac MRI reviewed with patient. All stable/non-diagnostic. She already knew what was found. \par She sustained a wound on her R lateral foot while cooking last week. Can't "really see it". Saw podiatry who gave her antibiotic ointment. which she just wanted yesteday. Isn't sure "that it's working". No bleeding or oozing to her knowledge. Has VNS coming in but needs us to arrange for this visit. No fever/chills. No rednness up her leg.\par She has allergy to TDAP.

## 2021-01-19 NOTE — ASSESSMENT
[FreeTextEntry1] : 1. Her cardiac tests were non-revealing. Dr John to discuss further mangement tomorrow with her.\par 2. She is immunocompromised, diabetic and on steroids. She has a h/lo MRSA infections. She needs that wound addresses ASAP. She should use the topical antibtioics and have VNS look at wound this week. Counselled as to signs of systemic infection. Low threshold to start PO abx. \par TDAP contraindication. \par

## 2021-01-20 ENCOUNTER — APPOINTMENT (OUTPATIENT)
Dept: CARDIOTHORACIC SURGERY | Facility: CLINIC | Age: 71
End: 2021-01-20
Payer: MEDICARE

## 2021-01-20 PROCEDURE — 99214 OFFICE O/P EST MOD 30 MIN: CPT | Mod: 95

## 2021-01-20 NOTE — H&P ADULT - PROBLEM/PLAN-8
DISPLAY PLAN FREE TEXT Calcipotriene Pregnancy And Lactation Text: This medication has not been proven safe during pregnancy. It is unknown if this medication is excreted in breast milk.

## 2021-01-21 ENCOUNTER — LABORATORY RESULT (OUTPATIENT)
Age: 71
End: 2021-01-21

## 2021-01-21 NOTE — HISTORY OF PRESENT ILLNESS
[FreeTextEntry1] : \par This visit was provided via telehealth using real-time 2-way audio visual technology. The patient, RAYRAY RODRIGUEZ, was located at home, [address] [place] [address] , at the time of the visit. The provider was located at [address] [place] [address]. The patient, RAYRAY RODRIGUEZ, Dr. Dex John and MARY AVELAR all participated in the telehealth encounter. Verbal consent given on 01/20/2021 by RAYRAY CRANEVIK.\par \par \par 70 year old female with an Aspirin/Contrast Dye Allergy, with a strong family history of Coronary artery disease, history of hyperlipidemia, DM (on insulin), Asthma/COPD (currently on steroids), History of multiple PNAs (followed by Dr. Allison), Tracheobronchomalacia s/p Right VATS, robotic assisted tracheobronchoplasty with Prolene Mesh in 2016 followed by bronchoscopy with Holmium laser on 02/14/2020, history of TIAs, Squamous Cell Carcinoma of the anus s/p radiation/chemo s/p surgical excision in 2019, GERD, Atrial fibrillation (currently on Eliquis), chronic diastolic heart failure with aortic regurgitation who presents for follow up. \par \par Since her last visit, the patient was admitted to [facility] ED complaining of palpitations and chest pain. She was found to be in rapid AFIB by EMS and was in sinus rhythm at the ED.  The patient was admitted to cardiac telemetry for monitoring.  \par \par Since her discharge, the patient states she is at her baseline. She admits to dyspnea with ambulation.  She denies any chest pain, palpitations, dizziness, syncope, or LE edema. The patient does admit to taking a blood pressure log and has noticed her BP being elevated ranging from 130s-180s with HR 60-70s. \par

## 2021-01-22 ENCOUNTER — OUTPATIENT (OUTPATIENT)
Dept: OUTPATIENT SERVICES | Facility: HOSPITAL | Age: 71
LOS: 1 days | Discharge: HOME | End: 2021-01-22

## 2021-01-22 DIAGNOSIS — C18.9 MALIGNANT NEOPLASM OF COLON, UNSPECIFIED: ICD-10-CM

## 2021-01-22 DIAGNOSIS — Z98.89 OTHER SPECIFIED POSTPROCEDURAL STATES: Chronic | ICD-10-CM

## 2021-01-22 DIAGNOSIS — H05.352 EXOSTOSIS OF LEFT ORBIT: Chronic | ICD-10-CM

## 2021-01-22 DIAGNOSIS — Z98.890 OTHER SPECIFIED POSTPROCEDURAL STATES: Chronic | ICD-10-CM

## 2021-01-22 DIAGNOSIS — Z87.09 PERSONAL HISTORY OF OTHER DISEASES OF THE RESPIRATORY SYSTEM: Chronic | ICD-10-CM

## 2021-01-22 DIAGNOSIS — Z96.653 PRESENCE OF ARTIFICIAL KNEE JOINT, BILATERAL: Chronic | ICD-10-CM

## 2021-01-22 DIAGNOSIS — K62.5 HEMORRHAGE OF ANUS AND RECTUM: Chronic | ICD-10-CM

## 2021-01-27 ENCOUNTER — APPOINTMENT (OUTPATIENT)
Dept: HEMATOLOGY ONCOLOGY | Facility: CLINIC | Age: 71
End: 2021-01-27
Payer: MEDICARE

## 2021-01-27 ENCOUNTER — RESULT REVIEW (OUTPATIENT)
Age: 71
End: 2021-01-27

## 2021-01-27 VITALS
OXYGEN SATURATION: 99 % | WEIGHT: 147.05 LBS | RESPIRATION RATE: 16 BRPM | SYSTOLIC BLOOD PRESSURE: 156 MMHG | HEIGHT: 67.6 IN | HEART RATE: 75 BPM | DIASTOLIC BLOOD PRESSURE: 76 MMHG | TEMPERATURE: 97.5 F | BODY MASS INDEX: 22.55 KG/M2

## 2021-01-27 LAB
BASOPHILS # BLD AUTO: 0.01 K/UL — SIGNIFICANT CHANGE UP (ref 0–0.2)
BASOPHILS NFR BLD AUTO: 0.1 % — SIGNIFICANT CHANGE UP (ref 0–2)
EOSINOPHIL # BLD AUTO: 0.12 K/UL — SIGNIFICANT CHANGE UP (ref 0–0.5)
EOSINOPHIL NFR BLD AUTO: 1.3 % — SIGNIFICANT CHANGE UP (ref 0–6)
HCT VFR BLD CALC: 39.5 % — SIGNIFICANT CHANGE UP (ref 34.5–45)
HGB BLD-MCNC: 12.4 G/DL — SIGNIFICANT CHANGE UP (ref 11.5–15.5)
IMM GRANULOCYTES NFR BLD AUTO: 0.4 % — SIGNIFICANT CHANGE UP (ref 0–1.5)
LYMPHOCYTES # BLD AUTO: 1.27 K/UL — SIGNIFICANT CHANGE UP (ref 1–3.3)
LYMPHOCYTES # BLD AUTO: 13.9 % — SIGNIFICANT CHANGE UP (ref 13–44)
MCHC RBC-ENTMCNC: 23.2 PG — LOW (ref 27–34)
MCHC RBC-ENTMCNC: 31.4 G/DL — LOW (ref 32–36)
MCV RBC AUTO: 74 FL — LOW (ref 80–100)
MONOCYTES # BLD AUTO: 0.55 K/UL — SIGNIFICANT CHANGE UP (ref 0–0.9)
MONOCYTES NFR BLD AUTO: 6 % — SIGNIFICANT CHANGE UP (ref 2–14)
NEUTROPHILS # BLD AUTO: 7.14 K/UL — SIGNIFICANT CHANGE UP (ref 1.8–7.4)
NEUTROPHILS NFR BLD AUTO: 78.3 % — HIGH (ref 43–77)
NRBC # BLD: 0 /100 WBCS — SIGNIFICANT CHANGE UP (ref 0–0)
PLATELET # BLD AUTO: 292 K/UL — SIGNIFICANT CHANGE UP (ref 150–400)
RBC # BLD: 5.34 M/UL — HIGH (ref 3.8–5.2)
RBC # FLD: 16 % — HIGH (ref 10.3–14.5)
WBC # BLD: 9.13 K/UL — SIGNIFICANT CHANGE UP (ref 3.8–10.5)
WBC # FLD AUTO: 9.13 K/UL — SIGNIFICANT CHANGE UP (ref 3.8–10.5)

## 2021-01-27 PROCEDURE — 99214 OFFICE O/P EST MOD 30 MIN: CPT

## 2021-01-29 NOTE — HISTORY OF PRESENT ILLNESS
[Disease:__________________________] : Disease: [unfilled] [Home] : at home, [unfilled] , at the time of the visit. [Medical Office: ([facility])___] : at the medical office located in  [Patient] : the patient [Self] : self [de-identified] : Jan 2020\par 68 yo female with a hx of GI malignancy - CRC dx in 2017 s/p chemo/RT, has chronic ostomy, diabetes, afib, tracheobronchomalacia, adrenal insufficiency on chronic steroids, severe Asthma, hx of recurrent infections. Pt recently seen by Immunologist Dr Carmichael and informed of possibl ediagnosis of multiple myeloma due to e/o elevated IgA level on labs and IgA kappa paraprotein on serum immunofixation. Pt has no SLiM- CRAB synptoms or any e/o myeloma defining events on labs at this time. She reports recently told she has pseudomonas infection being followed closely by ID. She also has chronic neuropathy in legs unclear is symptoms due to diabetic neuropathy vs lumbosacral spinal stenosis. Her last CT imaging in Oct 2019 showed no lytic lesions in the bones. \par She is here for initial evaluation to assess for possible plasma cell dyscrasia. On lab review M spike was 0.1g in Jan 2, 2020, IgA level was 558 with normal SFLC levels and nl SFLC ratio. She is currently on ciprofloxacin for +pseudomonas in sputum cx. She is in usual state of chronically ill health, overall with good disposition.  [de-identified] : low-int risk [de-identified] : Pt with PMH tracheobronchomalacia s/p tracheobronchoplasty, lung nodules, severe allergic asthma (on methylprednisolone 4 mg QD and dupilumuab), adrenal insufficiency, bronchiectasis, DM II, HTN, mod-severe AI (by ECHO 9/2020 and follows with Dr John) PAF (Eliquis and digoxin), who presented to the ED at [facility] on 1/2/21 with complaints of palpitations and chest pain. Found to be in AF with RVR by EMS on arrival to site. Patient converted to SR prior to arrival with resolution of symptoms. Patient admitted to tele for further management. Trops negative x2 and patient CP free throughout hospital course. Pt remained in NSR, EP consulted and recommended continuing home Digoxin 0.25 mg QD and Eliquis 5 mg BID. No plan for ablation 2/2 pulmonary comorbidities and would require intubation. Pulm consulted and patient is at baseline pulmonary function and recommending continuing all her home meds. Cardiac MRI done 1/4, to follow up with cardiology (Dr John), and will have further AI workup as outpatient including CPET with ECHO on 1/12/21. Pt discharged from [facility] on 1/6/21.\par Has persistent neuropathy in hands and feet-was prescribed Gabapentin but doesn't take it regularly.  Has persistent dyspnea which is at baseline.  Denies fever, chills, night sweats, cough, abd pain, N/V.

## 2021-01-29 NOTE — ASSESSMENT
[FreeTextEntry1] : \par IgA kappa MGUS\par low-int risk disease\par Stable disease.\par Had recent hospitalization for episode of afib which resolved on its own.  Pt taking  methylprednisolone 4 mg QD and dupilumuab for severe allergic asthma.\par \par Plan\par Monitor IgG, serum FLC's.\par Return in 2 months.\par Follow up with cardiology/pulmonary.\par Report any new symptoms.

## 2021-02-03 ENCOUNTER — NON-APPOINTMENT (OUTPATIENT)
Age: 71
End: 2021-02-03

## 2021-02-03 ENCOUNTER — APPOINTMENT (OUTPATIENT)
Dept: HEART AND VASCULAR | Facility: CLINIC | Age: 71
End: 2021-02-03
Payer: MEDICARE

## 2021-02-03 VITALS
HEIGHT: 67.5 IN | HEART RATE: 85 BPM | BODY MASS INDEX: 22.03 KG/M2 | SYSTOLIC BLOOD PRESSURE: 160 MMHG | WEIGHT: 142 LBS | TEMPERATURE: 97.5 F | DIASTOLIC BLOOD PRESSURE: 71 MMHG

## 2021-02-03 VITALS — OXYGEN SATURATION: 97 %

## 2021-02-03 PROCEDURE — 99214 OFFICE O/P EST MOD 30 MIN: CPT | Mod: 25

## 2021-02-03 PROCEDURE — 93000 ELECTROCARDIOGRAM COMPLETE: CPT

## 2021-02-03 RX ORDER — DIGOXIN 0.25 MG/1
250 TABLET ORAL DAILY
Qty: 90 | Refills: 1 | Status: DISCONTINUED | COMMUNITY
Start: 2019-06-06 | End: 2021-02-03

## 2021-02-03 NOTE — PHYSICAL EXAM
[Normal Appearance] : normal appearance [General Appearance - Well Developed] : well developed [Well Groomed] : well groomed [General Appearance - Well Nourished] : well nourished [No Deformities] : no deformities [General Appearance - In No Acute Distress] : no acute distress [Normal Conjunctiva] : the conjunctiva exhibited no abnormalities [Normal Oral Mucosa] : normal oral mucosa [Normal Jugular Venous V Waves Present] : normal jugular venous V waves present [] : no respiratory distress [Respiration, Rhythm And Depth] : normal respiratory rhythm and effort [Exaggerated Use Of Accessory Muscles For Inspiration] : no accessory muscle use [Heart Rate And Rhythm] : heart rate and rhythm were normal [Heart Sounds] : normal S1 and S2 [Abdomen Soft] : soft [Abnormal Walk] : normal gait [Cyanosis, Localized] : no localized cyanosis [Skin Color & Pigmentation] : normal skin color and pigmentation [Oriented To Time, Place, And Person] : oriented to person, place, and time [Impaired Insight] : insight and judgment were intact [Affect] : the affect was normal [Mood] : the mood was normal [No Anxiety] : not feeling anxious

## 2021-02-04 ENCOUNTER — APPOINTMENT (OUTPATIENT)
Dept: THORACIC SURGERY | Facility: CLINIC | Age: 71
End: 2021-02-04
Payer: MEDICARE

## 2021-02-04 ENCOUNTER — APPOINTMENT (OUTPATIENT)
Dept: HEART AND VASCULAR | Facility: CLINIC | Age: 71
End: 2021-02-04
Payer: MEDICARE

## 2021-02-04 ENCOUNTER — NON-APPOINTMENT (OUTPATIENT)
Age: 71
End: 2021-02-04

## 2021-02-04 VITALS
BODY MASS INDEX: 22.49 KG/M2 | DIASTOLIC BLOOD PRESSURE: 66 MMHG | SYSTOLIC BLOOD PRESSURE: 176 MMHG | TEMPERATURE: 98.8 F | HEIGHT: 67.5 IN | HEART RATE: 82 BPM | OXYGEN SATURATION: 98 % | WEIGHT: 145 LBS

## 2021-02-04 VITALS — SYSTOLIC BLOOD PRESSURE: 120 MMHG | DIASTOLIC BLOOD PRESSURE: 70 MMHG

## 2021-02-04 PROCEDURE — 93000 ELECTROCARDIOGRAM COMPLETE: CPT

## 2021-02-04 PROCEDURE — 99214 OFFICE O/P EST MOD 30 MIN: CPT

## 2021-02-04 PROCEDURE — 99213 OFFICE O/P EST LOW 20 MIN: CPT

## 2021-02-04 RX ORDER — VALACYCLOVIR 1 G/1
1 TABLET, FILM COATED ORAL
Qty: 14 | Refills: 2 | Status: DISCONTINUED | COMMUNITY
Start: 2020-11-30 | End: 2021-02-04

## 2021-02-04 RX ORDER — METHYLPREDNISOLONE 4 MG/1
4 TABLET ORAL
Refills: 0 | Status: DISCONTINUED | COMMUNITY
Start: 2021-01-27 | End: 2021-02-04

## 2021-02-04 RX ORDER — GABAPENTIN 300 MG/1
300 CAPSULE ORAL
Qty: 42 | Refills: 0 | Status: DISCONTINUED | COMMUNITY
Start: 2020-06-12 | End: 2021-02-04

## 2021-02-04 RX ORDER — DILTIAZEM HYDROCHLORIDE 120 MG/1
120 TABLET, EXTENDED RELEASE ORAL
Qty: 30 | Refills: 3 | Status: DISCONTINUED | COMMUNITY
Start: 2021-02-03 | End: 2021-02-04

## 2021-02-04 RX ORDER — GABAPENTIN 400 MG/1
400 CAPSULE ORAL 4 TIMES DAILY
Qty: 120 | Refills: 3 | Status: DISCONTINUED | COMMUNITY
Start: 2020-06-12 | End: 2021-02-04

## 2021-02-04 NOTE — PHYSICAL EXAM
[General Appearance - Well Developed] : well developed [Normal Appearance] : normal appearance [Well Groomed] : well groomed [General Appearance - Well Nourished] : well nourished [No Deformities] : no deformities [General Appearance - In No Acute Distress] : no acute distress [Normal Conjunctiva] : the conjunctiva exhibited no abnormalities [Eyelids - No Xanthelasma] : the eyelids demonstrated no xanthelasmas [Normal Oral Mucosa] : normal oral mucosa [No Oral Pallor] : no oral pallor [No Oral Cyanosis] : no oral cyanosis [Normal Jugular Venous A Waves Present] : normal jugular venous A waves present [Normal Jugular Venous V Waves Present] : normal jugular venous V waves present [No Jugular Venous Espino A Waves] : no jugular venous espino A waves [Heart Rate And Rhythm] : heart rate and rhythm were normal [Heart Sounds] : normal S1 and S2 [Murmurs] : no murmurs present [Respiration, Rhythm And Depth] : normal respiratory rhythm and effort [Exaggerated Use Of Accessory Muscles For Inspiration] : no accessory muscle use [Auscultation Breath Sounds / Voice Sounds] : lungs were clear to auscultation bilaterally [Abdomen Soft] : soft [Abdomen Tenderness] : non-tender [Abdomen Mass (___ Cm)] : no abdominal mass palpated [Abnormal Walk] : normal gait [Gait - Sufficient For Exercise Testing] : the gait was sufficient for exercise testing [Nail Clubbing] : no clubbing of the fingernails [Cyanosis, Localized] : no localized cyanosis [Petechial Hemorrhages (___cm)] : no petechial hemorrhages [Skin Color & Pigmentation] : normal skin color and pigmentation [] : no rash [No Venous Stasis] : no venous stasis [Skin Lesions] : no skin lesions [No Skin Ulcers] : no skin ulcer [No Xanthoma] : no  xanthoma was observed [Oriented To Time, Place, And Person] : oriented to person, place, and time [Affect] : the affect was normal [Mood] : the mood was normal [No Anxiety] : not feeling anxious

## 2021-02-05 ENCOUNTER — NON-APPOINTMENT (OUTPATIENT)
Age: 71
End: 2021-02-05

## 2021-02-05 LAB
CREAT SPEC-SCNC: 129 MG/DL
MICROALBUMIN 24H UR DL<=1MG/L-MCNC: 2.3 MG/DL
MICROALBUMIN/CREAT 24H UR-RTO: 18 MG/G

## 2021-02-05 NOTE — HISTORY OF PRESENT ILLNESS
[FreeTextEntry1] : 70 F referred by Dr. John for BP management. h/o HPL, DM, Asthma/COPD, tracheobronchomalacia s/p R VATS s/p tracheobronchoplasty, multiple TIAs, squamous cell carcinoma of the anus, s/p radiation, chemo and surgery in 2019, GERD, moderate aortic insufficiency, PAF on eliquis. Noticed recently her BP has become higher she checks it when she does not feel right her breathing has gotten worse she is sob just talking. Had been on BP meds before but her BP would drop to the 80's . recent hospitalization for afib with RVR \par \par ecg nsr \par stress echo 9 minutes normal 1/2021\par echo 1/2021 mod to severe AI trileaflet valve mild LVH normal EF

## 2021-02-05 NOTE — HISTORY OF PRESENT ILLNESS
[FreeTextEntry1] : 70 year old female with hyperlipidemia, diabetes, asthma/COPD, tracheobronchomalacia s/p R VATS, robotic assisted tracheobronchoplasty with prolene mesh 2016. multiple TIAs, squamous cell carcinoma of the anus s/p radiation/chemo/surgery 2019, GERD, aortic insufficiency and paroxysmal atrial fibrillation (on ELiquis), who presents for follow up.\par \par WE saw her in the hospital 1/2021 when she was admitted with afib with RVR and self converted before she got to the ER.  She is being followed up by structural heart for her aortic insufficiency with plans for observation and reassessment in 6 months.  She states she has palpitations but they are very infrequent.  SHe correlates them with when her tracheomalacia is acting up.  She has an appointment with Dr. Zapien tomorrow to see if she could have the second part of her procedure.  She states when this acts up her blood pressure gets high and she gets SOB and then has some palpitation - but she believes that it is all related to the tracheomalacia.  She states in the past she has been on Cardizem but then would get low blood pressure.  SHe is maintained on ELiquis.  No chest pain, syncope, near syncope.  \par  \par

## 2021-02-05 NOTE — ASSESSMENT
[FreeTextEntry1] : I reviewed her hospital chart and outpatient chart she has an extensive medical history\par she is on digoxing for PAF we agreed to trial diltiazem 30 mg bid for two weeks\par i don’t suspect she has HTN she appears to be very short of breath and is checking her bp when she is anxious or not feeling well check urine micro\par stop digoxin for now \par she will see CTS for further treatment\par she is optimized from cardiac standpoint for bronchoscopy\par fu in one month

## 2021-02-05 NOTE — HISTORY OF PRESENT ILLNESS
[Home] : at home, [unfilled] , at the time of the visit. [Medical Office: ([facility])___] : at the medical office located in  [Verbal consent obtained from patient] : the patient, [unfilled] [FreeTextEntry1] : 69 y/o female with a PMH of DM on insulin, TIA's, adrenal insuficiency, stage III A, T2N1 squamous cell carcinoma of anus, s/p chemo and radiation, tracheobronchomalacia s/p Right VATS, robotic assisted tracheobronchoplasty with Prolene mesh on 10/25/16. She is currently on Eliquis for Afib. She underwent a bronchoscopy with Holmium laser on 2/14/20. Repeat bronchoscopy on 10/2/20 revealed no evidence of TBM.  Underwent recent cardiac evaluation with Dr. John who reports that the  patient's symptoms are attributed to her pulmonary disease more so than her cardiac. She presents today for a follow up visit. \par \par ECHO completed on 01/23/20:\par -mild annular calcification, otherwise normal mitral valve. Minimal to mild mitral regurgitation\par -thickened aortic valve. Moderate-severe aortic regurgitation\par -mild aortic root dilation\par -mild concentric left ventricular hypertrophy\par -normal left ventricular systolic function. No segmental wall motion abnormalities\par -mild tricuspid regurgitation\par -minimal pulmonic regurgitation\par -normal pericardium with trace pericardial effusion \par \par PFT done on 8/12/20 showed FEV1 = 1.31 ,58 % of predicted value, FVC = 2.42 ,80 % of predicted value, PEF = 4.87 , 84% of predicted value and DLCO =17.8 , 93% of predicted value.\par  \par CT chest completed on 8/19/20:\par - The trachea is narrowed by just greater than 70% during expiration. This could suggest underlying tracheomalacia.\par - No tracheal nodularity or calcification. No bronchiectasis. Secretions in the trachea, right mainstem bronchus, and bronchus intermedius.\par \par CT abdomen and pelvis completed on 8/30/20:\par No new findings.\par A 4 mm right upper lobe pulmonary nodule on prior study is not visualized\par \par PFT done on 1/12/21 showed FEV1 = 1.04, 51% of predicted value, FVC =2.06 ,78 % of predicted value, PEF = 3.92, 76% of predicted value and DLCO = , % of predicted value.\par \par stress ECHO on 1/12/2021:\par The ejection fraction was 70-75%. There was moderate aortic regurgitation. POST-EXERCISE:Overall left ventricular contractility increased with exercise. Segmental wall motion abnormalities were not seen. With stress, normal augmentation of all left ventricular wall segments noted, end systolic volume decreased and the ejection fraction increased. With stress, the left ventricular ejection fraction was >75%. The echocardiogram is non-diagnostic due to inadequate heart rate-systolic blood pressure product achieved however, no ischemic changes seen at 83 % of maximum heart rate achieved.\par \par CPET on 1/12/2021:\par -  the test is consistent with ventilatory limitation in the setting of known obstructive lung disease, with possible cardiac limitation. \par - V02 max 16.1\par \par Cardiac MRI on 1/5/2021:\par 1.  The left ventricle (LV) is normal in size. There is no evidence of LV hypertrophy. Left ventricular global systolic function is reduced The LV ejection fraction is  72 %.2.  There is Moderate to Severe aortic insufficiency3.  The right ventricle (RV) is normal in size. RV global systolic function is Normal4.  No significant abnormalities of the visualized portions of the great vessels. \par

## 2021-02-05 NOTE — DISCUSSION/SUMMARY
[FreeTextEntry1] : 70 year old female with hyperlipidemia, diabetes, asthma/COPD, tracheobronchomalacia s/p R VATS, robotic assisted tracheobronchoplasty with prolene mesh 2016. multiple TIAs, squamous cell carcinoma of the anus s/p radiation/chemo/surgery 2019, GERD, aortic insufficiency and paroxysmal atrial fibrillation (on ELiquis), who presents for follow up.  I am in agreement with her that her SOB that is secondary to tracheomalacia is likely the cause of her increased blood pressure and afib events.  We discussed the importance of optimizing her blood pressure and oxygenation to decrease afib episodes.  Overall, she does not feel that the palpitations are significantly effecting her life and states they are not frequent.  I have offered her an event monitor to understand her afib burden and she will consider this after her workup with Dr. Zapien.  I have recommended stopping digoxin and trying low dose cardizem.  Given her persistently elevated blood pressure I think this would be more beneficial to control her BP and help with her paroxysmal afib.  She is in agreement to trying this.  She will follow up virtually in 3 months or sooner if needed and knows to call with any questions or concerns.

## 2021-02-05 NOTE — ASSESSMENT
[FreeTextEntry1] : 69 y/o female with a PMH of DM on insulin, TIA's, adrenal insuficiency, stage III A, T2N1 squamous cell carcinoma of anus, s/p chemo and radiation, tracheobronchomalacia s/p Right VATS, robotic assisted tracheobronchoplasty with Prolene mesh on 10/25/16. She is currently on Eliquis for Afib. She underwent a bronchoscopy with Holmium laser on 2/14/20. Repeat bronchoscopy on 10/2/20 revealed no evidence of TBM.  Underwent recent cardiac evaluation with Dr. John who reports that the patient's symptoms are attributed to her pulmonary disease more so than her cardiac. She presents today for a follow up visit. \par \par Patient complains of feeling short of breath constantly. She was evaluated by her Cardiologist and will be taken off the digoxin. I explained that I should take a look at her airway to evaluate her collapse, however the last bronchoscopy did not demonstrate collapse. Her PFT's from prior to surgery to recently were reviewed and demonstrates progression of emphysema. \par \par CPET VO2 max 75% - 16.1 -  consistent with ventilatory limitation as per Dr. Patel. Cardiac evaluation was felt not to be contributing to her SOB.  FEV1 51%\par \par I explained that if the airway does not demonstrate excessive airway collapse, then she will need to be managed from an emphysema standpoint. \par \par Will evaluate her medications to see if there are any potential side effects that may be causing her shortness of breath. \par \par Plan:\par 1. Awake bronchoscopy 02/12/21\par 2. Review medications with the patient\par \par I, DIOR BOX , am scribing for and in the presence of [Dr. Zohaib Zapien] the following sections: History of present illness, past Medical/family/surgical/family/social history, review of systems, vital signs, physical exam and disposition.\par  \par I reviewed the documentation recorded by the scribe in my presence and it accurately and completely records my words and actions\par

## 2021-02-09 RX ORDER — KETOCONAZOLE 20 MG/G
2 CREAM TOPICAL
Qty: 60 | Refills: 0 | Status: COMPLETED | COMMUNITY
Start: 2020-08-07 | End: 2021-02-09

## 2021-02-09 RX ORDER — TOBRAMYCIN AND DEXAMETHASONE 3; 1 MG/ML; MG/ML
0.3-0.1 SUSPENSION/ DROPS OPHTHALMIC
Qty: 5 | Refills: 0 | Status: COMPLETED | COMMUNITY
Start: 2020-10-28 | End: 2021-02-09

## 2021-02-09 RX ORDER — NUT.TX.GLUC.INTOLER,LAC-FR,SOY
LIQUID (ML) ORAL
Qty: 360 | Refills: 0 | Status: COMPLETED | COMMUNITY
Start: 2018-07-10 | End: 2021-02-09

## 2021-02-09 RX ORDER — HYDROCORTISONE 25 MG/G
2.5 CREAM TOPICAL
Qty: 1 | Refills: 2 | Status: COMPLETED | COMMUNITY
Start: 2020-11-30 | End: 2021-02-09

## 2021-02-10 RX ORDER — DIGOXIN 0.06 MG/1
TABLET ORAL
Refills: 0 | Status: COMPLETED | COMMUNITY
End: 2021-02-10

## 2021-02-11 ENCOUNTER — TRANSCRIPTION ENCOUNTER (OUTPATIENT)
Age: 71
End: 2021-02-11

## 2021-02-12 ENCOUNTER — RESULT REVIEW (OUTPATIENT)
Age: 71
End: 2021-02-12

## 2021-02-12 ENCOUNTER — APPOINTMENT (OUTPATIENT)
Dept: THORACIC SURGERY | Facility: HOSPITAL | Age: 71
End: 2021-02-12

## 2021-02-12 ENCOUNTER — OUTPATIENT (OUTPATIENT)
Dept: OUTPATIENT SERVICES | Facility: HOSPITAL | Age: 71
LOS: 1 days | Discharge: HOME | End: 2021-02-12
Payer: MEDICARE

## 2021-02-12 DIAGNOSIS — H05.352 EXOSTOSIS OF LEFT ORBIT: Chronic | ICD-10-CM

## 2021-02-12 DIAGNOSIS — Z98.89 OTHER SPECIFIED POSTPROCEDURAL STATES: Chronic | ICD-10-CM

## 2021-02-12 DIAGNOSIS — K62.5 HEMORRHAGE OF ANUS AND RECTUM: Chronic | ICD-10-CM

## 2021-02-12 DIAGNOSIS — Z96.653 PRESENCE OF ARTIFICIAL KNEE JOINT, BILATERAL: Chronic | ICD-10-CM

## 2021-02-12 DIAGNOSIS — Z87.09 PERSONAL HISTORY OF OTHER DISEASES OF THE RESPIRATORY SYSTEM: Chronic | ICD-10-CM

## 2021-02-12 DIAGNOSIS — Z98.890 OTHER SPECIFIED POSTPROCEDURAL STATES: Chronic | ICD-10-CM

## 2021-02-12 LAB — GLUCOSE BLDC GLUCOMTR-MCNC: 162 MG/DL — HIGH (ref 70–99)

## 2021-02-12 PROCEDURE — 31622 DX BRONCHOSCOPE/WASH: CPT

## 2021-02-12 PROCEDURE — 71045 X-RAY EXAM CHEST 1 VIEW: CPT

## 2021-02-12 PROCEDURE — 82962 GLUCOSE BLOOD TEST: CPT

## 2021-02-12 PROCEDURE — 71045 X-RAY EXAM CHEST 1 VIEW: CPT | Mod: 26

## 2021-02-16 ENCOUNTER — RX RENEWAL (OUTPATIENT)
Age: 71
End: 2021-02-16

## 2021-02-25 ENCOUNTER — TRANSCRIPTION ENCOUNTER (OUTPATIENT)
Age: 71
End: 2021-02-25

## 2021-02-26 ENCOUNTER — OUTPATIENT (OUTPATIENT)
Dept: OUTPATIENT SERVICES | Facility: HOSPITAL | Age: 71
LOS: 1 days | Discharge: HOME | End: 2021-02-26
Payer: MEDICARE

## 2021-02-26 ENCOUNTER — APPOINTMENT (OUTPATIENT)
Dept: THORACIC SURGERY | Facility: HOSPITAL | Age: 71
End: 2021-02-26

## 2021-02-26 ENCOUNTER — RESULT REVIEW (OUTPATIENT)
Age: 71
End: 2021-02-26

## 2021-02-26 DIAGNOSIS — Z98.89 OTHER SPECIFIED POSTPROCEDURAL STATES: Chronic | ICD-10-CM

## 2021-02-26 DIAGNOSIS — Z96.653 PRESENCE OF ARTIFICIAL KNEE JOINT, BILATERAL: Chronic | ICD-10-CM

## 2021-02-26 DIAGNOSIS — Z87.09 PERSONAL HISTORY OF OTHER DISEASES OF THE RESPIRATORY SYSTEM: Chronic | ICD-10-CM

## 2021-02-26 DIAGNOSIS — K62.5 HEMORRHAGE OF ANUS AND RECTUM: Chronic | ICD-10-CM

## 2021-02-26 DIAGNOSIS — H05.352 EXOSTOSIS OF LEFT ORBIT: Chronic | ICD-10-CM

## 2021-02-26 DIAGNOSIS — Z98.890 OTHER SPECIFIED POSTPROCEDURAL STATES: Chronic | ICD-10-CM

## 2021-02-26 LAB
GLUCOSE BLDC GLUCOMTR-MCNC: 106 MG/DL — HIGH (ref 70–99)
GLUCOSE BLDC GLUCOMTR-MCNC: 92 MG/DL — SIGNIFICANT CHANGE UP (ref 70–99)

## 2021-02-26 PROCEDURE — 82962 GLUCOSE BLOOD TEST: CPT

## 2021-02-26 PROCEDURE — 93010 ELECTROCARDIOGRAM REPORT: CPT

## 2021-02-26 PROCEDURE — 71045 X-RAY EXAM CHEST 1 VIEW: CPT | Mod: 26

## 2021-02-26 PROCEDURE — 93005 ELECTROCARDIOGRAM TRACING: CPT

## 2021-02-26 PROCEDURE — 31641 BRONCHOSCOPY TREAT BLOCKAGE: CPT

## 2021-02-26 PROCEDURE — 71045 X-RAY EXAM CHEST 1 VIEW: CPT

## 2021-02-26 PROCEDURE — C1889: CPT

## 2021-02-26 NOTE — CONSULT NOTE ADULT - ASSESSMENT
71 y/o F with history of asthma, COPD, tracheobronchomalacia (s/p tracheobronchoplasty in 2016), TIA in the past, pAFIB, aortic insufficiency with a normal LVEF on recent Echo.  Here for flexible bronch with tracheobronchoscopy.  EKG shows sinus rhythm with ventricular bigeminy with an effective HR at 40s bpm.  PVC with likely LVOT origin (RBBB / inferior axis).  Not really having symptoms (has baseline fatigue).    - Pt seen at bedside with Dr. Perez.  No contraindication for pt to go forward to procedure today.  Left message for Dr. Zapien.    - Telemetry post procedure.

## 2021-02-26 NOTE — CONSULT NOTE ADULT - SUBJECTIVE AND OBJECTIVE BOX
Electrophysiology Consult Note:     CHIEF COMPLAINT:  Patient is a 70y old  Female who presents with a chief complaint of       HISTORY OF PRESENT ILLNESS:   69 y/o F with history of asthma, COPD, tracheobronchomalacia (s/p tracheobronchoplasty in 2016), TIA in the past, annal SCC treated with radiation/chemo/sx 2019, aortic insufficiency (moderate-severe on recent echo- for observation for now per Structural heart), and paroxysmal AIFB.  She had one episode of AFIB RVR with ER visit in January 2021; self converted before she actually got to the ER.   We recommended stopping digoxin and switching it to low dose Cardizem 30 mg BID.  She took it this morning. She presents today for bronch and laser tracheobronchoscopy.  EP is consulting for bradycardia on Vital Sign.  EKG this morning showed sinus rhythm with ventricular bigeminy.  These PVC are not picked up by pulse check and hence the bradycardia.   Pt is not having dizziness / syncope / near-syncope.  Reports baseline fatigue.            PAST MEDICAL & SURGICAL HISTORY:  TIA (transient ischemic attack)  multiple, last 5 years ago - presents as right-sided weakness    DVT (deep venous thrombosis)  15-20 years ago, took coumadin    Seizure  x 1 1/7/18    Rectal bleeding    Colorectal cancer  4/2018- last treatment , chemo and radiation    Tracheobronchomalacia  diagnosed 2015, s/p bronchial thermoplasty 2016 (Dr Zapien); recent bronchoscopy 6/5/2018 revealed no evidence of tracheobronchomalacia in trachea or bronchial tubes    Asthma    Pelvic fracture    Aortic insufficiency  moderate AR on echo 5/3/2018    Adrenal insufficiency  Medrol daily for over 50 years    COPD (chronic obstructive pulmonary disease)    Diabetes  Type 2    Atrial fibrillation  paroxysmal, on eliquis    Rectal bleeding  exam under anesthesia ([facility]) 2/2018    S/P bronchoscopy  6/5/2018 - [facility] (Dr Zapien) no evidence of tracheobronchomalacia in trachea or bronchial tubes    History of tracheomalacia  2015 - attempted tracheal stenting ([facility])- course complicated by obstruction, respiratory failure, multiple CPR attempts -  stent discontinued; 10/20/2016 Tracheobronchoplasty (Prolene Mesh) performed at 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    History of sinus surgery  multiple sinus surgeries    H/O total knee replacement, bilateral  5 years ago    History of partial hysterectomy  30 years ago - fibroids        FAMILY HISTORY:  Family history of diabetes mellitus type II    Family history of breast cancer (Sibling)    Family history of asthma        SOCIAL HISTORY:    Denies etoh / tob / illicit drugs     Allergies  ampicillin (Short breath)  aspirin (Short breath)  Avelox (Short breath; Pruritus)  codeine (Short breath)  Dilaudid (Short breath)  iodine (Short breath; Swelling)  penicillin (Short breath)  shellfish (Anaphylaxis)  tetanus toxoid (Short breath)  Valium (Short breath)    	    HOME MEDICATIONS:  Diltiazem 30 mg BID  Eliquis 5 mg BID  Crestor 5 mg daily        REVIEW OF SYSTEMS:  CONSTITUTIONAL: No fever. + fatigue  EYES: No eye pain, visual disturbances, or discharge  ENMT:  No difficulty hearing, tinnitus, vertigo; No sinus or throat pain  NECK: No pain or stiffness  BREASTS: No pain, masses, or nipple discharge  RESPIRATORY: No cough, wheezing, chills or hemoptysis; No Shortness of Breath at rest   CARDIOVASCULAR: See HPI.   GASTROINTESTINAL: No abdominal or epigastric pain. No nausea, vomiting, or hematemesis; No diarrhea or constipation. No melena or hematochezia.  GENITOURINARY: No dysuria, frequency, hematuria, or incontinence  NEUROLOGICAL: No headaches, memory loss, loss of strength, numbness, or tremors  SKIN: No itching, burning, rashes, or lesions   LYMPH Nodes: No enlarged glands  ENDOCRINE: No heat or cold intolerance; No hair loss  MUSCULOSKELETAL: No joint pain or swelling; No muscle, back, or extremity pain  PSYCHIATRIC: No depression, anxiety, mood swings, or difficulty sleeping  HEME/LYMPH: No easy bruising, or bleeding gums  ALLERY AND IMMUNOLOGIC: No hives or eczema	      PHYSICAL EXAM:  Vital Signs Last 24 Hrs  T(F): 97  HR: 41bpm   BP: 140/67  RR: 18  SpO2: 98% room air      Constitutional: NAD	  HEENT:   Normal oral mucosa	  CVS: Normal S1 / S2, bradycardia. + murmur  Pulm: clear. no wheeze.   GI:  + BS, soft, NT / ND   Ext: No LE edema  Vascular: Peripheral pulses palpable 2+ bilaterally  MS: full range of motion in all joints  Neurologic: A&O x 3, Non-focal  Psych: Pleasant, has good insight      EKG: NSR with ventricular bigeminy VR 41 bpm.  Normal  ms.  QRS 98 ms.  PVC with rbbb morphology and inferior axis (could be from LVOT origin).   Telemetry: (in Endoscopy) --- sinus with bigeminy. HR 90s.      Echo Complete w/o Contrast w/ Doppler (01.04.21 @ 15:47) >   1. The aortic valve is tricuspid and mildly thickned. No hemodynamically significant aortic stenosis. There is moderate-to-severe aortic regurgitation.   2. No other significant valvular disease.   3. The right ventricle is normal in size. Right ventricular systolic function is normal.   4. There is mild concentric left ventricular hypertrophy. There are no regional wall motion abnormalities seen. Left ventricular systolic function is hyperdynamic with a calculated ejection fraction of >75%.   5. The aortic root is dilated measuring 4.00 cm.   6. No pericardial effusion.      Stress Echo Exercise (w/o rest TTE) (01.12.21 @ 14:13) >  Overall left ventricular contractility increased with exercise. Segmental wall motion abnormalities were not seen. With stress, normal augmentation of all left ventricular wall segments noted, end systolic volume decreased and the ejection fraction increased. With stress, the left ventricular ejection fraction was >75%. The echocardiogram is non-diagnostic due to inadequate heart rate-systolic blood pressure product achieved however, no ischemic changes seen at 83 % of maximum heart rate achieved.

## 2021-03-01 ENCOUNTER — NON-APPOINTMENT (OUTPATIENT)
Age: 71
End: 2021-03-01

## 2021-03-02 ENCOUNTER — NON-APPOINTMENT (OUTPATIENT)
Age: 71
End: 2021-03-02

## 2021-03-04 ENCOUNTER — APPOINTMENT (OUTPATIENT)
Dept: HEART AND VASCULAR | Facility: CLINIC | Age: 71
End: 2021-03-04

## 2021-03-07 ENCOUNTER — OUTPATIENT (OUTPATIENT)
Dept: OUTPATIENT SERVICES | Facility: HOSPITAL | Age: 71
LOS: 1 days | Discharge: HOME | End: 2021-03-07

## 2021-03-07 DIAGNOSIS — Z96.653 PRESENCE OF ARTIFICIAL KNEE JOINT, BILATERAL: Chronic | ICD-10-CM

## 2021-03-07 DIAGNOSIS — Z98.89 OTHER SPECIFIED POSTPROCEDURAL STATES: Chronic | ICD-10-CM

## 2021-03-07 DIAGNOSIS — K62.5 HEMORRHAGE OF ANUS AND RECTUM: Chronic | ICD-10-CM

## 2021-03-07 DIAGNOSIS — H05.352 EXOSTOSIS OF LEFT ORBIT: Chronic | ICD-10-CM

## 2021-03-07 DIAGNOSIS — Z98.890 OTHER SPECIFIED POSTPROCEDURAL STATES: Chronic | ICD-10-CM

## 2021-03-07 DIAGNOSIS — Z87.09 PERSONAL HISTORY OF OTHER DISEASES OF THE RESPIRATORY SYSTEM: Chronic | ICD-10-CM

## 2021-03-07 DIAGNOSIS — C18.9 MALIGNANT NEOPLASM OF COLON, UNSPECIFIED: ICD-10-CM

## 2021-03-09 ENCOUNTER — LABORATORY RESULT (OUTPATIENT)
Age: 71
End: 2021-03-09

## 2021-03-10 ENCOUNTER — APPOINTMENT (OUTPATIENT)
Dept: PULMONOLOGY | Facility: CLINIC | Age: 71
End: 2021-03-10
Payer: MEDICARE

## 2021-03-10 VITALS
OXYGEN SATURATION: 98 % | SYSTOLIC BLOOD PRESSURE: 154 MMHG | TEMPERATURE: 96.9 F | HEART RATE: 71 BPM | BODY MASS INDEX: 21.97 KG/M2 | HEIGHT: 67 IN | WEIGHT: 140 LBS | DIASTOLIC BLOOD PRESSURE: 76 MMHG | RESPIRATION RATE: 16 BRPM

## 2021-03-10 PROCEDURE — 99214 OFFICE O/P EST MOD 30 MIN: CPT

## 2021-03-10 NOTE — REVIEW OF SYSTEMS
[Negative] : Endocrine [Dyspnea] : dyspnea [SOB on Exertion] : sob on exertion [Cough] : no cough [Sputum] : no sputum [Chest Discomfort] : no chest discomfort [GERD] : no gerd [Diarrhea] : no diarrhea [Constipation] : no constipation [Dysphagia] : no dysphagia [Dizziness] : no dizziness

## 2021-03-10 NOTE — ADDENDUM
[FreeTextEntry1] : Documented by Dejuan Cope acting as a scribe for Dr. Eulalio Allison on 03/10/2021.\par \par All medical record entries made by the Scribe were at my, Dr. Eulalio Allison's, direction and personally dictated by me on 03/10/2021. I have reviewed the chart and agree that the record accurately reflects my personal performance of the history, physical exam, assessment and plan. I have also personally directed, reviewed, and agree with the discharge instructions.

## 2021-03-10 NOTE — HISTORY OF PRESENT ILLNESS
[FreeTextEntry1] : Ms. Bloom is a 70 year old female with a history of abnormal CXR/chest CT, allergic rhinitis, severe persistent asthma, bronchiectasis, GERD, COPD, recurrent PNA, PND, s/p tracheoplasty, TBM, and SOB presenting to the office today for a follow up visit. Her chief complaint is SOB\par -she reports she is SOB and is unsure why. She had a cardiac workup\par -she notes she had a laser bronchoscopy (for TBM). Before the treatments, they found she had extra heartbeats on EKG, bradycardia - she had her on a 10 day monitor for the procedure (Seeing Carlos for cardiac)\par -she states she has some chest pressure\par -she states she has a small wound on the RLE, and took a culture of it, revealing no infection. She is using bacitracin on it\par -she notes she is not feeling well, and is spending more time in bed than she would like\par -she reports her blood sugar is stable\par -she reports she is not coughing much\par -she notes her mucus production is at a minimum\par -she reports she has not gotten the vaccine yet, as she has a constant low grade fever\par -she is currently on Medrol\par -the albuterol doesn’t help her. She is using Perforomist and Budesonide\par -she is only using a Spiriva inhaler\par -she denies any chest pain, chest pressure, diarrhea, constipation, dysphagia, dizziness, sour taste in the mouth, leg swelling, leg pain, itchy eyes, itchy ears, heartburn, reflux, myalgias or arthralgias.

## 2021-03-10 NOTE — ASSESSMENT
[FreeTextEntry1] : Ms. Bloom is a 70 year old female with a history of mm, rectal CA, AVDz, asthma, allergy, GERD, TBM, s/p multiple pneumonias, who presents with intermittent SOB. (But "this is not her asthma"). \par \par Her chronic SOB which is multifactorial due to:\par - tracheomalacia (s/p tracheoplasty with residual frequent mucus production, though patient is non-compliant with vest therapy)\par - chronic bronchitis\par - asthma\par - allergic rhinitis\par - GERD\par - poor breathing mechanics \par - CAD/AV disease\par \par \par problem 1a: severe persistent asthma -(steroid dependent) - Stable\par -continue Medrol 4 mg qd\par -continue to use albuterol via the nebulizer QID \par -followed by Mucomyst QiD\par -followed by the acapella device/ chest vest therapy \par -(1st) followed by Perforomist via the nebulizer BID\par -(2nd) followed by Budesonide 0.5% via the nebulizer BID \par -continue to use Breo Ellipta 200 at 1 inhalation QD \par -continue to use Spiriva 1 inhalation QD\par -failed Xolair 225 injection; follow up injections every 2 weeks - Dupixent initiated (12.3.19) - to continue 300 every 2 weeks. \par -continue to use Accolate 20 mg BID\par -Add Daliresp 250 mg QD \par -Information sheet given about prednisone to the patient to be reviewed, this medication is never to be used without consulting the prescribing physician. Proper dietary restraint is necessary specifically salt containing foods, if any reaction may occur should be reported. \par -Asthma is believed to be caused by inherited (genetic) and environmental factor, but its exact cause is unknown. Asthma may be triggered by allergens, lung infections, or irritants in the air. Asthma triggers are different for each person\par -Inhaler technique reviewed as well as oral hygiene techniques reviewed with patient. Avoidance of cold air, extremes of temperature, rescue inhaler should be used before exercise. Order of medication reviewed with patient. Recommended use of a cool mist humidifier in the bedroom. \par \par problem 2: tracheomalacia, residual bronchomalacia \par -s/p tracheoplasty with Dr. Mt Zapien\par -laser Bronchoscopy pending\par -continue to follow up \par -s/p f/u Dynamic chest CT 10/2019 positive w TBM - repeat \par \par problem 3: chronic bronchitis and mucus clearance\par -continue to use acapella device multiple times daily\par -recommended to use chest vest therapy multiple times daily \par -she is being sent for sputum cultures \par Patient has a chronic cough greater than 6 months, tried and failed manual chest physiotherapy at home, no skilled caregiver available at home to perform manual CPT, tried and failed acapella vibratory physiotherapy, and recommended chest vest therapy \par \par Problem 3A: Multiple infections\par -off Tobramycin BID for 1 month (completed 12/20/19)\par -Complete follow up Sputum culture after completing ABx if needed. \par \par Problem 3B: multi myeloma\par -appointment  on 1/28/2020\par \par problem 4: GERD\par -continue to use Protonix 40 mg before breakfast\par -continue Baclofen 10 mg q-meal\par -Rule of 2s: avoid eating too much, eating too late, eating too spicy, eating two hours before bed\par -Things to avoid including overeating, spicy foods, tight clothing, eating within three hours of bed, this list is not all inclusive. \par -For treatment of reflux, possible options discussed including diet control, H2 blockers, PPIs, as well as coating motility agents discussed as treatment options. Timing of meals and proximity of last meal to sleep were discussed. If symptoms persist, a formal gastrointestinal evaluation is needed. \par \par problem 5: allergic rhinitis \par -continue to use nasal saline\par -continue to use Xyzal 5 mg before bed\par -Environmental measures for allergies were encouraged including mattress and pillow cover, air purifier, and environmental controls. \par \par problem 6: hx of abnormal aortic valve / cardiac health\par -continue to follow up with Dr. Leahy, AV Disease, AF\par -echocardiogram in June 2018  ([facility]); Kale Tristan for f/u, Carlos\par \par problem 7: colon cancer\par -s/p radiation therapy, surgery \par -continue to use chemotherapy follow up with Dr. King or Dr. Mario\par \par problem 8: poor breathing mechanics\par -Proper breathing techniques were reviewed with an emphasis of exhalation. Patient instructed to breath in for 1 second and out for four seconds. Patient was encouraged to not talk while walking.\par \par problem 9: immunodeficiency\par - Due to the fact that this pt has had more infections than would be expected and immunological blood work is indicated this would include: IgG subclasses, quantitative immunoglobulins, Strep pneumoniae titers as well as Vitamin D levels. Based on this blood work we will be able to decide where the pt needs additional pneumococcal vaccine either Prevnar 13 or pneumovax. Immunology evaluation will also be potentially indicated.\par \par problem 10: r/o immunodeficiency (on Medrol)\par -Due to the fact that this pt has had more infections than would be expected and immunological blood work is indicated this would include: IgG subclasses, quantitative immunoglobulins, Strep pneumoniae titers as well as Vitamin D levels.\par -Based on this blood work we will be able to decide where the pt needs additional pneumococcal vaccine either Prevnar 13 or pneumovax. Immunology evaluation will also be potentially indicated. \par \par problem 11: abnormal chest CT- ? new nodule- likely inflammation \par - F/u PET/CT 4/2019- if changed Bx (Rupali); follow up and re-evaluate as per Rupali\par -questionable DIEUDONNE or HERMELINDO, all sputum is negative \par -CAT scans are the only radiological modality to identify abnormalities w/in the lungs with regards to nodules/masses/lymph nodes. Risks, benefits were reviewed in detail. The guidelines for abnormalities include follow up CT scans at various intervals which could range from 6 weeks to 1 year intervals. If there is a change for the worse then consideration for a biopsy will be considered if you are a candidate. Second opinion evaluation with a thoracic surgeon or an interventional radiologist could be offered. 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 It sometimes develops following a URI, herpes zoster outbreak in pharynx or thyroid or cervical spine injury. However, many patients have no identifiable antecedent explanation. \par \par Problem 14: Health Maintenance/COVID19 Precautions \par - Clean your hands often. Wash your hands often with soap and water for at least 20 seconds, especially after blowing your nose, coughing, or sneezing, or having been in a public place.\par - If soap and water are not available, use a hand  that contains at least 60% alcohol.\par - To the extent possible, avoid touching high-touch surfaces in public places - elevator buttons, door handles, handrails, handshaking with people, etc. Use a tissue or your sleeve to cover your hand or finger if you must touch something.\par - Wash your hands after touching surfaces in public places.\par - Avoid touching your face, nose, eyes, etc.\par - Clean and disinfect your home to remove germs: practice routine cleaning of frequently touched surfaces (for example: tables, doorknobs, light switches, handles, desks, toilets, faucets, sinks & cell phones)\par - Avoid crowds, especially in poorly ventilated spaces. Your risk of exposure to respiratory viruses like COVID-19 may increase in crowded, closed-in settings with little air circulation if there are people in the crowd who are sick. All patients are recommended to practice social distancing and stay at least 6 feet away from others. \par - Avoid all non-essential travel including plane trips, and especially avoid embarking on cruise ships.\par -If COVID-19 is spreading in your community, take extra measures to put distance between yourself and other people to further reduce your risk of being exposed to this new virus.\par -Stay home as much as possible.\par - Consider ways of getting food brought to your house through family, social, or commercial networks\par -Be aware that the virus has been known to live in the air up to 3 hours post exposure, cardboard up to 24 hours post exposure, copper up to 4 hours post exposure, steel and plastic up to 2-3 days post exposure. Risk of transmission from these surfaces are affected by many variables.\par COVID-19 precautionary Immune Support Recommendations:\par -OTC Vitamin C 500mg BID \par -OTC Quercetin 250-500mg BID \par -OTC Zinc 75-100mg per day \par -OTC Melatonin 1 or 2mg a night \par -OTC Vitamin D 1-4000mg per day \par -OTC Tonic Water 8oz per day\par -Water 0.5-1 gallon per day\par Asthma and COVID19:\par You need to make sure your asthma is under control. This often requires the use of inhaled corticosteroids (and sometimes oral corticosteroids). Inhaled corticosteroids do not likely reduce your immune system’s ability to fight infections, but oral corticosteroids may. It is important to use the steps above to protect yourself to limit your exposure to any respiratory virus. \par \par problem 15:  health maintenance \par -s/p flu shot 10/30/2020 - flu shot given in office. \par -recommended strep pneumonia vaccines: Prevnar-13 vaccine, followed by Pneumo vaccine 23 one year following\par -recommended early intervention for URIs\par -recommended regular osteoporosis evaluations\par -recommended early dermatological evaluations\par -recommended after the age of 50 to the age of 70, colonoscopy every 5 years \par \par F/U in 6 weeks - SPI / NIOX\par She is encouraged to call with any changes, concerns, or questions.

## 2021-03-10 NOTE — PHYSICAL EXAM

## 2021-03-11 ENCOUNTER — APPOINTMENT (OUTPATIENT)
Dept: HEMATOLOGY ONCOLOGY | Facility: CLINIC | Age: 71
End: 2021-03-11
Payer: MEDICARE

## 2021-03-11 PROCEDURE — 99443: CPT | Mod: 95

## 2021-03-12 ENCOUNTER — RX RENEWAL (OUTPATIENT)
Age: 71
End: 2021-03-12

## 2021-03-12 NOTE — ASSESSMENT
[FreeTextEntry1] : 70 year-old Ms. RODRIGUEZ is seen in follow up for MGUS. She has stable disease.  \par \par \par # IgA kappa MGUS, low-int risk disease\par - Stable disease.\par - Monitor paraprotein q 3 months (next labs for June ordered)\par - Report any new symptoms. \par - RTC in 3 months

## 2021-03-12 NOTE — HISTORY OF PRESENT ILLNESS
[Disease:__________________________] : Disease: [unfilled] [Home] : at home, [unfilled] , at the time of the visit. [Medical Office: ([facility])___] : at the medical office located in  [Verbal consent obtained from patient] : the patient, [unfilled] [de-identified] : CHART REVIEW: \par Jan 2020\par 70 yo female with a hx of GI malignancy - CRC dx in 2017 s/p chemo/RT, has chronic ostomy, diabetes, afib, tracheobronchomalacia, adrenal insufficiency on chronic steroids, severe Asthma, hx of recurrent infections. Pt recently seen by Immunologist Dr Carmichael and informed of possibl ediagnosis of multiple myeloma due to e/o elevated IgA level on labs and IgA kappa paraprotein on serum immunofixation. Pt has no SLiM- CRAB synptoms or any e/o myeloma defining events on labs at this time. She reports recently told she has pseudomonas infection being followed closely by ID. She also has chronic neuropathy in legs unclear is symptoms due to diabetic neuropathy vs lumbosacral spinal stenosis. Her last CT imaging in Oct 2019 showed no lytic lesions in the bones. \par She is here for initial evaluation to assess for possible plasma cell dyscrasia. On lab review M spike was 0.1g in Jan 2, 2020, IgA level was 558 with normal SFLC levels and nl SFLC ratio. She is currently on ciprofloxacin for +pseudomonas in sputum cx. She is in usual state of chronically ill health, overall with good disposition.  [de-identified] : low-int risk [de-identified] : Pt with PMH tracheobronchomalacia s/p tracheobronchoplasty, lung nodules, severe allergic asthma (on methylprednisolone 4 mg QD and dupilumuab), adrenal insufficiency, bronchiectasis, DM II, HTN, mod-severe AI (by ECHO 9/2020 and follows with Dr John) PAF (Eliquis and digoxin), who presented to the ED at [facility] on 1/2/21 with complaints of palpitations and chest pain. Found to be in AF with RVR by EMS on arrival to site. Patient converted to SR prior to arrival with resolution of symptoms. Patient admitted to tele for further management. Trops negative x2 and patient CP free throughout hospital course. Pt remained in NSR, EP consulted and recommended continuing home Digoxin 0.25 mg QD and Eliquis 5 mg BID. No plan for ablation 2/2 pulmonary comorbidities and would require intubation. Pulm consulted and patient is at baseline pulmonary function and recommending continuing all her home meds. Cardiac MRI done 1/4, to follow up with cardiology (Dr John), and will have further AI workup as outpatient including CPET with ECHO on 1/12/21. Pt [facility] from [facility] on 1/6/[facility].\par Has persistent neuropathy in hands and feet-was prescribed Gabapentin but doesn't take it regularly.  Has persistent dyspnea which is at baseline.  Denies fever, chills, night sweats, cough, abd pain, N/V.   \par -----------------------------------------------------\par \par 3/11/2021\par Patient seen via telehealth. She endorses no change in her health history. She has done the myeloma labs a day or two ago and is eager to know the results. She has no complaints.

## 2021-03-12 NOTE — RESULTS/DATA
[FreeTextEntry1] : 3/11/2021\par Labs from 3/9/2021 reviewed with patient. \par Stable M-Lex and FLCR (mild increase noted)\par --------------------------------------------------------\par \par EXAM: CT ABDOMEN AND PELVIS OC IC \par PROCEDURE DATE: 10/22/2019\par FINDINGS: \par LOWER CHEST: A 1 cm nodule in the right lower lobe (2:63) is not significantly changed from p\par chest CT 8/13/2019. For full evaluation of the chest, correlate with dedicated CT chest perfor\par same day. \par LIVER: Within normal limits. \par BILE DUCTS: Normal caliber. \par GALLBLADDER: Within normal limits. \par SPLEEN: Within normal limits. \par PANCREAS: Two pancreatic tail cyst measuring 2.1 cm and 1.4 cm and without significant tyshawn\par Main pancreatic duct is not dilated. \par ADRENALS: Within normal limits. \par KIDNEYS/URETERS: Left renal cysts.\par BLADDER: Within normal limits. \par REPRODUCTIVE ORGANS: Hysterectomy. \par BOWEL: Status post abdominal peroneal resection with a left lower quadrant colostomy. No bow\par obstruction. Appendix is normal. \par PERITONEUM: No ascites. \par VESSELS: Atherosclerotic changes. \par RETROPERITONEUM/LYMPH NODES: No lymphadenopathy. \par ABDOMINAL WALL: Within normal limits. \par BONES: Status post plate and screw fixation of the pubic symphysis as well as fixation of t\par sacrum. \par IMPRESSION: \par No evidence of metastatic disease in the abdomen/pelvis. \par Pancreatic tail cysts measuring up to 2.1 cm without significant change. \par For evaluation of the chest, please refer to dedicated chest CT report same day.

## 2021-03-16 ENCOUNTER — APPOINTMENT (OUTPATIENT)
Dept: DERMATOLOGY | Facility: CLINIC | Age: 71
End: 2021-03-16
Payer: MEDICARE

## 2021-03-16 DIAGNOSIS — L21.9 SEBORRHEIC DERMATITIS, UNSPECIFIED: ICD-10-CM

## 2021-03-16 DIAGNOSIS — L82.1 OTHER SEBORRHEIC KERATOSIS: ICD-10-CM

## 2021-03-16 PROCEDURE — 99214 OFFICE O/P EST MOD 30 MIN: CPT | Mod: 25

## 2021-03-16 PROCEDURE — 17110 DESTRUCTION B9 LES UP TO 14: CPT

## 2021-03-23 ENCOUNTER — NON-APPOINTMENT (OUTPATIENT)
Age: 71
End: 2021-03-23

## 2021-04-07 ENCOUNTER — APPOINTMENT (OUTPATIENT)
Dept: PULMONOLOGY | Facility: CLINIC | Age: 71
End: 2021-04-07
Payer: MEDICARE

## 2021-04-07 PROCEDURE — 94010 BREATHING CAPACITY TEST: CPT

## 2021-04-07 PROCEDURE — 94729 DIFFUSING CAPACITY: CPT

## 2021-04-07 PROCEDURE — 94726 PLETHYSMOGRAPHY LUNG VOLUMES: CPT

## 2021-04-07 PROCEDURE — 94618 PULMONARY STRESS TESTING: CPT

## 2021-04-19 ENCOUNTER — RX RENEWAL (OUTPATIENT)
Age: 71
End: 2021-04-19

## 2021-04-20 ENCOUNTER — INPATIENT (INPATIENT)
Facility: HOSPITAL | Age: 71
LOS: 8 days | Discharge: HOME | End: 2021-04-29
Attending: INTERNAL MEDICINE | Admitting: INTERNAL MEDICINE
Payer: MEDICARE

## 2021-04-20 ENCOUNTER — APPOINTMENT (OUTPATIENT)
Dept: PULMONOLOGY | Facility: CLINIC | Age: 71
End: 2021-04-20

## 2021-04-20 VITALS
DIASTOLIC BLOOD PRESSURE: 67 MMHG | TEMPERATURE: 98 F | RESPIRATION RATE: 26 BRPM | HEIGHT: 67 IN | OXYGEN SATURATION: 100 % | HEART RATE: 75 BPM | SYSTOLIC BLOOD PRESSURE: 145 MMHG

## 2021-04-20 DIAGNOSIS — Z87.09 PERSONAL HISTORY OF OTHER DISEASES OF THE RESPIRATORY SYSTEM: Chronic | ICD-10-CM

## 2021-04-20 DIAGNOSIS — Z98.89 OTHER SPECIFIED POSTPROCEDURAL STATES: Chronic | ICD-10-CM

## 2021-04-20 DIAGNOSIS — Z98.890 OTHER SPECIFIED POSTPROCEDURAL STATES: Chronic | ICD-10-CM

## 2021-04-20 DIAGNOSIS — Z96.653 PRESENCE OF ARTIFICIAL KNEE JOINT, BILATERAL: Chronic | ICD-10-CM

## 2021-04-20 DIAGNOSIS — K62.5 HEMORRHAGE OF ANUS AND RECTUM: Chronic | ICD-10-CM

## 2021-04-20 DIAGNOSIS — R42 DIZZINESS AND GIDDINESS: ICD-10-CM

## 2021-04-20 DIAGNOSIS — H05.352 EXOSTOSIS OF LEFT ORBIT: Chronic | ICD-10-CM

## 2021-04-20 LAB
ALBUMIN SERPL ELPH-MCNC: 3.7 G/DL — SIGNIFICANT CHANGE UP (ref 3.3–5)
ALP SERPL-CCNC: 81 U/L — SIGNIFICANT CHANGE UP (ref 40–120)
ALT FLD-CCNC: 15 U/L — SIGNIFICANT CHANGE UP (ref 4–33)
ANION GAP SERPL CALC-SCNC: 9 MMOL/L — SIGNIFICANT CHANGE UP (ref 7–14)
AST SERPL-CCNC: 17 U/L — SIGNIFICANT CHANGE UP (ref 4–32)
BASOPHILS # BLD AUTO: 0.02 K/UL — SIGNIFICANT CHANGE UP (ref 0–0.2)
BASOPHILS NFR BLD AUTO: 0.2 % — SIGNIFICANT CHANGE UP (ref 0–2)
BILIRUB SERPL-MCNC: <0.2 MG/DL — SIGNIFICANT CHANGE UP (ref 0.2–1.2)
BLOOD GAS VENOUS COMPREHENSIVE RESULT: SIGNIFICANT CHANGE UP
BUN SERPL-MCNC: 19 MG/DL — SIGNIFICANT CHANGE UP (ref 7–23)
CALCIUM SERPL-MCNC: 9 MG/DL — SIGNIFICANT CHANGE UP (ref 8.4–10.5)
CHLORIDE SERPL-SCNC: 105 MMOL/L — SIGNIFICANT CHANGE UP (ref 98–107)
CO2 SERPL-SCNC: 27 MMOL/L — SIGNIFICANT CHANGE UP (ref 22–31)
CREAT SERPL-MCNC: 0.68 MG/DL — SIGNIFICANT CHANGE UP (ref 0.5–1.3)
EOSINOPHIL # BLD AUTO: 0.02 K/UL — SIGNIFICANT CHANGE UP (ref 0–0.5)
EOSINOPHIL NFR BLD AUTO: 0.2 % — SIGNIFICANT CHANGE UP (ref 0–6)
GLUCOSE SERPL-MCNC: 139 MG/DL — HIGH (ref 70–99)
HCT VFR BLD CALC: 37.6 % — SIGNIFICANT CHANGE UP (ref 34.5–45)
HGB BLD-MCNC: 11.4 G/DL — LOW (ref 11.5–15.5)
IANC: 6.7 K/UL — SIGNIFICANT CHANGE UP (ref 1.5–8.5)
IMM GRANULOCYTES NFR BLD AUTO: 0.4 % — SIGNIFICANT CHANGE UP (ref 0–1.5)
LYMPHOCYTES # BLD AUTO: 0.8 K/UL — LOW (ref 1–3.3)
LYMPHOCYTES # BLD AUTO: 10 % — LOW (ref 13–44)
MAGNESIUM SERPL-MCNC: 2.1 MG/DL — SIGNIFICANT CHANGE UP (ref 1.6–2.6)
MCHC RBC-ENTMCNC: 22.8 PG — LOW (ref 27–34)
MCHC RBC-ENTMCNC: 30.3 GM/DL — LOW (ref 32–36)
MCV RBC AUTO: 75 FL — LOW (ref 80–100)
MONOCYTES # BLD AUTO: 0.44 K/UL — SIGNIFICANT CHANGE UP (ref 0–0.9)
MONOCYTES NFR BLD AUTO: 5.5 % — SIGNIFICANT CHANGE UP (ref 2–14)
NEUTROPHILS # BLD AUTO: 6.7 K/UL — SIGNIFICANT CHANGE UP (ref 1.8–7.4)
NEUTROPHILS NFR BLD AUTO: 83.7 % — HIGH (ref 43–77)
NRBC # BLD: 0 /100 WBCS — SIGNIFICANT CHANGE UP
NRBC # FLD: 0 K/UL — SIGNIFICANT CHANGE UP
NT-PROBNP SERPL-SCNC: 340 PG/ML — HIGH
PLATELET # BLD AUTO: 268 K/UL — SIGNIFICANT CHANGE UP (ref 150–400)
POTASSIUM SERPL-MCNC: 4.3 MMOL/L — SIGNIFICANT CHANGE UP (ref 3.5–5.3)
POTASSIUM SERPL-SCNC: 4.3 MMOL/L — SIGNIFICANT CHANGE UP (ref 3.5–5.3)
PROT SERPL-MCNC: 6.7 G/DL — SIGNIFICANT CHANGE UP (ref 6–8.3)
RBC # BLD: 5.01 M/UL — SIGNIFICANT CHANGE UP (ref 3.8–5.2)
RBC # FLD: 17.2 % — HIGH (ref 10.3–14.5)
SARS-COV-2 RNA SPEC QL NAA+PROBE: SIGNIFICANT CHANGE UP
SODIUM SERPL-SCNC: 141 MMOL/L — SIGNIFICANT CHANGE UP (ref 135–145)
TROPONIN T, HIGH SENSITIVITY RESULT: 23 NG/L — SIGNIFICANT CHANGE UP
WBC # BLD: 8.01 K/UL — SIGNIFICANT CHANGE UP (ref 3.8–10.5)
WBC # FLD AUTO: 8.01 K/UL — SIGNIFICANT CHANGE UP (ref 3.8–10.5)

## 2021-04-20 PROCEDURE — 99223 1ST HOSP IP/OBS HIGH 75: CPT | Mod: GC

## 2021-04-20 PROCEDURE — 99285 EMERGENCY DEPT VISIT HI MDM: CPT | Mod: CS,25

## 2021-04-20 PROCEDURE — 71045 X-RAY EXAM CHEST 1 VIEW: CPT | Mod: 26

## 2021-04-20 PROCEDURE — 93010 ELECTROCARDIOGRAM REPORT: CPT

## 2021-04-20 RX ORDER — ALBUTEROL 90 UG/1
2 AEROSOL, METERED ORAL ONCE
Refills: 0 | Status: COMPLETED | OUTPATIENT
Start: 2021-04-20 | End: 2021-04-20

## 2021-04-20 RX ADMIN — ALBUTEROL 2 PUFF(S): 90 AEROSOL, METERED ORAL at 16:45

## 2021-04-20 NOTE — H&P ADULT - NSICDXPASTSURGICALHX_GEN_ALL_CORE_FT
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    H/O pelvic surgery 5 years ago - s/p fracture    H/O total knee replacement, bilateral 5 years ago    History of partial hysterectomy 30 years ago - fibroids    History of sinus surgery multiple sinus surgeries    History of tracheomalacia 2015 - attempted tracheal stenting ([facility])- course complicated by obstruction, respiratory failure, multiple CPR attempts -  stent discontinued; 10/20/2016 Tracheobronchoplasty (Prolene Mesh) performed at [facility] by Dr Zapien    Rectal bleeding exam under anesthesia ([facility]) 2/2018    S/P bronchoscopy 6/5/2018 - [facility] (Dr Zapien) no evidence of tracheobronchomalacia in trachea or bronchial tubes

## 2021-04-20 NOTE — ED PROVIDER NOTE - PSH
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  H/O pelvic surgery  5 years ago - s/p fracture  H/O total knee replacement, bilateral  5 years ago  History of partial hysterectomy  30 years ago - fibroids  History of sinus surgery  multiple sinus surgeries  History of tracheomalacia  2015 - attempted tracheal stenting ([facility])- course complicated by obstruction, respiratory failure, multiple CPR attempts -  stent discontinued; 10/20/2016 Tracheobronchoplasty (Prolene Mesh) performed at [facility] by Dr Zapien  Rectal bleeding  exam under anesthesia ([facility]) 2/2018  S/P bronchoscopy  6/5/2018 - [facility] (Dr Zapien) no evidence of tracheobronchomalacia in trachea or bronchial tubes

## 2021-04-20 NOTE — H&P ADULT - PROBLEM SELECTOR PLAN 1
Patient with long standing sensation of palpitations with increasing frequency over past 2 days.  - associated with lightheadedness but no chest pain  - EKG showing sinus rhythm with PVCs, no ischemic changes  - troponins indeterminate, will trend to peak  - remains in NSR with PVCs on telemetry, continue tele monitoring  - previous TTE showing mod-severe AR, will obtain new TTE Patient with long standing sensation of palpitations with increasing frequency over past 2 days.  - associated with lightheadedness and dyspnea but no chest pain  - EKG showing sinus rhythm with PVCs, no ischemic changes  - troponins indeterminate, will trend to peak  - remains in NSR with PVCs on telemetry, continue tele monitoring  - previous TTE showing mod-severe AR, will obtain new TTE

## 2021-04-20 NOTE — H&P ADULT - PROBLEM SELECTOR PLAN 2
patient with COPD on Spiriva, daliresp and breo ellipta at home  - following with Dr. Allison outpatient  - c/w tiotropium daily  - c/w daliresp (roflumilast)  - albuterol PRN patient with COPD on Spiriva, daliresp and breo ellipta at home  - following with Dr. Allison outpatient  - c/w tiotropium daily  - c/w daliresp (roflumilast)  - albuterol PRN  - on medrol 4mg daily for adrenal insuff. already, will increase steroid dose if wheezing not improved

## 2021-04-20 NOTE — ED PROVIDER NOTE - ATTENDING CONTRIBUTION TO CARE
Patient is a 69 yo F with history of DM, HTN, tracheobronchomalacia s/p tracheobronchoplasty, lung nodules, severe allergic asthma, adrenal insufficiency, bronchiectasis, colorectal cancer s/p colostomy, mod-severe AI and PAF (on Eliquis) here for evaluation of bradycardia, shortness of breath, dyspnea on exertion and palpitations. Patient reports KRAMER, palpitations x 2 days. She reports she feels some chest pressure. At rehab, patient had episode of bradycardia to 40's today. Patient was previously on digoxin which was discontinued, now on diltiazem. No fevers, chills, nausea, vomiting. No diarrhea.     PCP: Dr. Rivera  Pulm: Dr. Allison    VS noted  Gen. no acute distress, Non toxic   HEENT: EOMI, mmm  Lungs: expiratory wheezing  CVS: RRR   Abd; Soft non tender, non distended   Ext: no edema  Skin: no rash  Neuro AAOx3 non focal clear speech  a/p: KRAMER/ palpitations - pt wheezing on exam. She is on daily steroids. Will give albuterol here. Bradycardia - pt's HR in 70's. EKG with SR. Concern for symptomatic bradycardia, worsening asthma, electrolyte abnormality. plan for labs, CXR, likely admission.   - Neeraj FARLEY

## 2021-04-20 NOTE — H&P ADULT - PROBLEM SELECTOR PLAN 4
-currently on lantus 20U qhs, Humalog sliding scale and victoza at home  - will hold victoza while inpatient  - c/w lantus 20U qhs and admelog sliding scale with meals  - FS TIDAC -currently on lantus 20U qhs, Humalog sliding scale and victoza at home  - will hold victoza while inpatient  - c/w lantus 15U qhs as patient reports reduced appetite recently and admelog sliding scale with meals. Can uptitrate if PO intake improves  - FS TIDAC

## 2021-04-20 NOTE — H&P ADULT - NSHPREVIEWOFSYSTEMS_GEN_ALL_CORE
REVIEW OF SYSTEMS:  CONSTITUTIONAL: No weakness, fevers or chills  EYES/ENT: No visual changes;  No vertigo or throat pain   NECK: No pain or stiffness  RESPIRATORY: No cough, wheezing, hemoptysis; +SOB, +KRAMER  CARDIOVASCULAR: No chest pain or palpitations  GASTROINTESTINAL: No abdominal or epigastric pain. No nausea, vomiting, or hematemesis; No diarrhea or constipation. No melena or hematochezia.  GENITOURINARY: No dysuria, frequency or hematuria  NEUROLOGICAL: No numbness or weakness  SKIN: No itching, rashes  PSYCHOLOGICAL: appropriate mood and affect  ENDOCRINE: no heat or cold intolerance REVIEW OF SYSTEMS:  CONSTITUTIONAL: No weakness, fevers or chills  EYES/ENT: No visual changes;  No vertigo or throat pain   NECK: No pain or stiffness  RESPIRATORY: No cough, wheezing, hemoptysis; +SOB, +KRAMER  CARDIOVASCULAR: No chest pain, +palpitations  GASTROINTESTINAL: No abdominal or epigastric pain. No nausea, vomiting, or hematemesis; No diarrhea or constipation. No melena or hematochezia.  GENITOURINARY: No dysuria, frequency or hematuria  NEUROLOGICAL: No numbness or weakness  SKIN: No itching, rashes  PSYCHOLOGICAL: appropriate mood and affect  ENDOCRINE: no heat or cold intolerance

## 2021-04-20 NOTE — H&P ADULT - PROBLEM SELECTOR PLAN 5
Patient currently on medrol 4mg daily for chronic adrenal insufficiency  - c/w methylprednisolone 4mg while inpatient

## 2021-04-20 NOTE — ED PROVIDER NOTE - NS ED ROS FT
Gen: Denies fever.   HEENT: Denies headache. Denies congestion.  CV: Denies chest pain. +lightheadedness.  Skin: Denies rash.   Resp: +SOB. Denies cough.  GI: Denies abd pain. Denies nausea. Denies vomiting. Denies diarrhea. Denies melena. Denies hematochezia.  Msk: Denies extremity swelling. Denies extremity pain.  : Denies dysuria. Denies hematuria.  Neuro: Denies LOC. Denies dizziness. Denies new numbness/tingling. Denies new focal weakness.  Psych: Denies SI

## 2021-04-20 NOTE — ED PROVIDER NOTE - CLINICAL SUMMARY MEDICAL DECISION MAKING FREE TEXT BOX
69 yo F hx of SHELLFISH/IODINE Allergy, ASA Allergy, PMH tracheobronchomalacia s/p trancehobronchoplasty, severe allergic asthma (on methylprednisolone 4 mg QD and dupilumuab), adrenal insufficiency, bronchiectasis, DM II, HTN, mod-severe AI, PAF (Eliquis and carvediolol), colo-rectal cancer, presenting from pulmonary rehab for increasing afib intermittently x 2 days. Associated with SOB/KRAMER, generalized weakness, lightheadedness x 2 days; sent to ED after episode of bradycardia to 40s today. Exam as above. Consider asthma exacerbation, symptomatic bradycardia, electrolyte abnormality, med side effect, acs, sick sinus. labs, imaging, breathing treatment, will reassess.

## 2021-04-20 NOTE — ED ADULT TRIAGE NOTE - CHIEF COMPLAINT QUOTE
was at pulmonologist office for rehab has hx of asthma has tracheal malasia and diaphragm difficulties  heart rate had dropped to 40 s in field  pt A&Ox4

## 2021-04-20 NOTE — ED PROVIDER NOTE - PROGRESS NOTE DETAILS
PGY 1 Terrell Mon: Work up thus far reviewed. Trop mildly elevated. S/p treatment w/ albuterol for wheezing. Given reported symptoms in setting of recent medication changes and bradycardia, plan for admission. Discussed case w/ hospitalist and pt accepted for admission.

## 2021-04-20 NOTE — H&P ADULT - NSHPPHYSICALEXAM_GEN_ALL_CORE
PHYSICAL EXAM:  GENERAL: NAD, lying in bed comfortably  HEENT: NC/AT, EOMI, PERRL  NECK: Supple, No JVD  CHEST/LUNG: CTAB, no increased WOB  HEART: RRR, no m/r/g  ABDOMEN: soft, NT, ND, BS+  EXTREMITIES:  2+ peripheral pulses, no clubbing, no edema  NERVOUS SYSTEM:  A&Ox3, no focal deficits  MSK: FROM all 4 extremities, full and equal strength  SKIN: No rashes or lesions PHYSICAL EXAM:  GENERAL: NAD, lying in bed comfortably  HEENT: NC/AT, EOMI, PERRL  NECK: Supple, No JVD  CHEST/LUNG: no increased WOB, b/l expiratory wheezes noted, right chest wall mediport present, dressing c/d/i  HEART: RRR, no m/r/g  ABDOMEN: soft, NT, ND, BS+, ostomy in place  EXTREMITIES:  2+ peripheral pulses, no clubbing, no edema, right lower leg wound, dry and healing  NERVOUS SYSTEM:  A&Ox3, no focal deficits  MSK: FROM all 4 extremities, full and equal strength  SKIN: No rashes or lesions PHYSICAL EXAM:  GENERAL: NAD, lying in bed comfortably  HEENT: NC/AT, EOMI, PERRL  NECK: Supple, No JVD  CHEST/LUNG: no increased WOB, b/l mild expiratory wheezes noted, right chest wall mediport present, dressing c/d/i  HEART: RRR, no m/r/g  ABDOMEN: soft, NT, ND, BS+, ostomy in place  EXTREMITIES:  2+ peripheral pulses, no clubbing, no edema, right lower leg wound, dry and healing  NERVOUS SYSTEM:  A&Ox3, no focal deficits  MSK: FROM all 4 extremities, full and equal strength  SKIN: No rashes or lesions

## 2021-04-20 NOTE — H&P ADULT - NSHPLABSRESULTS_GEN_ALL_CORE
LABS:                         11.4   8.01  )-----------( 268      ( 20 Apr 2021 16:55 )             37.6     04-20    141  |  105  |  19  ----------------------------<  139<H>  4.3   |  27  |  0.68    Ca    9.0      20 Apr 2021 16:55  Mg     2.1     04-20    TPro  6.7  /  Alb  3.7  /  TBili  <0.2  /  DBili  x   /  AST  17  /  ALT  15  /  AlkPhos  81  04-20    Serum Pro-Brain Natriuretic Peptide: 340 pg/mL (04-20 @ 16:55)    RADIOLOGY, EKG & ADDITIONAL TESTS:   < from: Xray Chest 1 View- PORTABLE-Urgent (04.20.21 @ 17:15) >    IMPRESSION:    Clear lungs.    < end of copied text >

## 2021-04-20 NOTE — ED ADULT NURSE NOTE - OBJECTIVE STATEMENT
break coverage RN - pt received in room 13 A&Ox4 c/o SOB, palpitations. pt was at pulmonologist office to begin pulmonary rehab today. States she was "not feeling well", c/o palpitations and SOB. Was found to be in A.Fib. pt is on Eliquis and Cardizem. Also states "heart rate when down to 40s.". Denies Chest pain, N/V/D, fevers, cough. Pt slightly tachypneic, placed on 2L O2 sat >98%. On cardiac monitor. Pending further orders at this time. Will continue to monitor.

## 2021-04-20 NOTE — H&P ADULT - ASSESSMENT
70 year old female with hx of tracheobronchomalacia s/p tracheobronchoplasty, lung nodules, severe allergic asthma, adrenal insufficiency, bronchiectasis, DM II, HTN, mod-severe AI and PAF (on Eliquis) now presenting with palpitations and dyspnea on exertion. 70 year old female with hx of tracheobronchomalacia s/p tracheobronchoplasty, lung nodules, severe allergic asthma, adrenal insufficiency, bronchiectasis, DM II, HTN, CRC s/p colostomy, mod-severe AI and PAF (on Eliquis) now presenting with palpitations and dyspnea on exertion.

## 2021-04-20 NOTE — H&P ADULT - PROBLEM SELECTOR PLAN 3
Patient with long standing history of atrial fibrillation  - was previously on digoxin but switched to cardizem recently  - in setting of reported bradycardic episode, will hold cardizem at this time  - patient currently in sinus 60-70s on telemetry

## 2021-04-20 NOTE — H&P ADULT - HISTORY OF PRESENT ILLNESS
70 year old female with hx of tracheobronchomalacia s/p tracheobronchoplasty, lung nodules, severe allergic asthma, adrenal insufficiency, bronchiectasis, DM II, HTN, mod-severe AI and PAF (on Eliquis) now presenting with dyspnea on exertion. Patient was at pulmonary rehab when she was noted to have worsening SOB and KRAMER for the past 2 days. She has also had generalized weakness and lightheadedness, with most of the symptoms resolving while at rest. Patient noted to have episode of bradycardia to the 40s at her rehab facility. Of note, patient was recently switched off of digoxin to carvedilol. She currently denies fever, chills, CP, n/v, abd pain, diarrhea, or dysuria.    In the ED: Patient received in the ED. Noted to have HR in the 70s. Given albuterol treatment x1.  70 year old female with hx of tracheobronchomalacia s/p tracheobronchoplasty, lung nodules, severe allergic asthma, adrenal insufficiency, bronchiectasis, DM II, HTN, mod-severe AI and PAF (on Eliquis) now presenting with palpitations and dyspnea on exertion. Patient states that she would occasionally have palpitations however over the past 2 days the frequency of palpitations has increased. She has also had generalized weakness and lightheadedness, with most of the symptoms resolving while at rest. She also occasionally endorses feeling some chest pressure when she has palpitations. Patient was also noted to have episode of bradycardia to the 40s at her rehab facility. Of note, patient was recently switched off of digoxin to carvedilol. She currently denies fever, chills, CP, n/v, abd pain, diarrhea, or dysuria.    In the ED: Patient received in the ED. Noted to have HR in the 70s. Given albuterol treatment x1.  70 year old female with hx of tracheobronchomalacia s/p tracheobronchoplasty, lung nodules, severe allergic asthma, adrenal insufficiency, bronchiectasis, DM II, HTN, mod-severe AI and PAF (on Eliquis) now presenting with palpitations and dyspnea on exertion. Patient states that she would occasionally have palpitations however over the past 2 days the frequency of palpitations has increased. She has also had generalized weakness and lightheadedness, with most of the symptoms resolving while at rest. She also occasionally endorses feeling some chest pressure when she has palpitations. Patient was also noted to have episode of bradycardia to the 40s at her rehab facility. Of note, patient was recently switched off of digoxin to diltiazem. She currently denies fever, chills, CP, n/v, abd pain, diarrhea, or dysuria.    In the ED: Patient received in the ED. Noted to have HR in the 70s. Given albuterol treatment x1.  70 year old female with hx of tracheobronchomalacia s/p tracheobronchoplasty, lung nodules, severe allergic asthma, adrenal insufficiency, bronchiectasis, DM II, HTN, CRC s/p colostomy, mod-severe AI and PAF (on Eliquis) now presenting with palpitations and dyspnea on exertion. Patient states that she would occasionally have palpitations however over the past 2 days the frequency of palpitations has increased. She has also had generalized weakness and lightheadedness, with most of the symptoms resolving while at rest. She also occasionally endorses feeling some chest pressure when she has palpitations. Patient was also noted to have episode of bradycardia to the 40s at her rehab facility. Of note, patient was recently switched off of digoxin to diltiazem. She currently denies fever, chills, CP, n/v, abd pain, diarrhea, or dysuria.    In the ED: Patient received in the ED. Noted to have HR in the 70s. Given albuterol treatment x1.

## 2021-04-20 NOTE — ED PROVIDER NOTE - OBJECTIVE STATEMENT
hx of asthma, tracheomalacia, asthma, colo-rectal cancer, presenting from    [facility] 1 mo ago for SOB.    From pulmonary rehab for lightheadedness and feeling as though she was going to pass out.  Associated SOB x 2 days. Also reports nausea. Denies CP.  afib x 2 days intermittently w/ palpitations.  On Eliquis.   Began while seated.    Had episode of slow heart rate to 40 bpm,.    PCP: Dr. Nicole Mcarthur: Dr. Allison 71 yo F hx of SHELLFISH/IODINE Allergy, ASA Allergy, PMH tracheobronchomalacia s/p trancehobronchoplasty, lung nodules, severe allergic asthma (on methylprednisolone 4 mg QD and dupilumuab), adrenal insufficiency, bronchiectasis, DM II, HTN, mod-severe AI (by ECHO 9/2020 and follows with Dr John) PAF (Eliquis and carvediolol), colo-rectal cancer, presenting from pulmonary rehab for increasing afib intermittently x 2 days associated with SOB and KRAMER, generalized weakness, lightheadedness x 2 days, and episode of bradycardia to 40s today. States her symptoms are present at rest intermittently. Denies CP, cough, fevers, and chills. Denies N/V. Pt states that she was recently started on carvedilol and is no longer taking digoxin.     PCP: Dr. Nicole Mcarthur: Dr. Allison

## 2021-04-20 NOTE — ED PROVIDER NOTE - PHYSICAL EXAMINATION
Gen: A&Ox4.   HEENT: Atraumatic. No pharyngeal erythema or exudates. Mucous membranes moist, no scleral icterus.   CV: RRR. No M/R/G. No significant LE edema. Distal pulses intact. Capillary refill < 2 seconds.  Resp: Normal effort. End expiratory wheezes bilat.  GI: Abdomen non tender to palpation, soft and non-distended. No masses appreciated. + BS.  MSK: No open wounds. No ecchymosis appreciated.  Neuro: Following commands. Moving extremities spontaneously.  Psych: Appropriate mood, cooperative

## 2021-04-21 DIAGNOSIS — E11.9 TYPE 2 DIABETES MELLITUS WITHOUT COMPLICATIONS: ICD-10-CM

## 2021-04-21 DIAGNOSIS — J44.9 CHRONIC OBSTRUCTIVE PULMONARY DISEASE, UNSPECIFIED: ICD-10-CM

## 2021-04-21 DIAGNOSIS — I48.91 UNSPECIFIED ATRIAL FIBRILLATION: ICD-10-CM

## 2021-04-21 DIAGNOSIS — R00.2 PALPITATIONS: ICD-10-CM

## 2021-04-21 DIAGNOSIS — E27.40 UNSPECIFIED ADRENOCORTICAL INSUFFICIENCY: ICD-10-CM

## 2021-04-21 DIAGNOSIS — Z29.9 ENCOUNTER FOR PROPHYLACTIC MEASURES, UNSPECIFIED: ICD-10-CM

## 2021-04-21 LAB
A1C WITH ESTIMATED AVERAGE GLUCOSE RESULT: 7.3 % — HIGH (ref 4–5.6)
ANION GAP SERPL CALC-SCNC: 8 MMOL/L — SIGNIFICANT CHANGE UP (ref 7–14)
BASOPHILS # BLD AUTO: 0.02 K/UL — SIGNIFICANT CHANGE UP (ref 0–0.2)
BASOPHILS NFR BLD AUTO: 0.3 % — SIGNIFICANT CHANGE UP (ref 0–2)
BUN SERPL-MCNC: 20 MG/DL — SIGNIFICANT CHANGE UP (ref 7–23)
CALCIUM SERPL-MCNC: 8.9 MG/DL — SIGNIFICANT CHANGE UP (ref 8.4–10.5)
CHLORIDE SERPL-SCNC: 105 MMOL/L — SIGNIFICANT CHANGE UP (ref 98–107)
CO2 SERPL-SCNC: 26 MMOL/L — SIGNIFICANT CHANGE UP (ref 22–31)
COVID-19 SPIKE DOMAIN AB INTERP: POSITIVE
COVID-19 SPIKE DOMAIN ANTIBODY RESULT: >250 U/ML — HIGH
CREAT SERPL-MCNC: 0.77 MG/DL — SIGNIFICANT CHANGE UP (ref 0.5–1.3)
EOSINOPHIL # BLD AUTO: 0.15 K/UL — SIGNIFICANT CHANGE UP (ref 0–0.5)
EOSINOPHIL NFR BLD AUTO: 2 % — SIGNIFICANT CHANGE UP (ref 0–6)
ESTIMATED AVERAGE GLUCOSE: 163 MG/DL — HIGH (ref 68–114)
GLUCOSE BLDC GLUCOMTR-MCNC: 103 MG/DL — HIGH (ref 70–99)
GLUCOSE BLDC GLUCOMTR-MCNC: 120 MG/DL — HIGH (ref 70–99)
GLUCOSE BLDC GLUCOMTR-MCNC: 121 MG/DL — HIGH (ref 70–99)
GLUCOSE BLDC GLUCOMTR-MCNC: 182 MG/DL — HIGH (ref 70–99)
GLUCOSE BLDC GLUCOMTR-MCNC: 190 MG/DL — HIGH (ref 70–99)
GLUCOSE SERPL-MCNC: 101 MG/DL — HIGH (ref 70–99)
HCT VFR BLD CALC: 38.3 % — SIGNIFICANT CHANGE UP (ref 34.5–45)
HGB BLD-MCNC: 11.7 G/DL — SIGNIFICANT CHANGE UP (ref 11.5–15.5)
IANC: 4.57 K/UL — SIGNIFICANT CHANGE UP (ref 1.5–8.5)
IMM GRANULOCYTES NFR BLD AUTO: 0.3 % — SIGNIFICANT CHANGE UP (ref 0–1.5)
LYMPHOCYTES # BLD AUTO: 1.87 K/UL — SIGNIFICANT CHANGE UP (ref 1–3.3)
LYMPHOCYTES # BLD AUTO: 25.3 % — SIGNIFICANT CHANGE UP (ref 13–44)
MAGNESIUM SERPL-MCNC: 2.2 MG/DL — SIGNIFICANT CHANGE UP (ref 1.6–2.6)
MCHC RBC-ENTMCNC: 23.1 PG — LOW (ref 27–34)
MCHC RBC-ENTMCNC: 30.5 GM/DL — LOW (ref 32–36)
MCV RBC AUTO: 75.7 FL — LOW (ref 80–100)
MONOCYTES # BLD AUTO: 0.77 K/UL — SIGNIFICANT CHANGE UP (ref 0–0.9)
MONOCYTES NFR BLD AUTO: 10.4 % — SIGNIFICANT CHANGE UP (ref 2–14)
NEUTROPHILS # BLD AUTO: 4.57 K/UL — SIGNIFICANT CHANGE UP (ref 1.8–7.4)
NEUTROPHILS NFR BLD AUTO: 61.7 % — SIGNIFICANT CHANGE UP (ref 43–77)
NRBC # BLD: 0 /100 WBCS — SIGNIFICANT CHANGE UP
NRBC # FLD: 0 K/UL — SIGNIFICANT CHANGE UP
PHOSPHATE SERPL-MCNC: 3.6 MG/DL — SIGNIFICANT CHANGE UP (ref 2.5–4.5)
PLATELET # BLD AUTO: 254 K/UL — SIGNIFICANT CHANGE UP (ref 150–400)
POTASSIUM SERPL-MCNC: 4.4 MMOL/L — SIGNIFICANT CHANGE UP (ref 3.5–5.3)
POTASSIUM SERPL-SCNC: 4.4 MMOL/L — SIGNIFICANT CHANGE UP (ref 3.5–5.3)
RBC # BLD: 5.06 M/UL — SIGNIFICANT CHANGE UP (ref 3.8–5.2)
RBC # FLD: 17.3 % — HIGH (ref 10.3–14.5)
SARS-COV-2 IGG+IGM SERPL QL IA: >250 U/ML — HIGH
SARS-COV-2 IGG+IGM SERPL QL IA: POSITIVE
SODIUM SERPL-SCNC: 139 MMOL/L — SIGNIFICANT CHANGE UP (ref 135–145)
TROPONIN T, HIGH SENSITIVITY RESULT: 23 NG/L — SIGNIFICANT CHANGE UP
WBC # BLD: 7.4 K/UL — SIGNIFICANT CHANGE UP (ref 3.8–10.5)
WBC # FLD AUTO: 7.4 K/UL — SIGNIFICANT CHANGE UP (ref 3.8–10.5)

## 2021-04-21 PROCEDURE — 99233 SBSQ HOSP IP/OBS HIGH 50: CPT

## 2021-04-21 PROCEDURE — 99223 1ST HOSP IP/OBS HIGH 75: CPT | Mod: GC

## 2021-04-21 RX ORDER — ALBUTEROL 90 UG/1
2 AEROSOL, METERED ORAL EVERY 6 HOURS
Refills: 0 | Status: DISCONTINUED | OUTPATIENT
Start: 2021-04-21 | End: 2021-04-29

## 2021-04-21 RX ORDER — PANTOPRAZOLE SODIUM 20 MG/1
40 TABLET, DELAYED RELEASE ORAL
Refills: 0 | Status: DISCONTINUED | OUTPATIENT
Start: 2021-04-21 | End: 2021-04-29

## 2021-04-21 RX ORDER — SODIUM CHLORIDE 9 MG/ML
1000 INJECTION, SOLUTION INTRAVENOUS
Refills: 0 | Status: DISCONTINUED | OUTPATIENT
Start: 2021-04-21 | End: 2021-04-29

## 2021-04-21 RX ORDER — DEXTROSE 50 % IN WATER 50 %
25 SYRINGE (ML) INTRAVENOUS ONCE
Refills: 0 | Status: DISCONTINUED | OUTPATIENT
Start: 2021-04-21 | End: 2021-04-29

## 2021-04-21 RX ORDER — INSULIN LISPRO 100/ML
VIAL (ML) SUBCUTANEOUS
Refills: 0 | Status: DISCONTINUED | OUTPATIENT
Start: 2021-04-21 | End: 2021-04-29

## 2021-04-21 RX ORDER — TIOTROPIUM BROMIDE 18 UG/1
1 CAPSULE ORAL; RESPIRATORY (INHALATION) DAILY
Refills: 0 | Status: DISCONTINUED | OUTPATIENT
Start: 2021-04-21 | End: 2021-04-29

## 2021-04-21 RX ORDER — TIOTROPIUM BROMIDE 18 UG/1
1 CAPSULE ORAL; RESPIRATORY (INHALATION)
Qty: 0 | Refills: 0 | DISCHARGE

## 2021-04-21 RX ORDER — GLUCAGON INJECTION, SOLUTION 0.5 MG/.1ML
1 INJECTION, SOLUTION SUBCUTANEOUS ONCE
Refills: 0 | Status: DISCONTINUED | OUTPATIENT
Start: 2021-04-21 | End: 2021-04-29

## 2021-04-21 RX ORDER — ATORVASTATIN CALCIUM 80 MG/1
20 TABLET, FILM COATED ORAL AT BEDTIME
Refills: 0 | Status: DISCONTINUED | OUTPATIENT
Start: 2021-04-21 | End: 2021-04-29

## 2021-04-21 RX ORDER — FLUTICASONE PROPIONATE 50 MCG
1 SPRAY, SUSPENSION NASAL
Refills: 0 | Status: DISCONTINUED | OUTPATIENT
Start: 2021-04-21 | End: 2021-04-29

## 2021-04-21 RX ORDER — APIXABAN 2.5 MG/1
5 TABLET, FILM COATED ORAL
Refills: 0 | Status: DISCONTINUED | OUTPATIENT
Start: 2021-04-21 | End: 2021-04-29

## 2021-04-21 RX ORDER — CHLORHEXIDINE GLUCONATE 213 G/1000ML
1 SOLUTION TOPICAL DAILY
Refills: 0 | Status: DISCONTINUED | OUTPATIENT
Start: 2021-04-21 | End: 2021-04-29

## 2021-04-21 RX ORDER — FORMOTEROL FUMARATE 12 MCG
2 CAPSULE, WITH INHALATION DEVICE INHALATION
Qty: 0 | Refills: 0 | DISCHARGE

## 2021-04-21 RX ORDER — INSULIN GLARGINE 100 [IU]/ML
15 INJECTION, SOLUTION SUBCUTANEOUS AT BEDTIME
Refills: 0 | Status: DISCONTINUED | OUTPATIENT
Start: 2021-04-21 | End: 2021-04-29

## 2021-04-21 RX ORDER — DEXTROSE 50 % IN WATER 50 %
12.5 SYRINGE (ML) INTRAVENOUS ONCE
Refills: 0 | Status: DISCONTINUED | OUTPATIENT
Start: 2021-04-21 | End: 2021-04-29

## 2021-04-21 RX ORDER — INSULIN GLARGINE 100 [IU]/ML
12 INJECTION, SOLUTION SUBCUTANEOUS
Qty: 0 | Refills: 0 | DISCHARGE

## 2021-04-21 RX ORDER — BUDESONIDE AND FORMOTEROL FUMARATE DIHYDRATE 160; 4.5 UG/1; UG/1
2 AEROSOL RESPIRATORY (INHALATION)
Refills: 0 | Status: DISCONTINUED | OUTPATIENT
Start: 2021-04-21 | End: 2021-04-29

## 2021-04-21 RX ORDER — INSULIN GLARGINE 100 [IU]/ML
15 INJECTION, SOLUTION SUBCUTANEOUS ONCE
Refills: 0 | Status: COMPLETED | OUTPATIENT
Start: 2021-04-21 | End: 2021-04-21

## 2021-04-21 RX ORDER — DEXTROSE 50 % IN WATER 50 %
15 SYRINGE (ML) INTRAVENOUS ONCE
Refills: 0 | Status: DISCONTINUED | OUTPATIENT
Start: 2021-04-21 | End: 2021-04-29

## 2021-04-21 RX ORDER — ROFLUMILAST 500 UG/1
250 TABLET ORAL DAILY
Refills: 0 | Status: DISCONTINUED | OUTPATIENT
Start: 2021-04-21 | End: 2021-04-29

## 2021-04-21 RX ADMIN — CHLORHEXIDINE GLUCONATE 1 APPLICATION(S): 213 SOLUTION TOPICAL at 14:37

## 2021-04-21 RX ADMIN — Medication 4 MILLIGRAM(S): at 05:35

## 2021-04-21 RX ADMIN — Medication 1: at 13:19

## 2021-04-21 RX ADMIN — TIOTROPIUM BROMIDE 1 CAPSULE(S): 18 CAPSULE ORAL; RESPIRATORY (INHALATION) at 18:54

## 2021-04-21 RX ADMIN — APIXABAN 5 MILLIGRAM(S): 2.5 TABLET, FILM COATED ORAL at 05:35

## 2021-04-21 RX ADMIN — BUDESONIDE AND FORMOTEROL FUMARATE DIHYDRATE 2 PUFF(S): 160; 4.5 AEROSOL RESPIRATORY (INHALATION) at 22:26

## 2021-04-21 RX ADMIN — PANTOPRAZOLE SODIUM 40 MILLIGRAM(S): 20 TABLET, DELAYED RELEASE ORAL at 08:24

## 2021-04-21 RX ADMIN — INSULIN GLARGINE 15 UNIT(S): 100 INJECTION, SOLUTION SUBCUTANEOUS at 22:26

## 2021-04-21 RX ADMIN — ATORVASTATIN CALCIUM 20 MILLIGRAM(S): 80 TABLET, FILM COATED ORAL at 21:27

## 2021-04-21 RX ADMIN — ROFLUMILAST 250 MICROGRAM(S): 500 TABLET ORAL at 13:19

## 2021-04-21 RX ADMIN — APIXABAN 5 MILLIGRAM(S): 2.5 TABLET, FILM COATED ORAL at 18:53

## 2021-04-21 RX ADMIN — INSULIN GLARGINE 15 UNIT(S): 100 INJECTION, SOLUTION SUBCUTANEOUS at 02:02

## 2021-04-21 NOTE — CONSULT NOTE ADULT - SUBJECTIVE AND OBJECTIVE BOX
CHIEF COMPLAINT: Palpations    HPI:    The patient is a 70 Y.o. female with pmh of Asthma, severe persistent on Dupixent TBM s/p laser tracheoplasty, possible MM, allergies, adrenal insufficiency on medrol 4mg who presents with palpations, sob, khan. Patient has long history of severe RF 2/2 to asthma with multiple intubations and ICU admissions. Patient was at pulmonary rehab when patient presented with sob. Always has some wheezing. She states this is different from her typical asthma exacerbation Had palpitations which lead to her sob. Has been using her inhalers and bronchodilators at home. Unclear when next dose of Dupixent is. Currently patient is doing well, no sob, no additional palpations. Is allergic to dust and cats. Has pet cats at home.     PAST MEDICAL & SURGICAL HISTORY:  Atrial fibrillation  paroxysmal, on eliquis    Diabetes  Type 2    COPD (chronic obstructive pulmonary disease)    Adrenal insufficiency  Medrol daily for over 50 years    Aortic insufficiency  moderate AR on echo 5/3/2018    Pelvic fracture    Asthma    Tracheobronchomalacia  diagnosed 2015, s/p bronchial thermoplasty 2016 (Dr Zapien); recent bronchoscopy 6/5/2018 revealed no evidence of tracheobronchomalacia in trachea or bronchial tubes    Colorectal cancer  4/2018- last treatment , chemo and radiation    Rectal bleeding    Seizure  x 1 1/7/18    DVT (deep venous thrombosis)  15-20 years ago, took coumadin    TIA (transient ischemic attack)  multiple, last 5 years ago - presents as right-sided weakness    History of partial hysterectomy  30 years ago - fibroids    H/O total knee replacement, bilateral  5 years ago    History of sinus surgery  multiple sinus surgeries    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    H/O pelvic surgery  5 years ago - s/p fracture    History of tracheomalacia  2015 - attempted tracheal stenting ([facility])- course complicated by obstruction, respiratory failure, multiple CPR attempts -  stent discontinued; 10/20/2016 Tracheobronchoplasty (Prolene Mesh) performed at [facility] by Dr Zapien    S/P bronchoscopy  6/5/2018 - [facility] (Dr Zapien) no evidence of tracheobronchomalacia in trachea or bronchial tubes    Rectal bleeding  exam under anesthesia ([facility]) 2/2018        FAMILY HISTORY:  Family history of asthma    Family history of breast cancer (Sibling)    Family history of diabetes mellitus type II        SOCIAL HISTORY:  Smoking: [x ] Never Smoked [ ] Former Smoker (__ packs x ___ years) [ ] Current Smoker  (__ packs x ___ years)  Substance Use: [x ] Never Used [ ] Used ____  EtOH Use: none  Retired, former [employer]  Advance Directives:    Allergies    ampicillin (Short breath)  aspirin (Short breath)  Avelox (Short breath; Pruritus)  codeine (Short breath)  Dilaudid (Short breath)  iodine (Short breath; Swelling)  penicillin (Short breath)  shellfish (Anaphylaxis)  tetanus toxoid (Short breath)  Valium (Short breath)    Intolerances        HOME MEDICATIONS:  Home Medications:  apixaban 5 mg oral tablet: 1 tab(s) orally every 12 hours (21 Apr 2021 00:17)  Breo Ellipta 200 mcg-25 mcg/inh inhalation powder: 1 puff(s) inhaled once a day (21 Apr 2021 00:17)  Crestor 5 mg oral tablet: 1 tab(s) orally once a day (21 Apr 2021 00:17)  Daliresp 250 mcg oral tablet: 1 tab(s) orally once a day (21 Apr 2021 00:17)  Dupixent 300 mg/2 mL subcutaneous solution: 1  subcutaneous every 2 weeks (21 Apr 2021 00:17)  HumaLOG 100 units/mL subcutaneous solution: Sliding Scale (21 Apr 2021 00:17)  ipratropium-albuterol 0.5 mg-2.5 mg/3 mLinhalation solution: 3 milliliter(s) inhaled every 6 hours (21 Apr 2021 00:17)  Lantus 100 units/mL subcutaneous solution: 20 unit(s) subcutaneous once a day (in the morning) (21 Apr 2021 00:17)  methylPREDNISolone 4 mg oral tablet: 1 tab(s) orally once a day (21 Apr 2021 00:17)  Spiriva 18 mcg inhalation capsule: 1 cap(s) inhaled once a day (21 Apr 2021 00:17)  Victoza 18 mg/3 mL subcutaneous solution: 1.8 milligram(s) subcutaneous once a day (21 Apr 2021 00:17)      REVIEW OF SYSTEMS:  Constitutional: [x ] negative [ ] fevers [ ] chills [ ] weight loss [ ] weight gain  HEENT: [x ] negative [ ] dry eyes [ ] eye irritation [ ] postnasal drip [ ] nasal congestion  CV: [ ] negative  [ ] chest pain [ ] orthopnea [ x] palpitations [ ] murmur  Resp: [ ] negative [ ] cough [ x] shortness of breath [x ] dyspnea [x ] wheezing [ ] sputum [ ] hemoptysis  GI: [ x] negative [ ] nausea [ ] vomiting [ ] diarrhea [ ] constipation [ ] abd pain [ ] dysphagia   : [ x] negative [ ] dysuria [ ] nocturia [ ] hematuria [ ] increased urinary frequency  Musculoskeletal: [x ] negative [ ] back pain [ ] myalgias [ ] arthralgias [ ] fracture  Skin: [x ] negative [ ] rash [ ] itch  Neurological: [ ]x negative [ ] headache [ ] dizziness [ ] syncope [ ] weakness [ ] numbness  Psychiatric: [x ] negative [ ] anxiety [ ] depression  Endocrine: [x ] negative [ ] diabetes [ ] thyroid problem  Hematologic/Lymphatic: [x ] negative [ ] anemia [ ] bleeding problem  Allergic/Immunologic: [x ] negative [ ] itchy eyes [ ] nasal discharge [ ] hives [ ] angioedema  [ ] All other systems negative  [ ] Unable to assess ROS because ________    OBJECTIVE:  ICU Vital Signs Last 24 Hrs  T(C): 36.8 (21 Apr 2021 08:21), Max: 37 (20 Apr 2021 15:51)  T(F): 98.2 (21 Apr 2021 08:21), Max: 98.6 (20 Apr 2021 15:51)  HR: 61 (21 Apr 2021 08:21) (61 - 78)  BP: 130/53 (21 Apr 2021 08:21) (130/53 - 155/50)  BP(mean): --  ABP: --  ABP(mean): --  RR: 18 (21 Apr 2021 08:21) (18 - 26)  SpO2: 100% (21 Apr 2021 08:21) (98% - 100%)        CAPILLARY BLOOD GLUCOSE      POCT Blood Glucose.: 120 mg/dL (21 Apr 2021 08:58)    PHYSICAL EXAM:    Constitutional: well-developed; well-groomed; well-nourished; no distress,  Eyes: PERRL; EOMI  ENMT: Normal oropharnxy,  Neck:  Supple; no JVD;   Respiratory: airway patent; faint wheezing, mild, expiratory.   Cardiovascular: regular rate and rhythm  no rub , no murmur, no gallops.   Gastrointestinal: soft; no distention, normal BS, no TTP, no organomegaly, no ascites.  Extremities: no clubbing; no cyanosis; no pedal edema  Neurological: alert and oriented x 3;  Skin: warm and dry; color normal: no rash: no ulcers  Psychiatric: Calm, no SI/HI        LINES:     HOSPITAL MEDICATIONS:  Standing Meds:  apixaban 5 milliGRAM(s) Oral two times a day  atorvastatin 20 milliGRAM(s) Oral at bedtime  dextrose 40% Gel 15 Gram(s) Oral once  dextrose 5%. 1000 milliLiter(s) IV Continuous <Continuous>  dextrose 5%. 1000 milliLiter(s) IV Continuous <Continuous>  dextrose 50% Injectable 25 Gram(s) IV Push once  dextrose 50% Injectable 12.5 Gram(s) IV Push once  dextrose 50% Injectable 25 Gram(s) IV Push once  glucagon  Injectable 1 milliGRAM(s) IntraMuscular once  insulin glargine Injectable (LANTUS) 15 Unit(s) SubCutaneous at bedtime  insulin lispro (ADMELOG) corrective regimen sliding scale   SubCutaneous three times a day before meals  methylPREDNISolone 4 milliGRAM(s) Oral daily  pantoprazole    Tablet 40 milliGRAM(s) Oral before breakfast  roflumilast 250 MICROGram(s) Oral daily  tiotropium 18 MICROgram(s) Capsule 1 Capsule(s) Inhalation daily      PRN Meds:  ALBUTerol    90 MICROgram(s) HFA Inhaler 2 Puff(s) Inhalation every 6 hours PRN      LABS:                        11.7   7.40  )-----------( 254      ( 21 Apr 2021 05:20 )             38.3     Hgb Trend: 11.7<--, 11.4<--  04-21    139  |  105  |  20  ----------------------------<  101<H>  4.4   |  26  |  0.77    Ca    8.9      21 Apr 2021 05:20  Phos  3.6     04-21  Mg     2.2     04-21    TPro  6.7  /  Alb  3.7  /  TBili  <0.2  /  DBili  x   /  AST  17  /  ALT  15  /  AlkPhos  81  04-20    Creatinine Trend: 0.77<--, 0.68<--        Venous Blood Gas:  04-20 @ 16:55  7.39/47/43/26/73.4  VBG Lactate: 1.7      MICROBIOLOGY:       RADIOLOGY:  [ ] Reviewed and interpreted by me    PULMONARY FUNCTION TESTS:    EKG:

## 2021-04-21 NOTE — CONSULT NOTE ADULT - ASSESSMENT
70 Y.o. female with pmh of Asthma, severe persistent on Dupixent TBM s/p laser tracheoplasty, possible MM, allergies, adrenal insufficiency on medrol 4mg who presents with palpations, sob, kramer.     # Palpitations  # Severe persistent asthma on immunotherapy  # SOB/KRAMER  - States this episode is not similar to her previous asthma exacerbation Eso 150, unclear when next dose of dupixent is due  - Can give prednisone 40mg x 5 days, and restart home medrol after.   - Albuterol q6 hours, Symbicort BID, symbicort daily. At discharge patient can go back on home meds.   - Flonase BID.   - C/W daliresp  - Continue with workup for palpitations and kramer. F/U TTE. Unclear if this is asthma flare vs cardiac.   - Patient can follow up with Dr. Allison on discharge.     Dae Hyeon Kim MD-PGY6  Pulmonary Critical Care Fellow  Pager [phone]   70 Y.o. female with pmh of Asthma, severe persistent on Dupixent TBM s/p laser tracheoplasty, possible MM, allergies, adrenal insufficiency on medrol 4mg who presents with palpations, sob, kramer. Patient had an episode of tachy-cade episode in the ED with SOB.    # Palpitations, symptomatic tachy-cade.   # Severe persistent asthma on immunotherapy  # SOB/KRAMER  - States this episode is not similar to her previous asthma exacerbation Eso 150, unclear when next dose of dupixent is due  - Can give prednisone 40mg x 5 days, and restart home medrol after.   - Albuterol q6 hours, Symbicort BID, symbicort daily. At discharge patient can go back on home meds.   - Flonase BID.   - C/W daliresp  - Continue with workup for palpitations and kramer. F/U TTE. Unclear if this is asthma flare vs cardiac.   - Patient can follow up with Dr. Allison on discharge.     Dae Hyeon Kim MD-PGY6  Pulmonary Critical Care Fellow  Pager [phone]   70 Y.o. female with pmh of Asthma, severe persistent on Dupixent TBM s/p laser tracheoplasty, possible MM, allergies, adrenal insufficiency on medrol 4mg who presents with palpations, sob, kramer. Patient had an episode of tachy-cade episode in the ED with SOB.    # Palpitations, symptomatic tachy-cade.   # Severe persistent asthma on immunotherapy  # SOB/KRAMER  - States this episode is not similar to her previous asthma exacerbation Eso 150, unclear when next dose of dupixent is due  - Can give prednisone 40mg x 5 days, and restart home medrol after.   - Albuterol q6 hours, Symbicort BID,  Spiriva daily. At discharge patient can go back on home meds.   - Flonase BID.   - C/W daliresp  - Continue with workup for palpitations and kramer. F/U TTE. Unclear if this is asthma flare vs cardiac.   - Patient can follow up with Dr. Allison on discharge.     Dae Hyeon Kim MD-PGY6  Pulmonary Critical Care Fellow  Pager [phone]

## 2021-04-21 NOTE — CONSULT NOTE ADULT - ATTENDING COMMENTS
70 year old woman with a pmhx of mod-severe AI and PAF (on Eliquis), tracheobronchomalacia s/p tracheobronchoplasty, lung nodules, severe allergic asthma, adrenal insufficiency, bronchiectasis, T2DM, HTN, CRC s/p colostomy who presented to [facility] ED from pulmonary rehab due to worsening SOB and palpitation. According to the patient, this is different from her typical asthma exacerbation and stated that  her palpitations lead to her sob. Patient believes that she went into" Afib " on Saturday  based on her worsening symptoms. There is no EKG or telemetric strip showing this. She also stated that the palpitation was associated with a band-like tightness around her abdomen, weakness, and dizziness which she has been experiencing for the past year. Pulmonology started her on prednisone 40mg x 5 days given possible asthma exacerbation with plans to restart home medrol after. Ep was consulted for Afib and bradycardia. Upon my evaluation, patient was in SR with frequent PVC and bigeminy HR 70-90s. Will obtain TTE. Cont AC- will watch on tele for AF. Will follow.

## 2021-04-21 NOTE — PROGRESS NOTE ADULT - SUBJECTIVE AND OBJECTIVE BOX
Hospitalist Progress Note  Authored by: Isabel Willis MD pager # [phone]    OVERNIGHT EVENTS: Admitted overnight.     SUBJECTIVE / INTERVAL HPI: Patient seen and examined at bedside. Reports worsening SOB over the past few months. Currently, feeling very SOB after going to the bathroom, but denies wheezing. She does not think this is her asthma and really thinks this is all due to her heart. She denies chest pain. She also denies abdominal pain, nausea/vomiting. No lower extremity swelling.     VITAL SIGNS:  Vital Signs Last 24 Hrs  T(C): 36.8 (21 Apr 2021 08:21), Max: 37 (20 Apr 2021 15:51)  T(F): 98.2 (21 Apr 2021 08:21), Max: 98.6 (20 Apr 2021 15:51)  HR: 61 (21 Apr 2021 08:21) (61 - 78)  BP: 130/53 (21 Apr 2021 08:21) (130/53 - 155/50)  BP(mean): --  RR: 18 (21 Apr 2021 08:21) (18 - 24)  SpO2: 100% (21 Apr 2021 08:21) (98% - 100%)    PHYSICAL EXAM:    General: WDWN female resting in bed   HEENT: NC/AT; PERRL, anicteric sclera; MMM  Neck: supple  Cardiovascular: +S1/S2; RRR  Respiratory: CTA B/L; no wheezing. no crackles. lungs sounded clear however pt with tachypnea and accessory muscle use after walking from the bathroom.   Gastrointestinal: soft, NT/ND; +BSx4  Extremities: WWP; no edema, clubbing or cyanosis  Vascular: 2+ radial, DP/PT pulses B/L  Neurological: AAOx3; no focal deficits    MEDICATIONS:  MEDICATIONS  (STANDING):  apixaban 5 milliGRAM(s) Oral two times a day  atorvastatin 20 milliGRAM(s) Oral at bedtime  budesonide  80 MICROgram(s)/formoterol 4.5 MICROgram(s) Inhaler 2 Puff(s) Inhalation two times a day  chlorhexidine 2% Cloths 1 Application(s) Topical daily  dextrose 40% Gel 15 Gram(s) Oral once  dextrose 5%. 1000 milliLiter(s) (50 mL/Hr) IV Continuous <Continuous>  dextrose 5%. 1000 milliLiter(s) (100 mL/Hr) IV Continuous <Continuous>  dextrose 50% Injectable 25 Gram(s) IV Push once  dextrose 50% Injectable 12.5 Gram(s) IV Push once  dextrose 50% Injectable 25 Gram(s) IV Push once  fluticasone propionate 50 MICROgram(s)/spray Nasal Spray 1 Spray(s) Both Nostrils two times a day  glucagon  Injectable 1 milliGRAM(s) IntraMuscular once  insulin glargine Injectable (LANTUS) 15 Unit(s) SubCutaneous at bedtime  insulin lispro (ADMELOG) corrective regimen sliding scale   SubCutaneous three times a day before meals  pantoprazole    Tablet 40 milliGRAM(s) Oral before breakfast  roflumilast 250 MICROGram(s) Oral daily  tiotropium 18 MICROgram(s) Capsule 1 Capsule(s) Inhalation daily    MEDICATIONS  (PRN):  ALBUTerol    90 MICROgram(s) HFA Inhaler 2 Puff(s) Inhalation every 6 hours PRN Shortness of Breath and/or Wheezing      ALLERGIES:  Allergies    ampicillin (Short breath)  aspirin (Short breath)  Avelox (Short breath; Pruritus)  codeine (Short breath)  Dilaudid (Short breath)  iodine (Short breath; Swelling)  penicillin (Short breath)  shellfish (Anaphylaxis)  tetanus toxoid (Short breath)  Valium (Short breath)    Intolerances        LABS:                        11.7   7.40  )-----------( 254      ( 21 Apr 2021 05:20 )             38.3     04-21    139  |  105  |  20  ----------------------------<  101<H>  4.4   |  26  |  0.77    Ca    8.9      21 Apr 2021 05:20  Phos  3.6     04-21  Mg     2.2     04-21    TPro  6.7  /  Alb  3.7  /  TBili  <0.2  /  DBili  x   /  AST  17  /  ALT  15  /  AlkPhos  81  04-20        CAPILLARY BLOOD GLUCOSE      POCT Blood Glucose.: 182 mg/dL (21 Apr 2021 12:59)      RADIOLOGY & ADDITIONAL TESTS: Reviewed.    ASSESSMENT:    PLAN:

## 2021-04-21 NOTE — CONSULT NOTE ADULT - SUBJECTIVE AND OBJECTIVE BOX
Source: patient and Chart    HPI:  This is a 70 year old woman with a pmhx of mod-severe AI and PAF (on Eliquis), tracheobronchomalacia s/p tracheobronchoplasty, lung nodules, severe allergic asthma, adrenal insufficiency, bronchiectasis, T2DM, HTN, CRC s/p colostomy who presented to [facility] ED from pulmonary rehab due to worsening SOB and palpitation.     According to the patient, this is different from her typical asthma exacerbation and stated that  her palpitations which lead to her sob. She admitted to using her inhalers and bronchodilators at home. She also stated that the palpitation was associated with a band-like tightness around her abdomen, weakness, and lightheadedness. Patient started that she has had palpitation, the abdomen tightness, lightheadedness and KRAMER for the past year. She stated that over the past year, she was only able to ambulated a few steps before becoming SOB and having palpitation. She also stated that baseline she is able to sleep on 1 pillow with no SOB but recently had to sit up to relieve her SOB while lying down and occasionally need to lean froward       now presenting with palpitations and dyspnea on exertion  Patient denied fever, chills, diaphoresis, HA, lightheadedness, dizziness, changes in visions, cold like symptoms  (sore throat cough, conjunctivitis runny nose), CP, palpitation, SOB, abdominal pain, n/v/d, hematuria, melena, hematochezia numbness and tingling    ED course was significant for T=  BP___ RR___spo2 __% on RA. Labs were significant for WB __, Hgb __, HCT __, Na __, K __, sCr __, TSH __,  FT4 __, and Troponin 1 negative x2.  CXR revealed __. and EKG.    Upon my evaluation, patient was in SR on telemetric with no events. Patient denied HA, lightheadedness, dizziness, changes in visions, cold like symptoms, CP, palpitation, SOB, abdominal pain, and n/v/d.     PAST MEDICAL & SURGICAL HISTORY:  Atrial fibrillation  paroxysmal, on eliquis    Diabetes  Type 2    COPD (chronic obstructive pulmonary disease)    Adrenal insufficiency  Medrol daily for over 50 years    Aortic insufficiency  moderate AR on echo 5/3/2018    Pelvic fracture    Asthma    Tracheobronchomalacia  diagnosed 2015, s/p bronchial thermoplasty 2016 (Dr Zapien); recent bronchoscopy 6/5/2018 revealed no evidence of tracheobronchomalacia in trachea or bronchial tubes    Colorectal cancer  4/2018- last treatment , chemo and radiation    Rectal bleeding    Seizure  x 1 1/7/18    DVT (deep venous thrombosis)  15-20 years ago, took coumadin    TIA (transient ischemic attack)  multiple, last 5 years ago - presents as right-sided weakness    History of partial hysterectomy  30 years ago - fibroids    H/O total knee replacement, bilateral  5 years ago    History of sinus surgery  multiple sinus surgeries    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    H/O pelvic surgery  5 years ago - s/p fracture    History of tracheomalacia  2015 - attempted tracheal stenting ([facility])- course complicated by obstruction, respiratory failure, multiple CPR attempts -  stent discontinued; 10/20/2016 Tracheobronchoplasty (Prolene Mesh) performed at [facility] by Dr Zapien    S/P bronchoscopy  6/5/2018 - [place] (Dr Zapien) no evidence of tracheobronchomalacia in trachea or bronchial tubes    Rectal bleeding  exam under anesthesia ([facility]) 2/2018          MEDICATIONS  (STANDING):  apixaban 5 milliGRAM(s) Oral two times a day  atorvastatin 20 milliGRAM(s) Oral at bedtime  budesonide  80 MICROgram(s)/formoterol 4.5 MICROgram(s) Inhaler 2 Puff(s) Inhalation two times a day  chlorhexidine 2% Cloths 1 Application(s) Topical daily  dextrose 40% Gel 15 Gram(s) Oral once  dextrose 5%. 1000 milliLiter(s) (50 mL/Hr) IV Continuous <Continuous>  dextrose 5%. 1000 milliLiter(s) (100 mL/Hr) IV Continuous <Continuous>  dextrose 50% Injectable 25 Gram(s) IV Push once  dextrose 50% Injectable 12.5 Gram(s) IV Push once  dextrose 50% Injectable 25 Gram(s) IV Push once  fluticasone propionate 50 MICROgram(s)/spray Nasal Spray 1 Spray(s) Both Nostrils two times a day  glucagon  Injectable 1 milliGRAM(s) IntraMuscular once  insulin glargine Injectable (LANTUS) 15 Unit(s) SubCutaneous at bedtime  insulin lispro (ADMELOG) corrective regimen sliding scale   SubCutaneous three times a day before meals  pantoprazole    Tablet 40 milliGRAM(s) Oral before breakfast  roflumilast 250 MICROGram(s) Oral daily  tiotropium 18 MICROgram(s) Capsule 1 Capsule(s) Inhalation daily    MEDICATIONS  (PRN):  ALBUTerol    90 MICROgram(s) HFA Inhaler 2 Puff(s) Inhalation every 6 hours PRN Shortness of Breath and/or Wheezing      FAMILY HISTORY:  Family history of asthma    Family history of breast cancer (Sibling)    Family history of diabetes mellitus type II        SOCIAL HISTORY:    LIVING SITUATION:  CIGARETTES: Denied  ALCOHOL: denied   ILLICIT DRUG USES: denied    REVIEW OF SYSTEMS:  CONSTITUTIONAL: No fever, weight loss, chills, shakes, or fatigue  EYES: No eye pain, visual disturbances, or discharge  ENMT:  No difficulty hearing, tinnitus, vertigo; No sinus or throat pain  NECK: No pain or stiffness  RESPIRATORY: No cough, wheezing, hemoptysis, or shortness of breath  CARDIOVASCULAR: No chest pain, dyspnea, palpitations, dizziness, syncope, paroxysmal nocturnal dyspnea, orthopnea, or arm or leg swelling  GASTROINTESTINAL: No abdominal  or epigastric pain, nausea, vomiting, hematemesis, diarrhea, constipation, melena or bright red blood.  GENITOURINARY: No dysuria, nocturia, hematuria, or urinary incontinence  NEUROLOGICAL: No headaches, memory loss, slurred speech, limb weakness, loss of strength, numbness, or tremors  MUSCULOSKELETAL: No joint pain or swelling, muscle, back, or extremity pain  PSYCHIATRIC: No depression, anxiety, or difficulty sleeping        Vital Signs Last 24 Hrs  T(C): 36.8 (21 Apr 2021 08:21), Max: 36.8 (21 Apr 2021 08:21)  T(F): 98.2 (21 Apr 2021 08:21), Max: 98.2 (21 Apr 2021 08:21)  HR: 61 (21 Apr 2021 08:21) (61 - 78)  BP: 130/53 (21 Apr 2021 08:21) (130/53 - 155/50)  BP(mean): --  RR: 18 (21 Apr 2021 08:21) (18 - 21)  SpO2: 100% (21 Apr 2021 08:21) (99% - 100%)    PHYSICAL EXAM:  GENERAL: Well appearing, speaking in full sentence, in NAD  HEAD:  Atraumatic, Normocephalic  EYES: EOMI, PERRLA, conjunctiva and sclera clear  ENMT: No tonsillar erythema, exudates, or enlargement; Moist mucous membranes, Good dentition, No lesions  NECK: Supple and normal thyroid.  No JVD or carotid bruit.  Carotid pulse is 2+ bilaterally.  HEART: S1S2 RRR; No murmurs, rubs, or gallops appreciated .  PULMONARY:CTABL, normal respiratory effort.  No rales, wheezing, or rhonchi appreciated bilaterally  ABDOMEN: Bowel sounds present, soft, NDNT  EXTREMITIES:  Warm, well -perfused, no pedal edema, distal pulses present  NEUROLOGICAL:AOx3 ,  motor function grossly  intact    INTERPRETATION OF TELEMETRY:    ECG:    I&O's Detail      LABS:                        11.7   7.40  )-----------( 254      ( 21 Apr 2021 05:20 )             38.3     04-21    139  |  105  |  20  ----------------------------<  101<H>  4.4   |  26  |  0.77    Ca    8.9      21 Apr 2021 05:20  Phos  3.6     04-21  Mg     2.2     04-21    TPro  6.7  /  Alb  3.7  /  TBili  <0.2  /  DBili  x   /  AST  17  /  ALT  15  /  AlkPhos  81  04-20            BNPSerum Pro-Brain Natriuretic Peptide: 340 pg/mL (04-20 @ 16:55)    I&O's Detail    Daily     Daily     RADIOLOGY & ADDITIONAL STUDIES:        ASSESSMENT:        RECOMMENDATIONS:    Patient to be staffed with attending. Please await attending addendum Source: patient and Chart    HPI:  This is a 70 year old woman with a pmhx of mod-severe AI and PAF (on Eliquis), PVC, tracheobronchomalacia s/p tracheobronchoplasty, lung nodules, severe allergic asthma, adrenal insufficiency, bronchiectasis, T2DM, HTN, CRC s/p colostomy who presented to [facility] ED from pulmonary rehab due to worsening SOB and palpitation.     According to the patient, this is different from her typical asthma exacerbation and stated that  her palpitations lead to her sob. Patient believes that she went into" Afib " on Saturday  based on her worsening symptoms. There is no EKG or telemetric strip showing this. She admitted to using her inhalers and bronchodilators at home. She also stated that the palpitation was associated with a band-like tightness around her abdomen, weakness, and dizziness. Patient started that she has had palpitation, the abdomen tightness, lightheadedness and KRAMER for the past year. She see an outpatient pulmonologist and electrophysiologist at [facility]. She stated that over the past year, she was only able to ambulated a few steps before becoming SOB and having palpitation. She also stated that at baseline she sleeps on 1 pillow with no SOB but recently she had to sit up and occasionally lean forward to relieve her SOB. According to the patient, she also noted that she was occasionally bradycardiac when she manually felt her pulse (HR 40s). Patient denied fever, chills, diaphoresis, HA, changes in visions, cold like symptoms  (sore throat cough, conjunctivitis runny nose), n/v/d, hematuria, melena, and hematochezia. Of noted, patient stated that her doctor recently changed her medication from digoxin to diltiazem 30mg po BID to better control her PVCs.    ED course was significant for T=98.1, /67, RR 26 spo2 100% on RA. Labs were significant for WBC 8.01, Hgb 11.4, HCT 37.6, Plt 268, Na 139, K 4.4, sCrb0.77. Mg 2.2 and LFT wnl, and pro-. EKG SR with frequent PVC HR 77. Patient was seen by pulmonology who started her on prednisone 40mg x 5 days for possible asthma exacerbation with plans to restart home medrol after.     Ep was consulted for Afib and bradycardia. Upon my evaluation, patient was in SR with frequent PVC and bigeminy HR 70-90s. Patient denied HA, lightheadedness, dizziness, changes in visions, cold like symptoms, CP, and n/v/d. She continues to have abdominal thickness, palpitation and SOB.    PAST MEDICAL & SURGICAL HISTORY:  Atrial fibrillation  paroxysmal, on Eliquis    Diabetes  Type 2    COPD (chronic obstructive pulmonary disease)    Adrenal insufficiency  Medrol daily for over 50 years    Aortic insufficiency  moderate AR on echo 5/3/2018    Pelvic fracture    Asthma    Tracheobronchomalacia  diagnosed , s/p bronchial thermoplasty  (Dr Zapien); recent bronchoscopy 2018 revealed no evidence of tracheobronchomalacia in trachea or bronchial tubes    Colorectal cancer  2018- last treatment , chemo and radiation    Rectal bleeding    Seizure  x 1 18    DVT (deep venous thrombosis)  15-20 years ago, took coumadin    TIA (transient ischemic attack)  multiple, last 5 years ago - presents as right-sided weakness    History of partial hysterectomy  30 years ago - fibroids    H/O total knee replacement, bilateral  5 years ago    History of sinus surgery  multiple sinus surgeries    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    H/O pelvic surgery  5 years ago - s/p fracture    History of tracheomalacia   - attempted tracheal stenting ([facility])- course complicated by obstruction, respiratory failure, multiple CPR attempts -  stent discontinued; 10/20/2016 Tracheobronchoplasty (Prolene Mesh) performed at [facility] by Dr Zapien    S/P bronchoscopy  2018 - [place] (Dr Zapien) no evidence of tracheobronchomalacia in trachea or bronchial tubes    Rectal bleeding  exam under anesthesia ([facility]) 2018          MEDICATIONS  (STANDING):  apixaban 5 milliGRAM(s) Oral two times a day  atorvastatin 20 milliGRAM(s) Oral at bedtime  budesonide  80 MICROgram(s)/formoterol 4.5 MICROgram(s) Inhaler 2 Puff(s) Inhalation two times a day  chlorhexidine 2% Cloths 1 Application(s) Topical daily  dextrose 40% Gel 15 Gram(s) Oral once  dextrose 5%. 1000 milliLiter(s) (50 mL/Hr) IV Continuous <Continuous>  dextrose 5%. 1000 milliLiter(s) (100 mL/Hr) IV Continuous <Continuous>  dextrose 50% Injectable 25 Gram(s) IV Push once  dextrose 50% Injectable 12.5 Gram(s) IV Push once  dextrose 50% Injectable 25 Gram(s) IV Push once  fluticasone propionate 50 MICROgram(s)/spray Nasal Spray 1 Spray(s) Both Nostrils two times a day  glucagon  Injectable 1 milliGRAM(s) IntraMuscular once  insulin glargine Injectable (LANTUS) 15 Unit(s) SubCutaneous at bedtime  insulin lispro (ADMELOG) corrective regimen sliding scale   SubCutaneous three times a day before meals  pantoprazole    Tablet 40 milliGRAM(s) Oral before breakfast  roflumilast 250 MICROGram(s) Oral daily  tiotropium 18 MICROgram(s) Capsule 1 Capsule(s) Inhalation daily    MEDICATIONS  (PRN):  ALBUTerol    90 MICROgram(s) HFA Inhaler 2 Puff(s) Inhalation every 6 hours PRN Shortness of Breath and/or Wheezing      FAMILY HISTORY:  Family history of asthma  Family history of breast cancer (Sibling)  Family history of diabetes mellitus type II    SOCIAL HISTORY:  LIVING SITUATION: Lives with her sister  CIGARETTES: Denied  ALCOHOL: denied   ILLICIT DRUG USES: denied    REVIEW OF SYSTEMS:  CONSTITUTIONAL: No fever, weight loss, chills, shakes, or fatigue  EYES: No eye pain, visual disturbances, or discharge  ENMT:  No difficulty hearing, tinnitus, vertigo; No sinus or throat pain  NECK: No pain or stiffness  RESPIRATORY: per HPI  CARDIOVASCULAR: per HPI  GASTROINTESTINAL: No nausea, vomiting, hematemesis, diarrhea, constipation, melena or bright red blood. Admitted to bandlike tightness around the abdomin   GENITOURINARY: No dysuria, nocturia, hematuria, or urinary incontinence  NEUROLOGICAL: No headaches, memory loss, slurred speech, limb weakness, loss of strength, or tremors  MUSCULOSKELETAL: No joint pain or swelling, admitted to back pain and numbness in the legs      Vital Signs Last 24 Hrs  T(C): 36.8 (2021 08:21), Max: 36.8 (2021 08:21)  T(F): 98.2 (2021 08:21), Max: 98.2 (2021 08:21)  HR: 61 (2021 08:21) (61 - 78)  BP: 130/53 (2021 08:21) (130/53 - 155/50)  BP(mean): --  RR: 18 (2021 08:21) (18 - 21)  SpO2: 100% (2021 08:21) (99% - 100%)    PHYSICAL EXAM:  GENERAL: Sitting up in bed, tachypneic speaking in full sentence, in NAD  HEART: S1S2 RRR with a 1/6 murmur  PULMONARY Inspiratory wheezing b/l, normal respiratory effort.   ABDOMEN: Bowel sounds present, soft, NDNT  EXTREMITIES:  Warm, well -perfused, trace LE edema, distal pulses present  NEUROLOGICAL:AOx3    INTERPRETATION OF TELEMETRY: NSR with frequent PVC and bigeminy      ECG: SR with frequent PVC    I&O's Detail      LABS:                        11.7   7.40  )-----------( 254      ( 2021 05:20 )             38.3     04-21    139  |  105  |  20  ----------------------------<  101<H>  4.4   |  26  |  0.77    Ca    8.9      2021 05:20  Phos  3.6     04-21  Mg     2.2     21    TPro  6.7  /  Alb  3.7  /  TBili  <0.2  /  DBili  x   /  AST  17  /  ALT  15  /  AlkPhos  81  04-20            BNPSerum Pro-Brain Natriuretic Peptide: 340 pg/mL ( @ 16:55)    I&O's Detail    Daily     Daily     RADIOLOGY & ADDITIONAL STUDIES:    < from: TTE Echo Complete w/o Contrast w/ Doppler (21 @ 15:47) >    --------------------------------------------------------------------------------  CONCLUSIONS:     1. The aortic valve is tricuspid and mildly thickned. No hemodynamically significant aortic stenosis. There is moderate-to-severe aortic regurgitation.   2. No other significant valvular disease.   3. The right ventricle is normal in size. Right ventricular systolic function is normal.   4. There is mild concentric left ventricular hypertrophy. There are no regional wall motion abnormalities seen. Left ventricular systolic function is hyperdynamic with a calculated ejection fraction of >75%.   5. The aortic root is dilated measuring 4.00 cm.   6. No pericardial effusion.    --------------------------------------------------------------------------------  2D AND M-MODE MEASUREMENTS (normal ranges within parentheses):    Left Ventricle:         Normal - Men Normal - Women  IVSd (2D):      1.15 cm  (0.6-1.0)     (0.6-0.9)  LVPWd (2D):     1.14 cm  (0.6-1.0)     (0.6-0.9)  LVIDd (2D):     4.48 cm  (4.2-5.8)     (3.8-5.2)  LVIDs (2D):     2.81 cm  LV FS (2D):     37.3 %     (>25%)  LV EF (MOD BP):  78 %      (>55%)  Aorta/Left Atrium:        Normal - Men Normal - Women  Aortic Root (2D):  4.0 cm  (2.4-3.7)    LA Volume A/L:    Right Ventricle:    LV DIASTOLIC FUNCTION:    MV Peak E: 98.10 cm/s  MV e' lat:  4.2 cm/s MV E/e' lat ratio: 23.4  MV Peak A: 119.00 cm/s MV e' med:  3.9 cm/s MV E/e' med ratio: 25.2  E/A Ratio: 0.82        Decel Time: 338 msec MV E/e' av.3    SPECTRAL DOPPLER ANALYSIS:    Mitral Valve:  MV Max Stu:          MV P1/2 Time: 98.02 msec  MV Mean Grad: 2 mmHg MV Area, PHT: 2.24 cm²      Aortic Valve: AoV Max Stu:    2.00 m/s AoV Peak P mmHg  AoV Mean P mmHg  LVOT Vmax:    1.57 m/s LVOT VTI:      0.289 m  LVOT Diameter: 2.10 cm  AoV Area, VMax: 2.72 cm² AoV Area, VTI: 2.80 cm² AoV Area, Vmn: 2.44 cm²      Aortic Insufficiency:  AI Half-time:  470 msec  AI Decel Rate: 2.88 m/s²      Tricuspid Valve and PA/RV Systolic Pressure: TR Max Velocity:       RA Pressure: 8 mmHg  RVSP/PASP:  TAPSE:           17 mm RV S':       14 cm/s      --------------------------------------------------------------------------------  FINDINGS:    Left Ventricle:  There is mild concentric left ventricular hypertrophy. There are no regional wall motion abnormalities seen. Left ventricular systolic function is hyperdynamic with a calculated ejection fraction of >75%. Analysis of mitral annular tissue Doppler, left atrial volume index,and tricuspid regurgitant velocity reveals: indeterminate left ventricular diastolic function and filling pressure.    Right Ventricle:  The right ventricle is normal in size. Right ventricular systolic function is normal. The tricuspid annular planesystolic excursion (TAPSE) is 17.00 mm (normal >=17 mm). RV tissue Doppler S' is 14.00 cm/s (normal >10 cm/s).    Left Atrium:  The left atrium is normal in size.    Right Atrium:  The right atrium is normal in size.    Aortic Valve:  The aortic valve is tricuspid and mildly thickned. No hemodynamically significant aortic stenosis. The peak transvalvular velocity is 2.00 m/s, the mean transvalvular gradient is 12.00 mmHg, and the LVOT/AV velocity ratio is 0.81. There is moderate-to-severe aortic regurgitation.    Mitral Valve:  The mitral valve is minimally thickened. There is trace mitral regurgitation.    Tricuspid Valve:  Structurally normal tricuspid valve with normal leaflet excursion. There is trace tricuspid regurgitation. There was insufficient tricuspid regurgitation detected from which to calculate pulmonary artery systolic pressure.    Inferior Vena Cava:  The inferior vena cava is dilated (>2.1cm) with normal inspiratory collapse (>50%) consistent with mildly elevated right atrial pressure (  8, range 5-10mmHg).    Aorta:  The aortic root is dilated measuring 4.00 cm.    Pericardium:  No pericardial effusion is seen.    --------------------------------------------------------------------------------  Lyndsey Rangel MD    Electronically signed by Lyndsey Rangel MD  Signature Date/Time: :51:15 PM    < end of copied text >

## 2021-04-21 NOTE — PROGRESS NOTE ADULT - ASSESSMENT
70 year old female with hx of tracheobronchomalacia s/p tracheobronchoplasty, lung nodules, severe allergic asthma, adrenal insufficiency, bronchiectasis, DM II, HTN, CRC s/p colostomy, mod-severe AI and PAF (on Eliquis) now presenting with palpitations and dyspnea on exertion.

## 2021-04-21 NOTE — CONSULT NOTE ADULT - ATTENDING COMMENTS
As above.  Asthma, tracheobronchomalacia admitted with palpitations and shortness of breath.  Etiology seems to be cardiac.  Can complete short course of prednisone 40 mg and inhalers as listed above.

## 2021-04-21 NOTE — CONSULT NOTE ADULT - ASSESSMENT
This is a 70 year old woman with a pmhx of mod-severe AI and PAF (on Eliquis), tracheobronchomalacia s/p tracheobronchoplasty, lung nodules, severe allergic asthma, adrenal insufficiency, bronchiectasis, T2DM, HTN, CRC s/p colostomy who presented to [facility] ED from pulmonary rehab due to worsening SOB and palpitation. According to the patient, this is different from her typical asthma exacerbation and stated that  her palpitations lead to her sob. Patient believes that she went into" Afib " on Saturday  based on her worsening symptoms. There is no EKG or telemetric strip showing this. She also stated that the palpitation was associated with a band-like tightness around her abdomen, weakness, and dizziness which she has been experiencing for the past year. Pulmonology started her on prednisone 40mg x 5 days given possible asthma exacerbation with plans to restart home medrol after. Ep was consulted for Afib and bradycardia. Upon my evaluation, patient was in SR with frequent PVC and bigeminy HR 70-90s.     - Continuous telemetric monitoring for PVC burden, Afib and bradycardia  - Monitor electrolytes and replete K to 4 and Mg to 2  - Continue Eliquis 5mg po qd for thromboembolic ppx as patient has a [id number] score of 4 (Age, Sex, HTN, T2DM)  - Obtain a TTE   - Continue home diltiazem 30mg po BID and monitor HR and BP  - Appreciate pulmonology recs  - Continue care per primary team     Silvia Bailey PA-C  Patient to be staffed with attending. Please await attending addendum

## 2021-04-22 ENCOUNTER — APPOINTMENT (OUTPATIENT)
Dept: PULMONOLOGY | Facility: CLINIC | Age: 71
End: 2021-04-22

## 2021-04-22 LAB
ANION GAP SERPL CALC-SCNC: 10 MMOL/L — SIGNIFICANT CHANGE UP (ref 7–14)
BUN SERPL-MCNC: 23 MG/DL — SIGNIFICANT CHANGE UP (ref 7–23)
CALCIUM SERPL-MCNC: 8.9 MG/DL — SIGNIFICANT CHANGE UP (ref 8.4–10.5)
CHLORIDE SERPL-SCNC: 104 MMOL/L — SIGNIFICANT CHANGE UP (ref 98–107)
CO2 SERPL-SCNC: 28 MMOL/L — SIGNIFICANT CHANGE UP (ref 22–31)
CREAT SERPL-MCNC: 0.8 MG/DL — SIGNIFICANT CHANGE UP (ref 0.5–1.3)
GLUCOSE BLDC GLUCOMTR-MCNC: 155 MG/DL — HIGH (ref 70–99)
GLUCOSE BLDC GLUCOMTR-MCNC: 158 MG/DL — HIGH (ref 70–99)
GLUCOSE BLDC GLUCOMTR-MCNC: 184 MG/DL — HIGH (ref 70–99)
GLUCOSE BLDC GLUCOMTR-MCNC: 96 MG/DL — SIGNIFICANT CHANGE UP (ref 70–99)
GLUCOSE SERPL-MCNC: 189 MG/DL — HIGH (ref 70–99)
HCT VFR BLD CALC: 38.7 % — SIGNIFICANT CHANGE UP (ref 34.5–45)
HGB BLD-MCNC: 12 G/DL — SIGNIFICANT CHANGE UP (ref 11.5–15.5)
MAGNESIUM SERPL-MCNC: 1.9 MG/DL — SIGNIFICANT CHANGE UP (ref 1.6–2.6)
MCHC RBC-ENTMCNC: 22.9 PG — LOW (ref 27–34)
MCHC RBC-ENTMCNC: 31 GM/DL — LOW (ref 32–36)
MCV RBC AUTO: 73.7 FL — LOW (ref 80–100)
NRBC # BLD: 0 /100 WBCS — SIGNIFICANT CHANGE UP
NRBC # FLD: 0 K/UL — SIGNIFICANT CHANGE UP
PHOSPHATE SERPL-MCNC: 3.8 MG/DL — SIGNIFICANT CHANGE UP (ref 2.5–4.5)
PLATELET # BLD AUTO: 290 K/UL — SIGNIFICANT CHANGE UP (ref 150–400)
POTASSIUM SERPL-MCNC: 3.5 MMOL/L — SIGNIFICANT CHANGE UP (ref 3.5–5.3)
POTASSIUM SERPL-SCNC: 3.5 MMOL/L — SIGNIFICANT CHANGE UP (ref 3.5–5.3)
RBC # BLD: 5.25 M/UL — HIGH (ref 3.8–5.2)
RBC # FLD: 17.5 % — HIGH (ref 10.3–14.5)
SODIUM SERPL-SCNC: 142 MMOL/L — SIGNIFICANT CHANGE UP (ref 135–145)
WBC # BLD: 6.89 K/UL — SIGNIFICANT CHANGE UP (ref 3.8–10.5)
WBC # FLD AUTO: 6.89 K/UL — SIGNIFICANT CHANGE UP (ref 3.8–10.5)

## 2021-04-22 PROCEDURE — 99233 SBSQ HOSP IP/OBS HIGH 50: CPT

## 2021-04-22 PROCEDURE — 99232 SBSQ HOSP IP/OBS MODERATE 35: CPT

## 2021-04-22 RX ORDER — DILTIAZEM HCL 120 MG
30 CAPSULE, EXT RELEASE 24 HR ORAL EVERY 12 HOURS
Refills: 0 | Status: DISCONTINUED | OUTPATIENT
Start: 2021-04-22 | End: 2021-04-29

## 2021-04-22 RX ORDER — POTASSIUM CHLORIDE 20 MEQ
40 PACKET (EA) ORAL ONCE
Refills: 0 | Status: COMPLETED | OUTPATIENT
Start: 2021-04-22 | End: 2021-04-22

## 2021-04-22 RX ORDER — POLYETHYLENE GLYCOL 3350 17 G/17G
17 POWDER, FOR SOLUTION ORAL DAILY
Refills: 0 | Status: DISCONTINUED | OUTPATIENT
Start: 2021-04-22 | End: 2021-04-29

## 2021-04-22 RX ADMIN — Medication 1: at 17:38

## 2021-04-22 RX ADMIN — ATORVASTATIN CALCIUM 20 MILLIGRAM(S): 80 TABLET, FILM COATED ORAL at 21:39

## 2021-04-22 RX ADMIN — APIXABAN 5 MILLIGRAM(S): 2.5 TABLET, FILM COATED ORAL at 17:57

## 2021-04-22 RX ADMIN — Medication 30 MILLIGRAM(S): at 17:57

## 2021-04-22 RX ADMIN — ALBUTEROL 2 PUFF(S): 90 AEROSOL, METERED ORAL at 21:40

## 2021-04-22 RX ADMIN — POLYETHYLENE GLYCOL 3350 17 GRAM(S): 17 POWDER, FOR SOLUTION ORAL at 10:28

## 2021-04-22 RX ADMIN — Medication 4 MILLIGRAM(S): at 11:19

## 2021-04-22 RX ADMIN — CHLORHEXIDINE GLUCONATE 1 APPLICATION(S): 213 SOLUTION TOPICAL at 13:22

## 2021-04-22 RX ADMIN — APIXABAN 5 MILLIGRAM(S): 2.5 TABLET, FILM COATED ORAL at 06:01

## 2021-04-22 RX ADMIN — BUDESONIDE AND FORMOTEROL FUMARATE DIHYDRATE 2 PUFF(S): 160; 4.5 AEROSOL RESPIRATORY (INHALATION) at 09:08

## 2021-04-22 RX ADMIN — Medication 40 MILLIEQUIVALENT(S): at 17:57

## 2021-04-22 RX ADMIN — BUDESONIDE AND FORMOTEROL FUMARATE DIHYDRATE 2 PUFF(S): 160; 4.5 AEROSOL RESPIRATORY (INHALATION) at 21:47

## 2021-04-22 RX ADMIN — INSULIN GLARGINE 15 UNIT(S): 100 INJECTION, SOLUTION SUBCUTANEOUS at 21:47

## 2021-04-22 RX ADMIN — Medication 1: at 13:14

## 2021-04-22 RX ADMIN — ROFLUMILAST 250 MICROGRAM(S): 500 TABLET ORAL at 13:15

## 2021-04-22 RX ADMIN — PANTOPRAZOLE SODIUM 40 MILLIGRAM(S): 20 TABLET, DELAYED RELEASE ORAL at 06:01

## 2021-04-22 RX ADMIN — TIOTROPIUM BROMIDE 1 CAPSULE(S): 18 CAPSULE ORAL; RESPIRATORY (INHALATION) at 09:08

## 2021-04-22 NOTE — PROGRESS NOTE ADULT - SUBJECTIVE AND OBJECTIVE BOX
Hospitalist Progress Note  Authored by: Isabel Willis MD pager # [phone]    OVERNIGHT EVENTS: ERIKA. No events on telemetry. PVCs present.     SUBJECTIVE / INTERVAL HPI: Patient seen and examined at bedside. Pt reports refusing prednisone because it does not work well for her. Requesting to be on her usual home dose of medrol. She also says that she knows her lungs are not the issue and she thinks her SOB and symptoms are from her heart    VITAL SIGNS:  Vital Signs Last 24 Hrs  T(C): 36.4 (22 Apr 2021 13:41), Max: 36.9 (21 Apr 2021 16:35)  T(F): 97.6 (22 Apr 2021 13:41), Max: 98.4 (21 Apr 2021 16:35)  HR: 74 (22 Apr 2021 13:41) (70 - 76)  BP: 138/58 (22 Apr 2021 13:41) (126/60 - 144/60)  BP(mean): --  RR: 17 (22 Apr 2021 13:41) (17 - 18)  SpO2: 100% (22 Apr 2021 13:41) (100% - 100%)    PHYSICAL EXAM:    General: WDWN female resting comfortably in bed   HEENT: NC/AT; PERRL, anicteric sclera; MMM  Neck: supple  Cardiovascular: +S1/S2; RRR  Respiratory: CTA B/L; no W/R/R  Gastrointestinal: soft, NT/ND; +BSx4. Ostomy pink with soft stool.   Extremities: WWP; no edema, clubbing or cyanosis  Vascular: 2+ radial, DP/PT pulses B/L  Neurological: AAOx3; no focal deficits    MEDICATIONS:  MEDICATIONS  (STANDING):  apixaban 5 milliGRAM(s) Oral two times a day  atorvastatin 20 milliGRAM(s) Oral at bedtime  budesonide  80 MICROgram(s)/formoterol 4.5 MICROgram(s) Inhaler 2 Puff(s) Inhalation two times a day  chlorhexidine 2% Cloths 1 Application(s) Topical daily  dextrose 40% Gel 15 Gram(s) Oral once  dextrose 5%. 1000 milliLiter(s) (50 mL/Hr) IV Continuous <Continuous>  dextrose 5%. 1000 milliLiter(s) (100 mL/Hr) IV Continuous <Continuous>  dextrose 50% Injectable 25 Gram(s) IV Push once  dextrose 50% Injectable 12.5 Gram(s) IV Push once  dextrose 50% Injectable 25 Gram(s) IV Push once  diltiazem    Tablet 30 milliGRAM(s) Oral every 12 hours  fluticasone propionate 50 MICROgram(s)/spray Nasal Spray 1 Spray(s) Both Nostrils two times a day  glucagon  Injectable 1 milliGRAM(s) IntraMuscular once  insulin glargine Injectable (LANTUS) 15 Unit(s) SubCutaneous at bedtime  insulin lispro (ADMELOG) corrective regimen sliding scale   SubCutaneous three times a day before meals  methylPREDNISolone 4 milliGRAM(s) Oral daily  pantoprazole    Tablet 40 milliGRAM(s) Oral before breakfast  polyethylene glycol 3350 17 Gram(s) Oral daily  potassium chloride    Tablet ER 40 milliEquivalent(s) Oral once  roflumilast 250 MICROGram(s) Oral daily  tiotropium 18 MICROgram(s) Capsule 1 Capsule(s) Inhalation daily    MEDICATIONS  (PRN):  ALBUTerol    90 MICROgram(s) HFA Inhaler 2 Puff(s) Inhalation every 6 hours PRN Shortness of Breath and/or Wheezing      ALLERGIES:  Allergies    ampicillin (Short breath)  aspirin (Short breath)  Avelox (Short breath; Pruritus)  codeine (Short breath)  Dilaudid (Short breath)  iodine (Short breath; Swelling)  penicillin (Short breath)  shellfish (Anaphylaxis)  tetanus toxoid (Short breath)  Valium (Short breath)    Intolerances        LABS:                        12.0   6.89  )-----------( 290      ( 22 Apr 2021 12:11 )             38.7     04-22    142  |  104  |  23  ----------------------------<  189<H>  3.5   |  28  |  0.80    Ca    8.9      22 Apr 2021 12:11  Phos  3.8     04-22  Mg     1.9     04-22    TPro  6.7  /  Alb  3.7  /  TBili  <0.2  /  DBili  x   /  AST  17  /  ALT  15  /  AlkPhos  81  04-20        CAPILLARY BLOOD GLUCOSE      POCT Blood Glucose.: 158 mg/dL (22 Apr 2021 12:33)      RADIOLOGY & ADDITIONAL TESTS: Reviewed.    ASSESSMENT:    PLAN:

## 2021-04-22 NOTE — PROGRESS NOTE ADULT - SUBJECTIVE AND OBJECTIVE BOX
Subjective: Denies HA, lightheadedness, dizziness, CP, palpitation, SOB, abdominal pain, and N/V.      Interval events:  - p/w with SOB and palpitation which was associated with weakness, bandlike tightness around the abdomin and dizziness   - Pulm was consulted and patient was started on prednisone for possible asthma excerbation   - EP was consulted to Afib and bradycardia. Patient was in NSR with frequent PVC and Bigmeny on the ED telemetry.  - On telemetry overnight and this morning patient was SR with few PVCS and her symptoms has resolved    MEDICATIONS  (STANDING):  apixaban 5 milliGRAM(s) Oral two times a day  atorvastatin 20 milliGRAM(s) Oral at bedtime  budesonide  80 MICROgram(s)/formoterol 4.5 MICROgram(s) Inhaler 2 Puff(s) Inhalation two times a day  chlorhexidine 2% Cloths 1 Application(s) Topical daily  dextrose 40% Gel 15 Gram(s) Oral once  dextrose 5%. 1000 milliLiter(s) (50 mL/Hr) IV Continuous <Continuous>  dextrose 5%. 1000 milliLiter(s) (100 mL/Hr) IV Continuous <Continuous>  dextrose 50% Injectable 25 Gram(s) IV Push once  dextrose 50% Injectable 12.5 Gram(s) IV Push once  dextrose 50% Injectable 25 Gram(s) IV Push once  diltiazem    Tablet 30 milliGRAM(s) Oral every 12 hours  fluticasone propionate 50 MICROgram(s)/spray Nasal Spray 1 Spray(s) Both Nostrils two times a day  glucagon  Injectable 1 milliGRAM(s) IntraMuscular once  insulin glargine Injectable (LANTUS) 15 Unit(s) SubCutaneous at bedtime  insulin lispro (ADMELOG) corrective regimen sliding scale   SubCutaneous three times a day before meals  pantoprazole    Tablet 40 milliGRAM(s) Oral before breakfast  predniSONE   Tablet 40 milliGRAM(s) Oral daily  roflumilast 250 MICROGram(s) Oral daily  tiotropium 18 MICROgram(s) Capsule 1 Capsule(s) Inhalation daily    MEDICATIONS  (PRN):  ALBUTerol    90 MICROgram(s) HFA Inhaler 2 Puff(s) Inhalation every 6 hours PRN Shortness of Breath and/or Wheezing      Vital Signs Last 24 Hrs  T(C): 36.9 (22 Apr 2021 05:56), Max: 36.9 (21 Apr 2021 16:35)  T(F): 98.4 (22 Apr 2021 05:56), Max: 98.4 (21 Apr 2021 16:35)  HR: 76 (22 Apr 2021 05:56) (70 - 76)  BP: 144/60 (22 Apr 2021 05:56) (126/60 - 144/60)  BP(mean): --  RR: 18 (22 Apr 2021 05:56) (18 - 18)  SpO2: 100% (22 Apr 2021 05:56) (100% - 100%)  I&O's Detail      PHYSICAL EXAM:  GENERAL: lying in bed, following command, speaking in full sentences, in NAD  HEART: S1S2 RRR with a 1/6 murmur  PULMONARY CTABL, normal respiratory effort.   ABDOMEN: Bowel sounds present, soft, NDNT  EXTREMITIES:  Warm, well -perfused, no pedal edema, distal pulses present  NEUROLOGICAL:AOx3    INTERPRETATION OF TELEMETRY: SR HR Mid to high 50 while sleep and 60-70 when awake with few PVCs                        11.7   7.40  )-----------( 254      ( 21 Apr 2021 05:20 )             38.3       04-21    139  |  105  |  20  ----------------------------<  101<H>  4.4   |  26  |  0.77    Ca    8.9      21 Apr 2021 05:20  Phos  3.6     04-21  Mg     2.2     04-21    TPro  6.7  /  Alb  3.7  /  TBili  <0.2  /  DBili  x   /  AST  17  /  ALT  15  /  AlkPhos  81  04-20      < from: TTE Echo Complete w/o Contrast w/ Doppler (01.04.21 @ 15:47) >    --------------------------------------------------------------------------------  CONCLUSIONS:     1. The aortic valve is tricuspid and mildly thickned. No hemodynamically significant aortic stenosis. There is moderate-to-severe aortic regurgitation.   2. No other significant valvular disease.   3. The right ventricle is normal in size. Right ventricular systolic function is normal.   4. There is mild concentric left ventricular hypertrophy. There are no regional wall motion abnormalities seen. Left ventricular systolic function is hyperdynamic with a calculated ejection fraction of >75%.   5. The aortic root is dilated measuring 4.00 cm.   6. No pericardial effusion.

## 2021-04-22 NOTE — PROGRESS NOTE ADULT - ASSESSMENT
This is a 70 year old woman with a pmhx of mod-severe AI and PAF (on Eliquis), tracheobronchomalacia s/p tracheobronchoplasty, lung nodules, severe allergic asthma, adrenal insufficiency, bronchiectasis, T2DM, HTN, CRC s/p colostomy who presented to [facility] ED from pulmonary rehab due to worsening SOB and palpitation. According to the patient, this is different from her typical asthma exacerbation and stated that  her palpitations lead to her sob. Patient believes that she went into" Afib " on Saturday  based on her worsening symptoms. There is no EKG or telemetric strip showing this. She also stated that the palpitation was associated with a band-like tightness around her abdomen, weakness, and dizziness which she has been experiencing for the past year. Pulmonology started her on prednisone 40mg x 5 days given possible asthma exacerbation with plans to restart home medrol after. Ep was consulted for Afib and bradycardia. Upon my evaluation, patient was in SR with frequent PVC and bigeminy HR 70-90s.    Plan:  - Continuous telemetric monitoring for PVC burden, Afib and bradycardia  - Monitor electrolytes and replete K to 4 and Mg to 2  - Continue Eliquis 5mg po qd for thromboembolic ppx as patient has a [id number] score of 4 (Age, Sex, HTN, T2DM)  - Obtain TTE   - Continue home diltiazem 30mg po BID and monitor HR and BP  - Appreciate pulmonology recs  - Continue care per primary team     Silvia Bailey PA-C  Patient to be staffed with attending. Please await attending addendum   This is a 70 year old woman with a pmhx of mod-severe AI and PAF (on Eliquis), tracheobronchomalacia s/p tracheobronchoplasty, lung nodules, severe allergic asthma, adrenal insufficiency, bronchiectasis, T2DM, HTN, CRC s/p colostomy who presented to [facility] ED from pulmonary rehab due to worsening SOB and palpitation. According to the patient, this is different from her typical asthma exacerbation and stated that  her palpitations lead to her sob. Patient believes that she went into" Afib " on Saturday  based on her worsening symptoms. There is no EKG or telemetric strip showing this. She also stated that the palpitation was associated with a band-like tightness around her abdomen, weakness, and dizziness which she has been experiencing for the past year. Pulmonology started her on prednisone 40mg x 5 days given possible asthma exacerbation with plans to restart home medrol after. Ep was consulted for Afib and bradycardia. Upon my evaluation, patient was in SR with frequent PVC and bigeminy HR 70-90s.    Plan:  - Continuous telemetric monitoring for PVC burden, Afib and bradycardia  - Monitor electrolytes and replete K to 4 and Mg to 2  - Continue Eliquis 5mg po qd for thromboembolic ppx as patient has a [id number] score of 4 (Age, Sex, HTN, T2DM)  - Obtain TTE   - Continue home diltiazem 30mg po BID and monitor HR and BP  - Start mexiletine 200mg po BID  - Appreciate pulmonology recs  - Continue care per primary team     Silvia Bailey PA-C  Patient to be staffed with attending. Please await attending addendum

## 2021-04-23 ENCOUNTER — NON-APPOINTMENT (OUTPATIENT)
Age: 71
End: 2021-04-23

## 2021-04-23 LAB
ANION GAP SERPL CALC-SCNC: 5 MMOL/L — LOW (ref 7–14)
BUN SERPL-MCNC: 20 MG/DL — SIGNIFICANT CHANGE UP (ref 7–23)
CALCIUM SERPL-MCNC: 9 MG/DL — SIGNIFICANT CHANGE UP (ref 8.4–10.5)
CHLORIDE SERPL-SCNC: 106 MMOL/L — SIGNIFICANT CHANGE UP (ref 98–107)
CO2 SERPL-SCNC: 29 MMOL/L — SIGNIFICANT CHANGE UP (ref 22–31)
CREAT SERPL-MCNC: 0.84 MG/DL — SIGNIFICANT CHANGE UP (ref 0.5–1.3)
GLUCOSE BLDC GLUCOMTR-MCNC: 166 MG/DL — HIGH (ref 70–99)
GLUCOSE BLDC GLUCOMTR-MCNC: 176 MG/DL — HIGH (ref 70–99)
GLUCOSE BLDC GLUCOMTR-MCNC: 200 MG/DL — HIGH (ref 70–99)
GLUCOSE BLDC GLUCOMTR-MCNC: 58 MG/DL — LOW (ref 70–99)
GLUCOSE BLDC GLUCOMTR-MCNC: 72 MG/DL — SIGNIFICANT CHANGE UP (ref 70–99)
GLUCOSE SERPL-MCNC: 65 MG/DL — LOW (ref 70–99)
HCT VFR BLD CALC: 38.9 % — SIGNIFICANT CHANGE UP (ref 34.5–45)
HGB BLD-MCNC: 12.1 G/DL — SIGNIFICANT CHANGE UP (ref 11.5–15.5)
MAGNESIUM SERPL-MCNC: 2.3 MG/DL — SIGNIFICANT CHANGE UP (ref 1.6–2.6)
MCHC RBC-ENTMCNC: 22.9 PG — LOW (ref 27–34)
MCHC RBC-ENTMCNC: 31.1 GM/DL — LOW (ref 32–36)
MCV RBC AUTO: 73.7 FL — LOW (ref 80–100)
NRBC # BLD: 0 /100 WBCS — SIGNIFICANT CHANGE UP
NRBC # FLD: 0 K/UL — SIGNIFICANT CHANGE UP
PHOSPHATE SERPL-MCNC: 4 MG/DL — SIGNIFICANT CHANGE UP (ref 2.5–4.5)
PLATELET # BLD AUTO: 285 K/UL — SIGNIFICANT CHANGE UP (ref 150–400)
POTASSIUM SERPL-MCNC: 3.9 MMOL/L — SIGNIFICANT CHANGE UP (ref 3.5–5.3)
POTASSIUM SERPL-SCNC: 3.9 MMOL/L — SIGNIFICANT CHANGE UP (ref 3.5–5.3)
RBC # BLD: 5.28 M/UL — HIGH (ref 3.8–5.2)
RBC # FLD: 17.6 % — HIGH (ref 10.3–14.5)
SODIUM SERPL-SCNC: 140 MMOL/L — SIGNIFICANT CHANGE UP (ref 135–145)
WBC # BLD: 7.31 K/UL — SIGNIFICANT CHANGE UP (ref 3.8–10.5)
WBC # FLD AUTO: 7.31 K/UL — SIGNIFICANT CHANGE UP (ref 3.8–10.5)

## 2021-04-23 PROCEDURE — 99232 SBSQ HOSP IP/OBS MODERATE 35: CPT | Mod: GC

## 2021-04-23 PROCEDURE — 99233 SBSQ HOSP IP/OBS HIGH 50: CPT

## 2021-04-23 PROCEDURE — 99232 SBSQ HOSP IP/OBS MODERATE 35: CPT

## 2021-04-23 PROCEDURE — 93306 TTE W/DOPPLER COMPLETE: CPT | Mod: 26

## 2021-04-23 RX ORDER — ONDANSETRON 8 MG/1
4 TABLET, FILM COATED ORAL ONCE
Refills: 0 | Status: COMPLETED | OUTPATIENT
Start: 2021-04-23 | End: 2021-04-23

## 2021-04-23 RX ORDER — MEXILETINE HYDROCHLORIDE 150 MG/1
200 CAPSULE ORAL
Refills: 0 | Status: DISCONTINUED | OUTPATIENT
Start: 2021-04-23 | End: 2021-04-26

## 2021-04-23 RX ADMIN — Medication 1: at 17:56

## 2021-04-23 RX ADMIN — Medication 100 MILLIGRAM(S): at 17:56

## 2021-04-23 RX ADMIN — Medication 1: at 12:54

## 2021-04-23 RX ADMIN — INSULIN GLARGINE 15 UNIT(S): 100 INJECTION, SOLUTION SUBCUTANEOUS at 22:35

## 2021-04-23 RX ADMIN — ROFLUMILAST 250 MICROGRAM(S): 500 TABLET ORAL at 10:48

## 2021-04-23 RX ADMIN — ONDANSETRON 4 MILLIGRAM(S): 8 TABLET, FILM COATED ORAL at 22:04

## 2021-04-23 RX ADMIN — BUDESONIDE AND FORMOTEROL FUMARATE DIHYDRATE 2 PUFF(S): 160; 4.5 AEROSOL RESPIRATORY (INHALATION) at 22:05

## 2021-04-23 RX ADMIN — MEXILETINE HYDROCHLORIDE 200 MILLIGRAM(S): 150 CAPSULE ORAL at 13:54

## 2021-04-23 RX ADMIN — PANTOPRAZOLE SODIUM 40 MILLIGRAM(S): 20 TABLET, DELAYED RELEASE ORAL at 05:39

## 2021-04-23 RX ADMIN — Medication 5 MILLIGRAM(S): at 10:48

## 2021-04-23 RX ADMIN — TIOTROPIUM BROMIDE 1 CAPSULE(S): 18 CAPSULE ORAL; RESPIRATORY (INHALATION) at 09:08

## 2021-04-23 RX ADMIN — APIXABAN 5 MILLIGRAM(S): 2.5 TABLET, FILM COATED ORAL at 17:56

## 2021-04-23 RX ADMIN — Medication 30 MILLIGRAM(S): at 09:08

## 2021-04-23 RX ADMIN — APIXABAN 5 MILLIGRAM(S): 2.5 TABLET, FILM COATED ORAL at 05:39

## 2021-04-23 RX ADMIN — ATORVASTATIN CALCIUM 20 MILLIGRAM(S): 80 TABLET, FILM COATED ORAL at 22:05

## 2021-04-23 RX ADMIN — Medication 30 MILLIGRAM(S): at 22:05

## 2021-04-23 RX ADMIN — MEXILETINE HYDROCHLORIDE 200 MILLIGRAM(S): 150 CAPSULE ORAL at 22:04

## 2021-04-23 RX ADMIN — POLYETHYLENE GLYCOL 3350 17 GRAM(S): 17 POWDER, FOR SOLUTION ORAL at 10:48

## 2021-04-23 RX ADMIN — BUDESONIDE AND FORMOTEROL FUMARATE DIHYDRATE 2 PUFF(S): 160; 4.5 AEROSOL RESPIRATORY (INHALATION) at 09:08

## 2021-04-23 RX ADMIN — CHLORHEXIDINE GLUCONATE 1 APPLICATION(S): 213 SOLUTION TOPICAL at 10:48

## 2021-04-23 RX ADMIN — Medication 4 MILLIGRAM(S): at 05:39

## 2021-04-23 NOTE — PROGRESS NOTE ADULT - SUBJECTIVE AND OBJECTIVE BOX
Hospitalist Progress Note  Authored by: Isabel Willis MD pager # [phone]    OVERNIGHT EVENTS: ERIKA     SUBJECTIVE / INTERVAL HPI: Patient seen and examined at bedside. Comfortable. No complaints. Continues to have palpitations and SOB with exertion. Reports mild wheezing. Normal BMs through the ostomy. All other ROS negative.     VITAL SIGNS:  Vital Signs Last 24 Hrs  T(C): 37 (23 Apr 2021 05:32), Max: 37 (23 Apr 2021 05:32)  T(F): 98.6 (23 Apr 2021 05:32), Max: 98.6 (23 Apr 2021 05:32)  HR: 69 (23 Apr 2021 09:06) (60 - 75)  BP: 148/52 (23 Apr 2021 09:06) (132/61 - 148/52)  BP(mean): --  RR: 17 (23 Apr 2021 09:06) (16 - 17)  SpO2: 100% (23 Apr 2021 09:06) (98% - 100%)    PHYSICAL EXAM:    General: WDWN  HEENT: NC/AT; PERRL, anicteric sclera; MMM  Neck: supple  Cardiovascular: +S1/S2; RRR  Respiratory: CTA B/L; no W/R/R  Gastrointestinal: soft, NT/ND; +BSx4. Ostomy pink with normal stool.   Extremities: WWP; no edema, clubbing or cyanosis  Vascular: 2+ radial, DP/PT pulses B/L  Neurological: AAOx3; no focal deficits    MEDICATIONS:  MEDICATIONS  (STANDING):  apixaban 5 milliGRAM(s) Oral two times a day  atorvastatin 20 milliGRAM(s) Oral at bedtime  budesonide  80 MICROgram(s)/formoterol 4.5 MICROgram(s) Inhaler 2 Puff(s) Inhalation two times a day  chlorhexidine 2% Cloths 1 Application(s) Topical daily  dextrose 40% Gel 15 Gram(s) Oral once  dextrose 5%. 1000 milliLiter(s) (50 mL/Hr) IV Continuous <Continuous>  dextrose 5%. 1000 milliLiter(s) (100 mL/Hr) IV Continuous <Continuous>  dextrose 50% Injectable 25 Gram(s) IV Push once  dextrose 50% Injectable 12.5 Gram(s) IV Push once  dextrose 50% Injectable 25 Gram(s) IV Push once  diltiazem    Tablet 30 milliGRAM(s) Oral every 12 hours  fluticasone propionate 50 MICROgram(s)/spray Nasal Spray 1 Spray(s) Both Nostrils two times a day  glucagon  Injectable 1 milliGRAM(s) IntraMuscular once  insulin glargine Injectable (LANTUS) 15 Unit(s) SubCutaneous at bedtime  insulin lispro (ADMELOG) corrective regimen sliding scale   SubCutaneous three times a day before meals  methylPREDNISolone 4 milliGRAM(s) Oral daily  mexiletine 200 milliGRAM(s) Oral two times a day  pantoprazole    Tablet 40 milliGRAM(s) Oral before breakfast  polyethylene glycol 3350 17 Gram(s) Oral daily  roflumilast 250 MICROGram(s) Oral daily  tiotropium 18 MICROgram(s) Capsule 1 Capsule(s) Inhalation daily    MEDICATIONS  (PRN):  ALBUTerol    90 MICROgram(s) HFA Inhaler 2 Puff(s) Inhalation every 6 hours PRN Shortness of Breath and/or Wheezing  bisacodyl 5 milliGRAM(s) Oral daily PRN Constipation      ALLERGIES:  Allergies    ampicillin (Short breath)  aspirin (Short breath)  Avelox (Short breath; Pruritus)  codeine (Short breath)  Dilaudid (Short breath)  iodine (Short breath; Swelling)  penicillin (Short breath)  shellfish (Anaphylaxis)  tetanus toxoid (Short breath)  Valium (Short breath)    Intolerances        LABS:                        12.1   7.31  )-----------( 285      ( 23 Apr 2021 07:12 )             38.9     04-23    140  |  106  |  20  ----------------------------<  65<L>  3.9   |  29  |  0.84    Ca    9.0      23 Apr 2021 07:12  Phos  4.0     04-23  Mg     2.3     04-23          CAPILLARY BLOOD GLUCOSE      POCT Blood Glucose.: 176 mg/dL (23 Apr 2021 12:11)      RADIOLOGY & ADDITIONAL TESTS: Reviewed.    ASSESSMENT:    PLAN:

## 2021-04-23 NOTE — PROGRESS NOTE ADULT - SUBJECTIVE AND OBJECTIVE BOX
Subjective: Occasionally feels band like thighness around her stomach, and palpitation Denies HA, lightheadedness, dizziness, CP, and SOB.      Interval events:  - p/w with SOB and palpitation which was associated with weakness, bandlike tightness around the abdomin and dizziness   - Pulm was consulted and patient was started on prednisone for possible asthma exacerbation   - EP was consulted to Afib and bradycardia. Patient was in NSR with frequent PVC and Bigeminy on the ED telemetry.  - TTE revealed EF of 61%Normal left ventricular systolic function, no WMA, mildly dilated LA, moderate AR  - Patient continues to have PVC and Bigeminy which correlates with her symptom, EP recommends starting mexiletine 200mg po BID    MEDICATIONS  (STANDING):  apixaban 5 milliGRAM(s) Oral two times a day  atorvastatin 20 milliGRAM(s) Oral at bedtime  budesonide  80 MICROgram(s)/formoterol 4.5 MICROgram(s) Inhaler 2 Puff(s) Inhalation two times a day  chlorhexidine 2% Cloths 1 Application(s) Topical daily  dextrose 40% Gel 15 Gram(s) Oral once  dextrose 5%. 1000 milliLiter(s) (50 mL/Hr) IV Continuous <Continuous>  dextrose 5%. 1000 milliLiter(s) (100 mL/Hr) IV Continuous <Continuous>  dextrose 50% Injectable 25 Gram(s) IV Push once  dextrose 50% Injectable 12.5 Gram(s) IV Push once  dextrose 50% Injectable 25 Gram(s) IV Push once  diltiazem    Tablet 30 milliGRAM(s) Oral every 12 hours  fluticasone propionate 50 MICROgram(s)/spray Nasal Spray 1 Spray(s) Both Nostrils two times a day  glucagon  Injectable 1 milliGRAM(s) IntraMuscular once  insulin glargine Injectable (LANTUS) 15 Unit(s) SubCutaneous at bedtime  insulin lispro (ADMELOG) corrective regimen sliding scale   SubCutaneous three times a day before meals  methylPREDNISolone 4 milliGRAM(s) Oral daily  mexiletine 200 milliGRAM(s) Oral two times a day  pantoprazole    Tablet 40 milliGRAM(s) Oral before breakfast  polyethylene glycol 3350 17 Gram(s) Oral daily  roflumilast 250 MICROGram(s) Oral daily  tiotropium 18 MICROgram(s) Capsule 1 Capsule(s) Inhalation daily    MEDICATIONS  (PRN):  ALBUTerol    90 MICROgram(s) HFA Inhaler 2 Puff(s) Inhalation every 6 hours PRN Shortness of Breath and/or Wheezing  bisacodyl 5 milliGRAM(s) Oral daily PRN Constipation      Vital Signs Last 24 Hrs  T(C): 37 (23 Apr 2021 05:32), Max: 37 (23 Apr 2021 05:32)  T(F): 98.6 (23 Apr 2021 05:32), Max: 98.6 (23 Apr 2021 05:32)  HR: 69 (23 Apr 2021 09:06) (60 - 75)  BP: 148/52 (23 Apr 2021 09:06) (132/61 - 148/52)  BP(mean): --  RR: 17 (23 Apr 2021 09:06) (16 - 17)  SpO2: 100% (23 Apr 2021 09:06) (98% - 100%)  I&O's Detail    23 Apr 2021 07:01  -  23 Apr 2021 12:25  --------------------------------------------------------  IN:  Total IN: 0 mL    OUT:    Voided (mL): 450 mL  Total OUT: 450 mL    Total NET: -450 mL    PHYSICAL EXAM:  GENERAL: lying in bed, following command, speaking in full sentences, in NAD  HEART: S1S2 RRR with a 1/6 murmur  PULMONARY CTABL, normal respiratory effort.   ABDOMEN: Bowel sounds present, soft, NDNT  EXTREMITIES:  Warm, well -perfused, no pedal edema, distal pulses present  NEUROLOGICAL:AOx3    INTERPRETATION OF TELEMETRY: SR HR [id number] with PVC and bigeminy                         12.1   7.31  )-----------( 285      ( 23 Apr 2021 07:12 )             38.9       04-23    140  |  106  |  20  ----------------------------<  65<L>  3.9   |  29  |  0.84    Ca    9.0      23 Apr 2021 07:12  Phos  4.0     04-23  Mg     2.3     04-23    < from: TTE Echo Complete w/o Contrast w/ Doppler (01.04.21 @ 15:47) >    --------------------------------------------------------------------------------  CONCLUSIONS:     1. The aortic valve is tricuspid and mildly thickned. No hemodynamically significant aortic stenosis. There is moderate-to-severe aortic regurgitation.   2. No other significant valvular disease.   3. The right ventricle is normal in size. Right ventricular systolic function is normal.   4. There is mild concentric left ventricular hypertrophy. There are no regional wall motion abnormalities seen. Left ventricular systolic function is hyperdynamic with a calculated ejection fraction of >75%.   5. The aortic root is dilated measuring 4.00 cm.   6. No pericardial effusion.      < from: Transthoracic Echocardiogram (04.23.21 @ 09:26) >  DIMENSIONS:  Dimensions:     Normal Values:  LA:     4.1 cm    2.0 - 4.0 cm  Ao:     3.3 cm    2.0 - 3.8 cm  SEPTUM: 0.7 cm    0.6 - 1.2 cm  PWT:    0.7 cm0.6 - 1.1 cm  LVIDd:  4.0 cm    3.0 - 5.6 cm  LVIDs:  2.7 cm    1.8 - 4.0 cm  Derived Variables:  LVMI: 45 g/m2  RWT: 0.35  Fractional short: 33 %  Ejection Fraction (Teicholtz): 61 %  ------------------------------------------------------------------------  OBSERVATIONS:  Mitral Valve: Mitral annular calcification, otherwise  normal mitral valve. Minimal mitral regurgitation.  Aortic Root: Normal aortic root.  Aortic Valve: Calcified trileaflet aortic valve with normal  opening. Moderate aortic regurgitation.  Vena contracta  width about  0.4 cm.  Left Atrium: Mildly dilated left atrium.  LA volume index =  35 cc/m2.  Left Ventricle: Normal left ventricular systolic function.  No segmental wall motion abnormalities. Normal left  ventricular internal dimensions and wall thicknesses. Mild  diastolic dysfunction (Stage I).  Right Heart: Normal right atrium. Normal right ventricular  size and function. Normal tricuspid valve. Minimal  tricuspid regurgitation. Normal pulmonic valve. Minimal  pulmonic regurgitation.  Pericardium/PleuraNormal pericardium with no pericardial  effusion.  ------------------------------------------------------------------------  CONCLUSIONS:  1. Mitral annular calcification, otherwise normal mitral  valve. Minimal mitral regurgitation.  2. Calcified trileaflet aortic valve with normal opening.  Moderate aortic regurgitation.  Vena contracta width about  0.4 cm.  3. Mildly dilated left atrium.  LA volume index = 35 cc/m2.  4. Normal left ventricular internaldimensions and wall  thicknesses.  5. Normal left ventricular systolic function. No segmental  wall motion abnormalities.  6. Mild diastolic dysfunction (Stage I).  7. Normal right ventricular size and function.  ------------------------------------------------------------------------  Confirmed on  4/23/2021 - 10:42:27 by Sergio Arizmendi M.D.,  [facility], [place]  ----------------------------------------------------------    < end of copied text >         Subjective: Occasionally feels band like thighness around her stomach, and palpitation Denies HA, lightheadedness, dizziness, CP, and SOB.      Interval events:  - p/w with SOB and palpitation which was associated with weakness, bandlike tightness around the abdomin and dizziness   - Pulm was consulted and patient was started on prednisone for possible asthma exacerbation   - EP was consulted to Afib and bradycardia. Patient was in NSR with frequent PVC and Bigeminy on the ED telemetry.  - TTE revealed EF of 61%Normal left ventricular systolic function, no WMA, mildly dilated LA, moderate AR  - Per pulmonary note patient refused prednisone as patient prefers medro  - Patient continues to have PVC and Bigeminy which correlates with her symptom, EP recommends starting mexiletine 200mg po BID    MEDICATIONS  (STANDING):  apixaban 5 milliGRAM(s) Oral two times a day  atorvastatin 20 milliGRAM(s) Oral at bedtime  budesonide  80 MICROgram(s)/formoterol 4.5 MICROgram(s) Inhaler 2 Puff(s) Inhalation two times a day  chlorhexidine 2% Cloths 1 Application(s) Topical daily  dextrose 40% Gel 15 Gram(s) Oral once  dextrose 5%. 1000 milliLiter(s) (50 mL/Hr) IV Continuous <Continuous>  dextrose 5%. 1000 milliLiter(s) (100 mL/Hr) IV Continuous <Continuous>  dextrose 50% Injectable 25 Gram(s) IV Push once  dextrose 50% Injectable 12.5 Gram(s) IV Push once  dextrose 50% Injectable 25 Gram(s) IV Push once  diltiazem    Tablet 30 milliGRAM(s) Oral every 12 hours  fluticasone propionate 50 MICROgram(s)/spray Nasal Spray 1 Spray(s) Both Nostrils two times a day  glucagon  Injectable 1 milliGRAM(s) IntraMuscular once  insulin glargine Injectable (LANTUS) 15 Unit(s) SubCutaneous at bedtime  insulin lispro (ADMELOG) corrective regimen sliding scale   SubCutaneous three times a day before meals  methylPREDNISolone 4 milliGRAM(s) Oral daily  mexiletine 200 milliGRAM(s) Oral two times a day  pantoprazole    Tablet 40 milliGRAM(s) Oral before breakfast  polyethylene glycol 3350 17 Gram(s) Oral daily  roflumilast 250 MICROGram(s) Oral daily  tiotropium 18 MICROgram(s) Capsule 1 Capsule(s) Inhalation daily    MEDICATIONS  (PRN):  ALBUTerol    90 MICROgram(s) HFA Inhaler 2 Puff(s) Inhalation every 6 hours PRN Shortness of Breath and/or Wheezing  bisacodyl 5 milliGRAM(s) Oral daily PRN Constipation      Vital Signs Last 24 Hrs  T(C): 37 (23 Apr 2021 05:32), Max: 37 (23 Apr 2021 05:32)  T(F): 98.6 (23 Apr 2021 05:32), Max: 98.6 (23 Apr 2021 05:32)  HR: 69 (23 Apr 2021 09:06) (60 - 75)  BP: 148/52 (23 Apr 2021 09:06) (132/61 - 148/52)  BP(mean): --  RR: 17 (23 Apr 2021 09:06) (16 - 17)  SpO2: 100% (23 Apr 2021 09:06) (98% - 100%)  I&O's Detail    23 Apr 2021 07:01  -  23 Apr 2021 12:25  --------------------------------------------------------  IN:  Total IN: 0 mL    OUT:    Voided (mL): 450 mL  Total OUT: 450 mL    Total NET: -450 mL    PHYSICAL EXAM:  GENERAL: lying in bed, following command, speaking in full sentences, in NAD  HEART: S1S2 RRR with a 1/6 murmur  PULMONARY CTABL, normal respiratory effort.   ABDOMEN: Bowel sounds present, soft, NDNT  EXTREMITIES:  Warm, well -perfused, no pedal edema, distal pulses present  NEUROLOGICAL:AOx3    INTERPRETATION OF TELEMETRY: SR HR [id number] with PVC and bigeminy                         12.1   7.31  )-----------( 285      ( 23 Apr 2021 07:12 )             38.9       04-23    140  |  106  |  20  ----------------------------<  65<L>  3.9   |  29  |  0.84    Ca    9.0      23 Apr 2021 07:12  Phos  4.0     04-23  Mg     2.3     04-23    < from: TTE Echo Complete w/o Contrast w/ Doppler (01.04.21 @ 15:47) >    --------------------------------------------------------------------------------  CONCLUSIONS:     1. The aortic valve is tricuspid and mildly thickned. No hemodynamically significant aortic stenosis. There is moderate-to-severe aortic regurgitation.   2. No other significant valvular disease.   3. The right ventricle is normal in size. Right ventricular systolic function is normal.   4. There is mild concentric left ventricular hypertrophy. There are no regional wall motion abnormalities seen. Left ventricular systolic function is hyperdynamic with a calculated ejection fraction of >75%.   5. The aortic root is dilated measuring 4.00 cm.   6. No pericardial effusion.      < from: Transthoracic Echocardiogram (04.23.21 @ 09:26) >  DIMENSIONS:  Dimensions:     Normal Values:  LA:     4.1 cm    2.0 - 4.0 cm  Ao:     3.3 cm    2.0 - 3.8 cm  SEPTUM: 0.7 cm    0.6 - 1.2 cm  PWT:    0.7 cm0.6 - 1.1 cm  LVIDd:  4.0 cm    3.0 - 5.6 cm  LVIDs:  2.7 cm    1.8 - 4.0 cm  Derived Variables:  LVMI: 45 g/m2  RWT: 0.35  Fractional short: 33 %  Ejection Fraction (Teicholtz): 61 %  ------------------------------------------------------------------------  OBSERVATIONS:  Mitral Valve: Mitral annular calcification, otherwise  normal mitral valve. Minimal mitral regurgitation.  Aortic Root: Normal aortic root.  Aortic Valve: Calcified trileaflet aortic valve with normal  opening. Moderate aortic regurgitation.  Vena contracta  width about  0.4 cm.  Left Atrium: Mildly dilated left atrium.  LA volume index =  35 cc/m2.  Left Ventricle: Normal left ventricular systolic function.  No segmental wall motion abnormalities. Normal left  ventricular internal dimensions and wall thicknesses. Mild  diastolic dysfunction (Stage I).  Right Heart: Normal right atrium. Normal right ventricular  size and function. Normal tricuspid valve. Minimal  tricuspid regurgitation. Normal pulmonic valve. Minimal  pulmonic regurgitation.  Pericardium/PleuraNormal pericardium with no pericardial  effusion.  ------------------------------------------------------------------------  CONCLUSIONS:  1. Mitral annular calcification, otherwise normal mitral  valve. Minimal mitral regurgitation.  2. Calcified trileaflet aortic valve with normal opening.  Moderate aortic regurgitation.  Vena contracta width about  0.4 cm.  3. Mildly dilated left atrium.  LA volume index = 35 cc/m2.  4. Normal left ventricular internaldimensions and wall  thicknesses.  5. Normal left ventricular systolic function. No segmental  wall motion abnormalities.  6. Mild diastolic dysfunction (Stage I).  7. Normal right ventricular size and function.  ------------------------------------------------------------------------  Confirmed on  4/23/2021 - 10:42:27 by Sergio Arizmendi M.D.,  [facility], [place]  ----------------------------------------------------------    < end of copied text >

## 2021-04-23 NOTE — PROGRESS NOTE ADULT - ASSESSMENT
This is a 70 year old woman with a pmhx of mod-severe AI and PAF (on Eliquis), tracheobronchomalacia s/p tracheobronchoplasty, lung nodules, severe allergic asthma, adrenal insufficiency, bronchiectasis, T2DM, HTN, CRC s/p colostomy who presented to [facility] ED from pulmonary rehab due to worsening SOB and palpitation. According to the patient, this is different from her typical asthma exacerbation and stated that  her palpitations lead to her sob. Patient believes that she went into" Afib " on Saturday  based on her worsening symptoms. There is no EKG or telemetric strip showing this. She also stated that the palpitation was associated with a band-like tightness around her abdomen, weakness, and dizziness which she has been experiencing for the past year. Pulmonology started her on prednisone 40mg x 5 days given possible asthma exacerbation with plans to restart home medrol after. Ep was consulted for Afib and bradycardia. Upon my evaluation, patient was in SR with frequent PVC and bigeminy HR 70-90s. Patient continues to have PVC and Bigeminy which correlates with her symptom, EP recommends starting mexiletine 200mg po BID    Plan:  - Continuous telemetric monitoring for PVC burden, Afib and bradycardia  - Monitor electrolytes and replete K to 4 and Mg to 2  - Continue Eliquis 5mg po qd for thromboembolic ppx as patient has a [id number] score of 4 (Age, Sex, HTN, T2DM)  - TTE revealed EF of 61%Normal left ventricular systolic function, no WMA, mildly dilated LA, moderate AR  - Continue home diltiazem 30mg po BID and monitor HR and BP  - Start mexiletine 200mg po BID, patient was given education material on the medication  - Appreciate pulmonology recs  - Continue care per primary team     Silvia Bailey PA-C  Patient to be staffed with attending. Please await attending addendum

## 2021-04-23 NOTE — CHART NOTE - NSCHARTNOTEFT_GEN_A_CORE
Called by RN re: pt complaining of being nauseas.  Pt denies any abdominal discomfort, vomiting, chest pain, palpitations, sob.     Vital Signs Last 24 Hrs  T(C): 36.8 (23 Apr 2021 13:46), Max: 37 (23 Apr 2021 05:32)  T(F): 98.3 (23 Apr 2021 13:46), Max: 98.6 (23 Apr 2021 05:32)  HR: 76 (23 Apr 2021 22:03) (60 - 83)  BP: 134/80 (23 Apr 2021 22:03) (134/80 - 148/52)  BP(mean): --  RR: 18 (23 Apr 2021 13:46) (16 - 18)  SpO2: 100% (23 Apr 2021 13:46) (98% - 100%)    zofran 4mg iv given x 1  will continue to monitor

## 2021-04-23 NOTE — PROGRESS NOTE ADULT - SUBJECTIVE AND OBJECTIVE BOX
Interval Events: Patient was seen and examined at bedside. No overnight events.    REVIEW OF SYSTEMS:  Constitutional: [ ] fevers [ ] chills [ ] weight loss [ ] weight gain  CV: [ ] chest pain [ ] orthopnea [ ] palpitations [ ] murmur  Resp: [ ] cough [ ] shortness of breath [ ] dyspnea [ ] wheezing [ ] sputum [ ] hemoptysis  [ ] All other systems negative  [ ] Unable to assess ROS because ________    OBJECTIVE:  ICU Vital Signs Last 24 Hrs  T(C): 37 (23 Apr 2021 05:32), Max: 37 (23 Apr 2021 05:32)  T(F): 98.6 (23 Apr 2021 05:32), Max: 98.6 (23 Apr 2021 05:32)  HR: 60 (23 Apr 2021 05:32) (60 - 75)  BP: 135/62 (23 Apr 2021 05:32) (132/61 - 138/58)  BP(mean): --  ABP: --  ABP(mean): --  RR: 16 (23 Apr 2021 05:32) (16 - 17)  SpO2: 98% (23 Apr 2021 05:32) (98% - 100%)        CAPILLARY BLOOD GLUCOSE      POCT Blood Glucose.: 155 mg/dL (22 Apr 2021 21:46)      PHYSICAL EXAM:        HOSPITAL MEDICATIONS:  MEDICATIONS  (STANDING):  apixaban 5 milliGRAM(s) Oral two times a day  atorvastatin 20 milliGRAM(s) Oral at bedtime  budesonide  80 MICROgram(s)/formoterol 4.5 MICROgram(s) Inhaler 2 Puff(s) Inhalation two times a day  chlorhexidine 2% Cloths 1 Application(s) Topical daily  dextrose 40% Gel 15 Gram(s) Oral once  dextrose 5%. 1000 milliLiter(s) (50 mL/Hr) IV Continuous <Continuous>  dextrose 5%. 1000 milliLiter(s) (100 mL/Hr) IV Continuous <Continuous>  dextrose 50% Injectable 25 Gram(s) IV Push once  dextrose 50% Injectable 12.5 Gram(s) IV Push once  dextrose 50% Injectable 25 Gram(s) IV Push once  diltiazem    Tablet 30 milliGRAM(s) Oral every 12 hours  fluticasone propionate 50 MICROgram(s)/spray Nasal Spray 1 Spray(s) Both Nostrils two times a day  glucagon  Injectable 1 milliGRAM(s) IntraMuscular once  insulin glargine Injectable (LANTUS) 15 Unit(s) SubCutaneous at bedtime  insulin lispro (ADMELOG) corrective regimen sliding scale   SubCutaneous three times a day before meals  methylPREDNISolone 4 milliGRAM(s) Oral daily  pantoprazole    Tablet 40 milliGRAM(s) Oral before breakfast  polyethylene glycol 3350 17 Gram(s) Oral daily  roflumilast 250 MICROGram(s) Oral daily  tiotropium 18 MICROgram(s) Capsule 1 Capsule(s) Inhalation daily    MEDICATIONS  (PRN):  ALBUTerol    90 MICROgram(s) HFA Inhaler 2 Puff(s) Inhalation every 6 hours PRN Shortness of Breath and/or Wheezing      LABS:                        12.1   7.31  )-----------( 285      ( 23 Apr 2021 07:12 )             38.9     Hgb Trend: 12.1<--, 12.0<--, 11.7<--, 11.4<--  04-22    142  |  104  |  23  ----------------------------<  189<H>  3.5   |  28  |  0.80    Ca    8.9      22 Apr 2021 12:11  Phos  3.8     04-22  Mg     1.9     04-22      Creatinine Trend: 0.80<--, 0.77<--, 0.68<--          MICROBIOLOGY:     RADIOLOGY:  [ ] Reviewed and interpreted by me   Interval Events: Patient was seen and examined at bedside. No overnight events.    Patient is doing well. Refused prednisone as patient prefers medrol. But currently not wheezing.     REVIEW OF SYSTEMS:  Constitutional: [ ] fevers [ ] chills [ ] weight loss [ ] weight gain  CV: [ ] chest pain [ ] orthopnea [ ] palpitations [ ] murmur  Resp: [ ] cough [ ] shortness of breath [ ] dyspnea [ ] wheezing [ ] sputum [ ] hemoptysis  [ x] All other systems negative  [ ] Unable to assess ROS because ________    OBJECTIVE:  ICU Vital Signs Last 24 Hrs  T(C): 37 (23 Apr 2021 05:32), Max: 37 (23 Apr 2021 05:32)  T(F): 98.6 (23 Apr 2021 05:32), Max: 98.6 (23 Apr 2021 05:32)  HR: 60 (23 Apr 2021 05:32) (60 - 75)  BP: 135/62 (23 Apr 2021 05:32) (132/61 - 138/58)  BP(mean): --  ABP: --  ABP(mean): --  RR: 16 (23 Apr 2021 05:32) (16 - 17)  SpO2: 98% (23 Apr 2021 05:32) (98% - 100%)        CAPILLARY BLOOD GLUCOSE      POCT Blood Glucose.: 155 mg/dL (22 Apr 2021 21:46)    PHYSICAL EXAM:    Constitutional: well-developed; well-groomed; well-nourished; no distress,  Eyes: PERRL; EOMI  ENMT: Normal oropharnxy,  Neck:  Supple; no JVD;   Respiratory: airway patent; no wheezing  Cardiovascular: regular rate and rhythm  no rub , no murmur, no gallops.   Gastrointestinal: soft; no distention, normal BS, no TTP, no organomegaly, no ascites.  Extremities: no clubbing; no cyanosis; no pedal edema  Neurological: alert and oriented x 3;  Skin: warm and dry; color normal: no rash: no ulcers  Psychiatric: Calm, no SI/HI      HOSPITAL MEDICATIONS:  MEDICATIONS  (STANDING):  apixaban 5 milliGRAM(s) Oral two times a day  atorvastatin 20 milliGRAM(s) Oral at bedtime  budesonide  80 MICROgram(s)/formoterol 4.5 MICROgram(s) Inhaler 2 Puff(s) Inhalation two times a day  chlorhexidine 2% Cloths 1 Application(s) Topical daily  dextrose 40% Gel 15 Gram(s) Oral once  dextrose 5%. 1000 milliLiter(s) (50 mL/Hr) IV Continuous <Continuous>  dextrose 5%. 1000 milliLiter(s) (100 mL/Hr) IV Continuous <Continuous>  dextrose 50% Injectable 25 Gram(s) IV Push once  dextrose 50% Injectable 12.5 Gram(s) IV Push once  dextrose 50% Injectable 25 Gram(s) IV Push once  diltiazem    Tablet 30 milliGRAM(s) Oral every 12 hours  fluticasone propionate 50 MICROgram(s)/spray Nasal Spray 1 Spray(s) Both Nostrils two times a day  glucagon  Injectable 1 milliGRAM(s) IntraMuscular once  insulin glargine Injectable (LANTUS) 15 Unit(s) SubCutaneous at bedtime  insulin lispro (ADMELOG) corrective regimen sliding scale   SubCutaneous three times a day before meals  methylPREDNISolone 4 milliGRAM(s) Oral daily  pantoprazole    Tablet 40 milliGRAM(s) Oral before breakfast  polyethylene glycol 3350 17 Gram(s) Oral daily  roflumilast 250 MICROGram(s) Oral daily  tiotropium 18 MICROgram(s) Capsule 1 Capsule(s) Inhalation daily    MEDICATIONS  (PRN):  ALBUTerol    90 MICROgram(s) HFA Inhaler 2 Puff(s) Inhalation every 6 hours PRN Shortness of Breath and/or Wheezing      LABS:                        12.1   7.31  )-----------( 285      ( 23 Apr 2021 07:12 )             38.9     Hgb Trend: 12.1<--, 12.0<--, 11.7<--, 11.4<--  04-22    142  |  104  |  23  ----------------------------<  189<H>  3.5   |  28  |  0.80    Ca    8.9      22 Apr 2021 12:11  Phos  3.8     04-22  Mg     1.9     04-22      Creatinine Trend: 0.80<--, 0.77<--, 0.68<--    MICROBIOLOGY:     RADIOLOGY:  [ ] Reviewed and interpreted by me

## 2021-04-23 NOTE — PROGRESS NOTE ADULT - ASSESSMENT
70 Y.o. female with pmh of Asthma, severe persistent on Dupixent TBM s/p laser tracheoplasty, possible MM, allergies, adrenal insufficiency on medrol 4mg who presents with palpations, sob, kramer. Patient had an episode of tachy-cade episode in the ED with SOB.    # Palpitations, symptomatic tachy-cade.   # Severe persistent asthma on immunotherapy  # SOB/KRAMER  - States this episode is not similar to her previous asthma exacerbation Eso 150, unclear when next dose of dupixent is due  - Can c/w home dose od medrol.   - Albuterol q6 hours, Symbicort BID,  Spiriva daily. At discharge patient can go back on home meds. Will need follow up with Dr. Allison for Dupixent.  - Flonase BID.   - C/W daliresp  - Continue with workup for palpitations and KRAMER  - Patient can follow up with Dr. Allison on discharge.   - Will sign off at this time.    Dae Hyeon Kim [id number]  Pulmonary Critical Care Fellow  Pager [phone]

## 2021-04-24 LAB
ANION GAP SERPL CALC-SCNC: 11 MMOL/L — SIGNIFICANT CHANGE UP (ref 7–14)
BUN SERPL-MCNC: 17 MG/DL — SIGNIFICANT CHANGE UP (ref 7–23)
CALCIUM SERPL-MCNC: 8.9 MG/DL — SIGNIFICANT CHANGE UP (ref 8.4–10.5)
CHLORIDE SERPL-SCNC: 106 MMOL/L — SIGNIFICANT CHANGE UP (ref 98–107)
CO2 SERPL-SCNC: 25 MMOL/L — SIGNIFICANT CHANGE UP (ref 22–31)
CREAT SERPL-MCNC: 0.81 MG/DL — SIGNIFICANT CHANGE UP (ref 0.5–1.3)
GLUCOSE BLDC GLUCOMTR-MCNC: 134 MG/DL — HIGH (ref 70–99)
GLUCOSE BLDC GLUCOMTR-MCNC: 164 MG/DL — HIGH (ref 70–99)
GLUCOSE BLDC GLUCOMTR-MCNC: 190 MG/DL — HIGH (ref 70–99)
GLUCOSE BLDC GLUCOMTR-MCNC: 223 MG/DL — HIGH (ref 70–99)
GLUCOSE SERPL-MCNC: 110 MG/DL — HIGH (ref 70–99)
HCT VFR BLD CALC: 36.1 % — SIGNIFICANT CHANGE UP (ref 34.5–45)
HGB BLD-MCNC: 11 G/DL — LOW (ref 11.5–15.5)
MAGNESIUM SERPL-MCNC: 2.1 MG/DL — SIGNIFICANT CHANGE UP (ref 1.6–2.6)
MCHC RBC-ENTMCNC: 22.7 PG — LOW (ref 27–34)
MCHC RBC-ENTMCNC: 30.5 GM/DL — LOW (ref 32–36)
MCV RBC AUTO: 74.6 FL — LOW (ref 80–100)
NRBC # BLD: 0 /100 WBCS — SIGNIFICANT CHANGE UP
NRBC # FLD: 0 K/UL — SIGNIFICANT CHANGE UP
PHOSPHATE SERPL-MCNC: 3.7 MG/DL — SIGNIFICANT CHANGE UP (ref 2.5–4.5)
PLATELET # BLD AUTO: 242 K/UL — SIGNIFICANT CHANGE UP (ref 150–400)
POTASSIUM SERPL-MCNC: 4 MMOL/L — SIGNIFICANT CHANGE UP (ref 3.5–5.3)
POTASSIUM SERPL-SCNC: 4 MMOL/L — SIGNIFICANT CHANGE UP (ref 3.5–5.3)
RBC # BLD: 4.84 M/UL — SIGNIFICANT CHANGE UP (ref 3.8–5.2)
RBC # FLD: 17.2 % — HIGH (ref 10.3–14.5)
SODIUM SERPL-SCNC: 142 MMOL/L — SIGNIFICANT CHANGE UP (ref 135–145)
WBC # BLD: 7.78 K/UL — SIGNIFICANT CHANGE UP (ref 3.8–10.5)
WBC # FLD AUTO: 7.78 K/UL — SIGNIFICANT CHANGE UP (ref 3.8–10.5)

## 2021-04-24 PROCEDURE — 99233 SBSQ HOSP IP/OBS HIGH 50: CPT

## 2021-04-24 RX ORDER — ONDANSETRON 8 MG/1
4 TABLET, FILM COATED ORAL EVERY 6 HOURS
Refills: 0 | Status: DISCONTINUED | OUTPATIENT
Start: 2021-04-24 | End: 2021-04-29

## 2021-04-24 RX ORDER — CALAMINE AND ZINC OXIDE AND PHENOL 160; 10 MG/ML; MG/ML
1 LOTION TOPICAL DAILY
Refills: 0 | Status: DISCONTINUED | OUTPATIENT
Start: 2021-04-24 | End: 2021-04-29

## 2021-04-24 RX ADMIN — APIXABAN 5 MILLIGRAM(S): 2.5 TABLET, FILM COATED ORAL at 18:05

## 2021-04-24 RX ADMIN — ROFLUMILAST 250 MICROGRAM(S): 500 TABLET ORAL at 11:13

## 2021-04-24 RX ADMIN — Medication 5 MILLIGRAM(S): at 11:23

## 2021-04-24 RX ADMIN — ATORVASTATIN CALCIUM 20 MILLIGRAM(S): 80 TABLET, FILM COATED ORAL at 21:09

## 2021-04-24 RX ADMIN — POLYETHYLENE GLYCOL 3350 17 GRAM(S): 17 POWDER, FOR SOLUTION ORAL at 11:12

## 2021-04-24 RX ADMIN — MEXILETINE HYDROCHLORIDE 200 MILLIGRAM(S): 150 CAPSULE ORAL at 05:40

## 2021-04-24 RX ADMIN — Medication 2: at 13:08

## 2021-04-24 RX ADMIN — MEXILETINE HYDROCHLORIDE 200 MILLIGRAM(S): 150 CAPSULE ORAL at 18:05

## 2021-04-24 RX ADMIN — INSULIN GLARGINE 15 UNIT(S): 100 INJECTION, SOLUTION SUBCUTANEOUS at 21:09

## 2021-04-24 RX ADMIN — BUDESONIDE AND FORMOTEROL FUMARATE DIHYDRATE 2 PUFF(S): 160; 4.5 AEROSOL RESPIRATORY (INHALATION) at 11:12

## 2021-04-24 RX ADMIN — Medication 30 MILLIGRAM(S): at 18:05

## 2021-04-24 RX ADMIN — BUDESONIDE AND FORMOTEROL FUMARATE DIHYDRATE 2 PUFF(S): 160; 4.5 AEROSOL RESPIRATORY (INHALATION) at 21:09

## 2021-04-24 RX ADMIN — CHLORHEXIDINE GLUCONATE 1 APPLICATION(S): 213 SOLUTION TOPICAL at 11:13

## 2021-04-24 RX ADMIN — Medication 100 MILLIGRAM(S): at 00:04

## 2021-04-24 RX ADMIN — TIOTROPIUM BROMIDE 1 CAPSULE(S): 18 CAPSULE ORAL; RESPIRATORY (INHALATION) at 11:12

## 2021-04-24 RX ADMIN — APIXABAN 5 MILLIGRAM(S): 2.5 TABLET, FILM COATED ORAL at 05:40

## 2021-04-24 RX ADMIN — Medication 1: at 18:04

## 2021-04-24 RX ADMIN — PANTOPRAZOLE SODIUM 40 MILLIGRAM(S): 20 TABLET, DELAYED RELEASE ORAL at 05:40

## 2021-04-24 RX ADMIN — Medication 4 MILLIGRAM(S): at 05:40

## 2021-04-24 NOTE — PROGRESS NOTE ADULT - SUBJECTIVE AND OBJECTIVE BOX
Patient is a 70y old  Female who presents with a chief complaint of Dyspnea on exertion (23 Apr 2021 13:45)      SUBJECTIVE / OVERNIGHT EVENTS: Pt generally feeling well. Did have belching and nausea with medication earlier.    MEDICATIONS  (STANDING):  apixaban 5 milliGRAM(s) Oral two times a day  atorvastatin 20 milliGRAM(s) Oral at bedtime  budesonide  80 MICROgram(s)/formoterol 4.5 MICROgram(s) Inhaler 2 Puff(s) Inhalation two times a day  chlorhexidine 2% Cloths 1 Application(s) Topical daily  dextrose 40% Gel 15 Gram(s) Oral once  dextrose 5%. 1000 milliLiter(s) (50 mL/Hr) IV Continuous <Continuous>  dextrose 5%. 1000 milliLiter(s) (100 mL/Hr) IV Continuous <Continuous>  dextrose 50% Injectable 25 Gram(s) IV Push once  dextrose 50% Injectable 12.5 Gram(s) IV Push once  dextrose 50% Injectable 25 Gram(s) IV Push once  diltiazem    Tablet 30 milliGRAM(s) Oral every 12 hours  fluticasone propionate 50 MICROgram(s)/spray Nasal Spray 1 Spray(s) Both Nostrils two times a day  glucagon  Injectable 1 milliGRAM(s) IntraMuscular once  insulin glargine Injectable (LANTUS) 15 Unit(s) SubCutaneous at bedtime  insulin lispro (ADMELOG) corrective regimen sliding scale   SubCutaneous three times a day before meals  methylPREDNISolone 4 milliGRAM(s) Oral daily  mexiletine 200 milliGRAM(s) Oral two times a day  pantoprazole    Tablet 40 milliGRAM(s) Oral before breakfast  polyethylene glycol 3350 17 Gram(s) Oral daily  roflumilast 250 MICROGram(s) Oral daily  tiotropium 18 MICROgram(s) Capsule 1 Capsule(s) Inhalation daily    MEDICATIONS  (PRN):  ALBUTerol    90 MICROgram(s) HFA Inhaler 2 Puff(s) Inhalation every 6 hours PRN Shortness of Breath and/or Wheezing  bisacodyl 5 milliGRAM(s) Oral daily PRN Constipation  guaiFENesin   Syrup  (Sugar-Free) 100 milliGRAM(s) Oral every 6 hours PRN Cough  ondansetron Injectable 4 milliGRAM(s) IV Push every 6 hours PRN Nausea and/or Vomiting      CAPILLARY BLOOD GLUCOSE      POCT Blood Glucose.: 134 mg/dL (24 Apr 2021 08:12)  POCT Blood Glucose.: 200 mg/dL (23 Apr 2021 22:30)  POCT Blood Glucose.: 166 mg/dL (23 Apr 2021 17:35)  POCT Blood Glucose.: 176 mg/dL (23 Apr 2021 12:11)    I&O's Summary    23 Apr 2021 07:01  -  24 Apr 2021 07:00  --------------------------------------------------------  IN: 0 mL / OUT: 450 mL / NET: -450 mL        PHYSICAL EXAM:  Vital Signs Last 24 Hrs  T(C): 36.9 (24 Apr 2021 11:09), Max: 36.9 (23 Apr 2021 21:10)  T(F): 98.5 (24 Apr 2021 11:09), Max: 98.5 (23 Apr 2021 21:10)  HR: 67 (24 Apr 2021 11:09) (62 - 83)  BP: 114/49 (24 Apr 2021 11:09) (114/49 - 141/57)  BP(mean): --  RR: 17 (24 Apr 2021 11:09) (17 - 18)  SpO2: 100% (24 Apr 2021 11:09) (100% - 100%)  CONSTITUTIONAL: NAD, well-developed, well-groomed  EYES: PERRLA; conjunctiva and sclera clear  ENMT: Moist oral mucosa, no pharyngeal injection or exudates; normal dentition  NECK: Supple, no palpable masses; no thyromegaly  RESPIRATORY: Normal respiratory effort; lungs are clear to auscultation bilaterally  CARDIOVASCULAR: Regular rate and rhythm, normal S1 and S2, no murmur/rub/gallop; No lower extremity edema; Peripheral pulses are 2+ bilaterally  ABDOMEN: Nontender to palpation, normoactive bowel sounds, no rebound/guarding; No hepatosplenomegaly      LABS:                        11.0   7.78  )-----------( 242      ( 24 Apr 2021 06:21 )             36.1     04-24    142  |  106  |  17  ----------------------------<  110<H>  4.0   |  25  |  0.81    Ca    8.9      24 Apr 2021 06:21  Phos  3.7     04-24  Mg     2.1     04-24                  RADIOLOGY & ADDITIONAL TESTS:  Results Reviewed:   Imaging Personally Reviewed:  Electrocardiogram Personally Reviewed:    COORDINATION OF CARE:  Care Discussed with Consultants/Other Providers [Y/N]:  Prior or Outpatient Records Reviewed [Y/N]:

## 2021-04-25 LAB
ANION GAP SERPL CALC-SCNC: 11 MMOL/L — SIGNIFICANT CHANGE UP (ref 7–14)
BUN SERPL-MCNC: 17 MG/DL — SIGNIFICANT CHANGE UP (ref 7–23)
CALCIUM SERPL-MCNC: 8.9 MG/DL — SIGNIFICANT CHANGE UP (ref 8.4–10.5)
CHLORIDE SERPL-SCNC: 105 MMOL/L — SIGNIFICANT CHANGE UP (ref 98–107)
CO2 SERPL-SCNC: 25 MMOL/L — SIGNIFICANT CHANGE UP (ref 22–31)
CREAT SERPL-MCNC: 0.86 MG/DL — SIGNIFICANT CHANGE UP (ref 0.5–1.3)
GLUCOSE BLDC GLUCOMTR-MCNC: 109 MG/DL — HIGH (ref 70–99)
GLUCOSE BLDC GLUCOMTR-MCNC: 111 MG/DL — HIGH (ref 70–99)
GLUCOSE BLDC GLUCOMTR-MCNC: 233 MG/DL — HIGH (ref 70–99)
GLUCOSE BLDC GLUCOMTR-MCNC: 237 MG/DL — HIGH (ref 70–99)
GLUCOSE SERPL-MCNC: 98 MG/DL — SIGNIFICANT CHANGE UP (ref 70–99)
HCT VFR BLD CALC: 34.7 % — SIGNIFICANT CHANGE UP (ref 34.5–45)
HGB BLD-MCNC: 10.9 G/DL — LOW (ref 11.5–15.5)
MAGNESIUM SERPL-MCNC: 2.1 MG/DL — SIGNIFICANT CHANGE UP (ref 1.6–2.6)
MCHC RBC-ENTMCNC: 23.2 PG — LOW (ref 27–34)
MCHC RBC-ENTMCNC: 31.4 GM/DL — LOW (ref 32–36)
MCV RBC AUTO: 73.8 FL — LOW (ref 80–100)
NRBC # BLD: 0 /100 WBCS — SIGNIFICANT CHANGE UP
NRBC # FLD: 0 K/UL — SIGNIFICANT CHANGE UP
PHOSPHATE SERPL-MCNC: 3.6 MG/DL — SIGNIFICANT CHANGE UP (ref 2.5–4.5)
PLATELET # BLD AUTO: 266 K/UL — SIGNIFICANT CHANGE UP (ref 150–400)
POTASSIUM SERPL-MCNC: 4.1 MMOL/L — SIGNIFICANT CHANGE UP (ref 3.5–5.3)
POTASSIUM SERPL-SCNC: 4.1 MMOL/L — SIGNIFICANT CHANGE UP (ref 3.5–5.3)
RBC # BLD: 4.7 M/UL — SIGNIFICANT CHANGE UP (ref 3.8–5.2)
RBC # FLD: 17.3 % — HIGH (ref 10.3–14.5)
SODIUM SERPL-SCNC: 141 MMOL/L — SIGNIFICANT CHANGE UP (ref 135–145)
WBC # BLD: 6.35 K/UL — SIGNIFICANT CHANGE UP (ref 3.8–10.5)
WBC # FLD AUTO: 6.35 K/UL — SIGNIFICANT CHANGE UP (ref 3.8–10.5)

## 2021-04-25 PROCEDURE — 99233 SBSQ HOSP IP/OBS HIGH 50: CPT

## 2021-04-25 RX ORDER — METOCLOPRAMIDE HCL 10 MG
5 TABLET ORAL ONCE
Refills: 0 | Status: DISCONTINUED | OUTPATIENT
Start: 2021-04-25 | End: 2021-04-25

## 2021-04-25 RX ADMIN — APIXABAN 5 MILLIGRAM(S): 2.5 TABLET, FILM COATED ORAL at 05:29

## 2021-04-25 RX ADMIN — POLYETHYLENE GLYCOL 3350 17 GRAM(S): 17 POWDER, FOR SOLUTION ORAL at 11:51

## 2021-04-25 RX ADMIN — PANTOPRAZOLE SODIUM 40 MILLIGRAM(S): 20 TABLET, DELAYED RELEASE ORAL at 05:29

## 2021-04-25 RX ADMIN — ATORVASTATIN CALCIUM 20 MILLIGRAM(S): 80 TABLET, FILM COATED ORAL at 21:06

## 2021-04-25 RX ADMIN — MEXILETINE HYDROCHLORIDE 200 MILLIGRAM(S): 150 CAPSULE ORAL at 18:16

## 2021-04-25 RX ADMIN — TIOTROPIUM BROMIDE 1 CAPSULE(S): 18 CAPSULE ORAL; RESPIRATORY (INHALATION) at 10:10

## 2021-04-25 RX ADMIN — Medication 4 MILLIGRAM(S): at 05:29

## 2021-04-25 RX ADMIN — BUDESONIDE AND FORMOTEROL FUMARATE DIHYDRATE 2 PUFF(S): 160; 4.5 AEROSOL RESPIRATORY (INHALATION) at 21:07

## 2021-04-25 RX ADMIN — CALAMINE AND ZINC OXIDE AND PHENOL 1 APPLICATION(S): 160; 10 LOTION TOPICAL at 11:51

## 2021-04-25 RX ADMIN — Medication 2: at 12:51

## 2021-04-25 RX ADMIN — CHLORHEXIDINE GLUCONATE 1 APPLICATION(S): 213 SOLUTION TOPICAL at 11:51

## 2021-04-25 RX ADMIN — Medication 5 MILLIGRAM(S): at 11:51

## 2021-04-25 RX ADMIN — Medication 30 MILLIGRAM(S): at 18:16

## 2021-04-25 RX ADMIN — ONDANSETRON 4 MILLIGRAM(S): 8 TABLET, FILM COATED ORAL at 06:37

## 2021-04-25 RX ADMIN — BUDESONIDE AND FORMOTEROL FUMARATE DIHYDRATE 2 PUFF(S): 160; 4.5 AEROSOL RESPIRATORY (INHALATION) at 10:10

## 2021-04-25 RX ADMIN — ONDANSETRON 4 MILLIGRAM(S): 8 TABLET, FILM COATED ORAL at 21:06

## 2021-04-25 RX ADMIN — INSULIN GLARGINE 15 UNIT(S): 100 INJECTION, SOLUTION SUBCUTANEOUS at 21:40

## 2021-04-25 RX ADMIN — APIXABAN 5 MILLIGRAM(S): 2.5 TABLET, FILM COATED ORAL at 18:16

## 2021-04-25 RX ADMIN — MEXILETINE HYDROCHLORIDE 200 MILLIGRAM(S): 150 CAPSULE ORAL at 05:29

## 2021-04-25 RX ADMIN — ROFLUMILAST 250 MICROGRAM(S): 500 TABLET ORAL at 11:51

## 2021-04-25 NOTE — PROGRESS NOTE ADULT - SUBJECTIVE AND OBJECTIVE BOX
Patient is a 70y old  Female who presents with a chief complaint of Dyspnea on exertion (24 Apr 2021 12:03)      SUBJECTIVE / OVERNIGHT EVENTS: No overnight event. Pt without complaint this morning.    MEDICATIONS  (STANDING):  apixaban 5 milliGRAM(s) Oral two times a day  atorvastatin 20 milliGRAM(s) Oral at bedtime  budesonide  80 MICROgram(s)/formoterol 4.5 MICROgram(s) Inhaler 2 Puff(s) Inhalation two times a day  calamine/zinc oxide Lotion 1 Application(s) Topical daily  chlorhexidine 2% Cloths 1 Application(s) Topical daily  dextrose 40% Gel 15 Gram(s) Oral once  dextrose 5%. 1000 milliLiter(s) (50 mL/Hr) IV Continuous <Continuous>  dextrose 5%. 1000 milliLiter(s) (100 mL/Hr) IV Continuous <Continuous>  dextrose 50% Injectable 25 Gram(s) IV Push once  dextrose 50% Injectable 12.5 Gram(s) IV Push once  dextrose 50% Injectable 25 Gram(s) IV Push once  diltiazem    Tablet 30 milliGRAM(s) Oral every 12 hours  fluticasone propionate 50 MICROgram(s)/spray Nasal Spray 1 Spray(s) Both Nostrils two times a day  glucagon  Injectable 1 milliGRAM(s) IntraMuscular once  insulin glargine Injectable (LANTUS) 15 Unit(s) SubCutaneous at bedtime  insulin lispro (ADMELOG) corrective regimen sliding scale   SubCutaneous three times a day before meals  methylPREDNISolone 4 milliGRAM(s) Oral daily  mexiletine 200 milliGRAM(s) Oral two times a day  pantoprazole    Tablet 40 milliGRAM(s) Oral before breakfast  polyethylene glycol 3350 17 Gram(s) Oral daily  roflumilast 250 MICROGram(s) Oral daily  tiotropium 18 MICROgram(s) Capsule 1 Capsule(s) Inhalation daily    MEDICATIONS  (PRN):  ALBUTerol    90 MICROgram(s) HFA Inhaler 2 Puff(s) Inhalation every 6 hours PRN Shortness of Breath and/or Wheezing  bisacodyl 5 milliGRAM(s) Oral daily PRN Constipation  guaiFENesin   Syrup  (Sugar-Free) 100 milliGRAM(s) Oral every 6 hours PRN Cough  ondansetron Injectable 4 milliGRAM(s) IV Push every 6 hours PRN Nausea and/or Vomiting      CAPILLARY BLOOD GLUCOSE      POCT Blood Glucose.: 109 mg/dL (25 Apr 2021 08:32)  POCT Blood Glucose.: 190 mg/dL (24 Apr 2021 20:45)  POCT Blood Glucose.: 164 mg/dL (24 Apr 2021 17:32)  POCT Blood Glucose.: 223 mg/dL (24 Apr 2021 12:58)    I&O's Summary      PHYSICAL EXAM:  Vital Signs Last 24 Hrs  T(C): 36.9 (25 Apr 2021 10:08), Max: 37.1 (24 Apr 2021 21:08)  T(F): 98.5 (25 Apr 2021 10:08), Max: 98.7 (24 Apr 2021 21:08)  HR: 60 (25 Apr 2021 10:08) (58 - 75)  BP: 131/48 (25 Apr 2021 10:08) (114/49 - 131/48)  BP(mean): --  RR: 17 (25 Apr 2021 10:08) (16 - 18)  SpO2: 100% (25 Apr 2021 10:08) (98% - 100%)  CONSTITUTIONAL: NAD, well-developed, well-groomed  EYES: PERRLA; conjunctiva and sclera clear  ENMT: Moist oral mucosa, no pharyngeal injection or exudates; normal dentition  NECK: Supple, no palpable masses; no thyromegaly  RESPIRATORY: Normal respiratory effort; lungs are clear to auscultation bilaterally  CARDIOVASCULAR: Regular rate and rhythm, normal S1 and S2, no murmur/rub/gallop; No lower extremity edema; Peripheral pulses are 2+ bilaterally  ABDOMEN: Nontender to palpation, normoactive bowel sounds, no rebound/guarding; No hepatosplenomegaly    LABS:                        10.9   6.35  )-----------( 266      ( 25 Apr 2021 07:25 )             34.7     04-25    141  |  105  |  17  ----------------------------<  98  4.1   |  25  |  0.86    Ca    8.9      25 Apr 2021 07:25  Phos  3.6     04-25  Mg     2.1     04-25                  RADIOLOGY & ADDITIONAL TESTS:  Results Reviewed:   Imaging Personally Reviewed:  Electrocardiogram Personally Reviewed:    COORDINATION OF CARE:  Care Discussed with Consultants/Other Providers [Y/N]:  Prior or Outpatient Records Reviewed [Y/N]:

## 2021-04-25 NOTE — DIETITIAN INITIAL EVALUATION ADULT. - PERTINENT LABORATORY DATA
04-25 Na141 mmol/L Glu 98 mg/dL K+ 4.1 mmol/L Cr  0.86 mg/dL BUN 17 mg/dL 04-25 Phos 3.6 mg/dL 04-20 Alb 3.7 g/dL    A1C with Estimated Average Glucose (04.21.21 @ 05:20)    A1C with Estimated Average Glucose Result: 7.3: High Risk (prediabetic)    5.7 - 6.4 %  Diabetic, diagnostic           > 6.5 %  ADA diabetic treatment goal    < 7.0 %  HbA1C values may not accurately reflect mean blood glucose in patients  with Hb variants.  Suggest clinical correlation. %    Estimated Average Glucose: 163 mg/dL    CAPILLARY BLOOD GLUCOSE  POCT Blood Glucose.: 233 mg/dL (25 Apr 2021 12:15)  POCT Blood Glucose.: 109 mg/dL (25 Apr 2021 08:32)  POCT Blood Glucose.: 190 mg/dL (24 Apr 2021 20:45)  POCT Blood Glucose.: 164 mg/dL (24 Apr 2021 17:32)  POCT Blood Glucose.: 223 mg/dL (24 Apr 2021 12:58)

## 2021-04-25 NOTE — DIETITIAN INITIAL EVALUATION ADULT. - PROBLEM SELECTOR PLAN 1
Patient with long standing sensation of palpitations with increasing frequency over past 2 days.  - associated with lightheadedness and dyspnea but no chest pain  - EKG showing sinus rhythm with PVCs, no ischemic changes  - troponins indeterminate, will trend to peak  - remains in NSR with PVCs on telemetry, continue tele monitoring  - previous TTE showing mod-severe AR, will obtain new TTE

## 2021-04-25 NOTE — DIETITIAN INITIAL EVALUATION ADULT. - ADD RECOMMEND
1) Monitor weights, PO intake/diet tolerance, skin integrity, pertinent labs. 2) Honor food preferences as requested; offer meal alteratives as requested within diet parameters.

## 2021-04-25 NOTE — DIETITIAN INITIAL EVALUATION ADULT. - PROBLEM SELECTOR PLAN 2
patient with COPD on Spiriva, daliresp and breo ellipta at home  - following with Dr. Allison outpatient  - c/w tiotropium daily  - c/w daliresp (roflumilast)  - albuterol PRN  - on medrol 4mg daily for adrenal insuff. already, will increase steroid dose if wheezing not improved

## 2021-04-25 NOTE — DIETITIAN INITIAL EVALUATION ADULT. - PERTINENT MEDS FT
MEDICATIONS  (STANDING):  apixaban 5 milliGRAM(s) Oral two times a day  atorvastatin 20 milliGRAM(s) Oral at bedtime  budesonide  80 MICROgram(s)/formoterol 4.5 MICROgram(s) Inhaler 2 Puff(s) Inhalation two times a day  calamine/zinc oxide Lotion 1 Application(s) Topical daily  chlorhexidine 2% Cloths 1 Application(s) Topical daily  dextrose 40% Gel 15 Gram(s) Oral once  dextrose 5%. 1000 milliLiter(s) (50 mL/Hr) IV Continuous <Continuous>  dextrose 5%. 1000 milliLiter(s) (100 mL/Hr) IV Continuous <Continuous>  dextrose 50% Injectable 25 Gram(s) IV Push once  dextrose 50% Injectable 12.5 Gram(s) IV Push once  dextrose 50% Injectable 25 Gram(s) IV Push once  diltiazem    Tablet 30 milliGRAM(s) Oral every 12 hours  fluticasone propionate 50 MICROgram(s)/spray Nasal Spray 1 Spray(s) Both Nostrils two times a day  glucagon  Injectable 1 milliGRAM(s) IntraMuscular once  insulin glargine Injectable (LANTUS) 15 Unit(s) SubCutaneous at bedtime  insulin lispro (ADMELOG) corrective regimen sliding scale   SubCutaneous three times a day before meals  methylPREDNISolone 4 milliGRAM(s) Oral daily  mexiletine 200 milliGRAM(s) Oral two times a day  pantoprazole    Tablet 40 milliGRAM(s) Oral before breakfast  polyethylene glycol 3350 17 Gram(s) Oral daily  roflumilast 250 MICROGram(s) Oral daily  tiotropium 18 MICROgram(s) Capsule 1 Capsule(s) Inhalation daily    MEDICATIONS  (PRN):  ALBUTerol    90 MICROgram(s) HFA Inhaler 2 Puff(s) Inhalation every 6 hours PRN Shortness of Breath and/or Wheezing  bisacodyl 5 milliGRAM(s) Oral daily PRN Constipation  guaiFENesin   Syrup  (Sugar-Free) 100 milliGRAM(s) Oral every 6 hours PRN Cough  ondansetron Injectable 4 milliGRAM(s) IV Push every 6 hours PRN Nausea and/or Vomiting

## 2021-04-25 NOTE — DIETITIAN INITIAL EVALUATION ADULT. - OTHER INFO
RD visited with patient for requested consultation.  Patient reported to RD that she asked to see a dietitian because she is not getting what she is ordering on her menus, and is only receiving 1 Glucerna shake per day vs. 2 as ordered.  RD to communicate same to diet office so that tray can be more closely checked.  Patient denies needing diet education - stated she counts carbohydrates and has been previously educated regarding same.  Patient also denies needing diet education pertaining to colostomy.  RD offered diet materials that were prepared, however, she stated she does not need any written materials at this time. Patient stated her usual body weight is approximately 140 pounds. Height from previous admission at [facility] 67.5 inches and weight was 62kgs 12/7/20.   No admit weight recorded. Patient reported some nausea, but denies vomiting/diarrhea and indicated that her nausea was improving.     Per chart, food allergy to shellfish noted. RD visited with patient for requested consultation.  Patient reported to RD that she asked to see a dietitian because she is not getting what she is ordering on her menus, and is only receiving 1 Glucerna shake per day vs. 2 as ordered.  RD to communicate same to diet office so that tray can be more closely checked. RD also explained to patient that if her menu selections exceed her carbohydrate limit at a given meal, the selections may be modified to ensure that she remains within the carbohydrate limit at a given meal. Patient denies needing diet education - stated she counts carbohydrates and has been previously educated regarding same.  Patient also denies needing diet education pertaining to colostomy.  RD offered diet materials that were prepared, however, she stated she does not need any written materials at this time. Patient stated her usual body weight is approximately 140 pounds. Height from previous admission at [facility] 67.5 inches and weight was 62kgs 12/7/20.   No admit weight recorded. Patient reported some nausea, but denies vomiting/diarrhea and indicated that her nausea was improving.     Per chart, food allergy to shellfish noted.

## 2021-04-25 NOTE — DIETITIAN INITIAL EVALUATION ADULT. - PROBLEM SELECTOR PLAN 4
-currently on lantus 20U qhs, Humalog sliding scale and victoza at home  - will hold victoza while inpatient  - c/w lantus 15U qhs as patient reports reduced appetite recently and admelog sliding scale with meals. Can uptitrate if PO intake improves  - FS TIDAC

## 2021-04-26 LAB
ANION GAP SERPL CALC-SCNC: 9 MMOL/L — SIGNIFICANT CHANGE UP (ref 7–14)
BUN SERPL-MCNC: 17 MG/DL — SIGNIFICANT CHANGE UP (ref 7–23)
CALCIUM SERPL-MCNC: 8.9 MG/DL — SIGNIFICANT CHANGE UP (ref 8.4–10.5)
CHLORIDE SERPL-SCNC: 106 MMOL/L — SIGNIFICANT CHANGE UP (ref 98–107)
CK MB BLD-MCNC: 0.9 % — SIGNIFICANT CHANGE UP (ref 0–2.5)
CK MB CFR SERPL CALC: 2.2 NG/ML — SIGNIFICANT CHANGE UP
CK SERPL-CCNC: 240 U/L — HIGH (ref 25–170)
CO2 SERPL-SCNC: 27 MMOL/L — SIGNIFICANT CHANGE UP (ref 22–31)
CREAT SERPL-MCNC: 0.94 MG/DL — SIGNIFICANT CHANGE UP (ref 0.5–1.3)
GLUCOSE BLDC GLUCOMTR-MCNC: 111 MG/DL — HIGH (ref 70–99)
GLUCOSE BLDC GLUCOMTR-MCNC: 123 MG/DL — HIGH (ref 70–99)
GLUCOSE BLDC GLUCOMTR-MCNC: 154 MG/DL — HIGH (ref 70–99)
GLUCOSE BLDC GLUCOMTR-MCNC: 157 MG/DL — HIGH (ref 70–99)
GLUCOSE SERPL-MCNC: 90 MG/DL — SIGNIFICANT CHANGE UP (ref 70–99)
HCT VFR BLD CALC: 35.9 % — SIGNIFICANT CHANGE UP (ref 34.5–45)
HGB BLD-MCNC: 11 G/DL — LOW (ref 11.5–15.5)
MAGNESIUM SERPL-MCNC: 2 MG/DL — SIGNIFICANT CHANGE UP (ref 1.6–2.6)
MCHC RBC-ENTMCNC: 22.9 PG — LOW (ref 27–34)
MCHC RBC-ENTMCNC: 30.6 GM/DL — LOW (ref 32–36)
MCV RBC AUTO: 74.8 FL — LOW (ref 80–100)
NRBC # BLD: 0 /100 WBCS — SIGNIFICANT CHANGE UP
NRBC # FLD: 0 K/UL — SIGNIFICANT CHANGE UP
PHOSPHATE SERPL-MCNC: 3.6 MG/DL — SIGNIFICANT CHANGE UP (ref 2.5–4.5)
PLATELET # BLD AUTO: 282 K/UL — SIGNIFICANT CHANGE UP (ref 150–400)
POTASSIUM SERPL-MCNC: 4.1 MMOL/L — SIGNIFICANT CHANGE UP (ref 3.5–5.3)
POTASSIUM SERPL-SCNC: 4.1 MMOL/L — SIGNIFICANT CHANGE UP (ref 3.5–5.3)
RBC # BLD: 4.8 M/UL — SIGNIFICANT CHANGE UP (ref 3.8–5.2)
RBC # FLD: 17.2 % — HIGH (ref 10.3–14.5)
SODIUM SERPL-SCNC: 142 MMOL/L — SIGNIFICANT CHANGE UP (ref 135–145)
TROPONIN T, HIGH SENSITIVITY RESULT: 20 NG/L — SIGNIFICANT CHANGE UP
WBC # BLD: 6.58 K/UL — SIGNIFICANT CHANGE UP (ref 3.8–10.5)
WBC # FLD AUTO: 6.58 K/UL — SIGNIFICANT CHANGE UP (ref 3.8–10.5)

## 2021-04-26 PROCEDURE — 99232 SBSQ HOSP IP/OBS MODERATE 35: CPT

## 2021-04-26 PROCEDURE — 99233 SBSQ HOSP IP/OBS HIGH 50: CPT

## 2021-04-26 RX ORDER — METOCLOPRAMIDE HCL 10 MG
5 TABLET ORAL EVERY 6 HOURS
Refills: 0 | Status: DISCONTINUED | OUTPATIENT
Start: 2021-04-26 | End: 2021-04-29

## 2021-04-26 RX ORDER — MEXILETINE HYDROCHLORIDE 150 MG/1
200 CAPSULE ORAL THREE TIMES A DAY
Refills: 0 | Status: DISCONTINUED | OUTPATIENT
Start: 2021-04-26 | End: 2021-04-29

## 2021-04-26 RX ADMIN — ATORVASTATIN CALCIUM 20 MILLIGRAM(S): 80 TABLET, FILM COATED ORAL at 21:34

## 2021-04-26 RX ADMIN — POLYETHYLENE GLYCOL 3350 17 GRAM(S): 17 POWDER, FOR SOLUTION ORAL at 12:08

## 2021-04-26 RX ADMIN — ROFLUMILAST 250 MICROGRAM(S): 500 TABLET ORAL at 12:08

## 2021-04-26 RX ADMIN — APIXABAN 5 MILLIGRAM(S): 2.5 TABLET, FILM COATED ORAL at 18:02

## 2021-04-26 RX ADMIN — MEXILETINE HYDROCHLORIDE 200 MILLIGRAM(S): 150 CAPSULE ORAL at 05:20

## 2021-04-26 RX ADMIN — CALAMINE AND ZINC OXIDE AND PHENOL 1 APPLICATION(S): 160; 10 LOTION TOPICAL at 13:00

## 2021-04-26 RX ADMIN — APIXABAN 5 MILLIGRAM(S): 2.5 TABLET, FILM COATED ORAL at 05:20

## 2021-04-26 RX ADMIN — Medication 30 MILLILITER(S): at 10:21

## 2021-04-26 RX ADMIN — Medication 5 MILLIGRAM(S): at 12:08

## 2021-04-26 RX ADMIN — Medication 4 MILLIGRAM(S): at 05:20

## 2021-04-26 RX ADMIN — CHLORHEXIDINE GLUCONATE 1 APPLICATION(S): 213 SOLUTION TOPICAL at 13:02

## 2021-04-26 RX ADMIN — TIOTROPIUM BROMIDE 1 CAPSULE(S): 18 CAPSULE ORAL; RESPIRATORY (INHALATION) at 10:20

## 2021-04-26 RX ADMIN — BUDESONIDE AND FORMOTEROL FUMARATE DIHYDRATE 2 PUFF(S): 160; 4.5 AEROSOL RESPIRATORY (INHALATION) at 21:35

## 2021-04-26 RX ADMIN — INSULIN GLARGINE 15 UNIT(S): 100 INJECTION, SOLUTION SUBCUTANEOUS at 21:37

## 2021-04-26 RX ADMIN — Medication 1: at 17:59

## 2021-04-26 RX ADMIN — MEXILETINE HYDROCHLORIDE 200 MILLIGRAM(S): 150 CAPSULE ORAL at 21:34

## 2021-04-26 RX ADMIN — PANTOPRAZOLE SODIUM 40 MILLIGRAM(S): 20 TABLET, DELAYED RELEASE ORAL at 05:20

## 2021-04-26 RX ADMIN — BUDESONIDE AND FORMOTEROL FUMARATE DIHYDRATE 2 PUFF(S): 160; 4.5 AEROSOL RESPIRATORY (INHALATION) at 10:19

## 2021-04-26 RX ADMIN — Medication 30 MILLIGRAM(S): at 18:02

## 2021-04-26 RX ADMIN — Medication 1: at 12:59

## 2021-04-26 RX ADMIN — MEXILETINE HYDROCHLORIDE 200 MILLIGRAM(S): 150 CAPSULE ORAL at 14:38

## 2021-04-26 NOTE — CHART NOTE - NSCHARTNOTEFT_GEN_A_CORE
ACP MEDICINE COVERAGE - Medicine Subsequent Hospital Care Note    CC:  HPI/Subjective:    ROS:  Denies fever, chills, diaphoresis, malaise, night sweats, generalized weakness, headache, changes in vision, dizziness, cough, sputum production, wheezing, hemoptysis, chest pain, palpitations, shortness of breath, dyspnea on exertion, PND, dysphagia, new abdominal pain, nausea, vomiting, diarrhea, constipation, melena, hematochezia, dysuria, increased urinary frequency/urgency, hematuria, nocturia, numbness/weakness/tingling, any other complaints.    [  ] I spoke with the attending, nurse, and family to obtain the history.  [  ] Unable to obtain history due to ___________.    Vital Signs Last 24 Hrs  T(C): 36.7 (04-26-21 @ 13:53), Max: 37.1 (04-25-21 @ 18:13)  T(F): 98 (04-26-21 @ 13:53), Max: 98.8 (04-25-21 @ 21:05)  HR: 76 (04-26-21 @ 13:53) (57 - 76)  BP: 147/61 (04-26-21 @ 13:53) (117/50 - 167/67)  RR: 18 (04-26-21 @ 13:53) (16 - 20)  SpO2: 100% (04-26-21 @ 13:53) (97% - 100%) on (O2)    PHYSICAL EXAM:  Constitutional: NAD, well-developed, well-nourished  Ears, nose, Mouth, and Throat: normal external ears and nose, normal hearing, moist oral mucosa  Eyes: normal conjunctiva, EOMI, PEERL  Neck: supple, no JVD  Respiratory: Clear to auscultation bilaterally. No wheezes, rales or rhonchi. Normal respiratory effort  Cardiovascular: regular rate and rhythm, S1 and S2, no murmurs, rubs or gallops, no edema, 2+ peripheral pulses  Gastrointestinal: soft, nontender, nondistended, +bowel sounds, no hernia  Skin: warm, dry, no rash  Neurologic: sensation grossly intact, CN grossly intact, non-focal exam  Musculoskeletal: no clubbing, no cyanosis, no joint swelling  Psychiatric: A&Ox3, appropriate mood, affect    LABS:                        11.0   6.58  )-----------( 282      ( 26 Apr 2021 07:29 )             35.9     04-26    142  |  106  |  17  ----------------------------<  90  4.1   |  27  |  0.94    Ca    8.9      26 Apr 2021 07:29  Phos  3.6     04-26  Mg     2.0     04-26        CARDIAC ENZYMES    Creatine Kinase, Serum: 240 U/L (04-26-21 @ 10:41)          Serum Pro-Brain Natriuretic Peptide: 340 pg/mL (04-20-21 @ 16:55)    D-Dimer Assay:     Urinanalysis Basic (04-26-21 @ 14:43):      Blood Gas Venous (04-26-21 @ 14:43):  pH: 7.39 | HCO3: 26 | pCO2: 47 | pO2: 43 | Lactate: 1.7      Blood Gas Arterial (04-26-21 @ 14:43):      CAPILLARY BLOOD GLUCOSE:  POCT Blood Glucose: 157 mg/dL (04-26-21 @ 12:20)  POCT Blood Glucose: 123 mg/dL (04-26-21 @ 09:54)  POCT Blood Glucose: 111 mg/dL (04-26-21 @ 08:48)      COVID PCR:  NotDetec (04-20-21 @ 17:48)      RADIOLOGY:    MEDICATIONS  (STANDING):  apixaban 5 milliGRAM(s) Oral two times a day  atorvastatin 20 milliGRAM(s) Oral at bedtime  budesonide  80 MICROgram(s)/formoterol 4.5 MICROgram(s) Inhaler 2 Puff(s) Inhalation two times a day  calamine/zinc oxide Lotion 1 Application(s) Topical daily  chlorhexidine 2% Cloths 1 Application(s) Topical daily  dextrose 40% Gel 15 Gram(s) Oral once  dextrose 5%. 1000 milliLiter(s) (50 mL/Hr) IV Continuous <Continuous>  dextrose 5%. 1000 milliLiter(s) (100 mL/Hr) IV Continuous <Continuous>  dextrose 50% Injectable 25 Gram(s) IV Push once  dextrose 50% Injectable 12.5 Gram(s) IV Push once  dextrose 50% Injectable 25 Gram(s) IV Push once  diltiazem    Tablet 30 milliGRAM(s) Oral every 12 hours  fluticasone propionate 50 MICROgram(s)/spray Nasal Spray 1 Spray(s) Both Nostrils two times a day  glucagon  Injectable 1 milliGRAM(s) IntraMuscular once  insulin glargine Injectable (LANTUS) 15 Unit(s) SubCutaneous at bedtime  insulin lispro (ADMELOG) corrective regimen sliding scale   SubCutaneous three times a day before meals  methylPREDNISolone 4 milliGRAM(s) Oral daily  mexiletine 200 milliGRAM(s) Oral three times a day  pantoprazole    Tablet 40 milliGRAM(s) Oral before breakfast  polyethylene glycol 3350 17 Gram(s) Oral daily  roflumilast 250 MICROGram(s) Oral daily  tiotropium 18 MICROgram(s) Capsule 1 Capsule(s) Inhalation daily    MEDICATIONS  (PRN):  ALBUTerol    90 MICROgram(s) HFA Inhaler 2 Puff(s) Inhalation every 6 hours PRN Shortness of Breath and/or Wheezing  aluminum hydroxide/magnesium hydroxide/simethicone Suspension 30 milliLiter(s) Oral every 4 hours PRN Dyspepsia  bisacodyl 5 milliGRAM(s) Oral daily PRN Constipation  guaiFENesin   Syrup  (Sugar-Free) 100 milliGRAM(s) Oral every 6 hours PRN Cough  metoclopramide 5 milliGRAM(s) Oral every 6 hours PRN nausea  ondansetron Injectable 4 milliGRAM(s) IV Push every 6 hours PRN Nausea and/or Vomiting    I&O's Summary    I reviewed the above labs, radiology, medications, tests, telemetry, and EKG interpretation.    ASSESSMENT/PLAN:    - Clinical findings, labs, tests, telemetry, and ekg reviewed with attending. Will monitor patient closely.       Low complexity/risk (30min)  pt seen and examined     -pt with chest pressure   ekg no signs of ischemia frequent pvcs reviewed by Dr. Aragon   Will follow up ep recs   maalox given x 1   d/w medical attending  Dr. Braden in agreement with plan [facility] MEDICINE COVERAGE - Medicine Subsequent Hospital Care Note    CC:  HPI/Subjective:    ROS:  Denies fever, chills, diaphoresis, malaise, night sweats, generalized weakness, headache, changes in vision, dizziness, cough, sputum production, wheezing, hemoptysis, chest pain, palpitations, shortness of breath, dyspnea on exertion, PND, dysphagia, new abdominal pain, nausea, vomiting, diarrhea, constipation, melena, hematochezia, dysuria, increased urinary frequency/urgency, hematuria, nocturia, numbness/weakness/tingling, any other complaints.    [  ] I spoke with the attending, nurse, and family to obtain the history.  [  ] Unable to obtain history due to ___________.    Vital Signs Last 24 Hrs  T(C): 36.7 (04-26-21 @ 13:53), Max: 37.1 (04-25-21 @ 18:13)  T(F): 98 (04-26-21 @ 13:53), Max: 98.8 (04-25-21 @ 21:05)  HR: 76 (04-26-21 @ 13:53) (57 - 76)  BP: 147/61 (04-26-21 @ 13:53) (117/50 - 167/67)  RR: 18 (04-26-21 @ 13:53) (16 - 20)  SpO2: 100% (04-26-21 @ 13:53) (97% - 100%) on (O2)    PHYSICAL EXAM:  Constitutional: NAD, well-developed, well-nourished  Ears, nose, Mouth, and Throat: normal external ears and nose, normal hearing, moist oral mucosa  Eyes: normal conjunctiva, EOMI, PEERL  Neck: supple, no JVD  Respiratory: Clear to auscultation bilaterally. No wheezes, rales or rhonchi. Normal respiratory effort  Cardiovascular: regular rate and rhythm, S1 and S2, no murmurs, rubs or gallops, no edema, 2+ peripheral pulses  Gastrointestinal: soft, nontender, nondistended, +bowel sounds, no hernia  Skin: warm, dry, no rash  Neurologic: sensation grossly intact, CN grossly intact, non-focal exam  Musculoskeletal: no clubbing, no cyanosis, no joint swelling  Psychiatric: A&Ox3, appropriate mood, affect    LABS:                        11.0   6.58  )-----------( 282      ( 26 Apr 2021 07:29 )             35.9     04-26    142  |  106  |  17  ----------------------------<  90  4.1   |  27  |  0.94    Ca    8.9      26 Apr 2021 07:29  Phos  3.6     04-26  Mg     2.0     04-26        CARDIAC ENZYMES    Creatine Kinase, Serum: 240 U/L (04-26-21 @ 10:41)          Serum Pro-Brain Natriuretic Peptide: 340 pg/mL (04-20-21 @ 16:55)    D-Dimer Assay:     Urinanalysis Basic (04-26-21 @ 14:43):      Blood Gas Venous (04-26-21 @ 14:43):  pH: 7.39 | HCO3: 26 | pCO2: 47 | pO2: 43 | Lactate: 1.7      Blood Gas Arterial (04-26-21 @ 14:43):      CAPILLARY BLOOD GLUCOSE:  POCT Blood Glucose: 157 mg/dL (04-26-21 @ 12:20)  POCT Blood Glucose: 123 mg/dL (04-26-21 @ 09:54)  POCT Blood Glucose: 111 mg/dL (04-26-21 @ 08:48)      COVID PCR:  NotDetec (04-20-21 @ 17:48)      RADIOLOGY:    MEDICATIONS  (STANDING):  apixaban 5 milliGRAM(s) Oral two times a day  atorvastatin 20 milliGRAM(s) Oral at bedtime  budesonide  80 MICROgram(s)/formoterol 4.5 MICROgram(s) Inhaler 2 Puff(s) Inhalation two times a day  calamine/zinc oxide Lotion 1 Application(s) Topical daily  chlorhexidine 2% Cloths 1 Application(s) Topical daily  dextrose 40% Gel 15 Gram(s) Oral once  dextrose 5%. 1000 milliLiter(s) (50 mL/Hr) IV Continuous <Continuous>  dextrose 5%. 1000 milliLiter(s) (100 mL/Hr) IV Continuous <Continuous>  dextrose 50% Injectable 25 Gram(s) IV Push once  dextrose 50% Injectable 12.5 Gram(s) IV Push once  dextrose 50% Injectable 25 Gram(s) IV Push once  diltiazem    Tablet 30 milliGRAM(s) Oral every 12 hours  fluticasone propionate 50 MICROgram(s)/spray Nasal Spray 1 Spray(s) Both Nostrils two times a day  glucagon  Injectable 1 milliGRAM(s) IntraMuscular once  insulin glargine Injectable (LANTUS) 15 Unit(s) SubCutaneous at bedtime  insulin lispro (ADMELOG) corrective regimen sliding scale   SubCutaneous three times a day before meals  methylPREDNISolone 4 milliGRAM(s) Oral daily  mexiletine 200 milliGRAM(s) Oral three times a day  pantoprazole    Tablet 40 milliGRAM(s) Oral before breakfast  polyethylene glycol 3350 17 Gram(s) Oral daily  roflumilast 250 MICROGram(s) Oral daily  tiotropium 18 MICROgram(s) Capsule 1 Capsule(s) Inhalation daily    MEDICATIONS  (PRN):  ALBUTerol    90 MICROgram(s) HFA Inhaler 2 Puff(s) Inhalation every 6 hours PRN Shortness of Breath and/or Wheezing  aluminum hydroxide/magnesium hydroxide/simethicone Suspension 30 milliLiter(s) Oral every 4 hours PRN Dyspepsia  bisacodyl 5 milliGRAM(s) Oral daily PRN Constipation  guaiFENesin   Syrup  (Sugar-Free) 100 milliGRAM(s) Oral every 6 hours PRN Cough  metoclopramide 5 milliGRAM(s) Oral every 6 hours PRN nausea  ondansetron Injectable 4 milliGRAM(s) IV Push every 6 hours PRN Nausea and/or Vomiting    I&O's Summary    I reviewed the above labs, radiology, medications, tests, telemetry, and EKG interpretation.    ASSESSMENT/PLAN:    - Clinical findings, labs, tests, telemetry, and ekg reviewed with attending. Will monitor patient closely.       Low complexity/risk (30min)  pt seen and examined     -pt with chest pressure   ekg no signs of ischemia frequent pvcs reviewed by Dr. Aragon   Will follow up ep recs   maalox given x 1   d/w medical attending  Dr. Braden in agreement with plan  -chest pain resolved

## 2021-04-26 NOTE — PROVIDER CONTACT NOTE (OTHER) - BACKGROUND
Pt is a 70 yr old female w/ pmhx of diabetes, DVT, TIA, COPD, Asthma and seizure p/w palpitations and dyspnea on exertion
pt admitted for dizziness and giddiness
Pt is a 70 yr old female w/ pmhx of diabetes, DVT, TIA, COPD, Asthma and seizure p/w palpitations and dyspnea on exertion
Patient admitted with palpitation and KRAMER.

## 2021-04-26 NOTE — PROGRESS NOTE ADULT - ASSESSMENT
This is a 70 year old woman with a pmhx of mod-severe AI and PAF (on Eliquis), tracheobronchomalacia s/p tracheobronchoplasty, lung nodules, severe allergic asthma, adrenal insufficiency, bronchiectasis, T2DM, HTN, CRC s/p colostomy who presented to [facility] ED from pulmonary rehab due to worsening SOB and palpitation. According to the patient, this is different from her typical asthma exacerbation and stated that  her palpitations lead to her sob. Patient believes that she went into" Afib " on Saturday  based on her worsening symptoms. There is no EKG or telemetric strip showing this. She also stated that the palpitation was associated with a band-like tightness around her abdomen, weakness, and dizziness which she has been experiencing for the past year. Pulmonology started her on prednisone 40mg x 5 days given possible asthma exacerbation with plans to restart home medrol after. Ep was consulted for Afib and bradycardia. Upon my evaluation, patient was in SR with frequent PVC and bigeminy HR 70-90s. Patient continues to have PVC and Bigeminy which correlates with her symptom, EP recommends starting mexiletine 200mg po BID    Plan:  - Continuous telemetric monitoring for PVC burden, Afib and bradycardia  - Monitor electrolytes and replete K to 4 and Mg to 2  - Continue Eliquis 5mg po qd for thromboembolic ppx as patient has a [id number] score of 4 (Age, Sex, HTN, T2DM)  - TTE revealed EF of 61%Normal left ventricular systolic function, no WMA, mildly dilated LA, moderate AR  - Continue home diltiazem 30mg po BID and monitor HR and BP  - Increase mexiletine 200mg po TID, patient was given education material on the medication - have patient take with meals

## 2021-04-26 NOTE — PROVIDER CONTACT NOTE (OTHER) - SITUATION
Patient noted with c/o chest discomfort. Patient with c/o chest discomfort. Stated "something is not right", denies pain. ALLEY Chu made aware
pt first blood glucose 8:21 58, repeat blood glucose 8:21 72, results populated backwards in sunrise, pt not hypoglycemic below 70 at this time
Pt HR 57
Pt HR 58

## 2021-04-26 NOTE — PROVIDER CONTACT NOTE (OTHER) - RECOMMENDATIONS
Contact provider and reschedule morning Cardizem to later time and continue to monitor for developing s/s.
Contact provider and reschedule morning Cardizem to later time and continue to monitor for developing s/s.
EKG, labs
eat breakfast, monitor

## 2021-04-26 NOTE — PROGRESS NOTE ADULT - SUBJECTIVE AND OBJECTIVE BOX
[facility] Division of Hospital Medicine  Sarahi Braden MD  Pager [phone]      Patient is a 70y old  Female who presents with a chief complaint of Dyspnea on exertion (26 Apr 2021 12:15)      SUBJECTIVE / OVERNIGHT EVENTS:    No acute event o/n. Pt reports chest pressure this am. Trop neg. No new tele events     ADDITIONAL REVIEW OF SYSTEMS:    RESPIRATORY: No cough, wheezing, chills or hemoptysis; No shortness of breath  CARDIOVASCULAR: No chest pain, palpitations, dizziness, or leg swelling  GASTROINTESTINAL: No abdominal or epigastric pain. No nausea, vomiting, or hematemesis; No diarrhea or constipation. No melena or hematochezia.      MEDICATIONS  (STANDING):  apixaban 5 milliGRAM(s) Oral two times a day  atorvastatin 20 milliGRAM(s) Oral at bedtime  budesonide  80 MICROgram(s)/formoterol 4.5 MICROgram(s) Inhaler 2 Puff(s) Inhalation two times a day  calamine/zinc oxide Lotion 1 Application(s) Topical daily  chlorhexidine 2% Cloths 1 Application(s) Topical daily  dextrose 40% Gel 15 Gram(s) Oral once  dextrose 5%. 1000 milliLiter(s) (50 mL/Hr) IV Continuous <Continuous>  dextrose 5%. 1000 milliLiter(s) (100 mL/Hr) IV Continuous <Continuous>  dextrose 50% Injectable 25 Gram(s) IV Push once  dextrose 50% Injectable 12.5 Gram(s) IV Push once  dextrose 50% Injectable 25 Gram(s) IV Push once  diltiazem    Tablet 30 milliGRAM(s) Oral every 12 hours  fluticasone propionate 50 MICROgram(s)/spray Nasal Spray 1 Spray(s) Both Nostrils two times a day  glucagon  Injectable 1 milliGRAM(s) IntraMuscular once  insulin glargine Injectable (LANTUS) 15 Unit(s) SubCutaneous at bedtime  insulin lispro (ADMELOG) corrective regimen sliding scale   SubCutaneous three times a day before meals  methylPREDNISolone 4 milliGRAM(s) Oral daily  mexiletine 200 milliGRAM(s) Oral three times a day  pantoprazole    Tablet 40 milliGRAM(s) Oral before breakfast  polyethylene glycol 3350 17 Gram(s) Oral daily  roflumilast 250 MICROGram(s) Oral daily  tiotropium 18 MICROgram(s) Capsule 1 Capsule(s) Inhalation daily    MEDICATIONS  (PRN):  ALBUTerol    90 MICROgram(s) HFA Inhaler 2 Puff(s) Inhalation every 6 hours PRN Shortness of Breath and/or Wheezing  aluminum hydroxide/magnesium hydroxide/simethicone Suspension 30 milliLiter(s) Oral every 4 hours PRN Dyspepsia  bisacodyl 5 milliGRAM(s) Oral daily PRN Constipation  guaiFENesin   Syrup  (Sugar-Free) 100 milliGRAM(s) Oral every 6 hours PRN Cough  metoclopramide 5 milliGRAM(s) Oral every 6 hours PRN nausea  ondansetron Injectable 4 milliGRAM(s) IV Push every 6 hours PRN Nausea and/or Vomiting      CAPILLARY BLOOD GLUCOSE      POCT Blood Glucose.: 157 mg/dL (26 Apr 2021 12:20)  POCT Blood Glucose.: 123 mg/dL (26 Apr 2021 09:54)  POCT Blood Glucose.: 111 mg/dL (26 Apr 2021 08:48)  POCT Blood Glucose.: 237 mg/dL (25 Apr 2021 21:28)  POCT Blood Glucose.: 111 mg/dL (25 Apr 2021 17:04)    I&O's Summary      PHYSICAL EXAM:  Vital Signs Last 24 Hrs  T(C): 36.8 (26 Apr 2021 09:52), Max: 37.1 (25 Apr 2021 18:13)  T(F): 98.3 (26 Apr 2021 09:52), Max: 98.8 (25 Apr 2021 21:05)  HR: 67 (26 Apr 2021 09:52) (57 - 74)  BP: 167/67 (26 Apr 2021 09:52) (117/50 - 167/67)  BP(mean): --  RR: 18 (26 Apr 2021 10:20) (16 - 20)  SpO2: 100% (26 Apr 2021 10:20) (97% - 100%)    CONSTITUTIONAL: NAD,  EYES: PERRLA; conjunctiva and sclera clear  ENMT: Moist oral mucosa, no pharyngeal injection or exudates;   NECK: Supple, no palpable masses;  RESPIRATORY: Normal respiratory effort; lungs are clear to auscultation bilaterally  CARDIOVASCULAR: Regular rate and rhythm, normal S1 and S2, no murmur/rub/gallop; No lower extremity edema; Peripheral pulses are 2+ bilaterally  ABDOMEN: Nontender to palpation, normoactive bowel sounds, no rebound/guarding;   MUSCLOSKELETAL:   no clubbing or cyanosis of digits; no joint swelling or tenderness to palpation  PSYCH: A+O to person, place, and time; affect appropriate  NEUROLOGY: CN 2-12 are intact and symmetric; no gross sensory deficits;   SKIN: No rashes;     LABS:                        11.0   6.58  )-----------( 282      ( 26 Apr 2021 07:29 )             35.9     04-26    142  |  106  |  17  ----------------------------<  90  4.1   |  27  |  0.94    Ca    8.9      26 Apr 2021 07:29  Phos  3.6     04-26  Mg     2.0     04-26        CARDIAC MARKERS ( 26 Apr 2021 10:41 )  x     / x     / 240 U/L / x     / 2.2 ng/mL            RADIOLOGY & ADDITIONAL TESTS:  Results Reviewed:   Imaging Personally Reviewed:  Electrocardiogram Personally Reviewed:    COORDINATION OF CARE:  Care Discussed with Consultants/Other Providers [Y/N]:  Prior or Outpatient Records Reviewed [Y/N]:

## 2021-04-26 NOTE — PROVIDER CONTACT NOTE (OTHER) - ASSESSMENT
Patient noted holding epigastric area c/o not feeling right. VS stable
Pt is AxO 4. Other vital signs stable. Pt denies headache, dizziness, chest pain and shortness of breath. Pt denies any other s/s. Pt appears to be resting comfortably in bed.
pt resting in bed comfortably, no acute distress noted, pt eating breakfast at this time and drinking juice
Pt is AxO 4. Other vital signs stable. Pt denies headache, dizziness, chest pain and shortness of breath. Pt denies any other s/s. Pt appears to be resting comfortably in bed.

## 2021-04-26 NOTE — PROVIDER CONTACT NOTE (OTHER) - ACTION/TREATMENT ORDERED:
Placed on 2liters n/c for comfort. EKG done, labs sent. Maalox given. Patient verbalized feeling better post Maalox.
Provider made aware. Morning cardizem to be rescheduled for later time. Will continue to monitor for developing s/s.
Provider made aware. Morning cardizem to be rescheduled for later time. Will continue to monitor for developing s/s.
eat, monitor

## 2021-04-27 LAB
ALBUMIN SERPL ELPH-MCNC: 3.5 G/DL — SIGNIFICANT CHANGE UP (ref 3.3–5)
ALP SERPL-CCNC: 70 U/L — SIGNIFICANT CHANGE UP (ref 40–120)
ALT FLD-CCNC: 11 U/L — SIGNIFICANT CHANGE UP (ref 4–33)
ANION GAP SERPL CALC-SCNC: 8 MMOL/L — SIGNIFICANT CHANGE UP (ref 7–14)
AST SERPL-CCNC: 12 U/L — SIGNIFICANT CHANGE UP (ref 4–32)
BILIRUB SERPL-MCNC: 0.3 MG/DL — SIGNIFICANT CHANGE UP (ref 0.2–1.2)
BUN SERPL-MCNC: 13 MG/DL — SIGNIFICANT CHANGE UP (ref 7–23)
CALCIUM SERPL-MCNC: 8.7 MG/DL — SIGNIFICANT CHANGE UP (ref 8.4–10.5)
CHLORIDE SERPL-SCNC: 105 MMOL/L — SIGNIFICANT CHANGE UP (ref 98–107)
CO2 SERPL-SCNC: 30 MMOL/L — SIGNIFICANT CHANGE UP (ref 22–31)
CREAT SERPL-MCNC: 0.83 MG/DL — SIGNIFICANT CHANGE UP (ref 0.5–1.3)
GLUCOSE SERPL-MCNC: 68 MG/DL — LOW (ref 70–99)
HCT VFR BLD CALC: 34.9 % — SIGNIFICANT CHANGE UP (ref 34.5–45)
HGB BLD-MCNC: 10.8 G/DL — LOW (ref 11.5–15.5)
MAGNESIUM SERPL-MCNC: 2 MG/DL — SIGNIFICANT CHANGE UP (ref 1.6–2.6)
MCHC RBC-ENTMCNC: 23 PG — LOW (ref 27–34)
MCHC RBC-ENTMCNC: 30.9 GM/DL — LOW (ref 32–36)
MCV RBC AUTO: 74.3 FL — LOW (ref 80–100)
NRBC # BLD: 0 /100 WBCS — SIGNIFICANT CHANGE UP
NRBC # FLD: 0 K/UL — SIGNIFICANT CHANGE UP
PHOSPHATE SERPL-MCNC: 3.8 MG/DL — SIGNIFICANT CHANGE UP (ref 2.5–4.5)
PLATELET # BLD AUTO: 266 K/UL — SIGNIFICANT CHANGE UP (ref 150–400)
POTASSIUM SERPL-MCNC: 3.5 MMOL/L — SIGNIFICANT CHANGE UP (ref 3.5–5.3)
POTASSIUM SERPL-SCNC: 3.5 MMOL/L — SIGNIFICANT CHANGE UP (ref 3.5–5.3)
PROT SERPL-MCNC: 6.1 G/DL — SIGNIFICANT CHANGE UP (ref 6–8.3)
RBC # BLD: 4.7 M/UL — SIGNIFICANT CHANGE UP (ref 3.8–5.2)
RBC # FLD: 17.2 % — HIGH (ref 10.3–14.5)
SODIUM SERPL-SCNC: 143 MMOL/L — SIGNIFICANT CHANGE UP (ref 135–145)
WBC # BLD: 6.15 K/UL — SIGNIFICANT CHANGE UP (ref 3.8–10.5)
WBC # FLD AUTO: 6.15 K/UL — SIGNIFICANT CHANGE UP (ref 3.8–10.5)

## 2021-04-27 PROCEDURE — 99233 SBSQ HOSP IP/OBS HIGH 50: CPT

## 2021-04-27 PROCEDURE — 99232 SBSQ HOSP IP/OBS MODERATE 35: CPT

## 2021-04-27 RX ORDER — POTASSIUM CHLORIDE 20 MEQ
40 PACKET (EA) ORAL ONCE
Refills: 0 | Status: COMPLETED | OUTPATIENT
Start: 2021-04-27 | End: 2021-04-27

## 2021-04-27 RX ADMIN — Medication 5 MILLIGRAM(S): at 10:04

## 2021-04-27 RX ADMIN — Medication 30 MILLIGRAM(S): at 06:15

## 2021-04-27 RX ADMIN — ATORVASTATIN CALCIUM 20 MILLIGRAM(S): 80 TABLET, FILM COATED ORAL at 21:47

## 2021-04-27 RX ADMIN — MEXILETINE HYDROCHLORIDE 200 MILLIGRAM(S): 150 CAPSULE ORAL at 06:15

## 2021-04-27 RX ADMIN — Medication 4 MILLIGRAM(S): at 06:15

## 2021-04-27 RX ADMIN — TIOTROPIUM BROMIDE 1 CAPSULE(S): 18 CAPSULE ORAL; RESPIRATORY (INHALATION) at 09:34

## 2021-04-27 RX ADMIN — Medication 5 MILLIGRAM(S): at 08:09

## 2021-04-27 RX ADMIN — APIXABAN 5 MILLIGRAM(S): 2.5 TABLET, FILM COATED ORAL at 06:15

## 2021-04-27 RX ADMIN — Medication 30 MILLILITER(S): at 21:57

## 2021-04-27 RX ADMIN — Medication 1: at 12:15

## 2021-04-27 RX ADMIN — POLYETHYLENE GLYCOL 3350 17 GRAM(S): 17 POWDER, FOR SOLUTION ORAL at 10:01

## 2021-04-27 RX ADMIN — APIXABAN 5 MILLIGRAM(S): 2.5 TABLET, FILM COATED ORAL at 17:31

## 2021-04-27 RX ADMIN — MEXILETINE HYDROCHLORIDE 200 MILLIGRAM(S): 150 CAPSULE ORAL at 21:47

## 2021-04-27 RX ADMIN — Medication 1 SPRAY(S): at 06:15

## 2021-04-27 RX ADMIN — Medication 30 MILLIGRAM(S): at 17:30

## 2021-04-27 RX ADMIN — MEXILETINE HYDROCHLORIDE 200 MILLIGRAM(S): 150 CAPSULE ORAL at 14:13

## 2021-04-27 RX ADMIN — ROFLUMILAST 250 MICROGRAM(S): 500 TABLET ORAL at 12:17

## 2021-04-27 RX ADMIN — BUDESONIDE AND FORMOTEROL FUMARATE DIHYDRATE 2 PUFF(S): 160; 4.5 AEROSOL RESPIRATORY (INHALATION) at 09:33

## 2021-04-27 RX ADMIN — CHLORHEXIDINE GLUCONATE 1 APPLICATION(S): 213 SOLUTION TOPICAL at 12:17

## 2021-04-27 RX ADMIN — Medication 30 MILLILITER(S): at 10:00

## 2021-04-27 RX ADMIN — PANTOPRAZOLE SODIUM 40 MILLIGRAM(S): 20 TABLET, DELAYED RELEASE ORAL at 06:15

## 2021-04-27 RX ADMIN — BUDESONIDE AND FORMOTEROL FUMARATE DIHYDRATE 2 PUFF(S): 160; 4.5 AEROSOL RESPIRATORY (INHALATION) at 21:50

## 2021-04-27 RX ADMIN — Medication 1: at 17:29

## 2021-04-27 RX ADMIN — CALAMINE AND ZINC OXIDE AND PHENOL 1 APPLICATION(S): 160; 10 LOTION TOPICAL at 10:00

## 2021-04-27 RX ADMIN — Medication 40 MILLIEQUIVALENT(S): at 10:00

## 2021-04-27 NOTE — PROGRESS NOTE ADULT - SUBJECTIVE AND OBJECTIVE BOX
Interval History:  Frequency of bigeminy decreased significantly since dose was increased from BID to TID  Patient expresses better symptom relief but continues to endorse SOB with activity which corresponds to PVCs and bigeminy on telemetry    MEDICATIONS  (STANDING):  apixaban 5 milliGRAM(s) Oral two times a day  atorvastatin 20 milliGRAM(s) Oral at bedtime  budesonide  80 MICROgram(s)/formoterol 4.5 MICROgram(s) Inhaler 2 Puff(s) Inhalation two times a day  calamine/zinc oxide Lotion 1 Application(s) Topical daily  chlorhexidine 2% Cloths 1 Application(s) Topical daily  dextrose 40% Gel 15 Gram(s) Oral once  dextrose 5%. 1000 milliLiter(s) (50 mL/Hr) IV Continuous <Continuous>  dextrose 5%. 1000 milliLiter(s) (100 mL/Hr) IV Continuous <Continuous>  dextrose 50% Injectable 25 Gram(s) IV Push once  dextrose 50% Injectable 12.5 Gram(s) IV Push once  dextrose 50% Injectable 25 Gram(s) IV Push once  diltiazem    Tablet 30 milliGRAM(s) Oral every 12 hours  fluticasone propionate 50 MICROgram(s)/spray Nasal Spray 1 Spray(s) Both Nostrils two times a day  glucagon  Injectable 1 milliGRAM(s) IntraMuscular once  insulin glargine Injectable (LANTUS) 15 Unit(s) SubCutaneous at bedtime  insulin lispro (ADMELOG) corrective regimen sliding scale   SubCutaneous three times a day before meals  methylPREDNISolone 4 milliGRAM(s) Oral daily  mexiletine 200 milliGRAM(s) Oral three times a day  pantoprazole    Tablet 40 milliGRAM(s) Oral before breakfast  polyethylene glycol 3350 17 Gram(s) Oral daily  roflumilast 250 MICROGram(s) Oral daily  tiotropium 18 MICROgram(s) Capsule 1 Capsule(s) Inhalation daily    MEDICATIONS  (PRN):  ALBUTerol    90 MICROgram(s) HFA Inhaler 2 Puff(s) Inhalation every 6 hours PRN Shortness of Breath and/or Wheezing  aluminum hydroxide/magnesium hydroxide/simethicone Suspension 30 milliLiter(s) Oral every 4 hours PRN Dyspepsia  bisacodyl 5 milliGRAM(s) Oral daily PRN Constipation  guaiFENesin   Syrup  (Sugar-Free) 100 milliGRAM(s) Oral every 6 hours PRN Cough  metoclopramide 5 milliGRAM(s) Oral every 6 hours PRN nausea  ondansetron Injectable 4 milliGRAM(s) IV Push every 6 hours PRN Nausea and/or Vomiting    Vital Signs Last 24 Hrs  T(C): 36.9 (27 Apr 2021 06:13), Max: 36.9 (27 Apr 2021 06:13)  T(F): 98.5 (27 Apr 2021 06:13), Max: 98.5 (27 Apr 2021 06:13)  HR: 55 (27 Apr 2021 06:13) (55 - 76)  BP: 129/56 (27 Apr 2021 06:13) (129/56 - 147/61)  BP(mean): 99 (26 Apr 2021 21:41) (99 - 99)  RR: 18 (27 Apr 2021 06:13) (16 - 18)  SpO2: 100% (27 Apr 2021 06:13) (100% - 100%)    Appearance: Normal	  HEENT:   Normal oral mucosa, PERRL, EOMI	  Lymphatic: No lymphadenopathy  Cardiovascular: Normal S1 S2, No JVD, No murmurs, No edema  Respiratory: Lungs clear to auscultation	  Psychiatry: A & O x 3, Mood & affect appropriate  Gastrointestinal:  Soft, Non-tender, + BS	  Skin: No rashes, No ecchymoses, No cyanosis	  Neurologic: Non-focal  Extremities: Normal range of motion, No clubbing, cyanosis or edema  Vascular: Peripheral pulses palpable 2+ bilaterally    LABS:	 	    CBC Full  -  ( 27 Apr 2021 07:37 )  WBC Count : 6.15 K/uL  Hemoglobin : 10.8 g/dL  Hematocrit : 34.9 %  Platelet Count - Automated : 266 K/uL  Mean Cell Volume : 74.3 fL  Mean Cell Hemoglobin : 23.0 pg  Mean Cell Hemoglobin Concentration : 30.9 gm/dL  Auto Neutrophil # : x  Auto Lymphocyte # : x  Auto Monocyte # : x  Auto Eosinophil # : x  Auto Basophil # : x  Auto Neutrophil % : x  Auto Lymphocyte % : x  Auto Monocyte % : x  Auto Eosinophil % : x  Auto Basophil % : x    04-27    143  |  105  |  13  ----------------------------<  68<L>  3.5   |  30  |  0.83  04-26    142  |  106  |  17  ----------------------------<  90  4.1   |  27  |  0.94    Ca    8.7      27 Apr 2021 07:37  Ca    8.9      26 Apr 2021 07:29  Phos  3.8     04-27  Phos  3.6     04-26  Mg     2.0     04-27  Mg     2.0     04-26    TPro  6.1  /  Alb  3.5  /  TBili  0.3  /  DBili  x   /  AST  12  /  ALT  11  /  AlkPhos  70  04-27      LIVER FUNCTIONS - ( 27 Apr 2021 07:37 )  Alb: 3.5 g/dL / Pro: 6.1 g/dL / ALK PHOS: 70 U/L / ALT: 11 U/L / AST: 12 U/L / GGT: x

## 2021-04-27 NOTE — PROGRESS NOTE ADULT - ASSESSMENT
This is a 70 year old woman with a pmhx of mod-severe AI and PAF (on Eliquis), tracheobronchomalacia s/p tracheobronchoplasty, lung nodules, severe allergic asthma, adrenal insufficiency, bronchiectasis, T2DM, HTN, CRC s/p colostomy who presented to [facility] ED from pulmonary rehab due to worsening SOB and palpitation. According to the patient, this is different from her typical asthma exacerbation and stated that  her palpitations lead to her sob. Patient believes that she went into" Afib " on Saturday  based on her worsening symptoms. There is no EKG or telemetric strip showing this. She also stated that the palpitation was associated with a band-like tightness around her abdomen, weakness, and dizziness which she has been experiencing for the past year. Pulmonology started her on prednisone 40mg x 5 days given possible asthma exacerbation with plans to restart home medrol after. Ep was consulted for Afib and bradycardia. Upon my evaluation, patient was in SR with frequent PVC and bigeminy HR 70-90s. Patient continues to have PVC and Bigeminy which correlates with her symptom, EP recommends starting mexiletine 200mg po BID    Plan:  - Continuous telemetric monitoring for PVC burden, Afib and bradycardia  - Monitor electrolytes and replete K to 4 and Mg to 2  - Continue Eliquis 5mg po qd for thromboembolic ppx as patient has a [id number] score of 4 (Age, Sex, HTN, T2DM)  - TTE revealed EF of 61%Normal left ventricular systolic function, no WMA, mildly dilated LA, moderate AR  - Continue home diltiazem 30mg po BID and monitor HR and BP  - Continue Mexiletine 200mg PO TID for now, patient was given education material on the medication - have patient take with meals

## 2021-04-27 NOTE — PROGRESS NOTE ADULT - SUBJECTIVE AND OBJECTIVE BOX
[facility] Division of Hospital Medicine  Sarahi Braden MD  Pager [phone]      Patient is a 70y old  Female who presents with a chief complaint of Dyspnea on exertion (27 Apr 2021 11:21)      SUBJECTIVE / OVERNIGHT EVENTS:    pt reports overall feeling better. Had another episode of " chest pressure" this am, found to have PVCs with bigeminy at the time. currently improved. no new complaints     ADDITIONAL REVIEW OF SYSTEMS:    RESPIRATORY: No cough, wheezing, chills or hemoptysis; No shortness of breath  CARDIOVASCULAR: No chest pain, palpitations, dizziness, or leg swelling  GASTROINTESTINAL: No abdominal or epigastric pain. No nausea, vomiting, or hematemesis; No diarrhea or constipation. No melena or hematochezia.    MEDICATIONS  (STANDING):  apixaban 5 milliGRAM(s) Oral two times a day  atorvastatin 20 milliGRAM(s) Oral at bedtime  budesonide  80 MICROgram(s)/formoterol 4.5 MICROgram(s) Inhaler 2 Puff(s) Inhalation two times a day  calamine/zinc oxide Lotion 1 Application(s) Topical daily  chlorhexidine 2% Cloths 1 Application(s) Topical daily  dextrose 40% Gel 15 Gram(s) Oral once  dextrose 5%. 1000 milliLiter(s) (50 mL/Hr) IV Continuous <Continuous>  dextrose 5%. 1000 milliLiter(s) (100 mL/Hr) IV Continuous <Continuous>  dextrose 50% Injectable 25 Gram(s) IV Push once  dextrose 50% Injectable 12.5 Gram(s) IV Push once  dextrose 50% Injectable 25 Gram(s) IV Push once  diltiazem    Tablet 30 milliGRAM(s) Oral every 12 hours  fluticasone propionate 50 MICROgram(s)/spray Nasal Spray 1 Spray(s) Both Nostrils two times a day  glucagon  Injectable 1 milliGRAM(s) IntraMuscular once  insulin glargine Injectable (LANTUS) 15 Unit(s) SubCutaneous at bedtime  insulin lispro (ADMELOG) corrective regimen sliding scale   SubCutaneous three times a day before meals  methylPREDNISolone 4 milliGRAM(s) Oral daily  mexiletine 200 milliGRAM(s) Oral three times a day  pantoprazole    Tablet 40 milliGRAM(s) Oral before breakfast  polyethylene glycol 3350 17 Gram(s) Oral daily  roflumilast 250 MICROGram(s) Oral daily  tiotropium 18 MICROgram(s) Capsule 1 Capsule(s) Inhalation daily    MEDICATIONS  (PRN):  ALBUTerol    90 MICROgram(s) HFA Inhaler 2 Puff(s) Inhalation every 6 hours PRN Shortness of Breath and/or Wheezing  aluminum hydroxide/magnesium hydroxide/simethicone Suspension 30 milliLiter(s) Oral every 4 hours PRN Dyspepsia  bisacodyl 5 milliGRAM(s) Oral daily PRN Constipation  guaiFENesin   Syrup  (Sugar-Free) 100 milliGRAM(s) Oral every 6 hours PRN Cough  metoclopramide 5 milliGRAM(s) Oral every 6 hours PRN nausea  ondansetron Injectable 4 milliGRAM(s) IV Push every 6 hours PRN Nausea and/or Vomiting      CAPILLARY BLOOD GLUCOSE      POCT Blood Glucose.: 176 mg/dL (27 Apr 2021 11:31)  POCT Blood Glucose.: 114 mg/dL (27 Apr 2021 07:47)  POCT Blood Glucose.: 64 mg/dL (27 Apr 2021 07:21)  POCT Blood Glucose.: 169 mg/dL (26 Apr 2021 21:32)  POCT Blood Glucose.: 154 mg/dL (26 Apr 2021 17:31)    I&O's Summary    26 Apr 2021 07:01  -  27 Apr 2021 07:00  --------------------------------------------------------  IN: 150 mL / OUT: 1200 mL / NET: -1050 mL        PHYSICAL EXAM:  Vital Signs Last 24 Hrs  T(C): 36.8 (27 Apr 2021 12:14), Max: 36.9 (27 Apr 2021 06:13)  T(F): 98.2 (27 Apr 2021 12:14), Max: 98.5 (27 Apr 2021 06:13)  HR: 64 (27 Apr 2021 12:14) (55 - 75)  BP: 131/42 (27 Apr 2021 12:14) (129/56 - 140/41)  BP(mean): 99 (26 Apr 2021 21:41) (99 - 99)  RR: 18 (27 Apr 2021 12:14) (16 - 18)  SpO2: 100% (27 Apr 2021 12:14) (100% - 100%)    CONSTITUTIONAL: NAD,  EYES: PERRLA; conjunctiva and sclera clear  ENMT: Moist oral mucosa, no pharyngeal injection or exudates;   NECK: Supple, no palpable masses;  RESPIRATORY: Normal respiratory effort; lungs are clear to auscultation bilaterally  CARDIOVASCULAR: Regular rate and rhythm, normal S1 and S2, no murmur/rub/gallop; No lower extremity edema; Peripheral pulses are 2+ bilaterally  ABDOMEN: Nontender to palpation, normoactive bowel sounds, no rebound/guarding;   MUSCLOSKELETAL:   no clubbing or cyanosis of digits; no joint swelling or tenderness to palpation  PSYCH: A+O to person, place, and time; affect appropriate  NEUROLOGY: CN 2-12 are intact and symmetric; no gross sensory deficits;   SKIN: No rashes;       LABS:                        10.8   6.15  )-----------( 266      ( 27 Apr 2021 07:37 )             34.9     04-27    143  |  105  |  13  ----------------------------<  68<L>  3.5   |  30  |  0.83    Ca    8.7      27 Apr 2021 07:37  Phos  3.8     04-27  Mg     2.0     04-27    TPro  6.1  /  Alb  3.5  /  TBili  0.3  /  DBili  x   /  AST  12  /  ALT  11  /  AlkPhos  70  04-27      CARDIAC MARKERS ( 26 Apr 2021 10:41 )  x     / x     / 240 U/L / x     / 2.2 ng/mL            RADIOLOGY & ADDITIONAL TESTS:  Results Reviewed:   Imaging Personally Reviewed:  Electrocardiogram Personally Reviewed:    COORDINATION OF CARE:  Care Discussed with Consultants/Other Providers [Y/N]:  Prior or Outpatient Records Reviewed [Y/N]:

## 2021-04-28 LAB
ANION GAP SERPL CALC-SCNC: 11 MMOL/L — SIGNIFICANT CHANGE UP (ref 7–14)
BUN SERPL-MCNC: 15 MG/DL — SIGNIFICANT CHANGE UP (ref 7–23)
CALCIUM SERPL-MCNC: 8.9 MG/DL — SIGNIFICANT CHANGE UP (ref 8.4–10.5)
CHLORIDE SERPL-SCNC: 105 MMOL/L — SIGNIFICANT CHANGE UP (ref 98–107)
CO2 SERPL-SCNC: 27 MMOL/L — SIGNIFICANT CHANGE UP (ref 22–31)
CREAT SERPL-MCNC: 0.83 MG/DL — SIGNIFICANT CHANGE UP (ref 0.5–1.3)
GLUCOSE SERPL-MCNC: 106 MG/DL — HIGH (ref 70–99)
HCT VFR BLD CALC: 35.2 % — SIGNIFICANT CHANGE UP (ref 34.5–45)
HGB BLD-MCNC: 10.8 G/DL — LOW (ref 11.5–15.5)
MAGNESIUM SERPL-MCNC: 2.2 MG/DL — SIGNIFICANT CHANGE UP (ref 1.6–2.6)
MCHC RBC-ENTMCNC: 23 PG — LOW (ref 27–34)
MCHC RBC-ENTMCNC: 30.7 GM/DL — LOW (ref 32–36)
MCV RBC AUTO: 74.9 FL — LOW (ref 80–100)
NRBC # BLD: 0 /100 WBCS — SIGNIFICANT CHANGE UP
NRBC # FLD: 0 K/UL — SIGNIFICANT CHANGE UP
PHOSPHATE SERPL-MCNC: 3.2 MG/DL — SIGNIFICANT CHANGE UP (ref 2.5–4.5)
PLATELET # BLD AUTO: 286 K/UL — SIGNIFICANT CHANGE UP (ref 150–400)
POTASSIUM SERPL-MCNC: 4.1 MMOL/L — SIGNIFICANT CHANGE UP (ref 3.5–5.3)
POTASSIUM SERPL-SCNC: 4.1 MMOL/L — SIGNIFICANT CHANGE UP (ref 3.5–5.3)
RBC # BLD: 4.7 M/UL — SIGNIFICANT CHANGE UP (ref 3.8–5.2)
RBC # FLD: 17.2 % — HIGH (ref 10.3–14.5)
SODIUM SERPL-SCNC: 143 MMOL/L — SIGNIFICANT CHANGE UP (ref 135–145)
WBC # BLD: 7.07 K/UL — SIGNIFICANT CHANGE UP (ref 3.8–10.5)
WBC # FLD AUTO: 7.07 K/UL — SIGNIFICANT CHANGE UP (ref 3.8–10.5)

## 2021-04-28 PROCEDURE — 99232 SBSQ HOSP IP/OBS MODERATE 35: CPT

## 2021-04-28 RX ADMIN — BUDESONIDE AND FORMOTEROL FUMARATE DIHYDRATE 2 PUFF(S): 160; 4.5 AEROSOL RESPIRATORY (INHALATION) at 10:04

## 2021-04-28 RX ADMIN — Medication 30 MILLIGRAM(S): at 18:59

## 2021-04-28 RX ADMIN — APIXABAN 5 MILLIGRAM(S): 2.5 TABLET, FILM COATED ORAL at 06:03

## 2021-04-28 RX ADMIN — MEXILETINE HYDROCHLORIDE 200 MILLIGRAM(S): 150 CAPSULE ORAL at 21:44

## 2021-04-28 RX ADMIN — CALAMINE AND ZINC OXIDE AND PHENOL 1 APPLICATION(S): 160; 10 LOTION TOPICAL at 13:29

## 2021-04-28 RX ADMIN — POLYETHYLENE GLYCOL 3350 17 GRAM(S): 17 POWDER, FOR SOLUTION ORAL at 13:28

## 2021-04-28 RX ADMIN — INSULIN GLARGINE 15 UNIT(S): 100 INJECTION, SOLUTION SUBCUTANEOUS at 21:44

## 2021-04-28 RX ADMIN — ATORVASTATIN CALCIUM 20 MILLIGRAM(S): 80 TABLET, FILM COATED ORAL at 21:44

## 2021-04-28 RX ADMIN — TIOTROPIUM BROMIDE 1 CAPSULE(S): 18 CAPSULE ORAL; RESPIRATORY (INHALATION) at 10:04

## 2021-04-28 RX ADMIN — CHLORHEXIDINE GLUCONATE 1 APPLICATION(S): 213 SOLUTION TOPICAL at 13:29

## 2021-04-28 RX ADMIN — MEXILETINE HYDROCHLORIDE 200 MILLIGRAM(S): 150 CAPSULE ORAL at 06:03

## 2021-04-28 RX ADMIN — Medication 4 MILLIGRAM(S): at 06:03

## 2021-04-28 RX ADMIN — APIXABAN 5 MILLIGRAM(S): 2.5 TABLET, FILM COATED ORAL at 18:59

## 2021-04-28 RX ADMIN — BUDESONIDE AND FORMOTEROL FUMARATE DIHYDRATE 2 PUFF(S): 160; 4.5 AEROSOL RESPIRATORY (INHALATION) at 21:44

## 2021-04-28 RX ADMIN — Medication 2: at 17:58

## 2021-04-28 RX ADMIN — Medication 5 MILLIGRAM(S): at 18:10

## 2021-04-28 RX ADMIN — MEXILETINE HYDROCHLORIDE 200 MILLIGRAM(S): 150 CAPSULE ORAL at 13:30

## 2021-04-28 RX ADMIN — PANTOPRAZOLE SODIUM 40 MILLIGRAM(S): 20 TABLET, DELAYED RELEASE ORAL at 06:03

## 2021-04-28 RX ADMIN — INSULIN GLARGINE 15 UNIT(S): 100 INJECTION, SOLUTION SUBCUTANEOUS at 00:36

## 2021-04-28 RX ADMIN — ROFLUMILAST 250 MICROGRAM(S): 500 TABLET ORAL at 13:28

## 2021-04-28 RX ADMIN — Medication 5 MILLIGRAM(S): at 00:45

## 2021-04-28 RX ADMIN — Medication 5 MILLIGRAM(S): at 13:28

## 2021-04-28 NOTE — PROGRESS NOTE ADULT - PROBLEM SELECTOR PLAN 6
Diet: Carb consistent  DVT: Eliquis  Dispo: likely home
Diet: Carb consistent  DVT: Eliquis  Dispo: likely home
Diet: Carb consistent  DVT: Eliquis  Dispo: dc home if cleared by EP
Diet: Carb consistent  DVT: Eliquis  Dispo: likely home

## 2021-04-28 NOTE — PROGRESS NOTE ADULT - ASSESSMENT
This is a 70 year old woman with a pmhx of mod-severe AI and PAF (on Eliquis), tracheobronchomalacia s/p tracheobronchoplasty, lung nodules, severe allergic asthma, adrenal insufficiency, bronchiectasis, T2DM, HTN, CRC s/p colostomy who presented to [facility] ED from pulmonary rehab due to worsening SOB and palpitation. According to the patient, this is different from her typical asthma exacerbation and stated that  her palpitations lead to her sob. Patient believes that she went into" Afib " on Saturday  based on her worsening symptoms. There is no EKG or telemetric strip showing this. She also stated that the palpitation was associated with a band-like tightness around her abdomen, weakness, and dizziness which she has been experiencing for the past year. Pulmonology started her on prednisone 40mg x 5 days given possible asthma exacerbation with plans to restart home medrol after. Ep was consulted for Afib and bradycardia. Upon my evaluation, patient was in SR with frequent PVC and bigeminy HR 70-90s. Patient continues to have PVC and Bigeminy which correlates with her symptom, EP recommends starting mexiletine 200mg po BID    Plan:  - Continuous telemetric monitoring for PVC burden, Afib and bradycardia  - Monitor electrolytes and replete K to 4 and Mg to 2  - Continue Eliquis 5mg po qd for thromboembolic ppx as patient has a [id number] score of 4 (Age, Sex, HTN, T2DM)  - TTE revealed EF of 61%Normal left ventricular systolic function, no WMA, mildly dilated LA, moderate AR  - Continue home diltiazem 30mg po BID and monitor HR and BP  - Continue Mexiletine 200mg PO TID for now, patient was given education material on the medication - have patient take with meals   - Okay from EP perspective to be discharge, with follow-up with Dr. Aragon on May 13, 2:00 pm   - Continue care per primary team     Silvia Bailey PA-C  Patient to be staff with attending. Please awaiting attending addendum

## 2021-04-28 NOTE — PROGRESS NOTE ADULT - PROBLEM SELECTOR PLAN 4
-currently on lantus 20U qhs, Humalog sliding scale and victoza at home  - will hold victoza while inpatient  - c/w lantus 15U qhs as patient reports reduced appetite recently and admelog sliding scale with meals. Can uptitrate if PO intake improves  - FS TIDAC
FS acceptable   - on lantus 20U qhs, Humalog sliding scale and victoza at home  - will hold victoza while inpatient  - c/w lantus 15U qhs as patient reports reduced appetite recently and admelog sliding scale with meals. Can uptitrate if PO intake improves  - FS TIDAC
-currently on lantus 20U qhs, Humalog sliding scale and victoza at home  - will hold victoza while inpatient  - c/w lantus 15U qhs as patient reports reduced appetite recently and admelog sliding scale with meals. Can uptitrate if PO intake improves  - FS TIDAC
FS 62 this am. denies hypoglycemic symptoms.   - on lantus 20U qhs, Humalog sliding scale and victoza at home  - will hold victoza while inpatient  - c/w lantus 15U qhs as patient reports reduced appetite recently and admelog sliding scale with meals. Can uptitrate if PO intake improves  - FS TIDAC

## 2021-04-28 NOTE — PROGRESS NOTE ADULT - PROBLEM SELECTOR PLAN 3
Patient with long standing history of atrial fibrillation. On cardizem at home. Evaluated by EP at [facility] and was considering an ablation but held off because she is high risk (would need intubation).   - Currently HR 70-80s on tele  - C/w cardizem  - C/w Eliquis
Patient with long standing history of atrial fibrillation. On cardizem at home. Evaluated by EP at [facility] and was considering an ablation but held off because she is high risk (would need intubation).   - EP consulted   - Currently HR 70-80s on tele  - C/w cardizem  - C/w Eliquis
Patient with long standing history of atrial fibrillation. On cardizem at home. Evaluated by EP at [facility] and was considering an ablation but held off because she is high risk (would need intubation).   - Currently HR 70-80s on tele  - C/w cardizem  - C/w Eliquis
Patient with long standing history of atrial fibrillation. On cardizem at home. Evaluated by EP at [facility] and was considering an ablation but held off because she is high risk (would need intubation).   - EP consulted   - Currently HR 70-80s on tele  - Holding cardizem for now given bradycardic episode  - Echo ordered
Patient with long standing history of atrial fibrillation. On cardizem at home. Evaluated by EP at [facility] and was considering an ablation but held off because she is high risk (would need intubation).   - EP consulted   - Currently HR 70-80s on tele  - C/w cardizem  - C/w Eliquis
Patient with long standing history of atrial fibrillation. On cardizem at home. Evaluated by EP at [facility] and was considering an ablation but held off because she is high risk (would need intubation).   - EP consulted   - Currently HR 70-80s on tele  - C/w cardizem  - C/w Eliquis
Patient with long standing history of atrial fibrillation. On cardizem at home. Evaluated by EP at [facility] and was considering an ablation but held off because she is high risk (would need intubation).   - EP consulted   - Currently HR 70-80s on tele  - C/w cardizem  - C/w Eliquis
Patient with long standing history of atrial fibrillation. On cardizem at home. Evaluated by EP at [facility] and was considering an ablation but held off because she is high risk (would need intubation).   - EP consulted   - Currently HR 70-80s on tele  - C/w cardizem  - Echo ordered

## 2021-04-28 NOTE — PROGRESS NOTE ADULT - SUBJECTIVE AND OBJECTIVE BOX
[facility] Division of Hospital Medicine  Sarahi Brdaen MD  Pager [phone]      Patient is a 70y old  Female who presents with a chief complaint of Dyspnea on exertion (28 Apr 2021 12:31)      SUBJECTIVE / OVERNIGHT EVENTS:    no acute event. feels better today    ADDITIONAL REVIEW OF SYSTEMS:    RESPIRATORY: No cough, wheezing, chills or hemoptysis; No shortness of breath  CARDIOVASCULAR: No chest pain, palpitations, dizziness, or leg swelling  GASTROINTESTINAL: No abdominal or epigastric pain. No nausea, vomiting, or hematemesis; No diarrhea or constipation. No melena or hematochezia.      MEDICATIONS  (STANDING):  apixaban 5 milliGRAM(s) Oral two times a day  atorvastatin 20 milliGRAM(s) Oral at bedtime  budesonide  80 MICROgram(s)/formoterol 4.5 MICROgram(s) Inhaler 2 Puff(s) Inhalation two times a day  calamine/zinc oxide Lotion 1 Application(s) Topical daily  chlorhexidine 2% Cloths 1 Application(s) Topical daily  dextrose 40% Gel 15 Gram(s) Oral once  dextrose 5%. 1000 milliLiter(s) (50 mL/Hr) IV Continuous <Continuous>  dextrose 5%. 1000 milliLiter(s) (100 mL/Hr) IV Continuous <Continuous>  dextrose 50% Injectable 25 Gram(s) IV Push once  dextrose 50% Injectable 12.5 Gram(s) IV Push once  dextrose 50% Injectable 25 Gram(s) IV Push once  diltiazem    Tablet 30 milliGRAM(s) Oral every 12 hours  fluticasone propionate 50 MICROgram(s)/spray Nasal Spray 1 Spray(s) Both Nostrils two times a day  glucagon  Injectable 1 milliGRAM(s) IntraMuscular once  insulin glargine Injectable (LANTUS) 15 Unit(s) SubCutaneous at bedtime  insulin lispro (ADMELOG) corrective regimen sliding scale   SubCutaneous three times a day before meals  methylPREDNISolone 4 milliGRAM(s) Oral daily  mexiletine 200 milliGRAM(s) Oral three times a day  pantoprazole    Tablet 40 milliGRAM(s) Oral before breakfast  polyethylene glycol 3350 17 Gram(s) Oral daily  roflumilast 250 MICROGram(s) Oral daily  tiotropium 18 MICROgram(s) Capsule 1 Capsule(s) Inhalation daily    MEDICATIONS  (PRN):  ALBUTerol    90 MICROgram(s) HFA Inhaler 2 Puff(s) Inhalation every 6 hours PRN Shortness of Breath and/or Wheezing  aluminum hydroxide/magnesium hydroxide/simethicone Suspension 30 milliLiter(s) Oral every 4 hours PRN Dyspepsia  bisacodyl 5 milliGRAM(s) Oral daily PRN Constipation  guaiFENesin   Syrup  (Sugar-Free) 100 milliGRAM(s) Oral every 6 hours PRN Cough  metoclopramide 5 milliGRAM(s) Oral every 6 hours PRN nausea  ondansetron Injectable 4 milliGRAM(s) IV Push every 6 hours PRN Nausea and/or Vomiting      CAPILLARY BLOOD GLUCOSE      POCT Blood Glucose.: 144 mg/dL (28 Apr 2021 12:45)  POCT Blood Glucose.: 90 mg/dL (28 Apr 2021 08:51)  POCT Blood Glucose.: 204 mg/dL (27 Apr 2021 22:31)  POCT Blood Glucose.: 166 mg/dL (27 Apr 2021 17:15)    I&O's Summary      PHYSICAL EXAM:  Vital Signs Last 24 Hrs  T(C): 37 (28 Apr 2021 13:22), Max: 37 (28 Apr 2021 13:22)  T(F): 98.6 (28 Apr 2021 13:22), Max: 98.6 (28 Apr 2021 13:22)  HR: 60 (28 Apr 2021 13:22) (59 - 68)  BP: 146/57 (28 Apr 2021 13:22) (134/50 - 146/57)  BP(mean): 66 (28 Apr 2021 05:00) (66 - 73)  RR: 17 (28 Apr 2021 13:22) (17 - 18)  SpO2: 99% (28 Apr 2021 13:22) (98% - 99%)    CONSTITUTIONAL: NAD,  EYES: PERRLA; conjunctiva and sclera clear  ENMT: Moist oral mucosa, no pharyngeal injection or exudates;   NECK: Supple, no palpable masses;  RESPIRATORY: Normal respiratory effort; lungs are clear to auscultation bilaterally  CARDIOVASCULAR: Regular rate and rhythm, normal S1 and S2, no murmur/rub/gallop; No lower extremity edema; Peripheral pulses are 2+ bilaterally  ABDOMEN: Nontender to palpation, normoactive bowel sounds, no rebound/guarding;   MUSCLOSKELETAL:   no clubbing or cyanosis of digits; no joint swelling or tenderness to palpation  PSYCH: A+O to person, place, and time; affect appropriate  NEUROLOGY: CN 2-12 are intact and symmetric; no gross sensory deficits;   SKIN: No rashes;     LABS:                        10.8   7.07  )-----------( 286      ( 28 Apr 2021 08:56 )             35.2     04-28    143  |  105  |  15  ----------------------------<  106<H>  4.1   |  27  |  0.83    Ca    8.9      28 Apr 2021 08:56  Phos  3.2     04-28  Mg     2.2     04-28    TPro  6.1  /  Alb  3.5  /  TBili  0.3  /  DBili  x   /  AST  12  /  ALT  11  /  AlkPhos  70  04-27                RADIOLOGY & ADDITIONAL TESTS:  Results Reviewed:   Imaging Personally Reviewed:  Electrocardiogram Personally Reviewed:    COORDINATION OF CARE:  Care Discussed with Consultants/Other Providers [Y/N]:  Prior or Outpatient Records Reviewed [Y/N]:

## 2021-04-28 NOTE — PROGRESS NOTE ADULT - SUBJECTIVE AND OBJECTIVE BOX
Subjective: Admits to occasionally feeling palpitation but states that it has greatly improved. Mild SOB with ambulation. Denies HA, lightheadedness, dizziness, CP, abdominal pain, and N/V.      Interval events:  - p/w with SOB and palpitation which was associated with weakness, bandlike tightness around the abdomin and dizziness   - Pulm was consulted and patient was started on prednisone for possible asthma exacerbation   - EP was consulted to Afib and bradycardia. Patient was in NSR with frequent PVC and Bigeminy on the ED telemetry.  - TTE revealed EF of 61%Normal left ventricular systolic function, no WMA, mildly dilated LA, moderate AR  - Per pulmonary note patient refused prednisone as patient prefers medro  - Patient continues to have PVC and Bigeminy which correlates with her symptom, EP recommends starting mexiletine 200mg po BID which was increased to TID. Frequency of bigeminy decreased significantly since dose was increased from BID to TID    MEDICATIONS  (STANDING):  apixaban 5 milliGRAM(s) Oral two times a day  atorvastatin 20 milliGRAM(s) Oral at bedtime  budesonide  80 MICROgram(s)/formoterol 4.5 MICROgram(s) Inhaler 2 Puff(s) Inhalation two times a day  calamine/zinc oxide Lotion 1 Application(s) Topical daily  chlorhexidine 2% Cloths 1 Application(s) Topical daily  dextrose 40% Gel 15 Gram(s) Oral once  dextrose 5%. 1000 milliLiter(s) (50 mL/Hr) IV Continuous <Continuous>  dextrose 5%. 1000 milliLiter(s) (100 mL/Hr) IV Continuous <Continuous>  dextrose 50% Injectable 25 Gram(s) IV Push once  dextrose 50% Injectable 12.5 Gram(s) IV Push once  dextrose 50% Injectable 25 Gram(s) IV Push once  diltiazem    Tablet 30 milliGRAM(s) Oral every 12 hours  fluticasone propionate 50 MICROgram(s)/spray Nasal Spray 1 Spray(s) Both Nostrils two times a day  glucagon  Injectable 1 milliGRAM(s) IntraMuscular once  insulin glargine Injectable (LANTUS) 15 Unit(s) SubCutaneous at bedtime  insulin lispro (ADMELOG) corrective regimen sliding scale   SubCutaneous three times a day before meals  methylPREDNISolone 4 milliGRAM(s) Oral daily  mexiletine 200 milliGRAM(s) Oral three times a day  pantoprazole    Tablet 40 milliGRAM(s) Oral before breakfast  polyethylene glycol 3350 17 Gram(s) Oral daily  roflumilast 250 MICROGram(s) Oral daily  tiotropium 18 MICROgram(s) Capsule 1 Capsule(s) Inhalation daily    MEDICATIONS  (PRN):  ALBUTerol    90 MICROgram(s) HFA Inhaler 2 Puff(s) Inhalation every 6 hours PRN Shortness of Breath and/or Wheezing  aluminum hydroxide/magnesium hydroxide/simethicone Suspension 30 milliLiter(s) Oral every 4 hours PRN Dyspepsia  bisacodyl 5 milliGRAM(s) Oral daily PRN Constipation  guaiFENesin   Syrup  (Sugar-Free) 100 milliGRAM(s) Oral every 6 hours PRN Cough  metoclopramide 5 milliGRAM(s) Oral every 6 hours PRN nausea  ondansetron Injectable 4 milliGRAM(s) IV Push every 6 hours PRN Nausea and/or Vomiting    Vital Signs Last 24 Hrs  T(C): 36.6 (28 Apr 2021 05:00), Max: 36.6 (27 Apr 2021 17:28)  T(F): 97.8 (28 Apr 2021 05:00), Max: 97.9 (27 Apr 2021 21:00)  HR: 59 (28 Apr 2021 05:00) (59 - 68)  BP: 134/50 (28 Apr 2021 05:00) (134/50 - 141/79)  BP(mean): 66 (28 Apr 2021 05:00) (66 - 73)  RR: 17 (28 Apr 2021 05:00) (17 - 18)  SpO2: 99% (28 Apr 2021 05:00) (98% - 99%)  I&O's Detail    Physical exam:  GENERAL: lying in bed, following command, speaking in full sentences, in NAD  HEART: S1S2 RRR with a 1/6 murmur  PULMONARY wheezing initially which cleared after patient coughed. normal respiratory effort.   ABDOMEN: Bowel sounds present, soft, NDNT  EXTREMITIES:  Warm, well -perfused, no pedal edema, distal pulses present  NEUROLOGICAL:AOx3    TELEMETERIC: SR with HR 50-70s with some PVC no Bigeminy noted                             10.8   7.07  )-----------( 286      ( 28 Apr 2021 08:56 )             35.2       04-28    143  |  105  |  15  ----------------------------<  106<H>  4.1   |  27  |  0.83    Ca    8.9      28 Apr 2021 08:56  Phos  3.2     04-28  Mg     2.2     04-28    TPro  6.1  /  Alb  3.5  /  TBili  0.3  /  DBili  x   /  AST  12  /  ALT  11  /  AlkPhos  70  04-27        < from: TTE Echo Complete w/o Contrast w/ Doppler (01.04.21 @ 15:47) >    --------------------------------------------------------------------------------  CONCLUSIONS:     1. The aortic valve is tricuspid and mildly thickned. No hemodynamically significant aortic stenosis. There is moderate-to-severe aortic regurgitation.   2. No other significant valvular disease.   3. The right ventricle is normal in size. Right ventricular systolic function is normal.   4. There is mild concentric left ventricular hypertrophy. There are no regional wall motion abnormalities seen. Left ventricular systolic function is hyperdynamic with a calculated ejection fraction of >75%.   5. The aortic root is dilated measuring 4.00 cm.   6. No pericardial effusion.      < from: Transthoracic Echocardiogram (04.23.21 @ 09:26) >  DIMENSIONS:  Dimensions:     Normal Values:  LA:     4.1 cm    2.0 - 4.0 cm  Ao:     3.3 cm    2.0 - 3.8 cm  SEPTUM: 0.7 cm    0.6 - 1.2 cm  PWT:    0.7 cm0.6 - 1.1 cm  LVIDd:  4.0 cm    3.0 - 5.6 cm  LVIDs:  2.7 cm    1.8 - 4.0 cm  Derived Variables:  LVMI: 45 g/m2  RWT: 0.35  Fractional short: 33 %  Ejection Fraction (Rogericholtz): 61 %  ------------------------------------------------------------------------  OBSERVATIONS:  Mitral Valve: Mitral annular calcification, otherwise  normal mitral valve. Minimal mitral regurgitation.  Aortic Root: Normal aortic root.  Aortic Valve: Calcified trileaflet aortic valve with normal  opening. Moderate aortic regurgitation.  Vena contracta  width about  0.4 cm.  Left Atrium: Mildly dilated left atrium.  LA volume index =  35 cc/m2.  Left Ventricle: Normal left ventricular systolic function.  No segmental wall motion abnormalities. Normal left  ventricular internal dimensions and wall thicknesses. Mild  diastolic dysfunction (Stage I).  Right Heart: Normal right atrium. Normal right ventricular  size and function. Normal tricuspid valve. Minimal  tricuspid regurgitation. Normal pulmonic valve. Minimal  pulmonic regurgitation.  Pericardium/PleuraNormal pericardium with no pericardial  effusion.  ------------------------------------------------------------------------  CONCLUSIONS:  1. Mitral annular calcification, otherwise normal mitral  valve. Minimal mitral regurgitation.  2. Calcified trileaflet aortic valve with normal opening.  Moderate aortic regurgitation.  Vena contracta width about  0.4 cm.  3. Mildly dilated left atrium.  LA volume index = 35 cc/m2.  4. Normal left ventricular internaldimensions and wall  thicknesses.  5. Normal left ventricular systolic function. No segmental  wall motion abnormalities.  6. Mild diastolic dysfunction (Stage I).  7. Normal right ventricular size and function.  ------------------------------------------------------------------------  Confirmed on  4/23/2021 - 10:42:27 by Sergio Arizmendi M.D.,  [facility], [place]  ----------------------------------------------------------    < end of copied text >

## 2021-04-28 NOTE — PROGRESS NOTE ADULT - PROBLEM SELECTOR PROBLEM 1
Palpitations

## 2021-04-28 NOTE — PROGRESS NOTE ADULT - PROBLEM SELECTOR PLAN 5
Patient currently on medrol 4mg daily for chronic adrenal insufficiency  - C/w medrol
Patient currently on medrol 4mg daily for chronic adrenal insufficiency  - C/w medrol
Patient currently on medrol 4mg daily for chronic adrenal insufficiency  - As per pulm will start on prednisone 40mg for 5 days for possible asthma exacerbation and then will resume home dose.
Patient currently on medrol 4mg daily for chronic adrenal insufficiency  - C/w medrol

## 2021-04-28 NOTE — PROGRESS NOTE ADULT - PROBLEM SELECTOR PLAN 1
Patient with long standing sensation of palpitations with increasing frequency over past 2 days. Also with SOB. Is SOB 2/2 worsening AR or palpitations. As per EP, symptoms likely 2/2 palpitations. Repeat echo with stable AR.   - remains in NSR with PVCs on telemetry  - EP recommending mexelitine and monitoring on tele for 2-3 days  - C/w tele monitoring
Patient with long standing sensation of palpitations with increasing frequency over past 2 days.  - associated with lightheadedness and dyspnea but no chest pain  - troponins indeterminate, will trend to peak  - remains in NSR with PVCs on telemetry, continue tele monitoring  - previous TTE showing mod-severe AR, will obtain new TTE
Patient with long standing sensation of palpitations with increasing frequency over past 2 days. Also with SOB. Is SOB 2/2 worsening AR or palpitations. As per EP, symptoms likely 2/2 palpitations. Repeat echo with stable AR.   - remains in NSR with PVCs on telemetry  - EP recommending mexelitine and monitoring on tele for 2-3 days  - C/w tele monitoring
Patient with long standing sensation of palpitations with increasing frequency over past 2 days.  - associated with lightheadedness and dyspnea but no chest pain  - troponins indeterminate, will trend to peak  - remains in NSR with PVCs on telemetry, continue tele monitoring  - previous TTE showing mod-severe AR, will obtain new TTE  - Pending TTE.
d/c frequent PVCs  -Repeat echo with stable AR.   - started on mexelitine to 200 tid as per EP recs, may further increase dose if still symptomatic   - C/w tele monitoring
d/c frequent PVCs  -Repeat echo with stable AR.   - started on mexelitine to 200 tid as per EP recs, may further increase dose if still symptomatic   - C/w tele monitoring
Patient with long standing sensation of palpitations with increasing frequency over past 2 days. Also with SOB. Is SOB 2/2 worsening AR or palpitations. As per EP, symptoms likely 2/2 palpitations. Repeat echo with stable AR.   - remains in NSR with PVCs on telemetry  - EP recommending mexelitine and monitoring on tele for 2-3 days  - C/w tele monitoring
Patient with long standing sensation of palpitations with increasing frequency over past 2 days. Also with SOB. Is SOB 2/2 worsening AR or palpitations. As per EP, symptoms likely 2/2 palpitations. Repeat echo with stable AR.   - remains in NSR with PVCs on telemetry  - Increased mexelitine to 200 tid as per EP recs  - C/w tele monitoring

## 2021-04-28 NOTE — PROGRESS NOTE ADULT - PROBLEM SELECTOR PLAN 2
patient with COPD on Spiriva, daliresp and breo ellipta at home  - following with Dr. Allison outpatient  - c/w tiotropium daily  - c/w daliresp (roflumilast)  - albuterol PRN  - As per pulm, start prednisone 40mg qd for 5 day course however patient refusing prednisone. She says it does not work for her. Will place back on home medrol.
patient with COPD on Spiriva, daliresp and breo ellipta at home  - following with Dr. Allison outpatient  - c/w tiotropium daily  - c/w daliresp (roflumilast)  - albuterol PRN  - As per pulm, start prednisone 40mg qd for 5 day course however patient refusing prednisone. She says it does not work for her. Will place back on home medrol.
patient with COPD on Spiriva, daliresp and breo ellipta at home  - following with Dr. Allison outpatient  - c/w tiotropium daily  - c/w daliresp (roflumilast)  - albuterol PRN  - As per pulm, start prednisone 40mg qd for 5 day course
patient with COPD on Spiriva, daliresp and breo ellipta at home  - following with Dr. Allison outpatient  - c/w tiotropium daily  - c/w daliresp (roflumilast)  - albuterol PRN  - As per pulm, start prednisone 40mg qd for 5 day course however patient refusing prednisone. She says it does not work for her. Will place back on home medrol.

## 2021-04-28 NOTE — PROGRESS NOTE ADULT - PROBLEM SELECTOR PROBLEM 5
Adrenal insufficiency

## 2021-04-29 ENCOUNTER — TRANSCRIPTION ENCOUNTER (OUTPATIENT)
Age: 71
End: 2021-04-29

## 2021-04-29 ENCOUNTER — APPOINTMENT (OUTPATIENT)
Dept: PULMONOLOGY | Facility: CLINIC | Age: 71
End: 2021-04-29

## 2021-04-29 VITALS
HEART RATE: 66 BPM | OXYGEN SATURATION: 100 % | SYSTOLIC BLOOD PRESSURE: 137 MMHG | RESPIRATION RATE: 18 BRPM | TEMPERATURE: 98 F | DIASTOLIC BLOOD PRESSURE: 43 MMHG

## 2021-04-29 LAB
ANION GAP SERPL CALC-SCNC: 12 MMOL/L — SIGNIFICANT CHANGE UP (ref 7–14)
BUN SERPL-MCNC: 14 MG/DL — SIGNIFICANT CHANGE UP (ref 7–23)
CALCIUM SERPL-MCNC: 9.3 MG/DL — SIGNIFICANT CHANGE UP (ref 8.4–10.5)
CHLORIDE SERPL-SCNC: 103 MMOL/L — SIGNIFICANT CHANGE UP (ref 98–107)
CO2 SERPL-SCNC: 26 MMOL/L — SIGNIFICANT CHANGE UP (ref 22–31)
CREAT SERPL-MCNC: 0.81 MG/DL — SIGNIFICANT CHANGE UP (ref 0.5–1.3)
GLUCOSE SERPL-MCNC: 113 MG/DL — HIGH (ref 70–99)
HCT VFR BLD CALC: 38.7 % — SIGNIFICANT CHANGE UP (ref 34.5–45)
HGB BLD-MCNC: 11.8 G/DL — SIGNIFICANT CHANGE UP (ref 11.5–15.5)
MAGNESIUM SERPL-MCNC: 2.2 MG/DL — SIGNIFICANT CHANGE UP (ref 1.6–2.6)
MCHC RBC-ENTMCNC: 22.9 PG — LOW (ref 27–34)
MCHC RBC-ENTMCNC: 30.5 GM/DL — LOW (ref 32–36)
MCV RBC AUTO: 75.1 FL — LOW (ref 80–100)
NRBC # BLD: 0 /100 WBCS — SIGNIFICANT CHANGE UP
NRBC # FLD: 0 K/UL — SIGNIFICANT CHANGE UP
PHOSPHATE SERPL-MCNC: 3.2 MG/DL — SIGNIFICANT CHANGE UP (ref 2.5–4.5)
PLATELET # BLD AUTO: 304 K/UL — SIGNIFICANT CHANGE UP (ref 150–400)
POTASSIUM SERPL-MCNC: 4 MMOL/L — SIGNIFICANT CHANGE UP (ref 3.5–5.3)
POTASSIUM SERPL-SCNC: 4 MMOL/L — SIGNIFICANT CHANGE UP (ref 3.5–5.3)
RBC # BLD: 5.15 M/UL — SIGNIFICANT CHANGE UP (ref 3.8–5.2)
RBC # FLD: 17.5 % — HIGH (ref 10.3–14.5)
SODIUM SERPL-SCNC: 141 MMOL/L — SIGNIFICANT CHANGE UP (ref 135–145)
WBC # BLD: 7.21 K/UL — SIGNIFICANT CHANGE UP (ref 3.8–10.5)
WBC # FLD AUTO: 7.21 K/UL — SIGNIFICANT CHANGE UP (ref 3.8–10.5)

## 2021-04-29 PROCEDURE — 99239 HOSP IP/OBS DSCHRG MGMT >30: CPT

## 2021-04-29 PROCEDURE — 99232 SBSQ HOSP IP/OBS MODERATE 35: CPT

## 2021-04-29 RX ORDER — INSULIN GLARGINE 100 [IU]/ML
20 INJECTION, SOLUTION SUBCUTANEOUS
Qty: 0 | Refills: 0 | DISCHARGE

## 2021-04-29 RX ORDER — DILTIAZEM HCL 120 MG
2 CAPSULE, EXT RELEASE 24 HR ORAL
Qty: 0 | Refills: 0 | DISCHARGE
Start: 2021-04-29 | End: 2021-05-28

## 2021-04-29 RX ORDER — METOCLOPRAMIDE HCL 10 MG
1 TABLET ORAL
Qty: 20 | Refills: 0
Start: 2021-04-29 | End: 2021-05-03

## 2021-04-29 RX ORDER — MEXILETINE HYDROCHLORIDE 150 MG/1
1 CAPSULE ORAL
Qty: 90 | Refills: 0
Start: 2021-04-29 | End: 2021-05-28

## 2021-04-29 RX ORDER — FLUTICASONE PROPIONATE 50 MCG
1 SPRAY, SUSPENSION NASAL
Qty: 60 | Refills: 0
Start: 2021-04-29 | End: 2021-05-28

## 2021-04-29 RX ORDER — DILTIAZEM HCL 120 MG
1 CAPSULE, EXT RELEASE 24 HR ORAL
Qty: 60 | Refills: 0
Start: 2021-04-29 | End: 2021-05-28

## 2021-04-29 RX ORDER — HEPARIN SODIUM 5000 [USP'U]/ML
300 INJECTION INTRAVENOUS; SUBCUTANEOUS ONCE
Refills: 0 | Status: DISCONTINUED | OUTPATIENT
Start: 2021-04-29 | End: 2021-04-29

## 2021-04-29 RX ADMIN — TIOTROPIUM BROMIDE 1 CAPSULE(S): 18 CAPSULE ORAL; RESPIRATORY (INHALATION) at 09:39

## 2021-04-29 RX ADMIN — ROFLUMILAST 250 MICROGRAM(S): 500 TABLET ORAL at 12:30

## 2021-04-29 RX ADMIN — CHLORHEXIDINE GLUCONATE 1 APPLICATION(S): 213 SOLUTION TOPICAL at 12:31

## 2021-04-29 RX ADMIN — APIXABAN 5 MILLIGRAM(S): 2.5 TABLET, FILM COATED ORAL at 17:06

## 2021-04-29 RX ADMIN — Medication 30 MILLIGRAM(S): at 17:06

## 2021-04-29 RX ADMIN — POLYETHYLENE GLYCOL 3350 17 GRAM(S): 17 POWDER, FOR SOLUTION ORAL at 12:30

## 2021-04-29 RX ADMIN — Medication 1: at 12:30

## 2021-04-29 RX ADMIN — Medication 4 MILLIGRAM(S): at 06:06

## 2021-04-29 RX ADMIN — APIXABAN 5 MILLIGRAM(S): 2.5 TABLET, FILM COATED ORAL at 06:06

## 2021-04-29 RX ADMIN — MEXILETINE HYDROCHLORIDE 200 MILLIGRAM(S): 150 CAPSULE ORAL at 13:43

## 2021-04-29 RX ADMIN — MEXILETINE HYDROCHLORIDE 200 MILLIGRAM(S): 150 CAPSULE ORAL at 06:06

## 2021-04-29 RX ADMIN — PANTOPRAZOLE SODIUM 40 MILLIGRAM(S): 20 TABLET, DELAYED RELEASE ORAL at 06:06

## 2021-04-29 RX ADMIN — BUDESONIDE AND FORMOTEROL FUMARATE DIHYDRATE 2 PUFF(S): 160; 4.5 AEROSOL RESPIRATORY (INHALATION) at 09:39

## 2021-04-29 RX ADMIN — CALAMINE AND ZINC OXIDE AND PHENOL 1 APPLICATION(S): 160; 10 LOTION TOPICAL at 12:30

## 2021-04-29 RX ADMIN — Medication 300 UNIT(S): at 16:50

## 2021-04-29 RX ADMIN — Medication 5 MILLIGRAM(S): at 14:13

## 2021-04-29 NOTE — DISCHARGE NOTE PROVIDER - HOSPITAL COURSE
Ms. Bloom is a 70 year old female with hx of tracheobronchomalacia s/p tracheobronchoplasty, lung nodules, severe allergic asthma, adrenal insufficiency, bronchiectasis, DM II, HTN, CRC s/p colostomy, mod-severe AI and PAF (on Eliquis) now presenting with palpitations and dyspnea on exertion.    Palpitations.    Plan: d/c frequent PVCs  -Repeat echo with stable AR.   - started on mexelitine to 200 tid as per EP recs, may further increase dose if still symptomatic   - C/w tele monitoring.      COPD (chronic obstructive pulmonary disease).  Plan: patient with COPD on Spiriva, daliresp and breo ellipta at home  - following with Dr. Allison outpatient  - c/w tiotropium daily  - c/w daliresp (roflumilast)  - albuterol PRN  - As per pulm, start prednisone 40mg qd for 5 day course however patient refusing prednisone. She says it does not work for her. Will place back on home medrol.     Atrial fibrillation.  Plan: Patient with long standing history of atrial fibrillation. On cardizem at home. Evaluated by EP at [facility] and was considering an ablation but held off because she is high risk (would need intubation).   - Currently HR 70-80s on tele  - C/w cardizem  - C/w Eliquis.     Diabetes.  Plan: FS acceptable   - on lantus 20U qhs, Humalog sliding scale and victoza at home  - will hold victoza while inpatient  - c/w lantus 15U qhs as patient reports reduced appetite recently and admelog sliding scale with meals. Can uptitrate if PO intake improves  - FS TIDAC.     Adrenal insufficiency.  Plan: Patient currently on medrol 4mg daily for chronic adrenal insufficiency  - C/w medrol.     Need for prophylactic measure. Plan: Diet: Carb consistent  DVT: Eliquis  Dispo: dc home if cleared by EP.    Dispo: Patient seen and evaluated. Afebrile, vital signs stable. Patient denies any chest pain, dyspnea, nausea, vomiting, or any other c/o's or concerns at this time . Patient case reviewed and discussed with Attending Physician Dr Braden who agrees the patient is medically stable and optimized for discharge today 4/29 with outpatient follow up with Dr Aragon . All medications were reviewed and prescriptions were sent to mutually agreed upon pharmacy.  Reviewed discharge medications with patient and attending. All new medications requiring new prescriptions were sent to the pharmacy of patient's choice. Reviewed need for prescription for previous home medications and new prescriptions sent if requested. Medically cleared/stable for discharge as per   with appropriate follow up. Patient understands and agrees with plan of care.       Ms. Bloom is a 70 year old female with hx of tracheobronchomalacia s/p tracheobronchoplasty, lung nodules, severe allergic asthma, adrenal insufficiency, bronchiectasis, DM II, HTN, CRC s/p colostomy, mod-severe AI and PAF (on Eliquis) now presenting with palpitations and dyspnea on exertion.    Palpitations.    Plan: d/c frequent PVCs  -Repeat echo with stable AR.   - started on mexelitine to 200 tid as per EP recs, may further increase dose if still symptomatic   - C/w tele monitoring.      COPD (chronic obstructive pulmonary disease).  Plan: patient with COPD on Spiriva, daliresp and breo ellipta at home  - following with Dr. Allison outpatient  - c/w tiotropium daily  - c/w daliresp (roflumilast)  - albuterol PRN  - As per pulm, start prednisone 40mg qd for 5 day course however patient refusing prednisone. She says it does not work for her. Will place back on home medrol.     Atrial fibrillation.  Plan: Patient with long standing history of atrial fibrillation. On cardizem at home. Evaluated by EP at [facility] and was considering an ablation but held off because she is high risk (would need intubation).   - Currently HR 70-80s on tele  - C/w cardizem  - C/w Eliquis.     Diabetes.  Plan: FS acceptable   - on lantus 20U qhs, Humalog sliding scale and victoza at home  - will hold victoza while inpatient  - c/w lantus 15U qhs as patient reports reduced appetite recently and admelog sliding scale with meals. Can uptitrate if PO intake improves  - FS TIDAC.     Adrenal insufficiency.  Plan: Patient currently on medrol 4mg daily for chronic adrenal insufficiency  - C/w medrol.     Need for prophylactic measure. Plan: Diet: Carb consistent  DVT: Eliquis  Dispo: dc home if cleared by EP.    Dispo: Patient seen and evaluated. Afebrile, vital signs stable. Patient denies any chest pain, dyspnea, nausea, vomiting, or any other c/o's or concerns at this time . Patient case reviewed and discussed with Attending Physician Dr Braden who agrees the patient is medically stable and optimized for discharge today 4/29 with outpatient follow up with EP Cardiologist Dr Aragon, as scheduled and Primary Care Physician Dr Honeycutt in 1 week for ongoing medical management. All medications were reviewed and prescriptions were sent to mutually agreed upon pharmacy. All questions and concerns addressed and answered to patient's satisfaction. Patient understands and agrees with plan of care.

## 2021-04-29 NOTE — PROGRESS NOTE ADULT - ASSESSMENT
This is a 70 year old woman with a pmhx of mod-severe AI and PAF (on Eliquis), tracheobronchomalacia s/p tracheobronchoplasty, lung nodules, severe allergic asthma, adrenal insufficiency, bronchiectasis, T2DM, HTN, CRC s/p colostomy who presented to [facility] ED from pulmonary rehab due to worsening SOB and palpitation. According to the patient, this is different from her typical asthma exacerbation and stated that  her palpitations lead to her sob. Patient believes that she went into" Afib " on Saturday  based on her worsening symptoms. There is no EKG or telemetric strip showing this. She also stated that the palpitation was associated with a band-like tightness around her abdomen, weakness, and dizziness which she has been experiencing for the past year. Pulmonology started her on prednisone 40mg x 5 days given possible asthma exacerbation with plans to restart home medrol after. Ep was consulted for Afib and bradycardia. Upon my evaluation, patient was in SR with frequent PVC and bigeminy HR 70-90s. Patient continues to have PVC and Bigeminy which correlates with her symptom, EP recommends starting mexiletine 200mg po BID    Plan:  - Continuous telemetric monitoring for PVC burden, Afib and bradycardia  - Monitor electrolytes and replete K to 4 and Mg to 2  - Continue Eliquis 5mg po qd for thromboembolic ppx as patient has a [id number] score of 4 (Age, Sex, HTN, T2DM)  - TTE revealed EF of 61%Normal left ventricular systolic function, no WMA, mildly dilated LA, moderate AR  - Continue home diltiazem 30mg po BID and monitor HR and BP  - Continue Mexiletine 200mg PO TID for now, patient was given education material on the medication - have patient take with meals   - Okay from EP perspective to be discharge, with follow-up with Dr. Aragon on May 13, 2:00 pm   - Continue care per primary team     Silvia Bailey PA-C  Patient to be staff with attending. Please awaiting attending addendum

## 2021-04-29 NOTE — PROGRESS NOTE ADULT - REASON FOR ADMISSION
Dyspnea on exertion

## 2021-04-29 NOTE — PROGRESS NOTE ADULT - SUBJECTIVE AND OBJECTIVE BOX
Subjective:  Patient stated that she felt 100% better since her admission. She still occasionally feels palpitations Denies HA, lightheadedness, dizziness, CP, abdominal pain, and N/V.      Interval events  - p/w with SOB and palpitation which was associated with weakness, bandlike tightness around the abdomin and dizziness   - Pulm was consulted and patient was started on prednisone for possible asthma exacerbation   - EP was consulted to Afib and bradycardia. Patient was in NSR with frequent PVC and Bigeminy on the ED telemetry.  - TTE revealed EF of 61%Normal left ventricular systolic function, no WMA, mildly dilated LA, moderate AR  - Per pulmonary note patient refused prednisone as patient prefers medrol  - Patient continues to have PVC and Bigeminy which correlates with her symptom, EP recommends starting mexiletine 200mg po BID which was increased to TID. Frequency of bigeminy decreased significantly since dose was increased from BID to TID    MEDICATIONS  (STANDING):  apixaban 5 milliGRAM(s) Oral two times a day  atorvastatin 20 milliGRAM(s) Oral at bedtime  budesonide  80 MICROgram(s)/formoterol 4.5 MICROgram(s) Inhaler 2 Puff(s) Inhalation two times a day  calamine/zinc oxide Lotion 1 Application(s) Topical daily  chlorhexidine 2% Cloths 1 Application(s) Topical daily  dextrose 40% Gel 15 Gram(s) Oral once  dextrose 5%. 1000 milliLiter(s) (50 mL/Hr) IV Continuous <Continuous>  dextrose 5%. 1000 milliLiter(s) (100 mL/Hr) IV Continuous <Continuous>  dextrose 50% Injectable 25 Gram(s) IV Push once  dextrose 50% Injectable 12.5 Gram(s) IV Push once  dextrose 50% Injectable 25 Gram(s) IV Push once  diltiazem    Tablet 30 milliGRAM(s) Oral every 12 hours  fluticasone propionate 50 MICROgram(s)/spray Nasal Spray 1 Spray(s) Both Nostrils two times a day  glucagon  Injectable 1 milliGRAM(s) IntraMuscular once  insulin glargine Injectable (LANTUS) 15 Unit(s) SubCutaneous at bedtime  insulin lispro (ADMELOG) corrective regimen sliding scale   SubCutaneous three times a day before meals  methylPREDNISolone 4 milliGRAM(s) Oral daily  mexiletine 200 milliGRAM(s) Oral three times a day  pantoprazole    Tablet 40 milliGRAM(s) Oral before breakfast  polyethylene glycol 3350 17 Gram(s) Oral daily  roflumilast 250 MICROGram(s) Oral daily  tiotropium 18 MICROgram(s) Capsule 1 Capsule(s) Inhalation daily    MEDICATIONS  (PRN):  ALBUTerol    90 MICROgram(s) HFA Inhaler 2 Puff(s) Inhalation every 6 hours PRN Shortness of Breath and/or Wheezing  aluminum hydroxide/magnesium hydroxide/simethicone Suspension 30 milliLiter(s) Oral every 4 hours PRN Dyspepsia  bisacodyl 5 milliGRAM(s) Oral daily PRN Constipation  guaiFENesin   Syrup  (Sugar-Free) 100 milliGRAM(s) Oral every 6 hours PRN Cough  metoclopramide 5 milliGRAM(s) Oral every 6 hours PRN nausea  ondansetron Injectable 4 milliGRAM(s) IV Push every 6 hours PRN Nausea and/or Vomiting    Vital Signs Last 24 Hrs  T(C): 36.9 (29 Apr 2021 05:18), Max: 37.1 (28 Apr 2021 21:00)  T(F): 98.5 (29 Apr 2021 05:18), Max: 98.8 (28 Apr 2021 21:00)  HR: 66 (29 Apr 2021 05:18) (60 - 72)  BP: 150/62 (29 Apr 2021 05:18) (126/57 - 188/79)  BP(mean): 86 (29 Apr 2021 05:18) (76 - 101)  RR: 16 (29 Apr 2021 05:18) (16 - 18)  SpO2: 99% (29 Apr 2021 05:18) (97% - 100%)  I&O's Detail      Physical exam:  GENERAL: lying in bed, following command, speaking in full sentences, in NAD  HEART: S1S2 RRR with a 1/6 murmur  PULMONARY wheezing initially which cleared after patient coughed. normal respiratory effort.   ABDOMEN: Bowel sounds present, soft, NDNT  EXTREMITIES:  Warm, well -perfused, no pedal edema, distal pulses present  NEUROLOGICAL:AOx3    TELEMETERIC: SR with HR 50-70s with some PVC no Bigeminy noted                           11.8   7.21  )-----------( 304      ( 29 Apr 2021 06:29 )             38.7       04-29    141  |  103  |  14  ----------------------------<  113<H>  4.0   |  26  |  0.81    Ca    9.3      29 Apr 2021 06:29  Phos  3.2     04-29  Mg     2.2     04-29    < from: TTE Echo Complete w/o Contrast w/ Doppler (01.04.21 @ 15:47) >    --------------------------------------------------------------------------------  CONCLUSIONS:     1. The aortic valve is tricuspid and mildly thickned. No hemodynamically significant aortic stenosis. There is moderate-to-severe aortic regurgitation.   2. No other significant valvular disease.   3. The right ventricle is normal in size. Right ventricular systolic function is normal.   4. There is mild concentric left ventricular hypertrophy. There are no regional wall motion abnormalities seen. Left ventricular systolic function is hyperdynamic with a calculated ejection fraction of >75%.   5. The aortic root is dilated measuring 4.00 cm.   6. No pericardial effusion.      < from: Transthoracic Echocardiogram (04.23.21 @ 09:26) >  DIMENSIONS:  Dimensions:     Normal Values:  LA:     4.1 cm    2.0 - 4.0 cm  Ao:     3.3 cm    2.0 - 3.8 cm  SEPTUM: 0.7 cm    0.6 - 1.2 cm  PWT:    0.7 cm0.6 - 1.1 cm  LVIDd:  4.0 cm    3.0 - 5.6 cm  LVIDs:  2.7 cm    1.8 - 4.0 cm  Derived Variables:  LVMI: 45 g/m2  RWT: 0.35  Fractional short: 33 %  Ejection Fraction (Tirsotz): 61 %  ------------------------------------------------------------------------  OBSERVATIONS:  Mitral Valve: Mitral annular calcification, otherwise  normal mitral valve. Minimal mitral regurgitation.  Aortic Root: Normal aortic root.  Aortic Valve: Calcified trileaflet aortic valve with normal  opening. Moderate aortic regurgitation.  Vena contracta  width about  0.4 cm.  Left Atrium: Mildly dilated left atrium.  LA volume index =  35 cc/m2.  Left Ventricle: Normal left ventricular systolic function.  No segmental wall motion abnormalities. Normal left  ventricular internal dimensions and wall thicknesses. Mild  diastolic dysfunction (Stage I).  Right Heart: Normal right atrium. Normal right ventricular  size and function. Normal tricuspid valve. Minimal  tricuspid regurgitation. Normal pulmonic valve. Minimal  pulmonic regurgitation.  Pericardium/PleuraNormal pericardium with no pericardial  effusion.  ------------------------------------------------------------------------  CONCLUSIONS:  1. Mitral annular calcification, otherwise normal mitral  valve. Minimal mitral regurgitation.  2. Calcified trileaflet aortic valve with normal opening.  Moderate aortic regurgitation.  Vena contracta width about  0.4 cm.  3. Mildly dilated left atrium.  LA volume index = 35 cc/m2.  4. Normal left ventricular internaldimensions and wall  thicknesses.  5. Normal left ventricular systolic function. No segmental  wall motion abnormalities.  6. Mild diastolic dysfunction (Stage I).  7. Normal right ventricular size and function.  ------------------------------------------------------------------------  Confirmed on  4/23/2021 - 10:42:27 by Sergio Arizmendi M.D.,  [facility], [place]  ----------------------------------------------------------    < end of copied text >

## 2021-04-29 NOTE — PROGRESS NOTE ADULT - NSICDXPILOT_GEN_ALL_CORE
[place]

## 2021-04-29 NOTE — PROGRESS NOTE ADULT - ATTENDING COMMENTS
70 year old woman with a pmhx of mod-severe AI and PAF (on Eliquis), tracheobronchomalacia s/p tracheobronchoplasty, lung nodules, severe allergic asthma, adrenal insufficiency, bronchiectasis, T2DM, HTN, CRC s/p colostomy who presented to [facility] ED from pulmonary rehab due to worsening SOB and palpitation. According to the patient, this is different from her typical asthma exacerbation and stated that  her palpitations lead to her sob. Patient believes that she went into" Afib " on Saturday  based on her worsening symptoms. There is no EKG or telemetric strip showing this. She also stated that the palpitation was associated with a band-like tightness around her abdomen, weakness, and dizziness which she has been experiencing for the past year. Pulmonology started her on prednisone 40mg x 5 days given possible asthma exacerbation with plans to restart home medrol after. Ep was consulted for Afib and bradycardia. Upon my evaluation, patient was in SR with frequent left pap muscle PVC and bigeminy HR 70-90s. Plan for mexilitine and will follow.
70 year old woman with a pmhx of mod-severe AI and PAF (on Eliquis), tracheobronchomalacia s/p tracheobronchoplasty, lung nodules, severe allergic asthma, adrenal insufficiency, bronchiectasis, T2DM, HTN, CRC s/p colostomy who presented to [facility] ED from pulmonary rehab due to worsening SOB and palpitation. According to the patient, this is different from her typical asthma exacerbation and stated that  her palpitations lead to her sob. Patient believes that she went into" Afib " on Saturday  based on her worsening symptoms. There is no EKG or telemetric strip showing this. She also stated that the palpitation was associated with a band-like tightness around her abdomen, weakness, and dizziness which she has been experiencing for the past year. Pulmonology started her on prednisone 40mg x 5 days given possible asthma exacerbation with plans to restart home medrol after. Ep was consulted for Afib and bradycardia. Upon my evaluation, patient was in SR with frequent PVC and bigeminy HR 70-90s. Patient continues to have PVC and Bigeminy which correlates with her symptom, starting mexiletine 200mg po BID- will increase to tid. Will follow.
70 year old woman with a pmhx of mod-severe AI and PAF (on Eliquis), tracheobronchomalacia s/p tracheobronchoplasty, lung nodules, severe allergic asthma, adrenal insufficiency, bronchiectasis, T2DM, HTN, CRC s/p colostomy who presented to [facility] ED from pulmonary rehab due to worsening SOB and palpitation. According to the patient, this is different from her typical asthma exacerbation and stated that  her palpitations lead to her sob. Patient believes that she went into" Afib " on Saturday  based on her worsening symptoms. There is no EKG or telemetric strip showing this. She also stated that the palpitation was associated with a band-like tightness around her abdomen, weakness, and dizziness which she has been experiencing for the past year. Pulmonology started her on prednisone 40mg x 5 days given possible asthma exacerbation with plans to restart home medrol after. Ep was consulted for Afib and bradycardia. PVC burden decreased, now on mexiletine 200mg po TID- feeling better. Will follow as outpt.
70 year old woman with a pmhx of mod-severe AI and PAF (on Eliquis), tracheobronchomalacia s/p tracheobronchoplasty, lung nodules, severe allergic asthma, adrenal insufficiency, bronchiectasis, T2DM, HTN, CRC s/p colostomy who presented to [facility] ED from pulmonary rehab due to worsening SOB and palpitation. According to the patient, this is different from her typical asthma exacerbation and stated that  her palpitations lead to her sob. Patient believes that she went into" Afib " on Saturday  based on her worsening symptoms. There is no EKG or telemetric strip showing this. She also stated that the palpitation was associated with a band-like tightness around her abdomen, weakness, and dizziness which she has been experiencing for the past year. Pulmonology started her on prednisone 40mg x 5 days given possible asthma exacerbation with plans to restart home medrol after. Ep was consulted for Afib and bradycardia. Upon my evaluation, patient was in SR with frequent PVC and bigeminy HR 70-90s. Patient continues to have PVC and Bigeminy which correlates with her symptom, now on mexiletine 200mg po TID- feeling better. Will follow.
70 year old woman with a pmhx of mod-severe AI and PAF (on Eliquis), tracheobronchomalacia s/p tracheobronchoplasty, lung nodules, severe allergic asthma, adrenal insufficiency, bronchiectasis, T2DM, HTN, CRC s/p colostomy who presented to [facility] ED from pulmonary rehab due to worsening SOB and palpitation. According to the patient, this is different from her typical asthma exacerbation and stated that  her palpitations lead to her sob. Patient believes that she went into" Afib " on Saturday  based on her worsening symptoms. There is no EKG or telemetric strip showing this. She also stated that the palpitation was associated with a band-like tightness around her abdomen, weakness, and dizziness which she has been experiencing for the past year. Pulmonology started her on prednisone 40mg x 5 days given possible asthma exacerbation with plans to restart home medrol after. Ep was consulted for Afib and bradycardia. Upon my evaluation, patient was in SR with frequent PVC and bigeminy HR 70-90s. Patient continues to have PVC and Bigeminy which correlates with her symptom, starting mexiletine 200mg po BID- will increase to tid. Doing better today. Will follow.
70 year old woman with a pmhx of mod-severe AI and PAF (on Eliquis), tracheobronchomalacia s/p tracheobronchoplasty, lung nodules, severe allergic asthma, adrenal insufficiency, bronchiectasis, T2DM, HTN, CRC s/p colostomy who presented to [facility] ED from pulmonary rehab due to worsening SOB and palpitation. According to the patient, this is different from her typical asthma exacerbation and stated that  her palpitations lead to her sob. Patient believes that she went into" Afib " on Saturday  based on her worsening symptoms. There is no EKG or telemetric strip showing this. She also stated that the palpitation was associated with a band-like tightness around her abdomen, weakness, and dizziness which she has been experiencing for the past year. Pulmonology started her on prednisone 40mg x 5 days given possible asthma exacerbation with plans to restart home medrol after. Ep was consulted for Afib and bradycardia. Upon my evaluation, patient was in SR with frequent PVC and bigeminy HR 70-90s. Patient continues to have PVC and Bigeminy which correlates with her symptom, starting mexiletine 200mg po BID. Will follow.
Stable from pulmonary perspective.  Cardiac work up per cardiology.  Pulmonary team to sign off.  Outpatient follow up with Dr. Allison.

## 2021-04-29 NOTE — DISCHARGE NOTE NURSING/CASE MANAGEMENT/SOCIAL WORK - PATIENT PORTAL LINK FT
You can access the FollowMyHealth Patient Portal offered by [facility] by registering at the following website: [email or web address]. By joining [employer]’s FollowMyHealth portal, you will also be able to view your health information using other applications (apps) compatible with our system.

## 2021-04-29 NOTE — CHART NOTE - NSCHARTNOTEFT_GEN_A_CORE
pt seen and examined. vitals, labs reviewed. pt is stable for discharge home today. plan discussed with EP and pt in detail, who are in agreement.     total time spent on dc 41min

## 2021-04-29 NOTE — DISCHARGE NOTE PROVIDER - NSDCMRMEDTOKEN_GEN_ALL_CORE_FT
apixaban 5 mg oral tablet: 1 tab(s) orally every 12 hours  Breo Ellipta 200 mcg-25 mcg/inh inhalation powder: 1 puff(s) inhaled once a day  Crestor 5 mg oral tablet: 1 tab(s) orally once a day  Daliresp 250 mcg oral tablet: 1 tab(s) orally once a day  Dupixent 300 mg/2 mL subcutaneous solution: 1  subcutaneous every 2 weeks  HumaLOG 100 units/mL subcutaneous solution: Sliding Scale  ipratropium-albuterol 0.5 mg-2.5 mg/3 mLinhalation solution: 3 milliliter(s) inhaled every 6 hours  Lantus 100 units/mL subcutaneous solution: 20 unit(s) subcutaneous once a day (in the morning)  methylPREDNISolone 4 mg oral tablet: 1 tab(s) orally once a day  metoclopramide 5 mg oral tablet: 1 tab(s) orally every 6 hours, As needed, nausea  mexiletine 200 mg oral capsule: 1 cap(s) orally 3 times a day  Protonix 40 mg oral delayed release tablet: 1 tab(s) orally once a day - 1/2 hour before breakfast  Spiriva 18 mcg inhalation capsule: 1 cap(s) inhaled once a day  Victoza 18 mg/3 mL subcutaneous solution: 1.8 milligram(s) subcutaneous once a day   aluminum hydroxide-magnesium hydroxide 200 mg-200 mg/5 mL oral suspension: 30 milliliter(s) orally every 4 hours, As needed, Dyspepsia  apixaban 5 mg oral tablet: 1 tab(s) orally every 12 hours  Breo Ellipta 200 mcg-25 mcg/inh inhalation powder: 1 puff(s) inhaled once a day  Crestor 5 mg oral tablet: 1 tab(s) orally once a day  Daliresp 250 mcg oral tablet: 1 tab(s) orally once a day  dilTIAZem 30 mg oral tablet: 1 tab(s) orally every 12 hours  Dupixent 300 mg/2 mL subcutaneous solution: 1  subcutaneous every 2 weeks  fluticasone 50 mcg/inh nasal spray: 1 spray(s) nasal 2 times a day  HumaLOG 100 units/mL subcutaneous solution: Sliding Scale  ipratropium-albuterol 0.5 mg-2.5 mg/3 mLinhalation solution: 3 milliliter(s) inhaled every 6 hours  Lantus 100 units/mL subcutaneous solution: 15 unit(s) subcutaneous once a day (in the morning)  methylPREDNISolone 4 mg oral tablet: 1 tab(s) orally once a day  metoclopramide 5 mg oral tablet: 1 tab(s) orally every 6 hours, As needed, nausea  mexiletine 200 mg oral capsule: 1 cap(s) orally 3 times a day  Protonix 40 mg oral delayed release tablet: 1 tab(s) orally once a day - 1/2 hour before breakfast  Spiriva 18 mcg inhalation capsule: 1 cap(s) inhaled once a day  Victoza 18 mg/3 mL subcutaneous solution: 1.8 milligram(s) subcutaneous once a day

## 2021-04-29 NOTE — DISCHARGE NOTE PROVIDER - NSDCCPCAREPLAN_GEN_ALL_CORE_FT
PRINCIPAL DISCHARGE DIAGNOSIS  Diagnosis: Lightheadedness  Assessment and Plan of Treatment:        PRINCIPAL DISCHARGE DIAGNOSIS  Diagnosis: Palpitations  Assessment and Plan of Treatment: You were having palpitations , and were in Afib. You had an echo with preserved heart function. You were evaluated by EP Dr Aragon and started on Mexiletine. Continue this medication and follow up with Dr Aragon 5/13 at 2:00P      SECONDARY DISCHARGE DIAGNOSES  Diagnosis: Diabetes  Assessment and Plan of Treatment: Decrease your home lantus dose to 15U at bedtime. Continue your medication regimen and a consistent carbohydrate diet (Meaning eating the same amount of carbohydrates at the same time each day). Monitor blood glucose levels throughout the day before meals and at bedtime. Record blood sugars and bring to outpatient providers appointment in order to be reviewed by your doctor for management modifications. If your sugars are more than 400 or less than 70 you should contact your PCP immediately. Monitor for signs/symptoms of low blood glucose, such as, dizziness, altered mental status, or cool/clammy skin. In addition, monitor for signs/symptoms of high blood glucose, such as, feeling hot, dry, fatigued, or with increased thirst/urination. Make regular podiatry appointments in order to have feet checked for wounds and uncontrolled toe nail growth to prevent infections, as well as, appointments with an ophthalmologist to monitor your vision.      Diagnosis: COPD (chronic obstructive pulmonary disease)  Assessment and Plan of Treatment: Continue your medications as directed and please follow-up as an outpatient with Dr Allison your Pulmonologist for ongoing medical management, care and recommendations.  Smoking cessation, continue current medications as prescribed. Follow up with your routine physician's appointments.    Diagnosis: Atrial fibrillation  Assessment and Plan of Treatment: Please take your medications as prescribed.  Continue to take your blood thinner as prescribed and follow with your physician to monitor your levels.  Low fat diet, reduce caffeine intake, and exercise at least 30 minutes daily.

## 2021-04-29 NOTE — DISCHARGE NOTE PROVIDER - CARE PROVIDERS DIRECT ADDRESSES
[email or web address]

## 2021-04-30 ENCOUNTER — NON-APPOINTMENT (OUTPATIENT)
Age: 71
End: 2021-04-30

## 2021-05-03 ENCOUNTER — NON-APPOINTMENT (OUTPATIENT)
Age: 71
End: 2021-05-03

## 2021-05-03 ENCOUNTER — APPOINTMENT (OUTPATIENT)
Dept: INTERNAL MEDICINE | Facility: CLINIC | Age: 71
End: 2021-05-03
Payer: MEDICARE

## 2021-05-03 VITALS
OXYGEN SATURATION: 97 % | HEIGHT: 67 IN | SYSTOLIC BLOOD PRESSURE: 141 MMHG | WEIGHT: 140 LBS | DIASTOLIC BLOOD PRESSURE: 78 MMHG | HEART RATE: 89 BPM | TEMPERATURE: 97.8 F | BODY MASS INDEX: 21.97 KG/M2

## 2021-05-03 PROCEDURE — 36415 COLL VENOUS BLD VENIPUNCTURE: CPT

## 2021-05-03 PROCEDURE — 99496 TRANSJ CARE MGMT HIGH F2F 7D: CPT | Mod: 25

## 2021-05-04 LAB
ALBUMIN SERPL ELPH-MCNC: 4.2 G/DL
ALP BLD-CCNC: 90 U/L
ALT SERPL-CCNC: 12 U/L
ANION GAP SERPL CALC-SCNC: 13 MMOL/L
AST SERPL-CCNC: 16 U/L
BILIRUB SERPL-MCNC: 0.2 MG/DL
BUN SERPL-MCNC: 17 MG/DL
CALCIUM SERPL-MCNC: 9.8 MG/DL
CHLORIDE SERPL-SCNC: 103 MMOL/L
CO2 SERPL-SCNC: 24 MMOL/L
CREAT SERPL-MCNC: 0.88 MG/DL
ESTIMATED AVERAGE GLUCOSE: 163 MG/DL
GLUCOSE SERPL-MCNC: 152 MG/DL
HBA1C MFR BLD HPLC: 7.3 %
POTASSIUM SERPL-SCNC: 4 MMOL/L
PROT SERPL-MCNC: 6.9 G/DL
SODIUM SERPL-SCNC: 140 MMOL/L

## 2021-05-05 ENCOUNTER — APPOINTMENT (OUTPATIENT)
Dept: HEART AND VASCULAR | Facility: CLINIC | Age: 71
End: 2021-05-05

## 2021-05-05 ENCOUNTER — APPOINTMENT (OUTPATIENT)
Dept: PULMONOLOGY | Facility: CLINIC | Age: 71
End: 2021-05-05
Payer: MEDICARE

## 2021-05-05 VITALS
DIASTOLIC BLOOD PRESSURE: 63 MMHG | BODY MASS INDEX: 21.97 KG/M2 | TEMPERATURE: 97.7 F | SYSTOLIC BLOOD PRESSURE: 136 MMHG | HEIGHT: 67 IN | RESPIRATION RATE: 16 BRPM | OXYGEN SATURATION: 96 % | HEART RATE: 89 BPM | WEIGHT: 140 LBS

## 2021-05-05 DIAGNOSIS — Z95.828 PRESENCE OF OTHER VASCULAR IMPLANTS AND GRAFTS: ICD-10-CM

## 2021-05-05 PROCEDURE — 99214 OFFICE O/P EST MOD 30 MIN: CPT

## 2021-05-05 NOTE — HISTORY OF PRESENT ILLNESS
[FreeTextEntry1] : Ms. Bloom is a 70 year old female with a history of abnormal CXR/chest CT, allergic rhinitis, severe persistent asthma, bronchiectasis, GERD, COPD, recurrent PNA, PND, s/p tracheoplasty, TBM, and SOB presenting to the office today for a follow up visit. Her chief complaint is SOB\par -she notes recently being at [facility] and discharged on 4/29\par -she notes being hospitalized for 10 days\par -she notes she feels better when she bends over\par -she notes if she talks too much she will be SOB\par -she notes coughing since 5/2/2021\par -she notes the cough is from her heart not lungs\par -she notes a sore throat and has been taking cough drops which have irritated her throat\par -she notes her  bowels are regular\par -she notes her sense of smell and taste are normal\par -she notes feeling like she is going to pass out\par -she notes feeling lightheaded and dizzy\par -she notes her stool is dark\par -she notes seeing an electrophysiologist Dr. Zoya Aragon and told him it feels like there is a band around her waist\par -she notes he told her the cause is from her left ventricle and she is not a candidate for surgery\par -she notes being on Eliquis\par -she notes having PVC bigeminy and was given mexiletine\par -she notes her potassium has been fluctuating\par \par patient denies any headaches, nausea, vomiting, fever, chills, sweats, chest pain, chest pressure, palpitations, wheezing, fatigue, diarrhea, constipation, dysphagia, myalgias, leg swelling, leg pain, itchy eyes, itchy ears, heartburn, reflux or sour taste in the mouth

## 2021-05-05 NOTE — PHYSICAL EXAM
[No Acute Distress] : no acute distress [Normal Oropharynx] : normal oropharynx [Normal Appearance] : normal appearance [No Neck Mass] : no neck mass [Normal Rate/Rhythm] : normal rate/rhythm [Normal S1, S2] : normal s1, s2 [No Murmurs] : no murmurs [No Resp Distress] : no resp distress [Clear to Auscultation Bilaterally] : clear to auscultation bilaterally [No Abnormalities] : no abnormalities [Benign] : benign [Normal Gait] : normal gait [No Clubbing] : no clubbing [No Cyanosis] : no cyanosis [No Edema] : no edema [FROM] : FROM [Normal Color/ Pigmentation] : normal color/ pigmentation [No Focal Deficits] : no focal deficits [Oriented x3] : oriented x3 [Normal Affect] : normal affect [III] : Mallampati Class: III [Kyphosis] : kyphosis [TextBox_54] : 2/6 systolic murmur [TextBox_68] : I:E 1:3; Clear  [TextBox_80] : kyphotic

## 2021-05-05 NOTE — ASSESSMENT
[FreeTextEntry1] : Ms. Bloom is a 70 year old female with a history of mm, rectal CA, AVDz, asthma, allergy, GERD, TBM, s/p multiple pneumonias, who presents with intermittent SOB. (But "this is not her asthma") c/w arrhythmia - no evidence of asthma/PE, ?electrolyte issue\par \par Her chronic SOB which is multifactorial due to:\par - tracheomalacia (s/p tracheoplasty with residual frequent mucus production, though patient is non-compliant with vest therapy)\par - chronic bronchitis\par - asthma\par - allergic rhinitis\par - GERD\par - poor breathing mechanics \par - CAD/AV disease, arrhythmia, electrolyte issue\par \par \par problem 1a: severe persistent asthma -(steroid dependent) - Stable\par -continue Medrol 4 mg qd\par -continue to use albuterol via the nebulizer QID \par -followed by Mucomyst QiD\par -followed by the acapella device/ chest vest therapy \par -(1st) followed by Perforomist via the nebulizer BID\par -(2nd) followed by Budesonide 0.5% via the nebulizer BID \par -continue to use Breo Ellipta 200 at 1 inhalation QD \par -continue to use Spiriva 1 inhalation QD\par -failed Xolair 225 injection; follow up injections every 2 weeks - Dupixent initiated (12.3.19) - to continue 300 every 2 weeks. \par -continue to use Accolate 20 mg BID\par -Continue Daliresp 250 mg QD \par -Information sheet given about prednisone to the patient to be reviewed, this medication is never to be used without consulting the prescribing physician. Proper dietary restraint is necessary specifically salt containing foods, if any reaction may occur should be reported. \par -Asthma is believed to be caused by inherited (genetic) and environmental factor, but its exact cause is unknown. Asthma may be triggered by allergens, lung infections, or irritants in the air. Asthma triggers are different for each person\par -Inhaler technique reviewed as well as oral hygiene techniques reviewed with patient. Avoidance of cold air, extremes of temperature, rescue inhaler should be used before exercise. Order of medication reviewed with patient. Recommended use of a cool mist humidifier in the bedroom. \par \par problem 1B: ?electrolyte issue/anemia\par -Review SMAIS, Magnesium levels\par -Complete CBC and iron studies\par \par problem 2: tracheomalacia, residual bronchomalacia \par -s/p tracheoplasty with Dr. Mt Zapien\par -laser Bronchoscopy pending\par -continue to follow up \par -s/p f/u Dynamic chest CT 10/2019 positive w TBM - repeat \par \par problem 3: chronic bronchitis and mucus clearance\par -continue to use acapella device multiple times daily\par -recommended to use chest vest therapy multiple times daily \par -she is being sent for sputum cultures \par Patient has a chronic cough greater than 6 months, tried and failed manual chest physiotherapy at home, no skilled caregiver available at home to perform manual CPT, tried and failed acapella vibratory physiotherapy, and recommended chest vest therapy \par \par Problem 3A: Multiple infections\par -off Tobramycin BID for 1 month (completed 12/20/19)\par -Complete follow up Sputum culture after completing ABx if needed. \par \par Problem 3B: multi myeloma\par -appointment  on 1/28/2020\par \par problem 4: GERD\par -continue to use Protonix 40 mg before breakfast\par -continue Baclofen 10 mg q-meal\par -Rule of 2s: avoid eating too much, eating too late, eating too spicy, eating two hours before bed\par -Things to avoid including overeating, spicy foods, tight clothing, eating within three hours of bed, this list is not all inclusive. \par -For treatment of reflux, possible options discussed including diet control, H2 blockers, PPIs, as well as coating motility agents discussed as treatment options. Timing of meals and proximity of last meal to sleep were discussed. If symptoms persist, a formal gastrointestinal evaluation is needed. \par \par problem 5: allergic rhinitis \par -continue to use nasal saline\par -continue to use Xyzal 5 mg before bed\par -Environmental measures for allergies were encouraged including mattress and pillow cover, air purifier, and environmental controls. \par \par problem 6: hx of abnormal aortic valve / cardiac health - arrhymthia \par -continue to follow up with Dr. Leahy, AV Disease, AF\par -echocardiogram in June 2018  (Tosin); Kale Tristan for f/u, Ismail\par \par problem 7: colon cancer\par -s/p radiation therapy, surgery \par -continue to use chemotherapy follow up with Dr. King or Dr. Mario\par \par problem 8: poor breathing mechanics\par -Proper breathing techniques were reviewed with an emphasis of exhalation. Patient instructed to breath in for 1 second and out for four seconds. Patient was encouraged to not talk while walking.\par \par problem 9: immunodeficiency\par - Due to the fact that this pt has had more infections than would be expected and immunological blood work is indicated this would include: IgG subclasses, quantitative immunoglobulins, Strep pneumoniae titers as well as Vitamin D levels. Based on this blood work we will be able to decide where the pt needs additional pneumococcal vaccine either Prevnar 13 or pneumovax. Immunology evaluation will also be potentially indicated.\par \par problem 10: r/o immunodeficiency (on Medrol)\par -Due to the fact that this pt has had more infections than would be expected and immunological blood work is indicated this would include: IgG subclasses, quantitative immunoglobulins, Strep pneumoniae titers as well as Vitamin D levels.\par -Based on this blood work we will be able to decide where the pt needs additional pneumococcal vaccine either Prevnar 13 or pneumovax. Immunology evaluation will also be potentially indicated. \par \par problem 11: abnormal chest CT- ? new nodule- likely inflammation \par - F/u PET/CT 4/2019- if changed Bx (Rupali); follow up and re-evaluate as per Rupali\par -questionable DIEUDONNE or HERMELINDO, all sputum is negative \par -CAT scans are the only radiological modality to identify abnormalities w/in the lungs with regards to nodules/masses/lymph nodes. Risks, benefits were reviewed in detail. The guidelines for abnormalities include follow up CT scans at various intervals which could range from 6 weeks to 1 year intervals. If there is a change for the worse then consideration for a biopsy will be considered if you are a candidate. Second opinion evaluation with a thoracic surgeon or an interventional radiologist could be offered. 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 It sometimes develops following a URI, herpes zoster outbreak in pharynx or thyroid or cervical spine injury. However, many patients have no identifiable antecedent explanation. \par \par Problem 14: Health Maintenance/COVID19 Precautions \par - Clean your hands often. Wash your hands often with soap and water for at least 20 seconds, especially after blowing your nose, coughing, or sneezing, or having been in a public place.\par - If soap and water are not available, use a hand  that contains at least 60% alcohol.\par - To the extent possible, avoid touching high-touch surfaces in public places - elevator buttons, door handles, handrails, handshaking with people, etc. Use a tissue or your sleeve to cover your hand or finger if you must touch something.\par - Wash your hands after touching surfaces in public places.\par - Avoid touching your face, nose, eyes, etc.\par - Clean and disinfect your home to remove germs: practice routine cleaning of frequently touched surfaces (for example: tables, doorknobs, light switches, handles, desks, toilets, faucets, sinks & cell phones)\par - Avoid crowds, especially in poorly ventilated spaces. Your risk of exposure to respiratory viruses like COVID-19 may increase in crowded, closed-in settings with little air circulation if there are people in the crowd who are sick. All patients are recommended to practice social distancing and stay at least 6 feet away from others. \par - Avoid all non-essential travel including plane trips, and especially avoid embarking on cruise ships.\par -If COVID-19 is spreading in your community, take extra measures to put distance between yourself and other people to further reduce your risk of being exposed to this new virus.\par -Stay home as much as possible.\par - Consider ways of getting food brought to your house through family, social, or commercial networks\par -Be aware that the virus has been known to live in the air up to 3 hours post exposure, cardboard up to 24 hours post exposure, copper up to 4 hours post exposure, steel and plastic up to 2-3 days post exposure. Risk of transmission from these surfaces are affected by many variables.\par COVID-19 precautionary Immune Support Recommendations:\par -OTC Vitamin C 500mg BID \par -OTC Quercetin 250-500mg BID \par -OTC Zinc 75-100mg per day \par -OTC Melatonin 1 or 2mg a night \par -OTC Vitamin D 1-4000mg per day \par -OTC Tonic Water 8oz per day\par -Water 0.5-1 gallon per day\par Asthma and COVID19:\par You need to make sure your asthma is under control. This often requires the use of inhaled corticosteroids (and sometimes oral corticosteroids). Inhaled corticosteroids do not likely reduce your immune system’s ability to fight infections, but oral corticosteroids may. It is important to use the steps above to protect yourself to limit your exposure to any respiratory virus. \par \par problem 15:  health maintenance \par -s/p flu shot 10/30/2020 - flu shot given in office. \par -recommended strep pneumonia vaccines: Prevnar-13 vaccine, followed by Pneumo vaccine 23 one year following\par -recommended early intervention for URIs\par -recommended regular osteoporosis evaluations\par -recommended early dermatological evaluations\par -recommended after the age of 50 to the age of 70, colonoscopy every 5 years \par \par F/U in 6 weeks - SPI / NIOX\par She is encouraged to call with any changes, concerns, or questions.

## 2021-05-05 NOTE — ADDENDUM
[FreeTextEntry1] : Documented by Dejuan Coppola acting as a scribe for Dr. Eulalio Allison on (05/05/2021).\par \par All medical record entries made by the Scribe were at my, Dr. Eulalio Allison's, direction and personally dictated by me on (05/05/2021). I have reviewed the chart and agree that the record accurately reflects my personal performance of the history, physical exam, assessment and plan. I have also personally directed, reviewed, and agree with the discharge instructions.\par

## 2021-05-06 ENCOUNTER — APPOINTMENT (OUTPATIENT)
Dept: PULMONOLOGY | Facility: CLINIC | Age: 71
End: 2021-05-06

## 2021-05-13 ENCOUNTER — NON-APPOINTMENT (OUTPATIENT)
Age: 71
End: 2021-05-13

## 2021-05-13 ENCOUNTER — APPOINTMENT (OUTPATIENT)
Dept: CARDIOLOGY | Facility: CLINIC | Age: 71
End: 2021-05-13
Payer: MEDICARE

## 2021-05-13 ENCOUNTER — APPOINTMENT (OUTPATIENT)
Dept: ELECTROPHYSIOLOGY | Facility: CLINIC | Age: 71
End: 2021-05-13
Payer: MEDICARE

## 2021-05-13 VITALS
TEMPERATURE: 96.3 F | HEART RATE: 73 BPM | HEIGHT: 67 IN | OXYGEN SATURATION: 99 % | WEIGHT: 140 LBS | DIASTOLIC BLOOD PRESSURE: 71 MMHG | SYSTOLIC BLOOD PRESSURE: 169 MMHG | BODY MASS INDEX: 21.97 KG/M2

## 2021-05-13 VITALS — DIASTOLIC BLOOD PRESSURE: 70 MMHG | SYSTOLIC BLOOD PRESSURE: 130 MMHG

## 2021-05-13 PROCEDURE — 93000 ELECTROCARDIOGRAM COMPLETE: CPT

## 2021-05-13 PROCEDURE — 99215 OFFICE O/P EST HI 40 MIN: CPT

## 2021-05-13 PROCEDURE — 99205 OFFICE O/P NEW HI 60 MIN: CPT

## 2021-05-13 RX ORDER — DUPILUMAB 300 MG/2ML
300 INJECTION, SOLUTION SUBCUTANEOUS
Refills: 0 | Status: DISCONTINUED | COMMUNITY
End: 2021-05-13

## 2021-05-13 RX ORDER — FLUTICASONE FUROATE AND VILANTEROL TRIFENATATE 200; 25 UG/1; UG/1
200-25 POWDER RESPIRATORY (INHALATION) DAILY
Qty: 3 | Refills: 1 | Status: DISCONTINUED | OUTPATIENT
Start: 2017-08-16 | End: 2021-05-13

## 2021-05-13 NOTE — DISCUSSION/SUMMARY
[FreeTextEntry1] : Shirley Bloom is a 71y/o woman with Hx of tracheobronchomalacia s/p tracheobronchoplasty, lung nodules, severe allergic asthma, adrenal insufficiency, bronchiectasis, DM II, HTN, CRC s/p colostomy, mod-severe AI, paroxysmal afib, on Eliquis, and recent hospitalization for palpitations with noted frequent PVCs, now on mexiletine, who presents today for routine f/u. \par \par Impression:\par \par 1. PVCs: EKG performed today to assess for presence of recurrent PVCs and reveals sinus rhythm, no evidence of ventricular ectopy. Marked improvement with mexiletine use. Will resume mexiletine 200mg TID as prescribed. \par \par 2. Paroxysmal afib: no recent afib. Remains on diltiazem and Eliquis as prescribed. \par \par 3. HTN: BP elevated in office, not on antihypertensives. Last BP with Pulm better controlled. Has a visiting nurse who is monitoring her BP at home. Will f/u with PCP. Encouraged heart healthy diet, sodium restriction, and weight loss. Continue regular f/u with Cardiologist for further HTN management.\par \par Will continue f/u with Cardiologist and may RTO as needed or if any new or worsening symptoms or findings occur.

## 2021-05-13 NOTE — HISTORY OF PRESENT ILLNESS
[FreeTextEntry1] : Shirley Bloom is a 71y/o woman with Hx of tracheobronchomalacia s/p tracheobronchoplasty, lung nodules, severe allergic asthma, adrenal insufficiency, bronchiectasis, DM II, HTN, CRC s/p colostomy, mod-severe AI, paroxysmal afib, on Eliquis, and recent hospitalization for palpitations with noted frequent PVCs, now on mexiletine, who presents today for routine f/u. Was seen in the hospital back in April 2021 for palpitations and dyspnea and was noted to have frequent PVCs on EKG. Initiated on mexiletine 200mg TID and since that time has had marked improvement in symptoms. Does have some GI discomfort with mexiletine use but able to tolerate. Denies chest pain, palpitations, SOB, syncope or near syncope.\par

## 2021-05-13 NOTE — CARDIOLOGY SUMMARY
[de-identified] : 4/23/2021: CONCLUSIONS:\par 1. Mitral annular calcification, otherwise normal mitral\par valve. Minimal mitral regurgitation.\par 2. Calcified trileaflet aortic valve with normal opening.\par Moderate aortic regurgitation.  Vena contracta width about\par 0.4 cm.\par 3. Mildly dilated left atrium.  LA volume index = 35 cc/m2.\par 4. Normal left ventricular internal dimensions and wall\par thicknesses.\par 5. Normal left ventricular systolic function. No segmental\par wall motion abnormalities.\par 6. Mild diastolic dysfunction (Stage I).\par 7. Normal right ventricular size and function.

## 2021-05-21 ENCOUNTER — APPOINTMENT (OUTPATIENT)
Dept: COLORECTAL SURGERY | Facility: CLINIC | Age: 71
End: 2021-05-21
Payer: MEDICARE

## 2021-05-21 PROCEDURE — 99212 OFFICE O/P EST SF 10 MIN: CPT

## 2021-05-21 NOTE — HISTORY OF PRESENT ILLNESS
[FreeTextEntry1] : 70-year-old female who is well known to me. 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 She has innumerable medical problems. Recently she was admitted with cardiac arrhythmias which has now been stabilized.\par \par Patient presents today with concerns of an enlarging skin tag in the vicinity of her perineal wound.\par \par Inspection of the perineum reveals a small skin tag in the intergluteal region. This is not scaling or bleeding and I am not concerned. I reassured the patient.\par \par I advised her to call if she believes this is enlarging or bleeding and we will advise her appropriately.

## 2021-05-27 ENCOUNTER — APPOINTMENT (OUTPATIENT)
Dept: CARDIOLOGY | Facility: CLINIC | Age: 71
End: 2021-05-27
Payer: MEDICARE

## 2021-05-27 ENCOUNTER — NON-APPOINTMENT (OUTPATIENT)
Age: 71
End: 2021-05-27

## 2021-05-27 VITALS
OXYGEN SATURATION: 98 % | SYSTOLIC BLOOD PRESSURE: 156 MMHG | HEIGHT: 67 IN | HEART RATE: 72 BPM | RESPIRATION RATE: 16 BRPM | DIASTOLIC BLOOD PRESSURE: 83 MMHG | WEIGHT: 140 LBS | BODY MASS INDEX: 21.97 KG/M2

## 2021-05-27 DIAGNOSIS — Z88.8 ALLERGY STATUS TO OTHER DRUGS, MEDICAMENTS AND BIOLOGICAL SUBSTANCES: ICD-10-CM

## 2021-05-27 PROCEDURE — 99214 OFFICE O/P EST MOD 30 MIN: CPT

## 2021-05-27 PROCEDURE — 93000 ELECTROCARDIOGRAM COMPLETE: CPT

## 2021-05-30 PROBLEM — Z88.8 ASPIRIN ALLERGY: Status: ACTIVE | Noted: 2017-04-03

## 2021-06-01 ENCOUNTER — INPATIENT (INPATIENT)
Facility: HOSPITAL | Age: 71
LOS: 5 days | Discharge: HOME | End: 2021-06-07
Attending: HOSPITALIST | Admitting: HOSPITALIST
Payer: MEDICARE

## 2021-06-01 VITALS
HEIGHT: 67 IN | DIASTOLIC BLOOD PRESSURE: 50 MMHG | HEART RATE: 78 BPM | SYSTOLIC BLOOD PRESSURE: 139 MMHG | RESPIRATION RATE: 18 BRPM | OXYGEN SATURATION: 100 % | TEMPERATURE: 98 F

## 2021-06-01 DIAGNOSIS — Z87.09 PERSONAL HISTORY OF OTHER DISEASES OF THE RESPIRATORY SYSTEM: Chronic | ICD-10-CM

## 2021-06-01 DIAGNOSIS — H05.352 EXOSTOSIS OF LEFT ORBIT: Chronic | ICD-10-CM

## 2021-06-01 DIAGNOSIS — R00.2 PALPITATIONS: ICD-10-CM

## 2021-06-01 DIAGNOSIS — Z98.89 OTHER SPECIFIED POSTPROCEDURAL STATES: Chronic | ICD-10-CM

## 2021-06-01 DIAGNOSIS — K62.5 HEMORRHAGE OF ANUS AND RECTUM: Chronic | ICD-10-CM

## 2021-06-01 DIAGNOSIS — Z98.890 OTHER SPECIFIED POSTPROCEDURAL STATES: Chronic | ICD-10-CM

## 2021-06-01 DIAGNOSIS — Z96.653 PRESENCE OF ARTIFICIAL KNEE JOINT, BILATERAL: Chronic | ICD-10-CM

## 2021-06-01 LAB
ALBUMIN SERPL ELPH-MCNC: 3.6 G/DL — SIGNIFICANT CHANGE UP (ref 3.3–5)
ALP SERPL-CCNC: 83 U/L — SIGNIFICANT CHANGE UP (ref 40–120)
ALT FLD-CCNC: 19 U/L — SIGNIFICANT CHANGE UP (ref 4–33)
ANION GAP SERPL CALC-SCNC: 10 MMOL/L — SIGNIFICANT CHANGE UP (ref 7–14)
AST SERPL-CCNC: 18 U/L — SIGNIFICANT CHANGE UP (ref 4–32)
BASOPHILS # BLD AUTO: 0.02 K/UL — SIGNIFICANT CHANGE UP (ref 0–0.2)
BASOPHILS NFR BLD AUTO: 0.3 % — SIGNIFICANT CHANGE UP (ref 0–2)
BILIRUB SERPL-MCNC: <0.2 MG/DL — SIGNIFICANT CHANGE UP (ref 0.2–1.2)
BUN SERPL-MCNC: 13 MG/DL — SIGNIFICANT CHANGE UP (ref 7–23)
CALCIUM SERPL-MCNC: 8.7 MG/DL — SIGNIFICANT CHANGE UP (ref 8.4–10.5)
CHLORIDE SERPL-SCNC: 106 MMOL/L — SIGNIFICANT CHANGE UP (ref 98–107)
CO2 SERPL-SCNC: 26 MMOL/L — SIGNIFICANT CHANGE UP (ref 22–31)
CREAT SERPL-MCNC: 0.68 MG/DL — SIGNIFICANT CHANGE UP (ref 0.5–1.3)
EOSINOPHIL # BLD AUTO: 0.05 K/UL — SIGNIFICANT CHANGE UP (ref 0–0.5)
EOSINOPHIL NFR BLD AUTO: 0.6 % — SIGNIFICANT CHANGE UP (ref 0–6)
GLUCOSE SERPL-MCNC: 106 MG/DL — HIGH (ref 70–99)
HCT VFR BLD CALC: 38.3 % — SIGNIFICANT CHANGE UP (ref 34.5–45)
HGB BLD-MCNC: 11.7 G/DL — SIGNIFICANT CHANGE UP (ref 11.5–15.5)
IANC: 5.79 K/UL — SIGNIFICANT CHANGE UP (ref 1.5–8.5)
IMM GRANULOCYTES NFR BLD AUTO: 0.3 % — SIGNIFICANT CHANGE UP (ref 0–1.5)
LYMPHOCYTES # BLD AUTO: 1.37 K/UL — SIGNIFICANT CHANGE UP (ref 1–3.3)
LYMPHOCYTES # BLD AUTO: 17.3 % — SIGNIFICANT CHANGE UP (ref 13–44)
MCHC RBC-ENTMCNC: 22.9 PG — LOW (ref 27–34)
MCHC RBC-ENTMCNC: 30.5 GM/DL — LOW (ref 32–36)
MCV RBC AUTO: 74.8 FL — LOW (ref 80–100)
MONOCYTES # BLD AUTO: 0.66 K/UL — SIGNIFICANT CHANGE UP (ref 0–0.9)
MONOCYTES NFR BLD AUTO: 8.3 % — SIGNIFICANT CHANGE UP (ref 2–14)
NEUTROPHILS # BLD AUTO: 5.79 K/UL — SIGNIFICANT CHANGE UP (ref 1.8–7.4)
NEUTROPHILS NFR BLD AUTO: 73.2 % — SIGNIFICANT CHANGE UP (ref 43–77)
NRBC # BLD: 0 /100 WBCS — SIGNIFICANT CHANGE UP
NRBC # FLD: 0 K/UL — SIGNIFICANT CHANGE UP
PLATELET # BLD AUTO: 287 K/UL — SIGNIFICANT CHANGE UP (ref 150–400)
POTASSIUM SERPL-MCNC: 3.8 MMOL/L — SIGNIFICANT CHANGE UP (ref 3.5–5.3)
POTASSIUM SERPL-SCNC: 3.8 MMOL/L — SIGNIFICANT CHANGE UP (ref 3.5–5.3)
PROT SERPL-MCNC: 6.4 G/DL — SIGNIFICANT CHANGE UP (ref 6–8.3)
RBC # BLD: 5.12 M/UL — SIGNIFICANT CHANGE UP (ref 3.8–5.2)
RBC # FLD: 16.7 % — HIGH (ref 10.3–14.5)
SARS-COV-2 RNA SPEC QL NAA+PROBE: SIGNIFICANT CHANGE UP
SODIUM SERPL-SCNC: 142 MMOL/L — SIGNIFICANT CHANGE UP (ref 135–145)
TROPONIN T, HIGH SENSITIVITY RESULT: 17 NG/L — SIGNIFICANT CHANGE UP
TROPONIN T, HIGH SENSITIVITY RESULT: 17 NG/L — SIGNIFICANT CHANGE UP
WBC # BLD: 7.91 K/UL — SIGNIFICANT CHANGE UP (ref 3.8–10.5)
WBC # FLD AUTO: 7.91 K/UL — SIGNIFICANT CHANGE UP (ref 3.8–10.5)

## 2021-06-01 PROCEDURE — 93010 ELECTROCARDIOGRAM REPORT: CPT

## 2021-06-01 PROCEDURE — 99285 EMERGENCY DEPT VISIT HI MDM: CPT | Mod: 25,GC

## 2021-06-01 PROCEDURE — 71046 X-RAY EXAM CHEST 2 VIEWS: CPT | Mod: 26

## 2021-06-01 RX ORDER — DILTIAZEM HCL 120 MG
60 CAPSULE, EXT RELEASE 24 HR ORAL ONCE
Refills: 0 | Status: COMPLETED | OUTPATIENT
Start: 2021-06-01 | End: 2021-06-01

## 2021-06-01 RX ORDER — MEXILETINE HYDROCHLORIDE 150 MG/1
200 CAPSULE ORAL ONCE
Refills: 0 | Status: COMPLETED | OUTPATIENT
Start: 2021-06-01 | End: 2021-06-01

## 2021-06-01 RX ADMIN — Medication 60 MILLIGRAM(S): at 22:12

## 2021-06-01 RX ADMIN — MEXILETINE HYDROCHLORIDE 200 MILLIGRAM(S): 150 CAPSULE ORAL at 22:40

## 2021-06-01 NOTE — ED PROVIDER NOTE - PROGRESS NOTE DETAILS
PA Smartt: on reassessment patient reports still feeling palpitations. no acute tele events recorded. trop 17/17. patient admitted to medicine for tele monitoring.

## 2021-06-01 NOTE — ED PROVIDER NOTE - OBJECTIVE STATEMENT
71 y/o F w/ hx tracheomalacia, asthma, TIA, adrenal insufficiency, DM2, COPD, PAF on eliquis presents with transient episode of palpitations and SOB this AM while sitting at home. Patient states this did not feel like COPD or respiratory issues, and that something is wrong with her heart, requesting admission for further testing. denies n, v syncope, dizziness, pain down arm, headache, visual changes, denies facial droop or changes in strength/sensation in arms or legs.     Cards: Devin  PCP: Nicole 71 y/o F w/ hx tracheomalacia, asthma, TIA, adrenal insufficiency, DM2, COPD, PAF on eliquis presents with transient episode of palpitations and SOB this AM while sitting at home. Patient states this did not feel like COPD or respiratory issues, and that something is wrong with her heart, requesting admission for further testing. denies n, v syncope, dizziness, pain down arm, headache, visual changes, denies facial droop or changes in strength/sensation in arms or legs.     Cards: Devin  PCP: Nicole    Attendinyo female presents with palpitations earlier today while sitting at home.  had shortness of breath associated with the symptoms.  symptoms are now resolved.

## 2021-06-01 NOTE — ED ADULT NURSE NOTE - PSH
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  H/O pelvic surgery  5 years ago - s/p fracture  H/O total knee replacement, bilateral  5 years ago  History of partial hysterectomy  30 years ago - fibroids  History of sinus surgery  multiple sinus surgeries  History of tracheomalacia  2015 - attempted tracheal stenting ([facility])- course complicated by obstruction, respiratory failure, multiple CPR attempts -  stent discontinued; 10/20/2016 Tracheobronchoplasty (Prolene Mesh) performed at [facility] by Dr Zapien  Rectal bleeding  exam under anesthesia ([facility]) 2/2018  S/P bronchoscopy  6/5/2018 - [facility] (Dr Zapien) no evidence of tracheobronchomalacia in trachea or bronchial tubes

## 2021-06-01 NOTE — ED PROVIDER NOTE - CLINICAL SUMMARY MEDICAL DECISION MAKING FREE TEXT BOX
69 y/o F w/ hx copd tracheomalacia, asthma, tia, dm2 presents with transient sob/palpitations this AM which resolved. PEx benign, ekg wnl. ddx low suspicion for acs, perhaps transient a-fib, check electrolytes, will discuss outpatient f/u, dispo pending as patient very much desires admission/CDU.

## 2021-06-01 NOTE — ED ADULT NURSE NOTE - INTERVENTIONS DEFINITIONS
Belen to call system/Call bell, personal items and telephone within reach/Physically safe environment: no spills, clutter or unnecessary equipment/Stretcher in lowest position, wheels locked, appropriate side rails in place/Monitor gait and stability

## 2021-06-01 NOTE — ED PROVIDER NOTE - PSH
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  H/O pelvic surgery  5 years ago - s/p fracture  H/O total knee replacement, bilateral  5 years ago  History of partial hysterectomy  30 years ago - fibroids  History of sinus surgery  multiple sinus surgeries  History of tracheomalacia  2015 - attempted tracheal stenting ([facility])- course complicated by obstruction, respiratory failure, multiple CPR attempts -  stent discontinued; 10/20/2016 Tracheobronchoplasty (Prolene Mesh) performed at [facility] by Dr Zapien  Rectal bleeding  exam under anesthesia ([facility]) 2/2018  S/P bronchoscopy  6/5/2018 - [facility] (Dr Zapien) no evidence of tracheobronchomalacia in trachea or bronchial tubes

## 2021-06-02 DIAGNOSIS — J39.8 OTHER SPECIFIED DISEASES OF UPPER RESPIRATORY TRACT: ICD-10-CM

## 2021-06-02 DIAGNOSIS — E11.9 TYPE 2 DIABETES MELLITUS WITHOUT COMPLICATIONS: ICD-10-CM

## 2021-06-02 DIAGNOSIS — J45.50 SEVERE PERSISTENT ASTHMA, UNCOMPLICATED: ICD-10-CM

## 2021-06-02 DIAGNOSIS — I48.0 PAROXYSMAL ATRIAL FIBRILLATION: ICD-10-CM

## 2021-06-02 DIAGNOSIS — R00.2 PALPITATIONS: ICD-10-CM

## 2021-06-02 DIAGNOSIS — E27.40 UNSPECIFIED ADRENOCORTICAL INSUFFICIENCY: ICD-10-CM

## 2021-06-02 DIAGNOSIS — Z29.9 ENCOUNTER FOR PROPHYLACTIC MEASURES, UNSPECIFIED: ICD-10-CM

## 2021-06-02 LAB
ALBUMIN SERPL ELPH-MCNC: 3.6 G/DL — SIGNIFICANT CHANGE UP (ref 3.3–5)
ALP SERPL-CCNC: 86 U/L — SIGNIFICANT CHANGE UP (ref 40–120)
ALT FLD-CCNC: 19 U/L — SIGNIFICANT CHANGE UP (ref 4–33)
ANION GAP SERPL CALC-SCNC: 11 MMOL/L — SIGNIFICANT CHANGE UP (ref 7–14)
AST SERPL-CCNC: 20 U/L — SIGNIFICANT CHANGE UP (ref 4–32)
BASOPHILS # BLD AUTO: 0.03 K/UL — SIGNIFICANT CHANGE UP (ref 0–0.2)
BASOPHILS NFR BLD AUTO: 0.5 % — SIGNIFICANT CHANGE UP (ref 0–2)
BILIRUB SERPL-MCNC: 0.2 MG/DL — SIGNIFICANT CHANGE UP (ref 0.2–1.2)
BUN SERPL-MCNC: 12 MG/DL — SIGNIFICANT CHANGE UP (ref 7–23)
CALCIUM SERPL-MCNC: 9.1 MG/DL — SIGNIFICANT CHANGE UP (ref 8.4–10.5)
CHLORIDE SERPL-SCNC: 105 MMOL/L — SIGNIFICANT CHANGE UP (ref 98–107)
CO2 SERPL-SCNC: 25 MMOL/L — SIGNIFICANT CHANGE UP (ref 22–31)
CREAT SERPL-MCNC: 0.7 MG/DL — SIGNIFICANT CHANGE UP (ref 0.5–1.3)
EOSINOPHIL # BLD AUTO: 0.19 K/UL — SIGNIFICANT CHANGE UP (ref 0–0.5)
EOSINOPHIL NFR BLD AUTO: 2.9 % — SIGNIFICANT CHANGE UP (ref 0–6)
GLUCOSE BLDC GLUCOMTR-MCNC: 140 MG/DL — HIGH (ref 70–99)
GLUCOSE BLDC GLUCOMTR-MCNC: 152 MG/DL — HIGH (ref 70–99)
GLUCOSE BLDC GLUCOMTR-MCNC: 217 MG/DL — HIGH (ref 70–99)
GLUCOSE BLDC GLUCOMTR-MCNC: 244 MG/DL — HIGH (ref 70–99)
GLUCOSE SERPL-MCNC: 110 MG/DL — HIGH (ref 70–99)
HCT VFR BLD CALC: 39 % — SIGNIFICANT CHANGE UP (ref 34.5–45)
HGB BLD-MCNC: 12.1 G/DL — SIGNIFICANT CHANGE UP (ref 11.5–15.5)
IANC: 3.64 K/UL — SIGNIFICANT CHANGE UP (ref 1.5–8.5)
IMM GRANULOCYTES NFR BLD AUTO: 0.3 % — SIGNIFICANT CHANGE UP (ref 0–1.5)
LYMPHOCYTES # BLD AUTO: 1.89 K/UL — SIGNIFICANT CHANGE UP (ref 1–3.3)
LYMPHOCYTES # BLD AUTO: 28.6 % — SIGNIFICANT CHANGE UP (ref 13–44)
MAGNESIUM SERPL-MCNC: 2 MG/DL — SIGNIFICANT CHANGE UP (ref 1.6–2.6)
MCHC RBC-ENTMCNC: 23.2 PG — LOW (ref 27–34)
MCHC RBC-ENTMCNC: 31 GM/DL — LOW (ref 32–36)
MCV RBC AUTO: 74.9 FL — LOW (ref 80–100)
MONOCYTES # BLD AUTO: 0.84 K/UL — SIGNIFICANT CHANGE UP (ref 0–0.9)
MONOCYTES NFR BLD AUTO: 12.7 % — SIGNIFICANT CHANGE UP (ref 2–14)
NEUTROPHILS # BLD AUTO: 3.64 K/UL — SIGNIFICANT CHANGE UP (ref 1.8–7.4)
NEUTROPHILS NFR BLD AUTO: 55 % — SIGNIFICANT CHANGE UP (ref 43–77)
NRBC # BLD: 0 /100 WBCS — SIGNIFICANT CHANGE UP
NRBC # FLD: 0 K/UL — SIGNIFICANT CHANGE UP
PHOSPHATE SERPL-MCNC: 3.5 MG/DL — SIGNIFICANT CHANGE UP (ref 2.5–4.5)
PLATELET # BLD AUTO: 284 K/UL — SIGNIFICANT CHANGE UP (ref 150–400)
POTASSIUM SERPL-MCNC: 4 MMOL/L — SIGNIFICANT CHANGE UP (ref 3.5–5.3)
POTASSIUM SERPL-SCNC: 4 MMOL/L — SIGNIFICANT CHANGE UP (ref 3.5–5.3)
PROT SERPL-MCNC: 6.2 G/DL — SIGNIFICANT CHANGE UP (ref 6–8.3)
RBC # BLD: 5.21 M/UL — HIGH (ref 3.8–5.2)
RBC # FLD: 16.8 % — HIGH (ref 10.3–14.5)
SODIUM SERPL-SCNC: 141 MMOL/L — SIGNIFICANT CHANGE UP (ref 135–145)
TROPONIN T, HIGH SENSITIVITY RESULT: 21 NG/L — SIGNIFICANT CHANGE UP
WBC # BLD: 6.61 K/UL — SIGNIFICANT CHANGE UP (ref 3.8–10.5)
WBC # FLD AUTO: 6.61 K/UL — SIGNIFICANT CHANGE UP (ref 3.8–10.5)

## 2021-06-02 PROCEDURE — 99223 1ST HOSP IP/OBS HIGH 75: CPT

## 2021-06-02 RX ORDER — POLYETHYLENE GLYCOL 3350 17 G/17G
17 POWDER, FOR SOLUTION ORAL DAILY
Refills: 0 | Status: DISCONTINUED | OUTPATIENT
Start: 2021-06-02 | End: 2021-06-03

## 2021-06-02 RX ORDER — CHLORHEXIDINE GLUCONATE 213 G/1000ML
1 SOLUTION TOPICAL DAILY
Refills: 0 | Status: DISCONTINUED | OUTPATIENT
Start: 2021-06-02 | End: 2021-06-07

## 2021-06-02 RX ORDER — DEXTROSE 50 % IN WATER 50 %
15 SYRINGE (ML) INTRAVENOUS ONCE
Refills: 0 | Status: DISCONTINUED | OUTPATIENT
Start: 2021-06-02 | End: 2021-06-07

## 2021-06-02 RX ORDER — DEXTROSE 50 % IN WATER 50 %
12.5 SYRINGE (ML) INTRAVENOUS ONCE
Refills: 0 | Status: DISCONTINUED | OUTPATIENT
Start: 2021-06-02 | End: 2021-06-07

## 2021-06-02 RX ORDER — INSULIN GLARGINE 100 [IU]/ML
12 INJECTION, SOLUTION SUBCUTANEOUS AT BEDTIME
Refills: 0 | Status: DISCONTINUED | OUTPATIENT
Start: 2021-06-02 | End: 2021-06-07

## 2021-06-02 RX ORDER — DEXTROSE 50 % IN WATER 50 %
25 SYRINGE (ML) INTRAVENOUS ONCE
Refills: 0 | Status: DISCONTINUED | OUTPATIENT
Start: 2021-06-02 | End: 2021-06-07

## 2021-06-02 RX ORDER — PANTOPRAZOLE SODIUM 20 MG/1
40 TABLET, DELAYED RELEASE ORAL
Refills: 0 | Status: DISCONTINUED | OUTPATIENT
Start: 2021-06-02 | End: 2021-06-07

## 2021-06-02 RX ORDER — ONDANSETRON 8 MG/1
4 TABLET, FILM COATED ORAL THREE TIMES A DAY
Refills: 0 | Status: DISCONTINUED | OUTPATIENT
Start: 2021-06-02 | End: 2021-06-07

## 2021-06-02 RX ORDER — GLUCAGON INJECTION, SOLUTION 0.5 MG/.1ML
1 INJECTION, SOLUTION SUBCUTANEOUS ONCE
Refills: 0 | Status: DISCONTINUED | OUTPATIENT
Start: 2021-06-02 | End: 2021-06-07

## 2021-06-02 RX ORDER — SODIUM CHLORIDE 9 MG/ML
1000 INJECTION, SOLUTION INTRAVENOUS
Refills: 0 | Status: DISCONTINUED | OUTPATIENT
Start: 2021-06-02 | End: 2021-06-07

## 2021-06-02 RX ORDER — BUDESONIDE AND FORMOTEROL FUMARATE DIHYDRATE 160; 4.5 UG/1; UG/1
2 AEROSOL RESPIRATORY (INHALATION)
Refills: 0 | Status: DISCONTINUED | OUTPATIENT
Start: 2021-06-02 | End: 2021-06-07

## 2021-06-02 RX ORDER — TIOTROPIUM BROMIDE 18 UG/1
1 CAPSULE ORAL; RESPIRATORY (INHALATION) DAILY
Refills: 0 | Status: DISCONTINUED | OUTPATIENT
Start: 2021-06-02 | End: 2021-06-07

## 2021-06-02 RX ORDER — MEXILETINE HYDROCHLORIDE 150 MG/1
200 CAPSULE ORAL THREE TIMES A DAY
Refills: 0 | Status: DISCONTINUED | OUTPATIENT
Start: 2021-06-02 | End: 2021-06-07

## 2021-06-02 RX ORDER — APIXABAN 2.5 MG/1
5 TABLET, FILM COATED ORAL EVERY 12 HOURS
Refills: 0 | Status: DISCONTINUED | OUTPATIENT
Start: 2021-06-02 | End: 2021-06-07

## 2021-06-02 RX ORDER — IPRATROPIUM/ALBUTEROL SULFATE 18-103MCG
3 AEROSOL WITH ADAPTER (GRAM) INHALATION EVERY 6 HOURS
Refills: 0 | Status: DISCONTINUED | OUTPATIENT
Start: 2021-06-02 | End: 2021-06-07

## 2021-06-02 RX ORDER — ATORVASTATIN CALCIUM 80 MG/1
20 TABLET, FILM COATED ORAL AT BEDTIME
Refills: 0 | Status: DISCONTINUED | OUTPATIENT
Start: 2021-06-02 | End: 2021-06-07

## 2021-06-02 RX ORDER — ROSUVASTATIN CALCIUM 5 MG/1
1 TABLET ORAL
Qty: 0 | Refills: 0 | DISCHARGE

## 2021-06-02 RX ORDER — INSULIN LISPRO 100/ML
VIAL (ML) SUBCUTANEOUS AT BEDTIME
Refills: 0 | Status: DISCONTINUED | OUTPATIENT
Start: 2021-06-02 | End: 2021-06-07

## 2021-06-02 RX ORDER — DILTIAZEM HCL 120 MG
60 CAPSULE, EXT RELEASE 24 HR ORAL EVERY 12 HOURS
Refills: 0 | Status: DISCONTINUED | OUTPATIENT
Start: 2021-06-02 | End: 2021-06-07

## 2021-06-02 RX ORDER — MONTELUKAST 4 MG/1
10 TABLET, CHEWABLE ORAL DAILY
Refills: 0 | Status: DISCONTINUED | OUTPATIENT
Start: 2021-06-02 | End: 2021-06-07

## 2021-06-02 RX ORDER — INSULIN LISPRO 100/ML
VIAL (ML) SUBCUTANEOUS
Refills: 0 | Status: DISCONTINUED | OUTPATIENT
Start: 2021-06-02 | End: 2021-06-07

## 2021-06-02 RX ORDER — ROFLUMILAST 500 UG/1
250 TABLET ORAL DAILY
Refills: 0 | Status: DISCONTINUED | OUTPATIENT
Start: 2021-06-02 | End: 2021-06-07

## 2021-06-02 RX ORDER — SENNA PLUS 8.6 MG/1
2 TABLET ORAL AT BEDTIME
Refills: 0 | Status: DISCONTINUED | OUTPATIENT
Start: 2021-06-02 | End: 2021-06-07

## 2021-06-02 RX ORDER — ACETAMINOPHEN 500 MG
650 TABLET ORAL ONCE
Refills: 0 | Status: COMPLETED | OUTPATIENT
Start: 2021-06-02 | End: 2021-06-02

## 2021-06-02 RX ORDER — INSULIN GLARGINE 100 [IU]/ML
15 INJECTION, SOLUTION SUBCUTANEOUS
Qty: 0 | Refills: 0 | DISCHARGE

## 2021-06-02 RX ADMIN — POLYETHYLENE GLYCOL 3350 17 GRAM(S): 17 POWDER, FOR SOLUTION ORAL at 17:07

## 2021-06-02 RX ADMIN — INSULIN GLARGINE 12 UNIT(S): 100 INJECTION, SOLUTION SUBCUTANEOUS at 21:37

## 2021-06-02 RX ADMIN — BUDESONIDE AND FORMOTEROL FUMARATE DIHYDRATE 2 PUFF(S): 160; 4.5 AEROSOL RESPIRATORY (INHALATION) at 10:45

## 2021-06-02 RX ADMIN — BUDESONIDE AND FORMOTEROL FUMARATE DIHYDRATE 2 PUFF(S): 160; 4.5 AEROSOL RESPIRATORY (INHALATION) at 21:36

## 2021-06-02 RX ADMIN — Medication 1: at 09:08

## 2021-06-02 RX ADMIN — APIXABAN 5 MILLIGRAM(S): 2.5 TABLET, FILM COATED ORAL at 17:08

## 2021-06-02 RX ADMIN — PANTOPRAZOLE SODIUM 40 MILLIGRAM(S): 20 TABLET, DELAYED RELEASE ORAL at 05:30

## 2021-06-02 RX ADMIN — MEXILETINE HYDROCHLORIDE 200 MILLIGRAM(S): 150 CAPSULE ORAL at 13:42

## 2021-06-02 RX ADMIN — APIXABAN 5 MILLIGRAM(S): 2.5 TABLET, FILM COATED ORAL at 05:30

## 2021-06-02 RX ADMIN — Medication 650 MILLIGRAM(S): at 18:45

## 2021-06-02 RX ADMIN — Medication 2: at 13:06

## 2021-06-02 RX ADMIN — MEXILETINE HYDROCHLORIDE 200 MILLIGRAM(S): 150 CAPSULE ORAL at 05:29

## 2021-06-02 RX ADMIN — MEXILETINE HYDROCHLORIDE 200 MILLIGRAM(S): 150 CAPSULE ORAL at 21:36

## 2021-06-02 RX ADMIN — SENNA PLUS 2 TABLET(S): 8.6 TABLET ORAL at 21:36

## 2021-06-02 RX ADMIN — Medication 4 MILLIGRAM(S): at 05:29

## 2021-06-02 RX ADMIN — TIOTROPIUM BROMIDE 1 CAPSULE(S): 18 CAPSULE ORAL; RESPIRATORY (INHALATION) at 10:46

## 2021-06-02 RX ADMIN — CHLORHEXIDINE GLUCONATE 1 APPLICATION(S): 213 SOLUTION TOPICAL at 17:07

## 2021-06-02 RX ADMIN — ROFLUMILAST 250 MICROGRAM(S): 500 TABLET ORAL at 13:42

## 2021-06-02 RX ADMIN — Medication 650 MILLIGRAM(S): at 17:46

## 2021-06-02 RX ADMIN — MONTELUKAST 10 MILLIGRAM(S): 4 TABLET, CHEWABLE ORAL at 13:42

## 2021-06-02 RX ADMIN — Medication 60 MILLIGRAM(S): at 17:08

## 2021-06-02 RX ADMIN — Medication 2: at 17:46

## 2021-06-02 RX ADMIN — ATORVASTATIN CALCIUM 20 MILLIGRAM(S): 80 TABLET, FILM COATED ORAL at 21:36

## 2021-06-02 NOTE — H&P ADULT - HISTORY OF PRESENT ILLNESS
This is a 70F with history of Tracheobronchomalasia s/p Tracheobronchoplasty, Bronchiectasis, Severe Allergic Asthma, Adrenal Insuffiencey, DM2, HTN, Colorectal cancer s/p partial colectomy now with colostomy, PAF on Eliquis, and reecntly diagnosed Bigeminy who presents to the hospital with complaints of palpitations with associated SOB and lightheadedness on Tuesday morning. Patient was recently hospitalized at [facility] 4/20-4/29 for palpitations/SOB and was found to have PVCs/bigeminy. Was started on mexiletine with improvement in her symptoms and was discharged home. Said that she was doing well at home but this morning had an episode of palpitations with associated SOB and lightheadedness and therefore called her cardiologist. Was recommended to go to [facility] and went there and from there was sent to [facility] for an abnormal EKG. Currently said that she feels well, denies any further palpitations, no chest pain, no SOB, no LE edema, no lightheadedness/syncope. No other complaints.     On arrival to the hospital, her vitals were T 97.6, P 78, /50, RR 18, O2 sat 100% RA. Her lab work did not show any significant abnormalities and her trops were stable at 17 (slightly lower than prior levels). Her CXR showed clear lungs. She was given cardizem 60mg PO x1 and mexiletine 200mg PO x1. She was admitted to medicine on telemetry.

## 2021-06-02 NOTE — H&P ADULT - PROBLEM SELECTOR PLAN 2
- On dupixent, clarify dosing schedule in AM and dose as needed  - Has chronic cough/sputum production without change, no wheezing or SOB currently, lungs clear to auscultation, no suspicion for exacerbation at present

## 2021-06-02 NOTE — H&P ADULT - ASSESSMENT
This is a 70F with history as above who presents to the hospital with complaints of palpitations with associated lightheadedness and SOB.

## 2021-06-02 NOTE — H&P ADULT - PROBLEM SELECTOR PLAN 6
- c/w home lantus (decrease to 80% of home dose), c/w ISS, FS qAC, diabetic diet  - c/w statin therapy with therapeutic interchange

## 2021-06-02 NOTE — CONSULT NOTE ADULT - ASSESSMENT
This is a 70F with history as above who presents to the hospital with complaints of palpitations with associated lightheadedness and SOB.  This is a 70F with history as above who presents to the hospital with complaints of palpitations with associated lightheadedness and SOB.     Patient known to me from the office (2 recent office visits).    Patient is a 71 yo woman with Tracheobronchomalasia s/p Tracheobronchoplasty, Bronchiectasis, Severe Allergic Asthma, Adrenal Insuffiencey, DM2, HTN, Colorectal cancer s/p partial colectomy now with colostomy, PAF on Eliquis, and reecntly diagnosed Bigeminy who presents to the hospital with complaints of palpitations with associated SOB and lightheadedness on Tuesday morning. Patient was recently hospitalized at [facility] 4/20-4/29 for palpitations/SOB and was found to have PVCs/bigeminy. EP team started her on mexiletine with initial improvement of symptoms and she was discharged home.  Said that she was doing well at home but yesterday morning, noted having an episode of palpitations with associated SOB and lightheadedness.    She called our office with reports of her symptoms.  As symptoms were intolerable, she presented to an [facility] which subsequently advised that she go to ED because of an abnormal EKG.     In the ED, when evaluated by the Medicine attending, she reported feeling well, and denied having any further palpitations.  She also denied having any other associated cardiac complaints including chest pain, SOB, lightheadedness/syncope.     On arrival to the hospital, her vitals were T 97.6, P 78, /50, RR 18, O2 sat 100% RA. Her lab work did not show any significant abnormalities and her trops were stable at 17 (slightly lower than prior levels). Her CXR showed clear lungs. She was given cardizem 60mg PO x1 and mexiletine 200mg PO x1. She was admitted to medicine on telemetry.  (02 Jun 2021 02:15)    On my exam, patient appeared clinically and hemodynamically stable.  Appreciate EP team input with regards to medical regimen re: ventricular ectopy.  No further cardiac testing needed at this time.  No objection to discharge from our perspective with f/u in the office within 2 weeks.

## 2021-06-02 NOTE — H&P ADULT - NSICDXPASTSURGICALHX_GEN_ALL_CORE_FT
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    H/O pelvic surgery 5 years ago - s/p fracture    H/O total knee replacement, bilateral 5 years ago    History of partial hysterectomy 30 years ago - fibroids    History of sinus surgery multiple sinus surgeries    History of tracheomalacia 2015 - attempted tracheal stenting ([facility])- course complicated by obstruction, respiratory failure, multiple CPR attempts -  stent discontinued; 10/20/2016 Tracheobronchoplasty (Prolene Mesh) performed at [facility] by Dr Zapien    Rectal bleeding exam under anesthesia ([facility]) 2/2018    S/P bronchoscopy 6/5/2018 - [facility] (Dr Zapien) no evidence of tracheobronchomalacia in trachea or bronchial tubes

## 2021-06-02 NOTE — CONSULT NOTE ADULT - SUBJECTIVE AND OBJECTIVE BOX
Cardiology/Vascular Medicine Inpatient Consultation Note    HISTORY OF PRESENT ILLNESS:  Patient known to me from the office (2 recent office visits).    Patient is a 71 yo woman with Tracheobronchomalasia s/p Tracheobronchoplasty, Bronchiectasis, Severe Allergic Asthma, Adrenal Insuffiencey, DM2, HTN, Colorectal cancer s/p partial colectomy now with colostomy, PAF on Eliquis, and reecntly diagnosed Bigeminy who presents to the hospital with complaints of palpitations with associated SOB and lightheadedness on Tuesday morning. Patient was recently hospitalized at [facility] 4/20-4/29 for palpitations/SOB and was found to have PVCs/bigeminy. Was started on mexiletine with improvement in her symptoms and was discharged home. Said that she was doing well at home but this morning had an episode of palpitations with associated SOB and lightheadedness and therefore called her cardiologist. [place] [facility] [place] and from there was sent to [facility] for an abnormal EKG. Currently said that she feels well, denies any further palpitations, no chest pain, no SOB, no LE edema, no lightheadedness/syncope. No other complaints.     On arrival to the hospital, her vitals were T 97.6, P 78, /50, RR 18, O2 sat 100% RA. Her lab work did not show any significant abnormalities and her trops were stable at 17 (slightly lower than prior levels). Her CXR showed clear lungs. She was given cardizem 60mg PO x1 and mexiletine 200mg PO x1. She was admitted to medicine on telemetry.  (02 Jun 2021 02:15)          Allergies  ampicillin (Short breath)  aspirin (Short breath)  Avelox (Short breath; Pruritus)  codeine (Short breath)  Dilaudid (Short breath)  iodine (Short breath; Swelling)  penicillin (Short breath)  shellfish (Anaphylaxis)  tetanus toxoid (Short breath)  Valium (Short breath)      MEDICATIONS:  apixaban 5 milliGRAM(s) Oral every 12 hours  diltiazem    Tablet 60 milliGRAM(s) Oral every 12 hours  mexiletine 200 milliGRAM(s) Oral three times a day  albuterol/ipratropium for Nebulization 3 milliLiter(s) Nebulizer every 6 hours PRN  budesonide 160 MICROgram(s)/formoterol 4.5 MICROgram(s) Inhaler 2 Puff(s) Inhalation two times a day  montelukast 10 milliGRAM(s) Oral daily  roflumilast 250 MICROGram(s) Oral daily  tiotropium 18 MICROgram(s) Capsule 1 Capsule(s) Inhalation daily  ondansetron    Tablet 4 milliGRAM(s) Oral three times a day PRN  pantoprazole    Tablet 40 milliGRAM(s) Oral before breakfast  polyethylene glycol 3350 17 Gram(s) Oral daily PRN  senna 2 Tablet(s) Oral at bedtime  atorvastatin 20 milliGRAM(s) Oral at bedtime  dextrose 40% Gel 15 Gram(s) Oral once  dextrose 50% Injectable 25 Gram(s) IV Push once  dextrose 50% Injectable 12.5 Gram(s) IV Push once  dextrose 50% Injectable 25 Gram(s) IV Push once  glucagon  Injectable 1 milliGRAM(s) IntraMuscular once  insulin glargine Injectable (LANTUS) 12 Unit(s) SubCutaneous at bedtime  insulin lispro (ADMELOG) corrective regimen sliding scale   SubCutaneous three times a day before meals  insulin lispro (ADMELOG) corrective regimen sliding scale   SubCutaneous at bedtime  methylPREDNISolone 4 milliGRAM(s) Oral daily  chlorhexidine 2% Cloths 1 Application(s) Topical daily  dextrose 5%. 1000 milliLiter(s) IV Continuous <Continuous>  dextrose 5%. 1000 milliLiter(s) IV Continuous <Continuous>    PAST MEDICAL & SURGICAL HISTORY:  Atrial fibrillation  paroxysmal, on eliquis  Diabetes  Type 2  COPD (chronic obstructive pulmonary disease)  Adrenal insufficiency  Medrol daily for over 50 years  Aortic insufficiency  moderate AR on echo 5/3/2018  Pelvic fracture  Asthma  Tracheobronchomalacia  diagnosed 2015, s/p bronchial thermoplasty 2016 (Dr Zapien); recent bronchoscopy 6/5/2018 revealed no evidence of tracheobronchomalacia in trachea or bronchial tubes    Colorectal cancer  4/2018- last treatment , chemo and radiation    Rectal bleeding    Seizure  x 1 1/7/18    DVT (deep venous thrombosis)  15-20 years ago, took coumadin    TIA (transient ischemic attack)  multiple, last 5 years ago - presents as right-sided weakness    History of partial hysterectomy  30 years ago - fibroids    H/O total knee replacement, bilateral  5 years ago    History of sinus surgery  multiple sinus surgeries    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)- course complicated by obstruction, respiratory failure, multiple CPR attempts -  stent discontinued; 10/20/2016 Tracheobronchoplasty (Prolene Mesh) performed at [facility] by Dr Zapien    S/P bronchoscopy  6/5/2018 - [place] (Dr Zapien) no evidence of tracheobronchomalacia in trachea or bronchial tubes    Rectal bleeding  exam under anesthesia ([facility]) 2/2018        FAMILY HISTORY:  Family history of asthma    Family history of breast cancer (Sibling)    Family history of diabetes mellitus type II        SOCIAL HISTORY:    [ ] Non-smoker  [ ] Smoker  [ ] Alcohol    REVIEW OF SYSTEMS:  CONSTITUTIONAL: No fever, weight loss, or fatigue  EYES: No eye pain, visual disturbances, or discharge  ENMT:  No difficulty hearing, tinnitus, vertigo; No sinus or throat pain  NECK: No pain or stiffness  RESPIRATORY: No cough, wheezing, chills or hemoptysis; No Shortness of Breath  CARDIOVASCULAR: No chest pain, palpitations, passing out, dizziness, or leg swelling  GASTROINTESTINAL: No abdominal or epigastric pain. No nausea, vomiting, or hematemesis; No diarrhea or constipation. No melena or hematochezia.  GENITOURINARY: No dysuria, frequency, hematuria, or incontinence  NEUROLOGICAL: No headaches, memory loss, loss of strength, numbness, or tremors  SKIN: No itching, burning, rashes, or lesions   LYMPH Nodes: No enlarged glands  ENDOCRINE: No heat or cold intolerance; No hair loss  MUSCULOSKELETAL: No joint pain or swelling; No muscle, back, or extremity pain  PSYCHIATRIC: No depression, anxiety, mood swings, or difficulty sleeping  HEME/LYMPH: No easy bruising, or bleeding gums  ALLERY AND IMMUNOLOGIC: No hives or eczema	    [ ] All others negative	  [ ] Unable to obtain    PHYSICAL EXAM:  T(C): 36.6 (06-02-21 @ 05:23), Max: 36.9 (06-01-21 @ 16:25)  HR: 57 (06-02-21 @ 05:23) (57 - 78)  BP: 155/63 (06-02-21 @ 05:23) (139/50 - 176/50)  RR: 16 (06-02-21 @ 05:23) (16 - 22)  SpO2: 100% (06-02-21 @ 05:23) (99% - 100%)  Wt(kg): --  I&O's Summary      Appearance: Normal	  HEENT:   Normal oral mucosa, PERRL, EOMI	  Lymphatic: No lymphadenopathy  Cardiovascular: Normal S1 S2, No JVD, No murmurs, No edema  Respiratory: Lungs clear to auscultation	  Psychiatry: A & O x 3, Mood & affect appropriate  Gastrointestinal:  Soft, Non-tender, + BS	  Skin: No rashes, No ecchymoses, No cyanosis	  Neurologic: Non-focal  Extremities: Normal range of motion, No clubbing, cyanosis or edema  Vascular: Peripheral pulses palpable 2+ bilaterally      LABS:	 	    CBC Full  -  ( 01 Jun 2021 17:27 )  WBC Count : 7.91 K/uL  Hemoglobin : 11.7 g/dL  Hematocrit : 38.3 %  Platelet Count - Automated : 287 K/uL  Mean Cell Volume : 74.8 fL  Mean Cell Hemoglobin : 22.9 pg  Mean Cell Hemoglobin Concentration : 30.5 gm/dL  Auto Neutrophil # : 5.79 K/uL  Auto Lymphocyte # : 1.37 K/uL  Auto Monocyte # : 0.66 K/uL  Auto Eosinophil # : 0.05 K/uL  Auto Basophil # : 0.02 K/uL  Auto Neutrophil % : 73.2 %  Auto Lymphocyte % : 17.3 %  Auto Monocyte % : 8.3 %  Auto Eosinophil % : 0.6 %  Auto Basophil % : 0.3 %    06-01    142  |  106  |  13  ----------------------------<  106<H>  3.8   |  26  |  0.68    Ca    8.7      01 Jun 2021 17:27  Mg     1.7     06-01    TPro  6.4  /  Alb  3.6  /  TBili  <0.2  /  DBili  x   /  AST  18  /  ALT  19  /  AlkPhos  83  06-01      proBNP:   Lipid Profile:   HgA1c:   TSH:       CARDIAC MARKERS:            TELEMETRY: 	    ECG:   	  RADIOLOGY:  OTHER: 	      PREVIOUS DIAGNOSTIC CARDIOVASCULAR TESTING:      [ ]  Echocardiogram:  [ ]  Catheterization:  [ ]  Stress test:    [ ]  Vascular studies:  	  	     Cardiology/Vascular Medicine Inpatient Consultation Note    HISTORY OF PRESENT ILLNESS:  Patient known to me from the office (2 recent office visits).    Patient is a 71 yo woman with Tracheobronchomalasia s/p Tracheobronchoplasty, Bronchiectasis, Severe Allergic Asthma, Adrenal Insuffiencey, DM2, HTN, Colorectal cancer s/p partial colectomy now with colostomy, PAF on Eliquis, and reecntly diagnosed Bigeminy who presents to the hospital with complaints of palpitations with associated SOB and lightheadedness on Tuesday morning. Patient was recently hospitalized at [facility] 4/20-4/29 for palpitations/SOB and was found to have PVCs/bigeminy. EP team started her on mexiletine with initial improvement of symptoms and she was discharged home.  Said that she was doing well at home but yesterday morning, noted having an episode of palpitations with associated SOB and lightheadedness.    She called our office with reports of her symptoms.  As symptoms were intolerable, she presented to an [facility] [place] which subsequently advised that she go to ED because of an abnormal EKG.     In the ED, when evaluated by the Medicine attending, she reported feeling well, and denied having any further palpitations.  She also denied having any other associated cardiac complaints including chest pain, SOB, lightheadedness/syncope.     On arrival to the hospital, her vitals were T 97.6, P 78, /50, RR 18, O2 sat 100% RA. Her lab work did not show any significant abnormalities and her trops were stable at 17 (slightly lower than prior levels). Her CXR showed clear lungs. She was given cardizem 60mg PO x1 and mexiletine 200mg PO x1. She was admitted to medicine on telemetry.  (02 Jun 2021 02:15)    On my exam,      Allergies  ampicillin (Short breath)  aspirin (Short breath)  Avelox (Short breath; Pruritus)  codeine (Short breath)  Dilaudid (Short breath)  iodine (Short breath; Swelling)  penicillin (Short breath)  shellfish (Anaphylaxis)  tetanus toxoid (Short breath)  Valium (Short breath)      MEDICATIONS:  apixaban 5 milliGRAM(s) Oral every 12 hours  diltiazem    Tablet 60 milliGRAM(s) Oral every 12 hours  mexiletine 200 milliGRAM(s) Oral three times a day  albuterol/ipratropium for Nebulization 3 milliLiter(s) Nebulizer every 6 hours PRN  budesonide 160 MICROgram(s)/formoterol 4.5 MICROgram(s) Inhaler 2 Puff(s) Inhalation two times a day  montelukast 10 milliGRAM(s) Oral daily  roflumilast 250 MICROGram(s) Oral daily  tiotropium 18 MICROgram(s) Capsule 1 Capsule(s) Inhalation daily  ondansetron    Tablet 4 milliGRAM(s) Oral three times a day PRN  pantoprazole    Tablet 40 milliGRAM(s) Oral before breakfast  polyethylene glycol 3350 17 Gram(s) Oral daily PRN  senna 2 Tablet(s) Oral at bedtime  atorvastatin 20 milliGRAM(s) Oral at bedtime  dextrose 40% Gel 15 Gram(s) Oral once  dextrose 50% Injectable 25 Gram(s) IV Push once  dextrose 50% Injectable 12.5 Gram(s) IV Push once  dextrose 50% Injectable 25 Gram(s) IV Push once  glucagon  Injectable 1 milliGRAM(s) IntraMuscular once  insulin glargine Injectable (LANTUS) 12 Unit(s) SubCutaneous at bedtime  insulin lispro (ADMELOG) corrective regimen sliding scale   SubCutaneous three times a day before meals  insulin lispro (ADMELOG) corrective regimen sliding scale   SubCutaneous at bedtime  methylPREDNISolone 4 milliGRAM(s) Oral daily  chlorhexidine 2% Cloths 1 Application(s) Topical daily  dextrose 5%. 1000 milliLiter(s) IV Continuous <Continuous>  dextrose 5%. 1000 milliLiter(s) IV Continuous <Continuous>    PAST MEDICAL & SURGICAL HISTORY:  Atrial fibrillation  paroxysmal, on eliquis  Diabetes  Type 2  COPD (chronic obstructive pulmonary disease)  Adrenal insufficiency  Medrol daily for over 50 years  Aortic insufficiency  moderate AR on echo 5/3/2018  Pelvic fracture  Asthma  Tracheobronchomalacia  diagnosed 2015, s/p bronchial thermoplasty 2016 (Dr Zapien); recent bronchoscopy 6/5/2018 revealed no evidence of tracheobronchomalacia in trachea or bronchial tubes  Colorectal cancer  4/2018- last treatment , chemo and radiation  Rectal bleeding  Seizure  x 1 1/7/18  DVT (deep venous thrombosis)  15-20 years ago, took coumadin  TIA (transient ischemic attack)  multiple, last 5 years ago - presents as right-sided weakness  History of partial hysterectomy  30 years ago - fibroids  H/O total knee replacement, bilateral  5 years ago  History of sinus surgery  multiple sinus surgeries  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  H/O pelvic surgery  5 years ago - s/p fracture  History of tracheomalacia  2015 - attempted tracheal stenting ([facility] course complicated by obstruction, respiratory failure, multiple CPR attempts -  stent discontinued; 10/20/2016 Tracheobronchoplasty (Prolene Mesh) performed at [facility] by Dr Zapien  S/P bronchoscopy  6/5/2018 - [place] (Dr Zapien) no evidence of tracheobronchomalacia in trachea or bronchial tubes  Rectal bleeding  exam under anesthesia ([facility]) 2/2018    FAMILY HISTORY:  Family history of asthma  Family history of breast cancer (Sibling)  Family history of diabetes mellitus type II    SOCIAL HISTORY:    As above    REVIEW OF SYSTEMS:  As above, as per chart notes    PHYSICAL EXAM:  T(C): 36.6 (06-02-21 @ 05:23), Max: 36.9 (06-01-21 @ 16:25)  HR: 57 (06-02-21 @ 05:23) (57 - 78)  BP: 155/63 (06-02-21 @ 05:23) (139/50 - 176/50)  RR: 16 (06-02-21 @ 05:23) (16 - 22)  SpO2: 100% (06-02-21 @ 05:23) (99% - 100%)    Appearance: NAD  HEENT:   No JVD  Cardiovascular: Normal S1 S2  Respiratory: Lungs clear to auscultation	  Psychiatry: Awake, alert  Gastrointestinal:  Soft, Non-tender, + BS	  Neurologic: Non-focal  Extremities: No edema      LABS:	 	                          12.1   6.61  )-----------( 284      ( 02 Jun 2021 07:54 )             39.0     06-01    142  |  106  |  13  ----------------------------<  106<H>  3.8   |  26  |  0.68    Ca    8.7      01 Jun 2021 17:27  Mg     1.7     06-01    TPro  6.4  /  Alb  3.6  /  TBili  <0.2  /  DBili  x   /  AST  18  /  ALT  19  /  AlkPhos  83  06-01    < from: Transthoracic Echocardiogram (04.23.21 @ 09:26) >    Patient name: RAYRAY RODRIUGEZ  YOB: 1950   Age: 70 (F)   MR#: [id number]  Study Date: 4/23/2021  [facility]  Study quality: Technically good  Referring Physician: Ronn Narayanan MD  Blood Pressure: 148/52 mmHg  Height: 170 cm  Weight: 64 kg  BSA: 1.8 m2  ------------------------------------------------------------------------  PROCEDURE: Transthoracic echocardiogram with 2-D, M-Mode  and complete spectral and color flow Doppler.  INDICATION: Palpitations (R00.2)  ------------------------------------------------------------------------  DIMENSIONS:  Dimensions:     Normal Values:  LA:     4.1 cm    2.0 - 4.0 cm  Ao:     3.3 cm    2.0 - 3.8 cm  SEPTUM: 0.7 cm    0.6 - 1.2 cm  PWT:    0.7 cm0.6 - 1.1 cm  LVIDd:  4.0 cm    3.0 - 5.6 cm  LVIDs:  2.7 cm    1.8 - 4.0 cm  Derived Variables:  LVMI: 45 g/m2  RWT: 0.35  Fractional short: 33 %  Ejection Fraction (Teicholtz): 61 %  ------------------------------------------------------------------------  OBSERVATIONS:  Mitral Valve: Mitral annular calcification, otherwise  normal mitral valve. Minimal mitral regurgitation.  Aortic Root: Normal aortic root.  Aortic Valve: Calcified trileaflet aortic valve with normal  opening. Moderate aortic regurgitation.  Vena contracta  width about  0.4 cm.  Left Atrium: Mildly dilated left atrium.  LA volume index =  35 cc/m2.  Left Ventricle: Normal left ventricular systolic function.  No segmental wall motion abnormalities. Normal left  ventricular internal dimensions and wall thicknesses. Mild  diastolic dysfunction (Stage I).  Right Heart: Normal right atrium. Normal right ventricular  size and function. Normal tricuspid valve. Minimal  tricuspid regurgitation. Normal pulmonic valve. Minimal  pulmonic regurgitation.  Pericardium/PleuraNormal pericardium with no pericardial  effusion.  ------------------------------------------------------------------------  CONCLUSIONS:  1. Mitral annular calcification, otherwise normal mitral  valve. Minimal mitral regurgitation.  2. Calcified trileaflet aortic valve with normal opening.  Moderate aortic regurgitation.  Vena contracta width about  0.4 cm.  3. Mildly dilated left atrium.  LA volume index = 35 cc/m2.  4. Normal left ventricular internaldimensions and wall  thicknesses.  5. Normal left ventricular systolic function. No segmental  wall motion abnormalities.  6. Mild diastolic dysfunction (Stage I).  7. Normal right ventricular size and function.  ------------------------------------------------------------------------  Confirmed on  4/23/2021 - 10:42:27 by Sergio Arizmendi M.D.,  [facility], [place]  ------------------------------------------------------------------------    < end of copied text >        "Thank you for the opportunity to participate in the care of this patient."      KATALINA GUERRA MD; Attending Cardiologist  This document has been electronically signed. Jan 12 2021  2:13PM    < end of copied text >  < from: Xray Chest 2 Views PA/Lat (06.01.21 @ 18:41) >  EXAM:  XR CHEST PA LAT 2V        PROCEDURE DATE:  Jun 1 2021         INTERPRETATION:  CLINICAL INDICATION: Chest pain.    TECHNIQUE: PA and lateral views of the chest.    COMPARISON: Chest radiograph 4/20/2021    FINDINGS:    Right chest wall infusion port with catheter tip in the SVC.  Cardiac size is within normal limits.  The lungs are clear.  There are no pleural effusions. No pneumothorax.  Degenerative changes of the thoracic spine.    IMPRESSION:  Clear lungs.    CECILIA BELCHER MD; Resident Radiology  This document has been electronically signed.  SAHIL ADKINS MD; Attending Radiologist  This document has been electronically signed. Jun 2 2021  8:12AM    < end of copied text >  < from: MR Cardiac No Cont (01.04.21 @ 22:27) >    EXAM:  MR CARD MORPH FUNCTION                          *** ADDENDUM 02/09/2021  ***    RVEF 56%  RVEDV- 117ml  RVESV- 51ml  SV: 66ml    RVEDVi- 68 ml/m2  RVESVi- 30ml/m2  SVi- 38ml/m2     Correction to report findings:    Mild to Moderate Aortic regurgitation based  on regurgitation volume 25ml  and Regurgitation fraction of 38%.      *** END OF ADDENDUM 02/09/2021  ***      PROCEDURE DATE:  01/04/2021          INTERPRETATION:  I. Clinical Information: 71 yo F with Aortic Insufficiency    II. Technique: Comprehensive Cardiac MRI examination was performed on a 1.5 T scanner using standard cardiac coil, cardiac gating, and standard pulse sequences for the assessment of cardiac morphology, function, and viability.  .    Ht: 170cm  Wt: 62kg  BSA: 1.71    Contrast:  mL of Multihance / Gadavist    III. Comparison: No images available for comparison.    IV. Findings    A. Cardiac morphology:    1. Left ventricle  a. Global systolic function: Global hypokinesis  b. Cavity size: Normal  c. Wall thickness: Normal    2. Right ventricle  a. Global systolic function: Normal  b. Cavity size: Normal    3. Aortic valve  a. Leaflets:  Calcified  b. Aortic Regurgitation: Moderate to severe AI  c. Aortic stenosis: Absent    4. Mitral valve  a. Leaflets: Normal  b. Leaflet mobility: Normal  c. Mitral regurgitation: Absent  d. Mitral stenosis: Absent    5. Tricuspid valve  a. Leaflets: Normal  b. Leaflet mobility: Normal  c. Tricuspid regurgitation: Absent  d. Tricuspid stenosis: Absent    6.Pulmonic valve  a. Leaflets: Normal  b. PI: Absent  c. PS: Absent    7. Left atrium  a. Size: Normal  b. Left atrial volume: 18ml  c. Left atrial volume index (Biplane method): 10mL/m2    8. Right atrium  a. Size:  Normal  b. Right atrial area: 14cm2    9. Pericardium  a.Effusion: None    10. Aorta  No significant abnormalities in the visualized portions of the thoracic aorta    11. Pulmonary artery  No significant abnormalities in the visualized portions of the pulmonary artery    B. Ventricular volume measurements    LVEF: 72%  LVEDV: 110mL  LVESV:30mL  SV: 80mL    LVEDVi:  64mL/m2  LVESVi: 18 mL/2  LVSVi: 47 mL/m2        C.Myocardial wall motion by regions (using 17 segment model):    1.Base  a. Anterior:  Normal  b. Anteroseptal: Normal  c. Anterolateral: Normal  d. Inferior: Normal  e. Inferoseptal:  Normal  f. Inferolateral:  Normal    2.  Mid  a. Anterior:  Normal  b. Anteroseptal:  Normal  c. Anterolateral:  Normal  d. Inferior:  Normal  e. Inferoseptal:  Normal  f. Inferolateral:  Normal    3.Apical  a. Anterior: Normal  b. Septal:    Normal  c. Inferior:    Normal  d. Lateral:    Normal    4.True Dyke: Normal        E. Q-flow analysis (stenosis/regurgitation/shunt):    Qp:  Stroke volume:38 ml  Forward flow: 38ml  Backward flow: 0 ml  Regurgitant fraction: 0%    Qs:  Stroke volume: 41ml  Forward flow: 65ml  Backward flow: 25ml  Regurgitant fraction: 38%    Qp/Qs:0.92        INTERPRETATION:  1.  The left ventricle (LV) is normal in size. There is no evidence of LV hypertrophy. Left ventricular global systolic function is reduced The LV ejection fraction is  72 %.  2.  There is Moderate to Severe aortic insufficiency  3.  The right ventricle (RV) is normal in size. RV global systolic function is Normal  4.  No significant abnormalities of the visualized portions of the great vessels.      The sequences used in this study were designed for imaging cardiac structures and are suboptimal for imaging other structures and organs    ***Please see the addendum at the top of this report. It may contain additional important information or changes.****      Thank you for the opportunity to participate in the care of this patient.      MINISTERIO ROWLAND MD; Attending Cardiologist  This document has been electronically signed. Jan 52021  4:31PM  Addend:MINISTERIO ROWLAND MD; Attending Cardiologist  This addendum was electronically signed on: Feb 9 2021  6:39PM.    < end of copied text >  	  	     Cardiology/Vascular Medicine Inpatient Consultation Note    HISTORY OF PRESENT ILLNESS:  Patient known to me from the office (2 recent office visits).    Patient is a 69 yo woman with Tracheobronchomalasia s/p Tracheobronchoplasty, Bronchiectasis, Severe Allergic Asthma, Adrenal Insuffiencey, DM2, HTN, Colorectal cancer s/p partial colectomy now with colostomy, PAF on Eliquis, and reecntly diagnosed Bigeminy who presents to the hospital with complaints of palpitations with associated SOB and lightheadedness on Tuesday morning. Patient was recently hospitalized at [facility] 4/20-4/29 for palpitations/SOB and was found to have PVCs/bigeminy. EP team started her on mexiletine with initial improvement of symptoms and she was discharged home.  Said that she was doing well at home but yesterday morning, noted having an episode of palpitations with associated SOB and lightheadedness.    She called our office with reports of her symptoms.  As symptoms were intolerable, she presented to an [facility] which [place] advised that she go to ED because of an abnormal EKG.     In the ED, when evaluated by the Medicine attending, she reported feeling well, and denied having any further palpitations.  She also denied having any other associated cardiac complaints including chest pain, SOB, lightheadedness/syncope.     On arrival to the hospital, her vitals were T 97.6, P 78, /50, RR 18, O2 sat 100% RA. Her lab work did not show any significant abnormalities and her trops were stable at 17 (slightly lower than prior levels). Her CXR showed clear lungs. She was given cardizem 60mg PO x1 and mexiletine 200mg PO x1. She was admitted to medicine on telemetry.  (02 Jun 2021 02:15)    On my exam, patient appeared clinically and hemodynamically stable.  Appreciate EP team input with regards to medical regimen re: ventricular ectopy.  No further cardiac testing needed at this time.  No objection to discharge from our perspective with f/u in the office within 2 weeks.      Allergies  ampicillin (Short breath)  aspirin (Short breath)  Avelox (Short breath; Pruritus)  codeine (Short breath)  Dilaudid (Short breath)  iodine (Short breath; Swelling)  penicillin (Short breath)  shellfish (Anaphylaxis)  tetanus toxoid (Short breath)  Valium (Short breath)      MEDICATIONS:  apixaban 5 milliGRAM(s) Oral every 12 hours  diltiazem    Tablet 60 milliGRAM(s) Oral every 12 hours  mexiletine 200 milliGRAM(s) Oral three times a day  albuterol/ipratropium for Nebulization 3 milliLiter(s) Nebulizer every 6 hours PRN  budesonide 160 MICROgram(s)/formoterol 4.5 MICROgram(s) Inhaler 2 Puff(s) Inhalation two times a day  montelukast 10 milliGRAM(s) Oral daily  roflumilast 250 MICROGram(s) Oral daily  tiotropium 18 MICROgram(s) Capsule 1 Capsule(s) Inhalation daily  ondansetron    Tablet 4 milliGRAM(s) Oral three times a day PRN  pantoprazole    Tablet 40 milliGRAM(s) Oral before breakfast  polyethylene glycol 3350 17 Gram(s) Oral daily PRN  senna 2 Tablet(s) Oral at bedtime  atorvastatin 20 milliGRAM(s) Oral at bedtime  dextrose 40% Gel 15 Gram(s) Oral once  dextrose 50% Injectable 25 Gram(s) IV Push once  dextrose 50% Injectable 12.5 Gram(s) IV Push once  dextrose 50% Injectable 25 Gram(s) IV Push once  glucagon  Injectable 1 milliGRAM(s) IntraMuscular once  insulin glargine Injectable (LANTUS) 12 Unit(s) SubCutaneous at bedtime  insulin lispro (ADMELOG) corrective regimen sliding scale   SubCutaneous three times a day before meals  insulin lispro (ADMELOG) corrective regimen sliding scale   SubCutaneous at bedtime  methylPREDNISolone 4 milliGRAM(s) Oral daily  chlorhexidine 2% Cloths 1 Application(s) Topical daily  dextrose 5%. 1000 milliLiter(s) IV Continuous <Continuous>  dextrose 5%. 1000 milliLiter(s) IV Continuous <Continuous>    PAST MEDICAL & SURGICAL HISTORY:  Atrial fibrillation  paroxysmal, on eliquis  Diabetes  Type 2  COPD (chronic obstructive pulmonary disease)  Adrenal insufficiency  Medrol daily for over 50 years  Aortic insufficiency  moderate AR on echo 5/3/2018  Pelvic fracture  Asthma  Tracheobronchomalacia  diagnosed 2015, s/p bronchial thermoplasty 2016 (Dr Zapien); recent bronchoscopy 6/5/2018 revealed no evidence of tracheobronchomalacia in trachea or bronchial tubes  Colorectal cancer  4/2018- last treatment , chemo and radiation  Rectal bleeding  Seizure  x 1 1/7/18  DVT (deep venous thrombosis)  15-20 years ago, took coumadin  TIA (transient ischemic attack)  multiple, last 5 years ago - presents as right-sided weakness  History of partial hysterectomy  30 years ago - fibroids  H/O total knee replacement, bilateral  5 years ago  History of sinus surgery  multiple sinus surgeries  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  H/O pelvic surgery  5 years ago - s/p fracture  History of tracheomalacia  2015 - attempted tracheal stenting ([facility] course complicated by obstruction, respiratory failure, multiple CPR attempts -  stent discontinued; 10/20/2016 Tracheobronchoplasty (Prolene Mesh) performed at [facility] by Dr Zapien  S/P bronchoscopy  6/5/2018 - [place] (Dr Zapien) no evidence of tracheobronchomalacia in trachea or bronchial tubes  Rectal bleeding  exam under anesthesia ([facility]) 2/2018    FAMILY HISTORY:  Family history of asthma  Family history of breast cancer (Sibling)  Family history of diabetes mellitus type II    SOCIAL HISTORY:    As above    REVIEW OF SYSTEMS:  As above, as per chart notes    PHYSICAL EXAM:  T(C): 36.6 (06-02-21 @ 05:23), Max: 36.9 (06-01-21 @ 16:25)  HR: 57 (06-02-21 @ 05:23) (57 - 78)  BP: 155/63 (06-02-21 @ 05:23) (139/50 - 176/50)  RR: 16 (06-02-21 @ 05:23) (16 - 22)  SpO2: 100% (06-02-21 @ 05:23) (99% - 100%)    Appearance: NAD  HEENT:   No JVD  Cardiovascular: Normal S1 S2  Respiratory: Lungs clear to auscultation	  Psychiatry: Awake, alert  Gastrointestinal:  Soft, Non-tender, + BS	  Neurologic: Non-focal  Extremities: No edema      LABS:	 	                          12.1   6.61  )-----------( 284      ( 02 Jun 2021 07:54 )             39.0     06-01    142  |  106  |  13  ----------------------------<  106<H>  3.8   |  26  |  0.68    Ca    8.7      01 Jun 2021 17:27  Mg     1.7     06-01    TPro  6.4  /  Alb  3.6  /  TBili  <0.2  /  DBili  x   /  AST  18  /  ALT  19  /  AlkPhos  83  06-01    < from: Transthoracic Echocardiogram (04.23.21 @ 09:26) >    Patient name: RAYRAY RODRIGUEZ  YOB: 1950   Age: 70 (F)   MR#: [id number]  Study Date: 4/23/2021  [facility]  Study quality: Technically good  Referring Physician: Ronn Narayanan MD  Blood Pressure: 148/52 mmHg  Height: 170 cm  Weight: 64 kg  BSA: 1.8 m2  ------------------------------------------------------------------------  PROCEDURE: Transthoracic echocardiogram with 2-D, M-Mode  and complete spectral and color flow Doppler.  INDICATION: Palpitations (R00.2)  ------------------------------------------------------------------------  DIMENSIONS:  Dimensions:     Normal Values:  LA:     4.1 cm    2.0 - 4.0 cm  Ao:     3.3 cm    2.0 - 3.8 cm  SEPTUM: 0.7 cm    0.6 - 1.2 cm  PWT:    0.7 cm0.6 - 1.1 cm  LVIDd:  4.0 cm    3.0 - 5.6 cm  LVIDs:  2.7 cm    1.8 - 4.0 cm  Derived Variables:  LVMI: 45 g/m2  RWT: 0.35  Fractional short: 33 %  Ejection Fraction (Eunice): 61 %  ------------------------------------------------------------------------  OBSERVATIONS:  Mitral Valve: Mitral annular calcification, otherwise  normal mitral valve. Minimal mitral regurgitation.  Aortic Root: Normal aortic root.  Aortic Valve: Calcified trileaflet aortic valve with normal  opening. Moderate aortic regurgitation.  Vena contracta  width about  0.4 cm.  Left Atrium: Mildly dilated left atrium.  LA volume index =  35 cc/m2.  Left Ventricle: Normal left ventricular systolic function.  No segmental wall motion abnormalities. Normal left  ventricular internal dimensions and wall thicknesses. Mild  diastolic dysfunction (Stage I).  Right Heart: Normal right atrium. Normal right ventricular  size and function. Normal tricuspid valve. Minimal  tricuspid regurgitation. Normal pulmonic valve. Minimal  pulmonic regurgitation.  Pericardium/PleuraNormal pericardium with no pericardial  effusion.  ------------------------------------------------------------------------  CONCLUSIONS:  1. Mitral annular calcification, otherwise normal mitral  valve. Minimal mitral regurgitation.  2. Calcified trileaflet aortic valve with normal opening.  Moderate aortic regurgitation.  Vena contracta width about  0.4 cm.  3. Mildly dilated left atrium.  LA volume index = 35 cc/m2.  4. Normal left ventricular internaldimensions and wall  thicknesses.  5. Normal left ventricular systolic function. No segmental  wall motion abnormalities.  6. Mild diastolic dysfunction (Stage I).  7. Normal right ventricular size and function.  ------------------------------------------------------------------------  Confirmed on  4/23/2021 - 10:42:27 by Sergio Arizmendi M.D.,  [facility], [place]  ------------------------------------------------------------------------    < end of copied text >        "Thank you for the opportunity to participate in the care of this patient."      KATALINA GUERRA MD; Attending Cardiologist  This document has been electronically signed. Jan 12 2021  2:13PM    < end of copied text >  < from: Xray Chest 2 Views PA/Lat (06.01.21 @ 18:41) >  EXAM:  XR CHEST PA LAT 2V        PROCEDURE DATE:  Jun 1 2021         INTERPRETATION:  CLINICAL INDICATION: Chest pain.    TECHNIQUE: PA and lateral views of the chest.    COMPARISON: Chest radiograph 4/20/2021    FINDINGS:    Right chest wall infusion port with catheter tip in the SVC.  Cardiac size is within normal limits.  The lungs are clear.  There are no pleural effusions. No pneumothorax.  Degenerative changes of the thoracic spine.    IMPRESSION:  Clear lungs.    CECILIA BELCHER MD; Resident Radiology  This document has been electronically signed.  SAHIL ADKINS MD; Attending Radiologist  This document has been electronically signed. Jun 2 2021  8:12AM    < end of copied text >  < from: MR Cardiac No Cont (01.04.21 @ 22:27) >    EXAM:  MR CARD MORPH FUNCTION                          *** ADDENDUM 02/09/2021  ***    RVEF 56%  RVEDV- 117ml  RVESV- 51ml  SV: 66ml    RVEDVi- 68 ml/m2  RVESVi- 30ml/m2  SVi- 38ml/m2     Correction to report findings:    Mild to Moderate Aortic regurgitation based  on regurgitation volume 25ml  and Regurgitation fraction of 38%.      *** END OF ADDENDUM 02/09/2021  ***      PROCEDURE DATE:  01/04/2021          INTERPRETATION:  I. Clinical Information: 69 yo F with Aortic Insufficiency    II. Technique: Comprehensive Cardiac MRI examination was performed on a 1.5 T scanner using standard cardiac coil, cardiac gating, and standard pulse sequences for the assessment of cardiac morphology, function, and viability.  .    Ht: 170cm  Wt: 62kg  BSA: 1.71    Contrast:  mL of Multihance / Gadavist    III. Comparison: No images available for comparison.    IV. Findings    A. Cardiac morphology:    1. Left ventricle  a. Global systolic function: Global hypokinesis  b. Cavity size: Normal  c. Wall thickness: Normal    2. Right ventricle  a. Global systolic function: Normal  b. Cavity size: Normal    3. Aortic valve  a. Leaflets:  Calcified  b. Aortic Regurgitation: Moderate to severe AI  c. Aortic stenosis: Absent    4. Mitral valve  a. Leaflets: Normal  b. Leaflet mobility: Normal  c. Mitral regurgitation: Absent  d. Mitral stenosis: Absent    5. Tricuspid valve  a. Leaflets: Normal  b. Leaflet mobility: Normal  c. Tricuspid regurgitation: Absent  d. Tricuspid stenosis: Absent    6.Pulmonic valve  a. Leaflets: Normal  b. PI: Absent  c. PS: Absent    7. Left atrium  a. Size: Normal  b. Left atrial volume: 18ml  c. Left atrial volume index (Biplane method): 10mL/m2    8. Right atrium  a. Size:  Normal  b. Right atrial area: 14cm2    9. Pericardium  a.Effusion: None    10. Aorta  No significant abnormalities in the visualized portions of the thoracic aorta    11. Pulmonary artery  No significant abnormalities in the visualized portions of the pulmonary artery    B. Ventricular volume measurements    LVEF: 72%  LVEDV: 110mL  LVESV:30mL  SV: 80mL    LVEDVi:  64mL/m2  LVESVi: 18 mL/2  LVSVi: 47 mL/m2        C.Myocardial wall motion by regions (using 17 segment model):    1.Base  a. Anterior:  Normal  b. Anteroseptal: Normal  c. Anterolateral: Normal  d. Inferior: Normal  e. Inferoseptal:  Normal  f. Inferolateral:  Normal    2.  Mid  a. Anterior:  Normal  b. Anteroseptal:  Normal  c. Anterolateral:  Normal  d. Inferior:  Normal  e. Inferoseptal:  Normal  f. Inferolateral:  Normal    3.Apical  a. Anterior: Normal  b. Septal:    Normal  c. Inferior:    Normal  d. Lateral:    Normal    4.True [place]: Normal        E. Q-flow analysis (stenosis/regurgitation/shunt):    Qp:  Stroke volume:38 ml  Forward flow: 38ml  Backward flow: 0 ml  Regurgitant fraction: 0%    Qs:  Stroke volume: 41ml  Forward flow: 65ml  Backward flow: 25ml  Regurgitant fraction: 38%    Qp/Qs:0.92        INTERPRETATION:  1.  The left ventricle (LV) is normal in size. There is no evidence of LV hypertrophy. Left ventricular global systolic function is reduced The LV ejection fraction is  72 %.  2.  There is Moderate to Severe aortic insufficiency  3.  The right ventricle (RV) is normal in size. RV global systolic function is Normal  4.  No significant abnormalities of the visualized portions of the great vessels.      The sequences used in this study were designed for imaging cardiac structures and are suboptimal for imaging other structures and organs    ***Please see the addendum at the top of this report. It may contain additional important information or changes.****      Thank you for the opportunity to participate in the care of this patient.      MINISTERIO ROWLAND MD; Attending Cardiologist  This document has been electronically signed. Jan 52021  4:31PM  Addend:MINISTERIO ROWLAND MD; Attending Cardiologist  This addendum was electronically signed on: Feb 9 2021  6:39PM.    < end of copied text >

## 2021-06-02 NOTE — PROGRESS NOTE ADULT - SUBJECTIVE AND OBJECTIVE BOX
[facility] Division of Hospital Medicine  Mira Claros MD  Pager [phone]    Patient is a 70y old  Female who presents with a chief complaint of Palpitations with associated SOB and lightheadedness    SUBJECTIVE / OVERNIGHT EVENTS: reports palps much better and no CP; reported CP was like a sticking feeling, very brief; ate breakfast well      MEDICATIONS  (STANDING):  apixaban 5 milliGRAM(s) Oral every 12 hours  atorvastatin 20 milliGRAM(s) Oral at bedtime  budesonide 160 MICROgram(s)/formoterol 4.5 MICROgram(s) Inhaler 2 Puff(s) Inhalation two times a day  chlorhexidine 2% Cloths 1 Application(s) Topical daily  dextrose 40% Gel 15 Gram(s) Oral once  dextrose 5%. 1000 milliLiter(s) (50 mL/Hr) IV Continuous <Continuous>  dextrose 5%. 1000 milliLiter(s) (100 mL/Hr) IV Continuous <Continuous>  dextrose 50% Injectable 25 Gram(s) IV Push once  dextrose 50% Injectable 12.5 Gram(s) IV Push once  dextrose 50% Injectable 25 Gram(s) IV Push once  diltiazem    Tablet 60 milliGRAM(s) Oral every 12 hours  glucagon  Injectable 1 milliGRAM(s) IntraMuscular once  insulin glargine Injectable (LANTUS) 12 Unit(s) SubCutaneous at bedtime  insulin lispro (ADMELOG) corrective regimen sliding scale   SubCutaneous three times a day before meals  insulin lispro (ADMELOG) corrective regimen sliding scale   SubCutaneous at bedtime  methylPREDNISolone 4 milliGRAM(s) Oral daily  mexiletine 200 milliGRAM(s) Oral three times a day  montelukast 10 milliGRAM(s) Oral daily  pantoprazole    Tablet 40 milliGRAM(s) Oral before breakfast  roflumilast 250 MICROGram(s) Oral daily  senna 2 Tablet(s) Oral at bedtime  tiotropium 18 MICROgram(s) Capsule 1 Capsule(s) Inhalation daily    MEDICATIONS  (PRN):  albuterol/ipratropium for Nebulization 3 milliLiter(s) Nebulizer every 6 hours PRN Shortness of Breath and/or Wheezing  ondansetron    Tablet 4 milliGRAM(s) Oral three times a day PRN Nausea and/or Vomiting  polyethylene glycol 3350 17 Gram(s) Oral daily PRN Constipation      CAPILLARY BLOOD GLUCOSE  POCT Blood Glucose.: 152 mg/dL (02 Jun 2021 09:01)  POCT Blood Glucose.: 127 mg/dL (01 Jun 2021 17:56)          PHYSICAL EXAM:  Vital Signs Last 24 Hrs  T(F): 97.9 (02 Jun 2021 05:23), Max: 98.5 (01 Jun 2021 20:34)  HR: 57 (02 Jun 2021 05:23) (57 - 78)  BP: 155/63 (02 Jun 2021 05:23) (139/50 - 176/50)  RR: 16 (02 Jun 2021 05:23) (16 - 22)  SpO2: 100% (02 Jun 2021 05:23) (99% - 100%)    CONSTITUTIONAL: NAD, thin  RESPIRATORY: Normal respiratory effort; good AE b/l  CARDIOVASCULAR: Regular rate and rhythm; No lower extremity edema;   ABDOMEN: Nontender to palpation, normoactive bowel sounds  MUSCULOSKELETAL:  no joint swelling or tenderness to palpation  PSYCH:  affect appropriate  NEUROLOGY: no gross sensory deficits       LABS:                        12.1   6.61  )-----------( 284      ( 02 Jun 2021 07:54 )             39.0     06-02    141  |  105  |  12  ----------------------------<  110<H>  4.0   |  25  |  0.70    Ca    9.1      02 Jun 2021 07:54  Phos  3.5     06-02  Mg     2.0     06-02    TPro  6.2  /  Alb  3.6  /  TBili  0.2  /  DBili  x   /  AST  20  /  ALT  19  /  AlkPhos  86  06-02

## 2021-06-02 NOTE — H&P ADULT - NSHPREVIEWOFSYSTEMS_GEN_ALL_CORE
REVIEW OF SYSTEMS:    CONSTITUTIONAL: No weakness, fevers or chills  EYES: No visual changes or eye discharge  ENT: No rhinorrhea or sore throat  NECK: No pain or stiffness  RESPIRATORY: +Chronic cough with chronic mild sputum production related to her asthma/bronchiectasis, no changes, no wheezing; +SOB related to palpitations, otherwise no SOB  CARDIOVASCULAR: +palpitations, now resolved, No chest pain; No lower extremity edema  GASTROINTESTINAL: No abdominal or epigastric pain. No nausea, vomiting, or hematemesis; No diarrhea or constipation. No melena or hematochezia.  BACK: No back pain, no flank pain  GENITOURINARY: No dysuria, frequency or hematuria  NEUROLOGICAL: No numbness or weakness  SKIN: No itching, burning, rashes, or lesions

## 2021-06-02 NOTE — H&P ADULT - NSHPOUTPATIENTPROVIDERS_GEN_ALL_CORE
PCP - Dr. Kellee jimenez  Cards - Dr. Rachel cardoso  EP - Dr. Genesis Aragon  Pulm - Dr. Eulalio Allison PCP - Dr. Kellee jimenez  Cards - Dr. Rico Glover  EP - Dr. Genesis Aragon  Pulm - Dr. Eulalio Allison

## 2021-06-02 NOTE — CHART NOTE - NSCHARTNOTEFT_GEN_A_CORE
confirmed with pharmacy dupixant is not available in the hospital, pt trying to see if she can get home medication delivered due for dose today

## 2021-06-02 NOTE — H&P ADULT - NSHPPHYSICALEXAM_GEN_ALL_CORE
Vital Signs Last 24 Hrs  T(C): 36.9 (02 Jun 2021 00:36), Max: 36.9 (01 Jun 2021 16:25)  T(F): 98.5 (02 Jun 2021 00:36), Max: 98.5 (01 Jun 2021 20:34)  HR: 68 (02 Jun 2021 00:36) (66 - 78)  BP: 165/66 (02 Jun 2021 00:36) (139/50 - 176/50)  BP(mean): --  RR: 16 (02 Jun 2021 00:36) (16 - 22)  SpO2: 100% (02 Jun 2021 00:36) (99% - 100%)    GENERAL: No acute distress, well-developed  ENT: EOMI, PERRL, conjunctiva and sclera clear, Neck supple, No JVD, moist mucosa  CHEST/LUNG: Clear to auscultation bilaterally; No wheeze, equal breath sounds bilaterally   BACK: No spinal tenderness, no CVA tenderness  HEART: Regular rate and rhythm; No murmurs, rubs, or gallops  ABDOMEN: Soft, Nontender, Nondistended; Bowel sounds present  EXTREMITIES: 2+ DP/PT pulses, No clubbing, cyanosis, or edema  PSYCH: Nl behavior, nl affect  NEUROLOGY: AAOx3, non-focal, cranial nerves intact  SKIN: Normal color, No rashes or lesions

## 2021-06-02 NOTE — H&P ADULT - PROBLEM SELECTOR PLAN 7
DVT ppx - on eliquis  Diet - DASH/CC regular diet  Activity - Ambulate with walker, fall precautions

## 2021-06-02 NOTE — H&P ADULT - PROBLEM SELECTOR PLAN 1
- Likely had episode of bigeminy vs PVCs vs other arrhythmia at home today  - No episodes here  - Would c/w telemetry monitoring  - c/w mexiletine  - EP eval in AM, ?loop recorder

## 2021-06-02 NOTE — H&P ADULT - NSHPLABSRESULTS_GEN_ALL_CORE
LABS and ADDITIONAL STUDIES:                        11.7   7.91  )-----------( 287      ( 01 Jun 2021 17:27 )             38.3     06-01    142  |  106  |  13  ----------------------------<  106<H>  3.8   |  26  |  0.68    Ca    8.7      01 Jun 2021 17:27  Mg     1.7     06-01    TPro  6.4  /  Alb  3.6  /  TBili  <0.2  /  DBili  x   /  AST  18  /  ALT  19  /  AlkPhos  83  06-01    LIVER FUNCTIONS - ( 01 Jun 2021 17:27 )  Alb: 3.6 g/dL / Pro: 6.4 g/dL / ALK PHOS: 83 U/L / ALT: 19 U/L / AST: 18 U/L / GGT: x           Troponin T, High Sensitivity (06.01.21 @ 17:27)   Troponin T, High Sensitivity Result: 17  Troponin T, High Sensitivity (06.01.21 @ 18:27)   Troponin T, High Sensitivity Result: 17    < from: Xray Chest 2 Views PA/Lat (06.01.21 @ 18:41) >  ******PRELIMINARY REPORT******        INTERPRETATION:  clear lungs  < end of copied text >    EKG - LABS and ADDITIONAL STUDIES:                        11.7   7.91  )-----------( 287      ( 01 Jun 2021 17:27 )             38.3     06-01    142  |  106  |  13  ----------------------------<  106<H>  3.8   |  26  |  0.68    Ca    8.7      01 Jun 2021 17:27  Mg     1.7     06-01    TPro  6.4  /  Alb  3.6  /  TBili  <0.2  /  DBili  x   /  AST  18  /  ALT  19  /  AlkPhos  83  06-01    LIVER FUNCTIONS - ( 01 Jun 2021 17:27 )  Alb: 3.6 g/dL / Pro: 6.4 g/dL / ALK PHOS: 83 U/L / ALT: 19 U/L / AST: 18 U/L / GGT: x           Troponin T, High Sensitivity (06.01.21 @ 17:27)   Troponin T, High Sensitivity Result: 17  Troponin T, High Sensitivity (06.01.21 @ 18:27)   Troponin T, High Sensitivity Result: 17    < from: Xray Chest 2 Views PA/Lat (06.01.21 @ 18:41) >  ******PRELIMINARY REPORT******        INTERPRETATION:  clear lungs  < end of copied text >    EKG - SNR at 68, QTc 418, T wave flat III, otherwise no other significant ST-T wave changes, prior bigeminy not present

## 2021-06-03 ENCOUNTER — TRANSCRIPTION ENCOUNTER (OUTPATIENT)
Age: 71
End: 2021-06-03

## 2021-06-03 LAB
ANION GAP SERPL CALC-SCNC: 11 MMOL/L — SIGNIFICANT CHANGE UP (ref 7–14)
BUN SERPL-MCNC: 15 MG/DL — SIGNIFICANT CHANGE UP (ref 7–23)
CALCIUM SERPL-MCNC: 9 MG/DL — SIGNIFICANT CHANGE UP (ref 8.4–10.5)
CHLORIDE SERPL-SCNC: 105 MMOL/L — SIGNIFICANT CHANGE UP (ref 98–107)
CO2 SERPL-SCNC: 26 MMOL/L — SIGNIFICANT CHANGE UP (ref 22–31)
COVID-19 SPIKE DOMAIN AB INTERP: POSITIVE
COVID-19 SPIKE DOMAIN ANTIBODY RESULT: >250 U/ML — HIGH
CREAT SERPL-MCNC: 0.73 MG/DL — SIGNIFICANT CHANGE UP (ref 0.5–1.3)
GLUCOSE BLDC GLUCOMTR-MCNC: 146 MG/DL — HIGH (ref 70–99)
GLUCOSE BLDC GLUCOMTR-MCNC: 179 MG/DL — HIGH (ref 70–99)
GLUCOSE BLDC GLUCOMTR-MCNC: 184 MG/DL — HIGH (ref 70–99)
GLUCOSE BLDC GLUCOMTR-MCNC: 214 MG/DL — HIGH (ref 70–99)
GLUCOSE SERPL-MCNC: 136 MG/DL — HIGH (ref 70–99)
HCT VFR BLD CALC: 39.7 % — SIGNIFICANT CHANGE UP (ref 34.5–45)
HGB BLD-MCNC: 12.1 G/DL — SIGNIFICANT CHANGE UP (ref 11.5–15.5)
MAGNESIUM SERPL-MCNC: 2.2 MG/DL — SIGNIFICANT CHANGE UP (ref 1.6–2.6)
MCHC RBC-ENTMCNC: 22.6 PG — LOW (ref 27–34)
MCHC RBC-ENTMCNC: 30.5 GM/DL — LOW (ref 32–36)
MCV RBC AUTO: 74.1 FL — LOW (ref 80–100)
NRBC # BLD: 0 /100 WBCS — SIGNIFICANT CHANGE UP
NRBC # FLD: 0 K/UL — SIGNIFICANT CHANGE UP
PHOSPHATE SERPL-MCNC: 3.6 MG/DL — SIGNIFICANT CHANGE UP (ref 2.5–4.5)
PLATELET # BLD AUTO: 273 K/UL — SIGNIFICANT CHANGE UP (ref 150–400)
POTASSIUM SERPL-MCNC: 3.7 MMOL/L — SIGNIFICANT CHANGE UP (ref 3.5–5.3)
POTASSIUM SERPL-SCNC: 3.7 MMOL/L — SIGNIFICANT CHANGE UP (ref 3.5–5.3)
RBC # BLD: 5.36 M/UL — HIGH (ref 3.8–5.2)
RBC # FLD: 16.7 % — HIGH (ref 10.3–14.5)
SARS-COV-2 IGG+IGM SERPL QL IA: >250 U/ML — HIGH
SARS-COV-2 IGG+IGM SERPL QL IA: POSITIVE
SODIUM SERPL-SCNC: 142 MMOL/L — SIGNIFICANT CHANGE UP (ref 135–145)
WBC # BLD: 6.31 K/UL — SIGNIFICANT CHANGE UP (ref 3.8–10.5)
WBC # FLD AUTO: 6.31 K/UL — SIGNIFICANT CHANGE UP (ref 3.8–10.5)

## 2021-06-03 PROCEDURE — 99233 SBSQ HOSP IP/OBS HIGH 50: CPT

## 2021-06-03 RX ORDER — POTASSIUM CHLORIDE 20 MEQ
20 PACKET (EA) ORAL ONCE
Refills: 0 | Status: COMPLETED | OUTPATIENT
Start: 2021-06-03 | End: 2021-06-03

## 2021-06-03 RX ORDER — POLYETHYLENE GLYCOL 3350 17 G/17G
17 POWDER, FOR SOLUTION ORAL
Refills: 0 | Status: DISCONTINUED | OUTPATIENT
Start: 2021-06-03 | End: 2021-06-07

## 2021-06-03 RX ADMIN — BUDESONIDE AND FORMOTEROL FUMARATE DIHYDRATE 2 PUFF(S): 160; 4.5 AEROSOL RESPIRATORY (INHALATION) at 21:59

## 2021-06-03 RX ADMIN — ROFLUMILAST 250 MICROGRAM(S): 500 TABLET ORAL at 10:59

## 2021-06-03 RX ADMIN — Medication 20 MILLIEQUIVALENT(S): at 10:59

## 2021-06-03 RX ADMIN — MEXILETINE HYDROCHLORIDE 200 MILLIGRAM(S): 150 CAPSULE ORAL at 05:50

## 2021-06-03 RX ADMIN — POLYETHYLENE GLYCOL 3350 17 GRAM(S): 17 POWDER, FOR SOLUTION ORAL at 09:36

## 2021-06-03 RX ADMIN — PANTOPRAZOLE SODIUM 40 MILLIGRAM(S): 20 TABLET, DELAYED RELEASE ORAL at 05:50

## 2021-06-03 RX ADMIN — Medication 2: at 12:52

## 2021-06-03 RX ADMIN — SENNA PLUS 2 TABLET(S): 8.6 TABLET ORAL at 21:58

## 2021-06-03 RX ADMIN — APIXABAN 5 MILLIGRAM(S): 2.5 TABLET, FILM COATED ORAL at 05:50

## 2021-06-03 RX ADMIN — MONTELUKAST 10 MILLIGRAM(S): 4 TABLET, CHEWABLE ORAL at 10:59

## 2021-06-03 RX ADMIN — Medication 4 MILLIGRAM(S): at 05:50

## 2021-06-03 RX ADMIN — CHLORHEXIDINE GLUCONATE 1 APPLICATION(S): 213 SOLUTION TOPICAL at 12:54

## 2021-06-03 RX ADMIN — Medication 1: at 17:30

## 2021-06-03 RX ADMIN — APIXABAN 5 MILLIGRAM(S): 2.5 TABLET, FILM COATED ORAL at 17:29

## 2021-06-03 RX ADMIN — POLYETHYLENE GLYCOL 3350 17 GRAM(S): 17 POWDER, FOR SOLUTION ORAL at 17:30

## 2021-06-03 RX ADMIN — MEXILETINE HYDROCHLORIDE 200 MILLIGRAM(S): 150 CAPSULE ORAL at 21:58

## 2021-06-03 RX ADMIN — Medication 60 MILLIGRAM(S): at 17:29

## 2021-06-03 RX ADMIN — BUDESONIDE AND FORMOTEROL FUMARATE DIHYDRATE 2 PUFF(S): 160; 4.5 AEROSOL RESPIRATORY (INHALATION) at 09:36

## 2021-06-03 RX ADMIN — ATORVASTATIN CALCIUM 20 MILLIGRAM(S): 80 TABLET, FILM COATED ORAL at 21:58

## 2021-06-03 RX ADMIN — INSULIN GLARGINE 12 UNIT(S): 100 INJECTION, SOLUTION SUBCUTANEOUS at 22:01

## 2021-06-03 RX ADMIN — MEXILETINE HYDROCHLORIDE 200 MILLIGRAM(S): 150 CAPSULE ORAL at 14:35

## 2021-06-03 RX ADMIN — TIOTROPIUM BROMIDE 1 CAPSULE(S): 18 CAPSULE ORAL; RESPIRATORY (INHALATION) at 09:36

## 2021-06-03 NOTE — DISCHARGE NOTE PROVIDER - CARE PROVIDER_API CALL
appropriate/DEPRESSED APPEARING Kellee Honeycutt)  Internal Medicine  [address] [place] [address]  Phone: [phone]  Fax: [phone]  Established Patient  Follow Up Time: 1 week    Genesis Aragon)  Cardiac Electrophysiology; Cardiology; Internal Medicine  [address] [place] [address]  Phone: [phone]  Fax: [phone]  Established Patient  Scheduled Appointment: 05/13/2021 02:00 PM    Pepito Allison)  Family Medicine  [address] [place] [address]  Phone: [phone]  Fax: [phone]  Established Patient  Follow Up Time: 1 week

## 2021-06-03 NOTE — CONSULT NOTE ADULT - ASSESSMENT
This is a 70 year old woman with a pmhx of mod-severe AI and PAF (on Eliquis), tracheobronchomalacia s/p tracheobronchoplasty, lung nodules, severe allergic asthma, adrenal insufficiency, bronchiectasis, T2DM, HTN, CRC s/p colostomy who presented to [facility] ED from pulmonary rehab due to worsening SOB and palpitation. According to the patient, this is different from her typical asthma exacerbation and stated that  her palpitations lead to her sob. Patient believes that she went into" Afib " on Saturday  based on her worsening symptoms. There is no EKG or telemetric strip showing this. She also stated that the palpitation was associated with a band-like tightness around her abdomen, weakness, and dizziness which she has been experiencing for the past year. Pulmonology started her on prednisone 40mg x 5 days given possible asthma exacerbation with plans to restart home medrol after. Ep was consulted for Afib and bradycardia. Upon my evaluation, patient was in SR with frequent PVC and bigeminy HR 70-90s.     - Continuous telemetric monitoring for PVC burden, Afib and bradycardia  - Monitor electrolytes and replete K to 4 and Mg to 2  - Continue Eliquis 5mg po qd for thromboembolic ppx as patient has a [id number] score of 4 (Age, Sex, HTN, T2DM)  - Obtain a TTE   - Continue home diltiazem 30mg po BID and monitor HR and BP  - Appreciate pulmonology recs  - Continue care per primary team     Silvia Bailey PA-C  Patient to be staffed with attending. Please await attending addendum 70 year old Female  with PMH of pAFib (on Eliquis and Cardizem), tracheobronchomalacia s/p tracheobronchoplasty, lung nodules, severe allergic asthma, adrenal insufficiency, T2DM, HTN, Colon CA s/p colostomy. Patient presented to [facility] ED with c/o worsening SOB/palpitations, symptoms are similar to recent  April admission when she was evaluated/treated  by Dr. Aragon. She was started on Mexiletine and monitored closely during hospital stay. Follows with Pj Glover (5/27) and Mahesh 5/13) on an outpatient basis.        Continue telemetry monitoring.  Mexiletine 200mg TID  Cardizem 60mg twice daily    CHADVASC Score= 4     Eliquis 5mg twice daily   Monitor lytes and replete K>4.0 and Mg>2.0.   Monitor TSH and LFTs  Appreciate Cardiology Recommendations  Follow up with Pulmonology (Dr. Allison)  Management per Primary Team  Will continue to monitor for PVC burden             70 year old Female  with PMH of pAFib (on Eliquis and Cardizem), tracheobronchomalacia s/p tracheobronchoplasty, lung nodules, severe allergic asthma, adrenal insufficiency, T2DM, HTN, Colon CA s/p colostomy. Patient presented to [facility] ED with c/o worsening SOB/palpitations, symptoms are similar to recent  April admission when she was evaluated/treated  by Dr. Aragon. She was started on Mexiletine and monitored closely during hospital stay. Follows with Pj Glover (5/27) and Mahesh 5/13) on an outpatient basis.        EP Consult request for PVC re-evaluation on 6/3. Patient states she is feeling 'much better today compared to yesterday' and 'very few palpitations'.          Continue telemetry monitoring.  Mexiletine 200mg TID  Cardizem 60mg twice daily    CHADVASC Score= 4     Eliquis 5mg twice daily   Monitor lytes and replete K>4.0 and Mg>2.0.   Monitor TSH and LFTs  Appreciate Cardiology Recommendations  Follow up with Pulmonology (Dr. Allison)  Management per Primary Team  Will continue to monitor for PVC burden

## 2021-06-03 NOTE — PROGRESS NOTE ADULT - SUBJECTIVE AND OBJECTIVE BOX
[facility] Division of Hospital Medicine  Mira Claros MD  Pager [phone]    Patient is a 70y old  Female who presents with a chief complaint of Palpitations with associated SOB and lightheadedness    SUBJECTIVE / OVERNIGHT EVENTS: upon my eval this AM, pt denied further episodes of palp or CP; however, shortly after my visit, pt sustained 30 sec of bigeminy      MEDICATIONS  (STANDING):  apixaban 5 milliGRAM(s) Oral every 12 hours  atorvastatin 20 milliGRAM(s) Oral at bedtime  budesonide 160 MICROgram(s)/formoterol 4.5 MICROgram(s) Inhaler 2 Puff(s) Inhalation two times a day  chlorhexidine 2% Cloths 1 Application(s) Topical daily  dextrose 40% Gel 15 Gram(s) Oral once  dextrose 5%. 1000 milliLiter(s) (50 mL/Hr) IV Continuous <Continuous>  dextrose 5%. 1000 milliLiter(s) (100 mL/Hr) IV Continuous <Continuous>  dextrose 50% Injectable 25 Gram(s) IV Push once  dextrose 50% Injectable 12.5 Gram(s) IV Push once  dextrose 50% Injectable 25 Gram(s) IV Push once  diltiazem    Tablet 60 milliGRAM(s) Oral every 12 hours  glucagon  Injectable 1 milliGRAM(s) IntraMuscular once  insulin glargine Injectable (LANTUS) 12 Unit(s) SubCutaneous at bedtime  insulin lispro (ADMELOG) corrective regimen sliding scale   SubCutaneous three times a day before meals  insulin lispro (ADMELOG) corrective regimen sliding scale   SubCutaneous at bedtime  methylPREDNISolone 4 milliGRAM(s) Oral daily  mexiletine 200 milliGRAM(s) Oral three times a day  montelukast 10 milliGRAM(s) Oral daily  pantoprazole    Tablet 40 milliGRAM(s) Oral before breakfast  polyethylene glycol 3350 17 Gram(s) Oral two times a day  roflumilast 250 MICROGram(s) Oral daily  senna 2 Tablet(s) Oral at bedtime  tiotropium 18 MICROgram(s) Capsule 1 Capsule(s) Inhalation daily    MEDICATIONS  (PRN):  albuterol/ipratropium for Nebulization 3 milliLiter(s) Nebulizer every 6 hours PRN Shortness of Breath and/or Wheezing  ondansetron    Tablet 4 milliGRAM(s) Oral three times a day PRN Nausea and/or Vomiting      CAPILLARY BLOOD GLUCOSE  POCT Blood Glucose.: 214 mg/dL (03 Jun 2021 12:28)  POCT Blood Glucose.: 146 mg/dL (03 Jun 2021 08:38)  POCT Blood Glucose.: 140 mg/dL (02 Jun 2021 21:19)  POCT Blood Glucose.: 244 mg/dL (02 Jun 2021 17:30)        PHYSICAL EXAM:  Vital Signs Last 24 Hrs  T(F): 98.5 (03 Jun 2021 12:07), Max: 98.6 (02 Jun 2021 21:32)  HR: 70 (03 Jun 2021 12:07) (57 - 70)  BP: 143/68 (03 Jun 2021 12:07) (136/54 - 148/66)  RR: 18 (03 Jun 2021 12:07) (16 - 18)  SpO2: 98% (03 Jun 2021 12:07) (98% - 100%)    CONSTITUTIONAL: NAD, thin  RESPIRATORY: Normal respiratory effort; lungs are clear to auscultation bilaterally  CARDIOVASCULAR: Regular rate and rhythm; No lower extremity edema;   ABDOMEN: Nontender to palpation, normoactive bowel sounds  MUSCULOSKELETAL:  no joint swelling or tenderness to palpation  PSYCH: affect appropriate  NEUROLOGY: no gross sensory deficits   S    LABS:                        12.1   6.31  )-----------( 273      ( 03 Jun 2021 07:36 )             39.7     06-03    142  |  105  |  15  ----------------------------<  136<H>  3.7   |  26  |  0.73    Ca    9.0      03 Jun 2021 07:36  Phos  3.6     06-03  Mg     2.2     06-03

## 2021-06-03 NOTE — CONSULT NOTE ADULT - ATTENDING COMMENTS
70 year old Female  with PMH of pAFib (on Eliquis and Cardizem), tracheobronchomalacia s/p tracheobronchoplasty, lung nodules, severe allergic asthma, adrenal insufficiency, T2DM, HTN, Colon CA s/p colostomy. Patient presented to [facility] ED with c/o worsening SOB/palpitations, symptoms are similar to recent  April admission when she was evaluated/treated  by Dr. Aragon. She was started on Mexiletine and monitored closely during hospital stay. Follows with Pj Glover (5/27) and Mahesh 5/13) on an outpatient basis. Cont to follow on tele for PVCs. Cont emely for now.

## 2021-06-03 NOTE — DISCHARGE NOTE PROVIDER - NSDCFUSCHEDAPPT_GEN_ALL_CORE_FT
RAYRAY RODRIGUEZ ; 07/07/2021 ; [facility] Med GenInt [address] [facility]  RAYRAY RODRIGUEZ ; 07/21/2021 ; [facility] PulmMed [address] [facility]  RAYRAY RODRIGUEZ ; 07/21/2021 ; [facility] Puled [address] [facility]

## 2021-06-03 NOTE — DISCHARGE NOTE PROVIDER - CARE PROVIDER_API CALL
Genesis Aragon (MD)  Cardiac Electrophysiology; Cardiology; Internal Medicine  [address] [place] [id number]  Phone: [phone]  Fax: [phone]  Follow Up Time:     Rcio Glover; PhD)  Cardiology; Internal Medicine; Vascular Medicine  [address] [place] [address], [address]  [place] [address]  Phone: [phone]  Fax: [phone]  Follow Up Time:     Kellee Honeycutt)  Internal Medicine  [address] [place] [address]  Phone: [phone]  Fax: [phone]  Follow Up Time:

## 2021-06-03 NOTE — DISCHARGE NOTE PROVIDER - PROVIDER TOKENS
PROVIDER:[TOKEN:[[id number]]],PROVIDER:[TOKEN:[[id number]]],PROVIDER:[TOKEN:[[id number]]]

## 2021-06-03 NOTE — CONSULT NOTE ADULT - SUBJECTIVE AND OBJECTIVE BOX
Patient is a 70y old  Female who presents with a chief complaint of Palpitations with associated SOB and lightheadedness (03 Jun 2021 15:11)      HPI:  70 year old Female  with PMH of pAFib (on Eliquis and Cardizem), tracheobronchomalacia s/p tracheobronchoplasty, lung nodules, severe allergic asthma, adrenal insufficiency, T2DM, HTN, Colon CA s/p colostomy. Patient presented to [facility] ED with c/o worsening SOB/palpitations, symptoms are similar to recent  April admission when she was evaluated/treated  by Dr. Aragon. She was started on Mexiletine and monitored closely during hospital stay. Follows with Pj Glover (5/27) and Mahesh 5/13) on an outpatient basis.          PAST MEDICAL & SURGICAL HISTORY:  Atrial fibrillation  paroxysmal, on eliquis    Diabetes  Type 2    COPD (chronic obstructive pulmonary disease)    Adrenal insufficiency  Medrol daily for over 50 years    Aortic insufficiency  moderate AR on echo 5/3/2018    Pelvic fracture    Asthma    Tracheobronchomalacia  diagnosed 2015, s/p bronchial thermoplasty 2016 (Dr Zapien); recent bronchoscopy 6/5/2018 revealed no evidence of tracheobronchomalacia in trachea or bronchial tubes    Colorectal cancer  4/2018- last treatment , chemo and radiation    Rectal bleeding    Seizure  x 1 1/7/18    DVT (deep venous thrombosis)  15-20 years ago, took coumadin    TIA (transient ischemic attack)  multiple, last 5 years ago - presents as right-sided weakness    History of partial hysterectomy  30 years ago - fibroids    H/O total knee replacement, bilateral  5 years ago    History of sinus surgery  multiple sinus surgeries    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 by Dr Zapien    S/P bronchoscopy  6/5/2018 - [place] (Dr Zapien) no evidence of tracheobronchomalacia in trachea or bronchial tubes    Rectal bleeding  exam under anesthesia ([facility]) 2/2018        FAMILY HISTORY:  Family history of asthma    Family history of breast cancer (Sibling)    Family history of diabetes mellitus type II        Home Medications:  apixaban 5 mg oral tablet: 1 tab(s) orally every 12 hours (02 Jun 2021 02:41)  Breo Ellipta 200 mcg-25 mcg/inh inhalation powder: 1 puff(s) inhaled once a day (02 Jun 2021 02:41)  Crestor 5 mg oral tablet: 1 tab(s) orally once a day (at bedtime) (02 Jun 2021 02:41)  Daliresp 250 mcg oral tablet: 1 tab(s) orally once a day (02 Jun 2021 02:41)  dilTIAZem 30 mg oral tablet: 2 tab(s) orally every 12 hours (02 Jun 2021 02:41)  Dupixent 300 mg/2 mL subcutaneous solution: 1  subcutaneous every 2 weeks (02 Jun 2021 02:41)  HumaLOG 100 units/mL subcutaneous solution: Sliding Scale (02 Jun 2021 02:41)  ipratropium-albuterol 0.5 mg-2.5 mg/3 mLinhalation solution: 3 milliliter(s) inhaled every 6 hours (02 Jun 2021 02:41)  Lantus 100 units/mL subcutaneous solution: 15 unit(s) subcutaneous once (at bedtime) (02 Jun 2021 02:41)  methylPREDNISolone 4 mg oral tablet: 1 tab(s) orally once a day (02 Jun 2021 02:41)  MiraLax oral powder for reconstitution: 17 gram(s) orally once a day (02 Jun 2021 02:41)  Senna 8.6 mg oral tablet: 1 tab(s) orally once a day (at bedtime) (02 Jun 2021 02:41)  Singulair 10 mg oral tablet: 1 tab(s) orally once a day (02 Jun 2021 02:41)  Spiriva 18 mcg inhalation capsule: 1 cap(s) inhaled once a day (02 Jun 2021 02:41)  Victoza 18 mg/3 mL subcutaneous solution: 1.8 milligram(s) subcutaneous once a day (02 Jun 2021 02:41)  Zofran 8 mg oral tablet: 1 tab(s) orally 3 times a day, As Needed (02 Jun 2021 02:41)      Medications:  albuterol/ipratropium for Nebulization 3 milliLiter(s) Nebulizer every 6 hours PRN  apixaban 5 milliGRAM(s) Oral every 12 hours  atorvastatin 20 milliGRAM(s) Oral at bedtime  budesonide 160 MICROgram(s)/formoterol 4.5 MICROgram(s) Inhaler 2 Puff(s) Inhalation two times a day  chlorhexidine 2% Cloths 1 Application(s) Topical daily  dextrose 40% Gel 15 Gram(s) Oral once  dextrose 5%. 1000 milliLiter(s) IV Continuous <Continuous>  dextrose 5%. 1000 milliLiter(s) IV Continuous <Continuous>  dextrose 50% Injectable 25 Gram(s) IV Push once  dextrose 50% Injectable 12.5 Gram(s) IV Push once  dextrose 50% Injectable 25 Gram(s) IV Push once  diltiazem    Tablet 60 milliGRAM(s) Oral every 12 hours  glucagon  Injectable 1 milliGRAM(s) IntraMuscular once  insulin glargine Injectable (LANTUS) 12 Unit(s) SubCutaneous at bedtime  insulin lispro (ADMELOG) corrective regimen sliding scale   SubCutaneous three times a day before meals  insulin lispro (ADMELOG) corrective regimen sliding scale   SubCutaneous at bedtime  methylPREDNISolone 4 milliGRAM(s) Oral daily  mexiletine 200 milliGRAM(s) Oral three times a day  montelukast 10 milliGRAM(s) Oral daily  ondansetron    Tablet 4 milliGRAM(s) Oral three times a day PRN  pantoprazole    Tablet 40 milliGRAM(s) Oral before breakfast  polyethylene glycol 3350 17 Gram(s) Oral two times a day  roflumilast 250 MICROGram(s) Oral daily  senna 2 Tablet(s) Oral at bedtime  tiotropium 18 MICROgram(s) Capsule 1 Capsule(s) Inhalation daily      Review of systems:  10 point review of systems completed and are negative unless noted in HPI    Vitals:  T(C): 36.9 (06-03-21 @ 12:07), Max: 37 (06-02-21 @ 21:32)  HR: 70 (06-03-21 @ 12:07) (57 - 70)  BP: 143/68 (06-03-21 @ 12:07) (136/54 - 148/66)  BP(mean): --  RR: 18 (06-03-21 @ 12:07) (16 - 18)  SpO2: 98% (06-03-21 @ 12:07) (98% - 100%)    Daily     Daily     Weight (kg): 62 (06-02 @ 00:36)    I&O's Summary    02 Jun 2021 07:01  -  03 Jun 2021 07:00  --------------------------------------------------------  IN: 0 mL / OUT: 600 mL / NET: -600 mL    03 Jun 2021 07:01  -  03 Jun 2021 15:31  --------------------------------------------------------  IN: 480 mL / OUT: 800 mL / NET: -320 mL        Physical Exam:  Appearance: No Acute Distress  HEENT: PERRL  Neck:   Cardiovascular: Normal S1 S2, No murmurs/rubs/gallops  Respiratory: Clear to auscultation bilaterally  Gastrointestinal: Soft, Non-tender	  Skin: No cyanosis	  Neurologic: Non-focal  Extremities: No LE edema  Psychiatry: A & O x 3, Mood & affect appropriate    Labs:                        12.1   6.31  )-----------( 273      ( 03 Jun 2021 07:36 )             39.7     06-03    142  |  105  |  15  ----------------------------<  136<H>  3.7   |  26  |  0.73    Ca    9.0      03 Jun 2021 07:36  Phos  3.6     06-03  Mg     2.2     06-03    TPro  6.2  /  Alb  3.6  /  TBili  0.2  /  DBili  x   /  AST  20  /  ALT  19  /  AlkPhos  86  06-02              TELEMETRY:    Echocardiogram:    EKG: Patient is a 70y old  Female who presents with a chief complaint of Palpitations with associated SOB and lightheadedness (03 Jun 2021 15:11)      HPI:  70 year old Female  with PMH of pAFib (on Eliquis and Cardizem), tracheobronchomalacia s/p tracheobronchoplasty, lung nodules, severe allergic asthma, adrenal insufficiency, T2DM, HTN, Colon CA s/p colostomy. Patient presented to [facility] ED on 6/1 with c/o worsening SOB/palpitations, symptoms are similar to recent  April admission when she was evaluated/treated  by Dr. Aragon. She was started on Mexiletine and monitored closely during hospital stay. Follows with Pj lGover (5/27) and Mahesh 5/13) on an outpatient basis.        EP Consult request for PVC re-evaluation on 6/3. Patient states she is feeling 'much better today compared to yesterday' and 'very few palpitations'.          PAST MEDICAL & SURGICAL HISTORY:  Atrial fibrillation  paroxysmal, on Eliquis    Diabetes  Type 2    COPD (chronic obstructive pulmonary disease)    Adrenal insufficiency  Medrol daily for over 50 years    Aortic insufficiency  moderate AR on echo 5/3/2018    Pelvic fracture    Asthma    Tracheobronchomalacia  diagnosed 2015, s/p bronchial thermoplasty 2016 (Dr Zapien); recent bronchoscopy 6/5/2018 revealed no evidence of tracheobronchomalacia in trachea or bronchial tubes    Colorectal cancer  4/2018- last treatment , chemo and radiation    Rectal bleeding    Seizure  x 1 1/7/18    DVT (deep venous thrombosis)  15-20 years ago, took coumadin    TIA (transient ischemic attack)  multiple, last 5 years ago - presents as right-sided weakness    History of partial hysterectomy  30 years ago - fibroids    H/O total knee replacement, bilateral  5 years ago    History of sinus surgery  multiple sinus surgeries    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    H/O pelvic surgery  5 years ago - s/p fracture    History of tracheomalacia  2015 - attempted tracheal stenting ([facility])- course complicated by obstruction, respiratory failure, multiple CPR attempts -  stent discontinued; 10/20/2016 Tracheobronchoplasty (Prolene Mesh) performed at [facility] by Dr Zapien    S/P bronchoscopy  6/5/2018 - [place] (Dr Zapien) no evidence of tracheobronchomalacia in trachea or bronchial tubes    Rectal bleeding  exam under anesthesia ([facility]) 2/2018        FAMILY HISTORY:  Family history of asthma    Family history of breast cancer (Sibling)    Family history of diabetes mellitus type II        Home Medications:  apixaban 5 mg oral tablet: 1 tab(s) orally every 12 hours (02 Jun 2021 02:41)  Breo Ellipta 200 mcg-25 mcg/inh inhalation powder: 1 puff(s) inhaled once a day (02 Jun 2021 02:41)  Crestor 5 mg oral tablet: 1 tab(s) orally once a day (at bedtime) (02 Jun 2021 02:41)  Daliresp 250 mcg oral tablet: 1 tab(s) orally once a day (02 Jun 2021 02:41)  dilTIAZem 30 mg oral tablet: 2 tab(s) orally every 12 hours (02 Jun 2021 02:41)  Dupixent 300 mg/2 mL subcutaneous solution: 1  subcutaneous every 2 weeks (02 Jun 2021 02:41)  HumaLOG 100 units/mL subcutaneous solution: Sliding Scale (02 Jun 2021 02:41)  ipratropium-albuterol 0.5 mg-2.5 mg/3 mLinhalation solution: 3 milliliter(s) inhaled every 6 hours (02 Jun 2021 02:41)  Lantus 100 units/mL subcutaneous solution: 15 unit(s) subcutaneous once (at bedtime) (02 Jun 2021 02:41)  methylPREDNISolone 4 mg oral tablet: 1 tab(s) orally once a day (02 Jun 2021 02:41)  MiraLax oral powder for reconstitution: 17 gram(s) orally once a day (02 Jun 2021 02:41)  Senna 8.6 mg oral tablet: 1 tab(s) orally once a day (at bedtime) (02 Jun 2021 02:41)  Singulair 10 mg oral tablet: 1 tab(s) orally once a day (02 Jun 2021 02:41)  Spiriva 18 mcg inhalation capsule: 1 cap(s) inhaled once a day (02 Jun 2021 02:41)  Victoza 18 mg/3 mL subcutaneous solution: 1.8 milligram(s) subcutaneous once a day (02 Jun 2021 02:41)  Zofran 8 mg oral tablet: 1 tab(s) orally 3 times a day, As Needed (02 Jun 2021 02:41)      Medications:  albuterol/ipratropium for Nebulization 3 milliLiter(s) Nebulizer every 6 hours PRN  apixaban 5 milliGRAM(s) Oral every 12 hours  atorvastatin 20 milliGRAM(s) Oral at bedtime  budesonide 160 MICROgram(s)/formoterol 4.5 MICROgram(s) Inhaler 2 Puff(s) Inhalation two times a day  chlorhexidine 2% Cloths 1 Application(s) Topical daily  dextrose 40% Gel 15 Gram(s) Oral once  dextrose 5%. 1000 milliLiter(s) IV Continuous <Continuous>  dextrose 5%. 1000 milliLiter(s) IV Continuous <Continuous>  dextrose 50% Injectable 25 Gram(s) IV Push once  dextrose 50% Injectable 12.5 Gram(s) IV Push once  dextrose 50% Injectable 25 Gram(s) IV Push once  diltiazem    Tablet 60 milliGRAM(s) Oral every 12 hours  glucagon  Injectable 1 milliGRAM(s) IntraMuscular once  insulin glargine Injectable (LANTUS) 12 Unit(s) SubCutaneous at bedtime  insulin lispro (ADMELOG) corrective regimen sliding scale   SubCutaneous three times a day before meals  insulin lispro (ADMELOG) corrective regimen sliding scale   SubCutaneous at bedtime  methylPREDNISolone 4 milliGRAM(s) Oral daily  mexiletine 200 milliGRAM(s) Oral three times a day  montelukast 10 milliGRAM(s) Oral daily  ondansetron    Tablet 4 milliGRAM(s) Oral three times a day PRN  pantoprazole    Tablet 40 milliGRAM(s) Oral before breakfast  polyethylene glycol 3350 17 Gram(s) Oral two times a day  roflumilast 250 MICROGram(s) Oral daily  senna 2 Tablet(s) Oral at bedtime  tiotropium 18 MICROgram(s) Capsule 1 Capsule(s) Inhalation daily      Review of systems:  10 point review of systems completed and are negative unless noted in HPI    Vitals:  T(C): 36.9 (06-03-21 @ 12:07), Max: 37 (06-02-21 @ 21:32)  HR: 70 (06-03-21 @ 12:07) (57 - 70)  BP: 143/68 (06-03-21 @ 12:07) (136/54 - 148/66)  BP(mean): --  RR: 18 (06-03-21 @ 12:07) (16 - 18)  SpO2: 98% (06-03-21 @ 12:07) (98% - 100%)    Daily     Daily     Weight (kg): 62 (06-02 @ 00:36)    I&O's Summary    02 Jun 2021 07:01  -  03 Jun 2021 07:00  --------------------------------------------------------  IN: 0 mL / OUT: 600 mL / NET: -600 mL    03 Jun 2021 07:01  -  03 Jun 2021 15:31  --------------------------------------------------------  IN: 480 mL / OUT: 800 mL / NET: -320 mL        Physical Exam:  Appearance: No Acute Distress  Cardiovascular: Normal S1 S2, No murmurs/rubs/gallops  Respiratory: Clear to auscultation bilaterally  Gastrointestinal: Soft, Non-tender	  Skin: No cyanosis	  Neurologic: Non-focal  Extremities: No LE edema  Psychiatry: A & O x 3, Mood & affect appropriate    Labs:                        12.1   6.31  )-----------( 273      ( 03 Jun 2021 07:36 )             39.7     06-03    142  |  105  |  15  ----------------------------<  136<H>  3.7   |  26  |  0.73    Ca    9.0      03 Jun 2021 07:36  Phos  3.6     06-03  Mg     2.2     06-03    TPro  6.2  /  Alb  3.6  /  TBili  0.2  /  DBili  x   /  AST  20  /  ALT  19  /  AlkPhos  86  06-02        TELEMETRY: Sinus Rhythm with occasional PVCs    Echocardiogram:    Transthoracic Echocardiogram (04.23.21 @ 09:26)   DIMENSIONS:  Dimensions:     Normal Values:  LA:     4.1 cm    2.0 - 4.0 cm  Ao:     3.3 cm    2.0 - 3.8 cm  SEPTUM: 0.7 cm    0.6 - 1.2 cm  PWT:    0.7 cm0.6 - 1.1 cm  LVIDd:  4.0 cm    3.0 - 5.6 cm  LVIDs:  2.7 cm    1.8 - 4.0 cm  Derived Variables:  LVMI: 45 g/m2  RWT: 0.35  Fractional short: 33 %  Ejection Fraction (Teicholtz): 61 %  ------------------------------------------------------------------------  OBSERVATIONS:  Mitral Valve: Mitral annular calcification, otherwise  normal mitral valve. Minimal mitral regurgitation.  Aortic Root: Normal aortic root.  Aortic Valve: Calcified trileaflet aortic valve with normal  opening. Moderate aortic regurgitation.  Vena contracta  width about  0.4 cm.  Left Atrium: Mildly dilated left atrium.  LA volume index =  35 cc/m2.  Left Ventricle: Normal left ventricular systolic function.  No segmental wall motion abnormalities. Normal left  ventricular internal dimensions and wall thicknesses. Mild  diastolic dysfunction (Stage I).  Right Heart: Normal right atrium. Normal right ventricular  size and function. Normal tricuspid valve. Minimal  tricuspid regurgitation. Normal pulmonic valve. Minimal  pulmonic regurgitation.  Pericardium/PleuraNormal pericardium with no pericardial  effusion.  ------------------------------------------------------------------------  CONCLUSIONS:  1. Mitral annular calcification, otherwise normal mitral  valve. Minimal mitral regurgitation.  2. Calcified trileaflet aortic valve with normal opening.  Moderate aortic regurgitation.  Vena contracta width about  0.4 cm.  3. Mildly dilated left atrium.  LA volume index = 35 cc/m2.  4. Normal left ventricular internaldimensions and wall  thicknesses.  5. Normal left ventricular systolic function. No segmental  wall motion abnormalities.  6. Mild diastolic dysfunction (Stage I).  7. Normal right ventricular size and function.      EKG: Normal Sinus Rhythm

## 2021-06-03 NOTE — DISCHARGE NOTE PROVIDER - NSDCMRMEDTOKEN_GEN_ALL_CORE_FT
apixaban 5 mg oral tablet: 1 tab(s) orally every 12 hours  Breo Ellipta 200 mcg-25 mcg/inh inhalation powder: 1 puff(s) inhaled once a day  Crestor 5 mg oral tablet: 1 tab(s) orally once a day (at bedtime)  Daliresp 250 mcg oral tablet: 1 tab(s) orally once a day  dilTIAZem 30 mg oral tablet: 2 tab(s) orally every 12 hours  Dupixent 300 mg/2 mL subcutaneous solution: 1  subcutaneous every 2 weeks  HumaLOG 100 units/mL subcutaneous solution: Sliding Scale  ipratropium-albuterol 0.5 mg-2.5 mg/3 mLinhalation solution: 3 milliliter(s) inhaled every 6 hours  Lantus 100 units/mL subcutaneous solution: 15 unit(s) subcutaneous once (at bedtime)  methylPREDNISolone 4 mg oral tablet: 1 tab(s) orally once a day  mexiletine 200 mg oral capsule: 1 cap(s) orally 3 times a day  MiraLax oral powder for reconstitution: 17 gram(s) orally once a day  Protonix 40 mg oral delayed release tablet: 1 tab(s) orally once a day - 1/2 hour before breakfast  Senna 8.6 mg oral tablet: 1 tab(s) orally once a day (at bedtime)  Singulair 10 mg oral tablet: 1 tab(s) orally once a day  Spiriva 18 mcg inhalation capsule: 1 cap(s) inhaled once a day  Victoza 18 mg/3 mL subcutaneous solution: 1.8 milligram(s) subcutaneous once a day  Zofran 8 mg oral tablet: 1 tab(s) orally 3 times a day, As Needed

## 2021-06-03 NOTE — DISCHARGE NOTE PROVIDER - HOSPITAL COURSE
70F with history of Tracheobronchomalasia s/p Tracheobronchoplasty, Bronchiectasis, Severe Allergic Asthma, Adrenal Insuffiencey, Port, DM2, HTN, Colorectal cancer s/p partial colectomy now with colostomy, PAF on Eliquis. Patient was recently hospitalized at [facility] 4/20-4/29 for palpitations/SOB and was found to have PVCs/bigeminy. Presents today to the hospital with complaints of palpitations with associated SOB and lightheadedness earlier in day.       Palpitations.    - Likely had episode of bigeminy vs PVCs vs other arrhythmia at home today  - No episodes here  - Would c/w telemetry monitoring  - c/w mexiletine  - EP eval in AM, ?loop recorder.     Tracheobronchomalacia.    - On dupixent, clarify dosing schedule in AM and dose as needed  - Has chronic cough/sputum production without change, no wheezing or SOB currently, lungs clear to auscultation, no suspicion for exacerbation at present.     Severe persistent asthma without complication.    - c/w breo ellipta (with therapeutic interchange), duonebs prn, spiriva, daliresp, singulair.     Adrenal insufficiency.    - c/w home methylprednisolone, pantoprazole.     Paroxysmal atrial fibrillation.    - c/w home apixaban, diltiazem.     Type 2 diabetes mellitus without complication, with long-term current use of insulin  - c/w home lantus (decrease to 80% of home dose), c/w ISS, FS qAC, diabetic diet  - c/w statin therapy with therapeutic interchange.    DVT ppx - on eliquis     70F with history of Tracheobronchomalasia s/p Tracheobronchoplasty, Bronchiectasis, Severe Allergic Asthma, Adrenal Insuffiencey, Port, DM2, HTN, Colorectal cancer s/p partial colectomy now with colostomy, PAF on Eliquis. Patient was recently hospitalized at [facility] 4/20-4/29 for palpitations/SOB and was found to have PVCs/bigeminy. Presents today to the hospital with complaints of palpitations with associated SOB and lightheadedness earlier in day.       Palpitations.    - Likely had episode of bigeminy vs PVCs vs other arrhythmia at home today  - No episodes here  - c/w mexiletine      Tracheobronchomalacia.    - On dupixent every 2 weeks   - Has chronic cough/sputum production without change, no wheezing or SOB currently, lungs clear to auscultation, no suspicion for exacerbation at present.     Severe persistent asthma without complication.    - c/w breo ellipta (with therapeutic interchange), duonebs prn, spiriva, daliresp, singulair.     Adrenal insufficiency.    - c/w home methylprednisolone, pantoprazole.     Paroxysmal atrial fibrillation.    - c/w home apixaban, diltiazem.     Type 2 diabetes mellitus without complication, with long-term current use of insulin  - c/w home lantus (decrease to 80% of home dose), c/w ISS, FS qAC, diabetic diet  - c/w statin therapy with therapeutic interchange.        Discussed with attending ready for discharge home ?????????     70F with history of Tracheobronchomalasia s/p Tracheobronchoplasty, Bronchiectasis, Severe Allergic Asthma, Adrenal Insuffiencey, Port, DM2, HTN, Colorectal cancer s/p partial colectomy now with colostomy, PAF on Eliquis. Patient was recently hospitalized at [facility] 4/20-4/29 for palpitations/SOB and was found to have PVCs/bigeminy. Presents today to the hospital with complaints of palpitations with associated SOB and lightheadedness earlier in day.     Palpitations.    - Likely had episode of bigeminy vs PVCs vs other arrhythmia at home today  - c/w mexiletine    Tracheobronchomalacia  - On dupixent every 2 weeks   - Has chronic cough/sputum production without change, no wheezing or SOB currently, lungs clear to auscultation, no suspicion for exacerbation at present.     Severe persistent asthma without complication.    - c/w breo ellipta (with therapeutic interchange), duonebs prn, spiriva, daliresp, singulair.     Adrenal insufficiency.    - c/w home methylprednisolone, pantoprazole.     Paroxysmal atrial fibrillation.    - c/w home apixaban, diltiazem.     Type 2 diabetes mellitus without complication, with long-term current use of insulin  - c/w home lantus (decrease to 80% of home dose), c/w ISS, FS qAC, diabetic diet  - c/w statin therapy with therapeutic interchange.      Discussed with attending ready for discharge home 6/7 -

## 2021-06-03 NOTE — PROGRESS NOTE ADULT - SUBJECTIVE AND OBJECTIVE BOX
Cardiology/Vascular Medicine Inpatient Progress Note    No new complaints  Telemetry: sinus bradycardia to sinus rhythm, intermittent PVCs, 4b NSVT  BPs under acceptable control on current regimen    On my exam, patient appeared clinically and hemodynamically stable.  Patient current on mexiletine as per EP recommendations; anticipate no change in regimen but will defer to EP team.    No further cardiac testing needed at this time.  No objection to discharge from our perspective with f/u in the office within 2 weeks.    Vital Signs Last 24 Hrs  T(C): 36.6 (03 Jun 2021 05:49), Max: 37 (02 Jun 2021 21:32)  T(F): 97.8 (03 Jun 2021 05:49), Max: 98.6 (02 Jun 2021 21:32)  HR: 57 (03 Jun 2021 05:49) (57 - 68)  BP: 148/66 (03 Jun 2021 05:49) (136/54 - 150/64)  BP(mean): --  RR: 16 (03 Jun 2021 05:49) (16 - 17)  SpO2: 100% (03 Jun 2021 05:49) (96% - 100%)      Appearance: NAD  HEENT:   No JVD  Cardiovascular: Normal S1 S2  Respiratory: Lungs clear to auscultation	  Psychiatry: Awake, alert  Gastrointestinal:  Soft, Non-tender, + BS	  Neurologic: Non-focal  Extremities: No edema    MEDICATIONS  (STANDING):  apixaban 5 milliGRAM(s) Oral every 12 hours  atorvastatin 20 milliGRAM(s) Oral at bedtime  budesonide 160 MICROgram(s)/formoterol 4.5 MICROgram(s) Inhaler 2 Puff(s) Inhalation two times a day  chlorhexidine 2% Cloths 1 Application(s) Topical daily  dextrose 40% Gel 15 Gram(s) Oral once  dextrose 5%. 1000 milliLiter(s) (50 mL/Hr) IV Continuous <Continuous>  dextrose 5%. 1000 milliLiter(s) (100 mL/Hr) IV Continuous <Continuous>  dextrose 50% Injectable 25 Gram(s) IV Push once  dextrose 50% Injectable 12.5 Gram(s) IV Push once  dextrose 50% Injectable 25 Gram(s) IV Push once  diltiazem    Tablet 60 milliGRAM(s) Oral every 12 hours  glucagon  Injectable 1 milliGRAM(s) IntraMuscular once  insulin glargine Injectable (LANTUS) 12 Unit(s) SubCutaneous at bedtime  insulin lispro (ADMELOG) corrective regimen sliding scale   SubCutaneous three times a day before meals  insulin lispro (ADMELOG) corrective regimen sliding scale   SubCutaneous at bedtime  methylPREDNISolone 4 milliGRAM(s) Oral daily  mexiletine 200 milliGRAM(s) Oral three times a day  montelukast 10 milliGRAM(s) Oral daily  pantoprazole    Tablet 40 milliGRAM(s) Oral before breakfast  roflumilast 250 MICROGram(s) Oral daily  senna 2 Tablet(s) Oral at bedtime  tiotropium 18 MICROgram(s) Capsule 1 Capsule(s) Inhalation daily    MEDICATIONS  (PRN):  albuterol/ipratropium for Nebulization 3 milliLiter(s) Nebulizer every 6 hours PRN Shortness of Breath and/or Wheezing  ondansetron    Tablet 4 milliGRAM(s) Oral three times a day PRN Nausea and/or Vomiting  polyethylene glycol 3350 17 Gram(s) Oral daily PRN Constipation    LABS:	 	                           12.1   6.31  )-----------( 273      ( 03 Jun 2021 07:36 )             39.7     06-03    142  |  105  |  15  ----------------------------<  136<H>  3.7   |  26  |  0.73    Ca    9.0      03 Jun 2021 07:36  Phos  3.6     06-03  Mg     2.2     06-03    TPro  6.2  /  Alb  3.6  /  TBili  0.2  /  DBili  x   /  AST  20  /  ALT  19  /  AlkPhos  86  06-02        < from: Transthoracic Echocardiogram (04.23.21 @ 09:26) >    Patient name: RAYRAY RODRIGUEZ  YOB: 1950   Age: 70 (F)   MR#: [id number]  Study Date: 4/23/2021  [facility]  Study quality: Technically good  Referring Physician: Ronn Naryaanan MD  Blood Pressure: 148/52 mmHg  Height: 170 cm  Weight: 64 kg  BSA: 1.8 m2  ------------------------------------------------------------------------  PROCEDURE: Transthoracic echocardiogram with 2-D, M-Mode  and complete spectral and color flow Doppler.  INDICATION: Palpitations (R00.2)  ------------------------------------------------------------------------  DIMENSIONS:  Dimensions:     Normal Values:  LA:     4.1 cm    2.0 - 4.0 cm  Ao:     3.3 cm    2.0 - 3.8 cm  SEPTUM: 0.7 cm    0.6 - 1.2 cm  PWT:    0.7 cm0.6 - 1.1 cm  LVIDd:  4.0 cm    3.0 - 5.6 cm  LVIDs:  2.7 cm    1.8 - 4.0 cm  Derived Variables:  LVMI: 45 g/m2  RWT: 0.35  Fractional short: 33 %  Ejection Fraction (Teicholtz): 61 %  ------------------------------------------------------------------------  OBSERVATIONS:  Mitral Valve: Mitral annular calcification, otherwise  normal mitral valve. Minimal mitral regurgitation.  Aortic Root: Normal aortic root.  Aortic Valve: Calcified trileaflet aortic valve with normal  opening. Moderate aortic regurgitation.  Vena contracta  width about  0.4 cm.  Left Atrium: Mildly dilated left atrium.  LA volume index =  35 cc/m2.  Left Ventricle: Normal left ventricular systolic function.  No segmental wall motion abnormalities. Normal left  ventricular internal dimensions and wall thicknesses. Mild  diastolic dysfunction (Stage I).  Right Heart: Normal right atrium. Normal right ventricular  size and function. Normal tricuspid valve. Minimal  tricuspid regurgitation. Normal pulmonic valve. Minimal  pulmonic regurgitation.  Pericardium/PleuraNormal pericardium with no pericardial  effusion.  ------------------------------------------------------------------------  CONCLUSIONS:  1. Mitral annular calcification, otherwise normal mitral  valve. Minimal mitral regurgitation.  2. Calcified trileaflet aortic valve with normal opening.  Moderate aortic regurgitation.  Vena contracta width about  0.4 cm.  3. Mildly dilated left atrium.  LA volume index = 35 cc/m2.  4. Normal left ventricular internaldimensions and wall  thicknesses.  5. Normal left ventricular systolic function. No segmental  wall motion abnormalities.  6. Mild diastolic dysfunction (Stage I).  7. Normal right ventricular size and function.  ------------------------------------------------------------------------  Confirmed on  4/23/2021 - 10:42:27 by Sergio Arizmendi M.D.,  [facility], [place]  ------------------------------------------------------------------------    < end of copied text >        "Thank you for the opportunity to participate in the care of this patient."      KATALINA GUERRA MD; Attending Cardiologist  This document has been electronically signed. Jan 12 2021  2:13PM    < end of copied text >  < from: Xray Chest 2 Views PA/Lat (06.01.21 @ 18:41) >  EXAM:  XR CHEST PA LAT 2V        PROCEDURE DATE:  Jun 1 2021         INTERPRETATION:  CLINICAL INDICATION: Chest pain.    TECHNIQUE: PA and lateral views of the chest.    COMPARISON: Chest radiograph 4/20/2021    FINDINGS:    Right chest wall infusion port with catheter tip in the SVC.  Cardiac size is within normal limits.  The lungs are clear.  There are no pleural effusions. No pneumothorax.  Degenerative changes of the thoracic spine.    IMPRESSION:  Clear lungs.    CECILIA BELCHER MD; Resident Radiology  This document has been electronically signed.  SAHIL ADKINS MD; Attending Radiologist  This document has been electronically signed. Jun 2 2021  8:12AM    < end of copied text >  < from: MR Cardiac No Cont (01.04.21 @ 22:27) >    EXAM:  MR CARD MORPH FUNCTION                          *** ADDENDUM 02/09/2021  ***    RVEF 56%  RVEDV- 117ml  RVESV- 51ml  SV: 66ml    RVEDVi- 68 ml/m2  RVESVi- 30ml/m2  SVi- 38ml/m2     Correction to report findings:    Mild to Moderate Aortic regurgitation based  on regurgitation volume 25ml  and Regurgitation fraction of 38%.      *** END OF ADDENDUM 02/09/2021  ***      PROCEDURE DATE:  01/04/2021          INTERPRETATION:  I. Clinical Information: 71 yo F with Aortic Insufficiency    II. Technique: Comprehensive Cardiac MRI examination was performed on a 1.5 T scanner using standard cardiac coil, cardiac gating, and standard pulse sequences for the assessment of cardiac morphology, function, and viability.  .    Ht: 170cm  Wt: 62kg  BSA: 1.71    Contrast:  mL of Multihance / Gadavist    III. Comparison: No images available for comparison.    IV. Findings    A. Cardiac morphology:    1. Left ventricle  a. Global systolic function: Global hypokinesis  b. Cavity size: Normal  c. Wall thickness: Normal    2. Right ventricle  a. Global systolic function: Normal  b. Cavity size: Normal    3. Aortic valve  a. Leaflets:  Calcified  b. Aortic Regurgitation: Moderate to severe AI  c. Aortic stenosis: Absent    4. Mitral valve  a. Leaflets: Normal  b. Leaflet mobility: Normal  c. Mitral regurgitation: Absent  d. Mitral stenosis: Absent    5. Tricuspid valve  a. Leaflets: Normal  b. Leaflet mobility: Normal  c. Tricuspid regurgitation: Absent  d. Tricuspid stenosis: Absent    6.Pulmonic valve  a. Leaflets: Normal  b. PI: Absent  c. PS: Absent    7. Left atrium  a. Size: Normal  b. Left atrial volume: 18ml  c. Left atrial volume index (Biplane method): 10mL/m2    8. Right atrium  a. Size:  Normal  b. Right atrial area: 14cm2    9. Pericardium  a.Effusion: None    10. Aorta  No significant abnormalities in the visualized portions of the thoracic aorta    11. Pulmonary artery  No significant abnormalities in the visualized portions of the pulmonary artery    B. Ventricular volume measurements    LVEF: 72%  LVEDV: 110mL  LVESV:30mL  SV: 80mL    LVEDVi:  64mL/m2  LVESVi: 18 mL/2  LVSVi: 47 mL/m2        C.Myocardial wall motion by regions (using 17 segment model):    1.Base  a. Anterior:  Normal  b. Anteroseptal: Normal  c. Anterolateral: Normal  d. Inferior: Normal  e. Inferoseptal:  Normal  f. Inferolateral:  Normal    2.  Mid  a. Anterior:  Normal  b. Anteroseptal:  Normal  c. Anterolateral:  Normal  d. Inferior:  Normal  e. Inferoseptal:  Normal  f. Inferolateral:  Normal    3.Apical  a. Anterior: Normal  b. Septal:    Normal  c. Inferior:    Normal  d. Lateral:    Normal    4.True [place]: Normal        E. Q-flow analysis (stenosis/regurgitation/shunt):    Qp:  Stroke volume:38 ml  Forward flow: 38ml  Backward flow: 0 ml  Regurgitant fraction: 0%    Qs:  Stroke volume: 41ml  Forward flow: 65ml  Backward flow: 25ml  Regurgitant fraction: 38%    Qp/Qs:0.92        INTERPRETATION:  1.  The left ventricle (LV) is normal in size. There is no evidence of LV hypertrophy. Left ventricular global systolic function is reduced The LV ejection fraction is  72 %.  2.  There is Moderate to Severe aortic insufficiency  3.  The right ventricle (RV) is normal in size. RV global systolic function is Normal  4.  No significant abnormalities of the visualized portions of the great vessels.      The sequences used in this study were designed for imaging cardiac structures and are suboptimal for imaging other structures and organs    ***Please see the addendum at the top of this report. It may contain additional important information or changes.****      Thank you for the opportunity to participate in the care of this patient.      MINISTERIO ROWLAND MD; Attending Cardiologist  This document has been electronically signed. Jan 52021  4:31PM  Addend:MINISTERIO ROWLAND MD; Attending Cardiologist  This addendum was electronically signed on: Feb 9 2021  6:39PM.    < end of copied text >

## 2021-06-03 NOTE — DISCHARGE NOTE PROVIDER - NSDCCPCAREPLAN_GEN_ALL_CORE_FT
PRINCIPAL DISCHARGE DIAGNOSIS  Diagnosis: Palpitations  Assessment and Plan of Treatment: You were evaluated by the electrophysiology team  Continue mexitil as directed      SECONDARY DISCHARGE DIAGNOSES  Diagnosis: Tracheobronchomalacia  Assessment and Plan of Treatment: Continue medications as directed   Follow up with your pulmonologist    Diagnosis: Severe persistent asthma without complication  Assessment and Plan of Treatment: Continue medications as directed  Followup withyour PCP and or pulmonologist    Diagnosis: Adrenal insufficiency  Assessment and Plan of Treatment: Continue medications as directed    Diagnosis: Type 2 diabetes mellitus without complication, with long-term current use of insulin  Assessment and Plan of Treatment: Continue consistent carbohydrate diet.  Monitor blood glucose levels throughout the day before meals and at bedtime.  Record blood sugars and bring to outpatient providers appointment in order to be reviewed by your doctor for management modifications.  Be aware of diabetes management symptoms including feeling cool and clammy may be related to low glucose levels.  Feeling hot and dry may indicate high glucose levels.  If  you feel these symptoms, check your blood sugar.  Make regular podiatry appointments in order to have feet checked for wounds and toe nails cut by a doctor to prevent infections.

## 2021-06-03 NOTE — DISCHARGE NOTE PROVIDER - CARE PROVIDERS DIRECT ADDRESSES
[email or web address]

## 2021-06-03 NOTE — CONSULT NOTE ADULT - REASON FOR ADMISSION
Palpitations with associated SOB and lightheadedness
Palpitations with associated SOB and lightheadedness

## 2021-06-04 DIAGNOSIS — I10 ESSENTIAL (PRIMARY) HYPERTENSION: ICD-10-CM

## 2021-06-04 LAB
ANION GAP SERPL CALC-SCNC: 12 MMOL/L — SIGNIFICANT CHANGE UP (ref 7–14)
BUN SERPL-MCNC: 13 MG/DL — SIGNIFICANT CHANGE UP (ref 7–23)
CALCIUM SERPL-MCNC: 8.6 MG/DL — SIGNIFICANT CHANGE UP (ref 8.4–10.5)
CHLORIDE SERPL-SCNC: 105 MMOL/L — SIGNIFICANT CHANGE UP (ref 98–107)
CO2 SERPL-SCNC: 25 MMOL/L — SIGNIFICANT CHANGE UP (ref 22–31)
CREAT SERPL-MCNC: 0.66 MG/DL — SIGNIFICANT CHANGE UP (ref 0.5–1.3)
GLUCOSE BLDC GLUCOMTR-MCNC: 135 MG/DL — HIGH (ref 70–99)
GLUCOSE BLDC GLUCOMTR-MCNC: 165 MG/DL — HIGH (ref 70–99)
GLUCOSE BLDC GLUCOMTR-MCNC: 210 MG/DL — HIGH (ref 70–99)
GLUCOSE BLDC GLUCOMTR-MCNC: 241 MG/DL — HIGH (ref 70–99)
GLUCOSE SERPL-MCNC: 107 MG/DL — HIGH (ref 70–99)
HCT VFR BLD CALC: 40.7 % — SIGNIFICANT CHANGE UP (ref 34.5–45)
HGB BLD-MCNC: 12 G/DL — SIGNIFICANT CHANGE UP (ref 11.5–15.5)
MAGNESIUM SERPL-MCNC: 2 MG/DL — SIGNIFICANT CHANGE UP (ref 1.6–2.6)
MCHC RBC-ENTMCNC: 22.5 PG — LOW (ref 27–34)
MCHC RBC-ENTMCNC: 29.5 GM/DL — LOW (ref 32–36)
MCV RBC AUTO: 76.2 FL — LOW (ref 80–100)
NRBC # BLD: 0 /100 WBCS — SIGNIFICANT CHANGE UP
NRBC # FLD: 0 K/UL — SIGNIFICANT CHANGE UP
PHOSPHATE SERPL-MCNC: 3.2 MG/DL — SIGNIFICANT CHANGE UP (ref 2.5–4.5)
PLATELET # BLD AUTO: 257 K/UL — SIGNIFICANT CHANGE UP (ref 150–400)
POTASSIUM SERPL-MCNC: 4.1 MMOL/L — SIGNIFICANT CHANGE UP (ref 3.5–5.3)
POTASSIUM SERPL-SCNC: 4.1 MMOL/L — SIGNIFICANT CHANGE UP (ref 3.5–5.3)
RBC # BLD: 5.34 M/UL — HIGH (ref 3.8–5.2)
RBC # FLD: 16.8 % — HIGH (ref 10.3–14.5)
SODIUM SERPL-SCNC: 142 MMOL/L — SIGNIFICANT CHANGE UP (ref 135–145)
WBC # BLD: 6.32 K/UL — SIGNIFICANT CHANGE UP (ref 3.8–10.5)
WBC # FLD AUTO: 6.32 K/UL — SIGNIFICANT CHANGE UP (ref 3.8–10.5)

## 2021-06-04 PROCEDURE — 99233 SBSQ HOSP IP/OBS HIGH 50: CPT

## 2021-06-04 PROCEDURE — 99232 SBSQ HOSP IP/OBS MODERATE 35: CPT

## 2021-06-04 RX ORDER — TRAMADOL HYDROCHLORIDE 50 MG/1
25 TABLET ORAL ONCE
Refills: 0 | Status: DISCONTINUED | OUTPATIENT
Start: 2021-06-04 | End: 2021-06-04

## 2021-06-04 RX ORDER — ONDANSETRON 8 MG/1
4 TABLET, FILM COATED ORAL ONCE
Refills: 0 | Status: COMPLETED | OUTPATIENT
Start: 2021-06-04 | End: 2021-06-04

## 2021-06-04 RX ADMIN — ONDANSETRON 4 MILLIGRAM(S): 8 TABLET, FILM COATED ORAL at 21:33

## 2021-06-04 RX ADMIN — TIOTROPIUM BROMIDE 1 CAPSULE(S): 18 CAPSULE ORAL; RESPIRATORY (INHALATION) at 09:32

## 2021-06-04 RX ADMIN — PANTOPRAZOLE SODIUM 40 MILLIGRAM(S): 20 TABLET, DELAYED RELEASE ORAL at 05:34

## 2021-06-04 RX ADMIN — MONTELUKAST 10 MILLIGRAM(S): 4 TABLET, CHEWABLE ORAL at 09:31

## 2021-06-04 RX ADMIN — INSULIN GLARGINE 12 UNIT(S): 100 INJECTION, SOLUTION SUBCUTANEOUS at 21:25

## 2021-06-04 RX ADMIN — MEXILETINE HYDROCHLORIDE 200 MILLIGRAM(S): 150 CAPSULE ORAL at 21:24

## 2021-06-04 RX ADMIN — POLYETHYLENE GLYCOL 3350 17 GRAM(S): 17 POWDER, FOR SOLUTION ORAL at 17:05

## 2021-06-04 RX ADMIN — Medication 2: at 12:51

## 2021-06-04 RX ADMIN — CHLORHEXIDINE GLUCONATE 1 APPLICATION(S): 213 SOLUTION TOPICAL at 14:13

## 2021-06-04 RX ADMIN — APIXABAN 5 MILLIGRAM(S): 2.5 TABLET, FILM COATED ORAL at 05:34

## 2021-06-04 RX ADMIN — Medication 60 MILLIGRAM(S): at 09:31

## 2021-06-04 RX ADMIN — Medication 60 MILLIGRAM(S): at 21:25

## 2021-06-04 RX ADMIN — SENNA PLUS 2 TABLET(S): 8.6 TABLET ORAL at 21:25

## 2021-06-04 RX ADMIN — POLYETHYLENE GLYCOL 3350 17 GRAM(S): 17 POWDER, FOR SOLUTION ORAL at 05:34

## 2021-06-04 RX ADMIN — MEXILETINE HYDROCHLORIDE 200 MILLIGRAM(S): 150 CAPSULE ORAL at 05:34

## 2021-06-04 RX ADMIN — APIXABAN 5 MILLIGRAM(S): 2.5 TABLET, FILM COATED ORAL at 17:05

## 2021-06-04 RX ADMIN — MEXILETINE HYDROCHLORIDE 200 MILLIGRAM(S): 150 CAPSULE ORAL at 14:11

## 2021-06-04 RX ADMIN — ONDANSETRON 4 MILLIGRAM(S): 8 TABLET, FILM COATED ORAL at 20:56

## 2021-06-04 RX ADMIN — TRAMADOL HYDROCHLORIDE 25 MILLIGRAM(S): 50 TABLET ORAL at 22:20

## 2021-06-04 RX ADMIN — Medication 30 MILLILITER(S): at 22:57

## 2021-06-04 RX ADMIN — TRAMADOL HYDROCHLORIDE 25 MILLIGRAM(S): 50 TABLET ORAL at 21:27

## 2021-06-04 RX ADMIN — Medication 1: at 17:05

## 2021-06-04 RX ADMIN — ROFLUMILAST 250 MICROGRAM(S): 500 TABLET ORAL at 09:31

## 2021-06-04 RX ADMIN — BUDESONIDE AND FORMOTEROL FUMARATE DIHYDRATE 2 PUFF(S): 160; 4.5 AEROSOL RESPIRATORY (INHALATION) at 09:32

## 2021-06-04 RX ADMIN — Medication 4 MILLIGRAM(S): at 05:34

## 2021-06-04 RX ADMIN — BUDESONIDE AND FORMOTEROL FUMARATE DIHYDRATE 2 PUFF(S): 160; 4.5 AEROSOL RESPIRATORY (INHALATION) at 21:25

## 2021-06-04 RX ADMIN — ATORVASTATIN CALCIUM 20 MILLIGRAM(S): 80 TABLET, FILM COATED ORAL at 21:24

## 2021-06-04 NOTE — PROGRESS NOTE ADULT - SUBJECTIVE AND OBJECTIVE BOX
Cardiology/Vascular Medicine Inpatient Progress Note    No new complaints  Telemetry: sinus bradycardia to sinus rhythm, intermittent PVCs, NSVT  BPs under acceptable control on current regimen    On my exam, patient appeared clinically and hemodynamically stable.  Patient current on mexiletine as per EP recommendations    Patient waiting for renal artery US  No objection to discharge from our perspective with f/u in the office within 2 weeks.    Vital Signs Last 24 Hrs  T(C): 36.6 (04 Jun 2021 11:54), Max: 37.1 (04 Jun 2021 09:30)  T(F): 97.8 (04 Jun 2021 11:54), Max: 98.7 (04 Jun 2021 09:30)  HR: 63 (04 Jun 2021 11:54) (58 - 77)  BP: 142/63 (04 Jun 2021 11:54) (123/68 - 150/58)  BP(mean): --  RR: 17 (04 Jun 2021 11:54) (16 - 17)  SpO2: 100% (04 Jun 2021 11:54) (98% - 100%)    Appearance: NAD  HEENT:   No JVD  Cardiovascular: Normal S1 S2  Respiratory: Lungs clear to auscultation	  Psychiatry: Awake, alert  Gastrointestinal:  Soft, Non-tender, + BS	  Neurologic: Non-focal  Extremities: No edema    MEDICATIONS  (STANDING):  apixaban 5 milliGRAM(s) Oral every 12 hours  atorvastatin 20 milliGRAM(s) Oral at bedtime  budesonide 160 MICROgram(s)/formoterol 4.5 MICROgram(s) Inhaler 2 Puff(s) Inhalation two times a day  chlorhexidine 2% Cloths 1 Application(s) Topical daily  dextrose 40% Gel 15 Gram(s) Oral once  dextrose 5%. 1000 milliLiter(s) (50 mL/Hr) IV Continuous <Continuous>  dextrose 5%. 1000 milliLiter(s) (100 mL/Hr) IV Continuous <Continuous>  dextrose 50% Injectable 25 Gram(s) IV Push once  dextrose 50% Injectable 12.5 Gram(s) IV Push once  dextrose 50% Injectable 25 Gram(s) IV Push once  diltiazem    Tablet 60 milliGRAM(s) Oral every 12 hours  glucagon  Injectable 1 milliGRAM(s) IntraMuscular once  insulin glargine Injectable (LANTUS) 12 Unit(s) SubCutaneous at bedtime  insulin lispro (ADMELOG) corrective regimen sliding scale   SubCutaneous three times a day before meals  insulin lispro (ADMELOG) corrective regimen sliding scale   SubCutaneous at bedtime  methylPREDNISolone 4 milliGRAM(s) Oral daily  mexiletine 200 milliGRAM(s) Oral three times a day  montelukast 10 milliGRAM(s) Oral daily  ondansetron Injectable 4 milliGRAM(s) IV Push once  pantoprazole    Tablet 40 milliGRAM(s) Oral before breakfast  polyethylene glycol 3350 17 Gram(s) Oral two times a day  roflumilast 250 MICROGram(s) Oral daily  senna 2 Tablet(s) Oral at bedtime  tiotropium 18 MICROgram(s) Capsule 1 Capsule(s) Inhalation daily  traMADol 25 milliGRAM(s) Oral once    MEDICATIONS  (PRN):  albuterol/ipratropium for Nebulization 3 milliLiter(s) Nebulizer every 6 hours PRN Shortness of Breath and/or Wheezing  ondansetron    Tablet 4 milliGRAM(s) Oral three times a day PRN Nausea and/or Vomiting    LABS:	 	                          12.0   6.32  )-----------( 257      ( 04 Jun 2021 08:01 )             40.7     06-04    142  |  105  |  13  ----------------------------<  107<H>  4.1   |  25  |  0.66    Ca    8.6      04 Jun 2021 08:01  Phos  3.2     06-04  Mg     2.0     06-04        < from: Transthoracic Echocardiogram (04.23.21 @ 09:26) >    Patient name: RAYRAY RODRIGUEZ  YOB: 1950   Age: 70 (F)   MR#: [id number]  Study Date: 4/23/2021  [facility]  Study quality: Technically good  Referring Physician: Ronn Narayanan MD  Blood Pressure: 148/52 mmHg  Height: 170 cm  Weight: 64 kg  BSA: 1.8 m2  ------------------------------------------------------------------------  PROCEDURE: Transthoracic echocardiogram with 2-D, M-Mode  and complete spectral and color flow Doppler.  INDICATION: Palpitations (R00.2)  ------------------------------------------------------------------------  DIMENSIONS:  Dimensions:     Normal Values:  LA:     4.1 cm    2.0 - 4.0 cm  Ao:     3.3 cm    2.0 - 3.8 cm  SEPTUM: 0.7 cm    0.6 - 1.2 cm  PWT:    0.7 cm0.6 - 1.1 cm  LVIDd:  4.0 cm    3.0 - 5.6 cm  LVIDs:  2.7 cm    1.8 - 4.0 cm  Derived Variables:  LVMI: 45 g/m2  RWT: 0.35  Fractional short: 33 %  Ejection Fraction (Teicholtz): 61 %  ------------------------------------------------------------------------  OBSERVATIONS:  Mitral Valve: Mitral annular calcification, otherwise  normal mitral valve. Minimal mitral regurgitation.  Aortic Root: Normal aortic root.  Aortic Valve: Calcified trileaflet aortic valve with normal  opening. Moderate aortic regurgitation.  Vena contracta  width about  0.4 cm.  Left Atrium: Mildly dilated left atrium.  LA volume index =  35 cc/m2.  Left Ventricle: Normal left ventricular systolic function.  No segmental wall motion abnormalities. Normal left  ventricular internal dimensions and wall thicknesses. Mild  diastolic dysfunction (Stage I).  Right Heart: Normal right atrium. Normal right ventricular  size and function. Normal tricuspid valve. Minimal  tricuspid regurgitation. Normal pulmonic valve. Minimal  pulmonic regurgitation.  Pericardium/PleuraNormal pericardium with no pericardial  effusion.  ------------------------------------------------------------------------  CONCLUSIONS:  1. Mitral annular calcification, otherwise normal mitral  valve. Minimal mitral regurgitation.  2. Calcified trileaflet aortic valve with normal opening.  Moderate aortic regurgitation.  Vena contracta width about  0.4 cm.  3. Mildly dilated left atrium.  LA volume index = 35 cc/m2.  4. Normal left ventricular internaldimensions and wall  thicknesses.  5. Normal left ventricular systolic function. No segmental  wall motion abnormalities.  6. Mild diastolic dysfunction (Stage I).  7. Normal right ventricular size and function.  ------------------------------------------------------------------------  Confirmed on  4/23/2021 - 10:42:27 by Sergio Arizmendi M.D.,  [facility], [facility]  ------------------------------------------------------------------------    < end of copied text >        "Thank you for the opportunity to participate in the care of this patient."      KATALINA GUERRA MD; Attending Cardiologist  This document has been electronically signed. Jan 12 2021  2:13PM    < end of copied text >  < from: Xray Chest 2 Views PA/Lat (06.01.21 @ 18:41) >  EXAM:  XR CHEST PA LAT 2V        PROCEDURE DATE:  Jun 1 2021         INTERPRETATION:  CLINICAL INDICATION: Chest pain.    TECHNIQUE: PA and lateral views of the chest.    COMPARISON: Chest radiograph 4/20/2021    FINDINGS:    Right chest wall infusion port with catheter tip in the SVC.  Cardiac size is within normal limits.  The lungs are clear.  There are no pleural effusions. No pneumothorax.  Degenerative changes of the thoracic spine.    IMPRESSION:  Clear lungs.    CECILIA BELCHER MD; Resident Radiology  This document has been electronically signed.  SAHIL ADKINS MD; Attending Radiologist  This document has been electronically signed. Jun 2 2021  8:12AM    < end of copied text >  < from: MR Cardiac No Cont (01.04.21 @ 22:27) >    EXAM:  MR CARD MORPH FUNCTION                          *** ADDENDUM 02/09/2021  ***    RVEF 56%  RVEDV- 117ml  RVESV- 51ml  SV: 66ml    RVEDVi- 68 ml/m2  RVESVi- 30ml/m2  SVi- 38ml/m2     Correction to report findings:    Mild to Moderate Aortic regurgitation based  on regurgitation volume 25ml  and Regurgitation fraction of 38%.      *** END OF ADDENDUM 02/09/2021  ***      PROCEDURE DATE:  01/04/2021          INTERPRETATION:  I. Clinical Information: 71 yo F with Aortic Insufficiency    II. Technique: Comprehensive Cardiac MRI examination was performed on a 1.5 T scanner using standard cardiac coil, cardiac gating, and standard pulse sequences for the assessment of cardiac morphology, function, and viability.  .    Ht: 170cm  Wt: 62kg  BSA: 1.71    Contrast:  mL of Multihance / Gadavist    III. Comparison: No images available for comparison.    IV. Findings    A. Cardiac morphology:    1. Left ventricle  a. Global systolic function: Global hypokinesis  b. Cavity size: Normal  c. Wall thickness: Normal    2. Right ventricle  a. Global systolic function: Normal  b. Cavity size: Normal    3. Aortic valve  a. Leaflets:  Calcified  b. Aortic Regurgitation: Moderate to severe AI  c. Aortic stenosis: Absent    4. Mitral valve  a. Leaflets: Normal  b. Leaflet mobility: Normal  c. Mitral regurgitation: Absent  d. Mitral stenosis: Absent    5. Tricuspid valve  a. Leaflets: Normal  b. Leaflet mobility: Normal  c. Tricuspid regurgitation: Absent  d. Tricuspid stenosis: Absent    6.Pulmonic valve  a. Leaflets: Normal  b. PI: Absent  c. PS: Absent    7. Left atrium  a. Size: Normal  b. Left atrial volume: 18ml  c. Left atrial volume index (Biplane method): 10mL/m2    8. Right atrium  a. Size:  Normal  b. Right atrial area: 14cm2    9. Pericardium  a.Effusion: None    10. Aorta  No significant abnormalities in the visualized portions of the thoracic aorta    11. Pulmonary artery  No significant abnormalities in the visualized portions of the pulmonary artery    B. Ventricular volume measurements    LVEF: 72%  LVEDV: 110mL  LVESV:30mL  SV: 80mL    LVEDVi:  64mL/m2  LVESVi: 18 mL/2  LVSVi: 47 mL/m2        C.Myocardial wall motion by regions (using 17 segment model):    1.Base  a. Anterior:  Normal  b. Anteroseptal: Normal  c. Anterolateral: Normal  d. Inferior: Normal  e. Inferoseptal:  Normal  f. Inferolateral:  Normal    2.  Mid  a. Anterior:  Normal  b. Anteroseptal:  Normal  c. Anterolateral:  Normal  d. Inferior:  Normal  e. Inferoseptal:  Normal  f. Inferolateral:  Normal    3.Apical  a. Anterior: Normal  b. Septal:    Normal  c. Inferior:    Normal  d. Lateral:    Normal    4.True [place]: Normal        E. Q-flow analysis (stenosis/regurgitation/shunt):    Qp:  Stroke volume:38 ml  Forward flow: 38ml  Backward flow: 0 ml  Regurgitant fraction: 0%    Qs:  Stroke volume: 41ml  Forward flow: 65ml  Backward flow: 25ml  Regurgitant fraction: 38%    Qp/Qs:0.92        INTERPRETATION:  1.  The left ventricle (LV) is normal in size. There is no evidence of LV hypertrophy. Left ventricular global systolic function is reduced The LV ejection fraction is  72 %.  2.  There is Moderate to Severe aortic insufficiency  3.  The right ventricle (RV) is normal in size. RV global systolic function is Normal  4.  No significant abnormalities of the visualized portions of the great vessels.      The sequences used in this study were designed for imaging cardiac structures and are suboptimal for imaging other structures and organs    ***Please see the addendum at the top of this report. It may contain additional important information or changes.****      Thank you for the opportunity to participate in the care of this patient.      MINISTERIO ROWLAND MD; Attending Cardiologist  This document has been electronically signed. Jan 52021  4:31PM  Addend:MINISTERIO ROWLAND MD; Attending Cardiologist  This addendum was electronically signed on: Feb 9 2021  6:39PM.    < end of copied text >

## 2021-06-04 NOTE — PROGRESS NOTE ADULT - PROBLEM SELECTOR PLAN 8
no h/o HTN  BP fluctuates  will check renal artery dopplers  EP feels PVCs related to BP fluctuations

## 2021-06-04 NOTE — PROGRESS NOTE ADULT - SUBJECTIVE AND OBJECTIVE BOX
[facility]  Mira Claros MD  Pager [phone]    Patient is a 70y old  Female who presents with a chief complaint of Palpitations with associated SOB and lightheadedness     SUBJECTIVE / OVERNIGHT EVENTS: feeling well this AM; d/w pt that I spoke with Dr Aragon that he feels her PVCs are related to her BP so we will investigate that further; she agrees      MEDICATIONS  (STANDING):  apixaban 5 milliGRAM(s) Oral every 12 hours  atorvastatin 20 milliGRAM(s) Oral at bedtime  budesonide 160 MICROgram(s)/formoterol 4.5 MICROgram(s) Inhaler 2 Puff(s) Inhalation two times a day  chlorhexidine 2% Cloths 1 Application(s) Topical daily  dextrose 40% Gel 15 Gram(s) Oral once  dextrose 5%. 1000 milliLiter(s) (50 mL/Hr) IV Continuous <Continuous>  dextrose 5%. 1000 milliLiter(s) (100 mL/Hr) IV Continuous <Continuous>  dextrose 50% Injectable 25 Gram(s) IV Push once  dextrose 50% Injectable 12.5 Gram(s) IV Push once  dextrose 50% Injectable 25 Gram(s) IV Push once  diltiazem    Tablet 60 milliGRAM(s) Oral every 12 hours  glucagon  Injectable 1 milliGRAM(s) IntraMuscular once  insulin glargine Injectable (LANTUS) 12 Unit(s) SubCutaneous at bedtime  insulin lispro (ADMELOG) corrective regimen sliding scale   SubCutaneous three times a day before meals  insulin lispro (ADMELOG) corrective regimen sliding scale   SubCutaneous at bedtime  methylPREDNISolone 4 milliGRAM(s) Oral daily  mexiletine 200 milliGRAM(s) Oral three times a day  montelukast 10 milliGRAM(s) Oral daily  pantoprazole    Tablet 40 milliGRAM(s) Oral before breakfast  polyethylene glycol 3350 17 Gram(s) Oral two times a day  roflumilast 250 MICROGram(s) Oral daily  senna 2 Tablet(s) Oral at bedtime  tiotropium 18 MICROgram(s) Capsule 1 Capsule(s) Inhalation daily    MEDICATIONS  (PRN):  albuterol/ipratropium for Nebulization 3 milliLiter(s) Nebulizer every 6 hours PRN Shortness of Breath and/or Wheezing  ondansetron    Tablet 4 milliGRAM(s) Oral three times a day PRN Nausea and/or Vomiting      CAPILLARY BLOOD GLUCOSE  POCT Blood Glucose.: 135 mg/dL (04 Jun 2021 08:44)  POCT Blood Glucose.: 179 mg/dL (03 Jun 2021 22:00)  POCT Blood Glucose.: 184 mg/dL (03 Jun 2021 17:05)  POCT Blood Glucose.: 214 mg/dL (03 Jun 2021 12:28)            PHYSICAL EXAM:  Vital Signs Last 24 Hrs  T(F): 98.5 (04 Jun 2021 05:30), Max: 98.5 (03 Jun 2021 12:07)  HR: 58 (04 Jun 2021 05:30) (58 - 80)  BP: 150/58 (04 Jun 2021 05:30) (128/62 - 150/58)  RR: 16 (04 Jun 2021 05:30) (16 - 18)  SpO2: 100% (04 Jun 2021 05:30) (98% - 100%)    CONSTITUTIONAL: NAD  EYES: exophthalmus  RESPIRATORY: Normal respiratory effort; lungs are clear to auscultation bilaterally  CARDIOVASCULAR: Regular rate and rhythm; No lower extremity edema;   ABDOMEN: Nontender to palpation, normoactive bowel sounds  MUSCULOSKELETAL:  no joint swelling or tenderness to palpation  PSYCH:  affect appropriate  NEUROLOGY: no gross sensory deficits       LABS:                        12.0   6.32  )-----------( 257      ( 04 Jun 2021 08:01 )             40.7     06-04    142  |  105  |  13  ----------------------------<  107<H>  4.1   |  25  |  0.66    Ca    8.6      04 Jun 2021 08:01  Phos  3.2     06-04  Mg     2.0     06-04                   [facility]  Mira Claros MD  Pager [phone]    Patient is a 70y old  Female who presents with a chief complaint of Palpitations with associated SOB and lightheadedness     SUBJECTIVE / OVERNIGHT EVENTS: feeling well this AM; d/w pt that I spoke with Dr Aragon that he feels her PVCs are related to her BP so we will investigate that further; she agrees      MEDICATIONS  (STANDING):  apixaban 5 milliGRAM(s) Oral every 12 hours  atorvastatin 20 milliGRAM(s) Oral at bedtime  budesonide 160 MICROgram(s)/formoterol 4.5 MICROgram(s) Inhaler 2 Puff(s) Inhalation two times a day  chlorhexidine 2% Cloths 1 Application(s) Topical daily  dextrose 40% Gel 15 Gram(s) Oral once  dextrose 5%. 1000 milliLiter(s) (50 mL/Hr) IV Continuous <Continuous>  dextrose 5%. 1000 milliLiter(s) (100 mL/Hr) IV Continuous <Continuous>  dextrose 50% Injectable 25 Gram(s) IV Push once  dextrose 50% Injectable 12.5 Gram(s) IV Push once  dextrose 50% Injectable 25 Gram(s) IV Push once  diltiazem    Tablet 60 milliGRAM(s) Oral every 12 hours  glucagon  Injectable 1 milliGRAM(s) IntraMuscular once  insulin glargine Injectable (LANTUS) 12 Unit(s) SubCutaneous at bedtime  insulin lispro (ADMELOG) corrective regimen sliding scale   SubCutaneous three times a day before meals  insulin lispro (ADMELOG) corrective regimen sliding scale   SubCutaneous at bedtime  methylPREDNISolone 4 milliGRAM(s) Oral daily  mexiletine 200 milliGRAM(s) Oral three times a day  montelukast 10 milliGRAM(s) Oral daily  pantoprazole    Tablet 40 milliGRAM(s) Oral before breakfast  polyethylene glycol 3350 17 Gram(s) Oral two times a day  roflumilast 250 MICROGram(s) Oral daily  senna 2 Tablet(s) Oral at bedtime  tiotropium 18 MICROgram(s) Capsule 1 Capsule(s) Inhalation daily    MEDICATIONS  (PRN):  albuterol/ipratropium for Nebulization 3 milliLiter(s) Nebulizer every 6 hours PRN Shortness of Breath and/or Wheezing  ondansetron    Tablet 4 milliGRAM(s) Oral three times a day PRN Nausea and/or Vomiting      CAPILLARY BLOOD GLUCOSE  POCT Blood Glucose.: 135 mg/dL (04 Jun 2021 08:44)  POCT Blood Glucose.: 179 mg/dL (03 Jun 2021 22:00)  POCT Blood Glucose.: 184 mg/dL (03 Jun 2021 17:05)  POCT Blood Glucose.: 214 mg/dL (03 Jun 2021 12:28)            PHYSICAL EXAM:  Vital Signs Last 24 Hrs  T(F): 98.5 (04 Jun 2021 05:30), Max: 98.5 (03 Jun 2021 12:07)  HR: 58 (04 Jun 2021 05:30) (58 - 80)  BP: 150/58 (04 Jun 2021 05:30) (128/62 - 150/58)  RR: 16 (04 Jun 2021 05:30) (16 - 18)  SpO2: 100% (04 Jun 2021 05:30) (98% - 100%)    CONSTITUTIONAL: NAD  EYES: exophthalmus  RESPIRATORY: Normal respiratory effort; lungs are clear to auscultation bilaterally  CARDIOVASCULAR: Regular rate and rhythm; No lower extremity edema;   ABDOMEN: Nontender to palpation, normoactive bowel sounds +ostomy, small amount brown stool  MUSCULOSKELETAL:  no joint swelling or tenderness to palpation  PSYCH:  affect appropriate  NEUROLOGY: no gross sensory deficits       LABS:                        12.0   6.32  )-----------( 257      ( 04 Jun 2021 08:01 )             40.7     06-04    142  |  105  |  13  ----------------------------<  107<H>  4.1   |  25  |  0.66    Ca    8.6      04 Jun 2021 08:01  Phos  3.2     06-04  Mg     2.0     06-04

## 2021-06-04 NOTE — PROGRESS NOTE ADULT - SUBJECTIVE AND OBJECTIVE BOX
Interval History:  Patient resting comfortably in bed   Denies CP/SOB/palpitations/dizziness.  No acute events overnight.    Medications:  albuterol/ipratropium for Nebulization 3 milliLiter(s) Nebulizer every 6 hours PRN  apixaban 5 milliGRAM(s) Oral every 12 hours  atorvastatin 20 milliGRAM(s) Oral at bedtime  budesonide 160 MICROgram(s)/formoterol 4.5 MICROgram(s) Inhaler 2 Puff(s) Inhalation two times a day  chlorhexidine 2% Cloths 1 Application(s) Topical daily  dextrose 40% Gel 15 Gram(s) Oral once  dextrose 5%. 1000 milliLiter(s) IV Continuous <Continuous>  dextrose 5%. 1000 milliLiter(s) IV Continuous <Continuous>  dextrose 50% Injectable 25 Gram(s) IV Push once  dextrose 50% Injectable 12.5 Gram(s) IV Push once  dextrose 50% Injectable 25 Gram(s) IV Push once  diltiazem    Tablet 60 milliGRAM(s) Oral every 12 hours  glucagon  Injectable 1 milliGRAM(s) IntraMuscular once  insulin glargine Injectable (LANTUS) 12 Unit(s) SubCutaneous at bedtime  insulin lispro (ADMELOG) corrective regimen sliding scale   SubCutaneous three times a day before meals  insulin lispro (ADMELOG) corrective regimen sliding scale   SubCutaneous at bedtime  methylPREDNISolone 4 milliGRAM(s) Oral daily  mexiletine 200 milliGRAM(s) Oral three times a day  montelukast 10 milliGRAM(s) Oral daily  ondansetron    Tablet 4 milliGRAM(s) Oral three times a day PRN  pantoprazole    Tablet 40 milliGRAM(s) Oral before breakfast  polyethylene glycol 3350 17 Gram(s) Oral two times a day  roflumilast 250 MICROGram(s) Oral daily  senna 2 Tablet(s) Oral at bedtime  tiotropium 18 MICROgram(s) Capsule 1 Capsule(s) Inhalation daily      Vitals:  T(C): 36.6 (06-04-21 @ 11:54), Max: 37.1 (06-04-21 @ 09:30)  HR: 63 (06-04-21 @ 11:54) (58 - 80)  BP: 142/63 (06-04-21 @ 11:54) (123/68 - 150/58)  BP(mean): --  RR: 17 (06-04-21 @ 11:54) (16 - 17)  SpO2: 100% (06-04-21 @ 11:54) (98% - 100%)    Daily     Daily         I&O's Summary    03 Jun 2021 07:01  -  04 Jun 2021 07:00  --------------------------------------------------------  IN: 720 mL / OUT: 800 mL / NET: -80 mL    04 Jun 2021 07:01  -  04 Jun 2021 15:38  --------------------------------------------------------  IN: 240 mL / OUT: 0 mL / NET: 240 mL        Physical Exam:  Appearance: No Acute Distress  Cardiovascular: Normal S1 S2, No murmurs/rubs/gallops  Respiratory: Clear to auscultation bilaterally  Gastrointestinal: Soft, Non-tender	  Skin: No cyanosis	  Neurologic: Non-focal  Extremities: No LE edema  Psychiatry: A & O x 3, Mood & affect appropriate    Labs:                        12.0   6.32  )-----------( 257      ( 04 Jun 2021 08:01 )             40.7     06-04    142  |  105  |  13  ----------------------------<  107<H>  4.1   |  25  |  0.66    Ca    8.6      04 Jun 2021 08:01  Phos  3.2     06-04  Mg     2.0     06-04        TELEMETRY: Sinus Rhythm with occasional  PVCs

## 2021-06-05 LAB
ANION GAP SERPL CALC-SCNC: 12 MMOL/L — SIGNIFICANT CHANGE UP (ref 7–14)
BUN SERPL-MCNC: 14 MG/DL — SIGNIFICANT CHANGE UP (ref 7–23)
CALCIUM SERPL-MCNC: 8.9 MG/DL — SIGNIFICANT CHANGE UP (ref 8.4–10.5)
CHLORIDE SERPL-SCNC: 104 MMOL/L — SIGNIFICANT CHANGE UP (ref 98–107)
CO2 SERPL-SCNC: 25 MMOL/L — SIGNIFICANT CHANGE UP (ref 22–31)
CREAT SERPL-MCNC: 0.73 MG/DL — SIGNIFICANT CHANGE UP (ref 0.5–1.3)
GLUCOSE BLDC GLUCOMTR-MCNC: 138 MG/DL — HIGH (ref 70–99)
GLUCOSE BLDC GLUCOMTR-MCNC: 188 MG/DL — HIGH (ref 70–99)
GLUCOSE BLDC GLUCOMTR-MCNC: 226 MG/DL — HIGH (ref 70–99)
GLUCOSE BLDC GLUCOMTR-MCNC: 241 MG/DL — HIGH (ref 70–99)
GLUCOSE SERPL-MCNC: 127 MG/DL — HIGH (ref 70–99)
HCT VFR BLD CALC: 39.8 % — SIGNIFICANT CHANGE UP (ref 34.5–45)
HGB BLD-MCNC: 12.1 G/DL — SIGNIFICANT CHANGE UP (ref 11.5–15.5)
MAGNESIUM SERPL-MCNC: 2.1 MG/DL — SIGNIFICANT CHANGE UP (ref 1.6–2.6)
MCHC RBC-ENTMCNC: 22.8 PG — LOW (ref 27–34)
MCHC RBC-ENTMCNC: 30.4 GM/DL — LOW (ref 32–36)
MCV RBC AUTO: 75 FL — LOW (ref 80–100)
NRBC # BLD: 0 /100 WBCS — SIGNIFICANT CHANGE UP
NRBC # FLD: 0 K/UL — SIGNIFICANT CHANGE UP
PHOSPHATE SERPL-MCNC: 3.5 MG/DL — SIGNIFICANT CHANGE UP (ref 2.5–4.5)
PLATELET # BLD AUTO: 262 K/UL — SIGNIFICANT CHANGE UP (ref 150–400)
POTASSIUM SERPL-MCNC: 4 MMOL/L — SIGNIFICANT CHANGE UP (ref 3.5–5.3)
POTASSIUM SERPL-SCNC: 4 MMOL/L — SIGNIFICANT CHANGE UP (ref 3.5–5.3)
RBC # BLD: 5.31 M/UL — HIGH (ref 3.8–5.2)
RBC # FLD: 16.8 % — HIGH (ref 10.3–14.5)
SODIUM SERPL-SCNC: 141 MMOL/L — SIGNIFICANT CHANGE UP (ref 135–145)
TSH SERPL-MCNC: 2.56 UIU/ML — SIGNIFICANT CHANGE UP (ref 0.27–4.2)
WBC # BLD: 7.16 K/UL — SIGNIFICANT CHANGE UP (ref 3.8–10.5)
WBC # FLD AUTO: 7.16 K/UL — SIGNIFICANT CHANGE UP (ref 3.8–10.5)

## 2021-06-05 PROCEDURE — 99233 SBSQ HOSP IP/OBS HIGH 50: CPT

## 2021-06-05 PROCEDURE — 99232 SBSQ HOSP IP/OBS MODERATE 35: CPT

## 2021-06-05 RX ORDER — TRAMADOL HYDROCHLORIDE 50 MG/1
25 TABLET ORAL ONCE
Refills: 0 | Status: DISCONTINUED | OUTPATIENT
Start: 2021-06-05 | End: 2021-06-05

## 2021-06-05 RX ORDER — ACETAMINOPHEN 500 MG
650 TABLET ORAL EVERY 6 HOURS
Refills: 0 | Status: DISCONTINUED | OUTPATIENT
Start: 2021-06-05 | End: 2021-06-05

## 2021-06-05 RX ADMIN — BUDESONIDE AND FORMOTEROL FUMARATE DIHYDRATE 2 PUFF(S): 160; 4.5 AEROSOL RESPIRATORY (INHALATION) at 22:10

## 2021-06-05 RX ADMIN — Medication 60 MILLIGRAM(S): at 17:05

## 2021-06-05 RX ADMIN — TRAMADOL HYDROCHLORIDE 25 MILLIGRAM(S): 50 TABLET ORAL at 13:25

## 2021-06-05 RX ADMIN — Medication 2: at 12:48

## 2021-06-05 RX ADMIN — MONTELUKAST 10 MILLIGRAM(S): 4 TABLET, CHEWABLE ORAL at 08:47

## 2021-06-05 RX ADMIN — Medication 4 MILLIGRAM(S): at 05:11

## 2021-06-05 RX ADMIN — MEXILETINE HYDROCHLORIDE 200 MILLIGRAM(S): 150 CAPSULE ORAL at 22:11

## 2021-06-05 RX ADMIN — ROFLUMILAST 250 MICROGRAM(S): 500 TABLET ORAL at 09:35

## 2021-06-05 RX ADMIN — POLYETHYLENE GLYCOL 3350 17 GRAM(S): 17 POWDER, FOR SOLUTION ORAL at 17:25

## 2021-06-05 RX ADMIN — APIXABAN 5 MILLIGRAM(S): 2.5 TABLET, FILM COATED ORAL at 17:05

## 2021-06-05 RX ADMIN — MEXILETINE HYDROCHLORIDE 200 MILLIGRAM(S): 150 CAPSULE ORAL at 12:50

## 2021-06-05 RX ADMIN — APIXABAN 5 MILLIGRAM(S): 2.5 TABLET, FILM COATED ORAL at 05:11

## 2021-06-05 RX ADMIN — BUDESONIDE AND FORMOTEROL FUMARATE DIHYDRATE 2 PUFF(S): 160; 4.5 AEROSOL RESPIRATORY (INHALATION) at 08:47

## 2021-06-05 RX ADMIN — CHLORHEXIDINE GLUCONATE 1 APPLICATION(S): 213 SOLUTION TOPICAL at 08:48

## 2021-06-05 RX ADMIN — ATORVASTATIN CALCIUM 20 MILLIGRAM(S): 80 TABLET, FILM COATED ORAL at 22:11

## 2021-06-05 RX ADMIN — TRAMADOL HYDROCHLORIDE 25 MILLIGRAM(S): 50 TABLET ORAL at 14:21

## 2021-06-05 RX ADMIN — SENNA PLUS 2 TABLET(S): 8.6 TABLET ORAL at 22:11

## 2021-06-05 RX ADMIN — INSULIN GLARGINE 12 UNIT(S): 100 INJECTION, SOLUTION SUBCUTANEOUS at 22:10

## 2021-06-05 RX ADMIN — Medication 1: at 17:22

## 2021-06-05 RX ADMIN — TIOTROPIUM BROMIDE 1 CAPSULE(S): 18 CAPSULE ORAL; RESPIRATORY (INHALATION) at 08:51

## 2021-06-05 RX ADMIN — MEXILETINE HYDROCHLORIDE 200 MILLIGRAM(S): 150 CAPSULE ORAL at 05:11

## 2021-06-05 RX ADMIN — PANTOPRAZOLE SODIUM 40 MILLIGRAM(S): 20 TABLET, DELAYED RELEASE ORAL at 05:11

## 2021-06-05 RX ADMIN — Medication 60 MILLIGRAM(S): at 05:11

## 2021-06-05 NOTE — PROGRESS NOTE ADULT - SUBJECTIVE AND OBJECTIVE BOX
Cardiology/Vascular Medicine Inpatient Progress Note    No new complaints  Telemetry: sinus bradycardia to sinus rhythm, intermittent PVCs, NSVT  BPs under acceptable control on current regimen    On my exam, patient appeared clinically and hemodynamically stable.  Patient current on mexiletine as per EP recommendations    Patient waiting for renal artery US  No objection to discharge from our perspective with f/u in the office within 2 weeks.    Vital Signs Last 24 Hrs  T(C): 36.7 (05 Jun 2021 05:05), Max: 37.1 (04 Jun 2021 09:30)  T(F): 98 (05 Jun 2021 05:05), Max: 98.7 (04 Jun 2021 09:30)  HR: 62 (05 Jun 2021 05:05) (60 - 77)  BP: 150/64 (05 Jun 2021 05:05) (123/68 - 172/65)  BP(mean): --  RR: 16 (05 Jun 2021 05:05) (16 - 17)  SpO2: 100% (05 Jun 2021 05:05) (98% - 100%)    Appearance: NAD  HEENT:   No JVD  Cardiovascular: Normal S1 S2  Respiratory: Lungs clear to auscultation	  Psychiatry: Awake, alert  Gastrointestinal:  Soft, Non-tender, + BS	  Neurologic: Non-focal  Extremities: No edema    MEDICATIONS  (STANDING):  apixaban 5 milliGRAM(s) Oral every 12 hours  atorvastatin 20 milliGRAM(s) Oral at bedtime  budesonide 160 MICROgram(s)/formoterol 4.5 MICROgram(s) Inhaler 2 Puff(s) Inhalation two times a day  chlorhexidine 2% Cloths 1 Application(s) Topical daily  dextrose 40% Gel 15 Gram(s) Oral once  dextrose 5%. 1000 milliLiter(s) (50 mL/Hr) IV Continuous <Continuous>  dextrose 5%. 1000 milliLiter(s) (100 mL/Hr) IV Continuous <Continuous>  dextrose 50% Injectable 25 Gram(s) IV Push once  dextrose 50% Injectable 12.5 Gram(s) IV Push once  dextrose 50% Injectable 25 Gram(s) IV Push once  diltiazem    Tablet 60 milliGRAM(s) Oral every 12 hours  glucagon  Injectable 1 milliGRAM(s) IntraMuscular once  insulin glargine Injectable (LANTUS) 12 Unit(s) SubCutaneous at bedtime  insulin lispro (ADMELOG) corrective regimen sliding scale   SubCutaneous three times a day before meals  insulin lispro (ADMELOG) corrective regimen sliding scale   SubCutaneous at bedtime  methylPREDNISolone 4 milliGRAM(s) Oral daily  mexiletine 200 milliGRAM(s) Oral three times a day  montelukast 10 milliGRAM(s) Oral daily  pantoprazole    Tablet 40 milliGRAM(s) Oral before breakfast  polyethylene glycol 3350 17 Gram(s) Oral two times a day  roflumilast 250 MICROGram(s) Oral daily  senna 2 Tablet(s) Oral at bedtime  tiotropium 18 MICROgram(s) Capsule 1 Capsule(s) Inhalation daily    MEDICATIONS  (PRN):  albuterol/ipratropium for Nebulization 3 milliLiter(s) Nebulizer every 6 hours PRN Shortness of Breath and/or Wheezing  ondansetron    Tablet 4 milliGRAM(s) Oral three times a day PRN Nausea and/or Vomiting    LABS:	 	                                 12.1   7.16  )-----------( 262      ( 05 Jun 2021 07:34 )             39.8   06-04    142  |  105  |  13  ----------------------------<  107<H>  4.1   |  25  |  0.66    Ca    8.6      04 Jun 2021 08:01  Phos  3.2     06-04  Mg     2.0     06-04        < from: Transthoracic Echocardiogram (04.23.21 @ 09:26) >    Patient name: RAYRAY RODRIGUEZ  YOB: 1950   Age: 70 (F)   MR#: [id number]  Study Date: 4/23/2021  [facility] MEL[facility]SA  Study quality: Technically good  Referring Physician: Ronn Narayanan MD  Blood Pressure: 148/52 mmHg  Height: 170 cm  Weight: 64 kg  BSA: 1.8 m2  ------------------------------------------------------------------------  PROCEDURE: Transthoracic echocardiogram with 2-D, M-Mode  and complete spectral and color flow Doppler.  INDICATION: Palpitations (R00.2)  ------------------------------------------------------------------------  DIMENSIONS:  Dimensions:     Normal Values:  LA:     4.1 cm    2.0 - 4.0 cm  Ao:     3.3 cm    2.0 - 3.8 cm  SEPTUM: 0.7 cm    0.6 - 1.2 cm  PWT:    0.7 cm0.6 - 1.1 cm  LVIDd:  4.0 cm    3.0 - 5.6 cm  LVIDs:  2.7 cm    1.8 - 4.0 cm  Derived Variables:  LVMI: 45 g/m2  RWT: 0.35  Fractional short: 33 %  Ejection Fraction (Teicholtz): 61 %  ------------------------------------------------------------------------  OBSERVATIONS:  Mitral Valve: Mitral annular calcification, otherwise  normal mitral valve. Minimal mitral regurgitation.  Aortic Root: Normal aortic root.  Aortic Valve: Calcified trileaflet aortic valve with normal  opening. Moderate aortic regurgitation.  Vena contracta  width about  0.4 cm.  Left Atrium: Mildly dilated left atrium.  LA volume index =  35 cc/m2.  Left Ventricle: Normal left ventricular systolic function.  No segmental wall motion abnormalities. Normal left  ventricular internal dimensions and wall thicknesses. Mild  diastolic dysfunction (Stage I).  Right Heart: Normal right atrium. Normal right ventricular  size and function. Normal tricuspid valve. Minimal  tricuspid regurgitation. Normal pulmonic valve. Minimal  pulmonic regurgitation.  Pericardium/PleuraNormal pericardium with no pericardial  effusion.  ------------------------------------------------------------------------  CONCLUSIONS:  1. Mitral annular calcification, otherwise normal mitral  valve. Minimal mitral regurgitation.  2. Calcified trileaflet aortic valve with normal opening.  Moderate aortic regurgitation.  Vena contracta width about  0.4 cm.  3. Mildly dilated left atrium.  LA volume index = 35 cc/m2.  4. Normal left ventricular internaldimensions and wall  thicknesses.  5. Normal left ventricular systolic function. No segmental  wall motion abnormalities.  6. Mild diastolic dysfunction (Stage I).  7. Normal right ventricular size and function.  ------------------------------------------------------------------------  Confirmed on  4/23/2021 - 10:42:27 by Sergio Arizmendi M.D.,  [facility], [facility]  ------------------------------------------------------------------------    < end of copied text >        "Thank you for the opportunity to participate in the care of this patient."      KATALINA GUERRA MD; Attending Cardiologist  This document has been electronically signed. Jan 12 2021  2:13PM    < end of copied text >  < from: Xray Chest 2 Views PA/Lat (06.01.21 @ 18:41) >  EXAM:  XR CHEST PA LAT 2V        PROCEDURE DATE:  Jun 1 2021         INTERPRETATION:  CLINICAL INDICATION: Chest pain.    TECHNIQUE: PA and lateral views of the chest.    COMPARISON: Chest radiograph 4/20/2021    FINDINGS:    Right chest wall infusion port with catheter tip in the SVC.  Cardiac size is within normal limits.  The lungs are clear.  There are no pleural effusions. No pneumothorax.  Degenerative changes of the thoracic spine.    IMPRESSION:  Clear lungs.    CECILIA BELCHER MD; Resident Radiology  This document has been electronically signed.  SAHIL ADKINS MD; Attending Radiologist  This document has been electronically signed. Jun 2 2021  8:12AM    < end of copied text >  < from: MR Cardiac No Cont (01.04.21 @ 22:27) >    EXAM:  MR CARD MORPH FUNCTION                          *** ADDENDUM 02/09/2021  ***    RVEF 56%  RVEDV- 117ml  RVESV- 51ml  SV: 66ml    RVEDVi- 68 ml/m2  RVESVi- 30ml/m2  SVi- 38ml/m2     Correction to report findings:    Mild to Moderate Aortic regurgitation based  on regurgitation volume 25ml  and Regurgitation fraction of 38%.      *** END OF ADDENDUM 02/09/2021  ***      PROCEDURE DATE:  01/04/2021          INTERPRETATION:  I. Clinical Information: 71 yo F with Aortic Insufficiency    II. Technique: Comprehensive Cardiac MRI examination was performed on a 1.5 T scanner using standard cardiac coil, cardiac gating, and standard pulse sequences for the assessment of cardiac morphology, function, and viability.  .    Ht: 170cm  Wt: 62kg  BSA: 1.71    Contrast:  mL of Multihance / Gadavist    III. Comparison: No images available for comparison.    IV. Findings    A. Cardiac morphology:    1. Left ventricle  a. Global systolic function: Global hypokinesis  b. Cavity size: Normal  c. Wall thickness: Normal    2. Right ventricle  a. Global systolic function: Normal  b. Cavity size: Normal    3. Aortic valve  a. Leaflets:  Calcified  b. Aortic Regurgitation: Moderate to severe AI  c. Aortic stenosis: Absent    4. Mitral valve  a. Leaflets: Normal  b. Leaflet mobility: Normal  c. Mitral regurgitation: Absent  d. Mitral stenosis: Absent    5. Tricuspid valve  a. Leaflets: Normal  b. Leaflet mobility: Normal  c. Tricuspid regurgitation: Absent  d. Tricuspid stenosis: Absent    6.Pulmonic valve  a. Leaflets: Normal  b. PI: Absent  c. PS: Absent    7. Left atrium  a. Size: Normal  b. Left atrial volume: 18ml  c. Left atrial volume index (Biplane method): 10mL/m2    8. Right atrium  a. Size:  Normal  b. Right atrial area: 14cm2    9. Pericardium  a.Effusion: None    10. Aorta  No significant abnormalities in the visualized portions of the thoracic aorta    11. Pulmonary artery  No significant abnormalities in the visualized portions of the pulmonary artery    B. Ventricular volume measurements    LVEF: 72%  LVEDV: 110mL  LVESV:30mL  SV: 80mL    LVEDVi:  64mL/m2  LVESVi: 18 mL/2  LVSVi: 47 mL/m2        C.Myocardial wall motion by regions (using 17 segment model):    1.Base  a. Anterior:  Normal  b. Anteroseptal: Normal  c. Anterolateral: Normal  d. Inferior: Normal  e. Inferoseptal:  Normal  f. Inferolateral:  Normal    2.  Mid  a. Anterior:  Normal  b. Anteroseptal:  Normal  c. Anterolateral:  Normal  d. Inferior:  Normal  e. Inferoseptal:  Normal  f. Inferolateral:  Normal    3.Apical  a. Anterior: Normal  b. Septal:    Normal  c. Inferior:    Normal  d. Lateral:    Normal    4.True [place]: Normal        E. Q-flow analysis (stenosis/regurgitation/shunt):    Qp:  Stroke volume:38 ml  Forward flow: 38ml  Backward flow: 0 ml  Regurgitant fraction: 0%    Qs:  Stroke volume: 41ml  Forward flow: 65ml  Backward flow: 25ml  Regurgitant fraction: 38%    Qp/Qs:0.92        INTERPRETATION:  1.  The left ventricle (LV) is normal in size. There is no evidence of LV hypertrophy. Left ventricular global systolic function is reduced The LV ejection fraction is  72 %.  2.  There is Moderate to Severe aortic insufficiency  3.  The right ventricle (RV) is normal in size. RV global systolic function is Normal  4.  No significant abnormalities of the visualized portions of the great vessels.      The sequences used in this study were designed for imaging cardiac structures and are suboptimal for imaging other structures and organs    ***Please see the addendum at the top of this report. It may contain additional important information or changes.****      Thank you for the opportunity to participate in the care of this patient.      MINISTERIO ROWLAND MD; Attending Cardiologist  This document has been electronically signed. Jan 52021  4:31PM  Addend:MINISTERIO ROWLAND MD; Attending Cardiologist  This addendum was electronically signed on: Feb 9 2021  6:39PM.    < end of copied text >  	  	     Cardiology/Vascular Medicine Inpatient Progress Note    No new complaints  Telemetry: sinus bradycardia to sinus rhythm, intermittent PVCs, NSVT  BPs under acceptable control on current regimen    On my exam, patient appeared clinically and hemodynamically stable.  Patient current on mexiletine as per EP recommendations    Patient waiting for renal artery US -- this can be done as outpatient  No objection to discharge from our perspective with f/u in the office within 2 weeks.    Vital Signs Last 24 Hrs  T(C): 36.7 (05 Jun 2021 05:05), Max: 37.1 (04 Jun 2021 09:30)  T(F): 98 (05 Jun 2021 05:05), Max: 98.7 (04 Jun 2021 09:30)  HR: 62 (05 Jun 2021 05:05) (60 - 77)  BP: 150/64 (05 Jun 2021 05:05) (123/68 - 172/65)  BP(mean): --  RR: 16 (05 Jun 2021 05:05) (16 - 17)  SpO2: 100% (05 Jun 2021 05:05) (98% - 100%)    Appearance: NAD  HEENT:   No JVD  Cardiovascular: Normal S1 S2  Respiratory: Lungs clear to auscultation	  Psychiatry: Awake, alert  Gastrointestinal:  Soft, Non-tender, + BS	  Neurologic: Non-focal  Extremities: No edema    MEDICATIONS  (STANDING):  apixaban 5 milliGRAM(s) Oral every 12 hours  atorvastatin 20 milliGRAM(s) Oral at bedtime  budesonide 160 MICROgram(s)/formoterol 4.5 MICROgram(s) Inhaler 2 Puff(s) Inhalation two times a day  chlorhexidine 2% Cloths 1 Application(s) Topical daily  dextrose 40% Gel 15 Gram(s) Oral once  dextrose 5%. 1000 milliLiter(s) (50 mL/Hr) IV Continuous <Continuous>  dextrose 5%. 1000 milliLiter(s) (100 mL/Hr) IV Continuous <Continuous>  dextrose 50% Injectable 25 Gram(s) IV Push once  dextrose 50% Injectable 12.5 Gram(s) IV Push once  dextrose 50% Injectable 25 Gram(s) IV Push once  diltiazem    Tablet 60 milliGRAM(s) Oral every 12 hours  glucagon  Injectable 1 milliGRAM(s) IntraMuscular once  insulin glargine Injectable (LANTUS) 12 Unit(s) SubCutaneous at bedtime  insulin lispro (ADMELOG) corrective regimen sliding scale   SubCutaneous three times a day before meals  insulin lispro (ADMELOG) corrective regimen sliding scale   SubCutaneous at bedtime  methylPREDNISolone 4 milliGRAM(s) Oral daily  mexiletine 200 milliGRAM(s) Oral three times a day  montelukast 10 milliGRAM(s) Oral daily  pantoprazole    Tablet 40 milliGRAM(s) Oral before breakfast  polyethylene glycol 3350 17 Gram(s) Oral two times a day  roflumilast 250 MICROGram(s) Oral daily  senna 2 Tablet(s) Oral at bedtime  tiotropium 18 MICROgram(s) Capsule 1 Capsule(s) Inhalation daily    MEDICATIONS  (PRN):  albuterol/ipratropium for Nebulization 3 milliLiter(s) Nebulizer every 6 hours PRN Shortness of Breath and/or Wheezing  ondansetron    Tablet 4 milliGRAM(s) Oral three times a day PRN Nausea and/or Vomiting    LABS:	 	                                 12.1   7.16  )-----------( 262      ( 05 Jun 2021 07:34 )             39.8   06-04    142  |  105  |  13  ----------------------------<  107<H>  4.1   |  25  |  0.66    Ca    8.6      04 Jun 2021 08:01  Phos  3.2     06-04  Mg     2.0     06-04        < from: Transthoracic Echocardiogram (04.23.21 @ 09:26) >    Patient name: RAYRAY RODRIGUEZ  YOB: 1950   Age: 70 (F)   MR#: [id number]  Study Date: 4/23/2021  [facility]  Study quality: Technically good  Referring Physician: Ronn Narayanan MD  Blood Pressure: 148/52 mmHg  Height: 170 cm  Weight: 64 kg  BSA: 1.8 m2  ------------------------------------------------------------------------  PROCEDURE: Transthoracic echocardiogram with 2-D, M-Mode  and complete spectral and color flow Doppler.  INDICATION: Palpitations (R00.2)  ------------------------------------------------------------------------  DIMENSIONS:  Dimensions:     Normal Values:  LA:     4.1 cm    2.0 - 4.0 cm  Ao:     3.3 cm    2.0 - 3.8 cm  SEPTUM: 0.7 cm    0.6 - 1.2 cm  PWT:    0.7 cm0.6 - 1.1 cm  LVIDd:  4.0 cm    3.0 - 5.6 cm  LVIDs:  2.7 cm    1.8 - 4.0 cm  Derived Variables:  LVMI: 45 g/m2  RWT: 0.35  Fractional short: 33 %  Ejection Fraction (Teicholtz): 61 %  ------------------------------------------------------------------------  OBSERVATIONS:  Mitral Valve: Mitral annular calcification, otherwise  normal mitral valve. Minimal mitral regurgitation.  Aortic Root: Normal aortic root.  Aortic Valve: Calcified trileaflet aortic valve with normal  opening. Moderate aortic regurgitation.  Vena contracta  width about  0.4 cm.  Left Atrium: Mildly dilated left atrium.  LA volume index =  35 cc/m2.  Left Ventricle: Normal left ventricular systolic function.  No segmental wall motion abnormalities. Normal left  ventricular internal dimensions and wall thicknesses. Mild  diastolic dysfunction (Stage I).  Right Heart: Normal right atrium. Normal right ventricular  size and function. Normal tricuspid valve. Minimal  tricuspid regurgitation. Normal pulmonic valve. Minimal  pulmonic regurgitation.  Pericardium/PleuraNormal pericardium with no pericardial  effusion.  ------------------------------------------------------------------------  CONCLUSIONS:  1. Mitral annular calcification, otherwise normal mitral  valve. Minimal mitral regurgitation.  2. Calcified trileaflet aortic valve with normal opening.  Moderate aortic regurgitation.  Vena contracta width about  0.4 cm.  3. Mildly dilated left atrium.  LA volume index = 35 cc/m2.  4. Normal left ventricular internaldimensions and wall  thicknesses.  5. Normal left ventricular systolic function. No segmental  wall motion abnormalities.  6. Mild diastolic dysfunction (Stage I).  7. Normal right ventricular size and function.  ------------------------------------------------------------------------  Confirmed on  4/23/2021 - 10:42:27 by Sergio Arizmendi M.D.,  [facility], [place]  ------------------------------------------------------------------------    < end of copied text >        "Thank you for the opportunity to participate in the care of this patient."      KATALINA GUERRA MD; Attending Cardiologist  This document has been electronically signed. Jan 12 2021  2:13PM    < end of copied text >  < from: Xray Chest 2 Views PA/Lat (06.01.21 @ 18:41) >  EXAM:  XR CHEST PA LAT 2V        PROCEDURE DATE:  Jun 1 2021         INTERPRETATION:  CLINICAL INDICATION: Chest pain.    TECHNIQUE: PA and lateral views of the chest.    COMPARISON: Chest radiograph 4/20/2021    FINDINGS:    Right chest wall infusion port with catheter tip in the SVC.  Cardiac size is within normal limits.  The lungs are clear.  There are no pleural effusions. No pneumothorax.  Degenerative changes of the thoracic spine.    IMPRESSION:  Clear lungs.    CECILIA BELCHER MD; Resident Radiology  This document has been electronically signed.  SAHIL ADKINS MD; Attending Radiologist  This document has been electronically signed. Jun 2 2021  8:12AM    < end of copied text >  < from: MR Cardiac No Cont (01.04.21 @ 22:27) >    EXAM:  MR CARD MORPH FUNCTION                          *** ADDENDUM 02/09/2021  ***    RVEF 56%  RVEDV- 117ml  RVESV- 51ml  SV: 66ml    RVEDVi- 68 ml/m2  RVESVi- 30ml/m2  SVi- 38ml/m2     Correction to report findings:    Mild to Moderate Aortic regurgitation based  on regurgitation volume 25ml  and Regurgitation fraction of 38%.      *** END OF ADDENDUM 02/09/2021  ***      PROCEDURE DATE:  01/04/2021          INTERPRETATION:  I. Clinical Information: 69 yo F with Aortic Insufficiency    II. Technique: Comprehensive Cardiac MRI examination was performed on a 1.5 T scanner using standard cardiac coil, cardiac gating, and standard pulse sequences for the assessment of cardiac morphology, function, and viability.  .    Ht: 170cm  Wt: 62kg  BSA: 1.71    Contrast:  mL of Multihance / Gadavist    III. Comparison: No images available for comparison.    IV. Findings    A. Cardiac morphology:    1. Left ventricle  a. Global systolic function: Global hypokinesis  b. Cavity size: Normal  c. Wall thickness: Normal    2. Right ventricle  a. Global systolic function: Normal  b. Cavity size: Normal    3. Aortic valve  a. Leaflets:  Calcified  b. Aortic Regurgitation: Moderate to severe AI  c. Aortic stenosis: Absent    4. Mitral valve  a. Leaflets: Normal  b. Leaflet mobility: Normal  c. Mitral regurgitation: Absent  d. Mitral stenosis: Absent    5. Tricuspid valve  a. Leaflets: Normal  b. Leaflet mobility: Normal  c. Tricuspid regurgitation: Absent  d. Tricuspid stenosis: Absent    6.Pulmonic valve  a. Leaflets: Normal  b. PI: Absent  c. PS: Absent    7. Left atrium  a. Size: Normal  b. Left atrial volume: 18ml  c. Left atrial volume index (Biplane method): 10mL/m2    8. Right atrium  a. Size:  Normal  b. Right atrial area: 14cm2    9. Pericardium  a.Effusion: None    10. Aorta  No significant abnormalities in the visualized portions of the thoracic aorta    11. Pulmonary artery  No significant abnormalities in the visualized portions of the pulmonary artery    B. Ventricular volume measurements    LVEF: 72%  LVEDV: 110mL  LVESV:30mL  SV: 80mL    LVEDVi:  64mL/m2  LVESVi: 18 mL/2  LVSVi: 47 mL/m2        C.Myocardial wall motion by regions (using 17 segment model):    1.Base  a. Anterior:  Normal  b. Anteroseptal: Normal  c. Anterolateral: Normal  d. Inferior: Normal  e. Inferoseptal:  Normal  f. Inferolateral:  Normal    2.  Mid  a. Anterior:  Normal  b. Anteroseptal:  Normal  c. Anterolateral:  Normal  d. Inferior:  Normal  e. Inferoseptal:  Normal  f. Inferolateral:  Normal    3.Apical  a. Anterior: Normal  b. Septal:    Normal  c. Inferior:    Normal  d. Lateral:    Normal    4.True [place]: Normal        E. Q-flow analysis (stenosis/regurgitation/shunt):    Qp:  Stroke volume:38 ml  Forward flow: 38ml  Backward flow: 0 ml  Regurgitant fraction: 0%    Qs:  Stroke volume: 41ml  Forward flow: 65ml  Backward flow: 25ml  Regurgitant fraction: 38%    Qp/Qs:0.92        INTERPRETATION:  1.  The left ventricle (LV) is normal in size. There is no evidence of LV hypertrophy. Left ventricular global systolic function is reduced The LV ejection fraction is  72 %.  2.  There is Moderate to Severe aortic insufficiency  3.  The right ventricle (RV) is normal in size. RV global systolic function is Normal  4.  No significant abnormalities of the visualized portions of the great vessels.      The sequences used in this study were designed for imaging cardiac structures and are suboptimal for imaging other structures and organs    ***Please see the addendum at the top of this report. It may contain additional important information or changes.****      Thank you for the opportunity to participate in the care of this patient.      IMNISTERIO ROWLAND MD; Attending Cardiologist  This document has been electronically signed. Jan 52021  4:31PM  Addend:MINISTERIO ROWLAND MD; Attending Cardiologist  This addendum was electronically signed on: Feb 9 2021  6:39PM.    < end of copied text >

## 2021-06-05 NOTE — PROGRESS NOTE ADULT - SUBJECTIVE AND OBJECTIVE BOX
Patient is a 70y old  Female who presents with a chief complaint of Palpitations with associated SOB and lightheadedness (05 Jun 2021 07:45)    [facility] Hospitalist - Department of Medicine   Barrett Pace DO   Pager: [phone]  Cell: [phone]    SUBJECTIVE / OVERNIGHT EVENTS: Noted to have HTN 170s overnight. Pt currently denies any complaints this AM. She is concerned about her new high blood pressure. Denies any current CP, palpitations, shortness of breath. Sitting comfortably in bed.     MEDICATIONS  (STANDING):  apixaban 5 milliGRAM(s) Oral every 12 hours  atorvastatin 20 milliGRAM(s) Oral at bedtime  budesonide 160 MICROgram(s)/formoterol 4.5 MICROgram(s) Inhaler 2 Puff(s) Inhalation two times a day  chlorhexidine 2% Cloths 1 Application(s) Topical daily  dextrose 40% Gel 15 Gram(s) Oral once  dextrose 5%. 1000 milliLiter(s) (50 mL/Hr) IV Continuous <Continuous>  dextrose 5%. 1000 milliLiter(s) (100 mL/Hr) IV Continuous <Continuous>  dextrose 50% Injectable 25 Gram(s) IV Push once  dextrose 50% Injectable 12.5 Gram(s) IV Push once  dextrose 50% Injectable 25 Gram(s) IV Push once  diltiazem    Tablet 60 milliGRAM(s) Oral every 12 hours  glucagon  Injectable 1 milliGRAM(s) IntraMuscular once  insulin glargine Injectable (LANTUS) 12 Unit(s) SubCutaneous at bedtime  insulin lispro (ADMELOG) corrective regimen sliding scale   SubCutaneous three times a day before meals  insulin lispro (ADMELOG) corrective regimen sliding scale   SubCutaneous at bedtime  methylPREDNISolone 4 milliGRAM(s) Oral daily  mexiletine 200 milliGRAM(s) Oral three times a day  montelukast 10 milliGRAM(s) Oral daily  pantoprazole    Tablet 40 milliGRAM(s) Oral before breakfast  polyethylene glycol 3350 17 Gram(s) Oral two times a day  roflumilast 250 MICROGram(s) Oral daily  senna 2 Tablet(s) Oral at bedtime  tiotropium 18 MICROgram(s) Capsule 1 Capsule(s) Inhalation daily    MEDICATIONS  (PRN):  albuterol/ipratropium for Nebulization 3 milliLiter(s) Nebulizer every 6 hours PRN Shortness of Breath and/or Wheezing  ondansetron    Tablet 4 milliGRAM(s) Oral three times a day PRN Nausea and/or Vomiting      Vital Signs Last 24 Hrs  T(C): 36.6 (05 Jun 2021 13:39), Max: 36.7 (04 Jun 2021 21:10)  T(F): 97.8 (05 Jun 2021 13:39), Max: 98 (04 Jun 2021 21:10)  HR: 61 (05 Jun 2021 13:39) (60 - 74)  BP: 126/54 (05 Jun 2021 13:39) (126/54 - 172/65)  BP(mean): --  RR: 18 (05 Jun 2021 13:39) (16 - 18)  SpO2: 100% (05 Jun 2021 13:39) (100% - 100%)  CAPILLARY BLOOD GLUCOSE      POCT Blood Glucose.: 241 mg/dL (05 Jun 2021 12:39)  POCT Blood Glucose.: 138 mg/dL (05 Jun 2021 08:46)  POCT Blood Glucose.: 210 mg/dL (04 Jun 2021 21:19)  POCT Blood Glucose.: 165 mg/dL (04 Jun 2021 17:04)      PHYSICAL EXAM:  GENERAL: NAD, well-developed  HEAD:  Atraumatic, Normocephalic  EYES: exophthalmos   NECK: Supple, No JVD  CHEST/LUNG: Clear to auscultation bilaterally; No wheeze  HEART: Regular rate and rhythm; No murmurs, rubs, or gallops  ABDOMEN: Soft, Nontender, Nondistended; Bowel sounds present. +ostomy   EXTREMITIES:  2+ Peripheral Pulses, No clubbing, cyanosis, or edema  PSYCH: AAOx3  NEUROLOGY: non-focal  SKIN: No rashes or lesions    LABS:                        12.1   7.16  )-----------( 262      ( 05 Jun 2021 07:34 )             39.8     06-05    141  |  104  |  14  ----------------------------<  127<H>  4.0   |  25  |  0.73    Ca    8.9      05 Jun 2021 07:34  Phos  3.5     06-05  Mg     2.1     06-05                RADIOLOGY & ADDITIONAL TESTS:

## 2021-06-06 LAB
ANION GAP SERPL CALC-SCNC: 10 MMOL/L — SIGNIFICANT CHANGE UP (ref 7–14)
BUN SERPL-MCNC: 18 MG/DL — SIGNIFICANT CHANGE UP (ref 7–23)
CALCIUM SERPL-MCNC: 8.9 MG/DL — SIGNIFICANT CHANGE UP (ref 8.4–10.5)
CHLORIDE SERPL-SCNC: 105 MMOL/L — SIGNIFICANT CHANGE UP (ref 98–107)
CO2 SERPL-SCNC: 25 MMOL/L — SIGNIFICANT CHANGE UP (ref 22–31)
CREAT SERPL-MCNC: 0.8 MG/DL — SIGNIFICANT CHANGE UP (ref 0.5–1.3)
GLUCOSE BLDC GLUCOMTR-MCNC: 122 MG/DL — HIGH (ref 70–99)
GLUCOSE BLDC GLUCOMTR-MCNC: 200 MG/DL — HIGH (ref 70–99)
GLUCOSE BLDC GLUCOMTR-MCNC: 222 MG/DL — HIGH (ref 70–99)
GLUCOSE BLDC GLUCOMTR-MCNC: 236 MG/DL — HIGH (ref 70–99)
GLUCOSE SERPL-MCNC: 120 MG/DL — HIGH (ref 70–99)
HCT VFR BLD CALC: 39.9 % — SIGNIFICANT CHANGE UP (ref 34.5–45)
HGB BLD-MCNC: 11.8 G/DL — SIGNIFICANT CHANGE UP (ref 11.5–15.5)
MAGNESIUM SERPL-MCNC: 2 MG/DL — SIGNIFICANT CHANGE UP (ref 1.6–2.6)
MCHC RBC-ENTMCNC: 22.5 PG — LOW (ref 27–34)
MCHC RBC-ENTMCNC: 29.6 GM/DL — LOW (ref 32–36)
MCV RBC AUTO: 76 FL — LOW (ref 80–100)
NRBC # BLD: 0 /100 WBCS — SIGNIFICANT CHANGE UP
NRBC # FLD: 0 K/UL — SIGNIFICANT CHANGE UP
PHOSPHATE SERPL-MCNC: 4 MG/DL — SIGNIFICANT CHANGE UP (ref 2.5–4.5)
PLATELET # BLD AUTO: 271 K/UL — SIGNIFICANT CHANGE UP (ref 150–400)
POTASSIUM SERPL-MCNC: 3.9 MMOL/L — SIGNIFICANT CHANGE UP (ref 3.5–5.3)
POTASSIUM SERPL-SCNC: 3.9 MMOL/L — SIGNIFICANT CHANGE UP (ref 3.5–5.3)
RBC # BLD: 5.25 M/UL — HIGH (ref 3.8–5.2)
RBC # FLD: 16.6 % — HIGH (ref 10.3–14.5)
SODIUM SERPL-SCNC: 140 MMOL/L — SIGNIFICANT CHANGE UP (ref 135–145)
WBC # BLD: 6.87 K/UL — SIGNIFICANT CHANGE UP (ref 3.8–10.5)
WBC # FLD AUTO: 6.87 K/UL — SIGNIFICANT CHANGE UP (ref 3.8–10.5)

## 2021-06-06 PROCEDURE — 99233 SBSQ HOSP IP/OBS HIGH 50: CPT

## 2021-06-06 PROCEDURE — 99232 SBSQ HOSP IP/OBS MODERATE 35: CPT

## 2021-06-06 RX ORDER — TRAMADOL HYDROCHLORIDE 50 MG/1
25 TABLET ORAL ONCE
Refills: 0 | Status: DISCONTINUED | OUTPATIENT
Start: 2021-06-06 | End: 2021-06-06

## 2021-06-06 RX ADMIN — CHLORHEXIDINE GLUCONATE 1 APPLICATION(S): 213 SOLUTION TOPICAL at 08:35

## 2021-06-06 RX ADMIN — APIXABAN 5 MILLIGRAM(S): 2.5 TABLET, FILM COATED ORAL at 17:17

## 2021-06-06 RX ADMIN — MEXILETINE HYDROCHLORIDE 200 MILLIGRAM(S): 150 CAPSULE ORAL at 21:29

## 2021-06-06 RX ADMIN — BUDESONIDE AND FORMOTEROL FUMARATE DIHYDRATE 2 PUFF(S): 160; 4.5 AEROSOL RESPIRATORY (INHALATION) at 21:28

## 2021-06-06 RX ADMIN — INSULIN GLARGINE 12 UNIT(S): 100 INJECTION, SOLUTION SUBCUTANEOUS at 21:46

## 2021-06-06 RX ADMIN — BUDESONIDE AND FORMOTEROL FUMARATE DIHYDRATE 2 PUFF(S): 160; 4.5 AEROSOL RESPIRATORY (INHALATION) at 08:34

## 2021-06-06 RX ADMIN — MONTELUKAST 10 MILLIGRAM(S): 4 TABLET, CHEWABLE ORAL at 08:34

## 2021-06-06 RX ADMIN — TRAMADOL HYDROCHLORIDE 25 MILLIGRAM(S): 50 TABLET ORAL at 21:29

## 2021-06-06 RX ADMIN — POLYETHYLENE GLYCOL 3350 17 GRAM(S): 17 POWDER, FOR SOLUTION ORAL at 08:35

## 2021-06-06 RX ADMIN — Medication 60 MILLIGRAM(S): at 17:17

## 2021-06-06 RX ADMIN — TRAMADOL HYDROCHLORIDE 25 MILLIGRAM(S): 50 TABLET ORAL at 22:29

## 2021-06-06 RX ADMIN — MEXILETINE HYDROCHLORIDE 200 MILLIGRAM(S): 150 CAPSULE ORAL at 06:11

## 2021-06-06 RX ADMIN — Medication 4 MILLIGRAM(S): at 06:11

## 2021-06-06 RX ADMIN — TIOTROPIUM BROMIDE 1 CAPSULE(S): 18 CAPSULE ORAL; RESPIRATORY (INHALATION) at 08:34

## 2021-06-06 RX ADMIN — POLYETHYLENE GLYCOL 3350 17 GRAM(S): 17 POWDER, FOR SOLUTION ORAL at 17:17

## 2021-06-06 RX ADMIN — Medication 60 MILLIGRAM(S): at 08:34

## 2021-06-06 RX ADMIN — SENNA PLUS 2 TABLET(S): 8.6 TABLET ORAL at 21:29

## 2021-06-06 RX ADMIN — PANTOPRAZOLE SODIUM 40 MILLIGRAM(S): 20 TABLET, DELAYED RELEASE ORAL at 06:10

## 2021-06-06 RX ADMIN — ATORVASTATIN CALCIUM 20 MILLIGRAM(S): 80 TABLET, FILM COATED ORAL at 21:29

## 2021-06-06 RX ADMIN — Medication 2: at 12:25

## 2021-06-06 RX ADMIN — MEXILETINE HYDROCHLORIDE 200 MILLIGRAM(S): 150 CAPSULE ORAL at 12:25

## 2021-06-06 RX ADMIN — APIXABAN 5 MILLIGRAM(S): 2.5 TABLET, FILM COATED ORAL at 06:09

## 2021-06-06 RX ADMIN — Medication 2: at 17:17

## 2021-06-06 RX ADMIN — ROFLUMILAST 250 MICROGRAM(S): 500 TABLET ORAL at 08:34

## 2021-06-06 NOTE — PROGRESS NOTE ADULT - PROBLEM SELECTOR PLAN 4
- c/w home methylprednisolone, pantoprazole

## 2021-06-06 NOTE — PROGRESS NOTE ADULT - SUBJECTIVE AND OBJECTIVE BOX
Cardiology/Vascular Medicine Inpatient Progress Note    No new complaints  Reviewed renal artery US with patient -- no evidence of KENZIE    Telemetry: sinus bradycardia to sinus rhythm, intermittent PVCs  BPs under acceptable control on current regimen    On my exam, patient appeared clinically and hemodynamically stable.  Patient current on mexiletine as per EP recommendations    No objection to discharge from our perspective with f/u in the office within 2 weeks.    Vital Signs Last 24 Hrs  T(C): 36.7 (06 Jun 2021 11:42), Max: 36.7 (06 Jun 2021 08:31)  T(F): 98.1 (06 Jun 2021 11:42), Max: 98.1 (06 Jun 2021 08:31)  HR: 65 (06 Jun 2021 11:42) (59 - 72)  BP: 118/43 (06 Jun 2021 11:42) (118/43 - 139/62)  BP(mean): --  RR: 18 (06 Jun 2021 11:42) (16 - 18)  SpO2: 98% (06 Jun 2021 08:31) (98% - 100%)    Appearance: NAD  HEENT:   No JVD  Cardiovascular: Normal S1 S2  Respiratory: Lungs clear to auscultation	  Psychiatry: Awake, alert  Gastrointestinal:  Soft, Non-tender, + BS	  Neurologic: Non-focal  Extremities: No edema    MEDICATIONS  (STANDING):  apixaban 5 milliGRAM(s) Oral every 12 hours  atorvastatin 20 milliGRAM(s) Oral at bedtime  budesonide 160 MICROgram(s)/formoterol 4.5 MICROgram(s) Inhaler 2 Puff(s) Inhalation two times a day  chlorhexidine 2% Cloths 1 Application(s) Topical daily  dextrose 40% Gel 15 Gram(s) Oral once  dextrose 5%. 1000 milliLiter(s) (50 mL/Hr) IV Continuous <Continuous>  dextrose 5%. 1000 milliLiter(s) (100 mL/Hr) IV Continuous <Continuous>  dextrose 50% Injectable 25 Gram(s) IV Push once  dextrose 50% Injectable 12.5 Gram(s) IV Push once  dextrose 50% Injectable 25 Gram(s) IV Push once  diltiazem    Tablet 60 milliGRAM(s) Oral every 12 hours  glucagon  Injectable 1 milliGRAM(s) IntraMuscular once  insulin glargine Injectable (LANTUS) 12 Unit(s) SubCutaneous at bedtime  insulin lispro (ADMELOG) corrective regimen sliding scale   SubCutaneous three times a day before meals  insulin lispro (ADMELOG) corrective regimen sliding scale   SubCutaneous at bedtime  methylPREDNISolone 4 milliGRAM(s) Oral daily  mexiletine 200 milliGRAM(s) Oral three times a day  montelukast 10 milliGRAM(s) Oral daily  pantoprazole    Tablet 40 milliGRAM(s) Oral before breakfast  polyethylene glycol 3350 17 Gram(s) Oral two times a day  roflumilast 250 MICROGram(s) Oral daily  senna 2 Tablet(s) Oral at bedtime  tiotropium 18 MICROgram(s) Capsule 1 Capsule(s) Inhalation daily    MEDICATIONS  (PRN):  albuterol/ipratropium for Nebulization 3 milliLiter(s) Nebulizer every 6 hours PRN Shortness of Breath and/or Wheezing  ondansetron    Tablet 4 milliGRAM(s) Oral three times a day PRN Nausea and/or Vomiting    LABS:	 	                             11.8   6.87  )-----------( 271      ( 06 Jun 2021 07:36 )             39.9   06-06    140  |  105  |  18  ----------------------------<  120<H>  3.9   |  25  |  0.80    Ca    8.9      06 Jun 2021 07:36  Phos  4.0     06-06  Mg     2.0     06-06          < from: Transthoracic Echocardiogram (04.23.21 @ 09:26) >    Patient name: RAYRAY RODRIGUEZ  YOB: 1950   Age: 70 (F)   MR#: [id number]  Study Date: 4/23/2021  [facility]  Study quality: Technically good  Referring Physician: Ronn Narayanan MD  Blood Pressure: 148/52 mmHg  Height: 170 cm  Weight: 64 kg  BSA: 1.8 m2  ------------------------------------------------------------------------  PROCEDURE: Transthoracic echocardiogram with 2-D, M-Mode  and complete spectral and color flow Doppler.  INDICATION: Palpitations (R00.2)  ------------------------------------------------------------------------  DIMENSIONS:  Dimensions:     Normal Values:  LA:     4.1 cm    2.0 - 4.0 cm  Ao:     3.3 cm    2.0 - 3.8 cm  SEPTUM: 0.7 cm    0.6 - 1.2 cm  PWT:    0.7 cm0.6 - 1.1 cm  LVIDd:  4.0 cm    3.0 - 5.6 cm  LVIDs:  2.7 cm    1.8 - 4.0 cm  Derived Variables:  LVMI: 45 g/m2  RWT: 0.35  Fractional short: 33 %  Ejection Fraction (Teicholtz): 61 %  ------------------------------------------------------------------------  OBSERVATIONS:  Mitral Valve: Mitral annular calcification, otherwise  normal mitral valve. Minimal mitral regurgitation.  Aortic Root: Normal aortic root.  Aortic Valve: Calcified trileaflet aortic valve with normal  opening. Moderate aortic regurgitation.  Vena contracta  width about  0.4 cm.  Left Atrium: Mildly dilated left atrium.  LA volume index =  35 cc/m2.  Left Ventricle: Normal left ventricular systolic function.  No segmental wall motion abnormalities. Normal left  ventricular internal dimensions and wall thicknesses. Mild  diastolic dysfunction (Stage I).  Right Heart: Normal right atrium. Normal right ventricular  size and function. Normal tricuspid valve. Minimal  tricuspid regurgitation. Normal pulmonic valve. Minimal  pulmonic regurgitation.  Pericardium/PleuraNormal pericardium with no pericardial  effusion.  ------------------------------------------------------------------------  CONCLUSIONS:  1. Mitral annular calcification, otherwise normal mitral  valve. Minimal mitral regurgitation.  2. Calcified trileaflet aortic valve with normal opening.  Moderate aortic regurgitation.  Vena contracta width about  0.4 cm.  3. Mildly dilated left atrium.  LA volume index = 35 cc/m2.  4. Normal left ventricular internaldimensions and wall  thicknesses.  5. Normal left ventricular systolic function. No segmental  wall motion abnormalities.  6. Mild diastolic dysfunction (Stage I).  7. Normal right ventricular size and function.  ------------------------------------------------------------------------  Confirmed on  4/23/2021 - 10:42:27 by Sergio Arizmendi M.D.,  [facility], Critical access hospital  ------------------------------------------------------------------------    < end of copied text >        "Thank you for the opportunity to participate in the care of this patient."      KATALINA GUERRA MD; Attending Cardiologist  This document has been electronically signed. Jan 12 2021  2:13PM    < end of copied text >  < from: Xray Chest 2 Views PA/Lat (06.01.21 @ 18:41) >  EXAM:  XR CHEST PA LAT 2V        PROCEDURE DATE:  Jun 1 2021         INTERPRETATION:  CLINICAL INDICATION: Chest pain.    TECHNIQUE: PA and lateral views of the chest.    COMPARISON: Chest radiograph 4/20/2021    FINDINGS:    Right chest wall infusion port with catheter tip in the SVC.  Cardiac size is within normal limits.  The lungs are clear.  There are no pleural effusions. No pneumothorax.  Degenerative changes of the thoracic spine.    IMPRESSION:  Clear lungs.    CECILIA BELCHER MD; Resident Radiology  This document has been electronically signed.  SAHIL ADKINS MD; Attending Radiologist  This document has been electronically signed. Christopher  2 2021  8:12AM    < end of copied text >  < from: MR Cardiac No Cont (01.04.21 @ 22:27) >    EXAM:  MR CARD MORPH FUNCTION                          *** ADDENDUM 02/09/2021  ***    RVEF 56%  RVEDV- 117ml  RVESV- 51ml  SV: 66ml    RVEDVi- 68 ml/m2  RVESVi- 30ml/m2  SVi- 38ml/m2     Correction to report findings:    Mild to Moderate Aortic regurgitation based  on regurgitation volume 25ml  and Regurgitation fraction of 38%.      *** END OF ADDENDUM 02/09/2021  ***      PROCEDURE DATE:  01/04/2021          INTERPRETATION:  I. Clinical Information: 69 yo F with Aortic Insufficiency    II. Technique: Comprehensive Cardiac MRI examination was performed on a 1.5 T scanner using standard cardiac coil, cardiac gating, and standard pulse sequences for the assessment of cardiac morphology, function, and viability.  .    Ht: 170cm  Wt: 62kg  BSA: 1.71    Contrast:  mL of Multihance / Gadavist    III. Comparison: No images available for comparison.    IV. Findings    A. Cardiac morphology:    1. Left ventricle  a. Global systolic function: Global hypokinesis  b. Cavity size: Normal  c. Wall thickness: Normal    2. Right ventricle  a. Global systolic function: Normal  b. Cavity size: Normal    3. Aortic valve  a. Leaflets:  Calcified  b. Aortic Regurgitation: Moderate to severe AI  c. Aortic stenosis: Absent    4. Mitral valve  a. Leaflets: Normal  b. Leaflet mobility: Normal  c. Mitral regurgitation: Absent  d. Mitral stenosis: Absent    5. Tricuspid valve  a. Leaflets: Normal  b. Leaflet mobility: Normal  c. Tricuspid regurgitation: Absent  d. Tricuspid stenosis: Absent    6.Pulmonic valve  a. Leaflets: Normal  b. PI: Absent  c. PS: Absent    7. Left atrium  a. Size: Normal  b. Left atrial volume: 18ml  c. Left atrial volume index (Biplane method): 10mL/m2    8. Right atrium  a. Size:  Normal  b. Right atrial area: 14cm2    9. Pericardium  a.Effusion: None    10. Aorta  No significant abnormalities in the visualized portions of the thoracic aorta    11. Pulmonary artery  No significant abnormalities in the visualized portions of the pulmonary artery    B. Ventricular volume measurements    LVEF: 72%  LVEDV: 110mL  LVESV:30mL  SV: 80mL    LVEDVi:  64mL/m2  LVESVi: 18 mL/2  LVSVi: 47 mL/m2        C.Myocardial wall motion by regions (using 17 segment model):    1.Base  a. Anterior:  Normal  b. Anteroseptal: Normal  c. Anterolateral: Normal  d. Inferior: Normal  e. Inferoseptal:  Normal  f. Inferolateral:  Normal    2.  Mid  a. Anterior:  Normal  b. Anteroseptal:  Normal  c. Anterolateral:  Normal  d. Inferior:  Normal  e. Inferoseptal:  Normal  f. Inferolateral:  Normal    3.Apical  a. Anterior: Normal  b. Septal:    Normal  c. Inferior:    Normal  d. Lateral:    Normal    4.True [place]: Normal        E. Q-flow analysis (stenosis/regurgitation/shunt):    Qp:  Stroke volume:38 ml  Forward flow: 38ml  Backward flow: 0 ml  Regurgitant fraction: 0%    Qs:  Stroke volume: 41ml  Forward flow: 65ml  Backward flow: 25ml  Regurgitant fraction: 38%    Qp/Qs:0.92        INTERPRETATION:  1.  The left ventricle (LV) is normal in size. There is no evidence of LV hypertrophy. Left ventricular global systolic function is reduced The LV ejection fraction is  72 %.  2.  There is Moderate to Severe aortic insufficiency  3.  The right ventricle (RV) is normal in size. RV global systolic function is Normal  4.  No significant abnormalities of the visualized portions of the great vessels.      The sequences used in this study were designed for imaging cardiac structures and are suboptimal for imaging other structures and organs    ***Please see the addendum at the top of this report. It may contain additional important information or changes.****      Thank you for the opportunity to participate in the care of this patient.      MINISTERIO ROWLAND MD; Attending Cardiologist  This document has been electronically signed. Jan 52021  4:31PM  Addend:MINISTERIO ROWLAND MD; Attending Cardiologist  This addendum was electronically signed on: Feb 9 2021  6:39PM.    < end of copied text >

## 2021-06-06 NOTE — PROGRESS NOTE ADULT - PROBLEM SELECTOR PLAN 1
- Likely had episode of bigeminy vs PVCs vs other arrhythmia at home   - No episodes here, so far  cont telemetry monitoring  - c/w mexiletine  await cards f/u
- Likely had episode of bigeminy vs PVCs vs other arrhythmia at home   - No episodes here, so far  cont telemetry monitoring  - c/w mexiletine  EP to see pt today  30 sec of bigeminy
- Likely had episode of bigeminy vs PVCs vs other arrhythmia at home   - No episodes here, so far  cont telemetry monitoring  - c/w mexiletine  spoke with Dr Aragon, poss related to BP, will investigate further into her BP as pt reports no h/o HTN
- Likely had episode of bigeminy vs PVCs vs other arrhythmia at home   - No episodes here so far; cont telemetry monitoring - NSR 60s w/ occasional PVCs.  - c/w mexiletine  - day hospitalist spoke with Dr Aragon, poss related to BP, will investigate further into her BP as pt reports no h/o HTN  - pending doppler to asses for renal artery stenosis
- Likely had episode of bigeminy vs PVCs vs other arrhythmia at home   - No episodes here so far; cont telemetry monitoring - NSR 60s w/ occasional PVCs.  - c/w mexiletine  - team spoke to Dr Aragon, poss related to BP, will investigate further into her BP as pt reports no h/o HTN  - VA Duplex Abdomen/Pelvis Complete. (06.06.21 @ 09:14) : No evidence of hemodynamically significant stenoses in  the proximal right and left renal arteries. The right and left renal veins appear patent, without obvious evidence of thrombosis.   - Palpitations now resolved

## 2021-06-06 NOTE — PROGRESS NOTE ADULT - PROBLEM SELECTOR PLAN 3
- c/w breo ellipta (with therapeutic interchange), abhi prn, spiriva, daliresp, singulair

## 2021-06-06 NOTE — PROGRESS NOTE ADULT - PROBLEM SELECTOR PLAN 2
pt may use own dupixent  - Has chronic cough/sputum production without change, no wheezing or SOB currently, lungs clear to auscultation, no suspicion for exacerbation at present  chest PT PRN
pt takes dupixent, will clarify if pt has her own meds here as this is not available in the hospital  - Has chronic cough/sputum production without change, no wheezing or SOB currently, lungs clear to auscultation, no suspicion for exacerbation at present  chest PT PRN
pt takes dupixent, will clarify if pt has her own meds here as this is not available in the hospital  - Has chronic cough/sputum production without change, no wheezing or SOB currently, lungs clear to auscultation, no suspicion for exacerbation at present  chest PT PRN
pt may use own dupixent  - Has chronic cough/sputum production without change, no wheezing or SOB currently, lungs clear to auscultation, no suspicion for exacerbation at present  chest PT PRN
pt may use own dupixent  - Has chronic cough/sputum production without change, no wheezing or SOB currently, lungs clear to auscultation, no suspicion for exacerbation at present  chest PT PRN

## 2021-06-06 NOTE — PROGRESS NOTE ADULT - PROBLEM SELECTOR PLAN 5
- c/w home apixaban, diltiazem

## 2021-06-06 NOTE — PROGRESS NOTE ADULT - PROBLEM SELECTOR PLAN 8
no h/o HTN  BP fluctuates  will check renal artery dopplers  EP feels PVCs related to BP fluctuations no h/o HTN  BP fluctuates  renal artery dopplers negative for stenosis   EP feels PVCs related to BP fluctuations

## 2021-06-06 NOTE — PROGRESS NOTE ADULT - SUBJECTIVE AND OBJECTIVE BOX
Patient is a 70y old  Female who presents with a chief complaint of Palpitations with associated SOB and lightheadedness (06 Jun 2021 14:07)    [facility] Hospitalist - Department of Medicine   Barrett Pace DO   Pager: [phone]  Cell: [phone]    SUBJECTIVE / OVERNIGHT EVENTS: O/N NSR, no sustained arrythmia events. Pt denies any complaints this AM. Feels well, no CP, palpitations. When seen this AM returned from doppler.     MEDICATIONS  (STANDING):  apixaban 5 milliGRAM(s) Oral every 12 hours  atorvastatin 20 milliGRAM(s) Oral at bedtime  budesonide 160 MICROgram(s)/formoterol 4.5 MICROgram(s) Inhaler 2 Puff(s) Inhalation two times a day  chlorhexidine 2% Cloths 1 Application(s) Topical daily  dextrose 40% Gel 15 Gram(s) Oral once  dextrose 5%. 1000 milliLiter(s) (50 mL/Hr) IV Continuous <Continuous>  dextrose 5%. 1000 milliLiter(s) (100 mL/Hr) IV Continuous <Continuous>  dextrose 50% Injectable 25 Gram(s) IV Push once  dextrose 50% Injectable 12.5 Gram(s) IV Push once  dextrose 50% Injectable 25 Gram(s) IV Push once  diltiazem    Tablet 60 milliGRAM(s) Oral every 12 hours  glucagon  Injectable 1 milliGRAM(s) IntraMuscular once  insulin glargine Injectable (LANTUS) 12 Unit(s) SubCutaneous at bedtime  insulin lispro (ADMELOG) corrective regimen sliding scale   SubCutaneous three times a day before meals  insulin lispro (ADMELOG) corrective regimen sliding scale   SubCutaneous at bedtime  methylPREDNISolone 4 milliGRAM(s) Oral daily  mexiletine 200 milliGRAM(s) Oral three times a day  montelukast 10 milliGRAM(s) Oral daily  pantoprazole    Tablet 40 milliGRAM(s) Oral before breakfast  polyethylene glycol 3350 17 Gram(s) Oral two times a day  roflumilast 250 MICROGram(s) Oral daily  senna 2 Tablet(s) Oral at bedtime  tiotropium 18 MICROgram(s) Capsule 1 Capsule(s) Inhalation daily    MEDICATIONS  (PRN):  albuterol/ipratropium for Nebulization 3 milliLiter(s) Nebulizer every 6 hours PRN Shortness of Breath and/or Wheezing  ondansetron    Tablet 4 milliGRAM(s) Oral three times a day PRN Nausea and/or Vomiting      Vital Signs Last 24 Hrs  T(C): 36.7 (06 Jun 2021 11:42), Max: 36.7 (06 Jun 2021 08:31)  T(F): 98.1 (06 Jun 2021 11:42), Max: 98.1 (06 Jun 2021 08:31)  HR: 66 (06 Jun 2021 17:12) (59 - 72)  BP: 120/62 (06 Jun 2021 17:12) (118/43 - 139/62)  BP(mean): --  RR: 18 (06 Jun 2021 11:42) (16 - 18)  SpO2: 98% (06 Jun 2021 08:31) (98% - 100%)  CAPILLARY BLOOD GLUCOSE      POCT Blood Glucose.: 236 mg/dL (06 Jun 2021 17:13)  POCT Blood Glucose.: 222 mg/dL (06 Jun 2021 12:10)  POCT Blood Glucose.: 122 mg/dL (06 Jun 2021 08:18)  POCT Blood Glucose.: 226 mg/dL (05 Jun 2021 21:59)      PHYSICAL EXAM:  GENERAL: NAD, well-developed  HEAD:  Atraumatic, Normocephalic  EYES: exophthalmos   NECK: Supple, No JVD  CHEST/LUNG: Clear to auscultation bilaterally; No wheeze  HEART: Regular rate and rhythm; No murmurs, rubs, or gallops  ABDOMEN: Soft, Nontender, Nondistended; Bowel sounds present. +ostomy   EXTREMITIES:  2+ Peripheral Pulses, No clubbing, cyanosis, or edema  PSYCH: AAOx3  NEUROLOGY: non-focal  SKIN: No rashes or lesions    LABS:                        11.8   6.87  )-----------( 271      ( 06 Jun 2021 07:36 )             39.9     06-06    140  |  105  |  18  ----------------------------<  120<H>  3.9   |  25  |  0.80    Ca    8.9      06 Jun 2021 07:36  Phos  4.0     06-06  Mg     2.0     06-06                RADIOLOGY & ADDITIONAL TESTS:

## 2021-06-07 ENCOUNTER — TRANSCRIPTION ENCOUNTER (OUTPATIENT)
Age: 71
End: 2021-06-07

## 2021-06-07 VITALS
TEMPERATURE: 98 F | OXYGEN SATURATION: 98 % | RESPIRATION RATE: 16 BRPM | HEART RATE: 64 BPM | SYSTOLIC BLOOD PRESSURE: 146 MMHG | DIASTOLIC BLOOD PRESSURE: 50 MMHG

## 2021-06-07 LAB
ANION GAP SERPL CALC-SCNC: 15 MMOL/L — HIGH (ref 7–14)
BUN SERPL-MCNC: 18 MG/DL — SIGNIFICANT CHANGE UP (ref 7–23)
CALCIUM SERPL-MCNC: 9 MG/DL — SIGNIFICANT CHANGE UP (ref 8.4–10.5)
CHLORIDE SERPL-SCNC: 104 MMOL/L — SIGNIFICANT CHANGE UP (ref 98–107)
CO2 SERPL-SCNC: 18 MMOL/L — LOW (ref 22–31)
CREAT SERPL-MCNC: 0.72 MG/DL — SIGNIFICANT CHANGE UP (ref 0.5–1.3)
GLUCOSE BLDC GLUCOMTR-MCNC: 154 MG/DL — HIGH (ref 70–99)
GLUCOSE BLDC GLUCOMTR-MCNC: 262 MG/DL — HIGH (ref 70–99)
GLUCOSE SERPL-MCNC: 133 MG/DL — HIGH (ref 70–99)
HCT VFR BLD CALC: 40.5 % — SIGNIFICANT CHANGE UP (ref 34.5–45)
HGB BLD-MCNC: 12.7 G/DL — SIGNIFICANT CHANGE UP (ref 11.5–15.5)
MAGNESIUM SERPL-MCNC: 2 MG/DL — SIGNIFICANT CHANGE UP (ref 1.6–2.6)
MCHC RBC-ENTMCNC: 23.2 PG — LOW (ref 27–34)
MCHC RBC-ENTMCNC: 31.4 GM/DL — LOW (ref 32–36)
MCV RBC AUTO: 73.9 FL — LOW (ref 80–100)
NRBC # BLD: 0 /100 WBCS — SIGNIFICANT CHANGE UP
NRBC # FLD: 0 K/UL — SIGNIFICANT CHANGE UP
PHOSPHATE SERPL-MCNC: 3.9 MG/DL — SIGNIFICANT CHANGE UP (ref 2.5–4.5)
PLATELET # BLD AUTO: 228 K/UL — SIGNIFICANT CHANGE UP (ref 150–400)
POTASSIUM SERPL-MCNC: 4.6 MMOL/L — SIGNIFICANT CHANGE UP (ref 3.5–5.3)
POTASSIUM SERPL-SCNC: 4.6 MMOL/L — SIGNIFICANT CHANGE UP (ref 3.5–5.3)
RBC # BLD: 5.48 M/UL — HIGH (ref 3.8–5.2)
RBC # FLD: 17.2 % — HIGH (ref 10.3–14.5)
SODIUM SERPL-SCNC: 137 MMOL/L — SIGNIFICANT CHANGE UP (ref 135–145)
WBC # BLD: 8.36 K/UL — SIGNIFICANT CHANGE UP (ref 3.8–10.5)
WBC # FLD AUTO: 8.36 K/UL — SIGNIFICANT CHANGE UP (ref 3.8–10.5)

## 2021-06-07 PROCEDURE — 99239 HOSP IP/OBS DSCHRG MGMT >30: CPT

## 2021-06-07 PROCEDURE — 99232 SBSQ HOSP IP/OBS MODERATE 35: CPT

## 2021-06-07 RX ADMIN — PANTOPRAZOLE SODIUM 40 MILLIGRAM(S): 20 TABLET, DELAYED RELEASE ORAL at 05:41

## 2021-06-07 RX ADMIN — POLYETHYLENE GLYCOL 3350 17 GRAM(S): 17 POWDER, FOR SOLUTION ORAL at 05:42

## 2021-06-07 RX ADMIN — MEXILETINE HYDROCHLORIDE 200 MILLIGRAM(S): 150 CAPSULE ORAL at 13:12

## 2021-06-07 RX ADMIN — Medication 300 UNIT(S): at 13:12

## 2021-06-07 RX ADMIN — TIOTROPIUM BROMIDE 1 CAPSULE(S): 18 CAPSULE ORAL; RESPIRATORY (INHALATION) at 08:43

## 2021-06-07 RX ADMIN — ROFLUMILAST 250 MICROGRAM(S): 500 TABLET ORAL at 08:43

## 2021-06-07 RX ADMIN — MONTELUKAST 10 MILLIGRAM(S): 4 TABLET, CHEWABLE ORAL at 08:43

## 2021-06-07 RX ADMIN — Medication 4 MILLIGRAM(S): at 05:41

## 2021-06-07 RX ADMIN — Medication 1: at 08:43

## 2021-06-07 RX ADMIN — Medication 3: at 12:32

## 2021-06-07 RX ADMIN — CHLORHEXIDINE GLUCONATE 1 APPLICATION(S): 213 SOLUTION TOPICAL at 08:46

## 2021-06-07 RX ADMIN — Medication 60 MILLIGRAM(S): at 05:41

## 2021-06-07 RX ADMIN — BUDESONIDE AND FORMOTEROL FUMARATE DIHYDRATE 2 PUFF(S): 160; 4.5 AEROSOL RESPIRATORY (INHALATION) at 08:42

## 2021-06-07 RX ADMIN — MEXILETINE HYDROCHLORIDE 200 MILLIGRAM(S): 150 CAPSULE ORAL at 05:41

## 2021-06-07 RX ADMIN — APIXABAN 5 MILLIGRAM(S): 2.5 TABLET, FILM COATED ORAL at 05:41

## 2021-06-07 NOTE — PROGRESS NOTE ADULT - PROBLEM SELECTOR PROBLEM 5
Paroxysmal atrial fibrillation

## 2021-06-07 NOTE — PROGRESS NOTE ADULT - PROBLEM SELECTOR PROBLEM 4
Adrenal insufficiency

## 2021-06-07 NOTE — PROGRESS NOTE ADULT - NSICDXPILOT_GEN_ALL_CORE
[place]

## 2021-06-07 NOTE — CHART NOTE - NSCHARTNOTEFT_GEN_A_CORE
pt seen and examined by me  states she is going home today  feels well  +BM from stoma  less palps  no chest pain  VSS  a/w palp  better BP control  33 min spent with dc planning

## 2021-06-07 NOTE — PROGRESS NOTE ADULT - PROBLEM SELECTOR PROBLEM 3
Severe persistent asthma without complication

## 2021-06-07 NOTE — PROGRESS NOTE ADULT - SUBJECTIVE AND OBJECTIVE BOX
Patient is seen and examined. Denies any chest pain, SOB, palpitations or dizziness    PAST MEDICAL & SURGICAL HISTORY:  Atrial fibrillation  paroxysmal, on eliquis  Diabetes  Type 2  Hypertension  COPD (chronic obstructive pulmonary disease)  Adrenal insufficiency  Medrol daily for over 50 years  Aortic insufficiency  moderate AR on echo 5/3/2018  Pelvic fracture  Asthma  Hypertension  Tracheobronchomalacia  diagnosed 2015, s/p bronchial thermoplasty 2016 (Dr Zapien); recent bronchoscopy 6/5/2018 revealed no evidence of tracheobronchomalacia in trachea or bronchial tubes  Colorectal cancer  4/2018- last treatment , chemo and radiation  Aortic disease  leaky valve  Rectal bleeding  Seizure  x 1 1/7/18  DVT (deep venous thrombosis)  15-20 years ago, took coumadin  TIA (transient ischemic attack)  multiple, last 5 years ago - presents as right-sided weakness  History of partial hysterectomy  30 years ago - fibroids  H/O total knee replacement, bilateral  5 years ago  History of sinus surgery  multiple sinus surgeries  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  H/O pelvic surgery  5 years ago - s/p fracture  History of surgery  tracheo thermoplasty 2016  History of tracheomalacia  2015 - attempted tracheal stenting ([facility])- course complicated by obstruction, respiratory failure, multiple CPR attempts -  stent discontinued; 10/20/2016 Tracheobronchoplasty (Prolene Mesh) performed at [facility] by Dr Zapien  S/P bronchoscopy  6/5/2018 - [place] (Dr Zapien) no evidence of tracheobronchomalacia in trachea or bronchial tubes  Rectal bleeding  exam under anesthesia ([facility]) 2/2018        MEDICATIONS  (STANDING):  apixaban 5 milliGRAM(s) Oral every 12 hours  atorvastatin 20 milliGRAM(s) Oral at bedtime  budesonide 160 MICROgram(s)/formoterol 4.5 MICROgram(s) Inhaler 2 Puff(s) Inhalation two times a day  chlorhexidine 2% Cloths 1 Application(s) Topical daily  dextrose 40% Gel 15 Gram(s) Oral once  dextrose 5%. 1000 milliLiter(s) (50 mL/Hr) IV Continuous <Continuous>  dextrose 5%. 1000 milliLiter(s) (100 mL/Hr) IV Continuous <Continuous>  dextrose 50% Injectable 25 Gram(s) IV Push once  dextrose 50% Injectable 12.5 Gram(s) IV Push once  dextrose 50% Injectable 25 Gram(s) IV Push once  diltiazem    Tablet 60 milliGRAM(s) Oral every 12 hours  glucagon  Injectable 1 milliGRAM(s) IntraMuscular once  heparin  Lock Flush 100 Units/mL Injectable 300 Unit(s) IV Push once  insulin glargine Injectable (LANTUS) 12 Unit(s) SubCutaneous at bedtime  insulin lispro (ADMELOG) corrective regimen sliding scale   SubCutaneous three times a day before meals  insulin lispro (ADMELOG) corrective regimen sliding scale   SubCutaneous at bedtime  methylPREDNISolone 4 milliGRAM(s) Oral daily  mexiletine 200 milliGRAM(s) Oral three times a day  montelukast 10 milliGRAM(s) Oral daily  pantoprazole    Tablet 40 milliGRAM(s) Oral before breakfast  polyethylene glycol 3350 17 Gram(s) Oral two times a day  roflumilast 250 MICROGram(s) Oral daily  senna 2 Tablet(s) Oral at bedtime  tiotropium 18 MICROgram(s) Capsule 1 Capsule(s) Inhalation daily    MEDICATIONS  (PRN):  albuterol/ipratropium for Nebulization 3 milliLiter(s) Nebulizer every 6 hours PRN Shortness of Breath and/or Wheezing  ondansetron    Tablet 4 milliGRAM(s) Oral three times a day PRN Nausea and/or Vomiting      Vital Signs Last 24 Hrs  T(C): 36.5 (07 Jun 2021 05:46), Max: 36.7 (06 Jun 2021 22:42)  T(F): 97.7 (07 Jun 2021 05:46), Max: 98 (06 Jun 2021 22:42)  HR: 64 (07 Jun 2021 05:46) (64 - 79)  BP: 146/50 (07 Jun 2021 05:46) (120/62 - 146/50)  BP(mean): --  RR: 16 (07 Jun 2021 05:46) (16 - 16)  SpO2: 98% (07 Jun 2021 05:46) (98% - 98%)      INTERPRETATION OF TELEMETRY:      LABS:                        12.7   8.36  )-----------( 228      ( 07 Jun 2021 07:46 )             40.5     06-07    137  |  104  |  18  ----------------------------<  133<H>  4.6   |  18<L>  |  0.72    Ca    9.0      07 Jun 2021 07:46  Phos  3.9     06-07  Mg     2.0     06-07              I&O's Summary    06 Jun 2021 07:01  -  07 Jun 2021 07:00  --------------------------------------------------------  IN: 400 mL / OUT: 1200 mL / NET: -800 mL      BNP  RADIOLOGY & ADDITIONAL STUDIES:      PHYSICAL EXAM:    GENERAL: In no apparent distress, well nourished, and hydrated.  HEAD:  Atraumatic, Normocephalic  EYES: EOMI, PERRLA, conjunctiva and sclera clear  ENMT: No tonsillar erythema, exudates, or enlargement; Moist mucous membranes, Good dentition, No lesions  NECK: Supple and normal thyroid.  No JVD or carotid bruit.  Carotid pulse is 2+ bilaterally.  HEART: Regular rate and rhythm; No murmurs, rubs, or gallops.  PULMONARY: Clear to auscultation and percussion.  No rales, wheezing, or rhonchi bilaterally.  ABDOMEN: Soft, Nontender, Nondistended; Bowel sounds present  EXTREMITIES:  2+ Peripheral Pulses, No clubbing, cyanosis, or edema  LYMPH: No lymphadenopathy noted  NEUROLOGICAL: Grossly nonfocal         Patient is seen and examined. Denies any chest pain, SOB, palpitations or dizziness. Patient states she is feeling much better and is ready to go home    PAST MEDICAL & SURGICAL HISTORY:  Atrial fibrillation  paroxysmal, on eliquis  Diabetes  Type 2  Hypertension  COPD (chronic obstructive pulmonary disease)  Adrenal insufficiency  Medrol daily for over 50 years  Aortic insufficiency  moderate AR on echo 5/3/2018  Pelvic fracture  Asthma  Hypertension  Tracheobronchomalacia  diagnosed 2015, s/p bronchial thermoplasty 2016 (Dr Zapien); recent bronchoscopy 6/5/2018 revealed no evidence of tracheobronchomalacia in trachea or bronchial tubes  Colorectal cancer  4/2018- last treatment , chemo and radiation  Aortic disease  leaky valve  Rectal bleeding  Seizure  x 1 1/7/18  DVT (deep venous thrombosis)  15-20 years ago, took coumadin  TIA (transient ischemic attack)  multiple, last 5 years ago - presents as right-sided weakness  History of partial hysterectomy  30 years ago - fibroids  H/O total knee replacement, bilateral  5 years ago  History of sinus surgery  multiple sinus surgeries  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  H/O pelvic surgery  5 years ago - s/p fracture  History of surgery  tracheo thermoplasty 2016  History of tracheomalacia  2015 - attempted tracheal stenting ([facility])- course complicated by obstruction, respiratory failure, multiple CPR attempts -  stent discontinued; 10/20/2016 Tracheobronchoplasty (Prolene Mesh) performed at [facility] by Dr Zapien  S/P bronchoscopy  6/5/2018 - [place] (Dr Zapien) no evidence of tracheobronchomalacia in trachea or bronchial tubes  Rectal bleeding  exam under anesthesia ([facility]) 2/2018        MEDICATIONS  (STANDING):  apixaban 5 milliGRAM(s) Oral every 12 hours  atorvastatin 20 milliGRAM(s) Oral at bedtime  budesonide 160 MICROgram(s)/formoterol 4.5 MICROgram(s) Inhaler 2 Puff(s) Inhalation two times a day  chlorhexidine 2% Cloths 1 Application(s) Topical daily  dextrose 40% Gel 15 Gram(s) Oral once  dextrose 5%. 1000 milliLiter(s) (50 mL/Hr) IV Continuous <Continuous>  dextrose 5%. 1000 milliLiter(s) (100 mL/Hr) IV Continuous <Continuous>  dextrose 50% Injectable 25 Gram(s) IV Push once  dextrose 50% Injectable 12.5 Gram(s) IV Push once  dextrose 50% Injectable 25 Gram(s) IV Push once  diltiazem    Tablet 60 milliGRAM(s) Oral every 12 hours  glucagon  Injectable 1 milliGRAM(s) IntraMuscular once  heparin  Lock Flush 100 Units/mL Injectable 300 Unit(s) IV Push once  insulin glargine Injectable (LANTUS) 12 Unit(s) SubCutaneous at bedtime  insulin lispro (ADMELOG) corrective regimen sliding scale   SubCutaneous three times a day before meals  insulin lispro (ADMELOG) corrective regimen sliding scale   SubCutaneous at bedtime  methylPREDNISolone 4 milliGRAM(s) Oral daily  mexiletine 200 milliGRAM(s) Oral three times a day  montelukast 10 milliGRAM(s) Oral daily  pantoprazole    Tablet 40 milliGRAM(s) Oral before breakfast  polyethylene glycol 3350 17 Gram(s) Oral two times a day  roflumilast 250 MICROGram(s) Oral daily  senna 2 Tablet(s) Oral at bedtime  tiotropium 18 MICROgram(s) Capsule 1 Capsule(s) Inhalation daily    MEDICATIONS  (PRN):  albuterol/ipratropium for Nebulization 3 milliLiter(s) Nebulizer every 6 hours PRN Shortness of Breath and/or Wheezing  ondansetron    Tablet 4 milliGRAM(s) Oral three times a day PRN Nausea and/or Vomiting      Vital Signs Last 24 Hrs  T(C): 36.5 (07 Jun 2021 05:46), Max: 36.7 (06 Jun 2021 22:42)  T(F): 97.7 (07 Jun 2021 05:46), Max: 98 (06 Jun 2021 22:42)  HR: 64 (07 Jun 2021 05:46) (64 - 79)  BP: 146/50 (07 Jun 2021 05:46) (120/62 - 146/50)  BP(mean): --  RR: 16 (07 Jun 2021 05:46) (16 - 16)  SpO2: 98% (07 Jun 2021 05:46) (98% - 98%)      INTERPRETATION OF TELEMETRY: Sinus rhythm with HR 50s-80s      LABS:                        12.7   8.36  )-----------( 228      ( 07 Jun 2021 07:46 )             40.5     06-07    137  |  104  |  18  ----------------------------<  133<H>  4.6   |  18<L>  |  0.72    Ca    9.0      07 Jun 2021 07:46  Phos  3.9     06-07  Mg     2.0     06-07              I&O's Summary    06 Jun 2021 07:01  -  07 Jun 2021 07:00  --------------------------------------------------------  IN: 400 mL / OUT: 1200 mL / NET: -800 mL      PHYSICAL EXAM:    GENERAL: In no apparent distress, well nourished, and hydrated.  HEART: Regular rate and rhythm; No murmurs, rubs, or gallops.  PULMONARY: Clear to auscultation and percussion.  No rales, wheezing, or rhonchi bilaterally.  ABDOMEN: Soft, Nontender, Nondistended; Bowel sounds present  EXTREMITIES:  2+ Peripheral Pulses, No clubbing, cyanosis, or edema

## 2021-06-07 NOTE — PROGRESS NOTE ADULT - SUBJECTIVE AND OBJECTIVE BOX
Cardiology/Vascular Medicine Inpatient Progress Note    No new complaints  Reviewed renal artery US with patient -- no evidence of KENZIE    Telemetry: sinus bradycardia to sinus rhythm, intermittent PVCs  BPs under acceptable control on current regimen    On my exam, patient appeared clinically and hemodynamically stable.  Patient current on mexiletine as per EP recommendations    No objection to discharge from our perspective with f/u in the office within 2 weeks.    Vital Signs Last 24 Hrs  T(C): 36.5 (07 Jun 2021 05:46), Max: 36.7 (06 Jun 2021 08:31)  T(F): 97.7 (07 Jun 2021 05:46), Max: 98.1 (06 Jun 2021 08:31)  HR: 64 (07 Jun 2021 05:46) (61 - 79)  BP: 146/50 (07 Jun 2021 05:46) (118/43 - 146/50)  BP(mean): --  RR: 16 (07 Jun 2021 05:46) (16 - 18)  SpO2: 98% (07 Jun 2021 05:46) (98% - 98%)      Appearance: NAD  HEENT:   No JVD  Cardiovascular: Normal S1 S2  Respiratory: Lungs clear to auscultation	  Psychiatry: Awake, alert  Gastrointestinal:  Soft, Non-tender, + BS	  Neurologic: Non-focal  Extremities: No edema    MEDICATIONS  (STANDING):  apixaban 5 milliGRAM(s) Oral every 12 hours  atorvastatin 20 milliGRAM(s) Oral at bedtime  budesonide 160 MICROgram(s)/formoterol 4.5 MICROgram(s) Inhaler 2 Puff(s) Inhalation two times a day  chlorhexidine 2% Cloths 1 Application(s) Topical daily  dextrose 40% Gel 15 Gram(s) Oral once  dextrose 5%. 1000 milliLiter(s) (50 mL/Hr) IV Continuous <Continuous>  dextrose 5%. 1000 milliLiter(s) (100 mL/Hr) IV Continuous <Continuous>  dextrose 50% Injectable 25 Gram(s) IV Push once  dextrose 50% Injectable 12.5 Gram(s) IV Push once  dextrose 50% Injectable 25 Gram(s) IV Push once  diltiazem    Tablet 60 milliGRAM(s) Oral every 12 hours  glucagon  Injectable 1 milliGRAM(s) IntraMuscular once  insulin glargine Injectable (LANTUS) 12 Unit(s) SubCutaneous at bedtime  insulin lispro (ADMELOG) corrective regimen sliding scale   SubCutaneous three times a day before meals  insulin lispro (ADMELOG) corrective regimen sliding scale   SubCutaneous at bedtime  methylPREDNISolone 4 milliGRAM(s) Oral daily  mexiletine 200 milliGRAM(s) Oral three times a day  montelukast 10 milliGRAM(s) Oral daily  pantoprazole    Tablet 40 milliGRAM(s) Oral before breakfast  polyethylene glycol 3350 17 Gram(s) Oral two times a day  roflumilast 250 MICROGram(s) Oral daily  senna 2 Tablet(s) Oral at bedtime  tiotropium 18 MICROgram(s) Capsule 1 Capsule(s) Inhalation daily    MEDICATIONS  (PRN):  albuterol/ipratropium for Nebulization 3 milliLiter(s) Nebulizer every 6 hours PRN Shortness of Breath and/or Wheezing  ondansetron    Tablet 4 milliGRAM(s) Oral three times a day PRN Nausea and/or Vomiting      LABS:	 	                             11.8   6.87  )-----------( 271      ( 06 Jun 2021 07:36 )             39.9   06-06    140  |  105  |  18  ----------------------------<  120<H>  3.9   |  25  |  0.80    Ca    8.9      06 Jun 2021 07:36  Phos  4.0     06-06  Mg     2.0     06-06          < from: Transthoracic Echocardiogram (04.23.21 @ 09:26) >    Patient name: RAYRAY RODRIGUEZ  YOB: 1950   Age: 70 (F)   MR#: [id number]  Study Date: 4/23/2021  [facility]  Study quality: Technically good  Referring Physician: Ronn Narayanan MD  Blood Pressure: 148/52 mmHg  Height: 170 cm  Weight: 64 kg  BSA: 1.8 m2  ------------------------------------------------------------------------  PROCEDURE: Transthoracic echocardiogram with 2-D, M-Mode  and complete spectral and color flow Doppler.  INDICATION: Palpitations (R00.2)  ------------------------------------------------------------------------  DIMENSIONS:  Dimensions:     Normal Values:  LA:     4.1 cm    2.0 - 4.0 cm  Ao:     3.3 cm    2.0 - 3.8 cm  SEPTUM: 0.7 cm    0.6 - 1.2 cm  PWT:    0.7 cm0.6 - 1.1 cm  LVIDd:  4.0 cm    3.0 - 5.6 cm  LVIDs:  2.7 cm    1.8 - 4.0 cm  Derived Variables:  LVMI: 45 g/m2  RWT: 0.35  Fractional short: 33 %  Ejection Fraction (Teicholtz): 61 %  ------------------------------------------------------------------------  OBSERVATIONS:  Mitral Valve: Mitral annular calcification, otherwise  normal mitral valve. Minimal mitral regurgitation.  Aortic Root: Normal aortic root.  Aortic Valve: Calcified trileaflet aortic valve with normal  opening. Moderate aortic regurgitation.  Vena contracta  width about  0.4 cm.  Left Atrium: Mildly dilated left atrium.  LA volume index =  35 cc/m2.  Left Ventricle: Normal left ventricular systolic function.  No segmental wall motion abnormalities. Normal left  ventricular internal dimensions and wall thicknesses. Mild  diastolic dysfunction (Stage I).  Right Heart: Normal right atrium. Normal right ventricular  size and function. Normal tricuspid valve. Minimal  tricuspid regurgitation. Normal pulmonic valve. Minimal  pulmonic regurgitation.  Pericardium/PleuraNormal pericardium with no pericardial  effusion.  ------------------------------------------------------------------------  CONCLUSIONS:  1. Mitral annular calcification, otherwise normal mitral  valve. Minimal mitral regurgitation.  2. Calcified trileaflet aortic valve with normal opening.  Moderate aortic regurgitation.  Vena contracta width about  0.4 cm.  3. Mildly dilated left atrium.  LA volume index = 35 cc/m2.  4. Normal left ventricular internaldimensions and wall  thicknesses.  5. Normal left ventricular systolic function. No segmental  wall motion abnormalities.  6. Mild diastolic dysfunction (Stage I).  7. Normal right ventricular size and function.  ------------------------------------------------------------------------  Confirmed on  4/23/2021 - 10:42:27 by Sergio Arizmendi M.D.,  [facility]  ------------------------------------------------------------------------    < end of copied text >        "Thank you for the opportunity to participate in the care of this patient."      KATALINA GUERRA MD; Attending Cardiologist  This document has been electronically signed. Jan 12 2021  2:13PM    < end of copied text >  < from: Xray Chest 2 Views PA/Lat (06.01.21 @ 18:41) >  EXAM:  XR CHEST PA LAT 2V        PROCEDURE DATE:  Jun 1 2021         INTERPRETATION:  CLINICAL INDICATION: Chest pain.    TECHNIQUE: PA and lateral views of the chest.    COMPARISON: Chest radiograph 4/20/2021    FINDINGS:    Right chest wall infusion port with catheter tip in the SVC.  Cardiac size is within normal limits.  The lungs are clear.  There are no pleural effusions. No pneumothorax.  Degenerative changes of the thoracic spine.    IMPRESSION:  Clear lungs.    CECILIA BELCHER MD; Resident Radiology  This document has been electronically signed.  SAHIL ADKINS MD; Attending Radiologist  This document has been electronically signed. Christopher  2 2021  8:12AM    < end of copied text >  < from: MR Cardiac No Cont (01.04.21 @ 22:27) >    EXAM:  MR CARD MORPH FUNCTION                          *** ADDENDUM 02/09/2021  ***    RVEF 56%  RVEDV- 117ml  RVESV- 51ml  SV: 66ml    RVEDVi- 68 ml/m2  RVESVi- 30ml/m2  SVi- 38ml/m2     Correction to report findings:    Mild to Moderate Aortic regurgitation based  on regurgitation volume 25ml  and Regurgitation fraction of 38%.      *** END OF ADDENDUM 02/09/2021  ***      PROCEDURE DATE:  01/04/2021          INTERPRETATION:  I. Clinical Information: 71 yo F with Aortic Insufficiency    II. Technique: Comprehensive Cardiac MRI examination was performed on a 1.5 T scanner using standard cardiac coil, cardiac gating, and standard pulse sequences for the assessment of cardiac morphology, function, and viability.  .    Ht: 170cm  Wt: 62kg  BSA: 1.71    Contrast:  mL of Multihance / Gadavist    III. Comparison: No images available for comparison.    IV. Findings    A. Cardiac morphology:    1. Left ventricle  a. Global systolic function: Global hypokinesis  b. Cavity size: Normal  c. Wall thickness: Normal    2. Right ventricle  a. Global systolic function: Normal  b. Cavity size: Normal    3. Aortic valve  a. Leaflets:  Calcified  b. Aortic Regurgitation: Moderate to severe AI  c. Aortic stenosis: Absent    4. Mitral valve  a. Leaflets: Normal  b. Leaflet mobility: Normal  c. Mitral regurgitation: Absent  d. Mitral stenosis: Absent    5. Tricuspid valve  a. Leaflets: Normal  b. Leaflet mobility: Normal  c. Tricuspid regurgitation: Absent  d. Tricuspid stenosis: Absent    6.Pulmonic valve  a. Leaflets: Normal  b. PI: Absent  c. PS: Absent    7. Left atrium  a. Size: Normal  b. Left atrial volume: 18ml  c. Left atrial volume index (Biplane method): 10mL/m2    8. Right atrium  a. Size:  Normal  b. Right atrial area: 14cm2    9. Pericardium  a.Effusion: None    10. Aorta  No significant abnormalities in the visualized portions of the thoracic aorta    11. Pulmonary artery  No significant abnormalities in the visualized portions of the pulmonary artery    B. Ventricular volume measurements    LVEF: 72%  LVEDV: 110mL  LVESV:30mL  SV: 80mL    LVEDVi:  64mL/m2  LVESVi: 18 mL/2  LVSVi: 47 mL/m2        C.Myocardial wall motion by regions (using 17 segment model):    1.Base  a. Anterior:  Normal  b. Anteroseptal: Normal  c. Anterolateral: Normal  d. Inferior: Normal  e. Inferoseptal:  Normal  f. Inferolateral:  Normal    2.  Mid  a. Anterior:  Normal  b. Anteroseptal:  Normal  c. Anterolateral:  Normal  d. Inferior:  Normal  e. Inferoseptal:  Normal  f. Inferolateral:  Normal    3.Apical  a. Anterior: Normal  b. Septal:    Normal  c. Inferior:    Normal  d. Lateral:    Normal    4.True Stevens: Normal        E. Q-flow analysis (stenosis/regurgitation/shunt):    Qp:  Stroke volume:38 ml  Forward flow: 38ml  Backward flow: 0 ml  Regurgitant fraction: 0%    Qs:  Stroke volume: 41ml  Forward flow: 65ml  Backward flow: 25ml  Regurgitant fraction: 38%    Qp/Qs:0.92        INTERPRETATION:  1.  The left ventricle (LV) is normal in size. There is no evidence of LV hypertrophy. Left ventricular global systolic function is reduced The LV ejection fraction is  72 %.  2.  There is Moderate to Severe aortic insufficiency  3.  The right ventricle (RV) is normal in size. RV global systolic function is Normal  4.  No significant abnormalities of the visualized portions of the great vessels.      The sequences used in this study were designed for imaging cardiac structures and are suboptimal for imaging other structures and organs    ***Please see the addendum at the top of this report. It may contain additional important information or changes.****      Thank you for the opportunity to participate in the care of this patient.      MINISTERIO ROWLAND MD; Attending Cardiologist  This document has been electronically signed. Jan 52021  4:31PM  Addend:MINISTERIO ROWLAND MD; Attending Cardiologist  This addendum was electronically signed on: Feb 9 2021  6:39PM.    < end of copied text >

## 2021-06-07 NOTE — PROGRESS NOTE ADULT - PROBLEM SELECTOR PROBLEM 1
Palpitations

## 2021-06-07 NOTE — PROGRESS NOTE ADULT - ASSESSMENT
70 year old Female  with PMH of pAFib (on Eliquis and Cardizem), tracheobronchomalacia s/p tracheobronchoplasty, lung nodules, severe allergic asthma, adrenal insufficiency, T2DM, HTN, Colon CA s/p colostomy. Patient presented to [facility] ED with c/o worsening SOB/palpitations, symptoms are similar to recent  April admission when she was evaluated/treated  by Dr. Aragon. She was started on Mexiletine and monitored closely during hospital stay. Follows with Pj Glover (5/27) and Mahesh 5/13) on an outpatient basis.        EP Consult request for PVC re-evaluation on 6/3. Patient states she is feeling 'much better today compared to yesterday' and 'very few palpitations'.          Continue telemetry monitoring.  Mexiletine 200mg TID  Cardizem 60mg twice daily    CHADVASC Score= 4     Eliquis 5mg twice daily   Monitor lytes and replete K>4.0 and Mg>2.0.   Monitor TSH and LFTs  Appreciate Cardiology Recommendations  Follow up with Pulmonology (Dr. Allison)  Management per Primary Team  Will continue to monitor for PVC burden            
This is a 70F with history as above who presents to the hospital with complaints of palpitations with associated lightheadedness and SOB. 
This is a 70F with history as above who presents to the hospital with complaints of palpitations with associated lightheadedness and SOB.     Patient known to me from the office (2 recent office visits).    Patient is a 69 yo woman with Tracheobronchomalasia s/p Tracheobronchoplasty, Bronchiectasis, Severe Allergic Asthma, Adrenal Insuffiencey, DM2, HTN, Colorectal cancer s/p partial colectomy now with colostomy, PAF on Eliquis, and reecntly diagnosed Bigeminy who presents to the hospital with complaints of palpitations with associated SOB and lightheadedness on Tuesday morning. Patient was recently hospitalized at [facility] 4/20-4/29 for palpitations/SOB and was found to have PVCs/bigeminy. EP team started her on mexiletine with initial improvement of symptoms and she was discharged home.  Said that she was doing well at home but yesterday morning, noted having an episode of palpitations with associated SOB and lightheadedness.    She called our office with reports of her symptoms.  As symptoms were intolerable, she presented to an [facility] which subsequently advised that she go to ED because of an abnormal EKG.     In the ED, when evaluated by the Medicine attending, she reported feeling well, and denied having any further palpitations.  She also denied having any other associated cardiac complaints including chest pain, SOB, lightheadedness/syncope.     On arrival to the hospital, her vitals were T 97.6, P 78, /50, RR 18, O2 sat 100% RA. Her lab work did not show any significant abnormalities and her trops were stable at 17 (slightly lower than prior levels). Her CXR showed clear lungs. She was given cardizem 60mg PO x1 and mexiletine 200mg PO x1. She was admitted to medicine on telemetry.  (02 Jun 2021 02:15)    On my exam, patient appeared clinically and hemodynamically stable.  No new complaints  Telemetry: sinus bradycardia to sinus rhythm, intermittent PVCs, 4b NSVT  BPs under acceptable control on current regimen    On my exam, patient appeared clinically and hemodynamically stable.  Patient current on mexiletine as per EP recommendations; anticipate no change in regimen but will defer to EP team.    No further cardiac testing needed at this time.  No objection to discharge from our perspective with f/u in the office within 2 weeks.
This is a 70F with history as above who presents to the hospital with complaints of palpitations with associated lightheadedness and SOB.     Patient known to me from the office (2 recent office visits).    Patient is a 69 yo woman with Tracheobronchomalasia s/p Tracheobronchoplasty, Bronchiectasis, Severe Allergic Asthma, Adrenal Insuffiencey, DM2, HTN, Colorectal cancer s/p partial colectomy now with colostomy, PAF on Eliquis, and reecntly diagnosed Bigeminy who presents to the hospital with complaints of palpitations with associated SOB and lightheadedness on Tuesday morning. Patient was recently hospitalized at [facility] 4/20-4/29 for palpitations/SOB and was found to have PVCs/bigeminy. EP team started her on mexiletine with initial improvement of symptoms and she was discharged home.  Said that she was doing well at home but yesterday morning, noted having an episode of palpitations with associated SOB and lightheadedness.    She called our office with reports of her symptoms.  As symptoms were intolerable, [facility] advised that she go to ED because of an abnormal EKG.     In the ED, when evaluated by the Medicine attending, she reported feeling well, and denied having any further palpitations.  She also denied having any other associated cardiac complaints including chest pain, SOB, lightheadedness/syncope.     On arrival to the hospital, her vitals were T 97.6, P 78, /50, RR 18, O2 sat 100% RA. Her lab work did not show any significant abnormalities and her trops were stable at 17 (slightly lower than prior levels). Her CXR showed clear lungs. She was given cardizem 60mg PO x1 and mexiletine 200mg PO x1. She was admitted to medicine on telemetry.  (02 Jun 2021 02:15)    On my exam, patient appeared clinically and hemodynamically stable.  No new complaints  Telemetry: sinus bradycardia to sinus rhythm, intermittent PVCs, NSVT  BPs under acceptable control on current regimen  Patient current on mexiletine as per EP recommendations; anticipate no change in regimen but will defer to EP team.  No evidence of KENZIE on renal artery US  No further cardiac testing needed at this time.  No objection to discharge from our perspective with f/u in the office within 2 weeks.
This is a 70F with history as above who presents to the hospital with complaints of palpitations with associated lightheadedness and SOB.     Patient known to me from the office (2 recent office visits).    Patient is a 71 yo woman with Tracheobronchomalasia s/p Tracheobronchoplasty, Bronchiectasis, Severe Allergic Asthma, Adrenal Insuffiencey, DM2, HTN, Colorectal cancer s/p partial colectomy now with colostomy, PAF on Eliquis, and reecntly diagnosed Bigeminy who presents to the hospital with complaints of palpitations with associated SOB and lightheadedness on Tuesday morning. Patient was recently hospitalized at [facility] 4/20-4/29 for palpitations/SOB and was found to have PVCs/bigeminy. EP team started her on mexiletine with initial improvement of symptoms and she was discharged home.  Said that she was doing well at home but yesterday morning, noted having an episode of palpitations with associated SOB and lightheadedness.    She called our office with reports of her symptoms.  As symptoms were intolerable, she presented to an [facility] which subsequently advised that she go to ED because of an abnormal EKG.     In the ED, when evaluated by the Medicine attending, she reported feeling well, and denied having any further palpitations.  She also denied having any other associated cardiac complaints including chest pain, SOB, lightheadedness/syncope.     On arrival to the hospital, her vitals were T 97.6, P 78, /50, RR 18, O2 sat 100% RA. Her lab work did not show any significant abnormalities and her trops were stable at 17 (slightly lower than prior levels). Her CXR showed clear lungs. She was given cardizem 60mg PO x1 and mexiletine 200mg PO x1. She was admitted to medicine on telemetry.  (02 Jun 2021 02:15)    On my exam, patient appeared clinically and hemodynamically stable.  No new complaints  Telemetry: sinus bradycardia to sinus rhythm, intermittent PVCs, NSVT  BPs under acceptable control on current regimen  Patient current on mexiletine as per EP recommendations; anticipate no change in regimen but will defer to EP team.  No evidence of KENZIE on renal artery US  No further cardiac testing needed at this time.  No objection to discharge from our perspective with f/u in the office within 2 weeks.
70 year old Female with PMH of pAFib (on Eliquis and Cardizem), tracheobronchomalacia s/p tracheobronchoplasty, lung nodules, severe allergic asthma, adrenal insufficiency, T2DM, HTN, Colon CA s/p colostomy. Patient presented to [facility] ED with c/o worsening SOB/palpitations, symptoms are similar to recent  April admission when she was evaluated/treated  by Dr. Aragon. She was started on Mexiletine and monitored closely during hospital stay. Follows with Pj Glover (5/27) and Mahesh 5/13) on an outpatient basis.        EP Consult requested for PVC re-evaluation on 6/3. PVC burden is less at this time  Continue Mexiletine 200mg TID  Cardizem 60mg twice daily    CHADVASC Score= 4     Eliquis 5mg twice daily   Monitor lytes and replete K>4.0 and Mg>2.0.   Monitor TSH and LFTs  Appreciate Cardiology Recommendations  Follow up with Pulmonology (Dr. Allison)  Management per Primary Team  May d/c home from EP standpoint  F/u with Dr. Aragon as outpatient
This is a 70F with history as above who presents to the hospital with complaints of palpitations with associated lightheadedness and SOB.     Patient known to me from the office (2 recent office visits).    Patient is a 69 yo woman with Tracheobronchomalasia s/p Tracheobronchoplasty, Bronchiectasis, Severe Allergic Asthma, Adrenal Insuffiencey, DM2, HTN, Colorectal cancer s/p partial colectomy now with colostomy, PAF on Eliquis, and reecntly diagnosed Bigeminy who presents to the hospital with complaints of palpitations with associated SOB and lightheadedness on Tuesday morning. Patient was recently hospitalized at [facility] 4/20-4/29 for palpitations/SOB and was found to have PVCs/bigeminy. EP team started her on mexiletine with initial improvement of symptoms and she was discharged home.  Said that she was doing well at home but yesterday morning, noted having an episode of palpitations with associated SOB and lightheadedness.    She called our office with reports of her symptoms.  As symptoms were intolerable, she presented to an [facility] which subsequently advised that she go to ED because of an abnormal EKG.     In the ED, when evaluated by the Medicine attending, she reported feeling well, and denied having any further palpitations.  She also denied having any other associated cardiac complaints including chest pain, SOB, lightheadedness/syncope.     On arrival to the hospital, her vitals were T 97.6, P 78, /50, RR 18, O2 sat 100% RA. Her lab work did not show any significant abnormalities and her trops were stable at 17 (slightly lower than prior levels). Her CXR showed clear lungs. She was given cardizem 60mg PO x1 and mexiletine 200mg PO x1. She was admitted to medicine on telemetry.  (02 Jun 2021 02:15)    On my exam, patient appeared clinically and hemodynamically stable.  No new complaints  Telemetry: sinus bradycardia to sinus rhythm, intermittent PVCs, 4b NSVT  BPs under acceptable control on current regimen    On my exam, patient appeared clinically and hemodynamically stable.  Patient current on mexiletine as per EP recommendations; anticipate no change in regimen but will defer to EP team.    No further cardiac testing needed at this time.  No objection to discharge from our perspective with f/u in the office within 2 weeks.
This is a 70F with history as above who presents to the hospital with complaints of palpitations with associated lightheadedness and SOB. 
This is a 70F with history as above who presents to the hospital with complaints of palpitations with associated lightheadedness and SOB.     Patient known to me from the office (2 recent office visits).    Patient is a 69 yo woman with Tracheobronchomalasia s/p Tracheobronchoplasty, Bronchiectasis, Severe Allergic Asthma, Adrenal Insuffiencey, DM2, HTN, Colorectal cancer s/p partial colectomy now with colostomy, PAF on Eliquis, and reecntly diagnosed Bigeminy who presents to the hospital with complaints of palpitations with associated SOB and lightheadedness on Tuesday morning. Patient was recently hospitalized at [facility] 4/20-4/29 for palpitations/SOB and was found to have PVCs/bigeminy. EP team started her on mexiletine with initial improvement of symptoms and she was discharged home.  Said that she was doing well at home but yesterday morning, noted having an episode of palpitations with associated SOB and lightheadedness.    She called our office with reports of her symptoms.  As symptoms were intolerable, she presented to an [facility] which subsequently advised that she go to ED because of an abnormal EKG.     In the ED, when evaluated by the Medicine attending, she reported feeling well, and denied having any further palpitations.  She also denied having any other associated cardiac complaints including chest pain, SOB, lightheadedness/syncope.     On arrival to the hospital, her vitals were T 97.6, P 78, /50, RR 18, O2 sat 100% RA. Her lab work did not show any significant abnormalities and her trops were stable at 17 (slightly lower than prior levels). Her CXR showed clear lungs. She was given cardizem 60mg PO x1 and mexiletine 200mg PO x1. She was admitted to medicine on telemetry.  (02 Jun 2021 02:15)    On my exam, patient appeared clinically and hemodynamically stable.  No new complaints  Telemetry: sinus bradycardia to sinus rhythm, intermittent PVCs, 4b NSVT  BPs under acceptable control on current regimen    On my exam, patient appeared clinically and hemodynamically stable.  Patient current on mexiletine as per EP recommendations; anticipate no change in regimen but will defer to EP team.    No further cardiac testing needed at this time.  No objection to discharge from our perspective with f/u in the office within 2 weeks.
This is a 70F with history as above who presents to the hospital with complaints of palpitations with associated lightheadedness and SOB.

## 2021-06-07 NOTE — DISCHARGE NOTE NURSING/CASE MANAGEMENT/SOCIAL WORK - PATIENT PORTAL LINK FT
You can access the FollowMyHealth Patient Portal offered by [facility] by registering at the following website: [email or web address]. By joining [facility]’s FollowMyHealth portal, you will also be able to view your health information using other applications (apps) compatible with our system.

## 2021-06-07 NOTE — PROGRESS NOTE ADULT - PROVIDER SPECIALTY LIST ADULT
Electrophysiology
Electrophysiology
Vascular Cardiology
Cardiology
Cardiology
Hospitalist
Hospitalist
Vascular Cardiology
Hospitalist
Cardiology
Hospitalist
Hospitalist

## 2021-06-07 NOTE — PROGRESS NOTE ADULT - PROBLEM SELECTOR PROBLEM 2
Tracheobronchomalacia

## 2021-06-07 NOTE — PROGRESS NOTE ADULT - PROBLEM SELECTOR PROBLEM 6
Type 2 diabetes mellitus without complication, with long-term current use of insulin

## 2021-06-07 NOTE — PROGRESS NOTE ADULT - ATTENDING COMMENTS
70 year old Female  with PMH of pAFib (on Eliquis and Cardizem), tracheobronchomalacia s/p tracheobronchoplasty, lung nodules, severe allergic asthma, adrenal insufficiency, T2DM, HTN, Colon CA s/p colostomy. Patient presented to [facility] ED with c/o worsening SOB/palpitations, symptoms are similar to recent  April admission when she was evaluated/treated for PVCs with mexelitine. No frequent PVCs. HTN ramirez undergoing. Will follow.
70 year old Female  with PMH of pAFib (on Eliquis and Cardizem), tracheobronchomalacia s/p tracheobronchoplasty, lung nodules, severe allergic asthma, adrenal insufficiency, T2DM, HTN, Colon CA s/p colostomy. Patient presented to [facility] ED with c/o worsening SOB/palpitations, symptoms are similar to recent  April admission when she was evaluated/treated for PVCs with mexelitine. No frequent PVCs. HTN ramirez undergoing. Will follow.

## 2021-06-07 NOTE — PROGRESS NOTE ADULT - REASON FOR ADMISSION
Palpitations with associated SOB and lightheadedness

## 2021-06-08 ENCOUNTER — NON-APPOINTMENT (OUTPATIENT)
Age: 71
End: 2021-06-08

## 2021-06-15 ENCOUNTER — NON-APPOINTMENT (OUTPATIENT)
Age: 71
End: 2021-06-15

## 2021-06-15 ENCOUNTER — RX RENEWAL (OUTPATIENT)
Age: 71
End: 2021-06-15

## 2021-06-21 ENCOUNTER — NON-APPOINTMENT (OUTPATIENT)
Age: 71
End: 2021-06-21

## 2021-06-22 ENCOUNTER — APPOINTMENT (OUTPATIENT)
Dept: PULMONOLOGY | Facility: CLINIC | Age: 71
End: 2021-06-22

## 2021-06-22 NOTE — PHYSICAL EXAM
[No Acute Distress] : no acute distress [Normal Oropharynx] : normal oropharynx [III] : Mallampati Class: III [Normal Appearance] : normal appearance [No Neck Mass] : no neck mass [Normal Rate/Rhythm] : normal rate/rhythm [Normal S1, S2] : normal s1, s2 [No Murmurs] : no murmurs [No Resp Distress] : no resp distress [Clear to Auscultation Bilaterally] : clear to auscultation bilaterally [No Abnormalities] : no abnormalities [Kyphosis] : kyphosis [Benign] : benign [Normal Gait] : normal gait [No Clubbing] : no clubbing [No Cyanosis] : no cyanosis [No Edema] : no edema [FROM] : FROM [Normal Color/ Pigmentation] : normal color/ pigmentation [No Focal Deficits] : no focal deficits [Oriented x3] : oriented x3 [Normal Affect] : normal affect [TextBox_54] : 2/6 systolic murmur [TextBox_68] : I:E 1:3; Clear  [TextBox_80] : kyphotic

## 2021-06-22 NOTE — ADDENDUM
[FreeTextEntry1] : Documented by Jesse Murphy acting as a scribe for Dr. Eulalio Allison on (06/22/2021).\par \par All medical record entries made by the Scribe were at my, Dr. Eulalio Allison's, direction and personally dictated by me on (06/22/2021). I have reviewed the chart and agree that the record accurately reflects my personal performance of the history, physical exam, assessment and plan. I have also personally directed, reviewed, and agree with the discharge instructions.\par

## 2021-06-22 NOTE — HISTORY OF PRESENT ILLNESS
[FreeTextEntry1] : Ms. Bloom is a 70 year old female with a history of abnormal CXR/chest CT, allergic rhinitis, severe persistent asthma, bronchiectasis, GERD, COPD, recurrent PNA, PND, s/p tracheoplasty, TBM, and SOB presenting to the office today for a follow up visit. Her chief complaint is SOB\par -she notes recently being at [facility] and discharged on 4/29\par -she notes being hospitalized for 10 days\par -she notes she feels better when she bends over\par -she notes if she talks too much she will be SOB\par -she notes coughing since 5/2/2021\par -she notes the cough is from her heart not lungs\par -she notes a sore throat and has been taking cough drops which have irritated her throat\par -she notes her  bowels are regular\par -she notes her sense of smell and taste are normal\par -she notes feeling like she is going to pass out\par -she notes feeling lightheaded and dizzy\par -she notes her stool is dark\par -she notes seeing an electrophysiologist Dr. Zoya Aragon and told him it feels like there is a band around her waist\par -she notes he told her the cause is from her left ventricle and she is not a candidate for surgery\par -she notes being on Eliquis\par -she notes having PVC bigeminy and was given mexiletine\par -she notes her potassium has been fluctuating\par \par patient denies any headaches, nausea, vomiting, fever, chills, sweats, chest pain, chest pressure, palpitations, wheezing, fatigue, diarrhea, constipation, dysphagia, myalgias, leg swelling, leg pain, itchy eyes, itchy ears, heartburn, reflux or sour taste in the mouth  [Home] : at home, [unfilled] , at the time of the visit. [Medical Office: ([facility])___] : at the medical office located in  [Verbal consent obtained from patient] : the patient, [unfilled]

## 2021-06-23 ENCOUNTER — APPOINTMENT (OUTPATIENT)
Dept: PULMONOLOGY | Facility: CLINIC | Age: 71
End: 2021-06-23
Payer: MEDICARE

## 2021-06-23 VITALS — WEIGHT: 137 LBS | HEIGHT: 67 IN | BODY MASS INDEX: 21.5 KG/M2

## 2021-06-23 PROCEDURE — 99214 OFFICE O/P EST MOD 30 MIN: CPT | Mod: 95

## 2021-06-23 NOTE — ASSESSMENT
[FreeTextEntry1] : Ms. Bloom is a 70 year old female with a history of mm, rectal CA, AVDz, asthma, allergy, GERD, TBM, s/p multiple pneumonias, who video calls with intermittent SOB. (But "this is not her asthma") c/w arrhythmia - no evidence of asthma/PE, -suffering with "shingles."\par \par Her chronic SOB which is multifactorial due to:\par - tracheomalacia (s/p tracheoplasty with residual frequent mucus production, though patient is non-compliant with vest therapy)\par - chronic bronchitis\par - asthma\par - allergic rhinitis\par - GERD\par - poor breathing mechanics \par - CAD/AV disease, arrhythmia, electrolyte issue\par \par \par problem 1a: severe persistent asthma -(steroid dependent) - Stable\par -continue Medrol 4 mg qd\par -continue to use albuterol via the nebulizer QID \par -followed by Mucomyst QiD\par -followed by the acapella device/ chest vest therapy \par -(1st) followed by Perforomist via the nebulizer BID\par -(2nd) followed by Budesonide 0.5% via the nebulizer BID \par -continue to use Breo Ellipta 200 at 1 inhalation QD \par -continue to use Spiriva 1 inhalation QD\par -failed Xolair 225 injection; follow up injections every 2 weeks - Dupixent initiated (12.3.19) - to continue 300 every 2 weeks. \par -continue to use Accolate 20 mg BID\par -Continue Daliresp 250 mg QD \par -Information sheet given about prednisone to the patient to be reviewed, this medication is never to be used without consulting the prescribing physician. Proper dietary restraint is necessary specifically salt containing foods, if any reaction may occur should be reported. \par -Asthma is believed to be caused by inherited (genetic) and environmental factor, but its exact cause is unknown. Asthma may be triggered by allergens, lung infections, or irritants in the air. Asthma triggers are different for each person\par -Inhaler technique reviewed as well as oral hygiene techniques reviewed with patient. Avoidance of cold air, extremes of temperature, rescue inhaler should be used before exercise. Order of medication reviewed with patient. Recommended use of a cool mist humidifier in the bedroom. \par \par problem 1B: ?electrolyte issue/anemia\par -Review SMAIS, Magnesium levels\par -Complete CBC and iron studies\par \par problem 2: tracheomalacia, residual bronchomalacia \par -s/p tracheoplasty with Dr. Mt Zapien\par -laser Bronchoscopy pending\par -continue to follow up \par -s/p f/u Dynamic chest CT 10/2019 positive w TBM - repeat \par \par problem 3: chronic bronchitis and mucus clearance\par -continue to use acapella device multiple times daily\par -recommended to use chest vest therapy multiple times daily \par -she is being sent for sputum cultures \par Patient has a chronic cough greater than 6 months, tried and failed manual chest physiotherapy at home, no skilled caregiver available at home to perform manual CPT, tried and failed acapella vibratory physiotherapy, and recommended chest vest therapy \par \par Problem 3A: Multiple infections\par -off Tobramycin BID for 1 month (completed 12/20/19)\par -Complete follow up Sputum culture after completing ABx if needed. \par \par Problem 3B: multi myeloma\par -appointment  on 1/28/2020\par \par problem 4: GERD\par -continue to use Protonix 40 mg before breakfast\par -continue Baclofen 10 mg q-meal\par -Rule of 2s: avoid eating too much, eating too late, eating too spicy, eating two hours before bed\par -Things to avoid including overeating, spicy foods, tight clothing, eating within three hours of bed, this list is not all inclusive. \par -For treatment of reflux, possible options discussed including diet control, H2 blockers, PPIs, as well as coating motility agents discussed as treatment options. Timing of meals and proximity of last meal to sleep were discussed. If symptoms persist, a formal gastrointestinal evaluation is needed. \par \par problem 5: allergic rhinitis \par -continue to use nasal saline\par -continue to use Xyzal 5 mg before bed\par -Environmental measures for allergies were encouraged including mattress and pillow cover, air purifier, and environmental controls. \par \par problem 6: hx of abnormal aortic valve / cardiac health - arrhythmia \par -continue to follow up with Dr. Leahy, AV Disease, AF\par -echocardiogram in June 2018  (Nadia; Kale Tristan for f/u, Ismail\par \par problem 7: colon cancer\par -s/p radiation therapy, surgery \par -continue to use chemotherapy follow up with Dr. King or Dr. Mario\par \par problem 8: poor breathing mechanics\par -Proper breathing techniques were reviewed with an emphasis of exhalation. Patient instructed to breath in for 1 second and out for four seconds. Patient was encouraged to not talk while walking.\par \par problem 9: immunodeficiency\par - Due to the fact that this pt has had more infections than would be expected and immunological blood work is indicated this would include: IgG subclasses, quantitative immunoglobulins, Strep pneumoniae titers as well as Vitamin D levels. Based on this blood work we will be able to decide where the pt needs additional pneumococcal vaccine either Prevnar 13 or pneumovax. Immunology evaluation will also be potentially indicated.\par \par problem 10: r/o immunodeficiency (on Medrol)\par -Due to the fact that this pt has had more infections than would be expected and immunological blood work is indicated this would include: IgG subclasses, quantitative immunoglobulins, Strep pneumoniae titers as well as Vitamin D levels.\par -Based on this blood work we will be able to decide where the pt needs additional pneumococcal vaccine either Prevnar 13 or pneumovax. Immunology evaluation will also be potentially indicated. \par \par problem 11: abnormal chest CT- ? new nodule- likely inflammation \par - F/u PET/CT 4/2019- if changed Bx (Rupali); follow up and re-evaluate as per Rupali\par -questionable DIEUDONNE or HERMELINDO, all sputum is negative \par -CAT scans are the only radiological modality to identify abnormalities w/in the lungs with regards to nodules/masses/lymph nodes. Risks, benefits were reviewed in detail. The guidelines for abnormalities include follow up CT scans at various intervals which could range from 6 weeks to 1 year intervals. If there is a change for the worse then consideration for a biopsy will be considered if you are a candidate. Second opinion evaluation with a thoracic surgeon or an interventional radiologist could be offered. 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 It sometimes develops following a URI, herpes zoster outbreak in pharynx or thyroid or cervical spine injury. However, many patients have no identifiable antecedent explanation. \par \par Problem 14: Health Maintenance/COVID19 Precautions \par -s/p  Moderna COVID 19 vaccine x 2\par - Clean your hands often. Wash your hands often with soap and water for at least 20 seconds, especially after blowing your nose, coughing, or sneezing, or having been in a public place.\par - If soap and water are not available, use a hand  that contains at least 60% alcohol.\par - To the extent possible, avoid touching high-touch surfaces in public places - elevator buttons, door handles, handrails, handshaking with people, etc. Use a tissue or your sleeve to cover your hand or finger if you must touch something.\par - Wash your hands after touching surfaces in public places.\par - Avoid touching your face, nose, eyes, etc.\par - Clean and disinfect your home to remove germs: practice routine cleaning of frequently touched surfaces (for example: tables, doorknobs, light switches, handles, desks, toilets, faucets, sinks & cell phones)\par - Avoid crowds, especially in poorly ventilated spaces. Your risk of exposure to respiratory viruses like COVID-19 may increase in crowded, closed-in settings with little air circulation if there are people in the crowd who are sick. All patients are recommended to practice social distancing and stay at least 6 feet away from others. \par - Avoid all non-essential travel including plane trips, and especially avoid embarking on cruise ships.\par -If COVID-19 is spreading in your community, take extra measures to put distance between yourself and other people to further reduce your risk of being exposed to this new virus.\par -Stay home as much as possible.\par - Consider ways of getting food brought to your house through family, social, or commercial networks\par -Be aware that the virus has been known to live in the air up to 3 hours post exposure, cardboard up to 24 hours post exposure, copper up to 4 hours post exposure, steel and plastic up to 2-3 days post exposure. Risk of transmission from these surfaces are affected by many variables.\par COVID-19 precautionary Immune Support Recommendations:\par -OTC Vitamin C 500mg BID \par -OTC Quercetin 250-500mg BID \par -OTC Zinc 75-100mg per day \par -OTC Melatonin 1 or 2mg a night \par -OTC Vitamin D 1-4000mg per day \par -OTC Tonic Water 8oz per day\par -Water 0.5-1 gallon per day\par Asthma and COVID19:\par You need to make sure your asthma is under control. This often requires the use of inhaled corticosteroids (and sometimes oral corticosteroids). Inhaled corticosteroids do not likely reduce your immune system’s ability to fight infections, but oral corticosteroids may. It is important to use the steps above to protect yourself to limit your exposure to any respiratory virus. \par \par problem 15:  health maintenance \par -s/p flu shot 10/30/2020\par -6/2021 Shingeles: Valacyclovir in progress \par -recommended strep pneumonia vaccines: Prevnar-13 vaccine, followed by Pneumo vaccine 23 one year following\par -recommended early intervention for URIs\par -recommended regular osteoporosis evaluations\par -recommended early dermatological evaluations\par -recommended after the age of 50 to the age of 70, colonoscopy every 5 years \par \par F/U in 6 weeks - SPI / NIOX\par She is encouraged to call with any changes, concerns, or questions.

## 2021-06-23 NOTE — HISTORY OF PRESENT ILLNESS
[Home] : at home, [unfilled] , at the time of the visit. [Medical Office: ([facility])___] : at the medical office located in  [Verbal consent obtained from patient] : the patient, [unfilled] [FreeTextEntry1] : Ms. Bloom is a 70 year old female with a history of abnormal CXR/chest CT, allergic rhinitis, severe persistent asthma, bronchiectasis, GERD, COPD, recurrent PNA, PND, s/p tracheoplasty, TBM, and SOB video calling to the office today for a follow up visit. Her chief complaint is\par \par -she notes uncontrolled itch onset 6/18/21 localized to shoulder and armpit worsening in severity, not improved with Benadryl, and diagnosed as Shingles at Urgent Care\par -she notes anxiety over contact with others despite COVID 19 vaccine \par -she notes port has not been flushed due to anxiety about going to cancer center's office with crowds\par -she notes breathing generally stable\par -she notes mild intermittent cough to clear exacerbated by PND\par -she denies wheeze\par \par \par -denies any chest pain, chest pressure, diarrhea, constipation, dysphagia, sour taste in the mouth, dizziness, leg swelling, leg pain, myalgias, arthralgias, itchy eyes, itchy ears, heartburn, or reflux.\par \par

## 2021-06-23 NOTE — ADDENDUM
[FreeTextEntry1] : Documented by Kristian Clark acting as a scribe for Dr. Eulalio Allison on 06/23/2021.\par \par All medical record entries made by the Scribe were at my, Dr. Eulalio Allison's, direction and personally dictated by me on 06/23/2021 . I have reviewed the chart and agree that the record accurately reflects my personal performance of the history, physical exam, assessment and plan. I have also personally directed, reviewed, and agree with the discharge instructions. \par

## 2021-06-24 ENCOUNTER — APPOINTMENT (OUTPATIENT)
Dept: CARDIOLOGY | Facility: CLINIC | Age: 71
End: 2021-06-24

## 2021-06-24 ENCOUNTER — APPOINTMENT (OUTPATIENT)
Dept: ELECTROPHYSIOLOGY | Facility: CLINIC | Age: 71
End: 2021-06-24

## 2021-06-27 NOTE — H&P ADULT - PROBLEM SELECTOR PLAN 4
-unclear etiology  -c/w home methylprednisolone 4mg daily and pantoprazole for GI ppx 40mg daily aerobic capacity/endurance/gait, locomotion, and balance

## 2021-06-29 NOTE — PROGRESS NOTE ADULT - ATTENDING COMMENTS
Tulio Barreto
Hospitalist- Adam Boothe MD  Pager: [phone]  If no response or off-hours, page [phone]  -------------------------------------  I personally performed the E/M service provided and was physically present during key portions of the service furnished by the resident/fellow. I agree with the history, physical and assessment/plan as stated above.     total discharge time: 35 minutes.
increased/new sputum production.  dyspnea with ambulation  wheezing with cough.  patient requesting short course of iv steroids.  consider short course abx for acute bronchitis.  adjust insulin dose daily as appropriate based on glucose levels.
overall stable with mild improvement.  acute viral bronchitis - supportive care  acute asthma exacerbation - steroid taper, nebs  constipation/ostomy status: add bowel regimen.  home hopefully in 1-2 days.  consider CXR as BS slightly diminished on right with some rhonchi
we discuss discharge planning with patient everyday.  initially she was agreeable to go today, but now she seems very concerned about the dark color of per sputum.  I reassured her that the color of sputum is not a direct marker of prognosis.  day 3/5 Levsary.  agreeable to go home 9/3; if does not please consider 24-hr discharge notice.  steroids, nebs  encouraged ambulation
clinically stable.  transition to PO steroids tomorrow;   last day of abx tomorrow  agreeable to discharge home tomorrow with outpt pulm f/up
overall improved.  agreeable to go home Sunday.  encouraged ambulation.  transition to oral steroids within 24-48 hours.  good air movement but symptoms worse with coughing and ambulation.
Self
extensive pulmonary history with multiple prior intubations starting in her teens.  tracheo-bronchomalacia s/p plasty.  currently 30% improved since admission.  component of anxiety/impending doom understandable given multiple prior intubations and known TBM.  appreciate cardio-pulm input.  steroid taper, nebs.  monitor closely.

## 2021-06-30 ENCOUNTER — OUTPATIENT (OUTPATIENT)
Dept: OUTPATIENT SERVICES | Facility: HOSPITAL | Age: 71
LOS: 1 days | Discharge: HOME | End: 2021-06-30

## 2021-06-30 DIAGNOSIS — Z98.89 OTHER SPECIFIED POSTPROCEDURAL STATES: Chronic | ICD-10-CM

## 2021-06-30 DIAGNOSIS — Z87.09 PERSONAL HISTORY OF OTHER DISEASES OF THE RESPIRATORY SYSTEM: Chronic | ICD-10-CM

## 2021-06-30 DIAGNOSIS — K62.5 HEMORRHAGE OF ANUS AND RECTUM: Chronic | ICD-10-CM

## 2021-06-30 DIAGNOSIS — Z98.890 OTHER SPECIFIED POSTPROCEDURAL STATES: Chronic | ICD-10-CM

## 2021-06-30 DIAGNOSIS — C18.9 MALIGNANT NEOPLASM OF COLON, UNSPECIFIED: ICD-10-CM

## 2021-06-30 DIAGNOSIS — H05.352 EXOSTOSIS OF LEFT ORBIT: Chronic | ICD-10-CM

## 2021-06-30 DIAGNOSIS — Z96.653 PRESENCE OF ARTIFICIAL KNEE JOINT, BILATERAL: Chronic | ICD-10-CM

## 2021-07-02 ENCOUNTER — LABORATORY RESULT (OUTPATIENT)
Age: 71
End: 2021-07-02

## 2021-07-02 ENCOUNTER — RESULT REVIEW (OUTPATIENT)
Age: 71
End: 2021-07-02

## 2021-07-02 ENCOUNTER — APPOINTMENT (OUTPATIENT)
Dept: HEMATOLOGY ONCOLOGY | Facility: CLINIC | Age: 71
End: 2021-07-02
Payer: MEDICARE

## 2021-07-02 ENCOUNTER — APPOINTMENT (OUTPATIENT)
Dept: INFUSION THERAPY | Facility: HOSPITAL | Age: 71
End: 2021-07-02

## 2021-07-02 ENCOUNTER — APPOINTMENT (OUTPATIENT)
Dept: HEMATOLOGY ONCOLOGY | Facility: CLINIC | Age: 71
End: 2021-07-02

## 2021-07-02 VITALS
WEIGHT: 145.94 LBS | HEART RATE: 67 BPM | TEMPERATURE: 97.3 F | HEIGHT: 67.01 IN | RESPIRATION RATE: 16 BRPM | SYSTOLIC BLOOD PRESSURE: 125 MMHG | OXYGEN SATURATION: 95 % | BODY MASS INDEX: 22.91 KG/M2 | DIASTOLIC BLOOD PRESSURE: 75 MMHG

## 2021-07-02 LAB
ALBUMIN SERPL ELPH-MCNC: 4.3 G/DL
ALP BLD-CCNC: 91 U/L
ALT SERPL-CCNC: 14 U/L
ANION GAP SERPL CALC-SCNC: 13 MMOL/L
AST SERPL-CCNC: 15 U/L
BASOPHILS # BLD AUTO: 0.02 K/UL — SIGNIFICANT CHANGE UP (ref 0–0.2)
BASOPHILS NFR BLD AUTO: 0.2 % — SIGNIFICANT CHANGE UP (ref 0–2)
BILIRUB SERPL-MCNC: 0.2 MG/DL
BUN SERPL-MCNC: 15 MG/DL
CALCIUM SERPL-MCNC: 9.1 MG/DL
CEA SERPL-MCNC: 3 NG/ML
CHLORIDE SERPL-SCNC: 101 MMOL/L
CO2 SERPL-SCNC: 25 MMOL/L
CREAT SERPL-MCNC: 0.83 MG/DL
EOSINOPHIL # BLD AUTO: 0.04 K/UL — SIGNIFICANT CHANGE UP (ref 0–0.5)
EOSINOPHIL NFR BLD AUTO: 0.4 % — SIGNIFICANT CHANGE UP (ref 0–6)
ESTIMATED AVERAGE GLUCOSE: 169 MG/DL
GLUCOSE SERPL-MCNC: 258 MG/DL
HBA1C MFR BLD HPLC: 7.5 %
HCT VFR BLD CALC: 41.6 % — SIGNIFICANT CHANGE UP (ref 34.5–45)
HGB BLD-MCNC: 12.5 G/DL — SIGNIFICANT CHANGE UP (ref 11.5–15.5)
IMM GRANULOCYTES NFR BLD AUTO: 0.5 % — SIGNIFICANT CHANGE UP (ref 0–1.5)
LDH SERPL-CCNC: 217 U/L
LYMPHOCYTES # BLD AUTO: 0.76 K/UL — LOW (ref 1–3.3)
LYMPHOCYTES # BLD AUTO: 8.3 % — LOW (ref 13–44)
MCHC RBC-ENTMCNC: 23.1 PG — LOW (ref 27–34)
MCHC RBC-ENTMCNC: 30 G/DL — LOW (ref 32–36)
MCV RBC AUTO: 76.9 FL — LOW (ref 80–100)
MONOCYTES # BLD AUTO: 0.41 K/UL — SIGNIFICANT CHANGE UP (ref 0–0.9)
MONOCYTES NFR BLD AUTO: 4.5 % — SIGNIFICANT CHANGE UP (ref 2–14)
NEUTROPHILS # BLD AUTO: 7.88 K/UL — HIGH (ref 1.8–7.4)
NEUTROPHILS NFR BLD AUTO: 86.1 % — HIGH (ref 43–77)
NRBC # BLD: 0 /100 WBCS — SIGNIFICANT CHANGE UP (ref 0–0)
PLATELET # BLD AUTO: 258 K/UL — SIGNIFICANT CHANGE UP (ref 150–400)
POTASSIUM SERPL-SCNC: 4.6 MMOL/L
PROT SERPL-MCNC: 6.5 G/DL
RBC # BLD: 5.41 M/UL — HIGH (ref 3.8–5.2)
RBC # FLD: 17.9 % — HIGH (ref 10.3–14.5)
SODIUM SERPL-SCNC: 139 MMOL/L
WBC # BLD: 9.16 K/UL — SIGNIFICANT CHANGE UP (ref 3.8–10.5)
WBC # FLD AUTO: 9.16 K/UL — SIGNIFICANT CHANGE UP (ref 3.8–10.5)

## 2021-07-02 PROCEDURE — 99214 OFFICE O/P EST MOD 30 MIN: CPT

## 2021-07-02 RX ORDER — METHYLPREDNISOLONE 8 MG/1
8 TABLET ORAL
Qty: 100 | Refills: 1 | Status: DISCONTINUED | COMMUNITY
Start: 2019-09-12 | End: 2021-07-02

## 2021-07-02 NOTE — HISTORY OF PRESENT ILLNESS
[de-identified] : Patient developed bleeding AUG 2016 from rectum. A colonoscopy in MAR 2016 was unrevealing. It was thought to be a hemorrhoid in the past. Her HGB began to drop. She was examined She called PCP and referred to GI surgeon (Dr. Magallanes). A mass was found and biopsy showed anal cancer, squamous cell. A PET scan did not reveal any local or metastatic disease. She received Mitomycin on 5/3/2017 (dose #2 mitomycin on 6/1/2017) and Xeloda. Radiation 5/3/17 to 6/16/17 at 5400 cGy. \par \par Patient also with h/o adrenal insufficiency after 50 years of steroids for asthma and tracheomalacia (mesh placed 10/2016, Dr. Mcclellan at [facility]). She has diabetes that requires insulin, without sequelae on the kidney or eye. 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 Will continue to monitor. \par Breast screening: She had breast mammogram and US in 2017 found 2 lesions, and both benign. Rt breast - "benign, nonproliferative fibrocystic changes"; Lt Breast - "fibroadenoma".  Due for repeat 2/2018.\par Cardiac: Will be undergoing SAFIA (Dr. Leahy) for re-evaluation of aortic valve, and bronchoscopy (Dr. Mcclellan). Her valve showed moderate to severe AR.\par \par 5/29/2018: Patient has local relapse in anus proved by biopsy in February 2018. It is again SCC and HPV / p16 is positive. PET 1/2018 showed no metastatic disease. Colonoscopy 2/2018 showed no colonic lesion, only anal mass that is palpable by ARIAS. Patient is deemed not a surgical candidate for APR due to pulmonary and cardiac risk. She completed second course of radiotherapy in early 4/2018 with Dr. Amanda Burger. She was recently hospitalized for high fever. No clear source - blood and urine cultures were negative. The CAT scan showed minor opacities that were not consistent with pnuemonia. She was admitted for 5 days and given IV antibiotics empirically. She now has weakness in legs and uses walker for balance. She will be getting a home rehabilitation visit soon. Does feel lightheaded at times.\par \par 8/22/2019:\par 1: Anal Cancer: Patient underwent APR surgery on 7/24/2018. Pathology post-operatively is [id number]; squamous cell cancer. The tissue is healed.  She is due to repeat colonoscopy. \par 2: Lung nodule: She had new lung nodule on right hemidiaphragm that is 1 cm and not FDG avid. She has a second 1 cm nodule on the RML that is subplueral and has mild FDG. Repeat CAT 8/13/19 shows that there is slight improvement of RLL nodule. She saw Dr. Zapien and he agrees to continue to surveillance.\par 3: Cardiac: Undergoing evaluation for possible pulmonary HTN. She also has aortic regurgitation. She has baseline COPD.\par 4: Social: She lives with her sister. She is doing volunteer work, and helps in soup kitchen.\par 5: Pulmonary - using nebulizers daily and less cough.\par \par 8/26/2020:\par 1) Anal CA: She recently underwent colonoscopy on 8/13/2020 with Dr. Hall which demonstrated a polyp in the transverse colon.   Pathology demonstrated tubular adenoma.\par She is here in the office today because concerns of a new lesion located on anus which is ~ 0.8 x 0.8 cm.\par She denies fever, chills, abdominal pain, change in bowel habits, weight loss or increase in fatigue.\par Patient has follow up with Dr. Luong next week.\par 2) Pulmonary: Continues to see Dr. Allison for tracheomalacia. \par 3) Social: Patient currently lives at home with her sister.\par \par 7/2/21\par PAtient has recently developed PVC and bigeminy. Started on mexilitine\par Had recent Shingles. Will need vaccine in the Fall with Shingrix.\par HAs not had imaging for anal cancer since 8/2020 - needs followup. Also, is allergic to contrast. [de-identified] : Pulmonary: Eulalio Allison  [phone]\par GI surgeon: Terrell Luong; [phone]\par PCP: Anastasiya Jin [phone] \par Cardiologist: Steven Leahy [phone]\par Radiation Oncology: Amanda Burger and Allie Mart\par Colonoscopy: Rafael\par Wound: Huma Gray [phone]\par \par [facility] doctors: \par PCP/Cardiology: Jordi Engel\par Thoracic Surgeon: Zohaib Zapien

## 2021-07-02 NOTE — PHYSICAL EXAM
[Ambulatory and capable of all self care but unable to carry out any work activities] : Status 2- Ambulatory and capable of all self care but unable to carry out any work activities. Up and about more than 50% of waking hours [Thin] : thin [Normal] : grossly intact [de-identified] : Left eye enucleation [de-identified] : mild wheeze; upper airway sounds transmitted.  [FreeTextEntry1] : Stoma is functioning well. Small opening at anal site, and no discharge.  Small 0.8 cm X 0.8 cm raised lesion proximal to anal canal located ~ 3-4 cm proximal to anal canal. [de-identified] : non-focal

## 2021-07-02 NOTE — REVIEW OF SYSTEMS
[Negative] : Allergic/Immunologic [FreeTextEntry6] : chronic cough - has been better [FreeTextEntry8] : burning [de-identified] : She walks without cane; gait off balance.

## 2021-07-03 LAB
DEPRECATED KAPPA LC FREE/LAMBDA SER: 1.5 RATIO
DEPRECATED KAPPA LC FREE/LAMBDA SER: 1.5 RATIO
IGA SER QL IEP: 550 MG/DL
IGG SER QL IEP: 619 MG/DL
IGM SER QL IEP: 105 MG/DL
KAPPA LC CSF-MCNC: 1.27 MG/DL
KAPPA LC CSF-MCNC: 1.27 MG/DL
KAPPA LC SERPL-MCNC: 1.91 MG/DL
KAPPA LC SERPL-MCNC: 1.91 MG/DL

## 2021-07-07 ENCOUNTER — APPOINTMENT (OUTPATIENT)
Dept: INTERNAL MEDICINE | Facility: CLINIC | Age: 71
End: 2021-07-07
Payer: MEDICARE

## 2021-07-07 PROCEDURE — 99442: CPT | Mod: 95

## 2021-07-08 DIAGNOSIS — Z01.812 ENCOUNTER FOR PREPROCEDURAL LABORATORY EXAMINATION: ICD-10-CM

## 2021-07-09 ENCOUNTER — LABORATORY RESULT (OUTPATIENT)
Age: 71
End: 2021-07-09

## 2021-07-09 ENCOUNTER — APPOINTMENT (OUTPATIENT)
Dept: INFUSION THERAPY | Facility: HOSPITAL | Age: 71
End: 2021-07-09

## 2021-07-12 LAB
ALBUMIN MFR SERPL ELPH: 58.4 %
ALBUMIN SERPL-MCNC: 3.8 G/DL
ALBUMIN/GLOB SERPL: 1.4 RATIO
ALPHA1 GLOB MFR SERPL ELPH: 4.8 %
ALPHA1 GLOB SERPL ELPH-MCNC: 0.3 G/DL
ALPHA2 GLOB MFR SERPL ELPH: 9.7 %
ALPHA2 GLOB SERPL ELPH-MCNC: 0.6 G/DL
B-GLOBULIN MFR SERPL ELPH: 17.6 %
B-GLOBULIN SERPL ELPH-MCNC: 1.1 G/DL
GAMMA GLOB FLD ELPH-MCNC: 0.6 G/DL
GAMMA GLOB MFR SERPL ELPH: 9.5 %
INTERPRETATION SERPL IEP-IMP: NORMAL
M PROTEIN MFR SERPL ELPH: 6.2 %
M PROTEIN SPEC IFE-MCNC: NORMAL
MONOCLON BAND OBS SERPL: 0.4 G/DL
PROT SERPL-MCNC: 6.5 G/DL
PROT SERPL-MCNC: 6.5 G/DL

## 2021-07-13 ENCOUNTER — RESULT REVIEW (OUTPATIENT)
Age: 71
End: 2021-07-13

## 2021-07-15 ENCOUNTER — RESULT REVIEW (OUTPATIENT)
Age: 71
End: 2021-07-15

## 2021-07-15 ENCOUNTER — LABORATORY RESULT (OUTPATIENT)
Age: 71
End: 2021-07-15

## 2021-07-15 ENCOUNTER — APPOINTMENT (OUTPATIENT)
Dept: INFUSION THERAPY | Facility: HOSPITAL | Age: 71
End: 2021-07-15

## 2021-07-15 LAB
BASOPHILS # BLD AUTO: 0.02 K/UL — SIGNIFICANT CHANGE UP (ref 0–0.2)
BASOPHILS NFR BLD AUTO: 0.2 % — SIGNIFICANT CHANGE UP (ref 0–2)
EOSINOPHIL # BLD AUTO: 0.06 K/UL — SIGNIFICANT CHANGE UP (ref 0–0.5)
EOSINOPHIL NFR BLD AUTO: 0.7 % — SIGNIFICANT CHANGE UP (ref 0–6)
HCT VFR BLD CALC: 39.6 % — SIGNIFICANT CHANGE UP (ref 34.5–45)
HGB BLD-MCNC: 11.8 G/DL — SIGNIFICANT CHANGE UP (ref 11.5–15.5)
IMM GRANULOCYTES NFR BLD AUTO: 0.7 % — SIGNIFICANT CHANGE UP (ref 0–1.5)
LYMPHOCYTES # BLD AUTO: 1.08 K/UL — SIGNIFICANT CHANGE UP (ref 1–3.3)
LYMPHOCYTES # BLD AUTO: 11.9 % — LOW (ref 13–44)
MCHC RBC-ENTMCNC: 23 PG — LOW (ref 27–34)
MCHC RBC-ENTMCNC: 29.8 G/DL — LOW (ref 32–36)
MCV RBC AUTO: 77 FL — LOW (ref 80–100)
MONOCYTES # BLD AUTO: 0.73 K/UL — SIGNIFICANT CHANGE UP (ref 0–0.9)
MONOCYTES NFR BLD AUTO: 8 % — SIGNIFICANT CHANGE UP (ref 2–14)
NEUTROPHILS # BLD AUTO: 7.16 K/UL — SIGNIFICANT CHANGE UP (ref 1.8–7.4)
NEUTROPHILS NFR BLD AUTO: 78.5 % — HIGH (ref 43–77)
NRBC # BLD: 0 /100 WBCS — SIGNIFICANT CHANGE UP (ref 0–0)
PLATELET # BLD AUTO: 229 K/UL — SIGNIFICANT CHANGE UP (ref 150–400)
RBC # BLD: 5.14 M/UL — SIGNIFICANT CHANGE UP (ref 3.8–5.2)
RBC # FLD: 17.7 % — HIGH (ref 10.3–14.5)
WBC # BLD: 9.11 K/UL — SIGNIFICANT CHANGE UP (ref 3.8–10.5)
WBC # FLD AUTO: 9.11 K/UL — SIGNIFICANT CHANGE UP (ref 3.8–10.5)

## 2021-07-16 DIAGNOSIS — C21.0 MALIGNANT NEOPLASM OF ANUS, UNSPECIFIED: ICD-10-CM

## 2021-07-21 ENCOUNTER — APPOINTMENT (OUTPATIENT)
Dept: MRI IMAGING | Facility: CLINIC | Age: 71
End: 2021-07-21
Payer: MEDICARE

## 2021-07-21 ENCOUNTER — APPOINTMENT (OUTPATIENT)
Dept: PULMONOLOGY | Facility: CLINIC | Age: 71
End: 2021-07-21
Payer: MEDICARE

## 2021-07-21 ENCOUNTER — OUTPATIENT (OUTPATIENT)
Dept: OUTPATIENT SERVICES | Facility: HOSPITAL | Age: 71
LOS: 1 days | End: 2021-07-21
Payer: MEDICARE

## 2021-07-21 VITALS
HEIGHT: 67 IN | TEMPERATURE: 95.4 F | RESPIRATION RATE: 16 BRPM | SYSTOLIC BLOOD PRESSURE: 136 MMHG | WEIGHT: 145 LBS | BODY MASS INDEX: 22.76 KG/M2 | DIASTOLIC BLOOD PRESSURE: 64 MMHG | OXYGEN SATURATION: 97 % | HEART RATE: 70 BPM

## 2021-07-21 DIAGNOSIS — Z98.89 OTHER SPECIFIED POSTPROCEDURAL STATES: Chronic | ICD-10-CM

## 2021-07-21 DIAGNOSIS — R42 DIZZINESS AND GIDDINESS: ICD-10-CM

## 2021-07-21 DIAGNOSIS — Z96.653 PRESENCE OF ARTIFICIAL KNEE JOINT, BILATERAL: Chronic | ICD-10-CM

## 2021-07-21 DIAGNOSIS — Z98.890 OTHER SPECIFIED POSTPROCEDURAL STATES: Chronic | ICD-10-CM

## 2021-07-21 DIAGNOSIS — R51 HEADACHE: ICD-10-CM

## 2021-07-21 DIAGNOSIS — H05.352 EXOSTOSIS OF LEFT ORBIT: Chronic | ICD-10-CM

## 2021-07-21 DIAGNOSIS — Z87.09 PERSONAL HISTORY OF OTHER DISEASES OF THE RESPIRATORY SYSTEM: Chronic | ICD-10-CM

## 2021-07-21 DIAGNOSIS — K62.5 HEMORRHAGE OF ANUS AND RECTUM: Chronic | ICD-10-CM

## 2021-07-21 DIAGNOSIS — D47.2 MONOCLONAL GAMMOPATHY: ICD-10-CM

## 2021-07-21 PROCEDURE — ZZZZZ: CPT

## 2021-07-21 PROCEDURE — 70551 MRI BRAIN STEM W/O DYE: CPT

## 2021-07-21 PROCEDURE — 94010 BREATHING CAPACITY TEST: CPT

## 2021-07-21 PROCEDURE — 94727 GAS DIL/WSHOT DETER LNG VOL: CPT

## 2021-07-21 PROCEDURE — 94729 DIFFUSING CAPACITY: CPT

## 2021-07-21 PROCEDURE — 70551 MRI BRAIN STEM W/O DYE: CPT | Mod: 26,MH

## 2021-07-21 PROCEDURE — 95012 NITRIC OXIDE EXP GAS DETER: CPT

## 2021-07-21 PROCEDURE — 99214 OFFICE O/P EST MOD 30 MIN: CPT | Mod: 25

## 2021-07-21 NOTE — ADDENDUM
[FreeTextEntry1] : Documented by Chase Dominguez acting as a scribe for Dr. Eulalio Allison on (07/21/2021).\par \par All medical record entries made by the Scribe were at my, Dr. Eulalio Allison's, direction and personally dictated by me on (07/21/2021). I have reviewed the chart and agree that the record accurately reflects my personal performance of the history, physical exam, assessment and plan. I have also personally directed, reviewed, and agree with the discharge instructions.\par

## 2021-07-21 NOTE — ASSESSMENT
[FreeTextEntry1] : Ms. Bloom is a 70 year old female with a history of mm, rectal CA, AVDz, asthma, allergy, GERD, TBM, s/p multiple pneumonias, who presents From [facility]" with headache. (But "this is not her asthma") c/w arrhythmia - no evidence of asthma/PE,\par \par Her chronic SOB which is multifactorial due to:\par - tracheomalacia (s/p tracheoplasty with residual frequent mucus production, though patient is non-compliant with vest therapy)\par - chronic bronchitis\par - asthma\par - allergic rhinitis\par - GERD\par - poor breathing mechanics \par - CAD/AV disease, arrhythmia, electrolyte issue\par \par \par problem 1a: severe persistent asthma -(steroid dependent) - Stable\par -continue Medrol 4 mg qd\par -continue to use albuterol via the nebulizer QID \par -followed by Mucomyst QiD\par -followed by the acapella device/ chest vest therapy \par -(1st) followed by Perforomist via the nebulizer BID\par -(2nd) followed by Budesonide 0.5% via the nebulizer BID \par -continue to use Breo Ellipta 200 at 1 inhalation QD \par -continue to use Spiriva 1 inhalation QD\par -failed Xolair 225 injection; follow up injections every 2 weeks - Dupixent initiated (12.3.19) - to continue 300 every 2 weeks. \par -continue to use Accolate 20 mg BID\par -Information sheet given about prednisone to the patient to be reviewed, this medication is never to be used without consulting the prescribing physician. Proper dietary restraint is necessary specifically salt containing foods, if any reaction may occur should be reported. \par -Asthma is believed to be caused by inherited (genetic) and environmental factor, but its exact cause is unknown. Asthma may be triggered by allergens, lung infections, or irritants in the air. Asthma triggers are different for each person\par -Inhaler technique reviewed as well as oral hygiene techniques reviewed with patient. Avoidance of cold air, extremes of temperature, rescue inhaler should be used before exercise. Order of medication reviewed with patient. Recommended use of a cool mist humidifier in the bedroom. \par \par problem 1B: Headache? Sinus \par -Complete MRI of the brain \par \par \par problem 2: tracheomalacia, residual bronchomalacia \par -s/p tracheoplasty with Dr. Mt Zapien\par -laser Bronchoscopy pending\par -continue to follow up \par -s/p f/u Dynamic chest CT 10/2019 positive w TBM - repeat \par \par problem 3: chronic bronchitis and mucus clearance\par -continue to use acapella device multiple times daily\par -recommended to use chest vest therapy multiple times daily \par -she is being sent for sputum cultures \par Patient has a chronic cough greater than 6 months, tried and failed manual chest physiotherapy at home, no skilled caregiver available at home to perform manual CPT, tried and failed acapella vibratory physiotherapy, and recommended chest vest therapy \par \par Problem 3A: Multiple infections\par -off Tobramycin BID for 1 month (completed 12/20/19)\par -Complete follow up Sputum culture after completing ABx if needed. \par \par Problem 3B: multi myeloma\par -appointment  on 1/28/2020\par \par problem 4: GERD\par -continue to use Protonix 40 mg before breakfast\par -continue Baclofen 10 mg q-meal\par -Rule of 2s: avoid eating too much, eating too late, eating too spicy, eating two hours before bed\par -Things to avoid including overeating, spicy foods, tight clothing, eating within three hours of bed, this list is not all inclusive. \par -For treatment of reflux, possible options discussed including diet control, H2 blockers, PPIs, as well as coating motility agents discussed as treatment options. Timing of meals and proximity of last meal to sleep were discussed. If symptoms persist, a formal gastrointestinal evaluation is needed. \par \par problem 5: allergic rhinitis \par -continue to use nasal saline\par -continue to use Xyzal 5 mg before bed\par -Environmental measures for allergies were encouraged including mattress and pillow cover, air purifier, and environmental controls. \par \par problem 6: hx of abnormal aortic valve / cardiac health - arrhythmia \par -continue to follow up with Dr. Leahy, AV Disease, AF\par -echocardiogram in June 2018  (Tosin); Kale Tristan for f/u, Ismail\par \par problem 7: colon cancer\par -s/p radiation therapy, surgery \par -continue to use chemotherapy follow up with Dr. King or Dr. Mario\par \par problem 8: poor breathing mechanics\par -Proper breathing techniques were reviewed with an emphasis of exhalation. Patient instructed to breath in for 1 second and out for four seconds. Patient was encouraged to not talk while walking.\par \par problem 9: immunodeficiency\par - Due to the fact that this pt has had more infections than would be expected and immunological blood work is indicated this would include: IgG subclasses, quantitative immunoglobulins, Strep pneumoniae titers as well as Vitamin D levels. Based on this blood work we will be able to decide where the pt needs additional pneumococcal vaccine either Prevnar 13 or pneumovax. Immunology evaluation will also be potentially indicated.\par \par problem 10: r/o immunodeficiency (on Medrol)\par -Due to the fact that this pt has had more infections than would be expected and immunological blood work is indicated this would include: IgG subclasses, quantitative immunoglobulins, Strep pneumoniae titers as well as Vitamin D levels.\par -Based on this blood work we will be able to decide where the pt needs additional pneumococcal vaccine either Prevnar 13 or pneumovax. Immunology evaluation will also be potentially indicated. \par \par problem 11: abnormal chest CT- ? new nodule- likely inflammation \par - F/u PET/CT 4/2019- if changed Bx (Rupali); follow up and re-evaluate as per Rupali\par -questionable DIEUDONNE or HERMELINDO, all sputum is negative \par -CAT scans are the only radiological modality to identify abnormalities w/in the lungs with regards to nodules/masses/lymph nodes. Risks, benefits were reviewed in detail. The guidelines for abnormalities include follow up CT scans at various intervals which could range from 6 weeks to 1 year intervals. If there is a change for the worse then consideration for a biopsy will be considered if you are a candidate. Second opinion evaluation with a thoracic surgeon or an interventional radiologist could be offered. 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 It sometimes develops following a URI, herpes zoster outbreak in pharynx or thyroid or cervical spine injury. However, many patients have no identifiable antecedent explanation. \par \par Problem 14: Health Maintenance/COVID19 Precautions \par -s/p  Moderna COVID 19 vaccine x 2\par - Clean your hands often. Wash your hands often with soap and water for at least 20 seconds, especially after blowing your nose, coughing, or sneezing, or having been in a public place.\par - If soap and water are not available, use a hand  that contains at least 60% alcohol.\par - To the extent possible, avoid touching high-touch surfaces in public places - elevator buttons, door handles, handrails, handshaking with people, etc. Use a tissue or your sleeve to cover your hand or finger if you must touch something.\par - Wash your hands after touching surfaces in public places.\par - Avoid touching your face, nose, eyes, etc.\par - Clean and disinfect your home to remove germs: practice routine cleaning of frequently touched surfaces (for example: tables, doorknobs, light switches, handles, desks, toilets, faucets, sinks & cell phones)\par - Avoid crowds, especially in poorly ventilated spaces. Your risk of exposure to respiratory viruses like COVID-19 may increase in crowded, closed-in settings with little air circulation if there are people in the crowd who are sick. All patients are recommended to practice social distancing and stay at least 6 feet away from others. \par - Avoid all non-essential travel including plane trips, and especially avoid embarking on cruise ships.\par -If COVID-19 is spreading in your community, take extra measures to put distance between yourself and other people to further reduce your risk of being exposed to this new virus.\par -Stay home as much as possible.\par - Consider ways of getting food brought to your house through family, social, or commercial networks\par -Be aware that the virus has been known to live in the air up to 3 hours post exposure, cardboard up to 24 hours post exposure, copper up to 4 hours post exposure, steel and plastic up to 2-3 days post exposure. Risk of transmission from these surfaces are affected by many variables.\par COVID-19 precautionary Immune Support Recommendations:\par -OTC Vitamin C 500mg BID \par -OTC Quercetin 250-500mg BID \par -OTC Zinc 75-100mg per day \par -OTC Melatonin 1 or 2mg a night \par -OTC Vitamin D 1-4000mg per day \par -OTC Tonic Water 8oz per day\par -Water 0.5-1 gallon per day\par Asthma and COVID19:\par You need to make sure your asthma is under control. This often requires the use of inhaled corticosteroids (and sometimes oral corticosteroids). Inhaled corticosteroids do not likely reduce your immune system’s ability to fight infections, but oral corticosteroids may. It is important to use the steps above to protect yourself to limit your exposure to any respiratory virus. \par \par problem 15:  health maintenance \par -s/p flu shot 10/30/2020\par -6/2021 Shingeles: Valacyclovir in progress \par -recommended strep pneumonia vaccines: Prevnar-13 vaccine, followed by Pneumo vaccine 23 one year following\par -recommended early intervention for URIs\par -recommended regular osteoporosis evaluations\par -recommended early dermatological evaluations\par -recommended after the age of 50 to the age of 70, colonoscopy every 5 years \par \par F/U in 6 weeks - SPI / NIOX\par She is encouraged to call with any changes, concerns, or questions.

## 2021-07-21 NOTE — HISTORY OF PRESENT ILLNESS
[FreeTextEntry1] : Ms. Bloom is a 70 year old female with a history of abnormal CXR/chest CT, allergic rhinitis, severe persistent asthma, bronchiectasis, GERD, COPD, recurrent PNA, PND, s/p tracheoplasty, TBM, and SOB presenting to the office today for a follow up visit. Her chief complaint is\par \par -major complaint is a headache that’s been there for 3/4 days \par -She notes constant headache that engulfs her entire head\par -She denies wheezing \par -She notes a funny feeling her chest \par -She denies dysphagia \par -no new medications, vitamins, or supplements \par -She notes her stomach has been queazy \par -She notes left foot feels swollen \par -She notes fatigue \par -She notes using a walker because of not being sure of if she'll fall or not \par -She notes her hand sometimes jerks to the side \par -She notes waking up in the morning with a cough that sounds very deep but doesn't think its her lungs\par - She notes taking Dupixent \par -She stopped taking Daliresp  \par -She denies hearing issues \par \par patient denies any headaches, nausea, vomiting, fever, chills, sweats, chest pain, chest pressure, palpitations, coughing, wheezing, fatigue, diarrhea, constipation, dysphagia, myalgias, dizziness, leg swelling, leg pain, itchy eyes, itchy ears, heartburn, reflux or sour taste in the mouth

## 2021-07-21 NOTE — PROCEDURE
[FreeTextEntry1] : Feno was 16; a normal value being less than 25. Fractional exhaled nitric oxide (FENO) is regarded as a simple, noninvasive method for assessing eosinophilic airway inflammation. Produced by a variety of cells within the lung, nitric oxide (NO) concentrations are generally low in healthy individuals. However, high concentrations of NO appear to be involved in nonspecific host defense mechanisms and chronic inflammatory  diseases such as asthma. The American Thoracic Society (ATS) therefore recommended using FENO to aid in the diagnosis and monitoring of eosinophilic airway inflammation and asthma, and for identifying steroid responsive individuals whose chronic respiratory symptoms may be caused by airway inflammation \par \par Full PFT revealed mild restrictive and moderate obstructive flows, with a FEV1 of 1.18L, which is 52% of predicted,low  normal lung volumes, and a diffusion of 19.9, which is 102% of predicted, with a normal flow volume loop \par

## 2021-07-22 ENCOUNTER — APPOINTMENT (OUTPATIENT)
Dept: CARDIOLOGY | Facility: CLINIC | Age: 71
End: 2021-07-22
Payer: MEDICARE

## 2021-07-22 ENCOUNTER — NON-APPOINTMENT (OUTPATIENT)
Age: 71
End: 2021-07-22

## 2021-07-22 ENCOUNTER — APPOINTMENT (OUTPATIENT)
Dept: ELECTROPHYSIOLOGY | Facility: CLINIC | Age: 71
End: 2021-07-22
Payer: MEDICARE

## 2021-07-22 VITALS
HEIGHT: 67 IN | SYSTOLIC BLOOD PRESSURE: 157 MMHG | DIASTOLIC BLOOD PRESSURE: 71 MMHG | OXYGEN SATURATION: 99 % | BODY MASS INDEX: 22.71 KG/M2 | HEART RATE: 62 BPM

## 2021-07-22 VITALS — WEIGHT: 145 LBS | BODY MASS INDEX: 22.71 KG/M2

## 2021-07-22 PROCEDURE — 93000 ELECTROCARDIOGRAM COMPLETE: CPT

## 2021-07-22 PROCEDURE — 99215 OFFICE O/P EST HI 40 MIN: CPT

## 2021-07-22 PROCEDURE — 99214 OFFICE O/P EST MOD 30 MIN: CPT

## 2021-07-25 NOTE — CARDIOLOGY SUMMARY
[de-identified] : TTE Echo Complete w/o Contrast w/ Doppler (01.04.21 @ 15:47) >\par \par --------------------------------------------------------------------------------\par CONCLUSIONS:\par \par 1. The aortic valve is tricuspid and mildly thickned. No hemodynamically significant aortic stenosis. There is moderate-to-severe aortic regurgitation.\par 2. No other significant valvular disease.\par 3. The right ventricle is normal in size. Right ventricular systolic function is normal.\par 4. There is mild concentric left ventricular hypertrophy. There are no regional wall motion abnormalities seen. Left ventricular systolic function is hyperdynamic with a calculated ejection fraction of >75%.\par 5. The aortic root is dilated measuring 4.00 cm.\par 6. No pericardial effusion.\par \par \par < from: Transthoracic Echocardiogram (04.23.21 @ 09:26) >\par DIMENSIONS:\par Dimensions:     Normal Values:\par LA:     4.1 cm    2.0 - 4.0 cm\par Ao:     3.3 cm    2.0 - 3.8 cm\par SEPTUM: 0.7 cm    0.6 - 1.2 cm\par PWT:    0.7 cm0.6 - 1.1 cm\par LVIDd:  4.0 cm    3.0 - 5.6 cm\par LVIDs:  2.7 cm    1.8 - 4.0 cm\par Derived Variables:\par LVMI: 45 g/m2\par RWT: 0.35\par Fractional short: 33 %\par Ejection Fraction (Teicholtz): 61 %\par ------------------------------------------------------------------------\par OBSERVATIONS:\par Mitral Valve: Mitral annular calcification, otherwise\par normal mitral valve. Minimal mitral regurgitation.\par Aortic Root: Normal aortic root.\par Aortic Valve: Calcified trileaflet aortic valve with normal\par opening. Moderate aortic regurgitation.  Vena contracta\par width about  0.4 cm.\par Left Atrium: Mildly dilated left atrium.  LA volume index =\par 35 cc/m2.\par Left Ventricle: Normal left ventricular systolic function.\par No segmental wall motion abnormalities. Normal left\par ventricular internal dimensions and wall thicknesses. Mild\par diastolic dysfunction (Stage I).\par Right Heart: Normal right atrium. Normal right ventricular\par size and function. Normal tricuspid valve. Minimal\par tricuspid regurgitation. Normal pulmonic valve. Minimal\par pulmonic regurgitation.\par Pericardium/PleuraNormal pericardium with no pericardial\par effusion.\par ------------------------------------------------------------------------\par CONCLUSIONS:\par 1. Mitral annular calcification, otherwise normal mitral\par valve. Minimal mitral regurgitation.\par 2. Calcified trileaflet aortic valve with normal opening.\par Moderate aortic regurgitation.  Vena contracta width about\par 0.4 cm.\par 3. Mildly dilated left atrium.  LA volume index = 35 cc/m2.\par 4. Normal left ventricular internaldimensions and wall\par thicknesses.\par 5. Normal left ventricular systolic function. No segmental\par wall motion abnormalities.\par 6. Mild diastolic dysfunction (Stage I).\par 7. Normal right ventricular size and function.\par ------------------------------------------------------------------------\par Confirmed on  4/23/2021 - 10:42:27 by Sergio Arizmendi M.D.,\par [facility]\par ----------------------------------------------------------\par

## 2021-07-25 NOTE — DISCUSSION/SUMMARY
[FreeTextEntry1] : This is 70year old woman with Hx of tracheobronchomalacia s/p tracheobronchoplasty, lung nodules, severe allergic asthma, adrenal insufficiency, bronchiectasis, DM II, HTN, CRC s/p colostomy, mod-severe AI, paroxysmal afib, on Eliquis, and recent hospitalization for palpitations with noted frequent PVCs, now on mexiletine, who presents today for routine f/u. \par \par Impression:\par 1. PVCs: EKG performed today to assess for presence of recurrent PVCs and reveals sinus rhythm, no evidence of ventricular ectopy. Marked improvement with mexiletine use. Will resume mexiletine 200mg TID as prescribed. \par \par 2. Paroxysmal afib: Remains on diltiazem and Eliquis as prescribed. \par \par 3. HTN: BP elevated in office, not on antihypertensives. Last BP with Pulm better controlled. Has a visiting nurse who is monitoring her BP at home. Will f/u with PCP. Encouraged heart healthy diet, sodium restriction, and weight loss. Continue regular f/u with Cardiologist for further HTN management.\par \par Will continue f/u with Cardiologist and may RTO as needed or if any new or worsening

## 2021-07-25 NOTE — HISTORY OF PRESENT ILLNESS
[FreeTextEntry1] : This is 70year old woman with Hx of tracheobronchomalacia s/p tracheobronchoplasty, lung nodules, severe allergic asthma, adrenal insufficiency, bronchiectasis, DM II, HTN, CRC s/p colostomy, mod-severe AI, paroxysmal afib, on Eliquis, and recent hospitalization for palpitations with noted frequent PVCs, now on mexiletine, who presents today for routine f/u. On today's visit, Pt denies palpitation since Mexiletine on.

## 2021-07-26 ENCOUNTER — OUTPATIENT (OUTPATIENT)
Dept: OUTPATIENT SERVICES | Facility: HOSPITAL | Age: 71
LOS: 1 days | End: 2021-07-26
Payer: MEDICARE

## 2021-07-26 ENCOUNTER — APPOINTMENT (OUTPATIENT)
Dept: CT IMAGING | Facility: IMAGING CENTER | Age: 71
End: 2021-07-26
Payer: MEDICARE

## 2021-07-26 DIAGNOSIS — Z96.653 PRESENCE OF ARTIFICIAL KNEE JOINT, BILATERAL: Chronic | ICD-10-CM

## 2021-07-26 DIAGNOSIS — H05.352 EXOSTOSIS OF LEFT ORBIT: Chronic | ICD-10-CM

## 2021-07-26 DIAGNOSIS — Z98.89 OTHER SPECIFIED POSTPROCEDURAL STATES: Chronic | ICD-10-CM

## 2021-07-26 DIAGNOSIS — C21.1 MALIGNANT NEOPLASM OF ANAL CANAL: ICD-10-CM

## 2021-07-26 DIAGNOSIS — K62.5 HEMORRHAGE OF ANUS AND RECTUM: Chronic | ICD-10-CM

## 2021-07-26 DIAGNOSIS — Z87.09 PERSONAL HISTORY OF OTHER DISEASES OF THE RESPIRATORY SYSTEM: Chronic | ICD-10-CM

## 2021-07-26 DIAGNOSIS — Z98.890 OTHER SPECIFIED POSTPROCEDURAL STATES: Chronic | ICD-10-CM

## 2021-07-26 PROCEDURE — 71260 CT THORAX DX C+: CPT

## 2021-07-26 PROCEDURE — 71260 CT THORAX DX C+: CPT | Mod: 26,MG

## 2021-07-26 PROCEDURE — G1004: CPT

## 2021-07-26 PROCEDURE — 74177 CT ABD & PELVIS W/CONTRAST: CPT | Mod: 26,MG

## 2021-07-26 PROCEDURE — 82565 ASSAY OF CREATININE: CPT

## 2021-07-26 PROCEDURE — 74177 CT ABD & PELVIS W/CONTRAST: CPT

## 2021-08-05 ENCOUNTER — NON-APPOINTMENT (OUTPATIENT)
Age: 71
End: 2021-08-05

## 2021-08-07 NOTE — DIETITIAN INITIAL EVALUATION ADULT. - PROBLEM SELECTOR PLAN 2
Statement Selected
- patient states chronically she has SOB at rest and attributes it to her long history of asthma, states she feels SOB has worsened over the past week but not to point she requiring supplemental oxygen. Physical exam shows diffuse expiratory wheezing and rhonchi  - continue to monitor respiratory symptoms, currently sat 94 on RA  - IS/acapella  - c/w medrol 4 mg QD

## 2021-08-10 ENCOUNTER — NON-APPOINTMENT (OUTPATIENT)
Age: 71
End: 2021-08-10

## 2021-08-25 ENCOUNTER — OUTPATIENT (OUTPATIENT)
Dept: OUTPATIENT SERVICES | Facility: HOSPITAL | Age: 71
LOS: 1 days | Discharge: HOME | End: 2021-08-25

## 2021-08-25 DIAGNOSIS — Z98.89 OTHER SPECIFIED POSTPROCEDURAL STATES: Chronic | ICD-10-CM

## 2021-08-25 DIAGNOSIS — C18.9 MALIGNANT NEOPLASM OF COLON, UNSPECIFIED: ICD-10-CM

## 2021-08-25 DIAGNOSIS — H05.352 EXOSTOSIS OF LEFT ORBIT: Chronic | ICD-10-CM

## 2021-08-25 DIAGNOSIS — Z87.09 PERSONAL HISTORY OF OTHER DISEASES OF THE RESPIRATORY SYSTEM: Chronic | ICD-10-CM

## 2021-08-25 DIAGNOSIS — Z98.890 OTHER SPECIFIED POSTPROCEDURAL STATES: Chronic | ICD-10-CM

## 2021-08-25 DIAGNOSIS — K62.5 HEMORRHAGE OF ANUS AND RECTUM: Chronic | ICD-10-CM

## 2021-08-25 DIAGNOSIS — Z96.653 PRESENCE OF ARTIFICIAL KNEE JOINT, BILATERAL: Chronic | ICD-10-CM

## 2021-08-26 ENCOUNTER — RESULT REVIEW (OUTPATIENT)
Age: 71
End: 2021-08-26

## 2021-08-26 ENCOUNTER — APPOINTMENT (OUTPATIENT)
Dept: INFUSION THERAPY | Facility: HOSPITAL | Age: 71
End: 2021-08-26

## 2021-08-26 ENCOUNTER — LABORATORY RESULT (OUTPATIENT)
Age: 71
End: 2021-08-26

## 2021-08-26 LAB
BASOPHILS # BLD AUTO: 0.03 K/UL — SIGNIFICANT CHANGE UP (ref 0–0.2)
BASOPHILS NFR BLD AUTO: 0.3 % — SIGNIFICANT CHANGE UP (ref 0–2)
EOSINOPHIL # BLD AUTO: 0.05 K/UL — SIGNIFICANT CHANGE UP (ref 0–0.5)
EOSINOPHIL NFR BLD AUTO: 0.5 % — SIGNIFICANT CHANGE UP (ref 0–6)
HCT VFR BLD CALC: 41.5 % — SIGNIFICANT CHANGE UP (ref 34.5–45)
HGB BLD-MCNC: 12.6 G/DL — SIGNIFICANT CHANGE UP (ref 11.5–15.5)
IMM GRANULOCYTES NFR BLD AUTO: 0.7 % — SIGNIFICANT CHANGE UP (ref 0–1.5)
LYMPHOCYTES # BLD AUTO: 1.19 K/UL — SIGNIFICANT CHANGE UP (ref 1–3.3)
LYMPHOCYTES # BLD AUTO: 13 % — SIGNIFICANT CHANGE UP (ref 13–44)
MCHC RBC-ENTMCNC: 23.7 PG — LOW (ref 27–34)
MCHC RBC-ENTMCNC: 30.4 G/DL — LOW (ref 32–36)
MCV RBC AUTO: 78.2 FL — LOW (ref 80–100)
MONOCYTES # BLD AUTO: 0.77 K/UL — SIGNIFICANT CHANGE UP (ref 0–0.9)
MONOCYTES NFR BLD AUTO: 8.4 % — SIGNIFICANT CHANGE UP (ref 2–14)
NEUTROPHILS # BLD AUTO: 7.05 K/UL — SIGNIFICANT CHANGE UP (ref 1.8–7.4)
NEUTROPHILS NFR BLD AUTO: 77.1 % — HIGH (ref 43–77)
NRBC # BLD: 0 /100 WBCS — SIGNIFICANT CHANGE UP (ref 0–0)
PLATELET # BLD AUTO: 258 K/UL — SIGNIFICANT CHANGE UP (ref 150–400)
RBC # BLD: 5.31 M/UL — HIGH (ref 3.8–5.2)
RBC # FLD: 19.6 % — HIGH (ref 10.3–14.5)
WBC # BLD: 9.15 K/UL — SIGNIFICANT CHANGE UP (ref 3.8–10.5)
WBC # FLD AUTO: 9.15 K/UL — SIGNIFICANT CHANGE UP (ref 3.8–10.5)

## 2021-08-30 ENCOUNTER — APPOINTMENT (OUTPATIENT)
Dept: PULMONOLOGY | Facility: CLINIC | Age: 71
End: 2021-08-30
Payer: MEDICARE

## 2021-08-30 VITALS — WEIGHT: 137 LBS | BODY MASS INDEX: 21.5 KG/M2 | HEIGHT: 67 IN

## 2021-08-30 PROCEDURE — 99441: CPT | Mod: 95

## 2021-08-30 NOTE — REASON FOR VISIT
[Acute] : an acute visit [Home] : at home, [unfilled] , at the time of the visit. [Medical Office: ([facility])___] : at the medical office located in  [Verbal consent obtained from patient] : the patient, [unfilled] [Asthma] : asthma

## 2021-08-30 NOTE — ASSESSMENT
[FreeTextEntry1] : Ms. Bloom is a 70 year old female with a history of abnormal CXR/chest CT, allergic rhinitis, severe persistent asthma, bronchiectasis, GERD, COPD, recurrent PNA, PND, s/p tracheoplasty, TBM, and SOB presenting to the office today, via phone, for an acute care visit. \par \par 1. Severe persistent asthma -(steroid dependent):\par - continue Medrol 4 mg qd\par - continue to use albuterol via the nebulizer QID \par -continue to use Breo Ellipta 200 at 1 inhalation QD \par -continue to use Spiriva 1 inhalation QD\par - s/p Xolair and Dupixent; has since discontinued.\par \par 2. Chronic Sinus concerns:\par - S/P Evaluation with Dr. VANCE Espino - RX'ed Allegra, no improvement. \par - Add Fluticasone Nasal Spray. 1 squirt each nostril BID\par - Add Azelastine Nasal Spray. 1 squirt each nostril BID\par \par \par Patient to follow up with Dr. Allison as scheduled.\par Patient to call with further questions and concerns.\par Patient verbalizes understanding of care plan and is agreeable.\par \par * I HAVE SPENT 9 MINS ON THE PHONE WITH THIS PATIENT *

## 2021-08-30 NOTE — HISTORY OF PRESENT ILLNESS
[TextBox_4] : Ms. Bloom is a 70 year old female with a history of abnormal CXR/chest CT, allergic rhinitis, severe persistent asthma, bronchiectasis, GERD, COPD, recurrent PNA, PND, s/p tracheoplasty, TBM, and SOB presenting to the office today, via phone, for an acute care visit. \par \par Her chief concern is persistent cough that is productive. \par \par She notes wheezing coming from throat, not from her chest.  She states it was the same way when she was evaluated by Dr. Allison in person on 7/21/21 visit.  She was evaluated by ENT, Dr. Espino, on 8/17/21.  She notes she had scope and was told she has chronic sinusitis. She was RX'ed Allegra, no improvement. She notes SOB which she feels is r/t to her heart, not her lungs. She notes her sputum color is her baseline range of yellow to clear. \par \par She notes she is currently on Medrol 4 mg, Singulair 10 mg, Spiriva 1 puff, Breo 1 inhale, Albuterol PRN (has not needed) Protonix 40 mg, Eliquis 5 mg BID, Crestor 5 mg, Novolog, Lantus, Victoza, Diltiazem 30 BID, Amezelatine 300 BID. She discontinued Dailresp and Dupixent. \par \par She denies wheezing.

## 2021-08-30 NOTE — REVIEW OF SYSTEMS
[Sinus Problems] : sinus problems [Cough] : cough [Sputum] : sputum [SOB on Exertion] : sob on exertion [Negative] : Endocrine [Chest Tightness] : no chest tightness [Wheezing] : no wheezing

## 2021-09-05 NOTE — H&P ADULT - PROBLEM/PLAN-1
Nutrition Care Plan    Nutrition Diagnosis:   Inadequate intake related to CVA as evidenced by current NPO status.    Intervention:  Modified diet:  NPO    -- Will await recommendations from speech therapy, if patient cannot safely obtain PO intakes consider nutrition support (consult if needed)     Monitoring and Evaluation:  Total energy intake:  Goal for patient to establish nutrition source in the next 3-5 days        DISPLAY PLAN FREE TEXT

## 2021-09-14 ENCOUNTER — NON-APPOINTMENT (OUTPATIENT)
Age: 71
End: 2021-09-14

## 2021-09-20 ENCOUNTER — NON-APPOINTMENT (OUTPATIENT)
Age: 71
End: 2021-09-20

## 2021-09-24 LAB — BACTERIA SPT CF RESP CULT: ABNORMAL

## 2021-09-28 ENCOUNTER — APPOINTMENT (OUTPATIENT)
Dept: CARDIOLOGY | Facility: CLINIC | Age: 71
End: 2021-09-28
Payer: MEDICARE

## 2021-09-28 DIAGNOSIS — Z01.810 ENCOUNTER FOR PREPROCEDURAL CARDIOVASCULAR EXAMINATION: ICD-10-CM

## 2021-09-28 PROCEDURE — 99214 OFFICE O/P EST MOD 30 MIN: CPT | Mod: 95

## 2021-09-29 ENCOUNTER — OUTPATIENT (OUTPATIENT)
Dept: OUTPATIENT SERVICES | Facility: HOSPITAL | Age: 71
LOS: 1 days | End: 2021-09-29
Payer: MEDICARE

## 2021-09-29 ENCOUNTER — RESULT REVIEW (OUTPATIENT)
Age: 71
End: 2021-09-29

## 2021-09-29 ENCOUNTER — APPOINTMENT (OUTPATIENT)
Dept: MAMMOGRAPHY | Facility: IMAGING CENTER | Age: 71
End: 2021-09-29
Payer: MEDICARE

## 2021-09-29 ENCOUNTER — APPOINTMENT (OUTPATIENT)
Dept: ULTRASOUND IMAGING | Facility: IMAGING CENTER | Age: 71
End: 2021-09-29
Payer: MEDICARE

## 2021-09-29 DIAGNOSIS — K62.5 HEMORRHAGE OF ANUS AND RECTUM: Chronic | ICD-10-CM

## 2021-09-29 DIAGNOSIS — H05.352 EXOSTOSIS OF LEFT ORBIT: Chronic | ICD-10-CM

## 2021-09-29 DIAGNOSIS — Z98.89 OTHER SPECIFIED POSTPROCEDURAL STATES: Chronic | ICD-10-CM

## 2021-09-29 DIAGNOSIS — Z98.890 OTHER SPECIFIED POSTPROCEDURAL STATES: Chronic | ICD-10-CM

## 2021-09-29 DIAGNOSIS — Z87.09 PERSONAL HISTORY OF OTHER DISEASES OF THE RESPIRATORY SYSTEM: Chronic | ICD-10-CM

## 2021-09-29 DIAGNOSIS — Z96.653 PRESENCE OF ARTIFICIAL KNEE JOINT, BILATERAL: Chronic | ICD-10-CM

## 2021-09-29 DIAGNOSIS — C21.1 MALIGNANT NEOPLASM OF ANAL CANAL: ICD-10-CM

## 2021-09-29 PROCEDURE — 76641 ULTRASOUND BREAST COMPLETE: CPT | Mod: 26,50

## 2021-09-29 PROCEDURE — 77067 SCR MAMMO BI INCL CAD: CPT

## 2021-09-29 PROCEDURE — 77067 SCR MAMMO BI INCL CAD: CPT | Mod: 26

## 2021-09-29 PROCEDURE — 77063 BREAST TOMOSYNTHESIS BI: CPT

## 2021-09-29 PROCEDURE — 76641 ULTRASOUND BREAST COMPLETE: CPT

## 2021-09-29 PROCEDURE — 77063 BREAST TOMOSYNTHESIS BI: CPT | Mod: 26

## 2021-10-01 ENCOUNTER — OUTPATIENT (OUTPATIENT)
Dept: OUTPATIENT SERVICES | Facility: HOSPITAL | Age: 71
LOS: 1 days | Discharge: HOME | End: 2021-10-01

## 2021-10-01 DIAGNOSIS — K62.5 HEMORRHAGE OF ANUS AND RECTUM: Chronic | ICD-10-CM

## 2021-10-01 DIAGNOSIS — Z98.89 OTHER SPECIFIED POSTPROCEDURAL STATES: Chronic | ICD-10-CM

## 2021-10-01 DIAGNOSIS — Z98.890 OTHER SPECIFIED POSTPROCEDURAL STATES: Chronic | ICD-10-CM

## 2021-10-01 DIAGNOSIS — Z96.653 PRESENCE OF ARTIFICIAL KNEE JOINT, BILATERAL: Chronic | ICD-10-CM

## 2021-10-01 DIAGNOSIS — C18.9 MALIGNANT NEOPLASM OF COLON, UNSPECIFIED: ICD-10-CM

## 2021-10-01 DIAGNOSIS — Z87.09 PERSONAL HISTORY OF OTHER DISEASES OF THE RESPIRATORY SYSTEM: Chronic | ICD-10-CM

## 2021-10-01 DIAGNOSIS — H05.352 EXOSTOSIS OF LEFT ORBIT: Chronic | ICD-10-CM

## 2021-10-04 ENCOUNTER — RESULT REVIEW (OUTPATIENT)
Age: 71
End: 2021-10-04

## 2021-10-04 ENCOUNTER — APPOINTMENT (OUTPATIENT)
Dept: INFUSION THERAPY | Facility: HOSPITAL | Age: 71
End: 2021-10-04

## 2021-10-04 ENCOUNTER — LABORATORY RESULT (OUTPATIENT)
Age: 71
End: 2021-10-04

## 2021-10-04 ENCOUNTER — APPOINTMENT (OUTPATIENT)
Dept: INFECTIOUS DISEASE | Facility: CLINIC | Age: 71
End: 2021-10-04
Payer: MEDICARE

## 2021-10-04 VITALS
BODY MASS INDEX: 21.5 KG/M2 | DIASTOLIC BLOOD PRESSURE: 77 MMHG | HEART RATE: 65 BPM | WEIGHT: 137 LBS | TEMPERATURE: 98.6 F | HEIGHT: 67 IN | SYSTOLIC BLOOD PRESSURE: 160 MMHG | OXYGEN SATURATION: 98 %

## 2021-10-04 DIAGNOSIS — Z23 ENCOUNTER FOR IMMUNIZATION: ICD-10-CM

## 2021-10-04 LAB
BASOPHILS # BLD AUTO: 0.02 K/UL — SIGNIFICANT CHANGE UP (ref 0–0.2)
BASOPHILS NFR BLD AUTO: 0.3 % — SIGNIFICANT CHANGE UP (ref 0–2)
EOSINOPHIL # BLD AUTO: 0.09 K/UL — SIGNIFICANT CHANGE UP (ref 0–0.5)
EOSINOPHIL NFR BLD AUTO: 1.3 % — SIGNIFICANT CHANGE UP (ref 0–6)
HCT VFR BLD CALC: 40.6 % — SIGNIFICANT CHANGE UP (ref 34.5–45)
HGB BLD-MCNC: 12.2 G/DL — SIGNIFICANT CHANGE UP (ref 11.5–15.5)
IMM GRANULOCYTES NFR BLD AUTO: 0.6 % — SIGNIFICANT CHANGE UP (ref 0–1.5)
LYMPHOCYTES # BLD AUTO: 1.55 K/UL — SIGNIFICANT CHANGE UP (ref 1–3.3)
LYMPHOCYTES # BLD AUTO: 22.3 % — SIGNIFICANT CHANGE UP (ref 13–44)
MCHC RBC-ENTMCNC: 23.8 PG — LOW (ref 27–34)
MCHC RBC-ENTMCNC: 30 G/DL — LOW (ref 32–36)
MCV RBC AUTO: 79.1 FL — LOW (ref 80–100)
MONOCYTES # BLD AUTO: 0.64 K/UL — SIGNIFICANT CHANGE UP (ref 0–0.9)
MONOCYTES NFR BLD AUTO: 9.2 % — SIGNIFICANT CHANGE UP (ref 2–14)
NEUTROPHILS # BLD AUTO: 4.61 K/UL — SIGNIFICANT CHANGE UP (ref 1.8–7.4)
NEUTROPHILS NFR BLD AUTO: 66.3 % — SIGNIFICANT CHANGE UP (ref 43–77)
NRBC # BLD: 0 /100 WBCS — SIGNIFICANT CHANGE UP (ref 0–0)
PLATELET # BLD AUTO: 235 K/UL — SIGNIFICANT CHANGE UP (ref 150–400)
RBC # BLD: 5.13 M/UL — SIGNIFICANT CHANGE UP (ref 3.8–5.2)
RBC # FLD: 17.1 % — HIGH (ref 10.3–14.5)
WBC # BLD: 6.95 K/UL — SIGNIFICANT CHANGE UP (ref 3.8–10.5)
WBC # FLD AUTO: 6.95 K/UL — SIGNIFICANT CHANGE UP (ref 3.8–10.5)

## 2021-10-04 PROCEDURE — 99214 OFFICE O/P EST MOD 30 MIN: CPT | Mod: 25

## 2021-10-04 PROCEDURE — G0008: CPT

## 2021-10-04 PROCEDURE — 90662 IIV NO PRSV INCREASED AG IM: CPT

## 2021-10-04 NOTE — CONSULT LETTER
[Dear  ___] : Dear  [unfilled], [Consult Letter:] : I had the pleasure of evaluating your patient, [unfilled]. [Consult Closing:] : Thank you very much for allowing me to participate in the care of this patient.  If you have any questions, please do not hesitate to contact me. [Sincerely,] : Sincerely, [FreeTextEntry2] : Dr. Eulalio Allison [FreeTextEntry3] : \par Afshan Stout MD\par  of Medicine\par Division of Infectious Diseases\par [facility]\par [address] [place] [address] DrShreya\[place] [address]\par Tel: [phone]\par Fax: [phone]

## 2021-10-04 NOTE — ASSESSMENT
[FreeTextEntry1] : This is a 70 yo F with extensive medical history notable for abnormal CT chest with nodules, persistent severe asthma steroid dependent, tracheobronchomalacia s/p tracheoplasty, AFIB on Eliquis,  who presented for recurrent respiratory infections and new MRSA boil in 1/2020   Boil has since resolved.\par \par Pt now returns b/c her  cough worsened with more mucus.  Sputum culture positive for polymicrobial organisms. Treated with levaquin but no major improvement.  She has not had fevers.  Hesitant to prescribe more antibiotic at this time. Her cough can linger b/c of her chronic conditions.  Would await thoracic surgery input.  \par \par Agree w/ w/up for hematuria.  F/up urine  culture.  \par \par  Will need to be cautious with antibiotic choice given her allergy history. PCN allergy - shortness of breath. \par Unsure is she tolerates cephalosporins.  \par \par She should continue with mucus clearing devices. \par \par She can return as needed and will call her to discuss results.\par High dose flu vaccine today.\par

## 2021-10-04 NOTE — HISTORY OF PRESENT ILLNESS
[FreeTextEntry1] : 72 yo F presents today for abnormal sputum cultures.\par Not seen since 3/2021.\par Noted that she developed a cough. Sputum was collected and she grew Klebsiella and acinetobacter and chryseobacter.  She was treated with 1 week of levaquin.  Still has cough. \par \par Also noted hematuria.  Gave UA and urine culture today at oncology office. Denies dysuria or other symptoms of UTI.  Has been present for about 1 month.  May need to see Urology. \par \par Previous cultures had repeated pseudomonas.  \par Found to have pseudomonal infection on bronchoscopy 2/14/2020. Treated  IV aztreonam  for pseudomonas via port.  \par \par Also used to have chronic wheezing and shortness of breath.  Found to have PVCs and being treated for this. Wheezing has stopped. \par \par \par \par PMH:\par Asthma (severe persistent, steroid dependent)\par Tracheobronchomalacia s/p tracheobronchoplasty 2018\par Adrenal insufficiency - on steroids >50 years  Takes Medrol 12 mg am and 12  mg pm. \par Diabetes\par Afib\par Aortic insufficiency\par Colorectoal Cancer 2017 s/p resection and chemo/xrt with diverting ostomy\par Pinched nerve in neck\par  MRSA boil on Right lower abdomen. 12/2019\par PVC\par \par She was referred to immunology b/c of recurrent infections.  T cell count is low.  Mumps protection weaned.  Found to have MGUS, IgA monoclonal gammopathy.   \par \par \par Pt also reports that she has been seen at [facility] >30 years ago .  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 \par \par Did take tobramycin for about one month for h/o pseudomonas.\par \par Using acapella\par Has vest but she's not using it. Is heavy and feels it doesn't help her. \par \par To see Dr. Mcclellan next week for TBM f/up. \par \par 7/2021 CT chest:\par Secretions w/in right and left mainstem bronchi.  CLusters of tiny nodules in the distal right middle lobe and right lower lobe are unchanged and c/w distal airway impaction. \par

## 2021-10-04 NOTE — PHYSICAL EXAM
[General Appearance - Alert] : alert [General Appearance - In No Acute Distress] : in no acute distress [Sclera] : the sclera and conjunctiva were normal [PERRL With Normal Accommodation] : pupils were equal in size, round, reactive to light [Neck Appearance] : the appearance of the neck was normal [FreeTextEntry1] : port right anterior chest [] : no respiratory distress [Auscultation Breath Sounds / Voice Sounds] : lungs were clear to auscultation bilaterally [Heart Rate And Rhythm] : heart rate was normal and rhythm regular [Heart Sounds] : normal S1 and S2 [Heart Sounds Gallop] : no gallops [Murmurs] : no murmurs [Heart Sounds Pericardial Friction Rub] : no pericardial rub [Bowel Sounds] : normal bowel sounds [Abdomen Soft] : soft [No Palpable Adenopathy] : no palpable adenopathy [Skin Lesions] : no skin lesions [No Focal Deficits] : no focal deficits [Oriented To Time, Place, And Person] : oriented to person, place, and time [Affect] : the affect was normal

## 2021-10-05 ENCOUNTER — APPOINTMENT (OUTPATIENT)
Dept: UROLOGY | Facility: CLINIC | Age: 71
End: 2021-10-05
Payer: MEDICARE

## 2021-10-05 DIAGNOSIS — L59.8 OTHER SPECIFIED DISORDERS OF THE SKIN AND SUBCUTANEOUS TISSUE RELATED TO RADIATION: ICD-10-CM
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
## 2021-10-05 DIAGNOSIS — R31.9 HEMATURIA, UNSPECIFIED: ICD-10-CM

## 2021-10-05 LAB
APPEARANCE: ABNORMAL
BILIRUBIN URINE: NEGATIVE
BLOOD URINE: ABNORMAL
COLOR: ABNORMAL
GLUCOSE QUALITATIVE U: NEGATIVE
KETONES URINE: NEGATIVE
LEUKOCYTE ESTERASE URINE: NEGATIVE
NITRITE URINE: NEGATIVE
PH URINE: 6
PROTEIN URINE: NORMAL
SPECIFIC GRAVITY URINE: 1
UROBILINOGEN URINE: NORMAL

## 2021-10-05 PROCEDURE — 99204 OFFICE O/P NEW MOD 45 MIN: CPT

## 2021-10-05 NOTE — ASSESSMENT
[FreeTextEntry1] : 10/05/2021: Ms. Bloom is a 70y/o F presenting today for evaluation of gross hematuria. Noticed gross hematuria a couple of weeks ago. States urine yesterday was bright bloody red. Today's urine is duller red which is how it looked when symptoms began. Denies difficulty emptying bladder, dysuria, straining to urinate, urinary urgency, urinary incontinence. Hx of kidney stones. Reviewed 7/26/21 CT scan which showed 3 mm nonobstructing left renal calculus, left renal cysts. Reviewed 10/4/21 UA showing 55 RBC/HPF. Hx of colorectal cancer. Hx of asthma and diabetes. Has a port. +FHx of stomach, lung, breast, kidney (aunt), skin, and colon cancer. \par \par Patient will schedule for cystoscopy for evaluation of gross hematuria. \par Instructed to discuss with cardiologist if she is able to come off of Eliquis 3 days before cystoscopy. \par \par The patient produced a dark red urine sample which will be sent for urinalysis, urine cytology, and urine culture. \par \par RTO in 1 week for cystoscopy. \par \par Preparation, in-person office visit, and coordination of care took: 45 minutes

## 2021-10-05 NOTE — LETTER BODY
[Dear  ___] : Dear  [unfilled], [Consult Letter:] : I had the pleasure of evaluating your patient, [unfilled]. [Please see my note below.] : Please see my note below. [Consult Closing:] : Thank you very much for allowing me to participate in the care of this patient.  If you have any questions, please do not hesitate to contact me. [Sincerely,] : Sincerely, [FreeTextEntry2] : Dr. Zach King\par [address] [place] [address]\[place] [address] [FreeTextEntry1] : 10/05/2021: Ms. Bloom is a 72y/o F presenting today for evaluation of gross hematuria. Noticed gross hematuria a couple of weeks ago. States urine yesterday was bright bloody red. Today's urine is duller red which is how it looked when symptoms began. Denies difficulty emptying bladder, dysuria, straining to urinate, urinary urgency, urinary incontinence. Hx of kidney stones. Reviewed 7/26/21 CT scan which showed 3 mm nonobstructing left renal calculus, left renal cysts. Reviewed 10/4/21 UA showing 55 RBC/HPF. Hx of colorectal cancer. Hx of asthma and diabetes. Has a port. +FHx of stomach, lung, breast, kidney (aunt), skin, and colon cancer. \par \par Patient will schedule for cystoscopy for evaluation of gross hematuria. \par Instructed to discuss with cardiologist if she is able to come off of Eliquis 3 days before cystoscopy. \par \par The patient produced a dark red urine sample which will be sent for urinalysis, urine cytology, and urine culture. \par \par RTO in 1 week for cystoscopy.  [FreeTextEntry3] : Stew Gross MD, PhD

## 2021-10-05 NOTE — REVIEW OF SYSTEMS
[Eyesight Problems] : eyesight problems [Dry Eyes] : dryness of the eyes [Sore Throat] : sore throat [Chest Pain] : chest pain [Palpitations] : palpitations [Shortness Of Breath] : shortness of breath [Wheezing] : wheezing [Cough] : cough [Abdominal Pain] : abdominal pain [Vomiting] : vomiting [Constipation] : constipation [Diarrhea] : diarrhea [Heartburn] : heartburn [Genital bacterial infection] : genital bacterial infection [Genital yeast infection] : genital yeast infection [Sexually Transmitted Disease (STD)] : sexually transmitted disease [Urine Infection (bladder/kidney)] : bladder/kidney infection [Told you have blood in urine on a urine test] : told blood was present in a urine test [History of kidney stones] : history of kidney stones [Wake up at night to urinate  How many times?  ___] : wakes up to urinate [unfilled] times during the night [Joint Pain] : joint pain [Joint Swelling] : joint swelling [Itching] : itching [Feelings Of Weakness] : feelings of weakness [Negative] : Heme/Lymph

## 2021-10-05 NOTE — PHYSICAL EXAM
[General Appearance - Well Developed] : well developed [General Appearance - Well Nourished] : well nourished [Normal Appearance] : normal appearance [Well Groomed] : well groomed [General Appearance - In No Acute Distress] : no acute distress [Edema] : no peripheral edema [Abdomen Soft] : soft [Abdomen Tenderness] : non-tender [] : no rash [No Focal Deficits] : no focal deficits [Oriented To Time, Place, And Person] : oriented to person, place, and time [Affect] : the affect was normal [Mood] : the mood was normal [Not Anxious] : not anxious [FreeTextEntry1] : ambulates with rolling walker for assistance

## 2021-10-05 NOTE — HISTORY OF PRESENT ILLNESS
[FreeTextEntry1] : 10/05/2021: Ms. Bloom is a 70y/o F presenting today for evaluation of gross hematuria. Noticed gross hematuria a couple of weeks ago. States urine yesterday was bloody red. Today's  urine is duller red which is how it looked when symptoms began. Denies difficulty emptying bladder, dysuria, straining to urinate, urinary urgency, urinary incontinence. Hx of kidney stones. Reviewed 7/26/21 CT scan which showed 3 mm nonobstructing left renal calculus, left renal cysts. Reviewed 10/4/21 UA showing 55 RBC/HPF. Hx of colorectal cancer. Hx of asthma and diabetes. Has a port. +FHx of stomach, lung, breast, kidney (aunt), skin, and colon cancer. \par \par 10/05/2021: Ms. Bloom is a 70y/o F presenting today for evaluation of gross hematuria. Noticed gross hematuria a couple of weeks ago. States urine yesterday was bright bloody red. Today's urine is duller red which is how it looked when symptoms began. Denies difficulty emptying bladder, dysuria, straining to urinate, urinary urgency, urinary incontinence. Hx of kidney stones. Reviewed 7/26/21 CT scan which showed 3 mm nonobstructing left renal calculus, left renal cysts. Reviewed 10/4/21 UA showing 55 RBC/HPF. Hx of colorectal cancer. Hx of asthma and diabetes. Has a port. +FHx of stomach, lung, breast, kidney (aunt), skin, and colon cancer.

## 2021-10-05 NOTE — ADDENDUM
[FreeTextEntry1] : I, Irish Garciain, acted solely as a scribe for Dr. Stew Gross on this date 10/05/2021.\par \par All medical record entries made by the Scribe were at my, Dr. Stew Gross, direction and personally dictated by me on 10/05/2021. I have reviewed the chart and agree that the record accurately reflects my personal performance of the history, physical exam, assessment and plan.  I have also personally directed, reviewed and agreed with the chart.

## 2021-10-05 NOTE — LETTER HEADER
[FreeTextEntry3] : Stew Gross MD, PhD\par [address], [address]\[place] [address]

## 2021-10-08 ENCOUNTER — NON-APPOINTMENT (OUTPATIENT)
Age: 71
End: 2021-10-08

## 2021-10-08 ENCOUNTER — APPOINTMENT (OUTPATIENT)
Dept: UROLOGY | Facility: CLINIC | Age: 71
End: 2021-10-08

## 2021-10-09 LAB
APPEARANCE: ABNORMAL
BACTERIA UR CULT: NORMAL
BACTERIA: NEGATIVE
BILIRUBIN URINE: ABNORMAL
BLOOD URINE: ABNORMAL
CALCIUM OXALATE CRYSTALS: ABNORMAL
COLOR: ABNORMAL
GLUCOSE QUALITATIVE U: ABNORMAL
HYALINE CASTS: 0 /LPF
KETONES URINE: ABNORMAL
LEUKOCYTE ESTERASE URINE: ABNORMAL
MICROSCOPIC-UA: NORMAL
NITRITE URINE: ABNORMAL
PH URINE: ABNORMAL
PROTEIN URINE: ABNORMAL
RED BLOOD CELLS URINE: >720 /HPF
SPECIFIC GRAVITY URINE: ABNORMAL
SQUAMOUS EPITHELIAL CELLS: 2 /HPF
URINE CYTOLOGY: NORMAL
UROBILINOGEN URINE: ABNORMAL
WHITE BLOOD CELLS URINE: 11 /HPF

## 2021-10-11 ENCOUNTER — APPOINTMENT (OUTPATIENT)
Dept: INTERNAL MEDICINE | Facility: CLINIC | Age: 71
End: 2021-10-11
Payer: MEDICARE

## 2021-10-11 PROCEDURE — 99441: CPT | Mod: 95

## 2021-10-14 ENCOUNTER — APPOINTMENT (OUTPATIENT)
Dept: THORACIC SURGERY | Facility: CLINIC | Age: 71
End: 2021-10-14
Payer: MEDICARE

## 2021-10-14 VITALS
TEMPERATURE: 97.8 F | OXYGEN SATURATION: 94 % | BODY MASS INDEX: 21.97 KG/M2 | DIASTOLIC BLOOD PRESSURE: 76 MMHG | WEIGHT: 140 LBS | SYSTOLIC BLOOD PRESSURE: 196 MMHG | RESPIRATION RATE: 17 BRPM | HEART RATE: 65 BPM | HEIGHT: 67 IN

## 2021-10-14 DIAGNOSIS — J18.9 PNEUMONIA, UNSPECIFIED ORGANISM: ICD-10-CM

## 2021-10-14 LAB
ALBUMIN SERPL ELPH-MCNC: 4.4 G/DL — SIGNIFICANT CHANGE UP (ref 3.3–5)
ALP SERPL-CCNC: 100 U/L — SIGNIFICANT CHANGE UP (ref 40–120)
ALT FLD-CCNC: 17 U/L — SIGNIFICANT CHANGE UP (ref 10–45)
ANION GAP SERPL CALC-SCNC: 10 MMOL/L — SIGNIFICANT CHANGE UP (ref 5–17)
APTT BLD: 41.7 SEC — HIGH (ref 27.5–35.5)
AST SERPL-CCNC: 19 U/L — SIGNIFICANT CHANGE UP (ref 10–40)
BASOPHILS # BLD AUTO: 0.02 K/UL — SIGNIFICANT CHANGE UP (ref 0–0.2)
BASOPHILS NFR BLD AUTO: 0.2 % — SIGNIFICANT CHANGE UP (ref 0–2)
BILIRUB SERPL-MCNC: 0.2 MG/DL — SIGNIFICANT CHANGE UP (ref 0.2–1.2)
BUN SERPL-MCNC: 18 MG/DL — SIGNIFICANT CHANGE UP (ref 7–23)
CALCIUM SERPL-MCNC: 9.4 MG/DL — SIGNIFICANT CHANGE UP (ref 8.4–10.5)
CHLORIDE SERPL-SCNC: 105 MMOL/L — SIGNIFICANT CHANGE UP (ref 96–108)
CO2 SERPL-SCNC: 30 MMOL/L — SIGNIFICANT CHANGE UP (ref 22–31)
CREAT SERPL-MCNC: 0.74 MG/DL — SIGNIFICANT CHANGE UP (ref 0.5–1.3)
EOSINOPHIL # BLD AUTO: 0.03 K/UL — SIGNIFICANT CHANGE UP (ref 0–0.5)
EOSINOPHIL NFR BLD AUTO: 0.3 % — SIGNIFICANT CHANGE UP (ref 0–6)
FUNGUS SPT CULT: ABNORMAL
GLUCOSE SERPL-MCNC: 142 MG/DL — HIGH (ref 70–99)
HCT VFR BLD CALC: 45.7 % — HIGH (ref 34.5–45)
HGB BLD-MCNC: 13.8 G/DL — SIGNIFICANT CHANGE UP (ref 11.5–15.5)
IMM GRANULOCYTES NFR BLD AUTO: 0.8 % — SIGNIFICANT CHANGE UP (ref 0–1.5)
INR BLD: 1.5 — HIGH (ref 0.88–1.16)
LYMPHOCYTES # BLD AUTO: 1.16 K/UL — SIGNIFICANT CHANGE UP (ref 1–3.3)
LYMPHOCYTES # BLD AUTO: 11.5 % — LOW (ref 13–44)
MCHC RBC-ENTMCNC: 23.7 PG — LOW (ref 27–34)
MCHC RBC-ENTMCNC: 30.2 GM/DL — LOW (ref 32–36)
MCV RBC AUTO: 78.5 FL — LOW (ref 80–100)
MONOCYTES # BLD AUTO: 0.56 K/UL — SIGNIFICANT CHANGE UP (ref 0–0.9)
MONOCYTES NFR BLD AUTO: 5.5 % — SIGNIFICANT CHANGE UP (ref 2–14)
NEUTROPHILS # BLD AUTO: 8.26 K/UL — HIGH (ref 1.8–7.4)
NEUTROPHILS NFR BLD AUTO: 81.7 % — HIGH (ref 43–77)
NRBC # BLD: 0 /100 WBCS — SIGNIFICANT CHANGE UP (ref 0–0)
PLATELET # BLD AUTO: 313 K/UL — SIGNIFICANT CHANGE UP (ref 150–400)
POTASSIUM SERPL-MCNC: 4.1 MMOL/L — SIGNIFICANT CHANGE UP (ref 3.5–5.3)
POTASSIUM SERPL-SCNC: 4.1 MMOL/L — SIGNIFICANT CHANGE UP (ref 3.5–5.3)
PROT SERPL-MCNC: 7.7 G/DL — SIGNIFICANT CHANGE UP (ref 6–8.3)
PROTHROM AB SERPL-ACNC: 17.6 SEC — HIGH (ref 10.6–13.6)
RBC # BLD: 5.82 M/UL — HIGH (ref 3.8–5.2)
RBC # FLD: 17.2 % — HIGH (ref 10.3–14.5)
SODIUM SERPL-SCNC: 145 MMOL/L — SIGNIFICANT CHANGE UP (ref 135–145)
WBC # BLD: 10.11 K/UL — SIGNIFICANT CHANGE UP (ref 3.8–10.5)
WBC # FLD AUTO: 10.11 K/UL — SIGNIFICANT CHANGE UP (ref 3.8–10.5)

## 2021-10-14 PROCEDURE — 99213 OFFICE O/P EST LOW 20 MIN: CPT

## 2021-10-15 PROBLEM — J18.9 INFLAMMATION OF LUNG: Status: ACTIVE | Noted: 2017-11-30

## 2021-10-15 NOTE — ASSESSMENT
[FreeTextEntry1] : 71 y/o female with a PMH of DM on insulin, TIA's, adrenal insufficiency, stage III A, T2N1 squamous cell carcinoma of anus, s/p chemo and radiation, tracheobronchomalacia s/p Right VATS, robotic assisted tracheobronchoplasty with Prolene mesh on 10/25/16. She is currently on Eliquis for Afib. She underwent a bronchoscopy with Holmium laser on 2/14/20. Repeat bronchoscopy on 10/2/20 revealed no evidence of TBM. Underwent recent cardiac evaluation with Dr. John who reports that the patient's symptoms are attributed to her pulmonary disease more so than her cardiac. She presents today due to a continuous cough. She presents today for a follow up visit. \par \par Patient admits to having a barking cough that she explains began last week. The cough is productive, however she cannot expectorate the secretions. I advised her to try using the Acapella device. Will encourage an awake bronchoscopy to evaluate tracheal collapse. \par \par The patient was advised on the implications of awake bronchoscopy under local anesthetic. The patient was counseled on the risks, benefits and alternatives of proceeding with this procedure. Risk of bleeding, need for transfusion, nerve injury, and need for additional testing, biopsy and/or treatment discussed.\par \par Patient admits to having episodes of a hematuria lately. Her Urologist, Dr. Flores is planning on performing a cystoscopy. Will attempt to speak with Dr. Gary Goldberg regarding patient's symptoms.  The patient was provided with Dr. Goldberg's office number and was instructed to contact Dr. Goldberg in order to evaluate and potentially perform cystoscopy in an hospital setting as opposed to performing the cystoscopy in the office as her current urologist only perform cystoscopy in the office.\par \par Plan:\par 1. Awake bronchoscopy 10/22/21\par \par I, DIOR BOX , am scribing for and in the presence of [Dr. Zohaib Zapien] the following sections: History of present illness, past Medical/family/surgical/family/social history, review of systems, vital signs, physical exam and disposition.\par \par EVERETTE, Zohaib Zapien, have reviewed the documentation recorded by the scribe in my presence and it accurately and completely records my words and actions.\par

## 2021-10-15 NOTE — HISTORY OF PRESENT ILLNESS
[FreeTextEntry1] : 69 y/o female with a PMH of DM on insulin, TIA's, adrenal insuficiency, stage III A, T2N1 squamous cell carcinoma of anus, s/p chemo and radiation, tracheobronchomalacia s/p Right VATS, robotic assisted tracheobronchoplasty with Prolene mesh on 10/25/16. She is currently on Eliquis for Afib. She underwent a bronchoscopy with Holmium laser on 2/14/20. Repeat bronchoscopy on 10/2/20 revealed no evidence of TBM. Underwent recent cardiac evaluation with Dr. John who reports that the patient's symptoms are attributed to her pulmonary disease more so than her cardiac. She presents today due to a continuous cough. She presents today for a follow up visit. \par \par ECHO completed on 01/23/20:\par -mild annular calcification, otherwise normal mitral valve. Minimal to mild mitral regurgitation\par -thickened aortic valve. Moderate-severe aortic regurgitation\par -mild aortic root dilation\par -mild concentric left ventricular hypertrophy\par -normal left ventricular systolic function. No segmental wall motion abnormalities\par -mild tricuspid regurgitation\par -minimal pulmonic regurgitation\par -normal pericardium with trace pericardial effusion \par \par PFT done on 8/12/20 showed FEV1 = 1.31 ,58 % of predicted value, FVC = 2.42 ,80 % of predicted value, PEF = 4.87 , 84% of predicted value and DLCO =17.8 , 93% of predicted value.\par  \par CT chest completed on 8/19/20:\par - The trachea is narrowed by just greater than 70% during expiration. This could suggest underlying tracheomalacia.\par - No tracheal nodularity or calcification. No bronchiectasis. Secretions in the trachea, right mainstem bronchus, and bronchus intermedius.\par \par CT abdomen and pelvis completed on 8/30/20:\par No new findings.\par A 4 mm right upper lobe pulmonary nodule on prior study is not visualized\par \par PFT done on 1/12/21 showed FEV1 = 1.04, 51% of predicted value, FVC =2.06 ,78 % of predicted value, PEF = 3.92, 76% of predicted value and DLCO = , % of predicted value.\par \par stress ECHO on 1/12/2021:\par The ejection fraction was 70-75%. There was moderate aortic regurgitation. POST-EXERCISE:Overall left ventricular contractility increased with exercise. Segmental wall motion abnormalities were not seen. With stress, normal augmentation of all left ventricular wall segments noted, end systolic volume decreased and the ejection fraction increased. With stress, the left ventricular ejection fraction was >75%. The echocardiogram is non-diagnostic due to inadequate heart rate-systolic blood pressure product achieved however, no ischemic changes seen at 83 % of maximum heart rate achieved.\par \par CPET on 1/12/2021:\par - the test is consistent with ventilatory limitation in the setting of known obstructive lung disease, with possible cardiac limitation. \par - V02 max 16.1\par \par Cardiac MRI on 1/5/2021:\par 1. The left ventricle (LV) is normal in size. There is no evidence of LV hypertrophy. Left ventricular global systolic function is reduced The LV ejection fraction is 72 %.2. There is Moderate to Severe aortic insufficiency. The right ventricle (RV) is normal in size. RV global systolic function is Normal. No significant abnormalities of the visualized portions of the great vessels. \par \par CT  chest and abdomen completed on 07/26/21:\par -secretions within the right and left mainstem bronchi. Clusters of tiny nodules in the distal right middle lobe and right lower lobe are unchanged and consistent with distal airway impaction. \par \par PFT done on 07/21/21 showed FEV1 = 1.18, 52% of predicted value, FVC = 2.37, 76% of predicted value, PEF = 4.83, 82% of predicted value and DLCO = 19.9, 102% of predicted value.\par

## 2021-10-19 ENCOUNTER — LABORATORY RESULT (OUTPATIENT)
Age: 71
End: 2021-10-19

## 2021-10-20 ENCOUNTER — NON-APPOINTMENT (OUTPATIENT)
Age: 71
End: 2021-10-20

## 2021-10-20 ENCOUNTER — APPOINTMENT (OUTPATIENT)
Dept: INTERNAL MEDICINE | Facility: CLINIC | Age: 71
End: 2021-10-20

## 2021-10-21 ENCOUNTER — TRANSCRIPTION ENCOUNTER (OUTPATIENT)
Age: 71
End: 2021-10-21

## 2021-10-21 DIAGNOSIS — Z20.822 CONTACT WITH AND (SUSPECTED) EXPOSURE TO COVID-19: ICD-10-CM

## 2021-10-22 ENCOUNTER — APPOINTMENT (OUTPATIENT)
Dept: THORACIC SURGERY | Facility: HOSPITAL | Age: 71
End: 2021-10-22

## 2021-10-22 ENCOUNTER — OUTPATIENT (OUTPATIENT)
Dept: OUTPATIENT SERVICES | Facility: HOSPITAL | Age: 71
LOS: 1 days | Discharge: HOME | End: 2021-10-22
Payer: MEDICARE

## 2021-10-22 DIAGNOSIS — K62.5 HEMORRHAGE OF ANUS AND RECTUM: Chronic | ICD-10-CM

## 2021-10-22 DIAGNOSIS — Z96.653 PRESENCE OF ARTIFICIAL KNEE JOINT, BILATERAL: Chronic | ICD-10-CM

## 2021-10-22 DIAGNOSIS — H05.352 EXOSTOSIS OF LEFT ORBIT: Chronic | ICD-10-CM

## 2021-10-22 DIAGNOSIS — Z98.89 OTHER SPECIFIED POSTPROCEDURAL STATES: Chronic | ICD-10-CM

## 2021-10-22 DIAGNOSIS — Z87.09 PERSONAL HISTORY OF OTHER DISEASES OF THE RESPIRATORY SYSTEM: Chronic | ICD-10-CM

## 2021-10-22 DIAGNOSIS — Z98.890 OTHER SPECIFIED POSTPROCEDURAL STATES: Chronic | ICD-10-CM

## 2021-10-22 LAB
GRAM STN FLD: SIGNIFICANT CHANGE UP
SPECIMEN SOURCE: SIGNIFICANT CHANGE UP

## 2021-10-22 PROCEDURE — 31622 DX BRONCHOSCOPE/WASH: CPT

## 2021-10-22 PROCEDURE — 31645 BRNCHSC W/THER ASPIR 1ST: CPT

## 2021-10-22 PROCEDURE — 87070 CULTURE OTHR SPECIMN AEROBIC: CPT

## 2021-10-24 LAB
CULTURE RESULTS: SIGNIFICANT CHANGE UP
SPECIMEN SOURCE: SIGNIFICANT CHANGE UP

## 2021-10-26 NOTE — ED PROVIDER NOTE - CADM POA URETHRAL CATHETER
Patient is COVID +, symptoms since 10/18, was seen here and enrolled in get well loop, returns with chest pain, and noted O2 at 88% at home.   
No

## 2021-10-27 NOTE — PRE PROCEDURE NOTE - PRE PROCEDURE EVALUATION
Attending Physician:       Chava Hall                     Procedure: Colonoscopy    Indication for Procedure: History of anal cancer  ________________________________________________________  PAST MEDICAL & SURGICAL HISTORY:  Atrial fibrillation  paroxysmal, on eliquis    Hyperlipidemia    Diabetes  Type 2    COPD (chronic obstructive pulmonary disease)    Adrenal insufficiency  Medrol daily for over 50 years    Aortic insufficiency  moderate AR on echo 5/3/2018    Pelvic fracture    Asthma    Tracheobronchomalacia  diagnosed 2015, s/p bronchial thermoplasty 2016 (Dr Zapien); recent bronchoscopy 6/5/2018 revealed no evidence of tracheobronchomalacia in trachea or bronchial tubes    Anal cancer  4/2018- last treatment , chemo and radiation    Rectal bleeding    Seizure  x 1 1/7/18    DVT (deep venous thrombosis)  15-20 years ago, took coumadin    TIA (transient ischemic attack)  multiple, last 5 years ago - presents as right-sided weakness    History of partial hysterectomy  30 years ago - fibroids    H/O total knee replacement, bilateral  5 years ago    History of sinus surgery  multiple sinus surgeries    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    H/O pelvic surgery  5 years ago - s/p fracture    History of tracheomalacia  2015 - attempted tracheal stenting ([facility] course complicated by obstruction, respiratory failure, multiple CPR attempts -  stent discontinued; 10/20/2016 Tracheobronchoplasty (Prolene Mesh) performed at [facility] by Dr Zapien    S/P bronchoscopy  6/5/2018 - [facility] (Dr Zapien) no evidence of tracheobronchomalacia in trachea or bronchial tubes    Rectal bleeding  exam under anesthesia ([facility]) 2/2018      ALLERGIES:  ampicillin (Short breath)  aspirin (Short breath)  Avelox (Short breath; Pruritus)  codeine (Short breath)  Dilaudid (Short breath)  iodine (Short breath; Swelling)  penicillin (Short breath)  shellfish (Anaphylaxis)  tetanus toxoid (Short breath)  Valium (Short breath)    HOME MEDICATIONS:  apixaban 5 mg oral tablet: 1 tab(s) orally every 12 hours  Breo Ellipta 200 mcg-25 mcg/inh inhalation powder: 1 puff(s) inhaled once a day  Crestor 5 mg oral tablet: 1 tab(s) orally once a day (at bedtime)  Daliresp 250 mcg oral tablet: 1 tab(s) orally once a day  dilTIAZem 30 mg oral tablet: 2 tab(s) orally every 12 hours  Dupixent 300 mg/2 mL subcutaneous solution: 1  subcutaneous every 2 weeks  HumaLOG 100 units/mL subcutaneous solution: Sliding Scale  ipratropium-albuterol 0.5 mg-2.5 mg/3 mLinhalation solution: 3 milliliter(s) inhaled every 6 hours  Lantus 100 units/mL subcutaneous solution: 15 unit(s) subcutaneous once (at bedtime)  methylPREDNISolone 4 mg oral tablet: 1 tab(s) orally once a day  MiraLax oral powder for reconstitution: 17 gram(s) orally once a day  Senna 8.6 mg oral tablet: 1 tab(s) orally once a day (at bedtime)  Singulair 10 mg oral tablet: 1 tab(s) orally once a day  Spiriva 18 mcg inhalation capsule: 1 cap(s) inhaled once a day  Victoza 18 mg/3 mL subcutaneous solution: 1.8 milligram(s) subcutaneous once a day  Zofran 8 mg oral tablet: 1 tab(s) orally 3 times a day, As Needed    AICD/PPM: [ ] yes   [x ] no    PERTINENT LAB DATA: COVID-19 test negative                      PHYSICAL EXAMINATION:    T(C): --  HR: --  BP: --  RR: --  SpO2: --  Wt: -- 139 lb  Gastrointestinal: NT  Neurological: A/O x 3, no focal deficits      ASA Class: I [ ]  II [ ]  III [x ]  IV [ ]    COMMENTS:    The patient is a suitable candidate for the planned procedure unless box checked [ ]  No, explain:     Attending Physician:       Chava Hall                     Procedure: Colonoscopy    Indication for Procedure: History of anal cancer  ________________________________________________________  PAST MEDICAL & SURGICAL HISTORY:  Atrial fibrillation  paroxysmal, on eliquis    Hyperlipidemia    Diabetes  Type 2    COPD (chronic obstructive pulmonary disease)    Adrenal insufficiency  Medrol daily for over 50 years    Aortic insufficiency  moderate AR on echo 5/3/2018    Pelvic fracture    Asthma    Tracheobronchomalacia  diagnosed 2015, s/p bronchial thermoplasty 2016 (Dr Zapien); recent bronchoscopy 6/5/2018 revealed no evidence of tracheobronchomalacia in trachea or bronchial tubes    Anal cancer  4/2018- last treatment , chemo and radiation    Rectal bleeding    Seizure  x 1 1/7/18    DVT (deep venous thrombosis)  15-20 years ago, took coumadin    TIA (transient ischemic attack)  multiple, last 5 years ago - presents as right-sided weakness    History of partial hysterectomy  30 years ago - fibroids    H/O total knee replacement, bilateral  5 years ago    History of sinus surgery  multiple sinus surgeries    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    H/O pelvic surgery  5 years ago - s/p fracture    History of tracheomalacia  2015 - attempted tracheal stenting ([facility] course complicated by obstruction, respiratory failure, multiple CPR attempts -  stent discontinued; 10/20/2016 Tracheobronchoplasty (Prolene Mesh) performed at [facility] by Dr Zapien    S/P bronchoscopy  6/5/2018 - [facility] (Dr Zapien) no evidence of tracheobronchomalacia in trachea or bronchial tubes    Rectal bleeding  exam under anesthesia ([facility]) 2/2018      ALLERGIES:  ampicillin (Short breath)  aspirin (Short breath)  Avelox (Short breath; Pruritus)  codeine (Short breath)  Dilaudid (Short breath)  iodine (Short breath; Swelling)  penicillin (Short breath)  shellfish (Anaphylaxis)  tetanus toxoid (Short breath)  Valium (Short breath)    HOME MEDICATIONS:  apixaban 5 mg oral tablet: 1 tab(s) orally every 12 hours  Breo Ellipta 200 mcg-25 mcg/inh inhalation powder: 1 puff(s) inhaled once a day  Crestor 5 mg oral tablet: 1 tab(s) orally once a day (at bedtime)  Daliresp 250 mcg oral tablet: 1 tab(s) orally once a day  dilTIAZem 30 mg oral tablet: 2 tab(s) orally every 12 hours  Dupixent 300 mg/2 mL subcutaneous solution: 1  subcutaneous every 2 weeks  HumaLOG 100 units/mL subcutaneous solution: Sliding Scale  ipratropium-albuterol 0.5 mg-2.5 mg/3 mLinhalation solution: 3 milliliter(s) inhaled every 6 hours  Lantus 100 units/mL subcutaneous solution: 15 unit(s) subcutaneous once (at bedtime)  methylPREDNISolone 4 mg oral tablet: 1 tab(s) orally once a day  MiraLax oral powder for reconstitution: 17 gram(s) orally once a day  Senna 8.6 mg oral tablet: 1 tab(s) orally once a day (at bedtime)  Singulair 10 mg oral tablet: 1 tab(s) orally once a day  Spiriva 18 mcg inhalation capsule: 1 cap(s) inhaled once a day  Victoza 18 mg/3 mL subcutaneous solution: 1.8 milligram(s) subcutaneous once a day  Zofran 8 mg oral tablet: 1 tab(s) orally 3 times a day, As Needed    AICD/PPM: [ ] yes   [x ] no    PERTINENT LAB DATA: COVID-19 test negative                      PHYSICAL EXAMINATION:    T(C): --  HR: -- 79  BP: -- 126/73  RR: --  SpO2: --  Wt: -- 139 lb  Gastrointestinal: NT, left-sided colostomy  Neurological: A/O x 3, no focal deficits      ASA Class: I [ ]  II [ ]  III [x ]  IV [ ]    COMMENTS:    The patient is a suitable candidate for the planned procedure unless box checked [ ]  No, explain:

## 2021-10-28 ENCOUNTER — OUTPATIENT (OUTPATIENT)
Dept: OUTPATIENT SERVICES | Facility: HOSPITAL | Age: 71
LOS: 1 days | End: 2021-10-28
Payer: MEDICARE

## 2021-10-28 ENCOUNTER — RESULT REVIEW (OUTPATIENT)
Age: 71
End: 2021-10-28

## 2021-10-28 ENCOUNTER — RX RENEWAL (OUTPATIENT)
Age: 71
End: 2021-10-28

## 2021-10-28 VITALS
DIASTOLIC BLOOD PRESSURE: 79 MMHG | OXYGEN SATURATION: 98 % | SYSTOLIC BLOOD PRESSURE: 101 MMHG | RESPIRATION RATE: 24 BRPM | HEART RATE: 73 BPM

## 2021-10-28 VITALS
OXYGEN SATURATION: 99 % | DIASTOLIC BLOOD PRESSURE: 73 MMHG | RESPIRATION RATE: 19 BRPM | WEIGHT: 139.99 LBS | HEIGHT: 66 IN | HEART RATE: 77 BPM | TEMPERATURE: 96 F | SYSTOLIC BLOOD PRESSURE: 126 MMHG

## 2021-10-28 DIAGNOSIS — Z86.010 PERSONAL HISTORY OF COLONIC POLYPS: ICD-10-CM

## 2021-10-28 DIAGNOSIS — Z87.09 PERSONAL HISTORY OF OTHER DISEASES OF THE RESPIRATORY SYSTEM: Chronic | ICD-10-CM

## 2021-10-28 DIAGNOSIS — Z98.89 OTHER SPECIFIED POSTPROCEDURAL STATES: Chronic | ICD-10-CM

## 2021-10-28 DIAGNOSIS — Z98.890 OTHER SPECIFIED POSTPROCEDURAL STATES: Chronic | ICD-10-CM

## 2021-10-28 DIAGNOSIS — Z11.59 ENCOUNTER FOR SCREENING FOR OTHER VIRAL DISEASES: ICD-10-CM

## 2021-10-28 DIAGNOSIS — H05.352 EXOSTOSIS OF LEFT ORBIT: Chronic | ICD-10-CM

## 2021-10-28 DIAGNOSIS — K62.5 HEMORRHAGE OF ANUS AND RECTUM: Chronic | ICD-10-CM

## 2021-10-28 DIAGNOSIS — Z96.653 PRESENCE OF ARTIFICIAL KNEE JOINT, BILATERAL: Chronic | ICD-10-CM

## 2021-10-28 DIAGNOSIS — Z85.048 PERSONAL HISTORY OF OTHER MALIGNANT NEOPLASM OF RECTUM, RECTOSIGMOID JUNCTION, AND ANUS: ICD-10-CM

## 2021-10-28 LAB — GLUCOSE BLDC GLUCOMTR-MCNC: 156 MG/DL — HIGH (ref 70–99)

## 2021-10-28 PROCEDURE — 45385 COLONOSCOPY W/LESION REMOVAL: CPT

## 2021-10-28 PROCEDURE — 88305 TISSUE EXAM BY PATHOLOGIST: CPT

## 2021-10-28 PROCEDURE — 88305 TISSUE EXAM BY PATHOLOGIST: CPT | Mod: 26

## 2021-10-28 PROCEDURE — 82962 GLUCOSE BLOOD TEST: CPT

## 2021-10-28 PROCEDURE — C1889: CPT

## 2021-10-28 PROCEDURE — 45381 COLONOSCOPY SUBMUCOUS NJX: CPT

## 2021-10-28 RX ORDER — SODIUM CHLORIDE 9 MG/ML
500 INJECTION INTRAMUSCULAR; INTRAVENOUS; SUBCUTANEOUS
Refills: 0 | Status: COMPLETED | OUTPATIENT
Start: 2021-10-28 | End: 2021-10-28

## 2021-10-28 RX ORDER — ROFLUMILAST 500 UG/1
1 TABLET ORAL
Qty: 0 | Refills: 0 | DISCHARGE

## 2021-10-28 RX ORDER — DUPILUMAB 300 MG/2ML
1 INJECTION, SOLUTION SUBCUTANEOUS
Qty: 0 | Refills: 0 | DISCHARGE

## 2021-10-28 RX ADMIN — SODIUM CHLORIDE 30 MILLILITER(S): 9 INJECTION INTRAMUSCULAR; INTRAVENOUS; SUBCUTANEOUS at 08:20

## 2021-10-28 NOTE — ASU DISCHARGE PLAN (ADULT/PEDIATRIC) - NSDISCH_BIOPSY_ENDO_ALL_CORE_FT
If you had a biopsy, you should not take aspirin or aspirin like products for the next 10 days unless instructed to do so by your doctor. If you had a biopsy, check with your doctor before taking any blood thinners such as warfarin (Coumadin). Resume taking Eliquis tomorrow if there are no signs of bleeding.

## 2021-10-28 NOTE — ASU DISCHARGE PLAN (ADULT/PEDIATRIC) - NSDISCH_COLONOSCOPY_ENDO_ALL_CORE_FT
If a colonoscopy was performed you might notice a few drops of blood in the ostomy bag. This is caused by irritation to the bowel during the procedure and is not a problem. However if you have any more heavy bleeding (more than 2 tablespoons of bright red blood) contact your doctor right away.

## 2021-10-28 NOTE — ASU PATIENT PROFILE, ADULT - NSICDXPASTSURGICALHX_GEN_ALL_CORE_FT
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    H/O pelvic surgery 5 years ago - s/p fracture    H/O total knee replacement, bilateral 5 years ago    History of partial hysterectomy 30 years ago - fibroids    History of sinus surgery multiple sinus surgeries    History of tracheomalacia 2015 - attempted tracheal stenting ([facility])- course complicated by obstruction, respiratory failure, multiple CPR attempts -  stent discontinued; 10/20/2016 Tracheobronchoplasty (Prolene Mesh) performed at [facility] by Dr Zapien    Rectal bleeding exam under anesthesia ([facility]) 2/2018    S/P bronchoscopy 6/5/2018 - [facility] (Dr Zapien) no evidence of tracheobronchomalacia in trachea or bronchial tubes

## 2021-10-29 NOTE — PHYSICAL THERAPY INITIAL EVALUATION ADULT - ASSISTIVE DEVICE FOR TRANSFER: STAND/SIT, REHAB EVAL
Pt updated on results of testosterone level. testim rx sent as listed in encounter. psa and testosterone orders entered and will have labs drawn in 3 months. No further questions from pt.    rolling walker

## 2021-11-01 ENCOUNTER — APPOINTMENT (OUTPATIENT)
Dept: PULMONOLOGY | Facility: CLINIC | Age: 71
End: 2021-11-01
Payer: MEDICARE

## 2021-11-01 VITALS
SYSTOLIC BLOOD PRESSURE: 138 MMHG | HEART RATE: 79 BPM | BODY MASS INDEX: 22.6 KG/M2 | WEIGHT: 144 LBS | OXYGEN SATURATION: 97 % | HEIGHT: 67 IN | RESPIRATION RATE: 16 BRPM | DIASTOLIC BLOOD PRESSURE: 72 MMHG | TEMPERATURE: 97 F

## 2021-11-01 PROCEDURE — 99214 OFFICE O/P EST MOD 30 MIN: CPT

## 2021-11-01 NOTE — PHYSICAL EXAM
[No Acute Distress] : no acute distress [Normal Oropharynx] : normal oropharynx [II] : Mallampati Class: II [Normal Appearance] : normal appearance [No Neck Mass] : no neck mass [Normal Rate/Rhythm] : normal rate/rhythm [Normal S1, S2] : normal s1, s2 [No Murmurs] : no murmurs [No Resp Distress] : no resp distress [Clear to Auscultation Bilaterally] : clear to auscultation bilaterally [No Abnormalities] : no abnormalities [Benign] : benign [Normal Gait] : normal gait [No Clubbing] : no clubbing [No Cyanosis] : no cyanosis [No Edema] : no edema [FROM] : FROM [Normal Color/ Pigmentation] : normal color/ pigmentation [No Focal Deficits] : no focal deficits [Oriented x3] : oriented x3 [Normal Affect] : normal affect [TextBox_54] : 2/6 systolic murmur  [TextBox_68] : I:E 1:3; Clear

## 2021-11-01 NOTE — ASSESSMENT
[FreeTextEntry1] : Ms. Bloom is a 70 year old female with a history of mm, rectal CA, AVDz, asthma, allergy, GERD, TBM, s/p multiple pneumonias, who presents- intermittent SOB when @rest \par \par Her chronic SOB which is multifactorial due to:\par - tracheomalacia (s/p tracheoplasty with residual frequent mucus production, though patient is non-compliant with vest therapy)\par - chronic bronchitis\par - asthma\par - allergic rhinitis\par - GERD\par - poor breathing mechanics \par - CAD/AV disease, arrhythmia, electrolyte issue\par \par \par problem 1a: severe persistent asthma -(steroid dependent) - Stable\par -continue Medrol 4 mg qd\par -continue to use albuterol via the nebulizer QID \par -followed by Mucomyst QiD\par -followed by the acapella device/ chest vest therapy \par -(1st) followed by Perforomist via the nebulizer BID\par -(2nd) followed by Budesonide 0.5% via the nebulizer BID \par -continue to use Breo Ellipta 200 at 1 inhalation QD \par -continue to use Spiriva 1 inhalation QD\par -failed Xolair 225 injection; follow up injections every 2 weeks - Dupixent initiated (12.3.19) - to continue 300 every 2 weeks. \par -continue to use Accolate 20 mg BID\par -Information sheet given about prednisone to the patient to be reviewed, this medication is never to be used without consulting the prescribing physician. Proper dietary restraint is necessary specifically salt containing foods, if any reaction may occur should be reported. \par -Asthma is believed to be caused by inherited (genetic) and environmental factor, but its exact cause is unknown. Asthma may be triggered by allergens, lung infections, or irritants in the air. Asthma triggers are different for each person\par -Inhaler technique reviewed as well as oral hygiene techniques reviewed with patient. Avoidance of cold air, extremes of temperature, rescue inhaler should be used before exercise. Order of medication reviewed with patient. Recommended use of a cool mist humidifier in the bedroom. \par \par problem 1B: Headache? Sinus \par -Complete MRI of the brain \par \par \par problem 2: tracheomalacia, residual bronchomalacia \par -s/p tracheoplasty with Dr. [place] Zapien\par -laser Bronchoscopy pending\par -continue to follow up \par -s/p f/u Dynamic chest CT 10/2019 positive w TBM - repeat \par \par problem 3: chronic bronchitis and mucus clearance\par -continue to use acapella device multiple times daily\par -recommended to use chest vest therapy multiple times daily \par -she is being sent for sputum cultures \par Patient has a chronic cough greater than 6 months, tried and failed manual chest physiotherapy at home, no skilled caregiver available at home to perform manual CPT, tried and failed acapella vibratory physiotherapy, and recommended chest vest therapy \par \par Problem 3A: Multiple infections\par -off Tobramycin BID for 1 month (completed 12/20/19)\par -Complete follow up Sputum culture after completing ABx if needed. \par \par Problem 3B: multi myeloma\par -appointment  on 1/28/2020\par \par problem 4: GERD\par -continue to use Protonix 40 mg before breakfast\par -continue Baclofen 10 mg q-meal\par -Rule of 2s: avoid eating too much, eating too late, eating too spicy, eating two hours before bed\par -Things to avoid including overeating, spicy foods, tight clothing, eating within three hours of bed, this list is not all inclusive. \par -For treatment of reflux, possible options discussed including diet control, H2 blockers, PPIs, as well as coating motility agents discussed as treatment options. Timing of meals and proximity of last meal to sleep were discussed. If symptoms persist, a formal gastrointestinal evaluation is needed. \par \par problem 5: allergic rhinitis \par -continue to use nasal saline\par -continue to use Xyzal 5 mg before bed\par -Environmental measures for allergies were encouraged including mattress and pillow cover, air purifier, and environmental controls. \par \par problem 6: hx of abnormal aortic valve / cardiac health - arrhythmia \par -continue to follow up with Dr. Leahy, AV Disease, AF\par -echocardiogram in June 2018  (Tosin); Kale Tristan for f/u, Ismail\par \par problem 7: colon cancer\par -s/p radiation therapy, surgery \par -continue to use chemotherapy follow up with Dr. King or Dr. Mario\par \par problem 8: poor breathing mechanics\par -Recommended Wim Hof and Buteyko breathing techniques \par -Proper breathing techniques were reviewed with an emphasis of exhalation. Patient instructed to breath in for 1 second and out for four seconds. Patient was encouraged to not talk while walking.\par \par problem 9: immunodeficiency\par - Due to the fact that this pt has had more infections than would be expected and immunological blood work is indicated this would include: IgG subclasses, quantitative immunoglobulins, Strep pneumoniae titers as well as Vitamin D levels. Based on this blood work we will be able to decide where the pt needs additional pneumococcal vaccine either Prevnar 13 or pneumovax. Immunology evaluation will also be potentially indicated.\par \par problem 10: r/o immunodeficiency (on Medrol)\par -Due to the fact that this pt has had more infections than would be expected and immunological blood work is indicated this would include: IgG subclasses, quantitative immunoglobulins, Strep pneumoniae titers as well as Vitamin D levels.\par -Based on this blood work we will be able to decide where the pt needs additional pneumococcal vaccine either Prevnar 13 or pneumovax. Immunology evaluation will also be potentially indicated. \par \par problem 11: abnormal chest CT- ? new nodule- likely inflammation \par - F/u PET/CT 4/2019- if changed Bx (Rupali); follow up and re-evaluate as per Rupali\par -questionable DIEUDONNE or HERMELINDO, all sputum is negative \par -CAT scans are the only radiological modality to identify abnormalities w/in the lungs with regards to nodules/masses/lymph nodes. Risks, benefits were reviewed in detail. The guidelines for abnormalities include follow up CT scans at various intervals which could range from 6 weeks to 1 year intervals. If there is a change for the worse then consideration for a biopsy will be considered if you are a candidate. Second opinion evaluation with a thoracic surgeon or an interventional radiologist could be offered. 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 It sometimes develops following a URI, herpes zoster outbreak in pharynx or thyroid or cervical spine injury. However, many patients have no identifiable antecedent explanation. \par \par Problem 14: Health Maintenance/COVID19 Precautions \par -s/p  Moderna COVID 19 vaccine x 2 (#3 needed) \par - Clean your hands often. Wash your hands often with soap and water for at least 20 seconds, especially after blowing your nose, coughing, or sneezing, or having been in a public place.\par - If soap and water are not available, use a hand  that contains at least 60% alcohol.\par - To the extent possible, avoid touching high-touch surfaces in public places - elevator buttons, door handles, handrails, handshaking with people, etc. Use a tissue or your sleeve to cover your hand or finger if you must touch something.\par - Wash your hands after touching surfaces in public places.\par - Avoid touching your face, nose, eyes, etc.\par - Clean and disinfect your home to remove germs: practice routine cleaning of frequently touched surfaces (for example: tables, doorknobs, light switches, handles, desks, toilets, faucets, sinks & cell phones)\par - Avoid crowds, especially in poorly ventilated spaces. Your risk of exposure to respiratory viruses like COVID-19 may increase in crowded, closed-in settings with little air circulation if there are people in the crowd who are sick. All patients are recommended to practice social distancing and stay at least 6 feet away from others. \par - Avoid all non-essential travel including plane trips, and especially avoid embarking on cruise ships.\par -If COVID-19 is spreading in your community, take extra measures to put distance between yourself and other people to further reduce your risk of being exposed to this new virus.\par -Stay home as much as possible.\par - Consider ways of getting food brought to your house through family, social, or commercial networks\par -Be aware that the virus has been known to live in the air up to 3 hours post exposure, cardboard up to 24 hours post exposure, copper up to 4 hours post exposure, steel and plastic up to 2-3 days post exposure. Risk of transmission from these surfaces are affected by many variables.\par COVID-19 precautionary Immune Support Recommendations:\par -OTC Vitamin C 500mg BID \par -OTC Quercetin 250-500mg BID \par -OTC Zinc 75-100mg per day \par -OTC Melatonin 1 or 2mg a night \par -OTC Vitamin D 1-4000mg per day \par -OTC Tonic Water 8oz per day\par -Water 0.5-1 gallon per day\par Asthma and COVID19:\par You need to make sure your asthma is under control. This often requires the use of inhaled corticosteroids (and sometimes oral corticosteroids). Inhaled corticosteroids do not likely reduce your immune system’s ability to fight infections, but oral corticosteroids may. It is important to use the steps above to protect yourself to limit your exposure to any respiratory virus. \par \par problem 15:  health maintenance \par -s/p flu shot 10/30/2020\par -6/2021 Shingeles: Valacyclovir in progress \par -recommended strep pneumonia vaccines: Prevnar-13 vaccine, followed by Pneumo vaccine 23 one year following\par -recommended early intervention for URIs\par -recommended regular osteoporosis evaluations\par -recommended early dermatological evaluations\par -recommended after the age of 50 to the age of 70, colonoscopy every 5 years \par \par F/U in 6 weeks - SPI / NIOX\par She is encouraged to call with any changes, concerns, or questions.

## 2021-11-01 NOTE — HISTORY OF PRESENT ILLNESS
[FreeTextEntry1] : Ms. Bloom is a 71 year old female with a history of abnormal CXR/chest CT, allergic rhinitis, severe persistent asthma, bronchiectasis, GERD, COPD, recurrent PNA, PND, s/p tracheoplasty, TBM, and SOB presenting to the office today for a follow up visit. Her chief complaint is\par -she notes sob randomly \par -she notes Dr. Glover is her cardiologist\par -she notes he heart palpitates\par -she notes itchy eyes\par -she denies visual issues\par -she notes stable weight \par -she notes her bowels are regular \par -she notes a few weeks ago she was passing blood in her urine\par -she notes having colonscopy Thursday \par -she notes reflux is okay \par -she notes her blood sugar fluctuates \par -she notes \par patient denies any headaches, nausea, vomiting, fever, chills, sweats, chest pain, chest pressure, palpitations, coughing, wheezing, fatigue, diarrhea, constipation, dysphagia, myalgias, dizziness, leg swelling, leg pain, itchy ears, heartburn, reflux or sour taste in the mouth

## 2021-11-01 NOTE — ADDENDUM
[FreeTextEntry1] : Documented by Chase Dominguez acting as a scribe for Dr. Eulalio Allison on (11/01/2021).\par \par All medical record entries made by the Scribe were at my, Dr. Eulalio Allison's, direction and personally dictated by me on (11/01/2021). I have reviewed the chart and agree that the record accurately reflects my personal performance of the history, physical exam, assessment and plan. I have also personally directed, reviewed, and agree with the discharge instructions.\par

## 2021-11-03 ENCOUNTER — APPOINTMENT (OUTPATIENT)
Dept: CARDIOLOGY | Facility: CLINIC | Age: 71
End: 2021-11-03
Payer: MEDICARE

## 2021-11-03 VITALS
HEART RATE: 64 BPM | OXYGEN SATURATION: 97 % | BODY MASS INDEX: 22.6 KG/M2 | HEIGHT: 67 IN | DIASTOLIC BLOOD PRESSURE: 74 MMHG | WEIGHT: 144 LBS | SYSTOLIC BLOOD PRESSURE: 151 MMHG | RESPIRATION RATE: 16 BRPM | TEMPERATURE: 97 F

## 2021-11-03 LAB — SURGICAL PATHOLOGY STUDY: SIGNIFICANT CHANGE UP

## 2021-11-03 PROCEDURE — 99214 OFFICE O/P EST MOD 30 MIN: CPT

## 2021-11-03 PROCEDURE — 93000 ELECTROCARDIOGRAM COMPLETE: CPT

## 2021-11-04 ENCOUNTER — NON-APPOINTMENT (OUTPATIENT)
Age: 71
End: 2021-11-04

## 2021-11-04 NOTE — REVIEW OF SYSTEMS
[Feeling Fatigued] : feeling fatigued [Cough] : cough [Negative] : Heme/Lymph [Wheezing] : no wheezing [Coughing Up Blood] : no hemoptysis [Snoring] : no snoring

## 2021-11-04 NOTE — REASON FOR VISIT
[FreeTextEntry1] : Interval complaints since her last visit September 2021:\par Complains of worsening generalized fatigue and chronic cough.\par Was still +coughing/upper respiratory symptoms, chronic SOB -- she has seen Dr. Allison -- PFT mildly improved per She is using Flonase .?sinusitis --> because of +sputum culture --> saw ID Dr. Stout and started on Levaquin. As per patient, pulmonologist thinks that her respiratory status has been optimized and not contributing to coughing.  She was advised to f/u with a cardiologist.\par \par HTN medication -- only on Cardizem -- moderately elevated in the office today 151/74.  \par reassured patient that her coughing likely related to her history of tracheomalacia and not to BP medication.\par She continues to take Eliquis, mexiletine for atrial fibrillation -- no complaints of bleeding.\par \par My review of her last echocardiogram from Jan 2021 - normal stress echocardiogram\par \par Doubt coughing is related to cardiac conditions.\par Will arrange echocardiogram for follow-up evaluation of LVEF, valvular regurgitation.\par F/U after echocardiogram to review results and updated recommendations.\par \par \par She is a 72 y/o woman with tracheobronchomalacia s/p tracheobronchoplasty, lung nodules, severe allergic asthma, adrenal insufficiency, bronchiectasis, DM II, HTN, CRC s/p colostomy, mod-severe AI, paroxysmal atrial fibrillation on Eliquis

## 2021-11-11 ENCOUNTER — OUTPATIENT (OUTPATIENT)
Dept: OUTPATIENT SERVICES | Facility: HOSPITAL | Age: 71
LOS: 1 days | Discharge: HOME | End: 2021-11-11

## 2021-11-11 DIAGNOSIS — C18.9 MALIGNANT NEOPLASM OF COLON, UNSPECIFIED: ICD-10-CM

## 2021-11-11 DIAGNOSIS — Z87.09 PERSONAL HISTORY OF OTHER DISEASES OF THE RESPIRATORY SYSTEM: Chronic | ICD-10-CM

## 2021-11-11 DIAGNOSIS — Z98.890 OTHER SPECIFIED POSTPROCEDURAL STATES: Chronic | ICD-10-CM

## 2021-11-11 DIAGNOSIS — Z98.89 OTHER SPECIFIED POSTPROCEDURAL STATES: Chronic | ICD-10-CM

## 2021-11-11 DIAGNOSIS — H05.352 EXOSTOSIS OF LEFT ORBIT: Chronic | ICD-10-CM

## 2021-11-11 DIAGNOSIS — K62.5 HEMORRHAGE OF ANUS AND RECTUM: Chronic | ICD-10-CM

## 2021-11-11 DIAGNOSIS — Z96.653 PRESENCE OF ARTIFICIAL KNEE JOINT, BILATERAL: Chronic | ICD-10-CM

## 2021-11-15 ENCOUNTER — LABORATORY RESULT (OUTPATIENT)
Age: 71
End: 2021-11-15

## 2021-11-16 ENCOUNTER — RESULT REVIEW (OUTPATIENT)
Age: 71
End: 2021-11-16

## 2021-11-16 ENCOUNTER — APPOINTMENT (OUTPATIENT)
Dept: INFUSION THERAPY | Facility: HOSPITAL | Age: 71
End: 2021-11-16

## 2021-11-16 ENCOUNTER — LABORATORY RESULT (OUTPATIENT)
Age: 71
End: 2021-11-16

## 2021-11-16 LAB
BASOPHILS # BLD AUTO: 0.01 K/UL — SIGNIFICANT CHANGE UP (ref 0–0.2)
BASOPHILS NFR BLD AUTO: 0.1 % — SIGNIFICANT CHANGE UP (ref 0–2)
EOSINOPHIL # BLD AUTO: 0.02 K/UL — SIGNIFICANT CHANGE UP (ref 0–0.5)
EOSINOPHIL NFR BLD AUTO: 0.2 % — SIGNIFICANT CHANGE UP (ref 0–6)
HCT VFR BLD CALC: 44.1 % — SIGNIFICANT CHANGE UP (ref 34.5–45)
HGB BLD-MCNC: 13.4 G/DL — SIGNIFICANT CHANGE UP (ref 11.5–15.5)
IMM GRANULOCYTES NFR BLD AUTO: 0.4 % — SIGNIFICANT CHANGE UP (ref 0–1.5)
LYMPHOCYTES # BLD AUTO: 0.93 K/UL — LOW (ref 1–3.3)
LYMPHOCYTES # BLD AUTO: 11.1 % — LOW (ref 13–44)
MCHC RBC-ENTMCNC: 24.3 PG — LOW (ref 27–34)
MCHC RBC-ENTMCNC: 30.4 G/DL — LOW (ref 32–36)
MCV RBC AUTO: 80 FL — SIGNIFICANT CHANGE UP (ref 80–100)
MONOCYTES # BLD AUTO: 0.39 K/UL — SIGNIFICANT CHANGE UP (ref 0–0.9)
MONOCYTES NFR BLD AUTO: 4.7 % — SIGNIFICANT CHANGE UP (ref 2–14)
NEUTROPHILS # BLD AUTO: 7 K/UL — SIGNIFICANT CHANGE UP (ref 1.8–7.4)
NEUTROPHILS NFR BLD AUTO: 83.5 % — HIGH (ref 43–77)
NRBC # BLD: 0 /100 WBCS — SIGNIFICANT CHANGE UP (ref 0–0)
PLATELET # BLD AUTO: 258 K/UL — SIGNIFICANT CHANGE UP (ref 150–400)
RBC # BLD: 5.51 M/UL — HIGH (ref 3.8–5.2)
RBC # FLD: 16.9 % — HIGH (ref 10.3–14.5)
WBC # BLD: 8.38 K/UL — SIGNIFICANT CHANGE UP (ref 3.8–10.5)
WBC # FLD AUTO: 8.38 K/UL — SIGNIFICANT CHANGE UP (ref 3.8–10.5)

## 2021-11-17 ENCOUNTER — APPOINTMENT (OUTPATIENT)
Dept: UROLOGY | Facility: CLINIC | Age: 71
End: 2021-11-17
Payer: MEDICARE

## 2021-11-17 VITALS
DIASTOLIC BLOOD PRESSURE: 68 MMHG | TEMPERATURE: 98.2 F | RESPIRATION RATE: 16 BRPM | SYSTOLIC BLOOD PRESSURE: 152 MMHG | HEART RATE: 68 BPM

## 2021-11-17 DIAGNOSIS — R31.29 OTHER MICROSCOPIC HEMATURIA: ICD-10-CM

## 2021-11-17 DIAGNOSIS — R31.0 GROSS HEMATURIA: ICD-10-CM

## 2021-11-17 PROCEDURE — 99215 OFFICE O/P EST HI 40 MIN: CPT

## 2021-11-17 PROCEDURE — 81003 URINALYSIS AUTO W/O SCOPE: CPT | Mod: QW

## 2021-11-19 ENCOUNTER — NON-APPOINTMENT (OUTPATIENT)
Age: 71
End: 2021-11-19

## 2021-11-19 LAB
BILIRUB UR QL STRIP: NEGATIVE
CLARITY UR: CLEAR
COLLECTION METHOD: NORMAL
GLUCOSE UR-MCNC: NEGATIVE
HCG UR QL: 0.2 EU/DL
HGB UR QL STRIP.AUTO: NEGATIVE
KETONES UR-MCNC: NEGATIVE
LEUKOCYTE ESTERASE UR QL STRIP: NEGATIVE
NITRITE UR QL STRIP: NEGATIVE
PH UR STRIP: 5.5
PROT UR STRIP-MCNC: NEGATIVE
SP GR UR STRIP: 1.03

## 2021-11-19 NOTE — DISCHARGE NOTE PROVIDER - CARE PROVIDER_API CALL
Steven Leahy  INTERVENTIONAL CARDIOLOGY  [address] [place] [id number]  Phone: [phone]  Fax: [phone]  Established Patient  Follow Up Time:     Dex John)  Cardiology; Interventional Cardiology  [address] [place] [address]  Phone: [phone]  Fax: [phone]  Established Patient  Follow Up Time:    6.5 Steven Leahy  INTERVENTIONAL CARDIOLOGY  [address] [place] [address]  Phone: [phone]  Fax: [phone]  Established Patient  Follow Up Time:     Dex John)  Cardiology; Interventional Cardiology  [address], [address] [place] [address]  Phone: [phone]  Fax: [phone]  Established Patient  Follow Up Time:     Roman Zimmerman)  Cardiac Electrophysiology  [address] [place] [address]  Phone: [phone]  Fax: [phone]  Scheduled Appointment: 02/03/2021 09:45 AM   Steven Leahy  INTERVENTIONAL CARDIOLOGY  [address] [place] [address]  Phone: [phone]  Fax: [phone]  Established Patient  Follow Up Time: 2 weeks    Dex John)  Cardiology; Interventional Cardiology  [address] [place] [address]  Phone: [phone]  Fax: [phone]  Established Patient  Scheduled Appointment: 01/12/2021    Roman Zimmerman)  Cardiac Electrophysiology  [address], [address] [place] [address]  Phone: [phone]  Fax: [phone]  Scheduled Appointment: 02/03/2021 09:45 AM

## 2021-11-22 ENCOUNTER — OUTPATIENT (OUTPATIENT)
Dept: OUTPATIENT SERVICES | Facility: HOSPITAL | Age: 71
LOS: 1 days | End: 2021-11-22

## 2021-11-22 ENCOUNTER — APPOINTMENT (OUTPATIENT)
Dept: CV DIAGNOSITCS | Facility: HOSPITAL | Age: 71
End: 2021-11-22
Payer: MEDICARE

## 2021-11-22 DIAGNOSIS — Z96.653 PRESENCE OF ARTIFICIAL KNEE JOINT, BILATERAL: Chronic | ICD-10-CM

## 2021-11-22 DIAGNOSIS — Z87.09 PERSONAL HISTORY OF OTHER DISEASES OF THE RESPIRATORY SYSTEM: Chronic | ICD-10-CM

## 2021-11-22 DIAGNOSIS — I48.91 UNSPECIFIED ATRIAL FIBRILLATION: ICD-10-CM

## 2021-11-22 DIAGNOSIS — Z98.89 OTHER SPECIFIED POSTPROCEDURAL STATES: Chronic | ICD-10-CM

## 2021-11-22 DIAGNOSIS — R09.3 ABNORMAL SPUTUM: ICD-10-CM

## 2021-11-22 DIAGNOSIS — Z98.890 OTHER SPECIFIED POSTPROCEDURAL STATES: Chronic | ICD-10-CM

## 2021-11-22 DIAGNOSIS — H05.352 EXOSTOSIS OF LEFT ORBIT: Chronic | ICD-10-CM

## 2021-11-22 DIAGNOSIS — K62.5 HEMORRHAGE OF ANUS AND RECTUM: Chronic | ICD-10-CM

## 2021-11-22 PROCEDURE — 93306 TTE W/DOPPLER COMPLETE: CPT | Mod: 26

## 2021-11-22 NOTE — HISTORY OF PRESENT ILLNESS
[FreeTextEntry1] : Reason for Visit: Consultation for Cystoscopy, Gross Hematuria\par \par This is a 71 year-old woman. She presents with gross hematuria. She is currently on Eliquis. History of diabetes mellitus type 2, hysterectomy, colostomy, pelvic repair, rectal tumor removal, tracheoplasty. Family history of colon cancer and ovarian cancer. Patient is referred for evaluation of her condition. Patient denies dysuria or urinary incontinence. The patient denies any aggravating or relieving factors. The patient denies any interference of function. Histories were inquired. Patient denies tobacco use. Medications and allergies were reviewed. \par \par Last CT Abdomen/Pelvis was 7/26/2021\par \par Assessment:\par \par Plan: Cystoscopy and  CT abdomen and pelvis \par \par I counseled the patient. I discussed the various etiologies of her symptoms. Risks and alternatives were discussed. I answered the patient's questions. I described the tests and procedures listed below. The patient will follow up as directed and will contact me with any questions or concerns. Thank you for the opportunity to participate in the care of this patient. I'll keep you updated on her progress.

## 2021-11-23 ENCOUNTER — APPOINTMENT (OUTPATIENT)
Dept: PULMONOLOGY | Facility: CLINIC | Age: 71
End: 2021-11-23

## 2021-11-28 ENCOUNTER — NON-APPOINTMENT (OUTPATIENT)
Age: 71
End: 2021-11-28

## 2021-11-28 ENCOUNTER — INPATIENT (INPATIENT)
Facility: HOSPITAL | Age: 71
LOS: 7 days | Discharge: HOME | DRG: 178 | End: 2021-12-06
Attending: INTERNAL MEDICINE | Admitting: HOSPITALIST
Payer: MEDICARE

## 2021-11-28 VITALS
HEART RATE: 80 BPM | HEIGHT: 66 IN | WEIGHT: 139.99 LBS | OXYGEN SATURATION: 100 % | SYSTOLIC BLOOD PRESSURE: 173 MMHG | TEMPERATURE: 98 F | RESPIRATION RATE: 22 BRPM | DIASTOLIC BLOOD PRESSURE: 78 MMHG

## 2021-11-28 DIAGNOSIS — Z98.890 OTHER SPECIFIED POSTPROCEDURAL STATES: Chronic | ICD-10-CM

## 2021-11-28 DIAGNOSIS — Z98.89 OTHER SPECIFIED POSTPROCEDURAL STATES: Chronic | ICD-10-CM

## 2021-11-28 DIAGNOSIS — J47.9 BRONCHIECTASIS, UNCOMPLICATED: ICD-10-CM

## 2021-11-28 DIAGNOSIS — K62.5 HEMORRHAGE OF ANUS AND RECTUM: Chronic | ICD-10-CM

## 2021-11-28 DIAGNOSIS — Z96.653 PRESENCE OF ARTIFICIAL KNEE JOINT, BILATERAL: Chronic | ICD-10-CM

## 2021-11-28 DIAGNOSIS — H05.352 EXOSTOSIS OF LEFT ORBIT: Chronic | ICD-10-CM

## 2021-11-28 DIAGNOSIS — Z87.09 PERSONAL HISTORY OF OTHER DISEASES OF THE RESPIRATORY SYSTEM: Chronic | ICD-10-CM

## 2021-11-28 LAB
ALBUMIN SERPL ELPH-MCNC: 4.1 G/DL — SIGNIFICANT CHANGE UP (ref 3.3–5)
ALP SERPL-CCNC: 109 U/L — SIGNIFICANT CHANGE UP (ref 40–120)
ALT FLD-CCNC: 15 U/L — SIGNIFICANT CHANGE UP (ref 10–45)
ANION GAP SERPL CALC-SCNC: 12 MMOL/L — SIGNIFICANT CHANGE UP (ref 5–17)
AST SERPL-CCNC: 15 U/L — SIGNIFICANT CHANGE UP (ref 10–40)
BASE EXCESS BLDV CALC-SCNC: 2.7 MMOL/L — HIGH (ref -2–2)
BASOPHILS # BLD AUTO: 0.03 K/UL — SIGNIFICANT CHANGE UP (ref 0–0.2)
BASOPHILS NFR BLD AUTO: 0.2 % — SIGNIFICANT CHANGE UP (ref 0–2)
BILIRUB SERPL-MCNC: 0.3 MG/DL — SIGNIFICANT CHANGE UP (ref 0.2–1.2)
BUN SERPL-MCNC: 11 MG/DL — SIGNIFICANT CHANGE UP (ref 7–23)
CA-I SERPL-SCNC: 1.18 MMOL/L — SIGNIFICANT CHANGE UP (ref 1.15–1.33)
CALCIUM SERPL-MCNC: 8.7 MG/DL — SIGNIFICANT CHANGE UP (ref 8.4–10.5)
CHLORIDE BLDV-SCNC: 103 MMOL/L — SIGNIFICANT CHANGE UP (ref 96–108)
CHLORIDE SERPL-SCNC: 104 MMOL/L — SIGNIFICANT CHANGE UP (ref 96–108)
CO2 BLDV-SCNC: 31 MMOL/L — HIGH (ref 22–26)
CO2 SERPL-SCNC: 25 MMOL/L — SIGNIFICANT CHANGE UP (ref 22–31)
CREAT SERPL-MCNC: 0.73 MG/DL — SIGNIFICANT CHANGE UP (ref 0.5–1.3)
EOSINOPHIL # BLD AUTO: 0.06 K/UL — SIGNIFICANT CHANGE UP (ref 0–0.5)
EOSINOPHIL NFR BLD AUTO: 0.5 % — SIGNIFICANT CHANGE UP (ref 0–6)
GAS PNL BLDV: 140 MMOL/L — SIGNIFICANT CHANGE UP (ref 136–145)
GAS PNL BLDV: SIGNIFICANT CHANGE UP
GAS PNL BLDV: SIGNIFICANT CHANGE UP
GLUCOSE BLDV-MCNC: 172 MG/DL — HIGH (ref 70–99)
GLUCOSE SERPL-MCNC: 180 MG/DL — HIGH (ref 70–99)
HCO3 BLDV-SCNC: 30 MMOL/L — HIGH (ref 22–29)
HCT VFR BLD CALC: 42.1 % — SIGNIFICANT CHANGE UP (ref 34.5–45)
HCT VFR BLDA CALC: 39 % — SIGNIFICANT CHANGE UP (ref 34.5–46.5)
HGB BLD CALC-MCNC: 13.1 G/DL — SIGNIFICANT CHANGE UP (ref 11.7–16.1)
HGB BLD-MCNC: 12.9 G/DL — SIGNIFICANT CHANGE UP (ref 11.5–15.5)
IMM GRANULOCYTES NFR BLD AUTO: 0.6 % — SIGNIFICANT CHANGE UP (ref 0–1.5)
LACTATE BLDV-MCNC: 2 MMOL/L — SIGNIFICANT CHANGE UP (ref 0.7–2)
LYMPHOCYTES # BLD AUTO: 1.91 K/UL — SIGNIFICANT CHANGE UP (ref 1–3.3)
LYMPHOCYTES # BLD AUTO: 15.6 % — SIGNIFICANT CHANGE UP (ref 13–44)
MCHC RBC-ENTMCNC: 24 PG — LOW (ref 27–34)
MCHC RBC-ENTMCNC: 30.6 GM/DL — LOW (ref 32–36)
MCV RBC AUTO: 78.3 FL — LOW (ref 80–100)
MONOCYTES # BLD AUTO: 1.1 K/UL — HIGH (ref 0–0.9)
MONOCYTES NFR BLD AUTO: 9 % — SIGNIFICANT CHANGE UP (ref 2–14)
NEUTROPHILS # BLD AUTO: 9.07 K/UL — HIGH (ref 1.8–7.4)
NEUTROPHILS NFR BLD AUTO: 74.1 % — SIGNIFICANT CHANGE UP (ref 43–77)
NRBC # BLD: 0 /100 WBCS — SIGNIFICANT CHANGE UP (ref 0–0)
NT-PROBNP SERPL-SCNC: 322 PG/ML — HIGH (ref 0–300)
PCO2 BLDV: 55 MMHG — HIGH (ref 39–42)
PH BLDV: 7.34 — SIGNIFICANT CHANGE UP (ref 7.32–7.43)
PLATELET # BLD AUTO: 257 K/UL — SIGNIFICANT CHANGE UP (ref 150–400)
PO2 BLDV: 42 MMHG — SIGNIFICANT CHANGE UP (ref 25–45)
POTASSIUM BLDV-SCNC: 3.6 MMOL/L — SIGNIFICANT CHANGE UP (ref 3.5–5.1)
POTASSIUM SERPL-MCNC: 3.6 MMOL/L — SIGNIFICANT CHANGE UP (ref 3.5–5.3)
POTASSIUM SERPL-SCNC: 3.6 MMOL/L — SIGNIFICANT CHANGE UP (ref 3.5–5.3)
PROT SERPL-MCNC: 6.7 G/DL — SIGNIFICANT CHANGE UP (ref 6–8.3)
RAPID RVP RESULT: SIGNIFICANT CHANGE UP
RBC # BLD: 5.38 M/UL — HIGH (ref 3.8–5.2)
RBC # FLD: 16.5 % — HIGH (ref 10.3–14.5)
SAO2 % BLDV: 70.5 % — SIGNIFICANT CHANGE UP (ref 67–88)
SARS-COV-2 RNA SPEC QL NAA+PROBE: SIGNIFICANT CHANGE UP
SODIUM SERPL-SCNC: 141 MMOL/L — SIGNIFICANT CHANGE UP (ref 135–145)
TROPONIN T, HIGH SENSITIVITY RESULT: 18 NG/L — SIGNIFICANT CHANGE UP (ref 0–51)
TROPONIN T, HIGH SENSITIVITY RESULT: 20 NG/L — SIGNIFICANT CHANGE UP (ref 0–51)
WBC # BLD: 12.24 K/UL — HIGH (ref 3.8–10.5)
WBC # FLD AUTO: 12.24 K/UL — HIGH (ref 3.8–10.5)

## 2021-11-28 PROCEDURE — 93010 ELECTROCARDIOGRAM REPORT: CPT | Mod: GC

## 2021-11-28 PROCEDURE — 71045 X-RAY EXAM CHEST 1 VIEW: CPT | Mod: 26

## 2021-11-28 PROCEDURE — 99285 EMERGENCY DEPT VISIT HI MDM: CPT | Mod: CS,GC

## 2021-11-28 RX ORDER — MEROPENEM 1 G/30ML
1000 INJECTION INTRAVENOUS ONCE
Refills: 0 | Status: COMPLETED | OUTPATIENT
Start: 2021-11-28 | End: 2021-11-28

## 2021-11-28 RX ORDER — ACETAMINOPHEN 500 MG
650 TABLET ORAL ONCE
Refills: 0 | Status: COMPLETED | OUTPATIENT
Start: 2021-11-28 | End: 2021-11-28

## 2021-11-28 RX ORDER — ONDANSETRON 8 MG/1
4 TABLET, FILM COATED ORAL ONCE
Refills: 0 | Status: COMPLETED | OUTPATIENT
Start: 2021-11-28 | End: 2021-11-28

## 2021-11-28 RX ORDER — DILTIAZEM HCL 120 MG
30 CAPSULE, EXT RELEASE 24 HR ORAL ONCE
Refills: 0 | Status: COMPLETED | OUTPATIENT
Start: 2021-11-28 | End: 2021-11-28

## 2021-11-28 RX ORDER — APIXABAN 2.5 MG/1
5 TABLET, FILM COATED ORAL ONCE
Refills: 0 | Status: COMPLETED | OUTPATIENT
Start: 2021-11-28 | End: 2021-11-28

## 2021-11-28 RX ORDER — METOCLOPRAMIDE HCL 10 MG
10 TABLET ORAL ONCE
Refills: 0 | Status: COMPLETED | OUTPATIENT
Start: 2021-11-28 | End: 2021-11-28

## 2021-11-28 RX ADMIN — MEROPENEM 1000 MILLIGRAM(S): 1 INJECTION INTRAVENOUS at 17:43

## 2021-11-28 RX ADMIN — APIXABAN 5 MILLIGRAM(S): 2.5 TABLET, FILM COATED ORAL at 17:08

## 2021-11-28 RX ADMIN — Medication 30 MILLIGRAM(S): at 17:08

## 2021-11-28 RX ADMIN — ONDANSETRON 4 MILLIGRAM(S): 8 TABLET, FILM COATED ORAL at 17:34

## 2021-11-28 RX ADMIN — Medication 10 MILLIGRAM(S): at 18:31

## 2021-11-28 RX ADMIN — MEROPENEM 100 MILLIGRAM(S): 1 INJECTION INTRAVENOUS at 17:07

## 2021-11-28 RX ADMIN — Medication 650 MILLIGRAM(S): at 17:34

## 2021-11-28 NOTE — ED PROVIDER NOTE - PHYSICAL EXAMINATION
Attn - alert, mild resp distress, skin warm and dry,  moist mm, Lungs - course BS bilat, Cor - tachy (CM = sinus tach), abdo -soft, NT, ND, no CVAT, Exterm - no edema or c/t, Neuro - grossly intact and nonfocal.

## 2021-11-28 NOTE — ED ADULT NURSE REASSESSMENT NOTE - NS ED NURSE REASSESS COMMENT FT1
resting in bed; alert and oriented; turned and repositioned; skin intact; o2 2 liters via nc in place; no acute distress; ostomy appliance abdomen intact with small amt soft brown stool in bad; [id number] is accessed and intact;  pt declines offer of food; updated re: plan of care; await bed assignment.

## 2021-11-28 NOTE — ED ADULT NURSE NOTE - NSIMPLEMENTINTERV_GEN_ALL_ED
Implemented All Fall Risk Interventions:  [place] to call system. Call bell, personal items and telephone within reach. Instruct patient to call for assistance. Room bathroom lighting operational. Non-slip footwear when patient is off stretcher. Physically safe environment: no spills, clutter or unnecessary equipment. Stretcher in lowest position, wheels locked, appropriate side rails in place. Provide visual cue, wrist band, yellow gown, etc. Monitor gait and stability. Monitor for mental status changes and reorient to person, place, and time. Review medications for side effects contributing to fall risk. Reinforce activity limits and safety measures with patient and family.

## 2021-11-28 NOTE — ED PROVIDER NOTE - CLINICAL SUMMARY MEDICAL DECISION MAKING FREE TEXT BOX
Attn - pt with hx of Asthma/COPD/Tracheal malacia/bronchiectasis with increased cough and productive of sputum and sob/khan.  chronic steroids.  ABx, steroids, CXR, labs, admit.  prior sputum=pseudomonas. Pulm consult with her Pulm - Dr. Eulalio Allison [phone]

## 2021-11-28 NOTE — ED ADULT NURSE NOTE - NSICDXPASTSURGICALHX_GEN_ALL_CORE_FT
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    H/O pelvic surgery 5 years ago - s/p fracture    H/O total knee replacement, bilateral 5 years ago    History of partial hysterectomy 30 years ago - fibroids    History of sinus surgery multiple sinus surgeries    History of tracheomalacia 2015 - attempted tracheal stenting ([facility])- course complicated by obstruction, respiratory failure, multiple CPR attempts -  stent discontinued; 10/20/2016 Tracheobronchoplasty (Prolene Mesh) performed at [facility] by Dr Zapien    Rectal bleeding exam under anesthesia ([facility]) 2/2018    S/P bronchoscopy 6/5/2018 - [facility] (Dr Zapien) no evidence of tracheobronchomalacia in trachea or bronchial tubes

## 2021-11-28 NOTE — ED PROVIDER NOTE - OBJECTIVE STATEMENT
Attn - pt seen in CritB - BIB EMS from [facility] - c/o increased SOB/KRAMER and cough with yellow sputum x 1 day.  no fever, chills, sweats, rigors.  c/o heaviness in chest since yesterday.  Nausea en route by EMS and rec'd Zofran.  pt has hx of asthma/COPD and tracheal malacia.  Chronic steroids.  PMHx also AFib, PVCs, DM, Multiple Myeloma.  Reports SAFIA last week without change.

## 2021-11-28 NOTE — ED ADULT NURSE NOTE - OBJECTIVE STATEMENT
70 yo F c/o of SOB, chest pressure and cough x 1 day PMH Asthma, Afib (Eliquis). Pt is aaox4 well appearing, and speaking in full sentences without difficulty. +nausea no vomiting. Breathing spontaneous and unlabored with pulse ox >95% on 2-3L. EKG completed. Upon assessment, abdomen soft and nontender, +strong peripheral pulses, moving all extremities without difficulty, lungs clear, No pitting edema to b/l LE- Skin warm, dry and pink. Pt placed in position of comfort. Pt educated on call bell system and provided call bell. Bed in lowest position, wheels locked, appropriate side rails raised. Cardiac monitor applied. IV line placed x1 attempt. Pt tolerated well, labs drawn and sent. Provider at bedside evaluating.

## 2021-11-28 NOTE — ED PROVIDER NOTE - NSICDXPASTSURGICALHX_GEN_ALL_CORE_FT
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    H/O pelvic surgery 5 years ago - s/p fracture    H/O total knee replacement, bilateral 5 years ago    History of partial hysterectomy 30 years ago - fibroids    History of sinus surgery multiple sinus surgeries    History of tracheomalacia 2015 - attempted tracheal stenting ([facility])- course complicated by obstruction, respiratory failure, multiple CPR attempts -  stent discontinued; 10/20/2016 Tracheobronchoplasty (Prolene Mesh) performed at [facility] by Dr Zapien    Rectal bleeding exam under anesthesia ([facility]) 2/2018    S/P bronchoscopy 6/5/2018 - [facility] (Dr Zapien) no evidence of tracheobronchomalacia in trachea or bronchial tubes

## 2021-11-29 DIAGNOSIS — J45.909 UNSPECIFIED ASTHMA, UNCOMPLICATED: ICD-10-CM

## 2021-11-29 DIAGNOSIS — I49.3 VENTRICULAR PREMATURE DEPOLARIZATION: ICD-10-CM

## 2021-11-29 DIAGNOSIS — E11.9 TYPE 2 DIABETES MELLITUS WITHOUT COMPLICATIONS: ICD-10-CM

## 2021-11-29 DIAGNOSIS — E27.40 UNSPECIFIED ADRENOCORTICAL INSUFFICIENCY: ICD-10-CM

## 2021-11-29 DIAGNOSIS — J39.8 OTHER SPECIFIED DISEASES OF UPPER RESPIRATORY TRACT: ICD-10-CM

## 2021-11-29 DIAGNOSIS — Z29.9 ENCOUNTER FOR PROPHYLACTIC MEASURES, UNSPECIFIED: ICD-10-CM

## 2021-11-29 DIAGNOSIS — R06.02 SHORTNESS OF BREATH: ICD-10-CM

## 2021-11-29 LAB
ALBUMIN SERPL ELPH-MCNC: 3.5 G/DL — SIGNIFICANT CHANGE UP (ref 3.3–5)
ALP SERPL-CCNC: 98 U/L — SIGNIFICANT CHANGE UP (ref 40–120)
ALT FLD-CCNC: 12 U/L — SIGNIFICANT CHANGE UP (ref 10–45)
ANION GAP SERPL CALC-SCNC: 12 MMOL/L — SIGNIFICANT CHANGE UP (ref 5–17)
AST SERPL-CCNC: 13 U/L — SIGNIFICANT CHANGE UP (ref 10–40)
BILIRUB SERPL-MCNC: 0.3 MG/DL — SIGNIFICANT CHANGE UP (ref 0.2–1.2)
BUN SERPL-MCNC: 11 MG/DL — SIGNIFICANT CHANGE UP (ref 7–23)
CALCIUM SERPL-MCNC: 8.6 MG/DL — SIGNIFICANT CHANGE UP (ref 8.4–10.5)
CHLORIDE SERPL-SCNC: 103 MMOL/L — SIGNIFICANT CHANGE UP (ref 96–108)
CO2 SERPL-SCNC: 24 MMOL/L — SIGNIFICANT CHANGE UP (ref 22–31)
COVID-19 NUCLEOCAPSID GAM AB INTERP: NEGATIVE — SIGNIFICANT CHANGE UP
COVID-19 NUCLEOCAPSID TOTAL GAM ANTIBODY RESULT: 0.08 INDEX — SIGNIFICANT CHANGE UP
COVID-19 SPIKE DOMAIN AB INTERP: POSITIVE
COVID-19 SPIKE DOMAIN ANTIBODY RESULT: 154 U/ML — HIGH
CREAT SERPL-MCNC: 0.81 MG/DL — SIGNIFICANT CHANGE UP (ref 0.5–1.3)
GLUCOSE SERPL-MCNC: 173 MG/DL — HIGH (ref 70–99)
HCT VFR BLD CALC: 40.9 % — SIGNIFICANT CHANGE UP (ref 34.5–45)
HGB BLD-MCNC: 12.4 G/DL — SIGNIFICANT CHANGE UP (ref 11.5–15.5)
MCHC RBC-ENTMCNC: 23.9 PG — LOW (ref 27–34)
MCHC RBC-ENTMCNC: 30.3 GM/DL — LOW (ref 32–36)
MCV RBC AUTO: 78.8 FL — LOW (ref 80–100)
NRBC # BLD: 0 /100 WBCS — SIGNIFICANT CHANGE UP (ref 0–0)
PLATELET # BLD AUTO: 229 K/UL — SIGNIFICANT CHANGE UP (ref 150–400)
POTASSIUM SERPL-MCNC: 4.1 MMOL/L — SIGNIFICANT CHANGE UP (ref 3.5–5.3)
POTASSIUM SERPL-SCNC: 4.1 MMOL/L — SIGNIFICANT CHANGE UP (ref 3.5–5.3)
PROT SERPL-MCNC: 6.1 G/DL — SIGNIFICANT CHANGE UP (ref 6–8.3)
RBC # BLD: 5.19 M/UL — SIGNIFICANT CHANGE UP (ref 3.8–5.2)
RBC # FLD: 16.5 % — HIGH (ref 10.3–14.5)
SARS-COV-2 IGG+IGM SERPL QL IA: 0.08 INDEX — SIGNIFICANT CHANGE UP
SARS-COV-2 IGG+IGM SERPL QL IA: 154 U/ML — HIGH
SARS-COV-2 IGG+IGM SERPL QL IA: NEGATIVE — SIGNIFICANT CHANGE UP
SARS-COV-2 IGG+IGM SERPL QL IA: POSITIVE
SODIUM SERPL-SCNC: 139 MMOL/L — SIGNIFICANT CHANGE UP (ref 135–145)
WBC # BLD: 18.05 K/UL — HIGH (ref 3.8–10.5)
WBC # FLD AUTO: 18.05 K/UL — HIGH (ref 3.8–10.5)

## 2021-11-29 PROCEDURE — 71250 CT THORAX DX C-: CPT | Mod: 26

## 2021-11-29 PROCEDURE — 99222 1ST HOSP IP/OBS MODERATE 55: CPT

## 2021-11-29 PROCEDURE — 12345: CPT | Mod: NC,GC

## 2021-11-29 RX ORDER — ALBUTEROL 90 UG/1
2 AEROSOL, METERED ORAL EVERY 6 HOURS
Refills: 0 | Status: DISCONTINUED | OUTPATIENT
Start: 2021-11-29 | End: 2021-12-06

## 2021-11-29 RX ORDER — INSULIN LISPRO 100/ML
VIAL (ML) SUBCUTANEOUS AT BEDTIME
Refills: 0 | Status: DISCONTINUED | OUTPATIENT
Start: 2021-11-29 | End: 2021-11-30

## 2021-11-29 RX ORDER — SODIUM CHLORIDE 9 MG/ML
1000 INJECTION, SOLUTION INTRAVENOUS
Refills: 0 | Status: DISCONTINUED | OUTPATIENT
Start: 2021-11-29 | End: 2021-12-06

## 2021-11-29 RX ORDER — DEXTROSE 50 % IN WATER 50 %
25 SYRINGE (ML) INTRAVENOUS ONCE
Refills: 0 | Status: DISCONTINUED | OUTPATIENT
Start: 2021-11-29 | End: 2021-12-06

## 2021-11-29 RX ORDER — DILTIAZEM HCL 120 MG
60 CAPSULE, EXT RELEASE 24 HR ORAL EVERY 12 HOURS
Refills: 0 | Status: DISCONTINUED | OUTPATIENT
Start: 2021-11-29 | End: 2021-12-06

## 2021-11-29 RX ORDER — CEFEPIME 1 G/1
1000 INJECTION, POWDER, FOR SOLUTION INTRAMUSCULAR; INTRAVENOUS ONCE
Refills: 0 | Status: COMPLETED | OUTPATIENT
Start: 2021-11-29 | End: 2021-11-29

## 2021-11-29 RX ORDER — BUDESONIDE AND FORMOTEROL FUMARATE DIHYDRATE 160; 4.5 UG/1; UG/1
2 AEROSOL RESPIRATORY (INHALATION)
Refills: 0 | Status: DISCONTINUED | OUTPATIENT
Start: 2021-11-29 | End: 2021-12-06

## 2021-11-29 RX ORDER — CEFEPIME 1 G/1
1000 INJECTION, POWDER, FOR SOLUTION INTRAMUSCULAR; INTRAVENOUS EVERY 8 HOURS
Refills: 0 | Status: DISCONTINUED | OUTPATIENT
Start: 2021-11-29 | End: 2021-12-06

## 2021-11-29 RX ORDER — METOCLOPRAMIDE HCL 10 MG
10 TABLET ORAL EVERY 8 HOURS
Refills: 0 | Status: DISCONTINUED | OUTPATIENT
Start: 2021-11-29 | End: 2021-12-06

## 2021-11-29 RX ORDER — GLUCAGON INJECTION, SOLUTION 0.5 MG/.1ML
1 INJECTION, SOLUTION SUBCUTANEOUS ONCE
Refills: 0 | Status: DISCONTINUED | OUTPATIENT
Start: 2021-11-29 | End: 2021-12-06

## 2021-11-29 RX ORDER — POTASSIUM CHLORIDE 20 MEQ
10 PACKET (EA) ORAL
Refills: 0 | Status: DISCONTINUED | OUTPATIENT
Start: 2021-11-29 | End: 2021-11-29

## 2021-11-29 RX ORDER — INSULIN LISPRO 100/ML
VIAL (ML) SUBCUTANEOUS
Refills: 0 | Status: DISCONTINUED | OUTPATIENT
Start: 2021-11-29 | End: 2021-11-30

## 2021-11-29 RX ORDER — MEXILETINE HYDROCHLORIDE 150 MG/1
200 CAPSULE ORAL THREE TIMES A DAY
Refills: 0 | Status: DISCONTINUED | OUTPATIENT
Start: 2021-11-29 | End: 2021-12-06

## 2021-11-29 RX ORDER — ACETAMINOPHEN 500 MG
650 TABLET ORAL EVERY 6 HOURS
Refills: 0 | Status: DISCONTINUED | OUTPATIENT
Start: 2021-11-29 | End: 2021-12-06

## 2021-11-29 RX ORDER — ONDANSETRON 8 MG/1
4 TABLET, FILM COATED ORAL EVERY 8 HOURS
Refills: 0 | Status: DISCONTINUED | OUTPATIENT
Start: 2021-11-29 | End: 2021-11-29

## 2021-11-29 RX ORDER — DEXTROSE 50 % IN WATER 50 %
12.5 SYRINGE (ML) INTRAVENOUS ONCE
Refills: 0 | Status: DISCONTINUED | OUTPATIENT
Start: 2021-11-29 | End: 2021-12-06

## 2021-11-29 RX ORDER — PANTOPRAZOLE SODIUM 20 MG/1
40 TABLET, DELAYED RELEASE ORAL
Refills: 0 | Status: DISCONTINUED | OUTPATIENT
Start: 2021-11-29 | End: 2021-12-06

## 2021-11-29 RX ORDER — POTASSIUM CHLORIDE 20 MEQ
40 PACKET (EA) ORAL ONCE
Refills: 0 | Status: COMPLETED | OUTPATIENT
Start: 2021-11-29 | End: 2021-11-29

## 2021-11-29 RX ORDER — INSULIN GLARGINE 100 [IU]/ML
5 INJECTION, SOLUTION SUBCUTANEOUS ONCE
Refills: 0 | Status: COMPLETED | OUTPATIENT
Start: 2021-11-29 | End: 2021-11-29

## 2021-11-29 RX ORDER — POLYETHYLENE GLYCOL 3350 17 G/17G
17 POWDER, FOR SOLUTION ORAL DAILY
Refills: 0 | Status: DISCONTINUED | OUTPATIENT
Start: 2021-11-29 | End: 2021-12-06

## 2021-11-29 RX ORDER — TIOTROPIUM BROMIDE 18 UG/1
1 CAPSULE ORAL; RESPIRATORY (INHALATION) DAILY
Refills: 0 | Status: DISCONTINUED | OUTPATIENT
Start: 2021-11-29 | End: 2021-12-06

## 2021-11-29 RX ORDER — LANOLIN ALCOHOL/MO/W.PET/CERES
3 CREAM (GRAM) TOPICAL AT BEDTIME
Refills: 0 | Status: DISCONTINUED | OUTPATIENT
Start: 2021-11-29 | End: 2021-12-06

## 2021-11-29 RX ORDER — CHLORHEXIDINE GLUCONATE 213 G/1000ML
1 SOLUTION TOPICAL
Refills: 0 | Status: DISCONTINUED | OUTPATIENT
Start: 2021-11-29 | End: 2021-12-06

## 2021-11-29 RX ORDER — CEFEPIME 1 G/1
INJECTION, POWDER, FOR SOLUTION INTRAMUSCULAR; INTRAVENOUS
Refills: 0 | Status: DISCONTINUED | OUTPATIENT
Start: 2021-11-29 | End: 2021-12-06

## 2021-11-29 RX ORDER — DEXTROSE 50 % IN WATER 50 %
15 SYRINGE (ML) INTRAVENOUS ONCE
Refills: 0 | Status: DISCONTINUED | OUTPATIENT
Start: 2021-11-29 | End: 2021-12-06

## 2021-11-29 RX ORDER — DILTIAZEM HCL 120 MG
30 CAPSULE, EXT RELEASE 24 HR ORAL ONCE
Refills: 0 | Status: DISCONTINUED | OUTPATIENT
Start: 2021-11-29 | End: 2021-11-29

## 2021-11-29 RX ORDER — MONTELUKAST 4 MG/1
10 TABLET, CHEWABLE ORAL DAILY
Refills: 0 | Status: DISCONTINUED | OUTPATIENT
Start: 2021-11-29 | End: 2021-12-06

## 2021-11-29 RX ADMIN — MEXILETINE HYDROCHLORIDE 200 MILLIGRAM(S): 150 CAPSULE ORAL at 05:48

## 2021-11-29 RX ADMIN — Medication 10 MILLIGRAM(S): at 14:09

## 2021-11-29 RX ADMIN — Medication 2: at 14:37

## 2021-11-29 RX ADMIN — BUDESONIDE AND FORMOTEROL FUMARATE DIHYDRATE 2 PUFF(S): 160; 4.5 AEROSOL RESPIRATORY (INHALATION) at 05:48

## 2021-11-29 RX ADMIN — Medication 60 MILLIGRAM(S): at 05:48

## 2021-11-29 RX ADMIN — Medication 60 MILLIGRAM(S): at 17:37

## 2021-11-29 RX ADMIN — CEFEPIME 100 MILLIGRAM(S): 1 INJECTION, POWDER, FOR SOLUTION INTRAMUSCULAR; INTRAVENOUS at 03:56

## 2021-11-29 RX ADMIN — MEXILETINE HYDROCHLORIDE 200 MILLIGRAM(S): 150 CAPSULE ORAL at 23:03

## 2021-11-29 RX ADMIN — ALBUTEROL 2 PUFF(S): 90 AEROSOL, METERED ORAL at 05:49

## 2021-11-29 RX ADMIN — POLYETHYLENE GLYCOL 3350 17 GRAM(S): 17 POWDER, FOR SOLUTION ORAL at 14:40

## 2021-11-29 RX ADMIN — Medication 40 MILLIEQUIVALENT(S): at 03:55

## 2021-11-29 RX ADMIN — ALBUTEROL 2 PUFF(S): 90 AEROSOL, METERED ORAL at 18:17

## 2021-11-29 RX ADMIN — BUDESONIDE AND FORMOTEROL FUMARATE DIHYDRATE 2 PUFF(S): 160; 4.5 AEROSOL RESPIRATORY (INHALATION) at 18:17

## 2021-11-29 RX ADMIN — PANTOPRAZOLE SODIUM 40 MILLIGRAM(S): 20 TABLET, DELAYED RELEASE ORAL at 05:48

## 2021-11-29 RX ADMIN — Medication 2: at 17:38

## 2021-11-29 RX ADMIN — CEFEPIME 100 MILLIGRAM(S): 1 INJECTION, POWDER, FOR SOLUTION INTRAMUSCULAR; INTRAVENOUS at 14:39

## 2021-11-29 RX ADMIN — CEFEPIME 100 MILLIGRAM(S): 1 INJECTION, POWDER, FOR SOLUTION INTRAMUSCULAR; INTRAVENOUS at 23:03

## 2021-11-29 RX ADMIN — Medication 600 MILLIGRAM(S): at 17:37

## 2021-11-29 RX ADMIN — MEXILETINE HYDROCHLORIDE 200 MILLIGRAM(S): 150 CAPSULE ORAL at 17:37

## 2021-11-29 RX ADMIN — Medication 4 MILLIGRAM(S): at 05:48

## 2021-11-29 RX ADMIN — TIOTROPIUM BROMIDE 1 CAPSULE(S): 18 CAPSULE ORAL; RESPIRATORY (INHALATION) at 05:48

## 2021-11-29 RX ADMIN — Medication 200 MILLIGRAM(S): at 14:41

## 2021-11-29 RX ADMIN — Medication 1: at 08:40

## 2021-11-29 RX ADMIN — Medication 650 MILLIGRAM(S): at 03:56

## 2021-11-29 RX ADMIN — MONTELUKAST 10 MILLIGRAM(S): 4 TABLET, CHEWABLE ORAL at 14:39

## 2021-11-29 NOTE — H&P ADULT - NSHPREVIEWOFSYSTEMS_GEN_ALL_CORE
REVIEW OF SYSTEMS:    CONSTITUTIONAL: No weakness, fevers or chills  EYES/ENT: No visual changes;  No vertigo or throat pain   NECK: No pain or stiffness  RESPIRATORY: No cough, wheezing, hemoptysis; +shortness of breath  CARDIOVASCULAR: No chest pain or palpitations + chest discomfort  GASTROINTESTINAL: No abdominal or epigastric pain. No nausea, vomiting, or hematemesis; No diarrhea or constipation. No melena or hematochezia.  GENITOURINARY: No dysuria, frequency or hematuria  NEUROLOGICAL: No numbness or weakness  SKIN: No itching, burning, rashes, or lesions   HEME: no easy bruising or unexplained bleeding  ENDO: no heat intolerance, no cold intolerance  PSYCH: no SI, or depression  All other review of systems is negative unless indicated above.

## 2021-11-29 NOTE — CONSULT NOTE ADULT - ASSESSMENT
70 y/o F w/tracheobronchomalacia s/p tracheobronchoplasty, severe persistent asthma, bronchiectasis, and colon cancer s/p colectomy admitted with likely exacerbation of bronchiectasis due to pneumonia.    - Supplemental O2 as needed goal O2 sat >= 90%  - Broad spectrum abx including pseudomonal coverage  - Airway clearance, acapella device, bronchodilators, chest PT  - Continue home asthma regimen  72 y/o F w/tracheobronchomalacia s/p tracheobronchoplasty, severe persistent asthma, bronchiectasis, and colon cancer s/p colectomy admitted with likely exacerbation of bronchiectasis due to pneumonia.    - Supplemental O2 as needed goal O2 sat >= 90%  - Broad spectrum abx including pseudomonal coverage  - Airway clearance, acapella device, bronchodilators, chest PT  - Continue home asthma regimen  - Repeat CT scan in 3 months for follow up of new pulmonary nodule

## 2021-11-29 NOTE — H&P ADULT - ATTENDING COMMENTS
I have personally reviewed all labs, imaging and EKG  In brief, 71F w/ PMH Tracheobronchomalasia s/p Tracheobronchoplasty, Bronchiectasis, Severe Allergic Asthma, AI, DM2, HTN, Colorectal cancer s/p total colectomy now with colostomy, PAF on Eliquis p/w 1 day of chest discomfort associated with SOB. She has also had a cough in the past day that has become more productive with sputum. She reports she had bronchoscopy done recently and her pulmonologist told her her lungs were clear. On my evaluation, pt with notably rattling  productive cough however she appeared non-toxic appearing. She denied URI sxs or possible exposure to sick contacts. High suspicion for PNA given history with sputum positive for pseudomonas in pt with bronchiectatic lung  PLAN  -c/w cefepime for now  -supplemental O2  -guafenesin prn for cough  -consider NAC for mucolytic   -would advise to get pulm on-board as pt known to service  -f/u sputum cx  -RT for chest PT  -consider additional imaging if worsening in respiratory status  -f/u legionella and strep pneumo urinary antigen    Rest of plan per resident note.

## 2021-11-29 NOTE — H&P ADULT - PROBLEM SELECTOR PLAN 2
# adrenal insuff  c/w methypred 4mg 1 tab daily #Tracheobronchomalacia s/p tracheobronchoplasty and asthma hx  -pulm called by ED (Dr. Allison)  -c/w breo elipta, singulair, spiriva

## 2021-11-29 NOTE — CONSULT NOTE ADULT - SUBJECTIVE AND OBJECTIVE BOX
CHIEF COMPLAINT: Chest discomfort    HPI: 70 y/o F w/tracheobronchomalacia s/p tracheobronchoplasty, severe persistent asthma, bronchiectasis, and colon cancer s/p colectomy presenting with chest discomfort. Patient reports that 1 day prior to admission she started to develop some L sided chest discomfort with gradual onset. No significant exacerbating or alleviating factors.     REVIEW OF SYSTEMS:  See above. ROS otherwise negative    PAST MEDICAL & SURGICAL HISTORY:  Atrial fibrillation  paroxysmal, on eliquis    Diabetes  Type 2    COPD (chronic obstructive pulmonary disease)    Adrenal insufficiency  Medrol daily for over 50 years    Aortic insufficiency  moderate AR on echo 5/3/2018    Pelvic fracture    Asthma    Tracheobronchomalacia  diagnosed 2015, s/p bronchial thermoplasty 2016 (Dr Zapien); recent bronchoscopy 6/5/2018 revealed no evidence of tracheobronchomalacia in trachea or bronchial tubes    Colorectal cancer  4/2018- last treatment , chemo and radiation    Rectal bleeding    Seizure  x 1 1/7/18    DVT (deep venous thrombosis)  15-20 years ago, took coumadin    TIA (transient ischemic attack)  multiple, last 5 years ago - presents as right-sided weakness    History of partial hysterectomy  30 years ago - fibroids    H/O total knee replacement, bilateral  5 years ago    History of sinus surgery  multiple sinus surgeries    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    H/O pelvic surgery  5 years ago - s/p fracture    History of tracheomalacia  2015 - attempted tracheal stenting ([facility])- course complicated by obstruction, respiratory failure, multiple CPR attempts -  stent discontinued; 10/20/2016 Tracheobronchoplasty (Prolene Mesh) performed at [facility] by Dr Zapien    S/P bronchoscopy  6/5/2018 - [place] (Dr Zapien) no evidence of tracheobronchomalacia in trachea or bronchial tubes    Rectal bleeding  exam under anesthesia ([facility]) 2/2018        FAMILY HISTORY:  Family history of asthma    Family history of breast cancer (Sibling)    Family history of diabetes mellitus type II        SOCIAL HISTORY:  No tobacco alcohol or drug use    Allergies    ampicillin (Short breath)  aspirin (Short breath)  Avelox (Short breath; Pruritus)  codeine (Short breath)  Dilaudid (Short breath)  iodine (Short breath; Swelling)  penicillin (Short breath)  shellfish (Anaphylaxis)  tetanus toxoid (Short breath)  Valium (Short breath)    Intolerances        HOME MEDICATIONS:  Home Medications:  apixaban 5 mg oral tablet: 1 tab(s) orally every 12 hours (29 Nov 2021 01:00)  Breo Ellipta 200 mcg-25 mcg/inh inhalation powder: 1 puff(s) inhaled once a day (29 Nov 2021 01:00)  Crestor 5 mg oral tablet: 1 tab(s) orally once a day (at bedtime) (29 Nov 2021 01:00)  dilTIAZem 30 mg oral tablet: 2 tab(s) orally every 12 hours (29 Nov 2021 01:00)  HumaLOG 100 units/mL subcutaneous solution: Sliding Scale (29 Nov 2021 01:00)  ipratropium-albuterol 0.5 mg-2.5 mg/3 mLinhalation solution: 3 milliliter(s) inhaled every 6 hours (29 Nov 2021 01:00)  Lantus 100 units/mL subcutaneous solution: 15 unit(s) subcutaneous once (at bedtime) (29 Nov 2021 01:00)  methylPREDNISolone 4 mg oral tablet: 1 tab(s) orally once a day (29 Nov 2021 01:00)  MiraLax oral powder for reconstitution: 17 gram(s) orally once a day (29 Nov 2021 01:00)  Senna 8.6 mg oral tablet: 1 tab(s) orally once a day (at bedtime) (29 Nov 2021 01:00)  Singulair 10 mg oral tablet: 1 tab(s) orally once a day (29 Nov 2021 01:00)  Spiriva 18 mcg inhalation capsule: 1 cap(s) inhaled once a day (29 Nov 2021 01:00)  Victoza 18 mg/3 mL subcutaneous solution: 1.8 milligram(s) subcutaneous once a day (29 Nov 2021 01:00)  Zofran 8 mg oral tablet: 1 tab(s) orally 3 times a day, As Needed (29 Nov 2021 01:00)          OBJECTIVE:  ICU Vital Signs Last 24 Hrs  T(C): 37.4 (29 Nov 2021 08:51), Max: 38.1 (29 Nov 2021 04:36)  T(F): 99.3 (29 Nov 2021 08:51), Max: 100.5 (29 Nov 2021 04:36)  HR: 80 (29 Nov 2021 08:51) (80 - 99)  BP: 91/54 (29 Nov 2021 08:51) (91/54 - 173/78)  BP(mean): 87 (28 Nov 2021 18:50) (80 - 87)  ABP: --  ABP(mean): --  RR: 18 (29 Nov 2021 08:51) (18 - 25)  SpO2: 94% (29 Nov 2021 08:51) (94% - 100%)        CAPILLARY BLOOD GLUCOSE      POCT Blood Glucose.: 194 mg/dL (29 Nov 2021 08:21)      PHYSICAL EXAM:  General:   HEENT:   Lymph Nodes:  Neck:   Respiratory:   Cardiovascular:   Abdomen:   Extremities:   Skin:   Neurological:  Psychiatry:    HOSPITAL MEDICATIONS:  Standing Meds:  ALBUTerol    90 MICROgram(s) HFA Inhaler 2 Puff(s) Inhalation every 6 hours  budesonide 160 MICROgram(s)/formoterol 4.5 MICROgram(s) Inhaler 2 Puff(s) Inhalation two times a day  cefepime   IVPB 1000 milliGRAM(s) IV Intermittent every 8 hours  cefepime   IVPB      chlorhexidine 4% Liquid 1 Application(s) Topical <User Schedule>  dextrose 40% Gel 15 Gram(s) Oral once  dextrose 5%. 1000 milliLiter(s) IV Continuous <Continuous>  dextrose 5%. 1000 milliLiter(s) IV Continuous <Continuous>  dextrose 50% Injectable 25 Gram(s) IV Push once  dextrose 50% Injectable 12.5 Gram(s) IV Push once  dextrose 50% Injectable 25 Gram(s) IV Push once  diltiazem    Tablet 60 milliGRAM(s) Oral every 12 hours  glucagon  Injectable 1 milliGRAM(s) IntraMuscular once  insulin lispro (ADMELOG) corrective regimen sliding scale   SubCutaneous three times a day before meals  insulin lispro (ADMELOG) corrective regimen sliding scale   SubCutaneous at bedtime  methylPREDNISolone 4 milliGRAM(s) Oral daily  mexiletine 200 milliGRAM(s) Oral three times a day  montelukast 10 milliGRAM(s) Oral daily  pantoprazole    Tablet 40 milliGRAM(s) Oral before breakfast  polyethylene glycol 3350 17 Gram(s) Oral daily  tiotropium 18 MICROgram(s) Capsule 1 Capsule(s) Inhalation daily      PRN Meds:  acetaminophen     Tablet .. 650 milliGRAM(s) Oral every 6 hours PRN  aluminum hydroxide/magnesium hydroxide/simethicone Suspension 30 milliLiter(s) Oral every 4 hours PRN  guaiFENesin Oral Liquid (Sugar-Free) 200 milliGRAM(s) Oral every 6 hours PRN  melatonin 3 milliGRAM(s) Oral at bedtime PRN  metoclopramide Injectable 10 milliGRAM(s) IV Push every 8 hours PRN      LABS:                        12.4   18.05 )-----------( 229      ( 29 Nov 2021 06:04 )             40.9     Hgb Trend: 12.4<--, 12.9<--  11-29    139  |  103  |  11  ----------------------------<  173<H>  4.1   |  24  |  0.81    Ca    8.6      29 Nov 2021 06:04    TPro  6.1  /  Alb  3.5  /  TBili  0.3  /  DBili  x   /  AST  13  /  ALT  12  /  AlkPhos  98  11-29    Creatinine Trend: 0.81<--, 0.73<--        Venous Blood Gas:  11-28 @ 16:31  7.34/55/42/30/70.5  VBG Lactate: 2.0      MICROBIOLOGY:       RADIOLOGY:  [x ] Reviewed and interpreted by me    PULMONARY FUNCTION TESTS:    EKG:   CHIEF COMPLAINT: Chest discomfort    HPI: 72 y/o F w/tracheobronchomalacia s/p tracheobronchoplasty, severe persistent asthma, bronchiectasis, and colon cancer s/p colectomy presenting with chest discomfort. Patient reports that on Friday evening she began to notice some chest discomfort. Discomfort progressively worsened along with some dyspnea and nausea so she came to the hospital. Reports breathing had been doing very well lately but recently developed a cough productive of yellowish and darkish phlegm. No reported fevers, chills, diarrhea or sick contacts.    REVIEW OF SYSTEMS:  See above. ROS otherwise negative    PAST MEDICAL & SURGICAL HISTORY:  Atrial fibrillation  paroxysmal, on eliquis    Diabetes  Type 2    COPD (chronic obstructive pulmonary disease)    Adrenal insufficiency  Medrol daily for over 50 years    Aortic insufficiency  moderate AR on echo 5/3/2018    Pelvic fracture    Asthma    Tracheobronchomalacia  diagnosed 2015, s/p bronchial thermoplasty 2016 (Dr Zapien); recent bronchoscopy 6/5/2018 revealed no evidence of tracheobronchomalacia in trachea or bronchial tubes    Colorectal cancer  4/2018- last treatment , chemo and radiation    Rectal bleeding    Seizure  x 1 1/7/18    DVT (deep venous thrombosis)  15-20 years ago, took coumadin    TIA (transient ischemic attack)  multiple, last 5 years ago - presents as right-sided weakness    History of partial hysterectomy  30 years ago - fibroids    H/O total knee replacement, bilateral  5 years ago    History of sinus surgery  multiple sinus surgeries    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)- course complicated by obstruction, respiratory failure, multiple CPR attempts -  stent discontinued; 10/20/2016 Tracheobronchoplasty (Prolene Mesh) performed at [facility] by Dr Zapien    S/P bronchoscopy  6/5/2018 - [place] (Dr Zapien) no evidence of tracheobronchomalacia in trachea or bronchial tubes    Rectal bleeding  exam under anesthesia ([facility]) 2/2018        FAMILY HISTORY:  Family history of asthma    Family history of breast cancer (Sibling)    Family history of diabetes mellitus type II        SOCIAL HISTORY:  No tobacco alcohol or drug use    Allergies    ampicillin (Short breath)  aspirin (Short breath)  Avelox (Short breath; Pruritus)  codeine (Short breath)  Dilaudid (Short breath)  iodine (Short breath; Swelling)  penicillin (Short breath)  shellfish (Anaphylaxis)  tetanus toxoid (Short breath)  Valium (Short breath)    Intolerances        HOME MEDICATIONS:  Home Medications:  apixaban 5 mg oral tablet: 1 tab(s) orally every 12 hours (29 Nov 2021 01:00)  Breo Ellipta 200 mcg-25 mcg/inh inhalation powder: 1 puff(s) inhaled once a day (29 Nov 2021 01:00)  Crestor 5 mg oral tablet: 1 tab(s) orally once a day (at bedtime) (29 Nov 2021 01:00)  dilTIAZem 30 mg oral tablet: 2 tab(s) orally every 12 hours (29 Nov 2021 01:00)  HumaLOG 100 units/mL subcutaneous solution: Sliding Scale (29 Nov 2021 01:00)  ipratropium-albuterol 0.5 mg-2.5 mg/3 mLinhalation solution: 3 milliliter(s) inhaled every 6 hours (29 Nov 2021 01:00)  Lantus 100 units/mL subcutaneous solution: 15 unit(s) subcutaneous once (at bedtime) (29 Nov 2021 01:00)  methylPREDNISolone 4 mg oral tablet: 1 tab(s) orally once a day (29 Nov 2021 01:00)  MiraLax oral powder for reconstitution: 17 gram(s) orally once a day (29 Nov 2021 01:00)  Senna 8.6 mg oral tablet: 1 tab(s) orally once a day (at bedtime) (29 Nov 2021 01:00)  Singulair 10 mg oral tablet: 1 tab(s) orally once a day (29 Nov 2021 01:00)  Spiriva 18 mcg inhalation capsule: 1 cap(s) inhaled once a day (29 Nov 2021 01:00)  Victoza 18 mg/3 mL subcutaneous solution: 1.8 milligram(s) subcutaneous once a day (29 Nov 2021 01:00)  Zofran 8 mg oral tablet: 1 tab(s) orally 3 times a day, As Needed (29 Nov 2021 01:00)          OBJECTIVE:  ICU Vital Signs Last 24 Hrs  T(C): 37.4 (29 Nov 2021 08:51), Max: 38.1 (29 Nov 2021 04:36)  T(F): 99.3 (29 Nov 2021 08:51), Max: 100.5 (29 Nov 2021 04:36)  HR: 80 (29 Nov 2021 08:51) (80 - 99)  BP: 91/54 (29 Nov 2021 08:51) (91/54 - 173/78)  BP(mean): 87 (28 Nov 2021 18:50) (80 - 87)  ABP: --  ABP(mean): --  RR: 18 (29 Nov 2021 08:51) (18 - 25)  SpO2: 94% (29 Nov 2021 08:51) (94% - 100%)        CAPILLARY BLOOD GLUCOSE      POCT Blood Glucose.: 194 mg/dL (29 Nov 2021 08:21)      PHYSICAL EXAM:  General: Elderly female sitting comfortably in bed, NAD  HEENT: NC/AT, sclerae anicteric  Neck: Supple  Respiratory: No increased WOB, ronchorous breath sounds on L side  Cardiovascular: S1, S2  Abdomen: Soft, + BS  Extremities: WWP  Neurological: Awake, alert, follows commands  Psychiatry: Appropriate affect    HOSPITAL MEDICATIONS:  Standing Meds:  ALBUTerol    90 MICROgram(s) HFA Inhaler 2 Puff(s) Inhalation every 6 hours  budesonide 160 MICROgram(s)/formoterol 4.5 MICROgram(s) Inhaler 2 Puff(s) Inhalation two times a day  cefepime   IVPB 1000 milliGRAM(s) IV Intermittent every 8 hours  cefepime   IVPB      chlorhexidine 4% Liquid 1 Application(s) Topical <User Schedule>  dextrose 40% Gel 15 Gram(s) Oral once  dextrose 5%. 1000 milliLiter(s) IV Continuous <Continuous>  dextrose 5%. 1000 milliLiter(s) IV Continuous <Continuous>  dextrose 50% Injectable 25 Gram(s) IV Push once  dextrose 50% Injectable 12.5 Gram(s) IV Push once  dextrose 50% Injectable 25 Gram(s) IV Push once  diltiazem    Tablet 60 milliGRAM(s) Oral every 12 hours  glucagon  Injectable 1 milliGRAM(s) IntraMuscular once  insulin lispro (ADMELOG) corrective regimen sliding scale   SubCutaneous three times a day before meals  insulin lispro (ADMELOG) corrective regimen sliding scale   SubCutaneous at bedtime  methylPREDNISolone 4 milliGRAM(s) Oral daily  mexiletine 200 milliGRAM(s) Oral three times a day  montelukast 10 milliGRAM(s) Oral daily  pantoprazole    Tablet 40 milliGRAM(s) Oral before breakfast  polyethylene glycol 3350 17 Gram(s) Oral daily  tiotropium 18 MICROgram(s) Capsule 1 Capsule(s) Inhalation daily      PRN Meds:  acetaminophen     Tablet .. 650 milliGRAM(s) Oral every 6 hours PRN  aluminum hydroxide/magnesium hydroxide/simethicone Suspension 30 milliLiter(s) Oral every 4 hours PRN  guaiFENesin Oral Liquid (Sugar-Free) 200 milliGRAM(s) Oral every 6 hours PRN  melatonin 3 milliGRAM(s) Oral at bedtime PRN  metoclopramide Injectable 10 milliGRAM(s) IV Push every 8 hours PRN      LABS:                        12.4   18.05 )-----------( 229      ( 29 Nov 2021 06:04 )             40.9     Hgb Trend: 12.4<--, 12.9<--  11-29    139  |  103  |  11  ----------------------------<  173<H>  4.1   |  24  |  0.81    Ca    8.6      29 Nov 2021 06:04    TPro  6.1  /  Alb  3.5  /  TBili  0.3  /  DBili  x   /  AST  13  /  ALT  12  /  AlkPhos  98  11-29    Creatinine Trend: 0.81<--, 0.73<--        Venous Blood Gas:  11-28 @ 16:31  7.34/55/42/30/70.5  VBG Lactate: 2.0      MICROBIOLOGY:       RADIOLOGY:  [x ] Reviewed and interpreted by me    PULMONARY FUNCTION TESTS:    EKG:

## 2021-11-29 NOTE — PROGRESS NOTE ADULT - PROBLEM SELECTOR PLAN 1
#chest discomfort and SOB  Ddx:  PNA vs PE (hx of PE, but on eliquis),tracheobronchomalacia, asthma/COPD, HF  Leukocytosis of 12, borderline T 99.3. BNP mildly elevated at 322. trop 18 --> 20. EKG with ST elevations in septal leads??  No wheezing on exam, but some crackles at bases. Intermitt rumbling sound , ? transmitted sounds from her tracheomalacia  CXR "without acute findings" wet read showing clear lung, trachea may be more narrow appearing?. Do not see any consolidations on my read  Echo 11/2021 - no significant changes from previous echo. Mild diastolic dysfunction w/ normal LV . Hx of pseudomonas in Feb 2020.  Holding off additional imaging for now  Received suraj 1g in ED.  [ ] f/u bcx,   [ ]sputum   Abx: cefipime #chest discomfort and SOB  Ddx:  PNA vs PE (hx of PE, but on eliquis),tracheobronchomalacia, asthma/COPD, HF  Leukocytosis of 12, borderline T 99.3. BNP mildly elevated at 322. trop 18 --> 20. EKG with ST elevations in septal leads??  No wheezing on exam, but some crackles at bases. Intermitt rumbling sound , ? transmitted sounds from her tracheomalacia  CXR "without acute findings" wet read showing clear lung, trachea may be more narrow appearing?. Do not see any consolidations on my read  Echo 11/2021 - no significant changes from previous echo. Mild diastolic dysfunction w/ normal LV . Hx of pseudomonas in Feb 2020.  Holding off additional imaging for now  Received suraj 1g in ED.  - f/u blood and sputum cx, urine legionella   - c/w cefipime (11/29- )

## 2021-11-29 NOTE — H&P ADULT - ASSESSMENT
71F with history of Tracheobronchomalasia s/p Tracheobronchoplasty, Bronchiectasis, PVCs/ bigeminy,Severe Allergic Asthma, Adrenal Insuffiencey, DM2, HTN, Colorectal cancer s/p total colectomy now with colostomy, PAF on Eliquis p/w 1 day of chest discomfort associated with SOB.    #chest discomfort and SOB  Ddx:  PNA vs PE (hx of PE, but on eliquis),tracheobronchomalacia, asthma/COPD, HF  Leukocytosis of 12, borderline T 99.3. BNP mildly elevated at 322. trop 18 --> 20. EKG with ST elevations in septal leads??  No wheezing on exam, but some crackles at bases. Intermitt rumbling sound , ? transmitted sounds from her tracheomalacia  CXR "without acute findings" wet read showing clear lung, trachea may be more narrow appearing?. Do not see any consolidations on my read  Echo 11/2021 - no significant changes from previous echo. Mild diastolic dysfunction w/ normal LV   [ ] CTA to r/o PE? and eval for any evid of PNA,-with neck to eval tracheobronchomalacia?      #Tracheobronchomalacia and asthma hx  -pulm called by ED (Dr. Allison)  -c/w breo elipta, singulair, spiriva    # PAF on eliquis  s/p 5mg in ED  currently in sinus on EKG  c/w eliquis    #PVCs with bigeminy  c/w mexilitine 200mg TID and diltiazem 60mg q12    # adrenal insuff  c/w methypred 4mg 1 tab daily    #IDDM  Takes lantus 15u qhs, and humalog ISS, victoza, at home  Currently with nausea and poor PO intake,   can give reduce dose of lantus 5U, and ISS    #prophylactic measure  DVT ppx- c/w eliquis  Diet regular    71F with history of Tracheobronchomalasia s/p Tracheobronchoplasty, Bronchiectasis, PVCs/ bigeminy,Severe Allergic Asthma, Adrenal Insuffiencey, DM2, HTN, Colorectal cancer s/p total colectomy now with colostomy, PAF on Eliquis p/w 1 day of chest discomfort associated with SOB.    #sepsis chest discomfort and SOB  Ddx:  PNA vs PE (hx of PE, but on eliquis),tracheobronchomalacia, asthma/COPD, HF  Leukocytosis of 12, borderline T 99.3. BNP mildly elevated at 322. trop 18 --> 20. EKG with ST elevations in septal leads??  No wheezing on exam, but some crackles at bases. Intermitt rumbling sound , ? transmitted sounds from her tracheomalacia  CXR "without acute findings" wet read showing clear lung, trachea may be more narrow appearing?. Do not see any consolidations on my read  Echo 11/2021 - no significant changes from previous echo. Mild diastolic dysfunction w/ normal LV . Hx of pseudomonas in Feb 2020.  Received suraj 1g in ED.  [ ] CTA to r/o PE? and eval for any evid of PNA,-with neck to eval tracheobronchomalacia?  [ ] f/u bcx  Abx: ?zosyn               #prophylactic measure  DVT ppx- c/w eliquis  Diet regular    71F with history of Tracheobronchomalasia s/p Tracheobronchoplasty, Bronchiectasis, PVCs/ bigeminy,Severe Allergic Asthma, Adrenal Insuffiencey, DM2, HTN, Colorectal cancer s/p total colectomy now with colostomy, PAF on Eliquis p/w 1 day of chest discomfort associated with SOB.                #prophylactic measure  DVT ppx- c/w eliquis  Diet regular    71F with history of Tracheobronchomalasia s/p Tracheobronchoplasty, Bronchiectasis, PVCs/ bigeminy,Severe Allergic Asthma, Adrenal Insuffiencey, DM2, HTN, Colorectal cancer s/p total colectomy now with colostomy, PAF on Eliquis p/w 1 day of chest discomfort associated with SOB.

## 2021-11-29 NOTE — H&P ADULT - PROBLEM SELECTOR PLAN 1
#Tracheobronchomalacia and asthma hx  -pulm called by ED (Dr. Allison)  -c/w breo elipta, singulair, spiriva #chest discomfort and SOB  Ddx:  PNA vs PE (hx of PE, but on eliquis),tracheobronchomalacia, asthma/COPD, HF  Leukocytosis of 12, borderline T 99.3. BNP mildly elevated at 322. trop 18 --> 20. EKG with ST elevations in septal leads??  No wheezing on exam, but some crackles at bases. Intermitt rumbling sound , ? transmitted sounds from her tracheomalacia  CXR "without acute findings" wet read showing clear lung, trachea may be more narrow appearing?. Do not see any consolidations on my read  Echo 11/2021 - no significant changes from previous echo. Mild diastolic dysfunction w/ normal LV . Hx of pseudomonas in Feb 2020.  Holding off additional imaging for now  Received suraj 1g in ED.  [ ] f/u bcx,   [ ]sputum   Abx: zosyn #chest discomfort and SOB  Ddx:  PNA vs PE (hx of PE, but on eliquis),tracheobronchomalacia, asthma/COPD, HF  Leukocytosis of 12, borderline T 99.3. BNP mildly elevated at 322. trop 18 --> 20. EKG with ST elevations in septal leads??  No wheezing on exam, but some crackles at bases. Intermitt rumbling sound , ? transmitted sounds from her tracheomalacia  CXR "without acute findings" wet read showing clear lung, trachea may be more narrow appearing?. Do not see any consolidations on my read  Echo 11/2021 - no significant changes from previous echo. Mild diastolic dysfunction w/ normal LV . Hx of pseudomonas in Feb 2020.  Holding off additional imaging for now  Received suraj 1g in ED.  [ ] f/u bcx,   [ ]sputum   Abx: cefipime

## 2021-11-29 NOTE — PATIENT PROFILE ADULT - NSPROIMPLANTSMEDDEV_GEN_A_NUR
R chest chemotherapy port, L eye prosthetic, LLQ colostomy/Prosthetic device(s)/Vascular access device

## 2021-11-29 NOTE — PROGRESS NOTE ADULT - PROBLEM SELECTOR PLAN 4
#IDDM  Takes lantus 15u qhs, and humalog ISS, victoza, at home  Currently with nausea and poor PO intake,   can give reduce dose of lantus 5U, and ISS #IDDM  Takes lantus 15u qhs, and humalog ISS, victoza, at home  Currently with nausea and poor PO intake,   - ISS

## 2021-11-29 NOTE — H&P ADULT - HISTORY OF PRESENT ILLNESS
71F with history of Tracheobronchomalasia s/p Tracheobronchoplasty, Bronchiectasis, Severe Allergic Asthma, Adrenal Insuffiencey, Port, DM2, HTN, Colorectal cancer s/p total colectomy now with colostomy, PAF on Eliquis p/w 1 day of chest discomfort associated with SOB. States yesterday evening she noticed gradual onset chest discomfort. She had to reposition herself to help with the sx. She called her MD who told her to go to the hospital. She reports the chest discomfort is 9/10, intermittent, no palliating or exacerbating factors, "tightness" in sensation, and associated with SOB and nausea. States had chronic cough,  has been making sputum. Denies any actual chest pain, abd pain, vomiting, changes in colostomy output. Denies any recent illness, rhinorrhea, HA, LE edema. Felt warm, but temp at home was 97. States has had chest discomfort in the past and at that time ended up being diagnosed with an infection (per chart review, hx of pseudomonas on bronchial cx in Feb 2020.    In the ED: T 99.3, HR 80, -170s, satting mid 90s on 2L nc. Given zofran, diltiazem 30 x1, eliquis 5mg x1, tylenol 650 x1, and reglan 10, and suraj 1g.   CXR w 71F with history of Tracheobronchomalasia s/p Tracheobronchoplasty, Bronchiectasis, Severe Allergic Asthma, Adrenal Insuffiencey, Port, DM2, HTN, Colorectal cancer s/p total colectomy now with colostomy, PAF on Eliquis p/w 1 day of chest discomfort associated with SOB. States yesterday evening she noticed gradual onset chest discomfort. She had to reposition herself to help with the sx. She called her MD who told her to go to the hospital. She reports the chest discomfort is 9/10, intermittent, no palliating or exacerbating factors, "tightness" in sensation, and associated with SOB and nausea. States had chronic cough,  has been making sputum. Denies any actual chest pain, abd pain, vomiting, changes in colostomy output. Denies any recent illness, rhinorrhea, HA, LE edema. Felt warm, but temp at home was 97. States has had chest discomfort in the past and at that time ended up being diagnosed with an infection (per chart review, hx of pseudomonas on bronchial cx in Feb 2020, sensitive to zosyn).    In the ED: T 99.3, HR 80, -170s, satting mid 90s on 2L nc. Given zofran, diltiazem 30 x1, eliquis 5mg x1, tylenol 650 x1, and reglan 10, and suraj 1g.   CXR w

## 2021-11-29 NOTE — H&P ADULT - NSICDXPASTSURGICALHX_GEN_ALL_CORE_FT
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    H/O pelvic surgery 5 years ago - s/p fracture    H/O total knee replacement, bilateral 5 years ago    History of partial hysterectomy 30 years ago - fibroids    History of sinus surgery multiple sinus surgeries    History of tracheomalacia 2015 - attempted tracheal stenting ([facility])- course complicated by obstruction, respiratory failure, multiple CPR attempts -  stent discontinued; 10/20/2016 Tracheobronchoplasty (Prolene Mesh) performed at [facility] by Dr Zapien    Rectal bleeding exam under anesthesia ([facility]) 2/2018    S/P bronchoscopy 6/5/2018 - [place] [facility] (Dr Zapien) no evidence of tracheobronchomalacia in trachea or bronchial tubes

## 2021-11-29 NOTE — H&P ADULT - NSHPPHYSICALEXAM_GEN_ALL_CORE
VITALS:   T(C): 36.2 (11-28-21 @ 20:17), Max: 37.6 (11-28-21 @ 19:18)  HR: 92 (11-28-21 @ 20:17) (80 - 93)  BP: 115/66 (11-28-21 @ 20:17) (103/74 - 173/78)  RR: 20 (11-28-21 @ 20:17) (20 - 25)  SpO2: 96% (11-28-21 @ 20:17) (96% - 100%)    GENERAL: NAD, lying in bed comfortably  HEAD:  Atraumatic, Normocephalic  EYES: EOMI, PERRLA, conjunctiva and sclera clear  ENT: Moist mucous membranes  NECK: Supple, No JVD  CHEST/LUNG: comfortable on 2L nc, crackles at bases, intermittent rumbling sounds which sound transmitted from upper airway  HEART: Regular rate and rhythm; No murmurs, rubs, or gallops + intermittent rumbling sound (?referred from trachea?)  ABDOMEN: BSx4; Soft, nontender, nondistended  EXTREMITIES:  2+ Peripheral Pulses, brisk capillary refill. No clubbing, cyanosis, or edema  NERVOUS SYSTEM:  A&Ox3, no focal deficits   SKIN: No rashes or lesions  Psych: Normal speech, normal behavior, normal affect VITALS:   T(C): 36.2 (11-28-21 @ 20:17), Max: 37.6 (11-28-21 @ 19:18)  HR: 92 (11-28-21 @ 20:17) (80 - 93)  BP: 115/66 (11-28-21 @ 20:17) (103/74 - 173/78)  RR: 20 (11-28-21 @ 20:17) (20 - 25)  SpO2: 96% (11-28-21 @ 20:17) (96% - 100%)    GENERAL: NAD, lying in bed comfortably  HEAD:  Atraumatic, Normocephalic  EYES: R eye injected, L eye false eye  ENT: Moist mucous membranes  NECK: Supple, No JVD  CHEST/LUNG: comfortable on 2L nc, crackles at bases, intermittent rumbling sounds which sound transmitted from upper airway  HEART: Regular rate and rhythm; No murmurs, rubs, or gallops   ABDOMEN: BSx4; Soft, nontender, nondistended + colostomy brown output  EXTREMITIES:  2+ Peripheral Pulses, brisk capillary refill. No clubbing, cyanosis, or edema  NERVOUS SYSTEM:  A&Ox3, no focal deficits   SKIN: No rashes or lesions  Psych: Normal speech, normal behavior, normal affect

## 2021-11-29 NOTE — H&P ADULT - PROBLEM SELECTOR PLAN 4
#IDDM  Takes lantus 15u qhs, and humalog ISS, victoza, at home  Currently with nausea and poor PO intake,   can give reduce dose of lantus 5U, and ISS

## 2021-11-29 NOTE — PROGRESS NOTE ADULT - PROBLEM SELECTOR PLAN 6
#prophylactic measure  DVT ppx- c/w eliquis  Diet regular #prophylactic measure  DVT ppx- c/w eliquis  Diet regular    Called daughter to update 11/29, no answer #prophylactic measure  DVT ppx- c/w eliquis  Diet regular    daughter updated 11/29

## 2021-11-29 NOTE — PROGRESS NOTE ADULT - ASSESSMENT
71F with history of Tracheobronchomalasia s/p Tracheobronchoplasty, Bronchiectasis, PVCs/ bigeminy,Severe Allergic Asthma, Adrenal Insuffiencey, DM2, HTN, Colorectal cancer s/p total colectomy now with colostomy, PAF on Eliquis p/w 1 day of chest discomfort associated with SOB.

## 2021-11-29 NOTE — H&P ADULT - PROBLEM SELECTOR PLAN 5
#PVCs with bigeminy  c/w mexilitine 200mg TID and diltiazem 60mg q12    # PAF on eliquis  s/p 5mg in ED  currently in sinus on EKG  c/w eliquis

## 2021-11-29 NOTE — PROGRESS NOTE ADULT - ATTENDING COMMENTS
-Already improving with IV abx. -Sputum culture pending.   -F/u pulm recs.   -F/u CT chest.   -Supportive care.

## 2021-11-29 NOTE — PROGRESS NOTE ADULT - SUBJECTIVE AND OBJECTIVE BOX
Bozena Limon | PGY-1  Internal Medicine  Pager: [phone] NS/ [phone] J    OVERNIGHT EVENTS: no overnight events      SUBJECTIVE: Patient was examined at bedside this morning. Appeared comfortable. Overnight vitals and monitoring results were reviewed. Reporting no complaints. Denied chest pain, SOB, abdominal pain, vomiting, diarrhea, constipation.       MEDICATIONS  (STANDING):  ALBUTerol    90 MICROgram(s) HFA Inhaler 2 Puff(s) Inhalation every 6 hours  budesonide 160 MICROgram(s)/formoterol 4.5 MICROgram(s) Inhaler 2 Puff(s) Inhalation two times a day  cefepime   IVPB 1000 milliGRAM(s) IV Intermittent every 8 hours  cefepime   IVPB      dextrose 40% Gel 15 Gram(s) Oral once  dextrose 5%. 1000 milliLiter(s) (50 mL/Hr) IV Continuous <Continuous>  dextrose 5%. 1000 milliLiter(s) (100 mL/Hr) IV Continuous <Continuous>  dextrose 50% Injectable 25 Gram(s) IV Push once  dextrose 50% Injectable 12.5 Gram(s) IV Push once  dextrose 50% Injectable 25 Gram(s) IV Push once  diltiazem    Tablet 60 milliGRAM(s) Oral every 12 hours  glucagon  Injectable 1 milliGRAM(s) IntraMuscular once  insulin lispro (ADMELOG) corrective regimen sliding scale   SubCutaneous three times a day before meals  insulin lispro (ADMELOG) corrective regimen sliding scale   SubCutaneous at bedtime  methylPREDNISolone 4 milliGRAM(s) Oral daily  mexiletine 200 milliGRAM(s) Oral three times a day  montelukast 10 milliGRAM(s) Oral daily  pantoprazole    Tablet 40 milliGRAM(s) Oral before breakfast  polyethylene glycol 3350 17 Gram(s) Oral daily  tiotropium 18 MICROgram(s) Capsule 1 Capsule(s) Inhalation daily    MEDICATIONS  (PRN):  acetaminophen     Tablet .. 650 milliGRAM(s) Oral every 6 hours PRN Temp greater or equal to 38C (100.4F), Mild Pain (1 - 3)  aluminum hydroxide/magnesium hydroxide/simethicone Suspension 30 milliLiter(s) Oral every 4 hours PRN Dyspepsia  melatonin 3 milliGRAM(s) Oral at bedtime PRN Insomnia  metoclopramide Injectable 10 milliGRAM(s) IV Push every 8 hours PRN nausea        T(F): 100 (11-29-21 @ 04:50), Max: 100.5 (11-29-21 @ 04:36)  HR: 99 (11-29-21 @ 04:36) (80 - 99)  BP: 115/66 (11-29-21 @ 04:36) (103/74 - 173/78)  BP(mean): 87 (11-28-21 @ 18:50) (80 - 87)  RR: 20 (11-29-21 @ 04:36) (20 - 25)  SpO2: 95% (11-29-21 @ 04:36) (95% - 100%)    PHYSICAL EXAM:     GENERAL: NAD, lying in bed comfortably  HEAD:  Atraumatic, Normocephalic  EYES:  R eye injected, L eye false eye  ENT: Moist mucous membranes,   NECK: Supple, No JVD, trachea midline  CHEST/LUNG: Clear to auscultation bilaterally; No rales, rhonchi, wheezing, or rubs. Unlabored respirations  HEART: Regular rate and rhythm; No murmurs, rubs, or gallops, normal S1/S2  ABDOMEN: normal bowel sounds; Soft, nontender, nondistended, no organomegaly.  + colostomy brown output  EXTREMITIES:  2+ Peripheral Pulses, brisk capillary refill. No clubbing, cyanosis, or edema  MSK: No gross deformities noted   Neurological:  A&Ox3, no focal deficits   SKIN: No rashes or lesions  PSYCH: Normal mood, affect     TELEMETRY:    LABS:                        12.4   18.05 )-----------( 229      ( 29 Nov 2021 06:04 )             40.9     11-29    139  |  103  |  11  ----------------------------<  173<H>  4.1   |  24  |  0.81    Ca    8.6      29 Nov 2021 06:04    TPro  6.1  /  Alb  3.5  /  TBili  0.3  /  DBili  x   /  AST  13  /  ALT  12  /  AlkPhos  98  11-29          Blood Gas Profile w/Lytes - Venous: Performed In Lab (11-28-21 @ 16:31)    Creatinine Trend: 0.81<--, 0.73<--  I&O's Summary    BNPSerum Pro-Brain Natriuretic Peptide: 322 pg/mL (11-28 @ 16:31)    Blood Gas Profile w/Lytes - Venous: Performed In Lab (11-28-21 @ 16:31)    RADIOLOGY & ADDITIONAL STUDIES:             Bozena Limon | PGY-1  Internal Medicine  Pager: [phone] NS/ [phone] J    OVERNIGHT EVENTS: no overnight events      SUBJECTIVE: Patient was examined at bedside this morning. Appeared comfortable. Overnight vitals and monitoring results were reviewed. Reporting no complaints. Denied chest pain, SOB, abdominal pain, vomiting, diarrhea, constipation.       MEDICATIONS  (STANDING):  ALBUTerol    90 MICROgram(s) HFA Inhaler 2 Puff(s) Inhalation every 6 hours  budesonide 160 MICROgram(s)/formoterol 4.5 MICROgram(s) Inhaler 2 Puff(s) Inhalation two times a day  cefepime   IVPB 1000 milliGRAM(s) IV Intermittent every 8 hours  cefepime   IVPB      dextrose 40% Gel 15 Gram(s) Oral once  dextrose 5%. 1000 milliLiter(s) (50 mL/Hr) IV Continuous <Continuous>  dextrose 5%. 1000 milliLiter(s) (100 mL/Hr) IV Continuous <Continuous>  dextrose 50% Injectable 25 Gram(s) IV Push once  dextrose 50% Injectable 12.5 Gram(s) IV Push once  dextrose 50% Injectable 25 Gram(s) IV Push once  diltiazem    Tablet 60 milliGRAM(s) Oral every 12 hours  glucagon  Injectable 1 milliGRAM(s) IntraMuscular once  insulin lispro (ADMELOG) corrective regimen sliding scale   SubCutaneous three times a day before meals  insulin lispro (ADMELOG) corrective regimen sliding scale   SubCutaneous at bedtime  methylPREDNISolone 4 milliGRAM(s) Oral daily  mexiletine 200 milliGRAM(s) Oral three times a day  montelukast 10 milliGRAM(s) Oral daily  pantoprazole    Tablet 40 milliGRAM(s) Oral before breakfast  polyethylene glycol 3350 17 Gram(s) Oral daily  tiotropium 18 MICROgram(s) Capsule 1 Capsule(s) Inhalation daily    MEDICATIONS  (PRN):  acetaminophen     Tablet .. 650 milliGRAM(s) Oral every 6 hours PRN Temp greater or equal to 38C (100.4F), Mild Pain (1 - 3)  aluminum hydroxide/magnesium hydroxide/simethicone Suspension 30 milliLiter(s) Oral every 4 hours PRN Dyspepsia  melatonin 3 milliGRAM(s) Oral at bedtime PRN Insomnia  metoclopramide Injectable 10 milliGRAM(s) IV Push every 8 hours PRN nausea        T(F): 100 (11-29-21 @ 04:50), Max: 100.5 (11-29-21 @ 04:36)  HR: 99 (11-29-21 @ 04:36) (80 - 99)  BP: 115/66 (11-29-21 @ 04:36) (103/74 - 173/78)  BP(mean): 87 (11-28-21 @ 18:50) (80 - 87)  RR: 20 (11-29-21 @ 04:36) (20 - 25)  SpO2: 95% (11-29-21 @ 04:36) (95% - 100%)    PHYSICAL EXAM:     GENERAL: NAD, lying in bed comfortably  HEAD:  Atraumatic, Normocephalic  EYES:  R eye injected, L eye false eye  ENT: Moist mucous membranes,   NECK: Supple, No JVD, trachea midline  CHEST/LUNG: on 2L NC. Crackles b/l lower lobes   HEART: Regular rate and rhythm; No murmurs, rubs, or gallops, normal S1/S2  ABDOMEN: normal bowel sounds; Soft, nontender, nondistended, no organomegaly.  + colostomy brown output  EXTREMITIES:  2+ Peripheral Pulses, brisk capillary refill. No clubbing, cyanosis, or edema  MSK: No gross deformities noted   Neurological:  A&Ox3, no focal deficits   SKIN: No rashes or lesions  PSYCH: Normal mood, affect     TELEMETRY:    LABS:                        12.4   18.05 )-----------( 229      ( 29 Nov 2021 06:04 )             40.9     11-29    139  |  103  |  11  ----------------------------<  173<H>  4.1   |  24  |  0.81    Ca    8.6      29 Nov 2021 06:04    TPro  6.1  /  Alb  3.5  /  TBili  0.3  /  DBili  x   /  AST  13  /  ALT  12  /  AlkPhos  98  11-29          Blood Gas Profile w/Lytes - Venous: Performed In Lab (11-28-21 @ 16:31)    Creatinine Trend: 0.81<--, 0.73<--  I&O's Summary    BNPSerum Pro-Brain Natriuretic Peptide: 322 pg/mL (11-28 @ 16:31)    Blood Gas Profile w/Lytes - Venous: Performed In Lab (11-28-21 @ 16:31)    RADIOLOGY & ADDITIONAL STUDIES:

## 2021-11-29 NOTE — H&P ADULT - NSHPLABSRESULTS_GEN_ALL_CORE
.  LABS:                         12.9   12.24 )-----------( 257      ( 28 Nov 2021 16:31 )             42.1     11-28    141  |  104  |  11  ----------------------------<  180<H>  3.6   |  25  |  0.73    Ca    8.7      28 Nov 2021 16:31    TPro  6.7  /  Alb  4.1  /  TBili  0.3  /  DBili  x   /  AST  15  /  ALT  15  /  AlkPhos  109  11-28        Serum Pro-Brain Natriuretic Peptide: 322 pg/mL (11-28 @ 16:31)        RADIOLOGY, EKG & ADDITIONAL TESTS: Reviewed. I have personally reviewed imaging, clear lungs  I have personally reviewed EKG, no acute ischemia  LABS:                         12.9   12.24 )-----------( 257      ( 28 Nov 2021 16:31 )             42.1     11-28    141  |  104  |  11  ----------------------------<  180<H>  3.6   |  25  |  0.73    Ca    8.7      28 Nov 2021 16:31    TPro  6.7  /  Alb  4.1  /  TBili  0.3  /  DBili  x   /  AST  15  /  ALT  15  /  AlkPhos  109  11-28        Serum Pro-Brain Natriuretic Peptide: 322 pg/mL (11-28 @ 16:31)        RADIOLOGY, EKG & ADDITIONAL TESTS: Reviewed.

## 2021-11-29 NOTE — H&P ADULT - NSHPSOCIALHISTORY_GEN_ALL_CORE
Lives with her sister  has adult daughter in the area  former     never smoker  denies etoh or illicit drug use  uses walker at baseline, but very active doing gardening

## 2021-11-30 ENCOUNTER — TRANSCRIPTION ENCOUNTER (OUTPATIENT)
Age: 71
End: 2021-11-30

## 2021-11-30 LAB
A1C WITH ESTIMATED AVERAGE GLUCOSE RESULT: 7.8 % — HIGH (ref 4–5.6)
ALBUMIN SERPL ELPH-MCNC: 3.3 G/DL — SIGNIFICANT CHANGE UP (ref 3.3–5)
ALP SERPL-CCNC: 100 U/L — SIGNIFICANT CHANGE UP (ref 40–120)
ALT FLD-CCNC: 10 U/L — SIGNIFICANT CHANGE UP (ref 10–45)
ANION GAP SERPL CALC-SCNC: 10 MMOL/L — SIGNIFICANT CHANGE UP (ref 5–17)
APPEARANCE UR: CLEAR — SIGNIFICANT CHANGE UP
AST SERPL-CCNC: 11 U/L — SIGNIFICANT CHANGE UP (ref 10–40)
BASOPHILS # BLD AUTO: 0.02 K/UL — SIGNIFICANT CHANGE UP (ref 0–0.2)
BASOPHILS NFR BLD AUTO: 0.1 % — SIGNIFICANT CHANGE UP (ref 0–2)
BILIRUB SERPL-MCNC: 0.3 MG/DL — SIGNIFICANT CHANGE UP (ref 0.2–1.2)
BILIRUB UR-MCNC: NEGATIVE — SIGNIFICANT CHANGE UP
BUN SERPL-MCNC: 9 MG/DL — SIGNIFICANT CHANGE UP (ref 7–23)
CALCIUM SERPL-MCNC: 8.5 MG/DL — SIGNIFICANT CHANGE UP (ref 8.4–10.5)
CHLORIDE SERPL-SCNC: 102 MMOL/L — SIGNIFICANT CHANGE UP (ref 96–108)
CO2 SERPL-SCNC: 25 MMOL/L — SIGNIFICANT CHANGE UP (ref 22–31)
COLOR SPEC: YELLOW — SIGNIFICANT CHANGE UP
CREAT SERPL-MCNC: 0.7 MG/DL — SIGNIFICANT CHANGE UP (ref 0.5–1.3)
DIFF PNL FLD: ABNORMAL
EOSINOPHIL # BLD AUTO: 0.1 K/UL — SIGNIFICANT CHANGE UP (ref 0–0.5)
EOSINOPHIL NFR BLD AUTO: 0.6 % — SIGNIFICANT CHANGE UP (ref 0–6)
ESTIMATED AVERAGE GLUCOSE: 177 MG/DL — HIGH (ref 68–114)
GLUCOSE SERPL-MCNC: 151 MG/DL — HIGH (ref 70–99)
GLUCOSE UR QL: ABNORMAL
GRAM STN FLD: SIGNIFICANT CHANGE UP
HCT VFR BLD CALC: 36.1 % — SIGNIFICANT CHANGE UP (ref 34.5–45)
HGB BLD-MCNC: 11.2 G/DL — LOW (ref 11.5–15.5)
IMM GRANULOCYTES NFR BLD AUTO: 0.6 % — SIGNIFICANT CHANGE UP (ref 0–1.5)
KETONES UR-MCNC: NEGATIVE — SIGNIFICANT CHANGE UP
LEGIONELLA AG UR QL: NEGATIVE — SIGNIFICANT CHANGE UP
LEUKOCYTE ESTERASE UR-ACNC: NEGATIVE — SIGNIFICANT CHANGE UP
LYMPHOCYTES # BLD AUTO: 1.46 K/UL — SIGNIFICANT CHANGE UP (ref 1–3.3)
LYMPHOCYTES # BLD AUTO: 8.7 % — LOW (ref 13–44)
MAGNESIUM SERPL-MCNC: 1.9 MG/DL — SIGNIFICANT CHANGE UP (ref 1.6–2.6)
MCHC RBC-ENTMCNC: 24 PG — LOW (ref 27–34)
MCHC RBC-ENTMCNC: 31 GM/DL — LOW (ref 32–36)
MCV RBC AUTO: 77.5 FL — LOW (ref 80–100)
MONOCYTES # BLD AUTO: 1.5 K/UL — HIGH (ref 0–0.9)
MONOCYTES NFR BLD AUTO: 8.9 % — SIGNIFICANT CHANGE UP (ref 2–14)
NEUTROPHILS # BLD AUTO: 13.68 K/UL — HIGH (ref 1.8–7.4)
NEUTROPHILS NFR BLD AUTO: 81.1 % — HIGH (ref 43–77)
NITRITE UR-MCNC: NEGATIVE — SIGNIFICANT CHANGE UP
NRBC # BLD: 0 /100 WBCS — SIGNIFICANT CHANGE UP (ref 0–0)
PH UR: 5.5 — SIGNIFICANT CHANGE UP (ref 5–8)
PHOSPHATE SERPL-MCNC: 2.2 MG/DL — LOW (ref 2.5–4.5)
PLATELET # BLD AUTO: 223 K/UL — SIGNIFICANT CHANGE UP (ref 150–400)
POTASSIUM SERPL-MCNC: 4.1 MMOL/L — SIGNIFICANT CHANGE UP (ref 3.5–5.3)
POTASSIUM SERPL-SCNC: 4.1 MMOL/L — SIGNIFICANT CHANGE UP (ref 3.5–5.3)
PROT SERPL-MCNC: 6 G/DL — SIGNIFICANT CHANGE UP (ref 6–8.3)
PROT UR-MCNC: ABNORMAL
RBC # BLD: 4.66 M/UL — SIGNIFICANT CHANGE UP (ref 3.8–5.2)
RBC # FLD: 16.5 % — HIGH (ref 10.3–14.5)
SODIUM SERPL-SCNC: 137 MMOL/L — SIGNIFICANT CHANGE UP (ref 135–145)
SP GR SPEC: 1.03 — HIGH (ref 1.01–1.02)
SPECIMEN SOURCE: SIGNIFICANT CHANGE UP
T4 AB SER-ACNC: 5.9 UG/DL — SIGNIFICANT CHANGE UP (ref 4.6–12)
TSH SERPL-MCNC: 1.46 UIU/ML — SIGNIFICANT CHANGE UP (ref 0.27–4.2)
UROBILINOGEN FLD QL: NEGATIVE — SIGNIFICANT CHANGE UP
WBC # BLD: 16.86 K/UL — HIGH (ref 3.8–10.5)
WBC # FLD AUTO: 16.86 K/UL — HIGH (ref 3.8–10.5)

## 2021-11-30 PROCEDURE — 99223 1ST HOSP IP/OBS HIGH 75: CPT

## 2021-11-30 PROCEDURE — 99232 SBSQ HOSP IP/OBS MODERATE 35: CPT

## 2021-11-30 PROCEDURE — 99232 SBSQ HOSP IP/OBS MODERATE 35: CPT | Mod: GC

## 2021-11-30 RX ORDER — INSULIN LISPRO 100/ML
VIAL (ML) SUBCUTANEOUS
Refills: 0 | Status: DISCONTINUED | OUTPATIENT
Start: 2021-11-30 | End: 2021-12-06

## 2021-11-30 RX ORDER — APIXABAN 2.5 MG/1
5 TABLET, FILM COATED ORAL EVERY 12 HOURS
Refills: 0 | Status: DISCONTINUED | OUTPATIENT
Start: 2021-11-30 | End: 2021-12-06

## 2021-11-30 RX ORDER — SODIUM,POTASSIUM PHOSPHATES 278-250MG
1 POWDER IN PACKET (EA) ORAL ONCE
Refills: 0 | Status: COMPLETED | OUTPATIENT
Start: 2021-11-30 | End: 2021-11-30

## 2021-11-30 RX ORDER — INSULIN LISPRO 100/ML
VIAL (ML) SUBCUTANEOUS AT BEDTIME
Refills: 0 | Status: DISCONTINUED | OUTPATIENT
Start: 2021-11-30 | End: 2021-12-06

## 2021-11-30 RX ADMIN — ALBUTEROL 2 PUFF(S): 90 AEROSOL, METERED ORAL at 18:13

## 2021-11-30 RX ADMIN — Medication 60 MILLIGRAM(S): at 17:45

## 2021-11-30 RX ADMIN — BUDESONIDE AND FORMOTEROL FUMARATE DIHYDRATE 2 PUFF(S): 160; 4.5 AEROSOL RESPIRATORY (INHALATION) at 18:14

## 2021-11-30 RX ADMIN — CHLORHEXIDINE GLUCONATE 1 APPLICATION(S): 213 SOLUTION TOPICAL at 05:35

## 2021-11-30 RX ADMIN — CEFEPIME 100 MILLIGRAM(S): 1 INJECTION, POWDER, FOR SOLUTION INTRAMUSCULAR; INTRAVENOUS at 05:35

## 2021-11-30 RX ADMIN — Medication 1: at 09:16

## 2021-11-30 RX ADMIN — ALBUTEROL 2 PUFF(S): 90 AEROSOL, METERED ORAL at 23:08

## 2021-11-30 RX ADMIN — MONTELUKAST 10 MILLIGRAM(S): 4 TABLET, CHEWABLE ORAL at 12:57

## 2021-11-30 RX ADMIN — APIXABAN 5 MILLIGRAM(S): 2.5 TABLET, FILM COATED ORAL at 09:11

## 2021-11-30 RX ADMIN — MEXILETINE HYDROCHLORIDE 200 MILLIGRAM(S): 150 CAPSULE ORAL at 13:25

## 2021-11-30 RX ADMIN — POLYETHYLENE GLYCOL 3350 17 GRAM(S): 17 POWDER, FOR SOLUTION ORAL at 12:59

## 2021-11-30 RX ADMIN — Medication 60 MILLIGRAM(S): at 05:34

## 2021-11-30 RX ADMIN — Medication 1 PACKET(S): at 09:11

## 2021-11-30 RX ADMIN — BUDESONIDE AND FORMOTEROL FUMARATE DIHYDRATE 2 PUFF(S): 160; 4.5 AEROSOL RESPIRATORY (INHALATION) at 05:48

## 2021-11-30 RX ADMIN — Medication 3: at 12:58

## 2021-11-30 RX ADMIN — CEFEPIME 100 MILLIGRAM(S): 1 INJECTION, POWDER, FOR SOLUTION INTRAMUSCULAR; INTRAVENOUS at 13:25

## 2021-11-30 RX ADMIN — CEFEPIME 100 MILLIGRAM(S): 1 INJECTION, POWDER, FOR SOLUTION INTRAMUSCULAR; INTRAVENOUS at 21:37

## 2021-11-30 RX ADMIN — Medication 600 MILLIGRAM(S): at 17:32

## 2021-11-30 RX ADMIN — Medication 600 MILLIGRAM(S): at 05:34

## 2021-11-30 RX ADMIN — Medication 4 MILLIGRAM(S): at 05:34

## 2021-11-30 RX ADMIN — Medication 6: at 17:56

## 2021-11-30 RX ADMIN — ALBUTEROL 2 PUFF(S): 90 AEROSOL, METERED ORAL at 12:05

## 2021-11-30 RX ADMIN — MEXILETINE HYDROCHLORIDE 200 MILLIGRAM(S): 150 CAPSULE ORAL at 05:34

## 2021-11-30 RX ADMIN — PANTOPRAZOLE SODIUM 40 MILLIGRAM(S): 20 TABLET, DELAYED RELEASE ORAL at 05:34

## 2021-11-30 RX ADMIN — MEXILETINE HYDROCHLORIDE 200 MILLIGRAM(S): 150 CAPSULE ORAL at 21:37

## 2021-11-30 RX ADMIN — Medication 100 MILLIGRAM(S): at 19:55

## 2021-11-30 RX ADMIN — ALBUTEROL 2 PUFF(S): 90 AEROSOL, METERED ORAL at 05:48

## 2021-11-30 RX ADMIN — APIXABAN 5 MILLIGRAM(S): 2.5 TABLET, FILM COATED ORAL at 17:33

## 2021-11-30 RX ADMIN — TIOTROPIUM BROMIDE 1 CAPSULE(S): 18 CAPSULE ORAL; RESPIRATORY (INHALATION) at 12:46

## 2021-11-30 RX ADMIN — Medication 10 MILLIGRAM(S): at 15:52

## 2021-11-30 NOTE — PHYSICAL THERAPY INITIAL EVALUATION ADULT - PRECAUTIONS/LIMITATIONS, REHAB EVAL
+Chest CT 11/29/21: Consolidation in the lingula, compatible with pneumonia. Left lower lung opacity which may represent atelectasis versus pneumonia. Scattered tree-in-bud opacities consistent with impacted distal bronchioles. New 9 mm right lower lobe subpleural nodule, possibly an intrapulmonary lymph node./fall precautions

## 2021-11-30 NOTE — DISCHARGE NOTE PROVIDER - CARE PROVIDER_API CALL
Eulalio Allison (MD)  Internal Medicine; Pulmonary Disease  [address], [address]  [place] [address]  Phone: [phone]  Fax: [phone]  Follow Up Time:    Eulalio Allison)  Internal Medicine; Pulmonary Disease  [address], [address]  [place] [address]  Phone: [phone]  Fax: [phone]  Scheduled Appointment: 12/13/2021 12:00 PM    Bon Samuels)  Internal Medicine  [address], [address]  [place] [address]  Phone: [phone]  Fax: [phone]  Follow Up Time: 1 month    Demi Colorado)  Medicine  Practices at [address] [address]  Phone: [phone]  Fax: [phone]  Follow Up Time: 1 month

## 2021-11-30 NOTE — DISCHARGE NOTE PROVIDER - CARE PROVIDERS DIRECT ADDRESSES
,[email or web address]

## 2021-11-30 NOTE — CONSULT NOTE ADULT - SUBJECTIVE AND OBJECTIVE BOX
Patient is a 71y old  Female who presents with a chief complaint of chest discomfort (30 Nov 2021 09:22)    HPI:  71F with history of Tracheobronchomalasia s/p Tracheobronchoplasty, Bronchiectasis, Severe Allergic Asthma, Adrenal Insuffiencey, Port, DM2, HTN, Colorectal cancer s/p total colectomy now with colostomy, PAF on Eliquis p/w 1 day of chest discomfort associated with SOB. States yesterday evening she noticed gradual onset chest discomfort. She had to reposition herself to help with the sx. She called her MD who told her to go to the hospital. She reports the chest discomfort is 9/10, intermittent, no palliating or exacerbating factors, "tightness" in sensation, and associated with SOB and nausea. States had chronic cough,  has been making sputum. Denies any actual chest pain, abd pain, vomiting, changes in colostomy output. Denies any recent illness, rhinorrhea, HA, LE edema. Felt warm, but temp at home was 97. States has had chest discomfort in the past and at that time ended up being diagnosed with an infection (per chart review, hx of pseudomonas on bronchial cx in Feb 2020, sensitive to zosyn).    In the ED: T 99.3, HR 80, -170s, satting mid 90s on 2L nc. Given zofran, diltiazem 30 x1, eliquis 5mg x1, tylenol 650 x1, and reglan 10, and suraj 1g.   CXR w (29 Nov 2021 00:19)       REVIEW OF SYSTEMS  [  ] ROS unobtainable because:    [  ] All other systems negative except as noted below    Constitutional:  [ ] fever [ ] chills  [ ] weight loss  [ ]night sweat  [ ]poor appetite/PO intake [ ]fatigue   Skin:  [ ] rash [ ] phlebitis	  Eyes: [ ] icterus [ ] pain  [ ] discharge	  ENMT: [ ] sore throat  [ ] thrush [ ] ulcers [ ] exudates [ ]anosmia  Respiratory: [ ] dyspnea [ ] hemoptysis [ ] cough [ ] sputum	  Cardiovascular:  [ ] chest pain [ ] palpitations [ ] edema	  Gastrointestinal:  [ ] nausea [ ] vomiting [ ] diarrhea [ ] constipation [ ] pain	  Genitourinary:  [ ] dysuria [ ] frequency [ ] hematuria [ ] discharge [ ] flank pain  [ ] incontinence  Musculoskeletal:  [ ] myalgias [ ] arthralgias [ ] arthritis  [ ] back pain  Neurological:  [ ] headache [ ] weakness [ ] seizures  [ ] confusion/altered mental status    prior hospital charts reviewed [V]  primary team notes reviewed [V]  other consultant notes reviewed [V]    PAST MEDICAL & SURGICAL HISTORY:  Atrial fibrillation  paroxysmal, on eliquis    Diabetes  Type 2    COPD (chronic obstructive pulmonary disease)    Adrenal insufficiency  Medrol daily for over 50 years    Aortic insufficiency  moderate AR on echo 5/3/2018    Pelvic fracture    Asthma    Tracheobronchomalacia  diagnosed 2015, s/p bronchial thermoplasty 2016 (Dr Zapien); recent bronchoscopy 6/5/2018 revealed no evidence of tracheobronchomalacia in trachea or bronchial tubes    Colorectal cancer  4/2018- last treatment , chemo and radiation    Rectal bleeding    Seizure  x 1 1/7/18    DVT (deep venous thrombosis)  15-20 years ago, took coumadin    TIA (transient ischemic attack)  multiple, last 5 years ago - presents as right-sided weakness    History of partial hysterectomy  30 years ago - fibroids    H/O total knee replacement, bilateral  5 years ago    History of sinus surgery  multiple sinus surgeries    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    H/O pelvic surgery  5 years ago - s/p fracture    History of tracheomalacia  2015 - attempted tracheal stenting ([facility])- course complicated by obstruction, respiratory failure, multiple CPR attempts -  stent discontinued; 10/20/2016 Tracheobronchoplasty (Prolene Mesh) performed at [facility] by Dr Zapien    S/P bronchoscopy  6/5/2018 - [place] (Dr Zapien) no evidence of tracheobronchomalacia in trachea or bronchial tubes    Rectal bleeding  exam under anesthesia ([facility]) 2/2018        SOCIAL HISTORY:  - Denied smoking/vaping/alcohol/recreational drug use    FAMILY HISTORY:  Family history of asthma    Family history of breast cancer (Sibling)    Family history of diabetes mellitus type II        Allergies  ampicillin (Short breath)  aspirin (Short breath)  Avelox (Short breath; Pruritus)  codeine (Short breath)  Dilaudid (Short breath)  iodine (Short breath; Swelling)  penicillin (Short breath)  shellfish (Anaphylaxis)  tetanus toxoid (Short breath)  Valium (Short breath)        ANTIMICROBIALS:  cefepime   IVPB 1000 every 8 hours  cefepime   IVPB        ANTIMICROBIALS (past 90 days):  MEDICATIONS  (STANDING):  cefepime   IVPB   100 mL/Hr IV Intermittent (11-29-21 @ 03:56)    cefepime   IVPB   100 mL/Hr IV Intermittent (11-30-21 @ 05:35)   100 mL/Hr IV Intermittent (11-29-21 @ 23:03)   100 mL/Hr IV Intermittent (11-29-21 @ 14:39)    meropenem  IVPB   100 mL/Hr IV Intermittent (11-28-21 @ 17:07)        OTHER MEDS:   MEDICATIONS  (STANDING):  acetaminophen     Tablet .. 650 every 6 hours PRN  ALBUTerol    90 MICROgram(s) HFA Inhaler 2 every 6 hours  aluminum hydroxide/magnesium hydroxide/simethicone Suspension 30 every 4 hours PRN  apixaban 5 every 12 hours  budesonide 160 MICROgram(s)/formoterol 4.5 MICROgram(s) Inhaler 2 two times a day  dextrose 40% Gel 15 once  dextrose 50% Injectable 25 once  dextrose 50% Injectable 12.5 once  dextrose 50% Injectable 25 once  diltiazem    Tablet 60 every 12 hours  glucagon  Injectable 1 once  guaiFENesin  every 12 hours  insulin lispro (ADMELOG) corrective regimen sliding scale  three times a day before meals  insulin lispro (ADMELOG) corrective regimen sliding scale  at bedtime  melatonin 3 at bedtime PRN  methylPREDNISolone 4 daily  metoclopramide Injectable 10 every 8 hours PRN  mexiletine 200 three times a day  montelukast 10 daily  pantoprazole    Tablet 40 before breakfast  polyethylene glycol 3350 17 daily  tiotropium 18 MICROgram(s) Capsule 1 daily      VITALS:  Vital Signs Last 24 Hrs  T(F): 99.4 (11-30-21 @ 04:55), Max: 100.5 (11-29-21 @ 04:36)    Vital Signs Last 24 Hrs  HR: 83 (11-30-21 @ 10:51) (69 - 83)  BP: 109/67 (11-30-21 @ 10:51) (109/67 - 115/67)  RR: 18 (11-30-21 @ 04:55)  SpO2: 92% (11-30-21 @ 10:51) (92% - 96%)  Wt(kg): --    EXAM:  Physical Exam:  Constitutional:  well preserved, comfortable  Head/Eyes: no icterus, PERRL, EOMI  ENT:  supple; no thrush  LUNGS:  CTA  CVS:  normal S1, S2, no murmur  Abd:  soft, non-tender; non-distended  Ext:  no edema  Vascular:  IV site no erythema tenderness or discharge  MSK:  joints without swelling  Neuro: AAO X 3, non- focal    Labs:                        11.2   16.86 )-----------( 223      ( 30 Nov 2021 07:06 )             36.1     11-30    137  |  102  |  9   ----------------------------<  151<H>  4.1   |  25  |  0.70    Ca    8.5      30 Nov 2021 07:04  Phos  2.2     11-30  Mg     1.9     11-30    TPro  6.0  /  Alb  3.3  /  TBili  0.3  /  DBili  x   /  AST  11  /  ALT  10  /  AlkPhos  100  11-30      WBC Trend:  WBC Count: 16.86 (11-30-21 @ 07:06)  WBC Count: 18.05 (11-29-21 @ 06:04)  WBC Count: 12.24 (11-28-21 @ 16:31)      Auto Neutrophil #: 13.68 K/uL (11-30-21 @ 07:06)  Auto Neutrophil #: 9.07 K/uL (11-28-21 @ 16:31)  Auto Neutrophil #: 7.00 K/uL (11-16-21 @ 15:01)  Auto Neutrophil #: 8.26 K/uL (10-14-21 @ 12:53)  Auto Neutrophil #: 4.61 K/uL (10-04-21 @ 11:04)      Creatine Trend:  Creatinine, Serum: 0.70 (11-30)  Creatinine, Serum: 0.81 (11-29)  Creatinine, Serum: 0.73 (11-28)      Liver Biochemical Testing Trend:  Alanine Aminotransferase (ALT/SGPT): 10 (11-30)  Alanine Aminotransferase (ALT/SGPT): 12 (11-29)  Alanine Aminotransferase (ALT/SGPT): 15 (11-28)  Alanine Aminotransferase (ALT/SGPT): 17 (10-14)  Alanine Aminotransferase (ALT/SGPT): 19 (06-02)  Aspartate Aminotransferase (AST/SGOT): 11 (11-30-21 @ 07:04)  Aspartate Aminotransferase (AST/SGOT): 13 (11-29-21 @ 06:04)  Aspartate Aminotransferase (AST/SGOT): 15 (11-28-21 @ 16:31)  Aspartate Aminotransferase (AST/SGOT): 19 (10-14-21 @ 12:53)  Aspartate Aminotransferase (AST/SGOT): 20 (06-02-21 @ 07:54)  Bilirubin Total, Serum: 0.3 (11-30)  Bilirubin Total, Serum: 0.3 (11-29)  Bilirubin Total, Serum: 0.3 (11-28)  Bilirubin Total, Serum: 0.2 (10-14)  Bilirubin Total, Serum: 0.2 (06-02)      Trend LDH      Auto Eosinophil %: 0.6 % (11-30-21 @ 07:06)  Auto Eosinophil %: 0.5 % (11-28-21 @ 16:31)          MICROBIOLOGY:        Culture - Sputum (collected 29 Nov 2021 22:10)  Source: .Sputum Sputum    Culture - Blood (collected 28 Nov 2021 22:33)  Source: .Blood Blood-Peripheral  Preliminary Report:    No growth to date.    Culture - Blood (collected 28 Nov 2021 22:33)  Source: .Blood Blood-Peripheral  Preliminary Report:    No growth to date.    Culture - Bronchial (collected 22 Oct 2021 19:14)  Source: .Bronchial Right Bronchus  Final Report:    Few Normal Respiratory Val present    Culture - Bronchial (collected 14 Feb 2020 20:12)  Source: .Bronchial bronchoscopy sputum  Final Report:    Moderate Pseudomonas aeruginosa    No routine respiratory val Isolated  Organism: Pseudomonas aeruginosa  Pseudomonas aeruginosa  Organism: Pseudomonas aeruginosa    Sensitivities:      -  Tobramycin: S      Method Type: KB  Organism: Pseudomonas aeruginosa    Sensitivities:      -  Aztreonam: S <=4      -  Cefepime: S 8      -  Ciprofloxacin: R 2      -  Gentamicin: R >8      -  Piperacillin/Tazobactam: S <=8      Method Type: GARRETT    Culture - Sputum (collected 08 Dec 2019 00:51)  Source: .Sputum Sputum  Final Report:    Normal Respiratory Val present    Culture - Urine (collected 07 Dec 2019 10:07)  Source: .Urine Clean Catch (Midstream)  Final Report:    <10,000 CFU/mL Normal Urogenital Val    Culture - Blood (collected 07 Dec 2019 10:01)  Source: .Blood Blood-Peripheral  Final Report:    No growth at 5 days.    Culture - Blood (collected 07 Dec 2019 06:34)  Source: .Blood Blood-Peripheral  Final Report:    No growth at 5 days.        ABS CD4: 886 /uL (02-07-17 @ 11:39)                  Rapid RVP Result: NotDetec (11-28 @ 16:31)      COVID-19 Lex Domain AB Interp: Positive (11-29-21 @ 08:58)  COVID-19 Nucleocapsid DYLAN AB Interp: Negative (11-29-21 @ 08:58)  COVID-19 Lex Domain AB Interp: Positive (06-03-21 @ 10:32)      Procalcitonin, Serum: 0.44 (11-29)            Serum Pro-Brain Natriuretic Peptide: 322 (11-28)    Troponin T, High Sensitivity Result: 31 (11-29)  Troponin T, High Sensitivity Result: 20 (11-28)  Troponin T, High Sensitivity Result: 18 (11-28)    Blood Gas Venous - Lactate: 2.0 (11-28 @ 16:31)    A1C with Estimated Average Glucose Result: 7.8 % (11-30-21 @ 09:05)      RADIOLOGY:  imaging below personally reviewed   Patient is a 71y old  Female who presents with a chief complaint of chest discomfort (30 Nov 2021 09:22)    HPI:  71F with history of Tracheobronchomalasia s/p Tracheobronchoplasty, Bronchiectasis, Severe Allergic Asthma, Adrenal Insuffiencey on chronic steroids,DM2, HTN, Colorectal cancer s/p total colectomy now with colostomy, PAF on Eliquis p/w 1 day of chest discomfort associated with SOB. Follows w/ infectious disease Dr. Stout outpatient, has been treated multiple times for bronchiectasis exacerbations. Sputum cx grew Pseudomonas in 3/2021, was treated w/ Aztreonam. Sputum cx grew Klebsiella, Actinobacter, and Chryseobacter in 9/2021, was treated w/ levaquin and doxyxycline. Bronchoscopy cx from 10/2021 grew normal respiratory val. Acid fast staining was negative in 2019. Patient now presents w/ SOB, chest tightness, and productive cough x4-5 days. Found to have fever and leukocytosis, CT showing consolidation of lingula, LLL opacity atelectasis vs PNA, impacted distal bronchioles, and 9mm RLL nodule possibly a LN. Admitted w/ PNA, being treated w/ IV cefepime. ID consulted for antibiotic recommendations.       REVIEW OF SYSTEMS  [  ] ROS unobtainable because:    [ x ] All other systems negative except as noted below    Constitutional:  [ x] fever [x ] chills  [ ] weight loss  [ ]night sweat  [ ]poor appetite/PO intake [ ]fatigue   Skin:  [ ] rash [ ] phlebitis	  Eyes: [ ] icterus [ ] pain  [ ] discharge	  ENMT: [ ] sore throat  [ ] thrush [ ] ulcers [ ] exudates [ ]anosmia  Respiratory: [x ] dyspnea [ ] hemoptysis [x ] cough [ x] sputum	  Cardiovascular:  [ ] chest pain [ ] palpitations [ ] edema	  Gastrointestinal:  [ ] nausea [ ] vomiting [ ] diarrhea [ ] constipation [ ] pain	  Genitourinary:  [ ] dysuria [ ] frequency [ ] hematuria [ ] discharge [ ] flank pain  [ ] incontinence  Musculoskeletal:  [ ] myalgias [ ] arthralgias [ ] arthritis  [ ] back pain  Neurological:  [ ] headache [ ] weakness [ ] seizures  [ ] confusion/altered mental status    prior hospital charts reviewed [V]  primary team notes reviewed [V]  other consultant notes reviewed [V]    PAST MEDICAL & SURGICAL HISTORY:  Atrial fibrillation  paroxysmal, on eliquis    Diabetes  Type 2    COPD (chronic obstructive pulmonary disease)    Adrenal insufficiency  Medrol daily for over 50 years    Aortic insufficiency  moderate AR on echo 5/3/2018    Pelvic fracture    Asthma    Tracheobronchomalacia  diagnosed 2015, s/p bronchial thermoplasty 2016 (Dr Zapien); recent bronchoscopy 6/5/2018 revealed no evidence of tracheobronchomalacia in trachea or bronchial tubes    Colorectal cancer  4/2018- last treatment , chemo and radiation    Rectal bleeding    Seizure  x 1 1/7/18    DVT (deep venous thrombosis)  15-20 years ago, took coumadin    TIA (transient ischemic attack)  multiple, last 5 years ago - presents as right-sided weakness    History of partial hysterectomy  30 years ago - fibroids    H/O total knee replacement, bilateral  5 years ago    History of sinus surgery  multiple sinus surgeries    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    H/O pelvic surgery  5 years ago - s/p fracture    History of tracheomalacia  2015 - attempted tracheal stenting ([facility])- course complicated by obstruction, respiratory failure, multiple CPR attempts -  stent discontinued; 10/20/2016 Tracheobronchoplasty (Prolene Mesh) performed at [facility] by Dr Zapien    S/P bronchoscopy  6/5/2018 - [place] (Dr Zapien) no evidence of tracheobronchomalacia in trachea or bronchial tubes    Rectal bleeding  exam under anesthesia ([facility]) 2/2018        SOCIAL HISTORY:  - Denied smoking/vaping/alcohol/recreational drug use    FAMILY HISTORY:  Family history of asthma    Family history of breast cancer (Sibling)    Family history of diabetes mellitus type II        Allergies  ampicillin (Short breath)  aspirin (Short breath)  Avelox (Short breath; Pruritus)  codeine (Short breath)  Dilaudid (Short breath)  iodine (Short breath; Swelling)  penicillin (Short breath)  shellfish (Anaphylaxis)  tetanus toxoid (Short breath)  Valium (Short breath)        ANTIMICROBIALS:  cefepime   IVPB 1000 every 8 hours  cefepime   IVPB      ANTIMICROBIALS (past 90 days):  MEDICATIONS  (STANDING):  cefepime   IVPB   100 mL/Hr IV Intermittent (11-29-21 @ 03:56)    cefepime   IVPB   100 mL/Hr IV Intermittent (11-30-21 @ 05:35)   100 mL/Hr IV Intermittent (11-29-21 @ 23:03)   100 mL/Hr IV Intermittent (11-29-21 @ 14:39)    meropenem  IVPB   100 mL/Hr IV Intermittent (11-28-21 @ 17:07)      OTHER MEDS:   MEDICATIONS  (STANDING):  acetaminophen     Tablet .. 650 every 6 hours PRN  ALBUTerol    90 MICROgram(s) HFA Inhaler 2 every 6 hours  aluminum hydroxide/magnesium hydroxide/simethicone Suspension 30 every 4 hours PRN  apixaban 5 every 12 hours  budesonide 160 MICROgram(s)/formoterol 4.5 MICROgram(s) Inhaler 2 two times a day  dextrose 40% Gel 15 once  dextrose 50% Injectable 25 once  dextrose 50% Injectable 12.5 once  dextrose 50% Injectable 25 once  diltiazem    Tablet 60 every 12 hours  glucagon  Injectable 1 once  guaiFENesin  every 12 hours  insulin lispro (ADMELOG) corrective regimen sliding scale  three times a day before meals  insulin lispro (ADMELOG) corrective regimen sliding scale  at bedtime  melatonin 3 at bedtime PRN  methylPREDNISolone 4 daily  metoclopramide Injectable 10 every 8 hours PRN  mexiletine 200 three times a day  montelukast 10 daily  pantoprazole    Tablet 40 before breakfast  polyethylene glycol 3350 17 daily  tiotropium 18 MICROgram(s) Capsule 1 daily      VITALS:  Vital Signs Last 24 Hrs  T(F): 99.4 (11-30-21 @ 04:55), Max: 100.5 (11-29-21 @ 04:36)    Vital Signs Last 24 Hrs  HR: 83 (11-30-21 @ 10:51) (69 - 83)  BP: 109/67 (11-30-21 @ 10:51) (109/67 - 115/67)  RR: 18 (11-30-21 @ 04:55)  SpO2: 92% (11-30-21 @ 10:51) (92% - 96%)  Wt(kg): --    EXAM:  Physical Exam:  Constitutional:  well preserved, comfortable  Head/Eyes: no icterus, PERRL, EOMI  ENT:  supple; no thrush  LUNGS:  CTA  CVS:  normal S1, S2, no murmur  Abd:  soft, non-tender; non-distended. colostomy site C/D/I  Ext:  no edema  Vascular:  IV site no erythema tenderness or discharge  MSK:  joints without swelling  Neuro: AAO X 3, non- focal    Labs:                        11.2   16.86 )-----------( 223      ( 30 Nov 2021 07:06 )             36.1     11-30    137  |  102  |  9   ----------------------------<  151<H>  4.1   |  25  |  0.70    Ca    8.5      30 Nov 2021 07:04  Phos  2.2     11-30  Mg     1.9     11-30    TPro  6.0  /  Alb  3.3  /  TBili  0.3  /  DBili  x   /  AST  11  /  ALT  10  /  AlkPhos  100  11-30      WBC Trend:  WBC Count: 16.86 (11-30-21 @ 07:06)  WBC Count: 18.05 (11-29-21 @ 06:04)  WBC Count: 12.24 (11-28-21 @ 16:31)      Auto Neutrophil #: 13.68 K/uL (11-30-21 @ 07:06)  Auto Neutrophil #: 9.07 K/uL (11-28-21 @ 16:31)  Auto Neutrophil #: 7.00 K/uL (11-16-21 @ 15:01)  Auto Neutrophil #: 8.26 K/uL (10-14-21 @ 12:53)  Auto Neutrophil #: 4.61 K/uL (10-04-21 @ 11:04)      Creatine Trend:  Creatinine, Serum: 0.70 (11-30)  Creatinine, Serum: 0.81 (11-29)  Creatinine, Serum: 0.73 (11-28)      Liver Biochemical Testing Trend:  Alanine Aminotransferase (ALT/SGPT): 10 (11-30)  Alanine Aminotransferase (ALT/SGPT): 12 (11-29)  Alanine Aminotransferase (ALT/SGPT): 15 (11-28)  Alanine Aminotransferase (ALT/SGPT): 17 (10-14)  Alanine Aminotransferase (ALT/SGPT): 19 (06-02)  Aspartate Aminotransferase (AST/SGOT): 11 (11-30-21 @ 07:04)  Aspartate Aminotransferase (AST/SGOT): 13 (11-29-21 @ 06:04)  Aspartate Aminotransferase (AST/SGOT): 15 (11-28-21 @ 16:31)  Aspartate Aminotransferase (AST/SGOT): 19 (10-14-21 @ 12:53)  Aspartate Aminotransferase (AST/SGOT): 20 (06-02-21 @ 07:54)  Bilirubin Total, Serum: 0.3 (11-30)  Bilirubin Total, Serum: 0.3 (11-29)  Bilirubin Total, Serum: 0.3 (11-28)  Bilirubin Total, Serum: 0.2 (10-14)  Bilirubin Total, Serum: 0.2 (06-02)      Trend LDH      Auto Eosinophil %: 0.6 % (11-30-21 @ 07:06)  Auto Eosinophil %: 0.5 % (11-28-21 @ 16:31)          MICROBIOLOGY:        Culture - Sputum (collected 29 Nov 2021 22:10)  Source: .Sputum Sputum    Culture - Blood (collected 28 Nov 2021 22:33)  Source: .Blood Blood-Peripheral  Preliminary Report:    No growth to date.    Culture - Blood (collected 28 Nov 2021 22:33)  Source: .Blood Blood-Peripheral  Preliminary Report:    No growth to date.    Culture - Bronchial (collected 22 Oct 2021 19:14)  Source: .Bronchial Right Bronchus  Final Report:    Few Normal Respiratory Val present    Culture - Bronchial (collected 14 Feb 2020 20:12)  Source: .Bronchial bronchoscopy sputum  Final Report:    Moderate Pseudomonas aeruginosa    No routine respiratory val Isolated  Organism: Pseudomonas aeruginosa  Pseudomonas aeruginosa  Organism: Pseudomonas aeruginosa    Sensitivities:      -  Tobramycin: S      Method Type: KB  Organism: Pseudomonas aeruginosa    Sensitivities:      -  Aztreonam: S <=4      -  Cefepime: S 8      -  Ciprofloxacin: R 2      -  Gentamicin: R >8      -  Piperacillin/Tazobactam: S <=8      Method Type: GARRETT    Culture - Sputum (collected 08 Dec 2019 00:51)  Source: .Sputum Sputum  Final Report:    Normal Respiratory Val present    Culture - Urine (collected 07 Dec 2019 10:07)  Source: .Urine Clean Catch (Midstream)  Final Report:    <10,000 CFU/mL Normal Urogenital Val    Culture - Blood (collected 07 Dec 2019 10:01)  Source: .Blood Blood-Peripheral  Final Report:    No growth at 5 days.    Culture - Blood (collected 07 Dec 2019 06:34)  Source: .Blood Blood-Peripheral  Final Report:    No growth at 5 days.        ABS CD4: 886 /uL (02-07-17 @ 11:39)                  Rapid RVP Result: NotDetec (11-28 @ 16:31)      COVID-19 Lex Domain AB Interp: Positive (11-29-21 @ 08:58)  COVID-19 Nucleocapsid DYLAN AB Interp: Negative (11-29-21 @ 08:58)  COVID-19 Lex Domain AB Interp: Positive (06-03-21 @ 10:32)      Procalcitonin, Serum: 0.44 (11-29)            Serum Pro-Brain Natriuretic Peptide: 322 (11-28)    Troponin T, High Sensitivity Result: 31 (11-29)  Troponin T, High Sensitivity Result: 20 (11-28)  Troponin T, High Sensitivity Result: 18 (11-28)    Blood Gas Venous - Lactate: 2.0 (11-28 @ 16:31)    A1C with Estimated Average Glucose Result: 7.8 % (11-30-21 @ 09:05)      RADIOLOGY:  imaging below personally reviewed   Patient is a 71y old  Female who presents with a chief complaint of chest discomfort (30 Nov 2021 09:22)    HPI:  71F with history of Tracheobronchomalasia s/p Tracheobronchoplasty, Bronchiectasis, Severe Allergic Asthma, Adrenal Insuffiencey on chronic steroids,DM2, HTN, Colorectal cancer s/p total colectomy now with colostomy, PAF on Eliquis p/w 1 day of chest discomfort associated with SOB. Follows w/ infectious disease Dr. Stout outpatient, has been treated multiple times for bronchiectasis exacerbations. Sputum cx grew Pseudomonas in 3/2021, was treated w/ Aztreonam. Sputum cx grew Klebsiella, Actinobacter, and Chryseobacter in 9/2021, was treated w/ levaquin and doxyxycline. Bronchoscopy cx from 10/2021 grew normal respiratory val. Acid fast staining was negative in 2019. Patient now presents w/ SOB, chest tightness, and productive cough x4-5 days. Found to have fever and leukocytosis, CT showing consolidation of lingula, LLL opacity atelectasis vs PNA, impacted distal bronchioles, and 9mm RLL nodule possibly a LN. Admitted w/ PNA, being treated w/ IV cefepime. ID consulted for antibiotic recommendations.       REVIEW OF SYSTEMS  [  ] ROS unobtainable because:    [ x ] All other systems negative except as noted below    Constitutional:  [ x] fever [x ] chills  [ ] weight loss  [ ]night sweat  [ ]poor appetite/PO intake [ ]fatigue   Skin:  [ ] rash [ ] phlebitis	  Eyes: [ ] icterus [ ] pain  [ ] discharge	  ENMT: [ ] sore throat  [ ] thrush [ ] ulcers [ ] exudates [ ]anosmia  Respiratory: [x ] dyspnea [ ] hemoptysis [x ] cough [ x] sputum	  Cardiovascular:  [ ] chest pain [ ] palpitations [ ] edema	  Gastrointestinal:  [ ] nausea [ ] vomiting [ ] diarrhea [ ] constipation [ ] pain	  Genitourinary:  [ ] dysuria [ ] frequency [ ] hematuria [ ] discharge [ ] flank pain  [ ] incontinence  Musculoskeletal:  [ ] myalgias [ ] arthralgias [ ] arthritis  [ ] back pain  Neurological:  [ ] headache [ ] weakness [ ] seizures  [ ] confusion/altered mental status    prior hospital charts reviewed [V]  primary team notes reviewed [V]  other consultant notes reviewed [V]    PAST MEDICAL & SURGICAL HISTORY:  Atrial fibrillation  paroxysmal, on eliquis    Diabetes  Type 2    COPD (chronic obstructive pulmonary disease)    Adrenal insufficiency  Medrol daily for over 50 years    Aortic insufficiency  moderate AR on echo 5/3/2018    Pelvic fracture    Asthma    Tracheobronchomalacia  diagnosed 2015, s/p bronchial thermoplasty 2016 (Dr Zapien); recent bronchoscopy 6/5/2018 revealed no evidence of tracheobronchomalacia in trachea or bronchial tubes    Colorectal cancer  4/2018- last treatment , chemo and radiation    Rectal bleeding    Seizure  x 1 1/7/18    DVT (deep venous thrombosis)  15-20 years ago, took coumadin    TIA (transient ischemic attack)  multiple, last 5 years ago - presents as right-sided weakness    History of partial hysterectomy  30 years ago - fibroids    H/O total knee replacement, bilateral  5 years ago    History of sinus surgery  multiple sinus surgeries    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    H/O pelvic surgery  5 years ago - s/p fracture    History of tracheomalacia  2015 - attempted tracheal stenting ([facility])- course complicated by obstruction, respiratory failure, multiple CPR attempts -  stent discontinued; 10/20/2016 Tracheobronchoplasty (Prolene Mesh) performed at [facility] by Dr Zapien    S/P bronchoscopy  6/5/2018 - [facility] (Dr Zapien) no evidence of tracheobronchomalacia in trachea or bronchial tubes    Rectal bleeding  exam under anesthesia ([facility]) 2/2018        SOCIAL HISTORY:  - Denied smoking/vaping/alcohol/recreational drug use  - Used to work as an  w/ special needs clients    FAMILY HISTORY:  Family history of asthma  Family history of breast cancer (Sibling)  Family history of diabetes mellitus type II      Allergies  ampicillin (Short breath)  aspirin (Short breath)  Avelox (Short breath; Pruritus)  codeine (Short breath)  Dilaudid (Short breath)  iodine (Short breath; Swelling)  penicillin (Short breath)  shellfish (Anaphylaxis)  tetanus toxoid (Short breath)  Valium (Short breath)        ANTIMICROBIALS:  cefepime   IVPB 1000 every 8 hours  cefepime   IVPB      ANTIMICROBIALS (past 90 days):  MEDICATIONS  (STANDING):  cefepime   IVPB   100 mL/Hr IV Intermittent (11-29-21 @ 03:56)    cefepime   IVPB   100 mL/Hr IV Intermittent (11-30-21 @ 05:35)   100 mL/Hr IV Intermittent (11-29-21 @ 23:03)   100 mL/Hr IV Intermittent (11-29-21 @ 14:39)    meropenem  IVPB   100 mL/Hr IV Intermittent (11-28-21 @ 17:07)      OTHER MEDS:   MEDICATIONS  (STANDING):  acetaminophen     Tablet .. 650 every 6 hours PRN  ALBUTerol    90 MICROgram(s) HFA Inhaler 2 every 6 hours  aluminum hydroxide/magnesium hydroxide/simethicone Suspension 30 every 4 hours PRN  apixaban 5 every 12 hours  budesonide 160 MICROgram(s)/formoterol 4.5 MICROgram(s) Inhaler 2 two times a day  dextrose 40% Gel 15 once  dextrose 50% Injectable 25 once  dextrose 50% Injectable 12.5 once  dextrose 50% Injectable 25 once  diltiazem    Tablet 60 every 12 hours  glucagon  Injectable 1 once  guaiFENesin  every 12 hours  insulin lispro (ADMELOG) corrective regimen sliding scale  three times a day before meals  insulin lispro (ADMELOG) corrective regimen sliding scale  at bedtime  melatonin 3 at bedtime PRN  methylPREDNISolone 4 daily  metoclopramide Injectable 10 every 8 hours PRN  mexiletine 200 three times a day  montelukast 10 daily  pantoprazole    Tablet 40 before breakfast  polyethylene glycol 3350 17 daily  tiotropium 18 MICROgram(s) Capsule 1 daily      VITALS:  Vital Signs Last 24 Hrs  T(F): 99.4 (11-30-21 @ 04:55), Max: 100.5 (11-29-21 @ 04:36)    Vital Signs Last 24 Hrs  HR: 83 (11-30-21 @ 10:51) (69 - 83)  BP: 109/67 (11-30-21 @ 10:51) (109/67 - 115/67)  RR: 18 (11-30-21 @ 04:55)  SpO2: 92% (11-30-21 @ 10:51) (92% - 96%)  Wt(kg): --    EXAM:  Physical Exam:  Constitutional:  well preserved, comfortable  Head/Eyes: no icterus, PERRL, EOMI  ENT:  supple; no thrush  LUNGS:  CTA  CVS:  normal S1, S2, no murmur  Abd:  soft, non-tender; non-distended. colostomy site C/D/I  Ext:  no edema  Vascular:  IV site no erythema tenderness or discharge  MSK:  joints without swelling  Neuro: AAO X 3, non- focal    Labs:                        11.2   16.86 )-----------( 223      ( 30 Nov 2021 07:06 )             36.1     11-30    137  |  102  |  9   ----------------------------<  151<H>  4.1   |  25  |  0.70    Ca    8.5      30 Nov 2021 07:04  Phos  2.2     11-30  Mg     1.9     11-30    TPro  6.0  /  Alb  3.3  /  TBili  0.3  /  DBili  x   /  AST  11  /  ALT  10  /  AlkPhos  100  11-30      WBC Trend:  WBC Count: 16.86 (11-30-21 @ 07:06)  WBC Count: 18.05 (11-29-21 @ 06:04)  WBC Count: 12.24 (11-28-21 @ 16:31)      Auto Neutrophil #: 13.68 K/uL (11-30-21 @ 07:06)  Auto Neutrophil #: 9.07 K/uL (11-28-21 @ 16:31)  Auto Neutrophil #: 7.00 K/uL (11-16-21 @ 15:01)  Auto Neutrophil #: 8.26 K/uL (10-14-21 @ 12:53)  Auto Neutrophil #: 4.61 K/uL (10-04-21 @ 11:04)      Creatine Trend:  Creatinine, Serum: 0.70 (11-30)  Creatinine, Serum: 0.81 (11-29)  Creatinine, Serum: 0.73 (11-28)      Liver Biochemical Testing Trend:  Alanine Aminotransferase (ALT/SGPT): 10 (11-30)  Alanine Aminotransferase (ALT/SGPT): 12 (11-29)  Alanine Aminotransferase (ALT/SGPT): 15 (11-28)  Alanine Aminotransferase (ALT/SGPT): 17 (10-14)  Alanine Aminotransferase (ALT/SGPT): 19 (06-02)  Aspartate Aminotransferase (AST/SGOT): 11 (11-30-21 @ 07:04)  Aspartate Aminotransferase (AST/SGOT): 13 (11-29-21 @ 06:04)  Aspartate Aminotransferase (AST/SGOT): 15 (11-28-21 @ 16:31)  Aspartate Aminotransferase (AST/SGOT): 19 (10-14-21 @ 12:53)  Aspartate Aminotransferase (AST/SGOT): 20 (06-02-21 @ 07:54)  Bilirubin Total, Serum: 0.3 (11-30)  Bilirubin Total, Serum: 0.3 (11-29)  Bilirubin Total, Serum: 0.3 (11-28)  Bilirubin Total, Serum: 0.2 (10-14)  Bilirubin Total, Serum: 0.2 (06-02)      Trend LDH      Auto Eosinophil %: 0.6 % (11-30-21 @ 07:06)  Auto Eosinophil %: 0.5 % (11-28-21 @ 16:31)          MICROBIOLOGY:        Culture - Sputum (collected 29 Nov 2021 22:10)  Source: .Sputum Sputum    Culture - Blood (collected 28 Nov 2021 22:33)  Source: .Blood Blood-Peripheral  Preliminary Report:    No growth to date.    Culture - Blood (collected 28 Nov 2021 22:33)  Source: .Blood Blood-Peripheral  Preliminary Report:    No growth to date.    Culture - Bronchial (collected 22 Oct 2021 19:14)  Source: .Bronchial Right Bronchus  Final Report:    Few Normal Respiratory Val present    Culture - Bronchial (collected 14 Feb 2020 20:12)  Source: .Bronchial bronchoscopy sputum  Final Report:    Moderate Pseudomonas aeruginosa    No routine respiratory val Isolated  Organism: Pseudomonas aeruginosa  Pseudomonas aeruginosa  Organism: Pseudomonas aeruginosa    Sensitivities:      -  Tobramycin: S      Method Type: KB  Organism: Pseudomonas aeruginosa    Sensitivities:      -  Aztreonam: S <=4      -  Cefepime: S 8      -  Ciprofloxacin: R 2      -  Gentamicin: R >8      -  Piperacillin/Tazobactam: S <=8      Method Type: GARRETT    Culture - Sputum (collected 08 Dec 2019 00:51)  Source: .Sputum Sputum  Final Report:    Normal Respiratory Val present    Culture - Urine (collected 07 Dec 2019 10:07)  Source: .Urine Clean Catch (Midstream)  Final Report:    <10,000 CFU/mL Normal Urogenital Val    Culture - Blood (collected 07 Dec 2019 10:01)  Source: .Blood Blood-Peripheral  Final Report:    No growth at 5 days.    Culture - Blood (collected 07 Dec 2019 06:34)  Source: .Blood Blood-Peripheral  Final Report:    No growth at 5 days.        ABS CD4: 886 /uL (02-07-17 @ 11:39)                  Rapid RVP Result: NotDetec (11-28 @ 16:31)      COVID-19 Lex Domain AB Interp: Positive (11-29-21 @ 08:58)  COVID-19 Nucleocapsid DYLAN AB Interp: Negative (11-29-21 @ 08:58)  COVID-19 Lex Domain AB Interp: Positive (06-03-21 @ 10:32)      Procalcitonin, Serum: 0.44 (11-29)            Serum Pro-Brain Natriuretic Peptide: 322 (11-28)    Troponin T, High Sensitivity Result: 31 (11-29)  Troponin T, High Sensitivity Result: 20 (11-28)  Troponin T, High Sensitivity Result: 18 (11-28)    Blood Gas Venous - Lactate: 2.0 (11-28 @ 16:31)    A1C with Estimated Average Glucose Result: 7.8 % (11-30-21 @ 09:05)      RADIOLOGY:  imaging below personally reviewed   Patient is a 71y old  Female who presents with a chief complaint of chest discomfort (30 Nov 2021 09:22)    HPI:  71F with history of Tracheobronchomalasia s/p Tracheobronchoplasty, Bronchiectasis, Severe Allergic Asthma, Adrenal Insuffiencey on chronic steroids,DM2, HTN, Colorectal cancer s/p total colectomy now with colostomy, PAF on Eliquis p/w 1 day of chest discomfort associated with SOB. Follows w/ infectious disease Dr. Stout outpatient, has been treated multiple times for bronchiectasis exacerbations. Sputum cx grew Pseudomonas in 3/2021, was treated w/ Aztreonam. Sputum cx grew Klebsiella, Actinobacter, and Chryseobacter in 9/2021, was treated w/ levaquin and doxyxycline. Bronchoscopy cx from 10/2021 grew normal respiratory val. Acid fast staining was negative in 2019. Patient now presents w/ SOB, chest tightness, and productive cough x4-5 days. Found to have fever and leukocytosis, CT showing consolidation of lingula, LLL opacity atelectasis vs PNA, impacted distal bronchioles, and 9mm RLL nodule possibly a LN. Admitted w/ PNA, being treated w/ IV cefepime. ID consulted for antibiotic recommendations.       REVIEW OF SYSTEMS  [  ] ROS unobtainable because:    [ x ] All other systems negative except as noted below    Constitutional:  [ x] fever [x ] chills  [ ] weight loss  [ ]night sweat  [ ]poor appetite/PO intake [ ]fatigue   Skin:  [ ] rash [ ] phlebitis	  Eyes: [ ] icterus [ ] pain  [ ] discharge	  ENMT: [ ] sore throat  [ ] thrush [ ] ulcers [ ] exudates [ ]anosmia  Respiratory: [x ] dyspnea [ ] hemoptysis [x ] cough [ x] sputum	  Cardiovascular:  [ ] chest pain [ ] palpitations [ ] edema	  Gastrointestinal:  [ ] nausea [ ] vomiting [ ] diarrhea [ ] constipation [ ] pain	  Genitourinary:  [ ] dysuria [ ] frequency [ ] hematuria [ ] discharge [ ] flank pain  [ ] incontinence  Musculoskeletal:  [ ] myalgias [ ] arthralgias [ ] arthritis  [ ] back pain  Neurological:  [ ] headache [ ] weakness [ ] seizures  [ ] confusion/altered mental status    prior hospital charts reviewed [V]  primary team notes reviewed [V]  other consultant notes reviewed [V]    PAST MEDICAL & SURGICAL HISTORY:  Atrial fibrillation  paroxysmal, on eliquis    Diabetes  Type 2    COPD (chronic obstructive pulmonary disease)    Adrenal insufficiency  Medrol daily for over 50 years    Aortic insufficiency  moderate AR on echo 5/3/2018    Pelvic fracture    Asthma    Tracheobronchomalacia  diagnosed 2015, s/p bronchial thermoplasty 2016 (Dr Zapien); recent bronchoscopy 6/5/2018 revealed no evidence of tracheobronchomalacia in trachea or bronchial tubes    Colorectal cancer  4/2018- last treatment , chemo and radiation    Rectal bleeding    Seizure  x 1 1/7/18    DVT (deep venous thrombosis)  15-20 years ago, took coumadin    TIA (transient ischemic attack)  multiple, last 5 years ago - presents as right-sided weakness    History of partial hysterectomy  30 years ago - fibroids    H/O total knee replacement, bilateral  5 years ago    History of sinus surgery  multiple sinus surgeries    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    H/O pelvic surgery  5 years ago - s/p fracture    History of tracheomalacia  2015 - attempted tracheal stenting ([facility])- course complicated by obstruction, respiratory failure, multiple CPR attempts -  stent discontinued; 10/20/2016 Tracheobronchoplasty (Prolene Mesh) performed at [facility] by Dr Zapien    S/P bronchoscopy  6/5/2018 - [facility] (Dr Zapien) no evidence of tracheobronchomalacia in trachea or bronchial tubes    Rectal bleeding  exam under anesthesia ([facility]) 2/2018        SOCIAL HISTORY:  - Denied smoking/vaping/alcohol/recreational drug use  - Used to work as an  w/ special needs clients    FAMILY HISTORY:  Family history of asthma  Family history of breast cancer (Sibling)  Family history of diabetes mellitus type II      Allergies  ampicillin (Short breath)  aspirin (Short breath)  Avelox (Short breath; Pruritus)  codeine (Short breath)  Dilaudid (Short breath)  iodine (Short breath; Swelling)  penicillin (Short breath)  shellfish (Anaphylaxis)  tetanus toxoid (Short breath)  Valium (Short breath)        ANTIMICROBIALS:  cefepime   IVPB 1000 every 8 hours  cefepime   IVPB      ANTIMICROBIALS (past 90 days):  MEDICATIONS  (STANDING):  cefepime   IVPB   100 mL/Hr IV Intermittent (11-29-21 @ 03:56)    cefepime   IVPB   100 mL/Hr IV Intermittent (11-30-21 @ 05:35)   100 mL/Hr IV Intermittent (11-29-21 @ 23:03)   100 mL/Hr IV Intermittent (11-29-21 @ 14:39)    meropenem  IVPB   100 mL/Hr IV Intermittent (11-28-21 @ 17:07)      OTHER MEDS:   MEDICATIONS  (STANDING):  acetaminophen     Tablet .. 650 every 6 hours PRN  ALBUTerol    90 MICROgram(s) HFA Inhaler 2 every 6 hours  aluminum hydroxide/magnesium hydroxide/simethicone Suspension 30 every 4 hours PRN  apixaban 5 every 12 hours  budesonide 160 MICROgram(s)/formoterol 4.5 MICROgram(s) Inhaler 2 two times a day  dextrose 40% Gel 15 once  dextrose 50% Injectable 25 once  dextrose 50% Injectable 12.5 once  dextrose 50% Injectable 25 once  diltiazem    Tablet 60 every 12 hours  glucagon  Injectable 1 once  guaiFENesin  every 12 hours  insulin lispro (ADMELOG) corrective regimen sliding scale  three times a day before meals  insulin lispro (ADMELOG) corrective regimen sliding scale  at bedtime  melatonin 3 at bedtime PRN  methylPREDNISolone 4 daily  metoclopramide Injectable 10 every 8 hours PRN  mexiletine 200 three times a day  montelukast 10 daily  pantoprazole    Tablet 40 before breakfast  polyethylene glycol 3350 17 daily  tiotropium 18 MICROgram(s) Capsule 1 daily      VITALS:  Vital Signs Last 24 Hrs  T(F): 99.4 (11-30-21 @ 04:55), Max: 100.5 (11-29-21 @ 04:36)    Vital Signs Last 24 Hrs  HR: 83 (11-30-21 @ 10:51) (69 - 83)  BP: 109/67 (11-30-21 @ 10:51) (109/67 - 115/67)  RR: 18 (11-30-21 @ 04:55)  SpO2: 92% (11-30-21 @ 10:51) (92% - 96%)  Wt(kg): --    EXAM:  Physical Exam:  Constitutional:  well preserved, comfortable  Head/Eyes: no icterus, PERRL, EOMI  ENT:  supple; no thrush  LUNGS:  CTA  CVS:  normal S1, S2, no murmur  Abd:  soft, non-tender; non-distended. colostomy site C/D/I  Ext:  no edema  Vascular:  IV site no erythema tenderness or discharge  MSK:  joints without swelling  Neuro: AAO X 3, non- focal  mediport in place     Labs:                        11.2   16.86 )-----------( 223      ( 30 Nov 2021 07:06 )             36.1     11-30    137  |  102  |  9   ----------------------------<  151<H>  4.1   |  25  |  0.70    Ca    8.5      30 Nov 2021 07:04  Phos  2.2     11-30  Mg     1.9     11-30    TPro  6.0  /  Alb  3.3  /  TBili  0.3  /  DBili  x   /  AST  11  /  ALT  10  /  AlkPhos  100  11-30      WBC Trend:  WBC Count: 16.86 (11-30-21 @ 07:06)  WBC Count: 18.05 (11-29-21 @ 06:04)  WBC Count: 12.24 (11-28-21 @ 16:31)      Auto Neutrophil #: 13.68 K/uL (11-30-21 @ 07:06)  Auto Neutrophil #: 9.07 K/uL (11-28-21 @ 16:31)  Auto Neutrophil #: 7.00 K/uL (11-16-21 @ 15:01)  Auto Neutrophil #: 8.26 K/uL (10-14-21 @ 12:53)  Auto Neutrophil #: 4.61 K/uL (10-04-21 @ 11:04)      Creatine Trend:  Creatinine, Serum: 0.70 (11-30)  Creatinine, Serum: 0.81 (11-29)  Creatinine, Serum: 0.73 (11-28)      Liver Biochemical Testing Trend:  Alanine Aminotransferase (ALT/SGPT): 10 (11-30)  Alanine Aminotransferase (ALT/SGPT): 12 (11-29)  Alanine Aminotransferase (ALT/SGPT): 15 (11-28)  Alanine Aminotransferase (ALT/SGPT): 17 (10-14)  Alanine Aminotransferase (ALT/SGPT): 19 (06-02)  Aspartate Aminotransferase (AST/SGOT): 11 (11-30-21 @ 07:04)  Aspartate Aminotransferase (AST/SGOT): 13 (11-29-21 @ 06:04)  Aspartate Aminotransferase (AST/SGOT): 15 (11-28-21 @ 16:31)  Aspartate Aminotransferase (AST/SGOT): 19 (10-14-21 @ 12:53)  Aspartate Aminotransferase (AST/SGOT): 20 (06-02-21 @ 07:54)  Bilirubin Total, Serum: 0.3 (11-30)  Bilirubin Total, Serum: 0.3 (11-29)  Bilirubin Total, Serum: 0.3 (11-28)  Bilirubin Total, Serum: 0.2 (10-14)  Bilirubin Total, Serum: 0.2 (06-02)      Trend LDH      Auto Eosinophil %: 0.6 % (11-30-21 @ 07:06)  Auto Eosinophil %: 0.5 % (11-28-21 @ 16:31)          MICROBIOLOGY:        Culture - Sputum (collected 29 Nov 2021 22:10)  Source: .Sputum Sputum    Culture - Blood (collected 28 Nov 2021 22:33)  Source: .Blood Blood-Peripheral  Preliminary Report:    No growth to date.    Culture - Blood (collected 28 Nov 2021 22:33)  Source: .Blood Blood-Peripheral  Preliminary Report:    No growth to date.    Culture - Bronchial (collected 22 Oct 2021 19:14)  Source: .Bronchial Right Bronchus  Final Report:    Few Normal Respiratory Val present    Culture - Bronchial (collected 14 Feb 2020 20:12)  Source: .Bronchial bronchoscopy sputum  Final Report:    Moderate Pseudomonas aeruginosa    No routine respiratory val Isolated  Organism: Pseudomonas aeruginosa  Pseudomonas aeruginosa  Organism: Pseudomonas aeruginosa    Sensitivities:      -  Tobramycin: S      Method Type: KB  Organism: Pseudomonas aeruginosa    Sensitivities:      -  Aztreonam: S <=4      -  Cefepime: S 8      -  Ciprofloxacin: R 2      -  Gentamicin: R >8      -  Piperacillin/Tazobactam: S <=8      Method Type: GARRETT    Culture - Sputum (collected 08 Dec 2019 00:51)  Source: .Sputum Sputum  Final Report:    Normal Respiratory Val present    Culture - Urine (collected 07 Dec 2019 10:07)  Source: .Urine Clean Catch (Midstream)  Final Report:    <10,000 CFU/mL Normal Urogenital Val    Culture - Blood (collected 07 Dec 2019 10:01)  Source: .Blood Blood-Peripheral  Final Report:    No growth at 5 days.    Culture - Blood (collected 07 Dec 2019 06:34)  Source: .Blood Blood-Peripheral  Final Report:    No growth at 5 days.        ABS CD4: 886 /uL (02-07-17 @ 11:39)                  Rapid RVP Result: NotDetec (11-28 @ 16:31)      COVID-19 Lex Domain AB Interp: Positive (11-29-21 @ 08:58)  COVID-19 Nucleocapsid DYLAN AB Interp: Negative (11-29-21 @ 08:58)  COVID-19 Lex Domain AB Interp: Positive (06-03-21 @ 10:32)      Procalcitonin, Serum: 0.44 (11-29)            Serum Pro-Brain Natriuretic Peptide: 322 (11-28)    Troponin T, High Sensitivity Result: 31 (11-29)  Troponin T, High Sensitivity Result: 20 (11-28)  Troponin T, High Sensitivity Result: 18 (11-28)    Blood Gas Venous - Lactate: 2.0 (11-28 @ 16:31)    A1C with Estimated Average Glucose Result: 7.8 % (11-30-21 @ 09:05)      RADIOLOGY:  imaging below personally reviewed

## 2021-11-30 NOTE — PROGRESS NOTE ADULT - PROBLEM SELECTOR PLAN 1
#chest discomfort and SOB  Ddx:  PNA vs PE (hx of PE, but on eliquis),tracheobronchomalacia, asthma/COPD, HF  Leukocytosis of 12, borderline T 99.3. BNP mildly elevated at 322. trop 18 --> 20. EKG with ST elevations in septal leads??  No wheezing on exam, but some crackles at bases. Intermitt rumbling sound , ? transmitted sounds from her tracheomalacia  CXR "without acute findings" wet read showing clear lung, trachea may be more narrow appearing?. Do not see any consolidations on my read  Echo 11/2021 - no significant changes from previous echo. Mild diastolic dysfunction w/ normal LV . Hx of pseudomonas in Feb 2020.  Holding off additional imaging for now  Received suraj 1g in ED.  - f/u blood and sputum cx, urine legionella   - c/w cefipime (11/29- )  - ID consult #chest discomfort and SOB  Echo 11/2021 - no significant changes from previous echo. Mild diastolic dysfunction w/ normal LV . Hx of pseudomonas in Feb 2020.  Received suraj 1g in ED.  - f/u blood and sputum cx, urine legionella   - CT chest showed lingula consolidation, LLL opacity PNA vs atelectasis, impacted distal bronchioles, 9mm RLL nodule needs f/u in 3 months   - c/w cefipime (11/29- )  - ID consult

## 2021-11-30 NOTE — PROGRESS NOTE ADULT - SUBJECTIVE AND OBJECTIVE BOX
CHIEF COMPLAINT: f/up TBM, bronchiectasis, severe persistent asthma, CB, PNA--slightly better, some production    Interval Events: none    REVIEW OF SYSTEMS:  Constitutional: No fevers or chills. No weight loss. No fatigue or generalized malaise.  Eyes: No itching or discharge from the eyes  ENT: No ear pain. No ear discharge. No nasal congestion. No post nasal drip. No epistaxis. No throat pain. No sore throat. No difficulty swallowing.   CV: No chest pain. No palpitations. No lightheadedness or dizziness.   Resp: No dyspnea at rest. + dyspnea on exertion. No orthopnea. No wheezing. + cough. No stridor. + sputum production. No chest pain with respiration.  GI: No nausea. No vomiting. No diarrhea.  MSK: No joint pain or pain in any extremities  Integumentary: No skin lesions. No pedal edema.  Neurological: No gross motor weakness. No sensory changes.  [+] All other systems negative  [ ] Unable to assess ROS because ________    OBJECTIVE:  ICU Vital Signs Last 24 Hrs  T(C): 37.4 (30 Nov 2021 04:55), Max: 37.4 (29 Nov 2021 08:51)  T(F): 99.4 (30 Nov 2021 04:55), Max: 99.4 (30 Nov 2021 04:55)  HR: 82 (30 Nov 2021 04:55) (69 - 82)  BP: 114/62 (30 Nov 2021 04:55) (91/54 - 115/67)  BP(mean): --  ABP: --  ABP(mean): --  RR: 18 (30 Nov 2021 04:55) (18 - 18)  SpO2: 96% (30 Nov 2021 04:55) (94% - 96%)        11-29 @ 07:01  -  11-30 @ 05:36  --------------------------------------------------------  IN: 0 mL / OUT: 600 mL / NET: -600 mL      CAPILLARY BLOOD GLUCOSE      POCT Blood Glucose.: 229 mg/dL (29 Nov 2021 21:29)      PHYSICAL EXAM: NAD in bed on NC O2  General: Awake, alert, oriented X 3.   HEENT: Atraumatic, normocephalic.                 Mallampatti Grade 2                No nasal congestion.                No tonsillar or pharyngeal exudates.  Lymph Nodes: No palpable lymphadenopathy  Neck: No JVD. No carotid bruit.   Respiratory: Normal chest expansion                         Normal percussion                         Normal and equal air entry                         ++ wheeze and ronchi but no rales  Cardiovascular: S1 S2 normal. No murmurs, rubs or gallops.   Abdomen: Soft, non-tender, non-distended. No organomegaly. Normoactive bowel sounds.  Extremities: Warm to touch. Peripheral pulse palpable. No pedal edema.   Skin: No rashes or skin lesions  Neurological: Motor and sensory examination equal and normal in all four extremities.  Psychiatry: Appropriate mood and affect.    HOSPITAL MEDICATIONS:  MEDICATIONS  (STANDING):  ALBUTerol    90 MICROgram(s) HFA Inhaler 2 Puff(s) Inhalation every 6 hours  budesonide 160 MICROgram(s)/formoterol 4.5 MICROgram(s) Inhaler 2 Puff(s) Inhalation two times a day  cefepime   IVPB 1000 milliGRAM(s) IV Intermittent every 8 hours  cefepime   IVPB      chlorhexidine 4% Liquid 1 Application(s) Topical <User Schedule>  dextrose 40% Gel 15 Gram(s) Oral once  dextrose 5%. 1000 milliLiter(s) (50 mL/Hr) IV Continuous <Continuous>  dextrose 5%. 1000 milliLiter(s) (100 mL/Hr) IV Continuous <Continuous>  dextrose 50% Injectable 25 Gram(s) IV Push once  dextrose 50% Injectable 12.5 Gram(s) IV Push once  dextrose 50% Injectable 25 Gram(s) IV Push once  diltiazem    Tablet 60 milliGRAM(s) Oral every 12 hours  glucagon  Injectable 1 milliGRAM(s) IntraMuscular once  guaiFENesin  milliGRAM(s) Oral every 12 hours  insulin lispro (ADMELOG) corrective regimen sliding scale   SubCutaneous three times a day before meals  insulin lispro (ADMELOG) corrective regimen sliding scale   SubCutaneous at bedtime  methylPREDNISolone 4 milliGRAM(s) Oral daily  mexiletine 200 milliGRAM(s) Oral three times a day  montelukast 10 milliGRAM(s) Oral daily  pantoprazole    Tablet 40 milliGRAM(s) Oral before breakfast  polyethylene glycol 3350 17 Gram(s) Oral daily  tiotropium 18 MICROgram(s) Capsule 1 Capsule(s) Inhalation daily    MEDICATIONS  (PRN):  acetaminophen     Tablet .. 650 milliGRAM(s) Oral every 6 hours PRN Temp greater or equal to 38C (100.4F), Mild Pain (1 - 3)  aluminum hydroxide/magnesium hydroxide/simethicone Suspension 30 milliLiter(s) Oral every 4 hours PRN Dyspepsia  melatonin 3 milliGRAM(s) Oral at bedtime PRN Insomnia  metoclopramide Injectable 10 milliGRAM(s) IV Push every 8 hours PRN nausea      LABS:                        12.4   18.05 )-----------( 229      ( 29 Nov 2021 06:04 )             40.9     11-29    139  |  103  |  11  ----------------------------<  173<H>  4.1   |  24  |  0.81    Ca    8.6      29 Nov 2021 06:04    TPro  6.1  /  Alb  3.5  /  TBili  0.3  /  DBili  x   /  AST  13  /  ALT  12  /  AlkPhos  98  11-29          Venous Blood Gas:  11-28 @ 16:31  7.34/55/42/30/70.5  VBG Lactate: 2.0      MICROBIOLOGY:     RADIOLOGY: < from: CT Chest No Cont (11.29.21 @ 10:28) >  Sagittal and coronal reformats were performed.    FINDINGS:    LUNGS AND AIRWAYS: Patent central airways. Consolidation in the lingula, compatible with pneumonia. Medial left lower lobe opacity which may represent atelectasis versus pneumonia. Scattered tree-in-bud opacities involving the right middle, right lower, and left upper lobes, consistent withimpacted distal bronchioles. New 9 mm right lower lobe subpleural nodule (2:88).  PLEURA: No pleural effusion.  MEDIASTINUM AND MARANDA: No lymphadenopathy.  VESSELS: Aortic calcifications.  HEART: Heart size is normal. Trace pericardial effusion. Aortic valve and coronary artery calcifications.  CHEST WALL AND LOWER NECK: Right chest wall infusion port with tip in the SVC.  VISUALIZED UPPER ABDOMEN: Calcified granuloma in the liver. Nonobstructing left renal calculus. Scattered pancreatic cysts, similar to prior.  BONES: Unchanged appearance of the osseous structures. Degenerative changes.    IMPRESSION:  Consolidation in the lingula, compatible with pneumonia.    Left lower lung opacity which may represent atelectasis versus pneumonia.    Scattered tree-in-bud opacities consistent with impacted distal bronchioles.    New 9 mm right lower lobe subpleural nodule, possibly an intrapulmonary lymph node. 3 month follow-up is recommended.        < end of copied text >    [ ] Reviewed and interpreted by me    Point of Care Ultrasound Findings:    PFT:    EKG:

## 2021-11-30 NOTE — PHYSICAL THERAPY INITIAL EVALUATION ADULT - PERTINENT HX OF CURRENT PROBLEM, REHAB EVAL
Pt is a 70 y/o F w/tracheobronchomalacia s/p tracheobronchoplasty, severe persistent asthma, bronchiectasis, and colon cancer s/p colectomy admitted with likely exacerbation of bronchiectasis due to pneumonia. (-) CXR 11/28/21. Continued below.

## 2021-11-30 NOTE — CONSULT NOTE ADULT - ATTENDING COMMENTS
71F with history of Tracheobronchomalasia s/p Tracheobronchoplasty, Bronchiectasis, Severe Allergic Asthma, Adrenal Insuffiencey on chronic steroids,DM2, HTN, Colorectal cancer s/p total colectomy now with colostomy, PAF on Eliquis. Follows w/ infectious disease Dr. Stout outpatient, has been treated multiple times in the past year for bronchiectasis exacerbations w/ sputum cx positive for multiple organisms including Pseudomonas. Now p/w SOB, chest tightness, and productive cough x4-5 days, found to have consolidation on CT scan, admitted w/ PNA and bronchiectasis exacerbation. ID consulted for antibiotic recommendations.     - c/w cefepime (11/28- ) for now, can deescalate based on sputum culture results  - will follow up sputum culture  - likely 7d treatment course    Ashley Liz M.D. ,   Pager [phone]     after 5PM/ weekends [phone]    Assessment and plan discussed with the primary team .

## 2021-11-30 NOTE — DISCHARGE NOTE PROVIDER - HOSPITAL COURSE
HPI:  71F with history of Tracheobronchomalasia s/p Tracheobronchoplasty, Bronchiectasis, Severe Allergic Asthma, Adrenal Insuffiencey, Port, DM2, HTN, Colorectal cancer s/p total colectomy now with colostomy, PAF on Eliquis p/w 1 day of chest discomfort associated with SOB. States yesterday evening she noticed gradual onset chest discomfort. She had to reposition herself to help with the sx. She called her MD who told her to go to the hospital. She reports the chest discomfort is 9/10, intermittent, no palliating or exacerbating factors, "tightness" in sensation, and associated with SOB and nausea. States had chronic cough,  has been making sputum. Denies any actual chest pain, abd pain, vomiting, changes in colostomy output. Denies any recent illness, rhinorrhea, HA, LE edema. Felt warm, but temp at home was 97. States has had chest discomfort in the past and at that time ended up being diagnosed with an infection (per chart review, hx of pseudomonas on bronchial cx in Feb 2020, sensitive to zosyn).    In the ED: T 99.3, HR 80, -170s, satting mid 90s on 2L nc. Given zofran, diltiazem 30 x1, eliquis 5mg x1, tylenol 650 x1, and reglan 10, and suraj 1g.   CXR w (29 Nov 2021 00:19)    Hospital course:  Pt treated with cefepime. CT chest showed consolidation of lingula, LLL opacity atelectasis vs PNA, impacted distal bronchioles, and 9mm RLL nodule possible LN requiring f/u in 3 months. Pulm assessed pt     Patient is now hemodynamically stable and medically optimized for discharge home with referrals to appropriate clinics.   HPI:  71F with history of Tracheobronchomalasia s/p Tracheobronchoplasty, Bronchiectasis, Severe Allergic Asthma, Adrenal Insuffiencey, Port, DM2, HTN, Colorectal cancer s/p total colectomy now with colostomy, PAF on Eliquis p/w 1 day of chest discomfort associated with SOB. States yesterday evening she noticed gradual onset chest discomfort. She had to reposition herself to help with the sx. She called her MD who told her to go to the hospital. She reports the chest discomfort is 9/10, intermittent, no palliating or exacerbating factors, "tightness" in sensation, and associated with SOB and nausea. States had chronic cough,  has been making sputum. Denies any actual chest pain, abd pain, vomiting, changes in colostomy output. Denies any recent illness, rhinorrhea, HA, LE edema. Felt warm, but temp at home was 97. States has had chest discomfort in the past and at that time ended up being diagnosed with an infection (per chart review, hx of pseudomonas on bronchial cx in Feb 2020, sensitive to zosyn).    In the ED: T 99.3, HR 80, -170s, satting mid 90s on 2L nc. Given zofran, diltiazem 30 x1, eliquis 5mg x1, tylenol 650 x1, and reglan 10, and suraj 1g.   CXR w (29 Nov 2021 00:19)    Hospital course:  Pt treated with cefepime. CT chest showed consolidation of lingula, LLL opacity atelectasis vs PNA, impacted distal bronchioles, and 9mm RLL nodule possible LN requiring f/u in 3 months.     Patient is now hemodynamically stable and medically optimized for discharge home with referrals to appropriate clinics.   HPI:  71F with history of Tracheobronchomalasia s/p Tracheobronchoplasty, Bronchiectasis, Severe Allergic Asthma, Adrenal Insuffiencey, Port, DM2, HTN, Colorectal cancer s/p total colectomy now with colostomy, PAF on Eliquis p/w 1 day of chest discomfort associated with SOB. States yesterday evening she noticed gradual onset chest discomfort. She had to reposition herself to help with the sx. She called her MD who told her to go to the hospital. She reports the chest discomfort is 9/10, intermittent, no palliating or exacerbating factors, "tightness" in sensation, and associated with SOB and nausea. States had chronic cough,  has been making sputum. Denies any actual chest pain, abd pain, vomiting, changes in colostomy output. Denies any recent illness, rhinorrhea, HA, LE edema. Felt warm, but temp at home was 97. States has had chest discomfort in the past and at that time ended up being diagnosed with an infection (per chart review, hx of pseudomonas on bronchial cx in Feb 2020, sensitive to zosyn).    In the ED: T 99.3, HR 80, -170s, satting mid 90s on 2L nc. Given zofran, diltiazem 30 x1, eliquis 5mg x1, tylenol 650 x1, and reglan 10, and suraj 1g.   CXR w (29 Nov 2021 00:19)    Hospital course:  CT chest showed consolidation of lingula, LLL opacity atelectasis vs PNA, impacted distal bronchioles, and 9mm RLL nodule possible LN requiring f/u in 3 months. Given her history of previous pseudomonas pneumonia, the patient was administered a seven-day course of cefepime. Subsequent to antibiotic therapy, the patient demonstrated significant improvement in her shortness of breath and in her coughing, so she was discharged home and instructed to follow up with her pulmonologist as an outpatient.     Patient is now hemodynamically stable and medically optimized for discharge home with referrals to appropriate clinics.   HPI:  71F with history of Tracheobronchomalasia s/p Tracheobronchoplasty, Bronchiectasis, Severe Allergic Asthma, Adrenal Insuffiencey, Port, DM2, HTN, Colorectal cancer s/p total colectomy now with colostomy, PAF on Eliquis p/w 1 day of chest discomfort associated with SOB. States yesterday evening she noticed gradual onset chest discomfort. She had to reposition herself to help with the sx. She called her MD who told her to go to the hospital. She reports the chest discomfort is 9/10, intermittent, no palliating or exacerbating factors, "tightness" in sensation, and associated with SOB and nausea. States had chronic cough,  has been making sputum. Denies any actual chest pain, abd pain, vomiting, changes in colostomy output. Denies any recent illness, rhinorrhea, HA, LE edema. Felt warm, but temp at home was 97. States has had chest discomfort in the past and at that time ended up being diagnosed with an infection (per chart review, hx of pseudomonas on bronchial cx in Feb 2020, sensitive to zosyn).    In the ED: T 99.3, HR 80, -170s, satting mid 90s on 2L nc. Given zofran, diltiazem 30 x1, eliquis 5mg x1, tylenol 650 x1, and reglan 10, and suraj 1g.   CXR w (29 Nov 2021 00:19)    Hospital course:  CT chest showed consolidation of lingula, LLL opacity atelectasis vs PNA, impacted distal bronchioles, and 9mm RLL nodule possible LN requiring f/u in 3 months. Given her history of previous pseudomonas pneumonia, the patient was administered a seven-day course of cefepime for presumed gram negative bacterial pneumonia. Subsequent to antibiotic therapy, the patient demonstrated significant improvement in her shortness of breath and in her coughing, so she was discharged home and instructed to follow up with her pulmonologist as an outpatient.     Patient is now hemodynamically stable and medically optimized for discharge home with referrals to appropriate clinics.

## 2021-11-30 NOTE — PROGRESS NOTE ADULT - PROBLEM SELECTOR PLAN 6
#prophylactic measure  DVT ppx- c/w eliquis  Diet regular    daughter updated 11/29 #prophylactic measure  DVT ppx- c/w eliquis  Diet regular    daughter updated 11/30

## 2021-11-30 NOTE — PHYSICAL THERAPY INITIAL EVALUATION ADULT - ADDITIONAL COMMENTS
Pt states she is originally from [place] but is residing with her sister in [place] in a house with 12 steps to enter, +HR. Pt was independent with all ADLs and ambulation PTA. Pt used a rollator PTA. Pt states her sister is available to assist when needed. +reading eyeglasses.

## 2021-11-30 NOTE — DISCHARGE NOTE PROVIDER - PROVIDER TOKENS
PROVIDER:[TOKEN:[[id number]]] PROVIDER:[TOKEN:[[id number]],SCHEDULEDAPPT:[12/13/2021],SCHEDULEDAPPTTIME:[12:00 PM]],PROVIDER:[TOKEN:[[id number]],FOLLOWUP:[1 month]],PROVIDER:[TOKEN:[[id number]],FOLLOWUP:[1 month]]

## 2021-11-30 NOTE — PROGRESS NOTE ADULT - PROBLEM SELECTOR PLAN 4
#IDDM  Takes lantus 15u qhs, and humalog ISS, victoza, at home  Currently with nausea and poor PO intake,   - ISS

## 2021-11-30 NOTE — PHYSICAL THERAPY INITIAL EVALUATION ADULT - DISCHARGE DISPOSITION, PT EVAL
Patient is cleared for D/C home from physical therapy standpoint. Patient is agreeable, RN Sanjeev and  Calli Durán aware./no skilled PT needs

## 2021-11-30 NOTE — CONSULT NOTE ADULT - ASSESSMENT
71F with history of Tracheobronchomalasia s/p Tracheobronchoplasty, Bronchiectasis, Severe Allergic Asthma, Adrenal Insuffiencey on chronic steroids,DM2, HTN, Colorectal cancer s/p total colectomy now with colostomy, PAF on Eliquis p/w 1 day of chest discomfort associated with SOB. Follows w/ infectious disease Dr. Stout outpatient, has been treated multiple times in the past year for bronchiectasis exacerbations w/ sputum cx positive for multiple organisms including Pseudomonas. ID consulted for antibiotic recommendations.  71F with history of Tracheobronchomalasia s/p Tracheobronchoplasty, Bronchiectasis, Severe Allergic Asthma, Adrenal Insuffiencey on chronic steroids,DM2, HTN, Colorectal cancer s/p total colectomy now with colostomy, PAF on Eliquis. Follows w/ infectious disease Dr. Stout outpatient, has been treated multiple times in the past year for bronchiectasis exacerbations w/ sputum cx positive for multiple organisms including Pseudomonas. Now p/w SOB, chest tightness, and productive cough x4-5 days, found to have consolidation on CT scan, admitted w/ PNA. ID consulted for antibiotic recommendations.  71F with history of Tracheobronchomalasia s/p Tracheobronchoplasty, Bronchiectasis, Severe Allergic Asthma, Adrenal Insuffiencey on chronic steroids,DM2, HTN, Colorectal cancer s/p total colectomy now with colostomy, PAF on Eliquis. Follows w/ infectious disease Dr. Stout outpatient, has been treated multiple times in the past year for bronchiectasis exacerbations w/ sputum cx positive for multiple organisms including Pseudomonas. Now p/w SOB, chest tightness, and productive cough x4-5 days, found to have consolidation on CT scan, admitted w/ PNA and bronchiectasis exacerbation. ID consulted for antibiotic recommendations.  71F with history of Tracheobronchomalasia s/p Tracheobronchoplasty, Bronchiectasis, Severe Allergic Asthma, Adrenal Insuffiencey on chronic steroids,DM2, HTN, Colorectal cancer s/p total colectomy now with colostomy, PAF on Eliquis. Follows w/ infectious disease Dr. Stout outpatient, has been treated multiple times in the past year for bronchiectasis exacerbations w/ sputum cx positive for multiple organisms including Pseudomonas. Now p/w SOB, chest tightness, and productive cough x4-5 days, found to have consolidation on CT scan, admitted w/ PNA and bronchiectasis exacerbation. ID consulted for antibiotic recommendations.     - c/w cefepime (11/28- ) for now, can deescalate based on sputum cx results  - treatment length pending abx regimen based on cx results    Case discussed w/ attending Dr. Shalonda Zamora MD, PGY-3 Resident  Department of Internal Medicine  Pager: [phone] (NS) / [phone] (CASIE)  Email: [email or web address]   71F with history of Tracheobronchomalasia s/p Tracheobronchoplasty, Bronchiectasis, Severe Allergic Asthma, Adrenal Insuffiencey on chronic steroids,DM2, HTN, Colorectal cancer s/p total colectomy now with colostomy, PAF on Eliquis. Follows w/ infectious disease Dr. Stout outpatient, has been treated multiple times in the past year for bronchiectasis exacerbations w/ sputum cx positive for multiple organisms including Pseudomonas. Now p/w SOB, chest tightness, and productive cough x4-5 days, found to have consolidation on CT scan, admitted w/ PNA and bronchiectasis exacerbation. ID consulted for antibiotic recommendations.     - c/w cefepime (11/28- ) for now, can deescalate based on sputum culture results  - will follow up sputum culture  - likely 7d treatment course    Case discussed w/ attending Dr. Shalonda Zamora MD, PGY-3 Resident  Department of Internal Medicine  Pager: [phone] (NS) / [phone] (CASIE)  Email: [email or web address]

## 2021-11-30 NOTE — DISCHARGE NOTE PROVIDER - NSDCHHATTENDCERT_GEN_ALL_CORE
Injection given without difficulties.Bandaid applied. Patient instructed to stay in the clinic for 15 minutes. Patient verbalized understanding and will notify nurse with any complaints.  
My signature below certifies that the above stated patient is homebound and upon completion of the Face-To-Face encounter, has the need for intermittent skilled nursing, physical therapy and/or speech or occupational therapy services in their home for their current diagnosis as outlined in their initial plan of care. These services will continue to be monitored by myself or another physician.

## 2021-11-30 NOTE — DISCHARGE NOTE PROVIDER - NSDCFUADDAPPT_GEN_ALL_CORE_FT
We have provided you with a referral to Dr. Samuels and to Dr. Gonzalez, two excellent primary care physicians. Please be sure to follow up with a primary care physician in addition to Dr. Allison.

## 2021-11-30 NOTE — DISCHARGE NOTE PROVIDER - NSDCFUSCHEDAPPT_GEN_ALL_CORE_FT
RAYRAY RODRIGUEZ ; 12/08/2021 ; NPP Urology [address] [facility]  RAYRAY RODRIGUEZ ; 12/08/2021 ; [facility] [address] [place] [address]  RAYRAY RODRIGUEZ ; 12/14/2021 ; [facility] [place] [facility]  RAYRAY RODRIGUEZ ; 12/28/2021 ; NP [facility]  RAYRAY RODRIGUEZ ; 01/27/2022 ; [facility] Card[facility] [address]  RAYRAY RODRIGUEZ ; 01/27/2022 ; [facility] Card[facility] Electro [address]  RAYRAY RODRIGUEZ ; 02/08/2022 ; [facility] RAYRAY RODRIGUEZ ; 12/08/2021 ; NPP Urology [facility]  RAYRAY RODRIGUEZ ; 12/08/2021 ; NPP Urology [facility]  RAYRAY RODRIGUEZ ; 12/13/2021 ; NPP PulmMed [address] [facility]  RAYRAY RODRIGUEZ ; 12/14/2021 ; NPP [facility]  RAYRAY RODRIGUEZ ; 12/28/2021 ; NPP [facility]  RAYRAY RODRIGUEZ ; 01/27/2022 ; NPP Cardio [address]  RAYRAY RODRIGUEZ ; 01/27/2022 ; NPP Cardio Electro [address]  RAYRAY RODRIGUEZ ; 02/08/2022 ; NPP [facility] RAYRAY RODRIGUEZ ; 12/08/2021 ; [facility]  RAYRAY RODRIGUEZ ; 12/13/2021 ; NPP PulmMed [address] [facility]  RAYRAY RODRIGUEZ ; 12/14/2021 ; NPP [facility]  RAYRAY RODRIGUEZ ; 12/28/2021 ; NPP [facility]  RAYRAY RODRIGUEZ ; 01/27/2022 ; NPP Cardio [address]  RAYRAY RODRIGUEZ ; 01/27/2022 ; NPP Cardio Electro [address]  RAYRAY RODRIGUEZ ; 01/31/2022 ; NPP PulmMed [address]  RAYRAY RODRIGUEZ ; 02/08/2022 ; NPP [facility]

## 2021-11-30 NOTE — PROGRESS NOTE ADULT - ASSESSMENT
70 y/o F w/tracheobronchomalacia s/p tracheobronchoplasty, severe persistent asthma, bronchiectasis, and colon cancer s/p colectomy admitted with likely exacerbation of bronchiectasis due to pneumonia.    - Supplemental O2 as needed goal O2 sat >= 90%  - Broad spectrum abx including pseudomonal coverage  - Airway clearance, acapella device, bronchodilators, chest PT  - Continue home asthma regimen  - Repeat CT scan in 3 months for follow up of new pulmonary nodule  ***************************************  11/30-better over all                                                                                    SEE Below:

## 2021-11-30 NOTE — DISCHARGE NOTE PROVIDER - NSDCCPCAREPLAN_GEN_ALL_CORE_FT
PRINCIPAL DISCHARGE DIAGNOSIS  Diagnosis: Shortness of breath  Assessment and Plan of Treatment: You were admitted to the hospital because of shortness of breath. Imaging showed pneumonia and you were treated with antibioitics and improved.   Please follow up with your primary care and lung doctors.  If you start to experience chest pain, shortness of breath, abdominal pain, nausea, or vomiting please return to the emergency room.        SECONDARY DISCHARGE DIAGNOSES  Diagnosis: Pulmonary nodule  Assessment and Plan of Treatment: On imaging of your chest, a 9mm nodule in your right lower lobe was found. It is unclear what the nodule is so you need to follow-up with repeat imaging in 3 months to monitor it.   Please follow up with your primary care and lung doctors.   If you start to experience chest pain, shortness of breath, abdominal pain, nausea, or vomiting please return to the emergency room.       PRINCIPAL DISCHARGE DIAGNOSIS  Diagnosis: Shortness of breath  Assessment and Plan of Treatment: You were admitted to the hospital because of shortness of breath. Imaging showed pneumonia and you were treated with antibioitics and improved.   Please follow up with Dr. Allison within two weeks of discharge from the hospital. Please also follow up with a primary care physician. We have provided you with a referral to a new primary care physician.   If you start to experience chest pain, shortness of breath, abdominal pain, nausea, or vomiting please return to the emergency room.        SECONDARY DISCHARGE DIAGNOSES  Diagnosis: Pulmonary nodule  Assessment and Plan of Treatment: On imaging of your chest, a 9mm nodule in your right lower lobe was found. It is unclear what the nodule is so you need to follow-up with repeat imaging in 3 months to monitor it.   Please follow up with your primary care and lung doctors.   If you start to experience chest pain, shortness of breath, abdominal pain, nausea, or vomiting please return to the emergency room.      Diagnosis: Diabetes mellitus  Assessment and Plan of Treatment: We know that you have a history of diabetes mellitus. We also know that any time a patient takes steroids this can increase the patient's blood glucose level. For that reason, you take insulin at home. Instead of taking a varying amount of insulin at meal times, we recommend that you take two units of short-acting insulin (lispro) immediately prior to breakfast and prior to dinner and four units of short-acting insulin (lispro) immediately prior to lunch. If you do not eat a meal, do not take your short-acting insulin. Please continue to take your long-acting insulin (lantus) each night. Please be sure to measure your blood glucose level prior to meals and before bed. If ever you have a blood glucose level that measures less than 80 or if you have dizziness or a fall, please see your doctor or go to the emergency department. Please return to the emergency department if you ever feel dizziness, if you fall, if you urinate a lot, or if you get very thirstyy.

## 2021-11-30 NOTE — PROGRESS NOTE ADULT - SUBJECTIVE AND OBJECTIVE BOX
Bozena LisaRadha | PGY-1  Internal Medicine  Pager: [phone] NS/ [phone] [facility]    OVERNIGHT EVENTS: no overnight events      SUBJECTIVE: Patient was examined at bedside this morning. Appeared comfortable. Overnight vitals and monitoring results were reviewed. Reporting no complaints. Denied chest pain, SOB, abdominal pain, vomiting, diarrhea, constipation.       MEDICATIONS  (STANDING):  ALBUTerol    90 MICROgram(s) HFA Inhaler 2 Puff(s) Inhalation every 6 hours  apixaban 5 milliGRAM(s) Oral every 12 hours  budesonide 160 MICROgram(s)/formoterol 4.5 MICROgram(s) Inhaler 2 Puff(s) Inhalation two times a day  cefepime   IVPB 1000 milliGRAM(s) IV Intermittent every 8 hours  cefepime   IVPB      chlorhexidine 4% Liquid 1 Application(s) Topical <User Schedule>  dextrose 40% Gel 15 Gram(s) Oral once  dextrose 5%. 1000 milliLiter(s) (50 mL/Hr) IV Continuous <Continuous>  dextrose 5%. 1000 milliLiter(s) (100 mL/Hr) IV Continuous <Continuous>  dextrose 50% Injectable 25 Gram(s) IV Push once  dextrose 50% Injectable 12.5 Gram(s) IV Push once  dextrose 50% Injectable 25 Gram(s) IV Push once  diltiazem    Tablet 60 milliGRAM(s) Oral every 12 hours  glucagon  Injectable 1 milliGRAM(s) IntraMuscular once  guaiFENesin  milliGRAM(s) Oral every 12 hours  insulin lispro (ADMELOG) corrective regimen sliding scale   SubCutaneous three times a day before meals  insulin lispro (ADMELOG) corrective regimen sliding scale   SubCutaneous at bedtime  methylPREDNISolone 4 milliGRAM(s) Oral daily  mexiletine 200 milliGRAM(s) Oral three times a day  montelukast 10 milliGRAM(s) Oral daily  pantoprazole    Tablet 40 milliGRAM(s) Oral before breakfast  polyethylene glycol 3350 17 Gram(s) Oral daily  potassium phosphate / sodium phosphate Powder (PHOS-NaK) 1 Packet(s) Oral once  tiotropium 18 MICROgram(s) Capsule 1 Capsule(s) Inhalation daily    MEDICATIONS  (PRN):  acetaminophen     Tablet .. 650 milliGRAM(s) Oral every 6 hours PRN Temp greater or equal to 38C (100.4F), Mild Pain (1 - 3)  aluminum hydroxide/magnesium hydroxide/simethicone Suspension 30 milliLiter(s) Oral every 4 hours PRN Dyspepsia  melatonin 3 milliGRAM(s) Oral at bedtime PRN Insomnia  metoclopramide Injectable 10 milliGRAM(s) IV Push every 8 hours PRN nausea        T(F): 99.4 (11-30-21 @ 04:55), Max: 99.4 (11-30-21 @ 04:55)  HR: 82 (11-30-21 @ 04:55) (69 - 82)  BP: 114/62 (11-30-21 @ 04:55) (91/54 - 115/67)  BP(mean): --  RR: 18 (11-30-21 @ 04:55) (18 - 18)  SpO2: 96% (11-30-21 @ 04:55) (94% - 96%)    PHYSICAL EXAM:     GENERAL: NAD, lying in bed comfortably  HEAD:  Atraumatic, Normocephalic  EYES:  R eye injected, L eye false eye  CHEST/LUNG: on 2L NC. Crackles b/l lower lobes.   HEART: Regular rate and rhythm; No murmurs, rubs, or gallops, normal S1/S2  ABDOMEN: normal bowel sounds; Soft, nontender, nondistended, no organomegaly.  + colostomy brown output  EXTREMITIES:  2+ Peripheral Pulses, brisk capillary refill. No clubbing, cyanosis, or edema  MSK: No gross deformities noted   Neurological:  A&Ox3, no focal deficits       TELEMETRY:    LABS:                        11.2   16.86 )-----------( 223      ( 30 Nov 2021 07:06 )             36.1     11-30    137  |  102  |  9   ----------------------------<  151<H>  4.1   |  25  |  0.70    Ca    8.5      30 Nov 2021 07:04  Phos  2.2     11-30  Mg     1.9     11-30    TPro  6.0  /  Alb  3.3  /  TBili  0.3  /  DBili  x   /  AST  11  /  ALT  10  /  AlkPhos  100  11-30            Creatinine Trend: 0.70<--, 0.81<--, 0.73<--  I&O's Summary    29 Nov 2021 07:01  -  30 Nov 2021 07:00  --------------------------------------------------------  IN: 0 mL / OUT: 600 mL / NET: -600 mL      BNP    RADIOLOGY & ADDITIONAL STUDIES:

## 2021-11-30 NOTE — PROGRESS NOTE ADULT - ATTENDING COMMENTS
-ID and pulm recs appreciated. -Improving on cefepime. Sputum culture pending.   -UA given reported ?hematuria.   -Need outpatient follow up CT chest, patient aware.   -Moderate insulin sliding scale.

## 2021-11-30 NOTE — PROGRESS NOTE ADULT - TIME BILLING
as above:  multifactorial dyspnea-TBM, CB, severe persistent asthma, PNA, debility, anemia, CAD--O2 NC sat above 90%  PNA-f/up sputum cx--cefepime D3 of ? (ID formal luis-Afshan Stout)  AZC-PW-irpiaypt/vest rx, incentive spirometry  asthma-medrol 4mg, spiriva/symbicort, duoneb q 6, singulair 10 q hs,  allergy-claritin/flonase  gerd-ppi  abnormal CT-nodule-f/up 3 months                    cards-on mult rx-to continue  PT-OOB    DVT prophylaxis  DC planning    Eulalio Allison MD-Pulmonary   [phone]

## 2021-11-30 NOTE — DISCHARGE NOTE PROVIDER - NSDCMRMEDTOKEN_GEN_ALL_CORE_FT
apixaban 5 mg oral tablet: 1 tab(s) orally every 12 hours  Breo Ellipta 200 mcg-25 mcg/inh inhalation powder: 1 puff(s) inhaled once a day  Crestor 5 mg oral tablet: 1 tab(s) orally once a day (at bedtime)  dilTIAZem 30 mg oral tablet: 2 tab(s) orally every 12 hours  HumaLOG 100 units/mL subcutaneous solution: Sliding Scale  ipratropium-albuterol 0.5 mg-2.5 mg/3 mLinhalation solution: 3 milliliter(s) inhaled every 6 hours  Lantus 100 units/mL subcutaneous solution: 15 unit(s) subcutaneous once (at bedtime)  methylPREDNISolone 4 mg oral tablet: 1 tab(s) orally once a day  mexiletine 200 mg oral capsule: 1 cap(s) orally 3 times a day  MiraLax oral powder for reconstitution: 17 gram(s) orally once a day  Protonix 40 mg oral delayed release tablet: 1 tab(s) orally once a day - 1/2 hour before breakfast  Senna 8.6 mg oral tablet: 1 tab(s) orally once a day (at bedtime)  Singulair 10 mg oral tablet: 1 tab(s) orally once a day  Spiriva 18 mcg inhalation capsule: 1 cap(s) inhaled once a day  Victoza 18 mg/3 mL subcutaneous solution: 1.8 milligram(s) subcutaneous once a day  Zofran 8 mg oral tablet: 1 tab(s) orally 3 times a day, As Needed   apixaban 5 mg oral tablet: 1 tab(s) orally every 12 hours  benzonatate 100 mg oral capsule: 1 cap(s) orally 3 times a day, As needed, Cough  Breo Ellipta 200 mcg-25 mcg/inh inhalation powder: 1 puff(s) inhaled once a day  Crestor 5 mg oral tablet: 1 tab(s) orally once a day (at bedtime)  dilTIAZem 30 mg oral tablet: 2 tab(s) orally every 12 hours  insulin lispro 100 units/mL injectable solution: 2 unit(s) injectable 3 times a day    two units prior to breakfast and dinner; four units prior to lunch  ipratropium-albuterol 0.5 mg-2.5 mg/3 mLinhalation solution: 3 milliliter(s) inhaled every 6 hours  Lantus 100 units/mL subcutaneous solution: 15 unit(s) subcutaneous once (at bedtime)  methylPREDNISolone 4 mg oral tablet: 1 tab(s) orally once a day  mexiletine 200 mg oral capsule: 1 cap(s) orally 3 times a day  MiraLax oral powder for reconstitution: 17 gram(s) orally once a day  Protonix 40 mg oral delayed release tablet: 1 tab(s) orally once a day - 1/2 hour before breakfast  Senna 8.6 mg oral tablet: 1 tab(s) orally once a day (at bedtime)  Singulair 10 mg oral tablet: 1 tab(s) orally once a day  Spiriva 18 mcg inhalation capsule: 1 cap(s) inhaled once a day  Victoza 18 mg/3 mL subcutaneous solution: 1.8 milligram(s) subcutaneous once a day  Zofran 8 mg oral tablet: 1 tab(s) orally 3 times a day, As Needed   apixaban 5 mg oral tablet: 1 tab(s) orally every 12 hours  benzonatate 100 mg oral capsule: 1 cap(s) orally 3 times a day, As needed, Cough  Breo Ellipta 200 mcg-25 mcg/inh inhalation powder: 1 puff(s) inhaled once a day  Crestor 5 mg oral tablet: 1 tab(s) orally once a day (at bedtime)  dilTIAZem 30 mg oral tablet: 2 tab(s) orally every 12 hours  insulin lispro 100 units/mL injectable solution: 2 unit(s) injectable 3 times a day    two units prior to breakfast and dinner; four units prior to lunch  ipratropium-albuterol 0.5 mg-2.5 mg/3 mLinhalation solution: 3 milliliter(s) inhaled every 6 hours  Lantus 100 units/mL subcutaneous solution: 12 unit(s) subcutaneous once a day (at bedtime)  methylPREDNISolone 4 mg oral tablet: 1 tab(s) orally once a day  mexiletine 200 mg oral capsule: 1 cap(s) orally 3 times a day  MiraLax oral powder for reconstitution: 17 gram(s) orally once a day  Protonix 40 mg oral delayed release tablet: 1 tab(s) orally once a day - 1/2 hour before breakfast  Senna 8.6 mg oral tablet: 1 tab(s) orally once a day (at bedtime)  Singulair 10 mg oral tablet: 1 tab(s) orally once a day  Spiriva 18 mcg inhalation capsule: 1 cap(s) inhaled once a day  Victoza 18 mg/3 mL subcutaneous solution: 1.8 milligram(s) subcutaneous once a day  Zofran 8 mg oral tablet: 1 tab(s) orally 3 times a day, As Needed

## 2021-12-01 LAB
ALBUMIN SERPL ELPH-MCNC: 3.3 G/DL — SIGNIFICANT CHANGE UP (ref 3.3–5)
ALP SERPL-CCNC: 94 U/L — SIGNIFICANT CHANGE UP (ref 40–120)
ALT FLD-CCNC: 11 U/L — SIGNIFICANT CHANGE UP (ref 10–45)
ANION GAP SERPL CALC-SCNC: 12 MMOL/L — SIGNIFICANT CHANGE UP (ref 5–17)
AST SERPL-CCNC: 12 U/L — SIGNIFICANT CHANGE UP (ref 10–40)
BASOPHILS # BLD AUTO: 0.01 K/UL — SIGNIFICANT CHANGE UP (ref 0–0.2)
BASOPHILS NFR BLD AUTO: 0.1 % — SIGNIFICANT CHANGE UP (ref 0–2)
BILIRUB SERPL-MCNC: 0.2 MG/DL — SIGNIFICANT CHANGE UP (ref 0.2–1.2)
BUN SERPL-MCNC: 12 MG/DL — SIGNIFICANT CHANGE UP (ref 7–23)
CALCIUM SERPL-MCNC: 8.8 MG/DL — SIGNIFICANT CHANGE UP (ref 8.4–10.5)
CHLORIDE SERPL-SCNC: 101 MMOL/L — SIGNIFICANT CHANGE UP (ref 96–108)
CO2 SERPL-SCNC: 27 MMOL/L — SIGNIFICANT CHANGE UP (ref 22–31)
CREAT SERPL-MCNC: 0.65 MG/DL — SIGNIFICANT CHANGE UP (ref 0.5–1.3)
EOSINOPHIL # BLD AUTO: 0.14 K/UL — SIGNIFICANT CHANGE UP (ref 0–0.5)
EOSINOPHIL NFR BLD AUTO: 1.2 % — SIGNIFICANT CHANGE UP (ref 0–6)
GLUCOSE SERPL-MCNC: 231 MG/DL — HIGH (ref 70–99)
HCT VFR BLD CALC: 34.9 % — SIGNIFICANT CHANGE UP (ref 34.5–45)
HGB BLD-MCNC: 10.8 G/DL — LOW (ref 11.5–15.5)
IMM GRANULOCYTES NFR BLD AUTO: 0.8 % — SIGNIFICANT CHANGE UP (ref 0–1.5)
LYMPHOCYTES # BLD AUTO: 1.31 K/UL — SIGNIFICANT CHANGE UP (ref 1–3.3)
LYMPHOCYTES # BLD AUTO: 11.1 % — LOW (ref 13–44)
MAGNESIUM SERPL-MCNC: 2.1 MG/DL — SIGNIFICANT CHANGE UP (ref 1.6–2.6)
MCHC RBC-ENTMCNC: 24.1 PG — LOW (ref 27–34)
MCHC RBC-ENTMCNC: 30.9 GM/DL — LOW (ref 32–36)
MCV RBC AUTO: 77.9 FL — LOW (ref 80–100)
MONOCYTES # BLD AUTO: 1.33 K/UL — HIGH (ref 0–0.9)
MONOCYTES NFR BLD AUTO: 11.3 % — SIGNIFICANT CHANGE UP (ref 2–14)
MRSA PCR RESULT.: DETECTED
NEUTROPHILS # BLD AUTO: 8.9 K/UL — HIGH (ref 1.8–7.4)
NEUTROPHILS NFR BLD AUTO: 75.5 % — SIGNIFICANT CHANGE UP (ref 43–77)
NRBC # BLD: 0 /100 WBCS — SIGNIFICANT CHANGE UP (ref 0–0)
PHOSPHATE SERPL-MCNC: 2.4 MG/DL — LOW (ref 2.5–4.5)
PLATELET # BLD AUTO: 220 K/UL — SIGNIFICANT CHANGE UP (ref 150–400)
POTASSIUM SERPL-MCNC: 4 MMOL/L — SIGNIFICANT CHANGE UP (ref 3.5–5.3)
POTASSIUM SERPL-SCNC: 4 MMOL/L — SIGNIFICANT CHANGE UP (ref 3.5–5.3)
PROT SERPL-MCNC: 6 G/DL — SIGNIFICANT CHANGE UP (ref 6–8.3)
RBC # BLD: 4.48 M/UL — SIGNIFICANT CHANGE UP (ref 3.8–5.2)
RBC # FLD: 16.1 % — HIGH (ref 10.3–14.5)
S AUREUS DNA NOSE QL NAA+PROBE: DETECTED
SODIUM SERPL-SCNC: 140 MMOL/L — SIGNIFICANT CHANGE UP (ref 135–145)
WBC # BLD: 11.79 K/UL — HIGH (ref 3.8–10.5)
WBC # FLD AUTO: 11.79 K/UL — HIGH (ref 3.8–10.5)

## 2021-12-01 PROCEDURE — 99232 SBSQ HOSP IP/OBS MODERATE 35: CPT

## 2021-12-01 PROCEDURE — 99232 SBSQ HOSP IP/OBS MODERATE 35: CPT | Mod: GC

## 2021-12-01 PROCEDURE — 93010 ELECTROCARDIOGRAM REPORT: CPT

## 2021-12-01 RX ORDER — INSULIN GLARGINE 100 [IU]/ML
12 INJECTION, SOLUTION SUBCUTANEOUS ONCE
Refills: 0 | Status: COMPLETED | OUTPATIENT
Start: 2021-12-01 | End: 2021-12-01

## 2021-12-01 RX ORDER — SODIUM,POTASSIUM PHOSPHATES 278-250MG
1 POWDER IN PACKET (EA) ORAL EVERY 6 HOURS
Refills: 0 | Status: COMPLETED | OUTPATIENT
Start: 2021-12-01 | End: 2021-12-01

## 2021-12-01 RX ORDER — INSULIN GLARGINE 100 [IU]/ML
15 INJECTION, SOLUTION SUBCUTANEOUS AT BEDTIME
Refills: 0 | Status: DISCONTINUED | OUTPATIENT
Start: 2021-12-01 | End: 2021-12-02

## 2021-12-01 RX ORDER — SODIUM CHLORIDE 9 MG/ML
1000 INJECTION, SOLUTION INTRAVENOUS
Refills: 0 | Status: DISCONTINUED | OUTPATIENT
Start: 2021-12-01 | End: 2021-12-06

## 2021-12-01 RX ORDER — SODIUM CHLORIDE 0.65 %
1 AEROSOL, SPRAY (ML) NASAL
Refills: 0 | Status: DISCONTINUED | OUTPATIENT
Start: 2021-12-01 | End: 2021-12-06

## 2021-12-01 RX ORDER — INSULIN GLARGINE 100 [IU]/ML
10 INJECTION, SOLUTION SUBCUTANEOUS AT BEDTIME
Refills: 0 | Status: DISCONTINUED | OUTPATIENT
Start: 2021-12-01 | End: 2021-12-01

## 2021-12-01 RX ORDER — INSULIN HUMAN 100 [IU]/ML
3 INJECTION, SOLUTION SUBCUTANEOUS ONCE
Refills: 0 | Status: COMPLETED | OUTPATIENT
Start: 2021-12-01 | End: 2021-12-01

## 2021-12-01 RX ORDER — ACETYLCYSTEINE 200 MG/ML
4 VIAL (ML) MISCELLANEOUS THREE TIMES A DAY
Refills: 0 | Status: COMPLETED | OUTPATIENT
Start: 2021-12-01 | End: 2021-12-03

## 2021-12-01 RX ORDER — MUPIROCIN 20 MG/G
1 OINTMENT TOPICAL
Refills: 0 | Status: COMPLETED | OUTPATIENT
Start: 2021-12-01 | End: 2021-12-06

## 2021-12-01 RX ORDER — GLUCAGON INJECTION, SOLUTION 0.5 MG/.1ML
1 INJECTION, SOLUTION SUBCUTANEOUS ONCE
Refills: 0 | Status: DISCONTINUED | OUTPATIENT
Start: 2021-12-01 | End: 2021-12-06

## 2021-12-01 RX ORDER — INSULIN LISPRO 100/ML
2 VIAL (ML) SUBCUTANEOUS
Refills: 0 | Status: DISCONTINUED | OUTPATIENT
Start: 2021-12-01 | End: 2021-12-06

## 2021-12-01 RX ORDER — INSULIN HUMAN 100 [IU]/ML
3 INJECTION, SOLUTION SUBCUTANEOUS ONCE
Refills: 0 | Status: DISCONTINUED | OUTPATIENT
Start: 2021-12-01 | End: 2021-12-01

## 2021-12-01 RX ADMIN — Medication 60 MILLIGRAM(S): at 06:47

## 2021-12-01 RX ADMIN — MEXILETINE HYDROCHLORIDE 200 MILLIGRAM(S): 150 CAPSULE ORAL at 06:24

## 2021-12-01 RX ADMIN — CEFEPIME 100 MILLIGRAM(S): 1 INJECTION, POWDER, FOR SOLUTION INTRAMUSCULAR; INTRAVENOUS at 06:25

## 2021-12-01 RX ADMIN — MEXILETINE HYDROCHLORIDE 200 MILLIGRAM(S): 150 CAPSULE ORAL at 14:01

## 2021-12-01 RX ADMIN — BUDESONIDE AND FORMOTEROL FUMARATE DIHYDRATE 2 PUFF(S): 160; 4.5 AEROSOL RESPIRATORY (INHALATION) at 17:09

## 2021-12-01 RX ADMIN — APIXABAN 5 MILLIGRAM(S): 2.5 TABLET, FILM COATED ORAL at 06:23

## 2021-12-01 RX ADMIN — INSULIN HUMAN 3 UNIT(S): 100 INJECTION, SOLUTION SUBCUTANEOUS at 18:45

## 2021-12-01 RX ADMIN — Medication 4 MILLILITER(S): at 23:39

## 2021-12-01 RX ADMIN — TIOTROPIUM BROMIDE 1 CAPSULE(S): 18 CAPSULE ORAL; RESPIRATORY (INHALATION) at 13:20

## 2021-12-01 RX ADMIN — Medication 1 SPRAY(S): at 18:36

## 2021-12-01 RX ADMIN — CEFEPIME 100 MILLIGRAM(S): 1 INJECTION, POWDER, FOR SOLUTION INTRAMUSCULAR; INTRAVENOUS at 14:03

## 2021-12-01 RX ADMIN — MONTELUKAST 10 MILLIGRAM(S): 4 TABLET, CHEWABLE ORAL at 11:56

## 2021-12-01 RX ADMIN — BUDESONIDE AND FORMOTEROL FUMARATE DIHYDRATE 2 PUFF(S): 160; 4.5 AEROSOL RESPIRATORY (INHALATION) at 05:24

## 2021-12-01 RX ADMIN — Medication 4 MILLIGRAM(S): at 06:24

## 2021-12-01 RX ADMIN — MUPIROCIN 1 APPLICATION(S): 20 OINTMENT TOPICAL at 18:31

## 2021-12-01 RX ADMIN — Medication 60 MILLIGRAM(S): at 18:29

## 2021-12-01 RX ADMIN — ALBUTEROL 2 PUFF(S): 90 AEROSOL, METERED ORAL at 05:24

## 2021-12-01 RX ADMIN — Medication 2 UNIT(S): at 18:28

## 2021-12-01 RX ADMIN — Medication 1 PACKET(S): at 18:32

## 2021-12-01 RX ADMIN — Medication 1200 MILLIGRAM(S): at 18:30

## 2021-12-01 RX ADMIN — ALBUTEROL 2 PUFF(S): 90 AEROSOL, METERED ORAL at 17:09

## 2021-12-01 RX ADMIN — POLYETHYLENE GLYCOL 3350 17 GRAM(S): 17 POWDER, FOR SOLUTION ORAL at 11:57

## 2021-12-01 RX ADMIN — CEFEPIME 100 MILLIGRAM(S): 1 INJECTION, POWDER, FOR SOLUTION INTRAMUSCULAR; INTRAVENOUS at 22:18

## 2021-12-01 RX ADMIN — Medication 2: at 09:03

## 2021-12-01 RX ADMIN — Medication 1 PACKET(S): at 11:56

## 2021-12-01 RX ADMIN — Medication 12: at 14:00

## 2021-12-01 RX ADMIN — ALBUTEROL 2 PUFF(S): 90 AEROSOL, METERED ORAL at 11:19

## 2021-12-01 RX ADMIN — Medication 4 MILLILITER(S): at 13:20

## 2021-12-01 RX ADMIN — APIXABAN 5 MILLIGRAM(S): 2.5 TABLET, FILM COATED ORAL at 18:30

## 2021-12-01 RX ADMIN — Medication 2: at 18:27

## 2021-12-01 RX ADMIN — PANTOPRAZOLE SODIUM 40 MILLIGRAM(S): 20 TABLET, DELAYED RELEASE ORAL at 06:23

## 2021-12-01 RX ADMIN — ALBUTEROL 2 PUFF(S): 90 AEROSOL, METERED ORAL at 23:39

## 2021-12-01 RX ADMIN — Medication 600 MILLIGRAM(S): at 06:23

## 2021-12-01 RX ADMIN — CHLORHEXIDINE GLUCONATE 1 APPLICATION(S): 213 SOLUTION TOPICAL at 06:47

## 2021-12-01 RX ADMIN — INSULIN GLARGINE 12 UNIT(S): 100 INJECTION, SOLUTION SUBCUTANEOUS at 23:25

## 2021-12-01 RX ADMIN — MEXILETINE HYDROCHLORIDE 200 MILLIGRAM(S): 150 CAPSULE ORAL at 23:25

## 2021-12-01 NOTE — PROGRESS NOTE ADULT - SUBJECTIVE AND OBJECTIVE BOX
Bozena UlyssesLorenzoRadha | PGY-1  Internal Medicine  Pager: [phone] NS/ [phone] [facility]    OVERNIGHT EVENTS: no overnight events      SUBJECTIVE: Patient was examined at bedside this morning. Appeared comfortable. Overnight vitals and monitoring results were reviewed. Reporting no complaints. Denied chest pain, SOB, abdominal pain, vomiting, diarrhea, constipation.       MEDICATIONS  (STANDING):  ALBUTerol    90 MICROgram(s) HFA Inhaler 2 Puff(s) Inhalation every 6 hours  apixaban 5 milliGRAM(s) Oral every 12 hours  budesonide 160 MICROgram(s)/formoterol 4.5 MICROgram(s) Inhaler 2 Puff(s) Inhalation two times a day  cefepime   IVPB 1000 milliGRAM(s) IV Intermittent every 8 hours  cefepime   IVPB      chlorhexidine 4% Liquid 1 Application(s) Topical <User Schedule>  dextrose 40% Gel 15 Gram(s) Oral once  dextrose 5%. 1000 milliLiter(s) (50 mL/Hr) IV Continuous <Continuous>  dextrose 5%. 1000 milliLiter(s) (100 mL/Hr) IV Continuous <Continuous>  dextrose 5%. 1000 milliLiter(s) (100 mL/Hr) IV Continuous <Continuous>  dextrose 50% Injectable 25 Gram(s) IV Push once  dextrose 50% Injectable 12.5 Gram(s) IV Push once  dextrose 50% Injectable 25 Gram(s) IV Push once  diltiazem    Tablet 60 milliGRAM(s) Oral every 12 hours  glucagon  Injectable 1 milliGRAM(s) IntraMuscular once  glucagon  Injectable 1 milliGRAM(s) IntraMuscular once  guaiFENesin  milliGRAM(s) Oral every 12 hours  insulin glargine Injectable (LANTUS) 10 Unit(s) SubCutaneous at bedtime  insulin lispro (ADMELOG) corrective regimen sliding scale   SubCutaneous three times a day before meals  insulin lispro (ADMELOG) corrective regimen sliding scale   SubCutaneous at bedtime  methylPREDNISolone 4 milliGRAM(s) Oral daily  mexiletine 200 milliGRAM(s) Oral three times a day  montelukast 10 milliGRAM(s) Oral daily  pantoprazole    Tablet 40 milliGRAM(s) Oral before breakfast  polyethylene glycol 3350 17 Gram(s) Oral daily  tiotropium 18 MICROgram(s) Capsule 1 Capsule(s) Inhalation daily    MEDICATIONS  (PRN):  acetaminophen     Tablet .. 650 milliGRAM(s) Oral every 6 hours PRN Temp greater or equal to 38C (100.4F), Mild Pain (1 - 3)  aluminum hydroxide/magnesium hydroxide/simethicone Suspension 30 milliLiter(s) Oral every 4 hours PRN Dyspepsia  benzonatate 100 milliGRAM(s) Oral three times a day PRN Cough  melatonin 3 milliGRAM(s) Oral at bedtime PRN Insomnia  metoclopramide Injectable 10 milliGRAM(s) IV Push every 8 hours PRN nausea        T(F): 98.1 (12-01-21 @ 05:36), Max: 98.9 (11-30-21 @ 20:29)  HR: 62 (12-01-21 @ 05:36) (62 - 92)  BP: 146/55 (12-01-21 @ 05:36) (109/67 - 146/55)  BP(mean): --  RR: 18 (12-01-21 @ 05:36) (18 - 18)  SpO2: 98% (12-01-21 @ 05:36) (92% - 99%)    PHYSICAL EXAM:     GENERAL: NAD, lying in bed comfortably  HEAD:  Atraumatic, Normocephalic  EYES:  R eye injected, L eye false eye  CHEST/LUNG: on 2L NC. Crackles b/l lower lobes.   HEART: Regular rate and rhythm; No murmurs, rubs, or gallops, normal S1/S2  ABDOMEN: normal bowel sounds; Soft, nontender, nondistended, no organomegaly.  + colostomy   EXTREMITIES:  2+ Peripheral Pulses, brisk capillary refill. No clubbing, cyanosis, or edema  MSK: No gross deformities noted   Neurological:  A&Ox3, no focal deficits   TELEMETRY:    LABS:                        10.8   11.79 )-----------( 220      ( 01 Dec 2021 07:10 )             34.9     12-01    140  |  101  |  12  ----------------------------<  231<H>  4.0   |  27  |  0.65    Ca    8.8      01 Dec 2021 07:15  Phos  2.4     12-01  Mg     2.1     12-01    TPro  6.0  /  Alb  3.3  /  TBili  0.2  /  DBili  x   /  AST  12  /  ALT  11  /  AlkPhos  94  12-01            Creatinine Trend: 0.65<--, 0.70<--, 0.81<--, 0.73<--  I&O's Summary    30 Nov 2021 07:01  -  01 Dec 2021 07:00  --------------------------------------------------------  IN: 0 mL / OUT: 750 mL / NET: -750 mL      BNP    RADIOLOGY & ADDITIONAL STUDIES:             Bozena Ashly | PGY-1  Internal Medicine  Pager: [phone] NS/ [phone] [facility]    OVERNIGHT EVENTS: no overnight events      SUBJECTIVE: Patient was examined at bedside this morning. Appeared comfortable. Overnight vitals and monitoring results were reviewed. Reported an episode of chest tightness in AM. Denied chest pain, SOB, abdominal pain, vomiting, diarrhea, constipation.       MEDICATIONS  (STANDING):  ALBUTerol    90 MICROgram(s) HFA Inhaler 2 Puff(s) Inhalation every 6 hours  apixaban 5 milliGRAM(s) Oral every 12 hours  budesonide 160 MICROgram(s)/formoterol 4.5 MICROgram(s) Inhaler 2 Puff(s) Inhalation two times a day  cefepime   IVPB 1000 milliGRAM(s) IV Intermittent every 8 hours  cefepime   IVPB      chlorhexidine 4% Liquid 1 Application(s) Topical <User Schedule>  dextrose 40% Gel 15 Gram(s) Oral once  dextrose 5%. 1000 milliLiter(s) (50 mL/Hr) IV Continuous <Continuous>  dextrose 5%. 1000 milliLiter(s) (100 mL/Hr) IV Continuous <Continuous>  dextrose 5%. 1000 milliLiter(s) (100 mL/Hr) IV Continuous <Continuous>  dextrose 50% Injectable 25 Gram(s) IV Push once  dextrose 50% Injectable 12.5 Gram(s) IV Push once  dextrose 50% Injectable 25 Gram(s) IV Push once  diltiazem    Tablet 60 milliGRAM(s) Oral every 12 hours  glucagon  Injectable 1 milliGRAM(s) IntraMuscular once  glucagon  Injectable 1 milliGRAM(s) IntraMuscular once  guaiFENesin  milliGRAM(s) Oral every 12 hours  insulin glargine Injectable (LANTUS) 10 Unit(s) SubCutaneous at bedtime  insulin lispro (ADMELOG) corrective regimen sliding scale   SubCutaneous three times a day before meals  insulin lispro (ADMELOG) corrective regimen sliding scale   SubCutaneous at bedtime  methylPREDNISolone 4 milliGRAM(s) Oral daily  mexiletine 200 milliGRAM(s) Oral three times a day  montelukast 10 milliGRAM(s) Oral daily  pantoprazole    Tablet 40 milliGRAM(s) Oral before breakfast  polyethylene glycol 3350 17 Gram(s) Oral daily  tiotropium 18 MICROgram(s) Capsule 1 Capsule(s) Inhalation daily    MEDICATIONS  (PRN):  acetaminophen     Tablet .. 650 milliGRAM(s) Oral every 6 hours PRN Temp greater or equal to 38C (100.4F), Mild Pain (1 - 3)  aluminum hydroxide/magnesium hydroxide/simethicone Suspension 30 milliLiter(s) Oral every 4 hours PRN Dyspepsia  benzonatate 100 milliGRAM(s) Oral three times a day PRN Cough  melatonin 3 milliGRAM(s) Oral at bedtime PRN Insomnia  metoclopramide Injectable 10 milliGRAM(s) IV Push every 8 hours PRN nausea        T(F): 98.1 (12-01-21 @ 05:36), Max: 98.9 (11-30-21 @ 20:29)  HR: 62 (12-01-21 @ 05:36) (62 - 92)  BP: 146/55 (12-01-21 @ 05:36) (109/67 - 146/55)  BP(mean): --  RR: 18 (12-01-21 @ 05:36) (18 - 18)  SpO2: 98% (12-01-21 @ 05:36) (92% - 99%)    PHYSICAL EXAM:     GENERAL: NAD, lying in bed comfortably  HEAD:  Atraumatic, Normocephalic  EYES:  R eye injected, L eye false eye  CHEST/LUNG: on 2L NC. Crackles b/l lower lobes.   HEART: Regular rate and rhythm; No murmurs, rubs, or gallops, normal S1/S2  ABDOMEN: normal bowel sounds; Soft, nontender, nondistended, no organomegaly.  + colostomy   EXTREMITIES:  2+ Peripheral Pulses, brisk capillary refill. No clubbing, cyanosis, or edema  MSK: No gross deformities noted   Neurological:  A&Ox3, no focal deficits   TELEMETRY:    LABS:                        10.8   11.79 )-----------( 220      ( 01 Dec 2021 07:10 )             34.9     12-01    140  |  101  |  12  ----------------------------<  231<H>  4.0   |  27  |  0.65    Ca    8.8      01 Dec 2021 07:15  Phos  2.4     12-01  Mg     2.1     12-01    TPro  6.0  /  Alb  3.3  /  TBili  0.2  /  DBili  x   /  AST  12  /  ALT  11  /  AlkPhos  94  12-01            Creatinine Trend: 0.65<--, 0.70<--, 0.81<--, 0.73<--  I&O's Summary    30 Nov 2021 07:01  -  01 Dec 2021 07:00  --------------------------------------------------------  IN: 0 mL / OUT: 750 mL / NET: -750 mL      BNP    RADIOLOGY & ADDITIONAL STUDIES:

## 2021-12-01 NOTE — PROGRESS NOTE ADULT - ATTENDING COMMENTS
-EKG's repeated no obvious apparent significant changes noted. CE's this am normal.   -C/w cefepime pending cultures. Patient responding clinically. ID and pulm recs appreciated.   -Chest vest and mucinex and mucormyst added.   -UA only very small blood. Outpatient follow up.   -Insulin adjustments for better control. -EKG's repeated no obvious apparent significant changes noted. CE's this am normal.   -C/w cefepime pending cultures. Patient responding clinically. ID and pulm recs appreciated.   -Chest vest and mucinex and mucormyst added.   -UA only very small blood. Outpatient follow up.   -Insulin adjustments for better control.  -Wean off O2.

## 2021-12-01 NOTE — PROGRESS NOTE ADULT - PROBLEM SELECTOR PLAN 1
#chest discomfort and SOB  Echo 11/2021 - no significant changes from previous echo. Mild diastolic dysfunction w/ normal LV . Hx of pseudomonas in Feb 2020.  Received suraj 1g in ED.  - f/u blood and sputum cx, urine legionella   - CT chest showed lingula consolidation, LLL opacity PNA vs atelectasis, impacted distal bronchioles, 9mm RLL nodule needs f/u in 3 months   - c/w cefipime (11/29- )  - ID consult #chest discomfort and SOB  Echo 11/2021 - no significant changes from previous echo. Mild diastolic dysfunction w/ normal LV . Hx of pseudomonas in Feb 2020.  Received suraj 1g in ED.  - f/u blood and sputum cx, urine legionella   - CT chest showed lingula consolidation, LLL opacity PNA vs atelectasis, impacted distal bronchioles, 9mm RLL nodule needs f/u in 3 months   - c/w cefipime (11/29- )  - ID consult  - wean O2

## 2021-12-01 NOTE — PROGRESS NOTE ADULT - SUBJECTIVE AND OBJECTIVE BOX
CHIEF COMPLAINT: f/up TBM, bronchiectasis, severe persistent asthma, CB, PNA-some spasmodic cough, and tight chestness    Interval Events: ID [email or web address]    REVIEW OF SYSTEMS:  Constitutional: No fevers or chills. No weight loss. + fatigue or generalized malaise.  Eyes: No itching or discharge from the eyes  ENT: No ear pain. No ear discharge. No nasal congestion. No post nasal drip. No epistaxis. No throat pain. No sore throat. No difficulty swallowing.   CV: No chest pain. No palpitations. No lightheadedness or dizziness.   Resp: No dyspnea at rest. + dyspnea on exertion. No orthopnea. No wheezing. + cough. No stridor. + sputum production. No chest pain with respiration.  GI: No nausea. No vomiting. No diarrhea.  MSK: No joint pain or pain in any extremities  Integumentary: No skin lesions. No pedal edema.  Neurological: No gross motor weakness. No sensory changes.  [+ ] All other systems negative  [ ] Unable to assess ROS because ________    OBJECTIVE:  ICU Vital Signs Last 24 Hrs  T(C): 36.7 (01 Dec 2021 05:36), Max: 37.2 (2021 20:29)  T(F): 98.1 (01 Dec 2021 05:36), Max: 98.9 (2021 20:29)  HR: 62 (01 Dec 2021 05:36) (62 - 92)  BP: 146/55 (01 Dec 2021 05:36) (109/67 - 146/55)  BP(mean): --  ABP: --  ABP(mean): --  RR: 18 (01 Dec 2021 05:36) (18 - 18)  SpO2: 98% (01 Dec 2021 05:36) (92% - 98%)         @ 07:  -   @ 07:00  --------------------------------------------------------  IN: 0 mL / OUT: 600 mL / NET: -600 mL     @ 07:01  -   @ 05:44  --------------------------------------------------------  IN: 0 mL / OUT: 750 mL / NET: -750 mL      CAPILLARY BLOOD GLUCOSE      POCT Blood Glucose.: 240 mg/dL (2021 21:41)      PHYSICAL EXAM: NAD in bed on NC  General: Awake, alert, oriented X 3.   HEENT: Atraumatic, normocephalic.                 Mallampatti Grade 2                No nasal congestion.                No tonsillar or pharyngeal exudates.  Lymph Nodes: No palpable lymphadenopathy  Neck: No JVD. No carotid bruit.   Respiratory: Normal chest expansion                         Normal percussion                         Normal and equal air entry                         mild exp wheeze and rhonchi but no rales.  Cardiovascular: S1 S2 normal. + murmurs, rubs or gallops.   Abdomen: Soft, non-tender, non-distended. No organomegaly. Normoactive bowel sounds.  Extremities: Warm to touch. Peripheral pulse palpable. No pedal edema.   Skin: No rashes or skin lesions  Neurological: Motor and sensory examination equal and normal in all four extremities.  Psychiatry: Appropriate mood and affect.    HOSPITAL MEDICATIONS:  MEDICATIONS  (STANDING):  ALBUTerol    90 MICROgram(s) HFA Inhaler 2 Puff(s) Inhalation every 6 hours  apixaban 5 milliGRAM(s) Oral every 12 hours  budesonide 160 MICROgram(s)/formoterol 4.5 MICROgram(s) Inhaler 2 Puff(s) Inhalation two times a day  cefepime   IVPB 1000 milliGRAM(s) IV Intermittent every 8 hours  cefepime   IVPB      chlorhexidine 4% Liquid 1 Application(s) Topical <User Schedule>  dextrose 40% Gel 15 Gram(s) Oral once  dextrose 5%. 1000 milliLiter(s) (50 mL/Hr) IV Continuous <Continuous>  dextrose 5%. 1000 milliLiter(s) (100 mL/Hr) IV Continuous <Continuous>  dextrose 50% Injectable 25 Gram(s) IV Push once  dextrose 50% Injectable 12.5 Gram(s) IV Push once  dextrose 50% Injectable 25 Gram(s) IV Push once  diltiazem    Tablet 60 milliGRAM(s) Oral every 12 hours  glucagon  Injectable 1 milliGRAM(s) IntraMuscular once  guaiFENesin  milliGRAM(s) Oral every 12 hours  insulin lispro (ADMELOG) corrective regimen sliding scale   SubCutaneous three times a day before meals  insulin lispro (ADMELOG) corrective regimen sliding scale   SubCutaneous at bedtime  methylPREDNISolone 4 milliGRAM(s) Oral daily  mexiletine 200 milliGRAM(s) Oral three times a day  montelukast 10 milliGRAM(s) Oral daily  pantoprazole    Tablet 40 milliGRAM(s) Oral before breakfast  polyethylene glycol 3350 17 Gram(s) Oral daily  tiotropium 18 MICROgram(s) Capsule 1 Capsule(s) Inhalation daily    MEDICATIONS  (PRN):  acetaminophen     Tablet .. 650 milliGRAM(s) Oral every 6 hours PRN Temp greater or equal to 38C (100.4F), Mild Pain (1 - 3)  aluminum hydroxide/magnesium hydroxide/simethicone Suspension 30 milliLiter(s) Oral every 4 hours PRN Dyspepsia  benzonatate 100 milliGRAM(s) Oral three times a day PRN Cough  melatonin 3 milliGRAM(s) Oral at bedtime PRN Insomnia  metoclopramide Injectable 10 milliGRAM(s) IV Push every 8 hours PRN nausea      LABS:                        11.2   16.86 )-----------( 223      ( 2021 07:06 )             36.1         137  |  102  |  9   ----------------------------<  151<H>  4.1   |  25  |  0.70    Ca    8.5      2021 07:04  Phos  2.2       Mg     1.9         TPro  6.0  /  Alb  3.3  /  TBili  0.3  /  DBili  x   /  AST  11  /  ALT  10  /  AlkPhos  100        Urinalysis Basic - ( 2021 18:35 )    Color: Yellow / Appearance: Clear / S.031 / pH: x  Gluc: x / Ketone: Negative  / Bili: Negative / Urobili: Negative   Blood: x / Protein: 30 mg/dL / Nitrite: Negative   Leuk Esterase: Negative / RBC: 3 /hpf / WBC 1 /HPF   Sq Epi: x / Non Sq Epi: 0 /hpf / Bacteria: Negative            MICROBIOLOGY:     RADIOLOGY:  [ ] Reviewed and interpreted by me    Point of Care Ultrasound Findings:    PFT:    EKG:

## 2021-12-01 NOTE — PROGRESS NOTE ADULT - TIME BILLING
as above: ID f/up-await final sputum results to de-esculate rx  multifactorial dyspnea-TBM, CB, severe persistent asthma, PNA, debility, anemia, CAD--O2 NC sat above 90%  PNA-f/up sputum cx--cefepime D4 of ?7  (ID formal karenn-RISHABH Liz)  HLQ-AW-qcddpxis/vest rx, incentive spirometry  asthma-medrol 4mg, spiriva/symbicort, duoneb q 6, singulair 10 q hs,  allergy-claritin/flonase  gerd-ppi  abnormal CT-nodule-f/up 3 months                    cards-on mult rx-to continue  PT-OOB    DVT prophylaxis  DC planning    Eulalio Allison MD-Pulmonary   [phone]

## 2021-12-01 NOTE — PROGRESS NOTE ADULT - ASSESSMENT
70 y/o F w/tracheobronchomalacia s/p tracheobronchoplasty, severe persistent asthma, bronchiectasis, and colon cancer s/p colectomy admitted with likely exacerbation of bronchiectasis due to pneumonia.    - Supplemental O2 as needed goal O2 sat >= 90%  - Broad spectrum abx including pseudomonal coverage  - Airway clearance, acapella device, bronchodilators, chest PT  - Continue home asthma regimen  - Repeat CT scan in 3 months for follow up of new pulmonary nodule  ***************************************  11/30-better over all                                                                                    SEE Below:  12/1-better but still sx, ID evaln-await sputum results

## 2021-12-01 NOTE — PROGRESS NOTE ADULT - SUBJECTIVE AND OBJECTIVE BOX
Patient is a 71y old  Female who presents with a chief complaint of chest discomfort (01 Dec 2021 08:34)    Being followed by ID for        Interval history:  No other acute events      ROS:  No cough,SOB,CP  No N/V/D  No abd pain  No urinary complaints  No HA  No joint or limb pain  No other complaints    PAST MEDICAL & SURGICAL HISTORY:  Atrial fibrillation  paroxysmal, on eliquis    Diabetes  Type 2    COPD (chronic obstructive pulmonary disease)    Adrenal insufficiency  Medrol daily for over 50 years    Aortic insufficiency  moderate AR on echo 5/3/2018    Pelvic fracture    Asthma    Tracheobronchomalacia  diagnosed , s/p bronchial thermoplasty  (Dr Zapien); recent bronchoscopy 2018 revealed no evidence of tracheobronchomalacia in trachea or bronchial tubes    Colorectal cancer  2018- last treatment , chemo and radiation    Rectal bleeding    Seizure  x 1 18    DVT (deep venous thrombosis)  15-20 years ago, took coumadin    TIA (transient ischemic attack)  multiple, last 5 years ago - presents as right-sided weakness    History of partial hysterectomy  30 years ago - fibroids    H/O total knee replacement, bilateral  5 years ago    History of sinus surgery  multiple sinus surgeries    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    H/O pelvic surgery  5 years ago - s/p fracture    History of tracheomalacia   - attempted tracheal stenting ([facility])- course complicated by obstruction, respiratory failure, multiple CPR attempts -  stent discontinued; 10/20/2016 Tracheobronchoplasty (Prolene Mesh) performed at [facility] by Dr Zapien    S/P bronchoscopy  2018 - [place] (Dr Zapien) no evidence of tracheobronchomalacia in trachea or bronchial tubes    Rectal bleeding  exam under anesthesia ([facility]) 2018      Allergies    ampicillin (Short breath)  aspirin (Short breath)  Avelox (Short breath; Pruritus)  codeine (Short breath)  Dilaudid (Short breath)  iodine (Short breath; Swelling)  penicillin (Short breath)  shellfish (Anaphylaxis)  tetanus toxoid (Short breath)  Valium (Short breath)    Intolerances      Antimicrobials:    cefepime   IVPB 1000 milliGRAM(s) IV Intermittent every 8 hours  cefepime   IVPB        MEDICATIONS  (STANDING):  acetylcysteine 20%  Inhalation 4 milliLiter(s) Inhalation three times a day  ALBUTerol    90 MICROgram(s) HFA Inhaler 2 Puff(s) Inhalation every 6 hours  apixaban 5 milliGRAM(s) Oral every 12 hours  budesonide 160 MICROgram(s)/formoterol 4.5 MICROgram(s) Inhaler 2 Puff(s) Inhalation two times a day  cefepime   IVPB 1000 milliGRAM(s) IV Intermittent every 8 hours  cefepime   IVPB      chlorhexidine 4% Liquid 1 Application(s) Topical <User Schedule>  dextrose 40% Gel 15 Gram(s) Oral once  dextrose 5%. 1000 milliLiter(s) (50 mL/Hr) IV Continuous <Continuous>  dextrose 5%. 1000 milliLiter(s) (100 mL/Hr) IV Continuous <Continuous>  dextrose 5%. 1000 milliLiter(s) (100 mL/Hr) IV Continuous <Continuous>  dextrose 50% Injectable 25 Gram(s) IV Push once  dextrose 50% Injectable 12.5 Gram(s) IV Push once  dextrose 50% Injectable 25 Gram(s) IV Push once  diltiazem    Tablet 60 milliGRAM(s) Oral every 12 hours  glucagon  Injectable 1 milliGRAM(s) IntraMuscular once  glucagon  Injectable 1 milliGRAM(s) IntraMuscular once  guaiFENesin ER 1200 milliGRAM(s) Oral every 12 hours  insulin glargine Injectable (LANTUS) 15 Unit(s) SubCutaneous at bedtime  insulin lispro (ADMELOG) corrective regimen sliding scale   SubCutaneous three times a day before meals  insulin lispro (ADMELOG) corrective regimen sliding scale   SubCutaneous at bedtime  insulin lispro Injectable (ADMELOG) 2 Unit(s) SubCutaneous three times a day before meals  insulin regular  human recombinant 3 Unit(s) SubCutaneous once  methylPREDNISolone 4 milliGRAM(s) Oral daily  mexiletine 200 milliGRAM(s) Oral three times a day  montelukast 10 milliGRAM(s) Oral daily  mupirocin 2% Nasal 1 Application(s) Nasal two times a day  pantoprazole    Tablet 40 milliGRAM(s) Oral before breakfast  polyethylene glycol 3350 17 Gram(s) Oral daily  potassium phosphate / sodium phosphate Powder (PHOS-NaK) 1 Packet(s) Oral every 6 hours  sodium chloride 0.65% Nasal 1 Spray(s) Both Nostrils two times a day  tiotropium 18 MICROgram(s) Capsule 1 Capsule(s) Inhalation daily      Vital Signs Last 24 Hrs  T(C): 36.7 (21 @ 09:00), Max: 37.2 (21 @ 20:29)  T(F): 98 (21 @ 09:00), Max: 98.9 (21 @ 20:29)  HR: 79 (21 @ 13:20) (62 - 80)  BP: 125/66 (21 @ 09:00) (123/69 - 146/55)  BP(mean): --  RR: 18 (21 @ 09:00) (18 - 18)  SpO2: 98% (21 @ 13:20) (97% - 99%)    Physical Exam:    Constitutional well preserved,comfortable,pleasant    HEENT PERRLA EOMI,No pallor or icterus    No oral exudate or erythema    Neck supple no JVD or LN    Chest Good AE,CTA    CVS RRR S1 S2 WNl No murmur or rub or gallop    Abd soft BS normal No tenderness no masses    Ext No cyanosis clubbing or edema    IV site no erythema tenderness or discharge    Joints no swelling or LOM    CNS AAO X 3 no focal    Lab Data:                          10.8   11.79 )-----------( 220      ( 01 Dec 2021 07:10 )             34.9           140  |  101  |  12  ----------------------------<  231<H>  4.0   |  27  |  0.65    Ca    8.8      01 Dec 2021 07:15  Phos  2.4       Mg     2.1         TPro  6.0  /  Alb  3.3  /  TBili  0.2  /  DBili  x   /  AST  12  /  ALT  11  /  AlkPhos  94        Urinalysis Basic - ( 2021 18:35 )    Color: Yellow / Appearance: Clear / S.031 / pH: x  Gluc: x / Ketone: Negative  / Bili: Negative / Urobili: Negative   Blood: x / Protein: 30 mg/dL / Nitrite: Negative   Leuk Esterase: Negative / RBC: 3 /hpf / WBC 1 /HPF   Sq Epi: x / Non Sq Epi: 0 /hpf / Bacteria: Negative        .Sputum Sputum  21   Normal Respiratory Jessica present  --    Moderate polymorphonuclear leukocytes per low power field  No Squamous epithelial cells per low power field  Rare Gram positive cocci in pairs per oil power field      .Blood Blood-Peripheral  21   No growth to date.  --  --                    WBC Count: 11.79 (21 @ 07:10)  WBC Count: 16.86 (21 @ 07:06)  WBC Count: 18.05 (21 @ 06:04)  WBC Count: 12.24 (21 @ 16:31)             Patient is a 71y old  Female who presents with a chief complaint of chest discomfort (01 Dec 2021 08:34)    Being followed by ID for        Interval history:  pt still not feeling right  with mucus production  No other acute events      ROS:  No cough,SOB,CP  No N/V/D  No abd pain  No urinary complaints  No HA  No joint or limb pain  No other complaints    PAST MEDICAL & SURGICAL HISTORY:  Atrial fibrillation  paroxysmal, on eliquis    Diabetes  Type 2    COPD (chronic obstructive pulmonary disease)    Adrenal insufficiency  Medrol daily for over 50 years    Aortic insufficiency  moderate AR on echo 5/3/2018    Pelvic fracture    Asthma    Tracheobronchomalacia  diagnosed , s/p bronchial thermoplasty  (Dr Zapien); recent bronchoscopy 2018 revealed no evidence of tracheobronchomalacia in trachea or bronchial tubes    Colorectal cancer  2018- last treatment , chemo and radiation    Rectal bleeding    Seizure  x 1 18    DVT (deep venous thrombosis)  15-20 years ago, took coumadin    TIA (transient ischemic attack)  multiple, last 5 years ago - presents as right-sided weakness    History of partial hysterectomy  30 years ago - fibroids    H/O total knee replacement, bilateral  5 years ago    History of sinus surgery  multiple sinus surgeries    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    H/O pelvic surgery  5 years ago - s/p fracture    History of tracheomalacia   - attempted tracheal stenting ([facility])- course complicated by obstruction, respiratory failure, multiple CPR attempts -  stent discontinued; 10/20/2016 Tracheobronchoplasty (Prolene Mesh) performed at [facility] by Dr Zapien    S/P bronchoscopy  2018 - [place] (Dr Zapien) no evidence of tracheobronchomalacia in trachea or bronchial tubes    Rectal bleeding  exam under anesthesia ([facility]) 2018      Allergies    ampicillin (Short breath)  aspirin (Short breath)  Avelox (Short breath; Pruritus)  codeine (Short breath)  Dilaudid (Short breath)  iodine (Short breath; Swelling)  penicillin (Short breath)  shellfish (Anaphylaxis)  tetanus toxoid (Short breath)  Valium (Short breath)    Intolerances      Antimicrobials:    cefepime   IVPB 1000 milliGRAM(s) IV Intermittent every 8 hours  cefepime   IVPB        MEDICATIONS  (STANDING):  acetylcysteine 20%  Inhalation 4 milliLiter(s) Inhalation three times a day  ALBUTerol    90 MICROgram(s) HFA Inhaler 2 Puff(s) Inhalation every 6 hours  apixaban 5 milliGRAM(s) Oral every 12 hours  budesonide 160 MICROgram(s)/formoterol 4.5 MICROgram(s) Inhaler 2 Puff(s) Inhalation two times a day  cefepime   IVPB 1000 milliGRAM(s) IV Intermittent every 8 hours  cefepime   IVPB      chlorhexidine 4% Liquid 1 Application(s) Topical <User Schedule>  dextrose 40% Gel 15 Gram(s) Oral once  dextrose 5%. 1000 milliLiter(s) (50 mL/Hr) IV Continuous <Continuous>  dextrose 5%. 1000 milliLiter(s) (100 mL/Hr) IV Continuous <Continuous>  dextrose 5%. 1000 milliLiter(s) (100 mL/Hr) IV Continuous <Continuous>  dextrose 50% Injectable 25 Gram(s) IV Push once  dextrose 50% Injectable 12.5 Gram(s) IV Push once  dextrose 50% Injectable 25 Gram(s) IV Push once  diltiazem    Tablet 60 milliGRAM(s) Oral every 12 hours  glucagon  Injectable 1 milliGRAM(s) IntraMuscular once  glucagon  Injectable 1 milliGRAM(s) IntraMuscular once  guaiFENesin ER 1200 milliGRAM(s) Oral every 12 hours  insulin glargine Injectable (LANTUS) 15 Unit(s) SubCutaneous at bedtime  insulin lispro (ADMELOG) corrective regimen sliding scale   SubCutaneous three times a day before meals  insulin lispro (ADMELOG) corrective regimen sliding scale   SubCutaneous at bedtime  insulin lispro Injectable (ADMELOG) 2 Unit(s) SubCutaneous three times a day before meals  insulin regular  human recombinant 3 Unit(s) SubCutaneous once  methylPREDNISolone 4 milliGRAM(s) Oral daily  mexiletine 200 milliGRAM(s) Oral three times a day  montelukast 10 milliGRAM(s) Oral daily  mupirocin 2% Nasal 1 Application(s) Nasal two times a day  pantoprazole    Tablet 40 milliGRAM(s) Oral before breakfast  polyethylene glycol 3350 17 Gram(s) Oral daily  potassium phosphate / sodium phosphate Powder (PHOS-NaK) 1 Packet(s) Oral every 6 hours  sodium chloride 0.65% Nasal 1 Spray(s) Both Nostrils two times a day  tiotropium 18 MICROgram(s) Capsule 1 Capsule(s) Inhalation daily      Vital Signs Last 24 Hrs  T(C): 36.7 (21 @ 09:00), Max: 37.2 (21 @ 20:29)  T(F): 98 (21 @ 09:00), Max: 98.9 (21 @ 20:29)  HR: 79 (21 @ 13:20) (62 - 80)  BP: 125/66 (21 @ 09:00) (123/69 - 146/55)  BP(mean): --  RR: 18 (21 @ 09:00) (18 - 18)  SpO2: 98% (21 @ 13:20) (97% - 99%)    Physical Exam:    Constitutional well preserved,comfortable,pleasant    HEENT PERRLA EOMI,No pallor or icterus    No oral exudate or erythema    Neck supple no JVD or LN    Chest Good AE,CTA    CVS RRR S1 S2 WNl No murmur or rub or gallop    Abd soft BS normal No tenderness no masses    Ext No cyanosis clubbing or edema    IV site no erythema tenderness or discharge    Joints no swelling or LOM    CNS AAO X 3 no focal    Lab Data:                          10.8   11.79 )-----------( 220      ( 01 Dec 2021 07:10 )             34.9           140  |  101  |  12  ----------------------------<  231<H>  4.0   |  27  |  0.65    Ca    8.8      01 Dec 2021 07:15  Phos  2.4       Mg     2.1         TPro  6.0  /  Alb  3.3  /  TBili  0.2  /  DBili  x   /  AST  12  /  ALT  11  /  AlkPhos  94        Urinalysis Basic - ( 2021 18:35 )    Color: Yellow / Appearance: Clear / S.031 / pH: x  Gluc: x / Ketone: Negative  / Bili: Negative / Urobili: Negative   Blood: x / Protein: 30 mg/dL / Nitrite: Negative   Leuk Esterase: Negative / RBC: 3 /hpf / WBC 1 /HPF   Sq Epi: x / Non Sq Epi: 0 /hpf / Bacteria: Negative        .Sputum Sputum  21   Normal Respiratory Jessica present  --    Moderate polymorphonuclear leukocytes per low power field  No Squamous epithelial cells per low power field  Rare Gram positive cocci in pairs per oil power field      .Blood Blood-Peripheral  21   No growth to date.  --  --                    WBC Count: 11.79 (21 @ 07:10)  WBC Count: 16.86 (21 @ 07:06)  WBC Count: 18.05 (21 @ 06:04)  WBC Count: 12.24 (21 @ 16:31)             Patient is a 71y old  Female who presents with a chief complaint of chest discomfort (01 Dec 2021 08:34)    Being followed by ID for bronchiectasis exacerbation        Interval history:  pt still not feeling right  with mucus production  No other acute events      ROS:  No cough,SOB,CP  No N/V/D  No abd pain  No urinary complaints  No HA  No joint or limb pain  No other complaints    PAST MEDICAL & SURGICAL HISTORY:  Atrial fibrillation  paroxysmal, on eliquis    Diabetes  Type 2    COPD (chronic obstructive pulmonary disease)    Adrenal insufficiency  Medrol daily for over 50 years    Aortic insufficiency  moderate AR on echo 5/3/2018    Pelvic fracture    Asthma    Tracheobronchomalacia  diagnosed , s/p bronchial thermoplasty  (Dr Zapien); recent bronchoscopy 2018 revealed no evidence of tracheobronchomalacia in trachea or bronchial tubes    Colorectal cancer  2018- last treatment , chemo and radiation    Rectal bleeding    Seizure  x 1 18    DVT (deep venous thrombosis)  15-20 years ago, took coumadin    TIA (transient ischemic attack)  multiple, last 5 years ago - presents as right-sided weakness    History of partial hysterectomy  30 years ago - fibroids    H/O total knee replacement, bilateral  5 years ago    History of sinus surgery  multiple sinus surgeries    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    H/O pelvic surgery  5 years ago - s/p fracture    History of tracheomalacia   - attempted tracheal stenting ([facility])- course complicated by obstruction, respiratory failure, multiple CPR attempts -  stent discontinued; 10/20/2016 Tracheobronchoplasty (Prolene Mesh) performed at [facility] by Dr Zapien    S/P bronchoscopy  2018 - [place] (Dr Zapien) no evidence of tracheobronchomalacia in trachea or bronchial tubes    Rectal bleeding  exam under anesthesia ([facility]) 2018      Allergies    ampicillin (Short breath)  aspirin (Short breath)  Avelox (Short breath; Pruritus)  codeine (Short breath)  Dilaudid (Short breath)  iodine (Short breath; Swelling)  penicillin (Short breath)  shellfish (Anaphylaxis)  tetanus toxoid (Short breath)  Valium (Short breath)    Intolerances      Antimicrobials:    cefepime   IVPB 1000 milliGRAM(s) IV Intermittent every 8 hours  cefepime   IVPB        MEDICATIONS  (STANDING):  acetylcysteine 20%  Inhalation 4 milliLiter(s) Inhalation three times a day  ALBUTerol    90 MICROgram(s) HFA Inhaler 2 Puff(s) Inhalation every 6 hours  apixaban 5 milliGRAM(s) Oral every 12 hours  budesonide 160 MICROgram(s)/formoterol 4.5 MICROgram(s) Inhaler 2 Puff(s) Inhalation two times a day  cefepime   IVPB 1000 milliGRAM(s) IV Intermittent every 8 hours  cefepime   IVPB      chlorhexidine 4% Liquid 1 Application(s) Topical <User Schedule>  dextrose 40% Gel 15 Gram(s) Oral once  dextrose 5%. 1000 milliLiter(s) (50 mL/Hr) IV Continuous <Continuous>  dextrose 5%. 1000 milliLiter(s) (100 mL/Hr) IV Continuous <Continuous>  dextrose 5%. 1000 milliLiter(s) (100 mL/Hr) IV Continuous <Continuous>  dextrose 50% Injectable 25 Gram(s) IV Push once  dextrose 50% Injectable 12.5 Gram(s) IV Push once  dextrose 50% Injectable 25 Gram(s) IV Push once  diltiazem    Tablet 60 milliGRAM(s) Oral every 12 hours  glucagon  Injectable 1 milliGRAM(s) IntraMuscular once  glucagon  Injectable 1 milliGRAM(s) IntraMuscular once  guaiFENesin ER 1200 milliGRAM(s) Oral every 12 hours  insulin glargine Injectable (LANTUS) 15 Unit(s) SubCutaneous at bedtime  insulin lispro (ADMELOG) corrective regimen sliding scale   SubCutaneous three times a day before meals  insulin lispro (ADMELOG) corrective regimen sliding scale   SubCutaneous at bedtime  insulin lispro Injectable (ADMELOG) 2 Unit(s) SubCutaneous three times a day before meals  insulin regular  human recombinant 3 Unit(s) SubCutaneous once  methylPREDNISolone 4 milliGRAM(s) Oral daily  mexiletine 200 milliGRAM(s) Oral three times a day  montelukast 10 milliGRAM(s) Oral daily  mupirocin 2% Nasal 1 Application(s) Nasal two times a day  pantoprazole    Tablet 40 milliGRAM(s) Oral before breakfast  polyethylene glycol 3350 17 Gram(s) Oral daily  potassium phosphate / sodium phosphate Powder (PHOS-NaK) 1 Packet(s) Oral every 6 hours  sodium chloride 0.65% Nasal 1 Spray(s) Both Nostrils two times a day  tiotropium 18 MICROgram(s) Capsule 1 Capsule(s) Inhalation daily      Vital Signs Last 24 Hrs  T(C): 36.7 (12-01-21 @ 09:00), Max: 37.2 (21 @ 20:29)  T(F): 98 (21 @ 09:00), Max: 98.9 (21 @ 20:29)  HR: 79 (21 @ 13:20) (62 - 80)  BP: 125/66 (21 @ 09:00) (123/69 - 146/55)  BP(mean): --  RR: 18 (21 @ 09:00) (18 - 18)  SpO2: 98% (21 @ 13:20) (97% - 99%)    Physical Exam:    Constitutional well preserved,comfortable,pleasant    HEENT PERRLA EOMI,No pallor or icterus    No oral exudate or erythema    Neck supple no JVD or LN    Chest Coarse BS bilaterally     CVS  S1 S2 tachy murmur    Abd soft BS normal No tenderness     Ext No cyanosis clubbing or edema    IV site no erythema tenderness or discharge    Joints no swelling or LOM    CNS AAO X 3 no focal    Lab Data:                          10.8   11.79 )-----------( 220      ( 01 Dec 2021 07:10 )             34.9           140  |  101  |  12  ----------------------------<  231<H>  4.0   |  27  |  0.65    Ca    8.8      01 Dec 2021 07:15  Phos  2.4       Mg     2.1         TPro  6.0  /  Alb  3.3  /  TBili  0.2  /  DBili  x   /  AST  12  /  ALT  11  /  AlkPhos  94        Urinalysis Basic - ( 2021 18:35 )    Color: Yellow / Appearance: Clear / S.031 / pH: x  Gluc: x / Ketone: Negative  / Bili: Negative / Urobili: Negative   Blood: x / Protein: 30 mg/dL / Nitrite: Negative   Leuk Esterase: Negative / RBC: 3 /hpf / WBC 1 /HPF   Sq Epi: x / Non Sq Epi: 0 /hpf / Bacteria: Negative        .Sputum Sputum  21   Normal Respiratory Jessica present  --    Moderate polymorphonuclear leukocytes per low power field  No Squamous epithelial cells per low power field  Rare Gram positive cocci in pairs per oil power field      .Blood Blood-Peripheral  21   No growth to date.  --  --                    WBC Count: 11.79 (21 @ 07:10)  WBC Count: 16.86 (21 @ 07:06)  WBC Count: 18.05 (21 @ 06:04)  WBC Count: 12.24 (21 @ 16:31)

## 2021-12-01 NOTE — PROGRESS NOTE ADULT - SUBJECTIVE AND OBJECTIVE BOX
Was notified that pt was tachycardic and had substernal chest pain. At bedside, pt reported she had substernal CP that lasted for several minutes with no SOB or palpitations. Reported that this felt like her usual pain when she gets PVCs/bigeminy and that it went away after several minutes. No radiation to arms or jaw, no worsening on exertion, no SOB/palps at any point. Currently at bedside pt was asymptomatic, eating dinner. AM cardiac enzymes wnl. EKG showed HR 84, , , QTc 416, no ST elevations/depressions or new TWI, biphasic p waves present throughout leads II, III, aVF, V3-V6. Will CTM and f/u.    VITALS:   T(C): 36.7 (12-01-21 @ 09:00), Max: 36.7 (12-01-21 @ 05:36)  HR: 73 (12-01-21 @ 17:05) (62 - 80)  BP: 125/66 (12-01-21 @ 09:00) (125/66 - 146/55)  RR: 18 (12-01-21 @ 09:00) (18 - 18)  SpO2: 96% (12-01-21 @ 17:05) (96% - 99%)    GENERAL: NAD, lying in bed comfortably  HEAD:  Atraumatic, Normocephalic  EYES: EOMI, PERRLA, conjunctiva and sclera clear  ENT: Moist mucous membranes  NECK: Supple, No JVD  CHEST/LUNG: crackles all throughout b/l lung fields, pt coughed up green sputum as she took deep breaths  HEART: Regular rate and rhythm; No murmurs, rubs, or gallops  ABDOMEN: BSx4; Soft, nontender, nondistended  EXTREMITIES:  2+ Peripheral Pulses, brisk capillary refill. No clubbing, cyanosis, or edema  NERVOUS SYSTEM:  A&Ox3, no focal deficits   SKIN: No rashes or lesions  psych: normal behavior, normal affect

## 2021-12-01 NOTE — PROGRESS NOTE ADULT - PROBLEM SELECTOR PLAN 5
#PVCs with bigeminy  c/w mexilitine 200mg TID and diltiazem 60mg q12    # PAF on eliquis  s/p 5mg in ED  currently in sinus on EKG  c/w eliquis #PVCs with bigeminy  c/w mexilitine 200mg TID and diltiazem 60mg q12    # PAF on eliquis  s/p 5mg in ED  c/w eliquis

## 2021-12-01 NOTE — PROGRESS NOTE ADULT - ASSESSMENT
71F with history of Tracheobronchomalasia s/p Tracheobronchoplasty, Bronchiectasis, Severe Allergic Asthma, Adrenal Insuffiencey on chronic steroids,DM2, HTN, Colorectal cancer s/p total colectomy now with colostomy, PAF on Eliquis. Follows w/ infectious disease Dr. Stout outpatient, has been treated multiple times in the past year for bronchiectasis exacerbations w/ sputum cx positive for multiple organisms including Pseudomonas. Now p/w SOB, chest tightness, and productive cough x4-5 days, found to have consolidation on CT scan, admitted w/ PNA and bronchiectasis exacerbation. ID consulted for antibiotic recommendations.     - c/w cefepime (11/28- ) for now, can deescalate based on sputum culture results  - will follow up sputum culture  - likely 7d treatment course  - team to address cardiac sxs, repeat ECG, etc...? monitor     Ashley Liz M.D. ,   Pager [phone]     after 5PM/ weekends [phone]    Assessment and plan discussed with the primary team

## 2021-12-01 NOTE — PROGRESS NOTE ADULT - PROBLEM SELECTOR PLAN 4
#IDDM  Takes lantus 15u qhs, and humalog ISS, victoza, at home  Currently with nausea and poor PO intake,   - ISS #IDDM  Takes lantus 15u qhs, and humalog ISS, victoza, at home  Currently with nausea and poor PO intake,   - ISS, lantus 10u qhs

## 2021-12-02 LAB
ALBUMIN SERPL ELPH-MCNC: 3.2 G/DL — LOW (ref 3.3–5)
ALP SERPL-CCNC: 93 U/L — SIGNIFICANT CHANGE UP (ref 40–120)
ALT FLD-CCNC: 17 U/L — SIGNIFICANT CHANGE UP (ref 10–45)
ANION GAP SERPL CALC-SCNC: 12 MMOL/L — SIGNIFICANT CHANGE UP (ref 5–17)
AST SERPL-CCNC: 17 U/L — SIGNIFICANT CHANGE UP (ref 10–40)
BASOPHILS # BLD AUTO: 0.01 K/UL — SIGNIFICANT CHANGE UP (ref 0–0.2)
BASOPHILS NFR BLD AUTO: 0.1 % — SIGNIFICANT CHANGE UP (ref 0–2)
BILIRUB SERPL-MCNC: 0.2 MG/DL — SIGNIFICANT CHANGE UP (ref 0.2–1.2)
BUN SERPL-MCNC: 12 MG/DL — SIGNIFICANT CHANGE UP (ref 7–23)
CALCIUM SERPL-MCNC: 8.7 MG/DL — SIGNIFICANT CHANGE UP (ref 8.4–10.5)
CHLORIDE SERPL-SCNC: 102 MMOL/L — SIGNIFICANT CHANGE UP (ref 96–108)
CO2 SERPL-SCNC: 27 MMOL/L — SIGNIFICANT CHANGE UP (ref 22–31)
CREAT SERPL-MCNC: 0.58 MG/DL — SIGNIFICANT CHANGE UP (ref 0.5–1.3)
CULTURE RESULTS: SIGNIFICANT CHANGE UP
EOSINOPHIL # BLD AUTO: 0.13 K/UL — SIGNIFICANT CHANGE UP (ref 0–0.5)
EOSINOPHIL NFR BLD AUTO: 1.4 % — SIGNIFICANT CHANGE UP (ref 0–6)
FERRITIN SERPL-MCNC: 95 NG/ML — SIGNIFICANT CHANGE UP (ref 15–150)
GLUCOSE SERPL-MCNC: 136 MG/DL — HIGH (ref 70–99)
GRAM STN FLD: SIGNIFICANT CHANGE UP
HCT VFR BLD CALC: 34.6 % — SIGNIFICANT CHANGE UP (ref 34.5–45)
HGB BLD-MCNC: 10.7 G/DL — LOW (ref 11.5–15.5)
IMM GRANULOCYTES NFR BLD AUTO: 0.5 % — SIGNIFICANT CHANGE UP (ref 0–1.5)
IRON SATN MFR SERPL: 10 % — LOW (ref 14–50)
IRON SATN MFR SERPL: 26 UG/DL — LOW (ref 30–160)
LYMPHOCYTES # BLD AUTO: 1.64 K/UL — SIGNIFICANT CHANGE UP (ref 1–3.3)
LYMPHOCYTES # BLD AUTO: 17.6 % — SIGNIFICANT CHANGE UP (ref 13–44)
MAGNESIUM SERPL-MCNC: 2.2 MG/DL — SIGNIFICANT CHANGE UP (ref 1.6–2.6)
MCHC RBC-ENTMCNC: 23.9 PG — LOW (ref 27–34)
MCHC RBC-ENTMCNC: 30.9 GM/DL — LOW (ref 32–36)
MCV RBC AUTO: 77.2 FL — LOW (ref 80–100)
MONOCYTES # BLD AUTO: 1.12 K/UL — HIGH (ref 0–0.9)
MONOCYTES NFR BLD AUTO: 12 % — SIGNIFICANT CHANGE UP (ref 2–14)
NEUTROPHILS # BLD AUTO: 6.35 K/UL — SIGNIFICANT CHANGE UP (ref 1.8–7.4)
NEUTROPHILS NFR BLD AUTO: 68.4 % — SIGNIFICANT CHANGE UP (ref 43–77)
NRBC # BLD: 0 /100 WBCS — SIGNIFICANT CHANGE UP (ref 0–0)
PHOSPHATE SERPL-MCNC: 2.7 MG/DL — SIGNIFICANT CHANGE UP (ref 2.5–4.5)
PLATELET # BLD AUTO: 239 K/UL — SIGNIFICANT CHANGE UP (ref 150–400)
POTASSIUM SERPL-MCNC: 3.8 MMOL/L — SIGNIFICANT CHANGE UP (ref 3.5–5.3)
POTASSIUM SERPL-SCNC: 3.8 MMOL/L — SIGNIFICANT CHANGE UP (ref 3.5–5.3)
PROT SERPL-MCNC: 6.2 G/DL — SIGNIFICANT CHANGE UP (ref 6–8.3)
RBC # BLD: 4.48 M/UL — SIGNIFICANT CHANGE UP (ref 3.8–5.2)
RBC # FLD: 16 % — HIGH (ref 10.3–14.5)
SODIUM SERPL-SCNC: 141 MMOL/L — SIGNIFICANT CHANGE UP (ref 135–145)
SPECIMEN SOURCE: SIGNIFICANT CHANGE UP
SPECIMEN SOURCE: SIGNIFICANT CHANGE UP
TIBC SERPL-MCNC: 263 UG/DL — SIGNIFICANT CHANGE UP (ref 220–430)
TRANSFERRIN SERPL-MCNC: 194 MG/DL — LOW (ref 200–360)
UIBC SERPL-MCNC: 237 UG/DL — SIGNIFICANT CHANGE UP (ref 110–370)
WBC # BLD: 9.3 K/UL — SIGNIFICANT CHANGE UP (ref 3.8–10.5)
WBC # FLD AUTO: 9.3 K/UL — SIGNIFICANT CHANGE UP (ref 3.8–10.5)

## 2021-12-02 PROCEDURE — 99232 SBSQ HOSP IP/OBS MODERATE 35: CPT | Mod: GC

## 2021-12-02 PROCEDURE — 99232 SBSQ HOSP IP/OBS MODERATE 35: CPT

## 2021-12-02 PROCEDURE — 99223 1ST HOSP IP/OBS HIGH 75: CPT | Mod: GC

## 2021-12-02 RX ORDER — FERROUS SULFATE 325(65) MG
325 TABLET ORAL
Refills: 0 | Status: DISCONTINUED | OUTPATIENT
Start: 2021-12-02 | End: 2021-12-06

## 2021-12-02 RX ORDER — FERROUS SULFATE 325(65) MG
325 TABLET ORAL ONCE
Refills: 0 | Status: COMPLETED | OUTPATIENT
Start: 2021-12-02 | End: 2021-12-02

## 2021-12-02 RX ORDER — SENNA PLUS 8.6 MG/1
2 TABLET ORAL AT BEDTIME
Refills: 0 | Status: DISCONTINUED | OUTPATIENT
Start: 2021-12-02 | End: 2021-12-06

## 2021-12-02 RX ORDER — PETROLATUM,WHITE
1 JELLY (GRAM) TOPICAL THREE TIMES A DAY
Refills: 0 | Status: DISCONTINUED | OUTPATIENT
Start: 2021-12-02 | End: 2021-12-06

## 2021-12-02 RX ORDER — POTASSIUM CHLORIDE 20 MEQ
20 PACKET (EA) ORAL
Refills: 0 | Status: COMPLETED | OUTPATIENT
Start: 2021-12-02 | End: 2021-12-02

## 2021-12-02 RX ORDER — INSULIN GLARGINE 100 [IU]/ML
12 INJECTION, SOLUTION SUBCUTANEOUS AT BEDTIME
Refills: 0 | Status: DISCONTINUED | OUTPATIENT
Start: 2021-12-02 | End: 2021-12-06

## 2021-12-02 RX ADMIN — MUPIROCIN 1 APPLICATION(S): 20 OINTMENT TOPICAL at 18:29

## 2021-12-02 RX ADMIN — Medication 4 MILLILITER(S): at 22:48

## 2021-12-02 RX ADMIN — Medication 4 MILLILITER(S): at 06:52

## 2021-12-02 RX ADMIN — BUDESONIDE AND FORMOTEROL FUMARATE DIHYDRATE 2 PUFF(S): 160; 4.5 AEROSOL RESPIRATORY (INHALATION) at 17:25

## 2021-12-02 RX ADMIN — Medication 1 APPLICATION(S): at 13:21

## 2021-12-02 RX ADMIN — CEFEPIME 100 MILLIGRAM(S): 1 INJECTION, POWDER, FOR SOLUTION INTRAMUSCULAR; INTRAVENOUS at 05:27

## 2021-12-02 RX ADMIN — Medication 8: at 18:27

## 2021-12-02 RX ADMIN — Medication 2 UNIT(S): at 13:20

## 2021-12-02 RX ADMIN — Medication 2 UNIT(S): at 18:28

## 2021-12-02 RX ADMIN — MEXILETINE HYDROCHLORIDE 200 MILLIGRAM(S): 150 CAPSULE ORAL at 13:22

## 2021-12-02 RX ADMIN — CEFEPIME 100 MILLIGRAM(S): 1 INJECTION, POWDER, FOR SOLUTION INTRAMUSCULAR; INTRAVENOUS at 13:24

## 2021-12-02 RX ADMIN — ALBUTEROL 2 PUFF(S): 90 AEROSOL, METERED ORAL at 12:05

## 2021-12-02 RX ADMIN — POLYETHYLENE GLYCOL 3350 17 GRAM(S): 17 POWDER, FOR SOLUTION ORAL at 10:30

## 2021-12-02 RX ADMIN — MEXILETINE HYDROCHLORIDE 200 MILLIGRAM(S): 150 CAPSULE ORAL at 05:35

## 2021-12-02 RX ADMIN — Medication 1200 MILLIGRAM(S): at 05:28

## 2021-12-02 RX ADMIN — MONTELUKAST 10 MILLIGRAM(S): 4 TABLET, CHEWABLE ORAL at 10:29

## 2021-12-02 RX ADMIN — ALBUTEROL 2 PUFF(S): 90 AEROSOL, METERED ORAL at 17:23

## 2021-12-02 RX ADMIN — Medication 1 SPRAY(S): at 18:30

## 2021-12-02 RX ADMIN — APIXABAN 5 MILLIGRAM(S): 2.5 TABLET, FILM COATED ORAL at 05:27

## 2021-12-02 RX ADMIN — Medication 1 SPRAY(S): at 05:30

## 2021-12-02 RX ADMIN — Medication 4 MILLIGRAM(S): at 05:29

## 2021-12-02 RX ADMIN — Medication 20 MILLIEQUIVALENT(S): at 08:56

## 2021-12-02 RX ADMIN — ALBUTEROL 2 PUFF(S): 90 AEROSOL, METERED ORAL at 06:52

## 2021-12-02 RX ADMIN — Medication 20 MILLIEQUIVALENT(S): at 10:29

## 2021-12-02 RX ADMIN — CHLORHEXIDINE GLUCONATE 1 APPLICATION(S): 213 SOLUTION TOPICAL at 08:17

## 2021-12-02 RX ADMIN — MUPIROCIN 1 APPLICATION(S): 20 OINTMENT TOPICAL at 05:29

## 2021-12-02 RX ADMIN — Medication 1200 MILLIGRAM(S): at 18:30

## 2021-12-02 RX ADMIN — BUDESONIDE AND FORMOTEROL FUMARATE DIHYDRATE 2 PUFF(S): 160; 4.5 AEROSOL RESPIRATORY (INHALATION) at 06:52

## 2021-12-02 RX ADMIN — Medication 60 MILLIGRAM(S): at 18:28

## 2021-12-02 RX ADMIN — APIXABAN 5 MILLIGRAM(S): 2.5 TABLET, FILM COATED ORAL at 18:29

## 2021-12-02 RX ADMIN — Medication 4: at 13:19

## 2021-12-02 RX ADMIN — CEFEPIME 100 MILLIGRAM(S): 1 INJECTION, POWDER, FOR SOLUTION INTRAMUSCULAR; INTRAVENOUS at 22:00

## 2021-12-02 RX ADMIN — Medication 60 MILLIGRAM(S): at 05:28

## 2021-12-02 RX ADMIN — INSULIN GLARGINE 12 UNIT(S): 100 INJECTION, SOLUTION SUBCUTANEOUS at 22:00

## 2021-12-02 RX ADMIN — SENNA PLUS 2 TABLET(S): 8.6 TABLET ORAL at 22:30

## 2021-12-02 RX ADMIN — PANTOPRAZOLE SODIUM 40 MILLIGRAM(S): 20 TABLET, DELAYED RELEASE ORAL at 05:31

## 2021-12-02 RX ADMIN — Medication 1 APPLICATION(S): at 22:01

## 2021-12-02 RX ADMIN — MEXILETINE HYDROCHLORIDE 200 MILLIGRAM(S): 150 CAPSULE ORAL at 22:30

## 2021-12-02 RX ADMIN — Medication 2: at 08:52

## 2021-12-02 RX ADMIN — Medication 325 MILLIGRAM(S): at 12:35

## 2021-12-02 RX ADMIN — Medication 2 UNIT(S): at 08:53

## 2021-12-02 NOTE — PROGRESS NOTE ADULT - TIME BILLING
as above: ID f/up-await final sputum results to de-esculate rx  multifactorial dyspnea-TBM, CB, severe persistent asthma, PNA, debility, anemia, CAD--O2 NC sat above 90%  PNA-f/up sputum cx--cefepime D4 of ?7  (ID formal karenn-RISHABH Liz)  VBM-TX-vlrcdpdh/vest rx, incentive spirometry  asthma-medrol 4mg, spiriva/symbicort, duoneb q 6, singulair 10 q hs,  allergy-claritin/flonase  gerd-ppi  abnormal CT-nodule-f/up 3 months                    cards-on mult rx-to continue  PT-OOB    DVT prophylaxis  DC planning    Eulalio Allison MD-Pulmonary   [phone] as above: ID f/up-await final sputum results to de-esculate rx-? MURF  multifactorial dyspnea-TBM, CB, severe persistent asthma, PNA, debility, anemia, CAD--O2 NC sat above 90%  PNA-f/up sputum cx=MURF--cefepime D5 of ?7  (ID formal luis-RISHABH Liz)  HER-XF-jijgaeby/vest rx, incentive spirometry  asthma-medrol 4mg, spiriva/symbicort, duoneb q 6, singulair 10 q hs,  allergy-claritin/flonase  gerd-ppi  abnormal CT-nodule-f/up 3 months                    cards-on mult rx-to continue  PT-OOB    DVT prophylaxis  DC planning    Eulalio Allison MD-Pulmonary   [phone]

## 2021-12-02 NOTE — PROGRESS NOTE ADULT - PROBLEM SELECTOR PLAN 1
#chest discomfort and SOB  Echo 11/2021 - no significant changes from previous echo. Mild diastolic dysfunction w/ normal LV . Hx of pseudomonas in Feb 2020.  Received suraj 1g in ED.  - f/u blood and sputum cx, urine legionella   - CT chest showed lingula consolidation, LLL opacity PNA vs atelectasis, impacted distal bronchioles, 9mm RLL nodule needs f/u in 3 months   - c/w cefipime (11/29- )  - ID consult  - wean O2 #chest discomfort and SOB  Echo 11/2021 - no significant changes from previous echo. Mild diastolic dysfunction w/ normal LV . Hx of pseudomonas in Feb 2020.  Received suraj 1g in ED.  - f/u blood and sputum cx, urine legionella   - CT chest showed lingula consolidation, LLL opacity PNA vs atelectasis, impacted distal bronchioles, 9mm RLL nodule needs f/u in 3 months   - c/w cefipime (11/29- )  - ID consult  - wean O2  - Cards consult for continued chest discomfort

## 2021-12-02 NOTE — PROGRESS NOTE ADULT - PROBLEM SELECTOR PLAN 5
#PVCs with bigeminy  c/w mexilitine 200mg TID and diltiazem 60mg q12    # PAF on eliquis  s/p 5mg in ED  c/w eliquis

## 2021-12-02 NOTE — PROGRESS NOTE ADULT - PROBLEM SELECTOR PLAN 4
#IDDM  Takes lantus 15u qhs, and humalog ISS, victoza, at home  Currently with nausea and poor PO intake,   - ISS, lantus 10u qhs #IDDM  Takes lantus 15u qhs, and humalog ISS, victoza, at home  Currently with nausea and poor PO intake,   - ISS, lantus 12u qhs

## 2021-12-02 NOTE — PROGRESS NOTE ADULT - ASSESSMENT
72 y/o F w/tracheobronchomalacia s/p tracheobronchoplasty, severe persistent asthma, bronchiectasis, and colon cancer s/p colectomy admitted with likely exacerbation of bronchiectasis due to pneumonia.    - Supplemental O2 as needed goal O2 sat >= 90%  - Broad spectrum abx including pseudomonal coverage  - Airway clearance, acapella device, bronchodilators, chest PT  - Continue home asthma regimen  - Repeat CT scan in 3 months for follow up of new pulmonary nodule  ***************************************  11/30-better over all                                                                                    SEE Below:  12/1-better but still sx, ID evaln-await sputum results  72 y/o F w/tracheobronchomalacia s/p tracheobronchoplasty, severe persistent asthma, bronchiectasis, and colon cancer s/p colectomy admitted with likely exacerbation of bronchiectasis due to pneumonia.    - Supplemental O2 as needed goal O2 sat >= 90%  - Broad spectrum abx including pseudomonal coverage  - Airway clearance, acapella device, bronchodilators, chest PT  - Continue home asthma regimen  - Repeat CT scan in 3 months for follow up of new pulmonary nodule  ***************************************  11/30-better over all                                                                                    SEE Below:  12/1-better but still sx, ID evaln-await sputum results  12/2-better over all-less sx

## 2021-12-02 NOTE — PROGRESS NOTE ADULT - ASSESSMENT
71F with history of Tracheobronchomalasia s/p Tracheobronchoplasty, Bronchiectasis, Severe Allergic Asthma, Adrenal Insuffiencey on chronic steroids,DM2, HTN, Colorectal cancer s/p total colectomy now with colostomy, PAF on Eliquis. Follows w/ infectious disease Dr. Stout outpatient, has been treated multiple times in the past year for bronchiectasis exacerbations w/ sputum cx positive for multiple organisms including Pseudomonas. Now p/w SOB, chest tightness, and productive cough x4-5 days, found to have consolidation on CT scan, admitted w/ PNA and bronchiectasis exacerbation. ID consulted for antibiotic recommendations.     #PNEUMONIA   - c/w cefepime (11/28- )   REPEAT SPUTUM CULTURE SENT  WOULD ALSO CHECK SPUTUM FOR AFB ( ATYPICAL MYCOBACTERIUM) AND FUNGUS  - will follow up sputum culture      #CHEST DISCOMFORT  appreciate cardiac evaluation  Atypical chest tightness - DDx. inclues underlying bronchiectasis vs pna vs restrictive pulmonary process in s/o kyphosis; states chest tightness worsens with deep breath.   Cardiac enzymes wnl,  ECG without acute ST changes, stress echo without signs of ischemic disease Jan 2021   - no evidence of active ischemia.   - Cardiology favors conservative management at this time.    Cardiac MRI (outpatient) Jan 2021 with combined diastolic and LV systolic dysfunction (LV EF 72%) - remains compensated    TTE Nov 2021 with mod-severe AR; no change since 2019   Paroxysmal a.fib - C/w Cardizem 60mg BID & Eliquis 5mg BID   - Rate controlled at this time- TSH wnl     Palpitations attributed to VPC on EP w/u at [facility]  - On mexiletine 200mg TID as per EP;        #HEMOPTYSIS  pulmonary is following  repeat sputum culture pending  check NTM and fungal culture  If worsens will need to consider further intervention and will need to address holding her anticoagulation      Ashley Liz M.D. ,   Pager [phone]     after 5PM/ weekends [phone]    Assessment and plan discussed with the primary team .

## 2021-12-02 NOTE — PROGRESS NOTE ADULT - SUBJECTIVE AND OBJECTIVE BOX
Patient is a 71y old  Female who presents with a chief complaint of chest discomfort (02 Dec 2021 12:04)    Being followed by ID for        Interval history:  pt notes HEMOPTYSIS today  pt also concerned because sputum looks green  pt get tired just walking down the [place]  pt notes additional h/o cleaning out old papers, ( full of dust ,etc,,) shortly before getting sick   No other acute events      PAST MEDICAL & SURGICAL HISTORY:  Atrial fibrillation  paroxysmal, on eliquis    Diabetes  Type 2    COPD (chronic obstructive pulmonary disease)    Adrenal insufficiency  Medrol daily for over 50 years    Aortic insufficiency  moderate AR on echo 5/3/2018    Pelvic fracture    Asthma    Tracheobronchomalacia  diagnosed 2015, s/p bronchial thermoplasty 2016 (Dr Zapien); recent bronchoscopy 6/5/2018 revealed no evidence of tracheobronchomalacia in trachea or bronchial tubes    Colorectal cancer  4/2018- last treatment , chemo and radiation    Rectal bleeding    Seizure  x 1 1/7/18    DVT (deep venous thrombosis)  15-20 years ago, took coumadin    TIA (transient ischemic attack)  multiple, last 5 years ago - presents as right-sided weakness    History of partial hysterectomy  30 years ago - fibroids    H/O total knee replacement, bilateral  5 years ago    History of sinus surgery  multiple sinus surgeries    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    H/O pelvic surgery  5 years ago - s/p fracture    History of tracheomalacia  2015 - attempted tracheal stenting ([facility])- course complicated by obstruction, respiratory failure, multiple CPR attempts -  stent discontinued; 10/20/2016 Tracheobronchoplasty (Prolene Mesh) performed at [facility] by Dr Zaipen    S/P bronchoscopy  6/5/2018 - [place] (Dr Zapien) no evidence of tracheobronchomalacia in trachea or bronchial tubes    Rectal bleeding  exam under anesthesia ([facility]) 2/2018      Allergies    ampicillin (Short breath)  aspirin (Short breath)  Avelox (Short breath; Pruritus)  codeine (Short breath)  Dilaudid (Short breath)  iodine (Short breath; Swelling)  penicillin (Short breath)  shellfish (Anaphylaxis)  tetanus toxoid (Short breath)  Valium (Short breath)    Intolerances      Antimicrobials:    cefepime   IVPB 1000 milliGRAM(s) IV Intermittent every 8 hours  cefepime   IVPB        MEDICATIONS  (STANDING):  acetylcysteine 20%  Inhalation 4 milliLiter(s) Inhalation three times a day  ALBUTerol    90 MICROgram(s) HFA Inhaler 2 Puff(s) Inhalation every 6 hours  apixaban 5 milliGRAM(s) Oral every 12 hours  budesonide 160 MICROgram(s)/formoterol 4.5 MICROgram(s) Inhaler 2 Puff(s) Inhalation two times a day  cefepime   IVPB 1000 milliGRAM(s) IV Intermittent every 8 hours  cefepime   IVPB      chlorhexidine 4% Liquid 1 Application(s) Topical <User Schedule>  dextrose 40% Gel 15 Gram(s) Oral once  dextrose 5%. 1000 milliLiter(s) (50 mL/Hr) IV Continuous <Continuous>  dextrose 5%. 1000 milliLiter(s) (100 mL/Hr) IV Continuous <Continuous>  dextrose 5%. 1000 milliLiter(s) (100 mL/Hr) IV Continuous <Continuous>  dextrose 50% Injectable 25 Gram(s) IV Push once  dextrose 50% Injectable 12.5 Gram(s) IV Push once  dextrose 50% Injectable 25 Gram(s) IV Push once  diltiazem    Tablet 60 milliGRAM(s) Oral every 12 hours  ferrous    sulfate 325 milliGRAM(s) Oral <User Schedule>  glucagon  Injectable 1 milliGRAM(s) IntraMuscular once  glucagon  Injectable 1 milliGRAM(s) IntraMuscular once  guaiFENesin ER 1200 milliGRAM(s) Oral every 12 hours  insulin glargine Injectable (LANTUS) 12 Unit(s) SubCutaneous at bedtime  insulin lispro (ADMELOG) corrective regimen sliding scale   SubCutaneous three times a day before meals  insulin lispro (ADMELOG) corrective regimen sliding scale   SubCutaneous at bedtime  insulin lispro Injectable (ADMELOG) 2 Unit(s) SubCutaneous three times a day before meals  methylPREDNISolone 4 milliGRAM(s) Oral daily  mexiletine 200 milliGRAM(s) Oral three times a day  montelukast 10 milliGRAM(s) Oral daily  mupirocin 2% Nasal 1 Application(s) Nasal two times a day  pantoprazole    Tablet 40 milliGRAM(s) Oral before breakfast  petrolatum white Ointment 1 Application(s) Topical three times a day  polyethylene glycol 3350 17 Gram(s) Oral daily  senna 2 Tablet(s) Oral at bedtime  sodium chloride 0.65% Nasal 1 Spray(s) Both Nostrils two times a day  tiotropium 18 MICROgram(s) Capsule 1 Capsule(s) Inhalation daily      Vital Signs Last 24 Hrs  T(C): 36.8 (12-02-21 @ 13:54), Max: 36.9 (12-02-21 @ 04:49)  T(F): 98.2 (12-02-21 @ 13:54), Max: 98.4 (12-02-21 @ 04:49)  HR: 78 (12-02-21 @ 13:54) (65 - 84)  BP: 139/56 (12-02-21 @ 13:54) (134/68 - 168/67)  BP(mean): --  RR: 18 (12-02-21 @ 13:54) (17 - 18)  SpO2: 96% (12-02-21 @ 13:54) (94% - 98%)    Physical Exam:    Constitutional well preserved,comfortable,pleasant    HEENT PERRLA EOMI,No pallor or icterus    No oral exudate or erythema    Neck supple no JVD or LN    Chest coarse BS R > L  CVS  S1 S2     Abd soft BS normal No tenderness     Ext No cyanosis clubbing or edema    IV site no erythema tenderness or discharge    Joints no swelling or LOM    CNS AAO X 3 no focal    Lab Data:                          10.7   9.30  )-----------( 239      ( 02 Dec 2021 07:31 )             34.6       12-02    141  |  102  |  12  ----------------------------<  136<H>  3.8   |  27  |  0.58    Ca    8.7      02 Dec 2021 07:31  Phos  2.7     12-02  Mg     2.2     12-02    TPro  6.2  /  Alb  3.2<L>  /  TBili  0.2  /  DBili  x   /  AST  17  /  ALT  17  /  AlkPhos  93  12-02          .Sputum Sputum  11-29-21   Normal Respiratory Jessica present  --    Moderate polymorphonuclear leukocytes per low power field  No Squamous epithelial cells per low power field  Rare Gram positive cocci in pairs per oil power field      .Blood Blood-Peripheral  11-28-21   No growth to date.  --  --    WBC Count: 9.30 (12-02-21 @ 07:31)  WBC Count: 11.79 (12-01-21 @ 07:10)  WBC Count: 16.86 (11-30-21 @ 07:06)  WBC Count: 18.05 (11-29-21 @ 06:04)  WBC Count: 12.24 (11-28-21 @ 16:31)    < from: CT Chest No Cont (11.29.21 @ 10:28) >  EXAM:  CT CHEST                            PROCEDURE DATE:  11/29/2021            INTERPRETATION:  CLINICAL INFORMATION: Shortness of breath.    COMPARISON: Chest radiograph 11/28/2021 and CT chest/abdomen/pelvis 7/26/2021.    CONTRAST/COMPLICATIONS:  IV Contrast: NONE  Oral Contrast: NONE  Complications: None reported at time of study completion    PROCEDURE:  CT of the Chest was performed.  Sagittal and coronal reformats were performed.    FINDINGS:    LUNGS AND AIRWAYS: Patent central airways. Consolidation in the lingula, compatible with pneumonia. Medial left lower lobe opacity which may represent atelectasis versus pneumonia. Scattered tree-in-bud opacities involving the right middle, right lower, and left upper lobes, consistent withimpacted distal bronchioles. New 9 mm right lower lobe subpleural nodule (2:88).  PLEURA: No pleural effusion.  MEDIASTINUM AND MARANDA: No lymphadenopathy.  VESSELS: Aortic calcifications.  HEART: Heart size is normal. Trace pericardial effusion. Aortic valve and coronary artery calcifications.  CHEST WALL AND LOWER NECK: Right chest wall infusion port with tip in the SVC.  VISUALIZED UPPER ABDOMEN: Calcified granuloma in the liver. Nonobstructing left renal calculus. Scattered pancreatic cysts, similar to prior.  BONES: Unchanged appearance of the osseous structures. Degenerative changes.    IMPRESSION:  Consolidation in the lingula, compatible with pneumonia.    Left lower lung opacity which may represent atelectasis versus pneumonia.    Scattered tree-in-bud opacities consistent with impacted distal bronchioles.    New 9 mm right lower lobe subpleural nodule, possibly an intrapulmonary lymph node. 3 month follow-up is recommended.          --- End of Report ---      < end of copied text >

## 2021-12-02 NOTE — PROGRESS NOTE ADULT - SUBJECTIVE AND OBJECTIVE BOX
Bozena GoldsteinbuenaLorenzoRadha | PGY-1  Internal Medicine  Pager: [phone] NS/ [phone] [facility]    OVERNIGHT EVENTS: no overnight events      SUBJECTIVE: Patient was examined at bedside this morning. Appeared comfortable. Overnight vitals and monitoring results were reviewed. Reporting no complaints. Denied chest pain, SOB, abdominal pain, vomiting, diarrhea, constipation.       MEDICATIONS  (STANDING):  acetylcysteine 20%  Inhalation 4 milliLiter(s) Inhalation three times a day  ALBUTerol    90 MICROgram(s) HFA Inhaler 2 Puff(s) Inhalation every 6 hours  apixaban 5 milliGRAM(s) Oral every 12 hours  budesonide 160 MICROgram(s)/formoterol 4.5 MICROgram(s) Inhaler 2 Puff(s) Inhalation two times a day  cefepime   IVPB 1000 milliGRAM(s) IV Intermittent every 8 hours  cefepime   IVPB      chlorhexidine 4% Liquid 1 Application(s) Topical <User Schedule>  dextrose 40% Gel 15 Gram(s) Oral once  dextrose 5%. 1000 milliLiter(s) (50 mL/Hr) IV Continuous <Continuous>  dextrose 5%. 1000 milliLiter(s) (100 mL/Hr) IV Continuous <Continuous>  dextrose 5%. 1000 milliLiter(s) (100 mL/Hr) IV Continuous <Continuous>  dextrose 50% Injectable 25 Gram(s) IV Push once  dextrose 50% Injectable 12.5 Gram(s) IV Push once  dextrose 50% Injectable 25 Gram(s) IV Push once  diltiazem    Tablet 60 milliGRAM(s) Oral every 12 hours  glucagon  Injectable 1 milliGRAM(s) IntraMuscular once  glucagon  Injectable 1 milliGRAM(s) IntraMuscular once  guaiFENesin ER 1200 milliGRAM(s) Oral every 12 hours  insulin glargine Injectable (LANTUS) 15 Unit(s) SubCutaneous at bedtime  insulin lispro (ADMELOG) corrective regimen sliding scale   SubCutaneous three times a day before meals  insulin lispro (ADMELOG) corrective regimen sliding scale   SubCutaneous at bedtime  insulin lispro Injectable (ADMELOG) 2 Unit(s) SubCutaneous three times a day before meals  methylPREDNISolone 4 milliGRAM(s) Oral daily  mexiletine 200 milliGRAM(s) Oral three times a day  montelukast 10 milliGRAM(s) Oral daily  mupirocin 2% Nasal 1 Application(s) Nasal two times a day  pantoprazole    Tablet 40 milliGRAM(s) Oral before breakfast  polyethylene glycol 3350 17 Gram(s) Oral daily  sodium chloride 0.65% Nasal 1 Spray(s) Both Nostrils two times a day  tiotropium 18 MICROgram(s) Capsule 1 Capsule(s) Inhalation daily    MEDICATIONS  (PRN):  acetaminophen     Tablet .. 650 milliGRAM(s) Oral every 6 hours PRN Temp greater or equal to 38C (100.4F), Mild Pain (1 - 3)  aluminum hydroxide/magnesium hydroxide/simethicone Suspension 30 milliLiter(s) Oral every 4 hours PRN Dyspepsia  benzonatate 100 milliGRAM(s) Oral three times a day PRN Cough  melatonin 3 milliGRAM(s) Oral at bedtime PRN Insomnia  metoclopramide Injectable 10 milliGRAM(s) IV Push every 8 hours PRN nausea        T(F): 98.4 (12-02-21 @ 04:49), Max: 98.4 (12-02-21 @ 04:49)  HR: 82 (12-02-21 @ 04:49) (69 - 84)  BP: 134/68 (12-02-21 @ 04:49) (125/66 - 168/67)  BP(mean): --  RR: 18 (12-02-21 @ 04:49) (17 - 18)  SpO2: 94% (12-02-21 @ 04:49) (94% - 98%)    PHYSICAL EXAM:     GENERAL: NAD, lying in bed comfortably  HEAD:  Atraumatic, Normocephalic  EYES:  R eye injected, L eye false eye  CHEST/LUNG: on RA. Crackles b/l lower lobes.   HEART: Regular rate and rhythm; No murmurs, rubs, or gallops, normal S1/S2  ABDOMEN: normal bowel sounds; Soft, nontender, nondistended, no organomegaly.  + colostomy   EXTREMITIES:  2+ Peripheral Pulses, brisk capillary refill. No clubbing, cyanosis, or edema  MSK: No gross deformities noted   Neurological:  A&Ox3, no focal deficits     TELEMETRY:    LABS:                        10.8   11.79 )-----------( 220      ( 01 Dec 2021 07:10 )             34.9     12-01    140  |  101  |  12  ----------------------------<  231<H>  4.0   |  27  |  0.65    Ca    8.8      01 Dec 2021 07:15  Phos  2.4     12-01  Mg     2.1     12-01    TPro  6.0  /  Alb  3.3  /  TBili  0.2  /  DBili  x   /  AST  12  /  ALT  11  /  AlkPhos  94  12-01    CARDIAC MARKERS ( 01 Dec 2021 07:15 )  x     / x     / 151 U/L / x     / 1.3 ng/mL          Creatinine Trend: 0.65<--, 0.70<--, 0.81<--, 0.73<--  I&O's Summary    30 Nov 2021 07:01  -  01 Dec 2021 07:00  --------------------------------------------------------  IN: 0 mL / OUT: 750 mL / NET: -750 mL    01 Dec 2021 07:01  -  02 Dec 2021 06:54  --------------------------------------------------------  IN: 480 mL / OUT: 400 mL / NET: 80 mL      BNP    RADIOLOGY & ADDITIONAL STUDIES:             Bozena GoldsteinbuenaLorenzoRadha | PGY-1  Internal Medicine  Pager: [phone] NS/ [id number] [facility]    OVERNIGHT EVENTS: no overnight events      SUBJECTIVE: Patient was examined at bedside this morning. Appeared comfortable. Overnight vitals and monitoring results were reviewed. Reporting no complaints. Denied chest pain, SOB, abdominal pain, vomiting, diarrhea, constipation.       MEDICATIONS  (STANDING):  acetylcysteine 20%  Inhalation 4 milliLiter(s) Inhalation three times a day  ALBUTerol    90 MICROgram(s) HFA Inhaler 2 Puff(s) Inhalation every 6 hours  apixaban 5 milliGRAM(s) Oral every 12 hours  budesonide 160 MICROgram(s)/formoterol 4.5 MICROgram(s) Inhaler 2 Puff(s) Inhalation two times a day  cefepime   IVPB 1000 milliGRAM(s) IV Intermittent every 8 hours  cefepime   IVPB      chlorhexidine 4% Liquid 1 Application(s) Topical <User Schedule>  dextrose 40% Gel 15 Gram(s) Oral once  dextrose 5%. 1000 milliLiter(s) (50 mL/Hr) IV Continuous <Continuous>  dextrose 5%. 1000 milliLiter(s) (100 mL/Hr) IV Continuous <Continuous>  dextrose 5%. 1000 milliLiter(s) (100 mL/Hr) IV Continuous <Continuous>  dextrose 50% Injectable 25 Gram(s) IV Push once  dextrose 50% Injectable 12.5 Gram(s) IV Push once  dextrose 50% Injectable 25 Gram(s) IV Push once  diltiazem    Tablet 60 milliGRAM(s) Oral every 12 hours  glucagon  Injectable 1 milliGRAM(s) IntraMuscular once  glucagon  Injectable 1 milliGRAM(s) IntraMuscular once  guaiFENesin ER 1200 milliGRAM(s) Oral every 12 hours  insulin glargine Injectable (LANTUS) 15 Unit(s) SubCutaneous at bedtime  insulin lispro (ADMELOG) corrective regimen sliding scale   SubCutaneous three times a day before meals  insulin lispro (ADMELOG) corrective regimen sliding scale   SubCutaneous at bedtime  insulin lispro Injectable (ADMELOG) 2 Unit(s) SubCutaneous three times a day before meals  methylPREDNISolone 4 milliGRAM(s) Oral daily  mexiletine 200 milliGRAM(s) Oral three times a day  montelukast 10 milliGRAM(s) Oral daily  mupirocin 2% Nasal 1 Application(s) Nasal two times a day  pantoprazole    Tablet 40 milliGRAM(s) Oral before breakfast  polyethylene glycol 3350 17 Gram(s) Oral daily  sodium chloride 0.65% Nasal 1 Spray(s) Both Nostrils two times a day  tiotropium 18 MICROgram(s) Capsule 1 Capsule(s) Inhalation daily    MEDICATIONS  (PRN):  acetaminophen     Tablet .. 650 milliGRAM(s) Oral every 6 hours PRN Temp greater or equal to 38C (100.4F), Mild Pain (1 - 3)  aluminum hydroxide/magnesium hydroxide/simethicone Suspension 30 milliLiter(s) Oral every 4 hours PRN Dyspepsia  benzonatate 100 milliGRAM(s) Oral three times a day PRN Cough  melatonin 3 milliGRAM(s) Oral at bedtime PRN Insomnia  metoclopramide Injectable 10 milliGRAM(s) IV Push every 8 hours PRN nausea        T(F): 98.4 (12-02-21 @ 04:49), Max: 98.4 (12-02-21 @ 04:49)  HR: 82 (12-02-21 @ 04:49) (69 - 84)  BP: 134/68 (12-02-21 @ 04:49) (125/66 - 168/67)  BP(mean): --  RR: 18 (12-02-21 @ 04:49) (17 - 18)  SpO2: 94% (12-02-21 @ 04:49) (94% - 98%)    PHYSICAL EXAM:     GENERAL: NAD, lying in bed comfortably  HEAD:  Atraumatic, Normocephalic  EYES:  R eye injected, L eye false eye  CHEST/LUNG: on RA. Crackles b/l lower lobes improved from previous exam. Cough on deep inspiration.    HEART: Regular rate and rhythm; No murmurs, rubs, or gallops, normal S1/S2  ABDOMEN: normal bowel sounds; Soft, nontender, nondistended, no organomegaly.  + colostomy   EXTREMITIES:  2+ Peripheral Pulses, brisk capillary refill. No clubbing, cyanosis, or edema  MSK: No gross deformities noted   Neurological:  A&Ox3, no focal deficits     TELEMETRY:    LABS:                        10.8   11.79 )-----------( 220      ( 01 Dec 2021 07:10 )             34.9     12-01    140  |  101  |  12  ----------------------------<  231<H>  4.0   |  27  |  0.65    Ca    8.8      01 Dec 2021 07:15  Phos  2.4     12-01  Mg     2.1     12-01    TPro  6.0  /  Alb  3.3  /  TBili  0.2  /  DBili  x   /  AST  12  /  ALT  11  /  AlkPhos  94  12-01    CARDIAC MARKERS ( 01 Dec 2021 07:15 )  x     / x     / 151 U/L / x     / 1.3 ng/mL          Creatinine Trend: 0.65<--, 0.70<--, 0.81<--, 0.73<--  I&O's Summary    30 Nov 2021 07:01  -  01 Dec 2021 07:00  --------------------------------------------------------  IN: 0 mL / OUT: 750 mL / NET: -750 mL    01 Dec 2021 07:01  -  02 Dec 2021 06:54  --------------------------------------------------------  IN: 480 mL / OUT: 400 mL / NET: 80 mL      BNP    RADIOLOGY & ADDITIONAL STUDIES:             Bozena UlyssesLorenzoRadha | PGY-1  Internal Medicine  Pager: [phone] NS/ [phone] [facility]    OVERNIGHT EVENTS: pt with small nosebleed over night, hemodynamically stable, has not recurred since.      SUBJECTIVE: Patient was examined at bedside this morning. Appeared comfortable. Overnight vitals and monitoring results were reviewed. Reporting no complaints. Denied chest pain, SOB, abdominal pain, vomiting, diarrhea, constipation.       MEDICATIONS  (STANDING):  acetylcysteine 20%  Inhalation 4 milliLiter(s) Inhalation three times a day  ALBUTerol    90 MICROgram(s) HFA Inhaler 2 Puff(s) Inhalation every 6 hours  apixaban 5 milliGRAM(s) Oral every 12 hours  budesonide 160 MICROgram(s)/formoterol 4.5 MICROgram(s) Inhaler 2 Puff(s) Inhalation two times a day  cefepime   IVPB 1000 milliGRAM(s) IV Intermittent every 8 hours  cefepime   IVPB      chlorhexidine 4% Liquid 1 Application(s) Topical <User Schedule>  dextrose 40% Gel 15 Gram(s) Oral once  dextrose 5%. 1000 milliLiter(s) (50 mL/Hr) IV Continuous <Continuous>  dextrose 5%. 1000 milliLiter(s) (100 mL/Hr) IV Continuous <Continuous>  dextrose 5%. 1000 milliLiter(s) (100 mL/Hr) IV Continuous <Continuous>  dextrose 50% Injectable 25 Gram(s) IV Push once  dextrose 50% Injectable 12.5 Gram(s) IV Push once  dextrose 50% Injectable 25 Gram(s) IV Push once  diltiazem    Tablet 60 milliGRAM(s) Oral every 12 hours  glucagon  Injectable 1 milliGRAM(s) IntraMuscular once  glucagon  Injectable 1 milliGRAM(s) IntraMuscular once  guaiFENesin ER 1200 milliGRAM(s) Oral every 12 hours  insulin glargine Injectable (LANTUS) 15 Unit(s) SubCutaneous at bedtime  insulin lispro (ADMELOG) corrective regimen sliding scale   SubCutaneous three times a day before meals  insulin lispro (ADMELOG) corrective regimen sliding scale   SubCutaneous at bedtime  insulin lispro Injectable (ADMELOG) 2 Unit(s) SubCutaneous three times a day before meals  methylPREDNISolone 4 milliGRAM(s) Oral daily  mexiletine 200 milliGRAM(s) Oral three times a day  montelukast 10 milliGRAM(s) Oral daily  mupirocin 2% Nasal 1 Application(s) Nasal two times a day  pantoprazole    Tablet 40 milliGRAM(s) Oral before breakfast  polyethylene glycol 3350 17 Gram(s) Oral daily  sodium chloride 0.65% Nasal 1 Spray(s) Both Nostrils two times a day  tiotropium 18 MICROgram(s) Capsule 1 Capsule(s) Inhalation daily    MEDICATIONS  (PRN):  acetaminophen     Tablet .. 650 milliGRAM(s) Oral every 6 hours PRN Temp greater or equal to 38C (100.4F), Mild Pain (1 - 3)  aluminum hydroxide/magnesium hydroxide/simethicone Suspension 30 milliLiter(s) Oral every 4 hours PRN Dyspepsia  benzonatate 100 milliGRAM(s) Oral three times a day PRN Cough  melatonin 3 milliGRAM(s) Oral at bedtime PRN Insomnia  metoclopramide Injectable 10 milliGRAM(s) IV Push every 8 hours PRN nausea        T(F): 98.4 (12-02-21 @ 04:49), Max: 98.4 (12-02-21 @ 04:49)  HR: 82 (12-02-21 @ 04:49) (69 - 84)  BP: 134/68 (12-02-21 @ 04:49) (125/66 - 168/67)  BP(mean): --  RR: 18 (12-02-21 @ 04:49) (17 - 18)  SpO2: 94% (12-02-21 @ 04:49) (94% - 98%)    PHYSICAL EXAM:     GENERAL: NAD, lying in bed comfortably  HEAD:  Atraumatic, Normocephalic  EYES:  R eye injected, L eye false eye  CHEST/LUNG: on RA. Crackles b/l lower lobes improved from previous exam. Cough on deep inspiration.    HEART: Regular rate and rhythm; No murmurs, rubs, or gallops, normal S1/S2  ABDOMEN: normal bowel sounds; Soft, nontender, nondistended, no organomegaly.  + colostomy   EXTREMITIES:  2+ Peripheral Pulses, brisk capillary refill. No clubbing, cyanosis, or edema  MSK: No gross deformities noted   Neurological:  A&Ox3, no focal deficits     TELEMETRY:    LABS:                        10.8   11.79 )-----------( 220      ( 01 Dec 2021 07:10 )             34.9     12-01    140  |  101  |  12  ----------------------------<  231<H>  4.0   |  27  |  0.65    Ca    8.8      01 Dec 2021 07:15  Phos  2.4     12-01  Mg     2.1     12-01    TPro  6.0  /  Alb  3.3  /  TBili  0.2  /  DBili  x   /  AST  12  /  ALT  11  /  AlkPhos  94  12-01    CARDIAC MARKERS ( 01 Dec 2021 07:15 )  x     / x     / 151 U/L / x     / 1.3 ng/mL          Creatinine Trend: 0.65<--, 0.70<--, 0.81<--, 0.73<--  I&O's Summary    30 Nov 2021 07:01  -  01 Dec 2021 07:00  --------------------------------------------------------  IN: 0 mL / OUT: 750 mL / NET: -750 mL    01 Dec 2021 07:01  -  02 Dec 2021 06:54  --------------------------------------------------------  IN: 480 mL / OUT: 400 mL / NET: 80 mL      BNP    RADIOLOGY & ADDITIONAL STUDIES:

## 2021-12-02 NOTE — CONSULT NOTE ADULT - SUBJECTIVE AND OBJECTIVE BOX
CARDIOLOGY CONSULT INITIAL EVALUATION  RAYRAY RODRIGUEZ  MRN-[id number]    CONSULTING SERVICE:   CONSULT QUESTION:     HPI:  71F with history of Tracheobronchomalasia s/p Tracheobronchoplasty, Bronchiectasis, Severe Allergic Asthma, Adrenal Insuffiencey, Port, DM2, HTN, Colorectal cancer s/p total colectomy now with colostomy, PAF on Eliquis p/w 1 day of chest discomfort associated with SOB. States yesterday evening she noticed gradual onset chest discomfort. She had to reposition herself to help with the sx. She called her MD who told her to go to the hospital. She reports the chest discomfort is 9/10, intermittent, no palliating or exacerbating factors, "tightness" in sensation, and associated with SOB and nausea. States had chronic cough,  has been making sputum. Denies any actual chest pain, abd pain, vomiting, changes in colostomy output. Denies any recent illness, rhinorrhea, HA, LE edema. Felt warm, but temp at home was 97. States has had chest discomfort in the past and at that time ended up being diagnosed with an infection (per chart review, hx of pseudomonas on bronchial cx in Feb 2020, sensitive to zosyn).    In the ED: T 99.3, HR 80, -170s, satting mid 90s on 2L nc. Given zofran, diltiazem 30 x1, eliquis 5mg x1, tylenol 650 x1, and reglan 10, and suraj 1g.   CXR w (29 Nov 2021 00:19)    ROS:  Negative, except as above.    PAST MEDICAL & SURGICAL HISTORY:  Atrial fibrillation  paroxysmal, on eliquis    Diabetes  Type 2    COPD (chronic obstructive pulmonary disease)    Adrenal insufficiency  Medrol daily for over 50 years    Aortic insufficiency  moderate AR on echo 5/3/2018    Pelvic fracture    Asthma    Tracheobronchomalacia  diagnosed 2015, s/p bronchial thermoplasty 2016 (Dr Zapien); recent bronchoscopy 6/5/2018 revealed no evidence of tracheobronchomalacia in trachea or bronchial tubes    Colorectal cancer  4/2018- last treatment , chemo and radiation    Rectal bleeding    Seizure  x 1 1/7/18    DVT (deep venous thrombosis)  15-20 years ago, took coumadin    TIA (transient ischemic attack)  multiple, last 5 years ago - presents as right-sided weakness    History of partial hysterectomy  30 years ago - fibroids    H/O total knee replacement, bilateral  5 years ago    History of sinus surgery  multiple sinus surgeries    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    H/O pelvic surgery  5 years ago - s/p fracture    History of tracheomalacia  2015 - attempted tracheal stenting ([facility])- course complicated by obstruction, respiratory failure, multiple CPR attempts -  stent discontinued; 10/20/2016 Tracheobronchoplasty (Prolene Mesh) performed at [facility] by Dr Zapien    S/P bronchoscopy  6/5/2018 - [facility] (Dr Zapien) no evidence of tracheobronchomalacia in trachea or bronchial tubes    Rectal bleeding  exam under anesthesia ([facility]) 2/2018      Prescriptions:  mexiletine 200 mg oral capsule: 1 cap(s) orally 3 times a day (29 Nov 2021 01:00)  Protonix 40 mg oral delayed release tablet: 1 tab(s) orally once a day - 1/2 hour before breakfast (29 Nov 2021 01:00)    Home Medications:  apixaban 5 mg oral tablet: 1 tab(s) orally every 12 hours (29 Nov 2021 01:00)  Breo Ellipta 200 mcg-25 mcg/inh inhalation powder: 1 puff(s) inhaled once a day (29 Nov 2021 01:00)  Crestor 5 mg oral tablet: 1 tab(s) orally once a day (at bedtime) (29 Nov 2021 01:00)  dilTIAZem 30 mg oral tablet: 2 tab(s) orally every 12 hours (29 Nov 2021 01:00)  HumaLOG 100 units/mL subcutaneous solution: Sliding Scale (29 Nov 2021 01:00)  ipratropium-albuterol 0.5 mg-2.5 mg/3 mLinhalation solution: 3 milliliter(s) inhaled every 6 hours (29 Nov 2021 01:00)  Lantus 100 units/mL subcutaneous solution: 15 unit(s) subcutaneous once (at bedtime) (29 Nov 2021 01:00)  methylPREDNISolone 4 mg oral tablet: 1 tab(s) orally once a day (29 Nov 2021 01:00)  MiraLax oral powder for reconstitution: 17 gram(s) orally once a day (29 Nov 2021 01:00)  Senna 8.6 mg oral tablet: 1 tab(s) orally once a day (at bedtime) (29 Nov 2021 01:00)  Singulair 10 mg oral tablet: 1 tab(s) orally once a day (29 Nov 2021 01:00)  Spiriva 18 mcg inhalation capsule: 1 cap(s) inhaled once a day (29 Nov 2021 01:00)  Victoza 18 mg/3 mL subcutaneous solution: 1.8 milligram(s) subcutaneous once a day (29 Nov 2021 01:00)  Zofran 8 mg oral tablet: 1 tab(s) orally 3 times a day, As Needed (29 Nov 2021 01:00)    MEDICATIONS  (STANDING):  acetylcysteine 20%  Inhalation 4 milliLiter(s) Inhalation three times a day  ALBUTerol    90 MICROgram(s) HFA Inhaler 2 Puff(s) Inhalation every 6 hours  apixaban 5 milliGRAM(s) Oral every 12 hours  budesonide 160 MICROgram(s)/formoterol 4.5 MICROgram(s) Inhaler 2 Puff(s) Inhalation two times a day  cefepime   IVPB 1000 milliGRAM(s) IV Intermittent every 8 hours  cefepime   IVPB      chlorhexidine 4% Liquid 1 Application(s) Topical <User Schedule>  dextrose 40% Gel 15 Gram(s) Oral once  dextrose 5%. 1000 milliLiter(s) (50 mL/Hr) IV Continuous <Continuous>  dextrose 5%. 1000 milliLiter(s) (100 mL/Hr) IV Continuous <Continuous>  dextrose 5%. 1000 milliLiter(s) (100 mL/Hr) IV Continuous <Continuous>  dextrose 50% Injectable 25 Gram(s) IV Push once  dextrose 50% Injectable 12.5 Gram(s) IV Push once  dextrose 50% Injectable 25 Gram(s) IV Push once  diltiazem    Tablet 60 milliGRAM(s) Oral every 12 hours  ferrous    sulfate 325 milliGRAM(s) Oral <User Schedule>  ferrous    sulfate 325 milliGRAM(s) Oral once  glucagon  Injectable 1 milliGRAM(s) IntraMuscular once  glucagon  Injectable 1 milliGRAM(s) IntraMuscular once  guaiFENesin ER 1200 milliGRAM(s) Oral every 12 hours  insulin glargine Injectable (LANTUS) 12 Unit(s) SubCutaneous at bedtime  insulin lispro (ADMELOG) corrective regimen sliding scale   SubCutaneous three times a day before meals  insulin lispro (ADMELOG) corrective regimen sliding scale   SubCutaneous at bedtime  insulin lispro Injectable (ADMELOG) 2 Unit(s) SubCutaneous three times a day before meals  methylPREDNISolone 4 milliGRAM(s) Oral daily  mexiletine 200 milliGRAM(s) Oral three times a day  montelukast 10 milliGRAM(s) Oral daily  mupirocin 2% Nasal 1 Application(s) Nasal two times a day  pantoprazole    Tablet 40 milliGRAM(s) Oral before breakfast  petrolatum white Ointment 1 Application(s) Topical three times a day  polyethylene glycol 3350 17 Gram(s) Oral daily  senna 2 Tablet(s) Oral at bedtime  sodium chloride 0.65% Nasal 1 Spray(s) Both Nostrils two times a day  tiotropium 18 MICROgram(s) Capsule 1 Capsule(s) Inhalation daily    MEDICATIONS  (PRN):  acetaminophen     Tablet .. 650 milliGRAM(s) Oral every 6 hours PRN Temp greater or equal to 38C (100.4F), Mild Pain (1 - 3)  aluminum hydroxide/magnesium hydroxide/simethicone Suspension 30 milliLiter(s) Oral every 4 hours PRN Dyspepsia  benzonatate 100 milliGRAM(s) Oral three times a day PRN Cough  melatonin 3 milliGRAM(s) Oral at bedtime PRN Insomnia  metoclopramide Injectable 10 milliGRAM(s) IV Push every 8 hours PRN nausea    Allergies    ampicillin (Short breath)  aspirin (Short breath)  Avelox (Short breath; Pruritus)  codeine (Short breath)  Dilaudid (Short breath)  iodine (Short breath; Swelling)  penicillin (Short breath)  shellfish (Anaphylaxis)  tetanus toxoid (Short breath)  Valium (Short breath)    Intolerances      FAMILY HISTORY:  Family history of asthma    Family history of breast cancer (Sibling)    Family history of diabetes mellitus type II      Social History:  Lives with her sister  has adult daughter in the area  former     never smoker  denies etoh or illicit drug use  uses walker at baseline, but very active doing gardening (29 Nov 2021 00:19)    P/E:  Adult Advanced Hemodynamics Last 24 Hrs  CVP(mm Hg): --  CVP(cm H2O): --  CO: --  CI: --  PA: --  PA(mean): --  PCWP: --  SVR: --  SVRI: --  PVR: --  PVRI: --  Vital Signs Last 24 Hrs  T(C): 36.9 (02 Dec 2021 04:49), Max: 36.9 (02 Dec 2021 04:49)  T(F): 98.4 (02 Dec 2021 04:49), Max: 98.4 (02 Dec 2021 04:49)  HR: 65 (02 Dec 2021 06:54) (65 - 84)  BP: 134/68 (02 Dec 2021 04:49) (134/68 - 168/67)  BP(mean): --  RR: 18 (02 Dec 2021 04:49) (17 - 18)  SpO2: 96% (02 Dec 2021 06:54) (94% - 98%)  Daily     Daily   I&O's Detail    01 Dec 2021 07:01  -  02 Dec 2021 07:00  --------------------------------------------------------  IN:    IV PiggyBack: 100 mL    Oral Fluid: 480 mL  Total IN: 580 mL    OUT:    Voided (mL): 800 mL  Total OUT: 800 mL    Total NET: -220 mL      02 Dec 2021 07:01  -  02 Dec 2021 12:05  --------------------------------------------------------  IN:  Total IN: 0 mL    OUT:    Voided (mL): 200 mL  Total OUT: 200 mL    Total NET: -200 mL        I&O's Summary    01 Dec 2021 07:01  -  02 Dec 2021 07:00  --------------------------------------------------------  IN: 580 mL / OUT: 800 mL / NET: -220 mL    02 Dec 2021 07:01  -  02 Dec 2021 12:05  --------------------------------------------------------  IN: 0 mL / OUT: 200 mL / NET: -200 mL      - gen: well appearing, laying in hospital bed, NAD  - HEENT: MMM, NCAT  - heart: RRR, nml S1/S2, no RMG  - lungs: CTABL, nml WOB  - abd: soft, NTND, NABS  - ext: WWP, PPP, no MARY    RELEVANT RECENT LABS/IMAGING/STUDIES:                        10.7   9.30  )-----------( 239      ( 02 Dec 2021 07:31 )             34.6     12-02    141  |  102  |  12  ----------------------------<  136<H>  3.8   |  27  |  0.58    Ca    8.7      02 Dec 2021 07:31  Phos  2.7     12-02  Mg     2.2     12-02    TPro  6.2  /  Alb  3.2<L>  /  TBili  0.2  /  DBili  x   /  AST  17  /  ALT  17  /  AlkPhos  93  12-02    LIVER FUNCTIONS - ( 02 Dec 2021 07:31 )  Alb: 3.2 g/dL / Pro: 6.2 g/dL / ALK PHOS: 93 U/L / ALT: 17 U/L / AST: 17 U/L / GGT: x             --------------------------------------    CARDIAC MARKERS ( 02 Dec 2021 07:31 )  x     / x     / 125 U/L / x     / 1.4 ng/mL  CARDIAC MARKERS ( 01 Dec 2021 07:15 )  x     / x     / 151 U/L / x     / 1.3 ng/mL      --------------------------------------    Culture - Sputum (collected 29 Nov 2021 22:10)  Source: .Sputum Sputum  Gram Stain (30 Nov 2021 06:38):    Moderate polymorphonuclear leukocytes per low power field    No Squamous epithelial cells per low power field    Rare Gram positive cocci in pairs per oil power field  Final Report (02 Dec 2021 11:26):    Normal Respiratory Jessica present      RELEVANT REMOTE DATA:   CARDIOLOGY CONSULT INITIAL EVALUATION  RAYRAY RODRIGUEZ  MRN-[id number]    CONSULTING SERVICE:   CONSULT QUESTION:     HPI:  71F with history of Tracheobronchomalasia s/p Tracheobronchoplasty, Bronchiectasis, Severe Allergic Asthma, Adrenal Insuffiencey, Port, DM2, HTN, Colorectal cancer s/p total colectomy now with colostomy, PAF on Eliquis p/w 1 day of chest discomfort associated with SOB. States yesterday evening she noticed gradual onset chest discomfort. She had to reposition herself to help with the sx. She called her MD who told her to go to the hospital. She reports the chest discomfort is 9/10, intermittent, no palliating or exacerbating factors, "tightness" in sensation, and associated with SOB and nausea. States had chronic cough,  has been making sputum. Denies any actual chest pain, abd pain, vomiting, changes in colostomy output. Denies any recent illness, rhinorrhea, HA, LE edema. Felt warm, but temp at home was 97. States has had chest discomfort in the past and at that time ended up being diagnosed with an infection (per chart review, hx of pseudomonas on bronchial cx in Feb 2020, sensitive to zosyn).    Pt with refractory chest tightness despite abx tx for pna and cardiology consulted for further cardiac risk stratification.     In the ED: T 99.3, HR 80, -170s, satting mid 90s on 2L nc. Given zofran, diltiazem 30 x1, eliquis 5mg x1, tylenol 650 x1, and reglan 10, and suraj 1g.   CXR w (29 Nov 2021 00:19)    ROS:  Negative, except as above.    PAST MEDICAL & SURGICAL HISTORY:  Atrial fibrillation  paroxysmal, on eliquis    Diabetes  Type 2    COPD (chronic obstructive pulmonary disease)    Adrenal insufficiency  Medrol daily for over 50 years    Aortic insufficiency  moderate AR on echo 5/3/2018    Pelvic fracture    Asthma    Tracheobronchomalacia  diagnosed 2015, s/p bronchial thermoplasty 2016 (Dr Zaipen); recent bronchoscopy 6/5/2018 revealed no evidence of tracheobronchomalacia in trachea or bronchial tubes    Colorectal cancer  4/2018- last treatment , chemo and radiation    Rectal bleeding    Seizure  x 1 1/7/18    DVT (deep venous thrombosis)  15-20 years ago, took coumadin    TIA (transient ischemic attack)  multiple, last 5 years ago - presents as right-sided weakness    History of partial hysterectomy  30 years ago - fibroids    H/O total knee replacement, bilateral  5 years ago    History of sinus surgery  multiple sinus surgeries    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    H/O pelvic surgery  5 years ago - s/p fracture    History of tracheomalacia  2015 - attempted tracheal stenting ([facility] course complicated by obstruction, respiratory failure, multiple CPR attempts -  stent discontinued; 10/20/2016 Tracheobronchoplasty (Prolene Mesh) performed at [facility] by Dr Zapien    S/P bronchoscopy  6/5/2018 - [facility] (Dr Zapien) no evidence of tracheobronchomalacia in trachea or bronchial tubes    Rectal bleeding  exam under anesthesia ([facility]) 2/2018      Prescriptions:  mexiletine 200 mg oral capsule: 1 cap(s) orally 3 times a day (29 Nov 2021 01:00)  Protonix 40 mg oral delayed release tablet: 1 tab(s) orally once a day - 1/2 hour before breakfast (29 Nov 2021 01:00)    Home Medications:  apixaban 5 mg oral tablet: 1 tab(s) orally every 12 hours (29 Nov 2021 01:00)  Breo Ellipta 200 mcg-25 mcg/inh inhalation powder: 1 puff(s) inhaled once a day (29 Nov 2021 01:00)  Crestor 5 mg oral tablet: 1 tab(s) orally once a day (at bedtime) (29 Nov 2021 01:00)  dilTIAZem 30 mg oral tablet: 2 tab(s) orally every 12 hours (29 Nov 2021 01:00)  HumaLOG 100 units/mL subcutaneous solution: Sliding Scale (29 Nov 2021 01:00)  ipratropium-albuterol 0.5 mg-2.5 mg/3 mLinhalation solution: 3 milliliter(s) inhaled every 6 hours (29 Nov 2021 01:00)  Lantus 100 units/mL subcutaneous solution: 15 unit(s) subcutaneous once (at bedtime) (29 Nov 2021 01:00)  methylPREDNISolone 4 mg oral tablet: 1 tab(s) orally once a day (29 Nov 2021 01:00)  MiraLax oral powder for reconstitution: 17 gram(s) orally once a day (29 Nov 2021 01:00)  Senna 8.6 mg oral tablet: 1 tab(s) orally once a day (at bedtime) (29 Nov 2021 01:00)  Singulair 10 mg oral tablet: 1 tab(s) orally once a day (29 Nov 2021 01:00)  Spiriva 18 mcg inhalation capsule: 1 cap(s) inhaled once a day (29 Nov 2021 01:00)  Victoza 18 mg/3 mL subcutaneous solution: 1.8 milligram(s) subcutaneous once a day (29 Nov 2021 01:00)  Zofran 8 mg oral tablet: 1 tab(s) orally 3 times a day, As Needed (29 Nov 2021 01:00)    MEDICATIONS  (STANDING):  acetylcysteine 20%  Inhalation 4 milliLiter(s) Inhalation three times a day  ALBUTerol    90 MICROgram(s) HFA Inhaler 2 Puff(s) Inhalation every 6 hours  apixaban 5 milliGRAM(s) Oral every 12 hours  budesonide 160 MICROgram(s)/formoterol 4.5 MICROgram(s) Inhaler 2 Puff(s) Inhalation two times a day  cefepime   IVPB 1000 milliGRAM(s) IV Intermittent every 8 hours  cefepime   IVPB      chlorhexidine 4% Liquid 1 Application(s) Topical <User Schedule>  dextrose 40% Gel 15 Gram(s) Oral once  dextrose 5%. 1000 milliLiter(s) (50 mL/Hr) IV Continuous <Continuous>  dextrose 5%. 1000 milliLiter(s) (100 mL/Hr) IV Continuous <Continuous>  dextrose 5%. 1000 milliLiter(s) (100 mL/Hr) IV Continuous <Continuous>  dextrose 50% Injectable 25 Gram(s) IV Push once  dextrose 50% Injectable 12.5 Gram(s) IV Push once  dextrose 50% Injectable 25 Gram(s) IV Push once  diltiazem    Tablet 60 milliGRAM(s) Oral every 12 hours  ferrous    sulfate 325 milliGRAM(s) Oral <User Schedule>  ferrous    sulfate 325 milliGRAM(s) Oral once  glucagon  Injectable 1 milliGRAM(s) IntraMuscular once  glucagon  Injectable 1 milliGRAM(s) IntraMuscular once  guaiFENesin ER 1200 milliGRAM(s) Oral every 12 hours  insulin glargine Injectable (LANTUS) 12 Unit(s) SubCutaneous at bedtime  insulin lispro (ADMELOG) corrective regimen sliding scale   SubCutaneous three times a day before meals  insulin lispro (ADMELOG) corrective regimen sliding scale   SubCutaneous at bedtime  insulin lispro Injectable (ADMELOG) 2 Unit(s) SubCutaneous three times a day before meals  methylPREDNISolone 4 milliGRAM(s) Oral daily  mexiletine 200 milliGRAM(s) Oral three times a day  montelukast 10 milliGRAM(s) Oral daily  mupirocin 2% Nasal 1 Application(s) Nasal two times a day  pantoprazole    Tablet 40 milliGRAM(s) Oral before breakfast  petrolatum white Ointment 1 Application(s) Topical three times a day  polyethylene glycol 3350 17 Gram(s) Oral daily  senna 2 Tablet(s) Oral at bedtime  sodium chloride 0.65% Nasal 1 Spray(s) Both Nostrils two times a day  tiotropium 18 MICROgram(s) Capsule 1 Capsule(s) Inhalation daily    MEDICATIONS  (PRN):  acetaminophen     Tablet .. 650 milliGRAM(s) Oral every 6 hours PRN Temp greater or equal to 38C (100.4F), Mild Pain (1 - 3)  aluminum hydroxide/magnesium hydroxide/simethicone Suspension 30 milliLiter(s) Oral every 4 hours PRN Dyspepsia  benzonatate 100 milliGRAM(s) Oral three times a day PRN Cough  melatonin 3 milliGRAM(s) Oral at bedtime PRN Insomnia  metoclopramide Injectable 10 milliGRAM(s) IV Push every 8 hours PRN nausea    Allergies    ampicillin (Short breath)  aspirin (Short breath)  Avelox (Short breath; Pruritus)  codeine (Short breath)  Dilaudid (Short breath)  iodine (Short breath; Swelling)  penicillin (Short breath)  shellfish (Anaphylaxis)  tetanus toxoid (Short breath)  Valium (Short breath)    Intolerances      FAMILY HISTORY:  Family history of asthma    Family history of breast cancer (Sibling)    Family history of diabetes mellitus type II      Social History:  Lives with her sister  has adult daughter in the area  former     never smoker  denies etoh or illicit drug use  uses walker at baseline, but very active doing gardening (29 Nov 2021 00:19)    P/E:  Adult Advanced Hemodynamics Last 24 Hrs  CVP(mm Hg): --  CVP(cm H2O): --  CO: --  CI: --  PA: --  PA(mean): --  PCWP: --  SVR: --  SVRI: --  PVR: --  PVRI: --  Vital Signs Last 24 Hrs  T(C): 36.9 (02 Dec 2021 04:49), Max: 36.9 (02 Dec 2021 04:49)  T(F): 98.4 (02 Dec 2021 04:49), Max: 98.4 (02 Dec 2021 04:49)  HR: 65 (02 Dec 2021 06:54) (65 - 84)  BP: 134/68 (02 Dec 2021 04:49) (134/68 - 168/67)  BP(mean): --  RR: 18 (02 Dec 2021 04:49) (17 - 18)  SpO2: 96% (02 Dec 2021 06:54) (94% - 98%)  Daily     Daily   I&O's Detail    01 Dec 2021 07:01  -  02 Dec 2021 07:00  --------------------------------------------------------  IN:    IV PiggyBack: 100 mL    Oral Fluid: 480 mL  Total IN: 580 mL    OUT:    Voided (mL): 800 mL  Total OUT: 800 mL    Total NET: -220 mL      02 Dec 2021 07:01  -  02 Dec 2021 12:05  --------------------------------------------------------  IN:  Total IN: 0 mL    OUT:    Voided (mL): 200 mL  Total OUT: 200 mL    Total NET: -200 mL        I&O's Summary    01 Dec 2021 07:01  -  02 Dec 2021 07:00  --------------------------------------------------------  IN: 580 mL / OUT: 800 mL / NET: -220 mL    02 Dec 2021 07:01  -  02 Dec 2021 12:05  --------------------------------------------------------  IN: 0 mL / OUT: 200 mL / NET: -200 mL      - gen: well appearing, laying in hospital bed, NAD  - HEENT: MMM, NCAT  - heart: RRR, nml S1/S2, no RMG  - lungs: CTABL, nml WOB  - abd: soft, NTND, NABS  - ext: WWP, PPP, no MARY    RELEVANT RECENT LABS/IMAGING/STUDIES:                        10.7   9.30  )-----------( 239      ( 02 Dec 2021 07:31 )             34.6     12-02    141  |  102  |  12  ----------------------------<  136<H>  3.8   |  27  |  0.58    Ca    8.7      02 Dec 2021 07:31  Phos  2.7     12-02  Mg     2.2     12-02    TPro  6.2  /  Alb  3.2<L>  /  TBili  0.2  /  DBili  x   /  AST  17  /  ALT  17  /  AlkPhos  93  12-02    LIVER FUNCTIONS - ( 02 Dec 2021 07:31 )  Alb: 3.2 g/dL / Pro: 6.2 g/dL / ALK PHOS: 93 U/L / ALT: 17 U/L / AST: 17 U/L / GGT: x             --------------------------------------    CARDIAC MARKERS ( 02 Dec 2021 07:31 )  x     / x     / 125 U/L / x     / 1.4 ng/mL  CARDIAC MARKERS ( 01 Dec 2021 07:15 )  x     / x     / 151 U/L / x     / 1.3 ng/mL      --------------------------------------    Culture - Sputum (collected 29 Nov 2021 22:10)  Source: .Sputum Sputum  Gram Stain (30 Nov 2021 06:38):    Moderate polymorphonuclear leukocytes per low power field    No Squamous epithelial cells per low power field    Rare Gram positive cocci in pairs per oil power field  Final Report (02 Dec 2021 11:26):    Normal Respiratory Jessica present      RELEVANT REMOTE DATA:   CARDIOLOGY CONSULT INITIAL EVALUATION  RAYRAY RODRIGUEZ  MRN-[id number]    CONSULTING SERVICE:   CONSULT QUESTION:     HPI:  71F with history of Tracheobronchomalasia s/p Tracheobronchoplasty, Bronchiectasis, Severe Allergic Asthma, Adrenal Insuffiencey, Port, DM2, HTN, Colorectal cancer s/p total colectomy now with colostomy, PAF on Eliquis p/w 1 day of chest discomfort associated with SOB. States yesterday evening she noticed gradual onset chest discomfort. She had to reposition herself to help with the sx. She called her MD who told her to go to the hospital. She reports the chest discomfort is 9/10, intermittent, no palliating or exacerbating factors, "tightness" in sensation, and associated with SOB and nausea. States had chronic cough,  has been making sputum. Denies any actual chest pain, abd pain, vomiting, changes in colostomy output. Denies any recent illness, rhinorrhea, HA, LE edema. Felt warm, but temp at home was 97. States has had chest discomfort in the past and at that time ended up being diagnosed with an infection (per chart review, hx of pseudomonas on bronchial cx in Feb 2020, sensitive to zosyn).    Pt with refractory chest tightness despite abx tx for pna and cardiology consulted for further cardiac risk stratification.     In the ED: T 99.3, HR 80, -170s, satting mid 90s on 2L nc. Given zofran, diltiazem 30 x1, eliquis 5mg x1, tylenol 650 x1, and reglan 10, and suraj 1g.   CXR w (29 Nov 2021 00:19)    ROS:  Negative, except as above.    PAST MEDICAL & SURGICAL HISTORY:  Atrial fibrillation  paroxysmal, on eliquis    Diabetes  Type 2    COPD (chronic obstructive pulmonary disease)    Adrenal insufficiency  Medrol daily for over 50 years    Aortic insufficiency  moderate AR on echo 5/3/2018    Pelvic fracture    Asthma    Tracheobronchomalacia  diagnosed 2015, s/p bronchial thermoplasty 2016 (Dr Zapien); recent bronchoscopy 6/5/2018 revealed no evidence of tracheobronchomalacia in trachea or bronchial tubes    Colorectal cancer  4/2018- last treatment , chemo and radiation    Rectal bleeding    Seizure  x 1 1/7/18    DVT (deep venous thrombosis)  15-20 years ago, took coumadin    TIA (transient ischemic attack)  multiple, last 5 years ago - presents as right-sided weakness    History of partial hysterectomy  30 years ago - fibroids    H/O total knee replacement, bilateral  5 years ago    History of sinus surgery  multiple sinus surgeries    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    H/O pelvic surgery  5 years ago - s/p fracture    History of tracheomalacia  2015 - attempted tracheal stenting ([facility])- course complicated by obstruction, respiratory failure, multiple CPR attempts -  stent discontinued; 10/20/2016 Tracheobronchoplasty (Prolene Mesh) performed at [facility] by Dr Zapien    S/P bronchoscopy  6/5/2018 - [facility] (Dr Zapien) no evidence of tracheobronchomalacia in trachea or bronchial tubes    Rectal bleeding  exam under anesthesia ([facility]) 2/2018      Prescriptions:  mexiletine 200 mg oral capsule: 1 cap(s) orally 3 times a day (29 Nov 2021 01:00)  Protonix 40 mg oral delayed release tablet: 1 tab(s) orally once a day - 1/2 hour before breakfast (29 Nov 2021 01:00)    Home Medications:  apixaban 5 mg oral tablet: 1 tab(s) orally every 12 hours (29 Nov 2021 01:00)  Breo Ellipta 200 mcg-25 mcg/inh inhalation powder: 1 puff(s) inhaled once a day (29 Nov 2021 01:00)  Crestor 5 mg oral tablet: 1 tab(s) orally once a day (at bedtime) (29 Nov 2021 01:00)  dilTIAZem 30 mg oral tablet: 2 tab(s) orally every 12 hours (29 Nov 2021 01:00)  HumaLOG 100 units/mL subcutaneous solution: Sliding Scale (29 Nov 2021 01:00)  ipratropium-albuterol 0.5 mg-2.5 mg/3 mLinhalation solution: 3 milliliter(s) inhaled every 6 hours (29 Nov 2021 01:00)  Lantus 100 units/mL subcutaneous solution: 15 unit(s) subcutaneous once (at bedtime) (29 Nov 2021 01:00)  methylPREDNISolone 4 mg oral tablet: 1 tab(s) orally once a day (29 Nov 2021 01:00)  MiraLax oral powder for reconstitution: 17 gram(s) orally once a day (29 Nov 2021 01:00)  Senna 8.6 mg oral tablet: 1 tab(s) orally once a day (at bedtime) (29 Nov 2021 01:00)  Singulair 10 mg oral tablet: 1 tab(s) orally once a day (29 Nov 2021 01:00)  Spiriva 18 mcg inhalation capsule: 1 cap(s) inhaled once a day (29 Nov 2021 01:00)  Victoza 18 mg/3 mL subcutaneous solution: 1.8 milligram(s) subcutaneous once a day (29 Nov 2021 01:00)  Zofran 8 mg oral tablet: 1 tab(s) orally 3 times a day, As Needed (29 Nov 2021 01:00)    MEDICATIONS  (STANDING):  acetylcysteine 20%  Inhalation 4 milliLiter(s) Inhalation three times a day  ALBUTerol    90 MICROgram(s) HFA Inhaler 2 Puff(s) Inhalation every 6 hours  apixaban 5 milliGRAM(s) Oral every 12 hours  budesonide 160 MICROgram(s)/formoterol 4.5 MICROgram(s) Inhaler 2 Puff(s) Inhalation two times a day  cefepime   IVPB 1000 milliGRAM(s) IV Intermittent every 8 hours  cefepime   IVPB      chlorhexidine 4% Liquid 1 Application(s) Topical <User Schedule>  dextrose 40% Gel 15 Gram(s) Oral once  dextrose 5%. 1000 milliLiter(s) (50 mL/Hr) IV Continuous <Continuous>  dextrose 5%. 1000 milliLiter(s) (100 mL/Hr) IV Continuous <Continuous>  dextrose 5%. 1000 milliLiter(s) (100 mL/Hr) IV Continuous <Continuous>  dextrose 50% Injectable 25 Gram(s) IV Push once  dextrose 50% Injectable 12.5 Gram(s) IV Push once  dextrose 50% Injectable 25 Gram(s) IV Push once  diltiazem    Tablet 60 milliGRAM(s) Oral every 12 hours  ferrous    sulfate 325 milliGRAM(s) Oral <User Schedule>  ferrous    sulfate 325 milliGRAM(s) Oral once  glucagon  Injectable 1 milliGRAM(s) IntraMuscular once  glucagon  Injectable 1 milliGRAM(s) IntraMuscular once  guaiFENesin ER 1200 milliGRAM(s) Oral every 12 hours  insulin glargine Injectable (LANTUS) 12 Unit(s) SubCutaneous at bedtime  insulin lispro (ADMELOG) corrective regimen sliding scale   SubCutaneous three times a day before meals  insulin lispro (ADMELOG) corrective regimen sliding scale   SubCutaneous at bedtime  insulin lispro Injectable (ADMELOG) 2 Unit(s) SubCutaneous three times a day before meals  methylPREDNISolone 4 milliGRAM(s) Oral daily  mexiletine 200 milliGRAM(s) Oral three times a day  montelukast 10 milliGRAM(s) Oral daily  mupirocin 2% Nasal 1 Application(s) Nasal two times a day  pantoprazole    Tablet 40 milliGRAM(s) Oral before breakfast  petrolatum white Ointment 1 Application(s) Topical three times a day  polyethylene glycol 3350 17 Gram(s) Oral daily  senna 2 Tablet(s) Oral at bedtime  sodium chloride 0.65% Nasal 1 Spray(s) Both Nostrils two times a day  tiotropium 18 MICROgram(s) Capsule 1 Capsule(s) Inhalation daily    MEDICATIONS  (PRN):  acetaminophen     Tablet .. 650 milliGRAM(s) Oral every 6 hours PRN Temp greater or equal to 38C (100.4F), Mild Pain (1 - 3)  aluminum hydroxide/magnesium hydroxide/simethicone Suspension 30 milliLiter(s) Oral every 4 hours PRN Dyspepsia  benzonatate 100 milliGRAM(s) Oral three times a day PRN Cough  melatonin 3 milliGRAM(s) Oral at bedtime PRN Insomnia  metoclopramide Injectable 10 milliGRAM(s) IV Push every 8 hours PRN nausea    Allergies    ampicillin (Short breath)  aspirin (Short breath)  Avelox (Short breath; Pruritus)  codeine (Short breath)  Dilaudid (Short breath)  iodine (Short breath; Swelling)  penicillin (Short breath)  shellfish (Anaphylaxis)  tetanus toxoid (Short breath)  Valium (Short breath)    Intolerances      FAMILY HISTORY:  Family history of asthma    Family history of breast cancer (Sibling)    Family history of diabetes mellitus type II      Social History:  Lives with her sister  has adult daughter in the area  former     never smoker  denies etoh or illicit drug use  uses walker at baseline, but very active doing gardening (29 Nov 2021 00:19)    P/E:  Adult Advanced Hemodynamics Last 24 Hrs  CVP(mm Hg): --  CVP(cm H2O): --  CO: --  CI: --  PA: --  PA(mean): --  PCWP: --  SVR: --  SVRI: --  PVR: --  PVRI: --  Vital Signs Last 24 Hrs  T(C): 36.9 (02 Dec 2021 04:49), Max: 36.9 (02 Dec 2021 04:49)  T(F): 98.4 (02 Dec 2021 04:49), Max: 98.4 (02 Dec 2021 04:49)  HR: 65 (02 Dec 2021 06:54) (65 - 84)  BP: 134/68 (02 Dec 2021 04:49) (134/68 - 168/67)  BP(mean): --  RR: 18 (02 Dec 2021 04:49) (17 - 18)  SpO2: 96% (02 Dec 2021 06:54) (94% - 98%)  Daily     Daily   I&O's Detail    01 Dec 2021 07:01  -  02 Dec 2021 07:00  --------------------------------------------------------  IN:    IV PiggyBack: 100 mL    Oral Fluid: 480 mL  Total IN: 580 mL    OUT:    Voided (mL): 800 mL  Total OUT: 800 mL    Total NET: -220 mL      02 Dec 2021 07:01  -  02 Dec 2021 12:05  --------------------------------------------------------  IN:  Total IN: 0 mL    OUT:    Voided (mL): 200 mL  Total OUT: 200 mL    Total NET: -200 mL        I&O's Summary    01 Dec 2021 07:01  -  02 Dec 2021 07:00  --------------------------------------------------------  IN: 580 mL / OUT: 800 mL / NET: -220 mL    02 Dec 2021 07:01  -  02 Dec 2021 12:05  --------------------------------------------------------  IN: 0 mL / OUT: 200 mL / NET: -200 mL      - gen: well appearing, laying in hospital bed, NAD  - HEENT: MMM, NCAT  - heart: RRR, nml S1/S2, no RMG  - lungs: CTABL, nml WOB  - abd: soft, NTND, NABS  - ext: WWP, PPP, no MARY    RELEVANT RECENT LABS/IMAGING/STUDIES:                        10.7   9.30  )-----------( 239      ( 02 Dec 2021 07:31 )             34.6     12-02    141  |  102  |  12  ----------------------------<  136<H>  3.8   |  27  |  0.58    Ca    8.7      02 Dec 2021 07:31  Phos  2.7     12-02  Mg     2.2     12-02    TPro  6.2  /  Alb  3.2<L>  /  TBili  0.2  /  DBili  x   /  AST  17  /  ALT  17  /  AlkPhos  93  12-02    LIVER FUNCTIONS - ( 02 Dec 2021 07:31 )  Alb: 3.2 g/dL / Pro: 6.2 g/dL / ALK PHOS: 93 U/L / ALT: 17 U/L / AST: 17 U/L / GGT: x             --------------------------------------    CARDIAC MARKERS ( 02 Dec 2021 07:31 )  x     / x     / 125 U/L / x     / 1.4 ng/mL  CARDIAC MARKERS ( 01 Dec 2021 07:15 )  x     / x     / 151 U/L / x     / 1.3 ng/mL      --------------------------------------    Culture - Sputum (collected 29 Nov 2021 22:10)  Source: .Sputum Sputum  Gram Stain (30 Nov 2021 06:38):    Moderate polymorphonuclear leukocytes per low power field    No Squamous epithelial cells per low power field    Rare Gram positive cocci in pairs per oil power field  Final Report (02 Dec 2021 11:26):    Normal Respiratory Jessica present      RELEVANT REMOTE DATA:  November 2021 TTE  CONCLUSIONS:  1. Mitral annular calcification, otherwise normal mitral  valve. Minimal mitral regurgitation.  2. Calcified trileaflet aortic valve with normal opening.  Moderate-severe aortic regurgitation.  3. Mild left atrial enlargement.  4. Normal left ventricular internal dimensions and wall  thicknesses.  5. Endocardium not well visualized; grossly normal left  ventricular systolic function.  6. Mild diastolic dysfunction (Stage I).  7. The right ventricle is not well visualized; grossly  normal right ventricular systolic function.  8. Normal tricuspid valve. Minimal tricuspid regurgitation.  9. Estimated pulmonary artery systolic pressure equals 26  mm Hg, assuming right atrial pressure equals 10  mm Hg,  consistent with normal pulmonary pressures.    January 2021   Stress ECHO    Exercise Hemodynamic Findings:  The patient exercised for 9 min 0 sec. The baseline blood pressure was 170/76 mmHg and the baseline heart rate was 88 bpm. The maximum predicted heart rate was 148bpm. The target heart rate was 126 bpm. The peak blood pressure was 209/71 mmHg and peak heart rate was 123 bpm, representing 83 % of the maximum predicted heart rate. The peak exercise workload was 66.00 Martinez    Exercise Electrocardiographic Findings:  The resting ECG revealed: sinus rhythm without significant ST/T changes. At peak exercise, there were no significant ST segment changes present.    Exercise Echocardiographic Findings:  PRE-EXERCISE:  The ejection fraction was 70-75%. There was moderate aortic regurgitation.    POST-EXERCISE:  Overall left ventricular contractility increased with exercise. Segmental wall motion abnormalities were not seen. With stress, normal augmentation of all left ventricular wall segments noted, end systolic volume decreased and the ejection fraction increased. With stress, the left ventricular ejection fraction was >75%. The echocardiogram is non-diagnostic due to inadequate heart rate-systolic blood pressure product achieved however, no ischemic changes seen at 83 % of maximum heart rate achieved.    Pulmonary Hypertension:  Pre-exercise:  There was inadequate tricuspid regurgitation to estimate pulmonary artery systolic pressure pre-exercise.    Post-exercise:  The peak artery systolic pressurepost-exercise was 41 mmHg.    Pericardium:  No pericardial effusion is seen.      Cardiac MRI   INTERPRETATION:  1.  The left ventricle (LV) is normal in size. There is no evidence of LV hypertrophy. Left ventricular global systolic function is reduced The LV ejection fraction is  72 %.  2.  There is Moderate to Severe aortic insufficiency  3.  The right ventricle (RV) is normal in size. RV global systolic function is Normal  4.  No significant abnormalities of the visualized portions of the great vessels.   CARDIOLOGY CONSULT INITIAL EVALUATION  RAYRAY RODRIGUEZ  MRN-[id number]    CONSULTING SERVICE:   CONSULT QUESTION:     HPI:  71F with history of Tracheobronchomalasia s/p Tracheobronchoplasty, Bronchiectasis, Severe Allergic Asthma, Adrenal Insuffiencey, Port, DM2, HTN, Colorectal cancer s/p total colectomy now with colostomy, PAF on Eliquis p/w 1 day of chest discomfort associated with SOB. States yesterday evening she noticed gradual onset chest discomfort. She had to reposition herself to help with the sx. She called her MD who told her to go to the hospital. She reports the chest discomfort is 9/10, intermittent, no palliating or exacerbating factors, "tightness" in sensation, and associated with SOB and nausea. States had chronic cough,  has been making sputum. Denies any actual chest pain, abd pain, vomiting, changes in colostomy output. Denies any recent illness, rhinorrhea, HA, LE edema. Felt warm, but temp at home was 97. States has had chest discomfort in the past and at that time ended up being diagnosed with an infection (per chart review, hx of pseudomonas on bronchial cx in Feb 2020, sensitive to zosyn).    Pt with refractory chest tightness despite abx tx for pna and cardiology consulted for further cardiac risk stratification.     In the ED: T 99.3, HR 80, -170s, satting mid 90s on 2L nc. Given zofran, diltiazem 30 x1, eliquis 5mg x1, tylenol 650 x1, and reglan 10, and suraj 1g.   CXR w (29 Nov 2021 00:19)    ROS:  Negative, except as above.    PAST MEDICAL & SURGICAL HISTORY:  Atrial fibrillation  paroxysmal, on eliquis    Diabetes  Type 2    COPD (chronic obstructive pulmonary disease)    Adrenal insufficiency  Medrol daily for over 50 years    Aortic insufficiency  moderate AR on echo 5/3/2018    Pelvic fracture    Asthma    Tracheobronchomalacia  diagnosed 2015, s/p bronchial thermoplasty 2016 (Dr Zapien); recent bronchoscopy 6/5/2018 revealed no evidence of tracheobronchomalacia in trachea or bronchial tubes    Colorectal cancer  4/2018- last treatment , chemo and radiation    Rectal bleeding    Seizure  x 1 1/7/18    DVT (deep venous thrombosis)  15-20 years ago, took coumadin    TIA (transient ischemic attack)  multiple, last 5 years ago - presents as right-sided weakness    History of partial hysterectomy  30 years ago - fibroids    H/O total knee replacement, bilateral  5 years ago    History of sinus surgery  multiple sinus surgeries    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    H/O pelvic surgery  5 years ago - s/p fracture    History of tracheomalacia  2015 - attempted tracheal stenting ([facility])- course complicated by obstruction, respiratory failure, multiple CPR attempts -  stent discontinued; 10/20/2016 Tracheobronchoplasty (Prolene Mesh) performed at [facility] by Dr Zapien    S/P bronchoscopy  6/5/2018 - [facility] (Dr Zapien) no evidence of tracheobronchomalacia in trachea or bronchial tubes    Rectal bleeding  exam under anesthesia ([facility]) 2/2018      Prescriptions:  mexiletine 200 mg oral capsule: 1 cap(s) orally 3 times a day (29 Nov 2021 01:00)  Protonix 40 mg oral delayed release tablet: 1 tab(s) orally once a day - 1/2 hour before breakfast (29 Nov 2021 01:00)    Home Medications:  apixaban 5 mg oral tablet: 1 tab(s) orally every 12 hours (29 Nov 2021 01:00)  Breo Ellipta 200 mcg-25 mcg/inh inhalation powder: 1 puff(s) inhaled once a day (29 Nov 2021 01:00)  Crestor 5 mg oral tablet: 1 tab(s) orally once a day (at bedtime) (29 Nov 2021 01:00)  dilTIAZem 30 mg oral tablet: 2 tab(s) orally every 12 hours (29 Nov 2021 01:00)  HumaLOG 100 units/mL subcutaneous solution: Sliding Scale (29 Nov 2021 01:00)  ipratropium-albuterol 0.5 mg-2.5 mg/3 mLinhalation solution: 3 milliliter(s) inhaled every 6 hours (29 Nov 2021 01:00)  Lantus 100 units/mL subcutaneous solution: 15 unit(s) subcutaneous once (at bedtime) (29 Nov 2021 01:00)  methylPREDNISolone 4 mg oral tablet: 1 tab(s) orally once a day (29 Nov 2021 01:00)  MiraLax oral powder for reconstitution: 17 gram(s) orally once a day (29 Nov 2021 01:00)  Senna 8.6 mg oral tablet: 1 tab(s) orally once a day (at bedtime) (29 Nov 2021 01:00)  Singulair 10 mg oral tablet: 1 tab(s) orally once a day (29 Nov 2021 01:00)  Spiriva 18 mcg inhalation capsule: 1 cap(s) inhaled once a day (29 Nov 2021 01:00)  Victoza 18 mg/3 mL subcutaneous solution: 1.8 milligram(s) subcutaneous once a day (29 Nov 2021 01:00)  Zofran 8 mg oral tablet: 1 tab(s) orally 3 times a day, As Needed (29 Nov 2021 01:00)    MEDICATIONS  (STANDING):  acetylcysteine 20%  Inhalation 4 milliLiter(s) Inhalation three times a day  ALBUTerol    90 MICROgram(s) HFA Inhaler 2 Puff(s) Inhalation every 6 hours  apixaban 5 milliGRAM(s) Oral every 12 hours  budesonide 160 MICROgram(s)/formoterol 4.5 MICROgram(s) Inhaler 2 Puff(s) Inhalation two times a day  cefepime   IVPB 1000 milliGRAM(s) IV Intermittent every 8 hours  cefepime   IVPB      chlorhexidine 4% Liquid 1 Application(s) Topical <User Schedule>  dextrose 40% Gel 15 Gram(s) Oral once  dextrose 5%. 1000 milliLiter(s) (50 mL/Hr) IV Continuous <Continuous>  dextrose 5%. 1000 milliLiter(s) (100 mL/Hr) IV Continuous <Continuous>  dextrose 5%. 1000 milliLiter(s) (100 mL/Hr) IV Continuous <Continuous>  dextrose 50% Injectable 25 Gram(s) IV Push once  dextrose 50% Injectable 12.5 Gram(s) IV Push once  dextrose 50% Injectable 25 Gram(s) IV Push once  diltiazem    Tablet 60 milliGRAM(s) Oral every 12 hours  ferrous    sulfate 325 milliGRAM(s) Oral <User Schedule>  ferrous    sulfate 325 milliGRAM(s) Oral once  glucagon  Injectable 1 milliGRAM(s) IntraMuscular once  glucagon  Injectable 1 milliGRAM(s) IntraMuscular once  guaiFENesin ER 1200 milliGRAM(s) Oral every 12 hours  insulin glargine Injectable (LANTUS) 12 Unit(s) SubCutaneous at bedtime  insulin lispro (ADMELOG) corrective regimen sliding scale   SubCutaneous three times a day before meals  insulin lispro (ADMELOG) corrective regimen sliding scale   SubCutaneous at bedtime  insulin lispro Injectable (ADMELOG) 2 Unit(s) SubCutaneous three times a day before meals  methylPREDNISolone 4 milliGRAM(s) Oral daily  mexiletine 200 milliGRAM(s) Oral three times a day  montelukast 10 milliGRAM(s) Oral daily  mupirocin 2% Nasal 1 Application(s) Nasal two times a day  pantoprazole    Tablet 40 milliGRAM(s) Oral before breakfast  petrolatum white Ointment 1 Application(s) Topical three times a day  polyethylene glycol 3350 17 Gram(s) Oral daily  senna 2 Tablet(s) Oral at bedtime  sodium chloride 0.65% Nasal 1 Spray(s) Both Nostrils two times a day  tiotropium 18 MICROgram(s) Capsule 1 Capsule(s) Inhalation daily    MEDICATIONS  (PRN):  acetaminophen     Tablet .. 650 milliGRAM(s) Oral every 6 hours PRN Temp greater or equal to 38C (100.4F), Mild Pain (1 - 3)  aluminum hydroxide/magnesium hydroxide/simethicone Suspension 30 milliLiter(s) Oral every 4 hours PRN Dyspepsia  benzonatate 100 milliGRAM(s) Oral three times a day PRN Cough  melatonin 3 milliGRAM(s) Oral at bedtime PRN Insomnia  metoclopramide Injectable 10 milliGRAM(s) IV Push every 8 hours PRN nausea    Allergies    ampicillin (Short breath)  aspirin (Short breath)  Avelox (Short breath; Pruritus)  codeine (Short breath)  Dilaudid (Short breath)  iodine (Short breath; Swelling)  penicillin (Short breath)  shellfish (Anaphylaxis)  tetanus toxoid (Short breath)  Valium (Short breath)    Intolerances      FAMILY HISTORY:  Family history of asthma    Family history of breast cancer (Sibling)    Family history of diabetes mellitus type II      Social History:  Lives with her sister  has adult daughter in the area  former     never smoker  denies etoh or illicit drug use  uses walker at baseline, but very active doing gardening (29 Nov 2021 00:19)    P/E:  Adult Advanced Hemodynamics Last 24 Hrs  CVP(mm Hg): --  CVP(cm H2O): --  CO: --  CI: --  PA: --  PA(mean): --  PCWP: --  SVR: --  SVRI: --  PVR: --  PVRI: --  Vital Signs Last 24 Hrs  T(C): 36.9 (02 Dec 2021 04:49), Max: 36.9 (02 Dec 2021 04:49)  T(F): 98.4 (02 Dec 2021 04:49), Max: 98.4 (02 Dec 2021 04:49)  HR: 65 (02 Dec 2021 06:54) (65 - 84)  BP: 134/68 (02 Dec 2021 04:49) (134/68 - 168/67)  BP(mean): --  RR: 18 (02 Dec 2021 04:49) (17 - 18)  SpO2: 96% (02 Dec 2021 06:54) (94% - 98%)  Daily     Daily   I&O's Detail    01 Dec 2021 07:01  -  02 Dec 2021 07:00  --------------------------------------------------------  IN:    IV PiggyBack: 100 mL    Oral Fluid: 480 mL  Total IN: 580 mL    OUT:    Voided (mL): 800 mL  Total OUT: 800 mL    Total NET: -220 mL      02 Dec 2021 07:01  -  02 Dec 2021 12:05  --------------------------------------------------------  IN:  Total IN: 0 mL    OUT:    Voided (mL): 200 mL  Total OUT: 200 mL    Total NET: -200 mL        I&O's Summary    01 Dec 2021 07:01  -  02 Dec 2021 07:00  --------------------------------------------------------  IN: 580 mL / OUT: 800 mL / NET: -220 mL    02 Dec 2021 07:01  -  02 Dec 2021 12:05  --------------------------------------------------------  IN: 0 mL / OUT: 200 mL / NET: -200 mL      - gen: well appearing elderly female, sitting up in hospital bed, NAD  - HEENT: MMM, NCAT  - heart: RRR, nml S1/S2, no RG, systolic murmur   - lungs: CTABL, nml WOB, very mild wheezes. No crackles.   - abd: soft, NTND, NABS  - ext: WWP, PPP, no MARY. B/l clubbing in fingers   - back: kyphotic     RELEVANT RECENT LABS/IMAGING/STUDIES:                        10.7   9.30  )-----------( 239      ( 02 Dec 2021 07:31 )             34.6     12-02    141  |  102  |  12  ----------------------------<  136<H>  3.8   |  27  |  0.58    Ca    8.7      02 Dec 2021 07:31  Phos  2.7     12-02  Mg     2.2     12-02    TPro  6.2  /  Alb  3.2<L>  /  TBili  0.2  /  DBili  x   /  AST  17  /  ALT  17  /  AlkPhos  93  12-02    LIVER FUNCTIONS - ( 02 Dec 2021 07:31 )  Alb: 3.2 g/dL / Pro: 6.2 g/dL / ALK PHOS: 93 U/L / ALT: 17 U/L / AST: 17 U/L / GGT: x             --------------------------------------    CARDIAC MARKERS ( 02 Dec 2021 07:31 )  x     / x     / 125 U/L / x     / 1.4 ng/mL  CARDIAC MARKERS ( 01 Dec 2021 07:15 )  x     / x     / 151 U/L / x     / 1.3 ng/mL      --------------------------------------    Culture - Sputum (collected 29 Nov 2021 22:10)  Source: .Sputum Sputum  Gram Stain (30 Nov 2021 06:38):    Moderate polymorphonuclear leukocytes per low power field    No Squamous epithelial cells per low power field    Rare Gram positive cocci in pairs per oil power field  Final Report (02 Dec 2021 11:26):    Normal Respiratory Jessica present      RELEVANT REMOTE DATA:  November 2021 TTE  CONCLUSIONS:  1. Mitral annular calcification, otherwise normal mitral  valve. Minimal mitral regurgitation.  2. Calcified trileaflet aortic valve with normal opening.  Moderate-severe aortic regurgitation.  3. Mild left atrial enlargement.  4. Normal left ventricular internal dimensions and wall  thicknesses.  5. Endocardium not well visualized; grossly normal left  ventricular systolic function.  6. Mild diastolic dysfunction (Stage I).  7. The right ventricle is not well visualized; grossly  normal right ventricular systolic function.  8. Normal tricuspid valve. Minimal tricuspid regurgitation.  9. Estimated pulmonary artery systolic pressure equals 26  mm Hg, assuming right atrial pressure equals 10  mm Hg,  consistent with normal pulmonary pressures.    Chest CT November 2021   FINDINGS:    LUNGS AND AIRWAYS: Patent central airways. Consolidation in the lingula, compatible with pneumonia. Medial left lower lobe opacity which may represent atelectasis versus pneumonia. Scattered tree-in-bud opacities involving the right middle, right lower, and left upper lobes, consistent withimpacted distal bronchioles. New 9 mm right lower lobe subpleural nodule (2:88).  PLEURA: No pleural effusion.  MEDIASTINUM AND MARANDA: No lymphadenopathy.  VESSELS: Aortic calcifications.  HEART: Heart size is normal. Trace pericardial effusion. Aortic valve and coronary artery calcifications.  CHEST WALL AND LOWER NECK: Right chest wall infusion port with tip in the SVC.  VISUALIZED UPPER ABDOMEN: Calcified granuloma in the liver. Nonobstructing left renal calculus. Scattered pancreatic cysts, similar to prior.  BONES: Unchanged appearance of the osseous structures. Degenerative changes.    IMPRESSION:  Consolidation in the lingula, compatible with pneumonia.  Left lower lung opacity which may represent atelectasis versus pneumonia.  Scattered tree-in-bud opacities consistent with impacted distal bronchioles.  New 9 mm right lower lobe subpleural nodule, possibly an intrapulmonary lymph node. 3 month follow-up is recommended.      January 2021   Stress ECHO    Exercise Hemodynamic Findings:  The patient exercised for 9 min 0 sec. The baseline blood pressure was 170/76 mmHg and the baseline heart rate was 88 bpm. The maximum predicted heart rate was 148bpm. The target heart rate was 126 bpm. The peak blood pressure was 209/71 mmHg and peak heart rate was 123 bpm, representing 83 % of the maximum predicted heart rate. The peak exercise workload was 66.00 Martinez    Exercise Electrocardiographic Findings:  The resting ECG revealed: sinus rhythm without significant ST/T changes. At peak exercise, there were no significant ST segment changes present.    Exercise Echocardiographic Findings:  PRE-EXERCISE:  The ejection fraction was 70-75%. There was moderate aortic regurgitation.    POST-EXERCISE:  Overall left ventricular contractility increased with exercise. Segmental wall motion abnormalities were not seen. With stress, normal augmentation of all left ventricular wall segments noted, end systolic volume decreased and the ejection fraction increased. With stress, the left ventricular ejection fraction was >75%. The echocardiogram is non-diagnostic due to inadequate heart rate-systolic blood pressure product achieved however, no ischemic changes seen at 83 % of maximum heart rate achieved.    Pulmonary Hypertension:  Pre-exercise:  There was inadequate tricuspid regurgitation to estimate pulmonary artery systolic pressure pre-exercise.    Post-exercise:  The peak artery systolic pressurepost-exercise was 41 mmHg.    Pericardium:  No pericardial effusion is seen.      Cardiac MRI   INTERPRETATION:  1.  The left ventricle (LV) is normal in size. There is no evidence of LV hypertrophy. Left ventricular global systolic function is reduced The LV ejection fraction is  72 %.  2.  There is Moderate to Severe aortic insufficiency  3.  The right ventricle (RV) is normal in size. RV global systolic function is Normal  4.  No significant abnormalities of the visualized portions of the great vessels.   CARDIOLOGY CONSULT INITIAL EVALUATION  RAYRAY RODRIGUEZ  MRN-[id number]    CONSULTING SERVICE: Primary team   CONSULT QUESTION: Chest tightness     HPI:  71F with history of Tracheobronchomalasia s/p Tracheobronchoplasty, Bronchiectasis, Severe Allergic Asthma, Adrenal Insuffiencey, Port, DM2, HTN, Colorectal cancer s/p total colectomy now with colostomy, PAF on Eliquis p/w 1 day of chest discomfort associated with SOB. States yesterday evening she noticed gradual onset chest discomfort. She had to reposition herself to help with the sx. She called her MD who told her to go to the hospital. She reports the chest discomfort is 9/10, intermittent, no palliating or exacerbating factors, "tightness" in sensation, and associated with SOB and nausea. States had chronic cough,  has been making sputum. Denies any actual chest pain, abd pain, vomiting, changes in colostomy output. Denies any recent illness, rhinorrhea, HA, LE edema. Felt warm, but temp at home was 97. States has had chest discomfort in the past and at that time ended up being diagnosed with an infection (per chart review, hx of pseudomonas on bronchial cx in Feb 2020, sensitive to zosyn).    Pt with refractory chest tightness despite abx tx for pna and cardiology consulted for further cardiac risk stratification.     In the ED: T 99.3, HR 80, -170s, satting mid 90s on 2L nc. Given zofran, diltiazem 30 x1, eliquis 5mg x1, tylenol 650 x1, and reglan 10, and suraj 1g.   CXR w (29 Nov 2021 00:19)    ROS:  Negative, except as above.    PAST MEDICAL & SURGICAL HISTORY:  Atrial fibrillation  paroxysmal, on eliquis    Diabetes  Type 2    COPD (chronic obstructive pulmonary disease)    Adrenal insufficiency  Medrol daily for over 50 years    Aortic insufficiency  moderate AR on echo 5/3/2018    Pelvic fracture    Asthma    Tracheobronchomalacia  diagnosed 2015, s/p bronchial thermoplasty 2016 (Dr Zapien); recent bronchoscopy 6/5/2018 revealed no evidence of tracheobronchomalacia in trachea or bronchial tubes    Colorectal cancer  4/2018- last treatment , chemo and radiation    Rectal bleeding    Seizure  x 1 1/7/18    DVT (deep venous thrombosis)  15-20 years ago, took coumadin    TIA (transient ischemic attack)  multiple, last 5 years ago - presents as right-sided weakness    History of partial hysterectomy  30 years ago - fibroids    H/O total knee replacement, bilateral  5 years ago    History of sinus surgery  multiple sinus surgeries    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    H/O pelvic surgery  5 years ago - s/p fracture    History of tracheomalacia  2015 - attempted tracheal stenting ([facility])- course complicated by obstruction, respiratory failure, multiple CPR attempts -  stent discontinued; 10/20/2016 Tracheobronchoplasty (Prolene Mesh) performed at [facility] by Dr Zapien    S/P bronchoscopy  6/5/2018 - [place] (Dr Zapien) no evidence of tracheobronchomalacia in trachea or bronchial tubes    Rectal bleeding  exam under anesthesia ([facility]) 2/2018      Prescriptions:  mexiletine 200 mg oral capsule: 1 cap(s) orally 3 times a day (29 Nov 2021 01:00)  Protonix 40 mg oral delayed release tablet: 1 tab(s) orally once a day - 1/2 hour before breakfast (29 Nov 2021 01:00)    Home Medications:  apixaban 5 mg oral tablet: 1 tab(s) orally every 12 hours (29 Nov 2021 01:00)  Breo Ellipta 200 mcg-25 mcg/inh inhalation powder: 1 puff(s) inhaled once a day (29 Nov 2021 01:00)  Crestor 5 mg oral tablet: 1 tab(s) orally once a day (at bedtime) (29 Nov 2021 01:00)  dilTIAZem 30 mg oral tablet: 2 tab(s) orally every 12 hours (29 Nov 2021 01:00)  HumaLOG 100 units/mL subcutaneous solution: Sliding Scale (29 Nov 2021 01:00)  ipratropium-albuterol 0.5 mg-2.5 mg/3 mLinhalation solution: 3 milliliter(s) inhaled every 6 hours (29 Nov 2021 01:00)  Lantus 100 units/mL subcutaneous solution: 15 unit(s) subcutaneous once (at bedtime) (29 Nov 2021 01:00)  methylPREDNISolone 4 mg oral tablet: 1 tab(s) orally once a day (29 Nov 2021 01:00)  MiraLax oral powder for reconstitution: 17 gram(s) orally once a day (29 Nov 2021 01:00)  Senna 8.6 mg oral tablet: 1 tab(s) orally once a day (at bedtime) (29 Nov 2021 01:00)  Singulair 10 mg oral tablet: 1 tab(s) orally once a day (29 Nov 2021 01:00)  Spiriva 18 mcg inhalation capsule: 1 cap(s) inhaled once a day (29 Nov 2021 01:00)  Victoza 18 mg/3 mL subcutaneous solution: 1.8 milligram(s) subcutaneous once a day (29 Nov 2021 01:00)  Zofran 8 mg oral tablet: 1 tab(s) orally 3 times a day, As Needed (29 Nov 2021 01:00)    MEDICATIONS  (STANDING):  acetylcysteine 20%  Inhalation 4 milliLiter(s) Inhalation three times a day  ALBUTerol    90 MICROgram(s) HFA Inhaler 2 Puff(s) Inhalation every 6 hours  apixaban 5 milliGRAM(s) Oral every 12 hours  budesonide 160 MICROgram(s)/formoterol 4.5 MICROgram(s) Inhaler 2 Puff(s) Inhalation two times a day  cefepime   IVPB 1000 milliGRAM(s) IV Intermittent every 8 hours  cefepime   IVPB      chlorhexidine 4% Liquid 1 Application(s) Topical <User Schedule>  dextrose 40% Gel 15 Gram(s) Oral once  dextrose 5%. 1000 milliLiter(s) (50 mL/Hr) IV Continuous <Continuous>  dextrose 5%. 1000 milliLiter(s) (100 mL/Hr) IV Continuous <Continuous>  dextrose 5%. 1000 milliLiter(s) (100 mL/Hr) IV Continuous <Continuous>  dextrose 50% Injectable 25 Gram(s) IV Push once  dextrose 50% Injectable 12.5 Gram(s) IV Push once  dextrose 50% Injectable 25 Gram(s) IV Push once  diltiazem    Tablet 60 milliGRAM(s) Oral every 12 hours  ferrous    sulfate 325 milliGRAM(s) Oral <User Schedule>  ferrous    sulfate 325 milliGRAM(s) Oral once  glucagon  Injectable 1 milliGRAM(s) IntraMuscular once  glucagon  Injectable 1 milliGRAM(s) IntraMuscular once  guaiFENesin ER 1200 milliGRAM(s) Oral every 12 hours  insulin glargine Injectable (LANTUS) 12 Unit(s) SubCutaneous at bedtime  insulin lispro (ADMELOG) corrective regimen sliding scale   SubCutaneous three times a day before meals  insulin lispro (ADMELOG) corrective regimen sliding scale   SubCutaneous at bedtime  insulin lispro Injectable (ADMELOG) 2 Unit(s) SubCutaneous three times a day before meals  methylPREDNISolone 4 milliGRAM(s) Oral daily  mexiletine 200 milliGRAM(s) Oral three times a day  montelukast 10 milliGRAM(s) Oral daily  mupirocin 2% Nasal 1 Application(s) Nasal two times a day  pantoprazole    Tablet 40 milliGRAM(s) Oral before breakfast  petrolatum white Ointment 1 Application(s) Topical three times a day  polyethylene glycol 3350 17 Gram(s) Oral daily  senna 2 Tablet(s) Oral at bedtime  sodium chloride 0.65% Nasal 1 Spray(s) Both Nostrils two times a day  tiotropium 18 MICROgram(s) Capsule 1 Capsule(s) Inhalation daily    MEDICATIONS  (PRN):  acetaminophen     Tablet .. 650 milliGRAM(s) Oral every 6 hours PRN Temp greater or equal to 38C (100.4F), Mild Pain (1 - 3)  aluminum hydroxide/magnesium hydroxide/simethicone Suspension 30 milliLiter(s) Oral every 4 hours PRN Dyspepsia  benzonatate 100 milliGRAM(s) Oral three times a day PRN Cough  melatonin 3 milliGRAM(s) Oral at bedtime PRN Insomnia  metoclopramide Injectable 10 milliGRAM(s) IV Push every 8 hours PRN nausea    Allergies    ampicillin (Short breath)  aspirin (Short breath)  Avelox (Short breath; Pruritus)  codeine (Short breath)  Dilaudid (Short breath)  iodine (Short breath; Swelling)  penicillin (Short breath)  shellfish (Anaphylaxis)  tetanus toxoid (Short breath)  Valium (Short breath)    Intolerances      FAMILY HISTORY:  Family history of asthma    Family history of breast cancer (Sibling)    Family history of diabetes mellitus type II      Social History:  Lives with her sister  has adult daughter in the area  former     never smoker  denies etoh or illicit drug use  uses walker at baseline, but very active doing gardening (29 Nov 2021 00:19)    P/E:  Adult Advanced Hemodynamics Last 24 Hrs  CVP(mm Hg): --  CVP(cm H2O): --  CO: --  CI: --  PA: --  PA(mean): --  PCWP: --  SVR: --  SVRI: --  PVR: --  PVRI: --  Vital Signs Last 24 Hrs  T(C): 36.9 (02 Dec 2021 04:49), Max: 36.9 (02 Dec 2021 04:49)  T(F): 98.4 (02 Dec 2021 04:49), Max: 98.4 (02 Dec 2021 04:49)  HR: 65 (02 Dec 2021 06:54) (65 - 84)  BP: 134/68 (02 Dec 2021 04:49) (134/68 - 168/67)  BP(mean): --  RR: 18 (02 Dec 2021 04:49) (17 - 18)  SpO2: 96% (02 Dec 2021 06:54) (94% - 98%)  Daily     Daily   I&O's Detail    01 Dec 2021 07:01  -  02 Dec 2021 07:00  --------------------------------------------------------  IN:    IV PiggyBack: 100 mL    Oral Fluid: 480 mL  Total IN: 580 mL    OUT:    Voided (mL): 800 mL  Total OUT: 800 mL    Total NET: -220 mL      02 Dec 2021 07:01  -  02 Dec 2021 12:05  --------------------------------------------------------  IN:  Total IN: 0 mL    OUT:    Voided (mL): 200 mL  Total OUT: 200 mL    Total NET: -200 mL        I&O's Summary    01 Dec 2021 07:01  -  02 Dec 2021 07:00  --------------------------------------------------------  IN: 580 mL / OUT: 800 mL / NET: -220 mL    02 Dec 2021 07:01  -  02 Dec 2021 12:05  --------------------------------------------------------  IN: 0 mL / OUT: 200 mL / NET: -200 mL      - gen: well appearing elderly female, sitting up in hospital bed, NAD  - HEENT: MMM, NCAT  - heart: RRR, nml S1/S2, no RG, systolic murmur   - lungs: CTABL, nml WOB, very mild wheezes. No crackles.   - abd: soft, NTND, NABS  - ext: WWP, PPP, no MARY. B/l clubbing in fingers   - back: kyphotic     RELEVANT RECENT LABS/IMAGING/STUDIES:                        10.7   9.30  )-----------( 239      ( 02 Dec 2021 07:31 )             34.6     12-02    141  |  102  |  12  ----------------------------<  136<H>  3.8   |  27  |  0.58    Ca    8.7      02 Dec 2021 07:31  Phos  2.7     12-02  Mg     2.2     12-02    TPro  6.2  /  Alb  3.2<L>  /  TBili  0.2  /  DBili  x   /  AST  17  /  ALT  17  /  AlkPhos  93  12-02    LIVER FUNCTIONS - ( 02 Dec 2021 07:31 )  Alb: 3.2 g/dL / Pro: 6.2 g/dL / ALK PHOS: 93 U/L / ALT: 17 U/L / AST: 17 U/L / GGT: x             --------------------------------------    CARDIAC MARKERS ( 02 Dec 2021 07:31 )  x     / x     / 125 U/L / x     / 1.4 ng/mL  CARDIAC MARKERS ( 01 Dec 2021 07:15 )  x     / x     / 151 U/L / x     / 1.3 ng/mL      --------------------------------------    Culture - Sputum (collected 29 Nov 2021 22:10)  Source: .Sputum Sputum  Gram Stain (30 Nov 2021 06:38):    Moderate polymorphonuclear leukocytes per low power field    No Squamous epithelial cells per low power field    Rare Gram positive cocci in pairs per oil power field  Final Report (02 Dec 2021 11:26):    Normal Respiratory Jessica present      RELEVANT REMOTE DATA:  November 2021 TTE  CONCLUSIONS:  1. Mitral annular calcification, otherwise normal mitral  valve. Minimal mitral regurgitation.  2. Calcified trileaflet aortic valve with normal opening.  Moderate-severe aortic regurgitation.  3. Mild left atrial enlargement.  4. Normal left ventricular internal dimensions and wall  thicknesses.  5. Endocardium not well visualized; grossly normal left  ventricular systolic function.  6. Mild diastolic dysfunction (Stage I).  7. The right ventricle is not well visualized; grossly  normal right ventricular systolic function.  8. Normal tricuspid valve. Minimal tricuspid regurgitation.  9. Estimated pulmonary artery systolic pressure equals 26  mm Hg, assuming right atrial pressure equals 10  mm Hg,  consistent with normal pulmonary pressures.    Chest CT November 2021   FINDINGS:    LUNGS AND AIRWAYS: Patent central airways. Consolidation in the lingula, compatible with pneumonia. Medial left lower lobe opacity which may represent atelectasis versus pneumonia. Scattered tree-in-bud opacities involving the right middle, right lower, and left upper lobes, consistent withimpacted distal bronchioles. New 9 mm right lower lobe subpleural nodule (2:88).  PLEURA: No pleural effusion.  MEDIASTINUM AND MARANDA: No lymphadenopathy.  VESSELS: Aortic calcifications.  HEART: Heart size is normal. Trace pericardial effusion. Aortic valve and coronary artery calcifications.  CHEST WALL AND LOWER NECK: Right chest wall infusion port with tip in the SVC.  VISUALIZED UPPER ABDOMEN: Calcified granuloma in the liver. Nonobstructing left renal calculus. Scattered pancreatic cysts, similar to prior.  BONES: Unchanged appearance of the osseous structures. Degenerative changes.    IMPRESSION:  Consolidation in the lingula, compatible with pneumonia.  Left lower lung opacity which may represent atelectasis versus pneumonia.  Scattered tree-in-bud opacities consistent with impacted distal bronchioles.  New 9 mm right lower lobe subpleural nodule, possibly an intrapulmonary lymph node. 3 month follow-up is recommended.      January 2021   Stress ECHO    Exercise Hemodynamic Findings:  The patient exercised for 9 min 0 sec. The baseline blood pressure was 170/76 mmHg and the baseline heart rate was 88 bpm. The maximum predicted heart rate was 148bpm. The target heart rate was 126 bpm. The peak blood pressure was 209/71 mmHg and peak heart rate was 123 bpm, representing 83 % of the maximum predicted heart rate. The peak exercise workload was 66.00 Martinez    Exercise Electrocardiographic Findings:  The resting ECG revealed: sinus rhythm without significant ST/T changes. At peak exercise, there were no significant ST segment changes present.    Exercise Echocardiographic Findings:  PRE-EXERCISE:  The ejection fraction was 70-75%. There was moderate aortic regurgitation.    POST-EXERCISE:  Overall left ventricular contractility increased with exercise. Segmental wall motion abnormalities were not seen. With stress, normal augmentation of all left ventricular wall segments noted, end systolic volume decreased and the ejection fraction increased. With stress, the left ventricular ejection fraction was >75%. The echocardiogram is non-diagnostic due to inadequate heart rate-systolic blood pressure product achieved however, no ischemic changes seen at 83 % of maximum heart rate achieved.    Pulmonary Hypertension:  Pre-exercise:  There was inadequate tricuspid regurgitation to estimate pulmonary artery systolic pressure pre-exercise.    Post-exercise:  The peak artery systolic pressurepost-exercise was 41 mmHg.    Pericardium:  No pericardial effusion is seen.      Cardiac MRI   INTERPRETATION:  1.  The left ventricle (LV) is normal in size. There is no evidence of LV hypertrophy. Left ventricular global systolic function is reduced The LV ejection fraction is  72 %.  2.  There is Moderate to Severe aortic insufficiency  3.  The right ventricle (RV) is normal in size. RV global systolic function is Normal  4.  No significant abnormalities of the visualized portions of the great vessels.   CARDIOLOGY CONSULT INITIAL EVALUATION  RAYRAY RODRIGUEZ  MRN-[id number]    CONSULTING SERVICE: Primary team   CONSULT QUESTION: Chest tightness     HPI:  71F with history of Tracheobronchomalasia s/p Tracheobronchoplasty, Bronchiectasis, Severe Allergic Asthma, Adrenal Insuffiencey, Port, DM2, HTN, Colorectal cancer s/p total colectomy now with colostomy, PAF on Eliquis p/w 1 day of chest discomfort associated with SOB. States yesterday evening she noticed gradual onset chest discomfort. She had to reposition herself to help with the sx. She called her MD who told her to go to the hospital. She reports the chest discomfort is 9/10, intermittent, no palliating or exacerbating factors, "tightness" in sensation, and associated with SOB and nausea. States had chronic cough,  has been making sputum. Denies any actual chest pain, abd pain, vomiting, changes in colostomy output. Denies any recent illness, rhinorrhea, HA, LE edema. Felt warm, but temp at home was 97. States has had chest discomfort in the past and at that time ended up being diagnosed with an infection (per chart review, hx of pseudomonas on bronchial cx in Feb 2020, sensitive to zosyn).    Pt with refractory chest tightness despite abx tx for pna and cardiology consulted for further cardiac risk stratification.     In the ED: T 99.3, HR 80, -170s, satting mid 90s on 2L nc. Given zofran, diltiazem 30 x1, eliquis 5mg x1, tylenol 650 x1, and reglan 10, and suraj 1g.   CXR w (29 Nov 2021 00:19)    ROS:  Negative, except as above.    PAST MEDICAL & SURGICAL HISTORY:  Atrial fibrillation  paroxysmal, on eliquis    Diabetes  Type 2    COPD (chronic obstructive pulmonary disease)    Adrenal insufficiency  Medrol daily for over 50 years    Aortic insufficiency  moderate AR on echo 5/3/2018    Pelvic fracture    Asthma    Tracheobronchomalacia  diagnosed 2015, s/p bronchial thermoplasty 2016 (Dr Zapien); recent bronchoscopy 6/5/2018 revealed no evidence of tracheobronchomalacia in trachea or bronchial tubes    Colorectal cancer  4/2018- last treatment , chemo and radiation    Rectal bleeding    Seizure  x 1 1/7/18    DVT (deep venous thrombosis)  15-20 years ago, took coumadin    TIA (transient ischemic attack)  multiple, last 5 years ago - presents as right-sided weakness    History of partial hysterectomy  30 years ago - fibroids    H/O total knee replacement, bilateral  5 years ago    History of sinus surgery  multiple sinus surgeries    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    H/O pelvic surgery  5 years ago - s/p fracture    History of tracheomalacia  2015 - attempted tracheal stenting ([facility])- course complicated by obstruction, respiratory failure, multiple CPR attempts -  stent discontinued; 10/20/2016 Tracheobronchoplasty (Prolene Mesh) performed at [facility] by Dr Zapien    S/P bronchoscopy  6/5/2018 - [place] (Dr Zapien) no evidence of tracheobronchomalacia in trachea or bronchial tubes    Rectal bleeding  exam under anesthesia ([facility]) 2/2018      Prescriptions:  mexiletine 200 mg oral capsule: 1 cap(s) orally 3 times a day (29 Nov 2021 01:00)  Protonix 40 mg oral delayed release tablet: 1 tab(s) orally once a day - 1/2 hour before breakfast (29 Nov 2021 01:00)    Home Medications:  apixaban 5 mg oral tablet: 1 tab(s) orally every 12 hours (29 Nov 2021 01:00)  Breo Ellipta 200 mcg-25 mcg/inh inhalation powder: 1 puff(s) inhaled once a day (29 Nov 2021 01:00)  Crestor 5 mg oral tablet: 1 tab(s) orally once a day (at bedtime) (29 Nov 2021 01:00)  dilTIAZem 30 mg oral tablet: 2 tab(s) orally every 12 hours (29 Nov 2021 01:00)  HumaLOG 100 units/mL subcutaneous solution: Sliding Scale (29 Nov 2021 01:00)  ipratropium-albuterol 0.5 mg-2.5 mg/3 mLinhalation solution: 3 milliliter(s) inhaled every 6 hours (29 Nov 2021 01:00)  Lantus 100 units/mL subcutaneous solution: 15 unit(s) subcutaneous once (at bedtime) (29 Nov 2021 01:00)  methylPREDNISolone 4 mg oral tablet: 1 tab(s) orally once a day (29 Nov 2021 01:00)  MiraLax oral powder for reconstitution: 17 gram(s) orally once a day (29 Nov 2021 01:00)  Senna 8.6 mg oral tablet: 1 tab(s) orally once a day (at bedtime) (29 Nov 2021 01:00)  Singulair 10 mg oral tablet: 1 tab(s) orally once a day (29 Nov 2021 01:00)  Spiriva 18 mcg inhalation capsule: 1 cap(s) inhaled once a day (29 Nov 2021 01:00)  Victoza 18 mg/3 mL subcutaneous solution: 1.8 milligram(s) subcutaneous once a day (29 Nov 2021 01:00)  Zofran 8 mg oral tablet: 1 tab(s) orally 3 times a day, As Needed (29 Nov 2021 01:00)    MEDICATIONS  (STANDING):  acetylcysteine 20%  Inhalation 4 milliLiter(s) Inhalation three times a day  ALBUTerol    90 MICROgram(s) HFA Inhaler 2 Puff(s) Inhalation every 6 hours  apixaban 5 milliGRAM(s) Oral every 12 hours  budesonide 160 MICROgram(s)/formoterol 4.5 MICROgram(s) Inhaler 2 Puff(s) Inhalation two times a day  cefepime   IVPB 1000 milliGRAM(s) IV Intermittent every 8 hours  cefepime   IVPB      chlorhexidine 4% Liquid 1 Application(s) Topical <User Schedule>  dextrose 40% Gel 15 Gram(s) Oral once  dextrose 5%. 1000 milliLiter(s) (50 mL/Hr) IV Continuous <Continuous>  dextrose 5%. 1000 milliLiter(s) (100 mL/Hr) IV Continuous <Continuous>  dextrose 5%. 1000 milliLiter(s) (100 mL/Hr) IV Continuous <Continuous>  dextrose 50% Injectable 25 Gram(s) IV Push once  dextrose 50% Injectable 12.5 Gram(s) IV Push once  dextrose 50% Injectable 25 Gram(s) IV Push once  diltiazem    Tablet 60 milliGRAM(s) Oral every 12 hours  ferrous    sulfate 325 milliGRAM(s) Oral <User Schedule>  ferrous    sulfate 325 milliGRAM(s) Oral once  glucagon  Injectable 1 milliGRAM(s) IntraMuscular once  glucagon  Injectable 1 milliGRAM(s) IntraMuscular once  guaiFENesin ER 1200 milliGRAM(s) Oral every 12 hours  insulin glargine Injectable (LANTUS) 12 Unit(s) SubCutaneous at bedtime  insulin lispro (ADMELOG) corrective regimen sliding scale   SubCutaneous three times a day before meals  insulin lispro (ADMELOG) corrective regimen sliding scale   SubCutaneous at bedtime  insulin lispro Injectable (ADMELOG) 2 Unit(s) SubCutaneous three times a day before meals  methylPREDNISolone 4 milliGRAM(s) Oral daily  mexiletine 200 milliGRAM(s) Oral three times a day  montelukast 10 milliGRAM(s) Oral daily  mupirocin 2% Nasal 1 Application(s) Nasal two times a day  pantoprazole    Tablet 40 milliGRAM(s) Oral before breakfast  petrolatum white Ointment 1 Application(s) Topical three times a day  polyethylene glycol 3350 17 Gram(s) Oral daily  senna 2 Tablet(s) Oral at bedtime  sodium chloride 0.65% Nasal 1 Spray(s) Both Nostrils two times a day  tiotropium 18 MICROgram(s) Capsule 1 Capsule(s) Inhalation daily    MEDICATIONS  (PRN):  acetaminophen     Tablet .. 650 milliGRAM(s) Oral every 6 hours PRN Temp greater or equal to 38C (100.4F), Mild Pain (1 - 3)  aluminum hydroxide/magnesium hydroxide/simethicone Suspension 30 milliLiter(s) Oral every 4 hours PRN Dyspepsia  benzonatate 100 milliGRAM(s) Oral three times a day PRN Cough  melatonin 3 milliGRAM(s) Oral at bedtime PRN Insomnia  metoclopramide Injectable 10 milliGRAM(s) IV Push every 8 hours PRN nausea    Allergies    ampicillin (Short breath)  aspirin (Short breath)  Avelox (Short breath; Pruritus)  codeine (Short breath)  Dilaudid (Short breath)  iodine (Short breath; Swelling)  penicillin (Short breath)  shellfish (Anaphylaxis)  tetanus toxoid (Short breath)  Valium (Short breath)    Intolerances      FAMILY HISTORY:  Family history of asthma    Family history of breast cancer (Sibling)    Family history of diabetes mellitus type II      Social History:  Lives with her sister  has adult daughter in the area  former     never smoker  denies etoh or illicit drug use  uses walker at baseline, but very active doing gardening (29 Nov 2021 00:19)    P/E:  Adult Advanced Hemodynamics Last 24 Hrs  CVP(mm Hg): --  CVP(cm H2O): --  CO: --  CI: --  PA: --  PA(mean): --  PCWP: --  SVR: --  SVRI: --  PVR: --  PVRI: --  Vital Signs Last 24 Hrs  T(C): 36.9 (02 Dec 2021 04:49), Max: 36.9 (02 Dec 2021 04:49)  T(F): 98.4 (02 Dec 2021 04:49), Max: 98.4 (02 Dec 2021 04:49)  HR: 65 (02 Dec 2021 06:54) (65 - 84)  BP: 134/68 (02 Dec 2021 04:49) (134/68 - 168/67)  BP(mean): --  RR: 18 (02 Dec 2021 04:49) (17 - 18)  SpO2: 96% (02 Dec 2021 06:54) (94% - 98%)  Daily     Daily   I&O's Detail    01 Dec 2021 07:01  -  02 Dec 2021 07:00  --------------------------------------------------------  IN:    IV PiggyBack: 100 mL    Oral Fluid: 480 mL  Total IN: 580 mL    OUT:    Voided (mL): 800 mL  Total OUT: 800 mL    Total NET: -220 mL      02 Dec 2021 07:01  -  02 Dec 2021 12:05  --------------------------------------------------------  IN:  Total IN: 0 mL    OUT:    Voided (mL): 200 mL  Total OUT: 200 mL    Total NET: -200 mL        I&O's Summary    01 Dec 2021 07:01  -  02 Dec 2021 07:00  --------------------------------------------------------  IN: 580 mL / OUT: 800 mL / NET: -220 mL    02 Dec 2021 07:01  -  02 Dec 2021 12:05  --------------------------------------------------------  IN: 0 mL / OUT: 200 mL / NET: -200 mL      - gen: well appearing elderly female, sitting up in hospital bed, NAD  - HEENT: MMM, NCAT  - heart: RRR, nml S1/S2, no RG, systolic murmur   - lungs: CTABL, nml WOB, very mild wheezes. No crackles.   - abd: soft, NTND, NABS  - ext: WWP, PPP, no MARY. B/l clubbing in fingers   - back: kyphotic     RELEVANT RECENT LABS/IMAGING/STUDIES:                        10.7   9.30  )-----------( 239      ( 02 Dec 2021 07:31 )             34.6     12-02    141  |  102  |  12  ----------------------------<  136<H>  3.8   |  27  |  0.58    Ca    8.7      02 Dec 2021 07:31  Phos  2.7     12-02  Mg     2.2     12-02    TPro  6.2  /  Alb  3.2<L>  /  TBili  0.2  /  DBili  x   /  AST  17  /  ALT  17  /  AlkPhos  93  12-02    LIVER FUNCTIONS - ( 02 Dec 2021 07:31 )  Alb: 3.2 g/dL / Pro: 6.2 g/dL / ALK PHOS: 93 U/L / ALT: 17 U/L / AST: 17 U/L / GGT: x             --------------------------------------    CARDIAC MARKERS ( 02 Dec 2021 07:31 )  x     / x     / 125 U/L / x     / 1.4 ng/mL  CARDIAC MARKERS ( 01 Dec 2021 07:15 )  x     / x     / 151 U/L / x     / 1.3 ng/mL      --------------------------------------    Culture - Sputum (collected 29 Nov 2021 22:10)  Source: .Sputum Sputum  Gram Stain (30 Nov 2021 06:38):    Moderate polymorphonuclear leukocytes per low power field    No Squamous epithelial cells per low power field    Rare Gram positive cocci in pairs per oil power field  Final Report (02 Dec 2021 11:26):    Normal Respiratory Jessica present      RELEVANT REMOTE DATA:  November 2021 TTE  CONCLUSIONS:  1. Mitral annular calcification, otherwise normal mitral valve. Minimal mitral regurgitation.  2. Calcified trileaflet aortic valve with normal opening. Moderate-severe aortic regurgitation.  3. Mild left atrial enlargement.  4. Normal left ventricular internal dimensions and wall thicknesses.  5. Endocardium not well visualized; grossly normal left ventricular systolic function.  6. Mild diastolic dysfunction (Stage I).  7. The right ventricle is not well visualized; grossly normal right ventricular systolic function.  8. Normal tricuspid valve. Minimal tricuspid regurgitation.  9. Estimated pulmonary artery systolic pressure equals 26 mm Hg, assuming right atrial pressure equals 10  mm Hg, consistent with normal pulmonary pressures.    Chest CT November 2021   FINDINGS:    LUNGS AND AIRWAYS: Patent central airways. Consolidation in the lingula, compatible with pneumonia. Medial left lower lobe opacity which may represent atelectasis versus pneumonia. Scattered tree-in-bud opacities involving the right middle, right lower, and left upper lobes, consistent with impacted distal bronchioles. New 9 mm right lower lobe subpleural nodule (2:88).  PLEURA: No pleural effusion.  MEDIASTINUM AND MARANDA: No lymphadenopathy.  VESSELS: Aortic calcifications.  HEART: Heart size is normal. Trace pericardial effusion. Aortic valve and coronary artery calcifications.  CHEST WALL AND LOWER NECK: Right chest wall infusion port with tip in the SVC.  VISUALIZED UPPER ABDOMEN: Calcified granuloma in the liver. Non-obstructing left renal calculus. Scattered pancreatic cysts, similar to prior.  BONES: Unchanged appearance of the osseous structures. Degenerative changes.    IMPRESSION:  Consolidation in the lingula, compatible with pneumonia.  Left lower lung opacity which may represent atelectasis versus pneumonia.  Scattered tree-in-bud opacities consistent with impacted distal bronchioles.  New 9 mm right lower lobe subpleural nodule, possibly an intrapulmonary lymph node. 3 month follow-up is recommended.    January 2021   Stress ECHO    Exercise Hemodynamic Findings:  The patient exercised for 9 min 0 sec. The baseline blood pressure was 170/76 mmHg and the baseline heart rate was 88 bpm. The maximum predicted heart rate was 148bpm. The target heart rate was 126 bpm. The peak blood pressure was 209/71 mmHg and peak heart rate was 123 bpm, representing 83 % of the maximum predicted heart rate. The peak exercise workload was 66.00 Martinez    Exercise Electrocardiographic Findings:  The resting ECG revealed: sinus rhythm without significant ST/T changes. At peak exercise, there were no significant ST segment changes present.    Exercise Echocardiographic Findings:  PRE-EXERCISE:  The ejection fraction was 70-75%. There was moderate aortic regurgitation.    POST-EXERCISE:  Overall left ventricular contractility increased with exercise. Segmental wall motion abnormalities were not seen. With stress, normal augmentation of all left ventricular wall segments noted, end systolic volume decreased and the ejection fraction increased. With stress, the left ventricular ejection fraction was >75%. The echocardiogram is non-diagnostic due to inadequate heart rate-systolic blood pressure product achieved however, no ischemic changes seen at 83 % of maximum heart rate achieved.    Pulmonary Hypertension:  Pre-exercise:  There was inadequate tricuspid regurgitation to estimate pulmonary artery systolic pressure pre-exercise.    Post-exercise:  The peak artery systolic pressure post-exercise was 41 mmHg.    Pericardium:  No pericardial effusion is seen.    Cardiac MRI   INTERPRETATION:  1.  The left ventricle (LV) is normal in size. There is no evidence of LV hypertrophy. Left ventricular global systolic function is reduced The LV ejection fraction is  72 %.  2.  There is Moderate to Severe aortic insufficiency  3.  The right ventricle (RV) is normal in size. RV global systolic function is Normal  4.  No significant abnormalities of the visualized portions of the great vessels.   CARDIOLOGY CONSULT INITIAL EVALUATION  RAYRAY RODRIGUEZ  MRN-[id number]    CONSULTING SERVICE: Primary team   CONSULT QUESTION: Chest tightness     HPI:  71F with history of tracheobronchomalasia s/p tracheobronchoplasty as well as hx. of bronchiectasis and severe allergic asthma.  Also, cardiac hx. includes HTN, PAF on Eliquis and has hx. of DM.  P/W 1 day of chest discomfort associated with SOB.  She recounts gradual onset chest discomfort, noticed with inspiration; no exertional.  When most severe, chest discomfort is 9/10, brief, not associated with palps or dyspnea.    She reports chronic cough, has been making sputum. Denies abd pain, vomiting.  Patient has had chest discomfort in the past and at that time was diagnosed with an infection (per chart review, hx of pseudomonas on bronchial cx in Feb 2020, sensitive to zosyn).  Ex-echo in Jan. of this year showed no ischemia.       PAST MEDICAL & SURGICAL HISTORY:  Atrial fibrillation, paroxysmal  VPCs, evaluated at [facility] in past; treated with Mexitil  Diabetes, Type 2  COPD (chronic obstructive pulmonary disease)  Adrenal insufficiency  Medrol daily for over 50 years  Aortic insufficiency, moderate to severe echo since 2019  Pelvic fracture  Asthma  Tracheobronchomalacia, diagnosed 2015, s/p bronchial thermoplasty 2016 (Dr Zapien); recent bronchoscopy 6/5/2018 revealed no evidence of tracheobronchomalacia in trachea or bronchial tubes  Colorectal cancer, s/p resection, chemo and radiation  Seizure x 1 1/7/18  DVT (deep venous thrombosis), 15-20 years ago, took Coumadin  TIA (transient ischemic attack), multiple, last 5 years ago - presents as right-sided weakness  History of partial hysterectomy, 30 years ago - fibroids  H/O total knee replacement, bilateral,  5 years ago  History of sinus surgery  Exostosis of orbit, 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 1000 milliLiter(s) (50 mL/Hr) IV Continuous <Continuous>  dextrose 5%. 1000 milliLiter(s) (100 mL/Hr) IV Continuous <Continuous>  dextrose 5%. 1000 milliLiter(s) (100 mL/Hr) IV Continuous <Continuous>  dextrose 50% Injectable 25 Gram(s) IV Push once  dextrose 50% Injectable 12.5 Gram(s) IV Push once  dextrose 50% Injectable 25 Gram(s) IV Push once  diltiazem    Tablet 60 milliGRAM(s) Oral every 12 hours  ferrous    sulfate 325 milliGRAM(s) Oral <User Schedule>  ferrous    sulfate 325 milliGRAM(s) Oral once  glucagon  Injectable 1 milliGRAM(s) IntraMuscular once  glucagon  Injectable 1 milliGRAM(s) IntraMuscular once  guaiFENesin ER 1200 milliGRAM(s) Oral every 12 hours  insulin glargine Injectable (LANTUS) 12 Unit(s) SubCutaneous at bedtime  insulin lispro (ADMELOG) corrective regimen sliding scale   SubCutaneous three times a day before meals  insulin lispro (ADMELOG) corrective regimen sliding scale   SubCutaneous at bedtime  insulin lispro Injectable (ADMELOG) 2 Unit(s) SubCutaneous three times a day before meals  methylPREDNISolone 4 milliGRAM(s) Oral daily  mexiletine 200 milliGRAM(s) Oral three times a day  montelukast 10 milliGRAM(s) Oral daily  mupirocin 2% Nasal 1 Application(s) Nasal two times a day  pantoprazole    Tablet 40 milliGRAM(s) Oral before breakfast  petrolatum white Ointment 1 Application(s) Topical three times a day  polyethylene glycol 3350 17 Gram(s) Oral daily  senna 2 Tablet(s) Oral at bedtime  sodium chloride 0.65% Nasal 1 Spray(s) Both Nostrils two times a day  tiotropium 18 MICROgram(s) Capsule 1 Capsule(s) Inhalation daily    MEDICATIONS  (PRN):  acetaminophen     Tablet .. 650 milliGRAM(s) Oral every 6 hours PRN Temp greater or equal to 38C (100.4F), Mild Pain (1 - 3)  aluminum hydroxide/magnesium hydroxide/simethicone Suspension 30 milliLiter(s) Oral every 4 hours PRN Dyspepsia  benzonatate 100 milliGRAM(s) Oral three times a day PRN Cough  melatonin 3 milliGRAM(s) Oral at bedtime PRN Insomnia  metoclopramide Injectable 10 milliGRAM(s) IV Push every 8 hours PRN nausea      Allergies:    ampicillin (Short breath)  aspirin (Short breath)  Avelox (Short breath; Pruritus)  codeine (Short breath)  Dilaudid (Short breath)  iodine (Short breath; Swelling)  penicillin (Short breath)  shellfish (Anaphylaxis)  tetanus toxoid (Short breath)  Valium (Short breath)      FAMILY HISTORY:  Family history of asthma  Family history of breast cancer (Sibling)  Family history of diabetes mellitus type II      Social History:  Lives with her sister  has adult daughter in the area  former   never smoker  denies etoh or illicit drug use  uses walker at baseline, but very active doing gardening (29 Nov 2021 00:19)        ROS:  General: no fatigue/malaise, weight loss/gain.  Skin: no rashes.  Eyes: no blurred vision, no loss of vision. 	  ENT: no sore throat, rhinorrhea, sinus congestion.  Gastrointestinal:  no N/V/D, no melena/hematemesis/hematochezia.  Genitourinary: no dysuria/hesitancy or hematuria.  Musculoskeletal: no myalgias or arthralgias.  Neurological: no changes in vision or hearing, no lightheadedness/dizziness, no syncope/near syncope	  Psychiatric: no unusual stress/anxiety.   Hematology/Lymphatics: no unusual bleeding, bruising and no lymphadenopathy.  All others negative except as stated above and in HPI.       Vital Signs Last 24 Hrs  T(C): 36.9 (02 Dec 2021 04:49), Max: 36.9 (02 Dec 2021 04:49)  T(F): 98.4 (02 Dec 2021 04:49), Max: 98.4 (02 Dec 2021 04:49)  HR: 65 (02 Dec 2021 06:54) (65 - 84)  BP: 134/68 (02 Dec 2021 04:49) (134/68 - 168/67)  RR: 18 (02 Dec 2021 04:49) (17 - 18)  SpO2: 96% (02 Dec 2021 06:54) (94% - 98%)    - gen: well appearing elderly female, sitting up in hospital bed, NAD  - HEENT: MMM, NCAT  - heart: RRR, nml S1/S2, no RG, systolic murmur   - lungs: CTABL, nml WOB, very mild wheezes. No crackles.   - abd: soft, NTND, NABS  - ext: WWP, PPP, no MARY. B/l clubbing in fingers   - back: kyphotic       I&O's Summary  01 Dec 2021 07:01  -  02 Dec 2021 07:00  --------------------------------------------------------  IN: 580 mL / OUT: 800 mL / NET: -220 mL    02 Dec 2021 07:01  -  02 Dec 2021 12:05  --------------------------------------------------------  IN: 0 mL / OUT: 200 mL / NET: -200 mL      12 Lead ECG (12.01.21 @ 17:40)   NSR at 86 BPM   P-R Interval 188 ms, QRS Duration 102 ms, Q-T Interval 348 ms  Axis -46 degrees   LEFT AXIS DEVIATION, LEFT VENTRICULAR HYPERTROPHY WITH REPOLARIZATION ABNORMALITY.  SIGNIFICANT CHANGES HAVE OCCURRED  Confirmed by ROSA RENEE, ATUL MOJICA ([id number]) on 12/2/2021 3:23:48 PM      RELEVANT RECENT LABS/IMAGING/STUDIES:                      10.7   9.30  )-----------( 239      ( 02 Dec 2021 07:31 )             34.6     12-02  141  |  102  |  12  ----------------------------<  136<H>  3.8   |  27  |  0.58    Ca    8.7      02 Dec 2021 07:31  Phos  2.7     12-02  Mg     2.2     12-02    TPro  6.2  /  Alb  3.2<L>  /  TBili  0.2  /  DBili  x   /  AST  17  /  ALT  17  /  AlkPhos  93  12-02    LIVER FUNCTIONS - ( 02 Dec 2021 07:31 )  Alb: 3.2 g/dL / Pro: 6.2 g/dL / ALK PHOS: 93 U/L / ALT: 17 U/L / AST: 17 U/L / GGT: x             CARDIAC MARKERS ( 02 Dec 2021 07:31 ) x     / x     / 125 U/L / x     / 1.4 ng/mL  CARDIAC MARKERS ( 01 Dec 2021 07:15 ) x     / x     / 151 U/L / x     / 1.3 ng/mL    Troponin 18 -> 20 -> 31 -> 14 -> 17      Culture - Sputum (collected 29 Nov 2021 22:10)  Source: .Sputum Sputum  Gram Stain (30 Nov 2021 06:38):    Moderate polymorphonuclear leukocytes per low power field    No Squamous epithelial cells per low power field    Rare Gram positive cocci in pairs per oil power field  Final Report (02 Dec 2021 11:26):    Normal Respiratory Jessica present        November 2021 TTE  CONCLUSIONS:  1. Mitral annular calcification, otherwise normal mitral valve. Minimal mitral regurgitation.  2. Calcified trileaflet aortic valve with normal opening. Moderate-severe aortic regurgitation.  3. Mild left atrial enlargement.  4. Normal left ventricular internal dimensions and wall thicknesses.  5. Endocardium not well visualized; grossly normal left ventricular systolic function.  6. Mild diastolic dysfunction (Stage I).  7. The right ventricle is not well visualized; grossly normal right ventricular systolic function.  8. Normal tricuspid valve. Minimal tricuspid regurgitation.  9. Estimated pulmonary artery systolic pressure equals 26 mm Hg, assuming right atrial pressure equals 10  mm Hg, consistent with normal pulmonary pressures.      Chest CT November 2021   FINDINGS:  LUNGS AND AIRWAYS: Patent central airways. Consolidation in the lingula, compatible with pneumonia. Medial left lower lobe opacity which may represent atelectasis versus pneumonia. Scattered tree-in-bud opacities involving the right middle, right lower, and left upper lobes, consistent with impacted distal bronchioles. New 9 mm right lower lobe subpleural nodule (2:88).  PLEURA: No pleural effusion.  MEDIASTINUM AND MARANDA: No lymphadenopathy.  VESSELS: Aortic calcifications.  HEART: Heart size is normal. Trace pericardial effusion. Aortic valve and coronary artery calcifications.  CHEST WALL AND LOWER NECK: Right chest wall infusion port with tip in the SVC.  VISUALIZED UPPER ABDOMEN: Calcified granuloma in the liver. Non-obstructing left renal calculus. Scattered pancreatic cysts, similar to prior.  BONES: Unchanged appearance of the osseous structures. Degenerative changes.    IMPRESSION:  Consolidation in the lingula, compatible with pneumonia.  Left lower lung opacity which may represent atelectasis versus pneumonia.  Scattered tree-in-bud opacities consistent with impacted distal bronchioles.  New 9 mm right lower lobe subpleural nodule, possibly an intrapulmonary lymph node. 3 month follow-up is recommended.      January 2021 - Stress ECHO  Exercise Hemodynamic Findings:  The patient exercised for 9 min 0 sec. The baseline blood pressure was 170/76 mmHg and the baseline heart rate was 88 bpm. The maximum predicted heart rate was 148bpm. The target heart rate was 126 bpm. The peak blood pressure was 209/71 mmHg and peak heart rate was 123 bpm, representing 83 % of the maximum predicted heart rate. The peak exercise workload was 66.00 Martinez    Exercise Electrocardiographic Findings:  The resting ECG revealed: sinus rhythm without significant ST/T changes. At peak exercise, there were no significant ST segment changes present.    Exercise Echocardiographic Findings:  PRE-EXERCISE:  The ejection fraction was 70-75%. There was moderate aortic regurgitation.    POST-EXERCISE:  Overall left ventricular contractility increased with exercise. Segmental wall motion abnormalities were not seen. With stress, normal augmentation of all left ventricular wall segments noted, end systolic volume decreased and the ejection fraction increased. With stress, the left ventricular ejection fraction was >75%. The echocardiogram is non-diagnostic due to inadequate heart rate-systolic blood pressure product achieved however, no ischemic changes seen at 83 % of maximum heart rate achieved.    Pulmonary Hypertension:  Pre-exercise:  There was inadequate tricuspid regurgitation to estimate pulmonary artery systolic pressure pre-exercise.    Post-exercise:  The peak artery systolic pressure post-exercise was 41 mmHg.    Pericardium:  No pericardial effusion is seen.    Cardiac MRI   INTERPRETATION:  1.  The left ventricle (LV) is normal in size. There is no evidence of LV hypertrophy. Left ventricular global systolic function is reduced The LV ejection fraction is  72 %.  2.  There is Moderate to Severe aortic insufficiency  3.  The right ventricle (RV) is normal in size. RV global systolic function is Normal  4.  No significant abnormalities of the visualized portions of the great vessels.

## 2021-12-02 NOTE — CONSULT NOTE ADULT - ASSESSMENT
71 y.o. F with tracheobronchomalacia s/p tracheobronchoplasty, severe persistent asthma, bronchiectasis, and colon cancer s/p colectomy presented for  71 y.o. F with tracheobronchomalacia s/p tracheobronchoplasty, severe persistent asthma, bronchiectasis, palpitations thought to be 2/2 PVCs and bigeminy and colon cancer s/p colectomy presented for SOB, admitted for bronchiectasis exacerbation 2/2 pna. Chest tightness despite pna tx, with unremarkable cardiac enzymes and ECG, cardiology consulted for further cardiac work up and risk stratification.     **** NOTE INCOMPLETE ****    # Chest tightness   - DDx underlying bronchiectasis vs pna vs palpitations induced vs GI vs ..... ???   - Cardiac enzymes wnl   - ECG without acute ST changes   - Stress echo without signs of ischemic disease Jan 2021  - Cardiac MRI Jan 2021 with combined diastolic and LV systolic dysfunction (LV EF 72%) and mild-mod AR  - TTE Nov 2021 with mod-severe AR, mild LA enlargement, LV RV systolic function wnl, mild grade 1 diastolic dysfunction, and normal pulmonary pressures 26 mmHg and RA pressure 10mmHg   - Broncho regimen per pulm   - Rec nuclear stress test   - A1C 7.8 Nov 2021  - LDL 76 in Jan 2021, repeat Lipid panel     # Paroxysmal a.fib   - Cardizem 60mg BID   - Eliquis 5mg BID   - Rate controlled     # Palpitations  - On Mexiletine 200mg TID as per EP (seen in previous hospital stay)     71 y.o. F with tracheobronchomalacia s/p tracheobronchoplasty, severe persistent asthma, bronchiectasis, palpitations thought to be 2/2 PVCs and bigeminy and colon cancer s/p colectomy presented for SOB, admitted for bronchiectasis exacerbation 2/2 pna. Chest tightness despite pna tx, with unremarkable cardiac enzymes and ECG, cardiology consulted for further cardiac work up and risk stratification.     **** NOTE INCOMPLETE ****    # Chest tightness   - DDx underlying bronchiectasis vs pna vs palpitations induced vs GI vs ..... ???   - Cardiac enzymes wnl   - ECG without acute ST changes   - Stress echo without signs of ischemic disease Jan 2021  - Cardiac MRI (outpatient) Jan 2021 with combined diastolic and LV systolic dysfunction (LV EF 72%) and mild-mod AR  - TTE Nov 2021 with mod-severe AR, mild LA enlargement, LV RV systolic function wnl, mild grade 1 diastolic dysfunction, and normal pulmonary pressures 26 mmHg and RA pressure 10mmHg   - Broncho regimen per pulm   - Rec nuclear stress test   - A1C 7.8 Nov 2021  - LDL 76 in Jan 2021, repeat Lipid panel     # Paroxysmal a.fib   - Cardizem 60mg BID   - Eliquis 5mg BID   - Rate controlled   - TSH wnl     # Palpitations  - On Mexiletine 200mg TID as per EP (seen in previous hospital stay)  - TSH wnl    71 y.o. F with tracheobronchomalacia s/p tracheobronchoplasty, severe persistent asthma, bronchiectasis, palpitations thought to be 2/2 PVCs and bigeminy and colon cancer s/p colectomy presented for SOB, admitted for bronchiectasis exacerbation 2/2 pna. Chest tightness despite pna tx, with unremarkable cardiac enzymes and ECG, cardiology consulted for further cardiac work up and risk stratification.     **** NOTE INCOMPLETE ****    # Chest tightness   - DDx underlying bronchiectasis vs pna vs restrictive pulmonary process in s/o kyphosis, vs palpitations induced vs anxiety??   - Currently without chest pain, chest tightness, SOB. Reports dyspnea on exertion. Denies orthopnea/PND/ palpitations   - Risk factors for CAD: T2DM and HTN. A1C 7.8 Nov 2021; LDL 76 in Jan 2021  - Cardiac enzymes wnl   - ECG without acute ST changes   - Stress echo without signs of ischemic disease Jan 2021  - Cardiac MRI (outpatient) Jan 2021 with combined diastolic and LV systolic dysfunction (LV EF 72%) and mild-mod AR  - TTE Nov 2021 with mod-severe AR, mild LA enlargement, LV RV systolic function wnl, mild grade 1 diastolic dysfunction, and normal pulmonary pressures 26 mmHg and RA pressure 10mmHg   - Broncho regimen per pulm   - Rec nuclear stress test     # Paroxysmal a.fib   - Cardizem 60mg BID   - Eliquis 5mg BID   - Rate controlled   - TSH wnl     # Palpitations  - On Mexiletine 200mg TID as per EP (seen in previous hospital stay). Pt subjectively reports breathing and ability to take in deep breaths much improved since starting medication  - TSH wnl   - Need tele??    71 y.o. F with tracheobronchomalacia s/p tracheobronchoplasty, severe persistent asthma, bronchiectasis, palpitations thought to be 2/2 PVCs and bigeminy and colon cancer s/p colectomy presented for SOB, admitted for bronchiectasis exacerbation 2/2 pna. Chest tightness despite pna tx, with unremarkable cardiac enzymes and ECG, cardiology consulted for further cardiac work up and risk stratification.     **** NOTE INCOMPLETE ****    # Chest tightness   - DDx underlying bronchiectasis vs pna vs restrictive pulmonary process in s/o kyphosis, vs palpitations induced vs anxiety??   - Currently without chest pain, chest tightness, SOB. Reports dyspnea on exertion. Denies orthopnea/PND/ palpitations   - Risk factors for CAD: T2DM and HTN. A1C 7.8 Nov 2021; LDL 76 in Jan 2021  - Cardiac enzymes wnl   - ECG without acute ST changes   - Stress echo without signs of ischemic disease Jan 2021  - Cardiac MRI (outpatient) Jan 2021 with combined diastolic and LV systolic dysfunction (LV EF 72%) and mild-mod AR  - TTE Nov 2021 with mod-severe AR, mild LA enlargement, LV RV systolic function wnl, mild grade 1 diastolic dysfunction, and normal pulmonary pressures 26 mmHg and RA pressure 10mmHg   - Respiratory tx regimen per pulm   - Rec:  -     # Paroxysmal a.fib   - Cardizem 60mg BID   - Eliquis 5mg BID   - Rate controlled   - TSH wnl     # Palpitations  - On Mexiletine 200mg TID as per EP (seen in previous hospital stay). Pt subjectively reports breathing and ability to take in deep breaths much improved since starting medication  - TSH wnl   - Need tele??    71 y.o. F with tracheobronchomalacia s/p tracheobronchoplasty, severe persistent asthma, bronchiectasis, palpitations thought to be 2/2 PVCs and bigeminy and colon cancer s/p colectomy presented for SOB, admitted for bronchiectasis exacerbation 2/2 pna. Chest tightness despite pna tx, with unremarkable cardiac enzymes and ECG, cardiology consulted for further cardiac work up and risk stratification.     # Atypical Chest tightness   - DDx underlying bronchiectasis vs pna vs restrictive pulmonary process in s/o kyphosis, vs palpitations induced vs anxiety??   - Currently without chest pain, KRAMER, orthopnea/PND/ palpitations. States chest tightness worsens with deep breaths   - Risk factors for CAD: T2DM and HTN. A1C 7.8 Nov 2021; LDL 76 in Jan 2021  - Cardiac enzymes wnl   - ECG without acute ST changes   - Stress echo without signs of ischemic disease Jan 2021  - Cardiac MRI (outpatient) Jan 2021 with combined diastolic and LV systolic dysfunction (LV EF 72%) and mild-mod AR  - TTE Nov 2021 with mod-severe AR, mild LA enlargement, LV RV systolic function wnl, mild grade 1 diastolic dysfunction, and normal pulmonary pressures 26 mmHg and RA pressure 10mmHg   - Respiratory tx regimen per pulm     - Rec:   - conservative management at this time given atypical symptoms that are very unlikely related to cardiac issues. Spoke with pt's outpatient cardiologist Dr. Rico Glover, who also agrees that pt has had long-standing atypical chest tightness and discomfort. With recent normal stress echo, he would also favor conservative management at this time.     # Paroxysmal a.fib   - C/w Cardizem 60mg BID & Eliquis 5mg BID   - Rate controlled at this time  - TSH wnl     # Palpitations  - On Mexiletine 200mg TID as per EP (seen in previous hospital stay). Pt subjectively reports breathing and ability to take in deep breaths much improved since starting medication  - TSH wnl   - No need for telemetry    Care discussed with fellow, attending, primary team, and Dr. Rico Glover.     Nadine Dunlap MD  PGY-2  Cardiology resident    71 y.o. F with tracheobronchomalacia s/p tracheobronchoplasty, severe persistent asthma, bronchiectasis, palpitations thought to be 2/2 PVCs and bigeminy and colon cancer s/p colectomy presented for SOB, admitted for bronchiectasis exacerbation 2/2 pna. Chest tightness despite pna tx, with unremarkable cardiac enzymes and ECG, cardiology consulted for further cardiac work up and risk stratification.     # Atypical Chest tightness   - DDx underlying bronchiectasis vs pna vs restrictive pulmonary process in s/o kyphosis, vs palpitations induced vs anxiety??   - Currently without chest pain, KRAMER, orthopnea/PND/ palpitations. States chest tightness worsens with deep breaths   - Risk factors for CAD: T2DM and HTN. A1C 7.8 Nov 2021; LDL 76 in Jan 2021  - Cardiac enzymes wnl   - ECG without acute ST changes   - Stress echo without signs of ischemic disease Jan 2021  - Cardiac MRI (outpatient) Jan 2021 with combined diastolic and LV systolic dysfunction (LV EF 72%) and mild-mod AR  - TTE Nov 2021 with mod-severe AR, mild LA enlargement, LV RV systolic function wnl, mild grade 1 diastolic dysfunction, and normal pulmonary pressures 26 mmHg and RA pressure 10mmHg   - Respiratory tx regimen per pulm     - Rec:   - conservative management at this time given atypical symptoms that are very unlikely related to underlying cardiac disease. Spoke with pt's outpatient cardiologist Dr. Rico Glover, who agrees that pt has had long-standing atypical chest tightness and discomfort. With recent normal stress echo, he would also favor conservative management at this time. No further cardiac work up or risk stratification recommended at this time.     # Paroxysmal a.fib   - C/w Cardizem 60mg BID & Eliquis 5mg BID   - Rate controlled at this time  - TSH wnl     # Palpitations  - On Mexiletine 200mg TID as per EP (seen in previous hospital stay). Pt subjectively reports breathing and ability to take in deep breaths much improved since starting medication  - TSH wnl   - No need for telemetry    Care discussed with fellow, attending, primary team, and Dr. Rico Glover.     Nadine Dunlap MD  PGY-2  Cardiology resident    71 y.o. F with tracheobronchomalacia s/p tracheobronchoplasty, severe persistent asthma, bronchiectasis, palpitations thought to be 2/2 PVCs started on Mexitil at [facility] with good response.  Known moderate to severe AI by echo with preserved LV function.  Presented for SOB, admitted for bronchiectasis exacerbation 2/2 PNA.   Reports episode chest tightness, typically with inspiration.  Has unremarkable troponins; no acute changes on ECG.  Ex. echo earlier this year showed no ischemia.  Cardiology consulted for further cardiac work up and risk stratification.       REC:  #1.  Atypical chest tightness - DDx. inclues underlying bronchiectasis vs pna vs restrictive pulmonary process in s/o kyphosis; states chest tightness worsens with deep breath.   Cardiac enzymes wnl,  ECG without acute ST changes, stress echo without signs of ischemic disease Jan 2021   - no evidence of active ischemia.   - favor conservative management at this time. Spoke with pt's outpatient cardiologist Dr. Rico Glover, who agrees that pt has had long-standing atypical chest tightness and discomfort. With recent normal stress echo, he would also favor conservative management at this time. No further cardiac work up or risk stratification recommended at this time.    #2.  Cardiac MRI (outpatient) Jan 2021 with combined diastolic and LV systolic dysfunction (LV EF 72%) - remains compensated    #3.  TTE Nov 2021 with mod-severe AR; no change since 2019    #4.  Paroxysmal a.fib   - C/w Cardizem 60mg BID & Eliquis 5mg BID   - Rate controlled at this time  - TSH wnl     #5.  Palpitations attributed to VPC on EP w/u at [facility]  - On mexiletine 200mg TID as per EP; would continue   - No need for telemetry at this time.    Care discussed with fellow, attending, primary team, and Dr. Rico Glover.       Nadine Dunlap MD  PGY-2, Cardiology resident     Plan discussed with cardiology student and fellow; patient seen and examined. Hx., exam and labs as above.  I agree with the assessment and recommendations.   Jono Ochoa M.D.  Cardiology Attending, Consult Service    For Cardiology consults and questions, all Cardiology service information can be found 24/7 on [email or web address], password: [email or web address]

## 2021-12-02 NOTE — PROGRESS NOTE ADULT - ATTENDING COMMENTS
-Cardio and pulmonary recs appreciated. No need for further inpatient work up of atypical chest discomfort. Seems to be related to current PNA.   -C/w cefepime since patient clinically improving despite normal val on sputum culture. Will repeat sputum culture per discussion with Dr. Liz.   -JOSE GUADALUPEOB to chair.

## 2021-12-02 NOTE — PROGRESS NOTE ADULT - SUBJECTIVE AND OBJECTIVE BOX
CHIEF COMPLAINT: f/up TBM, bronchiectasis, severe persistent asthma, CB, PNA-    Interval Events:    REVIEW OF SYSTEMS:  Constitutional: No fevers or chills. No weight loss. No fatigue or generalized malaise.  Eyes: No itching or discharge from the eyes  ENT: No ear pain. No ear discharge. No nasal congestion. No post nasal drip. No epistaxis. No throat pain. No sore throat. No difficulty swallowing.   CV: No chest pain. No palpitations. No lightheadedness or dizziness.   Resp: No dyspnea at rest. No dyspnea on exertion. No orthopnea. No wheezing. No cough. No stridor. No sputum production. No chest pain with respiration.  GI: No nausea. No vomiting. No diarrhea.  MSK: No joint pain or pain in any extremities  Integumentary: No skin lesions. No pedal edema.  Neurological: No gross motor weakness. No sensory changes.  [ ] All other systems negative  [ ] Unable to assess ROS because ________    OBJECTIVE:  ICU Vital Signs Last 24 Hrs  T(C): 36.9 (02 Dec 2021 04:49), Max: 36.9 (02 Dec 2021 04:49)  T(F): 98.4 (02 Dec 2021 04:49), Max: 98.4 (02 Dec 2021 04:49)  HR: 82 (02 Dec 2021 04:49) (69 - 84)  BP: 134/68 (02 Dec 2021 04:49) (125/66 - 168/67)  BP(mean): --  ABP: --  ABP(mean): --  RR: 18 (02 Dec 2021 04:49) (17 - 18)  SpO2: 94% (02 Dec 2021 04:49) (94% - 98%)         @ :  -   @ 07:00  --------------------------------------------------------  IN: 0 mL / OUT: 750 mL / NET: -750 mL     @ 07:  -   @ 05:40  --------------------------------------------------------  IN: 480 mL / OUT: 400 mL / NET: 80 mL      CAPILLARY BLOOD GLUCOSE      POCT Blood Glucose.: 100 mg/dL (01 Dec 2021 23:22)      PHYSICAL EXAM:  General: Awake, alert, oriented X 3.   HEENT: Atraumatic, normocephalic.                 Mallampatti Grade                 No nasal congestion.                No tonsillar or pharyngeal exudates.  Lymph Nodes: No palpable lymphadenopathy  Neck: No JVD. No carotid bruit.   Respiratory: Normal chest expansion                         Normal percussion                         Normal and equal air entry                         No wheeze, rhonchi or rales.  Cardiovascular: S1 S2 normal. No murmurs, rubs or gallops.   Abdomen: Soft, non-tender, non-distended. No organomegaly. Normoactive bowel sounds.  Extremities: Warm to touch. Peripheral pulse palpable. No pedal edema.   Skin: No rashes or skin lesions  Neurological: Motor and sensory examination equal and normal in all four extremities.  Psychiatry: Appropriate mood and affect.    HOSPITAL MEDICATIONS:  MEDICATIONS  (STANDING):  acetylcysteine 20%  Inhalation 4 milliLiter(s) Inhalation three times a day  ALBUTerol    90 MICROgram(s) HFA Inhaler 2 Puff(s) Inhalation every 6 hours  apixaban 5 milliGRAM(s) Oral every 12 hours  budesonide 160 MICROgram(s)/formoterol 4.5 MICROgram(s) Inhaler 2 Puff(s) Inhalation two times a day  cefepime   IVPB 1000 milliGRAM(s) IV Intermittent every 8 hours  cefepime   IVPB      chlorhexidine 4% Liquid 1 Application(s) Topical <User Schedule>  dextrose 40% Gel 15 Gram(s) Oral once  dextrose 5%. 1000 milliLiter(s) (50 mL/Hr) IV Continuous <Continuous>  dextrose 5%. 1000 milliLiter(s) (100 mL/Hr) IV Continuous <Continuous>  dextrose 5%. 1000 milliLiter(s) (100 mL/Hr) IV Continuous <Continuous>  dextrose 50% Injectable 25 Gram(s) IV Push once  dextrose 50% Injectable 12.5 Gram(s) IV Push once  dextrose 50% Injectable 25 Gram(s) IV Push once  diltiazem    Tablet 60 milliGRAM(s) Oral every 12 hours  glucagon  Injectable 1 milliGRAM(s) IntraMuscular once  glucagon  Injectable 1 milliGRAM(s) IntraMuscular once  guaiFENesin ER 1200 milliGRAM(s) Oral every 12 hours  insulin glargine Injectable (LANTUS) 15 Unit(s) SubCutaneous at bedtime  insulin lispro (ADMELOG) corrective regimen sliding scale   SubCutaneous three times a day before meals  insulin lispro (ADMELOG) corrective regimen sliding scale   SubCutaneous at bedtime  insulin lispro Injectable (ADMELOG) 2 Unit(s) SubCutaneous three times a day before meals  methylPREDNISolone 4 milliGRAM(s) Oral daily  mexiletine 200 milliGRAM(s) Oral three times a day  montelukast 10 milliGRAM(s) Oral daily  mupirocin 2% Nasal 1 Application(s) Nasal two times a day  pantoprazole    Tablet 40 milliGRAM(s) Oral before breakfast  polyethylene glycol 3350 17 Gram(s) Oral daily  sodium chloride 0.65% Nasal 1 Spray(s) Both Nostrils two times a day  tiotropium 18 MICROgram(s) Capsule 1 Capsule(s) Inhalation daily    MEDICATIONS  (PRN):  acetaminophen     Tablet .. 650 milliGRAM(s) Oral every 6 hours PRN Temp greater or equal to 38C (100.4F), Mild Pain (1 - 3)  aluminum hydroxide/magnesium hydroxide/simethicone Suspension 30 milliLiter(s) Oral every 4 hours PRN Dyspepsia  benzonatate 100 milliGRAM(s) Oral three times a day PRN Cough  melatonin 3 milliGRAM(s) Oral at bedtime PRN Insomnia  metoclopramide Injectable 10 milliGRAM(s) IV Push every 8 hours PRN nausea      LABS:                        10.8   11.79 )-----------( 220      ( 01 Dec 2021 07:10 )             34.9     12-    140  |  101  |  12  ----------------------------<  231<H>  4.0   |  27  |  0.65    Ca    8.8      01 Dec 2021 07:15  Phos  2.4     12-  Mg     2.1     12    TPro  6.0  /  Alb  3.3  /  TBili  0.2  /  DBili  x   /  AST  12  /  ALT  11  /  AlkPhos  94  12-      Urinalysis Basic - ( 2021 18:35 )    Color: Yellow / Appearance: Clear / S.031 / pH: x  Gluc: x / Ketone: Negative  / Bili: Negative / Urobili: Negative   Blood: x / Protein: 30 mg/dL / Nitrite: Negative   Leuk Esterase: Negative / RBC: 3 /hpf / WBC 1 /HPF   Sq Epi: x / Non Sq Epi: 0 /hpf / Bacteria: Negative            MICROBIOLOGY:     RADIOLOGY:  [ ] Reviewed and interpreted by me    Point of Care Ultrasound Findings:    PFT:    EKG: CHIEF COMPLAINT: f/up TBM, bronchiectasis, severe persistent asthma, CB, PNA-better breathing over all    Interval Events: vest rx from home    REVIEW OF SYSTEMS:  Constitutional: No fevers or chills. No weight loss. No fatigue or generalized malaise.  Eyes: No itching or discharge from the eyes  ENT: No ear pain. No ear discharge. No nasal congestion. No post nasal drip. No epistaxis. No throat pain. No sore throat. No difficulty swallowing.   CV: No chest pain. No palpitations. No lightheadedness or dizziness.   Resp: No dyspnea at rest. No dyspnea on exertion. No orthopnea. No wheezing. + cough. No stridor. No sputum production. No chest pain with respiration.  GI: No nausea. No vomiting. No diarrhea.  MSK: No joint pain or pain in any extremities  Integumentary: No skin lesions. No pedal edema.  Neurological: No gross motor weakness. No sensory changes.  +[ ] All other systems negative  [ ] Unable to assess ROS because ________    OBJECTIVE:  ICU Vital Signs Last 24 Hrs  T(C): 36.9 (02 Dec 2021 04:49), Max: 36.9 (02 Dec 2021 04:49)  T(F): 98.4 (02 Dec 2021 04:49), Max: 98.4 (02 Dec 2021 04:49)  HR: 82 (02 Dec 2021 04:49) (69 - 84)  BP: 134/68 (02 Dec 2021 04:49) (125/66 - 168/67)  BP(mean): --  ABP: --  ABP(mean): --  RR: 18 (02 Dec 2021 04:49) (17 - 18)  SpO2: 94% (02 Dec 2021 04:49) (94% - 98%)         @ :  -   @ 07:00  --------------------------------------------------------  IN: 0 mL / OUT: 750 mL / NET: -750 mL     @ :  -   @ 05:40  --------------------------------------------------------  IN: 480 mL / OUT: 400 mL / NET: 80 mL      CAPILLARY BLOOD GLUCOSE      POCT Blood Glucose.: 100 mg/dL (01 Dec 2021 23:22)      PHYSICAL EXAM: NAD in bed  General: Awake, alert, oriented X 3.   HEENT: Atraumatic, normocephalic.                 Mallampatti Grade 2                 No nasal congestion.                No tonsillar or pharyngeal exudates.  Lymph Nodes: No palpable lymphadenopathy  Neck: No JVD. No carotid bruit.   Respiratory: Normal chest expansion                         Normal percussion                         Normal and equal air entry                         No wheeze, rhonchi or rales.  Cardiovascular: S1 S2 normal. No murmurs, rubs or gallops.   Abdomen: Soft, non-tender, non-distended. No organomegaly. Normoactive bowel sounds.  Extremities: Warm to touch. Peripheral pulse palpable. No pedal edema.   Skin: No rashes or skin lesions  Neurological: Motor and sensory examination equal and normal in all four extremities.  Psychiatry: Appropriate mood and affect.    HOSPITAL MEDICATIONS:  MEDICATIONS  (STANDING):  acetylcysteine 20%  Inhalation 4 milliLiter(s) Inhalation three times a day  ALBUTerol    90 MICROgram(s) HFA Inhaler 2 Puff(s) Inhalation every 6 hours  apixaban 5 milliGRAM(s) Oral every 12 hours  budesonide 160 MICROgram(s)/formoterol 4.5 MICROgram(s) Inhaler 2 Puff(s) Inhalation two times a day  cefepime   IVPB 1000 milliGRAM(s) IV Intermittent every 8 hours  cefepime   IVPB      chlorhexidine 4% Liquid 1 Application(s) Topical <User Schedule>  dextrose 40% Gel 15 Gram(s) Oral once  dextrose 5%. 1000 milliLiter(s) (50 mL/Hr) IV Continuous <Continuous>  dextrose 5%. 1000 milliLiter(s) (100 mL/Hr) IV Continuous <Continuous>  dextrose 5%. 1000 milliLiter(s) (100 mL/Hr) IV Continuous <Continuous>  dextrose 50% Injectable 25 Gram(s) IV Push once  dextrose 50% Injectable 12.5 Gram(s) IV Push once  dextrose 50% Injectable 25 Gram(s) IV Push once  diltiazem    Tablet 60 milliGRAM(s) Oral every 12 hours  glucagon  Injectable 1 milliGRAM(s) IntraMuscular once  glucagon  Injectable 1 milliGRAM(s) IntraMuscular once  guaiFENesin ER 1200 milliGRAM(s) Oral every 12 hours  insulin glargine Injectable (LANTUS) 15 Unit(s) SubCutaneous at bedtime  insulin lispro (ADMELOG) corrective regimen sliding scale   SubCutaneous three times a day before meals  insulin lispro (ADMELOG) corrective regimen sliding scale   SubCutaneous at bedtime  insulin lispro Injectable (ADMELOG) 2 Unit(s) SubCutaneous three times a day before meals  methylPREDNISolone 4 milliGRAM(s) Oral daily  mexiletine 200 milliGRAM(s) Oral three times a day  montelukast 10 milliGRAM(s) Oral daily  mupirocin 2% Nasal 1 Application(s) Nasal two times a day  pantoprazole    Tablet 40 milliGRAM(s) Oral before breakfast  polyethylene glycol 3350 17 Gram(s) Oral daily  sodium chloride 0.65% Nasal 1 Spray(s) Both Nostrils two times a day  tiotropium 18 MICROgram(s) Capsule 1 Capsule(s) Inhalation daily    MEDICATIONS  (PRN):  acetaminophen     Tablet .. 650 milliGRAM(s) Oral every 6 hours PRN Temp greater or equal to 38C (100.4F), Mild Pain (1 - 3)  aluminum hydroxide/magnesium hydroxide/simethicone Suspension 30 milliLiter(s) Oral every 4 hours PRN Dyspepsia  benzonatate 100 milliGRAM(s) Oral three times a day PRN Cough  melatonin 3 milliGRAM(s) Oral at bedtime PRN Insomnia  metoclopramide Injectable 10 milliGRAM(s) IV Push every 8 hours PRN nausea      LABS:                        10.8   11.79 )-----------( 220      ( 01 Dec 2021 07:10 )             34.9     12-    140  |  101  |  12  ----------------------------<  231<H>  4.0   |  27  |  0.65    Ca    8.8      01 Dec 2021 07:15  Phos  2.4     12-  Mg     2.1     12-    TPro  6.0  /  Alb  3.3  /  TBili  0.2  /  DBili  x   /  AST  12  /  ALT  11  /  AlkPhos  94  12-01      Urinalysis Basic - ( 2021 18:35 )    Color: Yellow / Appearance: Clear / S.031 / pH: x  Gluc: x / Ketone: Negative  / Bili: Negative / Urobili: Negative   Blood: x / Protein: 30 mg/dL / Nitrite: Negative   Leuk Esterase: Negative / RBC: 3 /hpf / WBC 1 /HPF   Sq Epi: x / Non Sq Epi: 0 /hpf / Bacteria: Negative            MICROBIOLOGY:     RADIOLOGY:  [ ] Reviewed and interpreted by me    Point of Care Ultrasound Findings:    PFT:    EKG:

## 2021-12-03 LAB
ALBUMIN SERPL ELPH-MCNC: 3.2 G/DL — LOW (ref 3.3–5)
ALP SERPL-CCNC: 85 U/L — SIGNIFICANT CHANGE UP (ref 40–120)
ALT FLD-CCNC: 15 U/L — SIGNIFICANT CHANGE UP (ref 10–45)
ANION GAP SERPL CALC-SCNC: 11 MMOL/L — SIGNIFICANT CHANGE UP (ref 5–17)
AST SERPL-CCNC: 15 U/L — SIGNIFICANT CHANGE UP (ref 10–40)
BASOPHILS # BLD AUTO: 0.04 K/UL — SIGNIFICANT CHANGE UP (ref 0–0.2)
BASOPHILS NFR BLD AUTO: 0.5 % — SIGNIFICANT CHANGE UP (ref 0–2)
BILIRUB SERPL-MCNC: 0.1 MG/DL — LOW (ref 0.2–1.2)
BUN SERPL-MCNC: 14 MG/DL — SIGNIFICANT CHANGE UP (ref 7–23)
CALCIUM SERPL-MCNC: 8.6 MG/DL — SIGNIFICANT CHANGE UP (ref 8.4–10.5)
CHLORIDE SERPL-SCNC: 102 MMOL/L — SIGNIFICANT CHANGE UP (ref 96–108)
CO2 SERPL-SCNC: 26 MMOL/L — SIGNIFICANT CHANGE UP (ref 22–31)
CREAT SERPL-MCNC: 0.68 MG/DL — SIGNIFICANT CHANGE UP (ref 0.5–1.3)
CULTURE RESULTS: SIGNIFICANT CHANGE UP
CULTURE RESULTS: SIGNIFICANT CHANGE UP
EOSINOPHIL # BLD AUTO: 0.14 K/UL — SIGNIFICANT CHANGE UP (ref 0–0.5)
EOSINOPHIL NFR BLD AUTO: 1.7 % — SIGNIFICANT CHANGE UP (ref 0–6)
GLUCOSE SERPL-MCNC: 216 MG/DL — HIGH (ref 70–99)
HCT VFR BLD CALC: 34.3 % — LOW (ref 34.5–45)
HGB BLD-MCNC: 10.4 G/DL — LOW (ref 11.5–15.5)
IMM GRANULOCYTES NFR BLD AUTO: 1.2 % — SIGNIFICANT CHANGE UP (ref 0–1.5)
LYMPHOCYTES # BLD AUTO: 1.69 K/UL — SIGNIFICANT CHANGE UP (ref 1–3.3)
LYMPHOCYTES # BLD AUTO: 20.8 % — SIGNIFICANT CHANGE UP (ref 13–44)
MAGNESIUM SERPL-MCNC: 2.1 MG/DL — SIGNIFICANT CHANGE UP (ref 1.6–2.6)
MCHC RBC-ENTMCNC: 23.7 PG — LOW (ref 27–34)
MCHC RBC-ENTMCNC: 30.3 GM/DL — LOW (ref 32–36)
MCV RBC AUTO: 78.1 FL — LOW (ref 80–100)
MONOCYTES # BLD AUTO: 1.19 K/UL — HIGH (ref 0–0.9)
MONOCYTES NFR BLD AUTO: 14.6 % — HIGH (ref 2–14)
NEUTROPHILS # BLD AUTO: 4.97 K/UL — SIGNIFICANT CHANGE UP (ref 1.8–7.4)
NEUTROPHILS NFR BLD AUTO: 61.2 % — SIGNIFICANT CHANGE UP (ref 43–77)
NIGHT BLUE STAIN TISS: SIGNIFICANT CHANGE UP
NRBC # BLD: 0 /100 WBCS — SIGNIFICANT CHANGE UP (ref 0–0)
NT-PROBNP SERPL-SCNC: 103 PG/ML — SIGNIFICANT CHANGE UP (ref 0–300)
PHOSPHATE SERPL-MCNC: 3.1 MG/DL — SIGNIFICANT CHANGE UP (ref 2.5–4.5)
PLATELET # BLD AUTO: 245 K/UL — SIGNIFICANT CHANGE UP (ref 150–400)
POTASSIUM SERPL-MCNC: 4.2 MMOL/L — SIGNIFICANT CHANGE UP (ref 3.5–5.3)
POTASSIUM SERPL-SCNC: 4.2 MMOL/L — SIGNIFICANT CHANGE UP (ref 3.5–5.3)
PROT SERPL-MCNC: 5.9 G/DL — LOW (ref 6–8.3)
RBC # BLD: 4.39 M/UL — SIGNIFICANT CHANGE UP (ref 3.8–5.2)
RBC # FLD: 15.7 % — HIGH (ref 10.3–14.5)
S PNEUM AG UR QL: NEGATIVE — SIGNIFICANT CHANGE UP
SODIUM SERPL-SCNC: 139 MMOL/L — SIGNIFICANT CHANGE UP (ref 135–145)
SPECIMEN SOURCE: SIGNIFICANT CHANGE UP
WBC # BLD: 8.13 K/UL — SIGNIFICANT CHANGE UP (ref 3.8–10.5)
WBC # FLD AUTO: 8.13 K/UL — SIGNIFICANT CHANGE UP (ref 3.8–10.5)

## 2021-12-03 PROCEDURE — 99232 SBSQ HOSP IP/OBS MODERATE 35: CPT | Mod: GC

## 2021-12-03 PROCEDURE — 99232 SBSQ HOSP IP/OBS MODERATE 35: CPT

## 2021-12-03 RX ORDER — ACETAMINOPHEN 500 MG
650 TABLET ORAL ONCE
Refills: 0 | Status: COMPLETED | OUTPATIENT
Start: 2021-12-03 | End: 2021-12-03

## 2021-12-03 RX ORDER — SODIUM CHLORIDE 9 MG/ML
4 INJECTION INTRAMUSCULAR; INTRAVENOUS; SUBCUTANEOUS
Refills: 0 | Status: DISCONTINUED | OUTPATIENT
Start: 2021-12-03 | End: 2021-12-06

## 2021-12-03 RX ORDER — MULTIVIT WITH MIN/MFOLATE/K2 340-15/3 G
1 POWDER (GRAM) ORAL ONCE
Refills: 0 | Status: COMPLETED | OUTPATIENT
Start: 2021-12-03 | End: 2021-12-03

## 2021-12-03 RX ADMIN — TIOTROPIUM BROMIDE 1 CAPSULE(S): 18 CAPSULE ORAL; RESPIRATORY (INHALATION) at 11:47

## 2021-12-03 RX ADMIN — APIXABAN 5 MILLIGRAM(S): 2.5 TABLET, FILM COATED ORAL at 05:42

## 2021-12-03 RX ADMIN — MUPIROCIN 1 APPLICATION(S): 20 OINTMENT TOPICAL at 17:55

## 2021-12-03 RX ADMIN — Medication 100 MILLIGRAM(S): at 14:31

## 2021-12-03 RX ADMIN — Medication 1 APPLICATION(S): at 14:32

## 2021-12-03 RX ADMIN — MEXILETINE HYDROCHLORIDE 200 MILLIGRAM(S): 150 CAPSULE ORAL at 21:20

## 2021-12-03 RX ADMIN — ALBUTEROL 2 PUFF(S): 90 AEROSOL, METERED ORAL at 17:40

## 2021-12-03 RX ADMIN — Medication 1 SPRAY(S): at 17:59

## 2021-12-03 RX ADMIN — Medication 4 MILLIGRAM(S): at 05:42

## 2021-12-03 RX ADMIN — Medication 8: at 12:57

## 2021-12-03 RX ADMIN — Medication 1 APPLICATION(S): at 05:44

## 2021-12-03 RX ADMIN — Medication 1 SPRAY(S): at 05:44

## 2021-12-03 RX ADMIN — BUDESONIDE AND FORMOTEROL FUMARATE DIHYDRATE 2 PUFF(S): 160; 4.5 AEROSOL RESPIRATORY (INHALATION) at 17:33

## 2021-12-03 RX ADMIN — MEXILETINE HYDROCHLORIDE 200 MILLIGRAM(S): 150 CAPSULE ORAL at 14:31

## 2021-12-03 RX ADMIN — Medication 4 MILLILITER(S): at 05:42

## 2021-12-03 RX ADMIN — MEXILETINE HYDROCHLORIDE 200 MILLIGRAM(S): 150 CAPSULE ORAL at 05:42

## 2021-12-03 RX ADMIN — CHLORHEXIDINE GLUCONATE 1 APPLICATION(S): 213 SOLUTION TOPICAL at 05:43

## 2021-12-03 RX ADMIN — ALBUTEROL 2 PUFF(S): 90 AEROSOL, METERED ORAL at 05:42

## 2021-12-03 RX ADMIN — Medication 325 MILLIGRAM(S): at 08:58

## 2021-12-03 RX ADMIN — INSULIN GLARGINE 12 UNIT(S): 100 INJECTION, SOLUTION SUBCUTANEOUS at 21:47

## 2021-12-03 RX ADMIN — Medication 2 UNIT(S): at 08:56

## 2021-12-03 RX ADMIN — Medication 60 MILLIGRAM(S): at 05:42

## 2021-12-03 RX ADMIN — Medication 1200 MILLIGRAM(S): at 05:42

## 2021-12-03 RX ADMIN — ALBUTEROL 2 PUFF(S): 90 AEROSOL, METERED ORAL at 23:35

## 2021-12-03 RX ADMIN — BUDESONIDE AND FORMOTEROL FUMARATE DIHYDRATE 2 PUFF(S): 160; 4.5 AEROSOL RESPIRATORY (INHALATION) at 05:43

## 2021-12-03 RX ADMIN — CEFEPIME 100 MILLIGRAM(S): 1 INJECTION, POWDER, FOR SOLUTION INTRAMUSCULAR; INTRAVENOUS at 05:41

## 2021-12-03 RX ADMIN — SENNA PLUS 2 TABLET(S): 8.6 TABLET ORAL at 21:20

## 2021-12-03 RX ADMIN — Medication 60 MILLIGRAM(S): at 17:56

## 2021-12-03 RX ADMIN — POLYETHYLENE GLYCOL 3350 17 GRAM(S): 17 POWDER, FOR SOLUTION ORAL at 10:32

## 2021-12-03 RX ADMIN — CEFEPIME 100 MILLIGRAM(S): 1 INJECTION, POWDER, FOR SOLUTION INTRAMUSCULAR; INTRAVENOUS at 21:21

## 2021-12-03 RX ADMIN — Medication 650 MILLIGRAM(S): at 12:59

## 2021-12-03 RX ADMIN — Medication 30 MILLILITER(S): at 10:31

## 2021-12-03 RX ADMIN — APIXABAN 5 MILLIGRAM(S): 2.5 TABLET, FILM COATED ORAL at 17:56

## 2021-12-03 RX ADMIN — Medication 1 APPLICATION(S): at 21:21

## 2021-12-03 RX ADMIN — Medication 5 MILLIGRAM(S): at 18:58

## 2021-12-03 RX ADMIN — Medication 2 UNIT(S): at 12:58

## 2021-12-03 RX ADMIN — CEFEPIME 100 MILLIGRAM(S): 1 INJECTION, POWDER, FOR SOLUTION INTRAMUSCULAR; INTRAVENOUS at 14:31

## 2021-12-03 RX ADMIN — MONTELUKAST 10 MILLIGRAM(S): 4 TABLET, CHEWABLE ORAL at 10:30

## 2021-12-03 RX ADMIN — Medication 2 UNIT(S): at 17:55

## 2021-12-03 RX ADMIN — SODIUM CHLORIDE 4 MILLILITER(S): 9 INJECTION INTRAMUSCULAR; INTRAVENOUS; SUBCUTANEOUS at 17:40

## 2021-12-03 RX ADMIN — PANTOPRAZOLE SODIUM 40 MILLIGRAM(S): 20 TABLET, DELAYED RELEASE ORAL at 05:43

## 2021-12-03 RX ADMIN — Medication 650 MILLIGRAM(S): at 11:54

## 2021-12-03 RX ADMIN — Medication 1200 MILLIGRAM(S): at 17:57

## 2021-12-03 RX ADMIN — ALBUTEROL 2 PUFF(S): 90 AEROSOL, METERED ORAL at 11:07

## 2021-12-03 RX ADMIN — Medication 2: at 08:56

## 2021-12-03 RX ADMIN — Medication 4: at 17:54

## 2021-12-03 RX ADMIN — MUPIROCIN 1 APPLICATION(S): 20 OINTMENT TOPICAL at 05:41

## 2021-12-03 RX ADMIN — Medication 1 BOTTLE: at 22:14

## 2021-12-03 RX ADMIN — SODIUM CHLORIDE 4 MILLILITER(S): 9 INJECTION INTRAMUSCULAR; INTRAVENOUS; SUBCUTANEOUS at 23:35

## 2021-12-03 NOTE — DIETITIAN INITIAL EVALUATION ADULT. - SIGNS/SYMPTOMS
as evidenced by reported poor PO intake of meals in-house as evidenced by HbA1c 7.8%, fluctuating glucose fingersticks in-house

## 2021-12-03 NOTE — PROGRESS NOTE ADULT - SUBJECTIVE AND OBJECTIVE BOX
Bozena UlyssesLorenzoRadha | PGY-1  Internal Medicine  Pager: [phone] NS/ [phone] [facility]    OVERNIGHT EVENTS: no overnight events      SUBJECTIVE: Patient was examined at bedside this morning. Appeared comfortable. Overnight vitals and monitoring results were reviewed. Reporting some generalized back pain. Denied chest pain, SOB, abdominal pain, vomiting, diarrhea, constipation.       MEDICATIONS  (STANDING):  ALBUTerol    90 MICROgram(s) HFA Inhaler 2 Puff(s) Inhalation every 6 hours  apixaban 5 milliGRAM(s) Oral every 12 hours  budesonide 160 MICROgram(s)/formoterol 4.5 MICROgram(s) Inhaler 2 Puff(s) Inhalation two times a day  cefepime   IVPB 1000 milliGRAM(s) IV Intermittent every 8 hours  cefepime   IVPB      chlorhexidine 4% Liquid 1 Application(s) Topical <User Schedule>  dextrose 40% Gel 15 Gram(s) Oral once  dextrose 5%. 1000 milliLiter(s) (50 mL/Hr) IV Continuous <Continuous>  dextrose 5%. 1000 milliLiter(s) (100 mL/Hr) IV Continuous <Continuous>  dextrose 5%. 1000 milliLiter(s) (100 mL/Hr) IV Continuous <Continuous>  dextrose 50% Injectable 25 Gram(s) IV Push once  dextrose 50% Injectable 12.5 Gram(s) IV Push once  dextrose 50% Injectable 25 Gram(s) IV Push once  diltiazem    Tablet 60 milliGRAM(s) Oral every 12 hours  ferrous    sulfate 325 milliGRAM(s) Oral <User Schedule>  glucagon  Injectable 1 milliGRAM(s) IntraMuscular once  glucagon  Injectable 1 milliGRAM(s) IntraMuscular once  guaiFENesin ER 1200 milliGRAM(s) Oral every 12 hours  insulin glargine Injectable (LANTUS) 12 Unit(s) SubCutaneous at bedtime  insulin lispro (ADMELOG) corrective regimen sliding scale   SubCutaneous three times a day before meals  insulin lispro (ADMELOG) corrective regimen sliding scale   SubCutaneous at bedtime  insulin lispro Injectable (ADMELOG) 2 Unit(s) SubCutaneous three times a day before meals  methylPREDNISolone 4 milliGRAM(s) Oral daily  mexiletine 200 milliGRAM(s) Oral three times a day  montelukast 10 milliGRAM(s) Oral daily  mupirocin 2% Nasal 1 Application(s) Nasal two times a day  pantoprazole    Tablet 40 milliGRAM(s) Oral before breakfast  petrolatum white Ointment 1 Application(s) Topical three times a day  polyethylene glycol 3350 17 Gram(s) Oral daily  senna 2 Tablet(s) Oral at bedtime  sodium chloride 0.65% Nasal 1 Spray(s) Both Nostrils two times a day  tiotropium 18 MICROgram(s) Capsule 1 Capsule(s) Inhalation daily    MEDICATIONS  (PRN):  acetaminophen     Tablet .. 650 milliGRAM(s) Oral every 6 hours PRN Temp greater or equal to 38C (100.4F), Mild Pain (1 - 3)  aluminum hydroxide/magnesium hydroxide/simethicone Suspension 30 milliLiter(s) Oral every 4 hours PRN Dyspepsia  benzonatate 100 milliGRAM(s) Oral three times a day PRN Cough  melatonin 3 milliGRAM(s) Oral at bedtime PRN Insomnia  metoclopramide Injectable 10 milliGRAM(s) IV Push every 8 hours PRN nausea        T(F): 98.3 (12-03-21 @ 04:20), Max: 98.3 (12-03-21 @ 04:20)  HR: 95 (12-03-21 @ 06:13) (62 - 95)  BP: 154/68 (12-03-21 @ 04:20) (134/76 - 154/68)  BP(mean): --  RR: 18 (12-03-21 @ 04:20) (18 - 18)  SpO2: 99% (12-03-21 @ 06:13) (94% - 99%)    PHYSICAL EXAM:     GENERAL: NAD, lying in bed comfortably  HEAD:  Atraumatic, Normocephalic  EYES:  R eye injected, L eye false eye  CHEST/LUNG: on RA. Crackles b/l lower lobes improved from previous exam. Cough on deep inspiration.    HEART: Regular rate and rhythm; No murmurs, rubs, or gallops, normal S1/S2  ABDOMEN: normal bowel sounds; Soft, nontender, nondistended, no organomegaly.  + colostomy   EXTREMITIES:  2+ Peripheral Pulses, brisk capillary refill. No clubbing, cyanosis, or edema  MSK: No gross deformities noted   Neurological:  A&Ox3, no focal deficits     TELEMETRY:    LABS:                        10.4   8.13  )-----------( 245      ( 03 Dec 2021 07:25 )             34.3     12-03    139  |  102  |  14  ----------------------------<  216<H>  4.2   |  26  |  0.68    Ca    8.6      03 Dec 2021 07:25  Phos  3.1     12-03  Mg     2.1     12-03    TPro  5.9<L>  /  Alb  3.2<L>  /  TBili  0.1<L>  /  DBili  x   /  AST  15  /  ALT  15  /  AlkPhos  85  12-03    CARDIAC MARKERS ( 02 Dec 2021 07:31 )  x     / x     / 125 U/L / x     / 1.4 ng/mL          Creatinine Trend: 0.68<--, 0.58<--, 0.65<--, 0.70<--, 0.81<--, 0.73<--  I&O's Summary    02 Dec 2021 07:01  -  03 Dec 2021 07:00  --------------------------------------------------------  IN: 240 mL / OUT: 600 mL / NET: -360 mL      BNPSerum Pro-Brain Natriuretic Peptide: 103 pg/mL (12-03 @ 07:25)      RADIOLOGY & ADDITIONAL STUDIES:

## 2021-12-03 NOTE — DIETITIAN INITIAL EVALUATION ADULT. - PROBLEM SELECTOR PLAN 2
#Tracheobronchomalacia s/p tracheobronchoplasty and asthma hx  -pulm called by ED (Dr. Allison)  -c/w breo elipta, singulair, spiriva

## 2021-12-03 NOTE — PROGRESS NOTE ADULT - TIME BILLING
as above: ID f/up-await final sputum results to de-esculate rx-? MURF  multifactorial dyspnea-TBM, CB, severe persistent asthma, PNA, debility, anemia, CAD--O2 NC sat above 90%  PNA-f/up sputum cx=MURF--cefepime D5 of ?7  (ID formal luis-RISHABH Liz)  KAS-ME-rlcrpwfz/vest rx, incentive spirometry  asthma-medrol 4mg, spiriva/symbicort, duoneb q 6, singulair 10 q hs,  allergy-claritin/flonase  gerd-ppi  abnormal CT-nodule-f/up 3 months                    cards-on mult rx-to continue  PT-OOB    DVT prophylaxis  DC planning    Eulalio Allison MD-Pulmonary   [phone] as above: slow improvement-ID f/up-await final sputum results to de-esculate rx-? MURF--additional sputum pending (fungal/afb etc)  multifactorial dyspnea-TBM, CB, severe persistent asthma, PNA, debility, anemia, CAD--O2 NC sat above 90%  PNA-f/up sputum cx=MURF--cefepime D6 of ?7  (ID formal Ketan Liz)  GEY-RA-gqzrthrk/vest rx, incentive spirometry  asthma-medrol 4mg, spiriva/symbicort, duoneb q 6, singulair 10 q hs,  allergy-claritin/flonase  gerd-ppi  abnormal CT-nodule-f/up 3 months                    cards-on mult rx-to continue  PT-OOB    DVT prophylaxis  DC planning next 24 hrs    Eulalio Allison MD-Pulmonary   [phone]

## 2021-12-03 NOTE — DIETITIAN INITIAL EVALUATION ADULT. - ETIOLOGY
related to dislike of institutionalized foods/personal food preferences related to suboptimal glycemic control

## 2021-12-03 NOTE — PROGRESS NOTE ADULT - ASSESSMENT
72 y/o F w/tracheobronchomalacia s/p tracheobronchoplasty, severe persistent asthma, bronchiectasis, and colon cancer s/p colectomy admitted with likely exacerbation of bronchiectasis due to pneumonia.    - Supplemental O2 as needed goal O2 sat >= 90%  - Broad spectrum abx including pseudomonal coverage  - Airway clearance, acapella device, bronchodilators, chest PT  - Continue home asthma regimen  - Repeat CT scan in 3 months for follow up of new pulmonary nodule  ***************************************  11/30-better over all                                                                                    SEE Below:  12/1-better but still sx, ID evaln-await sputum results  12/2-better over all-less sx  72 y/o F w/tracheobronchomalacia s/p tracheobronchoplasty, severe persistent asthma, bronchiectasis, and colon cancer s/p colectomy admitted with likely exacerbation of bronchiectasis due to pneumonia.    - Supplemental O2 as needed goal O2 sat >= 90%  - Broad spectrum abx including pseudomonal coverage  - Airway clearance, acapella device, bronchodilators, chest PT  - Continue home asthma regimen  - Repeat CT scan in 3 months for follow up of new pulmonary nodule  ***************************************  11/30-better over all                                                                                    SEE Below:  12/1-better but still sx, ID evaln-await sputum results  12/2-better over all-less sx  12/3-still improving-completing ABX

## 2021-12-03 NOTE — PROGRESS NOTE ADULT - PROBLEM SELECTOR PLAN 1
#chest discomfort and SOB  Echo 11/2021 - no significant changes from previous echo. Mild diastolic dysfunction w/ normal LV . Hx of pseudomonas in Feb 2020.  Received suraj 1g in ED.  - f/u blood and sputum cx, urine legionella   - CT chest showed lingula consolidation, LLL opacity PNA vs atelectasis, impacted distal bronchioles, 9mm RLL nodule needs f/u in 3 months   - c/w cefipime (11/29- )  - ID consult  - wean O2  - Cards consult for continued chest discomfort rec'd conservative management   - repeat sputum culture #chest discomfort and SOB  Echo 11/2021 - no significant changes from previous echo. Mild diastolic dysfunction w/ normal LV . Hx of pseudomonas in Feb 2020.  Received suraj 1g in ED.  - f/u blood and sputum cx, urine legionella   - CT chest showed lingula consolidation, LLL opacity PNA vs atelectasis, impacted distal bronchioles, 9mm RLL nodule needs f/u in 3 months   - c/w cefipime (11/29- )  - ID consult  - wean O2  - Cards consult for continued chest discomfort rec'd conservative management   - repeat sputum culture; fungal and AFB sputum culture

## 2021-12-03 NOTE — PROGRESS NOTE ADULT - PROBLEM SELECTOR PLAN 4
#IDDM  Takes lantus 15u qhs, and humalog ISS, victoza, at home  Currently with nausea and poor PO intake,   - ISS, lantus 12u qhs

## 2021-12-03 NOTE — PROGRESS NOTE ADULT - SUBJECTIVE AND OBJECTIVE BOX
CHIEF COMPLAINT: f/up TBM, bronchiectasis, severe persistent asthma, CB, PNA-    Interval Events:    REVIEW OF SYSTEMS:  Constitutional: No fevers or chills. No weight loss. No fatigue or generalized malaise.  Eyes: No itching or discharge from the eyes  ENT: No ear pain. No ear discharge. No nasal congestion. No post nasal drip. No epistaxis. No throat pain. No sore throat. No difficulty swallowing.   CV: No chest pain. No palpitations. No lightheadedness or dizziness.   Resp: No dyspnea at rest. No dyspnea on exertion. No orthopnea. No wheezing. No cough. No stridor. No sputum production. No chest pain with respiration.  GI: No nausea. No vomiting. No diarrhea.  MSK: No joint pain or pain in any extremities  Integumentary: No skin lesions. No pedal edema.  Neurological: No gross motor weakness. No sensory changes.  [ ] All other systems negative  [ ] Unable to assess ROS because ________    OBJECTIVE:  ICU Vital Signs Last 24 Hrs  T(C): 36.8 (03 Dec 2021 04:20), Max: 36.8 (02 Dec 2021 13:54)  T(F): 98.3 (03 Dec 2021 04:20), Max: 98.3 (03 Dec 2021 04:20)  HR: 62 (03 Dec 2021 04:20) (62 - 94)  BP: 154/68 (03 Dec 2021 04:20) (134/76 - 154/68)  BP(mean): --  ABP: --  ABP(mean): --  RR: 18 (03 Dec 2021 04:20) (18 - 18)  SpO2: 94% (03 Dec 2021 04:20) (94% - 98%)        12-01 @ 07:01  -  12-02 @ 07:00  --------------------------------------------------------  IN: 580 mL / OUT: 800 mL / NET: -220 mL    12-02 @ 07:01  -  12-03 @ 05:55  --------------------------------------------------------  IN: 240 mL / OUT: 600 mL / NET: -360 mL      CAPILLARY BLOOD GLUCOSE      POCT Blood Glucose.: 138 mg/dL (03 Dec 2021 00:34)      PHYSICAL EXAM:  General: Awake, alert, oriented X 3.   HEENT: Atraumatic, normocephalic.                 Mallampatti Grade                 No nasal congestion.                No tonsillar or pharyngeal exudates.  Lymph Nodes: No palpable lymphadenopathy  Neck: No JVD. No carotid bruit.   Respiratory: Normal chest expansion                         Normal percussion                         Normal and equal air entry                         No wheeze, rhonchi or rales.  Cardiovascular: S1 S2 normal. No murmurs, rubs or gallops.   Abdomen: Soft, non-tender, non-distended. No organomegaly. Normoactive bowel sounds.  Extremities: Warm to touch. Peripheral pulse palpable. No pedal edema.   Skin: No rashes or skin lesions  Neurological: Motor and sensory examination equal and normal in all four extremities.  Psychiatry: Appropriate mood and affect.    HOSPITAL MEDICATIONS:  MEDICATIONS  (STANDING):  ALBUTerol    90 MICROgram(s) HFA Inhaler 2 Puff(s) Inhalation every 6 hours  apixaban 5 milliGRAM(s) Oral every 12 hours  budesonide 160 MICROgram(s)/formoterol 4.5 MICROgram(s) Inhaler 2 Puff(s) Inhalation two times a day  cefepime   IVPB 1000 milliGRAM(s) IV Intermittent every 8 hours  cefepime   IVPB      chlorhexidine 4% Liquid 1 Application(s) Topical <User Schedule>  dextrose 40% Gel 15 Gram(s) Oral once  dextrose 5%. 1000 milliLiter(s) (50 mL/Hr) IV Continuous <Continuous>  dextrose 5%. 1000 milliLiter(s) (100 mL/Hr) IV Continuous <Continuous>  dextrose 5%. 1000 milliLiter(s) (100 mL/Hr) IV Continuous <Continuous>  dextrose 50% Injectable 25 Gram(s) IV Push once  dextrose 50% Injectable 12.5 Gram(s) IV Push once  dextrose 50% Injectable 25 Gram(s) IV Push once  diltiazem    Tablet 60 milliGRAM(s) Oral every 12 hours  ferrous    sulfate 325 milliGRAM(s) Oral <User Schedule>  glucagon  Injectable 1 milliGRAM(s) IntraMuscular once  glucagon  Injectable 1 milliGRAM(s) IntraMuscular once  guaiFENesin ER 1200 milliGRAM(s) Oral every 12 hours  insulin glargine Injectable (LANTUS) 12 Unit(s) SubCutaneous at bedtime  insulin lispro (ADMELOG) corrective regimen sliding scale   SubCutaneous at bedtime  insulin lispro (ADMELOG) corrective regimen sliding scale   SubCutaneous three times a day before meals  insulin lispro Injectable (ADMELOG) 2 Unit(s) SubCutaneous three times a day before meals  methylPREDNISolone 4 milliGRAM(s) Oral daily  mexiletine 200 milliGRAM(s) Oral three times a day  montelukast 10 milliGRAM(s) Oral daily  mupirocin 2% Nasal 1 Application(s) Nasal two times a day  pantoprazole    Tablet 40 milliGRAM(s) Oral before breakfast  petrolatum white Ointment 1 Application(s) Topical three times a day  polyethylene glycol 3350 17 Gram(s) Oral daily  senna 2 Tablet(s) Oral at bedtime  sodium chloride 0.65% Nasal 1 Spray(s) Both Nostrils two times a day  tiotropium 18 MICROgram(s) Capsule 1 Capsule(s) Inhalation daily    MEDICATIONS  (PRN):  acetaminophen     Tablet .. 650 milliGRAM(s) Oral every 6 hours PRN Temp greater or equal to 38C (100.4F), Mild Pain (1 - 3)  aluminum hydroxide/magnesium hydroxide/simethicone Suspension 30 milliLiter(s) Oral every 4 hours PRN Dyspepsia  benzonatate 100 milliGRAM(s) Oral three times a day PRN Cough  melatonin 3 milliGRAM(s) Oral at bedtime PRN Insomnia  metoclopramide Injectable 10 milliGRAM(s) IV Push every 8 hours PRN nausea      LABS:                        10.7   9.30  )-----------( 239      ( 02 Dec 2021 07:31 )             34.6     12-02    141  |  102  |  12  ----------------------------<  136<H>  3.8   |  27  |  0.58    Ca    8.7      02 Dec 2021 07:31  Phos  2.7     12-02  Mg     2.2     12-02    TPro  6.2  /  Alb  3.2<L>  /  TBili  0.2  /  DBili  x   /  AST  17  /  ALT  17  /  AlkPhos  93  12-02              MICROBIOLOGY:     RADIOLOGY:  [ ] Reviewed and interpreted by me    Point of Care Ultrasound Findings:    PFT:    EKG: CHIEF COMPLAINT: f/up TBM, bronchiectasis, severe persistent asthma, CB, PNA-better over all- less cough    Interval Events: ambulating    REVIEW OF SYSTEMS:  Constitutional: No fevers or chills. No weight loss. + fatigue or generalized malaise.  Eyes: No itching or discharge from the eyes  ENT: No ear pain. No ear discharge. No nasal congestion. No post nasal drip. No epistaxis. No throat pain. No sore throat. No difficulty swallowing.   CV: No chest pain. No palpitations. No lightheadedness or dizziness.   Resp: No dyspnea at rest. + dyspnea on exertion. No orthopnea. + wheezing. + cough. No stridor. No sputum production. No chest pain with respiration.  GI: No nausea. No vomiting. No diarrhea.  MSK: No joint pain or pain in any extremities  Integumentary: No skin lesions. No pedal edema.  Neurological: No gross motor weakness. No sensory changes.  [+ ] All other systems negative  [ ] Unable to assess ROS because ________    OBJECTIVE:  ICU Vital Signs Last 24 Hrs  T(C): 36.8 (03 Dec 2021 04:20), Max: 36.8 (02 Dec 2021 13:54)  T(F): 98.3 (03 Dec 2021 04:20), Max: 98.3 (03 Dec 2021 04:20)  HR: 62 (03 Dec 2021 04:20) (62 - 94)  BP: 154/68 (03 Dec 2021 04:20) (134/76 - 154/68)  BP(mean): --  ABP: --  ABP(mean): --  RR: 18 (03 Dec 2021 04:20) (18 - 18)  SpO2: 94% (03 Dec 2021 04:20) (94% - 98%)        12-01 @ 07:01  -  12-02 @ 07:00  --------------------------------------------------------  IN: 580 mL / OUT: 800 mL / NET: -220 mL    12-02 @ 07:01  -  12-03 @ 05:55  --------------------------------------------------------  IN: 240 mL / OUT: 600 mL / NET: -360 mL      CAPILLARY BLOOD GLUCOSE      POCT Blood Glucose.: 138 mg/dL (03 Dec 2021 00:34)      PHYSICAL EXAM: NAD in bed on RA  General: Awake, alert, oriented X 3.   HEENT: Atraumatic, normocephalic.                 Mallampatti Grade 2                No nasal congestion.                No tonsillar or pharyngeal exudates.  Lymph Nodes: No palpable lymphadenopathy  Neck: No JVD. No carotid bruit.   Respiratory: Normal chest expansion                         Normal percussion                         Normal and equal air entry                         mild exp wheeze,no rhonchi or rales.  Cardiovascular: S1 S2 normal. No murmurs, rubs or gallops.   Abdomen: Soft, non-tender, non-distended. No organomegaly. Normoactive bowel sounds.  Extremities: Warm to touch. Peripheral pulse palpable. No pedal edema.   Skin: No rashes or skin lesions  Neurological: Motor and sensory examination equal and normal in all four extremities.  Psychiatry: Appropriate mood and affect.    HOSPITAL MEDICATIONS:  MEDICATIONS  (STANDING):  ALBUTerol    90 MICROgram(s) HFA Inhaler 2 Puff(s) Inhalation every 6 hours  apixaban 5 milliGRAM(s) Oral every 12 hours  budesonide 160 MICROgram(s)/formoterol 4.5 MICROgram(s) Inhaler 2 Puff(s) Inhalation two times a day  cefepime   IVPB 1000 milliGRAM(s) IV Intermittent every 8 hours  cefepime   IVPB      chlorhexidine 4% Liquid 1 Application(s) Topical <User Schedule>  dextrose 40% Gel 15 Gram(s) Oral once  dextrose 5%. 1000 milliLiter(s) (50 mL/Hr) IV Continuous <Continuous>  dextrose 5%. 1000 milliLiter(s) (100 mL/Hr) IV Continuous <Continuous>  dextrose 5%. 1000 milliLiter(s) (100 mL/Hr) IV Continuous <Continuous>  dextrose 50% Injectable 25 Gram(s) IV Push once  dextrose 50% Injectable 12.5 Gram(s) IV Push once  dextrose 50% Injectable 25 Gram(s) IV Push once  diltiazem    Tablet 60 milliGRAM(s) Oral every 12 hours  ferrous    sulfate 325 milliGRAM(s) Oral <User Schedule>  glucagon  Injectable 1 milliGRAM(s) IntraMuscular once  glucagon  Injectable 1 milliGRAM(s) IntraMuscular once  guaiFENesin ER 1200 milliGRAM(s) Oral every 12 hours  insulin glargine Injectable (LANTUS) 12 Unit(s) SubCutaneous at bedtime  insulin lispro (ADMELOG) corrective regimen sliding scale   SubCutaneous at bedtime  insulin lispro (ADMELOG) corrective regimen sliding scale   SubCutaneous three times a day before meals  insulin lispro Injectable (ADMELOG) 2 Unit(s) SubCutaneous three times a day before meals  methylPREDNISolone 4 milliGRAM(s) Oral daily  mexiletine 200 milliGRAM(s) Oral three times a day  montelukast 10 milliGRAM(s) Oral daily  mupirocin 2% Nasal 1 Application(s) Nasal two times a day  pantoprazole    Tablet 40 milliGRAM(s) Oral before breakfast  petrolatum white Ointment 1 Application(s) Topical three times a day  polyethylene glycol 3350 17 Gram(s) Oral daily  senna 2 Tablet(s) Oral at bedtime  sodium chloride 0.65% Nasal 1 Spray(s) Both Nostrils two times a day  tiotropium 18 MICROgram(s) Capsule 1 Capsule(s) Inhalation daily    MEDICATIONS  (PRN):  acetaminophen     Tablet .. 650 milliGRAM(s) Oral every 6 hours PRN Temp greater or equal to 38C (100.4F), Mild Pain (1 - 3)  aluminum hydroxide/magnesium hydroxide/simethicone Suspension 30 milliLiter(s) Oral every 4 hours PRN Dyspepsia  benzonatate 100 milliGRAM(s) Oral three times a day PRN Cough  melatonin 3 milliGRAM(s) Oral at bedtime PRN Insomnia  metoclopramide Injectable 10 milliGRAM(s) IV Push every 8 hours PRN nausea      LABS:                        10.7   9.30  )-----------( 239      ( 02 Dec 2021 07:31 )             34.6     12-02    141  |  102  |  12  ----------------------------<  136<H>  3.8   |  27  |  0.58    Ca    8.7      02 Dec 2021 07:31  Phos  2.7     12-02  Mg     2.2     12-02    TPro  6.2  /  Alb  3.2<L>  /  TBili  0.2  /  DBili  x   /  AST  17  /  ALT  17  /  AlkPhos  93  12-02              MICROBIOLOGY:     RADIOLOGY:  [ ] Reviewed and interpreted by me    Point of Care Ultrasound Findings:    PFT:    EKG:

## 2021-12-03 NOTE — PROGRESS NOTE ADULT - ATTENDING COMMENTS
-Seems to be overall improving on cefepime.   -F/u with pulm regarding ?consideration of short course of increased steroids?   -Discussed with Dr. Liz at bedside.   -Hyper-sal BID added.   -F/u repeat sputum cultures.   -Mag citrate for constipation.

## 2021-12-03 NOTE — PROGRESS NOTE ADULT - PROBLEM SELECTOR PLAN 6
#prophylactic measure  DVT ppx- c/w eliquis  Diet regular    daughter updated 11/30 #prophylactic measure  DVT ppx- c/w eliquis  Diet regular    daughter updated 12/3

## 2021-12-03 NOTE — DIETITIAN INITIAL EVALUATION ADULT. - OTHER INFO
Pt reports good appetite with poor PO intake of meals in setting of  complaints/dislike of institutionalized foods. Issues escalated to Nutrition & Dining/ team. Pt reports drinking 3 Glucerna shakes daily in-house. Pt reports no output from colostomy since Monday (11/29) - attributes to poor PO intake. Ordered for senna, Miralax. Will continue to monitor GI status as able. Pt denies difficulties chewing or swallowing. Confirmed food allergy to shellfish.    Pt reports UBW ~137 pounds. Most recent weight 139.7 pounds (12/1) suggests weight stability. Will continue to monitor and trend weights as able.    Encouraged PO intake as tolerated. Food preferences obtained, will honor as able to encourage intake.  issues escalated. Encouraged continued good consumption of Glucerna supplements, especially when PO intake of meals is poor. All nutrition-related questions answered as able. Pt made aware RD remains available.

## 2021-12-03 NOTE — DIETITIAN INITIAL EVALUATION ADULT. - PERTINENT LABORATORY DATA
glucose 216 (12/3)  HbA1c 7.8% (11/30)    CAPILLARY BLOOD GLUCOSE  POCT Blood Glucose.: 323 mg/dL (03 Dec 2021 12:10)  POCT Blood Glucose.: 174 mg/dL (03 Dec 2021 08:33)  POCT Blood Glucose.: 138 mg/dL (03 Dec 2021 00:34)  POCT Blood Glucose.: 98 mg/dL (02 Dec 2021 21:36)  POCT Blood Glucose.: 337 mg/dL (02 Dec 2021 18:08)  POCT Blood Glucose.: 231 mg/dL (02 Dec 2021 13:18)

## 2021-12-03 NOTE — DIETITIAN INITIAL EVALUATION ADULT. - ORAL INTAKE PTA/DIET HISTORY
Pt reports good appetite and PO intake PTA. Reports monitoring carbohydrate intake PTA. Per chart review, pt taking Victoza, Humalog, Lantus for DM management PTA. HbA1c 7.8% suggests suboptimal glycemic control. Also taking methylprednisolone PTA and in-house, likely affecting glycemic control.

## 2021-12-03 NOTE — DIETITIAN INITIAL EVALUATION ADULT. - REASON INDICATOR FOR ASSESSMENT
Consult received for assessment.   Source: EMR, pt.     Pt is a 72 yo female with PMH of tracheobronchomalasia s/p tracheobronchoplasty, bronchiectasis, severe allergic asthma, adrenal insufficiency, DM2, HTN, colorectal ca s/p total colectomy now with colostomy, PAF who presented with 1 day of chest discomfort associated with SOB. Admitted 11/28.

## 2021-12-03 NOTE — DIETITIAN INITIAL EVALUATION ADULT. - PROBLEM SELECTOR PLAN 1
#chest discomfort and SOB  Ddx:  PNA vs PE (hx of PE, but on eliquis),tracheobronchomalacia, asthma/COPD, HF  Leukocytosis of 12, borderline T 99.3. BNP mildly elevated at 322. trop 18 --> 20. EKG with ST elevations in septal leads??  No wheezing on exam, but some crackles at bases. Intermitt rumbling sound , ? transmitted sounds from her tracheomalacia  CXR "without acute findings" wet read showing clear lung, trachea may be more narrow appearing?. Do not see any consolidations on my read  Echo 11/2021 - no significant changes from previous echo. Mild diastolic dysfunction w/ normal LV . Hx of pseudomonas in Feb 2020.  Holding off additional imaging for now  Received suraj 1g in ED.  [ ] f/u bcx,   [ ]sputum   Abx: cefipime

## 2021-12-03 NOTE — PROGRESS NOTE ADULT - ASSESSMENT
71F with history of Tracheobronchomalasia s/p Tracheobronchoplasty, Bronchiectasis, Severe Allergic Asthma, Adrenal Insuffiencey on chronic steroids,DM2, HTN, Colorectal cancer s/p total colectomy now with colostomy, PAF on Eliquis. Follows w/ infectious disease Dr. Stout outpatient, has been treated multiple times in the past year for bronchiectasis exacerbations w/ sputum cx positive for multiple organisms including Pseudomonas. Now p/w SOB, chest tightness, and productive cough x4-5 days, found to have consolidation on CT scan, admitted w/ PNA and bronchiectasis exacerbation. ID consulted for antibiotic recommendations.     #PNEUMONIA /Bronchiectasis   - c/w cefepime (11/28- )   REPEAT SPUTUM CULTURE SENT  ALSO sent SPUTUM FOR AFB ( ATYPICAL MYCOBACTERIUM) AND FUNGUS  - will follow up sputum culture      #CHEST DISCOMFORT  appreciate cardiac evaluation  Atypical chest tightness - DDx. inclues underlying bronchiectasis vs pna vs restrictive pulmonary process in s/o kyphosis; states chest tightness worsens with deep breath.   Cardiac enzymes wnl,  ECG without acute ST changes, stress echo without signs of ischemic disease Jan 2021   - no evidence of active ischemia.   - Cardiology favors conservative management at this time.    Cardiac MRI (outpatient) Jan 2021 with combined diastolic and LV systolic dysfunction (LV EF 72%) - remains compensated    TTE Nov 2021 with mod-severe AR; no change since 2019   Paroxysmal a.fib - C/w Cardizem 60mg BID & Eliquis 5mg BID   - Rate controlled at this time- TSH wnl     Palpitations attributed to VPC on EP w/u at [facility]  - On mexiletine 200mg TID as per EP;        #HEMOPTYSIS  pulmonary is following  repeat sputum culture pending  check NTM and fungal culture  If worsens will need to consider further intervention and will need to address holding her anticoagulation  no further hemoptysis    #ASTHMA  pt continue to wheeze  pulmonary to address the steroids    #MRSA  pt is swab positive but WBC is improving and sputum has not shown this. May be chronically colonized but will consider treatment if change in status       Ashley Liz M.D. ,   Pager [phone]     after 5PM/ weekends [phone]    Assessment and plan discussed with Dr Bhatia    ID service will be covering over the weekend. Please call for acute issues or questions. [phone]        Assessment and plan discussed with the primary team .

## 2021-12-03 NOTE — PROGRESS NOTE ADULT - SUBJECTIVE AND OBJECTIVE BOX
Patient is a 71y old  Female who presents with a chief complaint of chest discomfort (03 Dec 2021 08:25)    Being followed by ID for        Interval history:  No other acute events      ROS:  No cough,SOB,CP  No N/V/D  No abd pain  No urinary complaints  No HA  No joint or limb pain  No other complaints    PAST MEDICAL & SURGICAL HISTORY:  Atrial fibrillation  paroxysmal, on eliquis    Diabetes  Type 2    COPD (chronic obstructive pulmonary disease)    Adrenal insufficiency  Medrol daily for over 50 years    Aortic insufficiency  moderate AR on echo 5/3/2018    Pelvic fracture    Asthma    Tracheobronchomalacia  diagnosed 2015, s/p bronchial thermoplasty 2016 (Dr Zapien); recent bronchoscopy 6/5/2018 revealed no evidence of tracheobronchomalacia in trachea or bronchial tubes    Colorectal cancer  4/2018- last treatment , chemo and radiation    Rectal bleeding    Seizure  x 1 1/7/18    DVT (deep venous thrombosis)  15-20 years ago, took coumadin    TIA (transient ischemic attack)  multiple, last 5 years ago - presents as right-sided weakness    History of partial hysterectomy  30 years ago - fibroids    H/O total knee replacement, bilateral  5 years ago    History of sinus surgery  multiple sinus surgeries    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    H/O pelvic surgery  5 years ago - s/p fracture    History of tracheomalacia  2015 - attempted tracheal stenting ([facility])- course complicated by obstruction, respiratory failure, multiple CPR attempts -  stent discontinued; 10/20/2016 Tracheobronchoplasty (Prolene Mesh) performed at [facility] by Dr Zapien    S/P bronchoscopy  6/5/2018 - [facility] (Dr Zapien) no evidence of tracheobronchomalacia in trachea or bronchial tubes    Rectal bleeding  exam under anesthesia ([facility]) 2/2018      Allergies    ampicillin (Short breath)  aspirin (Short breath)  Avelox (Short breath; Pruritus)  codeine (Short breath)  Dilaudid (Short breath)  iodine (Short breath; Swelling)  penicillin (Short breath)  shellfish (Anaphylaxis)  tetanus toxoid (Short breath)  Valium (Short breath)    Intolerances      Antimicrobials:    cefepime   IVPB 1000 milliGRAM(s) IV Intermittent every 8 hours  cefepime   IVPB        MEDICATIONS  (STANDING):  ALBUTerol    90 MICROgram(s) HFA Inhaler 2 Puff(s) Inhalation every 6 hours  apixaban 5 milliGRAM(s) Oral every 12 hours  budesonide 160 MICROgram(s)/formoterol 4.5 MICROgram(s) Inhaler 2 Puff(s) Inhalation two times a day  cefepime   IVPB 1000 milliGRAM(s) IV Intermittent every 8 hours  cefepime   IVPB      chlorhexidine 4% Liquid 1 Application(s) Topical <User Schedule>  dextrose 40% Gel 15 Gram(s) Oral once  dextrose 5%. 1000 milliLiter(s) (50 mL/Hr) IV Continuous <Continuous>  dextrose 5%. 1000 milliLiter(s) (100 mL/Hr) IV Continuous <Continuous>  dextrose 5%. 1000 milliLiter(s) (100 mL/Hr) IV Continuous <Continuous>  dextrose 50% Injectable 25 Gram(s) IV Push once  dextrose 50% Injectable 12.5 Gram(s) IV Push once  dextrose 50% Injectable 25 Gram(s) IV Push once  diltiazem    Tablet 60 milliGRAM(s) Oral every 12 hours  ferrous    sulfate 325 milliGRAM(s) Oral <User Schedule>  glucagon  Injectable 1 milliGRAM(s) IntraMuscular once  glucagon  Injectable 1 milliGRAM(s) IntraMuscular once  guaiFENesin ER 1200 milliGRAM(s) Oral every 12 hours  insulin glargine Injectable (LANTUS) 12 Unit(s) SubCutaneous at bedtime  insulin lispro (ADMELOG) corrective regimen sliding scale   SubCutaneous three times a day before meals  insulin lispro (ADMELOG) corrective regimen sliding scale   SubCutaneous at bedtime  insulin lispro Injectable (ADMELOG) 2 Unit(s) SubCutaneous three times a day before meals  methylPREDNISolone 4 milliGRAM(s) Oral daily  mexiletine 200 milliGRAM(s) Oral three times a day  montelukast 10 milliGRAM(s) Oral daily  mupirocin 2% Nasal 1 Application(s) Nasal two times a day  pantoprazole    Tablet 40 milliGRAM(s) Oral before breakfast  petrolatum white Ointment 1 Application(s) Topical three times a day  polyethylene glycol 3350 17 Gram(s) Oral daily  senna 2 Tablet(s) Oral at bedtime  sodium chloride 0.65% Nasal 1 Spray(s) Both Nostrils two times a day  tiotropium 18 MICROgram(s) Capsule 1 Capsule(s) Inhalation daily      Vital Signs Last 24 Hrs  T(C): 36.8 (12-03-21 @ 04:20), Max: 36.8 (12-02-21 @ 13:54)  T(F): 98.3 (12-03-21 @ 04:20), Max: 98.3 (12-03-21 @ 04:20)  HR: 96 (12-03-21 @ 11:56) (62 - 96)  BP: 154/68 (12-03-21 @ 04:20) (134/76 - 154/68)  BP(mean): --  RR: 18 (12-03-21 @ 04:20) (18 - 18)  SpO2: 97% (12-03-21 @ 11:56) (94% - 99%)    Physical Exam:    Constitutional well preserved,comfortable,pleasant    HEENT PERRLA EOMI,No pallor or icterus    No oral exudate or erythema    Neck supple no JVD or LN    Chest Good AE,CTA    CVS RRR S1 S2 WNl No murmur or rub or gallop    Abd soft BS normal No tenderness no masses    Ext No cyanosis clubbing or edema    IV site no erythema tenderness or discharge    Joints no swelling or LOM    CNS AAO X 3 no focal    Lab Data:                          10.4   8.13  )-----------( 245      ( 03 Dec 2021 07:25 )             34.3       12-03    139  |  102  |  14  ----------------------------<  216<H>  4.2   |  26  |  0.68    Ca    8.6      03 Dec 2021 07:25  Phos  3.1     12-03  Mg     2.1     12-03    TPro  5.9<L>  /  Alb  3.2<L>  /  TBili  0.1<L>  /  DBili  x   /  AST  15  /  ALT  15  /  AlkPhos  85  12-03          .Sputum Sputum  12-02-21 --  --    Rare polymorphonuclear leukocytes per low power field  Few Squamous epithelial cells per low power field  Few Yeast like cells per oil power field  Few Gram Positive Rods per oil power field      .Sputum Sputum  11-29-21   Normal Respiratory Jessica present  --    Moderate polymorphonuclear leukocytes per low power field  No Squamous epithelial cells per low power field  Rare Gram positive cocci in pairs per oil power field      .Blood Blood-Peripheral  11-28-21   No growth to date.  --  --                    WBC Count: 8.13 (12-03-21 @ 07:25)  WBC Count: 9.30 (12-02-21 @ 07:31)  WBC Count: 11.79 (12-01-21 @ 07:10)  WBC Count: 16.86 (11-30-21 @ 07:06)  WBC Count: 18.05 (11-29-21 @ 06:04)  WBC Count: 12.24 (11-28-21 @ 16:31)             Patient is a 71y old  Female who presents with a chief complaint of chest discomfort (03 Dec 2021 08:25)    Being followed by ID for cough        Interval history:  no further hemoptysis   still with wheezing   No other acute events        PAST MEDICAL & SURGICAL HISTORY:  Atrial fibrillation  paroxysmal, on eliquis    Diabetes  Type 2    COPD (chronic obstructive pulmonary disease)    Adrenal insufficiency  Medrol daily for over 50 years    Aortic insufficiency  moderate AR on echo 5/3/2018    Pelvic fracture    Asthma    Tracheobronchomalacia  diagnosed 2015, s/p bronchial thermoplasty 2016 (Dr Zapien); recent bronchoscopy 6/5/2018 revealed no evidence of tracheobronchomalacia in trachea or bronchial tubes    Colorectal cancer  4/2018- last treatment , chemo and radiation    Rectal bleeding    Seizure  x 1 1/7/18    DVT (deep venous thrombosis)  15-20 years ago, took coumadin    TIA (transient ischemic attack)  multiple, last 5 years ago - presents as right-sided weakness    History of partial hysterectomy  30 years ago - fibroids    H/O total knee replacement, bilateral  5 years ago    History of sinus surgery  multiple sinus surgeries    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    H/O pelvic surgery  5 years ago - s/p fracture    History of tracheomalacia  2015 - attempted tracheal stenting ([facility])- course complicated by obstruction, respiratory failure, multiple CPR attempts -  stent discontinued; 10/20/2016 Tracheobronchoplasty (Prolene Mesh) performed at [facility] by Dr Zapien    S/P bronchoscopy  6/5/2018 - [place] [facility] (Dr Zapien) no evidence of tracheobronchomalacia in trachea or bronchial tubes    Rectal bleeding  exam under anesthesia ([facility]) 2/2018      Allergies    ampicillin (Short breath)  aspirin (Short breath)  Avelox (Short breath; Pruritus)  codeine (Short breath)  Dilaudid (Short breath)  iodine (Short breath; Swelling)  penicillin (Short breath)  shellfish (Anaphylaxis)  tetanus toxoid (Short breath)  Valium (Short breath)    Intolerances      Antimicrobials:    cefepime   IVPB 1000 milliGRAM(s) IV Intermittent every 8 hours  cefepime   IVPB        MEDICATIONS  (STANDING):  ALBUTerol    90 MICROgram(s) HFA Inhaler 2 Puff(s) Inhalation every 6 hours  apixaban 5 milliGRAM(s) Oral every 12 hours  budesonide 160 MICROgram(s)/formoterol 4.5 MICROgram(s) Inhaler 2 Puff(s) Inhalation two times a day  cefepime   IVPB 1000 milliGRAM(s) IV Intermittent every 8 hours  cefepime   IVPB      chlorhexidine 4% Liquid 1 Application(s) Topical <User Schedule>  dextrose 40% Gel 15 Gram(s) Oral once  dextrose 5%. 1000 milliLiter(s) (50 mL/Hr) IV Continuous <Continuous>  dextrose 5%. 1000 milliLiter(s) (100 mL/Hr) IV Continuous <Continuous>  dextrose 5%. 1000 milliLiter(s) (100 mL/Hr) IV Continuous <Continuous>  dextrose 50% Injectable 25 Gram(s) IV Push once  dextrose 50% Injectable 12.5 Gram(s) IV Push once  dextrose 50% Injectable 25 Gram(s) IV Push once  diltiazem    Tablet 60 milliGRAM(s) Oral every 12 hours  ferrous    sulfate 325 milliGRAM(s) Oral <User Schedule>  glucagon  Injectable 1 milliGRAM(s) IntraMuscular once  glucagon  Injectable 1 milliGRAM(s) IntraMuscular once  guaiFENesin ER 1200 milliGRAM(s) Oral every 12 hours  insulin glargine Injectable (LANTUS) 12 Unit(s) SubCutaneous at bedtime  insulin lispro (ADMELOG) corrective regimen sliding scale   SubCutaneous three times a day before meals  insulin lispro (ADMELOG) corrective regimen sliding scale   SubCutaneous at bedtime  insulin lispro Injectable (ADMELOG) 2 Unit(s) SubCutaneous three times a day before meals  methylPREDNISolone 4 milliGRAM(s) Oral daily  mexiletine 200 milliGRAM(s) Oral three times a day  montelukast 10 milliGRAM(s) Oral daily  mupirocin 2% Nasal 1 Application(s) Nasal two times a day  pantoprazole    Tablet 40 milliGRAM(s) Oral before breakfast  petrolatum white Ointment 1 Application(s) Topical three times a day  polyethylene glycol 3350 17 Gram(s) Oral daily  senna 2 Tablet(s) Oral at bedtime  sodium chloride 0.65% Nasal 1 Spray(s) Both Nostrils two times a day  tiotropium 18 MICROgram(s) Capsule 1 Capsule(s) Inhalation daily      Vital Signs Last 24 Hrs  T(C): 36.8 (12-03-21 @ 04:20), Max: 36.8 (12-02-21 @ 13:54)  T(F): 98.3 (12-03-21 @ 04:20), Max: 98.3 (12-03-21 @ 04:20)  HR: 96 (12-03-21 @ 11:56) (62 - 96)  BP: 154/68 (12-03-21 @ 04:20) (134/76 - 154/68)  BP(mean): --  RR: 18 (12-03-21 @ 04:20) (18 - 18)  SpO2: 97% (12-03-21 @ 11:56) (94% - 99%)    Physical Exam:    Constitutional well preserved,comfortable,pleasant    HEENT PERRLA EOMI,No pallor or icterus    No oral exudate or erythema    Neck supple no JVD or LN    Chest bilateral wheezes R> L  CVS RRR S1 S2     Abd soft BS normal No tenderness no masses    Ext No cyanosis clubbing or edema    IV site no erythema tenderness or discharge    Joints no swelling or LOM    CNS AAO X 3 no focal    Lab Data:                          10.4   8.13  )-----------( 245      ( 03 Dec 2021 07:25 )             34.3       12-03    139  |  102  |  14  ----------------------------<  216<H>  4.2   |  26  |  0.68    Ca    8.6      03 Dec 2021 07:25  Phos  3.1     12-03  Mg     2.1     12-03    TPro  5.9<L>  /  Alb  3.2<L>  /  TBili  0.1<L>  /  DBili  x   /  AST  15  /  ALT  15  /  AlkPhos  85  12-03          .Sputum Sputum  12-02-21 --  --    Rare polymorphonuclear leukocytes per low power field  Few Squamous epithelial cells per low power field  Few Yeast like cells per oil power field  Few Gram Positive Rods per oil power field      .Sputum Sputum  11-29-21   Normal Respiratory Jessica present  --    Moderate polymorphonuclear leukocytes per low power field  No Squamous epithelial cells per low power field  Rare Gram positive cocci in pairs per oil power field      .Blood Blood-Peripheral  11-28-21   No growth to date.  --  --        MRSA/MSSA PCR (11.30.21 @ 18:17)    MRSA PCR Result.: Detected: MRSA/MSSA PCR assay is a qualitative in vitro diagnostic test for the  direct detection and differentiation of methicillin-resistant  Staphylococcus aureus (MRSA) from Staphylococcus aureus (SA). The assay  detects DNA from nasal swabs in patients atrisk for nasal colonization.  It is not intended to diagnose MRSA or SA infections nor guide or monitor  treatment for MRSA/SA infections. A negative result does not preclude  nasal colonization. The Real-Time PCR assay is FDA-approved, and its  performance has been established by [facility], [place], [place].    Staph Aureus PCR Result: Detected        WBC Count: 8.13 (12-03-21 @ 07:25)  WBC Count: 9.30 (12-02-21 @ 07:31)  WBC Count: 11.79 (12-01-21 @ 07:10)  WBC Count: 16.86 (11-30-21 @ 07:06)  WBC Count: 18.05 (11-29-21 @ 06:04)  WBC Count: 12.24 (11-28-21 @ 16:31)

## 2021-12-03 NOTE — DIETITIAN INITIAL EVALUATION ADULT. - FACTORS AFF FOOD INTAKE
dislike of institutionalized foods/[employer]/ethnic/cultural/personal food preferences/other (specify)

## 2021-12-04 LAB
ALBUMIN SERPL ELPH-MCNC: 3.2 G/DL — LOW (ref 3.3–5)
ALP SERPL-CCNC: 82 U/L — SIGNIFICANT CHANGE UP (ref 40–120)
ALT FLD-CCNC: 15 U/L — SIGNIFICANT CHANGE UP (ref 10–45)
ANION GAP SERPL CALC-SCNC: 10 MMOL/L — SIGNIFICANT CHANGE UP (ref 5–17)
AST SERPL-CCNC: 13 U/L — SIGNIFICANT CHANGE UP (ref 10–40)
BILIRUB SERPL-MCNC: <0.1 MG/DL — LOW (ref 0.2–1.2)
BUN SERPL-MCNC: 11 MG/DL — SIGNIFICANT CHANGE UP (ref 7–23)
CALCIUM SERPL-MCNC: 9 MG/DL — SIGNIFICANT CHANGE UP (ref 8.4–10.5)
CHLORIDE SERPL-SCNC: 102 MMOL/L — SIGNIFICANT CHANGE UP (ref 96–108)
CO2 SERPL-SCNC: 30 MMOL/L — SIGNIFICANT CHANGE UP (ref 22–31)
CREAT SERPL-MCNC: 0.59 MG/DL — SIGNIFICANT CHANGE UP (ref 0.5–1.3)
CULTURE RESULTS: SIGNIFICANT CHANGE UP
GLUCOSE SERPL-MCNC: 184 MG/DL — HIGH (ref 70–99)
HCT VFR BLD CALC: 35.7 % — SIGNIFICANT CHANGE UP (ref 34.5–45)
HGB BLD-MCNC: 10.7 G/DL — LOW (ref 11.5–15.5)
MAGNESIUM SERPL-MCNC: 2.7 MG/DL — HIGH (ref 1.6–2.6)
MCHC RBC-ENTMCNC: 23.5 PG — LOW (ref 27–34)
MCHC RBC-ENTMCNC: 30 GM/DL — LOW (ref 32–36)
MCV RBC AUTO: 78.5 FL — LOW (ref 80–100)
NRBC # BLD: 0 /100 WBCS — SIGNIFICANT CHANGE UP (ref 0–0)
PHOSPHATE SERPL-MCNC: 2.7 MG/DL — SIGNIFICANT CHANGE UP (ref 2.5–4.5)
PLATELET # BLD AUTO: 286 K/UL — SIGNIFICANT CHANGE UP (ref 150–400)
POTASSIUM SERPL-MCNC: 4 MMOL/L — SIGNIFICANT CHANGE UP (ref 3.5–5.3)
POTASSIUM SERPL-SCNC: 4 MMOL/L — SIGNIFICANT CHANGE UP (ref 3.5–5.3)
PROT SERPL-MCNC: 6.1 G/DL — SIGNIFICANT CHANGE UP (ref 6–8.3)
RBC # BLD: 4.55 M/UL — SIGNIFICANT CHANGE UP (ref 3.8–5.2)
RBC # FLD: 15.8 % — HIGH (ref 10.3–14.5)
SODIUM SERPL-SCNC: 142 MMOL/L — SIGNIFICANT CHANGE UP (ref 135–145)
SPECIMEN SOURCE: SIGNIFICANT CHANGE UP
WBC # BLD: 7.18 K/UL — SIGNIFICANT CHANGE UP (ref 3.8–10.5)
WBC # FLD AUTO: 7.18 K/UL — SIGNIFICANT CHANGE UP (ref 3.8–10.5)

## 2021-12-04 PROCEDURE — 99232 SBSQ HOSP IP/OBS MODERATE 35: CPT | Mod: GC

## 2021-12-04 RX ADMIN — ALBUTEROL 2 PUFF(S): 90 AEROSOL, METERED ORAL at 17:22

## 2021-12-04 RX ADMIN — Medication 1 SPRAY(S): at 17:56

## 2021-12-04 RX ADMIN — Medication 60 MILLIGRAM(S): at 17:55

## 2021-12-04 RX ADMIN — BUDESONIDE AND FORMOTEROL FUMARATE DIHYDRATE 2 PUFF(S): 160; 4.5 AEROSOL RESPIRATORY (INHALATION) at 17:22

## 2021-12-04 RX ADMIN — Medication 100 MILLIGRAM(S): at 12:57

## 2021-12-04 RX ADMIN — APIXABAN 5 MILLIGRAM(S): 2.5 TABLET, FILM COATED ORAL at 06:17

## 2021-12-04 RX ADMIN — MEXILETINE HYDROCHLORIDE 200 MILLIGRAM(S): 150 CAPSULE ORAL at 22:13

## 2021-12-04 RX ADMIN — Medication 1 SPRAY(S): at 06:19

## 2021-12-04 RX ADMIN — CHLORHEXIDINE GLUCONATE 1 APPLICATION(S): 213 SOLUTION TOPICAL at 06:17

## 2021-12-04 RX ADMIN — Medication 100 MILLIGRAM(S): at 22:14

## 2021-12-04 RX ADMIN — MUPIROCIN 1 APPLICATION(S): 20 OINTMENT TOPICAL at 17:56

## 2021-12-04 RX ADMIN — MONTELUKAST 10 MILLIGRAM(S): 4 TABLET, CHEWABLE ORAL at 12:58

## 2021-12-04 RX ADMIN — SENNA PLUS 2 TABLET(S): 8.6 TABLET ORAL at 22:13

## 2021-12-04 RX ADMIN — MEXILETINE HYDROCHLORIDE 200 MILLIGRAM(S): 150 CAPSULE ORAL at 16:24

## 2021-12-04 RX ADMIN — Medication 2: at 09:03

## 2021-12-04 RX ADMIN — Medication 1200 MILLIGRAM(S): at 06:18

## 2021-12-04 RX ADMIN — TIOTROPIUM BROMIDE 1 CAPSULE(S): 18 CAPSULE ORAL; RESPIRATORY (INHALATION) at 11:23

## 2021-12-04 RX ADMIN — MEXILETINE HYDROCHLORIDE 200 MILLIGRAM(S): 150 CAPSULE ORAL at 06:18

## 2021-12-04 RX ADMIN — CEFEPIME 100 MILLIGRAM(S): 1 INJECTION, POWDER, FOR SOLUTION INTRAMUSCULAR; INTRAVENOUS at 06:08

## 2021-12-04 RX ADMIN — Medication 2 UNIT(S): at 17:52

## 2021-12-04 RX ADMIN — Medication 60 MILLIGRAM(S): at 06:17

## 2021-12-04 RX ADMIN — Medication 1200 MILLIGRAM(S): at 18:13

## 2021-12-04 RX ADMIN — SODIUM CHLORIDE 4 MILLILITER(S): 9 INJECTION INTRAMUSCULAR; INTRAVENOUS; SUBCUTANEOUS at 17:23

## 2021-12-04 RX ADMIN — ALBUTEROL 2 PUFF(S): 90 AEROSOL, METERED ORAL at 06:18

## 2021-12-04 RX ADMIN — ALBUTEROL 2 PUFF(S): 90 AEROSOL, METERED ORAL at 23:38

## 2021-12-04 RX ADMIN — Medication 4 MILLIGRAM(S): at 06:19

## 2021-12-04 RX ADMIN — BUDESONIDE AND FORMOTEROL FUMARATE DIHYDRATE 2 PUFF(S): 160; 4.5 AEROSOL RESPIRATORY (INHALATION) at 06:18

## 2021-12-04 RX ADMIN — Medication 2 UNIT(S): at 09:04

## 2021-12-04 RX ADMIN — MUPIROCIN 1 APPLICATION(S): 20 OINTMENT TOPICAL at 06:18

## 2021-12-04 RX ADMIN — PANTOPRAZOLE SODIUM 40 MILLIGRAM(S): 20 TABLET, DELAYED RELEASE ORAL at 06:19

## 2021-12-04 RX ADMIN — CEFEPIME 100 MILLIGRAM(S): 1 INJECTION, POWDER, FOR SOLUTION INTRAMUSCULAR; INTRAVENOUS at 22:13

## 2021-12-04 RX ADMIN — CEFEPIME 100 MILLIGRAM(S): 1 INJECTION, POWDER, FOR SOLUTION INTRAMUSCULAR; INTRAVENOUS at 16:23

## 2021-12-04 RX ADMIN — Medication 1 APPLICATION(S): at 06:19

## 2021-12-04 RX ADMIN — Medication 1 APPLICATION(S): at 14:23

## 2021-12-04 RX ADMIN — Medication 2: at 17:52

## 2021-12-04 RX ADMIN — Medication 2 UNIT(S): at 13:22

## 2021-12-04 RX ADMIN — Medication 1 APPLICATION(S): at 22:13

## 2021-12-04 RX ADMIN — POLYETHYLENE GLYCOL 3350 17 GRAM(S): 17 POWDER, FOR SOLUTION ORAL at 12:58

## 2021-12-04 RX ADMIN — Medication 8: at 13:21

## 2021-12-04 RX ADMIN — INSULIN GLARGINE 12 UNIT(S): 100 INJECTION, SOLUTION SUBCUTANEOUS at 22:12

## 2021-12-04 RX ADMIN — APIXABAN 5 MILLIGRAM(S): 2.5 TABLET, FILM COATED ORAL at 17:55

## 2021-12-04 RX ADMIN — ALBUTEROL 2 PUFF(S): 90 AEROSOL, METERED ORAL at 11:05

## 2021-12-04 NOTE — PROGRESS NOTE ADULT - ATTENDING COMMENTS
I agree with the above findings, assessment, and plan with the following additions and exceptions:     #presumed gram negative pneumonia -- hx of psa in the past  -C/w cefepime  -Monitor cx data  -Chest vest percussion should be done aggressively.   -Amb2 o2 pending.  -Pulm and ID on the case.    #Afib  -C/w mexiletine and diltiazem   -C/w Eliquis; monitor for bleeding; none.    #COPD  -Compensated; c/w current rx.    #AI  -C/w home steroids    All consultant contribution to care greatly appreciated.     Rest of plan as above    Dr. Scott Funez, DO  Hospitalist  Division of Hospital Medicine  [facility]  Available via [employer] Teams I agree with the above findings, assessment, and plan with the following additions and exceptions:     #presumed gram negative pneumonia -- hx of psa in the past  -C/w cefepime  -Monitor cx data  -Chest vest percussion should be done aggressively.   -Amb2 o2 pending.  -Pulm and ID on the case.    #Afib  -C/w mexiletine and diltiazem   -C/w Eliquis; monitor for bleeding; none.    #AI  -C/w home steroids    All consultant contribution to care greatly appreciated.     Rest of plan as above    Dr. Scott Funez,   Hospitalist  Division of Hospital Medicine  [facility]  Available via Microsoft Teams

## 2021-12-04 NOTE — PROGRESS NOTE ADULT - PROBLEM SELECTOR PLAN 6
#prophylactic measure  DVT ppx- c/w eliquis  Diet regular    daughter updated 12/3 #prophylactic measure  DVT ppx- c/w eliquis  Diet regular

## 2021-12-04 NOTE — PROGRESS NOTE ADULT - SUBJECTIVE AND OBJECTIVE BOX
Bozena UlyssesLorenzoRadha | PGY-1  Internal Medicine  Pager: [phone] NS/ [phone] [facility]    OVERNIGHT EVENTS: no overnight events      SUBJECTIVE: Patient was examined at bedside this morning. Appeared comfortable. Overnight vitals and monitoring results were reviewed. Reporting no complaints. Denied chest pain, SOB, abdominal pain, vomiting, diarrhea, constipation.       MEDICATIONS  (STANDING):  ALBUTerol    90 MICROgram(s) HFA Inhaler 2 Puff(s) Inhalation every 6 hours  apixaban 5 milliGRAM(s) Oral every 12 hours  budesonide 160 MICROgram(s)/formoterol 4.5 MICROgram(s) Inhaler 2 Puff(s) Inhalation two times a day  cefepime   IVPB 1000 milliGRAM(s) IV Intermittent every 8 hours  cefepime   IVPB      chlorhexidine 4% Liquid 1 Application(s) Topical <User Schedule>  dextrose 40% Gel 15 Gram(s) Oral once  dextrose 5%. 1000 milliLiter(s) (50 mL/Hr) IV Continuous <Continuous>  dextrose 5%. 1000 milliLiter(s) (100 mL/Hr) IV Continuous <Continuous>  dextrose 5%. 1000 milliLiter(s) (100 mL/Hr) IV Continuous <Continuous>  dextrose 50% Injectable 25 Gram(s) IV Push once  dextrose 50% Injectable 12.5 Gram(s) IV Push once  dextrose 50% Injectable 25 Gram(s) IV Push once  diltiazem    Tablet 60 milliGRAM(s) Oral every 12 hours  ferrous    sulfate 325 milliGRAM(s) Oral <User Schedule>  glucagon  Injectable 1 milliGRAM(s) IntraMuscular once  glucagon  Injectable 1 milliGRAM(s) IntraMuscular once  guaiFENesin ER 1200 milliGRAM(s) Oral every 12 hours  insulin glargine Injectable (LANTUS) 12 Unit(s) SubCutaneous at bedtime  insulin lispro (ADMELOG) corrective regimen sliding scale   SubCutaneous three times a day before meals  insulin lispro (ADMELOG) corrective regimen sliding scale   SubCutaneous at bedtime  insulin lispro Injectable (ADMELOG) 2 Unit(s) SubCutaneous three times a day before meals  methylPREDNISolone 4 milliGRAM(s) Oral daily  mexiletine 200 milliGRAM(s) Oral three times a day  montelukast 10 milliGRAM(s) Oral daily  mupirocin 2% Nasal 1 Application(s) Nasal two times a day  pantoprazole    Tablet 40 milliGRAM(s) Oral before breakfast  petrolatum white Ointment 1 Application(s) Topical three times a day  polyethylene glycol 3350 17 Gram(s) Oral daily  senna 2 Tablet(s) Oral at bedtime  sodium chloride 0.65% Nasal 1 Spray(s) Both Nostrils two times a day  sodium chloride 7% Inhalation 4 milliLiter(s) Inhalation two times a day  tiotropium 18 MICROgram(s) Capsule 1 Capsule(s) Inhalation daily    MEDICATIONS  (PRN):  acetaminophen     Tablet .. 650 milliGRAM(s) Oral every 6 hours PRN Temp greater or equal to 38C (100.4F), Mild Pain (1 - 3)  aluminum hydroxide/magnesium hydroxide/simethicone Suspension 30 milliLiter(s) Oral every 4 hours PRN Dyspepsia  benzonatate 100 milliGRAM(s) Oral three times a day PRN Cough  bisacodyl 5 milliGRAM(s) Oral every 12 hours PRN Constipation  melatonin 3 milliGRAM(s) Oral at bedtime PRN Insomnia  metoclopramide Injectable 10 milliGRAM(s) IV Push every 8 hours PRN nausea        T(F): 98.5 (12-04-21 @ 05:26), Max: 98.5 (12-04-21 @ 05:26)  HR: 65 (12-04-21 @ 06:24) (62 - 96)  BP: 144/65 (12-04-21 @ 05:26) (132/68 - 145/77)  BP(mean): --  RR: 18 (12-04-21 @ 05:26) (18 - 18)  SpO2: 97% (12-04-21 @ 06:24) (94% - 98%)    PHYSICAL EXAM:     GENERAL: NAD, lying in bed comfortably  HEAD:  Atraumatic, Normocephalic  EYES:  R eye injected, L eye false eye  CHEST/LUNG: on RA. Crackles b/l lower lobes improved from previous exam. Cough on deep inspiration.    HEART: Regular rate and rhythm; No murmurs, rubs, or gallops, normal S1/S2  ABDOMEN: normal bowel sounds; Soft, nontender, nondistended, no organomegaly.  + colostomy   EXTREMITIES:  2+ Peripheral Pulses, brisk capillary refill. No clubbing, cyanosis, or edema  MSK: No gross deformities noted     TELEMETRY:    LABS:                        10.4   8.13  )-----------( 245      ( 03 Dec 2021 07:25 )             34.3     12-03    139  |  102  |  14  ----------------------------<  216<H>  4.2   |  26  |  0.68    Ca    8.6      03 Dec 2021 07:25  Phos  3.1     12-03  Mg     2.1     12-03    TPro  5.9<L>  /  Alb  3.2<L>  /  TBili  0.1<L>  /  DBili  x   /  AST  15  /  ALT  15  /  AlkPhos  85  12-03    CARDIAC MARKERS ( 02 Dec 2021 07:31 )  x     / x     / 125 U/L / x     / 1.4 ng/mL          Creatinine Trend: 0.68<--, 0.58<--, 0.65<--, 0.70<--, 0.81<--, 0.73<--  I&O's Summary    03 Dec 2021 07:01  -  04 Dec 2021 07:00  --------------------------------------------------------  IN: 240 mL / OUT: 1400 mL / NET: -1160 mL      BNPSerum Pro-Brain Natriuretic Peptide: 103 pg/mL (12-03 @ 07:25)      RADIOLOGY & ADDITIONAL STUDIES:

## 2021-12-04 NOTE — PROGRESS NOTE ADULT - PROBLEM SELECTOR PLAN 1
#chest discomfort and SOB  Echo 11/2021 - no significant changes from previous echo. Mild diastolic dysfunction w/ normal LV . Hx of pseudomonas in Feb 2020.  Received suraj 1g in ED.  - f/u blood and sputum cx, urine legionella   - CT chest showed lingula consolidation, LLL opacity PNA vs atelectasis, impacted distal bronchioles, 9mm RLL nodule needs f/u in 3 months   - c/w cefipime (11/29- )  - ID consult  - wean O2  - Cards consult for continued chest discomfort rec'd conservative management   - repeat sputum culture; fungal and AFB sputum culture #chest discomfort and SOB  Echo 11/2021 - no significant changes from previous echo. Mild diastolic dysfunction w/ normal LV . Hx of pseudomonas in Feb 2020.  Received suraj 1g in ED.  - f/u blood and sputum cx, urine legionella   - CT chest showed lingula consolidation, LLL opacity PNA vs atelectasis, impacted distal bronchioles, 9mm RLL nodule needs f/u in 3 months   - c/w cefipime (11/29- )  - ID consult  - Cards consult for continued chest discomfort rec'd conservative management   - repeat sputum culture; fungal and AFB sputum culture  - now breathing comfortably on RA

## 2021-12-05 LAB
ALBUMIN SERPL ELPH-MCNC: 3.3 G/DL — SIGNIFICANT CHANGE UP (ref 3.3–5)
ALP SERPL-CCNC: 86 U/L — SIGNIFICANT CHANGE UP (ref 40–120)
ALT FLD-CCNC: 13 U/L — SIGNIFICANT CHANGE UP (ref 10–45)
ANION GAP SERPL CALC-SCNC: 11 MMOL/L — SIGNIFICANT CHANGE UP (ref 5–17)
AST SERPL-CCNC: 13 U/L — SIGNIFICANT CHANGE UP (ref 10–40)
BILIRUB SERPL-MCNC: 0.1 MG/DL — LOW (ref 0.2–1.2)
BUN SERPL-MCNC: 16 MG/DL — SIGNIFICANT CHANGE UP (ref 7–23)
CALCIUM SERPL-MCNC: 8.9 MG/DL — SIGNIFICANT CHANGE UP (ref 8.4–10.5)
CHLORIDE SERPL-SCNC: 100 MMOL/L — SIGNIFICANT CHANGE UP (ref 96–108)
CO2 SERPL-SCNC: 28 MMOL/L — SIGNIFICANT CHANGE UP (ref 22–31)
CREAT SERPL-MCNC: 0.67 MG/DL — SIGNIFICANT CHANGE UP (ref 0.5–1.3)
GLUCOSE SERPL-MCNC: 170 MG/DL — HIGH (ref 70–99)
HCT VFR BLD CALC: 37.2 % — SIGNIFICANT CHANGE UP (ref 34.5–45)
HGB BLD-MCNC: 11.3 G/DL — LOW (ref 11.5–15.5)
MAGNESIUM SERPL-MCNC: 2.1 MG/DL — SIGNIFICANT CHANGE UP (ref 1.6–2.6)
MCHC RBC-ENTMCNC: 23.8 PG — LOW (ref 27–34)
MCHC RBC-ENTMCNC: 30.4 GM/DL — LOW (ref 32–36)
MCV RBC AUTO: 78.3 FL — LOW (ref 80–100)
NRBC # BLD: 0 /100 WBCS — SIGNIFICANT CHANGE UP (ref 0–0)
PHOSPHATE SERPL-MCNC: 2.9 MG/DL — SIGNIFICANT CHANGE UP (ref 2.5–4.5)
PLATELET # BLD AUTO: 317 K/UL — SIGNIFICANT CHANGE UP (ref 150–400)
POTASSIUM SERPL-MCNC: 4 MMOL/L — SIGNIFICANT CHANGE UP (ref 3.5–5.3)
POTASSIUM SERPL-SCNC: 4 MMOL/L — SIGNIFICANT CHANGE UP (ref 3.5–5.3)
PROT SERPL-MCNC: 6.3 G/DL — SIGNIFICANT CHANGE UP (ref 6–8.3)
RBC # BLD: 4.75 M/UL — SIGNIFICANT CHANGE UP (ref 3.8–5.2)
RBC # FLD: 15.6 % — HIGH (ref 10.3–14.5)
SARS-COV-2 RNA SPEC QL NAA+PROBE: SIGNIFICANT CHANGE UP
SODIUM SERPL-SCNC: 139 MMOL/L — SIGNIFICANT CHANGE UP (ref 135–145)
WBC # BLD: 9.02 K/UL — SIGNIFICANT CHANGE UP (ref 3.8–10.5)
WBC # FLD AUTO: 9.02 K/UL — SIGNIFICANT CHANGE UP (ref 3.8–10.5)

## 2021-12-05 PROCEDURE — 99232 SBSQ HOSP IP/OBS MODERATE 35: CPT | Mod: GC

## 2021-12-05 PROCEDURE — 99233 SBSQ HOSP IP/OBS HIGH 50: CPT | Mod: GC

## 2021-12-05 PROCEDURE — 93010 ELECTROCARDIOGRAM REPORT: CPT

## 2021-12-05 RX ORDER — MULTIVIT WITH MIN/MFOLATE/K2 340-15/3 G
1 POWDER (GRAM) ORAL ONCE
Refills: 0 | Status: COMPLETED | OUTPATIENT
Start: 2021-12-05 | End: 2021-12-05

## 2021-12-05 RX ADMIN — Medication 1 SPRAY(S): at 06:00

## 2021-12-05 RX ADMIN — Medication 4 MILLIGRAM(S): at 05:58

## 2021-12-05 RX ADMIN — Medication 1200 MILLIGRAM(S): at 18:09

## 2021-12-05 RX ADMIN — SODIUM CHLORIDE 4 MILLILITER(S): 9 INJECTION INTRAMUSCULAR; INTRAVENOUS; SUBCUTANEOUS at 17:06

## 2021-12-05 RX ADMIN — Medication 2: at 18:11

## 2021-12-05 RX ADMIN — TIOTROPIUM BROMIDE 1 CAPSULE(S): 18 CAPSULE ORAL; RESPIRATORY (INHALATION) at 11:06

## 2021-12-05 RX ADMIN — APIXABAN 5 MILLIGRAM(S): 2.5 TABLET, FILM COATED ORAL at 18:08

## 2021-12-05 RX ADMIN — MEXILETINE HYDROCHLORIDE 200 MILLIGRAM(S): 150 CAPSULE ORAL at 14:36

## 2021-12-05 RX ADMIN — ALBUTEROL 2 PUFF(S): 90 AEROSOL, METERED ORAL at 23:44

## 2021-12-05 RX ADMIN — PANTOPRAZOLE SODIUM 40 MILLIGRAM(S): 20 TABLET, DELAYED RELEASE ORAL at 05:59

## 2021-12-05 RX ADMIN — ALBUTEROL 2 PUFF(S): 90 AEROSOL, METERED ORAL at 05:42

## 2021-12-05 RX ADMIN — Medication 60 MILLIGRAM(S): at 18:07

## 2021-12-05 RX ADMIN — Medication 2 UNIT(S): at 13:27

## 2021-12-05 RX ADMIN — APIXABAN 5 MILLIGRAM(S): 2.5 TABLET, FILM COATED ORAL at 05:59

## 2021-12-05 RX ADMIN — MUPIROCIN 1 APPLICATION(S): 20 OINTMENT TOPICAL at 06:00

## 2021-12-05 RX ADMIN — Medication 2 UNIT(S): at 09:21

## 2021-12-05 RX ADMIN — MUPIROCIN 1 APPLICATION(S): 20 OINTMENT TOPICAL at 18:09

## 2021-12-05 RX ADMIN — SENNA PLUS 2 TABLET(S): 8.6 TABLET ORAL at 22:13

## 2021-12-05 RX ADMIN — Medication 8: at 13:26

## 2021-12-05 RX ADMIN — CEFEPIME 100 MILLIGRAM(S): 1 INJECTION, POWDER, FOR SOLUTION INTRAMUSCULAR; INTRAVENOUS at 06:00

## 2021-12-05 RX ADMIN — MEXILETINE HYDROCHLORIDE 200 MILLIGRAM(S): 150 CAPSULE ORAL at 22:13

## 2021-12-05 RX ADMIN — Medication 1 APPLICATION(S): at 14:46

## 2021-12-05 RX ADMIN — Medication 100 MILLIGRAM(S): at 05:59

## 2021-12-05 RX ADMIN — Medication 60 MILLIGRAM(S): at 05:18

## 2021-12-05 RX ADMIN — ALBUTEROL 2 PUFF(S): 90 AEROSOL, METERED ORAL at 17:05

## 2021-12-05 RX ADMIN — CHLORHEXIDINE GLUCONATE 1 APPLICATION(S): 213 SOLUTION TOPICAL at 06:01

## 2021-12-05 RX ADMIN — SODIUM CHLORIDE 4 MILLILITER(S): 9 INJECTION INTRAMUSCULAR; INTRAVENOUS; SUBCUTANEOUS at 06:26

## 2021-12-05 RX ADMIN — CEFEPIME 100 MILLIGRAM(S): 1 INJECTION, POWDER, FOR SOLUTION INTRAMUSCULAR; INTRAVENOUS at 22:11

## 2021-12-05 RX ADMIN — Medication 100 MILLIGRAM(S): at 22:30

## 2021-12-05 RX ADMIN — BUDESONIDE AND FORMOTEROL FUMARATE DIHYDRATE 2 PUFF(S): 160; 4.5 AEROSOL RESPIRATORY (INHALATION) at 17:05

## 2021-12-05 RX ADMIN — POLYETHYLENE GLYCOL 3350 17 GRAM(S): 17 POWDER, FOR SOLUTION ORAL at 12:05

## 2021-12-05 RX ADMIN — MEXILETINE HYDROCHLORIDE 200 MILLIGRAM(S): 150 CAPSULE ORAL at 05:59

## 2021-12-05 RX ADMIN — Medication 1 APPLICATION(S): at 06:00

## 2021-12-05 RX ADMIN — Medication 2 UNIT(S): at 18:12

## 2021-12-05 RX ADMIN — Medication 2: at 09:21

## 2021-12-05 RX ADMIN — CEFEPIME 100 MILLIGRAM(S): 1 INJECTION, POWDER, FOR SOLUTION INTRAMUSCULAR; INTRAVENOUS at 14:36

## 2021-12-05 RX ADMIN — Medication 1 APPLICATION(S): at 23:01

## 2021-12-05 RX ADMIN — MONTELUKAST 10 MILLIGRAM(S): 4 TABLET, CHEWABLE ORAL at 12:05

## 2021-12-05 RX ADMIN — INSULIN GLARGINE 12 UNIT(S): 100 INJECTION, SOLUTION SUBCUTANEOUS at 22:12

## 2021-12-05 RX ADMIN — Medication 1200 MILLIGRAM(S): at 05:59

## 2021-12-05 RX ADMIN — BUDESONIDE AND FORMOTEROL FUMARATE DIHYDRATE 2 PUFF(S): 160; 4.5 AEROSOL RESPIRATORY (INHALATION) at 05:42

## 2021-12-05 RX ADMIN — ALBUTEROL 2 PUFF(S): 90 AEROSOL, METERED ORAL at 11:06

## 2021-12-05 NOTE — PROGRESS NOTE ADULT - TIME BILLING
reviewing emr, radiology, discussing with team      pt seen and examined   as above: slow improvement-ID f/up-await final sputum results to de-esculate rx-? MURF--additional sputum pending (fungal/afb etc)  multifactorial dyspnea-TBM, CB, severe persistent asthma, PNA, debility, anemia, CAD--O2 NC sat above 90%  PNA-f/up sputum cx=MURF--cefepime D6 of ?7  (ID formal Ketan Liz)  TGI-QG-ndhjzoic/vest rx, incentive spirometry  asthma-medrol 4mg, spiriva/symbicort, duoneb q 6, singulair 10 q hs,  allergy-claritin/flonase  gerd-ppi  abnormal CT-nodule-f/up 3 months                    cards-on mult rx-to continue  PT-OOB    DVT prophylaxis  DC planning reviewing emr, radiology, discussing with team      pt seen and examined   as above: slow improvement-ID f/up-await final sputum results to de-esculate rx-? MURF--additional sputum pending (fungal/afb etc)  multifactorial dyspnea-TBM, CB, severe persistent asthma, PNA, debility, anemia, CAD--O2 NC sat above 90%     CT chest showed lingula consolidation, LLL opacity PNA vs atelectasis, impacted distal bronchioles, 9mm RLL nodule needs f/u in 3 months     PNA-f/up sputum cx=MURF--cefepime D6 of ?7  (ID formal Ketan Liz)  EUM-AX-rmtrowwv/vest rx, incentive spirometry  asthma-medrol 4mg, spiriva/symbicort, duoneb q 6, singulair 10 q hs,  allergy-claritin/flonase  gerd-ppi  abnormal CT-nodule-f/up 3 months                    cards-on mult rx-to continue  PT-OOB    DVT prophylaxis  DC planning

## 2021-12-05 NOTE — PROGRESS NOTE ADULT - SUBJECTIVE AND OBJECTIVE BOX
CHIEF COMPLAINT:     Interval Events:    REVIEW OF SYSTEMS:  Constitutional:   Eyes:  ENT:  CV:  Resp:  GI:  :  MSK:  Integumentary:  Neurological:  Psychiatric:  Endocrine:  Hematologic/Lymphatic:  Allergic/Immunologic:  [ ] All other systems negative  [ ] Unable to assess ROS because ________    OBJECTIVE:  ICU Vital Signs Last 24 Hrs  T(C): 36.8 (05 Dec 2021 14:18), Max: 36.8 (04 Dec 2021 20:44)  T(F): 98.2 (05 Dec 2021 14:18), Max: 98.2 (04 Dec 2021 20:44)  HR: 81 (05 Dec 2021 17:35) (68 - 81)  BP: 136/66 (05 Dec 2021 14:18) (136/66 - 166/72)  BP(mean): --  ABP: --  ABP(mean): --  RR: 18 (05 Dec 2021 17:32) (18 - 18)  SpO2: 97% (05 Dec 2021 17:35) (94% - 100%)        12-04 @ 07:01  -  12-05 @ 07:00  --------------------------------------------------------  IN: 0 mL / OUT: 1250 mL / NET: -1250 mL      CAPILLARY BLOOD GLUCOSE      POCT Blood Glucose.: 315 mg/dL (05 Dec 2021 13:11)      PHYSICAL EXAM:  General:   HEENT:   Lymph Nodes:  Neck:   Respiratory:   Cardiovascular:   Abdomen:   Extremities:   Skin:   Neurological:  Psychiatry:    HOSPITAL MEDICATIONS:  MEDICATIONS  (STANDING):  ALBUTerol    90 MICROgram(s) HFA Inhaler 2 Puff(s) Inhalation every 6 hours  apixaban 5 milliGRAM(s) Oral every 12 hours  budesonide 160 MICROgram(s)/formoterol 4.5 MICROgram(s) Inhaler 2 Puff(s) Inhalation two times a day  cefepime   IVPB 1000 milliGRAM(s) IV Intermittent every 8 hours  cefepime   IVPB      chlorhexidine 4% Liquid 1 Application(s) Topical <User Schedule>  dextrose 40% Gel 15 Gram(s) Oral once  dextrose 5%. 1000 milliLiter(s) (50 mL/Hr) IV Continuous <Continuous>  dextrose 5%. 1000 milliLiter(s) (100 mL/Hr) IV Continuous <Continuous>  dextrose 5%. 1000 milliLiter(s) (100 mL/Hr) IV Continuous <Continuous>  dextrose 50% Injectable 12.5 Gram(s) IV Push once  dextrose 50% Injectable 25 Gram(s) IV Push once  dextrose 50% Injectable 25 Gram(s) IV Push once  diltiazem    Tablet 60 milliGRAM(s) Oral every 12 hours  ferrous    sulfate 325 milliGRAM(s) Oral <User Schedule>  glucagon  Injectable 1 milliGRAM(s) IntraMuscular once  glucagon  Injectable 1 milliGRAM(s) IntraMuscular once  guaiFENesin ER 1200 milliGRAM(s) Oral every 12 hours  insulin glargine Injectable (LANTUS) 12 Unit(s) SubCutaneous at bedtime  insulin lispro (ADMELOG) corrective regimen sliding scale   SubCutaneous three times a day before meals  insulin lispro (ADMELOG) corrective regimen sliding scale   SubCutaneous at bedtime  insulin lispro Injectable (ADMELOG) 2 Unit(s) SubCutaneous three times a day before meals  methylPREDNISolone 4 milliGRAM(s) Oral daily  mexiletine 200 milliGRAM(s) Oral three times a day  montelukast 10 milliGRAM(s) Oral daily  mupirocin 2% Nasal 1 Application(s) Nasal two times a day  pantoprazole    Tablet 40 milliGRAM(s) Oral before breakfast  petrolatum white Ointment 1 Application(s) Topical three times a day  polyethylene glycol 3350 17 Gram(s) Oral daily  senna 2 Tablet(s) Oral at bedtime  sodium chloride 0.65% Nasal 1 Spray(s) Both Nostrils two times a day  sodium chloride 7% Inhalation 4 milliLiter(s) Inhalation two times a day  tiotropium 18 MICROgram(s) Capsule 1 Capsule(s) Inhalation daily    MEDICATIONS  (PRN):  acetaminophen     Tablet .. 650 milliGRAM(s) Oral every 6 hours PRN Temp greater or equal to 38C (100.4F), Mild Pain (1 - 3)  aluminum hydroxide/magnesium hydroxide/simethicone Suspension 30 milliLiter(s) Oral every 4 hours PRN Dyspepsia  benzonatate 100 milliGRAM(s) Oral three times a day PRN Cough  bisacodyl 5 milliGRAM(s) Oral every 12 hours PRN Constipation  melatonin 3 milliGRAM(s) Oral at bedtime PRN Insomnia  metoclopramide Injectable 10 milliGRAM(s) IV Push every 8 hours PRN nausea      LABS:                        11.3   9.02  )-----------( 317      ( 05 Dec 2021 07:15 )             37.2     12-05    139  |  100  |  16  ----------------------------<  170<H>  4.0   |  28  |  0.67    Ca    8.9      05 Dec 2021 07:16  Phos  2.9     12-05  Mg     2.1     12-05    TPro  6.3  /  Alb  3.3  /  TBili  0.1<L>  /  DBili  x   /  AST  13  /  ALT  13  /  AlkPhos  86  12-05              MICROBIOLOGY:     RADIOLOGY:  [ ] Reviewed and interpreted by me    PULMONARY FUNCTION TESTS:    EKG: CHIEF COMPLAINT: f/up TBM, bronchiectasis, severe persistent asthma, CB, PNA-better over all- less cough    Interval Events: ambulating    REVIEW OF SYSTEMS:  Constitutional: No fevers or chills. No weight loss. + fatigue or generalized malaise.  Eyes: No itching or discharge from the eyes  ENT: No ear pain. No ear discharge. No nasal congestion. No post nasal drip. No epistaxis. No throat pain. No sore throat. No difficulty swallowing.   CV: No chest pain. No palpitations. No lightheadedness or dizziness.   Resp: No dyspnea at rest. + dyspnea on exertion. No orthopnea. + wheezing. + cough. No stridor. No sputum production. No chest pain with respiration.  GI: No nausea. No vomiting. No diarrhea.  MSK: No joint pain or pain in any extremities  Integumentary: No skin lesions. No pedal edema.  Neurological: No gross motor weakness. No sensory changes.  [+ ] All other systems negative  [ ] Unable to assess ROS because ________          PHYSICAL EXAM: NAD in bed on RA  General: Awake, alert, oriented X 3.   HEENT: Atraumatic, normocephalic.                 Mallampatti Grade 2                No nasal congestion.                No tonsillar or pharyngeal exudates.  Lymph Nodes: No palpable lymphadenopathy  Neck: No JVD. No carotid bruit.   Respiratory: Normal chest expansion                         Normal percussion                         Normal and equal air entry                         mild exp wheeze,no rhonchi or rales.  Cardiovascular: S1 S2 normal. No murmurs, rubs or gallops.   Abdomen: Soft, non-tender, non-distended. No organomegaly. Normoactive bowel sounds.  Extremities: Warm to touch. Peripheral pulse palpable. No pedal edema.   Skin: No rashes or skin lesions  Neurological: Motor and sensory examination equal and normal in all four extremities.  Psychiatry: Appropriate mood and affect.        OBJECTIVE:  ICU Vital Signs Last 24 Hrs  T(C): 36.8 (05 Dec 2021 14:18), Max: 36.8 (04 Dec 2021 20:44)  T(F): 98.2 (05 Dec 2021 14:18), Max: 98.2 (04 Dec 2021 20:44)  HR: 81 (05 Dec 2021 17:35) (68 - 81)  BP: 136/66 (05 Dec 2021 14:18) (136/66 - 166/72)  BP(mean): --  ABP: --  ABP(mean): --  RR: 18 (05 Dec 2021 17:32) (18 - 18)  SpO2: 97% (05 Dec 2021 17:35) (94% - 100%)        12-04 @ 07:01  -  12-05 @ 07:00  --------------------------------------------------------  IN: 0 mL / OUT: 1250 mL / NET: -1250 mL      CAPILLARY BLOOD GLUCOSE      POCT Blood Glucose.: 315 mg/dL (05 Dec 2021 13:11)        HOSPITAL MEDICATIONS:  MEDICATIONS  (STANDING):  ALBUTerol    90 MICROgram(s) HFA Inhaler 2 Puff(s) Inhalation every 6 hours  apixaban 5 milliGRAM(s) Oral every 12 hours  budesonide 160 MICROgram(s)/formoterol 4.5 MICROgram(s) Inhaler 2 Puff(s) Inhalation two times a day  cefepime   IVPB 1000 milliGRAM(s) IV Intermittent every 8 hours  cefepime   IVPB      chlorhexidine 4% Liquid 1 Application(s) Topical <User Schedule>  dextrose 40% Gel 15 Gram(s) Oral once  dextrose 5%. 1000 milliLiter(s) (50 mL/Hr) IV Continuous <Continuous>  dextrose 5%. 1000 milliLiter(s) (100 mL/Hr) IV Continuous <Continuous>  dextrose 5%. 1000 milliLiter(s) (100 mL/Hr) IV Continuous <Continuous>  dextrose 50% Injectable 12.5 Gram(s) IV Push once  dextrose 50% Injectable 25 Gram(s) IV Push once  dextrose 50% Injectable 25 Gram(s) IV Push once  diltiazem    Tablet 60 milliGRAM(s) Oral every 12 hours  ferrous    sulfate 325 milliGRAM(s) Oral <User Schedule>  glucagon  Injectable 1 milliGRAM(s) IntraMuscular once  glucagon  Injectable 1 milliGRAM(s) IntraMuscular once  guaiFENesin ER 1200 milliGRAM(s) Oral every 12 hours  insulin glargine Injectable (LANTUS) 12 Unit(s) SubCutaneous at bedtime  insulin lispro (ADMELOG) corrective regimen sliding scale   SubCutaneous three times a day before meals  insulin lispro (ADMELOG) corrective regimen sliding scale   SubCutaneous at bedtime  insulin lispro Injectable (ADMELOG) 2 Unit(s) SubCutaneous three times a day before meals  methylPREDNISolone 4 milliGRAM(s) Oral daily  mexiletine 200 milliGRAM(s) Oral three times a day  montelukast 10 milliGRAM(s) Oral daily  mupirocin 2% Nasal 1 Application(s) Nasal two times a day  pantoprazole    Tablet 40 milliGRAM(s) Oral before breakfast  petrolatum white Ointment 1 Application(s) Topical three times a day  polyethylene glycol 3350 17 Gram(s) Oral daily  senna 2 Tablet(s) Oral at bedtime  sodium chloride 0.65% Nasal 1 Spray(s) Both Nostrils two times a day  sodium chloride 7% Inhalation 4 milliLiter(s) Inhalation two times a day  tiotropium 18 MICROgram(s) Capsule 1 Capsule(s) Inhalation daily    MEDICATIONS  (PRN):  acetaminophen     Tablet .. 650 milliGRAM(s) Oral every 6 hours PRN Temp greater or equal to 38C (100.4F), Mild Pain (1 - 3)  aluminum hydroxide/magnesium hydroxide/simethicone Suspension 30 milliLiter(s) Oral every 4 hours PRN Dyspepsia  benzonatate 100 milliGRAM(s) Oral three times a day PRN Cough  bisacodyl 5 milliGRAM(s) Oral every 12 hours PRN Constipation  melatonin 3 milliGRAM(s) Oral at bedtime PRN Insomnia  metoclopramide Injectable 10 milliGRAM(s) IV Push every 8 hours PRN nausea      LABS:                        11.3   9.02  )-----------( 317      ( 05 Dec 2021 07:15 )             37.2     12-05    139  |  100  |  16  ----------------------------<  170<H>  4.0   |  28  |  0.67    Ca    8.9      05 Dec 2021 07:16  Phos  2.9     12-05  Mg     2.1     12-05    TPro  6.3  /  Alb  3.3  /  TBili  0.1<L>  /  DBili  x   /  AST  13  /  ALT  13  /  AlkPhos  86  12-05      < from: CT Chest No Cont (11.29.21 @ 10:28) >  EXAM:  CT CHEST                            PROCEDURE DATE:  11/29/2021            INTERPRETATION:  CLINICAL INFORMATION: Shortness of breath.    COMPARISON: Chest radiograph 11/28/2021 and CT chest/abdomen/pelvis 7/26/2021.    CONTRAST/COMPLICATIONS:  IV Contrast: NONE  Oral Contrast: NONE  Complications: None reported at time of study completion    PROCEDURE:  CT of the Chest was performed.  Sagittal and coronal reformats were performed.    FINDINGS:    LUNGS AND AIRWAYS: Patent central airways. Consolidation in the lingula, compatible with pneumonia. Medial left lower lobe opacity which may represent atelectasis versus pneumonia. Scattered tree-in-bud opacities involving the right middle, right lower, and left upper lobes, consistent withimpacted distal bronchioles. New 9 mm right lower lobe subpleural nodule (2:88).  PLEURA: No pleural effusion.  MEDIASTINUM AND MARANDA: No lymphadenopathy.  VESSELS: Aortic calcifications.  HEART: Heart size is normal. Trace pericardial effusion. Aortic valve and coronary artery calcifications.  CHEST WALL AND LOWER NECK: Right chest wall infusion port with tip in the SVC.  VISUALIZED UPPER ABDOMEN: Calcified granuloma in the liver. Nonobstructing left renal calculus. Scattered pancreatic cysts, similar to prior.  BONES: Unchanged appearance of the osseous structures. Degenerative changes.    IMPRESSION:  Consolidation in the lingula, compatible with pneumonia.    Left lower lung opacity which may represent atelectasis versus pneumonia.    Scattered tree-in-bud opacities consistent with impacted distal bronchioles.    New 9 mm right lower lobe subpleural nodule, possibly an intrapulmonary lymph node. 3 month follow-up is recommended.    < end of copied text >          MICROBIOLOGY:     RADIOLOGY:  [ ] Reviewed and interpreted by me    PULMONARY FUNCTION TESTS:    EKG:

## 2021-12-05 NOTE — PROGRESS NOTE ADULT - PROBLEM SELECTOR PLAN 1
#chest discomfort and SOB  Echo 11/2021 - no significant changes from previous echo. Mild diastolic dysfunction w/ normal LV . Hx of pseudomonas in Feb 2020.  Received suraj 1g in ED.  - f/u blood and sputum cx, urine legionella   - CT chest showed lingula consolidation, LLL opacity PNA vs atelectasis, impacted distal bronchioles, 9mm RLL nodule needs f/u in 3 months   - c/w cefipime (11/29- )  - ID consult  - Cards consult for continued chest discomfort rec'd conservative management   - repeat sputum culture; fungal and AFB sputum culture  - now breathing comfortably on RA

## 2021-12-05 NOTE — PROGRESS NOTE ADULT - ATTENDING COMMENTS
I agree with the above findings, assessment, and plan with the following additions and exceptions:     #presumed gram negative pneumonia -- hx of psa in the past  -C/w cefepime for 7 day course.  -Monitor cx data  -Chest vest percussion should be done aggressively.   -Pulm and ID on the case.    #Afib  -C/w mexiletine and diltiazem   -C/w Eliquis; monitor for bleeding; none.    #AI  -C/w home steroids    #Anxiety -- reported anxiety related to being woken up by room mate who was yelling.  Requesting EKG and Trop. The latter is negative.  EKG pending.  We have a low suspicion for acs given lack of anginal equivalents.     All consultant contribution to care greatly appreciated.     Rest of plan as above    Dr. Scott Funez, DO  Hospitalist  Division of Hospital Medicine  [facility]  Available via [employer] Teams

## 2021-12-05 NOTE — PROGRESS NOTE ADULT - ASSESSMENT
Assessment and Plan:   · Assessment	   70 y/o F w/tracheobronchomalacia s/p tracheobronchoplasty, severe persistent asthma, bronchiectasis, and colon cancer s/p colectomy admitted with likely exacerbation of bronchiectasis due to pneumonia.    - Supplemental O2 as needed goal O2 sat >= 90%  - Broad spectrum abx including pseudomonal coverage  - Airway clearance, acapella device, bronchodilators, chest PT  - Continue home asthma regimen  - Repeat CT scan in 3 months for follow up of new pulmonary nodule  ***************************************  11/30-better over all                                                                                    12/1-better but still sx, ID evaln-await sputum results  12/2-better over all-less sx  12/3-still improving-completing ABX  12/5 feels better

## 2021-12-05 NOTE — PROGRESS NOTE ADULT - SUBJECTIVE AND OBJECTIVE BOX
Authored by Olive Caceres MD, PGY2  PATIENT:  RAYRAY RODRIGUEZ  [id number]    CHIEF COMPLAINT:  Patient is a 71y old  Female who presents with a chief complaint of chest discomfort (04 Dec 2021 07:11)      INTERVAL HISTORY OVERNIGHT EVENTS: ERIKA overnight. Day 7/7 of cefepime.         MEDICATIONS:  MEDICATIONS  (STANDING):  ALBUTerol    90 MICROgram(s) HFA Inhaler 2 Puff(s) Inhalation every 6 hours  apixaban 5 milliGRAM(s) Oral every 12 hours  budesonide 160 MICROgram(s)/formoterol 4.5 MICROgram(s) Inhaler 2 Puff(s) Inhalation two times a day  cefepime   IVPB 1000 milliGRAM(s) IV Intermittent every 8 hours  cefepime   IVPB      chlorhexidine 4% Liquid 1 Application(s) Topical <User Schedule>  dextrose 40% Gel 15 Gram(s) Oral once  dextrose 5%. 1000 milliLiter(s) (50 mL/Hr) IV Continuous <Continuous>  dextrose 5%. 1000 milliLiter(s) (100 mL/Hr) IV Continuous <Continuous>  dextrose 5%. 1000 milliLiter(s) (100 mL/Hr) IV Continuous <Continuous>  dextrose 50% Injectable 25 Gram(s) IV Push once  dextrose 50% Injectable 12.5 Gram(s) IV Push once  dextrose 50% Injectable 25 Gram(s) IV Push once  diltiazem    Tablet 60 milliGRAM(s) Oral every 12 hours  ferrous    sulfate 325 milliGRAM(s) Oral <User Schedule>  glucagon  Injectable 1 milliGRAM(s) IntraMuscular once  glucagon  Injectable 1 milliGRAM(s) IntraMuscular once  guaiFENesin ER 1200 milliGRAM(s) Oral every 12 hours  insulin glargine Injectable (LANTUS) 12 Unit(s) SubCutaneous at bedtime  insulin lispro (ADMELOG) corrective regimen sliding scale   SubCutaneous three times a day before meals  insulin lispro (ADMELOG) corrective regimen sliding scale   SubCutaneous at bedtime  insulin lispro Injectable (ADMELOG) 2 Unit(s) SubCutaneous three times a day before meals  methylPREDNISolone 4 milliGRAM(s) Oral daily  mexiletine 200 milliGRAM(s) Oral three times a day  montelukast 10 milliGRAM(s) Oral daily  mupirocin 2% Nasal 1 Application(s) Nasal two times a day  pantoprazole    Tablet 40 milliGRAM(s) Oral before breakfast  petrolatum white Ointment 1 Application(s) Topical three times a day  polyethylene glycol 3350 17 Gram(s) Oral daily  senna 2 Tablet(s) Oral at bedtime  sodium chloride 0.65% Nasal 1 Spray(s) Both Nostrils two times a day  sodium chloride 7% Inhalation 4 milliLiter(s) Inhalation two times a day  tiotropium 18 MICROgram(s) Capsule 1 Capsule(s) Inhalation daily    MEDICATIONS  (PRN):  acetaminophen     Tablet .. 650 milliGRAM(s) Oral every 6 hours PRN Temp greater or equal to 38C (100.4F), Mild Pain (1 - 3)  aluminum hydroxide/magnesium hydroxide/simethicone Suspension 30 milliLiter(s) Oral every 4 hours PRN Dyspepsia  benzonatate 100 milliGRAM(s) Oral three times a day PRN Cough  bisacodyl 5 milliGRAM(s) Oral every 12 hours PRN Constipation  melatonin 3 milliGRAM(s) Oral at bedtime PRN Insomnia  metoclopramide Injectable 10 milliGRAM(s) IV Push every 8 hours PRN nausea      ALLERGIES:  Allergies    ampicillin (Short breath)  aspirin (Short breath)  Avelox (Short breath; Pruritus)  codeine (Short breath)  Dilaudid (Short breath)  iodine (Short breath; Swelling)  penicillin (Short breath)  shellfish (Anaphylaxis)  tetanus toxoid (Short breath)  Valium (Short breath)    Intolerances        OBJECTIVE:  ICU Vital Signs Last 24 Hrs  T(C): 36.8 (05 Dec 2021 05:09), Max: 36.8 (04 Dec 2021 20:44)  T(F): 98.2 (05 Dec 2021 05:09), Max: 98.2 (04 Dec 2021 20:44)  HR: 73 (05 Dec 2021 05:52) (65 - 79)  BP: 158/63 (05 Dec 2021 05:52) (125/70 - 166/72)  BP(mean): --  ABP: --  ABP(mean): --  RR: 18 (05 Dec 2021 05:09) (18 - 20)  SpO2: 100% (05 Dec 2021 05:09) (94% - 100%)      CAPILLARY BLOOD GLUCOSE      POCT Blood Glucose.: 172 mg/dL (04 Dec 2021 21:34)  POCT Blood Glucose.: 183 mg/dL (04 Dec 2021 17:37)  POCT Blood Glucose.: 303 mg/dL (04 Dec 2021 13:15)  POCT Blood Glucose.: 155 mg/dL (04 Dec 2021 08:42)    CAPILLARY BLOOD GLUCOSE      POCT Blood Glucose.: 172 mg/dL (04 Dec 2021 21:34)    I&O's Summary    04 Dec 2021 07:01  -  05 Dec 2021 07:00  --------------------------------------------------------  IN: 0 mL / OUT: 1250 mL / NET: -1250 mL          PHYSICAL EXAMINATION:  GENERAL: NAD, lying in bed comfortably  HEAD:  Atraumatic, Normocephalic  EYES:  R eye injected, L eye false eye  CHEST/LUNG: on RA. Crackles b/l lower lobes improved from previous exam. Cough on deep inspiration.    HEART: Regular rate and rhythm; No murmurs, rubs, or gallops, normal S1/S2  ABDOMEN: normal bowel sounds; Soft, nontender, nondistended, no organomegaly.  + colostomy   EXTREMITIES:  2+ Peripheral Pulses, brisk capillary refill. No clubbing, cyanosis, or edema  MSK: No gross deformities noted   LABS:                          11.3   9.02  )-----------( 317      ( 05 Dec 2021 07:15 )             37.2     12-05    139  |  100  |  16  ----------------------------<  170<H>  4.0   |  28  |  0.67    Ca    8.9      05 Dec 2021 07:16  Phos  2.9     12-05  Mg     2.1     12-05    TPro  6.3  /  Alb  3.3  /  TBili  0.1<L>  /  DBili  x   /  AST  13  /  ALT  13  /  AlkPhos  86  12-05    LIVER FUNCTIONS - ( 05 Dec 2021 07:16 )  Alb: 3.3 g/dL / Pro: 6.3 g/dL / ALK PHOS: 86 U/L / ALT: 13 U/L / AST: 13 U/L / GGT: x                       TELEMETRY:     EKG:     IMAGING:       Authored by Olive Caceres MD, PGY2  PATIENT:  RAYRAY RODRIGUEZ  [id number]    CHIEF COMPLAINT:  Patient is a 71y old  Female who presents with a chief complaint of chest discomfort (04 Dec 2021 07:11)      INTERVAL HISTORY OVERNIGHT EVENTS: ERIKA overnight. Day 7/7 cefepime, cultures unremarkable.           MEDICATIONS:  MEDICATIONS  (STANDING):  ALBUTerol    90 MICROgram(s) HFA Inhaler 2 Puff(s) Inhalation every 6 hours  apixaban 5 milliGRAM(s) Oral every 12 hours  budesonide 160 MICROgram(s)/formoterol 4.5 MICROgram(s) Inhaler 2 Puff(s) Inhalation two times a day  cefepime   IVPB 1000 milliGRAM(s) IV Intermittent every 8 hours  cefepime   IVPB      chlorhexidine 4% Liquid 1 Application(s) Topical <User Schedule>  dextrose 40% Gel 15 Gram(s) Oral once  dextrose 5%. 1000 milliLiter(s) (50 mL/Hr) IV Continuous <Continuous>  dextrose 5%. 1000 milliLiter(s) (100 mL/Hr) IV Continuous <Continuous>  dextrose 5%. 1000 milliLiter(s) (100 mL/Hr) IV Continuous <Continuous>  dextrose 50% Injectable 25 Gram(s) IV Push once  dextrose 50% Injectable 12.5 Gram(s) IV Push once  dextrose 50% Injectable 25 Gram(s) IV Push once  diltiazem    Tablet 60 milliGRAM(s) Oral every 12 hours  ferrous    sulfate 325 milliGRAM(s) Oral <User Schedule>  glucagon  Injectable 1 milliGRAM(s) IntraMuscular once  glucagon  Injectable 1 milliGRAM(s) IntraMuscular once  guaiFENesin ER 1200 milliGRAM(s) Oral every 12 hours  insulin glargine Injectable (LANTUS) 12 Unit(s) SubCutaneous at bedtime  insulin lispro (ADMELOG) corrective regimen sliding scale   SubCutaneous three times a day before meals  insulin lispro (ADMELOG) corrective regimen sliding scale   SubCutaneous at bedtime  insulin lispro Injectable (ADMELOG) 2 Unit(s) SubCutaneous three times a day before meals  methylPREDNISolone 4 milliGRAM(s) Oral daily  mexiletine 200 milliGRAM(s) Oral three times a day  montelukast 10 milliGRAM(s) Oral daily  mupirocin 2% Nasal 1 Application(s) Nasal two times a day  pantoprazole    Tablet 40 milliGRAM(s) Oral before breakfast  petrolatum white Ointment 1 Application(s) Topical three times a day  polyethylene glycol 3350 17 Gram(s) Oral daily  senna 2 Tablet(s) Oral at bedtime  sodium chloride 0.65% Nasal 1 Spray(s) Both Nostrils two times a day  sodium chloride 7% Inhalation 4 milliLiter(s) Inhalation two times a day  tiotropium 18 MICROgram(s) Capsule 1 Capsule(s) Inhalation daily    MEDICATIONS  (PRN):  acetaminophen     Tablet .. 650 milliGRAM(s) Oral every 6 hours PRN Temp greater or equal to 38C (100.4F), Mild Pain (1 - 3)  aluminum hydroxide/magnesium hydroxide/simethicone Suspension 30 milliLiter(s) Oral every 4 hours PRN Dyspepsia  benzonatate 100 milliGRAM(s) Oral three times a day PRN Cough  bisacodyl 5 milliGRAM(s) Oral every 12 hours PRN Constipation  melatonin 3 milliGRAM(s) Oral at bedtime PRN Insomnia  metoclopramide Injectable 10 milliGRAM(s) IV Push every 8 hours PRN nausea      ALLERGIES:  Allergies    ampicillin (Short breath)  aspirin (Short breath)  Avelox (Short breath; Pruritus)  codeine (Short breath)  Dilaudid (Short breath)  iodine (Short breath; Swelling)  penicillin (Short breath)  shellfish (Anaphylaxis)  tetanus toxoid (Short breath)  Valium (Short breath)    Intolerances        OBJECTIVE:  ICU Vital Signs Last 24 Hrs  T(C): 36.8 (05 Dec 2021 05:09), Max: 36.8 (04 Dec 2021 20:44)  T(F): 98.2 (05 Dec 2021 05:09), Max: 98.2 (04 Dec 2021 20:44)  HR: 73 (05 Dec 2021 05:52) (65 - 79)  BP: 158/63 (05 Dec 2021 05:52) (125/70 - 166/72)  BP(mean): --  ABP: --  ABP(mean): --  RR: 18 (05 Dec 2021 05:09) (18 - 20)  SpO2: 100% (05 Dec 2021 05:09) (94% - 100%)      CAPILLARY BLOOD GLUCOSE      POCT Blood Glucose.: 172 mg/dL (04 Dec 2021 21:34)  POCT Blood Glucose.: 183 mg/dL (04 Dec 2021 17:37)  POCT Blood Glucose.: 303 mg/dL (04 Dec 2021 13:15)  POCT Blood Glucose.: 155 mg/dL (04 Dec 2021 08:42)    CAPILLARY BLOOD GLUCOSE      POCT Blood Glucose.: 172 mg/dL (04 Dec 2021 21:34)    I&O's Summary    04 Dec 2021 07:01  -  05 Dec 2021 07:00  --------------------------------------------------------  IN: 0 mL / OUT: 1250 mL / NET: -1250 mL          PHYSICAL EXAMINATION:  GENERAL: NAD, lying in bed comfortably  HEAD:  Atraumatic, Normocephalic  EYES:  R eye injected, L eye false eye  CHEST/LUNG: on RA. Crackles b/l lower lobes improved from previous exam. Cough on deep inspiration.    HEART: Regular rate and rhythm; No murmurs, rubs, or gallops, normal S1/S2  ABDOMEN: normal bowel sounds; Soft, nontender, nondistended, no organomegaly.  + colostomy   EXTREMITIES:  2+ Peripheral Pulses, brisk capillary refill. No clubbing, cyanosis, or edema  MSK: No gross deformities noted   LABS:                          11.3   9.02  )-----------( 317      ( 05 Dec 2021 07:15 )             37.2     12-05    139  |  100  |  16  ----------------------------<  170<H>  4.0   |  28  |  0.67    Ca    8.9      05 Dec 2021 07:16  Phos  2.9     12-05  Mg     2.1     12-05    TPro  6.3  /  Alb  3.3  /  TBili  0.1<L>  /  DBili  x   /  AST  13  /  ALT  13  /  AlkPhos  86  12-05    LIVER FUNCTIONS - ( 05 Dec 2021 07:16 )  Alb: 3.3 g/dL / Pro: 6.3 g/dL / ALK PHOS: 86 U/L / ALT: 13 U/L / AST: 13 U/L / GGT: x                       TELEMETRY:     EKG:     IMAGING:

## 2021-12-06 ENCOUNTER — TRANSCRIPTION ENCOUNTER (OUTPATIENT)
Age: 71
End: 2021-12-06

## 2021-12-06 VITALS
OXYGEN SATURATION: 96 % | HEART RATE: 102 BPM | RESPIRATION RATE: 18 BRPM | TEMPERATURE: 97 F | SYSTOLIC BLOOD PRESSURE: 134 MMHG | DIASTOLIC BLOOD PRESSURE: 66 MMHG

## 2021-12-06 LAB
ALBUMIN SERPL ELPH-MCNC: 3.4 G/DL — SIGNIFICANT CHANGE UP (ref 3.3–5)
ALP SERPL-CCNC: 84 U/L — SIGNIFICANT CHANGE UP (ref 40–120)
ALT FLD-CCNC: 15 U/L — SIGNIFICANT CHANGE UP (ref 10–45)
ANION GAP SERPL CALC-SCNC: 9 MMOL/L — SIGNIFICANT CHANGE UP (ref 5–17)
AST SERPL-CCNC: 16 U/L — SIGNIFICANT CHANGE UP (ref 10–40)
BASOPHILS # BLD AUTO: 0.04 K/UL — SIGNIFICANT CHANGE UP (ref 0–0.2)
BASOPHILS NFR BLD AUTO: 0.5 % — SIGNIFICANT CHANGE UP (ref 0–2)
BILIRUB SERPL-MCNC: 0.1 MG/DL — LOW (ref 0.2–1.2)
BUN SERPL-MCNC: 13 MG/DL — SIGNIFICANT CHANGE UP (ref 7–23)
CALCIUM SERPL-MCNC: 8.9 MG/DL — SIGNIFICANT CHANGE UP (ref 8.4–10.5)
CHLORIDE SERPL-SCNC: 104 MMOL/L — SIGNIFICANT CHANGE UP (ref 96–108)
CO2 SERPL-SCNC: 27 MMOL/L — SIGNIFICANT CHANGE UP (ref 22–31)
CREAT SERPL-MCNC: 0.58 MG/DL — SIGNIFICANT CHANGE UP (ref 0.5–1.3)
EOSINOPHIL # BLD AUTO: 0.12 K/UL — SIGNIFICANT CHANGE UP (ref 0–0.5)
EOSINOPHIL NFR BLD AUTO: 1.4 % — SIGNIFICANT CHANGE UP (ref 0–6)
FUNGITELL: <31 PG/ML — SIGNIFICANT CHANGE UP
GLUCOSE BLDC GLUCOMTR-MCNC: 246 MG/DL — HIGH (ref 70–99)
GLUCOSE SERPL-MCNC: 149 MG/DL — HIGH (ref 70–99)
HCT VFR BLD CALC: 37.1 % — SIGNIFICANT CHANGE UP (ref 34.5–45)
HGB BLD-MCNC: 11.3 G/DL — LOW (ref 11.5–15.5)
IMM GRANULOCYTES NFR BLD AUTO: 2.3 % — HIGH (ref 0–1.5)
LYMPHOCYTES # BLD AUTO: 2.22 K/UL — SIGNIFICANT CHANGE UP (ref 1–3.3)
LYMPHOCYTES # BLD AUTO: 25.1 % — SIGNIFICANT CHANGE UP (ref 13–44)
MAGNESIUM SERPL-MCNC: 2.1 MG/DL — SIGNIFICANT CHANGE UP (ref 1.6–2.6)
MCHC RBC-ENTMCNC: 23.8 PG — LOW (ref 27–34)
MCHC RBC-ENTMCNC: 30.5 GM/DL — LOW (ref 32–36)
MCV RBC AUTO: 78.1 FL — LOW (ref 80–100)
MONOCYTES # BLD AUTO: 1.04 K/UL — HIGH (ref 0–0.9)
MONOCYTES NFR BLD AUTO: 11.8 % — SIGNIFICANT CHANGE UP (ref 2–14)
NEUTROPHILS # BLD AUTO: 5.23 K/UL — SIGNIFICANT CHANGE UP (ref 1.8–7.4)
NEUTROPHILS NFR BLD AUTO: 58.9 % — SIGNIFICANT CHANGE UP (ref 43–77)
NRBC # BLD: 0 /100 WBCS — SIGNIFICANT CHANGE UP (ref 0–0)
PHOSPHATE SERPL-MCNC: 3.5 MG/DL — SIGNIFICANT CHANGE UP (ref 2.5–4.5)
PLATELET # BLD AUTO: 348 K/UL — SIGNIFICANT CHANGE UP (ref 150–400)
POTASSIUM SERPL-MCNC: 3.9 MMOL/L — SIGNIFICANT CHANGE UP (ref 3.5–5.3)
POTASSIUM SERPL-SCNC: 3.9 MMOL/L — SIGNIFICANT CHANGE UP (ref 3.5–5.3)
PROT SERPL-MCNC: 6.2 G/DL — SIGNIFICANT CHANGE UP (ref 6–8.3)
RBC # BLD: 4.75 M/UL — SIGNIFICANT CHANGE UP (ref 3.8–5.2)
RBC # FLD: 15.7 % — HIGH (ref 10.3–14.5)
SODIUM SERPL-SCNC: 140 MMOL/L — SIGNIFICANT CHANGE UP (ref 135–145)
WBC # BLD: 8.85 K/UL — SIGNIFICANT CHANGE UP (ref 3.8–10.5)
WBC # FLD AUTO: 8.85 K/UL — SIGNIFICANT CHANGE UP (ref 3.8–10.5)

## 2021-12-06 PROCEDURE — 99232 SBSQ HOSP IP/OBS MODERATE 35: CPT

## 2021-12-06 PROCEDURE — 99239 HOSP IP/OBS DSCHRG MGMT >30: CPT

## 2021-12-06 RX ORDER — INSULIN LISPRO 100/ML
2 VIAL (ML) SUBCUTANEOUS
Qty: 0 | Refills: 0 | DISCHARGE
Start: 2021-12-06

## 2021-12-06 RX ORDER — CX-024414 0.2 MG/ML
0.25 INJECTION, SUSPENSION INTRAMUSCULAR ONCE
Refills: 0 | Status: COMPLETED | OUTPATIENT
Start: 2021-12-06 | End: 2021-12-06

## 2021-12-06 RX ORDER — INSULIN LISPRO 100/ML
0 VIAL (ML) SUBCUTANEOUS
Qty: 0 | Refills: 0 | DISCHARGE

## 2021-12-06 RX ORDER — INSULIN GLARGINE 100 [IU]/ML
15 INJECTION, SOLUTION SUBCUTANEOUS
Qty: 0 | Refills: 0 | DISCHARGE

## 2021-12-06 RX ADMIN — Medication 2 UNIT(S): at 12:13

## 2021-12-06 RX ADMIN — TIOTROPIUM BROMIDE 1 CAPSULE(S): 18 CAPSULE ORAL; RESPIRATORY (INHALATION) at 13:44

## 2021-12-06 RX ADMIN — Medication 100 MILLIGRAM(S): at 06:20

## 2021-12-06 RX ADMIN — MEXILETINE HYDROCHLORIDE 200 MILLIGRAM(S): 150 CAPSULE ORAL at 06:17

## 2021-12-06 RX ADMIN — BUDESONIDE AND FORMOTEROL FUMARATE DIHYDRATE 2 PUFF(S): 160; 4.5 AEROSOL RESPIRATORY (INHALATION) at 06:02

## 2021-12-06 RX ADMIN — Medication 1 BOTTLE: at 06:15

## 2021-12-06 RX ADMIN — Medication 10 MILLIGRAM(S): at 10:44

## 2021-12-06 RX ADMIN — Medication 4: at 12:13

## 2021-12-06 RX ADMIN — CEFEPIME 100 MILLIGRAM(S): 1 INJECTION, POWDER, FOR SOLUTION INTRAMUSCULAR; INTRAVENOUS at 06:16

## 2021-12-06 RX ADMIN — Medication 2: at 08:42

## 2021-12-06 RX ADMIN — POLYETHYLENE GLYCOL 3350 17 GRAM(S): 17 POWDER, FOR SOLUTION ORAL at 12:14

## 2021-12-06 RX ADMIN — ALBUTEROL 2 PUFF(S): 90 AEROSOL, METERED ORAL at 11:12

## 2021-12-06 RX ADMIN — ALBUTEROL 2 PUFF(S): 90 AEROSOL, METERED ORAL at 06:02

## 2021-12-06 RX ADMIN — Medication 1 APPLICATION(S): at 06:22

## 2021-12-06 RX ADMIN — MUPIROCIN 1 APPLICATION(S): 20 OINTMENT TOPICAL at 06:22

## 2021-12-06 RX ADMIN — Medication 60 MILLIGRAM(S): at 06:16

## 2021-12-06 RX ADMIN — Medication 1 APPLICATION(S): at 14:48

## 2021-12-06 RX ADMIN — CX-024414 0.25 MILLILITER(S): 0.2 INJECTION, SUSPENSION INTRAMUSCULAR at 17:12

## 2021-12-06 RX ADMIN — Medication 2 UNIT(S): at 08:43

## 2021-12-06 RX ADMIN — Medication 1200 MILLIGRAM(S): at 06:20

## 2021-12-06 RX ADMIN — APIXABAN 5 MILLIGRAM(S): 2.5 TABLET, FILM COATED ORAL at 06:15

## 2021-12-06 RX ADMIN — MEXILETINE HYDROCHLORIDE 200 MILLIGRAM(S): 150 CAPSULE ORAL at 14:41

## 2021-12-06 RX ADMIN — MONTELUKAST 10 MILLIGRAM(S): 4 TABLET, CHEWABLE ORAL at 12:14

## 2021-12-06 RX ADMIN — Medication 100 MILLIGRAM(S): at 10:47

## 2021-12-06 RX ADMIN — PANTOPRAZOLE SODIUM 40 MILLIGRAM(S): 20 TABLET, DELAYED RELEASE ORAL at 06:16

## 2021-12-06 RX ADMIN — Medication 1 SPRAY(S): at 06:22

## 2021-12-06 RX ADMIN — Medication 325 MILLIGRAM(S): at 08:44

## 2021-12-06 RX ADMIN — Medication 4 MILLIGRAM(S): at 06:19

## 2021-12-06 RX ADMIN — SODIUM CHLORIDE 4 MILLILITER(S): 9 INJECTION INTRAMUSCULAR; INTRAVENOUS; SUBCUTANEOUS at 06:03

## 2021-12-06 NOTE — PROGRESS NOTE ADULT - REASON FOR ADMISSION
chest discomfort

## 2021-12-06 NOTE — PROGRESS NOTE ADULT - SUBJECTIVE AND OBJECTIVE BOX
PROGRESS NOTE:   Authored by AME Robison PGY1 Pager: [phone]    Patient is a 71y old  Female who presents with a chief complaint of chest discomfort (06 Dec 2021 05:31)      SUBJECTIVE / OVERNIGHT EVENTS:    ADDITIONAL REVIEW OF SYSTEMS:    MEDICATIONS  (STANDING):  ALBUTerol    90 MICROgram(s) HFA Inhaler 2 Puff(s) Inhalation every 6 hours  apixaban 5 milliGRAM(s) Oral every 12 hours  budesonide 160 MICROgram(s)/formoterol 4.5 MICROgram(s) Inhaler 2 Puff(s) Inhalation two times a day  cefepime   IVPB 1000 milliGRAM(s) IV Intermittent every 8 hours  cefepime   IVPB      chlorhexidine 4% Liquid 1 Application(s) Topical <User Schedule>  dextrose 40% Gel 15 Gram(s) Oral once  dextrose 5%. 1000 milliLiter(s) (50 mL/Hr) IV Continuous <Continuous>  dextrose 5%. 1000 milliLiter(s) (100 mL/Hr) IV Continuous <Continuous>  dextrose 5%. 1000 milliLiter(s) (100 mL/Hr) IV Continuous <Continuous>  dextrose 50% Injectable 25 Gram(s) IV Push once  dextrose 50% Injectable 12.5 Gram(s) IV Push once  dextrose 50% Injectable 25 Gram(s) IV Push once  diltiazem    Tablet 60 milliGRAM(s) Oral every 12 hours  ferrous    sulfate 325 milliGRAM(s) Oral <User Schedule>  glucagon  Injectable 1 milliGRAM(s) IntraMuscular once  glucagon  Injectable 1 milliGRAM(s) IntraMuscular once  guaiFENesin ER 1200 milliGRAM(s) Oral every 12 hours  insulin glargine Injectable (LANTUS) 12 Unit(s) SubCutaneous at bedtime  insulin lispro (ADMELOG) corrective regimen sliding scale   SubCutaneous three times a day before meals  insulin lispro (ADMELOG) corrective regimen sliding scale   SubCutaneous at bedtime  insulin lispro Injectable (ADMELOG) 2 Unit(s) SubCutaneous three times a day before meals  methylPREDNISolone 4 milliGRAM(s) Oral daily  mexiletine 200 milliGRAM(s) Oral three times a day  montelukast 10 milliGRAM(s) Oral daily  pantoprazole    Tablet 40 milliGRAM(s) Oral before breakfast  petrolatum white Ointment 1 Application(s) Topical three times a day  polyethylene glycol 3350 17 Gram(s) Oral daily  senna 2 Tablet(s) Oral at bedtime  sodium chloride 0.65% Nasal 1 Spray(s) Both Nostrils two times a day  sodium chloride 7% Inhalation 4 milliLiter(s) Inhalation two times a day  tiotropium 18 MICROgram(s) Capsule 1 Capsule(s) Inhalation daily    MEDICATIONS  (PRN):  acetaminophen     Tablet .. 650 milliGRAM(s) Oral every 6 hours PRN Temp greater or equal to 38C (100.4F), Mild Pain (1 - 3)  aluminum hydroxide/magnesium hydroxide/simethicone Suspension 30 milliLiter(s) Oral every 4 hours PRN Dyspepsia  benzonatate 100 milliGRAM(s) Oral three times a day PRN Cough  bisacodyl 5 milliGRAM(s) Oral every 12 hours PRN Constipation  melatonin 3 milliGRAM(s) Oral at bedtime PRN Insomnia  metoclopramide Injectable 10 milliGRAM(s) IV Push every 8 hours PRN nausea      CAPILLARY BLOOD GLUCOSE      POCT Blood Glucose.: 165 mg/dL (05 Dec 2021 21:45)  POCT Blood Glucose.: 180 mg/dL (05 Dec 2021 17:57)  POCT Blood Glucose.: 315 mg/dL (05 Dec 2021 13:11)  POCT Blood Glucose.: 172 mg/dL (05 Dec 2021 09:15)    I&O's Summary    05 Dec 2021 07:01  -  06 Dec 2021 07:00  --------------------------------------------------------  IN: 0 mL / OUT: 600 mL / NET: -600 mL        Vital Signs Last 24 Hrs  T(C): 36.9 (06 Dec 2021 06:47), Max: 36.9 (05 Dec 2021 21:10)  T(F): 98.4 (06 Dec 2021 06:47), Max: 98.4 (05 Dec 2021 21:10)  HR: 71 (06 Dec 2021 06:47) (67 - 85)  BP: 136/61 (06 Dec 2021 06:47) (136/61 - 152/80)  BP(mean): --  RR: 18 (06 Dec 2021 06:47) (18 - 18)  SpO2: 96% (06 Dec 2021 06:47) (96% - 98%)    CONSTITUTIONAL: NAD, well-developed, well-groomed  EYES: PERRLA; conjunctiva and sclera clear  ENMT: Moist oral mucosa, no pharyngeal injection or exudates; normal dentition  NECK: Supple, no palpable masses; no thyromegaly  RESPIRATORY: Normal respiratory effort; lungs are clear to auscultation bilaterally  CARDIOVASCULAR: Regular rate and rhythm, normal S1 and S2, no murmur/rub/gallop; No lower extremity edema; Peripheral pulses are 2+ bilaterally  ABDOMEN: Nontender to palpation, normoactive bowel sounds, no rebound/guarding; No hepatosplenomegaly  MUSCULOSKELETAL:  Normal gait; no clubbing or cyanosis of digits; no joint swelling or tenderness to palpation  PSYCH: A+O to person, place, and time; affect appropriate  NEUROLOGY: CN 2-12 are intact and symmetric; no gross sensory deficits   SKIN: No rashes; no palpable lesions  PHYSICAL EXAM:    LABS:                        11.3   9.02  )-----------( 317      ( 05 Dec 2021 07:15 )             37.2     12-05    139  |  100  |  16  ----------------------------<  170<H>  4.0   |  28  |  0.67    Ca    8.9      05 Dec 2021 07:16  Phos  2.9     12-05  Mg     2.1     12-05    TPro  6.3  /  Alb  3.3  /  TBili  0.1<L>  /  DBili  x   /  AST  13  /  ALT  13  /  AlkPhos  86  12-05              Culture - Acid Fast - Sputum w/Smear (collected 03 Dec 2021 10:07)  Source: .Sputum Sputum  Preliminary Report (04 Dec 2021 15:04):    Culture is being performed.        RADIOLOGY & ADDITIONAL TESTS:  Results Reviewed:   Imaging Personally Reviewed:  Electrocardiogram Personally Reviewed:    COORDINATION OF CARE:  Care Discussed with Consultants/Other Providers [Y/N]:  Prior or Outpatient Records Reviewed [Y/N]:   PROGRESS NOTE:   Authored by AME Robison PGY1 Pager: [phone]    Patient is a 71y old  Female who presents with a chief complaint of chest discomfort (06 Dec 2021 05:31)      SUBJECTIVE / OVERNIGHT EVENTS:  NAEO. Patient reports improved cough. No sputum production. Denies N/F/V/C, CP, abdominal pain.     MEDICATIONS  (STANDING):  ALBUTerol    90 MICROgram(s) HFA Inhaler 2 Puff(s) Inhalation every 6 hours  apixaban 5 milliGRAM(s) Oral every 12 hours  budesonide 160 MICROgram(s)/formoterol 4.5 MICROgram(s) Inhaler 2 Puff(s) Inhalation two times a day  cefepime   IVPB 1000 milliGRAM(s) IV Intermittent every 8 hours  cefepime   IVPB      chlorhexidine 4% Liquid 1 Application(s) Topical <User Schedule>  dextrose 40% Gel 15 Gram(s) Oral once  dextrose 5%. 1000 milliLiter(s) (50 mL/Hr) IV Continuous <Continuous>  dextrose 5%. 1000 milliLiter(s) (100 mL/Hr) IV Continuous <Continuous>  dextrose 5%. 1000 milliLiter(s) (100 mL/Hr) IV Continuous <Continuous>  dextrose 50% Injectable 25 Gram(s) IV Push once  dextrose 50% Injectable 12.5 Gram(s) IV Push once  dextrose 50% Injectable 25 Gram(s) IV Push once  diltiazem    Tablet 60 milliGRAM(s) Oral every 12 hours  ferrous    sulfate 325 milliGRAM(s) Oral <User Schedule>  glucagon  Injectable 1 milliGRAM(s) IntraMuscular once  glucagon  Injectable 1 milliGRAM(s) IntraMuscular once  guaiFENesin ER 1200 milliGRAM(s) Oral every 12 hours  insulin glargine Injectable (LANTUS) 12 Unit(s) SubCutaneous at bedtime  insulin lispro (ADMELOG) corrective regimen sliding scale   SubCutaneous three times a day before meals  insulin lispro (ADMELOG) corrective regimen sliding scale   SubCutaneous at bedtime  insulin lispro Injectable (ADMELOG) 2 Unit(s) SubCutaneous three times a day before meals  methylPREDNISolone 4 milliGRAM(s) Oral daily  mexiletine 200 milliGRAM(s) Oral three times a day  montelukast 10 milliGRAM(s) Oral daily  pantoprazole    Tablet 40 milliGRAM(s) Oral before breakfast  petrolatum white Ointment 1 Application(s) Topical three times a day  polyethylene glycol 3350 17 Gram(s) Oral daily  senna 2 Tablet(s) Oral at bedtime  sodium chloride 0.65% Nasal 1 Spray(s) Both Nostrils two times a day  sodium chloride 7% Inhalation 4 milliLiter(s) Inhalation two times a day  tiotropium 18 MICROgram(s) Capsule 1 Capsule(s) Inhalation daily    MEDICATIONS  (PRN):  acetaminophen     Tablet .. 650 milliGRAM(s) Oral every 6 hours PRN Temp greater or equal to 38C (100.4F), Mild Pain (1 - 3)  aluminum hydroxide/magnesium hydroxide/simethicone Suspension 30 milliLiter(s) Oral every 4 hours PRN Dyspepsia  benzonatate 100 milliGRAM(s) Oral three times a day PRN Cough  bisacodyl 5 milliGRAM(s) Oral every 12 hours PRN Constipation  melatonin 3 milliGRAM(s) Oral at bedtime PRN Insomnia  metoclopramide Injectable 10 milliGRAM(s) IV Push every 8 hours PRN nausea      CAPILLARY BLOOD GLUCOSE      POCT Blood Glucose.: 165 mg/dL (05 Dec 2021 21:45)  POCT Blood Glucose.: 180 mg/dL (05 Dec 2021 17:57)  POCT Blood Glucose.: 315 mg/dL (05 Dec 2021 13:11)  POCT Blood Glucose.: 172 mg/dL (05 Dec 2021 09:15)    I&O's Summary    05 Dec 2021 07:01  -  06 Dec 2021 07:00  --------------------------------------------------------  IN: 0 mL / OUT: 600 mL / NET: -600 mL        Vital Signs Last 24 Hrs  T(C): 36.9 (06 Dec 2021 06:47), Max: 36.9 (05 Dec 2021 21:10)  T(F): 98.4 (06 Dec 2021 06:47), Max: 98.4 (05 Dec 2021 21:10)  HR: 71 (06 Dec 2021 06:47) (67 - 85)  BP: 136/61 (06 Dec 2021 06:47) (136/61 - 152/80)  BP(mean): --  RR: 18 (06 Dec 2021 06:47) (18 - 18)  SpO2: 96% (06 Dec 2021 06:47) (96% - 98%)    GENERAL: NAD, lying in bed comfortably  HEAD:  Atraumatic, Normocephalic  EYES:  R eye injected, L eye false eye  CHEST/LUNG: Decreased breath sounds b/l.  Cough on deep inspiration.    HEART: Regular rate and rhythm; No murmurs, rubs, or gallops, normal S1/S2  ABDOMEN: normal bowel sounds; Soft, nontender, nondistended, no organomegaly.  + colostomy   EXTREMITIES:  2+ Peripheral Pulses, brisk capillary refill. No clubbing, cyanosis, or edema  MSK: No gross deformities noted       LABS:                        11.3   9.02  )-----------( 317      ( 05 Dec 2021 07:15 )             37.2     12-05    139  |  100  |  16  ----------------------------<  170<H>  4.0   |  28  |  0.67    Ca    8.9      05 Dec 2021 07:16  Phos  2.9     12-05  Mg     2.1     12-05    TPro  6.3  /  Alb  3.3  /  TBili  0.1<L>  /  DBili  x   /  AST  13  /  ALT  13  /  AlkPhos  86  12-05              Culture - Acid Fast - Sputum w/Smear (collected 03 Dec 2021 10:07)  Source: .Sputum Sputum  Preliminary Report (04 Dec 2021 15:04):    Culture is being performed.        RADIOLOGY & ADDITIONAL TESTS:  Results Reviewed:   Imaging Personally Reviewed:  Electrocardiogram Personally Reviewed:    COORDINATION OF CARE:  Care Discussed with Consultants/Other Providers [Y/N]:  Prior or Outpatient Records Reviewed [Y/N]:

## 2021-12-06 NOTE — PROGRESS NOTE ADULT - PROBLEM SELECTOR PLAN 6
#prophylactic measure  DVT ppx- c/w eliquis  Diet regular #prophylactic measure  DVT ppx- c/w eliquis  Diet regular  Dispo: Discharge to home today

## 2021-12-06 NOTE — DISCHARGE NOTE NURSING/CASE MANAGEMENT/SOCIAL WORK - NSDCPEFALRISK_GEN_ALL_CORE
For information on Fall & Injury Prevention, visit: [email or web address] OR  [email or web address] OR  https://www.cdc.gov/steadi/patient.html

## 2021-12-06 NOTE — DISCHARGE NOTE NURSING/CASE MANAGEMENT/SOCIAL WORK - PATIENT PORTAL LINK FT
You can access the FollowMyHealth Patient Portal offered by [facility] by registering at the following website: [email or web address]. By joining [facility]’s FollowMyHealth portal, you will also be able to view your health information using other applications (apps) compatible with our system.

## 2021-12-06 NOTE — PROGRESS NOTE ADULT - ASSESSMENT
70 y/o F w/tracheobronchomalacia s/p tracheobronchoplasty, severe persistent asthma, bronchiectasis, and colon cancer s/p colectomy admitted with likely exacerbation of bronchiectasis due to pneumonia.    - Supplemental O2 as needed goal O2 sat >= 90%  - Broad spectrum abx including pseudomonal coverage  - Airway clearance, acapella device, bronchodilators, chest PT  - Continue home asthma regimen  - Repeat CT scan in 3 months for follow up of new pulmonary nodule  ***************************************  11/30-better over all                                                                                    SEE Below:  12/1-better but still sx, ID evaln-await sputum results  12/2-better over all-less sx  12/3-still improving-completing ABX  72 y/o F w/tracheobronchomalacia s/p tracheobronchoplasty, severe persistent asthma, bronchiectasis, and colon cancer s/p colectomy admitted with likely exacerbation of bronchiectasis due to pneumonia.    - Supplemental O2 as needed goal O2 sat >= 90%  - Broad spectrum abx including pseudomonal coverage  - Airway clearance, acapella device, bronchodilators, chest PT  - Continue home asthma regimen  - Repeat CT scan in 3 months for follow up of new pulmonary nodule  ***************************************  11/30-better over all                                                                                    SEE Below:  12/1-better but still sx, ID evaln-await sputum results  12/2-better over all-less sx  12/3-still improving-completing ABX  12/6-better-"ok for DC"

## 2021-12-06 NOTE — PROGRESS NOTE ADULT - SUBJECTIVE AND OBJECTIVE BOX
CHIEF COMPLAINT: f/up TBM, bronchiectasis, severe persistent asthma, CB, PNA-    Interval Events: completed ABX on 12/4--not sure why pt not DC to home?????    REVIEW OF SYSTEMS:  Constitutional: No fevers or chills. No weight loss. No fatigue or generalized malaise.  Eyes: No itching or discharge from the eyes  ENT: No ear pain. No ear discharge. No nasal congestion. No post nasal drip. No epistaxis. No throat pain. No sore throat. No difficulty swallowing.   CV: No chest pain. No palpitations. No lightheadedness or dizziness.   Resp: No dyspnea at rest. No dyspnea on exertion. No orthopnea. No wheezing. No cough. No stridor. No sputum production. No chest pain with respiration.  GI: No nausea. No vomiting. No diarrhea.  MSK: No joint pain or pain in any extremities  Integumentary: No skin lesions. No pedal edema.  Neurological: No gross motor weakness. No sensory changes.  [ ] All other systems negative  [ ] Unable to assess ROS because ________    OBJECTIVE:  ICU Vital Signs Last 24 Hrs  T(C): 36.9 (05 Dec 2021 21:10), Max: 36.9 (05 Dec 2021 21:10)  T(F): 98.4 (05 Dec 2021 21:10), Max: 98.4 (05 Dec 2021 21:10)  HR: 67 (05 Dec 2021 21:10) (67 - 81)  BP: 152/80 (05 Dec 2021 21:10) (136/66 - 158/63)  BP(mean): --  ABP: --  ABP(mean): --  RR: 18 (05 Dec 2021 21:10) (18 - 18)  SpO2: 96% (05 Dec 2021 21:10) (96% - 98%)        12-04 @ 07:01  -  12-05 @ 07:00  --------------------------------------------------------  IN: 0 mL / OUT: 1250 mL / NET: -1250 mL      CAPILLARY BLOOD GLUCOSE      POCT Blood Glucose.: 165 mg/dL (05 Dec 2021 21:45)      PHYSICAL EXAM:  General: Awake, alert, oriented X 3.   HEENT: Atraumatic, normocephalic.                 Mallampatti Grade                 No nasal congestion.                No tonsillar or pharyngeal exudates.  Lymph Nodes: No palpable lymphadenopathy  Neck: No JVD. No carotid bruit.   Respiratory: Normal chest expansion                         Normal percussion                         Normal and equal air entry                         No wheeze, rhonchi or rales.  Cardiovascular: S1 S2 normal. No murmurs, rubs or gallops.   Abdomen: Soft, non-tender, non-distended. No organomegaly. Normoactive bowel sounds.  Extremities: Warm to touch. Peripheral pulse palpable. No pedal edema.   Skin: No rashes or skin lesions  Neurological: Motor and sensory examination equal and normal in all four extremities.  Psychiatry: Appropriate mood and affect.    HOSPITAL MEDICATIONS:  MEDICATIONS  (STANDING):  ALBUTerol    90 MICROgram(s) HFA Inhaler 2 Puff(s) Inhalation every 6 hours  apixaban 5 milliGRAM(s) Oral every 12 hours  budesonide 160 MICROgram(s)/formoterol 4.5 MICROgram(s) Inhaler 2 Puff(s) Inhalation two times a day  cefepime   IVPB 1000 milliGRAM(s) IV Intermittent every 8 hours  cefepime   IVPB      chlorhexidine 4% Liquid 1 Application(s) Topical <User Schedule>  dextrose 40% Gel 15 Gram(s) Oral once  dextrose 5%. 1000 milliLiter(s) (50 mL/Hr) IV Continuous <Continuous>  dextrose 5%. 1000 milliLiter(s) (100 mL/Hr) IV Continuous <Continuous>  dextrose 5%. 1000 milliLiter(s) (100 mL/Hr) IV Continuous <Continuous>  dextrose 50% Injectable 25 Gram(s) IV Push once  dextrose 50% Injectable 12.5 Gram(s) IV Push once  dextrose 50% Injectable 25 Gram(s) IV Push once  diltiazem    Tablet 60 milliGRAM(s) Oral every 12 hours  ferrous    sulfate 325 milliGRAM(s) Oral <User Schedule>  glucagon  Injectable 1 milliGRAM(s) IntraMuscular once  glucagon  Injectable 1 milliGRAM(s) IntraMuscular once  guaiFENesin ER 1200 milliGRAM(s) Oral every 12 hours  insulin glargine Injectable (LANTUS) 12 Unit(s) SubCutaneous at bedtime  insulin lispro (ADMELOG) corrective regimen sliding scale   SubCutaneous three times a day before meals  insulin lispro (ADMELOG) corrective regimen sliding scale   SubCutaneous at bedtime  insulin lispro Injectable (ADMELOG) 2 Unit(s) SubCutaneous three times a day before meals  magnesium citrate Oral Solution 1 Bottle Oral once  methylPREDNISolone 4 milliGRAM(s) Oral daily  mexiletine 200 milliGRAM(s) Oral three times a day  montelukast 10 milliGRAM(s) Oral daily  mupirocin 2% Nasal 1 Application(s) Nasal two times a day  pantoprazole    Tablet 40 milliGRAM(s) Oral before breakfast  petrolatum white Ointment 1 Application(s) Topical three times a day  polyethylene glycol 3350 17 Gram(s) Oral daily  senna 2 Tablet(s) Oral at bedtime  sodium chloride 0.65% Nasal 1 Spray(s) Both Nostrils two times a day  sodium chloride 7% Inhalation 4 milliLiter(s) Inhalation two times a day  tiotropium 18 MICROgram(s) Capsule 1 Capsule(s) Inhalation daily    MEDICATIONS  (PRN):  acetaminophen     Tablet .. 650 milliGRAM(s) Oral every 6 hours PRN Temp greater or equal to 38C (100.4F), Mild Pain (1 - 3)  aluminum hydroxide/magnesium hydroxide/simethicone Suspension 30 milliLiter(s) Oral every 4 hours PRN Dyspepsia  benzonatate 100 milliGRAM(s) Oral three times a day PRN Cough  bisacodyl 5 milliGRAM(s) Oral every 12 hours PRN Constipation  melatonin 3 milliGRAM(s) Oral at bedtime PRN Insomnia  metoclopramide Injectable 10 milliGRAM(s) IV Push every 8 hours PRN nausea      LABS:                        11.3   9.02  )-----------( 317      ( 05 Dec 2021 07:15 )             37.2     12-05    139  |  100  |  16  ----------------------------<  170<H>  4.0   |  28  |  0.67    Ca    8.9      05 Dec 2021 07:16  Phos  2.9     12-05  Mg     2.1     12-05    TPro  6.3  /  Alb  3.3  /  TBili  0.1<L>  /  DBili  x   /  AST  13  /  ALT  13  /  AlkPhos  86  12-05              MICROBIOLOGY:     RADIOLOGY:  [ ] Reviewed and interpreted by me    Point of Care Ultrasound Findings:    PFT:    EKG: CHIEF COMPLAINT: f/up TBM, bronchiectasis, severe persistent asthma, CB, PNA-better over all--less cough and productive    Interval Events: completed ABX on 12/4--not sure why pt not DC to home?????    REVIEW OF SYSTEMS:  Constitutional: No fevers or chills. No weight loss. No fatigue or generalized malaise.  Eyes: No itching or discharge from the eyes  ENT: No ear pain. No ear discharge. No nasal congestion. No post nasal drip. No epistaxis. No throat pain. No sore throat. No difficulty swallowing.   CV: No chest pain. No palpitations. No lightheadedness or dizziness.   Resp: No dyspnea at rest. + dyspnea on exertion. No orthopnea. No wheezing. No cough. No stridor. No sputum production. No chest pain with respiration.  GI: No nausea. No vomiting. No diarrhea.  MSK: No joint pain or pain in any extremities  Integumentary: No skin lesions. No pedal edema.  Neurological: No gross motor weakness. No sensory changes.  [+ ] All other systems negative  [ ] Unable to assess ROS because ________    OBJECTIVE:  ICU Vital Signs Last 24 Hrs  T(C): 36.9 (05 Dec 2021 21:10), Max: 36.9 (05 Dec 2021 21:10)  T(F): 98.4 (05 Dec 2021 21:10), Max: 98.4 (05 Dec 2021 21:10)  HR: 67 (05 Dec 2021 21:10) (67 - 81)  BP: 152/80 (05 Dec 2021 21:10) (136/66 - 158/63)  BP(mean): --  ABP: --  ABP(mean): --  RR: 18 (05 Dec 2021 21:10) (18 - 18)  SpO2: 96% (05 Dec 2021 21:10) (96% - 98%)        12-04 @ 07:01  -  12-05 @ 07:00  --------------------------------------------------------  IN: 0 mL / OUT: 1250 mL / NET: -1250 mL      CAPILLARY BLOOD GLUCOSE      POCT Blood Glucose.: 165 mg/dL (05 Dec 2021 21:45)      PHYSICAL EXAM: NAD in bed on RA  General: Awake, alert, oriented X 3.   HEENT: Atraumatic, normocephalic.                 Mallampatti Grade 2                No nasal congestion.                No tonsillar or pharyngeal exudates.  Lymph Nodes: No palpable lymphadenopathy  Neck: No JVD. No carotid bruit.   Respiratory: Normal chest expansion                         Normal percussion                         Normal and equal air entry                         No wheeze, rhonchi or rales.  Cardiovascular: S1 S2 normal. No murmurs, rubs or gallops.   Abdomen: Soft, non-tender, non-distended. No organomegaly. Normoactive bowel sounds.  Extremities: Warm to touch. Peripheral pulse palpable. No pedal edema.   Skin: No rashes or skin lesions  Neurological: Motor and sensory examination equal and normal in all four extremities.  Psychiatry: Appropriate mood and affect.    HOSPITAL MEDICATIONS:  MEDICATIONS  (STANDING):  ALBUTerol    90 MICROgram(s) HFA Inhaler 2 Puff(s) Inhalation every 6 hours  apixaban 5 milliGRAM(s) Oral every 12 hours  budesonide 160 MICROgram(s)/formoterol 4.5 MICROgram(s) Inhaler 2 Puff(s) Inhalation two times a day  cefepime   IVPB 1000 milliGRAM(s) IV Intermittent every 8 hours  cefepime   IVPB      chlorhexidine 4% Liquid 1 Application(s) Topical <User Schedule>  dextrose 40% Gel 15 Gram(s) Oral once  dextrose 5%. 1000 milliLiter(s) (50 mL/Hr) IV Continuous <Continuous>  dextrose 5%. 1000 milliLiter(s) (100 mL/Hr) IV Continuous <Continuous>  dextrose 5%. 1000 milliLiter(s) (100 mL/Hr) IV Continuous <Continuous>  dextrose 50% Injectable 25 Gram(s) IV Push once  dextrose 50% Injectable 12.5 Gram(s) IV Push once  dextrose 50% Injectable 25 Gram(s) IV Push once  diltiazem    Tablet 60 milliGRAM(s) Oral every 12 hours  ferrous    sulfate 325 milliGRAM(s) Oral <User Schedule>  glucagon  Injectable 1 milliGRAM(s) IntraMuscular once  glucagon  Injectable 1 milliGRAM(s) IntraMuscular once  guaiFENesin ER 1200 milliGRAM(s) Oral every 12 hours  insulin glargine Injectable (LANTUS) 12 Unit(s) SubCutaneous at bedtime  insulin lispro (ADMELOG) corrective regimen sliding scale   SubCutaneous three times a day before meals  insulin lispro (ADMELOG) corrective regimen sliding scale   SubCutaneous at bedtime  insulin lispro Injectable (ADMELOG) 2 Unit(s) SubCutaneous three times a day before meals  magnesium citrate Oral Solution 1 Bottle Oral once  methylPREDNISolone 4 milliGRAM(s) Oral daily  mexiletine 200 milliGRAM(s) Oral three times a day  montelukast 10 milliGRAM(s) Oral daily  mupirocin 2% Nasal 1 Application(s) Nasal two times a day  pantoprazole    Tablet 40 milliGRAM(s) Oral before breakfast  petrolatum white Ointment 1 Application(s) Topical three times a day  polyethylene glycol 3350 17 Gram(s) Oral daily  senna 2 Tablet(s) Oral at bedtime  sodium chloride 0.65% Nasal 1 Spray(s) Both Nostrils two times a day  sodium chloride 7% Inhalation 4 milliLiter(s) Inhalation two times a day  tiotropium 18 MICROgram(s) Capsule 1 Capsule(s) Inhalation daily    MEDICATIONS  (PRN):  acetaminophen     Tablet .. 650 milliGRAM(s) Oral every 6 hours PRN Temp greater or equal to 38C (100.4F), Mild Pain (1 - 3)  aluminum hydroxide/magnesium hydroxide/simethicone Suspension 30 milliLiter(s) Oral every 4 hours PRN Dyspepsia  benzonatate 100 milliGRAM(s) Oral three times a day PRN Cough  bisacodyl 5 milliGRAM(s) Oral every 12 hours PRN Constipation  melatonin 3 milliGRAM(s) Oral at bedtime PRN Insomnia  metoclopramide Injectable 10 milliGRAM(s) IV Push every 8 hours PRN nausea      LABS:                        11.3   9.02  )-----------( 317      ( 05 Dec 2021 07:15 )             37.2     12-05    139  |  100  |  16  ----------------------------<  170<H>  4.0   |  28  |  0.67    Ca    8.9      05 Dec 2021 07:16  Phos  2.9     12-05  Mg     2.1     12-05    TPro  6.3  /  Alb  3.3  /  TBili  0.1<L>  /  DBili  x   /  AST  13  /  ALT  13  /  AlkPhos  86  12-05              MICROBIOLOGY:     RADIOLOGY:  [ ] Reviewed and interpreted by me    Point of Care Ultrasound Findings:    PFT:    EKG:

## 2021-12-06 NOTE — PROGRESS NOTE ADULT - PROBLEM SELECTOR PLAN 2
#Tracheobronchomalacia s/p tracheobronchoplasty and asthma hx  -pulm called by ED (Dr. Allison), rec's appreciated   -c/w breo elipta, singulair, spiriva
#Tracheobronchomalacia s/p tracheobronchoplasty and asthma hx  -pulm called by ED (Dr. Allison)  -c/w breo elipta, singulair, spiriva
#Tracheobronchomalacia s/p tracheobronchoplasty and asthma hx  -pulm called by ED (Dr. Allison), rec's appreciated   -c/w breo elipta, singulair, spiriva

## 2021-12-06 NOTE — PROGRESS NOTE ADULT - TIME BILLING
as above: slow improvement-ID f/up-await final sputum results to de-esculate rx-? MURF--additional sputum pending (fungal/afb etc)  multifactorial dyspnea-TBM, CB, severe persistent asthma, PNA, debility, anemia, CAD--O2 NC sat above 90%  PNA-f/up sputum cx=MURF--cefepime D6 of ?7  (ID formal Ketan Liz)  WOO-KO-oddggfjt/vest rx, incentive spirometry  asthma-medrol 4mg, spiriva/symbicort, duoneb q 6, singulair 10 q hs,  allergy-claritin/flonase  gerd-ppi  abnormal CT-nodule-f/up 3 months                    cards-on mult rx-to continue  PT-OOB    DVT prophylaxis  DC planning next 24 hrs    Eulalio Allison MD-Pulmonary   [phone] as above:  improved-ID f/up-completed 8 days of a 7 day course of ABX  multifactorial dyspnea-TBM, CB, severe persistent asthma, PNA, debility, anemia, CAD--O2 NC sat above 90%  PNA-f/up sputum cx=MURF--s/p cefepime D8 of ?7  (ID formal karenn-RISHABH Liz)  FBG-CH-mxprlsic/vest rx, incentive spirometry  asthma-medrol 4mg, spiriva/symbicort, duoneb q 6, singulair 10 q hs,  allergy-claritin/flonase  gerd-ppi  abnormal CT-nodule-f/up 3 months                    cards-on mult rx-to continue  PT-OOB    DVT prophylaxis  DC planning today w/ office f/up 2-3 weeks    Eulalio Allison MD-Pulmonary   [phone]

## 2021-12-06 NOTE — PROGRESS NOTE ADULT - PROVIDER SPECIALTY LIST ADULT
Infectious Disease
Pulmonology
Infectious Disease
Internal Medicine
Pulmonology
Pulmonology
Infectious Disease
Pulmonology
Internal Medicine
Pulmonology
Pulmonology
Internal Medicine

## 2021-12-06 NOTE — PROGRESS NOTE ADULT - PROBLEM SELECTOR PLAN 3
# adrenal insuff  c/w methypred 4mg 1 tab daily

## 2021-12-06 NOTE — PROGRESS NOTE ADULT - PROBLEM SELECTOR PROBLEM 2
Tracheobronchomalacia

## 2021-12-06 NOTE — PROGRESS NOTE ADULT - ATTENDING COMMENTS
Acute bronchiectasis exacerbation due to bacterial pneumonia. Completed treatment.     D/c home w f/u. D/c tie 40 mins. Acute bronchiectasis exacerbation due to bacterial pneumonia. Completed treatment.     D/c home w f/u. D/c time 40 mins.

## 2021-12-08 ENCOUNTER — APPOINTMENT (OUTPATIENT)
Dept: UROLOGY | Facility: CLINIC | Age: 71
End: 2021-12-08
Payer: MEDICARE

## 2021-12-08 ENCOUNTER — OUTPATIENT (OUTPATIENT)
Dept: OUTPATIENT SERVICES | Facility: HOSPITAL | Age: 71
LOS: 1 days | End: 2021-12-08
Payer: MEDICARE

## 2021-12-08 VITALS — DIASTOLIC BLOOD PRESSURE: 79 MMHG | RESPIRATION RATE: 16 BRPM | HEART RATE: 76 BPM | SYSTOLIC BLOOD PRESSURE: 176 MMHG

## 2021-12-08 DIAGNOSIS — Z87.448 PERSONAL HISTORY OF OTHER DISEASES OF URINARY SYSTEM: ICD-10-CM

## 2021-12-08 DIAGNOSIS — Z87.09 PERSONAL HISTORY OF OTHER DISEASES OF THE RESPIRATORY SYSTEM: Chronic | ICD-10-CM

## 2021-12-08 DIAGNOSIS — K62.5 HEMORRHAGE OF ANUS AND RECTUM: Chronic | ICD-10-CM

## 2021-12-08 DIAGNOSIS — Z96.653 PRESENCE OF ARTIFICIAL KNEE JOINT, BILATERAL: Chronic | ICD-10-CM

## 2021-12-08 DIAGNOSIS — Z98.89 OTHER SPECIFIED POSTPROCEDURAL STATES: Chronic | ICD-10-CM

## 2021-12-08 DIAGNOSIS — Z98.890 OTHER SPECIFIED POSTPROCEDURAL STATES: Chronic | ICD-10-CM

## 2021-12-08 DIAGNOSIS — R35.0 FREQUENCY OF MICTURITION: ICD-10-CM

## 2021-12-08 DIAGNOSIS — H05.352 EXOSTOSIS OF LEFT ORBIT: Chronic | ICD-10-CM

## 2021-12-08 PROCEDURE — 52000 CYSTOURETHROSCOPY: CPT

## 2021-12-09 ENCOUNTER — NON-APPOINTMENT (OUTPATIENT)
Age: 71
End: 2021-12-09

## 2021-12-09 LAB
A FLAVUS AB FLD QL: NEGATIVE — SIGNIFICANT CHANGE UP
A NIGER AB FLD QL: NEGATIVE — SIGNIFICANT CHANGE UP
A NIGER AB FLD QL: NEGATIVE — SIGNIFICANT CHANGE UP

## 2021-12-10 ENCOUNTER — OUTPATIENT (OUTPATIENT)
Dept: OUTPATIENT SERVICES | Facility: HOSPITAL | Age: 71
LOS: 1 days | Discharge: HOME | End: 2021-12-10

## 2021-12-10 DIAGNOSIS — Z98.89 OTHER SPECIFIED POSTPROCEDURAL STATES: Chronic | ICD-10-CM

## 2021-12-10 DIAGNOSIS — H05.352 EXOSTOSIS OF LEFT ORBIT: Chronic | ICD-10-CM

## 2021-12-10 DIAGNOSIS — Z98.890 OTHER SPECIFIED POSTPROCEDURAL STATES: Chronic | ICD-10-CM

## 2021-12-10 DIAGNOSIS — Z96.653 PRESENCE OF ARTIFICIAL KNEE JOINT, BILATERAL: Chronic | ICD-10-CM

## 2021-12-10 DIAGNOSIS — C18.9 MALIGNANT NEOPLASM OF COLON, UNSPECIFIED: ICD-10-CM

## 2021-12-10 DIAGNOSIS — K62.5 HEMORRHAGE OF ANUS AND RECTUM: Chronic | ICD-10-CM

## 2021-12-10 DIAGNOSIS — Z87.09 PERSONAL HISTORY OF OTHER DISEASES OF THE RESPIRATORY SYSTEM: Chronic | ICD-10-CM

## 2021-12-13 ENCOUNTER — APPOINTMENT (OUTPATIENT)
Dept: PULMONOLOGY | Facility: CLINIC | Age: 71
End: 2021-12-13

## 2021-12-14 ENCOUNTER — APPOINTMENT (OUTPATIENT)
Dept: HEMATOLOGY ONCOLOGY | Facility: CLINIC | Age: 71
End: 2021-12-14
Payer: MEDICARE

## 2021-12-14 VITALS
OXYGEN SATURATION: 97 % | DIASTOLIC BLOOD PRESSURE: 82 MMHG | BODY MASS INDEX: 22.98 KG/M2 | RESPIRATION RATE: 18 BRPM | HEART RATE: 71 BPM | WEIGHT: 146.39 LBS | TEMPERATURE: 98.1 F | HEIGHT: 67.01 IN | SYSTOLIC BLOOD PRESSURE: 170 MMHG

## 2021-12-14 PROCEDURE — 99214 OFFICE O/P EST MOD 30 MIN: CPT

## 2021-12-14 NOTE — REVIEW OF SYSTEMS
[Negative] : Allergic/Immunologic [FreeTextEntry6] : chronic cough - has been better [FreeTextEntry8] : burning [de-identified] : She walks without cane; gait off balance.

## 2021-12-14 NOTE — PHYSICAL EXAM
[Ambulatory and capable of all self care but unable to carry out any work activities] : Status 2- Ambulatory and capable of all self care but unable to carry out any work activities. Up and about more than 50% of waking hours [Thin] : thin [Normal] : affect appropriate [de-identified] : Left eye enucleation [de-identified] : mild wheeze; upper airway sounds transmitted.  [FreeTextEntry1] : Stoma is functioning well. Small opening at anal site, and no discharge.  Small 0.8 cm X 0.8 cm raised lesion proximal to anal canal located ~ 3-4 cm proximal to anal canal. [de-identified] : non-focal

## 2021-12-14 NOTE — HISTORY OF PRESENT ILLNESS
[de-identified] : Patient developed bleeding AUG 2016 from rectum. A colonoscopy in MAR 2016 was unrevealing. It was thought to be a hemorrhoid in the past. Her HGB began to drop. She was examined She called PCP and referred to GI surgeon (Dr. Magallanes). A mass was found and biopsy showed anal cancer, squamous cell. A PET scan did not reveal any local or metastatic disease. She received Mitomycin on 5/3/2017 (dose #2 mitomycin on 6/1/2017) and Xeloda. Radiation 5/3/17 to 6/16/17 at 5400 cGy. \par \par Patient also with h/o adrenal insufficiency after 50 years of steroids for asthma and tracheomalacia (mesh placed 10/2016, Dr. Mcclellan at [facility]). She has diabetes that requires insulin, without sequelae on the kidney or eye. 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 Will continue to monitor. \par Breast screening: She had breast mammogram and US in 2017 found 2 lesions, and both benign. Rt breast - "benign, nonproliferative fibrocystic changes"; Lt Breast - "fibroadenoma".  Due for repeat 2/2018.\par Cardiac: Will be undergoing SAFIA (Dr. Leahy) for re-evaluation of aortic valve, and bronchoscopy (Dr. Mcclellan). Her valve showed moderate to severe AR.\par \par 5/29/2018: Patient has local relapse in anus proved by biopsy in February 2018. It is again SCC and HPV / p16 is positive. PET 1/2018 showed no metastatic disease. Colonoscopy 2/2018 showed no colonic lesion, only anal mass that is palpable by ARIAS. Patient is deemed not a surgical candidate for APR due to pulmonary and cardiac risk. She completed second course of radiotherapy in early 4/2018 with Dr. Amanda Burger. She was recently hospitalized for high fever. No clear source - blood and urine cultures were negative. The CAT scan showed minor opacities that were not consistent with pnuemonia. She was admitted for 5 days and given IV antibiotics empirically. She now has weakness in legs and uses walker for balance. She will be getting a home rehabilitation visit soon. Does feel lightheaded at times.\par \par 8/22/2019:\par 1: Anal Cancer: Patient underwent APR surgery on 7/24/2018. Pathology post-operatively is [id number]; squamous cell cancer. The tissue is healed.  She is due to repeat colonoscopy. \par 2: Lung nodule: She had new lung nodule on right hemidiaphragm that is 1 cm and not FDG avid. She has a second 1 cm nodule on the RML that is subplueral and has mild FDG. Repeat CAT 8/13/19 shows that there is slight improvement of RLL nodule. She saw Dr. Zapien and he agrees to continue to surveillance.\par 3: Cardiac: Undergoing evaluation for possible pulmonary HTN. She also has aortic regurgitation. She has baseline COPD.\par 4: Social: She lives with her sister. She is doing volunteer work, and helps in soup kitchen.\par 5: Pulmonary - using nebulizers daily and less cough.\par \par 8/26/2020:\par 1) Anal CA: She recently underwent colonoscopy on 8/13/2020 with Dr. Hall which demonstrated a polyp in the transverse colon.   Pathology demonstrated tubular adenoma.\par She is here in the office today because concerns of a new lesion located on anus which is ~ 0.8 x 0.8 cm.\par She denies fever, chills, abdominal pain, change in bowel habits, weight loss or increase in fatigue.\par Patient has follow up with Dr. Luong next week.\par 2) Pulmonary: Continues to see Dr. Allison for tracheomalacia. \par 3) Social: Patient currently lives at home with her sister.\par \par 7/2/21\par PAtient has recently developed PVC and bigeminy. Started on mexilitine\par Had recent Shingles. Will need vaccine in the Fall with Shingrix.\par HAs not had imaging for anal cancer since 8/2020 - needs followup. Also, is allergic to contrast.\par \par 12/14/21\par Recent admission for pneumonia.\par I reviewed old and recent CT scan. \par Definitely new infiltrate.\par She is doing better after antibiotics.\par Needs continued surveillance for cancer relapse with CT scan - next due 2/2022.\par Last GI scope 2021 with Dr. Luong, and due to return in 2022. [de-identified] : Pulmonary: Eulalio Allison  [phone]\par GI surgeon: Terrell Luong; [phone]\par PCP: Anastasiya Jin [phone] \par Cardiologist: Steven Leahy [phone]\par Radiation Oncology: Amanda Burger and Allie Mart\par Colonoscopy: Rafael\par Wound: Huma Gray [phone]\par \par [facility] doctors: \par PCP/Cardiology: Jordi Engel\par Thoracic Surgeon: Zohaib Zapien

## 2021-12-20 ENCOUNTER — NON-APPOINTMENT (OUTPATIENT)
Age: 71
End: 2021-12-20

## 2021-12-20 ENCOUNTER — OUTPATIENT (OUTPATIENT)
Dept: OUTPATIENT SERVICES | Facility: HOSPITAL | Age: 71
LOS: 1 days | End: 2021-12-20
Payer: MEDICARE

## 2021-12-20 ENCOUNTER — APPOINTMENT (OUTPATIENT)
Dept: RADIOLOGY | Facility: IMAGING CENTER | Age: 71
End: 2021-12-20
Payer: MEDICARE

## 2021-12-20 DIAGNOSIS — Z98.890 OTHER SPECIFIED POSTPROCEDURAL STATES: Chronic | ICD-10-CM

## 2021-12-20 DIAGNOSIS — Z98.89 OTHER SPECIFIED POSTPROCEDURAL STATES: Chronic | ICD-10-CM

## 2021-12-20 DIAGNOSIS — H05.352 EXOSTOSIS OF LEFT ORBIT: Chronic | ICD-10-CM

## 2021-12-20 DIAGNOSIS — R05.9 COUGH, UNSPECIFIED: ICD-10-CM

## 2021-12-20 DIAGNOSIS — Z96.653 PRESENCE OF ARTIFICIAL KNEE JOINT, BILATERAL: Chronic | ICD-10-CM

## 2021-12-20 DIAGNOSIS — K62.5 HEMORRHAGE OF ANUS AND RECTUM: Chronic | ICD-10-CM

## 2021-12-20 DIAGNOSIS — Z87.09 PERSONAL HISTORY OF OTHER DISEASES OF THE RESPIRATORY SYSTEM: Chronic | ICD-10-CM

## 2021-12-20 PROCEDURE — 71046 X-RAY EXAM CHEST 2 VIEWS: CPT

## 2021-12-20 PROCEDURE — 71046 X-RAY EXAM CHEST 2 VIEWS: CPT | Mod: 26

## 2021-12-21 ENCOUNTER — NON-APPOINTMENT (OUTPATIENT)
Age: 71
End: 2021-12-21

## 2021-12-21 ENCOUNTER — APPOINTMENT (OUTPATIENT)
Dept: PULMONOLOGY | Facility: CLINIC | Age: 71
End: 2021-12-21

## 2021-12-22 PROCEDURE — 84466 ASSAY OF TRANSFERRIN: CPT

## 2021-12-22 PROCEDURE — 87116 MYCOBACTERIA CULTURE: CPT

## 2021-12-22 PROCEDURE — 82330 ASSAY OF CALCIUM: CPT

## 2021-12-22 PROCEDURE — 87449 NOS EACH ORGANISM AG IA: CPT

## 2021-12-22 PROCEDURE — 82553 CREATINE MB FRACTION: CPT

## 2021-12-22 PROCEDURE — 83735 ASSAY OF MAGNESIUM: CPT

## 2021-12-22 PROCEDURE — 71250 CT THORAX DX C-: CPT

## 2021-12-22 PROCEDURE — 87641 MR-STAPH DNA AMP PROBE: CPT

## 2021-12-22 PROCEDURE — 83880 ASSAY OF NATRIURETIC PEPTIDE: CPT

## 2021-12-22 PROCEDURE — U0005: CPT

## 2021-12-22 PROCEDURE — 85025 COMPLETE CBC W/AUTO DIFF WBC: CPT

## 2021-12-22 PROCEDURE — 36415 COLL VENOUS BLD VENIPUNCTURE: CPT

## 2021-12-22 PROCEDURE — 87206 SMEAR FLUORESCENT/ACID STAI: CPT

## 2021-12-22 PROCEDURE — 96365 THER/PROPH/DIAG IV INF INIT: CPT

## 2021-12-22 PROCEDURE — 81001 URINALYSIS AUTO W/SCOPE: CPT

## 2021-12-22 PROCEDURE — 71045 X-RAY EXAM CHEST 1 VIEW: CPT

## 2021-12-22 PROCEDURE — 87070 CULTURE OTHR SPECIMN AEROBIC: CPT

## 2021-12-22 PROCEDURE — 86769 SARS-COV-2 COVID-19 ANTIBODY: CPT

## 2021-12-22 PROCEDURE — 84295 ASSAY OF SERUM SODIUM: CPT

## 2021-12-22 PROCEDURE — 82435 ASSAY OF BLOOD CHLORIDE: CPT

## 2021-12-22 PROCEDURE — 83036 HEMOGLOBIN GLYCOSYLATED A1C: CPT

## 2021-12-22 PROCEDURE — 82803 BLOOD GASES ANY COMBINATION: CPT

## 2021-12-22 PROCEDURE — 87899 AGENT NOS ASSAY W/OPTIC: CPT

## 2021-12-22 PROCEDURE — 99285 EMERGENCY DEPT VISIT HI MDM: CPT | Mod: 25

## 2021-12-22 PROCEDURE — 84484 ASSAY OF TROPONIN QUANT: CPT

## 2021-12-22 PROCEDURE — 82947 ASSAY GLUCOSE BLOOD QUANT: CPT

## 2021-12-22 PROCEDURE — 85018 HEMOGLOBIN: CPT

## 2021-12-22 PROCEDURE — 84145 PROCALCITONIN (PCT): CPT

## 2021-12-22 PROCEDURE — 84100 ASSAY OF PHOSPHORUS: CPT

## 2021-12-22 PROCEDURE — 80053 COMPREHEN METABOLIC PANEL: CPT

## 2021-12-22 PROCEDURE — 84443 ASSAY THYROID STIM HORMONE: CPT

## 2021-12-22 PROCEDURE — 87040 BLOOD CULTURE FOR BACTERIA: CPT

## 2021-12-22 PROCEDURE — 83605 ASSAY OF LACTIC ACID: CPT

## 2021-12-22 PROCEDURE — 94640 AIRWAY INHALATION TREATMENT: CPT

## 2021-12-22 PROCEDURE — 86606 ASPERGILLUS ANTIBODY: CPT

## 2021-12-22 PROCEDURE — 82962 GLUCOSE BLOOD TEST: CPT

## 2021-12-22 PROCEDURE — U0003: CPT

## 2021-12-22 PROCEDURE — 97161 PT EVAL LOW COMPLEX 20 MIN: CPT

## 2021-12-22 PROCEDURE — 83540 ASSAY OF IRON: CPT

## 2021-12-22 PROCEDURE — 87102 FUNGUS ISOLATION CULTURE: CPT

## 2021-12-22 PROCEDURE — 87015 SPECIMEN INFECT AGNT CONCNTJ: CPT

## 2021-12-22 PROCEDURE — 83550 IRON BINDING TEST: CPT

## 2021-12-22 PROCEDURE — 82728 ASSAY OF FERRITIN: CPT

## 2021-12-22 PROCEDURE — 93005 ELECTROCARDIOGRAM TRACING: CPT

## 2021-12-22 PROCEDURE — 84132 ASSAY OF SERUM POTASSIUM: CPT

## 2021-12-22 PROCEDURE — 82550 ASSAY OF CK (CPK): CPT

## 2021-12-22 PROCEDURE — 0225U NFCT DS DNA&RNA 21 SARSCOV2: CPT

## 2021-12-22 PROCEDURE — 85014 HEMATOCRIT: CPT

## 2021-12-22 PROCEDURE — 84436 ASSAY OF TOTAL THYROXINE: CPT

## 2021-12-22 PROCEDURE — 87640 STAPH A DNA AMP PROBE: CPT

## 2021-12-23 ENCOUNTER — APPOINTMENT (OUTPATIENT)
Dept: HEMATOLOGY ONCOLOGY | Facility: CLINIC | Age: 71
End: 2021-12-23

## 2021-12-28 ENCOUNTER — APPOINTMENT (OUTPATIENT)
Dept: INFUSION THERAPY | Facility: HOSPITAL | Age: 71
End: 2021-12-28

## 2021-12-30 LAB
CULTURE RESULTS: SIGNIFICANT CHANGE UP
SPECIMEN SOURCE: SIGNIFICANT CHANGE UP

## 2022-01-03 NOTE — SWALLOW BEDSIDE ASSESSMENT ADULT - SLP PERTINENT HISTORY OF CURRENT PROBLEM
COVID-19    12/25: Patient evaluated for MST. Deferred calling patient due to high oxygen needs, requiring BiPAP. Report of poor appetite on admission likely related to acute illness. Symptomatic for about a week per notes. Weight trending up over last two months.     12/29: Patient continues with BiPAP/Optiflow. Poor PO intake documented x all meals.     1/3: Patient requiring Optiflow. Spoke to patient over the phone, she reports her appetite has been good. Not drinking Ensure and requesting it be discontinued.     PLAN/RECOMMENDATIONS  1. Current diet: continue regular diet   2. Oral nutrition supplement: discontinue Ensure per pt request, may add at any time   3. RD following intake trends, weight, labs.  Further recommendations based on clinical course.    Malnutrition Status: NFPA cannot be performed at this time due to current circumstances.       69 yo F with pmhx of Colon Cx s/p colectomy, CKD, asthma, afib, COPD, Diabetes, tracheomalacia, presents with worsening fever and cough since Tuesday. Pt was on medrol dose pack, was using her chest vest at home and duonebs every 4 hours without improvement.  Pt felt short of breath, cough is productive.  She has chronic cough with productive sputum, but this is worse and increased.  She normally is not able to ambulate and speak at the same time due to her baseline shortness of breath.  She does not use oxygen at home.  She saw Dr. Allison in the office last week when she was given the medrol pack. Pt denies any chest pain, dizziness, headaches. No palpitations. No abdominal pain, N/V/D. PATIENT REPORTS ALLERGY TO "SHELLFISH" AND  "ANYTHING WITH IODINE": 69 yo F with pmhx of Colon Cx s/p colectomy, CKD, asthma, afib, COPD, Diabetes, tracheomalacia, presents with worsening fever and cough since Tuesday. Pt was on medrol dose pack, was using her chest vest at home and duonebs every 4 hours without improvement.  Pt felt short of breath, cough is productive.  She has chronic cough with productive sputum, but this is worse and increased.  She normally is not able to ambulate and speak at the same time due to her baseline shortness of breath.  She does not use oxygen at home.  She saw Dr. Allison in the office last week when she was given the medrol pack. Pt denies any chest pain, dizziness, headaches. No palpitations. No abdominal pain, N/V/D.

## 2022-01-13 ENCOUNTER — OUTPATIENT (OUTPATIENT)
Dept: OUTPATIENT SERVICES | Facility: HOSPITAL | Age: 72
LOS: 1 days | Discharge: HOME | End: 2022-01-13

## 2022-01-13 DIAGNOSIS — Z87.09 PERSONAL HISTORY OF OTHER DISEASES OF THE RESPIRATORY SYSTEM: Chronic | ICD-10-CM

## 2022-01-13 DIAGNOSIS — Z98.89 OTHER SPECIFIED POSTPROCEDURAL STATES: Chronic | ICD-10-CM

## 2022-01-13 DIAGNOSIS — C18.9 MALIGNANT NEOPLASM OF COLON, UNSPECIFIED: ICD-10-CM

## 2022-01-13 DIAGNOSIS — H05.352 EXOSTOSIS OF LEFT ORBIT: Chronic | ICD-10-CM

## 2022-01-13 DIAGNOSIS — Z96.653 PRESENCE OF ARTIFICIAL KNEE JOINT, BILATERAL: Chronic | ICD-10-CM

## 2022-01-13 DIAGNOSIS — Z98.890 OTHER SPECIFIED POSTPROCEDURAL STATES: Chronic | ICD-10-CM

## 2022-01-13 DIAGNOSIS — K62.5 HEMORRHAGE OF ANUS AND RECTUM: Chronic | ICD-10-CM

## 2022-01-13 NOTE — PATIENT PROFILE ADULT - FUNCTIONAL SCREEN CURRENT LEVEL: SWALLOWING (IF SCORE 2 OR MORE FOR ANY ITEM, CONSULT REHAB SERVICES), MLM)
0 = swallows foods/liquids without difficulty Same Histology In Subsequent Stages Text: The pattern and morphology of the tumor is as described in the first stage.

## 2022-01-16 ENCOUNTER — RESULT REVIEW (OUTPATIENT)
Age: 72
End: 2022-01-16

## 2022-01-18 ENCOUNTER — NON-APPOINTMENT (OUTPATIENT)
Age: 72
End: 2022-01-18

## 2022-01-18 ENCOUNTER — APPOINTMENT (OUTPATIENT)
Dept: INFUSION THERAPY | Facility: HOSPITAL | Age: 72
End: 2022-01-18

## 2022-01-19 ENCOUNTER — APPOINTMENT (OUTPATIENT)
Dept: INTERNAL MEDICINE | Facility: CLINIC | Age: 72
End: 2022-01-19
Payer: MEDICARE

## 2022-01-19 VITALS
HEIGHT: 67 IN | SYSTOLIC BLOOD PRESSURE: 140 MMHG | OXYGEN SATURATION: 97 % | WEIGHT: 151 LBS | HEART RATE: 71 BPM | BODY MASS INDEX: 23.7 KG/M2 | DIASTOLIC BLOOD PRESSURE: 60 MMHG

## 2022-01-19 DIAGNOSIS — I35.1 NONRHEUMATIC AORTIC (VALVE) INSUFFICIENCY: ICD-10-CM

## 2022-01-19 DIAGNOSIS — Z00.00 ENCOUNTER FOR GENERAL ADULT MEDICAL EXAMINATION W/OUT ABNORMAL FINDINGS: ICD-10-CM

## 2022-01-19 LAB — FUNGUS SPT CULT: ABNORMAL

## 2022-01-19 PROCEDURE — G0439: CPT

## 2022-01-19 PROCEDURE — G0442 ANNUAL ALCOHOL SCREEN 15 MIN: CPT | Mod: 59

## 2022-01-19 RX ORDER — METOCLOPRAMIDE 5 MG/1
5 TABLET ORAL EVERY 6 HOURS
Qty: 360 | Refills: 3 | Status: COMPLETED | COMMUNITY
Start: 2021-05-13 | End: 2022-01-19

## 2022-01-19 RX ORDER — DUPILUMAB 300 MG/2ML
300 INJECTION, SOLUTION SUBCUTANEOUS
Qty: 4 | Refills: 1 | Status: COMPLETED | COMMUNITY
Start: 2019-10-08 | End: 2022-01-19

## 2022-01-19 RX ORDER — FLUTICASONE PROPIONATE 50 UG/1
50 SPRAY, METERED NASAL
Qty: 1 | Refills: 1 | Status: COMPLETED | COMMUNITY
Start: 2021-08-30 | End: 2022-01-19

## 2022-01-19 RX ORDER — DEXTROMETHORPHAN HYDROBROMIDE AND GUAIFENESIN 20; 400 MG/20ML; MG/20ML
5-100 SOLUTION ORAL EVERY 4 HOURS
Qty: 1 | Refills: 0 | Status: COMPLETED | COMMUNITY
Start: 2021-05-05 | End: 2022-01-19

## 2022-01-19 RX ORDER — AZELASTINE HYDROCHLORIDE 137 UG/1
0.1 SPRAY, METERED NASAL TWICE DAILY
Qty: 1 | Refills: 1 | Status: COMPLETED | COMMUNITY
Start: 2021-08-30 | End: 2022-01-19

## 2022-01-20 ENCOUNTER — NON-APPOINTMENT (OUTPATIENT)
Age: 72
End: 2022-01-20

## 2022-01-22 LAB
CULTURE RESULTS: SIGNIFICANT CHANGE UP
SPECIMEN SOURCE: SIGNIFICANT CHANGE UP

## 2022-01-24 ENCOUNTER — RESULT CHARGE (OUTPATIENT)
Age: 72
End: 2022-01-24

## 2022-01-24 ENCOUNTER — NON-APPOINTMENT (OUTPATIENT)
Age: 72
End: 2022-01-24

## 2022-01-27 ENCOUNTER — NON-APPOINTMENT (OUTPATIENT)
Age: 72
End: 2022-01-27

## 2022-01-27 ENCOUNTER — APPOINTMENT (OUTPATIENT)
Dept: ELECTROPHYSIOLOGY | Facility: CLINIC | Age: 72
End: 2022-01-27

## 2022-01-27 ENCOUNTER — APPOINTMENT (OUTPATIENT)
Dept: CARDIOLOGY | Facility: CLINIC | Age: 72
End: 2022-01-27
Payer: MEDICARE

## 2022-01-27 VITALS
HEART RATE: 64 BPM | BODY MASS INDEX: 21.97 KG/M2 | DIASTOLIC BLOOD PRESSURE: 76 MMHG | SYSTOLIC BLOOD PRESSURE: 160 MMHG | OXYGEN SATURATION: 97 % | WEIGHT: 140 LBS | HEIGHT: 67 IN

## 2022-01-27 PROCEDURE — 93000 ELECTROCARDIOGRAM COMPLETE: CPT

## 2022-01-27 PROCEDURE — 99214 OFFICE O/P EST MOD 30 MIN: CPT

## 2022-01-29 NOTE — PROGRESS NOTE ADULT - PROBLEM SELECTOR PROBLEM 2
Tracheobronchomalacia
No indicators present

## 2022-01-31 ENCOUNTER — NON-APPOINTMENT (OUTPATIENT)
Age: 72
End: 2022-01-31

## 2022-01-31 ENCOUNTER — APPOINTMENT (OUTPATIENT)
Dept: PULMONOLOGY | Facility: CLINIC | Age: 72
End: 2022-01-31
Payer: MEDICARE

## 2022-01-31 VITALS
BODY MASS INDEX: 21.72 KG/M2 | RESPIRATION RATE: 18 BRPM | SYSTOLIC BLOOD PRESSURE: 122 MMHG | TEMPERATURE: 97.8 F | WEIGHT: 140 LBS | DIASTOLIC BLOOD PRESSURE: 74 MMHG | HEIGHT: 67.5 IN | HEART RATE: 67 BPM | OXYGEN SATURATION: 96 %

## 2022-01-31 PROCEDURE — 99214 OFFICE O/P EST MOD 30 MIN: CPT | Mod: 25

## 2022-01-31 PROCEDURE — 95012 NITRIC OXIDE EXP GAS DETER: CPT

## 2022-01-31 PROCEDURE — 71046 X-RAY EXAM CHEST 2 VIEWS: CPT

## 2022-01-31 PROCEDURE — 94010 BREATHING CAPACITY TEST: CPT

## 2022-01-31 NOTE — HISTORY OF PRESENT ILLNESS
[FreeTextEntry1] : Ms. Bloom is a 71 year old female with a history of abnormal CXR/chest CT, allergic rhinitis, severe persistent asthma, bronchiectasis, GERD, COPD, recurrent PNA, PND, s/p tracheoplasty, TBM, and SOB presenting to the office today for a follow up visit. Her chief complaint is\par -she following her hospitalization ,she notes feeling bouncy\par -she notes he dr is weary of her being on one of her rx \par -she notes having abdomen pain this weekend \par -she notes having ice coffee that gave her a false reading \par -she notes having 100.6 degree reading over the weekend\par -pt states normal bowel movements \par -she notes doing her inhalation and exhalation exercises twice a day\par -she notes sometimes she has good mucous production \par -she notes a CT scan in 2 weeks \par -she notes her sister states she doesn't eat enough\par -she notes using albuterol inhaler \par -she notes increase in sob and mucous production

## 2022-01-31 NOTE — ASSESSMENT
[FreeTextEntry1] : Ms. Bloom is a 71 year old female with a history of mm, rectal CA, AVDz, asthma, allergy, GERD, TBM, s/p multiple pneumonias, who presents- mild sob/ mucous production c/w active asthma \par \par Her chronic SOB which is multifactorial due to:\par - tracheomalacia (s/p tracheoplasty with residual frequent mucus production, though patient is non-compliant with vest therapy)\par - chronic bronchitis\par - asthma\par - allergic rhinitis\par - GERD\par - poor breathing mechanics \par - CAD/AV disease, arrhythmia, electrolyte issue\par \par problem 1a: severe persistent asthma -(steroid dependent) - active \par -continue Medrol 4 mg qd Boost Medrol to 16 mg/day x 5 days; 12mg x3 then 8mg x3 \par -continue to use albuterol via the nebulizer QID \par -followed by Mucomyst QiD\par -followed by the acapella device/ chest vest therapy \par -(1st) followed by Perforomist via the nebulizer BID\par -(2nd) followed by Budesonide 0.5% via the nebulizer BID \par -continue to use Breo Ellipta 200 at 1 inhalation QD \par -continue to use Spiriva 1 inhalation QD\par -failed Xolair 225 injection; follow up injections every 2 weeks - Dupixent initiated (12.3.19) - to continue 300 every 2 weeks. \par -continue to use Accolate 20 mg BID\par -Information sheet given about prednisone to the patient to be reviewed, this medication is never to be used without consulting the prescribing physician. Proper dietary restraint is necessary specifically salt containing foods, if any reaction may occur should be reported. \par -Asthma is believed to be caused by inherited (genetic) and environmental factor, but its exact cause is unknown. Asthma may be triggered by allergens, lung infections, or irritants in the air. Asthma triggers are different for each person\par -Inhaler technique reviewed as well as oral hygiene techniques reviewed with patient. Avoidance of cold air, extremes of temperature, rescue inhaler should be used before exercise. Order of medication reviewed with patient. Recommended use of a cool mist humidifier in the bedroom. \par \par problem 2: tracheomalacia, residual bronchomalacia \par -s/p tracheoplasty with Dr. Mt Zapien\par -laser Bronchoscopy pending\par -continue to follow up \par -s/p f/u Dynamic chest CT 10/2019 positive w TBM - repeat PRN\par \par problem 3: chronic bronchitis and mucus clearance\par -continue to use acapella device multiple times daily\par -recommended to use chest vest therapy multiple times daily \par -she is being sent for sputum cultures \par Patient has a chronic cough greater than 6 months, tried and failed manual chest physiotherapy at home, no skilled caregiver available at home to perform manual CPT, tried and failed acapella vibratory physiotherapy, and recommended chest vest therapy \par \par Problem 3A: Multiple infections ?Active (1/31/2022) \par -off Tobramycin BID for 1 month (completed 12/20/19)\par -Complete follow up Sputum culture after completing ABx if needed. (resend) \par \par Problem 3B: multi myeloma\par -appointment  on 1/28/2020\par \par problem 4: GERD\par -continue to use Protonix 40 mg before breakfast\par -continue Baclofen 10 mg q-meal\par -Rule of 2s: avoid eating too much, eating too late, eating too spicy, eating two hours before bed\par -Things to avoid including overeating, spicy foods, tight clothing, eating within three hours of bed, this list is not all inclusive. \par -For treatment of reflux, possible options discussed including diet control, H2 blockers, PPIs, as well as coating motility agents discussed as treatment options. Timing of meals and proximity of last meal to sleep were discussed. If symptoms persist, a formal gastrointestinal evaluation is needed. \par \par problem 5: allergic rhinitis \par -continue to use nasal saline\par -continue to use Xyzal 5 mg before bed\par -Environmental measures for allergies were encouraged including mattress and pillow cover, air purifier, and environmental controls. \par \par problem 6: hx of abnormal aortic valve / cardiac health - arrhythmia \par -continue to follow up with Dr. Leahy, AV Disease, AF\par -echocardiogram in June 2018  (Tosin); Kale Tristan for f/u, Ismail\par \par problem 7: colon cancer\par -s/p radiation therapy, surgery \par -continue to use chemotherapy follow up with Dr. King or Dr. Mario\par \par problem 8: poor breathing mechanics\par -Recommended Wim Hof and Buteyko breathing techniques \par -Proper breathing techniques were reviewed with an emphasis of exhalation. Patient instructed to breath in for 1 second and out for four seconds. Patient was encouraged to not talk while walking.\par \par problem 9: immunodeficiency\par - Due to the fact that this pt has had more infections than would be expected and immunological blood work is indicated this would include: IgG subclasses, quantitative immunoglobulins, Strep pneumoniae titers as well as Vitamin D levels. Based on this blood work we will be able to decide where the pt needs additional pneumococcal vaccine either Prevnar 13 or pneumovax. Immunology evaluation will also be potentially indicated.\par \par problem 10: r/o immunodeficiency (on Medrol)\par -Due to the fact that this pt has had more infections than would be expected and immunological blood work is indicated this would include: IgG subclasses, quantitative immunoglobulins, Strep pneumoniae titers as well as Vitamin D levels.\par -Based on this blood work we will be able to decide where the pt needs additional pneumococcal vaccine either Prevnar 13 or pneumovax. Immunology evaluation will also be potentially indicated. \par \par problem 11: abnormal chest CT- ? new nodule- likely inflammation \par - F/u PET/CT 4/2019- if changed Bx (Rupali); follow up and re-evaluate as per Rupali\par -questionable DIEUDONNE or HERMELINDO, all sputum is negative \par -CAT scans are the only radiological modality to identify abnormalities w/in the lungs with regards to nodules/masses/lymph nodes. Risks, benefits were reviewed in detail. The guidelines for abnormalities include follow up CT scans at various intervals which could range from 6 weeks to 1 year intervals. If there is a change for the worse then consideration for a biopsy will be considered if you are a candidate. Second opinion evaluation with a thoracic surgeon or an interventional radiologist could be offered. 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 It sometimes develops following a URI, herpes zoster outbreak in pharynx or thyroid or cervical spine injury. However, many patients have no identifiable antecedent explanation. \par \par Problem 14: Health Maintenance/COVID19 Precautions \par -s/p  Moderna COVID 19 vaccine x 2 (#3 needed) \par - Clean your hands often. Wash your hands often with soap and water for at least 20 seconds, especially after blowing your nose, coughing, or sneezing, or having been in a public place.\par - If soap and water are not available, use a hand  that contains at least 60% alcohol.\par - To the extent possible, avoid touching high-touch surfaces in public places - elevator buttons, door handles, handrails, handshaking with people, etc. Use a tissue or your sleeve to cover your hand or finger if you must touch something.\par - Wash your hands after touching surfaces in public places.\par - Avoid touching your face, nose, eyes, etc.\par - Clean and disinfect your home to remove germs: practice routine cleaning of frequently touched surfaces (for example: tables, doorknobs, light switches, handles, desks, toilets, faucets, sinks & cell phones)\par - Avoid crowds, especially in poorly ventilated spaces. Your risk of exposure to respiratory viruses like COVID-19 may increase in crowded, closed-in settings with little air circulation if there are people in the crowd who are sick. All patients are recommended to practice social distancing and stay at least 6 feet away from others. \par - Avoid all non-essential travel including plane trips, and especially avoid embarking on cruise ships.\par -If COVID-19 is spreading in your community, take extra measures to put distance between yourself and other people to further reduce your risk of being exposed to this new virus.\par -Stay home as much as possible.\par - Consider ways of getting food brought to your house through family, social, or commercial networks\par -Be aware that the virus has been known to live in the air up to 3 hours post exposure, cardboard up to 24 hours post exposure, copper up to 4 hours post exposure, steel and plastic up to 2-3 days post exposure. Risk of transmission from these surfaces are affected by many variables.\par COVID-19 precautionary Immune Support Recommendations:\par -OTC Vitamin C 500mg BID \par -OTC Quercetin 250-500mg BID \par -OTC Zinc 75-100mg per day \par -OTC Melatonin 1 or 2mg a night \par -OTC Vitamin D 1-4000mg per day \par -OTC Tonic Water 8oz per day\par -Water 0.5-1 gallon per day\par Asthma and COVID19:\par You need to make sure your asthma is under control. This often requires the use of inhaled corticosteroids (and sometimes oral corticosteroids). Inhaled corticosteroids do not likely reduce your immune system’s ability to fight infections, but oral corticosteroids may. It is important to use the steps above to protect yourself to limit your exposure to any respiratory virus. \par \par problem 15:  health maintenance \par -s/p flu shot 10/30/2020\par -6/2021 Shingeles: Valacyclovir in progress \par -recommended strep pneumonia vaccines: Prevnar-13 vaccine, followed by Pneumo vaccine 23 one year following\par -recommended early intervention for URIs\par -recommended regular osteoporosis evaluations\par -recommended early dermatological evaluations\par -recommended after the age of 50 to the age of 70, colonoscopy every 5 years \par \par F/U in 6 weeks - SPI / NIOX\par She is encouraged to call with any changes, concerns, or questions.

## 2022-01-31 NOTE — ADDENDUM
Refill Approved    Rx renewed per Medication Renewal Policy. Medication was last renewed on 09/24/2019.  Last office visit was 10/30/2019 with PCP. No upcoming appointments made. Note sent to pharmacy.    Miroslava Davis, [facility] Triage/Med Refill 10/6/2020     Requested Prescriptions   Pending Prescriptions Disp Refills     atorvastatin (LIPITOR) 20 MG tablet [Pharmacy Med Name: ATORVASTATIN 20 MG TABLET] 90 tablet 3     Sig: TAKE 1 TABLET BY MOUTH AT BEDTIME.       Statins Refill Protocol (Hmg CoA Reductase Inhibitors) Passed - 10/3/2020  9:19 AM        Passed - PCP or prescribing provider visit in past 12 months      Last office visit with prescriber/PCP: 10/30/2019 Anuradha Dunn CNP OR same dept: 10/30/2019 Anuradha Dunn CNP OR same specialty: 10/30/2019 Anuradha Dunn CNP  Last physical: Visit date not found Last MTM visit: Visit date not found   Next visit within 3 mo: Visit date not found  Next physical within 3 mo: Visit date not found  Prescriber OR PCP: Anuradha Dunn CNP  Last diagnosis associated with med order: 1. Type 2 diabetes, uncontrolled, with neuropathy (H)  - atorvastatin (LIPITOR) 20 MG tablet [Pharmacy Med Name: ATORVASTATIN 20 MG TABLET]; TAKE 1 TABLET BY MOUTH AT BEDTIME.  Dispense: 90 tablet; Refill: 3    If protocol passes may refill for 12 months if within 3 months of last provider visit (or a total of 15 months).                             [FreeTextEntry1] : Documented by Chase Dominguez acting as a scribe for Dr. Eulalio Allison on 01/31/2022 .\par \par All medical record entries made by the Scribe were at my, Dr. Eulalio Allison's, direction and personally dictated by me on. I have reviewed the chart and agree that the record accurately reflects my personal performance of the history, physical exam, assessment and plan. I have also personally directed, reviewed, and agree with the discharge instructions.

## 2022-01-31 NOTE — PROCEDURE
[FreeTextEntry1] : FENO was  15     ; a normal value being less than 25\par Fractional exhaled nitric oxide (FENO) is regarded as a simple, noninvasive method for assessing eosinophilic airway inflammation. Produced by a variety of cells within the lung, nitric oxide (NO) concentrations are generally low in healthy individuals. However, high concentrations of NO appear to be involved in nonspecific host defense mechanisms and chronic inflammatory diseases such as asthma. The American Thor[employer] Society (ATS) therefore has recommended using FENO to aid in the diagnosis and monitoring of eosinophilic airway inflammation and asthma, and for identifying steroid responsive individuals whose chronic respiratory symptoms may be caused by airway inflammation. \par \par CXR reveals a normal sized heart; no evidence of infiltrate or effusion- port on the right \par \par PFT - spi reveals mild restrictive and severe obstructive flows; FEV1 is 0.91 which is 45% of predicted, normal flow volume loop

## 2022-01-31 NOTE — PHYSICAL EXAM

## 2022-02-01 NOTE — PATIENT PROFILE ADULT. - NS PRO PT REFERRAL QUES 2 YN
Patient : Adolph Thomas Age: 83 year old Sex: male   MRN: [id number] Encounter Date: 1/31/2022      History     No chief complaint on file.    HPI     The patient is a an 83-year-old male presenting for evaluation of chills and tremors.  Reports symptoms started earlier today.  Denies a history of tremors.  Was visiting his wife in the hospital in the symptoms started.  Reports that he felt off balance.  He denies cough, vomiting or diarrhea.  Denies any pain.    Allergies   Allergen Reactions   • Penicillin [Penicillin G Potassium] HIVES   • Aminoglycosides HIVES     antibiotics   • Lescol [Fluvastatin Sodium]      myalagia   • Lipitor [Atorvastatin Calcium]      myalgia's   • Zocor [Simvastatin]      myalgia       Current Discharge Medication List      Prior to Admission Medications    Details   omeprazole (PRILOSEC) 20 MG capsule Take 20 mg by mouth daily.      rosuvastatin (CRESTOR) 5 MG tablet Take 5 mg by mouth daily.      HYDROCODONE-ACETAMINOPHEN PO Take  by mouth.      metoPROLOL (LOPRESSOR) 25 MG tablet Take 25 mg by mouth every 12 hours.      RANITIDINE  MG PO TABS 1 TAB PO BID  Qty: 180, Refills: 3      VIAGRA 100 MG PO TABS 1 TABLET DAILY AS NEEDED  Qty: 18, Refills: 3      NIASPAN 750 MG PO TBCR TWO TABLETS BY MOUTH AT BEDTIME  Qty: 180, Refills: 3      ALEVE 220 MG PO TABS 1 TABLET EVERY 12 HOURS AS NEEDED  Qty: 0, Refills: 0      FISH OIL 1000 MG PO CAPS 2 caps daily  Qty: 0, Refills: 0      PRAVASTATIN SODIUM 20 MG PO TABS 1 TABLET BY MOUTH AT BEDTIME  Qty: 90, Refills: 3      ASPIRIN 81 MG PO CPEP 1 CAPSULE DAILY  Qty: 0, Refills: 0             Past Medical History:   Diagnosis Date   • Acute myocardial infarction, subendocardial infarction, initial episode of care 02/07/00    small subendocardial myocardial infarction   • Blepharitis, unspecified    • Hordeolum externum    • Macular degeneration (senile) of retina, unspecified    • Other and unspecified hyperlipidemia     tried  lipitor,zocor,lescol caused myalgias   • Senile nuclear sclerosis    • Special screening for malignant neoplasms, colon 7/05   • Vitreous degeneration        Past Surgical History:   Procedure Laterality Date   • APPENDECTOMY  10 years old   • ARTHROSCOPY SHLD W/ROT CUFF RP  3/15/07    right shoulder scopeRCR, subacromial  decompression, distal clavicle resect acrom Dr PM.   • CABG, ARTERY-VEIN, THREE  2/9/2000    CABG x 3 performed by Dr Love @ [facility]; LIMA anastomosed to LAD,RSVG anastomosed to OM & PDA   • CARPAL TUNNEL RELEASE  early 1990s    Right Carpal tunnel release   • COLONOSCOPY DIAGNOSTIC  7/15/05    Normal, Dr Zabala   • ECHO HEART RESTING  02/07/2000    done @ [facility];EF 58 percent;Dilated lt atrium.Nml lt ventricular systolic function and wall motion.Evidence for mild diastolic dysfunction of the lt ventricle. No significant valvular disease.   • LAPAROSCOPY, CHOLECYSTECTOMY  4/07    Cholecystectomy, laparoscopic   • LEFT HEART CATH,PERCUTANEOUS  2/8/2000    Cardiac Cath performed by Dr YEISON Esparza @[facility]:Lt main nml;LAD 1st 1/3 of LAD severly dilated via atherosclerosis, it terminates in about 99% stenosis of mid LAD;circumflex also dilated to small degree & in mid vessel more severly. This terminates in a 90% stenosis of the distal circumflex which is  the PD branch & distal circumflex together. RCA small, tiny nondominant vessel. L   • PAST SURGICAL HISTORY  1986,1990    cochlear implant   • PAST SURGICAL HISTORY  1965    pylonidal cyst   • REMOVAL OF TONSILS,<11 Y/O  10 years old    Tonsillectomy   • TRANSURETHRAL ELEC-SURG PROSTATECTOM  1995       Family History   Problem Relation Age of Onset   • Stroke Mother    • Cancer Father         stomach   • Cancer Brother         leukemia   • Cancer Brother         brain   • Heart Brother         cad   • Heart Brother         cad   • Heart Daughter         afib   • Heart Son         afib       Social History     Tobacco Use    • Smoking status: Former Smoker     Packs/day: 1.50     Years: 25.00     Pack years: 37.50     Types: Cigarettes     Quit date: 1985     Years since quittin.1   • Smokeless tobacco: Not on file   • Tobacco comment: quit /chewing tobacco-    Substance Use Topics   • Alcohol use: Yes     Comment: occasionally-beer/larry/wk   • Drug use: No       E-cigarette/Vaping     E-Cigarette/Vaping Substances & Devices       Review of Systems   Constitutional: Positive for fever.   Neurological: Positive for tremors.   All other systems reviewed and are negative.      Physical Exam     ED Triage Vitals [22]   ED Triage Vitals Group      Temp 100.2 °F (37.9 °C)      Heart Rate (!) 108      Resp 20      /84      SpO2 98 %      EtCO2 mmHg       Height       Weight 177 lb 14.6 oz (80.7 kg)      Weight Scale Used Scale in bed      BMI (Calculated)       IBW/kg (Calculated)        Physical Exam  Vitals and nursing note reviewed.   Constitutional:       General: He is not in acute distress.     Appearance: He is well-developed.   HENT:      Head: Normocephalic and atraumatic.   Eyes:      General:         Right eye: No discharge.         Left eye: No discharge.      Conjunctiva/sclera: Conjunctivae normal.   Neck:      Vascular: No JVD.   Cardiovascular:      Rate and Rhythm: Normal rate.   Pulmonary:      Effort: Pulmonary effort is normal.   Abdominal:      General: There is no distension.      Palpations: Abdomen is soft. There is no mass.      Tenderness: There is no abdominal tenderness.   Musculoskeletal:         General: Normal range of motion.      Cervical back: Normal range of motion.   Skin:     General: Skin is warm and dry.      Findings: No rash.   Neurological:      Mental Status: He is alert and oriented to person, place, and time.      Cranial Nerves: No cranial nerve deficit.      Sensory: No sensory deficit.      Motor: No weakness or abnormal muscle tone.      Coordination:  Coordination normal.      Comments: Intermittent tremors of the upper extremities         ED Course     Procedures    Lab Results     Results for orders placed or performed during the hospital encounter of 01/31/22   Comprehensive Metabolic Panel   Result Value Ref Range    Fasting Status      Sodium 140 135 - 145 mmol/L    Potassium 4.4 3.4 - 5.1 mmol/L    Chloride 103 98 - 107 mmol/L    Carbon Dioxide 28 21 - 32 mmol/L    Anion Gap 13 10 - 20 mmol/L    Glucose 108 (H) 70 - 99 mg/dL    BUN 20 6 - 20 mg/dL    Creatinine 1.31 (H) 0.67 - 1.17 mg/dL    Glomerular Filtration Rate 49 (L) >=60    BUN/ Creatinine Ratio 15 7 - 25    Calcium 8.3 (L) 8.4 - 10.2 mg/dL    Bilirubin, Total 0.5 0.2 - 1.0 mg/dL    GOT/AST 17 <=37 Units/L    GPT/ALT 27 <64 Units/L    Alkaline Phosphatase 59 45 - 117 Units/L    Albumin 3.2 (L) 3.6 - 5.1 g/dL    Protein, Total 6.0 (L) 6.4 - 8.2 g/dL    Globulin 2.8 2.0 - 4.0 g/dL    A/G Ratio 1.1 1.0 - 2.4   Lactic Acid Venous With Reflex   Result Value Ref Range    Lactate, Venous 2.0 0.0 - 2.0 mmol/L   Urinalysis & Reflex Microscopy With Culture If Indicated   Result Value Ref Range    COLOR, URINALYSIS Yellow     APPEARANCE, URINALYSIS Clear     GLUCOSE, URINALYSIS Negative Negative mg/dL    BILIRUBIN, URINALYSIS Negative Negative    KETONES, URINALYSIS Negative Negative mg/dL    SPECIFIC GRAVITY, URINALYSIS 1.015 1.005 - 1.030    OCCULT BLOOD, URINALYSIS Trace (A) Negative    PH, URINALYSIS 7.0 5.0 - 7.0    PROTEIN, URINALYSIS Negative Negative mg/dL    UROBILINOGEN, URINALYSIS 1.0 0.2, 1.0 mg/dL    NITRITE, URINALYSIS Negative Negative    LEUKOCYTE ESTERASE, URINALYSIS Negative Negative    SQUAMOUS EPITHELIAL, URINALYSIS 1 to 5 None Seen, 1 to 5 /hpf    ERYTHROCYTES, URINALYSIS 6 to 10 (A) None Seen, 1 to 2 /hpf    LEUKOCYTES, URINALYSIS None Seen None Seen, 1 to 5 /hpf    BACTERIA, URINALYSIS Few (A) None Seen /hpf    HYALINE CASTS, URINALYSIS None Seen None Seen, 1 to 5 /lpf    MUCUS Present     CBC with Automated Differential (performable only)   Result Value Ref Range    WBC 6.1 4.2 - 11.0 K/mcL    RBC 3.50 (L) 4.50 - 5.90 mil/mcL    HGB 11.2 (L) 13.0 - 17.0 g/dL    HCT 34.6 (L) 39.0 - 51.0 %    MCV 98.9 78.0 - 100.0 fl    MCH 32.0 26.0 - 34.0 pg    MCHC 32.4 32.0 - 36.5 g/dL    RDW-CV 12.8 11.0 - 15.0 %    RDW-SD 45.7 39.0 - 50.0 fL     140 - 450 K/mcL    NRBC 0 <=0 /100 WBC    Neutrophil, Percent 83 %    Lymphocytes, Percent 5 %    Mono, Percent 10 %    Eosinophils, Percent 0 %    Basophils, Percent 1 %    Immature Granulocytes 1 %    Absolute Neutrophils 5.2 1.8 - 7.7 K/mcL    Absolute Lymphocytes 0.3 (L) 1.0 - 4.0 K/mcL    Absolute Monocytes 0.6 0.3 - 0.9 K/mcL    Absolute Eosinophils  0.0 0.0 - 0.5 K/mcL    Absolute Basophils 0.0 0.0 - 0.3 K/mcL    Absolute Immmature Granulocytes 0.0 0.0 - 0.2 K/mcL   Rapid SARS-CoV-2 by PCR    Specimen: Nasopharyngeal; Swab   Result Value Ref Range    Rapid SARS-COV-2 by PCR Not Detected Not Detected / Detected / Presumptive Positive / Inhibitors present    Isolation Guidelines      Procedural Comment         EKG Results     EKG Interpretation  Rate: 107  Rhythm: atrial fibrillation   Abnormality: yes    EKG tracing interpreted by ED physician    Radiology Results     Imaging Results          XR Chest AP or PA (Final result)  Result time 01/31/22 21:40:51    Final result                 Impression:    IMPRESSION: No acute cardiopulmonary disease.             Narrative:      EXAM: XR CHEST AP OR PA    DATE: 1/31/2022 9:16 PM    COMPARISON: None    CLINICAL HISTORY: Chest pain    FINDINGS: The lungs are clear of infiltrate.    There are no pleural effusions.    The heart size is normal. The aorta is tortuous and demonstrates  atherosclerotic calcifications. Manifestations of cardiac surgery are  redemonstrated.    Degenerative changes of the spine are present.                                ED Medication Orders (From admission, onward)    Ordered Start      Status Ordering Provider    01/31/22 2152 01/31/22 2153  HYDROcodone-acetaminophen (NORCO) 5-325 MG per tablet 1 tablet  ONCE         Last MAR action: Given CASIMIRO VALDEZ    01/31/22 2152 01/31/22 2153  acetaminophen (TYLENOL) tablet 650 mg  ONCE         Last MAR action: Given CASIMIRO VALDEZ    01/31/22 2107 01/31/22 2108  sodium chloride (NORMAL SALINE) 0.9 % bolus 1,000 mL  ONCE         Last MAR action: New Bag CASIMIRO VALDEZ               [facility]     The patient is an 83-year-old male presenting for evaluation of a fever, chills and tremors.  Febrile at time of arrival with slight tachycardia.  IV placed, labs were obtained including blood cultures and lactic acid.  Unclear source so a urinalysis, chest x-ray and COVID swab were obtained.    Lab work is really quite reassuring.  Chest x-ray, urinalysis and COVID swab without evidence of infection.  After Tylenol and Norco his heart rate normalized, temperature came down and he had no further tremors.  He will be discharged home with recommendations for symptomatic and supportive care with primary care follow-up if symptoms change or worsen.  He voices understanding of these recommendations.  Patient and daughter are comfortable with this plan.    Clinical Impression     ED Diagnosis   1. Fever, unspecified fever cause         Disposition        Discharge 1/31/2022 10:52 PM  Adolph Thomas discharge to home/self care.                         Casimiro Valdez MD  01/31/22 6872     no

## 2022-02-07 ENCOUNTER — NON-APPOINTMENT (OUTPATIENT)
Age: 72
End: 2022-02-07

## 2022-02-08 ENCOUNTER — APPOINTMENT (OUTPATIENT)
Dept: INFUSION THERAPY | Facility: HOSPITAL | Age: 72
End: 2022-02-08

## 2022-02-09 ENCOUNTER — NON-APPOINTMENT (OUTPATIENT)
Age: 72
End: 2022-02-09

## 2022-02-09 ENCOUNTER — APPOINTMENT (OUTPATIENT)
Dept: CT IMAGING | Facility: IMAGING CENTER | Age: 72
End: 2022-02-09
Payer: MEDICARE

## 2022-02-09 ENCOUNTER — OUTPATIENT (OUTPATIENT)
Dept: OUTPATIENT SERVICES | Facility: HOSPITAL | Age: 72
LOS: 1 days | End: 2022-02-09
Payer: MEDICARE

## 2022-02-09 DIAGNOSIS — Z96.653 PRESENCE OF ARTIFICIAL KNEE JOINT, BILATERAL: Chronic | ICD-10-CM

## 2022-02-09 DIAGNOSIS — Z87.09 PERSONAL HISTORY OF OTHER DISEASES OF THE RESPIRATORY SYSTEM: Chronic | ICD-10-CM

## 2022-02-09 DIAGNOSIS — Z98.89 OTHER SPECIFIED POSTPROCEDURAL STATES: Chronic | ICD-10-CM

## 2022-02-09 DIAGNOSIS — H05.352 EXOSTOSIS OF LEFT ORBIT: Chronic | ICD-10-CM

## 2022-02-09 DIAGNOSIS — Z98.890 OTHER SPECIFIED POSTPROCEDURAL STATES: Chronic | ICD-10-CM

## 2022-02-09 DIAGNOSIS — K62.5 HEMORRHAGE OF ANUS AND RECTUM: Chronic | ICD-10-CM

## 2022-02-09 DIAGNOSIS — C21.1 MALIGNANT NEOPLASM OF ANAL CANAL: ICD-10-CM

## 2022-02-09 PROCEDURE — 71260 CT THORAX DX C+: CPT | Mod: 26,MH

## 2022-02-09 PROCEDURE — 71260 CT THORAX DX C+: CPT

## 2022-02-09 PROCEDURE — 74177 CT ABD & PELVIS W/CONTRAST: CPT

## 2022-02-09 PROCEDURE — 82565 ASSAY OF CREATININE: CPT

## 2022-02-09 PROCEDURE — 74177 CT ABD & PELVIS W/CONTRAST: CPT | Mod: 26,MH

## 2022-02-10 ENCOUNTER — APPOINTMENT (OUTPATIENT)
Dept: PULMONOLOGY | Facility: CLINIC | Age: 72
End: 2022-02-10
Payer: MEDICARE

## 2022-02-10 VITALS — BODY MASS INDEX: 21.72 KG/M2 | WEIGHT: 140 LBS | HEIGHT: 67.5 IN

## 2022-02-10 PROCEDURE — 99213 OFFICE O/P EST LOW 20 MIN: CPT | Mod: 95

## 2022-02-10 NOTE — HISTORY OF PRESENT ILLNESS
[Home] : at home, [unfilled] , at the time of the visit. [Medical Office: ([facility])___] : at the medical office located in  [Verbal consent obtained from patient] : the patient, [unfilled] [FreeTextEntry1] : VISIT 1/31/2022:\par Ms. Bloom is a 71 year old female with a history of abnormal CXR/chest CT, allergic rhinitis, severe persistent asthma, bronchiectasis, GERD, COPD, recurrent PNA, PND, s/p tracheoplasty, TBM, and SOB presenting to the office today for a follow up visit. Her chief complaint is\par -she following her hospitalization ,she notes feeling bouncy\par -she notes he dr is weary of her being on one of her rx \par -she notes having abdomen pain this weekend \par -she notes having ice coffee that gave her a false reading \par -she notes having 100.6 degree reading over the weekend\par -pt states normal bowel movements \par -she notes doing her inhalation and exhalation exercises twice a day\par -she notes sometimes she has good mucous production \par -she notes a CT scan in 2 weeks \par -she notes her sister states she doesn't eat enough\par -she notes using albuterol inhaler \par -she notes increase in sob and mucous production \par \par VISIT TODAY 2/10/2022:\par Pt is in via telehealth visit for follow up for the above.\par She reports that she is still not feeling better and may actually be feeling worse on some days. \par She had fever prior to the visit and then had another 100.9 temp later in the week after her visit. \par Her cough has lessened some but her SOB is still present with conversation and any activity.\par After going through her steroid taper, she has taken double the dose Dr. Allison wanted her to without relief. \par She had a CT chest completed and needed high dose steroids to complete that, she did not see much difference. \par We are awaiting sputum culture results still. \par She has been in contact with Dr. Stout who is holding off on abx until CT results are back. \par She is compliant on her medication regimen.

## 2022-02-10 NOTE — ASSESSMENT
[FreeTextEntry1] : Ms. Bloom is a 71 year old female with a history of mm, rectal CA, AVDz, asthma, allergy, GERD, TBM, s/p multiple pneumonias, who presents via telehealth due to continued SOB despite being on steroids, previously diagnosed with asthma flare. \par \par Her chronic SOB which is multifactorial due to:\par - tracheomalacia (s/p tracheoplasty with residual frequent mucus production, though patient is non-compliant with vest therapy)\par - chronic bronchitis\par - asthma\par - allergic rhinitis\par - GERD\par - poor breathing mechanics \par - CAD/AV disease, arrhythmia, electrolyte issue\par \par Problem 1: Acute on chronic SOB\par -given no relief from steroids and hx of fevers- worth going for an RVP\par -CT chest pending\par -Sputum culture pending\par -add d-dimer\par \par problem 1a: severe persistent asthma -(steroid dependent) - active \par -continue Medrol taper: now on Medrol to 16 mg/day x 5 days; 12mg x3 then 8mg x3 (pt started at 32 mg)\par -continue to use albuterol via the nebulizer QID \par -followed by Mucomyst QiD\par -followed by the acapella device/ chest vest therapy \par -(1st) followed by Perforomist via the nebulizer BID\par -(2nd) followed by Budesonide 0.5% via the nebulizer BID \par -continue to use Breo Ellipta 200 at 1 inhalation QD \par -continue to use Spiriva 1 inhalation QD\par Dupixent initiated (12.3.19) - to continue 300 every 2 weeks. \par -continue to use Accolate 20 mg BID\par \par problem 2: tracheomalacia, residual bronchomalacia \par -s/p tracheoplasty with Dr. Mt Zapien\par -laser Bronchoscopy pending\par -continue to follow up \par -s/p f/u Dynamic chest CT 10/2019 positive w TBM - repeat PRN\par \par problem 3: chronic bronchitis and mucus clearance\par -continue to use acapella device multiple times daily\par -recommended to use chest vest therapy multiple times daily \par -sputum culture pending\par \par Problem 3A: Multiple infections ?Active (1/31/2022) \par -sputum culture pending\par \par Problem 3B: multi myeloma\par -appointment  on 1/28/2020\par \par problem 4: GERD\par -continue to use Protonix 40 mg before breakfast\par -continue Baclofen 10 mg q-meal\par -Rule of 2s: avoid eating too much, eating too late, eating too spicy, eating two hours before bed\par \par problem 5: allergic rhinitis \par -continue to use nasal saline\par -continue to use Xyzal 5 mg before bed\par \par F/U as scheduled with \par we will call her with results as they come over\par She is encouraged to call with any changes, concerns, or questions.

## 2022-02-10 NOTE — REVIEW OF SYSTEMS
[Negative] : Endocrine [Cough] : cough [Chest Tightness] : chest tightness [Dyspnea] : dyspnea [SOB on Exertion] : sob on exertion [TextBox_3] : a few times in the last 2 weeks [TextBox_30] : "gurgling"

## 2022-02-10 NOTE — PHYSICAL EXAM
[No Acute Distress] : no acute distress [Well Nourished] : well nourished [Well Groomed] : well groomed [No Deformities] : no deformities [Well Developed] : well developed [Normal Appearance] : normal appearance [No Resp Distress] : no resp distress [Oriented x3] : oriented x3 [Normal Mood] : normal mood [Normal Affect] : normal affect [TextBox_11] : unable to assess [TextBox_44] : unable to assess [TextBox_54] : unable to assess [TextBox_68] : unable to assess [TextBox_80] : unable to assess [TextBox_89] : unable to assess [TextBox_99] : unable to assess [TextBox_105] : unable to assess [TextBox_125] : unable to assess [TextBox_132] : unable to assess

## 2022-02-11 ENCOUNTER — RESULT REVIEW (OUTPATIENT)
Age: 72
End: 2022-02-11

## 2022-02-11 ENCOUNTER — APPOINTMENT (OUTPATIENT)
Dept: HEMATOLOGY ONCOLOGY | Facility: CLINIC | Age: 72
End: 2022-02-11

## 2022-02-11 LAB
ALBUMIN SERPL ELPH-MCNC: 3.8 G/DL
ALP BLD-CCNC: 87 U/L
ALT SERPL-CCNC: 18 U/L
ANION GAP SERPL CALC-SCNC: 17 MMOL/L
AST SERPL-CCNC: 14 U/L
BASOPHILS # BLD AUTO: 0.03 K/UL — SIGNIFICANT CHANGE UP (ref 0–0.2)
BASOPHILS NFR BLD AUTO: 0.2 % — SIGNIFICANT CHANGE UP (ref 0–2)
BILIRUB SERPL-MCNC: <0.2 MG/DL
BUN SERPL-MCNC: 26 MG/DL
CALCIUM SERPL-MCNC: 9 MG/DL
CEA SERPL-MCNC: 5.9 NG/ML
CHLORIDE SERPL-SCNC: 102 MMOL/L
CO2 SERPL-SCNC: 20 MMOL/L
CREAT SERPL-MCNC: 0.75 MG/DL
EOSINOPHIL # BLD AUTO: 0 K/UL — SIGNIFICANT CHANGE UP (ref 0–0.5)
EOSINOPHIL NFR BLD AUTO: 0 % — SIGNIFICANT CHANGE UP (ref 0–6)
GLUCOSE SERPL-MCNC: 368 MG/DL
HCT VFR BLD CALC: 41.6 % — SIGNIFICANT CHANGE UP (ref 34.5–45)
HGB BLD-MCNC: 12.7 G/DL — SIGNIFICANT CHANGE UP (ref 11.5–15.5)
IMM GRANULOCYTES NFR BLD AUTO: 1.4 % — SIGNIFICANT CHANGE UP (ref 0–1.5)
LYMPHOCYTES # BLD AUTO: 0.72 K/UL — LOW (ref 1–3.3)
LYMPHOCYTES # BLD AUTO: 4.1 % — LOW (ref 13–44)
MCHC RBC-ENTMCNC: 23 PG — LOW (ref 27–34)
MCHC RBC-ENTMCNC: 30.5 G/DL — LOW (ref 32–36)
MCV RBC AUTO: 75.5 FL — LOW (ref 80–100)
MONOCYTES # BLD AUTO: 0.57 K/UL — SIGNIFICANT CHANGE UP (ref 0–0.9)
MONOCYTES NFR BLD AUTO: 3.2 % — SIGNIFICANT CHANGE UP (ref 2–14)
NEUTROPHILS # BLD AUTO: 16.01 K/UL — HIGH (ref 1.8–7.4)
NEUTROPHILS NFR BLD AUTO: 91.1 % — HIGH (ref 43–77)
NRBC # BLD: 0 /100 WBCS — SIGNIFICANT CHANGE UP (ref 0–0)
PLATELET # BLD AUTO: 354 K/UL — SIGNIFICANT CHANGE UP (ref 150–400)
POTASSIUM SERPL-SCNC: 4.3 MMOL/L
PROT SERPL-MCNC: 6 G/DL
RBC # BLD: 5.51 M/UL — HIGH (ref 3.8–5.2)
RBC # FLD: 15.9 % — HIGH (ref 10.3–14.5)
SODIUM SERPL-SCNC: 139 MMOL/L
WBC # BLD: 17.57 K/UL — HIGH (ref 3.8–10.5)
WBC # FLD AUTO: 17.57 K/UL — HIGH (ref 3.8–10.5)

## 2022-02-14 LAB
ACID FAST STN SPT: NORMAL
BACTERIA SPT CULT: ABNORMAL

## 2022-02-16 ENCOUNTER — NON-APPOINTMENT (OUTPATIENT)
Age: 72
End: 2022-02-16

## 2022-02-18 ENCOUNTER — APPOINTMENT (OUTPATIENT)
Dept: INFECTIOUS DISEASE | Facility: CLINIC | Age: 72
End: 2022-02-18
Payer: MEDICARE

## 2022-02-18 VITALS
SYSTOLIC BLOOD PRESSURE: 150 MMHG | RESPIRATION RATE: 18 BRPM | BODY MASS INDEX: 22.8 KG/M2 | HEIGHT: 67.5 IN | HEART RATE: 96 BPM | DIASTOLIC BLOOD PRESSURE: 82 MMHG | OXYGEN SATURATION: 96 % | TEMPERATURE: 98.4 F | WEIGHT: 147 LBS

## 2022-02-18 DIAGNOSIS — B37.0 CANDIDAL STOMATITIS: ICD-10-CM

## 2022-02-18 PROCEDURE — 99214 OFFICE O/P EST MOD 30 MIN: CPT

## 2022-02-18 NOTE — REVIEW OF SYSTEMS
"""Recommended OTC artificial tears two - four times a day as needed. """ [Shortness Of Breath] : shortness of breath [Cough] : cough [Sputum] : coughing up ~M sputum [As Noted in HPI] : as noted in HPI [Joint Pain] : joint pain [Negative] : Heme/Lymph

## 2022-02-18 NOTE — HISTORY OF PRESENT ILLNESS
[FreeTextEntry1] : 70 yo F presents today for abnormal sputum cultures.\par Not seen since 3/2021.\par Noted that she developed a cough. Sputum was collected and she grew Klebsiella and acinetobacter and chryseobacter.  She was treated with 1 week of levaquin.  Still has cough. \par \par Also noted hematuria.  Gave UA and urine culture today at oncology office. Denies dysuria or other symptoms of UTI.  Has been present for about 1 month.  May need to see Urology. \par \par Previous cultures had repeated pseudomonas.  \par Found to have pseudomonal infection on bronchoscopy 2/14/2020. Treated  IV aztreonam  for pseudomonas via port.  \par \par Also used to have chronic wheezing and shortness of breath.  Found to have PVCs and being treated for this. Wheezing has stopped. \par \par \par \par PMH:\par Asthma (severe persistent, steroid dependent)\par Tracheobronchomalacia s/p tracheobronchoplasty 2018\par Adrenal insufficiency - on steroids >50 years  Takes Medrol 12 mg am and 12  mg pm. \par Diabetes\par Afib\par Aortic insufficiency\par Colorectoal Cancer 2017 s/p resection and chemo/xrt with diverting ostomy\par Pinched nerve in neck\par  MRSA boil on Right lower abdomen. 12/2019\par PVC\par \par She was referred to immunology b/c of recurrent infections.  T cell count is low.  Mumps protection weaned.  Found to have MGUS, IgA monoclonal gammopathy.   \par \par \par Pt also reports that she has been seen at [facility] >30 years ago .  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 \par \par Did take tobramycin for about one month for h/o pseudomonas.\par \par Using acapella\par Has vest but she's not using it. Is heavy and feels it doesn't help her. \par \par To see Dr. Mcclellan next week for TBM f/up. \par \par 7/2021 CT chest:\par Secretions w/in right and left mainstem bronchi.  CLusters of tiny nodules in the distal right middle lobe and right lower lobe are unchanged and c/w distal airway impaction. \par \par New info 2/18/22\par Called not feeling well. Sputum with MRSA. Given medrol. WBC checked and was high 17. \par Given bactrim for MRSA but still feels sob. Tolerated cefidinir in the past. Checked prior meds.\par Spoke to dr. frazier. Recommended increased steroids back to 20 mg for 1 week.\par CT chest done. Was stable. Only mild worsening in opacities. No clear pneumonia. \par

## 2022-02-18 NOTE — CONSULT LETTER
[Dear  ___] : Dear  [unfilled], [Consult Letter:] : I had the pleasure of evaluating your patient, [unfilled]. [Consult Closing:] : Thank you very much for allowing me to participate in the care of this patient.  If you have any questions, please do not hesitate to contact me. [Sincerely,] : Sincerely, [FreeTextEntry2] : Dr. Eulalio Allison [FreeTextEntry3] : \par Afshan Stout MD\par  of Medicine\par Division of Infectious Diseases\par [facility] [employer] [facility] [employer] [facility]\par [address] [place] [address] DrShreya\[place] [address]\par Tel: [phone]\par Fax: [phone]

## 2022-02-18 NOTE — ASSESSMENT
[FreeTextEntry1] : This is a 70 yo F with extensive medical history notable for abnormal CT chest with nodules, persistent severe asthma steroid dependent, tracheobronchomalacia s/p tracheoplasty, AFIB on Eliquis,  who presented for recurrent respiratory infections and new MRSA boil in 1/2020   Boil has since resolved.\par \par Pt now returns b/c her  cough worsened with more mucus.  Sputum culture positive for MRSA. Trial of bactrim given. No improvement.  Still with bad cough.\par \par Check RVP\par Add cefdinir x 10 days. PCN allergy but has tolerated this in the past.\par CT chest w/o pneumonia.\par Steroids as per dr. frazier.\par WBC was high due to Medrol.\par \par She should continue with mucus clearing devices. \par \par She can return as needed and will call to check in next week.\par \par

## 2022-02-18 NOTE — PHYSICAL EXAM
Called and discussed lab  She has not been taking the thyroid in some time  She is told to restart but we will start off low as she has not been taking  [address]  She will see me after thanksgiving as discussed [General Appearance - Alert] : alert [General Appearance - In No Acute Distress] : in no acute distress [Sclera] : the sclera and conjunctiva were normal [PERRL With Normal Accommodation] : pupils were equal in size, round, reactive to light [Neck Appearance] : the appearance of the neck was normal [] : no respiratory distress [FreeTextEntry1] : ramon tilley/l [Heart Rate And Rhythm] : heart rate was normal and rhythm regular [Heart Sounds] : normal S1 and S2 [Heart Sounds Gallop] : no gallops [Murmurs] : no murmurs [Heart Sounds Pericardial Friction Rub] : no pericardial rub [Bowel Sounds] : normal bowel sounds [Abdomen Soft] : soft [No Palpable Adenopathy] : no palpable adenopathy [Skin Lesions] : no skin lesions [No Focal Deficits] : no focal deficits [Oriented To Time, Place, And Person] : oriented to person, place, and time [Affect] : the affect was normal

## 2022-02-22 LAB
RAPID RVP RESULT: NOT DETECTED
SARS-COV-2 RNA PNL RESP NAA+PROBE: NOT DETECTED

## 2022-03-01 ENCOUNTER — NON-APPOINTMENT (OUTPATIENT)
Age: 72
End: 2022-03-01

## 2022-03-04 PROBLEM — I35.1 NONRHEUMATIC AORTIC VALVE INSUFFICIENCY: Status: RESOLVED | Noted: 2019-06-23 | Resolved: 2022-03-04

## 2022-03-04 PROBLEM — I35.1 NONRHEUMATIC AORTIC VALVE INSUFFICIENCY: Status: RESOLVED | Noted: 2018-11-05 | Resolved: 2022-03-04

## 2022-03-04 PROBLEM — I35.1 NONRHEUMATIC AORTIC VALVE INSUFFICIENCY: Status: RESOLVED | Noted: 2020-02-13 | Resolved: 2022-03-04

## 2022-03-04 PROBLEM — I35.1 NONRHEUMATIC AORTIC VALVE INSUFFICIENCY: Status: RESOLVED | Noted: 2019-03-14 | Resolved: 2022-03-04

## 2022-03-04 NOTE — COUNSELING
[Fall prevention counseling provided] : Fall prevention counseling provided [AUDIT-C Screening administered and reviewed] : AUDIT-C Screening administered and reviewed [Encouraged to increase physical activity] : Encouraged to increase physical activity [____ min/wk Activity] : [unfilled] min/wk activity [Good understanding] : Patient has a good understanding of lifestyle changes and steps needed to achieve self management goal

## 2022-03-04 NOTE — ASSESSMENT
[FreeTextEntry1] : 1) HCM - flu vacc already this season . TDAP dicussed - declines for now . PNVX utd, w booster as prevnar 2/21/17, can consider pnvx 23 once more . Shingrix encouraged . SBE, SB, SD, sunblock, exercise/wt loss strategy discussed.  Mammo utd . Ca/D intake reminded, bone density to be discussed - not sure one was done in system, none showing in chart . Colon screen nicely utd, one TA x 1, can consider last check 3-5 yrs after that, s/p colostomy after resection for anal malignancy, also s/p radiation to area . PAPs - reports always negative in past, not active since . Has had multiple labs w H/O, etc.  At next blood draw needs A1C, lipids.  2) DM - last A1C 7.5, target would be 7.5 or less, on steroids at times.  On humalog only as her coverage, otherwise diet controlling.  Sees antonio, can take care of her DM if she'd like going forward.  Reminded ophtho/retinal annually, self foot inspection and microalb annually.  If these are not updated by next visit, will take over for her.  On low dose rosuva.  3) on AC for a fib, CHADS score.  Appears well compensated, not in failure; on rate control agent.  4) pulm/ID follow for bronchiectasis/obstructive lung dz, infection tendency.  5) 6 mos fu.  Today was mostly to familiarize.  \par

## 2022-03-04 NOTE — PHYSICAL EXAM
[No Acute Distress] : no acute distress [Well Nourished] : well nourished [Well Developed] : well developed [Well-Appearing] : well-appearing [Normal Sclera/Conjunctiva] : normal sclera/conjunctiva [PERRL] : pupils equal round and reactive to light [EOMI] : extraocular movements intact [Normal Outer Ear/Nose] : the outer ears and nose were normal in appearance [Normal Oropharynx] : the oropharynx was normal [Normal TMs] : both tympanic membranes were normal [Normal Nasal Mucosa] : the nasal mucosa was normal [No JVD] : no jugular venous distention [No Lymphadenopathy] : no lymphadenopathy [Supple] : supple [No Respiratory Distress] : no respiratory distress  [Thyroid Normal, No Nodules] : the thyroid was normal and there were no nodules present [No Accessory Muscle Use] : no accessory muscle use [Clear to Auscultation] : lungs were clear to auscultation bilaterally [Normal Percussion] : the chest was normal to percussion [Normal Rate] : normal rate  [Regular Rhythm] : with a regular rhythm [Normal S1, S2] : normal S1 and S2 [No Murmur] : no murmur heard [No Carotid Bruits] : no carotid bruits [No Abdominal Bruit] : a ~M bruit was not heard ~T in the abdomen [No Varicosities] : no varicosities [Pedal Pulses Present] : the pedal pulses are present [No Edema] : there was no peripheral edema [No Palpable Aorta] : no palpable aorta [No Extremity Clubbing/Cyanosis] : no extremity clubbing/cyanosis [Soft] : abdomen soft [Non Tender] : non-tender [Non-distended] : non-distended [No Masses] : no abdominal mass palpated [No HSM] : no HSM [Normal Bowel Sounds] : normal bowel sounds [Normal Posterior Cervical Nodes] : no posterior cervical lymphadenopathy [Normal Anterior Cervical Nodes] : no anterior cervical lymphadenopathy [No CVA Tenderness] : no CVA  tenderness [No Spinal Tenderness] : no spinal tenderness [No Joint Swelling] : no joint swelling [Grossly Normal Strength/Tone] : grossly normal strength/tone [No Rash] : no rash [Coordination Grossly Intact] : coordination grossly intact [No Focal Deficits] : no focal deficits [Normal Gait] : normal gait [Deep Tendon Reflexes (DTR)] : deep tendon reflexes were 2+ and symmetric [Normal Affect] : the affect was normal [Normal Insight/Judgement] : insight and judgment were intact

## 2022-03-04 NOTE — HEALTH RISK ASSESSMENT
[Good] : ~his/her~  mood as  good [Never] : Never [No] : In the past 12 months have you used drugs other than those required for medical reasons? No [No falls in past year] : Patient reported no falls in the past year [0] : 2) Feeling down, depressed, or hopeless: Not at all (0) [PHQ-2 Negative - No further assessment needed] : PHQ-2 Negative - No further assessment needed [Audit-CScore] : 0 [[id number]] : 0 [Patient reported mammogram was normal] : Patient reported mammogram was normal [Patient reported colonoscopy was normal] : Patient reported colonoscopy was normal [None] : None [With Family] : lives with family [High School] : high school [Fully functional (bathing, dressing, toileting, transferring, walking, feeding)] : Fully functional (bathing, dressing, toileting, transferring, walking, feeding) [Fully functional (using the telephone, shopping, preparing meals, housekeeping, doing laundry, using] : Fully functional and needs no help or supervision to perform IADLs (using the telephone, shopping, preparing meals, housekeeping, doing laundry, using transportation, managing medications and managing finances) [Reports changes in hearing] : Reports no changes in hearing [Reports changes in vision] : Reports no changes in vision [Reports normal functional visual acuity (ie: able to read med bottle)] : Reports normal functional visual acuity [Reports changes in dental health] : Reports no changes in dental health [Smoke Detector] : smoke detector [Carbon Monoxide Detector] : carbon monoxide detector [Guns at Home] : no guns at home [Safety elements used in home] : safety elements used in home [Seat Belt] :  uses seat belt [Sunscreen] : uses sunscreen [Travel to Developing Areas] : does not  travel to developing areas [TB Exposure] : is not being exposed to tuberculosis [Caregiver Concerns] : does not have caregiver concerns [MammogramDate] : 09/21 [ColonoscopyDate] : 10/21 [ColonoscopyComments] : TA x 1 [de-identified] : lives with sister, volunteers [Name: ___] : Health Care Proxy's Name: [unfilled]  [Relationship: ___] : Relationship: [unfilled]

## 2022-03-04 NOTE — HISTORY OF PRESENT ILLNESS
[FreeTextEntry1] : Here to establish/for [facility] wellness [de-identified] : Here to establish, and to learn her complicated med hx as her new PCP.  Has hx of a fib, asthma/COPD on prednisone, HTN, HLD, bronchiectasis/upper resp infxns, bigeminy/PVCs on mexitil, reflux, DM, IgA MGUS/MM, tracheomalacia s/p tracheoplasty, SCC of anal canal, neuropathy.  \par \par She has been feeling generally well - has trouble with mucus production/clearance/cough/sob.  On multiple therapies w Dr Allison/pulm, also uses vest device to help clear secretions.  has avoided COVID, vaccinated x 3.  Latest sputum isolates at times of mucus/syx have been acetinobacter, MRSA and klebsiella.  Sees ID as well for help with her bronchiectasis.

## 2022-03-08 ENCOUNTER — NON-APPOINTMENT (OUTPATIENT)
Age: 72
End: 2022-03-08

## 2022-03-09 ENCOUNTER — APPOINTMENT (OUTPATIENT)
Dept: DERMATOLOGY | Facility: CLINIC | Age: 72
End: 2022-03-09

## 2022-03-11 ENCOUNTER — OUTPATIENT (OUTPATIENT)
Dept: OUTPATIENT SERVICES | Facility: HOSPITAL | Age: 72
LOS: 1 days | Discharge: HOME | End: 2022-03-11

## 2022-03-11 ENCOUNTER — NON-APPOINTMENT (OUTPATIENT)
Age: 72
End: 2022-03-11

## 2022-03-11 DIAGNOSIS — Z98.89 OTHER SPECIFIED POSTPROCEDURAL STATES: Chronic | ICD-10-CM

## 2022-03-11 DIAGNOSIS — Z87.09 PERSONAL HISTORY OF OTHER DISEASES OF THE RESPIRATORY SYSTEM: Chronic | ICD-10-CM

## 2022-03-11 DIAGNOSIS — Z96.653 PRESENCE OF ARTIFICIAL KNEE JOINT, BILATERAL: Chronic | ICD-10-CM

## 2022-03-11 DIAGNOSIS — C18.9 MALIGNANT NEOPLASM OF COLON, UNSPECIFIED: ICD-10-CM

## 2022-03-11 DIAGNOSIS — Z98.890 OTHER SPECIFIED POSTPROCEDURAL STATES: Chronic | ICD-10-CM

## 2022-03-11 DIAGNOSIS — H05.352 EXOSTOSIS OF LEFT ORBIT: Chronic | ICD-10-CM

## 2022-03-11 DIAGNOSIS — K62.5 HEMORRHAGE OF ANUS AND RECTUM: Chronic | ICD-10-CM

## 2022-03-14 ENCOUNTER — APPOINTMENT (OUTPATIENT)
Dept: PULMONOLOGY | Facility: CLINIC | Age: 72
End: 2022-03-14

## 2022-03-14 ENCOUNTER — APPOINTMENT (OUTPATIENT)
Dept: HEMATOLOGY ONCOLOGY | Facility: CLINIC | Age: 72
End: 2022-03-14

## 2022-03-15 ENCOUNTER — NON-APPOINTMENT (OUTPATIENT)
Age: 72
End: 2022-03-15

## 2022-03-28 ENCOUNTER — NON-APPOINTMENT (OUTPATIENT)
Age: 72
End: 2022-03-28

## 2022-03-28 ENCOUNTER — INPATIENT (INPATIENT)
Facility: HOSPITAL | Age: 72
LOS: 9 days | Discharge: NURSING, REHAB OR LONG-TERM CARE | DRG: 871 | End: 2022-04-07
Attending: STUDENT IN AN ORGANIZED HEALTH CARE EDUCATION/TRAINING PROGRAM | Admitting: STUDENT IN AN ORGANIZED HEALTH CARE EDUCATION/TRAINING PROGRAM
Payer: MEDICARE

## 2022-03-28 VITALS
WEIGHT: 160.06 LBS | SYSTOLIC BLOOD PRESSURE: 128 MMHG | DIASTOLIC BLOOD PRESSURE: 68 MMHG | OXYGEN SATURATION: 92 % | HEART RATE: 115 BPM | TEMPERATURE: 98 F | HEIGHT: 66 IN | RESPIRATION RATE: 17 BRPM

## 2022-03-28 DIAGNOSIS — R11.2 NAUSEA WITH VOMITING, UNSPECIFIED: ICD-10-CM

## 2022-03-28 DIAGNOSIS — Z98.89 OTHER SPECIFIED POSTPROCEDURAL STATES: Chronic | ICD-10-CM

## 2022-03-28 DIAGNOSIS — Z96.653 PRESENCE OF ARTIFICIAL KNEE JOINT, BILATERAL: Chronic | ICD-10-CM

## 2022-03-28 DIAGNOSIS — A41.9 SEPSIS, UNSPECIFIED ORGANISM: ICD-10-CM

## 2022-03-28 DIAGNOSIS — C19 MALIGNANT NEOPLASM OF RECTOSIGMOID JUNCTION: ICD-10-CM

## 2022-03-28 DIAGNOSIS — J96.01 ACUTE RESPIRATORY FAILURE WITH HYPOXIA: ICD-10-CM

## 2022-03-28 DIAGNOSIS — I48.0 PAROXYSMAL ATRIAL FIBRILLATION: ICD-10-CM

## 2022-03-28 DIAGNOSIS — K62.5 HEMORRHAGE OF ANUS AND RECTUM: Chronic | ICD-10-CM

## 2022-03-28 DIAGNOSIS — Z87.09 PERSONAL HISTORY OF OTHER DISEASES OF THE RESPIRATORY SYSTEM: Chronic | ICD-10-CM

## 2022-03-28 DIAGNOSIS — Z98.890 OTHER SPECIFIED POSTPROCEDURAL STATES: Chronic | ICD-10-CM

## 2022-03-28 DIAGNOSIS — E11.9 TYPE 2 DIABETES MELLITUS WITHOUT COMPLICATIONS: ICD-10-CM

## 2022-03-28 DIAGNOSIS — H05.352 EXOSTOSIS OF LEFT ORBIT: Chronic | ICD-10-CM

## 2022-03-28 DIAGNOSIS — J47.9 BRONCHIECTASIS, UNCOMPLICATED: ICD-10-CM

## 2022-03-28 DIAGNOSIS — Z00.00 ENCOUNTER FOR GENERAL ADULT MEDICAL EXAMINATION WITHOUT ABNORMAL FINDINGS: ICD-10-CM

## 2022-03-28 DIAGNOSIS — E27.40 UNSPECIFIED ADRENOCORTICAL INSUFFICIENCY: ICD-10-CM

## 2022-03-28 LAB
ALBUMIN SERPL ELPH-MCNC: 3.1 G/DL — LOW (ref 3.3–5)
ALP SERPL-CCNC: 116 U/L — SIGNIFICANT CHANGE UP (ref 40–120)
ALT FLD-CCNC: 42 U/L — SIGNIFICANT CHANGE UP (ref 10–45)
ANION GAP SERPL CALC-SCNC: 14 MMOL/L — SIGNIFICANT CHANGE UP (ref 5–17)
ANISOCYTOSIS BLD QL: SLIGHT — SIGNIFICANT CHANGE UP
APPEARANCE UR: ABNORMAL
AST SERPL-CCNC: 21 U/L — SIGNIFICANT CHANGE UP (ref 10–40)
BACTERIA # UR AUTO: ABNORMAL
BASE EXCESS BLDV CALC-SCNC: 7.1 MMOL/L — HIGH (ref -2–2)
BASOPHILS # BLD AUTO: 0.09 K/UL — SIGNIFICANT CHANGE UP (ref 0–0.2)
BASOPHILS NFR BLD AUTO: 0.9 % — SIGNIFICANT CHANGE UP (ref 0–2)
BILIRUB SERPL-MCNC: 0.5 MG/DL — SIGNIFICANT CHANGE UP (ref 0.2–1.2)
BILIRUB UR-MCNC: NEGATIVE — SIGNIFICANT CHANGE UP
BUN SERPL-MCNC: 15 MG/DL — SIGNIFICANT CHANGE UP (ref 7–23)
CA-I SERPL-SCNC: 1.08 MMOL/L — LOW (ref 1.15–1.33)
CALCIUM SERPL-MCNC: 8.7 MG/DL — SIGNIFICANT CHANGE UP (ref 8.4–10.5)
CHLORIDE BLDV-SCNC: 100 MMOL/L — SIGNIFICANT CHANGE UP (ref 96–108)
CHLORIDE SERPL-SCNC: 99 MMOL/L — SIGNIFICANT CHANGE UP (ref 96–108)
CO2 BLDV-SCNC: 33 MMOL/L — HIGH (ref 22–26)
CO2 SERPL-SCNC: 25 MMOL/L — SIGNIFICANT CHANGE UP (ref 22–31)
COLOR SPEC: YELLOW — SIGNIFICANT CHANGE UP
COMMENT - URINE: SIGNIFICANT CHANGE UP
CREAT SERPL-MCNC: 0.54 MG/DL — SIGNIFICANT CHANGE UP (ref 0.5–1.3)
DIFF PNL FLD: ABNORMAL
EGFR: 97 ML/MIN/1.73M2 — SIGNIFICANT CHANGE UP
ELLIPTOCYTES BLD QL SMEAR: SLIGHT — SIGNIFICANT CHANGE UP
EOSINOPHIL # BLD AUTO: 0 K/UL — SIGNIFICANT CHANGE UP (ref 0–0.5)
EOSINOPHIL NFR BLD AUTO: 0 % — SIGNIFICANT CHANGE UP (ref 0–6)
EPI CELLS # UR: 1 /HPF — SIGNIFICANT CHANGE UP
GAS PNL BLDV: 133 MMOL/L — LOW (ref 136–145)
GAS PNL BLDV: SIGNIFICANT CHANGE UP
GAS PNL BLDV: SIGNIFICANT CHANGE UP
GLUCOSE BLDV-MCNC: 94 MG/DL — SIGNIFICANT CHANGE UP (ref 70–99)
GLUCOSE SERPL-MCNC: 98 MG/DL — SIGNIFICANT CHANGE UP (ref 70–99)
GLUCOSE UR QL: NEGATIVE — SIGNIFICANT CHANGE UP
HCO3 BLDV-SCNC: 32 MMOL/L — HIGH (ref 22–29)
HCT VFR BLD CALC: 41.9 % — SIGNIFICANT CHANGE UP (ref 34.5–45)
HCT VFR BLDA CALC: 41 % — SIGNIFICANT CHANGE UP (ref 34.5–46.5)
HGB BLD CALC-MCNC: 13.6 G/DL — SIGNIFICANT CHANGE UP (ref 11.7–16.1)
HGB BLD-MCNC: 12.8 G/DL — SIGNIFICANT CHANGE UP (ref 11.5–15.5)
HYALINE CASTS # UR AUTO: 2 /LPF — SIGNIFICANT CHANGE UP (ref 0–2)
KETONES UR-MCNC: ABNORMAL
LACTATE BLDV-MCNC: 2.1 MMOL/L — HIGH (ref 0.7–2)
LEUKOCYTE ESTERASE UR-ACNC: ABNORMAL
LYMPHOCYTES # BLD AUTO: 0.79 K/UL — LOW (ref 1–3.3)
LYMPHOCYTES # BLD AUTO: 8.1 % — LOW (ref 13–44)
MANUAL SMEAR VERIFICATION: SIGNIFICANT CHANGE UP
MCHC RBC-ENTMCNC: 22.3 PG — LOW (ref 27–34)
MCHC RBC-ENTMCNC: 30.5 GM/DL — LOW (ref 32–36)
MCV RBC AUTO: 73.1 FL — LOW (ref 80–100)
MICROCYTES BLD QL: SLIGHT — SIGNIFICANT CHANGE UP
MONOCYTES # BLD AUTO: 0.44 K/UL — SIGNIFICANT CHANGE UP (ref 0–0.9)
MONOCYTES NFR BLD AUTO: 4.5 % — SIGNIFICANT CHANGE UP (ref 2–14)
NEUTROPHILS # BLD AUTO: 8.26 K/UL — HIGH (ref 1.8–7.4)
NEUTROPHILS NFR BLD AUTO: 81.1 % — HIGH (ref 43–77)
NEUTS BAND # BLD: 3.6 % — SIGNIFICANT CHANGE UP (ref 0–8)
NITRITE UR-MCNC: NEGATIVE — SIGNIFICANT CHANGE UP
NRBC # BLD: 5 /100 — HIGH (ref 0–0)
PCO2 BLDV: 45 MMHG — HIGH (ref 39–42)
PH BLDV: 7.46 — HIGH (ref 7.32–7.43)
PH UR: 8 — SIGNIFICANT CHANGE UP (ref 5–8)
PLAT MORPH BLD: NORMAL — SIGNIFICANT CHANGE UP
PLATELET # BLD AUTO: 153 K/UL — SIGNIFICANT CHANGE UP (ref 150–400)
PO2 BLDV: 26 MMHG — SIGNIFICANT CHANGE UP (ref 25–45)
POIKILOCYTOSIS BLD QL AUTO: SLIGHT — SIGNIFICANT CHANGE UP
POLYCHROMASIA BLD QL SMEAR: SLIGHT — SIGNIFICANT CHANGE UP
POTASSIUM BLDV-SCNC: 5 MMOL/L — SIGNIFICANT CHANGE UP (ref 3.5–5.1)
POTASSIUM SERPL-MCNC: 3.6 MMOL/L — SIGNIFICANT CHANGE UP (ref 3.5–5.3)
POTASSIUM SERPL-SCNC: 3.6 MMOL/L — SIGNIFICANT CHANGE UP (ref 3.5–5.3)
PROT SERPL-MCNC: 6.4 G/DL — SIGNIFICANT CHANGE UP (ref 6–8.3)
PROT UR-MCNC: 100 — SIGNIFICANT CHANGE UP
RAPID RVP RESULT: SIGNIFICANT CHANGE UP
RAPID RVP RESULT: SIGNIFICANT CHANGE UP
RBC # BLD: 5.73 M/UL — HIGH (ref 3.8–5.2)
RBC # FLD: 19.8 % — HIGH (ref 10.3–14.5)
RBC BLD AUTO: ABNORMAL
RBC CASTS # UR COMP ASSIST: 26 /HPF — HIGH (ref 0–4)
SAO2 % BLDV: 40.5 % — LOW (ref 67–88)
SARS-COV-2 RNA SPEC QL NAA+PROBE: SIGNIFICANT CHANGE UP
SARS-COV-2 RNA SPEC QL NAA+PROBE: SIGNIFICANT CHANGE UP
SODIUM SERPL-SCNC: 138 MMOL/L — SIGNIFICANT CHANGE UP (ref 135–145)
SP GR SPEC: 1.02 — SIGNIFICANT CHANGE UP (ref 1.01–1.02)
TARGETS BLD QL SMEAR: SLIGHT — SIGNIFICANT CHANGE UP
UROBILINOGEN FLD QL: ABNORMAL
VARIANT LYMPHS # BLD: 1.8 % — SIGNIFICANT CHANGE UP (ref 0–6)
WBC # BLD: 9.75 K/UL — SIGNIFICANT CHANGE UP (ref 3.8–10.5)
WBC # FLD AUTO: 9.75 K/UL — SIGNIFICANT CHANGE UP (ref 3.8–10.5)
WBC UR QL: 197 /HPF — HIGH (ref 0–5)

## 2022-03-28 PROCEDURE — 71045 X-RAY EXAM CHEST 1 VIEW: CPT | Mod: 26

## 2022-03-28 PROCEDURE — 99291 CRITICAL CARE FIRST HOUR: CPT | Mod: CS,GC

## 2022-03-28 PROCEDURE — 99223 1ST HOSP IP/OBS HIGH 75: CPT | Mod: GC

## 2022-03-28 PROCEDURE — 71250 CT THORAX DX C-: CPT | Mod: 26

## 2022-03-28 RX ORDER — ATORVASTATIN CALCIUM 80 MG/1
20 TABLET, FILM COATED ORAL AT BEDTIME
Refills: 0 | Status: DISCONTINUED | OUTPATIENT
Start: 2022-03-28 | End: 2022-04-07

## 2022-03-28 RX ORDER — ACETAMINOPHEN 500 MG
1000 TABLET ORAL ONCE
Refills: 0 | Status: COMPLETED | OUTPATIENT
Start: 2022-03-28 | End: 2022-03-28

## 2022-03-28 RX ORDER — CEFEPIME 1 G/1
2000 INJECTION, POWDER, FOR SOLUTION INTRAMUSCULAR; INTRAVENOUS ONCE
Refills: 0 | Status: COMPLETED | OUTPATIENT
Start: 2022-03-28 | End: 2022-03-28

## 2022-03-28 RX ORDER — MEXILETINE HYDROCHLORIDE 150 MG/1
200 CAPSULE ORAL THREE TIMES A DAY
Refills: 0 | Status: DISCONTINUED | OUTPATIENT
Start: 2022-03-28 | End: 2022-04-07

## 2022-03-28 RX ORDER — DEXTROSE 50 % IN WATER 50 %
25 SYRINGE (ML) INTRAVENOUS ONCE
Refills: 0 | Status: DISCONTINUED | OUTPATIENT
Start: 2022-03-28 | End: 2022-04-07

## 2022-03-28 RX ORDER — DEXTROSE 50 % IN WATER 50 %
15 SYRINGE (ML) INTRAVENOUS ONCE
Refills: 0 | Status: DISCONTINUED | OUTPATIENT
Start: 2022-03-28 | End: 2022-04-07

## 2022-03-28 RX ORDER — TIOTROPIUM BROMIDE 18 UG/1
1 CAPSULE ORAL; RESPIRATORY (INHALATION) DAILY
Refills: 0 | Status: DISCONTINUED | OUTPATIENT
Start: 2022-03-28 | End: 2022-03-29

## 2022-03-28 RX ORDER — VANCOMYCIN HCL 1 G
1000 VIAL (EA) INTRAVENOUS
Refills: 0 | Status: DISCONTINUED | OUTPATIENT
Start: 2022-03-29 | End: 2022-04-01

## 2022-03-28 RX ORDER — GLUCAGON INJECTION, SOLUTION 0.5 MG/.1ML
1 INJECTION, SOLUTION SUBCUTANEOUS ONCE
Refills: 0 | Status: DISCONTINUED | OUTPATIENT
Start: 2022-03-28 | End: 2022-04-07

## 2022-03-28 RX ORDER — CEFEPIME 1 G/1
2000 INJECTION, POWDER, FOR SOLUTION INTRAMUSCULAR; INTRAVENOUS EVERY 8 HOURS
Refills: 0 | Status: DISCONTINUED | OUTPATIENT
Start: 2022-03-28 | End: 2022-04-03

## 2022-03-28 RX ORDER — INSULIN LISPRO 100/ML
VIAL (ML) SUBCUTANEOUS AT BEDTIME
Refills: 0 | Status: DISCONTINUED | OUTPATIENT
Start: 2022-03-28 | End: 2022-04-07

## 2022-03-28 RX ORDER — APIXABAN 2.5 MG/1
5 TABLET, FILM COATED ORAL EVERY 12 HOURS
Refills: 0 | Status: DISCONTINUED | OUTPATIENT
Start: 2022-03-28 | End: 2022-04-07

## 2022-03-28 RX ORDER — FLUTICASONE FUROATE AND VILANTEROL TRIFENATATE 100; 25 UG/1; UG/1
1 POWDER RESPIRATORY (INHALATION)
Qty: 0 | Refills: 0 | DISCHARGE

## 2022-03-28 RX ORDER — SODIUM CHLORIDE 9 MG/ML
500 INJECTION INTRAMUSCULAR; INTRAVENOUS; SUBCUTANEOUS ONCE
Refills: 0 | Status: COMPLETED | OUTPATIENT
Start: 2022-03-28 | End: 2022-03-28

## 2022-03-28 RX ORDER — INSULIN LISPRO 100/ML
VIAL (ML) SUBCUTANEOUS
Refills: 0 | Status: DISCONTINUED | OUTPATIENT
Start: 2022-03-28 | End: 2022-04-07

## 2022-03-28 RX ORDER — MONTELUKAST 4 MG/1
1 TABLET, CHEWABLE ORAL
Qty: 0 | Refills: 0 | DISCHARGE

## 2022-03-28 RX ORDER — POLYETHYLENE GLYCOL 3350 17 G/17G
17 POWDER, FOR SOLUTION ORAL
Qty: 0 | Refills: 0 | DISCHARGE

## 2022-03-28 RX ORDER — DEXTROSE 50 % IN WATER 50 %
12.5 SYRINGE (ML) INTRAVENOUS ONCE
Refills: 0 | Status: DISCONTINUED | OUTPATIENT
Start: 2022-03-28 | End: 2022-04-07

## 2022-03-28 RX ORDER — SODIUM CHLORIDE 9 MG/ML
1000 INJECTION, SOLUTION INTRAVENOUS
Refills: 0 | Status: DISCONTINUED | OUTPATIENT
Start: 2022-03-28 | End: 2022-04-07

## 2022-03-28 RX ORDER — ONDANSETRON 8 MG/1
1 TABLET, FILM COATED ORAL
Qty: 0 | Refills: 0 | DISCHARGE

## 2022-03-28 RX ORDER — SODIUM CHLORIDE 9 MG/ML
4 INJECTION INTRAMUSCULAR; INTRAVENOUS; SUBCUTANEOUS EVERY 12 HOURS
Refills: 0 | Status: DISCONTINUED | OUTPATIENT
Start: 2022-03-28 | End: 2022-04-07

## 2022-03-28 RX ORDER — SENNA PLUS 8.6 MG/1
1 TABLET ORAL
Qty: 0 | Refills: 0 | DISCHARGE

## 2022-03-28 RX ORDER — INSULIN GLARGINE 100 [IU]/ML
12 INJECTION, SOLUTION SUBCUTANEOUS
Qty: 0 | Refills: 0 | DISCHARGE

## 2022-03-28 RX ORDER — ONDANSETRON 8 MG/1
4 TABLET, FILM COATED ORAL EVERY 8 HOURS
Refills: 0 | Status: DISCONTINUED | OUTPATIENT
Start: 2022-03-28 | End: 2022-04-07

## 2022-03-28 RX ORDER — IPRATROPIUM/ALBUTEROL SULFATE 18-103MCG
3 AEROSOL WITH ADAPTER (GRAM) INHALATION EVERY 6 HOURS
Refills: 0 | Status: DISCONTINUED | OUTPATIENT
Start: 2022-03-28 | End: 2022-04-07

## 2022-03-28 RX ORDER — VANCOMYCIN HCL 1 G
1000 VIAL (EA) INTRAVENOUS ONCE
Refills: 0 | Status: COMPLETED | OUTPATIENT
Start: 2022-03-28 | End: 2022-03-28

## 2022-03-28 RX ORDER — LIRAGLUTIDE 6 MG/ML
1.8 INJECTION SUBCUTANEOUS
Qty: 0 | Refills: 0 | DISCHARGE

## 2022-03-28 RX ORDER — INSULIN GLARGINE 100 [IU]/ML
10 INJECTION, SOLUTION SUBCUTANEOUS AT BEDTIME
Refills: 0 | Status: DISCONTINUED | OUTPATIENT
Start: 2022-03-28 | End: 2022-04-02

## 2022-03-28 RX ORDER — ACETAMINOPHEN 500 MG
975 TABLET ORAL EVERY 6 HOURS
Refills: 0 | Status: DISCONTINUED | OUTPATIENT
Start: 2022-03-28 | End: 2022-04-07

## 2022-03-28 RX ADMIN — SODIUM CHLORIDE 500 MILLILITER(S): 9 INJECTION INTRAMUSCULAR; INTRAVENOUS; SUBCUTANEOUS at 17:16

## 2022-03-28 RX ADMIN — Medication 400 MILLIGRAM(S): at 17:17

## 2022-03-28 RX ADMIN — Medication 250 MILLIGRAM(S): at 19:59

## 2022-03-28 RX ADMIN — CEFEPIME 100 MILLIGRAM(S): 1 INJECTION, POWDER, FOR SOLUTION INTRAMUSCULAR; INTRAVENOUS at 18:58

## 2022-03-28 RX ADMIN — Medication 300 UNIT(S): at 17:48

## 2022-03-28 RX ADMIN — Medication 40 MILLIGRAM(S): at 17:17

## 2022-03-28 NOTE — H&P ADULT - PROBLEM SELECTOR PLAN 4
Likely exacerbation in setting of sepsis. S/p tracheoplasty with residual frequent mucus production  - Patient's daughter called Dr. Allison to make him aware of admission, will see patient in AM  - Cont home steroids for severe persistent asthma and adrenal insufficency  - Pt uses nebulizer treatment at home, duonebs q6h standing  - Cont Spiriva  - Incentive spirometry Likely exacerbation in setting of sepsis. S/p tracheoplasty with residual frequent mucus production  - Patient's daughter called Dr. Allison to make him aware of admission, will see patient in AM  - Cont IV antibiotics with Pseudomonal coverage for likely acute exacerbation  - Cont home steroids for severe persistent asthma and adrenal insufficiency  - Pt uses nebulizer treatment at home, duonebs q6h standing  - Cont Spiriva  - Incentive spirometry

## 2022-03-28 NOTE — H&P ADULT - PROBLEM SELECTOR PLAN 3
Multiple episodes of vomiting. Pt reported constipation at rehab and was on lactulose with resolution of constipation. Differential includes C diff given recent Abx use and hospital stay however abdominal exam benign  - Check GI PCR  - Hold C diff testing unless abdominal exam becomes concerning or persistent diarrhea Multiple episodes of vomiting. Pt reported constipation at rehab and was on lactulose with resolution of constipation. Differential includes C diff given recent Abx use and hospital stay however abdominal exam benign. Possibly in setting of gastroparesis  - Hold C diff testing unless abdominal exam becomes concerning or persistent diarrhea

## 2022-03-28 NOTE — H&P ADULT - ASSESSMENT
71F with history of Tracheobronchomalacia s/p Tracheobronchoplasty, Bronchiectasis, Severe Allergic Asthma, Adrenal Insuffiencey, DM2, Colorectal cancer s/p total colectomy now with colostomy, PAF on Eliquis admitted with severe sepsis possibly from pneumonia and concomitant bronchiectasis exacerbation. 71F with history of Tracheobronchomalacia s/p Tracheobronchoplasty, Bronchiectasis, Severe Allergic Asthma, Adrenal Insuffiencey, DM2, Colorectal cancer s/p total colectomy now with colostomy, PAF on Eliquis admitted with severe sepsis and acute hypoxic respiratory failure possibly from pneumonia and concomitant bronchiectasis exacerbation.

## 2022-03-28 NOTE — H&P ADULT - NSHPSOCIALHISTORY_GEN_ALL_CORE
No toxic habits, never smoker. Lives with sister who helps take care of her. Used to have home health aide but not right now. Independent with feeding, bathing, clothing. Needs assistance when ambulating.

## 2022-03-28 NOTE — ED PROVIDER NOTE - OBJECTIVE STATEMENT
71F with history of Tracheobronchomalasia s/p Tracheobronchoplasty, Bronchiectasis, PVCs/ bigeminy, Severe Allergic Asthma, Adrenal Insuffiencey, DM2, HTN, Colorectal cancer s/p total colectomy now with colostomy, PAF on Eliquis comes to ED for vomiting. Pt discharged from [facility] on Friday for pna requiring intubation 2/2 her COPD causing retention, at rehab for last 2 days, general unwell feeling, vomiting bilious emesis. Fever at rehab 100.4. Per daughter, pt normally more responsive now more lethargic. Denies cp, abdominal pain, change in ostomy output.

## 2022-03-28 NOTE — CHART NOTE - NSCHARTNOTEFT_GEN_A_CORE
Called at 4:55pm for STEMI  Evaluated patient at bedside 5:00pm   EKG: sinus tach with , TWI in septal leads, J point elevated in lead 3. No ST/T wave changes concerning for STEMI    71F with history of Tracheobronchomalasia s/p Tracheobronchoplasty, Bronchiectasis, Adrenal Insuffiencey, DM2, HTN, Colorectal cancer s/p total colectomy now with colostomy, PAF on Eliquis comes to ED for vomiting. Pt discharged from [facility] on Friday for pna requiring intubation 2/2 her COPD causing retention, at rehab for last 2 days, general unwell feeling, vomiting bilious emesis. Fever at rehab 100.4.   In the ED, fever to 103, denies any chest pain. Daughter notes patient has never complained of any chest pain.    No urgent need for LHC at this time. Clinical history and EKG is not consistent with STEMI

## 2022-03-28 NOTE — H&P ADULT - ATTENDING COMMENTS
Patient with noted history as above. She has significant respiratory disease of both upper airway and lung parenchyma. Recently admitted to [facility] with respiratory pathology. Here with fever and productive cough. Initially concerned for STEMI on admission, but per cardiology J point elevation. Patient with chronic respiratory failure. On exam with wheezing. Patient otherwise poor historian    VS reviewed;  hypoxic 90s, on 6 L    Labs personally reviewed   UA 197W    Possibly pneumonia and/or bronchiectasis exacerbation. Start broad spectrum antibiotics with duonebs and mucous mobilizing measures such as chest PT and hypertonic saline. Pulm consult with Keegan. Monitor GI issues/complaints. Follow up cultures and MRSA PCR

## 2022-03-28 NOTE — H&P ADULT - NSHPPHYSICALEXAM_GEN_ALL_CORE
GENERAL: NAD, lying in bed comfortably  HEAD:  Atraumatic, normocephalic  EYES: EOMI, PERRLA, conjunctiva and sclera clear  ENT: Dry mucous membranes, oral thrush  NECK: Supple, no JVD  HEART: Tachycardia, +systolic murmur at aortic area, regular rhythm  LUNGS: Unlabored respirations.  Diffuse mild wheezes  ABDOMEN: Soft, nontender, nondistended, +BS. Ostomy with brown solid output  EXTREMITIES: 2+ peripheral pulses bilaterally. Painful palpation of legs diffusely  NERVOUS SYSTEM:  A&Ox3 Vital Signs Last 24 Hrs  T(C): 36.7 (28 Mar 2022 18:30), Max: 39.6 (28 Mar 2022 16:28)  T(F): 98.1 (28 Mar 2022 18:30), Max: 103.2 (28 Mar 2022 16:28)  HR: 105 (28 Mar 2022 20:23) (105 - 115)  BP: 125/63 (28 Mar 2022 20:23) (119/57 - 142/58)  BP(mean): --  RR: 25 (28 Mar 2022 20:23) (16 - 30)  SpO2: 92% (28 Mar 2022 20:23) (92% - 96%)    GENERAL: NAD, lying in bed comfortably  HEAD:  Atraumatic, normocephalic  EYES: EOMI, PERRLA, conjunctiva and sclera clear  ENT: Dry mucous membranes, oral thrush  NECK: Supple, no JVD  HEART: Tachycardia, +systolic murmur at aortic area, regular rhythm  LUNGS: Unlabored respirations.  Diffuse mild wheezes  ABDOMEN: Soft, nontender, nondistended, +BS. Ostomy with brown solid output  EXTREMITIES: 2+ peripheral pulses bilaterally. Painful palpation of legs diffusely  NERVOUS SYSTEM:  A&Ox3

## 2022-03-28 NOTE — H&P ADULT - NSHPREVIEWOFSYSTEMS_GEN_ALL_CORE
REVIEW OF SYSTEMS:    CONSTITUTIONAL: +Fever, weakness  EYES/ENT: No visual changes;  No vertigo or throat pain   MOUTH: +Dry mouth  NECK: No pain or stiffness  RESPIRATORY: +Productive cough, SOB  CARDIOVASCULAR: No chest pain or palpitations  GASTROINTESTINAL: +Nausea, vomiting, increase in ostomy output  GENITOURINARY: No dysuria, frequency or hematuria  NEUROLOGICAL: No numbness or weakness  SKIN: +Sores on buttocks, sores on ankles  PSYCH: no confusion or altered mental status

## 2022-03-28 NOTE — H&P ADULT - PROBLEM SELECTOR PLAN 6
Last A1c 7.8. Pt uses lantus 15 u qhs and Novolog varying dose depending on sugar, however was unable to elaborate on a usual dose of Novolog. Has been eating pureed diet due to weakness since last hospital stay  - Lantus 10 u qhs  - Low-dose ISS  - Monitor off prandial insulin until PO intake established

## 2022-03-28 NOTE — ED PROVIDER NOTE - PROGRESS NOTE DETAILS
LUISITO Nguyen (PGY-2) - pt w/ labs w/o acute abnormalities, cxr limited given rotation, ekg w/ inferior elevations and I reciprocal depression, cards does not believe this to be STEMI given absence of cp and in setting of infection. Will admit telemetry to hosp./

## 2022-03-28 NOTE — CHART NOTE - NSCHARTNOTEFT_GEN_A_CORE
The Drug Utilization Report below displays all of the controlled substance prescriptions, if any, that your patient has filled in the last twelve months. The information displayed on this report is compiled from pharmacy submissions to the Department, and accurately reflects the information as submitted by the pharmacies.    This report was requested by: Spike Aguilar | Reference #: [id number]    Others' Prescriptions  Patient Name: Shirley BloomBirth Date: 1950  Address: [address] [place] [id number]: Female  Rx Written	Rx Dispensed	Drug	Quantity	Days Supply	Prescriber Name	Prescriber Mckayla #	Payment Method	Dispenser  03/03/2022	03/03/2022	pregabalin 150 mg capsule	60	30	Curt Camara	[id number]	Medicare	[facility] [id number]

## 2022-03-28 NOTE — H&P ADULT - PROBLEM SELECTOR PLAN 8
2018, s/p total colectomy and chemotherapy. Last CT Feb 2022 without evidence of metastatic disease or pathologic adenopathy  - CTM

## 2022-03-28 NOTE — ED ADULT NURSE REASSESSMENT NOTE - NS ED NURSE REASSESS COMMENT FT1
Pt placed on nonrebreather, desat to 80% on 6L NC. Pt tolerating at this time O2 sat 96%. Pt straight cathed with aseptic technique, urine obtained and sent to lab. Pt placed on nonrebreather, desat to 80% on 6L NC. Pt tolerating at this time O2 sat 96%. MD made aware. Pt straight cathed with aseptic technique, urine obtained and sent to lab.

## 2022-03-28 NOTE — ED ADULT NURSE REASSESSMENT NOTE - NS ED NURSE REASSESS COMMENT FT1
Pt placed back on NC 6L, O2 sat 92%, MD aware and ok to leave on NC as per MD Jarvis Nguyen. RCW port flushed with heparin and removed as per orders.

## 2022-03-28 NOTE — ED PROVIDER NOTE - ATTENDING CONTRIBUTION TO CARE
72 yo female complex PMH as noted including recent [facility] admission now 2 days post d/c with generalized weakness, increasing SOB, fever.  hypoxic on exam, to low-mid 90s on 6L nasal cannula, lethargic but responds to simple questions, family at bedside.  EKG concerning for inferior ST elevation w/ reciprocal changes, cards consulted and @ the bedside. check labs, UA, CXR, COVID swab, antibiotics, admit for further management.

## 2022-03-28 NOTE — H&P ADULT - NSHPLABSRESULTS_GEN_ALL_CORE
LABS:  cret                        12.8   9.75  )-----------( 153      ( 28 Mar 2022 17:25 )             41.9     03-28    138  |  99  |  15  ----------------------------<  98  3.6   |  25  |  0.54    Ca    8.7      28 Mar 2022 17:25    TPro  6.4  /  Alb  3.1<L>  /  TBili  0.5  /  DBili  x   /  AST  21  /  ALT  42  /  AlkPhos  116  03-28      < from: Xray Chest 1 View- PORTABLE-Urgent (03.28.22 @ 16:53) >    ACC: [id number] EXAM:  XR CHEST PORTABLE URGENT 1V                          PROCEDURE DATE:  03/28/2022    ******PRELIMINARY REPORT******      ******PRELIMINARY REPORT******           INTERPRETATION:  CLINICAL INDICATION: Sepsis.    EXAM: Frontal radiograph of the chest.    COMPARISON: Chest radiograph from 12/20/2021. CT chest abdomen and pelvis   2/9/2022.    FINDINGS:  Study somewhat limited by patient rotation.  Right chest wall port with tip in the SVC.  No new focal airspace opacity. Suggestion of similar right lower lung   nodular opacity measuring 1.6 cm.  There is no pleural effusion or pneumothorax.  The heart size is not well evaluated on this projection.  Thevisualized osseous structures demonstrate no acute pathology.    IMPRESSION:  Study somewhat limited by patient rotation, no significant interval   change when compared to prior study 12/20/2021.    < end of copied text >

## 2022-03-28 NOTE — H&P ADULT - PROBLEM SELECTOR PLAN 1
Febrile, tachycardia, tachypnea, lactate 2.1. Associated AHRF, productive cough. Recent hospital stay with intubation for pneumonia at OSH, unclear which antibiotics were given and duration of treatment. Likely in setting of pneumonia and bronchiectasis exacerbation. History of sputum Cx +Pseudomonas (Feb 2020) via bronchoscopy  - S/p cefepime and vancomycin in ED, 500 cc bolus  - Cont cefepime for Pseudomonas coverage and vancomycin given recent intubation  - BCx, UCx pending  - Sputum Cx  - MRSA PCR  - RVP ordered  - Consider ID consult Febrile, tachycardia, tachypnea, lactate 2.1. Associated AHRF, productive cough. Recent hospital stay with intubation for pneumonia at OSH and intubated, unclear which antibiotics were given and duration of treatment. Likely in setting of pneumonia and bronchiectasis exacerbation. History of sputum Cx +Pseudomonas (Feb 2020) via bronchoscopy  - S/p cefepime and vancomycin in ED, 500 cc bolus  - Cont cefepime for Pseudomonas coverage and vancomycin given recent intubation  - BCx, UCx pending  - Sputum Cx  - MRSA PCR  - RVP negative  - Consider ID consult Febrile, tachycardia, tachypnea, lactate 2.1. Associated AHRF, productive cough. Recent hospital stay with intubation for pneumonia at OSH and intubated, unclear which antibiotics were given and duration of treatment. Likely in setting of pneumonia and bronchiectasis exacerbation. History of sputum Cx +Pseudomonas (Feb 2020) via bronchoscopy. Also with UA grossly positive, however pt without symptoms  - S/p cefepime and vancomycin in ED, 500 cc bolus  - Cont cefepime for Pseudomonas coverage and vancomycin given recent intubation  - BCx, UCx pending  - Sputum Cx  - MRSA PCR  - RVP negative, repeat pending  - Consider ID consult

## 2022-03-28 NOTE — H&P ADULT - HISTORY OF PRESENT ILLNESS
71 year old woman with history of tracheobronchomalasia s/p tracheobronchoplasty, bronchiectasis, severe persistent asthma (steroid dependent), adrenal insuffiencey on chronic steroids, DM2, HTN, colorectal cancer s/p total colectomy now with colostomy, PAF on Eliquis presenting with about 3 weeks of productive cough, bilous vomiting, shortness of breath and lethargy. Pt was originally admitted to [facility] on March 8th and treated for pna, given antibiotics, stay c/b intubation 2/2 hypoxia, and discharged on Friday March 25th with oxygen back to rehab (required 3-5L NC, no O2 requirement at baseline). For past 2 days at rehab, pt with worsening SOB, productive cough with white/yellow phlegm, episodes of bilious emesis, increasing lethargy, and febrile (Tmax 105F per daughter). Pt denies headache, vision changes, chills, abdominal pain, palpitations, dysuria, changes in urination, or bloody stools.    In ED: CBC, CMP unremarkable, VBG: pH 7.46, pCO2 45, bicarb 32, lactate 2.1, urine with large leuk esterase conc, uWBC 197 with yeast-like cells, RVP neg, CXR limited but unremarkable. S/p cefepime, vanocmycin, 500 cc bolus, solu-medrol 40 mg IV.

## 2022-03-28 NOTE — H&P ADULT - NSICDXPASTSURGICALHX_GEN_ALL_CORE_FT
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    H/O pelvic surgery 5 years ago - s/p fracture    H/O total knee replacement, bilateral 5 years ago    History of partial hysterectomy 30 years ago - fibroids    History of sinus surgery multiple sinus surgeries    History of tracheomalacia 2015 - attempted tracheal stenting ([facility])- course complicated by obstruction, respiratory failure, multiple CPR attempts -  stent discontinued; 10/20/2016 Tracheobronchoplasty (Prolene Mesh) performed at [facility] by Dr Zapien    Rectal bleeding exam under anesthesia ([facility]) 2/2018    S/P bronchoscopy 6/5/2018 - [facility] (Dr Zapien) no evidence of tracheobronchomalacia in trachea or bronchial tubes

## 2022-03-28 NOTE — ED PROVIDER NOTE - PHYSICAL EXAMINATION
General: NAD  HEENT: NCAT, PERRL  Cardiac: tachy, irregular, 2+ peripheral pulses  Chest: +course breath sounds  Abdomen: soft, non-distended, bowel sounds present, no ttp, no rebound or guarding  Extremities: no peripheral edema, calf tenderness, or leg size discrepancies  Skin: +stage 1 sacral decub  Neuro: AAOx2, minimally moving extremities  Psych: mood and affect appropriate

## 2022-03-28 NOTE — H&P ADULT - PROBLEM SELECTOR PLAN 2
Multifactorial in setting of pneumonia, bronchiectasis exacerbation, likely asthma exacerbation. PE on differential however she has been on therapeutic AC for afib, patient has contrast to allergy with SOB as reaction. Unclear history of COPD, states her sister smokes and she has secondhand exposure, however COPD diagnosis was never fully established however she does have known severe persistent asthma. Discharged to rehab on O2, unclear how much but not on O2 usually at home.   - Wean O2 as tolerated  - F/u CT chest non-con (contrast allergy)  - Cont methylprednisolone 8 mg qd  - Sputum Cx Multifactorial in setting of pneumonia, bronchiectasis exacerbation, likely asthma exacerbation. PE on differential however she has been on therapeutic AC for afib, patient has contrast to allergy with SOB as reaction. Unclear history of COPD, states her sister smokes and she has secondhand exposure, however COPD diagnosis was never fully established however she does have known severe persistent asthma. Discharged to rehab on O2, unclear how much but not on O2 usually at home.   - Wean O2 as tolerated  - F/u CT chest non-con   - Cont methylprednisolone 8 mg qd  - Sputum Cx

## 2022-03-28 NOTE — ED PROVIDER NOTE - NSICDXPASTSURGICALHX_GEN_ALL_CORE_FT
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    H/O pelvic surgery 5 years ago - s/p fracture    H/O total knee replacement, bilateral 5 years ago    History of partial hysterectomy 30 years ago - fibroids    History of sinus surgery multiple sinus surgeries    History of tracheomalacia 2015 - attempted tracheal stenting ([facility])- course complicated by obstruction, respiratory failure, multiple CPR attempts -  stent discontinued; 10/20/2016 Tracheobronchoplasty (Prolene Mesh) performed at [facility] by Dr Zapien    Rectal bleeding exam under anesthesia ([facility]) 2/2018    S/P bronchoscopy 6/5/2018 - [place] (Dr Zapien) no evidence of tracheobronchomalacia in trachea or bronchial tubes

## 2022-03-28 NOTE — ED ADULT NURSE NOTE - OBJECTIVE STATEMENT
72 y/o female BIBEMS from [facility] for n/v and fever x2 days. Pt a&ox3, recently admitted at [facility], vented and admitted for pneumonia, transferred to [facility] 2 days ago and since then has been vomiting "dark green bile." Pt states "I have trouble breathing." TMAX today 102 orally, no tylenol given as per daughter. Pt arrives, on 3L NC, has been on 3L since discharge. Nares noted to be bleeding bright red blood. Pt arrives with multiple ulcers to pt back. Pt has CW port. As per pt, port placed for colorectal CA, no chemo, radiation, meds given. Pt denies chest pain, cough. MD at bedside for eval. Orders to follow. 72 y/o female BIBEMS from [facility] for n/v and fever x2 days. Pt a&ox3, recently admitted at [facility], vented and admitted for pneumonia, transferred to [facility] 2 days ago and since then has been vomiting "dark green bile." Pt states "I have trouble breathing." TMAX today 102 orally, no tylenol given as per daughter. Pt arrives, on 3L NC, has been on 3L since discharge. Nares noted to be bleeding bright red blood. Pt arrives with multiple Stage 2 wounds to buttocks. Pt has RCW port, flushes with difficulty, no blood return present. As per pt, port placed for colorectal CA, no chemo, radiation, meds given. Colostomy noted to LLQ. Pt denies chest pain, cough. MD at bedside for eval. Orders to follow.

## 2022-03-28 NOTE — ED PROVIDER NOTE - CLINICAL SUMMARY MEDICAL DECISION MAKING FREE TEXT BOX
Palliative Care Chronically ill pt here for general unwellness s/p recent admission at [facility] for pna. Rectally febrile. Concern for pna, will obtain cxr, labs, cardiac markers, ekg. Give abx as part of sepsis bundle. Reassess.

## 2022-03-28 NOTE — H&P ADULT - PROBLEM SELECTOR PLAN 5
Not hypotensive, however low threshold for stress dose steroids if hypotensive given severe sepsis  - Cont home steroids

## 2022-03-29 DIAGNOSIS — R49.8 OTHER VOICE AND RESONANCE DISORDERS: ICD-10-CM

## 2022-03-29 DIAGNOSIS — B37.0 CANDIDAL STOMATITIS: ICD-10-CM

## 2022-03-29 DIAGNOSIS — R49.1 APHONIA: ICD-10-CM

## 2022-03-29 LAB
A1C WITH ESTIMATED AVERAGE GLUCOSE RESULT: 9.5 % — HIGH (ref 4–5.6)
ALBUMIN SERPL ELPH-MCNC: 2.6 G/DL — LOW (ref 3.3–5)
ALP SERPL-CCNC: 96 U/L — SIGNIFICANT CHANGE UP (ref 40–120)
ALT FLD-CCNC: 42 U/L — SIGNIFICANT CHANGE UP (ref 10–45)
ANION GAP SERPL CALC-SCNC: 11 MMOL/L — SIGNIFICANT CHANGE UP (ref 5–17)
ANION GAP SERPL CALC-SCNC: 14 MMOL/L — SIGNIFICANT CHANGE UP (ref 5–17)
AST SERPL-CCNC: 31 U/L — SIGNIFICANT CHANGE UP (ref 10–40)
BASE EXCESS BLDV CALC-SCNC: -0.2 MMOL/L — SIGNIFICANT CHANGE UP (ref -2–2)
BASOPHILS # BLD AUTO: 0.01 K/UL — SIGNIFICANT CHANGE UP (ref 0–0.2)
BASOPHILS NFR BLD AUTO: 0.1 % — SIGNIFICANT CHANGE UP (ref 0–2)
BILIRUB SERPL-MCNC: 0.4 MG/DL — SIGNIFICANT CHANGE UP (ref 0.2–1.2)
BUN SERPL-MCNC: 21 MG/DL — SIGNIFICANT CHANGE UP (ref 7–23)
BUN SERPL-MCNC: 25 MG/DL — HIGH (ref 7–23)
CALCIUM SERPL-MCNC: 8.1 MG/DL — LOW (ref 8.4–10.5)
CALCIUM SERPL-MCNC: 8.7 MG/DL — SIGNIFICANT CHANGE UP (ref 8.4–10.5)
CHLORIDE SERPL-SCNC: 100 MMOL/L — SIGNIFICANT CHANGE UP (ref 96–108)
CHLORIDE SERPL-SCNC: 99 MMOL/L — SIGNIFICANT CHANGE UP (ref 96–108)
CHOLEST SERPL-MCNC: 147 MG/DL — SIGNIFICANT CHANGE UP
CO2 BLDV-SCNC: 27 MMOL/L — HIGH (ref 22–26)
CO2 SERPL-SCNC: 22 MMOL/L — SIGNIFICANT CHANGE UP (ref 22–31)
CO2 SERPL-SCNC: 26 MMOL/L — SIGNIFICANT CHANGE UP (ref 22–31)
CREAT SERPL-MCNC: 0.48 MG/DL — LOW (ref 0.5–1.3)
CREAT SERPL-MCNC: 0.48 MG/DL — LOW (ref 0.5–1.3)
EGFR: 100 ML/MIN/1.73M2 — SIGNIFICANT CHANGE UP
EGFR: 100 ML/MIN/1.73M2 — SIGNIFICANT CHANGE UP
EOSINOPHIL # BLD AUTO: 0 K/UL — SIGNIFICANT CHANGE UP (ref 0–0.5)
EOSINOPHIL NFR BLD AUTO: 0 % — SIGNIFICANT CHANGE UP (ref 0–6)
ESTIMATED AVERAGE GLUCOSE: 226 MG/DL — HIGH (ref 68–114)
GAS PNL BLDV: SIGNIFICANT CHANGE UP
GLUCOSE BLDC GLUCOMTR-MCNC: 237 MG/DL — HIGH (ref 70–99)
GLUCOSE BLDC GLUCOMTR-MCNC: 304 MG/DL — HIGH (ref 70–99)
GLUCOSE BLDC GLUCOMTR-MCNC: 330 MG/DL — HIGH (ref 70–99)
GLUCOSE BLDC GLUCOMTR-MCNC: 350 MG/DL — HIGH (ref 70–99)
GLUCOSE BLDC GLUCOMTR-MCNC: 403 MG/DL — HIGH (ref 70–99)
GLUCOSE SERPL-MCNC: 355 MG/DL — HIGH (ref 70–99)
GLUCOSE SERPL-MCNC: 357 MG/DL — HIGH (ref 70–99)
GRAM STN FLD: SIGNIFICANT CHANGE UP
HCO3 BLDV-SCNC: 25 MMOL/L — SIGNIFICANT CHANGE UP (ref 22–29)
HCT VFR BLD CALC: 35.1 % — SIGNIFICANT CHANGE UP (ref 34.5–45)
HDLC SERPL-MCNC: 40 MG/DL — LOW
HGB BLD-MCNC: 11.4 G/DL — LOW (ref 11.5–15.5)
IMM GRANULOCYTES NFR BLD AUTO: 1.9 % — HIGH (ref 0–1.5)
LACTATE BLDV-MCNC: 1.6 MMOL/L — SIGNIFICANT CHANGE UP (ref 0.7–2)
LIPID PNL WITH DIRECT LDL SERPL: 86 MG/DL — SIGNIFICANT CHANGE UP
LYMPHOCYTES # BLD AUTO: 0.77 K/UL — LOW (ref 1–3.3)
LYMPHOCYTES # BLD AUTO: 8.7 % — LOW (ref 13–44)
MAGNESIUM SERPL-MCNC: 1.9 MG/DL — SIGNIFICANT CHANGE UP (ref 1.6–2.6)
MCHC RBC-ENTMCNC: 23.6 PG — LOW (ref 27–34)
MCHC RBC-ENTMCNC: 32.5 GM/DL — SIGNIFICANT CHANGE UP (ref 32–36)
MCV RBC AUTO: 72.5 FL — LOW (ref 80–100)
MONOCYTES # BLD AUTO: 0.28 K/UL — SIGNIFICANT CHANGE UP (ref 0–0.9)
MONOCYTES NFR BLD AUTO: 3.1 % — SIGNIFICANT CHANGE UP (ref 2–14)
MRSA PCR RESULT.: DETECTED
NEUTROPHILS # BLD AUTO: 7.66 K/UL — HIGH (ref 1.8–7.4)
NEUTROPHILS NFR BLD AUTO: 86.2 % — HIGH (ref 43–77)
NON HDL CHOLESTEROL: 107 MG/DL — SIGNIFICANT CHANGE UP
NRBC # BLD: 0 /100 WBCS — SIGNIFICANT CHANGE UP (ref 0–0)
PCO2 BLDV: 44 MMHG — HIGH (ref 39–42)
PH BLDV: 7.37 — SIGNIFICANT CHANGE UP (ref 7.32–7.43)
PHOSPHATE SERPL-MCNC: 3.1 MG/DL — SIGNIFICANT CHANGE UP (ref 2.5–4.5)
PLATELET # BLD AUTO: 176 K/UL — SIGNIFICANT CHANGE UP (ref 150–400)
PO2 BLDV: 49 MMHG — HIGH (ref 25–45)
POTASSIUM SERPL-MCNC: 4.1 MMOL/L — SIGNIFICANT CHANGE UP (ref 3.5–5.3)
POTASSIUM SERPL-MCNC: 4.8 MMOL/L — SIGNIFICANT CHANGE UP (ref 3.5–5.3)
POTASSIUM SERPL-SCNC: 4.1 MMOL/L — SIGNIFICANT CHANGE UP (ref 3.5–5.3)
POTASSIUM SERPL-SCNC: 4.8 MMOL/L — SIGNIFICANT CHANGE UP (ref 3.5–5.3)
PROT SERPL-MCNC: 5.7 G/DL — LOW (ref 6–8.3)
RBC # BLD: 4.84 M/UL — SIGNIFICANT CHANGE UP (ref 3.8–5.2)
RBC # FLD: 19.3 % — HIGH (ref 10.3–14.5)
S AUREUS DNA NOSE QL NAA+PROBE: DETECTED
SAO2 % BLDV: 81.9 % — SIGNIFICANT CHANGE UP (ref 67–88)
SODIUM SERPL-SCNC: 135 MMOL/L — SIGNIFICANT CHANGE UP (ref 135–145)
SODIUM SERPL-SCNC: 137 MMOL/L — SIGNIFICANT CHANGE UP (ref 135–145)
SPECIMEN SOURCE: SIGNIFICANT CHANGE UP
TRIGL SERPL-MCNC: 105 MG/DL — SIGNIFICANT CHANGE UP
WBC # BLD: 8.89 K/UL — SIGNIFICANT CHANGE UP (ref 3.8–10.5)
WBC # FLD AUTO: 8.89 K/UL — SIGNIFICANT CHANGE UP (ref 3.8–10.5)

## 2022-03-29 PROCEDURE — 31575 DIAGNOSTIC LARYNGOSCOPY: CPT

## 2022-03-29 PROCEDURE — 99232 SBSQ HOSP IP/OBS MODERATE 35: CPT

## 2022-03-29 PROCEDURE — 99233 SBSQ HOSP IP/OBS HIGH 50: CPT | Mod: GC

## 2022-03-29 PROCEDURE — 99222 1ST HOSP IP/OBS MODERATE 55: CPT | Mod: 25

## 2022-03-29 RX ORDER — PANTOPRAZOLE SODIUM 20 MG/1
40 TABLET, DELAYED RELEASE ORAL
Refills: 0 | Status: DISCONTINUED | OUTPATIENT
Start: 2022-03-29 | End: 2022-04-07

## 2022-03-29 RX ORDER — INFLUENZA VIRUS VACCINE 15; 15; 15; 15 UG/.5ML; UG/.5ML; UG/.5ML; UG/.5ML
0.7 SUSPENSION INTRAMUSCULAR ONCE
Refills: 0 | Status: DISCONTINUED | OUTPATIENT
Start: 2022-03-29 | End: 2022-04-07

## 2022-03-29 RX ORDER — MUPIROCIN 20 MG/G
1 OINTMENT TOPICAL
Refills: 0 | Status: COMPLETED | OUTPATIENT
Start: 2022-03-29 | End: 2022-04-03

## 2022-03-29 RX ORDER — CHLORHEXIDINE GLUCONATE 213 G/1000ML
1 SOLUTION TOPICAL
Refills: 0 | Status: DISCONTINUED | OUTPATIENT
Start: 2022-03-29 | End: 2022-04-07

## 2022-03-29 RX ADMIN — CEFEPIME 100 MILLIGRAM(S): 1 INJECTION, POWDER, FOR SOLUTION INTRAMUSCULAR; INTRAVENOUS at 17:37

## 2022-03-29 RX ADMIN — Medication 250 MILLIGRAM(S): at 06:57

## 2022-03-29 RX ADMIN — Medication 3 MILLILITER(S): at 06:43

## 2022-03-29 RX ADMIN — ATORVASTATIN CALCIUM 20 MILLIGRAM(S): 80 TABLET, FILM COATED ORAL at 00:24

## 2022-03-29 RX ADMIN — INSULIN GLARGINE 10 UNIT(S): 100 INJECTION, SOLUTION SUBCUTANEOUS at 00:33

## 2022-03-29 RX ADMIN — Medication 1000 MILLIGRAM(S): at 07:26

## 2022-03-29 RX ADMIN — TIOTROPIUM BROMIDE 1 CAPSULE(S): 18 CAPSULE ORAL; RESPIRATORY (INHALATION) at 12:45

## 2022-03-29 RX ADMIN — Medication 150 MILLIGRAM(S): at 18:55

## 2022-03-29 RX ADMIN — Medication 2: at 21:51

## 2022-03-29 RX ADMIN — Medication 2: at 06:50

## 2022-03-29 RX ADMIN — Medication 8 MILLIGRAM(S): at 06:59

## 2022-03-29 RX ADMIN — MEXILETINE HYDROCHLORIDE 200 MILLIGRAM(S): 150 CAPSULE ORAL at 06:59

## 2022-03-29 RX ADMIN — MEXILETINE HYDROCHLORIDE 200 MILLIGRAM(S): 150 CAPSULE ORAL at 00:24

## 2022-03-29 RX ADMIN — MEXILETINE HYDROCHLORIDE 200 MILLIGRAM(S): 150 CAPSULE ORAL at 22:35

## 2022-03-29 RX ADMIN — ATORVASTATIN CALCIUM 20 MILLIGRAM(S): 80 TABLET, FILM COATED ORAL at 22:35

## 2022-03-29 RX ADMIN — Medication 150 MILLIGRAM(S): at 06:43

## 2022-03-29 RX ADMIN — Medication 4: at 14:14

## 2022-03-29 RX ADMIN — Medication 250 MILLIGRAM(S): at 18:57

## 2022-03-29 RX ADMIN — APIXABAN 5 MILLIGRAM(S): 2.5 TABLET, FILM COATED ORAL at 18:55

## 2022-03-29 RX ADMIN — SODIUM CHLORIDE 4 MILLILITER(S): 9 INJECTION INTRAMUSCULAR; INTRAVENOUS; SUBCUTANEOUS at 18:57

## 2022-03-29 RX ADMIN — INSULIN GLARGINE 10 UNIT(S): 100 INJECTION, SOLUTION SUBCUTANEOUS at 21:51

## 2022-03-29 RX ADMIN — CEFEPIME 100 MILLIGRAM(S): 1 INJECTION, POWDER, FOR SOLUTION INTRAMUSCULAR; INTRAVENOUS at 10:34

## 2022-03-29 RX ADMIN — Medication 3 MILLILITER(S): at 12:45

## 2022-03-29 RX ADMIN — SODIUM CHLORIDE 4 MILLILITER(S): 9 INJECTION INTRAMUSCULAR; INTRAVENOUS; SUBCUTANEOUS at 07:43

## 2022-03-29 RX ADMIN — MEXILETINE HYDROCHLORIDE 200 MILLIGRAM(S): 150 CAPSULE ORAL at 12:45

## 2022-03-29 RX ADMIN — Medication 4 MILLIGRAM(S): at 19:10

## 2022-03-29 RX ADMIN — Medication 3 MILLILITER(S): at 18:55

## 2022-03-29 RX ADMIN — APIXABAN 5 MILLIGRAM(S): 2.5 TABLET, FILM COATED ORAL at 06:43

## 2022-03-29 RX ADMIN — Medication 2: at 18:58

## 2022-03-29 NOTE — ED ADULT NURSE REASSESSMENT NOTE - NS ED NURSE REASSESS COMMENT FT1
Pt asleep but arousable. A&ox4, spontaneous respirations and equal chest rise. VSS on 6L per nasal cannula. Pt given her meds crushed with applesauce. Pt able to tolerate po fluids with no s/s of aspiration. RN repositioned pt and updated her on plan of care. Pt offers no complaints at this time. Will continue to closely monitor pt.

## 2022-03-29 NOTE — CONSULT NOTE ADULT - SUBJECTIVE AND OBJECTIVE BOX
CC:Aphonia    HPI:  72 y/o F w/tracheobronchomalacia s/p tracheobronchoplasty, severe persistent asthma, bronchiectasis, and colon cancer s/p colectomy admitted with likely exacerbation of bronchiectasis due to pneumonia. Pt. was admitted 3 weeks ago to [facility] and was intubated at that time for pneumonia. Pt. since discharge has noticed residual cough and aphonia.          PAST MEDICAL & SURGICAL HISTORY:  Atrial fibrillation  paroxysmal, on eliquis    Diabetes  Type 2    COPD (chronic obstructive pulmonary disease)    Adrenal insufficiency  Medrol daily for over 50 years    Aortic insufficiency  moderate AR on echo 5/3/2018    Pelvic fracture    Asthma    Tracheobronchomalacia  diagnosed 2015, s/p bronchial thermoplasty 2016 (Dr Zapien); recent bronchoscopy 6/5/2018 revealed no evidence of tracheobronchomalacia in trachea or bronchial tubes    Colorectal cancer  4/2018- last treatment , chemo and radiation    Rectal bleeding    Seizure  x 1 1/7/18    DVT (deep venous thrombosis)  15-20 years ago, took coumadin    TIA (transient ischemic attack)  multiple, last 5 years ago - presents as right-sided weakness    History of partial hysterectomy  30 years ago - fibroids    H/O total knee replacement, bilateral  5 years ago    History of sinus surgery  multiple sinus surgeries    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    H/O pelvic surgery  5 years ago - s/p fracture    History of tracheomalacia  2015 - attempted tracheal stenting ([facility])- course complicated by obstruction, respiratory failure, multiple CPR attempts -  stent discontinued; 10/20/2016 Tracheobronchoplasty (Prolene Mesh) performed at [facility] by Dr Zapien    S/P bronchoscopy  6/5/2018 - [place] (Dr Zapien) no evidence of tracheobronchomalacia in trachea or bronchial tubes    Rectal bleeding  exam under anesthesia ([facility]) 2/2018      Allergies    aspirin (Short breath)  Avelox (Short breath; Pruritus)  codeine (Short breath)  Dilaudid (Short breath)  iodine (Short breath; Swelling)  shellfish (Anaphylaxis)  tetanus toxoid (Short breath)  Valium (Short breath)    Intolerances      MEDICATIONS  (STANDING):  albuterol/ipratropium for Nebulization 3 milliLiter(s) Nebulizer every 6 hours  apixaban 5 milliGRAM(s) Oral every 12 hours  atorvastatin 20 milliGRAM(s) Oral at bedtime  cefepime   IVPB 2000 milliGRAM(s) IV Intermittent every 8 hours  dextrose 5%. 1000 milliLiter(s) (50 mL/Hr) IV Continuous <Continuous>  dextrose 5%. 1000 milliLiter(s) (100 mL/Hr) IV Continuous <Continuous>  dextrose 50% Injectable 25 Gram(s) IV Push once  dextrose 50% Injectable 12.5 Gram(s) IV Push once  dextrose 50% Injectable 25 Gram(s) IV Push once  glucagon  Injectable 1 milliGRAM(s) IntraMuscular once  insulin glargine Injectable (LANTUS) 10 Unit(s) SubCutaneous at bedtime  insulin lispro (ADMELOG) corrective regimen sliding scale   SubCutaneous three times a day before meals  insulin lispro (ADMELOG) corrective regimen sliding scale   SubCutaneous at bedtime  methylPREDNISolone 4 milliGRAM(s) Oral daily  mexiletine 200 milliGRAM(s) Oral three times a day  pantoprazole    Tablet 40 milliGRAM(s) Oral before breakfast  pregabalin 150 milliGRAM(s) Oral two times a day  sodium chloride 7% Inhalation 4 milliLiter(s) Inhalation every 12 hours  tiotropium 18 MICROgram(s) Capsule 1 Capsule(s) Inhalation daily  vancomycin  IVPB 1000 milliGRAM(s) IV Intermittent <User Schedule>    MEDICATIONS  (PRN):  acetaminophen     Tablet .. 975 milliGRAM(s) Oral every 6 hours PRN Temp greater or equal to 38C (100.4F), Mild Pain (1 - 3)  dextrose Oral Gel 15 Gram(s) Oral once PRN Blood Glucose LESS THAN 70 milliGRAM(s)/deciliter  ondansetron Injectable 4 milliGRAM(s) IV Push every 8 hours PRN Nausea and/or Vomiting      Social History: Denies history of smoking, has second hand exposure to smoking    Family history:  Asthma, Type 2 DM  ROS:   ENT: all negative except as noted in HPI   CV: denies palpitations  Pulm: denies SOB, cough, hemoptysis  GI: denies change in apetite, indigestion, n/v  : denies pertinent urinary symptoms, urgency  Neuro: denies numbness/tingling, loss of sensation  Psych: denies anxiety  MS: denies muscle weakness, instability  Heme: denies easy bruising or bleeding  Endo: denies heat/cold intolerance, excessive sweating  Vascular: denies LE edema    Vital Signs Last 24 Hrs  T(C): 37 (29 Mar 2022 09:06), Max: 39.6 (28 Mar 2022 16:28)  T(F): 98.6 (29 Mar 2022 09:06), Max: 103.2 (28 Mar 2022 16:28)  HR: 89 (29 Mar 2022 09:06) (89 - 115)  BP: 126/57 (29 Mar 2022 09:06) (119/57 - 142/58)  BP(mean): --  RR: 20 (29 Mar 2022 09:06) (16 - 30)  SpO2: 99% (29 Mar 2022 09:06) (92% - 99%)                          11.4   8.89  )-----------( 176      ( 29 Mar 2022 09:41 )             35.1    03-29    135  |  99  |  21  ----------------------------<  355<H>  4.8   |  22  |  0.48<L>    Ca    8.1<L>      29 Mar 2022 09:41  Phos  3.1     03-29  Mg     1.9     03-29    TPro  5.7<L>  /  Alb  2.6<L>  /  TBili  0.4  /  DBili  x   /  AST  31  /  ALT  42  /  AlkPhos  96  03-29       PHYSICAL EXAM:  Gen: NAD  Skin: No rashes, bruises, or lesions  Head: Normocephalic, Atraumatic  Face: no edema, erythema, or fluctuance. Parotid glands soft without mass  Eyes: no scleral injection  Nose: Nares bilaterally patent, no discharge  Mouth: No Stridor / Drooling / Trismus.  Mucosa moist, tongue/uvula midline, oropharynx clear  Neck: Flat, supple, no lymphadenopathy, trachea midline, no masses  Lymphatic: No lymphadenopathy  Resp: breathing easily, no stridor  CV: no peripheral edema/cyanosis  GI: nondistended   Peripheral vascular: no JVD or edema  Neuro: facial nerve intact, no facial droop            Fiberoptic Indirect laryngoscopy:  (Scope #2 used)        IMAGING/ADDITIONAL STUDIES:  CC:Aphonia    HPI:71 year old woman with history of tracheobronchomalasia s/p tracheobronchoplasty, bronchiectasis, severe persistent asthma (steroid dependent), adrenal insuffiencey on chronic steroids, DM2, HTN, colorectal cancer s/p total colectomy now with colostomy, PAF on Eliquis presenting with about 3 weeks of productive cough, bilous vomiting, shortness of breath and lethargy. Pt was originally admitted to [facility] on March 8th and treated for pna, given antibiotics, stay c/b intubation 2/2 hypoxia, and discharged on Friday March 25th with oxygen back to rehab (required 3-5L NC, no O2 requirement at baseline). For past 2 days at rehab, pt with worsening SOB, productive cough with white/yellow phlegm, episodes of bilious emesis, increasing lethargy, and febrile (Tmax 105F per daughter). Pt denies headache, vision changes, chills, abdominal pain, palpitations, dysuria, changes in urination, or bloody stools. Pt. noticed a change in her voice since extubation and is now unable to speak.                PAST MEDICAL & SURGICAL HISTORY:  Atrial fibrillation  paroxysmal, on eliquis    Diabetes  Type 2    COPD (chronic obstructive pulmonary disease)    Adrenal insufficiency  Medrol daily for over 50 years    Aortic insufficiency  moderate AR on echo 5/3/2018    Pelvic fracture    Asthma    Tracheobronchomalacia  diagnosed 2015, s/p bronchial thermoplasty 2016 (Dr Zapien); recent bronchoscopy 6/5/2018 revealed no evidence of tracheobronchomalacia in trachea or bronchial tubes    Colorectal cancer  4/2018- last treatment , chemo and radiation    Rectal bleeding    Seizure  x 1 1/7/18    DVT (deep venous thrombosis)  15-20 years ago, took coumadin    TIA (transient ischemic attack)  multiple, last 5 years ago - presents as right-sided weakness    History of partial hysterectomy  30 years ago - fibroids    H/O total knee replacement, bilateral  5 years ago    History of sinus surgery  multiple sinus surgeries    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 by Dr Zapien    S/P bronchoscopy  6/5/2018 - [place] (Dr Zapien) no evidence of tracheobronchomalacia in trachea or bronchial tubes    Rectal bleeding  exam under anesthesia ([facility]) 2/2018      Allergies    aspirin (Short breath)  Avelox (Short breath; Pruritus)  codeine (Short breath)  Dilaudid (Short breath)  iodine (Short breath; Swelling)  shellfish (Anaphylaxis)  tetanus toxoid (Short breath)  Valium (Short breath)    Intolerances      MEDICATIONS  (STANDING):  albuterol/ipratropium for Nebulization 3 milliLiter(s) Nebulizer every 6 hours  apixaban 5 milliGRAM(s) Oral every 12 hours  atorvastatin 20 milliGRAM(s) Oral at bedtime  cefepime   IVPB 2000 milliGRAM(s) IV Intermittent every 8 hours  dextrose 5%. 1000 milliLiter(s) (50 mL/Hr) IV Continuous <Continuous>  dextrose 5%. 1000 milliLiter(s) (100 mL/Hr) IV Continuous <Continuous>  dextrose 50% Injectable 25 Gram(s) IV Push once  dextrose 50% Injectable 12.5 Gram(s) IV Push once  dextrose 50% Injectable 25 Gram(s) IV Push once  glucagon  Injectable 1 milliGRAM(s) IntraMuscular once  insulin glargine Injectable (LANTUS) 10 Unit(s) SubCutaneous at bedtime  insulin lispro (ADMELOG) corrective regimen sliding scale   SubCutaneous three times a day before meals  insulin lispro (ADMELOG) corrective regimen sliding scale   SubCutaneous at bedtime  methylPREDNISolone 4 milliGRAM(s) Oral daily  mexiletine 200 milliGRAM(s) Oral three times a day  pantoprazole    Tablet 40 milliGRAM(s) Oral before breakfast  pregabalin 150 milliGRAM(s) Oral two times a day  sodium chloride 7% Inhalation 4 milliLiter(s) Inhalation every 12 hours  tiotropium 18 MICROgram(s) Capsule 1 Capsule(s) Inhalation daily  vancomycin  IVPB 1000 milliGRAM(s) IV Intermittent <User Schedule>    MEDICATIONS  (PRN):  acetaminophen     Tablet .. 975 milliGRAM(s) Oral every 6 hours PRN Temp greater or equal to 38C (100.4F), Mild Pain (1 - 3)  dextrose Oral Gel 15 Gram(s) Oral once PRN Blood Glucose LESS THAN 70 milliGRAM(s)/deciliter  ondansetron Injectable 4 milliGRAM(s) IV Push every 8 hours PRN Nausea and/or Vomiting      Social History: Denies history of smoking, has second hand exposure to smoking    Family history:  Asthma, Type 2 DM  ROS:   ENT: all negative except as noted in HPI   CV: denies palpitations  Pulm: denies SOB, cough, hemoptysis  GI: denies change in apetite, indigestion, n/v  : denies pertinent urinary symptoms, urgency  Neuro: denies numbness/tingling, loss of sensation  Psych: denies anxiety  MS: denies muscle weakness, instability  Heme: denies easy bruising or bleeding  Endo: denies heat/cold intolerance, excessive sweating  Vascular: denies LE edema    Vital Signs Last 24 Hrs  T(C): 37 (29 Mar 2022 09:06), Max: 39.6 (28 Mar 2022 16:28)  T(F): 98.6 (29 Mar 2022 09:06), Max: 103.2 (28 Mar 2022 16:28)  HR: 89 (29 Mar 2022 09:06) (89 - 115)  BP: 126/57 (29 Mar 2022 09:06) (119/57 - 142/58)  BP(mean): --  RR: 20 (29 Mar 2022 09:06) (16 - 30)  SpO2: 99% (29 Mar 2022 09:06) (92% - 99%)                          11.4   8.89  )-----------( 176      ( 29 Mar 2022 09:41 )             35.1    03-29    135  |  99  |  21  ----------------------------<  355<H>  4.8   |  22  |  0.48<L>    Ca    8.1<L>      29 Mar 2022 09:41  Phos  3.1     03-29  Mg     1.9     03-29    TPro  5.7<L>  /  Alb  2.6<L>  /  TBili  0.4  /  DBili  x   /  AST  31  /  ALT  42  /  AlkPhos  96  03-29       PHYSICAL EXAM:  Gen: NAD  Skin: No rashes, bruises, or lesions  Head: Normocephalic, Atraumatic  Face: no edema, erythema, or fluctuance. Parotid glands soft without mass  Eyes: no scleral injection  Nose: Nares bilaterally patent, no discharge  Mouth: No Stridor / Drooling / Trismus.  Mucosa dry, tongue/uvula midline, oropharynx with yellow patches ? thrush   Neck: Flat, supple, no lymphadenopathy, trachea midline, no masses  Lymphatic: No lymphadenopathy  Resp: breathing easily, no stridor  CV: no peripheral edema/cyanosis  GI: nondistended   Peripheral vascular: no JVD or edema  Neuro: facial nerve intact, no facial droop            Fiberoptic Indirect laryngoscopy:  (Scope #2 used)  Reason for Laryngoscopy: hypophonia  Diffuse yellow patches of  debris noted (?thrush)    Patient was unable to cooperate with mirror.  Nasopharynx, oropharynx, and hypopharynx clear, no bleeding. Tongue base, posterior pharyngeal wall, vallecula, epiglottis, and subglottis appear normal. No erythema, edema, pooling of secretions, masses or lesions. Airway patent, no foreign body visualized. No glottic/supraglottic edema. True vocal cords, arytenoids, vestibular folds, ventricles, pyriform sinuses, and aryepiglottic folds appear normal bilaterally. Vocal cords mobile with good contact b/l.              IMAGING/ADDITIONAL STUDIES:  CC:Aphonia    HPI:71 year old woman with history of tracheobronchomalasia s/p tracheobronchoplasty, bronchiectasis, severe persistent asthma (steroid dependent), adrenal insuffiencey on chronic steroids, DM2, HTN, colorectal cancer s/p total colectomy now with colostomy, PAF on Eliquis presenting with about 3 weeks of productive cough, bilous vomiting, shortness of breath and lethargy. Pt was originally admitted to [facility] on March 8th and treated for pna, given antibiotics, stay c/b intubation 2/2 hypoxia, and discharged on Friday March 25th with oxygen back to rehab (required 3-5L NC, no O2 requirement at baseline). For past 2 days at rehab, pt with worsening SOB, productive cough with white/yellow phlegm, episodes of bilious emesis, increasing lethargy, and febrile (Tmax 105F per daughter). Pt denies headache, vision changes, chills, abdominal pain, palpitations, dysuria, changes in urination, or bloody stools. Pt. noticed a change in her voice since extubation and is now unable to speak.                PAST MEDICAL & SURGICAL HISTORY:  Atrial fibrillation  paroxysmal, on eliquis    Diabetes  Type 2    COPD (chronic obstructive pulmonary disease)    Adrenal insufficiency  Medrol daily for over 50 years    Aortic insufficiency  moderate AR on echo 5/3/2018    Pelvic fracture    Asthma    Tracheobronchomalacia  diagnosed 2015, s/p bronchial thermoplasty 2016 (Dr Zapien); recent bronchoscopy 6/5/2018 revealed no evidence of tracheobronchomalacia in trachea or bronchial tubes    Colorectal cancer  4/2018- last treatment , chemo and radiation    Rectal bleeding    Seizure  x 1 1/7/18    DVT (deep venous thrombosis)  15-20 years ago, took coumadin    TIA (transient ischemic attack)  multiple, last 5 years ago - presents as right-sided weakness    History of partial hysterectomy  30 years ago - fibroids    H/O total knee replacement, bilateral  5 years ago    History of sinus surgery  multiple sinus surgeries    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    H/O pelvic surgery  5 years ago - s/p fracture    History of tracheomalacia  2015 - attempted tracheal stenting ([facility])- course complicated by obstruction, respiratory failure, multiple CPR attempts -  stent discontinued; 10/20/2016 Tracheobronchoplasty (Prolene Mesh) performed at [facility] by Dr Zapien    S/P bronchoscopy  6/5/2018 - [place] (Dr Zapien) no evidence of tracheobronchomalacia in trachea or bronchial tubes    Rectal bleeding  exam under anesthesia ([facility]) 2/2018      Allergies    aspirin (Short breath)  Avelox (Short breath; Pruritus)  codeine (Short breath)  Dilaudid (Short breath)  iodine (Short breath; Swelling)  shellfish (Anaphylaxis)  tetanus toxoid (Short breath)  Valium (Short breath)    Intolerances      MEDICATIONS  (STANDING):  albuterol/ipratropium for Nebulization 3 milliLiter(s) Nebulizer every 6 hours  apixaban 5 milliGRAM(s) Oral every 12 hours  atorvastatin 20 milliGRAM(s) Oral at bedtime  cefepime   IVPB 2000 milliGRAM(s) IV Intermittent every 8 hours  dextrose 5%. 1000 milliLiter(s) (50 mL/Hr) IV Continuous <Continuous>  dextrose 5%. 1000 milliLiter(s) (100 mL/Hr) IV Continuous <Continuous>  dextrose 50% Injectable 25 Gram(s) IV Push once  dextrose 50% Injectable 12.5 Gram(s) IV Push once  dextrose 50% Injectable 25 Gram(s) IV Push once  glucagon  Injectable 1 milliGRAM(s) IntraMuscular once  insulin glargine Injectable (LANTUS) 10 Unit(s) SubCutaneous at bedtime  insulin lispro (ADMELOG) corrective regimen sliding scale   SubCutaneous three times a day before meals  insulin lispro (ADMELOG) corrective regimen sliding scale   SubCutaneous at bedtime  methylPREDNISolone 4 milliGRAM(s) Oral daily  mexiletine 200 milliGRAM(s) Oral three times a day  pantoprazole    Tablet 40 milliGRAM(s) Oral before breakfast  pregabalin 150 milliGRAM(s) Oral two times a day  sodium chloride 7% Inhalation 4 milliLiter(s) Inhalation every 12 hours  tiotropium 18 MICROgram(s) Capsule 1 Capsule(s) Inhalation daily  vancomycin  IVPB 1000 milliGRAM(s) IV Intermittent <User Schedule>    MEDICATIONS  (PRN):  acetaminophen     Tablet .. 975 milliGRAM(s) Oral every 6 hours PRN Temp greater or equal to 38C (100.4F), Mild Pain (1 - 3)  dextrose Oral Gel 15 Gram(s) Oral once PRN Blood Glucose LESS THAN 70 milliGRAM(s)/deciliter  ondansetron Injectable 4 milliGRAM(s) IV Push every 8 hours PRN Nausea and/or Vomiting      Social History: Denies history of smoking, has second hand exposure to smoking    Family history:  Asthma, Type 2 DM  ROS:   ENT: all negative except as noted in HPI   CV: denies palpitations  Pulm: denies SOB, cough, hemoptysis  GI: denies change in apetite, indigestion, n/v  : denies pertinent urinary symptoms, urgency  Neuro: denies numbness/tingling, loss of sensation  Psych: denies anxiety  MS: denies muscle weakness, instability  Heme: denies easy bruising or bleeding  Endo: denies heat/cold intolerance, excessive sweating  Vascular: denies LE edema    Vital Signs Last 24 Hrs  T(C): 37 (29 Mar 2022 09:06), Max: 39.6 (28 Mar 2022 16:28)  T(F): 98.6 (29 Mar 2022 09:06), Max: 103.2 (28 Mar 2022 16:28)  HR: 89 (29 Mar 2022 09:06) (89 - 115)  BP: 126/57 (29 Mar 2022 09:06) (119/57 - 142/58)  BP(mean): --  RR: 20 (29 Mar 2022 09:06) (16 - 30)  SpO2: 99% (29 Mar 2022 09:06) (92% - 99%)                          11.4   8.89  )-----------( 176      ( 29 Mar 2022 09:41 )             35.1    03-29    135  |  99  |  21  ----------------------------<  355<H>  4.8   |  22  |  0.48<L>    Ca    8.1<L>      29 Mar 2022 09:41  Phos  3.1     03-29  Mg     1.9     03-29    TPro  5.7<L>  /  Alb  2.6<L>  /  TBili  0.4  /  DBili  x   /  AST  31  /  ALT  42  /  AlkPhos  96  03-29       PHYSICAL EXAM:  Gen: NAD, aphonic  Skin: No rashes, bruises, or lesions  Head: Normocephalic, Atraumatic  Face: no edema, erythema, or fluctuance. Parotid glands soft without mass  Eyes: no scleral injection  Nose: Nares bilaterally patent, no discharge  Mouth: No Stridor / Drooling / Trismus.  Mucosa dry, tongue/uvula midline, oropharynx with yellow patches ? thrush   Neck: Flat, supple, no lymphadenopathy, trachea midline, no masses  Lymphatic: No lymphadenopathy  Resp: breathing easily, no stridor  CV: no peripheral edema/cyanosis  GI: nondistended   Peripheral vascular: no JVD or edema  Neuro: facial nerve intact, no facial droop            Fiberoptic Indirect laryngoscopy:  (Scope #2 used)  Reason for Laryngoscopy: hypophonia  Diffuse yellow patches of  debris noted (?thrush)    Patient was unable to cooperate with mirror.  Nasopharynx, oropharynx, and hypopharynx clear, no bleeding. Tongue base, posterior pharyngeal wall, vallecula, epiglottis, and subglottis appear normal. No erythema, edema, pooling of secretions, masses or lesions. Airway patent, no foreign body visualized. No glottic/supraglottic edema. True vocal cords, arytenoids, vestibular folds, ventricles, pyriform sinuses, and aryepiglottic folds appear normal bilaterally. Vocal cords mobile with good contact b/l.              IMAGING/ADDITIONAL STUDIES:

## 2022-03-29 NOTE — PROGRESS NOTE ADULT - PROBLEM SELECTOR PLAN 2
Multifactorial in setting of pneumonia, bronchiectasis exacerbation, likely asthma exacerbation. PE on differential however she has been on therapeutic AC for afib, patient has contrast to allergy with SOB as reaction. Unclear history of COPD, states her sister smokes and she has secondhand exposure, however COPD diagnosis was never fully established however she does have known severe persistent asthma. Discharged to rehab on O2, unclear how much but not on O2 usually at home.   - Wean O2 as tolerated  - F/u CT chest non-con   - Cont methylprednisolone 8 mg qd  - Sputum Cx Multifactorial in setting of pneumonia, bronchiectasis exacerbation, likely asthma exacerbation. PE on differential however she has been on therapeutic AC for afib, patient has contrast to allergy with SOB as reaction. Unclear history of COPD, states her sister smokes and she has secondhand exposure, however COPD diagnosis was never fully established however she does have known severe persistent asthma. Discharged to rehab on O2, unclear how much but not on O2 usually at home.   - CT chest 3/29: worsening extensive bronchial impaction and tree in bud nodularity with new right lower lobe consolidation iso bronchiectasis. diffuse bronchial mucoid impaction  - Wean O2 as tolerated  - decreased methylprednisolone 4 mg qd (from 8 mg daily 3/29)  - Sputum Cx, will induce with hypertonic sal  - aggressive pulmonary toilet hygiene: chest vest, acapella, incentive spirometry, duonebs q6, hypertonic sal q12  - pulmonary Dr. Allison following

## 2022-03-29 NOTE — PROGRESS NOTE ADULT - TIME BILLING
as above:  improved-  multifactorial dyspnea-TBM, CB, severe persistent asthma, PNA, debility, anemia, CAD--O2 NC sat above 90%  PNA-f/up sputum cx=MURF--s/p cefepime D8 of ?7  (ID formal Ketan Liz)  MBD-SB-ezomstjq/vest rx, incentive spirometry  asthma-medrol 4mg, spiriva/symbicort, duoneb q 6, singulair 10 q hs,  allergy-claritin/flonase  gerd-ppi  abnormal CT-nodule-f/up 3 months                    cards-on mult rx-to continue  PT-OOB    DVT prophylaxis  DC planning today w/ office f/up 2-3 weeks    Eulalio Allison MD-Pulmonary   [phone]. as above:  no signif changes o/n  multifactorial dyspnea-TBM, CB, severe persistent asthma, ? PNA, ? VC dysfunction, debility, anemia, CAD--O2 NC sat above 90%  ?PNA/bronchiectasis-f/up sputum cx-prior pseudomonas- (ID formal evaln-RISHABH Liz/Girish et al)-on cefepime/vanco D2  TBM-CB/brochiectasis-acapella/vest rx, incentive spirometry  asthma-medrol 8mg, spiriva/symbicort, duoneb q 6, singulair 10 q hs,  allergy-claritin/flonase                                                            *****dysphonia/VC dysfunction-? paralysis--ENT evaln/swallow evaln as well  gerd-ppi  abnormal CT-nodule-f/up 3 months                    cards-on mult rx-to continue  PT-OOB    DVT prophylaxis  DC planning-will need extensive rehab    Eulalio Allison MD-Pulmonary   [phone].

## 2022-03-29 NOTE — PROGRESS NOTE ADULT - PROBLEM SELECTOR PLAN 4
Likely exacerbation in setting of sepsis. S/p tracheoplasty with residual frequent mucus production  - Patient's daughter called Dr. Allison to make him aware of admission, will see patient in AM  - Cont IV antibiotics with Pseudomonal coverage for likely acute exacerbation  - Cont home steroids for severe persistent asthma and adrenal insufficiency  - Pt uses nebulizer treatment at home, duonebs q6h standing  - Cont Spiriva  - Incentive spirometry

## 2022-03-29 NOTE — PROGRESS NOTE ADULT - SUBJECTIVE AND OBJECTIVE BOX
PROGRESS NOTE:   Authored by Raissa Nguyen MD  Internal Medicine      Patient is a 71y old  Female who presents with a chief complaint of sepsis (29 Mar 2022 06:06)      SUBJECTIVE / OVERNIGHT EVENTS: NAEO, patient seen and examined at bedside    MEDICATIONS  (STANDING):  albuterol/ipratropium for Nebulization 3 milliLiter(s) Nebulizer every 6 hours  apixaban 5 milliGRAM(s) Oral every 12 hours  atorvastatin 20 milliGRAM(s) Oral at bedtime  cefepime   IVPB 2000 milliGRAM(s) IV Intermittent every 8 hours  dextrose 5%. 1000 milliLiter(s) (50 mL/Hr) IV Continuous <Continuous>  dextrose 5%. 1000 milliLiter(s) (100 mL/Hr) IV Continuous <Continuous>  dextrose 50% Injectable 25 Gram(s) IV Push once  dextrose 50% Injectable 12.5 Gram(s) IV Push once  dextrose 50% Injectable 25 Gram(s) IV Push once  glucagon  Injectable 1 milliGRAM(s) IntraMuscular once  insulin glargine Injectable (LANTUS) 10 Unit(s) SubCutaneous at bedtime  insulin lispro (ADMELOG) corrective regimen sliding scale   SubCutaneous three times a day before meals  insulin lispro (ADMELOG) corrective regimen sliding scale   SubCutaneous at bedtime  methylPREDNISolone 8 milliGRAM(s) Oral daily  mexiletine 200 milliGRAM(s) Oral three times a day  pregabalin 150 milliGRAM(s) Oral two times a day  sodium chloride 7% Inhalation 4 milliLiter(s) Inhalation every 12 hours  tiotropium 18 MICROgram(s) Capsule 1 Capsule(s) Inhalation daily  vancomycin  IVPB 1000 milliGRAM(s) IV Intermittent <User Schedule>    MEDICATIONS  (PRN):  acetaminophen     Tablet .. 975 milliGRAM(s) Oral every 6 hours PRN Temp greater or equal to 38C (100.4F), Mild Pain (1 - 3)  dextrose Oral Gel 15 Gram(s) Oral once PRN Blood Glucose LESS THAN 70 milliGRAM(s)/deciliter  ondansetron Injectable 4 milliGRAM(s) IV Push every 8 hours PRN Nausea and/or Vomiting      CAPILLARY BLOOD GLUCOSE      POCT Blood Glucose.: 208 mg/dL (29 Mar 2022 06:43)  POCT Blood Glucose.: 184 mg/dL (28 Mar 2022 23:45)    I&O's Summary      PHYSICAL EXAM:  Vital Signs Last 24 Hrs  T(C): 36.7 (28 Mar 2022 18:30), Max: 39.6 (28 Mar 2022 16:28)  T(F): 98.1 (28 Mar 2022 18:30), Max: 103.2 (28 Mar 2022 16:28)  HR: 92 (29 Mar 2022 05:00) (92 - 115)  BP: 137/91 (29 Mar 2022 05:00) (119/57 - 142/58)  BP(mean): --  RR: 20 (29 Mar 2022 05:00) (16 - 30)  SpO2: 97% (29 Mar 2022 05:00) (92% - 99%)  CONSTITUTIONAL: Well-groomed, in no apparent distress  EYES: No conjunctival or scleral injection, non-icteric;   ENMT: No external nasal lesions; MMM  NECK: Trachea midline without palpable neck mass; thyroid not enlarged and non-tender  RESPIRATORY: Breathing comfortably; no dullness to percussion; lungs CTA without wheeze/rhonchi/rales  CARDIOVASCULAR: +S1S2, RRR, no M/G/R; pedal pulses full and symmetric; no lower extremity edema  GASTROINTESTINAL: No palpable masses or tenderness, +BS throughout, no rebound/guarding; no hepatosplenomegaly; no hernia palpated  LYMPHATIC: No cervical LAD or tenderness  SKIN: No rashes or ulcers noted  NEUROLOGIC: CN II-XII intact; sensation intact in LEs b/l to light touch  PSYCHIATRIC: A+O x 3; mood and affect appropriate; appropriate insight and judgment    LABS:                        12.8   9.75  )-----------( 153      ( 28 Mar 2022 17:25 )             41.9     03-28    138  |  99  |  15  ----------------------------<  98  3.6   |  25  |  0.54    Ca    8.7      28 Mar 2022 17:25    TPro  6.4  /  Alb  3.1<L>  /  TBili  0.5  /  DBili  x   /  AST  21  /  ALT  42  /  AlkPhos  116  03-          Urinalysis Basic - ( 28 Mar 2022 18:13 )    Color: Yellow / Appearance: Slightly Turbid / S.025 / pH: x  Gluc: x / Ketone: Small  / Bili: Negative / Urobili: 2 mg/dL   Blood: x / Protein: 100 / Nitrite: Negative   Leuk Esterase: Large / RBC: 26 /hpf /  /HPF   Sq Epi: x / Non Sq Epi: 1 /hpf / Bacteria: Few          RADIOLOGY & ADDITIONAL TESTS:  Results Reviewed:   Imaging Personally Reviewed:  Electrocardiogram Personally Reviewed:    COORDINATION OF CARE:  Care Discussed with Consultants/Other Providers [Y/N]:  Prior or Outpatient Records Reviewed [Y/N]:   PROGRESS NOTE:   Authored by Raissa Nguyen MD  Internal Medicine      Patient is a 71y old  Female who presents with a chief complaint of sepsis (29 Mar 2022 06:06)      SUBJECTIVE / OVERNIGHT EVENTS: NAEO, patient seen and examined at bedside. patient reports that her symptoms of cough, shortness of breath are now improved compared to admission. Reports productive cough, yellow phelgm with blood specked sputum, thinks its fresh blood.    MEDICATIONS  (STANDING):  albuterol/ipratropium for Nebulization 3 milliLiter(s) Nebulizer every 6 hours  apixaban 5 milliGRAM(s) Oral every 12 hours  atorvastatin 20 milliGRAM(s) Oral at bedtime  cefepime   IVPB 2000 milliGRAM(s) IV Intermittent every 8 hours  dextrose 5%. 1000 milliLiter(s) (50 mL/Hr) IV Continuous <Continuous>  dextrose 5%. 1000 milliLiter(s) (100 mL/Hr) IV Continuous <Continuous>  dextrose 50% Injectable 25 Gram(s) IV Push once  dextrose 50% Injectable 12.5 Gram(s) IV Push once  dextrose 50% Injectable 25 Gram(s) IV Push once  glucagon  Injectable 1 milliGRAM(s) IntraMuscular once  insulin glargine Injectable (LANTUS) 10 Unit(s) SubCutaneous at bedtime  insulin lispro (ADMELOG) corrective regimen sliding scale   SubCutaneous three times a day before meals  insulin lispro (ADMELOG) corrective regimen sliding scale   SubCutaneous at bedtime  methylPREDNISolone 8 milliGRAM(s) Oral daily  mexiletine 200 milliGRAM(s) Oral three times a day  pregabalin 150 milliGRAM(s) Oral two times a day  sodium chloride 7% Inhalation 4 milliLiter(s) Inhalation every 12 hours  tiotropium 18 MICROgram(s) Capsule 1 Capsule(s) Inhalation daily  vancomycin  IVPB 1000 milliGRAM(s) IV Intermittent <User Schedule>    MEDICATIONS  (PRN):  acetaminophen     Tablet .. 975 milliGRAM(s) Oral every 6 hours PRN Temp greater or equal to 38C (100.4F), Mild Pain (1 - 3)  dextrose Oral Gel 15 Gram(s) Oral once PRN Blood Glucose LESS THAN 70 milliGRAM(s)/deciliter  ondansetron Injectable 4 milliGRAM(s) IV Push every 8 hours PRN Nausea and/or Vomiting      CAPILLARY BLOOD GLUCOSE      POCT Blood Glucose.: 208 mg/dL (29 Mar 2022 06:43)  POCT Blood Glucose.: 184 mg/dL (28 Mar 2022 23:45)    I&O's Summary      PHYSICAL EXAM:  Vital Signs Last 24 Hrs  T(C): 36.7 (28 Mar 2022 18:30), Max: 39.6 (28 Mar 2022 16:28)  T(F): 98.1 (28 Mar 2022 18:30), Max: 103.2 (28 Mar 2022 16:28)  HR: 92 (29 Mar 2022 05:00) (92 - 115)  BP: 137/91 (29 Mar 2022 05:00) (119/57 - 142/58)  BP(mean): --  RR: 20 (29 Mar 2022 05:00) (16 - 30)  SpO2: 97% (29 Mar 2022 05:00) (92% - 99%)  CONSTITUTIONAL: fatigued, hypophonic, appears chronically ill.   EYES: No conjunctival or scleral injection, non-icteric;   ENMT: No external nasal lesions; MMM, hypophonic  NECK: Trachea midline without palpable neck mass; thyroid not enlarged and non-tender  RESPIRATORY: no accessory mm use, able to speak in complete sentences, diminished bibasilar lung sounds, +crackles  CARDIOVASCULAR: +S1S2, RRR, no M/G/R; pedal pulses full and symmetric; no lower extremity edema  GASTROINTESTINAL: No palpable masses or tenderness, +BS throughout, colostomy in place, healthy, draining stool  LYMPHATIC: No cervical LAD or tenderness  SKIN: No rashes or ulcers noted  NEUROLOGIC: answers all questions appropriately, FCx4, able to squeeze hands and wiggle toes, unable to extend arms in front of her  PSYCHIATRIC: A+O x 4; mood and affect appropriate; appropriate insight and judgment    LABS:                        12.8   9.75  )-----------( 153      ( 28 Mar 2022 17:25 )             41.9     03-28    138  |  99  |  15  ----------------------------<  98  3.6   |  25  |  0.54    Ca    8.7      28 Mar 2022 17:25    TPro  6.4  /  Alb  3.1<L>  /  TBili  0.5  /  DBili  x   /  AST  21  /  ALT  42  /  AlkPhos  116            Urinalysis Basic - ( 28 Mar 2022 18:13 )    Color: Yellow / Appearance: Slightly Turbid / S.025 / pH: x  Gluc: x / Ketone: Small  / Bili: Negative / Urobili: 2 mg/dL   Blood: x / Protein: 100 / Nitrite: Negative   Leuk Esterase: Large / RBC: 26 /hpf /  /HPF   Sq Epi: x / Non Sq Epi: 1 /hpf / Bacteria: Few          RADIOLOGY & ADDITIONAL TESTS:  Results Reviewed:   Imaging Personally Reviewed:  Electrocardiogram Personally Reviewed:    COORDINATION OF CARE:  Care Discussed with Consultants/Other Providers [Y/N]:  Prior or Outpatient Records Reviewed [Y/N]:

## 2022-03-29 NOTE — PATIENT PROFILE ADULT - DO YOU FEEL UNSAFE AT HOME, WORK, OR SCHOOL?
[address] [facility] [address], [address] [facility]          Pre-procedure History and Physical       NAME:  Ruthie Machuca   :   1952   MRN:   [id number]     CHIEF COMPLAINT/HPI: See procedure note    PMH:  Past Medical History:   Diagnosis Date    Ill-defined condition     heart murmur - in heart valve per pt    Ill-defined condition     gestational diabetes    Ill-defined condition     bloating       PSH:  Past Surgical History:   Procedure Laterality Date    HX GI      last colonoscopy move than 6 yrs ago    HX GYN             Allergies:  No Known Allergies    Home Medications:  Prior to Admission Medications   Prescriptions Last Dose Informant Patient Reported? Taking? MULTIVITAMIN PO 2017  Yes Yes   Sig: Take  by mouth. Takes two po once daily. OTHER 8/15/2017  Yes Yes   Sig: TBX - Tobacco free nicotine replacement.  Sublingual.      Facility-Administered Medications: None       Hospital Medications:  Current Facility-Administered Medications   Medication Dose Route Frequency    0.9% sodium chloride infusion  150 mL/hr IntraVENous CONTINUOUS    sodium chloride (NS) flush 5-10 mL  5-10 mL IntraVENous Q8H    sodium chloride (NS) flush 5-10 mL  5-10 mL IntraVENous PRN    midazolam (VERSED) injection 0.25-5 mg  0.25-5 mg IntraVENous Multiple    simethicone (MYLICON) 98IC/9.1EP oral drops 80 mg  1.2 mL Oral Multiple    atropine injection 0.5 mg  0.5 mg IntraVENous ONCE PRN    EPINEPHrine (ADRENALIN) 0.1 mg/mL syringe 1 mg  1 mg Endoscopically ONCE PRN     Facility-Administered Medications Ordered in Other Encounters   Medication Dose Route Frequency    0.9% sodium chloride infusion   IntraVENous CONTINUOUS       Family History:  Family History   Problem Relation Age of Onset    Colon Cancer Mother     Heart Disease Father     Diabetes Maternal Grandmother        Social History:  Social History   Substance Use Topics    Smoking status: Current Every Day Smoker    Smokeless tobacco: Never Used    Alcohol use No         PHYSICAL EXAM PRIOR TO SEDATION:  General: Alert, in no acute distress    Lungs:            CTA bilaterally  Heart:  Normal S1, S2    Abdomen: Soft, Non distended, Non tender. Normoactive bowel sounds. Assessment:   Stable for sedation administration.   Date of last colonoscopy: 7 yrs, Polyps  No    Plan:   · Endoscopic procedure with sedation     Signed By: Charles Ortega MD     8/18/2017  9:19 AM no

## 2022-03-29 NOTE — PROGRESS NOTE ADULT - ASSESSMENT
71F with history of Tracheobronchomalacia s/p Tracheobronchoplasty, Bronchiectasis, Severe Allergic Asthma, Adrenal Insuffiencey, DM2, Colorectal cancer s/p total colectomy now with colostomy, PAF on Eliquis admitted with severe sepsis and acute hypoxic respiratory failure possibly from pneumonia and concomitant bronchiectasis exacerbation.

## 2022-03-29 NOTE — PHYSICAL THERAPY INITIAL EVALUATION ADULT - BALANCE TRAINING, PT EVAL
GOAL: Patient will improve sitting balance to good to improve stability while out of bed in 2 weeks.

## 2022-03-29 NOTE — PROGRESS NOTE ADULT - ASSESSMENT
72 y/o F w/tracheobronchomalacia s/p tracheobronchoplasty, severe persistent asthma, bronchiectasis, and colon cancer s/p colectomy admitted with likely exacerbation of bronchiectasis due to pneumonia.    - Supplemental O2 as needed goal O2 sat >= 90%  - Broad spectrum abx including pseudomonal coverage  - Airway clearance, acapella device, bronchodilators, chest PT  - Continue home asthma regimen  - Repeat CT scan in 3 months for follow up of new pulmonary nodule  70 y/o F w/tracheobronchomalacia s/p tracheobronchoplasty, severe persistent asthma, bronchiectasis, and colon cancer s/p colectomy admitted with likely exacerbation of bronchiectasis due to pneumonia.    - Supplemental O2 as needed goal O2 sat >= 90%  - Broad spectrum abx including pseudomonal coverage  - Airway clearance, acapella device, bronchodilators, chest PT  - Continue home asthma regimen  - Repeat CT scan in 3 months for follow up of new pulmonary nodule  *******************************************  3/29-no signif changes-ID f/up                                          SEE BELOW:

## 2022-03-29 NOTE — PROGRESS NOTE ADULT - PROBLEM SELECTOR PLAN 3
Multiple episodes of vomiting. Pt reported constipation at rehab and was on lactulose with resolution of constipation. Differential includes C diff given recent Abx use and hospital stay however abdominal exam benign. Possibly in setting of gastroparesis  - Hold C diff testing unless abdominal exam becomes concerning or persistent diarrhea Multiple episodes of vomiting. Pt reported constipation at rehab and was on lactulose with resolution of constipation. Differential includes C diff given recent Abx use and hospital stay however abdominal exam benign. Possibly in setting of gastroparesis  - Hold C diff testing unless abdominal exam becomes concerning or persistent diarrhea  - PPI 40 mg PO daily

## 2022-03-29 NOTE — PHYSICAL THERAPY INITIAL EVALUATION ADULT - ADDITIONAL COMMENTS
Pt came from rehab. Unable to perform standing mobility in >3 week. Prior to first admission 3/8/22, patient living in house with sister, independent with all mobility, no AD for ambulation. No falls in the past 6 months.

## 2022-03-29 NOTE — PHYSICAL THERAPY INITIAL EVALUATION ADULT - TRANSFER TRAINING, PT EVAL
GOAL: Patient will perform supine to sit with Min A to improve posturing for OOB activities in 2 weeks.

## 2022-03-29 NOTE — CONSULT NOTE ADULT - NS ATTEND AMEND GEN_ALL_CORE FT
Patient with normal vocal cord mobility but diffuse edema and patchy yellow debris; pt reports did not eat anything in the past two days so this is likely not food debris. suspect possible candidiasis so will treat with diflucan.

## 2022-03-29 NOTE — PROGRESS NOTE ADULT - SUBJECTIVE AND OBJECTIVE BOX
CHIEF COMPLAINT: f/up sob, chronic resp failure, TBM, severe asthma, allergic rhinitis-    Interval Events:    REVIEW OF SYSTEMS:  Constitutional: No fevers or chills. No weight loss. No fatigue or generalized malaise.  Eyes: No itching or discharge from the eyes  ENT: No ear pain. No ear discharge. No nasal congestion. No post nasal drip. No epistaxis. No throat pain. No sore throat. No difficulty swallowing.   CV: No chest pain. No palpitations. No lightheadedness or dizziness.   Resp: No dyspnea at rest. No dyspnea on exertion. No orthopnea. No wheezing. No cough. No stridor. No sputum production. No chest pain with respiration.  GI: No nausea. No vomiting. No diarrhea.  MSK: No joint pain or pain in any extremities  Integumentary: No skin lesions. No pedal edema.  Neurological: No gross motor weakness. No sensory changes.  [ ] All other systems negative  [ ] Unable to assess ROS because ________    OBJECTIVE:  ICU Vital Signs Last 24 Hrs  T(C): 36.7 (28 Mar 2022 18:30), Max: 39.6 (28 Mar 2022 16:28)  T(F): 98.1 (28 Mar 2022 18:30), Max: 103.2 (28 Mar 2022 16:28)  HR: 92 (29 Mar 2022 05:00) (92 - 115)  BP: 137/91 (29 Mar 2022 05:00) (119/57 - 142/58)  BP(mean): --  ABP: --  ABP(mean): --  RR: 20 (29 Mar 2022 05:00) (16 - 30)  SpO2: 97% (29 Mar 2022 05:00) (92% - 99%)        CAPILLARY BLOOD GLUCOSE      POCT Blood Glucose.: 184 mg/dL (28 Mar 2022 23:45)      PHYSICAL EXAM:  General: Awake, alert, oriented X 3.   HEENT: Atraumatic, normocephalic.                 Mallampatti Grade                 No nasal congestion.                No tonsillar or pharyngeal exudates.  Lymph Nodes: No palpable lymphadenopathy  Neck: No JVD. No carotid bruit.   Respiratory: Normal chest expansion                         Normal percussion                         Normal and equal air entry                         No wheeze, rhonchi or rales.  Cardiovascular: S1 S2 normal. No murmurs, rubs or gallops.   Abdomen: Soft, non-tender, non-distended. No organomegaly. Normoactive bowel sounds.  Extremities: Warm to touch. Peripheral pulse palpable. No pedal edema.   Skin: No rashes or skin lesions  Neurological: Motor and sensory examination equal and normal in all four extremities.  Psychiatry: Appropriate mood and affect.    HOSPITAL MEDICATIONS:  MEDICATIONS  (STANDING):  albuterol/ipratropium for Nebulization 3 milliLiter(s) Nebulizer every 6 hours  apixaban 5 milliGRAM(s) Oral every 12 hours  atorvastatin 20 milliGRAM(s) Oral at bedtime  cefepime   IVPB 2000 milliGRAM(s) IV Intermittent every 8 hours  dextrose 5%. 1000 milliLiter(s) (50 mL/Hr) IV Continuous <Continuous>  dextrose 5%. 1000 milliLiter(s) (100 mL/Hr) IV Continuous <Continuous>  dextrose 50% Injectable 25 Gram(s) IV Push once  dextrose 50% Injectable 12.5 Gram(s) IV Push once  dextrose 50% Injectable 25 Gram(s) IV Push once  glucagon  Injectable 1 milliGRAM(s) IntraMuscular once  insulin glargine Injectable (LANTUS) 10 Unit(s) SubCutaneous at bedtime  insulin lispro (ADMELOG) corrective regimen sliding scale   SubCutaneous three times a day before meals  insulin lispro (ADMELOG) corrective regimen sliding scale   SubCutaneous at bedtime  methylPREDNISolone 8 milliGRAM(s) Oral daily  mexiletine 200 milliGRAM(s) Oral three times a day  pregabalin 150 milliGRAM(s) Oral two times a day  sodium chloride 7% Inhalation 4 milliLiter(s) Inhalation every 12 hours  tiotropium 18 MICROgram(s) Capsule 1 Capsule(s) Inhalation daily  vancomycin  IVPB 1000 milliGRAM(s) IV Intermittent <User Schedule>    MEDICATIONS  (PRN):  acetaminophen     Tablet .. 975 milliGRAM(s) Oral every 6 hours PRN Temp greater or equal to 38C (100.4F), Mild Pain (1 - 3)  dextrose Oral Gel 15 Gram(s) Oral once PRN Blood Glucose LESS THAN 70 milliGRAM(s)/deciliter  ondansetron Injectable 4 milliGRAM(s) IV Push every 8 hours PRN Nausea and/or Vomiting      LABS:                        12.8   9.75  )-----------( 153      ( 28 Mar 2022 17:25 )             41.9         138  |  99  |  15  ----------------------------<  98  3.6   |  25  |  0.54    Ca    8.7      28 Mar 2022 17:25    TPro  6.4  /  Alb  3.1<L>  /  TBili  0.5  /  DBili  x   /  AST  21  /  ALT  42  /  AlkPhos  116        Urinalysis Basic - ( 28 Mar 2022 18:13 )    Color: Yellow / Appearance: Slightly Turbid / S.025 / pH: x  Gluc: x / Ketone: Small  / Bili: Negative / Urobili: 2 mg/dL   Blood: x / Protein: 100 / Nitrite: Negative   Leuk Esterase: Large / RBC: 26 /hpf /  /HPF   Sq Epi: x / Non Sq Epi: 1 /hpf / Bacteria: Few        Venous Blood Gas:   @ 17:22  7.46/45/26/32/40.5  VBG Lactate: 2.1      MICROBIOLOGY:     RADIOLOGY: < from: Xray Chest 1 View- PORTABLE-Urgent (22 @ 16:53) >  COMPARISON: Chest radiograph from 2021. CT chest abdomen and pelvis   2022.    FINDINGS:  Study somewhat limited by patient rotation.  Right chest wall port with tip in the SVC.  There is patchy right basilar opacity which is increased since the prior   study.  There is no pleural effusion or pneumothorax.  The heart size is not well evaluated on this projection.  The visualized osseous structures demonstrate no acute pathology.    IMPRESSION:  Study somewhat limited by patient rotation, increased right basilar   patchy opacity compared to prior study 2021. This may represent   atelectasis or pneumonia      < end of copied text >    [ ] Reviewed and interpreted by me    Point of Care Ultrasound Findings:    PFT:    EKG: CHIEF COMPLAINT: f/up sob, chronic resp failure, TBM, severe asthma, allergic rhinitis-slightly better but no voice, no signf cough but weak over all    Interval Events: ER still awaiting bed    REVIEW OF SYSTEMS:  Constitutional: No fevers or chills. No weight loss. + fatigue or generalized malaise.  Eyes: No itching or discharge from the eyes  ENT: No ear pain. No ear discharge. No nasal congestion. No post nasal drip. No epistaxis. No throat pain. No sore throat. No difficulty swallowing.   CV: No chest pain. No palpitations. No lightheadedness or dizziness.   Resp: No dyspnea at rest. No dyspnea on exertion. No orthopnea. No wheezing. No cough. No stridor. No sputum production. No chest pain with respiration.  GI: No nausea. No vomiting. No diarrhea.  MSK: No joint pain or pain in any extremities  Integumentary: No skin lesions. No pedal edema.  Neurological: + gross motor weakness. No sensory changes.  [ +] All other systems negative  [ ] Unable to assess ROS because ________    OBJECTIVE:  ICU Vital Signs Last 24 Hrs  T(C): 36.7 (28 Mar 2022 18:30), Max: 39.6 (28 Mar 2022 16:28)  T(F): 98.1 (28 Mar 2022 18:30), Max: 103.2 (28 Mar 2022 16:28)  HR: 92 (29 Mar 2022 05:00) (92 - 115)  BP: 137/91 (29 Mar 2022 05:00) (119/57 - 142/58)  BP(mean): --  ABP: --  ABP(mean): --  RR: 20 (29 Mar 2022 05:00) (16 - 30)  SpO2: 97% (29 Mar 2022 05:00) (92% - 99%)        CAPILLARY BLOOD GLUCOSE      POCT Blood Glucose.: 184 mg/dL (28 Mar 2022 23:45)      PHYSICAL EXAM: NAD in bed on NC O2  General: Awake, alert, oriented X 3.   HEENT: Atraumatic, normocephalic.                 Mallampatti Grade 2                No nasal congestion.                No tonsillar or pharyngeal exudates.  Lymph Nodes: No palpable lymphadenopathy  Neck: No JVD. No carotid bruit.   Respiratory: Normal chest expansion                         Normal percussion                         Normal and equal air entry                         mild exp wheeze,no rhonchi or rales.  Cardiovascular: S1 S2 normal. No murmurs, rubs or gallops.   Abdomen: Soft, non-tender, non-distended. No organomegaly. Normoactive bowel sounds.  Extremities: Warm to touch. Peripheral pulse palpable. No pedal edema.   Skin: No rashes or skin lesions  Neurological: Motor and sensory examination equal and abnormal in all four extremities.  Psychiatry: Appropriate mood and affect.    HOSPITAL MEDICATIONS:  MEDICATIONS  (STANDING):  albuterol/ipratropium for Nebulization 3 milliLiter(s) Nebulizer every 6 hours  apixaban 5 milliGRAM(s) Oral every 12 hours  atorvastatin 20 milliGRAM(s) Oral at bedtime  cefepime   IVPB 2000 milliGRAM(s) IV Intermittent every 8 hours  dextrose 5%. 1000 milliLiter(s) (50 mL/Hr) IV Continuous <Continuous>  dextrose 5%. 1000 milliLiter(s) (100 mL/Hr) IV Continuous <Continuous>  dextrose 50% Injectable 25 Gram(s) IV Push once  dextrose 50% Injectable 12.5 Gram(s) IV Push once  dextrose 50% Injectable 25 Gram(s) IV Push once  glucagon  Injectable 1 milliGRAM(s) IntraMuscular once  insulin glargine Injectable (LANTUS) 10 Unit(s) SubCutaneous at bedtime  insulin lispro (ADMELOG) corrective regimen sliding scale   SubCutaneous three times a day before meals  insulin lispro (ADMELOG) corrective regimen sliding scale   SubCutaneous at bedtime  methylPREDNISolone 8 milliGRAM(s) Oral daily  mexiletine 200 milliGRAM(s) Oral three times a day  pregabalin 150 milliGRAM(s) Oral two times a day  sodium chloride 7% Inhalation 4 milliLiter(s) Inhalation every 12 hours  tiotropium 18 MICROgram(s) Capsule 1 Capsule(s) Inhalation daily  vancomycin  IVPB 1000 milliGRAM(s) IV Intermittent <User Schedule>    MEDICATIONS  (PRN):  acetaminophen     Tablet .. 975 milliGRAM(s) Oral every 6 hours PRN Temp greater or equal to 38C (100.4F), Mild Pain (1 - 3)  dextrose Oral Gel 15 Gram(s) Oral once PRN Blood Glucose LESS THAN 70 milliGRAM(s)/deciliter  ondansetron Injectable 4 milliGRAM(s) IV Push every 8 hours PRN Nausea and/or Vomiting      LABS:                        12.8   9.75  )-----------( 153      ( 28 Mar 2022 17:25 )             41.9         138  |  99  |  15  ----------------------------<  98  3.6   |  25  |  0.54    Ca    8.7      28 Mar 2022 17:25    TPro  6.4  /  Alb  3.1<L>  /  TBili  0.5  /  DBili  x   /  AST  21  /  ALT  42  /  AlkPhos  116        Urinalysis Basic - ( 28 Mar 2022 18:13 )    Color: Yellow / Appearance: Slightly Turbid / S.025 / pH: x  Gluc: x / Ketone: Small  / Bili: Negative / Urobili: 2 mg/dL   Blood: x / Protein: 100 / Nitrite: Negative   Leuk Esterase: Large / RBC: 26 /hpf /  /HPF   Sq Epi: x / Non Sq Epi: 1 /hpf / Bacteria: Few        Venous Blood Gas:   @ 17:22  7.46/45/26/32/40.5  VBG Lactate: 2.1      MICROBIOLOGY:     RADIOLOGY: < from: Xray Chest 1 View- PORTABLE-Urgent (22 @ 16:53) >  COMPARISON: Chest radiograph from 2021. CT chest abdomen and pelvis   2022.    FINDINGS:  Study somewhat limited by patient rotation.  Right chest wall port with tip in the SVC.  There is patchy right basilar opacity which is increased since the prior   study.  There is no pleural effusion or pneumothorax.  The heart size is not well evaluated on this projection.  The visualized osseous structures demonstrate no acute pathology.    IMPRESSION:  Study somewhat limited by patient rotation, increased right basilar   patchy opacity compared to prior study 2021. This may represent   atelectasis or pneumonia      < end of copied text >  < from: CT Chest w/ IV Cont (22 @ 13:07) >    IMPRESSION:  No evidence of metastatic disease or pathologic adenopathy.  Persistent groundglass abnormality and nodularity as detailed above,   consistent with ongoing indolent infection versus   noninfectious-inflammatory process.  Additional stable findings as above.    < end of copied text >    [ ] Reviewed and interpreted by me    Point of Care Ultrasound Findings:    PFT:    EKG:

## 2022-03-29 NOTE — PROGRESS NOTE ADULT - PROBLEM SELECTOR PLAN 9
DVT: Eliquis  Diet: pureed, speech/swallow eval  Code: Full code started after extubation at [facility]  - ENT karen, appreciate assistance

## 2022-03-29 NOTE — CONSULT NOTE ADULT - PROBLEM SELECTOR RECOMMENDATION 9
Laryngoscope today Diflucan 200mg. IV first dose  DIflucan 100mg. IV x 6 days  ENT will rescope when treatment is finished

## 2022-03-29 NOTE — PROGRESS NOTE ADULT - PROBLEM SELECTOR PLAN 1
Febrile, tachycardia, tachypnea, lactate 2.1. Associated AHRF, productive cough. Recent hospital stay with intubation for pneumonia at OSH and intubated, unclear which antibiotics were given and duration of treatment. Likely in setting of pneumonia and bronchiectasis exacerbation. History of sputum Cx +Pseudomonas (Feb 2020) via bronchoscopy. Also with UA grossly positive, however pt without symptoms  - S/p cefepime and vancomycin in ED, 500 cc bolus  - Cont cefepime for Pseudomonas coverage and vancomycin given recent intubation  - BCx, UCx pending  - Sputum Cx  - MRSA PCR  - RVP negative, repeat pending  - Consider ID consult Febrile, tachycardia, tachypnea, lactate 2.1. Associated AHRF, productive cough. Recent hospital stay with intubation for pneumonia at OSH and intubated, unclear which antibiotics were given and duration of treatment. Likely in setting of pneumonia and bronchiectasis exacerbation. History of sputum Cx +Pseudomonas (Feb 2020) via bronchoscopy. Also with UA grossly positive, however pt without symptoms  - S/p cefepime and vancomycin in ED, 500 cc bolus, MRSA+, RVP neg  - Cont cefepime for Pseudomonas coverage and vancomycin given recent intubation (3/29 - )  - BCx, UCx pending  - Sputum Cx pending  - Consider ID consult

## 2022-03-29 NOTE — CONSULT NOTE ADULT - ASSESSMENT
:71 year old woman with history of tracheobronchomalasia s/p tracheobronchoplasty, bronchiectasis, severe persistent asthma (steroid dependent), adrenal insuffiencey on chronic steroids, DM admitted now with worsening SOB, lethargy and aphonia. Laryngoscope at bedside revealed diffuse yellow patches of debris  throughout oropharynx (?thrush) with normal vocal cord function. We are recommending Diflucan x 7 days and ENT will rescope after treatment.

## 2022-03-30 LAB
ALBUMIN SERPL ELPH-MCNC: 2.6 G/DL — LOW (ref 3.3–5)
ALP SERPL-CCNC: 102 U/L — SIGNIFICANT CHANGE UP (ref 40–120)
ALT FLD-CCNC: 46 U/L — HIGH (ref 10–45)
ANION GAP SERPL CALC-SCNC: 11 MMOL/L — SIGNIFICANT CHANGE UP (ref 5–17)
AST SERPL-CCNC: 27 U/L — SIGNIFICANT CHANGE UP (ref 10–40)
BASOPHILS # BLD AUTO: 0 K/UL — SIGNIFICANT CHANGE UP (ref 0–0.2)
BASOPHILS NFR BLD AUTO: 0 % — SIGNIFICANT CHANGE UP (ref 0–2)
BILIRUB SERPL-MCNC: 0.3 MG/DL — SIGNIFICANT CHANGE UP (ref 0.2–1.2)
BUN SERPL-MCNC: 22 MG/DL — SIGNIFICANT CHANGE UP (ref 7–23)
CALCIUM SERPL-MCNC: 8.5 MG/DL — SIGNIFICANT CHANGE UP (ref 8.4–10.5)
CHLORIDE SERPL-SCNC: 100 MMOL/L — SIGNIFICANT CHANGE UP (ref 96–108)
CO2 SERPL-SCNC: 24 MMOL/L — SIGNIFICANT CHANGE UP (ref 22–31)
CREAT SERPL-MCNC: 0.43 MG/DL — LOW (ref 0.5–1.3)
EGFR: 103 ML/MIN/1.73M2 — SIGNIFICANT CHANGE UP
EOSINOPHIL # BLD AUTO: 0.07 K/UL — SIGNIFICANT CHANGE UP (ref 0–0.5)
EOSINOPHIL NFR BLD AUTO: 0.9 % — SIGNIFICANT CHANGE UP (ref 0–6)
GAS PNL BLDA: SIGNIFICANT CHANGE UP
GLUCOSE BLDC GLUCOMTR-MCNC: 161 MG/DL — HIGH (ref 70–99)
GLUCOSE BLDC GLUCOMTR-MCNC: 221 MG/DL — HIGH (ref 70–99)
GLUCOSE BLDC GLUCOMTR-MCNC: 239 MG/DL — HIGH (ref 70–99)
GLUCOSE BLDC GLUCOMTR-MCNC: 251 MG/DL — HIGH (ref 70–99)
GLUCOSE BLDC GLUCOMTR-MCNC: 62 MG/DL — LOW (ref 70–99)
GLUCOSE SERPL-MCNC: 249 MG/DL — HIGH (ref 70–99)
HCT VFR BLD CALC: 35.3 % — SIGNIFICANT CHANGE UP (ref 34.5–45)
HGB BLD-MCNC: 10.8 G/DL — LOW (ref 11.5–15.5)
LYMPHOCYTES # BLD AUTO: 0.39 K/UL — LOW (ref 1–3.3)
LYMPHOCYTES # BLD AUTO: 5.2 % — LOW (ref 13–44)
MAGNESIUM SERPL-MCNC: 1.8 MG/DL — SIGNIFICANT CHANGE UP (ref 1.6–2.6)
MANUAL SMEAR VERIFICATION: SIGNIFICANT CHANGE UP
MCHC RBC-ENTMCNC: 22.5 PG — LOW (ref 27–34)
MCHC RBC-ENTMCNC: 30.6 GM/DL — LOW (ref 32–36)
MCV RBC AUTO: 73.4 FL — LOW (ref 80–100)
MONOCYTES # BLD AUTO: 0.13 K/UL — SIGNIFICANT CHANGE UP (ref 0–0.9)
MONOCYTES NFR BLD AUTO: 1.7 % — LOW (ref 2–14)
MYELOCYTES NFR BLD: 1.7 % — HIGH (ref 0–0)
NEUTROPHILS # BLD AUTO: 6.72 K/UL — SIGNIFICANT CHANGE UP (ref 1.8–7.4)
NEUTROPHILS NFR BLD AUTO: 81.8 % — HIGH (ref 43–77)
NEUTS BAND # BLD: 8.7 % — HIGH (ref 0–8)
NRBC # BLD: 2 /100 — HIGH (ref 0–0)
PHOSPHATE SERPL-MCNC: 1.4 MG/DL — LOW (ref 2.5–4.5)
PLAT MORPH BLD: NORMAL — SIGNIFICANT CHANGE UP
PLATELET # BLD AUTO: 185 K/UL — SIGNIFICANT CHANGE UP (ref 150–400)
POTASSIUM SERPL-MCNC: 4.4 MMOL/L — SIGNIFICANT CHANGE UP (ref 3.5–5.3)
POTASSIUM SERPL-SCNC: 4.4 MMOL/L — SIGNIFICANT CHANGE UP (ref 3.5–5.3)
PROT SERPL-MCNC: 5.8 G/DL — LOW (ref 6–8.3)
RBC # BLD: 4.81 M/UL — SIGNIFICANT CHANGE UP (ref 3.8–5.2)
RBC # FLD: 19.2 % — HIGH (ref 10.3–14.5)
RBC BLD AUTO: SIGNIFICANT CHANGE UP
SODIUM SERPL-SCNC: 135 MMOL/L — SIGNIFICANT CHANGE UP (ref 135–145)
VANCOMYCIN TROUGH SERPL-MCNC: 11.1 UG/ML — SIGNIFICANT CHANGE UP (ref 10–20)
WBC # BLD: 7.42 K/UL — SIGNIFICANT CHANGE UP (ref 3.8–10.5)
WBC # FLD AUTO: 7.42 K/UL — SIGNIFICANT CHANGE UP (ref 3.8–10.5)

## 2022-03-30 PROCEDURE — 99232 SBSQ HOSP IP/OBS MODERATE 35: CPT

## 2022-03-30 PROCEDURE — 99233 SBSQ HOSP IP/OBS HIGH 50: CPT | Mod: GC

## 2022-03-30 PROCEDURE — 74018 RADEX ABDOMEN 1 VIEW: CPT | Mod: 26

## 2022-03-30 RX ORDER — INSULIN LISPRO 100/ML
3 VIAL (ML) SUBCUTANEOUS
Refills: 0 | Status: DISCONTINUED | OUTPATIENT
Start: 2022-03-30 | End: 2022-03-30

## 2022-03-30 RX ORDER — POLYETHYLENE GLYCOL 3350 17 G/17G
17 POWDER, FOR SOLUTION ORAL ONCE
Refills: 0 | Status: COMPLETED | OUTPATIENT
Start: 2022-03-30 | End: 2022-03-30

## 2022-03-30 RX ORDER — POLYETHYLENE GLYCOL 3350 17 G/17G
17 POWDER, FOR SOLUTION ORAL DAILY
Refills: 0 | Status: DISCONTINUED | OUTPATIENT
Start: 2022-03-30 | End: 2022-04-02

## 2022-03-30 RX ORDER — SENNA PLUS 8.6 MG/1
2 TABLET ORAL AT BEDTIME
Refills: 0 | Status: DISCONTINUED | OUTPATIENT
Start: 2022-03-30 | End: 2022-04-07

## 2022-03-30 RX ORDER — FLUCONAZOLE 150 MG/1
100 TABLET ORAL EVERY 24 HOURS
Refills: 0 | Status: DISCONTINUED | OUTPATIENT
Start: 2022-03-31 | End: 2022-03-31

## 2022-03-30 RX ORDER — FLUCONAZOLE 150 MG/1
200 TABLET ORAL ONCE
Refills: 0 | Status: COMPLETED | OUTPATIENT
Start: 2022-03-30 | End: 2022-03-30

## 2022-03-30 RX ORDER — DEXTROSE 50 % IN WATER 50 %
25 SYRINGE (ML) INTRAVENOUS ONCE
Refills: 0 | Status: COMPLETED | OUTPATIENT
Start: 2022-03-30 | End: 2022-03-30

## 2022-03-30 RX ADMIN — Medication 4 MILLIGRAM(S): at 20:52

## 2022-03-30 RX ADMIN — ATORVASTATIN CALCIUM 20 MILLIGRAM(S): 80 TABLET, FILM COATED ORAL at 22:31

## 2022-03-30 RX ADMIN — Medication 975 MILLIGRAM(S): at 19:00

## 2022-03-30 RX ADMIN — FLUCONAZOLE 100 MILLIGRAM(S): 150 TABLET ORAL at 10:04

## 2022-03-30 RX ADMIN — Medication 250 MILLIGRAM(S): at 20:13

## 2022-03-30 RX ADMIN — Medication 3 MILLILITER(S): at 17:36

## 2022-03-30 RX ADMIN — PANTOPRAZOLE SODIUM 40 MILLIGRAM(S): 20 TABLET, DELAYED RELEASE ORAL at 06:00

## 2022-03-30 RX ADMIN — Medication 3: at 09:49

## 2022-03-30 RX ADMIN — APIXABAN 5 MILLIGRAM(S): 2.5 TABLET, FILM COATED ORAL at 17:39

## 2022-03-30 RX ADMIN — Medication 4 MILLIGRAM(S): at 06:00

## 2022-03-30 RX ADMIN — SODIUM CHLORIDE 4 MILLILITER(S): 9 INJECTION INTRAMUSCULAR; INTRAVENOUS; SUBCUTANEOUS at 17:38

## 2022-03-30 RX ADMIN — MUPIROCIN 1 APPLICATION(S): 20 OINTMENT TOPICAL at 18:05

## 2022-03-30 RX ADMIN — Medication 2: at 13:57

## 2022-03-30 RX ADMIN — CEFEPIME 100 MILLIGRAM(S): 1 INJECTION, POWDER, FOR SOLUTION INTRAMUSCULAR; INTRAVENOUS at 17:35

## 2022-03-30 RX ADMIN — Medication 3 MILLILITER(S): at 13:05

## 2022-03-30 RX ADMIN — SENNA PLUS 2 TABLET(S): 8.6 TABLET ORAL at 22:51

## 2022-03-30 RX ADMIN — CEFEPIME 100 MILLIGRAM(S): 1 INJECTION, POWDER, FOR SOLUTION INTRAMUSCULAR; INTRAVENOUS at 08:00

## 2022-03-30 RX ADMIN — MEXILETINE HYDROCHLORIDE 200 MILLIGRAM(S): 150 CAPSULE ORAL at 17:36

## 2022-03-30 RX ADMIN — Medication 250 MILLIGRAM(S): at 06:01

## 2022-03-30 RX ADMIN — INSULIN GLARGINE 10 UNIT(S): 100 INJECTION, SOLUTION SUBCUTANEOUS at 22:28

## 2022-03-30 RX ADMIN — APIXABAN 5 MILLIGRAM(S): 2.5 TABLET, FILM COATED ORAL at 06:00

## 2022-03-30 RX ADMIN — Medication 150 MILLIGRAM(S): at 06:00

## 2022-03-30 RX ADMIN — Medication 3 MILLILITER(S): at 06:01

## 2022-03-30 RX ADMIN — POLYETHYLENE GLYCOL 3350 17 GRAM(S): 17 POWDER, FOR SOLUTION ORAL at 13:06

## 2022-03-30 RX ADMIN — Medication 3 UNIT(S): at 13:57

## 2022-03-30 RX ADMIN — SODIUM CHLORIDE 4 MILLILITER(S): 9 INJECTION INTRAMUSCULAR; INTRAVENOUS; SUBCUTANEOUS at 06:01

## 2022-03-30 RX ADMIN — CEFEPIME 100 MILLIGRAM(S): 1 INJECTION, POWDER, FOR SOLUTION INTRAMUSCULAR; INTRAVENOUS at 00:26

## 2022-03-30 RX ADMIN — Medication 25 GRAM(S): at 17:35

## 2022-03-30 RX ADMIN — Medication 975 MILLIGRAM(S): at 17:39

## 2022-03-30 RX ADMIN — Medication 150 MILLIGRAM(S): at 17:43

## 2022-03-30 RX ADMIN — Medication 3 MILLILITER(S): at 00:26

## 2022-03-30 RX ADMIN — MUPIROCIN 1 APPLICATION(S): 20 OINTMENT TOPICAL at 08:05

## 2022-03-30 RX ADMIN — MEXILETINE HYDROCHLORIDE 200 MILLIGRAM(S): 150 CAPSULE ORAL at 22:28

## 2022-03-30 RX ADMIN — MEXILETINE HYDROCHLORIDE 200 MILLIGRAM(S): 150 CAPSULE ORAL at 06:01

## 2022-03-30 NOTE — SWALLOW BEDSIDE ASSESSMENT ADULT - ASR SWALLOW RECOMMEND DIAG
FEES. Pt not a candidate for MBS given reduced trunk support and generalized weakness. Per daughter, has not been OOB since 3/8/22.

## 2022-03-30 NOTE — PROGRESS NOTE ADULT - SUBJECTIVE AND OBJECTIVE BOX
PROGRESS NOTE:   Authored by Raissa Nguyen MD  Internal Medicine      Patient is a 71y old  Female who presents with a chief complaint of sepsis (30 Mar 2022 05:14)      SUBJECTIVE / OVERNIGHT EVENTS: blood glucose elevated to 400s overnight, patient seen and examined at bedside    MEDICATIONS  (STANDING):  albuterol/ipratropium for Nebulization 3 milliLiter(s) Nebulizer every 6 hours  apixaban 5 milliGRAM(s) Oral every 12 hours  atorvastatin 20 milliGRAM(s) Oral at bedtime  cefepime   IVPB 2000 milliGRAM(s) IV Intermittent every 8 hours  chlorhexidine 2% Cloths 1 Application(s) Topical <User Schedule>  dextrose 5%. 1000 milliLiter(s) (50 mL/Hr) IV Continuous <Continuous>  dextrose 5%. 1000 milliLiter(s) (100 mL/Hr) IV Continuous <Continuous>  dextrose 50% Injectable 25 Gram(s) IV Push once  dextrose 50% Injectable 12.5 Gram(s) IV Push once  dextrose 50% Injectable 25 Gram(s) IV Push once  glucagon  Injectable 1 milliGRAM(s) IntraMuscular once  influenza  Vaccine (HIGH DOSE) 0.7 milliLiter(s) IntraMuscular once  insulin glargine Injectable (LANTUS) 10 Unit(s) SubCutaneous at bedtime  insulin lispro (ADMELOG) corrective regimen sliding scale   SubCutaneous three times a day before meals  insulin lispro (ADMELOG) corrective regimen sliding scale   SubCutaneous at bedtime  methylPREDNISolone 4 milliGRAM(s) Oral daily  mexiletine 200 milliGRAM(s) Oral three times a day  mupirocin 2% Ointment 1 Application(s) Both Nostrils two times a day  pantoprazole    Tablet 40 milliGRAM(s) Oral before breakfast  pregabalin 150 milliGRAM(s) Oral two times a day  sodium chloride 7% Inhalation 4 milliLiter(s) Inhalation every 12 hours  vancomycin  IVPB 1000 milliGRAM(s) IV Intermittent <User Schedule>    MEDICATIONS  (PRN):  acetaminophen     Tablet .. 975 milliGRAM(s) Oral every 6 hours PRN Temp greater or equal to 38C (100.4F), Mild Pain (1 - 3)  dextrose Oral Gel 15 Gram(s) Oral once PRN Blood Glucose LESS THAN 70 milliGRAM(s)/deciliter  ondansetron Injectable 4 milliGRAM(s) IV Push every 8 hours PRN Nausea and/or Vomiting      CAPILLARY BLOOD GLUCOSE      POCT Blood Glucose.: 304 mg/dL (29 Mar 2022 23:28)  POCT Blood Glucose.: 350 mg/dL (29 Mar 2022 21:24)  POCT Blood Glucose.: 403 mg/dL (29 Mar 2022 21:23)  POCT Blood Glucose.: 237 mg/dL (29 Mar 2022 17:22)  POCT Blood Glucose.: 330 mg/dL (29 Mar 2022 13:49)    I&O's Summary    29 Mar 2022 07:01  -  30 Mar 2022 07:00  --------------------------------------------------------  IN: 0 mL / OUT: 1 mL / NET: -1 mL        PHYSICAL EXAM:  Vital Signs Last 24 Hrs  T(C): 37 (30 Mar 2022 04:37), Max: 37.2 (30 Mar 2022 00:00)  T(F): 98.6 (30 Mar 2022 04:37), Max: 98.9 (30 Mar 2022 00:00)  HR: 96 (30 Mar 2022 04:37) (84 - 98)  BP: 153/73 (30 Mar 2022 04:37) (126/57 - 153/73)  BP(mean): --  RR: 18 (30 Mar 2022 04:37) (18 - 20)  SpO2: 95% (30 Mar 2022 04:37) (94% - 99%)  CONSTITUTIONAL: Well-groomed, in no apparent distress  EYES: No conjunctival or scleral injection, non-icteric;   ENMT: No external nasal lesions; MMM  NECK: Trachea midline without palpable neck mass; thyroid not enlarged and non-tender  RESPIRATORY: Breathing comfortably; no dullness to percussion; lungs CTA without wheeze/rhonchi/rales  CARDIOVASCULAR: +S1S2, RRR, no M/G/R; pedal pulses full and symmetric; no lower extremity edema  GASTROINTESTINAL: No palpable masses or tenderness, +BS throughout, no rebound/guarding; no hepatosplenomegaly; no hernia palpated  LYMPHATIC: No cervical LAD or tenderness  SKIN: No rashes or ulcers noted  NEUROLOGIC: CN II-XII intact; sensation intact in LEs b/l to light touch  PSYCHIATRIC: A+O x 3; mood and affect appropriate; appropriate insight and judgment    LABS:                        11.4   8.89  )-----------( 176      ( 29 Mar 2022 09:41 )             35.1         137  |  100  |  25<H>  ----------------------------<  357<H>  4.1   |  26  |  0.48<L>    Ca    8.7      29 Mar 2022 22:49  Phos  3.1       Mg     1.9         TPro  5.7<L>  /  Alb  2.6<L>  /  TBili  0.4  /  DBili  x   /  AST  31  /  ALT  42  /  AlkPhos  96            Urinalysis Basic - ( 28 Mar 2022 18:13 )    Color: Yellow / Appearance: Slightly Turbid / S.025 / pH: x  Gluc: x / Ketone: Small  / Bili: Negative / Urobili: 2 mg/dL   Blood: x / Protein: 100 / Nitrite: Negative   Leuk Esterase: Large / RBC: 26 /hpf /  /HPF   Sq Epi: x / Non Sq Epi: 1 /hpf / Bacteria: Few        Culture - Sputum (collected 29 Mar 2022 06:25)  Source: .Sputum Sputum  Gram Stain (29 Mar 2022 12:07):    Few Squamous epithelial cells per low power field    Few polymorphonuclear leukocytes per low power field    Numerous Gram positive cocci in pairs, chains and clusters per oil power    field    Numerous Gram Negative Rods per oil power field    Few Gram Variable Rods per oil power field    Culture - Urine (collected 28 Mar 2022 22:11)  Source: Clean Catch Clean Catch (Midstream)  Preliminary Report (29 Mar 2022 22:17):    Culture in progress    Culture - Blood (collected 28 Mar 2022 22:03)  Source: .Blood Blood-Peripheral  Preliminary Report (29 Mar 2022 23:01):    No growth to date.    Culture - Blood (collected 28 Mar 2022 22:03)  Source: .Blood Blood-Peripheral  Preliminary Report (29 Mar 2022 23:01):    No growth to date.        RADIOLOGY & ADDITIONAL TESTS:  Results Reviewed:   Imaging Personally Reviewed:  Electrocardiogram Personally Reviewed:    COORDINATION OF CARE:  Care Discussed with Consultants/Other Providers [Y/N]:  Prior or Outpatient Records Reviewed [Y/N]:   PROGRESS NOTE:   Authored by Raissa Nguyen MD  Internal Medicine      Patient is a 71y old  Female who presents with a chief complaint of sepsis (30 Mar 2022 05:14)      SUBJECTIVE / OVERNIGHT EVENTS: blood glucose elevated to 400s overnight, patient seen and examined at bedside. ENT: laryngoscope ?debris vs thrush. reports feeling fatigued and has some belly pain. no n/v. No dysphagia/odynophagia. no increased stool output, still passing gas.     MEDICATIONS  (STANDING):  albuterol/ipratropium for Nebulization 3 milliLiter(s) Nebulizer every 6 hours  apixaban 5 milliGRAM(s) Oral every 12 hours  atorvastatin 20 milliGRAM(s) Oral at bedtime  cefepime   IVPB 2000 milliGRAM(s) IV Intermittent every 8 hours  chlorhexidine 2% Cloths 1 Application(s) Topical <User Schedule>  dextrose 5%. 1000 milliLiter(s) (50 mL/Hr) IV Continuous <Continuous>  dextrose 5%. 1000 milliLiter(s) (100 mL/Hr) IV Continuous <Continuous>  dextrose 50% Injectable 25 Gram(s) IV Push once  dextrose 50% Injectable 12.5 Gram(s) IV Push once  dextrose 50% Injectable 25 Gram(s) IV Push once  glucagon  Injectable 1 milliGRAM(s) IntraMuscular once  influenza  Vaccine (HIGH DOSE) 0.7 milliLiter(s) IntraMuscular once  insulin glargine Injectable (LANTUS) 10 Unit(s) SubCutaneous at bedtime  insulin lispro (ADMELOG) corrective regimen sliding scale   SubCutaneous three times a day before meals  insulin lispro (ADMELOG) corrective regimen sliding scale   SubCutaneous at bedtime  methylPREDNISolone 4 milliGRAM(s) Oral daily  mexiletine 200 milliGRAM(s) Oral three times a day  mupirocin 2% Ointment 1 Application(s) Both Nostrils two times a day  pantoprazole    Tablet 40 milliGRAM(s) Oral before breakfast  pregabalin 150 milliGRAM(s) Oral two times a day  sodium chloride 7% Inhalation 4 milliLiter(s) Inhalation every 12 hours  vancomycin  IVPB 1000 milliGRAM(s) IV Intermittent <User Schedule>    MEDICATIONS  (PRN):  acetaminophen     Tablet .. 975 milliGRAM(s) Oral every 6 hours PRN Temp greater or equal to 38C (100.4F), Mild Pain (1 - 3)  dextrose Oral Gel 15 Gram(s) Oral once PRN Blood Glucose LESS THAN 70 milliGRAM(s)/deciliter  ondansetron Injectable 4 milliGRAM(s) IV Push every 8 hours PRN Nausea and/or Vomiting      CAPILLARY BLOOD GLUCOSE      POCT Blood Glucose.: 304 mg/dL (29 Mar 2022 23:28)  POCT Blood Glucose.: 350 mg/dL (29 Mar 2022 21:24)  POCT Blood Glucose.: 403 mg/dL (29 Mar 2022 21:23)  POCT Blood Glucose.: 237 mg/dL (29 Mar 2022 17:22)  POCT Blood Glucose.: 330 mg/dL (29 Mar 2022 13:49)    I&O's Summary    29 Mar 2022 07:01  -  30 Mar 2022 07:00  --------------------------------------------------------  IN: 0 mL / OUT: 1 mL / NET: -1 mL        PHYSICAL EXAM:  Vital Signs Last 24 Hrs  T(C): 37 (30 Mar 2022 04:37), Max: 37.2 (30 Mar 2022 00:00)  T(F): 98.6 (30 Mar 2022 04:37), Max: 98.9 (30 Mar 2022 00:00)  HR: 96 (30 Mar 2022 04:37) (84 - 98)  BP: 153/73 (30 Mar 2022 04:37) (126/57 - 153/73)  BP(mean): --  RR: 18 (30 Mar 2022 04:37) (18 - 20)  SpO2: 95% (30 Mar 2022 04:37) (94% - 99%)  CONSTITUTIONAL: appears fatigued, NAD, hypophonic  EYES: No conjunctival or scleral injection, non-icteric;   ENMT: No external nasal lesions; MMM  NECK: Trachea midline without palpable neck mass; thyroid not enlarged and non-tender  RESPIRATORY: Breathing comfortably on 3L NC; late inspiratory wheeze in right anterior lung field  CARDIOVASCULAR: +S1S2, RRR, no M/G/R; pedal pulses full and symmetric; no lower extremity edema  GASTROINTESTINAL: No palpable masses or tenderness, +BS throughout, no rebound/guarding; no hepatosplenomegaly; no hernia palpated  LYMPHATIC: No cervical LAD or tenderness  SKIN: No rashes or ulcers noted  NEUROLOGIC: moves all extremities spontaneously, follows commands x4  PSYCHIATRIC: A+O x 3; mood and affect appropriate; appropriate insight and judgment    LABS:                        11.4   8.89  )-----------( 176      ( 29 Mar 2022 09:41 )             35.1         137  |  100  |  25<H>  ----------------------------<  357<H>  4.1   |  26  |  0.48<L>    Ca    8.7      29 Mar 2022 22:49  Phos  3.1       Mg     1.9         TPro  5.7<L>  /  Alb  2.6<L>  /  TBili  0.4  /  DBili  x   /  AST  31  /  ALT  42  /  AlkPhos  96            Urinalysis Basic - ( 28 Mar 2022 18:13 )    Color: Yellow / Appearance: Slightly Turbid / S.025 / pH: x  Gluc: x / Ketone: Small  / Bili: Negative / Urobili: 2 mg/dL   Blood: x / Protein: 100 / Nitrite: Negative   Leuk Esterase: Large / RBC: 26 /hpf /  /HPF   Sq Epi: x / Non Sq Epi: 1 /hpf / Bacteria: Few        Culture - Sputum (collected 29 Mar 2022 06:25)  Source: .Sputum Sputum  Gram Stain (29 Mar 2022 12:07):    Few Squamous epithelial cells per low power field    Few polymorphonuclear leukocytes per low power field    Numerous Gram positive cocci in pairs, chains and clusters per oil power    field    Numerous Gram Negative Rods per oil power field    Few Gram Variable Rods per oil power field    Culture - Urine (collected 28 Mar 2022 22:11)  Source: Clean Catch Clean Catch (Midstream)  Preliminary Report (29 Mar 2022 22:17):    Culture in progress    Culture - Blood (collected 28 Mar 2022 22:03)  Source: .Blood Blood-Peripheral  Preliminary Report (29 Mar 2022 23:01):    No growth to date.    Culture - Blood (collected 28 Mar 2022 22:03)  Source: .Blood Blood-Peripheral  Preliminary Report (29 Mar 2022 23:01):    No growth to date.        RADIOLOGY & ADDITIONAL TESTS:  Results Reviewed:   Imaging Personally Reviewed:  Electrocardiogram Personally Reviewed:    COORDINATION OF CARE:  Care Discussed with Consultants/Other Providers [Y/N]:  Prior or Outpatient Records Reviewed [Y/N]:

## 2022-03-30 NOTE — SWALLOW BEDSIDE ASSESSMENT ADULT - ASR SWALLOW REFERRAL
Consider Nutrition consult given poor PO intake. Daughter would like to be contacted to provide food preferences.

## 2022-03-30 NOTE — PROGRESS NOTE ADULT - ASSESSMENT
70 y/o F w/tracheobronchomalacia s/p tracheobronchoplasty, severe persistent asthma, bronchiectasis, and colon cancer s/p colectomy admitted with likely exacerbation of bronchiectasis due to pneumonia.    - Supplemental O2 as needed goal O2 sat >= 90%  - Broad spectrum abx including pseudomonal coverage  - Airway clearance, acapella device, bronchodilators, chest PT  - Continue home asthma regimen  - Repeat CT scan in 3 months for follow up of new pulmonary nodule  *******************************************  3/29-no signif changes-ID f/up                                          SEE BELOW:  3/30-ENT evaln= fungus, ID f/up, diflucan in place; ? need for fob here

## 2022-03-30 NOTE — SWALLOW BEDSIDE ASSESSMENT ADULT - PHARYNGEAL PHASE
Subjective decreased hyolaryngeal elevation upon palpation Suspected delayed trigger of pharyngeal swallow; Subjective decreased hyolaryngeal elevation upon palpation

## 2022-03-30 NOTE — PROGRESS NOTE ADULT - PROBLEM SELECTOR PLAN 10
DVT: Eliquis  Diet: pureed, speech/swallow eval passed  Code: Full code DVT: Eliquis  Diet: pureed, speech/swallow eval passed  PT: MADISON  LDA: colostomy, PIV  Code: Full code DVT: Eliquis  Diet: pureed, speech/swallow eval passed, nutrition consult, pending recs  PT: MADISON  LDA: colostomy, PIV  Code: Full code

## 2022-03-30 NOTE — CHART NOTE - NSCHARTNOTEFT_GEN_A_CORE
Assessed patient at bedside.  Patient states at home she takes methylprednisolone 8mg PO daily since her 30s. Verified admission med rec, confirmed home methylprednisolone 8mg PO daily.     Patient's medication currently ordered as methylprenisolone 4mg PO daily -----changed to 8mg PO daily starting 3/31, also gave one time 4mg dose 3/30 PM as catchup dose.

## 2022-03-30 NOTE — SWALLOW BEDSIDE ASSESSMENT ADULT - COMMENTS
Hx cont: In ED: uWBC 197 with yeast-like cells. CXR limited but unremarkable. S/p cefepime, vanocmycin, and solu-medrol. Admitted with severe SEPSIS and acute hypoxic respiratory failure possibly from PNA and concomitant bronchiectasis exacerbation. Pulm consulted. Exacerbation of bronchiectasis due to PNA. Multifactorial dyspnea-TBM, CB, severe persistent asthma, ? PNA, ? VC dysfunction, debility, anemia, CAD. ENT consulted for Aphonia. Laryngoscope at bedside revealed diffuse yellow patches of debris throughout oropharynx (?thrush) with normal vocal cord function. Recommending Diflucan x 7 days and ENT will rescope after treatment. 3/30: slightly better overall-less cough.     IMAGING:  CT CHEST: 3/28/22  IMPRESSION:  Compared to 2/9/2022 chest CT, worsening extensive bronchial impaction and tree-in-bud nodularity with new right lower lobe consolidation on a   background of bronchiectasis.    Pt is known to this service from bedside swallow evaluation completed 1/17/19 with rx to continue regular texture diet and thin liquids. Choose soft, moist foods. No overt, clinical s/s of laryngeal penetration/aspiration on exam. Patient reports a "slow" passage of food as she points to anterior portion of neck. Pt does endorse s/s of dysphagia prior to admit, "sometimes it kind of gets stuck but it works it's way down."

## 2022-03-30 NOTE — PROGRESS NOTE ADULT - PROBLEM SELECTOR PLAN 2
Pediatric Well Child Exam: 4 Years of age    Chief Complaint   Patient presents with   • Office Visit   • Well Child     4 yr wcc and needs flu vaccine. Needs school px.       SUBJECTIVE:  Yahir Calzada is a 4 year old male who presents for a  well child exam.  Patient presents with Father.    CONCERNS RAISED TODAY: Has some increased concern from the Problem Checklist but is being followed by [facility]. Dad reports noticing an improvement in behaviors.     DIET/GI:  Appetite: Good.  Milk: 2 Percent, 1-2 cups/day.  Food:   · Eats fruits/vegetables: [x]  YES     []  NO   · Eats meat: [x]  YES     []  NO   Problems with BM's: []  YES     [x]  NO    Issues with bowel hoarding has improved with  OT.     PHYSICAL ACTIVITY        Playtime (60 min/day): [x]  YES     []  NO         Electronic Screen time 1-2 hrs/day:     SLEEP PATTERN:   10-12 hours/night:.    SCHOOL HISTORY:  Facility Type: Attending Public School - name: [id number].  Grade in school:     VISION SCREENING:    Hearing Screening    125Hz 250Hz 500Hz 1000Hz 2000Hz 3000Hz 4000Hz 6000Hz 8000Hz   Right ear:            Left ear:               Visual Acuity Screening    Right eye Left eye Both eyes   Without correction: 20/30 20/40 20/30   With correction:          DEVELOPMENT:  [x]  YES    []  NO      []  UNKNOWN    Has success in school?  [x]  YES    []  NO      []  UNKNOWN    Has friends/does well socially?  [x]  YES    []  NO      []  UNKNOWN    Participates in after school/outside          activities?  [x]  YES    []  NO      []  UNKNOWN    Gets along with family?  [x]  YES    []  NO      []  UNKNOWN    Does chores when asked?  [x]  YES    []  NO      []  UNKNOWN    Given chances to make own decisions?  [x]  YES    []  NO      []  UNKNOWN    Feels good about self/generally happy?    OBJECTIVE:  PAST HISTORIES:  Allergies, Medications, Medical HX, Surgical HX, Family HX reviewed and updated.  Toxic Exposure:   Tobacco  Use: Never           IMMUNIZATION STATUS: Up to date per review of the EHR records.    IMMUNIZATION REACTIONS: None   VARICELLA STATUS: confirmed by vaccine administration    RECENT HEALTH EVENTS:  Illnesses: None.  Hospitalizations: None.  Injuries or Accidents: None.    REVIEW OF SYSTEMS:    All systems reviewed and negative except as documented in \"Concerns raised\".    PHYSICAL EXAM:      VITAL SIGNS:   Visit Vitals  BP (!) 87/49 (BP Location: RUE - Right upper extremity, Patient Position: Sitting, Cuff Size: Pediatric)   Pulse 79   Temp 98.6 °F (37 °C) (Temporal)   Resp 28   Ht 3' 8.69\" (1.135 m)   Wt 19.6 kg (43 lb 3.2 oz)   BMI 15.21 kg/m²    32 %ile (Z= -0.48) based on CDC (Boys, 2-20 Years) weight-for-age data using vitals from 3/30/2022.  GENERAL:  Well appearing male child.  Alert, active and consolable.  SKIN: Warm, normal turgor.  No cyanosis.  No rash.  HEAD:  Normocephalic, atraumatic.  EYES:  Conjunctivae appear normal, non-injected, non-icteric, positive red reflex.  NOSE:  Appears normal without drainage.  EARS:  Normal external auditory canals. TMs are transparent with normal landmarks.  THROAT:  Moist mucous membranes without lesions.  NECK:  Supple, no lymphadenopathy or masses.  HEART:  RRR.  Normal S1, S2.  No murmurs, rubs, gallops.  LUNGS:  Clear to auscultation.  No wheezes, rales, rhonchi.  Normal work effort with breathing.  ABDOMEN:  Bowel sounds present. Soft, nontender.  No hepatomegaly.  No splenomegaly.  No masses.  : Edmund stage 1. Bilateral testes without masses or hernia.  Rectum/anus patent.  EXTREMITIES: Normal bilateral range of motion of upper and lower extremities. Peripheral pulses 2/4. Equal femoral pulses.  BACK: Spine straight.  NEUROLOGIC:  Normal muscle tone and symmetrical strength.  Normal deep tendon reflexes.  Symmetrical facial motor function.      ASSESSMENT:  4 year old male well child.    PLAN:  1. All parental concerns and questions  discussed.  2. Anticipatory guidance provided, handout/s given Safety: car, bicycle,fire,sharp objects,falls, water  3. Development - PSC Score elevated but working with  and noting improvement.   4. Diet  5. Discipline  6. Television  7. Analgesics/antipyretics  8. Sun exposure  9. Tobacco-free home  10. Dental care  11. Lead exposure risk: None  12. Immunizations per orders.  Risks, benefits, and side effects discussed.  13.    Orders for immunizations given today per the recommended schedule.  14. VIS for Immunizations given    Follow up:Return in about 1 year (around 3/30/2023) for 5 year [facility].       - patient with persistent severe asthma  - as above, nebulizers and increased steroids to solumedrol 20 q 8 hrs  - patient on IL4 MAB q 2 weeks (Dupixent) --> due again in roughly 2 weeks - patient with persistent severe asthma  - as above, nebulizers and steroids  - patient on IL4 MAB q 2 weeks (Dupixent) --> due again in roughly 2 weeks

## 2022-03-30 NOTE — SWALLOW BEDSIDE ASSESSMENT ADULT - SWALLOW EVAL: PATIENT/FAMILY GOALS STATEMENT
Daughter in agreement with FEES to r/o silent aspiration. Pt denied dysphagia though endorsed food goes down slow, but eventually passes.

## 2022-03-30 NOTE — PROGRESS NOTE ADULT - TIME BILLING
as above:  ENT evaln-thrush--diflucan in place for 7 days (D2)  multifactorial dyspnea-TBM, CB, severe persistent asthma, ? PNA, thrush , debility, anemia, CAD--O2 NC sat above 90%  ?PNA/bronchiectasis-f/up sputum cx-prior pseudomonas- (ID formal evaln-RISHABH Liz/Girish et al)-on cefepime/vanco D3  TBM-CB/brochiectasis-acapella/vest rx, incentive spirometry--? fob for additional sputum?--utilize vest rx  asthma-medrol 4 mg (chronic dosing) , spiriva/symbicort, duoneb q 6, singulair 10 q hs,  allergy-claritin/flonase                                 *****dysphonia/VC dysfunction-thrush-ENT evaln/swallow evaln as well  gerd-ppi  abnormal CT-nodule-f/up 3 months                    cards-on mult rx-to continue  PT-OOB    DVT prophylaxis  DC planning-will need extensive rehab    Eulalio Allison MD-Pulmonary   [phone].

## 2022-03-30 NOTE — PROGRESS NOTE ADULT - SUBJECTIVE AND OBJECTIVE BOX
CHIEF COMPLAINT: f/up sob, chronic resp failure, TBM, severe asthma, allergic rhinitis-slightly better over all-less cough    Interval Events: vest rx, ENT-VC w/ extensive thrush (VC working)    REVIEW OF SYSTEMS:  Constitutional: No fevers or chills. No weight loss. + fatigue or generalized malaise.  Eyes: No itching or discharge from the eyes  ENT: No ear pain. No ear discharge. No nasal congestion. No post nasal drip. No epistaxis. No throat pain. No sore throat. No difficulty swallowing.   CV: No chest pain. No palpitations. No lightheadedness or dizziness.   Resp: No dyspnea at rest. + dyspnea on exertion. No orthopnea. No wheezing. No cough. No stridor. No sputum production. No chest pain with respiration.  GI: No nausea. No vomiting. No diarrhea.  MSK: No joint pain or pain in any extremities  Integumentary: No skin lesions. No pedal edema.  Neurological: + gross motor weakness. No sensory changes.  [ +] All other systems negative  [ ] Unable to assess ROS because ________    OBJECTIVE:  ICU Vital Signs Last 24 Hrs  T(C): 37 (30 Mar 2022 04:37), Max: 37.2 (30 Mar 2022 00:00)  T(F): 98.6 (30 Mar 2022 04:37), Max: 98.9 (30 Mar 2022 00:00)  HR: 96 (30 Mar 2022 04:37) (84 - 98)  BP: 153/73 (30 Mar 2022 04:37) (126/57 - 153/73)  BP(mean): --  ABP: --  ABP(mean): --  RR: 18 (30 Mar 2022 04:37) (18 - 20)  SpO2: 95% (30 Mar 2022 04:37) (94% - 99%)        03-29 @ 07:01  -  -30 @ 05:14  --------------------------------------------------------  IN: 0 mL / OUT: 1 mL / NET: -1 mL      CAPILLARY BLOOD GLUCOSE      POCT Blood Glucose.: 304 mg/dL (29 Mar 2022 23:28)      PHYSICAL EXAM: NAD in bed on NC O2  General: Awake, alert, oriented X 3.   HEENT: Atraumatic, normocephalic.                 Mallampatti Grade 2                No nasal congestion.                No tonsillar or pharyngeal exudates.  Lymph Nodes: No palpable lymphadenopathy  Neck: No JVD. No carotid bruit.   Respiratory: Normal chest expansion                         Normal percussion                         Normal and equal air entry                         rare exp wheeze,no rhonchi or rales.  Cardiovascular: S1 S2 normal. No murmurs, rubs or gallops.   Abdomen: Soft, non-tender, non-distended. No organomegaly. Normoactive bowel sounds.  Extremities: Warm to touch. Peripheral pulse palpable. No pedal edema.   Skin: No rashes or skin lesions  Neurological: Motor and sensory examination equal and normal in all four extremities.  Psychiatry: Appropriate mood and affect.    HOSPITAL MEDICATIONS:  MEDICATIONS  (STANDING):  albuterol/ipratropium for Nebulization 3 milliLiter(s) Nebulizer every 6 hours  apixaban 5 milliGRAM(s) Oral every 12 hours  atorvastatin 20 milliGRAM(s) Oral at bedtime  cefepime   IVPB 2000 milliGRAM(s) IV Intermittent every 8 hours  chlorhexidine 2% Cloths 1 Application(s) Topical <User Schedule>  dextrose 5%. 1000 milliLiter(s) (50 mL/Hr) IV Continuous <Continuous>  dextrose 5%. 1000 milliLiter(s) (100 mL/Hr) IV Continuous <Continuous>  dextrose 50% Injectable 25 Gram(s) IV Push once  dextrose 50% Injectable 12.5 Gram(s) IV Push once  dextrose 50% Injectable 25 Gram(s) IV Push once  glucagon  Injectable 1 milliGRAM(s) IntraMuscular once  influenza  Vaccine (HIGH DOSE) 0.7 milliLiter(s) IntraMuscular once  insulin glargine Injectable (LANTUS) 10 Unit(s) SubCutaneous at bedtime  insulin lispro (ADMELOG) corrective regimen sliding scale   SubCutaneous three times a day before meals  insulin lispro (ADMELOG) corrective regimen sliding scale   SubCutaneous at bedtime  methylPREDNISolone 4 milliGRAM(s) Oral daily  mexiletine 200 milliGRAM(s) Oral three times a day  mupirocin 2% Ointment 1 Application(s) Both Nostrils two times a day  pantoprazole    Tablet 40 milliGRAM(s) Oral before breakfast  pregabalin 150 milliGRAM(s) Oral two times a day  sodium chloride 7% Inhalation 4 milliLiter(s) Inhalation every 12 hours  vancomycin  IVPB 1000 milliGRAM(s) IV Intermittent <User Schedule>    MEDICATIONS  (PRN):  acetaminophen     Tablet .. 975 milliGRAM(s) Oral every 6 hours PRN Temp greater or equal to 38C (100.4F), Mild Pain (1 - 3)  dextrose Oral Gel 15 Gram(s) Oral once PRN Blood Glucose LESS THAN 70 milliGRAM(s)/deciliter  ondansetron Injectable 4 milliGRAM(s) IV Push every 8 hours PRN Nausea and/or Vomiting      LABS:                        11.4   8.89  )-----------( 176      ( 29 Mar 2022 09:41 )             35.1         137  |  100  |  25<H>  ----------------------------<  357<H>  4.1   |  26  |  0.48<L>    Ca    8.7      29 Mar 2022 22:49  Phos  3.1       Mg     1.9         TPro  5.7<L>  /  Alb  2.6<L>  /  TBili  0.4  /  DBili  x   /  AST  31  /  ALT  42  /  AlkPhos  96        Urinalysis Basic - ( 28 Mar 2022 18:13 )    Color: Yellow / Appearance: Slightly Turbid / S.025 / pH: x  Gluc: x / Ketone: Small  / Bili: Negative / Urobili: 2 mg/dL   Blood: x / Protein: 100 / Nitrite: Negative   Leuk Esterase: Large / RBC: 26 /hpf /  /HPF   Sq Epi: x / Non Sq Epi: 1 /hpf / Bacteria: Few      Arterial Blood Gas:   @ 04:42  7.47/40/66/29/95.3/5.0  ABG lactate: --    Venous Blood Gas:   @ 09:36  7.37/44/49/25/81.9  VBG Lactate: 1.6  Venous Blood Gas:   @ 17:22  7.46/45/26/32/40.5  VBG Lactate: 2.1      MICROBIOLOGY:     RADIOLOGY:  [ ] Reviewed and interpreted by me    Point of Care Ultrasound Findings:    PFT:    EKG:

## 2022-03-30 NOTE — SWALLOW BEDSIDE ASSESSMENT ADULT - ORAL PHASE
Intermittent bolus holding Decreased anterior-posterior movement of the bolus/Delayed oral transit time Mild palatal stasis (clears with thin liquid wash)/Decreased anterior-posterior movement of the bolus

## 2022-03-30 NOTE — SWALLOW BEDSIDE ASSESSMENT ADULT - SWALLOW EVAL: DIAGNOSIS
72 y/o F admitted with severe SEPSIS and acute hypoxic respiratory failure possibly from PNA and concomitant bronchiectasis exacerbation. ENT following for aphonia, found to have diffuse oropharynx thrush with normal vocal cord function (recommending Diflucan x 7 days). Pt was seen today for bedside swallow evaluation which revealed oral dysphagia & suspected pharyngeal dysphagia. The swallow sequence was characterized by reduced oral grading, prolonged mastication with mild oral stasis with minced and moist trial, reduced AP bolus transfer of puree, intermittent bolus holding with thin liquids, suspected delayed trigger of pharyngeal swallow, and reduced hyolaryngeal elevation upon palpation. Although no overt clinical s/s of aspiration or penetration observed with thin liquids, puree, or minced and moist trial, recommend FEES to r/o silent aspiration given new RLL consolidation on chest imaging a/b productive cough, vomiting, SOB, & lethargy.

## 2022-03-30 NOTE — PROGRESS NOTE ADULT - PROBLEM SELECTOR PLAN 9
started after extubation at [facility]  - ENT karen, appreciate assistance started after extubation at [facility]  - ENT eval, appreciate assistance: laryngoscope at bedside with diffuse yellow patches of debris ?thrush  - diflucan 200 mg IV first dose, then diflucan 100 mg IV x6 days then ENT will rescope after treatment started after extubation at [facility]  - ENT eval, appreciate assistance: laryngoscope at bedside with diffuse yellow patches of debris ?thrush  - diflucan 200 mg IV first dose, then diflucan 100 mg IV x6 days then ENT will rescope after treatment () started after extubation at [facility]  - ENT eval, appreciate assistance: laryngoscope at bedside with diffuse yellow patches of debris ?thrush  - diflucan 200 mg IV first dose, then diflucan 100 mg IV x6 days then ENT will rescope after treatment ()  - FEES scan, pending

## 2022-03-30 NOTE — PROGRESS NOTE ADULT - PROBLEM SELECTOR PLAN 2
Multifactorial in setting of pneumonia, bronchiectasis exacerbation, likely asthma exacerbation. PE on differential however she has been on therapeutic AC for afib, patient has contrast to allergy with SOB as reaction. Unclear history of COPD, states her sister smokes and she has secondhand exposure, however COPD diagnosis was never fully established however she does have known severe persistent asthma. Discharged to rehab on O2, unclear how much but not on O2 usually at home.   - CT chest 3/29: worsening extensive bronchial impaction and tree in bud nodularity with new right lower lobe consolidation iso bronchiectasis. diffuse bronchial mucoid impaction  - Wean O2 as tolerated  - decreased methylprednisolone 4 mg qd (from 8 mg daily 3/29)  - Sputum Cx, will induce with hypertonic sal  - aggressive pulmonary toilet hygiene: chest vest, acapella, incentive spirometry, duonebs q6, hypertonic sal q12  - pulmonary Dr. Allison following Multifactorial in setting of pneumonia, bronchiectasis exacerbation, likely asthma exacerbation. PE on differential however she has been on therapeutic AC for afib, patient has contrast to allergy with SOB as reaction. Unclear history of COPD, states her sister smokes and she has secondhand exposure, however COPD diagnosis was never fully established however she does have known severe persistent asthma. Discharged to rehab on O2, unclear how much but not on O2 usually at home.   - CT chest 3/29: worsening extensive bronchial impaction and tree in bud nodularity with new right lower lobe consolidation iso bronchiectasis. diffuse bronchial mucoid impaction  - Wean O2 as tolerated  - decreased methylprednisolone 4 mg qd (from 8 mg daily 3/29), her chronic home dose  - Sputum Cx, will induce with hypertonic sal  - aggressive pulmonary toilet hygiene: chest vest, acapella, incentive spirometry, duonebs q6, hypertonic sal q12  - pulmonary Dr. Allison following

## 2022-03-30 NOTE — SWALLOW BEDSIDE ASSESSMENT ADULT - ADDITIONAL RECOMMENDATIONS
GOALS:  1. Pt will participate in Fiberoptic Endoscopic Evaluation of Swallow (FEES) to objectively evaluate pt's swallow fx and r/o silent aspiration.   2. Pt/family/caregiver will demonstrate understanding and carryover of dysphagia management (safe swallow guidelines, compensatory strategies, dysphagia diet).  3. Pt will tolerate recommended diet with no overt, clinical s/s of aspiration.

## 2022-03-30 NOTE — PROGRESS NOTE ADULT - PROBLEM SELECTOR PLAN 1
Febrile, tachycardia, tachypnea, lactate 2.1. Associated AHRF, productive cough. Recent hospital stay with intubation for pneumonia at OSH and intubated, unclear which antibiotics were given and duration of treatment. Likely in setting of pneumonia and bronchiectasis exacerbation. History of sputum Cx +Pseudomonas (Feb 2020) via bronchoscopy. Also with UA grossly positive, however pt without symptoms  - S/p cefepime and vancomycin in ED, 500 cc bolus, MRSA+, RVP neg  - Cont cefepime for Pseudomonas coverage and vancomycin given recent intubation (3/29 - )  - BCx, UCx pending  - Sputum Cx pending  - Consider ID consult

## 2022-03-30 NOTE — PROGRESS NOTE ADULT - PROBLEM SELECTOR PLAN 3
Multiple episodes of vomiting. Pt reported constipation at rehab and was on lactulose with resolution of constipation. Differential includes C diff given recent Abx use and hospital stay however abdominal exam benign. Possibly in setting of gastroparesis  - Hold C diff testing unless abdominal exam becomes concerning or persistent diarrhea  - PPI 40 mg PO daily

## 2022-03-30 NOTE — SWALLOW BEDSIDE ASSESSMENT ADULT - SLP PERTINENT HISTORY OF CURRENT PROBLEM
72 y/o F with PMH of tracheobronchomalasia s/p tracheobronchoplasty, bronchiectasis, severe persistent asthma (steroid dependent), adrenal insuffiencey on chronic steroids, DM2, HTN, colorectal cancer s/p total colectomy now with colostomy, PAF on Eliquis p/w x3 weeks of productive cough, bilous vomiting, SOB, and lethargy. Pt was originally admitted to [facility] on March 8th and treated for PNA, given antibiotics, stay c/b intubation 2/2 hypoxia, and d/c'd on 3/25/22 with O2 back to rehab (required 3-5L NC, no O2 requirement at baseline). For past 2 days at rehab, pt with worsening SOB, productive cough with white/yellow phlegm, episodes of bilious emesis, increasing lethargy, and febrile (Tmax 105F per daughter).

## 2022-03-30 NOTE — PROGRESS NOTE ADULT - PROBLEM SELECTOR PLAN 6
Last A1c 7.8. Pt uses lantus 15 u qhs and Novolog varying dose depending on sugar, however was unable to elaborate on a usual dose of Novolog. Has been eating pureed diet due to weakness since last hospital stay  - Lantus 10 u qhs  - Low-dose ISS  - Monitor off prandial insulin until PO intake established Last A1c 7.8. Pt uses lantus 15 u qhs and Novolog varying dose depending on sugar, however was unable to elaborate on a usual dose of Novolog. Has been eating pureed diet due to weakness since last hospital stay  - Lantus 10 u qhs  - admelog 3 units  - moderate-dose ISS  - Monitor off prandial insulin until PO intake established

## 2022-03-30 NOTE — SWALLOW BEDSIDE ASSESSMENT ADULT - SLP GENERAL OBSERVATIONS
Pt was received at bedside awake. +NC (3L), +aphonia (diffuse dorothea-pharyngeal thrush; normal vocal cord function per ENT). Overall appeared weak and deconditioned. She was AAOx2 to self and general location. Stated incorrect date (8/27/22). And unable to recall reason for hospital admission. Pt was lethargic. Inconsistent ability to follow basic commands (benefitted from cueing). Inconsistent response to questioning. Required frequent repetition.

## 2022-03-31 DIAGNOSIS — E16.2 HYPOGLYCEMIA, UNSPECIFIED: ICD-10-CM

## 2022-03-31 LAB
-  AMPICILLIN/SULBACTAM: SIGNIFICANT CHANGE UP
-  CEFAZOLIN: SIGNIFICANT CHANGE UP
-  CLINDAMYCIN: SIGNIFICANT CHANGE UP
-  ERYTHROMYCIN: SIGNIFICANT CHANGE UP
-  GENTAMICIN: SIGNIFICANT CHANGE UP
-  LINEZOLID: SIGNIFICANT CHANGE UP
-  OXACILLIN: SIGNIFICANT CHANGE UP
-  PENICILLIN: SIGNIFICANT CHANGE UP
-  RIFAMPIN: SIGNIFICANT CHANGE UP
-  TETRACYCLINE: SIGNIFICANT CHANGE UP
-  TRIMETHOPRIM/SULFAMETHOXAZOLE: SIGNIFICANT CHANGE UP
-  VANCOMYCIN: SIGNIFICANT CHANGE UP
ALBUMIN SERPL ELPH-MCNC: 2.3 G/DL — LOW (ref 3.3–5)
ALP SERPL-CCNC: 85 U/L — SIGNIFICANT CHANGE UP (ref 40–120)
ALT FLD-CCNC: 64 U/L — HIGH (ref 10–45)
ANION GAP SERPL CALC-SCNC: 11 MMOL/L — SIGNIFICANT CHANGE UP (ref 5–17)
ANISOCYTOSIS BLD QL: SLIGHT — SIGNIFICANT CHANGE UP
AST SERPL-CCNC: 54 U/L — HIGH (ref 10–40)
BASOPHILS # BLD AUTO: 0 K/UL — SIGNIFICANT CHANGE UP (ref 0–0.2)
BASOPHILS NFR BLD AUTO: 0 % — SIGNIFICANT CHANGE UP (ref 0–2)
BILIRUB SERPL-MCNC: 0.3 MG/DL — SIGNIFICANT CHANGE UP (ref 0.2–1.2)
BUN SERPL-MCNC: 15 MG/DL — SIGNIFICANT CHANGE UP (ref 7–23)
CALCIUM SERPL-MCNC: 8 MG/DL — LOW (ref 8.4–10.5)
CHLORIDE SERPL-SCNC: 96 MMOL/L — SIGNIFICANT CHANGE UP (ref 96–108)
CO2 SERPL-SCNC: 27 MMOL/L — SIGNIFICANT CHANGE UP (ref 22–31)
CREAT SERPL-MCNC: 0.4 MG/DL — LOW (ref 0.5–1.3)
CULTURE RESULTS: SIGNIFICANT CHANGE UP
CULTURE RESULTS: SIGNIFICANT CHANGE UP
DACRYOCYTES BLD QL SMEAR: SLIGHT — SIGNIFICANT CHANGE UP
EGFR: 104 ML/MIN/1.73M2 — SIGNIFICANT CHANGE UP
EOSINOPHIL # BLD AUTO: 0 K/UL — SIGNIFICANT CHANGE UP (ref 0–0.5)
EOSINOPHIL NFR BLD AUTO: 0 % — SIGNIFICANT CHANGE UP (ref 0–6)
GLUCOSE BLDC GLUCOMTR-MCNC: 233 MG/DL — HIGH (ref 70–99)
GLUCOSE BLDC GLUCOMTR-MCNC: 239 MG/DL — HIGH (ref 70–99)
GLUCOSE BLDC GLUCOMTR-MCNC: 293 MG/DL — HIGH (ref 70–99)
GLUCOSE BLDC GLUCOMTR-MCNC: 294 MG/DL — HIGH (ref 70–99)
GLUCOSE SERPL-MCNC: 286 MG/DL — HIGH (ref 70–99)
GRAM STN FLD: SIGNIFICANT CHANGE UP
HCT VFR BLD CALC: 32.8 % — LOW (ref 34.5–45)
HGB BLD-MCNC: 10.1 G/DL — LOW (ref 11.5–15.5)
HYPOCHROMIA BLD QL: SLIGHT — SIGNIFICANT CHANGE UP
LACTATE SERPL-SCNC: 1.6 MMOL/L — SIGNIFICANT CHANGE UP (ref 0.7–2)
LYMPHOCYTES # BLD AUTO: 0.19 K/UL — LOW (ref 1–3.3)
LYMPHOCYTES # BLD AUTO: 2.7 % — LOW (ref 13–44)
MAGNESIUM SERPL-MCNC: 1.6 MG/DL — SIGNIFICANT CHANGE UP (ref 1.6–2.6)
MANUAL SMEAR VERIFICATION: SIGNIFICANT CHANGE UP
MCHC RBC-ENTMCNC: 22.5 PG — LOW (ref 27–34)
MCHC RBC-ENTMCNC: 30.8 GM/DL — LOW (ref 32–36)
MCV RBC AUTO: 73.1 FL — LOW (ref 80–100)
METAMYELOCYTES # FLD: 0.9 % — HIGH (ref 0–0)
METHOD TYPE: SIGNIFICANT CHANGE UP
MONOCYTES # BLD AUTO: 0.06 K/UL — SIGNIFICANT CHANGE UP (ref 0–0.9)
MONOCYTES NFR BLD AUTO: 0.9 % — LOW (ref 2–14)
MYELOCYTES NFR BLD: 0.9 % — HIGH (ref 0–0)
NEUTROPHILS # BLD AUTO: 6.54 K/UL — SIGNIFICANT CHANGE UP (ref 1.8–7.4)
NEUTROPHILS NFR BLD AUTO: 89.2 % — HIGH (ref 43–77)
NEUTS BAND # BLD: 5.4 % — SIGNIFICANT CHANGE UP (ref 0–8)
ORGANISM # SPEC MICROSCOPIC CNT: SIGNIFICANT CHANGE UP
ORGANISM # SPEC MICROSCOPIC CNT: SIGNIFICANT CHANGE UP
PHOSPHATE SERPL-MCNC: 2.2 MG/DL — LOW (ref 2.5–4.5)
PLAT MORPH BLD: NORMAL — SIGNIFICANT CHANGE UP
PLATELET # BLD AUTO: 165 K/UL — SIGNIFICANT CHANGE UP (ref 150–400)
POIKILOCYTOSIS BLD QL AUTO: SLIGHT — SIGNIFICANT CHANGE UP
POTASSIUM SERPL-MCNC: 4.4 MMOL/L — SIGNIFICANT CHANGE UP (ref 3.5–5.3)
POTASSIUM SERPL-SCNC: 4.4 MMOL/L — SIGNIFICANT CHANGE UP (ref 3.5–5.3)
PROT SERPL-MCNC: 5.5 G/DL — LOW (ref 6–8.3)
RBC # BLD: 4.49 M/UL — SIGNIFICANT CHANGE UP (ref 3.8–5.2)
RBC # FLD: 19.1 % — HIGH (ref 10.3–14.5)
RBC BLD AUTO: ABNORMAL
SODIUM SERPL-SCNC: 134 MMOL/L — LOW (ref 135–145)
SPECIMEN SOURCE: SIGNIFICANT CHANGE UP
SPECIMEN SOURCE: SIGNIFICANT CHANGE UP
T4 FREE SERPL-MCNC: 1.5 NG/DL — SIGNIFICANT CHANGE UP (ref 0.9–1.8)
TARGETS BLD QL SMEAR: SIGNIFICANT CHANGE UP
TSH SERPL-MCNC: 1.06 UIU/ML — SIGNIFICANT CHANGE UP (ref 0.27–4.2)
VANCOMYCIN TROUGH SERPL-MCNC: 10.3 UG/ML — SIGNIFICANT CHANGE UP (ref 10–20)
WBC # BLD: 6.91 K/UL — SIGNIFICANT CHANGE UP (ref 3.8–10.5)
WBC # FLD AUTO: 6.91 K/UL — SIGNIFICANT CHANGE UP (ref 3.8–10.5)

## 2022-03-31 PROCEDURE — 99223 1ST HOSP IP/OBS HIGH 75: CPT

## 2022-03-31 PROCEDURE — 99232 SBSQ HOSP IP/OBS MODERATE 35: CPT

## 2022-03-31 PROCEDURE — 99233 SBSQ HOSP IP/OBS HIGH 50: CPT | Mod: GC

## 2022-03-31 RX ORDER — ZINC SULFATE TAB 220 MG (50 MG ZINC EQUIVALENT) 220 (50 ZN) MG
220 TAB ORAL DAILY
Refills: 0 | Status: DISCONTINUED | OUTPATIENT
Start: 2022-03-31 | End: 2022-04-07

## 2022-03-31 RX ORDER — ASCORBIC ACID 60 MG
500 TABLET,CHEWABLE ORAL DAILY
Refills: 0 | Status: DISCONTINUED | OUTPATIENT
Start: 2022-03-31 | End: 2022-04-07

## 2022-03-31 RX ORDER — FLUCONAZOLE 150 MG/1
200 TABLET ORAL EVERY 24 HOURS
Refills: 0 | Status: DISCONTINUED | OUTPATIENT
Start: 2022-03-31 | End: 2022-04-05

## 2022-03-31 RX ORDER — SODIUM,POTASSIUM PHOSPHATES 278-250MG
1 POWDER IN PACKET (EA) ORAL ONCE
Refills: 0 | Status: COMPLETED | OUTPATIENT
Start: 2022-03-31 | End: 2022-03-31

## 2022-03-31 RX ADMIN — CHLORHEXIDINE GLUCONATE 1 APPLICATION(S): 213 SOLUTION TOPICAL at 06:49

## 2022-03-31 RX ADMIN — SODIUM CHLORIDE 4 MILLILITER(S): 9 INJECTION INTRAMUSCULAR; INTRAVENOUS; SUBCUTANEOUS at 06:11

## 2022-03-31 RX ADMIN — CEFEPIME 100 MILLIGRAM(S): 1 INJECTION, POWDER, FOR SOLUTION INTRAMUSCULAR; INTRAVENOUS at 23:44

## 2022-03-31 RX ADMIN — Medication 975 MILLIGRAM(S): at 23:43

## 2022-03-31 RX ADMIN — Medication 3 MILLILITER(S): at 06:11

## 2022-03-31 RX ADMIN — Medication 3 MILLILITER(S): at 00:22

## 2022-03-31 RX ADMIN — Medication 150 MILLIGRAM(S): at 06:51

## 2022-03-31 RX ADMIN — Medication 3: at 09:12

## 2022-03-31 RX ADMIN — Medication 3 MILLILITER(S): at 13:12

## 2022-03-31 RX ADMIN — CEFEPIME 100 MILLIGRAM(S): 1 INJECTION, POWDER, FOR SOLUTION INTRAMUSCULAR; INTRAVENOUS at 08:30

## 2022-03-31 RX ADMIN — Medication 2: at 13:11

## 2022-03-31 RX ADMIN — APIXABAN 5 MILLIGRAM(S): 2.5 TABLET, FILM COATED ORAL at 18:04

## 2022-03-31 RX ADMIN — Medication 8 MILLIGRAM(S): at 06:10

## 2022-03-31 RX ADMIN — Medication 975 MILLIGRAM(S): at 23:13

## 2022-03-31 RX ADMIN — CEFEPIME 100 MILLIGRAM(S): 1 INJECTION, POWDER, FOR SOLUTION INTRAMUSCULAR; INTRAVENOUS at 18:18

## 2022-03-31 RX ADMIN — Medication 1 PACKET(S): at 10:22

## 2022-03-31 RX ADMIN — FLUCONAZOLE 50 MILLIGRAM(S): 150 TABLET ORAL at 00:22

## 2022-03-31 RX ADMIN — MEXILETINE HYDROCHLORIDE 200 MILLIGRAM(S): 150 CAPSULE ORAL at 13:12

## 2022-03-31 RX ADMIN — APIXABAN 5 MILLIGRAM(S): 2.5 TABLET, FILM COATED ORAL at 06:11

## 2022-03-31 RX ADMIN — MUPIROCIN 1 APPLICATION(S): 20 OINTMENT TOPICAL at 06:49

## 2022-03-31 RX ADMIN — MUPIROCIN 1 APPLICATION(S): 20 OINTMENT TOPICAL at 18:18

## 2022-03-31 RX ADMIN — SODIUM CHLORIDE 4 MILLILITER(S): 9 INJECTION INTRAMUSCULAR; INTRAVENOUS; SUBCUTANEOUS at 18:04

## 2022-03-31 RX ADMIN — MEXILETINE HYDROCHLORIDE 200 MILLIGRAM(S): 150 CAPSULE ORAL at 06:10

## 2022-03-31 RX ADMIN — SENNA PLUS 2 TABLET(S): 8.6 TABLET ORAL at 22:38

## 2022-03-31 RX ADMIN — PANTOPRAZOLE SODIUM 40 MILLIGRAM(S): 20 TABLET, DELAYED RELEASE ORAL at 06:12

## 2022-03-31 RX ADMIN — ATORVASTATIN CALCIUM 20 MILLIGRAM(S): 80 TABLET, FILM COATED ORAL at 22:39

## 2022-03-31 RX ADMIN — INSULIN GLARGINE 10 UNIT(S): 100 INJECTION, SOLUTION SUBCUTANEOUS at 22:41

## 2022-03-31 RX ADMIN — Medication 3: at 18:01

## 2022-03-31 RX ADMIN — MEXILETINE HYDROCHLORIDE 200 MILLIGRAM(S): 150 CAPSULE ORAL at 22:41

## 2022-03-31 RX ADMIN — Medication 3 MILLILITER(S): at 18:03

## 2022-03-31 RX ADMIN — POLYETHYLENE GLYCOL 3350 17 GRAM(S): 17 POWDER, FOR SOLUTION ORAL at 22:38

## 2022-03-31 RX ADMIN — Medication 150 MILLIGRAM(S): at 18:04

## 2022-03-31 RX ADMIN — CEFEPIME 100 MILLIGRAM(S): 1 INJECTION, POWDER, FOR SOLUTION INTRAMUSCULAR; INTRAVENOUS at 00:57

## 2022-03-31 RX ADMIN — Medication 250 MILLIGRAM(S): at 13:58

## 2022-03-31 RX ADMIN — Medication 3 MILLILITER(S): at 23:44

## 2022-03-31 NOTE — PROGRESS NOTE ADULT - PROBLEM SELECTOR PLAN 11
DVT: Eliquis  Diet: pureed, thin liquids, consistent carb diet, glucerna shake 3/day, zinc, MVI, and vitamin C.  speech/swallow eval passed, nutrition consult, appreciate recs  PT: MADISON  LDA: colostomy, PIV  Code: Full code

## 2022-03-31 NOTE — SWALLOW FEES ASSESSMENT ADULT - SLP PERTINENT HISTORY OF CURRENT PROBLEM
72 y/o F with PMH of tracheobronchomalasia s/p tracheobronchoplasty, bronchiectasis, severe persistent asthma (steroid dependent), adrenal insuffiencey on chronic steroids, DM2, HTN, colorectal cancer s/p total colectomy now with colostomy, PAF on Eliquis p/w x3 weeks of productive cough, bilous vomiting, SOB, and lethargy. Pt was originally admitted to [facility] on March 8th and treated for PNA, given antibiotics, stay c/b intubation 2/2 hypoxia, and d/c'd on 3/25/22 with O2 back to rehab (required 3-5L NC, no O2 requirement at baseline). For past 2 days at rehab, pt with worsening SOB, productive cough with white/yellow phlegm, episodes of bilious emesis, increasing lethargy, and febrile (Tmax 105F per daughter).

## 2022-03-31 NOTE — DIETITIAN INITIAL EVALUATION ADULT. - FEEDING ASSISTANCE
Provide assistance with meals as needed to promote adequate PO intake. Encourage adequate consumption of meals/supplements to optimize protein-energy intake

## 2022-03-31 NOTE — DIETITIAN INITIAL EVALUATION ADULT. - PERTINENT MEDS FT
MEDICATIONS  (STANDING):  albuterol/ipratropium for Nebulization 3 milliLiter(s) Nebulizer every 6 hours  apixaban 5 milliGRAM(s) Oral every 12 hours  atorvastatin 20 milliGRAM(s) Oral at bedtime  cefepime   IVPB 2000 milliGRAM(s) IV Intermittent every 8 hours  chlorhexidine 2% Cloths 1 Application(s) Topical <User Schedule>  dextrose 5%. 1000 milliLiter(s) (50 mL/Hr) IV Continuous <Continuous>  dextrose 5%. 1000 milliLiter(s) (100 mL/Hr) IV Continuous <Continuous>  dextrose 50% Injectable 25 Gram(s) IV Push once  dextrose 50% Injectable 12.5 Gram(s) IV Push once  dextrose 50% Injectable 25 Gram(s) IV Push once  fluconAZOLE IVPB 100 milliGRAM(s) IV Intermittent every 24 hours  glucagon  Injectable 1 milliGRAM(s) IntraMuscular once  influenza  Vaccine (HIGH DOSE) 0.7 milliLiter(s) IntraMuscular once  insulin glargine Injectable (LANTUS) 10 Unit(s) SubCutaneous at bedtime  insulin lispro (ADMELOG) corrective regimen sliding scale   SubCutaneous three times a day before meals  insulin lispro (ADMELOG) corrective regimen sliding scale   SubCutaneous at bedtime  methylPREDNISolone 8 milliGRAM(s) Oral daily  mexiletine 200 milliGRAM(s) Oral three times a day  mupirocin 2% Ointment 1 Application(s) Both Nostrils two times a day  pantoprazole    Tablet 40 milliGRAM(s) Oral before breakfast  polyethylene glycol 3350 17 Gram(s) Oral daily  pregabalin 150 milliGRAM(s) Oral two times a day  senna 2 Tablet(s) Oral at bedtime  sodium chloride 7% Inhalation 4 milliLiter(s) Inhalation every 12 hours  vancomycin  IVPB 1000 milliGRAM(s) IV Intermittent <User Schedule>    MEDICATIONS  (PRN):  acetaminophen     Tablet .. 975 milliGRAM(s) Oral every 6 hours PRN Temp greater or equal to 38C (100.4F), Mild Pain (1 - 3)  dextrose Oral Gel 15 Gram(s) Oral once PRN Blood Glucose LESS THAN 70 milliGRAM(s)/deciliter  ondansetron Injectable 4 milliGRAM(s) IV Push every 8 hours PRN Nausea and/or Vomiting

## 2022-03-31 NOTE — SWALLOW FEES ASSESSMENT ADULT - ORAL PHASE
Mildly prolonged though generally efficient; Mild spillover to pyriform sinus (L>R); Piecemeal deglutition (x2) Mildly reduced AP transfer WFL Mild spillover (L>R) to pyriform sinus

## 2022-03-31 NOTE — DIETITIAN INITIAL EVALUATION ADULT. - REASON FOR ADMISSION
Pt is 72 y/o F with PMH as per chart: "with history of tracheobronchomalasia s/p tracheobronchoplasty, bronchiectasis, severe persistent asthma (steroid dependent), adrenal insuffiencey on chronic steroids, DM2, HTN, colorectal cancer s/p total colectomy now with colostomy, PAF on Eliquis presenting with about 3 weeks of productive cough, bilous vomiting, shortness of breath and lethargy. Pt was originally admitted to [facility] on March 8th and treated for pna, given antibiotics, stay c/b intubation 2/2 hypoxia, and discharged on Friday March 25th with oxygen back to rehab (required 3-5L NC, no O2 requirement at baseline). For past 2 days at rehab, pt with worsening SOB, productive cough with white/yellow phlegm, episodes of bilious emesis, increasing lethargy, and febrile (Tmax 105F per daughter). Pt denies headache, vision changes, chills, abdominal pain, palpitations, dysuria, changes in urination, or bloody stools"

## 2022-03-31 NOTE — PROGRESS NOTE ADULT - TIME BILLING
as above:  no new issues--s/p ENT evaln-thrush--diflucan in place for 7 days (D3)  multifactorial dyspnea-TBM, CB, severe persistent asthma, ? PNA, thrush , debility, anemia, CAD--O2 NC sat above 90%  ?PNA/bronchiectasis-f/up sputum cx-prior pseudomonas- (ID formal evaln-RISHABH Liz/Girish et al)-on cefepime/vanco D4  TBM-CB/brochiectasis-acapella/vest rx, incentive spirometry--? fob for additional sputum?--utilize vest rx  asthma-medrol 4 mg (chronic dosing) , spiriva/symbicort, duoneb q 6, singulair 10 q hs, hypertonic saline  allergy-claritin/flonase                                 *****dysphonia/VC dysfunction-thrush-ENT evaln/swallow evaln as well  gerd-ppi  abnormal CT-nodule-f/up 3 months                    cards-on mult rx-to continue  PT-OOB    DVT prophylaxis  DC planning-will need extensive rehab      (all inhaled meds and eating must be done in chair-efficacy and asp. risk)    Eulalio Allison MD-Pulmonary   [phone].

## 2022-03-31 NOTE — PROGRESS NOTE ADULT - PROBLEM SELECTOR PLAN 9
started after extubation at [facility]  - ENT eval, appreciate assistance: laryngoscope at bedside with diffuse yellow patches of debris ?thrush  - diflucan 200 mg IV first dose, then diflucan 100 mg IV x6 days then ENT will rescope after treatment ()  - FEES scan, pending started after extubation at [facility]  - ENT eval, appreciate assistance: laryngoscope at bedside with diffuse yellow patches of debris ?thrush  - diflucan 200 mg IV first dose, then diflucan 100 mg IV x6 days then ENT will rescope after treatment ()  - FEES scan: pureed diet and thin liquids, continued ST for dysphagia rehab

## 2022-03-31 NOTE — DIETITIAN INITIAL EVALUATION ADULT. - PROBLEM SELECTOR PLAN 3
Multiple episodes of vomiting. Pt reported constipation at rehab and was on lactulose with resolution of constipation. Differential includes C diff given recent Abx use and hospital stay however abdominal exam benign. Possibly in setting of gastroparesis  - Hold C diff testing unless abdominal exam becomes concerning or persistent diarrhea

## 2022-03-31 NOTE — PROGRESS NOTE ADULT - PROBLEM SELECTOR PLAN 6
Last A1c 7.8. Pt uses lantus 15 u qhs and Novolog varying dose depending on sugar, however was unable to elaborate on a usual dose of Novolog. Has been eating pureed diet due to weakness since last hospital stay  - Lantus 10 u qhs  - admelog 3 units  - moderate-dose ISS  - Monitor off prandial insulin until PO intake established

## 2022-03-31 NOTE — SWALLOW FEES ASSESSMENT ADULT - DIAGNOSTIC IMPRESSIONS
72 y/o F admitted with severe SEPSIS and acute hypoxic respiratory failure possibly from PNA and concomitant bronchiectasis exacerbation. ENT following for aphonia, found to have diffuse oropharynx thrush. Pt was seen today for Fiberoptic Endoscopic Evaluation of Swallow (FEES) which revealed mild-moderate dorothea-pharyngeal dysphagia. The oral stage was characterized by mildly prolonged though generally efficient mastication of minced and moist trial, reduced AP bolus transfer with minced and moist and puree, and intermittent uncontrolled spillover with ice chip, puree, and minced and moist trial. The pharyngeal stage was characterized by trace penetration of minced and moist trial without immediate retrieval. Material eventually cleared from laryngeal vestibule with CUED multiple dry swallows. No instances of penetration or aspiration observed during the study with thin liquids or puree. Increase in pharyngeal residue with minced and moist trial vs. puree.     Disorders: reduced lingual strength/ROM/Rate of motion, reduced BOT to posterior pharyngeal wall contact, delay in trigger of the swallow reflex, suspected reduced hyo-laryngeal excursion, reduced laryngeal closure, and reduced supraglottic sensation.

## 2022-03-31 NOTE — DIETITIAN INITIAL EVALUATION ADULT. - REASON INDICATOR FOR ASSESSMENT
Consult received for pressure injury stage 2/>  Information obtained from RN, EMR. pt non verbal, unable to interview at this time

## 2022-03-31 NOTE — SWALLOW FEES ASSESSMENT ADULT - RESIDUE IN LARYNGEAL VESTIBULE/VENTRICAL
Trace residue on the R AE fold and on laryngeal surface of L arytenoid. Eventually clears with multiple CUED swallows.

## 2022-03-31 NOTE — CONSULT NOTE ADULT - ASSESSMENT
71 year old woman with history of tracheobronchomalasia s/p tracheobronchoplasty, bronchiectasis, severe persistent asthma (steroid dependent), adrenal insuffiencey on chronic steroids, DM2, HTN, colorectal cancer s/p total colectomy now with colostomy, PAF on Eliquis presenting with about 3 weeks of productive cough, bilous vomiting, shortness of breath and lethargy. Pt was originally admitted to [facility] on March 8th and treated for pna, given antibiotics, stay c/b intubation 2/2 hypoxia, and discharged on Friday March 25th with oxygen back to rehab (required 3-5L NC, no O2 requirement at baseline). For past 2 days at rehab, pt with worsening SOB, productive cough with white/yellow phlegm, episodes of bilious emesis, increasing lethargy, and febrile (Tmax 105F per daughter). Pt denies headache, vision changes, chills, abdominal pain, palpitations, dysuria, changes in urination, or bloody stools.    In ED: CBC, CMP unremarkable, VBG: pH 7.46, pCO2 45, bicarb 32, lactate 2.1, urine with large leuk esterase conc, uWBC 197 with yeast-like cells, RVP neg, CXR limited but unremarkable. S/p cefepime, vanocmycin, 500 cc bolus, solu-medrol 40 mg IV. (28 Mar 2022 20:20)    3/29 seen by ENT for a hoarse voice-  Laryngoscope at bedside revealed diffuse yellow patches of debris  throughout oropharynx (?thrush) with normal vocal cord function. We are recommending Diflucan x 7 days and ENT will rescope after treatment    Patient's medication currently ordered as methylprenisolone 4mg PO daily -----changed to 8mg PO daily starting 3/31, also gave one time 4mg dose 3/30 PM as catchup dose.  3/31 pt with wound care consult: Skin:  Sacral/bilateral buttocks with central deep maroon discoloration and peripheral superficially denuded skin with skin bridges L 5cm X W 5cm xD 0.1cm with pink wound bed, no necrotic tissue, and scant serosanguinous drainage, +TTP  ID is asked to evaluate for persistent fevers    A/P  # FEVERS  pt with h/o MRSA and pseudomonas as well as achromobacter and kleb  agree with vancomycin and cefepime for now  WOULD increase fungal coverage to diflucan 200m g with h/o thrush- pt s/p broad spectrum abs at [facility]  pt wtih chronic steroids  pt also with yeast in her urine- hopefully not fungemic  may need to send sputum for NTM  check fungitel   check aspergillus abs, galactomannan   71 year old woman with history of tracheobronchomalasia s/p tracheobronchoplasty, bronchiectasis, severe persistent asthma (steroid dependent), adrenal insuffiencey on chronic steroids, DM2, HTN, colorectal cancer s/p total colectomy now with colostomy, PAF on Eliquis presenting with about 3 weeks of productive cough, bilous vomiting, shortness of breath and lethargy. Pt was originally admitted to [facility] on March 8th and treated for pna, given antibiotics, stay c/b intubation 2/2 hypoxia, and discharged on Friday March 25th with oxygen back to rehab (required 3-5L NC, no O2 requirement at baseline). For past 2 days at rehab, pt with worsening SOB, productive cough with white/yellow phlegm, episodes of bilious emesis, increasing lethargy, and febrile (Tmax 105F per daughter). Pt denies headache, vision changes, chills, abdominal pain, palpitations, dysuria, changes in urination, or bloody stools.    In ED: CBC, CMP unremarkable, VBG: pH 7.46, pCO2 45, bicarb 32, lactate 2.1, urine with large leuk esterase conc, uWBC 197 with yeast-like cells, RVP neg, CXR limited but unremarkable. S/p cefepime, vanocmycin, 500 cc bolus, solu-medrol 40 mg IV. (28 Mar 2022 20:20)    3/29 seen by ENT for a hoarse voice-  Laryngoscope at bedside revealed diffuse yellow patches of debris  throughout oropharynx (?thrush) with normal vocal cord function. We are recommending Diflucan x 7 days and ENT will rescope after treatment    Patient's medication currently ordered as methylprenisolone 4mg PO daily -----changed to 8mg PO daily starting 3/31, also gave one time 4mg dose 3/30 PM as catchup dose.  3/31 pt with wound care consult: Skin:  Sacral/bilateral buttocks with central deep maroon discoloration and peripheral superficially denuded skin with skin bridges L 5cm X W 5cm xD 0.1cm with pink wound bed, no necrotic tissue, and scant serosanguinous drainage, +TTP  ID is asked to evaluate for persistent fevers    A/P  # FEVERS  pt with h/o MRSA and pseudomonas as well as achromobacter and kleb  agree with vancomycin and cefepime for now  WOULD increase fungal coverage to diflucan 200m g with h/o thrush- pt s/p broad spectrum abs at [facility]  pt wtih chronic steroids  pt also with yeast in her urine- hopefully not fungemic  may need to send sputum for NTM  check fungitel   check aspergillus abs, galactomannan  appreciate swallow evaluation  check cultures from mediport  repeat SPEP - heme/onc followup    Ashley Liz M.D. ,   please reach via teams   If no answer, or after 5PM/ weekends,  then please call  [phone]    Assessment and plan discussed with the primary team .    ID service will be covering over the next three days . Please call for acute issues or questions. [phone]

## 2022-03-31 NOTE — DIETITIAN INITIAL EVALUATION ADULT. - PROBLEM SELECTOR PLAN 1
Febrile, tachycardia, tachypnea, lactate 2.1. Associated AHRF, productive cough. Recent hospital stay with intubation for pneumonia at OSH and intubated, unclear which antibiotics were given and duration of treatment. Likely in setting of pneumonia and bronchiectasis exacerbation. History of sputum Cx +Pseudomonas (Feb 2020) via bronchoscopy. Also with UA grossly positive, however pt without symptoms  - S/p cefepime and vancomycin in ED, 500 cc bolus  - Cont cefepime for Pseudomonas coverage and vancomycin given recent intubation  - BCx, UCx pending  - Sputum Cx  - MRSA PCR  - RVP negative, repeat pending  - Consider ID consult

## 2022-03-31 NOTE — CONSULT NOTE ADULT - SUBJECTIVE AND OBJECTIVE BOX
Patient is a 71y old  Female who presents with a chief complaint of Pt is 72 y/o F with PMH as per chart: "with history of tracheobronchomalasia s/p tracheobronchoplasty, bronchiectasis, severe persistent asthma (steroid dependent), adrenal insuffiencey on chronic steroids, DM2, HTN, colorectal cancer s/p total colectomy now with colostomy, PAF on Eliquis presenting with about 3 weeks of productive cough, bilous vomiting, shortness of breath and lethargy. Pt was originally admitted to [facility] on March 8th and treated for pna, given antibiotics, stay c/b intubation 2/2 hypoxia, and discharged on Friday March 25th with oxygen back to rehab (required 3-5L NC, no O2 requirement at baseline). For past 2 days at rehab, pt with worsening SOB, productive cough with white/yellow phlegm, episodes of bilious emesis, increasing lethargy, and febrile (Tmax 105F per daughter). Pt denies headache, vision changes, chills, abdominal pain, palpitations, dysuria, changes in urination, or bloody stools" (31 Mar 2022 11:44)      HPI:  71 year old woman with history of tracheobronchomalasia s/p tracheobronchoplasty, bronchiectasis, severe persistent asthma (steroid dependent), adrenal insuffiencey on chronic steroids, DM2, HTN, colorectal cancer s/p total colectomy now with colostomy, PAF on Eliquis presenting with about 3 weeks of productive cough, bilous vomiting, shortness of breath and lethargy. Pt was originally admitted to [facility] on March 8th and treated for pna, given antibiotics, stay c/b intubation 2/2 hypoxia, and discharged on Friday March 25th with oxygen back to rehab (required 3-5L NC, no O2 requirement at baseline). For past 2 days at rehab, pt with worsening SOB, productive cough with white/yellow phlegm, episodes of bilious emesis, increasing lethargy, and febrile (Tmax 105F per daughter). Pt denies headache, vision changes, chills, abdominal pain, palpitations, dysuria, changes in urination, or bloody stools.    In ED: CBC, CMP unremarkable, VBG: pH 7.46, pCO2 45, bicarb 32, lactate 2.1, urine with large leuk esterase conc, uWBC 197 with yeast-like cells, RVP neg, CXR limited but unremarkable. S/p cefepime, vanocmycin, 500 cc bolus, solu-medrol 40 mg IV. (28 Mar 2022 20:20)    3/29 seen by ENT for a hoarse voice-  Laryngoscope at bedside revealed diffuse yellow patches of debris  throughout oropharynx (?thrush) with normal vocal cord function. We are recommending Diflucan x 7 days and ENT will rescope after treatment    Patient's medication currently ordered as methylprenisolone 4mg PO daily -----changed to 8mg PO daily starting 3/31, also gave one time 4mg dose 3/30 PM as catchup dose.  3/31 pt with wound care consult: Skin:  Sacral/bilateral buttocks with central deep maroon discoloration and peripheral superficially denuded skin with skin bridges L 5cm X W 5cm xD 0.1cm with pink wound bed, no necrotic tissue, and scant serosanguinous drainage, +TTP  ID is asked to evaluate for persistent fevers    PAST MEDICAL & SURGICAL HISTORY:  Atrial fibrillation  paroxysmal, on eliquis    Diabetes  Type 2    COPD (chronic obstructive pulmonary disease)    Adrenal insufficiency  Medrol daily for over 50 years    Aortic insufficiency  moderate AR on echo 5/3/2018    Pelvic fracture    Asthma    Tracheobronchomalacia  diagnosed 2015, s/p bronchial thermoplasty 2016 (Dr Zapien); recent bronchoscopy 6/5/2018 revealed no evidence of tracheobronchomalacia in trachea or bronchial tubes    Colorectal cancer  4/2018- last treatment , chemo and radiation    Rectal bleeding    Seizure  x 1 1/7/18    DVT (deep venous thrombosis)  15-20 years ago, took coumadin    TIA (transient ischemic attack)  multiple, last 5 years ago - presents as right-sided weakness    History of partial hysterectomy  30 years ago - fibroids    H/O total knee replacement, bilateral  5 years ago    History of sinus surgery  multiple sinus surgeries    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    H/O pelvic surgery  5 years ago - s/p fracture    History of tracheomalacia  2015 - attempted tracheal stenting ([facility])- course complicated by obstruction, respiratory failure, multiple CPR attempts -  stent discontinued; 10/20/2016 Tracheobronchoplasty (Prolene Mesh) performed at [facility] by Dr Zapien    S/P bronchoscopy  6/5/2018 - [facility] (Dr Zapien) no evidence of tracheobronchomalacia in trachea or bronchial tubes    Rectal bleeding  exam under anesthesia ([facility]) 2/2018        Social history:   Marital Status: single  Occupation: retired, was a   Lives with: sister    Substance Use :denies  Tobacco Usage:  (  x ) never smoked   (   ) former smoker   (   ) current smoker  (     ) pack year  (        ) last tobacco use date  Alcohol Usage: denies  Travel: born in [place]  Pets: cats          FAMILY HISTORY:  Family history of asthma    Family history of breast cancer (Sibling)    Family history of diabetes mellitus type II        REVIEW OF SYSTEMS  General:	fevers    Skin:  No rash  	  Ophthalmologic: Denies any visual complaints ,discharge redness or photophobia  	  ENMT:No nasal discharge, headache, sinus congestion or throat pain. No dental complaints    Respiratory and Thorax:   hoarse voice  cough  phlegm is yellow   	  Cardiovascular:	No chest pain,palpitaions or dizziness    Gastrointestinal:	NO nausea,abdominal pain or diarrhea.    Genitourinary:	No dysuria,frequency. No flank pain    Musculoskeletal:	 diffuse weakness    Neurological:No confusion,diziness.No extremity weakness.    Psychiatric:No delusions or hallucinations	    Hematology/Lymphatics:	No LN swelling.No gum bleeding     Endocrine:	No recent weight gain or loss. No abnormal heat/cold intolerance    Allergic/Immunologic:	No hives or rash     Allergies    aspirin (Short breath)  Avelox (Short breath; Pruritus)  codeine (Short breath)  Dilaudid (Short breath)  iodine (Short breath; Swelling)  shellfish (Anaphylaxis)  tetanus toxoid (Short breath)  Valium (Short breath)    Intolerances        Antimicrobials:       MEDICATIONS  (prior antimicrobials ):  cefepime   IVPB  3/28-3/31    vancomycin  IVPB  3/28- 2/31           cefepime   IVPB 2000 milliGRAM(s) IV Intermittent every 8 hours  fluconAZOLE IVPB 100 milliGRAM(s) IV Intermittent every 24 hours  vancomycin  IVPB 1000 milliGRAM(s) IV Intermittent <User Schedule>      MEDICATIONS  (STANDING):  albuterol/ipratropium for Nebulization 3 milliLiter(s) Nebulizer every 6 hours  apixaban 5 milliGRAM(s) Oral every 12 hours  ascorbic acid 500 milliGRAM(s) Oral daily  atorvastatin 20 milliGRAM(s) Oral at bedtime  cefepime   IVPB 2000 milliGRAM(s) IV Intermittent every 8 hours  chlorhexidine 2% Cloths 1 Application(s) Topical <User Schedule>  dextrose 5%. 1000 milliLiter(s) (50 mL/Hr) IV Continuous <Continuous>  dextrose 5%. 1000 milliLiter(s) (100 mL/Hr) IV Continuous <Continuous>  dextrose 50% Injectable 25 Gram(s) IV Push once  dextrose 50% Injectable 12.5 Gram(s) IV Push once  dextrose 50% Injectable 25 Gram(s) IV Push once  fluconAZOLE IVPB 100 milliGRAM(s) IV Intermittent every 24 hours  glucagon  Injectable 1 milliGRAM(s) IntraMuscular once  influenza  Vaccine (HIGH DOSE) 0.7 milliLiter(s) IntraMuscular once  insulin glargine Injectable (LANTUS) 10 Unit(s) SubCutaneous at bedtime  insulin lispro (ADMELOG) corrective regimen sliding scale   SubCutaneous three times a day before meals  insulin lispro (ADMELOG) corrective regimen sliding scale   SubCutaneous at bedtime  methylPREDNISolone 8 milliGRAM(s) Oral daily  mexiletine 200 milliGRAM(s) Oral three times a day  multivitamin 1 Tablet(s) Oral daily  mupirocin 2% Ointment 1 Application(s) Both Nostrils two times a day  pantoprazole    Tablet 40 milliGRAM(s) Oral before breakfast  polyethylene glycol 3350 17 Gram(s) Oral daily  pregabalin 150 milliGRAM(s) Oral two times a day  senna 2 Tablet(s) Oral at bedtime  sodium chloride 7% Inhalation 4 milliLiter(s) Inhalation every 12 hours  vancomycin  IVPB 1000 milliGRAM(s) IV Intermittent <User Schedule>  zinc sulfate 220 milliGRAM(s) Oral daily    MEDICATIONS  (PRN):  acetaminophen     Tablet .. 975 milliGRAM(s) Oral every 6 hours PRN Temp greater or equal to 38C (100.4F), Mild Pain (1 - 3)  dextrose Oral Gel 15 Gram(s) Oral once PRN Blood Glucose LESS THAN 70 milliGRAM(s)/deciliter  ondansetron Injectable 4 milliGRAM(s) IV Push every 8 hours PRN Nausea and/or Vomiting        Vital Signs Last 24 Hrs  T(C): 37.1 (31 Mar 2022 12:41), Max: 39.4 (30 Mar 2022 16:34)  T(F): 98.7 (31 Mar 2022 12:41), Max: 102.9 (30 Mar 2022 16:34)  HR: 84 (31 Mar 2022 12:41) (84 - 118)  BP: 124/65 (31 Mar 2022 12:41) (102/54 - 136/71)  BP(mean): --  RR: 18 (31 Mar 2022 12:41) (18 - 19)  SpO2: 96% (31 Mar 2022 12:41) (88% - 98%)    PHYSICAL EXAM: Pleasant patient in no acute distress.    Constitutional: Comfortable.Awake and alert   thin weak  voice hoarse     Eyes:PERRL EOMI.NO discharge or conjunctival injection    ENMT:No sinus tenderness.No thrush.No pharyngeal exudate or erythema.Fair dental hygiene    Neck:Supple,No LN,no JVD    Respiratory: scattered wheeze and rhonchi    Cardiovascular:S1 S2 wnl,    Gastrointestinal:Soft BS(+) no tenderness     Extremities:No cyanosis,clubbing or edema.    Vascular:peripheral pulses felt    Neurological:AAO X 3,No grossly focal deficits    Skin:No rash     Lymph Nodes:No palpable LNs    Musculoskeletal:No joint swelling or LOM    Psychiatric:Affect normal.                                10.1   6.91  )-----------( 165      ( 31 Mar 2022 07:38 )             32.8     LIVER FUNCTIONS - ( 31 Mar 2022 07:40 )  Alb: 2.3 g/dL / Pro: 5.5 g/dL / ALK PHOS: 85 U/L / ALT: 64 U/L / AST: 54 U/L / GGT: x             03-31    134<L>  |  96  |  15  ----------------------------<  286<H>  4.4   |  27  |  0.40<L>    Ca    8.0<L>      31 Mar 2022 07:40  Phos  2.2     03-31  Mg     1.6     03-31    TPro  5.5<L>  /  Alb  2.3<L>  /  TBili  0.3  /  DBili  x   /  AST  54<H>  /  ALT  64<H>  /  AlkPhos  85  03-31      Vancomycin Level, Trough: 10.3 ug/mL (03-31 @ 07:37)  Vancomycin Level, Trough: 11.1 ug/mL (03-30 @ 17:56)      Culture - Sputum . (03.29.22 @ 06:25)    Gram Stain:   Few Squamous epithelial cells per low power field  Few polymorphonuclear leukocytes per low power field  Numerous Gram positive cocci in pairs, chains and clusters per oil power  field  Numerous Gram Negative Rods per oil power field  Few Gram Variable Rods per oil power field    -  Ampicillin/Sulbactam: R <=8/4    -  Cefazolin: R 16    -  Clindamycin: R >4    -  Erythromycin: R >4    -  Gentamicin: S <=1 Should not be used as monotherapy    -  Linezolid: S 2    -  Oxacillin: R >2    -  Penicillin: R >8    -  Rifampin: S <=1 Should not be used as monotherapy    -  Tetra/Doxy: R >8    -  Trimethoprim/Sulfamethoxazole: S <=0.5/9.5    -  Vancomycin: S 1    Specimen Source: .Sputum Sputum    Culture Results:   Numerous Methicillin Resistant Staphylococcus aureus  Normal Respiratory Jessica absent    Organism Identification: Methicillin resistant Staphylococcus aureus    Organism: Methicillin resistant Staphylococcus aureus    Method Type: GARRETT    MRSA/MSSA PCR (03.29.22 @ 01:57)    MRSA PCR Result.: Detected: MRSA/MSSA PCR assay is a qualitative in vitro diagnostic test for the  direct detection and differentiation of methicillin-resistant  Staphylococcus aureus (MRSA) from Staphylococcus aureus (SA). The assay  detects DNA from nasal swabs in patients atrisk for nasal colonization.  It is not intended to diagnose MRSA or SA infections nor guide or monitor  treatment for MRSA/SA infections. A negative result does not preclude  nasal colonization. The Real-Time PCR assay is FDA-approved, and its  performance has been established by [place], [place].    Staph Aureus PCR Result: Detected    Culture - Blood (03.28.22 @ 22:03)    Specimen Source: .Blood Blood-Peripheral    Culture Results:   No growth to date.    Culture - Blood (03.28.22 @ 22:03)    Specimen Source: .Blood Blood-Peripheral    Culture Results:   No growth to date.    Respiratory Viral Panel with COVID-19 by LUIS ALBERTO (03.28.22 @ 17:23)    Rapid RVP Result: NotDetec    SARS-CoV-2: NotDete: This Respiratory Panel uses polymerase chain reaction (PCR) to detect for  adenovirus; coronavirus (HKU1, NL63, 229E, OC43); human metapneumovirus  (hMPV); human enterovirus/rhinovirus (Entero/RV); influenza A; influenza  A/H1; influenza A/H3; influenza A/H1-2009; influenza B; parainfluenza  viruses 1, 2, 3, 4; respiratory syncytial virus; Mycoplasma pneumoniae;  Chlamydophila pneumoniae; and SARS-CoV-2.    < from: CT Chest No Cont (03.28.22 @ 21:49) >  ACC: [id number] EXAM:  CT CHEST                          PROCEDURE DATE:  03/28/2022          INTERPRETATION:  INDICATION: Sepsis, AHRF, bronchiectasis. History of   anal cancer relapse s/p surgical resection 2018.    TECHNIQUE: A volumetric CT acquisition of the chest was obtained from the   thoracic inlet to the upper abdomen without the use of intravenous   contrast. Coronal and sagittal reconstructed images are provided.    COMPARISON: Chest CT 2/9/2022    FINDINGS:    Lungs/Airways/Pleura: The central airways are patent. No pleural   effusion. Since 2/9/2022 chest CT, overall worsening diffuse bronchial   mucoid impaction, groundglass and tree-in-bud nodularity involving the   right greater than left lung on a background of bronchiectasis and   bronchial wall thickening, with new focal consolidation at the posterior   basal right lower lobe.    Mediastinum/Lymph nodes: No thoracic adenopathy.    Heart and Vessels: Right chest wall port with tip terminating in the   distal SVC. Mid ascending aorta measures 4 cm. The pulmonary artery   measures 3.3 cm. The heart is normal in size. Mild coronary arterial   calcification is present. Aortic valvular and mitral annular   calcification also noted. No pericardial effusion.    Upper Abdomen: Stable cystic nodules arising from the pancreatic tail   measuring up to 2.1 cm.    Osseous structures and Soft Tissues: Stable numerous scattered sclerotic   lesions throughout the thoracolumbar spine since 2016, likely bone   islands.    IMPRESSION:  Compared to 2/9/2022 chest CT, worsening extensive bronchial impaction   and tree-in-bud nodularity with new right lower lobe consolidation on a   background of bronchiectasis.    --- End of Report ---            REFUGIO LEACH M.D., AttendingRadiologist  This document has been electronically signed. Mar 29 2022  8:49AM    < end of copied text >      < from: Xray Abdomen 1 View PORTABLE -Urgent (Xray Abdomen 1 View PORTABLE -Urgent .) (03.30.22 @ 10:34) >    ACC: [id number] EXAM:  XR ABDOMEN PORTABLE URGENT 1V                          PROCEDURE DATE:  03/30/2022          INTERPRETATION:  CLINICAL INFORMATION: abdominal pain.    EXAM: 2 view abdominopelvic radiograph.    COMPARISON: Abdominal radiograph 08/27/2018    FINDINGS:  LLQ colostomy noted.  Moderate to severe colonic fecal load noted.  Nonobstructive bowel gas pattern.  No free air.  Degenerative changes of the visualized spine and bilateral hips.  S/P orthopedic fixation of the pubis and sacrum.    IMPRESSION:  Moderate to severe colonic fecal load.  Nonobstructive bowel gas pattern.    --- End of Report ---          TEN ALBRIGHT MD; Resident Radiologist  This document has been electronically signed.  NATHANIEL JENSEN MD; Attending Radiologist  This document has been electronically signed. Mar 30 2022 12:35PM    < end of copied text >               Patient is a 71y old  Female who presents with a chief complaint of Pt is 70 y/o F with PMH as per chart: "with history of tracheobronchomalasia s/p tracheobronchoplasty, bronchiectasis, severe persistent asthma (steroid dependent), adrenal insuffiencey on chronic steroids, DM2, HTN, colorectal cancer s/p total colectomy now with colostomy, PAF on Eliquis presenting with about 3 weeks of productive cough, bilous vomiting, shortness of breath and lethargy. Pt was originally admitted to [facility] on March 8th and treated for pna, given antibiotics, stay c/b intubation 2/2 hypoxia, and discharged on Friday March 25th with oxygen back to rehab (required 3-5L NC, no O2 requirement at baseline). For past 2 days at rehab, pt with worsening SOB, productive cough with white/yellow phlegm, episodes of bilious emesis, increasing lethargy, and febrile (Tmax 105F per daughter). Pt denies headache, vision changes, chills, abdominal pain, palpitations, dysuria, changes in urination, or bloody stools" (31 Mar 2022 11:44)      HPI:  71 year old woman with history of tracheobronchomalasia s/p tracheobronchoplasty, bronchiectasis, severe persistent asthma (steroid dependent), adrenal insuffiencey on chronic steroids, DM2, HTN, colorectal cancer s/p total colectomy now with colostomy, PAF on Eliquis presenting with about 3 weeks of productive cough, bilous vomiting, shortness of breath and lethargy. Pt was originally admitted to [facility] on March 8th and treated for pna, given antibiotics, stay c/b intubation 2/2 hypoxia, and discharged on Friday March 25th with oxygen back to rehab (required 3-5L NC, no O2 requirement at baseline). For past 2 days at rehab, pt with worsening SOB, productive cough with white/yellow phlegm, episodes of bilious emesis, increasing lethargy, and febrile (Tmax 105F per daughter). Pt denies headache, vision changes, chills, abdominal pain, palpitations, dysuria, changes in urination, or bloody stools.    In ED: CBC, CMP unremarkable, VBG: pH 7.46, pCO2 45, bicarb 32, lactate 2.1, urine with large leuk esterase conc, uWBC 197 with yeast-like cells, RVP neg, CXR limited but unremarkable. S/p cefepime, vanocmycin, 500 cc bolus, solu-medrol 40 mg IV. (28 Mar 2022 20:20)    3/29 seen by ENT for a hoarse voice-  Laryngoscope at bedside revealed diffuse yellow patches of debris  throughout oropharynx (?thrush) with normal vocal cord function. We are recommending Diflucan x 7 days and ENT will rescope after treatment    Patient's medication currently ordered as methylprenisolone 4mg PO daily -----changed to 8mg PO daily starting 3/31, also gave one time 4mg dose 3/30 PM as catchup dose.  3/31 pt with wound care consult: Skin:  Sacral/bilateral buttocks with central deep maroon discoloration and peripheral superficially denuded skin with skin bridges L 5cm X W 5cm xD 0.1cm with pink wound bed, no necrotic tissue, and scant serosanguinous drainage, +TTP  ID is asked to evaluate for persistent fevers    PAST MEDICAL & SURGICAL HISTORY:  Atrial fibrillation  paroxysmal, on eliquis    Diabetes  Type 2    COPD (chronic obstructive pulmonary disease)    Adrenal insufficiency  Medrol daily for over 50 years    Aortic insufficiency  moderate AR on echo 5/3/2018    Pelvic fracture    Asthma    Tracheobronchomalacia  diagnosed 2015, s/p bronchial thermoplasty 2016 (Dr Zapien); recent bronchoscopy 6/5/2018 revealed no evidence of tracheobronchomalacia in trachea or bronchial tubes    Colorectal cancer  4/2018- last treatment , chemo and radiation    Rectal bleeding    Seizure  x 1 1/7/18    DVT (deep venous thrombosis)  15-20 years ago, took coumadin    TIA (transient ischemic attack)  multiple, last 5 years ago - presents as right-sided weakness    History of partial hysterectomy  30 years ago - fibroids    H/O total knee replacement, bilateral  5 years ago    History of sinus surgery  multiple sinus surgeries    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    H/O pelvic surgery  5 years ago - s/p fracture    History of tracheomalacia  2015 - attempted tracheal stenting ([facility])- course complicated by obstruction, respiratory failure, multiple CPR attempts -  stent discontinued; 10/20/2016 Tracheobronchoplasty (Prolene Mesh) performed at [facility] by Dr Zapien    S/P bronchoscopy  6/5/2018 - [place] [facility] (Dr Zapien) no evidence of tracheobronchomalacia in trachea or bronchial tubes    Rectal bleeding  exam under anesthesia ([facility]) 2/2018        Social history:   Marital Status: single  Occupation: retired, was a   Lives with: sister    Substance Use :denies  Tobacco Usage:  (  x ) never smoked   (   ) former smoker   (   ) current smoker  (     ) pack year  (        ) last tobacco use date  Alcohol Usage: denies  Travel: born in [place]  Pets: cats          FAMILY HISTORY:  Family history of asthma    Family history of breast cancer (Sibling)    Family history of diabetes mellitus type II        REVIEW OF SYSTEMS  General:	fevers    Skin:  No rash  	  Ophthalmologic: Denies any visual complaints ,discharge redness or photophobia  	  ENMT:No nasal discharge, headache, sinus congestion or throat pain. No dental complaints    Respiratory and Thorax:   hoarse voice  cough  phlegm is yellow   	  Cardiovascular:	No chest pain,palpitaions or dizziness    Gastrointestinal:	NO nausea,abdominal pain or diarrhea.    Genitourinary:	No dysuria,frequency. No flank pain    Musculoskeletal:	 diffuse weakness    Neurological:No confusion,diziness.No extremity weakness.    Psychiatric:No delusions or hallucinations	    Hematology/Lymphatics:	No LN swelling.No gum bleeding     Endocrine:	No recent weight gain or loss. No abnormal heat/cold intolerance    Allergic/Immunologic:	No hives or rash     Allergies    aspirin (Short breath)  Avelox (Short breath; Pruritus)  codeine (Short breath)  Dilaudid (Short breath)  iodine (Short breath; Swelling)  shellfish (Anaphylaxis)  tetanus toxoid (Short breath)  Valium (Short breath)    Intolerances        Antimicrobials:       MEDICATIONS  (prior antimicrobials ):  cefepime   IVPB  3/28-3/31    vancomycin  IVPB  3/28- 2/31           cefepime   IVPB 2000 milliGRAM(s) IV Intermittent every 8 hours  fluconAZOLE IVPB 100 milliGRAM(s) IV Intermittent every 24 hours  vancomycin  IVPB 1000 milliGRAM(s) IV Intermittent <User Schedule>      MEDICATIONS  (STANDING):  albuterol/ipratropium for Nebulization 3 milliLiter(s) Nebulizer every 6 hours  apixaban 5 milliGRAM(s) Oral every 12 hours  ascorbic acid 500 milliGRAM(s) Oral daily  atorvastatin 20 milliGRAM(s) Oral at bedtime  cefepime   IVPB 2000 milliGRAM(s) IV Intermittent every 8 hours  chlorhexidine 2% Cloths 1 Application(s) Topical <User Schedule>  dextrose 5%. 1000 milliLiter(s) (50 mL/Hr) IV Continuous <Continuous>  dextrose 5%. 1000 milliLiter(s) (100 mL/Hr) IV Continuous <Continuous>  dextrose 50% Injectable 25 Gram(s) IV Push once  dextrose 50% Injectable 12.5 Gram(s) IV Push once  dextrose 50% Injectable 25 Gram(s) IV Push once  fluconAZOLE IVPB 100 milliGRAM(s) IV Intermittent every 24 hours  glucagon  Injectable 1 milliGRAM(s) IntraMuscular once  influenza  Vaccine (HIGH DOSE) 0.7 milliLiter(s) IntraMuscular once  insulin glargine Injectable (LANTUS) 10 Unit(s) SubCutaneous at bedtime  insulin lispro (ADMELOG) corrective regimen sliding scale   SubCutaneous three times a day before meals  insulin lispro (ADMELOG) corrective regimen sliding scale   SubCutaneous at bedtime  methylPREDNISolone 8 milliGRAM(s) Oral daily  mexiletine 200 milliGRAM(s) Oral three times a day  multivitamin 1 Tablet(s) Oral daily  mupirocin 2% Ointment 1 Application(s) Both Nostrils two times a day  pantoprazole    Tablet 40 milliGRAM(s) Oral before breakfast  polyethylene glycol 3350 17 Gram(s) Oral daily  pregabalin 150 milliGRAM(s) Oral two times a day  senna 2 Tablet(s) Oral at bedtime  sodium chloride 7% Inhalation 4 milliLiter(s) Inhalation every 12 hours  vancomycin  IVPB 1000 milliGRAM(s) IV Intermittent <User Schedule>  zinc sulfate 220 milliGRAM(s) Oral daily    MEDICATIONS  (PRN):  acetaminophen     Tablet .. 975 milliGRAM(s) Oral every 6 hours PRN Temp greater or equal to 38C (100.4F), Mild Pain (1 - 3)  dextrose Oral Gel 15 Gram(s) Oral once PRN Blood Glucose LESS THAN 70 milliGRAM(s)/deciliter  ondansetron Injectable 4 milliGRAM(s) IV Push every 8 hours PRN Nausea and/or Vomiting        Vital Signs Last 24 Hrs  T(C): 37.1 (31 Mar 2022 12:41), Max: 39.4 (30 Mar 2022 16:34)  T(F): 98.7 (31 Mar 2022 12:41), Max: 102.9 (30 Mar 2022 16:34)  HR: 84 (31 Mar 2022 12:41) (84 - 118)  BP: 124/65 (31 Mar 2022 12:41) (102/54 - 136/71)  BP(mean): --  RR: 18 (31 Mar 2022 12:41) (18 - 19)  SpO2: 96% (31 Mar 2022 12:41) (88% - 98%)    PHYSICAL EXAM: Pleasant patient in no acute distress.    Constitutional: Comfortable.Awake and alert   thin weak  voice hoarse     Eyes:PERRL EOMI.NO discharge or conjunctival injection    ENMT:No sinus tenderness.No thrush.No pharyngeal exudate or erythema.Fair dental hygiene    Neck:Supple,No LN,no JVD    Respiratory: scattered wheeze and rhonchi    Cardiovascular:S1 S2 wnl,    Gastrointestinal:Soft BS(+) no tenderness colostomy    Extremities:No cyanosis,clubbing or edema.    Vascular:peripheral pulses felt    Neurological:AAO X 3,No grossly focal deficits    Skin:No rash     Lymph Nodes:No palpable LNs    Musculoskeletal:No joint swelling or LOM    Psychiatric:Affect normal.                                10.1   6.91  )-----------( 165      ( 31 Mar 2022 07:38 )             32.8     LIVER FUNCTIONS - ( 31 Mar 2022 07:40 )  Alb: 2.3 g/dL / Pro: 5.5 g/dL / ALK PHOS: 85 U/L / ALT: 64 U/L / AST: 54 U/L / GGT: x             03-31    134<L>  |  96  |  15  ----------------------------<  286<H>  4.4   |  27  |  0.40<L>    Ca    8.0<L>      31 Mar 2022 07:40  Phos  2.2     03-31  Mg     1.6     03-31    TPro  5.5<L>  /  Alb  2.3<L>  /  TBili  0.3  /  DBili  x   /  AST  54<H>  /  ALT  64<H>  /  AlkPhos  85  03-31      Vancomycin Level, Trough: 10.3 ug/mL (03-31 @ 07:37)  Vancomycin Level, Trough: 11.1 ug/mL (03-30 @ 17:56)      Culture - Sputum . (03.29.22 @ 06:25)    Gram Stain:   Few Squamous epithelial cells per low power field  Few polymorphonuclear leukocytes per low power field  Numerous Gram positive cocci in pairs, chains and clusters per oil power  field  Numerous Gram Negative Rods per oil power field  Few Gram Variable Rods per oil power field    -  Ampicillin/Sulbactam: R <=8/4    -  Cefazolin: R 16    -  Clindamycin: R >4    -  Erythromycin: R >4    -  Gentamicin: S <=1 Should not be used as monotherapy    -  Linezolid: S 2    -  Oxacillin: R >2    -  Penicillin: R >8    -  Rifampin: S <=1 Should not be used as monotherapy    -  Tetra/Doxy: R >8    -  Trimethoprim/Sulfamethoxazole: S <=0.5/9.5    -  Vancomycin: S 1    Specimen Source: .Sputum Sputum    Culture Results:   Numerous Methicillin Resistant Staphylococcus aureus  Normal Respiratory Jessica absent    Organism Identification: Methicillin resistant Staphylococcus aureus    Organism: Methicillin resistant Staphylococcus aureus    Method Type: GARRETT    MRSA/MSSA PCR (03.29.22 @ 01:57)    MRSA PCR Result.: Detected: MRSA/MSSA PCR assay is a qualitative in vitro diagnostic test for the  direct detection and differentiation of methicillin-resistant  Staphylococcus aureus (MRSA) from Staphylococcus aureus (SA). The assay  detects DNA from nasal swabs in patients atrisk for nasal colonization.  It is not intended to diagnose MRSA or SA infections nor guide or monitor  treatment for MRSA/SA infections. A negative result does not preclude  nasal colonization. The Real-Time PCR assay is FDA-approved, and its  performance has been established by [facility], [place], [place].    Staph Aureus PCR Result: Detected    Culture - Blood (03.28.22 @ 22:03)    Specimen Source: .Blood Blood-Peripheral    Culture Results:   No growth to date.    Culture - Blood (03.28.22 @ 22:03)    Specimen Source: .Blood Blood-Peripheral    Culture Results:   No growth to date.    Respiratory Viral Panel with COVID-19 by LUIS ALBERTO (03.28.22 @ 17:23)    Rapid RVP Result: NotDetec    SARS-CoV-2: NotDete: This Respiratory Panel uses polymerase chain reaction (PCR) to detect for  adenovirus; coronavirus (HKU1, NL63, 229E, OC43); human metapneumovirus  (hMPV); human enterovirus/rhinovirus (Entero/RV); influenza A; influenza  A/H1; influenza A/H3; influenza A/H1-2009; influenza B; parainfluenza  viruses 1, 2, 3, 4; respiratory syncytial virus; Mycoplasma pneumoniae;  Chlamydophila pneumoniae; and SARS-CoV-2.    < from: CT Chest No Cont (03.28.22 @ 21:49) >  ACC: [id number] EXAM:  CT CHEST                          PROCEDURE DATE:  03/28/2022          INTERPRETATION:  INDICATION: Sepsis, AHRF, bronchiectasis. History of   anal cancer relapse s/p surgical resection 2018.    TECHNIQUE: A volumetric CT acquisition of the chest was obtained from the   thoracic inlet to the upper abdomen without the use of intravenous   contrast. Coronal and sagittal reconstructed images are provided.    COMPARISON: Chest CT 2/9/2022    FINDINGS:    Lungs/Airways/Pleura: The central airways are patent. No pleural   effusion. Since 2/9/2022 chest CT, overall worsening diffuse bronchial   mucoid impaction, groundglass and tree-in-bud nodularity involving the   right greater than left lung on a background of bronchiectasis and   bronchial wall thickening, with new focal consolidation at the posterior   basal right lower lobe.    Mediastinum/Lymph nodes: No thoracic adenopathy.    Heart and Vessels: Right chest wall port with tip terminating in the   distal SVC. Mid ascending aorta measures 4 cm. The pulmonary artery   measures 3.3 cm. The heart is normal in size. Mild coronary arterial   calcification is present. Aortic valvular and mitral annular   calcification also noted. No pericardial effusion.    Upper Abdomen: Stable cystic nodules arising from the pancreatic tail   measuring up to 2.1 cm.    Osseous structures and Soft Tissues: Stable numerous scattered sclerotic   lesions throughout the thoracolumbar spine since 2016, likely bone   islands.    IMPRESSION:  Compared to 2/9/2022 chest CT, worsening extensive bronchial impaction   and tree-in-bud nodularity with new right lower lobe consolidation on a   background of bronchiectasis.    --- End of Report ---            REFUGIO LEACH M.D., AttendingRadiologist  This document has been electronically signed. Mar 29 2022  8:49AM    < end of copied text >      < from: Xray Abdomen 1 View PORTABLE -Urgent (Xray Abdomen 1 View PORTABLE -Urgent .) (03.30.22 @ 10:34) >    ACC: [id number] EXAM:  XR ABDOMEN PORTABLE URGENT 1V                          PROCEDURE DATE:  03/30/2022          INTERPRETATION:  CLINICAL INFORMATION: abdominal pain.    EXAM: 2 view abdominopelvic radiograph.    COMPARISON: Abdominal radiograph 08/27/2018    FINDINGS:  LLQ colostomy noted.  Moderate to severe colonic fecal load noted.  Nonobstructive bowel gas pattern.  No free air.  Degenerative changes of the visualized spine and bilateral hips.  S/P orthopedic fixation of the pubis and sacrum.    IMPRESSION:  Moderate to severe colonic fecal load.  Nonobstructive bowel gas pattern.    --- End of Report ---          TEN ALBRIGHT MD; Resident Radiologist  This document has been electronically signed.  NATHANIEL JENSEN MD; Attending Radiologist  This document has been electronically signed. Mar 30 2022 12:35PM    < end of copied text >               HPI:  71 year old woman with history of tracheobronchomalasia s/p tracheobronchoplasty, bronchiectasis, severe persistent asthma (steroid dependent), adrenal insuffiencey on chronic steroids, DM2, HTN, colorectal cancer s/p total colectomy now with colostomy, PAF on Eliquis presenting with about 3 weeks of productive cough, bilous vomiting, shortness of breath and lethargy. Pt was originally admitted to [facility] on March 8th and treated for pna, given antibiotics, stay c/b intubation 2/2 hypoxia, and discharged on Friday March 25th with oxygen back to rehab (required 3-5L NC, no O2 requirement at baseline). For past 2 days at rehab, pt with worsening SOB, productive cough with white/yellow phlegm, episodes of bilious emesis, increasing lethargy, and febrile (Tmax 105F per daughter). Pt denies headache, vision changes, chills, abdominal pain, palpitations, dysuria, changes in urination, or bloody stools.    In ED: CBC, CMP unremarkable, VBG: pH 7.46, pCO2 45, bicarb 32, lactate 2.1, urine with large leuk esterase conc, uWBC 197 with yeast-like cells, RVP neg, CXR limited but unremarkable. S/p cefepime, vanocmycin, 500 cc bolus, solu-medrol 40 mg IV. (28 Mar 2022 20:20)    3/29 seen by ENT for a hoarse voice-  Laryngoscope at bedside revealed diffuse yellow patches of debris  throughout oropharynx (?thrush) with normal vocal cord function. We are recommending Diflucan x 7 days and ENT will rescope after treatment    Patient's medication currently ordered as methylprenisolone 4mg PO daily -----changed to 8mg PO daily starting 3/31, also gave one time 4mg dose 3/30 PM as catchup dose.  3/31 pt with wound care consult: Skin:  Sacral/bilateral buttocks with central deep maroon discoloration and peripheral superficially denuded skin with skin bridges L 5cm X W 5cm xD 0.1cm with pink wound bed, no necrotic tissue, and scant serosanguinous drainage, +TTP  ID is asked to evaluate for persistent fevers    PAST MEDICAL & SURGICAL HISTORY:  Atrial fibrillation  paroxysmal, on eliquis    Diabetes  Type 2    COPD (chronic obstructive pulmonary disease)    Adrenal insufficiency  Medrol daily for over 50 years    Aortic insufficiency  moderate AR on echo 5/3/2018    Pelvic fracture    Asthma    Tracheobronchomalacia  diagnosed 2015, s/p bronchial thermoplasty 2016 (Dr Zapien); recent bronchoscopy 6/5/2018 revealed no evidence of tracheobronchomalacia in trachea or bronchial tubes    Colorectal cancer  4/2018- last treatment , chemo and radiation    Rectal bleeding    Seizure  x 1 1/7/18    DVT (deep venous thrombosis)  15-20 years ago, took coumadin    TIA (transient ischemic attack)  multiple, last 5 years ago - presents as right-sided weakness    History of partial hysterectomy  30 years ago - fibroids    H/O total knee replacement, bilateral  5 years ago    History of sinus surgery  multiple sinus surgeries    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    H/O pelvic surgery  5 years ago - s/p fracture    History of tracheomalacia  2015 - attempted tracheal stenting ([facility])- course complicated by obstruction, respiratory failure, multiple CPR attempts -  stent discontinued; 10/20/2016 Tracheobronchoplasty (Prolene Mesh) performed at [facility] by Dr Zapien    S/P bronchoscopy  6/5/2018 - [facility] (Dr Zapien) no evidence of tracheobronchomalacia in trachea or bronchial tubes    Rectal bleeding  exam under anesthesia ([facility]) 2/2018        Social history:   Marital Status: single  Occupation: retired, was a   Lives with: sister    Substance Use :denies  Tobacco Usage:  (  x ) never smoked   (   ) former smoker   (   ) current smoker  (     ) pack year  (        ) last tobacco use date  Alcohol Usage: denies  Travel: born in [place]  Pets: cats      FAMILY HISTORY:  Family history of asthma  Family history of breast cancer (Sibling)  Family history of diabetes mellitus type II    REVIEW OF SYSTEMS  General:	fevers    Skin:  No rash  	  Ophthalmologic: Denies any visual complaints ,discharge redness or photophobia  	  ENMT:No nasal discharge, headache, sinus congestion or throat pain. No dental complaints    Respiratory and Thorax:   hoarse voice  cough  phlegm is yellow   	  Cardiovascular:	No chest pain,palpitaions or dizziness    Gastrointestinal:	NO nausea,abdominal pain or diarrhea.    Genitourinary:	No dysuria,frequency. No flank pain    Musculoskeletal:	 diffuse weakness    Neurological:No confusion,diziness.  weakness.    Psychiatric:No delusions or hallucinations	    Hematology/Lymphatics:	No LN swelling.No gum bleeding     Endocrine:	No recent weight gain or loss. No abnormal heat/cold intolerance    Allergic/Immunologic:	No hives or rash     Allergies    aspirin (Short breath)  Avelox (Short breath; Pruritus)  codeine (Short breath)  Dilaudid (Short breath)  iodine (Short breath; Swelling)  shellfish (Anaphylaxis)  tetanus toxoid (Short breath)  Valium (Short breath)    Intolerances        Antimicrobials:       MEDICATIONS  (prior antimicrobials ):  cefepime   IVPB  3/28-3/31    vancomycin  IVPB  3/28- 2/31      cefepime   IVPB 2000 milliGRAM(s) IV Intermittent every 8 hours  fluconAZOLE IVPB 100 milliGRAM(s) IV Intermittent every 24 hours  vancomycin  IVPB 1000 milliGRAM(s) IV Intermittent <User Schedule>      MEDICATIONS  (STANDING):  albuterol/ipratropium for Nebulization 3 milliLiter(s) Nebulizer every 6 hours  apixaban 5 milliGRAM(s) Oral every 12 hours  ascorbic acid 500 milliGRAM(s) Oral daily  atorvastatin 20 milliGRAM(s) Oral at bedtime  cefepime   IVPB 2000 milliGRAM(s) IV Intermittent every 8 hours  chlorhexidine 2% Cloths 1 Application(s) Topical <User Schedule>  dextrose 5%. 1000 milliLiter(s) (50 mL/Hr) IV Continuous <Continuous>  dextrose 5%. 1000 milliLiter(s) (100 mL/Hr) IV Continuous <Continuous>  dextrose 50% Injectable 25 Gram(s) IV Push once  dextrose 50% Injectable 12.5 Gram(s) IV Push once  dextrose 50% Injectable 25 Gram(s) IV Push once  fluconAZOLE IVPB 100 milliGRAM(s) IV Intermittent every 24 hours  glucagon  Injectable 1 milliGRAM(s) IntraMuscular once  influenza  Vaccine (HIGH DOSE) 0.7 milliLiter(s) IntraMuscular once  insulin glargine Injectable (LANTUS) 10 Unit(s) SubCutaneous at bedtime  insulin lispro (ADMELOG) corrective regimen sliding scale   SubCutaneous three times a day before meals  insulin lispro (ADMELOG) corrective regimen sliding scale   SubCutaneous at bedtime  methylPREDNISolone 8 milliGRAM(s) Oral daily  mexiletine 200 milliGRAM(s) Oral three times a day  multivitamin 1 Tablet(s) Oral daily  mupirocin 2% Ointment 1 Application(s) Both Nostrils two times a day  pantoprazole    Tablet 40 milliGRAM(s) Oral before breakfast  polyethylene glycol 3350 17 Gram(s) Oral daily  pregabalin 150 milliGRAM(s) Oral two times a day  senna 2 Tablet(s) Oral at bedtime  sodium chloride 7% Inhalation 4 milliLiter(s) Inhalation every 12 hours  vancomycin  IVPB 1000 milliGRAM(s) IV Intermittent <User Schedule>  zinc sulfate 220 milliGRAM(s) Oral daily    MEDICATIONS  (PRN):  acetaminophen     Tablet .. 975 milliGRAM(s) Oral every 6 hours PRN Temp greater or equal to 38C (100.4F), Mild Pain (1 - 3)  dextrose Oral Gel 15 Gram(s) Oral once PRN Blood Glucose LESS THAN 70 milliGRAM(s)/deciliter  ondansetron Injectable 4 milliGRAM(s) IV Push every 8 hours PRN Nausea and/or Vomiting        Vital Signs Last 24 Hrs  T(C): 37.1 (31 Mar 2022 12:41), Max: 39.4 (30 Mar 2022 16:34)  T(F): 98.7 (31 Mar 2022 12:41), Max: 102.9 (30 Mar 2022 16:34)  HR: 84 (31 Mar 2022 12:41) (84 - 118)  BP: 124/65 (31 Mar 2022 12:41) (102/54 - 136/71)  BP(mean): --  RR: 18 (31 Mar 2022 12:41) (18 - 19)  SpO2: 96% (31 Mar 2022 12:41) (88% - 98%)    PHYSICAL EXAM: Pleasant patient in no acute distress.    Constitutional: Comfortable.Awake and alert   thin weak  voice hoarse     Eyes:PERRL EOMI.NO discharge or conjunctival injection    ENMT:No sinus tenderness.No thrush.No pharyngeal exudate or erythema.Fair dental hygiene    Neck:Supple,No LN,no JVD    Respiratory: scattered wheeze and rhonchi    Cardiovascular:S1 S2 wnl,    Gastrointestinal:Soft BS(+) no tenderness colostomy    Extremities:No cyanosis,clubbing or edema.    Vascular:peripheral pulses felt    Neurological:AAO X 3,No grossly focal deficits    Skin:No rash     Lymph Nodes:No palpable LNs    Musculoskeletal:No joint swelling or LOM    Psychiatric:Affect normal.                                10.1   6.91  )-----------( 165      ( 31 Mar 2022 07:38 )             32.8     LIVER FUNCTIONS - ( 31 Mar 2022 07:40 )  Alb: 2.3 g/dL / Pro: 5.5 g/dL / ALK PHOS: 85 U/L / ALT: 64 U/L / AST: 54 U/L / GGT: x             03-31    134<L>  |  96  |  15  ----------------------------<  286<H>  4.4   |  27  |  0.40<L>    Ca    8.0<L>      31 Mar 2022 07:40  Phos  2.2     03-31  Mg     1.6     03-31    TPro  5.5<L>  /  Alb  2.3<L>  /  TBili  0.3  /  DBili  x   /  AST  54<H>  /  ALT  64<H>  /  AlkPhos  85  03-31      Vancomycin Level, Trough: 10.3 ug/mL (03-31 @ 07:37)  Vancomycin Level, Trough: 11.1 ug/mL (03-30 @ 17:56)      Culture - Sputum . (03.29.22 @ 06:25)    Gram Stain:   Few Squamous epithelial cells per low power field  Few polymorphonuclear leukocytes per low power field  Numerous Gram positive cocci in pairs, chains and clusters per oil power  field  Numerous Gram Negative Rods per oil power field  Few Gram Variable Rods per oil power field    -  Ampicillin/Sulbactam: R <=8/4    -  Cefazolin: R 16    -  Clindamycin: R >4    -  Erythromycin: R >4    -  Gentamicin: S <=1 Should not be used as monotherapy    -  Linezolid: S 2    -  Oxacillin: R >2    -  Penicillin: R >8    -  Rifampin: S <=1 Should not be used as monotherapy    -  Tetra/Doxy: R >8    -  Trimethoprim/Sulfamethoxazole: S <=0.5/9.5    -  Vancomycin: S 1    Specimen Source: .Sputum Sputum    Culture Results:   Numerous Methicillin Resistant Staphylococcus aureus  Normal Respiratory Jessica absent    Organism Identification: Methicillin resistant Staphylococcus aureus    Organism: Methicillin resistant Staphylococcus aureus    Method Type: GARRETT    MRSA/MSSA PCR (03.29.22 @ 01:57)    MRSA PCR Result.: Detected: MRSA/MSSA PCR assay is a qualitative in vitro diagnostic test for the    Staph Aureus PCR Result: Detected    Culture - Blood (03.28.22 @ 22:03)    Specimen Source: .Blood Blood-Peripheral    Culture Results:   No growth to date.    Culture - Blood (03.28.22 @ 22:03)    Specimen Source: .Blood Blood-Peripheral    Culture Results:   No growth to date.    Respiratory Viral Panel with COVID-19 by LUIS ALBERTO (03.28.22 @ 17:23)    Rapid RVP Result: [facility]    SARS-CoV-2: NotDetec: T      < from: CT Chest No Cont (03.28.22 @ 21:49) >  ACC: [id number] EXAM:  CT CHEST                          PROCEDURE DATE:  03/28/2022          INTERPRETATION:  INDICATION: Sepsis, AHRF, bronchiectasis. History of   anal cancer relapse s/p surgical resection 2018.    TECHNIQUE: A volumetric CT acquisition of the chest was obtained from the   thoracic inlet to the upper abdomen without the use of intravenous   contrast. Coronal and sagittal reconstructed images are provided.    COMPARISON: Chest CT 2/9/2022    FINDINGS:    Lungs/Airways/Pleura: The central airways are patent. No pleural   effusion. Since 2/9/2022 chest CT, overall worsening diffuse bronchial   mucoid impaction, groundglass and tree-in-bud nodularity involving the   right greater than left lung on a background of bronchiectasis and   bronchial wall thickening, with new focal consolidation at the posterior   basal right lower lobe.    Mediastinum/Lymph nodes: No thoracic adenopathy.    Heart and Vessels: Right chest wall port with tip terminating in the   distal SVC. Mid ascending aorta measures 4 cm. The pulmonary artery   measures 3.3 cm. The heart is normal in size. Mild coronary arterial   calcification is present. Aortic valvular and mitral annular   calcification also noted. No pericardial effusion.    Upper Abdomen: Stable cystic nodules arising from the pancreatic tail   measuring up to 2.1 cm.    Osseous structures and Soft Tissues: Stable numerous scattered sclerotic   lesions throughout the thoracolumbar spine since 2016, likely bone   islands.    IMPRESSION:  Compared to 2/9/2022 chest CT, worsening extensive bronchial impaction   and tree-in-bud nodularity with new right lower lobe consolidation on a   background of bronchiectasis.    --- End of Report ---      ACC: [id number] EXAM:  XR ABDOMEN PORTABLE URGENT 1V                          PROCEDURE DATE:  03/30/2022          INTERPRETATION:  CLINICAL INFORMATION: abdominal pain.    EXAM: 2 view abdominopelvic radiograph.    COMPARISON: Abdominal radiograph 08/27/2018    FINDINGS:  LLQ colostomy noted.  Moderate to severe colonic fecal load noted.  Nonobstructive bowel gas pattern.  No free air.  Degenerative changes of the visualized spine and bilateral hips.  S/P orthopedic fixation of the pubis and sacrum.    IMPRESSION:  Moderate to severe colonic fecal load.  Nonobstructive bowel gas pattern.    --- End of Report ---          < from: CT Abdomen and Pelvis w/ Oral Cont and w/ IV Cont (02.09.22 @ 13:07) >  EXAM: [id number] - CT ABDOMEN AND PELVIS OC IC  - ORDERED BY: SHANNON ELAM    EXAM: [id number] - CT CHEST IC  - ORDERED BY: SHANNON ELAM      PROCEDURE DATE:  02/09/2022           INTERPRETATION:  CLINICAL INFORMATION: History of anal cancer relapse s/p   surgical resection 2018; needs surveillance. History of tracheal stenosis   with stent. Recent pneumonia/lung nodule on 11/20/2021.    COMPARISON: CT of the chest dated 11/29/2021 and CT of the chest,   abdomen, and pelvis dated 07/26/2021    CONTRAST/COMPLICATIONS:  IV Contrast: Omnipaque 350  90 cc administered   10 cc discarded  Oral Contrast: Omnipaque 300  Complications: None reported at time of study completion  Comment: Patient received steroid premedication per protocol for a   reported contrast allergy.    PROCEDURE:  CT of the Chest, Abdomen and Pelvis was performed.  Sagittal and coronal reformats were performed.    FINDINGS:  CHEST:  LUNGS AND LARGE AIRWAYS: Persistent scattered areas of patchy/nodular   groundglass attenuation throughout both lungs, probably minimally   worsened. Note that areas of more confluent consolidation in the lingula   and left lower lobe are improved. Clustered/branching nodularity in the   periphery of the right middle and lower lobes associated with distal   subpleural atelectasis, unchanged. Findings consistent with ongoing   indolent infection versus noninfectious/inflammatory etiology. Patent   central airways. Mild paraseptal emphysema. Previously described 9 mm   right lung base subpleural nodule no longer seen. Few stable discrete   sub-4 mm pulmonary nodules, nonspecific.  PLEURA: No pleural effusion.  VESSELS: Mildly ectatic aorta and atherosclerotic change.  HEART: Heart size is normal. No pericardial effusion. Coronary artery and  aortic valvular calcification.  MEDIASTINUM AND MARANAD: No lymphadenopathy.  CHEST WALL AND LOWER NECK: Right chest wall Mediport with tip in SVC.    ABDOMEN AND PELVIS:  LIVER: Within normal limits.  BILE DUCTS: Normal caliber.  GALLBLADDER: Within normal limits.  SPLEEN: Within normal limits.  PANCREAS: Stable scattered pancreatic cysts, 2.2 cm and 1.5 cm in the   tail and a 1.1 cm in the body. Normal caliber main pancreatic duct.  ADRENALS: Within normal limits.  KIDNEYS/URETERS: Left renal cysts and bilateral too small to characterize   low attenuating renal foci.    BLADDER: Within normal limits, partially obscured by streak artifact from   orthopedic hardware.  REPRODUCTIVE ORGANS: Hysterectomy.    BOWEL: Abdominal perineal resection with left lower quadrant ostomy. No   obstruction. Appendix is normal.  PERITONEUM: No ascites.  VESSELS: Atherosclerotic changes.  RETROPERITONEUM/LYMPH NODES: No lymphadenopathy.  ABDOMINAL WALL: Lower ventral abdominal wall postsurgical   change/calcification. Gluteal/upper thigh soft tissue tissue   calcifications below the left ischial tuberosity.  BONES: Status post fixation of the pubis and sacrum. Numerous stable   scattered bone islands within the axial skeleton and pelvis. Avascular   necrosis within bilateral femoral heads without associated flattening.   Degenerative changes of the spine.    IMPRESSION:  No evidence of metastatic disease or pathologic adenopathy.  Persistent groundglass abnormality and nodularity as detailed above,   consistent with ongoing indolent infection versus   noninfectious-inflammatory process.  Additional stable findings as above.        --- End of Report ---    < end of copied text >

## 2022-03-31 NOTE — DIETITIAN INITIAL EVALUATION ADULT. - CONTINUE CURRENT NUTRITION CARE PLAN
Defer diet/fluid consistencies to medical team/SLP recommendations. continue current diet order as appropriate: consistent CHO diet. Team to provide/adjust insulin as needed to help maintain BG WNL/yes

## 2022-03-31 NOTE — DIETITIAN INITIAL EVALUATION ADULT. - PHYSCIAL ASSESSMENT
123% IBW  Skin: per wound care note 3/31 "Sacral/bilateral Buttocks deep tissue injury present on admission  Incontinence of bowel  Incontinence Dermatitis"

## 2022-03-31 NOTE — DIETITIAN NUTRITION RISK NOTIFICATION - TREATMENT: THE FOLLOWING DIET HAS BEEN RECOMMENDED
Diet, Consistent Carbohydrate w/Evening Snack:   Pureed (PUREED)  Supplement Feeding Modality:  Oral  Glucerna Shake Cans or Servings Per Day:  3       Frequency:  Daily (03-30-22 @ 14:53) [Active]

## 2022-03-31 NOTE — PROGRESS NOTE ADULT - PROBLEM SELECTOR PLAN 2
Multifactorial in setting of pneumonia, bronchiectasis exacerbation, likely asthma exacerbation. PE on differential however she has been on therapeutic AC for afib, patient has contrast to allergy with SOB as reaction. Unclear history of COPD, states her sister smokes and she has secondhand exposure, however COPD diagnosis was never fully established however she does have known severe persistent asthma. Discharged to rehab on O2, unclear how much but not on O2 usually at home.   - CT chest 3/29: worsening extensive bronchial impaction and tree in bud nodularity with new right lower lobe consolidation iso bronchiectasis. diffuse bronchial mucoid impaction  - Wean O2 as tolerated  - c/w methylprednisolone 8 mg qd   - Sputum Cx, will induce with hypertonic sal  - aggressive pulmonary toilet hygiene: chest vest, acapella, incentive spirometry, duonebs q6, hypertonic sal q12  - pulmonary Dr. Allison following Multifactorial in setting of pneumonia, bronchiectasis exacerbation, likely asthma exacerbation. PE on differential however she has been on therapeutic AC for afib, patient has contrast to allergy with SOB as reaction. Unclear history of COPD, states her sister smokes and she has secondhand exposure, however COPD diagnosis was never fully established however she does have known severe persistent asthma. Discharged to rehab on O2, unclear how much but not on O2 usually at home. +MRSA pneumonia  - CT chest 3/29: worsening extensive bronchial impaction and tree in bud nodularity with new right lower lobe consolidation iso bronchiectasis. diffuse bronchial mucoid impaction  - Wean O2 as tolerated  - c/w methylprednisolone 8 mg qd   - aggressive pulmonary toilet hygiene: chest vest, acapella, incentive spirometry, duonebs q6, hypertonic sal q12  - pulmonary Dr. Allison following

## 2022-03-31 NOTE — CONSULT NOTE ADULT - ASSESSMENT
Impression:    Sacral/bilateral Buttocks deep tissue injury present on admission  Incontinence of bowel  Incontinence Dermatitis    Recommend:  1.) topical therapy: sacral/buttock injury - cleanse with incontinence cleanser, pat dry, apply Triad ointment twice daily and PRN for incontinent episodes  2.) Incontinence Management - incontinence cleanser, pads, pericare BID, avoid diapers  3.) Maintain on an alternating air with low air loss surface  4.) Turn and reposition Q 2 hours  5.) Nutrition optimization  6.) Offload heels/feet with complete cair air fluidized boots; ensure that the soles of the feet are not resting on the foot board of the bed.    Care as per medicine. Will not actively follow but will remain available. Please recall for new issues of deterioration.  Upon [facility] [address] [place] [address] [phone]  Seen and discussed with clinical nurse  Thank you for this consult  Olamide Lewis, NP-C, CWOCN [id number]

## 2022-03-31 NOTE — DIETITIAN INITIAL EVALUATION ADULT. - ORAL INTAKE PTA/DIET HISTORY
Pt visited at bedside, unable to interview per pt non verbal/non arousal; discussed with RN. Noted shelfish allergy per chart review. Per H&P "Multiple episodes of vomiting. Pt reported constipation at rehab and was on lactulose with resolution of constipation". Per H&P, PTA supplements/ nutrition related Rx: NovoLOG, Lantus ,methylPREDNISolone.

## 2022-03-31 NOTE — CONSULT NOTE ADULT - SUBJECTIVE AND OBJECTIVE BOX
Wound Surgery Consult Note:    HPI:  71 year old woman with history of tracheobronchomalasia s/p tracheobronchoplasty, bronchiectasis, severe persistent asthma (steroid dependent), adrenal insuffiencey on chronic steroids, DM2, HTN, colorectal cancer s/p total colectomy now with colostomy, PAF on Eliquis presenting with about 3 weeks of productive cough, bilous vomiting, shortness of breath and lethargy. Pt was originally admitted to [facility] on March 8th and treated for pna, given antibiotics, stay c/b intubation 2/2 hypoxia, and discharged on Friday March 25th with oxygen back to rehab (required 3-5L NC, no O2 requirement at baseline). For past 2 days at rehab, pt with worsening SOB, productive cough with white/yellow phlegm, episodes of bilious emesis, increasing lethargy, and febrile (Tmax 105F per daughter). Pt denies headache, vision changes, chills, abdominal pain, palpitations, dysuria, changes in urination, or bloody stools.    In ED: CBC, CMP unremarkable, VBG: pH 7.46, pCO2 45, bicarb 32, lactate 2.1, urine with large leuk esterase conc, uWBC 197 with yeast-like cells, RVP neg, CXR limited but unremarkable. S/p cefepime, vanocmycin, 500 cc bolus, solu-medrol 40 mg IV. (28 Mar 2022 20:20)    Request for wound care consult for sacral/bilateral buttocks skin breakdown received from nursing. Ms. Bloom was encountered on an alternating air with low air loss surface. She is incontinent of stool and urine.  She was unable turn and reposition self in bed and is very weak. Her extreme immobility, inactivity, gross incontinence of urine and stool as well as poor nutritional status all contribute to her high risk for pressure injury development and hinder healing. Identification of the initial signs of deep tissue damage at the level of the skin within 72 hours of admission make this an injury that occurred prior to admission.    PAST MEDICAL & SURGICAL HISTORY:  Atrial fibrillation, paroxysmal, on eliquis  Diabetes, Type 2  COPD (chronic obstructive pulmonary disease)  Adrenal insufficiency, Medrol daily for over 50 years  Aortic insufficiency, moderate AR on echo 5/3/2018  Pelvic fracture  Asthma  Tracheobronchomalacia  diagnosed 2015, s/p bronchial thermoplasty 2016 (Dr Zapien); recent bronchoscopy 6/5/2018 revealed no evidence of tracheobronchomalacia in trachea or bronchial tubes  Colorectal cancer, 4/2018- last treatment , chemo and radiation  Rectal bleeding  Seizure, x 1 1/7/18  DVT (deep venous thrombosis), 15-20 years ago, took coumadin  TIA (transient ischemic attack), multiple, last 5 years ago - presents as right-sided weakness  History of partial hysterectomy, 30 years ago - fibroids  H/O total knee replacement, bilateral, 5 years ago  History of sinus surgery, multiple sinus surgeries  Exostosis of orbit, left, 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  H/O pelvic surgery, 5 years ago - s/p fracture  History of tracheomalacia  2015 - attempted tracheal stenting ([facility])- course complicated by obstruction, respiratory failure, multiple CPR attempts -  stent discontinued; 10/20/2016 Tracheobronchoplasty (Prolene Mesh) performed at [facility] by Dr Zapien  S/P bronchoscopy, 6/5/2018 - [facility] (Dr Zapien) no evidence of tracheobronchomalacia in trachea or bronchial tubes  Rectal bleeding, exam under anesthesia ([facility]) 2/2018    MEDICATIONS  (STANDING):  albuterol/ipratropium for Nebulization 3 milliLiter(s) Nebulizer every 6 hours  apixaban 5 milliGRAM(s) Oral every 12 hours  atorvastatin 20 milliGRAM(s) Oral at bedtime  cefepime   IVPB 2000 milliGRAM(s) IV Intermittent every 8 hours  chlorhexidine 2% Cloths 1 Application(s) Topical <User Schedule>  dextrose 5%. 1000 milliLiter(s) (50 mL/Hr) IV Continuous <Continuous>  dextrose 5%. 1000 milliLiter(s) (100 mL/Hr) IV Continuous <Continuous>  dextrose 50% Injectable 25 Gram(s) IV Push once  dextrose 50% Injectable 12.5 Gram(s) IV Push once  dextrose 50% Injectable 25 Gram(s) IV Push once  fluconAZOLE IVPB 100 milliGRAM(s) IV Intermittent every 24 hours  glucagon  Injectable 1 milliGRAM(s) IntraMuscular once  influenza  Vaccine (HIGH DOSE) 0.7 milliLiter(s) IntraMuscular once  insulin glargine Injectable (LANTUS) 10 Unit(s) SubCutaneous at bedtime  insulin lispro (ADMELOG) corrective regimen sliding scale   SubCutaneous three times a day before meals  insulin lispro (ADMELOG) corrective regimen sliding scale   SubCutaneous at bedtime  methylPREDNISolone 8 milliGRAM(s) Oral daily  mexiletine 200 milliGRAM(s) Oral three times a day  mupirocin 2% Ointment 1 Application(s) Both Nostrils two times a day  pantoprazole    Tablet 40 milliGRAM(s) Oral before breakfast  polyethylene glycol 3350 17 Gram(s) Oral daily  pregabalin 150 milliGRAM(s) Oral two times a day  senna 2 Tablet(s) Oral at bedtime  sodium chloride 7% Inhalation 4 milliLiter(s) Inhalation every 12 hours  vancomycin  IVPB 1000 milliGRAM(s) IV Intermittent <User Schedule>    MEDICATIONS  (PRN):  acetaminophen     Tablet .. 975 milliGRAM(s) Oral every 6 hours PRN Temp greater or equal to 38C (100.4F), Mild Pain (1 - 3)  dextrose Oral Gel 15 Gram(s) Oral once PRN Blood Glucose LESS THAN 70 milliGRAM(s)/deciliter  ondansetron Injectable 4 milliGRAM(s) IV Push every 8 hours PRN Nausea and/or Vomiting    Allergies    aspirin (Short breath)  Avelox (Short breath; Pruritus)  codeine (Short breath)  Dilaudid (Short breath)  iodine (Short breath; Swelling)  shellfish (Anaphylaxis)  tetanus toxoid (Short breath)  Valium (Short breath)    Intolerances    Vital Signs Last 24 Hrs  T(C): 37.3 (31 Mar 2022 05:47), Max: 39.4 (30 Mar 2022 16:34)  T(F): 99.1 (31 Mar 2022 05:47), Max: 102.9 (30 Mar 2022 16:34)  HR: 92 (31 Mar 2022 05:47) (88 - 118)  BP: 131/70 (31 Mar 2022 05:47) (102/54 - 160/69)  BP(mean): --  RR: 18 (31 Mar 2022 05:47) (18 - 19)  SpO2: 98% (31 Mar 2022 05:47) (88% - 98%)    Physical Exam:  General: Sleepy, thin, frail, ill  Respiratory: no SOB on room air  Gastrointestinal: soft NT/ND  Neurology: weakened strength & sensation grossly intact, following simple commands, no verbal response  Musculoskeletal: no contractures  Vascular: no BLE edema  Skin:  Sacral/bilateral buttocks with central deep maroon discoloration and peripheral superficially denuded skin with skin bridges L 5cm X W 5cm xD 0.1cm with pink wound bed, no necrotic tissue, and scant serosanguinous drainage, +TTP  No odor, increased warmth, erythema, induration, fluctuance    LABS:  03-31    134<L>  |  96  |  15  ----------------------------<  286<H>  4.4   |  27  |  0.40<L>    Ca    8.0<L>      31 Mar 2022 07:40  Phos  2.2     03-31  Mg     1.6     03-31    TPro  5.5<L>  /  Alb  2.3<L>  /  TBili  0.3  /  DBili  x   /  AST  54<H>  /  ALT  64<H>  /  AlkPhos  85  03-31                          10.1   6.91  )-----------( 165      ( 31 Mar 2022 07:38 )             32.8

## 2022-03-31 NOTE — DIETITIAN INITIAL EVALUATION ADULT. - OTHER INFO
- RN reports pt with ~26-50% meal consumption; as per flowsheets 51-75% supplement consumption (glucerna shake TID)  - Speech eval 3/30, recommended puree diet with thin liquids.     - RN denies pt with vomiting or diarrhea; pt with colostomy per EMR; last BM 3/29, fecal incontinence as per flowsheets. Pt currently on bowel regimens (senna, miralax).     Dosing wt: 72.6 kg  Bedscale wt, RD checked 3/31 59 kg ?accuracy   Per last RD note (12/3/21) pt wt was 63.4 kg  - Monitor wt trends      Pt with history DM,  (3/31); POCT  (3/30-31); A1C 9.5% (3/29/22); ordered for insulin lispro (ADMELOG) corrective regimen sliding scale and insulin glargine (LANTUS) in house; ordered for consistent CHO diet  - unable to provide nutrition therapy education ( consistent CHO diet ) at this time related to pt mental status/ communication ability.

## 2022-03-31 NOTE — SWALLOW FEES ASSESSMENT ADULT - LARYNGEAL PENETRATION DURING SWALLOW - SILENT
Trace penetration (suspected during) evidenced by trace laryngeal residue post swallow. Delayed wet cough noted. Eventually clears with multiple CUED swallows.

## 2022-03-31 NOTE — PROGRESS NOTE ADULT - PROBLEM SELECTOR PLAN 1
Febrile, tachycardia, tachypnea, lactate 2.1. Associated AHRF, productive cough. Recent hospital stay with intubation for pneumonia at OSH and intubated, unclear which antibiotics were given and duration of treatment. Likely in setting of pneumonia and bronchiectasis exacerbation. History of sputum Cx +Pseudomonas (Feb 2020) via bronchoscopy. Also with UA grossly positive, however pt without symptoms  - S/p cefepime and vancomycin in ED, 500 cc bolus, MRSA+, RVP neg  - Cont cefepime for Pseudomonas coverage and vancomycin given recent intubation (3/29 - )  - BCx, UCx pending  - Sputum Cx pending  - Consider ID consult Febrile, tachycardia, tachypnea, lactate 2.1. Associated AHRF, productive cough. Recent hospital stay with intubation for pneumonia at OSH and intubated, unclear which antibiotics were given and duration of treatment. Likely in setting of pneumonia and bronchiectasis exacerbation. History of sputum Cx +Pseudomonas (Feb 2020) via bronchoscopy. Also with UA grossly positive, however pt without symptoms  - S/p cefepime and vancomycin in ED, 500 cc bolus, MRSA+, RVP neg  - Cont cefepime for Pseudomonas coverage and vancomycin given recent intubation (3/29 - )  - BCx NGTD   - UCx <50K candida glabrata  - Sputum Cx +mrsa  - ID consult, appreciate recs

## 2022-03-31 NOTE — PROGRESS NOTE ADULT - SUBJECTIVE AND OBJECTIVE BOX
PROGRESS NOTE:   Authored by Raissa Nguyen MD  Internal Medicine      Patient is a 71y old  Female who presents with a chief complaint of sepsis (31 Mar 2022 05:29)      SUBJECTIVE / OVERNIGHT EVENTS: pt septic, yesterday sepsis ramirez, methylpred from 4 to 8, hypoglycemic to 61, patient seen and examined at bedside    MEDICATIONS  (STANDING):  albuterol/ipratropium for Nebulization 3 milliLiter(s) Nebulizer every 6 hours  apixaban 5 milliGRAM(s) Oral every 12 hours  atorvastatin 20 milliGRAM(s) Oral at bedtime  cefepime   IVPB 2000 milliGRAM(s) IV Intermittent every 8 hours  chlorhexidine 2% Cloths 1 Application(s) Topical <User Schedule>  dextrose 5%. 1000 milliLiter(s) (50 mL/Hr) IV Continuous <Continuous>  dextrose 5%. 1000 milliLiter(s) (100 mL/Hr) IV Continuous <Continuous>  dextrose 50% Injectable 25 Gram(s) IV Push once  dextrose 50% Injectable 12.5 Gram(s) IV Push once  dextrose 50% Injectable 25 Gram(s) IV Push once  fluconAZOLE IVPB 100 milliGRAM(s) IV Intermittent every 24 hours  glucagon  Injectable 1 milliGRAM(s) IntraMuscular once  influenza  Vaccine (HIGH DOSE) 0.7 milliLiter(s) IntraMuscular once  insulin glargine Injectable (LANTUS) 10 Unit(s) SubCutaneous at bedtime  insulin lispro (ADMELOG) corrective regimen sliding scale   SubCutaneous three times a day before meals  insulin lispro (ADMELOG) corrective regimen sliding scale   SubCutaneous at bedtime  methylPREDNISolone 8 milliGRAM(s) Oral daily  mexiletine 200 milliGRAM(s) Oral three times a day  mupirocin 2% Ointment 1 Application(s) Both Nostrils two times a day  pantoprazole    Tablet 40 milliGRAM(s) Oral before breakfast  polyethylene glycol 3350 17 Gram(s) Oral daily  pregabalin 150 milliGRAM(s) Oral two times a day  senna 2 Tablet(s) Oral at bedtime  sodium chloride 7% Inhalation 4 milliLiter(s) Inhalation every 12 hours  vancomycin  IVPB 1000 milliGRAM(s) IV Intermittent <User Schedule>    MEDICATIONS  (PRN):  acetaminophen     Tablet .. 975 milliGRAM(s) Oral every 6 hours PRN Temp greater or equal to 38C (100.4F), Mild Pain (1 - 3)  dextrose Oral Gel 15 Gram(s) Oral once PRN Blood Glucose LESS THAN 70 milliGRAM(s)/deciliter  ondansetron Injectable 4 milliGRAM(s) IV Push every 8 hours PRN Nausea and/or Vomiting      CAPILLARY BLOOD GLUCOSE      POCT Blood Glucose.: 239 mg/dL (30 Mar 2022 21:55)  POCT Blood Glucose.: 161 mg/dL (30 Mar 2022 17:47)  POCT Blood Glucose.: 62 mg/dL (30 Mar 2022 17:09)  POCT Blood Glucose.: 221 mg/dL (30 Mar 2022 12:59)  POCT Blood Glucose.: 251 mg/dL (30 Mar 2022 09:08)    I&O's Summary    30 Mar 2022 07:01  -  31 Mar 2022 07:00  --------------------------------------------------------  IN: 340 mL / OUT: 600 mL / NET: -260 mL        PHYSICAL EXAM:  Vital Signs Last 24 Hrs  T(C): 37.3 (31 Mar 2022 05:47), Max: 39.4 (30 Mar 2022 16:34)  T(F): 99.1 (31 Mar 2022 05:47), Max: 102.9 (30 Mar 2022 16:34)  HR: 92 (31 Mar 2022 05:47) (88 - 118)  BP: 131/70 (31 Mar 2022 05:47) (102/54 - 160/69)  BP(mean): --  RR: 18 (31 Mar 2022 05:47) (18 - 19)  SpO2: 98% (31 Mar 2022 05:47) (88% - 98%)  CONSTITUTIONAL: Well-groomed, in no apparent distress  EYES: No conjunctival or scleral injection, non-icteric;   ENMT: No external nasal lesions; MMM  NECK: Trachea midline without palpable neck mass; thyroid not enlarged and non-tender  RESPIRATORY: Breathing comfortably; no dullness to percussion; lungs CTA without wheeze/rhonchi/rales  CARDIOVASCULAR: +S1S2, RRR, no M/G/R; pedal pulses full and symmetric; no lower extremity edema  GASTROINTESTINAL: No palpable masses or tenderness, +BS throughout, no rebound/guarding; no hepatosplenomegaly; no hernia palpated  LYMPHATIC: No cervical LAD or tenderness  SKIN: No rashes or ulcers noted  NEUROLOGIC: CN II-XII intact; sensation intact in LEs b/l to light touch  PSYCHIATRIC: A+O x 3; mood and affect appropriate; appropriate insight and judgment    LABS:                        10.8   7.42  )-----------( 185      ( 30 Mar 2022 07:14 )             35.3     03-30    135  |  100  |  22  ----------------------------<  249<H>  4.4   |  24  |  0.43<L>    Ca    8.5      30 Mar 2022 07:13  Phos  1.4     03-30  Mg     1.8     03-30    TPro  5.8<L>  /  Alb  2.6<L>  /  TBili  0.3  /  DBili  x   /  AST  27  /  ALT  46<H>  /  AlkPhos  102  03-30              Culture - Sputum (collected 29 Mar 2022 06:25)  Source: .Sputum Sputum  Gram Stain (29 Mar 2022 12:07):    Few Squamous epithelial cells per low power field    Few polymorphonuclear leukocytes per low power field    Numerous Gram positive cocci in pairs, chains and clusters per oil power    field    Numerous Gram Negative Rods per oil power field    Few Gram Variable Rods per oil power field  Preliminary Report (30 Mar 2022 07:53):    Numerous Staphylococcus aureus    Culture - Urine (collected 28 Mar 2022 22:11)  Source: Clean Catch Clean Catch (Midstream)  Preliminary Report (29 Mar 2022 22:17):    Culture in progress    Culture - Blood (collected 28 Mar 2022 22:03)  Source: .Blood Blood-Peripheral  Preliminary Report (29 Mar 2022 23:01):    No growth to date.    Culture - Blood (collected 28 Mar 2022 22:03)  Source: .Blood Blood-Peripheral  Preliminary Report (29 Mar 2022 23:01):    No growth to date.        RADIOLOGY & ADDITIONAL TESTS:  Results Reviewed:   Imaging Personally Reviewed:  Electrocardiogram Personally Reviewed:    COORDINATION OF CARE:  Care Discussed with Consultants/Other Providers [Y/N]:  Prior or Outpatient Records Reviewed [Y/N]:   PROGRESS NOTE:   Authored by Raissa Nguyen MD  Internal Medicine      Patient is a 71y old  Female who presents with a chief complaint of sepsis (31 Mar 2022 05:29)      SUBJECTIVE / OVERNIGHT EVENTS: pt septic, yesterday sepsis ramirez, methylpred from 4 to 8, hypoglycemic to 61, patient seen and examined at bedside. reports that she feels much better today compared to yesterday     MEDICATIONS  (STANDING):  albuterol/ipratropium for Nebulization 3 milliLiter(s) Nebulizer every 6 hours  apixaban 5 milliGRAM(s) Oral every 12 hours  atorvastatin 20 milliGRAM(s) Oral at bedtime  cefepime   IVPB 2000 milliGRAM(s) IV Intermittent every 8 hours  chlorhexidine 2% Cloths 1 Application(s) Topical <User Schedule>  dextrose 5%. 1000 milliLiter(s) (50 mL/Hr) IV Continuous <Continuous>  dextrose 5%. 1000 milliLiter(s) (100 mL/Hr) IV Continuous <Continuous>  dextrose 50% Injectable 25 Gram(s) IV Push once  dextrose 50% Injectable 12.5 Gram(s) IV Push once  dextrose 50% Injectable 25 Gram(s) IV Push once  fluconAZOLE IVPB 100 milliGRAM(s) IV Intermittent every 24 hours  glucagon  Injectable 1 milliGRAM(s) IntraMuscular once  influenza  Vaccine (HIGH DOSE) 0.7 milliLiter(s) IntraMuscular once  insulin glargine Injectable (LANTUS) 10 Unit(s) SubCutaneous at bedtime  insulin lispro (ADMELOG) corrective regimen sliding scale   SubCutaneous three times a day before meals  insulin lispro (ADMELOG) corrective regimen sliding scale   SubCutaneous at bedtime  methylPREDNISolone 8 milliGRAM(s) Oral daily  mexiletine 200 milliGRAM(s) Oral three times a day  mupirocin 2% Ointment 1 Application(s) Both Nostrils two times a day  pantoprazole    Tablet 40 milliGRAM(s) Oral before breakfast  polyethylene glycol 3350 17 Gram(s) Oral daily  pregabalin 150 milliGRAM(s) Oral two times a day  senna 2 Tablet(s) Oral at bedtime  sodium chloride 7% Inhalation 4 milliLiter(s) Inhalation every 12 hours  vancomycin  IVPB 1000 milliGRAM(s) IV Intermittent <User Schedule>    MEDICATIONS  (PRN):  acetaminophen     Tablet .. 975 milliGRAM(s) Oral every 6 hours PRN Temp greater or equal to 38C (100.4F), Mild Pain (1 - 3)  dextrose Oral Gel 15 Gram(s) Oral once PRN Blood Glucose LESS THAN 70 milliGRAM(s)/deciliter  ondansetron Injectable 4 milliGRAM(s) IV Push every 8 hours PRN Nausea and/or Vomiting      CAPILLARY BLOOD GLUCOSE      POCT Blood Glucose.: 239 mg/dL (30 Mar 2022 21:55)  POCT Blood Glucose.: 161 mg/dL (30 Mar 2022 17:47)  POCT Blood Glucose.: 62 mg/dL (30 Mar 2022 17:09)  POCT Blood Glucose.: 221 mg/dL (30 Mar 2022 12:59)  POCT Blood Glucose.: 251 mg/dL (30 Mar 2022 09:08)    I&O's Summary    30 Mar 2022 07:01  -  31 Mar 2022 07:00  --------------------------------------------------------  IN: 340 mL / OUT: 600 mL / NET: -260 mL        PHYSICAL EXAM:  Vital Signs Last 24 Hrs  T(C): 37.3 (31 Mar 2022 05:47), Max: 39.4 (30 Mar 2022 16:34)  T(F): 99.1 (31 Mar 2022 05:47), Max: 102.9 (30 Mar 2022 16:34)  HR: 92 (31 Mar 2022 05:47) (88 - 118)  BP: 131/70 (31 Mar 2022 05:47) (102/54 - 160/69)  BP(mean): --  RR: 18 (31 Mar 2022 05:47) (18 - 19)  SpO2: 98% (31 Mar 2022 05:47) (88% - 98%)  CONSTITUTIONAL: Well-groomed, in no apparent distress  EYES: No conjunctival or scleral injection, non-icteric;   ENMT: No external nasal lesions; MMM  NECK: Trachea midline without palpable neck mass; thyroid not enlarged and non-tender  RESPIRATORY: Breathing comfortably; no dullness to percussion; lungs CTA without wheeze/rhonchi/rales  CARDIOVASCULAR: +S1S2, RRR, no M/G/R; pedal pulses full and symmetric; no lower extremity edema  GASTROINTESTINAL: No palpable masses or tenderness, +BS throughout, no rebound/guarding; no hepatosplenomegaly; no hernia palpated  LYMPHATIC: No cervical LAD or tenderness  SKIN: No rashes or ulcers noted  NEUROLOGIC: CN II-XII intact; sensation intact in LEs b/l to light touch  PSYCHIATRIC: A+O x 3; mood and affect appropriate; appropriate insight and judgment    LABS:                        10.8   7.42  )-----------( 185      ( 30 Mar 2022 07:14 )             35.3     03-30    135  |  100  |  22  ----------------------------<  249<H>  4.4   |  24  |  0.43<L>    Ca    8.5      30 Mar 2022 07:13  Phos  1.4     03-30  Mg     1.8     03-30    TPro  5.8<L>  /  Alb  2.6<L>  /  TBili  0.3  /  DBili  x   /  AST  27  /  ALT  46<H>  /  AlkPhos  102  03-30              Culture - Sputum (collected 29 Mar 2022 06:25)  Source: .Sputum Sputum  Gram Stain (29 Mar 2022 12:07):    Few Squamous epithelial cells per low power field    Few polymorphonuclear leukocytes per low power field    Numerous Gram positive cocci in pairs, chains and clusters per oil power    field    Numerous Gram Negative Rods per oil power field    Few Gram Variable Rods per oil power field  Preliminary Report (30 Mar 2022 07:53):    Numerous Staphylococcus aureus    Culture - Urine (collected 28 Mar 2022 22:11)  Source: Clean Catch Clean Catch (Midstream)  Preliminary Report (29 Mar 2022 22:17):    Culture in progress    Culture - Blood (collected 28 Mar 2022 22:03)  Source: .Blood Blood-Peripheral  Preliminary Report (29 Mar 2022 23:01):    No growth to date.    Culture - Blood (collected 28 Mar 2022 22:03)  Source: .Blood Blood-Peripheral  Preliminary Report (29 Mar 2022 23:01):    No growth to date.        RADIOLOGY & ADDITIONAL TESTS:  Results Reviewed:   Imaging Personally Reviewed:  Electrocardiogram Personally Reviewed:    COORDINATION OF CARE:  Care Discussed with Consultants/Other Providers [Y/N]:  Prior or Outpatient Records Reviewed [Y/N]:   PROGRESS NOTE:   Authored by Raissa Nguyen MD  Internal Medicine      Patient is a 71y old  Female who presents with a chief complaint of sepsis (31 Mar 2022 05:29)      SUBJECTIVE / OVERNIGHT EVENTS: pt septic, yesterday sepsis ramirez, methylpred from 4 to 8, hypoglycemic to 61, patient seen and examined at bedside. reports that she feels much better today compared to yesterday     MEDICATIONS  (STANDING):  albuterol/ipratropium for Nebulization 3 milliLiter(s) Nebulizer every 6 hours  apixaban 5 milliGRAM(s) Oral every 12 hours  atorvastatin 20 milliGRAM(s) Oral at bedtime  cefepime   IVPB 2000 milliGRAM(s) IV Intermittent every 8 hours  chlorhexidine 2% Cloths 1 Application(s) Topical <User Schedule>  dextrose 5%. 1000 milliLiter(s) (50 mL/Hr) IV Continuous <Continuous>  dextrose 5%. 1000 milliLiter(s) (100 mL/Hr) IV Continuous <Continuous>  dextrose 50% Injectable 25 Gram(s) IV Push once  dextrose 50% Injectable 12.5 Gram(s) IV Push once  dextrose 50% Injectable 25 Gram(s) IV Push once  fluconAZOLE IVPB 100 milliGRAM(s) IV Intermittent every 24 hours  glucagon  Injectable 1 milliGRAM(s) IntraMuscular once  influenza  Vaccine (HIGH DOSE) 0.7 milliLiter(s) IntraMuscular once  insulin glargine Injectable (LANTUS) 10 Unit(s) SubCutaneous at bedtime  insulin lispro (ADMELOG) corrective regimen sliding scale   SubCutaneous three times a day before meals  insulin lispro (ADMELOG) corrective regimen sliding scale   SubCutaneous at bedtime  methylPREDNISolone 8 milliGRAM(s) Oral daily  mexiletine 200 milliGRAM(s) Oral three times a day  mupirocin 2% Ointment 1 Application(s) Both Nostrils two times a day  pantoprazole    Tablet 40 milliGRAM(s) Oral before breakfast  polyethylene glycol 3350 17 Gram(s) Oral daily  pregabalin 150 milliGRAM(s) Oral two times a day  senna 2 Tablet(s) Oral at bedtime  sodium chloride 7% Inhalation 4 milliLiter(s) Inhalation every 12 hours  vancomycin  IVPB 1000 milliGRAM(s) IV Intermittent <User Schedule>    MEDICATIONS  (PRN):  acetaminophen     Tablet .. 975 milliGRAM(s) Oral every 6 hours PRN Temp greater or equal to 38C (100.4F), Mild Pain (1 - 3)  dextrose Oral Gel 15 Gram(s) Oral once PRN Blood Glucose LESS THAN 70 milliGRAM(s)/deciliter  ondansetron Injectable 4 milliGRAM(s) IV Push every 8 hours PRN Nausea and/or Vomiting      CAPILLARY BLOOD GLUCOSE      POCT Blood Glucose.: 239 mg/dL (30 Mar 2022 21:55)  POCT Blood Glucose.: 161 mg/dL (30 Mar 2022 17:47)  POCT Blood Glucose.: 62 mg/dL (30 Mar 2022 17:09)  POCT Blood Glucose.: 221 mg/dL (30 Mar 2022 12:59)  POCT Blood Glucose.: 251 mg/dL (30 Mar 2022 09:08)    I&O's Summary    30 Mar 2022 07:01  -  31 Mar 2022 07:00  --------------------------------------------------------  IN: 340 mL / OUT: 600 mL / NET: -260 mL        PHYSICAL EXAM:  Vital Signs Last 24 Hrs  T(C): 37.3 (31 Mar 2022 05:47), Max: 39.4 (30 Mar 2022 16:34)  T(F): 99.1 (31 Mar 2022 05:47), Max: 102.9 (30 Mar 2022 16:34)  HR: 92 (31 Mar 2022 05:47) (88 - 118)  BP: 131/70 (31 Mar 2022 05:47) (102/54 - 160/69)  BP(mean): --  RR: 18 (31 Mar 2022 05:47) (18 - 19)  SpO2: 98% (31 Mar 2022 05:47) (88% - 98%)  CONSTITUTIONAL: thin, not in acute distress  EYES: No conjunctival or scleral injection, non-icteric;   ENMT: No external nasal lesions; MMM  NECK: Trachea midline without palpable neck mass; thyroid not enlarged and non-tender  RESPIRATORY: Breathing comfortably; no dullness to percussion; lungs CTA without wheeze/rhonchi/rales  CARDIOVASCULAR: +S1S2, RRR, no M/G/R; pedal pulses full and symmetric; no lower extremity edema  GASTROINTESTINAL: No palpable masses or tenderness, +BS throughout, no rebound/guarding; no hepatosplenomegaly; no hernia palpated  LYMPHATIC: No cervical LAD or tenderness  SKIN: No rashes or ulcers noted  NEUROLOGIC: CN II-XII intact; sensation intact in LEs b/l to light touch  PSYCHIATRIC: A+O x 3; mood and affect appropriate; appropriate insight and judgment    LABS:                        10.8   7.42  )-----------( 185      ( 30 Mar 2022 07:14 )             35.3     03-30    135  |  100  |  22  ----------------------------<  249<H>  4.4   |  24  |  0.43<L>    Ca    8.5      30 Mar 2022 07:13  Phos  1.4     03-30  Mg     1.8     03-30    TPro  5.8<L>  /  Alb  2.6<L>  /  TBili  0.3  /  DBili  x   /  AST  27  /  ALT  46<H>  /  AlkPhos  102  03-30              Culture - Sputum (collected 29 Mar 2022 06:25)  Source: .Sputum Sputum  Gram Stain (29 Mar 2022 12:07):    Few Squamous epithelial cells per low power field    Few polymorphonuclear leukocytes per low power field    Numerous Gram positive cocci in pairs, chains and clusters per oil power    field    Numerous Gram Negative Rods per oil power field    Few Gram Variable Rods per oil power field  Preliminary Report (30 Mar 2022 07:53):    Numerous Staphylococcus aureus    Culture - Urine (collected 28 Mar 2022 22:11)  Source: Clean Catch Clean Catch (Midstream)  Preliminary Report (29 Mar 2022 22:17):    Culture in progress    Culture - Blood (collected 28 Mar 2022 22:03)  Source: .Blood Blood-Peripheral  Preliminary Report (29 Mar 2022 23:01):    No growth to date.    Culture - Blood (collected 28 Mar 2022 22:03)  Source: .Blood Blood-Peripheral  Preliminary Report (29 Mar 2022 23:01):    No growth to date.        RADIOLOGY & ADDITIONAL TESTS:  Results Reviewed:   Imaging Personally Reviewed:  Electrocardiogram Personally Reviewed:    COORDINATION OF CARE:  Care Discussed with Consultants/Other Providers [Y/N]:  Prior or Outpatient Records Reviewed [Y/N]:   PROGRESS NOTE:   Authored by Raissa Nguyen MD  Internal Medicine      Patient is a 71y old  Female who presents with a chief complaint of sepsis (31 Mar 2022 05:29)      SUBJECTIVE / OVERNIGHT EVENTS: pt septic, yesterday sepsis ramirez, methylpred from 4 to 8, hypoglycemic to 61, patient seen and examined at bedside. reports that she feels much better today compared to yesterday     MEDICATIONS  (STANDING):  albuterol/ipratropium for Nebulization 3 milliLiter(s) Nebulizer every 6 hours  apixaban 5 milliGRAM(s) Oral every 12 hours  atorvastatin 20 milliGRAM(s) Oral at bedtime  cefepime   IVPB 2000 milliGRAM(s) IV Intermittent every 8 hours  chlorhexidine 2% Cloths 1 Application(s) Topical <User Schedule>  dextrose 5%. 1000 milliLiter(s) (50 mL/Hr) IV Continuous <Continuous>  dextrose 5%. 1000 milliLiter(s) (100 mL/Hr) IV Continuous <Continuous>  dextrose 50% Injectable 25 Gram(s) IV Push once  dextrose 50% Injectable 12.5 Gram(s) IV Push once  dextrose 50% Injectable 25 Gram(s) IV Push once  fluconAZOLE IVPB 100 milliGRAM(s) IV Intermittent every 24 hours  glucagon  Injectable 1 milliGRAM(s) IntraMuscular once  influenza  Vaccine (HIGH DOSE) 0.7 milliLiter(s) IntraMuscular once  insulin glargine Injectable (LANTUS) 10 Unit(s) SubCutaneous at bedtime  insulin lispro (ADMELOG) corrective regimen sliding scale   SubCutaneous three times a day before meals  insulin lispro (ADMELOG) corrective regimen sliding scale   SubCutaneous at bedtime  methylPREDNISolone 8 milliGRAM(s) Oral daily  mexiletine 200 milliGRAM(s) Oral three times a day  mupirocin 2% Ointment 1 Application(s) Both Nostrils two times a day  pantoprazole    Tablet 40 milliGRAM(s) Oral before breakfast  polyethylene glycol 3350 17 Gram(s) Oral daily  pregabalin 150 milliGRAM(s) Oral two times a day  senna 2 Tablet(s) Oral at bedtime  sodium chloride 7% Inhalation 4 milliLiter(s) Inhalation every 12 hours  vancomycin  IVPB 1000 milliGRAM(s) IV Intermittent <User Schedule>    MEDICATIONS  (PRN):  acetaminophen     Tablet .. 975 milliGRAM(s) Oral every 6 hours PRN Temp greater or equal to 38C (100.4F), Mild Pain (1 - 3)  dextrose Oral Gel 15 Gram(s) Oral once PRN Blood Glucose LESS THAN 70 milliGRAM(s)/deciliter  ondansetron Injectable 4 milliGRAM(s) IV Push every 8 hours PRN Nausea and/or Vomiting      CAPILLARY BLOOD GLUCOSE      POCT Blood Glucose.: 239 mg/dL (30 Mar 2022 21:55)  POCT Blood Glucose.: 161 mg/dL (30 Mar 2022 17:47)  POCT Blood Glucose.: 62 mg/dL (30 Mar 2022 17:09)  POCT Blood Glucose.: 221 mg/dL (30 Mar 2022 12:59)  POCT Blood Glucose.: 251 mg/dL (30 Mar 2022 09:08)    I&O's Summary    30 Mar 2022 07:01  -  31 Mar 2022 07:00  --------------------------------------------------------  IN: 340 mL / OUT: 600 mL / NET: -260 mL        PHYSICAL EXAM:  Vital Signs Last 24 Hrs  T(C): 37.3 (31 Mar 2022 05:47), Max: 39.4 (30 Mar 2022 16:34)  T(F): 99.1 (31 Mar 2022 05:47), Max: 102.9 (30 Mar 2022 16:34)  HR: 92 (31 Mar 2022 05:47) (88 - 118)  BP: 131/70 (31 Mar 2022 05:47) (102/54 - 160/69)  BP(mean): --  RR: 18 (31 Mar 2022 05:47) (18 - 19)  SpO2: 98% (31 Mar 2022 05:47) (88% - 98%)  CONSTITUTIONAL: thin, not in acute distress, hypophonic, appears chronically ill, but comfortable, alert, answering all questions appropriately  EYES: No conjunctival or scleral injection, non-icteric;   ENMT: No external nasal lesions; MMM  RESPIRATORY: Breathing comfortably; no dullness to percussion; lungs CTA without wheeze/rhonchi/rales +cough  CARDIOVASCULAR: +S1S2, RRR, no M/G/R; pedal pulses full and symmetric; no lower extremity edema  GASTROINTESTINAL: No palpable masses or tenderness, +BS throughout, no rebound/guarding; no hepatosplenomegaly; no hernia palpated, +colostomy  LYMPHATIC: No cervical LAD or tenderness  SKIN: No rashes or ulcers noted  NEUROLOGIC: moves all extremities spontaneously, follows commands x4  PSYCHIATRIC: A+O x 3; mood and affect appropriate; appropriate insight and judgment    LABS:                        10.8   7.42  )-----------( 185      ( 30 Mar 2022 07:14 )             35.3     03-30    135  |  100  |  22  ----------------------------<  249<H>  4.4   |  24  |  0.43<L>    Ca    8.5      30 Mar 2022 07:13  Phos  1.4     03-30  Mg     1.8     03-30    TPro  5.8<L>  /  Alb  2.6<L>  /  TBili  0.3  /  DBili  x   /  AST  27  /  ALT  46<H>  /  AlkPhos  102  03-30              Culture - Sputum (collected 29 Mar 2022 06:25)  Source: .Sputum Sputum  Gram Stain (29 Mar 2022 12:07):    Few Squamous epithelial cells per low power field    Few polymorphonuclear leukocytes per low power field    Numerous Gram positive cocci in pairs, chains and clusters per oil power    field    Numerous Gram Negative Rods per oil power field    Few Gram Variable Rods per oil power field  Preliminary Report (30 Mar 2022 07:53):    Numerous Staphylococcus aureus    Culture - Urine (collected 28 Mar 2022 22:11)  Source: Clean Catch Clean Catch (Midstream)  Preliminary Report (29 Mar 2022 22:17):    Culture in progress    Culture - Blood (collected 28 Mar 2022 22:03)  Source: .Blood Blood-Peripheral  Preliminary Report (29 Mar 2022 23:01):    No growth to date.    Culture - Blood (collected 28 Mar 2022 22:03)  Source: .Blood Blood-Peripheral  Preliminary Report (29 Mar 2022 23:01):    No growth to date.        RADIOLOGY & ADDITIONAL TESTS:  Results Reviewed:   Imaging Personally Reviewed:  Electrocardiogram Personally Reviewed:    COORDINATION OF CARE:  Care Discussed with Consultants/Other Providers [Y/N]:  Prior or Outpatient Records Reviewed [Y/N]:

## 2022-03-31 NOTE — SWALLOW FEES ASSESSMENT ADULT - COMMENTS
Hx cont: In ED: uWBC 197 with yeast-like cells. CXR limited but unremarkable. S/p cefepime, vanocmycin, and solu-medrol. Admitted with severe SEPSIS and acute hypoxic respiratory failure possibly from PNA and concomitant bronchiectasis exacerbation. Pulm consulted. Exacerbation of bronchiectasis due to PNA. Multifactorial dyspnea-TBM, CB, severe persistent asthma, ? PNA, ? VC dysfunction, debility, anemia, CAD. ENT consulted for Aphonia. Laryngoscope at bedside revealed diffuse yellow patches of debris throughout oropharynx (?thrush) with normal vocal cord function. Recommending Diflucan x 7 days and ENT will rescope after treatment. 3/30: slightly better overall-less cough.     IMAGING:  CT CHEST: 3/28/22  IMPRESSION:  Compared to 2/9/2022 chest CT, worsening extensive bronchial impaction and tree-in-bud nodularity with new right lower lobe consolidation on a   background of bronchiectasis.    Pt is known to this service from bedside swallow evaluation completed 1/17/19 with rx to continue regular texture diet and thin liquids. Choose soft, moist foods. No overt, clinical s/s of laryngeal penetration/aspiration on exam. Patient reports a "slow" passage of food as she points to anterior portion of neck. Pt does endorse s/s of dysphagia prior to admit, "sometimes it kind of gets stuck but it works it's way down." ?subtle b/l contact ulcers   +small posterior gap on vocal fold   e/o trace-mild dorothea-pharyngeal thrush

## 2022-03-31 NOTE — PROGRESS NOTE ADULT - PROBLEM SELECTOR PLAN 10
DVT: Eliquis  Diet: pureed, speech/swallow eval passed, nutrition consult, pending recs  PT: [facility]  LDA: colostomy, PIV  Code: Full code - patient hypoglycemic to low 60s on 3/30, likely iso sepsis, adding premeal, along with decreasing methylpred to 4 mg  - s/p 0.5 A d50  - ctm FS with meals  - lantus and low dose sliding scale  - will hold premeals, ctm insulin requirement

## 2022-03-31 NOTE — SWALLOW FEES ASSESSMENT ADULT - SLP GENERAL OBSERVATIONS
Pt was received at bedside awake. +NC (4L), +hypophonia, +appeared generally deconditioned, +baseline wet, non-productive cough (x3 prior to PO trials). Able to follow basic commands.

## 2022-03-31 NOTE — DIETITIAN INITIAL EVALUATION ADULT. - RD TO REMAIN AVAILABLE
Will continue to monitor PO intake, weight, labs, skin, GI status, diet. Malnutrition sticker placed, RD to follow-up as per protocol/yes

## 2022-03-31 NOTE — SWALLOW FEES ASSESSMENT ADULT - ADDITIONAL RECOMMENDATIONS
GOALS:  1. Pt/family/caregiver will demonstrate understanding and carryover of dysphagia management (safe swallow guidelines, compensatory strategies, dysphagia diet).  2. Pt will complete dysphagia exercises to improve swallow function.  3. Pt will tolerate recommended diet with no overt, clinical s/s of aspiration.    *Consider repeat objective evaluation of swallow after course of dysphagia rehab to determine if able to upgrade texture of diet.

## 2022-03-31 NOTE — DIETITIAN INITIAL EVALUATION ADULT. - PERTINENT LABORATORY DATA
03-31 @ 07:40: Na 134<L>, BUN 15, Cr 0.40<L>, <H>, K+ 4.4, Phos 2.2<L>, Mg 1.6, Alk Phos 85, ALT/SGPT 64<H>, AST/SGOT 54<H>, HbA1c --    A1C with Estimated Average Glucose Result: 9.5 % (03-29-22 @ 13:50)  A1C with Estimated Average Glucose Result: 7.8 % (11-30-21 @ 09:05)    POCT Blood Glucose.: 293 mg/dL (03-31-22 @ 08:58)  POCT Blood Glucose.: 239 mg/dL (03-30-22 @ 21:55)  POCT Blood Glucose.: 161 mg/dL (03-30-22 @ 17:47)  POCT Blood Glucose.: 62 mg/dL (03-30-22 @ 17:09)  POCT Blood Glucose.: 221 mg/dL (03-30-22 @ 12:59)

## 2022-03-31 NOTE — PROGRESS NOTE ADULT - ASSESSMENT
70 y/o F w/tracheobronchomalacia s/p tracheobronchoplasty, severe persistent asthma, bronchiectasis, and colon cancer s/p colectomy admitted with likely exacerbation of bronchiectasis due to pneumonia.    - Supplemental O2 as needed goal O2 sat >= 90%  - Broad spectrum abx including pseudomonal coverage  - Airway clearance, acapella device, bronchodilators, chest PT  - Continue home asthma regimen  - Repeat CT scan in 3 months for follow up of new pulmonary nodule  *******************************************  3/29-no signif changes-ID f/up                                          SEE BELOW:  3/30-ENT evaln= fungus, ID f/up, diflucan in place; ? need for fob here  3/31-no new issues-weakness continues

## 2022-03-31 NOTE — PROGRESS NOTE ADULT - PROBLEM SELECTOR PLAN 4
Likely exacerbation in setting of sepsis. S/p tracheoplasty with residual frequent mucus production  - Patient's daughter called Dr. Allison to make him aware of admission, will see patient in AM  - Cont IV antibiotics with Pseudomonal coverage for likely acute exacerbation  - Cont home steroids for severe persistent asthma and adrenal insufficiency  - Pt uses nebulizer treatment at home, duonebs q6h standing. ensure patient is sitting upright for all inhaled meds due to hx of tracheobronchomalacia  - Cont Spiriva  - Incentive spirometry

## 2022-03-31 NOTE — DIETITIAN INITIAL EVALUATION ADULT. - PROBLEM SELECTOR PLAN 2
Multifactorial in setting of pneumonia, bronchiectasis exacerbation, likely asthma exacerbation. PE on differential however she has been on therapeutic AC for afib, patient has contrast to allergy with SOB as reaction. Unclear history of COPD, states her sister smokes and she has secondhand exposure, however COPD diagnosis was never fully established however she does have known severe persistent asthma. Discharged to rehab on O2, unclear how much but not on O2 usually at home.   - Wean O2 as tolerated  - F/u CT chest non-con   - Cont methylprednisolone 8 mg qd  - Sputum Cx

## 2022-03-31 NOTE — DIETITIAN INITIAL EVALUATION ADULT. - ETIOLOGY
DR. TESFAYE called pt for clarification.    Pt is requesting a referral for Dr. TRELL Oliva-ortho.  Pt has pain in her hands.  Pt states she had surgery a year ago and now her hands are bothering her a lot and she needs a referral to go back.  Last referral issued 03/10/21 has .  Please put in order/referral and return to me.  Thank you  Zo soler   increased physiological demands of stress factor and wound healing inadequate protein energy intake in settings of hypoxia abdominal pain and lethargy

## 2022-04-01 LAB
-  STAPHYLOCOCCUS EPIDERMIDIS, METHICILLIN RESISTANT: SIGNIFICANT CHANGE UP
ALBUMIN SERPL ELPH-MCNC: 2.5 G/DL — LOW (ref 3.3–5)
ALP SERPL-CCNC: 80 U/L — SIGNIFICANT CHANGE UP (ref 40–120)
ALT FLD-CCNC: 57 U/L — HIGH (ref 10–45)
ANION GAP SERPL CALC-SCNC: 8 MMOL/L — SIGNIFICANT CHANGE UP (ref 5–17)
AST SERPL-CCNC: 41 U/L — HIGH (ref 10–40)
BASOPHILS # BLD AUTO: 0 K/UL — SIGNIFICANT CHANGE UP (ref 0–0.2)
BASOPHILS NFR BLD AUTO: 0 % — SIGNIFICANT CHANGE UP (ref 0–2)
BILIRUB SERPL-MCNC: 0.3 MG/DL — SIGNIFICANT CHANGE UP (ref 0.2–1.2)
BUN SERPL-MCNC: 12 MG/DL — SIGNIFICANT CHANGE UP (ref 7–23)
CALCIUM SERPL-MCNC: 8.4 MG/DL — SIGNIFICANT CHANGE UP (ref 8.4–10.5)
CHLORIDE SERPL-SCNC: 98 MMOL/L — SIGNIFICANT CHANGE UP (ref 96–108)
CO2 SERPL-SCNC: 30 MMOL/L — SIGNIFICANT CHANGE UP (ref 22–31)
CREAT SERPL-MCNC: 0.42 MG/DL — LOW (ref 0.5–1.3)
CULTURE RESULTS: SIGNIFICANT CHANGE UP
EGFR: 103 ML/MIN/1.73M2 — SIGNIFICANT CHANGE UP
EOSINOPHIL # BLD AUTO: 0.09 K/UL — SIGNIFICANT CHANGE UP (ref 0–0.5)
EOSINOPHIL NFR BLD AUTO: 1.7 % — SIGNIFICANT CHANGE UP (ref 0–6)
GIANT PLATELETS BLD QL SMEAR: PRESENT — SIGNIFICANT CHANGE UP
GLUCOSE BLDC GLUCOMTR-MCNC: 220 MG/DL — HIGH (ref 70–99)
GLUCOSE BLDC GLUCOMTR-MCNC: 273 MG/DL — HIGH (ref 70–99)
GLUCOSE BLDC GLUCOMTR-MCNC: 277 MG/DL — HIGH (ref 70–99)
GLUCOSE BLDC GLUCOMTR-MCNC: 295 MG/DL — HIGH (ref 70–99)
GLUCOSE SERPL-MCNC: 202 MG/DL — HIGH (ref 70–99)
HCT VFR BLD CALC: 32.6 % — LOW (ref 34.5–45)
HGB BLD-MCNC: 9.9 G/DL — LOW (ref 11.5–15.5)
LYMPHOCYTES # BLD AUTO: 0.42 K/UL — LOW (ref 1–3.3)
LYMPHOCYTES # BLD AUTO: 7.8 % — LOW (ref 13–44)
MAGNESIUM SERPL-MCNC: 1.8 MG/DL — SIGNIFICANT CHANGE UP (ref 1.6–2.6)
MANUAL SMEAR VERIFICATION: SIGNIFICANT CHANGE UP
MCHC RBC-ENTMCNC: 22.5 PG — LOW (ref 27–34)
MCHC RBC-ENTMCNC: 30.4 GM/DL — LOW (ref 32–36)
MCV RBC AUTO: 74.1 FL — LOW (ref 80–100)
METHOD TYPE: SIGNIFICANT CHANGE UP
MONOCYTES # BLD AUTO: 0.14 K/UL — SIGNIFICANT CHANGE UP (ref 0–0.9)
MONOCYTES NFR BLD AUTO: 2.6 % — SIGNIFICANT CHANGE UP (ref 2–14)
MYELOCYTES NFR BLD: 1.7 % — HIGH (ref 0–0)
NEUTROPHILS # BLD AUTO: 4.61 K/UL — SIGNIFICANT CHANGE UP (ref 1.8–7.4)
NEUTROPHILS NFR BLD AUTO: 79.3 % — HIGH (ref 43–77)
NEUTS BAND # BLD: 6.9 % — SIGNIFICANT CHANGE UP (ref 0–8)
ORGANISM # SPEC MICROSCOPIC CNT: SIGNIFICANT CHANGE UP
ORGANISM # SPEC MICROSCOPIC CNT: SIGNIFICANT CHANGE UP
PHOSPHATE SERPL-MCNC: 2 MG/DL — LOW (ref 2.5–4.5)
PLAT MORPH BLD: ABNORMAL
PLATELET # BLD AUTO: 190 K/UL — SIGNIFICANT CHANGE UP (ref 150–400)
POTASSIUM SERPL-MCNC: 3.8 MMOL/L — SIGNIFICANT CHANGE UP (ref 3.5–5.3)
POTASSIUM SERPL-SCNC: 3.8 MMOL/L — SIGNIFICANT CHANGE UP (ref 3.5–5.3)
PROT SERPL-MCNC: 5.7 G/DL — LOW (ref 6–8.3)
RBC # BLD: 4.4 M/UL — SIGNIFICANT CHANGE UP (ref 3.8–5.2)
RBC # FLD: 18.4 % — HIGH (ref 10.3–14.5)
RBC BLD AUTO: SIGNIFICANT CHANGE UP
SODIUM SERPL-SCNC: 136 MMOL/L — SIGNIFICANT CHANGE UP (ref 135–145)
SPECIMEN SOURCE: SIGNIFICANT CHANGE UP
VANCOMYCIN TROUGH SERPL-MCNC: 15.3 UG/ML — SIGNIFICANT CHANGE UP (ref 10–20)
WBC # BLD: 5.35 K/UL — SIGNIFICANT CHANGE UP (ref 3.8–10.5)
WBC # FLD AUTO: 5.35 K/UL — SIGNIFICANT CHANGE UP (ref 3.8–10.5)

## 2022-04-01 PROCEDURE — 99232 SBSQ HOSP IP/OBS MODERATE 35: CPT

## 2022-04-01 PROCEDURE — 99233 SBSQ HOSP IP/OBS HIGH 50: CPT | Mod: GC

## 2022-04-01 RX ORDER — SODIUM,POTASSIUM PHOSPHATES 278-250MG
1 POWDER IN PACKET (EA) ORAL ONCE
Refills: 0 | Status: COMPLETED | OUTPATIENT
Start: 2022-04-01 | End: 2022-04-01

## 2022-04-01 RX ORDER — VANCOMYCIN HCL 1 G
1000 VIAL (EA) INTRAVENOUS EVERY 12 HOURS
Refills: 0 | Status: DISCONTINUED | OUTPATIENT
Start: 2022-04-01 | End: 2022-04-01

## 2022-04-01 RX ORDER — INSULIN LISPRO 100/ML
3 VIAL (ML) SUBCUTANEOUS
Refills: 0 | Status: DISCONTINUED | OUTPATIENT
Start: 2022-04-01 | End: 2022-04-02

## 2022-04-01 RX ORDER — VANCOMYCIN HCL 1 G
VIAL (EA) INTRAVENOUS
Refills: 0 | Status: DISCONTINUED | OUTPATIENT
Start: 2022-04-01 | End: 2022-04-01

## 2022-04-01 RX ORDER — VANCOMYCIN HCL 1 G
1000 VIAL (EA) INTRAVENOUS ONCE
Refills: 0 | Status: DISCONTINUED | OUTPATIENT
Start: 2022-04-01 | End: 2022-04-01

## 2022-04-01 RX ORDER — VANCOMYCIN HCL 1 G
1000 VIAL (EA) INTRAVENOUS EVERY 12 HOURS
Refills: 0 | Status: DISCONTINUED | OUTPATIENT
Start: 2022-04-01 | End: 2022-04-06

## 2022-04-01 RX ADMIN — Medication 1 PACKET(S): at 10:37

## 2022-04-01 RX ADMIN — INSULIN GLARGINE 10 UNIT(S): 100 INJECTION, SOLUTION SUBCUTANEOUS at 22:23

## 2022-04-01 RX ADMIN — FLUCONAZOLE 100 MILLIGRAM(S): 150 TABLET ORAL at 06:30

## 2022-04-01 RX ADMIN — Medication 8 MILLIGRAM(S): at 06:19

## 2022-04-01 RX ADMIN — ZINC SULFATE TAB 220 MG (50 MG ZINC EQUIVALENT) 220 MILLIGRAM(S): 220 (50 ZN) TAB at 12:24

## 2022-04-01 RX ADMIN — SENNA PLUS 2 TABLET(S): 8.6 TABLET ORAL at 22:24

## 2022-04-01 RX ADMIN — CEFEPIME 100 MILLIGRAM(S): 1 INJECTION, POWDER, FOR SOLUTION INTRAMUSCULAR; INTRAVENOUS at 10:06

## 2022-04-01 RX ADMIN — ATORVASTATIN CALCIUM 20 MILLIGRAM(S): 80 TABLET, FILM COATED ORAL at 22:24

## 2022-04-01 RX ADMIN — Medication 150 MILLIGRAM(S): at 06:26

## 2022-04-01 RX ADMIN — Medication 1 TABLET(S): at 12:25

## 2022-04-01 RX ADMIN — MEXILETINE HYDROCHLORIDE 200 MILLIGRAM(S): 150 CAPSULE ORAL at 06:20

## 2022-04-01 RX ADMIN — MEXILETINE HYDROCHLORIDE 200 MILLIGRAM(S): 150 CAPSULE ORAL at 13:03

## 2022-04-01 RX ADMIN — Medication 250 MILLIGRAM(S): at 13:03

## 2022-04-01 RX ADMIN — SODIUM CHLORIDE 4 MILLILITER(S): 9 INJECTION INTRAMUSCULAR; INTRAVENOUS; SUBCUTANEOUS at 18:06

## 2022-04-01 RX ADMIN — Medication 500 MILLIGRAM(S): at 12:25

## 2022-04-01 RX ADMIN — Medication 3 MILLILITER(S): at 23:22

## 2022-04-01 RX ADMIN — APIXABAN 5 MILLIGRAM(S): 2.5 TABLET, FILM COATED ORAL at 06:23

## 2022-04-01 RX ADMIN — Medication 250 MILLIGRAM(S): at 02:05

## 2022-04-01 RX ADMIN — MEXILETINE HYDROCHLORIDE 200 MILLIGRAM(S): 150 CAPSULE ORAL at 22:24

## 2022-04-01 RX ADMIN — Medication 3 MILLILITER(S): at 06:23

## 2022-04-01 RX ADMIN — Medication 3: at 17:53

## 2022-04-01 RX ADMIN — APIXABAN 5 MILLIGRAM(S): 2.5 TABLET, FILM COATED ORAL at 17:29

## 2022-04-01 RX ADMIN — Medication 3: at 12:24

## 2022-04-01 RX ADMIN — MUPIROCIN 1 APPLICATION(S): 20 OINTMENT TOPICAL at 06:22

## 2022-04-01 RX ADMIN — POLYETHYLENE GLYCOL 3350 17 GRAM(S): 17 POWDER, FOR SOLUTION ORAL at 22:23

## 2022-04-01 RX ADMIN — MUPIROCIN 1 APPLICATION(S): 20 OINTMENT TOPICAL at 17:44

## 2022-04-01 RX ADMIN — Medication 1: at 22:24

## 2022-04-01 RX ADMIN — PANTOPRAZOLE SODIUM 40 MILLIGRAM(S): 20 TABLET, DELAYED RELEASE ORAL at 06:21

## 2022-04-01 RX ADMIN — CHLORHEXIDINE GLUCONATE 1 APPLICATION(S): 213 SOLUTION TOPICAL at 07:30

## 2022-04-01 RX ADMIN — CEFEPIME 100 MILLIGRAM(S): 1 INJECTION, POWDER, FOR SOLUTION INTRAMUSCULAR; INTRAVENOUS at 17:29

## 2022-04-01 RX ADMIN — Medication 3 MILLILITER(S): at 17:29

## 2022-04-01 RX ADMIN — Medication 3 UNIT(S): at 17:53

## 2022-04-01 RX ADMIN — Medication 150 MILLIGRAM(S): at 17:30

## 2022-04-01 RX ADMIN — Medication 3 MILLILITER(S): at 12:25

## 2022-04-01 RX ADMIN — SODIUM CHLORIDE 4 MILLILITER(S): 9 INJECTION INTRAMUSCULAR; INTRAVENOUS; SUBCUTANEOUS at 06:22

## 2022-04-01 RX ADMIN — Medication 2: at 08:39

## 2022-04-01 NOTE — PROGRESS NOTE ADULT - SUBJECTIVE AND OBJECTIVE BOX
CHIEF COMPLAINT: f/up sob, chronic resp failure, TBM, severe asthma, allergic rhinitis-better as per pt-voice and breathing    Interval Events: tx to isolation room    REVIEW OF SYSTEMS:  Constitutional: No fevers or chills. No weight loss. + fatigue or generalized malaise.  Eyes: No itching or discharge from the eyes  ENT: No ear pain. No ear discharge. No nasal congestion. No post nasal drip. No epistaxis. No throat pain. No sore throat. No difficulty swallowing.   CV: No chest pain. No palpitations. No lightheadedness or dizziness.   Resp: No dyspnea at rest. No dyspnea on exertion. No orthopnea. No wheezing. + cough. No stridor. No sputum production. No chest pain with respiration.  GI: No nausea. No vomiting. No diarrhea.  MSK: No joint pain or pain in any extremities  Integumentary: No skin lesions. No pedal edema.  Neurological: + gross motor weakness. No sensory changes.  [+ ] All other systems negative  [ ] Unable to assess ROS because ________    OBJECTIVE:  ICU Vital Signs Last 24 Hrs  T(C): 37.1 (01 Apr 2022 04:21), Max: 38.8 (31 Mar 2022 23:52)  T(F): 98.8 (01 Apr 2022 04:21), Max: 101.8 (31 Mar 2022 23:52)  HR: 84 (01 Apr 2022 04:21) (84 - 103)  BP: 147/70 (01 Apr 2022 04:21) (124/65 - 147/70)  BP(mean): --  ABP: --  ABP(mean): --  RR: 16 (01 Apr 2022 04:21) (16 - 22)  SpO2: 98% (01 Apr 2022 04:21) (95% - 99%)        03-30 @ 07:01  -  03-31 @ 07:00  --------------------------------------------------------  IN: 340 mL / OUT: 600 mL / NET: -260 mL    03-31 @ 07:01  -  04-01 @ 05:21  --------------------------------------------------------  IN: 420 mL / OUT: 550 mL / NET: -130 mL      CAPILLARY BLOOD GLUCOSE      POCT Blood Glucose.: 239 mg/dL (31 Mar 2022 22:03)      PHYSICAL EXAM: NAD  General: Awake, alert, oriented X 3.   HEENT: Atraumatic, normocephalic.                 Mallampatti Grade 2                No nasal congestion.                No tonsillar or pharyngeal exudates.  Lymph Nodes: No palpable lymphadenopathy  Neck: No JVD. No carotid bruit.   Respiratory: abnormal chest expansion                         Normal percussion                         Normal and equal air entry                         mild exp wheeze, no rhonchi or rales.  Cardiovascular: S1 S2 normal. No murmurs, rubs or gallops.   Abdomen: Soft, non-tender, non-distended. No organomegaly. Normoactive bowel sounds.  Extremities: Warm to touch. Peripheral pulse palpable. No pedal edema.   Skin: No rashes or skin lesions  Neurological: Motor and sensory examination equal and abnormal in all four extremities.  Psychiatry: Appropriate mood and affect.    HOSPITAL MEDICATIONS:  MEDICATIONS  (STANDING):  albuterol/ipratropium for Nebulization 3 milliLiter(s) Nebulizer every 6 hours  apixaban 5 milliGRAM(s) Oral every 12 hours  ascorbic acid 500 milliGRAM(s) Oral daily  atorvastatin 20 milliGRAM(s) Oral at bedtime  cefepime   IVPB 2000 milliGRAM(s) IV Intermittent every 8 hours  chlorhexidine 2% Cloths 1 Application(s) Topical <User Schedule>  dextrose 5%. 1000 milliLiter(s) (100 mL/Hr) IV Continuous <Continuous>  dextrose 5%. 1000 milliLiter(s) (50 mL/Hr) IV Continuous <Continuous>  dextrose 50% Injectable 25 Gram(s) IV Push once  dextrose 50% Injectable 12.5 Gram(s) IV Push once  dextrose 50% Injectable 25 Gram(s) IV Push once  fluconAZOLE IVPB 200 milliGRAM(s) IV Intermittent every 24 hours  glucagon  Injectable 1 milliGRAM(s) IntraMuscular once  influenza  Vaccine (HIGH DOSE) 0.7 milliLiter(s) IntraMuscular once  insulin glargine Injectable (LANTUS) 10 Unit(s) SubCutaneous at bedtime  insulin lispro (ADMELOG) corrective regimen sliding scale   SubCutaneous three times a day before meals  insulin lispro (ADMELOG) corrective regimen sliding scale   SubCutaneous at bedtime  methylPREDNISolone 8 milliGRAM(s) Oral daily  mexiletine 200 milliGRAM(s) Oral three times a day  multivitamin 1 Tablet(s) Oral daily  mupirocin 2% Ointment 1 Application(s) Both Nostrils two times a day  pantoprazole    Tablet 40 milliGRAM(s) Oral before breakfast  polyethylene glycol 3350 17 Gram(s) Oral daily  pregabalin 150 milliGRAM(s) Oral two times a day  senna 2 Tablet(s) Oral at bedtime  sodium chloride 7% Inhalation 4 milliLiter(s) Inhalation every 12 hours  vancomycin  IVPB 1000 milliGRAM(s) IV Intermittent <User Schedule>  zinc sulfate 220 milliGRAM(s) Oral daily    MEDICATIONS  (PRN):  acetaminophen     Tablet .. 975 milliGRAM(s) Oral every 6 hours PRN Temp greater or equal to 38C (100.4F), Mild Pain (1 - 3)  dextrose Oral Gel 15 Gram(s) Oral once PRN Blood Glucose LESS THAN 70 milliGRAM(s)/deciliter  ondansetron Injectable 4 milliGRAM(s) IV Push every 8 hours PRN Nausea and/or Vomiting      LABS:                        10.1   6.91  )-----------( 165      ( 31 Mar 2022 07:38 )             32.8     03-31    134<L>  |  96  |  15  ----------------------------<  286<H>  4.4   |  27  |  0.40<L>    Ca    8.0<L>      31 Mar 2022 07:40  Phos  2.2     03-31  Mg     1.6     03-31    TPro  5.5<L>  /  Alb  2.3<L>  /  TBili  0.3  /  DBili  x   /  AST  54<H>  /  ALT  64<H>  /  AlkPhos  85  03-31              MICROBIOLOGY:     RADIOLOGY:  [ ] Reviewed and interpreted by me    Point of Care Ultrasound Findings:    PFT:    EKG:

## 2022-04-01 NOTE — PROVIDER CONTACT NOTE (CRITICAL VALUE NOTIFICATION) - SITUATION
Sputum culture resulted  MRSA  positive
Critical lab received stating that blood cultures received from 3/30/22 came back positive for growth in aerobic bottle and gram positive cocci clusters.

## 2022-04-01 NOTE — OCCUPATIONAL THERAPY INITIAL EVALUATION ADULT - ADDITIONAL COMMENTS
For past 2 days at rehab, pt with worsening SOB, productive cough with white/yellow phlegm, episodes of bilious emesis, increasing lethargy, and febrile.    per PT-Pt came from rehab. Unable to perform standing mobility in >3 week. Prior to first admission 3/8/22, patient living in house with sister, independent with all mobility, no AD for ambulation. No falls in the past 6 months.

## 2022-04-01 NOTE — PROGRESS NOTE ADULT - PROBLEM SELECTOR PLAN 2
Multifactorial in setting of pneumonia, bronchiectasis exacerbation, likely asthma exacerbation. PE on differential however she has been on therapeutic AC for afib, patient has contrast to allergy with SOB as reaction. Unclear history of COPD, states her sister smokes and she has secondhand exposure, however COPD diagnosis was never fully established however she does have known severe persistent asthma. Discharged to rehab on O2, unclear how much but not on O2 usually at home. +MRSA pneumonia  - CT chest 3/29: worsening extensive bronchial impaction and tree in bud nodularity with new right lower lobe consolidation iso bronchiectasis. diffuse bronchial mucoid impaction  - Wean O2 as tolerated  - c/w methylprednisolone 8 mg qd   - aggressive pulmonary toilet hygiene: chest vest, acapella, incentive spirometry, duonebs q6, hypertonic sal q12  - pulmonary Dr. Allison following

## 2022-04-01 NOTE — PROGRESS NOTE ADULT - SUBJECTIVE AND OBJECTIVE BOX
CC: F/U for Wound infection    Saw/spoke to patient. Patient improved. States feels much better today. No other new events. Still fevers.    Allergies  aspirin (Short breath)  Avelox (Short breath; Pruritus)  codeine (Short breath)  Dilaudid (Short breath)  iodine (Short breath; Swelling)  shellfish (Anaphylaxis)  tetanus toxoid (Short breath)  Valium (Short breath)    ANTIMICROBIALS:  cefepime   IVPB 2000 every 8 hours  fluconAZOLE IVPB 200 every 24 hours  vancomycin  IVPB 1000 every 12 hours    PE:    Vital Signs Last 24 Hrs  T(C): 36.6 (01 Apr 2022 12:56), Max: 38.8 (31 Mar 2022 23:52)  T(F): 97.9 (01 Apr 2022 12:56), Max: 101.8 (31 Mar 2022 23:52)  HR: 88 (01 Apr 2022 12:56) (84 - 103)  BP: 134/76 (01 Apr 2022 12:56) (125/71 - 147/70)  RR: 19 (01 Apr 2022 12:56) (16 - 22)  SpO2: 99% (01 Apr 2022 12:56) (95% - 99%)    Gen: AOx3, NAD, non-toxic  Resp: Breathing comfortably, RA    LABS:                        9.9    5.35  )-----------( 190      ( 01 Apr 2022 07:34 )             32.6     04-01    136  |  98  |  12  ----------------------------<  202<H>  3.8   |  30  |  0.42<L>    Ca    8.4      01 Apr 2022 07:34  Phos  2.0     04-01  Mg     1.8     04-01    TPro  5.7<L>  /  Alb  2.5<L>  /  TBili  0.3  /  DBili  x   /  AST  41<H>  /  ALT  57<H>  /  AlkPhos  80  04-01    MICROBIOLOGY:    .Blood Blood  03-30-22   Growth in aerobic bottle: Gram Positive Cocci in Clusters  ***Blood Panel PCR results on this specimen are available  approximately 3 hours after the Gram stain result.***  Gram stain, PCR, and/or culture results may not always  correspond due to difference in methodologies.  ************************************************************  This PCR assay was performed by multiplex PCR. This  Assay tests for 66 bacterial and resistance gene targets.  Please refer to the [facility] test directory  at [email or web address] for details.  --  Blood Culture PCR    .Sputum Sputum  03-29-22   Numerous Methicillin Resistant Staphylococcus aureus  Normal Respiratory Jessica absent  --  Methicillin resistant Staphylococcus aureus    Clean Catch Clean Catch (Midstream)  03-28-22   10,000 - 49,000 CFU/mL Candida glabrata "Susceptibilities not performed"  --  --    .Blood Blood-Peripheral  03-28-22   No growth to date.  --  --    Rapid RVP Result: NotDetec (03-28 @ 20:44)  Rapid RVP Result: NotDetec (03-28 @ 17:23)    RADIOLOGY:    3/28    IMPRESSION:  Compared to 2/9/2022 chest CT, worsening extensive bronchial impaction   and tree-in-bud nodularity with new right lower lobe consolidation on a   background of bronchiectasis.

## 2022-04-01 NOTE — PROGRESS NOTE ADULT - ASSESSMENT
70 y/o F w/tracheobronchomalacia s/p tracheobronchoplasty, severe persistent asthma, bronchiectasis, and colon cancer s/p colectomy admitted with likely exacerbation of bronchiectasis due to pneumonia.    - Supplemental O2 as needed goal O2 sat >= 90%  - Broad spectrum abx including pseudomonal coverage  - Airway clearance, acapella device, bronchodilators, chest PT  - Continue home asthma regimen  - Repeat CT scan in 3 months for follow up of new pulmonary nodule  *******************************************  3/29-no signif changes-ID f/up                                          SEE BELOW:  3/30-ENT evaln= fungus, ID f/up, diflucan in place; ? need for fob here  3/31-no new issues-weakness continues  4/1-tx to isolation room-ID evaln-MRSA and pseudomonas as well as achromobacter and kleb agree with vancomycin and cefepime for now

## 2022-04-01 NOTE — PROGRESS NOTE ADULT - TIME BILLING
as above:  no new issues--s/p ENT evaln-thrush--diflucan in place for 7 days (D4)  multifactorial dyspnea-TBM, CB, severe persistent asthma, ? PNA, thrush , debility, anemia, CAD--O2 NC sat above 90%  ?PNA/bronchiectasis-f/up sputum cx-prior pseudomonas- (s/p ID formal evaln-RISHABH Liz/Girish et al)-on cefepime/vanco D5-MRSA and            pseudomonas as well as achromobacter and kleb   agreed with vancomycin and cefepime     TBM-CB/brochiectasis-acapella/vest rx, incentive spirometry--? fob for additional sputum?--utilize vest rx  asthma-medrol 4 mg (chronic dosing) , spiriva/symbicort, duoneb q 6, singulair 10 q hs, hypertonic saline  allergy-claritin/flonase                                 *****dysphonia/VC dysfunction-thrush-ENT evaln/swallow evaln   gerd-ppi  abnormal CT-nodule-f/up 3 months                    cards-on mult rx-to continue  PT-OOB    DVT prophylaxis              Decub care  DC planning-will need extensive rehab      (all inhaled meds and eating must be done in chair-efficacy and asp. risk)    Eulalio Allison MD-Pulmonary   [phone].

## 2022-04-01 NOTE — PROGRESS NOTE ADULT - ASSESSMENT
70 yo F with history of tracheobronchomalasia s/p tracheobronchoplasty, bronchiectasis, severe persistent asthma (steroid dependent), adrenal insuffiencey on chronic steroids, DM2, HTN, colorectal cancer s/p total colectomy now with colostomy, with 3 weeks of productive cough, bilous vomiting, shortness of breath and lethargy.   At OSH March 8th and treated for pna, given antibiotics, stay c/b intubation 2/2 hypoxia, and discharged on Friday March 25th   At rehab, SOB, cough, fever, then presents to ED for further care  Note presence of mediport  UA+, yeast cells  RVP neg  Possible thrush seen on laryngoscope--given Fluconazole  3/31 pt with wound care consult: Skin:  Sacral/bilateral buttocks with central deep maroon discoloration and peripheral superficially denuded skin with skin bridges L 5cm X W 5cm xD 0.1cm with pink wound bed, no necrotic tissue, and scant serosanguinous drainage, +TTP  Ongoing fevers  BCX with CoNS 1/4  UA+, with low CFU glabrata  3/29 Sputum CX MRSA  CT with area of consolidation in setting bronchiectasis  Uncertain cause fever--BCX is likely contam; chest imaging possible PNA with MRSA in sputum? Lower suspicion for fungal UTI, but note presence of low CFU glabrata  Continue empiric coverage for now  Overall, Fever, positive culture finding, bandemia, elevated LFTs  - Vanco 1g q 12 (monitor levels)  - Cefepime 2g q 8  - Fluconazole 200mg q 24  - F/U pending cultures  - F/U fungal studies--fungitell, aspergillus  - Wound care to sacral wound  - If fevers not improved, check CT A/P  - F/U repeat BCXs (I suspect CoNS is contam)    Pepito Valladares MD  Contact on TEAMS messaging from 9am - 5pm  From 5pm-9am, and on weekends call [phone]

## 2022-04-01 NOTE — PROGRESS NOTE ADULT - SUBJECTIVE AND OBJECTIVE BOX
PROGRESS NOTE:   Authored by Raissa Nguyen MD  Internal Medicine      Patient is a 71y old  Female who presents with a chief complaint of sepsis (01 Apr 2022 05:21)      SUBJECTIVE / OVERNIGHT EVENTS: febrile overnight, MRSA+ blood cx 1/2, patient seen and examined at bedside    MEDICATIONS  (STANDING):  albuterol/ipratropium for Nebulization 3 milliLiter(s) Nebulizer every 6 hours  apixaban 5 milliGRAM(s) Oral every 12 hours  ascorbic acid 500 milliGRAM(s) Oral daily  atorvastatin 20 milliGRAM(s) Oral at bedtime  cefepime   IVPB 2000 milliGRAM(s) IV Intermittent every 8 hours  chlorhexidine 2% Cloths 1 Application(s) Topical <User Schedule>  dextrose 5%. 1000 milliLiter(s) (100 mL/Hr) IV Continuous <Continuous>  dextrose 5%. 1000 milliLiter(s) (50 mL/Hr) IV Continuous <Continuous>  dextrose 50% Injectable 25 Gram(s) IV Push once  dextrose 50% Injectable 12.5 Gram(s) IV Push once  dextrose 50% Injectable 25 Gram(s) IV Push once  fluconAZOLE IVPB 200 milliGRAM(s) IV Intermittent every 24 hours  glucagon  Injectable 1 milliGRAM(s) IntraMuscular once  influenza  Vaccine (HIGH DOSE) 0.7 milliLiter(s) IntraMuscular once  insulin glargine Injectable (LANTUS) 10 Unit(s) SubCutaneous at bedtime  insulin lispro (ADMELOG) corrective regimen sliding scale   SubCutaneous three times a day before meals  insulin lispro (ADMELOG) corrective regimen sliding scale   SubCutaneous at bedtime  methylPREDNISolone 8 milliGRAM(s) Oral daily  mexiletine 200 milliGRAM(s) Oral three times a day  multivitamin 1 Tablet(s) Oral daily  mupirocin 2% Ointment 1 Application(s) Both Nostrils two times a day  pantoprazole    Tablet 40 milliGRAM(s) Oral before breakfast  polyethylene glycol 3350 17 Gram(s) Oral daily  pregabalin 150 milliGRAM(s) Oral two times a day  senna 2 Tablet(s) Oral at bedtime  sodium chloride 7% Inhalation 4 milliLiter(s) Inhalation every 12 hours  vancomycin  IVPB 1000 milliGRAM(s) IV Intermittent <User Schedule>  zinc sulfate 220 milliGRAM(s) Oral daily    MEDICATIONS  (PRN):  acetaminophen     Tablet .. 975 milliGRAM(s) Oral every 6 hours PRN Temp greater or equal to 38C (100.4F), Mild Pain (1 - 3)  dextrose Oral Gel 15 Gram(s) Oral once PRN Blood Glucose LESS THAN 70 milliGRAM(s)/deciliter  ondansetron Injectable 4 milliGRAM(s) IV Push every 8 hours PRN Nausea and/or Vomiting      CAPILLARY BLOOD GLUCOSE      POCT Blood Glucose.: 239 mg/dL (31 Mar 2022 22:03)  POCT Blood Glucose.: 294 mg/dL (31 Mar 2022 17:36)  POCT Blood Glucose.: 233 mg/dL (31 Mar 2022 12:56)  POCT Blood Glucose.: 293 mg/dL (31 Mar 2022 08:58)    I&O's Summary    30 Mar 2022 07:01  -  31 Mar 2022 07:00  --------------------------------------------------------  IN: 340 mL / OUT: 600 mL / NET: -260 mL    31 Mar 2022 07:01  -  01 Apr 2022 06:50  --------------------------------------------------------  IN: 420 mL / OUT: 550 mL / NET: -130 mL        PHYSICAL EXAM:  Vital Signs Last 24 Hrs  T(C): 37.1 (01 Apr 2022 04:21), Max: 38.8 (31 Mar 2022 23:52)  T(F): 98.8 (01 Apr 2022 04:21), Max: 101.8 (31 Mar 2022 23:52)  HR: 84 (01 Apr 2022 04:21) (84 - 103)  BP: 147/70 (01 Apr 2022 04:21) (124/65 - 147/70)  BP(mean): --  RR: 16 (01 Apr 2022 04:21) (16 - 22)  SpO2: 98% (01 Apr 2022 04:21) (95% - 99%)  CONSTITUTIONAL: Well-groomed, in no apparent distress  EYES: No conjunctival or scleral injection, non-icteric;   ENMT: No external nasal lesions; MMM  NECK: Trachea midline without palpable neck mass; thyroid not enlarged and non-tender  RESPIRATORY: Breathing comfortably; no dullness to percussion; lungs CTA without wheeze/rhonchi/rales  CARDIOVASCULAR: +S1S2, RRR, no M/G/R; pedal pulses full and symmetric; no lower extremity edema  GASTROINTESTINAL: No palpable masses or tenderness, +BS throughout, no rebound/guarding; no hepatosplenomegaly; no hernia palpated  LYMPHATIC: No cervical LAD or tenderness  SKIN: No rashes or ulcers noted  NEUROLOGIC: CN II-XII intact; sensation intact in LEs b/l to light touch  PSYCHIATRIC: A+O x 3; mood and affect appropriate; appropriate insight and judgment    LABS:                        10.1   6.91  )-----------( 165      ( 31 Mar 2022 07:38 )             32.8     03-31    134<L>  |  96  |  15  ----------------------------<  286<H>  4.4   |  27  |  0.40<L>    Ca    8.0<L>      31 Mar 2022 07:40  Phos  2.2     03-31  Mg     1.6     03-31    TPro  5.5<L>  /  Alb  2.3<L>  /  TBili  0.3  /  DBili  x   /  AST  54<H>  /  ALT  64<H>  /  AlkPhos  85  03-31              Culture - Blood (collected 30 Mar 2022 21:55)  Source: .Blood Blood  Preliminary Report (31 Mar 2022 22:01):    No growth to date.    Culture - Blood (collected 30 Mar 2022 21:55)  Source: .Blood Blood  Gram Stain (31 Mar 2022 22:03):    Growth in aerobic bottle: Gram Positive Cocci in Clusters  Preliminary Report (31 Mar 2022 22:03):    Growth in aerobic bottle: Gram Positive Cocci in Clusters    ***Blood Panel PCR results on this specimen are available    approximately 3 hours after the Gram stain result.***    Gram stain, PCR, and/or culture results may not always    correspond due to difference in methodologies.    ************************************************************    This PCR assay was performed by multiplex PCR. This    Assay tests for 66 bacterial and resistance gene targets.    Please refer to the [facility] test directory    at [email or web address] for details.  Organism: Blood Culture PCR (01 Apr 2022 00:32)  Organism: Blood Culture PCR (01 Apr 2022 00:32)        RADIOLOGY & ADDITIONAL TESTS:  Results Reviewed:   Imaging Personally Reviewed:  Electrocardiogram Personally Reviewed:    COORDINATION OF CARE:  Care Discussed with Consultants/Other Providers [Y/N]:  Prior or Outpatient Records Reviewed [Y/N]:   PROGRESS NOTE:   Authored by Raissa Nguyen MD  Internal Medicine      Patient is a 71y old  Female who presents with a chief complaint of sepsis (01 Apr 2022 05:21)      SUBJECTIVE / OVERNIGHT EVENTS: febrile overnight, MRSA+ blood cx 1/2, patient seen and examined at bedside. reports that she is feeling better, sacral wound hurts. still has purulent cough, blood speckled is improving.     MEDICATIONS  (STANDING):  albuterol/ipratropium for Nebulization 3 milliLiter(s) Nebulizer every 6 hours  apixaban 5 milliGRAM(s) Oral every 12 hours  ascorbic acid 500 milliGRAM(s) Oral daily  atorvastatin 20 milliGRAM(s) Oral at bedtime  cefepime   IVPB 2000 milliGRAM(s) IV Intermittent every 8 hours  chlorhexidine 2% Cloths 1 Application(s) Topical <User Schedule>  dextrose 5%. 1000 milliLiter(s) (100 mL/Hr) IV Continuous <Continuous>  dextrose 5%. 1000 milliLiter(s) (50 mL/Hr) IV Continuous <Continuous>  dextrose 50% Injectable 25 Gram(s) IV Push once  dextrose 50% Injectable 12.5 Gram(s) IV Push once  dextrose 50% Injectable 25 Gram(s) IV Push once  fluconAZOLE IVPB 200 milliGRAM(s) IV Intermittent every 24 hours  glucagon  Injectable 1 milliGRAM(s) IntraMuscular once  influenza  Vaccine (HIGH DOSE) 0.7 milliLiter(s) IntraMuscular once  insulin glargine Injectable (LANTUS) 10 Unit(s) SubCutaneous at bedtime  insulin lispro (ADMELOG) corrective regimen sliding scale   SubCutaneous three times a day before meals  insulin lispro (ADMELOG) corrective regimen sliding scale   SubCutaneous at bedtime  methylPREDNISolone 8 milliGRAM(s) Oral daily  mexiletine 200 milliGRAM(s) Oral three times a day  multivitamin 1 Tablet(s) Oral daily  mupirocin 2% Ointment 1 Application(s) Both Nostrils two times a day  pantoprazole    Tablet 40 milliGRAM(s) Oral before breakfast  polyethylene glycol 3350 17 Gram(s) Oral daily  pregabalin 150 milliGRAM(s) Oral two times a day  senna 2 Tablet(s) Oral at bedtime  sodium chloride 7% Inhalation 4 milliLiter(s) Inhalation every 12 hours  vancomycin  IVPB 1000 milliGRAM(s) IV Intermittent <User Schedule>  zinc sulfate 220 milliGRAM(s) Oral daily    MEDICATIONS  (PRN):  acetaminophen     Tablet .. 975 milliGRAM(s) Oral every 6 hours PRN Temp greater or equal to 38C (100.4F), Mild Pain (1 - 3)  dextrose Oral Gel 15 Gram(s) Oral once PRN Blood Glucose LESS THAN 70 milliGRAM(s)/deciliter  ondansetron Injectable 4 milliGRAM(s) IV Push every 8 hours PRN Nausea and/or Vomiting      CAPILLARY BLOOD GLUCOSE      POCT Blood Glucose.: 239 mg/dL (31 Mar 2022 22:03)  POCT Blood Glucose.: 294 mg/dL (31 Mar 2022 17:36)  POCT Blood Glucose.: 233 mg/dL (31 Mar 2022 12:56)  POCT Blood Glucose.: 293 mg/dL (31 Mar 2022 08:58)    I&O's Summary    30 Mar 2022 07:01  -  31 Mar 2022 07:00  --------------------------------------------------------  IN: 340 mL / OUT: 600 mL / NET: -260 mL    31 Mar 2022 07:01  -  01 Apr 2022 06:50  --------------------------------------------------------  IN: 420 mL / OUT: 550 mL / NET: -130 mL        PHYSICAL EXAM:  Vital Signs Last 24 Hrs  T(C): 37.1 (01 Apr 2022 04:21), Max: 38.8 (31 Mar 2022 23:52)  T(F): 98.8 (01 Apr 2022 04:21), Max: 101.8 (31 Mar 2022 23:52)  HR: 84 (01 Apr 2022 04:21) (84 - 103)  BP: 147/70 (01 Apr 2022 04:21) (124/65 - 147/70)  BP(mean): --  RR: 16 (01 Apr 2022 04:21) (16 - 22)  SpO2: 98% (01 Apr 2022 04:21) (95% - 99%)  CONSTITUTIONAL: alert  EYES: No conjunctival or scleral injection, non-icteric;   ENMT: No external nasal lesions; MMM  NECK: Trachea midline without palpable neck mass; thyroid not enlarged and non-tender  RESPIRATORY: Breathing comfortably; no dullness to percussion; lungs CTA without wheeze/rhonchi/rales  CARDIOVASCULAR: +S1S2, RRR, no M/G/R; pedal pulses full and symmetric; no lower extremity edema  GASTROINTESTINAL: No palpable masses or tenderness, +BS throughout, no rebound/guarding; no hepatosplenomegaly; no hernia palpated  LYMPHATIC: No cervical LAD or tenderness  SKIN: No rashes or ulcers noted  NEUROLOGIC: nonfocal, moves all extremities spontaneously,   PSYCHIATRIC: A+O x 3; mood and affect appropriate; appropriate insight and judgment    LABS:                        10.1   6.91  )-----------( 165      ( 31 Mar 2022 07:38 )             32.8     03-31    134<L>  |  96  |  15  ----------------------------<  286<H>  4.4   |  27  |  0.40<L>    Ca    8.0<L>      31 Mar 2022 07:40  Phos  2.2     03-31  Mg     1.6     03-31    TPro  5.5<L>  /  Alb  2.3<L>  /  TBili  0.3  /  DBili  x   /  AST  54<H>  /  ALT  64<H>  /  AlkPhos  85  03-31              Culture - Blood (collected 30 Mar 2022 21:55)  Source: .Blood Blood  Preliminary Report (31 Mar 2022 22:01):    No growth to date.    Culture - Blood (collected 30 Mar 2022 21:55)  Source: .Blood Blood  Gram Stain (31 Mar 2022 22:03):    Growth in aerobic bottle: Gram Positive Cocci in Clusters  Preliminary Report (31 Mar 2022 22:03):    Growth in aerobic bottle: Gram Positive Cocci in Clusters    ***Blood Panel PCR results on this specimen are available    approximately 3 hours after the Gram stain result.***    Gram stain, PCR, and/or culture results may not always    correspond due to difference in methodologies.    ************************************************************    This PCR assay was performed by multiplex PCR. This    Assay tests for 66 bacterial and resistance gene targets.    Please refer to the [facility] test directory    at [email or web address] for details.  Organism: Blood Culture PCR (01 Apr 2022 00:32)  Organism: Blood Culture PCR (01 Apr 2022 00:32)        RADIOLOGY & ADDITIONAL TESTS:  Results Reviewed:   Imaging Personally Reviewed:  Electrocardiogram Personally Reviewed:    COORDINATION OF CARE:  Care Discussed with Consultants/Other Providers [Y/N]:  Prior or Outpatient Records Reviewed [Y/N]:   PROGRESS NOTE:   Authored by Raissa Nguyen MD  Internal Medicine      Patient is a 71y old  Female who presents with a chief complaint of sepsis (01 Apr 2022 05:21)      SUBJECTIVE / OVERNIGHT EVENTS: febrile overnight, MRSA+ blood cx 1/2, patient seen and examined at bedside. reports that she is feeling better, sacral wound hurts. still has purulent cough, blood speckled is improving.     MEDICATIONS  (STANDING):  albuterol/ipratropium for Nebulization 3 milliLiter(s) Nebulizer every 6 hours  apixaban 5 milliGRAM(s) Oral every 12 hours  ascorbic acid 500 milliGRAM(s) Oral daily  atorvastatin 20 milliGRAM(s) Oral at bedtime  cefepime   IVPB 2000 milliGRAM(s) IV Intermittent every 8 hours  chlorhexidine 2% Cloths 1 Application(s) Topical <User Schedule>  dextrose 5%. 1000 milliLiter(s) (100 mL/Hr) IV Continuous <Continuous>  dextrose 5%. 1000 milliLiter(s) (50 mL/Hr) IV Continuous <Continuous>  dextrose 50% Injectable 25 Gram(s) IV Push once  dextrose 50% Injectable 12.5 Gram(s) IV Push once  dextrose 50% Injectable 25 Gram(s) IV Push once  fluconAZOLE IVPB 200 milliGRAM(s) IV Intermittent every 24 hours  glucagon  Injectable 1 milliGRAM(s) IntraMuscular once  influenza  Vaccine (HIGH DOSE) 0.7 milliLiter(s) IntraMuscular once  insulin glargine Injectable (LANTUS) 10 Unit(s) SubCutaneous at bedtime  insulin lispro (ADMELOG) corrective regimen sliding scale   SubCutaneous three times a day before meals  insulin lispro (ADMELOG) corrective regimen sliding scale   SubCutaneous at bedtime  methylPREDNISolone 8 milliGRAM(s) Oral daily  mexiletine 200 milliGRAM(s) Oral three times a day  multivitamin 1 Tablet(s) Oral daily  mupirocin 2% Ointment 1 Application(s) Both Nostrils two times a day  pantoprazole    Tablet 40 milliGRAM(s) Oral before breakfast  polyethylene glycol 3350 17 Gram(s) Oral daily  pregabalin 150 milliGRAM(s) Oral two times a day  senna 2 Tablet(s) Oral at bedtime  sodium chloride 7% Inhalation 4 milliLiter(s) Inhalation every 12 hours  vancomycin  IVPB 1000 milliGRAM(s) IV Intermittent <User Schedule>  zinc sulfate 220 milliGRAM(s) Oral daily    MEDICATIONS  (PRN):  acetaminophen     Tablet .. 975 milliGRAM(s) Oral every 6 hours PRN Temp greater or equal to 38C (100.4F), Mild Pain (1 - 3)  dextrose Oral Gel 15 Gram(s) Oral once PRN Blood Glucose LESS THAN 70 milliGRAM(s)/deciliter  ondansetron Injectable 4 milliGRAM(s) IV Push every 8 hours PRN Nausea and/or Vomiting      CAPILLARY BLOOD GLUCOSE      POCT Blood Glucose.: 239 mg/dL (31 Mar 2022 22:03)  POCT Blood Glucose.: 294 mg/dL (31 Mar 2022 17:36)  POCT Blood Glucose.: 233 mg/dL (31 Mar 2022 12:56)  POCT Blood Glucose.: 293 mg/dL (31 Mar 2022 08:58)    I&O's Summary    30 Mar 2022 07:01  -  31 Mar 2022 07:00  --------------------------------------------------------  IN: 340 mL / OUT: 600 mL / NET: -260 mL    31 Mar 2022 07:01  -  01 Apr 2022 06:50  --------------------------------------------------------  IN: 420 mL / OUT: 550 mL / NET: -130 mL        PHYSICAL EXAM:  Vital Signs Last 24 Hrs  T(C): 37.1 (01 Apr 2022 04:21), Max: 38.8 (31 Mar 2022 23:52)  T(F): 98.8 (01 Apr 2022 04:21), Max: 101.8 (31 Mar 2022 23:52)  HR: 84 (01 Apr 2022 04:21) (84 - 103)  BP: 147/70 (01 Apr 2022 04:21) (124/65 - 147/70)  BP(mean): --  RR: 16 (01 Apr 2022 04:21) (16 - 22)  SpO2: 98% (01 Apr 2022 04:21) (95% - 99%)  CONSTITUTIONAL: alert, comfortably laying in bed, not in acute distress, conversant, hypophonic  EYES: No conjunctival or scleral injection, non-icteric;   ENMT: No external nasal lesions; MMM  NECK: Trachea midline without palpable neck mass; thyroid not enlarged and non-tender  RESPIRATORY: Breathing comfortably, speaking in full sentences, not using accessory mm to breathe, +cough.  CARDIOVASCULAR: +S1S2, RRR, no M/G/R; pedal pulses full and symmetric; no lower extremity edema  GASTROINTESTINAL: No palpable masses or tenderness, +BS throughout, no rebound/guarding; no hernia palpated  LYMPHATIC: No cervical LAD or tenderness  NEUROLOGIC: nonfocal, moves all extremities spontaneously,   PSYCHIATRIC: A+O x 3; mood and affect appropriate; appropriate insight and judgment    LABS:                        10.1   6.91  )-----------( 165      ( 31 Mar 2022 07:38 )             32.8     03-31    134<L>  |  96  |  15  ----------------------------<  286<H>  4.4   |  27  |  0.40<L>    Ca    8.0<L>      31 Mar 2022 07:40  Phos  2.2     03-31  Mg     1.6     03-31    TPro  5.5<L>  /  Alb  2.3<L>  /  TBili  0.3  /  DBili  x   /  AST  54<H>  /  ALT  64<H>  /  AlkPhos  85  03-31              Culture - Blood (collected 30 Mar 2022 21:55)  Source: .Blood Blood  Preliminary Report (31 Mar 2022 22:01):    No growth to date.    Culture - Blood (collected 30 Mar 2022 21:55)  Source: .Blood Blood  Gram Stain (31 Mar 2022 22:03):    Growth in aerobic bottle: Gram Positive Cocci in Clusters  Preliminary Report (31 Mar 2022 22:03):    Growth in aerobic bottle: Gram Positive Cocci in Clusters    ***Blood Panel PCR results on this specimen are available    approximately 3 hours after the Gram stain result.***    Gram stain, PCR, and/or culture results may not always    correspond due to difference in methodologies.    ************************************************************    This PCR assay was performed by multiplex PCR. This    Assay tests for 66 bacterial and resistance gene targets.    Please refer to the [facility] test directory    at [email or web address] for details.  Organism: Blood Culture PCR (01 Apr 2022 00:32)  Organism: Blood Culture PCR (01 Apr 2022 00:32)        RADIOLOGY & ADDITIONAL TESTS:  Results Reviewed:   Imaging Personally Reviewed:  Electrocardiogram Personally Reviewed:    COORDINATION OF CARE:  Care Discussed with Consultants/Other Providers [Y/N]:  Prior or Outpatient Records Reviewed [Y/N]:

## 2022-04-01 NOTE — PROGRESS NOTE ADULT - PROBLEM SELECTOR PLAN 1
Febrile, tachycardia, tachypnea, lactate 2.1. Associated AHRF, productive cough. Recent hospital stay with intubation for pneumonia at OSH and intubated, unclear which antibiotics were given and duration of treatment. Likely in setting of pneumonia and bronchiectasis exacerbation. History of sputum Cx +Pseudomonas (Feb 2020) via bronchoscopy. Also with UA grossly positive, however pt without symptoms  - S/p cefepime and vancomycin in ED, 500 cc bolus, MRSA+, RVP neg  - Cont cefepime for Pseudomonas coverage and vancomycin given recent intubation (3/29 - ), diflucan (3/30-4/6)  - BCx 3/30 with MRSA in 1/2 bottles  - UCx <50K candida glabrata  - Sputum Cx +mrsa  - ID consult, appreciate recs Febrile, tachycardia, tachypnea, lactate 2.1. Associated AHRF, productive cough. Recent hospital stay with intubation for pneumonia at OSH and intubated, unclear which antibiotics were given and duration of treatment. Likely in setting of pneumonia and bronchiectasis exacerbation. History of sputum Cx +Pseudomonas (Feb 2020) via bronchoscopy. Also with UA grossly positive, however pt without symptoms  - S/p cefepime and vancomycin in ED, 500 cc bolus, MRSA+, RVP neg  - Cont cefepime for Pseudomonas coverage and vancomycin given recent intubation (3/29 - ), diflucan (3/30-4/6)  - BCx 3/30 with MRSA in 1/2 bottles  - UCx <50K candida glabrata  - Sputum Cx +mrsa  - fungitell, aspergillus  - vanc trough, prior to every 4th dose  - ID consult, appreciate recs

## 2022-04-01 NOTE — PROGRESS NOTE ADULT - PROBLEM SELECTOR PLAN 9
started after extubation at [facility]  - ENT eval, appreciate assistance: laryngoscope at bedside with diffuse yellow patches of debris ?thrush  - diflucan 200 mg IV first dose, then diflucan 100 mg IV x6 days then ENT will rescope after treatment ()  - FEES scan: pureed diet and thin liquids, continued ST for dysphagia rehab started after extubation at [facility]  - ENT eval, appreciate assistance: laryngoscope at bedside with diffuse yellow patches of debris ?thrush  - diflucan 200 mg IV first dose, then diflucan 100 mg IV x6 days then ENT will rescope after treatment (), increased to 200 mg IV per ID recs  - FEES scan: pureed diet and thin liquids, continued ST for dysphagia rehab

## 2022-04-01 NOTE — PROGRESS NOTE ADULT - PROBLEM SELECTOR PLAN 10
- patient hypoglycemic to low 60s on 3/30, likely iso sepsis, adding premeal, along with decreasing methylpred to 4 mg  - s/p 0.5 A d50  - ctm FS with meals  - lantus and low dose sliding scale  - will hold premeals, ctm insulin requirement

## 2022-04-01 NOTE — PROGRESS NOTE ADULT - PROBLEM SELECTOR PLAN 6
Last A1c 7.8. Pt uses lantus 15 u qhs and Novolog varying dose depending on sugar, however was unable to elaborate on a usual dose of Novolog. Has been eating pureed diet due to weakness since last hospital stay  - Lantus 10 u qhs  - admelog 3 units  - moderate-dose ISS  - Monitor off prandial insulin until PO intake established Last A1c 7.8. Pt uses lantus 15 u qhs and Novolog varying dose depending on sugar, however was unable to elaborate on a usual dose of Novolog. Has been eating pureed diet due to weakness since last hospital stay  - Lantus 10 u qhs  - admelog 3 units  - low dose ISS

## 2022-04-01 NOTE — OCCUPATIONAL THERAPY INITIAL EVALUATION ADULT - PERTINENT HX OF CURRENT PROBLEM, REHAB EVAL
71 year old woman presenting with about 3 weeks of productive cough, bilous vomiting, shortness of breath and lethargy. Pt was originally admitted to [facility] on March 8th and treated for pna, given antibiotics, stay c/b intubation 2/2 hypoxia, and discharged on Friday March 25th with oxygen back to rehab (required 3-5L NC, no O2 requirement at baseline).

## 2022-04-02 DIAGNOSIS — Z93.3 COLOSTOMY STATUS: ICD-10-CM

## 2022-04-02 LAB
ALBUMIN SERPL ELPH-MCNC: 2.4 G/DL — LOW (ref 3.3–5)
ALP SERPL-CCNC: 78 U/L — SIGNIFICANT CHANGE UP (ref 40–120)
ALT FLD-CCNC: 56 U/L — HIGH (ref 10–45)
ANION GAP SERPL CALC-SCNC: 8 MMOL/L — SIGNIFICANT CHANGE UP (ref 5–17)
AST SERPL-CCNC: 40 U/L — SIGNIFICANT CHANGE UP (ref 10–40)
BILIRUB SERPL-MCNC: 0.2 MG/DL — SIGNIFICANT CHANGE UP (ref 0.2–1.2)
BUN SERPL-MCNC: 10 MG/DL — SIGNIFICANT CHANGE UP (ref 7–23)
CALCIUM SERPL-MCNC: 8.2 MG/DL — LOW (ref 8.4–10.5)
CHLORIDE SERPL-SCNC: 99 MMOL/L — SIGNIFICANT CHANGE UP (ref 96–108)
CO2 SERPL-SCNC: 32 MMOL/L — HIGH (ref 22–31)
CREAT SERPL-MCNC: 0.42 MG/DL — LOW (ref 0.5–1.3)
CULTURE RESULTS: SIGNIFICANT CHANGE UP
CULTURE RESULTS: SIGNIFICANT CHANGE UP
EGFR: 103 ML/MIN/1.73M2 — SIGNIFICANT CHANGE UP
FUNGITELL: <31 PG/ML — SIGNIFICANT CHANGE UP
GLUCOSE BLDC GLUCOMTR-MCNC: 222 MG/DL — HIGH (ref 70–99)
GLUCOSE BLDC GLUCOMTR-MCNC: 245 MG/DL — HIGH (ref 70–99)
GLUCOSE BLDC GLUCOMTR-MCNC: 268 MG/DL — HIGH (ref 70–99)
GLUCOSE BLDC GLUCOMTR-MCNC: 396 MG/DL — HIGH (ref 70–99)
GLUCOSE BLDC GLUCOMTR-MCNC: 91 MG/DL — SIGNIFICANT CHANGE UP (ref 70–99)
GLUCOSE SERPL-MCNC: 286 MG/DL — HIGH (ref 70–99)
HCT VFR BLD CALC: 29.7 % — LOW (ref 34.5–45)
HGB BLD-MCNC: 9 G/DL — LOW (ref 11.5–15.5)
MAGNESIUM SERPL-MCNC: 1.7 MG/DL — SIGNIFICANT CHANGE UP (ref 1.6–2.6)
MCHC RBC-ENTMCNC: 22.2 PG — LOW (ref 27–34)
MCHC RBC-ENTMCNC: 30.3 GM/DL — LOW (ref 32–36)
MCV RBC AUTO: 73.2 FL — LOW (ref 80–100)
NRBC # BLD: 0 /100 WBCS — SIGNIFICANT CHANGE UP (ref 0–0)
PHOSPHATE SERPL-MCNC: 1.8 MG/DL — LOW (ref 2.5–4.5)
PLATELET # BLD AUTO: 211 K/UL — SIGNIFICANT CHANGE UP (ref 150–400)
POTASSIUM SERPL-MCNC: 4 MMOL/L — SIGNIFICANT CHANGE UP (ref 3.5–5.3)
POTASSIUM SERPL-SCNC: 4 MMOL/L — SIGNIFICANT CHANGE UP (ref 3.5–5.3)
PROT SERPL-MCNC: 5.2 G/DL — LOW (ref 6–8.3)
RBC # BLD: 4.06 M/UL — SIGNIFICANT CHANGE UP (ref 3.8–5.2)
RBC # FLD: 18.4 % — HIGH (ref 10.3–14.5)
SODIUM SERPL-SCNC: 139 MMOL/L — SIGNIFICANT CHANGE UP (ref 135–145)
SPECIMEN SOURCE: SIGNIFICANT CHANGE UP
SPECIMEN SOURCE: SIGNIFICANT CHANGE UP
WBC # BLD: 4.87 K/UL — SIGNIFICANT CHANGE UP (ref 3.8–10.5)
WBC # FLD AUTO: 4.87 K/UL — SIGNIFICANT CHANGE UP (ref 3.8–10.5)

## 2022-04-02 PROCEDURE — 99232 SBSQ HOSP IP/OBS MODERATE 35: CPT

## 2022-04-02 PROCEDURE — 99233 SBSQ HOSP IP/OBS HIGH 50: CPT | Mod: GC

## 2022-04-02 RX ORDER — LACTULOSE 10 G/15ML
30 SOLUTION ORAL ONCE
Refills: 0 | Status: COMPLETED | OUTPATIENT
Start: 2022-04-02 | End: 2022-04-02

## 2022-04-02 RX ORDER — INSULIN LISPRO 100/ML
5 VIAL (ML) SUBCUTANEOUS
Refills: 0 | Status: DISCONTINUED | OUTPATIENT
Start: 2022-04-02 | End: 2022-04-03

## 2022-04-02 RX ORDER — INSULIN GLARGINE 100 [IU]/ML
14 INJECTION, SOLUTION SUBCUTANEOUS AT BEDTIME
Refills: 0 | Status: DISCONTINUED | OUTPATIENT
Start: 2022-04-02 | End: 2022-04-06

## 2022-04-02 RX ORDER — LACTULOSE 10 G/15ML
15 SOLUTION ORAL DAILY
Refills: 0 | Status: DISCONTINUED | OUTPATIENT
Start: 2022-04-03 | End: 2022-04-07

## 2022-04-02 RX ORDER — SODIUM CHLORIDE 9 MG/ML
1000 INJECTION, SOLUTION INTRAVENOUS
Refills: 0 | Status: DISCONTINUED | OUTPATIENT
Start: 2022-04-02 | End: 2022-04-07

## 2022-04-02 RX ORDER — DEXTROSE 50 % IN WATER 50 %
12.5 SYRINGE (ML) INTRAVENOUS ONCE
Refills: 0 | Status: DISCONTINUED | OUTPATIENT
Start: 2022-04-02 | End: 2022-04-07

## 2022-04-02 RX ORDER — POLYETHYLENE GLYCOL 3350 17 G/17G
17 POWDER, FOR SOLUTION ORAL
Refills: 0 | Status: DISCONTINUED | OUTPATIENT
Start: 2022-04-02 | End: 2022-04-07

## 2022-04-02 RX ORDER — GLUCAGON INJECTION, SOLUTION 0.5 MG/.1ML
1 INJECTION, SOLUTION SUBCUTANEOUS ONCE
Refills: 0 | Status: DISCONTINUED | OUTPATIENT
Start: 2022-04-02 | End: 2022-04-07

## 2022-04-02 RX ORDER — DEXTROSE 50 % IN WATER 50 %
25 SYRINGE (ML) INTRAVENOUS ONCE
Refills: 0 | Status: DISCONTINUED | OUTPATIENT
Start: 2022-04-02 | End: 2022-04-07

## 2022-04-02 RX ORDER — SODIUM,POTASSIUM PHOSPHATES 278-250MG
1 POWDER IN PACKET (EA) ORAL ONCE
Refills: 0 | Status: COMPLETED | OUTPATIENT
Start: 2022-04-02 | End: 2022-04-02

## 2022-04-02 RX ORDER — POTASSIUM PHOSPHATE, MONOBASIC POTASSIUM PHOSPHATE, DIBASIC 236; 224 MG/ML; MG/ML
15 INJECTION, SOLUTION INTRAVENOUS ONCE
Refills: 0 | Status: DISCONTINUED | OUTPATIENT
Start: 2022-04-02 | End: 2022-04-02

## 2022-04-02 RX ORDER — DEXTROSE 50 % IN WATER 50 %
15 SYRINGE (ML) INTRAVENOUS ONCE
Refills: 0 | Status: DISCONTINUED | OUTPATIENT
Start: 2022-04-02 | End: 2022-04-07

## 2022-04-02 RX ADMIN — CHLORHEXIDINE GLUCONATE 1 APPLICATION(S): 213 SOLUTION TOPICAL at 08:08

## 2022-04-02 RX ADMIN — Medication 3 MILLILITER(S): at 17:35

## 2022-04-02 RX ADMIN — MEXILETINE HYDROCHLORIDE 200 MILLIGRAM(S): 150 CAPSULE ORAL at 22:23

## 2022-04-02 RX ADMIN — Medication 2: at 08:17

## 2022-04-02 RX ADMIN — APIXABAN 5 MILLIGRAM(S): 2.5 TABLET, FILM COATED ORAL at 17:09

## 2022-04-02 RX ADMIN — Medication 1 TABLET(S): at 11:24

## 2022-04-02 RX ADMIN — Medication 3 UNIT(S): at 08:17

## 2022-04-02 RX ADMIN — Medication 3 MILLILITER(S): at 11:24

## 2022-04-02 RX ADMIN — FLUCONAZOLE 100 MILLIGRAM(S): 150 TABLET ORAL at 06:01

## 2022-04-02 RX ADMIN — SODIUM CHLORIDE 4 MILLILITER(S): 9 INJECTION INTRAMUSCULAR; INTRAVENOUS; SUBCUTANEOUS at 17:35

## 2022-04-02 RX ADMIN — INSULIN GLARGINE 14 UNIT(S): 100 INJECTION, SOLUTION SUBCUTANEOUS at 23:50

## 2022-04-02 RX ADMIN — Medication 1 PACKET(S): at 08:17

## 2022-04-02 RX ADMIN — MUPIROCIN 1 APPLICATION(S): 20 OINTMENT TOPICAL at 06:03

## 2022-04-02 RX ADMIN — Medication 3: at 16:54

## 2022-04-02 RX ADMIN — CEFEPIME 100 MILLIGRAM(S): 1 INJECTION, POWDER, FOR SOLUTION INTRAMUSCULAR; INTRAVENOUS at 17:09

## 2022-04-02 RX ADMIN — Medication 3 MILLILITER(S): at 06:02

## 2022-04-02 RX ADMIN — Medication 5 UNIT(S): at 16:54

## 2022-04-02 RX ADMIN — Medication 150 MILLIGRAM(S): at 17:34

## 2022-04-02 RX ADMIN — LACTULOSE 30 GRAM(S): 10 SOLUTION ORAL at 10:53

## 2022-04-02 RX ADMIN — SENNA PLUS 2 TABLET(S): 8.6 TABLET ORAL at 22:24

## 2022-04-02 RX ADMIN — CEFEPIME 100 MILLIGRAM(S): 1 INJECTION, POWDER, FOR SOLUTION INTRAMUSCULAR; INTRAVENOUS at 08:17

## 2022-04-02 RX ADMIN — Medication 500 MILLIGRAM(S): at 11:24

## 2022-04-02 RX ADMIN — Medication 3 UNIT(S): at 12:09

## 2022-04-02 RX ADMIN — Medication 8 MILLIGRAM(S): at 06:02

## 2022-04-02 RX ADMIN — Medication 250 MILLIGRAM(S): at 11:24

## 2022-04-02 RX ADMIN — ZINC SULFATE TAB 220 MG (50 MG ZINC EQUIVALENT) 220 MILLIGRAM(S): 220 (50 ZN) TAB at 11:24

## 2022-04-02 RX ADMIN — SODIUM CHLORIDE 4 MILLILITER(S): 9 INJECTION INTRAMUSCULAR; INTRAVENOUS; SUBCUTANEOUS at 06:02

## 2022-04-02 RX ADMIN — APIXABAN 5 MILLIGRAM(S): 2.5 TABLET, FILM COATED ORAL at 06:02

## 2022-04-02 RX ADMIN — MEXILETINE HYDROCHLORIDE 200 MILLIGRAM(S): 150 CAPSULE ORAL at 13:00

## 2022-04-02 RX ADMIN — CEFEPIME 100 MILLIGRAM(S): 1 INJECTION, POWDER, FOR SOLUTION INTRAMUSCULAR; INTRAVENOUS at 23:40

## 2022-04-02 RX ADMIN — MEXILETINE HYDROCHLORIDE 200 MILLIGRAM(S): 150 CAPSULE ORAL at 06:01

## 2022-04-02 RX ADMIN — MUPIROCIN 1 APPLICATION(S): 20 OINTMENT TOPICAL at 17:09

## 2022-04-02 RX ADMIN — Medication 150 MILLIGRAM(S): at 06:18

## 2022-04-02 RX ADMIN — Medication 250 MILLIGRAM(S): at 00:27

## 2022-04-02 RX ADMIN — ATORVASTATIN CALCIUM 20 MILLIGRAM(S): 80 TABLET, FILM COATED ORAL at 22:24

## 2022-04-02 RX ADMIN — PANTOPRAZOLE SODIUM 40 MILLIGRAM(S): 20 TABLET, DELAYED RELEASE ORAL at 06:02

## 2022-04-02 RX ADMIN — Medication 3 MILLILITER(S): at 23:40

## 2022-04-02 RX ADMIN — CEFEPIME 100 MILLIGRAM(S): 1 INJECTION, POWDER, FOR SOLUTION INTRAMUSCULAR; INTRAVENOUS at 01:36

## 2022-04-02 RX ADMIN — Medication 5: at 12:09

## 2022-04-02 RX ADMIN — POLYETHYLENE GLYCOL 3350 17 GRAM(S): 17 POWDER, FOR SOLUTION ORAL at 17:34

## 2022-04-02 NOTE — PROGRESS NOTE ADULT - SUBJECTIVE AND OBJECTIVE BOX
71y old  Female who presents with a chief complaint of sepsis (02 Apr 2022 07:15)      Interval history:  Afebrile, feeling better, coughing less, no diarrhea.       Allergies:   aspirin (Short breath)  Avelox (Short breath; Pruritus)  codeine (Short breath)  Dilaudid (Short breath)  iodine (Short breath; Swelling)  shellfish (Anaphylaxis)  tetanus toxoid (Short breath)  Valium (Short breath)      Antimicrobials:  cefepime   IVPB 2000 milliGRAM(s) IV Intermittent every 8 hours  fluconAZOLE IVPB 200 milliGRAM(s) IV Intermittent every 24 hours  vancomycin  IVPB 1000 milliGRAM(s) IV Intermittent every 12 hours      REVIEW OF SYSTEMS:  No chest pain   some discomfort around ostomy   No dysuria   No rash.       Vital Signs Last 24 Hrs  T(C): 36.7 (04-02-22 @ 05:30), Max: 36.9 (04-01-22 @ 21:45)  T(F): 98 (04-02-22 @ 05:30), Max: 98.4 (04-01-22 @ 21:45)  HR: 82 (04-02-22 @ 05:30) (78 - 88)  BP: 127/77 (04-02-22 @ 05:30) (119/66 - 134/76)  BP(mean): --  RR: 16 (04-02-22 @ 05:30) (16 - 19)  SpO2: 98% (04-02-22 @ 05:30) (97% - 99%)      PHYSICAL EXAM:  Patient in no acute distress. Alert, awake   Cardiovascular: S1S2 normal.  Lungs: + air entry B/L lung fields.  Gastrointestinal: soft, nondistended. + ostomy mild discomfort around ostomy   Extremities: no edema.  IV sites not inflamed.                             9.0    4.87  )-----------( 211      ( 02 Apr 2022 05:34 )             29.7   04-02    139  |  99  |  10  ----------------------------<  286<H>  4.0   |  32<H>  |  0.42<L>    Ca    8.2<L>      02 Apr 2022 05:34  Phos  1.8     04-02  Mg     1.7     04-02    TPro  5.2<L>  /  Alb  2.4<L>  /  TBili  0.2  /  DBili  x   /  AST  40  /  ALT  56<H>  /  AlkPhos  78  04-02      LIVER FUNCTIONS - ( 02 Apr 2022 05:34 )  Alb: 2.4 g/dL / Pro: 5.2 g/dL / ALK PHOS: 78 U/L / ALT: 56 U/L / AST: 40 U/L / GGT: x               Culture - Blood (collected 30 Mar 2022 21:55)  Source: .Blood Blood  Preliminary Report (31 Mar 2022 22:01):    No growth to date.    Culture - Blood (collected 30 Mar 2022 21:55)  Source: .Blood Blood  Gram Stain (31 Mar 2022 22:03):    Growth in aerobic bottle: Gram Positive Cocci in Clusters  Final Report (01 Apr 2022 18:56):    Growth in aerobic bottle: Staphylococcus epidermidis    Coag Negative Staphylococcus    Single set isolate, possible contaminant. Contact    Microbiology if susceptibility testing clinically    indicated.    ***Blood Panel PCR results on this specimen are available    approximately 3 hours after the Gram stain result.***    Gram stain, PCR, and/or culture results may not always    correspond due to difference in methodologies.    ************************************************************    This PCR assay was performed by multiplex PCR. This    Assay tests for 66 bacterial and resistance gene targets.    Please refer to the [facility] test directory    at [email or web address] for details.  Organism: Blood Culture PCR (01 Apr 2022 18:56)  Organism: Blood Culture PCR (01 Apr 2022 18:56)

## 2022-04-02 NOTE — PROGRESS NOTE ADULT - PROBLEM SELECTOR PLAN 1
Febrile, tachycardia, tachypnea, lactate 2.1. Associated AHRF, productive cough. Recent hospital stay with intubation for pneumonia at OSH and intubated, unclear which antibiotics were given and duration of treatment. Likely in setting of pneumonia and bronchiectasis exacerbation. History of sputum Cx +Pseudomonas (Feb 2020) via bronchoscopy. Also with UA grossly positive, however pt without symptoms  - S/p cefepime and vancomycin in ED, 500 cc bolus, MRSA+, RVP neg  - Cont cefepime for Pseudomonas coverage and vancomycin given recent intubation (3/29 - ), diflucan (3/30-4/6)  - BCx 3/30 with MRSE in 1/2 bottles, likely contaminant  - UCx <50K candida glabrata  - Sputum Cx +mrsa  - fungitell, aspergillus abs, galactomannan  - bcx repeat until cleared  - vanc trough, prior to every 4th dose  - ID consult, appreciate recs - patient is s/p colostomy after colon cancer s/p total colectomy. now with decreased colostomy output on 4/2.  - reportedly with constipation after holding home lactulose, per pt report. colostomy appears healthy, without any sx of infection at colostomy site, however with slight TTP at LLQ.   - c/w home lactulose  - c/w miralax and senna  - serial abdominal exam  - can consider GI series if without resolution

## 2022-04-02 NOTE — PROGRESS NOTE ADULT - SUBJECTIVE AND OBJECTIVE BOX
PROGRESS NOTE:   Authored by Raissa Nguyen MD  Internal Medicine      Patient is a 71y old  Female who presents with a chief complaint of sepsis (01 Apr 2022 06:50)      SUBJECTIVE / OVERNIGHT EVENTS: NAEO, patient seen and examined at bedside. per bedside nurse, patient has not been having colostomy output for the past 48 hours, patient asymptomatic    MEDICATIONS  (STANDING):  albuterol/ipratropium for Nebulization 3 milliLiter(s) Nebulizer every 6 hours  apixaban 5 milliGRAM(s) Oral every 12 hours  ascorbic acid 500 milliGRAM(s) Oral daily  atorvastatin 20 milliGRAM(s) Oral at bedtime  cefepime   IVPB 2000 milliGRAM(s) IV Intermittent every 8 hours  chlorhexidine 2% Cloths 1 Application(s) Topical <User Schedule>  dextrose 5%. 1000 milliLiter(s) (50 mL/Hr) IV Continuous <Continuous>  dextrose 5%. 1000 milliLiter(s) (100 mL/Hr) IV Continuous <Continuous>  dextrose 50% Injectable 25 Gram(s) IV Push once  dextrose 50% Injectable 12.5 Gram(s) IV Push once  dextrose 50% Injectable 25 Gram(s) IV Push once  fluconAZOLE IVPB 200 milliGRAM(s) IV Intermittent every 24 hours  glucagon  Injectable 1 milliGRAM(s) IntraMuscular once  influenza  Vaccine (HIGH DOSE) 0.7 milliLiter(s) IntraMuscular once  insulin glargine Injectable (LANTUS) 10 Unit(s) SubCutaneous at bedtime  insulin lispro (ADMELOG) corrective regimen sliding scale   SubCutaneous three times a day before meals  insulin lispro (ADMELOG) corrective regimen sliding scale   SubCutaneous at bedtime  insulin lispro Injectable (ADMELOG) 3 Unit(s) SubCutaneous three times a day before meals  methylPREDNISolone 8 milliGRAM(s) Oral daily  mexiletine 200 milliGRAM(s) Oral three times a day  multivitamin 1 Tablet(s) Oral daily  mupirocin 2% Ointment 1 Application(s) Both Nostrils two times a day  pantoprazole    Tablet 40 milliGRAM(s) Oral before breakfast  polyethylene glycol 3350 17 Gram(s) Oral daily  potassium phosphate / sodium phosphate Powder (PHOS-NaK) 1 Packet(s) Oral once  pregabalin 150 milliGRAM(s) Oral two times a day  senna 2 Tablet(s) Oral at bedtime  sodium chloride 7% Inhalation 4 milliLiter(s) Inhalation every 12 hours  vancomycin  IVPB 1000 milliGRAM(s) IV Intermittent every 12 hours  zinc sulfate 220 milliGRAM(s) Oral daily    MEDICATIONS  (PRN):  acetaminophen     Tablet .. 975 milliGRAM(s) Oral every 6 hours PRN Temp greater or equal to 38C (100.4F), Mild Pain (1 - 3)  dextrose Oral Gel 15 Gram(s) Oral once PRN Blood Glucose LESS THAN 70 milliGRAM(s)/deciliter  ondansetron Injectable 4 milliGRAM(s) IV Push every 8 hours PRN Nausea and/or Vomiting      CAPILLARY BLOOD GLUCOSE      POCT Blood Glucose.: 273 mg/dL (01 Apr 2022 21:36)  POCT Blood Glucose.: 295 mg/dL (01 Apr 2022 17:50)  POCT Blood Glucose.: 277 mg/dL (01 Apr 2022 12:07)  POCT Blood Glucose.: 220 mg/dL (01 Apr 2022 08:33)    I&O's Summary    01 Apr 2022 07:01  -  02 Apr 2022 07:00  --------------------------------------------------------  IN: 830 mL / OUT: 350 mL / NET: 480 mL        PHYSICAL EXAM:  Vital Signs Last 24 Hrs  T(C): 36.7 (02 Apr 2022 05:30), Max: 36.9 (01 Apr 2022 21:45)  T(F): 98 (02 Apr 2022 05:30), Max: 98.4 (01 Apr 2022 21:45)  HR: 82 (02 Apr 2022 05:30) (78 - 90)  BP: 127/77 (02 Apr 2022 05:30) (119/66 - 134/76)  BP(mean): --  RR: 16 (02 Apr 2022 05:30) (16 - 19)  SpO2: 98% (02 Apr 2022 05:30) (95% - 99%)  CONSTITUTIONAL: Well-groomed, in no apparent distress  EYES: No conjunctival or scleral injection, non-icteric;   ENMT: No external nasal lesions; MMM  NECK: Trachea midline without palpable neck mass; thyroid not enlarged and non-tender  RESPIRATORY: Breathing comfortably; no dullness to percussion; lungs CTA without wheeze/rhonchi/rales  CARDIOVASCULAR: +S1S2, RRR, no M/G/R; pedal pulses full and symmetric; no lower extremity edema  GASTROINTESTINAL: No palpable masses or tenderness, +BS throughout, no rebound/guarding; no hepatosplenomegaly; no hernia palpated  LYMPHATIC: No cervical LAD or tenderness  SKIN: No rashes or ulcers noted  NEUROLOGIC: CN II-XII intact; sensation intact in LEs b/l to light touch  PSYCHIATRIC: A+O x 3; mood and affect appropriate; appropriate insight and judgment    LABS:                        9.0    4.87  )-----------( 211      ( 02 Apr 2022 05:34 )             29.7     04-02    139  |  99  |  10  ----------------------------<  286<H>  4.0   |  32<H>  |  0.42<L>    Ca    8.2<L>      02 Apr 2022 05:34  Phos  1.8     04-02  Mg     1.7     04-02    TPro  5.2<L>  /  Alb  2.4<L>  /  TBili  0.2  /  DBili  x   /  AST  40  /  ALT  56<H>  /  AlkPhos  78  04-02              Culture - Blood (collected 30 Mar 2022 21:55)  Source: .Blood Blood  Preliminary Report (31 Mar 2022 22:01):    No growth to date.    Culture - Blood (collected 30 Mar 2022 21:55)  Source: .Blood Blood  Gram Stain (31 Mar 2022 22:03):    Growth in aerobic bottle: Gram Positive Cocci in Clusters  Final Report (01 Apr 2022 18:56):    Growth in aerobic bottle: Staphylococcus epidermidis    Coag Negative Staphylococcus    Single set isolate, possible contaminant. Contact    Microbiology if susceptibility testing clinically    indicated.    ***Blood Panel PCR results on this specimen are available    approximately 3 hours after the Gram stain result.***    Gram stain, PCR, and/or culture results may not always    correspond due to difference in methodologies.    ************************************************************    This PCR assay was performed by multiplex PCR. This    Assay tests for 66 bacterial and resistance gene targets.    Please refer to the [facility] test directory    at [email or web address] for details.  Organism: Blood Culture PCR (01 Apr 2022 18:56)  Organism: Blood Culture PCR (01 Apr 2022 18:56)        RADIOLOGY & ADDITIONAL TESTS:  Results Reviewed:   Imaging Personally Reviewed:  Electrocardiogram Personally Reviewed:    COORDINATION OF CARE:  Care Discussed with Consultants/Other Providers [Y/N]:  Prior or Outpatient Records Reviewed [Y/N]:   PROGRESS NOTE:   Authored by Raissa Nguyen MD  Internal Medicine      Patient is a 71y old  Female who presents with a chief complaint of sepsis (01 Apr 2022 06:50)      SUBJECTIVE / OVERNIGHT EVENTS: NAEO, patient seen and examined at bedside. per bedside nurse, patient has not been having colostomy output for the past 48 hours, patient asymptomatic. reports cough is improved, less productive purulent sputum production and no hemoptysis.   appetite improved after getting another menu    MEDICATIONS  (STANDING):  albuterol/ipratropium for Nebulization 3 milliLiter(s) Nebulizer every 6 hours  apixaban 5 milliGRAM(s) Oral every 12 hours  ascorbic acid 500 milliGRAM(s) Oral daily  atorvastatin 20 milliGRAM(s) Oral at bedtime  cefepime   IVPB 2000 milliGRAM(s) IV Intermittent every 8 hours  chlorhexidine 2% Cloths 1 Application(s) Topical <User Schedule>  dextrose 5%. 1000 milliLiter(s) (50 mL/Hr) IV Continuous <Continuous>  dextrose 5%. 1000 milliLiter(s) (100 mL/Hr) IV Continuous <Continuous>  dextrose 50% Injectable 25 Gram(s) IV Push once  dextrose 50% Injectable 12.5 Gram(s) IV Push once  dextrose 50% Injectable 25 Gram(s) IV Push once  fluconAZOLE IVPB 200 milliGRAM(s) IV Intermittent every 24 hours  glucagon  Injectable 1 milliGRAM(s) IntraMuscular once  influenza  Vaccine (HIGH DOSE) 0.7 milliLiter(s) IntraMuscular once  insulin glargine Injectable (LANTUS) 10 Unit(s) SubCutaneous at bedtime  insulin lispro (ADMELOG) corrective regimen sliding scale   SubCutaneous three times a day before meals  insulin lispro (ADMELOG) corrective regimen sliding scale   SubCutaneous at bedtime  insulin lispro Injectable (ADMELOG) 3 Unit(s) SubCutaneous three times a day before meals  methylPREDNISolone 8 milliGRAM(s) Oral daily  mexiletine 200 milliGRAM(s) Oral three times a day  multivitamin 1 Tablet(s) Oral daily  mupirocin 2% Ointment 1 Application(s) Both Nostrils two times a day  pantoprazole    Tablet 40 milliGRAM(s) Oral before breakfast  polyethylene glycol 3350 17 Gram(s) Oral daily  potassium phosphate / sodium phosphate Powder (PHOS-NaK) 1 Packet(s) Oral once  pregabalin 150 milliGRAM(s) Oral two times a day  senna 2 Tablet(s) Oral at bedtime  sodium chloride 7% Inhalation 4 milliLiter(s) Inhalation every 12 hours  vancomycin  IVPB 1000 milliGRAM(s) IV Intermittent every 12 hours  zinc sulfate 220 milliGRAM(s) Oral daily    MEDICATIONS  (PRN):  acetaminophen     Tablet .. 975 milliGRAM(s) Oral every 6 hours PRN Temp greater or equal to 38C (100.4F), Mild Pain (1 - 3)  dextrose Oral Gel 15 Gram(s) Oral once PRN Blood Glucose LESS THAN 70 milliGRAM(s)/deciliter  ondansetron Injectable 4 milliGRAM(s) IV Push every 8 hours PRN Nausea and/or Vomiting      CAPILLARY BLOOD GLUCOSE      POCT Blood Glucose.: 273 mg/dL (01 Apr 2022 21:36)  POCT Blood Glucose.: 295 mg/dL (01 Apr 2022 17:50)  POCT Blood Glucose.: 277 mg/dL (01 Apr 2022 12:07)  POCT Blood Glucose.: 220 mg/dL (01 Apr 2022 08:33)    I&O's Summary    01 Apr 2022 07:01  -  02 Apr 2022 07:00  --------------------------------------------------------  IN: 830 mL / OUT: 350 mL / NET: 480 mL        PHYSICAL EXAM:  Vital Signs Last 24 Hrs  T(C): 36.7 (02 Apr 2022 05:30), Max: 36.9 (01 Apr 2022 21:45)  T(F): 98 (02 Apr 2022 05:30), Max: 98.4 (01 Apr 2022 21:45)  HR: 82 (02 Apr 2022 05:30) (78 - 90)  BP: 127/77 (02 Apr 2022 05:30) (119/66 - 134/76)  BP(mean): --  RR: 16 (02 Apr 2022 05:30) (16 - 19)  SpO2: 98% (02 Apr 2022 05:30) (95% - 99%)  CONSTITUTIONAL: alert, comfortably laying in bed, not in acute distress, conversant, hypophonic  EYES: No conjunctival or scleral injection, non-icteric;   ENMT: No external nasal lesions; MMM  NECK: Trachea midline without palpable neck mass; thyroid not enlarged and non-tender  RESPIRATORY: Breathing comfortably, speaking in full sentences, not using accessory mm to breathe, +cough.  CARDIOVASCULAR: +S1S2, RRR, no M/G/R; pedal pulses full and symmetric; no lower extremity edema  GASTROINTESTINAL: No palpable masses or mildly ttp in llq, 4 cm buldge in RLQ which is not tender, +BS throughout, no rebound/guarding; no hernia palpated, colostomy in place, pink without erythema.   LYMPHATIC: No cervical LAD or tenderness  NEUROLOGIC: nonfocal, moves all extremities spontaneously,   PSYCHIATRIC: A+O x 3; mood and affect appropriate; appropriate insight and judgment     LABS:                        9.0    4.87  )-----------( 211      ( 02 Apr 2022 05:34 )             29.7     04-02    139  |  99  |  10  ----------------------------<  286<H>  4.0   |  32<H>  |  0.42<L>    Ca    8.2<L>      02 Apr 2022 05:34  Phos  1.8     04-02  Mg     1.7     04-02    TPro  5.2<L>  /  Alb  2.4<L>  /  TBili  0.2  /  DBili  x   /  AST  40  /  ALT  56<H>  /  AlkPhos  78  04-02              Culture - Blood (collected 30 Mar 2022 21:55)  Source: .Blood Blood  Preliminary Report (31 Mar 2022 22:01):    No growth to date.    Culture - Blood (collected 30 Mar 2022 21:55)  Source: .Blood Blood  Gram Stain (31 Mar 2022 22:03):    Growth in aerobic bottle: Gram Positive Cocci in Clusters  Final Report (01 Apr 2022 18:56):    Growth in aerobic bottle: Staphylococcus epidermidis    Coag Negative Staphylococcus    Single set isolate, possible contaminant. Contact    Microbiology if susceptibility testing clinically    indicated.    ***Blood Panel PCR results on this specimen are available    approximately 3 hours after the Gram stain result.***    Gram stain, PCR, and/or culture results may not always    correspond due to difference in methodologies.    ************************************************************    This PCR assay was performed by multiplex PCR. This    Assay tests for 66 bacterial and resistance gene targets.    Please refer to the [facility] test directory    at [email or web address] for details.  Organism: Blood Culture PCR (01 Apr 2022 18:56)  Organism: Blood Culture PCR (01 Apr 2022 18:56)        RADIOLOGY & ADDITIONAL TESTS:  Results Reviewed:   Imaging Personally Reviewed:  Electrocardiogram Personally Reviewed:    COORDINATION OF CARE:  Care Discussed with Consultants/Other Providers [Y/N]:  Prior or Outpatient Records Reviewed [Y/N]:

## 2022-04-02 NOTE — PROGRESS NOTE ADULT - PROBLEM SELECTOR PLAN 5
Not hypotensive, however low threshold for stress dose steroids if hypotensive given severe sepsis  - Cont home steroids Likely exacerbation in setting of sepsis. S/p tracheoplasty with residual frequent mucus production  - Patient's daughter called Dr. Allison to make him aware of admission, will see patient in AM  - Cont IV antibiotics with Pseudomonal coverage for likely acute exacerbation  - Cont home steroids for severe persistent asthma and adrenal insufficiency  - Pt uses nebulizer treatment at home, duonebs q6h standing. ensure patient is sitting upright for all inhaled meds due to hx of tracheobronchomalacia  - Cont Spiriva  - Incentive spirometry

## 2022-04-02 NOTE — PROVIDER CONTACT NOTE (OTHER) - ASSESSMENT
pt A&Ox4, VSS. pt endorses that at times she wont have any output for days. Pt reports having colostomy placed in 2013.
pt A&Ox4, VSS. pt is asymptomatic. + active bowel sounds in all 4 quadrants. Pt denies tenderness and pain the abdomen. 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
Pt in bed asymptomatic

## 2022-04-02 NOTE — PROVIDER CONTACT NOTE (OTHER) - RECOMMENDATIONS
BC x2 ordered UA  & CX ordered Antibiobic in progress.  tylenol given.
MD aware. continuing with bowel regimen.
MD aware. no interventions at this time. continue to monitor.

## 2022-04-02 NOTE — PROGRESS NOTE ADULT - PROBLEM SELECTOR PLAN 6
Last A1c 7.8. Pt uses lantus 15 u qhs and Novolog varying dose depending on sugar, however was unable to elaborate on a usual dose of Novolog. Has been eating pureed diet due to weakness since last hospital stay  - Lantus 10 u qhs  - admelog 3 units  - low dose ISS Not hypotensive, however low threshold for stress dose steroids if hypotensive given severe sepsis  - Cont home steroids

## 2022-04-02 NOTE — PROGRESS NOTE ADULT - PROBLEM SELECTOR PLAN 9
started after extubation at [facility]  - ENT eval, appreciate assistance: laryngoscope at bedside with diffuse yellow patches of debris ?thrush  - diflucan 200 mg IV first dose, then diflucan 100 mg IV x6 days then ENT will rescope after treatment (), increased to 200 mg IV per ID recs  - FEES scan: pureed diet and thin liquids, continued ST for dysphagia rehab 2018, s/p total colectomy and chemotherapy. Last CT Feb 2022 without evidence of metastatic disease or pathologic adenopathy  - CTM

## 2022-04-02 NOTE — PROVIDER CONTACT NOTE (OTHER) - SITUATION
Pt has a temp of 102.9
RLQ bulge spreading to lateral RUQ abdomen
zero output from colostomy bag within past 24 hours.

## 2022-04-02 NOTE — PROGRESS NOTE ADULT - ASSESSMENT
70 yo F with history of tracheobronchomalasia s/p tracheobronchoplasty, bronchiectasis, severe persistent asthma (steroid dependent), adrenal insuffiencey on chronic steroids, DM2, HTN, colorectal cancer s/p total colectomy now with colostomy, with 3 weeks of productive cough, bilous vomiting, shortness of breath and lethargy.   At OSH March 8th and treated for pna, given antibiotics, stay c/b intubation 2/2 hypoxia, and discharged on Friday March 25th   At rehab, SOB, cough, fever, then presents to ED for further care  Note presence of mediport  UA+, yeast cells  RVP neg  Possible thrush seen on laryngoscope--given Fluconazole  3/31 pt with wound care consult: Skin:  Sacral/bilateral buttocks with central deep maroon discoloration and peripheral superficially denuded skin with skin bridges L 5cm X W 5cm xD 0.1cm with pink wound bed, no necrotic tissue, and scant serosanguinous drainage, +TTP  Ongoing fevers  BCX with CoNS 1/4  UA+, with low CFU glabrata  3/29 Sputum CX MRSA  CT with area of consolidation in setting bronchiectasis  Uncertain cause fever--BCX is likely contam; chest imaging possible PNA with MRSA in sputum? Lower suspicion for fungal UTI, but note presence of low CFU glabrata  Continue empiric coverage for now      Overall, Fever, positive culture finding, bandemia, elevated LFTs  concern for pneumonia, MRSA infection     PLAN;   - Vanco 1g q 12  - monitor vancomycin trough and creatinine to avoid nephrotoxicity and ensure efficacy   - c/w Cefepime 2g q 8 for now but if cx stay negative, no obvious GNR infection, can stop cefepime   - Fluconazole 200mg q 24  - F/U fungal studies--fungitell negative, aspergillus pending   - Wound care to sacral wound  - If fevers not improved, check CT A/P  - F/U repeat BCXs (I suspect CoNS is contam)      Kourtney Gordon-Shanelle  Please contact through MS Teams   If no response or past 5 pm/weekend call [phone].

## 2022-04-02 NOTE — PROGRESS NOTE ADULT - PROBLEM SELECTOR PLAN 11
DVT: Eliquis  Diet: pureed, thin liquids, consistent carb diet, glucerna shake 3/day, zinc, MVI, and vitamin C.  speech/swallow eval passed, nutrition consult, appreciate recs  PT: MADISON  LDA: colostomy, PIV  Code: Full code - patient hypoglycemic to low 60s on 3/30, likely iso sepsis, adding premeal, along with decreasing methylpred to 4 mg  - s/p 0.5 A d50  - ctm FS with meals  - lantus and low dose sliding scale  - will hold premeals, ctm insulin requirement

## 2022-04-02 NOTE — PROGRESS NOTE ADULT - PROBLEM SELECTOR PLAN 2
Multifactorial in setting of pneumonia, bronchiectasis exacerbation, likely asthma exacerbation. PE on differential however she has been on therapeutic AC for afib, patient has contrast to allergy with SOB as reaction. Unclear history of COPD, states her sister smokes and she has secondhand exposure, however COPD diagnosis was never fully established however she does have known severe persistent asthma. Discharged to rehab on O2, unclear how much but not on O2 usually at home. +MRSA pneumonia  - CT chest 3/29: worsening extensive bronchial impaction and tree in bud nodularity with new right lower lobe consolidation iso bronchiectasis. diffuse bronchial mucoid impaction  - Wean O2 as tolerated  - c/w methylprednisolone 8 mg qd   - aggressive pulmonary toilet hygiene: chest vest, acapella, incentive spirometry, duonebs q6, hypertonic sal q12  - pulmonary Dr. Allison following Febrile, tachycardia, tachypnea, lactate 2.1. Associated AHRF, productive cough. Recent hospital stay with intubation for pneumonia at OSH and intubated, unclear which antibiotics were given and duration of treatment. Likely in setting of pneumonia and bronchiectasis exacerbation. History of sputum Cx +Pseudomonas (Feb 2020) via bronchoscopy. Also with UA grossly positive, however pt without symptoms  - S/p cefepime and vancomycin in ED, 500 cc bolus, MRSA+, RVP neg  - Cont cefepime for Pseudomonas coverage and vancomycin given recent intubation (3/29 - ), diflucan (3/30-4/6)  - BCx 3/30 with MRSE in 1/2 bottles, likely contaminant  - UCx <50K candida glabrata  - Sputum Cx +mrsa  - fungitell, aspergillus abs, galactomannan  - bcx repeat until cleared  - vanc trough, prior to every 4th dose  - ID consult, appreciate recs

## 2022-04-02 NOTE — PROGRESS NOTE ADULT - PROBLEM SELECTOR PLAN 8
2018, s/p total colectomy and chemotherapy. Last CT Feb 2022 without evidence of metastatic disease or pathologic adenopathy  - CTM EKG showing sinus tachycardia  - Cont Eliquis 5 mg BID  - Cont home mexiletine 200 mg TID

## 2022-04-02 NOTE — PROGRESS NOTE ADULT - PROBLEM SELECTOR PLAN 3
Multiple episodes of vomiting. Pt reported constipation at rehab and was on lactulose with resolution of constipation. Differential includes C diff given recent Abx use and hospital stay however abdominal exam benign. Possibly in setting of gastroparesis  - Hold C diff testing unless abdominal exam becomes concerning or persistent diarrhea  - PPI 40 mg PO daily Multifactorial in setting of pneumonia, bronchiectasis exacerbation, likely asthma exacerbation. PE on differential however she has been on therapeutic AC for afib, patient has contrast to allergy with SOB as reaction. Unclear history of COPD, states her sister smokes and she has secondhand exposure, however COPD diagnosis was never fully established however she does have known severe persistent asthma. Discharged to rehab on O2, unclear how much but not on O2 usually at home. +MRSA pneumonia  - CT chest 3/29: worsening extensive bronchial impaction and tree in bud nodularity with new right lower lobe consolidation iso bronchiectasis. diffuse bronchial mucoid impaction  - Wean O2 as tolerated  - c/w methylprednisolone 8 mg qd   - aggressive pulmonary toilet hygiene: chest vest, acapella, incentive spirometry, duonebs q6, hypertonic sal q12  - pulmonary Dr. Allison following

## 2022-04-02 NOTE — PROVIDER CONTACT NOTE (OTHER) - BACKGROUND
Pt admitted with Sepsis, possible PNA .  Pt is on Cefepineand Vanco
pt admitted for sepsis
pt admitted sepsis

## 2022-04-02 NOTE — PROGRESS NOTE ADULT - PROBLEM SELECTOR PLAN 10
- patient hypoglycemic to low 60s on 3/30, likely iso sepsis, adding premeal, along with decreasing methylpred to 4 mg  - s/p 0.5 A d50  - ctm FS with meals  - lantus and low dose sliding scale  - will hold premeals, ctm insulin requirement started after extubation at [facility]  - ENT eval, appreciate assistance: laryngoscope at bedside with diffuse yellow patches of debris ?thrush  - diflucan 200 mg IV first dose, then diflucan 100 mg IV x6 days then ENT will rescope after treatment (), increased to 200 mg IV per ID recs  - FEES scan: pureed diet and thin liquids, continued ST for dysphagia rehab

## 2022-04-02 NOTE — PROGRESS NOTE ADULT - PROBLEM SELECTOR PLAN 7
EKG showing sinus tachycardia  - Cont Eliquis 5 mg BID  - Cont home mexiletine 200 mg TID Last A1c 7.8. Pt uses lantus 15 u qhs and Novolog varying dose depending on sugar, however was unable to elaborate on a usual dose of Novolog. Has been eating pureed diet due to weakness since last hospital stay  - Lantus 10 u qhs  - admelog 3 units  - low dose ISS

## 2022-04-02 NOTE — PROGRESS NOTE ADULT - PROBLEM SELECTOR PLAN 4
Likely exacerbation in setting of sepsis. S/p tracheoplasty with residual frequent mucus production  - Patient's daughter called Dr. Allison to make him aware of admission, will see patient in AM  - Cont IV antibiotics with Pseudomonal coverage for likely acute exacerbation  - Cont home steroids for severe persistent asthma and adrenal insufficiency  - Pt uses nebulizer treatment at home, duonebs q6h standing. ensure patient is sitting upright for all inhaled meds due to hx of tracheobronchomalacia  - Cont Spiriva  - Incentive spirometry Multiple episodes of vomiting. Pt reported constipation at rehab and was on lactulose with resolution of constipation. Differential includes C diff given recent Abx use and hospital stay however abdominal exam benign. Possibly in setting of gastroparesis  - Hold C diff testing unless abdominal exam becomes concerning or persistent diarrhea  - PPI 40 mg PO daily

## 2022-04-03 LAB
ALBUMIN SERPL ELPH-MCNC: 2.6 G/DL — LOW (ref 3.3–5)
ALP SERPL-CCNC: 92 U/L — SIGNIFICANT CHANGE UP (ref 40–120)
ALT FLD-CCNC: 67 U/L — HIGH (ref 10–45)
ANION GAP SERPL CALC-SCNC: 6 MMOL/L — SIGNIFICANT CHANGE UP (ref 5–17)
AST SERPL-CCNC: 39 U/L — SIGNIFICANT CHANGE UP (ref 10–40)
BILIRUB SERPL-MCNC: 0.3 MG/DL — SIGNIFICANT CHANGE UP (ref 0.2–1.2)
BUN SERPL-MCNC: 10 MG/DL — SIGNIFICANT CHANGE UP (ref 7–23)
CALCIUM SERPL-MCNC: 8.6 MG/DL — SIGNIFICANT CHANGE UP (ref 8.4–10.5)
CHLORIDE SERPL-SCNC: 100 MMOL/L — SIGNIFICANT CHANGE UP (ref 96–108)
CO2 SERPL-SCNC: 31 MMOL/L — SIGNIFICANT CHANGE UP (ref 22–31)
CREAT SERPL-MCNC: 0.4 MG/DL — LOW (ref 0.5–1.3)
EGFR: 104 ML/MIN/1.73M2 — SIGNIFICANT CHANGE UP
GLUCOSE BLDC GLUCOMTR-MCNC: 163 MG/DL — HIGH (ref 70–99)
GLUCOSE BLDC GLUCOMTR-MCNC: 187 MG/DL — HIGH (ref 70–99)
GLUCOSE BLDC GLUCOMTR-MCNC: 221 MG/DL — HIGH (ref 70–99)
GLUCOSE BLDC GLUCOMTR-MCNC: 250 MG/DL — HIGH (ref 70–99)
GLUCOSE BLDC GLUCOMTR-MCNC: 344 MG/DL — HIGH (ref 70–99)
GLUCOSE SERPL-MCNC: 177 MG/DL — HIGH (ref 70–99)
HCT VFR BLD CALC: 32.7 % — LOW (ref 34.5–45)
HGB BLD-MCNC: 10 G/DL — LOW (ref 11.5–15.5)
MAGNESIUM SERPL-MCNC: 1.7 MG/DL — SIGNIFICANT CHANGE UP (ref 1.6–2.6)
MCHC RBC-ENTMCNC: 22.2 PG — LOW (ref 27–34)
MCHC RBC-ENTMCNC: 30.6 GM/DL — LOW (ref 32–36)
MCV RBC AUTO: 72.7 FL — LOW (ref 80–100)
NRBC # BLD: 2 /100 WBCS — HIGH (ref 0–0)
PHOSPHATE SERPL-MCNC: 1.3 MG/DL — LOW (ref 2.5–4.5)
PLATELET # BLD AUTO: 303 K/UL — SIGNIFICANT CHANGE UP (ref 150–400)
POTASSIUM SERPL-MCNC: 4.5 MMOL/L — SIGNIFICANT CHANGE UP (ref 3.5–5.3)
POTASSIUM SERPL-SCNC: 4.5 MMOL/L — SIGNIFICANT CHANGE UP (ref 3.5–5.3)
PROT SERPL-MCNC: 6 G/DL — SIGNIFICANT CHANGE UP (ref 6–8.3)
RBC # BLD: 4.5 M/UL — SIGNIFICANT CHANGE UP (ref 3.8–5.2)
RBC # FLD: 18.5 % — HIGH (ref 10.3–14.5)
SODIUM SERPL-SCNC: 137 MMOL/L — SIGNIFICANT CHANGE UP (ref 135–145)
VANCOMYCIN TROUGH SERPL-MCNC: 16.2 UG/ML — SIGNIFICANT CHANGE UP (ref 10–20)
WBC # BLD: 7.33 K/UL — SIGNIFICANT CHANGE UP (ref 3.8–10.5)
WBC # FLD AUTO: 7.33 K/UL — SIGNIFICANT CHANGE UP (ref 3.8–10.5)

## 2022-04-03 PROCEDURE — 99233 SBSQ HOSP IP/OBS HIGH 50: CPT | Mod: GC

## 2022-04-03 RX ORDER — INSULIN LISPRO 100/ML
7 VIAL (ML) SUBCUTANEOUS
Refills: 0 | Status: DISCONTINUED | OUTPATIENT
Start: 2022-04-03 | End: 2022-04-06

## 2022-04-03 RX ORDER — POTASSIUM PHOSPHATE, MONOBASIC POTASSIUM PHOSPHATE, DIBASIC 236; 224 MG/ML; MG/ML
30 INJECTION, SOLUTION INTRAVENOUS ONCE
Refills: 0 | Status: COMPLETED | OUTPATIENT
Start: 2022-04-03 | End: 2022-04-03

## 2022-04-03 RX ADMIN — SODIUM CHLORIDE 4 MILLILITER(S): 9 INJECTION INTRAMUSCULAR; INTRAVENOUS; SUBCUTANEOUS at 06:16

## 2022-04-03 RX ADMIN — SENNA PLUS 2 TABLET(S): 8.6 TABLET ORAL at 22:26

## 2022-04-03 RX ADMIN — FLUCONAZOLE 100 MILLIGRAM(S): 150 TABLET ORAL at 06:12

## 2022-04-03 RX ADMIN — APIXABAN 5 MILLIGRAM(S): 2.5 TABLET, FILM COATED ORAL at 17:04

## 2022-04-03 RX ADMIN — Medication 5 UNIT(S): at 12:46

## 2022-04-03 RX ADMIN — Medication 3 MILLILITER(S): at 06:13

## 2022-04-03 RX ADMIN — MEXILETINE HYDROCHLORIDE 200 MILLIGRAM(S): 150 CAPSULE ORAL at 06:13

## 2022-04-03 RX ADMIN — MUPIROCIN 1 APPLICATION(S): 20 OINTMENT TOPICAL at 06:16

## 2022-04-03 RX ADMIN — Medication 7 UNIT(S): at 17:03

## 2022-04-03 RX ADMIN — Medication 8 MILLIGRAM(S): at 06:14

## 2022-04-03 RX ADMIN — ZINC SULFATE TAB 220 MG (50 MG ZINC EQUIVALENT) 220 MILLIGRAM(S): 220 (50 ZN) TAB at 11:23

## 2022-04-03 RX ADMIN — Medication 250 MILLIGRAM(S): at 00:13

## 2022-04-03 RX ADMIN — Medication 4: at 12:46

## 2022-04-03 RX ADMIN — Medication 5 UNIT(S): at 08:24

## 2022-04-03 RX ADMIN — MEXILETINE HYDROCHLORIDE 200 MILLIGRAM(S): 150 CAPSULE ORAL at 22:26

## 2022-04-03 RX ADMIN — Medication 250 MILLIGRAM(S): at 11:23

## 2022-04-03 RX ADMIN — ATORVASTATIN CALCIUM 20 MILLIGRAM(S): 80 TABLET, FILM COATED ORAL at 22:26

## 2022-04-03 RX ADMIN — Medication 1 TABLET(S): at 11:24

## 2022-04-03 RX ADMIN — APIXABAN 5 MILLIGRAM(S): 2.5 TABLET, FILM COATED ORAL at 06:14

## 2022-04-03 RX ADMIN — POLYETHYLENE GLYCOL 3350 17 GRAM(S): 17 POWDER, FOR SOLUTION ORAL at 06:16

## 2022-04-03 RX ADMIN — SODIUM CHLORIDE 4 MILLILITER(S): 9 INJECTION INTRAMUSCULAR; INTRAVENOUS; SUBCUTANEOUS at 17:04

## 2022-04-03 RX ADMIN — LACTULOSE 15 GRAM(S): 10 SOLUTION ORAL at 11:24

## 2022-04-03 RX ADMIN — Medication 150 MILLIGRAM(S): at 17:03

## 2022-04-03 RX ADMIN — POTASSIUM PHOSPHATE, MONOBASIC POTASSIUM PHOSPHATE, DIBASIC 83.33 MILLIMOLE(S): 236; 224 INJECTION, SOLUTION INTRAVENOUS at 11:24

## 2022-04-03 RX ADMIN — CEFEPIME 100 MILLIGRAM(S): 1 INJECTION, POWDER, FOR SOLUTION INTRAMUSCULAR; INTRAVENOUS at 08:25

## 2022-04-03 RX ADMIN — CHLORHEXIDINE GLUCONATE 1 APPLICATION(S): 213 SOLUTION TOPICAL at 08:25

## 2022-04-03 RX ADMIN — PANTOPRAZOLE SODIUM 40 MILLIGRAM(S): 20 TABLET, DELAYED RELEASE ORAL at 06:15

## 2022-04-03 RX ADMIN — Medication 150 MILLIGRAM(S): at 06:16

## 2022-04-03 RX ADMIN — Medication 3 MILLILITER(S): at 17:04

## 2022-04-03 RX ADMIN — Medication 2: at 08:25

## 2022-04-03 RX ADMIN — MEXILETINE HYDROCHLORIDE 200 MILLIGRAM(S): 150 CAPSULE ORAL at 13:02

## 2022-04-03 RX ADMIN — INSULIN GLARGINE 14 UNIT(S): 100 INJECTION, SOLUTION SUBCUTANEOUS at 22:19

## 2022-04-03 RX ADMIN — Medication 1: at 17:03

## 2022-04-03 RX ADMIN — Medication 3 MILLILITER(S): at 11:23

## 2022-04-03 RX ADMIN — POLYETHYLENE GLYCOL 3350 17 GRAM(S): 17 POWDER, FOR SOLUTION ORAL at 17:03

## 2022-04-03 RX ADMIN — Medication 500 MILLIGRAM(S): at 11:24

## 2022-04-03 NOTE — PROGRESS NOTE ADULT - PROBLEM SELECTOR PLAN 10
- Started after extubation at [facility]  - ENT eval, appreciate assistance: laryngoscope at bedside with diffuse yellow patches of debris ?thrush  - diflucan 200 mg IV first dose, then diflucan 100 mg IV x6 days then ENT will rescope after treatment (), increased to 200 mg IV per ID recs  - FEES scan: pureed diet and thin liquids, continued ST for dysphagia rehab

## 2022-04-03 NOTE — PROGRESS NOTE ADULT - PROBLEM SELECTOR PLAN 6
- Not hypotensive, however low threshold for stress dose steroids if hypotensive given severe sepsis  - Cont home steroids

## 2022-04-03 NOTE — PROGRESS NOTE ADULT - PROBLEM SELECTOR PLAN 3
- Multifactorial in setting of pneumonia, bronchiectasis exacerbation, likely asthma exacerbation. PE on differential however she has been on therapeutic AC for afib, patient has contrast to allergy with SOB as reaction. Unclear history of COPD, states her sister smokes and she has secondhand exposure, however COPD diagnosis was never fully established however she does have known severe persistent asthma. Discharged to rehab on O2, unclear how much but not on O2 usually at home. +MRSA pneumonia  - CT chest 3/29: worsening extensive bronchial impaction and tree in bud nodularity with new right lower lobe consolidation iso bronchiectasis. diffuse bronchial mucoid impaction  - Wean O2 as tolerated  - c/w methylprednisolone 8 mg qd   - aggressive pulmonary toilet hygiene: chest vest, acapella, incentive spirometry, duonebs q6, hypertonic sal q12  - pulmonary Dr. Allison following

## 2022-04-03 NOTE — PROGRESS NOTE ADULT - PROBLEM SELECTOR PLAN 7
- Last A1c 7.8. Pt uses lantus 15 u qhs and Novolog varying dose depending on sugar, however was unable to elaborate on a usual dose of Novolog. Has been eating pureed diet due to weakness since last hospital stay  - Lantus 10 u qhs  - Admelog 3 units  - low dose ISS  - Monitor fingersticks for goal 140-180 - Last A1c 7.8. Pt uses lantus 15 u qhs and Novolog varying dose depending on sugar, however was unable to elaborate on a usual dose of Novolog. Has been eating pureed diet due to weakness since last hospital stay  - Lantus 10 + Admelog 3 TID  - low dose ISS  - Monitor fingersticks for goal 140-180

## 2022-04-03 NOTE — PROGRESS NOTE ADULT - ASSESSMENT
71F PMHx tracheobronchomalacia s/p Tracheobronchoplasty, Bronchiectasis, Severe Allergic Asthma, Adrenal Insuffiencey, DM2, Colorectal cancer s/p total colectomy now with colostomy, PAF on Eliquis admitted with severe sepsis and acute hypoxic respiratory failure possibly from pneumonia and concomitant bronchiectasis exacerbation.

## 2022-04-03 NOTE — PROGRESS NOTE ADULT - PROBLEM SELECTOR PLAN 9
- 2018, s/p total colectomy and chemotherapy. Last CT Feb 2022 without evidence of metastatic disease or pathologic adenopathy  - CTM

## 2022-04-03 NOTE — PROGRESS NOTE ADULT - SUBJECTIVE AND OBJECTIVE BOX
Contact Information:  Nusrat Ordonez II, MD, MPH  PGY-3, Internal Medicine  Pager: [phone] ([facility]) /// [phone] ([facility])    RAYRAY RODRIGUEZ, MRN-[id number]    Patient is a 71y old  Female who presents with a chief complaint of sepsis (02 Apr 2022 12:06)      OVERNIGHT EVENTS/INTERVAL/SUBJECTIVE: No overnight events.     CONSTITUTIONAL: No weakness. No fatigue. No fever.  HEAD: No head trauma.   EYES: No vision changes.  ENT: No hearing changes or tinnitus. No ear pain. No changes in smell. No nasal congestion or discharge. No sore throat. No voice hoarseness.   NECK: No neck pain or stiffness. No lumps.  RESPIRATORY: No cough. No SOB. No wheezing. No hemoptysis.   CARDIOVASCULAR: No chest pain. No palpitations.   GASTROINTESTINAL: No dysphagia. No ABD pain. No distension. No constipation. No diarrhea. No pain with defecation. No hematemesis. No hematochezia or melena.  BACK: No back pain.  GENITOURINARY: No dysuria. No frequency or urgency. No hesitancy. No incontinence. No urinary retention. No suprapubic pain. No hematuria.  EXTREMITY: No swelling.  MUSCULOSKELETAL: No joint pain or swelling. No fractures. No stiffness.    SKIN: No rashes. No itching. No skin, hair, or nail changes.  NEUROLOGICAL: No weakness or paralysis. No lightheadedness or dizziness. No HA. No numbness or tingling.   PSYCHIATRIC: No depression.       OBJECTIVE:  Vital Signs Last 24 Hrs  T(C): 36.6 (03 Apr 2022 04:40), Max: 36.9 (02 Apr 2022 20:39)  T(F): 97.9 (03 Apr 2022 04:40), Max: 98.5 (02 Apr 2022 20:39)  HR: 81 (03 Apr 2022 04:40) (80 - 83)  BP: 135/70 (03 Apr 2022 04:40) (119/62 - 135/70)  BP(mean): --  RR: 18 (03 Apr 2022 04:40) (18 - 18)  SpO2: 94% (03 Apr 2022 04:40) (94% - 97%)  I&O's Summary    01 Apr 2022 07:01  -  02 Apr 2022 07:00  --------------------------------------------------------  IN: 830 mL / OUT: 350 mL / NET: 480 mL    02 Apr 2022 07:01  -  03 Apr 2022 06:22  --------------------------------------------------------  IN: 365 mL / OUT: 500 mL / NET: -135 mL        MEDICATIONS  (STANDING):  albuterol/ipratropium for Nebulization 3 milliLiter(s) Nebulizer every 6 hours  apixaban 5 milliGRAM(s) Oral every 12 hours  ascorbic acid 500 milliGRAM(s) Oral daily  atorvastatin 20 milliGRAM(s) Oral at bedtime  cefepime   IVPB 2000 milliGRAM(s) IV Intermittent every 8 hours  chlorhexidine 2% Cloths 1 Application(s) Topical <User Schedule>  dextrose 5%. 1000 milliLiter(s) (50 mL/Hr) IV Continuous <Continuous>  dextrose 5%. 1000 milliLiter(s) (100 mL/Hr) IV Continuous <Continuous>  dextrose 5%. 1000 milliLiter(s) (50 mL/Hr) IV Continuous <Continuous>  dextrose 5%. 1000 milliLiter(s) (100 mL/Hr) IV Continuous <Continuous>  dextrose 50% Injectable 25 Gram(s) IV Push once  dextrose 50% Injectable 12.5 Gram(s) IV Push once  dextrose 50% Injectable 25 Gram(s) IV Push once  dextrose 50% Injectable 25 Gram(s) IV Push once  dextrose 50% Injectable 12.5 Gram(s) IV Push once  dextrose 50% Injectable 25 Gram(s) IV Push once  fluconAZOLE IVPB 200 milliGRAM(s) IV Intermittent every 24 hours  glucagon  Injectable 1 milliGRAM(s) IntraMuscular once  glucagon  Injectable 1 milliGRAM(s) IntraMuscular once  influenza  Vaccine (HIGH DOSE) 0.7 milliLiter(s) IntraMuscular once  insulin glargine Injectable (LANTUS) 14 Unit(s) SubCutaneous at bedtime  insulin lispro (ADMELOG) corrective regimen sliding scale   SubCutaneous three times a day before meals  insulin lispro (ADMELOG) corrective regimen sliding scale   SubCutaneous at bedtime  insulin lispro Injectable (ADMELOG) 5 Unit(s) SubCutaneous three times a day before meals  lactulose Syrup 15 Gram(s) Oral daily  methylPREDNISolone 8 milliGRAM(s) Oral daily  mexiletine 200 milliGRAM(s) Oral three times a day  multivitamin 1 Tablet(s) Oral daily  pantoprazole    Tablet 40 milliGRAM(s) Oral before breakfast  polyethylene glycol 3350 17 Gram(s) Oral two times a day  pregabalin 150 milliGRAM(s) Oral two times a day  senna 2 Tablet(s) Oral at bedtime  sodium chloride 7% Inhalation 4 milliLiter(s) Inhalation every 12 hours  vancomycin  IVPB 1000 milliGRAM(s) IV Intermittent every 12 hours  zinc sulfate 220 milliGRAM(s) Oral daily    MEDICATIONS  (PRN):  acetaminophen     Tablet .. 975 milliGRAM(s) Oral every 6 hours PRN Temp greater or equal to 38C (100.4F), Mild Pain (1 - 3)  dextrose Oral Gel 15 Gram(s) Oral once PRN Blood Glucose LESS THAN 70 milliGRAM(s)/deciliter  dextrose Oral Gel 15 Gram(s) Oral once PRN Blood Glucose LESS THAN 70 milliGRAM(s)/deciliter  ondansetron Injectable 4 milliGRAM(s) IV Push every 8 hours PRN Nausea and/or Vomiting    Allergies    aspirin (Short breath)  Avelox (Short breath; Pruritus)  codeine (Short breath)  Dilaudid (Short breath)  iodine (Short breath; Swelling)  shellfish (Anaphylaxis)  tetanus toxoid (Short breath)  Valium (Short breath)    Intolerances        CONSTITUTIONAL: No acute distress. Awake and alert.  HEAD: No evidence of trauma. Structures WNL.  EYES: +PERRL. +EOMI. No scleral icterus. No conjunctival injection.  ENT: Moist oral mucosa. No erythema. No pharyngeal exudates.   NECK: Supple. Appropriate ROM. No stiffness. No masses or lymphadenopathy.  RESPIRATORY: CTAB. No wheezes, rales, or rhonchi. No accessory muscle use. No apparent respiratory distress.  CARDIOVASCULAR: +S1/S2. No audible S3/S4. Regular rate and rhythm. No murmurs, rubs, or gallops. 2+ radial pulses x b/l UE; 2+ DP pulses x b/l LE.   GASTROINTESTINAL: Soft, nontender, nondistended. +BS. No rebound or guarding.   BACK: No spinal or paraspinal tenderness. No CVA tenderness.  EXTREMITY: No LE swelling or edema. EXTs warm to touch.  MUSCULOSKELETAL: Spontaneous movement in all extremities.  DERMATOLOGICAL: No abnormal rashes or lesions.  NEUROLOGICAL: CN 2-12 grossly intact. No focal deficits. Sensation intact x 4EXT. A&Ox3 (oriented to person, place, and time).  PSYCHIATRIC: Appropriate affect.                            9.0    4.87  )-----------( 211      ( 02 Apr 2022 05:34 )             29.7       04-02    139  |  99  |  10  ----------------------------<  286<H>  4.0   |  32<H>  |  0.42<L>    Ca    8.2<L>      02 Apr 2022 05:34  Phos  1.8     04-02  Mg     1.7     04-02    TPro  5.2<L>  /  Alb  2.4<L>  /  TBili  0.2  /  DBili  x   /  AST  40  /  ALT  56<H>  /  AlkPhos  78  04-02    CAPILLARY BLOOD GLUCOSE      POCT Blood Glucose.: 222 mg/dL (02 Apr 2022 23:44)  POCT Blood Glucose.: 91 mg/dL (02 Apr 2022 21:41)  POCT Blood Glucose.: 268 mg/dL (02 Apr 2022 16:50)  POCT Blood Glucose.: 396 mg/dL (02 Apr 2022 12:07)  POCT Blood Glucose.: 245 mg/dL (02 Apr 2022 08:10)    LIVER FUNCTIONS - ( 02 Apr 2022 05:34 )  Alb: 2.4 g/dL / Pro: 5.2 g/dL / ALK PHOS: 78 U/L / ALT: 56 U/L / AST: 40 U/L / GGT: x                   Culture - Blood (collected 01 Apr 2022 14:38)  Source: .Blood Blood  Preliminary Report (02 Apr 2022 15:05):    No growth to date.    Culture - Blood (collected 01 Apr 2022 14:38)  Source: .Blood Blood  Preliminary Report (02 Apr 2022 15:05):    No growth to date.          RADIOLOGY AND ADDITIONAL TESTS:    CONSULTANT NOTES REVIEWED:    CARE DISCUSSED WITH THE FOLLOWING CONSULTANTS/PROVIDERS: Contact Information:  Nusrat Ordonez II, MD, MPH  PGY-3, Internal Medicine  Pager: [phone] ([facility]) /// [phone] ([facility])    RAYRAY RODRIGUEZ, MRN-[id number]    Patient is a 71y old  Female who presents with a chief complaint of sepsis (02 Apr 2022 12:06)      OVERNIGHT EVENTS/INTERVAL/SUBJECTIVE: No overnight events. Patient evaluated at bedside, no acute complaints. Per Nurse, output noted from ostomy for 1st time in 2 days (small solid clumps --> pasty stool). She denies fever, lightheadedness, dizziness, SOB, CP, ABD pain, nausea/vomiting, numbness/tingling.     CONSTITUTIONAL: No weakness. No fatigue. No fever.  HEAD: No head trauma.   EYES: No vision changes.  ENT: No hearing changes or tinnitus. No ear pain. No changes in smell. No nasal congestion or discharge. No sore throat. No voice hoarseness.   NECK: No neck pain or stiffness. No lumps.  RESPIRATORY: No cough. No SOB. No wheezing. No hemoptysis.   CARDIOVASCULAR: No chest pain. No palpitations.   GASTROINTESTINAL: No dysphagia. No ABD pain. No distension. No constipation. No diarrhea. No pain with defecation. No hematemesis. No hematochezia or melena.  BACK: No back pain.  GENITOURINARY: No dysuria. No frequency or urgency. No hesitancy. No incontinence. No urinary retention. No suprapubic pain. No hematuria.  EXTREMITY: No swelling.  MUSCULOSKELETAL: No joint pain or swelling. No fractures. No stiffness.    SKIN: No rashes. No itching. No skin, hair, or nail changes.  NEUROLOGICAL: No weakness or paralysis. No lightheadedness or dizziness. No HA. No numbness or tingling.   PSYCHIATRIC: No depression.       OBJECTIVE:  Vital Signs Last 24 Hrs  T(C): 36.6 (03 Apr 2022 04:40), Max: 36.9 (02 Apr 2022 20:39)  T(F): 97.9 (03 Apr 2022 04:40), Max: 98.5 (02 Apr 2022 20:39)  HR: 81 (03 Apr 2022 04:40) (80 - 83)  BP: 135/70 (03 Apr 2022 04:40) (119/62 - 135/70)  BP(mean): --  RR: 18 (03 Apr 2022 04:40) (18 - 18)  SpO2: 94% (03 Apr 2022 04:40) (94% - 97%)  I&O's Summary    01 Apr 2022 07:01  -  02 Apr 2022 07:00  --------------------------------------------------------  IN: 830 mL / OUT: 350 mL / NET: 480 mL    02 Apr 2022 07:01  -  03 Apr 2022 06:22  --------------------------------------------------------  IN: 365 mL / OUT: 500 mL / NET: -135 mL        MEDICATIONS  (STANDING):  albuterol/ipratropium for Nebulization 3 milliLiter(s) Nebulizer every 6 hours  apixaban 5 milliGRAM(s) Oral every 12 hours  ascorbic acid 500 milliGRAM(s) Oral daily  atorvastatin 20 milliGRAM(s) Oral at bedtime  cefepime   IVPB 2000 milliGRAM(s) IV Intermittent every 8 hours  chlorhexidine 2% Cloths 1 Application(s) Topical <User Schedule>  dextrose 5%. 1000 milliLiter(s) (50 mL/Hr) IV Continuous <Continuous>  dextrose 5%. 1000 milliLiter(s) (100 mL/Hr) IV Continuous <Continuous>  dextrose 5%. 1000 milliLiter(s) (50 mL/Hr) IV Continuous <Continuous>  dextrose 5%. 1000 milliLiter(s) (100 mL/Hr) IV Continuous <Continuous>  dextrose 50% Injectable 25 Gram(s) IV Push once  dextrose 50% Injectable 12.5 Gram(s) IV Push once  dextrose 50% Injectable 25 Gram(s) IV Push once  dextrose 50% Injectable 25 Gram(s) IV Push once  dextrose 50% Injectable 12.5 Gram(s) IV Push once  dextrose 50% Injectable 25 Gram(s) IV Push once  fluconAZOLE IVPB 200 milliGRAM(s) IV Intermittent every 24 hours  glucagon  Injectable 1 milliGRAM(s) IntraMuscular once  glucagon  Injectable 1 milliGRAM(s) IntraMuscular once  influenza  Vaccine (HIGH DOSE) 0.7 milliLiter(s) IntraMuscular once  insulin glargine Injectable (LANTUS) 14 Unit(s) SubCutaneous at bedtime  insulin lispro (ADMELOG) corrective regimen sliding scale   SubCutaneous three times a day before meals  insulin lispro (ADMELOG) corrective regimen sliding scale   SubCutaneous at bedtime  insulin lispro Injectable (ADMELOG) 5 Unit(s) SubCutaneous three times a day before meals  lactulose Syrup 15 Gram(s) Oral daily  methylPREDNISolone 8 milliGRAM(s) Oral daily  mexiletine 200 milliGRAM(s) Oral three times a day  multivitamin 1 Tablet(s) Oral daily  pantoprazole    Tablet 40 milliGRAM(s) Oral before breakfast  polyethylene glycol 3350 17 Gram(s) Oral two times a day  pregabalin 150 milliGRAM(s) Oral two times a day  senna 2 Tablet(s) Oral at bedtime  sodium chloride 7% Inhalation 4 milliLiter(s) Inhalation every 12 hours  vancomycin  IVPB 1000 milliGRAM(s) IV Intermittent every 12 hours  zinc sulfate 220 milliGRAM(s) Oral daily    MEDICATIONS  (PRN):  acetaminophen     Tablet .. 975 milliGRAM(s) Oral every 6 hours PRN Temp greater or equal to 38C (100.4F), Mild Pain (1 - 3)  dextrose Oral Gel 15 Gram(s) Oral once PRN Blood Glucose LESS THAN 70 milliGRAM(s)/deciliter  dextrose Oral Gel 15 Gram(s) Oral once PRN Blood Glucose LESS THAN 70 milliGRAM(s)/deciliter  ondansetron Injectable 4 milliGRAM(s) IV Push every 8 hours PRN Nausea and/or Vomiting    Allergies    aspirin (Short breath)  Avelox (Short breath; Pruritus)  codeine (Short breath)  Dilaudid (Short breath)  iodine (Short breath; Swelling)  shellfish (Anaphylaxis)  tetanus toxoid (Short breath)  Valium (Short breath)    Intolerances        CONSTITUTIONAL: No acute distress. Awake and alert.  RESPIRATORY: CTAB. No wheezes, rales, or rhonchi. No accessory muscle use. No apparent respiratory distress.  CARDIOVASCULAR: +S1/S2. No audible S3/S4. Regular rate and rhythm. No murmurs, rubs, or gallops. Tele - NSR 70-90 with PVCs.   GASTROINTESTINAL: Soft, nontender, nondistended. +BS. No rebound or guarding.   EXTREMITY: No LE swelling or edema. EXTs warm to touch.  MUSCULOSKELETAL: Spontaneous movement in all extremities.  NEUROLOGICAL: No focal deficits. A&Ox3 (oriented to person, place, and time).                            9.0    4.87  )-----------( 211      ( 02 Apr 2022 05:34 )             29.7       04-02    139  |  99  |  10  ----------------------------<  286<H>  4.0   |  32<H>  |  0.42<L>    Ca    8.2<L>      02 Apr 2022 05:34  Phos  1.8     04-02  Mg     1.7     04-02    TPro  5.2<L>  /  Alb  2.4<L>  /  TBili  0.2  /  DBili  x   /  AST  40  /  ALT  56<H>  /  AlkPhos  78  04-02    CAPILLARY BLOOD GLUCOSE      POCT Blood Glucose.: 222 mg/dL (02 Apr 2022 23:44)  POCT Blood Glucose.: 91 mg/dL (02 Apr 2022 21:41)  POCT Blood Glucose.: 268 mg/dL (02 Apr 2022 16:50)  POCT Blood Glucose.: 396 mg/dL (02 Apr 2022 12:07)  POCT Blood Glucose.: 245 mg/dL (02 Apr 2022 08:10)    LIVER FUNCTIONS - ( 02 Apr 2022 05:34 )  Alb: 2.4 g/dL / Pro: 5.2 g/dL / ALK PHOS: 78 U/L / ALT: 56 U/L / AST: 40 U/L / GGT: x                   Culture - Blood (collected 01 Apr 2022 14:38)  Source: .Blood Blood  Preliminary Report (02 Apr 2022 15:05):    No growth to date.    Culture - Blood (collected 01 Apr 2022 14:38)  Source: .Blood Blood  Preliminary Report (02 Apr 2022 15:05):    No growth to date.          RADIOLOGY AND ADDITIONAL TESTS:    CONSULTANT NOTES REVIEWED:    CARE DISCUSSED WITH THE FOLLOWING CONSULTANTS/PROVIDERS:

## 2022-04-03 NOTE — PROGRESS NOTE ADULT - PROBLEM SELECTOR PLAN 4
- Multiple episodes of vomiting. Pt reported constipation at rehab and was on lactulose with resolution of constipation. Differential includes C diff given recent Abx use and hospital stay however abdominal exam benign. Possibly in setting of gastroparesis  - Hold C diff testing unless abdominal exam becomes concerning or persistent diarrhea  - PPI 40 mg PO daily

## 2022-04-03 NOTE — PROGRESS NOTE ADULT - PROBLEM SELECTOR PLAN 8
- EKG showing sinus tachycardia, likely in setting of infection and pulmonary pathology  - Cont Eliquis 5 mg BID  - Cont home mexiletine 200 mg TID

## 2022-04-03 NOTE — PROGRESS NOTE ADULT - PROBLEM SELECTOR PLAN 5
- Likely exacerbation in setting of sepsis. S/p tracheoplasty with residual frequent mucus production  - Cont IV antibiotics with Pseudomonal coverage for likely acute exacerbation  - Cont home steroids for severe persistent asthma and adrenal insufficiency  - Pt uses nebulizer treatment at home, duonebs q6h standing. ensure patient is sitting upright for all inhaled meds due to hx of tracheobronchomalacia  - Cont Spiriva  - Incentive spirometry  - Pulmonary (Dr. Allison) following

## 2022-04-03 NOTE — PROGRESS NOTE ADULT - PROBLEM SELECTOR PLAN 2
- Febrile, tachycardia, tachypnea, lactate 2.1. Associated AHRF, productive cough. Recent hospital stay with intubation for pneumonia at OSH and intubated, unclear which antibiotics were given and duration of treatment. Likely in setting of pneumonia and bronchiectasis exacerbation. History of sputum Cx +Pseudomonas (Feb 2020) via bronchoscopy. Also with UA grossly positive, however pt without symptoms  - S/p cefepime and vancomycin in ED, 500 cc bolus, MRSA+, RVP neg  - Cont cefepime for Pseudomonas coverage and vancomycin given recent intubation (3/29 - ), diflucan (3/30-4/6)  - BCx 3/30 with MRSE in 1/2 bottles, likely contaminant  - UCx <50K candida glabrata  - Sputum Cx +mrsa  - fungitell, aspergillus abs, galactomannan still pending  - Bcx repeat until cleared - BCx 4/1 NGTD  - vanc trough, prior to every 4th dose  - ID consult, appreciate recs

## 2022-04-03 NOTE — PROGRESS NOTE ADULT - SUBJECTIVE AND OBJECTIVE BOX
None. Afebrile    REVIEW OF SYSTEMS:  Constitutional: No fevers or chills. No weight loss. No fatigue or generalised malaise.  Eyes: No itching or discharge from the eyes  ENT: No ear pain. No ear discharge. No nasal congestion. No post nasal drip. No epistaxis. No throat pain. No sore throat. No difficulty swallowing.   CV: No chest pain. No palpitations. No lightheadedness or dizziness.   Resp: No dyspnea at rest. No dyspnea on exertion. No orthopnea. No wheezing. No cough. No stridor. No sputum production. No chest pain with respiration.  GI: No nausea. No vomiting. No diarrhea.  MSK: No joint pain or pain in any extremities  Integumentary: No skin lesions. No pedal edema.  Neurological: No gross motor weakness. No sensory changes.  [ ] All other systems negative  [ ] Unable to assess ROS because ________    OBJECTIVE:  ICU Vital Signs Last 24 Hrs  T(C): 36.6 (03 Apr 2022 04:40), Max: 36.9 (02 Apr 2022 20:39)  T(F): 97.9 (03 Apr 2022 04:40), Max: 98.5 (02 Apr 2022 20:39)  HR: 81 (03 Apr 2022 04:40) (80 - 83)  BP: 135/70 (03 Apr 2022 04:40) (119/62 - 135/70)  BP(mean): --  ABP: --  ABP(mean): --  RR: 18 (03 Apr 2022 04:40) (18 - 18)  SpO2: 94% (03 Apr 2022 04:40) (94% - 97%)        04-02 @ 07:01  -  04-03 @ 07:00  --------------------------------------------------------  IN: 365 mL / OUT: 1325 mL / NET: -960 mL      CAPILLARY BLOOD GLUCOSE      POCT Blood Glucose.: 221 mg/dL (03 Apr 2022 08:14)      PHYSICAL EXAM:  General: Awake, alert, oriented X 3.   HEENT: Atraumatic, normocephalic.                 Mallampatti Grade                 No nasal congestion.                No tonsillar or pharyngeal exudates.  Lymph Nodes: No palpable lymphadenopathy  Neck: No JVD. No carotid bruit.   Respiratory: Normal chest expansion                         Normal percussion                         Normal and equal air entry                         No wheeze, rhonchi or rales.  Cardiovascular: S1 S2 normal. No murmurs, rubs or gallops.   Abdomen: Soft, non-tender, non-distended. No organomegaly.  Extremities: Warm to touch. Peripheral pulse palpable. No pedal edema.   Skin: No rashes or skin lesions  Neurological: Motor and sensory examination equal and normal in all four extremities.  Psychiatry: Appropriate mood and affect.    HOSPITAL MEDICATIONS:  MEDICATIONS  (STANDING):  albuterol/ipratropium for Nebulization 3 milliLiter(s) Nebulizer every 6 hours  apixaban 5 milliGRAM(s) Oral every 12 hours  ascorbic acid 500 milliGRAM(s) Oral daily  atorvastatin 20 milliGRAM(s) Oral at bedtime  chlorhexidine 2% Cloths 1 Application(s) Topical <User Schedule>  dextrose 5%. 1000 milliLiter(s) (50 mL/Hr) IV Continuous <Continuous>  dextrose 5%. 1000 milliLiter(s) (100 mL/Hr) IV Continuous <Continuous>  dextrose 5%. 1000 milliLiter(s) (50 mL/Hr) IV Continuous <Continuous>  dextrose 5%. 1000 milliLiter(s) (100 mL/Hr) IV Continuous <Continuous>  dextrose 50% Injectable 25 Gram(s) IV Push once  dextrose 50% Injectable 12.5 Gram(s) IV Push once  dextrose 50% Injectable 25 Gram(s) IV Push once  dextrose 50% Injectable 25 Gram(s) IV Push once  dextrose 50% Injectable 12.5 Gram(s) IV Push once  dextrose 50% Injectable 25 Gram(s) IV Push once  fluconAZOLE IVPB 200 milliGRAM(s) IV Intermittent every 24 hours  glucagon  Injectable 1 milliGRAM(s) IntraMuscular once  glucagon  Injectable 1 milliGRAM(s) IntraMuscular once  influenza  Vaccine (HIGH DOSE) 0.7 milliLiter(s) IntraMuscular once  insulin glargine Injectable (LANTUS) 14 Unit(s) SubCutaneous at bedtime  insulin lispro (ADMELOG) corrective regimen sliding scale   SubCutaneous three times a day before meals  insulin lispro (ADMELOG) corrective regimen sliding scale   SubCutaneous at bedtime  insulin lispro Injectable (ADMELOG) 5 Unit(s) SubCutaneous three times a day before meals  lactulose Syrup 15 Gram(s) Oral daily  methylPREDNISolone 8 milliGRAM(s) Oral daily  mexiletine 200 milliGRAM(s) Oral three times a day  multivitamin 1 Tablet(s) Oral daily  pantoprazole    Tablet 40 milliGRAM(s) Oral before breakfast  polyethylene glycol 3350 17 Gram(s) Oral two times a day  potassium phosphate IVPB 30 milliMole(s) IV Intermittent once  pregabalin 150 milliGRAM(s) Oral two times a day  senna 2 Tablet(s) Oral at bedtime  sodium chloride 7% Inhalation 4 milliLiter(s) Inhalation every 12 hours  vancomycin  IVPB 1000 milliGRAM(s) IV Intermittent every 12 hours  zinc sulfate 220 milliGRAM(s) Oral daily    MEDICATIONS  (PRN):  acetaminophen     Tablet .. 975 milliGRAM(s) Oral every 6 hours PRN Temp greater or equal to 38C (100.4F), Mild Pain (1 - 3)  dextrose Oral Gel 15 Gram(s) Oral once PRN Blood Glucose LESS THAN 70 milliGRAM(s)/deciliter  dextrose Oral Gel 15 Gram(s) Oral once PRN Blood Glucose LESS THAN 70 milliGRAM(s)/deciliter  ondansetron Injectable 4 milliGRAM(s) IV Push every 8 hours PRN Nausea and/or Vomiting      LABS:                        9.0    4.87  )-----------( 211      ( 02 Apr 2022 05:34 )             29.7     04-03    137  |  100  |  10  ----------------------------<  177<H>  4.5   |  31  |  0.40<L>    Ca    8.6      03 Apr 2022 07:35  Phos  1.3     04-03  Mg     1.7     04-03    TPro  6.0  /  Alb  2.6<L>  /  TBili  0.3  /  DBili  x   /  AST  39  /  ALT  67<H>  /  AlkPhos  92  04-03              MICROBIOLOGY:     RADIOLOGY:  [ ] Reviewed and interpreted by me    Point of Care Ultrasound Findings:    PFT:    EKG: None. Afebrile    REVIEW OF SYSTEMS:  Constitutional: No fevers or chills. No weight loss. No fatigue or generalised malaise.  Eyes: No itching or discharge from the eyes  ENT: No ear pain. No ear discharge. No nasal congestion. No post nasal drip. No epistaxis. No throat pain. No sore throat. No difficulty swallowing.   CV: No chest pain. No palpitations. No lightheadedness or dizziness.   Resp: No dyspnea at rest. No dyspnea on exertion. No orthopnea. No wheezing. No cough. No stridor. No sputum production. No chest pain with respiration.  GI: No nausea. No vomiting. No diarrhea.  MSK: No joint pain or pain in any extremities  Integumentary: No skin lesions. No pedal edema.  Neurological: No gross motor weakness. No sensory changes.  [x ] All other systems negative  [ ] Unable to assess ROS because ________    OBJECTIVE:  ICU Vital Signs Last 24 Hrs  T(C): 36.6 (03 Apr 2022 04:40), Max: 36.9 (02 Apr 2022 20:39)  T(F): 97.9 (03 Apr 2022 04:40), Max: 98.5 (02 Apr 2022 20:39)  HR: 81 (03 Apr 2022 04:40) (80 - 83)  BP: 135/70 (03 Apr 2022 04:40) (119/62 - 135/70)  BP(mean): --  ABP: --  ABP(mean): --  RR: 18 (03 Apr 2022 04:40) (18 - 18)  SpO2: 94% (03 Apr 2022 04:40) (94% - 97%)        04-02 @ 07:01  -  04-03 @ 07:00  --------------------------------------------------------  IN: 365 mL / OUT: 1325 mL / NET: -960 mL      CAPILLARY BLOOD GLUCOSE      POCT Blood Glucose.: 221 mg/dL (03 Apr 2022 08:14)      PHYSICAL EXAM:  General: Awake, alert, oriented X 3. Nasal cannula. obese  HEENT: neg  Lymph Nodes: No palpable lymphadenopathy  Neck: No JVD. No carotid bruit.   Respiratory: min exp wheeze  Cardiovascular: S1 S2 normal. No murmurs, rubs or gallops.   Abdomen: Soft, non-tender, non-distended. No organomegaly.  Extremities: Warm to touch. Peripheral pulse palpable. No pedal edema.   Skin: No rashes or skin lesions  Neurological: Motor and sensory examination equal and normal in all four extremities.  Psychiatry: Appropriate mood and affect.    HOSPITAL MEDICATIONS:  MEDICATIONS  (STANDING):  albuterol/ipratropium for Nebulization 3 milliLiter(s) Nebulizer every 6 hours  apixaban 5 milliGRAM(s) Oral every 12 hours  ascorbic acid 500 milliGRAM(s) Oral daily  atorvastatin 20 milliGRAM(s) Oral at bedtime  chlorhexidine 2% Cloths 1 Application(s) Topical <User Schedule>  dextrose 5%. 1000 milliLiter(s) (50 mL/Hr) IV Continuous <Continuous>  dextrose 5%. 1000 milliLiter(s) (100 mL/Hr) IV Continuous <Continuous>  dextrose 5%. 1000 milliLiter(s) (50 mL/Hr) IV Continuous <Continuous>  dextrose 5%. 1000 milliLiter(s) (100 mL/Hr) IV Continuous <Continuous>  dextrose 50% Injectable 25 Gram(s) IV Push once  dextrose 50% Injectable 12.5 Gram(s) IV Push once  dextrose 50% Injectable 25 Gram(s) IV Push once  dextrose 50% Injectable 25 Gram(s) IV Push once  dextrose 50% Injectable 12.5 Gram(s) IV Push once  dextrose 50% Injectable 25 Gram(s) IV Push once  fluconAZOLE IVPB 200 milliGRAM(s) IV Intermittent every 24 hours  glucagon  Injectable 1 milliGRAM(s) IntraMuscular once  glucagon  Injectable 1 milliGRAM(s) IntraMuscular once  influenza  Vaccine (HIGH DOSE) 0.7 milliLiter(s) IntraMuscular once  insulin glargine Injectable (LANTUS) 14 Unit(s) SubCutaneous at bedtime  insulin lispro (ADMELOG) corrective regimen sliding scale   SubCutaneous three times a day before meals  insulin lispro (ADMELOG) corrective regimen sliding scale   SubCutaneous at bedtime  insulin lispro Injectable (ADMELOG) 5 Unit(s) SubCutaneous three times a day before meals  lactulose Syrup 15 Gram(s) Oral daily  methylPREDNISolone 8 milliGRAM(s) Oral daily  mexiletine 200 milliGRAM(s) Oral three times a day  multivitamin 1 Tablet(s) Oral daily  pantoprazole    Tablet 40 milliGRAM(s) Oral before breakfast  polyethylene glycol 3350 17 Gram(s) Oral two times a day  potassium phosphate IVPB 30 milliMole(s) IV Intermittent once  pregabalin 150 milliGRAM(s) Oral two times a day  senna 2 Tablet(s) Oral at bedtime  sodium chloride 7% Inhalation 4 milliLiter(s) Inhalation every 12 hours  vancomycin  IVPB 1000 milliGRAM(s) IV Intermittent every 12 hours  zinc sulfate 220 milliGRAM(s) Oral daily    MEDICATIONS  (PRN):  acetaminophen     Tablet .. 975 milliGRAM(s) Oral every 6 hours PRN Temp greater or equal to 38C (100.4F), Mild Pain (1 - 3)  dextrose Oral Gel 15 Gram(s) Oral once PRN Blood Glucose LESS THAN 70 milliGRAM(s)/deciliter  dextrose Oral Gel 15 Gram(s) Oral once PRN Blood Glucose LESS THAN 70 milliGRAM(s)/deciliter  ondansetron Injectable 4 milliGRAM(s) IV Push every 8 hours PRN Nausea and/or Vomiting      LABS:                        9.0    4.87  )-----------( 211      ( 02 Apr 2022 05:34 )             29.7     04-03    137  |  100  |  10  ----------------------------<  177<H>  4.5   |  31  |  0.40<L>    Ca    8.6      03 Apr 2022 07:35  Phos  1.3     04-03  Mg     1.7     04-03    TPro  6.0  /  Alb  2.6<L>  /  TBili  0.3  /  DBili  x   /  AST  39  /  ALT  67<H>  /  AlkPhos  92  04-03              M

## 2022-04-03 NOTE — PROGRESS NOTE ADULT - PROBLEM SELECTOR PLAN 11
- patient hypoglycemic to low 60s on 3/30, likely iso sepsis, adding premeal, along with decreasing methylpred to 4 mg, s/p 0.5 A d50  - C/w methylprednisolone 8 PO daily  - C/w Lantus 14 and Admelog 5 TID  - Monitor FS

## 2022-04-03 NOTE — PROGRESS NOTE ADULT - ASSESSMENT
acute on chronic respiratory failure with hypoxia, mrsa pneumonia , underlying TCM s/p plasty, obstructive lung dz  prior pseud, but neg, off cefepime now  on vanco, medrol, airway clearance  fluconazole for thrush  afebrile, on room air acute on chronic respiratory failure with hypoxia, mrsa pneumonia , underlying TCM s/p plasty, obstructive lung dz  prior pseud, but neg, off cefepime now  on vanco, medrol, airway clearance  fluconazole for thrush  afebrile, on 2lpm NC

## 2022-04-04 LAB
ALBUMIN SERPL ELPH-MCNC: 2.6 G/DL — LOW (ref 3.3–5)
ALP SERPL-CCNC: 103 U/L — SIGNIFICANT CHANGE UP (ref 40–120)
ALT FLD-CCNC: 63 U/L — HIGH (ref 10–45)
ANION GAP SERPL CALC-SCNC: 10 MMOL/L — SIGNIFICANT CHANGE UP (ref 5–17)
AST SERPL-CCNC: 30 U/L — SIGNIFICANT CHANGE UP (ref 10–40)
BILIRUB SERPL-MCNC: 0.3 MG/DL — SIGNIFICANT CHANGE UP (ref 0.2–1.2)
BUN SERPL-MCNC: 10 MG/DL — SIGNIFICANT CHANGE UP (ref 7–23)
CALCIUM SERPL-MCNC: 8.7 MG/DL — SIGNIFICANT CHANGE UP (ref 8.4–10.5)
CHLORIDE SERPL-SCNC: 100 MMOL/L — SIGNIFICANT CHANGE UP (ref 96–108)
CO2 SERPL-SCNC: 29 MMOL/L — SIGNIFICANT CHANGE UP (ref 22–31)
CREAT SERPL-MCNC: 0.39 MG/DL — LOW (ref 0.5–1.3)
CULTURE RESULTS: SIGNIFICANT CHANGE UP
EGFR: 105 ML/MIN/1.73M2 — SIGNIFICANT CHANGE UP
GALACTOMANNAN AG SERPL-ACNC: 0.03 INDEX — SIGNIFICANT CHANGE UP (ref 0–0.49)
GLUCOSE BLDC GLUCOMTR-MCNC: 166 MG/DL — HIGH (ref 70–99)
GLUCOSE BLDC GLUCOMTR-MCNC: 239 MG/DL — HIGH (ref 70–99)
GLUCOSE BLDC GLUCOMTR-MCNC: 253 MG/DL — HIGH (ref 70–99)
GLUCOSE BLDC GLUCOMTR-MCNC: 254 MG/DL — HIGH (ref 70–99)
GLUCOSE SERPL-MCNC: 191 MG/DL — HIGH (ref 70–99)
HCT VFR BLD CALC: 34.4 % — LOW (ref 34.5–45)
HGB BLD-MCNC: 10.4 G/DL — LOW (ref 11.5–15.5)
MAGNESIUM SERPL-MCNC: 1.8 MG/DL — SIGNIFICANT CHANGE UP (ref 1.6–2.6)
MCHC RBC-ENTMCNC: 22.3 PG — LOW (ref 27–34)
MCHC RBC-ENTMCNC: 30.2 GM/DL — LOW (ref 32–36)
MCV RBC AUTO: 73.7 FL — LOW (ref 80–100)
NRBC # BLD: 4 /100 WBCS — HIGH (ref 0–0)
PHOSPHATE SERPL-MCNC: 2.5 MG/DL — SIGNIFICANT CHANGE UP (ref 2.5–4.5)
PLATELET # BLD AUTO: 309 K/UL — SIGNIFICANT CHANGE UP (ref 150–400)
POTASSIUM SERPL-MCNC: 4.7 MMOL/L — SIGNIFICANT CHANGE UP (ref 3.5–5.3)
POTASSIUM SERPL-SCNC: 4.7 MMOL/L — SIGNIFICANT CHANGE UP (ref 3.5–5.3)
PROT SERPL-MCNC: 6.1 G/DL — SIGNIFICANT CHANGE UP (ref 6–8.3)
RBC # BLD: 4.67 M/UL — SIGNIFICANT CHANGE UP (ref 3.8–5.2)
RBC # FLD: 18.4 % — HIGH (ref 10.3–14.5)
SARS-COV-2 RNA SPEC QL NAA+PROBE: SIGNIFICANT CHANGE UP
SODIUM SERPL-SCNC: 139 MMOL/L — SIGNIFICANT CHANGE UP (ref 135–145)
SPECIMEN SOURCE: SIGNIFICANT CHANGE UP
WBC # BLD: 7.29 K/UL — SIGNIFICANT CHANGE UP (ref 3.8–10.5)
WBC # FLD AUTO: 7.29 K/UL — SIGNIFICANT CHANGE UP (ref 3.8–10.5)

## 2022-04-04 PROCEDURE — 99232 SBSQ HOSP IP/OBS MODERATE 35: CPT

## 2022-04-04 PROCEDURE — 99233 SBSQ HOSP IP/OBS HIGH 50: CPT | Mod: GC

## 2022-04-04 RX ADMIN — Medication 150 MILLIGRAM(S): at 06:25

## 2022-04-04 RX ADMIN — MEXILETINE HYDROCHLORIDE 200 MILLIGRAM(S): 150 CAPSULE ORAL at 12:21

## 2022-04-04 RX ADMIN — PANTOPRAZOLE SODIUM 40 MILLIGRAM(S): 20 TABLET, DELAYED RELEASE ORAL at 05:27

## 2022-04-04 RX ADMIN — FLUCONAZOLE 100 MILLIGRAM(S): 150 TABLET ORAL at 05:34

## 2022-04-04 RX ADMIN — Medication 7 UNIT(S): at 08:00

## 2022-04-04 RX ADMIN — CHLORHEXIDINE GLUCONATE 1 APPLICATION(S): 213 SOLUTION TOPICAL at 08:00

## 2022-04-04 RX ADMIN — Medication 1 TABLET(S): at 12:21

## 2022-04-04 RX ADMIN — Medication 250 MILLIGRAM(S): at 23:05

## 2022-04-04 RX ADMIN — APIXABAN 5 MILLIGRAM(S): 2.5 TABLET, FILM COATED ORAL at 05:27

## 2022-04-04 RX ADMIN — Medication 975 MILLIGRAM(S): at 09:05

## 2022-04-04 RX ADMIN — MEXILETINE HYDROCHLORIDE 200 MILLIGRAM(S): 150 CAPSULE ORAL at 05:27

## 2022-04-04 RX ADMIN — Medication 30 MILLILITER(S): at 11:42

## 2022-04-04 RX ADMIN — Medication 8 MILLIGRAM(S): at 05:27

## 2022-04-04 RX ADMIN — Medication 975 MILLIGRAM(S): at 08:22

## 2022-04-04 RX ADMIN — Medication 7 UNIT(S): at 11:43

## 2022-04-04 RX ADMIN — Medication 500 MILLIGRAM(S): at 12:21

## 2022-04-04 RX ADMIN — Medication 3: at 11:42

## 2022-04-04 RX ADMIN — SODIUM CHLORIDE 4 MILLILITER(S): 9 INJECTION INTRAMUSCULAR; INTRAVENOUS; SUBCUTANEOUS at 06:00

## 2022-04-04 RX ADMIN — Medication 3 MILLILITER(S): at 23:03

## 2022-04-04 RX ADMIN — Medication 3 MILLILITER(S): at 11:43

## 2022-04-04 RX ADMIN — Medication 7 UNIT(S): at 17:35

## 2022-04-04 RX ADMIN — Medication 3 MILLILITER(S): at 17:39

## 2022-04-04 RX ADMIN — Medication 250 MILLIGRAM(S): at 12:20

## 2022-04-04 RX ADMIN — MEXILETINE HYDROCHLORIDE 200 MILLIGRAM(S): 150 CAPSULE ORAL at 21:47

## 2022-04-04 RX ADMIN — Medication 3 MILLILITER(S): at 00:10

## 2022-04-04 RX ADMIN — APIXABAN 5 MILLIGRAM(S): 2.5 TABLET, FILM COATED ORAL at 17:39

## 2022-04-04 RX ADMIN — SODIUM CHLORIDE 4 MILLILITER(S): 9 INJECTION INTRAMUSCULAR; INTRAVENOUS; SUBCUTANEOUS at 18:27

## 2022-04-04 RX ADMIN — INSULIN GLARGINE 14 UNIT(S): 100 INJECTION, SOLUTION SUBCUTANEOUS at 21:47

## 2022-04-04 RX ADMIN — Medication 3: at 17:34

## 2022-04-04 RX ADMIN — ATORVASTATIN CALCIUM 20 MILLIGRAM(S): 80 TABLET, FILM COATED ORAL at 21:47

## 2022-04-04 RX ADMIN — Medication 250 MILLIGRAM(S): at 00:10

## 2022-04-04 RX ADMIN — Medication 2: at 07:59

## 2022-04-04 RX ADMIN — Medication 150 MILLIGRAM(S): at 17:39

## 2022-04-04 RX ADMIN — Medication 3 MILLILITER(S): at 05:28

## 2022-04-04 RX ADMIN — ZINC SULFATE TAB 220 MG (50 MG ZINC EQUIVALENT) 220 MILLIGRAM(S): 220 (50 ZN) TAB at 12:21

## 2022-04-04 NOTE — PROGRESS NOTE ADULT - PROBLEM SELECTOR PLAN 1
- patient is s/p colostomy after colon cancer s/p total colectomy. now with decreased colostomy output on 4/2.  - reportedly with constipation after holding home lactulose, per pt report. colostomy appears healthy, without any sx of infection at colostomy site, however with slight TTP at LLQ  - Output noted from ostomy for first time in 2 days - small solid clumps, now pasty  - c/w home lactulose  - c/w miralax and senna  - serial abdominal exam  - can consider GI series if without resolution - Febrile, tachycardia, tachypnea, lactate 2.1. Associated AHRF, productive cough. Recent hospital stay with intubation for pneumonia at OSH and intubated, unclear which antibiotics were given and duration of treatment. Likely in setting of pneumonia and bronchiectasis exacerbation. History of sputum Cx +Pseudomonas (Feb 2020) via bronchoscopy. Also with UA grossly positive, however pt without symptoms  - S/p cefepime and vancomycin in ED, 500 cc bolus, MRSA+, RVP neg  - cefepime for Pseudomonas coverage (3/29-4/3)  - vancomycin given recent intubation (3/29 -4/1 ), diflucan (3/30-4/8)  - BCx 3/30 with MRSE in 1/2 bottles, likely contaminant  - UCx <50K candida glabrata  - Sputum Cx +mrsa  - fungitell neg  - aspergillus abs, galactomannan still pending  - Bcx repeat until cleared - BCx 4/1 NGTD  - vanc trough, prior to every 4th dose  - ID consult, appreciate recs

## 2022-04-04 NOTE — PROGRESS NOTE ADULT - TIME BILLING
as above:  Improving--no new issues--s/p ENT evaln-thrush--diflucan in place for 7 days (D7)  multifactorial dyspnea-TBM, CB, severe persistent asthma, ? PNA, thrush , debility, anemia, CAD--O2 NC sat above 90%  ?PNA/bronchiectasis-f/up sputum cx-prior pseudomonas- (s/p ID formal evaln-RISHABH Liz/Girish et al)-on cefepime/vanco D8-MRSA and            pseudomonas as well as achromobacter and kleb   agreed with vancomycin and cefepime     TBM-CB/brochiectasis-acapella/vest rx, incentive spirometry--? fob for additional sputum?--utilize vest rx  asthma-medrol 8 mg (chronic dosing) , spiriva/symbicort, duoneb q 6, singulair 10 q hs, hypertonic saline  allergy-claritin/flonase                                 *****dysphonia/VC dysfunction-thrush-ENT evaln/swallow evaln   gerd-ppi  abnormal CT-nodule-f/up 3 months                    cards-on mult rx-to continue  PT-OOB    DVT prophylaxis              Decub care  DC planning-will need extensive rehab      (all inhaled meds and eating must be done in chair-efficacy and asp. risk)    Eulalio Allison MD-Pulmonary   [phone].

## 2022-04-04 NOTE — PROGRESS NOTE ADULT - PROBLEM SELECTOR PLAN 9
- 2018, s/p total colectomy and chemotherapy. Last CT Feb 2022 without evidence of metastatic disease or pathologic adenopathy  - CTM - Started after extubation at [facility]  - ENT eval, appreciate assistance: laryngoscope at bedside with diffuse yellow patches of debris ?thrush. plan to rescope on 4/5  - diflucan 200 mg IV first dose, then diflucan 100 mg IV x6 days then ENT will rescope after treatment (), increased to 200 mg IV per ID recs  - FEES scan: pureed diet and thin liquids, continued ST for dysphagia rehab

## 2022-04-04 NOTE — PROGRESS NOTE ADULT - PROBLEM SELECTOR PLAN 2
- Febrile, tachycardia, tachypnea, lactate 2.1. Associated AHRF, productive cough. Recent hospital stay with intubation for pneumonia at OSH and intubated, unclear which antibiotics were given and duration of treatment. Likely in setting of pneumonia and bronchiectasis exacerbation. History of sputum Cx +Pseudomonas (Feb 2020) via bronchoscopy. Also with UA grossly positive, however pt without symptoms  - S/p cefepime and vancomycin in ED, 500 cc bolus, MRSA+, RVP neg  - cefepime for Pseudomonas coverage (3/29-4/3)  - vancomycin given recent intubation (3/29 -4/1 ), diflucan (3/30-4/6)  - BCx 3/30 with MRSE in 1/2 bottles, likely contaminant  - UCx <50K candida glabrata  - Sputum Cx +mrsa  - fungitell neg  - aspergillus abs, galactomannan still pending  - Bcx repeat until cleared - BCx 4/1 NGTD  - vanc trough, prior to every 4th dose  - ID consult, appreciate recs - Multifactorial in setting of pneumonia, bronchiectasis exacerbation, likely asthma exacerbation. PE on differential however she has been on therapeutic AC for afib, patient has contrast to allergy with SOB as reaction. Unclear history of COPD, states her sister smokes and she has secondhand exposure, however COPD diagnosis was never fully established however she does have known severe persistent asthma. Discharged to rehab on O2, unclear how much but not on O2 usually at home. +MRSA pneumonia  - CT chest 3/29: worsening extensive bronchial impaction and tree in bud nodularity with new right lower lobe consolidation iso bronchiectasis. diffuse bronchial mucoid impaction  - Wean O2 as tolerated  - c/w methylprednisolone 8 mg qd   - aggressive pulmonary toilet hygiene: chest vest, acapella, incentive spirometry, duonebs q6, hypertonic sal q12  - pulmonary Dr. Allison following

## 2022-04-04 NOTE — PROGRESS NOTE ADULT - PROBLEM SELECTOR PLAN 8
- EKG showing sinus tachycardia, likely in setting of infection and pulmonary pathology  - Cont Eliquis 5 mg BID  - Cont home mexiletine 200 mg TID - 2018, s/p total colectomy and chemotherapy. Last CT Feb 2022 without evidence of metastatic disease or pathologic adenopathy  - CTM

## 2022-04-04 NOTE — PROGRESS NOTE ADULT - ASSESSMENT
70 y/o F w/tracheobronchomalacia s/p tracheobronchoplasty, severe persistent asthma, bronchiectasis, and colon cancer s/p colectomy admitted with likely exacerbation of bronchiectasis due to pneumonia.    - Supplemental O2 as needed goal O2 sat >= 90%  - Broad spectrum abx including pseudomonal coverage  - Airway clearance, acapella device, bronchodilators, chest PT  - Continue home asthma regimen  - Repeat CT scan in 3 months for follow up of new pulmonary nodule  *******************************************  3/29-no signif changes-ID f/up                                          SEE BELOW:  3/30-ENT evaln= fungus, ID f/up, diflucan in place; ? need for fob here  3/31-no new issues-weakness continues  4/1-tx to isolation room-ID evaln-MRSA and pseudomonas as well as achromobacter and kleb agree with vancomycin and cefepime for now  4/4-ID f/up-improved over all, continue on current abx as per ID

## 2022-04-04 NOTE — PROGRESS NOTE ADULT - SUBJECTIVE AND OBJECTIVE BOX
PROGRESS NOTE:   Authored by Raissa Nguyen MD  Internal Medicine      Patient is a 71y old  Female who presents with a chief complaint of sepsis (04 Apr 2022 05:14)      SUBJECTIVE / OVERNIGHT EVENTS: NAEO, patient seen and examined at bedside. patient had soft stool output from colostomy bag, cefepime stopped on 4/3. fungitell neg    MEDICATIONS  (STANDING):  albuterol/ipratropium for Nebulization 3 milliLiter(s) Nebulizer every 6 hours  apixaban 5 milliGRAM(s) Oral every 12 hours  ascorbic acid 500 milliGRAM(s) Oral daily  atorvastatin 20 milliGRAM(s) Oral at bedtime  chlorhexidine 2% Cloths 1 Application(s) Topical <User Schedule>  dextrose 5%. 1000 milliLiter(s) (50 mL/Hr) IV Continuous <Continuous>  dextrose 5%. 1000 milliLiter(s) (100 mL/Hr) IV Continuous <Continuous>  dextrose 5%. 1000 milliLiter(s) (50 mL/Hr) IV Continuous <Continuous>  dextrose 5%. 1000 milliLiter(s) (100 mL/Hr) IV Continuous <Continuous>  dextrose 50% Injectable 25 Gram(s) IV Push once  dextrose 50% Injectable 12.5 Gram(s) IV Push once  dextrose 50% Injectable 25 Gram(s) IV Push once  dextrose 50% Injectable 25 Gram(s) IV Push once  dextrose 50% Injectable 12.5 Gram(s) IV Push once  dextrose 50% Injectable 25 Gram(s) IV Push once  fluconAZOLE IVPB 200 milliGRAM(s) IV Intermittent every 24 hours  glucagon  Injectable 1 milliGRAM(s) IntraMuscular once  glucagon  Injectable 1 milliGRAM(s) IntraMuscular once  influenza  Vaccine (HIGH DOSE) 0.7 milliLiter(s) IntraMuscular once  insulin glargine Injectable (LANTUS) 14 Unit(s) SubCutaneous at bedtime  insulin lispro (ADMELOG) corrective regimen sliding scale   SubCutaneous three times a day before meals  insulin lispro (ADMELOG) corrective regimen sliding scale   SubCutaneous at bedtime  insulin lispro Injectable (ADMELOG) 7 Unit(s) SubCutaneous three times a day before meals  lactulose Syrup 15 Gram(s) Oral daily  methylPREDNISolone 8 milliGRAM(s) Oral daily  mexiletine 200 milliGRAM(s) Oral three times a day  multivitamin 1 Tablet(s) Oral daily  pantoprazole    Tablet 40 milliGRAM(s) Oral before breakfast  polyethylene glycol 3350 17 Gram(s) Oral two times a day  pregabalin 150 milliGRAM(s) Oral two times a day  senna 2 Tablet(s) Oral at bedtime  sodium chloride 7% Inhalation 4 milliLiter(s) Inhalation every 12 hours  vancomycin  IVPB 1000 milliGRAM(s) IV Intermittent every 12 hours  zinc sulfate 220 milliGRAM(s) Oral daily    MEDICATIONS  (PRN):  acetaminophen     Tablet .. 975 milliGRAM(s) Oral every 6 hours PRN Temp greater or equal to 38C (100.4F), Mild Pain (1 - 3)  dextrose Oral Gel 15 Gram(s) Oral once PRN Blood Glucose LESS THAN 70 milliGRAM(s)/deciliter  dextrose Oral Gel 15 Gram(s) Oral once PRN Blood Glucose LESS THAN 70 milliGRAM(s)/deciliter  ondansetron Injectable 4 milliGRAM(s) IV Push every 8 hours PRN Nausea and/or Vomiting      CAPILLARY BLOOD GLUCOSE      POCT Blood Glucose.: 163 mg/dL (03 Apr 2022 21:41)  POCT Blood Glucose.: 250 mg/dL (03 Apr 2022 19:56)  POCT Blood Glucose.: 187 mg/dL (03 Apr 2022 16:44)  POCT Blood Glucose.: 344 mg/dL (03 Apr 2022 12:12)  POCT Blood Glucose.: 221 mg/dL (03 Apr 2022 08:14)    I&O's Summary    03 Apr 2022 07:01  -  04 Apr 2022 07:00  --------------------------------------------------------  IN: 1430 mL / OUT: 1452 mL / NET: -22 mL        PHYSICAL EXAM:  Vital Signs Last 24 Hrs  T(C): 36.3 (04 Apr 2022 04:40), Max: 37.1 (03 Apr 2022 20:52)  T(F): 97.4 (04 Apr 2022 04:40), Max: 98.7 (03 Apr 2022 20:52)  HR: 77 (04 Apr 2022 04:40) (77 - 88)  BP: 129/67 (04 Apr 2022 04:40) (129/67 - 143/77)  BP(mean): --  RR: 18 (04 Apr 2022 04:40) (18 - 18)  SpO2: 98% (04 Apr 2022 04:40) (98% - 99%)  CONSTITUTIONAL: Well-groomed, in no apparent distress  EYES: No conjunctival or scleral injection, non-icteric;   ENMT: No external nasal lesions; MMM  NECK: Trachea midline without palpable neck mass; thyroid not enlarged and non-tender  RESPIRATORY: Breathing comfortably; no dullness to percussion; lungs CTA without wheeze/rhonchi/rales  CARDIOVASCULAR: +S1S2, RRR, no M/G/R; pedal pulses full and symmetric; no lower extremity edema  GASTROINTESTINAL: No palpable masses or tenderness, +BS throughout, no rebound/guarding; no hepatosplenomegaly; no hernia palpated  LYMPHATIC: No cervical LAD or tenderness  SKIN: No rashes or ulcers noted  NEUROLOGIC: CN II-XII intact; sensation intact in LEs b/l to light touch  PSYCHIATRIC: A+O x 3; mood and affect appropriate; appropriate insight and judgment    LABS:                        10.4   7.29  )-----------( 309      ( 04 Apr 2022 06:39 )             34.4     04-03    137  |  100  |  10  ----------------------------<  177<H>  4.5   |  31  |  0.40<L>    Ca    8.6      03 Apr 2022 07:35  Phos  1.3     04-03  Mg     1.7     04-03    TPro  6.0  /  Alb  2.6<L>  /  TBili  0.3  /  DBili  x   /  AST  39  /  ALT  67<H>  /  AlkPhos  92  04-03              Culture - Blood (collected 01 Apr 2022 14:38)  Source: .Blood Blood  Preliminary Report (02 Apr 2022 15:05):    No growth to date.    Culture - Blood (collected 01 Apr 2022 14:38)  Source: .Blood Blood  Preliminary Report (02 Apr 2022 15:05):    No growth to date.        RADIOLOGY & ADDITIONAL TESTS:  Results Reviewed:   Imaging Personally Reviewed:  Electrocardiogram Personally Reviewed:    COORDINATION OF CARE:  Care Discussed with Consultants/Other Providers [Y/N]:  Prior or Outpatient Records Reviewed [Y/N]:   PROGRESS NOTE:   Authored by Raissa Nguyen MD  Internal Medicine      Patient is a 71y old  Female who presents with a chief complaint of sepsis (04 Apr 2022 05:14)      SUBJECTIVE / OVERNIGHT EVENTS: NAEO, patient seen and examined at bedside. patient had soft stool output from colostomy bag, cefepime stopped on 4/3. fungitell neg. patient seen and exmined at bedside. reports that she is feeling better, just her backside hurts because     MEDICATIONS  (STANDING):  albuterol/ipratropium for Nebulization 3 milliLiter(s) Nebulizer every 6 hours  apixaban 5 milliGRAM(s) Oral every 12 hours  ascorbic acid 500 milliGRAM(s) Oral daily  atorvastatin 20 milliGRAM(s) Oral at bedtime  chlorhexidine 2% Cloths 1 Application(s) Topical <User Schedule>  dextrose 5%. 1000 milliLiter(s) (50 mL/Hr) IV Continuous <Continuous>  dextrose 5%. 1000 milliLiter(s) (100 mL/Hr) IV Continuous <Continuous>  dextrose 5%. 1000 milliLiter(s) (50 mL/Hr) IV Continuous <Continuous>  dextrose 5%. 1000 milliLiter(s) (100 mL/Hr) IV Continuous <Continuous>  dextrose 50% Injectable 25 Gram(s) IV Push once  dextrose 50% Injectable 12.5 Gram(s) IV Push once  dextrose 50% Injectable 25 Gram(s) IV Push once  dextrose 50% Injectable 25 Gram(s) IV Push once  dextrose 50% Injectable 12.5 Gram(s) IV Push once  dextrose 50% Injectable 25 Gram(s) IV Push once  fluconAZOLE IVPB 200 milliGRAM(s) IV Intermittent every 24 hours  glucagon  Injectable 1 milliGRAM(s) IntraMuscular once  glucagon  Injectable 1 milliGRAM(s) IntraMuscular once  influenza  Vaccine (HIGH DOSE) 0.7 milliLiter(s) IntraMuscular once  insulin glargine Injectable (LANTUS) 14 Unit(s) SubCutaneous at bedtime  insulin lispro (ADMELOG) corrective regimen sliding scale   SubCutaneous three times a day before meals  insulin lispro (ADMELOG) corrective regimen sliding scale   SubCutaneous at bedtime  insulin lispro Injectable (ADMELOG) 7 Unit(s) SubCutaneous three times a day before meals  lactulose Syrup 15 Gram(s) Oral daily  methylPREDNISolone 8 milliGRAM(s) Oral daily  mexiletine 200 milliGRAM(s) Oral three times a day  multivitamin 1 Tablet(s) Oral daily  pantoprazole    Tablet 40 milliGRAM(s) Oral before breakfast  polyethylene glycol 3350 17 Gram(s) Oral two times a day  pregabalin 150 milliGRAM(s) Oral two times a day  senna 2 Tablet(s) Oral at bedtime  sodium chloride 7% Inhalation 4 milliLiter(s) Inhalation every 12 hours  vancomycin  IVPB 1000 milliGRAM(s) IV Intermittent every 12 hours  zinc sulfate 220 milliGRAM(s) Oral daily    MEDICATIONS  (PRN):  acetaminophen     Tablet .. 975 milliGRAM(s) Oral every 6 hours PRN Temp greater or equal to 38C (100.4F), Mild Pain (1 - 3)  dextrose Oral Gel 15 Gram(s) Oral once PRN Blood Glucose LESS THAN 70 milliGRAM(s)/deciliter  dextrose Oral Gel 15 Gram(s) Oral once PRN Blood Glucose LESS THAN 70 milliGRAM(s)/deciliter  ondansetron Injectable 4 milliGRAM(s) IV Push every 8 hours PRN Nausea and/or Vomiting      CAPILLARY BLOOD GLUCOSE      POCT Blood Glucose.: 163 mg/dL (03 Apr 2022 21:41)  POCT Blood Glucose.: 250 mg/dL (03 Apr 2022 19:56)  POCT Blood Glucose.: 187 mg/dL (03 Apr 2022 16:44)  POCT Blood Glucose.: 344 mg/dL (03 Apr 2022 12:12)  POCT Blood Glucose.: 221 mg/dL (03 Apr 2022 08:14)    I&O's Summary    03 Apr 2022 07:01  -  04 Apr 2022 07:00  --------------------------------------------------------  IN: 1430 mL / OUT: 1452 mL / NET: -22 mL        PHYSICAL EXAM:  Vital Signs Last 24 Hrs  T(C): 36.3 (04 Apr 2022 04:40), Max: 37.1 (03 Apr 2022 20:52)  T(F): 97.4 (04 Apr 2022 04:40), Max: 98.7 (03 Apr 2022 20:52)  HR: 77 (04 Apr 2022 04:40) (77 - 88)  BP: 129/67 (04 Apr 2022 04:40) (129/67 - 143/77)  BP(mean): --  RR: 18 (04 Apr 2022 04:40) (18 - 18)  SpO2: 98% (04 Apr 2022 04:40) (98% - 99%)  CONSTITUTIONAL: Well-groomed, in no apparent distress  EYES: No conjunctival or scleral injection, non-icteric;   ENMT: No external nasal lesions; MMM  NECK: Trachea midline without palpable neck mass; thyroid not enlarged and non-tender  RESPIRATORY: Breathing comfortably; no dullness to percussion; lungs CTA without wheeze/rhonchi/rales  CARDIOVASCULAR: +S1S2, RRR, no M/G/R; pedal pulses full and symmetric; no lower extremity edema  GASTROINTESTINAL: No palpable masses or tenderness, +BS throughout, no rebound/guarding; no hepatosplenomegaly; no hernia palpated  LYMPHATIC: No cervical LAD or tenderness  SKIN: No rashes or ulcers noted  NEUROLOGIC: CN II-XII intact; sensation intact in LEs b/l to light touch  PSYCHIATRIC: A+O x 3; mood and affect appropriate; appropriate insight and judgment    LABS:                        10.4   7.29  )-----------( 309      ( 04 Apr 2022 06:39 )             34.4     04-03    137  |  100  |  10  ----------------------------<  177<H>  4.5   |  31  |  0.40<L>    Ca    8.6      03 Apr 2022 07:35  Phos  1.3     04-03  Mg     1.7     04-03    TPro  6.0  /  Alb  2.6<L>  /  TBili  0.3  /  DBili  x   /  AST  39  /  ALT  67<H>  /  AlkPhos  92  04-03              Culture - Blood (collected 01 Apr 2022 14:38)  Source: .Blood Blood  Preliminary Report (02 Apr 2022 15:05):    No growth to date.    Culture - Blood (collected 01 Apr 2022 14:38)  Source: .Blood Blood  Preliminary Report (02 Apr 2022 15:05):    No growth to date.        RADIOLOGY & ADDITIONAL TESTS:  Results Reviewed:   Imaging Personally Reviewed:  Electrocardiogram Personally Reviewed:    COORDINATION OF CARE:  Care Discussed with Consultants/Other Providers [Y/N]:  Prior or Outpatient Records Reviewed [Y/N]:   PROGRESS NOTE:   Authored by Raissa Nguyen MD  Internal Medicine      Patient is a 71y old  Female who presents with a chief complaint of sepsis (04 Apr 2022 05:14)      SUBJECTIVE / OVERNIGHT EVENTS: NAEO, patient seen and examined at bedside. patient had soft stool output from colostomy bag, cefepime stopped on 4/3. fungitell neg. patient seen and exmined at bedside. reports that she is feeling better, just her backside hurts because of the sacral ulcer. cough is improved, coughing less sputum. voice and strength is improved. colostomy output improved.      MEDICATIONS  (STANDING):  albuterol/ipratropium for Nebulization 3 milliLiter(s) Nebulizer every 6 hours  apixaban 5 milliGRAM(s) Oral every 12 hours  ascorbic acid 500 milliGRAM(s) Oral daily  atorvastatin 20 milliGRAM(s) Oral at bedtime  chlorhexidine 2% Cloths 1 Application(s) Topical <User Schedule>  dextrose 5%. 1000 milliLiter(s) (50 mL/Hr) IV Continuous <Continuous>  dextrose 5%. 1000 milliLiter(s) (100 mL/Hr) IV Continuous <Continuous>  dextrose 5%. 1000 milliLiter(s) (50 mL/Hr) IV Continuous <Continuous>  dextrose 5%. 1000 milliLiter(s) (100 mL/Hr) IV Continuous <Continuous>  dextrose 50% Injectable 25 Gram(s) IV Push once  dextrose 50% Injectable 12.5 Gram(s) IV Push once  dextrose 50% Injectable 25 Gram(s) IV Push once  dextrose 50% Injectable 25 Gram(s) IV Push once  dextrose 50% Injectable 12.5 Gram(s) IV Push once  dextrose 50% Injectable 25 Gram(s) IV Push once  fluconAZOLE IVPB 200 milliGRAM(s) IV Intermittent every 24 hours  glucagon  Injectable 1 milliGRAM(s) IntraMuscular once  glucagon  Injectable 1 milliGRAM(s) IntraMuscular once  influenza  Vaccine (HIGH DOSE) 0.7 milliLiter(s) IntraMuscular once  insulin glargine Injectable (LANTUS) 14 Unit(s) SubCutaneous at bedtime  insulin lispro (ADMELOG) corrective regimen sliding scale   SubCutaneous three times a day before meals  insulin lispro (ADMELOG) corrective regimen sliding scale   SubCutaneous at bedtime  insulin lispro Injectable (ADMELOG) 7 Unit(s) SubCutaneous three times a day before meals  lactulose Syrup 15 Gram(s) Oral daily  methylPREDNISolone 8 milliGRAM(s) Oral daily  mexiletine 200 milliGRAM(s) Oral three times a day  multivitamin 1 Tablet(s) Oral daily  pantoprazole    Tablet 40 milliGRAM(s) Oral before breakfast  polyethylene glycol 3350 17 Gram(s) Oral two times a day  pregabalin 150 milliGRAM(s) Oral two times a day  senna 2 Tablet(s) Oral at bedtime  sodium chloride 7% Inhalation 4 milliLiter(s) Inhalation every 12 hours  vancomycin  IVPB 1000 milliGRAM(s) IV Intermittent every 12 hours  zinc sulfate 220 milliGRAM(s) Oral daily    MEDICATIONS  (PRN):  acetaminophen     Tablet .. 975 milliGRAM(s) Oral every 6 hours PRN Temp greater or equal to 38C (100.4F), Mild Pain (1 - 3)  dextrose Oral Gel 15 Gram(s) Oral once PRN Blood Glucose LESS THAN 70 milliGRAM(s)/deciliter  dextrose Oral Gel 15 Gram(s) Oral once PRN Blood Glucose LESS THAN 70 milliGRAM(s)/deciliter  ondansetron Injectable 4 milliGRAM(s) IV Push every 8 hours PRN Nausea and/or Vomiting      CAPILLARY BLOOD GLUCOSE      POCT Blood Glucose.: 163 mg/dL (03 Apr 2022 21:41)  POCT Blood Glucose.: 250 mg/dL (03 Apr 2022 19:56)  POCT Blood Glucose.: 187 mg/dL (03 Apr 2022 16:44)  POCT Blood Glucose.: 344 mg/dL (03 Apr 2022 12:12)  POCT Blood Glucose.: 221 mg/dL (03 Apr 2022 08:14)    I&O's Summary    03 Apr 2022 07:01  -  04 Apr 2022 07:00  --------------------------------------------------------  IN: 1430 mL / OUT: 1452 mL / NET: -22 mL        PHYSICAL EXAM:  Vital Signs Last 24 Hrs  T(C): 36.3 (04 Apr 2022 04:40), Max: 37.1 (03 Apr 2022 20:52)  T(F): 97.4 (04 Apr 2022 04:40), Max: 98.7 (03 Apr 2022 20:52)  HR: 77 (04 Apr 2022 04:40) (77 - 88)  BP: 129/67 (04 Apr 2022 04:40) (129/67 - 143/77)  BP(mean): --  RR: 18 (04 Apr 2022 04:40) (18 - 18)  SpO2: 98% (04 Apr 2022 04:40) (98% - 99%)  CONSTITUTIONAL: Well-groomed, in no apparent distress  EYES: No conjunctival or scleral injection, non-icteric;   ENMT: No external nasal lesions; MMM  NECK: Trachea midline without palpable neck mass; thyroid not enlarged and non-tender  RESPIRATORY: Breathing comfortably; no dullness to percussion; lungs CTA without wheeze/rhonchi/rales  CARDIOVASCULAR: +S1S2, RRR, no M/G/R; pedal pulses full and symmetric; no lower extremity edema  GASTROINTESTINAL: No palpable masses or tenderness, +BS throughout, no rebound/guarding; no hepatosplenomegaly; no hernia palpated  LYMPHATIC: No cervical LAD or tenderness  SKIN: No rashes or ulcers noted  NEUROLOGIC: CN II-XII intact; sensation intact in LEs b/l to light touch  PSYCHIATRIC: A+O x 3; mood and affect appropriate; appropriate insight and judgment    LABS:                        10.4   7.29  )-----------( 309      ( 04 Apr 2022 06:39 )             34.4     04-03    137  |  100  |  10  ----------------------------<  177<H>  4.5   |  31  |  0.40<L>    Ca    8.6      03 Apr 2022 07:35  Phos  1.3     04-03  Mg     1.7     04-03    TPro  6.0  /  Alb  2.6<L>  /  TBili  0.3  /  DBili  x   /  AST  39  /  ALT  67<H>  /  AlkPhos  92  04-03              Culture - Blood (collected 01 Apr 2022 14:38)  Source: .Blood Blood  Preliminary Report (02 Apr 2022 15:05):    No growth to date.    Culture - Blood (collected 01 Apr 2022 14:38)  Source: .Blood Blood  Preliminary Report (02 Apr 2022 15:05):    No growth to date.        RADIOLOGY & ADDITIONAL TESTS:  Results Reviewed:   Imaging Personally Reviewed:  Electrocardiogram Personally Reviewed:    COORDINATION OF CARE:  Care Discussed with Consultants/Other Providers [Y/N]:  Prior or Outpatient Records Reviewed [Y/N]:   PROGRESS NOTE:   Authored by Raissa Nguyen MD  Internal Medicine      Patient is a 71y old  Female who presents with a chief complaint of sepsis (04 Apr 2022 05:14)      SUBJECTIVE / OVERNIGHT EVENTS: NAEO, patient seen and examined at bedside. patient had soft stool output from colostomy bag, cefepime stopped on 4/3. fungitell neg. patient seen and exmined at bedside. reports that she is feeling better, just her backside hurts because of the sacral ulcer. cough is improved, coughing less sputum. voice and strength is improved. colostomy output improved.      MEDICATIONS  (STANDING):  albuterol/ipratropium for Nebulization 3 milliLiter(s) Nebulizer every 6 hours  apixaban 5 milliGRAM(s) Oral every 12 hours  ascorbic acid 500 milliGRAM(s) Oral daily  atorvastatin 20 milliGRAM(s) Oral at bedtime  chlorhexidine 2% Cloths 1 Application(s) Topical <User Schedule>  dextrose 5%. 1000 milliLiter(s) (50 mL/Hr) IV Continuous <Continuous>  dextrose 5%. 1000 milliLiter(s) (100 mL/Hr) IV Continuous <Continuous>  dextrose 5%. 1000 milliLiter(s) (50 mL/Hr) IV Continuous <Continuous>  dextrose 5%. 1000 milliLiter(s) (100 mL/Hr) IV Continuous <Continuous>  dextrose 50% Injectable 25 Gram(s) IV Push once  dextrose 50% Injectable 12.5 Gram(s) IV Push once  dextrose 50% Injectable 25 Gram(s) IV Push once  dextrose 50% Injectable 25 Gram(s) IV Push once  dextrose 50% Injectable 12.5 Gram(s) IV Push once  dextrose 50% Injectable 25 Gram(s) IV Push once  fluconAZOLE IVPB 200 milliGRAM(s) IV Intermittent every 24 hours  glucagon  Injectable 1 milliGRAM(s) IntraMuscular once  glucagon  Injectable 1 milliGRAM(s) IntraMuscular once  influenza  Vaccine (HIGH DOSE) 0.7 milliLiter(s) IntraMuscular once  insulin glargine Injectable (LANTUS) 14 Unit(s) SubCutaneous at bedtime  insulin lispro (ADMELOG) corrective regimen sliding scale   SubCutaneous three times a day before meals  insulin lispro (ADMELOG) corrective regimen sliding scale   SubCutaneous at bedtime  insulin lispro Injectable (ADMELOG) 7 Unit(s) SubCutaneous three times a day before meals  lactulose Syrup 15 Gram(s) Oral daily  methylPREDNISolone 8 milliGRAM(s) Oral daily  mexiletine 200 milliGRAM(s) Oral three times a day  multivitamin 1 Tablet(s) Oral daily  pantoprazole    Tablet 40 milliGRAM(s) Oral before breakfast  polyethylene glycol 3350 17 Gram(s) Oral two times a day  pregabalin 150 milliGRAM(s) Oral two times a day  senna 2 Tablet(s) Oral at bedtime  sodium chloride 7% Inhalation 4 milliLiter(s) Inhalation every 12 hours  vancomycin  IVPB 1000 milliGRAM(s) IV Intermittent every 12 hours  zinc sulfate 220 milliGRAM(s) Oral daily    MEDICATIONS  (PRN):  acetaminophen     Tablet .. 975 milliGRAM(s) Oral every 6 hours PRN Temp greater or equal to 38C (100.4F), Mild Pain (1 - 3)  dextrose Oral Gel 15 Gram(s) Oral once PRN Blood Glucose LESS THAN 70 milliGRAM(s)/deciliter  dextrose Oral Gel 15 Gram(s) Oral once PRN Blood Glucose LESS THAN 70 milliGRAM(s)/deciliter  ondansetron Injectable 4 milliGRAM(s) IV Push every 8 hours PRN Nausea and/or Vomiting      CAPILLARY BLOOD GLUCOSE      POCT Blood Glucose.: 163 mg/dL (03 Apr 2022 21:41)  POCT Blood Glucose.: 250 mg/dL (03 Apr 2022 19:56)  POCT Blood Glucose.: 187 mg/dL (03 Apr 2022 16:44)  POCT Blood Glucose.: 344 mg/dL (03 Apr 2022 12:12)  POCT Blood Glucose.: 221 mg/dL (03 Apr 2022 08:14)    I&O's Summary    03 Apr 2022 07:01  -  04 Apr 2022 07:00  --------------------------------------------------------  IN: 1430 mL / OUT: 1452 mL / NET: -22 mL        PHYSICAL EXAM:  Vital Signs Last 24 Hrs  T(C): 36.3 (04 Apr 2022 04:40), Max: 37.1 (03 Apr 2022 20:52)  T(F): 97.4 (04 Apr 2022 04:40), Max: 98.7 (03 Apr 2022 20:52)  HR: 77 (04 Apr 2022 04:40) (77 - 88)  BP: 129/67 (04 Apr 2022 04:40) (129/67 - 143/77)  BP(mean): --  RR: 18 (04 Apr 2022 04:40) (18 - 18)  SpO2: 98% (04 Apr 2022 04:40) (98% - 99%)  CONSTITUTIONAL: alert, comfortably laying in bed, not in acute distress, conversant, hypophonic  EYES: No conjunctival or scleral injection, non-icteric;   ENMT: No external nasal lesions; MMM  NECK: Trachea midline without palpable neck mass; thyroid not enlarged and non-tender  RESPIRATORY: Breathing comfortably, speaking in full sentences, not using accessory mm to breathe, no w/r/r  CARDIOVASCULAR: +S1S2, RRR, no M/G/R; pedal pulses full and symmetric; no lower extremity edema  GASTROINTESTINAL: No palpable masses or tenderness, +BS throughout, no rebound/guarding; no hernia palpated, colostomy in place with stool output, pink without erythema.   LYMPHATIC: No cervical LAD or tenderness  NEUROLOGIC: nonfocal, moves all extremities spontaneously,   PSYCHIATRIC: A+O x 3; mood and affect appropriate; appropriate insight and judgment       LABS:                        10.4   7.29  )-----------( 309      ( 04 Apr 2022 06:39 )             34.4     04-03    137  |  100  |  10  ----------------------------<  177<H>  4.5   |  31  |  0.40<L>    Ca    8.6      03 Apr 2022 07:35  Phos  1.3     04-03  Mg     1.7     04-03    TPro  6.0  /  Alb  2.6<L>  /  TBili  0.3  /  DBili  x   /  AST  39  /  ALT  67<H>  /  AlkPhos  92  04-03              Culture - Blood (collected 01 Apr 2022 14:38)  Source: .Blood Blood  Preliminary Report (02 Apr 2022 15:05):    No growth to date.    Culture - Blood (collected 01 Apr 2022 14:38)  Source: .Blood Blood  Preliminary Report (02 Apr 2022 15:05):    No growth to date.        RADIOLOGY & ADDITIONAL TESTS:  Results Reviewed:   Imaging Personally Reviewed:  Electrocardiogram Personally Reviewed:    COORDINATION OF CARE:  Care Discussed with Consultants/Other Providers [Y/N]:  Prior or Outpatient Records Reviewed [Y/N]:

## 2022-04-04 NOTE — PROGRESS NOTE ADULT - PROBLEM SELECTOR PLAN 10
- Started after extubation at [facility]  - ENT eval, appreciate assistance: laryngoscope at bedside with diffuse yellow patches of debris ?thrush  - diflucan 200 mg IV first dose, then diflucan 100 mg IV x6 days then ENT will rescope after treatment (), increased to 200 mg IV per ID recs  - FEES scan: pureed diet and thin liquids, continued ST for dysphagia rehab - patient hypoglycemic to low 60s on 3/30, likely iso sepsis, adding premeal, along with decreasing methylpred to 4 mg, s/p 0.5 A d50  - C/w methylprednisolone 8 PO daily  - C/w Lantus 14 and Admelog 5 TID  - Monitor FS

## 2022-04-04 NOTE — PROGRESS NOTE ADULT - CONVERSATION DETAILS
Discussed on admission regarding progressive lung dysfunction as well as pneumonia. Per patient and family, would not want intubation unless for reversible cause.

## 2022-04-04 NOTE — PROGRESS NOTE ADULT - ASSESSMENT
71F PMHx tracheobronchomalacia s/p Tracheobronchoplasty, Bronchiectasis, Severe Allergic Asthma, Adrenal Insuffiencey, DM2, Colorectal cancer s/p total colectomy now with colostomy, PAF on Eliquis admitted with severe sepsis and acute hypoxic respiratory failure possibly from pneumonia and concomitant bronchiectasis exacerbation. 71F PMHx tracheobronchomalacia s/p Tracheobronchoplasty, Bronchiectasis, Severe Allergic Asthma, Adrenal Insuffiencey, DM2, Colorectal cancer s/p total colectomy now with colostomy, PAF on Eliquis admitted with severe sepsis and acute hypoxic respiratory failure possibly from pneumonia and concomitant bronchiectasis exacerbation, now improved with antibiotics.

## 2022-04-04 NOTE — PROGRESS NOTE ADULT - PROBLEM SELECTOR PLAN 7
- Last A1c 7.8. Pt uses lantus 15 u qhs and Novolog varying dose depending on sugar, however was unable to elaborate on a usual dose of Novolog. Has been eating pureed diet due to weakness since last hospital stay  - Lantus 10 + Admelog 3 TID  - low dose ISS  - Monitor fingersticks for goal 140-180 - EKG showing sinus tachycardia, likely in setting of infection and pulmonary pathology  - Cont Eliquis 5 mg BID  - Cont home mexiletine 200 mg TID

## 2022-04-04 NOTE — PROGRESS NOTE ADULT - PROBLEM SELECTOR PLAN 3
- Multifactorial in setting of pneumonia, bronchiectasis exacerbation, likely asthma exacerbation. PE on differential however she has been on therapeutic AC for afib, patient has contrast to allergy with SOB as reaction. Unclear history of COPD, states her sister smokes and she has secondhand exposure, however COPD diagnosis was never fully established however she does have known severe persistent asthma. Discharged to rehab on O2, unclear how much but not on O2 usually at home. +MRSA pneumonia  - CT chest 3/29: worsening extensive bronchial impaction and tree in bud nodularity with new right lower lobe consolidation iso bronchiectasis. diffuse bronchial mucoid impaction  - Wean O2 as tolerated  - c/w methylprednisolone 8 mg qd   - aggressive pulmonary toilet hygiene: chest vest, acapella, incentive spirometry, duonebs q6, hypertonic sal q12  - pulmonary Dr. Allison following - patient is s/p colostomy after colon cancer s/p total colectomy. now with decreased colostomy output on 4/2.  - reportedly with constipation after holding home lactulose, now improved  - Output noted from ostomy for first time in 2 days - small solid clumps, now pasty  - c/w home lactulose  - c/w miralax and senna  - serial abdominal exam  - can consider GI series if without resolution

## 2022-04-04 NOTE — PROGRESS NOTE ADULT - SUBJECTIVE AND OBJECTIVE BOX
CHIEF COMPLAINT:  f/up sob, chronic resp failure, TBM, severe asthma, allergic rhinitis-better -less cough, voice better and slightly stronger    Interval Events: ID-cefepime/vanco/fluconazole in place    REVIEW OF SYSTEMS:  Constitutional: No fevers or chills. No weight loss. + fatigue or generalized malaise.  Eyes: No itching or discharge from the eyes  ENT: No ear pain. No ear discharge. No nasal congestion. No post nasal drip. No epistaxis. No throat pain. No sore throat. No difficulty swallowing.   CV: No chest pain. No palpitations. No lightheadedness or dizziness.   Resp: No dyspnea at rest. No dyspnea on exertion. No orthopnea. No wheezing. + cough. No stridor. No sputum production. No chest pain with respiration.  GI: No nausea. No vomiting. No diarrhea.  MSK: No joint pain or pain in any extremities  Integumentary: No skin lesions. No pedal edema.  Neurological: + gross motor weakness. No sensory changes.  [+] All other systems negative  [ ] Unable to assess ROS because ________    OBJECTIVE:  ICU Vital Signs Last 24 Hrs  T(C): 36.7 (04 Apr 2022 04:40), Max: 37.1 (03 Apr 2022 20:52)  T(F): 98 (04 Apr 2022 04:40), Max: 98.7 (03 Apr 2022 20:52)  HR: 82 (04 Apr 2022 04:40) (80 - 88)  BP: 112/67 (04 Apr 2022 04:40) (112/67 - 143/77)  BP(mean): --  ABP: --  ABP(mean): --  RR: 18 (04 Apr 2022 04:40) (18 - 18)  SpO2: 100% (04 Apr 2022 04:40) (98% - 100%)        04-02 @ 07:01  -  04-03 @ 07:00  --------------------------------------------------------  IN: 365 mL / OUT: 1325 mL / NET: -960 mL    04-03 @ 07:01 - 04-04 @ 05:14  --------------------------------------------------------  IN: 1080 mL / OUT: 1052 mL / NET: 28 mL      CAPILLARY BLOOD GLUCOSE      POCT Blood Glucose.: 163 mg/dL (03 Apr 2022 21:41)      PHYSICAL EXAM: NAD on NC O2  General: Awake, alert, oriented X 3.   HEENT: Atraumatic, normocephalic.                 Mallampatti Grade 2                No nasal congestion.                No tonsillar or pharyngeal exudates.  Lymph Nodes: No palpable lymphadenopathy  Neck: No JVD. No carotid bruit.   Respiratory: Normal chest expansion                         Normal percussion                         Normal and equal air entry                         mild exp wheeze,no rhonchi or rales.  Cardiovascular: S1 S2 normal. No murmurs, rubs or gallops.   Abdomen: Soft, non-tender, non-distended. No organomegaly. Normoactive bowel sounds.  Extremities: Warm to touch. Peripheral pulse palpable. No pedal edema.   Skin: No rashes or skin lesions-decubs noted  Neurological: Motor and sensory examination equal and normal in all four extremities.  Psychiatry: Appropriate mood and affect.    HOSPITAL MEDICATIONS:  MEDICATIONS  (STANDING):  albuterol/ipratropium for Nebulization 3 milliLiter(s) Nebulizer every 6 hours  apixaban 5 milliGRAM(s) Oral every 12 hours  ascorbic acid 500 milliGRAM(s) Oral daily  atorvastatin 20 milliGRAM(s) Oral at bedtime  chlorhexidine 2% Cloths 1 Application(s) Topical <User Schedule>  dextrose 5%. 1000 milliLiter(s) (50 mL/Hr) IV Continuous <Continuous>  dextrose 5%. 1000 milliLiter(s) (50 mL/Hr) IV Continuous <Continuous>  dextrose 5%. 1000 milliLiter(s) (100 mL/Hr) IV Continuous <Continuous>  dextrose 5%. 1000 milliLiter(s) (100 mL/Hr) IV Continuous <Continuous>  dextrose 50% Injectable 25 Gram(s) IV Push once  dextrose 50% Injectable 25 Gram(s) IV Push once  dextrose 50% Injectable 12.5 Gram(s) IV Push once  dextrose 50% Injectable 25 Gram(s) IV Push once  dextrose 50% Injectable 25 Gram(s) IV Push once  dextrose 50% Injectable 12.5 Gram(s) IV Push once  fluconAZOLE IVPB 200 milliGRAM(s) IV Intermittent every 24 hours  glucagon  Injectable 1 milliGRAM(s) IntraMuscular once  glucagon  Injectable 1 milliGRAM(s) IntraMuscular once  influenza  Vaccine (HIGH DOSE) 0.7 milliLiter(s) IntraMuscular once  insulin glargine Injectable (LANTUS) 14 Unit(s) SubCutaneous at bedtime  insulin lispro (ADMELOG) corrective regimen sliding scale   SubCutaneous three times a day before meals  insulin lispro (ADMELOG) corrective regimen sliding scale   SubCutaneous at bedtime  insulin lispro Injectable (ADMELOG) 7 Unit(s) SubCutaneous three times a day before meals  lactulose Syrup 15 Gram(s) Oral daily  methylPREDNISolone 8 milliGRAM(s) Oral daily  mexiletine 200 milliGRAM(s) Oral three times a day  multivitamin 1 Tablet(s) Oral daily  pantoprazole    Tablet 40 milliGRAM(s) Oral before breakfast  polyethylene glycol 3350 17 Gram(s) Oral two times a day  pregabalin 150 milliGRAM(s) Oral two times a day  senna 2 Tablet(s) Oral at bedtime  sodium chloride 7% Inhalation 4 milliLiter(s) Inhalation every 12 hours  vancomycin  IVPB 1000 milliGRAM(s) IV Intermittent every 12 hours  zinc sulfate 220 milliGRAM(s) Oral daily    MEDICATIONS  (PRN):  acetaminophen     Tablet .. 975 milliGRAM(s) Oral every 6 hours PRN Temp greater or equal to 38C (100.4F), Mild Pain (1 - 3)  dextrose Oral Gel 15 Gram(s) Oral once PRN Blood Glucose LESS THAN 70 milliGRAM(s)/deciliter  dextrose Oral Gel 15 Gram(s) Oral once PRN Blood Glucose LESS THAN 70 milliGRAM(s)/deciliter  ondansetron Injectable 4 milliGRAM(s) IV Push every 8 hours PRN Nausea and/or Vomiting      LABS:                        10.0   7.33  )-----------( 303      ( 03 Apr 2022 11:00 )             32.7     04-03    137  |  100  |  10  ----------------------------<  177<H>  4.5   |  31  |  0.40<L>    Ca    8.6      03 Apr 2022 07:35  Phos  1.3     04-03  Mg     1.7     04-03    TPro  6.0  /  Alb  2.6<L>  /  TBili  0.3  /  DBili  x   /  AST  39  /  ALT  67<H>  /  AlkPhos  92  04-03              MICROBIOLOGY:     RADIOLOGY: < from: CT Chest No Cont (03.28.22 @ 21:49) >  Lungs/Airways/Pleura: The central airways are patent. No pleural   effusion. Since 2/9/2022 chest CT, overall worsening diffuse bronchial   mucoid impaction, groundglass and tree-in-bud nodularity involving the   right greater than left lung on a background of bronchiectasis and   bronchial wall thickening, with new focal consolidation at the posterior   basal right lower lobe.    Mediastinum/Lymph nodes: No thoracic adenopathy.    Heart and Vessels: Right chest wall port with tip terminating in the   distal SVC. Mid ascending aorta measures 4 cm. The pulmonary artery   measures 3.3 cm. The heart is normal in size. Mild coronary arterial   calcification is present. Aortic valvular and mitral annular   calcification also noted. No pericardial effusion.    Upper Abdomen: Stable cystic nodules arising from the pancreatic tail   measuring up to 2.1 cm.    Osseous structures and Soft Tissues: Stable numerous scattered sclerotic   lesions throughout the thoracolumbar spine since 2016, likely bone   islands.    IMPRESSION:  Compared to 2/9/2022 chest CT, worsening extensive bronchial impaction   and tree-in-bud nodularity with new right lower lobe consolidation on a   background of bronchiectasis.    < end of copied text >    [ ] Reviewed and interpreted by me    Point of Care Ultrasound Findings:    PFT:    EKG:

## 2022-04-04 NOTE — PROGRESS NOTE ADULT - SUBJECTIVE AND OBJECTIVE BOX
Patient is a 71y old  Female who presents with a chief complaint of sepsis (04 Apr 2022 07:06)    Being followed by ID for respiratory infection        Interval history:  pt still weak  productive cough - still fairly thick mucus - tan colored  No other acute events        PAST MEDICAL & SURGICAL HISTORY:  Atrial fibrillation  paroxysmal, on eliquis    Diabetes  Type 2    COPD (chronic obstructive pulmonary disease)    Adrenal insufficiency  Medrol daily for over 50 years    Aortic insufficiency  moderate AR on echo 5/3/2018    Pelvic fracture    Asthma    Tracheobronchomalacia  diagnosed 2015, s/p bronchial thermoplasty 2016 (Dr Zapien); recent bronchoscopy 6/5/2018 revealed no evidence of tracheobronchomalacia in trachea or bronchial tubes    Colorectal cancer  4/2018- last treatment , chemo and radiation    Rectal bleeding    Seizure  x 1 1/7/18    DVT (deep venous thrombosis)  15-20 years ago, took coumadin    TIA (transient ischemic attack)  multiple, last 5 years ago - presents as right-sided weakness    History of partial hysterectomy  30 years ago - fibroids    H/O total knee replacement, bilateral  5 years ago    History of sinus surgery  multiple sinus surgeries    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    H/O pelvic surgery  5 years ago - s/p fracture    History of tracheomalacia  2015 - attempted tracheal stenting ([facility])- course complicated by obstruction, respiratory failure, multiple CPR attempts -  stent discontinued; 10/20/2016 Tracheobronchoplasty (Prolene Mesh) performed at [facility] by Dr Zapien    S/P bronchoscopy  6/5/2018 - [place] [facility] (Dr Zapien) no evidence of tracheobronchomalacia in trachea or bronchial tubes    Rectal bleeding  exam under anesthesia ([facility]) 2/2018      Allergies    aspirin (Short breath)  Avelox (Short breath; Pruritus)  codeine (Short breath)  Dilaudid (Short breath)  iodine (Short breath; Swelling)  shellfish (Anaphylaxis)  tetanus toxoid (Short breath)  Valium (Short breath)    Intolerances      Antimicrobials:    fluconAZOLE IVPB 200 milliGRAM(s) IV Intermittent every 24 hours  vancomycin  IVPB 1000 milliGRAM(s) IV Intermittent every 12 hours    MEDICATIONS  (STANDING):  albuterol/ipratropium for Nebulization 3 milliLiter(s) Nebulizer every 6 hours  apixaban 5 milliGRAM(s) Oral every 12 hours  ascorbic acid 500 milliGRAM(s) Oral daily  atorvastatin 20 milliGRAM(s) Oral at bedtime  chlorhexidine 2% Cloths 1 Application(s) Topical <User Schedule>  dextrose 5%. 1000 milliLiter(s) (50 mL/Hr) IV Continuous <Continuous>  dextrose 5%. 1000 milliLiter(s) (100 mL/Hr) IV Continuous <Continuous>  dextrose 5%. 1000 milliLiter(s) (50 mL/Hr) IV Continuous <Continuous>  dextrose 5%. 1000 milliLiter(s) (100 mL/Hr) IV Continuous <Continuous>  dextrose 50% Injectable 25 Gram(s) IV Push once  dextrose 50% Injectable 12.5 Gram(s) IV Push once  dextrose 50% Injectable 25 Gram(s) IV Push once  dextrose 50% Injectable 25 Gram(s) IV Push once  dextrose 50% Injectable 12.5 Gram(s) IV Push once  dextrose 50% Injectable 25 Gram(s) IV Push once  fluconAZOLE IVPB 200 milliGRAM(s) IV Intermittent every 24 hours  glucagon  Injectable 1 milliGRAM(s) IntraMuscular once  glucagon  Injectable 1 milliGRAM(s) IntraMuscular once  influenza  Vaccine (HIGH DOSE) 0.7 milliLiter(s) IntraMuscular once  insulin glargine Injectable (LANTUS) 14 Unit(s) SubCutaneous at bedtime  insulin lispro (ADMELOG) corrective regimen sliding scale   SubCutaneous three times a day before meals  insulin lispro (ADMELOG) corrective regimen sliding scale   SubCutaneous at bedtime  insulin lispro Injectable (ADMELOG) 7 Unit(s) SubCutaneous three times a day before meals  lactulose Syrup 15 Gram(s) Oral daily  methylPREDNISolone 8 milliGRAM(s) Oral daily  mexiletine 200 milliGRAM(s) Oral three times a day  multivitamin 1 Tablet(s) Oral daily  pantoprazole    Tablet 40 milliGRAM(s) Oral before breakfast  polyethylene glycol 3350 17 Gram(s) Oral two times a day  pregabalin 150 milliGRAM(s) Oral two times a day  senna 2 Tablet(s) Oral at bedtime  sodium chloride 7% Inhalation 4 milliLiter(s) Inhalation every 12 hours  vancomycin  IVPB 1000 milliGRAM(s) IV Intermittent every 12 hours  zinc sulfate 220 milliGRAM(s) Oral daily      Vital Signs Last 24 Hrs  T(C): 37.4 (04-04-22 @ 16:31), Max: 37.4 (04-04-22 @ 16:31)  T(F): 99.3 (04-04-22 @ 16:31), Max: 99.3 (04-04-22 @ 16:31)  HR: 97 (04-04-22 @ 16:31) (77 - 97)  BP: 126/60 (04-04-22 @ 16:31) (126/60 - 133/72)  BP(mean): --  RR: 18 (04-04-22 @ 16:31) (18 - 18)  SpO2: 94% (04-04-22 @ 16:31) (94% - 99%)    Physical Exam:    Constitutional weak nebulizer on     HEENT PERRLA EOMI,No pallor or icterus    No oral exudate or erythema    Neck supple no JVD or LN    Chest scattered rhonchi    CVS  S1 S2     Abd soft BS normal No tenderness    Ext No cyanosis clubbing or edema    IV site no erythema tenderness or discharge    Joints no swelling or LOM    CNS AAO X 3 no focal    sacral decubitus     Lab Data:                          10.4   7.29  )-----------( 309      ( 04 Apr 2022 06:39 )             34.4       04-04    139  |  100  |  10  ----------------------------<  191<H>  4.7   |  29  |  0.39<L>    Ca    8.7      04 Apr 2022 06:38  Phos  2.5     04-04  Mg     1.8     04-04    TPro  6.1  /  Alb  2.6<L>  /  TBili  0.3  /  DBili  x   /  AST  30  /  ALT  63<H>  /  AlkPhos  103  04-04      < from: 12 Lead ECG (03.28.22 @ 16:44) >    Ventricular Rate 113 BPM    Atrial Rate 113 BPM    P-R Interval 196 ms    QRS Duration 116 ms    Q-T Interval 332 ms    QTC Calculation(Bazett) 455 ms    P Axis 81 degrees    R Axis -9 degrees    T Axis 128 degrees    Diagnosis Line SINUS TACHYCARDIA  INCOMPLETE RIGHT BUNDLE BRANCH BLOCK  MINIMAL VOLTAGE CRITERIA FOR LVH, MAY BE NORMAL VARIANT ( Kentrell product )  SEPTAL INFARCT , NEW  Consider right ventricular involvement in acute inferior infarct  ABNORMAL ECG  WHEN COMPARED WITH ECG OF 05-DEC-2021 10:32,  SIGNIFICANT CHANGES HAVE OCCURRED  new septal infarct and IRBBB  Confirmed by AMERICA Carr Zlata [id number]) on 3/29/2022 5:57:32 PM    < end of copied text >      .Blood Blood  04-01-22   No growth to date.  --  --      .Blood Blood  03-30-22   Growth in aerobic bottle: Staphylococcus epidermidis  Coag Negative Staphylococcus  Single set isolate, possible contaminant. Contact  Microbiology if susceptibility testing clinically  indicated.  ***Blood Panel PCR results on this specimen are available  approximately 3 hours after the Gram stain result.***  Gram stain, PCR, and/or culture results may not always  correspond due to difference in methodologies.  ************************************************************  This PCR assay was performed by multiplex PCR. This  Assay tests for 66 bacterial and resistance gene targets.  Please refer to the [facility] test directory  at [email or web address] for details.  --  Blood Culture PCR      .Sputum Sputum  03-29-22   Numerous Methicillin Resistant Staphylococcus aureus  Normal Respiratory Jessica absent  --  Methicillin resistant Staphylococcus aureus      Clean Catch Clean Catch (Midstream)  03-28-22   10,000 - 49,000 CFU/mL Candida glabrata "Susceptibilities not performed"  --  --      .Blood Blood-Peripheral  03-28-22   No Growth Final  --  --      < from: CT Chest No Cont (03.28.22 @ 21:49) >  ACC: [id number] EXAM:  CT CHEST                          PROCEDURE DATE:  03/28/2022          INTERPRETATION:  INDICATION: Sepsis, AHRF, bronchiectasis. History of   anal cancer relapse s/p surgical resection 2018.    TECHNIQUE: A volumetric CT acquisition of the chest was obtained from the   thoracic inlet to the upper abdomen without the use of intravenous   contrast. Coronal and sagittal reconstructed images are provided.    COMPARISON: Chest CT 2/9/2022    FINDINGS:    Lungs/Airways/Pleura: The central airways are patent. No pleural   effusion. Since 2/9/2022 chest CT, overall worsening diffuse bronchial   mucoid impaction, groundglass and tree-in-bud nodularity involving the   right greater than left lung on a background of bronchiectasis and   bronchial wall thickening, with new focal consolidation at the posterior   basal right lower lobe.    Mediastinum/Lymph nodes: No thoracic adenopathy.    Heart and Vessels: Right chest wall port with tip terminating in the   distal SVC. Mid ascending aorta measures 4 cm. The pulmonary artery   measures 3.3 cm. The heart is normal in size. Mild coronary arterial   calcification is present. Aortic valvular and mitral annular   calcification also noted. No pericardial effusion.    Upper Abdomen: Stable cystic nodules arising from the pancreatic tail   measuring up to 2.1 cm.    Osseous structures and Soft Tissues: Stable numerous scattered sclerotic   lesions throughout the thoracolumbar spine since 2016, likely bone   islands.    IMPRESSION:  Compared to 2/9/2022 chest CT, worsening extensive bronchial impaction   and tree-in-bud nodularity with new right lower lobe consolidation on a   background of bronchiectasis.    --- End of Report ---      < end of copied text >            Vancomycin Level, Trough: 16.2 ug/mL (04-03-22 @ 22:55)      WBC Count: 7.29 (04-04-22 @ 06:39)  WBC Count: 7.33 (04-03-22 @ 11:00)  WBC Count: 4.87 (04-02-22 @ 05:34)  WBC Count: 5.35 (04-01-22 @ 07:34)  WBC Count: 6.91 (03-31-22 @ 07:38)  WBC Count: 7.42 (03-30-22 @ 07:14)  WBC Count: 8.89 (03-29-22 @ 09:41)

## 2022-04-04 NOTE — PROGRESS NOTE ADULT - PROBLEM SELECTOR PLAN 5
- Likely exacerbation in setting of sepsis. S/p tracheoplasty with residual frequent mucus production  - Cont IV antibiotics with Pseudomonal coverage for likely acute exacerbation  - Cont home steroids for severe persistent asthma and adrenal insufficiency  - Pt uses nebulizer treatment at home, duonebs q6h standing. ensure patient is sitting upright for all inhaled meds due to hx of tracheobronchomalacia  - Cont Spiriva  - Incentive spirometry  - Pulmonary (Dr. Allison) following - Not hypotensive, however low threshold for stress dose steroids if hypotensive given severe sepsis  - Cont home steroids

## 2022-04-04 NOTE — PROGRESS NOTE ADULT - PROBLEM SELECTOR PLAN 4
- Multiple episodes of vomiting. Pt reported constipation at rehab and was on lactulose with resolution of constipation. Differential includes C diff given recent Abx use and hospital stay however abdominal exam benign. Possibly in setting of gastroparesis  - Hold C diff testing unless abdominal exam becomes concerning or persistent diarrhea  - PPI 40 mg PO daily - Likely exacerbation in setting of sepsis. S/p tracheoplasty with residual frequent mucus production  - Cont IV antibiotics with Pseudomonal coverage for likely acute exacerbation  - Cont home steroids for severe persistent asthma and adrenal insufficiency  - Pt uses nebulizer treatment at home, duonebs q6h standing. ensure patient is sitting upright for all inhaled meds due to hx of tracheobronchomalacia  - Cont Spiriva  - Incentive spirometry  - Pulmonary (Dr. Allison) following

## 2022-04-04 NOTE — PROGRESS NOTE ADULT - PROBLEM SELECTOR PLAN 11
- patient hypoglycemic to low 60s on 3/30, likely iso sepsis, adding premeal, along with decreasing methylpred to 4 mg, s/p 0.5 A d50  - C/w methylprednisolone 8 PO daily  - C/w Lantus 14 and Admelog 5 TID  - Monitor FS DVT: Eliquis  Diet: pureed, thin liquids, consistent carb diet, glucerna shake 3/day, zinc, MVI, and vitamin C.  speech/swallow eval passed, nutrition consult, appreciate recs  PT: [facility]  LDA: colostomy, PIV  Dispo: [facility] pending completion of abx regimen.   Code: Full code DVT: Eliquis  Diet: pureed, thin liquids, consistent carb diet, glucerna shake 3/day, zinc, MVI, and vitamin C.  speech/swallow eval passed, nutrition consult, appreciate recs  PT: [facility]  LDA: colostomy, PIV  Dispo: [facility] pending completion of abx regimen. STAR patient will establish follow up appt with pulm  Code: Full code

## 2022-04-04 NOTE — PROGRESS NOTE ADULT - PROBLEM SELECTOR PLAN 6
- Not hypotensive, however low threshold for stress dose steroids if hypotensive given severe sepsis  - Cont home steroids - Last A1c 7.8. Pt uses lantus 15 u qhs and Novolog varying dose depending on sugar, however was unable to elaborate on a usual dose of Novolog. Has been eating pureed diet due to weakness since last hospital stay  - Lantus 10 + Admelog 7 TID  - low dose ISS  - Monitor fingersticks for goal 140-180

## 2022-04-04 NOTE — PROGRESS NOTE ADULT - ASSESSMENT
70 yo F with history of tracheobronchomalasia s/p tracheobronchoplasty, bronchiectasis, severe persistent asthma (steroid dependent), adrenal insuffiencey on chronic steroids, DM2, HTN, colorectal cancer s/p total colectomy now with colostomy, with 3 weeks of productive cough, bilous vomiting, shortness of breath and lethargy.   At OSH March 8th and treated for pna, given antibiotics, stay c/b intubation 2/2 hypoxia, and discharged on Friday March 25th   At rehab, SOB, cough, fever, then presents to ED for further care  Note presence of mediport  UA+, yeast cells  RVP neg  Possible thrush seen on laryngoscope--given Fluconazole  3/31 pt with wound care consult: Skin:  Sacral/bilateral buttocks with central deep maroon discoloration and peripheral superficially denuded skin with skin bridges L 5cm X W 5cm xD 0.1cm with pink wound bed, no necrotic tissue, and scant serosanguinous drainage, +TTP  Ongoing fevers  BCX with CoNS 1/4  UA+, with low CFU glabrata  3/29 Sputum CX MRSA  CT with area of consolidation in setting bronchiectasis  Uncertain cause fever--BCX is likely contam; chest imaging possible PNA with MRSA in sputum? Lower suspicion for fungal UTI, but note presence of low CFU glabrata  Continue empiric coverage for now      Overall, Fever, positive culture finding, bandemia, elevated LFTs  concern for pneumonia, MRSA infection     PLAN;   - Vanco 1g q 12  - monitor vancomycin trough and creatinine to avoid nephrotoxicity and ensure efficacy   - s/p cefepime   - Fluconazole 200mg q 24, follow LFTs, Qtc  - F/U fungal studies--fungitell negative, aspergillus pending   - Wound care to sacral wound  - F/U repeat BCXs (I suspect CoNS is contam)  - no fevers since Mach 30th  - repeat sputum culture      Ashley Liz M.D. ,   please reach via teams   If no answer, or after 5PM/ weekends,  then please call  [phone]    Assessment and plan discussed with the primary team .

## 2022-04-05 ENCOUNTER — TRANSCRIPTION ENCOUNTER (OUTPATIENT)
Age: 72
End: 2022-04-05

## 2022-04-05 LAB
ALBUMIN SERPL ELPH-MCNC: 2.7 G/DL — LOW (ref 3.3–5)
ALP SERPL-CCNC: 114 U/L — SIGNIFICANT CHANGE UP (ref 40–120)
ALT FLD-CCNC: 57 U/L — HIGH (ref 10–45)
ANION GAP SERPL CALC-SCNC: 8 MMOL/L — SIGNIFICANT CHANGE UP (ref 5–17)
AST SERPL-CCNC: 54 U/L — HIGH (ref 10–40)
BASOPHILS # BLD AUTO: 0 K/UL — SIGNIFICANT CHANGE UP (ref 0–0.2)
BASOPHILS NFR BLD AUTO: 0 % — SIGNIFICANT CHANGE UP (ref 0–2)
BILIRUB SERPL-MCNC: 0.3 MG/DL — SIGNIFICANT CHANGE UP (ref 0.2–1.2)
BUN SERPL-MCNC: 11 MG/DL — SIGNIFICANT CHANGE UP (ref 7–23)
CALCIUM SERPL-MCNC: 8.9 MG/DL — SIGNIFICANT CHANGE UP (ref 8.4–10.5)
CHLORIDE SERPL-SCNC: 99 MMOL/L — SIGNIFICANT CHANGE UP (ref 96–108)
CO2 SERPL-SCNC: 29 MMOL/L — SIGNIFICANT CHANGE UP (ref 22–31)
CREAT SERPL-MCNC: 0.41 MG/DL — LOW (ref 0.5–1.3)
EGFR: 104 ML/MIN/1.73M2 — SIGNIFICANT CHANGE UP
EOSINOPHIL # BLD AUTO: 0 K/UL — SIGNIFICANT CHANGE UP (ref 0–0.5)
EOSINOPHIL NFR BLD AUTO: 0 % — SIGNIFICANT CHANGE UP (ref 0–6)
GLUCOSE BLDC GLUCOMTR-MCNC: 219 MG/DL — HIGH (ref 70–99)
GLUCOSE BLDC GLUCOMTR-MCNC: 220 MG/DL — HIGH (ref 70–99)
GLUCOSE BLDC GLUCOMTR-MCNC: 287 MG/DL — HIGH (ref 70–99)
GLUCOSE BLDC GLUCOMTR-MCNC: 306 MG/DL — HIGH (ref 70–99)
GLUCOSE SERPL-MCNC: 259 MG/DL — HIGH (ref 70–99)
HCT VFR BLD CALC: 35.6 % — SIGNIFICANT CHANGE UP (ref 34.5–45)
HGB BLD-MCNC: 10.8 G/DL — LOW (ref 11.5–15.5)
LYMPHOCYTES # BLD AUTO: 0.53 K/UL — LOW (ref 1–3.3)
LYMPHOCYTES # BLD AUTO: 4.4 % — LOW (ref 13–44)
MAGNESIUM SERPL-MCNC: 1.8 MG/DL — SIGNIFICANT CHANGE UP (ref 1.6–2.6)
MANUAL SMEAR VERIFICATION: SIGNIFICANT CHANGE UP
MCHC RBC-ENTMCNC: 22.6 PG — LOW (ref 27–34)
MCHC RBC-ENTMCNC: 30.3 GM/DL — LOW (ref 32–36)
MCV RBC AUTO: 74.5 FL — LOW (ref 80–100)
METAMYELOCYTES # FLD: 0.9 % — HIGH (ref 0–0)
MONOCYTES # BLD AUTO: 0.42 K/UL — SIGNIFICANT CHANGE UP (ref 0–0.9)
MONOCYTES NFR BLD AUTO: 3.5 % — SIGNIFICANT CHANGE UP (ref 2–14)
MYELOCYTES NFR BLD: 1.7 % — HIGH (ref 0–0)
NEUTROPHILS # BLD AUTO: 10.75 K/UL — HIGH (ref 1.8–7.4)
NEUTROPHILS NFR BLD AUTO: 87.7 % — HIGH (ref 43–77)
NEUTS BAND # BLD: 0.9 % — SIGNIFICANT CHANGE UP (ref 0–8)
NRBC # BLD: 4 /100 — HIGH (ref 0–0)
PHOSPHATE SERPL-MCNC: 2.6 MG/DL — SIGNIFICANT CHANGE UP (ref 2.5–4.5)
PLAT MORPH BLD: NORMAL — SIGNIFICANT CHANGE UP
PLATELET # BLD AUTO: 365 K/UL — SIGNIFICANT CHANGE UP (ref 150–400)
POTASSIUM SERPL-MCNC: 5.7 MMOL/L — HIGH (ref 3.5–5.3)
POTASSIUM SERPL-SCNC: 5.7 MMOL/L — HIGH (ref 3.5–5.3)
PROMYELOCYTES # FLD: 0.9 % — HIGH (ref 0–0)
PROT SERPL-MCNC: 6.4 G/DL — SIGNIFICANT CHANGE UP (ref 6–8.3)
RBC # BLD: 4.78 M/UL — SIGNIFICANT CHANGE UP (ref 3.8–5.2)
RBC # FLD: 19 % — HIGH (ref 10.3–14.5)
RBC BLD AUTO: SIGNIFICANT CHANGE UP
SODIUM SERPL-SCNC: 136 MMOL/L — SIGNIFICANT CHANGE UP (ref 135–145)
WBC # BLD: 12.13 K/UL — HIGH (ref 3.8–10.5)
WBC # FLD AUTO: 12.13 K/UL — HIGH (ref 3.8–10.5)

## 2022-04-05 PROCEDURE — 93010 ELECTROCARDIOGRAM REPORT: CPT

## 2022-04-05 PROCEDURE — 99232 SBSQ HOSP IP/OBS MODERATE 35: CPT

## 2022-04-05 PROCEDURE — 99232 SBSQ HOSP IP/OBS MODERATE 35: CPT | Mod: 25

## 2022-04-05 PROCEDURE — 99233 SBSQ HOSP IP/OBS HIGH 50: CPT

## 2022-04-05 PROCEDURE — 99233 SBSQ HOSP IP/OBS HIGH 50: CPT | Mod: GC

## 2022-04-05 PROCEDURE — 31575 DIAGNOSTIC LARYNGOSCOPY: CPT

## 2022-04-05 RX ADMIN — Medication 8 MILLIGRAM(S): at 04:47

## 2022-04-05 RX ADMIN — Medication 3 MILLILITER(S): at 17:27

## 2022-04-05 RX ADMIN — Medication 3: at 12:26

## 2022-04-05 RX ADMIN — Medication 4: at 17:30

## 2022-04-05 RX ADMIN — Medication 1 TABLET(S): at 12:34

## 2022-04-05 RX ADMIN — Medication 3 MILLILITER(S): at 12:33

## 2022-04-05 RX ADMIN — MEXILETINE HYDROCHLORIDE 200 MILLIGRAM(S): 150 CAPSULE ORAL at 15:12

## 2022-04-05 RX ADMIN — Medication 7 UNIT(S): at 17:31

## 2022-04-05 RX ADMIN — Medication 150 MILLIGRAM(S): at 04:47

## 2022-04-05 RX ADMIN — CHLORHEXIDINE GLUCONATE 1 APPLICATION(S): 213 SOLUTION TOPICAL at 13:11

## 2022-04-05 RX ADMIN — ZINC SULFATE TAB 220 MG (50 MG ZINC EQUIVALENT) 220 MILLIGRAM(S): 220 (50 ZN) TAB at 12:34

## 2022-04-05 RX ADMIN — Medication 7 UNIT(S): at 12:27

## 2022-04-05 RX ADMIN — MEXILETINE HYDROCHLORIDE 200 MILLIGRAM(S): 150 CAPSULE ORAL at 21:24

## 2022-04-05 RX ADMIN — PANTOPRAZOLE SODIUM 40 MILLIGRAM(S): 20 TABLET, DELAYED RELEASE ORAL at 04:48

## 2022-04-05 RX ADMIN — Medication 3 MILLILITER(S): at 04:47

## 2022-04-05 RX ADMIN — Medication 7 UNIT(S): at 08:46

## 2022-04-05 RX ADMIN — INSULIN GLARGINE 14 UNIT(S): 100 INJECTION, SOLUTION SUBCUTANEOUS at 21:25

## 2022-04-05 RX ADMIN — SODIUM CHLORIDE 4 MILLILITER(S): 9 INJECTION INTRAMUSCULAR; INTRAVENOUS; SUBCUTANEOUS at 17:28

## 2022-04-05 RX ADMIN — MEXILETINE HYDROCHLORIDE 200 MILLIGRAM(S): 150 CAPSULE ORAL at 04:47

## 2022-04-05 RX ADMIN — FLUCONAZOLE 100 MILLIGRAM(S): 150 TABLET ORAL at 04:46

## 2022-04-05 RX ADMIN — Medication 150 MILLIGRAM(S): at 18:16

## 2022-04-05 RX ADMIN — APIXABAN 5 MILLIGRAM(S): 2.5 TABLET, FILM COATED ORAL at 17:28

## 2022-04-05 RX ADMIN — Medication 500 MILLIGRAM(S): at 12:34

## 2022-04-05 RX ADMIN — Medication 2: at 08:46

## 2022-04-05 RX ADMIN — ATORVASTATIN CALCIUM 20 MILLIGRAM(S): 80 TABLET, FILM COATED ORAL at 21:25

## 2022-04-05 RX ADMIN — APIXABAN 5 MILLIGRAM(S): 2.5 TABLET, FILM COATED ORAL at 04:47

## 2022-04-05 RX ADMIN — SODIUM CHLORIDE 4 MILLILITER(S): 9 INJECTION INTRAMUSCULAR; INTRAVENOUS; SUBCUTANEOUS at 04:47

## 2022-04-05 RX ADMIN — POLYETHYLENE GLYCOL 3350 17 GRAM(S): 17 POWDER, FOR SOLUTION ORAL at 17:26

## 2022-04-05 RX ADMIN — Medication 250 MILLIGRAM(S): at 12:38

## 2022-04-05 NOTE — PROGRESS NOTE ADULT - TIME BILLING
as above: slowly better-no new issues--s/p ENT evaln-thrush--diflucan in place for 7 days (D7)  multifactorial dyspnea-TBM, CB, severe persistent asthma, ? PNA, thrush , debility, anemia, CAD--O2 NC sat above 90%  ?PNA/bronchiectasis-f/up sputum cx-prior pseudomonas- (s/p ID formal evaln-RISHABH Liz/Girish et al)-on cefepime/vanco D8-MRSA and            pseudomonas as well as achromobacter and kleb   agreed with vancomycin to continue ? duration, off cefepime 4/3    TBM-CB/brochiectasis-acapella/vest rx, incentive spirometry--? fob for additional sputum?--utilize vest rx  asthma-medrol 8 mg (chronic dosing) , spiriva/symbicort, duoneb q 6, singulair 10 q hs, hypertonic saline  allergy-claritin/flonase                                 *****dysphonia/VC dysfunction-thrush-ENT evaln/swallow evaln   gerd-ppi  abnormal CT-nodule-f/up 3 months                    cards-on mult rx-to continue  PT-OOB    DVT prophylaxis              Decub care                  ID-vanco for MRSA continues-? duration  DC planning-will need extensive rehab      (all inhaled meds and eating must be done in chair-efficacy and asp. risk)    Eulalio Allison MD-Pulmonary   [phone].

## 2022-04-05 NOTE — PROGRESS NOTE ADULT - PROBLEM SELECTOR PLAN 1
- Febrile, tachycardia, tachypnea, lactate 2.1. Associated AHRF, productive cough. Recent hospital stay with intubation for pneumonia at OSH and intubated, unclear which antibiotics were given and duration of treatment. Likely in setting of pneumonia and bronchiectasis exacerbation. History of sputum Cx +Pseudomonas (Feb 2020) via bronchoscopy. Also with UA grossly positive, however pt without symptoms  - S/p cefepime and vancomycin in ED, 500 cc bolus, MRSA+, RVP neg  - cefepime for Pseudomonas coverage (3/29-4/3)  - vancomycin given recent intubation (3/29 -4/1 ), diflucan (3/30-4/8)  - BCx 3/30 with MRSE in 1/2 bottles, likely contaminant  - UCx <50K candida glabrata  - Sputum Cx +mrsa  - fungitell neg  - aspergillus abs, galactomannan neg  - EKG, monitor QTC interval  - Bcx repeat until cleared - BCx 4/1 NGTD  - vanc trough, prior to every 4th dose  - ID consult, appreciate recs - Febrile, tachycardia, tachypnea, lactate 2.1. Associated AHRF, productive cough. Recent hospital stay with intubation for pneumonia at OSH and intubated, unclear which antibiotics were given and duration of treatment. Likely in setting of pneumonia and bronchiectasis exacerbation. History of sputum Cx +Pseudomonas (Feb 2020) via bronchoscopy. Also with UA grossly positive, however pt without symptoms  - S/p cefepime and vancomycin in ED, 500 cc bolus, MRSA+, RVP neg  - cefepime for Pseudomonas coverage (3/29-4/3)  - vancomycin given recent intubation (3/29 -4/6 ), diflucan (3/30-4/5)  - BCx 3/30 with MRSE in 1/2 bottles, likely contaminant  - UCx <50K candida glabrata  - Sputum Cx +mrsa  - fungitell neg  - aspergillus abs, galactomannan neg  - EKG, monitor QTC interval  - Bcx repeat until cleared - BCx 4/1 NGTD  - vanc trough, prior to every 4th dose  - ID consult, appreciate recs

## 2022-04-05 NOTE — PROGRESS NOTE ADULT - ASSESSMENT
71F PMHx tracheobronchomalacia s/p Tracheobronchoplasty, Bronchiectasis, Severe Allergic Asthma, Adrenal Insuffiencey, DM2, Colorectal cancer s/p total colectomy now with colostomy, PAF on Eliquis admitted with severe sepsis and acute hypoxic respiratory failure possibly from pneumonia and concomitant bronchiectasis exacerbation, now improved with antibiotics.

## 2022-04-05 NOTE — PROGRESS NOTE ADULT - PROBLEM SELECTOR PLAN 10
- patient hypoglycemic to low 60s on 3/30, likely iso sepsis, adding premeal, along with decreasing methylpred to 4 mg, s/p 0.5 A d50  - C/w methylprednisolone 8 PO daily  - C/w Lantus 14 and Admelog 5 TID  - Monitor FS DVT: Eliquis  Diet: pureed, thin liquids, consistent carb diet, glucerna shake 3/day, zinc, MVI, and vitamin C.  speech/swallow eval passed, nutrition consult, appreciate recs  PT: [facility]  LDA: colostomy, PIV  Dispo: [facility] pending completion of abx regimen. STAR patient will establish follow up appt with pulm  Code: Full code

## 2022-04-05 NOTE — DISCHARGE NOTE PROVIDER - NSDCMRMEDTOKEN_GEN_ALL_CORE_FT
apixaban 5 mg oral tablet: 1 tab(s) orally every 12 hours  Crestor 5 mg oral tablet: 1 tab(s) orally once a day (at bedtime)  ipratropium-albuterol 0.5 mg-2.5 mg/3 mLinhalation solution: 3 milliliter(s) inhaled every 6 hours  Lantus 100 units/mL subcutaneous solution: 15 unit(s) subcutaneous once a day (at bedtime)  Lyrica 150 mg oral capsule: 1 cap(s) orally 2 times a day  methylPREDNISolone 4 mg oral tablet: 2 tab(s) orally once a day  mexiletine 200 mg oral capsule: 1 cap(s) orally 3 times a day  NovoLOG 100 units/mL subcutaneous solution: subcutaneous 3 times a day (before meals), dose varies  Spiriva 18 mcg inhalation capsule: 1 cap(s) inhaled once a day   acetaminophen 325 mg oral tablet: 3 tab(s) orally every 6 hours, As needed, Temp greater or equal to 38C (100.4F), Mild Pain (1 - 3)  Admelog 100 units/mL injectable solution: 9 unit(s) injectable 3 times a day  aluminum hydroxide-magnesium hydroxide 200 mg-200 mg/5 mL oral suspension: 30 milliliter(s) orally every 4 hours, As needed, Dyspepsia  apixaban 5 mg oral tablet: 1 tab(s) orally every 12 hours  ascorbic acid 500 mg oral tablet: 1 tab(s) orally once a day  chlorhexidine 2% topical pad: Apply topically to affected area once a day  Crestor 5 mg oral tablet: 1 tab(s) orally once a day (at bedtime)  insulin glargine 100 units/mL subcutaneous solution: 20 unit(s) subcutaneous once a day (at bedtime)  insulin lispro 100 units/mL injectable solution: 1 Unit(s) if Glucose 151 - 200  2 Unit(s) if Glucose 201 - 250  3 Unit(s) if Glucose 251 - 300  4 Unit(s) if Glucose 301 - 350  5 Unit(s) if Glucose 351 - 400  6 Unit(s) if Glucose Greater Than 400  Three times a day before meals  insulin lispro 100 units/mL injectable solution: 0 Unit(s) if Glucose 0 - 250  1 Unit(s) if Glucose 251 - 300  2 Unit(s) if Glucose 301 - 350  3 Unit(s) if Glucose 351 - 400  4 Unit(s) if Glucose Greater Than 400  Subcutaneous at bedtime  ipratropium-albuterol 0.5 mg-2.5 mg/3 mL inhalation solution: 3 milliliter(s) inhaled every 6 hours  lactulose 10 g/15 mL oral syrup: 22.5 milliliter(s) orally once a day  methylPREDNISolone 8 mg oral tablet: 1 tab(s) orally once a day  mexiletine 200 mg oral capsule: 1 cap(s) orally 3 times a day  Multiple Vitamins oral tablet: 1 tab(s) orally once a day  ondansetron 2 mg/mL injectable solution: 2 milliliter(s) injectable every 8 hours, As Needed  pantoprazole 40 mg oral delayed release tablet: 1 tab(s) orally once a day (before a meal)  polyethylene glycol 3350 oral powder for reconstitution: 17 gram(s) orally 2 times a day  pregabalin 150 mg oral capsule: 1 cap(s) orally 2 times a day  senna oral tablet: 2 tab(s) orally once a day (at bedtime)  sodium chloride 7% inhalation solution: 4 milliliter(s) inhaled every 12 hours  Spiriva 18 mcg inhalation capsule: 1 cap(s) inhaled once a day  zinc sulfate 220 mg oral capsule: 1 cap(s) orally once a day

## 2022-04-05 NOTE — PROGRESS NOTE ADULT - ASSESSMENT
70 yo F with history of tracheobronchomalasia s/p tracheobronchoplasty, bronchiectasis, severe persistent asthma (steroid dependent), adrenal insuffiencey on chronic steroids, DM2, HTN, colorectal cancer s/p total colectomy now with colostomy, with 3 weeks of productive cough, bilous vomiting, shortness of breath and lethargy.   At OSH March 8th and treated for pna, given antibiotics, stay c/b intubation 2/2 hypoxia, and discharged on Friday March 25th   At rehab, SOB, cough, fever, then presents to ED for further care  Note presence of mediport  UA+, yeast cells  RVP neg  Possible thrush seen on laryngoscope--given Fluconazole  3/31 pt with wound care consult: Skin:  Sacral/bilateral buttocks with central deep maroon discoloration and peripheral superficially denuded skin with skin bridges L 5cm X W 5cm xD 0.1cm with pink wound bed, no necrotic tissue, and scant serosanguinous drainage, +TTP  Ongoing fevers  BCX with CoNS 1/4  UA+, with low CFU glabrata  3/29 Sputum CX MRSA  CT with area of consolidation in setting bronchiectasis  Uncertain cause fever--BCX is likely contam; chest imaging possible PNA with MRSA in sputum? Lower suspicion for fungal UTI, but note presence of low CFU glabrata  Continue empiric coverage for now      Overall, Fever, positive culture finding, bandemia, elevated LFTs  concern for pneumonia, MRSA infection     PLAN;   - Vanco 1g q 12  - monitor vancomycin trough and creatinine to avoid nephrotoxicity and ensure efficacy   - s/p cefepime   - Fluconazole 200mg q 24, follow LFTs, Qtc  - F/U fungal studies--fungitell negative, aspergillus negative  - Wound care to sacral wound  - F/U repeat BCXs (I suspect CoNS is contam)  - no fevers since Mach 30th  - repeat sputum culture    - day # 9 vancomycin- ? 10 day course  - day # 7 diflucan- would d/c after today   -steroids per pulmonary    Ashley Liz M.D. ,   please reach via teams   If no answer, or after 5PM/ weekends,  then please call  [phone]    Assessment and plan discussed with the primary team .

## 2022-04-05 NOTE — PROGRESS NOTE ADULT - ASSESSMENT
72 yo F admitted w pnma seen 1 week ago for dysphonia found to have thrush. Pt w improvement in voice after being treated w diflucan 70 yo F admitted w pnma seen 1 week ago for dysphonia found to have thrush. Pt w improvement in voice after being treated w diflucan  Laryngoscopy clear of thrush, normal structure and VC mobility

## 2022-04-05 NOTE — PROGRESS NOTE ADULT - SUBJECTIVE AND OBJECTIVE BOX
CHIEF COMPLAINT:  f/up sob, chronic resp failure, TBM, severe asthma, allergic rhinitis-voice stronger and slowly better, no new resp complaints    Interval Events: ID f/up-vanco/fluconazole    REVIEW OF SYSTEMS:  Constitutional: No fevers or chills. No weight loss. + fatigue or generalized malaise.  Eyes: No itching or discharge from the eyes  ENT: No ear pain. No ear discharge. No nasal congestion. No post nasal drip. No epistaxis. No throat pain. No sore throat. No difficulty swallowing.   CV: No chest pain. No palpitations. No lightheadedness or dizziness.   Resp: No dyspnea at rest. + dyspnea on exertion. No orthopnea. No wheezing. + cough. No stridor. No sputum production. No chest pain with respiration.  GI: No nausea. No vomiting. No diarrhea.  MSK: No joint pain or pain in any extremities  Integumentary: No skin lesions. No pedal edema.  Neurological: No gross motor weakness. No sensory changes.  [ +] All other systems negative  [ ] Unable to assess ROS because ________    OBJECTIVE:  ICU Vital Signs Last 24 Hrs  T(C): 36.8 (05 Apr 2022 05:05), Max: 37.4 (04 Apr 2022 16:31)  T(F): 98.2 (05 Apr 2022 05:05), Max: 99.3 (04 Apr 2022 16:31)  HR: 89 (05 Apr 2022 05:05) (82 - 97)  BP: 148/77 (05 Apr 2022 05:05) (121/71 - 148/77)  BP(mean): --  ABP: --  ABP(mean): --  RR: 18 (05 Apr 2022 05:05) (18 - 18)  SpO2: 100% (05 Apr 2022 05:05) (94% - 100%)        04-03 @ 07:01  -  04-04 @ 07:00  --------------------------------------------------------  IN: 1430 mL / OUT: 1452 mL / NET: -22 mL    04-04 @ 07:01 - 04-05 @ 05:24  --------------------------------------------------------  IN: 870 mL / OUT: 340 mL / NET: 530 mL      CAPILLARY BLOOD GLUCOSE      POCT Blood Glucose.: 166 mg/dL (04 Apr 2022 21:07)      PHYSICAL EXAM: NAD in bed on NC O2  General: Awake, alert, oriented X 3.   HEENT: Atraumatic, normocephalic.                 Mallampatti Grade 2                No nasal congestion.                No tonsillar or pharyngeal exudates.  Lymph Nodes: No palpable lymphadenopathy  Neck: No JVD. No carotid bruit.   Respiratory: abnormal chest expansion                         Normal percussion                         Normal and equal air entry                         mild exp wheeze and rhonchi but no rales.  Cardiovascular: S1 S2 normal. No murmurs, rubs or gallops.   Abdomen: Soft, non-tender, non-distended. No organomegaly. Normoactive bowel sounds.  Extremities: Warm to touch. Peripheral pulse palpable. No pedal edema.   Skin: No rashes or skin lesions-except decubs  Neurological: Motor and sensory examination equal and normal in all four extremities.  Psychiatry: Appropriate mood and affect.    HOSPITAL MEDICATIONS:  MEDICATIONS  (STANDING):  albuterol/ipratropium for Nebulization 3 milliLiter(s) Nebulizer every 6 hours  apixaban 5 milliGRAM(s) Oral every 12 hours  ascorbic acid 500 milliGRAM(s) Oral daily  atorvastatin 20 milliGRAM(s) Oral at bedtime  chlorhexidine 2% Cloths 1 Application(s) Topical <User Schedule>  dextrose 5%. 1000 milliLiter(s) (100 mL/Hr) IV Continuous <Continuous>  dextrose 5%. 1000 milliLiter(s) (50 mL/Hr) IV Continuous <Continuous>  dextrose 5%. 1000 milliLiter(s) (50 mL/Hr) IV Continuous <Continuous>  dextrose 5%. 1000 milliLiter(s) (100 mL/Hr) IV Continuous <Continuous>  dextrose 50% Injectable 25 Gram(s) IV Push once  dextrose 50% Injectable 12.5 Gram(s) IV Push once  dextrose 50% Injectable 25 Gram(s) IV Push once  dextrose 50% Injectable 25 Gram(s) IV Push once  dextrose 50% Injectable 12.5 Gram(s) IV Push once  dextrose 50% Injectable 25 Gram(s) IV Push once  fluconAZOLE IVPB 200 milliGRAM(s) IV Intermittent every 24 hours  glucagon  Injectable 1 milliGRAM(s) IntraMuscular once  glucagon  Injectable 1 milliGRAM(s) IntraMuscular once  influenza  Vaccine (HIGH DOSE) 0.7 milliLiter(s) IntraMuscular once  insulin glargine Injectable (LANTUS) 14 Unit(s) SubCutaneous at bedtime  insulin lispro (ADMELOG) corrective regimen sliding scale   SubCutaneous three times a day before meals  insulin lispro (ADMELOG) corrective regimen sliding scale   SubCutaneous at bedtime  insulin lispro Injectable (ADMELOG) 7 Unit(s) SubCutaneous three times a day before meals  lactulose Syrup 15 Gram(s) Oral daily  methylPREDNISolone 8 milliGRAM(s) Oral daily  mexiletine 200 milliGRAM(s) Oral three times a day  multivitamin 1 Tablet(s) Oral daily  pantoprazole    Tablet 40 milliGRAM(s) Oral before breakfast  polyethylene glycol 3350 17 Gram(s) Oral two times a day  pregabalin 150 milliGRAM(s) Oral two times a day  senna 2 Tablet(s) Oral at bedtime  sodium chloride 7% Inhalation 4 milliLiter(s) Inhalation every 12 hours  vancomycin  IVPB 1000 milliGRAM(s) IV Intermittent every 12 hours  zinc sulfate 220 milliGRAM(s) Oral daily    MEDICATIONS  (PRN):  acetaminophen     Tablet .. 975 milliGRAM(s) Oral every 6 hours PRN Temp greater or equal to 38C (100.4F), Mild Pain (1 - 3)  aluminum hydroxide/magnesium hydroxide/simethicone Suspension 30 milliLiter(s) Oral every 4 hours PRN Dyspepsia  dextrose Oral Gel 15 Gram(s) Oral once PRN Blood Glucose LESS THAN 70 milliGRAM(s)/deciliter  dextrose Oral Gel 15 Gram(s) Oral once PRN Blood Glucose LESS THAN 70 milliGRAM(s)/deciliter  ondansetron Injectable 4 milliGRAM(s) IV Push every 8 hours PRN Nausea and/or Vomiting      LABS:                        10.4   7.29  )-----------( 309      ( 04 Apr 2022 06:39 )             34.4     04-04    139  |  100  |  10  ----------------------------<  191<H>  4.7   |  29  |  0.39<L>    Ca    8.7      04 Apr 2022 06:38  Phos  2.5     04-04  Mg     1.8     04-04    TPro  6.1  /  Alb  2.6<L>  /  TBili  0.3  /  DBili  x   /  AST  30  /  ALT  63<H>  /  AlkPhos  103  04-04              MICROBIOLOGY:     RADIOLOGY:  [ ] Reviewed and interpreted by me    Point of Care Ultrasound Findings:    PFT:    EKG:

## 2022-04-05 NOTE — DISCHARGE NOTE PROVIDER - DISCHARGE DIET
Pureed Diet/Nutrition Supplements Consistent Carbohydrate Diabetic Diets/Pureed Diet/Nutrition Supplements

## 2022-04-05 NOTE — DISCHARGE NOTE PROVIDER - NSDCCPTREATMENT_GEN_ALL_CORE_FT
PRINCIPAL PROCEDURE  Procedure: CT chest wo con  Findings and Treatment:   < end of copied text >  Lungs/Airways/Pleura: The central airways are patent. No pleural   effusion. Since 2/9/2022 chest CT, overall worsening diffuse bronchial   mucoid impaction, groundglass and tree-in-bud nodularity involving the   right greater than left lung on a background of bronchiectasis and   bronchial wall thickening, with new focal consolidation at the posterior   basal right lower lobe.  Mediastinum/Lymph nodes: No thoracic adenopathy.  Heart and Vessels: Right chest wall port with tip terminating in the   distal SVC. Mid ascending aorta measures 4 cm. The pulmonary artery   measures 3.3 cm. The heart is normal in size. Mild coronary arterial   calcification is present. Aortic valvular and mitral annular   calcification also noted. No pericardial effusion.  Upper Abdomen: Stable cystic nodules arising from the pancreatic tail   measuring up to 2.1 cm.  Osseous structures and Soft Tissues: Stable numerous scattered sclerotic   lesions throughout the thoracolumbar spine since 2016, likely bone   islands.  IMPRESSION:  Compared to 2/9/2022 chest CT, worsening extensive bronchial impaction   and tree-in-bud nodularity with new right lower lobe consolidation on a   background of bronchiectasis.< from: CT Chest No Cont (03.28.22 @ 21:49) >         PRINCIPAL PROCEDURE  Procedure: CT chest wo con  Findings and Treatment:   < end of copied text >  Lungs/Airways/Pleura: The central airways are patent. No pleural   effusion. Since 2/9/2022 chest CT, overall worsening diffuse bronchial   mucoid impaction, groundglass and tree-in-bud nodularity involving the   right greater than left lung on a background of bronchiectasis and   bronchial wall thickening, with new focal consolidation at the posterior   basal right lower lobe.  Mediastinum/Lymph nodes: No thoracic adenopathy.  Heart and Vessels: Right chest wall port with tip terminating in the   distal SVC. Mid ascending aorta measures 4 cm. The pulmonary artery   measures 3.3 cm. The heart is normal in size. Mild coronary arterial   calcification is present. Aortic valvular and mitral annular   calcification also noted. No pericardial effusion.  Upper Abdomen: Stable cystic nodules arising from the pancreatic tail   measuring up to 2.1 cm.  Osseous structures and Soft Tissues: Stable numerous scattered sclerotic   lesions throughout the thoracolumbar spine since 2016, likely bone   islands.  IMPRESSION:  Compared to 2/9/2022 chest CT, worsening extensive bronchial impaction   and tree-in-bud nodularity with new right lower lobe consolidation on a   background of bronchiectasis.< from: CT Chest No Cont (03.28.22 @ 21:49) >        SECONDARY PROCEDURE  Procedure: Sputum aerobic culture  Findings and Treatment: Culture - Sputum . (03.29.22 @ 06:25)   - Rifampin: S <=1 Should not be used as monotherapy   - Tetra/Doxy: R >8   - Trimethoprim/Sulfamethoxazole: S <=0.5/9.5   - Vancomycin: S 1   Gram Stain:   Few Squamous epithelial cells per low power field   Few polymorphonuclear leukocytes per low power field   Numerous Gram positive cocci in pairs, chains and clusters per oil power   field   Numerous Gram Negative Rods per oil power field   Few Gram Variable Rods per oil power field   - Ampicillin/Sulbactam: R <=8/4   - Cefazolin: R 16   - Clindamycin: R >4   - Erythromycin: R >4   - Gentamicin: S <=1 Should not be used as monotherapy   - Linezolid: S 2   - Oxacillin: R >2   - Penicillin: R >8   Specimen Source: .Sputum Sputum   Culture Results:   Numerous Methicillin Resistant Staphylococcus aureus   Normal Respiratory Jessica absent   Organism Identification: Methicillin resistant Staphylococcus aureus   Organism: Methicillin resistant Staphylococcus aureus   Method Type: GARRETT

## 2022-04-05 NOTE — PROGRESS NOTE ADULT - PROBLEM SELECTOR PLAN 1
-Plan for rescope today  -Diet as tolerated -No further treatment necessary  -Pt should rinse mouth after inhaled steroid use  -Diet as tolerated  -Reconsult ENT as needed -3 more days of diflucan (10 days total)  -Pt should rinse mouth after inhaled steroid use  -Diet as tolerated  -Reconsult ENT as needed

## 2022-04-05 NOTE — PROGRESS NOTE ADULT - PROBLEM SELECTOR PLAN 9
- Started after extubation at [facility]  - ENT eval, appreciate assistance: laryngoscope at bedside with diffuse yellow patches of debris ?thrush. plan to rescope on 4/5  - diflucan 200 mg IV first dose, then diflucan 100 mg IV x6 days then ENT will rescope after treatment (), increased to 200 mg IV per ID recs  - FEES scan: pureed diet and thin liquids, continued ST for dysphagia rehab - Started after extubation at [facility], improved likely 2/2 thrush  - ENT eval, appreciate assistance: laryngoscope at bedside with diffuse yellow patches of debris ?thrush. plan to rescope on 4/5: no thrush, normal structure and vocal cord mobility  - diflucan 200 mg IV first dose, then diflucan 100 mg IV x6 days then ENT will rescope after treatment (), increased to 200 mg IV per ID recs  - FEES scan: pureed diet and thin liquids, continued ST for dysphagia rehab

## 2022-04-05 NOTE — PROGRESS NOTE ADULT - PROBLEM SELECTOR PLAN 4
- Likely exacerbation in setting of sepsis. S/p tracheoplasty with residual frequent mucus production  - Cont IV antibiotics with Pseudomonal coverage for likely acute exacerbation  - Cont home steroids for severe persistent asthma and adrenal insufficiency  - Pt uses nebulizer treatment at home, duonebs q6h standing. ensure patient is sitting upright for all inhaled meds due to hx of tracheobronchomalacia  - Cont Spiriva  - Incentive spirometry  - Pulmonary (Dr. Allison) following - Likely exacerbation in setting of sepsis. S/p tracheoplasty with residual frequent mucus production, now improved  - Cont IV antibiotics with Pseudomonal coverage for likely acute exacerbation  - Cont home steroids for severe persistent asthma and adrenal insufficiency  - Pt uses nebulizer treatment at home, duonebs q6h standing. ensure patient is sitting upright for all inhaled meds due to hx of tracheobronchomalacia  - Cont Spiriva  - Incentive spirometry  - Pulmonary (Dr. Allison) following

## 2022-04-05 NOTE — DISCHARGE NOTE PROVIDER - HOSPITAL COURSE
71 year old woman with history of tracheobronchomalasia s/p tracheobronchoplasty, bronchiectasis, severe persistent asthma (steroid dependent), adrenal insuffiencey on chronic steroids, DM2, HTN, colorectal cancer s/p total colectomy now with colostomy, PAF on Eliquis presenting with about 3 weeks of productive cough, bilous vomiting, shortness of breath and lethargy. Pt was originally admitted to [facility] on March 8th and treated for pna, given antibiotics, stay c/b intubation 2/2 hypoxia, and discharged on Friday March 25th with oxygen back to rehab (required 3-5L NC, no O2 requirement at baseline). For past 2 days at rehab, pt with worsening SOB, productive cough with white/yellow phlegm, episodes of bilious emesis, increasing lethargy, and febrile (Tmax 105F per daughter). Pt denies headache, vision changes, chills, abdominal pain, palpitations, dysuria, changes in urination, or bloody stools.    In ED: CBC, CMP unremarkable, VBG: pH 7.46, pCO2 45, bicarb 32, lactate 2.1, urine with large leuk esterase conc, uWBC 197 with yeast-like cells, RVP neg, CXR limited but unremarkable. S/p cefepime, vanocmycin, 500 cc bolus, solu-medrol 40 mg IV.    The following represents her hospital course:    # Acute hypoxic respiratory failure 2/2 bronchiectasis exacerbation and pneumonia    # DM2 and steroid induced hyperglycemia    # hypophonia     71 year old woman with history of tracheobronchomalasia s/p tracheobronchoplasty, bronchiectasis, severe persistent asthma (steroid dependent), adrenal insuffiencey on chronic steroids, DM2, HTN, colorectal cancer s/p total colectomy now with colostomy, PAF on Eliquis presenting with about 3 weeks of productive cough, bilous vomiting, shortness of breath and lethargy. Pt was originally admitted to [facility] on March 8th and treated for pna, given antibiotics, stay c/b intubation 2/2 hypoxia, and discharged on Friday March 25th with oxygen back to rehab (required 3-5L NC, no O2 requirement at baseline). For past 2 days at rehab, pt with worsening SOB, productive cough with white/yellow phlegm, episodes of bilious emesis, increasing lethargy, and febrile (Tmax 105F per daughter). Pt denies headache, vision changes, chills, abdominal pain, palpitations, dysuria, changes in urination, or bloody stools.    In ED: CBC, CMP unremarkable, VBG: pH 7.46, pCO2 45, bicarb 32, lactate 2.1, urine with large leuk esterase conc, uWBC 197 with yeast-like cells, RVP neg, CXR limited but unremarkable. S/p cefepime, vanocmycin, 500 cc bolus, solu-medrol 40 mg IV.    The following represents her hospital course:    # Acute hypoxic respiratory failure 2/2 bronchiectasis exacerbation and pneumonia  -     # DM2 and steroid induced hyperglycemia    # hypophonia  - Patient came     At the time of discharge patient was medically stable for discharge to [facility] with close follow up with pulmonary/ [facility]AR program.    71 year old woman with history of tracheobronchomalasia s/p tracheobronchoplasty, bronchiectasis, severe persistent asthma (steroid dependent), adrenal insuffiencey on chronic steroids, DM2, HTN, colorectal cancer s/p total colectomy now with colostomy, PAF on Eliquis presenting with about 3 weeks of productive cough, bilous vomiting, shortness of breath and lethargy. Pt was originally admitted to [facility] on March 8th and treated for pna, given antibiotics, stay c/b intubation 2/2 hypoxia, and discharged on Friday March 25th with oxygen back to rehab (required 3-5L NC, no O2 requirement at baseline). For past 2 days at rehab, pt with worsening SOB, productive cough with white/yellow phlegm, episodes of bilious emesis, increasing lethargy, and febrile (Tmax 105F per daughter). Pt denies headache, vision changes, chills, abdominal pain, palpitations, dysuria, changes in urination, or bloody stools.    In ED: CBC, CMP unremarkable, VBG: pH 7.46, pCO2 45, bicarb 32, lactate 2.1, urine with large leuk esterase conc, uWBC 197 with yeast-like cells, RVP neg, CXR limited but unremarkable. S/p cefepime, vanocmycin, 500 cc bolus, solu-medrol 40 mg IV.    The following represents her hospital course:    # Acute hypoxic respiratory failure 2/2 bronchiectasis exacerbation and pneumonia  # severe sepsis  - Patient presented with acute hypoxic respiratory failure and severe sepsis from pneumonia and bronchiectasis exacerbation. Also with +u/a with urine cultures <50K candida glabrata. Patient was     # DM2 and steroid induced hyperglycemia    # hypophonia  - Patient came     At the time of discharge patient was medically stable for discharge to [facility] with close follow up with pulmonary/ STAR program.    71 year old woman with history of tracheobronchomalasia s/p tracheobronchoplasty, bronchiectasis, severe persistent asthma (steroid dependent), adrenal insuffiencey on chronic steroids, DM2, HTN, colorectal cancer s/p total colectomy now with colostomy, PAF on Eliquis presenting with about 3 weeks of productive cough, bilous vomiting, shortness of breath and lethargy. Pt was originally admitted to [facility] on March 8th and treated for pna, given antibiotics, stay c/b intubation 2/2 hypoxia, and discharged on Friday March 25th with oxygen back to rehab (required 3-5L NC, no O2 requirement at baseline). For past 2 days at rehab, pt with worsening SOB, productive cough with white/yellow phlegm, episodes of bilious emesis, increasing lethargy, and febrile (Tmax 105F per daughter). Pt denies headache, vision changes, chills, abdominal pain, palpitations, dysuria, changes in urination, or bloody stools.    In ED: CBC, CMP unremarkable, VBG: pH 7.46, pCO2 45, bicarb 32, lactate 2.1, urine with large leuk esterase conc, uWBC 197 with yeast-like cells, RVP neg, CXR limited but unremarkable. S/p cefepime, vanocmycin, 500 cc bolus, solu-medrol 40 mg IV.    The following represents her hospital course:    # Acute hypoxic respiratory failure 2/2 bronchiectasis exacerbation and pneumonia  # severe sepsis  - Patient presented with acute hypoxic respiratory failure and severe sepsis from pneumonia and bronchiectasis exacerbation. CT chest 3/29: worsening extensive bronchial impaction and tree in bud nodularity with new right lower lobe consolidation iso bronchiectasis. diffuse mucoid impaction Also with +u/a with urine cultures <50K candida glabrata. Infectious disease was consulted. Patient was started cefepime (3/29-4/3) and vanc given recent intubation (3/29-4/6), diflucan (3/30-4/5). Hx of sputum cx with pseudomonas via bronchoscopy 2/2020. Bcx 3/30 with MRSE in 1/2 bottles, likely contaminant. Sputum culture grew MRSA. Fungitell, aspergillus abx and galactomannan neg. Repeat blood cultures negative.   - She received aggressive airway clearance measures: chest vest, acapella, incentive spirometry, duonebs q6, hypertonic saline q12  - methylprednisolone 8 mg daily was continued, along with protonix 40 mg daily   - patient improved symptomatically    # DM2 and steroid induced hyperglycemia  - Patient with hx of DM2 (A1c 9.5 on 3/29/22), patient with elevated fingersticks due to resuming home methylprednisolone 8 mg daily. patient was placed on basal bolus and low dose correctional scale. At the time of discharge patient was on lantus 20 U at bedtime and premeal Admelog 9U TID before meals. and low dose insulin sliding scale at bedtime and premeals  - monitor fingersticks for goal 140-180    # hypophonia  - Patient presented with throat pain and hypophonia. Reported that it started after her extubation at [facility]. ENT was consulted, Laryngoscopy at bedside with diffuse yellow patches of debris, possible thrush. Patient received diflucan from 3/30-4/6. EKG at admit with , after completing diflucan , on day of discharge QTC <500. Improved symptoms and hypophonia after diflucan. Upon repeat laryngoscopy per ENT on 4/5, no thrush, normal structure, and vocal cord mobility. FEES scan was performed, recommended pureed diet and thin liquids, continued speech therapy for dysphagia rehab.     # Stage 2 sacral ulcer 2/2 incontinence dermatitis  - c/w skin barriers    Recommend:  1.) topical therapy: sacral/buttock injury - cleanse with incontinence cleanser, pat dry, apply Triad ointment twice daily and PRN for incontinent episodes  2.) Incontinence Management - incontinence cleanser, pads, pericare BID, avoid diapers  3.) Maintain on an alternating air with low air loss surface  4.) Turn and reposition Q 2 hours  5.) Nutrition optimization  6.) Offload heels/feet with complete cair air fluidized boots; ensure that the soles of the feet are not resting on the foot board of the bed.    # malnutrition  - MVI, zinc sulfate 220 mg daily, ascorbic acid 500 mg daily  - Diet: pureed, consistent carb, 3 glucerna shakes per day    At the time of discharge patient was medically stable for discharge to [facility] with close follow up with pulmonary/ [facility] program.     To do: [ ] f/u pulmonary [facility] patient program  [ ] f/u diabetes  [ ] Upon discharge f/[facility] [address] [facility] [phone]  [ ] c/w chest vest, acapella, incentive spirometry, c/w spriva/symbicort, duoneb q6h, singular 10 nightly, hypertonic saline. all inhaled meds and eating must be done in chair  [ ] can check MRSA sputum culture for contact precautions, last dose of vanc 4/6/22   71 year old woman with history of tracheobronchomalasia s/p tracheobronchoplasty, bronchiectasis, severe persistent asthma (steroid dependent), adrenal insuffiencey on chronic steroids, DM2, HTN, colorectal cancer s/p total colectomy now with colostomy, PAF on Eliquis presenting with about 3 weeks of productive cough, bilous vomiting, shortness of breath and lethargy. Pt was originally admitted to [facility] on March 8th and treated for pna, given antibiotics, stay c/b intubation 2/2 hypoxia, and discharged on Friday March 25th with oxygen back to rehab (required 3-5L NC, no O2 requirement at baseline). For past 2 days at rehab, pt with worsening SOB, productive cough with white/yellow phlegm, episodes of bilious emesis, increasing lethargy, and febrile (Tmax 105F per daughter). Pt denies headache, vision changes, chills, abdominal pain, palpitations, dysuria, changes in urination, or bloody stools.    In ED: CBC, CMP unremarkable, VBG: pH 7.46, pCO2 45, bicarb 32, lactate 2.1, urine with large leuk esterase conc, uWBC 197 with yeast-like cells, RVP neg, CXR limited but unremarkable. S/p cefepime, vanocmycin, 500 cc bolus, solu-medrol 40 mg IV.    The following represents her hospital course:    # Acute hypoxic respiratory failure 2/2 bronchiectasis exacerbation and pneumonia  # severe sepsis  - Patient presented with acute hypoxic respiratory failure and severe sepsis from pneumonia and bronchiectasis exacerbation. CT chest 3/29: worsening extensive bronchial impaction and tree in bud nodularity with new right lower lobe consolidation iso bronchiectasis. diffuse mucoid impaction Also with +u/a with urine cultures <50K candida glabrata. Infectious disease was consulted. Patient was started cefepime (3/29-4/3) and vanc given recent intubation (3/29-4/6), diflucan (3/30-4/5). Hx of sputum cx with pseudomonas via bronchoscopy 2/2020. Bcx 3/30 with MRSE in 1/2 bottles, likely contaminant. Sputum culture grew MRSA. Fungitell, aspergillus abx and galactomannan neg. Repeat blood cultures negative.   - She received aggressive airway clearance measures: chest vest, acapella, incentive spirometry, duonebs q6, hypertonic saline q12  - methylprednisolone 8 mg daily was continued, along with protonix 40 mg daily   - patient improved symptomatically    # DM2 and steroid induced hyperglycemia  - Patient with hx of DM2 (A1c 9.5 on 3/29/22), patient with elevated fingersticks due to resuming home methylprednisolone 8 mg daily. patient was placed on basal bolus and low dose correctional scale. At the time of discharge patient was on lantus 20 U at bedtime and premeal Admelog 9U TID before meals. and low dose insulin sliding scale at bedtime and premeals  - monitor fingersticks for goal 140-180    # hypophonia  - Patient presented with throat pain and hypophonia. Reported that it started after her extubation at [facility]. ENT was consulted, Laryngoscopy at bedside with diffuse yellow patches of debris, possible thrush. Patient received diflucan from 3/30-4/6. EKG at admit with , after completing diflucan , on day of discharge QTC <500. Improved symptoms and hypophonia after diflucan. Upon repeat laryngoscopy per ENT on 4/5, no thrush, normal structure, and vocal cord mobility. FEES scan was performed, recommended pureed diet and thin liquids, continued speech therapy for dysphagia rehab.   - crush medications (when feasible), oral hygiene, position upright (90 degrees), small sips/bites    # Stage 2 sacral ulcer 2/2 incontinence dermatitis  - c/w skin barriers    Recommend:  1.) topical therapy: sacral/buttock injury - cleanse with incontinence cleanser, pat dry, apply Triad ointment twice daily and PRN for incontinent episodes  2.) Incontinence Management - incontinence cleanser, pads, pericare BID, avoid diapers  3.) Maintain on an alternating air with low air loss surface  4.) Turn and reposition Q 2 hours  5.) Nutrition optimization  6.) Offload heels/feet with complete cair air fluidized boots; ensure that the soles of the feet are not resting on the foot board of the bed.    # malnutrition  - MVI, zinc sulfate 220 mg daily, ascorbic acid 500 mg daily  - Diet: pureed, consistent carb, 3 glucerna shakes per day    At the time of discharge patient was medically stable for discharge to [facility] with close follow up with pulmonary/ [facility] program.     To do: [ ] f/u pulmonary [facility] patient program  [ ] f/u diabetes  [ ] Upon discharge f/u as outpatient at [facility] [address] [facility] [phone]  [ ] c/w chest vest, acapella, incentive spirometry, c/w spriva/symbicort, duoneb q6h, singular 10 nightly, hypertonic saline. all inhaled meds and eating must be done in chair  [ ] can check MRSA sputum culture for contact precautions, last dose of vanc 4/6/22

## 2022-04-05 NOTE — PROGRESS NOTE ADULT - PROBLEM SELECTOR PLAN 6
- Last A1c 7.8. Pt uses lantus 15 u qhs and Novolog varying dose depending on sugar, however was unable to elaborate on a usual dose of Novolog. Has been eating pureed diet due to weakness since last hospital stay  - Lantus 10 + Admelog 7 TID  - low dose ISS  - Monitor fingersticks for goal 140-180 - Last A1c 7.8. Pt uses lantus 15 u qhs and Novolog varying dose depending on sugar, however was unable to elaborate on a usual dose of Novolog. Has been eating pureed diet due to weakness since last hospital stay  - Lantus 14 + Admelog 7 TID  - low dose ISS  - Monitor fingersticks for goal 140-180

## 2022-04-05 NOTE — DISCHARGE NOTE PROVIDER - NSDCFUSCHEDAPPT_GEN_ALL_CORE_FT
RAYRAY RODRIGUEZ ; 05/31/2022 [facility] [address] [facility]

## 2022-04-05 NOTE — PROGRESS NOTE ADULT - ASSESSMENT
72 y/o F w/tracheobronchomalacia s/p tracheobronchoplasty, severe persistent asthma, bronchiectasis, and colon cancer s/p colectomy admitted with likely exacerbation of bronchiectasis due to pneumonia.    - Supplemental O2 as needed goal O2 sat >= 90%  - Broad spectrum abx including pseudomonal coverage  - Airway clearance, acapella device, bronchodilators, chest PT  - Continue home asthma regimen  - Repeat CT scan in 3 months for follow up of new pulmonary nodule  *******************************************  3/29-no signif changes-ID f/up                                          SEE BELOW:  3/30-ENT evaln= fungus, ID f/up, diflucan in place; ? need for fob here  3/31-no new issues-weakness continues  4/1-tx to isolation room-ID evaln-MRSA and pseudomonas as well as achromobacter and kleb agree with vancomycin and cefepime for now  4/4-ID f/up-improved over all, continue on current abx as per ID  4/5-ID f/up-off cefepime--vanco to continue/ fluconazole

## 2022-04-05 NOTE — PROGRESS NOTE ADULT - PROBLEM SELECTOR PLAN 3
- patient is s/p colostomy after colon cancer s/p total colectomy. now with decreased colostomy output on 4/2.  - reportedly with constipation after holding home lactulose, now improved  - Output noted from ostomy for first time in 2 days - small solid clumps, now pasty  - c/w home lactulose  - c/w miralax and senna  - serial abdominal exam  - can consider GI series if without resolution - patient is s/p colostomy after colon cancer s/p total colectomy.  - reportedly with constipation and decreased colostomy output after holding home lactulose, now improved  - Output noted from ostomy for first time in 2 days - small solid clumps, now pasty  - c/w home lactulose  - c/w miralax and senna  - serial abdominal exam  - can consider GI series if without resolution

## 2022-04-05 NOTE — PROGRESS NOTE ADULT - PROBLEM SELECTOR PLAN 11
DVT: Eliquis  Diet: pureed, thin liquids, consistent carb diet, glucerna shake 3/day, zinc, MVI, and vitamin C.  speech/swallow eval passed, nutrition consult, appreciate recs  PT: [facility]  LDA: colostomy, PIV  Dispo: [facility] pending completion of abx regimen. [facility] patient will establish follow up appt with pulm  Code: Full code

## 2022-04-05 NOTE — PROGRESS NOTE ADULT - SUBJECTIVE AND OBJECTIVE BOX
PROGRESS NOTE:   Authored by Raissa Nguyen MD  Internal Medicine      Patient is a 71y old  Female who presents with a chief complaint of sepsis (05 Apr 2022 05:23)      SUBJECTIVE / OVERNIGHT EVENTS: NAEO, patient seen and examined at bedside    MEDICATIONS  (STANDING):  albuterol/ipratropium for Nebulization 3 milliLiter(s) Nebulizer every 6 hours  apixaban 5 milliGRAM(s) Oral every 12 hours  ascorbic acid 500 milliGRAM(s) Oral daily  atorvastatin 20 milliGRAM(s) Oral at bedtime  chlorhexidine 2% Cloths 1 Application(s) Topical <User Schedule>  dextrose 5%. 1000 milliLiter(s) (100 mL/Hr) IV Continuous <Continuous>  dextrose 5%. 1000 milliLiter(s) (50 mL/Hr) IV Continuous <Continuous>  dextrose 5%. 1000 milliLiter(s) (50 mL/Hr) IV Continuous <Continuous>  dextrose 5%. 1000 milliLiter(s) (100 mL/Hr) IV Continuous <Continuous>  dextrose 50% Injectable 25 Gram(s) IV Push once  dextrose 50% Injectable 12.5 Gram(s) IV Push once  dextrose 50% Injectable 25 Gram(s) IV Push once  dextrose 50% Injectable 25 Gram(s) IV Push once  dextrose 50% Injectable 12.5 Gram(s) IV Push once  dextrose 50% Injectable 25 Gram(s) IV Push once  fluconAZOLE IVPB 200 milliGRAM(s) IV Intermittent every 24 hours  glucagon  Injectable 1 milliGRAM(s) IntraMuscular once  glucagon  Injectable 1 milliGRAM(s) IntraMuscular once  influenza  Vaccine (HIGH DOSE) 0.7 milliLiter(s) IntraMuscular once  insulin glargine Injectable (LANTUS) 14 Unit(s) SubCutaneous at bedtime  insulin lispro (ADMELOG) corrective regimen sliding scale   SubCutaneous three times a day before meals  insulin lispro (ADMELOG) corrective regimen sliding scale   SubCutaneous at bedtime  insulin lispro Injectable (ADMELOG) 7 Unit(s) SubCutaneous three times a day before meals  lactulose Syrup 15 Gram(s) Oral daily  methylPREDNISolone 8 milliGRAM(s) Oral daily  mexiletine 200 milliGRAM(s) Oral three times a day  multivitamin 1 Tablet(s) Oral daily  pantoprazole    Tablet 40 milliGRAM(s) Oral before breakfast  polyethylene glycol 3350 17 Gram(s) Oral two times a day  pregabalin 150 milliGRAM(s) Oral two times a day  senna 2 Tablet(s) Oral at bedtime  sodium chloride 7% Inhalation 4 milliLiter(s) Inhalation every 12 hours  vancomycin  IVPB 1000 milliGRAM(s) IV Intermittent every 12 hours  zinc sulfate 220 milliGRAM(s) Oral daily    MEDICATIONS  (PRN):  acetaminophen     Tablet .. 975 milliGRAM(s) Oral every 6 hours PRN Temp greater or equal to 38C (100.4F), Mild Pain (1 - 3)  aluminum hydroxide/magnesium hydroxide/simethicone Suspension 30 milliLiter(s) Oral every 4 hours PRN Dyspepsia  dextrose Oral Gel 15 Gram(s) Oral once PRN Blood Glucose LESS THAN 70 milliGRAM(s)/deciliter  dextrose Oral Gel 15 Gram(s) Oral once PRN Blood Glucose LESS THAN 70 milliGRAM(s)/deciliter  ondansetron Injectable 4 milliGRAM(s) IV Push every 8 hours PRN Nausea and/or Vomiting      CAPILLARY BLOOD GLUCOSE      POCT Blood Glucose.: 166 mg/dL (04 Apr 2022 21:07)  POCT Blood Glucose.: 254 mg/dL (04 Apr 2022 17:28)  POCT Blood Glucose.: 253 mg/dL (04 Apr 2022 11:31)  POCT Blood Glucose.: 239 mg/dL (04 Apr 2022 07:42)    I&O's Summary    04 Apr 2022 07:01  -  05 Apr 2022 07:00  --------------------------------------------------------  IN: 970 mL / OUT: 740 mL / NET: 230 mL        PHYSICAL EXAM:  Vital Signs Last 24 Hrs  T(C): 36.8 (05 Apr 2022 05:05), Max: 37.4 (04 Apr 2022 16:31)  T(F): 98.2 (05 Apr 2022 05:05), Max: 99.3 (04 Apr 2022 16:31)  HR: 89 (05 Apr 2022 05:05) (82 - 97)  BP: 148/77 (05 Apr 2022 05:05) (121/71 - 148/77)  BP(mean): --  RR: 18 (05 Apr 2022 05:05) (18 - 18)  SpO2: 100% (05 Apr 2022 05:05) (94% - 100%)  CONSTITUTIONAL: Well-groomed, in no apparent distress  EYES: No conjunctival or scleral injection, non-icteric;   ENMT: No external nasal lesions; MMM  NECK: Trachea midline without palpable neck mass; thyroid not enlarged and non-tender  RESPIRATORY: Breathing comfortably; no dullness to percussion; lungs CTA without wheeze/rhonchi/rales  CARDIOVASCULAR: +S1S2, RRR, no M/G/R; pedal pulses full and symmetric; no lower extremity edema  GASTROINTESTINAL: No palpable masses or tenderness, +BS throughout, no rebound/guarding; no hepatosplenomegaly; no hernia palpated  LYMPHATIC: No cervical LAD or tenderness  SKIN: No rashes or ulcers noted  NEUROLOGIC: CN II-XII intact; sensation intact in LEs b/l to light touch  PSYCHIATRIC: A+O x 3; mood and affect appropriate; appropriate insight and judgment    LABS:                        10.4   7.29  )-----------( 309      ( 04 Apr 2022 06:39 )             34.4     04-04    139  |  100  |  10  ----------------------------<  191<H>  4.7   |  29  |  0.39<L>    Ca    8.7      04 Apr 2022 06:38  Phos  2.5     04-04  Mg     1.8     04-04    TPro  6.1  /  Alb  2.6<L>  /  TBili  0.3  /  DBili  x   /  AST  30  /  ALT  63<H>  /  AlkPhos  103  04-04                RADIOLOGY & ADDITIONAL TESTS:  Results Reviewed:   Imaging Personally Reviewed:  Electrocardiogram Personally Reviewed:    COORDINATION OF CARE:  Care Discussed with Consultants/Other Providers [Y/N]:  Prior or Outpatient Records Reviewed [Y/N]:   PROGRESS NOTE:   Authored by Raissa Nguyen MD  Internal Medicine      Patient is a 71y old  Female who presents with a chief complaint of sepsis (05 Apr 2022 05:23)      SUBJECTIVE / OVERNIGHT EVENTS: CHRISTIANEEO, patient seen and examined at bedside. reports that she is feeling better today. sitting up and eating breakfast, likes the yogurt. cough is improved, less sputum production. belly pain improved.     MEDICATIONS  (STANDING):  albuterol/ipratropium for Nebulization 3 milliLiter(s) Nebulizer every 6 hours  apixaban 5 milliGRAM(s) Oral every 12 hours  ascorbic acid 500 milliGRAM(s) Oral daily  atorvastatin 20 milliGRAM(s) Oral at bedtime  chlorhexidine 2% Cloths 1 Application(s) Topical <User Schedule>  dextrose 5%. 1000 milliLiter(s) (100 mL/Hr) IV Continuous <Continuous>  dextrose 5%. 1000 milliLiter(s) (50 mL/Hr) IV Continuous <Continuous>  dextrose 5%. 1000 milliLiter(s) (50 mL/Hr) IV Continuous <Continuous>  dextrose 5%. 1000 milliLiter(s) (100 mL/Hr) IV Continuous <Continuous>  dextrose 50% Injectable 25 Gram(s) IV Push once  dextrose 50% Injectable 12.5 Gram(s) IV Push once  dextrose 50% Injectable 25 Gram(s) IV Push once  dextrose 50% Injectable 25 Gram(s) IV Push once  dextrose 50% Injectable 12.5 Gram(s) IV Push once  dextrose 50% Injectable 25 Gram(s) IV Push once  fluconAZOLE IVPB 200 milliGRAM(s) IV Intermittent every 24 hours  glucagon  Injectable 1 milliGRAM(s) IntraMuscular once  glucagon  Injectable 1 milliGRAM(s) IntraMuscular once  influenza  Vaccine (HIGH DOSE) 0.7 milliLiter(s) IntraMuscular once  insulin glargine Injectable (LANTUS) 14 Unit(s) SubCutaneous at bedtime  insulin lispro (ADMELOG) corrective regimen sliding scale   SubCutaneous three times a day before meals  insulin lispro (ADMELOG) corrective regimen sliding scale   SubCutaneous at bedtime  insulin lispro Injectable (ADMELOG) 7 Unit(s) SubCutaneous three times a day before meals  lactulose Syrup 15 Gram(s) Oral daily  methylPREDNISolone 8 milliGRAM(s) Oral daily  mexiletine 200 milliGRAM(s) Oral three times a day  multivitamin 1 Tablet(s) Oral daily  pantoprazole    Tablet 40 milliGRAM(s) Oral before breakfast  polyethylene glycol 3350 17 Gram(s) Oral two times a day  pregabalin 150 milliGRAM(s) Oral two times a day  senna 2 Tablet(s) Oral at bedtime  sodium chloride 7% Inhalation 4 milliLiter(s) Inhalation every 12 hours  vancomycin  IVPB 1000 milliGRAM(s) IV Intermittent every 12 hours  zinc sulfate 220 milliGRAM(s) Oral daily    MEDICATIONS  (PRN):  acetaminophen     Tablet .. 975 milliGRAM(s) Oral every 6 hours PRN Temp greater or equal to 38C (100.4F), Mild Pain (1 - 3)  aluminum hydroxide/magnesium hydroxide/simethicone Suspension 30 milliLiter(s) Oral every 4 hours PRN Dyspepsia  dextrose Oral Gel 15 Gram(s) Oral once PRN Blood Glucose LESS THAN 70 milliGRAM(s)/deciliter  dextrose Oral Gel 15 Gram(s) Oral once PRN Blood Glucose LESS THAN 70 milliGRAM(s)/deciliter  ondansetron Injectable 4 milliGRAM(s) IV Push every 8 hours PRN Nausea and/or Vomiting      CAPILLARY BLOOD GLUCOSE      POCT Blood Glucose.: 166 mg/dL (04 Apr 2022 21:07)  POCT Blood Glucose.: 254 mg/dL (04 Apr 2022 17:28)  POCT Blood Glucose.: 253 mg/dL (04 Apr 2022 11:31)  POCT Blood Glucose.: 239 mg/dL (04 Apr 2022 07:42)    I&O's Summary    04 Apr 2022 07:01  -  05 Apr 2022 07:00  --------------------------------------------------------  IN: 970 mL / OUT: 740 mL / NET: 230 mL        PHYSICAL EXAM:  Vital Signs Last 24 Hrs  T(C): 36.8 (05 Apr 2022 05:05), Max: 37.4 (04 Apr 2022 16:31)  T(F): 98.2 (05 Apr 2022 05:05), Max: 99.3 (04 Apr 2022 16:31)  HR: 89 (05 Apr 2022 05:05) (82 - 97)  BP: 148/77 (05 Apr 2022 05:05) (121/71 - 148/77)  BP(mean): --  RR: 18 (05 Apr 2022 05:05) (18 - 18)  SpO2: 100% (05 Apr 2022 05:05) (94% - 100%)  CONSTITUTIONAL: alert, comfortably laying in bed, not in acute distress, conversant,   EYES: No conjunctival or scleral injection, non-icteric;   ENMT: No external nasal lesions; MMM  NECK: Trachea midline without palpable neck mass; thyroid not enlarged and non-tender  RESPIRATORY: Breathing comfortably, speaking in full sentences, not using accessory mm to breathe, no w/r/r  CARDIOVASCULAR: +S1S2, RRR, no M/G/R; pedal pulses full and symmetric; no lower extremity edema  GASTROINTESTINAL: No palpable masses or tenderness, +BS throughout, no rebound/guarding; no hernia palpated, colostomy in place with stool output, pink without erythema.   LYMPHATIC: No cervical LAD or tenderness  NEUROLOGIC: nonfocal, moves all extremities spontaneously,   PSYCHIATRIC: A+O x 3; mood and affect appropriate; appropriate insight and judgment       LABS:                        10.4   7.29  )-----------( 309      ( 04 Apr 2022 06:39 )             34.4     04-04    139  |  100  |  10  ----------------------------<  191<H>  4.7   |  29  |  0.39<L>    Ca    8.7      04 Apr 2022 06:38  Phos  2.5     04-04  Mg     1.8     04-04    TPro  6.1  /  Alb  2.6<L>  /  TBili  0.3  /  DBili  x   /  AST  30  /  ALT  63<H>  /  AlkPhos  103  04-04                RADIOLOGY & ADDITIONAL TESTS:  Results Reviewed:   Imaging Personally Reviewed:  Electrocardiogram Personally Reviewed:    COORDINATION OF CARE:  Care Discussed with Consultants/Other Providers [Y/N]:  Prior or Outpatient Records Reviewed [Y/N]:

## 2022-04-05 NOTE — DISCHARGE NOTE PROVIDER - NSDCFUADDAPPT_GEN_ALL_CORE_FT
1. Upon discharge follow up as outpatient at [facility] [address] [facility] [phone]  2. please follow up with pulmonary Dr. Allison   3. please follow up with infectious disease Dr. Stout  4. Please follow up with PCP Dr. Samuels for hospital follow up. 1. Upon discharge follow up as outpatient at [facility] [address] [facility] [phone]  2. please follow up with pulmonary Dr. Allison   3. please follow up with infectious disease Dr. Stout  4. Please follow up with PCP Dr. Samuels for hospital follow up.  5. Speech therapy, dysphagia rehab

## 2022-04-05 NOTE — DISCHARGE NOTE PROVIDER - PROVIDER TOKENS
PROVIDER:[TOKEN:[[id number]],FOLLOWUP:[1 week]] PROVIDER:[TOKEN:[[id number]],FOLLOWUP:[1 week],ESTABLISHEDPATIENT:[T]],PROVIDER:[TOKEN:[[id number]],FOLLOWUP:[2 weeks],ESTABLISHEDPATIENT:[T]],PROVIDER:[TOKEN:[[id number]],FOLLOWUP:[1 month],ESTABLISHEDPATIENT:[T]]

## 2022-04-05 NOTE — PROGRESS NOTE ADULT - SUBJECTIVE AND OBJECTIVE BOX
Patient is a 71y old  Female who presents with a chief complaint of sepsis (05 Apr 2022 08:33)    Being followed by ID for MRSA        Interval history:  now off oxygen  still with productive cough   no GI complaints   no urinary complaints  swallows carefully  No other acute events      PAST MEDICAL & SURGICAL HISTORY:  Atrial fibrillation  paroxysmal, on eliquis    Diabetes  Type 2    COPD (chronic obstructive pulmonary disease)    Adrenal insufficiency  Medrol daily for over 50 years    Aortic insufficiency  moderate AR on echo 5/3/2018    Pelvic fracture    Asthma    Tracheobronchomalacia  diagnosed 2015, s/p bronchial thermoplasty 2016 (Dr Zapien); recent bronchoscopy 6/5/2018 revealed no evidence of tracheobronchomalacia in trachea or bronchial tubes    Colorectal cancer  4/2018- last treatment , chemo and radiation    Rectal bleeding    Seizure  x 1 1/7/18    DVT (deep venous thrombosis)  15-20 years ago, took coumadin    TIA (transient ischemic attack)  multiple, last 5 years ago - presents as right-sided weakness    History of partial hysterectomy  30 years ago - fibroids    H/O total knee replacement, bilateral  5 years ago    History of sinus surgery  multiple sinus surgeries    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    H/O pelvic surgery  5 years ago - s/p fracture    History of tracheomalacia  2015 - attempted tracheal stenting ([facility])- course complicated by obstruction, respiratory failure, multiple CPR attempts -  stent discontinued; 10/20/2016 Tracheobronchoplasty (Prolene Mesh) performed at [facility] by Dr Zapien    S/P bronchoscopy  6/5/2018 - [place] (Dr Zapien) no evidence of tracheobronchomalacia in trachea or bronchial tubes    Rectal bleeding  exam under anesthesia ([facility]) 2/2018      Allergies    aspirin (Short breath)  Avelox (Short breath; Pruritus)  codeine (Short breath)  Dilaudid (Short breath)  iodine (Short breath; Swelling)  shellfish (Anaphylaxis)  tetanus toxoid (Short breath)  Valium (Short breath)    Intolerances      Antimicrobials:    fluconAZOLE IVPB 200 milliGRAM(s) IV Intermittent every 24 hours  vancomycin  IVPB 1000 milliGRAM(s) IV Intermittent every 12 hours    MEDICATIONS  (STANDING):  albuterol/ipratropium for Nebulization 3 milliLiter(s) Nebulizer every 6 hours  apixaban 5 milliGRAM(s) Oral every 12 hours  ascorbic acid 500 milliGRAM(s) Oral daily  atorvastatin 20 milliGRAM(s) Oral at bedtime  chlorhexidine 2% Cloths 1 Application(s) Topical <User Schedule>  dextrose 5%. 1000 milliLiter(s) (50 mL/Hr) IV Continuous <Continuous>  dextrose 5%. 1000 milliLiter(s) (100 mL/Hr) IV Continuous <Continuous>  dextrose 5%. 1000 milliLiter(s) (50 mL/Hr) IV Continuous <Continuous>  dextrose 5%. 1000 milliLiter(s) (100 mL/Hr) IV Continuous <Continuous>  dextrose 50% Injectable 25 Gram(s) IV Push once  dextrose 50% Injectable 12.5 Gram(s) IV Push once  dextrose 50% Injectable 25 Gram(s) IV Push once  dextrose 50% Injectable 25 Gram(s) IV Push once  dextrose 50% Injectable 12.5 Gram(s) IV Push once  dextrose 50% Injectable 25 Gram(s) IV Push once  fluconAZOLE IVPB 200 milliGRAM(s) IV Intermittent every 24 hours  glucagon  Injectable 1 milliGRAM(s) IntraMuscular once  glucagon  Injectable 1 milliGRAM(s) IntraMuscular once  influenza  Vaccine (HIGH DOSE) 0.7 milliLiter(s) IntraMuscular once  insulin glargine Injectable (LANTUS) 14 Unit(s) SubCutaneous at bedtime  insulin lispro (ADMELOG) corrective regimen sliding scale   SubCutaneous three times a day before meals  insulin lispro (ADMELOG) corrective regimen sliding scale   SubCutaneous at bedtime  insulin lispro Injectable (ADMELOG) 7 Unit(s) SubCutaneous three times a day before meals  lactulose Syrup 15 Gram(s) Oral daily  methylPREDNISolone 8 milliGRAM(s) Oral daily  mexiletine 200 milliGRAM(s) Oral three times a day  multivitamin 1 Tablet(s) Oral daily  pantoprazole    Tablet 40 milliGRAM(s) Oral before breakfast  polyethylene glycol 3350 17 Gram(s) Oral two times a day  pregabalin 150 milliGRAM(s) Oral two times a day  senna 2 Tablet(s) Oral at bedtime  sodium chloride 7% Inhalation 4 milliLiter(s) Inhalation every 12 hours  vancomycin  IVPB 1000 milliGRAM(s) IV Intermittent every 12 hours  zinc sulfate 220 milliGRAM(s) Oral daily      Vital Signs Last 24 Hrs  T(C): 36.8 (04-05-22 @ 05:05), Max: 37.4 (04-04-22 @ 16:31)  T(F): 98.2 (04-05-22 @ 05:05), Max: 99.3 (04-04-22 @ 16:31)  HR: 89 (04-05-22 @ 05:05) (82 - 97)  BP: 148/77 (04-05-22 @ 05:05) (121/71 - 148/77)  BP(mean): --  RR: 18 (04-05-22 @ 08:10) (18 - 18)  SpO2: 97% (04-05-22 @ 08:10) (94% - 100%)    Physical Exam:    Constitutional well preserved,comfortable,pleasant    HEENT PERRLA EOMI,No pallor or icterus    No oral exudate or erythema    no thrush    Neck supple no JVD or LN    Chest occsional rhonchi    CVS  S1 S2     Abd soft BS normal No tenderness     Ext No cyanosis clubbing or edema    IV site no erythema tenderness or discharge    Joints no swelling or LOM    CNS AAO X 3 no focal    Lab Data:                          10.4   7.29  )-----------( 309      ( 04 Apr 2022 06:39 )             34.4       04-04    139  |  100  |  10  ----------------------------<  191<H>  4.7   |  29  |  0.39<L>    Ca    8.7      04 Apr 2022 06:38  Phos  2.5     04-04  Mg     1.8     04-04    TPro  6.1  /  Alb  2.6<L>  /  TBili  0.3  /  DBili  x   /  AST  30  /  ALT  63<H>  /  AlkPhos  103  04-04          .Blood Blood  04-01-22   No growth to date.  --  --      .Blood Blood  03-30-22   Growth in aerobic bottle: Staphylococcus epidermidis  Coag Negative Staphylococcus  Single set isolate, possible contaminant. Contact  Microbiology if susceptibility testing clinically  indicated.  ***Blood Panel PCR results on this specimen are available  approximately 3 hours after the Gram stain result.***  Gram stain, PCR, and/or culture results may not always  correspond due to difference in methodologies.  ************************************************************  This PCR assay was performed by multiplex PCR. This  Assay tests for 66 bacterial and resistance gene targets.  Please refer to the [facility] test directory  at [email or web address] for details.  --  Blood Culture PCR        Vancomycin Level, Trough: 16.2 ug/mL (04-03-22 @ 22:55)      WBC Count: 7.29 (04-04-22 @ 06:39)  WBC Count: 7.33 (04-03-22 @ 11:00)  WBC Count: 4.87 (04-02-22 @ 05:34)  WBC Count: 5.35 (04-01-22 @ 07:34)  WBC Count: 6.91 (03-31-22 @ 07:38)  WBC Count: 7.42 (03-30-22 @ 07:14)  WBC Count: 8.89 (03-29-22 @ 09:41)      Aspergillus Galactomannan Antigen, Serum/BAL (04.01.22 @ 10:03)    Aspergillus Galactomannan Antigen, Serum/BAL: 0.03: Performed At: [address] [id number]  Dewey Regalado MD Ph:[phone] Index    Aspergillus Antibodies (12.04.21 @ 13:39)    Aspergillus niger Antibody: Negative: Performed At: [address] [id number]  Dewey Regalado MD Ph:[phone]    Aspergillus fumigatis IgG Antibody: Negative    Aspergillus flavus Antibody: Negative

## 2022-04-05 NOTE — DISCHARGE NOTE PROVIDER - CARE PROVIDERS DIRECT ADDRESSES
,[email or web address]

## 2022-04-05 NOTE — PROGRESS NOTE ADULT - SUBJECTIVE AND OBJECTIVE BOX
ENT ISSUE/POD: thrush    HPI: 70 yo F seen 1 week ago dfor dysphonia, was found to have pharyngolaryngo thrush seen on scope. Pt with improvement in voice quality. Has been on diflucan. Tolerating pureed diet      PAST MEDICAL & SURGICAL HISTORY:  Atrial fibrillation  paroxysmal, on eliquis    Diabetes  Type 2    COPD (chronic obstructive pulmonary disease)    Adrenal insufficiency  Medrol daily for over 50 years    Aortic insufficiency  moderate AR on echo 5/3/2018    Pelvic fracture    Asthma    Tracheobronchomalacia  diagnosed 2015, s/p bronchial thermoplasty 2016 (Dr Zapien); recent bronchoscopy 6/5/2018 revealed no evidence of tracheobronchomalacia in trachea or bronchial tubes    Colorectal cancer  4/2018- last treatment , chemo and radiation    Rectal bleeding    Seizure  x 1 1/7/18    DVT (deep venous thrombosis)  15-20 years ago, took coumadin    TIA (transient ischemic attack)  multiple, last 5 years ago - presents as right-sided weakness    History of partial hysterectomy  30 years ago - fibroids    H/O total knee replacement, bilateral  5 years ago    History of sinus surgery  multiple sinus surgeries    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    H/O pelvic surgery  5 years ago - s/p fracture    History of tracheomalacia  2015 - attempted tracheal stenting ([facility])- course complicated by obstruction, respiratory failure, multiple CPR attempts -  stent discontinued; 10/20/2016 Tracheobronchoplasty (Prolene Mesh) performed at [facility] by Dr Zapien    S/P bronchoscopy  6/5/2018 - [facility] (Dr Zapien) no evidence of tracheobronchomalacia in trachea or bronchial tubes    Rectal bleeding  exam under anesthesia ([facility]) 2/2018      Allergies    aspirin (Short breath)  Avelox (Short breath; Pruritus)  codeine (Short breath)  Dilaudid (Short breath)  iodine (Short breath; Swelling)  shellfish (Anaphylaxis)  tetanus toxoid (Short breath)  Valium (Short breath)    Intolerances      MEDICATIONS  (STANDING):  albuterol/ipratropium for Nebulization 3 milliLiter(s) Nebulizer every 6 hours  apixaban 5 milliGRAM(s) Oral every 12 hours  ascorbic acid 500 milliGRAM(s) Oral daily  atorvastatin 20 milliGRAM(s) Oral at bedtime  chlorhexidine 2% Cloths 1 Application(s) Topical <User Schedule>  dextrose 5%. 1000 milliLiter(s) (100 mL/Hr) IV Continuous <Continuous>  dextrose 5%. 1000 milliLiter(s) (50 mL/Hr) IV Continuous <Continuous>  dextrose 5%. 1000 milliLiter(s) (50 mL/Hr) IV Continuous <Continuous>  dextrose 5%. 1000 milliLiter(s) (100 mL/Hr) IV Continuous <Continuous>  dextrose 50% Injectable 25 Gram(s) IV Push once  dextrose 50% Injectable 12.5 Gram(s) IV Push once  dextrose 50% Injectable 25 Gram(s) IV Push once  dextrose 50% Injectable 25 Gram(s) IV Push once  dextrose 50% Injectable 12.5 Gram(s) IV Push once  dextrose 50% Injectable 25 Gram(s) IV Push once  fluconAZOLE IVPB 200 milliGRAM(s) IV Intermittent every 24 hours  glucagon  Injectable 1 milliGRAM(s) IntraMuscular once  glucagon  Injectable 1 milliGRAM(s) IntraMuscular once  influenza  Vaccine (HIGH DOSE) 0.7 milliLiter(s) IntraMuscular once  insulin glargine Injectable (LANTUS) 14 Unit(s) SubCutaneous at bedtime  insulin lispro (ADMELOG) corrective regimen sliding scale   SubCutaneous three times a day before meals  insulin lispro (ADMELOG) corrective regimen sliding scale   SubCutaneous at bedtime  insulin lispro Injectable (ADMELOG) 7 Unit(s) SubCutaneous three times a day before meals  lactulose Syrup 15 Gram(s) Oral daily  methylPREDNISolone 8 milliGRAM(s) Oral daily  mexiletine 200 milliGRAM(s) Oral three times a day  multivitamin 1 Tablet(s) Oral daily  pantoprazole    Tablet 40 milliGRAM(s) Oral before breakfast  polyethylene glycol 3350 17 Gram(s) Oral two times a day  pregabalin 150 milliGRAM(s) Oral two times a day  senna 2 Tablet(s) Oral at bedtime  sodium chloride 7% Inhalation 4 milliLiter(s) Inhalation every 12 hours  vancomycin  IVPB 1000 milliGRAM(s) IV Intermittent every 12 hours  zinc sulfate 220 milliGRAM(s) Oral daily    MEDICATIONS  (PRN):  acetaminophen     Tablet .. 975 milliGRAM(s) Oral every 6 hours PRN Temp greater or equal to 38C (100.4F), Mild Pain (1 - 3)  aluminum hydroxide/magnesium hydroxide/simethicone Suspension 30 milliLiter(s) Oral every 4 hours PRN Dyspepsia  dextrose Oral Gel 15 Gram(s) Oral once PRN Blood Glucose LESS THAN 70 milliGRAM(s)/deciliter  dextrose Oral Gel 15 Gram(s) Oral once PRN Blood Glucose LESS THAN 70 milliGRAM(s)/deciliter  ondansetron Injectable 4 milliGRAM(s) IV Push every 8 hours PRN Nausea and/or Vomiting    ROS:   ENT: all negative except as noted in HPI   Pulm: denies SOB, cough, hemoptysis  Neuro: denies numbness/tingling, loss of sensation  Endo: denies heat/cold intolerance, excessive sweating      Vital Signs Last 24 Hrs  T(C): 36.8 (05 Apr 2022 05:05), Max: 37.4 (04 Apr 2022 16:31)  T(F): 98.2 (05 Apr 2022 05:05), Max: 99.3 (04 Apr 2022 16:31)  HR: 89 (05 Apr 2022 05:05) (82 - 97)  BP: 148/77 (05 Apr 2022 05:05) (121/71 - 148/77)  BP(mean): --  RR: 18 (05 Apr 2022 08:10) (18 - 18)  SpO2: 97% (05 Apr 2022 08:10) (94% - 100%)                          10.4   7.29  )-----------( 309      ( 04 Apr 2022 06:39 )             34.4    04-04    139  |  100  |  10  ----------------------------<  191<H>  4.7   |  29  |  0.39<L>    Ca    8.7      04 Apr 2022 06:38  Phos  2.5     04-04  Mg     1.8     04-04    TPro  6.1  /  Alb  2.6<L>  /  TBili  0.3  /  DBili  x   /  AST  30  /  ALT  63<H>  /  AlkPhos  103  04-04       PHYSICAL EXAM:  Gen: NAD  Skin: No rashes, bruises, or lesions  Head: Normocephalic, Atraumatic  Face: no edema, erythema, or fluctuance. Parotid glands soft without mass  Eyes: no scleral injection  Nose: Nares bilaterally patent, no discharge  Mouth: No Stridor / Drooling / Trismus.  Mucosa moist, tongue/uvula midline, one plaque to buccal mucosa removable w tongue depressor, oropharynx otherwise clear  Neck: Flat, supple, no lymphadenopathy, trachea midline, no masses  Lymphatic: No lymphadenopathy  Resp: breathing easily, no stridor  Neuro: facial nerve intact, no facial droop         ENT ISSUE/POD: thrush    HPI: 70 yo F seen 1 week ago dfor dysphonia, was found to have pharyngolaryngo thrush seen on scope. Pt with improvement in voice quality. Has been on diflucan. Tolerating pureed diet      PAST MEDICAL & SURGICAL HISTORY:  Atrial fibrillation  paroxysmal, on eliquis    Diabetes  Type 2    COPD (chronic obstructive pulmonary disease)    Adrenal insufficiency  Medrol daily for over 50 years    Aortic insufficiency  moderate AR on echo 5/3/2018    Pelvic fracture    Asthma    Tracheobronchomalacia  diagnosed 2015, s/p bronchial thermoplasty 2016 (Dr Zapien); recent bronchoscopy 6/5/2018 revealed no evidence of tracheobronchomalacia in trachea or bronchial tubes    Colorectal cancer  4/2018- last treatment , chemo and radiation    Rectal bleeding    Seizure  x 1 1/7/18    DVT (deep venous thrombosis)  15-20 years ago, took coumadin    TIA (transient ischemic attack)  multiple, last 5 years ago - presents as right-sided weakness    History of partial hysterectomy  30 years ago - fibroids    H/O total knee replacement, bilateral  5 years ago    History of sinus surgery  multiple sinus surgeries    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    H/O pelvic surgery  5 years ago - s/p fracture    History of tracheomalacia  2015 - attempted tracheal stenting ([facility])- course complicated by obstruction, respiratory failure, multiple CPR attempts -  stent discontinued; 10/20/2016 Tracheobronchoplasty (Prolene Mesh) performed at [facility] by Dr Zapien    S/P bronchoscopy  6/5/2018 - [facility] (Dr Zapien) no evidence of tracheobronchomalacia in trachea or bronchial tubes    Rectal bleeding  exam under anesthesia ([facility]) 2/2018      Allergies    aspirin (Short breath)  Avelox (Short breath; Pruritus)  codeine (Short breath)  Dilaudid (Short breath)  iodine (Short breath; Swelling)  shellfish (Anaphylaxis)  tetanus toxoid (Short breath)  Valium (Short breath)    Intolerances      MEDICATIONS  (STANDING):  albuterol/ipratropium for Nebulization 3 milliLiter(s) Nebulizer every 6 hours  apixaban 5 milliGRAM(s) Oral every 12 hours  ascorbic acid 500 milliGRAM(s) Oral daily  atorvastatin 20 milliGRAM(s) Oral at bedtime  chlorhexidine 2% Cloths 1 Application(s) Topical <User Schedule>  dextrose 5%. 1000 milliLiter(s) (100 mL/Hr) IV Continuous <Continuous>  dextrose 5%. 1000 milliLiter(s) (50 mL/Hr) IV Continuous <Continuous>  dextrose 5%. 1000 milliLiter(s) (50 mL/Hr) IV Continuous <Continuous>  dextrose 5%. 1000 milliLiter(s) (100 mL/Hr) IV Continuous <Continuous>  dextrose 50% Injectable 25 Gram(s) IV Push once  dextrose 50% Injectable 12.5 Gram(s) IV Push once  dextrose 50% Injectable 25 Gram(s) IV Push once  dextrose 50% Injectable 25 Gram(s) IV Push once  dextrose 50% Injectable 12.5 Gram(s) IV Push once  dextrose 50% Injectable 25 Gram(s) IV Push once  fluconAZOLE IVPB 200 milliGRAM(s) IV Intermittent every 24 hours  glucagon  Injectable 1 milliGRAM(s) IntraMuscular once  glucagon  Injectable 1 milliGRAM(s) IntraMuscular once  influenza  Vaccine (HIGH DOSE) 0.7 milliLiter(s) IntraMuscular once  insulin glargine Injectable (LANTUS) 14 Unit(s) SubCutaneous at bedtime  insulin lispro (ADMELOG) corrective regimen sliding scale   SubCutaneous three times a day before meals  insulin lispro (ADMELOG) corrective regimen sliding scale   SubCutaneous at bedtime  insulin lispro Injectable (ADMELOG) 7 Unit(s) SubCutaneous three times a day before meals  lactulose Syrup 15 Gram(s) Oral daily  methylPREDNISolone 8 milliGRAM(s) Oral daily  mexiletine 200 milliGRAM(s) Oral three times a day  multivitamin 1 Tablet(s) Oral daily  pantoprazole    Tablet 40 milliGRAM(s) Oral before breakfast  polyethylene glycol 3350 17 Gram(s) Oral two times a day  pregabalin 150 milliGRAM(s) Oral two times a day  senna 2 Tablet(s) Oral at bedtime  sodium chloride 7% Inhalation 4 milliLiter(s) Inhalation every 12 hours  vancomycin  IVPB 1000 milliGRAM(s) IV Intermittent every 12 hours  zinc sulfate 220 milliGRAM(s) Oral daily    MEDICATIONS  (PRN):  acetaminophen     Tablet .. 975 milliGRAM(s) Oral every 6 hours PRN Temp greater or equal to 38C (100.4F), Mild Pain (1 - 3)  aluminum hydroxide/magnesium hydroxide/simethicone Suspension 30 milliLiter(s) Oral every 4 hours PRN Dyspepsia  dextrose Oral Gel 15 Gram(s) Oral once PRN Blood Glucose LESS THAN 70 milliGRAM(s)/deciliter  dextrose Oral Gel 15 Gram(s) Oral once PRN Blood Glucose LESS THAN 70 milliGRAM(s)/deciliter  ondansetron Injectable 4 milliGRAM(s) IV Push every 8 hours PRN Nausea and/or Vomiting    ROS:   ENT: all negative except as noted in HPI   Pulm: denies SOB, cough, hemoptysis  Neuro: denies numbness/tingling, loss of sensation  Endo: denies heat/cold intolerance, excessive sweating      Vital Signs Last 24 Hrs  T(C): 36.8 (05 Apr 2022 05:05), Max: 37.4 (04 Apr 2022 16:31)  T(F): 98.2 (05 Apr 2022 05:05), Max: 99.3 (04 Apr 2022 16:31)  HR: 89 (05 Apr 2022 05:05) (82 - 97)  BP: 148/77 (05 Apr 2022 05:05) (121/71 - 148/77)  BP(mean): --  RR: 18 (05 Apr 2022 08:10) (18 - 18)  SpO2: 97% (05 Apr 2022 08:10) (94% - 100%)                          10.4   7.29  )-----------( 309      ( 04 Apr 2022 06:39 )             34.4    04-04    139  |  100  |  10  ----------------------------<  191<H>  4.7   |  29  |  0.39<L>    Ca    8.7      04 Apr 2022 06:38  Phos  2.5     04-04  Mg     1.8     04-04    TPro  6.1  /  Alb  2.6<L>  /  TBili  0.3  /  DBili  x   /  AST  30  /  ALT  63<H>  /  AlkPhos  103  04-04       PHYSICAL EXAM:  Gen: NAD  Skin: No rashes, bruises, or lesions  Head: Normocephalic, Atraumatic  Face: no edema, erythema, or fluctuance. Parotid glands soft without mass  Eyes: no scleral injection  Nose: Nares bilaterally patent, no discharge  Mouth: No Stridor / Drooling / Trismus.  Mucosa moist, tongue/uvula midline, one plaque to buccal mucosa removable w tongue depressor, oropharynx otherwise clear  Neck: Flat, supple, no lymphadenopathy, trachea midline, no masses  Lymphatic: No lymphadenopathy  Resp: breathing easily, no stridor  Neuro: facial nerve intact, no facial droop      Reason for Laryngoscopy: f/u thrush    Patient was unable to cooperate with mirror.  Nasopharynx, oropharynx, and hypopharynx clear, no bleeding. Tongue base, posterior pharyngeal wall, vallecula, epiglottis, and subglottis appear normal. No erythema, edema, pooling of secretions, masses or lesions. Airway patent, no foreign body visualized. No glottic/supraglottic edema. True vocal cords, arytenoids, vestibular folds, ventricles, pyriform sinuses, and aryepiglottic folds appear normal bilaterally. Vocal cords mobile with good contact b/l.

## 2022-04-05 NOTE — PROGRESS NOTE ADULT - NS ATTEND AMEND GEN_ALL_CORE FT
Patient seen and examined. Laryngoscopy reveals resolved candidiases and normal cord mobility. Complete diflucan course. f/u with ENT as outpatient PRN.

## 2022-04-05 NOTE — DISCHARGE NOTE PROVIDER - NSDCCPCAREPLAN_GEN_ALL_CORE_FT
PRINCIPAL DISCHARGE DIAGNOSIS  Diagnosis: Acute sepsis  Assessment and Plan of Treatment:       SECONDARY DISCHARGE DIAGNOSES  Diagnosis: Thrush  Assessment and Plan of Treatment:      PRINCIPAL DISCHARGE DIAGNOSIS  Diagnosis: Acute sepsis  Assessment and Plan of Treatment:       SECONDARY DISCHARGE DIAGNOSES  Diagnosis: Thrush  Assessment and Plan of Treatment:     Diagnosis: Diabetes  Assessment and Plan of Treatment:      PRINCIPAL DISCHARGE DIAGNOSIS  Diagnosis: Acute sepsis  Assessment and Plan of Treatment: You came to the hospital because you      SECONDARY DISCHARGE DIAGNOSES  Diagnosis: Thrush  Assessment and Plan of Treatment:     Diagnosis: Diabetes  Assessment and Plan of Treatment:      PRINCIPAL DISCHARGE DIAGNOSIS  Diagnosis: Acute sepsis  Assessment and Plan of Treatment: You came to the hospital because you had increased shortness of breathing, cough and sputum production with fever and chills. You got a cat scan of your chest which showed that you have a pneumonia on top of your history of chronic bronchiectasis. You were treated with IV antibiotics, and aggressive airway clearance therapy including chest vest therapy, acapella therapy, hypertonic saline, duonebs, which improved your pneumonia and you cough and sputum production. You got a culture of your sputum which showed that you have MRSA in your sputum, indicating you had MRSA pneumonia. Your blood cultures on repeat were without growth. Your fatigue and mental status improved, and you were able to sit up and start feeding yourself again. Please return to the hospital if you have fevers, chills, fatigue, low energy, worsening cough with sputum production, cough with bloody secretions, shortness of breath. Please continue to utilize your airway clearance therapy. Please make sure you follow up with your lung doctor Dr. Allison once you leave the hospital as well as your ID physician Dr. Stout.      SECONDARY DISCHARGE DIAGNOSES  Diagnosis: Thrush  Assessment and Plan of Treatment: You came to the hospital with throat pain and a softer voice than normal. You were seen by the ENT doctors who took a video scope to visualize the upper area of your breathing tube which also is the location of your voice box. They found evidence of thrush and you were started on a medication called diflucan which helps treat the fungus that causes thrush, candida. You gradually improved. You got a swallow study which recommended you eat a pureed diet with normal liquid consistency. The ENT doctors did a repeat video scope after you finished your diflucan and found that your vocal cords and upper airways were normal and clear of infection.    Diagnosis: Diabetes  Assessment and Plan of Treatment: You have a history of type 2 diabetes for which you take insulin at home. Your insulin was continued. Your A1c, which is a marker of how well your diabetes is well controlled, was elevated at 9.5. In your case your sugars remain high likely because of the chronic steroids you take for your bronchiectasis. Your rehab facility will continue to monitor your blood sugars daily to ensure that you are on the appropriate regimen to keep your blood sugars at a healthy level. Please make sure to follow up with your primary care doctor regarding your diabetes. Please return to the hospital if you have increased thirst, fatigue, belly pain, nausea, vomiting, confusion, dehydration.

## 2022-04-06 DIAGNOSIS — R73.9 HYPERGLYCEMIA, UNSPECIFIED: ICD-10-CM

## 2022-04-06 LAB
A FLAVUS AB FLD QL: NEGATIVE — SIGNIFICANT CHANGE UP
A NIGER AB FLD QL: NEGATIVE — SIGNIFICANT CHANGE UP
A NIGER AB FLD QL: NEGATIVE — SIGNIFICANT CHANGE UP
ALBUMIN SERPL ELPH-MCNC: 2.7 G/DL — LOW (ref 3.3–5)
ALP SERPL-CCNC: 116 U/L — SIGNIFICANT CHANGE UP (ref 40–120)
ALT FLD-CCNC: 51 U/L — HIGH (ref 10–45)
ANION GAP SERPL CALC-SCNC: 10 MMOL/L — SIGNIFICANT CHANGE UP (ref 5–17)
AST SERPL-CCNC: 26 U/L — SIGNIFICANT CHANGE UP (ref 10–40)
BILIRUB SERPL-MCNC: 0.3 MG/DL — SIGNIFICANT CHANGE UP (ref 0.2–1.2)
BUN SERPL-MCNC: 13 MG/DL — SIGNIFICANT CHANGE UP (ref 7–23)
CALCIUM SERPL-MCNC: 9.1 MG/DL — SIGNIFICANT CHANGE UP (ref 8.4–10.5)
CHLORIDE SERPL-SCNC: 99 MMOL/L — SIGNIFICANT CHANGE UP (ref 96–108)
CO2 SERPL-SCNC: 29 MMOL/L — SIGNIFICANT CHANGE UP (ref 22–31)
CREAT SERPL-MCNC: 0.51 MG/DL — SIGNIFICANT CHANGE UP (ref 0.5–1.3)
CULTURE RESULTS: SIGNIFICANT CHANGE UP
CULTURE RESULTS: SIGNIFICANT CHANGE UP
EGFR: 98 ML/MIN/1.73M2 — SIGNIFICANT CHANGE UP
GLUCOSE BLDC GLUCOMTR-MCNC: 210 MG/DL — HIGH (ref 70–99)
GLUCOSE BLDC GLUCOMTR-MCNC: 260 MG/DL — HIGH (ref 70–99)
GLUCOSE BLDC GLUCOMTR-MCNC: 262 MG/DL — HIGH (ref 70–99)
GLUCOSE BLDC GLUCOMTR-MCNC: 330 MG/DL — HIGH (ref 70–99)
GLUCOSE SERPL-MCNC: 198 MG/DL — HIGH (ref 70–99)
HCT VFR BLD CALC: 36.1 % — SIGNIFICANT CHANGE UP (ref 34.5–45)
HGB BLD-MCNC: 10.7 G/DL — LOW (ref 11.5–15.5)
MAGNESIUM SERPL-MCNC: 1.8 MG/DL — SIGNIFICANT CHANGE UP (ref 1.6–2.6)
MCHC RBC-ENTMCNC: 22 PG — LOW (ref 27–34)
MCHC RBC-ENTMCNC: 29.6 GM/DL — LOW (ref 32–36)
MCV RBC AUTO: 74.3 FL — LOW (ref 80–100)
NRBC # BLD: 4 /100 WBCS — HIGH (ref 0–0)
PHOSPHATE SERPL-MCNC: 2.2 MG/DL — LOW (ref 2.5–4.5)
PLATELET # BLD AUTO: 394 K/UL — SIGNIFICANT CHANGE UP (ref 150–400)
POTASSIUM SERPL-MCNC: 4.7 MMOL/L — SIGNIFICANT CHANGE UP (ref 3.5–5.3)
POTASSIUM SERPL-SCNC: 4.7 MMOL/L — SIGNIFICANT CHANGE UP (ref 3.5–5.3)
PROT SERPL-MCNC: 6.2 G/DL — SIGNIFICANT CHANGE UP (ref 6–8.3)
RBC # BLD: 4.86 M/UL — SIGNIFICANT CHANGE UP (ref 3.8–5.2)
RBC # FLD: 19.4 % — HIGH (ref 10.3–14.5)
SARS-COV-2 RNA SPEC QL NAA+PROBE: SIGNIFICANT CHANGE UP
SODIUM SERPL-SCNC: 138 MMOL/L — SIGNIFICANT CHANGE UP (ref 135–145)
SPECIMEN SOURCE: SIGNIFICANT CHANGE UP
SPECIMEN SOURCE: SIGNIFICANT CHANGE UP
VANCOMYCIN TROUGH SERPL-MCNC: 20.1 UG/ML — HIGH (ref 10–20)
WBC # BLD: 11.01 K/UL — HIGH (ref 3.8–10.5)
WBC # FLD AUTO: 11.01 K/UL — HIGH (ref 3.8–10.5)

## 2022-04-06 PROCEDURE — 99233 SBSQ HOSP IP/OBS HIGH 50: CPT | Mod: GC

## 2022-04-06 PROCEDURE — 99232 SBSQ HOSP IP/OBS MODERATE 35: CPT

## 2022-04-06 RX ORDER — SODIUM CHLORIDE 9 MG/ML
1000 INJECTION, SOLUTION INTRAVENOUS
Refills: 0 | Status: DISCONTINUED | OUTPATIENT
Start: 2022-04-06 | End: 2022-04-07

## 2022-04-06 RX ORDER — INSULIN LISPRO 100/ML
9 VIAL (ML) SUBCUTANEOUS
Refills: 0 | Status: DISCONTINUED | OUTPATIENT
Start: 2022-04-06 | End: 2022-04-07

## 2022-04-06 RX ORDER — INSULIN GLARGINE 100 [IU]/ML
20 INJECTION, SOLUTION SUBCUTANEOUS AT BEDTIME
Refills: 0 | Status: DISCONTINUED | OUTPATIENT
Start: 2022-04-06 | End: 2022-04-07

## 2022-04-06 RX ORDER — INSULIN LISPRO 100/ML
10 VIAL (ML) SUBCUTANEOUS
Refills: 0 | Status: DISCONTINUED | OUTPATIENT
Start: 2022-04-06 | End: 2022-04-06

## 2022-04-06 RX ORDER — GLUCAGON INJECTION, SOLUTION 0.5 MG/.1ML
1 INJECTION, SOLUTION SUBCUTANEOUS ONCE
Refills: 0 | Status: DISCONTINUED | OUTPATIENT
Start: 2022-04-06 | End: 2022-04-07

## 2022-04-06 RX ORDER — DEXTROSE 50 % IN WATER 50 %
12.5 SYRINGE (ML) INTRAVENOUS ONCE
Refills: 0 | Status: DISCONTINUED | OUTPATIENT
Start: 2022-04-06 | End: 2022-04-07

## 2022-04-06 RX ORDER — DEXTROSE 50 % IN WATER 50 %
25 SYRINGE (ML) INTRAVENOUS ONCE
Refills: 0 | Status: DISCONTINUED | OUTPATIENT
Start: 2022-04-06 | End: 2022-04-07

## 2022-04-06 RX ORDER — DEXTROSE 50 % IN WATER 50 %
15 SYRINGE (ML) INTRAVENOUS ONCE
Refills: 0 | Status: DISCONTINUED | OUTPATIENT
Start: 2022-04-06 | End: 2022-04-07

## 2022-04-06 RX ADMIN — Medication 3: at 08:39

## 2022-04-06 RX ADMIN — Medication 3 MILLILITER(S): at 23:49

## 2022-04-06 RX ADMIN — Medication 3 MILLILITER(S): at 17:05

## 2022-04-06 RX ADMIN — Medication 1 TABLET(S): at 11:41

## 2022-04-06 RX ADMIN — INSULIN GLARGINE 20 UNIT(S): 100 INJECTION, SOLUTION SUBCUTANEOUS at 21:54

## 2022-04-06 RX ADMIN — APIXABAN 5 MILLIGRAM(S): 2.5 TABLET, FILM COATED ORAL at 05:06

## 2022-04-06 RX ADMIN — Medication 500 MILLIGRAM(S): at 11:41

## 2022-04-06 RX ADMIN — Medication 3 MILLILITER(S): at 05:05

## 2022-04-06 RX ADMIN — Medication 3 MILLILITER(S): at 11:41

## 2022-04-06 RX ADMIN — SODIUM CHLORIDE 4 MILLILITER(S): 9 INJECTION INTRAMUSCULAR; INTRAVENOUS; SUBCUTANEOUS at 05:05

## 2022-04-06 RX ADMIN — ATORVASTATIN CALCIUM 20 MILLIGRAM(S): 80 TABLET, FILM COATED ORAL at 21:55

## 2022-04-06 RX ADMIN — APIXABAN 5 MILLIGRAM(S): 2.5 TABLET, FILM COATED ORAL at 17:05

## 2022-04-06 RX ADMIN — ZINC SULFATE TAB 220 MG (50 MG ZINC EQUIVALENT) 220 MILLIGRAM(S): 220 (50 ZN) TAB at 11:42

## 2022-04-06 RX ADMIN — Medication 150 MILLIGRAM(S): at 17:02

## 2022-04-06 RX ADMIN — Medication 150 MILLIGRAM(S): at 05:06

## 2022-04-06 RX ADMIN — POLYETHYLENE GLYCOL 3350 17 GRAM(S): 17 POWDER, FOR SOLUTION ORAL at 17:05

## 2022-04-06 RX ADMIN — MEXILETINE HYDROCHLORIDE 200 MILLIGRAM(S): 150 CAPSULE ORAL at 11:41

## 2022-04-06 RX ADMIN — SODIUM CHLORIDE 4 MILLILITER(S): 9 INJECTION INTRAMUSCULAR; INTRAVENOUS; SUBCUTANEOUS at 17:05

## 2022-04-06 RX ADMIN — Medication 250 MILLIGRAM(S): at 11:45

## 2022-04-06 RX ADMIN — PANTOPRAZOLE SODIUM 40 MILLIGRAM(S): 20 TABLET, DELAYED RELEASE ORAL at 05:06

## 2022-04-06 RX ADMIN — MEXILETINE HYDROCHLORIDE 200 MILLIGRAM(S): 150 CAPSULE ORAL at 05:05

## 2022-04-06 RX ADMIN — Medication 4: at 11:39

## 2022-04-06 RX ADMIN — Medication 9 UNIT(S): at 17:03

## 2022-04-06 RX ADMIN — ONDANSETRON 4 MILLIGRAM(S): 8 TABLET, FILM COATED ORAL at 00:46

## 2022-04-06 RX ADMIN — MEXILETINE HYDROCHLORIDE 200 MILLIGRAM(S): 150 CAPSULE ORAL at 21:55

## 2022-04-06 RX ADMIN — Medication 250 MILLIGRAM(S): at 00:15

## 2022-04-06 RX ADMIN — Medication 3: at 17:02

## 2022-04-06 RX ADMIN — CHLORHEXIDINE GLUCONATE 1 APPLICATION(S): 213 SOLUTION TOPICAL at 08:28

## 2022-04-06 RX ADMIN — Medication 3 MILLILITER(S): at 00:15

## 2022-04-06 RX ADMIN — Medication 8 MILLIGRAM(S): at 05:05

## 2022-04-06 NOTE — PROGRESS NOTE ADULT - ASSESSMENT
72 y/o F w/tracheobronchomalacia s/p tracheobronchoplasty, severe persistent asthma, bronchiectasis, and colon cancer s/p colectomy admitted with likely exacerbation of bronchiectasis due to pneumonia.    - Supplemental O2 as needed goal O2 sat >= 90%  - Broad spectrum abx including pseudomonal coverage  - Airway clearance, acapella device, bronchodilators, chest PT  - Continue home asthma regimen  - Repeat CT scan in 3 months for follow up of new pulmonary nodule  *******************************************  3/29-no signif changes-ID f/up                                          SEE BELOW:  3/30-ENT evaln= fungus, ID f/up, diflucan in place; ? need for fob here  3/31-no new issues-weakness continues  4/1-tx to isolation room-ID evaln-MRSA and pseudomonas as well as achromobacter and kleb agree with vancomycin and cefepime for now  4/4-ID f/up-improved over all, continue on current abx as per ID  4/5-ID f/up-off cefepime--vanco to continue/ fluconazole  4/6-ENT and ID f/up, improving

## 2022-04-06 NOTE — PROGRESS NOTE ADULT - PROBLEM SELECTOR PLAN 6
- Last A1c 7.8. Pt uses lantus 15 u qhs and Novolog varying dose depending on sugar, however was unable to elaborate on a usual dose of Novolog. Has been eating pureed diet due to weakness since last hospital stay  - Lantus 14 + Admelog 7 TID  - low dose ISS  - Monitor fingersticks for goal 140-180 - Not hypotensive, however low threshold for stress dose steroids if hypotensive given severe sepsis  - Cont home steroids

## 2022-04-06 NOTE — PROGRESS NOTE ADULT - PROBLEM SELECTOR PLAN 10
DVT: Eliquis  Diet: pureed, thin liquids, consistent carb diet, glucerna shake 3/day, zinc, MVI, and vitamin C.  speech/swallow eval passed, nutrition consult, appreciate recs  PT: [facility]  LDA: colostomy, PIV  Dispo: [facility] pending completion of abx regimen. [facility] patient will establish follow up appt with pulm  Code: Full code - Started after extubation at [facility], improved likely 2/2 thrush  - ENT eval, appreciate assistance: laryngoscope at bedside with diffuse yellow patches of debris ?thrush. plan to rescope on 4/5: no thrush, normal structure and vocal cord mobility  - diflucan 200 mg IV first dose, then diflucan 100 mg IV x6 days then ENT will rescope after treatment (), increased to 200 mg IV per ID recs  - FEES scan: pureed diet and thin liquids, continued ST for dysphagia rehab

## 2022-04-06 NOTE — PROGRESS NOTE ADULT - PROBLEM SELECTOR PLAN 8
- 2018, s/p total colectomy and chemotherapy. Last CT Feb 2022 without evidence of metastatic disease or pathologic adenopathy  - CTM - EKG showing sinus tachycardia, likely in setting of infection and pulmonary pathology  - Cont Eliquis 5 mg BID  - Cont home mexiletine 200 mg TID

## 2022-04-06 NOTE — PROGRESS NOTE ADULT - PROBLEM SELECTOR PLAN 7
- EKG showing sinus tachycardia, likely in setting of infection and pulmonary pathology  - Cont Eliquis 5 mg BID  - Cont home mexiletine 200 mg TID - Last A1c 7.8. Pt uses lantus 15 u qhs and Novolog varying dose depending on sugar, however was unable to elaborate on a usual dose of Novolog. Has been eating pureed diet due to weakness since last hospital stay  - Lantus 14 + Admelog 7 TID  - low dose ISS  - Monitor fingersticks for goal 140-180

## 2022-04-06 NOTE — PROGRESS NOTE ADULT - ASSESSMENT
70 yo F with history of tracheobronchomalasia s/p tracheobronchoplasty, bronchiectasis, severe persistent asthma (steroid dependent), adrenal insuffiencey on chronic steroids, DM2, HTN, colorectal cancer s/p total colectomy now with colostomy, with 3 weeks of productive cough, bilous vomiting, shortness of breath and lethargy.   At OSH March 8th and treated for pna, given antibiotics, stay c/b intubation 2/2 hypoxia, and discharged on Friday March 25th   At rehab, SOB, cough, fever, then presents to ED for further care  Note presence of mediport  UA+, yeast cells  RVP neg  Possible thrush seen on laryngoscope--given Fluconazole  3/31 pt with wound care consult: Skin:  Sacral/bilateral buttocks with central deep maroon discoloration and peripheral superficially denuded skin with skin bridges L 5cm X W 5cm xD 0.1cm with pink wound bed, no necrotic tissue, and scant serosanguinous drainage, +TTP  Ongoing fevers  BCX with CoNS 1/4  UA+, with low CFU glabrata  3/29 Sputum CX MRSA  CT with area of consolidation in setting bronchiectasis  Uncertain cause fever--BCX is likely contam; chest imaging possible PNA with MRSA in sputum? Lower suspicion for fungal UTI, but note presence of low CFU glabrata  Continue empiric coverage for now      Overall, Fever, positive culture finding, bandemia, elevated LFTs  concern for pneumonia, MRSA infection     PLAN;      - s/p cefepime   - fungal studies--fungitell negative, aspergillus negative  - Wound care to sacral wound  - F/U repeat BCXs (I suspect CoNS is contam)  - no fevers since Mach 30th  - repeat sputum culture    - day # 10 vancomycin-  level is high  can d/c now  - s/p  diflucan- would d/c after today   -steroids per pulmonary    Ashley Liz M.D. ,   please reach via teams   If no answer, or after 5PM/ weekends,  then please call  [phone]    Assessment and plan discussed with the primary team .      ID will follow the patient PRN. Please recontact ID if we can be of further assistance . [phone]

## 2022-04-06 NOTE — PROGRESS NOTE ADULT - PROBLEM SELECTOR PLAN 4
- Likely exacerbation in setting of sepsis. S/p tracheoplasty with residual frequent mucus production, now improved  - Cont IV antibiotics with Pseudomonal coverage for likely acute exacerbation  - Cont home steroids for severe persistent asthma and adrenal insufficiency  - Pt uses nebulizer treatment at home, duonebs q6h standing. ensure patient is sitting upright for all inhaled meds due to hx of tracheobronchomalacia  - Cont Spiriva  - Incentive spirometry  - Pulmonary (Dr. Allison) following - patient is s/p colostomy after colon cancer s/p total colectomy.  - reportedly with constipation and decreased colostomy output after holding home lactulose, now improved  - Output noted from ostomy for first time in 2 days - small solid clumps, now pasty  - c/w home lactulose  - c/w miralax and senna  - serial abdominal exam  - can consider GI series if without resolution

## 2022-04-06 NOTE — DISCHARGE NOTE PROVIDER - YES NO FOR MLM POSITIVE OR NEGATIVE COVID RESULT
[FreeTextEntry1] : 3-year-old female with several months of periumbilical abdominal pain several times a week.  She does report difficulty passing stool suggestive of incomplete evacuation. She is no longer having abd pain   Recommend:\par -Labs which can be done at the pediatrician at the next well check, if they are not doing labs, they can hold on to the slip and do the labs if the pain returns. \par -Start Benefiber, 2 teaspoons/day in water\par - increase fiber and fluids in diet \par -Discussed with mom that if she is not feeling better with the Benefiber they can try discontinuing lactose from the diet\par - Family instructed to call for lab results or if any questions or concerns. Family was asked to make a follow-up visit if the pain returns
,

## 2022-04-06 NOTE — PROGRESS NOTE ADULT - PROBLEM SELECTOR PLAN 5
- Not hypotensive, however low threshold for stress dose steroids if hypotensive given severe sepsis  - Cont home steroids - Likely exacerbation in setting of sepsis. S/p tracheoplasty with residual frequent mucus production, now improved  - Cont IV antibiotics with Pseudomonal coverage for likely acute exacerbation  - Cont home steroids for severe persistent asthma and adrenal insufficiency  - Pt uses nebulizer treatment at home, duonebs q6h standing. ensure patient is sitting upright for all inhaled meds due to hx of tracheobronchomalacia  - Cont Spiriva  - Incentive spirometry  - Pulmonary (Dr. Allison) following

## 2022-04-06 NOTE — PROGRESS NOTE ADULT - PROBLEM SELECTOR PLAN 9
- Started after extubation at [facility], improved likely 2/2 thrush  - ENT eval, appreciate assistance: laryngoscope at bedside with diffuse yellow patches of debris ?thrush. plan to rescope on 4/5: no thrush, normal structure and vocal cord mobility  - diflucan 200 mg IV first dose, then diflucan 100 mg IV x6 days then ENT will rescope after treatment (), increased to 200 mg IV per ID recs  - FEES scan: pureed diet and thin liquids, continued ST for dysphagia rehab - 2018, s/p total colectomy and chemotherapy. Last CT Feb 2022 without evidence of metastatic disease or pathologic adenopathy  - CTM

## 2022-04-06 NOTE — PROGRESS NOTE ADULT - SUBJECTIVE AND OBJECTIVE BOX
CHIEF COMPLAINT: f/up sob, chronic resp failure, TBM, severe asthma, allergic rhinitis-voice improved, less sob and cough    Interval Events: ID, ENT f/up    REVIEW OF SYSTEMS:  Constitutional: No fevers or chills. No weight loss. + fatigue or generalized malaise.  Eyes: No itching or discharge from the eyes  ENT: No ear pain. No ear discharge. No nasal congestion. No post nasal drip. No epistaxis. No throat pain. No sore throat. No difficulty swallowing.   CV: No chest pain. No palpitations. No lightheadedness or dizziness.   Resp: No dyspnea at rest. No dyspnea on exertion. No orthopnea. No wheezing. + cough. No stridor. No sputum production. No chest pain with respiration.  GI: No nausea. No vomiting. No diarrhea.  MSK: No joint pain or pain in any extremities  Integumentary: No skin lesions. No pedal edema.  Neurological: + gross motor weakness. No sensory changes.  [+ ] All other systems negative  [ ] Unable to assess ROS because ________    OBJECTIVE:  ICU Vital Signs Last 24 Hrs  T(C): 36.3 (06 Apr 2022 04:40), Max: 37.4 (05 Apr 2022 21:20)  T(F): 97.3 (06 Apr 2022 04:40), Max: 99.3 (05 Apr 2022 21:20)  HR: 98 (06 Apr 2022 04:40) (98 - 105)  BP: 119/69 (06 Apr 2022 04:40) (119/69 - 123/74)  BP(mean): --  ABP: --  ABP(mean): --  RR: 18 (06 Apr 2022 04:40) (18 - 18)  SpO2: 95% (06 Apr 2022 04:40) (91% - 97%)        04-04 @ 07:01  -  04-05 @ 07:00  --------------------------------------------------------  IN: 970 mL / OUT: 740 mL / NET: 230 mL    04-05 @ 07:01  -  04-06 @ 05:21  --------------------------------------------------------  IN: 1374 mL / OUT: 975 mL / NET: 399 mL      CAPILLARY BLOOD GLUCOSE      POCT Blood Glucose.: 220 mg/dL (05 Apr 2022 21:12)      PHYSICAL EXAM: NAD in bed on RA  General: Awake, alert, oriented X 3.   HEENT: Atraumatic, normocephalic.                 Mallampatti Grade 2                No nasal congestion.                No tonsillar or pharyngeal exudates.  Lymph Nodes: No palpable lymphadenopathy  Neck: No JVD. No carotid bruit.   Respiratory: Normal chest expansion                         Normal percussion                         Normal and equal air entry                         + mild wheeze, no rhonchi or rales.  Cardiovascular: S1 S2 normal. No murmurs, rubs or gallops.   Abdomen: Soft, non-tender, non-distended. No organomegaly. Normoactive bowel sounds.  Extremities: Warm to touch. Peripheral pulse palpable. No pedal edema.   Skin: No rashes or skin lesions except decub  Neurological: Motor and sensory examination equal and normal in all four extremities.  Psychiatry: Appropriate mood and affect.    HOSPITAL MEDICATIONS:  MEDICATIONS  (STANDING):  albuterol/ipratropium for Nebulization 3 milliLiter(s) Nebulizer every 6 hours  apixaban 5 milliGRAM(s) Oral every 12 hours  ascorbic acid 500 milliGRAM(s) Oral daily  atorvastatin 20 milliGRAM(s) Oral at bedtime  chlorhexidine 2% Cloths 1 Application(s) Topical <User Schedule>  dextrose 5%. 1000 milliLiter(s) (50 mL/Hr) IV Continuous <Continuous>  dextrose 5%. 1000 milliLiter(s) (50 mL/Hr) IV Continuous <Continuous>  dextrose 5%. 1000 milliLiter(s) (100 mL/Hr) IV Continuous <Continuous>  dextrose 5%. 1000 milliLiter(s) (100 mL/Hr) IV Continuous <Continuous>  dextrose 50% Injectable 25 Gram(s) IV Push once  dextrose 50% Injectable 25 Gram(s) IV Push once  dextrose 50% Injectable 12.5 Gram(s) IV Push once  dextrose 50% Injectable 25 Gram(s) IV Push once  dextrose 50% Injectable 25 Gram(s) IV Push once  dextrose 50% Injectable 12.5 Gram(s) IV Push once  glucagon  Injectable 1 milliGRAM(s) IntraMuscular once  glucagon  Injectable 1 milliGRAM(s) IntraMuscular once  influenza  Vaccine (HIGH DOSE) 0.7 milliLiter(s) IntraMuscular once  insulin glargine Injectable (LANTUS) 14 Unit(s) SubCutaneous at bedtime  insulin lispro (ADMELOG) corrective regimen sliding scale   SubCutaneous three times a day before meals  insulin lispro (ADMELOG) corrective regimen sliding scale   SubCutaneous at bedtime  insulin lispro Injectable (ADMELOG) 7 Unit(s) SubCutaneous three times a day before meals  lactulose Syrup 15 Gram(s) Oral daily  methylPREDNISolone 8 milliGRAM(s) Oral daily  mexiletine 200 milliGRAM(s) Oral three times a day  multivitamin 1 Tablet(s) Oral daily  pantoprazole    Tablet 40 milliGRAM(s) Oral before breakfast  polyethylene glycol 3350 17 Gram(s) Oral two times a day  pregabalin 150 milliGRAM(s) Oral two times a day  senna 2 Tablet(s) Oral at bedtime  sodium chloride 7% Inhalation 4 milliLiter(s) Inhalation every 12 hours  vancomycin  IVPB 1000 milliGRAM(s) IV Intermittent every 12 hours  zinc sulfate 220 milliGRAM(s) Oral daily    MEDICATIONS  (PRN):  acetaminophen     Tablet .. 975 milliGRAM(s) Oral every 6 hours PRN Temp greater or equal to 38C (100.4F), Mild Pain (1 - 3)  aluminum hydroxide/magnesium hydroxide/simethicone Suspension 30 milliLiter(s) Oral every 4 hours PRN Dyspepsia  dextrose Oral Gel 15 Gram(s) Oral once PRN Blood Glucose LESS THAN 70 milliGRAM(s)/deciliter  dextrose Oral Gel 15 Gram(s) Oral once PRN Blood Glucose LESS THAN 70 milliGRAM(s)/deciliter  ondansetron Injectable 4 milliGRAM(s) IV Push every 8 hours PRN Nausea and/or Vomiting      LABS:                        10.8   12.13 )-----------( 365      ( 05 Apr 2022 09:51 )             35.6     04-05    136  |  99  |  11  ----------------------------<  259<H>  5.7<H>   |  29  |  0.41<L>    Ca    8.9      05 Apr 2022 09:51  Phos  2.6     04-05  Mg     1.8     04-05    TPro  6.4  /  Alb  2.7<L>  /  TBili  0.3  /  DBili  x   /  AST  54<H>  /  ALT  57<H>  /  AlkPhos  114  04-05              MICROBIOLOGY:     RADIOLOGY:  [ ] Reviewed and interpreted by me    Point of Care Ultrasound Findings:    PFT:    EKG:

## 2022-04-06 NOTE — PROGRESS NOTE ADULT - SUBJECTIVE AND OBJECTIVE BOX
PROGRESS NOTE:   Authored by Raissa Nguyen MD  Internal Medicine      Patient is a 71y old  Female who presents with a chief complaint of sepsis (06 Apr 2022 05:21)      SUBJECTIVE / OVERNIGHT EVENTS: NAEO, patient seen and examined at bedside    MEDICATIONS  (STANDING):  albuterol/ipratropium for Nebulization 3 milliLiter(s) Nebulizer every 6 hours  apixaban 5 milliGRAM(s) Oral every 12 hours  ascorbic acid 500 milliGRAM(s) Oral daily  atorvastatin 20 milliGRAM(s) Oral at bedtime  chlorhexidine 2% Cloths 1 Application(s) Topical <User Schedule>  dextrose 5%. 1000 milliLiter(s) (50 mL/Hr) IV Continuous <Continuous>  dextrose 5%. 1000 milliLiter(s) (50 mL/Hr) IV Continuous <Continuous>  dextrose 5%. 1000 milliLiter(s) (100 mL/Hr) IV Continuous <Continuous>  dextrose 5%. 1000 milliLiter(s) (100 mL/Hr) IV Continuous <Continuous>  dextrose 50% Injectable 25 Gram(s) IV Push once  dextrose 50% Injectable 25 Gram(s) IV Push once  dextrose 50% Injectable 12.5 Gram(s) IV Push once  dextrose 50% Injectable 25 Gram(s) IV Push once  dextrose 50% Injectable 25 Gram(s) IV Push once  dextrose 50% Injectable 12.5 Gram(s) IV Push once  glucagon  Injectable 1 milliGRAM(s) IntraMuscular once  glucagon  Injectable 1 milliGRAM(s) IntraMuscular once  influenza  Vaccine (HIGH DOSE) 0.7 milliLiter(s) IntraMuscular once  insulin glargine Injectable (LANTUS) 14 Unit(s) SubCutaneous at bedtime  insulin lispro (ADMELOG) corrective regimen sliding scale   SubCutaneous three times a day before meals  insulin lispro (ADMELOG) corrective regimen sliding scale   SubCutaneous at bedtime  insulin lispro Injectable (ADMELOG) 7 Unit(s) SubCutaneous three times a day before meals  lactulose Syrup 15 Gram(s) Oral daily  methylPREDNISolone 8 milliGRAM(s) Oral daily  mexiletine 200 milliGRAM(s) Oral three times a day  multivitamin 1 Tablet(s) Oral daily  pantoprazole    Tablet 40 milliGRAM(s) Oral before breakfast  polyethylene glycol 3350 17 Gram(s) Oral two times a day  pregabalin 150 milliGRAM(s) Oral two times a day  senna 2 Tablet(s) Oral at bedtime  sodium chloride 7% Inhalation 4 milliLiter(s) Inhalation every 12 hours  vancomycin  IVPB 1000 milliGRAM(s) IV Intermittent every 12 hours  zinc sulfate 220 milliGRAM(s) Oral daily    MEDICATIONS  (PRN):  acetaminophen     Tablet .. 975 milliGRAM(s) Oral every 6 hours PRN Temp greater or equal to 38C (100.4F), Mild Pain (1 - 3)  aluminum hydroxide/magnesium hydroxide/simethicone Suspension 30 milliLiter(s) Oral every 4 hours PRN Dyspepsia  dextrose Oral Gel 15 Gram(s) Oral once PRN Blood Glucose LESS THAN 70 milliGRAM(s)/deciliter  dextrose Oral Gel 15 Gram(s) Oral once PRN Blood Glucose LESS THAN 70 milliGRAM(s)/deciliter  ondansetron Injectable 4 milliGRAM(s) IV Push every 8 hours PRN Nausea and/or Vomiting      CAPILLARY BLOOD GLUCOSE      POCT Blood Glucose.: 220 mg/dL (05 Apr 2022 21:12)  POCT Blood Glucose.: 306 mg/dL (05 Apr 2022 17:20)  POCT Blood Glucose.: 287 mg/dL (05 Apr 2022 11:51)  POCT Blood Glucose.: 219 mg/dL (05 Apr 2022 08:12)    I&O's Summary    05 Apr 2022 07:01  -  06 Apr 2022 07:00  --------------------------------------------------------  IN: 1374 mL / OUT: 975 mL / NET: 399 mL        PHYSICAL EXAM:  Vital Signs Last 24 Hrs  T(C): 36.3 (06 Apr 2022 04:40), Max: 37.4 (05 Apr 2022 21:20)  T(F): 97.3 (06 Apr 2022 04:40), Max: 99.3 (05 Apr 2022 21:20)  HR: 98 (06 Apr 2022 04:40) (98 - 105)  BP: 119/69 (06 Apr 2022 04:40) (119/69 - 123/74)  BP(mean): --  RR: 18 (06 Apr 2022 07:30) (18 - 18)  SpO2: 95% (06 Apr 2022 04:40) (91% - 97%)  CONSTITUTIONAL: Well-groomed, in no apparent distress  EYES: No conjunctival or scleral injection, non-icteric;   ENMT: No external nasal lesions; MMM  NECK: Trachea midline without palpable neck mass; thyroid not enlarged and non-tender  RESPIRATORY: Breathing comfortably; no dullness to percussion; lungs CTA without wheeze/rhonchi/rales  CARDIOVASCULAR: +S1S2, RRR, no M/G/R; pedal pulses full and symmetric; no lower extremity edema  GASTROINTESTINAL: No palpable masses or tenderness, +BS throughout, no rebound/guarding; no hepatosplenomegaly; no hernia palpated  LYMPHATIC: No cervical LAD or tenderness  SKIN: No rashes or ulcers noted  NEUROLOGIC: CN II-XII intact; sensation intact in LEs b/l to light touch  PSYCHIATRIC: A+O x 3; mood and affect appropriate; appropriate insight and judgment    LABS:                        10.7   11.01 )-----------( 394      ( 06 Apr 2022 06:49 )             36.1     04-06    138  |  99  |  13  ----------------------------<  198<H>  4.7   |  29  |  0.51    Ca    9.1      06 Apr 2022 06:49  Phos  2.2     04-06  Mg     1.8     04-06    TPro  6.2  /  Alb  2.7<L>  /  TBili  0.3  /  DBili  x   /  AST  26  /  ALT  51<H>  /  AlkPhos  116  04-06                RADIOLOGY & ADDITIONAL TESTS:  Results Reviewed:   Imaging Personally Reviewed:  Electrocardiogram Personally Reviewed:    COORDINATION OF CARE:  Care Discussed with Consultants/Other Providers [Y/N]:  Prior or Outpatient Records Reviewed [Y/N]:   PROGRESS NOTE:   Authored by Raissa Nguyen MD  Internal Medicine    Patient is a 71y old  Female who presents with a chief complaint of sepsis (06 Apr 2022 05:21)    SUBJECTIVE / OVERNIGHT EVENTS: CHRISTIANEEO, patient seen and examined at bedside. reports that she continues to have purulent cough, however it is improved overall. coughing less frequently. no blood in sputum. belly pain is improved.     MEDICATIONS  (STANDING):  albuterol/ipratropium for Nebulization 3 milliLiter(s) Nebulizer every 6 hours  apixaban 5 milliGRAM(s) Oral every 12 hours  ascorbic acid 500 milliGRAM(s) Oral daily  atorvastatin 20 milliGRAM(s) Oral at bedtime  chlorhexidine 2% Cloths 1 Application(s) Topical <User Schedule>  dextrose 5%. 1000 milliLiter(s) (50 mL/Hr) IV Continuous <Continuous>  dextrose 5%. 1000 milliLiter(s) (50 mL/Hr) IV Continuous <Continuous>  dextrose 5%. 1000 milliLiter(s) (100 mL/Hr) IV Continuous <Continuous>  dextrose 5%. 1000 milliLiter(s) (100 mL/Hr) IV Continuous <Continuous>  dextrose 50% Injectable 25 Gram(s) IV Push once  dextrose 50% Injectable 25 Gram(s) IV Push once  dextrose 50% Injectable 12.5 Gram(s) IV Push once  dextrose 50% Injectable 25 Gram(s) IV Push once  dextrose 50% Injectable 25 Gram(s) IV Push once  dextrose 50% Injectable 12.5 Gram(s) IV Push once  glucagon  Injectable 1 milliGRAM(s) IntraMuscular once  glucagon  Injectable 1 milliGRAM(s) IntraMuscular once  influenza  Vaccine (HIGH DOSE) 0.7 milliLiter(s) IntraMuscular once  insulin glargine Injectable (LANTUS) 14 Unit(s) SubCutaneous at bedtime  insulin lispro (ADMELOG) corrective regimen sliding scale   SubCutaneous three times a day before meals  insulin lispro (ADMELOG) corrective regimen sliding scale   SubCutaneous at bedtime  insulin lispro Injectable (ADMELOG) 7 Unit(s) SubCutaneous three times a day before meals  lactulose Syrup 15 Gram(s) Oral daily  methylPREDNISolone 8 milliGRAM(s) Oral daily  mexiletine 200 milliGRAM(s) Oral three times a day  multivitamin 1 Tablet(s) Oral daily  pantoprazole    Tablet 40 milliGRAM(s) Oral before breakfast  polyethylene glycol 3350 17 Gram(s) Oral two times a day  pregabalin 150 milliGRAM(s) Oral two times a day  senna 2 Tablet(s) Oral at bedtime  sodium chloride 7% Inhalation 4 milliLiter(s) Inhalation every 12 hours  vancomycin  IVPB 1000 milliGRAM(s) IV Intermittent every 12 hours  zinc sulfate 220 milliGRAM(s) Oral daily    MEDICATIONS  (PRN):  acetaminophen     Tablet .. 975 milliGRAM(s) Oral every 6 hours PRN Temp greater or equal to 38C (100.4F), Mild Pain (1 - 3)  aluminum hydroxide/magnesium hydroxide/simethicone Suspension 30 milliLiter(s) Oral every 4 hours PRN Dyspepsia  dextrose Oral Gel 15 Gram(s) Oral once PRN Blood Glucose LESS THAN 70 milliGRAM(s)/deciliter  dextrose Oral Gel 15 Gram(s) Oral once PRN Blood Glucose LESS THAN 70 milliGRAM(s)/deciliter  ondansetron Injectable 4 milliGRAM(s) IV Push every 8 hours PRN Nausea and/or Vomiting      CAPILLARY BLOOD GLUCOSE      POCT Blood Glucose.: 220 mg/dL (05 Apr 2022 21:12)  POCT Blood Glucose.: 306 mg/dL (05 Apr 2022 17:20)  POCT Blood Glucose.: 287 mg/dL (05 Apr 2022 11:51)  POCT Blood Glucose.: 219 mg/dL (05 Apr 2022 08:12)    I&O's Summary    05 Apr 2022 07:01  -  06 Apr 2022 07:00  --------------------------------------------------------  IN: 1374 mL / OUT: 975 mL / NET: 399 mL        PHYSICAL EXAM:  Vital Signs Last 24 Hrs  T(C): 36.3 (06 Apr 2022 04:40), Max: 37.4 (05 Apr 2022 21:20)  T(F): 97.3 (06 Apr 2022 04:40), Max: 99.3 (05 Apr 2022 21:20)  HR: 98 (06 Apr 2022 04:40) (98 - 105)  BP: 119/69 (06 Apr 2022 04:40) (119/69 - 123/74)  BP(mean): --  RR: 18 (06 Apr 2022 07:30) (18 - 18)  SpO2: 95% (06 Apr 2022 04:40) (91% - 97%)  CONSTITUTIONAL: alert, comfortably laying in bed, not in acute distress, conversant,   EYES: No conjunctival or scleral injection, non-icteric;   ENMT: No external nasal lesions; MMM  NECK: Trachea midline without palpable neck mass; thyroid not enlarged and non-tender  RESPIRATORY: Breathing comfortably, speaking in full sentences, not using accessory mm to breathe, no w/r/r  CARDIOVASCULAR: +S1S2, RRR, no M/G/R; pedal pulses full and symmetric; no lower extremity edema  GASTROINTESTINAL: No palpable masses or tenderness, +BS throughout, no rebound/guarding; no hernia palpated, colostomy in place with stool output, pink without erythema.   LYMPHATIC: No cervical LAD or tenderness  NEUROLOGIC: nonfocal, moves all extremities spontaneously,   PSYCHIATRIC: A+O x 3; mood and affect appropriate; appropriate insight and judgment     LABS:                        10.7   11.01 )-----------( 394      ( 06 Apr 2022 06:49 )             36.1     04-06    138  |  99  |  13  ----------------------------<  198<H>  4.7   |  29  |  0.51    Ca    9.1      06 Apr 2022 06:49  Phos  2.2     04-06  Mg     1.8     04-06    TPro  6.2  /  Alb  2.7<L>  /  TBili  0.3  /  DBili  x   /  AST  26  /  ALT  51<H>  /  AlkPhos  116  04-06                RADIOLOGY & ADDITIONAL TESTS:  Results Reviewed:   Imaging Personally Reviewed:  Electrocardiogram Personally Reviewed:    COORDINATION OF CARE:  Care Discussed with Consultants/Other Providers [Y/N]:  Prior or Outpatient Records Reviewed [Y/N]:   PROGRESS NOTE:   Authored by Raissa Nguyen MD  Internal Medicine    Patient is a 71y old  Female who presents with a chief complaint of sepsis (06 Apr 2022 05:21)    SUBJECTIVE / OVERNIGHT EVENTS: CHRISTIANEEO, patient seen and examined at bedside. reports that she continues to have purulent cough, however it is improved overall. coughing less frequently. no blood in sputum. belly pain is improved.     MEDICATIONS  (STANDING):  albuterol/ipratropium for Nebulization 3 milliLiter(s) Nebulizer every 6 hours  apixaban 5 milliGRAM(s) Oral every 12 hours  ascorbic acid 500 milliGRAM(s) Oral daily  atorvastatin 20 milliGRAM(s) Oral at bedtime  chlorhexidine 2% Cloths 1 Application(s) Topical <User Schedule>  dextrose 5%. 1000 milliLiter(s) (50 mL/Hr) IV Continuous <Continuous>  dextrose 5%. 1000 milliLiter(s) (50 mL/Hr) IV Continuous <Continuous>  dextrose 5%. 1000 milliLiter(s) (100 mL/Hr) IV Continuous <Continuous>  dextrose 5%. 1000 milliLiter(s) (100 mL/Hr) IV Continuous <Continuous>  dextrose 50% Injectable 25 Gram(s) IV Push once  dextrose 50% Injectable 25 Gram(s) IV Push once  dextrose 50% Injectable 12.5 Gram(s) IV Push once  dextrose 50% Injectable 25 Gram(s) IV Push once  dextrose 50% Injectable 25 Gram(s) IV Push once  dextrose 50% Injectable 12.5 Gram(s) IV Push once  glucagon  Injectable 1 milliGRAM(s) IntraMuscular once  glucagon  Injectable 1 milliGRAM(s) IntraMuscular once  influenza  Vaccine (HIGH DOSE) 0.7 milliLiter(s) IntraMuscular once  insulin glargine Injectable (LANTUS) 14 Unit(s) SubCutaneous at bedtime  insulin lispro (ADMELOG) corrective regimen sliding scale   SubCutaneous three times a day before meals  insulin lispro (ADMELOG) corrective regimen sliding scale   SubCutaneous at bedtime  insulin lispro Injectable (ADMELOG) 7 Unit(s) SubCutaneous three times a day before meals  lactulose Syrup 15 Gram(s) Oral daily  methylPREDNISolone 8 milliGRAM(s) Oral daily  mexiletine 200 milliGRAM(s) Oral three times a day  multivitamin 1 Tablet(s) Oral daily  pantoprazole    Tablet 40 milliGRAM(s) Oral before breakfast  polyethylene glycol 3350 17 Gram(s) Oral two times a day  pregabalin 150 milliGRAM(s) Oral two times a day  senna 2 Tablet(s) Oral at bedtime  sodium chloride 7% Inhalation 4 milliLiter(s) Inhalation every 12 hours  vancomycin  IVPB 1000 milliGRAM(s) IV Intermittent every 12 hours  zinc sulfate 220 milliGRAM(s) Oral daily    MEDICATIONS  (PRN):  acetaminophen     Tablet .. 975 milliGRAM(s) Oral every 6 hours PRN Temp greater or equal to 38C (100.4F), Mild Pain (1 - 3)  aluminum hydroxide/magnesium hydroxide/simethicone Suspension 30 milliLiter(s) Oral every 4 hours PRN Dyspepsia  dextrose Oral Gel 15 Gram(s) Oral once PRN Blood Glucose LESS THAN 70 milliGRAM(s)/deciliter  dextrose Oral Gel 15 Gram(s) Oral once PRN Blood Glucose LESS THAN 70 milliGRAM(s)/deciliter  ondansetron Injectable 4 milliGRAM(s) IV Push every 8 hours PRN Nausea and/or Vomiting      CAPILLARY BLOOD GLUCOSE      POCT Blood Glucose.: 220 mg/dL (05 Apr 2022 21:12)  POCT Blood Glucose.: 306 mg/dL (05 Apr 2022 17:20)  POCT Blood Glucose.: 287 mg/dL (05 Apr 2022 11:51)  POCT Blood Glucose.: 219 mg/dL (05 Apr 2022 08:12)    I&O's Summary    05 Apr 2022 07:01  -  06 Apr 2022 07:00  --------------------------------------------------------  IN: 1374 mL / OUT: 975 mL / NET: 399 mL        PHYSICAL EXAM:  Vital Signs Last 24 Hrs  T(C): 36.3 (06 Apr 2022 04:40), Max: 37.4 (05 Apr 2022 21:20)  T(F): 97.3 (06 Apr 2022 04:40), Max: 99.3 (05 Apr 2022 21:20)  HR: 98 (06 Apr 2022 04:40) (98 - 105)  BP: 119/69 (06 Apr 2022 04:40) (119/69 - 123/74)  BP(mean): --  RR: 18 (06 Apr 2022 07:30) (18 - 18)  SpO2: 95% (06 Apr 2022 04:40) (91% - 97%)  CONSTITUTIONAL: alert, comfortably laying in bed, not in acute distress, conversant,   EYES: No conjunctival or scleral injection, non-icteric;   ENMT: No external nasal lesions; MMM  NECK: Trachea midline without palpable neck mass; thyroid not enlarged and non-tender  RESPIRATORY: Breathing comfortably, speaking in full sentences, not using accessory mm to breathe, +rhonchi bilaterally, no wheezes or no crackles  CARDIOVASCULAR: +S1S2, RRR, no M/G/R; pedal pulses full and symmetric; no lower extremity edema  GASTROINTESTINAL: No palpable masses or tenderness, +BS throughout, no rebound/guarding; no hernia palpated, colostomy in place with stool output, pink without erythema.   LYMPHATIC: No cervical LAD or tenderness  NEUROLOGIC: nonfocal, moves all extremities spontaneously,   PSYCHIATRIC: A+O x 3; mood and affect appropriate; appropriate insight and judgment     LABS:                        10.7   11.01 )-----------( 394      ( 06 Apr 2022 06:49 )             36.1     04-06    138  |  99  |  13  ----------------------------<  198<H>  4.7   |  29  |  0.51    Ca    9.1      06 Apr 2022 06:49  Phos  2.2     04-06  Mg     1.8     04-06    TPro  6.2  /  Alb  2.7<L>  /  TBili  0.3  /  DBili  x   /  AST  26  /  ALT  51<H>  /  AlkPhos  116  04-06                RADIOLOGY & ADDITIONAL TESTS:  Results Reviewed:   Imaging Personally Reviewed:  Electrocardiogram Personally Reviewed:    COORDINATION OF CARE:  Care Discussed with Consultants/Other Providers [Y/N]:  Prior or Outpatient Records Reviewed [Y/N]:

## 2022-04-06 NOTE — PROGRESS NOTE ADULT - PROBLEM SELECTOR PLAN 1
- Febrile, tachycardia, tachypnea, lactate 2.1. Associated AHRF, productive cough. Recent hospital stay with intubation for pneumonia at OSH and intubated, unclear which antibiotics were given and duration of treatment. Likely in setting of pneumonia and bronchiectasis exacerbation. History of sputum Cx +Pseudomonas (Feb 2020) via bronchoscopy. Also with UA grossly positive, however pt without symptoms  - S/p cefepime and vancomycin in ED, 500 cc bolus, MRSA+, RVP neg  - cefepime for Pseudomonas coverage (3/29-4/3)  - vancomycin given recent intubation (3/29 -4/6 ), diflucan (3/30-4/5)  - BCx 3/30 with MRSE in 1/2 bottles, likely contaminant  - UCx <50K candida glabrata  - Sputum Cx +mrsa  - fungitell neg  - aspergillus abs, galactomannan neg  - EKG, monitor QTC interval  - Bcx repeat until cleared - BCx 4/1 NGTD  - vanc trough, prior to every 4th dose  - ID consult, appreciate recs - Febrile, tachycardia, tachypnea, lactate 2.1. Associated AHRF, productive cough. Recent hospital stay with intubation for pneumonia at OSH and intubated, unclear which antibiotics were given and duration of treatment. Likely in setting of pneumonia and bronchiectasis exacerbation. History of sputum Cx +Pseudomonas (Feb 2020) via bronchoscopy. Also with UA grossly positive, however pt without symptoms  - S/p cefepime and vancomycin in ED, 500 cc bolus, MRSA+, RVP neg  - cefepime for Pseudomonas coverage (3/29-4/3)  - vancomycin given recent intubation (3/29 -4/6 ), diflucan (3/30-4/5)  - BCx 3/30 with MRSE in 1/2 bottles, likely contaminant  - UCx <50K candida glabrata  - Sputum Cx +mrsa  - fungitell neg  - aspergillus abs, galactomannan neg  - EKG, monitor QTC interval  - Bcx repeat until cleared - BCx 4/1 NG  - vanc trough, prior to every 4th dose  - ID consult, appreciate recs

## 2022-04-06 NOTE — PROGRESS NOTE ADULT - SUBJECTIVE AND OBJECTIVE BOX
Patient is a 71y old  Female who presents with a chief complaint of sepsis (06 Apr 2022 07:33)    Being followed by ID for        Interval history:  No other acute events      ROS:  No cough,SOB,CP  No N/V/D  No abd pain  No urinary complaints  No HA  No joint or limb pain  No other complaints    PAST MEDICAL & SURGICAL HISTORY:  Atrial fibrillation  paroxysmal, on eliquis    Diabetes  Type 2    COPD (chronic obstructive pulmonary disease)    Adrenal insufficiency  Medrol daily for over 50 years    Aortic insufficiency  moderate AR on echo 5/3/2018    Pelvic fracture    Asthma    Tracheobronchomalacia  diagnosed 2015, s/p bronchial thermoplasty 2016 (Dr Zapien); recent bronchoscopy 6/5/2018 revealed no evidence of tracheobronchomalacia in trachea or bronchial tubes    Colorectal cancer  4/2018- last treatment , chemo and radiation    Rectal bleeding    Seizure  x 1 1/7/18    DVT (deep venous thrombosis)  15-20 years ago, took coumadin    TIA (transient ischemic attack)  multiple, last 5 years ago - presents as right-sided weakness    History of partial hysterectomy  30 years ago - fibroids    H/O total knee replacement, bilateral  5 years ago    History of sinus surgery  multiple sinus surgeries    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    H/O pelvic surgery  5 years ago - s/p fracture    History of tracheomalacia  2015 - attempted tracheal stenting ([facility])- course complicated by obstruction, respiratory failure, multiple CPR attempts -  stent discontinued; 10/20/2016 Tracheobronchoplasty (Prolene Mesh) performed at [facility] by Dr Zapien    S/P bronchoscopy  6/5/2018 - [facility] (Dr Zapien) no evidence of tracheobronchomalacia in trachea or bronchial tubes    Rectal bleeding  exam under anesthesia ([facility]) 2/2018      Allergies    aspirin (Short breath)  Avelox (Short breath; Pruritus)  codeine (Short breath)  Dilaudid (Short breath)  iodine (Short breath; Swelling)  shellfish (Anaphylaxis)  tetanus toxoid (Short breath)  Valium (Short breath)    Intolerances      Antimicrobials:    vancomycin  IVPB 1000 milliGRAM(s) IV Intermittent every 12 hours    MEDICATIONS  (STANDING):  albuterol/ipratropium for Nebulization 3 milliLiter(s) Nebulizer every 6 hours  apixaban 5 milliGRAM(s) Oral every 12 hours  ascorbic acid 500 milliGRAM(s) Oral daily  atorvastatin 20 milliGRAM(s) Oral at bedtime  chlorhexidine 2% Cloths 1 Application(s) Topical <User Schedule>  dextrose 5%. 1000 milliLiter(s) (50 mL/Hr) IV Continuous <Continuous>  dextrose 5%. 1000 milliLiter(s) (100 mL/Hr) IV Continuous <Continuous>  dextrose 5%. 1000 milliLiter(s) (50 mL/Hr) IV Continuous <Continuous>  dextrose 5%. 1000 milliLiter(s) (100 mL/Hr) IV Continuous <Continuous>  dextrose 50% Injectable 25 Gram(s) IV Push once  dextrose 50% Injectable 25 Gram(s) IV Push once  dextrose 50% Injectable 12.5 Gram(s) IV Push once  dextrose 50% Injectable 25 Gram(s) IV Push once  dextrose 50% Injectable 25 Gram(s) IV Push once  dextrose 50% Injectable 12.5 Gram(s) IV Push once  glucagon  Injectable 1 milliGRAM(s) IntraMuscular once  glucagon  Injectable 1 milliGRAM(s) IntraMuscular once  influenza  Vaccine (HIGH DOSE) 0.7 milliLiter(s) IntraMuscular once  insulin glargine Injectable (LANTUS) 14 Unit(s) SubCutaneous at bedtime  insulin lispro (ADMELOG) corrective regimen sliding scale   SubCutaneous three times a day before meals  insulin lispro (ADMELOG) corrective regimen sliding scale   SubCutaneous at bedtime  lactulose Syrup 15 Gram(s) Oral daily  methylPREDNISolone 8 milliGRAM(s) Oral daily  mexiletine 200 milliGRAM(s) Oral three times a day  multivitamin 1 Tablet(s) Oral daily  pantoprazole    Tablet 40 milliGRAM(s) Oral before breakfast  polyethylene glycol 3350 17 Gram(s) Oral two times a day  pregabalin 150 milliGRAM(s) Oral two times a day  senna 2 Tablet(s) Oral at bedtime  sodium chloride 7% Inhalation 4 milliLiter(s) Inhalation every 12 hours  vancomycin  IVPB 1000 milliGRAM(s) IV Intermittent every 12 hours  zinc sulfate 220 milliGRAM(s) Oral daily      Vital Signs Last 24 Hrs  T(C): 36.3 (04-06-22 @ 04:40), Max: 37.4 (04-05-22 @ 21:20)  T(F): 97.3 (04-06-22 @ 04:40), Max: 99.3 (04-05-22 @ 21:20)  HR: 98 (04-06-22 @ 04:40) (98 - 105)  BP: 119/69 (04-06-22 @ 04:40) (119/69 - 123/74)  BP(mean): --  RR: 18 (04-06-22 @ 07:30) (18 - 18)  SpO2: 95% (04-06-22 @ 04:40) (93% - 96%)    Physical Exam:    Constitutional well preserved,comfortable,pleasant    HEENT PERRLA EOMI,No pallor or icterus    No oral exudate or erythema    Neck supple no JVD or LN    Chest Good AE,CTA    CVS RRR S1 S2 WNl No murmur or rub or gallop    Abd soft BS normal No tenderness no masses    Ext No cyanosis clubbing or edema    IV site no erythema tenderness or discharge    Joints no swelling or LOM    CNS AAO X 3 no focal    Lab Data:                          10.7   11.01 )-----------( 394      ( 06 Apr 2022 06:49 )             36.1       04-06    138  |  99  |  13  ----------------------------<  198<H>  4.7   |  29  |  0.51    Ca    9.1      06 Apr 2022 06:49  Phos  2.2     04-06  Mg     1.8     04-06    TPro  6.2  /  Alb  2.7<L>  /  TBili  0.3  /  DBili  x   /  AST  26  /  ALT  51<H>  /  AlkPhos  116  04-06          .Blood Blood  04-01-22   No growth to date.  --  --      .Blood Blood  03-30-22   Growth in aerobic bottle: Staphylococcus epidermidis  Coag Negative Staphylococcus  Single set isolate, possible contaminant. Contact  Microbiology if susceptibility testing clinically  indicated.  ***Blood Panel PCR results on this specimen are available  approximately 3 hours after the Gram stain result.***  Gram stain, PCR, and/or culture results may not always  correspond due to difference in methodologies.  ************************************************************  This PCR assay was performed by multiplex PCR. This  Assay tests for 66 bacterial and resistance gene targets.  Please refer to the [facility] Labs test directory  at [email or web address] for details.  --  Blood Culture PCR                Vancomycin Level, Trough: 20.1 ug/mL (04-06-22 @ 00:17)      WBC Count: 11.01 (04-06-22 @ 06:49)  WBC Count: 12.13 (04-05-22 @ 09:51)  WBC Count: 7.29 (04-04-22 @ 06:39)  WBC Count: 7.33 (04-03-22 @ 11:00)  WBC Count: 4.87 (04-02-22 @ 05:34)  WBC Count: 5.35 (04-01-22 @ 07:34)  WBC Count: 6.91 (03-31-22 @ 07:38)             Patient is a 71y old  Female who presents with a chief complaint of sepsis (06 Apr 2022 07:33)    Being followed by ID for MRSA        Interval history:  pt is feeling weak  breathing stable   No other acute events        PAST MEDICAL & SURGICAL HISTORY:  Atrial fibrillation  paroxysmal, on eliquis    Diabetes  Type 2    COPD (chronic obstructive pulmonary disease)    Adrenal insufficiency  Medrol daily for over 50 years    Aortic insufficiency  moderate AR on echo 5/3/2018    Pelvic fracture    Asthma    Tracheobronchomalacia  diagnosed 2015, s/p bronchial thermoplasty 2016 (Dr Zapien); recent bronchoscopy 6/5/2018 revealed no evidence of tracheobronchomalacia in trachea or bronchial tubes    Colorectal cancer  4/2018- last treatment , chemo and radiation    Rectal bleeding    Seizure  x 1 1/7/18    DVT (deep venous thrombosis)  15-20 years ago, took coumadin    TIA (transient ischemic attack)  multiple, last 5 years ago - presents as right-sided weakness    History of partial hysterectomy  30 years ago - fibroids    H/O total knee replacement, bilateral  5 years ago    History of sinus surgery  multiple sinus surgeries    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    H/O pelvic surgery  5 years ago - s/p fracture    History of tracheomalacia  2015 - attempted tracheal stenting ([facility])- course complicated by obstruction, respiratory failure, multiple CPR attempts -  stent discontinued; 10/20/2016 Tracheobronchoplasty (Prolene Mesh) performed at [facility] by Dr Zapien    S/P bronchoscopy  6/5/2018 - [facility] (Dr Zapien) no evidence of tracheobronchomalacia in trachea or bronchial tubes    Rectal bleeding  exam under anesthesia ([facility]) 2/2018      Allergies    aspirin (Short breath)  Avelox (Short breath; Pruritus)  codeine (Short breath)  Dilaudid (Short breath)  iodine (Short breath; Swelling)  shellfish (Anaphylaxis)  tetanus toxoid (Short breath)  Valium (Short breath)    Intolerances      Antimicrobials:    vancomycin  IVPB 1000 milliGRAM(s) IV Intermittent every 12 hours    MEDICATIONS  (STANDING):  albuterol/ipratropium for Nebulization 3 milliLiter(s) Nebulizer every 6 hours  apixaban 5 milliGRAM(s) Oral every 12 hours  ascorbic acid 500 milliGRAM(s) Oral daily  atorvastatin 20 milliGRAM(s) Oral at bedtime  chlorhexidine 2% Cloths 1 Application(s) Topical <User Schedule>  dextrose 5%. 1000 milliLiter(s) (50 mL/Hr) IV Continuous <Continuous>  dextrose 5%. 1000 milliLiter(s) (100 mL/Hr) IV Continuous <Continuous>  dextrose 5%. 1000 milliLiter(s) (50 mL/Hr) IV Continuous <Continuous>  dextrose 5%. 1000 milliLiter(s) (100 mL/Hr) IV Continuous <Continuous>  dextrose 50% Injectable 25 Gram(s) IV Push once  dextrose 50% Injectable 25 Gram(s) IV Push once  dextrose 50% Injectable 12.5 Gram(s) IV Push once  dextrose 50% Injectable 25 Gram(s) IV Push once  dextrose 50% Injectable 25 Gram(s) IV Push once  dextrose 50% Injectable 12.5 Gram(s) IV Push once  glucagon  Injectable 1 milliGRAM(s) IntraMuscular once  glucagon  Injectable 1 milliGRAM(s) IntraMuscular once  influenza  Vaccine (HIGH DOSE) 0.7 milliLiter(s) IntraMuscular once  insulin glargine Injectable (LANTUS) 14 Unit(s) SubCutaneous at bedtime  insulin lispro (ADMELOG) corrective regimen sliding scale   SubCutaneous three times a day before meals  insulin lispro (ADMELOG) corrective regimen sliding scale   SubCutaneous at bedtime  lactulose Syrup 15 Gram(s) Oral daily  methylPREDNISolone 8 milliGRAM(s) Oral daily  mexiletine 200 milliGRAM(s) Oral three times a day  multivitamin 1 Tablet(s) Oral daily  pantoprazole    Tablet 40 milliGRAM(s) Oral before breakfast  polyethylene glycol 3350 17 Gram(s) Oral two times a day  pregabalin 150 milliGRAM(s) Oral two times a day  senna 2 Tablet(s) Oral at bedtime  sodium chloride 7% Inhalation 4 milliLiter(s) Inhalation every 12 hours  vancomycin  IVPB 1000 milliGRAM(s) IV Intermittent every 12 hours  zinc sulfate 220 milliGRAM(s) Oral daily      Vital Signs Last 24 Hrs  T(C): 36.3 (04-06-22 @ 04:40), Max: 37.4 (04-05-22 @ 21:20)  T(F): 97.3 (04-06-22 @ 04:40), Max: 99.3 (04-05-22 @ 21:20)  HR: 98 (04-06-22 @ 04:40) (98 - 105)  BP: 119/69 (04-06-22 @ 04:40) (119/69 - 123/74)  BP(mean): --  RR: 18 (04-06-22 @ 07:30) (18 - 18)  SpO2: 95% (04-06-22 @ 04:40) (93% - 96%)    Physical Exam:    Constitutional pt weak    HEENT PERRLA EOMI,No pallor or icterus  no thrush    Neck supple no JVD or LN    Chest Good AE,CTA    CVS  S1 S2     Abd soft BS normal No tenderness colostomy    Ext No cyanosis clubbing or edema    IV site no erythema tenderness or discharge    Joints no swelling or LOM    CNS AAO X 3 no focal    Lab Data:                          10.7   11.01 )-----------( 394      ( 06 Apr 2022 06:49 )             36.1       04-06    138  |  99  |  13  ----------------------------<  198<H>  4.7   |  29  |  0.51    Ca    9.1      06 Apr 2022 06:49  Phos  2.2     04-06  Mg     1.8     04-06    TPro  6.2  /  Alb  2.7<L>  /  TBili  0.3  /  DBili  x   /  AST  26  /  ALT  51<H>  /  AlkPhos  116  04-06          .Blood Blood  04-01-22   No growth to date.  --  --      .Blood Blood  03-30-22   Growth in aerobic bottle: Staphylococcus epidermidis  Coag Negative Staphylococcus  Single set isolate, possible contaminant. Contact  Microbiology if susceptibility testing clinically  indicated.  ***Blood Panel PCR results on this specimen are available  approximately 3 hours after the Gram stain result.***  Gram stain, PCR, and/or culture results may not always  correspond due to difference in methodologies.  ************************************************************  This PCR assay was performed by multiplex PCR. This  Assay tests for 66 bacterial and resistance gene targets.  Please refer to the [facility] Labs test directory  at [email or web address] for details.  --  Blood Culture PCR          Vancomycin Level, Trough: 20.1 ug/mL (04-06-22 @ 00:17)      WBC Count: 11.01 (04-06-22 @ 06:49)  WBC Count: 12.13 (04-05-22 @ 09:51)  WBC Count: 7.29 (04-04-22 @ 06:39)  WBC Count: 7.33 (04-03-22 @ 11:00)  WBC Count: 4.87 (04-02-22 @ 05:34)  WBC Count: 5.35 (04-01-22 @ 07:34)  WBC Count: 6.91 (03-31-22 @ 07:38)

## 2022-04-06 NOTE — PROGRESS NOTE ADULT - TIME BILLING
as above: Improved significantly since admit-no new issues--s/p ENT evaln-thrush--diflucan in place for 10 days (D8/10 as per ENT)  multifactorial dyspnea-TBM, CB, severe persistent asthma, ? PNA, thrush , debility, anemia, CAD--O2 NC sat above 90%  ?PNA/bronchiectasis-f/up sputum cx-prior pseudomonas- (s/p ID formal evaln-RISHABH Liz/Girish et al)-on cefepime (completed)  /vanco D10/10-          MRSA and pseudomonas as well as achromobacter and kleb   agreed with vancomycin to continue through 4/6, off cefepime 4/3    TBM-CB/brochiectasis-acapella/vest rx, incentive spirometry--no need for fob for additional sputum--utilize vest rx  asthma-medrol 8 mg (chronic dosing) , spiriva/symbicort, duoneb q 6, singulair 10 q hs, hypertonic saline  allergy-claritin/flonase                                 *****dysphonia/VC dysfunction-thrush-ENT evaln/swallow evaln -puree diet/asp precautions  gerd-ppi  abnormal CT-nodule-f/up 3 months                    cards-on mult rx-to continue  PT-OOB    DVT prophylaxis              Decub care                  ID-vanco for MRSA continues through 4/6  DC planning-will need extensive rehab      (all inhaled meds and eating must be done in chair-efficacy and asp. risk)    Eulalio Allison MD-Pulmonary   [phone].

## 2022-04-06 NOTE — PROGRESS NOTE ADULT - PROBLEM SELECTOR PLAN 3
- patient is s/p colostomy after colon cancer s/p total colectomy.  - reportedly with constipation and decreased colostomy output after holding home lactulose, now improved  - Output noted from ostomy for first time in 2 days - small solid clumps, now pasty  - c/w home lactulose  - c/w miralax and senna  - serial abdominal exam  - can consider GI series if without resolution - patient with high FS  - FS before meals and at bedtime  - Lantus 20 U bedtime  - admelog 9  - low dose ISS

## 2022-04-07 ENCOUNTER — TRANSCRIPTION ENCOUNTER (OUTPATIENT)
Age: 72
End: 2022-04-07

## 2022-04-07 VITALS
HEART RATE: 88 BPM | RESPIRATION RATE: 16 BRPM | SYSTOLIC BLOOD PRESSURE: 114 MMHG | OXYGEN SATURATION: 96 % | DIASTOLIC BLOOD PRESSURE: 71 MMHG | TEMPERATURE: 98 F

## 2022-04-07 LAB
GLUCOSE BLDC GLUCOMTR-MCNC: 157 MG/DL — HIGH (ref 70–99)
GLUCOSE BLDC GLUCOMTR-MCNC: 201 MG/DL — HIGH (ref 70–99)
PROT SERPL-MCNC: 5.5 G/DL — LOW (ref 6–8.3)
PROT SERPL-MCNC: 5.5 G/DL — LOW (ref 6–8.3)

## 2022-04-07 PROCEDURE — 93010 ELECTROCARDIOGRAM REPORT: CPT

## 2022-04-07 PROCEDURE — 99232 SBSQ HOSP IP/OBS MODERATE 35: CPT

## 2022-04-07 PROCEDURE — 99239 HOSP IP/OBS DSCHRG MGMT >30: CPT

## 2022-04-07 RX ORDER — INSULIN LISPRO 100/ML
1 VIAL (ML) SUBCUTANEOUS
Qty: 30 | Refills: 0
Start: 2022-04-07 | End: 2022-05-06

## 2022-04-07 RX ORDER — INSULIN ASPART 100 [IU]/ML
5 INJECTION, SOLUTION SUBCUTANEOUS
Qty: 0 | Refills: 0 | DISCHARGE

## 2022-04-07 RX ORDER — ASCORBIC ACID 60 MG
1 TABLET,CHEWABLE ORAL
Qty: 0 | Refills: 0 | DISCHARGE
Start: 2022-04-07

## 2022-04-07 RX ORDER — INSULIN LISPRO 100/ML
4 VIAL (ML) SUBCUTANEOUS
Qty: 0 | Refills: 0 | DISCHARGE
Start: 2022-04-07

## 2022-04-07 RX ORDER — CHLORHEXIDINE GLUCONATE 213 G/1000ML
1 SOLUTION TOPICAL
Qty: 0 | Refills: 0 | DISCHARGE
Start: 2022-04-07

## 2022-04-07 RX ORDER — INSULIN LISPRO 100/ML
9 VIAL (ML) SUBCUTANEOUS
Qty: 0 | Refills: 0 | DISCHARGE
Start: 2022-04-07

## 2022-04-07 RX ORDER — INSULIN GLARGINE 100 [IU]/ML
15 INJECTION, SOLUTION SUBCUTANEOUS
Qty: 0 | Refills: 0 | DISCHARGE

## 2022-04-07 RX ORDER — ONDANSETRON 8 MG/1
2 TABLET, FILM COATED ORAL
Qty: 0 | Refills: 0 | DISCHARGE
Start: 2022-04-07

## 2022-04-07 RX ORDER — ZINC SULFATE TAB 220 MG (50 MG ZINC EQUIVALENT) 220 (50 ZN) MG
1 TAB ORAL
Qty: 0 | Refills: 0 | DISCHARGE
Start: 2022-04-07

## 2022-04-07 RX ORDER — ACETAMINOPHEN 500 MG
3 TABLET ORAL
Qty: 0 | Refills: 0 | DISCHARGE
Start: 2022-04-07

## 2022-04-07 RX ORDER — INSULIN LISPRO 100/ML
0 VIAL (ML) SUBCUTANEOUS
Qty: 0 | Refills: 0 | DISCHARGE
Start: 2022-04-07

## 2022-04-07 RX ORDER — IPRATROPIUM/ALBUTEROL SULFATE 18-103MCG
3 AEROSOL WITH ADAPTER (GRAM) INHALATION
Qty: 0 | Refills: 0 | DISCHARGE

## 2022-04-07 RX ORDER — MEXILETINE HYDROCHLORIDE 150 MG/1
1 CAPSULE ORAL
Qty: 0 | Refills: 0 | DISCHARGE
Start: 2022-04-07

## 2022-04-07 RX ORDER — ONDANSETRON 8 MG/1
0 TABLET, FILM COATED ORAL
Qty: 0 | Refills: 0 | DISCHARGE
Start: 2022-04-07

## 2022-04-07 RX ORDER — INSULIN GLARGINE 100 [IU]/ML
20 INJECTION, SOLUTION SUBCUTANEOUS
Qty: 0 | Refills: 0 | DISCHARGE
Start: 2022-04-07

## 2022-04-07 RX ORDER — APIXABAN 2.5 MG/1
1 TABLET, FILM COATED ORAL
Qty: 0 | Refills: 0 | DISCHARGE
Start: 2022-04-07

## 2022-04-07 RX ORDER — IPRATROPIUM/ALBUTEROL SULFATE 18-103MCG
3 AEROSOL WITH ADAPTER (GRAM) INHALATION
Qty: 0 | Refills: 0 | DISCHARGE
Start: 2022-04-07

## 2022-04-07 RX ORDER — SENNA PLUS 8.6 MG/1
2 TABLET ORAL
Qty: 0 | Refills: 0 | DISCHARGE
Start: 2022-04-07

## 2022-04-07 RX ORDER — SODIUM CHLORIDE 9 MG/ML
4 INJECTION INTRAMUSCULAR; INTRAVENOUS; SUBCUTANEOUS
Qty: 0 | Refills: 0 | DISCHARGE
Start: 2022-04-07

## 2022-04-07 RX ORDER — PANTOPRAZOLE SODIUM 20 MG/1
1 TABLET, DELAYED RELEASE ORAL
Qty: 0 | Refills: 0 | DISCHARGE
Start: 2022-04-07

## 2022-04-07 RX ORDER — POLYETHYLENE GLYCOL 3350 17 G/17G
17 POWDER, FOR SOLUTION ORAL
Qty: 0 | Refills: 0 | DISCHARGE
Start: 2022-04-07

## 2022-04-07 RX ORDER — LACTULOSE 10 G/15ML
22.5 SOLUTION ORAL
Qty: 0 | Refills: 0 | DISCHARGE
Start: 2022-04-07

## 2022-04-07 RX ORDER — INSULIN LISPRO 100/ML
1 VIAL (ML) SUBCUTANEOUS
Qty: 0 | Refills: 0 | DISCHARGE
Start: 2022-04-07

## 2022-04-07 RX ADMIN — MEXILETINE HYDROCHLORIDE 200 MILLIGRAM(S): 150 CAPSULE ORAL at 06:10

## 2022-04-07 RX ADMIN — Medication 1 TABLET(S): at 12:25

## 2022-04-07 RX ADMIN — Medication 1: at 09:10

## 2022-04-07 RX ADMIN — APIXABAN 5 MILLIGRAM(S): 2.5 TABLET, FILM COATED ORAL at 06:09

## 2022-04-07 RX ADMIN — ZINC SULFATE TAB 220 MG (50 MG ZINC EQUIVALENT) 220 MILLIGRAM(S): 220 (50 ZN) TAB at 12:24

## 2022-04-07 RX ADMIN — Medication 150 MILLIGRAM(S): at 06:11

## 2022-04-07 RX ADMIN — MEXILETINE HYDROCHLORIDE 200 MILLIGRAM(S): 150 CAPSULE ORAL at 12:28

## 2022-04-07 RX ADMIN — Medication 9 UNIT(S): at 12:25

## 2022-04-07 RX ADMIN — Medication 500 MILLIGRAM(S): at 12:25

## 2022-04-07 RX ADMIN — PANTOPRAZOLE SODIUM 40 MILLIGRAM(S): 20 TABLET, DELAYED RELEASE ORAL at 06:09

## 2022-04-07 RX ADMIN — Medication 3 MILLILITER(S): at 06:09

## 2022-04-07 RX ADMIN — SODIUM CHLORIDE 4 MILLILITER(S): 9 INJECTION INTRAMUSCULAR; INTRAVENOUS; SUBCUTANEOUS at 06:09

## 2022-04-07 RX ADMIN — CHLORHEXIDINE GLUCONATE 1 APPLICATION(S): 213 SOLUTION TOPICAL at 07:17

## 2022-04-07 RX ADMIN — Medication 3 MILLILITER(S): at 12:25

## 2022-04-07 RX ADMIN — Medication 9 UNIT(S): at 09:05

## 2022-04-07 RX ADMIN — Medication 2: at 12:24

## 2022-04-07 RX ADMIN — Medication 8 MILLIGRAM(S): at 06:09

## 2022-04-07 NOTE — PROGRESS NOTE ADULT - ASSESSMENT
70 y/o F w/tracheobronchomalacia s/p tracheobronchoplasty, severe persistent asthma, bronchiectasis, and colon cancer s/p colectomy admitted with likely exacerbation of bronchiectasis due to pneumonia.    - Supplemental O2 as needed goal O2 sat >= 90%  - Broad spectrum abx including pseudomonal coverage  - Airway clearance, acapella device, bronchodilators, chest PT  - Continue home asthma regimen  - Repeat CT scan in 3 months for follow up of new pulmonary nodule  *******************************************  3/29-no signif changes-ID f/up                                          SEE BELOW:  3/30-ENT evaln= fungus, ID f/up, diflucan in place; ? need for fob here  3/31-no new issues-weakness continues  4/1-tx to isolation room-ID evaln-MRSA and pseudomonas as well as achromobacter and kleb agree with vancomycin and cefepime for now  4/4-ID f/up-improved over all, continue on current abx as per ID  4/5-ID f/up-off cefepime--vanco to continue/ fluconazole  4/6-ENT and ID f/up, improving  4/76-SR-fsmmqggkl all ABX--no OOB

## 2022-04-07 NOTE — PROGRESS NOTE ADULT - PROBLEM SELECTOR PLAN 7
- Last A1c 7.8. Pt uses lantus 15 u qhs and Novolog varying dose depending on sugar, however was unable to elaborate on a usual dose of Novolog. Has been eating pureed diet due to weakness since last hospital stay  - Lantus 14 + Admelog 7 TID  - low dose ISS  - Monitor fingersticks for goal 140-180 - Last A1c 7.8. Pt uses lantus 15 u qhs and Novolog varying dose depending on sugar, however was unable to elaborate on a usual dose of Novolog. Has been eating pureed diet due to weakness since last hospital stay  - Lantus 20 + Admelog 9 TID  - low dose ISS  - Monitor fingersticks for goal 140-180

## 2022-04-07 NOTE — PROGRESS NOTE ADULT - PROBLEM SELECTOR PLAN 5
- Likely exacerbation in setting of sepsis. S/p tracheoplasty with residual frequent mucus production, now improved  - Cont IV antibiotics with Pseudomonal coverage for likely acute exacerbation  - Cont home steroids for severe persistent asthma and adrenal insufficiency  - Pt uses nebulizer treatment at home, duonebs q6h standing. ensure patient is sitting upright for all inhaled meds due to hx of tracheobronchomalacia  - Cont Spiriva  - Incentive spirometry  - Pulmonary (Dr. Allison) following

## 2022-04-07 NOTE — PROGRESS NOTE ADULT - PROBLEM SELECTOR PLAN 3
- patient with high FS  - FS before meals and at bedtime  - Lantus 20 U bedtime  - admelog 9  - low dose ISS

## 2022-04-07 NOTE — DISCHARGE NOTE NURSING/CASE MANAGEMENT/SOCIAL WORK - NSDCVIVACCINE_GEN_ALL_CORE_FT
COVID-19, mRNA, LNP-S, PF, 100 mcg/ 0.5 mL dose (Moderna); 06-Dec-2021 17:12; Afshan Lew (RADHA); [employer] [id number] (Exp. Date: 22-Dec-2021); IntraMuscular; Deltoid Left.; 0.25 milliLiter(s);

## 2022-04-07 NOTE — DISCHARGE NOTE NURSING/CASE MANAGEMENT/SOCIAL WORK - NSDCPEFALRISK_GEN_ALL_CORE
For information on Fall & Injury Prevention, visit: [email or web address] OR  [email or web address] OR  https://www.cdc.gov/steadi/patient.html

## 2022-04-07 NOTE — DISCHARGE NOTE NURSING/CASE MANAGEMENT/SOCIAL WORK - NSDCFUADDAPPT_GEN_ALL_CORE_FT
1. Upon discharge follow up as outpatient at [facility] [address] [facility] [phone]  2. please follow up with pulmonary Dr. Allison   3. please follow up with infectious disease Dr. Stout  4. Please follow up with PCP Dr. Samuels for hospital follow up.  5. Speech therapy, dysphagia rehab

## 2022-04-07 NOTE — PROGRESS NOTE ADULT - PROBLEM SELECTOR PLAN 10
- Started after extubation at [facility], improved likely 2/2 thrush  - ENT eval, appreciate assistance: laryngoscope at bedside with diffuse yellow patches of debris ?thrush. plan to rescope on 4/5: no thrush, normal structure and vocal cord mobility  - diflucan 200 mg IV first dose, then diflucan 100 mg IV x6 days then ENT will rescope after treatment (), increased to 200 mg IV per ID recs  - FEES scan: pureed diet and thin liquids, continued ST for dysphagia rehab

## 2022-04-07 NOTE — PROGRESS NOTE ADULT - PROBLEM SELECTOR PLAN 1
- Febrile, tachycardia, tachypnea, lactate 2.1. Associated AHRF, productive cough. Recent hospital stay with intubation for pneumonia at OSH and intubated, unclear which antibiotics were given and duration of treatment. Likely in setting of pneumonia and bronchiectasis exacerbation. History of sputum Cx +Pseudomonas (Feb 2020) via bronchoscopy. Also with UA grossly positive, however pt without symptoms  - S/p cefepime and vancomycin in ED, 500 cc bolus, MRSA+, RVP neg  - cefepime for Pseudomonas coverage (3/29-4/3)  - vancomycin given recent intubation (3/29 -4/6 ), diflucan (3/30-4/5)  - BCx 3/30 with MRSE in 1/2 bottles, likely contaminant  - UCx <50K candida glabrata  - Sputum Cx +mrsa  - fungitell neg  - aspergillus abs, galactomannan neg  - EKG, monitor QTC interval ()  - Bcx repeat until cleared - BCx 4/1 NG  - vanc trough, prior to every 4th dose  - ID consult, appreciate recs - Febrile, tachycardia, tachypnea, lactate 2.1. Associated AHRF, productive cough. Recent hospital stay with intubation for pneumonia at OSH and intubated, unclear which antibiotics were given and duration of treatment. Likely in setting of pneumonia and bronchiectasis exacerbation. History of sputum Cx +Pseudomonas (Feb 2020) via bronchoscopy. Also with UA grossly positive, however pt without symptoms  - S/p cefepime and vancomycin in ED, 500 cc bolus, MRSA+, RVP neg  - cefepime for Pseudomonas coverage (3/29-4/3)  - vancomycin given recent intubation (3/29 -4/6 ), diflucan (3/30-4/5)  - BCx 3/30 with MRSE in 1/2 bottles, likely contaminant  - UCx <50K candida glabrata  - Sputum Cx +mrsa  - fungitell neg  - aspergillus abs, galactomannan neg  - EKG, monitor QTC interval (). on follow up EKG <500  - Bcx repeat until cleared - BCx 4/1 NG  - ID consult, appreciate recs

## 2022-04-07 NOTE — PROGRESS NOTE ADULT - SUBJECTIVE AND OBJECTIVE BOX
CHIEF COMPLAINT: f/up sob, chronic resp failure, TBM, severe asthma, allergic rhinitis-voice improving toward baseline, less sob and cough-but very weak    Interval Events: NO OOB, ID f/up-DC of all ABX    REVIEW OF SYSTEMS:  Constitutional: No fevers or chills. No weight loss. + fatigue or generalized malaise.  Eyes: No itching or discharge from the eyes  ENT: No ear pain. No ear discharge. No nasal congestion. No post nasal drip. No epistaxis. No throat pain. No sore throat. No difficulty swallowing.   CV: No chest pain. No palpitations. No lightheadedness or dizziness.   Resp: No dyspnea at rest. No dyspnea on exertion. No orthopnea. No wheezing. + cough. No stridor. No sputum production. No chest pain with respiration.  GI: No nausea. No vomiting. No diarrhea.  MSK: No joint pain or pain in any extremities  Integumentary: No skin lesions. No pedal edema.  Neurological: + gross motor weakness. No sensory changes.  [ +] All other systems negative  [ ] Unable to assess ROS because ________    OBJECTIVE:  ICU Vital Signs Last 24 Hrs  T(C): 36.4 (07 Apr 2022 04:42), Max: 37 (06 Apr 2022 11:36)  T(F): 97.6 (07 Apr 2022 04:42), Max: 98.6 (06 Apr 2022 11:36)  HR: 88 (07 Apr 2022 04:42) (88 - 99)  BP: 112/70 (07 Apr 2022 04:42) (112/67 - 120/61)  BP(mean): --  ABP: --  ABP(mean): --  RR: 16 (07 Apr 2022 04:42) (16 - 19)  SpO2: 95% (07 Apr 2022 04:42) (90% - 95%)        04-05 @ 07:01  -  04-06 @ 07:00  --------------------------------------------------------  IN: 1374 mL / OUT: 975 mL / NET: 399 mL    04-06 @ 07:01  -  04-07 @ 05:20  --------------------------------------------------------  IN: 720 mL / OUT: 1250 mL / NET: -530 mL      CAPILLARY BLOOD GLUCOSE      POCT Blood Glucose.: 210 mg/dL (06 Apr 2022 21:01)      PHYSICAL EXAM: NAD in bed on NC  General: Awake, alert, oriented X 3.   HEENT: Atraumatic, normocephalic.                 Mallampatti Grade 1                No nasal congestion.                No tonsillar or pharyngeal exudates.  Lymph Nodes: No palpable lymphadenopathy  Neck: No JVD. No carotid bruit.   Respiratory: abnormal chest expansion                         Normal percussion                         Normal and equal air entry                         very mild exp wheeze, no rhonchi or rales.  Cardiovascular: S1 S2 normal. No murmurs, rubs or gallops.   Abdomen: Soft, non-tender, non-distended. No organomegaly. Normoactive bowel sounds.  Extremities: Warm to touch. Peripheral pulse palpable. No pedal edema.   Skin: No rashes or skin lesions  Neurological: Motor and sensory examination equal and abnormal in all four extremities.  Psychiatry: Appropriate mood and affect.    HOSPITAL MEDICATIONS:  MEDICATIONS  (STANDING):  albuterol/ipratropium for Nebulization 3 milliLiter(s) Nebulizer every 6 hours  apixaban 5 milliGRAM(s) Oral every 12 hours  ascorbic acid 500 milliGRAM(s) Oral daily  atorvastatin 20 milliGRAM(s) Oral at bedtime  chlorhexidine 2% Cloths 1 Application(s) Topical <User Schedule>  dextrose 5%. 1000 milliLiter(s) (100 mL/Hr) IV Continuous <Continuous>  dextrose 5%. 1000 milliLiter(s) (50 mL/Hr) IV Continuous <Continuous>  dextrose 5%. 1000 milliLiter(s) (100 mL/Hr) IV Continuous <Continuous>  dextrose 5%. 1000 milliLiter(s) (50 mL/Hr) IV Continuous <Continuous>  dextrose 5%. 1000 milliLiter(s) (100 mL/Hr) IV Continuous <Continuous>  dextrose 5%. 1000 milliLiter(s) (50 mL/Hr) IV Continuous <Continuous>  dextrose 50% Injectable 25 Gram(s) IV Push once  dextrose 50% Injectable 12.5 Gram(s) IV Push once  dextrose 50% Injectable 25 Gram(s) IV Push once  dextrose 50% Injectable 25 Gram(s) IV Push once  dextrose 50% Injectable 25 Gram(s) IV Push once  dextrose 50% Injectable 12.5 Gram(s) IV Push once  dextrose 50% Injectable 25 Gram(s) IV Push once  dextrose 50% Injectable 12.5 Gram(s) IV Push once  dextrose 50% Injectable 25 Gram(s) IV Push once  glucagon  Injectable 1 milliGRAM(s) IntraMuscular once  glucagon  Injectable 1 milliGRAM(s) IntraMuscular once  glucagon  Injectable 1 milliGRAM(s) IntraMuscular once  influenza  Vaccine (HIGH DOSE) 0.7 milliLiter(s) IntraMuscular once  insulin glargine Injectable (LANTUS) 20 Unit(s) SubCutaneous at bedtime  insulin lispro (ADMELOG) corrective regimen sliding scale   SubCutaneous three times a day before meals  insulin lispro (ADMELOG) corrective regimen sliding scale   SubCutaneous at bedtime  insulin lispro Injectable (ADMELOG) 9 Unit(s) SubCutaneous three times a day before meals  lactulose Syrup 15 Gram(s) Oral daily  methylPREDNISolone 8 milliGRAM(s) Oral daily  mexiletine 200 milliGRAM(s) Oral three times a day  multivitamin 1 Tablet(s) Oral daily  pantoprazole    Tablet 40 milliGRAM(s) Oral before breakfast  polyethylene glycol 3350 17 Gram(s) Oral two times a day  pregabalin 150 milliGRAM(s) Oral two times a day  senna 2 Tablet(s) Oral at bedtime  sodium chloride 7% Inhalation 4 milliLiter(s) Inhalation every 12 hours  zinc sulfate 220 milliGRAM(s) Oral daily    MEDICATIONS  (PRN):  acetaminophen     Tablet .. 975 milliGRAM(s) Oral every 6 hours PRN Temp greater or equal to 38C (100.4F), Mild Pain (1 - 3)  aluminum hydroxide/magnesium hydroxide/simethicone Suspension 30 milliLiter(s) Oral every 4 hours PRN Dyspepsia  dextrose Oral Gel 15 Gram(s) Oral once PRN Blood Glucose LESS THAN 70 milliGRAM(s)/deciliter  dextrose Oral Gel 15 Gram(s) Oral once PRN Blood Glucose LESS THAN 70 milliGRAM(s)/deciliter  dextrose Oral Gel 15 Gram(s) Oral once PRN Blood Glucose LESS THAN 70 milliGRAM(s)/deciliter  ondansetron Injectable 4 milliGRAM(s) IV Push every 8 hours PRN Nausea and/or Vomiting      LABS:                        10.7   11.01 )-----------( 394      ( 06 Apr 2022 06:49 )             36.1     04-06    138  |  99  |  13  ----------------------------<  198<H>  4.7   |  29  |  0.51    Ca    9.1      06 Apr 2022 06:49  Phos  2.2     04-06  Mg     1.8     04-06    TPro  6.2  /  Alb  2.7<L>  /  TBili  0.3  /  DBili  x   /  AST  26  /  ALT  51<H>  /  AlkPhos  116  04-06              MICROBIOLOGY:     RADIOLOGY:  [ ] Reviewed and interpreted by me    Point of Care Ultrasound Findings:    PFT:    EKG:

## 2022-04-07 NOTE — PROGRESS NOTE ADULT - PROVIDER SPECIALTY LIST ADULT
Pulmonology
Pulmonology
Infectious Disease
Pulmonology
Internal Medicine
Pulmonology
Infectious Disease
Internal Medicine
Pulmonology
ENT
Internal Medicine
Pulmonology
Internal Medicine
Pulmonology
Internal Medicine

## 2022-04-07 NOTE — PROGRESS NOTE ADULT - TIME BILLING
as above: Improving but weak-no new issues--s/p ID-completed all ABX as of 4/6  multifactorial dyspnea-TBM, CB, severe persistent asthma, ? PNA, thrush , debility, anemia, CAD--O2 NC sat above 90%  ?PNA/bronchiectasis-f/up sputum cx-prior pseudomonas- (s/p ID formal evaln-RISHABH Liz/Girish et al)-on cefepime (completed) and vanco D10/10-          MRSA and pseudomonas as well as achromobacter and kleb   agreed with vancomycin to continue through 4/6, off cefepime 4/3    TBM-CB/brochiectasis-acapella/vest rx, incentive spirometry--no need for fob for additional sputum--utilize vest rx  asthma-medrol 8 mg (chronic dosing) , spiriva/symbicort, duoneb q 6, singulair 10 q hs, hypertonic saline  allergy-claritin/flonase                                 *****dysphonia/VC dysfunction-thrush-ENT evaln/swallow evaln -puree diet/asp precautions  gerd-ppi  abnormal CT-nodule-f/up 3 months                    cards-on mult rx-to continue  PT-OOB    DVT prophylaxis              Decub care                  ID-vanco for MRSA continues through 4/6 (completed)  DC planning-will need extensive rehab      (all inhaled meds and eating must be done in chair-efficacy and asp. risk)    Eulalio Allison MD-Pulmonary   [phone].

## 2022-04-07 NOTE — PROGRESS NOTE ADULT - PROBLEM SELECTOR PLAN 4
- patient is s/p colostomy after colon cancer s/p total colectomy.  - reportedly with constipation and decreased colostomy output after holding home lactulose, now improved  - Output noted from ostomy for first time in 2 days - small solid clumps, now pasty  - c/w home lactulose  - c/w miralax and senna  - serial abdominal exam  - can consider GI series if without resolution

## 2022-04-07 NOTE — PROGRESS NOTE ADULT - ATTENDING COMMENTS
70 y/o F with h/o significant for colon cancer s/p total colectomy, tracheobronchomalacia s/p tracheobronchoplasty, bronchiectasis with recent admission for PNA, presenting from rehab with acute on chronic hypoxic respiratory failure found to have PNA + mucous plug.   #Hypophonia  #Mucous Plug  #Severe Sepsis  #Aspiration PNA  #Bronchiectasis  #Adrenal Insufficiency  -continue vanc/cefepime  -f/u culture data  -continue chest PT + airway clearance  -Discuss with pulm regarding bronchoscopy  -ENT to perform laryngoscopy given hypophonia  -Continue medrol 4mg daily, Monitor FS closely given DM  Remainder as above
72 y/o F with h/o significant for colon cancer s/p total colectomy, tracheobronchomalacia s/p tracheobronchoplasty, bronchiectasis with recent admission for PNA, presenting from rehab with acute on chronic hypoxic respiratory failure found to have PNA + mucous plug, in addition to hypophonia and thrush.   #Hypophonia  #Mucous Plug  #Severe Sepsis  #Aspiration PNA  #Bronchiectasis  #Adrenal Insufficiency  #thrush  -Febrile yesterday afternoon, resolved with tylenol. MRSA +, will continue  vanc/cefepime (day 4, 3/28-). ID consulted  -continue chest PT + airway clearance  -wean O2 as tolerated  -Pulm following appreciate recs  -Episode hypoglycemia yesterday, standing premeal insulin d/judi  -Medrol increased to 8mg  -continue diflucan  Remainder as above.
70 y/o F with h/o significant for colon cancer s/p total colectomy, tracheobronchomalacia s/p tracheobronchoplasty, bronchiectasis with recent admission for PNA, presenting from rehab with acute on chronic hypoxic respiratory failure found to have MRSA PNA + mucous plug, thrush, hypophonia, all improved. Pending D/C to [facility]  #Hypophonia  #Mucous Plug  #Severe Sepsis  #Aspiration PNA  #Bronchiectasis  #Adrenal Insufficiency  #DM  #thrush  -patient now on RA  -repeat scope with ENT with resolved thrush. s/p fluconazole  -Vanc day 10, will d/c after today  -continue chest PT/aerobika  -FS remain elevated 200s-300s with improvement in appetite, uptitrating insulin  -Pending d/c to [facility]  Remainder as above.
1 y/o F with h/o significant for colon cancer s/p total colectomy, tracheobronchomalacia s/p tracheobronchoplasty, bronchiectasis with recent admission for PNA, presenting from rehab with acute on chronic hypoxic respiratory failure found to have MRSA PNA + mucous plug, thrush, hypophonia, all improved. Pending D/C to [facility]  #Hypophonia  #Mucous Plug  #Severe Sepsis  #Aspiration PNA  #Bronchiectasis  #Adrenal Insufficiency  #DM  #thrush  -d/c to [facility] today, discharge time spent by provider 40 minutes
70 y/o F with h/o significant for colon cancer s/p total colectomy, tracheobronchomalacia s/p tracheobronchoplasty, bronchiectasis with recent admission for PNA, presenting from rehab with acute on chronic hypoxic respiratory failure found to have PNA + mucous plug, thrush, hypophonia, all improving.   #Hypophonia  #Mucous Plug  #Severe Sepsis  #Aspiration PNA  #Bronchiectasis  #Adrenal Insufficiency  #thrush  -patient now on RA  -repeat scope with ENT with resolved thrush. fluconazole d/judi  -Vanc day 9, afeb hds improved respiratory status, planning 10 day course  -continue chest PT  d/c planning: [facility]  Remainder as above.
72 y/o F with h/o significant for colon cancer s/p total colectomy, tracheobronchomalacia s/p tracheobronchoplasty, bronchiectasis with recent admission for PNA, presenting from rehab with acute on chronic hypoxic respiratory failure found to have PNA + mucous plug, thrush, hypophonia  #Hypophonia  #Mucous Plug  #Severe Sepsis  #Aspiration PNA  #Bronchiectasis  #Adrenal Insufficiency  #thrush  -Continues to be F overnight. Sputum + for MRSA c/f MRSA PNA. Has h/o MRSA and pseudomonas, continue Vanc/cefepime. ID following.  -Thrush + hypophonia+ urine with Candida glabrata, fluconazole increased to 200mg daily per ID  -continue chest PT + airway clearance  -wean O2 as tolerated, now on 2L  -Pulm following   -1/2 BCX + with staph epi, will repeat Bcxs  -continue medrol 8mg  -FS 200s, no further hypoglycemia episodes  Remainder as above.
70 y/o F with h/o significant for colon cancer s/p total colectomy, tracheobronchomalacia s/p tracheobronchoplasty, bronchiectasis with recent admission for PNA, presenting from rehab with acute on chronic hypoxic respiratory failure found to have PNA + mucous plug, in addition to hypophonia and thrush.   #Hypophonia  #Mucous Plug  #Severe Sepsis  #Aspiration PNA  #Bronchiectasis  #Adrenal Insufficiency  #thrush  -MRSA +, continue  vanc/cefepime (day 3, 3/28-)  -f/u culture data  -continue chest PT + airway clearance, avoiding bronchoscopy given high risk complications  -wean O2 as tolerated  -Pulm following appreciate recs  -Continue medrol 4mg daily, increasing insulin given hyperglycemia  -f/u S&S  -s/p Laryngoscopy, + thrush, vocal cords without dysfunction, starting diflucan  Remainder as above.
Patient seen and examined at bedside. No acute events overnight. No respiratory complaints. Will d/c cefepime as she has finished treatment for both pneumonia and bronchiectasis exacerbation. Sputum culture with MRSA so she needs a few more days of vancomycin. Monitor trough, 10-15 seems appropriate. Currently on Fluconazole with pending repeat fiber optic exam by ENT. Continue with aggressive chest PT. Some output form ostomy today, will monitor Eventually for [facility], hopefully at the end of the week.
Patient seen and examined at bedside. No acute events overnight. Possibly no output from ostomy? Will monitor daily, be more aggressive with bowel regimen and consider GI series if not resolved. Currently on Cefepime & Vancomycin, can likely d/c cefepime soon as patient with MRSA pneumonia. She has MRSE growing in one culture, but repeat NGTD--likely contaminant. Aggressive pulmonary therapy measures & mucolytic agents. Patient for [facility]. Currently treating for thrush/esophagitis? ENT plan to rescope after 7 days of Diflucan
70 y/o F with h/o significant for colon cancer s/p total colectomy, tracheobronchomalacia s/p tracheobronchoplasty, bronchiectasis with recent admission for PNA, presenting from rehab with acute on chronic hypoxic respiratory failure found to have PNA + mucous plug, thrush, hypophonia  #Hypophonia  #Mucous Plug  #Severe Sepsis  #Aspiration PNA  #Bronchiectasis  #Adrenal Insufficiency  #thrush  -weaned to baseline O2 requirements  -Afeb hds, on vanc + fluconazole, planning for 7 day course  -Hypophonia improving, repeat evaluation by ENT tomorrow  -Continue medrol 8mg, FS labile although improving  d/c planning: [facility]  Remainder as above.

## 2022-04-07 NOTE — DISCHARGE NOTE NURSING/CASE MANAGEMENT/SOCIAL WORK - PATIENT PORTAL LINK FT
You can access the FollowMyHealth Patient Portal offered by [facility] by registering at the following website: [email or web address]. By joining "[employer]’s FollowMyHealth portal, you will also be able to view your health information using other applications (apps) compatible with our system.

## 2022-04-07 NOTE — PROGRESS NOTE ADULT - PROBLEM SELECTOR PLAN 11
DVT: Eliquis  Diet: pureed, thin liquids, consistent carb diet, glucerna shake 3/day, zinc, MVI, and vitamin C.  speech/swallow eval passed, nutrition consult, appreciate recs  PT: [facility]  LDA: colostomy, PIV  Dispo: [facility]. [facility] patient will establish follow up appt with pulm  Code: Full code

## 2022-04-07 NOTE — PROGRESS NOTE ADULT - NUTRITIONAL ASSESSMENT
This patient has been assessed with a concern for Malnutrition and has been determined to have a diagnosis/diagnoses of Severe protein-calorie malnutrition.    This patient is being managed with:   Diet Consistent Carbohydrate w/Evening Snack-  Pureed (PUREED)  Supplement Feeding Modality:  Oral  Glucerna Shake Cans or Servings Per Day:  3       Frequency:  Daily  Entered: Mar 30 2022  2:53PM    
How to Use a Knee Immobilizer  A knee immobilizer, also called a knee brace, is used to support and protect an injured or painful knee. You may also have to wear it after knee surgery. A knee brace keeps your knee from moving or bending while it is healing. Wear and remove your knee brace only as told by your doctor.    ImageIn general, your knee brace should:    Have straps, hooks, or tapes that fasten snugly around your leg.  Not feel too tight or too loose.    Follow these instructions at home:  While resting, raise (elevate) your leg above the level of your heart. Pillows can be used for support. Doing this reduces throbbing and helps with healing.  Loosen the brace if your toes tingle or if you notice other symptoms or signs that it is too tight, such as:    Swelling.  Numbness.  Color change in your foot or ankle.  More pain.    Keep the brace clean.  If the brace is not waterproof:    Do not let it get wet.  Cover it with a watertight covering when you take a bath or a shower.    If your doctor says that you may take off (remove) the brace:    Take it off before you take a bath or a shower.  Check for any skin irritation.  Use a towel to dry the area completely before you put the brace back on.    Contact a doctor if:  Your knee brace breaks or needs to be replaced.  You have more pain or swelling in your knee, foot, or ankle.  Your knee brace is not helping.  Your knee brace makes your knee pain worse.  Summary  A knee immobilizer, also called a knee brace, keeps your knee from moving or bending while it is healing.  Different knee braces will have different instructions for use. Follow the instructions from your doctor.  Call your doctor if you have more pain or swelling, if your knee brace breaks, or if it does not help your knee pain.  This information is not intended to replace advice given to you by your health care provider. Make sure you discuss any questions you have with your health care provider.
This patient has been assessed with a concern for Malnutrition and has been determined to have a diagnosis/diagnoses of Severe protein-calorie malnutrition.    This patient is being managed with:   Diet Consistent Carbohydrate w/Evening Snack-  Pureed (PUREED)  Supplement Feeding Modality:  Oral  Glucerna Shake Cans or Servings Per Day:  3       Frequency:  Daily  Entered: Mar 30 2022  2:53PM    

## 2022-04-07 NOTE — PROGRESS NOTE ADULT - SUBJECTIVE AND OBJECTIVE BOX
PROGRESS NOTE:   Authored by Raissa Nguyen MD  Internal Medicine      Patient is a 71y old  Female who presents with a chief complaint of sepsis (07 Apr 2022 05:20)      SUBJECTIVE / OVERNIGHT EVENTS: NAEO, patient seen and examined at bedside    MEDICATIONS  (STANDING):  albuterol/ipratropium for Nebulization 3 milliLiter(s) Nebulizer every 6 hours  apixaban 5 milliGRAM(s) Oral every 12 hours  ascorbic acid 500 milliGRAM(s) Oral daily  atorvastatin 20 milliGRAM(s) Oral at bedtime  chlorhexidine 2% Cloths 1 Application(s) Topical <User Schedule>  dextrose 5%. 1000 milliLiter(s) (50 mL/Hr) IV Continuous <Continuous>  dextrose 5%. 1000 milliLiter(s) (100 mL/Hr) IV Continuous <Continuous>  dextrose 5%. 1000 milliLiter(s) (100 mL/Hr) IV Continuous <Continuous>  dextrose 5%. 1000 milliLiter(s) (50 mL/Hr) IV Continuous <Continuous>  dextrose 5%. 1000 milliLiter(s) (50 mL/Hr) IV Continuous <Continuous>  dextrose 5%. 1000 milliLiter(s) (100 mL/Hr) IV Continuous <Continuous>  dextrose 50% Injectable 25 Gram(s) IV Push once  dextrose 50% Injectable 12.5 Gram(s) IV Push once  dextrose 50% Injectable 25 Gram(s) IV Push once  dextrose 50% Injectable 25 Gram(s) IV Push once  dextrose 50% Injectable 12.5 Gram(s) IV Push once  dextrose 50% Injectable 25 Gram(s) IV Push once  dextrose 50% Injectable 25 Gram(s) IV Push once  dextrose 50% Injectable 12.5 Gram(s) IV Push once  dextrose 50% Injectable 25 Gram(s) IV Push once  glucagon  Injectable 1 milliGRAM(s) IntraMuscular once  glucagon  Injectable 1 milliGRAM(s) IntraMuscular once  glucagon  Injectable 1 milliGRAM(s) IntraMuscular once  influenza  Vaccine (HIGH DOSE) 0.7 milliLiter(s) IntraMuscular once  insulin glargine Injectable (LANTUS) 20 Unit(s) SubCutaneous at bedtime  insulin lispro (ADMELOG) corrective regimen sliding scale   SubCutaneous three times a day before meals  insulin lispro (ADMELOG) corrective regimen sliding scale   SubCutaneous at bedtime  insulin lispro Injectable (ADMELOG) 9 Unit(s) SubCutaneous three times a day before meals  lactulose Syrup 15 Gram(s) Oral daily  methylPREDNISolone 8 milliGRAM(s) Oral daily  mexiletine 200 milliGRAM(s) Oral three times a day  multivitamin 1 Tablet(s) Oral daily  pantoprazole    Tablet 40 milliGRAM(s) Oral before breakfast  polyethylene glycol 3350 17 Gram(s) Oral two times a day  pregabalin 150 milliGRAM(s) Oral two times a day  senna 2 Tablet(s) Oral at bedtime  sodium chloride 7% Inhalation 4 milliLiter(s) Inhalation every 12 hours  zinc sulfate 220 milliGRAM(s) Oral daily    MEDICATIONS  (PRN):  acetaminophen     Tablet .. 975 milliGRAM(s) Oral every 6 hours PRN Temp greater or equal to 38C (100.4F), Mild Pain (1 - 3)  aluminum hydroxide/magnesium hydroxide/simethicone Suspension 30 milliLiter(s) Oral every 4 hours PRN Dyspepsia  dextrose Oral Gel 15 Gram(s) Oral once PRN Blood Glucose LESS THAN 70 milliGRAM(s)/deciliter  dextrose Oral Gel 15 Gram(s) Oral once PRN Blood Glucose LESS THAN 70 milliGRAM(s)/deciliter  dextrose Oral Gel 15 Gram(s) Oral once PRN Blood Glucose LESS THAN 70 milliGRAM(s)/deciliter  ondansetron Injectable 4 milliGRAM(s) IV Push every 8 hours PRN Nausea and/or Vomiting      CAPILLARY BLOOD GLUCOSE      POCT Blood Glucose.: 210 mg/dL (06 Apr 2022 21:01)  POCT Blood Glucose.: 262 mg/dL (06 Apr 2022 16:50)  POCT Blood Glucose.: 330 mg/dL (06 Apr 2022 11:21)  POCT Blood Glucose.: 260 mg/dL (06 Apr 2022 08:11)    I&O's Summary    05 Apr 2022 07:01  -  06 Apr 2022 07:00  --------------------------------------------------------  IN: 1374 mL / OUT: 975 mL / NET: 399 mL    06 Apr 2022 07:01  -  07 Apr 2022 06:46  --------------------------------------------------------  IN: 720 mL / OUT: 1250 mL / NET: -530 mL        PHYSICAL EXAM:  Vital Signs Last 24 Hrs  T(C): 36.4 (07 Apr 2022 04:42), Max: 37 (06 Apr 2022 11:36)  T(F): 97.6 (07 Apr 2022 04:42), Max: 98.6 (06 Apr 2022 11:36)  HR: 88 (07 Apr 2022 04:42) (88 - 99)  BP: 112/70 (07 Apr 2022 04:42) (112/67 - 120/61)  BP(mean): --  RR: 16 (07 Apr 2022 04:42) (16 - 19)  SpO2: 95% (07 Apr 2022 04:42) (90% - 95%)  CONSTITUTIONAL: Well-groomed, in no apparent distress  EYES: No conjunctival or scleral injection, non-icteric;   ENMT: No external nasal lesions; MMM  NECK: Trachea midline without palpable neck mass; thyroid not enlarged and non-tender  RESPIRATORY: Breathing comfortably; no dullness to percussion; lungs CTA without wheeze/rhonchi/rales  CARDIOVASCULAR: +S1S2, RRR, no M/G/R; pedal pulses full and symmetric; no lower extremity edema  GASTROINTESTINAL: No palpable masses or tenderness, +BS throughout, no rebound/guarding; no hepatosplenomegaly; no hernia palpated  LYMPHATIC: No cervical LAD or tenderness  SKIN: No rashes or ulcers noted  NEUROLOGIC: CN II-XII intact; sensation intact in LEs b/l to light touch  PSYCHIATRIC: A+O x 3; mood and affect appropriate; appropriate insight and judgment    LABS:                        10.7   11.01 )-----------( 394      ( 06 Apr 2022 06:49 )             36.1     04-06    138  |  99  |  13  ----------------------------<  198<H>  4.7   |  29  |  0.51    Ca    9.1      06 Apr 2022 06:49  Phos  2.2     04-06  Mg     1.8     04-06    TPro  6.2  /  Alb  2.7<L>  /  TBili  0.3  /  DBili  x   /  AST  26  /  ALT  51<H>  /  AlkPhos  116  04-06                RADIOLOGY & ADDITIONAL TESTS:  Results Reviewed:   Imaging Personally Reviewed:  Electrocardiogram Personally Reviewed:    COORDINATION OF CARE:  Care Discussed with Consultants/Other Providers [Y/N]:  Prior or Outpatient Records Reviewed [Y/N]:   PROGRESS NOTE:   Authored by Raissa Nguyen MD  Internal Medicine      Patient is a 71y old  Female who presents with a chief complaint of sepsis (07 Apr 2022 05:20)      SUBJECTIVE / OVERNIGHT EVENTS: NAEO, patient seen and examined at bedside. reports cough and sputum production is stable, still coughing stuff up but improved compared to last week. no hemoptysis. belly pain resolved. eating well.     MEDICATIONS  (STANDING):  albuterol/ipratropium for Nebulization 3 milliLiter(s) Nebulizer every 6 hours  apixaban 5 milliGRAM(s) Oral every 12 hours  ascorbic acid 500 milliGRAM(s) Oral daily  atorvastatin 20 milliGRAM(s) Oral at bedtime  chlorhexidine 2% Cloths 1 Application(s) Topical <User Schedule>  dextrose 5%. 1000 milliLiter(s) (50 mL/Hr) IV Continuous <Continuous>  dextrose 5%. 1000 milliLiter(s) (100 mL/Hr) IV Continuous <Continuous>  dextrose 5%. 1000 milliLiter(s) (100 mL/Hr) IV Continuous <Continuous>  dextrose 5%. 1000 milliLiter(s) (50 mL/Hr) IV Continuous <Continuous>  dextrose 5%. 1000 milliLiter(s) (50 mL/Hr) IV Continuous <Continuous>  dextrose 5%. 1000 milliLiter(s) (100 mL/Hr) IV Continuous <Continuous>  dextrose 50% Injectable 25 Gram(s) IV Push once  dextrose 50% Injectable 12.5 Gram(s) IV Push once  dextrose 50% Injectable 25 Gram(s) IV Push once  dextrose 50% Injectable 25 Gram(s) IV Push once  dextrose 50% Injectable 12.5 Gram(s) IV Push once  dextrose 50% Injectable 25 Gram(s) IV Push once  dextrose 50% Injectable 25 Gram(s) IV Push once  dextrose 50% Injectable 12.5 Gram(s) IV Push once  dextrose 50% Injectable 25 Gram(s) IV Push once  glucagon  Injectable 1 milliGRAM(s) IntraMuscular once  glucagon  Injectable 1 milliGRAM(s) IntraMuscular once  glucagon  Injectable 1 milliGRAM(s) IntraMuscular once  influenza  Vaccine (HIGH DOSE) 0.7 milliLiter(s) IntraMuscular once  insulin glargine Injectable (LANTUS) 20 Unit(s) SubCutaneous at bedtime  insulin lispro (ADMELOG) corrective regimen sliding scale   SubCutaneous three times a day before meals  insulin lispro (ADMELOG) corrective regimen sliding scale   SubCutaneous at bedtime  insulin lispro Injectable (ADMELOG) 9 Unit(s) SubCutaneous three times a day before meals  lactulose Syrup 15 Gram(s) Oral daily  methylPREDNISolone 8 milliGRAM(s) Oral daily  mexiletine 200 milliGRAM(s) Oral three times a day  multivitamin 1 Tablet(s) Oral daily  pantoprazole    Tablet 40 milliGRAM(s) Oral before breakfast  polyethylene glycol 3350 17 Gram(s) Oral two times a day  pregabalin 150 milliGRAM(s) Oral two times a day  senna 2 Tablet(s) Oral at bedtime  sodium chloride 7% Inhalation 4 milliLiter(s) Inhalation every 12 hours  zinc sulfate 220 milliGRAM(s) Oral daily    MEDICATIONS  (PRN):  acetaminophen     Tablet .. 975 milliGRAM(s) Oral every 6 hours PRN Temp greater or equal to 38C (100.4F), Mild Pain (1 - 3)  aluminum hydroxide/magnesium hydroxide/simethicone Suspension 30 milliLiter(s) Oral every 4 hours PRN Dyspepsia  dextrose Oral Gel 15 Gram(s) Oral once PRN Blood Glucose LESS THAN 70 milliGRAM(s)/deciliter  dextrose Oral Gel 15 Gram(s) Oral once PRN Blood Glucose LESS THAN 70 milliGRAM(s)/deciliter  dextrose Oral Gel 15 Gram(s) Oral once PRN Blood Glucose LESS THAN 70 milliGRAM(s)/deciliter  ondansetron Injectable 4 milliGRAM(s) IV Push every 8 hours PRN Nausea and/or Vomiting      CAPILLARY BLOOD GLUCOSE      POCT Blood Glucose.: 210 mg/dL (06 Apr 2022 21:01)  POCT Blood Glucose.: 262 mg/dL (06 Apr 2022 16:50)  POCT Blood Glucose.: 330 mg/dL (06 Apr 2022 11:21)  POCT Blood Glucose.: 260 mg/dL (06 Apr 2022 08:11)    I&O's Summary    05 Apr 2022 07:01  -  06 Apr 2022 07:00  --------------------------------------------------------  IN: 1374 mL / OUT: 975 mL / NET: 399 mL    06 Apr 2022 07:01  -  07 Apr 2022 06:46  --------------------------------------------------------  IN: 720 mL / OUT: 1250 mL / NET: -530 mL        PHYSICAL EXAM:  Vital Signs Last 24 Hrs  T(C): 36.4 (07 Apr 2022 04:42), Max: 37 (06 Apr 2022 11:36)  T(F): 97.6 (07 Apr 2022 04:42), Max: 98.6 (06 Apr 2022 11:36)  HR: 88 (07 Apr 2022 04:42) (88 - 99)  BP: 112/70 (07 Apr 2022 04:42) (112/67 - 120/61)  BP(mean): --  RR: 16 (07 Apr 2022 04:42) (16 - 19)  SpO2: 95% (07 Apr 2022 04:42) (90% - 95%)  CONSTITUTIONAL: alert, comfortably laying in bed, not in acute distress, conversant,   EYES: No conjunctival or scleral injection, non-icteric;   ENMT: No external nasal lesions; MMM  NECK: Trachea midline without palpable neck mass; thyroid not enlarged and non-tender  RESPIRATORY: Breathing comfortably, speaking in full sentences, not using accessory mm to breathe, +rhonchi bilaterally, no wheezes or no crackles  CARDIOVASCULAR: +S1S2, RRR, no M/G/R; pedal pulses full and symmetric; no lower extremity edema  GASTROINTESTINAL: No palpable masses or tenderness, +BS throughout, no rebound/guarding; no hernia palpated, colostomy in place with stool output, pink without erythema.   LYMPHATIC: No cervical LAD or tenderness  NEUROLOGIC: nonfocal, moves all extremities spontaneously,   PSYCHIATRIC: A+O x 3; mood and affect appropriate; appropriate insight and judgment   LABS:                        10.7 11.01 )-----------( 394      ( 06 Apr 2022 06:49 )             36.1     04-06    138  |  99  |  13  ----------------------------<  198<H>  4.7   |  29  |  0.51    Ca    9.1      06 Apr 2022 06:49  Phos  2.2     04-06  Mg     1.8     04-06    TPro  6.2  /  Alb  2.7<L>  /  TBili  0.3  /  DBili  x   /  AST  26  /  ALT  51<H>  /  AlkPhos  116  04-06                RADIOLOGY & ADDITIONAL TESTS:  Results Reviewed:   Imaging Personally Reviewed:  Electrocardiogram Personally Reviewed:    COORDINATION OF CARE:  Care Discussed with Consultants/Other Providers [Y/N]:  Prior or Outpatient Records Reviewed [Y/N]:

## 2022-04-08 ENCOUNTER — NON-APPOINTMENT (OUTPATIENT)
Age: 72
End: 2022-04-08

## 2022-04-08 LAB
% ALBUMIN: 40.9 % — SIGNIFICANT CHANGE UP
% ALPHA 1: 9.8 % — SIGNIFICANT CHANGE UP
% ALPHA 2: 16.5 % — SIGNIFICANT CHANGE UP
% BETA: 18.9 % — SIGNIFICANT CHANGE UP
% GAMMA: 13.9 % — SIGNIFICANT CHANGE UP
% M SPIKE: SIGNIFICANT CHANGE UP
ALBUMIN SERPL ELPH-MCNC: 2.2 G/DL — LOW (ref 3.6–5.5)
ALBUMIN/GLOB SERPL ELPH: 0.7 RATIO — SIGNIFICANT CHANGE UP
ALPHA1 GLOB SERPL ELPH-MCNC: 0.5 G/DL — HIGH (ref 0.1–0.4)
ALPHA2 GLOB SERPL ELPH-MCNC: 0.9 G/DL — SIGNIFICANT CHANGE UP (ref 0.5–1)
B-GLOBULIN SERPL ELPH-MCNC: 1 G/DL — SIGNIFICANT CHANGE UP (ref 0.5–1)
GAMMA GLOBULIN: 0.8 G/DL — SIGNIFICANT CHANGE UP (ref 0.6–1.6)
INTERPRETATION SERPL IFE-IMP: SIGNIFICANT CHANGE UP
M-SPIKE: SIGNIFICANT CHANGE UP (ref 0–0)
PROT PATTERN SERPL ELPH-IMP: SIGNIFICANT CHANGE UP
PROT PATTERN SERPL ELPH-IMP: SIGNIFICANT CHANGE UP

## 2022-04-10 NOTE — H&P ADULT - PSH
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  H/O pelvic surgery  5 years ago - s/p fracture  H/O total knee replacement, bilateral  5 years ago  History of partial hysterectomy  30 years ago - fibroids  History of sinus surgery  multiple sinus surgeries  History of tracheomalacia  2015 - attempted tracheal stenting ([facility])- course complicated by obstruction, respiratory failure, multiple CPR attempts -  stent discontinued; 10/20/2016 Tracheobronchoplasty (Prolene Mesh) performed at [facility] by Dr Zapien  Rectal bleeding  exam under anesthesia ([facility]) 2/2018  S/P bronchoscopy  6/5/2018 - [facility] (Dr Zapien) no evidence of tracheobronchomalacia in trachea or bronchial tubes Yes

## 2022-04-11 PROBLEM — Z11.59 SCREENING FOR VIRAL DISEASE: Status: ACTIVE | Noted: 2020-06-02

## 2022-04-19 NOTE — ED ADULT TRIAGE NOTE - AS O2 DELIVERY
room air Localized Dermabrasion With Wire Brush Text: The patient was draped in routine manner.  Localized dermabrasion using 3 x 17 mm wire brush was performed in routine manner to papillary dermis. This spot dermabrasion is being performed to complete skin cancer reconstruction. It also will eliminate the other sun damaged precancerous cells that are known to be part of the regional effect of a lifetime's worth of sun exposure. This localized dermabrasion is therapeutic and should not be considered cosmetic in any regard. Localized Dermabrasion Text: The patient was draped in routine manner.  Localized dermabrasion using 3 x 17 mm wire brush was performed in routine manner to papillary dermis. This spot dermabrasion is being performed to complete skin cancer reconstruction. It also will eliminate the other sun damaged precancerous cells that are known to be part of the regional effect of a lifetime's worth of sun exposure. This localized dermabrasion is therapeutic and should not be considered cosmetic in any regard.

## 2022-04-22 NOTE — PROGRESS NOTE ADULT - SUBJECTIVE AND OBJECTIVE BOX
CHIEF COMPLAINT: f/up sob, chronic resp failure, TBM, severe asthma, allergic rhinitis-more comfortable but remains weak    Interval Events: none-no ambulation or OOB    REVIEW OF SYSTEMS:  Constitutional: No fevers or chills. No weight loss. + fatigue or generalized malaise.  Eyes: No itching or discharge from the eyes  ENT: No ear pain. No ear discharge. No nasal congestion. No post nasal drip. No epistaxis. No throat pain. No sore throat. No difficulty swallowing.   CV: No chest pain. No palpitations. No lightheadedness or dizziness.   Resp: No dyspnea at rest. + dyspnea on exertion. No orthopnea. + wheezing. + cough. No stridor. No sputum production. No chest pain with respiration.  GI: No nausea. No vomiting. No diarrhea.  MSK: No joint pain or pain in any extremities  Integumentary: No skin lesions. No pedal edema.  Neurological: + gross motor weakness. No sensory changes.  [+ ] All other systems negative  [ ] Unable to assess ROS because ________    OBJECTIVE:  ICU Vital Signs Last 24 Hrs  T(C): 37.1 (31 Mar 2022 00:10), Max: 39.4 (30 Mar 2022 16:34)  T(F): 98.8 (31 Mar 2022 00:10), Max: 102.9 (30 Mar 2022 16:34)  HR: 88 (31 Mar 2022 00:10) (88 - 118)  BP: 136/71 (31 Mar 2022 00:10) (102/54 - 160/69)  BP(mean): --  ABP: --  ABP(mean): --  RR: 19 (31 Mar 2022 00:10) (19 - 19)  SpO2: 88% (31 Mar 2022 00:10) (88% - 92%)        03-29 @ 07:01  -  03-30 @ 07:00  --------------------------------------------------------  IN: 0 mL / OUT: 1 mL / NET: -1 mL    03-30 @ 07:01  -  03-31 @ 05:29  --------------------------------------------------------  IN: 340 mL / OUT: 600 mL / NET: -260 mL      CAPILLARY BLOOD GLUCOSE      POCT Blood Glucose.: 239 mg/dL (30 Mar 2022 21:55)      PHYSICAL EXAM: NAD in bed on O2 NC  General: Awake, alert, oriented X 3.   HEENT: Atraumatic, normocephalic.                 Mallampatti Grade 2                No nasal congestion.                No tonsillar or pharyngeal exudates.  Lymph Nodes: No palpable lymphadenopathy  Neck: No JVD. No carotid bruit.   Respiratory: Normal chest expansion                         Normal percussion                         Normal and equal air entry                         + exp wheeze, rhonchi or rales.  Cardiovascular: S1 S2 normal. No murmurs, rubs or gallops.   Abdomen: Soft, non-tender, non-distended. No organomegaly. Normoactive bowel sounds.  Extremities: Warm to touch. Peripheral pulse palpable. No pedal edema.   Skin: No rashes or skin lesions  Neurological: Motor and sensory examination equal and abnormal in all four extremities.  Psychiatry: Appropriate mood and affect.    HOSPITAL MEDICATIONS:  MEDICATIONS  (STANDING):  albuterol/ipratropium for Nebulization 3 milliLiter(s) Nebulizer every 6 hours  apixaban 5 milliGRAM(s) Oral every 12 hours  atorvastatin 20 milliGRAM(s) Oral at bedtime  cefepime   IVPB 2000 milliGRAM(s) IV Intermittent every 8 hours  chlorhexidine 2% Cloths 1 Application(s) Topical <User Schedule>  dextrose 5%. 1000 milliLiter(s) (50 mL/Hr) IV Continuous <Continuous>  dextrose 5%. 1000 milliLiter(s) (100 mL/Hr) IV Continuous <Continuous>  dextrose 50% Injectable 25 Gram(s) IV Push once  dextrose 50% Injectable 12.5 Gram(s) IV Push once  dextrose 50% Injectable 25 Gram(s) IV Push once  fluconAZOLE IVPB 100 milliGRAM(s) IV Intermittent every 24 hours  glucagon  Injectable 1 milliGRAM(s) IntraMuscular once  influenza  Vaccine (HIGH DOSE) 0.7 milliLiter(s) IntraMuscular once  insulin glargine Injectable (LANTUS) 10 Unit(s) SubCutaneous at bedtime  insulin lispro (ADMELOG) corrective regimen sliding scale   SubCutaneous three times a day before meals  insulin lispro (ADMELOG) corrective regimen sliding scale   SubCutaneous at bedtime  methylPREDNISolone 8 milliGRAM(s) Oral daily  mexiletine 200 milliGRAM(s) Oral three times a day  mupirocin 2% Ointment 1 Application(s) Both Nostrils two times a day  pantoprazole    Tablet 40 milliGRAM(s) Oral before breakfast  polyethylene glycol 3350 17 Gram(s) Oral daily  pregabalin 150 milliGRAM(s) Oral two times a day  senna 2 Tablet(s) Oral at bedtime  sodium chloride 7% Inhalation 4 milliLiter(s) Inhalation every 12 hours  vancomycin  IVPB 1000 milliGRAM(s) IV Intermittent <User Schedule>    MEDICATIONS  (PRN):  acetaminophen     Tablet .. 975 milliGRAM(s) Oral every 6 hours PRN Temp greater or equal to 38C (100.4F), Mild Pain (1 - 3)  dextrose Oral Gel 15 Gram(s) Oral once PRN Blood Glucose LESS THAN 70 milliGRAM(s)/deciliter  ondansetron Injectable 4 milliGRAM(s) IV Push every 8 hours PRN Nausea and/or Vomiting      LABS:                        10.8   7.42  )-----------( 185      ( 30 Mar 2022 07:14 )             35.3     03-30    135  |  100  |  22  ----------------------------<  249<H>  4.4   |  24  |  0.43<L>    Ca    8.5      30 Mar 2022 07:13  Phos  1.4     03-30  Mg     1.8     03-30    TPro  5.8<L>  /  Alb  2.6<L>  /  TBili  0.3  /  DBili  x   /  AST  27  /  ALT  46<H>  /  AlkPhos  102  03-30        Arterial Blood Gas:  03-30 @ 04:42  7.47/40/66/29/95.3/5.0  ABG lactate: --    Venous Blood Gas:  03-29 @ 09:36  7.37/44/49/25/81.9  VBG Lactate: 1.6      MICROBIOLOGY:     RADIOLOGY:  [ ] Reviewed and interpreted by me    Point of Care Ultrasound Findings:    PFT:    EKG: 69

## 2022-04-24 ENCOUNTER — INPATIENT (INPATIENT)
Facility: HOSPITAL | Age: 72
LOS: 3 days | Discharge: NURSING, REHAB OR LONG-TERM CARE | DRG: 392 | End: 2022-04-28
Attending: HOSPITALIST | Admitting: HOSPITALIST
Payer: MEDICARE

## 2022-04-24 VITALS
SYSTOLIC BLOOD PRESSURE: 164 MMHG | HEIGHT: 66 IN | TEMPERATURE: 98 F | WEIGHT: 125 LBS | DIASTOLIC BLOOD PRESSURE: 98 MMHG | OXYGEN SATURATION: 98 % | HEART RATE: 94 BPM | RESPIRATION RATE: 18 BRPM

## 2022-04-24 DIAGNOSIS — H05.352 EXOSTOSIS OF LEFT ORBIT: Chronic | ICD-10-CM

## 2022-04-24 DIAGNOSIS — Z98.89 OTHER SPECIFIED POSTPROCEDURAL STATES: Chronic | ICD-10-CM

## 2022-04-24 DIAGNOSIS — Z98.890 OTHER SPECIFIED POSTPROCEDURAL STATES: Chronic | ICD-10-CM

## 2022-04-24 DIAGNOSIS — T18.128A FOOD IN ESOPHAGUS CAUSING OTHER INJURY, INITIAL ENCOUNTER: ICD-10-CM

## 2022-04-24 DIAGNOSIS — Z96.653 PRESENCE OF ARTIFICIAL KNEE JOINT, BILATERAL: Chronic | ICD-10-CM

## 2022-04-24 DIAGNOSIS — K62.5 HEMORRHAGE OF ANUS AND RECTUM: Chronic | ICD-10-CM

## 2022-04-24 DIAGNOSIS — Z87.09 PERSONAL HISTORY OF OTHER DISEASES OF THE RESPIRATORY SYSTEM: Chronic | ICD-10-CM

## 2022-04-24 LAB
HCT VFR BLD CALC: 33.3 % — LOW (ref 34.5–45)
HGB BLD-MCNC: 9.7 G/DL — LOW (ref 11.5–15.5)
MCHC RBC-ENTMCNC: 22.9 PG — LOW (ref 27–34)
MCHC RBC-ENTMCNC: 29.1 GM/DL — LOW (ref 32–36)
MCV RBC AUTO: 78.5 FL — LOW (ref 80–100)
PLATELET # BLD AUTO: 244 K/UL — SIGNIFICANT CHANGE UP (ref 150–400)
RBC # BLD: 4.24 M/UL — SIGNIFICANT CHANGE UP (ref 3.8–5.2)
RBC # FLD: 21.2 % — HIGH (ref 10.3–14.5)
WBC # BLD: 10.09 K/UL — SIGNIFICANT CHANGE UP (ref 3.8–10.5)
WBC # FLD AUTO: 10.09 K/UL — SIGNIFICANT CHANGE UP (ref 3.8–10.5)

## 2022-04-24 PROCEDURE — 93010 ELECTROCARDIOGRAM REPORT: CPT

## 2022-04-24 PROCEDURE — 99285 EMERGENCY DEPT VISIT HI MDM: CPT

## 2022-04-24 PROCEDURE — 99223 1ST HOSP IP/OBS HIGH 75: CPT

## 2022-04-24 PROCEDURE — 71045 X-RAY EXAM CHEST 1 VIEW: CPT | Mod: 26

## 2022-04-24 RX ORDER — METOCLOPRAMIDE HCL 10 MG
5 TABLET ORAL ONCE
Refills: 0 | Status: COMPLETED | OUTPATIENT
Start: 2022-04-24 | End: 2022-04-24

## 2022-04-24 NOTE — ED PROVIDER NOTE - NSICDXPASTSURGICALHX_GEN_ALL_CORE_FT
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    H/O pelvic surgery 5 years ago - s/p fracture    H/O total knee replacement, bilateral 5 years ago    History of partial hysterectomy 30 years ago - fibroids    History of sinus surgery multiple sinus surgeries    History of tracheomalacia 2015 - attempted tracheal stenting ([facility])- course complicated by obstruction, respiratory failure, multiple CPR attempts -  stent discontinued; 10/20/2016 Tracheobronchoplasty (Prolene Mesh) performed at [facility] by Dr Zapien    Rectal bleeding exam under anesthesia ([facility]) 2/2018    S/P bronchoscopy 6/5/2018 - [facility] (Dr Zapien) no evidence of tracheobronchomalacia in trachea or bronchial tubes

## 2022-04-24 NOTE — ED PROVIDER NOTE - CLINICAL SUMMARY MEDICAL DECISION MAKING FREE TEXT BOX
71yF with extensive PMHx including recent mesh placement to epiglottic area to prevent aspiration presenting with food impaction. Vital signs normal, 97% on RA. Exam shows no abd tenderness, pt is comfortable and tolerating secretions. Will consult GI for endoscopy and admit for procedure. Labs and CXR for admission.

## 2022-04-24 NOTE — ED PROVIDER NOTE - OBJECTIVE STATEMENT
71yF with  tracheobronchomalasia s/p tracheobronchoplasty, bronchiectasis, severe persistent asthma (steroid dependent), adrenal insuffiencey on chronic steroids, DM2, HTN, colorectal cancer s/p total colectomy now with colostomy, PAF on Eliquis, 2 recent hospitalizations for pneumonia and bronchiectasis exacerbation requiring intubation most recently discharged to rehab ([facility] in [place]) on 4/7 presenting with food stuck in throat. She has been on pureed diet recently switched to soft foods and has been having difficulties with food getting stuck. Per her daughter she recently had mesh placed near the epiglottis due to frequent aspiration. today she ate some cut up chicken and rice and felt it get stuck in her lower chest, was able to drink some broth to wash it out and spit it up but still feels there is pressure and discomfort in the area with possible retained food. Pt Is able to swallow her saliva, denies nausea, vomiting, chest pain shortness of breath.

## 2022-04-24 NOTE — H&P ADULT - NSHPPHYSICALEXAM_GEN_ALL_CORE
Vital Signs Last 24 Hrs  T(C): 36.9 (24 Apr 2022 18:13), Max: 36.9 (24 Apr 2022 18:13)  T(F): 98.4 (24 Apr 2022 18:13), Max: 98.4 (24 Apr 2022 18:13)  HR: 86 (24 Apr 2022 18:47) (86 - 94)  BP: 135/59 (24 Apr 2022 18:47) (135/59 - 164/98)  BP(mean): 78 (24 Apr 2022 18:47) (78 - 78)  RR: 15 (24 Apr 2022 18:47) (15 - 18)  SpO2: 100% (24 Apr 2022 18:47) (98% - 100%) Vital Signs Last 24 Hrs  T(C): 36.9 (24 Apr 2022 18:13), Max: 36.9 (24 Apr 2022 18:13)  T(F): 98.4 (24 Apr 2022 18:13), Max: 98.4 (24 Apr 2022 18:13)  HR: 86 (24 Apr 2022 18:47) (86 - 94)  BP: 135/59 (24 Apr 2022 18:47) (135/59 - 164/98)  BP(mean): 78 (24 Apr 2022 18:47) (78 - 78)  RR: 15 (24 Apr 2022 18:47) (15 - 18)  SpO2: 100% (24 Apr 2022 18:47) (98% - 100%)    GENERAL: No acute distress, well-developed  HEAD:  Atraumatic, Normocephalic  ENT: EOMI, PERRLA, conjunctiva and sclera clear,  moist mucosa no pharyngeal erythema or exudates   NECK: supple , no JVD   CHEST/LUNG: right chest port , lungs clear to auscultation bilaterally; No wheeze, equal breath sounds bilaterally   BACK: No spinal tenderness,  No CVA tenderness   HEART: Regular rate and rhythm; No murmurs, rubs, or gallops  ABDOMEN: ostomy intact , Soft, Nontender, Nondistended; hypoactive Bowel sounds present  EXTREMITIES:  No clubbing, cyanosis, or edema  MSK: No joint swelling or effusions, ROM intact   PSYCH: Normal behavior/affect  NEUROLOGY: AAOx3, non-focal, cranial nerves intact  SKIN: Normal color, No rashes or lesions

## 2022-04-24 NOTE — ED PROVIDER NOTE - ATTENDING CONTRIBUTION TO CARE
Attending MD Luis:  I personally have seen and examined this patient. I have performed a substantive portion of the visit including all aspects of the medical decision making.  Resident note reviewed and agree on plan of care and except where noted.        71 year old woman with history of tracheobronchomalasia s/p tracheobronchoplasty, bronchiectasis, severe persistent asthma (steroid dependent), adrenal insuffiencey on chronic steroids, DM2, HTN, colorectal cancer s/p total colectomy now with colostomy, PAF on Eliquis presenting with FB sensation in upper chest and inability to tolerate PO as she vomits up when she tries to take PO. Patient tolerating her secretions now, nontender abdomen. Concern would be for partial food impaction, will have to admit for GI consultation to consider EGD         *The above represents an initial assessment/impression. Please refer to progress notes for potential changes in patient clinical course*

## 2022-04-24 NOTE — H&P ADULT - PROBLEM SELECTOR PLAN 3
- 1/2 dose Lantus  while npo   -  hold mealtime  insulin while npo   - insulin correction scale  - check fsg qac/qhs

## 2022-04-24 NOTE — H&P ADULT - HISTORY OF PRESENT ILLNESS
71 year old female with past medical history significant for  tracheobronchomalasia s/p tracheobronchoplasty, bronchiectasis, severe persistent asthma (steroid dependent), adrenal insuffiencey on chronic steroids, DM2, HTN, colorectal cancer s/p total colectomy now with colostomy, PAF on Eliquis, 2 recent hospitalizations for pneumonia and bronchiectasis exacerbation requiring intubation most recently discharged to rehab ([facility] in [place]) on 4/7 presenting with sensation of  food stuck in throat for the past day 71 year old female with past medical history significant for  tracheobronchomalasia s/p tracheobronchoplasty, bronchiectasis, severe persistent asthma (steroid dependent), adrenal insuffiencey on chronic steroids, DM2, HTN, colorectal cancer s/p total colectomy now with colostomy, PAF on Eliquis, 2 recent hospitalizations for pneumonia and bronchiectasis exacerbation requiring intubation most recently discharged to rehab ([facility] in [place]) on 4/7 presenting with sensation of  food stuck in throat for the past day.   Patient reports feeling as if food was stuck in her throat after eating some rice and chicken, she tried to queta it down with soup , but ended up vomiting it up.  She reports residual chest discomfort , but not spitting up anything else and swallowing her saliva. She reports no nausea , no abdominal pain , she has normal ostomy output , is passing gas . She reports no difficulty breathing , no wheezing.   She has been on pureed diet recently switched to soft foods. Per family,  she had mesh placed near the epiglottis due to frequent aspiration

## 2022-04-24 NOTE — ED PROVIDER NOTE - NS ED ROS FT
CONSTITUTIONAL - No fever, No weight change, No lightheadedness  SKIN - No rash  HEMATOLOGIC - No abnormal bleeding or bruising  EYES - No eye pain, No blurred vision  ENT - No change in hearing, No sore throat, No neck pain, No rhinorrhea, No ear pain  RESPIRATORY - No shortness of breath, No cough  CARDIAC -No chest pain, No palpitations, (+) chest discomfort  GI - No abdominal pain, No nausea, No vomiting, No diarrhea, No constipation  - No dysuria, no frequency, no hematuria.   MUSCULOSKELETAL - No joint pain, No swelling, No back pain  NEUROLOGIC - No numbness, No focal weakness, No headache, No dizziness

## 2022-04-24 NOTE — ED ADULT NURSE NOTE - NSIMPLEMENTINTERV_GEN_ALL_ED
Implemented All Fall with Harm Risk Interventions:  [place] to call system. Call bell, personal items and telephone within reach. Instruct patient to call for assistance. Room bathroom lighting operational. Non-slip footwear when patient is off stretcher. Physically safe environment: no spills, clutter or unnecessary equipment. Stretcher in lowest position, wheels locked, appropriate side rails in place. Provide visual cue, wrist band, yellow gown, etc. Monitor gait and stability. Monitor for mental status changes and reorient to person, place, and time. Review medications for side effects contributing to fall risk. Reinforce activity limits and safety measures with patient and family. Provide visual clues: red socks.

## 2022-04-24 NOTE — H&P ADULT - NSICDXPASTSURGICALHX_GEN_ALL_CORE_FT
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 by Dr Zapien    Rectal bleeding exam under anesthesia ([facility]) 2/2018    S/P bronchoscopy 6/5/2018 - [facility] (Dr Zapien) no evidence of tracheobronchomalacia in trachea or bronchial tubes

## 2022-04-24 NOTE — ED PROVIDER NOTE - PHYSICAL EXAMINATION
GENERAL: Sitting comfortably in bed in no acute distress  NEURO: Alert and Oriented to person, place, date and situation. Pupils symmetric, No ptosis. No facial asymmetry or dysarthria, no tremor noted.  HEENT: No conjunctival injection or scleral icterus.   CARD: Normal rate and regular rhythm, no murmurs and no gallops appreciated.  RESP: diffuse expiratory rhonchi, occasional inspiratory wheeze, good respiratory effort.  ABD: Bowel sounds active. Nondistended, Soft and nontender to palpation in all quadrants, no guarding, no rigidity. No masses appreciated.  EXT: No pedal edema. 2+DP pulses bilaterally.

## 2022-04-24 NOTE — H&P ADULT - NSHPREVIEWOFSYSTEMS_GEN_ALL_CORE
CONSTITUTIONAL: No weakness, fevers or chills  EYES/ENT: No visual changes;  + dysphagia  NECK: No pain or stiffness  RESPIRATORY: No cough, wheezing, hemoptysis; No shortness of breath  CARDIOVASCULAR: No chest pain or palpitations; No lower extremity edema  EXTREMITIES: no le edema, cyanosis, clubbing  GASTROINTESTINAL: No abdominal or epigastric pain. No nausea, vomiting, or hematemesis; No diarrhea or constipation. No melena or hematochezia.  BACK: No back pain  GENITOURINARY: No dysuria, frequency or hematuria  NEUROLOGICAL: No numbness or weakness  MSK: no joint swelling or pain  SKIN: No itching, burning, rashes, or lesions   PSYCH: no agitation  All other review of systems is negative unless indicated above.

## 2022-04-24 NOTE — ED PROVIDER NOTE - DISPOSITION TYPE
Patient: Dionne Bond    Procedure Summary     Date:  07/03/18 Room / Location:  [facility] ENDOSCOPY 2 /  [facility] OR    Anesthesia Start:  0855 Anesthesia Stop:  0925    Procedure:  COLONOSCOPY with polypectomy (N/A ) Diagnosis:       Colon polyp      Internal hemorrhoids      (Colon cancer screening [Z12.11])    Surgeon:  Annette Trinidad MD Provider:  Rochelle Contreras CRNA    Anesthesia Type:  MAC ASA Status:  3          Anesthesia Type: MAC  Last vitals  BP   123/69 (07/03/18 0930)   Temp   97.8 °F (36.6 °C) (07/03/18 0930)   Pulse   64 (07/03/18 0930)   Resp   16 (07/03/18 0806)     SpO2   99 % (07/03/18 0930)     Post Anesthesia Care and Evaluation    Patient location during evaluation: bedside  Patient participation: complete - patient participated  Level of consciousness: awake and alert  Pain score: 0  Pain management: adequate  Airway patency: patent  Anesthetic complications: No anesthetic complications  PONV Status: none  Cardiovascular status: acceptable  Respiratory status: acceptable  Hydration status: acceptable      
ADMIT

## 2022-04-24 NOTE — ED ADULT NURSE REASSESSMENT NOTE - NS ED NURSE REASSESS COMMENT FT1
Port accessed by 3 ED RNs, sterile technique utilized. Blood and COVID swab collected and sent to lab. Pt resting comfortably in stretcher, aware of plan of care, call bell in reach. Pt admitted to medicine for food impaction of esophagus; pending admission bed.

## 2022-04-24 NOTE — ED ADULT NURSE NOTE - OBJECTIVE STATEMENT
72 yo female with a PMH of TIA, DVT, seizure, colorectal Ca s/p ostomy, tracheobronchomalacia, DM, a.jimena presents to the ED via EMS from living facility complaining of abdominal pain. Patient states  she was eating soup and rice today when "I felt like it got stuck." States she feels like food is stuck in her epigastric. Vomited once at facility prior to EMS arrival. Patient still complaining of pain. Noted to have a pressure ulcer on sacrum, covered with gauze at this time. Yellow/red fluid noted on gauze. Denies headache, dizziness, vision changes, chest pain, shortness of breath, diarrhea, fevers, chills, dysuria, hematuria, recent illness travel or fall. 72 yo female with a PMH of TIA, DVT, seizure, colorectal Ca s/p ostomy, tracheobronchomalacia, DM, a.jimena presents to the ED via EMS from living facility complaining of abdominal pain. Patient states  she was eating soup and rice today when "I felt like it got stuck." States she feels like food is stuck in her epigastric. Vomited once at facility prior to EMS arrival. Patient still complaining of pain. Abdomen is soft, non-distended. Tender in epigastric. Endorsing mild nausea at this time. Noted to have a pressure ulcer on sacrum, covered with gauze at this time. Wound is red and beefy, stage 2. Denies headache, dizziness, vision changes, chest pain, shortness of breath, diarrhea, fevers, chills, dysuria, hematuria, recent illness travel or fall.

## 2022-04-24 NOTE — ED ADULT NURSE REASSESSMENT NOTE - NS ED NURSE REASSESS COMMENT FT1
pt repositioned to right side and adjusted in bed. Pt waiting to speak with MD. No other complaints at this time.

## 2022-04-24 NOTE — ED PROVIDER NOTE - PROGRESS NOTE DETAILS
Brandyn FARLEY, EM/IM PGY-1: Spoke to GI fellow, plan for endoscopy in AM. Admitted for procedure. pt remains stable and CXR shows clear lungs and no radio-opaque foreign body. GI would like reglan 5mg, her EKG on admission showed QTc 380 will give med.

## 2022-04-24 NOTE — H&P ADULT - NSHPLABSRESULTS_GEN_ALL_CORE
Labs personally reviewed:                          9.7    10.09 )-----------( 244      ( 24 Apr 2022 23:38 )             33.3                     CAPILLARY BLOOD GLUCOSE          Imaging:  CXR personally reviewed: Clear lungs.  No radiopaque foreign body.    EKG Labs personally reviewed:                          9.7    10.09 )-----------( 244      ( 24 Apr 2022 23:38 )             33.3                     CAPILLARY BLOOD GLUCOSE          Imaging:  CXR personally reviewed: Clear lungs.  No radiopaque foreign body.    EKG nsr at 63 bpm

## 2022-04-24 NOTE — H&P ADULT - PROBLEM SELECTOR PLAN 1
low suspicion for food impaction , history c/w dysphagia , recently upgraded from pureed , will keep NPO except medications for now until further evaluated ,   - speech and swallow evaluation   - GI consult - notified by ED  , day team to follow up recommendations   - NPO except medications , if unable to tolerate crushed meds in apple sauce , can reformulate cardiac medications to IV if available

## 2022-04-25 ENCOUNTER — NON-APPOINTMENT (OUTPATIENT)
Age: 72
End: 2022-04-25

## 2022-04-25 DIAGNOSIS — A41.9 SEPSIS, UNSPECIFIED ORGANISM: ICD-10-CM

## 2022-04-25 DIAGNOSIS — I48.91 UNSPECIFIED ATRIAL FIBRILLATION: ICD-10-CM

## 2022-04-25 DIAGNOSIS — R13.10 DYSPHAGIA, UNSPECIFIED: ICD-10-CM

## 2022-04-25 DIAGNOSIS — E27.40 UNSPECIFIED ADRENOCORTICAL INSUFFICIENCY: ICD-10-CM

## 2022-04-25 DIAGNOSIS — Z29.9 ENCOUNTER FOR PROPHYLACTIC MEASURES, UNSPECIFIED: ICD-10-CM

## 2022-04-25 DIAGNOSIS — E11.9 TYPE 2 DIABETES MELLITUS WITHOUT COMPLICATIONS: ICD-10-CM

## 2022-04-25 DIAGNOSIS — J44.9 CHRONIC OBSTRUCTIVE PULMONARY DISEASE, UNSPECIFIED: ICD-10-CM

## 2022-04-25 LAB
ALBUMIN SERPL ELPH-MCNC: 2.7 G/DL — LOW (ref 3.3–5)
ALP SERPL-CCNC: 64 U/L — SIGNIFICANT CHANGE UP (ref 40–120)
ALT FLD-CCNC: 15 U/L — SIGNIFICANT CHANGE UP (ref 10–45)
ANION GAP SERPL CALC-SCNC: 10 MMOL/L — SIGNIFICANT CHANGE UP (ref 5–17)
ANION GAP SERPL CALC-SCNC: 13 MMOL/L — SIGNIFICANT CHANGE UP (ref 5–17)
APTT BLD: 30.1 SEC — SIGNIFICANT CHANGE UP (ref 27.5–35.5)
AST SERPL-CCNC: 16 U/L — SIGNIFICANT CHANGE UP (ref 10–40)
BASOPHILS # BLD AUTO: 0.09 K/UL — SIGNIFICANT CHANGE UP (ref 0–0.2)
BASOPHILS NFR BLD AUTO: 0.9 % — SIGNIFICANT CHANGE UP (ref 0–2)
BILIRUB SERPL-MCNC: 0.2 MG/DL — SIGNIFICANT CHANGE UP (ref 0.2–1.2)
BUN SERPL-MCNC: 10 MG/DL — SIGNIFICANT CHANGE UP (ref 7–23)
BUN SERPL-MCNC: 11 MG/DL — SIGNIFICANT CHANGE UP (ref 7–23)
CALCIUM SERPL-MCNC: 8.1 MG/DL — LOW (ref 8.4–10.5)
CALCIUM SERPL-MCNC: 8.4 MG/DL — SIGNIFICANT CHANGE UP (ref 8.4–10.5)
CHLORIDE SERPL-SCNC: 105 MMOL/L — SIGNIFICANT CHANGE UP (ref 96–108)
CHLORIDE SERPL-SCNC: 106 MMOL/L — SIGNIFICANT CHANGE UP (ref 96–108)
CO2 SERPL-SCNC: 25 MMOL/L — SIGNIFICANT CHANGE UP (ref 22–31)
CO2 SERPL-SCNC: 26 MMOL/L — SIGNIFICANT CHANGE UP (ref 22–31)
CREAT SERPL-MCNC: 0.34 MG/DL — LOW (ref 0.5–1.3)
CREAT SERPL-MCNC: 0.39 MG/DL — LOW (ref 0.5–1.3)
EGFR: 105 ML/MIN/1.73M2 — SIGNIFICANT CHANGE UP
EGFR: 109 ML/MIN/1.73M2 — SIGNIFICANT CHANGE UP
EOSINOPHIL # BLD AUTO: 0 K/UL — SIGNIFICANT CHANGE UP (ref 0–0.5)
EOSINOPHIL NFR BLD AUTO: 0 % — SIGNIFICANT CHANGE UP (ref 0–6)
GLUCOSE BLDC GLUCOMTR-MCNC: 111 MG/DL — HIGH (ref 70–99)
GLUCOSE BLDC GLUCOMTR-MCNC: 140 MG/DL — HIGH (ref 70–99)
GLUCOSE BLDC GLUCOMTR-MCNC: 140 MG/DL — HIGH (ref 70–99)
GLUCOSE BLDC GLUCOMTR-MCNC: 178 MG/DL — HIGH (ref 70–99)
GLUCOSE BLDC GLUCOMTR-MCNC: 94 MG/DL — SIGNIFICANT CHANGE UP (ref 70–99)
GLUCOSE SERPL-MCNC: 172 MG/DL — HIGH (ref 70–99)
GLUCOSE SERPL-MCNC: 92 MG/DL — SIGNIFICANT CHANGE UP (ref 70–99)
HCT VFR BLD CALC: 34.1 % — LOW (ref 34.5–45)
HGB BLD-MCNC: 10 G/DL — LOW (ref 11.5–15.5)
INR BLD: 1.21 RATIO — HIGH (ref 0.88–1.16)
LYMPHOCYTES # BLD AUTO: 0.44 K/UL — LOW (ref 1–3.3)
LYMPHOCYTES # BLD AUTO: 4.4 % — LOW (ref 13–44)
MCHC RBC-ENTMCNC: 22.8 PG — LOW (ref 27–34)
MCHC RBC-ENTMCNC: 29.3 GM/DL — LOW (ref 32–36)
MCV RBC AUTO: 77.9 FL — LOW (ref 80–100)
MONOCYTES # BLD AUTO: 0.27 K/UL — SIGNIFICANT CHANGE UP (ref 0–0.9)
MONOCYTES NFR BLD AUTO: 2.7 % — SIGNIFICANT CHANGE UP (ref 2–14)
NEUTROPHILS # BLD AUTO: 9.19 K/UL — HIGH (ref 1.8–7.4)
NEUTROPHILS NFR BLD AUTO: 91.1 % — HIGH (ref 43–77)
NRBC # BLD: 0 /100 WBCS — SIGNIFICANT CHANGE UP (ref 0–0)
PLATELET # BLD AUTO: 260 K/UL — SIGNIFICANT CHANGE UP (ref 150–400)
POTASSIUM SERPL-MCNC: 3.8 MMOL/L — SIGNIFICANT CHANGE UP (ref 3.5–5.3)
POTASSIUM SERPL-MCNC: 3.8 MMOL/L — SIGNIFICANT CHANGE UP (ref 3.5–5.3)
POTASSIUM SERPL-SCNC: 3.8 MMOL/L — SIGNIFICANT CHANGE UP (ref 3.5–5.3)
POTASSIUM SERPL-SCNC: 3.8 MMOL/L — SIGNIFICANT CHANGE UP (ref 3.5–5.3)
PROT SERPL-MCNC: 5.2 G/DL — LOW (ref 6–8.3)
PROTHROM AB SERPL-ACNC: 13.9 SEC — HIGH (ref 10.5–13.4)
RBC # BLD: 4.38 M/UL — SIGNIFICANT CHANGE UP (ref 3.8–5.2)
RBC # FLD: 21.2 % — HIGH (ref 10.3–14.5)
SARS-COV-2 RNA SPEC QL NAA+PROBE: SIGNIFICANT CHANGE UP
SODIUM SERPL-SCNC: 141 MMOL/L — SIGNIFICANT CHANGE UP (ref 135–145)
SODIUM SERPL-SCNC: 144 MMOL/L — SIGNIFICANT CHANGE UP (ref 135–145)
WBC # BLD: 11.34 K/UL — HIGH (ref 3.8–10.5)
WBC # FLD AUTO: 11.34 K/UL — HIGH (ref 3.8–10.5)

## 2022-04-25 PROCEDURE — 99233 SBSQ HOSP IP/OBS HIGH 50: CPT

## 2022-04-25 PROCEDURE — 99222 1ST HOSP IP/OBS MODERATE 55: CPT | Mod: GC

## 2022-04-25 PROCEDURE — 99223 1ST HOSP IP/OBS HIGH 75: CPT

## 2022-04-25 RX ORDER — ACETAMINOPHEN 500 MG
650 TABLET ORAL EVERY 6 HOURS
Refills: 0 | Status: DISCONTINUED | OUTPATIENT
Start: 2022-04-25 | End: 2022-04-28

## 2022-04-25 RX ORDER — IPRATROPIUM/ALBUTEROL SULFATE 18-103MCG
3 AEROSOL WITH ADAPTER (GRAM) INHALATION EVERY 6 HOURS
Refills: 0 | Status: DISCONTINUED | OUTPATIENT
Start: 2022-04-25 | End: 2022-04-28

## 2022-04-25 RX ORDER — SENNA PLUS 8.6 MG/1
2 TABLET ORAL AT BEDTIME
Refills: 0 | Status: DISCONTINUED | OUTPATIENT
Start: 2022-04-25 | End: 2022-04-28

## 2022-04-25 RX ORDER — SODIUM CHLORIDE 9 MG/ML
1000 INJECTION INTRAMUSCULAR; INTRAVENOUS; SUBCUTANEOUS
Refills: 0 | Status: COMPLETED | OUTPATIENT
Start: 2022-04-25 | End: 2022-04-26

## 2022-04-25 RX ORDER — INSULIN LISPRO 100/ML
VIAL (ML) SUBCUTANEOUS EVERY 6 HOURS
Refills: 0 | Status: DISCONTINUED | OUTPATIENT
Start: 2022-04-25 | End: 2022-04-26

## 2022-04-25 RX ORDER — SODIUM CHLORIDE 9 MG/ML
1000 INJECTION, SOLUTION INTRAVENOUS
Refills: 0 | Status: DISCONTINUED | OUTPATIENT
Start: 2022-04-25 | End: 2022-04-28

## 2022-04-25 RX ORDER — INSULIN GLARGINE 100 [IU]/ML
10 INJECTION, SOLUTION SUBCUTANEOUS AT BEDTIME
Refills: 0 | Status: DISCONTINUED | OUTPATIENT
Start: 2022-04-25 | End: 2022-04-25

## 2022-04-25 RX ORDER — DEXTROSE 50 % IN WATER 50 %
15 SYRINGE (ML) INTRAVENOUS ONCE
Refills: 0 | Status: DISCONTINUED | OUTPATIENT
Start: 2022-04-25 | End: 2022-04-28

## 2022-04-25 RX ORDER — FLUTICASONE PROPIONATE 50 MCG
1 SPRAY, SUSPENSION NASAL
Refills: 0 | Status: DISCONTINUED | OUTPATIENT
Start: 2022-04-25 | End: 2022-04-28

## 2022-04-25 RX ORDER — APIXABAN 2.5 MG/1
5 TABLET, FILM COATED ORAL EVERY 12 HOURS
Refills: 0 | Status: DISCONTINUED | OUTPATIENT
Start: 2022-04-25 | End: 2022-04-25

## 2022-04-25 RX ORDER — MEXILETINE HYDROCHLORIDE 150 MG/1
200 CAPSULE ORAL THREE TIMES A DAY
Refills: 0 | Status: DISCONTINUED | OUTPATIENT
Start: 2022-04-25 | End: 2022-04-28

## 2022-04-25 RX ORDER — GLUCAGON INJECTION, SOLUTION 0.5 MG/.1ML
1 INJECTION, SOLUTION SUBCUTANEOUS ONCE
Refills: 0 | Status: DISCONTINUED | OUTPATIENT
Start: 2022-04-25 | End: 2022-04-28

## 2022-04-25 RX ORDER — OXYCODONE AND ACETAMINOPHEN 5; 325 MG/1; MG/1
1 TABLET ORAL ONCE
Refills: 0 | Status: DISCONTINUED | OUTPATIENT
Start: 2022-04-25 | End: 2022-04-25

## 2022-04-25 RX ORDER — ATORVASTATIN CALCIUM 80 MG/1
20 TABLET, FILM COATED ORAL AT BEDTIME
Refills: 0 | Status: DISCONTINUED | OUTPATIENT
Start: 2022-04-25 | End: 2022-04-28

## 2022-04-25 RX ORDER — LACTULOSE 10 G/15ML
15 SOLUTION ORAL DAILY
Refills: 0 | Status: DISCONTINUED | OUTPATIENT
Start: 2022-04-25 | End: 2022-04-28

## 2022-04-25 RX ORDER — POLYETHYLENE GLYCOL 3350 17 G/17G
17 POWDER, FOR SOLUTION ORAL
Refills: 0 | Status: DISCONTINUED | OUTPATIENT
Start: 2022-04-25 | End: 2022-04-28

## 2022-04-25 RX ORDER — DEXTROSE 50 % IN WATER 50 %
12.5 SYRINGE (ML) INTRAVENOUS ONCE
Refills: 0 | Status: DISCONTINUED | OUTPATIENT
Start: 2022-04-25 | End: 2022-04-28

## 2022-04-25 RX ORDER — ONDANSETRON 8 MG/1
2 TABLET, FILM COATED ORAL ONCE
Refills: 0 | Status: DISCONTINUED | OUTPATIENT
Start: 2022-04-25 | End: 2022-04-28

## 2022-04-25 RX ORDER — INSULIN LISPRO 100/ML
VIAL (ML) SUBCUTANEOUS
Refills: 0 | Status: DISCONTINUED | OUTPATIENT
Start: 2022-04-25 | End: 2022-04-25

## 2022-04-25 RX ORDER — INSULIN LISPRO 100/ML
VIAL (ML) SUBCUTANEOUS AT BEDTIME
Refills: 0 | Status: DISCONTINUED | OUTPATIENT
Start: 2022-04-25 | End: 2022-04-25

## 2022-04-25 RX ORDER — PANTOPRAZOLE SODIUM 20 MG/1
40 TABLET, DELAYED RELEASE ORAL
Refills: 0 | Status: DISCONTINUED | OUTPATIENT
Start: 2022-04-25 | End: 2022-04-28

## 2022-04-25 RX ORDER — DEXTROSE 50 % IN WATER 50 %
25 SYRINGE (ML) INTRAVENOUS ONCE
Refills: 0 | Status: DISCONTINUED | OUTPATIENT
Start: 2022-04-25 | End: 2022-04-28

## 2022-04-25 RX ORDER — TIOTROPIUM BROMIDE 18 UG/1
1 CAPSULE ORAL; RESPIRATORY (INHALATION) DAILY
Refills: 0 | Status: DISCONTINUED | OUTPATIENT
Start: 2022-04-25 | End: 2022-04-28

## 2022-04-25 RX ADMIN — MEXILETINE HYDROCHLORIDE 200 MILLIGRAM(S): 150 CAPSULE ORAL at 06:28

## 2022-04-25 RX ADMIN — OXYCODONE AND ACETAMINOPHEN 1 TABLET(S): 5; 325 TABLET ORAL at 14:17

## 2022-04-25 RX ADMIN — Medication 5 MILLIGRAM(S): at 00:19

## 2022-04-25 RX ADMIN — MEXILETINE HYDROCHLORIDE 200 MILLIGRAM(S): 150 CAPSULE ORAL at 13:25

## 2022-04-25 RX ADMIN — ATORVASTATIN CALCIUM 20 MILLIGRAM(S): 80 TABLET, FILM COATED ORAL at 22:16

## 2022-04-25 RX ADMIN — LACTULOSE 15 GRAM(S): 10 SOLUTION ORAL at 13:26

## 2022-04-25 RX ADMIN — SODIUM CHLORIDE 20 MILLILITER(S): 9 INJECTION INTRAMUSCULAR; INTRAVENOUS; SUBCUTANEOUS at 22:17

## 2022-04-25 RX ADMIN — OXYCODONE AND ACETAMINOPHEN 1 TABLET(S): 5; 325 TABLET ORAL at 13:41

## 2022-04-25 RX ADMIN — MEXILETINE HYDROCHLORIDE 200 MILLIGRAM(S): 150 CAPSULE ORAL at 22:17

## 2022-04-25 RX ADMIN — SODIUM CHLORIDE 20 MILLILITER(S): 9 INJECTION INTRAMUSCULAR; INTRAVENOUS; SUBCUTANEOUS at 06:30

## 2022-04-25 RX ADMIN — Medication 8 MILLIGRAM(S): at 06:29

## 2022-04-25 RX ADMIN — SODIUM CHLORIDE 20 MILLILITER(S): 9 INJECTION INTRAMUSCULAR; INTRAVENOUS; SUBCUTANEOUS at 00:18

## 2022-04-25 RX ADMIN — APIXABAN 5 MILLIGRAM(S): 2.5 TABLET, FILM COATED ORAL at 01:42

## 2022-04-25 RX ADMIN — PANTOPRAZOLE SODIUM 40 MILLIGRAM(S): 20 TABLET, DELAYED RELEASE ORAL at 09:55

## 2022-04-25 RX ADMIN — SENNA PLUS 2 TABLET(S): 8.6 TABLET ORAL at 22:16

## 2022-04-25 RX ADMIN — Medication 150 MILLIGRAM(S): at 18:28

## 2022-04-25 RX ADMIN — TIOTROPIUM BROMIDE 1 CAPSULE(S): 18 CAPSULE ORAL; RESPIRATORY (INHALATION) at 13:26

## 2022-04-25 RX ADMIN — Medication 150 MILLIGRAM(S): at 06:29

## 2022-04-25 NOTE — CONSULT NOTE ADULT - TIME BILLING
review of records/results, pulmonary evaluation and management, and discussion with patient and medical team

## 2022-04-25 NOTE — CONSULT NOTE ADULT - CONSULT REASON
Severe Persistent Asthma and Tracheobronchoamalacia s/p tracheoplasty    (Evaluation on behalf of Dr. Eulalio Allison)
Dysphagia

## 2022-04-25 NOTE — ED ADULT NURSE REASSESSMENT NOTE - NS ED NURSE REASSESS COMMENT FT1
IVF infusing, medication administered. Pending admission bed, resting comfortably, call bell in reach, aware of plan of care.

## 2022-04-25 NOTE — CONSULT NOTE ADULT - ATTENDING COMMENTS
Patient seen and examined. Agree with above. 72 yo w hx of tracheobronchomalasia s/p tracheobronchoplasty, hx of Candidiasis presenting with 1 day of dysphagia. Patient now able to swallow secretions and liquids. Patient report globus sensation. Agree with non-urgent upper endoscopy for further evaluation.

## 2022-04-25 NOTE — SWALLOW BEDSIDE ASSESSMENT ADULT - H & P REVIEW
71 F w/  tracheobronchomalasia s/p tracheobronchoplasty, bronchiectasis, severe persistent asthma (steroid dependent), adrenal insuffiencey on chronic steroids, DM2, HTN, colorectal cancer s/p total colectomy now with colostomy, PAF on Eliquis,  recent hospitalizations for pneumonia, presents from  rehab ([facility] in [place])  for dysphagia  x 1 day/yes

## 2022-04-25 NOTE — PROVIDER CONTACT NOTE (OTHER) - ASSESSMENT
Pt is AOx4; NPO for tomorrow's endoscopy; blood sugar 178; refusing insulin because she is not eating.

## 2022-04-25 NOTE — CONSULT NOTE ADULT - SUBJECTIVE AND OBJECTIVE BOX
Chief Complaint:  Patient is a 71y old  Female who presents with a chief complaint of dysphagia x 1 day (24 Apr 2022 23:52)      HPI:  Ms. Stanton is a 70yo F with PMH of tracheobronchomalasia s/p tracheobronchoplasty, bronchiectasis, severe persistent asthma (steroid dependent), adrenal insuffiencey on chronic steroids, DM2, HTN, colorectal cancer s/p total colectomy now with colostomy, PAF on Eliquis who presents with sensation of food getting stuck for 1d.    Patient was recently hospitalized for pneumonia and bronchiectasis exacerbation requiring intubation, recently discharged to rehab ([facility] in [place]) on 4/7. Hospital course notable for dysphonia, laryngoscopy with thrush - completed 10d of diflucan with resolution of thrush. S&S eval with mild-moderate dorothea-pharyngeal dysphagia and was recommended puree diet and thin liquids.    Patient was doing well until the day of admission. She reports eating a meal of rice, chicken and snapper when she had a sensation of food getting stuck. She drank broth to queta it down x 2 but subsequently vomited the soup and bolus content. Following that, she reports resolution of her symptoms with only mild globus sensation remaining. She is able to tolerate secretions and was taking her medications with water this AM with no difficulty. No similar episodes in the past. No fever, chills, SOB. No nausea or vomiting. No abdominal pain. No change in bowel habits. Most recent documented endoscopy 2017 with small esophageal plaques (pathology was normal), otherwise normal.    In the ED: VSS, CXR clear.     Allergies:  aspirin (Short breath)  Avelox (Short breath; Pruritus)  codeine (Short breath)  Dilaudid (Short breath)  iodine (Short breath; Swelling)  penicillin (Unknown)  shellfish (Anaphylaxis)  tetanus toxoid (Short breath)  Valium (Short breath)      Home Medications:  acetaminophen 325 mg oral tablet: 3 tab(s) orally every 6 hours, As needed, Temp greater or equal to 38C (100.4F), Mild Pain (1 - 3) (07 Apr 2022 12:44)  Admelog 100 units/mL injectable solution: 9 unit(s) injectable 3 times a day (07 Apr 2022 12:54)  aluminum hydroxide-magnesium hydroxide 200 mg-200 mg/5 mL oral suspension: 30 milliliter(s) orally every 4 hours, As needed, Dyspepsia (07 Apr 2022 12:44)  apixaban 5 mg oral tablet: 1 tab(s) orally every 12 hours (07 Apr 2022 12:44)  ascorbic acid 500 mg oral tablet: 1 tab(s) orally once a day (07 Apr 2022 12:44)  chlorhexidine 2% topical pad: Apply topically to affected area once a day (07 Apr 2022 12:54)  Crestor 5 mg oral tablet: 1 tab(s) orally once a day (at bedtime) (28 Mar 2022 20:19)  insulin glargine 100 units/mL subcutaneous solution: 20 unit(s) subcutaneous once a day (at bedtime) (07 Apr 2022 12:44)  insulin lispro 100 units/mL injectable solution: 0 Unit(s) if Glucose 0 - 250  1 Unit(s) if Glucose 251 - 300  2 Unit(s) if Glucose 301 - 350  3 Unit(s) if Glucose 351 - 400  4 Unit(s) if Glucose Greater Than 400  Subcutaneous at bedtime (07 Apr 2022 12:54)  ipratropium-albuterol 0.5 mg-2.5 mg/3 mL inhalation solution: 3 milliliter(s) inhaled every 6 hours (07 Apr 2022 12:44)  lactulose 10 g/15 mL oral syrup: 22.5 milliliter(s) orally once a day (07 Apr 2022 12:44)  methylPREDNISolone 8 mg oral tablet: 1 tab(s) orally once a day (07 Apr 2022 12:44)  mexiletine 200 mg oral capsule: 1 cap(s) orally 3 times a day (07 Apr 2022 12:44)  Multiple Vitamins oral tablet: 1 tab(s) orally once a day (07 Apr 2022 12:44)  ondansetron 2 mg/mL injectable solution: 2 milliliter(s) injectable every 8 hours, As Needed (07 Apr 2022 12:46)  pantoprazole 40 mg oral delayed release tablet: 1 tab(s) orally once a day (before a meal) (07 Apr 2022 12:44)  polyethylene glycol 3350 oral powder for reconstitution: 17 gram(s) orally 2 times a day (07 Apr 2022 12:44)  pregabalin 150 mg oral capsule: 1 cap(s) orally 2 times a day (07 Apr 2022 12:44)  senna oral tablet: 2 tab(s) orally once a day (at bedtime) (07 Apr 2022 12:44)  sodium chloride 7% inhalation solution: 4 milliliter(s) inhaled every 12 hours (07 Apr 2022 12:44)  Spiriva 18 mcg inhalation capsule: 1 cap(s) inhaled once a day (28 Mar 2022 20:19)  zinc sulfate 220 mg oral capsule: 1 cap(s) orally once a day (07 Apr 2022 12:44)    Hospital Medications:  acetaminophen     Tablet .. 650 milliGRAM(s) Oral every 6 hours PRN  albuterol/ipratropium for Nebulization 3 milliLiter(s) Nebulizer every 6 hours PRN  aluminum hydroxide/magnesium hydroxide/simethicone Suspension 30 milliLiter(s) Oral every 4 hours PRN  apixaban 5 milliGRAM(s) Oral every 12 hours  atorvastatin 20 milliGRAM(s) Oral at bedtime  dextrose 5%. 1000 milliLiter(s) IV Continuous <Continuous>  dextrose 5%. 1000 milliLiter(s) IV Continuous <Continuous>  dextrose 50% Injectable 25 Gram(s) IV Push once  dextrose 50% Injectable 12.5 Gram(s) IV Push once  dextrose Oral Gel 15 Gram(s) Oral once PRN  glucagon  Injectable 1 milliGRAM(s) IntraMuscular once  insulin glargine Injectable (LANTUS) 10 Unit(s) SubCutaneous at bedtime  insulin lispro (ADMELOG) corrective regimen sliding scale   SubCutaneous three times a day before meals  insulin lispro (ADMELOG) corrective regimen sliding scale   SubCutaneous at bedtime  lactulose Syrup 15 Gram(s) Oral daily  methylPREDNISolone 8 milliGRAM(s) Oral daily  mexiletine 200 milliGRAM(s) Oral three times a day  ondansetron Injectable 2 milliGRAM(s) IV Push once  pantoprazole    Tablet 40 milliGRAM(s) Oral before breakfast  polyethylene glycol 3350 17 Gram(s) Oral two times a day  pregabalin 150 milliGRAM(s) Oral two times a day  senna 2 Tablet(s) Oral at bedtime  sodium chloride 0.9%. 1000 milliLiter(s) IV Continuous <Continuous>  tiotropium 18 MICROgram(s) Capsule 1 Capsule(s) Inhalation daily      PMHX/PSHX:  Atrial fibrillation    Diabetes    Hypertension    COPD (chronic obstructive pulmonary disease)    Adrenal insufficiency    Aortic insufficiency    Pelvic fracture    Asthma    Hypertension    Tracheobronchomalacia    Colorectal cancer    Aortic disease    Rectal bleeding    Seizure    DVT (deep venous thrombosis)    TIA (transient ischemic attack)    History of partial hysterectomy    H/O total knee replacement, bilateral    History of sinus surgery    Exostosis of orbit, left    H/O pelvic surgery    History of surgery    History of tracheomalacia    S/P bronchoscopy    Rectal bleeding        Family history:  Family history of asthma    Family history of breast cancer (Sibling)    Family history of diabetes mellitus type II        Denies family history of colon cancer/polyps, stomach cancer/polyps, pancreatic cancer/masses, liver cancer/disease, ovarian cancer and endometrial cancer.    Social History:     Tob: Denies  EtOH: As above  Illicit Drugs: Denies    ROS:   General:  No wt loss, fevers, chills, night sweats, fatigue  Eyes:  Good vision, no reported pain  ENT:  No sore throat, pain, runny nose, dysphagia  CV:  No pain, palpitations, hypo/hypertension  Pulm:  No dyspnea, cough, tachypnea, wheezing  GI:  As per HPI  :  No pain, bleeding, incontinence, nocturia  Muscle:  No pain, weakness  Neuro:  No weakness, tingling, memory problems  Psych:  No fatigue, insomnia, mood problems, depression  Endocrine:  No polyuria, polydipsia, cold/heat intolerance  Heme:  No petechiae, ecchymosis, easy bruisability  Skin:  No rash, tattoos, scars, edema    PHYSICAL EXAM:   GENERAL:  No acute distress  HEENT:  Normocephalic/atraumatic, no scleral icterus  CHEST:  No accessory muscle use  HEART:  Regular rate and rhythm  ABDOMEN:  Soft, non-tender, non-distended, left sided colostomy with empty bag  EXTREMITIES: No cyanosis, clubbing, or edema  SKIN:  No rash  NEURO:  Alert and oriented x 3, no asterixis    Vital Signs:  Vital Signs Last 24 Hrs  T(C): 36.8 (25 Apr 2022 07:35), Max: 36.9 (24 Apr 2022 18:13)  T(F): 98.2 (25 Apr 2022 07:35), Max: 98.4 (24 Apr 2022 18:13)  HR: 69 (25 Apr 2022 07:35) (67 - 94)  BP: 150/62 (25 Apr 2022 07:35) (110/71 - 164/98)  BP(mean): 78 (24 Apr 2022 18:47) (78 - 78)  RR: 18 (25 Apr 2022 07:35) (15 - 18)  SpO2: 95% (25 Apr 2022 07:35) (94% - 100%)  Daily Height in cm: 167.64 (24 Apr 2022 18:13)    Daily     LABS:                        9.7    10.09 )-----------( 244      ( 24 Apr 2022 23:38 )             33.3     Mean Cell Volume: 78.5 fl (04-24-22 @ 23:38)    04-24    144  |  105  |  11  ----------------------------<  92  3.8   |  26  |  0.39<L>    Ca    8.4      24 Apr 2022 23:38    TPro  5.2<L>  /  Alb  2.7<L>  /  TBili  0.2  /  DBili  x   /  AST  16  /  ALT  15  /  AlkPhos  64  04-24    LIVER FUNCTIONS - ( 24 Apr 2022 23:38 )  Alb: 2.7 g/dL / Pro: 5.2 g/dL / ALK PHOS: 64 U/L / ALT: 15 U/L / AST: 16 U/L / GGT: x           PT/INR - ( 24 Apr 2022 23:38 )   PT: 13.9 sec;   INR: 1.21 ratio         PTT - ( 24 Apr 2022 23:38 )  PTT:30.1 sec                            9.7    10.09 )-----------( 244      ( 24 Apr 2022 23:38 )             33.3       Imaging:  Clear CXR.

## 2022-04-25 NOTE — CONSULT NOTE ADULT - ASSESSMENT
72yo F with PMH of tracheobronchomalasia s/p tracheobronchoplasty, bronchiectasis, severe persistent asthma (steroid dependent), adrenal insuffiencey on chronic steroids, DM2, HTN, colorectal cancer s/p total colectomy now with colostomy, PAF on Eliquis who presents with sensation of food getting stuck for 1d.    # Food impaction - resolved. Now with mild residual globus sensation and tolerating liquids. No previous episodes. DDx includes candidiasis (given recent history), EoE, mechanical obstruction with known tracheomalacia (external compression), malignancy etc. Patient will benefit from a non-urgent endoscopy.  # Anemia - new downtrending Hb with no overt bleeding. Likely has underlying BERNADETTE.  # Colorectal cancer s/p total colectomy   # PAF on eliquis  # Asthma    Recommendations:  - NPO  - Plan for EGD (timing pending suite availability)  - c/w eilquis  - Consider anemia workup    Plan not finalized until attested by attending.    Thank you for involving us in the care of this patient. Please reach out if any further questions.    Antoni Wilson, PGY-5  GI Fellow    Available on [employer] Teams  After 5PM/Weekends, please contact the on-call GI fellow: [phone]

## 2022-04-25 NOTE — ED ADULT NURSE REASSESSMENT NOTE - NS ED NURSE REASSESS COMMENT FT1
Pt sleeping and admitted to medicine for food impaction. Report given to RADHA Vallejo in ED [facility].

## 2022-04-25 NOTE — PROGRESS NOTE ADULT - PROBLEM SELECTOR PLAN 1
low suspicion for food impaction , history c/w dysphagia , recently upgraded from pureed , will keep NPO except medications for now until EGD  - speech and swallow evaluation   - GI consult done  - NPO except medications , if unable to tolerate crushed meds in apple sauce , can reformulate cardiac medications to IV if available

## 2022-04-25 NOTE — CONSULT NOTE ADULT - SUBJECTIVE AND OBJECTIVE BOX
EVALUATION ON BEHALF OF DR. 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 She has had multiple positive past sputum cultures and most recently with pseudomonas.    She denies chills or urin symptoms. She has dysphagia. She has a chronic cough which is productive of green sputum. She feels that her respiratory status is a at baseline and denies complaints. She presents with the feeling of food being stuck in her throat after eating salmon and brocolli. She followed that up with soup and upon drinking the soup immediately started vomiting a few times which caused her to come to the hospital    She has had asthma since age 12. On her last hospitalization she was treated with Symbicort, Spiriva, and Duonebs Q6. She previously received Xolair as an outpatient and now has been on Dupixent. She has been on Mucomyst, Performist, Budesonide, Breo-Ellipta, and Accolate at varying times as an outpatient as well. She has a chronic cough and feels tight when she does not use her airway clearance therapies. She has a chest vest but does not use it presently. She denies sick contacts but was recently in a hospital in [facility] 3/2022 for pneumonia and required intubation and then hospitalized at [facility] on 3/28/22 and at that time was noted to have thrush as well as bacterial pneumonia for which she was treated with IV antibiotics    She denies chest pain or palpitations. She denies nausea or vomiting. She is passing gas.      ====================BACKGROUND INFORMATION============================    FAMILY HISTORY: FAMILY HISTORY:  Family history of asthma  Family history of breast cancer (Sibling)  Family history of diabetes mellitus type II        PAST MEDICAL AND SURGICAL HISTORY: PAST MEDICAL & SURGICAL HISTORY:  Atrial fibrillation  paroxysmal, on eliquis  Diabetes  Type 2  COPD (chronic obstructive pulmonary disease)  Adrenal insufficiency  Medrol daily for over 50 years  Aortic insufficiency  moderate AR on echo 5/3/2018  Pelvic fracture  Asthma  Tracheobronchomalacia  diagnosed 2015, s/p bronchial thermoplasty 2016 (Dr Zapien); recent bronchoscopy 6/5/2018 revealed no evidence of tracheobronchomalacia in trachea or bronchial tubes  Colorectal cancer  4/2018- last treatment , chemo and radiation  Rectal bleeding  Seizure  x 1 1/7/18  DVT (deep venous thrombosis)  15-20 years ago, took coumadin  TIA (transient ischemic attack)  multiple, last 5 years ago - presents as right-sided weakness  History of partial hysterectomy  30 years ago - fibroids  H/O total knee replacement, bilateral  5 years ago  History of sinus surgery  multiple sinus surgeries  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  H/O pelvic surgery  5 years ago - s/p fracture  History of tracheomalacia  2015 - attempted tracheal stenting ([facility])- course complicated by obstruction, respiratory failure, multiple CPR attempts -  stent discontinued; 10/20/2016 Tracheobronchoplasty (Prolene Mesh) performed at [facility] by Dr Zapien  S/P bronchoscopy  6/5/2018 - [place] (Dr Zapien) no evidence of tracheobronchomalacia in trachea or bronchial tubes  Rectal bleeding  exam under anesthesia ([facility]) 2/2018      ALLERGIES :Allergies  aspirin (Short breath)  Avelox (Short breath; Pruritus)  codeine (Short breath)  Dilaudid (Short breath)  iodine (Short breath; Swelling)  penicillin (Unknown)  shellfish (Anaphylaxis)  tetanus toxoid (Short breath)  Valium (Short breath)      HOME MEDICATIONS: Reviewed     OUTPATIENT PULMONARY DOCTOR: Otoniel Spring MD    SOCIAL HISTORY  TOBACCO USE: denied   ALCOHOL: denied   DRUGS: denied   MARITAL STATUS:    EMPLOYMENT:    EXPOSURES: none   RECENT TRAVELS: none   CODE STATUS: full code     ====================REVIEW OF SYSTEMS=====================================  CONSTITUTIONAL: Feeling better, memory worse  CARDIOVASCULAR: Denies chest pain or palpitations   PULMONARY: Chronic cough, denies hemoptysis, denies sputum production  GASTROINTESTINAL: Nausea, vomiting, sensation of food being stuck  [x] ALL OTHER REVIEW OF SYSTEMS ARE NEGATIVE   [] UNABLE TO OBTAIN REVIEW OF SYSTEMS DUE TO ______________      ====================PHYSICAL EXAM=========================================    VITALS: ICU Vital Signs Last 24 Hrs  T(C): 36.9 (25 Apr 2022 13:57), Max: 36.9 (24 Apr 2022 18:13)  T(F): 98.5 (25 Apr 2022 13:57), Max: 98.5 (25 Apr 2022 13:57)  HR: 73 (25 Apr 2022 13:57) (67 - 94)  BP: 116/58 (25 Apr 2022 13:57) (110/71 - 164/98)  BP(mean): 78 (24 Apr 2022 18:47) (78 - 78)  RR: 18 (25 Apr 2022 13:57) (15 - 18)  SpO2: 98% (25 Apr 2022 13:57) (94% - 100%)      INTAKE and OUTPUT: I&O's Summary    25 Apr 2022 07:01  -  25 Apr 2022 15:48  --------------------------------------------------------  IN: 0 mL / OUT: 350 mL / NET: -350 mL        WEIGHT: Daily Height in cm: 176 (25 Apr 2022 10:49)    Daily     GLUCOSE: CAPILLARY BLOOD GLUCOSE  POCT Blood Glucose.: 178 mg/dL (25 Apr 2022 14:03)      VENTILATOR SETTINGS: N/A    GENERAL: awake, interactive, NAD  HEENT: NCAT, EOMI, anicteric, MMM, nares clear, trachea midline  NECK:  supple, no JVD  LYMPH NODES: no palpable supraclavicular LAD  CARDIOVASCULAR: irreg irreg, S1S2  PULMONARY: good air entry, no accessory muscle use, coarse BS at base, no wheeze  ABDOMEN: soft, NT, + colostomy  SKIN: warm and dry   EXTREMITIES: no clubbing or cyanosis   NEUROLOGIC: nonfocal exam  PSYCHIATRIC: calm and in good spirits    ====================MEDICATIONS===========================================  MEDICATIONS  (STANDING):  atorvastatin 20 milliGRAM(s) Oral at bedtime  dextrose 5%. 1000 milliLiter(s) (50 mL/Hr) IV Continuous <Continuous>  dextrose 5%. 1000 milliLiter(s) (100 mL/Hr) IV Continuous <Continuous>  dextrose 50% Injectable 25 Gram(s) IV Push once  dextrose 50% Injectable 12.5 Gram(s) IV Push once  glucagon  Injectable 1 milliGRAM(s) IntraMuscular once  insulin glargine Injectable (LANTUS) 10 Unit(s) SubCutaneous at bedtime  insulin lispro (ADMELOG) corrective regimen sliding scale   SubCutaneous three times a day before meals  insulin lispro (ADMELOG) corrective regimen sliding scale   SubCutaneous at bedtime  lactulose Syrup 15 Gram(s) Oral daily  methylPREDNISolone 8 milliGRAM(s) Oral daily  mexiletine 200 milliGRAM(s) Oral three times a day  ondansetron Injectable 2 milliGRAM(s) IV Push once  pantoprazole    Tablet 40 milliGRAM(s) Oral before breakfast  polyethylene glycol 3350 17 Gram(s) Oral two times a day  pregabalin 150 milliGRAM(s) Oral two times a day  senna 2 Tablet(s) Oral at bedtime  sodium chloride 0.9%. 1000 milliLiter(s) (20 mL/Hr) IV Continuous <Continuous>  tiotropium 18 MICROgram(s) Capsule 1 Capsule(s) Inhalation daily      MEDICATIONS  (PRN):  acetaminophen     Tablet .. 650 milliGRAM(s) Oral every 6 hours PRN Temp greater or equal to 38C (100.4F), Mild Pain (1 - 3)  albuterol/ipratropium for Nebulization 3 milliLiter(s) Nebulizer every 6 hours PRN Shortness of Breath and/or Wheezing  aluminum hydroxide/magnesium hydroxide/simethicone Suspension 30 milliLiter(s) Oral every 4 hours PRN Dyspepsia  dextrose Oral Gel 15 Gram(s) Oral once PRN Blood Glucose LESS THAN 70 milliGRAM(s)/deciliter      ====================LABORATORY RESULTS====================================  CBC Full  -  ( 25 Apr 2022 11:16 )  WBC Count : 11.34 K/uL  RBC Count : 4.38 M/uL  Hemoglobin : 10.0 g/dL  Hematocrit : 34.1 %  Platelet Count - Automated : 260 K/uL  Mean Cell Volume : 77.9 fl  Mean Cell Hemoglobin : 22.8 pg  Mean Cell Hemoglobin Concentration : 29.3 gm/dL                                      04-25    141  |  106  |  10  ----------------------------<  172<H>  3.8   |  25  |  0.34<L>    Ca    8.1<L>      25 Apr 2022 11:16    TPro  5.2<L>  /  Alb  2.7<L>  /  TBili  0.2  /  DBili  x   /  AST  16  /  ALT  15  /  AlkPhos  64  04-24        PT/INR - ( 24 Apr 2022 23:38 )   PT: 13.9 sec;   INR: 1.21 ratio         PTT - ( 24 Apr 2022 23:38 )  PTT:30.1 sec    Creatinine Trend: 0.34<--, 0.39<--, 0.51<--, 0.41<--, 0.39<--, 0.40<--      ====================RADIOLOGY and ECHOCARDIOGRAPHY=======================    CXR:< from: Xray Chest 1 View- PORTABLE-Urgent (Xray Chest 1 View- PORTABLE-Urgent .) (04.24.22 @ 21:58) >  IMPRESSION:  Continued patchy right basilar opacity. This can be related to   subsegmental atelectasis, mucoid impaction, and/or infection. Please see   report of CT scan of the chest for further detail.    No radiopaque foreign body.    < end of copied text >      CT CHEST:  < from: CT Chest No Cont (03.28.22 @ 21:49) >  IMPRESSION:  Compared to 2/9/2022 chest CT, worsening extensive bronchial impaction   and tree-in-bud nodularity with new right lower lobe consolidation on a   background of bronchiectasis.    < end of copied text >    ECHOCARDIOGRAPHY: < from: Transthoracic Echocardiogram (11.22.21 @ 12:21) >  CONCLUSIONS:  1. Mitral annular calcification, otherwise normal mitral  valve. Minimal mitral regurgitation.  2. Calcified trileaflet aortic valve with normal opening.  Moderate-severe aortic regurgitation.  3. Mild left atrial enlargement.  4. Normal left ventricular internal dimensions and wall  thicknesses.  5. Endocardium not well visualized; grossly normal left  ventricular systolic function.  6. Mild diastolic dysfunction (Stage I).  7. The right ventricle is not well visualized; grossly  normal right ventricular systolic function.  8. Normal tricuspid valve. Minimal tricuspid regurgitation.  9. Estimated pulmonary artery systolic pressure equals 26  mm Hg, assuming right atrial pressure equals 10  mm Hg,  consistent with normal pulmonary pressures.  *** Compared with echocardiogram of 4/23/2021, no  significant changes noted.    < end of copied text >   EVALUATION ON BEHALF OF DR. 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 She has had multiple positive past sputum cultures and most recently with pseudomonas.    She denies chills or urin symptoms. She has dysphagia. She has a chronic cough which is productive of green sputum. She feels that her respiratory status is a at baseline and denies complaints. She presents with the feeling of food being stuck in her throat after eating salmon and brocolli. She followed that up with soup and upon drinking the soup immediately started vomiting a few times which caused her to come to the hospital    She has had asthma since age 12. On prior hospitalization she was treated with Symbicort, Spiriva, and Duonebs Q6. She previously received Xolair as an outpatient and now has been on Dupixent. She has been on Mucomyst, Performist, Budesonide, Breo-Ellipta, and Accolate at varying times as an outpatient as well. She has a chronic cough and feels tight when she does not use her airway clearance therapies. She has a chest vest but does not use it presently. She denies sick contacts but was recently in a hospital in [facility] 3/2022 for pneumonia and required intubation and then hospitalized at [facility] on 3/28/22 and at that time was noted to have thrush as well as bacterial pneumonia for which she was treated with IV antibiotics    She denies chest pain or palpitations. She denies nausea or vomiting. She is passing gas.      ====================BACKGROUND INFORMATION============================    FAMILY HISTORY: FAMILY HISTORY:  Family history of asthma  Family history of breast cancer (Sibling)  Family history of diabetes mellitus type II        PAST MEDICAL AND SURGICAL HISTORY: PAST MEDICAL & SURGICAL HISTORY:  Atrial fibrillation  paroxysmal, on eliquis  Diabetes  Type 2  COPD (chronic obstructive pulmonary disease)  Adrenal insufficiency  Medrol daily for over 50 years  Aortic insufficiency  moderate AR on echo 5/3/2018  Pelvic fracture  Asthma  Tracheobronchomalacia  diagnosed 2015, s/p bronchial thermoplasty 2016 (Dr Zapien); recent bronchoscopy 6/5/2018 revealed no evidence of tracheobronchomalacia in trachea or bronchial tubes  Colorectal cancer  4/2018- last treatment , chemo and radiation  Rectal bleeding  Seizure  x 1 1/7/18  DVT (deep venous thrombosis)  15-20 years ago, took coumadin  TIA (transient ischemic attack)  multiple, last 5 years ago - presents as right-sided weakness  History of partial hysterectomy  30 years ago - fibroids  H/O total knee replacement, bilateral  5 years ago  History of sinus surgery  multiple sinus surgeries  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  H/O pelvic surgery  5 years ago - s/p fracture  History of tracheomalacia  2015 - attempted tracheal stenting ([facility])- course complicated by obstruction, respiratory failure, multiple CPR attempts -  stent discontinued; 10/20/2016 Tracheobronchoplasty (Prolene Mesh) performed at [facility] by Dr Zapien  S/P bronchoscopy  6/5/2018 - [place] (Dr Zapien) no evidence of tracheobronchomalacia in trachea or bronchial tubes  Rectal bleeding  exam under anesthesia ([facility]) 2/2018      ALLERGIES :Allergies  aspirin (Short breath)  Avelox (Short breath; Pruritus)  codeine (Short breath)  Dilaudid (Short breath)  iodine (Short breath; Swelling)  penicillin (Unknown)  shellfish (Anaphylaxis)  tetanus toxoid (Short breath)  Valium (Short breath)      HOME MEDICATIONS: Reviewed     OUTPATIENT PULMONARY DOCTOR: Otoniel Spring MD    SOCIAL HISTORY  TOBACCO USE: denied   ALCOHOL: denied   DRUGS: denied   MARITAL STATUS:    EMPLOYMENT:    EXPOSURES: none   RECENT TRAVELS: none   CODE STATUS: full code     ====================REVIEW OF SYSTEMS=====================================  CONSTITUTIONAL: Feeling better, memory worse  CARDIOVASCULAR: Denies chest pain or palpitations   PULMONARY: Chronic cough, denies hemoptysis, denies sputum production  GASTROINTESTINAL: Nausea, vomiting, sensation of food being stuck  [x] ALL OTHER REVIEW OF SYSTEMS ARE NEGATIVE   [] UNABLE TO OBTAIN REVIEW OF SYSTEMS DUE TO ______________      ====================PHYSICAL EXAM=========================================    VITALS: ICU Vital Signs Last 24 Hrs  T(C): 36.9 (25 Apr 2022 13:57), Max: 36.9 (24 Apr 2022 18:13)  T(F): 98.5 (25 Apr 2022 13:57), Max: 98.5 (25 Apr 2022 13:57)  HR: 73 (25 Apr 2022 13:57) (67 - 94)  BP: 116/58 (25 Apr 2022 13:57) (110/71 - 164/98)  BP(mean): 78 (24 Apr 2022 18:47) (78 - 78)  RR: 18 (25 Apr 2022 13:57) (15 - 18)  SpO2: 98% (25 Apr 2022 13:57) (94% - 100%)      INTAKE and OUTPUT: I&O's Summary    25 Apr 2022 07:01  -  25 Apr 2022 15:48  --------------------------------------------------------  IN: 0 mL / OUT: 350 mL / NET: -350 mL        WEIGHT: Daily Height in cm: 176 (25 Apr 2022 10:49)    Daily     GLUCOSE: CAPILLARY BLOOD GLUCOSE  POCT Blood Glucose.: 178 mg/dL (25 Apr 2022 14:03)      VENTILATOR SETTINGS: N/A    GENERAL: awake, interactive, NAD  HEENT: NCAT, EOMI, anicteric, MMM, nares clear, trachea midline  NECK:  supple, no JVD  LYMPH NODES: no palpable supraclavicular LAD  CARDIOVASCULAR: irreg irreg, S1S2  PULMONARY: good air entry, no accessory muscle use, coarse BS at base, no wheeze  ABDOMEN: soft, NT, + colostomy  SKIN: warm and dry   EXTREMITIES: no clubbing or cyanosis   NEUROLOGIC: nonfocal exam  PSYCHIATRIC: calm and in good spirits    ====================MEDICATIONS===========================================  MEDICATIONS  (STANDING):  atorvastatin 20 milliGRAM(s) Oral at bedtime  dextrose 5%. 1000 milliLiter(s) (50 mL/Hr) IV Continuous <Continuous>  dextrose 5%. 1000 milliLiter(s) (100 mL/Hr) IV Continuous <Continuous>  dextrose 50% Injectable 25 Gram(s) IV Push once  dextrose 50% Injectable 12.5 Gram(s) IV Push once  glucagon  Injectable 1 milliGRAM(s) IntraMuscular once  insulin glargine Injectable (LANTUS) 10 Unit(s) SubCutaneous at bedtime  insulin lispro (ADMELOG) corrective regimen sliding scale   SubCutaneous three times a day before meals  insulin lispro (ADMELOG) corrective regimen sliding scale   SubCutaneous at bedtime  lactulose Syrup 15 Gram(s) Oral daily  methylPREDNISolone 8 milliGRAM(s) Oral daily  mexiletine 200 milliGRAM(s) Oral three times a day  ondansetron Injectable 2 milliGRAM(s) IV Push once  pantoprazole    Tablet 40 milliGRAM(s) Oral before breakfast  polyethylene glycol 3350 17 Gram(s) Oral two times a day  pregabalin 150 milliGRAM(s) Oral two times a day  senna 2 Tablet(s) Oral at bedtime  sodium chloride 0.9%. 1000 milliLiter(s) (20 mL/Hr) IV Continuous <Continuous>  tiotropium 18 MICROgram(s) Capsule 1 Capsule(s) Inhalation daily      MEDICATIONS  (PRN):  acetaminophen     Tablet .. 650 milliGRAM(s) Oral every 6 hours PRN Temp greater or equal to 38C (100.4F), Mild Pain (1 - 3)  albuterol/ipratropium for Nebulization 3 milliLiter(s) Nebulizer every 6 hours PRN Shortness of Breath and/or Wheezing  aluminum hydroxide/magnesium hydroxide/simethicone Suspension 30 milliLiter(s) Oral every 4 hours PRN Dyspepsia  dextrose Oral Gel 15 Gram(s) Oral once PRN Blood Glucose LESS THAN 70 milliGRAM(s)/deciliter      ====================LABORATORY RESULTS====================================  CBC Full  -  ( 25 Apr 2022 11:16 )  WBC Count : 11.34 K/uL  RBC Count : 4.38 M/uL  Hemoglobin : 10.0 g/dL  Hematocrit : 34.1 %  Platelet Count - Automated : 260 K/uL  Mean Cell Volume : 77.9 fl  Mean Cell Hemoglobin : 22.8 pg  Mean Cell Hemoglobin Concentration : 29.3 gm/dL                                      04-25    141  |  106  |  10  ----------------------------<  172<H>  3.8   |  25  |  0.34<L>    Ca    8.1<L>      25 Apr 2022 11:16    TPro  5.2<L>  /  Alb  2.7<L>  /  TBili  0.2  /  DBili  x   /  AST  16  /  ALT  15  /  AlkPhos  64  04-24        PT/INR - ( 24 Apr 2022 23:38 )   PT: 13.9 sec;   INR: 1.21 ratio         PTT - ( 24 Apr 2022 23:38 )  PTT:30.1 sec    Creatinine Trend: 0.34<--, 0.39<--, 0.51<--, 0.41<--, 0.39<--, 0.40<--      ====================RADIOLOGY and ECHOCARDIOGRAPHY=======================    CXR:< from: Xray Chest 1 View- PORTABLE-Urgent (Xray Chest 1 View- PORTABLE-Urgent .) (04.24.22 @ 21:58) >  IMPRESSION:  Continued patchy right basilar opacity. This can be related to   subsegmental atelectasis, mucoid impaction, and/or infection. Please see   report of CT scan of the chest for further detail.    No radiopaque foreign body.    < end of copied text >      CT CHEST:  < from: CT Chest No Cont (03.28.22 @ 21:49) >  IMPRESSION:  Compared to 2/9/2022 chest CT, worsening extensive bronchial impaction   and tree-in-bud nodularity with new right lower lobe consolidation on a   background of bronchiectasis.    < end of copied text >    ECHOCARDIOGRAPHY: < from: Transthoracic Echocardiogram (11.22.21 @ 12:21) >  CONCLUSIONS:  1. Mitral annular calcification, otherwise normal mitral  valve. Minimal mitral regurgitation.  2. Calcified trileaflet aortic valve with normal opening.  Moderate-severe aortic regurgitation.  3. Mild left atrial enlargement.  4. Normal left ventricular internal dimensions and wall  thicknesses.  5. Endocardium not well visualized; grossly normal left  ventricular systolic function.  6. Mild diastolic dysfunction (Stage I).  7. The right ventricle is not well visualized; grossly  normal right ventricular systolic function.  8. Normal tricuspid valve. Minimal tricuspid regurgitation.  9. Estimated pulmonary artery systolic pressure equals 26  mm Hg, assuming right atrial pressure equals 10  mm Hg,  consistent with normal pulmonary pressures.  *** Compared with echocardiogram of 4/23/2021, no  significant changes noted.    < end of copied text >

## 2022-04-25 NOTE — CONSULT NOTE ADULT - ASSESSMENT
71F extensive medical history including rectal adenocarcinoma s/p colectomy/colostomy, TBM s/p tracheobplasty, severe persistent asthma (Dupixent and steroids), bronchiectasis and chronic bronchitis, and dysphagia who presents with the sensation of food being stuck in her throat.    1. Dysphagia - sensation of food being stuck  - GI followup with plan for EGD  - Symptoms reportedly improved but patient states she had similar symptoms on prior visit 71F extensive medical history including rectal adenocarcinoma s/p colectomy/colostomy, TBM s/p tracheobplasty, severe persistent asthma (Dupixent and steroids), bronchiectasis and chronic bronchitis, and dysphagia who presents with the sensation of food being stuck in her throat.    1. Dysphagia - sensation of food being stuck  - GI followup with plan for EGD  - Symptoms reportedly improved but patient states she had similar symptoms on prior visit  - Aspiration precautions    2. Severe persistent asthma - steroid dependent (also with adrenal insufficiency)  - no acute respiratory distress  - maintain O2 sat > 88%  - incentive spirometer and acapella to facilitate breathing. If sputum develops may benefit from chest vest  - Continue Medrol at home dose  - Continue Dupixent as an outpatient  - Patient has been treated with multiple bronchodilators in the past: can use Symbicort, Spiriva, Accolate, and Duonebs while hospitalized  - outpatient followup with Dr. Allison    3. Tracheobronchomalacia s/p tracheoplasty in the past with Dr. Zapien at [facility]    4. Chronic bronchitis and chronic cough  - continue bronchodilator therapy  - would start airway clearance  - also a component of sensory neuropathic cough - on Amitryptiline    5. Infectious Disease   - recent hospitalizations at [facility] and then [facility] in March 2022 with bacterial pneumonia treated with antibiotics  - currently appears nontoxic appearing   - would check sputum culture and nasal MRSA  - currently on isolation for prior MRSA pneumonia/infection    6. GERD and dysphagia  - PPI  - GI followup    7. Rhinitis  - Can start Flonase    8. Cardiovascular - history of Afib with AC on hold  - Continue Mexilitene for prior PVCs  - Would suggest clarifying cardiac history    9. Prophylaxis  - DVT proph - AC currently on hold for EGD  - GI proph  - Aspiration precautions  - Maintain O2 sat > 88%  - Incentive spirometer and acapella

## 2022-04-26 ENCOUNTER — NON-APPOINTMENT (OUTPATIENT)
Age: 72
End: 2022-04-26

## 2022-04-26 ENCOUNTER — RESULT REVIEW (OUTPATIENT)
Age: 72
End: 2022-04-26

## 2022-04-26 LAB
ALBUMIN SERPL ELPH-MCNC: 2.9 G/DL — LOW (ref 3.3–5)
ALP SERPL-CCNC: 65 U/L — SIGNIFICANT CHANGE UP (ref 40–120)
ALT FLD-CCNC: 12 U/L — SIGNIFICANT CHANGE UP (ref 10–45)
ANION GAP SERPL CALC-SCNC: 15 MMOL/L — SIGNIFICANT CHANGE UP (ref 5–17)
AST SERPL-CCNC: 12 U/L — SIGNIFICANT CHANGE UP (ref 10–40)
BASOPHILS # BLD AUTO: 0.04 K/UL — SIGNIFICANT CHANGE UP (ref 0–0.2)
BASOPHILS NFR BLD AUTO: 0.5 % — SIGNIFICANT CHANGE UP (ref 0–2)
BILIRUB SERPL-MCNC: 0.2 MG/DL — SIGNIFICANT CHANGE UP (ref 0.2–1.2)
BUN SERPL-MCNC: 9 MG/DL — SIGNIFICANT CHANGE UP (ref 7–23)
CALCIUM SERPL-MCNC: 8.7 MG/DL — SIGNIFICANT CHANGE UP (ref 8.4–10.5)
CHLORIDE SERPL-SCNC: 104 MMOL/L — SIGNIFICANT CHANGE UP (ref 96–108)
CO2 SERPL-SCNC: 26 MMOL/L — SIGNIFICANT CHANGE UP (ref 22–31)
CREAT SERPL-MCNC: 0.4 MG/DL — LOW (ref 0.5–1.3)
EGFR: 104 ML/MIN/1.73M2 — SIGNIFICANT CHANGE UP
EOSINOPHIL # BLD AUTO: 0.17 K/UL — SIGNIFICANT CHANGE UP (ref 0–0.5)
EOSINOPHIL NFR BLD AUTO: 2.1 % — SIGNIFICANT CHANGE UP (ref 0–6)
GLUCOSE BLDC GLUCOMTR-MCNC: 107 MG/DL — HIGH (ref 70–99)
GLUCOSE BLDC GLUCOMTR-MCNC: 186 MG/DL — HIGH (ref 70–99)
GLUCOSE BLDC GLUCOMTR-MCNC: 238 MG/DL — HIGH (ref 70–99)
GLUCOSE BLDC GLUCOMTR-MCNC: 257 MG/DL — HIGH (ref 70–99)
GLUCOSE SERPL-MCNC: 89 MG/DL — SIGNIFICANT CHANGE UP (ref 70–99)
HCT VFR BLD CALC: 34.4 % — LOW (ref 34.5–45)
HGB BLD-MCNC: 10.2 G/DL — LOW (ref 11.5–15.5)
IMM GRANULOCYTES NFR BLD AUTO: 1.1 % — SIGNIFICANT CHANGE UP (ref 0–1.5)
LYMPHOCYTES # BLD AUTO: 1.95 K/UL — SIGNIFICANT CHANGE UP (ref 1–3.3)
LYMPHOCYTES # BLD AUTO: 24.7 % — SIGNIFICANT CHANGE UP (ref 13–44)
MCHC RBC-ENTMCNC: 23.2 PG — LOW (ref 27–34)
MCHC RBC-ENTMCNC: 29.7 GM/DL — LOW (ref 32–36)
MCV RBC AUTO: 78.2 FL — LOW (ref 80–100)
MONOCYTES # BLD AUTO: 0.84 K/UL — SIGNIFICANT CHANGE UP (ref 0–0.9)
MONOCYTES NFR BLD AUTO: 10.6 % — SIGNIFICANT CHANGE UP (ref 2–14)
NEUTROPHILS # BLD AUTO: 4.82 K/UL — SIGNIFICANT CHANGE UP (ref 1.8–7.4)
NEUTROPHILS NFR BLD AUTO: 61 % — SIGNIFICANT CHANGE UP (ref 43–77)
NRBC # BLD: 0 /100 WBCS — SIGNIFICANT CHANGE UP (ref 0–0)
PLATELET # BLD AUTO: 270 K/UL — SIGNIFICANT CHANGE UP (ref 150–400)
POTASSIUM SERPL-MCNC: 3.4 MMOL/L — LOW (ref 3.5–5.3)
POTASSIUM SERPL-SCNC: 3.4 MMOL/L — LOW (ref 3.5–5.3)
PROT SERPL-MCNC: 5.5 G/DL — LOW (ref 6–8.3)
RBC # BLD: 4.4 M/UL — SIGNIFICANT CHANGE UP (ref 3.8–5.2)
RBC # FLD: 21.4 % — HIGH (ref 10.3–14.5)
SODIUM SERPL-SCNC: 145 MMOL/L — SIGNIFICANT CHANGE UP (ref 135–145)
WBC # BLD: 7.91 K/UL — SIGNIFICANT CHANGE UP (ref 3.8–10.5)
WBC # FLD AUTO: 7.91 K/UL — SIGNIFICANT CHANGE UP (ref 3.8–10.5)

## 2022-04-26 PROCEDURE — 87449 NOS EACH ORGANISM AG IA: CPT

## 2022-04-26 PROCEDURE — 84295 ASSAY OF SERUM SODIUM: CPT

## 2022-04-26 PROCEDURE — 71250 CT THORAX DX C-: CPT

## 2022-04-26 PROCEDURE — 99232 SBSQ HOSP IP/OBS MODERATE 35: CPT

## 2022-04-26 PROCEDURE — 97110 THERAPEUTIC EXERCISES: CPT

## 2022-04-26 PROCEDURE — 84165 PROTEIN E-PHORESIS SERUM: CPT

## 2022-04-26 PROCEDURE — 97166 OT EVAL MOD COMPLEX 45 MIN: CPT

## 2022-04-26 PROCEDURE — 93005 ELECTROCARDIOGRAM TRACING: CPT

## 2022-04-26 PROCEDURE — 86606 ASPERGILLUS ANTIBODY: CPT

## 2022-04-26 PROCEDURE — 85014 HEMATOCRIT: CPT

## 2022-04-26 PROCEDURE — 80202 ASSAY OF VANCOMYCIN: CPT

## 2022-04-26 PROCEDURE — 83735 ASSAY OF MAGNESIUM: CPT

## 2022-04-26 PROCEDURE — 80048 BASIC METABOLIC PNL TOTAL CA: CPT

## 2022-04-26 PROCEDURE — 87641 MR-STAPH DNA AMP PROBE: CPT

## 2022-04-26 PROCEDURE — 87305 ASPERGILLUS AG IA: CPT

## 2022-04-26 PROCEDURE — U0005: CPT

## 2022-04-26 PROCEDURE — 87640 STAPH A DNA AMP PROBE: CPT

## 2022-04-26 PROCEDURE — 82330 ASSAY OF CALCIUM: CPT

## 2022-04-26 PROCEDURE — 43239 EGD BIOPSY SINGLE/MULTIPLE: CPT | Mod: GC

## 2022-04-26 PROCEDURE — 0225U NFCT DS DNA&RNA 21 SARSCOV2: CPT

## 2022-04-26 PROCEDURE — 84155 ASSAY OF PROTEIN SERUM: CPT

## 2022-04-26 PROCEDURE — 94640 AIRWAY INHALATION TREATMENT: CPT

## 2022-04-26 PROCEDURE — U0003: CPT

## 2022-04-26 PROCEDURE — 87040 BLOOD CULTURE FOR BACTERIA: CPT

## 2022-04-26 PROCEDURE — 99285 EMERGENCY DEPT VISIT HI MDM: CPT | Mod: 25

## 2022-04-26 PROCEDURE — 82803 BLOOD GASES ANY COMBINATION: CPT

## 2022-04-26 PROCEDURE — 99233 SBSQ HOSP IP/OBS HIGH 50: CPT

## 2022-04-26 PROCEDURE — 97162 PT EVAL MOD COMPLEX 30 MIN: CPT

## 2022-04-26 PROCEDURE — 84132 ASSAY OF SERUM POTASSIUM: CPT

## 2022-04-26 PROCEDURE — 85025 COMPLETE CBC W/AUTO DIFF WBC: CPT

## 2022-04-26 PROCEDURE — 36415 COLL VENOUS BLD VENIPUNCTURE: CPT

## 2022-04-26 PROCEDURE — 84439 ASSAY OF FREE THYROXINE: CPT

## 2022-04-26 PROCEDURE — 80061 LIPID PANEL: CPT

## 2022-04-26 PROCEDURE — 87070 CULTURE OTHR SPECIMN AEROBIC: CPT

## 2022-04-26 PROCEDURE — 86334 IMMUNOFIX E-PHORESIS SERUM: CPT

## 2022-04-26 PROCEDURE — 87186 SC STD MICRODIL/AGAR DIL: CPT

## 2022-04-26 PROCEDURE — 82435 ASSAY OF BLOOD CHLORIDE: CPT

## 2022-04-26 PROCEDURE — 83605 ASSAY OF LACTIC ACID: CPT

## 2022-04-26 PROCEDURE — 71045 X-RAY EXAM CHEST 1 VIEW: CPT

## 2022-04-26 PROCEDURE — 87077 CULTURE AEROBIC IDENTIFY: CPT

## 2022-04-26 PROCEDURE — 82962 GLUCOSE BLOOD TEST: CPT

## 2022-04-26 PROCEDURE — 96375 TX/PRO/DX INJ NEW DRUG ADDON: CPT

## 2022-04-26 PROCEDURE — 74018 RADEX ABDOMEN 1 VIEW: CPT

## 2022-04-26 PROCEDURE — 85027 COMPLETE CBC AUTOMATED: CPT

## 2022-04-26 PROCEDURE — 80053 COMPREHEN METABOLIC PANEL: CPT

## 2022-04-26 PROCEDURE — 84443 ASSAY THYROID STIM HORMONE: CPT

## 2022-04-26 PROCEDURE — 81001 URINALYSIS AUTO W/SCOPE: CPT

## 2022-04-26 PROCEDURE — 97530 THERAPEUTIC ACTIVITIES: CPT

## 2022-04-26 PROCEDURE — 88305 TISSUE EXAM BY PATHOLOGIST: CPT | Mod: 26

## 2022-04-26 PROCEDURE — 87150 DNA/RNA AMPLIFIED PROBE: CPT

## 2022-04-26 PROCEDURE — 84100 ASSAY OF PHOSPHORUS: CPT

## 2022-04-26 PROCEDURE — 83036 HEMOGLOBIN GLYCOSYLATED A1C: CPT

## 2022-04-26 PROCEDURE — 85018 HEMOGLOBIN: CPT

## 2022-04-26 PROCEDURE — 92610 EVALUATE SWALLOWING FUNCTION: CPT

## 2022-04-26 PROCEDURE — 82947 ASSAY GLUCOSE BLOOD QUANT: CPT

## 2022-04-26 PROCEDURE — 96374 THER/PROPH/DIAG INJ IV PUSH: CPT

## 2022-04-26 PROCEDURE — 87086 URINE CULTURE/COLONY COUNT: CPT

## 2022-04-26 RX ORDER — INSULIN LISPRO 100/ML
VIAL (ML) SUBCUTANEOUS
Refills: 0 | Status: DISCONTINUED | OUTPATIENT
Start: 2022-04-26 | End: 2022-04-28

## 2022-04-26 RX ORDER — POTASSIUM CHLORIDE 20 MEQ
40 PACKET (EA) ORAL ONCE
Refills: 0 | Status: COMPLETED | OUTPATIENT
Start: 2022-04-26 | End: 2022-04-26

## 2022-04-26 RX ORDER — INSULIN GLARGINE 100 [IU]/ML
10 INJECTION, SOLUTION SUBCUTANEOUS AT BEDTIME
Refills: 0 | Status: DISCONTINUED | OUTPATIENT
Start: 2022-04-26 | End: 2022-04-28

## 2022-04-26 RX ORDER — OXYCODONE AND ACETAMINOPHEN 5; 325 MG/1; MG/1
1 TABLET ORAL ONCE
Refills: 0 | Status: DISCONTINUED | OUTPATIENT
Start: 2022-04-26 | End: 2022-04-26

## 2022-04-26 RX ORDER — APIXABAN 2.5 MG/1
5 TABLET, FILM COATED ORAL EVERY 12 HOURS
Refills: 0 | Status: DISCONTINUED | OUTPATIENT
Start: 2022-04-26 | End: 2022-04-28

## 2022-04-26 RX ORDER — POTASSIUM CHLORIDE 20 MEQ
10 PACKET (EA) ORAL
Refills: 0 | Status: COMPLETED | OUTPATIENT
Start: 2022-04-26 | End: 2022-04-26

## 2022-04-26 RX ORDER — INSULIN LISPRO 100/ML
4 VIAL (ML) SUBCUTANEOUS
Refills: 0 | Status: DISCONTINUED | OUTPATIENT
Start: 2022-04-26 | End: 2022-04-28

## 2022-04-26 RX ORDER — SODIUM CHLORIDE 9 MG/ML
500 INJECTION INTRAMUSCULAR; INTRAVENOUS; SUBCUTANEOUS
Refills: 0 | Status: COMPLETED | OUTPATIENT
Start: 2022-04-26 | End: 2022-04-27

## 2022-04-26 RX ORDER — LIDOCAINE HCL 20 MG/ML
4 VIAL (ML) INJECTION ONCE
Refills: 0 | Status: DISCONTINUED | OUTPATIENT
Start: 2022-04-26 | End: 2022-04-28

## 2022-04-26 RX ORDER — INSULIN LISPRO 100/ML
VIAL (ML) SUBCUTANEOUS AT BEDTIME
Refills: 0 | Status: DISCONTINUED | OUTPATIENT
Start: 2022-04-26 | End: 2022-04-28

## 2022-04-26 RX ADMIN — OXYCODONE AND ACETAMINOPHEN 1 TABLET(S): 5; 325 TABLET ORAL at 23:36

## 2022-04-26 RX ADMIN — APIXABAN 5 MILLIGRAM(S): 2.5 TABLET, FILM COATED ORAL at 18:25

## 2022-04-26 RX ADMIN — POLYETHYLENE GLYCOL 3350 17 GRAM(S): 17 POWDER, FOR SOLUTION ORAL at 06:05

## 2022-04-26 RX ADMIN — Medication 100 MILLIEQUIVALENT(S): at 13:48

## 2022-04-26 RX ADMIN — INSULIN GLARGINE 10 UNIT(S): 100 INJECTION, SOLUTION SUBCUTANEOUS at 22:05

## 2022-04-26 RX ADMIN — Medication 100 MILLIEQUIVALENT(S): at 15:48

## 2022-04-26 RX ADMIN — POLYETHYLENE GLYCOL 3350 17 GRAM(S): 17 POWDER, FOR SOLUTION ORAL at 18:23

## 2022-04-26 RX ADMIN — Medication 150 MILLIGRAM(S): at 18:24

## 2022-04-26 RX ADMIN — Medication 8 MILLIGRAM(S): at 06:04

## 2022-04-26 RX ADMIN — Medication 150 MILLIGRAM(S): at 06:04

## 2022-04-26 RX ADMIN — SENNA PLUS 2 TABLET(S): 8.6 TABLET ORAL at 22:08

## 2022-04-26 RX ADMIN — TIOTROPIUM BROMIDE 1 CAPSULE(S): 18 CAPSULE ORAL; RESPIRATORY (INHALATION) at 12:25

## 2022-04-26 RX ADMIN — ATORVASTATIN CALCIUM 20 MILLIGRAM(S): 80 TABLET, FILM COATED ORAL at 22:09

## 2022-04-26 RX ADMIN — MEXILETINE HYDROCHLORIDE 200 MILLIGRAM(S): 150 CAPSULE ORAL at 22:12

## 2022-04-26 RX ADMIN — SODIUM CHLORIDE 20 MILLILITER(S): 9 INJECTION INTRAMUSCULAR; INTRAVENOUS; SUBCUTANEOUS at 22:05

## 2022-04-26 RX ADMIN — Medication 100 MILLIEQUIVALENT(S): at 12:22

## 2022-04-26 RX ADMIN — LACTULOSE 15 GRAM(S): 10 SOLUTION ORAL at 12:24

## 2022-04-26 RX ADMIN — MEXILETINE HYDROCHLORIDE 200 MILLIGRAM(S): 150 CAPSULE ORAL at 13:20

## 2022-04-26 RX ADMIN — OXYCODONE AND ACETAMINOPHEN 1 TABLET(S): 5; 325 TABLET ORAL at 23:06

## 2022-04-26 RX ADMIN — Medication 1 SPRAY(S): at 18:24

## 2022-04-26 RX ADMIN — Medication 40 MILLIEQUIVALENT(S): at 12:23

## 2022-04-26 RX ADMIN — PANTOPRAZOLE SODIUM 40 MILLIGRAM(S): 20 TABLET, DELAYED RELEASE ORAL at 06:04

## 2022-04-26 RX ADMIN — MEXILETINE HYDROCHLORIDE 200 MILLIGRAM(S): 150 CAPSULE ORAL at 06:04

## 2022-04-26 NOTE — PRE PROCEDURE NOTE - PRE PROCEDURE EVALUATION
Attending Physician:    Dr. Myers                        Procedure:   EGD    Indication for Procedure:   Prior Food Impaction  ________________________________________________________  PAST MEDICAL & SURGICAL HISTORY:    Atrial fibrillation-paroxysmal, on eliquis  Diabetes-Type 2  COPD (chronic obstructive pulmonary disease)  Adrenal insufficiency-Medrol daily for over 50 years  Aortic insufficiency-moderate on echo 5/3/2018  Pelvic fracture  Asthma  Tracheobronchomalacia-diagnosed 2015, s/p bronchial thermoplasty 2016 (Dr Zapien); recent bronchoscopy 6/5/2018 revealed no evidence of tracheobronchomalacia in trachea or bronchial tubes  Colorectal cancer-4/2018- last treatment , chemo and radiation  Rectal bleeding  Seizure x 1 1/7/18  DVT (deep venous thrombosis) 15-20 years ago, took coumadin  TIA (transient ischemic attack)-multiple, last 5 years ago - presents as right-sided weakness  History of tracheomalacia    History of partial hysterectomy 30 years ago - fibroids  H/O total knee replacement, bilateral 5 years ago  History of multiple sinus surgeries  Exostosis of orbit, 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)- course complicated by obstruction, respiratory failure, multiple CPR attempts -  stent discontinued; 10/20/2016 Tracheobronchoplasty (Prolene Mesh)- [place] by Dr Zapien  S/P bronchoscopy-6/5/2018 - [facility]az[facility] (Dr Zapien) no evidence of tracheobronchomalacia in trachea or bronchial tubes  Rectal bleeding-exam under anesthesia ([facility]) 2/2018      ALLERGIES:  aspirin (Short breath)  Avelox (Short breath; Pruritus)  codeine (Short breath)  Dilaudid (Short breath)  iodine (Short breath; Swelling)  penicillin (Unknown)  shellfish (Anaphylaxis)  tetanus toxoid (Short breath)  Valium (Short breath)    HOME MEDICATIONS:  acetaminophen 325 mg oral tablet: 3 tab(s) orally every 6 hours, As needed, Temp greater or equal to 38C (100.4F), Mild Pain (1 - 3)  Admelog 100 units/mL injectable solution: 9 unit(s) injectable 3 times a day  aluminum hydroxide-magnesium hydroxide 200 mg-200 mg/5 mL oral suspension: 30 milliliter(s) orally every 4 hours, As needed, Dyspepsia  apixaban 5 mg oral tablet: 1 tab(s) orally every 12 hours  ascorbic acid 500 mg oral tablet: 1 tab(s) orally once a day  chlorhexidine 2% topical pad: Apply topically to affected area once a day  Crestor 5 mg oral tablet: 1 tab(s) orally once a day (at bedtime)  insulin glargine 100 units/mL subcutaneous solution: 20 unit(s) subcutaneous once a day (at bedtime)  insulin lispro 100 units/mL injectable solution: 0 Unit(s) if Glucose 0 - 250  1 Unit(s) if Glucose 251 - 300  2 Unit(s) if Glucose 301 - 350  3 Unit(s) if Glucose 351 - 400  4 Unit(s) if Glucose Greater Than 400  Subcutaneous at bedtime  ipratropium-albuterol 0.5 mg-2.5 mg/3 mL inhalation solution: 3 milliliter(s) inhaled every 6 hours  lactulose 10 g/15 mL oral syrup: 22.5 milliliter(s) orally once a day  methylPREDNISolone 8 mg oral tablet: 1 tab(s) orally once a day  mexiletine 200 mg oral capsule: 1 cap(s) orally 3 times a day  Multiple Vitamins oral tablet: 1 tab(s) orally once a day  ondansetron 2 mg/mL injectable solution: 2 milliliter(s) injectable every 8 hours, As Needed  pantoprazole 40 mg oral delayed release tablet: 1 tab(s) orally once a day (before a meal)  polyethylene glycol 3350 oral powder for reconstitution: 17 gram(s) orally 2 times a day  pregabalin 150 mg oral capsule: 1 cap(s) orally 2 times a day  senna oral tablet: 2 tab(s) orally once a day (at bedtime)  sodium chloride 7% inhalation solution: 4 milliliter(s) inhaled every 12 hours  Spiriva 18 mcg inhalation capsule: 1 cap(s) inhaled once a day  zinc sulfate 220 mg oral capsule: 1 cap(s) orally once a day    AICD/PPM: [ ] yes   [X ] no    PERTINENT LAB DATA:                        10.2   7.91  )-----------( 270      ( 26 Apr 2022 07:47 )             34.4     04-26    145  |  104  |  9   ----------------------------<  89  3.4<L>   |  26  |  0.40<L>    Ca    8.7      26 Apr 2022 07:47  TPro  5.5<L>  /  Alb  2.9<L>  /  TBili  0.2  /  DBili  x   /  AST  12  /  ALT  12  /  AlkPhos  65  04-26  PT/INR - ( 24 Apr 2022 23:38 )   PT: 13.9 sec;   INR: 1.21 ratio    PTT - ( 24 Apr 2022 23:38 )  PTT:30.1 sec    PHYSICAL EXAMINATION:    Height (cm): 176  Weight (kg): 56.7  BMI (kg/m2): 18.3  BSA (m2): 1.7T(C): 36.6  HR: 88  BP: 142/61  RR: 18  SpO2: 98%    Constitutional: NAD    Neck:  No JVD  Respiratory: Rhonchi present B/L, patient able to cough up secretions  Cardiovascular: S1 and S2  Gastrointestinal: BS+, soft, NT/ND  Extremities: No peripheral edema  Neurological: A/O x 4    COMMENTS:    The patient is a suitable candidate for the planned procedure unless box checked [ ]  No, explain:

## 2022-04-26 NOTE — SWALLOW BEDSIDE ASSESSMENT ADULT - ASR SWALLOW RECOMMEND DIAG
Suggest further GI evaluation of esophageal motility prior to consideration of MBS Suggest further GI evaluation of esophageal motility prior to consideration of MBS 2/2 concern for esophageal phase dysphagia given c/o globus sensation

## 2022-04-26 NOTE — PROGRESS NOTE ADULT - PROBLEM SELECTOR PLAN 1
EGD shows mild gastritis, no food impaction.  - speech and swallow pending  - GI consult done  - advance diet as tolerated

## 2022-04-26 NOTE — PHYSICAL THERAPY INITIAL EVALUATION ADULT - TRANSFER TRAINING, PT EVAL
GOAL: Patient will perform sit to stand transfers with minAx1 at rolling walker in 2 weeks with proper hand placement

## 2022-04-26 NOTE — SWALLOW BEDSIDE ASSESSMENT ADULT - SWALLOW EVAL: RECOMMENDED FEEDING/EATING TECHNIQUES
allow for swallow between intakes/crush medication (when feasible)/maintain upright posture during/after eating for 30 mins/position upright (90 degrees)/small sips/bites

## 2022-04-26 NOTE — SWALLOW BEDSIDE ASSESSMENT ADULT - SLP PERTINENT HISTORY OF CURRENT PROBLEM
Patient was recently hospitalized for pneumonia and bronchiectasis exacerbation requiring intubation, recently discharged to rehab ([facility] in [place]) on 4/7. Hospital course notable for dysphonia, laryngoscopy with thrush - completed 10d of diflucan with resolution of thrush. S&S eval with mild-moderate dorothea-pharyngeal dysphagia and was recommended puree diet and thin liquids.
Patient was recently hospitalized for pneumonia and bronchiectasis exacerbation requiring intubation, recently discharged to rehab ([facility] in [place]) on 4/7. Hospital course notable for dysphonia, laryngoscopy with thrush - completed 10d of diflucan with resolution of thrush. S&S eval with mild-moderate dorothea-pharyngeal dysphagia and was recommended puree diet and thin liquids.

## 2022-04-26 NOTE — SWALLOW BEDSIDE ASSESSMENT ADULT - ADDITIONAL RECOMMENDATIONS
Monitor for s/s aspiration/laryngeal penetration. If noted:  D/C p.o. intake, provide non-oral nutrition/hydration/meds, and contact this service @ [phone]  Maintain good oral hygiene    LTG: Pt will tolerate least restrictive diet without overt signs or symptoms of penetration or aspiration

## 2022-04-26 NOTE — PHYSICAL THERAPY INITIAL EVALUATION ADULT - ADDITIONAL COMMENTS
pt came to [facility] from rehab facility, pt required assistance with all ADLs, and functional mobility. Per pt she was able to ambulate about 20 feet with assistance and RW.

## 2022-04-26 NOTE — SWALLOW BEDSIDE ASSESSMENT ADULT - COMMENTS
Per H&P: ow suspicion for food impaction , history c/w dysphagia , recently upgraded from pureed , will keep NPO except medications for now until further evaluated, speech and swallow evaluation.  NPO except medications , if unable to tolerate crushed meds in apple sauce , can reformulate cardiac medications to IV if available.    GI following: Patient was doing well until the day of admission. She reports eating a meal of rice, chicken and snapper when she had a sensation of food getting stuck. She drank broth to queta it down x 2 but subsequently vomited the soup and bolus content. Following that, she reports resolution of her symptoms with only mild globus sensation remaining. She is able to tolerate secretions and was taking her medications with water this AM with no difficulty. No similar episodes in the past. No fever, chills, SOB. No nausea or vomiting. No abdominal pain. No change in bowel habits. Most recent documented endoscopy 2017 with small esophageal plaques (pathology was normal), otherwise normal.  # Food impaction - resolved. Now with mild residual globus sensation and tolerating liquids. No previous episodes. DDx includes candidiasis (given recent history), EoE, mechanical obstruction with known tracheomalacia (external compression), malignancy etc. Patient will benefit from a non-urgent endoscopy.  # Anemia - new downtrending Hb with no overt bleeding. Likely has underlying BERNADETTE. # Colorectal cancer s/p total colectomy   # PAF on eliquis. # Asthma. NPO  - Plan for EGD (timing pending suite availability)
Per H&P: ow suspicion for food impaction , history c/w dysphagia , recently upgraded from pureed , will keep NPO except medications for now until further evaluated, speech and swallow evaluation.  NPO except medications , if unable to tolerate crushed meds in apple sauce , can reformulate cardiac medications to IV if available.    GI following: Patient was doing well until the day of admission. She reports eating a meal of rice, chicken and snapper when she had a sensation of food getting stuck. She drank broth to queta it down x 2 but subsequently vomited the soup and bolus content. Following that, she reports resolution of her symptoms with only mild globus sensation remaining. She is able to tolerate secretions and was taking her medications with water this AM with no difficulty. No similar episodes in the past. No fever, chills, SOB. No nausea or vomiting. No abdominal pain. No change in bowel habits. Most recent documented endoscopy 2017 with small esophageal plaques (pathology was normal), otherwise normal.  # Food impaction - resolved. Now with mild residual globus sensation and tolerating liquids. No previous episodes. DDx includes candidiasis (given recent history), EoE, mechanical obstruction with known tracheomalacia (external compression), malignancy etc. Patient will benefit from a non-urgent endoscopy.  # Anemia - new downtrending Hb with no overt bleeding. Likely has underlying BERNADETTE. # Colorectal cancer s/p total colectomy   # PAF on eliquis. # Asthma. NPO  - Plan for EGD (timing pending suite availability)    SWALLOW HISTORY:  3/31/22 FEES performed at [facility]: "The pharyngeal stage was characterized by trace penetration of minced and moist trial without immediate retrieval. Material eventually cleared from laryngeal vestibule with CUED multiple dry swallows. No instances of penetration or aspiration observed during the study with thin liquids or puree. Increase in pharyngeal residue with minced and moist trial vs. puree." Rec: Puree/thins

## 2022-04-26 NOTE — SWALLOW BEDSIDE ASSESSMENT ADULT - SLP GENERAL OBSERVATIONS
Pt encountered bedside, AA&Ox4, pleasant and cooperative. Pt able to follow mx-step directive and answer yes/no questions. Pt reportedly concerned re: sister's recent diagnosis of esophageal Ca and provided with emotional support. Pt reports that "food gets stuck" while pointing to esophagus.

## 2022-04-26 NOTE — SWALLOW BEDSIDE ASSESSMENT ADULT - SWALLOW EVAL: DIAGNOSIS
Orders received and chart reviewed. Per discussion with ALLEY Coto, GI consulted and strictly recommending NPO status until EGD can be performed tomorrow, 4/26. Plan for this service to f/u 4/26 pending clearance post EGD.
71 y.o. was recently hospitalized for PNA and bronchiectasis exacerbation requiring intubation, recently discharged to rehab on 4/7. Hospital course notable for dysphonia, laryngoscopy with thrush - completed course of diflucan with resolution of thrush. Pt presents with fair bolus prep and transport across consistencies. Mild delay in swallow initiation without overt signs or symptoms of penetration or aspiration across any trial. Pt reportedly intermittently sense globus sensation while pointing substernally, suspicious for an esophageal stage dysphagia. Given h/o dysphagia and concern for episodic food impaction, would defer to GI for further assessment, (i.e. esophagram?). Cannot subjectively upgrade pt to solids given h/o penetration with minced/moist solids on recent FEES.

## 2022-04-26 NOTE — SWALLOW BEDSIDE ASSESSMENT ADULT - H & P REVIEW
1 F w/  tracheobronchomalasia s/p tracheobronchoplasty, bronchiectasis, severe persistent asthma (steroid dependent), adrenal insuffiencey on chronic steroids, DM2, HTN, colorectal cancer s/p total colectomy now with colostomy, PAF on Eliquis,  recent hospitalizations for pneumonia, presents from  rehab ([facility] in [place])  for dysphagia  x 1 day/yes 71 F w/  tracheobronchomalasia s/p tracheobronchoplasty, bronchiectasis, severe persistent asthma (steroid dependent), adrenal insuffiencey on chronic steroids, DM2, HTN, colorectal cancer s/p total colectomy now with colostomy, PAF on Eliquis,  recent hospitalizations for pneumonia, presents from  rehab ([facility] in [place])  for dysphagia  x 1 day/yes

## 2022-04-26 NOTE — PHYSICAL THERAPY INITIAL EVALUATION ADULT - DISCHARGE DISPOSITION, PT EVAL
return to subacute rehab facility [facility]/rehabilitation [facility]

## 2022-04-26 NOTE — PHYSICAL THERAPY INITIAL EVALUATION ADULT - ACTIVE RANGE OF MOTION EXAMINATION, REHAB EVAL
BLE knee flexion limited/bilateral upper extremity Active ROM was WFL (within functional limits)/bilateral  lower extremity Active ROM was WFL (within functional limits)

## 2022-04-26 NOTE — PHYSICAL THERAPY INITIAL EVALUATION ADULT - PERTINENT HX OF CURRENT PROBLEM, REHAB EVAL
71 F w/  tracheobronchomalasia s/p tracheobronchoplasty, bronchiectasis, severe persistent asthma (steroid dependent), adrenal insuffiencey on chronic steroids, DM2, HTN, colorectal cancer s/p total colectomy now with colostomy, PAF on Eliquis,  recent hospitalizations for pneumonia, presents from  rehab ([facility] in [place])  for dysphagia  x 1 day. Hosp course: 4/24 CXR neg, EKG nsr 63 bpm. 4/26 EGD

## 2022-04-27 ENCOUNTER — TRANSCRIPTION ENCOUNTER (OUTPATIENT)
Age: 72
End: 2022-04-27

## 2022-04-27 LAB
ANION GAP SERPL CALC-SCNC: 11 MMOL/L — SIGNIFICANT CHANGE UP (ref 5–17)
BUN SERPL-MCNC: 8 MG/DL — SIGNIFICANT CHANGE UP (ref 7–23)
CALCIUM SERPL-MCNC: 8.3 MG/DL — LOW (ref 8.4–10.5)
CHLORIDE SERPL-SCNC: 106 MMOL/L — SIGNIFICANT CHANGE UP (ref 96–108)
CO2 SERPL-SCNC: 25 MMOL/L — SIGNIFICANT CHANGE UP (ref 22–31)
CREAT SERPL-MCNC: 0.35 MG/DL — LOW (ref 0.5–1.3)
EGFR: 108 ML/MIN/1.73M2 — SIGNIFICANT CHANGE UP
GLUCOSE BLDC GLUCOMTR-MCNC: 139 MG/DL — HIGH (ref 70–99)
GLUCOSE BLDC GLUCOMTR-MCNC: 168 MG/DL — HIGH (ref 70–99)
GLUCOSE BLDC GLUCOMTR-MCNC: 251 MG/DL — HIGH (ref 70–99)
GLUCOSE BLDC GLUCOMTR-MCNC: 252 MG/DL — HIGH (ref 70–99)
GLUCOSE SERPL-MCNC: 301 MG/DL — HIGH (ref 70–99)
GRAM STN FLD: SIGNIFICANT CHANGE UP
HCT VFR BLD CALC: 34.4 % — LOW (ref 34.5–45)
HGB BLD-MCNC: 9.9 G/DL — LOW (ref 11.5–15.5)
MCHC RBC-ENTMCNC: 22.8 PG — LOW (ref 27–34)
MCHC RBC-ENTMCNC: 28.8 GM/DL — LOW (ref 32–36)
MCV RBC AUTO: 79.1 FL — LOW (ref 80–100)
NRBC # BLD: 0 /100 WBCS — SIGNIFICANT CHANGE UP (ref 0–0)
PLATELET # BLD AUTO: 270 K/UL — SIGNIFICANT CHANGE UP (ref 150–400)
POTASSIUM SERPL-MCNC: 3.9 MMOL/L — SIGNIFICANT CHANGE UP (ref 3.5–5.3)
POTASSIUM SERPL-SCNC: 3.9 MMOL/L — SIGNIFICANT CHANGE UP (ref 3.5–5.3)
RBC # BLD: 4.35 M/UL — SIGNIFICANT CHANGE UP (ref 3.8–5.2)
RBC # FLD: 21.2 % — HIGH (ref 10.3–14.5)
SODIUM SERPL-SCNC: 142 MMOL/L — SIGNIFICANT CHANGE UP (ref 135–145)
SPECIMEN SOURCE: SIGNIFICANT CHANGE UP
SURGICAL PATHOLOGY STUDY: SIGNIFICANT CHANGE UP
WBC # BLD: 7.32 K/UL — SIGNIFICANT CHANGE UP (ref 3.8–10.5)
WBC # FLD AUTO: 7.32 K/UL — SIGNIFICANT CHANGE UP (ref 3.8–10.5)

## 2022-04-27 PROCEDURE — 99232 SBSQ HOSP IP/OBS MODERATE 35: CPT

## 2022-04-27 PROCEDURE — 99233 SBSQ HOSP IP/OBS HIGH 50: CPT

## 2022-04-27 PROCEDURE — 74221 X-RAY XM ESOPHAGUS 2CNTRST: CPT | Mod: 26

## 2022-04-27 RX ORDER — CHLORHEXIDINE GLUCONATE 213 G/1000ML
1 SOLUTION TOPICAL DAILY
Refills: 0 | Status: DISCONTINUED | OUTPATIENT
Start: 2022-04-27 | End: 2022-04-28

## 2022-04-27 RX ORDER — OXYCODONE AND ACETAMINOPHEN 5; 325 MG/1; MG/1
1 TABLET ORAL ONCE
Refills: 0 | Status: DISCONTINUED | OUTPATIENT
Start: 2022-04-27 | End: 2022-04-27

## 2022-04-27 RX ADMIN — POLYETHYLENE GLYCOL 3350 17 GRAM(S): 17 POWDER, FOR SOLUTION ORAL at 06:34

## 2022-04-27 RX ADMIN — LACTULOSE 15 GRAM(S): 10 SOLUTION ORAL at 15:32

## 2022-04-27 RX ADMIN — Medication 3: at 10:24

## 2022-04-27 RX ADMIN — APIXABAN 5 MILLIGRAM(S): 2.5 TABLET, FILM COATED ORAL at 06:33

## 2022-04-27 RX ADMIN — OXYCODONE AND ACETAMINOPHEN 1 TABLET(S): 5; 325 TABLET ORAL at 18:30

## 2022-04-27 RX ADMIN — Medication 4 UNIT(S): at 17:53

## 2022-04-27 RX ADMIN — Medication 150 MILLIGRAM(S): at 17:53

## 2022-04-27 RX ADMIN — MEXILETINE HYDROCHLORIDE 200 MILLIGRAM(S): 150 CAPSULE ORAL at 22:05

## 2022-04-27 RX ADMIN — PANTOPRAZOLE SODIUM 40 MILLIGRAM(S): 20 TABLET, DELAYED RELEASE ORAL at 06:33

## 2022-04-27 RX ADMIN — Medication 8 MILLIGRAM(S): at 06:33

## 2022-04-27 RX ADMIN — SENNA PLUS 2 TABLET(S): 8.6 TABLET ORAL at 22:05

## 2022-04-27 RX ADMIN — Medication 150 MILLIGRAM(S): at 06:33

## 2022-04-27 RX ADMIN — INSULIN GLARGINE 10 UNIT(S): 100 INJECTION, SOLUTION SUBCUTANEOUS at 22:08

## 2022-04-27 RX ADMIN — SODIUM CHLORIDE 30 MILLILITER(S): 9 INJECTION INTRAMUSCULAR; INTRAVENOUS; SUBCUTANEOUS at 06:35

## 2022-04-27 RX ADMIN — POLYETHYLENE GLYCOL 3350 17 GRAM(S): 17 POWDER, FOR SOLUTION ORAL at 22:05

## 2022-04-27 RX ADMIN — MEXILETINE HYDROCHLORIDE 200 MILLIGRAM(S): 150 CAPSULE ORAL at 15:34

## 2022-04-27 RX ADMIN — OXYCODONE AND ACETAMINOPHEN 1 TABLET(S): 5; 325 TABLET ORAL at 17:57

## 2022-04-27 RX ADMIN — Medication 4 UNIT(S): at 15:30

## 2022-04-27 RX ADMIN — MEXILETINE HYDROCHLORIDE 200 MILLIGRAM(S): 150 CAPSULE ORAL at 06:33

## 2022-04-27 RX ADMIN — Medication 1: at 15:30

## 2022-04-27 RX ADMIN — Medication 3: at 17:54

## 2022-04-27 RX ADMIN — TIOTROPIUM BROMIDE 1 CAPSULE(S): 18 CAPSULE ORAL; RESPIRATORY (INHALATION) at 15:39

## 2022-04-27 RX ADMIN — ATORVASTATIN CALCIUM 20 MILLIGRAM(S): 80 TABLET, FILM COATED ORAL at 22:05

## 2022-04-27 RX ADMIN — APIXABAN 5 MILLIGRAM(S): 2.5 TABLET, FILM COATED ORAL at 17:52

## 2022-04-27 NOTE — DISCHARGE NOTE PROVIDER - NSDCMRMEDTOKEN_GEN_ALL_CORE_FT
acetaminophen 325 mg oral tablet: 3 tab(s) orally every 6 hours, As needed, Temp greater or equal to 38C (100.4F), Mild Pain (1 - 3)  Admelog 100 units/mL injectable solution: 9 unit(s) injectable 3 times a day  aluminum hydroxide-magnesium hydroxide 200 mg-200 mg/5 mL oral suspension: 30 milliliter(s) orally every 4 hours, As needed, Dyspepsia  apixaban 5 mg oral tablet: 1 tab(s) orally every 12 hours  ascorbic acid 500 mg oral tablet: 1 tab(s) orally once a day  chlorhexidine 2% topical pad: Apply topically to affected area once a day  Crestor 5 mg oral tablet: 1 tab(s) orally once a day (at bedtime)  insulin glargine 100 units/mL subcutaneous solution: 20 unit(s) subcutaneous once a day (at bedtime)  insulin lispro 100 units/mL injectable solution: 1 Unit(s) if Glucose 151 - 200  2 Unit(s) if Glucose 201 - 250  3 Unit(s) if Glucose 251 - 300  4 Unit(s) if Glucose 301 - 350  5 Unit(s) if Glucose 351 - 400  6 Unit(s) if Glucose Greater Than 400  Three times a day before meals  insulin lispro 100 units/mL injectable solution: 0 Unit(s) if Glucose 0 - 250  1 Unit(s) if Glucose 251 - 300  2 Unit(s) if Glucose 301 - 350  3 Unit(s) if Glucose 351 - 400  4 Unit(s) if Glucose Greater Than 400  Subcutaneous at bedtime  ipratropium-albuterol 0.5 mg-2.5 mg/3 mL inhalation solution: 3 milliliter(s) inhaled every 6 hours  lactulose 10 g/15 mL oral syrup: 22.5 milliliter(s) orally once a day  methylPREDNISolone 8 mg oral tablet: 1 tab(s) orally once a day  mexiletine 200 mg oral capsule: 1 cap(s) orally 3 times a day  Multiple Vitamins oral tablet: 1 tab(s) orally once a day  ondansetron 2 mg/mL injectable solution: 2 milliliter(s) injectable every 8 hours, As Needed  pantoprazole 40 mg oral delayed release tablet: 1 tab(s) orally once a day (before a meal)  polyethylene glycol 3350 oral powder for reconstitution: 17 gram(s) orally 2 times a day  pregabalin 150 mg oral capsule: 1 cap(s) orally 2 times a day  senna oral tablet: 2 tab(s) orally once a day (at bedtime)  sodium chloride 7% inhalation solution: 4 milliliter(s) inhaled every 12 hours  Spiriva 18 mcg inhalation capsule: 1 cap(s) inhaled once a day  zinc sulfate 220 mg oral capsule: 1 cap(s) orally once a day   acetaminophen 325 mg oral tablet: 3 tab(s) orally every 6 hours, As needed, Temp greater or equal to 38C (100.4F), Mild Pain (1 - 3)  Admelog 100 units/mL injectable solution: 4 unit(s) injectable 3 times a day (before meals)  aluminum hydroxide-magnesium hydroxide 200 mg-200 mg/5 mL oral suspension: 30 milliliter(s) orally every 4 hours, As needed, Dyspepsia  apixaban 5 mg oral tablet: 1 tab(s) orally every 12 hours  ascorbic acid 500 mg oral tablet: 1 tab(s) orally once a day  Crestor 5 mg oral tablet: 1 tab(s) orally once a day (at bedtime)  fluticasone 50 mcg/inh nasal spray: 1 spray(s) nasal 2 times a day  insulin glargine 100 units/mL subcutaneous solution: 10 unit(s) subcutaneous once a day (at bedtime)  insulin lispro 100 units/mL injectable solution: 1 Unit(s) if Glucose 151 - 200  2 Unit(s) if Glucose 201 - 250  3 Unit(s) if Glucose 251 - 300  4 Unit(s) if Glucose 301 - 350  5 Unit(s) if Glucose 351 - 400  6 Unit(s) if Glucose Greater Than 400  Three times a day before meals  insulin lispro 100 units/mL injectable solution: 0 Unit(s) if Glucose 0 - 250  1 Unit(s) if Glucose 251 - 300  2 Unit(s) if Glucose 301 - 350  3 Unit(s) if Glucose 351 - 400  4 Unit(s) if Glucose Greater Than 400  Subcutaneous at bedtime  ipratropium-albuterol 0.5 mg-2.5 mg/3 mL inhalation solution: 3 milliliter(s) inhaled every 6 hours  lactulose 10 g/15 mL oral syrup: 22.5 milliliter(s) orally once a day  methylPREDNISolone 8 mg oral tablet: 1 tab(s) orally once a day  mexiletine 200 mg oral capsule: 1 cap(s) orally 3 times a day  Multiple Vitamins oral tablet: 1 tab(s) orally once a day  pantoprazole 40 mg oral delayed release tablet: 1 tab(s) orally once a day (before a meal)  polyethylene glycol 3350 oral powder for reconstitution: 17 gram(s) orally 2 times a day  pregabalin 150 mg oral capsule: 1 cap(s) orally 2 times a day  senna oral tablet: 2 tab(s) orally once a day (at bedtime)  Spiriva 18 mcg inhalation capsule: 1 cap(s) inhaled once a day  zinc sulfate 220 mg oral capsule: 1 cap(s) orally once a day

## 2022-04-27 NOTE — DISCHARGE NOTE PROVIDER - HOSPITAL COURSE
71 year old female with past medical history significant for  tracheobronchomalasia s/p tracheobronchoplasty, bronchiectasis, severe persistent asthma (steroid dependent), adrenal insuffiencey on chronic steroids, DM2, HTN, colorectal cancer s/p total colectomy now with colostomy, PAF on Eliquis, 2 recent hospitalizations for pneumonia and bronchiectasis exacerbation requiring intubation most recently discharged to rehab ([facility] in [place]) on 4/7 presenting with sensation of  food stuck in throat for the past day.   Patient reports feeling as if food was stuck in her throat after eating some rice and chicken, she tried to queta it down with soup , but ended up vomiting it up.  She reports residual chest discomfort , but not spitting up anything else and swallowing her saliva. She reports no nausea , no abdominal pain , she has normal ostomy output , is passing gas . She reports no difficulty breathing , no wheezing.   She has been on pureed diet recently switched to soft foods. Per family,  she had mesh placed near the epiglottis due to frequent aspiration.  EGD showed gastric polyps and gastritis.  Outpt f/up Dr. Saxena for motility study and biopsy results. 71 year old female with past medical history significant for  tracheobronchomalasia s/p tracheobronchoplasty, bronchiectasis, severe persistent asthma (steroid dependent), adrenal insuffiencey on chronic steroids, DM2, HTN, colorectal cancer s/p total colectomy now with colostomy, PAF on Eliquis, 2 recent hospitalizations for pneumonia and bronchiectasis exacerbation requiring intubation most recently discharged to rehab ([facility] in [place]) on 4/7 presenting with sensation of  food stuck in throat for the past day.   Patient reports feeling as if food was stuck in her throat after eating some rice and chicken, she tried to queta it down with soup , but ended up vomiting it up.  She reports residual chest discomfort , but not spitting up anything else and swallowing her saliva. She reports no nausea , no abdominal pain , she has normal ostomy output , is passing gas . She reports no difficulty breathing , no wheezing.   She has been on pureed diet recently switched to soft foods. Per family,  she had mesh placed near the epiglottis due to frequent aspiration.  EGD showed gastric polyps and gastritis.  Outpt f/up Dr. Saxena for motility study and biopsy results.

## 2022-04-27 NOTE — DISCHARGE NOTE PROVIDER - NSFOLLOWUPCLINICS_GEN_ALL_ED_FT
[facility]  Podiatry  [address] [place] [address] - [address] [place] [address]  Phone: [phone]  Fax:

## 2022-04-27 NOTE — DISCHARGE NOTE PROVIDER - NSDCFUADDINST_GEN_ALL_CORE_FT
Outpt f/up Dr. Saxena for motility study and biopsy results.  Outpt f/up Dr. Saxena for motility study and biopsy results.   ______________________________________________  Right medial foot ulceration: Stable/noninfected  Xerosis plantarly bilateral  Diabetes mellitus    Recommend continued Decubitus precautions and Z flow boots.  Recommend Betadine paint and Allevyn foam dressing to right medial foot ulcer every other day.  Recommend daily use of moisturizer to foot  __________________________________________  Sacral/bilateral Buttocks deep tissue injury present on admission in evolution  Incontinence of bowel and bladder  Incontinence Dermatitis    Recommend:  1.) topical therapy: sacral/buttock injury - cleanse with incontinence cleanser, pat dry, apply medipaste to slough, cover with allevyn daily  2.) Incontinence Management - incontinence cleanser, pads, pericare BID, continue external female urinary catheter  3.) Maintain on an alternating air with low air loss surface  4.) Turn and reposition Q 2 hours  5.) Nutrition optimization

## 2022-04-27 NOTE — DISCHARGE NOTE PROVIDER - CARE PROVIDER_API CALL
Tavo Saxena)  Gastroenterology; Internal Medicine  [address] [place] [address]  Phone: [phone]  Fax: [phone]  Follow Up Time: 1 week

## 2022-04-27 NOTE — DISCHARGE NOTE PROVIDER - NSDCFUSCHEDAPPT_GEN_ALL_CORE_FT
Eulalio Allison  [facility]  [facility] [address]  Scheduled Appointment: 05/31/2022     Eulalio Allison  [facility]  PulmMed [address] [facility]  Scheduled Appointment: 05/31/2022    [place]  [facility]  Med GenInt [address] [facility]  Scheduled Appointment: 07/26/2022

## 2022-04-27 NOTE — PROGRESS NOTE ADULT - PROBLEM SELECTOR PLAN 1
EGD shows mild gastritis, no food impaction.  - speech and swallow done  - GI consult done  - puree diet  - f/up esophagogram EGD shows mild gastritis, no food impaction.  - speech and swallow done  - GI consult done  - puree diet  - f/up esophagogram - no stricture or mass  - outpt motility study

## 2022-04-27 NOTE — PROGRESS NOTE ADULT - NSPROGADDITIONALINFOA_GEN_ALL_CORE
dc to rehab once acute issues resolved  plan of care discussed with floor NP/PA dc to rehab 4/28 with GI follow up.  plan of care discussed with floor NP/PA

## 2022-04-27 NOTE — DISCHARGE NOTE PROVIDER - NSDCCPCAREPLAN_GEN_ALL_CORE_FT
PRINCIPAL DISCHARGE DIAGNOSIS  Diagnosis: Food impaction of esophagus  Assessment and Plan of Treatment:        PRINCIPAL DISCHARGE DIAGNOSIS  Diagnosis: Food impaction of esophagus  Assessment and Plan of Treatment: EGD shows mild gastritis, no food impaction.  Puree diet  Esophagogram - no stricture or mass  Out-patient motility study  Follow up with gastroenterologist      SECONDARY DISCHARGE DIAGNOSES  Diagnosis: Dysphagia  Assessment and Plan of Treatment: Pureed diet, as tolerated  -Aspiration precautions: small bites/sips; slow rate; alternate food and liquids; crush medications and place in applesauce; remain upright during and at least 30 min post meal  -Maintain good oral hygiene  -Pt may obtain an MBS as an outpatient to further assess pharyngeal stage of swallow    Diagnosis: Atrial fibrillation  Assessment and Plan of Treatment: Atrial fibrillation is the most common heart rhythm problem.  The condition puts you at risk for has stroke and heart attack  It helps if you control your blood pressure, not drink more than 1-2 alcohol drinks per day, cut down on caffeine, getting treatment for over active thyroid gland, and get regular exercise  Call your doctor if you feel your heart racing or beating unusually, chest tightness or pain, lightheaded, faint, shortness of breath especially with exercise  It is important to take your heart medication as prescribed  You may be on anticoagulation which is very important to take as directed - you may need blood work to monitor drug levels      Diagnosis: Diabetes  Assessment and Plan of Treatment: Make sure you get your HgA1c checked every three months.  If you take oral diabetes medications, check your blood glucose two times a day.  If you take insulin, check your blood glucose before meals and at bedtime.  It's important not to skip any meals.  Keep a log of your blood glucose results and always take it with you to your doctor appointments.  Keep a list of your current medications including injectables and over the counter medications and bring this medication list with you to all your doctor appointments.  If you have not seen your ophthalmologist this year call for appointment.  Check your feet daily for redness, sores, or openings. Do not self treat. If no improvement in two days call your primary care physician for an appointment.  Low blood sugar (hypoglycemia) is a blood sugar below 70mg/dl. Check your blood sugar if you feel signs/symptoms of hypoglycemia. If your blood sugar is below 70 take 15 grams of carbohydrates (ex 4 oz of apple juice, 3-4 glucose tablets, or 4-6 oz of regular soda) wait 15 minutes and repeat blood sugar to make sure it comes up above 70.  If your blood sugar is above 70 and you are due for a meal, have a meal.  If you are not due for a meal have a snack.  This snack helps keeps your blood sugar at a safe range.

## 2022-04-27 NOTE — DISCHARGE NOTE PROVIDER - INSTRUCTIONS
-Pureed diet, as tolerated  -Aspiration precautions: small bites/sips; slow rate; alternate food and liquids; crush medications and place in applesauce; remain upright during and at least 30 min post meal

## 2022-04-27 NOTE — DISCHARGE NOTE PROVIDER - NSDCFUADDAPPT_GEN_ALL_CORE_FT
Follow up with speech pathologist for Modified barium swallow while at rehab.   Also follow up with podiatry if right foot pain persist.

## 2022-04-28 ENCOUNTER — TRANSCRIPTION ENCOUNTER (OUTPATIENT)
Age: 72
End: 2022-04-28

## 2022-04-28 VITALS
RESPIRATION RATE: 18 BRPM | DIASTOLIC BLOOD PRESSURE: 67 MMHG | OXYGEN SATURATION: 97 % | SYSTOLIC BLOOD PRESSURE: 112 MMHG | HEART RATE: 86 BPM | TEMPERATURE: 98 F

## 2022-04-28 LAB
-  AMPICILLIN/SULBACTAM: SIGNIFICANT CHANGE UP
-  CEFAZOLIN: SIGNIFICANT CHANGE UP
-  CLINDAMYCIN: SIGNIFICANT CHANGE UP
-  ERYTHROMYCIN: SIGNIFICANT CHANGE UP
-  GENTAMICIN: SIGNIFICANT CHANGE UP
-  LINEZOLID: SIGNIFICANT CHANGE UP
-  OXACILLIN: SIGNIFICANT CHANGE UP
-  PENICILLIN: SIGNIFICANT CHANGE UP
-  RIFAMPIN: SIGNIFICANT CHANGE UP
-  TETRACYCLINE: SIGNIFICANT CHANGE UP
-  TRIMETHOPRIM/SULFAMETHOXAZOLE: SIGNIFICANT CHANGE UP
-  VANCOMYCIN: SIGNIFICANT CHANGE UP
ANION GAP SERPL CALC-SCNC: 9 MMOL/L — SIGNIFICANT CHANGE UP (ref 5–17)
BUN SERPL-MCNC: 6 MG/DL — LOW (ref 7–23)
CALCIUM SERPL-MCNC: 8.3 MG/DL — LOW (ref 8.4–10.5)
CHLORIDE SERPL-SCNC: 105 MMOL/L — SIGNIFICANT CHANGE UP (ref 96–108)
CO2 SERPL-SCNC: 28 MMOL/L — SIGNIFICANT CHANGE UP (ref 22–31)
CREAT SERPL-MCNC: 0.37 MG/DL — LOW (ref 0.5–1.3)
CULTURE RESULTS: SIGNIFICANT CHANGE UP
EGFR: 106 ML/MIN/1.73M2 — SIGNIFICANT CHANGE UP
GLUCOSE BLDC GLUCOMTR-MCNC: 211 MG/DL — HIGH (ref 70–99)
GLUCOSE BLDC GLUCOMTR-MCNC: 222 MG/DL — HIGH (ref 70–99)
GLUCOSE SERPL-MCNC: 252 MG/DL — HIGH (ref 70–99)
HCT VFR BLD CALC: 35 % — SIGNIFICANT CHANGE UP (ref 34.5–45)
HGB BLD-MCNC: 10.1 G/DL — LOW (ref 11.5–15.5)
MCHC RBC-ENTMCNC: 22.6 PG — LOW (ref 27–34)
MCHC RBC-ENTMCNC: 28.9 GM/DL — LOW (ref 32–36)
MCV RBC AUTO: 78.3 FL — LOW (ref 80–100)
METHOD TYPE: SIGNIFICANT CHANGE UP
NRBC # BLD: 0 /100 WBCS — SIGNIFICANT CHANGE UP (ref 0–0)
ORGANISM # SPEC MICROSCOPIC CNT: SIGNIFICANT CHANGE UP
ORGANISM # SPEC MICROSCOPIC CNT: SIGNIFICANT CHANGE UP
PLATELET # BLD AUTO: 285 K/UL — SIGNIFICANT CHANGE UP (ref 150–400)
POTASSIUM SERPL-MCNC: 3.4 MMOL/L — LOW (ref 3.5–5.3)
POTASSIUM SERPL-SCNC: 3.4 MMOL/L — LOW (ref 3.5–5.3)
RBC # BLD: 4.47 M/UL — SIGNIFICANT CHANGE UP (ref 3.8–5.2)
RBC # FLD: 21 % — HIGH (ref 10.3–14.5)
SODIUM SERPL-SCNC: 142 MMOL/L — SIGNIFICANT CHANGE UP (ref 135–145)
SPECIMEN SOURCE: SIGNIFICANT CHANGE UP
WBC # BLD: 7.79 K/UL — SIGNIFICANT CHANGE UP (ref 3.8–10.5)
WBC # FLD AUTO: 7.79 K/UL — SIGNIFICANT CHANGE UP (ref 3.8–10.5)

## 2022-04-28 PROCEDURE — 99239 HOSP IP/OBS DSCHRG MGMT >30: CPT

## 2022-04-28 PROCEDURE — 97162 PT EVAL MOD COMPLEX 30 MIN: CPT

## 2022-04-28 PROCEDURE — 74220 X-RAY XM ESOPHAGUS 1CNTRST: CPT

## 2022-04-28 PROCEDURE — 71045 X-RAY EXAM CHEST 1 VIEW: CPT

## 2022-04-28 PROCEDURE — 99285 EMERGENCY DEPT VISIT HI MDM: CPT | Mod: 25

## 2022-04-28 PROCEDURE — 80053 COMPREHEN METABOLIC PANEL: CPT

## 2022-04-28 PROCEDURE — 97110 THERAPEUTIC EXERCISES: CPT

## 2022-04-28 PROCEDURE — 94640 AIRWAY INHALATION TREATMENT: CPT

## 2022-04-28 PROCEDURE — 92526 ORAL FUNCTION THERAPY: CPT

## 2022-04-28 PROCEDURE — 86901 BLOOD TYPING SEROLOGIC RH(D): CPT

## 2022-04-28 PROCEDURE — 85610 PROTHROMBIN TIME: CPT

## 2022-04-28 PROCEDURE — 86850 RBC ANTIBODY SCREEN: CPT

## 2022-04-28 PROCEDURE — 85027 COMPLETE CBC AUTOMATED: CPT

## 2022-04-28 PROCEDURE — 97530 THERAPEUTIC ACTIVITIES: CPT

## 2022-04-28 PROCEDURE — 82962 GLUCOSE BLOOD TEST: CPT

## 2022-04-28 PROCEDURE — 85730 THROMBOPLASTIN TIME PARTIAL: CPT

## 2022-04-28 PROCEDURE — 92610 EVALUATE SWALLOWING FUNCTION: CPT

## 2022-04-28 PROCEDURE — 87070 CULTURE OTHR SPECIMN AEROBIC: CPT

## 2022-04-28 PROCEDURE — 86900 BLOOD TYPING SEROLOGIC ABO: CPT

## 2022-04-28 PROCEDURE — 36415 COLL VENOUS BLD VENIPUNCTURE: CPT

## 2022-04-28 PROCEDURE — 87635 SARS-COV-2 COVID-19 AMP PRB: CPT

## 2022-04-28 PROCEDURE — 88305 TISSUE EXAM BY PATHOLOGIST: CPT

## 2022-04-28 PROCEDURE — 87186 SC STD MICRODIL/AGAR DIL: CPT

## 2022-04-28 PROCEDURE — 85025 COMPLETE CBC W/AUTO DIFF WBC: CPT

## 2022-04-28 PROCEDURE — 80048 BASIC METABOLIC PNL TOTAL CA: CPT

## 2022-04-28 PROCEDURE — 99232 SBSQ HOSP IP/OBS MODERATE 35: CPT

## 2022-04-28 PROCEDURE — 74221 X-RAY XM ESOPHAGUS 2CNTRST: CPT

## 2022-04-28 RX ORDER — OXYCODONE AND ACETAMINOPHEN 5; 325 MG/1; MG/1
1 TABLET ORAL ONCE
Refills: 0 | Status: DISCONTINUED | OUTPATIENT
Start: 2022-04-28 | End: 2022-04-28

## 2022-04-28 RX ORDER — FLUTICASONE PROPIONATE 50 MCG
1 SPRAY, SUSPENSION NASAL
Qty: 0 | Refills: 0 | DISCHARGE
Start: 2022-04-28

## 2022-04-28 RX ORDER — INSULIN GLARGINE 100 [IU]/ML
10 INJECTION, SOLUTION SUBCUTANEOUS
Qty: 0 | Refills: 0 | DISCHARGE
Start: 2022-04-28

## 2022-04-28 RX ORDER — POTASSIUM CHLORIDE 20 MEQ
40 PACKET (EA) ORAL ONCE
Refills: 0 | Status: COMPLETED | OUTPATIENT
Start: 2022-04-28 | End: 2022-04-28

## 2022-04-28 RX ADMIN — APIXABAN 5 MILLIGRAM(S): 2.5 TABLET, FILM COATED ORAL at 06:42

## 2022-04-28 RX ADMIN — Medication 40 MILLIEQUIVALENT(S): at 14:27

## 2022-04-28 RX ADMIN — Medication 4 UNIT(S): at 13:03

## 2022-04-28 RX ADMIN — MEXILETINE HYDROCHLORIDE 200 MILLIGRAM(S): 150 CAPSULE ORAL at 06:43

## 2022-04-28 RX ADMIN — Medication 2: at 09:03

## 2022-04-28 RX ADMIN — TIOTROPIUM BROMIDE 1 CAPSULE(S): 18 CAPSULE ORAL; RESPIRATORY (INHALATION) at 14:27

## 2022-04-28 RX ADMIN — Medication 150 MILLIGRAM(S): at 06:43

## 2022-04-28 RX ADMIN — LACTULOSE 15 GRAM(S): 10 SOLUTION ORAL at 13:02

## 2022-04-28 RX ADMIN — MEXILETINE HYDROCHLORIDE 200 MILLIGRAM(S): 150 CAPSULE ORAL at 13:02

## 2022-04-28 RX ADMIN — Medication 4 UNIT(S): at 09:04

## 2022-04-28 RX ADMIN — Medication 2: at 13:03

## 2022-04-28 RX ADMIN — Medication 8 MILLIGRAM(S): at 06:42

## 2022-04-28 RX ADMIN — PANTOPRAZOLE SODIUM 40 MILLIGRAM(S): 20 TABLET, DELAYED RELEASE ORAL at 06:43

## 2022-04-28 RX ADMIN — POLYETHYLENE GLYCOL 3350 17 GRAM(S): 17 POWDER, FOR SOLUTION ORAL at 13:01

## 2022-04-28 RX ADMIN — OXYCODONE AND ACETAMINOPHEN 1 TABLET(S): 5; 325 TABLET ORAL at 13:45

## 2022-04-28 NOTE — PROGRESS NOTE ADULT - PROVIDER SPECIALTY LIST ADULT
Pulmonology
Wound Care
Podiatry
Pulmonology
Pulmonology
Hospitalist

## 2022-04-28 NOTE — PROGRESS NOTE ADULT - TIME-BASED
Physical Exam  Mallampati: II  TM Distance: >3 FB  Neck ROM: Full  cardiovascular exam normal  pulmonary exam normal  Patient Demonstrates:  Obese  dental exam normal      Anesthesia Plan  ASA Status: 2  Anesthesia Type: MAC  Reviewed: Pre-Induction Reassessment, NPO Status, Medications, Past Med History, Problem List, Allergies, DNR Status, Patient Summary, Consultations and Nursing Notes  The proposed anesthetic plan, including its risks and benefits, have been discussed with the Patient - along with the risks and benefits of alternatives.  Questions were encouraged and answered and the patient and/or representative agrees to proceed.  Blood Products: Not Anticipated      Anesthesia ROS/Med Hx        GI/HEPATIC/RENAL Review:    Pt. positive for GERD    
38

## 2022-04-28 NOTE — DISCHARGE NOTE NURSING/CASE MANAGEMENT/SOCIAL WORK - PATIENT PORTAL LINK FT
You can access the FollowMyHealth Patient Portal offered by [facility] by registering at the following website: [email or web address]. By joining [employer]’s FollowMyHealth portal, you will also be able to view your health information using other applications (apps) compatible with our system.

## 2022-04-28 NOTE — PROGRESS NOTE ADULT - ASSESSMENT
71F extensive medical history including rectal adenocarcinoma s/p colectomy/colostomy, TBM s/p tracheobplasty, severe persistent asthma (Dupixent and steroids), bronchiectasis and chronic bronchitis, and dysphagia who presents with the sensation of food being stuck in her throat.    1. Dysphagia - sensation of food being stuck-likely due to eating in bed (position) and motility issues  - GI followup s/p EGD--await esophagram  - Aspiration precautions    2. Severe persistent asthma - steroid dependent (also with adrenal insufficiency)  - no acute respiratory distress  - maintain O2 sat > 88%  - incentive spirometer and acapella to facilitate breathing. If sputum develops may benefit from chest vest  - Continue Medrol at home dose  - Continue Dupixent as an outpatient  - Patient has been treated with multiple bronchodilators in the past: can use Symbicort, Spiriva, Accolate, and Duonebs while hospitalized    3. Tracheobronchomalacia s/p tracheoplasty in the past with Dr. Zapien at [facility]    4. 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 Infectious Disease   - recent hospitalizations at [facility] and then [facility] in March 2022 with bacterial pneumonia treated with antibiotics  - currently appears nontoxic appearing   - currently on isolation for prior MRSA pneumonia/infection    6. GERD and dysphagia - PPI   - GI followup  7. Rhinitis-- Flonase  8. Cardiovascular - history of Afib with AC on hold  - Continue Mexilitene for prior PVCs             - Would suggest clarifying cardiac history  9. Prophylaxis  - DVT proph - AC currently on hold for EGD  - Aspiration precautions   - Maintain O2 sat > 88%    - Incentive spirometer and acapella  ********************  4/26-stable resp status  4/27-no new issues-await esophagram
71F extensive medical history including rectal adenocarcinoma s/p colectomy/colostomy, TBM s/p tracheobplasty, severe persistent asthma (Dupixent and steroids), bronchiectasis and chronic bronchitis, and dysphagia who presents with the sensation of food being stuck in her throat.    1. Dysphagia - sensation of food being stuck-likely due to eating in bed (position) and motility issues  - GI followup with plan for EGD  - Aspiration precautions    2. Severe persistent asthma - steroid dependent (also with adrenal insufficiency)  - no acute respiratory distress  - maintain O2 sat > 88%  - incentive spirometer and acapella to facilitate breathing. If sputum develops may benefit from chest vest  - Continue Medrol at home dose  - Continue Dupixent as an outpatient  - Patient has been treated with multiple bronchodilators in the past: can use Symbicort, Spiriva, Accolate, and Duonebs while hospitalized    3. Tracheobronchomalacia s/p tracheoplasty in the past with Dr. Zapien at [facility]    4. 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 Infectious Disease   - recent hospitalizations at [facility] and then [facility] in March 2022 with bacterial pneumonia treated with antibiotics  - currently appears nontoxic appearing   - currently on isolation for prior MRSA pneumonia/infection    6. GERD and dysphagia - PPI   - GI followup  7. Rhinitis-- Flonase  8. Cardiovascular - history of Afib with AC on hold  - Continue Mexilitene for prior PVCs             - Would suggest clarifying cardiac history  9. Prophylaxis  - DVT proph - AC currently on hold for EGD  - Aspiration precautions   - Maintain O2 sat > 88%    - Incentive spirometer and acapella  ********************  4/26-stable resp status
Assessment/plan:    Right medial foot ulceration: Stable/noninfected  Xerosis plantarly bilateral  Diabetes mellitus    Recommend continued Decubitus precautions and Z flow boots.  Recommend Betadine paint and Allevyn foam dressing to right medial foot ulcer every other day.  Recommend daily use of moisturizer to foot  We will continue to monitor while in house for signs of infection and wound care recommendations.
71F extensive medical history including rectal adenocarcinoma s/p colectomy/colostomy, TBM s/p tracheobplasty, severe persistent asthma (Dupixent and steroids), bronchiectasis and chronic bronchitis, and dysphagia who presents with the sensation of food being stuck in her throat.    1. Dysphagia - sensation of food being stuck-likely due to eating in bed (position) and motility issues  - GI followup s/p EGD--s/p  esophagram=Abnormal esophageal motility. Manometry could be performed for further   evaluation as clinically warranted.  - Aspiration precautions    2. Severe persistent asthma - steroid dependent (also with adrenal insufficiency)  - no acute respiratory distress  - maintain O2 sat > 88%  - incentive spirometer and acapella to facilitate breathing. If sputum develops may benefit from chest vest  - Continue Medrol at home dose  - Continue Dupixent as an outpatient  - Patient has been treated with multiple bronchodilators in the past: can use Symbicort, Spiriva, Accolate, and Duonebs while hospitalized    3. Tracheobronchomalacia s/p tracheoplasty in the past with Dr. Zapien at [facility]    4. 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 Infectious Disease   - recent hospitalizations at [facility] and then [facility] in March 2022 with bacterial pneumonia treated with antibiotics  - currently appears nontoxic appearing   - currently on isolation for prior MRSA pneumonia/infection    6. GERD and dysphagia - PPI   - GI followup  7. Rhinitis-- Flonase  8. Cardiovascular - history of Afib with AC on hold  - Continue Mexilitene for prior PVCs             - Would suggest clarifying cardiac history  9. Prophylaxis  - DVT proph - AC currently on hold for EGD  - Aspiration precautions   - Maintain O2 sat > 88%    - Incentive spirometer and acapella  ********************  4/26-stable resp status  4/27-no new issues-await esophagram  4/28-esoph dysmotility noted
Impression:    Sacral/bilateral Buttocks deep tissue injury present on admission in evolution  Incontinence of bowel and bladder  Incontinence Dermatitis    Recommend:  1.) topical therapy: sacral/buttock injury - cleanse with incontinence cleanser, pat dry, apply medipaste to slough, cover with allevyn daily  2.) Incontinence Management - incontinence cleanser, pads, pericare BID, continue external female urinary catheter  3.) Maintain on an alternating air with low air loss surface  4.) Turn and reposition Q 2 hours  5.) Nutrition optimization  6.) Offload heels/feet with complete cair air fluidized boots; ensure that the soles of the feet are not resting on the foot board of the bed.  7.) Wound Care Podiatrists, Pj Hutton/Kate/Nicolas, will see patient for foot wounds    Care as per medicine. Will follow.  Upon discharge f/u as outpatient at [facility] [address] [place] [address] [phone]  Thank you for this consult  Olamide Lewis, NP-C, CWOCN [id number]
71 F w/  tracheobronchomalasia s/p tracheobronchoplasty, bronchiectasis, severe persistent asthma (steroid dependent), adrenal insuffiencey on chronic steroids, DM2, HTN, colorectal cancer s/p total colectomy now with colostomy, PAF on Eliquis,  recent hospitalizations for pneumonia, presents from  rehab ([facility] in [place])  for dysphagia  x 1 day  
71 F w/  tracheobronchomalasia s/p tracheobronchoplasty, bronchiectasis, severe persistent asthma (steroid dependent), adrenal insuffiencey on chronic steroids, DM2, HTN, colorectal cancer s/p total colectomy now with colostomy, PAF on Eliquis,  recent hospitalizations for pneumonia, presents from  rehab ([facility] in [place])  for dysphagia  x 1 day  
71 F w/  tracheobronchomalasia s/p tracheobronchoplasty, bronchiectasis, severe persistent asthma (steroid dependent), adrenal insuffiencey on chronic steroids, DM2, HTN, colorectal cancer s/p total colectomy now with colostomy, PAF on Eliquis,  recent hospitalizations for pneumonia, presents from  rehab ([facility] in [place])  for dysphagia  x 1 day  
71 F w/  tracheobronchomalasia s/p tracheobronchoplasty, bronchiectasis, severe persistent asthma (steroid dependent), adrenal insuffiencey on chronic steroids, DM2, HTN, colorectal cancer s/p total colectomy now with colostomy, PAF on Eliquis,  recent hospitalizations for pneumonia, presents from  rehab ([facility] in [place])  for dysphagia  x 1 day

## 2022-04-28 NOTE — DISCHARGE NOTE NURSING/CASE MANAGEMENT/SOCIAL WORK - NSDCPEFALRISK_GEN_ALL_CORE
For information on Fall & Injury Prevention, visit: [email or web address] OR  [email or web address] OR  https://www.cdc.gov/steadi/patient.html

## 2022-04-28 NOTE — PROGRESS NOTE ADULT - PROBLEM SELECTOR PLAN 1
EGD shows mild gastritis, no food impaction.  - speech and swallow done  - GI consult done - outpt manometric testing  - puree diet  - f/up esophagogram - no stricture or mass  - outpt motility study

## 2022-04-28 NOTE — PROGRESS NOTE ADULT - TIME BILLING
as above: resp stable--NO absolute  pulm contras to GI procedures-s/p esophagram= HH and esoph dysmotility  multifactorial dyspnea-TBM, CB, severe persistent asthma, prior PNA/bronchiectasis, debility, anemia, CAD--O2 NC sat above 90%  s/p ADMIT 3/2022 for PNA/bronchiectasis-f/up sputum cx-prior pseudomonas- (s/p ID formal evaln-RISHABH Liz/Girish et al)-s/p cefepime and        vanco for MRSA and pseudomonas as well as achromobacter and kleb- vancomycin to continued through 4/6--now stable    TBM-CB/brochiectasis-acapella/vest rx, incentive spirometry--utilize vest rx  asthma-medrol 8 mg (chronic dosing) , spiriva/symbicort, duoneb q 6, singulair 10 q hs, hypertonic saline  allergy-claritin/flonase                                 *****dysphonia/VC dysfunction-thrush-ENT evaln/swallow evaln -puree diet/asp precautions  ****gerd-sensation of globus s/p EGD; await esophagram;, aspiration precautions, protonix pre breakfast and pepcid 40 q hs; sx likely due to                positional eating in bed and dysmotility (confirmed)--? adding reglan to mix  abnormal CT-nodule-f/up 2 months                    cards-on mult rx-to continue  PT-OOB    DVT prophylaxis              Decub care                  ID-off ABX                         DC planning-when cleared     (all inhaled meds and eating must be done in chair-efficacy and asp. risk)    Eulalio Allison MD-Pulmonary   [phone].
as above: resp stable--NO absolute  pulm contras to GI procedures-esophagram pending  multifactorial dyspnea-TBM, CB, severe persistent asthma, prior PNA/bronchiectasis, debility, anemia, CAD--O2 NC sat above 90%  s/p ADMIT 3/2022 for PNA/bronchiectasis-f/up sputum cx-prior pseudomonas- (s/p ID formal evaln-RISHABH Liz/Girish et al)-s/p cefepime and        vanco for MRSA and pseudomonas as well as achromobacter and kleb- vancomycin to continued through 4/6--now stable    TBM-CB/brochiectasis-acapella/vest rx, incentive spirometry--utilize vest rx  asthma-medrol 8 mg (chronic dosing) , spiriva/symbicort, duoneb q 6, singulair 10 q hs, hypertonic saline  allergy-claritin/flonase                                 *****dysphonia/VC dysfunction-thrush-ENT evaln/swallow evaln -puree diet/asp precautions  ****gerd-sensation of globus s/p EGD; await esophagram;, aspiration precautions, protonix pre breakfast and add pepcid 40 q hs; sx likely due to                positional eating in bed and dysmotility.  abnormal CT-nodule-f/up 2 months                    cards-on mult rx-to continue  PT-OOB    DVT prophylaxis              Decub care                  ID-off ABX                         DC planning-when cleared     (all inhaled meds and eating must be done in chair-efficacy and asp. risk)    Eulalio Allison MD-Pulmonary   [phone].
as above: resp stable--NO absolute  pulm contras to GI procedures  multifactorial dyspnea-TBM, CB, severe persistent asthma, prior PNA/bronchiectasis, debility, anemia, CAD--O2 NC sat above 90%  s/p ADMIT 3/2022 for PNA/bronchiectasis-f/up sputum cx-prior pseudomonas- (s/p ID formal evaln-RISHABH Liz/Girish et al)-s/p cefepime and        vanco for MRSA and pseudomonas as well as achromobacter and kleb- vancomycin to continue through 4/6    TBM-CB/brochiectasis-acapella/vest rx, incentive spirometry--no need for fob for additional sputum--utilize vest rx  asthma-medrol 8 mg (chronic dosing) , spiriva/symbicort, duoneb q 6, singulair 10 q hs, hypertonic saline  allergy-claritin/flonase                                 *****dysphonia/VC dysfunction-thrush-ENT evaln/swallow evaln -puree diet/asp precautions  ****gerd-sensation of globus--for EGD today, aspiration precautions, protonix pre breakfast and add pepcid 40 q hs; sx likely due to positional eating in bed and dysmotility.  abnormal CT-nodule-f/up 2 months                    cards-on mult rx-to continue  PT-OOB    DVT prophylaxis              Decub care                  ID-off ABX                         ****preprocedure resp clearance-no absolute pulm contras to EGD etc.  DC planning-when cleared     (all inhaled meds and eating must be done in chair-efficacy and asp. risk)    Eulalio Allison MD-Pulmonary   [phone].

## 2022-04-28 NOTE — PROGRESS NOTE ADULT - PROBLEM SELECTOR PLAN 3
- adjust insulin dose daily as appropriate based on glucose levels.   - insulin correction scale  - check fsg qac/qhs
- 1/2 dose Lantus  while npo   -  hold mealtime  insulin while npo   - insulin correction scale  - check fsg qac/qhs

## 2022-04-28 NOTE — PATIENT PROFILE ADULT - FALL HARM RISK - HARM RISK INTERVENTIONS

## 2022-04-28 NOTE — PROGRESS NOTE ADULT - PROBLEM SELECTOR PLAN 5
on solumedrol 8 , will continue

## 2022-04-28 NOTE — DISCHARGE NOTE NURSING/CASE MANAGEMENT/SOCIAL WORK - NSDCVIVACCINE_GEN_ALL_CORE_FT
COVID-19, mRNA, LNP-S, PF, 100 mcg/ 0.5 mL dose (Moderna); 06-Dec-2021 17:12; Afshan Lew (RADHA); [employer] [id number] (Exp. Date: 22-Dec-2021); IntraMuscular; Deltoid Left.; 0.25 milliLiter(s);

## 2022-04-28 NOTE — PROGRESS NOTE ADULT - PROBLEM SELECTOR PLAN 2
- continue mexiletine  - continue Eliquis

## 2022-04-28 NOTE — CHART NOTE - NSCHARTNOTEFT_GEN_A_CORE
Wound Care Team Note:    Request for wound care consult for sacrum and heel skin injuries received from nursing. Attempted to see patient x2 this am. Patient off the floor at procedure. Will reattempt at a later time.    Olamide Lewis, ALLEY-C, CWOCN [id number]
71 y.o. was recently hospitalized for PNA and bronchiectasis exacerbation requiring intubation, recently discharged to rehab on 4/7. Hospital course notable for dysphonia, laryngoscopy with thrush - completed course of diflucan with resolution of thrush. Pt now presents from  rehab ([facility] in [place])  for dysphagia  x 1 day.  SWALLOW HISTORY: 3/31/22 FEES performed at [facility]: "The pharyngeal stage was characterized by trace penetration of minced and moist trial without immediate retrieval. Material eventually cleared from laryngeal vestibule with CUED multiple dry swallows. No instances of penetration or aspiration observed during the study with thin liquids or puree. Increase in pharyngeal residue with minced and moist trial vs. puree." Rec: Puree/thins    Hospital course:  4/24: GI note: Patient was doing well until the day of admission. She reports eating a meal of rice, chicken and snapper when she had a sensation of food getting stuck. She drank broth to queta it down x 2 but subsequently vomited the soup and bolus content. Following that, she reports resolution of her symptoms with only mild globus sensation remaining.    4/26: s/p EGD - S/p EGD with mild gastritis. No food in esophagus.  4/26: Initial bedside swallow completed. Pt presents with fair bolus prep and transport across consistencies. Mild delay in swallow initiation without overt signs or symptoms of penetration or aspiration across any trial. Pt reportedly intermittently sense globus sensation while pointing substernally, suspicious for an esophageal stage dysphagia. Given h/o dysphagia and concern for episodic food impaction, would defer to GI for further assessment, (i.e. esophagram?). Cannot subjectively upgrade pt to solids given h/o penetration with minced/moist solids on recent FEES.    4/27: Esophagram: IMPRESSION:  Small hiatal hernia with trace gastroesophageal reflux. There is decreased primary peristalsis with nonperistaltic contractions and a few tertiary waves. No evidence of mechanical stricture or mass. Abnormal esophageal motility. Manometry could be performed for further evaluation as clinically warranted. The patient swallowed a barium pill which held up a bit at the GE junction, but eventually passed.     4/28: Pt seen bedside today for re-evaluation of swallow mechanism. Pt encountered AA&Ox4, sitting upright in bed. Pt reports consuming scrambled eggs this morning and feeling a globus sensation while pointing above the stomach with eventual vomiting post consumption. Pt denies any difficulty with thin liquids. Pt observed tolerating thin liquids without overt signs or symptoms of penetration or aspiration. Given recent vomiting, pt refused to trial solids at this time. Given h/o dysphagia with solids and now c/o globus sensation in ?esophagus with solids, would suggest continuing current diet of purees and thin liquids, as tolerated. Offered an instrumental examination to further assess pharyngeal stage of swallow, howeever, pt declined at this time.  Defer to GI/team re: esophageal phase of swallow. Pt concerned re: losing rehab bed and requested to return to rehab today on current pureed diet with f/u as an outpatient. Purposeful proactive rounding reinforced and 5Ps addressed.     Impression: Pt continue to tolerate thin liquids without overt signs or symptoms of penetration or aspiration. Pt c/o globus sensation in ?esophagus after consumption of solids. Would suggest continuation of pureed diet. Pt offered an instrumental exam of swallow and declined at this time.    Recommendations:  1) Pureed diet, as tolerated  2) Aspiration precautions: small bites/sips; slow rate; alternate food and liquids; crush medications and place in applesauce; remain upright during and at least 30 min post meal  3) Maintain good oral hygiene  4) Aware of plan for d/c to rehab later today. Pt may obtain an MBS as an outpatient to further assess pharyngeal stage of swallow    Discussed findings with dian Sawant; Pt; Dr. Tommie Ballard M.A. CCC-SLP  Speech-Language Pathologist  Pgr # [phone]
S/p EGD with mild gastritis. No food in esophagus. No objection to outpatient follow up.  - diet as tolerated  - can follow up biopsies as outpatient  - please have patient make appointment for follow up in the GI office ([facility]- [phone], Attending Clinic- [phone])

## 2022-04-28 NOTE — PATIENT PROFILE ADULT - DOES PATIENT HAVE ADVANCE DIRECTIVE
Pharmacist Admission Medication Reconciliation Pending Note    Prior to Admission Medications were reviewed by the pharmacist and pended for provider review during admission medication reconciliation.    Medications were pended by the pharmacist at this time as follows:    Pended Admission Order Reconciliation Actions - Deedee Estrada [facility] 8/14/2021 10:55 AM     Order Name Action Reordered As    Omega-3 Fatty Acids (FISH OIL) 1000 MG CAPSULE DELAYED RELEASE Do Not Order for Admission     Multiple Vitamins-Minerals (MULTIVITAMIN ADULT) Chew Tab Do Not Order for Admission     cetirizine (ZYRTEC) 10 MG tablet Order for Admission cetirizine (ZyrTEC) tablet 10 mg    budesonide-formoterol (SYMBICORT) 160-4.5 MCG/ACT inhaler Order for Admission budesonide-formoterol (SYMBICORT) 160-4.5 MCG/ACT inhaler 2 puff    montelukast (SINGULAIR) 10 MG tablet Order for Admission montelukast (SINGULAIR) tablet 10 mg    diclofenac (VOLTAREN) 1 % gel Do Not Order for Admission     lactobacillus acidophilus (BACID) Order for Admission lactobacillus acidophilus (BACID) tablet 1 tablet    Ascorbic Acid (VITAMIN C) 500 MG tablet Order for Admission ascorbic acid (VITAMIN C) tablet 250 mg    nystatin (MYCOSTATIN) 002813 UNIT/GM powder Do Not Order for Admission     oxybutynin (DITROPAN) 5 MG tablet Order for Admission oxybutynin (DITROPAN) tablet 10 mg    triamcinolone (ARISTOCORT) 0.1 % cream Do Not Order for Admission     urea 10 % lotion Do Not Order for Admission     zinc oxide 20 % ointment Do Not Order for Admission             Orders Pended To Continue For Hospital Stay     ID Description Pended By When Reason    [id number] ascorbic acid (VITAMIN C) tablet 250 mg-2 TIMES DAILY Deedee Estrada, [facility] 08/14/21 1055     [id number] budesonide-formoterol (SYMBICORT) 160-4.5 MCG/ACT inhaler 2 puff-2 TIMES DAILY Deedee Estrada, [facility] 08/14/21 1055     [id number] cetirizine (ZyrTEC) tablet 10 [facility]ILY [facility] 10[facility]      [id number] lactobacillus acidophilus (BACID) tablet 1 tablet-[facility]ES [facility] 10[facility]     [id number] montelukast (SINGULAIR) tablet 10 mg-EVERY EVENING [facility] 1055     [id number] oxybutynin (DITROPAN) tablet 10 mg-3 TIMES DAILY [facility] 1055             Pharmacist Notations:           Orders that are ultimately reconciled and signed during admission medication reconciliation may differ from the pended actions above.    Please contact the pharmacist for questions.    KIMO ESTRADA [facility]  8/14/2021 10:55 AM   No

## 2022-04-28 NOTE — PROGRESS NOTE ADULT - SUBJECTIVE AND OBJECTIVE BOX
Patient is a 71y old  Female who presents with a chief complaint of dysphagia x 1 day (28 Apr 2022 05:20)      INTERVAL History of Present Illness/OVERNIGHT EVENTS: declines further inpatient work up.  GI recommends outpt manometry testing  speech recommends puree diet  prefers outpt Podiatry follow up  does not wish to lose her rehab bed    MEDICATIONS  (STANDING):  apixaban 5 milliGRAM(s) Oral every 12 hours  atorvastatin 20 milliGRAM(s) Oral at bedtime  chlorhexidine 2% Cloths 1 Application(s) Topical daily  dextrose 5%. 1000 milliLiter(s) (50 mL/Hr) IV Continuous <Continuous>  dextrose 5%. 1000 milliLiter(s) (100 mL/Hr) IV Continuous <Continuous>  dextrose 50% Injectable 25 Gram(s) IV Push once  dextrose 50% Injectable 12.5 Gram(s) IV Push once  fluticasone propionate 50 MICROgram(s)/spray Nasal Spray 1 Spray(s) Both Nostrils two times a day  glucagon  Injectable 1 milliGRAM(s) IntraMuscular once  insulin glargine Injectable (LANTUS) 10 Unit(s) SubCutaneous at bedtime  insulin lispro (ADMELOG) corrective regimen sliding scale   SubCutaneous three times a day before meals  insulin lispro (ADMELOG) corrective regimen sliding scale   SubCutaneous at bedtime  insulin lispro Injectable (ADMELOG) 4 Unit(s) SubCutaneous three times a day before meals  lactulose Syrup 15 Gram(s) Oral daily  lidocaine 4% Injection for Nebulization 4 milliLiter(s) Nebulizer once  methylPREDNISolone 8 milliGRAM(s) Oral daily  mexiletine 200 milliGRAM(s) Oral three times a day  ondansetron Injectable 2 milliGRAM(s) IV Push once  pantoprazole    Tablet 40 milliGRAM(s) Oral before breakfast  polyethylene glycol 3350 17 Gram(s) Oral two times a day  pregabalin 150 milliGRAM(s) Oral two times a day  senna 2 Tablet(s) Oral at bedtime  tiotropium 18 MICROgram(s) Capsule 1 Capsule(s) Inhalation daily    MEDICATIONS  (PRN):  acetaminophen     Tablet .. 650 milliGRAM(s) Oral every 6 hours PRN Temp greater or equal to 38C (100.4F), Mild Pain (1 - 3)  albuterol/ipratropium for Nebulization 3 milliLiter(s) Nebulizer every 6 hours PRN Shortness of Breath and/or Wheezing  aluminum hydroxide/magnesium hydroxide/simethicone Suspension 30 milliLiter(s) Oral every 4 hours PRN Dyspepsia  dextrose Oral Gel 15 Gram(s) Oral once PRN Blood Glucose LESS THAN 70 milliGRAM(s)/deciliter      Allergies    aspirin (Short breath)  Avelox (Short breath; Pruritus)  codeine (Short breath)  Dilaudid (Short breath)  iodine (Short breath; Swelling)  penicillin (Unknown)  shellfish (Anaphylaxis)  tetanus toxoid (Short breath)  Valium (Short breath)    Intolerances        REVIEW OF SYSTEMS: bedbound  Negative unless otherwise specified above.    Vital Signs Last 24 Hrs  T(C): 36.6 (28 Apr 2022 10:05), Max: 36.7 (27 Apr 2022 17:52)  T(F): 97.8 (28 Apr 2022 10:05), Max: 98.1 (27 Apr 2022 17:52)  HR: 77 (28 Apr 2022 10:05) (64 - 86)  BP: 147/66 (28 Apr 2022 10:05) (126/61 - 147/66)  BP(mean): --  RR: 18 (28 Apr 2022 10:05) (18 - 18)  SpO2: 97% (28 Apr 2022 10:05) (97% - 100%)        PHYSICAL EXAM:  GENERAL: No apparent distress, appears stated age  HEAD:  Atraumatic, Normocephalic  EYES: Conjunctiva and sclera clear, no discharge  ENMT: Moist mucous membranes, no nasal discharge  NECK: Supple, no JVD  CHEST/LUNG: Scattered rhonchi  HEART: Regular rhythm, no rubs or gallops  ABDOMEN: soft, NT ND  EXTREMITIES:  No clubbing, cyanosis or edema  SKIN: No rash, no new discoloration, DTI sacrum  NERVOUS SYSTEM:  Alert & Oriented; Bilateral Lower extremity mobile, sensation to light touch       LABS:                        10.1   7.79  )-----------( 285      ( 28 Apr 2022 07:19 )             35.0     28 Apr 2022 07:18    142    |  105    |  6      ----------------------------<  252    3.4     |  28     |  0.37     Ca    8.3        28 Apr 2022 07:18          CAPILLARY BLOOD GLUCOSE      POCT Blood Glucose.: 211 mg/dL (28 Apr 2022 12:28)  POCT Blood Glucose.: 222 mg/dL (28 Apr 2022 08:25)  POCT Blood Glucose.: 139 mg/dL (27 Apr 2022 21:30)  POCT Blood Glucose.: 251 mg/dL (27 Apr 2022 17:32)  POCT Blood Glucose.: 168 mg/dL (27 Apr 2022 15:07)      RADIOLOGY & ADDITIONAL TESTS:      Images reviewed personally    Consultant Notes Reviewed and Care Discussed with relevant Consultants.
Wound Surgery Consult Note:    HPI:  71 year old female with past medical history significant for  tracheobronchomalasia s/p tracheobronchoplasty, bronchiectasis, severe persistent asthma (steroid dependent), adrenal insuffiencey on chronic steroids, DM2, HTN, colorectal cancer s/p total colectomy now with colostomy, PAF on Eliquis, 2 recent hospitalizations for pneumonia and bronchiectasis exacerbation requiring intubation most recently discharged to rehab ([facility] in [place]) on 4/7 presenting with sensation of  food stuck in throat for the past day.   Patient reports feeling as if food was stuck in her throat after eating some rice and chicken, she tried to queta it down with soup , but ended up vomiting it up.  She reports residual chest discomfort , but not spitting up anything else and swallowing her saliva. She reports no nausea , no abdominal pain , she has normal ostomy output , is passing gas . She reports no difficulty breathing , no wheezing.   She has been on pureed diet recently switched to soft foods. Per family,  she had mesh placed near the epiglottis due to frequent aspiration (24 Apr 2022 23:52)    Request for wound care consult for sacral/bilateral buttocks skin breakdown received from nursing. Ms. Romy Schroeder was encountered on an alternating air with low air loss surface. She is grossly incontinent of stool and urine. She was able turn and reposition self in bed although she is weak. Her extreme immobility, inactivity, incontinence of urine and stool as well as poor nutritional status all contribute to her high risk for pressure injury development and hinder healing. Identification of the initial signs of deep tissue damage at the level of the skin within 72 hours of admission make this an injury that occurred prior to admission.    PAST MEDICAL & SURGICAL HISTORY:  Atrial fibrillation, paroxysmal, on eliquis  Diabetes, Type 2  COPD (chronic obstructive pulmonary disease)  Adrenal insufficiency, Medrol daily for over 50 years  Aortic insufficiency, moderate AR on echo 5/3/2018  Pelvic fracture  Asthma  Tracheobronchomalacia  diagnosed 2015, s/p bronchial thermoplasty 2016 (Dr Zapien); recent bronchoscopy 6/5/2018 revealed no evidence of tracheobronchomalacia in trachea or bronchial tubes  Colorectal cancer, 4/2018- last treatment , chemo and radiation  Rectal bleeding  Seizure, x 1 1/7/18  DVT (deep venous thrombosis), 15-20 years ago, took coumadin  TIA (transient ischemic attack), multiple, last 5 years ago - presents as right-sided weakness  History of partial hysterectomy, 30 years ago - fibroids  H/O total knee replacement, bilateral, 5 years ago  History of sinus surgery, multiple sinus surgeries  Exostosis of orbit, left, 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  H/O pelvic surgery, 5 years ago - s/p fracture  History of tracheomalacia  2015 - attempted tracheal stenting ([facility])- course complicated by obstruction, respiratory failure, multiple CPR attempts -  stent discontinued; 10/20/2016 Tracheobronchoplasty (Prolene Mesh) performed at [facility] by Dr Zapien  S/P bronchoscopy, 6/5/2018 - [facility] (Dr Zapien) no evidence of tracheobronchomalacia in trachea or bronchial tubes  Rectal bleeding, exam under anesthesia ([facility]) 2/2018    MEDICATIONS  (STANDING):  atorvastatin 20 milliGRAM(s) Oral at bedtime  dextrose 5%. 1000 milliLiter(s) (50 mL/Hr) IV Continuous <Continuous>  dextrose 5%. 1000 milliLiter(s) (100 mL/Hr) IV Continuous <Continuous>  dextrose 50% Injectable 25 Gram(s) IV Push once  dextrose 50% Injectable 12.5 Gram(s) IV Push once  fluticasone propionate 50 MICROgram(s)/spray Nasal Spray 1 Spray(s) Both Nostrils two times a day  glucagon  Injectable 1 milliGRAM(s) IntraMuscular once  insulin lispro (ADMELOG) corrective regimen sliding scale   SubCutaneous every 6 hours  lactulose Syrup 15 Gram(s) Oral daily  lidocaine 4% Injection for Nebulization 4 milliLiter(s) Nebulizer once  methylPREDNISolone 8 milliGRAM(s) Oral daily  mexiletine 200 milliGRAM(s) Oral three times a day  ondansetron Injectable 2 milliGRAM(s) IV Push once  pantoprazole    Tablet 40 milliGRAM(s) Oral before breakfast  polyethylene glycol 3350 17 Gram(s) Oral two times a day  potassium chloride  10 mEq/100 mL IVPB 10 milliEquivalent(s) IV Intermittent every 1 hour  pregabalin 150 milliGRAM(s) Oral two times a day  senna 2 Tablet(s) Oral at bedtime  sodium chloride 0.9%. 1000 milliLiter(s) (20 mL/Hr) IV Continuous <Continuous>  sodium chloride 0.9%. 500 milliLiter(s) (30 mL/Hr) IV Continuous <Continuous>  tiotropium 18 MICROgram(s) Capsule 1 Capsule(s) Inhalation daily    MEDICATIONS  (PRN):  acetaminophen     Tablet .. 650 milliGRAM(s) Oral every 6 hours PRN Temp greater or equal to 38C (100.4F), Mild Pain (1 - 3)  albuterol/ipratropium for Nebulization 3 milliLiter(s) Nebulizer every 6 hours PRN Shortness of Breath and/or Wheezing  aluminum hydroxide/magnesium hydroxide/simethicone Suspension 30 milliLiter(s) Oral every 4 hours PRN Dyspepsia  dextrose Oral Gel 15 Gram(s) Oral once PRN Blood Glucose LESS THAN 70 milliGRAM(s)/deciliter    Allergies    aspirin (Short breath)  Avelox (Short breath; Pruritus)  codeine (Short breath)  Dilaudid (Short breath)  iodine (Short breath; Swelling)  penicillin (Unknown)  shellfish (Anaphylaxis)  tetanus toxoid (Short breath)  Valium (Short breath)    Intolerances    Vital Signs Last 24 Hrs  T(C): 36.6 (26 Apr 2022 09:42), Max: 36.9 (25 Apr 2022 13:57)  T(F): 97.8 (26 Apr 2022 09:16), Max: 98.5 (25 Apr 2022 13:57)  HR: 83 (26 Apr 2022 10:58) (65 - 94)  BP: 127/75 (26 Apr 2022 10:58) (113/72 - 142/61)  BP(mean): --  RR: 20 (26 Apr 2022 10:58) (17 - 20)  SpO2: 97% (26 Apr 2022 10:58) (94% - 100%)    Physical Exam:  General: Alert, weak  Respiratory: no SOB on room air  Gastrointestinal: soft NT/ND  Neurology: weakened strength & sensation grossly intact  Musculoskeletal: no contractures  Vascular: BLE edema equal, +Pedal pulses manually palpated  Skin:  Sacral/bilateral buttocks with deep maroon discoloration and central area of erosion L 3cm X W 1.5cm xD 0.2cm with pink wound bed 30% with 70% yellow slough, and scant serosanguinous drainage, +TTP, No odor, erythema, increased warmth, induration, fluctuance    LABS:  04-26    145  |  104  |  9   ----------------------------<  89  3.4<L>   |  26  |  0.40<L>    Ca    8.7      26 Apr 2022 07:47    TPro  5.5<L>  /  Alb  2.9<L>  /  TBili  0.2  /  DBili  x   /  AST  12  /  ALT  12  /  AlkPhos  65  04-26                          10.2   7.91  )-----------( 270      ( 26 Apr 2022 07:47 )             34.4     PT/INR - ( 24 Apr 2022 23:38 )   PT: 13.9 sec;   INR: 1.21 ratio         PTT - ( 24 Apr 2022 23:38 )  PTT:30.1 sec      
CHIEF COMPLAINT: f/up sob, chronic resp failure, TBM, severe asthma, allergic rhinitis-breathing better -no new chest sx    Interval Events: EGD by report normal, for esophagram today    REVIEW OF SYSTEMS:  Constitutional: No fevers or chills. No weight loss. No fatigue or generalized malaise.  Eyes: No itching or discharge from the eyes  ENT: No ear pain. No ear discharge. No nasal congestion. No post nasal drip. No epistaxis. No throat pain. No sore throat. No difficulty swallowing.   CV: No chest pain. No palpitations. No lightheadedness or dizziness.   Resp: No dyspnea at rest. No dyspnea on exertion. No orthopnea. No wheezing. + cough. No stridor. No sputum production. No chest pain with respiration.  GI: No nausea. No vomiting. No diarrhea.  MSK: No joint pain or pain in any extremities  Integumentary: No skin lesions. No pedal edema.  Neurological: + gross motor weakness. No sensory changes.  [ +] All other systems negative  [ ] Unable to assess ROS because ________    OBJECTIVE:  ICU Vital Signs Last 24 Hrs  T(C): 36.9 (27 Apr 2022 01:23), Max: 37.2 (26 Apr 2022 12:30)  T(F): 98.5 (27 Apr 2022 01:23), Max: 99 (26 Apr 2022 12:30)  HR: 68 (27 Apr 2022 01:23) (68 - 102)  BP: 117/52 (27 Apr 2022 01:23) (115/62 - 156/98)  BP(mean): --  ABP: --  ABP(mean): --  RR: 18 (27 Apr 2022 01:23) (17 - 20)  SpO2: 96% (27 Apr 2022 01:23) (95% - 100%)        04-25 @ 07:01  -  04-26 @ 07:00  --------------------------------------------------------  IN: 240 mL / OUT: 1300 mL / NET: -1060 mL    04-26 @ 07:01  -  04-27 @ 04:51  --------------------------------------------------------  IN: 900 mL / OUT: 701 mL / NET: 199 mL      CAPILLARY BLOOD GLUCOSE      POCT Blood Glucose.: 257 mg/dL (26 Apr 2022 21:44)      PHYSICAL EXAM: NAD in bed on RA  General: Awake, alert, oriented X 3.   HEENT: Atraumatic, normocephalic.                 Mallampatti Grade 2                No nasal congestion.                No tonsillar or pharyngeal exudates.  Lymph Nodes: No palpable lymphadenopathy  Neck: No JVD. No carotid bruit.   Respiratory: Normal chest expansion                         Normal percussion                         Normal and equal air entry                         mild exp wheeze,no rhonchi or rales.  Cardiovascular: S1 S2 normal. No murmurs, rubs or gallops.   Abdomen: Soft, non-tender, non-distended. No organomegaly. Normoactive bowel sounds.  Extremities: Warm to touch. Peripheral pulse palpable. No pedal edema.   Skin: No rashes or skin lesions  Neurological: Motor and sensory examination equal and normal in all four extremities.  Psychiatry: Appropriate mood and affect.    HOSPITAL MEDICATIONS:  MEDICATIONS  (STANDING):  apixaban 5 milliGRAM(s) Oral every 12 hours  atorvastatin 20 milliGRAM(s) Oral at bedtime  dextrose 5%. 1000 milliLiter(s) (50 mL/Hr) IV Continuous <Continuous>  dextrose 5%. 1000 milliLiter(s) (100 mL/Hr) IV Continuous <Continuous>  dextrose 50% Injectable 25 Gram(s) IV Push once  dextrose 50% Injectable 12.5 Gram(s) IV Push once  fluticasone propionate 50 MICROgram(s)/spray Nasal Spray 1 Spray(s) Both Nostrils two times a day  glucagon  Injectable 1 milliGRAM(s) IntraMuscular once  insulin glargine Injectable (LANTUS) 10 Unit(s) SubCutaneous at bedtime  insulin lispro (ADMELOG) corrective regimen sliding scale   SubCutaneous three times a day before meals  insulin lispro (ADMELOG) corrective regimen sliding scale   SubCutaneous at bedtime  insulin lispro Injectable (ADMELOG) 4 Unit(s) SubCutaneous three times a day before meals  lactulose Syrup 15 Gram(s) Oral daily  lidocaine 4% Injection for Nebulization 4 milliLiter(s) Nebulizer once  methylPREDNISolone 8 milliGRAM(s) Oral daily  mexiletine 200 milliGRAM(s) Oral three times a day  ondansetron Injectable 2 milliGRAM(s) IV Push once  pantoprazole    Tablet 40 milliGRAM(s) Oral before breakfast  polyethylene glycol 3350 17 Gram(s) Oral two times a day  pregabalin 150 milliGRAM(s) Oral two times a day  senna 2 Tablet(s) Oral at bedtime  sodium chloride 0.9%. 500 milliLiter(s) (30 mL/Hr) IV Continuous <Continuous>  tiotropium 18 MICROgram(s) Capsule 1 Capsule(s) Inhalation daily    MEDICATIONS  (PRN):  acetaminophen     Tablet .. 650 milliGRAM(s) Oral every 6 hours PRN Temp greater or equal to 38C (100.4F), Mild Pain (1 - 3)  albuterol/ipratropium for Nebulization 3 milliLiter(s) Nebulizer every 6 hours PRN Shortness of Breath and/or Wheezing  aluminum hydroxide/magnesium hydroxide/simethicone Suspension 30 milliLiter(s) Oral every 4 hours PRN Dyspepsia  dextrose Oral Gel 15 Gram(s) Oral once PRN Blood Glucose LESS THAN 70 milliGRAM(s)/deciliter      LABS:                        10.2   7.91  )-----------( 270      ( 26 Apr 2022 07:47 )             34.4     04-26    145  |  104  |  9   ----------------------------<  89  3.4<L>   |  26  |  0.40<L>    Ca    8.7      26 Apr 2022 07:47    TPro  5.5<L>  /  Alb  2.9<L>  /  TBili  0.2  /  DBili  x   /  AST  12  /  ALT  12  /  AlkPhos  65  04-26              MICROBIOLOGY:     RADIOLOGY:  [ ] Reviewed and interpreted by me    Point of Care Ultrasound Findings:    PFT:    EKG:
Podiatry pager #: [phone]    Patient is a 71y old  Female who presents with a chief complaint of dysphagia x 1 day (27 Apr 2022 04:50) Podiatry consult for right foot wounds      HPI:  71 year old female with past medical history significant for  tracheobronchomalasia s/p tracheobronchoplasty, bronchiectasis, severe persistent asthma (steroid dependent), adrenal insuffiencey on chronic steroids, DM2, HTN, colorectal cancer s/p total colectomy now with colostomy, PAF on Eliquis, 2 recent hospitalizations for pneumonia and bronchiectasis exacerbation requiring intubation most recently discharged to rehab ([facility] in [place]) on 4/7 presenting with sensation of  food stuck in throat for the past day.   Patient reports feeling as if food was stuck in her throat after eating some rice and chicken, she tried to queta it down with soup , but ended up vomiting it up.  She reports residual chest discomfort , but not spitting up anything else and swallowing her saliva. She reports no nausea , no abdominal pain , she has normal ostomy output , is passing gas . She reports no difficulty breathing , no wheezing.   She has been on pureed diet recently switched to soft foods. Per family,  she had mesh placed near the epiglottis due to frequent aspiration (24 Apr 2022 23:52)      PAST MEDICAL & SURGICAL HISTORY:  Atrial fibrillation  paroxysmal, on eliquis    Diabetes  Type 2    COPD (chronic obstructive pulmonary disease)    Adrenal insufficiency  Medrol daily for over 50 years    Aortic insufficiency  moderate AR on echo 5/3/2018    Pelvic fracture    Asthma    Tracheobronchomalacia  diagnosed 2015, s/p bronchial thermoplasty 2016 (Dr Zapien); recent bronchoscopy 6/5/2018 revealed no evidence of tracheobronchomalacia in trachea or bronchial tubes    Colorectal cancer  4/2018- last treatment , chemo and radiation    Rectal bleeding    Seizure  x 1 1/7/18    DVT (deep venous thrombosis)  15-20 years ago, took coumadin    TIA (transient ischemic attack)  multiple, last 5 years ago - presents as right-sided weakness    History of partial hysterectomy  30 years ago - fibroids    H/O total knee replacement, bilateral  5 years ago    History of sinus surgery  multiple sinus surgeries    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    H/O pelvic surgery  5 years ago - s/p fracture    History of tracheomalacia  2015 - attempted tracheal stenting ([facility])- course complicated by obstruction, respiratory failure, multiple CPR attempts -  stent discontinued; 10/20/2016 Tracheobronchoplasty (Prolene Mesh) performed at [facility] by Dr Zapien    S/P bronchoscopy  6/5/2018 - [place] [facility] (Dr Zapien) no evidence of tracheobronchomalacia in trachea or bronchial tubes    Rectal bleeding  exam under anesthesia ([facility]) 2/2018        MEDICATIONS  (STANDING):  apixaban 5 milliGRAM(s) Oral every 12 hours  atorvastatin 20 milliGRAM(s) Oral at bedtime  dextrose 5%. 1000 milliLiter(s) (50 mL/Hr) IV Continuous <Continuous>  dextrose 5%. 1000 milliLiter(s) (100 mL/Hr) IV Continuous <Continuous>  dextrose 50% Injectable 25 Gram(s) IV Push once  dextrose 50% Injectable 12.5 Gram(s) IV Push once  fluticasone propionate 50 MICROgram(s)/spray Nasal Spray 1 Spray(s) Both Nostrils two times a day  glucagon  Injectable 1 milliGRAM(s) IntraMuscular once  insulin glargine Injectable (LANTUS) 10 Unit(s) SubCutaneous at bedtime  insulin lispro (ADMELOG) corrective regimen sliding scale   SubCutaneous three times a day before meals  insulin lispro (ADMELOG) corrective regimen sliding scale   SubCutaneous at bedtime  insulin lispro Injectable (ADMELOG) 4 Unit(s) SubCutaneous three times a day before meals  lactulose Syrup 15 Gram(s) Oral daily  lidocaine 4% Injection for Nebulization 4 milliLiter(s) Nebulizer once  methylPREDNISolone 8 milliGRAM(s) Oral daily  mexiletine 200 milliGRAM(s) Oral three times a day  ondansetron Injectable 2 milliGRAM(s) IV Push once  pantoprazole    Tablet 40 milliGRAM(s) Oral before breakfast  polyethylene glycol 3350 17 Gram(s) Oral two times a day  pregabalin 150 milliGRAM(s) Oral two times a day  senna 2 Tablet(s) Oral at bedtime  tiotropium 18 MICROgram(s) Capsule 1 Capsule(s) Inhalation daily    MEDICATIONS  (PRN):  acetaminophen     Tablet .. 650 milliGRAM(s) Oral every 6 hours PRN Temp greater or equal to 38C (100.4F), Mild Pain (1 - 3)  albuterol/ipratropium for Nebulization 3 milliLiter(s) Nebulizer every 6 hours PRN Shortness of Breath and/or Wheezing  aluminum hydroxide/magnesium hydroxide/simethicone Suspension 30 milliLiter(s) Oral every 4 hours PRN Dyspepsia  dextrose Oral Gel 15 Gram(s) Oral once PRN Blood Glucose LESS THAN 70 milliGRAM(s)/deciliter      Allergies    aspirin (Short breath)  Avelox (Short breath; Pruritus)  codeine (Short breath)  Dilaudid (Short breath)  iodine (Short breath; Swelling)  penicillin (Unknown)  shellfish (Anaphylaxis)  tetanus toxoid (Short breath)  Valium (Short breath)    Intolerances        VITALS:    Vital Signs Last 24 Hrs  T(C): 37.1 (27 Apr 2022 06:35), Max: 37.2 (26 Apr 2022 12:30)  T(F): 98.7 (27 Apr 2022 06:35), Max: 99 (26 Apr 2022 12:30)  HR: 72 (27 Apr 2022 06:35) (68 - 102)  BP: 123/56 (27 Apr 2022 06:35) (115/62 - 156/98)  BP(mean): --  RR: 18 (27 Apr 2022 06:35) (17 - 20)  SpO2: 97% (27 Apr 2022 06:35) (96% - 100%)    LABS:                          9.9    7.32  )-----------( 270      ( 27 Apr 2022 07:14 )             34.4       04-27    142  |  106  |  8   ----------------------------<  301<H>  3.9   |  25  |  0.35<L>    Ca    8.3<L>      27 Apr 2022 07:14    TPro  5.5<L>  /  Alb  2.9<L>  /  TBili  0.2  /  DBili  x   /  AST  12  /  ALT  12  /  AlkPhos  65  04-26      CAPILLARY BLOOD GLUCOSE      POCT Blood Glucose.: 257 mg/dL (26 Apr 2022 21:44)  POCT Blood Glucose.: 238 mg/dL (26 Apr 2022 17:38)  POCT Blood Glucose.: 186 mg/dL (26 Apr 2022 12:12)          LOWER EXTREMITY PHYSICAL EXAM:    Vasular: DP/PT _1/4, B/L, CFT <_2 seconds B/L, Temperature gradient _WNL, B/L.   Neuro: Epicritic sensation Diminished_ to the level of _Toes, B/L.  Skin: Xerosis plantar aspect of both feet  Wound #1:   Location: Right medial foot overlying navicular  Size:1 cm diameter  Depth: Superficial  Wound bed: Eschar  Drainage: Dry/no fluctuance  Odor: None  Periwound: No Clinical signs of infection  Etiology: Present on admission/diabetes    RADIOLOGY & ADDITIONAL STUDIES:    
CHIEF COMPLAINT: f/up sob, chronic resp failure, TBM, severe asthma, allergic rhinitis-breathing well-eating better (OOB)    Interval Events: esophagram-Abnormal esophageal motility. Manometry could be performed for further   evaluation as clinically warranted.      REVIEW OF SYSTEMS:  Constitutional: No fevers or chills. No weight loss. No fatigue or generalized malaise.  Eyes: No itching or discharge from the eyes  ENT: No ear pain. No ear discharge. No nasal congestion. No post nasal drip. No epistaxis. No throat pain. No sore throat. No difficulty swallowing.   CV: No chest pain. No palpitations. No lightheadedness or dizziness.   Resp: No dyspnea at rest. No dyspnea on exertion. No orthopnea. No wheezing. No cough. No stridor. No sputum production. No chest pain with respiration.  GI: No nausea. No vomiting. No diarrhea.  MSK: No joint pain or pain in any extremities  Integumentary: No skin lesions. No pedal edema.  Neurological: + gross motor weakness. No sensory changes.  [+ ] All other systems negative  [ ] Unable to assess ROS because ________    OBJECTIVE:  ICU Vital Signs Last 24 Hrs  T(C): 36.5 (28 Apr 2022 00:35), Max: 37.1 (27 Apr 2022 06:35)  T(F): 97.7 (28 Apr 2022 00:35), Max: 98.7 (27 Apr 2022 06:35)  HR: 64 (28 Apr 2022 00:35) (63 - 86)  BP: 131/55 (28 Apr 2022 00:35) (123/56 - 137/70)  BP(mean): --  ABP: --  ABP(mean): --  RR: 18 (28 Apr 2022 00:35) (18 - 18)  SpO2: 100% (28 Apr 2022 00:35) (92% - 100%)        04-26 @ 07:01  -  04-27 @ 07:00  --------------------------------------------------------  IN: 900 mL / OUT: 1251 mL / NET: -351 mL    04-27 @ 07:01 - 04-28 @ 05:20  --------------------------------------------------------  IN: 540 mL / OUT: 252 mL / NET: 288 mL      CAPILLARY BLOOD GLUCOSE      POCT Blood Glucose.: 139 mg/dL (27 Apr 2022 21:30)      PHYSICAL EXAM: NAD in bed on RA  General: Awake, alert, oriented X 3.   HEENT: Atraumatic, normocephalic.                 Mallampatti Grade 2                No nasal congestion.                No tonsillar or pharyngeal exudates.  Lymph Nodes: No palpable lymphadenopathy  Neck: No JVD. No carotid bruit.   Respiratory: Normal chest expansion                         Normal percussion                         Normal and equal air entry                         No wheeze, rhonchi or rales.  Cardiovascular: S1 S2 normal. No murmurs, rubs or gallops.   Abdomen: Soft, non-tender, non-distended. No organomegaly. Normoactive bowel sounds.  Extremities: Warm to touch. Peripheral pulse palpable. No pedal edema.   Skin: No rashes or skin lesions  Neurological: Motor and sensory examination equal and normal in all four extremities.  Psychiatry: Appropriate mood and affect.    HOSPITAL MEDICATIONS:  MEDICATIONS  (STANDING):  apixaban 5 milliGRAM(s) Oral every 12 hours  atorvastatin 20 milliGRAM(s) Oral at bedtime  chlorhexidine 2% Cloths 1 Application(s) Topical daily  dextrose 5%. 1000 milliLiter(s) (50 mL/Hr) IV Continuous <Continuous>  dextrose 5%. 1000 milliLiter(s) (100 mL/Hr) IV Continuous <Continuous>  dextrose 50% Injectable 25 Gram(s) IV Push once  dextrose 50% Injectable 12.5 Gram(s) IV Push once  fluticasone propionate 50 MICROgram(s)/spray Nasal Spray 1 Spray(s) Both Nostrils two times a day  glucagon  Injectable 1 milliGRAM(s) IntraMuscular once  insulin glargine Injectable (LANTUS) 10 Unit(s) SubCutaneous at bedtime  insulin lispro (ADMELOG) corrective regimen sliding scale   SubCutaneous three times a day before meals  insulin lispro (ADMELOG) corrective regimen sliding scale   SubCutaneous at bedtime  insulin lispro Injectable (ADMELOG) 4 Unit(s) SubCutaneous three times a day before meals  lactulose Syrup 15 Gram(s) Oral daily  lidocaine 4% Injection for Nebulization 4 milliLiter(s) Nebulizer once  methylPREDNISolone 8 milliGRAM(s) Oral daily  mexiletine 200 milliGRAM(s) Oral three times a day  ondansetron Injectable 2 milliGRAM(s) IV Push once  pantoprazole    Tablet 40 milliGRAM(s) Oral before breakfast  polyethylene glycol 3350 17 Gram(s) Oral two times a day  pregabalin 150 milliGRAM(s) Oral two times a day  senna 2 Tablet(s) Oral at bedtime  tiotropium 18 MICROgram(s) Capsule 1 Capsule(s) Inhalation daily    MEDICATIONS  (PRN):  acetaminophen     Tablet .. 650 milliGRAM(s) Oral every 6 hours PRN Temp greater or equal to 38C (100.4F), Mild Pain (1 - 3)  albuterol/ipratropium for Nebulization 3 milliLiter(s) Nebulizer every 6 hours PRN Shortness of Breath and/or Wheezing  aluminum hydroxide/magnesium hydroxide/simethicone Suspension 30 milliLiter(s) Oral every 4 hours PRN Dyspepsia  dextrose Oral Gel 15 Gram(s) Oral once PRN Blood Glucose LESS THAN 70 milliGRAM(s)/deciliter      LABS:                        9.9    7.32  )-----------( 270      ( 27 Apr 2022 07:14 )             34.4     04-27    142  |  106  |  8   ----------------------------<  301<H>  3.9   |  25  |  0.35<L>    Ca    8.3<L>      27 Apr 2022 07:14    TPro  5.5<L>  /  Alb  2.9<L>  /  TBili  0.2  /  DBili  x   /  AST  12  /  ALT  12  /  AlkPhos  65  04-26    < from: Xray Esophagram Double Contrast (04.27.22 @ 13:10) >  INTERPRETATION:  HISTORY:  Feeling of food getting stuck.    TECHNIQUE:  A single contrast esophagram was performed. Fluoroscopy   capture andstatic images were obtained. Fluoroscopy time was 4.2 minutes.    FINDINGS:  There was no evidence of esophageal stricture or mass. There   is decreased primary peristalsis with nonperistaltic contractions and a   few tertiary waves. Small amount of gastroesophageal reflux was seen at   fluoroscopy. There is a tiny sliding hiatal hernia present.    The patient swallowed a barium pill which held up a bit at the GE   junction, but eventually passed. Held up at the GE junction but   eventually passed..    IMPRESSION:  Small hiatal hernia with trace gastroesophageal reflux.    No evidence of mechanical stricture or mass.    Abnormal esophageal motility. Manometry could be performed for further   evaluation as clinically warranted.    --- End of Report ---    < end of copied text >            MICROBIOLOGY:     RADIOLOGY:  [ ] Reviewed and interpreted by me    Point of Care Ultrasound Findings:    PFT:    EKG:
CHIEF COMPLAINT: f/up sob, chronic resp failure, TBM, severe asthma, allergic rhinitis-better today -sx occurred upon eating in bed    Interval Events: none    REVIEW OF SYSTEMS:  Constitutional: No fevers or chills. No weight loss. No fatigue or generalized malaise.  Eyes: No itching or discharge from the eyes  ENT: No ear pain. No ear discharge. No nasal congestion. No post nasal drip. No epistaxis. No throat pain. No sore throat. No difficulty swallowing.   CV: No chest pain. No palpitations. No lightheadedness or dizziness.   Resp: No dyspnea at rest. No dyspnea on exertion. No orthopnea. No wheezing. No cough. No stridor. No sputum production. No chest pain with respiration.  GI: No nausea. No vomiting. No diarrhea.  MSK: No joint pain or pain in any extremities  Integumentary: No skin lesions. No pedal edema.  Neurological: + gross motor weakness. No sensory changes.  [+ ] All other systems negative  [ ] Unable to assess ROS because ________    OBJECTIVE:  ICU Vital Signs Last 24 Hrs  T(C): 36.9 (26 Apr 2022 00:12), Max: 36.9 (25 Apr 2022 13:57)  T(F): 98.4 (26 Apr 2022 00:12), Max: 98.5 (25 Apr 2022 13:57)  HR: 65 (26 Apr 2022 00:12) (65 - 77)  BP: 133/54 (26 Apr 2022 00:12) (113/72 - 150/62)  BP(mean): --  ABP: --  ABP(mean): --  RR: 18 (26 Apr 2022 00:12) (18 - 18)  SpO2: 94% (26 Apr 2022 00:12) (94% - 98%)        04-25 @ 07:01  -  04-26 @ 05:44  --------------------------------------------------------  IN: 240 mL / OUT: 750 mL / NET: -510 mL      CAPILLARY BLOOD GLUCOSE      POCT Blood Glucose.: 94 mg/dL (25 Apr 2022 23:55)      PHYSICAL EXAM: NAD in bed  General: Awake, alert, oriented X 3.   HEENT: Atraumatic, normocephalic.                 Mallampatti Grade 2                No nasal congestion.                No tonsillar or pharyngeal exudates.  Lymph Nodes: No palpable lymphadenopathy  Neck: No JVD. No carotid bruit.   Respiratory: Normal chest expansion                         Normal percussion                         Normal and equal air entry                         + exp wheeze, no rhonchi or rales.  Cardiovascular: S1 S2 normal. No murmurs, rubs or gallops.   Abdomen: Soft, non-tender, non-distended. No organomegaly. Normoactive bowel sounds.  Extremities: Warm to touch. Peripheral pulse palpable. No pedal edema.   Skin: No rashes or skin lesions  Neurological: Motor and sensory examination equal and normal in all four extremities.  Psychiatry: Appropriate mood and affect.    HOSPITAL MEDICATIONS:  MEDICATIONS  (STANDING):  atorvastatin 20 milliGRAM(s) Oral at bedtime  dextrose 5%. 1000 milliLiter(s) (50 mL/Hr) IV Continuous <Continuous>  dextrose 5%. 1000 milliLiter(s) (100 mL/Hr) IV Continuous <Continuous>  dextrose 50% Injectable 25 Gram(s) IV Push once  dextrose 50% Injectable 12.5 Gram(s) IV Push once  fluticasone propionate 50 MICROgram(s)/spray Nasal Spray 1 Spray(s) Both Nostrils two times a day  glucagon  Injectable 1 milliGRAM(s) IntraMuscular once  insulin lispro (ADMELOG) corrective regimen sliding scale   SubCutaneous every 6 hours  lactulose Syrup 15 Gram(s) Oral daily  methylPREDNISolone 8 milliGRAM(s) Oral daily  mexiletine 200 milliGRAM(s) Oral three times a day  ondansetron Injectable 2 milliGRAM(s) IV Push once  pantoprazole    Tablet 40 milliGRAM(s) Oral before breakfast  polyethylene glycol 3350 17 Gram(s) Oral two times a day  pregabalin 150 milliGRAM(s) Oral two times a day  senna 2 Tablet(s) Oral at bedtime  sodium chloride 0.9%. 1000 milliLiter(s) (20 mL/Hr) IV Continuous <Continuous>  tiotropium 18 MICROgram(s) Capsule 1 Capsule(s) Inhalation daily    MEDICATIONS  (PRN):  acetaminophen     Tablet .. 650 milliGRAM(s) Oral every 6 hours PRN Temp greater or equal to 38C (100.4F), Mild Pain (1 - 3)  albuterol/ipratropium for Nebulization 3 milliLiter(s) Nebulizer every 6 hours PRN Shortness of Breath and/or Wheezing  aluminum hydroxide/magnesium hydroxide/simethicone Suspension 30 milliLiter(s) Oral every 4 hours PRN Dyspepsia  dextrose Oral Gel 15 Gram(s) Oral once PRN Blood Glucose LESS THAN 70 milliGRAM(s)/deciliter      LABS:                        10.0   11.34 )-----------( 260      ( 25 Apr 2022 11:16 )             34.1     04-25    141  |  106  |  10  ----------------------------<  172<H>  3.8   |  25  |  0.34<L>    Ca    8.1<L>      25 Apr 2022 11:16    TPro  5.2<L>  /  Alb  2.7<L>  /  TBili  0.2  /  DBili  x   /  AST  16  /  ALT  15  /  AlkPhos  64  04-24    PT/INR - ( 24 Apr 2022 23:38 )   PT: 13.9 sec;   INR: 1.21 ratio         PTT - ( 24 Apr 2022 23:38 )  PTT:30.1 sec          MICROBIOLOGY:     RADIOLOGY:  [ ] Reviewed and interpreted by me    Point of Care Ultrasound Findings:    PFT:    EKG:
Patient is a 71y old  Female who presents with a chief complaint of dysphagia x 1 day (26 Apr 2022 13:21)      INTERVAL History of Present Illness/OVERNIGHT EVENTS: eager to eat.  advance diet as tolerated    MEDICATIONS  (STANDING):  atorvastatin 20 milliGRAM(s) Oral at bedtime  dextrose 5%. 1000 milliLiter(s) (50 mL/Hr) IV Continuous <Continuous>  dextrose 5%. 1000 milliLiter(s) (100 mL/Hr) IV Continuous <Continuous>  dextrose 50% Injectable 25 Gram(s) IV Push once  dextrose 50% Injectable 12.5 Gram(s) IV Push once  fluticasone propionate 50 MICROgram(s)/spray Nasal Spray 1 Spray(s) Both Nostrils two times a day  glucagon  Injectable 1 milliGRAM(s) IntraMuscular once  insulin lispro (ADMELOG) corrective regimen sliding scale   SubCutaneous every 6 hours  lactulose Syrup 15 Gram(s) Oral daily  lidocaine 4% Injection for Nebulization 4 milliLiter(s) Nebulizer once  methylPREDNISolone 8 milliGRAM(s) Oral daily  mexiletine 200 milliGRAM(s) Oral three times a day  ondansetron Injectable 2 milliGRAM(s) IV Push once  pantoprazole    Tablet 40 milliGRAM(s) Oral before breakfast  polyethylene glycol 3350 17 Gram(s) Oral two times a day  potassium chloride  10 mEq/100 mL IVPB 10 milliEquivalent(s) IV Intermittent every 1 hour  pregabalin 150 milliGRAM(s) Oral two times a day  senna 2 Tablet(s) Oral at bedtime  sodium chloride 0.9%. 1000 milliLiter(s) (20 mL/Hr) IV Continuous <Continuous>  sodium chloride 0.9%. 500 milliLiter(s) (30 mL/Hr) IV Continuous <Continuous>  tiotropium 18 MICROgram(s) Capsule 1 Capsule(s) Inhalation daily    MEDICATIONS  (PRN):  acetaminophen     Tablet .. 650 milliGRAM(s) Oral every 6 hours PRN Temp greater or equal to 38C (100.4F), Mild Pain (1 - 3)  albuterol/ipratropium for Nebulization 3 milliLiter(s) Nebulizer every 6 hours PRN Shortness of Breath and/or Wheezing  aluminum hydroxide/magnesium hydroxide/simethicone Suspension 30 milliLiter(s) Oral every 4 hours PRN Dyspepsia  dextrose Oral Gel 15 Gram(s) Oral once PRN Blood Glucose LESS THAN 70 milliGRAM(s)/deciliter      Allergies    aspirin (Short breath)  Avelox (Short breath; Pruritus)  codeine (Short breath)  Dilaudid (Short breath)  iodine (Short breath; Swelling)  penicillin (Unknown)  shellfish (Anaphylaxis)  tetanus toxoid (Short breath)  Valium (Short breath)    Intolerances        REVIEW OF SYSTEMS:  Negative unless otherwise specified above.    Vital Signs Last 24 Hrs  T(C): 36.6 (26 Apr 2022 09:42), Max: 36.9 (25 Apr 2022 13:57)  T(F): 97.8 (26 Apr 2022 09:16), Max: 98.5 (25 Apr 2022 13:57)  HR: 83 (26 Apr 2022 10:58) (65 - 94)  BP: 127/75 (26 Apr 2022 10:58) (113/72 - 142/61)  BP(mean): --  RR: 20 (26 Apr 2022 10:58) (17 - 20)  SpO2: 97% (26 Apr 2022 10:58) (94% - 100%)    Height (cm): 167.6 (04-26 @ 09:42)  Weight (kg): 56.7 (04-26 @ 09:42)  BMI (kg/m2): 20.2 (04-26 @ 09:42)  BSA (m2): 1.64 (04-26 @ 09:42)    PHYSICAL EXAM:  GENERAL: No apparent distress, appears stated age  HEAD:  Atraumatic, Normocephalic  EYES: Conjunctiva and sclera clear, no discharge  ENMT: Moist mucous membranes, no nasal discharge  NECK: Supple, no JVD  CHEST/LUNG: +scattered rhonchi  HEART: Regular rhythm, no rubs or gallops  ABDOMEN: Soft, Nontender, Nondistended; Bowel sounds present  EXTREMITIES:  No clubbing, cyanosis or edema  SKIN: No rash, no new discoloration  NERVOUS SYSTEM:  Alert & Oriented; Bilateral Lower extremity mobile, sensation to light touch intact      LABS:                        10.2   7.91  )-----------( 270      ( 26 Apr 2022 07:47 )             34.4     26 Apr 2022 07:47    145    |  104    |  9      ----------------------------<  89     3.4     |  26     |  0.40     Ca    8.7        26 Apr 2022 07:47    TPro  5.5    /  Alb  2.9    /  TBili  0.2    /  DBili  x      /  AST  12     /  ALT  12     /  AlkPhos  65     26 Apr 2022 07:47    PT/INR - ( 24 Apr 2022 23:38 )   PT: 13.9 sec;   INR: 1.21 ratio         PTT - ( 24 Apr 2022 23:38 )  PTT:30.1 sec    CAPILLARY BLOOD GLUCOSE      POCT Blood Glucose.: 186 mg/dL (26 Apr 2022 12:12)  POCT Blood Glucose.: 107 mg/dL (26 Apr 2022 06:47)  POCT Blood Glucose.: 94 mg/dL (25 Apr 2022 23:55)  POCT Blood Glucose.: 111 mg/dL (25 Apr 2022 21:45)  POCT Blood Glucose.: 140 mg/dL (25 Apr 2022 18:19)  POCT Blood Glucose.: 178 mg/dL (25 Apr 2022 14:03)      RADIOLOGY & ADDITIONAL TESTS:      Images reviewed personally    Consultant Notes Reviewed and Care Discussed with relevant Consultants.
Patient is a 71y old  Female who presents with a chief complaint of dysphagia x 1 day (25 Apr 2022 09:00)      INTERVAL History of Present Illness/OVERNIGHT EVENTS: await EGD  pt does not endorse any complaints    MEDICATIONS  (STANDING):  apixaban 5 milliGRAM(s) Oral every 12 hours  atorvastatin 20 milliGRAM(s) Oral at bedtime  dextrose 5%. 1000 milliLiter(s) (50 mL/Hr) IV Continuous <Continuous>  dextrose 5%. 1000 milliLiter(s) (100 mL/Hr) IV Continuous <Continuous>  dextrose 50% Injectable 25 Gram(s) IV Push once  dextrose 50% Injectable 12.5 Gram(s) IV Push once  glucagon  Injectable 1 milliGRAM(s) IntraMuscular once  insulin glargine Injectable (LANTUS) 10 Unit(s) SubCutaneous at bedtime  insulin lispro (ADMELOG) corrective regimen sliding scale   SubCutaneous three times a day before meals  insulin lispro (ADMELOG) corrective regimen sliding scale   SubCutaneous at bedtime  lactulose Syrup 15 Gram(s) Oral daily  methylPREDNISolone 8 milliGRAM(s) Oral daily  mexiletine 200 milliGRAM(s) Oral three times a day  ondansetron Injectable 2 milliGRAM(s) IV Push once  pantoprazole    Tablet 40 milliGRAM(s) Oral before breakfast  polyethylene glycol 3350 17 Gram(s) Oral two times a day  pregabalin 150 milliGRAM(s) Oral two times a day  senna 2 Tablet(s) Oral at bedtime  sodium chloride 0.9%. 1000 milliLiter(s) (20 mL/Hr) IV Continuous <Continuous>  tiotropium 18 MICROgram(s) Capsule 1 Capsule(s) Inhalation daily    MEDICATIONS  (PRN):  acetaminophen     Tablet .. 650 milliGRAM(s) Oral every 6 hours PRN Temp greater or equal to 38C (100.4F), Mild Pain (1 - 3)  albuterol/ipratropium for Nebulization 3 milliLiter(s) Nebulizer every 6 hours PRN Shortness of Breath and/or Wheezing  aluminum hydroxide/magnesium hydroxide/simethicone Suspension 30 milliLiter(s) Oral every 4 hours PRN Dyspepsia  dextrose Oral Gel 15 Gram(s) Oral once PRN Blood Glucose LESS THAN 70 milliGRAM(s)/deciliter      Allergies    aspirin (Short breath)  Avelox (Short breath; Pruritus)  codeine (Short breath)  Dilaudid (Short breath)  iodine (Short breath; Swelling)  penicillin (Unknown)  shellfish (Anaphylaxis)  tetanus toxoid (Short breath)  Valium (Short breath)    Intolerances        REVIEW OF SYSTEMS:  Negative unless otherwise specified above.    Vital Signs Last 24 Hrs  T(C): 36.7 (25 Apr 2022 10:49), Max: 36.9 (24 Apr 2022 18:13)  T(F): 98 (25 Apr 2022 10:49), Max: 98.4 (24 Apr 2022 18:13)  HR: 74 (25 Apr 2022 10:49) (67 - 94)  BP: 148/62 (25 Apr 2022 10:49) (110/71 - 164/98)  BP(mean): 78 (24 Apr 2022 18:47) (78 - 78)  RR: 18 (25 Apr 2022 10:49) (15 - 18)  SpO2: 97% (25 Apr 2022 10:49) (94% - 100%)    Height (cm): 176 (04-25 @ 10:49)  Weight (kg): 56.7 (04-25 @ 10:49)  BMI (kg/m2): 18.3 (04-25 @ 10:49)  BSA (m2): 1.7 (04-25 @ 10:49)    PHYSICAL EXAM:  GENERAL: No apparent distress, appears stated age  HEAD:  Atraumatic, Normocephalic  EYES: Conjunctiva and sclera clear, no discharge  ENMT: Moist mucous membranes, no nasal discharge  NECK: Supple, no JVD  CHEST/LUNG: Clear to auscultation bilaterally, no wheeze or rales  HEART: Regular rhythm, no rubs or gallops  ABDOMEN: Soft, Nontender, Obese  EXTREMITIES:  No clubbing, cyanosis or edema  SKIN: No rash, no new discoloration  NERVOUS SYSTEM:  Alert & Oriented; Bilateral Lower extremity mobile, sensation to light touch intact      LABS:                        10.0   11.34 )-----------( 260      ( 25 Apr 2022 11:16 )             34.1     25 Apr 2022 11:16    141    |  106    |  10     ----------------------------<  172    3.8     |  25     |  0.34     Ca    8.1        25 Apr 2022 11:16    TPro  5.2    /  Alb  2.7    /  TBili  0.2    /  DBili  x      /  AST  16     /  ALT  15     /  AlkPhos  64     24 Apr 2022 23:38    PT/INR - ( 24 Apr 2022 23:38 )   PT: 13.9 sec;   INR: 1.21 ratio         PTT - ( 24 Apr 2022 23:38 )  PTT:30.1 sec    CAPILLARY BLOOD GLUCOSE      POCT Blood Glucose.: 140 mg/dL (25 Apr 2022 09:21)      RADIOLOGY & ADDITIONAL TESTS:      Images reviewed personally    Consultant Notes Reviewed and Care Discussed with relevant Consultants.
Patient is a 71y old  Female who presents with a chief complaint of dysphagia x 1 day (27 Apr 2022 07:45)      INTERVAL History of Present Illness/OVERNIGHT EVENTS: await esophagogram     MEDICATIONS  (STANDING):  apixaban 5 milliGRAM(s) Oral every 12 hours  atorvastatin 20 milliGRAM(s) Oral at bedtime  dextrose 5%. 1000 milliLiter(s) (50 mL/Hr) IV Continuous <Continuous>  dextrose 5%. 1000 milliLiter(s) (100 mL/Hr) IV Continuous <Continuous>  dextrose 50% Injectable 25 Gram(s) IV Push once  dextrose 50% Injectable 12.5 Gram(s) IV Push once  fluticasone propionate 50 MICROgram(s)/spray Nasal Spray 1 Spray(s) Both Nostrils two times a day  glucagon  Injectable 1 milliGRAM(s) IntraMuscular once  insulin glargine Injectable (LANTUS) 10 Unit(s) SubCutaneous at bedtime  insulin lispro (ADMELOG) corrective regimen sliding scale   SubCutaneous three times a day before meals  insulin lispro (ADMELOG) corrective regimen sliding scale   SubCutaneous at bedtime  insulin lispro Injectable (ADMELOG) 4 Unit(s) SubCutaneous three times a day before meals  lactulose Syrup 15 Gram(s) Oral daily  lidocaine 4% Injection for Nebulization 4 milliLiter(s) Nebulizer once  methylPREDNISolone 8 milliGRAM(s) Oral daily  mexiletine 200 milliGRAM(s) Oral three times a day  ondansetron Injectable 2 milliGRAM(s) IV Push once  pantoprazole    Tablet 40 milliGRAM(s) Oral before breakfast  polyethylene glycol 3350 17 Gram(s) Oral two times a day  pregabalin 150 milliGRAM(s) Oral two times a day  senna 2 Tablet(s) Oral at bedtime  tiotropium 18 MICROgram(s) Capsule 1 Capsule(s) Inhalation daily    MEDICATIONS  (PRN):  acetaminophen     Tablet .. 650 milliGRAM(s) Oral every 6 hours PRN Temp greater or equal to 38C (100.4F), Mild Pain (1 - 3)  albuterol/ipratropium for Nebulization 3 milliLiter(s) Nebulizer every 6 hours PRN Shortness of Breath and/or Wheezing  aluminum hydroxide/magnesium hydroxide/simethicone Suspension 30 milliLiter(s) Oral every 4 hours PRN Dyspepsia  dextrose Oral Gel 15 Gram(s) Oral once PRN Blood Glucose LESS THAN 70 milliGRAM(s)/deciliter      Allergies    aspirin (Short breath)  Avelox (Short breath; Pruritus)  codeine (Short breath)  Dilaudid (Short breath)  iodine (Short breath; Swelling)  penicillin (Unknown)  shellfish (Anaphylaxis)  tetanus toxoid (Short breath)  Valium (Short breath)    Intolerances        REVIEW OF SYSTEMS:  Negative unless otherwise specified above.    Vital Signs Last 24 Hrs  T(C): 36.3 (27 Apr 2022 10:23), Max: 37.2 (26 Apr 2022 12:30)  T(F): 97.3 (27 Apr 2022 10:23), Max: 99 (26 Apr 2022 12:30)  HR: 63 (27 Apr 2022 10:23) (63 - 102)  BP: 128/70 (27 Apr 2022 10:23) (117/52 - 156/98)  BP(mean): --  RR: 18 (27 Apr 2022 10:23) (18 - 18)  SpO2: 92% (27 Apr 2022 10:23) (92% - 98%)        PHYSICAL EXAM:  GENERAL: No apparent distress, appears stated age  HEAD:  Atraumatic, Normocephalic  EYES: Conjunctiva and sclera clear, no discharge  ENMT: Moist mucous membranes, no nasal discharge  NECK: Supple, no JVD  CHEST/LUNG: Clear to auscultation bilaterally, no wheeze or rales  HEART: Regular rhythm, no rubs or gallops  ABDOMEN: Scattered rhonchi  EXTREMITIES:  No clubbing, cyanosis or edema  SKIN: No rash, no new discoloration, DTI sacrum  NERVOUS SYSTEM:  Alert & Oriented; Bilateral Lower extremity mobile, sensation to light touch intact      LABS:                        9.9    7.32  )-----------( 270      ( 27 Apr 2022 07:14 )             34.4     27 Apr 2022 07:14    142    |  106    |  8      ----------------------------<  301    3.9     |  25     |  0.35     Ca    8.3        27 Apr 2022 07:14          CAPILLARY BLOOD GLUCOSE      POCT Blood Glucose.: 252 mg/dL (27 Apr 2022 10:09)  POCT Blood Glucose.: 257 mg/dL (26 Apr 2022 21:44)  POCT Blood Glucose.: 238 mg/dL (26 Apr 2022 17:38)  POCT Blood Glucose.: 186 mg/dL (26 Apr 2022 12:12)      RADIOLOGY & ADDITIONAL TESTS:      Images reviewed personally    Consultant Notes Reviewed and Care Discussed with relevant Consultants.

## 2022-04-28 NOTE — PROGRESS NOTE ADULT - REASON FOR ADMISSION
dysphagia x 1 day

## 2022-04-28 NOTE — PROGRESS NOTE ADULT - PROBLEM SELECTOR PLAN 4
- Spiriva   - DUonebs prn

## 2022-05-09 DIAGNOSIS — R13.10 DYSPHAGIA, UNSPECIFIED: ICD-10-CM

## 2022-05-10 ENCOUNTER — INPATIENT (INPATIENT)
Facility: HOSPITAL | Age: 72
LOS: 2 days | Discharge: NURSING, REHAB OR LONG-TERM CARE | DRG: 637 | End: 2022-05-13
Attending: STUDENT IN AN ORGANIZED HEALTH CARE EDUCATION/TRAINING PROGRAM | Admitting: HOSPITALIST
Payer: MEDICARE

## 2022-05-10 VITALS
OXYGEN SATURATION: 97 % | RESPIRATION RATE: 18 BRPM | HEART RATE: 73 BPM | DIASTOLIC BLOOD PRESSURE: 78 MMHG | TEMPERATURE: 98 F | SYSTOLIC BLOOD PRESSURE: 143 MMHG | HEIGHT: 66 IN | WEIGHT: 130.07 LBS

## 2022-05-10 DIAGNOSIS — Z98.890 OTHER SPECIFIED POSTPROCEDURAL STATES: Chronic | ICD-10-CM

## 2022-05-10 DIAGNOSIS — Z98.89 OTHER SPECIFIED POSTPROCEDURAL STATES: Chronic | ICD-10-CM

## 2022-05-10 DIAGNOSIS — T14.8XXA OTHER INJURY OF UNSPECIFIED BODY REGION, INITIAL ENCOUNTER: ICD-10-CM

## 2022-05-10 DIAGNOSIS — I48.0 PAROXYSMAL ATRIAL FIBRILLATION: ICD-10-CM

## 2022-05-10 DIAGNOSIS — J45.909 UNSPECIFIED ASTHMA, UNCOMPLICATED: ICD-10-CM

## 2022-05-10 DIAGNOSIS — K62.5 HEMORRHAGE OF ANUS AND RECTUM: Chronic | ICD-10-CM

## 2022-05-10 DIAGNOSIS — I10 ESSENTIAL (PRIMARY) HYPERTENSION: ICD-10-CM

## 2022-05-10 DIAGNOSIS — E11.9 TYPE 2 DIABETES MELLITUS WITHOUT COMPLICATIONS: ICD-10-CM

## 2022-05-10 DIAGNOSIS — Z96.653 PRESENCE OF ARTIFICIAL KNEE JOINT, BILATERAL: Chronic | ICD-10-CM

## 2022-05-10 DIAGNOSIS — R13.10 DYSPHAGIA, UNSPECIFIED: ICD-10-CM

## 2022-05-10 DIAGNOSIS — E11.621 TYPE 2 DIABETES MELLITUS WITH FOOT ULCER: ICD-10-CM

## 2022-05-10 DIAGNOSIS — H05.352 EXOSTOSIS OF LEFT ORBIT: Chronic | ICD-10-CM

## 2022-05-10 DIAGNOSIS — Z87.09 PERSONAL HISTORY OF OTHER DISEASES OF THE RESPIRATORY SYSTEM: Chronic | ICD-10-CM

## 2022-05-10 LAB
A1C WITH ESTIMATED AVERAGE GLUCOSE RESULT: 8 % — HIGH (ref 4–5.6)
ALBUMIN SERPL ELPH-MCNC: 2.9 G/DL — LOW (ref 3.3–5)
ALP SERPL-CCNC: 62 U/L — SIGNIFICANT CHANGE UP (ref 40–120)
ALT FLD-CCNC: 13 U/L — SIGNIFICANT CHANGE UP (ref 10–45)
ANION GAP SERPL CALC-SCNC: 12 MMOL/L — SIGNIFICANT CHANGE UP (ref 5–17)
APTT BLD: 31.3 SEC — SIGNIFICANT CHANGE UP (ref 27.5–35.5)
AST SERPL-CCNC: 15 U/L — SIGNIFICANT CHANGE UP (ref 10–40)
BASOPHILS # BLD AUTO: 0.03 K/UL — SIGNIFICANT CHANGE UP (ref 0–0.2)
BASOPHILS NFR BLD AUTO: 0.4 % — SIGNIFICANT CHANGE UP (ref 0–2)
BILIRUB SERPL-MCNC: 0.2 MG/DL — SIGNIFICANT CHANGE UP (ref 0.2–1.2)
BUN SERPL-MCNC: 7 MG/DL — SIGNIFICANT CHANGE UP (ref 7–23)
CALCIUM SERPL-MCNC: 8.9 MG/DL — SIGNIFICANT CHANGE UP (ref 8.4–10.5)
CHLORIDE SERPL-SCNC: 106 MMOL/L — SIGNIFICANT CHANGE UP (ref 96–108)
CO2 SERPL-SCNC: 24 MMOL/L — SIGNIFICANT CHANGE UP (ref 22–31)
CREAT SERPL-MCNC: 0.4 MG/DL — LOW (ref 0.5–1.3)
CRP SERPL-MCNC: 4 MG/L — SIGNIFICANT CHANGE UP (ref 0–4)
EGFR: 104 ML/MIN/1.73M2 — SIGNIFICANT CHANGE UP
EOSINOPHIL # BLD AUTO: 0.18 K/UL — SIGNIFICANT CHANGE UP (ref 0–0.5)
EOSINOPHIL NFR BLD AUTO: 2.3 % — SIGNIFICANT CHANGE UP (ref 0–6)
ERYTHROCYTE [SEDIMENTATION RATE] IN BLOOD: 32 MM/HR — HIGH (ref 0–20)
ESTIMATED AVERAGE GLUCOSE: 183 MG/DL — HIGH (ref 68–114)
GLUCOSE BLDC GLUCOMTR-MCNC: 181 MG/DL — HIGH (ref 70–99)
GLUCOSE SERPL-MCNC: 154 MG/DL — HIGH (ref 70–99)
HCT VFR BLD CALC: 35.8 % — SIGNIFICANT CHANGE UP (ref 34.5–45)
HGB BLD-MCNC: 10.3 G/DL — LOW (ref 11.5–15.5)
IMM GRANULOCYTES NFR BLD AUTO: 0.8 % — SIGNIFICANT CHANGE UP (ref 0–1.5)
INR BLD: 1.19 RATIO — HIGH (ref 0.88–1.16)
LYMPHOCYTES # BLD AUTO: 2.1 K/UL — SIGNIFICANT CHANGE UP (ref 1–3.3)
LYMPHOCYTES # BLD AUTO: 26.5 % — SIGNIFICANT CHANGE UP (ref 13–44)
MCHC RBC-ENTMCNC: 22.4 PG — LOW (ref 27–34)
MCHC RBC-ENTMCNC: 28.8 GM/DL — LOW (ref 32–36)
MCV RBC AUTO: 78 FL — LOW (ref 80–100)
MONOCYTES # BLD AUTO: 0.86 K/UL — SIGNIFICANT CHANGE UP (ref 0–0.9)
MONOCYTES NFR BLD AUTO: 10.9 % — SIGNIFICANT CHANGE UP (ref 2–14)
NEUTROPHILS # BLD AUTO: 4.69 K/UL — SIGNIFICANT CHANGE UP (ref 1.8–7.4)
NEUTROPHILS NFR BLD AUTO: 59.1 % — SIGNIFICANT CHANGE UP (ref 43–77)
NRBC # BLD: 0 /100 WBCS — SIGNIFICANT CHANGE UP (ref 0–0)
PLATELET # BLD AUTO: 328 K/UL — SIGNIFICANT CHANGE UP (ref 150–400)
POTASSIUM SERPL-MCNC: 3.7 MMOL/L — SIGNIFICANT CHANGE UP (ref 3.5–5.3)
POTASSIUM SERPL-SCNC: 3.7 MMOL/L — SIGNIFICANT CHANGE UP (ref 3.5–5.3)
PROT SERPL-MCNC: 5.6 G/DL — LOW (ref 6–8.3)
PROTHROM AB SERPL-ACNC: 13.7 SEC — HIGH (ref 10.5–13.4)
RBC # BLD: 4.59 M/UL — SIGNIFICANT CHANGE UP (ref 3.8–5.2)
RBC # FLD: 18.6 % — HIGH (ref 10.3–14.5)
SARS-COV-2 RNA SPEC QL NAA+PROBE: SIGNIFICANT CHANGE UP
SODIUM SERPL-SCNC: 142 MMOL/L — SIGNIFICANT CHANGE UP (ref 135–145)
WBC # BLD: 7.92 K/UL — SIGNIFICANT CHANGE UP (ref 3.8–10.5)
WBC # FLD AUTO: 7.92 K/UL — SIGNIFICANT CHANGE UP (ref 3.8–10.5)

## 2022-05-10 PROCEDURE — 73630 X-RAY EXAM OF FOOT: CPT | Mod: 26,RT

## 2022-05-10 PROCEDURE — 71045 X-RAY EXAM CHEST 1 VIEW: CPT | Mod: 26

## 2022-05-10 PROCEDURE — 99223 1ST HOSP IP/OBS HIGH 75: CPT

## 2022-05-10 PROCEDURE — 99285 EMERGENCY DEPT VISIT HI MDM: CPT | Mod: GC

## 2022-05-10 RX ORDER — PANTOPRAZOLE SODIUM 20 MG/1
40 TABLET, DELAYED RELEASE ORAL
Refills: 0 | Status: DISCONTINUED | OUTPATIENT
Start: 2022-05-10 | End: 2022-05-13

## 2022-05-10 RX ORDER — INSULIN LISPRO 100/ML
VIAL (ML) SUBCUTANEOUS
Refills: 0 | Status: DISCONTINUED | OUTPATIENT
Start: 2022-05-10 | End: 2022-05-13

## 2022-05-10 RX ORDER — DEXTROSE 50 % IN WATER 50 %
25 SYRINGE (ML) INTRAVENOUS ONCE
Refills: 0 | Status: DISCONTINUED | OUTPATIENT
Start: 2022-05-10 | End: 2022-05-13

## 2022-05-10 RX ORDER — GLUCAGON INJECTION, SOLUTION 0.5 MG/.1ML
1 INJECTION, SOLUTION SUBCUTANEOUS ONCE
Refills: 0 | Status: DISCONTINUED | OUTPATIENT
Start: 2022-05-10 | End: 2022-05-13

## 2022-05-10 RX ORDER — POLYETHYLENE GLYCOL 3350 17 G/17G
17 POWDER, FOR SOLUTION ORAL
Refills: 0 | Status: DISCONTINUED | OUTPATIENT
Start: 2022-05-10 | End: 2022-05-13

## 2022-05-10 RX ORDER — DEXTROSE 50 % IN WATER 50 %
12.5 SYRINGE (ML) INTRAVENOUS ONCE
Refills: 0 | Status: DISCONTINUED | OUTPATIENT
Start: 2022-05-10 | End: 2022-05-13

## 2022-05-10 RX ORDER — INSULIN LISPRO 100/ML
VIAL (ML) SUBCUTANEOUS AT BEDTIME
Refills: 0 | Status: DISCONTINUED | OUTPATIENT
Start: 2022-05-10 | End: 2022-05-13

## 2022-05-10 RX ORDER — SODIUM CHLORIDE 9 MG/ML
1000 INJECTION, SOLUTION INTRAVENOUS
Refills: 0 | Status: DISCONTINUED | OUTPATIENT
Start: 2022-05-10 | End: 2022-05-13

## 2022-05-10 RX ORDER — DEXTROSE 50 % IN WATER 50 %
15 SYRINGE (ML) INTRAVENOUS ONCE
Refills: 0 | Status: DISCONTINUED | OUTPATIENT
Start: 2022-05-10 | End: 2022-05-13

## 2022-05-10 RX ORDER — ASCORBIC ACID 60 MG
500 TABLET,CHEWABLE ORAL DAILY
Refills: 0 | Status: DISCONTINUED | OUTPATIENT
Start: 2022-05-10 | End: 2022-05-13

## 2022-05-10 RX ORDER — INSULIN GLARGINE 100 [IU]/ML
10 INJECTION, SOLUTION SUBCUTANEOUS AT BEDTIME
Refills: 0 | Status: DISCONTINUED | OUTPATIENT
Start: 2022-05-10 | End: 2022-05-13

## 2022-05-10 RX ORDER — ACETAMINOPHEN 500 MG
650 TABLET ORAL EVERY 6 HOURS
Refills: 0 | Status: DISCONTINUED | OUTPATIENT
Start: 2022-05-10 | End: 2022-05-13

## 2022-05-10 RX ORDER — ATORVASTATIN CALCIUM 80 MG/1
20 TABLET, FILM COATED ORAL AT BEDTIME
Refills: 0 | Status: DISCONTINUED | OUTPATIENT
Start: 2022-05-10 | End: 2022-05-13

## 2022-05-10 RX ORDER — FLUTICASONE PROPIONATE 50 MCG
1 SPRAY, SUSPENSION NASAL
Refills: 0 | Status: DISCONTINUED | OUTPATIENT
Start: 2022-05-10 | End: 2022-05-13

## 2022-05-10 RX ORDER — ZINC SULFATE TAB 220 MG (50 MG ZINC EQUIVALENT) 220 (50 ZN) MG
220 TAB ORAL DAILY
Refills: 0 | Status: DISCONTINUED | OUTPATIENT
Start: 2022-05-10 | End: 2022-05-13

## 2022-05-10 RX ORDER — IPRATROPIUM/ALBUTEROL SULFATE 18-103MCG
3 AEROSOL WITH ADAPTER (GRAM) INHALATION EVERY 6 HOURS
Refills: 0 | Status: DISCONTINUED | OUTPATIENT
Start: 2022-05-10 | End: 2022-05-13

## 2022-05-10 RX ORDER — TIOTROPIUM BROMIDE 18 UG/1
1 CAPSULE ORAL; RESPIRATORY (INHALATION) DAILY
Refills: 0 | Status: DISCONTINUED | OUTPATIENT
Start: 2022-05-10 | End: 2022-05-13

## 2022-05-10 RX ORDER — INSULIN LISPRO 100/ML
4 VIAL (ML) SUBCUTANEOUS
Refills: 0 | Status: DISCONTINUED | OUTPATIENT
Start: 2022-05-10 | End: 2022-05-13

## 2022-05-10 RX ORDER — APIXABAN 2.5 MG/1
5 TABLET, FILM COATED ORAL
Refills: 0 | Status: DISCONTINUED | OUTPATIENT
Start: 2022-05-10 | End: 2022-05-13

## 2022-05-10 RX ORDER — OXYCODONE HYDROCHLORIDE 5 MG/1
2.5 TABLET ORAL EVERY 6 HOURS
Refills: 0 | Status: DISCONTINUED | OUTPATIENT
Start: 2022-05-10 | End: 2022-05-13

## 2022-05-10 RX ORDER — SENNA PLUS 8.6 MG/1
2 TABLET ORAL AT BEDTIME
Refills: 0 | Status: DISCONTINUED | OUTPATIENT
Start: 2022-05-10 | End: 2022-05-13

## 2022-05-10 RX ORDER — MEXILETINE HYDROCHLORIDE 150 MG/1
200 CAPSULE ORAL THREE TIMES A DAY
Refills: 0 | Status: DISCONTINUED | OUTPATIENT
Start: 2022-05-10 | End: 2022-05-13

## 2022-05-10 RX ORDER — LACTULOSE 10 G/15ML
15 SOLUTION ORAL DAILY
Refills: 0 | Status: DISCONTINUED | OUTPATIENT
Start: 2022-05-10 | End: 2022-05-13

## 2022-05-10 RX ADMIN — INSULIN GLARGINE 10 UNIT(S): 100 INJECTION, SOLUTION SUBCUTANEOUS at 22:41

## 2022-05-10 RX ADMIN — SENNA PLUS 2 TABLET(S): 8.6 TABLET ORAL at 22:32

## 2022-05-10 RX ADMIN — ATORVASTATIN CALCIUM 20 MILLIGRAM(S): 80 TABLET, FILM COATED ORAL at 22:32

## 2022-05-10 NOTE — H&P ADULT - PROBLEM SELECTOR PLAN 1
Wound is on right medial malleolus with no redness or warmth and appears to be healing somewhat with eschar. Very low concern for overlying cellulitis. Very low concern for osteomyelitis. Inflammatory markers are not too elevated. No fever or leukocytosis    -Podiatry consult: likely local wound care  -MRI ankle ordered to rule out deep infection, but little concern

## 2022-05-10 NOTE — ED PROVIDER NOTE - NSICDXPASTSURGICALHX_GEN_ALL_CORE_FT
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    H/O pelvic surgery 5 years ago - s/p fracture    H/O total knee replacement, bilateral 5 years ago    History of partial hysterectomy 30 years ago - fibroids    History of sinus surgery multiple sinus surgeries    History of tracheomalacia 2015 - attempted tracheal stenting ([facility])- course complicated by obstruction, respiratory failure, multiple CPR attempts -  stent discontinued; 10/20/2016 Tracheobronchoplasty (Prolene Mesh) performed at [facility] by Dr Zapien    Rectal bleeding exam under anesthesia ([facility]) 2/2018    S/P bronchoscopy 6/5/2018 - [place] (Dr Zapien) no evidence of tracheobronchomalacia in trachea or bronchial tubes

## 2022-05-10 NOTE — H&P ADULT - NSHPPHYSICALEXAM_GEN_ALL_CORE
T(C): 36.9 (05-10-22 @ 16:48), Max: 36.9 (05-10-22 @ 11:26)  HR: 74 (05-10-22 @ 16:48) (73 - 74)  BP: 151/70 (05-10-22 @ 16:48) (133/84 - 151/70)  RR: 16 (05-10-22 @ 16:48) (16 - 18)  SpO2: 99% (05-10-22 @ 16:48) (97% - 100%)    GEN: (fe)male in NAD, appears comfortable, no diaphoresis  EYES: No scleral injection, PERRL, EOMI  ENTM: neck supple & symmetric without tracheal deviation, moist membranes, no gross hearing impairment, thyroid gland not enlarged  CV: +S1/S2, no m/r/g, no abdominal bruit, no LE edema  RESP: breathing comfortably, no respiratory accessory muscle use, CTAB, no w/r/r  GI: normoactive BS, soft, NTND, no rebounding/guarding, no palpable masses  LYMPHATICS: no LAD or tenderness to palpation  NEURO: AOx3, no focal deficits, CNII-XII grossly intact  PSYCH: No SI/HI/AVH, appropriate affect, appropriate insight/judgment   SKIN: no petechiae, ecchymosis or maculopapular rash noted T(C): 36.9 (05-10-22 @ 16:48), Max: 36.9 (05-10-22 @ 11:26)  HR: 74 (05-10-22 @ 16:48) (73 - 74)  BP: 151/70 (05-10-22 @ 16:48) (133/84 - 151/70)  RR: 16 (05-10-22 @ 16:48) (16 - 18)  SpO2: 99% (05-10-22 @ 16:48) (97% - 100%)    GEN: female in NAD, appears comfortable, no diaphoresis  EYES: No scleral injection, PERRL, EOMI  ENTM: neck supple & symmetric without tracheal deviation, moist membranes, no gross hearing impairment, thyroid gland not enlarged  CV: +S1/S2, no m/r/g, no abdominal bruit, no LE edema  RESP: breathing comfortably, no respiratory accessory muscle use, CTAB, no w/r/r  GI: normoactive BS, soft, NTND, no rebounding/guarding, no palpable masses  LYMPHATICS: no LAD or tenderness to palpation  NEURO: AOx3, no focal deficits, CNII-XII grossly intact  PSYCH: No SI/HI/AVH, appropriate affect, appropriate insight/judgment   SKIN: chronic looking wound on right medial malleolus with eschar, no redness/warmth

## 2022-05-10 NOTE — ED ADULT NURSE REASSESSMENT NOTE - NS ED NURSE REASSESS COMMENT FT1
pt is alert and oriented,  resting on bed, complaints of foot pain, however declines pain medication.

## 2022-05-10 NOTE — ED PROVIDER NOTE - CLINICAL SUMMARY MEDICAL DECISION MAKING FREE TEXT BOX
71yoF w/complex PMHx incl. PVD, DM p/w R foot pain i/s/o R foot ulcer w/new purulent drainage. VSS, phys exam significant for R foot ulcer overlying bone +ttp w/purulent drainage and surrounding skin darkening, no obvious erythema/warmth/crepitus/fluctuance. Concern for OM vs. abscess vs. cellulitis unlikely as no erythema/warmth, will eval w/labs, XR, podiatry c/s, likely admit for MR foot if XR negative. - Vidal Lanier, PGY-1

## 2022-05-10 NOTE — H&P ADULT - PROBLEM SELECTOR PLAN 6
-Follows with Eulalio Allison  -Continue with Spiriva  -DuoNebs q6 hours  -Continue with chronic steroids

## 2022-05-10 NOTE — ED PROVIDER NOTE - PHYSICAL EXAMINATION
Gen: AAOx3, non-toxic WDWN elderly woman lying in bed in NAD  Head: NCAT  HEENT: EOMI, PERRLA, +oral mucosa dry, normal conjunctiva  Lung: CTAB, no respiratory distress, no wheezes/rhonchi/rales B/L, speaking in full sentences  CV: RRR, no murmurs, rubs or gallops  Abd: soft, NTND, no guarding, no CVA tenderness  MSK: no visible deformities  Neuro: No focal sensory or motor deficits elicited  Skin: Cool, well perfused, no rash; 2+ radial and DP pulses b/l, +R foot ulcer w/surrounding skin darkening and pus drainage, +ttp, no erythema/warmth/crepitus/fluctuance  Psych: pleasant, normal affect.   ~Vidal Lanier M.D. Resident

## 2022-05-10 NOTE — ED ADULT NURSE REASSESSMENT NOTE - NS ED NURSE REASSESS COMMENT FT1
dinner tray provided, dtr present to feed pt, pt was boosted/repositioned for comfort, pillow under R buttock for pressure relief

## 2022-05-10 NOTE — ED PROVIDER NOTE - OBJECTIVE STATEMENT
tracheobronchomalasia s/p tracheobronchoplasty, bronchiectasis, severe persistent asthma (steroid dependent), adrenal insuffiencey on chronic steroids, DM2, HTN, colorectal cancer s/p total colectomy now with colostomy, PAF on Eliquis, PVD, stage 3 sacral pressure ulcer p/w R foot pain. Pt has R foot ulcer which began weeping dark pus on Friday associated w/worsening R foot pain now 8/10 nonradiating, partially relieved by percocet (last dose this AM pre-arrival). Rehab physician Dr. Feng sent pt in to [facility] for OM. Pt also states PMD requested modified barium swallow for unrelated reasons if admitted. She denies HA, CP, abd pain, n/v/d/c, dysuria/hematuria, hematochezia/melena, fevers/chills, focal weakness, new numbness/tingling, swelling.     PMD: Dr. Bon Samuels

## 2022-05-10 NOTE — H&P ADULT - NSICDXPASTSURGICALHX_GEN_ALL_CORE_FT
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    H/O pelvic surgery 5 years ago - s/p fracture    H/O total knee replacement, bilateral 5 years ago    History of partial hysterectomy 30 years ago - fibroids    History of sinus surgery multiple sinus surgeries    History of tracheomalacia 2015 - attempted tracheal stenting ([facility])- course complicated by obstruction, respiratory failure, multiple CPR attempts -  stent discontinued; 10/20/2016 Tracheobronchoplasty (Prolene Mesh) performed at [facility] by Dr Zapien    Rectal bleeding exam under anesthesia ([facility]) 2/2018    S/P bronchoscopy 6/5/2018 - [facility] (Dr Zapien) no evidence of tracheobronchomalacia in trachea or bronchial tubes

## 2022-05-10 NOTE — ED PROVIDER NOTE - NS ED ROS FT
GENERAL: No fever or chills, EYES: no change in vision, HEENT: no trouble swallowing or speaking, CARDIAC: no chest pain, PULMONARY: no cough or SOB, GI: no abdominal pain, no nausea, no vomiting, no diarrhea or constipation, : No changes in urination, SKIN: no rashes, NEURO: no headache,  MSK: +R foot pain    All other ROS negative unless otherwise specified in HPI.     ~Vidal Lanier M.D. Resident

## 2022-05-10 NOTE — H&P ADULT - PROBLEM SELECTOR PLAN 2
Seen by S&S prior and deferred MBSS because patient pending placement. Had EGD/FEES. Concern for recurrent aspiration.    -Pureed diet with thin liquids for now  -S&S eval for re-evaluation

## 2022-05-10 NOTE — H&P ADULT - ASSESSMENT
71F with PMHx of severe persistent asthma (steroid dependent with adrenal insufficiency secondary to chronic use), T2DM, HTN, colorectal cancer (with colostomy), tracheobronchomalacia (post-tracheobronchoplasty), bronchiectasis, paroxysmal atrial fibrillation and PVD sent in by [facility] physician to rule out infection of chronic right medial malleolus wound.

## 2022-05-10 NOTE — ED PROVIDER NOTE - ATTENDING CONTRIBUTION TO CARE
Patient presenting for worsening pain and appearance of R medial foot wound.  Noticed yesterday on removing bandages now having worsening pain and some purulent drainage so sent for evaluation to r/o osteomyelitis.  Denying fevers/chills or systemic symptoms.  Also requesting barium swallow evaluation per PMD unrelated to acute foot issue.    Exam:  General: Patient well appearing, vital signs within normal limits  HEENT: airway patent with moist mucous membranes  Cardiac: RRR S1/S2 with strong DP pulses  Respiratory: lungs clear without respiratory distress  GI: abdomen soft, non tender, non distended  Neuro: no gross neurologic deficits  MSK: approximately nickel sized ulcer to R medial foot with some purulent drainage and surrounding skin changes  Skin: warm, well perfused  Psych: normal mood and affect    Patient with foot wound possibly infected, from exam am also concerned for possible abscess vs osteo - plan for labs, XR, podiatry consult.  Discussed with patient that barium swallow testing not routinely available from Emergency Department - may be possible as inpatient if admitted but cannot confirm.

## 2022-05-10 NOTE — ED ADULT NURSE NOTE - ED STAT RN HANDOFF DETAILS 3
hand off given to oncoming RN's Rayna Lucero and Yesenia Matamoros. Pt continues to await bed upstairs and MRI. Redness noted at right heel. ALLEY Peres notified. Pt to be eval by podiatry. Fall risk precautions and  contact isolation maintained

## 2022-05-10 NOTE — ED ADULT NURSE NOTE - NSIMPLEMENTINTERV_GEN_ALL_ED
Implemented All Fall with Harm Risk Interventions:  [place] to call system. Call bell, personal items and telephone within reach. Instruct patient to call for assistance. Room bathroom lighting operational. Non-slip footwear when patient is off stretcher. Physically safe environment: no spills, clutter or unnecessary equipment. Stretcher in lowest position, wheels locked, appropriate side rails in place. Provide visual cue, wrist band, yellow gown, etc. Monitor gait and stability. Monitor for mental status changes and reorient to person, place, and time. Review medications for side effects contributing to fall risk. Reinforce activity limits and safety measures with patient and family. Provide visual clues: red socks.

## 2022-05-10 NOTE — ED ADULT NURSE NOTE - OBJECTIVE STATEMENT
71 yr old female by EMS from [facility] for R medial malleolus chronic, dime sized open wound without active drainage, suspicious for osteomyelitis, (pt recounts recent "code" and intubation with prolonged hospital stay), has been making progress towards getting OOB, "shufffling steps" with 2 person assist, +has stage 3 R sided sacral wound with no active drainage/bleeding, maex4, A&ox3, denies any c/o currently, abd soft, nontender, vitals stable 71 yr old female by EMS from [facility] for R medial malleolus chronic, dime sized open wound without active drainage, suspicious for osteomyelitis, (pt recounts recent "code" and intubation with prolonged hospital stay), has been making progress towards getting OOB, "shufffling steps" with 2 person assist, L sided colostomy with no output present, +has stage 3 R sided sacral wound with no active drainage/bleeding, maex4, A&ox3, denies any c/o currently, abd soft, nontender, vitals stable

## 2022-05-10 NOTE — ED ADULT NURSE NOTE - NSICDXPASTSURGICALHX_GEN_ALL_CORE_FT
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    H/O pelvic surgery 5 years ago - s/p fracture    H/O total knee replacement, bilateral 5 years ago    History of partial hysterectomy 30 years ago - fibroids    History of sinus surgery multiple sinus surgeries    History of tracheomalacia 2015 - attempted tracheal stenting ([facility])- course complicated by obstruction, respiratory failure, multiple CPR attempts -  stent discontinued; 10/20/2016 Tracheobronchoplasty (Prolene Mesh) performed at [facility] by Dr Zapien    Rectal bleeding exam under anesthesia ([facility]) 2/2018    S/P bronchoscopy 6/5/2018 - [facility] (Dr Zapien) no evidence of tracheobronchomalacia in trachea or bronchial tubes

## 2022-05-10 NOTE — H&P ADULT - HISTORY OF PRESENT ILLNESS
tracheobronchomalasia s/p tracheobronchoplasty, bronchiectasis, severe persistent asthma (steroid dependent), adrenal insuffiencey on chronic steroids, DM2, HTN, colorectal cancer s/p total colectomy now with colostomy, PAF on Eliquis, PVD, stage 3 sacral pressure ulcer p/w R foot pain. Pt has R foot ulcer which began weeping dark pus on Friday associated w/worsening R foot pain now 8/10 nonradiating, partially relieved by percocet (last dose this AM pre-arrival). Rehab physician Dr. Feng sent pt in to [facility] for OM. Pt also states PMD requested modified barium swallow for unrelated reasons if admitted. She denies HA, CP, abd pain, n/v/d/c, dysuria/hematuria, hematochezia/melena, fevers/chills, focal weakness, new numbness/tingling, swelling.     71 F w/  tracheobronchomalasia s/p tracheobronchoplasty, bronchiectasis, severe persistent asthma (steroid dependent), adrenal insuffiencey on chronic steroids, DM2, HTN, colorectal cancer s/p total colectomy now with colostomy, PAF on Eliquis,  recent hospitalizations for pneumonia, presents from  rehab ([facility] in [place])  for dysphagia  x 1 day        No tracking soft tissue gas collections, radiopaque foreign bodies, or   gross radiographic evidence for osteomyelitis.     71F with PMHx of severe persistent asthma (steroid dependent with adrenal insufficiency secondary to chronic use), T2DM, HTN, colorectal cancer (with colostomy), tracheobronchomalacia (post-tracheobronchoplasty), bronchiectasis, paroxysmal atrial fibrillation and PVD sent in by [facility] physician to rule out infection of chronic right medial malleolus wound. Patient states wound on right foot near medial malleolus started in January 2022 around the time she was admitted to [facility] for pneumonia and was intubated. Since then has never truly healed. Recently admitted to [facility] twice in past two months. First admission for pneumonia and bronchiectasis exacerbation. Second admission for stuck food bolus. On that admission patient had EGD & seen by S&S. Patient states she started noticing increasing pain of right foot wound several days prior. Notice more discharge. She thinks they were wrapping the wound too tightly at rehab and skin was being sloughed off. She denies systemic symptoms such as fever or chills. Has been tolerating a pureed diet with no gross aspiration, but states he appetite is limited because the pureed diet at [facility] is horrible. Patient seen by podiatry for possible infection. Foot XR showed no tracking soft tissue gas collections or gross radiographic evidence of osteomyelitis.

## 2022-05-11 ENCOUNTER — TRANSCRIPTION ENCOUNTER (OUTPATIENT)
Age: 72
End: 2022-05-11

## 2022-05-11 LAB
ANION GAP SERPL CALC-SCNC: 12 MMOL/L — SIGNIFICANT CHANGE UP (ref 5–17)
BUN SERPL-MCNC: 7 MG/DL — SIGNIFICANT CHANGE UP (ref 7–23)
CALCIUM SERPL-MCNC: 8.5 MG/DL — SIGNIFICANT CHANGE UP (ref 8.4–10.5)
CHLORIDE SERPL-SCNC: 104 MMOL/L — SIGNIFICANT CHANGE UP (ref 96–108)
CO2 SERPL-SCNC: 26 MMOL/L — SIGNIFICANT CHANGE UP (ref 22–31)
CREAT SERPL-MCNC: 0.39 MG/DL — LOW (ref 0.5–1.3)
EGFR: 105 ML/MIN/1.73M2 — SIGNIFICANT CHANGE UP
GLUCOSE BLDC GLUCOMTR-MCNC: 117 MG/DL — HIGH (ref 70–99)
GLUCOSE BLDC GLUCOMTR-MCNC: 164 MG/DL — HIGH (ref 70–99)
GLUCOSE BLDC GLUCOMTR-MCNC: 198 MG/DL — HIGH (ref 70–99)
GLUCOSE BLDC GLUCOMTR-MCNC: 212 MG/DL — HIGH (ref 70–99)
GLUCOSE BLDC GLUCOMTR-MCNC: 305 MG/DL — HIGH (ref 70–99)
GLUCOSE SERPL-MCNC: 129 MG/DL — HIGH (ref 70–99)
HCT VFR BLD CALC: 34.9 % — SIGNIFICANT CHANGE UP (ref 34.5–45)
HGB BLD-MCNC: 10.4 G/DL — LOW (ref 11.5–15.5)
MCHC RBC-ENTMCNC: 22.6 PG — LOW (ref 27–34)
MCHC RBC-ENTMCNC: 29.8 GM/DL — LOW (ref 32–36)
MCV RBC AUTO: 75.7 FL — LOW (ref 80–100)
MRSA PCR RESULT.: SIGNIFICANT CHANGE UP
NRBC # BLD: 0 /100 WBCS — SIGNIFICANT CHANGE UP (ref 0–0)
PLATELET # BLD AUTO: 310 K/UL — SIGNIFICANT CHANGE UP (ref 150–400)
POTASSIUM SERPL-MCNC: 3.3 MMOL/L — LOW (ref 3.5–5.3)
POTASSIUM SERPL-SCNC: 3.3 MMOL/L — LOW (ref 3.5–5.3)
RBC # BLD: 4.61 M/UL — SIGNIFICANT CHANGE UP (ref 3.8–5.2)
RBC # FLD: 18 % — HIGH (ref 10.3–14.5)
S AUREUS DNA NOSE QL NAA+PROBE: SIGNIFICANT CHANGE UP
SODIUM SERPL-SCNC: 142 MMOL/L — SIGNIFICANT CHANGE UP (ref 135–145)
WBC # BLD: 6.87 K/UL — SIGNIFICANT CHANGE UP (ref 3.8–10.5)
WBC # FLD AUTO: 6.87 K/UL — SIGNIFICANT CHANGE UP (ref 3.8–10.5)

## 2022-05-11 PROCEDURE — 99233 SBSQ HOSP IP/OBS HIGH 50: CPT

## 2022-05-11 PROCEDURE — 73723 MRI JOINT LWR EXTR W/O&W/DYE: CPT | Mod: 26,RT

## 2022-05-11 RX ORDER — CHLORHEXIDINE GLUCONATE 213 G/1000ML
1 SOLUTION TOPICAL DAILY
Refills: 0 | Status: DISCONTINUED | OUTPATIENT
Start: 2022-05-11 | End: 2022-05-13

## 2022-05-11 RX ADMIN — Medication 150 MILLIGRAM(S): at 05:45

## 2022-05-11 RX ADMIN — ZINC SULFATE TAB 220 MG (50 MG ZINC EQUIVALENT) 220 MILLIGRAM(S): 220 (50 ZN) TAB at 14:43

## 2022-05-11 RX ADMIN — Medication 150 MILLIGRAM(S): at 20:26

## 2022-05-11 RX ADMIN — Medication 1: at 10:27

## 2022-05-11 RX ADMIN — Medication 4 UNIT(S): at 20:25

## 2022-05-11 RX ADMIN — LACTULOSE 15 GRAM(S): 10 SOLUTION ORAL at 13:33

## 2022-05-11 RX ADMIN — SENNA PLUS 2 TABLET(S): 8.6 TABLET ORAL at 22:20

## 2022-05-11 RX ADMIN — PANTOPRAZOLE SODIUM 40 MILLIGRAM(S): 20 TABLET, DELAYED RELEASE ORAL at 10:04

## 2022-05-11 RX ADMIN — Medication 650 MILLIGRAM(S): at 04:40

## 2022-05-11 RX ADMIN — Medication 3 MILLILITER(S): at 13:35

## 2022-05-11 RX ADMIN — OXYCODONE HYDROCHLORIDE 2.5 MILLIGRAM(S): 5 TABLET ORAL at 11:49

## 2022-05-11 RX ADMIN — INSULIN GLARGINE 10 UNIT(S): 100 INJECTION, SOLUTION SUBCUTANEOUS at 22:19

## 2022-05-11 RX ADMIN — OXYCODONE HYDROCHLORIDE 2.5 MILLIGRAM(S): 5 TABLET ORAL at 13:40

## 2022-05-11 RX ADMIN — OXYCODONE HYDROCHLORIDE 2.5 MILLIGRAM(S): 5 TABLET ORAL at 04:40

## 2022-05-11 RX ADMIN — APIXABAN 5 MILLIGRAM(S): 2.5 TABLET, FILM COATED ORAL at 05:45

## 2022-05-11 RX ADMIN — Medication 8 MILLIGRAM(S): at 06:41

## 2022-05-11 RX ADMIN — Medication 1: at 13:57

## 2022-05-11 RX ADMIN — OXYCODONE HYDROCHLORIDE 2.5 MILLIGRAM(S): 5 TABLET ORAL at 02:58

## 2022-05-11 RX ADMIN — APIXABAN 5 MILLIGRAM(S): 2.5 TABLET, FILM COATED ORAL at 20:26

## 2022-05-11 RX ADMIN — Medication 1 TABLET(S): at 14:43

## 2022-05-11 RX ADMIN — MEXILETINE HYDROCHLORIDE 200 MILLIGRAM(S): 150 CAPSULE ORAL at 05:45

## 2022-05-11 RX ADMIN — Medication 3 MILLILITER(S): at 20:26

## 2022-05-11 RX ADMIN — Medication 650 MILLIGRAM(S): at 03:49

## 2022-05-11 RX ADMIN — MEXILETINE HYDROCHLORIDE 200 MILLIGRAM(S): 150 CAPSULE ORAL at 22:20

## 2022-05-11 RX ADMIN — Medication 4: at 20:24

## 2022-05-11 RX ADMIN — TIOTROPIUM BROMIDE 1 CAPSULE(S): 18 CAPSULE ORAL; RESPIRATORY (INHALATION) at 13:35

## 2022-05-11 RX ADMIN — POLYETHYLENE GLYCOL 3350 17 GRAM(S): 17 POWDER, FOR SOLUTION ORAL at 05:47

## 2022-05-11 RX ADMIN — POLYETHYLENE GLYCOL 3350 17 GRAM(S): 17 POWDER, FOR SOLUTION ORAL at 20:27

## 2022-05-11 RX ADMIN — Medication 4 UNIT(S): at 10:18

## 2022-05-11 RX ADMIN — Medication 500 MILLIGRAM(S): at 14:42

## 2022-05-11 RX ADMIN — Medication 3 MILLILITER(S): at 05:47

## 2022-05-11 RX ADMIN — ATORVASTATIN CALCIUM 20 MILLIGRAM(S): 80 TABLET, FILM COATED ORAL at 22:20

## 2022-05-11 NOTE — SWALLOW BEDSIDE ASSESSMENT ADULT - SLP GENERAL OBSERVATIONS
Pt encountered in bed, awake, on room air. A&Ox4. Good historian. Able to follow commands for purpose of assessment. Occasional cough at baseline.

## 2022-05-11 NOTE — ED ADULT NURSE REASSESSMENT NOTE - NS ED NURSE REASSESS COMMENT FT1
0707 Pt in ER gold rm 3. TBA. No bed yet. Sleeping. Resp deep and even. Awakens to verbal stimuli. A&Ox4. IV fluid infusing well via IV pump to mediport right chest. Dressing intact right foot. skin W&D. lips and nailbeds pink.. contact isolation maintained. Fall risk precautions maintained

## 2022-05-11 NOTE — SWALLOW BEDSIDE ASSESSMENT ADULT - SWALLOW EVAL: DIAGNOSIS
71F with PMHx notable for severe persistent asthma, colorectal cancer (with colostomy), tracheobronchomalacia (post-tracheobronchoplasty), bronchiectasis, and dysphagia sent in by [facility] physician to rule out infection of chronic right medial malleolus wound. Pt presents with functional oropharyngeal swallow for purees/ thin liquids, with no overt signs of laryngeal penetration/aspiration observed. Trials of solids not administered given documented history of laryngeal penetration with minced/moist textures - would proceed with instrumental exam to determine candidacy for diet advancement.

## 2022-05-11 NOTE — DISCHARGE NOTE PROVIDER - HOSPITAL COURSE
71F with PMHx of severe persistent asthma (steroid dependent with adrenal insufficiency secondary to chronic use), T2DM, HTN, colorectal cancer (with colostomy),   tracheobronchomalacia (post-tracheo bronchoplasty), bronchiectasis, paroxysmal atrial fibrillation and PVD sent in by [facility] physician to rule out infection of chronic right medial   malleolus wound.      Problem/Plan - 1:  ·  Problem: Nonhealing nonsurgical wound.   ·  Plan: Wound is on right medial malleolus with no redness or warmth and appears to be healing somewhat with eschar. Very low concern for overlying cellulitis. Very low concern for   osteomyelitis. Inflammatory markers are not too elevated. No fever or leukocytosis  -MRI neg for osteo  -Podiatry recommending dry sterile dressing to R foot daily with gauze and yesenia, Z flow offloading boots while in bed b/l.     Problem/Plan - 2:  ·  Problem: Dysphagia.   ·  Plan: Seen by S&S , had MBS, placed on regular diet     Problem/Plan - 3:  ·  Problem: Paroxysmal atrial fibrillation.   ·  Plan: -Continue with Eliquis.     Problem/Plan - 4:  ·  Problem: Diabetes mellitus.   ·  Plan: -Continue with basal-bolus  -FS TID AC & insulin sliding scale.     Problem/Plan - 5:  ·  Problem: Hypertension.   ·  Plan: -Patient not on medications, monitor.     Problem/Plan - 6:  ·  Problem: Asthma.   ·  Plan: -Follows with Eulalio Allison  -Continue with Spiriva  -DuoNebs q6 hours  -Continue with chronic steroids     71F with PMHx of severe persistent asthma (steroid dependent with adrenal insufficiency secondary to chronic use), T2DM, HTN, colorectal cancer (with colostomy),   tracheobronchomalacia (post-tracheo bronchoplasty), bronchiectasis, paroxysmal atrial fibrillation and PVD sent in by [facility] physician to rule out infection of chronic right medial   malleolus wound.      Problem/Plan - 1:  ·  Problem: Nonhealing nonsurgical wound.   ·  Plan: Wound is on right medial malleolus with no redness or warmth and appears to be healing somewhat with eschar. Very low concern for overlying cellulitis. Very low concern for   osteomyelitis. Inflammatory markers are not too elevated. No fever or leukocytosis  -MRI neg for osteo  -Podiatry recommending dry sterile dressing to R foot daily with gauze and yesenia, Z flow offloading boots while in bed b/l.     Problem/Plan - 2:  ·  Problem: Dysphagia.   ·  Plan: Seen by S&S , had MBS, placed on regular diet     Problem/Plan - 3:  ·  Problem: Paroxysmal atrial fibrillation.   ·  Plan: -Continue with Eliquis.     Problem/Plan - 4:  ·  Problem: Diabetes mellitus.   ·  Plan: -Continue with basal-bolus  -FS TID AC & insulin sliding scale.     Problem/Plan - 5:  ·  Problem: Hypertension.   ·  Plan: -Patient not on medications, monitor.     Problem/Plan - 6:  ·  Problem: Asthma.   ·  Plan: -Follows with Eulalio Allison  -Continue with Spiriva  -DuoNebs q6 hours  -Continue with chronic steroids  Cleared for discharge as per Dr Chung

## 2022-05-11 NOTE — SWALLOW BEDSIDE ASSESSMENT ADULT - COMMENTS
Has been tolerating a pureed diet with no gross aspiration, but states he appetite is limited because the pureed diet at [facility] is horrible. Patient seen by podiatry for possible infection. Foot XR showed no tracking soft tissue gas collections or gross radiographic evidence of osteomyelitis.   Problem: Dysphagia.   ·  Plan: Seen by S&S prior and deferred MBSS because patient pending placement. Had EGD/FEES. Concern for recurrent aspiration.    -Pureed diet with thin liquids for now  -S&S eval for re-evaluation. Has been tolerating a pureed diet with no gross aspiration, but states he appetite is limited because the pureed diet at [facility] is horrible. Patient seen by podiatry for possible infection. Foot XR showed no tracking soft tissue gas collections or gross radiographic evidence of osteomyelitis.   Problem: Dysphagia:  -Plan: Seen by S&S prior and deferred MBSS because patient pending placement. Had EGD/FEES. Concern for recurrent aspiration.  -Pureed diet with thin liquids for now  -S&S eval for re-evaluation.    Pt known to this service. Seen most recently for bedside swallow evals on 4/26 and 4/28 with recommendations for a puree diet/ thin liquids; also discussed instrumental exam however patient declined exam. Seen for FEES on 5/11/22, with recommendations for puree/ thin liquids, as pt with laryngeal penetration of minced/moist textures without immediate retrieval.

## 2022-05-11 NOTE — DISCHARGE NOTE PROVIDER - NSDCFUADDAPPT_GEN_ALL_CORE_FT
Podiatry Discharge Instructions:  Follow up: Please follow up with Dr. Valenzuela within 1 week of discharge from the hospital, please call [phone] for appointment and discuss that you recently were seen in the hospital.  Wound Care: Please apply 4x4 gauze and yesenia to right foot wound daily.   Weight bearing: Please wear z-flow offloading boots on both feet at all times while in bed.

## 2022-05-11 NOTE — DISCHARGE NOTE PROVIDER - CARE PROVIDER_API CALL
Eulalio Allison (MD)  Internal Medicine; Pulmonary Disease  [address], [address]  [place] [address]  Phone: [phone]  Fax: [phone]  Follow Up Time:

## 2022-05-11 NOTE — CHART NOTE - NSCHARTNOTEFT_GEN_A_CORE
Podiatry reviewed MR results showing no osteomyelitis right foot   No further podiatric intervention at this time- recommend dry sterile dressing to R foot daily w/ 4x4 gauze and yesenia   Pt to wear z-flow offloading boots on b/l feet at all times while in bed  instructions for discharge in provider note  please reconsult as necessary

## 2022-05-11 NOTE — ED ADULT NURSE REASSESSMENT NOTE - NS ED NURSE REASSESS COMMENT FT1
Pt back from MRI. She states "There is redness to my right heel that wasn't there when podiatry saw me earlier." Area of redness noted to heel. KIANA Peres notified - states to place loose dressing over area and she will send a student to assess site. Dressing placed on area. Pt back from MRI. She states "There is redness to my right heel that wasn't there when podiatry saw me earlier." Area of redness noted to heel. KIANA Peres notified - states to place loose dressing over area and she will send a student to assess site. Dressing placed on area. Awaiting bed assignment.

## 2022-05-11 NOTE — DISCHARGE NOTE PROVIDER - NSDCFUSCHEDAPPT_GEN_ALL_CORE_FT
Eulalio Allison  [facility]  PulmMed [address]  Scheduled Appointment: 05/31/2022    Bon Samuels  [facility]  Med GenInt [address]  Scheduled Appointment: 07/26/2022    Genesis Aragon  [facility]  Cardio Electro [id number]  Scheduled Appointment: 07/28/2022    Rico Glover  [facility]  Cardio [id number]  Scheduled Appointment: 07/28/2022

## 2022-05-11 NOTE — SWALLOW BEDSIDE ASSESSMENT ADULT - ASPIRATION PRECAUTIONS
Monitor for s/s aspiration/laryngeal penetration. If noted:  D/C p.o. intake, provide non-oral nutrition/hydration/meds, and contact this service @ [id number]

## 2022-05-11 NOTE — ED ADULT NURSE REASSESSMENT NOTE - NS ED NURSE REASSESS COMMENT FT1
Received report from RADHA Cavanaugh @1430. Pt AOx4, no signs of acute distress. Pillow placed under foot and foot is wrapped

## 2022-05-11 NOTE — ED ADULT NURSE REASSESSMENT NOTE - NS ED NURSE REASSESS COMMENT FT1
1020 glucose fingerstick 164. Standing order of Admelog SQ given 4units plus 1 unit coverage. pt voided 300cc on bedpan. Wearing diaper. Has colostomy , stage 3 decub on [id number] Pt eating breakfast. continues to await bed upstairs

## 2022-05-11 NOTE — DISCHARGE NOTE PROVIDER - NSDCACTIVITY_GEN_ALL_CORE
Patient:   Dino Jacobson            MRN: [id number]            FIN: [id number]              Age:   62 years     Sex:  MALE     :  56   Associated Diagnoses:   None   Author:   ARUN Mireles     Subjective   Additional Info (Inserted Image. Unable to display)       Consulted by Dr. Chidi Colon for evaluation of [facility]      Patient is a 61 y/o male w/ hx of DM, HTN, PAD, diabetic foot ulcer w/ osteo s/p recent course of IV cefepime who presnted to ED / progressive weakness  Was noted to have K of 5.8 and Cr of 1.77 on labs done 4 days ago for cataract sx, per notes patient was told to go to ED but did not until last evening (patient states that went as soon as he was told)  Patient was d/c'd from [facility] after completing abx on , states that has been progressively weaker since that time  Denies any n/v, states po intake has been decent.    States some loose stools but really only yesterday morning prior to presentation to ED  Denies any NSAIDs, herbal supplements   Has been taking medications as prescribed (ncluding lisinopril and lasix)       Review of systems:   Constitutional:  + weakness   Eyes:  Denies change in vision, double vision, or loss of vision  Ears, Nose, Mouth, Throat:  Denies bleeding, masses, pain, or changes/loss of hearing  Cardiovascular:  Denies chest pain, chest pressure, palpitations, PND, rapid or irregular heart beat  Respiratory:  Denies SOB, LEE, or unusual/persistent cough  Gastrointestinal:  + diarrhea   Genitourinary:  Denies dysuria, hematuria, weak urinary stream, incontinence, or genital lesions  Musculoskeletal:  Denies joint pain, immobility or loss of function  Dermatologic:  Denies rash, masses, skin lesions, or color changes  Neurological:  Denies dizziness, localized weakness, parasthesias, loss of sensation/function, or syncope  Psychiatric:  Denies anxiety, depression, or mood swings  Endocrine:  Denies unusual weight loss/gain, polyuria, polydipsia, hair loss/gain  Hematologic/Lymphatic:  Denies unusual bleeding, easy bruisabililty, masses, or soaking night sweats  Allergic/Immunologic:  Denies hives or unusual reactions to any medications, foods, animals, or insects          Medications (16) Active  Scheduled: (6)  Brimonidine 0.2% ophth soln 5 mL  1 drop, Right Eye, BID  DULoxetine 30 mg DR cap  30 mg 1 cap, Oral, Daily  insulin glargine  44 unit 0.44 mL, Subcutaneous, Q Bedtime  Insulin human lispro 100 unit/mL inj 3 mL BULK  1-8 units, Subcutaneous, QID [after meals & HS]  Insulin human lispro 100 unit/mL inj 3 mL BULK  25 unit 0.25 mL, Subcutaneous, TID [with meals]  Sodium polystyrene sulfonate 15 gm/60 mL oral susp UD  30 gm 120 mL, Oral, Once (scheduled)  Continuous: (1)  Sodium Chloride 0.9% 1,000 mL  1,000 mL, IV, 125 mL/hr  PRN: (9)  Acetaminophen 325 mg tab  650 mg 2 tab, Oral, Q6H  Calcium carbonate 500 mg chew tab [Ca 200 mg]  500 mg 1 tab, Chewed, TID  Dextrose (glucose) 40% 15 gm/37.5 gm oral gel UD  15 gm, Oral, As Directed PRN  Dextrose (glucose) 50% 25 gm/50 mL syringe  12.5 gm 25 mL, IV Push, As Directed PRN  Glucagon 1 mg/1 mL emergency kit SDV  1 mg 1 mL, IM, As Directed PRN  Melatonin 3 mg tab  3 mg 1 tab, Oral, Q Bedtime  Ondansetron 4 mg disintegrating tab  4 mg 1 tab, Oral, Q6H  OxyCODONE 5 mg IR tab  10 mg 2 tab, Oral, Q8H  Senna-docusate sodium 8.6-50 mg tab  1 tab, Oral, Q Bedtime   . History of Present Illness   NOTES   Past Medical History:  Back pain  Cataract with complication of right eye  Chronic pain  Depression  Diabetes  H/O melanoma excision  HTN (hypertension)  History of Clostridium difficile intestinal infection  Hyperlipidemia  Leg pain  Noncompliance with CPAP treatment  Obstructive sleep apnea  Risk factors for obstructive sleep apnea  Sleep apnea    Past Surgical History:  No qualifying data available. Family Histroy:  MOTHER: Lung    Social History:  Alcohol  Details: Use: None.   Details: Alcohol Abuse in Household: No.  Use: None. Details: Alcohol Abuse in Household: No.  Use: None. Exercise  Details: Exercise: Never. Substance Abuse  Details: Use: None. Details: Substance Abuse in Household: No.  Use: None. Details: Substance Abuse in Household: No.  Tobacco  Details: Smoked/Smokeless Tobacco Last 30 Days: No.  Smoking Tobacco Use: Former smoker. Smokeless Tobacco Use Never. Details: Smoker in [place]: Yes. Smoked/Smokeless Tobacco Last 30 Days: Yes. Smoking Tobacco Use: Current every day smoker. Smokeless Tobacco Use Never. Type: Cigarettes. Cigarette Packs/Day: 4 Cigarettes or Less Per Day. Details: Smoker in [place]: No.  Smoked/Smokeless Tobacco Last 30 Days: Yes. Smoking Tobacco Use: Former smoker. Smokeless Tobacco Use Never. Type: Cigarettes. ; Comment(s): quit in nov. 2017  Cultural/Denominational Practices  Details: Denominational or Cultural Practices While in Hospital: No.    Allergies (1) Active Reaction  PCN (penicillins) Rash      Home Medications (13) Active  acetaminophen oral 325 mg tablet (Tylenol) 650 mg = 2 tab, PRN, Oral, Q6H  brimonidine 0.15% ophthalmic solution 1 drop, Right Eye, BID  Cymbalta oral 30 mg DR capsule 30 mg = 1 cap, Oral, Daily  furosemide oral 20 mg tablet 20 mg = 1 tab, Oral, BID  gabapentin oral 100 mg capsule 300 mg = 3 cap, Oral, Q Bedtime  Lantus (insulin glargine) subcutaneous injection 100 unit/mL 44 unit = 0.44 mL, Subcutaneous, Q Bedtime  lisinopril 20 mg, Oral, Daily  Maxipime 2,000 mg = 100 mL, IVPB, Q12H  NovoLOG (insulin aspart) 25 unit, Subcutaneous, TID [before meals]  oxyCODONE 10 mg oral tablet 20 mg = 2 tab, Oral, QAM  oxyCODONE 10 mg oral tablet 10 mg = 1 tab, PRN, Oral, Q8H  tiZANidine oral 4 mg tablet (Zanaflex) 8 mg = 2 tab, PRN, Oral, Q Bedtime  vancomycin oral solution 125 mg = 2.5 mL, Oral, Daily   .        Physical Examination   VS/Measurements       Vitals between:   27-JUN-2019 08:26:15   TO   28-JUN-2019 08:26:15                   LAST RESULT MINIMUM MAXIMUM  Temperature 36.1 35.9 36.6  Heart Rate 81 78 87  Respiratory Rate 16 10 18  NISBP           129 83 137  NIDBP           72 53 80  NIMBP           91 68 97  SpO2                    97 93 97    General:  Alert and oriented, No acute distress. Eye:  Normal conjunctiva. HENT:  Oral mucosa is moist.    Respiratory:  Lungs are clear to auscultation. Cardiovascular:  Normal rate, trace le edema . Gastrointestinal:  Soft, Non-tender, Non-distended, Normal bowel sounds. Integumentary:  Warm, Dry. Neurologic:  Alert, Oriented. Cognition and Speech:  Oriented.       Review / Management   Laboratory results:       Labs between:  27-JUN-2019 08:26 to 28-JUN-2019 08:26    CBC:                 WBC  HgB  Hct  Plt  MCV  RDW   28-JUN-2019 8.0  (L) 9.0  (L) 30.1  186  83.6  15.0   27-JUN-2019 9.6  (L) 8.7  (L) 28.4  195  84.0  (H) 15.2     DIFF:                 Seg  Neutroph//ABS  Lymph//ABS  Mono//ABS  EOS/ABS  [id number] NOT APPLICABLE  65 // 5.2 22 // 1.8 8 // 0.6 5 // 0.4  [id number] NOT APPLICABLE  53 // 5.1 32 // 3.1 7 // 0.7 7 // (H) 0.6     BMP:                 Na  Cl  BUN  Glu   28-JUN-2019 136  104  (H) 95  (H) 128                              K  CO2  Cr  Ca                              (!) 6.3  26  (H) 2.98  8.4   BMP:                 Na  Cl  BUN  Glu   27-JUN-2019 136  103  (H) 95  (H) 109                              K  CO2  Cr  Ca                              (H) 6.0  28  (H) 3.45  8.6   BMP:                 Na  Cl  BUN  Glu   27-JUN-2019                                     K  CO2  Cr  Ca                              (H) 5.9         BMP:                 Na  Cl  BUN  Glu   27-JUN-2019 135  102  (H) 97  (H) 148                              K  CO2  Cr  Ca                              (!) 6.9  28  (H) 3.98  8.4     CMP:                 AST  ALT  AlkPhos  Bili  Albumin   28-JUN-2019 11  15  (H) 130  0.4  (L) 3.4   27-JUN-2019 19  20  (H) 127  0.5  3.6     Other Chem:             Mg  Phos Triglycerides  GGTP  DirectBili                           2.0  (H) 6.4           POC GLU:                 Latest Result  Latest Date  Minimum  Min Date  Maximum  Max Date                             (H) 134  28-JUN-2019 (H) 134  28-JUN-2019 (H) 106  27-JUN-2019    COAG:                 INR  PT  PTT  Ddimer  Fibrinogen    27-JUN-2019 1.1  11.0                        ,   Chemistry  Sodium: 136 mmol/L (06/28/19 07:28:44)  Chloride: 104 mmol/L (06/28/19 07:28:44)  BUN: 95 mg/dL High (06/28/19 07:28:44)  Glucose Level: 128 mg/dL High (06/28/19 07:28:44)  Potassium: 6.3 mmol/L Critical (06/28/19 07:28:44)  Carbon Dioxide (CO2): 26 mmol/L (06/28/19 07:28:44)  Creatinine: 2.98 mg/dL High (06/28/19 07:28:44)  Calcium: 8.4 mg/dL (06/28/19 47:88:41)  Magnesium: 2 mg/dL (06/27/19 23:07:37)  Phosphorus: 6.4 mg/dL High (06/27/19 23:07:39)        CBC  WBC: 8 THOUSAND/mcL (06/28/19 07:01:09)  Hemoglobin: 9 gm/dL Low (06/28/19 07:01:09)  Hematocrit: 30.1 % Low (06/28/19 07:01:09)  Platelet: 504 THOUSAND/mcL (06/28/19 07:01:09)  MCV: 83.6 fL (06/28/19 07:01:09)  RDW-CV: 15 % (06/28/19 07:01:09)     . Impression and Plan   Dx and Plan:  Diagnosis     Impression:  - Andreas Ahumada, suspect renal hypoperfusion in setting of furosemide and lisinopril +/- ? diminished po intake/loose stools (patient denies but ? historian) --> improving w/ IVF  - HyperK 2/2 AKInjury and RASi, improved w/ medical mgmt and renal improvement, hemolyzed this morning  - Hyperphos 2/2 reduced GFR   - Recent osteomyelitis s/p abx, but off for the last week and benign UA speaks against AIN.  Some peripheral eosinophilia noted however  - HTN but bp running lower on presentation and currently stable off meds, likely related to volume depletion  - Anemia   - R renal cyst   - DM   - Morbid obesity     Plan:  - Continue 0.9NS @ 125 cc/hr   - Will give one dose of kayexelate 30 gm orally today   - Hold further lasix   - Hold lisinopril   - Renal, diabetic diet   - Dose meds for GFR < 30 mL/min   - OK to restart gabapentin at 300 mg po qhs   - No further imaging for renal cyst required   - Repeat bmp later this morning to track K   - Check iron stores in am   - Strit I/Os, daily weights   - Daily labs       Will continue to follow along with you. Thank you for allowing us to participate in the care of your patient. Herminio Galvez MD   Office [phone]  [facility]  [id number] [facility], [place]          . Lauren Bose No heavy lifting/straining

## 2022-05-11 NOTE — DISCHARGE NOTE PROVIDER - NSDCCPCAREPLAN_GEN_ALL_CORE_FT
PRINCIPAL DISCHARGE DIAGNOSIS  Diagnosis: Nonhealing nonsurgical wound  Assessment and Plan of Treatment: Problem: Nonhealing nonsurgical wound.   ·  Plan: Wound is on right medial malleolus with no redness or warmth and appears to be healing somewhat with eschar. Very low concern for overlying cellulitis. Very low concern for   osteomyelitis. Inflammatory markers are not too elevated. No fever or leukocytosis  -MRI neg for osteo  -Podiatry recommending dry sterile dressing to R foot daily with gauze and yesenia, Z flow offloading boots while in bed b/l.        SECONDARY DISCHARGE DIAGNOSES  Diagnosis: Diabetes mellitus  Assessment and Plan of Treatment: HgA1C this admission.  Make sure you get your HgA1c checked every three months.  If you take oral diabetes medications, check your blood glucose two times a day.  If you take insulin, check your blood glucose before meals and at bedtime.  It's important not to skip any meals.  Keep a log of your blood glucose results and always take it with you to your doctor appointments.  Keep a list of your current medications including injectables and over the counter medications and bring this medication list with you to all your doctor appointments.  If you have not seen your ophthalmologist this year call for appointment.  Check your feet daily for redness, sores, or openings. Do not self treat. If no improvement in two days call your primary care physician for an appointment.  Low blood sugar (hypoglycemia) is a blood sugar below 70mg/dl. Check your blood sugar if you feel signs/symptoms of hypoglycemia. If your blood sugar is below 70 take 15 grams of carbohydrates (ex 4 oz of apple juice, 3-4 glucose tablets, or 4-6 oz of regular soda) wait 15 minutes and repeat blood sugar to make sure it comes up above 70.  If your blood sugar is above 70 and you are due for a meal, have a meal.  If you are not due for a meal have a snack.  This snack helps keeps your blood sugar at a safe range.      Diagnosis: Hypertension  Assessment and Plan of Treatment: Not on meds. Monitor    Diagnosis: Asthma  Assessment and Plan of Treatment: Continue current medications. Follow up with Dr Allison    Diagnosis: Paroxysmal atrial fibrillation  Assessment and Plan of Treatment: Continue current medications.  Atrial fibrillation is the most common heart rhythm problem & has the risk of stroke & heart attack  It helps if you control your blood pressure, not drink more than 1-2 alcohol drinks per day, cut down on caffeine, getting treatment for over active thyroid gland, & getting exercise  Call your doctor if you feel your heart racing or beating unusually, chest tightness or pain, lightheaded, faint, shortness of breath especially with exercise  It is important to take your heart medication as prescribed  You may be on anticoagulation which is very important to take as directed - you may need blood work to monitor drug levels       PRINCIPAL DISCHARGE DIAGNOSIS  Diagnosis: Nonhealing nonsurgical wound  Assessment and Plan of Treatment: Nonhealing nonsurgical wound.   ·  Plan: Wound is on right medial malleolus with no redness or warmth and appears to be healing somewhat with eschar. Very low concern for overlying cellulitis. Very low concern for   osteomyelitis. Inflammatory markers are not too elevated. No fever or leukocytosis  -MRI neg for osteo  -Podiatry recommending dry sterile dressing to R foot daily with gauze and yesenia, Z flow offloading boots while in bed b/l.        SECONDARY DISCHARGE DIAGNOSES  Diagnosis: Diabetes mellitus  Assessment and Plan of Treatment:   Make sure you get your HgA1c checked every three months.  If you take oral diabetes medications, check your blood glucose two times a day.  If you take insulin, check your blood glucose before meals and at bedtime.  It's important not to skip any meals.  Keep a log of your blood glucose results and always take it with you to your doctor appointments.  Keep a list of your current medications including injectables and over the counter medications and bring this medication list with you to all your doctor appointments.  If you have not seen your ophthalmologist this year call for appointment.  Check your feet daily for redness, sores, or openings. Do not self treat. If no improvement in two days call your primary care physician for an appointment.  Low blood sugar (hypoglycemia) is a blood sugar below 70mg/dl. Check your blood sugar if you feel signs/symptoms of hypoglycemia. If your blood sugar is below 70 take 15 grams of carbohydrates (ex 4 oz of apple juice, 3-4 glucose tablets, or 4-6 oz of regular soda) wait 15 minutes and repeat blood sugar to make sure it comes up above 70.  If your blood sugar is above 70 and you are due for a meal, have a meal.  If you are not due for a meal have a snack.  This snack helps keeps your blood sugar at a safe range.      Diagnosis: Hypertension  Assessment and Plan of Treatment: Not on meds. Monitor    Diagnosis: Asthma  Assessment and Plan of Treatment: Continue current medications. Follow up with Dr Allison    Diagnosis: Paroxysmal atrial fibrillation  Assessment and Plan of Treatment: Continue current medications.  Atrial fibrillation is the most common heart rhythm problem & has the risk of stroke & heart attack  It helps if you control your blood pressure, not drink more than 1-2 alcohol drinks per day, cut down on caffeine, getting treatment for over active thyroid gland, & getting exercise  Call your doctor if you feel your heart racing or beating unusually, chest tightness or pain, lightheaded, faint, shortness of breath especially with exercise  It is important to take your heart medication as prescribed  You may be on anticoagulation which is very important to take as directed - you may need blood work to monitor drug levels

## 2022-05-11 NOTE — SWALLOW BEDSIDE ASSESSMENT ADULT - SLP PERTINENT HISTORY OF CURRENT PROBLEM
71F with PMHx of severe persistent asthma (steroid dependent with adrenal insufficiency secondary to chronic use), T2DM, HTN, colorectal cancer (with colostomy), tracheobronchomalacia (post-tracheobronchoplasty), bronchiectasis, paroxysmal atrial fibrillation and PVD sent in by [facility] physician to rule out infection of chronic right medial malleolus wound. Patient states wound on right foot near medial malleolus started in January 2022 around the time she was admitted to [facility] for pneumonia and was intubated. Since then has never truly healed. Recently admitted to [facility] twice in past two months. First admission for pneumonia and bronchiectasis exacerbation. Second admission for stuck food bolus. On that admission patient had EGD & seen by [facility]. Patient states she started noticing increasing pain of right foot wound several days prior. Notice more discharge. She thinks they were wrapping the wound too tightly at rehab and skin was being sloughed off.

## 2022-05-11 NOTE — DISCHARGE NOTE PROVIDER - NSDCMRMEDTOKEN_GEN_ALL_CORE_FT
Admelog 100 units/mL injectable solution: 4 unit(s) injectable 3 times a day (before meals)  apixaban 5 mg oral tablet: 1 tab(s) orally every 12 hours  ascorbic acid 500 mg oral tablet: 1 tab(s) orally once a day  Crestor 5 mg oral tablet: 1 tab(s) orally once a day (at bedtime)  fluticasone 50 mcg/inh nasal spray: 1 spray(s) nasal 2 times a day  insulin glargine 100 units/mL subcutaneous solution: 10 unit(s) subcutaneous once a day (at bedtime)  ipratropium-albuterol 0.5 mg-2.5 mg/3 mL inhalation solution: 3 milliliter(s) inhaled every 6 hours  lactulose 10 g/15 mL oral syrup: 22.5 milliliter(s) orally once a day  methylPREDNISolone 8 mg oral tablet: 1 tab(s) orally once a day  mexiletine 200 mg oral capsule: 1 cap(s) orally 3 times a day  Multiple Vitamins oral tablet: 1 tab(s) orally once a day  pantoprazole 40 mg oral delayed release tablet: 1 tab(s) orally once a day (before a meal)  polyethylene glycol 3350 oral powder for reconstitution: 17 gram(s) orally 2 times a day  pregabalin 150 mg oral capsule: 1 cap(s) orally 2 times a day  senna oral tablet: 2 tab(s) orally once a day (at bedtime)  Spiriva 18 mcg inhalation capsule: 1 cap(s) inhaled once a day  zinc sulfate 220 mg oral capsule: 1 cap(s) orally once a day   acetaminophen 325 mg oral tablet: 2 tab(s) orally every 6 hours, As needed, Temp greater or equal to 38C (100.4F), Mild Pain (1 - 3), Moderate Pain (4 - 6)  Admelog 100 units/mL injectable solution: 4 unit(s) injectable 3 times a day (before meals)  apixaban 5 mg oral tablet: 1 tab(s) orally every 12 hours  ascorbic acid 500 mg oral tablet: 1 tab(s) orally once a day  budesonide-formoterol 160 mcg-4.5 mcg/inh inhalation aerosol: 2 puff(s) inhaled 2 times a day  Crestor 5 mg oral tablet: 1 tab(s) orally once a day (at bedtime)  fluticasone 50 mcg/inh nasal spray: 1 spray(s) nasal 2 times a day  hydrOXYzine hydrochloride 25 mg oral tablet: 1 tab(s) orally 2 times a day, As needed, Itching x 7 days  insulin glargine 100 units/mL subcutaneous solution: 10 unit(s) subcutaneous once a day (at bedtime)  ipratropium-albuterol 0.5 mg-2.5 mg/3 mL inhalation solution: 3 milliliter(s) inhaled every 6 hours  lactulose 10 g/15 mL oral syrup: 22.5 milliliter(s) orally once a day  methylPREDNISolone 8 mg oral tablet: 1 tab(s) orally once a day  mexiletine 200 mg oral capsule: 1 cap(s) orally 3 times a day  Multiple Vitamins oral tablet: 1 tab(s) orally once a day  pantoprazole 40 mg oral delayed release tablet: 1 tab(s) orally once a day (before a meal)  petrolatum topical ointment: 1 application topically 2 times a day  polyethylene glycol 3350 oral powder for reconstitution: 17 gram(s) orally 2 times a day  pregabalin 150 mg oral capsule: 1 cap(s) orally 2 times a day  senna oral tablet: 2 tab(s) orally once a day (at bedtime)  Spiriva 18 mcg inhalation capsule: 1 cap(s) inhaled once a day  zinc sulfate 220 mg oral capsule: 1 cap(s) orally once a day

## 2022-05-11 NOTE — DISCHARGE NOTE PROVIDER - DETAILS OF MALNUTRITION DIAGNOSIS/DIAGNOSES
This patient has been assessed with a concern for Malnutrition and was treated during this hospitalization for the following Nutrition diagnosis/diagnoses:     -  05/12/2022: Severe protein-calorie malnutrition

## 2022-05-12 LAB
GLUCOSE BLDC GLUCOMTR-MCNC: 159 MG/DL — HIGH (ref 70–99)
GLUCOSE BLDC GLUCOMTR-MCNC: 164 MG/DL — HIGH (ref 70–99)
GLUCOSE BLDC GLUCOMTR-MCNC: 247 MG/DL — HIGH (ref 70–99)
GLUCOSE BLDC GLUCOMTR-MCNC: 268 MG/DL — HIGH (ref 70–99)

## 2022-05-12 PROCEDURE — 99232 SBSQ HOSP IP/OBS MODERATE 35: CPT

## 2022-05-12 PROCEDURE — 99222 1ST HOSP IP/OBS MODERATE 55: CPT

## 2022-05-12 PROCEDURE — 74230 X-RAY XM SWLNG FUNCJ C+: CPT | Mod: 26

## 2022-05-12 RX ORDER — BUDESONIDE AND FORMOTEROL FUMARATE DIHYDRATE 160; 4.5 UG/1; UG/1
2 AEROSOL RESPIRATORY (INHALATION)
Refills: 0 | Status: DISCONTINUED | OUTPATIENT
Start: 2022-05-12 | End: 2022-05-13

## 2022-05-12 RX ADMIN — MEXILETINE HYDROCHLORIDE 200 MILLIGRAM(S): 150 CAPSULE ORAL at 05:39

## 2022-05-12 RX ADMIN — LACTULOSE 15 GRAM(S): 10 SOLUTION ORAL at 13:26

## 2022-05-12 RX ADMIN — Medication 4 UNIT(S): at 08:13

## 2022-05-12 RX ADMIN — APIXABAN 5 MILLIGRAM(S): 2.5 TABLET, FILM COATED ORAL at 05:39

## 2022-05-12 RX ADMIN — Medication 500 MILLIGRAM(S): at 13:26

## 2022-05-12 RX ADMIN — MEXILETINE HYDROCHLORIDE 200 MILLIGRAM(S): 150 CAPSULE ORAL at 13:26

## 2022-05-12 RX ADMIN — Medication 150 MILLIGRAM(S): at 21:46

## 2022-05-12 RX ADMIN — Medication 4 UNIT(S): at 13:24

## 2022-05-12 RX ADMIN — CHLORHEXIDINE GLUCONATE 1 APPLICATION(S): 213 SOLUTION TOPICAL at 13:33

## 2022-05-12 RX ADMIN — Medication 1 SPRAY(S): at 08:56

## 2022-05-12 RX ADMIN — Medication 3 MILLILITER(S): at 21:41

## 2022-05-12 RX ADMIN — APIXABAN 5 MILLIGRAM(S): 2.5 TABLET, FILM COATED ORAL at 17:45

## 2022-05-12 RX ADMIN — INSULIN GLARGINE 10 UNIT(S): 100 INJECTION, SOLUTION SUBCUTANEOUS at 21:40

## 2022-05-12 RX ADMIN — Medication 3: at 08:13

## 2022-05-12 RX ADMIN — Medication 2: at 13:24

## 2022-05-12 RX ADMIN — TIOTROPIUM BROMIDE 1 CAPSULE(S): 18 CAPSULE ORAL; RESPIRATORY (INHALATION) at 13:25

## 2022-05-12 RX ADMIN — PANTOPRAZOLE SODIUM 40 MILLIGRAM(S): 20 TABLET, DELAYED RELEASE ORAL at 05:46

## 2022-05-12 RX ADMIN — MEXILETINE HYDROCHLORIDE 200 MILLIGRAM(S): 150 CAPSULE ORAL at 21:40

## 2022-05-12 RX ADMIN — POLYETHYLENE GLYCOL 3350 17 GRAM(S): 17 POWDER, FOR SOLUTION ORAL at 08:13

## 2022-05-12 RX ADMIN — BUDESONIDE AND FORMOTEROL FUMARATE DIHYDRATE 2 PUFF(S): 160; 4.5 AEROSOL RESPIRATORY (INHALATION) at 17:45

## 2022-05-12 RX ADMIN — Medication 3 MILLILITER(S): at 13:25

## 2022-05-12 RX ADMIN — Medication 1: at 18:23

## 2022-05-12 RX ADMIN — Medication 3 MILLILITER(S): at 05:39

## 2022-05-12 RX ADMIN — Medication 3 MILLILITER(S): at 17:45

## 2022-05-12 RX ADMIN — ATORVASTATIN CALCIUM 20 MILLIGRAM(S): 80 TABLET, FILM COATED ORAL at 21:40

## 2022-05-12 RX ADMIN — SENNA PLUS 2 TABLET(S): 8.6 TABLET ORAL at 21:41

## 2022-05-12 RX ADMIN — Medication 150 MILLIGRAM(S): at 08:13

## 2022-05-12 RX ADMIN — Medication 8 MILLIGRAM(S): at 05:39

## 2022-05-12 RX ADMIN — Medication 1 TABLET(S): at 13:26

## 2022-05-12 RX ADMIN — ZINC SULFATE TAB 220 MG (50 MG ZINC EQUIVALENT) 220 MILLIGRAM(S): 220 (50 ZN) TAB at 13:25

## 2022-05-12 RX ADMIN — Medication 4 UNIT(S): at 18:23

## 2022-05-12 NOTE — PROGRESS NOTE ADULT - PROBLEM SELECTOR PLAN 1
Wound is on right medial malleolus with no redness or warmth and appears to be healing somewhat with eschar. Very low concern for overlying cellulitis. Very low concern for osteomyelitis. Inflammatory markers are not too elevated. No fever or leukocytosis  -MRI neg for osteo  -Podiatryrecommending dry sterile dressing to R foot daily with gauze and yesenia, Z flow offloading boots while in bed b/l
Wound is on right medial malleolus with no redness or warmth and appears to be healing somewhat with eschar. Very low concern for overlying cellulitis. Very low concern for osteomyelitis. Inflammatory markers are not too elevated. No fever or leukocytosis    -Podiatry consult: likely local wound care  -MRI ankle ordered to rule out deep infection per Podiatry recommendations

## 2022-05-12 NOTE — DIETITIAN INITIAL EVALUATION ADULT - ETIOLOGY
Increased physiological demand for nutrients  decreased ability to consume adequate protein-energy in setting of increased physiological demand for nutrients

## 2022-05-12 NOTE — SWALLOW VFSS/MBS ASSESSMENT ADULT - ASPIRATION PRECAUTIONS
Monitor for s/s aspiration/laryngeal penetration. If noted:  D/C p.o. intake, provide non-oral nutrition/hydration/meds, and contact this service @ [id number]

## 2022-05-12 NOTE — SWALLOW VFSS/MBS ASSESSMENT ADULT - ORAL PHASE
Trace premature spillage to the pyriform sinuses within functional limits +Piecemeal deglutition; mild oral residue, pt requested liquid wash to clear

## 2022-05-12 NOTE — CONSULT NOTE ADULT - SUBJECTIVE AND OBJECTIVE BOX
Podiatry pager #: [phone] ([place])/ [phone] ([facility])    Patient is a 71y old  Female who presents with a chief complaint of right foot wound     HPI:  tracheobronchomalasia s/p tracheobronchoplasty, bronchiectasis, severe persistent asthma (steroid dependent), adrenal insuffiencey on chronic steroids, DM2, HTN, colorectal cancer s/p total colectomy now with colostomy, PAF on Eliquis, PVD, stage 3 sacral pressure ulcer p/w R foot pain. Pt has R foot ulcer which began weeping dark pus on Friday associated w/worsening R foot pain now 8/10 nonradiating, partially relieved by percocet (last dose this AM pre-arrival). Rehab physician Dr. Feng sent pt in to [facility] for OM. Pt also states PMD requested modified barium swallow for unrelated reasons if admitted. She denies HA, CP, abd pain, n/v/d/c, dysuria/hematuria, hematochezia/melena, fevers/chills, focal weakness, new numbness/tingling, swelling.     71 F w/  tracheobronchomalasia s/p tracheobronchoplasty, bronchiectasis, severe persistent asthma (steroid dependent), adrenal insuffiencey on chronic steroids, DM2, HTN, colorectal cancer s/p total colectomy now with colostomy, PAF on Eliquis,  recent hospitalizations for pneumonia, presents from  rehab ([facility] in [place])  for dysphagia  x 1 day        No tracking soft tissue gas collections, radiopaque foreign bodies, or   gross radiographic evidence for osteomyelitis.     (10 May 2022 18:26)      PAST MEDICAL & SURGICAL HISTORY:  Atrial fibrillation  paroxysmal, on eliquis      Diabetes  Type 2      COPD (chronic obstructive pulmonary disease)      Adrenal insufficiency  Medrol daily for over 50 years      Aortic insufficiency  moderate AR on echo 5/3/2018      Pelvic fracture      Asthma      Tracheobronchomalacia  diagnosed 2015, s/p bronchial thermoplasty 2016 (Dr Zapien); recent bronchoscopy 6/5/2018 revealed no evidence of tracheobronchomalacia in trachea or bronchial tubes      Colorectal cancer  4/2018- last treatment , chemo and radiation      Rectal bleeding      Seizure  x 1 1/7/18      DVT (deep venous thrombosis)  15-20 years ago, took coumadin      TIA (transient ischemic attack)  multiple, last 5 years ago - presents as right-sided weakness      History of partial hysterectomy  30 years ago - fibroids      H/O total knee replacement, bilateral  5 years ago      History of sinus surgery  multiple sinus surgeries      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      H/O pelvic surgery  5 years ago - s/p fracture      History of tracheomalacia  2015 - attempted tracheal stenting ([facility])- course complicated by obstruction, respiratory failure, multiple CPR attempts -  stent discontinued; 10/20/2016 Tracheobronchoplasty (Prolene Mesh) performed at [facility] by Dr Zapien      S/P bronchoscopy  6/5/2018 - [place] (Dr Zapien) no evidence of tracheobronchomalacia in trachea or bronchial tubes      Rectal bleeding  exam under anesthesia ([facility]) 2/2018          MEDICATIONS  (STANDING):  albuterol/ipratropium for Nebulization 3 milliLiter(s) Nebulizer every 6 hours  apixaban 5 milliGRAM(s) Oral two times a day  ascorbic acid 500 milliGRAM(s) Oral daily  atorvastatin 20 milliGRAM(s) Oral at bedtime  dextrose 5%. 1000 milliLiter(s) (100 mL/Hr) IV Continuous <Continuous>  dextrose 5%. 1000 milliLiter(s) (50 mL/Hr) IV Continuous <Continuous>  dextrose 50% Injectable 25 Gram(s) IV Push once  dextrose 50% Injectable 12.5 Gram(s) IV Push once  dextrose 50% Injectable 25 Gram(s) IV Push once  fluticasone propionate 50 MICROgram(s)/spray Nasal Spray 1 Spray(s) Both Nostrils two times a day  glucagon  Injectable 1 milliGRAM(s) IntraMuscular once  insulin glargine Injectable (LANTUS) 10 Unit(s) SubCutaneous at bedtime  insulin lispro (ADMELOG) corrective regimen sliding scale   SubCutaneous three times a day before meals  insulin lispro (ADMELOG) corrective regimen sliding scale   SubCutaneous at bedtime  insulin lispro Injectable (ADMELOG) 4 Unit(s) SubCutaneous three times a day before meals  lactulose Syrup 15 Gram(s) Oral daily  methylPREDNISolone 8 milliGRAM(s) Oral daily  mexiletine 200 milliGRAM(s) Oral three times a day  multivitamin 1 Tablet(s) Oral daily  pantoprazole    Tablet 40 milliGRAM(s) Oral before breakfast  polyethylene glycol 3350 17 Gram(s) Oral two times a day  pregabalin 150 milliGRAM(s) Oral two times a day  senna 2 Tablet(s) Oral at bedtime  tiotropium 18 MICROgram(s) Capsule 1 Capsule(s) Inhalation daily  zinc sulfate 220 milliGRAM(s) Oral daily    MEDICATIONS  (PRN):  acetaminophen     Tablet .. 650 milliGRAM(s) Oral every 6 hours PRN Temp greater or equal to 38C (100.4F), Mild Pain (1 - 3), Moderate Pain (4 - 6)  dextrose Oral Gel 15 Gram(s) Oral once PRN Blood Glucose LESS THAN 70 milliGRAM(s)/deciliter  oxyCODONE    IR 2.5 milliGRAM(s) Oral every 6 hours PRN Severe Pain (7 - 10)      Allergies    aspirin (Short breath)  Avelox (Short breath; Pruritus)  codeine (Short breath)  Dilaudid (Short breath)  iodine (Short breath; Swelling)  penicillin (Unknown)  shellfish (Anaphylaxis)  tetanus toxoid (Short breath)  Valium (Short breath)    Intolerances        VITALS:    Vital Signs Last 24 Hrs  T(C): 36.9 (10 May 2022 16:48), Max: 36.9 (10 May 2022 11:26)  T(F): 98.4 (10 May 2022 16:48), Max: 98.4 (10 May 2022 11:26)  HR: 74 (10 May 2022 16:48) (73 - 74)  BP: 151/70 (10 May 2022 16:48) (133/84 - 151/70)  BP(mean): --  RR: 16 (10 May 2022 16:48) (16 - 18)  SpO2: 99% (10 May 2022 16:48) (97% - 100%)    LABS:                          10.3   7.92  )-----------( 328      ( 10 May 2022 12:26 )             35.8       05-10    142  |  106  |  7   ----------------------------<  154<H>  3.7   |  24  |  0.40<L>    Ca    8.9      10 May 2022 12:26    TPro  5.6<L>  /  Alb  2.9<L>  /  TBili  0.2  /  DBili  x   /  AST  15  /  ALT  13  /  AlkPhos  62  05-10      CAPILLARY BLOOD GLUCOSE          PT/INR - ( 10 May 2022 12:26 )   PT: 13.7 sec;   INR: 1.19 ratio         PTT - ( 10 May 2022 12:26 )  PTT:31.3 sec    LOWER EXTREMITY PHYSICAL EXAM:    Vascular: DP/PT 1/4 B/L, CFT <3 seconds B/L, Temperature gradient warm to cool B/L  Neuro: Epicritic sensation intact to the level of midfoot B/L  Musculoskeletal/Ortho: unremarkable   Skin: right foot medial navicular wound w/ overlying well adhered eschar, no periwound erythema, no malodor, no bogginess, no drainage, no acute signs of infection     RADIOLOGY & ADDITIONAL STUDIES:    < from: Xray Foot AP + Lateral + Oblique, Right (05.10.22 @ 10:37) >    ACC: [id number] EXAM:  XR FOOT COMP MIN 3 VIEWS RT                          PROCEDURE DATE:  05/10/2022          INTERPRETATION:  CLINICAL INDICATION: right foot diabetic ulcer with pain   and purulence    EXAM:  Frontal, oblique, and lateral right foot from 5/10/2022 at 1037. No   similar prior studies available for comparison.    IMPRESSION:  No tracking soft tissue gas collections, radiopaque foreign bodies, or   gross radiographic evidence for osteomyelitis.    No fractures or dislocations.    Tarsometatarsal alignment maintained without evidence for a Lisfranc   injury.    Congenitally fused 5th DIP joint. Appearance of an old 5th PIP   arthroplasty defect, correlate clinically. Preserved remaining visualized   joint spaces and no joint margin erosions.    No lytic or blastic lesions.    --- End of Report ---            CARMEN ZAMORA MD; Attending Radiologist  This document has been electronically signed. May 10 2022 12:14PM    < end of copied text >  
EVALUATION ON BEHALF OF DR. 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 She has had multiple positive past sputum cultures and most recently with pseudomonas. She presents from rehab with a worsening RLE wound. She has been seen by Podiatry and Wound care.   - She is being planned for discharge potentially tomorrow    She denies chills or urin symptoms. She has dysphagia. She has a chronic cough which is productive of green sputum. She feels that her respiratory status is a at baseline and denies complaints. She has had intermittent dysphagia symptoms and is being seen by Speech/Swallow. She does not want a modified diet.     She has had asthma since age 12. On prior hospitalization she was treated with Symbicort, Spiriva, and Duonebs Q6. She previously received Xolair as an outpatient and now has been on Dupixent. She has been on Mucomyst, Performist, Budesonide, Breo-Ellipta, and Accolate at varying times as an outpatient as well. She has a chronic cough and feels tight when she does not use her airway clearance therapies. She has a chest vest but does not use it presently. She denies sick contacts but was recently in a hospital in [facility] 3/2022 for pneumonia and required intubation and then hospitalized at [facility] on 3/28/22 and at that time was noted to have thrush as well as bacterial pneumonia for which she was treated with IV antibiotics    She denies chest pain or palpitations. She denies nausea or vomiting. She is passing gas.     ====================BACKGROUND INFORMATION============================    FAMILY HISTORY: FAMILY HISTORY:  Family history of asthma  Family history of breast cancer (Sibling)  Family history of diabetes mellitus type II        PAST MEDICAL AND SURGICAL HISTORY: PAST MEDICAL & SURGICAL HISTORY:  Atrial fibrillation  paroxysmal, on eliquis  Diabetes  Type 2  COPD (chronic obstructive pulmonary disease)  Adrenal insufficiency  Medrol daily for over 50 years  Aortic insufficiency  moderate AR on echo 5/3/2018  Pelvic fracture  Asthma  Tracheobronchomalacia  diagnosed 2015, s/p bronchial thermoplasty 2016 (Dr Zapien); recent bronchoscopy 6/5/2018 revealed no evidence of tracheobronchomalacia in trachea or bronchial tubes  Colorectal cancer  4/2018- last treatment , chemo and radiation  Rectal bleeding  Seizure  x 1 1/7/18  DVT (deep venous thrombosis)  15-20 years ago, took coumadin  TIA (transient ischemic attack)  multiple, last 5 years ago - presents as right-sided weakness  History of partial hysterectomy  30 years ago - fibroids  H/O total knee replacement, bilateral  5 years ago  History of sinus surgery  multiple sinus surgeries  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  H/O pelvic surgery  5 years ago - s/p fracture    History of tracheomalacia  2015 - attempted tracheal stenting ([facility])- course complicated by obstruction, respiratory failure, multiple CPR attempts -  stent discontinued; 10/20/2016 Tracheobronchoplasty (Prolene Mesh) performed at [facility] by Dr Zapien  S/P bronchoscopy  6/5/2018 - [place] (Dr Zapien) no evidence of tracheobronchomalacia in trachea or bronchial tubes  Rectal bleeding  exam under anesthesia ([facility]) 2/2018          ALLERGIES:Allergies    aspirin (Short breath)  Avelox (Short breath; Pruritus)  codeine (Short breath)  Dilaudid (Short breath)  iodine (Short breath; Swelling)  penicillin (Unknown)  shellfish (Anaphylaxis)  tetanus toxoid (Short breath)  Valium (Short breath)    Intolerances    HOME MEDICATIONS: Reviewed     OUTPATIENT PULMONARY DOCTOR: Otoniel Spring MD    SOCIAL HISTORY  TOBACCO USE: denied   ALCOHOL: denied   DRUGS: denied   MARITAL STATUS:    EMPLOYMENT:    EXPOSURES: none   RECENT TRAVELS: none   CODE STATUS: full code     ====================REVIEW OF SYSTEMS=====================================  CONSTITUTIONAL: RLE wound  CARDIOVASCULAR: Denies chest pain or palpitations   PULMONARY: Chronic cough with sputum, no hemoptysis   GASTROINTESTINAL: Denies nausea, vomiting or abdominal pain  [x ALL OTHER REVIEW OF SYSTEMS ARE NEGATIVE   [] UNABLE TO OBTAIN REVIEW OF SYSTEMS DUE TO ______________      ====================PHYSICAL EXAM=========================================    VITALS: ICU Vital Signs Last 24 Hrs  T(C): 37.1 (12 May 2022 00:40), Max: 37.1 (11 May 2022 23:00)  T(F): 98.8 (12 May 2022 00:40), Max: 98.8 (12 May 2022 00:40)  HR: 85 (12 May 2022 00:40) (76 - 89)  BP: 114/50 (12 May 2022 00:40) (111/68 - 140/78)  RR: 18 (12 May 2022 00:40) (18 - 18)  SpO2: 98% (12 May 2022 00:40) (96% - 98%)      INTAKE and OUTPUT: I&O's Summary      WEIGHT: Daily Height in cm: 170.18 (12 May 2022 00:40)    Daily     GLUCOSE: CAPILLARY BLOOD GLUCOSE  POCT Blood Glucose.: 247 mg/dL (12 May 2022 12:40)    VENTILATOR SETTINGS: N/A    GENERAL: awake, interactive, NAD  HEENT: NCAT, EOMI, anicteric, MMM, nares clear, trachea midline  NECK:  supple, no JVD  LYMPH NODES: no palpable supraclavicular LAD  CARDIOVASCULAR: irreg irreg, S1S2  PULMONARY: good air entry, no accessory muscle use, coarse BS at base, no wheeze  ABDOMEN: soft, NT, + colostomy  SKIN: warm and dry   EXTREMITIES: no clubbing or cyanosis   NEUROLOGIC: nonfocal exam  PSYCHIATRIC: calm and in good spirits    ====================MEDICATIONS===========================================  MEDICATIONS  (STANDING):  albuterol/ipratropium for Nebulization 3 milliLiter(s) Nebulizer every 6 hours  apixaban 5 milliGRAM(s) Oral two times a day  ascorbic acid 500 milliGRAM(s) Oral daily  atorvastatin 20 milliGRAM(s) Oral at bedtime  chlorhexidine 2% Cloths 1 Application(s) Topical daily  dextrose 5%. 1000 milliLiter(s) (100 mL/Hr) IV Continuous <Continuous>  dextrose 5%. 1000 milliLiter(s) (50 mL/Hr) IV Continuous <Continuous>  dextrose 50% Injectable 25 Gram(s) IV Push once  dextrose 50% Injectable 12.5 Gram(s) IV Push once  dextrose 50% Injectable 25 Gram(s) IV Push once  fluticasone propionate 50 MICROgram(s)/spray Nasal Spray 1 Spray(s) Both Nostrils two times a day  glucagon  Injectable 1 milliGRAM(s) IntraMuscular once  insulin glargine Injectable (LANTUS) 10 Unit(s) SubCutaneous at bedtime  insulin lispro (ADMELOG) corrective regimen sliding scale   SubCutaneous three times a day before meals  insulin lispro (ADMELOG) corrective regimen sliding scale   SubCutaneous at bedtime  insulin lispro Injectable (ADMELOG) 4 Unit(s) SubCutaneous three times a day before meals  lactulose Syrup 15 Gram(s) Oral daily  methylPREDNISolone 8 milliGRAM(s) Oral daily  mexiletine 200 milliGRAM(s) Oral three times a day  multivitamin 1 Tablet(s) Oral daily  pantoprazole    Tablet 40 milliGRAM(s) Oral before breakfast  polyethylene glycol 3350 17 Gram(s) Oral two times a day  pregabalin 150 milliGRAM(s) Oral two times a day  senna 2 Tablet(s) Oral at bedtime  tiotropium 18 MICROgram(s) Capsule 1 Capsule(s) Inhalation daily  zinc sulfate 220 milliGRAM(s) Oral daily      MEDICATIONS  (PRN):  acetaminophen     Tablet .. 650 milliGRAM(s) Oral every 6 hours PRN Temp greater or equal to 38C (100.4F), Mild Pain (1 - 3), Moderate Pain (4 - 6)  dextrose Oral Gel 15 Gram(s) Oral once PRN Blood Glucose LESS THAN 70 milliGRAM(s)/deciliter  oxyCODONE    IR 2.5 milliGRAM(s) Oral every 6 hours PRN Severe Pain (7 - 10)      ====================LABORATORY RESULTS====================================  CBC Full  -  ( 11 May 2022 06:10 )  WBC Count : 6.87 K/uL  RBC Count : 4.61 M/uL  Hemoglobin : 10.4 g/dL  Hematocrit : 34.9 %  Platelet Count - Automated : 310 K/uL  Mean Cell Volume : 75.7 fl  Mean Cell Hemoglobin : 22.6 pg  Mean Cell Hemoglobin Concentration : 29.8 gm/dL                                      05-11    142  |  104  |  7   ----------------------------<  129<H>  3.3<L>   |  26  |  0.39<L>    Ca    8.5      11 May 2022 06:09        Creatinine Trend: 0.39<--, 0.40<--, 0.37<--, 0.35<--, 0.40<--, 0.34<--      ====================RADIOLOGY and ECHOCARDIOGRAPHY=======================    CXR:< from: Xray Chest 1 View- PORTABLE-Urgent (Xray Chest 1 View- PORTABLE-Urgent .) (05.10.22 @ 11:56) >  FINDINGS:    Right chest wall port tip is in the SVC.  The lungs are clear.  No pleural effusion or pneumothorax.  There is limited evaluation of the heart size and mediastinum on portable   technique.  No acute abnormality within visible osseous structures.      IMPRESSION:    Right chest wall port tip is in the SVC.    < end of copied text >      CT CHEST:< from: CT Chest No Cont (03.28.22 @ 21:49) >  FINDINGS:    Lungs/Airways/Pleura: The central airways are patent. No pleural   effusion. Since 2/9/2022 chest CT, overall worsening diffuse bronchial   mucoid impaction, groundglass and tree-in-bud nodularity involving the   right greater than left lung on a background of bronchiectasis and   bronchial wall thickening, with new focal consolidation at the posterior   basal right lower lobe.    Mediastinum/Lymph nodes: No thoracic adenopathy.    Heart and Vessels: Right chest wall port with tip terminating in the   distal SVC. Mid ascending aorta measures 4 cm. The pulmonary artery   measures 3.3 cm. The heart is normal in size. Mild coronary arterial   calcification is present. Aortic valvular and mitral annular   calcification also noted. No pericardial effusion.    Upper Abdomen: Stable cystic nodules arising from the pancreatic tail   measuring up to 2.1 cm.    Osseous structures and Soft Tissues: Stable numerous scattered sclerotic   lesions throughout the thoracolumbar spine since 2016, likely bone   islands.    IMPRESSION:  Compared to 2/9/2022 chest CT, worsening extensive bronchial impaction   and tree-in-bud nodularity with new right lower lobe consolidation on a   background of bronchiectasis.    < end of copied text >      ECHOCARDIOGRAPHY:

## 2022-05-12 NOTE — DIETITIAN INITIAL EVALUATION ADULT - DIET TYPE
Glucerna 2x/day (440 jhonatan, 20 Gm protein) + Isidro 2x/day/consistent carbohydrate (no snacks)/minced and moist/supplement (specify)

## 2022-05-12 NOTE — DIETITIAN INITIAL EVALUATION ADULT - PERSON TAUGHT/METHOD
Pt able to teach back carbohydrate counting education points. Endorses good understanding of DM nutrition therapy education, declining in depth review or literature at this time./verbal instruction/individual instruction/teach back - (Patient repeats in own words)/patient instructed

## 2022-05-12 NOTE — DIETITIAN INITIAL EVALUATION ADULT - PERTINENT MEDS FT
MEDICATIONS  (STANDING):  albuterol/ipratropium for Nebulization 3 milliLiter(s) Nebulizer every 6 hours  apixaban 5 milliGRAM(s) Oral two times a day  ascorbic acid 500 milliGRAM(s) Oral daily  atorvastatin 20 milliGRAM(s) Oral at bedtime  chlorhexidine 2% Cloths 1 Application(s) Topical daily  dextrose 5%. 1000 milliLiter(s) (100 mL/Hr) IV Continuous <Continuous>  dextrose 5%. 1000 milliLiter(s) (50 mL/Hr) IV Continuous <Continuous>  dextrose 50% Injectable 25 Gram(s) IV Push once  dextrose 50% Injectable 12.5 Gram(s) IV Push once  dextrose 50% Injectable 25 Gram(s) IV Push once  fluticasone propionate 50 MICROgram(s)/spray Nasal Spray 1 Spray(s) Both Nostrils two times a day  glucagon  Injectable 1 milliGRAM(s) IntraMuscular once  insulin glargine Injectable (LANTUS) 10 Unit(s) SubCutaneous at bedtime  insulin lispro (ADMELOG) corrective regimen sliding scale   SubCutaneous three times a day before meals  insulin lispro (ADMELOG) corrective regimen sliding scale   SubCutaneous at bedtime  insulin lispro Injectable (ADMELOG) 4 Unit(s) SubCutaneous three times a day before meals  lactulose Syrup 15 Gram(s) Oral daily  methylPREDNISolone 8 milliGRAM(s) Oral daily  mexiletine 200 milliGRAM(s) Oral three times a day  multivitamin 1 Tablet(s) Oral daily  pantoprazole    Tablet 40 milliGRAM(s) Oral before breakfast  polyethylene glycol 3350 17 Gram(s) Oral two times a day  pregabalin 150 milliGRAM(s) Oral two times a day  senna 2 Tablet(s) Oral at bedtime  tiotropium 18 MICROgram(s) Capsule 1 Capsule(s) Inhalation daily  zinc sulfate 220 milliGRAM(s) Oral daily    MEDICATIONS  (PRN):  acetaminophen     Tablet .. 650 milliGRAM(s) Oral every 6 hours PRN Temp greater or equal to 38C (100.4F), Mild Pain (1 - 3), Moderate Pain (4 - 6)  dextrose Oral Gel 15 Gram(s) Oral once PRN Blood Glucose LESS THAN 70 milliGRAM(s)/deciliter  oxyCODONE    IR 2.5 milliGRAM(s) Oral every 6 hours PRN Severe Pain (7 - 10)

## 2022-05-12 NOTE — ADVANCED PRACTICE NURSE CONSULT - RECOMMEDATIONS
Will recommend the followin. Sacrum: cavilon to periwound skin, Medihoney paste to the wound- cover with foam - change every other day and prn for drainage  2. continue to encourage mobility, T&P  3. Elevate heels  4. Right foot wound as per Podiatry  5. nutrition support as pt condition allows  Tx plan discussed with RN

## 2022-05-12 NOTE — SWALLOW VFSS/MBS ASSESSMENT ADULT - SLP PERTINENT HISTORY OF CURRENT PROBLEM
71F with PMHx of severe persistent asthma (steroid dependent with adrenal insufficiency secondary to chronic use), T2DM, HTN, colorectal cancer (with colostomy), tracheobronchomalacia (post-tracheobronchoplasty), bronchiectasis, paroxysmal atrial fibrillation and PVD sent in by [facility] physician to rule out infection of chronic right medial malleolus wound. Patient states wound on right foot near medial malleolus started in January 2022 around the time she was admitted to [facility] for pneumonia and was intubated. Since then has never truly healed. Recently admitted to [facility] twice in past two months. First admission for pneumonia and bronchiectasis exacerbation. Second admission for stuck food bolus. On that admission patient had EGD & seen by S&S. Patient states she started noticing increasing pain of right foot wound several days prior. Notice more discharge. She thinks they were wrapping the wound too tightly at rehab and skin was being sloughed off.

## 2022-05-12 NOTE — DIETITIAN INITIAL EVALUATION ADULT - ENERGY INTAKE
Good appetite but poor PO intake as Pt was on puree food diet and does not accept texture-modified foods. Now s/p MBS today 05/12 recommending regular texture foods and thin liquids. RD provided menu to Pt at bedside to order preferred foods PRN. Of note, Pt has been eating regular-texture foods from home since admission. Food preferences obtained; RD to honor as able.     Would like to continue Glucerna 2x/day (440 jhonatan, 20 Gm protein) to optimize PO intake.  Poor (<50%)

## 2022-05-12 NOTE — DIETITIAN INITIAL EVALUATION ADULT - PERTINENT LABORATORY DATA
05-11    142  |  104  |  7   ----------------------------<  129<H>  3.3<L>   |  26  |  0.39<L>    Ca    8.5      11 May 2022 06:09    POCT Blood Glucose.: 247 mg/dL (05-12-22 @ 12:40)  A1C with Estimated Average Glucose Result: 8.0 % (05-10-22 @ 12:26)  A1C with Estimated Average Glucose Result: 9.5 % (03-29-22 @ 13:50)  A1C with Estimated Average Glucose Result: 7.8 % (11-30-21 @ 09:05)

## 2022-05-12 NOTE — CONSULT NOTE ADULT - ASSESSMENT
70yo F w/ right medial navicular wound  - pt seen and evaluated  - afebrile, WBC 7.92, ESR 32, CRP 0.4   - right foot medial navicular wound w/ overlying well adhered eschar, no periwound erythema, no malodor, no bogginess, no drainage, no acute signs of infection   - Right foot xray showing no OM, no gas  - ordered R foot MR r/o OM   - no wound culture taken 2/2 no signs of infection  - pod plan likely local wound care pending MR results  - discussed w/ attending 
71F extensive medical history including rectal adenocarcinoma s/p colectomy/colostomy, TBM s/p tracheobplasty, severe persistent asthma (Dupixent and steroids), bronchiectasis and chronic bronchitis, and dysphagia who presents with the worsening RLE wound.    1. Right Lower Extremity Wound  - Podiatry followup  - Wound care followup    2. Severe persistent asthma - steroid dependent (also with adrenal insufficiency)  - no acute respiratory distress  - maintain O2 sat > 88%  - incentive spirometer and acapella to facilitate breathing. If sputum develops may benefit from chest vest  - Continue Medrol at home dose  - Continue Dupixent as an outpatient  - Patient has been treated with multiple bronchodilators in the past: can use Symbicort, Spiriva, Accolate, and Duonebs while hospitalized - has been on dual anticholinergics  - outpatient followup with Dr. Allison    3. Tracheobronchomalacia s/p tracheoplasty in the past with Dr. Zapien at [facility]    4. Chronic bronchitis and chronic cough  - continue bronchodilator therapy  - would start airway clearance  - also a component of sensory neuropathic cough - on Amitryptiline    5. Infectious Disease   - recent hospitalizations at [facility] and then [facility] in March 2022 with bacterial pneumonia treated with antibiotics  - currently appears nontoxic appearing   - currently on isolation for prior MRSA pneumonia/infection  - RLE wound    6. GERD and dysphagia  - PPI  - Swallow evaluation/followup  - Aspiration precautions    7. Rhinitis  - Can start Flonase    8. Cardiovascular - history of Afib on AC  - Continue Mexilitene for prior PVCs    9. Prophylaxis  - DVT proph - AC   - GI proph  - Aspiration precautions  - Maintain O2 sat > 88%  - Incentive spirometer and acapella

## 2022-05-12 NOTE — SWALLOW VFSS/MBS ASSESSMENT ADULT - COMMENTS
Has been tolerating a pureed diet with no gross aspiration, but states he appetite is limited because the pureed diet at [facility] is horrible. Patient seen by podiatry for possible infection. Foot XR showed no tracking soft tissue gas collections or gross radiographic evidence of osteomyelitis.   Problem: Dysphagia:  -Plan: Seen by S&S prior and deferred MBSS because patient pending placement. Had EGD/FEES. Concern for recurrent aspiration.  -Pureed diet with thin liquids for now  -S&S eval for re-evaluation.    Pt known to this service. Seen most recently for bedside swallow evals on 4/26 and 4/28 with recommendations for a puree diet/ thin liquids; also discussed instrumental exam however patient declined exam. Seen for FEES on 5/11/22, with recommendations for puree/ thin liquids, as pt with laryngeal penetration of minced/moist textures without immediate retrieval.

## 2022-05-12 NOTE — DIETITIAN INITIAL EVALUATION ADULT - REASON FOR ADMISSION
Per chart "71F with PMHx of severe persistent asthma (steroid dependent with adrenal insufficiency secondary to chronic use), T2DM, HTN, colorectal cancer (with colostomy), tracheobronchomalacia (post-tracheobronchoplasty), bronchiectasis, paroxysmal atrial fibrillation and PVD sent in by [facility] physician to rule out infection of chronic right medial malleolus wound. "

## 2022-05-12 NOTE — DIETITIAN INITIAL EVALUATION ADULT - OTHER INFO
Hx of DM managed with Lantus (1x/day) and Admelong (pre-prandial). Fingersticks range in setting of Pt reported brittle diabetes (pre-prandial 99 Mg/dl - 200 Mg/dl). Hgba1c (most recent 8.0%) in setting of complicated medical course x 2 months PTA (including ICU admission secondary to acute hypoxic respiratory failure requiring intubation), past Hgba1c also higher than target range (9.5% 03/2022, 7.8% 11/2021). In-house fingersticks elevated (? secondary to food from home). Of note, chronic steroids PTA and receiving in-house --> steroid exacerbated hyperglycemia?.  RD to continue to monitor blood glucose trends as available/able.

## 2022-05-12 NOTE — DIETITIAN INITIAL EVALUATION ADULT - ADD RECOMMEND
1) New malnutrition notification sent.  2) Continue current diet as ordered/tolerated. Continue Glucerna 2x/day.  3) Add Isidro 2x/day for wound healing, continue multivitamin and vitamin C.  4) Encourage adequate consumption of meals/supplements to optimize protein-energy intake.   5) Food preferences obtained; RD to honor as able.   6) Monitor nutritional intake/tolerance, weights, labs, hydration status, skin integrity, BM, GI symptoms.   7) Review DM nutrition therapy education as appropriate.

## 2022-05-12 NOTE — DIETITIAN INITIAL EVALUATION ADULT - NS FNS DIET ORDER
Diet, Regular:   Consistent Carbohydrate {Evening Snack} (CSTCHOSN)  Supplement Feeding Modality:  Oral  Glucerna Shake Cans or Servings Per Day:  1       Frequency:  Two Times a day (05-12-22 @ 12:19)

## 2022-05-12 NOTE — PROGRESS NOTE ADULT - PROBLEM/PLAN-4
Problem: At Risk for Falls  Goal: # Patient does not fall  Outcome: Outcome Met, Continue evaluating goal progress toward completion  Call light within reach. Bed alarm on. Hourly rounding with monitoring of 5 Ps.      
DISPLAY PLAN FREE TEXT
DISPLAY PLAN FREE TEXT

## 2022-05-12 NOTE — DIETITIAN NUTRITION RISK NOTIFICATION - TREATMENT: THE FOLLOWING DIET HAS BEEN RECOMMENDED
Diet, Regular:   Consistent Carbohydrate {Evening Snack} (CSTCHOSN)  Isidro(7 Gm Arginine/7 Gm Glut/1.2 Gm HMB     Qty per Day:  2  Supplement Feeding Modality:  Oral  Glucerna Shake Cans or Servings Per Day:  2       Frequency:  Daily (05-12-22 @ 15:35) [Pending Verification By Attending]  Diet, Regular:   Consistent Carbohydrate {Evening Snack} (CSTCHOSN)  Supplement Feeding Modality:  Oral  Glucerna Shake Cans or Servings Per Day:  1       Frequency:  Two Times a day (05-12-22 @ 12:19) [Active]

## 2022-05-12 NOTE — PHYSICAL THERAPY INITIAL EVALUATION ADULT - PERTINENT HX OF CURRENT PROBLEM, REHAB EVAL
Pt is a 71 y.o. F with PMHx of severe persistent asthma (steroid dependent with adrenal insufficiency secondary to chronic use), T2DM, HTN, colorectal cancer (with colostomy), tracheobronchomalacia (post-tracheobronchoplasty), bronchiectasis, paroxysmal atrial fibrillation and PVD sent in by [facility] physician to rule out infection of chronic right medial malleolus wound.

## 2022-05-12 NOTE — DIETITIAN INITIAL EVALUATION ADULT - NS FNS WEIGHT CHANGE REASON
Weight Hx per Pt:   - UBW: 150 pounds (prior to 03/2022)  - Reported weight changes: weight loss x 2 months in setting of poor PO intake    Weight Hx current admit:  - Dosing weight: 135 pounds (05/10)  - Bedscale weight at time of RD visit: 129.5 pounds (05/12)    Weight Change:  - 14% weight loss  x 2 months    **  Will continue to monitor weight trends as available/able.     IBW: 135 pounds   %IBW: 96%

## 2022-05-12 NOTE — SWALLOW VFSS/MBS ASSESSMENT ADULT - DIAGNOSTIC IMPRESSIONS
Pt presents with a functional oropharyngeal swallow. Bolus transfer adequate. No laryngeal penetration/aspiration across textures. Pharyngeal clearance is complete. 2.08

## 2022-05-12 NOTE — DIETITIAN INITIAL EVALUATION ADULT - NSICDXPASTSURGICALHX_GEN_ALL_CORE_FT
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 by Dr Zapien    Rectal bleeding exam under anesthesia ([facility]) 2/2018    S/P bronchoscopy 6/5/2018 - [facility] (Dr Zapien) no evidence of tracheobronchomalacia in trachea or bronchial tubes

## 2022-05-12 NOTE — DIETITIAN INITIAL EVALUATION ADULT - NSFNSNUTRHOMESUPPLEMENTFT_GEN_A_CORE
February 15, 2022      [facility]  [address] [place] [address]  Phone: [phone]  Fax: [phone]       Patient: Juan Jeffery   YOB: 1975  Date of Visit: 02/15/2022    To Whom It May Concern:    Judith Jeffery  was at [facility] on 02/15/2022. The patient may return to work/school on 02/17/2022 with no restrictions. If you have any questions or concerns, or if I can be of further assistance, please do not hesitate to contact me.    Sincerely,    Severo Allen, CMA      Micronutrient/Other supplementation: none. Protein-energy supplementation: Glucerna at home occasionally (as meal replacement)

## 2022-05-12 NOTE — PROGRESS NOTE ADULT - PROBLEM SELECTOR PLAN 4
-Continue with basal-bolus  -FS TID AC & insulin sliding scale
-Continue with basal-bolus  -FS TID AC & insulin sliding scale

## 2022-05-12 NOTE — DIETITIAN INITIAL EVALUATION ADULT - SIGNS/SYMPTOMS
ASSESSMENT/ PLAN:    PCOS (polycystic ovarian syndrome)  Plan for short term cycling on OCP then stopping to try for pregnancy.    - norethindrone-ethinyl estradiol-FE (MICROGESTIN FE 1/20) 1-20 MG-MCG per tablet; Take 1 tablet by mouth daily.  Dispense: 28 tablet; Refill: 2    Migraine without status migrainosus, not intractable, unspecified migraine type  Plan to wean Topamax slowly to avoid any rebound headaches.    - topiramate (TOPAMAX) 25 MG tablet; Take every other night x 2 weeks then every 3rd night for another week, then off  Dispense: 30 tablet; Refill: 0    Iron deficiency  Recheck iron levels.    - IRON AND TIBC; Future      See Patient Instruction section  Return in about 3 months (around 5/22/2018) for Well woman visit/physical.    ___________________________________________________  SUBJECTIVE:  Kayla is a 30 year old female who is seen for follow-up of PCOS and weight loss previously on Phentermine.  The patient has been weaning her Phentermine as she and her  have decided to try for another child.  They have one son together who is now close to 10 years old.  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  She will also need to wean the Topamax that she has been taking at a low dose for migraine prevention due to its potential to cause birth defects.  She states that her periods are still irregular.  She had a period in October and one in November, then skipped December then had one in January.  She previously got pregnant the first time after cycling on OCPs then stopping them.  She is also be due for follow-up labs.      Nursing notes reviewed and accepted.     REVIEW OF SYSTEMS:  In addition to that noted above, review of systems is remarkable for:  No chest pain, no SOB, no abdominal pain, no swelling, some continued intentional weight loss    Patient Active Problem List   Diagnosis   • Migraines   • PCOS (polycystic ovarian syndrome)   • Seasonal allergies    • Myalgia and myositis, unspecified   • Obesity (BMI 30-39.9)   • Chronic low back pain without sciatica   • Vitamin D deficiency   • Iron deficiency     MEDICATIONS and HISTORY reviewed and updated in the electronic health record.  ALLERGIES:   Allergen Reactions   • Codeine PRURITUS     Itchy          OBJECTIVE:  Visit Vitals  BP (!) 126/92   Pulse 100   Wt 91.6 kg Comment: patient reported   LMP 01/23/2018 (Exact Date)   BMI 32.12 kg/m²     General - alert, in no distress, cooperative  Eyes - PERRL, conjunctiva normal  Mouth - Moist mucous membranes   Neck - supple, no lymphadenopathy and no thyromegaly  Lungs - normal and clear to auscultation, easy respirations  Heart - S1 and S2, Regular rate and rhythm and No murmur, rub, gallop  Abdomen - Soft, flat, non-tender, No masses, organomegaly, Bowel sounds normoactive, No rebound and No guarding  Extremities - No cyanosis, clubbing and No edema  Skin - Skin color, texture, turgor are normal. There are no bruises, rashes or lesions that appear significant that are visible.      EPIC chart reviewed today.      <75% EER x > 1 month, 14% weight loss x 2 months, moderate-severe muscle/fat depletion pressure injury

## 2022-05-12 NOTE — PROGRESS NOTE ADULT - PROBLEM SELECTOR PLAN 2
Seen by S&S prior and deferred MBSS because patient pending placement. Had EGD/FEES. Concern for recurrent aspiration.    -Pureed diet with thin liquids for now  -S&S eval for re-evaluation
Seen by S&S prior and deferred MBSS because patient pending placement. Had EGD/FEES. Concern for recurrent aspiration.    -Pureed diet with thin liquids for now  -pending repeat FEES

## 2022-05-12 NOTE — SWALLOW VFSS/MBS ASSESSMENT ADULT - SLP GENERAL OBSERVATIONS
Pt encountered in radiology, secure in EVAN chair. Awake, alert, on room air. Fully cooperative with assessment.

## 2022-05-12 NOTE — DIETITIAN INITIAL EVALUATION ADULT - NSFNSADHERENCEPTAFT_GEN_A_CORE
Therapeutic restrictions/modifications: carbohydrate counting. Of note Pt endorses eating preferred foods PRN, reports intake of concentrated sweets (i.e. hot fudge sundaes).

## 2022-05-12 NOTE — PHYSICAL THERAPY INITIAL EVALUATION ADULT - DISCHARGE DISPOSITION, PT EVAL
Quality 47: Advance Care Plan: Advance Care Planning discussed and documented; advance care plan or surrogate decision maker documented in the medical record. Quality 226: Preventive Care And Screening: Tobacco Use: Screening And Cessation Intervention: Patient screened for tobacco use and is an ex/non-smoker Detail Level: Detailed Quality 431: Preventive Care And Screening: Unhealthy Alcohol Use - Screening: Patient screened for unhealthy alcohol use using a single question and scores less than 2 times per year Quality 130: Documentation Of Current Medications In The Medical Record: Current Medications Documented subacute rehab/rehabilitation facility

## 2022-05-12 NOTE — ADVANCED PRACTICE NURSE CONSULT - REASON FOR CONSULT
Requested by staff to assess skin status of pt a/w a pressure injury. PMH is noted:  71F with PMHx of severe persistent asthma (steroid dependent with adrenal insufficiency secondary to chronic use), T2DM, HTN, colorectal cancer (with colostomy), tracheobronchomalacia (post-tracheobronchoplasty), bronchiectasis, paroxysmal atrial fibrillation and PVD sent in by [facility] physician to rule out infection of chronic right medial malleolus wound. Patient states wound on right foot near medial malleolus started in January 2022 around the time she was admitted to [facility] for pneumonia and was intubated. Since then has never truly healed. Recently admitted to [facility] twice in past two months. First admission for pneumonia and bronchiectasis exacerbation. Second admission for stuck food bolus. On that admission patient had EGD & seen by S&S. Patient states she started noticing increasing pain of right foot wound several days prior. Notice more discharge. She thinks they were wrapping the wound too tightly at rehab and skin was being sloughed off. She denies systemic symptoms such as fever or chills. Has been tolerating a pureed diet with no gross aspiration, but states he appetite is limited because the pureed diet at [facility] is horrible. Patient seen by podiatry for possible infection. Foot XR showed no tracking soft tissue gas collections or gross radiographic evidence of osteomyelitis.

## 2022-05-12 NOTE — ADVANCED PRACTICE NURSE CONSULT - ASSESSMENT
The pt was encountered on [facility]- Mrs Stanton is on Contact Isolation for MRSA. She was able to turn herself independently in the bed. She has declined to wear the boots for off-loading and staff have elevated her heels on a pillow.  As per review of the medical record, the pt was seen by Podiatry for a right heel wound.  Upon assessment, the pt presents with a sacral wound that measures 2cm x 1cmx 0.3cm- the wound bed presents with yellow slough, the wound edges are macerated the periwound skin is hyperpigmented. will recommend Medihoney paste to the wound to promote the autolytic debridement of the slough-I explained this to the pt. I also provided education about healing and how to prevent further pressure injury.  Pt reporting comfort with and was requesting the foam dressing- will recommend as a secondary dressing.  As the pt was a/w a pressure injury, a nutrition consult is pending for further evaluation

## 2022-05-12 NOTE — PROGRESS NOTE ADULT - PROBLEM SELECTOR PLAN 6
-Follows with Eulalio Allison  -Continue with Spiriva  -DuoNebs q6 hours  -Continue with chronic steroids    Dispo: [facility] pending FEES
-Follows with Eulalio Allison  -Continue with Spiriva  -DuoNebs q6 hours  -Continue with chronic steroids

## 2022-05-13 ENCOUNTER — TRANSCRIPTION ENCOUNTER (OUTPATIENT)
Age: 72
End: 2022-05-13

## 2022-05-13 VITALS
SYSTOLIC BLOOD PRESSURE: 140 MMHG | DIASTOLIC BLOOD PRESSURE: 57 MMHG | TEMPERATURE: 99 F | OXYGEN SATURATION: 96 % | RESPIRATION RATE: 16 BRPM | HEART RATE: 94 BPM

## 2022-05-13 LAB
GLUCOSE BLDC GLUCOMTR-MCNC: 140 MG/DL — HIGH (ref 70–99)
GLUCOSE BLDC GLUCOMTR-MCNC: 272 MG/DL — HIGH (ref 70–99)

## 2022-05-13 PROCEDURE — 74230 X-RAY XM SWLNG FUNCJ C+: CPT

## 2022-05-13 PROCEDURE — 92610 EVALUATE SWALLOWING FUNCTION: CPT

## 2022-05-13 PROCEDURE — 71045 X-RAY EXAM CHEST 1 VIEW: CPT

## 2022-05-13 PROCEDURE — 99285 EMERGENCY DEPT VISIT HI MDM: CPT

## 2022-05-13 PROCEDURE — 85610 PROTHROMBIN TIME: CPT

## 2022-05-13 PROCEDURE — 84132 ASSAY OF SERUM POTASSIUM: CPT

## 2022-05-13 PROCEDURE — 82435 ASSAY OF BLOOD CHLORIDE: CPT

## 2022-05-13 PROCEDURE — 87640 STAPH A DNA AMP PROBE: CPT

## 2022-05-13 PROCEDURE — 93923 UPR/LXTR ART STDY 3+ LVLS: CPT | Mod: 26

## 2022-05-13 PROCEDURE — U0003: CPT

## 2022-05-13 PROCEDURE — 87641 MR-STAPH DNA AMP PROBE: CPT

## 2022-05-13 PROCEDURE — 82947 ASSAY GLUCOSE BLOOD QUANT: CPT

## 2022-05-13 PROCEDURE — 99232 SBSQ HOSP IP/OBS MODERATE 35: CPT

## 2022-05-13 PROCEDURE — 85025 COMPLETE CBC W/AUTO DIFF WBC: CPT

## 2022-05-13 PROCEDURE — 82330 ASSAY OF CALCIUM: CPT

## 2022-05-13 PROCEDURE — 82962 GLUCOSE BLOOD TEST: CPT

## 2022-05-13 PROCEDURE — 82803 BLOOD GASES ANY COMBINATION: CPT

## 2022-05-13 PROCEDURE — 80053 COMPREHEN METABOLIC PANEL: CPT

## 2022-05-13 PROCEDURE — 94640 AIRWAY INHALATION TREATMENT: CPT

## 2022-05-13 PROCEDURE — 84295 ASSAY OF SERUM SODIUM: CPT

## 2022-05-13 PROCEDURE — 99239 HOSP IP/OBS DSCHRG MGMT >30: CPT

## 2022-05-13 PROCEDURE — 85014 HEMATOCRIT: CPT

## 2022-05-13 PROCEDURE — 85027 COMPLETE CBC AUTOMATED: CPT

## 2022-05-13 PROCEDURE — 85730 THROMBOPLASTIN TIME PARTIAL: CPT

## 2022-05-13 PROCEDURE — 92611 MOTION FLUOROSCOPY/SWALLOW: CPT

## 2022-05-13 PROCEDURE — A9585: CPT

## 2022-05-13 PROCEDURE — 85652 RBC SED RATE AUTOMATED: CPT

## 2022-05-13 PROCEDURE — 83605 ASSAY OF LACTIC ACID: CPT

## 2022-05-13 PROCEDURE — 87040 BLOOD CULTURE FOR BACTERIA: CPT

## 2022-05-13 PROCEDURE — 86140 C-REACTIVE PROTEIN: CPT

## 2022-05-13 PROCEDURE — 93923 UPR/LXTR ART STDY 3+ LVLS: CPT

## 2022-05-13 PROCEDURE — 96374 THER/PROPH/DIAG INJ IV PUSH: CPT

## 2022-05-13 PROCEDURE — 80048 BASIC METABOLIC PNL TOTAL CA: CPT

## 2022-05-13 PROCEDURE — 73723 MRI JOINT LWR EXTR W/O&W/DYE: CPT

## 2022-05-13 PROCEDURE — 85018 HEMOGLOBIN: CPT

## 2022-05-13 PROCEDURE — 73630 X-RAY EXAM OF FOOT: CPT

## 2022-05-13 PROCEDURE — 97161 PT EVAL LOW COMPLEX 20 MIN: CPT

## 2022-05-13 PROCEDURE — 83036 HEMOGLOBIN GLYCOSYLATED A1C: CPT

## 2022-05-13 PROCEDURE — U0005: CPT

## 2022-05-13 PROCEDURE — 36415 COLL VENOUS BLD VENIPUNCTURE: CPT

## 2022-05-13 RX ORDER — HYDROXYZINE HCL 10 MG
25 TABLET ORAL
Refills: 0 | Status: DISCONTINUED | OUTPATIENT
Start: 2022-05-13 | End: 2022-05-13

## 2022-05-13 RX ORDER — BUDESONIDE AND FORMOTEROL FUMARATE DIHYDRATE 160; 4.5 UG/1; UG/1
2 AEROSOL RESPIRATORY (INHALATION)
Qty: 0 | Refills: 0 | DISCHARGE
Start: 2022-05-13

## 2022-05-13 RX ORDER — HYDROXYZINE HCL 10 MG
1 TABLET ORAL
Qty: 0 | Refills: 0 | DISCHARGE
Start: 2022-05-13

## 2022-05-13 RX ORDER — ACETAMINOPHEN 500 MG
2 TABLET ORAL
Qty: 0 | Refills: 0 | DISCHARGE
Start: 2022-05-13

## 2022-05-13 RX ORDER — PETROLATUM,WHITE
1 JELLY (GRAM) TOPICAL
Refills: 0 | Status: DISCONTINUED | OUTPATIENT
Start: 2022-05-13 | End: 2022-05-13

## 2022-05-13 RX ORDER — PETROLATUM,WHITE
1 JELLY (GRAM) TOPICAL
Qty: 0 | Refills: 0 | DISCHARGE
Start: 2022-05-13

## 2022-05-13 RX ADMIN — Medication 3 MILLILITER(S): at 08:19

## 2022-05-13 RX ADMIN — Medication 300 UNIT(S): at 14:43

## 2022-05-13 RX ADMIN — MEXILETINE HYDROCHLORIDE 200 MILLIGRAM(S): 150 CAPSULE ORAL at 14:43

## 2022-05-13 RX ADMIN — CHLORHEXIDINE GLUCONATE 1 APPLICATION(S): 213 SOLUTION TOPICAL at 12:30

## 2022-05-13 RX ADMIN — Medication 8 MILLIGRAM(S): at 08:21

## 2022-05-13 RX ADMIN — POLYETHYLENE GLYCOL 3350 17 GRAM(S): 17 POWDER, FOR SOLUTION ORAL at 08:25

## 2022-05-13 RX ADMIN — Medication 4 UNIT(S): at 08:20

## 2022-05-13 RX ADMIN — Medication 3: at 08:20

## 2022-05-13 RX ADMIN — Medication 500 MILLIGRAM(S): at 12:22

## 2022-05-13 RX ADMIN — LACTULOSE 15 GRAM(S): 10 SOLUTION ORAL at 12:22

## 2022-05-13 RX ADMIN — Medication 150 MILLIGRAM(S): at 08:22

## 2022-05-13 RX ADMIN — TIOTROPIUM BROMIDE 1 CAPSULE(S): 18 CAPSULE ORAL; RESPIRATORY (INHALATION) at 12:21

## 2022-05-13 RX ADMIN — BUDESONIDE AND FORMOTEROL FUMARATE DIHYDRATE 2 PUFF(S): 160; 4.5 AEROSOL RESPIRATORY (INHALATION) at 08:19

## 2022-05-13 RX ADMIN — MEXILETINE HYDROCHLORIDE 200 MILLIGRAM(S): 150 CAPSULE ORAL at 08:21

## 2022-05-13 RX ADMIN — ZINC SULFATE TAB 220 MG (50 MG ZINC EQUIVALENT) 220 MILLIGRAM(S): 220 (50 ZN) TAB at 12:22

## 2022-05-13 RX ADMIN — PANTOPRAZOLE SODIUM 40 MILLIGRAM(S): 20 TABLET, DELAYED RELEASE ORAL at 08:21

## 2022-05-13 RX ADMIN — Medication 4 UNIT(S): at 12:19

## 2022-05-13 RX ADMIN — Medication 25 MILLIGRAM(S): at 12:21

## 2022-05-13 RX ADMIN — APIXABAN 5 MILLIGRAM(S): 2.5 TABLET, FILM COATED ORAL at 08:22

## 2022-05-13 RX ADMIN — Medication 1 TABLET(S): at 12:22

## 2022-05-13 NOTE — PROGRESS NOTE ADULT - ASSESSMENT
71F with PMHx of severe persistent asthma (steroid dependent with adrenal insufficiency secondary to chronic use), T2DM, HTN, colorectal cancer (with colostomy), tracheobronchomalacia (post-tracheobronchoplasty), bronchiectasis, paroxysmal atrial fibrillation and PVD sent in by [facility] physician to rule out infection of chronic right medial malleolus wound. 
70yo F w/ right medial navicular wound  - pt seen and evaluated  - afebrile, no leukocytosis, ESR 32, CRP 0.4   - right foot medial navicular wound w/ overlying well adhered eschar, no periwound erythema, no malodor, no bogginess, no drainage, no acute signs of infection   - Right foot xray showing no OM, no gas  - ordered R foot MR r/o OM   - no wound culture taken 2/2 no signs of infection  - pod plan likely local wound care pending MR results  - seen w/ attending 
71F extensive medical history including rectal adenocarcinoma s/p colectomy/colostomy, TBM s/p tracheobplasty, severe persistent asthma (Dupixent and steroids), bronchiectasis and chronic bronchitis, and dysphagia who presents with the worsening RLE wound.    1. Right Lower Extremity Wound  - Podiatry followup  - Wound care followup    2. Severe persistent asthma - steroid dependent (also with adrenal insufficiency)     - no acute respiratory distress  - maintain O2 sat > 88%  - incentive spirometer and acapella to facilitate breathing. If sputum develops may benefit from chest vest  - Continue Medrol at home dose-8mg  - Continue Dupixent as an outpatient  - Patient has been treated with multiple bronchodilators in the past: can use Symbicort, Spiriva, Accolate, and Duonebs while hospitalized - has been on dual anticholinergics    3. Tracheobronchomalacia s/p tracheoplasty in the past with Dr. Zapien at [facility]    4. Chronic bronchitis and chronic cough  - continue bronchodilator therapy  - would start airway clearance  - also a component of sensory neuropathic cough - on Amitryptiline    5. Infectious Disease   - recent hospitalizations at [facility] and then [facility] in March 2022 with bacterial pneumonia treated with antibiotics  - currently appears nontoxic appearing   - currently on isolation for prior MRSA pneumonia/infection  - RLE wound    6. GERD and dysphagia  - PPI  - Swallow evaluation/followup  - Aspiration precautions    7. Rhinitis  - Can start Flonase    8. Cardiovascular - history of Afib on AC  - Continue Mexilitene for prior PVCs    9. Prophylaxis  - DVT proph - AC     - GI proph  - Aspiration precautions   - Maintain O2 sat > 88%  - Incentive spirometer and acapella  *******************  5/13-DC pending-resp stable
71F with PMHx of severe persistent asthma (steroid dependent with adrenal insufficiency secondary to chronic use), T2DM, HTN, colorectal cancer (with colostomy), tracheobronchomalacia (post-tracheobronchoplasty), bronchiectasis, paroxysmal atrial fibrillation and PVD sent in by [facility] physician to rule out infection of chronic right medial malleolus wound.

## 2022-05-13 NOTE — DISCHARGE NOTE NURSING/CASE MANAGEMENT/SOCIAL WORK - NSDCFUADDAPPT_GEN_ALL_CORE_FT
Podiatry Discharge Instructions:  Follow up: Please follow up with Dr. Valenzuela within 1 week of discharge from the hospital, please call [phone] for appointment and discuss that you recently were seen in the hospital.  Wound Care: Please apply 4x4 gauze and yesenia to right foot wound daily.   Weight bearing: Please wear z-flow offloading boots on both feet at all times while in bed.

## 2022-05-13 NOTE — PROGRESS NOTE ADULT - SUBJECTIVE AND OBJECTIVE BOX
Podiatry pager #: [phone] ([place])/ [phone] ([facility])    Patient is a 71y old  Female who presents with a chief complaint of Right Medial Malleolus Wound (10 May 2022 18:26)       INTERVAL HPI/OVERNIGHT EVENTS:  Patient seen and evaluated at bedside.  Pt is resting comfortable in NAD. Denies N/V/F/C.     Allergies    aspirin (Short breath)  Avelox (Short breath; Pruritus)  codeine (Short breath)  Dilaudid (Short breath)  iodine (Short breath; Swelling)  penicillin (Unknown)  shellfish (Anaphylaxis)  tetanus toxoid (Short breath)  Valium (Short breath)    Intolerances        Vital Signs Last 24 Hrs  T(C): 36.9 (11 May 2022 11:29), Max: 37 (10 May 2022 21:05)  T(F): 98.5 (11 May 2022 11:29), Max: 98.6 (10 May 2022 21:05)  HR: 88 (11 May 2022 11:29) (71 - 88)  BP: 137/47 (11 May 2022 11:29) (128/57 - 151/70)  BP(mean): --  RR: 17 (11 May 2022 11:29) (16 - 18)  SpO2: 97% (11 May 2022 11:29) (96% - 100%)    LABS:                        10.4   6.87  )-----------( 310      ( 11 May 2022 06:10 )             34.9     05-11    142  |  104  |  7   ----------------------------<  129<H>  3.3<L>   |  26  |  0.39<L>    Ca    8.5      11 May 2022 06:09    TPro  5.6<L>  /  Alb  2.9<L>  /  TBili  0.2  /  DBili  x   /  AST  15  /  ALT  13  /  AlkPhos  62  05-10    PT/INR - ( 10 May 2022 12:26 )   PT: 13.7 sec;   INR: 1.19 ratio         PTT - ( 10 May 2022 12:26 )  PTT:31.3 sec    CAPILLARY BLOOD GLUCOSE      POCT Blood Glucose.: 164 mg/dL (11 May 2022 10:10)  POCT Blood Glucose.: 117 mg/dL (11 May 2022 08:03)  POCT Blood Glucose.: 181 mg/dL (10 May 2022 22:35)      Lower Extremity Physical Exam:  Vascular: DP/PT 1/4 B/L, CFT <3 seconds B/L, Temperature gradient warm to cool B/L  Neuro: Epicritic sensation intact to the level of midfoot B/L  Musculoskeletal/Ortho: unremarkable   Skin: right foot medial navicular wound w/ overlying well adhered eschar, no periwound erythema, no malodor, no bogginess, no drainage, no acute signs of infection     RADIOLOGY & ADDITIONAL TESTS:  
CHIEF COMPLAINT: f/up sob, chronic resp failure, TBM, severe asthma, allergic rhinitis-breathing well-no real issues    Interval Events: podiatry evaln    REVIEW OF SYSTEMS:  Constitutional: No fevers or chills. No weight loss. + fatigue or generalized malaise.  Eyes: No itching or discharge from the eyes  ENT: No ear pain. No ear discharge. No nasal congestion. No post nasal drip. No epistaxis. No throat pain. No sore throat. No difficulty swallowing.   CV: No chest pain. No palpitations. No lightheadedness or dizziness.   Resp: No dyspnea at rest. No dyspnea on exertion. No orthopnea. No wheezing. No cough. No stridor. No sputum production. No chest pain with respiration.  GI: No nausea. No vomiting. No diarrhea.  MSK: No joint pain or pain in any extremities  Integumentary: No skin lesions. No pedal edema.  Neurological: + gross motor weakness. No sensory changes.  [+ ] All other systems negative  [ ] Unable to assess ROS because ________    OBJECTIVE:  ICU Vital Signs Last 24 Hrs  T(C): 36.8 (13 May 2022 00:05), Max: 37 (12 May 2022 17:23)  T(F): 98.3 (13 May 2022 00:05), Max: 98.6 (12 May 2022 17:23)  HR: 75 (13 May 2022 00:05) (75 - 86)  BP: 122/50 (13 May 2022 00:05) (116/55 - 138/61)  BP(mean): 87 (12 May 2022 17:23) (87 - 87)  ABP: --  ABP(mean): --  RR: 18 (13 May 2022 00:05) (18 - 18)  SpO2: 98% (13 May 2022 00:05) (96% - 99%)        05-12 @ 07:01  -  05-13 @ 05:14  --------------------------------------------------------  IN: 0 mL / OUT: 300 mL / NET: -300 mL      CAPILLARY BLOOD GLUCOSE      POCT Blood Glucose.: 159 mg/dL (12 May 2022 21:35)      PHYSICAL EXAM: NAD in bed on RA  General: Awake, alert, oriented X 3.   HEENT: Atraumatic, normocephalic.                 Mallampatti Grade 2                No nasal congestion.                No tonsillar or pharyngeal exudates.  Lymph Nodes: No palpable lymphadenopathy  Neck: No JVD. No carotid bruit.   Respiratory: Normal chest expansion                         Normal percussion                         Normal and equal air entry                         rare exp wheeze, no rhonchi or rales.  Cardiovascular: S1 S2 normal. No murmurs, rubs or gallops.   Abdomen: Soft, non-tender, non-distended. No organomegaly. Normoactive bowel sounds.  Extremities: Warm to touch. Peripheral pulse palpable. No pedal edema.   Skin: No rashes or skin lesions  Neurological: Motor and sensory examination equal and normal in all four extremities.  Psychiatry: Appropriate mood and affect.    HOSPITAL MEDICATIONS:  MEDICATIONS  (STANDING):  albuterol/ipratropium for Nebulization 3 milliLiter(s) Nebulizer every 6 hours  apixaban 5 milliGRAM(s) Oral two times a day  ascorbic acid 500 milliGRAM(s) Oral daily  atorvastatin 20 milliGRAM(s) Oral at bedtime  budesonide 160 MICROgram(s)/formoterol 4.5 MICROgram(s) Inhaler 2 Puff(s) Inhalation two times a day  chlorhexidine 2% Cloths 1 Application(s) Topical daily  dextrose 5%. 1000 milliLiter(s) (100 mL/Hr) IV Continuous <Continuous>  dextrose 5%. 1000 milliLiter(s) (50 mL/Hr) IV Continuous <Continuous>  dextrose 50% Injectable 25 Gram(s) IV Push once  dextrose 50% Injectable 12.5 Gram(s) IV Push once  dextrose 50% Injectable 25 Gram(s) IV Push once  fluticasone propionate 50 MICROgram(s)/spray Nasal Spray 1 Spray(s) Both Nostrils two times a day  glucagon  Injectable 1 milliGRAM(s) IntraMuscular once  insulin glargine Injectable (LANTUS) 10 Unit(s) SubCutaneous at bedtime  insulin lispro (ADMELOG) corrective regimen sliding scale   SubCutaneous three times a day before meals  insulin lispro (ADMELOG) corrective regimen sliding scale   SubCutaneous at bedtime  insulin lispro Injectable (ADMELOG) 4 Unit(s) SubCutaneous three times a day before meals  lactulose Syrup 15 Gram(s) Oral daily  methylPREDNISolone 8 milliGRAM(s) Oral daily  mexiletine 200 milliGRAM(s) Oral three times a day  multivitamin 1 Tablet(s) Oral daily  pantoprazole    Tablet 40 milliGRAM(s) Oral before breakfast  polyethylene glycol 3350 17 Gram(s) Oral two times a day  pregabalin 150 milliGRAM(s) Oral two times a day  senna 2 Tablet(s) Oral at bedtime  tiotropium 18 MICROgram(s) Capsule 1 Capsule(s) Inhalation daily  zinc sulfate 220 milliGRAM(s) Oral daily    MEDICATIONS  (PRN):  acetaminophen     Tablet .. 650 milliGRAM(s) Oral every 6 hours PRN Temp greater or equal to 38C (100.4F), Mild Pain (1 - 3), Moderate Pain (4 - 6)  dextrose Oral Gel 15 Gram(s) Oral once PRN Blood Glucose LESS THAN 70 milliGRAM(s)/deciliter  oxyCODONE    IR 2.5 milliGRAM(s) Oral every 6 hours PRN Severe Pain (7 - 10)      LABS:                        10.4   6.87  )-----------( 310      ( 11 May 2022 06:10 )             34.9     05-11    142  |  104  |  7   ----------------------------<  129<H>  3.3<L>   |  26  |  0.39<L>    Ca    8.5      11 May 2022 06:09                MICROBIOLOGY:     RADIOLOGY:  [ ] Reviewed and interpreted by me    Point of Care Ultrasound Findings:    PFT:    EKG:
[facility] Division of Hospital Medicine  Luis Chung  Pager (M-F, 8A-5P): [phone]  Other Times:  [phone]    CC: 70 y/o F with Right medial malleolus wound    SUBJECTIVE / OVERNIGHT EVENTS:  No acute events overnight  denies f/chills/drainage this am  Denies cough/sob  MRI foot negative for osteo    ADDITIONAL REVIEW OF SYSTEMS:    MEDICATIONS  (STANDING):  albuterol/ipratropium for Nebulization 3 milliLiter(s) Nebulizer every 6 hours  apixaban 5 milliGRAM(s) Oral two times a day  ascorbic acid 500 milliGRAM(s) Oral daily  atorvastatin 20 milliGRAM(s) Oral at bedtime  chlorhexidine 2% Cloths 1 Application(s) Topical daily  dextrose 5%. 1000 milliLiter(s) (100 mL/Hr) IV Continuous <Continuous>  dextrose 5%. 1000 milliLiter(s) (50 mL/Hr) IV Continuous <Continuous>  dextrose 50% Injectable 25 Gram(s) IV Push once  dextrose 50% Injectable 12.5 Gram(s) IV Push once  dextrose 50% Injectable 25 Gram(s) IV Push once  fluticasone propionate 50 MICROgram(s)/spray Nasal Spray 1 Spray(s) Both Nostrils two times a day  glucagon  Injectable 1 milliGRAM(s) IntraMuscular once  insulin glargine Injectable (LANTUS) 10 Unit(s) SubCutaneous at bedtime  insulin lispro (ADMELOG) corrective regimen sliding scale   SubCutaneous three times a day before meals  insulin lispro (ADMELOG) corrective regimen sliding scale   SubCutaneous at bedtime  insulin lispro Injectable (ADMELOG) 4 Unit(s) SubCutaneous three times a day before meals  lactulose Syrup 15 Gram(s) Oral daily  methylPREDNISolone 8 milliGRAM(s) Oral daily  mexiletine 200 milliGRAM(s) Oral three times a day  multivitamin 1 Tablet(s) Oral daily  pantoprazole    Tablet 40 milliGRAM(s) Oral before breakfast  polyethylene glycol 3350 17 Gram(s) Oral two times a day  pregabalin 150 milliGRAM(s) Oral two times a day  senna 2 Tablet(s) Oral at bedtime  tiotropium 18 MICROgram(s) Capsule 1 Capsule(s) Inhalation daily  zinc sulfate 220 milliGRAM(s) Oral daily    MEDICATIONS  (PRN):  acetaminophen     Tablet .. 650 milliGRAM(s) Oral every 6 hours PRN Temp greater or equal to 38C (100.4F), Mild Pain (1 - 3), Moderate Pain (4 - 6)  dextrose Oral Gel 15 Gram(s) Oral once PRN Blood Glucose LESS THAN 70 milliGRAM(s)/deciliter  oxyCODONE    IR 2.5 milliGRAM(s) Oral every 6 hours PRN Severe Pain (7 - 10)      I&O's Summary      PHYSICAL EXAM:  Vital Signs Last 24 Hrs  T(C): 37.1 (12 May 2022 00:40), Max: 37.1 (11 May 2022 23:00)  T(F): 98.8 (12 May 2022 00:40), Max: 98.8 (12 May 2022 00:40)  HR: 85 (12 May 2022 00:40) (76 - 89)  BP: 114/50 (12 May 2022 00:40) (111/68 - 140/78)  BP(mean): --  RR: 18 (12 May 2022 00:40) (18 - 18)  SpO2: 98% (12 May 2022 00:40) (96% - 98%)    CONSTITUTIONAL: NAD, well-developed, thin  EYES: PERRLA; conjunctiva and sclera clear  ENMT: Moist oral mucosa, no pharyngeal injection or exudates  NECK: Supple, no palpable masses  RESPIRATORY: Normal respiratory effort; lungs are clear to auscultation bilaterally  CARDIOVASCULAR: Regular rate and rhythm, normal S1 and S2, no murmur/rub/gallop; No lower extremity edema; Peripheral pulses are 2+ bilaterally  ABDOMEN: Nontender to palpation, normoactive bowel sounds  MUSCULOSKELETAL:  no clubbing or cyanosis of digits; no joint swelling or tenderness to palpation. Gauze and kerlex over R foot/heel, no drainage nttp  PSYCH: A+O to person, place, and time; affect appropriate  NEUROLOGY: CN 2-12 are intact and symmetric; no gross sensory deficits   SKIN: No rashes; no palpable lesions    LABS:                        10.4   6.87  )-----------( 310      ( 11 May 2022 06:10 )             34.9     05-11    142  |  104  |  7   ----------------------------<  129<H>  3.3<L>   |  26  |  0.39<L>    Ca    8.5      11 May 2022 06:09                Culture - Blood (collected 10 May 2022 12:20)  Source: .Blood Blood-Peripheral  Preliminary Report (11 May 2022 15:06):    No growth to date.    Culture - Blood (collected 10 May 2022 12:15)  Source: .Blood Blood-Peripheral  Preliminary Report (11 May 2022 15:06):    No growth to date.    MRI R foot  Shallow wound along the dorsal medial aspect of the ankle at the level of the navicular with surrounding cellulitis. No drainable fluid collection. No evidence of osteomyelitis.    FEES: pending
[facility] Division of Hospital Medicine  Kacie Meek MD  Pager (M-F, 8A-5P): [phone], MS Teams PREFERRED  Other Times:  [phone]      SUBJECTIVE / OVERNIGHT EVENTS: Pt seen and examined at bedside. NAD.    MEDICATIONS  (STANDING):  albuterol/ipratropium for Nebulization 3 milliLiter(s) Nebulizer every 6 hours  apixaban 5 milliGRAM(s) Oral two times a day  ascorbic acid 500 milliGRAM(s) Oral daily  atorvastatin 20 milliGRAM(s) Oral at bedtime  chlorhexidine 2% Cloths 1 Application(s) Topical daily  dextrose 5%. 1000 milliLiter(s) (100 mL/Hr) IV Continuous <Continuous>  dextrose 5%. 1000 milliLiter(s) (50 mL/Hr) IV Continuous <Continuous>  dextrose 50% Injectable 25 Gram(s) IV Push once  dextrose 50% Injectable 12.5 Gram(s) IV Push once  dextrose 50% Injectable 25 Gram(s) IV Push once  fluticasone propionate 50 MICROgram(s)/spray Nasal Spray 1 Spray(s) Both Nostrils two times a day  glucagon  Injectable 1 milliGRAM(s) IntraMuscular once  insulin glargine Injectable (LANTUS) 10 Unit(s) SubCutaneous at bedtime  insulin lispro (ADMELOG) corrective regimen sliding scale   SubCutaneous three times a day before meals  insulin lispro (ADMELOG) corrective regimen sliding scale   SubCutaneous at bedtime  insulin lispro Injectable (ADMELOG) 4 Unit(s) SubCutaneous three times a day before meals  lactulose Syrup 15 Gram(s) Oral daily  methylPREDNISolone 8 milliGRAM(s) Oral daily  mexiletine 200 milliGRAM(s) Oral three times a day  multivitamin 1 Tablet(s) Oral daily  pantoprazole    Tablet 40 milliGRAM(s) Oral before breakfast  polyethylene glycol 3350 17 Gram(s) Oral two times a day  pregabalin 150 milliGRAM(s) Oral two times a day  senna 2 Tablet(s) Oral at bedtime  tiotropium 18 MICROgram(s) Capsule 1 Capsule(s) Inhalation daily  zinc sulfate 220 milliGRAM(s) Oral daily    MEDICATIONS  (PRN):  acetaminophen     Tablet .. 650 milliGRAM(s) Oral every 6 hours PRN Temp greater or equal to 38C (100.4F), Mild Pain (1 - 3), Moderate Pain (4 - 6)  dextrose Oral Gel 15 Gram(s) Oral once PRN Blood Glucose LESS THAN 70 milliGRAM(s)/deciliter  oxyCODONE    IR 2.5 milliGRAM(s) Oral every 6 hours PRN Severe Pain (7 - 10)      I&O's Summary      PHYSICAL EXAM:  Vital Signs Last 24 Hrs  T(C): 36.9 (11 May 2022 11:29), Max: 37 (10 May 2022 21:05)  T(F): 98.5 (11 May 2022 11:29), Max: 98.6 (10 May 2022 21:05)  HR: 88 (11 May 2022 11:29) (71 - 88)  BP: 137/47 (11 May 2022 11:29) (128/57 - 151/70)  BP(mean): --  RR: 17 (11 May 2022 11:29) (16 - 18)  SpO2: 97% (11 May 2022 11:29) (96% - 100%)  CONSTITUTIONAL: NAD, well-developed, well-groomed, t hin lady lying in bed  EYES: PERRLA; conjunctiva and sclera clear  ENMT: Moist oral mucosa, no pharyngeal injection or exudates;   NECK: Supple, no palpable masses;   RESPIRATORY: Normal respiratory effort; lungs are clear to auscultation bilaterally  CARDIOVASCULAR: Regular rate and rhythm, normal S1 and S2, no murmur/rub/gallop; No lower extremity edema;  ABDOMEN: Nontender to palpation, normoactive bowel sounds, no rebound/guarding;   MUSCULOSKELETAL:   no clubbing or cyanosis of digits; no joint swelling or tenderness to palpation, dressing over right foot  PSYCH: A+O to person, place, and time; affect appropriate  NEUROLOGY: CN 2-12 are intact and symmetric; no gross sensory deficits   SKIN: No rashes; no palpable lesions    LABS:                        10.4   6.87  )-----------( 310      ( 11 May 2022 06:10 )             34.9     05-11    142  |  104  |  7   ----------------------------<  129<H>  3.3<L>   |  26  |  0.39<L>    Ca    8.5      11 May 2022 06:09    TPro  5.6<L>  /  Alb  2.9<L>  /  TBili  0.2  /  DBili  x   /  AST  15  /  ALT  13  /  AlkPhos  62  05-10    PT/INR - ( 10 May 2022 12:26 )   PT: 13.7 sec;   INR: 1.19 ratio         PTT - ( 10 May 2022 12:26 )  PTT:31.3 sec

## 2022-05-13 NOTE — DISCHARGE NOTE NURSING/CASE MANAGEMENT/SOCIAL WORK - NSDCVIVACCINE_GEN_ALL_CORE_FT
COVID-19, mRNA, LNP-S, PF, 100 mcg/ 0.5 mL dose (Moderna); 06-Dec-2021 17:12; Afshan Lew (RAD[employer]); [employer] [id number] (Exp. Date: 22-Dec-2021); IntraMuscular; Deltoid Left.; 0.25 milliLiter(s);

## 2022-05-13 NOTE — PROGRESS NOTE ADULT - REASON FOR ADMISSION
Right Medial Malleolus Wound

## 2022-05-13 NOTE — PROGRESS NOTE ADULT - TIME BILLING
as above: resp stable for DC to rehab  multifactorial dyspnea-TBM, CB, severe persistent asthma, prior PNA/bronchiectasis, debility, anemia, CAD--O2 NC sat above 90%  s/p ADMIT 3/2022 for PNA/bronchiectasis-f/up sputum cx-prior pseudomonas- (s/p ID formal evaln-RISHABH Liz/Junior)-s/p cefepime and        vanco for MRSA and pseudomonas as well as achromobacter and kleb- vancomycin to continued through 4/6--now stable off ABX    TBM-CB/brochiectasis-acapella/vest rx, incentive spirometry--utilize vest rx  asthma-medrol 8 mg (chronic dosing) , spiriva/symbicort, duoneb q 6, singulair 10 q hs, hypertonic saline  allergy-claritin/flonase                                 *****dysphonia/VC dysfunction-thrush-ENT evaln/swallow evaln -puree diet/asp precautions  ****gerd-sensation of globus (resolved) s/p EGD; esophagram;, aspiration precautions, protonix pre breakfast and pepcid 40 q hs; sx likely due to                positional eating in bed and dysmotility (confirmed)  abnormal CT-nodule-f/up 2 months                    cards-on mult rx-to continue  PT-OOB    DVT prophylaxis              Decub care                  ID-off ABX       Podiatry f/up                    DC planning-to rehab     (all inhaled meds and eating must be done in chair-efficacy and asp. risk)    Eulalio Allison MD-Pulmonary   [phone].

## 2022-05-13 NOTE — DISCHARGE NOTE NURSING/CASE MANAGEMENT/SOCIAL WORK - PATIENT PORTAL LINK FT
You can access the FollowMyHealth Patient Portal offered by [facility] by registering at the following website: [email or web address]. By joining [employer]’s FollowMyHealth portal, you will also be able to view your health information using other applications (apps) compatible with our system.

## 2022-05-13 NOTE — CHART NOTE - NSCHARTNOTEFT_GEN_A_CORE
Patient seen and examined, briefly:  71F with PMHx of severe persistent asthma (steroid dependent with adrenal insufficiency secondary to chronic use), T2DM, HTN, colorectal cancer (with colostomy), tracheobronchomalacia (post-tracheobronchoplasty), bronchiectasis, paroxysmal atrial fibrillation and PVD sent in by [facility] for concern for osteo and infection of chronic right medial malleolus wound. MRI performed without signs of osteo, Podiatry evaluated, planning local wound care. currently no F or leukocytosis to suggest infection. While admitted, has history of dysphagia, concern for aspiration risk. Underwent FEES without signs of aspiration. Deemed stable for D/C    EXAM  CONSTITUTIONAL: NAD, well-developed, thin  EYES: PERRLA; conjunctiva and sclera clear  ENMT: Moist oral mucosa, no pharyngeal injection or exudates  NECK: Supple, no palpable masses  RESPIRATORY: Normal respiratory effort; lungs are clear to auscultation bilaterally  CARDIOVASCULAR: Regular rate and rhythm, normal S1 and S2, no murmur/rub/gallop; No lower extremity edema; Peripheral pulses are 2+ bilaterally  ABDOMEN: Nontender to palpation, normoactive bowel sounds  MUSCULOSKELETAL:  no clubbing or cyanosis of digits; no joint swelling or tenderness to palpation. Gauze and kerlex over R foot/heel, no drainage nttp  PSYCH: A+O to person, place, and time; affect appropriate  NEUROLOGY: CN 2-12 are intact and symmetric; no gross sensory deficits   SKIN: No rashes; no palpable lesions    Plan  dry sterile dressing to R foot daily with gauze and yesenia, Z flow offloading boots while in bed b/l.  continue home mexiletine, apixaban, lactulose, methylpred 8, pregabalin 150mg BID  continue glargine 10 qhs and 4 unit aspart TID with meals  continue tiotropium  Continue remainder of home meds

## 2022-05-13 NOTE — DISCHARGE NOTE NURSING/CASE MANAGEMENT/SOCIAL WORK - NSDCPEFALRISK_GEN_ALL_CORE
For information on Fall & Injury Prevention, visit: [email or web address] OR  [email or web address] OR  https://www.cdc.gov/steadi/patient.html

## 2022-05-17 ENCOUNTER — NON-APPOINTMENT (OUTPATIENT)
Age: 72
End: 2022-05-17

## 2022-05-27 ENCOUNTER — APPOINTMENT (OUTPATIENT)
Dept: DERMATOLOGY | Facility: CLINIC | Age: 72
End: 2022-05-27

## 2022-05-31 ENCOUNTER — APPOINTMENT (OUTPATIENT)
Dept: PULMONOLOGY | Facility: CLINIC | Age: 72
End: 2022-05-31

## 2022-06-06 ENCOUNTER — NON-APPOINTMENT (OUTPATIENT)
Age: 72
End: 2022-06-06

## 2022-06-10 NOTE — ED ADULT NURSE NOTE - ED STAT RN HANDOFF DETAILS
Occupational Therapy  's Readiness Pre-Vehicle Evaluation    Visit Count: 1     Referred by: Matti Jamison MD  Medical Diagnosis (from order):   Diagnosis Information      Diagnosis    432.9, 286.9 (ICD-9-CM) - I61.9, D68.9 (ICD-10-CM) - Acute spont intraparenchymal hemorrhage assoc w/ coagulopathy (CMS/HCC)              Treatment Diagnosis: Patient was found to have a remote CVA with left sided weakness who was admitted to [facility] on 3/17/2022 with SDH.  Patient works at [employer] and was found down in an aisle.  Patient did not recall tripping or falling.  He was brought to [facility] ED where CT scan of head was notable for large area of right MCA territory encephalomalacia, with acute IPH in 3 separate locations, along with SDH with mild parenchymal mass effect with impaired strength, impaired activity tolerance, impaired balance, when I walk the dog I use a cane    Date of Onset of condition affecting driving: \"I drove here for the appointment.\"  Chart reviewed at time of evaluation:  Diagnosis/Pertinent Medical History/Co-morbidities/Precautions to driving:   Essential (primary) hypertension                              Urinary tract infection                                       History of gout                                               History of colon polyps                                       S/P AVR (aortic valve replacement)                              Comment: w/ CABG X3     Bilateral cataracts                             07/29/2021    Atrial fibrillation (CMS/HCC)                   2012            Comment: [place] heart [place], [place]    Cerebral infarction (CMS/HCC)                   1997            Comment: no residual, no cause found on eval    Emphysema lung (CMS/HCC)                                      COVID-19                                        12/2020       Mumps                                                         Chickenpox                                                     Migraine                                                      H/O bladder infections                                        Back pain                                                   Current Medications affecting driving: not that I am aware of      SUBJECTIVE   History of seizures: No  Hearing impairment: No  Last vision exam: 2 weeks ago. I see my eye doctor in [place]. I saw my surgeon as well in [place] since this all happened   Corrective lenses: Yes. Need for distance and reading.   Patient's perception of anticipated performance: since he has been driving and does not see an issue he feels he will do fine.   Reported concerns about driving from others (Physician, family member, police report): no concerns that he is aware of from family or the physician. Just formality.      Current Driving Status: Comments:   Valid 's license? Yes    State/expiration: [place]/ 2/12/2030   Restrictions: Corrective lenses    Recent accidents or violations? No    Driving experience since injury/medical issue About 2 weeks ago since the wife was tired of driving. He has driven to his therapy session    Last date driven: today   Types of driving needed: Local driving only at this time    Approximate miles: 10-15 miles. Would like to go fishing up north in the fall with his sons    How often: Daily since going for appointments with MD and therapy   Type of car: [employer] OGK-5   Alternate means of transportation At this time he does have his wife who had been providing transportation    Need to transport device? (Type of device) I will take the cane occasionally if walking in the store looking for something     Pain:   Plantar fasciaitis on the left foot    Function:  Limitations Prior to Injury or condition affecting driving:   Limitations and exacerbating factors (patient reported): no issues per patient report. he is not reporting any difficulities at this time other than endurance and balance   Prior level (patient  reported): independent with all activities of daily living and instrumental activities of daily living, driving  Personal Occupations Profile Affected: care of pets, driving, job performance  Patient Goals:  \"I would like to go back to [employer] July 1 if they will have me back.\"  Prior Treatment: outpatient physical therapy, outpatient occupational therapy, outpatient speech therapy, inpatient physical therapy, inpatient occupational therapy, inpatient speech therapy, home physical therapy, home occupational therapy and home speech therapy in the past year for current condition.Hospitalization, home health services or skilled nursing facility in the last 30 days: No, per patient.  Home Environment/Social Support: Patient lives with significant other.  Patient has intermittent assist from family/friends.    Safety:  Do you feel safe at home, work and/or school? yes, per patient  Patient denies 2 or more falls or an unexplained fall with injury in the last year, further testing not required     OBJECTIVE   Only those tests that are assessed are noted.  Tests used based on therapist observations, performance and/or diagnosis.     Results stated as: WFL=within functional limits, BFL=below functional limits  Cognitive Assessment Result Comments   SLUMS   Norms: High School education   27-30 normal; 21-26 MCI; 1 - 20 Dementia   < High School education  25-30 normal; 20-24 MCI; 1-19 Dementia NA    Short Blessed Test:   >7=BFL WFL Got all answers correct    ENEDELIA Road Law and Craft Test: (Score - 37 possible:  20 or below indicates may have difficulty with road craft skills during road test) NA    Driving Knowledge Questionnaire  20 - 17 or below is BFL (85%) WFL 19/20 correct    Maze test:   > 100 seconds is BFL  WFL 44 seconds    Trailmaking A:   > 90 sec norm is BFL WFL 41 seconds    Trailmaking B:   >180 sec norm  BFL WFL 1 error but completed in 1 minutes and 48 seconds    Traffic sign recognition:   less than 10/12  BFL WFL 12/12 correct      Physical Assessment:  [x] All physical abilities WFL for operation of vehicle  [] Only deficits noted    Upper Extremity Lower Extremity Comments    Range of motion left WFL WFL    Range of motion right WFL* WFL Rotator cuff injury which is not new   Strength left 4+/5 5/5    Strength right 4/5* 5/5 Refrained from addressing shoulder due to limitation   Coordination left WFL WFL    Coordination right WFL WFL    Sensation left WFL WFL    Sensation right WFL WFL      Mobility Assessment  [x] All physical abilities WFL for operation of vehicle  [] Only deficits noted    Comments / Observations   Car Transfer Getting up/down the chairs without issue   Cervical Rotation Able to move his neck in all directions to check his blind spot   Functional Mobility for Independent driving Got patient from lobby without device. No issues noted. States uses cane on longer distances for his balance     Vision Assessment  Results stated as: WFL=within functional limits, BFL=below functional limits   Result Comments   Pursuits WFL Moving both eyes together during pursuits and saccades. Able to complete 5 rounds CW and CCW.    Saccades WFL    Peripheral vision   < 70° better eye is BFL  WFL Able to see in all fields of view with both eyes. His right eye is intact for all degrees   Far acuity 20/30-1 Per patient leaky vessels in the right eye. Was taking eye drops but was not helping. Poor central vision but good peripheral    Depth perception   via road sign test <3/3 is BFL BFL Not able to identify the 3 signs   Near point convergence / Fusion WFL Object got blurry at about 3 inches from bridge of nose and clear at about 4 inches     BITS   Results stated as: WFL=within functional limits, BFL=below functional limits  (reaction time)  Score Norms Results   Mode A Self-paced BFL. Not used as a pass/fail for being able to drive 52+  35 hits with reaction time of 1.66 seconds                  Four Minute  Self-paced endurance  + 104 hits with 2.3 seconds to react wit 90% accuracy      Gas Brake Reaction Time: (seconds)  Premorbid brake pattern: right  foot  Norm = .75 seconds each trial  Hard Break Reaction Time   Trial 1 .76   Trial 2 .78   Trial 3 .73   Trial 4 .73   Trial 5 .71   Trial 6 .7   Trial 7 .62   Trial 8 .65   Trial 9 .65   Trial 10 .55   Percentage Passed: 80 %     Comments:    Norm = 1.5 seconds  Random Reaction Time  Stimulus Type   Trial 1 1.27 Left    Trial 2 1.23 right   Trial 3 .86 left   Trial 4 .93 Left    Trial 5 1.00 Left    Trial 6 1.01 Left    Trial 7 .94 Right    Trial 8 1.37 right   Trial 9 1.13 right   Trial 10 1.00 Left    Percentage Passed:  100 %  Comments:got the concept with braking and turning. Did well.     Initial Treatment   Initial evaluation completed.    Self Care / Home Management:   Per patient he has been driving since he wife was tired of it. He has driven the last couple of weeks and has not noticed any issues. Recommend he continue to ease into driving and stay locally. Will have sons with him when he drives up north for fishing in the fall. Explained to work with PT and OT on tasks he may need to do to return to [employer] which would require climbing a ladder, stocking shelves and putting up displays.       Skilled input: verbal instruction/cues, tactile instruction/cues    Home Program:  * above=instructed home program    Continues with OP OT, PT and SLP     Writer verbally educated the patient and received verbal consent from the patient on hand placement, positioning of patient, and techniques to be performed today including therapist position for techniques, hand placement and palpation for techniques as described above and how they are pertinent to the patient's plan of care.     ASSESSMENT     Related to driving: Patient was referred to the clinic portion of the ’s assessment by PMR secondary to his prolonged stay in the hospital and extensive  therapy sessions. Also would like to ensure since his diagnosis he is doing well with reaction time, processing, attention and vision. He was pleasant, cooperative and able to follow all directions with good understanding and insight into the purpose of testing. All areas of testing were completed with patient scoring within normal range in all tests. Education provided on gradual return to driving and reduction of distractors as needed. Reinforced that determination to drive is completed by his physician with results sent through his electronic medical record.   Discharge OT at this time as no further skilled needs were determined.     No further clinic or road tested recommended at this time.      The results of the evaluation are representative of the client's performance level at the time of this evaluation and may not be predictive of unanticipated medication-related issues, roadway conditions, or interactions with other roadways users. Therapist clinical judgement is based on objective results of the tests completed. Permission to drive is granted by the physician and the department of motor vehicles.     Patient will benefit from skilled therapy and Rehabilitative potential is good based on assessment above  Predicted patient presentation: clinical decision making of low complexity, no task modification     PLAN   Goals:  To be obtained by end of this plan of care:  1. Patient will verbalize Occupational Therapy recommendation and plan and how to access needed resources for driving MOD I or alternate means of transportation.  Status: MET    The following skilled interventions to be implemented to achieve above:  Activities of Daily Living/Self Care (71835)    Frequency/Duration: patient seen one time for 's readiness pre-vehicle evaluation, no further appointments indicated    The plan of care and goals were established with the patient who concurs.  Patient has been given attendance policy at time of  initial evaluation.    Patient Education:  Who will be receiving education: patient  Are they ready to learn: yes  Preferred learning style: written, verbal, demonstration  Barriers to learning: no barriers apparent at this time   Result of initial outlined education: Verbalizes understanding and Demonstrates understanding    Therapy procedure time and total treatment time can be found documented on the Time Entry flowsheet   report given to day RADHA Mesa

## 2022-06-30 ENCOUNTER — APPOINTMENT (OUTPATIENT)
Dept: WOUND CARE | Facility: CLINIC | Age: 72
End: 2022-06-30

## 2022-06-30 DIAGNOSIS — E11.40 TYPE 2 DIABETES MELLITUS WITH DIABETIC NEUROPATHY, UNSPECIFIED: ICD-10-CM

## 2022-06-30 DIAGNOSIS — L97.512 NON-PRESSURE CHRONIC ULCER OF OTHER PART OF RIGHT FOOT WITH FAT LAYER EXPOSED: ICD-10-CM

## 2022-06-30 PROCEDURE — 99214 OFFICE O/P EST MOD 30 MIN: CPT

## 2022-06-30 NOTE — HISTORY OF PRESENT ILLNESS
[FreeTextEntry1] : 71 F R medial foot wound. Pt was admitted to [facility] back in March for acute repirtatory distress. Pt developed pressure wound after being bed bound. Pt was recently discharged from rehab, where nurses have been taking care of it. Pt denies N/V/F/Chills.

## 2022-06-30 NOTE — ASSESSMENT
[FreeTextEntry1] : 71 F R medial foot wound to SubQ\par - pt seen and evaluated\par - medial arch foot wound to subQ, tracking proximally, mild timi wound erythema, no pus\par - R foot wound culture taken \par - RX Levofloxacin\par - RX Gabapentin 300 mg to be taken at bed time for pain control \par - RX topical oxygen \par - RX diabetic shoes, to be assessed by orthotist Jadiel Singh\par - Ordered R foot MRI to rule out deep abscess\par - pt taking tramadol for pain\par - rec pain management for aspirin control\par - VNS orders given to pt , dress wound with Mupirocin and DSD 3X a week \par - RTC 2 week\par

## 2022-07-01 ENCOUNTER — APPOINTMENT (OUTPATIENT)
Dept: INTERNAL MEDICINE | Facility: CLINIC | Age: 72
End: 2022-07-01

## 2022-07-01 VITALS
SYSTOLIC BLOOD PRESSURE: 140 MMHG | HEART RATE: 94 BPM | BODY MASS INDEX: 21.72 KG/M2 | WEIGHT: 140 LBS | DIASTOLIC BLOOD PRESSURE: 88 MMHG | HEIGHT: 67.5 IN | OXYGEN SATURATION: 92 %

## 2022-07-01 DIAGNOSIS — S91.301A UNSPECIFIED OPEN WOUND, RIGHT FOOT, INITIAL ENCOUNTER: ICD-10-CM

## 2022-07-01 PROCEDURE — 99212 OFFICE O/P EST SF 10 MIN: CPT

## 2022-07-02 ENCOUNTER — INPATIENT (INPATIENT)
Facility: HOSPITAL | Age: 72
LOS: 8 days | Discharge: NURSING, REHAB OR LONG-TERM CARE | DRG: 853 | End: 2022-07-11
Attending: INTERNAL MEDICINE | Admitting: SPECIALIST
Payer: MEDICARE

## 2022-07-02 VITALS
HEIGHT: 67 IN | OXYGEN SATURATION: 93 % | SYSTOLIC BLOOD PRESSURE: 105 MMHG | HEART RATE: 110 BPM | WEIGHT: 139.99 LBS | DIASTOLIC BLOOD PRESSURE: 67 MMHG | TEMPERATURE: 100 F | RESPIRATION RATE: 16 BRPM

## 2022-07-02 DIAGNOSIS — T78.2XXA ANAPHYLACTIC SHOCK, UNSPECIFIED, INITIAL ENCOUNTER: ICD-10-CM

## 2022-07-02 DIAGNOSIS — H05.352 EXOSTOSIS OF LEFT ORBIT: Chronic | ICD-10-CM

## 2022-07-02 DIAGNOSIS — K62.5 HEMORRHAGE OF ANUS AND RECTUM: Chronic | ICD-10-CM

## 2022-07-02 DIAGNOSIS — Z98.89 OTHER SPECIFIED POSTPROCEDURAL STATES: Chronic | ICD-10-CM

## 2022-07-02 DIAGNOSIS — Z98.890 OTHER SPECIFIED POSTPROCEDURAL STATES: Chronic | ICD-10-CM

## 2022-07-02 DIAGNOSIS — Z96.653 PRESENCE OF ARTIFICIAL KNEE JOINT, BILATERAL: Chronic | ICD-10-CM

## 2022-07-02 DIAGNOSIS — Z87.09 PERSONAL HISTORY OF OTHER DISEASES OF THE RESPIRATORY SYSTEM: Chronic | ICD-10-CM

## 2022-07-02 LAB
ALBUMIN SERPL ELPH-MCNC: 3.3 G/DL — SIGNIFICANT CHANGE UP (ref 3.3–5)
ALBUMIN SERPL ELPH-MCNC: 3.6 G/DL — SIGNIFICANT CHANGE UP (ref 3.3–5)
ALP SERPL-CCNC: 102 U/L — SIGNIFICANT CHANGE UP (ref 40–120)
ALP SERPL-CCNC: 92 U/L — SIGNIFICANT CHANGE UP (ref 40–120)
ALT FLD-CCNC: 13 U/L — SIGNIFICANT CHANGE UP (ref 10–45)
ALT FLD-CCNC: 21 U/L — SIGNIFICANT CHANGE UP (ref 10–45)
ANION GAP SERPL CALC-SCNC: 16 MMOL/L — SIGNIFICANT CHANGE UP (ref 5–17)
ANION GAP SERPL CALC-SCNC: 16 MMOL/L — SIGNIFICANT CHANGE UP (ref 5–17)
ANISOCYTOSIS BLD QL: SLIGHT — SIGNIFICANT CHANGE UP
APPEARANCE UR: CLEAR — SIGNIFICANT CHANGE UP
APTT BLD: 32.6 SEC — SIGNIFICANT CHANGE UP (ref 27.5–35.5)
APTT BLD: 32.8 SEC — SIGNIFICANT CHANGE UP (ref 27.5–35.5)
AST SERPL-CCNC: 17 U/L — SIGNIFICANT CHANGE UP (ref 10–40)
AST SERPL-CCNC: 34 U/L — SIGNIFICANT CHANGE UP (ref 10–40)
BACTERIA # UR AUTO: NEGATIVE — SIGNIFICANT CHANGE UP
BASE EXCESS BLDV CALC-SCNC: 4.1 MMOL/L — HIGH (ref -2–2)
BASOPHILS # BLD AUTO: 0 K/UL — SIGNIFICANT CHANGE UP (ref 0–0.2)
BASOPHILS # BLD AUTO: 0.06 K/UL — SIGNIFICANT CHANGE UP (ref 0–0.2)
BASOPHILS NFR BLD AUTO: 0 % — SIGNIFICANT CHANGE UP (ref 0–2)
BASOPHILS NFR BLD AUTO: 0.3 % — SIGNIFICANT CHANGE UP (ref 0–2)
BILIRUB SERPL-MCNC: 0.2 MG/DL — SIGNIFICANT CHANGE UP (ref 0.2–1.2)
BILIRUB SERPL-MCNC: 0.4 MG/DL — SIGNIFICANT CHANGE UP (ref 0.2–1.2)
BILIRUB UR-MCNC: ABNORMAL
BUN SERPL-MCNC: 12 MG/DL — SIGNIFICANT CHANGE UP (ref 7–23)
BUN SERPL-MCNC: 14 MG/DL — SIGNIFICANT CHANGE UP (ref 7–23)
CA-I SERPL-SCNC: 1.18 MMOL/L — SIGNIFICANT CHANGE UP (ref 1.15–1.33)
CALCIUM SERPL-MCNC: 7.9 MG/DL — LOW (ref 8.4–10.5)
CALCIUM SERPL-MCNC: 9 MG/DL — SIGNIFICANT CHANGE UP (ref 8.4–10.5)
CHLORIDE BLDV-SCNC: 101 MMOL/L — SIGNIFICANT CHANGE UP (ref 96–108)
CHLORIDE SERPL-SCNC: 100 MMOL/L — SIGNIFICANT CHANGE UP (ref 96–108)
CHLORIDE SERPL-SCNC: 102 MMOL/L — SIGNIFICANT CHANGE UP (ref 96–108)
CO2 BLDV-SCNC: 31 MMOL/L — HIGH (ref 22–26)
CO2 SERPL-SCNC: 21 MMOL/L — LOW (ref 22–31)
CO2 SERPL-SCNC: 24 MMOL/L — SIGNIFICANT CHANGE UP (ref 22–31)
COD CRY URNS QL: ABNORMAL
COLOR SPEC: YELLOW — SIGNIFICANT CHANGE UP
CREAT SERPL-MCNC: 0.59 MG/DL — SIGNIFICANT CHANGE UP (ref 0.5–1.3)
CREAT SERPL-MCNC: 0.75 MG/DL — SIGNIFICANT CHANGE UP (ref 0.5–1.3)
CRP SERPL-MCNC: 132 MG/L — HIGH (ref 0–4)
DIFF PNL FLD: NEGATIVE — SIGNIFICANT CHANGE UP
EGFR: 84 ML/MIN/1.73M2 — SIGNIFICANT CHANGE UP
EGFR: 95 ML/MIN/1.73M2 — SIGNIFICANT CHANGE UP
EOSINOPHIL # BLD AUTO: 0.07 K/UL — SIGNIFICANT CHANGE UP (ref 0–0.5)
EOSINOPHIL # BLD AUTO: 0.2 K/UL — SIGNIFICANT CHANGE UP (ref 0–0.5)
EOSINOPHIL NFR BLD AUTO: 0.3 % — SIGNIFICANT CHANGE UP (ref 0–6)
EOSINOPHIL NFR BLD AUTO: 1.7 % — SIGNIFICANT CHANGE UP (ref 0–6)
EPI CELLS # UR: 1 /HPF — SIGNIFICANT CHANGE UP
ERYTHROCYTE [SEDIMENTATION RATE] IN BLOOD: 120 MM/HR — HIGH (ref 0–20)
FLUAV AG NPH QL: SIGNIFICANT CHANGE UP
FLUBV AG NPH QL: SIGNIFICANT CHANGE UP
GAS PNL BLDA: SIGNIFICANT CHANGE UP
GAS PNL BLDV: 137 MMOL/L — SIGNIFICANT CHANGE UP (ref 136–145)
GAS PNL BLDV: SIGNIFICANT CHANGE UP
GAS PNL BLDV: SIGNIFICANT CHANGE UP
GLUCOSE BLDV-MCNC: 250 MG/DL — HIGH (ref 70–99)
GLUCOSE SERPL-MCNC: 224 MG/DL — HIGH (ref 70–99)
GLUCOSE SERPL-MCNC: 296 MG/DL — HIGH (ref 70–99)
GLUCOSE UR QL: ABNORMAL
HCO3 BLDV-SCNC: 30 MMOL/L — HIGH (ref 22–29)
HCT VFR BLD CALC: 34.8 % — SIGNIFICANT CHANGE UP (ref 34.5–45)
HCT VFR BLD CALC: 35.2 % — SIGNIFICANT CHANGE UP (ref 34.5–45)
HCT VFR BLD CALC: 38.8 % — SIGNIFICANT CHANGE UP (ref 34.5–45)
HCT VFR BLDA CALC: 35 % — SIGNIFICANT CHANGE UP (ref 34.5–46.5)
HGB BLD CALC-MCNC: 11.8 G/DL — SIGNIFICANT CHANGE UP (ref 11.7–16.1)
HGB BLD-MCNC: 10.1 G/DL — LOW (ref 11.5–15.5)
HGB BLD-MCNC: 10.4 G/DL — LOW (ref 11.5–15.5)
HGB BLD-MCNC: 11.3 G/DL — LOW (ref 11.5–15.5)
HYALINE CASTS # UR AUTO: 4 /LPF — HIGH (ref 0–2)
IMM GRANULOCYTES NFR BLD AUTO: 1 % — SIGNIFICANT CHANGE UP (ref 0–1.5)
INR BLD: 1.28 RATIO — HIGH (ref 0.88–1.16)
INR BLD: 1.34 RATIO — HIGH (ref 0.88–1.16)
KETONES UR-MCNC: ABNORMAL
LACTATE BLDV-MCNC: 1.6 MMOL/L — SIGNIFICANT CHANGE UP (ref 0.7–2)
LEUKOCYTE ESTERASE UR-ACNC: NEGATIVE — SIGNIFICANT CHANGE UP
LYMPHOCYTES # BLD AUTO: 1.42 K/UL — SIGNIFICANT CHANGE UP (ref 1–3.3)
LYMPHOCYTES # BLD AUTO: 1.91 K/UL — SIGNIFICANT CHANGE UP (ref 1–3.3)
LYMPHOCYTES # BLD AUTO: 12.1 % — LOW (ref 13–44)
LYMPHOCYTES # BLD AUTO: 8.3 % — LOW (ref 13–44)
MAGNESIUM SERPL-MCNC: 2.5 MG/DL — SIGNIFICANT CHANGE UP (ref 1.6–2.6)
MANUAL SMEAR VERIFICATION: SIGNIFICANT CHANGE UP
MCHC RBC-ENTMCNC: 20.8 PG — LOW (ref 27–34)
MCHC RBC-ENTMCNC: 21 PG — LOW (ref 27–34)
MCHC RBC-ENTMCNC: 21 PG — LOW (ref 27–34)
MCHC RBC-ENTMCNC: 29 GM/DL — LOW (ref 32–36)
MCHC RBC-ENTMCNC: 29.1 GM/DL — LOW (ref 32–36)
MCHC RBC-ENTMCNC: 29.5 GM/DL — LOW (ref 32–36)
MCV RBC AUTO: 70.4 FL — LOW (ref 80–100)
MCV RBC AUTO: 72 FL — LOW (ref 80–100)
MCV RBC AUTO: 72.2 FL — LOW (ref 80–100)
MICROCYTES BLD QL: SLIGHT — SIGNIFICANT CHANGE UP
MONOCYTES # BLD AUTO: 1.35 K/UL — HIGH (ref 0–0.9)
MONOCYTES # BLD AUTO: 2.01 K/UL — HIGH (ref 0–0.9)
MONOCYTES NFR BLD AUTO: 17.2 % — HIGH (ref 2–14)
MONOCYTES NFR BLD AUTO: 5.9 % — SIGNIFICANT CHANGE UP (ref 2–14)
NEUTROPHILS # BLD AUTO: 19.28 K/UL — HIGH (ref 1.8–7.4)
NEUTROPHILS # BLD AUTO: 8.08 K/UL — HIGH (ref 1.8–7.4)
NEUTROPHILS NFR BLD AUTO: 69 % — SIGNIFICANT CHANGE UP (ref 43–77)
NEUTROPHILS NFR BLD AUTO: 84.2 % — HIGH (ref 43–77)
NITRITE UR-MCNC: NEGATIVE — SIGNIFICANT CHANGE UP
NRBC # BLD: 0 /100 WBCS — SIGNIFICANT CHANGE UP (ref 0–0)
NRBC # BLD: 0 /100 WBCS — SIGNIFICANT CHANGE UP (ref 0–0)
NT-PROBNP SERPL-SCNC: 764 PG/ML — HIGH (ref 0–300)
PCO2 BLDV: 48 MMHG — HIGH (ref 39–42)
PH BLDV: 7.4 — SIGNIFICANT CHANGE UP (ref 7.32–7.43)
PH UR: 6 — SIGNIFICANT CHANGE UP (ref 5–8)
PHOSPHATE SERPL-MCNC: 3.5 MG/DL — SIGNIFICANT CHANGE UP (ref 2.5–4.5)
PLAT MORPH BLD: NORMAL — SIGNIFICANT CHANGE UP
PLATELET # BLD AUTO: 341 K/UL — SIGNIFICANT CHANGE UP (ref 150–400)
PLATELET # BLD AUTO: 346 K/UL — SIGNIFICANT CHANGE UP (ref 150–400)
PLATELET # BLD AUTO: 371 K/UL — SIGNIFICANT CHANGE UP (ref 150–400)
PO2 BLDV: 37 MMHG — SIGNIFICANT CHANGE UP (ref 25–45)
POIKILOCYTOSIS BLD QL AUTO: SLIGHT — SIGNIFICANT CHANGE UP
POLYCHROMASIA BLD QL SMEAR: SLIGHT — SIGNIFICANT CHANGE UP
POTASSIUM BLDV-SCNC: 3.8 MMOL/L — SIGNIFICANT CHANGE UP (ref 3.5–5.1)
POTASSIUM SERPL-MCNC: 3.9 MMOL/L — SIGNIFICANT CHANGE UP (ref 3.5–5.3)
POTASSIUM SERPL-MCNC: 4.7 MMOL/L — SIGNIFICANT CHANGE UP (ref 3.5–5.3)
POTASSIUM SERPL-SCNC: 3.9 MMOL/L — SIGNIFICANT CHANGE UP (ref 3.5–5.3)
POTASSIUM SERPL-SCNC: 4.7 MMOL/L — SIGNIFICANT CHANGE UP (ref 3.5–5.3)
PROT SERPL-MCNC: 6.6 G/DL — SIGNIFICANT CHANGE UP (ref 6–8.3)
PROT SERPL-MCNC: 7.3 G/DL — SIGNIFICANT CHANGE UP (ref 6–8.3)
PROT UR-MCNC: ABNORMAL
PROTHROM AB SERPL-ACNC: 14.8 SEC — HIGH (ref 10.5–13.4)
PROTHROM AB SERPL-ACNC: 15.4 SEC — HIGH (ref 10.5–13.4)
RBC # BLD: 4.82 M/UL — SIGNIFICANT CHANGE UP (ref 3.8–5.2)
RBC # BLD: 5 M/UL — SIGNIFICANT CHANGE UP (ref 3.8–5.2)
RBC # BLD: 5.39 M/UL — HIGH (ref 3.8–5.2)
RBC # FLD: 16.6 % — HIGH (ref 10.3–14.5)
RBC BLD AUTO: SIGNIFICANT CHANGE UP
RBC CASTS # UR COMP ASSIST: 1 /HPF — SIGNIFICANT CHANGE UP (ref 0–4)
RSV RNA NPH QL NAA+NON-PROBE: SIGNIFICANT CHANGE UP
SAO2 % BLDV: 54.4 % — LOW (ref 67–88)
SARS-COV-2 RNA SPEC QL NAA+PROBE: SIGNIFICANT CHANGE UP
SODIUM SERPL-SCNC: 139 MMOL/L — SIGNIFICANT CHANGE UP (ref 135–145)
SODIUM SERPL-SCNC: 140 MMOL/L — SIGNIFICANT CHANGE UP (ref 135–145)
SP GR SPEC: 1.02 — SIGNIFICANT CHANGE UP (ref 1.01–1.02)
TROPONIN T, HIGH SENSITIVITY RESULT: 74 NG/L — HIGH (ref 0–51)
UROBILINOGEN FLD QL: ABNORMAL
WBC # BLD: 11.71 K/UL — HIGH (ref 3.8–10.5)
WBC # BLD: 17.68 K/UL — HIGH (ref 3.8–10.5)
WBC # BLD: 22.91 K/UL — HIGH (ref 3.8–10.5)
WBC # FLD AUTO: 11.71 K/UL — HIGH (ref 3.8–10.5)
WBC # FLD AUTO: 17.68 K/UL — HIGH (ref 3.8–10.5)
WBC # FLD AUTO: 22.91 K/UL — HIGH (ref 3.8–10.5)
WBC UR QL: 1 /HPF — SIGNIFICANT CHANGE UP (ref 0–5)

## 2022-07-02 PROCEDURE — 36556 INSERT NON-TUNNEL CV CATH: CPT | Mod: GC

## 2022-07-02 PROCEDURE — 71045 X-RAY EXAM CHEST 1 VIEW: CPT | Mod: 26,76

## 2022-07-02 PROCEDURE — 73630 X-RAY EXAM OF FOOT: CPT | Mod: 26,RT

## 2022-07-02 PROCEDURE — 36680 INSERT NEEDLE BONE CAVITY: CPT | Mod: GC

## 2022-07-02 PROCEDURE — 99291 CRITICAL CARE FIRST HOUR: CPT

## 2022-07-02 PROCEDURE — 99285 EMERGENCY DEPT VISIT HI MDM: CPT | Mod: 25,GC

## 2022-07-02 PROCEDURE — 93010 ELECTROCARDIOGRAM REPORT: CPT | Mod: NC,GC,59

## 2022-07-02 RX ORDER — ACETAMINOPHEN 500 MG
1000 TABLET ORAL ONCE
Refills: 0 | Status: COMPLETED | OUTPATIENT
Start: 2022-07-02 | End: 2022-07-02

## 2022-07-02 RX ORDER — FENTANYL CITRATE 50 UG/ML
0.5 INJECTION INTRAVENOUS
Qty: 5000 | Refills: 0 | Status: DISCONTINUED | OUTPATIENT
Start: 2022-07-02 | End: 2022-07-04

## 2022-07-02 RX ORDER — VANCOMYCIN HCL 1 G
1000 VIAL (EA) INTRAVENOUS ONCE
Refills: 0 | Status: COMPLETED | OUTPATIENT
Start: 2022-07-02 | End: 2022-07-02

## 2022-07-02 RX ORDER — PROPOFOL 10 MG/ML
25 INJECTION, EMULSION INTRAVENOUS ONCE
Refills: 0 | Status: COMPLETED | OUTPATIENT
Start: 2022-07-02 | End: 2022-07-02

## 2022-07-02 RX ORDER — MEROPENEM 1 G/30ML
1000 INJECTION INTRAVENOUS EVERY 8 HOURS
Refills: 0 | Status: DISCONTINUED | OUTPATIENT
Start: 2022-07-02 | End: 2022-07-08

## 2022-07-02 RX ORDER — EPINEPHRINE 0.3 MG/.3ML
0.3 INJECTION INTRAMUSCULAR; SUBCUTANEOUS ONCE
Refills: 0 | Status: COMPLETED | OUTPATIENT
Start: 2022-07-02 | End: 2022-07-02

## 2022-07-02 RX ORDER — ALBUTEROL 90 UG/1
2.5 AEROSOL, METERED ORAL ONCE
Refills: 0 | Status: COMPLETED | OUTPATIENT
Start: 2022-07-02 | End: 2022-07-02

## 2022-07-02 RX ORDER — SODIUM CHLORIDE 9 MG/ML
1000 INJECTION INTRAMUSCULAR; INTRAVENOUS; SUBCUTANEOUS ONCE
Refills: 0 | Status: COMPLETED | OUTPATIENT
Start: 2022-07-02 | End: 2022-07-02

## 2022-07-02 RX ORDER — FENTANYL CITRATE 50 UG/ML
50 INJECTION INTRAVENOUS ONCE
Refills: 0 | Status: DISCONTINUED | OUTPATIENT
Start: 2022-07-02 | End: 2022-07-02

## 2022-07-02 RX ORDER — CHLORHEXIDINE GLUCONATE 213 G/1000ML
15 SOLUTION TOPICAL EVERY 12 HOURS
Refills: 0 | Status: DISCONTINUED | OUTPATIENT
Start: 2022-07-02 | End: 2022-07-04

## 2022-07-02 RX ORDER — FAMOTIDINE 10 MG/ML
20 INJECTION INTRAVENOUS DAILY
Refills: 0 | Status: DISCONTINUED | OUTPATIENT
Start: 2022-07-02 | End: 2022-07-02

## 2022-07-02 RX ORDER — HYDROCORTISONE 20 MG
100 TABLET ORAL ONCE
Refills: 0 | Status: COMPLETED | OUTPATIENT
Start: 2022-07-02 | End: 2022-07-02

## 2022-07-02 RX ORDER — DEXMEDETOMIDINE HYDROCHLORIDE IN 0.9% SODIUM CHLORIDE 4 UG/ML
0.2 INJECTION INTRAVENOUS
Qty: 200 | Refills: 0 | Status: DISCONTINUED | OUTPATIENT
Start: 2022-07-02 | End: 2022-07-05

## 2022-07-02 RX ORDER — HEPARIN SODIUM 5000 [USP'U]/ML
INJECTION INTRAVENOUS; SUBCUTANEOUS
Qty: 25000 | Refills: 0 | Status: DISCONTINUED | OUTPATIENT
Start: 2022-07-02 | End: 2022-07-06

## 2022-07-02 RX ORDER — MAGNESIUM SULFATE 500 MG/ML
2 VIAL (ML) INJECTION ONCE
Refills: 0 | Status: COMPLETED | OUTPATIENT
Start: 2022-07-02 | End: 2022-07-02

## 2022-07-02 RX ORDER — CEFEPIME 1 G/1
2000 INJECTION, POWDER, FOR SOLUTION INTRAMUSCULAR; INTRAVENOUS ONCE
Refills: 0 | Status: COMPLETED | OUTPATIENT
Start: 2022-07-02 | End: 2022-07-02

## 2022-07-02 RX ORDER — ACETAMINOPHEN 500 MG
975 TABLET ORAL ONCE
Refills: 0 | Status: COMPLETED | OUTPATIENT
Start: 2022-07-02 | End: 2022-07-02

## 2022-07-02 RX ORDER — PROPOFOL 10 MG/ML
15 INJECTION, EMULSION INTRAVENOUS
Qty: 500 | Refills: 0 | Status: DISCONTINUED | OUTPATIENT
Start: 2022-07-02 | End: 2022-07-03

## 2022-07-02 RX ORDER — DIPHENHYDRAMINE HCL 50 MG
50 CAPSULE ORAL ONCE
Refills: 0 | Status: COMPLETED | OUTPATIENT
Start: 2022-07-02 | End: 2022-07-02

## 2022-07-02 RX ORDER — ONDANSETRON 8 MG/1
4 TABLET, FILM COATED ORAL ONCE
Refills: 0 | Status: COMPLETED | OUTPATIENT
Start: 2022-07-02 | End: 2022-07-02

## 2022-07-02 RX ORDER — LIDOCAINE HCL 20 MG/ML
70 VIAL (ML) INJECTION ONCE
Refills: 0 | Status: COMPLETED | OUTPATIENT
Start: 2022-07-02 | End: 2022-07-02

## 2022-07-02 RX ORDER — FAMOTIDINE 10 MG/ML
20 INJECTION INTRAVENOUS DAILY
Refills: 0 | Status: DISCONTINUED | OUTPATIENT
Start: 2022-07-02 | End: 2022-07-11

## 2022-07-02 RX ORDER — FAMOTIDINE 10 MG/ML
20 INJECTION INTRAVENOUS ONCE
Refills: 0 | Status: COMPLETED | OUTPATIENT
Start: 2022-07-02 | End: 2022-07-02

## 2022-07-02 RX ORDER — NOREPINEPHRINE BITARTRATE/D5W 8 MG/250ML
0.05 PLASTIC BAG, INJECTION (ML) INTRAVENOUS
Qty: 8 | Refills: 0 | Status: DISCONTINUED | OUTPATIENT
Start: 2022-07-02 | End: 2022-07-04

## 2022-07-02 RX ORDER — SODIUM CHLORIDE 9 MG/ML
1000 INJECTION, SOLUTION INTRAVENOUS
Refills: 0 | Status: DISCONTINUED | OUTPATIENT
Start: 2022-07-02 | End: 2022-07-03

## 2022-07-02 RX ADMIN — Medication 150 GRAM(S): at 17:25

## 2022-07-02 RX ADMIN — ALBUTEROL 2.5 MILLIGRAM(S): 90 AEROSOL, METERED ORAL at 17:30

## 2022-07-02 RX ADMIN — Medication 2 MILLIGRAM(S): at 18:40

## 2022-07-02 RX ADMIN — SODIUM CHLORIDE 75 MILLILITER(S): 9 INJECTION, SOLUTION INTRAVENOUS at 22:25

## 2022-07-02 RX ADMIN — CEFEPIME 2000 MILLIGRAM(S): 1 INJECTION, POWDER, FOR SOLUTION INTRAMUSCULAR; INTRAVENOUS at 18:02

## 2022-07-02 RX ADMIN — Medication 400 MILLIGRAM(S): at 18:14

## 2022-07-02 RX ADMIN — HEPARIN SODIUM 1100 UNIT(S)/HR: 5000 INJECTION INTRAVENOUS; SUBCUTANEOUS at 22:25

## 2022-07-02 RX ADMIN — FAMOTIDINE 20 MILLIGRAM(S): 10 INJECTION INTRAVENOUS at 17:05

## 2022-07-02 RX ADMIN — Medication 5.86 MICROGRAM(S)/KG/MIN: at 22:25

## 2022-07-02 RX ADMIN — PROPOFOL 5.72 MICROGRAM(S)/KG/MIN: 10 INJECTION, EMULSION INTRAVENOUS at 22:28

## 2022-07-02 RX ADMIN — Medication 20 MILLIGRAM(S): at 22:35

## 2022-07-02 RX ADMIN — PROPOFOL 5.72 MICROGRAM(S)/KG/MIN: 10 INJECTION, EMULSION INTRAVENOUS at 17:55

## 2022-07-02 RX ADMIN — FENTANYL CITRATE 1.59 MICROGRAM(S)/KG/HR: 50 INJECTION INTRAVENOUS at 22:43

## 2022-07-02 RX ADMIN — FAMOTIDINE 20 MILLIGRAM(S): 10 INJECTION INTRAVENOUS at 22:40

## 2022-07-02 RX ADMIN — FENTANYL CITRATE 1.59 MICROGRAM(S)/KG/HR: 50 INJECTION INTRAVENOUS at 20:08

## 2022-07-02 RX ADMIN — FENTANYL CITRATE 50 MICROGRAM(S): 50 INJECTION INTRAVENOUS at 18:55

## 2022-07-02 RX ADMIN — EPINEPHRINE 0.3 MILLIGRAM(S): 0.3 INJECTION INTRAMUSCULAR; SUBCUTANEOUS at 17:48

## 2022-07-02 RX ADMIN — CEFEPIME 100 MILLIGRAM(S): 1 INJECTION, POWDER, FOR SOLUTION INTRAMUSCULAR; INTRAVENOUS at 15:54

## 2022-07-02 RX ADMIN — MEROPENEM 100 MILLIGRAM(S): 1 INJECTION INTRAVENOUS at 22:32

## 2022-07-02 RX ADMIN — Medication 4 MILLIGRAM(S): at 20:55

## 2022-07-02 RX ADMIN — Medication 70 MILLIGRAM(S): at 17:07

## 2022-07-02 RX ADMIN — Medication 100 MILLIGRAM(S): at 17:05

## 2022-07-02 RX ADMIN — EPINEPHRINE 0.3 MILLIGRAM(S): 0.3 INJECTION INTRAMUSCULAR; SUBCUTANEOUS at 17:44

## 2022-07-02 RX ADMIN — ONDANSETRON 4 MILLIGRAM(S): 8 TABLET, FILM COATED ORAL at 18:36

## 2022-07-02 RX ADMIN — PROPOFOL 5.72 MICROGRAM(S)/KG/MIN: 10 INJECTION, EMULSION INTRAVENOUS at 20:08

## 2022-07-02 RX ADMIN — Medication 50 MILLIGRAM(S): at 17:00

## 2022-07-02 RX ADMIN — Medication 2 GRAM(S): at 18:06

## 2022-07-02 RX ADMIN — SODIUM CHLORIDE 1000 MILLILITER(S): 9 INJECTION INTRAMUSCULAR; INTRAVENOUS; SUBCUTANEOUS at 19:19

## 2022-07-02 RX ADMIN — PROPOFOL 25 MILLIGRAM(S): 10 INJECTION, EMULSION INTRAVENOUS at 18:40

## 2022-07-02 NOTE — ED PROVIDER NOTE - NSICDXPASTSURGICALHX_GEN_ALL_CORE_FT
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 by Dr Zapien    Rectal bleeding exam under anesthesia ([facility]) 2/2018    S/P bronchoscopy 6/5/2018 - [facility] (Dr Zapien) no evidence of tracheobronchomalacia in trachea or bronchial tubes

## 2022-07-02 NOTE — ED PROCEDURE NOTE - ATTENDING CONTRIBUTION TO CARE
I, Gena Mark, performed a history and physical exam of the patient and discussed their management with the resident and /or advanced care provider. I reviewed the resident and /or ACP's note and agree with the documented findings and plan of care except where otherwise noted in my note. I was present and available for all procedures.     agree with above, I supervised procedure
I, Gena Mark, performed a history and physical exam of the patient and discussed their management with the resident and /or advanced care provider. I reviewed the resident and /or ACP's note and agree with the documented findings and plan of care except where otherwise noted in my note. I was present and available for all procedures.     agree with above, I supervised procedure

## 2022-07-02 NOTE — ED PROCEDURE NOTE - CPROC ED TIME OUT STATEMENT1
“Patient's name, , procedure and correct site were confirmed during the [place] Timeout.”
“Patient's name, , procedure and correct site were confirmed during the [place] Timeout.”

## 2022-07-02 NOTE — ED PROVIDER NOTE - CLINICAL SUMMARY MEDICAL DECISION MAKING FREE TEXT BOX
71 y old female with multiple medical issues with PMH MRSA, cellulitis of the foot and chronic non-healing ulcer of the right foot, concern for osteomyelitis. Will obtain bloodwork, ESR and CRP, xray of the right foot to r/o osteomyelitis  Will call podiatry for consultation and admission to the hospital.  ZR 71 y old female with multiple medical issues with PMH MRSA, cellulitis of the foot and chronic non-healing ulcer of the right foot, concern for osteomyelitis. Will obtain bloodwork, ESR and CRP, xray of the right foot to r/o osteomyelitis , cultures  iv antibiotics ,  Will call podiatry for consultation and admission to the hospital.  ZR

## 2022-07-02 NOTE — ED PROVIDER NOTE - CONSTITUTIONAL, MLM
chronically ill looking awake, alert, oriented to person, place, time/situation and in no apparent distress. normal...

## 2022-07-02 NOTE — ED ADULT NURSE REASSESSMENT NOTE - NS ED NURSE REASSESS COMMENT FT1
Received report from primary RN Leida Momin at bedside. Pt. brought from purple area to green D after being intubated s/p anaphylaxis. As per primary RN, pt. received epi, Solu-cortef, pepcid, magnesium, and benadryl and lidocaine after having episode of v tach with a pulse s/p epi administration.  Pt. was intubated prior to being transferred to critical area. Pt. on propofol for sedation. Femoral line being placed at bedside by MD. MICU to see patient for dispo. Received report from primary RN Leida Momin at bedside. Pt. brought from purple area to green D after being intubated s/p anaphylaxis. As per primary RN, pt. received epi, Solu-cortef, pepcid, magnesium, and benadryl and lidocaine after having episode of v tach with a pulse s/p epi administration.  Pt. was intubated prior to being transferred to critical area. Pt. on propofol for sedation. Femoral line being placed at bedside by MD. MICU to see patient for dispo. Port accessed by RADHA Casarez.

## 2022-07-02 NOTE — ED ADULT NURSE REASSESSMENT NOTE - NS ED NURSE REASSESS COMMENT FT1
1505 Pt power port accessed right anterior chest wall Pending chest x-ray to confirm placement AlexandreN

## 2022-07-02 NOTE — ED PROVIDER NOTE - OBJECTIVE STATEMENT
Patient is a 71 year old female with PMH DM, COPD (chronic asthma), history of Chronic Adrenal Insufficiency on Chronic prednisone history of colorectal cancer operated 2018, Hx of CAD, Chronic A fib on Eliquis, in March was hospitalized with pna, seen by wound care clinic yesterday for unhealing ulcer on her right foot. Has a history of MRSA before and she was sent to the ER for possible admission and IV antibiotics. Daughter also noticed patient was altered since this morning with episodes of nausea and vomiting. Admits to chills and an episode or urinary incontinence.     Bon Mena

## 2022-07-02 NOTE — H&P ADULT - ASSESSMENT
Patient is a 71 year old female with PMH DM, COPD (chronic asthma), history of Chronic Adrenal Insufficiency on Chronic prednisone history of colorectal cancer s/p resection (colostomy bag), Hx of CAD, Chronic A fib on Eliquis, and tracheomalacia and multiple intubations presenting with right foot wound concerning for OM s/p wound care. Course complicated by medication induced anaphylaxis 2/2 cefepime in setting of penicillin allergy. Further complicated by V-tach with pulse requiring lidocaine for . Now intubated and sedated for hypoxia and airway protection.     NEURO:  #Mental status: baseline non altered A&O3. Concern for confusion at home per daughter.  -Currently sedated    CV:  #Arrythmia:   - Vtach status post epinephrine IM x 2. Most likely 2/2 sympathomimetic surge  - EKG with ST/T wave changes concerning for ischemia.   - F/U Echo  - F/u Troponin   - Monitor on tele    #AFib:   - Hx of Afib on Eliquis at home.   - Most recent Echo with EF 67% Moderate-severe aortic regurgitation and mild tricuspid regurgitation. No visible evidence of LV dysfunction  - Rate control: On Mexiletine  - Monitor telemetry    #Hemodynamically stable    PULM:  #COPD:   - On Ipratropium and budesonide-formoterol Not on home O2  - Continue Spiriva 18mcg    RENAL:  - Creatinine WNL   - I's and O's UOP:  - Amezcua catether    GI:  - No addressable issues   #Diet: Start   #Bowel regiment:    ENDO:  #DM2: HbA1c ***   - Insulin Sliding Scale    METABOLIC:  #Electrolyte abnormalities    HEMATOLOGIC:  #CBC results show  #Coag panel shows  #DVT prophylaxis with     ID:  #Pt without strong objective or clinical evidence of infection. Will observe off antibiotics    SKIN:  #Lines:  #Decubitus ulcers:   Patient is a 71 year old female with PMH DM, COPD (chronic asthma), history of Chronic Adrenal Insufficiency on Chronic prednisone history of colorectal cancer s/p resection (colostomy bag), Hx of CAD, Chronic A fib on Eliquis, and tracheomalacia and multiple intubations presenting with right foot wound concerning for OM s/p wound care. Course complicated by medication induced anaphylaxis 2/2 cefepime in setting of penicillin allergy. Further complicated by V-tach with pulse requiring lidocaine for . Now intubated and sedated for hypoxia and airway protection.     NEURO:  #Mental status: baseline non altered A&O3. Concern for confusion at home per daughter.  -Currently sedated    CV:  #Arrythmia:   - Vtach status post epinephrine IM x 2. Most likely 2/2 sympathomimetic surge  - EKG with ST/T wave changes concerning for ischemia.   - F/U Echo  - F/u Troponin   - Monitor on tele    #AFib:   - Hx of Afib on Eliquis at home.   - Most recent Echo with EF 67% Moderate-severe aortic regurgitation and mild tricuspid regurgitation. No visible evidence of LV dysfunction  - Rate control: On Mexiletine  - Can start Heparin while intubated  - Monitor telemetry    #Hemodynamically stable    PULM:  #Anaphylaxis  - Presented with facial swelling, Hypoxia, lower extremity rash s/p cefepime  - Likely 2/2 cefepime administration in setting of penicillin allergy. Possibly 2/2 to Vanc? as patient also had erythematous lower extremities  - S/P Epi x 2  - Currently intubated for airway protection  - Skin checks and monitor for airway edema prior to intubation    #COPD/Asthama  - S/P Mag+ in ED  - On Ipratropium and budesonide-formoterol Not on home O2  - Continue Spiriva 18mcg  - Duonebs Q6h    RENAL:  - Creatinine WNL   - I's and O's UOP:  - Amezcua catether    GI:  #Colorectal cancer S/P resection with colostomy  - Monitor ostomy output  - Ostomy care    #Diet: Can consider starting tube feeds if patient remains intubated for prolonged period  #Bowel regimen: On Senna, Miralax and Lactulose at home    ENDO:  #DM2: HbA1c 2022 8%  - On Lantus 10 units at bed time and 4 units TID with meals  - Insulin Sliding Scale  - Finger sticks BG Q6hrs  - Will continue pregabalin diabetic neuropathy     #Adrenal insufficiency  - On Chronic steroid therapy at home Methylprednisolone 8mg QD  - S/P Stress dose steroids in the ED Solu-Cortef 100mg IV x 1  - Will continue methylprednisolone 8mg QD     METABOLIC:  - No addressable issues    HEMATOLOGIC:  #CBC results with baseline Anemia Hb 10.1  - Transfuse Hb < 7 and PLT <50 if bleeding, <20 if fever, < 10 at risk for spontaneous bleed  #DVT prophylaxis with Heparin     ID:  #Sepsis  - Most likely 2/2 lower extremity foot wound concerning for OM  - Leucocytosis with hypotension and tachycardia in setting of probably foci of infection  - F/U wound cultures  - Maintain 2 large bore IVs  - F/U BCx  - UA negative    #Foot Wound  - Right foot wound in the setting of poorly controlled diabetes  - Podiatry on board for wound care  - F/U MRI of right foot to r/o Osteomyelitis        SKIN:   - Pod plan likely local wound care pending MR results  - F/U Wound culture    #Lines:  #Decubitus ulcers:

## 2022-07-02 NOTE — ED PROCEDURE NOTE - CPROC ED INFUS LINE DETAIL1
The location was identified, and the area was draped and prepped./The catheter was placed using sterile technique.
The location was identified, and the area was draped and prepped./The catheter was placed using sterile technique./Ultrasound guidance was used./The guidewire was recovered./All lumen(s) aspirated and flushed without difficulty.

## 2022-07-02 NOTE — ED ADULT NURSE REASSESSMENT NOTE - NS ED NURSE REASSESS COMMENT FT1
Pt agitated and fighting the tube, attempting to sit up. MD Mark at bedside. MD Mark adm 25mg propofol IV push at this time.

## 2022-07-02 NOTE — ED ADULT NURSE REASSESSMENT NOTE - NS ED NURSE REASSESS COMMENT FT1
1807 pt at 1744 was given cefepime as ordered asn 20 minutes after the daughter came out and pt had difficulty breathing and pox declined ans rash appeared al over the her body Pt always had a pulse during the code.  pt intubated in room 20 Pt was given epi IM 2 times at 1644/1648.  pt has wheezing pt went to v tach and Lido given and pt broke  See flow sheet for vital sign Pt always had a pulse Intubated 7.0 tube  at 22 lip line and placed on the vent .  Pt also given allergic reaction meds also see EMR. Pt started on Propofol as ordered. Pt brought over to CC D and report given to anam RN  in cc Daughter was  explained ans supportive care provided.  Central line to be placed and pt has IO also placed .  Pt stable ans sinus tach ans pox 100% on the Vent Jessenia

## 2022-07-02 NOTE — ED ADULT NURSE NOTE - OBJECTIVE STATEMENT
Patient is a 71 year old female with BIBa from home PMH DM, COPD (chronic asthma), history of Chronic Adrenal Insufficiency on Chronic prednisone history of colorectal cancer operated 2018, Hx of CAD, Chronic A fib on Eliquis, in March was hospitalized with pna, seen by wound care clinic yesterday for unhealing ulcer on her right foot. Has a history of MRSA before and she was sent to the ER for possible admission and IV antibiotics. Daughter also noticed patient was altered since this morning with episodes of nausea and vomiting. Admits to chills and an episode or urinary incontinence.  pt has colostomy bag ans no rectum unable to do rectal temp Pt straight cath for urine ans sent Pt power port accessed and bloods sent as ordered [id number]

## 2022-07-02 NOTE — H&P ADULT - NSHPPHYSICALEXAM_GEN_ALL_CORE
VITALS:   T(C): 38.2 (07-02-22 @ 15:00), Max: 38.2 (07-02-22 @ 15:00)  HR: 114 (07-02-22 @ 18:30) (99 - 117)  BP: 106/71 (07-02-22 @ 18:30) (94/73 - 146/64)  RR: 15 (07-02-22 @ 18:13) (15 - 16)  SpO2: 100% (07-02-22 @ 18:26) (92% - 100%)    GENERAL: NAD, lying in bed comfortably  HEAD:  Atraumatic, normocephalic  EYES: EOMI, PERRLA, conjunctiva and sclera clear. Mild Exophthalmus   ENT: Moist mucous membranes  NECK: Supple, no JVD  HEART: Irregullary irregular rate and rhythm, no murmurs, rubs, or gallops  LUNGS: Intubated. Coarse breath sounds anteriorly b/l. Clear to auscultation bilaterally, no crackles, wheezing, or rhonchi  ABDOMEN: Soft, nontender, nondistended, +BS  EXTREMITIES: right foot medial navicular wound to bone w periwound erythema extending distally and proximally, no malodor, no bogginess, mild serous drainage noted, tracking proximally 1cm. No edema  NERVOUS SYSTEM:  Sedated  SKIN: Sparse petechiae on the B/L lower extremities with chronic skin changes of the lower extremity. Open wound noted  in the No rashes or lesions  Psych: Normal. normal behavior. normal speech

## 2022-07-02 NOTE — ED PROVIDER NOTE - PROGRESS NOTE DETAILS
podiatry called case discussed will see pt in er ZR patient signed out to me by previous attending at usual time of sign out. prior to my arrival, patient ordered for vanc/cefepime for foot wound infection. Patient became acutely hypoxic to 57% on RA, with facial swelling, tachypnea, decreased breath sounds/wheezing, diffuse rash particularly to lower extremities. concern for anaphylaxis 2/2 cefepime vs less likey acute asthma exacerbation. received epi IM x 2, Mg 2 gm, benadryl, solucortef (for chronic adrenal insufficiency), pepcid, called for bipap but became acutely altered, needed to be intubated for airway protection and predicted clinical course. has hx of tracheomalacia and multiple intubations, critical airway called and anethesia intubated with first pass success with etomidate and succ. after epi, developed vtach with pulse, lidocaine given with disruption in tachydysrhythmia. 7.0 ETT. placed on mechanical ventillator RR 18., , 100%, peep 5. ABG obtained by respiratory, TLC placed by ER team in R femoral for access. pending xray chest for ETT confirmation although good color change, chest rise, improvement in 02. MICU at bedside patient signed out to me by previous attending at usual time of sign out. prior to my arrival, patient ordered for vanc/cefepime for foot wound infection. Patient became acutely hypoxic to 57% on RA, with facial swelling, tachypnea, decreased breath sounds/wheezing, diffuse rash particularly to lower extremities. concern for anaphylaxis 2/2 cefepime vs less likey acute asthma exacerbation. received epi IM x 2, Mg 2 gm, benadryl, solucortef (for chronic adrenal insufficiency), pepcid, called for bipap but became acutely altered, needed to be intubated for airway protection and predicted clinical course. has hx of tracheomalacia and multiple intubations, critical airway called and anethesia intubated with first pass success with etomidate and succ. after epi, developed vtach with pulse, lidocaine given with disruption in tachydysrhythmia. 7.0 ETT. placed on mechanical ventillator RR 18., , 100%, peep 5. ABG obtained by respiratory, TLC placed by ER team in R femoral for access. pending xray chest for ETT confirmation although good color change, chest rise, improvement in 02. MICU at bedside. EKG with ?MAT vs sinus tachy with PACs, rhythm difficult to interpret, will repeat. has known afib. Of note, daughter yulissa (hcp) states patient is actually FULL CODE, NOT DNR at this time, so order deleted in sunrise. Gena Mark MD Attending Physician- patient has been difficult to sedate, propofol increased with prn pushes of propofol as well as ativan (chart shows valium allergy but patients airway is protected) as well as fentanyl. attempted OGT placement x 2 but patient still clenching/whinching. fent gtt added. will reattempt when patient is more sedated if she has not yet gotten room in micu. reeat ekg shows possible ectopic atrial tachycardia podiatry called case discussed will see pt in er,  also discussed with daughter DNR status ,wants to sign the papers ZR

## 2022-07-02 NOTE — ED ADULT NURSE REASSESSMENT NOTE - NS ED NURSE REASSESS COMMENT FT1
Pt received from previous RN Zelda. Pt noted to be intubated 22 R lip line, w propofol infusing at 36 mcg/kg/min. Pt hypotensive at this time, 1L of NS infusing through femoral central line. Pt noted  have R chest port, w propofol infusion. Pt received from previous RN Zelda. Pt noted to be intubated 22 R lip line, w propofol infusing at 36 mcg/kg/min. Pt hypotensive at this time, 1L of NS infusing through femoral central line. Pt noted  have R chest port, w propofol infusion. Pt noted w colostomy bag to LLQ filled with soft brown stool. Pt received from previous RN Zelda. Pt noted to be intubated 22 R lip line, w propofol infusing at 36 mcg/kg/min. Pt hypotensive at this time, 1L of NS infusing through femoral central line. Pt noted  have R chest port, w propofol infusion. Pt noted w colostomy bag to LLQ filled with soft brown stool. Pending MICU admission. Pt received from previous RN Zelda. Pt noted to be intubated 22 R lip line, w propofol infusing at 30 mcg/kg/min. Pt hypotensive at this time, 1L of NS infusing through femoral central line. Pt noted to  have R chest port, w propofol infusing, noted w R anterior tibia IO. Pt noted w colostomy bag to LLQ filled with soft brown stool. Pending MICU admission.

## 2022-07-02 NOTE — H&P ADULT - NSICDXPASTSURGICALHX_GEN_ALL_CORE_FT
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    H/O pelvic surgery 5 years ago - s/p fracture    H/O total knee replacement, bilateral 5 years ago    History of partial hysterectomy 30 years ago - fibroids    History of sinus surgery multiple sinus surgeries    History of tracheomalacia 2015 - attempted tracheal stenting ([facility])- course complicated by obstruction, respiratory failure, multiple CPR attempts -  stent discontinued; 10/20/2016 Tracheobronchoplasty (Prolene Mesh) performed at [facility] by Dr Zapien    Rectal bleeding exam under anesthesia ([facility]) 2/2018    S/P bronchoscopy 6/5/2018 - [place] (Dr Zapien) no evidence of tracheobronchomalacia in trachea or bronchial tubes

## 2022-07-02 NOTE — ED ADULT NURSE NOTE - NSICDXPASTSURGICALHX_GEN_ALL_CORE_FT
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    H/O pelvic surgery 5 years ago - s/p fracture    H/O total knee replacement, bilateral 5 years ago    History of partial hysterectomy 30 years ago - fibroids    History of sinus surgery multiple sinus surgeries    History of tracheomalacia 2015 - attempted tracheal stenting ([facility])- course complicated by obstruction, respiratory failure, multiple CPR attempts -  stent discontinued; 10/20/2016 Tracheobronchoplasty (Prolene Mesh) performed at [facility] by Dr Zapien    Rectal bleeding exam under anesthesia ([facility]) 2/2018    S/P bronchoscopy 6/5/2018 - [place] (Dr Zapien) no evidence of tracheobronchomalacia in trachea or bronchial tubes

## 2022-07-02 NOTE — H&P ADULT - NSHPLABSRESULTS_GEN_ALL_CORE
.  LABS:                         11.3   11.71 )-----------( 371      ( 2022 15:43 )             38.8     07-02    140  |  100  |  12  ----------------------------<  224<H>  3.9   |  24  |  0.59    Ca    9.0      2022 15:43    TPro  7.3  /  Alb  3.6  /  TBili  0.4  /  DBili  x   /  AST  17  /  ALT  13  /  AlkPhos  92  07-02    PT/INR - ( 2022 15:43 )   PT: 14.8 sec;   INR: 1.28 ratio         PTT - ( 2022 15:43 )  PTT:32.6 sec  Urinalysis Basic - ( 2022 15:44 )    Color: Yellow / Appearance: Clear / S.024 / pH: x  Gluc: x / Ketone: Large  / Bili: Small / Urobili: 2 mg/dL   Blood: x / Protein: 30 mg/dL / Nitrite: Negative   Leuk Esterase: Negative / RBC: 1 /hpf / WBC 1 /HPF   Sq Epi: x / Non Sq Epi: 1 /hpf / Bacteria: Negative            RADIOLOGY, EKG & ADDITIONAL TESTS: Reviewed. .  LABS:                         11.3   11.71 )-----------( 371      ( 2022 15:43 )             38.8     07-    140  |  100  |  12  ----------------------------<  224<H>  3.9   |  24  |  0.59    Ca    9.0      2022 15:43    TPro  7.3  /  Alb  3.6  /  TBili  0.4  /  DBili  x   /  AST  17  /  ALT  13  /  AlkPhos  92  07-02    PT/INR - ( 2022 15:43 )   PT: 14.8 sec;   INR: 1.28 ratio         PTT - ( 2022 15:43 )  PTT:32.6 sec  Urinalysis Basic - ( 2022 15:44 )    Color: Yellow / Appearance: Clear / S.024 / pH: x  Gluc: x / Ketone: Large  / Bili: Small / Urobili: 2 mg/dL   Blood: x / Protein: 30 mg/dL / Nitrite: Negative   Leuk Esterase: Negative / RBC: 1 /hpf / WBC 1 /HPF   Sq Epi: x / Non Sq Epi: 1 /hpf / Bacteria: Negative            RADIOLOGY, EKG & ADDITIONAL TESTS: Reviewed.    CXR  PROCEDURE DATE:  2022          INTERPRETATION:  EXAMINATION: XR CHEST IMMEDIATE    CLINICAL INDICATION: Sepsis    TECHNIQUE: Single frontal, portable view of the chest was obtained.    COMPARISON: Chest x-ray comparison 5/10/2022    IMPRESSION:  Accessed right chest wall CT compatible power infusion port with tip in   SVC region.  Incompletely imaged obliquely oriented thin linear density projects over   left thoracoabdominal region, correlate clinically.    Clear lungs. No pleural effusions or pneumothorax.    Slightly prominent appearing cardiac and mediastinal silhouettes however   inaccurately assessed on the projection.    Trachea midline.    Osteopenic but otherwise unremarkable osseous structures.

## 2022-07-02 NOTE — H&P ADULT - HISTORY OF PRESENT ILLNESS
Patient is a 71 year old female with PMH DM, COPD (chronic asthma), history of Chronic Adrenal Insufficiency on Chronic prednisone history of colorectal cancer s/p resection (colostomy bag), Hx of CAD, Chronic A fib on Eliquis, and tracheomalacia and multiple intubations. Of note March was hospitalized with pna, seen by wound care clinic (Dr. Gray) yesterday for unhealing ulcer on her right foot. Has a history of MRSA before and she was sent to the ER for possible admission and IV antibiotics. Daughter also noticed patient was altered since this morning with episodes of nausea and vomiting. Admits to chills and an episode or urinary incontinence.     In the ED she was evaluated by podiatry and wound care was performed with debridement and irrigation without plans for surgical intervention and started on antibiotics. MRI was also ordered ot R/O osteo. Patient was first started on Vancomycin, however shortly after starting cefepime patient became acutely hypoxic to 57% on RA, with facial swelling, tachypnea, decreased breath sounds/wheezing, diffuse rash particularly to lower extremities which was concerning for anaphylaxis 2/2 cefepime in setting of previous history of allergy to penicillin unknown reaction per daughter). She received epi IM x 2, and Mg 2gm was started to address possibility of asthma exacerbation. She also received Benadryl, Solu-cortef (for chronic adrenal insufficiency), Pepcid. ED called for bipap but she became acutely altered and needed to be intubated by ENT for airway protection considering history of tracheomalacia. ED course was complicated by vtach with pulse shortly after receiving Epinephrine. She received lidocaine with disruption in tachydysrhythmia. 7.0 ETT. placed on mechanical ventillator RR 18., , 100%, peep 5. ABG obtained by respiratory, TLC placed by ER team in R femoral for access. ET tube was confirmed by X-ray.     Of note, daughter Yoana (HCP) states patient is actually FULL CODE, NOT DNR at this time, so order deleted in sunrise.

## 2022-07-02 NOTE — H&P ADULT - ATTENDING COMMENTS
71F Hx Multiple Allergy, HTN, T2DM, COPD, A.Fib on ELQ, CAD, Adrenal Insufficiency, Colorectal CA Resection and Colostomy, Tracheomalacia S/P Bronchial Thermoplasty, Chronic Rt Foot Wound Infection r/o Osteomyelitis p/w ED and received Cefepime c/w Anaphylactic Reaction, Brief V. Tach required intubation and Vent Support.   - Hypoxic Respiratory Failure on Vent Support for Airway Protection   - Anaphylactic Shock to Cefepime with Facial Rashes and brief V. Tach s/p Lidocaine   - Hemodynamically stable without Pressor now   - Post-ananaphylactic Mx with Steroid, H1 and H2 Blockers   - Consider Allergy-Immunology Consult   - Known Hx of Multiple Intubations and Tracheomalacia surgery   - Empiric ABx changed to IV Meropenem and Vancomycin   - Chronic Steroid supplement for Adrenal Insufficiency   - OGT insertion and enteral feeding plan  - DVT and GI Prophylaxis per Routine   - GOC - Full Code       Patient seen and examined with ICU Resident/Fellow at bedside after lab data, medical records and radiology reports reviewed. I have read and agreeable in general with resident's Documented Note, Assessment and Management Plan which reflected my opinions from bedside round and discussion.   Total Critical Care Time = 45 Min excluding teaching and procedure activity.

## 2022-07-02 NOTE — CONSULT NOTE ADULT - SUBJECTIVE AND OBJECTIVE BOX
Patient is a 71y old  Female who presents with a chief complaint of right foot wound    HPI:  Patient is a 71 year old female with PMH DM, COPD (chronic asthma), history of Chronic Adrenal Insufficiency on Chronic prednisone history of colorectal cancer operated 2018, Hx of CAD, Chronic A fib on Eliquis, in March was hospitalized with pna, seen by wound care clinic yesterday for unhealing ulcer on her right foot. Has a history of MRSA before and she was sent to the ER for possible admission and IV antibiotics. Daughter also noticed patient was altered since this morning with episodes of nausea and vomiting. Admits to chills and an episode or urinary incontinence      PAST MEDICAL & SURGICAL HISTORY:  Atrial fibrillation  paroxysmal, on eliquis      Diabetes  Type 2      COPD (chronic obstructive pulmonary disease)      Adrenal insufficiency  Medrol daily for over 50 years      Aortic insufficiency  moderate AR on echo 5/3/2018      Pelvic fracture      Asthma      Tracheobronchomalacia  diagnosed 2015, s/p bronchial thermoplasty 2016 (Dr Zapien); recent bronchoscopy 6/5/2018 revealed no evidence of tracheobronchomalacia in trachea or bronchial tubes      Colorectal cancer  4/2018- last treatment , chemo and radiation      Rectal bleeding      Seizure  x 1 1/7/18      DVT (deep venous thrombosis)  15-20 years ago, took coumadin      TIA (transient ischemic attack)  multiple, last 5 years ago - presents as right-sided weakness      History of partial hysterectomy  30 years ago - fibroids      H/O total knee replacement, bilateral  5 years ago      History of sinus surgery  multiple sinus surgeries      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      H/O pelvic surgery  5 years ago - s/p fracture      History of tracheomalacia  2015 - attempted tracheal stenting ([facility])- course complicated by obstruction, respiratory failure, multiple CPR attempts -  stent discontinued; 10/20/2016 Tracheobronchoplasty (Prolene Mesh) performed at [facility] by Dr Zapien      S/P bronchoscopy  6/5/2018 - [place] (Dr Zapien) no evidence of tracheobronchomalacia in trachea or bronchial tubes      Rectal bleeding  exam under anesthesia ([facility]) 2/2018          MEDICATIONS  (STANDING):  acetaminophen     Tablet .. 975 milliGRAM(s) Oral once  hydrocortisone sodium succinate Injectable 100 milliGRAM(s) IV Push Once    MEDICATIONS  (PRN):      Allergies    aspirin (Short breath)  Avelox (Short breath; Pruritus)  codeine (Short breath)  Dilaudid (Short breath)  iodine (Short breath; Swelling)  penicillin (Unknown)  shellfish (Anaphylaxis)  tetanus toxoid (Short breath)  Valium (Short breath)    Intolerances        VITALS:    Vital Signs Last 24 Hrs  T(C): 38.2 (02 Jul 2022 15:00), Max: 38.2 (02 Jul 2022 15:00)  T(F): 100.7 (02 Jul 2022 15:00), Max: 100.7 (02 Jul 2022 15:00)  HR: 99 (02 Jul 2022 15:00) (99 - 110)  BP: 146/64 (02 Jul 2022 15:00) (105/67 - 146/64)  BP(mean): --  RR: 16 (02 Jul 2022 15:00) (16 - 16)  SpO2: 92% (02 Jul 2022 15:00) (92% - 93%)    LABS:                          11.3   11.71 )-----------( 371      ( 02 Jul 2022 15:43 )             38.8       07-02    140  |  100  |  12  ----------------------------<  224<H>  3.9   |  24  |  0.59    Ca    9.0      02 Jul 2022 15:43    TPro  7.3  /  Alb  3.6  /  TBili  0.4  /  DBili  x   /  AST  17  /  ALT  13  /  AlkPhos  92  07-02      CAPILLARY BLOOD GLUCOSE          PT/INR - ( 02 Jul 2022 15:43 )   PT: 14.8 sec;   INR: 1.28 ratio         PTT - ( 02 Jul 2022 15:43 )  PTT:32.6 sec    LOWER EXTREMITY PHYSICAL EXAM:    Vascular: DP/PT 2/4 B/L, CFT <3 seconds B/L, Temperature gradient warm to cool B/L  Neuro: Epicritic sensation intact to the level of midfoot B/L  Musculoskeletal/Ortho: unremarkable   Skin: right foot medial navicular wound to bone w periwound erythema extending distally and proximally, no malodor, no bogginess, mild serous drainage noted, tracking proximally 1cm    RADIOLOGY & ADDITIONAL STUDIES:

## 2022-07-02 NOTE — CONSULT NOTE ADULT - ASSESSMENT
72yo F w/ right medial navicular wound  - pt seen and evaluated  - T 100, WBC 11.7, CRP 13  - right foot medial navicular wound to bone w periwound erythema extending distally and proximally, no malodor, no bogginess, mild serous drainage noted, tracking proximally 1cm  - using sterile suture removal kit, fibrotic tissue was removed from the wound, flushed and dressed with DSD  - R foot wound culture taken  - ordered R foot MR r/o OM   - rec admit to medicine  - rec IV vanco zosyn  - pod plan likely local wound care pending MR results, given DNR status, no surgical intervention likely at this admission  - discussed w/ attending  72yo F w/ right medial navicular wound  - pt seen and evaluated  - T 100, WBC 11.7, CRP 13  - right foot medial navicular wound to bone w periwound erythema extending distally and proximally, no malodor, no bogginess, mild serous drainage noted, tracking proximally 1cm  - using sterile suture removal kit, fibrotic tissue was removed from the wound, flushed and dressed with DSD  - R foot wound culture taken  - ordered R foot MR r/o OM or deeper abscess  - rec admit to medicine  - rec IV vanco zosyn  - pod plan likely local wound care pending MR results, given DNR status, no surgical intervention likely at this admission  - discussed w/ attending

## 2022-07-03 LAB
ALBUMIN SERPL ELPH-MCNC: 3.1 G/DL — LOW (ref 3.3–5)
ALP SERPL-CCNC: 94 U/L — SIGNIFICANT CHANGE UP (ref 40–120)
ALT FLD-CCNC: 18 U/L — SIGNIFICANT CHANGE UP (ref 10–45)
ANION GAP SERPL CALC-SCNC: 13 MMOL/L — SIGNIFICANT CHANGE UP (ref 5–17)
APTT BLD: 31.6 SEC — SIGNIFICANT CHANGE UP (ref 27.5–35.5)
APTT BLD: 60.3 SEC — HIGH (ref 27.5–35.5)
APTT BLD: 77.6 SEC — HIGH (ref 27.5–35.5)
AST SERPL-CCNC: 18 U/L — SIGNIFICANT CHANGE UP (ref 10–40)
BASOPHILS # BLD AUTO: 0.01 K/UL — SIGNIFICANT CHANGE UP (ref 0–0.2)
BASOPHILS NFR BLD AUTO: 0.1 % — SIGNIFICANT CHANGE UP (ref 0–2)
BILIRUB SERPL-MCNC: 0.2 MG/DL — SIGNIFICANT CHANGE UP (ref 0.2–1.2)
BUN SERPL-MCNC: 24 MG/DL — HIGH (ref 7–23)
CALCIUM SERPL-MCNC: 8.8 MG/DL — SIGNIFICANT CHANGE UP (ref 8.4–10.5)
CHLORIDE SERPL-SCNC: 102 MMOL/L — SIGNIFICANT CHANGE UP (ref 96–108)
CO2 SERPL-SCNC: 21 MMOL/L — LOW (ref 22–31)
CREAT SERPL-MCNC: 0.62 MG/DL — SIGNIFICANT CHANGE UP (ref 0.5–1.3)
CULTURE RESULTS: SIGNIFICANT CHANGE UP
EGFR: 94 ML/MIN/1.73M2 — SIGNIFICANT CHANGE UP
EOSINOPHIL # BLD AUTO: 0 K/UL — SIGNIFICANT CHANGE UP (ref 0–0.5)
EOSINOPHIL NFR BLD AUTO: 0 % — SIGNIFICANT CHANGE UP (ref 0–6)
GAS PNL BLDA: SIGNIFICANT CHANGE UP
GLUCOSE BLDC GLUCOMTR-MCNC: 362 MG/DL — HIGH (ref 70–99)
GLUCOSE BLDC GLUCOMTR-MCNC: 365 MG/DL — HIGH (ref 70–99)
GLUCOSE BLDC GLUCOMTR-MCNC: 389 MG/DL — HIGH (ref 70–99)
GLUCOSE BLDC GLUCOMTR-MCNC: 389 MG/DL — HIGH (ref 70–99)
GLUCOSE BLDC GLUCOMTR-MCNC: 408 MG/DL — HIGH (ref 70–99)
GLUCOSE BLDC GLUCOMTR-MCNC: 421 MG/DL — HIGH (ref 70–99)
GLUCOSE SERPL-MCNC: 419 MG/DL — HIGH (ref 70–99)
GRAM STN FLD: SIGNIFICANT CHANGE UP
HCT VFR BLD CALC: 33.1 % — LOW (ref 34.5–45)
HCT VFR BLD CALC: 33.9 % — LOW (ref 34.5–45)
HGB BLD-MCNC: 10 G/DL — LOW (ref 11.5–15.5)
HGB BLD-MCNC: 9.7 G/DL — LOW (ref 11.5–15.5)
IMM GRANULOCYTES NFR BLD AUTO: 0.5 % — SIGNIFICANT CHANGE UP (ref 0–1.5)
LYMPHOCYTES # BLD AUTO: 0.56 K/UL — LOW (ref 1–3.3)
LYMPHOCYTES # BLD AUTO: 5.1 % — LOW (ref 13–44)
MAGNESIUM SERPL-MCNC: 2.7 MG/DL — HIGH (ref 1.6–2.6)
MCHC RBC-ENTMCNC: 20.8 PG — LOW (ref 27–34)
MCHC RBC-ENTMCNC: 21.1 PG — LOW (ref 27–34)
MCHC RBC-ENTMCNC: 29.3 GM/DL — LOW (ref 32–36)
MCHC RBC-ENTMCNC: 29.5 GM/DL — LOW (ref 32–36)
MCV RBC AUTO: 70.6 FL — LOW (ref 80–100)
MCV RBC AUTO: 72 FL — LOW (ref 80–100)
MONOCYTES # BLD AUTO: 0.26 K/UL — SIGNIFICANT CHANGE UP (ref 0–0.9)
MONOCYTES NFR BLD AUTO: 2.4 % — SIGNIFICANT CHANGE UP (ref 2–14)
MRSA PCR RESULT.: DETECTED
NEUTROPHILS # BLD AUTO: 10.1 K/UL — HIGH (ref 1.8–7.4)
NEUTROPHILS NFR BLD AUTO: 91.9 % — HIGH (ref 43–77)
NRBC # BLD: 0 /100 WBCS — SIGNIFICANT CHANGE UP (ref 0–0)
NRBC # BLD: 0 /100 WBCS — SIGNIFICANT CHANGE UP (ref 0–0)
PHOSPHATE SERPL-MCNC: 4.3 MG/DL — SIGNIFICANT CHANGE UP (ref 2.5–4.5)
PLATELET # BLD AUTO: 330 K/UL — SIGNIFICANT CHANGE UP (ref 150–400)
PLATELET # BLD AUTO: 335 K/UL — SIGNIFICANT CHANGE UP (ref 150–400)
POTASSIUM SERPL-MCNC: 4.3 MMOL/L — SIGNIFICANT CHANGE UP (ref 3.5–5.3)
POTASSIUM SERPL-SCNC: 4.3 MMOL/L — SIGNIFICANT CHANGE UP (ref 3.5–5.3)
PROT SERPL-MCNC: 6.6 G/DL — SIGNIFICANT CHANGE UP (ref 6–8.3)
RBC # BLD: 4.6 M/UL — SIGNIFICANT CHANGE UP (ref 3.8–5.2)
RBC # BLD: 4.8 M/UL — SIGNIFICANT CHANGE UP (ref 3.8–5.2)
RBC # FLD: 16.2 % — HIGH (ref 10.3–14.5)
RBC # FLD: 16.3 % — HIGH (ref 10.3–14.5)
S AUREUS DNA NOSE QL NAA+PROBE: DETECTED
SODIUM SERPL-SCNC: 136 MMOL/L — SIGNIFICANT CHANGE UP (ref 135–145)
SPECIMEN SOURCE: SIGNIFICANT CHANGE UP
SPECIMEN SOURCE: SIGNIFICANT CHANGE UP
WBC # BLD: 10.99 K/UL — HIGH (ref 3.8–10.5)
WBC # BLD: 13.23 K/UL — HIGH (ref 3.8–10.5)
WBC # FLD AUTO: 10.99 K/UL — HIGH (ref 3.8–10.5)
WBC # FLD AUTO: 13.23 K/UL — HIGH (ref 3.8–10.5)

## 2022-07-03 PROCEDURE — 99291 CRITICAL CARE FIRST HOUR: CPT

## 2022-07-03 PROCEDURE — 73720 MRI LWR EXTREMITY W/O&W/DYE: CPT | Mod: 26,RT

## 2022-07-03 PROCEDURE — 70450 CT HEAD/BRAIN W/O DYE: CPT | Mod: 26

## 2022-07-03 RX ORDER — VANCOMYCIN HCL 1 G
1000 VIAL (EA) INTRAVENOUS EVERY 12 HOURS
Refills: 0 | Status: DISCONTINUED | OUTPATIENT
Start: 2022-07-03 | End: 2022-07-06

## 2022-07-03 RX ORDER — HUMAN INSULIN 100 [IU]/ML
12 INJECTION, SUSPENSION SUBCUTANEOUS EVERY 6 HOURS
Refills: 0 | Status: DISCONTINUED | OUTPATIENT
Start: 2022-07-03 | End: 2022-07-04

## 2022-07-03 RX ORDER — SODIUM CHLORIDE 9 MG/ML
1000 INJECTION, SOLUTION INTRAVENOUS
Refills: 0 | Status: DISCONTINUED | OUTPATIENT
Start: 2022-07-03 | End: 2022-07-11

## 2022-07-03 RX ORDER — INSULIN LISPRO 100/ML
2 VIAL (ML) SUBCUTANEOUS ONCE
Refills: 0 | Status: COMPLETED | OUTPATIENT
Start: 2022-07-03 | End: 2022-07-03

## 2022-07-03 RX ORDER — INSULIN LISPRO 100/ML
VIAL (ML) SUBCUTANEOUS
Refills: 0 | Status: DISCONTINUED | OUTPATIENT
Start: 2022-07-03 | End: 2022-07-03

## 2022-07-03 RX ORDER — DEXTROSE 50 % IN WATER 50 %
25 SYRINGE (ML) INTRAVENOUS ONCE
Refills: 0 | Status: DISCONTINUED | OUTPATIENT
Start: 2022-07-03 | End: 2022-07-11

## 2022-07-03 RX ORDER — INSULIN LISPRO 100/ML
VIAL (ML) SUBCUTANEOUS EVERY 6 HOURS
Refills: 0 | Status: DISCONTINUED | OUTPATIENT
Start: 2022-07-03 | End: 2022-07-05

## 2022-07-03 RX ORDER — GLUCAGON INJECTION, SOLUTION 0.5 MG/.1ML
1 INJECTION, SOLUTION SUBCUTANEOUS ONCE
Refills: 0 | Status: DISCONTINUED | OUTPATIENT
Start: 2022-07-03 | End: 2022-07-11

## 2022-07-03 RX ORDER — VANCOMYCIN HCL 1 G
1000 VIAL (EA) INTRAVENOUS EVERY 12 HOURS
Refills: 0 | Status: DISCONTINUED | OUTPATIENT
Start: 2022-07-03 | End: 2022-07-03

## 2022-07-03 RX ORDER — INSULIN LISPRO 100/ML
VIAL (ML) SUBCUTANEOUS AT BEDTIME
Refills: 0 | Status: DISCONTINUED | OUTPATIENT
Start: 2022-07-03 | End: 2022-07-03

## 2022-07-03 RX ORDER — DEXTROSE 50 % IN WATER 50 %
15 SYRINGE (ML) INTRAVENOUS ONCE
Refills: 0 | Status: DISCONTINUED | OUTPATIENT
Start: 2022-07-03 | End: 2022-07-11

## 2022-07-03 RX ORDER — IPRATROPIUM/ALBUTEROL SULFATE 18-103MCG
3 AEROSOL WITH ADAPTER (GRAM) INHALATION EVERY 6 HOURS
Refills: 0 | Status: DISCONTINUED | OUTPATIENT
Start: 2022-07-03 | End: 2022-07-11

## 2022-07-03 RX ORDER — PROPOFOL 10 MG/ML
15.25 INJECTION, EMULSION INTRAVENOUS
Qty: 500 | Refills: 0 | Status: DISCONTINUED | OUTPATIENT
Start: 2022-07-03 | End: 2022-07-04

## 2022-07-03 RX ORDER — FENTANYL CITRATE 50 UG/ML
25 INJECTION INTRAVENOUS
Refills: 0 | Status: DISCONTINUED | OUTPATIENT
Start: 2022-07-03 | End: 2022-07-04

## 2022-07-03 RX ORDER — DEXTROSE 50 % IN WATER 50 %
12.5 SYRINGE (ML) INTRAVENOUS ONCE
Refills: 0 | Status: DISCONTINUED | OUTPATIENT
Start: 2022-07-03 | End: 2022-07-11

## 2022-07-03 RX ORDER — HUMAN INSULIN 100 [IU]/ML
5 INJECTION, SUSPENSION SUBCUTANEOUS EVERY 6 HOURS
Refills: 0 | Status: DISCONTINUED | OUTPATIENT
Start: 2022-07-03 | End: 2022-07-03

## 2022-07-03 RX ADMIN — HUMAN INSULIN 12 UNIT(S): 100 INJECTION, SUSPENSION SUBCUTANEOUS at 14:38

## 2022-07-03 RX ADMIN — DEXMEDETOMIDINE HYDROCHLORIDE IN 0.9% SODIUM CHLORIDE 3.18 MICROGRAM(S)/KG/HR: 4 INJECTION INTRAVENOUS at 04:57

## 2022-07-03 RX ADMIN — FENTANYL CITRATE 1.59 MICROGRAM(S)/KG/HR: 50 INJECTION INTRAVENOUS at 07:23

## 2022-07-03 RX ADMIN — Medication 10: at 12:01

## 2022-07-03 RX ADMIN — MEROPENEM 100 MILLIGRAM(S): 1 INJECTION INTRAVENOUS at 05:07

## 2022-07-03 RX ADMIN — HUMAN INSULIN 12 UNIT(S): 100 INJECTION, SUSPENSION SUBCUTANEOUS at 19:07

## 2022-07-03 RX ADMIN — Medication 20 MILLIGRAM(S): at 13:34

## 2022-07-03 RX ADMIN — Medication 5.86 MICROGRAM(S)/KG/MIN: at 07:23

## 2022-07-03 RX ADMIN — Medication 3 MILLILITER(S): at 17:18

## 2022-07-03 RX ADMIN — Medication 3 MILLILITER(S): at 11:43

## 2022-07-03 RX ADMIN — Medication 20 MILLIGRAM(S): at 05:08

## 2022-07-03 RX ADMIN — CHLORHEXIDINE GLUCONATE 15 MILLILITER(S): 213 SOLUTION TOPICAL at 18:31

## 2022-07-03 RX ADMIN — Medication 10: at 18:28

## 2022-07-03 RX ADMIN — DEXMEDETOMIDINE HYDROCHLORIDE IN 0.9% SODIUM CHLORIDE 3.18 MICROGRAM(S)/KG/HR: 4 INJECTION INTRAVENOUS at 07:23

## 2022-07-03 RX ADMIN — CHLORHEXIDINE GLUCONATE 15 MILLILITER(S): 213 SOLUTION TOPICAL at 05:07

## 2022-07-03 RX ADMIN — FAMOTIDINE 20 MILLIGRAM(S): 10 INJECTION INTRAVENOUS at 12:01

## 2022-07-03 RX ADMIN — Medication 5: at 06:07

## 2022-07-03 RX ADMIN — HUMAN INSULIN 5 UNIT(S): 100 INJECTION, SUSPENSION SUBCUTANEOUS at 05:53

## 2022-07-03 RX ADMIN — HEPARIN SODIUM 1400 UNIT(S)/HR: 5000 INJECTION INTRAVENOUS; SUBCUTANEOUS at 18:27

## 2022-07-03 RX ADMIN — PROPOFOL 5.72 MICROGRAM(S)/KG/MIN: 10 INJECTION, EMULSION INTRAVENOUS at 07:23

## 2022-07-03 RX ADMIN — Medication 250 MILLIGRAM(S): at 19:08

## 2022-07-03 RX ADMIN — HEPARIN SODIUM 1400 UNIT(S)/HR: 5000 INJECTION INTRAVENOUS; SUBCUTANEOUS at 11:07

## 2022-07-03 RX ADMIN — MEROPENEM 100 MILLIGRAM(S): 1 INJECTION INTRAVENOUS at 13:25

## 2022-07-03 RX ADMIN — Medication 2 UNIT(S): at 09:56

## 2022-07-03 NOTE — PROGRESS NOTE ADULT - SUBJECTIVE AND OBJECTIVE BOX
Patient is a 71y old  Female who presents with a chief complaint of Anaphylaxis (2022 18:26)       INTERVAL HPI/OVERNIGHT EVENTS:  Patient seen and evaluated at bedside.  Pt is resting comfortable in NAD. Denies N/V/F/C.    Allergies    aspirin (Short breath)  Avelox (Short breath; Pruritus)  cefepime (Anaphylaxis)  codeine (Short breath)  Dilaudid (Short breath)  iodine (Short breath; Swelling)  penicillin (Unknown)  shellfish (Anaphylaxis)  tetanus toxoid (Short breath)  Valium (Short breath)    Intolerances        Vital Signs Last 24 Hrs  T(C): 36.4 (2022 12:00), Max: 38.2 (2022 15:00)  T(F): 97.5 (2022 12:00), Max: 100.7 (2022 15:00)  HR: 62 (2022 12:45) (57 - 150)  BP: 98/50 (2022 12:45) (79/58 - 168/83)  BP(mean): 70 (2022 12:45) (62 - 95)  RR: 18 (2022 12:45) (12 - 25)  SpO2: 100% (2022 12:45) (60% - 100%)    LABS:                        10.0   10.99 )-----------( 335      ( 2022 09:38 )             33.9     07-03    136  |  102  |  24<H>  ----------------------------<  419<H>  4.3   |  21<L>  |  0.62    Ca    8.8      2022 09:38  Phos  4.3     07-03  Mg     2.7     07-03    TPro  6.6  /  Alb  3.1<L>  /  TBili  0.2  /  DBili  x   /  AST  18  /  ALT  18  /  AlkPhos  94  07-03    PT/INR - ( 2022 21:15 )   PT: 15.4 sec;   INR: 1.34 ratio         PTT - ( 2022 10:26 )  PTT:31.6 sec  Urinalysis Basic - ( 2022 15:44 )    Color: Yellow / Appearance: Clear / S.024 / pH: x  Gluc: x / Ketone: Large  / Bili: Small / Urobili: 2 mg/dL   Blood: x / Protein: 30 mg/dL / Nitrite: Negative   Leuk Esterase: Negative / RBC: 1 /hpf / WBC 1 /HPF   Sq Epi: x / Non Sq Epi: 1 /hpf / Bacteria: Negative      CAPILLARY BLOOD GLUCOSE      POCT Blood Glucose.: 362 mg/dL (2022 11:54)  POCT Blood Glucose.: 408 mg/dL (2022 06:03)  POCT Blood Glucose.: 421 mg/dL (2022 05:17)      Lower Extremity Physical Exam:  Vascular: DP/PT 2/4 B/L, CFT <3 seconds B/L, Temperature gradient warm to cool B/L  Neuro: Epicritic sensation intact to the level of midfoot B/L  Musculoskeletal/Ortho: unremarkable   Skin: right foot medial navicular wound to bone w resolving erythema periwound, no malodor, no bogginess, no drainage noted, no tracking noted    RADIOLOGY & ADDITIONAL TESTS:

## 2022-07-03 NOTE — PROGRESS NOTE ADULT - ASSESSMENT
70yo F w/ right medial navicular wound  - pt seen and evaluated  - Afebrile, WBC 10.1  - right foot medial navicular wound to bone w resolving erythema periwound, no malodor, no bogginess, no drainage noted, no tracking noted today  - R foot wound culture pending  - R foot MRI pending  - continue IV antibiotics  - pod plan likely local wound care pending MR results, no surgical intervention likely at this admission  - discussed w/ attending

## 2022-07-03 NOTE — PROGRESS NOTE ADULT - ASSESSMENT
Patient is a 71 year old female with PMH DM, COPD (chronic asthma), history of Chronic Adrenal Insufficiency on Chronic prednisone history of colorectal cancer s/p resection (colostomy bag), Hx of CAD, Chronic A fib on Eliquis, and tracheomalacia and multiple intubations presenting with right foot wound concerning for OM s/p wound care. Course complicated by medication induced anaphylaxis 2/2 cefepime in setting of penicillin allergy. Further complicated by V-tach with pulse requiring lidocaine for . Now intubated and sedated for hypoxia and airway protection.     NEURO:  #Mental status: baseline non altered A&O3. Concern for confusion at home per daughter.  -Currently sedated on propofol & precedex & fentanyl    CV:  #Arrythmia:   - Vtach status post epinephrine IM x 2  - most likely 2/2 sympathomimetic surge  - EKG with ST/T wave changes concerning for ischemia  - continue to telemetric monitoring    #AFib:   - Hx of Afib on Eliquis at home.   - Most recent Echo with EF 67% Moderate-severe aortic regurgitation and mild tricuspid regurgitation. No visible evidence of LV dysfunction  - Rate control: On Mexiletine  - heparin gtt for AC    PULM:  #Anaphylaxis  - Presented with facial swelling, Hypoxia, lower extremity rash s/p cefepime  - Likely 2/2 cefepime administration in setting of penicillin allergy. Possibly 2/2 to Vanc? as patient also had erythematous lower extremities  - S/P Epi x 2  - Currently intubated for airway protection  - Skin checks and monitor for airway edema prior to intubation    #COPD/Asthama  - S/P Mag+ in ED  - hold home spiriva, fluticasone, symbicort  - Duonebs q6h    RENAL:  - Creatinine WNL   - I's and O's UOP:  - Amezcua catether    GI:  #Colorectal cancer S/P resection with colostomy  - Monitor ostomy output  - Ostomy care    #Diet: TFs  #Bowel regimen: On Senna, Miralax and Lactulose at home    ENDO:  #DM2: HbA1c 2022 8%  - On Lantus 10 units at bed time and 4 units TID with meals  - Finger sticks BG Q6hrs  - Will continue pregabalin diabetic neuropathy   - NPH 12U  - moderate SSI    #Adrenal insufficiency  - On Chronic steroid therapy at home Methylprednisolone 8mg QD  - S/P Stress dose steroids in the ED Solu-Cortef 100mg IV x 1  - continue methylprednisolone 8mg q8h    METABOLIC:  - No addressable issues    HEMATOLOGIC:  #CBC results with baseline Anemia Hb 10.1  - Transfuse Hb < 7 and PLT <50 if bleeding, <20 if fever, < 10 at risk for spontaneous bleed  #DVT prophylaxis with heparin gtt    ID:  #Sepsis  - Most likely 2/2 lower extremity foot wound concerning for OM  - Leucocytosis with hypotension and tachycardia in setting of probably foci of infection  - F/U wound cultures  - Maintain 2 large bore IVs  - F/U BCx  - UA negative  - continue meropenem & vanc    #Foot Wound  - Right foot wound in the setting of poorly controlled diabetes  - Podiatry on board for wound care  - F/U MRI of right foot to r/o Osteomyelitis      SKIN:   - Pod plan likely local wound care pending MR results  - F/U Wound culture

## 2022-07-03 NOTE — PROGRESS NOTE ADULT - ATTENDING COMMENTS
1. Acute respiratory failure due to anaphylaxis to cefepime.  Continue current AC vent settings. No air leak when cuff deflated. Also with h/o tracheomalacia. Continue steroids. H/O also of COPD vs Asthma. Peak pressures< 30. Continue bronchodilators.    2. Anaphylactic shock . Continue solumedrol 60mg daily. Allergic to cefepime. Will need allergy consult as outpatient. Allergic to multiple antibiotics.    3. ID R ankle wound. Debrided. ? osteo. Cx pending. Continue Vancomycin and Meropenem. MRI of ankle and foot.    4. Cardiac. Chronic atrial fib on Eliquis. Eliquis on hold. Heparin Drip started. Ventricular rate controlled.                   Episode of VTACH yesterday after epinephrine. Given Lidocaine. No recurrence .    5. H/o colon cancer with ostomy. Ostomy functioning well.    6. Adrenal insufficiency. On chronic steroids.  Continue solumedrol 60 mg daily.

## 2022-07-03 NOTE — PROGRESS NOTE ADULT - SUBJECTIVE AND OBJECTIVE BOX
INTERVAL HPI/OVERNIGHT EVENTS:    SUBJECTIVE: Patient seen and examined at bedside.       VITAL SIGNS:  ICU Vital Signs Last 24 Hrs  T(C): 36.4 (2022 12:00), Max: 38.2 (2022 15:00)  T(F): 97.5 (2022 12:00), Max: 100.7 (2022 15:00)  HR: 60 (2022 13:45) (57 - 150)  BP: 95/50 (2022 13:45) (76/41 - 168/83)  BP(mean): 70 (2022 13:45) (54 - 95)  ABP: --  ABP(mean): --  RR: 18 (2022 13:45) (12 - 25)  SpO2: 95% (2022 13:45) (60% - 100%)    Mode: AC/ CMV (Assist Control/ Continuous Mandatory Ventilation), RR (machine): 18, TV (machine): 450, FiO2: 21, PEEP: 5, ITime: 0.72, MAP: 9, PIP: 23  Plateau pressure:   P/F ratio:      @ 07:  -   @ 07:00  --------------------------------------------------------  IN: 1022.5 mL / OUT: 386 mL / NET: 636.5 mL     @ 07:  -   @ 13:55  --------------------------------------------------------  IN: 275.4 mL / OUT: 235 mL / NET: 40.4 mL      CAPILLARY BLOOD GLUCOSE      POCT Blood Glucose.: 362 mg/dL (2022 11:54)    ECG:    PHYSICAL EXAM:    General: NAD, appears well  HEENT: NC/AT, EOMI, PERRL, clear conjunctiva, no scleral icterus  Neck: supple  Respiratory: CTAB, no crackles/rales/rhonchi  Cardiovascular: +S1/S2, RRR, no murmurs/gallops/rubs  Abdomen: soft, non-tender, non-distended, bowel sounds present  Extremities: warm, no peripheral edema bilaterally  Neurological: A&Ox3, no focal deficits    MEDICATIONS:  MEDICATIONS  (STANDING):  albuterol/ipratropium for Nebulization 3 milliLiter(s) Nebulizer every 6 hours  chlorhexidine 0.12% Liquid 15 milliLiter(s) Oral Mucosa every 12 hours  dexMEDEtomidine Infusion 0.2 MICROgram(s)/kG/Hr (3.18 mL/Hr) IV Continuous <Continuous>  dextrose 5%. 1000 milliLiter(s) (100 mL/Hr) IV Continuous <Continuous>  dextrose 5%. 1000 milliLiter(s) (50 mL/Hr) IV Continuous <Continuous>  dextrose 50% Injectable 25 Gram(s) IV Push once  dextrose 50% Injectable 12.5 Gram(s) IV Push once  dextrose 50% Injectable 25 Gram(s) IV Push once  famotidine Injectable 20 milliGRAM(s) IV Push daily  fentaNYL   Infusion.. 0.5 MICROgram(s)/kG/Hr (1.59 mL/Hr) IV Continuous <Continuous>  glucagon  Injectable 1 milliGRAM(s) IntraMuscular once  heparin  Infusion.  Unit(s)/Hr (11 mL/Hr) IV Continuous <Continuous>  insulin lispro (ADMELOG) corrective regimen sliding scale   SubCutaneous every 6 hours  insulin NPH human recombinant 5 Unit(s) SubCutaneous every 6 hours  meropenem  IVPB 1000 milliGRAM(s) IV Intermittent every 8 hours  methylPREDNISolone sodium succinate Injectable 20 milliGRAM(s) IV Push every 8 hours  norepinephrine Infusion 0.05 MICROgram(s)/kG/Min (5.86 mL/Hr) IV Continuous <Continuous>  propofol Infusion 15.253 MICROgram(s)/kG/Min (5.72 mL/Hr) IV Continuous <Continuous>  vancomycin  IVPB 1000 milliGRAM(s) IV Intermittent every 12 hours    MEDICATIONS  (PRN):  dextrose Oral Gel 15 Gram(s) Oral once PRN Blood Glucose LESS THAN 70 milliGRAM(s)/deciliter      ALLERGIES:  Allergies    aspirin (Short breath)  Avelox (Short breath; Pruritus)  cefepime (Anaphylaxis)  codeine (Short breath)  Dilaudid (Short breath)  iodine (Short breath; Swelling)  penicillin (Unknown)  shellfish (Anaphylaxis)  tetanus toxoid (Short breath)  Valium (Short breath)    Intolerances        LABS:                        10.0   10.99 )-----------( 335      ( 2022 09:38 )             33.9     07-03    136  |  102  |  24<H>  ----------------------------<  419<H>  4.3   |  21<L>  |  0.62    Ca    8.8      2022 09:38  Phos  4.3     07-  Mg     2.7     07-    TPro  6.6  /  Alb  3.1<L>  /  TBili  0.2  /  DBili  x   /  AST  18  /  ALT  18  /  AlkPhos  94  07-03    PT/INR - ( 2022 21:15 )   PT: 15.4 sec;   INR: 1.34 ratio         PTT - ( 2022 10:26 )  PTT:31.6 sec  Urinalysis Basic - ( 2022 15:44 )    Color: Yellow / Appearance: Clear / S.024 / pH: x  Gluc: x / Ketone: Large  / Bili: Small / Urobili: 2 mg/dL   Blood: x / Protein: 30 mg/dL / Nitrite: Negative   Leuk Esterase: Negative / RBC: 1 /hpf / WBC 1 /HPF   Sq Epi: x / Non Sq Epi: 1 /hpf / Bacteria: Negative        RADIOLOGY & ADDITIONAL TESTS: Reviewed. INTERVAL HPI/OVERNIGHT EVENTS:     SUBJECTIVE: Patient seen and examined at bedside.       VITAL SIGNS:  ICU Vital Signs Last 24 Hrs  T(C): 36.4 (2022 12:00), Max: 38.2 (2022 15:00)  T(F): 97.5 (2022 12:00), Max: 100.7 (2022 15:00)  HR: 60 (2022 13:45) (57 - 150)  BP: 95/50 (2022 13:45) (76/41 - 168/83)  BP(mean): 70 (2022 13:45) (54 - 95)  ABP: --  ABP(mean): --  RR: 18 (2022 13:45) (12 - 25)  SpO2: 95% (2022 13:45) (60% - 100%)    Mode: AC/ CMV (Assist Control/ Continuous Mandatory Ventilation), RR (machine): 18, TV (machine): 450, FiO2: 21, PEEP: 5, ITime: 0.72, MAP: 9, PIP: 23  Plateau pressure:   P/F ratio:      @ 07:  -   @ 07:00  --------------------------------------------------------  IN: 1022.5 mL / OUT: 386 mL / NET: 636.5 mL     @ 07:  -   @ 13:55  --------------------------------------------------------  IN: 275.4 mL / OUT: 235 mL / NET: 40.4 mL      CAPILLARY BLOOD GLUCOSE      POCT Blood Glucose.: 362 mg/dL (2022 11:54)    ECG:    PHYSICAL EXAM:    General: NAD, appears well  HEENT: NC/AT, EOMI, PERRL, clear conjunctiva, no scleral icterus  Neck: supple  Respiratory: CTAB, no crackles/rales/rhonchi  Cardiovascular: +S1/S2, RRR, no murmurs/gallops/rubs  Abdomen: soft, non-tender, non-distended, bowel sounds present  Extremities: warm, no peripheral edema bilaterally  Neurological: A&Ox3, no focal deficits    MEDICATIONS:  MEDICATIONS  (STANDING):  albuterol/ipratropium for Nebulization 3 milliLiter(s) Nebulizer every 6 hours  chlorhexidine 0.12% Liquid 15 milliLiter(s) Oral Mucosa every 12 hours  dexMEDEtomidine Infusion 0.2 MICROgram(s)/kG/Hr (3.18 mL/Hr) IV Continuous <Continuous>  dextrose 5%. 1000 milliLiter(s) (100 mL/Hr) IV Continuous <Continuous>  dextrose 5%. 1000 milliLiter(s) (50 mL/Hr) IV Continuous <Continuous>  dextrose 50% Injectable 25 Gram(s) IV Push once  dextrose 50% Injectable 12.5 Gram(s) IV Push once  dextrose 50% Injectable 25 Gram(s) IV Push once  famotidine Injectable 20 milliGRAM(s) IV Push daily  fentaNYL   Infusion.. 0.5 MICROgram(s)/kG/Hr (1.59 mL/Hr) IV Continuous <Continuous>  glucagon  Injectable 1 milliGRAM(s) IntraMuscular once  heparin  Infusion.  Unit(s)/Hr (11 mL/Hr) IV Continuous <Continuous>  insulin lispro (ADMELOG) corrective regimen sliding scale   SubCutaneous every 6 hours  insulin NPH human recombinant 5 Unit(s) SubCutaneous every 6 hours  meropenem  IVPB 1000 milliGRAM(s) IV Intermittent every 8 hours  methylPREDNISolone sodium succinate Injectable 20 milliGRAM(s) IV Push every 8 hours  norepinephrine Infusion 0.05 MICROgram(s)/kG/Min (5.86 mL/Hr) IV Continuous <Continuous>  propofol Infusion 15.253 MICROgram(s)/kG/Min (5.72 mL/Hr) IV Continuous <Continuous>  vancomycin  IVPB 1000 milliGRAM(s) IV Intermittent every 12 hours    MEDICATIONS  (PRN):  dextrose Oral Gel 15 Gram(s) Oral once PRN Blood Glucose LESS THAN 70 milliGRAM(s)/deciliter      ALLERGIES:  Allergies    aspirin (Short breath)  Avelox (Short breath; Pruritus)  cefepime (Anaphylaxis)  codeine (Short breath)  Dilaudid (Short breath)  iodine (Short breath; Swelling)  penicillin (Unknown)  shellfish (Anaphylaxis)  tetanus toxoid (Short breath)  Valium (Short breath)    Intolerances        LABS:                        10.0   10.99 )-----------( 335      ( 2022 09:38 )             33.9     07-03    136  |  102  |  24<H>  ----------------------------<  419<H>  4.3   |  21<L>  |  0.62    Ca    8.8      2022 09:38  Phos  4.3     07-  Mg     2.7     07-    TPro  6.6  /  Alb  3.1<L>  /  TBili  0.2  /  DBili  x   /  AST  18  /  ALT  18  /  AlkPhos  94  07-03    PT/INR - ( 2022 21:15 )   PT: 15.4 sec;   INR: 1.34 ratio         PTT - ( 2022 10:26 )  PTT:31.6 sec  Urinalysis Basic - ( 2022 15:44 )    Color: Yellow / Appearance: Clear / S.024 / pH: x  Gluc: x / Ketone: Large  / Bili: Small / Urobili: 2 mg/dL   Blood: x / Protein: 30 mg/dL / Nitrite: Negative   Leuk Esterase: Negative / RBC: 1 /hpf / WBC 1 /HPF   Sq Epi: x / Non Sq Epi: 1 /hpf / Bacteria: Negative        RADIOLOGY & ADDITIONAL TESTS: Reviewed. INTERVAL HPI/OVERNIGHT EVENTS: No acute events overnight. Remains intubated & sedated on prop & precedex & fentanyl. On meropenem, starting vanc this AM. C/f change in neuro exam (L eye fixed, blown/lower extremities no withdrawal to pain). CTH done, neg for acute ICH.     SUBJECTIVE: Patient seen and examined at bedside. Appears comfortable. Intubated & sedated, unable to obtain further hx.       VITAL SIGNS:  ICU Vital Signs Last 24 Hrs  T(C): 36.4 (2022 12:00), Max: 38.2 (2022 15:00)  T(F): 97.5 (2022 12:00), Max: 100.7 (2022 15:00)  HR: 60 (2022 13:45) (57 - 150)  BP: 95/50 (2022 13:45) (76/41 - 168/83)  BP(mean): 70 (2022 13:45) (54 - 95)  ABP: --  ABP(mean): --  RR: 18 (2022 13:45) (12 - 25)  SpO2: 95% (2022 13:45) (60% - 100%)    Mode: AC/ CMV (Assist Control/ Continuous Mandatory Ventilation), RR (machine): 18, TV (machine): 450, FiO2: 21, PEEP: 5, ITime: 0.72, MAP: 9, PIP: 23  Plateau pressure:   P/F ratio:      @ : @ 07:00  --------------------------------------------------------  IN: 1022.5 mL / OUT: 386 mL / NET: 636.5 mL     @ :  -   @ 13:55  --------------------------------------------------------  IN: 275.4 mL / OUT: 235 mL / NET: 40.4 mL      CAPILLARY BLOOD GLUCOSE      POCT Blood Glucose.: 362 mg/dL (2022 11:54)    ECG:    PHYSICAL EXAM:    General: NAD, intubated & sedated  HEENT: NC/AT, L eye fixed& unresponsive to light, no scleral icterus  Neck: supple  Respiratory: clear to auscultation in anterio lung fields, mechanical breath sounds  Cardiovascular: +S1/S2, RRR, no murmurs/gallops/rubs  Abdomen: soft, non-distended, bowel sounds present  Extremities: warm, no peripheral edema bilaterally  Neurological: A&Ox0    MEDICATIONS:  MEDICATIONS  (STANDING):  albuterol/ipratropium for Nebulization 3 milliLiter(s) Nebulizer every 6 hours  chlorhexidine 0.12% Liquid 15 milliLiter(s) Oral Mucosa every 12 hours  dexMEDEtomidine Infusion 0.2 MICROgram(s)/kG/Hr (3.18 mL/Hr) IV Continuous <Continuous>  dextrose 5%. 1000 milliLiter(s) (100 mL/Hr) IV Continuous <Continuous>  dextrose 5%. 1000 milliLiter(s) (50 mL/Hr) IV Continuous <Continuous>  dextrose 50% Injectable 25 Gram(s) IV Push once  dextrose 50% Injectable 12.5 Gram(s) IV Push once  dextrose 50% Injectable 25 Gram(s) IV Push once  famotidine Injectable 20 milliGRAM(s) IV Push daily  fentaNYL   Infusion.. 0.5 MICROgram(s)/kG/Hr (1.59 mL/Hr) IV Continuous <Continuous>  glucagon  Injectable 1 milliGRAM(s) IntraMuscular once  heparin  Infusion.  Unit(s)/Hr (11 mL/Hr) IV Continuous <Continuous>  insulin lispro (ADMELOG) corrective regimen sliding scale   SubCutaneous every 6 hours  insulin NPH human recombinant 5 Unit(s) SubCutaneous every 6 hours  meropenem  IVPB 1000 milliGRAM(s) IV Intermittent every 8 hours  methylPREDNISolone sodium succinate Injectable 20 milliGRAM(s) IV Push every 8 hours  norepinephrine Infusion 0.05 MICROgram(s)/kG/Min (5.86 mL/Hr) IV Continuous <Continuous>  propofol Infusion 15.253 MICROgram(s)/kG/Min (5.72 mL/Hr) IV Continuous <Continuous>  vancomycin  IVPB 1000 milliGRAM(s) IV Intermittent every 12 hours    MEDICATIONS  (PRN):  dextrose Oral Gel 15 Gram(s) Oral once PRN Blood Glucose LESS THAN 70 milliGRAM(s)/deciliter      ALLERGIES:  Allergies    aspirin (Short breath)  Avelox (Short breath; Pruritus)  cefepime (Anaphylaxis)  codeine (Short breath)  Dilaudid (Short breath)  iodine (Short breath; Swelling)  penicillin (Unknown)  shellfish (Anaphylaxis)  tetanus toxoid (Short breath)  Valium (Short breath)    Intolerances        LABS:                        10.0   10.99 )-----------( 335      ( 2022 09:38 )             33.9     07-03    136  |  102  |  24<H>  ----------------------------<  419<H>  4.3   |  21<L>  |  0.62    Ca    8.8      2022 09:38  Phos  4.3     07-03  Mg     2.7     07-03    TPro  6.6  /  Alb  3.1<L>  /  TBili  0.2  /  DBili  x   /  AST  18  /  ALT  18  /  AlkPhos  94  07-03    PT/INR - ( 2022 21:15 )   PT: 15.4 sec;   INR: 1.34 ratio         PTT - ( 2022 10:26 )  PTT:31.6 sec  Urinalysis Basic - ( 2022 15:44 )    Color: Yellow / Appearance: Clear / S.024 / pH: x  Gluc: x / Ketone: Large  / Bili: Small / Urobili: 2 mg/dL   Blood: x / Protein: 30 mg/dL / Nitrite: Negative   Leuk Esterase: Negative / RBC: 1 /hpf / WBC 1 /HPF   Sq Epi: x / Non Sq Epi: 1 /hpf / Bacteria: Negative        RADIOLOGY & ADDITIONAL TESTS: Reviewed.

## 2022-07-04 LAB
-  AMIKACIN: SIGNIFICANT CHANGE UP
-  AMOXICILLIN/CLAVULANIC ACID: SIGNIFICANT CHANGE UP
-  AMPICILLIN/SULBACTAM: SIGNIFICANT CHANGE UP
-  AMPICILLIN/SULBACTAM: SIGNIFICANT CHANGE UP
-  AMPICILLIN: SIGNIFICANT CHANGE UP
-  AZTREONAM: SIGNIFICANT CHANGE UP
-  CEFAZOLIN: SIGNIFICANT CHANGE UP
-  CEFAZOLIN: SIGNIFICANT CHANGE UP
-  CEFEPIME: SIGNIFICANT CHANGE UP
-  CEFTRIAXONE: SIGNIFICANT CHANGE UP
-  CIPROFLOXACIN: SIGNIFICANT CHANGE UP
-  CLINDAMYCIN: SIGNIFICANT CHANGE UP
-  DAPTOMYCIN: SIGNIFICANT CHANGE UP
-  ERTAPENEM: SIGNIFICANT CHANGE UP
-  ERYTHROMYCIN: SIGNIFICANT CHANGE UP
-  GENTAMICIN: SIGNIFICANT CHANGE UP
-  GENTAMICIN: SIGNIFICANT CHANGE UP
-  LEVOFLOXACIN: SIGNIFICANT CHANGE UP
-  LINEZOLID: SIGNIFICANT CHANGE UP
-  MEROPENEM: SIGNIFICANT CHANGE UP
-  OXACILLIN: SIGNIFICANT CHANGE UP
-  PENICILLIN: SIGNIFICANT CHANGE UP
-  PIPERACILLIN/TAZOBACTAM: SIGNIFICANT CHANGE UP
-  RIFAMPIN: SIGNIFICANT CHANGE UP
-  TETRACYCLINE: SIGNIFICANT CHANGE UP
-  TOBRAMYCIN: SIGNIFICANT CHANGE UP
-  TRIMETHOPRIM/SULFAMETHOXAZOLE: SIGNIFICANT CHANGE UP
-  TRIMETHOPRIM/SULFAMETHOXAZOLE: SIGNIFICANT CHANGE UP
-  VANCOMYCIN: SIGNIFICANT CHANGE UP
ALBUMIN SERPL ELPH-MCNC: 2.8 G/DL — LOW (ref 3.3–5)
ALBUMIN SERPL ELPH-MCNC: 3 G/DL — LOW (ref 3.3–5)
ALP SERPL-CCNC: 88 U/L — SIGNIFICANT CHANGE UP (ref 40–120)
ALP SERPL-CCNC: 90 U/L — SIGNIFICANT CHANGE UP (ref 40–120)
ALT FLD-CCNC: 17 U/L — SIGNIFICANT CHANGE UP (ref 10–45)
ALT FLD-CCNC: 24 U/L — SIGNIFICANT CHANGE UP (ref 10–45)
ANION GAP SERPL CALC-SCNC: 12 MMOL/L — SIGNIFICANT CHANGE UP (ref 5–17)
ANION GAP SERPL CALC-SCNC: 14 MMOL/L — SIGNIFICANT CHANGE UP (ref 5–17)
APTT BLD: 85.9 SEC — HIGH (ref 27.5–35.5)
AST SERPL-CCNC: 20 U/L — SIGNIFICANT CHANGE UP (ref 10–40)
AST SERPL-CCNC: 31 U/L — SIGNIFICANT CHANGE UP (ref 10–40)
BILIRUB SERPL-MCNC: 0.2 MG/DL — SIGNIFICANT CHANGE UP (ref 0.2–1.2)
BILIRUB SERPL-MCNC: 0.2 MG/DL — SIGNIFICANT CHANGE UP (ref 0.2–1.2)
BUN SERPL-MCNC: 29 MG/DL — HIGH (ref 7–23)
BUN SERPL-MCNC: 33 MG/DL — HIGH (ref 7–23)
CALCIUM SERPL-MCNC: 8.8 MG/DL — SIGNIFICANT CHANGE UP (ref 8.4–10.5)
CALCIUM SERPL-MCNC: 8.9 MG/DL — SIGNIFICANT CHANGE UP (ref 8.4–10.5)
CHLORIDE SERPL-SCNC: 102 MMOL/L — SIGNIFICANT CHANGE UP (ref 96–108)
CHLORIDE SERPL-SCNC: 102 MMOL/L — SIGNIFICANT CHANGE UP (ref 96–108)
CO2 SERPL-SCNC: 19 MMOL/L — LOW (ref 22–31)
CO2 SERPL-SCNC: 24 MMOL/L — SIGNIFICANT CHANGE UP (ref 22–31)
CREAT SERPL-MCNC: 0.49 MG/DL — LOW (ref 0.5–1.3)
CREAT SERPL-MCNC: 0.64 MG/DL — SIGNIFICANT CHANGE UP (ref 0.5–1.3)
EGFR: 93 ML/MIN/1.73M2 — SIGNIFICANT CHANGE UP
EGFR: 99 ML/MIN/1.73M2 — SIGNIFICANT CHANGE UP
GAS PNL BLDA: SIGNIFICANT CHANGE UP
GLUCOSE BLDC GLUCOMTR-MCNC: 163 MG/DL — HIGH (ref 70–99)
GLUCOSE BLDC GLUCOMTR-MCNC: 168 MG/DL — HIGH (ref 70–99)
GLUCOSE BLDC GLUCOMTR-MCNC: 210 MG/DL — HIGH (ref 70–99)
GLUCOSE BLDC GLUCOMTR-MCNC: 311 MG/DL — HIGH (ref 70–99)
GLUCOSE BLDC GLUCOMTR-MCNC: 348 MG/DL — HIGH (ref 70–99)
GLUCOSE SERPL-MCNC: 347 MG/DL — HIGH (ref 70–99)
GLUCOSE SERPL-MCNC: 372 MG/DL — HIGH (ref 70–99)
HCT VFR BLD CALC: 31.6 % — LOW (ref 34.5–45)
HCT VFR BLD CALC: 33.1 % — LOW (ref 34.5–45)
HGB BLD-MCNC: 9.6 G/DL — LOW (ref 11.5–15.5)
HGB BLD-MCNC: 9.8 G/DL — LOW (ref 11.5–15.5)
INR BLD: 1.18 RATIO — HIGH (ref 0.88–1.16)
MAGNESIUM SERPL-MCNC: 2.5 MG/DL — SIGNIFICANT CHANGE UP (ref 1.6–2.6)
MAGNESIUM SERPL-MCNC: 2.5 MG/DL — SIGNIFICANT CHANGE UP (ref 1.6–2.6)
MCHC RBC-ENTMCNC: 20.9 PG — LOW (ref 27–34)
MCHC RBC-ENTMCNC: 21.3 PG — LOW (ref 27–34)
MCHC RBC-ENTMCNC: 29.6 GM/DL — LOW (ref 32–36)
MCHC RBC-ENTMCNC: 30.4 GM/DL — LOW (ref 32–36)
MCV RBC AUTO: 68.8 FL — LOW (ref 80–100)
MCV RBC AUTO: 71.8 FL — LOW (ref 80–100)
METHOD TYPE: SIGNIFICANT CHANGE UP
METHOD TYPE: SIGNIFICANT CHANGE UP
NRBC # BLD: 0 /100 WBCS — SIGNIFICANT CHANGE UP (ref 0–0)
NRBC # BLD: 0 /100 WBCS — SIGNIFICANT CHANGE UP (ref 0–0)
PHOSPHATE SERPL-MCNC: 2.3 MG/DL — LOW (ref 2.5–4.5)
PHOSPHATE SERPL-MCNC: 3.2 MG/DL — SIGNIFICANT CHANGE UP (ref 2.5–4.5)
PLATELET # BLD AUTO: 336 K/UL — SIGNIFICANT CHANGE UP (ref 150–400)
PLATELET # BLD AUTO: 352 K/UL — SIGNIFICANT CHANGE UP (ref 150–400)
POTASSIUM SERPL-MCNC: 4.2 MMOL/L — SIGNIFICANT CHANGE UP (ref 3.5–5.3)
POTASSIUM SERPL-MCNC: 4.5 MMOL/L — SIGNIFICANT CHANGE UP (ref 3.5–5.3)
POTASSIUM SERPL-SCNC: 4.2 MMOL/L — SIGNIFICANT CHANGE UP (ref 3.5–5.3)
POTASSIUM SERPL-SCNC: 4.5 MMOL/L — SIGNIFICANT CHANGE UP (ref 3.5–5.3)
PROT SERPL-MCNC: 6.3 G/DL — SIGNIFICANT CHANGE UP (ref 6–8.3)
PROT SERPL-MCNC: 6.5 G/DL — SIGNIFICANT CHANGE UP (ref 6–8.3)
PROTHROM AB SERPL-ACNC: 13.7 SEC — HIGH (ref 10.5–13.4)
RBC # BLD: 4.59 M/UL — SIGNIFICANT CHANGE UP (ref 3.8–5.2)
RBC # BLD: 4.61 M/UL — SIGNIFICANT CHANGE UP (ref 3.8–5.2)
RBC # FLD: 16.3 % — HIGH (ref 10.3–14.5)
RBC # FLD: 16.5 % — HIGH (ref 10.3–14.5)
SODIUM SERPL-SCNC: 135 MMOL/L — SIGNIFICANT CHANGE UP (ref 135–145)
SODIUM SERPL-SCNC: 138 MMOL/L — SIGNIFICANT CHANGE UP (ref 135–145)
WBC # BLD: 16.48 K/UL — HIGH (ref 3.8–10.5)
WBC # BLD: 17.18 K/UL — HIGH (ref 3.8–10.5)
WBC # FLD AUTO: 16.48 K/UL — HIGH (ref 3.8–10.5)
WBC # FLD AUTO: 17.18 K/UL — HIGH (ref 3.8–10.5)

## 2022-07-04 PROCEDURE — 99291 CRITICAL CARE FIRST HOUR: CPT

## 2022-07-04 RX ORDER — FENTANYL CITRATE 50 UG/ML
12.5 INJECTION INTRAVENOUS ONCE
Refills: 0 | Status: DISCONTINUED | OUTPATIENT
Start: 2022-07-04 | End: 2022-07-04

## 2022-07-04 RX ORDER — QUETIAPINE FUMARATE 200 MG/1
25 TABLET, FILM COATED ORAL
Refills: 0 | Status: DISCONTINUED | OUTPATIENT
Start: 2022-07-04 | End: 2022-07-05

## 2022-07-04 RX ORDER — HUMAN INSULIN 100 [IU]/ML
12 INJECTION, SUSPENSION SUBCUTANEOUS ONCE
Refills: 0 | Status: COMPLETED | OUTPATIENT
Start: 2022-07-04 | End: 2022-07-04

## 2022-07-04 RX ORDER — HUMAN INSULIN 100 [IU]/ML
22 INJECTION, SUSPENSION SUBCUTANEOUS EVERY 6 HOURS
Refills: 0 | Status: DISCONTINUED | OUTPATIENT
Start: 2022-07-04 | End: 2022-07-05

## 2022-07-04 RX ORDER — HALOPERIDOL DECANOATE 100 MG/ML
1 INJECTION INTRAMUSCULAR EVERY 6 HOURS
Refills: 0 | Status: DISCONTINUED | OUTPATIENT
Start: 2022-07-04 | End: 2022-07-07

## 2022-07-04 RX ORDER — HALOPERIDOL DECANOATE 100 MG/ML
1 INJECTION INTRAMUSCULAR ONCE
Refills: 0 | Status: COMPLETED | OUTPATIENT
Start: 2022-07-04 | End: 2022-07-04

## 2022-07-04 RX ORDER — HALOPERIDOL DECANOATE 100 MG/ML
5 INJECTION INTRAMUSCULAR ONCE
Refills: 0 | Status: COMPLETED | OUTPATIENT
Start: 2022-07-04 | End: 2022-07-04

## 2022-07-04 RX ORDER — ACETAMINOPHEN 500 MG
1000 TABLET ORAL ONCE
Refills: 0 | Status: COMPLETED | OUTPATIENT
Start: 2022-07-04 | End: 2022-07-04

## 2022-07-04 RX ORDER — DEXMEDETOMIDINE HYDROCHLORIDE IN 0.9% SODIUM CHLORIDE 4 UG/ML
0.5 INJECTION INTRAVENOUS
Qty: 200 | Refills: 0 | Status: DISCONTINUED | OUTPATIENT
Start: 2022-07-04 | End: 2022-07-04

## 2022-07-04 RX ORDER — POTASSIUM PHOSPHATE, MONOBASIC POTASSIUM PHOSPHATE, DIBASIC 236; 224 MG/ML; MG/ML
30 INJECTION, SOLUTION INTRAVENOUS ONCE
Refills: 0 | Status: COMPLETED | OUTPATIENT
Start: 2022-07-04 | End: 2022-07-04

## 2022-07-04 RX ORDER — MUPIROCIN 20 MG/G
1 OINTMENT TOPICAL
Refills: 0 | Status: COMPLETED | OUTPATIENT
Start: 2022-07-04 | End: 2022-07-09

## 2022-07-04 RX ADMIN — HUMAN INSULIN 22 UNIT(S): 100 INJECTION, SUSPENSION SUBCUTANEOUS at 23:17

## 2022-07-04 RX ADMIN — Medication 3 MILLILITER(S): at 11:01

## 2022-07-04 RX ADMIN — HUMAN INSULIN 12 UNIT(S): 100 INJECTION, SUSPENSION SUBCUTANEOUS at 17:54

## 2022-07-04 RX ADMIN — Medication 10: at 12:14

## 2022-07-04 RX ADMIN — Medication 3 MILLILITER(S): at 05:44

## 2022-07-04 RX ADMIN — FENTANYL CITRATE 12.5 MICROGRAM(S): 50 INJECTION INTRAVENOUS at 17:35

## 2022-07-04 RX ADMIN — Medication 3 MILLILITER(S): at 23:20

## 2022-07-04 RX ADMIN — HALOPERIDOL DECANOATE 5 MILLIGRAM(S): 100 INJECTION INTRAMUSCULAR at 16:27

## 2022-07-04 RX ADMIN — Medication 2: at 17:49

## 2022-07-04 RX ADMIN — Medication 1000 MILLIGRAM(S): at 14:00

## 2022-07-04 RX ADMIN — POTASSIUM PHOSPHATE, MONOBASIC POTASSIUM PHOSPHATE, DIBASIC 83.33 MILLIMOLE(S): 236; 224 INJECTION, SOLUTION INTRAVENOUS at 13:17

## 2022-07-04 RX ADMIN — HEPARIN SODIUM 1400 UNIT(S)/HR: 5000 INJECTION INTRAVENOUS; SUBCUTANEOUS at 01:42

## 2022-07-04 RX ADMIN — MEROPENEM 100 MILLIGRAM(S): 1 INJECTION INTRAVENOUS at 00:13

## 2022-07-04 RX ADMIN — HUMAN INSULIN 12 UNIT(S): 100 INJECTION, SUSPENSION SUBCUTANEOUS at 06:07

## 2022-07-04 RX ADMIN — FAMOTIDINE 20 MILLIGRAM(S): 10 INJECTION INTRAVENOUS at 12:55

## 2022-07-04 RX ADMIN — DEXMEDETOMIDINE HYDROCHLORIDE IN 0.9% SODIUM CHLORIDE 3.18 MICROGRAM(S)/KG/HR: 4 INJECTION INTRAVENOUS at 14:28

## 2022-07-04 RX ADMIN — FENTANYL CITRATE 12.5 MICROGRAM(S): 50 INJECTION INTRAVENOUS at 16:27

## 2022-07-04 RX ADMIN — Medication 10: at 01:21

## 2022-07-04 RX ADMIN — Medication 8: at 06:07

## 2022-07-04 RX ADMIN — Medication 20 MILLIGRAM(S): at 06:06

## 2022-07-04 RX ADMIN — Medication 400 MILLIGRAM(S): at 13:17

## 2022-07-04 RX ADMIN — CHLORHEXIDINE GLUCONATE 15 MILLILITER(S): 213 SOLUTION TOPICAL at 05:11

## 2022-07-04 RX ADMIN — MEROPENEM 100 MILLIGRAM(S): 1 INJECTION INTRAVENOUS at 23:12

## 2022-07-04 RX ADMIN — MEROPENEM 100 MILLIGRAM(S): 1 INJECTION INTRAVENOUS at 06:06

## 2022-07-04 RX ADMIN — HUMAN INSULIN 12 UNIT(S): 100 INJECTION, SUSPENSION SUBCUTANEOUS at 01:21

## 2022-07-04 RX ADMIN — MEROPENEM 100 MILLIGRAM(S): 1 INJECTION INTRAVENOUS at 14:29

## 2022-07-04 RX ADMIN — HUMAN INSULIN 12 UNIT(S): 100 INJECTION, SUSPENSION SUBCUTANEOUS at 12:04

## 2022-07-04 RX ADMIN — Medication 250 MILLIGRAM(S): at 17:49

## 2022-07-04 RX ADMIN — Medication 10 MILLIGRAM(S): at 23:13

## 2022-07-04 RX ADMIN — Medication 20 MILLIGRAM(S): at 00:13

## 2022-07-04 RX ADMIN — Medication 250 MILLIGRAM(S): at 05:11

## 2022-07-04 RX ADMIN — Medication 4: at 23:17

## 2022-07-04 RX ADMIN — Medication 3 MILLILITER(S): at 17:06

## 2022-07-04 RX ADMIN — HALOPERIDOL DECANOATE 1 MILLIGRAM(S): 100 INJECTION INTRAMUSCULAR at 14:40

## 2022-07-04 RX ADMIN — MUPIROCIN 1 APPLICATION(S): 20 OINTMENT TOPICAL at 17:49

## 2022-07-04 NOTE — PROGRESS NOTE ADULT - SUBJECTIVE AND OBJECTIVE BOX
INTERVAL HPI/OVERNIGHT EVENTS:    SUBJECTIVE: Patient seen and examined at bedside.       VITAL SIGNS:  ICU Vital Signs Last 24 Hrs  T(C): 36.2 (2022 04:00), Max: 36.4 (2022 08:00)  T(F): 97.1 (2022 04:00), Max: 97.6 (2022 20:00)  HR: 64 (2022 07:00) (54 - 80)  BP: 130/57 (2022 07:00) (76/41 - 165/50)  BP(mean): 82 (2022 07:00) (54 - 99)  ABP: --  ABP(mean): --  RR: 19 (2022 07:00) (16 - 20)  SpO2: 95% (2022 07:00) (91% - 100%)    Mode: AC/ CMV (Assist Control/ Continuous Mandatory Ventilation), RR (machine): 18, TV (machine): 450, FiO2: 21, PEEP: 5, ITime: 1, MAP: 10, PIP: 26  Plateau pressure:   P/F ratio:     -03 @ 07:01  -  -04 @ 07:00  --------------------------------------------------------  IN: 1703.6 mL / OUT: 555 mL / NET: 1148.6 mL      CAPILLARY BLOOD GLUCOSE      POCT Blood Glucose.: 311 mg/dL (2022 05:21)    ECG:    PHYSICAL EXAM:    General:   HEENT:   Neck:   Respiratory:   Cardiovascular:   Abdomen:   Extremities:  Neurological:    MEDICATIONS:  MEDICATIONS  (STANDING):  albuterol/ipratropium for Nebulization 3 milliLiter(s) Nebulizer every 6 hours  chlorhexidine 0.12% Liquid 15 milliLiter(s) Oral Mucosa every 12 hours  dexMEDEtomidine Infusion 0.2 MICROgram(s)/kG/Hr (3.18 mL/Hr) IV Continuous <Continuous>  dextrose 5%. 1000 milliLiter(s) (100 mL/Hr) IV Continuous <Continuous>  dextrose 5%. 1000 milliLiter(s) (50 mL/Hr) IV Continuous <Continuous>  dextrose 50% Injectable 25 Gram(s) IV Push once  dextrose 50% Injectable 12.5 Gram(s) IV Push once  dextrose 50% Injectable 25 Gram(s) IV Push once  famotidine Injectable 20 milliGRAM(s) IV Push daily  fentaNYL   Infusion.. 0.5 MICROgram(s)/kG/Hr (1.59 mL/Hr) IV Continuous <Continuous>  glucagon  Injectable 1 milliGRAM(s) IntraMuscular once  heparin  Infusion.  Unit(s)/Hr (11 mL/Hr) IV Continuous <Continuous>  insulin lispro (ADMELOG) corrective regimen sliding scale   SubCutaneous every 6 hours  insulin NPH human recombinant 12 Unit(s) SubCutaneous every 6 hours  meropenem  IVPB 1000 milliGRAM(s) IV Intermittent every 8 hours  methylPREDNISolone sodium succinate Injectable 20 milliGRAM(s) IV Push every 8 hours  norepinephrine Infusion 0.05 MICROgram(s)/kG/Min (5.86 mL/Hr) IV Continuous <Continuous>  propofol Infusion 15.253 MICROgram(s)/kG/Min (5.72 mL/Hr) IV Continuous <Continuous>  vancomycin  IVPB 1000 milliGRAM(s) IV Intermittent every 12 hours    MEDICATIONS  (PRN):  dextrose Oral Gel 15 Gram(s) Oral once PRN Blood Glucose LESS THAN 70 milliGRAM(s)/deciliter  fentaNYL    Injectable 25 MICROGram(s) IV Push every 15 minutes PRN Agitation      ALLERGIES:  Allergies    aspirin (Short breath)  Avelox (Short breath; Pruritus)  cefepime (Anaphylaxis)  codeine (Short breath)  Dilaudid (Short breath)  iodine (Short breath; Swelling)  penicillin (Unknown)  shellfish (Anaphylaxis)  tetanus toxoid (Short breath)  Valium (Short breath)    Intolerances        LABS:                        9.6    16.48 )-----------( 352      ( 2022 01:16 )             31.6     07-04    138  |  102  |  33<H>  ----------------------------<  372<H>  4.2   |  24  |  0.64    Ca    8.8      2022 01:16  Phos  3.2     07-04  Mg     2.5     07-04    TPro  6.3  /  Alb  3.0<L>  /  TBili  0.2  /  DBili  x   /  AST  20  /  ALT  17  /  AlkPhos  88  07-04    PT/INR - ( 2022 01:16 )   PT: 13.7 sec;   INR: 1.18 ratio         PTT - ( 2022 01:16 )  PTT:85.9 sec  Urinalysis Basic - ( 2022 15:44 )    Color: Yellow / Appearance: Clear / S.024 / pH: x  Gluc: x / Ketone: Large  / Bili: Small / Urobili: 2 mg/dL   Blood: x / Protein: 30 mg/dL / Nitrite: Negative   Leuk Esterase: Negative / RBC: 1 /hpf / WBC 1 /HPF   Sq Epi: x / Non Sq Epi: 1 /hpf / Bacteria: Negative        RADIOLOGY & ADDITIONAL TESTS: Reviewed.   INTERVAL HPI/OVERNIGHT EVENTS: No acute events overnight. Remains intubated & sedated on prop & precedex & fentanyl. On meropenem and vanc for osteomyelitis. Neuro exam (L eye fixed, blown/lower extremities no withdrawal to pain). CTH done, neg for acute ICH.      SUBJECTIVE: Patient seen and examined at bedside.     VITAL SIGNS:  ICU Vital Signs Last 24 Hrs  T(C): 36.2 (2022 04:00), Max: 36.4 (2022 08:00)  T(F): 97.1 (2022 04:00), Max: 97.6 (2022 20:00)  HR: 64 (2022 07:00) (54 - 80)  BP: 130/57 (2022 07:00) (76/41 - 165/50)  BP(mean): 82 (2022 07:00) (54 - 99)  ABP: --  ABP(mean): --  RR: 19 (2022 07:00) (16 - 20)  SpO2: 95% (2022 07:00) (91% - 100%)    Mode: AC/ CMV (Assist Control/ Continuous Mandatory Ventilation), RR (machine): 18, TV (machine): 450, FiO2: 21, PEEP: 5, ITime: 1, MAP: 10, PIP: 26  Plateau pressure:   P/F ratio:     07-03 @ 07:01  -  -04 @ 07:00  --------------------------------------------------------  IN: 1703.6 mL / OUT: 555 mL / NET: 1148.6 mL      CAPILLARY BLOOD GLUCOSE      POCT Blood Glucose.: 311 mg/dL (2022 05:21)    ECG:    PHYSICAL EXAM:    General:   HEENT:   Neck:   Respiratory:   Cardiovascular:   Abdomen:   Extremities:  Neurological:    MEDICATIONS:  MEDICATIONS  (STANDING):  albuterol/ipratropium for Nebulization 3 milliLiter(s) Nebulizer every 6 hours  chlorhexidine 0.12% Liquid 15 milliLiter(s) Oral Mucosa every 12 hours  dexMEDEtomidine Infusion 0.2 MICROgram(s)/kG/Hr (3.18 mL/Hr) IV Continuous <Continuous>  dextrose 5%. 1000 milliLiter(s) (100 mL/Hr) IV Continuous <Continuous>  dextrose 5%. 1000 milliLiter(s) (50 mL/Hr) IV Continuous <Continuous>  dextrose 50% Injectable 25 Gram(s) IV Push once  dextrose 50% Injectable 12.5 Gram(s) IV Push once  dextrose 50% Injectable 25 Gram(s) IV Push once  famotidine Injectable 20 milliGRAM(s) IV Push daily  fentaNYL   Infusion.. 0.5 MICROgram(s)/kG/Hr (1.59 mL/Hr) IV Continuous <Continuous>  glucagon  Injectable 1 milliGRAM(s) IntraMuscular once  heparin  Infusion.  Unit(s)/Hr (11 mL/Hr) IV Continuous <Continuous>  insulin lispro (ADMELOG) corrective regimen sliding scale   SubCutaneous every 6 hours  insulin NPH human recombinant 12 Unit(s) SubCutaneous every 6 hours  meropenem  IVPB 1000 milliGRAM(s) IV Intermittent every 8 hours  methylPREDNISolone sodium succinate Injectable 20 milliGRAM(s) IV Push every 8 hours  norepinephrine Infusion 0.05 MICROgram(s)/kG/Min (5.86 mL/Hr) IV Continuous <Continuous>  propofol Infusion 15.253 MICROgram(s)/kG/Min (5.72 mL/Hr) IV Continuous <Continuous>  vancomycin  IVPB 1000 milliGRAM(s) IV Intermittent every 12 hours    MEDICATIONS  (PRN):  dextrose Oral Gel 15 Gram(s) Oral once PRN Blood Glucose LESS THAN 70 milliGRAM(s)/deciliter  fentaNYL    Injectable 25 MICROGram(s) IV Push every 15 minutes PRN Agitation      ALLERGIES:  Allergies    aspirin (Short breath)  Avelox (Short breath; Pruritus)  cefepime (Anaphylaxis)  codeine (Short breath)  Dilaudid (Short breath)  iodine (Short breath; Swelling)  penicillin (Unknown)  shellfish (Anaphylaxis)  tetanus toxoid (Short breath)  Valium (Short breath)    Intolerances        LABS:                        9.6    16.48 )-----------( 352      ( 2022 01:16 )             31.6     07-04    138  |  102  |  33<H>  ----------------------------<  372<H>  4.2   |  24  |  0.64    Ca    8.8      2022 01:16  Phos  3.2     07-04  Mg     2.5     07-04    TPro  6.3  /  Alb  3.0<L>  /  TBili  0.2  /  DBili  x   /  AST  20  /  ALT  17  /  AlkPhos  88  07-04    PT/INR - ( 2022 01:16 )   PT: 13.7 sec;   INR: 1.18 ratio         PTT - ( 2022 01:16 )  PTT:85.9 sec  Urinalysis Basic - ( 2022 15:44 )    Color: Yellow / Appearance: Clear / S.024 / pH: x  Gluc: x / Ketone: Large  / Bili: Small / Urobili: 2 mg/dL   Blood: x / Protein: 30 mg/dL / Nitrite: Negative   Leuk Esterase: Negative / RBC: 1 /hpf / WBC 1 /HPF   Sq Epi: x / Non Sq Epi: 1 /hpf / Bacteria: Negative        RADIOLOGY & ADDITIONAL TESTS: Reviewed.   INTERVAL HPI/OVERNIGHT EVENTS: No acute events overnight. Remains intubated & sedated on prop & precedex & fentanyl. On meropenem and vanc for osteomyelitis. Neuro exam (L eye fixed, blown/lower extremities no withdrawal to pain, no gag reflex). CTH done, neg for acute ICH.      SUBJECTIVE: Patient seen and examined at bedside.  Appears comfortable. Intubated & sedated, unable to obtain further hx.       VITAL SIGNS:  ICU Vital Signs Last 24 Hrs  T(C): 36.2 (2022 04:00), Max: 36.4 (2022 08:00)  T(F): 97.1 (2022 04:00), Max: 97.6 (2022 20:00)  HR: 64 (2022 07:00) (54 - 80)  BP: 130/57 (2022 07:00) (76/41 - 165/50)  BP(mean): 82 (2022 07:00) (54 - 99)  ABP: --  ABP(mean): --  RR: 19 (2022 07:00) (16 - 20)  SpO2: 95% (2022 07:00) (91% - 100%)    Mode: AC/ CMV (Assist Control/ Continuous Mandatory Ventilation), RR (machine): 18, TV (machine): 450, FiO2: 21, PEEP: 5, ITime: 1, MAP: 10, PIP: 26  Plateau pressure:   P/F ratio:     07-03 @ 07:01  -  -04 @ 07:00  --------------------------------------------------------  IN: 1703.6 mL / OUT: 555 mL / NET: 1148.6 mL      CAPILLARY BLOOD GLUCOSE      POCT Blood Glucose.: 311 mg/dL (2022 05:21)    ECG:    PHYSICAL EXAM:    General:   HEENT:   Neck:   Respiratory:   Cardiovascular:   Abdomen:   Extremities:  Neurological:    MEDICATIONS:  MEDICATIONS  (STANDING):  albuterol/ipratropium for Nebulization 3 milliLiter(s) Nebulizer every 6 hours  chlorhexidine 0.12% Liquid 15 milliLiter(s) Oral Mucosa every 12 hours  dexMEDEtomidine Infusion 0.2 MICROgram(s)/kG/Hr (3.18 mL/Hr) IV Continuous <Continuous>  dextrose 5%. 1000 milliLiter(s) (100 mL/Hr) IV Continuous <Continuous>  dextrose 5%. 1000 milliLiter(s) (50 mL/Hr) IV Continuous <Continuous>  dextrose 50% Injectable 25 Gram(s) IV Push once  dextrose 50% Injectable 12.5 Gram(s) IV Push once  dextrose 50% Injectable 25 Gram(s) IV Push once  famotidine Injectable 20 milliGRAM(s) IV Push daily  fentaNYL   Infusion.. 0.5 MICROgram(s)/kG/Hr (1.59 mL/Hr) IV Continuous <Continuous>  glucagon  Injectable 1 milliGRAM(s) IntraMuscular once  heparin  Infusion.  Unit(s)/Hr (11 mL/Hr) IV Continuous <Continuous>  insulin lispro (ADMELOG) corrective regimen sliding scale   SubCutaneous every 6 hours  insulin NPH human recombinant 12 Unit(s) SubCutaneous every 6 hours  meropenem  IVPB 1000 milliGRAM(s) IV Intermittent every 8 hours  methylPREDNISolone sodium succinate Injectable 20 milliGRAM(s) IV Push every 8 hours  norepinephrine Infusion 0.05 MICROgram(s)/kG/Min (5.86 mL/Hr) IV Continuous <Continuous>  propofol Infusion 15.253 MICROgram(s)/kG/Min (5.72 mL/Hr) IV Continuous <Continuous>  vancomycin  IVPB 1000 milliGRAM(s) IV Intermittent every 12 hours    MEDICATIONS  (PRN):  dextrose Oral Gel 15 Gram(s) Oral once PRN Blood Glucose LESS THAN 70 milliGRAM(s)/deciliter  fentaNYL    Injectable 25 MICROGram(s) IV Push every 15 minutes PRN Agitation      ALLERGIES:  Allergies    aspirin (Short breath)  Avelox (Short breath; Pruritus)  cefepime (Anaphylaxis)  codeine (Short breath)  Dilaudid (Short breath)  iodine (Short breath; Swelling)  penicillin (Unknown)  shellfish (Anaphylaxis)  tetanus toxoid (Short breath)  Valium (Short breath)    Intolerances        LABS:                        9.6    16.48 )-----------( 352      ( 2022 01:16 )             31.6     07-04    138  |  102  |  33<H>  ----------------------------<  372<H>  4.2   |  24  |  0.64    Ca    8.8      2022 01:16  Phos  3.2     07-04  Mg     2.5     07-04    TPro  6.3  /  Alb  3.0<L>  /  TBili  0.2  /  DBili  x   /  AST  20  /  ALT  17  /  AlkPhos  88  07-04    PT/INR - ( 2022 01:16 )   PT: 13.7 sec;   INR: 1.18 ratio         PTT - ( 2022 01:16 )  PTT:85.9 sec  Urinalysis Basic - ( 2022 15:44 )    Color: Yellow / Appearance: Clear / S.024 / pH: x  Gluc: x / Ketone: Large  / Bili: Small / Urobili: 2 mg/dL   Blood: x / Protein: 30 mg/dL / Nitrite: Negative   Leuk Esterase: Negative / RBC: 1 /hpf / WBC 1 /HPF   Sq Epi: x / Non Sq Epi: 1 /hpf / Bacteria: Negative        RADIOLOGY & ADDITIONAL TESTS: Reviewed.   INTERVAL HPI/OVERNIGHT EVENTS: No acute events overnight. Remains intubated & sedated on prop & precedex & fentanyl. On meropenem and vanc for osteomyelitis. Neuro exam (L eye fixed, blown/lower extremities no withdrawal to pain, no gag reflex). CTH done, neg for acute ICH.    Pt was twitching so propofol was increased to 40. Pt on prop, precedex, fentanyl.    SUBJECTIVE: Patient seen and examined at bedside.  Appears comfortable. Intubated & sedated, unable to obtain further hx.       VITAL SIGNS:  ICU Vital Signs Last 24 Hrs  T(C): 36.2 (2022 04:00), Max: 36.4 (2022 08:00)  T(F): 97.1 (2022 04:00), Max: 97.6 (2022 20:00)  HR: 64 (2022 07:00) (54 - 80)  BP: 130/57 (2022 07:00) (76/41 - 165/50)  BP(mean): 82 (2022 07:00) (54 - 99)  ABP: --  ABP(mean): --  RR: 19 (2022 07:00) (16 - 20)  SpO2: 95% (2022 07:00) (91% - 100%)    Mode: AC/ CMV (Assist Control/ Continuous Mandatory Ventilation), RR (machine): 18, TV (machine): 450, FiO2: 21, PEEP: 5, ITime: 1, MAP: 10, PIP: 26  Plateau pressure:   P/F ratio:     07-03 @ 07:01  -  - @ 07:00  --------------------------------------------------------  IN: 1703.6 mL / OUT: 555 mL / NET: 1148.6 mL      CAPILLARY BLOOD GLUCOSE      POCT Blood Glucose.: 311 mg/dL (2022 05:21)    ECG:    PHYSICAL EXAM:    General:   HEENT:   Neck:   Respiratory:   Cardiovascular:   Abdomen:   Extremities:  Neurological:    MEDICATIONS:  MEDICATIONS  (STANDING):  albuterol/ipratropium for Nebulization 3 milliLiter(s) Nebulizer every 6 hours  chlorhexidine 0.12% Liquid 15 milliLiter(s) Oral Mucosa every 12 hours  dexMEDEtomidine Infusion 0.2 MICROgram(s)/kG/Hr (3.18 mL/Hr) IV Continuous <Continuous>  dextrose 5%. 1000 milliLiter(s) (100 mL/Hr) IV Continuous <Continuous>  dextrose 5%. 1000 milliLiter(s) (50 mL/Hr) IV Continuous <Continuous>  dextrose 50% Injectable 25 Gram(s) IV Push once  dextrose 50% Injectable 12.5 Gram(s) IV Push once  dextrose 50% Injectable 25 Gram(s) IV Push once  famotidine Injectable 20 milliGRAM(s) IV Push daily  fentaNYL   Infusion.. 0.5 MICROgram(s)/kG/Hr (1.59 mL/Hr) IV Continuous <Continuous>  glucagon  Injectable 1 milliGRAM(s) IntraMuscular once  heparin  Infusion.  Unit(s)/Hr (11 mL/Hr) IV Continuous <Continuous>  insulin lispro (ADMELOG) corrective regimen sliding scale   SubCutaneous every 6 hours  insulin NPH human recombinant 12 Unit(s) SubCutaneous every 6 hours  meropenem  IVPB 1000 milliGRAM(s) IV Intermittent every 8 hours  methylPREDNISolone sodium succinate Injectable 20 milliGRAM(s) IV Push every 8 hours  norepinephrine Infusion 0.05 MICROgram(s)/kG/Min (5.86 mL/Hr) IV Continuous <Continuous>  propofol Infusion 15.253 MICROgram(s)/kG/Min (5.72 mL/Hr) IV Continuous <Continuous>  vancomycin  IVPB 1000 milliGRAM(s) IV Intermittent every 12 hours    MEDICATIONS  (PRN):  dextrose Oral Gel 15 Gram(s) Oral once PRN Blood Glucose LESS THAN 70 milliGRAM(s)/deciliter  fentaNYL    Injectable 25 MICROGram(s) IV Push every 15 minutes PRN Agitation      ALLERGIES:  Allergies    aspirin (Short breath)  Avelox (Short breath; Pruritus)  cefepime (Anaphylaxis)  codeine (Short breath)  Dilaudid (Short breath)  iodine (Short breath; Swelling)  penicillin (Unknown)  shellfish (Anaphylaxis)  tetanus toxoid (Short breath)  Valium (Short breath)    Intolerances        LABS:                        9.6    16.48 )-----------( 352      ( 2022 01:16 )             31.6     07-04    138  |  102  |  33<H>  ----------------------------<  372<H>  4.2   |  24  |  0.64    Ca    8.8      2022 01:16  Phos  3.2     07-04  Mg     2.5     07-04    TPro  6.3  /  Alb  3.0<L>  /  TBili  0.2  /  DBili  x   /  AST  20  /  ALT  17  /  AlkPhos  88  07-04    PT/INR - ( 2022 01:16 )   PT: 13.7 sec;   INR: 1.18 ratio         PTT - ( 2022 01:16 )  PTT:85.9 sec  Urinalysis Basic - ( 2022 15:44 )    Color: Yellow / Appearance: Clear / S.024 / pH: x  Gluc: x / Ketone: Large  / Bili: Small / Urobili: 2 mg/dL   Blood: x / Protein: 30 mg/dL / Nitrite: Negative   Leuk Esterase: Negative / RBC: 1 /hpf / WBC 1 /HPF   Sq Epi: x / Non Sq Epi: 1 /hpf / Bacteria: Negative        RADIOLOGY & ADDITIONAL TESTS: Reviewed.   INTERVAL HPI/OVERNIGHT EVENTS: No acute events overnight. Remains intubated & sedated on prop & precedex & fentanyl. On meropenem and vanc for osteomyelitis. Neuro exam (L eye fixed, blown/lower extremities no withdrawal to pain, no gag reflex). CTH done, neg for acute ICH.    Went for MRI this morning. Pt was twitching so propofol was increased to 40. Pt on prop, precedex, fentanyl. Pt with fingersticks pre-meal in 300s. NPH to 15 vs. solumedrol?    SUBJECTIVE: Patient seen and examined at bedside. Appears comfortable. Intubated & sedated, unable to obtain further hx. Not withdrawing to pain in any extremity for me this morning. No gag reflex.       VITAL SIGNS:  ICU Vital Signs Last 24 Hrs  T(C): 36.2 (2022 04:00), Max: 36.4 (2022 08:00)  T(F): 97.1 (2022 04:00), Max: 97.6 (2022 20:00)  HR: 64 (2022 07:00) (54 - 80)  BP: 130/57 (2022 07:00) (76/41 - 165/50)  BP(mean): 82 (2022 07:00) (54 - 99)  ABP: --  ABP(mean): --  RR: 19 (2022 07:00) (16 - 20)  SpO2: 95% (2022 07:00) (91% - 100%)    Mode: AC/ CMV (Assist Control/ Continuous Mandatory Ventilation), RR (machine): 18, TV (machine): 450, FiO2: 21, PEEP: 5, ITime: 1, MAP: 10, PIP: 26  Plateau pressure:   P/F ratio:     07-03 @ 07:01  -  07-04 @ 07:00  --------------------------------------------------------  IN: 1703.6 mL / OUT: 555 mL / NET: 1148.6 mL      CAPILLARY BLOOD GLUCOSE      POCT Blood Glucose.: 311 mg/dL (2022 05:21)    ECG:    PHYSICAL EXAM:    General: NAD, intubated & sedated  HEENT: NC/AT, L eye fixed& unresponsive to light, no scleral icterus  Neck: supple  Respiratory: clear to auscultation in anterior lung fields, mechanical breath sounds  Cardiovascular: +S1/S2, RRR, no murmurs/gallops/rubs  Abdomen: soft, non-distended, bowel sounds present  Extremities: warm, no peripheral edema bilaterally  Neurological: A&Ox0    MEDICATIONS:  MEDICATIONS  (STANDING):  albuterol/ipratropium for Nebulization 3 milliLiter(s) Nebulizer every 6 hours  chlorhexidine 0.12% Liquid 15 milliLiter(s) Oral Mucosa every 12 hours  dexMEDEtomidine Infusion 0.2 MICROgram(s)/kG/Hr (3.18 mL/Hr) IV Continuous <Continuous>  dextrose 5%. 1000 milliLiter(s) (100 mL/Hr) IV Continuous <Continuous>  dextrose 5%. 1000 milliLiter(s) (50 mL/Hr) IV Continuous <Continuous>  dextrose 50% Injectable 25 Gram(s) IV Push once  dextrose 50% Injectable 12.5 Gram(s) IV Push once  dextrose 50% Injectable 25 Gram(s) IV Push once  famotidine Injectable 20 milliGRAM(s) IV Push daily  fentaNYL   Infusion.. 0.5 MICROgram(s)/kG/Hr (1.59 mL/Hr) IV Continuous <Continuous>  glucagon  Injectable 1 milliGRAM(s) IntraMuscular once  heparin  Infusion.  Unit(s)/Hr (11 mL/Hr) IV Continuous <Continuous>  insulin lispro (ADMELOG) corrective regimen sliding scale   SubCutaneous every 6 hours  insulin NPH human recombinant 12 Unit(s) SubCutaneous every 6 hours  meropenem  IVPB 1000 milliGRAM(s) IV Intermittent every 8 hours  methylPREDNISolone sodium succinate Injectable 20 milliGRAM(s) IV Push every 8 hours  norepinephrine Infusion 0.05 MICROgram(s)/kG/Min (5.86 mL/Hr) IV Continuous <Continuous>  propofol Infusion 15.253 MICROgram(s)/kG/Min (5.72 mL/Hr) IV Continuous <Continuous>  vancomycin  IVPB 1000 milliGRAM(s) IV Intermittent every 12 hours    MEDICATIONS  (PRN):  dextrose Oral Gel 15 Gram(s) Oral once PRN Blood Glucose LESS THAN 70 milliGRAM(s)/deciliter  fentaNYL    Injectable 25 MICROGram(s) IV Push every 15 minutes PRN Agitation      ALLERGIES:  Allergies    aspirin (Short breath)  Avelox (Short breath; Pruritus)  cefepime (Anaphylaxis)  codeine (Short breath)  Dilaudid (Short breath)  iodine (Short breath; Swelling)  penicillin (Unknown)  shellfish (Anaphylaxis)  tetanus toxoid (Short breath)  Valium (Short breath)    Intolerances        LABS:                        9.6    16.48 )-----------( 352      ( 2022 01:16 )             31.6     07-04    138  |  102  |  33<H>  ----------------------------<  372<H>  4.2   |  24  |  0.64    Ca    8.8      2022 01:16  Phos  3.2     07-04  Mg     2.5     07-04    TPro  6.3  /  Alb  3.0<L>  /  TBili  0.2  /  DBili  x   /  AST  20  /  ALT  17  /  AlkPhos  88  07-04    PT/INR - ( 2022 01:16 )   PT: 13.7 sec;   INR: 1.18 ratio         PTT - ( 2022 01:16 )  PTT:85.9 sec  Urinalysis Basic - ( 2022 15:44 )    Color: Yellow / Appearance: Clear / S.024 / pH: x  Gluc: x / Ketone: Large  / Bili: Small / Urobili: 2 mg/dL   Blood: x / Protein: 30 mg/dL / Nitrite: Negative   Leuk Esterase: Negative / RBC: 1 /hpf / WBC 1 /HPF   Sq Epi: x / Non Sq Epi: 1 /hpf / Bacteria: Negative        RADIOLOGY & ADDITIONAL TESTS: Reviewed.   INTERVAL HPI/OVERNIGHT EVENTS: No acute events overnight. Remains intubated & sedated on prop & precedex & fentanyl. On meropenem and vanc for osteomyelitis. Neuro exam (L eye fixed, blown/lower extremities no withdrawal to pain, no gag reflex). CTH done, neg for acute ICH.    Went for MRI this morning. Pt was twitching so propofol was increased to 40. Pt on prop, precedex, fentanyl. Pt with fingersticks pre-meal in 300s. Adjusted solumedrol to 10 mg IV.    SUBJECTIVE: Patient seen and examined at bedside. Appears comfortable. Intubated & sedated, unable to obtain further hx. Not withdrawing to pain in any extremity for me this morning. No gag reflex.       VITAL SIGNS:  ICU Vital Signs Last 24 Hrs  T(C): 36.2 (2022 04:00), Max: 36.4 (2022 08:00)  T(F): 97.1 (2022 04:00), Max: 97.6 (2022 20:00)  HR: 64 (2022 07:00) (54 - 80)  BP: 130/57 (2022 07:00) (76/41 - 165/50)  BP(mean): 82 (2022 07:00) (54 - 99)  ABP: --  ABP(mean): --  RR: 19 (2022 07:00) (16 - 20)  SpO2: 95% (2022 07:00) (91% - 100%)    Mode: AC/ CMV (Assist Control/ Continuous Mandatory Ventilation), RR (machine): 18, TV (machine): 450, FiO2: 21, PEEP: 5, ITime: 1, MAP: 10, PIP: 26  Plateau pressure:   P/F ratio:     07-03 @ 07:01  -  07-04 @ 07:00  --------------------------------------------------------  IN: 1703.6 mL / OUT: 555 mL / NET: 1148.6 mL      CAPILLARY BLOOD GLUCOSE      POCT Blood Glucose.: 311 mg/dL (2022 05:21)    ECG:    PHYSICAL EXAM:    General: NAD, intubated & sedated  HEENT: NC/AT, L eye fixed& unresponsive to light, no scleral icterus  Neck: supple  Respiratory: clear to auscultation in anterior lung fields, mechanical breath sounds  Cardiovascular: +S1/S2, RRR, no murmurs/gallops/rubs  Abdomen: soft, non-distended, bowel sounds present  Extremities: warm, no peripheral edema bilaterally  Neurological: A&Ox0    MEDICATIONS:  MEDICATIONS  (STANDING):  albuterol/ipratropium for Nebulization 3 milliLiter(s) Nebulizer every 6 hours  chlorhexidine 0.12% Liquid 15 milliLiter(s) Oral Mucosa every 12 hours  dexMEDEtomidine Infusion 0.2 MICROgram(s)/kG/Hr (3.18 mL/Hr) IV Continuous <Continuous>  dextrose 5%. 1000 milliLiter(s) (100 mL/Hr) IV Continuous <Continuous>  dextrose 5%. 1000 milliLiter(s) (50 mL/Hr) IV Continuous <Continuous>  dextrose 50% Injectable 25 Gram(s) IV Push once  dextrose 50% Injectable 12.5 Gram(s) IV Push once  dextrose 50% Injectable 25 Gram(s) IV Push once  famotidine Injectable 20 milliGRAM(s) IV Push daily  fentaNYL   Infusion.. 0.5 MICROgram(s)/kG/Hr (1.59 mL/Hr) IV Continuous <Continuous>  glucagon  Injectable 1 milliGRAM(s) IntraMuscular once  heparin  Infusion.  Unit(s)/Hr (11 mL/Hr) IV Continuous <Continuous>  insulin lispro (ADMELOG) corrective regimen sliding scale   SubCutaneous every 6 hours  insulin NPH human recombinant 12 Unit(s) SubCutaneous every 6 hours  meropenem  IVPB 1000 milliGRAM(s) IV Intermittent every 8 hours  methylPREDNISolone sodium succinate Injectable 20 milliGRAM(s) IV Push every 8 hours  norepinephrine Infusion 0.05 MICROgram(s)/kG/Min (5.86 mL/Hr) IV Continuous <Continuous>  propofol Infusion 15.253 MICROgram(s)/kG/Min (5.72 mL/Hr) IV Continuous <Continuous>  vancomycin  IVPB 1000 milliGRAM(s) IV Intermittent every 12 hours    MEDICATIONS  (PRN):  dextrose Oral Gel 15 Gram(s) Oral once PRN Blood Glucose LESS THAN 70 milliGRAM(s)/deciliter  fentaNYL    Injectable 25 MICROGram(s) IV Push every 15 minutes PRN Agitation      ALLERGIES:  Allergies    aspirin (Short breath)  Avelox (Short breath; Pruritus)  cefepime (Anaphylaxis)  codeine (Short breath)  Dilaudid (Short breath)  iodine (Short breath; Swelling)  penicillin (Unknown)  shellfish (Anaphylaxis)  tetanus toxoid (Short breath)  Valium (Short breath)    Intolerances        LABS:                        9.6    16.48 )-----------( 352      ( 2022 01:16 )             31.6     07-04    138  |  102  |  33<H>  ----------------------------<  372<H>  4.2   |  24  |  0.64    Ca    8.8      2022 01:16  Phos  3.2     07-04  Mg     2.5     07-04    TPro  6.3  /  Alb  3.0<L>  /  TBili  0.2  /  DBili  x   /  AST  20  /  ALT  17  /  AlkPhos  88  07-04    PT/INR - ( 2022 01:16 )   PT: 13.7 sec;   INR: 1.18 ratio         PTT - ( 2022 01:16 )  PTT:85.9 sec  Urinalysis Basic - ( 2022 15:44 )    Color: Yellow / Appearance: Clear / S.024 / pH: x  Gluc: x / Ketone: Large  / Bili: Small / Urobili: 2 mg/dL   Blood: x / Protein: 30 mg/dL / Nitrite: Negative   Leuk Esterase: Negative / RBC: 1 /hpf / WBC 1 /HPF   Sq Epi: x / Non Sq Epi: 1 /hpf / Bacteria: Negative        RADIOLOGY & ADDITIONAL TESTS: Reviewed.

## 2022-07-04 NOTE — PROGRESS NOTE ADULT - SUBJECTIVE AND OBJECTIVE BOX
Patient is a 71y old  Female who presents with a chief complaint of Anaphylaxis (2022 07:36)       INTERVAL HPI/OVERNIGHT EVENTS:  Patient seen and evaluated at bedside.  Pt is resting comfortable in NAD. Denies N/V/F/C.    Allergies    aspirin (Short breath)  Avelox (Short breath; Pruritus)  cefepime (Anaphylaxis)  codeine (Short breath)  Dilaudid (Short breath)  iodine (Short breath; Swelling)  penicillin (Unknown)  shellfish (Anaphylaxis)  tetanus toxoid (Short breath)  Valium (Short breath)    Intolerances        Vital Signs Last 24 Hrs  T(C): 36.8 (2022 08:00), Max: 36.8 (2022 08:00)  T(F): 98.2 (2022 08:00), Max: 98.2 (2022 08:00)  HR: 64 (2022 11:36) (54 - 80)  BP: 113/55 (2022 10:15) (76/41 - 165/50)  BP(mean): 79 (2022 10:15) (54 - 99)  RR: 18 (2022 10:15) (16 - 21)  SpO2: 100% (2022 11:36) (91% - 100%)    LABS:                        9.8    17.18 )-----------( 336      ( 2022 11:29 )             33.1     07-04    138  |  102  |  33<H>  ----------------------------<  372<H>  4.2   |  24  |  0.64    Ca    8.8      2022 01:16  Phos  3.2     07-04  Mg     2.5     07-04    TPro  6.3  /  Alb  3.0<L>  /  TBili  0.2  /  DBili  x   /  AST  20  /  ALT  17  /  AlkPhos  88  07-04    PT/INR - ( 2022 01:16 )   PT: 13.7 sec;   INR: 1.18 ratio         PTT - ( 2022 01:16 )  PTT:85.9 sec  Urinalysis Basic - ( 2022 15:44 )    Color: Yellow / Appearance: Clear / S.024 / pH: x  Gluc: x / Ketone: Large  / Bili: Small / Urobili: 2 mg/dL   Blood: x / Protein: 30 mg/dL / Nitrite: Negative   Leuk Esterase: Negative / RBC: 1 /hpf / WBC 1 /HPF   Sq Epi: x / Non Sq Epi: 1 /hpf / Bacteria: Negative      CAPILLARY BLOOD GLUCOSE      POCT Blood Glucose.: 348 mg/dL (2022 11:23)  POCT Blood Glucose.: 311 mg/dL (2022 05:21)  POCT Blood Glucose.: 389 mg/dL (2022 18:09)  POCT Blood Glucose.: 389 mg/dL (2022 18:08)  POCT Blood Glucose.: 365 mg/dL (2022 14:23)  POCT Blood Glucose.: 362 mg/dL (2022 11:54)      Lower Extremity Physical Exam:  Vascular: DP/PT 2/4 B/L, CFT <3 seconds B/L, Temperature gradient warm to cool B/L  Neuro: Epicritic sensation intact to the level of midfoot B/L  Musculoskeletal/Ortho: unremarkable   Skin: right foot medial navicular wound to bone w resolving erythema periwound, no malodor, no bogginess, no drainage noted, no tracking noted      RADIOLOGY & ADDITIONAL TESTS:

## 2022-07-04 NOTE — PROGRESS NOTE ADULT - ASSESSMENT
70yo F w/ right medial navicular wound  - pt seen and evaluated  - Afebrile, WBC 16  - right foot medial navicular wound to bone w resolving erythema periwound, no malodor, no bogginess, no drainage noted, no tracking noted today  - R foot wound culture proteus and staph aureus  - R foot MRI pending  - continue IV antibiotics  - pod plan likely local wound care pending MR results, no surgical intervention likely at this admission  - discussed w/ attending

## 2022-07-04 NOTE — PROGRESS NOTE ADULT - ASSESSMENT
Patient is a 71 year old female with PMH DM, COPD (chronic asthma), history of Chronic Adrenal Insufficiency on Chronic prednisone history of colorectal cancer s/p resection (colostomy bag), Hx of CAD, Chronic A fib on Eliquis, and tracheomalacia and multiple intubations presenting with right foot wound concerning for OM s/p wound care. Course complicated by medication induced anaphylaxis 2/2 cefepime in setting of penicillin allergy. Further complicated by V-tach with pulse requiring lidocaine for . Now intubated and sedated for hypoxia and airway protection.     NEURO:  #Mental status: baseline non altered A&O3. Concern for confusion at home per daughter.  -Currently sedated on propofol & precedex & fentanyl    CV:  #Arrythmia:   - Vtach status post epinephrine IM x 2  - most likely 2/2 sympathomimetic surge  - EKG with ST/T wave changes concerning for ischemia  - continue to telemetric monitoring    #AFib:   - Hx of Afib on Eliquis at home.   - Most recent Echo with EF 67% Moderate-severe aortic regurgitation and mild tricuspid regurgitation. No visible evidence of LV dysfunction  - Rate control: On Mexiletine  - heparin gtt for AC    PULM:  #Anaphylaxis  - Presented with facial swelling, Hypoxia, lower extremity rash s/p cefepime  - Likely 2/2 cefepime administration in setting of penicillin allergy. Possibly 2/2 to Vanc? as patient also had erythematous lower extremities  - S/P Epi x 2  - Currently intubated for airway protection  - Skin checks and monitor for airway edema prior to intubation    #COPD/Asthama  - S/P Mag+ in ED  - hold home spiriva, fluticasone, symbicort  - Duonebs q6h    RENAL:  - Creatinine WNL   - I's and O's UOP:  - Amezcua catether    GI:  #Colorectal cancer S/P resection with colostomy  - Monitor ostomy output  - Ostomy care    #Diet: TFs  #Bowel regimen: On Senna, Miralax and Lactulose at home    ENDO:  #DM2: HbA1c 2022 8%  - On Lantus 10 units at bed time and 4 units TID with meals  - Finger sticks BG Q6hrs  - Will continue pregabalin diabetic neuropathy   - NPH 12U  - moderate SSI    #Adrenal insufficiency  - On Chronic steroid therapy at home Methylprednisolone 8mg QD  - S/P Stress dose steroids in the ED Solu-Cortef 100mg IV x 1  - continue methylprednisolone 8mg q8h    METABOLIC:  - No addressable issues    HEMATOLOGIC:  #CBC results with baseline Anemia Hb 10.1  - Transfuse Hb < 7 and PLT <50 if bleeding, <20 if fever, < 10 at risk for spontaneous bleed  #DVT prophylaxis with heparin gtt    ID:  #Sepsis  - Most likely 2/2 lower extremity foot wound concerning for OM  - Leucocytosis with hypotension and tachycardia in setting of probably foci of infection  - F/U wound cultures  - Maintain 2 large bore IVs  - F/U BCx  - UA negative  - continue meropenem & vanc    #Foot Wound  - Right foot wound in the setting of poorly controlled diabetes  - Podiatry on board for wound care  - F/U MRI of right foot to r/o Osteomyelitis      SKIN:   - Pod plan likely local wound care pending MR results  - F/U Wound culture   Patient is a 71 year old female with PMH DM, COPD (chronic asthma), history of Chronic Adrenal Insufficiency on Chronic prednisone history of colorectal cancer s/p resection (colostomy bag), Hx of CAD, Chronic A fib on Eliquis, and tracheomalacia and multiple intubations presenting with right foot wound concerning for OM s/p wound care. Course complicated by medication induced anaphylaxis 2/2 cefepime in setting of penicillin allergy. Further complicated by V-tach with pulse requiring lidocaine for . Now intubated and sedated for hypoxia and airway protection.     NEURO:  #Mental status: baseline non altered A&O3. Concern for confusion at home per daughter.  -Currently sedated on propofol & precedex & fentanyl    CV:  #Arrythmia:   - Vtach status post epinephrine IM x 2  - most likely 2/2 sympathomimetic surge  - EKG with ST/T wave changes concerning for ischemia  - continue to telemetric monitoring    #AFib:   - Hx of Afib on Eliquis at home.   - Most recent Echo with EF 67% Moderate-severe aortic regurgitation and mild tricuspid regurgitation. No visible evidence of LV dysfunction  - Rate control: On Mexiletine  - heparin gtt for AC    PULM:  #Anaphylaxis  - Presented with facial swelling, Hypoxia, lower extremity rash s/p cefepime  - Likely 2/2 cefepime administration in setting of penicillin allergy. Possibly 2/2 to Vanc? as patient also had erythematous lower extremities  - S/P Epi x 2  - Currently intubated for airway protection  - Skin checks and monitor for airway edema prior to intubation    #COPD/Asthama  - S/P Mag+ in ED  - hold home spiriva, fluticasone, symbicort  - Duonebs q6h    RENAL:  - Creatinine WNL   - I's and O's UOP:  - Amezcua catether    GI:  #Colorectal cancer S/P resection with colostomy  - Monitor ostomy output  - Ostomy care    #Diet: TFs  #Bowel regimen: On Senna, Miralax and Lactulose at home    ENDO:  #DM2: HbA1c 2022 8%  - On Lantus 10 units at bed time and 4 units TID with meals  - Finger sticks BG Q6hrs  - Will continue pregabalin diabetic neuropathy   - NPH 12U  - moderate SSI    #Adrenal insufficiency  - On Chronic steroid therapy at home Methylprednisolone 8mg QD  - S/P Stress dose steroids in the ED Solu-Cortef 100mg IV x 1  - continue methylprednisolone 8mg q8h    METABOLIC:  - No addressable issues    HEMATOLOGIC:  #CBC results with baseline Anemia Hb 10.1  - Transfuse Hb < 7 and PLT <50 if bleeding, <20 if fever, < 10 at risk for spontaneous bleed  #DVT prophylaxis with heparin gtt    ID:  #Sepsis  - Most likely 2/2 lower extremity foot wound concerning for OM  - Leucocytosis with hypotension and tachycardia in setting of probably foci of infection  - F/U wound cultures  - Maintain 2 large bore IVs  - F/U BCx  - UA negative  - continue meropenem & vanc    #Foot Wound  - Right foot wound in the setting of poorly controlled diabetes  - Podiatry on board for wound care: R foot wound culture pending, R foot MRI pending, continue IV antibiotics  - F/U MRI of right foot to r/o Osteomyelitis      SKIN:   - Pod plan likely local wound care pending MR results  - F/U Wound culture   Patient is a 71 year old female with PMH DM, COPD (chronic asthma), history of Chronic Adrenal Insufficiency on Chronic prednisone history of colorectal cancer s/p resection (colostomy bag), Hx of CAD, Chronic A fib on Eliquis, and tracheomalacia and multiple intubations presenting with right foot wound concerning for OM s/p wound care. Course complicated by medication induced anaphylaxis 2/2 cefepime in setting of penicillin allergy. Further complicated by V-tach with pulse requiring lidocaine for . Now intubated and sedated for hypoxia and airway protection.     NEURO:  #Mental status: baseline non altered A&O3. Concern for confusion at home per daughter.  -Currently sedated on propofol & precedex & fentanyl    CV:  #Arrythmia:   - Vtach status post epinephrine IM x 2  - most likely 2/2 sympathomimetic surge  - EKG with ST/T wave changes concerning for ischemia  - continue to telemetric monitoring    #AFib:   - Hx of Afib on Eliquis at home.   - Most recent Echo with EF 67% Moderate-severe aortic regurgitation and mild tricuspid regurgitation. No visible evidence of LV dysfunction  - Rate control: On Mexiletine  - heparin gtt for AC    PULM:  #Anaphylaxis  - Presented with facial swelling, Hypoxia, lower extremity rash s/p cefepime  - Likely 2/2 cefepime administration in setting of penicillin allergy. Possibly 2/2 to Vanc? as patient also had erythematous lower extremities  - S/P Epi x 2  - Currently intubated for airway protection  - Skin checks and monitor for airway edema prior to intubation    #COPD/Asthama  - S/P Mag+ in ED  - hold home spiriva, fluticasone, symbicort  - Duonebs q6h    RENAL:  - Creatinine WNL   - I's and O's UOP:  - Amezcua catether    GI:  #Colorectal cancer S/P resection with colostomy  - Monitor ostomy output  - Ostomy care    #Diet: TFs  #Bowel regimen: On Senna, Miralax and Lactulose at home    ENDO:  #DM2: HbA1c 2022 8%  - On Lantus 10 units at bed time and 4 units TID with meals  - Finger sticks BG Q6hrs  - elevated finger sticks pre-meal - alter NPH vs. solumedrol?  - Will continue pregabalin diabetic neuropathy   - NPH 12U  - moderate SSI    #Adrenal insufficiency  - On Chronic steroid therapy at home Methylprednisolone 8mg QD  - S/P Stress dose steroids in the ED Solu-Cortef 100mg IV x 1  - continue methylprednisolone 8mg q8h    METABOLIC:  - No addressable issues    HEMATOLOGIC:  #CBC results with baseline Anemia Hb 10.1  - Transfuse Hb < 7 and PLT <50 if bleeding, <20 if fever, < 10 at risk for spontaneous bleed  #DVT prophylaxis with heparin gtt    ID:  #Sepsis  - Most likely 2/2 lower extremity foot wound concerning for OM  - Leucocytosis with hypotension and tachycardia in setting of probably foci of infection  - F/U wound cultures  - Maintain 2 large bore IVs  - F/U BCx  - UA negative  - continue meropenem & vanc    #Foot Wound  - Right foot wound in the setting of poorly controlled diabetes  - Podiatry on board for wound care: R foot wound culture pending, R foot MRI pending, continue IV antibiotics  - F/U MRI of right foot to r/o Osteomyelitis      SKIN:   - Pod plan likely local wound care pending MR results  - F/U Wound culture   Patient is a 71 year old female with PMH DM, COPD (chronic asthma), history of Chronic Adrenal Insufficiency on Chronic prednisone history of colorectal cancer s/p resection (colostomy bag), Hx of CAD, Chronic A fib on Eliquis, and tracheomalacia and multiple intubations presenting with right foot wound concerning for OM s/p wound care. Course complicated by medication induced anaphylaxis 2/2 cefepime in setting of penicillin allergy. Further complicated by V-tach with pulse requiring lidocaine for . Now intubated and sedated for hypoxia and airway protection.     NEURO:  #Mental status: baseline non altered A&O3. Concern for confusion at home per daughter.  -Weaned off sedation for extubation today    CV:  #Arrythmia:   - Vtach status post epinephrine IM x 2  - most likely 2/2 sympathomimetic surge  - EKG with ST/T wave changes concerning for ischemia  - continue to telemetric monitoring    #AFib:   - Hx of Afib on Eliquis at home.   - Most recent Echo with EF 67% Moderate-severe aortic regurgitation and mild tricuspid regurgitation. No visible evidence of LV dysfunction  - Rate control: not currently on Mexiletine  - heparin gtt for AC    PULM:  #Anaphylaxis  - Presented with facial swelling, Hypoxia, lower extremity rash s/p cefepime  - Likely 2/2 cefepime administration in setting of penicillin allergy. Possibly 2/2 to Vanc? as patient also had erythematous lower extremities  - S/P Epi x 2  - Currently intubated for airway protection  - Skin checks and monitor for airway edema prior to intubation  -pt with air leak; extubated today    #COPD/Asthama  - S/P Mag+ in ED  - hold home spiriva, fluticasone, symbicort  - Duonebs q6h    RENAL:  - Creatinine WNL   - I's and O's UOP:  - Amezcua catether    GI:  #Colorectal cancer S/P resection with colostomy  - Monitor ostomy output  - Ostomy care    #Diet: TFs  #Bowel regimen: On Senna, Miralax and Lactulose at home    ENDO:  #DM2: HbA1c 2022 8%  - On Lantus 10 units at bed time and 4 units TID with meals  - Finger sticks BG Q6hrs  - elevated finger sticks pre-meal - adjusted solumedrol  - Will continue pregabalin diabetic neuropathy   - NPH 12U  - moderate SSI    #Adrenal insufficiency  - On Chronic steroid therapy at home Methylprednisolone 8mg QD  - S/P Stress dose steroids in the ED Solu-Cortef 100mg IV x 1  - continue methylprednisolone 8mg q8h    METABOLIC:  - No addressable issues    HEMATOLOGIC:  #CBC results with baseline Anemia Hb 10.1  - Transfuse Hb < 7 and PLT <50 if bleeding, <20 if fever, < 10 at risk for spontaneous bleed  #DVT prophylaxis with heparin gtt    ID:  #Sepsis  - Most likely 2/2 lower extremity foot wound concerning for OM  - Leucocytosis with hypotension and tachycardia in setting of probably foci of infection  - F/U wound cultures  - Maintain 2 large bore IVs  - F/U BCx  - UA negative  - continue meropenem & vanc    #Foot Wound  - Right foot wound in the setting of poorly controlled diabetes  - Podiatry on board for wound care: R foot wound culture pending, R foot MRI pending, continue IV antibiotics  - F/U MRI of right foot to r/o Osteomyelitis      SKIN:   - Pod plan likely local wound care pending MR results  - F/U Wound culture

## 2022-07-04 NOTE — AIRWAY REMOVAL NOTE  ADULT & PEDS - O2 DELIVERY METHOD
Written order for extubation verified. The patient was identified by full name and birth date compared to the identification band. Present during the procedure was Dustin Holliday RRT, Chris Maddox RN

## 2022-07-04 NOTE — PROGRESS NOTE ADULT - ATTENDING COMMENTS
1. Acute respiratory failure due to anaphylaxis to cefepime. .  Positive air leak when cuff deflated. Also with h/o tracheomalacia. Continue steroids. H/O also of COPD vs Asthma. Peak pressures< 30. Continue bronchodilators. Tolerating PS wean . Trial of extubation.    2. Anaphylactic shock . Continue solumedrol 60mg daily. Allergic to cefepime. Will need allergy consult as outpatient. Allergic to multiple antibiotics.    3. ID R ankle wound. Debrided. ? osteo. Cx pending. Continue Vancomycin and Meropenem. MRI of ankle and foot. Preliminary results of wound culture show staph aureus and proteus  infection. Await sensitivities.    4. Cardiac. Chronic atrial fib on Eliquis. Eliquis on hold. Heparin Drip started. Ventricular rate controlled.                   Episode of VTACH yesterday after epinephrine. Given Lidocaine. No recurrence .    5. H/o colon cancer with ostomy. Ostomy functioning well.    6. Adrenal insufficiency. On chronic steroids.  Continue solumedrol 60 mg daily. 1. Acute respiratory failure due to anaphylaxis to cefepime.   H/o tracheomalacia. Continue steroids. H/O also of COPD vs Asthma. . Tolerating extubation.    2. Anaphylactic shock . No pressors . Decrease steroids to 10mg due to delirium. Continue solumedrol 60mg daily. Allergic to cefepime. Will need allergy consult as outpatient. Allergic to multiple antibiotics.    3. ID R ankle wound. Debrided. ? osteo. Cx pending. Continue Vancomycin and Meropenem. MRI of ankle and foot. Preliminary results of wound culture show MRSA and  ESBL proteus  infection.     4. Cardiac. Chronic atrial fib on Eliquis. Eliquis on hold. Heparin Drip started. Ventricular rate controlled.                   Episode of VTACH yesterday after epinephrine. Given Lidocaine. No recurrence .    5. H/o colon cancer with ostomy. Ostomy functioning well.    6. Adrenal insufficiency. Change  solumedrol to 10mg daily.  7. Delirium. Steroids decreased to 10mg daily.  Decrease Seroquel to 25 mg QHS.

## 2022-07-05 ENCOUNTER — TRANSCRIPTION ENCOUNTER (OUTPATIENT)
Age: 72
End: 2022-07-05

## 2022-07-05 LAB
-  AMIKACIN: SIGNIFICANT CHANGE UP
-  AMOXICILLIN/CLAVULANIC ACID: SIGNIFICANT CHANGE UP
-  AMPICILLIN/SULBACTAM: SIGNIFICANT CHANGE UP
-  AMPICILLIN: SIGNIFICANT CHANGE UP
-  AZTREONAM: SIGNIFICANT CHANGE UP
-  CEFAZOLIN: SIGNIFICANT CHANGE UP
-  CEFEPIME: SIGNIFICANT CHANGE UP
-  CEFTRIAXONE: SIGNIFICANT CHANGE UP
-  CIPROFLOXACIN: SIGNIFICANT CHANGE UP
-  ERTAPENEM: SIGNIFICANT CHANGE UP
-  GENTAMICIN: SIGNIFICANT CHANGE UP
-  LEVOFLOXACIN: SIGNIFICANT CHANGE UP
-  MEROPENEM: SIGNIFICANT CHANGE UP
-  PIPERACILLIN/TAZOBACTAM: SIGNIFICANT CHANGE UP
-  TOBRAMYCIN: SIGNIFICANT CHANGE UP
-  TRIMETHOPRIM/SULFAMETHOXAZOLE: SIGNIFICANT CHANGE UP
ALBUMIN SERPL ELPH-MCNC: 3.1 G/DL — LOW (ref 3.3–5)
ALBUMIN SERPL ELPH-MCNC: 3.3 G/DL — SIGNIFICANT CHANGE UP (ref 3.3–5)
ALP SERPL-CCNC: 84 U/L — SIGNIFICANT CHANGE UP (ref 40–120)
ALP SERPL-CCNC: 85 U/L — SIGNIFICANT CHANGE UP (ref 40–120)
ALT FLD-CCNC: 26 U/L — SIGNIFICANT CHANGE UP (ref 10–45)
ALT FLD-CCNC: 29 U/L — SIGNIFICANT CHANGE UP (ref 10–45)
ANION GAP SERPL CALC-SCNC: 10 MMOL/L — SIGNIFICANT CHANGE UP (ref 5–17)
ANION GAP SERPL CALC-SCNC: 13 MMOL/L — SIGNIFICANT CHANGE UP (ref 5–17)
APTT BLD: 192.4 SEC — CRITICAL HIGH (ref 27.5–35.5)
APTT BLD: 81 SEC — HIGH (ref 27.5–35.5)
APTT BLD: 90.8 SEC — HIGH (ref 27.5–35.5)
AST SERPL-CCNC: 23 U/L — SIGNIFICANT CHANGE UP (ref 10–40)
AST SERPL-CCNC: 33 U/L — SIGNIFICANT CHANGE UP (ref 10–40)
BASE EXCESS BLDV CALC-SCNC: 2.4 MMOL/L — HIGH (ref -2–2)
BASOPHILS # BLD AUTO: 0.01 K/UL — SIGNIFICANT CHANGE UP (ref 0–0.2)
BASOPHILS # BLD AUTO: 0.02 K/UL — SIGNIFICANT CHANGE UP (ref 0–0.2)
BASOPHILS NFR BLD AUTO: 0.1 % — SIGNIFICANT CHANGE UP (ref 0–2)
BASOPHILS NFR BLD AUTO: 0.1 % — SIGNIFICANT CHANGE UP (ref 0–2)
BILIRUB SERPL-MCNC: 0.1 MG/DL — LOW (ref 0.2–1.2)
BILIRUB SERPL-MCNC: 0.2 MG/DL — SIGNIFICANT CHANGE UP (ref 0.2–1.2)
BUN SERPL-MCNC: 16 MG/DL — SIGNIFICANT CHANGE UP (ref 7–23)
BUN SERPL-MCNC: 21 MG/DL — SIGNIFICANT CHANGE UP (ref 7–23)
CA-I SERPL-SCNC: 1.24 MMOL/L — SIGNIFICANT CHANGE UP (ref 1.15–1.33)
CALCIUM SERPL-MCNC: 8.7 MG/DL — SIGNIFICANT CHANGE UP (ref 8.4–10.5)
CALCIUM SERPL-MCNC: 8.8 MG/DL — SIGNIFICANT CHANGE UP (ref 8.4–10.5)
CHLORIDE BLDV-SCNC: 108 MMOL/L — SIGNIFICANT CHANGE UP (ref 96–108)
CHLORIDE SERPL-SCNC: 106 MMOL/L — SIGNIFICANT CHANGE UP (ref 96–108)
CHLORIDE SERPL-SCNC: 109 MMOL/L — HIGH (ref 96–108)
CO2 BLDV-SCNC: 30 MMOL/L — HIGH (ref 22–26)
CO2 SERPL-SCNC: 23 MMOL/L — SIGNIFICANT CHANGE UP (ref 22–31)
CO2 SERPL-SCNC: 26 MMOL/L — SIGNIFICANT CHANGE UP (ref 22–31)
CREAT SERPL-MCNC: 0.45 MG/DL — LOW (ref 0.5–1.3)
CREAT SERPL-MCNC: 0.5 MG/DL — SIGNIFICANT CHANGE UP (ref 0.5–1.3)
CULTURE RESULTS: SIGNIFICANT CHANGE UP
EGFR: 102 ML/MIN/1.73M2 — SIGNIFICANT CHANGE UP
EGFR: 99 ML/MIN/1.73M2 — SIGNIFICANT CHANGE UP
EOSINOPHIL # BLD AUTO: 0 K/UL — SIGNIFICANT CHANGE UP (ref 0–0.5)
EOSINOPHIL # BLD AUTO: 0 K/UL — SIGNIFICANT CHANGE UP (ref 0–0.5)
EOSINOPHIL NFR BLD AUTO: 0 % — SIGNIFICANT CHANGE UP (ref 0–6)
EOSINOPHIL NFR BLD AUTO: 0 % — SIGNIFICANT CHANGE UP (ref 0–6)
GAS PNL BLDV: 141 MMOL/L — SIGNIFICANT CHANGE UP (ref 136–145)
GAS PNL BLDV: SIGNIFICANT CHANGE UP
GLUCOSE BLDC GLUCOMTR-MCNC: 105 MG/DL — HIGH (ref 70–99)
GLUCOSE BLDC GLUCOMTR-MCNC: 118 MG/DL — HIGH (ref 70–99)
GLUCOSE BLDC GLUCOMTR-MCNC: 148 MG/DL — HIGH (ref 70–99)
GLUCOSE BLDC GLUCOMTR-MCNC: 387 MG/DL — HIGH (ref 70–99)
GLUCOSE BLDC GLUCOMTR-MCNC: 398 MG/DL — HIGH (ref 70–99)
GLUCOSE BLDC GLUCOMTR-MCNC: 78 MG/DL — SIGNIFICANT CHANGE UP (ref 70–99)
GLUCOSE BLDV-MCNC: 204 MG/DL — HIGH (ref 70–99)
GLUCOSE SERPL-MCNC: 185 MG/DL — HIGH (ref 70–99)
GLUCOSE SERPL-MCNC: 212 MG/DL — HIGH (ref 70–99)
HCO3 BLDV-SCNC: 29 MMOL/L — SIGNIFICANT CHANGE UP (ref 22–29)
HCT VFR BLD CALC: 31.2 % — LOW (ref 34.5–45)
HCT VFR BLD CALC: 32.3 % — LOW (ref 34.5–45)
HCT VFR BLDA CALC: 29 % — LOW (ref 34.5–46.5)
HGB BLD CALC-MCNC: 9.7 G/DL — LOW (ref 11.7–16.1)
HGB BLD-MCNC: 9.2 G/DL — LOW (ref 11.5–15.5)
HGB BLD-MCNC: 9.6 G/DL — LOW (ref 11.5–15.5)
IMM GRANULOCYTES NFR BLD AUTO: 0.5 % — SIGNIFICANT CHANGE UP (ref 0–1.5)
IMM GRANULOCYTES NFR BLD AUTO: 0.7 % — SIGNIFICANT CHANGE UP (ref 0–1.5)
INR BLD: 1.18 RATIO — HIGH (ref 0.88–1.16)
LACTATE BLDV-MCNC: 1.7 MMOL/L — SIGNIFICANT CHANGE UP (ref 0.7–2)
LYMPHOCYTES # BLD AUTO: 0.52 K/UL — LOW (ref 1–3.3)
LYMPHOCYTES # BLD AUTO: 1.18 K/UL — SIGNIFICANT CHANGE UP (ref 1–3.3)
LYMPHOCYTES # BLD AUTO: 3.4 % — LOW (ref 13–44)
LYMPHOCYTES # BLD AUTO: 7.2 % — LOW (ref 13–44)
MAGNESIUM SERPL-MCNC: 2.1 MG/DL — SIGNIFICANT CHANGE UP (ref 1.6–2.6)
MAGNESIUM SERPL-MCNC: 2.4 MG/DL — SIGNIFICANT CHANGE UP (ref 1.6–2.6)
MCHC RBC-ENTMCNC: 21.1 PG — LOW (ref 27–34)
MCHC RBC-ENTMCNC: 21.2 PG — LOW (ref 27–34)
MCHC RBC-ENTMCNC: 29.5 GM/DL — LOW (ref 32–36)
MCHC RBC-ENTMCNC: 29.7 GM/DL — LOW (ref 32–36)
MCV RBC AUTO: 70.8 FL — LOW (ref 80–100)
MCV RBC AUTO: 71.9 FL — LOW (ref 80–100)
METHOD TYPE: SIGNIFICANT CHANGE UP
MONOCYTES # BLD AUTO: 1.05 K/UL — HIGH (ref 0–0.9)
MONOCYTES # BLD AUTO: 1.11 K/UL — HIGH (ref 0–0.9)
MONOCYTES NFR BLD AUTO: 6.4 % — SIGNIFICANT CHANGE UP (ref 2–14)
MONOCYTES NFR BLD AUTO: 7.2 % — SIGNIFICANT CHANGE UP (ref 2–14)
NEUTROPHILS # BLD AUTO: 13.65 K/UL — HIGH (ref 1.8–7.4)
NEUTROPHILS # BLD AUTO: 14.16 K/UL — HIGH (ref 1.8–7.4)
NEUTROPHILS NFR BLD AUTO: 85.8 % — HIGH (ref 43–77)
NEUTROPHILS NFR BLD AUTO: 88.6 % — HIGH (ref 43–77)
NRBC # BLD: 0 /100 WBCS — SIGNIFICANT CHANGE UP (ref 0–0)
NRBC # BLD: 0 /100 WBCS — SIGNIFICANT CHANGE UP (ref 0–0)
ORGANISM # SPEC MICROSCOPIC CNT: SIGNIFICANT CHANGE UP
ORGANISM # SPEC MICROSCOPIC CNT: SIGNIFICANT CHANGE UP
PCO2 BLDV: 52 MMHG — HIGH (ref 39–42)
PH BLDV: 7.35 — SIGNIFICANT CHANGE UP (ref 7.32–7.43)
PHOSPHATE SERPL-MCNC: 1.7 MG/DL — LOW (ref 2.5–4.5)
PHOSPHATE SERPL-MCNC: 3.4 MG/DL — SIGNIFICANT CHANGE UP (ref 2.5–4.5)
PLATELET # BLD AUTO: 333 K/UL — SIGNIFICANT CHANGE UP (ref 150–400)
PLATELET # BLD AUTO: 342 K/UL — SIGNIFICANT CHANGE UP (ref 150–400)
PO2 BLDV: 48 MMHG — HIGH (ref 25–45)
POTASSIUM BLDV-SCNC: 3.9 MMOL/L — SIGNIFICANT CHANGE UP (ref 3.5–5.1)
POTASSIUM SERPL-MCNC: 4 MMOL/L — SIGNIFICANT CHANGE UP (ref 3.5–5.3)
POTASSIUM SERPL-MCNC: 4 MMOL/L — SIGNIFICANT CHANGE UP (ref 3.5–5.3)
POTASSIUM SERPL-SCNC: 4 MMOL/L — SIGNIFICANT CHANGE UP (ref 3.5–5.3)
POTASSIUM SERPL-SCNC: 4 MMOL/L — SIGNIFICANT CHANGE UP (ref 3.5–5.3)
PROT SERPL-MCNC: 6.3 G/DL — SIGNIFICANT CHANGE UP (ref 6–8.3)
PROT SERPL-MCNC: 6.6 G/DL — SIGNIFICANT CHANGE UP (ref 6–8.3)
PROTHROM AB SERPL-ACNC: 13.6 SEC — HIGH (ref 10.5–13.4)
RBC # BLD: 4.34 M/UL — SIGNIFICANT CHANGE UP (ref 3.8–5.2)
RBC # BLD: 4.56 M/UL — SIGNIFICANT CHANGE UP (ref 3.8–5.2)
RBC # FLD: 16.4 % — HIGH (ref 10.3–14.5)
RBC # FLD: 16.7 % — HIGH (ref 10.3–14.5)
SAO2 % BLDV: 68.2 % — SIGNIFICANT CHANGE UP (ref 67–88)
SODIUM SERPL-SCNC: 142 MMOL/L — SIGNIFICANT CHANGE UP (ref 135–145)
SODIUM SERPL-SCNC: 145 MMOL/L — SIGNIFICANT CHANGE UP (ref 135–145)
SPECIMEN SOURCE: SIGNIFICANT CHANGE UP
TROPONIN T, HIGH SENSITIVITY RESULT: 29 NG/L — SIGNIFICANT CHANGE UP (ref 0–51)
VANCOMYCIN TROUGH SERPL-MCNC: 10.6 UG/ML — SIGNIFICANT CHANGE UP (ref 10–20)
WBC # BLD: 15.41 K/UL — HIGH (ref 3.8–10.5)
WBC # BLD: 16.48 K/UL — HIGH (ref 3.8–10.5)
WBC # FLD AUTO: 15.41 K/UL — HIGH (ref 3.8–10.5)
WBC # FLD AUTO: 16.48 K/UL — HIGH (ref 3.8–10.5)

## 2022-07-05 PROCEDURE — 93010 ELECTROCARDIOGRAM REPORT: CPT

## 2022-07-05 PROCEDURE — 99233 SBSQ HOSP IP/OBS HIGH 50: CPT | Mod: GC

## 2022-07-05 RX ORDER — ACETAMINOPHEN 500 MG
650 TABLET ORAL ONCE
Refills: 0 | Status: COMPLETED | OUTPATIENT
Start: 2022-07-05 | End: 2022-07-05

## 2022-07-05 RX ORDER — FENTANYL CITRATE 50 UG/ML
12.5 INJECTION INTRAVENOUS ONCE
Refills: 0 | Status: DISCONTINUED | OUTPATIENT
Start: 2022-07-05 | End: 2022-07-05

## 2022-07-05 RX ORDER — QUETIAPINE FUMARATE 200 MG/1
25 TABLET, FILM COATED ORAL AT BEDTIME
Refills: 0 | Status: DISCONTINUED | OUTPATIENT
Start: 2022-07-05 | End: 2022-07-07

## 2022-07-05 RX ORDER — CHLORHEXIDINE GLUCONATE 213 G/1000ML
1 SOLUTION TOPICAL
Refills: 0 | Status: DISCONTINUED | OUTPATIENT
Start: 2022-07-05 | End: 2022-07-11

## 2022-07-05 RX ORDER — HYDRALAZINE HCL 50 MG
10 TABLET ORAL ONCE
Refills: 0 | Status: COMPLETED | OUTPATIENT
Start: 2022-07-05 | End: 2022-07-05

## 2022-07-05 RX ORDER — SODIUM,POTASSIUM PHOSPHATES 278-250MG
1 POWDER IN PACKET (EA) ORAL
Refills: 0 | Status: DISCONTINUED | OUTPATIENT
Start: 2022-07-05 | End: 2022-07-05

## 2022-07-05 RX ORDER — INSULIN LISPRO 100/ML
VIAL (ML) SUBCUTANEOUS
Refills: 0 | Status: DISCONTINUED | OUTPATIENT
Start: 2022-07-05 | End: 2022-07-08

## 2022-07-05 RX ORDER — INSULIN GLARGINE 100 [IU]/ML
5 INJECTION, SOLUTION SUBCUTANEOUS AT BEDTIME
Refills: 0 | Status: DISCONTINUED | OUTPATIENT
Start: 2022-07-05 | End: 2022-07-07

## 2022-07-05 RX ORDER — LANOLIN ALCOHOL/MO/W.PET/CERES
3 CREAM (GRAM) TOPICAL AT BEDTIME
Refills: 0 | Status: DISCONTINUED | OUTPATIENT
Start: 2022-07-05 | End: 2022-07-11

## 2022-07-05 RX ADMIN — FENTANYL CITRATE 12.5 MICROGRAM(S): 50 INJECTION INTRAVENOUS at 13:28

## 2022-07-05 RX ADMIN — Medication 3 MILLILITER(S): at 17:03

## 2022-07-05 RX ADMIN — FENTANYL CITRATE 12.5 MICROGRAM(S): 50 INJECTION INTRAVENOUS at 08:00

## 2022-07-05 RX ADMIN — MUPIROCIN 1 APPLICATION(S): 20 OINTMENT TOPICAL at 05:10

## 2022-07-05 RX ADMIN — MEROPENEM 100 MILLIGRAM(S): 1 INJECTION INTRAVENOUS at 14:10

## 2022-07-05 RX ADMIN — Medication 10 MILLIGRAM(S): at 20:33

## 2022-07-05 RX ADMIN — Medication 650 MILLIGRAM(S): at 13:10

## 2022-07-05 RX ADMIN — Medication 10 MILLIGRAM(S): at 05:10

## 2022-07-05 RX ADMIN — FAMOTIDINE 20 MILLIGRAM(S): 10 INJECTION INTRAVENOUS at 13:10

## 2022-07-05 RX ADMIN — Medication 3 MILLIGRAM(S): at 22:02

## 2022-07-05 RX ADMIN — QUETIAPINE FUMARATE 25 MILLIGRAM(S): 200 TABLET, FILM COATED ORAL at 21:17

## 2022-07-05 RX ADMIN — CHLORHEXIDINE GLUCONATE 1 APPLICATION(S): 213 SOLUTION TOPICAL at 21:23

## 2022-07-05 RX ADMIN — FENTANYL CITRATE 12.5 MICROGRAM(S): 50 INJECTION INTRAVENOUS at 07:42

## 2022-07-05 RX ADMIN — FENTANYL CITRATE 12.5 MICROGRAM(S): 50 INJECTION INTRAVENOUS at 14:00

## 2022-07-05 RX ADMIN — Medication 250 MILLIGRAM(S): at 18:23

## 2022-07-05 RX ADMIN — MEROPENEM 100 MILLIGRAM(S): 1 INJECTION INTRAVENOUS at 06:03

## 2022-07-05 RX ADMIN — QUETIAPINE FUMARATE 25 MILLIGRAM(S): 200 TABLET, FILM COATED ORAL at 00:19

## 2022-07-05 RX ADMIN — Medication 85 MILLIMOLE(S): at 16:28

## 2022-07-05 RX ADMIN — Medication 3 MILLILITER(S): at 11:17

## 2022-07-05 RX ADMIN — MEROPENEM 100 MILLIGRAM(S): 1 INJECTION INTRAVENOUS at 22:03

## 2022-07-05 RX ADMIN — MUPIROCIN 1 APPLICATION(S): 20 OINTMENT TOPICAL at 18:23

## 2022-07-05 RX ADMIN — Medication 250 MILLIGRAM(S): at 07:15

## 2022-07-05 RX ADMIN — HEPARIN SODIUM 1200 UNIT(S)/HR: 5000 INJECTION INTRAVENOUS; SUBCUTANEOUS at 02:02

## 2022-07-05 RX ADMIN — Medication 650 MILLIGRAM(S): at 14:00

## 2022-07-05 RX ADMIN — INSULIN GLARGINE 5 UNIT(S): 100 INJECTION, SOLUTION SUBCUTANEOUS at 22:03

## 2022-07-05 RX ADMIN — Medication 3 MILLILITER(S): at 23:36

## 2022-07-05 RX ADMIN — Medication 3 MILLILITER(S): at 05:45

## 2022-07-05 RX ADMIN — HEPARIN SODIUM 0 UNIT(S)/HR: 5000 INJECTION INTRAVENOUS; SUBCUTANEOUS at 01:09

## 2022-07-05 NOTE — PROGRESS NOTE ADULT - ASSESSMENT
72yo F w/ right medial navicular wound  - pt seen and evaluated  - Afebrile, WBC 16  - right foot medial navicular wound to bone w resolving erythema periwound, no malodor, no bogginess, no drainage noted, no tracking noted today  - R foot wound culture proteus and staph aureus  - R foot MRI: 1cm abscess/bursa measuring 1cm, OM navicular  - continue IV antibiotics  - pod plan local wound care versus surgical intervention pending discussion with family and attending, and OR availability  - please document medical optimization for surgical intvn under local anestshia and light IV sedation   - discussed w/ attending  72yo F w/ right medial navicular wound  - pt seen and evaluated  - Afebrile, WBC 16  - right foot medial navicular wound to bone w resolving erythema periwound, no malodor, no bogginess, no drainage noted, no tracking noted today  - R foot wound culture proteus and staph aureus  - R foot MRI: 1cm abscess/bursa measuring 1cm, OM navicular  - continue IV antibiotics  - patient is scheduled for right foot incision and drainage and wound debridment tomorrow at 10:30am with Dr. Valenzuela  -NPO after midnight  - please perform covid swab  - documented medical optimization for surgical intvn under local anestshia and light IV sedation, appreciated  - discussed w/ attending

## 2022-07-05 NOTE — PROGRESS NOTE ADULT - SUBJECTIVE AND OBJECTIVE BOX
INTERVAL HPI/OVERNIGHT EVENTS: No acute events overnight. Seroquel 25 bid started; precedex d/judi.    MRI showed osteomyelitis and developing abscess. Rfoot wound culture proteus and staph aureus.     Pt with fingersticks pre-meal in 300s. Adjusted solumedrol to 10 mg IV.    SUBJECTIVE: Patient seen and examined at bedside. Appears comfortable.      On meropenem and vanc for osteomyelitis.    Vital Signs Last 24 Hrs  T(C): 36.3 (2022 04:00), Max: 36.9 (2022 16:00)  T(F): 97.4 (2022 04:00), Max: 98.4 (2022 16:00)  HR: 90 (2022 06:00) (52 - 109)  BP: 139/107 (2022 04:00) (94/50 - 162/70)  BP(mean): 117 (2022 04:00) (69 - 117)  RR: 15 (2022 06:00) (14 - 31)  SpO2: 96% (2022 06:00) (92% - 100%)    Mode: AC/ CMV (Assist Control/ Continuous Mandatory Ventilation), RR (machine): 18, TV (machine): 450, FiO2: 21, PEEP: 5, ITime: 1, MAP: 10, PIP: 26  Plateau pressure:   P/F ratio:     07-03 @ 07:01  -  04 @ 07:00  --------------------------------------------------------  IN: 1703.6 mL / OUT: 555 mL / NET: 1148.6 mL      CAPILLARY BLOOD GLUCOSE      POCT Blood Glucose.: 311 mg/dL (2022 05:21)    ECG:    PHYSICAL EXAM:    General: NAD, intubated & sedated  HEENT: NC/AT, L eye fixed& unresponsive to light, no scleral icterus  Neck: supple  Respiratory: clear to auscultation in anterior lung fields, mechanical breath sounds  Cardiovascular: +S1/S2, RRR, no murmurs/gallops/rubs  Abdomen: soft, non-distended, bowel sounds present  Extremities: warm, no peripheral edema bilaterally  Neurological: A&Ox0    MEDICATIONS:  MEDICATIONS  (STANDING):  albuterol/ipratropium for Nebulization 3 milliLiter(s) Nebulizer every 6 hours  chlorhexidine 0.12% Liquid 15 milliLiter(s) Oral Mucosa every 12 hours  dexMEDEtomidine Infusion 0.2 MICROgram(s)/kG/Hr (3.18 mL/Hr) IV Continuous <Continuous>  dextrose 5%. 1000 milliLiter(s) (100 mL/Hr) IV Continuous <Continuous>  dextrose 5%. 1000 milliLiter(s) (50 mL/Hr) IV Continuous <Continuous>  dextrose 50% Injectable 25 Gram(s) IV Push once  dextrose 50% Injectable 12.5 Gram(s) IV Push once  dextrose 50% Injectable 25 Gram(s) IV Push once  famotidine Injectable 20 milliGRAM(s) IV Push daily  fentaNYL   Infusion.. 0.5 MICROgram(s)/kG/Hr (1.59 mL/Hr) IV Continuous <Continuous>  glucagon  Injectable 1 milliGRAM(s) IntraMuscular once  heparin  Infusion.  Unit(s)/Hr (11 mL/Hr) IV Continuous <Continuous>  insulin lispro (ADMELOG) corrective regimen sliding scale   SubCutaneous every 6 hours  insulin NPH human recombinant 12 Unit(s) SubCutaneous every 6 hours  meropenem  IVPB 1000 milliGRAM(s) IV Intermittent every 8 hours  methylPREDNISolone sodium succinate Injectable 20 milliGRAM(s) IV Push every 8 hours  norepinephrine Infusion 0.05 MICROgram(s)/kG/Min (5.86 mL/Hr) IV Continuous <Continuous>  propofol Infusion 15.253 MICROgram(s)/kG/Min (5.72 mL/Hr) IV Continuous <Continuous>  vancomycin  IVPB 1000 milliGRAM(s) IV Intermittent every 12 hours    MEDICATIONS  (PRN):  dextrose Oral Gel 15 Gram(s) Oral once PRN Blood Glucose LESS THAN 70 milliGRAM(s)/deciliter  fentaNYL    Injectable 25 MICROGram(s) IV Push every 15 minutes PRN Agitation      ALLERGIES:  Allergies    aspirin (Short breath)  Avelox (Short breath; Pruritus)  cefepime (Anaphylaxis)  codeine (Short breath)  Dilaudid (Short breath)  iodine (Short breath; Swelling)  penicillin (Unknown)  shellfish (Anaphylaxis)  tetanus toxoid (Short breath)  Valium (Short breath)    Intolerances        LABS:                        9.6    16.48 )-----------( 352      ( 2022 01:16 )             31.6     07-04    138  |  102  |  33<H>  ----------------------------<  372<H>  4.2   |  24  |  0.64    Ca    8.8      2022 01:16  Phos  3.2     07-04  Mg     2.5     07-04    TPro  6.3  /  Alb  3.0<L>  /  TBili  0.2  /  DBili  x   /  AST  20  /  ALT  17  /  AlkPhos  88  07-04    PT/INR - ( 2022 01:16 )   PT: 13.7 sec;   INR: 1.18 ratio         PTT - ( 2022 01:16 )  PTT:85.9 sec  Urinalysis Basic - ( 2022 15:44 )    Color: Yellow / Appearance: Clear / S.024 / pH: x  Gluc: x / Ketone: Large  / Bili: Small / Urobili: 2 mg/dL   Blood: x / Protein: 30 mg/dL / Nitrite: Negative   Leuk Esterase: Negative / RBC: 1 /hpf / WBC 1 /HPF   Sq Epi: x / Non Sq Epi: 1 /hpf / Bacteria: Negative        RADIOLOGY & ADDITIONAL TESTS: Reviewed.   INTERVAL HPI/OVERNIGHT EVENTS: No acute events overnight. Seroquel 25 bid started; precedex d/judi.    MRI showed osteomyelitis and developing abscess. Rfoot wound culture proteus and staph aureus. On meropenem and vanc for osteomyelitis.      SUBJECTIVE: Patient seen and examined at bedside. Appears comfortable. Mental status improved, smiling next to daughter.         Vital Signs Last 24 Hrs  T(C): 36.3 (05 Jul 2022 04:00), Max: 36.9 (04 Jul 2022 16:00)  T(F): 97.4 (05 Jul 2022 04:00), Max: 98.4 (04 Jul 2022 16:00)  HR: 90 (05 Jul 2022 06:00) (52 - 109)  BP: 139/107 (05 Jul 2022 04:00) (94/50 - 162/70)  BP(mean): 117 (05 Jul 2022 04:00) (69 - 117)  RR: 15 (05 Jul 2022 06:00) (14 - 31)  SpO2: 96% (05 Jul 2022 06:00) (92% - 100%)    I&O's Summary    04 Jul 2022 07:01  -  05 Jul 2022 07:00  --------------------------------------------------------  IN: 706.4 mL / OUT: 950 mL / NET: -243.6 mL    05 Jul 2022 07:01  -  05 Jul 2022 14:08  --------------------------------------------------------  IN: 60 mL / OUT: 150 mL / NET: -90 mL      CAPILLARY BLOOD GLUCOSE      POCT Blood Glucose.: 311 mg/dL (04 Jul 2022 05:21)    ECG:    PHYSICAL EXAM:    General: NAD  HEENT: NC/AT, no scleral icterus  Neck: supple  Respiratory: clear to auscultation in anterior lung fields, mechanical breath sounds  Cardiovascular: +S1/S2, RRR, no murmurs/gallops/rubs  Abdomen: soft, non-distended, bowel sounds present  Extremities: warm, no peripheral edema bilaterally  Neurological: A&Ox2-3    MEDICATIONS:  MEDICATIONS  (STANDING):  albuterol/ipratropium for Nebulization 3 milliLiter(s) Nebulizer every 6 hours  chlorhexidine 0.12% Liquid 15 milliLiter(s) Oral Mucosa every 12 hours  dexMEDEtomidine Infusion 0.2 MICROgram(s)/kG/Hr (3.18 mL/Hr) IV Continuous <Continuous>  dextrose 5%. 1000 milliLiter(s) (100 mL/Hr) IV Continuous <Continuous>  dextrose 5%. 1000 milliLiter(s) (50 mL/Hr) IV Continuous <Continuous>  dextrose 50% Injectable 25 Gram(s) IV Push once  dextrose 50% Injectable 12.5 Gram(s) IV Push once  dextrose 50% Injectable 25 Gram(s) IV Push once  famotidine Injectable 20 milliGRAM(s) IV Push daily  fentaNYL   Infusion.. 0.5 MICROgram(s)/kG/Hr (1.59 mL/Hr) IV Continuous <Continuous>  glucagon  Injectable 1 milliGRAM(s) IntraMuscular once  heparin  Infusion.  Unit(s)/Hr (11 mL/Hr) IV Continuous <Continuous>  insulin lispro (ADMELOG) corrective regimen sliding scale   SubCutaneous every 6 hours  insulin NPH human recombinant 12 Unit(s) SubCutaneous every 6 hours  meropenem  IVPB 1000 milliGRAM(s) IV Intermittent every 8 hours  methylPREDNISolone sodium succinate Injectable 20 milliGRAM(s) IV Push every 8 hours  norepinephrine Infusion 0.05 MICROgram(s)/kG/Min (5.86 mL/Hr) IV Continuous <Continuous>  propofol Infusion 15.253 MICROgram(s)/kG/Min (5.72 mL/Hr) IV Continuous <Continuous>  vancomycin  IVPB 1000 milliGRAM(s) IV Intermittent every 12 hours    MEDICATIONS  (PRN):  dextrose Oral Gel 15 Gram(s) Oral once PRN Blood Glucose LESS THAN 70 milliGRAM(s)/deciliter  fentaNYL    Injectable 25 MICROGram(s) IV Push every 15 minutes PRN Agitation      ALLERGIES:  Allergies    aspirin (Short breath)  Avelox (Short breath; Pruritus)  cefepime (Anaphylaxis)  codeine (Short breath)  Dilaudid (Short breath)  iodine (Short breath; Swelling)  penicillin (Unknown)  shellfish (Anaphylaxis)  tetanus toxoid (Short breath)  Valium (Short breath)    Intolerances        LABS:  CBC Full  -  ( 05 Jul 2022 00:32 )  WBC Count : 16.48 K/uL  RBC Count : 4.56 M/uL  Hemoglobin : 9.6 g/dL  Hematocrit : 32.3 %  Platelet Count - Automated : 333 K/uL  Mean Cell Volume : 70.8 fl  Mean Cell Hemoglobin : 21.1 pg  Mean Cell Hemoglobin Concentration : 29.7 gm/dL  Auto Neutrophil # : 14.16 K/uL  Auto Lymphocyte # : 1.18 K/uL  Auto Monocyte # : 1.05 K/uL  Auto Eosinophil # : 0.00 K/uL  Auto Basophil # : 0.01 K/uL  Auto Neutrophil % : 85.8 %  Auto Lymphocyte % : 7.2 %  Auto Monocyte % : 6.4 %  Auto Eosinophil % : 0.0 %  Auto Basophil % : 0.1 %    07-05    145  |  109<H>  |  21  ----------------------------<  212<H>  4.0   |  26  |  0.45<L>    Ca    8.7      05 Jul 2022 00:32  Phos  3.4     07-05  Mg     2.4     07-05    TPro  6.3  /  Alb  3.1<L>  /  TBili  0.1<L>  /  DBili  x   /  AST  23  /  ALT  26  /  AlkPhos  85  07-05        RADIOLOGY & ADDITIONAL TESTS: Reviewed.

## 2022-07-05 NOTE — PROGRESS NOTE ADULT - SUBJECTIVE AND OBJECTIVE BOX
Patient is a 71y old  Female who presents with a chief complaint of Anaphylaxis (05 Jul 2022 07:16)       INTERVAL HPI/OVERNIGHT EVENTS:  Patient seen and evaluated at bedside.  Pt is resting comfortable in NAD. Denies N/V/F/C.    Allergies    aspirin (Short breath)  Avelox (Short breath; Pruritus)  cefepime (Anaphylaxis)  codeine (Short breath)  Dilaudid (Short breath)  iodine (Short breath; Swelling)  penicillin (Unknown)  shellfish (Anaphylaxis)  tetanus toxoid (Short breath)  Valium (Short breath)    Intolerances        Vital Signs Last 24 Hrs  T(C): 36.6 (05 Jul 2022 08:00), Max: 36.9 (04 Jul 2022 16:00)  T(F): 97.8 (05 Jul 2022 08:00), Max: 98.4 (04 Jul 2022 16:00)  HR: 85 (05 Jul 2022 08:00) (52 - 109)  BP: 169/72 (05 Jul 2022 08:00) (111/54 - 169/72)  BP(mean): 104 (05 Jul 2022 08:00) (78 - 117)  RR: 20 (05 Jul 2022 08:00) (14 - 31)  SpO2: 97% (05 Jul 2022 08:00) (92% - 100%)    LABS:                        9.6    16.48 )-----------( 333      ( 05 Jul 2022 00:32 )             32.3     07-05    145  |  109<H>  |  21  ----------------------------<  212<H>  4.0   |  26  |  0.45<L>    Ca    8.7      05 Jul 2022 00:32  Phos  3.4     07-05  Mg     2.4     07-05    TPro  6.3  /  Alb  3.1<L>  /  TBili  0.1<L>  /  DBili  x   /  AST  23  /  ALT  26  /  AlkPhos  85  07-05    PT/INR - ( 05 Jul 2022 00:32 )   PT: 13.6 sec;   INR: 1.18 ratio         PTT - ( 05 Jul 2022 08:02 )  PTT:81.0 sec    CAPILLARY BLOOD GLUCOSE      POCT Blood Glucose.: 105 mg/dL (05 Jul 2022 05:24)  POCT Blood Glucose.: 210 mg/dL (04 Jul 2022 23:13)  POCT Blood Glucose.: 168 mg/dL (04 Jul 2022 17:45)  POCT Blood Glucose.: 163 mg/dL (04 Jul 2022 17:43)  POCT Blood Glucose.: 348 mg/dL (04 Jul 2022 11:23)      Lower Extremity Physical Exam:  Vascular: DP/PT 2/4 B/L, CFT <3 seconds B/L, Temperature gradient warm to cool B/L  Neuro: Epicritic sensation intact to the level of midfoot B/L  Musculoskeletal/Ortho: unremarkable   Skin: right foot medial navicular wound to bone w resolving erythema periwound, no malodor, no bogginess, no drainage noted, no tracking noted      RADIOLOGY & ADDITIONAL TESTS:  < from: MR Foot w/wo IV Cont, Right (07.03.22 @ 23:43) >    ACC: [id number] EXAM:  MR FOOT WAW IC RT                          PROCEDURE DATE:  07/03/2022          INTERPRETATION:  MR HINDFOOT/ANKLE WITHOUT AND WITH IV CONTRAST RIGHT    HISTORY:  Diabetic foot swelling. Concern for osteomyelitis. Wound.    TECHNIQUE:  Multiplanar MRI of the right hindfoot/ankle was performed   without and with 5.5cc cc administered Gadavist intravenous gadolinium   contrast using 10 sequences.    COMPARISON:  Right foot x-rays dated 7/2/2022 and MRI dated 5/11/2022    FINDINGS:    OSSEOUS STRUCTURES    Acute Fractures/Contusions:  None.    Chronic Fractures/Ossifications:  None.    Marrow:  Soft tissue wound is present along the medial margin of the   navicular with underlying marrow edema with mild T1 marrow replacement   and enhancement of the medial margin of the navicular concerning for   osteomyelitis.    Tarsal Coalition:  None.    LIGAMENTS    Talofibular/Calcaneofibular Ligaments:  Intact.    Tibiofibular/Syndesmotic Ligaments:  Intact.    Medial Deltoid Ligament Complex:  Intact.    Spring/Subtalar Ligaments:  Intact.    TENDONS    Posterior Tibialis/Medial Flexor Tendons:  Intact.    Peroneal Tendons:  Intact.    Extensor Tendons:  Intact.    ACHILLES TENDON  Intact.    PLANTAR FASCIA  Intact.    JOINTS    Tibiotalar Joint:  Preserved.    Subtalar Joints:  Preserved.    Intertarsal Joints:  Preserved.    Tarsometatarsal Joints:  Preserved.    SOFT TISSUES    Masses/Cysts:  None.    Musculature:  Intact.    Subcutaneous Tissues:  Small ill-defined fluid collection is present   within the plantar heel fat-pad suggesting an adventitial bursa versus   developing abscess measuring up to 1 cm in size near the calcaneal   tuberosity and plantar fascia. There is mild surrounding enhancement.   Mild subcutaneous edema is present.    NEUROVASCULAR STRUCTURES    Tarsal Tunnel:  Preserved.    IMPRESSION:  1. Soft tissue wound along the medial margin of the navicular with   changes concerning for underlying osteomyelitis of the navicular.  2. Small ill-defined subcutaneous fluid collection of the plantar heel   fat-pad that may reflect an adventitial bursa versus developing abscess   measuring up to 1.0 cm in size. Correlation is suggested for signs of   infection in this location.    --- End of Report ---            FRANCISCA HERRERA MD; Attending Radiologist  This document has been electronically signed. Jul 4 2022  8:50AM    < end of copied text >

## 2022-07-05 NOTE — PROGRESS NOTE ADULT - ATTENDING COMMENTS
1. Acute respiratory failure due to anaphylaxis to cefepime.   H/o tracheomalacia. Continue steroids. H/O also of COPD vs Asthma. . Tolerating extubation.    2. Anaphylactic shock . No pressors . Decrease steroids to 10mg due to delirium. Continue solumedrol 60mg daily. Allergic to cefepime. Will need allergy consult as outpatient. Allergic to multiple antibiotics.    3. ID R ankle wound. Debrided. ? osteo. Cx pending. Continue Vancomycin and Meropenem. MRI of ankle and foot. Preliminary results of wound culture show MRSA and  ESBL proteus  infection.     4. Cardiac. Chronic atrial fib on Eliquis. Eliquis on hold. Heparin Drip started. Ventricular rate controlled.                   Episode of VTACH yesterday after epinephrine. Given Lidocaine. No recurrence .    5. H/o colon cancer with ostomy. Ostomy functioning well.    6. Adrenal insufficiency. Change  solumedrol to 10mg daily.  7. Delirium. Steroids decreased to 10mg daily.  Decrease Seroquel to 25 mg QHS.

## 2022-07-05 NOTE — CHART NOTE - NSCHARTNOTEFT_GEN_A_CORE
Pt w/ feet wounds and followed w/ podiatry and will defer to podiatry.  Pt w/ ostomy and known to ostomy RN team. will defer to them.  D/w team who is agreeable. Remain available as requested.

## 2022-07-05 NOTE — CHART NOTE - NSCHARTNOTEFT_GEN_A_CORE
MICU DOWN GRADE NOTE      Patient is a 71y old  Female who presents with a chief complaint of Anaphylaxis (2022 09:56)    MICU Course: 71 year old female with PMH DM, COPD (chronic asthma), history of Chronic Adrenal Insufficiency on Chronic prednisone history of colorectal cancer s/p resection (colostomy bag), Hx of CAD, Chronic A fib on Eliquis, and tracheomalacia and multiple intubations presenting with right foot wound concerning for OM s/p wound care. Course complicated by medication induced anaphylaxis 2/2 cefepime in setting of penicillin allergy. Further complicated by V-tach with pulse after epi administration, requiring lidocaine for . Patient was intubated and sedated for hypoxia and airway protection. Pt was successfully extubated, with improving mental status on nasal cannula. MRI showed osteomyelitis and developing abscess in R foot, with wound cultures showing MRSA and proteus mirabilis. Patient was continued on meropenem and vancomycin. Podiatry is following this patient, please follow up regarding local wound care vs. surgical intervention for foot, after they discuss with family.    Patient is hemodynamically stable and ready for transfer to medicine floor.      REVIEW OF SYSTEMS:  CONSTITUTIONAL: No fever, chills  HEENT:  No blurry vision No sinus or throat pain  NECK: No pain or stiffness  RESPIRATORY: No cough, wheezing, chills or hemoptysis; No shortness of breath  CARDIOVASCULAR: No chest pain, palpitations  GASTROINTESTINAL: No abdominal pain. No nausea, vomiting, or diarrhea  GENITOURINARY: No dysuria  NEUROLOGICAL: No HA, No focal weakness  SKIN: No itching, burning, rashes, or lesions   MUSCULOSKELETAL: No joint pain or swelling; No muscle, back, or extremity pain    MEDICATIONS:  albuterol/ipratropium for Nebulization 3 milliLiter(s) Nebulizer every 6 hours  chlorhexidine 2% Cloths 1 Application(s) Topical <User Schedule>  dextrose 5%. 1000 milliLiter(s) IV Continuous <Continuous>  dextrose 5%. 1000 milliLiter(s) IV Continuous <Continuous>  dextrose 50% Injectable 25 Gram(s) IV Push once  dextrose 50% Injectable 12.5 Gram(s) IV Push once  dextrose 50% Injectable 25 Gram(s) IV Push once  dextrose Oral Gel 15 Gram(s) Oral once PRN  famotidine Injectable 20 milliGRAM(s) IV Push daily  glucagon  Injectable 1 milliGRAM(s) IntraMuscular once  haloperidol IVPB 1 milliGRAM(s) IV Intermittent every 6 hours PRN  heparin  Infusion.  Unit(s)/Hr IV Continuous <Continuous>  insulin glargine Injectable (LANTUS) 5 Unit(s) SubCutaneous at bedtime  insulin lispro (ADMELOG) corrective regimen sliding scale   SubCutaneous three times a day before meals  meropenem  IVPB 1000 milliGRAM(s) IV Intermittent every 8 hours  methylPREDNISolone sodium succinate Injectable 10 milliGRAM(s) IV Push daily  mupirocin 2% Nasal 1 Application(s) Both Nostrils two times a day  QUEtiapine 25 milliGRAM(s) Oral at bedtime  vancomycin  IVPB 1000 milliGRAM(s) IV Intermittent every 12 hours      T(C): 36.6 (22 @ 08:00), Max: 36.9 (22 @ 16:00)  HR: 100 (22 @ 12:00) (52 - 102)  BP: 169/72 (22 @ 08:00) (120/56 - 169/72)  RR: 36 (22 @ 12:00) (14 - 36)  SpO2: 97% (22 @ 12:00) (92% - 100%)  Wt(kg): --Vital Signs Last 24 Hrs  T(C): 36.6 (2022 08:00), Max: 36.9 (2022 16:00)  T(F): 97.8 (2022 08:00), Max: 98.4 (2022 16:00)  HR: 100 (2022 12:00) (52 - 102)  BP: 169/72 (2022 08:00) (120/56 - 169/72)  BP(mean): 104 (2022 08:00) (80 - 117)  RR: 36 (2022 12:00) (14 - 36)  SpO2: 97% (2022 12:00) (92% - 100%)    PHYSICAL EXAM:  GENERAL: NAD, well-groomed, well-developed  HEAD:  Atraumatic, Normocephalic  EYES: EOMI, PERRLA, conjunctiva and sclera clear  ENMT:  Moist mucous membranes  NECK: Supple, No JVD,  CHEST/LUNG: Clear to auscultation bilaterally; No rales, rhonchi, wheezing, or rubs  HEART: Regular rate and rhythm; No murmurs, rubs, or gallops  ABDOMEN: Soft, Nontender, Nondistended; Bowel sounds present  NEURO: Alert & Oriented X3  EXTREMITIES: No LE edema, no calf tenderness  LYMPH: No lymphadenopathy noted  SKIN: No rashes or lesions    Consultant(s) Notes Reviewed:  [x ] YES  [ ] NO  Care Discussed with Consultants/Other Providers [ x] YES  [ ] NO    LABS:                        9.6    16.48 )-----------( 333      ( 2022 00:32 )             32.3     07-05    145  |  109<H>  |  21  ----------------------------<  212<H>  4.0   |  26  |  0.45<L>    Ca    8.7      2022 00:32  Phos  3.4     07-05  Mg     2.4     07-05    TPro  6.3  /  Alb  3.1<L>  /  TBili  0.1<L>  /  DBili  x   /  AST  23  /  ALT  26  /  AlkPhos  85  07-05    PT/INR - ( 2022 00:32 )   PT: 13.6 sec;   INR: 1.18 ratio         PTT - ( 2022 08:02 )  PTT:81.0 sec    CAPILLARY BLOOD GLUCOSE      POCT Blood Glucose.: 78 mg/dL (2022 12:51)  POCT Blood Glucose.: 105 mg/dL (2022 05:24)  POCT Blood Glucose.: 210 mg/dL (2022 23:13)  POCT Blood Glucose.: 168 mg/dL (2022 17:45)  POCT Blood Glucose.: 163 mg/dL (2022 17:43)      ABG - ( 2022 13:21 )  pH, Arterial: 7.49  pH, Blood: x     /  pCO2: 33    /  pO2: 148   / HCO3: 25    / Base Excess: 2.0   /  SaO2: 96.2       RADIOLOGY & ADDITIONAL TESTS:    Imaging Personally Reviewed:  [x ] YES  [ ] NO      To Dos:    -f/u podiatry recs (local wound care)  -f/u fingersticks. Pt's insulin regimen was recently adjusted to 5 lantus + premeal ISS, in the setting of having lowered solumedrol dose to 10 mg. MICU DOWN GRADE NOTE    Transfer to:     Physician:      Patient is a 71y old  Female who presents with a chief complaint of Anaphylaxis (2022 09:56)    MICU Course: 71 year old female with PMH DM, COPD (chronic asthma), history of Chronic Adrenal Insufficiency on Chronic prednisone history of colorectal cancer s/p resection (colostomy bag), Hx of CAD, Chronic A fib on Eliquis, and tracheomalacia and multiple intubations presenting with right foot wound concerning for OM s/p wound care. Course complicated by medication induced anaphylaxis 2/2 cefepime in setting of penicillin allergy. Further complicated by V-tach with pulse after epi administration, requiring lidocaine for . Patient was intubated and sedated for hypoxia and airway protection. Pt was successfully extubated, with improving mental status on nasal cannula. MRI showed osteomyelitis and developing abscess in R foot, with wound cultures showing MRSA and proteus mirabilis. Patient was continued on meropenem and vancomycin. Podiatry is following this patient, please follow up regarding local wound care vs. surgical intervention for foot, after they discuss with family.    Patient is hemodynamically stable and ready for transfer to medicine floor.      REVIEW OF SYSTEMS:  CONSTITUTIONAL: No fever, chills  HEENT:  No blurry vision No sinus or throat pain  NECK: No pain or stiffness  RESPIRATORY: No cough, wheezing, chills or hemoptysis; No shortness of breath  CARDIOVASCULAR: No chest pain, palpitations  GASTROINTESTINAL: No abdominal pain. No nausea, vomiting, or diarrhea  GENITOURINARY: No dysuria  NEUROLOGICAL: No HA, No focal weakness  SKIN: No itching, burning, rashes, or lesions   MUSCULOSKELETAL: No joint pain or swelling; No muscle, back, or extremity pain    MEDICATIONS:  albuterol/ipratropium for Nebulization 3 milliLiter(s) Nebulizer every 6 hours  chlorhexidine 2% Cloths 1 Application(s) Topical <User Schedule>  dextrose 5%. 1000 milliLiter(s) IV Continuous <Continuous>  dextrose 5%. 1000 milliLiter(s) IV Continuous <Continuous>  dextrose 50% Injectable 25 Gram(s) IV Push once  dextrose 50% Injectable 12.5 Gram(s) IV Push once  dextrose 50% Injectable 25 Gram(s) IV Push once  dextrose Oral Gel 15 Gram(s) Oral once PRN  famotidine Injectable 20 milliGRAM(s) IV Push daily  glucagon  Injectable 1 milliGRAM(s) IntraMuscular once  haloperidol IVPB 1 milliGRAM(s) IV Intermittent every 6 hours PRN  heparin  Infusion.  Unit(s)/Hr IV Continuous <Continuous>  insulin glargine Injectable (LANTUS) 5 Unit(s) SubCutaneous at bedtime  insulin lispro (ADMELOG) corrective regimen sliding scale   SubCutaneous three times a day before meals  meropenem  IVPB 1000 milliGRAM(s) IV Intermittent every 8 hours  methylPREDNISolone sodium succinate Injectable 10 milliGRAM(s) IV Push daily  mupirocin 2% Nasal 1 Application(s) Both Nostrils two times a day  QUEtiapine 25 milliGRAM(s) Oral at bedtime  vancomycin  IVPB 1000 milliGRAM(s) IV Intermittent every 12 hours      T(C): 36.6 (22 @ 08:00), Max: 36.9 (22 @ 16:00)  HR: 100 (22 @ 12:00) (52 - 102)  BP: 169/72 (22 @ 08:00) (120/56 - 169/72)  RR: 36 (22 @ 12:00) (14 - 36)  SpO2: 97% (22 @ 12:00) (92% - 100%)  Wt(kg): --Vital Signs Last 24 Hrs  T(C): 36.6 (2022 08:00), Max: 36.9 (2022 16:00)  T(F): 97.8 (2022 08:00), Max: 98.4 (2022 16:00)  HR: 100 (2022 12:00) (52 - 102)  BP: 169/72 (2022 08:00) (120/56 - 169/72)  BP(mean): 104 (2022 08:00) (80 - 117)  RR: 36 (2022 12:00) (14 - 36)  SpO2: 97% (2022 12:00) (92% - 100%)    PHYSICAL EXAM:  GENERAL: NAD, well-groomed, well-developed  HEAD:  Atraumatic, Normocephalic  EYES: EOMI, PERRLA, conjunctiva and sclera clear  ENMT:  Moist mucous membranes  NECK: Supple, No JVD,  CHEST/LUNG: Clear to auscultation bilaterally; No rales, rhonchi, wheezing, or rubs  HEART: Regular rate and rhythm; No murmurs, rubs, or gallops  ABDOMEN: Soft, Nontender, Nondistended; Bowel sounds present  NEURO: Alert & Oriented X3  EXTREMITIES: No LE edema, no calf tenderness  LYMPH: No lymphadenopathy noted  SKIN: No rashes or lesions    Consultant(s) Notes Reviewed:  [x ] YES  [ ] NO  Care Discussed with Consultants/Other Providers [ x] YES  [ ] NO    LABS:                        9.6    16.48 )-----------( 333      ( 2022 00:32 )             32.3     07-05    145  |  109<H>  |  21  ----------------------------<  212<H>  4.0   |  26  |  0.45<L>    Ca    8.7      2022 00:32  Phos  3.4     07-05  Mg     2.4     07-05    TPro  6.3  /  Alb  3.1<L>  /  TBili  0.1<L>  /  DBili  x   /  AST  23  /  ALT  26  /  AlkPhos  85  07-05    PT/INR - ( 2022 00:32 )   PT: 13.6 sec;   INR: 1.18 ratio         PTT - ( 2022 08:02 )  PTT:81.0 sec    CAPILLARY BLOOD GLUCOSE      POCT Blood Glucose.: 78 mg/dL (2022 12:51)  POCT Blood Glucose.: 105 mg/dL (2022 05:24)  POCT Blood Glucose.: 210 mg/dL (2022 23:13)  POCT Blood Glucose.: 168 mg/dL (2022 17:45)  POCT Blood Glucose.: 163 mg/dL (2022 17:43)      ABG - ( 2022 13:21 )  pH, Arterial: 7.49  pH, Blood: x     /  pCO2: 33    /  pO2: 148   / HCO3: 25    / Base Excess: 2.0   /  SaO2: 96.2       RADIOLOGY & ADDITIONAL TESTS:    Imaging Personally Reviewed:  [x ] YES  [ ] NO      To Dos:    -f/u podiatry recs (procedural treatment vs local wound care)  -f/u fingersticks. Pt has been having elevated FS while on high dose steroids. Home regimen is 10U lantus, 4U admelog pre-prandial, ISS; while in the MICU we changed it to 5U lantus and ISS considering that pt was not fully eating. Can adjust insulin regimen while on floor MICU DOWN GRADE NOTE    Transfer to:     Physician: Dr. Doran      Patient is a 71y old  Female who presents with a chief complaint of Anaphylaxis (2022 09:56)    MICU Course: 71 year old female with PMH DM, COPD (chronic asthma), history of Chronic Adrenal Insufficiency on Chronic prednisone history of colorectal cancer s/p resection (colostomy bag), Hx of CAD, Chronic A fib on Eliquis, and tracheomalacia and multiple intubations presenting with right foot wound concerning for OM s/p wound care. Course complicated by medication induced anaphylaxis 2/2 cefepime in setting of penicillin allergy. Further complicated by V-tach with pulse after epi administration, requiring lidocaine for . Patient was intubated and sedated for hypoxia and airway protection. Pt was successfully extubated, with improving mental status on nasal cannula. MRI showed osteomyelitis and developing abscess in R foot, with wound cultures showing MRSA and proteus mirabilis. Patient was continued on meropenem and vancomycin. Podiatry is following this patient, please follow up regarding local wound care vs. surgical intervention for foot, after they discuss with family.    Patient is hemodynamically stable and ready for transfer to medicine floor.      REVIEW OF SYSTEMS:  CONSTITUTIONAL: No fever, chills  HEENT:  No blurry vision No sinus or throat pain  NECK: No pain or stiffness  RESPIRATORY: No cough, wheezing, chills or hemoptysis; No shortness of breath  CARDIOVASCULAR: No chest pain, palpitations  GASTROINTESTINAL: No abdominal pain. No nausea, vomiting, or diarrhea  GENITOURINARY: No dysuria  NEUROLOGICAL: No HA, No focal weakness  SKIN: No itching, burning, rashes, or lesions   MUSCULOSKELETAL: No joint pain or swelling; No muscle, back, or extremity pain    MEDICATIONS:  albuterol/ipratropium for Nebulization 3 milliLiter(s) Nebulizer every 6 hours  chlorhexidine 2% Cloths 1 Application(s) Topical <User Schedule>  dextrose 5%. 1000 milliLiter(s) IV Continuous <Continuous>  dextrose 5%. 1000 milliLiter(s) IV Continuous <Continuous>  dextrose 50% Injectable 25 Gram(s) IV Push once  dextrose 50% Injectable 12.5 Gram(s) IV Push once  dextrose 50% Injectable 25 Gram(s) IV Push once  dextrose Oral Gel 15 Gram(s) Oral once PRN  famotidine Injectable 20 milliGRAM(s) IV Push daily  glucagon  Injectable 1 milliGRAM(s) IntraMuscular once  haloperidol IVPB 1 milliGRAM(s) IV Intermittent every 6 hours PRN  heparin  Infusion.  Unit(s)/Hr IV Continuous <Continuous>  insulin glargine Injectable (LANTUS) 5 Unit(s) SubCutaneous at bedtime  insulin lispro (ADMELOG) corrective regimen sliding scale   SubCutaneous three times a day before meals  meropenem  IVPB 1000 milliGRAM(s) IV Intermittent every 8 hours  methylPREDNISolone sodium succinate Injectable 10 milliGRAM(s) IV Push daily  mupirocin 2% Nasal 1 Application(s) Both Nostrils two times a day  QUEtiapine 25 milliGRAM(s) Oral at bedtime  vancomycin  IVPB 1000 milliGRAM(s) IV Intermittent every 12 hours      T(C): 36.6 (22 @ 08:00), Max: 36.9 (22 @ 16:00)  HR: 100 (22 @ 12:00) (52 - 102)  BP: 169/72 (22 @ 08:00) (120/56 - 169/72)  RR: 36 (22 @ 12:00) (14 - 36)  SpO2: 97% (22 @ 12:00) (92% - 100%)  Wt(kg): --Vital Signs Last 24 Hrs  T(C): 36.6 (2022 08:00), Max: 36.9 (2022 16:00)  T(F): 97.8 (2022 08:00), Max: 98.4 (2022 16:00)  HR: 100 (2022 12:00) (52 - 102)  BP: 169/72 (2022 08:00) (120/56 - 169/72)  BP(mean): 104 (2022 08:00) (80 - 117)  RR: 36 (2022 12:00) (14 - 36)  SpO2: 97% (2022 12:00) (92% - 100%)    PHYSICAL EXAM:  GENERAL: NAD, well-groomed, well-developed  HEAD:  Atraumatic, Normocephalic  EYES: EOMI, PERRLA, conjunctiva and sclera clear  ENMT:  Moist mucous membranes  NECK: Supple, No JVD,  CHEST/LUNG: Clear to auscultation bilaterally; No rales, rhonchi, wheezing, or rubs  HEART: Regular rate and rhythm; No murmurs, rubs, or gallops  ABDOMEN: Soft, Nontender, Nondistended; Bowel sounds present  NEURO: Alert & Oriented X3  EXTREMITIES: No LE edema, no calf tenderness  LYMPH: No lymphadenopathy noted  SKIN: No rashes or lesions    Consultant(s) Notes Reviewed:  [x ] YES  [ ] NO  Care Discussed with Consultants/Other Providers [ x] YES  [ ] NO    LABS:                        9.6    16.48 )-----------( 333      ( 2022 00:32 )             32.3     07-05    145  |  109<H>  |  21  ----------------------------<  212<H>  4.0   |  26  |  0.45<L>    Ca    8.7      2022 00:32  Phos  3.4     07-05  Mg     2.4     07-05    TPro  6.3  /  Alb  3.1<L>  /  TBili  0.1<L>  /  DBili  x   /  AST  23  /  ALT  26  /  AlkPhos  85  07-05    PT/INR - ( 2022 00:32 )   PT: 13.6 sec;   INR: 1.18 ratio         PTT - ( 2022 08:02 )  PTT:81.0 sec    CAPILLARY BLOOD GLUCOSE      POCT Blood Glucose.: 78 mg/dL (2022 12:51)  POCT Blood Glucose.: 105 mg/dL (2022 05:24)  POCT Blood Glucose.: 210 mg/dL (2022 23:13)  POCT Blood Glucose.: 168 mg/dL (2022 17:45)  POCT Blood Glucose.: 163 mg/dL (2022 17:43)      ABG - ( 2022 13:21 )  pH, Arterial: 7.49  pH, Blood: x     /  pCO2: 33    /  pO2: 148   / HCO3: 25    / Base Excess: 2.0   /  SaO2: 96.2       RADIOLOGY & ADDITIONAL TESTS:    Imaging Personally Reviewed:  [x ] YES  [ ] NO      To Dos:    -f/u podiatry recs (procedural treatment vs local wound care)  -f/u fingersticks. Pt has been having elevated FS while on high dose steroids. Home regimen is 10U lantus, 4U admelog pre-prandial, ISS; while in the MICU we changed it to 5U lantus and ISS considering that pt was not fully eating. Can adjust insulin regimen while on floor MICU DOWN GRADE NOTE    Transfer to:     Physician: Dr. Doran      Patient is a 71y old  Female who presents with a chief complaint of Anaphylaxis (2022 09:56)    MICU Course: 71 year old female with PMH DM, COPD (chronic asthma), history of Chronic Adrenal Insufficiency on Chronic prednisone history of colorectal cancer s/p resection (colostomy bag), Hx of CAD, Chronic A fib on Eliquis, and tracheomalacia and multiple intubations presenting with right foot wound concerning for OM s/p wound care. Course complicated by medication induced anaphylaxis 2/2 cefepime in setting of penicillin allergy. Further complicated by V-tach with pulse after epi administration, requiring lidocaine for . Patient was intubated and sedated for hypoxia and airway protection. Pt was successfully extubated, with improving mental status on nasal cannula. MRI showed osteomyelitis and developing abscess in R foot, with wound cultures showing MRSA and proteus mirabilis. Patient was continued on meropenem and vancomycin. Podiatry is following this patient, plan for right foot incision and drainage and wound debridement tomorrow at 10:30am with Dr. Valenzuela.      Patient is hemodynamically stable and ready for transfer to medicine floor.      REVIEW OF SYSTEMS:  CONSTITUTIONAL: No fever, chills  HEENT:  No blurry vision No sinus or throat pain  NECK: No pain or stiffness  RESPIRATORY: No cough, wheezing, chills or hemoptysis; No shortness of breath  CARDIOVASCULAR: No chest pain, palpitations  GASTROINTESTINAL: No abdominal pain. No nausea, vomiting, or diarrhea  GENITOURINARY: No dysuria  NEUROLOGICAL: No HA, No focal weakness  SKIN: No itching, burning, rashes, or lesions   MUSCULOSKELETAL: No joint pain or swelling; No muscle, back, or extremity pain    MEDICATIONS:  albuterol/ipratropium for Nebulization 3 milliLiter(s) Nebulizer every 6 hours  chlorhexidine 2% Cloths 1 Application(s) Topical <User Schedule>  dextrose 5%. 1000 milliLiter(s) IV Continuous <Continuous>  dextrose 5%. 1000 milliLiter(s) IV Continuous <Continuous>  dextrose 50% Injectable 25 Gram(s) IV Push once  dextrose 50% Injectable 12.5 Gram(s) IV Push once  dextrose 50% Injectable 25 Gram(s) IV Push once  dextrose Oral Gel 15 Gram(s) Oral once PRN  famotidine Injectable 20 milliGRAM(s) IV Push daily  glucagon  Injectable 1 milliGRAM(s) IntraMuscular once  haloperidol IVPB 1 milliGRAM(s) IV Intermittent every 6 hours PRN  heparin  Infusion.  Unit(s)/Hr IV Continuous <Continuous>  insulin glargine Injectable (LANTUS) 5 Unit(s) SubCutaneous at bedtime  insulin lispro (ADMELOG) corrective regimen sliding scale   SubCutaneous three times a day before meals  meropenem  IVPB 1000 milliGRAM(s) IV Intermittent every 8 hours  methylPREDNISolone sodium succinate Injectable 10 milliGRAM(s) IV Push daily  mupirocin 2% Nasal 1 Application(s) Both Nostrils two times a day  QUEtiapine 25 milliGRAM(s) Oral at bedtime  vancomycin  IVPB 1000 milliGRAM(s) IV Intermittent every 12 hours      T(C): 36.6 (22 @ 08:00), Max: 36.9 (22 @ 16:00)  HR: 100 (22 @ 12:00) (52 - 102)  BP: 169/72 (22 @ 08:00) (120/56 - 169/72)  RR: 36 (22 @ 12:00) (14 - 36)  SpO2: 97% (22 @ 12:00) (92% - 100%)  Wt(kg): --Vital Signs Last 24 Hrs  T(C): 36.6 (2022 08:00), Max: 36.9 (2022 16:00)  T(F): 97.8 (2022 08:00), Max: 98.4 (2022 16:00)  HR: 100 (2022 12:00) (52 - 102)  BP: 169/72 (2022 08:00) (120/56 - 169/72)  BP(mean): 104 (2022 08:00) (80 - 117)  RR: 36 (2022 12:00) (14 - 36)  SpO2: 97% (2022 12:00) (92% - 100%)    PHYSICAL EXAM:  GENERAL: NAD, well-groomed, well-developed  HEAD:  Atraumatic, Normocephalic  EYES: EOMI, PERRLA, conjunctiva and sclera clear  ENMT:  Moist mucous membranes  NECK: Supple, No JVD,  CHEST/LUNG: Clear to auscultation bilaterally; No rales, rhonchi, wheezing, or rubs  HEART: Regular rate and rhythm; No murmurs, rubs, or gallops  ABDOMEN: Soft, Nontender, Nondistended; Bowel sounds present  NEURO: Alert & Oriented X3  EXTREMITIES: No LE edema, no calf tenderness  LYMPH: No lymphadenopathy noted  SKIN: No rashes or lesions    Consultant(s) Notes Reviewed:  [x ] YES  [ ] NO  Care Discussed with Consultants/Other Providers [ x] YES  [ ] NO    LABS:                        9.6    16.48 )-----------( 333      ( 2022 00:32 )             32.3     07-05    145  |  109<H>  |  21  ----------------------------<  212<H>  4.0   |  26  |  0.45<L>    Ca    8.7      2022 00:32  Phos  3.4     07-05  Mg     2.4     07-05    TPro  6.3  /  Alb  3.1<L>  /  TBili  0.1<L>  /  DBili  x   /  AST  23  /  ALT  26  /  AlkPhos  85  07-05    PT/INR - ( 2022 00:32 )   PT: 13.6 sec;   INR: 1.18 ratio         PTT - ( 2022 08:02 )  PTT:81.0 sec    CAPILLARY BLOOD GLUCOSE      POCT Blood Glucose.: 78 mg/dL (2022 12:51)  POCT Blood Glucose.: 105 mg/dL (2022 05:24)  POCT Blood Glucose.: 210 mg/dL (2022 23:13)  POCT Blood Glucose.: 168 mg/dL (2022 17:45)  POCT Blood Glucose.: 163 mg/dL (2022 17:43)      ABG - ( 2022 13:21 )  pH, Arterial: 7.49  pH, Blood: x     /  pCO2: 33    /  pO2: 148   / HCO3: 25    / Base Excess: 2.0   /  SaO2: 96.2       RADIOLOGY & ADDITIONAL TESTS:    Imaging Personally Reviewed:  [x ] YES  [ ] NO      To Dos:    -f/u podiatry recs after wound debridement tomorrow  -f/u fingersticks. Pt has been having elevated FS while on high dose steroids. Home regimen is 10U lantus, 4U admelog pre-prandial, ISS; while in the MICU we changed it to 5U lantus and ISS considering that pt was not fully eating. Can adjust insulin regimen while on floor MICU DOWN GRADE NOTE    Transfer to:     Physician: Dr. Doran      Patient is a 71y old  Female who presents with a chief complaint of Anaphylaxis (2022 09:56)    MICU Course: 71 year old female with PMH DM, COPD (chronic asthma), history of Chronic Adrenal Insufficiency on Chronic prednisone history of colorectal cancer s/p resection (colostomy bag), Hx of CAD, Chronic A fib on Eliquis, and tracheomalacia and multiple intubations presenting with right foot wound concerning for OM s/p wound care. Course complicated by medication induced anaphylaxis 2/2 cefepime in setting of penicillin allergy. Further complicated by V-tach with pulse after epi administration, requiring lidocaine for . Patient was intubated and sedated for hypoxia and airway protection. Pt was successfully extubated, with improving mental status on nasal cannula. MRI showed osteomyelitis and developing abscess in R foot, with wound cultures showing MRSA and proteus mirabilis. Patient was continued on meropenem and vancomycin. Podiatry is following this patient, she is s/p right foot incision and drainage and wound debridement  with Dr. Valenzuela.    Patient is hemodynamically stable and ready for transfer to medicine floor.      MEDICATIONS:  albuterol/ipratropium for Nebulization 3 milliLiter(s) Nebulizer every 6 hours  chlorhexidine 2% Cloths 1 Application(s) Topical <User Schedule>  dextrose 5%. 1000 milliLiter(s) IV Continuous <Continuous>  dextrose 5%. 1000 milliLiter(s) IV Continuous <Continuous>  dextrose 50% Injectable 25 Gram(s) IV Push once  dextrose 50% Injectable 12.5 Gram(s) IV Push once  dextrose 50% Injectable 25 Gram(s) IV Push once  dextrose Oral Gel 15 Gram(s) Oral once PRN  famotidine Injectable 20 milliGRAM(s) IV Push daily  glucagon  Injectable 1 milliGRAM(s) IntraMuscular once  haloperidol IVPB 1 milliGRAM(s) IV Intermittent every 6 hours PRN  heparin  Infusion.  Unit(s)/Hr IV Continuous <Continuous>  insulin glargine Injectable (LANTUS) 5 Unit(s) SubCutaneous at bedtime  insulin lispro (ADMELOG) corrective regimen sliding scale   SubCutaneous three times a day before meals  meropenem  IVPB 1000 milliGRAM(s) IV Intermittent every 8 hours  methylPREDNISolone sodium succinate Injectable 10 milliGRAM(s) IV Push daily  mupirocin 2% Nasal 1 Application(s) Both Nostrils two times a day  QUEtiapine 25 milliGRAM(s) Oral at bedtime  vancomycin  IVPB 1000 milliGRAM(s) IV Intermittent every 12 hours      T(C): 36.6 (22 @ 08:00), Max: 36.9 (22 @ 16:00)  HR: 100 (22 @ 12:00) (52 - 102)  BP: 169/72 (22 @ 08:00) (120/56 - 169/72)  RR: 36 (22 @ 12:00) (14 - 36)  SpO2: 97% (22 @ 12:00) (92% - 100%)  Wt(kg): --Vital Signs Last 24 Hrs  T(C): 36.6 (2022 08:00), Max: 36.9 (2022 16:00)  T(F): 97.8 (2022 08:00), Max: 98.4 (2022 16:00)  HR: 100 (2022 12:00) (52 - 102)  BP: 169/72 (2022 08:00) (120/56 - 169/72)  BP(mean): 104 (2022 08:00) (80 - 117)  RR: 36 (2022 12:00) (14 - 36)  SpO2: 97% (2022 12:00) (92% - 100%)      Consultant(s) Notes Reviewed:  [x ] YES  [ ] NO  Care Discussed with Consultants/Other Providers [ x] YES  [ ] NO    LABS:                        9.6    10.87 )-----------( 327      ( 2022 22:52 )             32.1     07-06    139  |  103  |  13  ----------------------------<  306<H>  4.2   |  25  |  0.54    Ca    8.3<L>      2022 17:00  Phos  3.1     07-06  Mg     2.3     07-06    TPro  6.5  /  Alb  3.4  /  TBili  0.3  /  DBili  x   /  AST  39  /  ALT  33  /  AlkPhos  87  07-06    PT/INR - ( 2022 00:18 )   PT: 13.7 sec;   INR: 1.18 ratio         PTT - ( 2022 22:52 )  PTT:>200.0 sec    RADIOLOGY & ADDITIONAL TESTS:    Imaging Personally Reviewed:  [x ] YES  [ ] NO      To Dos:    -f/u podiatry recommendations   -f/u fingersticks. Pt has been having elevated FS while on high dose steroids. Home regimen is 10U lantus, 4U admelog pre-prandial, ISS; while in the MICU we changed it to 5U lantus and ISS considering that pt was not fully eating. Can adjust insulin regimen while on floor  -continue Antibiotics, f/u vanc trough pre-4th dose MICU DOWN GRADE NOTE    Transfer to:     Physician: Dr. Doran      Patient is a 71y old  Female who presents with a chief complaint of Anaphylaxis (2022 09:56)    MICU Course: 71 year old female with PMH DM, COPD (chronic asthma), history of Chronic Adrenal Insufficiency on Chronic prednisone history of colorectal cancer s/p resection (colostomy bag), Hx of CAD, Chronic A fib on Eliquis, and tracheomalacia and multiple intubations presenting with right foot wound concerning for OM s/p wound care. Course complicated by medication induced anaphylaxis 2/2 cefepime in setting of penicillin allergy. Further complicated by V-tach with pulse after epi administration, requiring lidocaine for . Patient was intubated and sedated for hypoxia and airway protection. Pt was successfully extubated, with improving mental status on nasal cannula. MRI showed osteomyelitis and developing abscess in R foot, with wound cultures showing MRSA and proteus mirabilis. Patient was continued on meropenem and vancomycin. Podiatry is following this patient, she is s/p right foot incision and drainage and wound debridement  with Dr. Valenzuela.    Patient is hemodynamically stable and ready for transfer to medicine floor.      MEDICATIONS:  albuterol/ipratropium for Nebulization 3 milliLiter(s) Nebulizer every 6 hours  chlorhexidine 2% Cloths 1 Application(s) Topical <User Schedule>  dextrose 5%. 1000 milliLiter(s) IV Continuous <Continuous>  dextrose 5%. 1000 milliLiter(s) IV Continuous <Continuous>  dextrose 50% Injectable 25 Gram(s) IV Push once  dextrose 50% Injectable 12.5 Gram(s) IV Push once  dextrose 50% Injectable 25 Gram(s) IV Push once  dextrose Oral Gel 15 Gram(s) Oral once PRN  famotidine Injectable 20 milliGRAM(s) IV Push daily  glucagon  Injectable 1 milliGRAM(s) IntraMuscular once  haloperidol IVPB 1 milliGRAM(s) IV Intermittent every 6 hours PRN  heparin  Infusion.  Unit(s)/Hr IV Continuous <Continuous>  insulin glargine Injectable (LANTUS) 5 Unit(s) SubCutaneous at bedtime  insulin lispro (ADMELOG) corrective regimen sliding scale   SubCutaneous three times a day before meals  meropenem  IVPB 1000 milliGRAM(s) IV Intermittent every 8 hours  methylPREDNISolone sodium succinate Injectable 10 milliGRAM(s) IV Push daily  mupirocin 2% Nasal 1 Application(s) Both Nostrils two times a day  QUEtiapine 25 milliGRAM(s) Oral at bedtime  vancomycin  IVPB 1000 milliGRAM(s) IV Intermittent every 12 hours      T(C): 36.6 (22 @ 08:00), Max: 36.9 (22 @ 16:00)  HR: 100 (22 @ 12:00) (52 - 102)  BP: 169/72 (22 @ 08:00) (120/56 - 169/72)  RR: 36 (22 @ 12:00) (14 - 36)  SpO2: 97% (22 @ 12:00) (92% - 100%)  Wt(kg): --Vital Signs Last 24 Hrs  T(C): 36.6 (2022 08:00), Max: 36.9 (2022 16:00)  T(F): 97.8 (2022 08:00), Max: 98.4 (2022 16:00)  HR: 100 (2022 12:00) (52 - 102)  BP: 169/72 (2022 08:00) (120/56 - 169/72)  BP(mean): 104 (2022 08:00) (80 - 117)  RR: 36 (2022 12:00) (14 - 36)  SpO2: 97% (2022 12:00) (92% - 100%)      Consultant(s) Notes Reviewed:  [x ] YES  [ ] NO  Care Discussed with Consultants/Other Providers [ x] YES  [ ] NO    LABS:                        9.6    10.87 )-----------( 327      ( 2022 22:52 )             32.1     07-06    139  |  103  |  13  ----------------------------<  306<H>  4.2   |  25  |  0.54    Ca    8.3<L>      2022 17:00  Phos  3.1     07-06  Mg     2.3     07-06    TPro  6.5  /  Alb  3.4  /  TBili  0.3  /  DBili  x   /  AST  39  /  ALT  33  /  AlkPhos  87  07-06    PT/INR - ( 2022 00:18 )   PT: 13.7 sec;   INR: 1.18 ratio         PTT - ( 2022 22:52 )  PTT:>200.0 sec    RADIOLOGY & ADDITIONAL TESTS:    Imaging Personally Reviewed:  [x ] YES  [ ] NO      To Dos:    -f/u podiatry recommendations   -f/u fingersticks. Pt has been having elevated FS while on high dose steroids. Home regimen is 10U lantus, 4U admelog pre-prandial, ISS; while in the MICU we changed it to 5U lantus and ISS considering that pt was not fully eating. Can adjust insulin regimen while on floor  -continue Antibiotics, f/u vanc trough pre-4th dose  -f/u PTT while on Heparin gtt and adjust according to protocol

## 2022-07-05 NOTE — PROGRESS NOTE ADULT - ASSESSMENT
Patient is a 71 year old female with PMH DM, COPD (chronic asthma), history of Chronic Adrenal Insufficiency on Chronic prednisone history of colorectal cancer s/p resection (colostomy bag), Hx of CAD, Chronic A fib on Eliquis, and tracheomalacia and multiple intubations presenting with right foot wound concerning for OM s/p wound care. Course complicated by medication induced anaphylaxis 2/2 cefepime in setting of penicillin allergy. Further complicated by V-tach with pulse requiring lidocaine for . Now intubated and sedated for hypoxia and airway protection.     NEURO:  #Mental status: baseline non altered A&O3. Concern for confusion at home per daughter.  -Weaned off sedation for extubation today  -seroquel 25 bid for agitation; d/judi precedex. 1 mg haldol PRN IV.    CV:  #Arrythmia:   - Vtach status post epinephrine IM x 2  - most likely 2/2 sympathomimetic surge  - EKG with ST/T wave changes concerning for ischemia  - continue to telemetric monitoring    #AFib:   - Hx of Afib on Eliquis at home.   - Most recent Echo with EF 67% Moderate-severe aortic regurgitation and mild tricuspid regurgitation. No visible evidence of LV dysfunction  - Rate control: not currently on any agent  - heparin 1100 gtt for AC    PULM:  #Anaphylaxis  - Presented with facial swelling, Hypoxia, lower extremity rash s/p cefepime  - Likely 2/2 cefepime administration in setting of penicillin allergy. Possibly 2/2 to Vanc? as patient also had erythematous lower extremities  - S/P Epi x 2  - Skin checks and monitor for airway edema prior to intubation  -pt with air leak    #COPD/Asthama  - S/P Mag+ in ED  - hold home spiriva, fluticasone, symbicort  - Duonebs q6h    RENAL:  - Creatinine 0.45   - I's and O's UOP:  - Amezcua catether    GI:  #Colorectal cancer S/P resection with colostomy  - Monitor ostomy output  - Ostomy care    #Diet: TFs  #Bowel regimen: On Senna, Miralax and Lactulose at home    ENDO:  #DM2: HbA1c 2022 8%  - On Lantus 10 units at bed time and 4 units TID with meals  - Finger sticks BG Q6hrs  - adjusted solumedrol to 10 mg; pt with finger stick of 105 today; admelog held-> adjust NPH of 22?  - Will continue pregabalin diabetic neuropathy   - moderate SSI    #Adrenal insufficiency  - On Chronic steroid therapy at home Methylprednisolone 8mg QD  - S/P Stress dose steroids in the ED Solu-Cortef 100mg IV x 1  - continue methylprednisolone 10mg daily    METABOLIC:  - No addressable issues    HEMATOLOGIC:  #CBC results with baseline Anemia Hb 10.1  - Transfuse Hb < 7 and PLT <50 if bleeding, <20 if fever, < 10 at risk for spontaneous bleed  #DVT prophylaxis with heparin gtt    ID:  #Sepsis  - Most likely 2/2 lower extremity foot wound concerning for OM  - Leucocytosis with hypotension and tachycardia in setting of probably foci of infection  - F/U wound cultures  - Maintain 2 large bore IVs  - F/U BCx  - UA negative  - continue meropenem & vanc    #Foot Wound  - Right foot wound in the setting of poorly controlled diabetes  - Podiatry on board for wound care: R foot wound culture pending, R foot MRI pending, continue IV antibiotics  - F/U MRI of right foot to r/o Osteomyelitis      SKIN:   - Pod plan likely local wound care pending MR results  - F/U Wound culture   Patient is a 71 year old female with PMH DM, COPD (chronic asthma), history of Chronic Adrenal Insufficiency on Chronic prednisone history of colorectal cancer s/p resection (colostomy bag), Hx of CAD, Chronic A fib on Eliquis, and tracheomalacia and multiple intubations presenting with right foot wound concerning for OM s/p wound care. Course complicated by medication induced anaphylaxis 2/2 cefepime in setting of penicillin allergy. Further complicated by V-tach with pulse requiring lidocaine for . Now tolerating nasal cannula.     RCRI: 2 points. Class III Risk. 10.1% 30-day risk of death, MI or cardiac arrest. No further workup indicated for surgical intervention under local anesthesia and light IV sedation.     NEURO:  #Mental status: baseline non altered A&O3. Concern for confusion at home per daughter.  -pt's mental status improved today  -seroquel 25 bid for agitation; d/judi precedex. 1 mg haldol PRN IV.    CV:  #Arrythmia:   - Vtach status post epinephrine IM x 2  - most likely 2/2 sympathomimetic surge  - EKG with ST/T wave changes concerning for ischemia  - continue to telemetric monitoring    #AFib:   - Hx of Afib on Eliquis at home.   - Most recent Echo with EF 67% Moderate-severe aortic regurgitation and mild tricuspid regurgitation. No visible evidence of LV dysfunction  - Rate control: not currently on any agent  - heparin 1100 gtt for AC    PULM:  #Anaphylaxis  - Presented with facial swelling, Hypoxia, lower extremity rash s/p cefepime  > Likely 2/2 cefepime administration in setting of penicillin allergy  > S/P Epi x 2  - Pt tolerating nasal cannula, following extubation    #COPD/Asthama  - S/P Mag+ in ED  - hold home spiriva, fluticasone, symbicort  - Duonebs q6h    RENAL:  - Creatinine 0.45   - I's and O's UOP:  - Primafit    GI:  #Colorectal cancer S/aP resection with colostomy  - Monitor ostomy output  - Ostomy care    #Diet: soft and bite sized   #Bowel regimen: On Senna, Miralax and Lactulose at home    ENDO:  #DM2: HbA1c 2022 8%  -  5 lantus + premeal ISS  - Finger sticks BG Q6hrs  - adjusted solumedrol to 10 mg; pt with finger stick of 105 today; admelog held-> adjusted insulin regimen to 5 lantus + premeal ISS  - Will continue pregabalin diabetic neuropathy   - moderate SSI    #Adrenal insufficiency  - On Chronic steroid therapy at home Methylprednisolone 8mg QD  - S/P Stress dose steroids in the ED Solu-Cortef 100mg IV x 1  - continue methylprednisolone 10mg daily    METABOLIC:  - No addressable issues    HEMATOLOGIC:  #CBC results with baseline Anemia Hb 10.1  - Transfuse Hb < 7 and PLT <50 if bleeding, <20 if fever, < 10 at risk for spontaneous bleed  #DVT prophylaxis with heparin gtt    ID:  #Sepsis  - Most likely 2/2 lower extremity foot wound concerning for OM  - Leucocytosis with hypotension and tachycardia in setting of probably foci of infection  - F/U wound cultures  - Maintain 2 large bore IVs  - BCx NGTD  - UA negative  - continue meropenem & vanc    #Foot Wound  - Right foot wound in the setting of poorly controlled diabetes  - Podiatry on board for wound care:  - continue IV antibiotics  - pod plan local wound care versus surgical intervention pending discussion with family and attending, and OR availability  - MRI of R foot showing osteomyelitis and developing abscess    SKIN:   - Pod plan for local wound care vs. surgical intervention  - R foot wound culture showing MRSA and proteus mirabilis

## 2022-07-05 NOTE — CHART NOTE - NSCHARTNOTEFT_GEN_A_CORE
Patient is scheduled for right foot incision and drainage and wound debridment tomorrow at 10:30am with Dr. Valenzuela  - NPO after midnight  - please perform covid swab

## 2022-07-06 ENCOUNTER — TRANSCRIPTION ENCOUNTER (OUTPATIENT)
Age: 72
End: 2022-07-06

## 2022-07-06 LAB
ALBUMIN SERPL ELPH-MCNC: 3.4 G/DL — SIGNIFICANT CHANGE UP (ref 3.3–5)
ALP SERPL-CCNC: 87 U/L — SIGNIFICANT CHANGE UP (ref 40–120)
ALT FLD-CCNC: 33 U/L — SIGNIFICANT CHANGE UP (ref 10–45)
ANION GAP SERPL CALC-SCNC: 11 MMOL/L — SIGNIFICANT CHANGE UP (ref 5–17)
ANION GAP SERPL CALC-SCNC: 11 MMOL/L — SIGNIFICANT CHANGE UP (ref 5–17)
APTT BLD: 77.9 SEC — HIGH (ref 27.5–35.5)
AST SERPL-CCNC: 39 U/L — SIGNIFICANT CHANGE UP (ref 10–40)
BILIRUB SERPL-MCNC: 0.3 MG/DL — SIGNIFICANT CHANGE UP (ref 0.2–1.2)
BLD GP AB SCN SERPL QL: NEGATIVE — SIGNIFICANT CHANGE UP
BUN SERPL-MCNC: 12 MG/DL — SIGNIFICANT CHANGE UP (ref 7–23)
BUN SERPL-MCNC: 13 MG/DL — SIGNIFICANT CHANGE UP (ref 7–23)
CALCIUM SERPL-MCNC: 8.3 MG/DL — LOW (ref 8.4–10.5)
CALCIUM SERPL-MCNC: 9.1 MG/DL — SIGNIFICANT CHANGE UP (ref 8.4–10.5)
CHLORIDE SERPL-SCNC: 103 MMOL/L — SIGNIFICANT CHANGE UP (ref 96–108)
CHLORIDE SERPL-SCNC: 106 MMOL/L — SIGNIFICANT CHANGE UP (ref 96–108)
CO2 SERPL-SCNC: 25 MMOL/L — SIGNIFICANT CHANGE UP (ref 22–31)
CO2 SERPL-SCNC: 28 MMOL/L — SIGNIFICANT CHANGE UP (ref 22–31)
CREAT SERPL-MCNC: 0.5 MG/DL — SIGNIFICANT CHANGE UP (ref 0.5–1.3)
CREAT SERPL-MCNC: 0.54 MG/DL — SIGNIFICANT CHANGE UP (ref 0.5–1.3)
EGFR: 97 ML/MIN/1.73M2 — SIGNIFICANT CHANGE UP
EGFR: 99 ML/MIN/1.73M2 — SIGNIFICANT CHANGE UP
GLUCOSE BLDC GLUCOMTR-MCNC: 149 MG/DL — HIGH (ref 70–99)
GLUCOSE BLDC GLUCOMTR-MCNC: 191 MG/DL — HIGH (ref 70–99)
GLUCOSE BLDC GLUCOMTR-MCNC: 218 MG/DL — HIGH (ref 70–99)
GLUCOSE BLDC GLUCOMTR-MCNC: 263 MG/DL — HIGH (ref 70–99)
GLUCOSE BLDC GLUCOMTR-MCNC: 63 MG/DL — LOW (ref 70–99)
GLUCOSE SERPL-MCNC: 306 MG/DL — HIGH (ref 70–99)
GLUCOSE SERPL-MCNC: 86 MG/DL — SIGNIFICANT CHANGE UP (ref 70–99)
HCT VFR BLD CALC: 32.1 % — LOW (ref 34.5–45)
HCT VFR BLD CALC: 32.5 % — LOW (ref 34.5–45)
HGB BLD-MCNC: 9.6 G/DL — LOW (ref 11.5–15.5)
HGB BLD-MCNC: 9.8 G/DL — LOW (ref 11.5–15.5)
INR BLD: 1.18 RATIO — HIGH (ref 0.88–1.16)
MAGNESIUM SERPL-MCNC: 2.3 MG/DL — SIGNIFICANT CHANGE UP (ref 1.6–2.6)
MCHC RBC-ENTMCNC: 21 PG — LOW (ref 27–34)
MCHC RBC-ENTMCNC: 21.1 PG — LOW (ref 27–34)
MCHC RBC-ENTMCNC: 29.9 GM/DL — LOW (ref 32–36)
MCHC RBC-ENTMCNC: 30.2 GM/DL — LOW (ref 32–36)
MCV RBC AUTO: 70 FL — LOW (ref 80–100)
MCV RBC AUTO: 70.1 FL — LOW (ref 80–100)
NRBC # BLD: 0 /100 WBCS — SIGNIFICANT CHANGE UP (ref 0–0)
NRBC # BLD: 0 /100 WBCS — SIGNIFICANT CHANGE UP (ref 0–0)
PHOSPHATE SERPL-MCNC: 3.1 MG/DL — SIGNIFICANT CHANGE UP (ref 2.5–4.5)
PLATELET # BLD AUTO: 327 K/UL — SIGNIFICANT CHANGE UP (ref 150–400)
PLATELET # BLD AUTO: 373 K/UL — SIGNIFICANT CHANGE UP (ref 150–400)
POTASSIUM SERPL-MCNC: 3.5 MMOL/L — SIGNIFICANT CHANGE UP (ref 3.5–5.3)
POTASSIUM SERPL-MCNC: 4.2 MMOL/L — SIGNIFICANT CHANGE UP (ref 3.5–5.3)
POTASSIUM SERPL-SCNC: 3.5 MMOL/L — SIGNIFICANT CHANGE UP (ref 3.5–5.3)
POTASSIUM SERPL-SCNC: 4.2 MMOL/L — SIGNIFICANT CHANGE UP (ref 3.5–5.3)
PROT SERPL-MCNC: 6.5 G/DL — SIGNIFICANT CHANGE UP (ref 6–8.3)
PROTHROM AB SERPL-ACNC: 13.7 SEC — HIGH (ref 10.5–13.4)
RBC # BLD: 4.58 M/UL — SIGNIFICANT CHANGE UP (ref 3.8–5.2)
RBC # BLD: 4.64 M/UL — SIGNIFICANT CHANGE UP (ref 3.8–5.2)
RBC # FLD: 16.4 % — HIGH (ref 10.3–14.5)
RBC # FLD: 16.5 % — HIGH (ref 10.3–14.5)
RH IG SCN BLD-IMP: POSITIVE — SIGNIFICANT CHANGE UP
SARS-COV-2 RNA SPEC QL NAA+PROBE: SIGNIFICANT CHANGE UP
SODIUM SERPL-SCNC: 139 MMOL/L — SIGNIFICANT CHANGE UP (ref 135–145)
SODIUM SERPL-SCNC: 145 MMOL/L — SIGNIFICANT CHANGE UP (ref 135–145)
VANCOMYCIN TROUGH SERPL-MCNC: 12.3 UG/ML — SIGNIFICANT CHANGE UP (ref 10–20)
VANCOMYCIN TROUGH SERPL-MCNC: 58 UG/ML — CRITICAL HIGH (ref 10–20)
WBC # BLD: 10.87 K/UL — HIGH (ref 3.8–10.5)
WBC # BLD: 14.03 K/UL — HIGH (ref 3.8–10.5)
WBC # FLD AUTO: 10.87 K/UL — HIGH (ref 3.8–10.5)
WBC # FLD AUTO: 14.03 K/UL — HIGH (ref 3.8–10.5)

## 2022-07-06 PROCEDURE — 99233 SBSQ HOSP IP/OBS HIGH 50: CPT | Mod: GC

## 2022-07-06 PROCEDURE — 73620 X-RAY EXAM OF FOOT: CPT | Mod: 26,RT

## 2022-07-06 RX ORDER — SENNA PLUS 8.6 MG/1
10 TABLET ORAL AT BEDTIME
Refills: 0 | Status: DISCONTINUED | OUTPATIENT
Start: 2022-07-06 | End: 2022-07-06

## 2022-07-06 RX ORDER — OXYCODONE AND ACETAMINOPHEN 5; 325 MG/1; MG/1
1 TABLET ORAL EVERY 6 HOURS
Refills: 0 | Status: DISCONTINUED | OUTPATIENT
Start: 2022-07-06 | End: 2022-07-07

## 2022-07-06 RX ORDER — DEXTROSE 50 % IN WATER 50 %
25 SYRINGE (ML) INTRAVENOUS ONCE
Refills: 0 | Status: COMPLETED | OUTPATIENT
Start: 2022-07-06 | End: 2022-07-06

## 2022-07-06 RX ORDER — HEPARIN SODIUM 5000 [USP'U]/ML
INJECTION INTRAVENOUS; SUBCUTANEOUS
Qty: 25000 | Refills: 0 | Status: DISCONTINUED | OUTPATIENT
Start: 2022-07-06 | End: 2022-07-10

## 2022-07-06 RX ORDER — HEPARIN SODIUM 5000 [USP'U]/ML
INJECTION INTRAVENOUS; SUBCUTANEOUS
Qty: 25000 | Refills: 0 | Status: DISCONTINUED | OUTPATIENT
Start: 2022-07-06 | End: 2022-07-06

## 2022-07-06 RX ORDER — INSULIN LISPRO 100/ML
VIAL (ML) SUBCUTANEOUS AT BEDTIME
Refills: 0 | Status: DISCONTINUED | OUTPATIENT
Start: 2022-07-06 | End: 2022-07-08

## 2022-07-06 RX ORDER — VANCOMYCIN HCL 1 G
1000 VIAL (EA) INTRAVENOUS EVERY 12 HOURS
Refills: 0 | Status: DISCONTINUED | OUTPATIENT
Start: 2022-07-06 | End: 2022-07-11

## 2022-07-06 RX ORDER — SENNA PLUS 8.6 MG/1
2 TABLET ORAL AT BEDTIME
Refills: 0 | Status: DISCONTINUED | OUTPATIENT
Start: 2022-07-06 | End: 2022-07-11

## 2022-07-06 RX ORDER — POLYETHYLENE GLYCOL 3350 17 G/17G
17 POWDER, FOR SOLUTION ORAL DAILY
Refills: 0 | Status: DISCONTINUED | OUTPATIENT
Start: 2022-07-06 | End: 2022-07-11

## 2022-07-06 RX ORDER — ACETAMINOPHEN 500 MG
650 TABLET ORAL EVERY 6 HOURS
Refills: 0 | Status: DISCONTINUED | OUTPATIENT
Start: 2022-07-06 | End: 2022-07-11

## 2022-07-06 RX ADMIN — MEROPENEM 100 MILLIGRAM(S): 1 INJECTION INTRAVENOUS at 14:57

## 2022-07-06 RX ADMIN — Medication 25 MILLILITER(S): at 05:25

## 2022-07-06 RX ADMIN — Medication 3 MILLILITER(S): at 17:07

## 2022-07-06 RX ADMIN — Medication 3 MILLIGRAM(S): at 21:19

## 2022-07-06 RX ADMIN — FAMOTIDINE 20 MILLIGRAM(S): 10 INJECTION INTRAVENOUS at 11:03

## 2022-07-06 RX ADMIN — QUETIAPINE FUMARATE 25 MILLIGRAM(S): 200 TABLET, FILM COATED ORAL at 21:19

## 2022-07-06 RX ADMIN — MEROPENEM 100 MILLIGRAM(S): 1 INJECTION INTRAVENOUS at 22:41

## 2022-07-06 RX ADMIN — MEROPENEM 100 MILLIGRAM(S): 1 INJECTION INTRAVENOUS at 06:08

## 2022-07-06 RX ADMIN — OXYCODONE AND ACETAMINOPHEN 1 TABLET(S): 5; 325 TABLET ORAL at 18:00

## 2022-07-06 RX ADMIN — OXYCODONE AND ACETAMINOPHEN 1 TABLET(S): 5; 325 TABLET ORAL at 18:45

## 2022-07-06 RX ADMIN — Medication 3 MILLILITER(S): at 05:54

## 2022-07-06 RX ADMIN — MUPIROCIN 1 APPLICATION(S): 20 OINTMENT TOPICAL at 05:13

## 2022-07-06 RX ADMIN — Medication 250 MILLIGRAM(S): at 05:13

## 2022-07-06 RX ADMIN — INSULIN GLARGINE 5 UNIT(S): 100 INJECTION, SOLUTION SUBCUTANEOUS at 21:19

## 2022-07-06 RX ADMIN — SENNA PLUS 10 MILLILITER(S): 8.6 TABLET ORAL at 21:19

## 2022-07-06 RX ADMIN — MUPIROCIN 1 APPLICATION(S): 20 OINTMENT TOPICAL at 18:15

## 2022-07-06 RX ADMIN — HEPARIN SODIUM 1100 UNIT(S)/HR: 5000 INJECTION INTRAVENOUS; SUBCUTANEOUS at 16:29

## 2022-07-06 RX ADMIN — Medication 3 MILLILITER(S): at 11:00

## 2022-07-06 RX ADMIN — Medication 1: at 11:03

## 2022-07-06 RX ADMIN — Medication 250 MILLIGRAM(S): at 22:41

## 2022-07-06 RX ADMIN — Medication 2: at 18:09

## 2022-07-06 RX ADMIN — Medication 3 MILLILITER(S): at 23:30

## 2022-07-06 RX ADMIN — Medication 2: at 21:35

## 2022-07-06 RX ADMIN — OXYCODONE AND ACETAMINOPHEN 1 TABLET(S): 5; 325 TABLET ORAL at 18:40

## 2022-07-06 RX ADMIN — OXYCODONE AND ACETAMINOPHEN 1 TABLET(S): 5; 325 TABLET ORAL at 16:57

## 2022-07-06 RX ADMIN — Medication 10 MILLIGRAM(S): at 05:12

## 2022-07-06 RX ADMIN — CHLORHEXIDINE GLUCONATE 1 APPLICATION(S): 213 SOLUTION TOPICAL at 05:13

## 2022-07-06 NOTE — PROGRESS NOTE ADULT - ATTENDING COMMENTS
1. Acute respiratory failure due to anaphylaxis to cefepime.   H/o tracheomalacia. Continue steroids. H/O also of COPD vs Asthma. . Tolerating extubation.    2. Anaphylactic shock . No pressors . Decrease steroids to 10mg due to delirium. Continue solumedrol 60mg daily. Allergic to cefepime. Will need allergy consult as outpatient. Allergic to multiple antibiotics.    3. ID R ankle wound. Debrided. ? osteo. Cx pending. Continue Vancomycin and Meropenem.  Results of wound culture show MRSA and  ESBL proteus  infection. S/P OR debridement  of R ankle wound. Tolerated well.     4. Cardiac. Chronic atrial fib on Eliquis. Eliquis on hold. Heparin Drip started. Ventricular rate controlled.                   Episode of VTACH yesterday after epinephrine. Given Lidocaine. No recurrence .    5. H/o colon cancer with ostomy. Ostomy functioning well.    6. Adrenal insufficiency. Change  solumedrol to 10mg daily.  7. Delirium. Steroids decreased to 10mg daily.  Decrease Seroquel to 25 mg QHS. 1. Acute respiratory failure due to anaphylaxis to cefepime.   H/o tracheomalacia. Continue steroids. H/O also of COPD vs Asthma. Tolerating extubation.    2. Anaphylactic shock . No pressors . Decrease steroids to 10mg due to delirium. Continue solumedrol 60mg daily. Allergic to cefepime. Will need allergy consult as outpatient. Allergic to multiple antibiotics.    3. ID R ankle wound. Debrided. ? osteo. Cx pending. Continue Vancomycin and Meropenem.  Results of wound culture show MRSA and  ESBL proteus  infection. S/P OR debridement  of R ankle wound. Tolerated well.     4. Cardiac. Chronic atrial fib on Eliquis. Eliquis on hold. Heparin Drip started. Ventricular rate controlled.                   Episode of VTACH yesterday after epinephrine. Given Lidocaine. No recurrence .    5. H/o colon cancer with ostomy. Ostomy functioning well.    6. Adrenal insufficiency. Change  solumedrol to 10mg daily.  7. Delirium. Steroids decreased to 10mg daily.  Decrease Seroquel to 25 mg QHS.

## 2022-07-06 NOTE — DISCHARGE NOTE PROVIDER - NSDCMRMEDTOKEN_GEN_ALL_CORE_FT
acetaminophen 325 mg oral tablet: 2 tab(s) orally every 6 hours, As needed, Temp greater or equal to 38C (100.4F), Mild Pain (1 - 3), Moderate Pain (4 - 6)  Admelog 100 units/mL injectable solution: 4 unit(s) injectable 3 times a day (before meals)  apixaban 5 mg oral tablet: 1 tab(s) orally every 12 hours  ascorbic acid 500 mg oral tablet: 1 tab(s) orally once a day  budesonide-formoterol 160 mcg-4.5 mcg/inh inhalation aerosol: 2 puff(s) inhaled 2 times a day  Crestor 5 mg oral tablet: 1 tab(s) orally once a day (at bedtime)  fluticasone 50 mcg/inh nasal spray: 1 spray(s) nasal 2 times a day  hydrOXYzine hydrochloride 25 mg oral tablet: 1 tab(s) orally 2 times a day, As needed, Itching x 7 days  insulin glargine 100 units/mL subcutaneous solution: 10 unit(s) subcutaneous once a day (at bedtime)  ipratropium-albuterol 0.5 mg-2.5 mg/3 mL inhalation solution: 3 milliliter(s) inhaled every 6 hours  lactulose 10 g/15 mL oral syrup: 22.5 milliliter(s) orally once a day  methylPREDNISolone 8 mg oral tablet: 1 tab(s) orally once a day  mexiletine 200 mg oral capsule: 1 cap(s) orally 3 times a day  Multiple Vitamins oral tablet: 1 tab(s) orally once a day  pantoprazole 40 mg oral delayed release tablet: 1 tab(s) orally once a day (before a meal)  petrolatum topical ointment: 1 application topically 2 times a day  polyethylene glycol 3350 oral powder for reconstitution: 17 gram(s) orally 2 times a day  pregabalin 150 mg oral capsule: 1 cap(s) orally 2 times a day  senna oral tablet: 2 tab(s) orally once a day (at bedtime)  Spiriva 18 mcg inhalation capsule: 1 cap(s) inhaled once a day  zinc sulfate 220 mg oral capsule: 1 cap(s) orally once a day   acetaminophen 325 mg oral tablet: 2 tab(s) orally every 6 hours, As needed, Temp greater or equal to 38C (100.4F), Mild Pain (1 - 3), Moderate Pain (4 - 6)  Admelog 100 units/mL injectable solution: 4 unit(s) injectable 3 times a day (before meals)  apixaban 5 mg oral tablet: 1 tab(s) orally every 12 hours  ascorbic acid 500 mg oral tablet: 1 tab(s) orally once a day  budesonide-formoterol 160 mcg-4.5 mcg/inh inhalation aerosol: 2 puff(s) inhaled 2 times a day  Crestor 5 mg oral tablet: 1 tab(s) orally once a day (at bedtime)  dilTIAZem 60 mg oral tablet: 1 tab(s) orally 2 times a day  ertapenem 1 g injection:  injectable   fluticasone 50 mcg/inh nasal spray: 1 spray(s) nasal 2 times a day  hydrOXYzine hydrochloride 25 mg oral tablet: 1 tab(s) orally 2 times a day, As needed, Itching x 7 days  insulin glargine 100 units/mL subcutaneous solution: 10 unit(s) subcutaneous once a day (at bedtime)  ipratropium-albuterol 0.5 mg-2.5 mg/3 mL inhalation solution: 3 milliliter(s) inhaled every 6 hours  lactulose 10 g/15 mL oral syrup: 22.5 milliliter(s) orally once a day  methylPREDNISolone 8 mg oral tablet: 1 tab(s) orally once a day  mexiletine 200 mg oral capsule: 1 cap(s) orally 3 times a day  Multiple Vitamins oral tablet: 1 tab(s) orally once a day  pantoprazole 40 mg oral delayed release tablet: 1 tab(s) orally once a day (before a meal)  polyethylene glycol 3350 oral powder for reconstitution: 17 gram(s) orally 2 times a day  pregabalin 150 mg oral capsule: 1 cap(s) orally 2 times a day  senna oral tablet: 2 tab(s) orally once a day (at bedtime)  Spiriva 18 mcg inhalation capsule: 1 cap(s) inhaled once a day  vancomycin 1 g intravenous injection: 1 gram(s) intravenous every 12 hours  zinc sulfate 220 mg oral capsule: 1 cap(s) orally once a day   acetaminophen 325 mg oral tablet: 2 tab(s) orally every 6 hours, As needed, Temp greater or equal to 38C (100.4F), Mild Pain (1 - 3), Moderate Pain (4 - 6)  Admelog 100 units/mL injectable solution: 4 unit(s) injectable 3 times a day (before meals)  apixaban 5 mg oral tablet: 1 tab(s) orally every 12 hours  ascorbic acid 500 mg oral tablet: 1 tab(s) orally once a day  budesonide-formoterol 160 mcg-4.5 mcg/inh inhalation aerosol: 2 puff(s) inhaled 2 times a day  Crestor 5 mg oral tablet: 1 tab(s) orally once a day (at bedtime)  dilTIAZem 60 mg oral tablet: 1 tab(s) orally 2 times a day  ertapenem 1 g injection:  injectable   fluticasone 50 mcg/inh nasal spray: 1 spray(s) nasal 2 times a day  hydrOXYzine hydrochloride 25 mg oral tablet: 1 tab(s) orally 2 times a day, As needed, Itching x 7 days  insulin glargine 100 units/mL subcutaneous solution: 14 unit(s) subcutaneous once a day (at bedtime)  ipratropium-albuterol 0.5 mg-2.5 mg/3 mL inhalation solution: 3 milliliter(s) inhaled every 6 hours  lactulose 10 g/15 mL oral syrup: 22.5 milliliter(s) orally once a day  methylPREDNISolone 8 mg oral tablet: 1 tab(s) orally once a day  mexiletine 200 mg oral capsule: 1 cap(s) orally 3 times a day  Multiple Vitamins oral tablet: 1 tab(s) orally once a day  pantoprazole 40 mg oral delayed release tablet: 1 tab(s) orally once a day (before a meal)  polyethylene glycol 3350 oral powder for reconstitution: 17 gram(s) orally 2 times a day  pregabalin 150 mg oral capsule: 1 cap(s) orally 2 times a day  senna oral tablet: 2 tab(s) orally once a day (at bedtime)  Spiriva 18 mcg inhalation capsule: 1 cap(s) inhaled once a day  vancomycin 1 g intravenous injection: 1 gram(s) intravenous every 12 hours  zinc sulfate 220 mg oral capsule: 1 cap(s) orally once a day   Admelog 100 units/mL injectable solution: 4 unit(s) injectable 3 times a day (before meals)  apixaban 5 mg oral tablet: 1 tab(s) orally every 12 hours  ascorbic acid 500 mg oral tablet: 1 tab(s) orally once a day  budesonide-formoterol 160 mcg-4.5 mcg/inh inhalation aerosol: 2 puff(s) inhaled 2 times a day  Crestor 5 mg oral tablet: 1 tab(s) orally once a day (at bedtime)  dilTIAZem 60 mg oral tablet: 1 tab(s) orally 2 times a day  ertapenem 1 g injection:  injectable   fluticasone 50 mcg/inh nasal spray: 1 spray(s) nasal 2 times a day  hydrOXYzine hydrochloride 25 mg oral tablet: 1 tab(s) orally 2 times a day, As needed, Itching x 7 days  insulin glargine 100 units/mL subcutaneous solution: 14 unit(s) subcutaneous once a day (at bedtime)  ipratropium-albuterol 0.5 mg-2.5 mg/3 mL inhalation solution: 3 milliliter(s) inhaled every 6 hours  lactulose 10 g/15 mL oral syrup: 22.5 milliliter(s) orally once a day  lisinopril 5 mg oral tablet: 1 tab(s) orally once a day  methylPREDNISolone 8 mg oral tablet: 1 tab(s) orally once a day  mexiletine 200 mg oral capsule: 1 cap(s) orally 3 times a day  Multiple Vitamins oral tablet: 1 tab(s) orally once a day  oxycodone-acetaminophen 5 mg-325 mg oral tablet: 1 tab(s) orally every 6 hours, As needed, Severe Pain (7 - 10)  pantoprazole 40 mg oral delayed release tablet: 1 tab(s) orally once a day (before a meal)  polyethylene glycol 3350 oral powder for reconstitution: 17 gram(s) orally 2 times a day  pregabalin 150 mg oral capsule: 1 cap(s) orally 2 times a day  senna oral tablet: 2 tab(s) orally once a day (at bedtime)  Spiriva 18 mcg inhalation capsule: 1 cap(s) inhaled once a day  vancomycin 1 g intravenous injection: 1 gram(s) intravenous every 12 hours  zinc sulfate 220 mg oral capsule: 1 cap(s) orally once a day   Admelog 100 units/mL injectable solution: 9 unit(s) injectable 3 times a day (before meals)  apixaban 5 mg oral tablet: 1 tab(s) orally every 12 hours  ascorbic acid 500 mg oral tablet: 1 tab(s) orally once a day  budesonide-formoterol 160 mcg-4.5 mcg/inh inhalation aerosol: 2 puff(s) inhaled 2 times a day  Crestor 5 mg oral tablet: 1 tab(s) orally once a day (at bedtime)  dilTIAZem 60 mg oral tablet: 1 tab(s) orally 2 times a day  ertapenem 1 g injection: 1 gram(s) intravenous once a day  fluticasone 50 mcg/inh nasal spray: 1 spray(s) nasal 2 times a day  hydrOXYzine hydrochloride 25 mg oral tablet: 1 tab(s) orally 2 times a day, As needed, Itching x 7 days  insulin glargine 100 units/mL subcutaneous solution: 15 unit(s) subcutaneous once a day (at bedtime)  ipratropium-albuterol 0.5 mg-2.5 mg/3 mL inhalation solution: 3 milliliter(s) inhaled every 6 hours  lactulose 10 g/15 mL oral syrup: 22.5 milliliter(s) orally once a day  lisinopril 5 mg oral tablet: 1 tab(s) orally once a day  methylPREDNISolone 8 mg oral tablet: 1 tab(s) orally once a day  mexiletine 200 mg oral capsule: 1 cap(s) orally 3 times a day  Multiple Vitamins oral tablet: 1 tab(s) orally once a day  oxycodone-acetaminophen 5 mg-325 mg oral tablet: 1 tab(s) orally every 6 hours, As needed, Severe Pain (7 - 10)  pantoprazole 40 mg oral delayed release tablet: 1 tab(s) orally once a day (before a meal)  polyethylene glycol 3350 oral powder for reconstitution: 17 gram(s) orally 2 times a day  pregabalin 150 mg oral capsule: 1 cap(s) orally 2 times a day  senna oral tablet: 2 tab(s) orally once a day (at bedtime)  Spiriva 18 mcg inhalation capsule: 1 cap(s) inhaled once a day  vancomycin 1 g intravenous injection: 1 gram(s) intravenous every 12 hours  zinc sulfate 220 mg oral capsule: 1 cap(s) orally once a day

## 2022-07-06 NOTE — BRIEF OPERATIVE NOTE - NSICDXBRIEFPOSTOP_GEN_ALL_CORE_FT
POST-OP DIAGNOSIS:  Abscess of foot 06-Jul-2022 14:32:11  Ricardo White  Osteomyelitis of ankle or foot, acute 06-Jul-2022 14:32:42  Ricardo White

## 2022-07-06 NOTE — DISCHARGE NOTE PROVIDER - HOSPITAL COURSE
Pt was admitted on  with right foot wound concerning for osteomyelitis and abscess. Hospital course complicated by anaphylaxis 2/2 cefepime administration as well as episode of Vtach following epi administration requiring lidocaine for . Pt spend - in MICU intubated and sedated, and was successfully extubated on . R foot MRI concerning for osteomyelitis. On  pt underwent R foot I&D and wound debridement by podiatry team. Pt treated with IV Vancomycin and Meropenem, which was switched to Ertapenem on  per ID recommendations.     Pt transferred to [facility] on  where she continued to receive IV antibiotics. PICC line placement pending. *** Patient is cleared by podiatry for d/c to [facility] with 6 weeks of vancomycin 1g BID and ertapenem 1g qd with weekly CBC, CMP, and vanc trough. Pt was admitted on  with right foot wound concerning for osteomyelitis and abscess. Hospital course complicated by anaphylaxis 2/2 cefepime administration as well as episode of Vtach following epi administration requiring lidocaine for . Pt spend - in MICU intubated and sedated, and was successfully extubated on . R foot MRI concerning for osteomyelitis. On  pt underwent R foot I&D and wound debridement by podiatry team. Pt treated with IV Vancomycin and Meropenem, which was switched to Ertapenem on  per ID recommendations.     Pt transferred to [facility] on  where she continued to receive IV antibiotics. Wound care for stage 2 sacral ulcer given. Supportive care for chronic cough provided. Patient is cleared by podiatry for d/c to [facility] with 6 weeks of vancomycin 1g BID and ertapenem 1g qd with weekly CBC, CMP, and vanc trough. Pt already has Mediport and will use this to receive IV Abx, thus not requiring PICC placement.

## 2022-07-06 NOTE — DISCHARGE NOTE PROVIDER - NSDCFUSCHEDAPPT_GEN_ALL_CORE_FT
Huma Gray  [facility]  WOUNDCARE [address]  Scheduled Appointment: 07/12/2022    Eulalio Allison  [facility]  PULMMED [address] [facility]  Scheduled Appointment: 07/13/2022    Rohit Castro  [facility]  WOUNDCARE [address]  Scheduled Appointment: 07/14/2022    [place]els  [facility]  Med GenInt [address] [facility]  Scheduled Appointment: 07/26/2022    Genesis Aragon  [facility]  Cardio Electro [id number]  Scheduled Appointment: 07/28/2022    Rico Glover  [facility]  Cardio [phone]  Scheduled Appointment: 07/28/2022

## 2022-07-06 NOTE — PROGRESS NOTE ADULT - ASSESSMENT
Patient is a 71 year old female with PMH DM, COPD (chronic asthma), history of Chronic Adrenal Insufficiency on Chronic prednisone history of colorectal cancer s/p resection (colostomy bag), Hx of CAD, Chronic A fib on Eliquis, and tracheomalacia and multiple intubations presenting with right foot wound concerning for OM s/p wound care. Course complicated by medication induced anaphylaxis 2/2 cefepime in setting of penicillin allergy. Further complicated by V-tach with pulse requiring lidocaine for . Now tolerating nasal cannula.     RCRI: 2 points. Class III Risk. 10.1% 30-day risk of death, MI or cardiac arrest. No further workup indicated for surgical intervention under local anesthesia and light IV sedation.     NEURO:  #Mental status: baseline non altered A&O3. Concern for confusion at home per daughter.  -pt's mental status improved today  -seroquel 25 bid for agitation; d/judi precedex. 1 mg haldol PRN IV.    CV:  #Arrythmia:   - Vtach status post epinephrine IM x 2  - most likely 2/2 sympathomimetic surge  - EKG with ST/T wave changes concerning for ischemia  - continue to telemetric monitoring    #AFib:   - Hx of Afib on Eliquis at home.   - Most recent Echo with EF 67% Moderate-severe aortic regurgitation and mild tricuspid regurgitation. No visible evidence of LV dysfunction  - Rate control: not currently on any agent  - heparin 1100 gtt for AC    PULM:  #Anaphylaxis  - Presented with facial swelling, Hypoxia, lower extremity rash s/p cefepime  > Likely 2/2 cefepime administration in setting of penicillin allergy  > S/P Epi x 2  - Pt tolerating nasal cannula, following extubation    #COPD/Asthama  - S/P Mag+ in ED  - hold home spiriva, fluticasone, symbicort  - Duonebs q6h    RENAL:  - Creatinine 0.45   - I's and O's UOP:  - Primafit    GI:  #Colorectal cancer S/aP resection with colostomy  - Monitor ostomy output  - Ostomy care    #Diet: soft and bite sized   #Bowel regimen: On Senna, Miralax and Lactulose at home    ENDO:  #DM2: HbA1c 2022 8%  -  5 lantus + premeal ISS  - Finger sticks BG Q6hrs  - adjusted solumedrol to 10 mg; pt with finger stick of 105 today; admelog held-> adjusted insulin regimen to 5 lantus + premeal ISS  - Will continue pregabalin diabetic neuropathy   - moderate SSI    #Adrenal insufficiency  - On Chronic steroid therapy at home Methylprednisolone 8mg QD  - S/P Stress dose steroids in the ED Solu-Cortef 100mg IV x 1  - continue methylprednisolone 10mg daily    METABOLIC:  - No addressable issues    HEMATOLOGIC:  #CBC results with baseline Anemia Hb 10.1  - Transfuse Hb < 7 and PLT <50 if bleeding, <20 if fever, < 10 at risk for spontaneous bleed  #DVT prophylaxis with heparin gtt    ID:  #Sepsis  - Most likely 2/2 lower extremity foot wound concerning for OM  - Leucocytosis with hypotension and tachycardia in setting of probably foci of infection  - F/U wound cultures  - Maintain 2 large bore IVs  - BCx NGTD  - UA negative  - continue meropenem & vanc    #Foot Wound  - Right foot wound in the setting of poorly controlled diabetes  - Podiatry on board for wound care:  - continue IV antibiotics  - pod plan local wound care versus surgical intervention pending discussion with family and attending, and OR availability  - MRI of R foot showing osteomyelitis and developing abscess    SKIN:   - Pod plan for local wound care vs. surgical intervention  - R foot wound culture showing MRSA and proteus mirabilis

## 2022-07-06 NOTE — DISCHARGE NOTE PROVIDER - CARE PROVIDER_API CALL
Alberto Love (DPM)  Podiatric Medicine and Surgery  [address] [place] [address]  Phone: [phone]  Fax: [phone]  Follow Up Time:

## 2022-07-06 NOTE — BRIEF OPERATIVE NOTE - NSICDXBRIEFPROCEDURE_GEN_ALL_CORE_FT
PROCEDURES:  Incision and drainage, bursa, foot 06-Jul-2022 14:30:46  Ricardo White  Bone debridement 06-Jul-2022 14:31:36  Ricardo White

## 2022-07-06 NOTE — BRIEF OPERATIVE NOTE - NSICDXBRIEFPREOP_GEN_ALL_CORE_FT
PRE-OP DIAGNOSIS:  Abscess of foot 06-Jul-2022 14:24:43  Ricardo White  Foot osteomyelitis 06-Jul-2022 14:28:35  Ricardo White

## 2022-07-06 NOTE — PROGRESS NOTE ADULT - SUBJECTIVE AND OBJECTIVE BOX
Podiatry Pager #: [phone]    Patient is a 71y old  Female who presents with a chief complaint of Anaphylaxis (05 Jul 2022 09:56)      INTERVAL HPI/OVERNIGHT EVENTS:   Pt is scheduled for right foot incision and drainage and wound debridment with bone biopsy with Dr. Valenzuela at 10:30am. Patient is aware of procedure and is NPO since midnight.    MEDICATIONS  (STANDING):  albuterol/ipratropium for Nebulization 3 milliLiter(s) Nebulizer every 6 hours  chlorhexidine 2% Cloths 1 Application(s) Topical <User Schedule>  dextrose 5%. 1000 milliLiter(s) (100 mL/Hr) IV Continuous <Continuous>  dextrose 5%. 1000 milliLiter(s) (50 mL/Hr) IV Continuous <Continuous>  dextrose 50% Injectable 25 Gram(s) IV Push once  dextrose 50% Injectable 12.5 Gram(s) IV Push once  dextrose 50% Injectable 25 Gram(s) IV Push once  famotidine Injectable 20 milliGRAM(s) IV Push daily  glucagon  Injectable 1 milliGRAM(s) IntraMuscular once  insulin glargine Injectable (LANTUS) 5 Unit(s) SubCutaneous at bedtime  insulin lispro (ADMELOG) corrective regimen sliding scale   SubCutaneous three times a day before meals  melatonin 3 milliGRAM(s) Oral at bedtime  meropenem  IVPB 1000 milliGRAM(s) IV Intermittent every 8 hours  methylPREDNISolone sodium succinate Injectable 10 milliGRAM(s) IV Push daily  mupirocin 2% Nasal 1 Application(s) Both Nostrils two times a day  QUEtiapine 25 milliGRAM(s) Oral at bedtime  vancomycin  IVPB 1000 milliGRAM(s) IV Intermittent every 12 hours    MEDICATIONS  (PRN):  dextrose Oral Gel 15 Gram(s) Oral once PRN Blood Glucose LESS THAN 70 milliGRAM(s)/deciliter  haloperidol IVPB 1 milliGRAM(s) IV Intermittent every 6 hours PRN agitation      Allergies    aspirin (Short breath)  Avelox (Short breath; Pruritus)  cefepime (Anaphylaxis)  codeine (Short breath)  Dilaudid (Short breath)  iodine (Short breath; Swelling)  penicillin (Unknown)  shellfish (Anaphylaxis)  tetanus toxoid (Short breath)  Valium (Short breath)    Intolerances        Vital Signs Last 24 Hrs  T(C): 37 (06 Jul 2022 04:00), Max: 37.2 (05 Jul 2022 14:00)  T(F): 98.6 (06 Jul 2022 04:00), Max: 98.9 (05 Jul 2022 14:00)  HR: 91 (06 Jul 2022 07:00) (81 - 118)  BP: 164/70 (06 Jul 2022 07:00) (123/60 - 202/82)  BP(mean): 101 (06 Jul 2022 07:00) (86 - 118)  RR: 22 (06 Jul 2022 07:00) (20 - 36)  SpO2: 97% (06 Jul 2022 07:00) (94% - 100%)    LABS:                        9.8    14.03 )-----------( 373      ( 06 Jul 2022 00:18 )             32.5     07-06    145  |  106  |  12  ----------------------------<  86  3.5   |  28  |  0.50    Ca    9.1      06 Jul 2022 00:18  Phos  3.1     07-06  Mg     2.3     07-06    TPro  6.5  /  Alb  3.4  /  TBili  0.3  /  DBili  x   /  AST  39  /  ALT  33  /  AlkPhos  87  07-06    PT/INR - ( 06 Jul 2022 00:18 )   PT: 13.7 sec;   INR: 1.18 ratio         PTT - ( 06 Jul 2022 00:18 )  PTT:77.9 sec    CAPILLARY BLOOD GLUCOSE      POCT Blood Glucose.: 149 mg/dL (06 Jul 2022 06:07)  POCT Blood Glucose.: 63 mg/dL (06 Jul 2022 05:20)  POCT Blood Glucose.: 148 mg/dL (05 Jul 2022 21:19)  POCT Blood Glucose.: 118 mg/dL (05 Jul 2022 17:34)  POCT Blood Glucose.: 78 mg/dL (05 Jul 2022 12:51)      RADIOLOGY & ADDITIONAL TESTS:    Plan:   To OR today right foot incision and drainage and wound debridment with bone biopsy with Dr. Valenzuela at 10:30am.  CXR on sunrise.  EKG on sunrise.  Medical/Cardiac clearance since 7/5 and documented in chart.  Consent signed and in chart.  Procedure was explained to patient in detail. All alternatives, risks and complications were discussed. All questions answered.

## 2022-07-06 NOTE — DISCHARGE NOTE PROVIDER - NSDCFUADDAPPT_GEN_ALL_CORE_FT
Podiatry Discharge Instructions:  Follow up: Please follow up with Dr. Love/Earnest Mesa within 1 week of discharge from the hospital, please call [phone] for appointment and discuss that you recently were seen in the hospital.  Wound Care: Please leave your dressing clean dry intact until your follow up appointment.    Weight bearing: Please weight bear as tolerated in a surgical shoe to Left foot, heel weight bearing if possible.   Antibiotics: Please continue as instructed. Podiatry Discharge Instructions:  Follow up: Please follow up with Dr. Love / Dr. Slaughter within 1 week of discharge from the hospital, please call [facility] at [phone] and discuss that you recently were seen in the hospital.  Wound Care: Please leave your dressing clean dry intact until your follow up appointment.    Weight bearing: Please weight bear as toe tough only until further evaluated in the follow-up appointment   Antibiotics: Please continue as instructed.

## 2022-07-06 NOTE — PROGRESS NOTE ADULT - SUBJECTIVE AND OBJECTIVE BOX
INTERVAL HPI/OVERNIGHT EVENTS: No acute events overnight. Patient is scheduled for right foot incision and drainage and wound debridment tomorrow at 10:30am with Dr. Valenzuela    MRI showed osteomyelitis and developing abscess. Rfoot wound culture proteus and staph aureus. On meropenem and vanc for osteomyelitis.      SUBJECTIVE: Patient seen and examined at bedside. Appears comfortable. Mental status improved, smiling next to daughter.    Vital Signs Last 24 Hrs  T(C): 37 (06 Jul 2022 04:00), Max: 37.2 (05 Jul 2022 14:00)  T(F): 98.6 (06 Jul 2022 04:00), Max: 98.9 (05 Jul 2022 14:00)  HR: 91 (06 Jul 2022 07:00) (81 - 118)  BP: 164/70 (06 Jul 2022 07:00) (123/60 - 202/82)  BP(mean): 101 (06 Jul 2022 07:00) (86 - 118)  RR: 22 (06 Jul 2022 07:00) (20 - 36)  SpO2: 97% (06 Jul 2022 07:00) (94% - 100%)     I&O's Summary    05 Jul 2022 07:01  -  06 Jul 2022 07:00  --------------------------------------------------------  IN: 1047 mL / OUT: 750 mL / NET: 297 mL      POCT  Blood Glucose (07.06.22 @ 06:07)    POCT Blood Glucose.: 149 mg/dL        ECG:    PHYSICAL EXAM:    General: NAD  HEENT: NC/AT, no scleral icterus  Neck: supple  Respiratory: clear to auscultation in anterior lung fields, mechanical breath sounds  Cardiovascular: +S1/S2, RRR, no murmurs/gallops/rubs  Abdomen: soft, non-distended, bowel sounds present  Extremities: warm, no peripheral edema bilaterally  Neurological: A&Ox2-3    MEDICATIONS:  MEDICATIONS  (STANDING):  albuterol/ipratropium for Nebulization 3 milliLiter(s) Nebulizer every 6 hours  chlorhexidine 0.12% Liquid 15 milliLiter(s) Oral Mucosa every 12 hours  dexMEDEtomidine Infusion 0.2 MICROgram(s)/kG/Hr (3.18 mL/Hr) IV Continuous <Continuous>  dextrose 5%. 1000 milliLiter(s) (100 mL/Hr) IV Continuous <Continuous>  dextrose 5%. 1000 milliLiter(s) (50 mL/Hr) IV Continuous <Continuous>  dextrose 50% Injectable 25 Gram(s) IV Push once  dextrose 50% Injectable 12.5 Gram(s) IV Push once  dextrose 50% Injectable 25 Gram(s) IV Push once  famotidine Injectable 20 milliGRAM(s) IV Push daily  fentaNYL   Infusion.. 0.5 MICROgram(s)/kG/Hr (1.59 mL/Hr) IV Continuous <Continuous>  glucagon  Injectable 1 milliGRAM(s) IntraMuscular once  heparin  Infusion.  Unit(s)/Hr (11 mL/Hr) IV Continuous <Continuous>  insulin lispro (ADMELOG) corrective regimen sliding scale   SubCutaneous every 6 hours  insulin NPH human recombinant 12 Unit(s) SubCutaneous every 6 hours  meropenem  IVPB 1000 milliGRAM(s) IV Intermittent every 8 hours  methylPREDNISolone sodium succinate Injectable 20 milliGRAM(s) IV Push every 8 hours  norepinephrine Infusion 0.05 MICROgram(s)/kG/Min (5.86 mL/Hr) IV Continuous <Continuous>  propofol Infusion 15.253 MICROgram(s)/kG/Min (5.72 mL/Hr) IV Continuous <Continuous>  vancomycin  IVPB 1000 milliGRAM(s) IV Intermittent every 12 hours    MEDICATIONS  (PRN):  dextrose Oral Gel 15 Gram(s) Oral once PRN Blood Glucose LESS THAN 70 milliGRAM(s)/deciliter  fentaNYL    Injectable 25 MICROGram(s) IV Push every 15 minutes PRN Agitation      ALLERGIES:  Allergies    aspirin (Short breath)  Avelox (Short breath; Pruritus)  cefepime (Anaphylaxis)  CBC Full  -  ( 06 Jul 2022 00:18 )  WBC Count : 14.03 K/uL  RBC Count : 4.64 M/uL  Hemoglobin : 9.8 g/dL  Hematocrit : 32.5 %  Platelet Count - Automated : 373 K/uL  Mean Cell Volume : 70.0 fl  Mean Cell Hemoglobin : 21.1 pg  Mean Cell Hemoglobin Concentration : 30.2 gm/dL  Auto Neutrophil # : x  Auto Lymphocyte # : x  Auto Monocyte # : x  Auto Eosinophil # : x  Auto Basophil # : x  Auto Neutrophil % : x  Auto Lymphocyte % : x  Auto Monocyte % : x  Auto Eosinophil % : x  Auto Basophil % : x  codeine (Short breath)  Dilaudid (Short breath)  iodine (Short breath; Swelling)  penicillin (Unknown)  shellfish (Anaphylaxis)  tetanus toxoid (Short breath)  Valium (Short breath)    Intolerances      LABS:  CBC Full  -  ( 06 Jul 2022 00:18 )  WBC Count : 14.03 K/uL  RBC Count : 4.64 M/uL  Hemoglobin : 9.8 g/dL  Hematocrit : 32.5 %  Platelet Count - Automated : 373 K/uL  Mean Cell Volume : 70.0 fl  Mean Cell Hemoglobin : 21.1 pg  Mean Cell Hemoglobin Concentration : 30.2 gm/dL  Auto Neutrophil # : x  Auto Lymphocyte # : x  Auto Monocyte # : x  Auto Eosinophil # : x  Auto Basophil # : x  Auto Neutrophil % : x  Auto Lymphocyte % : x  Auto Monocyte % : x  Auto Eosinophil % : x  Auto Basophil % : x    07-06    145  |  106  |  12  ----------------------------<  86  3.5   |  28  |  0.50    Ca    9.1      06 Jul 2022 00:18  Phos  3.1     07-06  Mg     2.3     07-06    TPro  6.5  /  Alb  3.4  /  TBili  0.3  /  DBili  x   /  AST  39  /  ALT  33  /  AlkPhos  87  07-06    PT/INR - ( 06 Jul 2022 00:18 )   PT: 13.7 sec;   INR: 1.18 ratio         PTT - ( 06 Jul 2022 00:18 )  PTT:77.9 sec          RADIOLOGY & ADDITIONAL TESTS: Reviewed.   INTERVAL HPI/OVERNIGHT EVENTS: No acute events overnight. Patient is scheduled for right foot incision and drainage and wound debridment today at 10:30am with Dr. Valenzuela    MRI showed osteomyelitis and developing abscess. Rfoot wound culture proteus and staph aureus. On meropenem and vanc for osteomyelitis.      SUBJECTIVE: Patient seen and examined at bedside. Appears comfortable. Mental status improved, smiling next to daughter.    Vital Signs Last 24 Hrs  T(C): 37 (06 Jul 2022 04:00), Max: 37.2 (05 Jul 2022 14:00)  T(F): 98.6 (06 Jul 2022 04:00), Max: 98.9 (05 Jul 2022 14:00)  HR: 91 (06 Jul 2022 07:00) (81 - 118)  BP: 164/70 (06 Jul 2022 07:00) (123/60 - 202/82)  BP(mean): 101 (06 Jul 2022 07:00) (86 - 118)  RR: 22 (06 Jul 2022 07:00) (20 - 36)  SpO2: 97% (06 Jul 2022 07:00) (94% - 100%)     I&O's Summary    05 Jul 2022 07:01  -  06 Jul 2022 07:00  --------------------------------------------------------  IN: 1047 mL / OUT: 750 mL / NET: 297 mL      POCT  Blood Glucose (07.06.22 @ 06:07)    POCT Blood Glucose.: 149 mg/dL        ECG:    PHYSICAL EXAM:    General: NAD  HEENT: NC/AT, no scleral icterus  Neck: supple  Respiratory: clear to auscultation in anterior lung fields, mechanical breath sounds  Cardiovascular: +S1/S2, RRR, no murmurs/gallops/rubs  Abdomen: soft, non-distended, bowel sounds present  Extremities: warm, no peripheral edema bilaterally  Neurological: A&Ox2-3    MEDICATIONS:  MEDICATIONS  (STANDING):  albuterol/ipratropium for Nebulization 3 milliLiter(s) Nebulizer every 6 hours  chlorhexidine 0.12% Liquid 15 milliLiter(s) Oral Mucosa every 12 hours  dexMEDEtomidine Infusion 0.2 MICROgram(s)/kG/Hr (3.18 mL/Hr) IV Continuous <Continuous>  dextrose 5%. 1000 milliLiter(s) (100 mL/Hr) IV Continuous <Continuous>  dextrose 5%. 1000 milliLiter(s) (50 mL/Hr) IV Continuous <Continuous>  dextrose 50% Injectable 25 Gram(s) IV Push once  dextrose 50% Injectable 12.5 Gram(s) IV Push once  dextrose 50% Injectable 25 Gram(s) IV Push once  famotidine Injectable 20 milliGRAM(s) IV Push daily  fentaNYL   Infusion.. 0.5 MICROgram(s)/kG/Hr (1.59 mL/Hr) IV Continuous <Continuous>  glucagon  Injectable 1 milliGRAM(s) IntraMuscular once  heparin  Infusion.  Unit(s)/Hr (11 mL/Hr) IV Continuous <Continuous>  insulin lispro (ADMELOG) corrective regimen sliding scale   SubCutaneous every 6 hours  insulin NPH human recombinant 12 Unit(s) SubCutaneous every 6 hours  meropenem  IVPB 1000 milliGRAM(s) IV Intermittent every 8 hours  methylPREDNISolone sodium succinate Injectable 20 milliGRAM(s) IV Push every 8 hours  norepinephrine Infusion 0.05 MICROgram(s)/kG/Min (5.86 mL/Hr) IV Continuous <Continuous>  propofol Infusion 15.253 MICROgram(s)/kG/Min (5.72 mL/Hr) IV Continuous <Continuous>  vancomycin  IVPB 1000 milliGRAM(s) IV Intermittent every 12 hours    MEDICATIONS  (PRN):  dextrose Oral Gel 15 Gram(s) Oral once PRN Blood Glucose LESS THAN 70 milliGRAM(s)/deciliter  fentaNYL    Injectable 25 MICROGram(s) IV Push every 15 minutes PRN Agitation      ALLERGIES:  Allergies    aspirin (Short breath)  Avelox (Short breath; Pruritus)  cefepime (Anaphylaxis)  CBC Full  -  ( 06 Jul 2022 00:18 )  WBC Count : 14.03 K/uL  RBC Count : 4.64 M/uL  Hemoglobin : 9.8 g/dL  Hematocrit : 32.5 %  Platelet Count - Automated : 373 K/uL  Mean Cell Volume : 70.0 fl  Mean Cell Hemoglobin : 21.1 pg  Mean Cell Hemoglobin Concentration : 30.2 gm/dL  Auto Neutrophil # : x  Auto Lymphocyte # : x  Auto Monocyte # : x  Auto Eosinophil # : x  Auto Basophil # : x  Auto Neutrophil % : x  Auto Lymphocyte % : x  Auto Monocyte % : x  Auto Eosinophil % : x  Auto Basophil % : x  codeine (Short breath)  Dilaudid (Short breath)  iodine (Short breath; Swelling)  penicillin (Unknown)  shellfish (Anaphylaxis)  tetanus toxoid (Short breath)  Valium (Short breath)    Intolerances      LABS:  CBC Full  -  ( 06 Jul 2022 00:18 )  WBC Count : 14.03 K/uL  RBC Count : 4.64 M/uL  Hemoglobin : 9.8 g/dL  Hematocrit : 32.5 %  Platelet Count - Automated : 373 K/uL  Mean Cell Volume : 70.0 fl  Mean Cell Hemoglobin : 21.1 pg  Mean Cell Hemoglobin Concentration : 30.2 gm/dL  Auto Neutrophil # : x  Auto Lymphocyte # : x  Auto Monocyte # : x  Auto Eosinophil # : x  Auto Basophil # : x  Auto Neutrophil % : x  Auto Lymphocyte % : x  Auto Monocyte % : x  Auto Eosinophil % : x  Auto Basophil % : x    07-06    145  |  106  |  12  ----------------------------<  86  3.5   |  28  |  0.50    Ca    9.1      06 Jul 2022 00:18  Phos  3.1     07-06  Mg     2.3     07-06    TPro  6.5  /  Alb  3.4  /  TBili  0.3  /  DBili  x   /  AST  39  /  ALT  33  /  AlkPhos  87  07-06    PT/INR - ( 06 Jul 2022 00:18 )   PT: 13.7 sec;   INR: 1.18 ratio         PTT - ( 06 Jul 2022 00:18 )  PTT:77.9 sec          RADIOLOGY & ADDITIONAL TESTS: Reviewed.

## 2022-07-06 NOTE — BRIEF OPERATIVE NOTE - OPERATION/FINDINGS
2cc of fluid aspirated from the RIGHT foot plantar heel abscess, correlating MRI finding.   Resection bone from RIGHT foot Navicular was of low quality, however no intra-op evidence of purulence or infection tracking proximally. Incision opened closed.

## 2022-07-06 NOTE — DISCHARGE NOTE PROVIDER - NSDCCPCAREPLAN_GEN_ALL_CORE_FT
PRINCIPAL DISCHARGE DIAGNOSIS  Diagnosis: Foot osteomyelitis  Assessment and Plan of Treatment:       SECONDARY DISCHARGE DIAGNOSES  Diagnosis: Diabetes  Assessment and Plan of Treatment: Type 2    Diagnosis: Foot infection  Assessment and Plan of Treatment:      PRINCIPAL DISCHARGE DIAGNOSIS  Diagnosis: Foot osteomyelitis  Assessment and Plan of Treatment: You came to the hospital because you had an infection of your R foot requiring podiatry evaluation and antibiotics. Our podiatry team was consulted and they recommended starting antibiotics and scheduled debridement of your foot to clean the wound. You were started on two antibiotics, vancomycin and ertapenem, which you will continue receiving through your mediport. Please follow up with weekly blood work once you leave the hospital and with your podiatrist as well as your PCP.      SECONDARY DISCHARGE DIAGNOSES  Diagnosis: Anaphylaxis  Assessment and Plan of Treatment: You had an allergic reaction to an initial antibiotic, cefepime, that was given to treat your foot infection. You were given epinephrine to treat the allergic reaction and were taken to the medical intensive care unit for intubation and monitoring of your blood pressures due to the allergic reaction. You were eventually taken off the breathing tube and transferred to the general medicine floor when you stabiliized.

## 2022-07-06 NOTE — BRIEF OPERATIVE NOTE - COMMENTS
High concern for residual bone infection, residual soft tissue infection, or viability. Podiatry stand of point is that this Pt is a candidate for long term IV antibiotic for osteomyelitis, currently recommend Vancomycin and Cefepime, adjust with sensitivity result accordingly. Pod recommend Infectious Disease consultation.

## 2022-07-07 DIAGNOSIS — T78.2XXA ANAPHYLACTIC SHOCK, UNSPECIFIED, INITIAL ENCOUNTER: ICD-10-CM

## 2022-07-07 DIAGNOSIS — Z29.9 ENCOUNTER FOR PROPHYLACTIC MEASURES, UNSPECIFIED: ICD-10-CM

## 2022-07-07 DIAGNOSIS — E11.9 TYPE 2 DIABETES MELLITUS WITHOUT COMPLICATIONS: ICD-10-CM

## 2022-07-07 DIAGNOSIS — J45.909 UNSPECIFIED ASTHMA, UNCOMPLICATED: ICD-10-CM

## 2022-07-07 DIAGNOSIS — L98.429 NON-PRESSURE CHRONIC ULCER OF BACK WITH UNSPECIFIED SEVERITY: ICD-10-CM

## 2022-07-07 DIAGNOSIS — I48.91 UNSPECIFIED ATRIAL FIBRILLATION: ICD-10-CM

## 2022-07-07 DIAGNOSIS — E27.40 UNSPECIFIED ADRENOCORTICAL INSUFFICIENCY: ICD-10-CM

## 2022-07-07 DIAGNOSIS — I49.9 CARDIAC ARRHYTHMIA, UNSPECIFIED: ICD-10-CM

## 2022-07-07 DIAGNOSIS — Z85.048 PERSONAL HISTORY OF OTHER MALIGNANT NEOPLASM OF RECTUM, RECTOSIGMOID JUNCTION, AND ANUS: ICD-10-CM

## 2022-07-07 DIAGNOSIS — M86.9 OSTEOMYELITIS, UNSPECIFIED: ICD-10-CM

## 2022-07-07 LAB
APTT BLD: 26 SEC — LOW (ref 27.5–35.5)
APTT BLD: 34.7 SEC — SIGNIFICANT CHANGE UP (ref 27.5–35.5)
APTT BLD: >200 SEC — CRITICAL HIGH (ref 27.5–35.5)
BACTERIA SPEC CULT: ABNORMAL
CULTURE RESULTS: SIGNIFICANT CHANGE UP
GLUCOSE BLDC GLUCOMTR-MCNC: 120 MG/DL — HIGH (ref 70–99)
GLUCOSE BLDC GLUCOMTR-MCNC: 195 MG/DL — HIGH (ref 70–99)
GLUCOSE BLDC GLUCOMTR-MCNC: 305 MG/DL — HIGH (ref 70–99)
GLUCOSE BLDC GLUCOMTR-MCNC: 325 MG/DL — HIGH (ref 70–99)
GRAM STN FLD: SIGNIFICANT CHANGE UP
ORGANISM # SPEC MICROSCOPIC CNT: SIGNIFICANT CHANGE UP
SPECIMEN SOURCE: SIGNIFICANT CHANGE UP

## 2022-07-07 PROCEDURE — 99223 1ST HOSP IP/OBS HIGH 75: CPT | Mod: GC

## 2022-07-07 PROCEDURE — 99223 1ST HOSP IP/OBS HIGH 75: CPT | Mod: GC,AI

## 2022-07-07 RX ORDER — QUETIAPINE FUMARATE 200 MG/1
25 TABLET, FILM COATED ORAL AT BEDTIME
Refills: 0 | Status: DISCONTINUED | OUTPATIENT
Start: 2022-07-07 | End: 2022-07-10

## 2022-07-07 RX ORDER — INSULIN LISPRO 100/ML
4 VIAL (ML) SUBCUTANEOUS
Refills: 0 | Status: DISCONTINUED | OUTPATIENT
Start: 2022-07-07 | End: 2022-07-09

## 2022-07-07 RX ORDER — OXYCODONE AND ACETAMINOPHEN 5; 325 MG/1; MG/1
2 TABLET ORAL EVERY 6 HOURS
Refills: 0 | Status: DISCONTINUED | OUTPATIENT
Start: 2022-07-07 | End: 2022-07-09

## 2022-07-07 RX ORDER — INSULIN GLARGINE 100 [IU]/ML
10 INJECTION, SOLUTION SUBCUTANEOUS AT BEDTIME
Refills: 0 | Status: DISCONTINUED | OUTPATIENT
Start: 2022-07-07 | End: 2022-07-08

## 2022-07-07 RX ORDER — ACETAMINOPHEN 500 MG
1000 TABLET ORAL ONCE
Refills: 0 | Status: COMPLETED | OUTPATIENT
Start: 2022-07-07 | End: 2022-07-07

## 2022-07-07 RX ADMIN — HEPARIN SODIUM 1500 UNIT(S)/HR: 5000 INJECTION INTRAVENOUS; SUBCUTANEOUS at 16:15

## 2022-07-07 RX ADMIN — MEROPENEM 100 MILLIGRAM(S): 1 INJECTION INTRAVENOUS at 23:40

## 2022-07-07 RX ADMIN — MEROPENEM 100 MILLIGRAM(S): 1 INJECTION INTRAVENOUS at 14:07

## 2022-07-07 RX ADMIN — Medication 4: at 11:45

## 2022-07-07 RX ADMIN — CHLORHEXIDINE GLUCONATE 1 APPLICATION(S): 213 SOLUTION TOPICAL at 05:29

## 2022-07-07 RX ADMIN — OXYCODONE AND ACETAMINOPHEN 1 TABLET(S): 5; 325 TABLET ORAL at 11:34

## 2022-07-07 RX ADMIN — Medication 3 MILLILITER(S): at 11:49

## 2022-07-07 RX ADMIN — Medication 250 MILLIGRAM(S): at 21:56

## 2022-07-07 RX ADMIN — SENNA PLUS 2 TABLET(S): 8.6 TABLET ORAL at 21:56

## 2022-07-07 RX ADMIN — MUPIROCIN 1 APPLICATION(S): 20 OINTMENT TOPICAL at 05:29

## 2022-07-07 RX ADMIN — HEPARIN SODIUM 900 UNIT(S)/HR: 5000 INJECTION INTRAVENOUS; SUBCUTANEOUS at 04:05

## 2022-07-07 RX ADMIN — HEPARIN SODIUM 0 UNIT(S)/HR: 5000 INJECTION INTRAVENOUS; SUBCUTANEOUS at 00:36

## 2022-07-07 RX ADMIN — OXYCODONE AND ACETAMINOPHEN 2 TABLET(S): 5; 325 TABLET ORAL at 18:30

## 2022-07-07 RX ADMIN — HEPARIN SODIUM 1200 UNIT(S)/HR: 5000 INJECTION INTRAVENOUS; SUBCUTANEOUS at 09:16

## 2022-07-07 RX ADMIN — FAMOTIDINE 20 MILLIGRAM(S): 10 INJECTION INTRAVENOUS at 16:48

## 2022-07-07 RX ADMIN — Medication 10 MILLIGRAM(S): at 05:28

## 2022-07-07 RX ADMIN — OXYCODONE AND ACETAMINOPHEN 1 TABLET(S): 5; 325 TABLET ORAL at 10:57

## 2022-07-07 RX ADMIN — OXYCODONE AND ACETAMINOPHEN 2 TABLET(S): 5; 325 TABLET ORAL at 17:44

## 2022-07-07 RX ADMIN — Medication 4 UNIT(S): at 16:29

## 2022-07-07 RX ADMIN — Medication 4: at 16:29

## 2022-07-07 RX ADMIN — Medication 400 MILLIGRAM(S): at 04:05

## 2022-07-07 RX ADMIN — HEPARIN SODIUM 1500 UNIT(S)/HR: 5000 INJECTION INTRAVENOUS; SUBCUTANEOUS at 16:34

## 2022-07-07 RX ADMIN — MUPIROCIN 1 APPLICATION(S): 20 OINTMENT TOPICAL at 17:41

## 2022-07-07 RX ADMIN — MEROPENEM 100 MILLIGRAM(S): 1 INJECTION INTRAVENOUS at 05:28

## 2022-07-07 RX ADMIN — OXYCODONE AND ACETAMINOPHEN 1 TABLET(S): 5; 325 TABLET ORAL at 04:05

## 2022-07-07 RX ADMIN — HEPARIN SODIUM 1500 UNIT(S)/HR: 5000 INJECTION INTRAVENOUS; SUBCUTANEOUS at 19:41

## 2022-07-07 RX ADMIN — Medication 3 MILLILITER(S): at 17:41

## 2022-07-07 RX ADMIN — HEPARIN SODIUM 900 UNIT(S)/HR: 5000 INJECTION INTRAVENOUS; SUBCUTANEOUS at 07:29

## 2022-07-07 RX ADMIN — Medication 1: at 07:59

## 2022-07-07 RX ADMIN — HEPARIN SODIUM 900 UNIT(S)/HR: 5000 INJECTION INTRAVENOUS; SUBCUTANEOUS at 01:48

## 2022-07-07 RX ADMIN — Medication 1000 MILLIGRAM(S): at 04:20

## 2022-07-07 RX ADMIN — Medication 3 MILLILITER(S): at 05:28

## 2022-07-07 RX ADMIN — Medication 250 MILLIGRAM(S): at 09:01

## 2022-07-07 RX ADMIN — INSULIN GLARGINE 10 UNIT(S): 100 INJECTION, SOLUTION SUBCUTANEOUS at 21:56

## 2022-07-07 RX ADMIN — OXYCODONE AND ACETAMINOPHEN 1 TABLET(S): 5; 325 TABLET ORAL at 04:35

## 2022-07-07 RX ADMIN — Medication 3 MILLILITER(S): at 21:56

## 2022-07-07 NOTE — ADVANCED PRACTICE NURSE CONSULT - ASSESSMENT
Pt is on a VONTRAVELre P 500 low airloss pressure redistribution surface & is being turned & positioned as per nsg documentation. Pt is in bed, awake & alert. Pt w/ colostomy to contain stool & is continent of urine. Pt w/ right foot w/ ace wrap dressing in place. Pt states she does not like to use the Complete Care heel lift boots. Pt reports that her wound on sacral area has been there for several months but had recently closed.   The following was noted:  Sacral Stage 2 Pressure Injury measuring 2.5cm(l) x 1cm(w) red wound base w/ min serosang drainage without odor noted.

## 2022-07-07 NOTE — ADVANCED PRACTICE NURSE CONSULT - RECOMMEDATIONS
Recommendations  !) Sacral Stage 2- cleanse w/ ns, apply bordered foam dressing & change daily. Pt stated that she has used Medihoney in past but she stated that it was not effective.  2) Sween 24 to dry intact skin 2x/d  3) Waffle seat cushion when pt OOB to chair to off load & limit siting to 2 hour intervals.  4) Fadi lock blue pillows- 1 to each lower leg to off load heel- place longitudinally from below knee to heel.  Discussed w/ pt & in agreement w/ POC. Discussed w/ Staff RN Martin  Remain available as requested

## 2022-07-07 NOTE — PROGRESS NOTE ADULT - SUBJECTIVE AND OBJECTIVE BOX
MICU Course: 71 year old female with PMH DM, COPD (chronic asthma), history of Chronic Adrenal Insufficiency on Chronic prednisone history of colorectal cancer s/p resection (colostomy bag), Hx of CAD, Chronic A fib on Eliquis, and tracheomalacia and multiple intubations presenting with right foot wound concerning for OM s/p wound care. Course complicated by medication induced anaphylaxis 2/2 cefepime in setting of penicillin allergy. Further complicated by V-tach with pulse after epi administration, requiring lidocaine for . Patient was intubated and sedated for hypoxia and airway protection. Pt was successfully extubated, with improving mental status on nasal cannula. MRI showed osteomyelitis and developing abscess in R foot, with wound cultures showing MRSA and proteus mirabilis. Patient was continued on meropenem and vancomycin. Podiatry is following this patient, she is s/p right foot incision and drainage and wound debridement  with Dr. Valenzuela.    Patient is now hemodynamically stable and has been transferred to medicine floor. She was examined at bedside. ***    REVIEW OF SYSTEMS:    CONSTITUTIONAL: No weakness, fevers or chills  EYES/ENT: No visual changes;  No vertigo or throat pain   NECK: No pain or stiffness  RESPIRATORY: No cough, wheezing, hemoptysis; No shortness of breath  CARDIOVASCULAR: No chest pain or palpitations  GASTROINTESTINAL: No abdominal or epigastric pain. No nausea, vomiting, or hematemesis; No diarrhea or constipation. No melena or hematochezia.  GENITOURINARY: No dysuria, frequency or hematuria  NEUROLOGICAL: No numbness or weakness  SKIN: No itching, rashes    MEDICATIONS  (STANDING):  albuterol/ipratropium for Nebulization 3 milliLiter(s) Nebulizer every 6 hours  chlorhexidine 2% Cloths 1 Application(s) Topical <User Schedule>  dextrose 5%. 1000 milliLiter(s) (50 mL/Hr) IV Continuous <Continuous>  dextrose 5%. 1000 milliLiter(s) (100 mL/Hr) IV Continuous <Continuous>  dextrose 50% Injectable 25 Gram(s) IV Push once  dextrose 50% Injectable 12.5 Gram(s) IV Push once  dextrose 50% Injectable 25 Gram(s) IV Push once  famotidine Injectable 20 milliGRAM(s) IV Push daily  glucagon  Injectable 1 milliGRAM(s) IntraMuscular once  heparin  Infusion.  Unit(s)/Hr (11 mL/Hr) IV Continuous <Continuous>  insulin glargine Injectable (LANTUS) 5 Unit(s) SubCutaneous at bedtime  insulin lispro (ADMELOG) corrective regimen sliding scale   SubCutaneous three times a day before meals  insulin lispro (ADMELOG) corrective regimen sliding scale   SubCutaneous at bedtime  melatonin 3 milliGRAM(s) Oral at bedtime  meropenem  IVPB 1000 milliGRAM(s) IV Intermittent every 8 hours  methylPREDNISolone sodium succinate Injectable 10 milliGRAM(s) IV Push daily  mupirocin 2% Nasal 1 Application(s) Both Nostrils two times a day  polyethylene glycol 3350 17 Gram(s) Oral daily  QUEtiapine 25 milliGRAM(s) Oral at bedtime  senna 2 Tablet(s) Oral at bedtime  vancomycin  IVPB 1000 milliGRAM(s) IV Intermittent every 12 hours    MEDICATIONS  (PRN):  acetaminophen     Tablet .. 650 milliGRAM(s) Oral every 6 hours PRN Temp greater or equal to 38C (100.4F)  dextrose Oral Gel 15 Gram(s) Oral once PRN Blood Glucose LESS THAN 70 milliGRAM(s)/deciliter  oxycodone    5 mG/acetaminophen 325 mG 1 Tablet(s) Oral every 6 hours PRN Severe Pain (7 - 10)    VITALS:   T(C): 36.9 (22 @ 03:15), Max: 37.3 (22 @ 14:00)  HR: 81 (22 @ 03:15) (72 - 97)  BP: 162/69 (22 @ 03:15) (151/67 - 179/73)  RR: 18 (22 @ 03:15) (17 - 28)  SpO2: 99% (22 @ 03:15) (95% - 99%)    GENERAL: NAD, lying in bed comfortably  HEAD:  Atraumatic, normocephalic  EYES: EOMI, PERRLA, conjunctiva and sclera clear  ENT: Moist mucous membranes  NECK: Supple, no JVD  HEART: Regular rate and rhythm, no murmurs, rubs, or gallops  LUNGS: Unlabored respirations.  Clear to auscultation bilaterally, no crackles, wheezing, or rhonchi  ABDOMEN: Soft, nontender, nondistended, +BS  EXTREMITIES: 2+ peripheral pulses bilaterally. No clubbing, cyanosis, or edema  NERVOUS SYSTEM:  A&Ox3, no focal deficits   SKIN: No rashes or lesions                          9.6    10.87 )-----------( 327      ( 2022 22:52 )             32.1   07-06    139  |  103  |  13  ----------------------------<  306<H>  4.2   |  25  |  0.54    Ca    8.3<L>      2022 17:00  Phos  3.1     07-  Mg     2.3     07-06    TPro  6.5  /  Alb  3.4  /  TBili  0.3  /  DBili  x   /  AST  39  /  ALT  33  /  AlkPhos  87  07-06    PT/INR - ( 2022 00:18 )   PT: 13.7 sec;   INR: 1.18 ratio         PTT - ( 2022 22:52 )  PTT:>200.0 sec    RADIOLOGY & ADDITIONAL TESTS:     < from: Xray Foot AP + Lateral, Right (22 @ 15:08) >    INTERPRETATION:  CLINICAL INDICATION: baseline postoperative evaluation   status post right foot debridement    EXAM:  Frontal and lateral right foot from 2022 at 1508. Compared to   preoperative study from 2022.    IMPRESSION:  Postsurgical soft tissue and focal osseous changes in medial navicular   region.    No proximally tracking gas collections or focal areas of osteomyelitis.    Remainder of foot unchanged.    Also correlate with intraoperative findings.    --- End of Report ---    < from: MR Foot w/wo IV Cont, Right (22 @ 23:43) >    INTERPRETATION:  MR HINDFOOT/ANKLE WITHOUT AND WITH IV CONTRAST RIGHT    HISTORY:  Diabetic foot swelling. Concern for osteomyelitis. Wound.    TECHNIQUE:  Multiplanar MRI of the right hindfoot/ankle was performed   without and with 5.5cc cc administered Gadavist intravenous gadolinium   contrast using 10 sequences.    COMPARISON:  Right foot x-rays dated 2022 and MRI dated 2022    FINDINGS:    OSSEOUS STRUCTURES    Acute Fractures/Contusions:  None.    Chronic Fractures/Ossifications:  None.    Marrow:  Soft tissue wound is present along the medial margin of the   navicular with underlying marrow edema with mild T1 marrow replacement   and enhancement of the medial margin of the navicular concerning for   osteomyelitis.    Tarsal Coalition:  None.    LIGAMENTS    Talofibular/Calcaneofibular Ligaments:  Intact.    Tibiofibular/Syndesmotic Ligaments:  Intact.    Medial Deltoid Ligament Complex:  Intact.    Spring/Subtalar Ligaments:  Intact.    TENDONS    Posterior Tibialis/Medial Flexor Tendons:  Intact.    Peroneal Tendons:  Intact.    Extensor Tendons:  Intact.    ACHILLES TENDON  Intact.    PLANTAR FASCIA  Intact.    JOINTS    Tibiotalar Joint:  Preserved.    Subtalar Joints:  Preserved.    Intertarsal Joints:  Preserved.    Tarsometatarsal Joints:  Preserved.    SOFT TISSUES    Masses/Cysts:  None.    Musculature:  Intact.    Subcutaneous Tissues:  Small ill-defined fluid collection is present   within the plantar heel fat-pad suggesting an adventitial bursa versus   developing abscess measuring up to 1 cm in size near the calcaneal   tuberosity and plantar fascia. There is mild surrounding enhancement.   Mild subcutaneous edema is present.    NEUROVASCULAR STRUCTURES    Tarsal Tunnel:  Preserved.    IMPRESSION:  1. Soft tissue wound along the medial margin of the navicular with   changes concerning for underlying osteomyelitis of the navicular.  2. Small ill-defined subcutaneous fluid collection of the plantar heel   fat-pad that may reflect an adventitial bursa versus developing abscess   measuring up to 1.0 cm in size. Correlation is suggested for signs of   infection in this location.    --- End of Report ---      < end of copied text >           MICU Course: 71 year old female with PMH DM, COPD (chronic asthma), history of Chronic Adrenal Insufficiency on Chronic prednisone history of colorectal cancer s/p resection (colostomy bag), Hx of CAD, Chronic A fib on Eliquis, and tracheomalacia and multiple intubations presenting with right foot wound concerning for OM s/p wound care. Course complicated by medication induced anaphylaxis 2/2 cefepime in setting of penicillin allergy. Further complicated by V-tach with pulse after epi administration, requiring lidocaine for . Patient was intubated and sedated for hypoxia and airway protection. Pt was successfully extubated, with improving mental status on nasal cannula. MRI showed osteomyelitis and developing abscess in R foot, with wound cultures showing MRSA and proteus mirabilis. Patient was continued on meropenem and vancomycin. Podiatry is following this patient, she is s/p right foot incision and drainage and wound debridement  with Dr. Valenzuela.    Patient is now hemodynamically stable and has been transferred to medicine floor. She was examined at bedside. Pt subjectively reports that her right foot is in pain, and does not feel her current pain regimen is effectively addressing her pain. She additionally has several sites on her wrists and arms from tape and bandages that are uncomfortable. Pt is breathing comfortably on 3L NC and is amenable to down titrating O2. She denies fever, chills, n/v/d, CP, and shortness of breath. Right foot dressing is clean and dry with no drainage. Pt reports that her daughter is looking at [place] locations.     REVIEW OF SYSTEMS:    CONSTITUTIONAL: No weakness, fevers or chills  EYES/ENT: No visual changes;  No vertigo or throat pain   NECK: No pain or stiffness  RESPIRATORY: No cough, wheezing, hemoptysis; No shortness of breath  CARDIOVASCULAR: No chest pain or palpitations  GASTROINTESTINAL: No abdominal or epigastric pain. No nausea, vomiting, or hematemesis; No diarrhea or constipation. No melena or hematochezia.  GENITOURINARY: No dysuria, frequency or hematuria  NEUROLOGICAL: No numbness or weakness  SKIN: No itching, rashes    MEDICATIONS  (STANDING):  albuterol/ipratropium for Nebulization 3 milliLiter(s) Nebulizer every 6 hours  chlorhexidine 2% Cloths 1 Application(s) Topical <User Schedule>  dextrose 5%. 1000 milliLiter(s) (50 mL/Hr) IV Continuous <Continuous>  dextrose 5%. 1000 milliLiter(s) (100 mL/Hr) IV Continuous <Continuous>  dextrose 50% Injectable 25 Gram(s) IV Push once  dextrose 50% Injectable 12.5 Gram(s) IV Push once  dextrose 50% Injectable 25 Gram(s) IV Push once  famotidine Injectable 20 milliGRAM(s) IV Push daily  glucagon  Injectable 1 milliGRAM(s) IntraMuscular once  heparin  Infusion.  Unit(s)/Hr (11 mL/Hr) IV Continuous <Continuous>  insulin glargine Injectable (LANTUS) 5 Unit(s) SubCutaneous at bedtime  insulin lispro (ADMELOG) corrective regimen sliding scale   SubCutaneous three times a day before meals  insulin lispro (ADMELOG) corrective regimen sliding scale   SubCutaneous at bedtime  melatonin 3 milliGRAM(s) Oral at bedtime  meropenem  IVPB 1000 milliGRAM(s) IV Intermittent every 8 hours  methylPREDNISolone sodium succinate Injectable 10 milliGRAM(s) IV Push daily  mupirocin 2% Nasal 1 Application(s) Both Nostrils two times a day  polyethylene glycol 3350 17 Gram(s) Oral daily  QUEtiapine 25 milliGRAM(s) Oral at bedtime  senna 2 Tablet(s) Oral at bedtime  vancomycin  IVPB 1000 milliGRAM(s) IV Intermittent every 12 hours    MEDICATIONS  (PRN):  acetaminophen     Tablet .. 650 milliGRAM(s) Oral every 6 hours PRN Temp greater or equal to 38C (100.4F)  dextrose Oral Gel 15 Gram(s) Oral once PRN Blood Glucose LESS THAN 70 milliGRAM(s)/deciliter  oxycodone    5 mG/acetaminophen 325 mG 1 Tablet(s) Oral every 6 hours PRN Severe Pain (7 - 10)    VITALS:   T(C): 36.9 (22 @ 03:15), Max: 37.3 (22 @ 14:00)  HR: 81 (22 @ 03:15) (72 - 97)  BP: 162/69 (22 @ 03:15) (151/67 - 179/73)  RR: 18 (22 @ 03:15) (17 - 28)  SpO2: 99% (22 @ 03:15) (95% - 99%)    GENERAL: NAD, lying in bed comfortably  HEAD:  Atraumatic, normocephalic  EYES: EOMI, PERRLA, conjunctiva and sclera clear  ENT: Moist mucous membranes  NECK: Supple, no JVD  HEART: Regular rate and rhythm, no murmurs, rubs, or gallops  LUNGS: Unlabored respirations.  Coarse lung sounds on expiration. No wheezing.   ABDOMEN: Soft, nontender, nondistended, +BS  EXTREMITIES: Right foot wrapped in elastic bandage with no apparent bleeding or drainage. 2+ peripheral pulses bilaterally. No clubbing, cyanosis, or edema  NERVOUS SYSTEM:  A&Ox3, no focal deficits   SKIN: No rashes or lesions                          9.6    10.87 )-----------( 327      ( 2022 22:52 )             32.1   07-06    139  |  103  |  13  ----------------------------<  306<H>  4.2   |  25  |  0.54    Ca    8.3<L>      2022 17:00  Phos  3.1     07-06  Mg     2.3     07-06    TPro  6.5  /  Alb  3.4  /  TBili  0.3  /  DBili  x   /  AST  39  /  ALT  33  /  AlkPhos  87  07-06    PT/INR - ( 2022 00:18 )   PT: 13.7 sec;   INR: 1.18 ratio         PTT - ( 2022 22:52 )  PTT:>200.0 sec    RADIOLOGY & ADDITIONAL TESTS:     < from: Xray Foot AP + Lateral, Right (22 @ 15:08) >    INTERPRETATION:  CLINICAL INDICATION: baseline postoperative evaluation   status post right foot debridement    EXAM:  Frontal and lateral right foot from 2022 at 1508. Compared to   preoperative study from 2022.    IMPRESSION:  Postsurgical soft tissue and focal osseous changes in medial navicular   region.    No proximally tracking gas collections or focal areas of osteomyelitis.    Remainder of foot unchanged.    Also correlate with intraoperative findings.    --- End of Report ---    < from: MR Foot w/wo IV Cont, Right (22 @ 23:43) >    INTERPRETATION:  MR HINDFOOT/ANKLE WITHOUT AND WITH IV CONTRAST RIGHT    HISTORY:  Diabetic foot swelling. Concern for osteomyelitis. Wound.    TECHNIQUE:  Multiplanar MRI of the right hindfoot/ankle was performed   without and with 5.5cc cc administered Gadavist intravenous gadolinium   contrast using 10 sequences.    COMPARISON:  Right foot x-rays dated 2022 and MRI dated 2022    FINDINGS:    OSSEOUS STRUCTURES    Acute Fractures/Contusions:  None.    Chronic Fractures/Ossifications:  None.    Marrow:  Soft tissue wound is present along the medial margin of the   navicular with underlying marrow edema with mild T1 marrow replacement   and enhancement of the medial margin of the navicular concerning for   osteomyelitis.    Tarsal Coalition:  None.    LIGAMENTS    Talofibular/Calcaneofibular Ligaments:  Intact.    Tibiofibular/Syndesmotic Ligaments:  Intact.    Medial Deltoid Ligament Complex:  Intact.    Spring/Subtalar Ligaments:  Intact.    TENDONS    Posterior Tibialis/Medial Flexor Tendons:  Intact.    Peroneal Tendons:  Intact.    Extensor Tendons:  Intact.    ACHILLES TENDON  Intact.    PLANTAR FASCIA  Intact.    JOINTS    Tibiotalar Joint:  Preserved.    Subtalar Joints:  Preserved.    Intertarsal Joints:  Preserved.    Tarsometatarsal Joints:  Preserved.    SOFT TISSUES    Masses/Cysts:  None.    Musculature:  Intact.    Subcutaneous Tissues:  Small ill-defined fluid collection is present   within the plantar heel fat-pad suggesting an adventitial bursa versus   developing abscess measuring up to 1 cm in size near the calcaneal   tuberosity and plantar fascia. There is mild surrounding enhancement.   Mild subcutaneous edema is present.    NEUROVASCULAR STRUCTURES    Tarsal Tunnel:  Preserved.    IMPRESSION:  1. Soft tissue wound along the medial margin of the navicular with   changes concerning for underlying osteomyelitis of the navicular.  2. Small ill-defined subcutaneous fluid collection of the plantar heel   fat-pad that may reflect an adventitial bursa versus developing abscess   measuring up to 1.0 cm in size. Correlation is suggested for signs of   infection in this location.    --- End of Report ---      < end of copied text >

## 2022-07-07 NOTE — PHYSICAL THERAPY INITIAL EVALUATION ADULT - ADDITIONAL COMMENTS
Pt lives in a private home with elderly sister. Pt performed ADL/IADLs independently.  Ambulates with a rollator.  Owns DME: rollator. Recent hospitalizations this year, was recently dced from [facility] and was doing fine ambulating with rollator at home.

## 2022-07-07 NOTE — PROGRESS NOTE ADULT - PROBLEM SELECTOR PLAN 2
- Hospital course complicated by medication-induced anaphylaxis 2/2 cefepime in setting of PCN allergy  - Previously intubated for hypoxia and airway protection on 7/2; extubated on 7/4  - Stabilized, breathing comfortably on 3L NC (95-99% SpO2) - Hospital course complicated by medication-induced anaphylaxis 2/2 cefepime in setting of PCN allergy  - Previously intubated for hypoxia and airway protection on 7/2; extubated on 7/4  - Stabilized, breathing comfortably on room air (SpO2 96%)

## 2022-07-07 NOTE — CHART NOTE - NSCHARTNOTEFT_GEN_A_CORE
MAR Accept Note  Transfer to:  Medicine  Accepting Attending Physician:  Dr. Tres Mccurdy  Assigned Room:   [address] [place], [place] [address]    Patient seen and examined.   Labs and data reviewed.   No findings precluding transfer of service.       HPI/MICU COURSE:   Please refer to MICU transfer note for full details. Briefly, this is a 71 year old female with PMH DM, COPD (chronic asthma), history of Chronic Adrenal Insufficiency on Chronic prednisone history of colorectal cancer s/p resection (colostomy bag), Hx of CAD, Chronic A fib on Eliquis, and tracheomalacia and multiple intubations presenting with right foot wound concerning for OM s/p wound care. Course complicated by medication induced anaphylaxis 2/2 cefepime in setting of penicillin allergy. Further complicated by V-tach with pulse after epi administration, requiring lidocaine for . Patient was intubated and sedated for hypoxia and airway protection. Pt was successfully extubated, with improving mental status on nasal cannula. MRI showed osteomyelitis and developing abscess in R foot, with wound cultures showing MRSA and proteus mirabilis. Patient was continued on meropenem and vancomycin. Podiatry is following this patient, she is s/p right foot incision and drainage and wound debridement  with Dr. Valenzuela. Patient s/p right foot incision and drainage and wound debridment with bone biopsy with Dr. Valenzuela pending bone cultures.       FOR FOLLOW-UP:  [ ] F/u aPTT on heparin gtt for hx of atrial fibrillation  [ ] Consider ID consultation as podiatry recommending long term IV abx s/p right foot incision  [ ] F/u OR bone cultures, wound culture ESBL proteus and MRSA abx per primary team currently tolerating vanco and suraj.   [ ] On solumedrol 10mg given hx of adrenal insufficiency, recent anaphylaxis, and hx of tracheomalacia. Steroids recently decreased i/s/o steroid induced delirium.   [ ] Monitor Tele given recent hx of Vtach.     Tez Boothe MD  Internal Medicine PGY-3

## 2022-07-07 NOTE — PROGRESS NOTE ADULT - ASSESSMENT
71F s/p right foot navicular bone biopsy, wound debridement and heel I&D  - pt seen and evaluated  - Afebrile, WBC 10.8  - s/p right foot navicular bone biopsy, wound debridement and heel I&D, warm and viable, no pus, no SOI  - high concern residual bone infection, low concern vaibility  - patient may need possible PICC  - OR data pending  - recommend ID consult  - pod plan local wound care with IV antibitoics pending OR data, ID recs  - discussed w/ attending

## 2022-07-07 NOTE — CONSULT NOTE ADULT - ASSESSMENT
WORKUP      DIAGNOSIS and IMPRESSION      RECOMMENDATIONS    PT TO BE SEEN. PRELIM NOTE  PENDING RECS. PLEASE WAIT FOR FINAL RECS AFTER DISCUSSION WITH ATTENDING    Raymond Moon MD, PGY4   ID Fellow  [employer] Preferred  After 5pm/weekends call [phone] 70 yo F with a PMH of DM, COPD (chronic asthma), Chronic Adrenal Insufficiency on Chronic PRN, history of colorectal cancer s/p resection (colostomy bag), CAD, Chronic AF (Eliquis), tracheomalacia and multiple intubations, who initially presented with a R foot wound concerning for OM.     Pt had anaphylactic shock 2/2 Cefepime in setting of PCN allergy, was briefly intubated. Pt was placed on Merrem and Vancomycin 07/02-. R foot wound culture grew MRSA and ESBL Proteus Mirabilis. MRI R foot performed on 07/03 showed concern for navicular bone OM as well as a plantar heel abscess, subsequently pt underwent I&D of abscess as well navicular bone resection and biopsy with Podiatry on 07/06. There is high concern for residual bone infection per Podiatry, OR culture data pending    ID consulted for antibiotic recommendations. Pt states she has had anaphylaxis to PCN in past.     Recently seen by ID in April 2022 for MRSA PNA.       WORKUP  -,   -Positive MRSA Nasal PCR  -07/02 R foot wound culture with ESBL Proteus Mirabilis, MRSA, Corynebacterium  -MRI R foot 07/03 with navicular bone OM and plantar heel abscess  -07/03 ETT sputum culture with Proteus ESBL  -07/06 OR culture pending       DIAGNOSIS and IMPRESSION  #R foot navicular bone OM s/p resection  #Plantar abscess s/p I&D  #PCN Allergy (anaphylaxis) to unknown PCN in past and now Cefepime      RECOMMENDATIONS    PENDING RECS. PLEASE WAIT FOR FINAL RECS AFTER DISCUSSION WITH ATTENDING    Raymond Moon MD, PGY4   ID Fellow  [employer] Preferred  After 5pm/weekends call [phone] 70 yo F with a PMH of DM, COPD (chronic asthma), Chronic Adrenal Insufficiency on Chronic PRN, history of colorectal cancer s/p resection (colostomy bag), CAD, Chronic AF (Eliquis), tracheomalacia and multiple intubations, who initially presented with a R foot wound concerning for OM.     Pt had anaphylactic shock 2/2 Cefepime in setting of PCN allergy, was briefly intubated. Pt was placed on Merrem and Vancomycin 07/02-. R foot wound culture grew MRSA and ESBL Proteus Mirabilis. MRI R foot performed on 07/03 showed concern for navicular bone OM as well as a plantar heel abscess, subsequently pt underwent I&D of abscess as well navicular bone resection and biopsy with Podiatry on 07/06. There is high concern for residual bone infection per Podiatry, OR culture data pending    ID consulted for antibiotic recommendations. Pt states she has had anaphylaxis to PCN in past.     Recently seen by ID in April 2022 for MRSA PNA.       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 PLEASE WAIT FOR FINAL RECS AFTER DISCUSSION WITH ATTENDING    Raymond Moon MD, PGY4   ID Fellow  [employer] Preferred  After 5pm/weekends call [phone] 70 yo F with a PMH of DM, COPD (chronic asthma), Chronic Adrenal Insufficiency on Chronic PRN, history of colorectal cancer s/p resection (colostomy bag), CAD, Chronic AF (Eliquis), tracheomalacia and multiple intubations, who initially presented with a R foot wound concerning for OM.     Pt had anaphylactic shock 2/2 Cefepime in setting of PCN allergy, was briefly intubated. Pt was placed on Merrem and Vancomycin 07/02-. R foot wound culture grew MRSA and ESBL Proteus Mirabilis. MRI R foot performed on 07/03 showed concern for navicular bone OM as well as a plantar heel abscess, subsequently pt underwent I&D of abscess as well navicular bone resection and biopsy with Podiatry on 07/06. There is high concern for residual bone infection per Podiatry, OR culture data pending    ID consulted for antibiotic recommendations. Pt states she has had anaphylaxis to PCN in past.     Recently seen by ID in April 2022 for MRSA PNA.       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    Raymond Moon MD, PGY4   ID Fellow  [employer] Preferred  After 5pm/weekends call [phone]

## 2022-07-07 NOTE — PROGRESS NOTE ADULT - ATTENDING COMMENTS
#foot osteomyelitis  continue Vanc, Meropenem. check vanc trough daily  awaiting OR bone cx  ID consult to determine antibiotic duration, will likely need PICC   F/u podiatry for further recs  pain management - percocet prn, tylenol prn     #chronic adrenal insufficiency  continue solmedrol    #CAD, chronic afib  on heparin gtt for AC, plan to switch to home eliquis once PICC line is in place     #DM type 2-uncontrolled  hyperglycemia  switch to home dose insulin lantus 10u hs, admelog 4u tidacmeals. monitor fs, goal 100-180    #?hypoxia  wean supplemental oxygen  incentive spirometer    -change seroquel to prn   -PT eval

## 2022-07-07 NOTE — PROGRESS NOTE ADULT - PROBLEM SELECTOR PLAN 7
- Bowel regimen: Continue home senna, miralax, and lactulose - S/p resection with colostomy   - Monitor ostomy output  - Ostomy care - Sacral Stage 2 Pressure Injury measuring 2.5cm(l) x 1cm(w) red wound base w/ min serosang drainage without odor noted.  - Wound care recs

## 2022-07-07 NOTE — PROGRESS NOTE ADULT - ASSESSMENT
71 year old female with PMH DM, COPD (chronic asthma), history of Chronic Adrenal Insufficiency on Chronic prednisone history of colorectal cancer s/p resection (colostomy bag), Hx of CAD, Chronic A fib on Eliquis, and tracheomalacia and multiple intubations presenting with right foot wound concerning for OM s/p wound care. Hospital course complicated by medicine-induced anaphylaxis 2/2 cefepime and V-tach w/pulse after epi administration, and patient was intubated and sedated for hypoxia and airway protection. Patient has since been extubated and she is s/p right foot incision and drainage and wound debridement 7/6 with Dr. Valenzuela.      71 year old female with PMH DM, COPD (chronic asthma), history of Chronic Adrenal Insufficiency on Chronic prednisone history of colorectal cancer s/p resection (colostomy bag), Hx of CAD, Chronic A fib on Eliquis, and tracheomalacia and multiple intubations presenting with right foot wound concerning for OM s/p wound care. Hospital course complicated by medicine-induced anaphylaxis 2/2 cefepime and V-tach w/pulse after epi administration, and patient was intubated and sedated for hypoxia and airway protection. Patient has since been extubated and she is s/p right foot incision and drainage and wound debridement 7/6 with Dr. Valenzuela. This AM she is resting comfortably in bed, pleasant, and appropriate, however is reporting continued pain at procedure site.

## 2022-07-07 NOTE — PROGRESS NOTE ADULT - PROBLEM SELECTOR PLAN 8
- Bowel regimen: Continue home senna, miralax, and lactulose - S/p resection with colostomy   - Monitor ostomy output  - Ostomy care

## 2022-07-07 NOTE — PROGRESS NOTE ADULT - SUBJECTIVE AND OBJECTIVE BOX
Patient is a 71y old  Female who presents with a chief complaint of Anaphylaxis (07 Jul 2022 07:17)       INTERVAL HPI/OVERNIGHT EVENTS:  Patient seen and evaluated at bedside.  Pt is resting comfortable in NAD. Denies N/V/F/C.    Allergies    aspirin (Short breath)  Avelox (Short breath; Pruritus)  cefepime (Anaphylaxis)  codeine (Short breath)  Dilaudid (Short breath)  iodine (Short breath; Swelling)  penicillin (Unknown)  shellfish (Anaphylaxis)  tetanus toxoid (Short breath)  Valium (Short breath)    Intolerances        Vital Signs Last 24 Hrs  T(C): 36.7 (07 Jul 2022 12:00), Max: 37.3 (06 Jul 2022 14:00)  T(F): 98.1 (07 Jul 2022 12:00), Max: 99.2 (06 Jul 2022 14:00)  HR: 82 (07 Jul 2022 12:00) (72 - 97)  BP: 158/76 (07 Jul 2022 12:00) (151/67 - 178/75)  BP(mean): 87 (07 Jul 2022 02:00) (87 - 110)  RR: 18 (07 Jul 2022 12:00) (17 - 26)  SpO2: 96% (07 Jul 2022 12:00) (95% - 99%)    LABS:                        9.6    10.87 )-----------( 327      ( 06 Jul 2022 22:52 )             32.1     07-06    139  |  103  |  13  ----------------------------<  306<H>  4.2   |  25  |  0.54    Ca    8.3<L>      06 Jul 2022 17:00  Phos  3.1     07-06  Mg     2.3     07-06    TPro  6.5  /  Alb  3.4  /  TBili  0.3  /  DBili  x   /  AST  39  /  ALT  33  /  AlkPhos  87  07-06    PT/INR - ( 06 Jul 2022 00:18 )   PT: 13.7 sec;   INR: 1.18 ratio         PTT - ( 07 Jul 2022 07:34 )  PTT:26.0 sec    CAPILLARY BLOOD GLUCOSE      POCT Blood Glucose.: 305 mg/dL (07 Jul 2022 11:40)  POCT Blood Glucose.: 195 mg/dL (07 Jul 2022 07:57)  POCT Blood Glucose.: 263 mg/dL (06 Jul 2022 21:17)  POCT Blood Glucose.: 218 mg/dL (06 Jul 2022 18:05)      Lower Extremity Physical Exam:  Vascular: DP/PT 2/4 B/L, CFT <3 seconds B/L, Temperature gradient warm to cool B/L  Neuro: Epicritic sensation intact to the level of midfoot B/L  Musculoskeletal/Ortho: unremarkable   Skin: right foot medial navicular wound to bone w resolving erythema periwound, no malodor, no bogginess, no drainage noted, no tracking noted      RADIOLOGY & ADDITIONAL TESTS:

## 2022-07-07 NOTE — PROGRESS NOTE ADULT - PROBLEM SELECTOR PLAN 6
- S/p resection with colostomy   - Monitor ostomy output  - Ostomy care - Pt with productive cough since pre-admission, reports is consistent with her "usual asthma"   - No subjective difficulty breathing, satting well on RA  - Coarse lung sounds on expiration  - Thang  - Incentive spirometry

## 2022-07-07 NOTE — ADVANCED PRACTICE NURSE CONSULT - REASON FOR CONSULT
Requested by staff for skin status assessment for PI. PMH:  MICU Course: 71 year old female with PMH DM, COPD (chronic asthma), history of Chronic Adrenal Insufficiency on Chronic prednisone history of colorectal cancer s/p resection (colostomy bag), Hx of CAD, Chronic A fib on Eliquis, and tracheomalacia and multiple intubations presenting with right foot wound concerning for OM s/p wound care. Course complicated by medication induced anaphylaxis 2/2 cefepime in setting of penicillin allergy. Further complicated by V-tach with pulse after epi administration, requiring lidocaine for . Patient was intubated and sedated for hypoxia and airway protection. Pt was successfully extubated, with improving mental status on nasal cannula. MRI showed osteomyelitis and developing abscess in R foot, with wound cultures showing MRSA and proteus mirabilis. Patient was continued on meropenem and vancomycin. Podiatry is following this patient, she is s/p right foot incision and drainage and wound debridement  with Dr. Valenzuela.  Patient is now hemodynamically stable and has been transferred to medicine floor.

## 2022-07-07 NOTE — PHYSICAL THERAPY INITIAL EVALUATION ADULT - PERTINENT HX OF CURRENT PROBLEM, REHAB EVAL
71 year old female with PMH DM, COPD (chronic asthma), history of Chronic Adrenal Insufficiency on Chronic prednisone history of colorectal cancer s/p resection (colostomy bag), Hx of CAD, Chronic A fib on Eliquis, and tracheomalacia and multiple intubations presenting with right foot wound concerning for OM s/p wound care.

## 2022-07-07 NOTE — CDI QUERY NOTE - NSCDIOTHERTXTBX_GEN_ALL_CORE_HH
- pt presented for further evaluation of R ft ulcer that's not healing and need for abx and possible intervention  - Podiatry saw pt in ED and did debridement, concern for osteomyelitis so pt was started on IV Cefepime  - pt had anaphylaxis to cefepime, required intubation, course also c/b VT  - pt was admitted to MICU w/all notes documenting pt has sepsis- most likely 2/2 ft wound   - on admission: T-100.7, , BP 94/73, RR 25, O2 92% RA-->100% vent, WBC-11.7-->22.9-->17.6, pt started on IV abx  - MR of foot came back showing OM and abscess of R ft  - 7/6 pt s/p R ft I&D    Based on your judgement and the clinical findings above, please clarify the status of sepsis:    > Sepsis ruled in on admission  > Sepsis ruled out  > Other (please specify)  > Clinically undetermined

## 2022-07-07 NOTE — PROGRESS NOTE ADULT - PROBLEM SELECTOR PLAN 1
- - S/p I&D and debridement on 7/6 with podiatry  - ID recs appreciated regarding long-term abx and antibiotic optimization   - PICC line may be needed - S/p I&D and debridement of right foot osteomyelitis and abscess on 7/6 with Dr. Valenzuela and Podiatry team   - ID recommendations appreciated regarding long-term antibiotic therapy and antibiotic optimization; PICC line may be needed  - Continue vancomycin 1g  BID and meropenem 1g TID for osteomyelitis tx  - Vanc trough before every 4th dose   - Pharmacy recs appreciated re: vancomycin dosing, as most recent vanc trough lower than recommended for MRSA treatment (12.3)  - F/u tissue culture - S/p I&D and debridement of right foot osteomyelitis and abscess on 7/6 with Dr. Valenzuela and Podiatry team   - ID recommendations appreciated regarding long-term antibiotic therapy and antibiotic optimization; PICC line may be needed  - Continue vancomycin 1g  BID and meropenem 1g TID for osteomyelitis tx  - Vanc trough before every AM dose to clarify serum vanc levels, as most recent vanc trough lower than recommended for MRSA treatment (12.3)  - OR culture pending - S/p I&D and debridement of right foot osteomyelitis and abscess on 7/6 with Dr. Valenzuela and Podiatry team   - ID recommendations: pt will require 6 weeks IV Abx with vancomycin 1g q12 and ertapenem 1g qd, thus requiring PICC placement upon d/c. Start date for Abx is considered 7/6/22  -- Pt will also require weekly trough, CBC, and serum Cr while receiving IV Abx  - Continue vancomycin 1g  BID and meropenem 1g TID for osteomyelitis tx  - Vanc trough before every AM dose to clarify serum vanc levels, as most recent vanc trough lower than recommended for MRSA treatment (12.3)  - OR culture pending

## 2022-07-07 NOTE — CONSULT NOTE ADULT - SUBJECTIVE AND OBJECTIVE BOX
Patient is a 71y old  Female who presents with a chief complaint of Anaphylaxis (2022 12:38)    HPI:  70 yo F with a PMH of DM, COPD (chronic asthma), Chronic Adrenal Insufficiency on Chronic PRN, history of colorectal cancer s/p resection (colostomy bag), CAD, Chronic AF (Eliquis), tracheomalacia and multiple intubations, who initially presented with a R foot wound concerning for OM.     Pt had anaphylactic shock 2/2 Cefepime in setting of PCN allergy c/b V-tach with pulse after Epi administration requiring lidocaine for . Pt was intubated for airway protection later successfully extubated.     Pt was placed on Merrem and Vancomycin -. R foot wound culture grew MRSA and ESBL Proteus Mirabilis. MRI R foot wound performed on  showed concern for navicular bone OM as well as a plantar heel abscess. Pt underwent I&D of abscess as well navicular bone resection and biopsy with Podiatry on . There is high concern for residual bone infection. OR culture data pending    ID consulted for antibiotic recommendations     REVIEW OF SYSTEMS      prior hospital charts reviewed [V]  primary team notes reviewed [V]  other consultant notes reviewed [V]    PAST MEDICAL & SURGICAL HISTORY:  Atrial fibrillation  paroxysmal, on eliquis  Diabetes  Type 2  COPD (chronic obstructive pulmonary disease)  Adrenal insufficiency  Medrol daily for over 50 years  Aortic insufficiency  moderate AR on echo 5/3/2018  Pelvic fracture  Asthma  Tracheobronchomalacia  diagnosed , s/p bronchial thermoplasty  (Dr Zapien); recent bronchoscopy 2018 revealed no evidence of tracheobronchomalacia in trachea or bronchial tubes  Colorectal cancer  2018- last treatment , chemo and radiation  Rectal bleeding  Seizure  x 1 18  DVT (deep venous thrombosis)  15-20 years ago, took coumadin  TIA (transient ischemic attack)  multiple, last 5 years ago - presents as right-sided weakness  History of partial hysterectomy  30 years ago - fibroids  H/O total knee replacement, bilateral  5 years ago  History of sinus surgery  multiple sinus surgeries  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  H/O pelvic surgery  5 years ago - s/p fracture  History of tracheomalacia   - attempted tracheal stenting ([facility])- course complicated by obstruction, respiratory failure, multiple CPR attempts -  stent discontinued; 10/20/2016 Tracheobronchoplasty (Prolene Mesh) performed at [facility] by Dr Zapien  S/P bronchoscopy  2018 - [place] (Dr Zapien) no evidence of tracheobronchomalacia in trachea or bronchial tubes  Rectal bleeding  exam under anesthesia ([facility]) 2018    SOCIAL HISTORY:  - Denied smoking/vaping/alcohol/recreational drug use    FAMILY HISTORY:  Family history of asthma  Family history of breast cancer (Sibling)  Family history of diabetes mellitus type II    Allergies  aspirin (Short breath)  Avelox (Short breath; Pruritus)  cefepime (Anaphylaxis)  codeine (Short breath)  Dilaudid (Short breath)  iodine (Short breath; Swelling)  penicillin (Unknown)  shellfish (Anaphylaxis)  tetanus toxoid (Short breath)  Valium (Short breath)    ANTIMICROBIALS:  meropenem  IVPB 1000 every 8 hours  vancomycin  IVPB 1000 every 12 hours    ANTIMICROBIALS (past 90 days):  MEDICATIONS  (STANDING):  cefepime   IVPB   100 mL/Hr IV Intermittent (22 @ 15:54)    meropenem  IVPB   100 mL/Hr IV Intermittent (22 @ 14:07)   100 mL/Hr IV Intermittent (22 @ 05:28)   100 mL/Hr IV Intermittent (22 @ 22:41)   100 mL/Hr IV Intermittent (22 @ 14:57)   100 mL/Hr IV Intermittent (22 @ 06:08)   100 mL/Hr IV Intermittent (22 @ 22:03)   100 mL/Hr IV Intermittent (22 @ 14:10)   100 mL/Hr IV Intermittent (22 @ 06:03)   100 mL/Hr IV Intermittent (22 @ 23:12)   100 mL/Hr IV Intermittent (22 @ 14:29)   100 mL/Hr IV Intermittent (22 @ 06:06)   100 mL/Hr IV Intermittent (22 @ 00:13)   100 mL/Hr IV Intermittent (22 @ 13:25)   100 mL/Hr IV Intermittent (22 @ 05:07)   100 mL/Hr IV Intermittent (22 @ 22:32)    vancomycin  IVPB   250 mL/Hr IV Intermittent (22 @ 05:13)   250 mL/Hr IV Intermittent (22 @ 18:23)   250 mL/Hr IV Intermittent (22 @ 07:15)   250 mL/Hr IV Intermittent (22 @ 17:49)   250 mL/Hr IV Intermittent (22 @ 05:11)   250 mL/Hr IV Intermittent (22 @ 19:08)    vancomycin  IVPB   250 mL/Hr IV Intermittent (22 @ 09:01)   250 mL/Hr IV Intermittent (22 @ 22:41)    OTHER MEDS:   MEDICATIONS  (STANDING):  acetaminophen     Tablet .. 650 every 6 hours PRN  albuterol/ipratropium for Nebulization 3 every 6 hours  dextrose 50% Injectable 25 once  dextrose 50% Injectable 12.5 once  dextrose 50% Injectable 25 once  dextrose Oral Gel 15 once PRN  famotidine Injectable 20 daily  glucagon  Injectable 1 once  heparin  Infusion.  <Continuous>  insulin glargine Injectable (LANTUS) 10 at bedtime  insulin lispro (ADMELOG) corrective regimen sliding scale  three times a day before meals  insulin lispro (ADMELOG) corrective regimen sliding scale  at bedtime  insulin lispro Injectable (ADMELOG) 4 three times a day before meals  melatonin 3 at bedtime  methylPREDNISolone sodium succinate Injectable 10 daily  oxycodone    5 mG/acetaminophen 325 mG 1 every 6 hours PRN  polyethylene glycol 3350 17 daily  QUEtiapine 25 at bedtime  senna 2 at bedtime    VITALS:  Vital Signs Last 24 Hrs  T(F): 98.1 (22 @ 12:00), Max: 100.7 (22 @ 15:00)    Vital Signs Last 24 Hrs  HR: 82 (22 @ 12:00) (72 - 97)  BP: 158/76 (22 @ 12:00) (151/67 - 178/75)  RR: 18 (22 @ 12:00)  SpO2: 96% (22 @ 12:00) (95% - 99%)  Wt(kg): --    EXAM:      Labs:                        9.6    10.87 )-----------( 327      ( 2022 22:52 )             32.1     07-06    139  |  103  |  13  ----------------------------<  306<H>  4.2   |  25  |  0.54    Ca    8.3<L>      2022 17:00  Phos  3.1       Mg     2.3         TPro  6.5  /  Alb  3.4  /  TBili  0.3  /  DBili  x   /  AST  39  /  ALT  33  /  AlkPhos  87      WBC Trend:  WBC Count: 10.87 (22 @ 22:52)  WBC Count: 14.03 (22 @ 00:18)  WBC Count: 15.41 (22 @ 14:12)  WBC Count: 16.48 (22 @ 00:32)    Auto Neutrophil #: 13.65 K/uL (22 @ 14:12)  Auto Neutrophil #: 14.16 K/uL (22 @ 00:32)  Auto Neutrophil #: 10.10 K/uL (22 @ 09:38)  Auto Neutrophil #: 19.28 K/uL (22 @ 18:53)  Auto Neutrophil #: 8.08 K/uL (22 @ 15:43)    Creatine Trend:  Creatinine, Serum: 0.54 ()  Creatinine, Serum: 0.50 ()  Creatinine, Serum: 0.50 ()  Creatinine, Serum: 0.45 ()    Liver Biochemical Testing Trend:  Alanine Aminotransferase (ALT/SGPT): 33 ()  Alanine Aminotransferase (ALT/SGPT): 29 ()  Alanine Aminotransferase (ALT/SGPT): 26 ()  Alanine Aminotransferase (ALT/SGPT): 24 ()  Alanine Aminotransferase (ALT/SGPT): 17 ()  Aspartate Aminotransferase (AST/SGOT): 39 (22 @ 00:18)  Aspartate Aminotransferase (AST/SGOT): 33 (22 @ 14:13)  Aspartate Aminotransferase (AST/SGOT): 23 (22 @ 00:32)  Aspartate Aminotransferase (AST/SGOT): 31 (22 @ 11:29)  Aspartate Aminotransferase (AST/SGOT): 20 (22 @ 01:16)  Bilirubin Total, Serum: 0.3 (-06)  Bilirubin Total, Serum: 0.2 (-05)  Bilirubin Total, Serum: 0.1 (-)  Bilirubin Total, Serum: 0.2 (-04)  Bilirubin Total, Serum: 0.2 (-04)    Auto Eosinophil %: 0.0 % (22 @ 14:12)  Auto Eosinophil %: 0.0 % (22 @ 00:32)    MICROBIOLOGY:  Vancomycin Level, Trough: 12.3 ( @ 18:44)  Vancomycin Level, Trough: 58.0 ( @ 17:00)  Vancomycin Level, Trough: 10.6 ( @ 05:14)    MRSA PCR Result.: Detected (22 @ 09:38)  MRSA PCR Result.: NotDetec (22 @ 11:01)  MRSA PCR Result.: Detected (22 @ 01:57)  MRSA PCR Result.: Detected (21 @ 18:17)    Culture - Tissue with Gram Stain (collected 2022 22:18)  Source: .Tissue Other    Culture - Sputum (collected 2022 08:49)  Source: ET Tube ET Tube  Final Report:    Moderate Proteus mirabilis ESBL    Normal Respiratory Jessica present  Organism: Proteus mirabilis ESBL  Organism: Proteus mirabilis ESBL    Sensitivities:      -  Amikacin: S <=16      -  Amoxicillin/Clavulanic Acid: S <=8/4      -  Ampicillin: R >16 These ampicillin results predict results for amoxicillin      -  Ampicillin/Sulbactam: R 8/4 Enterobacter, Klebsiella aerogenes, Citrobacter, and Serratia may develop resistance during prolonged therapy (3-4 days)      -  Aztreonam: R >16      -  Cefazolin: R >16 Enterobacter, Klebsiella aerogenes, Citrobacter, and Serratia may develop resistance during prolonged therapy (3-4 days)      -  Cefepime: R >16      -  Ceftriaxone: R >32 Enterobacter, Klebsiella aerogenes, Citrobacter, and Serratia may develop resistance during prolonged therapy      -  Ciprofloxacin: R >2      -  Ertapenem: S <=0.5      -  Gentamicin: R >8      -  Levofloxacin: R >4      -  Meropenem: S <=1      -  Piperacillin/Tazobactam: R <=8      -  Tobramycin: R >8      -  Trimethoprim/Sulfamethoxazole: R >2/38      Method Type: GARRETT    Culture - Blood (collected 2022 18:25)  Source: .Blood Blood-Peripheral  Preliminary Report:    No growth to date.    Culture - Abscess with Gram Stain (collected 2022 17:49)  Source: .Abscess R foot wound  Final Report:    Numerous Proteus mirabilis ESBL    Numerous Methicillin Resistant Staphylococcus aureus    Moderate Corynebacterium species "Susceptibilities not performed"  Organism: Proteus mirabilis ESBL  Methicillin resistant Staphylococcus aureus  Organism: Methicillin resistant Staphylococcus aureus    Sensitivities:      -  Ampicillin/Sulbactam: R <=8/4      -  Cefazolin: R 16      -  Clindamycin: S <=0.25      -  Daptomycin: S 0.5      -  Erythromycin: R >4      -  Gentamicin: S <=1 Should not be used as monotherapy      -  Linezolid: S 2      -  Oxacillin: R >2      -  Penicillin: R >8      -  Rifampin: S <=1 Should not be used as monotherapy      -  Tetra/Doxy: S <=1      -  Trimethoprim/Sulfamethoxazole: R >2/38      -  Vancomycin: S 1      Method Type: GARRETT  Organism: Proteus mirabilis ESBL    Sensitivities:      -  Amikacin: S <=16      -  Amoxicillin/Clavulanic Acid: S <=8/4      -  Ampicillin: R >16 These ampicillin results predict results for amoxicillin      -  Ampicillin/Sulbactam: R <=4/2 Enterobacter, Klebsiella aerogenes, Citrobacter, and Serratia may develop resistance during prolonged therapy (3-4 days)      -  Aztreonam: R 16      -  Cefazolin: R >16 Enterobacter, Klebsiella aerogenes, Citrobacter, and Serratia may develop resistance during prolonged therapy (3-4 days)      -  Cefepime: R 16      -  Ceftriaxone: R 32 Enterobacter, Klebsiella aerogenes, Citrobacter, and Serratia may develop resistance during prolonged therapy      -  Ciprofloxacin: R >2      -  Ertapenem: S <=0.5      -  Gentamicin: R >8      -  Levofloxacin: R >4      -  Meropenem: S <=1      -  Piperacillin/Tazobactam: R <=8      -  Tobramycin: I 8      -  Trimethoprim/Sulfamethoxazole: R >2/38      Method Type: GARRETT    Culture - Urine (collected 2022 15:44)  Source: Clean Catch Clean Catch (Midstream)  Final Report:    <10,000 CFU/mL Normal Urogenital Jessica    Culture - Blood (collected 2022 15:00)  Source: .Blood Blood-Peripheral  Preliminary Report:    No growth to date.    Culture - Blood (collected 10 May 2022 12:20)  Source: .Blood Blood-Peripheral  Final Report:    No Growth Final    Culture - Blood (collected 10 May 2022 12:15)  Source: .Blood Blood-Peripheral  Final Report:    No Growth Final    Culture - Sputum (collected 2022 22:10)  Source: .Sputum Sputum  Final Report:    Numerous Methicillin Resistant Staphylococcus aureus    Normal Respiratory Jessica present  Organism: Methicillin resistant Staphylococcus aureus  Organism: Methicillin resistant Staphylococcus aureus    Sensitivities:      -  Ampicillin/Sulbactam: R <=8/4      -  Cefazolin: R 16      -  Clindamycin: R >4      -  Erythromycin: R >4      -  Gentamicin: S <=1 Should not be used as monotherapy      -  Linezolid: S 2      -  Oxacillin: R >2      -  Penicillin: R >8      -  Rifampin: S <=1 Should not be used as monotherapy      -  Tetra/Doxy: R >8      -  Trimethoprim/Sulfamethoxazole: S <=0.5/9.5      -  Vancomycin: S 2      Method Type: GARRETT    COVID-19 PCR: NotDetec (22 @ 00:18)    C-Reactive Protein, Serum: 132 ()    Serum Pro-Brain Natriuretic Peptide: 764 ()    Troponin T, High Sensitivity Result: 29 ()  Troponin T, High Sensitivity Result: 74 ()    Blood Gas Venous - Lactate: 1.7 ( @ 00:13)    RADIOLOGY:  imaging below personally reviewed    < from: Xray Foot AP + Lateral + Oblique, Right (22 @ 15:33) >  IMPRESSION:  No tracking soft tissue gas collections, radiopaque foreign bodies, or   gross radiographic evidence for osteomyelitis.    --- End of Report ---    < end of copied text >  < from: MR Foot w/wo IV Cont, Right (22 @ 23:43) >  IMPRESSION:  1. Soft tissue wound along the medial margin of the navicular with   changes concerning for underlying osteomyelitis of the navicular.  2. Small ill-defined subcutaneous fluid collection of the plantar heel   fat-pad that may reflect an adventitial bursa versus developing abscess   measuring up to 1.0 cm in size. Correlation is suggested for signs of   infection in this location.    --- End of Report ---    < end of copied text >  < from: Xray Foot AP + Lateral, Right (22 @ 15:08) >  IMPRESSION:  Postsurgical soft tissue and focal osseous changes in medial navicular   region.    No proximally tracking gas collections or focal areas of osteomyelitis.    Remainder of foot unchanged.    Also correlate with intraoperative findings.    --- End of Report ---    < end of copied text > Patient is a 71y old  Female who presents with a chief complaint of Anaphylaxis (2022 12:38)    HPI:  70 yo F with a PMH of DM, COPD (chronic asthma), Chronic Adrenal Insufficiency on Chronic PRN, history of colorectal cancer s/p resection (colostomy bag), CAD, Chronic AF (Eliquis), tracheomalacia and multiple intubations, who initially presented with a R foot wound concerning for OM.     Pt had anaphylactic shock 2/2 Cefepime in setting of PCN allergy c/b V-tach with pulse after Epi administration requiring lidocaine for . Pt was intubated for airway protection later successfully extubated.     Pt was placed on Merrem and Vancomycin -. R foot wound culture grew MRSA and ESBL Proteus Mirabilis. MRI R foot wound performed on  showed concern for navicular bone OM as well as a plantar heel abscess. Pt underwent I&D of abscess as well navicular bone resection and biopsy with Podiatry on . There is high concern for residual bone infection. OR culture data pending    ID consulted for antibiotic recommendations     REVIEW OF SYSTEMS  Constitutional: No fevers, chills or fatigue   Skin: No rash	  Eyes: No discharge	  ENMT: No sore throat, oral thrush  Respiratory: No cough, no SOB  Cardiovascular:  No chest pain or edema   Gastrointestinal: No pain, nausea, vomiting	  Genitourinary: No dysuria or flank pain  MSK: + R foot pain  Neurological: No HA, no AMS    prior hospital charts reviewed [V]  primary team notes reviewed [V]  other consultant notes reviewed [V]    PAST MEDICAL & SURGICAL HISTORY:  Atrial fibrillation  paroxysmal, on eliquis  Diabetes  Type 2  COPD (chronic obstructive pulmonary disease)  Adrenal insufficiency  Medrol daily for over 50 years  Aortic insufficiency  moderate AR on echo 5/3/2018  Pelvic fracture  Asthma  Tracheobronchomalacia  diagnosed , s/p bronchial thermoplasty  (Dr Zapien); recent bronchoscopy 2018 revealed no evidence of tracheobronchomalacia in trachea or bronchial tubes  Colorectal cancer  2018- last treatment , chemo and radiation  Rectal bleeding  Seizure  x 1 18  DVT (deep venous thrombosis)  15-20 years ago, took coumadin  TIA (transient ischemic attack)  multiple, last 5 years ago - presents as right-sided weakness  History of partial hysterectomy  30 years ago - fibroids  H/O total knee replacement, bilateral  5 years ago  History of sinus surgery  multiple sinus surgeries  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  H/O pelvic surgery  5 years ago - s/p fracture  History of tracheomalacia   - attempted tracheal stenting ([facility])- course complicated by obstruction, respiratory failure, multiple CPR attempts -  stent discontinued; 10/20/2016 Tracheobronchoplasty (Prolene Mesh) performed at [facility] by Dr Zapien  S/P bronchoscopy  2018 - [place] (Dr Zapien) no evidence of tracheobronchomalacia in trachea or bronchial tubes  Rectal bleeding  exam under anesthesia ([facility]) 2018    SOCIAL HISTORY:  - Denied smoking/vaping/alcohol/recreational drug use    FAMILY HISTORY:  Family history of asthma  Family history of breast cancer (Sibling)  Family history of diabetes mellitus type II    Allergies  aspirin (Short breath)  Avelox (Short breath; Pruritus)  cefepime (Anaphylaxis)  codeine (Short breath)  Dilaudid (Short breath)  iodine (Short breath; Swelling)  penicillin (Unknown)  shellfish (Anaphylaxis)  tetanus toxoid (Short breath)  Valium (Short breath)    ANTIMICROBIALS:  meropenem  IVPB 1000 every 8 hours  vancomycin  IVPB 1000 every 12 hours    ANTIMICROBIALS (past 90 days):  MEDICATIONS  (STANDING):  cefepime   IVPB   100 mL/Hr IV Intermittent (22 @ 15:54)    meropenem  IVPB   100 mL/Hr IV Intermittent (22 @ 14:07)   100 mL/Hr IV Intermittent (22 @ 05:28)   100 mL/Hr IV Intermittent (22 @ 22:41)   100 mL/Hr IV Intermittent (22 @ 14:57)   100 mL/Hr IV Intermittent (22 @ 06:08)   100 mL/Hr IV Intermittent (22 @ 22:03)   100 mL/Hr IV Intermittent (22 @ 14:10)   100 mL/Hr IV Intermittent (22 @ 06:03)   100 mL/Hr IV Intermittent (22 @ 23:12)   100 mL/Hr IV Intermittent (22 @ 14:29)   100 mL/Hr IV Intermittent (22 @ 06:06)   100 mL/Hr IV Intermittent (22 @ 00:13)   100 mL/Hr IV Intermittent (22 @ 13:25)   100 mL/Hr IV Intermittent (22 @ 05:07)   100 mL/Hr IV Intermittent (22 @ 22:32)    vancomycin  IVPB   250 mL/Hr IV Intermittent (22 @ 05:13)   250 mL/Hr IV Intermittent (22 @ 18:23)   250 mL/Hr IV Intermittent (22 @ 07:15)   250 mL/Hr IV Intermittent (22 @ 17:49)   250 mL/Hr IV Intermittent (22 @ 05:11)   250 mL/Hr IV Intermittent (22 @ 19:08)    vancomycin  IVPB   250 mL/Hr IV Intermittent (22 @ 09:01)   250 mL/Hr IV Intermittent (22 @ 22:41)    OTHER MEDS:   MEDICATIONS  (STANDING):  acetaminophen     Tablet .. 650 every 6 hours PRN  albuterol/ipratropium for Nebulization 3 every 6 hours  dextrose 50% Injectable 25 once  dextrose 50% Injectable 12.5 once  dextrose 50% Injectable 25 once  dextrose Oral Gel 15 once PRN  famotidine Injectable 20 daily  glucagon  Injectable 1 once  heparin  Infusion.  <Continuous>  insulin glargine Injectable (LANTUS) 10 at bedtime  insulin lispro (ADMELOG) corrective regimen sliding scale  three times a day before meals  insulin lispro (ADMELOG) corrective regimen sliding scale  at bedtime  insulin lispro Injectable (ADMELOG) 4 three times a day before meals  melatonin 3 at bedtime  methylPREDNISolone sodium succinate Injectable 10 daily  oxycodone    5 mG/acetaminophen 325 mG 1 every 6 hours PRN  polyethylene glycol 3350 17 daily  QUEtiapine 25 at bedtime  senna 2 at bedtime    VITALS:  Vital Signs Last 24 Hrs  T(F): 98.1 (22 @ 12:00), Max: 100.7 (22 @ 15:00)    Vital Signs Last 24 Hrs  HR: 82 (22 @ 12:00) (72 - 97)  BP: 158/76 (22 @ 12:00) (151/67 - 178/75)  RR: 18 (22 @ 12:00)  SpO2: 96% (22 @ 12:00) (95% - 99%)  Wt(kg): --    EXAM:  General: Patient in NAD  HEENT: NCAT, EOMI, no oral lesions  CV: S1+S2, no m/r/g appreciated   Lungs: No respiratory distress, CTAB  Abd: Soft, nontender, colostomy in place  : No suprapubic tenderness  Neuro: Alert and oriented to time, place and person. No focal deficits noted.   Ext: No cyanosis, no edema. R foot bandaged.   Skin: No rash, no phlebitis    Labs:               9.6    10.87 )-----------( 327      ( 2022 22:52 )             32.1         139  |  103  |  13  ----------------------------<  306<H>  4.2   |  25  |  0.54    Ca    8.3<L>      2022 17:00  Phos  3.1       Mg     2.3         TPro  6.5  /  Alb  3.4  /  TBili  0.3  /  DBili  x   /  AST  39  /  ALT  33  /  AlkPhos  87      WBC Trend:  WBC Count: 10.87 (22 @ 22:52)  WBC Count: 14.03 (22 @ 00:18)  WBC Count: 15.41 (22 @ 14:12)  WBC Count: 16.48 (22 @ 00:32)    Auto Neutrophil #: 13.65 K/uL (22 @ 14:12)  Auto Neutrophil #: 14.16 K/uL (22 @ 00:32)  Auto Neutrophil #: 10.10 K/uL (22 @ 09:38)  Auto Neutrophil #: 19.28 K/uL (22 @ 18:53)  Auto Neutrophil #: 8.08 K/uL (22 @ 15:43)    Creatine Trend:  Creatinine, Serum: 0.54 ()  Creatinine, Serum: 0.50 ()  Creatinine, Serum: 0.50 ()  Creatinine, Serum: 0.45 ()    Liver Biochemical Testing Trend:  Alanine Aminotransferase (ALT/SGPT): 33 ()  Alanine Aminotransferase (ALT/SGPT): 29 (-)  Alanine Aminotransferase (ALT/SGPT): 26 ()  Alanine Aminotransferase (ALT/SGPT): 24 ()  Alanine Aminotransferase (ALT/SGPT): 17 (-04)  Aspartate Aminotransferase (AST/SGOT): 39 (22 @ 00:18)  Aspartate Aminotransferase (AST/SGOT): 33 (22 @ 14:13)  Aspartate Aminotransferase (AST/SGOT): 23 (22 @ 00:32)  Aspartate Aminotransferase (AST/SGOT): 31 (22 @ 11:29)  Aspartate Aminotransferase (AST/SGOT): 20 (22 @ 01:16)  Bilirubin Total, Serum: 0.3 ()  Bilirubin Total, Serum: 0.2 ()  Bilirubin Total, Serum: 0.1 ()  Bilirubin Total, Serum: 0.2 ()  Bilirubin Total, Serum: 0.2 ()    Auto Eosinophil %: 0.0 % (22 @ 14:12)  Auto Eosinophil %: 0.0 % (22 @ 00:32)    MICROBIOLOGY:  Vancomycin Level, Trough: 12.3 ( @ 18:44)  Vancomycin Level, Trough: 58.0 ( @ 17:00)  Vancomycin Level, Trough: 10.6 ( @ 05:14)    MRSA PCR Result.: Detected (22 @ 09:38)  MRSA PCR Result.: NotDetec (22 @ 11:01)  MRSA PCR Result.: Detected (22 @ 01:57)  MRSA PCR Result.: Detected (21 @ 18:17)    Culture - Tissue with Gram Stain (collected 2022 22:18)  Source: .Tissue Other    Culture - Sputum (collected 2022 08:49)  Source: ET Tube ET Tube  Final Report:    Moderate Proteus mirabilis ESBL    Normal Respiratory Jessica present  Organism: Proteus mirabilis ESBL  Organism: Proteus mirabilis ESBL    Sensitivities:      -  Amikacin: S <=16      -  Amoxicillin/Clavulanic Acid: S <=8/4      -  Ampicillin: R >16 These ampicillin results predict results for amoxicillin      -  Ampicillin/Sulbactam: R 8/4 Enterobacter, Klebsiella aerogenes, Citrobacter, and Serratia may develop resistance during prolonged therapy (3-4 days)      -  Aztreonam: R >16      -  Cefazolin: R >16 Enterobacter, Klebsiella aerogenes, Citrobacter, and Serratia may develop resistance during prolonged therapy (3-4 days)      -  Cefepime: R >16      -  Ceftriaxone: R >32 Enterobacter, Klebsiella aerogenes, Citrobacter, and Serratia may develop resistance during prolonged therapy      -  Ciprofloxacin: R >2      -  Ertapenem: S <=0.5      -  Gentamicin: R >8      -  Levofloxacin: R >4      -  Meropenem: S <=1      -  Piperacillin/Tazobactam: R <=8      -  Tobramycin: R >8      -  Trimethoprim/Sulfamethoxazole: R >      Method Type: GARRETT    Culture - Blood (collected 2022 18:25)  Source: .Blood Blood-Peripheral  Preliminary Report:    No growth to date.    Culture - Abscess with Gram Stain (collected 2022 17:49)  Source: .Abscess R foot wound  Final Report:    Numerous Proteus mirabilis ESBL    Numerous Methicillin Resistant Staphylococcus aureus    Moderate Corynebacterium species "Susceptibilities not performed"  Organism: Proteus mirabilis ESBL  Methicillin resistant Staphylococcus aureus  Organism: Methicillin resistant Staphylococcus aureus    Sensitivities:      -  Ampicillin/Sulbactam: R <=8/4      -  Cefazolin: R 16      -  Clindamycin: S <=0.25      -  Daptomycin: S 0.5      -  Erythromycin: R >4      -  Gentamicin: S <=1 Should not be used as monotherapy      -  Linezolid: S 2      -  Oxacillin: R >2      -  Penicillin: R >8      -  Rifampin: S <=1 Should not be used as monotherapy      -  Tetra/Doxy: S <=1      -  Trimethoprim/Sulfamethoxazole: R >      -  Vancomycin: S 1      Method Type: GARRETT  Organism: Proteus mirabilis ESBL    Sensitivities:      -  Amikacin: S <=16      -  Amoxicillin/Clavulanic Acid: S <=8/4      -  Ampicillin: R >16 These ampicillin results predict results for amoxicillin      -  Ampicillin/Sulbactam: R <=4/2 Enterobacter, Klebsiella aerogenes, Citrobacter, and Serratia may develop resistance during prolonged therapy (3-4 days)      -  Aztreonam: R 16      -  Cefazolin: R >16 Enterobacter, Klebsiella aerogenes, Citrobacter, and Serratia may develop resistance during prolonged therapy (3-4 days)      -  Cefepime: R 16      -  Ceftriaxone: R 32 Enterobacter, Klebsiella aerogenes, Citrobacter, and Serratia may develop resistance during prolonged therapy      -  Ciprofloxacin: R >2      -  Ertapenem: S <=0.5      -  Gentamicin: R >8      -  Levofloxacin: R >4      -  Meropenem: S <=1      -  Piperacillin/Tazobactam: R <=8      -  Tobramycin: I 8      -  Trimethoprim/Sulfamethoxazole: R >2/38      Method Type: GARRETT    Culture - Urine (collected 2022 15:44)  Source: Clean Catch Clean Catch (Midstream)  Final Report:    <10,000 CFU/mL Normal Urogenital Jessica    Culture - Blood (collected 2022 15:00)  Source: .Blood Blood-Peripheral  Preliminary Report:    No growth to date.    Culture - Blood (collected 10 May 2022 12:20)  Source: .Blood Blood-Peripheral  Final Report:    No Growth Final    Culture - Blood (collected 10 May 2022 12:15)  Source: .Blood Blood-Peripheral  Final Report:    No Growth Final    Culture - Sputum (collected 2022 22:10)  Source: .Sputum Sputum  Final Report:    Numerous Methicillin Resistant Staphylococcus aureus    Normal Respiratory Jessica present  Organism: Methicillin resistant Staphylococcus aureus  Organism: Methicillin resistant Staphylococcus aureus    Sensitivities:      -  Ampicillin/Sulbactam: R <=8/4      -  Cefazolin: R 16      -  Clindamycin: R >4      -  Erythromycin: R >4      -  Gentamicin: S <=1 Should not be used as monotherapy      -  Linezolid: S 2      -  Oxacillin: R >2      -  Penicillin: R >8      -  Rifampin: S <=1 Should not be used as monotherapy      -  Tetra/Doxy: R >8      -  Trimethoprim/Sulfamethoxazole: S <=0.5/9.5      -  Vancomycin: S 2      Method Type: GARRETT    COVID-19 PCR: NotDetec (22 @ 00:18)    C-Reactive Protein, Serum: 132 ()    Serum Pro-Brain Natriuretic Peptide: 764 ()    Troponin T, High Sensitivity Result: 29 ()  Troponin T, High Sensitivity Result: 74 ()    Blood Gas Venous - Lactate: 1.7 ( @ 00:13)    RADIOLOGY:  imaging below personally reviewed    < from: Xray Foot AP + Lateral + Oblique, Right (22 @ 15:33) >  IMPRESSION:  No tracking soft tissue gas collections, radiopaque foreign bodies, or   gross radiographic evidence for osteomyelitis.    --- End of Report ---    < end of copied text >  < from: MR Foot w/wo IV Cont, Right (22 @ 23:43) >  IMPRESSION:  1. Soft tissue wound along the medial margin of the navicular with   changes concerning for underlying osteomyelitis of the navicular.  2. Small ill-defined subcutaneous fluid collection of the plantar heel   fat-pad that may reflect an adventitial bursa versus developing abscess   measuring up to 1.0 cm in size. Correlation is suggested for signs of   infection in this location.    --- End of Report ---    < end of copied text >  < from: Xray Foot AP + Lateral, Right (22 @ 15:08) >  IMPRESSION:  Postsurgical soft tissue and focal osseous changes in medial navicular   region.    No proximally tracking gas collections or focal areas of osteomyelitis.    Remainder of foot unchanged.    Also correlate with intraoperative findings.    --- End of Report ---    < end of copied text > Patient is a 71y old  Female who presents with a chief complaint of Anaphylaxis (2022 12:38)    HPI:  72 yo F with a PMH of DM, COPD (chronic asthma), Chronic Adrenal Insufficiency on Chronic PRN, history of colorectal cancer s/p resection (colostomy bag), CAD, Chronic AF (Eliquis), tracheomalacia and multiple intubations, who initially presented with a R foot wound concerning for OM.     Pt had anaphylactic shock 2/2 Cefepime in setting of PCN allergy c/b V-tach with pulse after Epi administration requiring lidocaine for . Pt was intubated for airway protection later successfully extubated.     Pt was placed on Merrem and Vancomycin -. R foot wound culture grew MRSA and ESBL Proteus Mirabilis. MRI R foot wound performed on  showed concern for navicular bone OM as well as a plantar heel abscess. Pt underwent I&D of abscess as well navicular bone resection and biopsy with Podiatry on . There is high concern for residual bone infection. OR culture data pending    ID consulted for antibiotic recommendations     REVIEW OF SYSTEMS  Constitutional: No fevers, chills or fatigue   Skin: No rash	  Eyes: No discharge	  ENMT: No sore throat, oral thrush  Respiratory: No cough, no SOB  Cardiovascular:  No chest pain or edema   Gastrointestinal: No pain, nausea, vomiting	  Genitourinary: No dysuria or flank pain  MSK: + R foot pain  Neurological: No HA, no AMS    prior hospital charts reviewed [V]  primary team notes reviewed [V]  other consultant notes reviewed [V]    PAST MEDICAL & SURGICAL HISTORY:  Atrial fibrillation  paroxysmal, on eliquis  Diabetes  Type 2  COPD (chronic obstructive pulmonary disease)  Adrenal insufficiency  Medrol daily for over 50 years  Aortic insufficiency  moderate AR on echo 5/3/2018  Pelvic fracture  Asthma  Tracheobronchomalacia  diagnosed , s/p bronchial thermoplasty  (Dr Zapien); recent bronchoscopy 2018 revealed no evidence of tracheobronchomalacia in trachea or bronchial tubes  Colorectal cancer  2018- last treatment , chemo and radiation  Rectal bleeding  Seizure  x 1 18  DVT (deep venous thrombosis)  15-20 years ago, took coumadin  TIA (transient ischemic attack)  multiple, last 5 years ago - presents as right-sided weakness  History of partial hysterectomy  30 years ago - fibroids  H/O total knee replacement, bilateral  5 years ago  History of sinus surgery  multiple sinus surgeries  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  H/O pelvic surgery  5 years ago - s/p fracture  History of tracheomalacia   - attempted tracheal stenting ([facility])- course complicated by obstruction, respiratory failure, multiple CPR attempts -  stent discontinued; 10/20/2016 Tracheobronchoplasty (Prolene Mesh) performed at [facility] by Dr Zapien  S/P bronchoscopy  2018 - [place] (Dr Zapien) no evidence of tracheobronchomalacia in trachea or bronchial tubes  Rectal bleeding  exam under anesthesia ([facility]) 2018    SOCIAL HISTORY:  lives with her sister but she also has medical conditions  - Denied smoking/vaping/alcohol/recreational drug use    FAMILY HISTORY:  Family history of asthma  Family history of breast cancer (Sibling)  Family history of diabetes mellitus type II    Allergies  aspirin (Short breath)  Avelox (Short breath; Pruritus)  cefepime (Anaphylaxis)  codeine (Short breath)  Dilaudid (Short breath)  iodine (Short breath; Swelling)  penicillin (Unknown)  shellfish (Anaphylaxis)  tetanus toxoid (Short breath)  Valium (Short breath)    ANTIMICROBIALS:  meropenem  IVPB 1000 every 8 hours  vancomycin  IVPB 1000 every 12 hours    ANTIMICROBIALS (past 90 days):  MEDICATIONS  (STANDING):  cefepime   IVPB   100 mL/Hr IV Intermittent (22 @ 15:54)    meropenem  IVPB   100 mL/Hr IV Intermittent (22 @ 14:07)   100 mL/Hr IV Intermittent (22 @ 05:28)   100 mL/Hr IV Intermittent (22 @ 22:41)   100 mL/Hr IV Intermittent (22 @ 14:57)   100 mL/Hr IV Intermittent (22 @ 06:08)   100 mL/Hr IV Intermittent (22 @ 22:03)   100 mL/Hr IV Intermittent (22 @ 14:10)   100 mL/Hr IV Intermittent (22 @ 06:03)   100 mL/Hr IV Intermittent (22 @ 23:12)   100 mL/Hr IV Intermittent (22 @ 14:29)   100 mL/Hr IV Intermittent (22 @ 06:06)   100 mL/Hr IV Intermittent (22 @ 00:13)   100 mL/Hr IV Intermittent (22 @ 13:25)   100 mL/Hr IV Intermittent (22 @ 05:07)   100 mL/Hr IV Intermittent (22 @ 22:32)    vancomycin  IVPB   250 mL/Hr IV Intermittent (22 @ 05:13)   250 mL/Hr IV Intermittent (22 @ 18:23)   250 mL/Hr IV Intermittent (22 @ 07:15)   250 mL/Hr IV Intermittent (22 @ 17:49)   250 mL/Hr IV Intermittent (22 @ 05:11)   250 mL/Hr IV Intermittent (22 @ 19:08)    vancomycin  IVPB   250 mL/Hr IV Intermittent (22 @ 09:01)   250 mL/Hr IV Intermittent (22 @ 22:41)    OTHER MEDS:   MEDICATIONS  (STANDING):  acetaminophen     Tablet .. 650 every 6 hours PRN  albuterol/ipratropium for Nebulization 3 every 6 hours  dextrose 50% Injectable 25 once  dextrose 50% Injectable 12.5 once  dextrose 50% Injectable 25 once  dextrose Oral Gel 15 once PRN  famotidine Injectable 20 daily  glucagon  Injectable 1 once  heparin  Infusion.  <Continuous>  insulin glargine Injectable (LANTUS) 10 at bedtime  insulin lispro (ADMELOG) corrective regimen sliding scale  three times a day before meals  insulin lispro (ADMELOG) corrective regimen sliding scale  at bedtime  insulin lispro Injectable (ADMELOG) 4 three times a day before meals  melatonin 3 at bedtime  methylPREDNISolone sodium succinate Injectable 10 daily  oxycodone    5 mG/acetaminophen 325 mG 1 every 6 hours PRN  polyethylene glycol 3350 17 daily  QUEtiapine 25 at bedtime  senna 2 at bedtime    VITALS:  Vital Signs Last 24 Hrs  T(F): 98.1 (22 @ 12:00), Max: 100.7 (22 @ 15:00)    Vital Signs Last 24 Hrs  HR: 82 (22 @ 12:00) (72 - 97)  BP: 158/76 (22 @ 12:00) (151/67 - 178/75)  RR: 18 (22 @ 12:00)  SpO2: 96% (22 @ 12:00) (95% - 99%)  Wt(kg): --    EXAM:  General: Patient in NAD  Head: atraumatic, normocephalic  Eyes: slight proptosis  ENT: NCAT, EOMI, no oral lesions  CV: S1+S2, no m/r/g appreciated   Lungs: No respiratory distress, CTAB  Abd: Soft, nontender, colostomy in place  : No suprapubic tenderness  Neuro: Alert and oriented to time, place and person. No focal deficits noted.   Ext: No cyanosis, no edema.   MSK: R foot round medial ulcer, some tenderness   Skin: No rash, no phlebitis  vascular: no phlebitis  psych: normal affect    Labs:               9.6    10.87 )-----------( 327      ( 2022 22:52 )             32.1         139  |  103  |  13  ----------------------------<  306<H>  4.2   |  25  |  0.54    Ca    8.3<L>      2022 17:00  Phos  3.1       Mg     2.3         TPro  6.5  /  Alb  3.4  /  TBili  0.3  /  DBili  x   /  AST  39  /  ALT  33  /  AlkPhos  87      WBC Trend:  WBC Count: 10.87 (22 @ 22:52)  WBC Count: 14.03 (22 @ 00:18)  WBC Count: 15.41 (22 @ 14:12)  WBC Count: 16.48 (22 @ 00:32)    Auto Neutrophil #: 13.65 K/uL (22 @ 14:12)  Auto Neutrophil #: 14.16 K/uL (22 @ 00:32)  Auto Neutrophil #: 10.10 K/uL (22 @ 09:38)  Auto Neutrophil #: 19.28 K/uL (22 @ 18:53)  Auto Neutrophil #: 8.08 K/uL (22 @ 15:43)    Creatine Trend:  Creatinine, Serum: 0.54 ()  Creatinine, Serum: 0.50 (07-06)  Creatinine, Serum: 0.50 (-05)  Creatinine, Serum: 0.45 (07-05)    Liver Biochemical Testing Trend:  Alanine Aminotransferase (ALT/SGPT): 33 (-06)  Alanine Aminotransferase (ALT/SGPT): 29 (07-05)  Alanine Aminotransferase (ALT/SGPT): 26 (-05)  Alanine Aminotransferase (ALT/SGPT): 24 (-04)  Alanine Aminotransferase (ALT/SGPT): 17 (-04)  Aspartate Aminotransferase (AST/SGOT): 39 (22 @ 00:18)  Aspartate Aminotransferase (AST/SGOT): 33 (22 @ 14:13)  Aspartate Aminotransferase (AST/SGOT): 23 (22 @ 00:32)  Aspartate Aminotransferase (AST/SGOT): 31 (22 @ 11:29)  Aspartate Aminotransferase (AST/SGOT): 20 (22 @ 01:16)  Bilirubin Total, Serum: 0.3 (-06)  Bilirubin Total, Serum: 0.2 (-05)  Bilirubin Total, Serum: 0.1 (-05)  Bilirubin Total, Serum: 0.2 (-04)  Bilirubin Total, Serum: 0.2 (-04)    Auto Eosinophil %: 0.0 % (22 @ 14:12)  Auto Eosinophil %: 0.0 % (22 @ 00:32)    MICROBIOLOGY:  Vancomycin Level, Trough: 12.3 ( @ 18:44)  Vancomycin Level, Trough: 58.0 ( @ 17:00)  Vancomycin Level, Trough: 10.6 ( @ 05:14)    MRSA PCR Result.: Detected (22 @ 09:38)  MRSA PCR Result.: NotDetec (22 @ 11:01)  MRSA PCR Result.: Detected (22 @ 01:57)  MRSA PCR Result.: Detected (21 @ 18:17)    Culture - Tissue with Gram Stain (collected 2022 22:18)  Source: .Tissue Other    Culture - Sputum (collected 2022 08:49)  Source: ET Tube ET Tube  Final Report:    Moderate Proteus mirabilis ESBL    Normal Respiratory Jessica present  Organism: Proteus mirabilis ESBL  Organism: Proteus mirabilis ESBL    Sensitivities:      -  Amikacin: S <=16      -  Amoxicillin/Clavulanic Acid: S <=8/4      -  Ampicillin: R >16 These ampicillin results predict results for amoxicillin      -  Ampicillin/Sulbactam: R 8/4 Enterobacter, Klebsiella aerogenes, Citrobacter, and Serratia may develop resistance during prolonged therapy (3-4 days)      -  Aztreonam: R >16      -  Cefazolin: R >16 Enterobacter, Klebsiella aerogenes, Citrobacter, and Serratia may develop resistance during prolonged therapy (3-4 days)      -  Cefepime: R >16      -  Ceftriaxone: R >32 Enterobacter, Klebsiella aerogenes, Citrobacter, and Serratia may develop resistance during prolonged therapy      -  Ciprofloxacin: R >2      -  Ertapenem: S <=0.5      -  Gentamicin: R >8      -  Levofloxacin: R >4      -  Meropenem: S <=1      -  Piperacillin/Tazobactam: R <=8      -  Tobramycin: R >8      -  Trimethoprim/Sulfamethoxazole: R >      Method Type: GARRETT    Culture - Blood (collected 2022 18:25)  Source: .Blood Blood-Peripheral  Preliminary Report:    No growth to date.    Culture - Abscess with Gram Stain (collected 2022 17:49)  Source: .Abscess R foot wound  Final Report:    Numerous Proteus mirabilis ESBL    Numerous Methicillin Resistant Staphylococcus aureus    Moderate Corynebacterium species "Susceptibilities not performed"  Organism: Proteus mirabilis ESBL  Methicillin resistant Staphylococcus aureus  Organism: Methicillin resistant Staphylococcus aureus    Sensitivities:      -  Ampicillin/Sulbactam: R <=8/4      -  Cefazolin: R 16      -  Clindamycin: S <=0.25      -  Daptomycin: S 0.5      -  Erythromycin: R >4      -  Gentamicin: S <=1 Should not be used as monotherapy      -  Linezolid: S 2      -  Oxacillin: R >2      -  Penicillin: R >8      -  Rifampin: S <=1 Should not be used as monotherapy      -  Tetra/Doxy: S <=1      -  Trimethoprim/Sulfamethoxazole: R >38      -  Vancomycin: S 1      Method Type: GARRETT  Organism: Proteus mirabilis ESBL    Sensitivities:      -  Amikacin: S <=16      -  Amoxicillin/Clavulanic Acid: S <=8/4      -  Ampicillin: R >16 These ampicillin results predict results for amoxicillin      -  Ampicillin/Sulbactam: R <=4/2 Enterobacter, Klebsiella aerogenes, Citrobacter, and Serratia may develop resistance during prolonged therapy (3-4 days)      -  Aztreonam: R 16      -  Cefazolin: R >16 Enterobacter, Klebsiella aerogenes, Citrobacter, and Serratia may develop resistance during prolonged therapy (3-4 days)      -  Cefepime: R 16      -  Ceftriaxone: R 32 Enterobacter, Klebsiella aerogenes, Citrobacter, and Serratia may develop resistance during prolonged therapy      -  Ciprofloxacin: R >2      -  Ertapenem: S <=0.5      -  Gentamicin: R >8      -  Levofloxacin: R >4      -  Meropenem: S <=1      -  Piperacillin/Tazobactam: R <=8      -  Tobramycin: I 8      -  Trimethoprim/Sulfamethoxazole: R >2/38      Method Type: GARRETT    Culture - Urine (collected 2022 15:44)  Source: Clean Catch Clean Catch (Midstream)  Final Report:    <10,000 CFU/mL Normal Urogenital Jessica    Culture - Blood (collected 2022 15:00)  Source: .Blood Blood-Peripheral  Preliminary Report:    No growth to date.    Culture - Blood (collected 10 May 2022 12:20)  Source: .Blood Blood-Peripheral  Final Report:    No Growth Final    Culture - Blood (collected 10 May 2022 12:15)  Source: .Blood Blood-Peripheral  Final Report:    No Growth Final    Culture - Sputum (collected 2022 22:10)  Source: .Sputum Sputum  Final Report:    Numerous Methicillin Resistant Staphylococcus aureus    Normal Respiratory Jessica present  Organism: Methicillin resistant Staphylococcus aureus  Organism: Methicillin resistant Staphylococcus aureus    Sensitivities:      -  Ampicillin/Sulbactam: R <=8/4      -  Cefazolin: R 16      -  Clindamycin: R >4      -  Erythromycin: R >4      -  Gentamicin: S <=1 Should not be used as monotherapy      -  Linezolid: S 2      -  Oxacillin: R >2      -  Penicillin: R >8      -  Rifampin: S <=1 Should not be used as monotherapy      -  Tetra/Doxy: R >8      -  Trimethoprim/Sulfamethoxazole: S <=0.5/9.5      -  Vancomycin: S 2      Method Type: GARRETT    COVID-19 PCR: NotDetec (22 @ 00:18)    C-Reactive Protein, Serum: 132 ()    Serum Pro-Brain Natriuretic Peptide: 764 ()    Troponin T, High Sensitivity Result: 29 ()  Troponin T, High Sensitivity Result: 74 ()    Blood Gas Venous - Lactate: 1.7 ( @ 00:13)    RADIOLOGY:  imaging below personally reviewed    < from: Xray Foot AP + Lateral + Oblique, Right (22 @ 15:33) >  IMPRESSION:  No tracking soft tissue gas collections, radiopaque foreign bodies, or   gross radiographic evidence for osteomyelitis.    --- End of Report ---    < end of copied text >  < from: MR Foot w/wo IV Cont, Right (22 @ 23:43) >  IMPRESSION:  1. Soft tissue wound along the medial margin of the navicular with   changes concerning for underlying osteomyelitis of the navicular.  2. Small ill-defined subcutaneous fluid collection of the plantar heel   fat-pad that may reflect an adventitial bursa versus developing abscess   measuring up to 1.0 cm in size. Correlation is suggested for signs of   infection in this location.    --- End of Report ---    < end of copied text >  < from: Xray Foot AP + Lateral, Right (22 @ 15:08) >  IMPRESSION:  Postsurgical soft tissue and focal osseous changes in medial navicular   region.    No proximally tracking gas collections or focal areas of osteomyelitis.    Remainder of foot unchanged.    Also correlate with intraoperative findings.    --- End of Report ---    < end of copied text >

## 2022-07-07 NOTE — PHYSICAL THERAPY INITIAL EVALUATION ADULT - PRECAUTIONS/LIMITATIONS, REHAB EVAL
Course complicated by medication induced anaphylaxis 2/2 cefepime in setting of penicillin allergy. Further complicated by V-tach with pulse after epi administration, requiring lidocaine for . Patient was intubated and sedated for hypoxia and airway protection. Pt was successfully extubated, with improving mental status on nasal cannula. MRI showed osteomyelitis and developing abscess in R foot, with wound cultures showing MRSA and proteus mirabilis. Patient was continued on meropenem and vancomycin. Podiatry is following this patient, she is s/p right foot incision and drainage and wound debridement  with Dr. Valenzuela. Patient is now hemodynamically stable and has been transferred to medicine floor. She was examined at bedside. Pt subjectively reports that her right foot is in pain, and does not feel her current pain regimen is effectively addressing her pain. Pt is breathing comfortably on 3L NC and is amenable to down titrating O2. Right foot dressing is clean and dry with no drainage./fall precautions

## 2022-07-07 NOTE — PROGRESS NOTE ADULT - PROBLEM SELECTOR PLAN 4
- Hx of Afib on Eliquis at home.   - Most recent Echo with EF 67% Moderate-severe aortic regurgitation and mild tricuspid regurgitation. No visible evidence of LV dysfunction  - On telemetry  - aPTT >200 at 11PM 7/6, repeat aPTT this AM - Sliding scale insulin   - Diabetes education - Insulin dosing modified due to elevated POCT glucose; plan for 10 U Lantus for basal insulin and 4 U Admelog pre-meal  - Consistent carbohydrate diet   - Sliding scale insulin as needed  - Diabetes education  - A1c 8.0 in May 2022

## 2022-07-07 NOTE — PROGRESS NOTE ADULT - PROBLEM SELECTOR PLAN 3
- Hx of chronic atrial fibrillation on Eliquis   - Episode of Vtach w/pulse following epi treatment for medication-induced anaphylaxis, requiring lidocaine for    - ON aPTT >200, heparin held; aPTT this AM 26, heparin drip adjusted from 9-12 ml/hr per nomogram

## 2022-07-07 NOTE — PROGRESS NOTE ADULT - PROBLEM SELECTOR PLAN 9
- Bowel regimen: Continue home senna, miralax, and lactulose  - Home eliquis held due to podiatry procedure 7/6 and potential upcoming PICC placement  - Diet: Carb consistent  - PT/OT  - Pain management: Percocet PRN for severe pain, Tylenol PRN for moderate pain   - Blood draws through mediport

## 2022-07-08 DIAGNOSIS — E78.5 HYPERLIPIDEMIA, UNSPECIFIED: ICD-10-CM

## 2022-07-08 DIAGNOSIS — E83.39 OTHER DISORDERS OF PHOSPHORUS METABOLISM: ICD-10-CM

## 2022-07-08 DIAGNOSIS — Z86.79 PERSONAL HISTORY OF OTHER DISEASES OF THE CIRCULATORY SYSTEM: ICD-10-CM

## 2022-07-08 LAB
-  AMIKACIN: SIGNIFICANT CHANGE UP
-  AMIKACIN: SIGNIFICANT CHANGE UP
-  AMOXICILLIN/CLAVULANIC ACID: SIGNIFICANT CHANGE UP
-  AMOXICILLIN/CLAVULANIC ACID: SIGNIFICANT CHANGE UP
-  AMPICILLIN/SULBACTAM: SIGNIFICANT CHANGE UP
-  AMPICILLIN: SIGNIFICANT CHANGE UP
-  AMPICILLIN: SIGNIFICANT CHANGE UP
-  AZTREONAM: SIGNIFICANT CHANGE UP
-  AZTREONAM: SIGNIFICANT CHANGE UP
-  CEFAZOLIN: SIGNIFICANT CHANGE UP
-  CEFEPIME: SIGNIFICANT CHANGE UP
-  CEFEPIME: SIGNIFICANT CHANGE UP
-  CEFTRIAXONE: SIGNIFICANT CHANGE UP
-  CEFTRIAXONE: SIGNIFICANT CHANGE UP
-  CIPROFLOXACIN: SIGNIFICANT CHANGE UP
-  CIPROFLOXACIN: SIGNIFICANT CHANGE UP
-  CLINDAMYCIN: SIGNIFICANT CHANGE UP
-  DAPTOMYCIN: SIGNIFICANT CHANGE UP
-  ERTAPENEM: SIGNIFICANT CHANGE UP
-  ERTAPENEM: SIGNIFICANT CHANGE UP
-  ERYTHROMYCIN: SIGNIFICANT CHANGE UP
-  GENTAMICIN: SIGNIFICANT CHANGE UP
-  LEVOFLOXACIN: SIGNIFICANT CHANGE UP
-  LEVOFLOXACIN: SIGNIFICANT CHANGE UP
-  LINEZOLID: SIGNIFICANT CHANGE UP
-  MEROPENEM: SIGNIFICANT CHANGE UP
-  MEROPENEM: SIGNIFICANT CHANGE UP
-  OXACILLIN: SIGNIFICANT CHANGE UP
-  PENICILLIN: SIGNIFICANT CHANGE UP
-  PIPERACILLIN/TAZOBACTAM: SIGNIFICANT CHANGE UP
-  PIPERACILLIN/TAZOBACTAM: SIGNIFICANT CHANGE UP
-  RIFAMPIN: SIGNIFICANT CHANGE UP
-  TETRACYCLINE: SIGNIFICANT CHANGE UP
-  TOBRAMYCIN: SIGNIFICANT CHANGE UP
-  TOBRAMYCIN: SIGNIFICANT CHANGE UP
-  TRIMETHOPRIM/SULFAMETHOXAZOLE: SIGNIFICANT CHANGE UP
-  VANCOMYCIN: SIGNIFICANT CHANGE UP
ALBUMIN SERPL ELPH-MCNC: 3.1 G/DL — LOW (ref 3.3–5)
ALP SERPL-CCNC: 78 U/L — SIGNIFICANT CHANGE UP (ref 40–120)
ALT FLD-CCNC: 22 U/L — SIGNIFICANT CHANGE UP (ref 10–45)
ANION GAP SERPL CALC-SCNC: 10 MMOL/L — SIGNIFICANT CHANGE UP (ref 5–17)
APTT BLD: 54.5 SEC — HIGH (ref 27.5–35.5)
APTT BLD: 62.3 SEC — HIGH (ref 27.5–35.5)
APTT BLD: 80.1 SEC — HIGH (ref 27.5–35.5)
APTT BLD: >200 SEC — CRITICAL HIGH (ref 27.5–35.5)
AST SERPL-CCNC: 13 U/L — SIGNIFICANT CHANGE UP (ref 10–40)
BILIRUB SERPL-MCNC: 0.2 MG/DL — SIGNIFICANT CHANGE UP (ref 0.2–1.2)
BUN SERPL-MCNC: 10 MG/DL — SIGNIFICANT CHANGE UP (ref 7–23)
CALCIUM SERPL-MCNC: 9 MG/DL — SIGNIFICANT CHANGE UP (ref 8.4–10.5)
CHLORIDE SERPL-SCNC: 101 MMOL/L — SIGNIFICANT CHANGE UP (ref 96–108)
CO2 SERPL-SCNC: 28 MMOL/L — SIGNIFICANT CHANGE UP (ref 22–31)
CREAT SERPL-MCNC: 0.51 MG/DL — SIGNIFICANT CHANGE UP (ref 0.5–1.3)
EGFR: 98 ML/MIN/1.73M2 — SIGNIFICANT CHANGE UP
GLUCOSE BLDC GLUCOMTR-MCNC: 189 MG/DL — HIGH (ref 70–99)
GLUCOSE BLDC GLUCOMTR-MCNC: 227 MG/DL — HIGH (ref 70–99)
GLUCOSE BLDC GLUCOMTR-MCNC: 272 MG/DL — HIGH (ref 70–99)
GLUCOSE BLDC GLUCOMTR-MCNC: 317 MG/DL — HIGH (ref 70–99)
GLUCOSE SERPL-MCNC: 232 MG/DL — HIGH (ref 70–99)
HCT VFR BLD CALC: 34.1 % — LOW (ref 34.5–45)
HGB BLD-MCNC: 10.3 G/DL — LOW (ref 11.5–15.5)
INR BLD: 1.12 RATIO — SIGNIFICANT CHANGE UP (ref 0.88–1.16)
MAGNESIUM SERPL-MCNC: 1.9 MG/DL — SIGNIFICANT CHANGE UP (ref 1.6–2.6)
MCHC RBC-ENTMCNC: 21 PG — LOW (ref 27–34)
MCHC RBC-ENTMCNC: 30.2 GM/DL — LOW (ref 32–36)
MCV RBC AUTO: 69.6 FL — LOW (ref 80–100)
METHOD TYPE: SIGNIFICANT CHANGE UP
NRBC # BLD: 0 /100 WBCS — SIGNIFICANT CHANGE UP (ref 0–0)
PHOSPHATE SERPL-MCNC: 2.3 MG/DL — LOW (ref 2.5–4.5)
PLATELET # BLD AUTO: 382 K/UL — SIGNIFICANT CHANGE UP (ref 150–400)
POTASSIUM SERPL-MCNC: 3.7 MMOL/L — SIGNIFICANT CHANGE UP (ref 3.5–5.3)
POTASSIUM SERPL-SCNC: 3.7 MMOL/L — SIGNIFICANT CHANGE UP (ref 3.5–5.3)
PROT SERPL-MCNC: 6.1 G/DL — SIGNIFICANT CHANGE UP (ref 6–8.3)
PROTHROM AB SERPL-ACNC: 12.9 SEC — SIGNIFICANT CHANGE UP (ref 10.5–13.4)
RBC # BLD: 4.9 M/UL — SIGNIFICANT CHANGE UP (ref 3.8–5.2)
RBC # FLD: 16.6 % — HIGH (ref 10.3–14.5)
SODIUM SERPL-SCNC: 139 MMOL/L — SIGNIFICANT CHANGE UP (ref 135–145)
VANCOMYCIN TROUGH SERPL-MCNC: 16.1 UG/ML — SIGNIFICANT CHANGE UP (ref 10–20)
WBC # BLD: 9.98 K/UL — SIGNIFICANT CHANGE UP (ref 3.8–10.5)
WBC # FLD AUTO: 9.98 K/UL — SIGNIFICANT CHANGE UP (ref 3.8–10.5)

## 2022-07-08 PROCEDURE — 99233 SBSQ HOSP IP/OBS HIGH 50: CPT | Mod: GC

## 2022-07-08 PROCEDURE — 99232 SBSQ HOSP IP/OBS MODERATE 35: CPT

## 2022-07-08 RX ORDER — INSULIN LISPRO 100/ML
VIAL (ML) SUBCUTANEOUS AT BEDTIME
Refills: 0 | Status: DISCONTINUED | OUTPATIENT
Start: 2022-07-08 | End: 2022-07-10

## 2022-07-08 RX ORDER — SODIUM,POTASSIUM PHOSPHATES 278-250MG
1 POWDER IN PACKET (EA) ORAL ONCE
Refills: 0 | Status: COMPLETED | OUTPATIENT
Start: 2022-07-08 | End: 2022-07-08

## 2022-07-08 RX ORDER — ERTAPENEM SODIUM 1 G/1
1 INJECTION, POWDER, LYOPHILIZED, FOR SOLUTION INTRAMUSCULAR; INTRAVENOUS
Qty: 0 | Refills: 0 | DISCHARGE
Start: 2022-07-08

## 2022-07-08 RX ORDER — LISINOPRIL 2.5 MG/1
5 TABLET ORAL DAILY
Refills: 0 | Status: DISCONTINUED | OUTPATIENT
Start: 2022-07-08 | End: 2022-07-11

## 2022-07-08 RX ORDER — HUMAN INSULIN 100 [IU]/ML
8 INJECTION, SUSPENSION SUBCUTANEOUS ONCE
Refills: 0 | Status: COMPLETED | OUTPATIENT
Start: 2022-07-08 | End: 2022-07-08

## 2022-07-08 RX ORDER — INSULIN GLARGINE 100 [IU]/ML
14 INJECTION, SOLUTION SUBCUTANEOUS AT BEDTIME
Refills: 0 | Status: DISCONTINUED | OUTPATIENT
Start: 2022-07-08 | End: 2022-07-10

## 2022-07-08 RX ORDER — ERTAPENEM SODIUM 1 G/1
1000 INJECTION, POWDER, LYOPHILIZED, FOR SOLUTION INTRAMUSCULAR; INTRAVENOUS EVERY 24 HOURS
Refills: 0 | Status: DISCONTINUED | OUTPATIENT
Start: 2022-07-08 | End: 2022-07-11

## 2022-07-08 RX ORDER — ATORVASTATIN CALCIUM 80 MG/1
20 TABLET, FILM COATED ORAL AT BEDTIME
Refills: 0 | Status: DISCONTINUED | OUTPATIENT
Start: 2022-07-08 | End: 2022-07-11

## 2022-07-08 RX ORDER — VANCOMYCIN HCL 1 G
1 VIAL (EA) INTRAVENOUS
Qty: 0 | Refills: 0 | DISCHARGE
Start: 2022-07-08

## 2022-07-08 RX ORDER — ERTAPENEM SODIUM 1 G/1
0 INJECTION, POWDER, LYOPHILIZED, FOR SOLUTION INTRAMUSCULAR; INTRAVENOUS
Qty: 0 | Refills: 0 | DISCHARGE
Start: 2022-07-08

## 2022-07-08 RX ORDER — DILTIAZEM HCL 120 MG
1 CAPSULE, EXT RELEASE 24 HR ORAL
Qty: 0 | Refills: 0 | DISCHARGE
Start: 2022-07-08

## 2022-07-08 RX ORDER — TIOTROPIUM BROMIDE 18 UG/1
1 CAPSULE ORAL; RESPIRATORY (INHALATION) DAILY
Refills: 0 | Status: DISCONTINUED | OUTPATIENT
Start: 2022-07-08 | End: 2022-07-11

## 2022-07-08 RX ORDER — DILTIAZEM HCL 120 MG
60 CAPSULE, EXT RELEASE 24 HR ORAL
Refills: 0 | Status: DISCONTINUED | OUTPATIENT
Start: 2022-07-08 | End: 2022-07-11

## 2022-07-08 RX ORDER — INSULIN LISPRO 100/ML
VIAL (ML) SUBCUTANEOUS
Refills: 0 | Status: DISCONTINUED | OUTPATIENT
Start: 2022-07-08 | End: 2022-07-10

## 2022-07-08 RX ADMIN — Medication 3 MILLILITER(S): at 05:01

## 2022-07-08 RX ADMIN — Medication 3 MILLIGRAM(S): at 21:35

## 2022-07-08 RX ADMIN — QUETIAPINE FUMARATE 25 MILLIGRAM(S): 200 TABLET, FILM COATED ORAL at 21:34

## 2022-07-08 RX ADMIN — INSULIN GLARGINE 14 UNIT(S): 100 INJECTION, SOLUTION SUBCUTANEOUS at 22:07

## 2022-07-08 RX ADMIN — CHLORHEXIDINE GLUCONATE 1 APPLICATION(S): 213 SOLUTION TOPICAL at 05:07

## 2022-07-08 RX ADMIN — Medication 100 MILLIGRAM(S): at 13:06

## 2022-07-08 RX ADMIN — Medication 250 MILLIGRAM(S): at 08:47

## 2022-07-08 RX ADMIN — Medication 3: at 07:54

## 2022-07-08 RX ADMIN — HEPARIN SODIUM 1300 UNIT(S)/HR: 5000 INJECTION INTRAVENOUS; SUBCUTANEOUS at 19:25

## 2022-07-08 RX ADMIN — OXYCODONE AND ACETAMINOPHEN 2 TABLET(S): 5; 325 TABLET ORAL at 13:03

## 2022-07-08 RX ADMIN — HEPARIN SODIUM 1300 UNIT(S)/HR: 5000 INJECTION INTRAVENOUS; SUBCUTANEOUS at 01:46

## 2022-07-08 RX ADMIN — MUPIROCIN 1 APPLICATION(S): 20 OINTMENT TOPICAL at 05:01

## 2022-07-08 RX ADMIN — Medication 3 MILLILITER(S): at 18:21

## 2022-07-08 RX ADMIN — Medication 10 MILLIGRAM(S): at 05:07

## 2022-07-08 RX ADMIN — Medication 4: at 16:26

## 2022-07-08 RX ADMIN — HEPARIN SODIUM 1300 UNIT(S)/HR: 5000 INJECTION INTRAVENOUS; SUBCUTANEOUS at 12:43

## 2022-07-08 RX ADMIN — MUPIROCIN 1 APPLICATION(S): 20 OINTMENT TOPICAL at 18:21

## 2022-07-08 RX ADMIN — FAMOTIDINE 20 MILLIGRAM(S): 10 INJECTION INTRAVENOUS at 12:18

## 2022-07-08 RX ADMIN — Medication 100 MILLIGRAM(S): at 21:34

## 2022-07-08 RX ADMIN — ATORVASTATIN CALCIUM 20 MILLIGRAM(S): 80 TABLET, FILM COATED ORAL at 21:35

## 2022-07-08 RX ADMIN — HEPARIN SODIUM 1300 UNIT(S)/HR: 5000 INJECTION INTRAVENOUS; SUBCUTANEOUS at 09:05

## 2022-07-08 RX ADMIN — Medication 60 MILLIGRAM(S): at 18:47

## 2022-07-08 RX ADMIN — Medication 1 PACKET(S): at 08:47

## 2022-07-08 RX ADMIN — Medication 4 UNIT(S): at 11:58

## 2022-07-08 RX ADMIN — Medication 3 MILLILITER(S): at 12:07

## 2022-07-08 RX ADMIN — ERTAPENEM SODIUM 120 MILLIGRAM(S): 1 INJECTION, POWDER, LYOPHILIZED, FOR SOLUTION INTRAMUSCULAR; INTRAVENOUS at 14:00

## 2022-07-08 RX ADMIN — POLYETHYLENE GLYCOL 3350 17 GRAM(S): 17 POWDER, FOR SOLUTION ORAL at 12:07

## 2022-07-08 RX ADMIN — OXYCODONE AND ACETAMINOPHEN 2 TABLET(S): 5; 325 TABLET ORAL at 20:30

## 2022-07-08 RX ADMIN — MEROPENEM 100 MILLIGRAM(S): 1 INJECTION INTRAVENOUS at 06:38

## 2022-07-08 RX ADMIN — HEPARIN SODIUM 1300 UNIT(S)/HR: 5000 INJECTION INTRAVENOUS; SUBCUTANEOUS at 07:21

## 2022-07-08 RX ADMIN — OXYCODONE AND ACETAMINOPHEN 2 TABLET(S): 5; 325 TABLET ORAL at 13:38

## 2022-07-08 RX ADMIN — OXYCODONE AND ACETAMINOPHEN 2 TABLET(S): 5; 325 TABLET ORAL at 19:31

## 2022-07-08 RX ADMIN — HUMAN INSULIN 8 UNIT(S): 100 INJECTION, SUSPENSION SUBCUTANEOUS at 14:28

## 2022-07-08 RX ADMIN — SENNA PLUS 2 TABLET(S): 8.6 TABLET ORAL at 21:35

## 2022-07-08 RX ADMIN — OXYCODONE AND ACETAMINOPHEN 2 TABLET(S): 5; 325 TABLET ORAL at 06:38

## 2022-07-08 RX ADMIN — HEPARIN SODIUM 0 UNIT(S)/HR: 5000 INJECTION INTRAVENOUS; SUBCUTANEOUS at 00:40

## 2022-07-08 RX ADMIN — HEPARIN SODIUM 1300 UNIT(S)/HR: 5000 INJECTION INTRAVENOUS; SUBCUTANEOUS at 15:49

## 2022-07-08 RX ADMIN — Medication 4 UNIT(S): at 16:26

## 2022-07-08 RX ADMIN — Medication 4 UNIT(S): at 07:55

## 2022-07-08 RX ADMIN — Medication 4: at 11:57

## 2022-07-08 RX ADMIN — Medication 250 MILLIGRAM(S): at 21:35

## 2022-07-08 NOTE — PROGRESS NOTE ADULT - SUBJECTIVE AND OBJECTIVE BOX
Patient is a 71y old  Female who presents with a chief complaint of Anaphylaxis (08 Jul 2022 07:32)       INTERVAL HPI/OVERNIGHT EVENTS:  Patient seen and evaluated at bedside.  Pt is resting comfortable in NAD. Denies N/V/F/C.    Allergies    aspirin (Short breath)  Avelox (Short breath; Pruritus)  cefepime (Anaphylaxis)  codeine (Short breath)  Dilaudid (Short breath)  iodine (Short breath; Swelling)  penicillin (Anaphylaxis)  shellfish (Anaphylaxis)  tetanus toxoid (Short breath)  Valium (Short breath)    Intolerances        Vital Signs Last 24 Hrs  T(C): 37 (08 Jul 2022 12:28), Max: 37 (08 Jul 2022 08:29)  T(F): 98.6 (08 Jul 2022 12:28), Max: 98.6 (08 Jul 2022 08:29)  HR: 89 (08 Jul 2022 12:28) (82 - 96)  BP: 128/71 (08 Jul 2022 12:28) (128/71 - 181/77)  BP(mean): --  RR: 18 (08 Jul 2022 12:28) (18 - 18)  SpO2: 97% (08 Jul 2022 12:28) (94% - 98%)    Parameters below as of 08 Jul 2022 12:28  Patient On (Oxygen Delivery Method): room air        LABS:                        10.3   9.98  )-----------( 382      ( 08 Jul 2022 06:36 )             34.1     07-08    139  |  101  |  10  ----------------------------<  232<H>  3.7   |  28  |  0.51    Ca    9.0      08 Jul 2022 06:34  Phos  2.3     07-08  Mg     1.9     07-08    TPro  6.1  /  Alb  3.1<L>  /  TBili  0.2  /  DBili  x   /  AST  13  /  ALT  22  /  AlkPhos  78  07-08    PT/INR - ( 08 Jul 2022 06:36 )   PT: 12.9 sec;   INR: 1.12 ratio         PTT - ( 08 Jul 2022 08:31 )  PTT:62.3 sec    CAPILLARY BLOOD GLUCOSE      POCT Blood Glucose.: 317 mg/dL (08 Jul 2022 11:53)  POCT Blood Glucose.: 272 mg/dL (08 Jul 2022 07:48)  POCT Blood Glucose.: 120 mg/dL (07 Jul 2022 21:12)  POCT Blood Glucose.: 325 mg/dL (07 Jul 2022 16:22)      Lower Extremity Physical Exam:  Vascular: DP/PT 2/4 B/L, CFT <3 seconds B/L, Temperature gradient warm to cool B/L  Neuro: Epicritic sensation intact to the level of midfoot B/L  Musculoskeletal/Ortho: unremarkable   Skin: right foot medial navicular wound to bone w resolving erythema periwound, no malodor, no bogginess, no drainage noted, no tracking noted      RADIOLOGY & ADDITIONAL TESTS:

## 2022-07-08 NOTE — PROGRESS NOTE ADULT - PROBLEM SELECTOR PLAN 9
- Bowel regimen: Continue home senna, miralax, and lactulose  - Home eliquis held due to podiatry procedure 7/6 and potential upcoming PICC placement  - Diet: Carb consistent  - PT/OT  - Pain management: Percocet PRN for severe pain, Tylenol PRN for moderate pain   - Blood draws through mediport - Lisinopril  - Home cardiazam 60 mg BID

## 2022-07-08 NOTE — PROGRESS NOTE ADULT - ASSESSMENT
71F s/p right foot navicular bone biopsy, wound debridement and heel I&D  - pt seen and evaluated  - Afebrile, WBC 10.8  - s/p right foot navicular bone biopsy, wound debridement and heel I&D, warm and viable, no pus, no SOI  - high concern residual bone infection, low concern vaibility  - OR data showing Proetus  - ID recs Vanco via PICC for 6 weeks, appreacited  - pod stable for d/c pending PICC placement  - Seen w/ attending

## 2022-07-08 NOTE — PROGRESS NOTE ADULT - PROBLEM SELECTOR PLAN 3
- Hx of chronic atrial fibrillation on Eliquis   - Episode of Vtach w/pulse following epi treatment for medication-induced anaphylaxis, requiring lidocaine for    - ON aPTT >200, heparin held; aPTT this AM 26, heparin drip adjusted from 9-12 ml/hr per nomogram - Hx of chronic atrial fibrillation on Eliquis   - Episode of Vtach w/pulse following epi treatment for medication-induced anaphylaxis, requiring lidocaine for    - Heparin drip

## 2022-07-08 NOTE — PROGRESS NOTE ADULT - ASSESSMENT
71 year old female with PMH DM, COPD (chronic asthma), history of Chronic Adrenal Insufficiency on Chronic prednisone history of colorectal cancer s/p resection (colostomy bag), Hx of CAD, Chronic A fib on Eliquis, and tracheomalacia and multiple intubations presenting with right foot wound concerning for OM s/p wound care. Hospital course complicated by medicine-induced anaphylaxis 2/2 cefepime and V-tach w/pulse after epi administration, and patient was intubated and sedated for hypoxia and airway protection. Patient has since been extubated and she is s/p right foot incision and drainage and wound debridement 7/6 with Dr. Valenzuela. This AM she is resting comfortably in bed, pleasant, and appropriate, however is reporting continued pain at procedure site.       71 year old female with PMH DM, COPD (chronic asthma), history of Chronic Adrenal Insufficiency on Chronic prednisone history of colorectal cancer s/p resection (colostomy bag), Hx of CAD, Chronic A fib on Eliquis, and tracheomalacia and multiple intubations presenting with right foot wound concerning for OM s/p wound care. Hospital course complicated by medicine-induced anaphylaxis 2/2 cefepime and V-tach w/pulse after epi administration, and patient was intubated and sedated for hypoxia and airway protection. Patient has since been extubated and she is s/p right foot incision and drainage and wound debridement 7/6 with Dr. Valenzuela. This AM she is resting comfortably in bed, pleasant, and appropriate, with pain well controlled on Percocet x2 q6; OR cultures still pending however per podiatry recs is prepared for d/c pending PICC placement with 6w course of vanc and ertapenem required for tx of osteomyelitis.

## 2022-07-08 NOTE — PROVIDER CONTACT NOTE (CRITICAL VALUE NOTIFICATION) - ACTION/TREATMENT ORDERED:
Teams MD Terrell Tadeo notified. Ordered to follow nomogram; hold heparin for 1 hour, resume at 13 mL/hr. Next ptt at 07:16

## 2022-07-08 NOTE — PROGRESS NOTE ADULT - PROBLEM SELECTOR PLAN 6
- Pt with productive cough since pre-admission, reports is consistent with her "usual asthma"   - No subjective difficulty breathing, satting well on RA  - Coarse lung sounds on expiration  - Thang  - Incentive spirometry - Pt with nonproductive cough since pre-admission, reports is consistent with her "usual asthma"   - No subjective difficulty breathing, satting well on RA  - Coarse lung sounds on expiration  - Duoneb 3 ml q6 and   - Benzonatate 100 mg TID for cough   - Spiriva inhaler   - Incentive spirometry

## 2022-07-08 NOTE — PROGRESS NOTE ADULT - PROBLEM SELECTOR PLAN 8
- S/p resection with colostomy   - Monitor ostomy output  - Ostomy care - Phos 2.3 this AM; repleted

## 2022-07-08 NOTE — PROGRESS NOTE ADULT - ASSESSMENT
71 f with DM, CAD, a-fib, asthma/COPD, Chronic Adrenal Insufficiency on steroids, history of colorectal cancer s/p resection and ostomy, tracheomalacia and multiple intubations, p/w R foot wound  febrile to 100.7, was given vanco and cefepime and after cefepime had a ?anaphylaxis with hypotension, tachycardia, swelling, hypoxia was intubated   WBC increased to 22, wound cx: MRSA, ESBL proteus and corynebacterium  foot MRI 7/3: Soft tissue wound with osteomyelitis of the navicular,  plantar heel abscess  s/p I&D and resection of navicular bone 7/6 but high suspicion for residual osteo    leukocytosis, fever, sepsis, diabetic foot infection, abscess and osteo  ?anaphylaxis to cefepime in ER  adrenal insufficiency on steroids     * f/u the final OR cultures  * c/w vanco 1 q 12 and ertapenem 1 qd  * monitor vanco trough and renal function to prevent nephrotoxicity  * will need a picc line and 6 weeks of antibiotics until 8/17  * weekly CBC, CMP, vanco trough while on antibiotics      The above assessment and plan was discussed with the primary team    Michelle Rolon MD  contact on teams  After 5pm and on weekends call [phone]

## 2022-07-08 NOTE — PROGRESS NOTE ADULT - ATTENDING COMMENTS
#foot osteomyelitis  continue Vanc, switch Kevin to Ertapenem per ID recs. check vanc trough daily  awaiting OR bone cx  IR consult for PICC line  F/U podiatry for further recs  pain management - percocet prn, tylenol prn. monitor ostomy output    #chronic adrenal insufficiency  home dose methylprednisolone     #CAD, chronic afib  on heparin gtt for AC, plan to switch to home eliquis once PICC line is in place     #hypertension  ?if patient is supposed to be on cardizem>would review outpatient records and resume med if appropriate   start lisinopril. monitor BP     #DM type 2-uncontrolled  hyperglycemia on home insulin regimen  increase lantus to 14u hs, continue admelog 4u tidacmeals. monitor fs, goal 100-180    #?hypoxia, asthma, dry cough  weaned off supplemental oxygen  continue duonebs, start tessalon.  incentive spirometer    -PT eval - rec [facility]  Discharge planning

## 2022-07-08 NOTE — PROGRESS NOTE ADULT - PROBLEM SELECTOR PLAN 1
- S/p I&D and debridement of right foot osteomyelitis and abscess on 7/6 with Dr. Valenzuela and Podiatry team   - ID recommendations: pt will require 6 weeks IV Abx with vancomycin 1g q12 and ertapenem 1g qd, thus requiring PICC placement upon d/c. Start date for Abx is considered 7/6/22  -- Pt will also require weekly trough, CBC, and serum Cr while receiving IV Abx  - Continue vancomycin 1g  BID and meropenem 1g TID for osteomyelitis tx  - Vanc trough before every AM dose to clarify serum vanc levels, as most recent vanc trough lower than recommended for MRSA treatment (12.3)  - OR culture pending - S/p I&D and debridement of right foot osteomyelitis and abscess on 7/6 with Dr. Vaelnzuela and Podiatry team   - ID recommendations: 6 weeks IV Abx with vancomycin 1g q12 and ertapenem 1g qd, thus requiring PICC placement upon d/c, order placed. Start date for Abx is considered 7/6/22  -- Pt will also require weekly trough, CBC, and serum Cr while receiving IV Abx  - Vanc trough before every AM dose to clarify serum vanc levels, most recent vanc trough 16.1  - OR culture pending but likely the same as surgical swab (Proteus and MRSA)

## 2022-07-08 NOTE — PROGRESS NOTE ADULT - SUBJECTIVE AND OBJECTIVE BOX
Follow Up:  diabetic foot infection and osteo    Interval History: pt afebrile and normal WBC    ROS:      All other systems negative    Constitutional: no fever, no chills  Cardiovascular:  no chest pain, no palpitation  Respiratory:  no SOB, no cough  GI:  no abd pain, no vomiting, no diarrhea  urinary: no dysuria, no hematuria, no flank pain  musculoskeletal:  R foot wound and pain  skin:  no rash  neurology:  no headache, no seizur    Allergies  aspirin (Short breath)  Avelox (Short breath; Pruritus)  cefepime (Anaphylaxis)  codeine (Short breath)  Dilaudid (Short breath)  iodine (Short breath; Swelling)  penicillin (Anaphylaxis)  shellfish (Anaphylaxis)  tetanus toxoid (Short breath)  Valium (Short breath)        ANTIMICROBIALS:  ertapenem  IVPB 1000 every 24 hours  vancomycin  IVPB 1000 every 12 hours      OTHER MEDS:  acetaminophen     Tablet .. 650 milliGRAM(s) Oral every 6 hours PRN  albuterol/ipratropium for Nebulization 3 milliLiter(s) Nebulizer every 6 hours  benzonatate 100 milliGRAM(s) Oral three times a day  chlorhexidine 2% Cloths 1 Application(s) Topical <User Schedule>  dextrose 5%. 1000 milliLiter(s) IV Continuous <Continuous>  dextrose 5%. 1000 milliLiter(s) IV Continuous <Continuous>  dextrose 50% Injectable 25 Gram(s) IV Push once  dextrose 50% Injectable 12.5 Gram(s) IV Push once  dextrose 50% Injectable 25 Gram(s) IV Push once  dextrose Oral Gel 15 Gram(s) Oral once PRN  famotidine Injectable 20 milliGRAM(s) IV Push daily  glucagon  Injectable 1 milliGRAM(s) IntraMuscular once  heparin  Infusion.  Unit(s)/Hr IV Continuous <Continuous>  insulin glargine Injectable (LANTUS) 14 Unit(s) SubCutaneous at bedtime  insulin lispro (ADMELOG) corrective regimen sliding scale   SubCutaneous three times a day before meals  insulin lispro (ADMELOG) corrective regimen sliding scale   SubCutaneous at bedtime  insulin lispro Injectable (ADMELOG) 4 Unit(s) SubCutaneous three times a day before meals  insulin NPH human recombinant 8 Unit(s) SubCutaneous once  melatonin 3 milliGRAM(s) Oral at bedtime  methylPREDNISolone sodium succinate Injectable 10 milliGRAM(s) IV Push daily  mupirocin 2% Nasal 1 Application(s) Both Nostrils two times a day  oxycodone    5 mG/acetaminophen 325 mG 2 Tablet(s) Oral every 6 hours PRN  polyethylene glycol 3350 17 Gram(s) Oral daily  QUEtiapine 25 milliGRAM(s) Oral at bedtime PRN  senna 2 Tablet(s) Oral at bedtime      Vital Signs Last 24 Hrs  T(C): 37 (08 Jul 2022 12:28), Max: 37 (08 Jul 2022 08:29)  T(F): 98.6 (08 Jul 2022 12:28), Max: 98.6 (08 Jul 2022 08:29)  HR: 89 (08 Jul 2022 12:28) (82 - 96)  BP: 128/71 (08 Jul 2022 12:28) (128/71 - 181/77)  BP(mean): --  RR: 18 (08 Jul 2022 12:28) (18 - 18)  SpO2: 97% (08 Jul 2022 12:28) (94% - 98%)    Parameters below as of 08 Jul 2022 12:28  Patient On (Oxygen Delivery Method): room air        Physical Exam:  General:    NAD,  non toxic  Respiratory:  comfortable on RA  abd:     soft,   BS +,   no tenderness  :   no CVAT,  no suprapubic tenderness,   no  ramirez  Musculoskeletal:   R foot wound s/p I&D now clean, some surrounding tenderness  vascular: no phlebitis  Skin:    no rash                          10.3   9.98  )-----------( 382      ( 08 Jul 2022 06:36 )             34.1       07-08    139  |  101  |  10  ----------------------------<  232<H>  3.7   |  28  |  0.51    Ca    9.0      08 Jul 2022 06:34  Phos  2.3     07-08  Mg     1.9     07-08    TPro  6.1  /  Alb  3.1<L>  /  TBili  0.2  /  DBili  x   /  AST  13  /  ALT  22  /  AlkPhos  78  07-08          MICROBIOLOGY:  Vancomycin Level, Trough: 16.1 ug/mL (07-08-22 @ 06:37)  v  .Tissue Other  07-06-22   Rare Proteus mirabilis  --    Few polymorphonuclear leukocytes seen per low power field  No organisms seen per oil power field      ET Tube ET Tube  07-03-22   Moderate Proteus mirabilis ESBL  Normal Respiratory Jessica present  --  Proteus mirabilis ESBL      .Blood Blood-Peripheral  07-02-22   No Growth Final  --  --      .Abscess R foot wound  07-02-22   Numerous Proteus mirabilis ESBL  Numerous Methicillin Resistant Staphylococcus aureus  Moderate Corynebacterium species "Susceptibilities not performed"  --  Proteus mirabilis ESBL  Methicillin resistant Staphylococcus aureus      Clean Catch Clean Catch (Midstream)  07-02-22   <10,000 CFU/mL Normal Urogenital Jessica  --  --      .Blood Blood-Peripheral  07-02-22   No Growth Final  --  --                RADIOLOGY:  Images independently visualized and reviewed personally, findings as below  < from: Xray Foot AP + Lateral, Right (07.06.22 @ 15:08) >  IMPRESSION:  Postsurgical soft tissue and focal osseous changes in medial navicular   region.    No proximally tracking gas collections or focal areas of osteomyelitis.    Remainder of foot unchanged.    Also correlate with intraoperative findings.      < end of copied text >  < from: MR Foot w/wo IV Cont, Right (07.03.22 @ 23:43) >  IMPRESSION:  1. Soft tissue wound along the medial margin of the navicular with   changes concerning for underlying osteomyelitis of the navicular.  2. Small ill-defined subcutaneous fluid collection of the plantar heel   fat-pad that may reflect an adventitial bursa versus developing abscess   measuring up to 1.0 cm in size. Correlation is suggested for signs of   infection in this location.    < end of copied text >

## 2022-07-08 NOTE — PROGRESS NOTE ADULT - SUBJECTIVE AND OBJECTIVE BOX
** incomplete ** MICU Course: 71 year old female with PMH DM, COPD (chronic asthma), history of Chronic Adrenal Insufficiency on Chronic prednisone history of colorectal cancer s/p resection (colostomy bag), Hx of CAD, Chronic A fib on Eliquis, bronchiectasis, and tracheomalacia and multiple intubations presenting with right foot wound concerning for OM s/p wound care. Course complicated by medication induced anaphylaxis 2/2 cefepime in setting of penicillin allergy. Further complicated by V-tach with pulse after epi administration, requiring lidocaine for . Patient was intubated and sedated for hypoxia and airway protection. Pt was successfully extubated, with improving mental status on nasal cannula. MRI showed osteomyelitis and developing abscess in R foot, with wound cultures showing MRSA and proteus mirabilis. Patient was continued on meropenem and vancomycin. Podiatry is following this patient, she is s/p right foot incision and drainage and wound debridement  with Dr. Valenzuela.    Patient is now hemodynamically stable and has been transferred to medicine floor. She was examined at bedside. Pt subjectively reports that her right foot is in pain, and does not feel her current pain regimen is effectively addressing her pain. She additionally has several sites on her wrists and arms from tape and bandages that are uncomfortable. Pt is breathing comfortably on 3L NC and is amenable to down titrating O2. She denies fever, chills, n/v/d, CP, and shortness of breath. Right foot dressing is clean and dry with no drainage. Pt reports that her daughter is looking at [place] locations.     REVIEW OF SYSTEMS:    CONSTITUTIONAL: No weakness, fevers or chills  EYES/ENT: No visual changes;  No vertigo or throat pain   NECK: No pain or stiffness  RESPIRATORY: No cough, wheezing, hemoptysis; No shortness of breath  CARDIOVASCULAR: No chest pain or palpitations  GASTROINTESTINAL: No abdominal or epigastric pain. No nausea, vomiting, or hematemesis; No diarrhea or constipation. No melena or hematochezia.  GENITOURINARY: No dysuria, frequency or hematuria  NEUROLOGICAL: No numbness or weakness  SKIN: No itching, rashes    MEDICATIONS  (STANDING):  albuterol/ipratropium for Nebulization 3 milliLiter(s) Nebulizer every 6 hours  chlorhexidine 2% Cloths 1 Application(s) Topical <User Schedule>  dextrose 5%. 1000 milliLiter(s) (50 mL/Hr) IV Continuous <Continuous>  dextrose 5%. 1000 milliLiter(s) (100 mL/Hr) IV Continuous <Continuous>  dextrose 50% Injectable 25 Gram(s) IV Push once  dextrose 50% Injectable 12.5 Gram(s) IV Push once  dextrose 50% Injectable 25 Gram(s) IV Push once  famotidine Injectable 20 milliGRAM(s) IV Push daily  glucagon  Injectable 1 milliGRAM(s) IntraMuscular once  heparin  Infusion.  Unit(s)/Hr (11 mL/Hr) IV Continuous <Continuous>  insulin glargine Injectable (LANTUS) 10 Unit(s) SubCutaneous at bedtime  insulin lispro (ADMELOG) corrective regimen sliding scale   SubCutaneous at bedtime  insulin lispro (ADMELOG) corrective regimen sliding scale   SubCutaneous three times a day before meals  insulin lispro Injectable (ADMELOG) 4 Unit(s) SubCutaneous three times a day before meals  melatonin 3 milliGRAM(s) Oral at bedtime  meropenem  IVPB 1000 milliGRAM(s) IV Intermittent every 8 hours  methylPREDNISolone sodium succinate Injectable 10 milliGRAM(s) IV Push daily  mupirocin 2% Nasal 1 Application(s) Both Nostrils two times a day  polyethylene glycol 3350 17 Gram(s) Oral daily  senna 2 Tablet(s) Oral at bedtime  vancomycin  IVPB 1000 milliGRAM(s) IV Intermittent every 12 hours    MEDICATIONS  (PRN):  acetaminophen     Tablet .. 650 milliGRAM(s) Oral every 6 hours PRN Temp greater or equal to 38C (100.4F)  dextrose Oral Gel 15 Gram(s) Oral once PRN Blood Glucose LESS THAN 70 milliGRAM(s)/deciliter  oxycodone    5 mG/acetaminophen 325 mG 2 Tablet(s) Oral every 6 hours PRN Severe Pain (7 - 10)  QUEtiapine 25 milliGRAM(s) Oral at bedtime PRN agitation    VITALS:   T(C): 36.9 (22 @ 03:15), Max: 37.3 (22 @ 14:00)  HR: 81 (22 @ 03:15) (72 - 97)  BP: 162/69 (22 @ 03:15) (151/67 - 179/73)  RR: 18 (22 @ 03:15) (17 - 28)  SpO2: 99% (22 @ 03:15) (95% - 99%)    GENERAL: NAD, lying in bed comfortably  HEAD:  Atraumatic, normocephalic  EYES: EOMI, PERRLA, conjunctiva and sclera clear  ENT: Moist mucous membranes  NECK: Supple, no JVD  HEART: Regular rate and rhythm, no murmurs, rubs, or gallops  LUNGS: Unlabored respirations.  Coarse lung sounds on expiration. No wheezing.   ABDOMEN: Soft, nontender, nondistended, +BS  EXTREMITIES: Right foot wrapped in elastic bandage with no apparent bleeding or drainage. 2+ peripheral pulses bilaterally. No clubbing, cyanosis, or edema  NERVOUS SYSTEM:  A&Ox3, no focal deficits   SKIN: No rashes or lesions                          9.6    10.87 )-----------( 327      ( 2022 22:52 )             32.1   07-    139  |  103  |  13  ----------------------------<  306<H>  4.2   |  25  |  0.54    Ca    8.3<L>      2022 17:00  Phos  3.1     07-06  Mg     2.3     07-06    TPro  6.5  /  Alb  3.4  /  TBili  0.3  /  DBili  x   /  AST  39  /  ALT  33  /  AlkPhos  87  07-06    PT/INR - ( 2022 00:18 )   PT: 13.7 sec;   INR: 1.18 ratio         PTT - ( 2022 22:52 )  PTT:>200.0 sec    RADIOLOGY & ADDITIONAL TESTS:     < from: Xray Foot AP + Lateral, Right (22 @ 15:08) >    INTERPRETATION:  CLINICAL INDICATION: baseline postoperative evaluation   status post right foot debridement    EXAM:  Frontal and lateral right foot from 2022 at 1508. Compared to   preoperative study from 2022.    IMPRESSION:  Postsurgical soft tissue and focal osseous changes in medial navicular   region.    No proximally tracking gas collections or focal areas of osteomyelitis.    Remainder of foot unchanged.    Also correlate with intraoperative findings.    --- End of Report ---    < from: MR Foot w/wo IV Cont, Right (22 @ 23:43) >    INTERPRETATION:  MR HINDFOOT/ANKLE WITHOUT AND WITH IV CONTRAST RIGHT    HISTORY:  Diabetic foot swelling. Concern for osteomyelitis. Wound.    TECHNIQUE:  Multiplanar MRI of the right hindfoot/ankle was performed   without and with 5.5cc cc administered Gadavist intravenous gadolinium   contrast using 10 sequences.    COMPARISON:  Right foot x-rays dated 2022 and MRI dated 2022    FINDINGS:    OSSEOUS STRUCTURES    Acute Fractures/Contusions:  None.    Chronic Fractures/Ossifications:  None.    Marrow:  Soft tissue wound is present along the medial margin of the   navicular with underlying marrow edema with mild T1 marrow replacement   and enhancement of the medial margin of the navicular concerning for   osteomyelitis.    Tarsal Coalition:  None.    LIGAMENTS    Talofibular/Calcaneofibular Ligaments:  Intact.    Tibiofibular/Syndesmotic Ligaments:  Intact.    Medial Deltoid Ligament Complex:  Intact.    Spring/Subtalar Ligaments:  Intact.    TENDONS    Posterior Tibialis/Medial Flexor Tendons:  Intact.    Peroneal Tendons:  Intact.    Extensor Tendons:  Intact.    ACHILLES TENDON  Intact.    PLANTAR FASCIA  Intact.    JOINTS    Tibiotalar Joint:  Preserved.    Subtalar Joints:  Preserved.    Intertarsal Joints:  Preserved.    Tarsometatarsal Joints:  Preserved.    SOFT TISSUES    Masses/Cysts:  None.    Musculature:  Intact.    Subcutaneous Tissues:  Small ill-defined fluid collection is present   within the plantar heel fat-pad suggesting an adventitial bursa versus   developing abscess measuring up to 1 cm in size near the calcaneal   tuberosity and plantar fascia. There is mild surrounding enhancement.   Mild subcutaneous edema is present.    NEUROVASCULAR STRUCTURES    Tarsal Tunnel:  Preserved.    IMPRESSION:  1. Soft tissue wound along the medial margin of the navicular with   changes concerning for underlying osteomyelitis of the navicular.  2. Small ill-defined subcutaneous fluid collection of the plantar heel   fat-pad that may reflect an adventitial bursa versus developing abscess   measuring up to 1.0 cm in size. Correlation is suggested for signs of   infection in this location.    --- End of Report ---      < end of copied text >            MICU Course: 71 year old female with PMH DM, COPD (chronic asthma), history of Chronic Adrenal Insufficiency on Chronic prednisone history of colorectal cancer s/p resection (colostomy bag), Hx of CAD, Chronic A fib on Eliquis, bronchiectasis, and tracheomalacia and multiple intubations presenting with right foot wound concerning for OM s/p wound care. Course complicated by medication induced anaphylaxis 2/2 cefepime in setting of penicillin allergy. Further complicated by V-tach with pulse after epi administration, requiring lidocaine for . Patient was intubated and sedated for hypoxia and airway protection. Pt was successfully extubated, with improving mental status on nasal cannula. MRI showed osteomyelitis and developing abscess in R foot, with wound cultures showing MRSA and proteus mirabilis. Patient was continued on meropenem and vancomycin. Podiatry is following this patient, she is s/p right foot incision and drainage and wound debridement  with Dr. Valenzuela. She was transferred to medicine floor on .     No ON events.  Pt reports that her R foot pain has subsided since her pain medication changes. . She denies fever, chills, n/v/d, constipation CP, and shortness of breath. Right foot dressing is clean and dry with no drainage. Pt is interested in [facility] as [facility] option but her daughter had surgery today and will call soon to see if there is bed availability.    REVIEW OF SYSTEMS:    CONSTITUTIONAL: No weakness, fevers or chills  EYES/ENT: No visual changes;  No vertigo or throat pain   NECK: No pain or stiffness  RESPIRATORY: + Nonproductive cough. No wheezing, hemoptysis, no shortness of breath  CARDIOVASCULAR: No chest pain or palpitations  GASTROINTESTINAL: No abdominal or epigastric pain. No nausea, vomiting, or hematemesis; No diarrhea or constipation. No melena or hematochezia.  GENITOURINARY: No dysuria, frequency or hematuria  NEUROLOGICAL: No numbness or weakness  SKIN: No itching, rashes    MEDICATIONS  (STANDING):  albuterol/ipratropium for Nebulization 3 milliLiter(s) Nebulizer every 6 hours  benzonatate 100 milliGRAM(s) Oral three times a day  chlorhexidine 2% Cloths 1 Application(s) Topical <User Schedule>  dextrose 5%. 1000 milliLiter(s) (50 mL/Hr) IV Continuous <Continuous>  dextrose 5%. 1000 milliLiter(s) (100 mL/Hr) IV Continuous <Continuous>  dextrose 50% Injectable 25 Gram(s) IV Push once  dextrose 50% Injectable 12.5 Gram(s) IV Push once  dextrose 50% Injectable 25 Gram(s) IV Push once  ertapenem  IVPB 1000 milliGRAM(s) IV Intermittent every 24 hours  famotidine Injectable 20 milliGRAM(s) IV Push daily  glucagon  Injectable 1 milliGRAM(s) IntraMuscular once  heparin  Infusion.  Unit(s)/Hr (11 mL/Hr) IV Continuous <Continuous>  insulin glargine Injectable (LANTUS) 14 Unit(s) SubCutaneous at bedtime  insulin lispro (ADMELOG) corrective regimen sliding scale   SubCutaneous three times a day before meals  insulin lispro (ADMELOG) corrective regimen sliding scale   SubCutaneous at bedtime  insulin lispro Injectable (ADMELOG) 4 Unit(s) SubCutaneous three times a day before meals  lisinopril 5 milliGRAM(s) Oral daily  melatonin 3 milliGRAM(s) Oral at bedtime  methylPREDNISolone sodium succinate Injectable 10 milliGRAM(s) IV Push daily  mupirocin 2% Nasal 1 Application(s) Both Nostrils two times a day  polyethylene glycol 3350 17 Gram(s) Oral daily  senna 2 Tablet(s) Oral at bedtime  vancomycin  IVPB 1000 milliGRAM(s) IV Intermittent every 12 hours    MEDICATIONS  (PRN):  acetaminophen     Tablet .. 650 milliGRAM(s) Oral every 6 hours PRN Temp greater or equal to 38C (100.4F)  dextrose Oral Gel 15 Gram(s) Oral once PRN Blood Glucose LESS THAN 70 milliGRAM(s)/deciliter  oxycodone    5 mG/acetaminophen 325 mG 2 Tablet(s) Oral every 6 hours PRN Severe Pain (7 - 10)  QUEtiapine 25 milliGRAM(s) Oral at bedtime PRN agitation  Vital Signs Last 24 Hrs  T(C): 37 (2022 12:28), Max: 37 (2022 08:29)  T(F): 98.6 (2022 12:28), Max: 98.6 (2022 08:29)  HR: 89 (2022 12:28) (82 - 96)  BP: 128/71 (2022 12:28) (128/71 - 181/77)  RR: 18 (2022 12:28) (18 - 18)  SpO2: 97% (2022 12:28) (94% - 98%)    Parameters below as of 2022 12:28  Patient On (Oxygen Delivery Method): room air    GENERAL: NAD, lying in bed comfortably  HEAD:  Atraumatic, normocephalic  EYES: EOMI, PERRLA, conjunctiva and sclera clear. Mild exophthalmos bilaterally   ENT: Moist mucous membranes  NECK: Supple, no JVD  HEART: Regular rate and rhythm, no murmurs, rubs, or gallops  LUNGS: Unlabored respirations.  Coarse lung sounds on expiration. No wheezing.   ABDOMEN: Soft, nontender, nondistended, +BS, ostomy site clean with no sign of infection   EXTREMITIES: Right foot wrapped in elastic bandage with no apparent bleeding or drainage. 2+ peripheral pulses bilaterally. No clubbing, cyanosis, or edema  NERVOUS SYSTEM:  A&Ox3, no focal deficits   SKIN: No rashes or lesions                                     10.3   9.98  )-----------( 382      ( 2022 06:36 )             34.1   07-08    139  |  101  |  10  ----------------------------<  232<H>  3.7   |  28  |  0.51    Ca    9.0      2022 06:34  Phos  2.3     07-08  Mg     1.9     07-08    TPro  6.1  /  Alb  3.1<L>  /  TBili  0.2  /  DBili  x   /  AST  13  /  ALT  22  /  AlkPhos  78  07-08    Mg 07-08: 1.9  Phos 07-08: 2.3     PT/INR - ( 2022 06:36 )   PT: 12.9 sec;   INR: 1.12 ratio    PTT - ( 2022 14:53 )  PTT:80.1 sec    CAPILLARY BLOOD GLUCOSE  POCT Blood Glucose.: 317 mg/dL (2022 11:53)  POCT Blood Glucose.: 272 mg/dL (2022 07:48)  POCT Blood Glucose.: 120 mg/dL (2022 21:12)  POCT Blood Glucose.: 325 mg/dL (2022 16:22)       MICROBIOLOGY:  Vancomycin Level, Trough: 16.1 ug/mL (22 @ 06:37)  v  .Tissue Other  22   Rare Proteus mirabilis  --    Few polymorphonuclear leukocytes seen per low power field  No organisms seen per oil power field    ET Tube ET Tube  22   Moderate Proteus mirabilis ESBL  Normal Respiratory Jessica present  --  Proteus mirabilis ESBL      .Blood Blood-Peripheral  22   No Growth Final  --  --      .Abscess R foot wound  22   Numerous Proteus mirabilis ESBL  Numerous Methicillin Resistant Staphylococcus aureus  Moderate Corynebacterium species "Susceptibilities not performed"  --  Proteus mirabilis ESBL  Methicillin resistant Staphylococcus aureus      Clean Catch Clean Catch (Midstream)  22   <10,000 CFU/mL Normal Urogenital Jessica  --  --      .Blood Blood-Peripheral  22   No Growth Final  --  --    RADIOLOGY & ADDITIONAL TESTS:     < from: Xray Foot AP + Lateral, Right (22 @ 15:08) >    INTERPRETATION:  CLINICAL INDICATION: baseline postoperative evaluation   status post right foot debridement    EXAM:  Frontal and lateral right foot from 2022 at 1508. Compared to   preoperative study from 2022.    IMPRESSION:  Postsurgical soft tissue and focal osseous changes in medial navicular   region.    No proximally tracking gas collections or focal areas of osteomyelitis.    Remainder of foot unchanged.    Also correlate with intraoperative findings.    --- End of Report ---    < from: MR Foot w/wo IV Cont, Right (22 @ 23:43) >    INTERPRETATION:  MR HINDFOOT/ANKLE WITHOUT AND WITH IV CONTRAST RIGHT    HISTORY:  Diabetic foot swelling. Concern for osteomyelitis. Wound.    TECHNIQUE:  Multiplanar MRI of the right hindfoot/ankle was performed   without and with 5.5cc cc administered Gadavist intravenous gadolinium   contrast using 10 sequences.    COMPARISON:  Right foot x-rays dated 2022 and MRI dated 2022    FINDINGS:    OSSEOUS STRUCTURES    Acute Fractures/Contusions:  None.    Chronic Fractures/Ossifications:  None.    Marrow:  Soft tissue wound is present along the medial margin of the   navicular with underlying marrow edema with mild T1 marrow replacement   and enhancement of the medial margin of the navicular concerning for   osteomyelitis.    Tarsal Coalition:  None.    LIGAMENTS    Talofibular/Calcaneofibular Ligaments:  Intact.    Tibiofibular/Syndesmotic Ligaments:  Intact.    Medial Deltoid Ligament Complex:  Intact.    Spring/Subtalar Ligaments:  Intact.    TENDONS    Posterior Tibialis/Medial Flexor Tendons:  Intact.    Peroneal Tendons:  Intact.    Extensor Tendons:  Intact.    ACHILLES TENDON  Intact.    PLANTAR FASCIA  Intact.    JOINTS    Tibiotalar Joint:  Preserved.    Subtalar Joints:  Preserved.    Intertarsal Joints:  Preserved.    Tarsometatarsal Joints:  Preserved.    SOFT TISSUES    Masses/Cysts:  None.    Musculature:  Intact.    Subcutaneous Tissues:  Small ill-defined fluid collection is present   within the plantar heel fat-pad suggesting an adventitial bursa versus   developing abscess measuring up to 1 cm in size near the calcaneal   tuberosity and plantar fascia. There is mild surrounding enhancement.   Mild subcutaneous edema is present.    NEUROVASCULAR STRUCTURES    Tarsal Tunnel:  Preserved.    IMPRESSION:  1. Soft tissue wound along the medial margin of the navicular with   changes concerning for underlying osteomyelitis of the navicular.  2. Small ill-defined subcutaneous fluid collection of the plantar heel   fat-pad that may reflect an adventitial bursa versus developing abscess   measuring up to 1.0 cm in size. Correlation is suggested for signs of   infection in this location.    --- End of Report ---      < end of copied text >

## 2022-07-08 NOTE — PROGRESS NOTE ADULT - PROBLEM SELECTOR PLAN 4
- Insulin dosing modified due to elevated POCT glucose; plan for 10 U Lantus for basal insulin and 4 U Admelog pre-meal  - Consistent carbohydrate diet   - Sliding scale insulin as needed  - Diabetes education  - A1c 8.0 in May 2022 - Insulin dosing modified again due to elevated POCT glucose on 7/8  - 14 U Lantus for basal insulin and 4 U Admelog pre-meal x3  - 8 U NPH given this afternoon for pre-lunch fingerstick of >300  - Consistent carbohydrate diet + Glucerna per patient request   - Sliding scale insulin as needed  - Diabetes education  - A1c 8.0 in May 2022

## 2022-07-08 NOTE — PROGRESS NOTE ADULT - PROBLEM SELECTOR PLAN 5
- Chronic condition  - Solumedrol decreased to 10 mg secondary to elevated serum glucose - Chronic condition  - Solumedrol decreased to 8mg on 7/8secondary to elevated serum glucose

## 2022-07-08 NOTE — PROGRESS NOTE ADULT - PROBLEM SELECTOR PLAN 11
- S/p resection with colostomy   - Monitor ostomy output  - Ostomy care  - Pt denies constipation and bloating  - Bowel regimen

## 2022-07-08 NOTE — PROVIDER CONTACT NOTE (CRITICAL VALUE NOTIFICATION) - ASSESSMENT
Pt A&Ox4, VSS. no c/o chest pain, no c/o sob, no c/o pain. Pt able to make needs known. No signs of bleeding.

## 2022-07-08 NOTE — PROGRESS NOTE ADULT - PROBLEM SELECTOR PLAN 2
- Hospital course complicated by medication-induced anaphylaxis 2/2 cefepime in setting of PCN allergy  - Previously intubated for hypoxia and airway protection on 7/2; extubated on 7/4  - Stabilized, breathing comfortably on room air (SpO2 96%)

## 2022-07-08 NOTE — PROGRESS NOTE ADULT - PROBLEM SELECTOR PLAN 7
- Sacral Stage 2 Pressure Injury measuring 2.5cm(l) x 1cm(w) red wound base w/ min serosang drainage without odor noted.  - Wound care recs

## 2022-07-09 LAB
ALBUMIN SERPL ELPH-MCNC: 2.9 G/DL — LOW (ref 3.3–5)
ALP SERPL-CCNC: 68 U/L — SIGNIFICANT CHANGE UP (ref 40–120)
ALT FLD-CCNC: 21 U/L — SIGNIFICANT CHANGE UP (ref 10–45)
ANION GAP SERPL CALC-SCNC: 9 MMOL/L — SIGNIFICANT CHANGE UP (ref 5–17)
APTT BLD: 93 SEC — HIGH (ref 27.5–35.5)
AST SERPL-CCNC: 16 U/L — SIGNIFICANT CHANGE UP (ref 10–40)
BILIRUB SERPL-MCNC: 0.1 MG/DL — LOW (ref 0.2–1.2)
BUN SERPL-MCNC: 13 MG/DL — SIGNIFICANT CHANGE UP (ref 7–23)
CALCIUM SERPL-MCNC: 8.8 MG/DL — SIGNIFICANT CHANGE UP (ref 8.4–10.5)
CHLORIDE SERPL-SCNC: 102 MMOL/L — SIGNIFICANT CHANGE UP (ref 96–108)
CO2 SERPL-SCNC: 29 MMOL/L — SIGNIFICANT CHANGE UP (ref 22–31)
CREAT SERPL-MCNC: 0.55 MG/DL — SIGNIFICANT CHANGE UP (ref 0.5–1.3)
EGFR: 97 ML/MIN/1.73M2 — SIGNIFICANT CHANGE UP
GLUCOSE BLDC GLUCOMTR-MCNC: 212 MG/DL — HIGH (ref 70–99)
GLUCOSE BLDC GLUCOMTR-MCNC: 236 MG/DL — HIGH (ref 70–99)
GLUCOSE BLDC GLUCOMTR-MCNC: 253 MG/DL — HIGH (ref 70–99)
GLUCOSE BLDC GLUCOMTR-MCNC: 379 MG/DL — HIGH (ref 70–99)
GLUCOSE SERPL-MCNC: 192 MG/DL — HIGH (ref 70–99)
HCT VFR BLD CALC: 31.6 % — LOW (ref 34.5–45)
HGB BLD-MCNC: 9.3 G/DL — LOW (ref 11.5–15.5)
MAGNESIUM SERPL-MCNC: 2 MG/DL — SIGNIFICANT CHANGE UP (ref 1.6–2.6)
MCHC RBC-ENTMCNC: 20.7 PG — LOW (ref 27–34)
MCHC RBC-ENTMCNC: 29.4 GM/DL — LOW (ref 32–36)
MCV RBC AUTO: 70.4 FL — LOW (ref 80–100)
NRBC # BLD: 0 /100 WBCS — SIGNIFICANT CHANGE UP (ref 0–0)
PHOSPHATE SERPL-MCNC: 3.3 MG/DL — SIGNIFICANT CHANGE UP (ref 2.5–4.5)
PLATELET # BLD AUTO: 367 K/UL — SIGNIFICANT CHANGE UP (ref 150–400)
POTASSIUM SERPL-MCNC: 3.7 MMOL/L — SIGNIFICANT CHANGE UP (ref 3.5–5.3)
POTASSIUM SERPL-SCNC: 3.7 MMOL/L — SIGNIFICANT CHANGE UP (ref 3.5–5.3)
PROT SERPL-MCNC: 5.6 G/DL — LOW (ref 6–8.3)
RBC # BLD: 4.49 M/UL — SIGNIFICANT CHANGE UP (ref 3.8–5.2)
RBC # FLD: 16.6 % — HIGH (ref 10.3–14.5)
SODIUM SERPL-SCNC: 140 MMOL/L — SIGNIFICANT CHANGE UP (ref 135–145)
WBC # BLD: 8.24 K/UL — SIGNIFICANT CHANGE UP (ref 3.8–10.5)
WBC # FLD AUTO: 8.24 K/UL — SIGNIFICANT CHANGE UP (ref 3.8–10.5)

## 2022-07-09 PROCEDURE — 99233 SBSQ HOSP IP/OBS HIGH 50: CPT | Mod: GC

## 2022-07-09 RX ORDER — OXYCODONE AND ACETAMINOPHEN 5; 325 MG/1; MG/1
1 TABLET ORAL EVERY 6 HOURS
Refills: 0 | Status: DISCONTINUED | OUTPATIENT
Start: 2022-07-09 | End: 2022-07-09

## 2022-07-09 RX ORDER — INSULIN LISPRO 100/ML
6 VIAL (ML) SUBCUTANEOUS
Refills: 0 | Status: DISCONTINUED | OUTPATIENT
Start: 2022-07-09 | End: 2022-07-10

## 2022-07-09 RX ORDER — OXYCODONE AND ACETAMINOPHEN 5; 325 MG/1; MG/1
2 TABLET ORAL EVERY 6 HOURS
Refills: 0 | Status: DISCONTINUED | OUTPATIENT
Start: 2022-07-09 | End: 2022-07-11

## 2022-07-09 RX ADMIN — OXYCODONE AND ACETAMINOPHEN 2 TABLET(S): 5; 325 TABLET ORAL at 03:10

## 2022-07-09 RX ADMIN — Medication 3 MILLILITER(S): at 06:29

## 2022-07-09 RX ADMIN — Medication 100 MILLIGRAM(S): at 13:11

## 2022-07-09 RX ADMIN — Medication 250 MILLIGRAM(S): at 21:43

## 2022-07-09 RX ADMIN — Medication 3 MILLILITER(S): at 17:19

## 2022-07-09 RX ADMIN — Medication 60 MILLIGRAM(S): at 17:18

## 2022-07-09 RX ADMIN — TIOTROPIUM BROMIDE 1 CAPSULE(S): 18 CAPSULE ORAL; RESPIRATORY (INHALATION) at 12:12

## 2022-07-09 RX ADMIN — SENNA PLUS 2 TABLET(S): 8.6 TABLET ORAL at 21:41

## 2022-07-09 RX ADMIN — ERTAPENEM SODIUM 120 MILLIGRAM(S): 1 INJECTION, POWDER, LYOPHILIZED, FOR SOLUTION INTRAMUSCULAR; INTRAVENOUS at 13:11

## 2022-07-09 RX ADMIN — CHLORHEXIDINE GLUCONATE 1 APPLICATION(S): 213 SOLUTION TOPICAL at 06:30

## 2022-07-09 RX ADMIN — OXYCODONE AND ACETAMINOPHEN 2 TABLET(S): 5; 325 TABLET ORAL at 04:10

## 2022-07-09 RX ADMIN — OXYCODONE AND ACETAMINOPHEN 2 TABLET(S): 5; 325 TABLET ORAL at 12:45

## 2022-07-09 RX ADMIN — MUPIROCIN 1 APPLICATION(S): 20 OINTMENT TOPICAL at 06:29

## 2022-07-09 RX ADMIN — Medication 6 UNIT(S): at 16:40

## 2022-07-09 RX ADMIN — OXYCODONE AND ACETAMINOPHEN 2 TABLET(S): 5; 325 TABLET ORAL at 22:41

## 2022-07-09 RX ADMIN — FAMOTIDINE 20 MILLIGRAM(S): 10 INJECTION INTRAVENOUS at 11:41

## 2022-07-09 RX ADMIN — Medication 60 MILLIGRAM(S): at 06:29

## 2022-07-09 RX ADMIN — Medication 10: at 16:39

## 2022-07-09 RX ADMIN — QUETIAPINE FUMARATE 25 MILLIGRAM(S): 200 TABLET, FILM COATED ORAL at 21:41

## 2022-07-09 RX ADMIN — Medication 250 MILLIGRAM(S): at 09:12

## 2022-07-09 RX ADMIN — Medication 6 UNIT(S): at 11:47

## 2022-07-09 RX ADMIN — Medication 100 MILLIGRAM(S): at 06:29

## 2022-07-09 RX ADMIN — Medication 4: at 07:37

## 2022-07-09 RX ADMIN — POLYETHYLENE GLYCOL 3350 17 GRAM(S): 17 POWDER, FOR SOLUTION ORAL at 12:16

## 2022-07-09 RX ADMIN — LISINOPRIL 5 MILLIGRAM(S): 2.5 TABLET ORAL at 06:29

## 2022-07-09 RX ADMIN — OXYCODONE AND ACETAMINOPHEN 2 TABLET(S): 5; 325 TABLET ORAL at 21:41

## 2022-07-09 RX ADMIN — Medication 8 MILLIGRAM(S): at 06:28

## 2022-07-09 RX ADMIN — Medication 3 MILLILITER(S): at 11:42

## 2022-07-09 RX ADMIN — Medication 4 UNIT(S): at 07:37

## 2022-07-09 RX ADMIN — Medication 100 MILLIGRAM(S): at 21:41

## 2022-07-09 RX ADMIN — OXYCODONE AND ACETAMINOPHEN 2 TABLET(S): 5; 325 TABLET ORAL at 12:12

## 2022-07-09 RX ADMIN — INSULIN GLARGINE 14 UNIT(S): 100 INJECTION, SOLUTION SUBCUTANEOUS at 21:44

## 2022-07-09 RX ADMIN — Medication 6: at 11:46

## 2022-07-09 RX ADMIN — ATORVASTATIN CALCIUM 20 MILLIGRAM(S): 80 TABLET, FILM COATED ORAL at 21:40

## 2022-07-09 RX ADMIN — HEPARIN SODIUM 1300 UNIT(S)/HR: 5000 INJECTION INTRAVENOUS; SUBCUTANEOUS at 07:33

## 2022-07-09 NOTE — PROGRESS NOTE ADULT - PROBLEM SELECTOR PLAN 6
- Pt with nonproductive cough since pre-admission, reports is consistent with her "usual asthma"   - No subjective difficulty breathing, satting well on RA  - Coarse lung sounds on expiration  - Duoneb 3 ml q6 and   - Benzonatate 100 mg TID for cough   - Spiriva inhaler   - Incentive spirometry

## 2022-07-09 NOTE — PROGRESS NOTE ADULT - PROBLEM SELECTOR PLAN 5
- Chronic condition  - Solumedrol decreased to 8mg on 7/8secondary to elevated serum glucose - Chronic condition  - Solumedrol decreased to 8mg on 7/8 secondary to elevated serum glucose

## 2022-07-09 NOTE — PROGRESS NOTE ADULT - ASSESSMENT
71 year old female with PMH DM, COPD (chronic asthma), history of Chronic Adrenal Insufficiency on Chronic prednisone history of colorectal cancer s/p resection (colostomy bag), Hx of CAD, Chronic A fib on Eliquis, and tracheomalacia and multiple intubations presenting with right foot wound concerning for OM s/p wound care. Hospital course complicated by medicine-induced anaphylaxis 2/2 cefepime and V-tach w/pulse after epi administration, and patient was intubated and sedated for hypoxia and airway protection. Patient has since been extubated and she is s/p right foot incision and drainage and wound debridement 7/6 with Dr. Valenzuela. This AM she is resting comfortably in bed, pleasant, and appropriate, with pain well controlled on Percocet x2 q6; OR cultures still pending however per podiatry recs is prepared for d/c pending PICC placement with 6w course of vanc and ertapenem required for tx of osteomyelitis.

## 2022-07-09 NOTE — PROGRESS NOTE ADULT - PROBLEM SELECTOR PLAN 3
- Hx of chronic atrial fibrillation on Eliquis   - Episode of Vtach w/pulse following epi treatment for medication-induced anaphylaxis, requiring lidocaine for    - Heparin drip

## 2022-07-09 NOTE — PROGRESS NOTE ADULT - PROBLEM SELECTOR PLAN 4
- Insulin dosing modified again due to elevated POCT glucose on 7/8  - 14 U Lantus for basal insulin and 4 U Admelog pre-meal x3  - 8 U NPH given this afternoon for pre-lunch fingerstick of >300  - Consistent carbohydrate diet + Glucerna per patient request   - Sliding scale insulin as needed  - Diabetes education  - A1c 8.0 in May 2022

## 2022-07-09 NOTE — PROGRESS NOTE ADULT - PROBLEM SELECTOR PLAN 12
- Bowel regimen: Continue home senna, miralax, and lactulose  - Home eliquis held due to podiatry procedure 7/6 and potential upcoming PICC placement  - Diet: Carb consistent  - PT/OT  - Pain management: Percocet PRN for severe pain, Tylenol PRN for moderate pain   - Blood draws through mediport
- Bowel regimen: Continue home senna, miralax, and lactulose  - Home eliquis held due to podiatry procedure 7/6 and potential upcoming PICC placement  - Diet: Carb consistent  - PT/OT  - Pain management: Percocet PRN for severe pain, Tylenol PRN for moderate pain   - Blood draws through mediport

## 2022-07-09 NOTE — PROGRESS NOTE ADULT - SUBJECTIVE AND OBJECTIVE BOX
Internal Medicine   Edna Bennett | PGY-1    OVERNIGHT EVENTS:      SUBJECTIVE:       MEDICATIONS  (STANDING):  albuterol/ipratropium for Nebulization 3 milliLiter(s) Nebulizer every 6 hours  atorvastatin 20 milliGRAM(s) Oral at bedtime  benzonatate 100 milliGRAM(s) Oral three times a day  chlorhexidine 2% Cloths 1 Application(s) Topical <User Schedule>  dextrose 5%. 1000 milliLiter(s) (100 mL/Hr) IV Continuous <Continuous>  dextrose 5%. 1000 milliLiter(s) (50 mL/Hr) IV Continuous <Continuous>  dextrose 50% Injectable 25 Gram(s) IV Push once  dextrose 50% Injectable 12.5 Gram(s) IV Push once  dextrose 50% Injectable 25 Gram(s) IV Push once  diltiazem    Tablet 60 milliGRAM(s) Oral two times a day  ertapenem  IVPB 1000 milliGRAM(s) IV Intermittent every 24 hours  famotidine Injectable 20 milliGRAM(s) IV Push daily  glucagon  Injectable 1 milliGRAM(s) IntraMuscular once  heparin  Infusion.  Unit(s)/Hr (11 mL/Hr) IV Continuous <Continuous>  insulin glargine Injectable (LANTUS) 14 Unit(s) SubCutaneous at bedtime  insulin lispro (ADMELOG) corrective regimen sliding scale   SubCutaneous three times a day before meals  insulin lispro (ADMELOG) corrective regimen sliding scale   SubCutaneous at bedtime  insulin lispro Injectable (ADMELOG) 4 Unit(s) SubCutaneous three times a day before meals  lisinopril 5 milliGRAM(s) Oral daily  melatonin 3 milliGRAM(s) Oral at bedtime  methylPREDNISolone sodium succinate Injectable 8 milliGRAM(s) IV Push daily  mupirocin 2% Nasal 1 Application(s) Both Nostrils two times a day  polyethylene glycol 3350 17 Gram(s) Oral daily  senna 2 Tablet(s) Oral at bedtime  tiotropium 18 MICROgram(s) Capsule 1 Capsule(s) Inhalation daily  vancomycin  IVPB 1000 milliGRAM(s) IV Intermittent every 12 hours    MEDICATIONS  (PRN):  acetaminophen     Tablet .. 650 milliGRAM(s) Oral every 6 hours PRN Temp greater or equal to 38C (100.4F)  dextrose Oral Gel 15 Gram(s) Oral once PRN Blood Glucose LESS THAN 70 milliGRAM(s)/deciliter  oxycodone    5 mG/acetaminophen 325 mG 2 Tablet(s) Oral every 6 hours PRN Severe Pain (7 - 10)  QUEtiapine 25 milliGRAM(s) Oral at bedtime PRN agitation        T(F): 98.1 (07-09-22 @ 04:12), Max: 98.6 (07-08-22 @ 08:29)  HR: 74 (07-09-22 @ 04:12) (74 - 96)  BP: 125/61 (07-09-22 @ 04:12) (125/61 - 181/77)  BP(mean): --  RR: 18 (07-09-22 @ 04:12) (18 - 18)  SpO2: 100% (07-09-22 @ 04:12) (95% - 100%)    PHYSICAL EXAM:     GENERAL: NAD, lying in bed comfortably.  HEAD:  Atraumatic, normocephalic.  EYES: EOMI, PERRLA, conjunctiva and sclera clear, no nystagmus noted.  ENT: Moist mucous membranes,   NECK: Supple. No JVD. Trachea midline.  CHEST/LUNG: CTAB. No rales, rhonchi, wheezing, or rubs. Unlabored respirations.  HEART: RRR, no M/R/G, normal S1/S2.  ABDOMEN: Soft, nontender, nondistended, no organomegaly. Normoactive bowel sounds.  EXTREMITIES:  2+ peripheral pulses b/l, brisk capillary refill. No clubbing, cyanosis, or edema.  MSK: No gross deformities noted.   NEURO:  AAOx3, no focal deficits.   SKIN: No rashes or lesions.  PSYCH: Normal mood, affect.    TELEMETRY:    LABS:                        10.3   9.98  )-----------( 382      ( 08 Jul 2022 06:36 )             34.1     07-08    139  |  101  |  10  ----------------------------<  232<H>  3.7   |  28  |  0.51    Ca    9.0      08 Jul 2022 06:34  Phos  2.3     07-08  Mg     1.9     07-08    TPro  6.1  /  Alb  3.1<L>  /  TBili  0.2  /  DBili  x   /  AST  13  /  ALT  22  /  AlkPhos  78  07-08        PT/INR - ( 08 Jul 2022 06:36 )   PT: 12.9 sec;   INR: 1.12 ratio         PTT - ( 08 Jul 2022 14:53 )  PTT:80.1 sec    Creatinine Trend: 0.51<--, 0.54<--, 0.50<--, 0.50<--, 0.45<--, 0.49<--  I&O's Summary    07 Jul 2022 07:01  -  08 Jul 2022 07:00  --------------------------------------------------------  IN: 1325 mL / OUT: 2100 mL / NET: -775 mL    08 Jul 2022 07:01  -  09 Jul 2022 05:41  --------------------------------------------------------  IN: 456 mL / OUT: 3020 mL / NET: -2564 mL      BNP    RADIOLOGY & ADDITIONAL STUDIES:             Internal Medicine   Edna Bennett | PGY-2    OVERNIGHT EVENTS: ERIKA      SUBJECTIVE: Patient was seen and examined at bedside this morning. Reports feeling tired. Pain is managed with percocet which she took at 3am. Denies any nausea/vomiting/diarrhea, shortness of breath, abdominal pain or chest pain/palpitations. Patient responding appropriately to questions and able to make needs known.       MEDICATIONS  (STANDING):  albuterol/ipratropium for Nebulization 3 milliLiter(s) Nebulizer every 6 hours  atorvastatin 20 milliGRAM(s) Oral at bedtime  benzonatate 100 milliGRAM(s) Oral three times a day  chlorhexidine 2% Cloths 1 Application(s) Topical <User Schedule>  dextrose 5%. 1000 milliLiter(s) (100 mL/Hr) IV Continuous <Continuous>  dextrose 5%. 1000 milliLiter(s) (50 mL/Hr) IV Continuous <Continuous>  dextrose 50% Injectable 25 Gram(s) IV Push once  dextrose 50% Injectable 12.5 Gram(s) IV Push once  dextrose 50% Injectable 25 Gram(s) IV Push once  diltiazem    Tablet 60 milliGRAM(s) Oral two times a day  ertapenem  IVPB 1000 milliGRAM(s) IV Intermittent every 24 hours  famotidine Injectable 20 milliGRAM(s) IV Push daily  glucagon  Injectable 1 milliGRAM(s) IntraMuscular once  heparin  Infusion.  Unit(s)/Hr (11 mL/Hr) IV Continuous <Continuous>  insulin glargine Injectable (LANTUS) 14 Unit(s) SubCutaneous at bedtime  insulin lispro (ADMELOG) corrective regimen sliding scale   SubCutaneous three times a day before meals  insulin lispro (ADMELOG) corrective regimen sliding scale   SubCutaneous at bedtime  insulin lispro Injectable (ADMELOG) 4 Unit(s) SubCutaneous three times a day before meals  lisinopril 5 milliGRAM(s) Oral daily  melatonin 3 milliGRAM(s) Oral at bedtime  methylPREDNISolone sodium succinate Injectable 8 milliGRAM(s) IV Push daily  mupirocin 2% Nasal 1 Application(s) Both Nostrils two times a day  polyethylene glycol 3350 17 Gram(s) Oral daily  senna 2 Tablet(s) Oral at bedtime  tiotropium 18 MICROgram(s) Capsule 1 Capsule(s) Inhalation daily  vancomycin  IVPB 1000 milliGRAM(s) IV Intermittent every 12 hours    MEDICATIONS  (PRN):  acetaminophen     Tablet .. 650 milliGRAM(s) Oral every 6 hours PRN Temp greater or equal to 38C (100.4F)  dextrose Oral Gel 15 Gram(s) Oral once PRN Blood Glucose LESS THAN 70 milliGRAM(s)/deciliter  oxycodone    5 mG/acetaminophen 325 mG 2 Tablet(s) Oral every 6 hours PRN Severe Pain (7 - 10)  QUEtiapine 25 milliGRAM(s) Oral at bedtime PRN agitation        T(F): 98.1 (07-09-22 @ 04:12), Max: 98.6 (07-08-22 @ 08:29)  HR: 74 (07-09-22 @ 04:12) (74 - 96)  BP: 125/61 (07-09-22 @ 04:12) (125/61 - 181/77)  BP(mean): --  RR: 18 (07-09-22 @ 04:12) (18 - 18)  SpO2: 100% (07-09-22 @ 04:12) (95% - 100%)    PHYSICAL EXAM:     GENERAL: NAD, lying in bed comfortably.  HEAD:  Atraumatic, normocephalic.  EYES: EOMI, PERRLA, conjunctiva and sclera clear, no nystagmus noted.  ENT: Moist mucous membranes,   NECK: Supple. No JVD. Trachea midline.  CHEST/LUNG: CTAB. No rales, rhonchi, wheezing, or rubs. Unlabored respirations.  HEART: RRR, no M/R/G, normal S1/S2.  ABDOMEN: Soft, nontender, nondistended, no organomegaly. Normoactive bowel sounds.  EXTREMITIES:  2+ peripheral pulses b/l, brisk capillary refill. No clubbing, cyanosis, or edema.  MSK: No gross deformities noted.   NEURO:  AAOx3, no focal deficits.   SKIN: No rashes or lesions.  PSYCH: Normal mood, affect.    TELEMETRY:    LABS:                        10.3   9.98  )-----------( 382      ( 08 Jul 2022 06:36 )             34.1     07-08    139  |  101  |  10  ----------------------------<  232<H>  3.7   |  28  |  0.51    Ca    9.0      08 Jul 2022 06:34  Phos  2.3     07-08  Mg     1.9     07-08    TPro  6.1  /  Alb  3.1<L>  /  TBili  0.2  /  DBili  x   /  AST  13  /  ALT  22  /  AlkPhos  78  07-08        PT/INR - ( 08 Jul 2022 06:36 )   PT: 12.9 sec;   INR: 1.12 ratio         PTT - ( 08 Jul 2022 14:53 )  PTT:80.1 sec    Creatinine Trend: 0.51<--, 0.54<--, 0.50<--, 0.50<--, 0.45<--, 0.49<--  I&O's Summary    07 Jul 2022 07:01  -  08 Jul 2022 07:00  --------------------------------------------------------  IN: 1325 mL / OUT: 2100 mL / NET: -775 mL    08 Jul 2022 07:01  -  09 Jul 2022 05:41  --------------------------------------------------------  IN: 456 mL / OUT: 3020 mL / NET: -2564 mL      BNP    RADIOLOGY & ADDITIONAL STUDIES:

## 2022-07-09 NOTE — PROGRESS NOTE ADULT - ATTENDING COMMENTS
71 year old female with PMH DM, COPD (chronic asthma), history of Chronic Adrenal Insufficiency on Chronic prednisone history of colorectal cancer s/p resection (colostomy bag), Hx of CAD, Chronic A fib on Eliquis, and tracheomalacia and multiple intubations presenting with right foot wound concerning for OM s/p wound care. Hospital course complicated by medicine-induced anaphylaxis 2/2 cefepime and V-tach w/pulse after epi administration, and patient was intubated and sedated for hypoxia and airway protection. Patient has since been extubated and she is s/p right foot incision and drainage and wound debridement 7/6 with Dr. Valenzuela.  S/p I&D and debridement of right foot osteomyelitis and abscess, on abx till 8/17, clarify with staff if can be given via port if not then needs a PICC line  Dispo is to [facility]

## 2022-07-09 NOTE — PROGRESS NOTE ADULT - PROBLEM SELECTOR PLAN 1
- S/p I&D and debridement of right foot osteomyelitis and abscess on 7/6 with Dr. Valenzuela and Podiatry team   - ID recommendations: 6 weeks IV Abx with vancomycin 1g q12 and ertapenem 1g qd, thus requiring PICC placement upon d/c, order placed. Start date for Abx is considered 7/6/22  -- Pt will also require weekly trough, CBC, and serum Cr while receiving IV Abx  - Vanc trough before every AM dose to clarify serum vanc levels, most recent vanc trough 16.1  - OR culture pending but likely the same as surgical swab (Proteus and MRSA)

## 2022-07-10 ENCOUNTER — TRANSCRIPTION ENCOUNTER (OUTPATIENT)
Age: 72
End: 2022-07-10

## 2022-07-10 LAB
ALBUMIN SERPL ELPH-MCNC: 3.2 G/DL — LOW (ref 3.3–5)
ALP SERPL-CCNC: 69 U/L — SIGNIFICANT CHANGE UP (ref 40–120)
ALT FLD-CCNC: 28 U/L — SIGNIFICANT CHANGE UP (ref 10–45)
ANION GAP SERPL CALC-SCNC: 12 MMOL/L — SIGNIFICANT CHANGE UP (ref 5–17)
APTT BLD: 129.5 SEC — CRITICAL HIGH (ref 27.5–35.5)
APTT BLD: 54.1 SEC — HIGH (ref 27.5–35.5)
AST SERPL-CCNC: 25 U/L — SIGNIFICANT CHANGE UP (ref 10–40)
BILIRUB SERPL-MCNC: 0.1 MG/DL — LOW (ref 0.2–1.2)
BUN SERPL-MCNC: 16 MG/DL — SIGNIFICANT CHANGE UP (ref 7–23)
CALCIUM SERPL-MCNC: 9.3 MG/DL — SIGNIFICANT CHANGE UP (ref 8.4–10.5)
CHLORIDE SERPL-SCNC: 103 MMOL/L — SIGNIFICANT CHANGE UP (ref 96–108)
CO2 SERPL-SCNC: 28 MMOL/L — SIGNIFICANT CHANGE UP (ref 22–31)
CREAT SERPL-MCNC: 0.56 MG/DL — SIGNIFICANT CHANGE UP (ref 0.5–1.3)
EGFR: 96 ML/MIN/1.73M2 — SIGNIFICANT CHANGE UP
GLUCOSE BLDC GLUCOMTR-MCNC: 201 MG/DL — HIGH (ref 70–99)
GLUCOSE BLDC GLUCOMTR-MCNC: 229 MG/DL — HIGH (ref 70–99)
GLUCOSE BLDC GLUCOMTR-MCNC: 286 MG/DL — HIGH (ref 70–99)
GLUCOSE BLDC GLUCOMTR-MCNC: 290 MG/DL — HIGH (ref 70–99)
GLUCOSE SERPL-MCNC: 136 MG/DL — HIGH (ref 70–99)
HCT VFR BLD CALC: 32.3 % — LOW (ref 34.5–45)
HGB BLD-MCNC: 9.5 G/DL — LOW (ref 11.5–15.5)
MAGNESIUM SERPL-MCNC: 1.9 MG/DL — SIGNIFICANT CHANGE UP (ref 1.6–2.6)
MCHC RBC-ENTMCNC: 20.6 PG — LOW (ref 27–34)
MCHC RBC-ENTMCNC: 29.4 GM/DL — LOW (ref 32–36)
MCV RBC AUTO: 70.1 FL — LOW (ref 80–100)
NRBC # BLD: 0 /100 WBCS — SIGNIFICANT CHANGE UP (ref 0–0)
PHOSPHATE SERPL-MCNC: 3.2 MG/DL — SIGNIFICANT CHANGE UP (ref 2.5–4.5)
PLATELET # BLD AUTO: 428 K/UL — HIGH (ref 150–400)
POTASSIUM SERPL-MCNC: 3.8 MMOL/L — SIGNIFICANT CHANGE UP (ref 3.5–5.3)
POTASSIUM SERPL-SCNC: 3.8 MMOL/L — SIGNIFICANT CHANGE UP (ref 3.5–5.3)
PROT SERPL-MCNC: 6.1 G/DL — SIGNIFICANT CHANGE UP (ref 6–8.3)
RBC # BLD: 4.61 M/UL — SIGNIFICANT CHANGE UP (ref 3.8–5.2)
RBC # FLD: 17 % — HIGH (ref 10.3–14.5)
SARS-COV-2 RNA SPEC QL NAA+PROBE: SIGNIFICANT CHANGE UP
SODIUM SERPL-SCNC: 143 MMOL/L — SIGNIFICANT CHANGE UP (ref 135–145)
VANCOMYCIN TROUGH SERPL-MCNC: 13.7 UG/ML — SIGNIFICANT CHANGE UP (ref 10–20)
WBC # BLD: 13.14 K/UL — HIGH (ref 3.8–10.5)
WBC # FLD AUTO: 13.14 K/UL — HIGH (ref 3.8–10.5)

## 2022-07-10 PROCEDURE — 99233 SBSQ HOSP IP/OBS HIGH 50: CPT | Mod: GC

## 2022-07-10 RX ORDER — INSULIN LISPRO 100/ML
9 VIAL (ML) SUBCUTANEOUS
Refills: 0 | Status: DISCONTINUED | OUTPATIENT
Start: 2022-07-10 | End: 2022-07-11

## 2022-07-10 RX ORDER — INSULIN GLARGINE 100 [IU]/ML
15 INJECTION, SOLUTION SUBCUTANEOUS
Qty: 0 | Refills: 0 | DISCHARGE
Start: 2022-07-10

## 2022-07-10 RX ORDER — INSULIN GLARGINE 100 [IU]/ML
14 INJECTION, SOLUTION SUBCUTANEOUS
Qty: 0 | Refills: 0 | DISCHARGE
Start: 2022-07-10

## 2022-07-10 RX ORDER — INSULIN LISPRO 100/ML
VIAL (ML) SUBCUTANEOUS
Refills: 0 | Status: CANCELLED | OUTPATIENT
Start: 2022-07-10 | End: 2022-07-11

## 2022-07-10 RX ORDER — INSULIN LISPRO 100/ML
VIAL (ML) SUBCUTANEOUS
Refills: 0 | Status: DISCONTINUED | OUTPATIENT
Start: 2022-07-10 | End: 2022-07-11

## 2022-07-10 RX ORDER — INSULIN GLARGINE 100 [IU]/ML
15 INJECTION, SOLUTION SUBCUTANEOUS AT BEDTIME
Refills: 0 | Status: CANCELLED | OUTPATIENT
Start: 2022-07-10 | End: 2022-07-11

## 2022-07-10 RX ORDER — INSULIN LISPRO 100/ML
VIAL (ML) SUBCUTANEOUS AT BEDTIME
Refills: 0 | Status: DISCONTINUED | OUTPATIENT
Start: 2022-07-10 | End: 2022-07-11

## 2022-07-10 RX ORDER — INSULIN LISPRO 100/ML
9 VIAL (ML) SUBCUTANEOUS
Refills: 0 | Status: CANCELLED | OUTPATIENT
Start: 2022-07-10 | End: 2022-07-11

## 2022-07-10 RX ORDER — INSULIN GLARGINE 100 [IU]/ML
15 INJECTION, SOLUTION SUBCUTANEOUS AT BEDTIME
Refills: 0 | Status: DISCONTINUED | OUTPATIENT
Start: 2022-07-10 | End: 2022-07-10

## 2022-07-10 RX ORDER — INSULIN LISPRO 100/ML
VIAL (ML) SUBCUTANEOUS AT BEDTIME
Refills: 0 | Status: CANCELLED | OUTPATIENT
Start: 2022-07-10 | End: 2022-07-11

## 2022-07-10 RX ORDER — INSULIN LISPRO 100/ML
9 VIAL (ML) SUBCUTANEOUS
Refills: 0 | Status: DISCONTINUED | OUTPATIENT
Start: 2022-07-10 | End: 2022-07-10

## 2022-07-10 RX ORDER — INSULIN GLARGINE 100 [IU]/ML
15 INJECTION, SOLUTION SUBCUTANEOUS AT BEDTIME
Refills: 0 | Status: DISCONTINUED | OUTPATIENT
Start: 2022-07-10 | End: 2022-07-11

## 2022-07-10 RX ORDER — APIXABAN 2.5 MG/1
5 TABLET, FILM COATED ORAL EVERY 12 HOURS
Refills: 0 | Status: DISCONTINUED | OUTPATIENT
Start: 2022-07-10 | End: 2022-07-11

## 2022-07-10 RX ADMIN — Medication 250 MILLIGRAM(S): at 08:17

## 2022-07-10 RX ADMIN — Medication 100 MILLIGRAM(S): at 05:08

## 2022-07-10 RX ADMIN — SENNA PLUS 2 TABLET(S): 8.6 TABLET ORAL at 22:09

## 2022-07-10 RX ADMIN — Medication 8 MILLIGRAM(S): at 05:07

## 2022-07-10 RX ADMIN — Medication 6 UNIT(S): at 07:42

## 2022-07-10 RX ADMIN — ERTAPENEM SODIUM 120 MILLIGRAM(S): 1 INJECTION, POWDER, LYOPHILIZED, FOR SOLUTION INTRAMUSCULAR; INTRAVENOUS at 14:21

## 2022-07-10 RX ADMIN — ATORVASTATIN CALCIUM 20 MILLIGRAM(S): 80 TABLET, FILM COATED ORAL at 22:08

## 2022-07-10 RX ADMIN — Medication 9 UNIT(S): at 16:37

## 2022-07-10 RX ADMIN — POLYETHYLENE GLYCOL 3350 17 GRAM(S): 17 POWDER, FOR SOLUTION ORAL at 11:42

## 2022-07-10 RX ADMIN — HEPARIN SODIUM 1300 UNIT(S)/HR: 5000 INJECTION INTRAVENOUS; SUBCUTANEOUS at 07:35

## 2022-07-10 RX ADMIN — FAMOTIDINE 20 MILLIGRAM(S): 10 INJECTION INTRAVENOUS at 12:45

## 2022-07-10 RX ADMIN — INSULIN GLARGINE 15 UNIT(S): 100 INJECTION, SOLUTION SUBCUTANEOUS at 22:17

## 2022-07-10 RX ADMIN — Medication 3 MILLILITER(S): at 18:19

## 2022-07-10 RX ADMIN — OXYCODONE AND ACETAMINOPHEN 2 TABLET(S): 5; 325 TABLET ORAL at 06:20

## 2022-07-10 RX ADMIN — Medication 6: at 11:40

## 2022-07-10 RX ADMIN — Medication 60 MILLIGRAM(S): at 05:08

## 2022-07-10 RX ADMIN — Medication 250 MILLIGRAM(S): at 21:57

## 2022-07-10 RX ADMIN — OXYCODONE AND ACETAMINOPHEN 2 TABLET(S): 5; 325 TABLET ORAL at 05:19

## 2022-07-10 RX ADMIN — Medication 4: at 07:42

## 2022-07-10 RX ADMIN — OXYCODONE AND ACETAMINOPHEN 2 TABLET(S): 5; 325 TABLET ORAL at 18:22

## 2022-07-10 RX ADMIN — CHLORHEXIDINE GLUCONATE 1 APPLICATION(S): 213 SOLUTION TOPICAL at 05:06

## 2022-07-10 RX ADMIN — Medication 3 MILLILITER(S): at 11:42

## 2022-07-10 RX ADMIN — Medication 60 MILLIGRAM(S): at 16:59

## 2022-07-10 RX ADMIN — Medication 3 MILLILITER(S): at 05:08

## 2022-07-10 RX ADMIN — OXYCODONE AND ACETAMINOPHEN 2 TABLET(S): 5; 325 TABLET ORAL at 12:11

## 2022-07-10 RX ADMIN — Medication 100 MILLIGRAM(S): at 14:21

## 2022-07-10 RX ADMIN — Medication 100 MILLIGRAM(S): at 22:08

## 2022-07-10 RX ADMIN — APIXABAN 5 MILLIGRAM(S): 2.5 TABLET, FILM COATED ORAL at 15:29

## 2022-07-10 RX ADMIN — Medication 4: at 16:38

## 2022-07-10 RX ADMIN — TIOTROPIUM BROMIDE 1 CAPSULE(S): 18 CAPSULE ORAL; RESPIRATORY (INHALATION) at 11:43

## 2022-07-10 RX ADMIN — OXYCODONE AND ACETAMINOPHEN 2 TABLET(S): 5; 325 TABLET ORAL at 11:41

## 2022-07-10 RX ADMIN — Medication 3 MILLIGRAM(S): at 22:09

## 2022-07-10 RX ADMIN — Medication 6 UNIT(S): at 11:40

## 2022-07-10 RX ADMIN — LISINOPRIL 5 MILLIGRAM(S): 2.5 TABLET ORAL at 05:08

## 2022-07-10 RX ADMIN — HEPARIN SODIUM 1400 UNIT(S)/HR: 5000 INJECTION INTRAVENOUS; SUBCUTANEOUS at 08:19

## 2022-07-10 RX ADMIN — Medication 2: at 22:17

## 2022-07-10 NOTE — DISCHARGE NOTE NURSING/CASE MANAGEMENT/SOCIAL WORK - PATIENT PORTAL LINK FT
You can access the FollowMyHealth Patient Portal offered by [facility] by registering at the following website: [email or web address]. By joining [employer]’s FollowMyHealth portal, you will also be able to view your health information using other applications (apps) compatible with our system.

## 2022-07-10 NOTE — DISCHARGE NOTE NURSING/CASE MANAGEMENT/SOCIAL WORK - NSDCVIVACCINE_GEN_ALL_CORE_FT
COVID-19, mRNA, LNP-S, PF, 100 mcg/ 0.5 mL dose (Moderna); 06-Dec-2021 17:12; Afshan Lew (RADHA); [employer] [id number] (Exp. Date: 22-Dec-2021); IntraMuscular; Deltoid Left.; 0.25 milliLiter(s);

## 2022-07-10 NOTE — PROGRESS NOTE ADULT - PROBLEM SELECTOR PLAN 7
- Sacral Stage 2 Pressure Injury measuring 2.5cm(l) x 1cm(w) red wound base w/ min serosang drainage without odor noted.  - Wound care recs prior to d/c - Sacral Stage 2 Pressure Injury measuring 2.5cm(l) x 1cm(w) red wound base w/ min serosang drainage without odor noted.  - Wound care recs

## 2022-07-10 NOTE — PATIENT PROFILE ADULT - NSPROHMDIABETMGMTSTRAT_GEN_A_NUR
Initiate Treatment: triamcinolone acetonide 0.1 % topical cream BID\\nApply to  AA on Affected areas on lt arm, hip, bid x2-3 weeks prn blood glucose testing Detail Level: Detailed Plan: Bacterial culture negative. Called pt following her appointment and left message notifying her of negative culture result. Discussed punch bx if recurs.

## 2022-07-10 NOTE — PROGRESS NOTE ADULT - PROBLEM SELECTOR PLAN 1
- S/p I&D and debridement of right foot osteomyelitis and abscess, right navicular bone resection on 7/6 with Dr. Valenzuela and Podiatry team   - ID recommendations: 6 weeks IV Abx with vancomycin 1g q12 and ertapenem 1g qd,  - Per nurse manager, pt can receive IV Abx through [facility] thus no longer requiring PICC placement.  Will reach out to ID for approval   - Start date for Abx is considered 7/6/22  -- Pt will also require weekly trough, CBC, and serum Cr while receiving IV Abx  - Vanc trough before every AM dose to clarify serum vanc levels, most recent vanc trough 16.1  - OR culture final: Proteus ESBL and MRSA) - S/p I&D and debridement of right foot osteomyelitis and abscess, right navicular bone resection on 7/6 with Dr. Valenzuela and Podiatry team   - ID recommendations: 6 weeks IV Abx with vancomycin 1g q12 and ertapenem 1g qd  - Per nurse manager, pt can receive IV Abx through [facility] thus no longer requiring PICC placement.  Will reach out to ID for approval   - Start date for Abx is considered 7/6/22  -- Pt will also require weekly trough, CBC, and serum Cr while receiving IV Abx  - Vanc trough Vanc trough before every AM dose to clarify serum vanc levels, most recent vanc trough 13.7 7/9  - OR culture final: Proteus ESBL and MRSA - S/p I&D and debridement of right foot osteomyelitis and abscess, right navicular bone resection on 7/6 with Dr. Valenzuela and Podiatry team   - ID recommendations: 6 weeks IV Abx with vancomycin 1g q12 and ertapenem 1g qd  - Per ID and nurse manager, pt can receive IV Abx through [facility] thus no longer requiring PICC placement.    - Start date for Abx is considered 7/6/22  -- Pt will also require weekly trough, CBC, and serum Cr while receiving IV Abx  - Vanc trough Vanc trough before every AM dose to clarify serum vanc levels, most recent vanc trough 13.7 7/9  - OR culture final: Proteus ESBL and MRSA

## 2022-07-10 NOTE — PROGRESS NOTE ADULT - PROBLEM SELECTOR PLAN 6
- Pt with nonproductive cough since pre-admission, reports is consistent with her "usual asthma"   - No subjective difficulty breathing, satting well on RA  - Coarse lung sounds on expiration  - Duoneb 3 ml q6 and   - Benzonatate 100 mg TID for cough   - Incentive spirometry - Pt with nonproductive cough since pre-admission, reports is consistent with her "usual asthma", improving   - No subjective difficulty breathing, satting well on RA  - Duoneb 3 ml q6  - Benzonatate 100 mg TID for cough   - Incentive spirometry

## 2022-07-10 NOTE — PROVIDER CONTACT NOTE (OTHER) - ASSESSMENT
Patient AOx4. On heparin drip. No signs of bleeding noted.
Patient AOx4. On heparin drip. No signs of bleeding noted.
Patient sitting upright in bed, asymptomatic, VSS, back in sinus rhythm 80s. On Full AC and diltiazem 60mg BID

## 2022-07-10 NOTE — DISCHARGE NOTE NURSING/CASE MANAGEMENT/SOCIAL WORK - NSDCFUADDAPPT_GEN_ALL_CORE_FT
Podiatry Discharge Instructions:  Follow up: Please follow up with Dr. Love / Dr. Slaughter within 1 week of discharge from the hospital, please call [facility] at [phone] and discuss that you recently were seen in the hospital.  Wound Care: Please leave your dressing clean dry intact until your follow up appointment.    Weight bearing: Please weight bear as toe tough only until further evaluated in the follow-up appointment   Antibiotics: Please continue as instructed.

## 2022-07-10 NOTE — PATIENT PROFILE ADULT - NSPROPASSIVESMOKEEXPOSURE_GEN_A_NUR
No -+orthopnea, crackles, b/l pitting edema, b/l pleural effusions with elevated Pro BNP, grossly fluid overloaded with high suspicion for heart failure  -Obtain TTE  -will give 1 dose of IV lasix 40mg IV for today and monitor creatinine, consider additional dosing tomorrow  -Troponin negative x1 but no chest pain, will send 2nd level  -Observe on telemetry  -Strict I's and O's  -Daily weights -+orthopnea, crackles, b/l pitting edema, b/l pleural effusions with elevated Pro BNP, grossly fluid overloaded with high suspicion for heart failure  -f/u TTE  -will give 1 dose of IV lasix 40mg IV for today and monitor creatinine, consider additional dosing tomorrow  -Troponin negative x 3  -Observe on telemetry  -Strict I's and O's  -Daily weights

## 2022-07-10 NOTE — PROGRESS NOTE ADULT - PROBLEM SELECTOR PLAN 3
- Hx of chronic atrial fibrillation on Eliquis   - Episode of Vtach w/pulse following epi treatment for medication-induced anaphylaxis, requiring lidocaine for    - Heparin drip - Hx of chronic atrial fibrillation on Eliquis   - Episode of Vtach w/pulse following epi treatment for medication-induced anaphylaxis, requiring lidocaine for    - Heparin drip d/c today to restart home Eliquis 5mg BID

## 2022-07-10 NOTE — DISCHARGE NOTE NURSING/CASE MANAGEMENT/SOCIAL WORK - NSDCPEFALRISK_GEN_ALL_CORE
For information on Fall & Injury Prevention, visit: [email or web address] OR  [email or web address] OR  https://www.cdc.gov/steadi/patient.html

## 2022-07-10 NOTE — PROVIDER CONTACT NOTE (OTHER) - SITUATION
Patient with episode of PAF on tele HR up to 130 x 5.8 seconds
Patient noted with APTT result of 26. On heparin drip
Patient noted with APTT result of 34.7.

## 2022-07-10 NOTE — PATIENT PROFILE ADULT - FUNCTIONAL ASSESSMENT - BASIC MOBILITY 6.
2-calculated by average/Not able to assess (calculate score using [facility] averaging method)

## 2022-07-10 NOTE — PROVIDER CONTACT NOTE (OTHER) - BACKGROUND
71 year old female, got admitted for anaphylactic shock.
71 year old female, admitted for Anaphylactic shock
71 year old female with PMH of Diabetes, A. fib, TIA, DVT, colorectal CA s/p ressection w/ colostomy, adrenal insufficiency. admitted 7/2 for anaphylactic shock & OM of right foot.

## 2022-07-10 NOTE — PROGRESS NOTE ADULT - PROBLEM SELECTOR PLAN 11
- Bowel regimen: Continue home senna, miralax, and lactulose  - Home eliquis held due to podiatry procedure 7/6 and potential upcoming PICC placement  - Diet: Carb consistent  - PT/OT  - Dispo: [facility] vs  [facility]. In progress w/ CM  - Pain management: Percocet PRN for severe pain, Tylenol PRN for moderate pain   - Blood draws through [facility] - Bowel regimen: Continue home senna, miralax, and lactulose  - Home eliquis held due to podiatry procedure 7/6 and potential upcoming PICC placement  - Diet: Carb consistent  - PT/OT  - [facility]: [facility]. In progress w/ CM  - Pain management: Percocet PRN for severe pain, Tylenol PRN for moderate pain; will titrate pain regimen 2 1 Percocet q6 + Tylenol in prep for d/c  - [facility] - Bowel regimen: Continue home senna, miralax, and lactulose  - Home eliquis 5mg BID restarted 7/10 as pt has no pending procedures   - Diet: Carb consistent  - PT/OT  - Dispo: [facility]  [facility]. In progress w/ CM  - Pain management: Percocet PRN for severe pain, Tylenol PRN for moderate pain; will titrate pain regimen 2 1 Percocet q6 + Tylenol in prep for d/c  - Blood draws through [facility]

## 2022-07-10 NOTE — PROVIDER CONTACT NOTE (OTHER) - ACTION/TREATMENT ORDERED:
Crystal Dutta made aware (team 4). heparin drip increase from 12 to 15ml/hr per nomogram and agreed.
Provider notified, continue to monitor.
Crystal Dutta (Team 4) made aware of increasing heparin drip from 9 to 12ml/hr as per nomogram and agreed.

## 2022-07-10 NOTE — PROGRESS NOTE ADULT - PROBLEM SELECTOR PLAN 4
- Insulin dosing modified again due to elevated POCT glucose on 7/8  - 14 U Lantus for basal insulin and 4 U Admelog pre-meal x3  - 8 U NPH given this afternoon for pre-lunch fingerstick of >300  - Consistent carbohydrate diet + Glucerna per patient request   - Sliding scale insulin as needed  - Diabetes education  - A1c 8.0 in May 2022 - Insulin dosing modified again due to elevated POCT glucose on 7/8  - 14 U Lantus for basal insulin and 6 U Admelog pre-meal x3  - 8 U NPH given 7/8n for pre-lunch fingerstick of >300  - Consistent carbohydrate diet + Glucerna per patient request   - Sliding scale insulin as needed  - Diabetes education  - A1c 8.0 in May 2022 - Insulin dosing modified again due to elevated POCT glucose on 7/9  - 15 U Lantus for basal insulin and 9 U Admelog pre-meal x3  - 8 U NPH given 7/8n for pre-lunch fingerstick of >300  - Consistent carbohydrate diet + Glucerna per patient request   - Sliding scale insulin as needed  - Diabetes education  - A1c 8.0 in May 2022

## 2022-07-10 NOTE — PROGRESS NOTE ADULT - ATTENDING COMMENTS
71 year old female with PMH DM, COPD (chronic asthma), history of Chronic Adrenal Insufficiency on Chronic prednisone history of colorectal cancer s/p resection (colostomy bag), Hx of CAD, Chronic A fib on Eliquis, and tracheomalacia and multiple intubations presenting with right foot wound concerning for OM s/p wound care. Hospital course complicated by medicine-induced anaphylaxis 2/2 cefepime and V-tach w/pulse after epi administration, and patient was intubated and sedated for hypoxia and airway protection. Patient has since been extubated and she is s/p right foot incision and drainage and wound debridement 7/6 with Dr. Valenzuela.  -S/p I&D and debridement of right foot osteomyelitis and abscess, on abx till 8/17, abx can be given via port clarified by nursing staff   -[facility]po is to [facility] 71 year old female with PMH DM, COPD (chronic asthma), history of Chronic Adrenal Insufficiency on Chronic prednisone history of colorectal cancer s/p resection (colostomy bag), Hx of CAD, Chronic A fib on Eliquis, and tracheomalacia and multiple intubations presenting with right foot wound concerning for OM s/p wound care. Hospital course complicated by medicine-induced anaphylaxis 2/2 cefepime and V-tach w/pulse after epi administration, and patient was intubated and sedated for hypoxia and airway protection. Patient has since been extubated and she is s/p right foot incision and drainage and wound debridement 7/6 with Dr. Valenzuela.  -S/p I&D and debridement of right foot osteomyelitis and abscess, on abx till 8/17, abx can be given via port clarified by nursing staff   -increase lantus 15uqhs, admelog 9utid  -Dispo is to [facility]

## 2022-07-10 NOTE — PROGRESS NOTE ADULT - SUBJECTIVE AND OBJECTIVE BOX
71 year old female with PMH DM, COPD (chronic asthma), history of Chronic Adrenal Insufficiency on Chronic prednisone history of colorectal cancer s/p resection (colostomy bag), Hx of CAD, Chronic A fib on Eliquis, bronchiectasis, and tracheomalacia and multiple intubations presenting with right foot wound concerning for OM s/p wound care. Course complicated by medication induced anaphylaxis 2/2 cefepime in setting of penicillin allergy. Further complicated by V-tach with pulse after epi administration, requiring lidocaine for . Patient was intubated and sedated for hypoxia and airway protection. Pt was successfully extubated, with improving mental status on nasal cannula. MRI showed osteomyelitis and developing abscess in R foot, with wound cultures showing MRSA and proteus mirabilis. Patient was continued on meropenem and vancomycin. Podiatry is following this patient, she is s/p right foot incision and drainage and resection of navicular bone  with Dr. Valenzuela. She was transferred to medicine floor on .     incomplete*****  No ON events.  Pt reports that her R foot pain has subsided since her pain medication changes. . She denies fever, chills, n/v/d, constipation CP, and shortness of breath. Right foot dressing is clean and dry with no drainage. Pt and daughter interested in [facility] for [facility] per conversation with CM.  REVIEW OF SYSTEMS:    CONSTITUTIONAL: No weakness, fevers or chills  EYES/ENT: No visual changes;  No vertigo or throat pain   NECK: No pain or stiffness  RESPIRATORY: + Nonproductive cough. No wheezing, hemoptysis, no shortness of breath  CARDIOVASCULAR: No chest pain or palpitations  GASTROINTESTINAL: No abdominal or epigastric pain. No nausea, vomiting, or hematemesis; No diarrhea or constipation. No melena or hematochezia.  GENITOURINARY: No dysuria, frequency or hematuria  NEUROLOGICAL: No numbness or weakness  SKIN: No itching, rashes    MEDICATIONS  (STANDING):  albuterol/ipratropium for Nebulization 3 milliLiter(s) Nebulizer every 6 hours  atorvastatin 20 milliGRAM(s) Oral at bedtime  benzonatate 100 milliGRAM(s) Oral three times a day  chlorhexidine 2% Cloths 1 Application(s) Topical <User Schedule>  dextrose 5%. 1000 milliLiter(s) (100 mL/Hr) IV Continuous <Continuous>  dextrose 5%. 1000 milliLiter(s) (50 mL/Hr) IV Continuous <Continuous>  dextrose 50% Injectable 25 Gram(s) IV Push once  dextrose 50% Injectable 12.5 Gram(s) IV Push once  dextrose 50% Injectable 25 Gram(s) IV Push once  diltiazem    Tablet 60 milliGRAM(s) Oral two times a day  ertapenem  IVPB 1000 milliGRAM(s) IV Intermittent every 24 hours  famotidine Injectable 20 milliGRAM(s) IV Push daily  glucagon  Injectable 1 milliGRAM(s) IntraMuscular once  heparin  Infusion.  Unit(s)/Hr (11 mL/Hr) IV Continuous <Continuous>  insulin glargine Injectable (LANTUS) 14 Unit(s) SubCutaneous at bedtime  insulin lispro (ADMELOG) corrective regimen sliding scale   SubCutaneous three times a day before meals  insulin lispro (ADMELOG) corrective regimen sliding scale   SubCutaneous at bedtime  insulin lispro Injectable (ADMELOG) 6 Unit(s) SubCutaneous three times a day before meals  lisinopril 5 milliGRAM(s) Oral daily  melatonin 3 milliGRAM(s) Oral at bedtime  methylPREDNISolone sodium succinate Injectable 8 milliGRAM(s) IV Push daily  polyethylene glycol 3350 17 Gram(s) Oral daily  senna 2 Tablet(s) Oral at bedtime  tiotropium 18 MICROgram(s) Capsule 1 Capsule(s) Inhalation daily  vancomycin  IVPB 1000 milliGRAM(s) IV Intermittent every 12 hours    MEDICATIONS  (PRN):  acetaminophen     Tablet .. 650 milliGRAM(s) Oral every 6 hours PRN Temp greater or equal to 38C (100.4F)  dextrose Oral Gel 15 Gram(s) Oral once PRN Blood Glucose LESS THAN 70 milliGRAM(s)/deciliter  oxycodone    5 mG/acetaminophen 325 mG 2 Tablet(s) Oral every 6 hours PRN Severe Pain (7 - 10)  QUEtiapine 25 milliGRAM(s) Oral at bedtime PRN agitation    Vital Signs Last 24 Hrs  T(C): 36.8 (10 Jul 2022 04:20), Max: 36.9 (2022 16:00)  T(F): 98.3 (10 Jul 2022 04:20), Max: 98.4 (2022 16:00)  HR: 77 (10 Jul 2022 04:20) (77 - 89)  BP: 128/70 (10 Jul 2022 04:20) (118/68 - 134/56)  BP(mean): --  RR: 18 (10 Jul 2022 04:20) (18 - 18)  SpO2: 94% (10 Jul 2022 04:20) (94% - 100%)    Parameters below as of 10 Jul 2022 04:20  Patient On (Oxygen Delivery Method): room air    GENERAL: NAD, lying in bed comfortably  HEAD:  Atraumatic, normocephalic  EYES: EOMI, PERRLA, conjunctiva and sclera clear. Mild exophthalmos bilaterally   ENT: Moist mucous membranes  NECK: Supple, no JVD  HEART: Regular rate and rhythm, no murmurs, rubs, or gallops  LUNGS: Unlabored respirations.  Coarse lung sounds on expiration. No wheezing.   ABDOMEN: Soft, nontender, nondistended, +BS, ostomy site clean with no sign of infection   EXTREMITIES: Right foot wrapped in elastic bandage with no apparent bleeding or drainage. 2+ peripheral pulses bilaterally. No clubbing, cyanosis, or edema  NERVOUS SYSTEM:  A&Ox3, no focal deficits   SKIN: No rashes or lesions                                        9.5    13.14 )-----------( 428      ( 10 Jul 2022 07:29 )             32.3     07-09    140  |  102  |  13  ----------------------------<  192<H>  3.7   |  29  |  0.55    Ca    8.8      2022 06:40  Phos  3.3     07-09  Mg     2.0     07-09    TPro  5.6<L>  /  Alb  2.9<L>  /  TBili  0.1<L>  /  DBili  x   /  AST  16  /  ALT  21  /  AlkPhos  68  07-09    PTT - ( 10 Jul 2022 07:30 )  PTT:54.1 sec    CAPILLARY BLOOD GLUCOSE    POCT Blood Glucose.: 201 mg/dL (10 Jul 2022 07:37)  POCT Blood Glucose.: 236 mg/dL (2022 21:01)  POCT Blood Glucose.: 379 mg/dL (2022 16:10)  POCT Blood Glucose.: 253 mg/dL (2022 11:44)    MICROBIOLOGY:    Vancomycin Level, Trough: 13.7 ug/mL (22)    .Tissue Culture   22:   Rare Proteus mirabilis ESBL --    Few polymorphonuclear leukocytes seen per low power field  No organisms seen per oil power field    ET Tube ET Tube  22   Moderate Proteus mirabilis ESBL  Normal Respiratory Jessica present  --  Proteus mirabilis ESBL    .Blood Blood-Peripheral  22   No Growth Final     .Abscess R foot wound  22   Numerous Proteus mirabilis ESBL  Numerous Methicillin Resistant Staphylococcus aureus  Moderate Corynebacterium species d"  --  Proteus mirabilis ESBL  Methicillin resistant Staphylococcus aureus    Clean Catch Clean Catch (Midstream)  22   <10,000 CFU/mL Normal Urogenital Jessica  --  --    .Blood Blood-Peripheral  22   No Growth Final  --  --    RADIOLOGY & ADDITIONAL TESTS:     < from: MR Foot w/wo IV Cont, Right (22 @ 23:43) >    INTERPRETATION:  MR HINDFOOT/ANKLE WITHOUT AND WITH IV CONTRAST RIGHT    HISTORY:  Diabetic foot swelling. Concern for osteomyelitis. Wound.    TECHNIQUE:  Multiplanar MRI of the right hindfoot/ankle was performed   without and with 5.5cc cc administered Gadavist intravenous gadolinium   contrast using 10 sequences.    COMPARISON:  Right foot x-rays dated 2022 and MRI dated 2022    FINDINGS:    OSSEOUS STRUCTURES    Acute Fractures/Contusions:  None.    Chronic Fractures/Ossifications:  None.    Marrow:  Soft tissue wound is present along the medial margin of the   navicular with underlying marrow edema with mild T1 marrow replacement   and enhancement of the medial margin of the navicular concerning for   osteomyelitis.    Tarsal Coalition:  None.    LIGAMENTS    Talofibular/Calcaneofibular Ligaments:  Intact.    Tibiofibular/Syndesmotic Ligaments:  Intact.    Medial Deltoid Ligament Complex:  Intact.    Spring/Subtalar Ligaments:  Intact.    TENDONS    Posterior Tibialis/Medial Flexor Tendons:  Intact.    Peroneal Tendons:  Intact.    Extensor Tendons:  Intact.    ACHILLES TENDON  Intact.    PLANTAR FASCIA  Intact.    JOINTS    Tibiotalar Joint:  Preserved.    Subtalar Joints:  Preserved.    Intertarsal Joints:  Preserved.    Tarsometatarsal Joints:  Preserved.    SOFT TISSUES    Masses/Cysts:  None.    Musculature:  Intact.    Subcutaneous Tissues:  Small ill-defined fluid collection is present   within the plantar heel fat-pad suggesting an adventitial bursa versus   developing abscess measuring up to 1 cm in size near the calcaneal   tuberosity and plantar fascia. There is mild surrounding enhancement.   Mild subcutaneous edema is present.    NEUROVASCULAR STRUCTURES    Tarsal Tunnel:  Preserved.    IMPRESSION:  1. Soft tissue wound along the medial margin of the navicular with   changes concerning for underlying osteomyelitis of the navicular.  2. Small ill-defined subcutaneous fluid collection of the plantar heel   fat-pad that may reflect an adventitial bursa versus developing abscess   measuring up to 1.0 cm in size. Correlation is suggested for signs of   infection in this location.    --- End of Report ---      < end of copied text >         71 year old female with PMH DM, COPD (chronic asthma), history of Chronic Adrenal Insufficiency on Chronic prednisone history of colorectal cancer s/p resection (colostomy bag), Hx of CAD, Chronic A fib on Eliquis, bronchiectasis, and tracheomalacia and multiple intubations presenting with right foot wound concerning for OM s/p wound care. Course complicated by medication induced anaphylaxis 2/2 cefepime in setting of penicillin allergy. Further complicated by V-tach with pulse after epi administration, requiring lidocaine for . Patient was intubated and sedated for hypoxia and airway protection. Pt was successfully extubated, with improving mental status on nasal cannula. MRI showed osteomyelitis and developing abscess in R foot, with wound cultures showing MRSA and proteus mirabilis. Patient was continued on meropenem and vancomycin. Podiatry is following this patient, she is s/p right foot incision and drainage and resection of navicular bone  with Dr. Valenzuela. She was transferred to medicine floor on .     No ON events.  Pt denies R foot pain at this time, is continuing Percocet x2 q6. She denies fever, chills, abdominal pain, dysuria, n/v/d, constipation. CP, and shortness of breath. Right foot dressing is clean and dry with no drainage. Pt and daughter interested in [facility] for [facility] per conversation with CM.    REVIEW OF SYSTEMS:    CONSTITUTIONAL: No weakness, fevers or chills  EYES/ENT: No visual changes;  No vertigo or throat pain   NECK: No pain or stiffness  RESPIRATORY: No wheezing, hemoptysis, cough, no shortness of breath  CARDIOVASCULAR: No chest pain or palpitations  GASTROINTESTINAL: No abdominal or epigastric pain. No nausea, vomiting, or hematemesis; No diarrhea or constipation. No melena or hematochezia.  GENITOURINARY: No dysuria, frequency or hematuria  NEUROLOGICAL: No numbness or weakness  SKIN: No itching, rashes    MEDICATIONS  (STANDING):  albuterol/ipratropium for Nebulization 3 milliLiter(s) Nebulizer every 6 hours  atorvastatin 20 milliGRAM(s) Oral at bedtime  benzonatate 100 milliGRAM(s) Oral three times a day  chlorhexidine 2% Cloths 1 Application(s) Topical <User Schedule>  dextrose 5%. 1000 milliLiter(s) (100 mL/Hr) IV Continuous <Continuous>  dextrose 5%. 1000 milliLiter(s) (50 mL/Hr) IV Continuous <Continuous>  dextrose 50% Injectable 25 Gram(s) IV Push once  dextrose 50% Injectable 12.5 Gram(s) IV Push once  dextrose 50% Injectable 25 Gram(s) IV Push once  diltiazem    Tablet 60 milliGRAM(s) Oral two times a day  ertapenem  IVPB 1000 milliGRAM(s) IV Intermittent every 24 hours  famotidine Injectable 20 milliGRAM(s) IV Push daily  glucagon  Injectable 1 milliGRAM(s) IntraMuscular once  heparin  Infusion.  Unit(s)/Hr (11 mL/Hr) IV Continuous <Continuous>  insulin glargine Injectable (LANTUS) 14 Unit(s) SubCutaneous at bedtime  insulin lispro (ADMELOG) corrective regimen sliding scale   SubCutaneous three times a day before meals  insulin lispro (ADMELOG) corrective regimen sliding scale   SubCutaneous at bedtime  insulin lispro Injectable (ADMELOG) 6 Unit(s) SubCutaneous three times a day before meals  lisinopril 5 milliGRAM(s) Oral daily  melatonin 3 milliGRAM(s) Oral at bedtime  methylPREDNISolone sodium succinate Injectable 8 milliGRAM(s) IV Push daily  polyethylene glycol 3350 17 Gram(s) Oral daily  senna 2 Tablet(s) Oral at bedtime  tiotropium 18 MICROgram(s) Capsule 1 Capsule(s) Inhalation daily  vancomycin  IVPB 1000 milliGRAM(s) IV Intermittent every 12 hours    MEDICATIONS  (PRN):  acetaminophen     Tablet .. 650 milliGRAM(s) Oral every 6 hours PRN Temp greater or equal to 38C (100.4F)  dextrose Oral Gel 15 Gram(s) Oral once PRN Blood Glucose LESS THAN 70 milliGRAM(s)/deciliter  oxycodone    5 mG/acetaminophen 325 mG 2 Tablet(s) Oral every 6 hours PRN Severe Pain (7 - 10)  QUEtiapine 25 milliGRAM(s) Oral at bedtime PRN agitation    Vital Signs Last 24 Hrs  T(C): 36.8 (10 Jul 2022 04:20), Max: 36.9 (2022 16:00)  T(F): 98.3 (10 Jul 2022 04:20), Max: 98.4 (2022 16:00)  HR: 77 (10 Jul 2022 04:20) (77 - 89)  BP: 128/70 (10 Jul 2022 04:20) (118/68 - 134/56)  RR: 18 (10 Jul 2022 04:20) (18 - 18)  SpO2: 94% (10 Jul 2022 04:20) (94% - 100%)    Parameters below as of 10 Jul 2022 04:20  Patient On (Oxygen Delivery Method): room air    GENERAL: NAD, lying in bed comfortably  HEAD:  Atraumatic, normocephalic  EYES: EOMI, PERRLA, conjunctiva and sclera clear. Mild exophthalmos bilaterally   ENT: Moist mucous membranes  NECK: Supple, no JVD  HEART: Regular rate and rhythm, no murmurs, rubs, or gallops  LUNGS: Unlabored respirations, clear lungs. No wheezing.   ABDOMEN: Soft, nontender, nondistended, +BS, ostomy site clean with no sign of infection   EXTREMITIES: Right foot wrapped in elastic bandage with no apparent bleeding or drainage. 2+ peripheral pulses bilaterally. No clubbing, cyanosis, or edema  NERVOUS SYSTEM:  A&Ox3, no focal deficits   SKIN: No rashes or lesions                          9.5    13.14 )-----------( 428      ( 10 Jul 2022 07:29 )             32.3     07-10    143  |  103  |  16  ----------------------------<  136<H>  3.8   |  28  |  0.56    Ca    9.3      10 Jul 2022 07:24  Phos  3.2     07-10  Mg     1.9     07-10    TPro  6.1  /  Alb  3.2<L>  /  TBili  0.1<L>  /  DBili  x   /  AST  25  /  ALT  28  /  AlkPhos  69  07-10    PTT - ( 10 Jul 2022 07:30 )  PTT:54.1 sec    CAPILLARY BLOOD GLUCOSE    POCT Blood Glucose.: 201 mg/dL (10 Jul 2022 07:37)  POCT Blood Glucose.: 236 mg/dL (2022 21:01)  POCT Blood Glucose.: 379 mg/dL (2022 16:10)  POCT Blood Glucose.: 253 mg/dL (2022 11:44)    MICROBIOLOGY:    Vancomycin Level, Trough: 13.7 ug/mL (22)    .Tissue Culture   22:   Rare Proteus mirabilis ESBL --    Few polymorphonuclear leukocytes seen per low power field  No organisms seen per oil power field    ET Tube ET Tube  22   Moderate Proteus mirabilis ESBL  Normal Respiratory Jessica present  --  Proteus mirabilis ESBL    .Blood Blood-Peripheral  22   No Growth Final     .Abscess R foot wound  22   Numerous Proteus mirabilis ESBL  Numerous Methicillin Resistant Staphylococcus aureus  Moderate Corynebacterium species d"  --  Proteus mirabilis ESBL  Methicillin resistant Staphylococcus aureus    Clean Catch Clean Catch (Midstream)  22   <10,000 CFU/mL Normal Urogenital Jessica  --  --    .Blood Blood-Peripheral  22   No Growth Final  --  --    RADIOLOGY & ADDITIONAL TESTS:     < from: MR Foot w/wo IV Cont, Right (22 @ 23:43) >    INTERPRETATION:  MR HINDFOOT/ANKLE WITHOUT AND WITH IV CONTRAST RIGHT    HISTORY:  Diabetic foot swelling. Concern for osteomyelitis. Wound.    TECHNIQUE:  Multiplanar MRI of the right hindfoot/ankle was performed   without and with 5.5cc cc administered Gadavist intravenous gadolinium   contrast using 10 sequences.    COMPARISON:  Right foot x-rays dated 2022 and MRI dated 2022    FINDINGS:    OSSEOUS STRUCTURES    Acute Fractures/Contusions:  None.    Chronic Fractures/Ossifications:  None.    Marrow:  Soft tissue wound is present along the medial margin of the   navicular with underlying marrow edema with mild T1 marrow replacement   and enhancement of the medial margin of the navicular concerning for   osteomyelitis.    Tarsal Coalition:  None.    LIGAMENTS    Talofibular/Calcaneofibular Ligaments:  Intact.    Tibiofibular/Syndesmotic Ligaments:  Intact.    Medial Deltoid Ligament Complex:  Intact.    Spring/Subtalar Ligaments:  Intact.    TENDONS    Posterior Tibialis/Medial Flexor Tendons:  Intact.    Peroneal Tendons:  Intact.    Extensor Tendons:  Intact.    ACHILLES TENDON  Intact.    PLANTAR FASCIA  Intact.    JOINTS    Tibiotalar Joint:  Preserved.    Subtalar Joints:  Preserved.    Intertarsal Joints:  Preserved.    Tarsometatarsal Joints:  Preserved.    SOFT TISSUES    Masses/Cysts:  None.    Musculature:  Intact.    Subcutaneous Tissues:  Small ill-defined fluid collection is present   within the plantar heel fat-pad suggesting an adventitial bursa versus   developing abscess measuring up to 1 cm in size near the calcaneal   tuberosity and plantar fascia. There is mild surrounding enhancement.   Mild subcutaneous edema is present.    NEUROVASCULAR STRUCTURES    Tarsal Tunnel:  Preserved.    IMPRESSION:  1. Soft tissue wound along the medial margin of the navicular with   changes concerning for underlying osteomyelitis of the navicular.  2. Small ill-defined subcutaneous fluid collection of the plantar heel   fat-pad that may reflect an adventitial bursa versus developing abscess   measuring up to 1.0 cm in size. Correlation is suggested for signs of   infection in this location.    --- End of Report ---      < end of copied text >

## 2022-07-10 NOTE — PROGRESS NOTE ADULT - ASSESSMENT
71 year old female with PMH DM, COPD (chronic asthma), history of Chronic Adrenal Insufficiency on Chronic prednisone history of colorectal cancer s/p resection (colostomy bag), Hx of CAD, Chronic A fib on Eliquis, and tracheomalacia and multiple intubations presenting with right foot wound concerning for OM s/p wound care. Hospital course complicated by medicine-induced anaphylaxis 2/2 cefepime and V-tach w/pulse after epi administration, and patient was intubated and sedated for hypoxia and airway protection. Patient has since been extubated and she is s/p right foot incision and drainage and wound debridement 7/6 with Dr. Valenzuela. This AM she is resting comfortably in bed, pleasant, and appropriate, with pain well controlled on Percocet x2 q6; OR cultures still pending however per podiatry recs is prepared for d/c pending PICC placement with 6w course of vanc and ertapenem required for tx of osteomyelitis.    71 year old female with PMH DM, COPD (chronic asthma), history of Chronic Adrenal Insufficiency on Chronic prednisone history of colorectal cancer s/p resection (colostomy bag), Hx of CAD, Chronic A fib on Eliquis, and tracheomalacia and multiple intubations presenting with right foot wound concerning for OM s/p wound care. Hospital course complicated by medicine-induced anaphylaxis 2/2 cefepime and V-tach w/pulse after epi administration, and patient was intubated and sedated for hypoxia and airway protection. Patient has since been extubated and she is s/p right foot incision and drainage and wound debridement 7/6 with Dr. Valenzuela. This AM she is resting comfortably in bed, pleasant, and appropriate, with pain well controlled on Percocet x2 q6; OR cultures demonstrating Proteus mirabilis ESBL + MRSA; per podiatry recs is prepared for d/c w Abx administered through pt's Mediport, with 6w course of vanc and ertapenem required for tx of residual osteomyelitis.

## 2022-07-11 VITALS
RESPIRATION RATE: 18 BRPM | SYSTOLIC BLOOD PRESSURE: 146 MMHG | TEMPERATURE: 99 F | HEART RATE: 87 BPM | DIASTOLIC BLOOD PRESSURE: 71 MMHG | OXYGEN SATURATION: 98 %

## 2022-07-11 LAB
A1C WITH ESTIMATED AVERAGE GLUCOSE RESULT: 9.2 % — HIGH (ref 4–5.6)
ALBUMIN SERPL ELPH-MCNC: 3.3 G/DL — SIGNIFICANT CHANGE UP (ref 3.3–5)
ALP SERPL-CCNC: 71 U/L — SIGNIFICANT CHANGE UP (ref 40–120)
ALT FLD-CCNC: 37 U/L — SIGNIFICANT CHANGE UP (ref 10–45)
ANION GAP SERPL CALC-SCNC: 10 MMOL/L — SIGNIFICANT CHANGE UP (ref 5–17)
AST SERPL-CCNC: 24 U/L — SIGNIFICANT CHANGE UP (ref 10–40)
BILIRUB SERPL-MCNC: 0.2 MG/DL — SIGNIFICANT CHANGE UP (ref 0.2–1.2)
BUN SERPL-MCNC: 18 MG/DL — SIGNIFICANT CHANGE UP (ref 7–23)
CALCIUM SERPL-MCNC: 9 MG/DL — SIGNIFICANT CHANGE UP (ref 8.4–10.5)
CHLORIDE SERPL-SCNC: 104 MMOL/L — SIGNIFICANT CHANGE UP (ref 96–108)
CO2 SERPL-SCNC: 28 MMOL/L — SIGNIFICANT CHANGE UP (ref 22–31)
CREAT SERPL-MCNC: 0.67 MG/DL — SIGNIFICANT CHANGE UP (ref 0.5–1.3)
CULTURE RESULTS: SIGNIFICANT CHANGE UP
EGFR: 92 ML/MIN/1.73M2 — SIGNIFICANT CHANGE UP
ESTIMATED AVERAGE GLUCOSE: 217 MG/DL — HIGH (ref 68–114)
GLUCOSE BLDC GLUCOMTR-MCNC: 143 MG/DL — HIGH (ref 70–99)
GLUCOSE BLDC GLUCOMTR-MCNC: 186 MG/DL — HIGH (ref 70–99)
GLUCOSE BLDC GLUCOMTR-MCNC: 221 MG/DL — HIGH (ref 70–99)
GLUCOSE SERPL-MCNC: 204 MG/DL — HIGH (ref 70–99)
HCT VFR BLD CALC: 31.4 % — LOW (ref 34.5–45)
HGB BLD-MCNC: 9.3 G/DL — LOW (ref 11.5–15.5)
MAGNESIUM SERPL-MCNC: 1.8 MG/DL — SIGNIFICANT CHANGE UP (ref 1.6–2.6)
MCHC RBC-ENTMCNC: 20.7 PG — LOW (ref 27–34)
MCHC RBC-ENTMCNC: 29.6 GM/DL — LOW (ref 32–36)
MCV RBC AUTO: 69.8 FL — LOW (ref 80–100)
NRBC # BLD: 0 /100 WBCS — SIGNIFICANT CHANGE UP (ref 0–0)
ORGANISM # SPEC MICROSCOPIC CNT: SIGNIFICANT CHANGE UP
PHOSPHATE SERPL-MCNC: 3.1 MG/DL — SIGNIFICANT CHANGE UP (ref 2.5–4.5)
PLATELET # BLD AUTO: 423 K/UL — HIGH (ref 150–400)
POTASSIUM SERPL-MCNC: 3.9 MMOL/L — SIGNIFICANT CHANGE UP (ref 3.5–5.3)
POTASSIUM SERPL-SCNC: 3.9 MMOL/L — SIGNIFICANT CHANGE UP (ref 3.5–5.3)
PROT SERPL-MCNC: 6.1 G/DL — SIGNIFICANT CHANGE UP (ref 6–8.3)
RBC # BLD: 4.5 M/UL — SIGNIFICANT CHANGE UP (ref 3.8–5.2)
RBC # FLD: 16.6 % — HIGH (ref 10.3–14.5)
SODIUM SERPL-SCNC: 142 MMOL/L — SIGNIFICANT CHANGE UP (ref 135–145)
SPECIMEN SOURCE: SIGNIFICANT CHANGE UP
WBC # BLD: 12.54 K/UL — HIGH (ref 3.8–10.5)
WBC # FLD AUTO: 12.54 K/UL — HIGH (ref 3.8–10.5)

## 2022-07-11 PROCEDURE — 82330 ASSAY OF CALCIUM: CPT

## 2022-07-11 PROCEDURE — 36415 COLL VENOUS BLD VENIPUNCTURE: CPT

## 2022-07-11 PROCEDURE — 82947 ASSAY GLUCOSE BLOOD QUANT: CPT

## 2022-07-11 PROCEDURE — 73620 X-RAY EXAM OF FOOT: CPT

## 2022-07-11 PROCEDURE — 87040 BLOOD CULTURE FOR BACTERIA: CPT

## 2022-07-11 PROCEDURE — 84295 ASSAY OF SERUM SODIUM: CPT

## 2022-07-11 PROCEDURE — 96367 TX/PROPH/DG ADDL SEQ IV INF: CPT

## 2022-07-11 PROCEDURE — 85014 HEMATOCRIT: CPT

## 2022-07-11 PROCEDURE — 80048 BASIC METABOLIC PNL TOTAL CA: CPT

## 2022-07-11 PROCEDURE — 83880 ASSAY OF NATRIURETIC PEPTIDE: CPT

## 2022-07-11 PROCEDURE — 87086 URINE CULTURE/COLONY COUNT: CPT

## 2022-07-11 PROCEDURE — 85018 HEMOGLOBIN: CPT

## 2022-07-11 PROCEDURE — 87075 CULTR BACTERIA EXCEPT BLOOD: CPT

## 2022-07-11 PROCEDURE — 85652 RBC SED RATE AUTOMATED: CPT

## 2022-07-11 PROCEDURE — U0003: CPT

## 2022-07-11 PROCEDURE — 81001 URINALYSIS AUTO W/SCOPE: CPT

## 2022-07-11 PROCEDURE — 82435 ASSAY OF BLOOD CHLORIDE: CPT

## 2022-07-11 PROCEDURE — U0005: CPT

## 2022-07-11 PROCEDURE — 87641 MR-STAPH DNA AMP PROBE: CPT

## 2022-07-11 PROCEDURE — G1004: CPT

## 2022-07-11 PROCEDURE — 83605 ASSAY OF LACTIC ACID: CPT

## 2022-07-11 PROCEDURE — 87640 STAPH A DNA AMP PROBE: CPT

## 2022-07-11 PROCEDURE — 96366 THER/PROPH/DIAG IV INF ADDON: CPT

## 2022-07-11 PROCEDURE — 99239 HOSP IP/OBS DSCHRG MGMT >30: CPT

## 2022-07-11 PROCEDURE — 94003 VENT MGMT INPAT SUBQ DAY: CPT

## 2022-07-11 PROCEDURE — 86900 BLOOD TYPING SEROLOGIC ABO: CPT

## 2022-07-11 PROCEDURE — 87070 CULTURE OTHR SPECIMN AEROBIC: CPT

## 2022-07-11 PROCEDURE — 86901 BLOOD TYPING SEROLOGIC RH(D): CPT

## 2022-07-11 PROCEDURE — 87637 SARSCOV2&INF A&B&RSV AMP PRB: CPT

## 2022-07-11 PROCEDURE — 86850 RBC ANTIBODY SCREEN: CPT

## 2022-07-11 PROCEDURE — 99232 SBSQ HOSP IP/OBS MODERATE 35: CPT

## 2022-07-11 PROCEDURE — 80202 ASSAY OF VANCOMYCIN: CPT

## 2022-07-11 PROCEDURE — 82803 BLOOD GASES ANY COMBINATION: CPT

## 2022-07-11 PROCEDURE — 71045 X-RAY EXAM CHEST 1 VIEW: CPT

## 2022-07-11 PROCEDURE — 99285 EMERGENCY DEPT VISIT HI MDM: CPT | Mod: 25

## 2022-07-11 PROCEDURE — 73720 MRI LWR EXTREMITY W/O&W/DYE: CPT | Mod: ME

## 2022-07-11 PROCEDURE — 84132 ASSAY OF SERUM POTASSIUM: CPT

## 2022-07-11 PROCEDURE — 80053 COMPREHEN METABOLIC PANEL: CPT

## 2022-07-11 PROCEDURE — 96376 TX/PRO/DX INJ SAME DRUG ADON: CPT

## 2022-07-11 PROCEDURE — 97530 THERAPEUTIC ACTIVITIES: CPT

## 2022-07-11 PROCEDURE — 94640 AIRWAY INHALATION TREATMENT: CPT

## 2022-07-11 PROCEDURE — 84484 ASSAY OF TROPONIN QUANT: CPT

## 2022-07-11 PROCEDURE — A9585: CPT

## 2022-07-11 PROCEDURE — 86140 C-REACTIVE PROTEIN: CPT

## 2022-07-11 PROCEDURE — 84100 ASSAY OF PHOSPHORUS: CPT

## 2022-07-11 PROCEDURE — 97162 PT EVAL MOD COMPLEX 30 MIN: CPT

## 2022-07-11 PROCEDURE — 87186 SC STD MICRODIL/AGAR DIL: CPT

## 2022-07-11 PROCEDURE — 82565 ASSAY OF CREATININE: CPT

## 2022-07-11 PROCEDURE — 85610 PROTHROMBIN TIME: CPT

## 2022-07-11 PROCEDURE — 85025 COMPLETE CBC W/AUTO DIFF WBC: CPT

## 2022-07-11 PROCEDURE — 85027 COMPLETE CBC AUTOMATED: CPT

## 2022-07-11 PROCEDURE — 87077 CULTURE AEROBIC IDENTIFY: CPT

## 2022-07-11 PROCEDURE — 70450 CT HEAD/BRAIN W/O DYE: CPT

## 2022-07-11 PROCEDURE — 93005 ELECTROCARDIOGRAM TRACING: CPT

## 2022-07-11 PROCEDURE — 96372 THER/PROPH/DIAG INJ SC/IM: CPT | Mod: XU

## 2022-07-11 PROCEDURE — 83036 HEMOGLOBIN GLYCOSYLATED A1C: CPT

## 2022-07-11 PROCEDURE — 85730 THROMBOPLASTIN TIME PARTIAL: CPT

## 2022-07-11 PROCEDURE — 87205 SMEAR GRAM STAIN: CPT

## 2022-07-11 PROCEDURE — 97116 GAIT TRAINING THERAPY: CPT

## 2022-07-11 PROCEDURE — 96365 THER/PROPH/DIAG IV INF INIT: CPT

## 2022-07-11 PROCEDURE — 96375 TX/PRO/DX INJ NEW DRUG ADDON: CPT

## 2022-07-11 PROCEDURE — 83735 ASSAY OF MAGNESIUM: CPT

## 2022-07-11 PROCEDURE — 82962 GLUCOSE BLOOD TEST: CPT

## 2022-07-11 PROCEDURE — 73630 X-RAY EXAM OF FOOT: CPT

## 2022-07-11 RX ORDER — OXYCODONE AND ACETAMINOPHEN 5; 325 MG/1; MG/1
1 TABLET ORAL
Qty: 0 | Refills: 0 | DISCHARGE
Start: 2022-07-11

## 2022-07-11 RX ORDER — VANCOMYCIN HCL 1 G
1000 VIAL (EA) INTRAVENOUS EVERY 12 HOURS
Refills: 0 | Status: DISCONTINUED | OUTPATIENT
Start: 2022-07-11 | End: 2022-07-11

## 2022-07-11 RX ORDER — OXYCODONE AND ACETAMINOPHEN 5; 325 MG/1; MG/1
1 TABLET ORAL EVERY 6 HOURS
Refills: 0 | Status: DISCONTINUED | OUTPATIENT
Start: 2022-07-11 | End: 2022-07-11

## 2022-07-11 RX ORDER — LISINOPRIL 2.5 MG/1
1 TABLET ORAL
Qty: 0 | Refills: 0 | DISCHARGE
Start: 2022-07-11

## 2022-07-11 RX ADMIN — Medication 8 MILLIGRAM(S): at 06:35

## 2022-07-11 RX ADMIN — Medication 9 UNIT(S): at 16:40

## 2022-07-11 RX ADMIN — Medication 2: at 16:40

## 2022-07-11 RX ADMIN — Medication 250 MILLIGRAM(S): at 06:38

## 2022-07-11 RX ADMIN — LISINOPRIL 5 MILLIGRAM(S): 2.5 TABLET ORAL at 06:36

## 2022-07-11 RX ADMIN — Medication 100 MILLIGRAM(S): at 14:55

## 2022-07-11 RX ADMIN — Medication 60 MILLIGRAM(S): at 06:37

## 2022-07-11 RX ADMIN — CHLORHEXIDINE GLUCONATE 1 APPLICATION(S): 213 SOLUTION TOPICAL at 06:38

## 2022-07-11 RX ADMIN — APIXABAN 5 MILLIGRAM(S): 2.5 TABLET, FILM COATED ORAL at 06:37

## 2022-07-11 RX ADMIN — OXYCODONE AND ACETAMINOPHEN 2 TABLET(S): 5; 325 TABLET ORAL at 07:34

## 2022-07-11 RX ADMIN — APIXABAN 5 MILLIGRAM(S): 2.5 TABLET, FILM COATED ORAL at 18:15

## 2022-07-11 RX ADMIN — Medication 3 MILLILITER(S): at 00:56

## 2022-07-11 RX ADMIN — Medication 9 UNIT(S): at 08:09

## 2022-07-11 RX ADMIN — ERTAPENEM SODIUM 120 MILLIGRAM(S): 1 INJECTION, POWDER, LYOPHILIZED, FOR SOLUTION INTRAMUSCULAR; INTRAVENOUS at 14:55

## 2022-07-11 RX ADMIN — Medication 3 MILLILITER(S): at 06:46

## 2022-07-11 RX ADMIN — FAMOTIDINE 20 MILLIGRAM(S): 10 INJECTION INTRAVENOUS at 14:55

## 2022-07-11 RX ADMIN — Medication 3 MILLILITER(S): at 11:50

## 2022-07-11 RX ADMIN — TIOTROPIUM BROMIDE 1 CAPSULE(S): 18 CAPSULE ORAL; RESPIRATORY (INHALATION) at 11:50

## 2022-07-11 RX ADMIN — Medication 4: at 08:09

## 2022-07-11 RX ADMIN — OXYCODONE AND ACETAMINOPHEN 2 TABLET(S): 5; 325 TABLET ORAL at 08:30

## 2022-07-11 RX ADMIN — Medication 9 UNIT(S): at 11:49

## 2022-07-11 RX ADMIN — Medication 250 MILLIGRAM(S): at 16:04

## 2022-07-11 RX ADMIN — Medication 100 MILLIGRAM(S): at 06:36

## 2022-07-11 RX ADMIN — Medication 60 MILLIGRAM(S): at 18:15

## 2022-07-11 RX ADMIN — Medication 3 MILLILITER(S): at 18:31

## 2022-07-11 NOTE — PROGRESS NOTE ADULT - PROBLEM SELECTOR PLAN 3
- Hx of chronic atrial fibrillation on Eliquis   - Episode of Vtach w/pulse following epi treatment for medication-induced anaphylaxis, requiring lidocaine for    - Heparin drip d/c today to restart home Eliquis 5mg BID  - 1 episode PAF for 5.8 seconds yesterday PM; pt asymptomatic with VSS

## 2022-07-11 NOTE — DIETITIAN INITIAL EVALUATION ADULT - REASON INDICATOR FOR ASSESSMENT
RD consult for Pressure Injury Stage 2 or >. Source: pt, medical record. Chart reviewed, events noted.

## 2022-07-11 NOTE — DIETITIAN INITIAL EVALUATION ADULT - REASON FOR ADMISSION
Anaphylactic shock, unspecified, initial encounter    "71 year old female with PMH DM, COPD (chronic asthma), history of Chronic Adrenal Insufficiency on Chronic prednisone history of colorectal cancer s/p resection (colostomy bag), Hx of CAD, Chronic A fib on Eliquis, bronchiectasis, and tracheomalacia and multiple intubations presenting with right foot wound concerning for OM s/p wound care. Course complicated by medication induced anaphylaxis 2/2 cefepime in setting of penicillin allergy. Further complicated by V-tach with pulse after epi administration, requiring lidocaine for . Patient was intubated and sedated for hypoxia and airway protection. Pt was successfully extubated, with improving mental status on nasal cannula. MRI showed osteomyelitis and developing abscess in R foot, with wound cultures showing MRSA and proteus mirabilis. Patient was continued on meropenem and vancomycin. Podiatry is following this patient, she is s/p right foot incision and drainage and resection of navicular bone  with Dr. Valenzuela. She was transferred to medicine floor on ."

## 2022-07-11 NOTE — PROGRESS NOTE ADULT - ATTENDING COMMENTS
71 year old female with PMH DM, COPD (chronic asthma), history of Chronic Adrenal Insufficiency on Chronic prednisone history of colorectal cancer s/p resection (colostomy bag), Hx of CAD, Chronic A fib on Eliquis, and tracheomalacia and multiple intubations presenting with right foot wound concerning for OM s/p wound care. Hospital course complicated by medicine-induced anaphylaxis 2/2 cefepime and V-tach w/pulse after epi administration, and patient was intubated and sedated for hypoxia and airway protection. Patient has since been extubated and she is s/p right foot incision and drainage and wound debridement 7/6 with Dr. Valenzuela.    #foot osteomyelitis  continue Vanc, Ertapenem per ID recs. check vanc trough daily. Abx till 8/17, can be given via port clarified by nursing staff   OR tissue culture 7/6 with Proteus ESBL, MRSA   F/U podiatry for further recs  pain management - percocet prn, tylenol prn. monitor ostomy output    #DM type 2-uncontrolled  switch IV meds from dextrose to NS  increased lantus to 15u hs, continue admelog 9u tidacmeals. monitor fs, goal 100-180    #?hypoxia, asthma, dry cough  weaned off supplemental oxygen  continue duonebs, start tessalon.  incentive spirometer    #hypertension  continue home cardizem, started lisinopril with hold parameters     #chronic adrenal insufficiency  home dose methylprednisolone     #new rash, suspicious for fungal infection  derm consult    #decub ulcer  wound nurse consult     Discharge planning. to [facility] - F/U CM

## 2022-07-11 NOTE — DIETITIAN INITIAL EVALUATION ADULT - NSFNSNUTRHOMESUPPLEMENTFT_GEN_A_CORE
Pt denies use of any nutrition/dietary supplements PTA, stating she eats well enough at home that she doesn't need any supplements.

## 2022-07-11 NOTE — PROGRESS NOTE ADULT - PROBLEM SELECTOR PLAN 1
- S/p I&D and debridement of right foot osteomyelitis and abscess, right navicular bone resection on 7/6 with Dr. Valenzueal and Podiatry team   - ID recommendations: 6 weeks IV Abx with vancomycin 1g q12 and ertapenem 1g qd  - Per ID and nurse manager, pt can receive IV Abx through Mediport thus no longer requiring PICC placement.    - Start date for Abx is considered 7/6/22  -- Pt will also require weekly trough, CBC, and serum Cr while receiving IV Abx  - Vanc trough before every 4th dose to clarify serum vanc levels, most recent vanc trough 13.7 7/9  - OR culture final: Proteus ESBL and MRSA

## 2022-07-11 NOTE — PROGRESS NOTE ADULT - PROVIDER SPECIALTY LIST ADULT
Podiatry
Internal Medicine
Podiatry
Infectious Disease
MICU
Podiatry
Podiatry
Infectious Disease
MICU
Internal Medicine

## 2022-07-11 NOTE — DIETITIAN INITIAL EVALUATION ADULT - PERSON TAUGHT/METHOD
-Discussed importance of adequate protein-energy consumption to meet estimated nutrient needs. Encouraged intake of meals and oral nutrition supplements as tolerated. Encouraged intake of protein-rich foods first at mealtimes to help preserve lean muscle mass. Reviewed food choices that are high in protein.   - Provided education on Nutrition Therapy for Type 2 Diabetes. Emphasis on what foods contain carbohydrates, importance of pairing carbohydrates with protein for glycemic control; choosing whole grains vs refined carbohydrates; limiting refined sugars, portion sizes. Good comprehension noted. Pt made aware RD to remain available for any questions./verbal instruction/patient instructed

## 2022-07-11 NOTE — CONSULT NOTE ADULT - ATTENDING COMMENTS
I was physically present for the key portions of the evaluation and management (E/M) service provided.  I agree with the above history, physical, and plan which I have reviewed and edited where appropriate.
71 f with DM, CAD, a-fib, asthma/COPD, Chronic Adrenal Insufficiency on steroids, history of colorectal cancer s/p resection and ostomy, tracheomalacia and multiple intubations, p/w R foot wound  febrile to 100.7, was given vanco and cefepime and after cefepime had a ?anaphylaxis with hypotension, tachycardia, swelling, hypoxia was intubated   WBC increased to 22, wound cx: MRSA, ESBL proteus and corynebacterium  foot MRI 7/3: Soft tissue wound with osteomyelitis of the navicular,  plantar heel abscess  s/p I&D and resection of navicular bone 7/6 but high suspicion for residual osteo    leukocytosis, fever, sepsis, diabetic foot infection, abscess and osteo  ?anaphylaxis to cefepime in ER  adrenal insufficiency on steroids     * f/u the OR cultures  * switch suraj to ertapenem 1 qd  * c/w vanco 1 q 12  * monitor vanco trough and renal function to prevent nephrotoxicity  * will need a picc line and 6 weeks of antibiotics     The above assessment and plan was discussed with the primary team    Michelle Rolon MD  contact on teams  After 5pm and on weekends call [phone]

## 2022-07-11 NOTE — PROGRESS NOTE ADULT - SUBJECTIVE AND OBJECTIVE BOX
Follow Up:  diabetic foot infection and osteo    Interval History: pt afebrile and normal WBC    ROS:      All other systems negative    Constitutional: no fever, no chills  Cardiovascular:  no chest pain, no palpitation  Respiratory:  no SOB, no cough  GI:  no abd pain, no vomiting, no diarrhea  urinary: no dysuria, no hematuria, no flank pain  musculoskeletal:  R foot wound and pain  skin:  no rash  neurology:  no headache, no seizur      Allergies  aspirin (Short breath)  Avelox (Short breath; Pruritus)  cefepime (Anaphylaxis)  codeine (Short breath)  Dilaudid (Short breath)  iodine (Short breath; Swelling)  penicillin (Anaphylaxis)  shellfish (Anaphylaxis)  tetanus toxoid (Short breath)  Valium (Short breath)        ANTIMICROBIALS:  ertapenem  IVPB 1000 every 24 hours  vancomycin  IVPB 1000 every 12 hours      OTHER MEDS:  acetaminophen     Tablet .. 650 milliGRAM(s) Oral every 6 hours PRN  albuterol/ipratropium for Nebulization 3 milliLiter(s) Nebulizer every 6 hours  apixaban 5 milliGRAM(s) Oral every 12 hours  atorvastatin 20 milliGRAM(s) Oral at bedtime  benzonatate 100 milliGRAM(s) Oral three times a day  chlorhexidine 2% Cloths 1 Application(s) Topical <User Schedule>  dextrose 5%. 1000 milliLiter(s) IV Continuous <Continuous>  dextrose 5%. 1000 milliLiter(s) IV Continuous <Continuous>  dextrose 50% Injectable 25 Gram(s) IV Push once  dextrose 50% Injectable 12.5 Gram(s) IV Push once  dextrose 50% Injectable 25 Gram(s) IV Push once  dextrose Oral Gel 15 Gram(s) Oral once PRN  diltiazem    Tablet 60 milliGRAM(s) Oral two times a day  famotidine Injectable 20 milliGRAM(s) IV Push daily  glucagon  Injectable 1 milliGRAM(s) IntraMuscular once  insulin glargine Injectable (LANTUS) 15 Unit(s) SubCutaneous at bedtime  insulin lispro (ADMELOG) corrective regimen sliding scale   SubCutaneous three times a day before meals  insulin lispro (ADMELOG) corrective regimen sliding scale   SubCutaneous at bedtime  insulin lispro Injectable (ADMELOG) 9 Unit(s) SubCutaneous three times a day before meals  lisinopril 5 milliGRAM(s) Oral daily  melatonin 3 milliGRAM(s) Oral at bedtime  methylPREDNISolone sodium succinate Injectable 8 milliGRAM(s) IV Push daily  oxycodone    5 mG/acetaminophen 325 mG 1 Tablet(s) Oral every 6 hours PRN  polyethylene glycol 3350 17 Gram(s) Oral daily  senna 2 Tablet(s) Oral at bedtime  tiotropium 18 MICROgram(s) Capsule 1 Capsule(s) Inhalation daily      Vital Signs Last 24 Hrs  T(C): 36.6 (11 Jul 2022 12:02), Max: 36.9 (10 Jul 2022 20:16)  T(F): 97.8 (11 Jul 2022 12:02), Max: 98.5 (10 Jul 2022 20:16)  HR: 74 (11 Jul 2022 12:02) (72 - 93)  BP: 146/62 (11 Jul 2022 12:02) (120/73 - 161/72)  BP(mean): --  RR: 16 (11 Jul 2022 12:02) (16 - 18)  SpO2: 94% (11 Jul 2022 12:02) (93% - 98%)    Parameters below as of 11 Jul 2022 12:02  Patient On (Oxygen Delivery Method): room air        Physical Exam:  General:    NAD,  non toxic  Respiratory:  comfortable on RA  abd:     soft,   BS +,   no tenderness  :   no CVAT,  no suprapubic tenderness,   no  ramirez  Musculoskeletal:   R foot wound s/p I&D now clean  vascular: no phlebitis  Skin:    no rash                        9.3    12.54 )-----------( 423      ( 11 Jul 2022 07:36 )             31.4       07-11    142  |  104  |  18  ----------------------------<  204<H>  3.9   |  28  |  0.67    Ca    9.0      11 Jul 2022 07:36  Phos  3.1     07-11  Mg     1.8     07-11    TPro  6.1  /  Alb  3.3  /  TBili  0.2  /  DBili  x   /  AST  24  /  ALT  37  /  AlkPhos  71  07-11          MICROBIOLOGY:  v  .Tissue Other  07-06-22   Rare Proteus mirabilis ESBL  --  Proteus mirabilis ESBL      ET Tube ET Tube  07-03-22   Moderate Proteus mirabilis ESBL  Normal Respiratory Jessica present  --  Proteus mirabilis ESBL      .Blood Blood-Peripheral  07-02-22   No Growth Final  --  --      .Abscess R foot wound  07-02-22   Numerous Proteus mirabilis ESBL  Numerous Methicillin Resistant Staphylococcus aureus  Moderate Corynebacterium species "Susceptibilities not performed"  --  Proteus mirabilis ESBL  Methicillin resistant Staphylococcus aureus      Clean Catch Clean Catch (Midstream)  07-02-22   <10,000 CFU/mL Normal Urogenital Jessica  --  --      .Blood Blood-Peripheral  07-02-22   No Growth Final  --  --                RADIOLOGY:  Images independently visualized and reviewed personally, findings as below  < from: Xray Foot AP + Lateral, Right (07.06.22 @ 15:08) >    IMPRESSION:  Postsurgical soft tissue and focal osseous changes in medial navicular   region.    No proximally tracking gas collections or focal areas of osteomyelitis.    Remainder of foot unchanged.    Also correlate with intraoperative findings.    < end of copied text >  < from: MR Foot w/wo IV Cont, Right (07.03.22 @ 23:43) >    IMPRESSION:  1. Soft tissue wound along the medial margin of the navicular with   changes concerning for underlying osteomyelitis of the navicular.  2. Small ill-defined subcutaneous fluid collection of the plantar heel   fat-pad that may reflect an adventitial bursa versus developing abscess   measuring up to 1.0 cm in size. Correlation is suggested for signs of   infection in this location.    < end of copied text >

## 2022-07-11 NOTE — PROGRESS NOTE ADULT - SUBJECTIVE AND OBJECTIVE BOX
Patient is a 71y old  Female who presents with a chief complaint of Anaphylaxis (11 Jul 2022 07:42)       INTERVAL HPI/OVERNIGHT EVENTS:  Patient seen and evaluated at bedside.  Pt is resting comfortable in NAD. Denies N/V/F/C.      Allergies    aspirin (Short breath)  Avelox (Short breath; Pruritus)  cefepime (Anaphylaxis)  codeine (Short breath)  Dilaudid (Short breath)  iodine (Short breath; Swelling)  penicillin (Anaphylaxis)  shellfish (Anaphylaxis)  tetanus toxoid (Short breath)  Valium (Short breath)    Intolerances        Vital Signs Last 24 Hrs  T(C): 36.9 (11 Jul 2022 04:00), Max: 36.9 (10 Jul 2022 11:51)  T(F): 98.5 (11 Jul 2022 04:00), Max: 98.5 (10 Jul 2022 20:16)  HR: 80 (11 Jul 2022 04:00) (75 - 93)  BP: 143/67 (11 Jul 2022 04:00) (120/73 - 161/72)  BP(mean): --  RR: 18 (11 Jul 2022 04:00) (18 - 18)  SpO2: 97% (11 Jul 2022 04:00) (95% - 98%)    Parameters below as of 11 Jul 2022 04:00  Patient On (Oxygen Delivery Method): room air        LABS:                        9.3    12.54 )-----------( 423      ( 11 Jul 2022 07:36 )             31.4     07-11    142  |  104  |  18  ----------------------------<  204<H>  3.9   |  28  |  0.67    Ca    9.0      11 Jul 2022 07:36  Phos  3.1     07-11  Mg     1.8     07-11    TPro  6.1  /  Alb  3.3  /  TBili  0.2  /  DBili  x   /  AST  24  /  ALT  37  /  AlkPhos  71  07-11    PTT - ( 10 Jul 2022 14:36 )  PTT:129.5 sec    CAPILLARY BLOOD GLUCOSE      POCT Blood Glucose.: 221 mg/dL (11 Jul 2022 07:58)  POCT Blood Glucose.: 286 mg/dL (10 Jul 2022 22:04)  POCT Blood Glucose.: 229 mg/dL (10 Jul 2022 16:31)  POCT Blood Glucose.: 290 mg/dL (10 Jul 2022 11:30)      Lower Extremity Physical Exam:  Vascular: DP/PT 2/4 B/L, CFT <3 seconds B/L, Temperature gradient warm to cool B/L  Neuro: Epicritic sensation intact to the level of midfoot B/L  Musculoskeletal/Ortho: unremarkable   Skin: right foot medial navicular wound to bone w resolving erythema periwound, no malodor, no bogginess, no drainage noted, no tracking noted    RADIOLOGY & ADDITIONAL TESTS:

## 2022-07-11 NOTE — DIETITIAN INITIAL EVALUATION ADULT - ORAL INTAKE PTA/DIET HISTORY
Pt interviewed at bedside. Reports very good appetite/PO intake PTA, follows a DM diet. States NKFA. Shellfish allergy noted in chart, likely in setting of iodine allergy.

## 2022-07-11 NOTE — CONSULT NOTE ADULT - SUBJECTIVE AND OBJECTIVE BOX
HPI:  71 year old female with PMH DM, COPD (chronic asthma), history of Chronic Adrenal Insufficiency on Chronic prednisone history of colorectal cancer s/p resection (colostomy bag), Hx of CAD, Chronic A fib on Eliquis, bronchiectasis, and tracheomalacia and multiple intubations presenting with right foot wound concerning for OM s/p wound care. Course complicated by medication induced anaphylaxis 2/2 cefepime in setting of penicillin allergy. Further complicated by V-tach with pulse after epi administration, requiring lidocaine for . Patient was intubated and sedated for hypoxia and airway protection. Pt was successfully extubated, with improving mental status on nasal cannula. MRI showed osteomyelitis and developing abscess in R foot, with wound cultures showing MRSA and proteus mirabilis. Patient was continued on meropenem and vancomycin. Podiatryfollowing this patient, she is s/p right foot incision and drainage and resection of navicular bone  with Dr. Valenzuela. She was transferred to medicine floor on .     No ON events.  P She also has noticed a new, hypopigmented rash that is itchy.   She denies fever, chills, abdominal pain, dysuria, n/v/d, constipation. CP, and shortness of breath.   Dermatology consulted for rash and itch x several days. No hx of previous and no tx tried    PAST MEDICAL & SURGICAL HISTORY:  Atrial fibrillation  paroxysmal, on eliquis  Diabetes  Type 2  COPD (chronic obstructive pulmonary disease)  Adrenal insufficiency  Medrol daily for over 50 years  Aortic insufficiency  moderate AR on echo 5/3/2018  Pelvic fracture  Asthma  Tracheobronchomalacia  diagnosed , s/p bronchial thermoplasty  (Dr Zapien); recent bronchoscopy 2018 revealed no evidence of tracheobronchomalacia in trachea or bronchial tubes  Colorectal cancer  2018- last treatment , chemo and radiation  Rectal bleeding  Seizure  x 1 18  DVT (deep venous thrombosis)  15-20 years ago, took coumadin  TIA (transient ischemic attack)  multiple, last 5 years ago - presents as right-sided weakness  History of partial hysterectomy  30 years ago - fibroids  H/O total knee replacement, bilateral  5 years ago  History of sinus surgery  multiple sinus surgeries  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  H/O pelvic surgery  5 years ago - s/p fracture  History of tracheomalacia   - attempted tracheal stenting ([facility])- course complicated by obstruction, respiratory failure, multiple CPR attempts -  stent discontinued; 10/20/2016 Tracheobronchoplasty (Prolene Mesh) performed at [facility] by Dr Zapien  S/P bronchoscopy  2018 - [place] [facility] (Dr Zapien) no evidence of tracheobronchomalacia in trachea or bronchial tubes  Rectal bleeding  exam under anesthesia ([facility]) 2018    REVIEW OF SYSTEMS  per HPI    MEDICATIONS  (STANDING):  albuterol/ipratropium for Nebulization 3 milliLiter(s) Nebulizer every 6 hours  apixaban 5 milliGRAM(s) Oral every 12 hours  atorvastatin 20 milliGRAM(s) Oral at bedtime  benzonatate 100 milliGRAM(s) Oral three times a day  chlorhexidine 2% Cloths 1 Application(s) Topical <User Schedule>  dextrose 5%. 1000 milliLiter(s) (100 mL/Hr) IV Continuous <Continuous>  dextrose 5%. 1000 milliLiter(s) (50 mL/Hr) IV Continuous <Continuous>  dextrose 50% Injectable 25 Gram(s) IV Push once  dextrose 50% Injectable 12.5 Gram(s) IV Push once  dextrose 50% Injectable 25 Gram(s) IV Push once  diltiazem    Tablet 60 milliGRAM(s) Oral two times a day  ertapenem  IVPB 1000 milliGRAM(s) IV Intermittent every 24 hours  famotidine Injectable 20 milliGRAM(s) IV Push daily  glucagon  Injectable 1 milliGRAM(s) IntraMuscular once  insulin glargine Injectable (LANTUS) 15 Unit(s) SubCutaneous at bedtime  insulin lispro (ADMELOG) corrective regimen sliding scale   SubCutaneous three times a day before meals  insulin lispro (ADMELOG) corrective regimen sliding scale   SubCutaneous at bedtime  insulin lispro Injectable (ADMELOG) 9 Unit(s) SubCutaneous three times a day before meals  lisinopril 5 milliGRAM(s) Oral daily  melatonin 3 milliGRAM(s) Oral at bedtime  methylPREDNISolone sodium succinate Injectable 8 milliGRAM(s) IV Push daily  polyethylene glycol 3350 17 Gram(s) Oral daily  senna 2 Tablet(s) Oral at bedtime  tiotropium 18 MICROgram(s) Capsule 1 Capsule(s) Inhalation daily  vancomycin  IVPB 1000 milliGRAM(s) IV Intermittent every 12 hours    MEDICATIONS  (PRN):  acetaminophen     Tablet .. 650 milliGRAM(s) Oral every 6 hours PRN Temp greater or equal to 38C (100.4F)  dextrose Oral Gel 15 Gram(s) Oral once PRN Blood Glucose LESS THAN 70 milliGRAM(s)/deciliter  oxycodone    5 mG/acetaminophen 325 mG 1 Tablet(s) Oral every 6 hours PRN Severe Pain (7 - 10)      Allergies    aspirin (Short breath)  Avelox (Short breath; Pruritus)  cefepime (Anaphylaxis)  codeine (Short breath)  Dilaudid (Short breath)  iodine (Short breath; Swelling)  penicillin (Anaphylaxis)  shellfish (Anaphylaxis)  tetanus toxoid (Short breath)  Valium (Short breath)    Intolerances        SOCIAL HISTORY:  Patient denies tobacco or IVDU. Daughter Yoana is health care proxy    FAMILY HISTORY:  Family history of asthma  Family history of breast cancer (Sibling)  Family history of diabetes mellitus type II    Vital Signs Last 24 Hrs  T(C): 36.8 (2022 16:02), Max: 36.9 (10 Jul 2022 20:16)  T(F): 98.3 (2022 16:02), Max: 98.5 (10 Jul 2022 20:16)  HR: 82 (2022 18:31) (72 - 93)  BP: 126/78 (2022 18:31) (121/74 - 161/72)  BP(mean): --  RR: 18 (2022 16:02) (16 - 18)  SpO2: 94% (2022 16:02) (93% - 98%)    Parameters below as of 2022 16:02  Patient On (Oxygen Delivery Method): room air    PHYSICAL EXAM:  The patient was alert and oriented X 3, well nourished, and in no apparent distress. Oropharynx showed no ulcerations. There was no visible lymphadenopathy. Conjunctiva were non-injected. There was no clubbing or edema of extremities. The scalp, hair, face, eyebrows, lips, oropharynx , neck, chest, back, buttocks, extremities X 4, hands, feet, nails were examined. There was no hyperhidrosis or bromhidrosis.   The following lesions are noted:  hypopigmented macules on b/l LE, some more well defined   thin brown plaques b/l inframammary folds      LABS:                        9.3    12.54 )-----------( 423      ( 2022 07:36 )             31.4     07-11    142  |  104  |  18  ----------------------------<  204<H>  3.9   |  28  |  0.67    Ca    9.0      2022 07:36  Phos  3.1     07-11  Mg     1.8     -    TPro  6.1  /  Alb  3.3  /  TBili  0.2  /  DBili  x   /  AST  24  /  ALT  37  /  AlkPhos  71  07-11    PTT - ( 10 Jul 2022 14:36 )  PTT:129.5 sec

## 2022-07-11 NOTE — PROGRESS NOTE ADULT - ASSESSMENT
71F s/p right foot navicular bone biopsy, wound debridement and heel I&D  - pt seen and evaluated  - Afebrile, WBC 12.54  - s/p right foot navicular bone biopsy, wound debridement and heel I&D, warm and viable, no pus, no SOI  - high concern residual bone infection, low concern vaibility  - OR data showing Proetus ESBL  - ID recs Vanco and Ertapenem via mediport for 6 weeks, appreciated  - Pt is stable from podiatry for d/c, f/u and wound care instructions listed in discharge note provider  - Discussed w/ attending

## 2022-07-11 NOTE — PROGRESS NOTE ADULT - SUBJECTIVE AND OBJECTIVE BOX
71 year old female with PMH DM, COPD (chronic asthma), history of Chronic Adrenal Insufficiency on Chronic prednisone history of colorectal cancer s/p resection (colostomy bag), Hx of CAD, Chronic A fib on Eliquis, bronchiectasis, and tracheomalacia and multiple intubations presenting with right foot wound concerning for OM s/p wound care. Course complicated by medication induced anaphylaxis 2/2 cefepime in setting of penicillin allergy. Further complicated by V-tach with pulse after epi administration, requiring lidocaine for . Patient was intubated and sedated for hypoxia and airway protection. Pt was successfully extubated, with improving mental status on nasal cannula. MRI showed osteomyelitis and developing abscess in R foot, with wound cultures showing MRSA and proteus mirabilis. Patient was continued on meropenem and vancomycin. Podiatry is following this patient, she is s/p right foot incision and drainage and resection of navicular bone  with Dr. Valenzuela. She was transferred to medicine floor on .     No ON events.  Pt denies foot pain, had discussion regarding pain medicine titration. She also has noticed a new, hypopigmented rash that is itchy. She denies fever, chills, abdominal pain, dysuria, n/v/d, constipation. CP, and shortness of breath. Right foot dressing is clean and dry with no drainage. Pt and daughter interested in [facility] for [facility] per conversation with CM.    REVIEW OF SYSTEMS:    CONSTITUTIONAL: No weakness, fevers or chills  EYES/ENT: No visual changes;  No vertigo or throat pain   NECK: No pain or stiffness  RESPIRATORY: No wheezing, hemoptysis, cough, no shortness of breath  CARDIOVASCULAR: No chest pain or palpitations  GASTROINTESTINAL: No abdominal or epigastric pain. No nausea, vomiting, or hematemesis; No diarrhea or constipation. No melena or hematochezia.  GENITOURINARY: No dysuria, frequency or hematuria  NEUROLOGICAL: No numbness or weakness  SKIN: +Itchy, diffuse hypopigmented rash, particularly thighs and forearms     MEDICATIONS  (STANDING):  albuterol/ipratropium for Nebulization 3 milliLiter(s) Nebulizer every 6 hours  apixaban 5 milliGRAM(s) Oral every 12 hours  atorvastatin 20 milliGRAM(s) Oral at bedtime  benzonatate 100 milliGRAM(s) Oral three times a day  chlorhexidine 2% Cloths 1 Application(s) Topical <User Schedule>  dextrose 5%. 1000 milliLiter(s) (100 mL/Hr) IV Continuous <Continuous>  dextrose 5%. 1000 milliLiter(s) (50 mL/Hr) IV Continuous <Continuous>  dextrose 50% Injectable 25 Gram(s) IV Push once  dextrose 50% Injectable 12.5 Gram(s) IV Push once  dextrose 50% Injectable 25 Gram(s) IV Push once  diltiazem    Tablet 60 milliGRAM(s) Oral two times a day  ertapenem  IVPB 1000 milliGRAM(s) IV Intermittent every 24 hours  famotidine Injectable 20 milliGRAM(s) IV Push daily  glucagon  Injectable 1 milliGRAM(s) IntraMuscular once  insulin glargine Injectable (LANTUS) 15 Unit(s) SubCutaneous at bedtime  insulin lispro (ADMELOG) corrective regimen sliding scale   SubCutaneous three times a day before meals  insulin lispro (ADMELOG) corrective regimen sliding scale   SubCutaneous at bedtime  insulin lispro Injectable (ADMELOG) 9 Unit(s) SubCutaneous three times a day before meals  lisinopril 5 milliGRAM(s) Oral daily  melatonin 3 milliGRAM(s) Oral at bedtime  methylPREDNISolone sodium succinate Injectable 8 milliGRAM(s) IV Push daily  polyethylene glycol 3350 17 Gram(s) Oral daily  senna 2 Tablet(s) Oral at bedtime  tiotropium 18 MICROgram(s) Capsule 1 Capsule(s) Inhalation daily  vancomycin  IVPB 1000 milliGRAM(s) IV Intermittent every 12 hours    MEDICATIONS  (PRN):  acetaminophen     Tablet .. 650 milliGRAM(s) Oral every 6 hours PRN Temp greater or equal to 38C (100.4F)  dextrose Oral Gel 15 Gram(s) Oral once PRN Blood Glucose LESS THAN 70 milliGRAM(s)/deciliter  oxycodone    5 mG/acetaminophen 325 mG 1 Tablet(s) Oral every 6 hours PRN Severe Pain (7 - 10)    Vital Signs Last 24 Hrs  T(C): 36.6 (2022 12:02), Max: 36.9 (10 Jul 2022 20:16)  T(F): 97.8 (2022 12:02), Max: 98.5 (10 Jul 2022 20:16)  HR: 74 (2022 12:02) (72 - 93)  BP: 146/62 (2022 12:02) (120/73 - 161/72)  RR: 16 (2022 12:02) (16 - 18)  SpO2: 94% (2022 12:02) (93% - 98%)    Parameters below as of 2022 12:02  Patient On (Oxygen Delivery Method): room air    GENERAL: NAD, lying in bed comfortably  HEAD:  Atraumatic, normocephalic  EYES: EOMI, PERRLA, conjunctiva and sclera clear. Mild exophthalmos bilaterally   ENT: Moist mucous membranes  NECK: Supple, no JVD  HEART: Regular rate and rhythm, no murmurs, rubs, or gallops  LUNGS: Unlabored respirations, clear lungs. No wheezing.   ABDOMEN: Soft, nontender, nondistended, +BS, ostomy site clean with no sign of infection   EXTREMITIES: Right foot wrapped in elastic bandage with no apparent bleeding or drainage. 2+ peripheral pulses bilaterally. No clubbing, cyanosis, or edema  NERVOUS SYSTEM:  A&Ox3, no focal deficits   SKIN: Diffuse, mildly hypopigmented, non-raised patches on upper and lower limbs bilaterally                           9.3    12.54 )-----------( 423      ( 2022 07:36 )             31.4   07-11    142  |  104  |  18  ----------------------------<  204<H>  3.9   |  28  |  0.67    Ca    9.0      2022 07:36  Phos  3.1     07-11  Mg     1.8     07-11    TPro  6.1  /  Alb  3.3  /  TBili  0.2  /  DBili  x   /  AST  24  /  ALT  37  /  AlkPhos  71  07-11    PTT - ( 10 Jul 2022 14:36 )  PTT:129.5 sec    CAPILLARY BLOOD GLUCOSE    POCT Blood Glucose.: 143 mg/dL (2022 11:44)  POCT Blood Glucose.: 221 mg/dL (2022 07:58)  POCT Blood Glucose.: 286 mg/dL (10 Jul 2022 22:04)  POCT Blood Glucose.: 229 mg/dL (10 Jul 2022 16:31)    MICROBIOLOGY:    Vancomycin Level, Trough: 13.7 ug/mL (22)    .Tissue Culture   22:   Rare Proteus mirabilis ESBL --    Few polymorphonuclear leukocytes seen per low power field  No organisms seen per oil power field    ET Tube ET Tube  22   Moderate Proteus mirabilis ESBL  Normal Respiratory Jessica present  --  Proteus mirabilis ESBL    .Blood Blood-Peripheral  22   No Growth Final     .Abscess R foot wound  22   Numerous Proteus mirabilis ESBL  Numerous Methicillin Resistant Staphylococcus aureus  Moderate Corynebacterium species d"  --  Proteus mirabilis ESBL  Methicillin resistant Staphylococcus aureus    Clean Catch Clean Catch (Midstream)  22   <10,000 CFU/mL Normal Urogenital Jessica  --  --    .Blood Blood-Peripheral  22   No Growth Final  --  --    RADIOLOGY & ADDITIONAL TESTS:     < from: MR Foot w/wo IV Cont, Right (22 @ 23:43) >    INTERPRETATION:  MR HINDFOOT/ANKLE WITHOUT AND WITH IV CONTRAST RIGHT    HISTORY:  Diabetic foot swelling. Concern for osteomyelitis. Wound.    TECHNIQUE:  Multiplanar MRI of the right hindfoot/ankle was performed   without and with 5.5cc cc administered Gadavist intravenous gadolinium   contrast using 10 sequences.    COMPARISON:  Right foot x-rays dated 2022 and MRI dated 2022    FINDINGS:    OSSEOUS STRUCTURES    Acute Fractures/Contusions:  None.    Chronic Fractures/Ossifications:  None.    Marrow:  Soft tissue wound is present along the medial margin of the   navicular with underlying marrow edema with mild T1 marrow replacement   and enhancement of the medial margin of the navicular concerning for   osteomyelitis.    Tarsal Coalition:  None.    LIGAMENTS    Talofibular/Calcaneofibular Ligaments:  Intact.    Tibiofibular/Syndesmotic Ligaments:  Intact.    Medial Deltoid Ligament Complex:  Intact.    Spring/Subtalar Ligaments:  Intact.    TENDONS    Posterior Tibialis/Medial Flexor Tendons:  Intact.    Peroneal Tendons:  Intact.    Extensor Tendons:  Intact.    ACHILLES TENDON  Intact.    PLANTAR FASCIA  Intact.    JOINTS    Tibiotalar Joint:  Preserved.    Subtalar Joints:  Preserved.    Intertarsal Joints:  Preserved.    Tarsometatarsal Joints:  Preserved.    SOFT TISSUES    Masses/Cysts:  None.    Musculature:  Intact.    Subcutaneous Tissues:  Small ill-defined fluid collection is present   within the plantar heel fat-pad suggesting an adventitial bursa versus   developing abscess measuring up to 1 cm in size near the calcaneal   tuberosity and plantar fascia. There is mild surrounding enhancement.   Mild subcutaneous edema is present.    NEUROVASCULAR STRUCTURES    Tarsal Tunnel:  Preserved.    IMPRESSION:  1. Soft tissue wound along the medial margin of the navicular with   changes concerning for underlying osteomyelitis of the navicular.  2. Small ill-defined subcutaneous fluid collection of the plantar heel   fat-pad that may reflect an adventitial bursa versus developing abscess   measuring up to 1.0 cm in size. Correlation is suggested for signs of   infection in this location.    --- End of Report ---      < end of copied text >

## 2022-07-11 NOTE — PROGRESS NOTE ADULT - ASSESSMENT
71 year old female with PMH DM, COPD (chronic asthma), history of Chronic Adrenal Insufficiency on Chronic prednisone history of colorectal cancer s/p resection (colostomy bag), Hx of CAD, Chronic A fib on Eliquis, and tracheomalacia and multiple intubations presenting with right foot wound concerning for OM s/p wound care. Hospital course complicated by medicine-induced anaphylaxis 2/2 cefepime and V-tach w/pulse after epi administration, and patient was intubated and sedated for hypoxia and airway protection. Patient has since been extubated and she is s/p right foot incision and drainage and wound debridement 7/6 with Dr. Valenzuela. This AM she is resting comfortably in bed, pleasant, and appropriate, with pain well controlled on Percocet x2 q6; OR cultures demonstrating Proteus mirabilis ESBL + MRSA; per podiatry recs is prepared for d/c w Abx administered through pt's Mediport, with 6w course of vanc and ertapenem required for tx of residual osteomyelitis.

## 2022-07-11 NOTE — DIETITIAN INITIAL EVALUATION ADULT - PERTINENT LABORATORY DATA
07-11    142  |  104  |  18  ----------------------------<  204<H>  3.9   |  28  |  0.67    Ca    9.0      11 Jul 2022 07:36  Phos  3.1     07-11  Mg     1.8     07-11    TPro  6.1  /  Alb  3.3  /  TBili  0.2  /  DBili  x   /  AST  24  /  ALT  37  /  AlkPhos  71  07-11    CAPILLARY BLOOD GLUCOSE  POCT Blood Glucose.: 143 mg/dL (11 Jul 2022 11:44)  POCT Blood Glucose.: 221 mg/dL (11 Jul 2022 07:58)  POCT Blood Glucose.: 286 mg/dL (10 Jul 2022 22:04)  POCT Blood Glucose.: 229 mg/dL (10 Jul 2022 16:31)    A1C with Estimated Average Glucose Result: 9.2 % (07-11-22 @ 07:36)  A1C with Estimated Average Glucose Result: 8.0 % (05-10-22 @ 12:26)  A1C with Estimated Average Glucose Result: 9.5 % (03-29-22 @ 13:50)

## 2022-07-11 NOTE — DIETITIAN INITIAL EVALUATION ADULT - PERTINENT MEDS FT
MEDICATIONS  (STANDING):  albuterol/ipratropium for Nebulization 3 milliLiter(s) Nebulizer every 6 hours  apixaban 5 milliGRAM(s) Oral every 12 hours  atorvastatin 20 milliGRAM(s) Oral at bedtime  benzonatate 100 milliGRAM(s) Oral three times a day  chlorhexidine 2% Cloths 1 Application(s) Topical <User Schedule>  dextrose 5%. 1000 milliLiter(s) (100 mL/Hr) IV Continuous <Continuous>  dextrose 5%. 1000 milliLiter(s) (50 mL/Hr) IV Continuous <Continuous>  dextrose 50% Injectable 25 Gram(s) IV Push once  dextrose 50% Injectable 12.5 Gram(s) IV Push once  dextrose 50% Injectable 25 Gram(s) IV Push once  diltiazem    Tablet 60 milliGRAM(s) Oral two times a day  ertapenem  IVPB 1000 milliGRAM(s) IV Intermittent every 24 hours  famotidine Injectable 20 milliGRAM(s) IV Push daily  glucagon  Injectable 1 milliGRAM(s) IntraMuscular once  insulin glargine Injectable (LANTUS) 15 Unit(s) SubCutaneous at bedtime  insulin lispro (ADMELOG) corrective regimen sliding scale   SubCutaneous three times a day before meals  insulin lispro (ADMELOG) corrective regimen sliding scale   SubCutaneous at bedtime  insulin lispro Injectable (ADMELOG) 9 Unit(s) SubCutaneous three times a day before meals  lisinopril 5 milliGRAM(s) Oral daily  melatonin 3 milliGRAM(s) Oral at bedtime  methylPREDNISolone sodium succinate Injectable 8 milliGRAM(s) IV Push daily  polyethylene glycol 3350 17 Gram(s) Oral daily  senna 2 Tablet(s) Oral at bedtime  tiotropium 18 MICROgram(s) Capsule 1 Capsule(s) Inhalation daily  vancomycin  IVPB 1000 milliGRAM(s) IV Intermittent every 12 hours    MEDICATIONS  (PRN):  acetaminophen     Tablet .. 650 milliGRAM(s) Oral every 6 hours PRN Temp greater or equal to 38C (100.4F)  dextrose Oral Gel 15 Gram(s) Oral once PRN Blood Glucose LESS THAN 70 milliGRAM(s)/deciliter  oxycodone    5 mG/acetaminophen 325 mG 1 Tablet(s) Oral every 6 hours PRN Severe Pain (7 - 10)

## 2022-07-11 NOTE — DIETITIAN INITIAL EVALUATION ADULT - NSFNSGIIOFT_GEN_A_CORE
Denies nausea, vomiting. +colostomy, states output has been normal. Pt currently on bowel regimen (miralax, senna).

## 2022-07-11 NOTE — PROGRESS NOTE ADULT - PROBLEM SELECTOR PLAN 7
- Sacral Stage 2 Pressure Injury measuring 2.5cm(l) x 1cm(w) red wound base w/ min serosang drainage without odor noted.  - Wound care recs  - Reevaluation placed

## 2022-07-11 NOTE — PROGRESS NOTE ADULT - PROBLEM SELECTOR PLAN 6
- Pt with nonproductive cough since pre-admission, reports is consistent with her "usual asthma", improving   - No subjective difficulty breathing, satting well on RA  - Duoneb 3 ml q6  - Benzonatate 100 mg TID for cough   - Incentive spirometry

## 2022-07-11 NOTE — PROGRESS NOTE ADULT - ASSESSMENT
71 f with DM, CAD, a-fib, asthma/COPD, Chronic Adrenal Insufficiency on steroids, history of colorectal cancer s/p resection and ostomy, tracheomalacia and multiple intubations, p/w R foot wound  febrile to 100.7, was given vanco and cefepime and after cefepime had a ?anaphylaxis with hypotension, tachycardia, swelling, hypoxia was intubated   WBC increased to 22, wound cx: MRSA, ESBL proteus and corynebacterium  foot MRI 7/3: Soft tissue wound with osteomyelitis of the navicular,  plantar heel abscess  s/p I&D and resection of navicular bone 7/6 but high suspicion for residual osteo    leukocytosis, fever, sepsis, diabetic foot infection, abscess and osteo, s/p I&D and resection of navicular bone 7/6 but high suspicion for residual osteo, OR cx with MRSA and ESBL proteus, the heel cx is negative  ?anaphylaxis to cefepime in ER  adrenal insufficiency on steroids     * c/w vanco 1 q 12 and ertapenem 1 qd  * monitor vanco trough and renal function to prevent nephrotoxicity  * will need a  6 week course of antibiotics until 8/17, pt has a port and going to rehab, if the rehab facility accepts to administer the antibiotics via port then no need for placing another line  * weekly CBC, CMP, vanco trough while on antibiotics      The above assessment and plan was discussed with the primary team    Michelle Rolon MD  contact on teams  After 5pm and on weekends call [phone]

## 2022-07-11 NOTE — CONSULT NOTE ADULT - ASSESSMENT
1. Intertrigo, inframammary folds- start ketoconazole 2% cream BID  2. hypopigmented macules- some c/w post-inflammatory hyperpigmentation, some with idiopathic guttate hypomelanosis.  -diagnoses reviewed with patient, no need for intervention  3. Xerosis- routine dry skin care reviewed  Patient is welcome to follow up with Dr. Garcia at our clinic located at [address] [place] [address] [address], [place] ([phone]).     The patient's chart was reviewed in addition to being seen and examined at bedside with the dermatology attending Dr. Garcia. Recommendations were communicated with the primary team.  Please page [phone] w/10 digit call back number for further related questions.    Natalya Glass MD  Resident Physician, PGY2  [facility]  Pager: [phone]  Office: [phone]

## 2022-07-11 NOTE — DIETITIAN INITIAL EVALUATION ADULT - ADD RECOMMEND
1) Continue Consistent Carbohydrate diet.  2) Continue Glucerna 3x/day. Recommend multivitamin and Vitamin C, pending no medical contraindications, to promote wound healing.   3) Monitor PO intake, GI tolerance, skin integrity, labs, weight, and bowel movement regularity.   4) Honor food preferences as feasible. Assist with meals PRN and encourage PO intake.  5) RD remains available upon request and will follow-up per protocol.

## 2022-07-11 NOTE — PROGRESS NOTE ADULT - REASON FOR ADMISSION
Anaphylaxis

## 2022-07-11 NOTE — PROGRESS NOTE ADULT - PROBLEM SELECTOR PLAN 10
- Atorvastatin 5mg
- S/p resection with colostomy   - Monitor ostomy output  - Ostomy care  - Pt denies constipation and bloating  - Bowel regimen
- S/p resection with colostomy   - Monitor ostomy output  - Ostomy care  - Pt denies constipation and bloating  - Bowel regimen
- Atorvastatin 5mg

## 2022-07-11 NOTE — DIETITIAN INITIAL EVALUATION ADULT - NUTRITIONGOAL OUTCOME2
Pt to achieve BG and A1c WNL by means of adherence to medication regimen, monitoring blood glucose, and therapeutic diet.

## 2022-07-11 NOTE — PROGRESS NOTE ADULT - PROBLEM SELECTOR PLAN 11
- Bowel regimen: Continue home senna, miralax, and lactulose  - Home eliquis 5mg BID restarted 7/10 as pt has no pending procedures   - Diet: Carb consistent  - PT/OT  - Dispo: [facility], bed secured and will be d/c 6PM today 7/11   - Pain management: Percocet PRN for severe pain, Tylenol PRN for moderate pain; will titrate pain regimen to 1  Percocet q6 in for d/c  - Blood draws through mediport  - Derm consulted for hypopigmented itchy non-raised lesions on arms and legs; however may not have time to f/u recs prior to d/c to [facility]

## 2022-07-11 NOTE — DIETITIAN INITIAL EVALUATION ADULT - OTHER INFO
Per chart, pt currently ordered for vancomycin, invanz, lantus, admelog SSI, admelog before meals, atorvastatin, and Solu-Medrol in-house.    Reports  lbs since she got diagnosed with cancer in 2018 (was previously 25 lbs heavier).  Dosing wt: 137.8 lbs (07-06)  Wt history per chart: 129.5 lbs (05-12), 130 lbs (03-31), 147 lbs (02-18), 140 lbs (01-27), 137 lbs (08/30/21), 146 lbs (07/03/21).  Indicates wt gain x 2 months. RD to continue to monitor weight trends as able/available.     Pt made aware RD remains available.

## 2022-07-11 NOTE — PROGRESS NOTE ADULT - PROBLEM SELECTOR PLAN 4
- Insulin dosing modified again due to elevated POCT glucose on 7/9  - 15 U Lantus for basal insulin and 9 U Admelog pre-meal x3  - Ensure that other IV medications are given in NS rather than dextrose   - 8 U NPH given 7/8n for pre-lunch fingerstick of >300  - Consistent carbohydrate diet + Glucerna per patient request   - Sliding scale insulin as needed  - Diabetes education  - Goal POCT 100-180  - A1c 9.2 this AM

## 2022-07-11 NOTE — PROGRESS NOTE ADULT - PROBLEM SELECTOR PROBLEM 11
Need for prophylactic measure
Need for prophylactic measure
History of colorectal cancer
History of colorectal cancer

## 2022-07-11 NOTE — DIETITIAN INITIAL EVALUATION ADULT - ENERGY INTAKE
Fair (50-75%) Pt reports fair PO intake in-house secondary to dislike of institutional foods. Requested the Glucerna shakes, is having them.   Pt was previously on tube feeds of Glucerna 1.2 60 ml/hr x 18 hours (7/3-7/5) in setting of sedation/intubation. Extubated 7/4.

## 2022-07-11 NOTE — DIETITIAN INITIAL EVALUATION ADULT - NSICDXPASTSURGICALHX_GEN_ALL_CORE_FT
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    H/O pelvic surgery 5 years ago - s/p fracture    H/O total knee replacement, bilateral 5 years ago    History of partial hysterectomy 30 years ago - fibroids    History of sinus surgery multiple sinus surgeries    History of tracheomalacia 2015 - attempted tracheal stenting ([facility])- course complicated by obstruction, respiratory failure, multiple CPR attempts -  stent discontinued; 10/20/2016 Tracheobronchoplasty (Prolene Mesh) performed at [facility] by Dr Zapien    Rectal bleeding exam under anesthesia ([facility]) 2/2018    S/P bronchoscopy 6/5/2018 - [facility] (Dr Zapien) no evidence of tracheobronchomalacia in trachea or bronchial tubes

## 2022-07-11 NOTE — DIETITIAN INITIAL EVALUATION ADULT - NSFNSADHERENCEPTAFT_GEN_A_CORE
Pt noted with hx of DM2. Reports taking insulin and lantus. Reports checking her blood glucose "every time I eat," about 3-4x/day, with typical range  mg/dl, sometimes >200 depending on what she eats/when she eats something "I know I shouldn't." Reports she isn't very strict with her blood glucose control. A1c 9.2% (07-11) indicates poor glycemic control.

## 2022-07-11 NOTE — DIETITIAN INITIAL EVALUATION ADULT - REASON
Nutrition-focused physical examination deferred at this time - pt reporting good PO intake PTA. No overt signs of fat/muscle wasting visually observed.

## 2022-07-12 ENCOUNTER — NON-APPOINTMENT (OUTPATIENT)
Age: 72
End: 2022-07-12

## 2022-07-12 ENCOUNTER — APPOINTMENT (OUTPATIENT)
Dept: WOUND CARE | Facility: CLINIC | Age: 72
End: 2022-07-12

## 2022-07-13 ENCOUNTER — APPOINTMENT (OUTPATIENT)
Dept: PULMONOLOGY | Facility: CLINIC | Age: 72
End: 2022-07-13

## 2022-07-13 RX ORDER — KETOCONAZOLE 20 MG/G
1 AEROSOL, FOAM TOPICAL
Qty: 1 | Refills: 0
Start: 2022-07-13 | End: 2022-08-11

## 2022-07-14 ENCOUNTER — APPOINTMENT (OUTPATIENT)
Dept: WOUND CARE | Facility: CLINIC | Age: 72
End: 2022-07-14

## 2022-07-15 ENCOUNTER — EMERGENCY (EMERGENCY)
Facility: HOSPITAL | Age: 72
LOS: 1 days | Discharge: HOME | End: 2022-07-15
Attending: EMERGENCY MEDICINE
Payer: MEDICARE

## 2022-07-15 VITALS
RESPIRATION RATE: 17 BRPM | OXYGEN SATURATION: 98 % | TEMPERATURE: 98 F | HEART RATE: 78 BPM | DIASTOLIC BLOOD PRESSURE: 77 MMHG | SYSTOLIC BLOOD PRESSURE: 109 MMHG

## 2022-07-15 VITALS
HEIGHT: 68 IN | TEMPERATURE: 99 F | OXYGEN SATURATION: 99 % | WEIGHT: 139.99 LBS | HEART RATE: 84 BPM | SYSTOLIC BLOOD PRESSURE: 123 MMHG | DIASTOLIC BLOOD PRESSURE: 76 MMHG | RESPIRATION RATE: 20 BRPM

## 2022-07-15 DIAGNOSIS — K62.5 HEMORRHAGE OF ANUS AND RECTUM: Chronic | ICD-10-CM

## 2022-07-15 DIAGNOSIS — Z98.89 OTHER SPECIFIED POSTPROCEDURAL STATES: Chronic | ICD-10-CM

## 2022-07-15 DIAGNOSIS — Z96.653 PRESENCE OF ARTIFICIAL KNEE JOINT, BILATERAL: Chronic | ICD-10-CM

## 2022-07-15 DIAGNOSIS — Z98.890 OTHER SPECIFIED POSTPROCEDURAL STATES: Chronic | ICD-10-CM

## 2022-07-15 DIAGNOSIS — Z87.09 PERSONAL HISTORY OF OTHER DISEASES OF THE RESPIRATORY SYSTEM: Chronic | ICD-10-CM

## 2022-07-15 DIAGNOSIS — H05.352 EXOSTOSIS OF LEFT ORBIT: Chronic | ICD-10-CM

## 2022-07-15 PROCEDURE — 99284 EMERGENCY DEPT VISIT MOD MDM: CPT

## 2022-07-15 PROCEDURE — 99283 EMERGENCY DEPT VISIT LOW MDM: CPT | Mod: GC

## 2022-07-15 NOTE — ED PROVIDER NOTE - NSFOLLOWUPINSTRUCTIONS_ED_ALL_ED_FT
Please continue with regular dressing changes, per podiatry.     Please return to emergency room if symptoms worsen.

## 2022-07-15 NOTE — CONSULT NOTE ADULT - ASSESSMENT
71F s/p right foot navicular bone biopsy, wound debridement and heel I&D with medial navicular wound   - pt seen and evaluated  - Afebrile  -  Lower Extremity Exam: right foot medial navicular wound to bone w resolving erythema periwound, scant sanguineous drainage, no malodor, no bogginess, no drainage noted, no tracking noted. No pertinent pedal deformities on the left foot at this time.   - Continue  Vanco and Ertapenem via mediport, appreciated  - Pt is stable from podiatry for d/c, f/u and wound care instructions listed in discharge note provider  - Discussed w/ attending

## 2022-07-15 NOTE — CONSULT NOTE ADULT - SUBJECTIVE AND OBJECTIVE BOX
Patient is a 71y old  Female who presents with a chief complaint of     HPI: 71y female presenting from facility with bleeding of R foot wound. Pt has chronic open wound on R foot, had debridement done last week and found to have osteomyelitis. Currently staying at [facility] and receiving daily dressing changes by nurse. Yesterday nurse noticed blood coming from open wound and recommended pt come in to ED to get it checked. Pt denies fever, chills, CP, SOB. All ROS negative.         PAST MEDICAL & SURGICAL HISTORY:  Atrial fibrillation  paroxysmal, on eliquis      Diabetes  Type 2      COPD (chronic obstructive pulmonary disease)      Adrenal insufficiency  Medrol daily for over 50 years      Aortic insufficiency  moderate AR on echo 5/3/2018      Pelvic fracture      Asthma      Tracheobronchomalacia  diagnosed 2015, s/p bronchial thermoplasty 2016 (Dr Zapien); recent bronchoscopy 6/5/2018 revealed no evidence of tracheobronchomalacia in trachea or bronchial tubes      Colorectal cancer  4/2018- last treatment , chemo and radiation      Rectal bleeding      Seizure  x 1 1/7/18      DVT (deep venous thrombosis)  15-20 years ago, took coumadin      TIA (transient ischemic attack)  multiple, last 5 years ago - presents as right-sided weakness      History of partial hysterectomy  30 years ago - fibroids      H/O total knee replacement, bilateral  5 years ago      History of sinus surgery  multiple sinus surgeries      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      H/O pelvic surgery  5 years ago - s/p fracture      History of tracheomalacia  2015 - attempted tracheal stenting ([facility])- course complicated by obstruction, respiratory failure, multiple CPR attempts -  stent discontinued; 10/20/2016 Tracheobronchoplasty (Prolene Mesh) performed at [facility] by Dr Zapien      S/P bronchoscopy  6/5/2018 - [place] (Dr Zapien) no evidence of tracheobronchomalacia in trachea or bronchial tubes      Rectal bleeding  exam under anesthesia ([facility]) 2/2018          MEDICATIONS  (STANDING):    MEDICATIONS  (PRN):      Allergies    aspirin (Short breath)  Avelox (Short breath; Pruritus)  cefepime (Anaphylaxis)  codeine (Short breath)  Dilaudid (Short breath)  iodine (Short breath; Swelling)  penicillin (Anaphylaxis)  shellfish (Anaphylaxis)  tetanus toxoid (Short breath)  Valium (Short breath)    Intolerances        VITALS:    Vital Signs Last 24 Hrs  T(C): 37 (15 Jul 2022 12:50), Max: 37 (15 Jul 2022 12:50)  T(F): 98.6 (15 Jul 2022 12:50), Max: 98.6 (15 Jul 2022 12:50)  HR: 84 (15 Jul 2022 12:50) (84 - 84)  BP: 123/76 (15 Jul 2022 12:50) (123/76 - 123/76)  BP(mean): --  RR: 20 (15 Jul 2022 12:50) (20 - 20)  SpO2: 99% (15 Jul 2022 12:50) (99% - 99%)    Parameters below as of 15 Jul 2022 12:50  Patient On (Oxygen Delivery Method): room air        LABS:                CAPILLARY BLOOD GLUCOSE              LOWER EXTREMITY PHYSICAL EXAM:  Vascular: DP/PT 2/4 B/L, CFT <3 seconds B/L, Temperature gradient warm to cool B/L  Neuro: Epicritic sensation intact to the level of midfoot B/L  Musculoskeletal/Ortho: unremarkable   Skin: right foot medial navicular wound to bone w resolving erythema periwound, scant sanguineous drainage, no malodor, no bogginess, no drainage noted, no tracking noted. No pertinent pedal deformities on the left foot at this time.     Etiology:     RADIOLOGY & ADDITIONAL STUDIES:

## 2022-07-15 NOTE — ED ADULT NURSE NOTE - NSICDXPASTSURGICALHX_GEN_ALL_CORE_FT
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    H/O pelvic surgery 5 years ago - s/p fracture    H/O total knee replacement, bilateral 5 years ago    History of partial hysterectomy 30 years ago - fibroids    History of sinus surgery multiple sinus surgeries    History of tracheomalacia 2015 - attempted tracheal stenting ([facility])- course complicated by obstruction, respiratory failure, multiple CPR attempts -  stent discontinued; 10/20/2016 Tracheobronchoplasty (Prolene Mesh) performed at [facility] by Dr Zapien    Rectal bleeding exam under anesthesia ([facility]) 2/2018    S/P bronchoscopy 6/5/2018 - [place] (Dr Zapien) no evidence of tracheobronchomalacia in trachea or bronchial tubes

## 2022-07-15 NOTE — DISCHARGE NOTE PROVIDER - NSDCMRMEDTOKEN_GEN_ALL_CORE_FT
Admelog 100 units/mL injectable solution: 9 unit(s) injectable 3 times a day (before meals)  apixaban 5 mg oral tablet: 1 tab(s) orally every 12 hours  ascorbic acid 500 mg oral tablet: 1 tab(s) orally once a day  budesonide-formoterol 160 mcg-4.5 mcg/inh inhalation aerosol: 2 puff(s) inhaled 2 times a day  Crestor 5 mg oral tablet: 1 tab(s) orally once a day (at bedtime)  dilTIAZem 60 mg oral tablet: 1 tab(s) orally 2 times a day  ertapenem 1 g injection: 1 gram(s) intravenous once a day  fluticasone 50 mcg/inh nasal spray: 1 spray(s) nasal 2 times a day  hydrOXYzine hydrochloride 25 mg oral tablet: 1 tab(s) orally 2 times a day, As needed, Itching x 7 days  insulin glargine 100 units/mL subcutaneous solution: 15 unit(s) subcutaneous once a day (at bedtime)  ipratropium-albuterol 0.5 mg-2.5 mg/3 mL inhalation solution: 3 milliliter(s) inhaled every 6 hours  ketoconazole 2% topical cream: Apply topically to affected area 2 times a day   lactulose 10 g/15 mL oral syrup: 22.5 milliliter(s) orally once a day  lisinopril 5 mg oral tablet: 1 tab(s) orally once a day  methylPREDNISolone 8 mg oral tablet: 1 tab(s) orally once a day  mexiletine 200 mg oral capsule: 1 cap(s) orally 3 times a day  Multiple Vitamins oral tablet: 1 tab(s) orally once a day  oxycodone-acetaminophen 5 mg-325 mg oral tablet: 1 tab(s) orally every 6 hours, As needed, Severe Pain (7 - 10)  pantoprazole 40 mg oral delayed release tablet: 1 tab(s) orally once a day (before a meal)  polyethylene glycol 3350 oral powder for reconstitution: 17 gram(s) orally 2 times a day  pregabalin 150 mg oral capsule: 1 cap(s) orally 2 times a day  senna oral tablet: 2 tab(s) orally once a day (at bedtime)  Spiriva 18 mcg inhalation capsule: 1 cap(s) inhaled once a day  vancomycin 1 g intravenous injection: 1 gram(s) intravenous every 12 hours  zinc sulfate 220 mg oral capsule: 1 cap(s) orally once a day

## 2022-07-15 NOTE — DISCHARGE NOTE PROVIDER - NSDCFUADDAPPT_GEN_ALL_CORE_FT
Podiatry Discharge Instructions:  Follow up: Please follow up with Dr. Anni Slaughter within 1 week of discharge from the hospital, please call [phone] for appointment and discuss that you recently were seen in the hospital.  Wound Care: Please leave your dressing clean dry intact until your follow up appointment   Weight bearing: Please weight bear as tolerated in a surgical shoe.  Antibiotics: Please continue as instructed.

## 2022-07-15 NOTE — ED PROVIDER NOTE - PATIENT PORTAL LINK FT
You can access the FollowMyHealth Patient Portal offered by [facility] by registering at the following website: [email or web address]. By joining [employer]’s FollowMyHealth portal, you will also be able to view your health information using other applications (apps) compatible with our system.

## 2022-07-15 NOTE — ED ADULT NURSE REASSESSMENT NOTE - NS ED NURSE REASSESS COMMENT FT1
Soiled diaper changed to clean diaper. Pt provided with food and beverage lunch tray, now eating. Aware of discharge and awaiting nonemergent ambulance service back to her facility.

## 2022-07-15 NOTE — ED CLERICAL - NS ED CLERK NOTE PRE-ARRIVAL INFORMATION; ADDITIONAL PRE-ARRIVAL INFORMATION
CC/Reason For referral: currently bleeding from wound in right foot, pt has MRSA.  Preferred Consultant(if applicable):  Who admits for you (if needed):  Do you have documents you would like to fax over?  Would you still like to speak to an ED attending? no

## 2022-07-15 NOTE — ED ADULT NURSE NOTE - OBJECTIVE STATEMENT
70 y/o Female presents to the ED via EMS from [facility] C/O bright red bleeding from R foot wound. Pt had R foot surgery last week. Wound care nurse from [facility] dressed wound and pt was recommended to come to ER. PMH DVT, DM, Afib, seizure, TIA, COPD, Asthma, colorectal cancer with colostomy bag. A&Ox3, airway patent with spontaneous unlabored breathing, equal B/L radial pulses. Wound care/ podiatry team assessed wound and performed wound care. Wound is re dressed with dry clean dressing. Pt states her wound is "granulating and bleeding due to pt taking Eliquis." Safety maintained, call bell in reach.

## 2022-07-15 NOTE — DISCHARGE NOTE PROVIDER - NSDCFUSCHEDAPPT_GEN_ALL_CORE_FT
[place]  [facility]  Med GenInt [address] [facility]  Scheduled Appointment: 07/26/2022    Genesis Aragon  [facility]  Cardio Electro [address]  Scheduled Appointment: 07/28/2022    Rico Glover  [facility]  Cardio [address]  Scheduled Appointment: 07/28/2022    Huma Gray  [facility]  WOUNDCARE [address]  Scheduled Appointment: 08/04/2022    Eulalio Allison  [facility]  PULMMED [address]  Scheduled Appointment: 08/10/2022

## 2022-07-15 NOTE — CONSULT NOTE ADULT - CONSULT REASON
71F s/p right foot navicular bone biopsy, wound debridement and heel I&D with medial navicular wound

## 2022-07-15 NOTE — ED ADULT NURSE NOTE - NSICDXPASTMEDICALHX_GEN_ALL_CORE_FT
7 PAST MEDICAL HISTORY:  Adrenal insufficiency Medrol daily for over 50 years    Aortic insufficiency moderate AR on echo 5/3/2018    Asthma     Atrial fibrillation paroxysmal, on eliquis    Colorectal cancer 4/2018- last treatment , chemo and radiation    COPD (chronic obstructive pulmonary disease)     Diabetes Type 2    DVT (deep venous thrombosis) 15-20 years ago, took coumadin    Pelvic fracture     Rectal bleeding     Seizure x 1 1/7/18    TIA (transient ischemic attack) multiple, last 5 years ago - presents as right-sided weakness    Tracheobronchomalacia diagnosed 2015, s/p bronchial thermoplasty 2016 (Dr Zapien); recent bronchoscopy 6/5/2018 revealed no evidence of tracheobronchomalacia in trachea or bronchial tubes

## 2022-07-15 NOTE — ED ADULT NURSE NOTE - NSIMPLEMENTINTERV_GEN_ALL_ED
Implemented All Fall with Harm Risk Interventions:  [place] to call system. Call bell, personal items and telephone within reach. Instruct patient to call for assistance. Room bathroom lighting operational. Non-slip footwear when patient is off stretcher. Physically safe environment: no spills, clutter or unnecessary equipment. Stretcher in lowest position, wheels locked, appropriate side rails in place. Provide visual cue, wrist band, yellow gown, etc. Monitor gait and stability. Monitor for mental status changes and reorient to person, place, and time. Review medications for side effects contributing to fall risk. Reinforce activity limits and safety measures with patient and family. Provide visual clues: red socks.

## 2022-07-15 NOTE — ED PROVIDER NOTE - OBJECTIVE STATEMENT
71y female presenting from facility with bleeding of R foot wound. Pt has chronic open wound on R foot, had debridement done last week and found to have osteomyelitis. Currently staying at [facility] and receiving daily dressing changes by nurse. Yesterday nurse noticed blood coming from open wound and recommended pt come in to ED to get it checked. Pt denies fever, chills, CP, SOB. All ROS negative.   Podiatry consulted - on exam the wound has very minimal blood on granulation tissue, with no active bleeding. Per podiatry, no concerns and pt ok for discharge.

## 2022-07-15 NOTE — ED PROVIDER NOTE - NSICDXPASTSURGICALHX_GEN_ALL_CORE_FT
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    H/O pelvic surgery 5 years ago - s/p fracture    H/O total knee replacement, bilateral 5 years ago    History of partial hysterectomy 30 years ago - fibroids    History of sinus surgery multiple sinus surgeries    History of tracheomalacia 2015 - attempted tracheal stenting ([facility])- course complicated by obstruction, respiratory failure, multiple CPR attempts -  stent discontinued; 10/20/2016 Tracheobronchoplasty (Prolene Mesh) performed at [facility] by Dr Zapien    Rectal bleeding exam under anesthesia ([facility]) 2/2018    S/P bronchoscopy 6/5/2018 - [place] (Dr Zapien) no evidence of tracheobronchomalacia in trachea or bronchial tubes

## 2022-07-15 NOTE — ED PROVIDER NOTE - ATTENDING CONTRIBUTION TO CARE
I, Riley Amezcua, performed a history and physical exam of the patient and discussed their management with the resident and /or advanced care provider. I reviewed the resident and /or ACP's note and agree with the documented findings and plan of care. I was present and available for all procedures.  Patient presents with chronic foot ulcer and known osteomyelitis being treated as outpatient. Patient had blood during wound dressing change today. Unknown how wound previously looked, but likely stable. Patient without fevers. Consulted podiatry for futher recs, but pt likely to continue abx and return to facility.

## 2022-07-26 ENCOUNTER — APPOINTMENT (OUTPATIENT)
Dept: INTERNAL MEDICINE | Facility: CLINIC | Age: 72
End: 2022-07-26

## 2022-07-28 ENCOUNTER — NON-APPOINTMENT (OUTPATIENT)
Age: 72
End: 2022-07-28

## 2022-07-28 ENCOUNTER — APPOINTMENT (OUTPATIENT)
Dept: CARDIOLOGY | Facility: CLINIC | Age: 72
End: 2022-07-28

## 2022-07-28 ENCOUNTER — APPOINTMENT (OUTPATIENT)
Dept: ELECTROPHYSIOLOGY | Facility: CLINIC | Age: 72
End: 2022-07-28

## 2022-07-28 VITALS
HEIGHT: 67 IN | DIASTOLIC BLOOD PRESSURE: 58 MMHG | BODY MASS INDEX: 19.93 KG/M2 | OXYGEN SATURATION: 96 % | SYSTOLIC BLOOD PRESSURE: 107 MMHG | HEART RATE: 68 BPM | TEMPERATURE: 96.8 F | WEIGHT: 127 LBS

## 2022-07-28 PROCEDURE — 93010 ELECTROCARDIOGRAM REPORT: CPT

## 2022-07-28 PROCEDURE — 99215 OFFICE O/P EST HI 40 MIN: CPT

## 2022-07-28 PROCEDURE — 99214 OFFICE O/P EST MOD 30 MIN: CPT

## 2022-07-28 PROCEDURE — 93000 ELECTROCARDIOGRAM COMPLETE: CPT

## 2022-07-28 RX ORDER — SULFAMETHOXAZOLE AND TRIMETHOPRIM 800; 160 MG/1; MG/1
800-160 TABLET ORAL
Qty: 14 | Refills: 0 | Status: DISCONTINUED | COMMUNITY
Start: 2022-02-14 | End: 2022-07-28

## 2022-07-28 RX ORDER — FORMOTEROL FUMARATE DIHYDRATE 20 UG/2ML
SOLUTION RESPIRATORY (INHALATION)
Refills: 0 | Status: DISCONTINUED | COMMUNITY
End: 2022-07-28

## 2022-07-28 RX ORDER — BENZONATATE 100 MG/1
100 CAPSULE ORAL
Qty: 84 | Refills: 0 | Status: DISCONTINUED | COMMUNITY
Start: 2021-12-06 | End: 2022-07-28

## 2022-07-28 RX ORDER — TRAMADOL HYDROCHLORIDE 50 MG/1
50 TABLET, COATED ORAL
Qty: 30 | Refills: 0 | Status: DISCONTINUED | COMMUNITY
Start: 2022-06-29

## 2022-07-28 RX ORDER — SULFAMETHOXAZOLE AND TRIMETHOPRIM 800; 160 MG/1; MG/1
800-160 TABLET ORAL TWICE DAILY
Qty: 20 | Refills: 0 | Status: DISCONTINUED | COMMUNITY
Start: 2021-12-28 | End: 2022-07-28

## 2022-07-28 RX ORDER — FLUTICASONE FUROATE AND VILANTEROL TRIFENATATE 200; 25 UG/1; UG/1
200-25 POWDER RESPIRATORY (INHALATION) DAILY
Qty: 3 | Refills: 1 | Status: DISCONTINUED | COMMUNITY
Start: 2021-03-10 | End: 2022-07-28

## 2022-07-28 RX ORDER — LACTULOSE 10 G/15ML
10 SOLUTION ORAL DAILY
Qty: 1 | Refills: 0 | Status: DISCONTINUED | COMMUNITY
Start: 2022-01-05 | End: 2022-07-28

## 2022-07-28 RX ORDER — DOXYCYCLINE HYCLATE 100 MG/1
100 CAPSULE ORAL
Qty: 14 | Refills: 0 | Status: DISCONTINUED | COMMUNITY
Start: 2021-10-08 | End: 2022-07-28

## 2022-07-28 RX ORDER — ROFLUMILAST 250 UG/1
250 TABLET ORAL
Qty: 90 | Refills: 1 | Status: DISCONTINUED | COMMUNITY
Start: 2021-03-10 | End: 2022-07-28

## 2022-07-28 RX ORDER — LEVOFLOXACIN 500 MG/1
500 TABLET, FILM COATED ORAL DAILY
Qty: 7 | Refills: 0 | Status: DISCONTINUED | COMMUNITY
Start: 2021-09-21 | End: 2022-07-28

## 2022-07-28 RX ORDER — CEFDINIR 300 MG/1
300 CAPSULE ORAL
Qty: 20 | Refills: 1 | Status: DISCONTINUED | COMMUNITY
Start: 2019-10-15 | End: 2022-07-28

## 2022-07-28 RX ORDER — DILTIAZEM HYDROCHLORIDE 60 MG/1
60 TABLET ORAL
Qty: 180 | Refills: 3 | Status: DISCONTINUED | COMMUNITY
Start: 2021-03-12 | End: 2022-07-28

## 2022-07-28 RX ORDER — POLYETHYLENE GLYCOL 3350 AND ELECTROLYTES WITH LEMON FLAVOR 236; 22.74; 6.74; 5.86; 2.97 G/4L; G/4L; G/4L; G/4L; G/4L
236 POWDER, FOR SOLUTION ORAL
Qty: 4000 | Refills: 0 | Status: DISCONTINUED | COMMUNITY
Start: 2021-08-31 | End: 2022-07-28

## 2022-07-28 RX ORDER — METHYLPREDNISOLONE 8 MG/1
8 TABLET ORAL
Qty: 100 | Refills: 1 | Status: DISCONTINUED | COMMUNITY
Start: 2022-03-03 | End: 2022-07-28

## 2022-07-29 NOTE — CARDIOLOGY SUMMARY
[de-identified] : 11/2021: CONCLUSIONS:\par 1. Mitral annular calcification, otherwise normal mitral\par valve. Minimal mitral regurgitation.\par 2. Calcified trileaflet aortic valve with normal opening.\par Moderate-severe aortic regurgitation.\par 3. Mild left atrial enlargement.\par 4. Normal left ventricular internal dimensions and wall\par thicknesses.\par 5. Endocardium not well visualized; grossly normal left\par ventricular systolic function.\par 6. Mild diastolic dysfunction (Stage I).\par 7. The right ventricle is not well visualized; grossly\par normal right ventricular systolic function.\par 8. Normal tricuspid valve. Minimal tricuspid regurgitation.\par 9. Estimated pulmonary artery systolic pressure equals 26\par mm Hg, assuming right atrial pressure equals 10  mm Hg,\par consistent with normal pulmonary pressures.\par *** Compared with echocardiogram of 4/23/2021, no\par significant changes noted.

## 2022-07-29 NOTE — DISCUSSION/SUMMARY
[EKG obtained to assist in diagnosis and management of assessed problem(s)] : EKG obtained to assist in diagnosis and management of assessed problem(s) [FreeTextEntry1] : Impression:\par \par 1. PVCs: EKG performed today to assess for presence of conduction disease and reveals NSR. Has long standing history of PVCs, suppressed with mexiletine use. Now off and feeling palpitation similar to PVCs in past. Recommend restarting mexiletine 150mg BID for improved PVC suppression. Last ECHO 2021 with normal LVEF. \par \par 2. HTN: resume oral antihypertensives as prescribed. Encouraged heart healthy diet, sodium restriction, and weight loss. Continue regular f/u with Cardiologist for further HTN management.\par \par 3. Paroxysmal afib: no recent afib noted. Remains on Eliquis for thromboembolic prophylaxis. \par \par  Resume regular f/u with Cardiologist and may RTO as needed or if any new or worsening symptoms occur.

## 2022-08-04 ENCOUNTER — APPOINTMENT (OUTPATIENT)
Dept: WOUND CARE | Facility: CLINIC | Age: 72
End: 2022-08-04

## 2022-08-04 VITALS
DIASTOLIC BLOOD PRESSURE: 66 MMHG | WEIGHT: 140 LBS | HEART RATE: 98 BPM | TEMPERATURE: 97.5 F | BODY MASS INDEX: 21.97 KG/M2 | HEIGHT: 67 IN | SYSTOLIC BLOOD PRESSURE: 158 MMHG

## 2022-08-04 DIAGNOSIS — L98.9 DISORDER OF THE SKIN AND SUBCUTANEOUS TISSUE, UNSPECIFIED: ICD-10-CM

## 2022-08-04 DIAGNOSIS — T66.XXXD RADIATION SICKNESS, UNSPECIFIED, SUBSEQUENT ENCOUNTER: ICD-10-CM

## 2022-08-04 PROCEDURE — 99213 OFFICE O/P EST LOW 20 MIN: CPT | Mod: 25

## 2022-08-04 PROCEDURE — 11402 EXC TR-EXT B9+MARG 1.1-2 CM: CPT

## 2022-08-04 NOTE — HISTORY OF PRESENT ILLNESS
[FreeTextEntry1] : 71 F R medial foot wound. Pt was admitted to [facility] back in March for acute repirtatory distress. \par Pt developed pressure wound after being bed bound.\par  Pt was recently discharged from rehab, where nurses have been taking care of it.\par  Pt denies N/V/F/Chills. \par new keratosis on chest wall, wants to remove it\par

## 2022-08-04 NOTE — PLAN
[FreeTextEntry1] : 8/4/22\par 71 yr old woman present with sacral wound \par mole on right chestwall lesion removed in office \par

## 2022-08-04 NOTE — PHYSICAL EXAM
[2+] : left 2+ [Please See PDF for [employer]] : Please See PDF for [employer]. [de-identified] : indication right chest wall\par analgesia lido sub cut\par procedure excision of seb keratosis\par findings 1cm lesion\par post op minmal bleed\par dressing 3 nylon steristrip\par other\par

## 2022-08-04 NOTE — ASSESSMENT
[FreeTextEntry1] : 71 F R medial foot wound to SubQ\par  justyn applied to sacrum, offload\par fup for suture removal\par \par - medial arch foot wound to subQ, tracking proximally, mild timi wound erythema, no pus\par  as per podiatrist\par - RX Gabapentin 300 mg to be taken at bed time for pain control \par - RX topical oxygen \par - RX diabetic shoes, to be assessed by orthotist Jadiel Singh\par - Ordered R foot MRI to rule out deep abscess\par - pt taking tramadol for pain\par - rec pain management for aspirin control\par - VNS orders given to pt , dress wound with Mupirocin and DSD 3X a week \par - RTC 2 week\par

## 2022-08-10 ENCOUNTER — APPOINTMENT (OUTPATIENT)
Dept: PULMONOLOGY | Facility: CLINIC | Age: 72
End: 2022-08-10

## 2022-08-10 VITALS
HEIGHT: 67.5 IN | WEIGHT: 149 LBS | RESPIRATION RATE: 16 BRPM | OXYGEN SATURATION: 96 % | SYSTOLIC BLOOD PRESSURE: 140 MMHG | DIASTOLIC BLOOD PRESSURE: 80 MMHG | BODY MASS INDEX: 23.11 KG/M2 | HEART RATE: 85 BPM | TEMPERATURE: 98 F

## 2022-08-10 DIAGNOSIS — F41.9 ANXIETY DISORDER, UNSPECIFIED: ICD-10-CM

## 2022-08-10 PROCEDURE — 95012 NITRIC OXIDE EXP GAS DETER: CPT

## 2022-08-10 PROCEDURE — 94729 DIFFUSING CAPACITY: CPT

## 2022-08-10 PROCEDURE — 94727 GAS DIL/WSHOT DETER LNG VOL: CPT

## 2022-08-10 PROCEDURE — 94010 BREATHING CAPACITY TEST: CPT

## 2022-08-10 PROCEDURE — 99214 OFFICE O/P EST MOD 30 MIN: CPT | Mod: 25

## 2022-08-10 NOTE — HISTORY OF PRESENT ILLNESS
[FreeTextEntry1] : Ms. Bloom is a 71 year old female with a history of abnormal CXR/chest CT, allergic rhinitis, severe persistent asthma, bronchiectasis, GERD, COPD, recurrent PNA, PND, s/p tracheoplasty, TBM, and SOB presenting to the office today for a follow up visit. Her chief complaint is\par - s/p ICU admission [facility] \par - she notes sleeping without pillow\par - she notes being SOB \par - she denies difficulty swallowing\par - she notes bowels are regular \par - she denies any visual issues\par - she notes sense of taste is off \par - she notes it might be due to antibiotics \par - she notes not taking dupixent \par - she notes coughing constantly \par - she notes not using the vest \par - She denies any headaches, nausea, vomiting, fever, chills, sweats, chest pain, chest pressure, palpitations, fatigue, diarrhea, constipation, dysphagia, myalgias, dizziness, leg swelling, leg pain, itchy eyes, itchy ears, heartburn, reflux, or sour taste in the mouth

## 2022-08-10 NOTE — PROCEDURE
[FreeTextEntry1] : Full PFT- spi reveals mild restriction, sever obstruction; FEV1 was 1.01L which is 46% of predicted; moderate low lung volumes; mil low diffusion at 12.4, which is 63% of predicted; normal flow volume loop\par \par FENO was 6; a normal value being less than 25\par Fractional exhaled nitric oxide (FENO) is regarded as a simple, noninvasive method for assessing eosinophilic airway inflammation. Produced by a variety of cells within the lung, nitric oxide (NO) concentrations are generally low in healthy individuals. However, high concentrations of NO appear to be involved in nonspecific host defense mechanisms and chronic inflammatory diseases such as asthma. The American Thoracic Society (ATS) therefore has recommended using FENO to aid in the diagnosis and monitoring of eosinophilic airway inflammation and asthma, and for identifying steroid responsive individuals whose chronic respiratory symptoms may be caused by airway inflammation.

## 2022-08-10 NOTE — ASSESSMENT
[FreeTextEntry1] : Ms. Bloom is a 71 year old female with a history of mm, rectal CA, AVDz, asthma, allergy, GERD, TBM, s/p multiple pneumonias, who presents- mild sob/ mucous production c/w active asthma (still); right foot infection\par \par Her chronic SOB which is multifactorial due to:\par - tracheomalacia (s/p tracheoplasty with residual frequent mucus production, though patient is non-compliant with vest therapy)\par - chronic bronchitis\par - asthma\par - allergic rhinitis\par - GERD\par - poor breathing mechanics \par - CAD/AV disease, arrhythmia, electrolyte issue\par \par problem 1a: severe persistent asthma -(steroid dependent) - active \par -continue Medrol 8mg/day \par -continue to use albuterol via the nebulizer QID \par -followed by Mucomyst QiD\par -followed by the acapella device/ chest vest therapy \par -(1st) followed by Perforomist via the nebulizer BID\par -(2nd) followed by Budesonide 0.5% via the nebulizer BID \par -continue to use Breo Ellipta 200 at 1 inhalation QD \par -continue to use Spiriva 1 inhalation QD\par -failed Xolair 225 injection; follow up injections every 2 weeks - Dupixent initiated (12.3.19) -off since 3/2022 - Initiate Tezspire 8/2022\par -continue to use Accolate 20 mg BID\par -Information sheet given about prednisone to the patient to be reviewed, this medication is never to be used without consulting the prescribing physician. Proper dietary restraint is necessary specifically salt containing foods, if any reaction may occur should be reported. \par -Asthma is believed to be caused by inherited (genetic) and environmental factor, but its exact cause is unknown. Asthma may be triggered by allergens, lung infections, or irritants in the air. Asthma triggers are different for each person\par -Inhaler technique reviewed as well as oral hygiene techniques reviewed with patient. Avoidance of cold air, extremes of temperature, rescue inhaler should be used before exercise. Order of medication reviewed with patient. Recommended use of a cool mist humidifier in the bedroom. \par \par problem 2: tracheomalacia, residual bronchomalacia \par -s/p tracheoplasty with Dr. Mt Zapien\par -laser Bronchoscopy pending\par -continue to follow up \par -s/p f/u Dynamic chest CT 10/2019 positive w TBM - repeat PRN\par \par problem 3: chronic bronchitis and mucus clearance\par -continue to use acapella device multiple times daily\par -recommended to use chest vest therapy multiple times daily \par -she is being sent for sputum cultures \par Patient has a chronic cough greater than 6 months, tried and failed manual chest physiotherapy at home, no skilled caregiver available at home to perform manual CPT, tried and failed acapella vibratory physiotherapy, and recommended chest vest therapy \par \par Problem 3A: Multiple infections ?Active (1/31/2022) \par -off Tobramycin BID for 1 month (completed 12/20/19)\par -Complete follow up Sputum culture after completing ABx if needed. (resend) \par \par Problem 3B: multi myeloma\par -appointment  on 1/28/2020\par \par problem 4: GERD\par -continue to use Protonix 40 mg before breakfast\par -continue Baclofen 10 mg q-meal\par -Rule of 2s: avoid eating too much, eating too late, eating too spicy, eating two hours before bed\par -Things to avoid including overeating, spicy foods, tight clothing, eating within three hours of bed, this list is not all inclusive. \par -For treatment of reflux, possible options discussed including diet control, H2 blockers, PPIs, as well as coating motility agents discussed as treatment options. Timing of meals and proximity of last meal to sleep were discussed. If symptoms persist, a formal gastrointestinal evaluation is needed. \par \par problem 5: allergic rhinitis \par -continue to use nasal saline\par -continue to use Xyzal 5 mg before bed\par -Environmental measures for allergies were encouraged including mattress and pillow cover, air purifier, and environmental controls. \par \par problem 6: hx of abnormal aortic valve / cardiac health - arrhythmia \par -continue to follow up with Dr. Leahy, AV Disease, AF\par -echocardiogram in June 2018  (Tosin); Kale Tristan for f/u, Ismail\par \par problem 7: colon cancer\par -s/p radiation therapy, surgery \par -continue to use chemotherapy follow up with Dr. King or Dr. Mario\par \par problem 8: poor breathing mechanics\par -Recommended Wim Hof and Buteyko breathing techniques \par -Proper breathing techniques were reviewed with an emphasis of exhalation. Patient instructed to breath in for 1 second and out for four seconds. Patient was encouraged to not talk while walking.\par \par problem 9: immunodeficiency\par - Due to the fact that this pt has had more infections than would be expected and immunological blood work is indicated this would include: IgG subclasses, quantitative immunoglobulins, Strep pneumoniae titers as well as Vitamin D levels. Based on this blood work we will be able to decide where the pt needs additional pneumococcal vaccine either Prevnar 13 or pneumovax. Immunology evaluation will also be potentially indicated.\par \par problem 10: r/o immunodeficiency (on Medrol)\par -Due to the fact that this pt has had more infections than would be expected and immunological blood work is indicated this would include: IgG subclasses, quantitative immunoglobulins, Strep pneumoniae titers as well as Vitamin D levels.\par -Based on this blood work we will be able to decide where the pt needs additional pneumococcal vaccine either Prevnar 13 or pneumovax. Immunology evaluation will also be potentially indicated. \par \par problem 11: abnormal chest CT- ? new nodule- likely inflammation \par - F/u PET/CT 4/2019- if changed Bx (Rupali); follow up and re-evaluate as per Rupali\par -questionable DIEUDONNE or HERMELINDO, all sputum is negative \par -CAT scans are the only radiological modality to identify abnormalities w/in the lungs with regards to nodules/masses/lymph nodes. Risks, benefits were reviewed in detail. The guidelines for abnormalities include follow up CT scans at various intervals which could range from 6 weeks to 1 year intervals. If there is a change for the worse then consideration for a biopsy will be considered if you are a candidate. Second opinion evaluation with a thoracic surgeon or an interventional radiologist could be offered. 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 It sometimes develops following a URI, herpes zoster outbreak in pharynx or thyroid or cervical spine injury. However, many patients have no identifiable antecedent explanation. \par \par Problem 14: Health Maintenance/COVID19 Precautions \par -s/p  Moderna COVID 19 vaccine x 3\par - Clean your hands often. Wash your hands often with soap and water for at least 20 seconds, especially after blowing your nose, coughing, or sneezing, or having been in a public place.\par - If soap and water are not available, use a hand  that contains at least 60% alcohol.\par - To the extent possible, avoid touching high-touch surfaces in public places - elevator buttons, door handles, handrails, handshaking with people, etc. Use a tissue or your sleeve to cover your hand or finger if you must touch something.\par - Wash your hands after touching surfaces in public places.\par - Avoid touching your face, nose, eyes, etc.\par - Clean and disinfect your home to remove germs: practice routine cleaning of frequently touched surfaces (for example: tables, doorknobs, light switches, handles, desks, toilets, faucets, sinks & cell phones)\par - Avoid crowds, especially in poorly ventilated spaces. Your risk of exposure to respiratory viruses like COVID-19 may increase in crowded, closed-in settings with little air circulation if there are people in the crowd who are sick. All patients are recommended to practice social distancing and stay at least 6 feet away from others. \par - Avoid all non-essential travel including plane trips, and especially avoid embarking on cruise ships.\par -If COVID-19 is spreading in your community, take extra measures to put distance between yourself and other people to further reduce your risk of being exposed to this new virus.\par -Stay home as much as possible.\par - Consider ways of getting food brought to your house through family, social, or commercial networks\par -Be aware that the virus has been known to live in the air up to 3 hours post exposure, cardboard up to 24 hours post exposure, copper up to 4 hours post exposure, steel and plastic up to 2-3 days post exposure. Risk of transmission from these surfaces are affected by many variables.\par COVID-19 precautionary Immune Support Recommendations:\par -OTC Vitamin C 500mg BID \par -OTC Quercetin 250-500mg BID \par -OTC Zinc 75-100mg per day \par -OTC Melatonin 1 or 2mg a night \par -OTC Vitamin D 1-4000mg per day \par -OTC Tonic Water 8oz per day\par -Water 0.5-1 gallon per day\par Asthma and COVID19:\par You need to make sure your asthma is under control. This often requires the use of inhaled corticosteroids (and sometimes oral corticosteroids). Inhaled corticosteroids do not likely reduce your immune system’s ability to fight infections, but oral corticosteroids may. It is important to use the steps above to protect yourself to limit your exposure to any respiratory virus. \par \par problem 15:  health maintenance \par -s/p flu shot 10/30/2020\par -6/2021 Shingeles: Valacyclovir in progress \par -recommended strep pneumonia vaccines: Prevnar-13 vaccine, followed by Pneumo vaccine 23 one year following\par -recommended early intervention for URIs\par -recommended regular osteoporosis evaluations\par -recommended early dermatological evaluations\par -recommended after the age of 50 to the age of 70, colonoscopy every 5 years \par \par F/U in 6 weeks - SPI / NIOX\par She is encouraged to call with any changes, concerns, or questions.

## 2022-08-10 NOTE — ADDENDUM
[FreeTextEntry1] : Documented by Andrew Padilla acting as a scribe for Dr. Eulalio Allison on (08/10/2022).\par \par All medical record record entries made by the Scribe were at my, Dr. Eulalio Allison's, direction and personally dictated by me on (08/10/2022). I have reviewed the chart and agree that the record accurately reflects my personal performance of the history, physical exam, assessment and plan. I have personally directed, reviewed, and agree with the discharge instructions.

## 2022-08-10 NOTE — PHYSICAL EXAM
[No Acute Distress] : no acute distress [Normal Oropharynx] : normal oropharynx [Normal Appearance] : normal appearance [No Neck Mass] : no neck mass [Normal Rate/Rhythm] : normal rate/rhythm [Normal S1, S2] : normal s1, s2 [No Resp Distress] : no resp distress [Clear to Auscultation Bilaterally] : clear to auscultation bilaterally [No Abnormalities] : no abnormalities [Benign] : benign [Normal Gait] : normal gait [No Clubbing] : no clubbing [No Cyanosis] : no cyanosis [No Edema] : no edema [FROM] : FROM [Normal Color/ Pigmentation] : normal color/ pigmentation [No Focal Deficits] : no focal deficits [Oriented x3] : oriented x3 [Normal Affect] : normal affect [III] : Mallampati Class: III [Murmur ___ / 6] : murmur [unfilled] / 6 [TextBox_54] : 2/6 systolic murmur  [TextBox_68] : I:E 1:3; mild and forced expiratory wheezes [TextBox_105] : Right leg LE wound

## 2022-08-17 NOTE — ED CDU PROVIDER SUBSEQUENT DAY NOTE - ATTENDING CONTRIBUTION TO CARE
I have personally performed a face to face diagnostic evaluation on this patient.  I have reviewed the ACP note and agree with the history, exam, and plan of care, except as noted.  History and Exam by me shows  Attn - pt feeling better. no dizziness.  Pt c/o blurred vision - Acuity 20/20 with hand held card.  MRI/MRA normal.  Neurology to reevaluate pt.  Pt stable for D/C pending neuro reeval. Hydroxyzine Counseling: Patient advised that the medication is sedating and not to drive a car after taking this medication.  Patient informed of potential adverse effects including but not limited to dry mouth, urinary retention, and blurry vision.  The patient verbalized understanding of the proper use and possible adverse effects of hydroxyzine.  All of the patient's questions and concerns were addressed.

## 2022-08-18 ENCOUNTER — APPOINTMENT (OUTPATIENT)
Dept: WOUND CARE | Facility: CLINIC | Age: 72
End: 2022-08-18

## 2022-08-18 VITALS — TEMPERATURE: 97.4 F

## 2022-08-18 DIAGNOSIS — T66.XXXA RADIATION SICKNESS, UNSPECIFIED, INITIAL ENCOUNTER: ICD-10-CM

## 2022-08-18 DIAGNOSIS — S31.000D UNSPECIFIED OPEN WOUND OF LOWER BACK AND PELVIS W/OUT PENETRATION INTO RETROPERITONEUM, SUBSEQUENT ENCOUNTER: ICD-10-CM

## 2022-08-18 DIAGNOSIS — C90.00 MULTIPLE MYELOMA NOT HAVING ACHIEVED REMISSION: ICD-10-CM

## 2022-08-18 DIAGNOSIS — L98.9 DISORDER OF THE SKIN AND SUBCUTANEOUS TISSUE, UNSPECIFIED: ICD-10-CM

## 2022-08-18 LAB — CORE LAB BIOPSY: NORMAL

## 2022-08-18 PROCEDURE — 99213 OFFICE O/P EST LOW 20 MIN: CPT

## 2022-08-22 ENCOUNTER — APPOINTMENT (OUTPATIENT)
Dept: INTERNAL MEDICINE | Facility: CLINIC | Age: 72
End: 2022-08-22

## 2022-08-29 ENCOUNTER — APPOINTMENT (OUTPATIENT)
Dept: PULMONOLOGY | Facility: CLINIC | Age: 72
End: 2022-08-29

## 2022-08-29 VITALS
BODY MASS INDEX: 25.66 KG/M2 | HEIGHT: 65 IN | OXYGEN SATURATION: 97 % | SYSTOLIC BLOOD PRESSURE: 130 MMHG | HEART RATE: 93 BPM | DIASTOLIC BLOOD PRESSURE: 70 MMHG | TEMPERATURE: 98.3 F | RESPIRATION RATE: 16 BRPM | WEIGHT: 154 LBS

## 2022-08-29 PROCEDURE — 96372 THER/PROPH/DIAG INJ SC/IM: CPT

## 2022-08-29 RX ORDER — TEZEPELUMAB-EKKO 210 MG/1.9ML
210 INJECTION, SOLUTION SUBCUTANEOUS
Qty: 0 | Refills: 0 | Status: COMPLETED | OUTPATIENT
Start: 2022-08-28

## 2022-08-30 ENCOUNTER — APPOINTMENT (OUTPATIENT)
Dept: VASCULAR SURGERY | Facility: CLINIC | Age: 72
End: 2022-08-30

## 2022-08-30 VITALS
TEMPERATURE: 96.9 F | HEART RATE: 86 BPM | BODY MASS INDEX: 24.33 KG/M2 | SYSTOLIC BLOOD PRESSURE: 119 MMHG | HEIGHT: 67 IN | WEIGHT: 155 LBS | DIASTOLIC BLOOD PRESSURE: 68 MMHG

## 2022-08-30 PROCEDURE — 99203 OFFICE O/P NEW LOW 30 MIN: CPT

## 2022-08-30 RX ORDER — BUDESONIDE AND FORMOTEROL FUMARATE DIHYDRATE 160; 4.5 UG/1; UG/1
160-4.5 AEROSOL RESPIRATORY (INHALATION)
Refills: 0 | Status: DISCONTINUED | COMMUNITY
Start: 2022-07-28 | End: 2022-08-30

## 2022-08-30 RX ORDER — MUPIROCIN 20 MG/G
2 OINTMENT TOPICAL
Qty: 1 | Refills: 2 | Status: DISCONTINUED | COMMUNITY
Start: 2021-03-16 | End: 2022-08-30

## 2022-08-30 RX ORDER — GABAPENTIN 300 MG/1
300 CAPSULE ORAL
Qty: 14 | Refills: 0 | Status: DISCONTINUED | COMMUNITY
Start: 2022-06-30 | End: 2022-08-30

## 2022-08-30 RX ORDER — NYSTATIN 100000 [USP'U]/ML
100000 SUSPENSION ORAL
Qty: 1 | Refills: 2 | Status: DISCONTINUED | COMMUNITY
Start: 2022-02-18 | End: 2022-08-30

## 2022-08-30 RX ORDER — LEVOFLOXACIN 500 MG/1
500 TABLET, FILM COATED ORAL DAILY
Qty: 10 | Refills: 0 | Status: DISCONTINUED | COMMUNITY
Start: 2022-06-30 | End: 2022-08-30

## 2022-08-30 RX ORDER — PANTOPRAZOLE 40 MG/1
40 TABLET, DELAYED RELEASE ORAL DAILY
Qty: 1 | Refills: 1 | Status: DISCONTINUED | COMMUNITY
Start: 2017-05-13 | End: 2022-08-30

## 2022-08-30 RX ORDER — PREDNISONE 20 MG/1
20 TABLET ORAL
Qty: 9 | Refills: 1 | Status: DISCONTINUED | COMMUNITY
Start: 2019-08-22 | End: 2022-08-30

## 2022-08-30 RX ORDER — GUAIFENESIN 400 MG/1
400 TABLET ORAL
Qty: 1 | Refills: 0 | Status: DISCONTINUED | COMMUNITY
Start: 2022-01-11 | End: 2022-08-30

## 2022-08-31 PROBLEM — C90.00: Status: ACTIVE | Noted: 2020-11-10

## 2022-08-31 PROBLEM — T66.XXXA ADVERSE EFFECT OF RADIATION, INITIAL ENCOUNTER: Status: ACTIVE | Noted: 2019-07-02

## 2022-08-31 PROBLEM — S31.000D SACRAL WOUND, SUBSEQUENT ENCOUNTER: Status: ACTIVE | Noted: 2019-05-03

## 2022-08-31 PROBLEM — L98.9 SKIN EROSION: Status: ACTIVE | Noted: 2021-03-16

## 2022-08-31 NOTE — ASSESSMENT
[FreeTextEntry1] : RAYRAY RODRIGUEZ is a 71 year old female presents for evaluation\par \par > Left foot wounds\par – Patient with palpable dorsalis pedis pulses bilaterally.  With normal pulse volume recordings performed May 2022.  At this time, no indication for vascular surgery intervention.\par – Recommend continued local wound care with close follow-up with podiatry.  If there is evidence of continued poor wound healing, patient may need an angiogram in the future.\par – Recommend follow-up with infectious disease as patient has been on long-term antibiotics for treatment of osteomyelitis.  Will provide ID office number.\par – Follow-up in 3 months or sooner if needed.

## 2022-08-31 NOTE — HISTORY OF PRESENT ILLNESS
[FreeTextEntry1] : 71 F R medial foot wound. Pt was admitted to [facility] back in March for acute repirtatory distress. \par Pt developed pressure wound after being bed bound.\par  Pt was recently discharged from rehab, where nurses have been taking care of it.\par  Pt denies N/V/F/Chills. \par new keratosis on chest wall, wants to remove it\par frustrated its still open\par biopsy is a verruca from chest wall\par \par

## 2022-08-31 NOTE — PHYSICAL EXAM
[0] : left 0 [2+] : left 2+ [Calm] : calm [de-identified] : Well-appearing  [de-identified] : EOMI, anicteric [de-identified] : Motor and sensory intact in all 4 extremities [de-identified] : 1.5 cm ulceration on the medial dorsal aspect of right foot.  There is healed calluses over the medial ankle x2. [de-identified] : A&Ox4

## 2022-08-31 NOTE — PLAN
[FreeTextEntry1] :  \par 71 yr old woman present with sacral wound \par mole on right chestwall lesion removed in office - verruca benign, cn use siacilic acid\par

## 2022-08-31 NOTE — PHYSICAL EXAM
[2+] : left 2+ [Please See PDF for [employer]] : Please See PDF for [employer]. [de-identified] : indication right chest wall\par analgesia lido sub cut\par procedure excision of seb keratosis\par findings 1cm lesion\par post op minmal bleed\par dressing 3 nylon steristrip\par other\par

## 2022-08-31 NOTE — DATA REVIEWED
[de-identified] : MRA from September 8, 2021 was reviewed and did not show any significant abnormalities.
[FreeTextEntry1] : Reviewed ODILON/PVRs performed May 13, 2022.  Right ODILON is 1.45, left ODILON cannot be obtained due to noncompressible vessels.  Right TBI 0.78 in the left TBI is 1.19.  Although this is a limited study due to vessel noncompressibility, pulse volume recordings demonstrate normal amplitudes at all levels without evidence of significant arterial occlusive disease in either lower extremity at rest.

## 2022-08-31 NOTE — CONSULT LETTER

## 2022-09-01 NOTE — PROGRESS NOTE ADULT - SUBJECTIVE AND OBJECTIVE BOX
Interval History:   On telemetry, NSR with frequent PVCs correlating to symptoms of SOB and dyspnea   Was started on mexilitine 200mg PO BID with some occasional GI discomfort    MEDICATIONS  (STANDING):  apixaban 5 milliGRAM(s) Oral two times a day  atorvastatin 20 milliGRAM(s) Oral at bedtime  budesonide  80 MICROgram(s)/formoterol 4.5 MICROgram(s) Inhaler 2 Puff(s) Inhalation two times a day  calamine/zinc oxide Lotion 1 Application(s) Topical daily  chlorhexidine 2% Cloths 1 Application(s) Topical daily  dextrose 40% Gel 15 Gram(s) Oral once  dextrose 5%. 1000 milliLiter(s) (50 mL/Hr) IV Continuous <Continuous>  dextrose 5%. 1000 milliLiter(s) (100 mL/Hr) IV Continuous <Continuous>  dextrose 50% Injectable 25 Gram(s) IV Push once  dextrose 50% Injectable 12.5 Gram(s) IV Push once  dextrose 50% Injectable 25 Gram(s) IV Push once  diltiazem    Tablet 30 milliGRAM(s) Oral every 12 hours  fluticasone propionate 50 MICROgram(s)/spray Nasal Spray 1 Spray(s) Both Nostrils two times a day  glucagon  Injectable 1 milliGRAM(s) IntraMuscular once  insulin glargine Injectable (LANTUS) 15 Unit(s) SubCutaneous at bedtime  insulin lispro (ADMELOG) corrective regimen sliding scale   SubCutaneous three times a day before meals  methylPREDNISolone 4 milliGRAM(s) Oral daily  mexiletine 200 milliGRAM(s) Oral two times a day  pantoprazole    Tablet 40 milliGRAM(s) Oral before breakfast  polyethylene glycol 3350 17 Gram(s) Oral daily  roflumilast 250 MICROGram(s) Oral daily  tiotropium 18 MICROgram(s) Capsule 1 Capsule(s) Inhalation daily    MEDICATIONS  (PRN):  ALBUTerol    90 MICROgram(s) HFA Inhaler 2 Puff(s) Inhalation every 6 hours PRN Shortness of Breath and/or Wheezing  aluminum hydroxide/magnesium hydroxide/simethicone Suspension 30 milliLiter(s) Oral every 4 hours PRN Dyspepsia  bisacodyl 5 milliGRAM(s) Oral daily PRN Constipation  guaiFENesin   Syrup  (Sugar-Free) 100 milliGRAM(s) Oral every 6 hours PRN Cough  ondansetron Injectable 4 milliGRAM(s) IV Push every 6 hours PRN Nausea and/or Vomiting    Vital Signs Last 24 Hrs  T(C): 36.8 (26 Apr 2021 09:52), Max: 37.1 (25 Apr 2021 18:13)  T(F): 98.3 (26 Apr 2021 09:52), Max: 98.8 (25 Apr 2021 21:05)  HR: 67 (26 Apr 2021 09:52) (57 - 74)  BP: 167/67 (26 Apr 2021 09:52) (117/50 - 167/67)  BP(mean): --  RR: 18 (26 Apr 2021 10:20) (16 - 20)  SpO2: 100% (26 Apr 2021 10:20) (97% - 100%)    Appearance: Normal	  HEENT:   Normal oral mucosa, PERRL, EOMI	  Lymphatic: No lymphadenopathy  Cardiovascular: Normal S1 S2, No JVD, No murmurs, No edema  Respiratory: Lungs clear to auscultation	  Psychiatry: A & O x 3, Mood & affect appropriate  Gastrointestinal:  Soft, Non-tender, + BS	  Skin: No rashes, No ecchymoses, No cyanosis	  Neurologic: Non-focal  Extremities: Normal range of motion, No clubbing, cyanosis or edema  Vascular: Peripheral pulses palpable 2+ bilaterally    LABS:	 	    CBC Full  -  ( 26 Apr 2021 07:29 )  WBC Count : 6.58 K/uL  Hemoglobin : 11.0 g/dL  Hematocrit : 35.9 %  Platelet Count - Automated : 282 K/uL  Mean Cell Volume : 74.8 fL  Mean Cell Hemoglobin : 22.9 pg  Mean Cell Hemoglobin Concentration : 30.6 gm/dL  Auto Neutrophil # : x  Auto Lymphocyte # : x  Auto Monocyte # : x  Auto Eosinophil # : x  Auto Basophil # : x  Auto Neutrophil % : x  Auto Lymphocyte % : x  Auto Monocyte % : x  Auto Eosinophil % : x  Auto Basophil % : x    04-26    142  |  106  |  17  ----------------------------<  90  4.1   |  27  |  0.94  04-25    141  |  105  |  17  ----------------------------<  98  4.1   |  25  |  0.86    Ca    8.9      26 Apr 2021 07:29  Ca    8.9      25 Apr 2021 07:25  Phos  3.6     04-26  Phos  3.6     04-25  Mg     2.0     04-26  Mg     2.1     04-25             Per Dr Devon Escobedo he was not able to send these electronically to the pharmacy today as his access was not working   He will try again in the morning

## 2022-09-02 NOTE — ED PROVIDER NOTE - NSICDXPASTMEDICALHX_GEN_ALL_CORE_FT
[Mid-back] : mid-back [Gradual] : gradual [4] : 4 [Dull/Aching] : dull/aching [Constant] : constant [Nothing helps with pain getting better] : Nothing helps with pain getting better [de-identified] : Has pain in back by lower scapula. No injury. Has been working with a chiroprator. Pain does not radiate.  [] : no [FreeTextEntry1] : R [FreeTextEntry5] : PT has pain in his mid back around his scapula.   PAST MEDICAL HISTORY:  Adrenal insufficiency Medrol daily for over 50 years    Aortic insufficiency moderate AR on echo 5/3/2018    Asthma     Atrial fibrillation paroxysmal, on eliquis    Colorectal cancer 4/2018- last treatment , chemo and radiation    COPD (chronic obstructive pulmonary disease)     Diabetes Type 2    DVT (deep venous thrombosis) 15-20 years ago, took coumadin    Pelvic fracture     Rectal bleeding     Seizure x 1 1/7/18    TIA (transient ischemic attack) multiple, last 5 years ago - presents as right-sided weakness    Tracheobronchomalacia diagnosed 2015, s/p bronchial thermoplasty 2016 (Dr Zapien); recent bronchoscopy 6/5/2018 revealed no evidence of tracheobronchomalacia in trachea or bronchial tubes

## 2022-09-06 ENCOUNTER — RESULT REVIEW (OUTPATIENT)
Age: 72
End: 2022-09-06

## 2022-09-06 ENCOUNTER — INPATIENT (INPATIENT)
Facility: HOSPITAL | Age: 72
LOS: 76 days | Discharge: NURSING, REHAB OR LONG-TERM CARE | DRG: 3 | End: 2022-11-22
Attending: THORACIC SURGERY (CARDIOTHORACIC VASCULAR SURGERY) | Admitting: THORACIC SURGERY (CARDIOTHORACIC VASCULAR SURGERY)
Payer: MEDICARE

## 2022-09-06 ENCOUNTER — APPOINTMENT (OUTPATIENT)
Dept: CARDIOTHORACIC SURGERY | Facility: HOSPITAL | Age: 72
End: 2022-09-06

## 2022-09-06 VITALS
HEART RATE: 69 BPM | RESPIRATION RATE: 15 BRPM | WEIGHT: 147.71 LBS | HEIGHT: 67 IN | OXYGEN SATURATION: 100 % | TEMPERATURE: 98 F

## 2022-09-06 DIAGNOSIS — Z98.89 OTHER SPECIFIED POSTPROCEDURAL STATES: Chronic | ICD-10-CM

## 2022-09-06 DIAGNOSIS — K62.5 HEMORRHAGE OF ANUS AND RECTUM: Chronic | ICD-10-CM

## 2022-09-06 DIAGNOSIS — H05.352 EXOSTOSIS OF LEFT ORBIT: Chronic | ICD-10-CM

## 2022-09-06 DIAGNOSIS — I71.01 DISSECTION OF THORACIC AORTA: ICD-10-CM

## 2022-09-06 DIAGNOSIS — Z87.09 PERSONAL HISTORY OF OTHER DISEASES OF THE RESPIRATORY SYSTEM: Chronic | ICD-10-CM

## 2022-09-06 DIAGNOSIS — Z98.890 OTHER SPECIFIED POSTPROCEDURAL STATES: Chronic | ICD-10-CM

## 2022-09-06 DIAGNOSIS — Z96.653 PRESENCE OF ARTIFICIAL KNEE JOINT, BILATERAL: Chronic | ICD-10-CM

## 2022-09-06 LAB
ALBUMIN SERPL ELPH-MCNC: 3.7 G/DL — SIGNIFICANT CHANGE UP (ref 3.3–5)
ALP SERPL-CCNC: 85 U/L — SIGNIFICANT CHANGE UP (ref 40–120)
ALT FLD-CCNC: 20 U/L — SIGNIFICANT CHANGE UP (ref 10–45)
ANION GAP SERPL CALC-SCNC: 11 MMOL/L — SIGNIFICANT CHANGE UP (ref 5–17)
ANISOCYTOSIS BLD QL: SIGNIFICANT CHANGE UP
APTT BLD: 28.8 SEC — SIGNIFICANT CHANGE UP (ref 27.5–35.5)
AST SERPL-CCNC: 15 U/L — SIGNIFICANT CHANGE UP (ref 10–40)
BASE EXCESS BLDV CALC-SCNC: -0.7 MMOL/L — SIGNIFICANT CHANGE UP (ref -2–3)
BASE EXCESS BLDV CALC-SCNC: -0.9 MMOL/L — SIGNIFICANT CHANGE UP (ref -2–3)
BASE EXCESS BLDV CALC-SCNC: -4.4 MMOL/L — LOW (ref -2–3)
BASE EXCESS BLDV CALC-SCNC: 0.7 MMOL/L — SIGNIFICANT CHANGE UP (ref -2–3)
BASE EXCESS BLDV CALC-SCNC: 1.2 MMOL/L — SIGNIFICANT CHANGE UP (ref -2–3)
BASE EXCESS BLDV CALC-SCNC: 1.4 MMOL/L — SIGNIFICANT CHANGE UP (ref -2–3)
BASE EXCESS BLDV CALC-SCNC: 1.5 MMOL/L — SIGNIFICANT CHANGE UP (ref -2–3)
BASE EXCESS BLDV CALC-SCNC: 1.7 MMOL/L — SIGNIFICANT CHANGE UP (ref -2–3)
BASE EXCESS BLDV CALC-SCNC: 3.8 MMOL/L — HIGH (ref -2–3)
BASOPHILS # BLD AUTO: 0 K/UL — SIGNIFICANT CHANGE UP (ref 0–0.2)
BASOPHILS NFR BLD AUTO: 0 % — SIGNIFICANT CHANGE UP (ref 0–2)
BILIRUB SERPL-MCNC: 0.2 MG/DL — SIGNIFICANT CHANGE UP (ref 0.2–1.2)
BLOOD GAS VENOUS - CREATININE: SIGNIFICANT CHANGE UP MG/DL (ref 0.5–1.3)
BLOOD GAS VENOUS - CREATININE: SIGNIFICANT CHANGE UP MG/DL (ref 0.5–1.3)
BUN SERPL-MCNC: 23 MG/DL — SIGNIFICANT CHANGE UP (ref 7–23)
CA-I SERPL-SCNC: 0.87 MMOL/L — LOW (ref 1.15–1.33)
CA-I SERPL-SCNC: 0.87 MMOL/L — LOW (ref 1.15–1.33)
CA-I SERPL-SCNC: 0.89 MMOL/L — LOW (ref 1.15–1.33)
CA-I SERPL-SCNC: 0.99 MMOL/L — LOW (ref 1.15–1.33)
CA-I SERPL-SCNC: 1.02 MMOL/L — LOW (ref 1.15–1.33)
CA-I SERPL-SCNC: 1.03 MMOL/L — LOW (ref 1.15–1.33)
CA-I SERPL-SCNC: 1.14 MMOL/L — LOW (ref 1.15–1.33)
CALCIUM SERPL-MCNC: 8.1 MG/DL — LOW (ref 8.4–10.5)
CHLORIDE BLDV-SCNC: 106 MMOL/L — SIGNIFICANT CHANGE UP (ref 96–108)
CHLORIDE BLDV-SCNC: 107 MMOL/L — SIGNIFICANT CHANGE UP (ref 96–108)
CHLORIDE SERPL-SCNC: 106 MMOL/L — SIGNIFICANT CHANGE UP (ref 96–108)
CO2 BLDV-SCNC: 25 MMOL/L — SIGNIFICANT CHANGE UP (ref 22–26)
CO2 BLDV-SCNC: 27 MMOL/L — HIGH (ref 22–26)
CO2 BLDV-SCNC: 28 MMOL/L — HIGH (ref 22–26)
CO2 BLDV-SCNC: 29 MMOL/L — HIGH (ref 22–26)
CO2 BLDV-SCNC: 29 MMOL/L — HIGH (ref 22–26)
CO2 BLDV-SCNC: 30 MMOL/L — HIGH (ref 22–26)
CO2 SERPL-SCNC: 25 MMOL/L — SIGNIFICANT CHANGE UP (ref 22–31)
CREAT SERPL-MCNC: 0.71 MG/DL — SIGNIFICANT CHANGE UP (ref 0.5–1.3)
DACRYOCYTES BLD QL SMEAR: SLIGHT — SIGNIFICANT CHANGE UP
EGFR: 90 ML/MIN/1.73M2 — SIGNIFICANT CHANGE UP
ELLIPTOCYTES BLD QL SMEAR: SLIGHT — SIGNIFICANT CHANGE UP
EOSINOPHIL # BLD AUTO: 0 K/UL — SIGNIFICANT CHANGE UP (ref 0–0.5)
EOSINOPHIL NFR BLD AUTO: 0 % — SIGNIFICANT CHANGE UP (ref 0–6)
GAS PNL BLDA: SIGNIFICANT CHANGE UP
GAS PNL BLDV: 138 MMOL/L — SIGNIFICANT CHANGE UP (ref 136–145)
GAS PNL BLDV: 138 MMOL/L — SIGNIFICANT CHANGE UP (ref 136–145)
GAS PNL BLDV: 139 MMOL/L — SIGNIFICANT CHANGE UP (ref 136–145)
GAS PNL BLDV: 140 MMOL/L — SIGNIFICANT CHANGE UP (ref 136–145)
GAS PNL BLDV: 141 MMOL/L — SIGNIFICANT CHANGE UP (ref 136–145)
GAS PNL BLDV: 141 MMOL/L — SIGNIFICANT CHANGE UP (ref 136–145)
GAS PNL BLDV: 142 MMOL/L — SIGNIFICANT CHANGE UP (ref 136–145)
GAS PNL BLDV: 143 MMOL/L — SIGNIFICANT CHANGE UP (ref 136–145)
GAS PNL BLDV: 143 MMOL/L — SIGNIFICANT CHANGE UP (ref 136–145)
GAS PNL BLDV: SIGNIFICANT CHANGE UP
GLUCOSE BLDV-MCNC: 108 MG/DL — HIGH (ref 70–99)
GLUCOSE BLDV-MCNC: 144 MG/DL — HIGH (ref 70–99)
GLUCOSE BLDV-MCNC: 161 MG/DL — HIGH (ref 70–99)
GLUCOSE BLDV-MCNC: 190 MG/DL — HIGH (ref 70–99)
GLUCOSE BLDV-MCNC: 211 MG/DL — HIGH (ref 70–99)
GLUCOSE BLDV-MCNC: 75 MG/DL — SIGNIFICANT CHANGE UP (ref 70–99)
GLUCOSE BLDV-MCNC: 77 MG/DL — SIGNIFICANT CHANGE UP (ref 70–99)
GLUCOSE BLDV-MCNC: 79 MG/DL — SIGNIFICANT CHANGE UP (ref 70–99)
GLUCOSE BLDV-MCNC: 92 MG/DL — SIGNIFICANT CHANGE UP (ref 70–99)
GLUCOSE SERPL-MCNC: 204 MG/DL — HIGH (ref 70–99)
HCO3 BLDV-SCNC: 23 MMOL/L — SIGNIFICANT CHANGE UP (ref 22–29)
HCO3 BLDV-SCNC: 26 MMOL/L — SIGNIFICANT CHANGE UP (ref 22–29)
HCO3 BLDV-SCNC: 27 MMOL/L — SIGNIFICANT CHANGE UP (ref 22–29)
HCO3 BLDV-SCNC: 27 MMOL/L — SIGNIFICANT CHANGE UP (ref 22–29)
HCO3 BLDV-SCNC: 28 MMOL/L — SIGNIFICANT CHANGE UP (ref 22–29)
HCO3 BLDV-SCNC: 29 MMOL/L — SIGNIFICANT CHANGE UP (ref 22–29)
HCT VFR BLD CALC: 27.9 % — LOW (ref 34.5–45)
HCT VFR BLDA CALC: 18 % — CRITICAL LOW (ref 34.5–46.5)
HCT VFR BLDA CALC: 22 % — LOW (ref 34.5–46.5)
HCT VFR BLDA CALC: 23 % — LOW (ref 34.5–46.5)
HCT VFR BLDA CALC: 23 % — LOW (ref 34.5–46.5)
HCT VFR BLDA CALC: 24 % — LOW (ref 34.5–46.5)
HCT VFR BLDA CALC: 25 % — LOW (ref 34.5–46.5)
HEPARINASE TEG R TIME: 16 MIN (ref 4.3–8.3)
HEPARINASE TEG R TIME: 6.6 MIN — SIGNIFICANT CHANGE UP (ref 4.3–8.3)
HGB BLD CALC-MCNC: 6.1 G/DL — CRITICAL LOW (ref 11.7–16.1)
HGB BLD CALC-MCNC: 7.4 G/DL — LOW (ref 11.7–16.1)
HGB BLD CALC-MCNC: 7.5 G/DL — LOW (ref 11.7–16.1)
HGB BLD CALC-MCNC: 7.6 G/DL — LOW (ref 11.7–16.1)
HGB BLD CALC-MCNC: 8 G/DL — LOW (ref 11.7–16.1)
HGB BLD CALC-MCNC: 8.1 G/DL — LOW (ref 11.7–16.1)
HGB BLD CALC-MCNC: 8.2 G/DL — LOW (ref 11.7–16.1)
HGB BLD-MCNC: 8.1 G/DL — LOW (ref 11.5–15.5)
HOROWITZ INDEX BLDV+IHG-RTO: SIGNIFICANT CHANGE UP
HYPOCHROMIA BLD QL: SIGNIFICANT CHANGE UP
INR BLD: 1.09 RATIO — SIGNIFICANT CHANGE UP (ref 0.88–1.16)
LACTATE BLDV-MCNC: 1.2 MMOL/L — SIGNIFICANT CHANGE UP (ref 0.5–2)
LACTATE BLDV-MCNC: 1.2 MMOL/L — SIGNIFICANT CHANGE UP (ref 0.5–2)
LACTATE BLDV-MCNC: 1.3 MMOL/L — SIGNIFICANT CHANGE UP (ref 0.5–2)
LACTATE BLDV-MCNC: 1.8 MMOL/L — SIGNIFICANT CHANGE UP (ref 0.5–2)
LACTATE BLDV-MCNC: 2.1 MMOL/L — HIGH (ref 0.5–2)
LACTATE BLDV-MCNC: 2.7 MMOL/L — HIGH (ref 0.5–2)
LACTATE BLDV-MCNC: 3.3 MMOL/L — HIGH (ref 0.5–2)
LACTATE BLDV-MCNC: 3.3 MMOL/L — HIGH (ref 0.5–2)
LACTATE BLDV-MCNC: 3.5 MMOL/L — HIGH (ref 0.5–2)
LYMPHOCYTES # BLD AUTO: 1.02 K/UL — SIGNIFICANT CHANGE UP (ref 1–3.3)
LYMPHOCYTES # BLD AUTO: 8.7 % — LOW (ref 13–44)
MACROCYTES BLD QL: SLIGHT — SIGNIFICANT CHANGE UP
MANUAL SMEAR VERIFICATION: SIGNIFICANT CHANGE UP
MCHC RBC-ENTMCNC: 18.9 PG — LOW (ref 27–34)
MCHC RBC-ENTMCNC: 29 GM/DL — LOW (ref 32–36)
MCV RBC AUTO: 65 FL — LOW (ref 80–100)
MICROCYTES BLD QL: SIGNIFICANT CHANGE UP
MONOCYTES # BLD AUTO: 0.3 K/UL — SIGNIFICANT CHANGE UP (ref 0–0.9)
MONOCYTES NFR BLD AUTO: 2.6 % — SIGNIFICANT CHANGE UP (ref 2–14)
NEUTROPHILS # BLD AUTO: 10.37 K/UL — HIGH (ref 1.8–7.4)
NEUTROPHILS NFR BLD AUTO: 88.7 % — HIGH (ref 43–77)
NT-PROBNP SERPL-SCNC: 120 PG/ML — SIGNIFICANT CHANGE UP (ref 0–300)
OVALOCYTES BLD QL SMEAR: SLIGHT — SIGNIFICANT CHANGE UP
PCO2 BLDV: 39 MMHG — SIGNIFICANT CHANGE UP (ref 39–42)
PCO2 BLDV: 41 MMHG — SIGNIFICANT CHANGE UP (ref 39–42)
PCO2 BLDV: 47 MMHG — HIGH (ref 39–42)
PCO2 BLDV: 47 MMHG — HIGH (ref 39–42)
PCO2 BLDV: 51 MMHG — HIGH (ref 39–42)
PCO2 BLDV: 52 MMHG — HIGH (ref 39–42)
PCO2 BLDV: 56 MMHG — HIGH (ref 39–42)
PH BLDV: 7.22 — LOW (ref 7.32–7.43)
PH BLDV: 7.3 — LOW (ref 7.32–7.43)
PH BLDV: 7.31 — LOW (ref 7.32–7.43)
PH BLDV: 7.33 — SIGNIFICANT CHANGE UP (ref 7.32–7.43)
PH BLDV: 7.34 — SIGNIFICANT CHANGE UP (ref 7.32–7.43)
PH BLDV: 7.37 — SIGNIFICANT CHANGE UP (ref 7.32–7.43)
PH BLDV: 7.4 — SIGNIFICANT CHANGE UP (ref 7.32–7.43)
PH BLDV: 7.41 — SIGNIFICANT CHANGE UP (ref 7.32–7.43)
PH BLDV: 7.43 — SIGNIFICANT CHANGE UP (ref 7.32–7.43)
PLAT MORPH BLD: NORMAL — SIGNIFICANT CHANGE UP
PLATELET # BLD AUTO: 298 K/UL — SIGNIFICANT CHANGE UP (ref 150–400)
PO2 BLDV: 133 MMHG — HIGH (ref 25–45)
PO2 BLDV: 173 MMHG — HIGH (ref 25–45)
PO2 BLDV: 39 MMHG — SIGNIFICANT CHANGE UP (ref 25–45)
PO2 BLDV: 54 MMHG — HIGH (ref 25–45)
PO2 BLDV: 57 MMHG — HIGH (ref 25–45)
PO2 BLDV: 58 MMHG — HIGH (ref 25–45)
PO2 BLDV: 59 MMHG — HIGH (ref 25–45)
PO2 BLDV: 66 MMHG — HIGH (ref 25–45)
PO2 BLDV: 73 MMHG — HIGH (ref 25–45)
POLYCHROMASIA BLD QL SMEAR: SLIGHT — SIGNIFICANT CHANGE UP
POTASSIUM BLDV-SCNC: 3.4 MMOL/L — LOW (ref 3.5–5.1)
POTASSIUM BLDV-SCNC: 3.5 MMOL/L — SIGNIFICANT CHANGE UP (ref 3.5–5.1)
POTASSIUM BLDV-SCNC: 4.4 MMOL/L — SIGNIFICANT CHANGE UP (ref 3.5–5.1)
POTASSIUM BLDV-SCNC: 4.8 MMOL/L — SIGNIFICANT CHANGE UP (ref 3.5–5.1)
POTASSIUM BLDV-SCNC: 5 MMOL/L — SIGNIFICANT CHANGE UP (ref 3.5–5.1)
POTASSIUM BLDV-SCNC: 5 MMOL/L — SIGNIFICANT CHANGE UP (ref 3.5–5.1)
POTASSIUM BLDV-SCNC: 6.5 MMOL/L — CRITICAL HIGH (ref 3.5–5.1)
POTASSIUM SERPL-MCNC: 4 MMOL/L — SIGNIFICANT CHANGE UP (ref 3.5–5.3)
POTASSIUM SERPL-SCNC: 4 MMOL/L — SIGNIFICANT CHANGE UP (ref 3.5–5.3)
PROT SERPL-MCNC: 6 G/DL — SIGNIFICANT CHANGE UP (ref 6–8.3)
PROTHROM AB SERPL-ACNC: 12.5 SEC — SIGNIFICANT CHANGE UP (ref 10.5–13.4)
RAPIDTEG MAXIMUM AMPLITUDE: 49 MM (ref 52–70)
RAPIDTEG MAXIMUM AMPLITUDE: 62.4 MM — SIGNIFICANT CHANGE UP (ref 52–70)
RBC # BLD: 4.29 M/UL — SIGNIFICANT CHANGE UP (ref 3.8–5.2)
RBC # FLD: 19.8 % — HIGH (ref 10.3–14.5)
RBC BLD AUTO: ABNORMAL
SAO2 % BLDV: 69.3 % — SIGNIFICANT CHANGE UP (ref 67–88)
SAO2 % BLDV: 84.5 % — SIGNIFICANT CHANGE UP (ref 67–88)
SAO2 % BLDV: 87.3 % — SIGNIFICANT CHANGE UP (ref 67–88)
SAO2 % BLDV: 88.8 % — HIGH (ref 67–88)
SAO2 % BLDV: 91.3 % — HIGH (ref 67–88)
SAO2 % BLDV: 93.6 % — HIGH (ref 67–88)
SAO2 % BLDV: 95 % — HIGH (ref 67–88)
SAO2 % BLDV: 96.6 % — HIGH (ref 67–88)
SAO2 % BLDV: 96.6 % — HIGH (ref 67–88)
SCHISTOCYTES BLD QL AUTO: SLIGHT — SIGNIFICANT CHANGE UP
SODIUM SERPL-SCNC: 142 MMOL/L — SIGNIFICANT CHANGE UP (ref 135–145)
TARGETS BLD QL SMEAR: SLIGHT — SIGNIFICANT CHANGE UP
TEG FUNCTIONAL FIBRINOGEN: 14.5 MM (ref 15–32)
TEG FUNCTIONAL FIBRINOGEN: 21.6 MM — SIGNIFICANT CHANGE UP (ref 15–32)
TEG MAXIMUM AMPLITUDE: 52.4 MM — SIGNIFICANT CHANGE UP (ref 52–69)
TEG MAXIMUM AMPLITUDE: 64.2 MM — SIGNIFICANT CHANGE UP (ref 52–69)
TEG REACTION TIME: 10.7 MIN (ref 4.6–9.1)
TEG REACTION TIME: >17 MIN (ref 4.6–9.1)
TSH SERPL-MCNC: 3.3 UIU/ML — SIGNIFICANT CHANGE UP (ref 0.27–4.2)
WBC # BLD: 11.69 K/UL — HIGH (ref 3.8–10.5)
WBC # FLD AUTO: 11.69 K/UL — HIGH (ref 3.8–10.5)

## 2022-09-06 PROCEDURE — 36620 INSERTION CATHETER ARTERY: CPT | Mod: RT

## 2022-09-06 PROCEDURE — 36620 INSERTION CATHETER ARTERY: CPT | Mod: 59

## 2022-09-06 PROCEDURE — 33863 ASCENDING AORTIC GRAFT: CPT

## 2022-09-06 PROCEDURE — 99222 1ST HOSP IP/OBS MODERATE 55: CPT | Mod: 25

## 2022-09-06 PROCEDURE — 88305 TISSUE EXAM BY PATHOLOGIST: CPT | Mod: 26

## 2022-09-06 PROCEDURE — 99291 CRITICAL CARE FIRST HOUR: CPT | Mod: 25

## 2022-09-06 PROCEDURE — 33866 AORTIC HEMIARCH GRAFT: CPT

## 2022-09-06 DEVICE — KIT CVC 2LUM MAC 9FR CHG: Type: IMPLANTABLE DEVICE | Status: FUNCTIONAL

## 2022-09-06 DEVICE — INTRODUCER PERCUTANEOUS INSERTION KIT: Type: IMPLANTABLE DEVICE | Status: FUNCTIONAL

## 2022-09-06 DEVICE — CANNULA AORTIC ROOT 14G X 14CM FLANGED: Type: IMPLANTABLE DEVICE | Status: FUNCTIONAL

## 2022-09-06 DEVICE — PACING WIRE BLUE DARK 2-0 24" SH SKS-3: Type: IMPLANTABLE DEVICE | Status: FUNCTIONAL

## 2022-09-06 DEVICE — CANNULA ARTERIAL STRAIGHT 20FR X 3/8" VENTED: Type: IMPLANTABLE DEVICE | Status: FUNCTIONAL

## 2022-09-06 DEVICE — CHEST DRAIN THORACIC ARGYLE PVC 32FR RIGHT ANGLE: Type: IMPLANTABLE DEVICE | Status: FUNCTIONAL

## 2022-09-06 DEVICE — CANNULA VENOUS 2 STAGE THIN FLEX 29/29FR X 3/8" NON-VENTED: Type: IMPLANTABLE DEVICE | Status: FUNCTIONAL

## 2022-09-06 DEVICE — FELT PTFE 6 X 6": Type: IMPLANTABLE DEVICE | Status: FUNCTIONAL

## 2022-09-06 DEVICE — BIOGLUE 10ML SYR: Type: IMPLANTABLE DEVICE | Status: FUNCTIONAL

## 2022-09-06 DEVICE — GRAFT VASC GELWEAVE SB THOR 28/8: Type: IMPLANTABLE DEVICE | Status: FUNCTIONAL

## 2022-09-06 DEVICE — CANNULA PERFUSION  BALLOON 6MM: Type: IMPLANTABLE DEVICE | Status: FUNCTIONAL

## 2022-09-06 DEVICE — LIGATING CLIPS WECK HORIZON SMALL-WIDE (RED) 24: Type: IMPLANTABLE DEVICE | Status: FUNCTIONAL

## 2022-09-06 DEVICE — LIGATING CLIPS WECK HORIZON MEDIUM (BLUE) 24: Type: IMPLANTABLE DEVICE | Status: FUNCTIONAL

## 2022-09-06 DEVICE — STAPLER COVIDIEN TRI-STAPLE 45MM TAN RELOAD: Type: IMPLANTABLE DEVICE | Status: FUNCTIONAL

## 2022-09-06 DEVICE — CANNULA RCSP 15FR X 12".5" MANUAL-INFLATE CUFF WITH GUIDEWIRE STYLET: Type: IMPLANTABLE DEVICE | Status: FUNCTIONAL

## 2022-09-06 DEVICE — SURGICEL FIBRILLAR 2 X 4": Type: IMPLANTABLE DEVICE | Status: FUNCTIONAL

## 2022-09-06 DEVICE — IMPLANTABLE DEVICE: Type: IMPLANTABLE DEVICE | Status: FUNCTIONAL

## 2022-09-06 DEVICE — CANNULA ARTERIAL OPTISITE 20FR X 3/8" VENTED: Type: IMPLANTABLE DEVICE | Status: FUNCTIONAL

## 2022-09-06 DEVICE — CHEST DRAIN THORACIC ARGYLE PVC 36FR STRAIGHT: Type: IMPLANTABLE DEVICE | Status: FUNCTIONAL

## 2022-09-06 DEVICE — CATH VENT LEFT HEART SILICONE 20FR NON-VENTED: Type: IMPLANTABLE DEVICE | Status: FUNCTIONAL

## 2022-09-06 RX ORDER — SODIUM CHLORIDE 9 MG/ML
3 INJECTION INTRAMUSCULAR; INTRAVENOUS; SUBCUTANEOUS EVERY 8 HOURS
Refills: 0 | Status: DISCONTINUED | OUTPATIENT
Start: 2022-09-06 | End: 2022-09-06

## 2022-09-06 NOTE — H&P ADULT - HISTORY OF PRESENT ILLNESS
71F PMH DM, COPD, Chronic Adrenal Insufficiency on Chronic prednisone, history of colorectal cancer s/p resection (colostomy bag), Hx of CAD, Chronic A fib on Eliquis, and tracheomalacia and multiple intubations, recent dx of OM presented to ED at [facility] this morning with epigastric pain, belching and central chest pain. Found on CTA to have type A aortic dissection and transferred to [facility] for surgical evaluation by Dr. Cabrera.

## 2022-09-06 NOTE — H&P ADULT - ASSESSMENT
71F PMH DM, COPD, Chronic Adrenal Insufficiency on Chronic prednisone, history of colorectal cancer s/p resection (colostomy bag), Hx of CAD, Chronic A fib on Eliquis, and tracheomalacia and multiple intubations, recent dx of OM presented to ED at [facility] this morning with epigastric pain, belching and central chest pain. Found on CTA to have type A aortic dissection and transferred to [facility] for surgical evaluation by Dr. Carbera. Patient transferred to CTICU for b/l radial arterial line placements for BP monitoring, IV labetalol infusion initiated, patient went to OR for emergency type A aortic dissection repair with Dr. Cabrera.

## 2022-09-06 NOTE — H&P ADULT - NSICDXPASTSURGICALHX_GEN_ALL_CORE_FT
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    H/O pelvic surgery 5 years ago - s/p fracture    H/O total knee replacement, bilateral 5 years ago    History of partial hysterectomy 30 years ago - fibroids    History of sinus surgery multiple sinus surgeries    History of tracheomalacia 2015 - attempted tracheal stenting ([facility])- course complicated by obstruction, respiratory failure, multiple CPR attempts -  stent discontinued; 10/20/2016 Tracheobronchoplasty (Prolene Mesh) performed at [facility] by Dr Zapien    Rectal bleeding exam under anesthesia ([facility]) 2/2018    S/P bronchoscopy 6/5/2018 - [place] (Dr Zapien) no evidence of tracheobronchomalacia in trachea or bronchial tubes

## 2022-09-06 NOTE — PROGRESS NOTE ADULT - SUBJECTIVE AND OBJECTIVE BOX
CRITICAL CARE ATTENDING - CTICU    MEDICATIONS  (STANDING):                                  Daily     Daily         Critically Ill patient  : [x ] preoperative ,   [ ] post operative    Requires :  [ x] Arterial Line   [ ] Central Line  [ ] PA catheter  [ ] IABP  [ ] ECMO  [ ] LVAD  [ ] Ventilator  [ ] pacemaker [ ] Impella.                      [ x] ABG's     [x ] Pulse Oxymetry Monitoring  Bedside evaluation , monitoring , treatment of hemodynamics , fluids , IVP/ IVCD meds.        Diagnosis:     Admitted - chest / back pain     Type A Aortic Dissection     Hypertension - requires IVCD / IVP labetalol     Present, assisting, supervising:   [x ] A-Line insertion     Emergently to OR for Repair     COPD     A  Fib                             Discussed with CT surgeon, Physician's Assistant - Nurse Practitioner- Critical care medicine team.   Discussed at  AM / PM rounds.   Chart, labs , films reviewed.    Cumulative Critical Care Time Given Today : 30 min

## 2022-09-06 NOTE — H&P ADULT - PROBLEM SELECTOR PLAN 1
Patient transferred to CTICU for b/l radial arterial line placements for BP monitoring, IV labetalol infusion initiated, patient went to OR for emergency type A aortic dissection repair with Dr. Cabrera.

## 2022-09-06 NOTE — H&P ADULT - NSHPPHYSICALEXAM_GEN_ALL_CORE
CONSTITUTIONAL: Well groomed, no apparent distress  EYES: PERRLA and symmetric, EOMI, No conjunctival or scleral injection, non-icteric  ENMT: Oral mucosa with moist membranes. Normal dentition; no pharyngeal injection or exudates             NECK: Supple, symmetric and without tracheal deviation   RESP: No respiratory distress, no use of accessory muscles; CTA b/l, no WRR  CV: RRR, +S1S2, no MRG; no JVD; no peripheral edema  GI: Soft, NT, ND, no rebound, no guarding; no palpable masses; no hepatosplenomegaly; no hernia palpated  SKIN: RLE wound  NEURO: No focal deficit   PSYCH: Appropriate insight/judgment; A+O x 3, mood and affect appropriate

## 2022-09-07 ENCOUNTER — TRANSCRIPTION ENCOUNTER (OUTPATIENT)
Age: 72
End: 2022-09-07

## 2022-09-07 LAB
A1C WITH ESTIMATED AVERAGE GLUCOSE RESULT: 9.4 % — HIGH (ref 4–5.6)
ALBUMIN SERPL ELPH-MCNC: 3.6 G/DL — SIGNIFICANT CHANGE UP (ref 3.3–5)
ALBUMIN SERPL ELPH-MCNC: 3.7 G/DL — SIGNIFICANT CHANGE UP (ref 3.3–5)
ALP SERPL-CCNC: 57 U/L — SIGNIFICANT CHANGE UP (ref 40–120)
ALP SERPL-CCNC: 62 U/L — SIGNIFICANT CHANGE UP (ref 40–120)
ALT FLD-CCNC: 26 U/L — SIGNIFICANT CHANGE UP (ref 10–45)
ALT FLD-CCNC: 28 U/L — SIGNIFICANT CHANGE UP (ref 10–45)
ANION GAP SERPL CALC-SCNC: 10 MMOL/L — SIGNIFICANT CHANGE UP (ref 5–17)
ANION GAP SERPL CALC-SCNC: 13 MMOL/L — SIGNIFICANT CHANGE UP (ref 5–17)
APPEARANCE UR: ABNORMAL
APTT BLD: 25.9 SEC — LOW (ref 27.5–35.5)
APTT BLD: 28.4 SEC — SIGNIFICANT CHANGE UP (ref 27.5–35.5)
AST SERPL-CCNC: 55 U/L — HIGH (ref 10–40)
AST SERPL-CCNC: 59 U/L — HIGH (ref 10–40)
BASE EXCESS BLDV CALC-SCNC: 0.2 MMOL/L — SIGNIFICANT CHANGE UP (ref -2–3)
BASE EXCESS BLDV CALC-SCNC: 1.6 MMOL/L — SIGNIFICANT CHANGE UP (ref -2–3)
BASE EXCESS BLDV CALC-SCNC: 3.8 MMOL/L — HIGH (ref -2–3)
BILIRUB SERPL-MCNC: 1.2 MG/DL — SIGNIFICANT CHANGE UP (ref 0.2–1.2)
BILIRUB SERPL-MCNC: 1.5 MG/DL — HIGH (ref 0.2–1.2)
BILIRUB UR-MCNC: NEGATIVE — SIGNIFICANT CHANGE UP
BLOOD GAS VENOUS - CREATININE: SIGNIFICANT CHANGE UP MG/DL (ref 0.5–1.3)
BUN SERPL-MCNC: 21 MG/DL — SIGNIFICANT CHANGE UP (ref 7–23)
BUN SERPL-MCNC: 21 MG/DL — SIGNIFICANT CHANGE UP (ref 7–23)
CA-I SERPL-SCNC: 1.09 MMOL/L — LOW (ref 1.15–1.33)
CA-I SERPL-SCNC: 1.14 MMOL/L — LOW (ref 1.15–1.33)
CALCIUM SERPL-MCNC: 8 MG/DL — LOW (ref 8.4–10.5)
CALCIUM SERPL-MCNC: 8.4 MG/DL — SIGNIFICANT CHANGE UP (ref 8.4–10.5)
CHLORIDE BLDV-SCNC: 110 MMOL/L — HIGH (ref 96–108)
CHLORIDE BLDV-SCNC: 114 MMOL/L — HIGH (ref 96–108)
CHLORIDE SERPL-SCNC: 107 MMOL/L — SIGNIFICANT CHANGE UP (ref 96–108)
CHLORIDE SERPL-SCNC: 108 MMOL/L — SIGNIFICANT CHANGE UP (ref 96–108)
CO2 BLDV-SCNC: 27 MMOL/L — HIGH (ref 22–26)
CO2 BLDV-SCNC: 30 MMOL/L — HIGH (ref 22–26)
CO2 BLDV-SCNC: 31 MMOL/L — HIGH (ref 22–26)
CO2 SERPL-SCNC: 26 MMOL/L — SIGNIFICANT CHANGE UP (ref 22–31)
CO2 SERPL-SCNC: 27 MMOL/L — SIGNIFICANT CHANGE UP (ref 22–31)
COLOR SPEC: SIGNIFICANT CHANGE UP
CREAT SERPL-MCNC: 0.65 MG/DL — SIGNIFICANT CHANGE UP (ref 0.5–1.3)
CREAT SERPL-MCNC: 0.7 MG/DL — SIGNIFICANT CHANGE UP (ref 0.5–1.3)
DIFF PNL FLD: ABNORMAL
EGFR: 91 ML/MIN/1.73M2 — SIGNIFICANT CHANGE UP
EGFR: 93 ML/MIN/1.73M2 — SIGNIFICANT CHANGE UP
ESTIMATED AVERAGE GLUCOSE: 223 MG/DL — HIGH (ref 68–114)
FIBRINOGEN PPP-MCNC: 417 MG/DL — SIGNIFICANT CHANGE UP (ref 330–520)
FIBRINOGEN PPP-MCNC: 537 MG/DL — HIGH (ref 330–520)
GAS PNL BLDA: SIGNIFICANT CHANGE UP
GAS PNL BLDV: 143 MMOL/L — SIGNIFICANT CHANGE UP (ref 136–145)
GAS PNL BLDV: 144 MMOL/L — SIGNIFICANT CHANGE UP (ref 136–145)
GAS PNL BLDV: SIGNIFICANT CHANGE UP
GLUCOSE BLDC GLUCOMTR-MCNC: 104 MG/DL — HIGH (ref 70–99)
GLUCOSE BLDC GLUCOMTR-MCNC: 106 MG/DL — HIGH (ref 70–99)
GLUCOSE BLDC GLUCOMTR-MCNC: 112 MG/DL — HIGH (ref 70–99)
GLUCOSE BLDC GLUCOMTR-MCNC: 118 MG/DL — HIGH (ref 70–99)
GLUCOSE BLDC GLUCOMTR-MCNC: 120 MG/DL — HIGH (ref 70–99)
GLUCOSE BLDC GLUCOMTR-MCNC: 122 MG/DL — HIGH (ref 70–99)
GLUCOSE BLDC GLUCOMTR-MCNC: 123 MG/DL — HIGH (ref 70–99)
GLUCOSE BLDC GLUCOMTR-MCNC: 127 MG/DL — HIGH (ref 70–99)
GLUCOSE BLDC GLUCOMTR-MCNC: 130 MG/DL — HIGH (ref 70–99)
GLUCOSE BLDC GLUCOMTR-MCNC: 131 MG/DL — HIGH (ref 70–99)
GLUCOSE BLDC GLUCOMTR-MCNC: 132 MG/DL — HIGH (ref 70–99)
GLUCOSE BLDC GLUCOMTR-MCNC: 145 MG/DL — HIGH (ref 70–99)
GLUCOSE BLDC GLUCOMTR-MCNC: 150 MG/DL — HIGH (ref 70–99)
GLUCOSE BLDC GLUCOMTR-MCNC: 151 MG/DL — HIGH (ref 70–99)
GLUCOSE BLDC GLUCOMTR-MCNC: 160 MG/DL — HIGH (ref 70–99)
GLUCOSE BLDC GLUCOMTR-MCNC: 162 MG/DL — HIGH (ref 70–99)
GLUCOSE BLDC GLUCOMTR-MCNC: 206 MG/DL — HIGH (ref 70–99)
GLUCOSE BLDC GLUCOMTR-MCNC: 211 MG/DL — HIGH (ref 70–99)
GLUCOSE BLDC GLUCOMTR-MCNC: 246 MG/DL — HIGH (ref 70–99)
GLUCOSE BLDC GLUCOMTR-MCNC: 269 MG/DL — HIGH (ref 70–99)
GLUCOSE BLDC GLUCOMTR-MCNC: 276 MG/DL — HIGH (ref 70–99)
GLUCOSE BLDC GLUCOMTR-MCNC: 91 MG/DL — SIGNIFICANT CHANGE UP (ref 70–99)
GLUCOSE BLDC GLUCOMTR-MCNC: 94 MG/DL — SIGNIFICANT CHANGE UP (ref 70–99)
GLUCOSE BLDC GLUCOMTR-MCNC: 98 MG/DL — SIGNIFICANT CHANGE UP (ref 70–99)
GLUCOSE BLDV-MCNC: 101 MG/DL — HIGH (ref 70–99)
GLUCOSE BLDV-MCNC: 129 MG/DL — HIGH (ref 70–99)
GLUCOSE SERPL-MCNC: 180 MG/DL — HIGH (ref 70–99)
GLUCOSE SERPL-MCNC: 272 MG/DL — HIGH (ref 70–99)
GLUCOSE UR QL: NEGATIVE — SIGNIFICANT CHANGE UP
HCO3 BLDV-SCNC: 25 MMOL/L — SIGNIFICANT CHANGE UP (ref 22–29)
HCO3 BLDV-SCNC: 28 MMOL/L — SIGNIFICANT CHANGE UP (ref 22–29)
HCO3 BLDV-SCNC: 29 MMOL/L — SIGNIFICANT CHANGE UP (ref 22–29)
HCT VFR BLD CALC: 25.2 % — LOW (ref 34.5–45)
HCT VFR BLD CALC: 25.4 % — LOW (ref 34.5–45)
HCT VFR BLDA CALC: 25 % — LOW (ref 34.5–46.5)
HCT VFR BLDA CALC: 28 % — LOW (ref 34.5–46.5)
HGB BLD CALC-MCNC: 8.2 G/DL — LOW (ref 11.7–16.1)
HGB BLD CALC-MCNC: 9.4 G/DL — LOW (ref 11.7–16.1)
HGB BLD-MCNC: 7.9 G/DL — LOW (ref 11.5–15.5)
HGB BLD-MCNC: 8 G/DL — LOW (ref 11.5–15.5)
HOROWITZ INDEX BLDV+IHG-RTO: 100 — SIGNIFICANT CHANGE UP
HOROWITZ INDEX BLDV+IHG-RTO: 40 — SIGNIFICANT CHANGE UP
INR BLD: 1.02 RATIO — SIGNIFICANT CHANGE UP (ref 0.88–1.16)
INR BLD: 1.09 RATIO — SIGNIFICANT CHANGE UP (ref 0.88–1.16)
KETONES UR-MCNC: NEGATIVE — SIGNIFICANT CHANGE UP
LACTATE BLDV-MCNC: 0.9 MMOL/L — SIGNIFICANT CHANGE UP (ref 0.5–2)
LACTATE BLDV-MCNC: 2.3 MMOL/L — HIGH (ref 0.5–2)
LEUKOCYTE ESTERASE UR-ACNC: NEGATIVE — SIGNIFICANT CHANGE UP
MAGNESIUM SERPL-MCNC: 3.4 MG/DL — HIGH (ref 1.6–2.6)
MAGNESIUM SERPL-MCNC: 3.7 MG/DL — HIGH (ref 1.6–2.6)
MCHC RBC-ENTMCNC: 22.2 PG — LOW (ref 27–34)
MCHC RBC-ENTMCNC: 23.4 PG — LOW (ref 27–34)
MCHC RBC-ENTMCNC: 31.3 GM/DL — LOW (ref 32–36)
MCHC RBC-ENTMCNC: 31.5 GM/DL — LOW (ref 32–36)
MCV RBC AUTO: 70.8 FL — LOW (ref 80–100)
MCV RBC AUTO: 74.3 FL — LOW (ref 80–100)
MRSA PCR RESULT.: SIGNIFICANT CHANGE UP
NITRITE UR-MCNC: NEGATIVE — SIGNIFICANT CHANGE UP
NRBC # BLD: 0 /100 WBCS — SIGNIFICANT CHANGE UP (ref 0–0)
NRBC # BLD: 0 /100 WBCS — SIGNIFICANT CHANGE UP (ref 0–0)
PCO2 BLDV: 43 MMHG — HIGH (ref 39–42)
PCO2 BLDV: 43 MMHG — HIGH (ref 39–42)
PCO2 BLDV: 56 MMHG — HIGH (ref 39–42)
PH BLDV: 7.32 — SIGNIFICANT CHANGE UP (ref 7.32–7.43)
PH BLDV: 7.38 — SIGNIFICANT CHANGE UP (ref 7.32–7.43)
PH BLDV: 7.43 — SIGNIFICANT CHANGE UP (ref 7.32–7.43)
PH UR: 6 — SIGNIFICANT CHANGE UP (ref 5–8)
PHOSPHATE SERPL-MCNC: 2.6 MG/DL — SIGNIFICANT CHANGE UP (ref 2.5–4.5)
PHOSPHATE SERPL-MCNC: 3.4 MG/DL — SIGNIFICANT CHANGE UP (ref 2.5–4.5)
PLATELET # BLD AUTO: 209 K/UL — SIGNIFICANT CHANGE UP (ref 150–400)
PLATELET # BLD AUTO: 224 K/UL — SIGNIFICANT CHANGE UP (ref 150–400)
PO2 BLDV: 41 MMHG — SIGNIFICANT CHANGE UP (ref 25–45)
PO2 BLDV: 48 MMHG — HIGH (ref 25–45)
PO2 BLDV: 48 MMHG — HIGH (ref 25–45)
POTASSIUM BLDV-SCNC: 3.5 MMOL/L — SIGNIFICANT CHANGE UP (ref 3.5–5.1)
POTASSIUM BLDV-SCNC: 3.8 MMOL/L — SIGNIFICANT CHANGE UP (ref 3.5–5.1)
POTASSIUM SERPL-MCNC: 4.5 MMOL/L — SIGNIFICANT CHANGE UP (ref 3.5–5.3)
POTASSIUM SERPL-MCNC: 4.7 MMOL/L — SIGNIFICANT CHANGE UP (ref 3.5–5.3)
POTASSIUM SERPL-SCNC: 4.5 MMOL/L — SIGNIFICANT CHANGE UP (ref 3.5–5.3)
POTASSIUM SERPL-SCNC: 4.7 MMOL/L — SIGNIFICANT CHANGE UP (ref 3.5–5.3)
PROT SERPL-MCNC: 5.5 G/DL — LOW (ref 6–8.3)
PROT SERPL-MCNC: 5.8 G/DL — LOW (ref 6–8.3)
PROT UR-MCNC: ABNORMAL
PROTHROM AB SERPL-ACNC: 11.7 SEC — SIGNIFICANT CHANGE UP (ref 10.5–13.4)
PROTHROM AB SERPL-ACNC: 12.5 SEC — SIGNIFICANT CHANGE UP (ref 10.5–13.4)
RBC # BLD: 3.42 M/UL — LOW (ref 3.8–5.2)
RBC # BLD: 3.56 M/UL — LOW (ref 3.8–5.2)
RBC # FLD: 23.2 % — HIGH (ref 10.3–14.5)
RBC # FLD: 23.7 % — HIGH (ref 10.3–14.5)
S AUREUS DNA NOSE QL NAA+PROBE: SIGNIFICANT CHANGE UP
SAO2 % BLDV: 72.5 % — SIGNIFICANT CHANGE UP (ref 67–88)
SAO2 % BLDV: 78.8 % — SIGNIFICANT CHANGE UP (ref 67–88)
SAO2 % BLDV: 82.1 % — SIGNIFICANT CHANGE UP (ref 67–88)
SARS-COV-2 RNA SPEC QL NAA+PROBE: SIGNIFICANT CHANGE UP
SODIUM SERPL-SCNC: 144 MMOL/L — SIGNIFICANT CHANGE UP (ref 135–145)
SODIUM SERPL-SCNC: 147 MMOL/L — HIGH (ref 135–145)
SP GR SPEC: 1.04 — HIGH (ref 1.01–1.02)
UROBILINOGEN FLD QL: 0.2 — SIGNIFICANT CHANGE UP
WBC # BLD: 11.79 K/UL — HIGH (ref 3.8–10.5)
WBC # BLD: 14.14 K/UL — HIGH (ref 3.8–10.5)
WBC # FLD AUTO: 11.79 K/UL — HIGH (ref 3.8–10.5)
WBC # FLD AUTO: 14.14 K/UL — HIGH (ref 3.8–10.5)

## 2022-09-07 PROCEDURE — 99292 CRITICAL CARE ADDL 30 MIN: CPT | Mod: 24

## 2022-09-07 PROCEDURE — 71045 X-RAY EXAM CHEST 1 VIEW: CPT | Mod: 26

## 2022-09-07 PROCEDURE — 99291 CRITICAL CARE FIRST HOUR: CPT | Mod: 24

## 2022-09-07 RX ORDER — AMINOCAPROIC ACID 500 MG/1
5 TABLET ORAL ONCE
Refills: 0 | Status: COMPLETED | OUTPATIENT
Start: 2022-09-07 | End: 2022-09-07

## 2022-09-07 RX ORDER — SODIUM CHLORIDE 9 MG/ML
1000 INJECTION INTRAMUSCULAR; INTRAVENOUS; SUBCUTANEOUS
Refills: 0 | Status: DISCONTINUED | OUTPATIENT
Start: 2022-09-07 | End: 2022-09-13

## 2022-09-07 RX ORDER — FUROSEMIDE 40 MG
20 TABLET ORAL ONCE
Refills: 0 | Status: COMPLETED | OUTPATIENT
Start: 2022-09-07 | End: 2022-09-07

## 2022-09-07 RX ORDER — VANCOMYCIN HCL 1 G
1000 VIAL (EA) INTRAVENOUS EVERY 8 HOURS
Refills: 0 | Status: DISCONTINUED | OUTPATIENT
Start: 2022-09-07 | End: 2022-09-07

## 2022-09-07 RX ORDER — CALCIUM GLUCONATE 100 MG/ML
2 VIAL (ML) INTRAVENOUS ONCE
Refills: 0 | Status: COMPLETED | OUTPATIENT
Start: 2022-09-07 | End: 2022-09-07

## 2022-09-07 RX ORDER — PANTOPRAZOLE SODIUM 20 MG/1
40 TABLET, DELAYED RELEASE ORAL DAILY
Refills: 0 | Status: DISCONTINUED | OUTPATIENT
Start: 2022-09-07 | End: 2022-10-10

## 2022-09-07 RX ORDER — METOCLOPRAMIDE HCL 10 MG
10 TABLET ORAL EVERY 8 HOURS
Refills: 0 | Status: COMPLETED | OUTPATIENT
Start: 2022-09-07 | End: 2022-09-08

## 2022-09-07 RX ORDER — MEPERIDINE HYDROCHLORIDE 50 MG/ML
25 INJECTION INTRAMUSCULAR; INTRAVENOUS; SUBCUTANEOUS ONCE
Refills: 0 | Status: DISCONTINUED | OUTPATIENT
Start: 2022-09-07 | End: 2022-09-07

## 2022-09-07 RX ORDER — CHLORHEXIDINE GLUCONATE 213 G/1000ML
15 SOLUTION TOPICAL EVERY 12 HOURS
Refills: 0 | Status: DISCONTINUED | OUTPATIENT
Start: 2022-09-07 | End: 2022-09-08

## 2022-09-07 RX ORDER — DEXMEDETOMIDINE HYDROCHLORIDE IN 0.9% SODIUM CHLORIDE 4 UG/ML
0.4 INJECTION INTRAVENOUS
Qty: 200 | Refills: 0 | Status: DISCONTINUED | OUTPATIENT
Start: 2022-09-07 | End: 2022-09-13

## 2022-09-07 RX ORDER — FENTANYL CITRATE 50 UG/ML
25 INJECTION INTRAVENOUS ONCE
Refills: 0 | Status: DISCONTINUED | OUTPATIENT
Start: 2022-09-07 | End: 2022-09-07

## 2022-09-07 RX ORDER — IPRATROPIUM/ALBUTEROL SULFATE 18-103MCG
3 AEROSOL WITH ADAPTER (GRAM) INHALATION EVERY 6 HOURS
Refills: 0 | Status: DISCONTINUED | OUTPATIENT
Start: 2022-09-07 | End: 2022-10-01

## 2022-09-07 RX ORDER — ACETAMINOPHEN 500 MG
1000 TABLET ORAL ONCE
Refills: 0 | Status: COMPLETED | OUTPATIENT
Start: 2022-09-07 | End: 2022-09-07

## 2022-09-07 RX ORDER — CHLORHEXIDINE GLUCONATE 213 G/1000ML
5 SOLUTION TOPICAL
Refills: 0 | Status: COMPLETED | OUTPATIENT
Start: 2022-09-07 | End: 2022-09-16

## 2022-09-07 RX ORDER — POTASSIUM CHLORIDE 20 MEQ
10 PACKET (EA) ORAL
Refills: 0 | Status: DISCONTINUED | OUTPATIENT
Start: 2022-09-07 | End: 2022-09-08

## 2022-09-07 RX ORDER — POTASSIUM CHLORIDE 20 MEQ
10 PACKET (EA) ORAL ONCE
Refills: 0 | Status: COMPLETED | OUTPATIENT
Start: 2022-09-07 | End: 2022-09-07

## 2022-09-07 RX ORDER — ACETYLCYSTEINE 200 MG/ML
4 VIAL (ML) MISCELLANEOUS EVERY 6 HOURS
Refills: 0 | Status: COMPLETED | OUTPATIENT
Start: 2022-09-07 | End: 2022-09-08

## 2022-09-07 RX ORDER — DEXTROSE 50 % IN WATER 50 %
25 SYRINGE (ML) INTRAVENOUS
Refills: 0 | Status: DISCONTINUED | OUTPATIENT
Start: 2022-09-07 | End: 2022-09-08

## 2022-09-07 RX ORDER — PROTAMINE SULFATE 10 MG/ML
50 AMPUL (ML) INTRAVENOUS ONCE
Refills: 0 | Status: COMPLETED | OUTPATIENT
Start: 2022-09-07 | End: 2022-09-07

## 2022-09-07 RX ORDER — BUDESONIDE, MICRONIZED 100 %
0.5 POWDER (GRAM) MISCELLANEOUS EVERY 12 HOURS
Refills: 0 | Status: DISCONTINUED | OUTPATIENT
Start: 2022-09-07 | End: 2022-09-08

## 2022-09-07 RX ORDER — DEXTROSE 50 % IN WATER 50 %
50 SYRINGE (ML) INTRAVENOUS
Refills: 0 | Status: DISCONTINUED | OUTPATIENT
Start: 2022-09-07 | End: 2022-09-08

## 2022-09-07 RX ORDER — POTASSIUM CHLORIDE 20 MEQ
10 PACKET (EA) ORAL
Refills: 0 | Status: COMPLETED | OUTPATIENT
Start: 2022-09-07 | End: 2022-09-07

## 2022-09-07 RX ORDER — HYDROCORTISONE 20 MG
50 TABLET ORAL EVERY 8 HOURS
Refills: 0 | Status: DISCONTINUED | OUTPATIENT
Start: 2022-09-07 | End: 2022-09-08

## 2022-09-07 RX ORDER — COLLAGENASE CLOSTRIDIUM HIST. 250 UNIT/G
1 OINTMENT (GRAM) TOPICAL DAILY
Refills: 0 | Status: DISCONTINUED | OUTPATIENT
Start: 2022-09-07 | End: 2022-10-10

## 2022-09-07 RX ORDER — MEXILETINE HYDROCHLORIDE 150 MG/1
200 CAPSULE ORAL EVERY 8 HOURS
Refills: 0 | Status: DISCONTINUED | OUTPATIENT
Start: 2022-09-07 | End: 2022-10-10

## 2022-09-07 RX ORDER — CHLORHEXIDINE GLUCONATE 213 G/1000ML
1 SOLUTION TOPICAL
Refills: 0 | Status: DISCONTINUED | OUTPATIENT
Start: 2022-09-07 | End: 2022-10-10

## 2022-09-07 RX ORDER — INSULIN HUMAN 100 [IU]/ML
3 INJECTION, SOLUTION SUBCUTANEOUS
Qty: 100 | Refills: 0 | Status: DISCONTINUED | OUTPATIENT
Start: 2022-09-07 | End: 2022-09-19

## 2022-09-07 RX ORDER — VANCOMYCIN HCL 1 G
1000 VIAL (EA) INTRAVENOUS EVERY 12 HOURS
Refills: 0 | Status: COMPLETED | OUTPATIENT
Start: 2022-09-07 | End: 2022-09-09

## 2022-09-07 RX ORDER — NICARDIPINE HYDROCHLORIDE 30 MG/1
5 CAPSULE, EXTENDED RELEASE ORAL
Qty: 40 | Refills: 0 | Status: DISCONTINUED | OUTPATIENT
Start: 2022-09-07 | End: 2022-09-09

## 2022-09-07 RX ORDER — ONDANSETRON 8 MG/1
4 TABLET, FILM COATED ORAL ONCE
Refills: 0 | Status: COMPLETED | OUTPATIENT
Start: 2022-09-07 | End: 2022-09-07

## 2022-09-07 RX ORDER — PROPOFOL 10 MG/ML
36.93 INJECTION, EMULSION INTRAVENOUS
Qty: 1000 | Refills: 0 | Status: DISCONTINUED | OUTPATIENT
Start: 2022-09-07 | End: 2022-09-08

## 2022-09-07 RX ORDER — MILRINONE LACTATE 1 MG/ML
0.4 INJECTION, SOLUTION INTRAVENOUS
Qty: 20 | Refills: 0 | Status: DISCONTINUED | OUTPATIENT
Start: 2022-09-07 | End: 2022-09-08

## 2022-09-07 RX ADMIN — FENTANYL CITRATE 25 MICROGRAM(S): 50 INJECTION INTRAVENOUS at 17:15

## 2022-09-07 RX ADMIN — FENTANYL CITRATE 25 MICROGRAM(S): 50 INJECTION INTRAVENOUS at 17:30

## 2022-09-07 RX ADMIN — PROPOFOL 15 MICROGRAM(S)/KG/MIN: 10 INJECTION, EMULSION INTRAVENOUS at 02:53

## 2022-09-07 RX ADMIN — CHLORHEXIDINE GLUCONATE 1 APPLICATION(S): 213 SOLUTION TOPICAL at 18:00

## 2022-09-07 RX ADMIN — Medication 50 MILLIGRAM(S): at 13:13

## 2022-09-07 RX ADMIN — MILRINONE LACTATE 8.12 MICROGRAM(S)/KG/MIN: 1 INJECTION, SOLUTION INTRAVENOUS at 10:20

## 2022-09-07 RX ADMIN — Medication 0.5 MILLIGRAM(S): at 17:39

## 2022-09-07 RX ADMIN — Medication 3 MILLILITER(S): at 12:28

## 2022-09-07 RX ADMIN — Medication 200 GRAM(S): at 00:30

## 2022-09-07 RX ADMIN — INSULIN HUMAN 3 UNIT(S)/HR: 100 INJECTION, SOLUTION SUBCUTANEOUS at 10:06

## 2022-09-07 RX ADMIN — NICARDIPINE HYDROCHLORIDE 25 MG/HR: 30 CAPSULE, EXTENDED RELEASE ORAL at 10:08

## 2022-09-07 RX ADMIN — AMINOCAPROIC ACID 250 GRAM(S): 500 TABLET ORAL at 00:30

## 2022-09-07 RX ADMIN — Medication 50 MILLIEQUIVALENT(S): at 10:07

## 2022-09-07 RX ADMIN — Medication 200 GRAM(S): at 04:37

## 2022-09-07 RX ADMIN — NICARDIPINE HYDROCHLORIDE 25 MG/HR: 30 CAPSULE, EXTENDED RELEASE ORAL at 02:53

## 2022-09-07 RX ADMIN — Medication 50 MILLIGRAM(S): at 21:45

## 2022-09-07 RX ADMIN — Medication 3 MILLILITER(S): at 23:17

## 2022-09-07 RX ADMIN — DEXMEDETOMIDINE HYDROCHLORIDE IN 0.9% SODIUM CHLORIDE 6.77 MICROGRAM(S)/KG/HR: 4 INJECTION INTRAVENOUS at 02:53

## 2022-09-07 RX ADMIN — Medication 104 MILLIGRAM(S): at 13:06

## 2022-09-07 RX ADMIN — DEXMEDETOMIDINE HYDROCHLORIDE IN 0.9% SODIUM CHLORIDE 6.77 MICROGRAM(S)/KG/HR: 4 INJECTION INTRAVENOUS at 10:07

## 2022-09-07 RX ADMIN — MILRINONE LACTATE 10.2 MICROGRAM(S)/KG/MIN: 1 INJECTION, SOLUTION INTRAVENOUS at 02:53

## 2022-09-07 RX ADMIN — CHLORHEXIDINE GLUCONATE 5 MILLILITER(S): 213 SOLUTION TOPICAL at 17:19

## 2022-09-07 RX ADMIN — Medication 3 MILLILITER(S): at 17:39

## 2022-09-07 RX ADMIN — ONDANSETRON 4 MILLIGRAM(S): 8 TABLET, FILM COATED ORAL at 17:19

## 2022-09-07 RX ADMIN — Medication 250 MILLIGRAM(S): at 02:00

## 2022-09-07 RX ADMIN — SODIUM CHLORIDE 10 MILLILITER(S): 9 INJECTION INTRAMUSCULAR; INTRAVENOUS; SUBCUTANEOUS at 10:08

## 2022-09-07 RX ADMIN — Medication 0.5 MILLIGRAM(S): at 02:57

## 2022-09-07 RX ADMIN — MEXILETINE HYDROCHLORIDE 200 MILLIGRAM(S): 150 CAPSULE ORAL at 13:09

## 2022-09-07 RX ADMIN — Medication 250 MILLIGRAM(S): at 13:19

## 2022-09-07 RX ADMIN — Medication 104 MILLIGRAM(S): at 06:34

## 2022-09-07 RX ADMIN — CHLORHEXIDINE GLUCONATE 15 MILLILITER(S): 213 SOLUTION TOPICAL at 17:19

## 2022-09-07 RX ADMIN — SODIUM CHLORIDE 10 MILLILITER(S): 9 INJECTION INTRAMUSCULAR; INTRAVENOUS; SUBCUTANEOUS at 02:54

## 2022-09-07 RX ADMIN — Medication 400 MILLIGRAM(S): at 11:45

## 2022-09-07 RX ADMIN — Medication 100 MILLIEQUIVALENT(S): at 19:14

## 2022-09-07 RX ADMIN — NICARDIPINE HYDROCHLORIDE 25 MG/HR: 30 CAPSULE, EXTENDED RELEASE ORAL at 19:50

## 2022-09-07 RX ADMIN — Medication 1 APPLICATION(S): at 16:57

## 2022-09-07 RX ADMIN — INSULIN HUMAN 3 UNIT(S)/HR: 100 INJECTION, SOLUTION SUBCUTANEOUS at 19:51

## 2022-09-07 RX ADMIN — CHLORHEXIDINE GLUCONATE 15 MILLILITER(S): 213 SOLUTION TOPICAL at 06:30

## 2022-09-07 RX ADMIN — INSULIN HUMAN 3 UNIT(S)/HR: 100 INJECTION, SOLUTION SUBCUTANEOUS at 02:53

## 2022-09-07 RX ADMIN — Medication 100 MILLIEQUIVALENT(S): at 17:14

## 2022-09-07 RX ADMIN — PANTOPRAZOLE SODIUM 40 MILLIGRAM(S): 20 TABLET, DELAYED RELEASE ORAL at 11:34

## 2022-09-07 RX ADMIN — MEXILETINE HYDROCHLORIDE 200 MILLIGRAM(S): 150 CAPSULE ORAL at 06:34

## 2022-09-07 RX ADMIN — Medication 50 MILLIGRAM(S): at 02:50

## 2022-09-07 RX ADMIN — Medication 104 MILLIGRAM(S): at 21:45

## 2022-09-07 RX ADMIN — Medication 330 MILLIGRAM(S): at 00:40

## 2022-09-07 RX ADMIN — Medication 20 MILLIGRAM(S): at 12:47

## 2022-09-07 RX ADMIN — Medication 1000 MILLIGRAM(S): at 12:30

## 2022-09-07 RX ADMIN — Medication 4 MILLILITER(S): at 23:17

## 2022-09-07 NOTE — DISCHARGE NOTE PROVIDER - NSDCCPCAREPLAN_GEN_ALL_CORE_FT
PRINCIPAL DISCHARGE DIAGNOSIS  Diagnosis: Ascending aortic dissection  Assessment and Plan of Treatment: s/p 9/7 emergent type A aortic dissection repair Melina   cbc, sma7 weekly & prn  physical therapy  wound care as noted  shower daily  weigh patient daily

## 2022-09-07 NOTE — CONSULT NOTE ADULT - ASSESSMENT
71F s/p right foot navicular bone biopsy, wound debridement and heel I&D with medial navicular wound   - pt seen and evaluated  - Afebrile, WBC 11.79  - Right foot with two navicular wounds with fibrous wound bed, no clinical signs of infection. L foot with no clinical signs of infection.   - No clinical signs of infection to b/l feet   - Continue Z flows at all times   - Rx santyl for R foot wound followed by DSD  - Completed Vanco and Ertapenem via mediport   - Pt is stable from podiatry for d/c, f/u and wound care instructions listed in discharge note provider  - Seen w/ attending

## 2022-09-07 NOTE — PROGRESS NOTE ADULT - SUBJECTIVE AND OBJECTIVE BOX
RAYRAY RODRIGUEZ  MRN-[id number]  Patient is a 71y old  Female who presents with a chief complaint of Type A aortic dissection (06 Sep 2022 16:32)    HPI:  71F PMH DM, COPD, Chronic Adrenal Insufficiency on Chronic prednisone, history of colorectal cancer s/p resection (colostomy bag), Hx of CAD, Chronic A fib on Eliquis, and tracheomalacia and multiple intubations, recent dx of OM presented to ED at [facility] this morning with epigastric pain, belching and central chest pain. Found on CTA to have type A aortic dissection and transferred to [facility] for surgical evaluation by Dr. Cabrera. (06 Sep 2022 16:32)      Surgery/Hospital course:    Today:    REVIEW OF SYSTEMS:  Gen: No fever  EYES/ENT: No visual changes;  No vertigo or throat pain   NECK: No pain   RES:  No shortness of breath or Cough  Chest: + incisional pain  CARD: No chest pain   GI: No abdominal pain  : No dysuria  NEURO: No weakness  SKIN: No itching, rashes     Physical Exam:  Vital Signs Last 24 Hrs  T(C): 36.9 (07 Sep 2022 04:00), Max: 36.9 (07 Sep 2022 04:00)  T(F): 98.4 (07 Sep 2022 04:00), Max: 98.4 (07 Sep 2022 04:00)  HR: 80 (07 Sep 2022 04:15) (80 - 80)  BP: --  BP(mean): --  RR: 16 (07 Sep 2022 04:15) (7 - 17)  SpO2: 100% (07 Sep 2022 04:15) (100% - 100%)    Parameters below as of 07 Sep 2022 04:00  Patient On (Oxygen Delivery Method): ventilator    O2 Concentration (%): 50  Gen:  Awake, alert   CNS: sedated  Neck: no JVD  RES : clear , no wheezing    Chest:   + chest tubes                     CVS: Regular  rhythm. Normal S1/S2  Abd: Soft, non-distended. Bowel sounds present.  Skin: No rash.  Ext:  no edema, A Line  ============================I/O===========================   I&O's Detail    06 Sep 2022 07:01  -  07 Sep 2022 04:18  --------------------------------------------------------  IN:    Cryoprecipitate: 150 mL    Dexmedetomidine: 34 mL    Insulin: 19 mL    IV PiggyBack: 100 mL    IV PiggyBack: 300 mL    Milrinone: 51 mL    NiCARdipine: 75 mL    Plasma: 600 mL    Platelets - Single Donor: 225 mL    PRBCs (Packed Red Blood Cells): 300 mL    Propofol: 16.4 mL    sodium chloride 0.9%: 50 mL  Total IN: 1920.4 mL    OUT:    Chest Tube (mL): 230 mL    Chest Tube (mL): 270 mL    Indwelling Catheter - Urethral (mL): 365 mL  Total OUT: 865 mL    Total NET: 1055.4 mL        ============================ LABS =========================                        8.0    11.79 )-----------( 224      ( 07 Sep 2022 02:43 )             25.4     09-07    144  |  107  |  21  ----------------------------<  272<H>  4.7   |  27  |  0.70    Ca    8.4      07 Sep 2022 02:45  Phos  3.4     09-07  Mg     3.4     09-07    TPro  5.5<L>  /  Alb  3.6  /  TBili  1.2  /  DBili  x   /  AST  55<H>  /  ALT  26  /  AlkPhos  57  09-07    LIVER FUNCTIONS - ( 07 Sep 2022 02:45 )  Alb: 3.6 g/dL / Pro: 5.5 g/dL / ALK PHOS: 57 U/L / ALT: 26 U/L / AST: 55 U/L / GGT: x           PT/INR - ( 07 Sep 2022 02:43 )   PT: 12.5 sec;   INR: 1.09 ratio         PTT - ( 07 Sep 2022 02:43 )  PTT:25.9 sec  ABG - ( 07 Sep 2022 02:37 )  pH, Arterial: 7.36  pH, Blood: x     /  pCO2: 50    /  pO2: 171   / HCO3: 28    / Base Excess: 2.0   /  SaO2: 97.9                ======================Micro/Rad/Cardio=================  Culture: Reviewed   CXR: Reviewed  Echo:Reviewed  ======================================================  PAST MEDICAL & SURGICAL HISTORY:  Atrial fibrillation  paroxysmal, on eliquis      Diabetes  Type 2      COPD (chronic obstructive pulmonary disease)      Adrenal insufficiency  Medrol daily for over 50 years      Aortic insufficiency  moderate AR on echo 5/3/2018      Pelvic fracture      Asthma      Tracheobronchomalacia  diagnosed 2015, s/p bronchial thermoplasty 2016 (Dr Zapien); recent bronchoscopy 6/5/2018 revealed no evidence of tracheobronchomalacia in trachea or bronchial tubes      Colorectal cancer  4/2018- last treatment , chemo and radiation      Rectal bleeding      Seizure  x 1 1/7/18      DVT (deep venous thrombosis)  15-20 years ago, took coumadin      TIA (transient ischemic attack)  multiple, last 5 years ago - presents as right-sided weakness      History of partial hysterectomy  30 years ago - fibroids      H/O total knee replacement, bilateral  5 years ago      History of sinus surgery  multiple sinus surgeries      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      H/O pelvic surgery  5 years ago - s/p fracture      History of tracheomalacia  2015 - attempted tracheal stenting ([facility])- course complicated by obstruction, respiratory failure, multiple CPR attempts -  stent discontinued; 10/20/2016 Tracheobronchoplasty (Prolene Mesh) performed at [facility] by Dr Zapien      S/P bronchoscopy  6/5/2018 - [place] (Dr Zapien) no evidence of tracheobronchomalacia in trachea or bronchial tubes      Rectal bleeding  exam under anesthesia ([facility]) 2/2018        ====================ASSESMENT ==============  OR  Acute Respiratory Failure post op  acute on chronic systolic CHF  hemodynamic instability  Anemia blood loss  Hyperglycemia  Diabetes mellitus type 2  Hypothyroidism     Plan:    09-06-22 @ 07:01  -  09-07-22 @ 04:18  --------------------------------------------------------  IN: 1920.4 mL      ====================== NEUROLOGY=====================  dexMEDEtomidine Infusion 0.4 MICROgram(s)/kG/Hr (6.77 mL/Hr) IV Continuous <Continuous>  metoclopramide  IVPB 10 milliGRAM(s) IV Intermittent every 8 hours  propofol Infusion 36.928 MICROgram(s)/kG/Min (15 mL/Hr) IV Continuous <Continuous>    ==================== RESPIRATORY======================  Mechanical Ventilation:  Mode: AC/ CMV (Assist Control/ Continuous Mandatory Ventilation)  RR (machine): 14  TV (machine): 500  FiO2: 50  PEEP: 10  ITime: 1  MAP: 14  PIP: 28    wean to extubate  albuterol/ipratropium for Nebulization 3 milliLiter(s) Nebulizer every 6 hours  buDESOnide    Inhalation Suspension 0.5 milliGRAM(s) Inhalation every 12 hours    ====================CARDIOVASCULAR==================  mexiletine 200 milliGRAM(s) Oral every 8 hours  milrinone Infusion 0.5 MICROgram(s)/kG/Min (10.2 mL/Hr) IV Continuous <Continuous>  niCARdipine Infusion 5 mG/Hr (25 mL/Hr) IV Continuous <Continuous>    ===================HEMATOLOGIC/ONC ===================    monitor plts and Hb/Hct  ===================== RENAL =========================  Amezcua for monitoring urine output    ==================== GASTROINTESTINAL===================  calcium gluconate IVPB 2 Gram(s) IV Intermittent once  pantoprazole  Injectable 40 milliGRAM(s) IV Push daily  potassium chloride  10 mEq/50 mL IVPB 10 milliEquivalent(s) IV Intermittent every 1 hour  potassium chloride  10 mEq/50 mL IVPB 10 milliEquivalent(s) IV Intermittent every 1 hour  potassium chloride  10 mEq/50 mL IVPB 10 milliEquivalent(s) IV Intermittent every 1 hour  sodium chloride 0.9%. 1000 milliLiter(s) (10 mL/Hr) IV Continuous <Continuous>    =======================    ENDOCRINE  =====================  dextrose 50% Injectable 50 milliLiter(s) IV Push every 15 minutes  dextrose 50% Injectable 25 milliLiter(s) IV Push every 15 minutes  hydrocortisone sodium succinate Injectable 50 milliGRAM(s) IV Push every 8 hours  insulin regular Infusion 3 Unit(s)/Hr (3 mL/Hr) IV Continuous <Continuous>    ========================INFECTIOUS DISEASE================  vancomycin  IVPB 1000 milliGRAM(s) IV Intermittent every 8 hours    .crit         ERROR

## 2022-09-07 NOTE — PROGRESS NOTE ADULT - SUBJECTIVE AND OBJECTIVE BOX
Patient seen and examined at the bedside.    Remained critically ill on continuous ICU monitoring.    OBJECTIVE:  Vital Signs Last 24 Hrs  T(C): 36.4 (07 Sep 2022 08:00), Max: 36.9 (07 Sep 2022 04:00)  T(F): 97.5 (07 Sep 2022 08:00), Max: 98.4 (07 Sep 2022 04:00)  HR: 66 (07 Sep 2022 08:00) (66 - 86)  BP: --  BP(mean): --  RR: 14 (07 Sep 2022 08:00) (6 - 17)  SpO2: 100% (07 Sep 2022 08:00) (99% - 100%)    Parameters below as of 07 Sep 2022 08:00  Patient On (Oxygen Delivery Method): ventilator,peep 5    O2 Concentration (%): 40      Assessment:  71F PMH DM, COPD, Chronic Adrenal Insufficiency on Chronic prednisone, history of colorectal cancer s/p resection (colostomy bag), Hx of CAD, Chronic A fib on Eliquis, and tracheomalacia and multiple intubations, recent dx of OM presented to ED at [facility] this morning with epigastric pain, belching and central chest pain. Found on CTA to have type A aortic dissection and transferred to [facility] for surgical evaluation by Dr. Cabrera.     Ascending aortic dissection s/p modified bentall and hemiarch on 9/6   Hemodynamic instability  Cardiogenic shock   Hypovolemic shock   Lactic acidosis  Post op respiratory insufficiency  Acute blood loss anemia  Stress hyperglycemia     Plan:   ***Neuro***  Sedated with [x] Precedex and [x] Diprivan   Post operative neuro assessment     ***Cardiovascular***  Invasive hemodynamic monitoring, assess perfusion indices   SR / CVP 13 / MAP 84 / Hct 25.4 / Lactate 2.2   [x] Temporary pacing - VVI paced at 60   [x] Primacor 0.4mcg [x] Cardene weaned off overnight   Continuos reassessment of hemodynamics   Mexiletine for hx of afib  Monitor chest tube outputs     Serial EKG and cardiac enzymes     ***Pulmonary***  Post op vent management   Titration of FiO2 and PEEP, follow SpO2, CXR, blood gasses   Hx of COPD / Continue bronchodilators and Solucortef     Mode: AC/ CMV (Assist Control/ Continuous Mandatory Ventilation)  RR (machine): 14  TV (machine): 500  FiO2: 50  PEEP: 10  ITime: 1  MAP: 14  PIP: 28              ***GI***  [x] NPO   [x] Protonix    Reglan for gut motility     ***Renal***  Continue to monitor I/Os, BUN/Creatinine.   Replete lytes PRN  Amezcua present      ***ID***  Perioperative coverage with Vancomycin     ***Endocrine***  [x]  DM : HbA1c 9.4%                - [x] Insulin gtt                - Need tight glycemic control to prevent wound infection.         Patient requires continuous monitoring with bedside rhythm monitoring, pulse oximetry monitoring, and continuous central venous and arterial pressure monitoring; and intermittent blood gas analysis. Care plan discussed with the ICU care team.   Patient remained critical, at risk for life threatening decompensation.    I have spent 30 minutes providing critical care management to this patient.    By signing my name below, I, Emelia Felipe, attest that this documentation has been prepared under the direction and in the presence of Bj Abbott MD   Electronically signed: Georgi Burleson, 09-07-22 @ 08:44    I, Bj Abbott, personally performed the services described in this documentation. all medical record entries made by the marcyibronaldo were at my direction and in my presence. I have reviewed the chart and agree that the record reflects my personal performance and is accurate and complete  Electronically signed: Bj Abbott MD  Patient seen and examined at the bedside.    Remained critically ill on continuous ICU monitoring.    OBJECTIVE:  Vital Signs Last 24 Hrs  T(C): 36.4 (07 Sep 2022 08:00), Max: 36.9 (07 Sep 2022 04:00)  T(F): 97.5 (07 Sep 2022 08:00), Max: 98.4 (07 Sep 2022 04:00)  HR: 66 (07 Sep 2022 08:00) (66 - 86)  BP: --  BP(mean): --  RR: 14 (07 Sep 2022 08:00) (6 - 17)  SpO2: 100% (07 Sep 2022 08:00) (99% - 100%)    Parameters below as of 07 Sep 2022 08:00  Patient On (Oxygen Delivery Method): ventilator,peep 5    O2 Concentration (%): 40      Assessment:  71F PMH DM, COPD, Chronic Adrenal Insufficiency on Chronic prednisone, history of colorectal cancer s/p resection (colostomy bag), Hx of CAD, Chronic A fib on Eliquis, and tracheomalacia and multiple intubations, recent dx of OM presented to ED at [facility] this morning with epigastric pain, belching and central chest pain. Found on CTA to have type A aortic dissection and transferred to [facility] for surgical evaluation by Dr. Cabrera.     Ascending aortic dissection s/p modified bentall and hemiarch on 9/6   Hemodynamic instability  Cardiogenic shock   Hypovolemic shock   Lactic acidosis  Post op respiratory insufficiency  Acute blood loss anemia  Stress hyperglycemia     Plan:   ***Neuro***  Sedated with [x] Precedex and [x] Diprivan   Post operative neuro assessment     ***Cardiovascular***  Invasive hemodynamic monitoring, assess perfusion indices   PA  / CVP 13 / MAP 84 / Hct 25.4 / Lactate 2.2   [x] Temporary pacing - VVI paced at 60   [x] Primacor 0.4mcg [x] Cardene weaned off overnight   Continuos reassessment of hemodynamics   Mexiletine for hx of afib  Monitor chest tube outputs     Serial EKG and cardiac enzymes     ***Pulmonary***  Post op vent management / PS vent weaning as tolerated   Titration of FiO2 and PEEP, follow SpO2, CXR, blood gasses   Hx of COPD / Continue bronchodilators and Solucortef     Mode: AC/ CMV (Assist Control/ Continuous Mandatory Ventilation)  RR (machine): 14  TV (machine): 500  FiO2: 50  PEEP: 10  ITime: 1  MAP: 14  PIP: 28              ***GI***  [x] NPO   [x] Protonix    Reglan for gut motility     ***Renal***  Continue to monitor I/Os, BUN/Creatinine.   Replete lytes PRN  Amezcua present      ***ID***  Perioperative coverage with Vancomycin     ***Endocrine***  [x]  DM : HbA1c 9.4%                - [x] Insulin gtt                - Need tight glycemic control to prevent wound infection.         Patient requires continuous monitoring with bedside rhythm monitoring, pulse oximetry monitoring, and continuous central venous and arterial pressure monitoring; and intermittent blood gas analysis. Care plan discussed with the ICU care team.   Patient remained critical, at risk for life threatening decompensation.    I have spent 30 minutes providing critical care management to this patient.    By signing my name below, I, Emelia Felipe, attest that this documentation has been prepared under the direction and in the presence of Bj Abbott MD   Electronically signed: Georgi Burleson, 09-07-22 @ 08:44    I, Bj Abbott, personally performed the services described in this documentation. all medical record entries made by the marcyibronaldo were at my direction and in my presence. I have reviewed the chart and agree that the record reflects my personal performance and is accurate and complete  Electronically signed: Bj Abbott MD

## 2022-09-07 NOTE — DISCHARGE NOTE PROVIDER - NSDCFUADDAPPT_GEN_ALL_CORE_FT
Follow up: Please follow up with Dr. Pepito Valenzuela within 1 week of discharge from the hospital, please call [phone] for appointment and discuss that you recently were seen in the hospital.  Wound Care: Please apply santyl to the RF wounds followed by 4x4 gauze, yesenia and ACE daily.   Weight bearing: Please use Z flows at all times while in bed.

## 2022-09-07 NOTE — PROVIDER CONTACT NOTE (OTHER) - SITUATION
PATIENT S/P EMERGENT AORTIC DISSECTION REPAIR/ BENTALL PROCEDURE FROM 9/6/2022. PATIENT ADMITTED WITH ADMIT WITH R LATERAL FOOT AND R LATERAL ANKLE WOUNDS REQUIRING OUTPATIENT TREATMENT.

## 2022-09-07 NOTE — BRIEF OPERATIVE NOTE - BRIEF OP NOTE DRAINS
Right on chest wall: Mediastinal chest tube along graft  Mid on chest wall: Substernal chest tube  Left on chest wall: Diaphragmatic chest tube

## 2022-09-07 NOTE — DISCHARGE NOTE PROVIDER - DETAILS OF MALNUTRITION DIAGNOSIS/DIAGNOSES
This patient has been assessed with a concern for Malnutrition and was treated during this hospitalization for the following Nutrition diagnosis/diagnoses:     -  09/27/2022: Moderate protein-calorie malnutrition

## 2022-09-07 NOTE — DISCHARGE NOTE PROVIDER - NSDCPNSUBOBJ_GEN_ALL_CORE
PHYSICAL EXAM:  Neurology: alert and oriented x 3, nonfocal, no gross deficits  CV : S1S2  Sternal Wound :  CDI , Stable  Lungs: cta  Abdomen: soft, nontender, nondistended, positive bowel sounds, last bowel movement      11/19   Extremities:     tr edema, no calf tendenress right foot dsg  right elbow w/ aleven dressing - vac d/c'd

## 2022-09-07 NOTE — DISCHARGE NOTE PROVIDER - NSDCMRMEDTOKEN_GEN_ALL_CORE_FT
Admelog 100 units/mL injectable solution: 9 unit(s) injectable 3 times a day (before meals)  apixaban 5 mg oral tablet: 1 tab(s) orally every 12 hours  ascorbic acid 500 mg oral tablet: 1 tab(s) orally once a day  budesonide-formoterol 160 mcg-4.5 mcg/inh inhalation aerosol: 2 puff(s) inhaled 2 times a day  Crestor 5 mg oral tablet: 1 tab(s) orally once a day (at bedtime)  dilTIAZem 60 mg oral tablet: 1 tab(s) orally 2 times a day  ertapenem 1 g injection: 1 gram(s) intravenous once a day  fluticasone 50 mcg/inh nasal spray: 1 spray(s) nasal 2 times a day  hydrOXYzine hydrochloride 25 mg oral tablet: 1 tab(s) orally 2 times a day, As needed, Itching x 7 days  insulin glargine 100 units/mL subcutaneous solution: 15 unit(s) subcutaneous once a day (at bedtime)  ipratropium-albuterol 0.5 mg-2.5 mg/3 mL inhalation solution: 3 milliliter(s) inhaled every 6 hours  ketoconazole 2% topical cream: Apply topically to affected area 2 times a day   lactulose 10 g/15 mL oral syrup: 22.5 milliliter(s) orally once a day  lisinopril 5 mg oral tablet: 1 tab(s) orally once a day  methylPREDNISolone 8 mg oral tablet: 1 tab(s) orally once a day  mexiletine 200 mg oral capsule: 1 cap(s) orally 3 times a day  Multiple Vitamins oral tablet: 1 tab(s) orally once a day  oxycodone-acetaminophen 5 mg-325 mg oral tablet: 1 tab(s) orally every 6 hours, As needed, Severe Pain (7 - 10)  pantoprazole 40 mg oral delayed release tablet: 1 tab(s) orally once a day (before a meal)  polyethylene glycol 3350 oral powder for reconstitution: 17 gram(s) orally 2 times a day  pregabalin 150 mg oral capsule: 1 cap(s) orally 2 times a day  senna oral tablet: 2 tab(s) orally once a day (at bedtime)  Spiriva 18 mcg inhalation capsule: 1 cap(s) inhaled once a day  vancomycin 1 g intravenous injection: 1 gram(s) intravenous every 12 hours  zinc sulfate 220 mg oral capsule: 1 cap(s) orally once a day   acetaminophen 160 mg/5 mL oral suspension: 20.31 milliliter(s) orally every 6 hours, As needed, Temp greater or equal to 38C (100.4F), Mild Pain (1 - 3)  acetylcysteine 10% inhalation solution: 4 milliliter(s) inhaled every 12 hours  apixaban 5 mg oral tablet: 1 tab(s) orally every 12 hours  ascorbic acid 500 mg oral tablet: 1 tab(s) orally once a day  budesonide: 0.5 milligram(s) inhaled 2 times a day  calamine topical lotion: 1 application topically every 8 hours, As needed, Itching  collagenase 250 units/g topical ointment: 1 application topically once a day  Crestor 5 mg oral tablet: 1 tab(s) orally once a day (at bedtime)  diphenhydramine-zinc acetate 2%-0.1% topical cream: 1 application topically every 8 hours  fluconazole 200 mg oral tablet: 2 tab(s) orally once a day  hydrALAZINE 50 mg oral tablet: 1 tab(s) orally 3 times a day  insulin glargine 100 units/mL subcutaneous solution: 8 unit(s) subcutaneous once a day (at bedtime)  insulin lispro 100 units/mL injectable solution: 7 unit(s) injectable 3 times a day (before meals)  ipratropium-albuterol 0.5 mg-2.5 mg/3 mL inhalation solution: 3 milliliter(s) inhaled every 6 hours  magnesium oxide 400 mg oral tablet: 1 tab(s) orally every 8 hours  methylPREDNISolone 8 mg oral tablet: 1 tab(s) orally once a day  metoprolol: 12.5 milligram(s) orally 2 times a day  mexiletine 200 mg oral capsule: 1 cap(s) orally every 8 hours  Multiple Vitamins oral tablet: 1 tab(s) orally once a day  nystatin 100,000 units/g topical powder: 1 application topically 2 times a day  pantoprazole 40 mg oral delayed release tablet: 1 tab(s) orally once a day (before a meal)  sulfamethoxazole-trimethoprim 400 mg-80 mg oral tablet: 1 tab(s) orally once a day

## 2022-09-07 NOTE — PROVIDER CONTACT NOTE (OTHER) - ASSESSMENT
PATIENT S/P EMERGENT AORTIC DISSECTION REPAIR/ BENTALL PROCEDURE FROM 9/6/2022. PATIENT ADMITTED WITH ADMIT WITH R LATERAL FOOT AND R LATERAL ANKLE WOUNDS REQUIRING OUTPATIENT TREATMENT. WOUNDS + MRSA FROM JULY 2022 CULTURE. WOUNDS ARE ASSESSED AND MEASURED BY REGISTERED NURSE, SEE A&I FOR MEASUREMENTS. PODIATRY CONSULT PLACED BY CTU TEAM FOR ASSESSMENT. PODIATRY CONSULT ASSESSES WOUNDS AND DISCUSSES PROPER TREATMENT AND DRESSINGS FOR PATIENT.

## 2022-09-07 NOTE — DISCHARGE NOTE PROVIDER - NSDCFUADDINST_GEN_ALL_CORE_FT
wound care:  right elbow pressure ulcer - aquacell- w/ Allevyn q 24h  sacrum IAD  right medical foot/ankle ulcers- collagenase w/ allevyn q24h  L heel DTI - Cavilon

## 2022-09-07 NOTE — PROVIDER CONTACT NOTE (OTHER) - ASSESSMENT
UPON INITIAL ASSESSMENT ON 9/7/22 @ 0800, IT IS NOTED PATIENT HAS HEALED PRESSURE ULCER ON SACRUM. IT IS NOTED THERE IS SCAR TISSUE WITH THICK SKIN OVER SCARRING AND NO EVIDENCE OR OPEN AREAS ON SACRUM. AREA IS NOT REDDENNED. WOUND CARE CONSULT PLACED TO ASSESS. WOUND CARE ASSESS PATIENT AND AGREES THERE IS NO EVIDENCE OF PRESSURE INJURY TO SKIN. MD DOMINGO AND SKIN CHAMPION VIOLET KNOWLES AND NURSE MANAGER JC VILLEGAS MADE AWARE. NO FURTHER TREATMENT AT THIS TIME. PATIENT CONTINUES TO BE TURNED AND POSITIONED Q2H TO CONTINUE TO PROMOTE HEALTHY SKIN AND REDUCE RISK OF SKIN INJURY. UPON INITIAL ASSESSMENT ON 9/7/22 @ 0800, IT IS NOTED PATIENT IS ADMITTED WITH HEALED PRESSURE ULCER ON SACRUM. IT IS NOTED THERE IS SCAR TISSUE WITH THICK SKIN OVER SCARRING AND NO EVIDENCE OR OPEN AREAS ON SACRUM. AREA IS NOT REDDENNED. WOUND CARE CONSULT PLACED TO ASSESS. WOUND CARE ASSESS PATIENT AND AGREES THERE IS NO EVIDENCE OF PRESSURE INJURY TO SKIN. MD DOMINGO AND SKIN CHAMPION VIOLET KNOWLES AND NURSE MANAGER JC VILLEGAS MADE AWARE. NO FURTHER TREATMENT AT THIS TIME. PATIENT CONTINUES TO BE TURNED AND POSITIONED Q2H TO CONTINUE TO PROMOTE HEALTHY SKIN AND REDUCE RISK OF SKIN INJURY.
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
## 2022-09-07 NOTE — PROGRESS NOTE ADULT - SUBJECTIVE AND OBJECTIVE BOX
RAYRAY RODRIGUEZ  MRN-[id number]  Patient is a 71y old  Female who presents with a chief complaint of Type A aortic dissection (07 Sep 2022 04:17)    HPI:  71F PMH DM, COPD, Chronic Adrenal Insufficiency on Chronic prednisone, history of colorectal cancer s/p resection (colostomy bag), Hx of CAD, Chronic A fib on Eliquis, and tracheomalacia and multiple intubations, recent dx of OM presented to ED at [facility] this morning with epigastric pain, belching and central chest pain. Found on CTA to have type A aortic dissection and transferred to [facility] for surgical evaluation by Dr. Cabrera. (06 Sep 2022 16:32)      Surgery/Hospital course:    Today: s/p Bental (t) , aliyah arch.   intubated, with acute bleeding in OR and now in ICU.   pt bleeding factors corrected, fieba, k centra given  Chest tube with less output post transfusion.   pt sedated at present. will wean sedation and assess neuro status.       REVIEW OF SYSTEMS:  unable to assess     Physical Exam:  Vital Signs Last 24 Hrs  T(C): 36.9 (07 Sep 2022 04:00), Max: 36.9 (07 Sep 2022 04:00)  T(F): 98.4 (07 Sep 2022 04:00), Max: 98.4 (07 Sep 2022 04:00)  HR: 80 (07 Sep 2022 05:00) (80 - 80)  BP: --  BP(mean): --  RR: 14 (07 Sep 2022 05:00) (7 - 17)  SpO2: 99% (07 Sep 2022 05:00) (99% - 100%)    Parameters below as of 07 Sep 2022 04:15  Patient On (Oxygen Delivery Method): ventilator    O2 Concentration (%): 40    Gen:  sedated   CNS:  sedated   Neck: no JVD  RES : clear , no wheezing                      CVS: Regular  rhythm. Normal S1/S2  Abd: Soft, non-distended. Bowel sounds present.  Skin: No rash.  Ext:  no edema    ============================I/O===========================   I&O's Detail    06 Sep 2022 07:01  -  07 Sep 2022 05:08  --------------------------------------------------------  IN:    Cryoprecipitate: 150 mL    Dexmedetomidine: 34 mL    Insulin: 19 mL    IV PiggyBack: 400 mL    IV PiggyBack: 100 mL    Milrinone: 51 mL    NiCARdipine: 75 mL    Plasma: 600 mL    Platelets - Single Donor: 225 mL    PRBCs (Packed Red Blood Cells): 300 mL    Propofol: 16.4 mL    sodium chloride 0.9%: 50 mL  Total IN: 2020.4 mL    OUT:    Chest Tube (mL): 230 mL    Chest Tube (mL): 270 mL    Indwelling Catheter - Urethral (mL): 365 mL  Total OUT: 865 mL    Total NET: 1155.4 mL        ============================ LABS =========================                        8.0    11.79 )-----------( 224      ( 07 Sep 2022 02:43 )             25.4     09-07    144  |  107  |  21  ----------------------------<  272<H>  4.7   |  27  |  0.70    Ca    8.4      07 Sep 2022 02:45  Phos  3.4     09-07  Mg     3.4     09-07    TPro  5.5<L>  /  Alb  3.6  /  TBili  1.2  /  DBili  x   /  AST  55<H>  /  ALT  26  /  AlkPhos  57  09-07    LIVER FUNCTIONS - ( 07 Sep 2022 02:45 )  Alb: 3.6 g/dL / Pro: 5.5 g/dL / ALK PHOS: 57 U/L / ALT: 26 U/L / AST: 55 U/L / GGT: x           PT/INR - ( 07 Sep 2022 02:43 )   PT: 12.5 sec;   INR: 1.09 ratio         PTT - ( 07 Sep 2022 02:43 )  PTT:25.9 sec  ABG - ( 07 Sep 2022 02:37 )  pH, Arterial: 7.36  pH, Blood: x     /  pCO2: 50    /  pO2: 171   / HCO3: 28    / Base Excess: 2.0   /  SaO2: 97.9                ======================Micro/Rad/Cardio=================  Culture: Reviewed   CXR: Reviewed  Echo:Reviewed  ======================================================  PAST MEDICAL & SURGICAL HISTORY:  Atrial fibrillation  paroxysmal, on eliquis      Diabetes  Type 2      COPD (chronic obstructive pulmonary disease)      Adrenal insufficiency  Medrol daily for over 50 years      Aortic insufficiency  moderate AR on echo 5/3/2018      Pelvic fracture      Asthma      Tracheobronchomalacia  diagnosed 2015, s/p bronchial thermoplasty 2016 (Dr Zapien); recent bronchoscopy 6/5/2018 revealed no evidence of tracheobronchomalacia in trachea or bronchial tubes      Colorectal cancer  4/2018- last treatment , chemo and radiation      Rectal bleeding      Seizure  x 1 1/7/18      DVT (deep venous thrombosis)  15-20 years ago, took coumadin      TIA (transient ischemic attack)  multiple, last 5 years ago - presents as right-sided weakness      History of partial hysterectomy  30 years ago - fibroids      H/O total knee replacement, bilateral  5 years ago      History of sinus surgery  multiple sinus surgeries      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      H/O pelvic surgery  5 years ago - s/p fracture      History of tracheomalacia  2015 - attempted tracheal stenting ([facility])- course complicated by obstruction, respiratory failure, multiple CPR attempts -  stent discontinued; 10/20/2016 Tracheobronchoplasty (Prolene Mesh) performed at [facility] by Dr Zapien      S/P bronchoscopy  6/5/2018 - [place] (Dr Zapien) no evidence of tracheobronchomalacia in trachea or bronchial tubes      Rectal bleeding  exam under anesthesia ([facility]) 2/2018        ====================ASSESSMENT ==============  71 yr female s/p Bental (t), aliyah arch replacement. POD 1    ====================== NEUROLOGY=====================  dexMEDEtomidine Infusion 0.4 MICROgram(s)/kG/Hr (6.77 mL/Hr) IV Continuous <Continuous>  metoclopramide  IVPB 10 milliGRAM(s) IV Intermittent every 8 hours  propofol Infusion 36.928 MICROgram(s)/kG/Min (15 mL/Hr) IV Continuous <Continuous>      wean sedation, to assess status.    ==================== RESPIRATORY======================  Mechanical Ventilation:  Mode: AC/ CMV (Assist Control/ Continuous Mandatory Ventilation)  RR (machine): 14  TV (machine): 500  FiO2: 50  PEEP: 10  ITime: 1  MAP: 14  PIP: 28    albuterol/ipratropium for Nebulization 3 milliLiter(s) Nebulizer every 6 hours  buDESOnide    Inhalation Suspension 0.5 milliGRAM(s) Inhalation every 12 hours    stress dose steroids. home predisone for long term use for copd  started on duonebs and pulmcort for copd    ====================CARDIOVASCULAR==================  mexiletine 200 milliGRAM(s) Oral every 8 hours  milrinone Infusion 0.5 MICROgram(s)/kG/Min (10.2 mL/Hr) IV Continuous <Continuous>  niCARdipine Infusion 5 mG/Hr (25 mL/Hr) IV Continuous <Continuous>    Close bp control   cont rv support with primacor   mexiletine for hx of afib, presently being v paced, for slow afib       ===================HEMATOLOGIC/ONC ===================  transfuse for h/H >8/24   coags corrected.   dvt prophylaxis today       ===================== RENAL =========================  Continue monitoring urine output    trend creatine./   monitor lytes keep K> 4       ==================== GASTROINTESTINAL===================  pantoprazole  Injectable 40 milliGRAM(s) IV Push daily  potassium chloride  10 mEq/50 mL IVPB 10 milliEquivalent(s) IV Intermittent every 1 hour  potassium chloride  10 mEq/50 mL IVPB 10 milliEquivalent(s) IV Intermittent every 1 hour  potassium chloride  10 mEq/50 mL IVPB 10 milliEquivalent(s) IV Intermittent every 1 hour  sodium chloride 0.9%. 1000 milliLiter(s) (10 mL/Hr) IV Continuous <Continuous>    =======================    ENDOCRINE  =====================  dextrose 50% Injectable 50 milliLiter(s) IV Push every 15 minutes  dextrose 50% Injectable 25 milliLiter(s) IV Push every 15 minutes  hydrocortisone sodium succinate Injectable 50 milliGRAM(s) IV Push every 8 hours  insulin regular Infusion 3 Unit(s)/Hr (3 mL/Hr) IV Continuous <Continuous>    ========================INFECTIOUS DISEASE================  vancomycin  IVPB 1000 milliGRAM(s) IV Intermittent every 8 hours      Patient requires continuous monitoring with bedside rhythm monitoring, pulse oximetry, ventilator/ bipap  monitoring and intermittent blood gas analysis.    Care plan discussed with ICU care team.    Patient remained critical; required more than usual care; I have spent 35 minutes providing non-routine care, revaluated multiple times during the day.

## 2022-09-07 NOTE — CONSULT NOTE ADULT - SUBJECTIVE AND OBJECTIVE BOX
Podiatry pager #: [phone]    Patient is a 71y old  Female who presents with a chief complaint of Type A aortic dissection (07 Sep 2022 21:04)      HPI:  71F PMH DM, COPD, Chronic Adrenal Insufficiency on Chronic prednisone, history of colorectal cancer s/p resection (colostomy bag), Hx of CAD, Chronic A fib on Eliquis, and tracheomalacia and multiple intubations, recent dx of OM presented to ED at [facility] this morning with epigastric pain, belching and central chest pain. Found on CTA to have type A aortic dissection and transferred to [facility] for surgical evaluation by Dr. Cabrera. (06 Sep 2022 16:32)      PAST MEDICAL & SURGICAL HISTORY:  Atrial fibrillation  paroxysmal, on eliquis      Diabetes  Type 2      COPD (chronic obstructive pulmonary disease)      Adrenal insufficiency  Medrol daily for over 50 years      Aortic insufficiency  moderate AR on echo 5/3/2018      Pelvic fracture      Asthma      Tracheobronchomalacia  diagnosed 2015, s/p bronchial thermoplasty 2016 (Dr Zapien); recent bronchoscopy 6/5/2018 revealed no evidence of tracheobronchomalacia in trachea or bronchial tubes      Colorectal cancer  4/2018- last treatment , chemo and radiation      Rectal bleeding      Seizure  x 1 1/7/18      DVT (deep venous thrombosis)  15-20 years ago, took coumadin      TIA (transient ischemic attack)  multiple, last 5 years ago - presents as right-sided weakness      History of partial hysterectomy  30 years ago - fibroids      H/O total knee replacement, bilateral  5 years ago      History of sinus surgery  multiple sinus surgeries      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 by Dr Zapien      S/P bronchoscopy  6/5/2018 - [place] (Dr Zapien) no evidence of tracheobronchomalacia in trachea or bronchial tubes      Rectal bleeding  exam under anesthesia ([facility]) 2/2018          MEDICATIONS  (STANDING):  acetylcysteine 10%  Inhalation 4 milliLiter(s) Inhalation every 6 hours  albuterol/ipratropium for Nebulization 3 milliLiter(s) Nebulizer every 6 hours  buDESOnide    Inhalation Suspension 0.5 milliGRAM(s) Inhalation every 12 hours  chlorhexidine 0.12% Liquid 15 milliLiter(s) Oral Mucosa every 12 hours  chlorhexidine 0.12% Liquid 5 milliLiter(s) Oral Mucosa two times a day  chlorhexidine 2% Cloths 1 Application(s) Topical <User Schedule>  collagenase Ointment 1 Application(s) Topical daily  dexMEDEtomidine Infusion 0.4 MICROgram(s)/kG/Hr (6.77 mL/Hr) IV Continuous <Continuous>  dextrose 50% Injectable 50 milliLiter(s) IV Push every 15 minutes  dextrose 50% Injectable 25 milliLiter(s) IV Push every 15 minutes  hydrocortisone sodium succinate Injectable 50 milliGRAM(s) IV Push every 8 hours  insulin regular Infusion 3 Unit(s)/Hr (3 mL/Hr) IV Continuous <Continuous>  metoclopramide  IVPB 10 milliGRAM(s) IV Intermittent every 8 hours  mexiletine 200 milliGRAM(s) Oral every 8 hours  milrinone Infusion 0.4 MICROgram(s)/kG/Min (8.12 mL/Hr) IV Continuous <Continuous>  niCARdipine Infusion 5 mG/Hr (25 mL/Hr) IV Continuous <Continuous>  pantoprazole  Injectable 40 milliGRAM(s) IV Push daily  potassium chloride  10 mEq/50 mL IVPB 10 milliEquivalent(s) IV Intermittent every 1 hour  potassium chloride  10 mEq/50 mL IVPB 10 milliEquivalent(s) IV Intermittent every 1 hour  potassium chloride  10 mEq/50 mL IVPB 10 milliEquivalent(s) IV Intermittent every 1 hour  propofol Infusion 36.928 MICROgram(s)/kG/Min (15 mL/Hr) IV Continuous <Continuous>  sodium chloride 0.9%. 1000 milliLiter(s) (10 mL/Hr) IV Continuous <Continuous>  vancomycin  IVPB 1000 milliGRAM(s) IV Intermittent every 12 hours    MEDICATIONS  (PRN):      Allergies    aspirin (Short breath)  Avelox (Short breath; Pruritus)  cefepime (Anaphylaxis)  codeine (Short breath)  Dilaudid (Short breath)  iodine (Short breath; Swelling)  penicillin (Anaphylaxis)  shellfish (Anaphylaxis)  tetanus toxoid (Short breath)  Valium (Short breath)    Intolerances        VITALS:    Vital Signs Last 24 Hrs  T(C): 37.8 (07 Sep 2022 20:00), Max: 37.8 (07 Sep 2022 20:00)  T(F): 100 (07 Sep 2022 20:00), Max: 100 (07 Sep 2022 20:00)  HR: 67 (07 Sep 2022 21:07) (59 - 92)  BP: --  BP(mean): --  RR: 23 (07 Sep 2022 21:07) (6 - 31)  SpO2: 98% (07 Sep 2022 21:07) (96% - 100%)    Parameters below as of 07 Sep 2022 21:07  Patient On (Oxygen Delivery Method): mask, aerosol  O2 Flow (L/min): 10  O2 Concentration (%): 40    LABS:                          8.0    11.79 )-----------( 224      ( 07 Sep 2022 02:43 )             25.4       09-07    144  |  107  |  21  ----------------------------<  272<H>  4.7   |  27  |  0.70    Ca    8.4      07 Sep 2022 02:45  Phos  3.4     09-07  Mg     3.4     09-07    TPro  5.5<L>  /  Alb  3.6  /  TBili  1.2  /  DBili  x   /  AST  55<H>  /  ALT  26  /  AlkPhos  57  09-07      CAPILLARY BLOOD GLUCOSE  120 (07 Sep 2022 19:00)  122 (07 Sep 2022 18:00)  132 (07 Sep 2022 17:00)  130 (07 Sep 2022 16:00)  145 (07 Sep 2022 15:00)  160 (07 Sep 2022 14:00)  131 (07 Sep 2022 13:00)  123 (07 Sep 2022 12:00)  106 (07 Sep 2022 11:00)  91 (07 Sep 2022 10:30)  94 (07 Sep 2022 10:00)  127 (07 Sep 2022 09:00)  151 (07 Sep 2022 08:00)  162 (07 Sep 2022 07:00)  206 (07 Sep 2022 06:00)  211 (07 Sep 2022 05:00)  246 (07 Sep 2022 04:00)  276 (07 Sep 2022 03:00)  269 (07 Sep 2022 02:00)  180 (07 Sep 2022 01:00)      POCT Blood Glucose.: 104 mg/dL (07 Sep 2022 20:55)  POCT Blood Glucose.: 112 mg/dL (07 Sep 2022 19:53)  POCT Blood Glucose.: 120 mg/dL (07 Sep 2022 18:44)  POCT Blood Glucose.: 122 mg/dL (07 Sep 2022 18:02)  POCT Blood Glucose.: 132 mg/dL (07 Sep 2022 16:32)  POCT Blood Glucose.: 130 mg/dL (07 Sep 2022 16:17)  POCT Blood Glucose.: 145 mg/dL (07 Sep 2022 14:54)  POCT Blood Glucose.: 160 mg/dL (07 Sep 2022 13:54)  POCT Blood Glucose.: 131 mg/dL (07 Sep 2022 12:50)  POCT Blood Glucose.: 123 mg/dL (07 Sep 2022 12:06)  POCT Blood Glucose.: 106 mg/dL (07 Sep 2022 11:12)  POCT Blood Glucose.: 91 mg/dL (07 Sep 2022 10:30)  POCT Blood Glucose.: 94 mg/dL (07 Sep 2022 10:01)  POCT Blood Glucose.: 127 mg/dL (07 Sep 2022 08:57)  POCT Blood Glucose.: 151 mg/dL (07 Sep 2022 08:14)  POCT Blood Glucose.: 162 mg/dL (07 Sep 2022 07:10)  POCT Blood Glucose.: 206 mg/dL (07 Sep 2022 05:51)  POCT Blood Glucose.: 211 mg/dL (07 Sep 2022 04:59)  POCT Blood Glucose.: 246 mg/dL (07 Sep 2022 04:07)  POCT Blood Glucose.: 276 mg/dL (07 Sep 2022 03:05)  POCT Blood Glucose.: 269 mg/dL (07 Sep 2022 02:04)      PT/INR - ( 07 Sep 2022 02:43 )   PT: 12.5 sec;   INR: 1.09 ratio         PTT - ( 07 Sep 2022 02:43 )  PTT:25.9 sec    LOWER EXTREMITY PHYSICAL EXAM:    Vascular: DP/PT 2/4 B/L, CFT <3 seconds B/L, Temperature gradient warm to cool B/L  Neuro: Epicritic sensation intact to the level of midfoot B/L  Musculoskeletal/Ortho: unremarkable   Skin: right foot with two navicular wounds with fibrous wound bed, no clinical signs of infection. L foot with no clinical signs of infection.     RADIOLOGY & ADDITIONAL STUDIES:

## 2022-09-07 NOTE — PROGRESS NOTE ADULT - SUBJECTIVE AND OBJECTIVE BOX
Patient seen and examined at the bedside.    Remained critically ill on continuous ICU monitoring.    OBJECTIVE:  Vital Signs Last 24 Hrs  T(C): 37.8 (07 Sep 2022 20:00), Max: 37.8 (07 Sep 2022 20:00)  T(F): 100 (07 Sep 2022 20:00), Max: 100 (07 Sep 2022 20:00)  HR: 67 (07 Sep 2022 20:30) (59 - 92)  BP: --  BP(mean): --  RR: 16 (07 Sep 2022 20:30) (6 - 31)  SpO2: 98% (07 Sep 2022 20:30) (96% - 100%)    Parameters below as of 07 Sep 2022 20:00  Patient On (Oxygen Delivery Method): mask, aerosol    O2 Concentration (%): 40      Physical Exam:   General: NAD   Neurology: nonfocal   Eyes: bilateral pupils equal and reactive   ENT/Neck: Neck supple, trachea midline, No JVD   Respiratory: Clear bilaterally   CV: S1S2, no murmurs        [x] Sternal dressing, [x] Mediastinal CT, [x] Pleural CT [x] Bulb         [x] Sinus rhythm, [x] Afib, [x] Temporary pacing, [x] PPM  Abdominal: Soft, NT, ND +BS   Extremities: 1-2+ pedal edema noted, + peripheral pulses   Skin: No Rashes, Hematoma, Ecchymosis                           Assessment:  71F PMH DM, COPD, Chronic Adrenal Insufficiency on Chronic prednisone, history of colorectal cancer s/p resection (colostomy bag), Hx of CAD, Chronic A fib on Eliquis, and tracheomalacia and multiple intubations, recent dx of OM presented to ED at [facility] this morning with epigastric pain, belching and central chest pain. Found on CTA to have type A aortic dissection and transferred to [facility] for surgical evaluation by Dr. Cabrera.     Ascending aortic dissection s/p modified bentall and hemiarch on 9/6   Hemodynamic instability  Cardiogenic shock   Hypovolemic shock   Lactic acidosis  Post op respiratory insufficiency  Acute blood loss anemia  Stress hyperglycemia     Plan:   ***Neuro***  Sedated with [x] Precedex and [x] Diprivan   Post operative neuro assessment     ***Cardiovascular***  Invasive hemodynamic monitoring, assess perfusion indices   DE  / CVP 12 / MAP 69 / Hct 25.4 / Lactate 1.6  [x] Temporary pacing - VVI paced at 60   [x] weaned off of primacor [x] Cardene 5mg   Continuos reassessment of hemodynamics   Mexiletine for hx of afib  Monitor chest tube outputs     Serial EKG and cardiac enzymes     ***Pulmonary***  Extubated at 4PM, tolerating CPAP   Titration of FiO2 and PEEP, follow SpO2, CXR, blood gasses   Hx of COPD / Continue bronchodilators and Solucortef               ***GI***  [x] NPO   [x] Protonix    Reglan for gut motility     ***Renal***  Continue to monitor I/Os, BUN/Creatinine.   Replete lytes PRN  Amezcua present      ***ID***  Perioperative coverage with Vancomycin     ***Endocrine***  [x]  DM : HbA1c 9.4%                - [x] Insulin gtt                - Need tight glycemic control to prevent wound infection.      Patient requires continuous monitoring with bedside rhythm monitoring, pulse oximetry monitoring, and continuous central venous and arterial pressure monitoring; and intermittent blood gas analysis. Care plan discussed with the ICU care team.   Patient remained critical, at risk for life threatening decompensation.    I have spent 40 minutes providing critical care management to this patient.    By signing my name below, I, Megan Guerra, attest that this documentation has been prepared under the direction and in the presence of Saleem Rico NP   Electronically signed: Georgi Luna, 09-07-22 @ 21:05    I, Saleem Rico NP , personally performed the services described in this documentation. all medical record entries made by the scribe were at my direction and in my presence. I have reviewed the chart and agree that the record reflects my personal performance and is accurate and complete  Electronically signed: Saleem Rico NP

## 2022-09-07 NOTE — DISCHARGE NOTE PROVIDER - NSDCFUSCHEDAPPT_GEN_ALL_CORE_FT
Huma Gray  [facility]  WOUNDCARE [address]  Scheduled Appointment: 09/15/2022    [facility] [address]  Scheduled Appointment: 09/29/2022    Eulalio Allison  [facility] [address]  Scheduled Appointment: 11/02/2022    Rosa Isela Recinos  [facility]  VASCULAR [address]  Scheduled Appointment: 12/06/2022     Rosa Isela Recinos  [facility]  VASCULAR [address]  Scheduled Appointment: 12/21/2022    Genesis Aragon  [facility]  ELECTROPH [id number]  Scheduled Appointment: 01/26/2023    Rico Glover  [facility]  CARDIOLOGY [phone]  Scheduled Appointment: 01/26/2023

## 2022-09-07 NOTE — PATIENT PROFILE ADULT - FALL HARM RISK - HARM RISK INTERVENTIONS
Assistance with ambulation/Assistance OOB with selected safe patient handling equipment/Communicate Risk of Fall with Harm to all staff/Discuss with provider need for PT consult/Monitor gait and stability/Provide patient with walking aids - walker, cane, crutches/Reinforce activity limits and safety measures with patient and family/Sit up slowly, dangle for a short time, stand at bedside before walking/Tailored Fall Risk Interventions/Use of alarms - bed, chair and/or voice tab/Visual Cue: Yellow wristband and red socks/Bed in lowest position, wheels locked, appropriate side rails in place/Call bell, personal items and telephone in reach/Instruct patient to call for assistance before getting out of bed or chair/Non-slip footwear when patient is out of bed/[place] to call system/Physically safe environment - no spills, clutter or unnecessary equipment/Purposeful Proactive Rounding/Room/bathroom lighting operational, light cord in reach

## 2022-09-07 NOTE — PATIENT PROFILE ADULT - OVER THE PAST TWO WEEKS HAVE YOU FELT DOWN, DEPRESSED OR HOPELESS?
UTI  · Drink eight or more glasses of water every day.   · Avoid caffeine and alcohol during treatment.   · AZO as needed to help with the bladder spasms (sold over the counter)   · Probiotics or yogurt daily to help build back the good bacteria in your gut.  · To avoid UTIs in the future:  Wipe front to back after a bowel movement, drink lots of water, urinate after sex, don't postpone the urge to void, cotton underwear, avoid tight fitting clothing  · If a urine culture is sent, we will call you with test results in 48-72 hours.  · If symptoms persist or worsen, follow up with your primary care provider or Urgent Care  · Go to ER for fever, persistent vomiting, abdominal pain, back or flank pain.       patient is intubated at this time

## 2022-09-07 NOTE — DISCHARGE NOTE PROVIDER - CARE PROVIDER_API CALL
Tan Cabrera)  Thoracic and Cardiac Surgery  [address] [place] [address]  Phone: [phone]  Fax: [phone]  Follow Up Time:

## 2022-09-07 NOTE — DISCHARGE NOTE PROVIDER - HOSPITAL COURSE
71 year-old female with a history of DM2, CAD, A-Fib on apixaban, Severe Persistent Asthma (on chronic steroids, recently started on Tezspire), colon cancer s/p resection/chemo, and tracheobronchomalacia s/p tracheoplasty, and recent OM of the R foot s/b debridement and completed course of Vancomycin/Ertapenem for MRSA/ESBL Proteus/Corynebacterium who now presents with chest pain, found to have Type A dissection s/p repair with modified Bentall procedure and hemiarch replacement on 22. Post-op course complicated by acute hypoxemic respiratory failure, shock, anemia, and hyperglycemia requiring insulin gtt. Extubated , now awake and alert with mild encephalopathy but overall significantly improved.    Assessment:  Acute hypoxemic respiratory failure requiring intubation  Type A Aortic Dissection  Severe Persistent Asthma  History of Tracheobronchomalacia    Plan:  See below:  -**************************                                SEE Below:  -improving but weakness  9/15-ABX adjusted to ceftriaxone (LFTS)-PICU  -PICU, low grade temp-fever work up in progress, no resp decline or sx  -resp stable at present but tenuous  -for FEESST today, NGT in place w/TF  -s/p FEESST-passed, remains in PICU          -TF in place at 40cc, more OOB          -hypoglycemia noted and rx, no change in resp status  -vented/sedated-pressors/inotropes/ABX  -remains vented on nitrous/less O2 needs, improving LFTS                   -vented/semi sedated--less O2 needs  10/3-on vanco, weaning sedation/, all lines changed  10/6-no changes-now afebrile-on vanco   10/7-no weaning-remains off pressors  10/10-for trach, no weaning done               10/11-s/p trach-uneventful  10/12-TC and ;cpap done and tolerated well  10/13-weaning TC continues              10/14-mucus issues-TC continues  10/24-TC x mult days-stable-secretion issues remain           10/25-replaced on vent for secretions            10/26-TC in place, mult consults  10/27-no changes-TC continues    10/28-wound care-vac in place  10/31-TC in place, elbow wound vac in place  -resp stable, elbow vac changed--ABX as per ID     -no new issues over the day  11/3-no new events-on TC/OOB as able; ENT f/up for awake laryngoscopy  -ENT f/up, vest use-feels well                                  -no new events--await decannulation   -phonating well, wound vac right arm in place  -awaiting floor bed-board CICU                    11/10-CTU-vanco until -no new events                     -continued ambulation, TC remains  -stable over the day  -decannulated and phonating well, wants rehab  11/15-VSS no acute issues overnight, await swallow eval   VSS; RSR 70-80; npo w/ koafeed tf's; mbs today; diet advanced to puree diet w/ mild thick liquids; noctural tf; insulin adjusted as per H. endo as per diet changes; dental consult- pt removed own dentures 2 days ago and now c/o rt upper jaw/ mouth pain-->Traumatic ulcer due to denture prosthesis- salt water rinses recommended;   rt elbow vac changed today; abx as per ID- pt will need chronic suppression as per ID; bactrim and diflucan; d/c IV vanco today d/w ID    VSS - pt tolerating puree diet w/ moderately thickened liquids  will d/c nocturnal feeds & kaofeed   VSS; rsr 70-90; rt elbow vac change today; tolerating puree diet; ck covid pcr sun; rehab placement mon/ e next week    VSS: RSR ;  tolerating puree diet; ck covid pcr sun; rehab placement mon/ tue next week     d/c planning   to rehab   VSS vac d/c'd from right elbow - as wound granulated - aquacel w/ aleven applied by PT - PT will remove on Wednesday to assess drainage- d/c to rehab tomorrow   VSS- d/c to rehab  today - cleared by Dr. Cabrera 71 year-old female with a history of DM2, CAD, A-Fib on apixaban, Severe Persistent Asthma (on chronic steroids, recently started on Tezspire), colon cancer s/p resection/chemo, and tracheobronchomalacia s/p tracheoplasty, and recent OM of the R foot s/b debridement and completed course of Vancomycin/Ertapenem for MRSA/ESBL Proteus/Corynebacterium who now presents with chest pain, found to have Type A dissection s/p repair with modified Bentall procedure and hemiarch replacement on 22. Post-op course complicated by acute hypoxemic respiratory failure, shock, anemia, and hyperglycemia requiring insulin gtt. Extubated , now awake and alert with mild encephalopathy but overall significantly improved.    Assessment:  Acute hypoxemic respiratory failure requiring intubation  Type A Aortic Dissection  Severe Persistent Asthma  History of Tracheobronchomalacia    Plan:  See below:  -**************************                                SEE Below:  -improving but weakness  9/15-ABX adjusted to ceftriaxone (LFTS)-PICU  -PICU, low grade temp-fever work up in progress, no resp decline or sx  -resp stable at present but tenuous  -for FEESST today, NGT in place w/TF  -s/p FEESST-passed, remains in PICU          -TF in place at 40cc, more OOB          -hypoglycemia noted and rx, no change in resp status  -vented/sedated-pressors/inotropes/ABX  -remains vented on nitrous/less O2 needs, improving LFTS                   -vented/semi sedated--less O2 needs  10/3-on vanco, weaning sedation/, all lines changed  10/6-no changes-now afebrile-on vanco   10/7-no weaning-remains off pressors  10/10-for trach, no weaning done               10/11-s/p trach-uneventful  10/12-TC and ;cpap done and tolerated well  10/13-weaning TC continues              10/14-mucus issues-TC continues  10/24-TC x mult days-stable-secretion issues remain           10/25-replaced on vent for secretions            10/26-TC in place, mult consults  10/27-no changes-TC continues    10/28-wound care-vac in place  10/31-TC in place, elbow wound vac in place  -resp stable, elbow vac changed--ABX as per ID     -no new issues over the day  11/3-no new events-on TC/OOB as able; ENT f/up for awake laryngoscopy  -ENT f/up, vest use-feels well                                  -no new events--await decannulation   -phonating well, wound vac right arm in place  -awaiting floor bed-board CICU                    11/10-CTU-vanco until -no new events                     -continued ambulation, TC remains  -stable over the day  -decannulated and phonating well, wants rehab  11/15-VSS no acute issues overnight, await swallow eval   VSS; RSR 70-80; npo w/ koafeed tf's; mbs today; diet advanced to puree diet w/ mild thick liquids; noctural tf; insulin adjusted as per H. endo as per diet changes; dental consult- pt removed own dentures 2 days ago and now c/o rt upper jaw/ mouth pain-->Traumatic ulcer due to denture prosthesis- salt water rinses recommended;   rt elbow vac changed today; abx as per ID- pt will need chronic suppression as per ID; bactrim and diflucan; d/c IV vanco today d/w ID    VSS - pt tolerating puree diet w/ moderately thickened liquids  will d/c nocturnal feeds & kaofeed   VSS; rsr 70-90; rt elbow vac change today; tolerating puree diet; ck covid pcr sun; rehab placement mon/ e next week    VSS: RSR ;  tolerating puree diet; ck covid pcr sun; rehab placement mon/ tue next week     d/c planning   to rehab   VSS vac d/c'd from right elbow - as wound granulated - aquacel w/ aleven applied by PT - PT will remove on Wednesday to assess drainage- d/c to rehab tomorrow   VSS- d/c to rehab  today - K+ 5 - lactate 4.4 on VBG - pt VSS offers no complaints. DR. Cabrera aware - Patient is cleared by Dr. Cabrera

## 2022-09-07 NOTE — PROVIDER CONTACT NOTE (OTHER) - SITUATION
UPON INITIAL ASSESSMENT ON 9/7/22 @ 0800, IT IS NOTED PATIENT HAS HEALED PRESSURE ULCER ON SACRUM. IT IS NOTED THERE IS SCAR TISSUE WITH THICK SKIN OVER SCAR AND NO EVIDENCE OR OPEN AREAS ON SACRUM. UPON INITIAL ASSESSMENT ON 9/7/22 @ 0800, PATIENT IS ADMITTED WITH HEALED PRESSURE ULCER ON SACRUM. IT IS NOTED THERE IS SCAR TISSUE WITH THICK SKIN OVER SCAR AND NO EVIDENCE OR OPEN AREAS ON SACRUM.

## 2022-09-08 LAB
ALBUMIN SERPL ELPH-MCNC: 4.3 G/DL — SIGNIFICANT CHANGE UP (ref 3.3–5)
ALP SERPL-CCNC: 85 U/L — SIGNIFICANT CHANGE UP (ref 40–120)
ALT FLD-CCNC: 48 U/L — HIGH (ref 10–45)
ANION GAP SERPL CALC-SCNC: 15 MMOL/L — SIGNIFICANT CHANGE UP (ref 5–17)
APTT BLD: 27.5 SEC — SIGNIFICANT CHANGE UP (ref 27.5–35.5)
AST SERPL-CCNC: 82 U/L — HIGH (ref 10–40)
BILIRUB SERPL-MCNC: 0.7 MG/DL — SIGNIFICANT CHANGE UP (ref 0.2–1.2)
BUN SERPL-MCNC: 26 MG/DL — HIGH (ref 7–23)
CALCIUM SERPL-MCNC: 8.6 MG/DL — SIGNIFICANT CHANGE UP (ref 8.4–10.5)
CHLORIDE SERPL-SCNC: 109 MMOL/L — HIGH (ref 96–108)
CO2 SERPL-SCNC: 22 MMOL/L — SIGNIFICANT CHANGE UP (ref 22–31)
CREAT SERPL-MCNC: 0.71 MG/DL — SIGNIFICANT CHANGE UP (ref 0.5–1.3)
EGFR: 90 ML/MIN/1.73M2 — SIGNIFICANT CHANGE UP
FIBRINOGEN PPP-MCNC: 838 MG/DL — HIGH (ref 330–520)
GAS PNL BLDA: SIGNIFICANT CHANGE UP
GLUCOSE BLDC GLUCOMTR-MCNC: 102 MG/DL — HIGH (ref 70–99)
GLUCOSE BLDC GLUCOMTR-MCNC: 105 MG/DL — HIGH (ref 70–99)
GLUCOSE BLDC GLUCOMTR-MCNC: 109 MG/DL — HIGH (ref 70–99)
GLUCOSE BLDC GLUCOMTR-MCNC: 120 MG/DL — HIGH (ref 70–99)
GLUCOSE BLDC GLUCOMTR-MCNC: 122 MG/DL — HIGH (ref 70–99)
GLUCOSE BLDC GLUCOMTR-MCNC: 127 MG/DL — HIGH (ref 70–99)
GLUCOSE BLDC GLUCOMTR-MCNC: 127 MG/DL — HIGH (ref 70–99)
GLUCOSE BLDC GLUCOMTR-MCNC: 129 MG/DL — HIGH (ref 70–99)
GLUCOSE BLDC GLUCOMTR-MCNC: 140 MG/DL — HIGH (ref 70–99)
GLUCOSE BLDC GLUCOMTR-MCNC: 141 MG/DL — HIGH (ref 70–99)
GLUCOSE BLDC GLUCOMTR-MCNC: 141 MG/DL — HIGH (ref 70–99)
GLUCOSE BLDC GLUCOMTR-MCNC: 142 MG/DL — HIGH (ref 70–99)
GLUCOSE BLDC GLUCOMTR-MCNC: 143 MG/DL — HIGH (ref 70–99)
GLUCOSE BLDC GLUCOMTR-MCNC: 148 MG/DL — HIGH (ref 70–99)
GLUCOSE BLDC GLUCOMTR-MCNC: 149 MG/DL — HIGH (ref 70–99)
GLUCOSE BLDC GLUCOMTR-MCNC: 150 MG/DL — HIGH (ref 70–99)
GLUCOSE BLDC GLUCOMTR-MCNC: 152 MG/DL — HIGH (ref 70–99)
GLUCOSE BLDC GLUCOMTR-MCNC: 156 MG/DL — HIGH (ref 70–99)
GLUCOSE BLDC GLUCOMTR-MCNC: 156 MG/DL — HIGH (ref 70–99)
GLUCOSE BLDC GLUCOMTR-MCNC: 161 MG/DL — HIGH (ref 70–99)
GLUCOSE BLDC GLUCOMTR-MCNC: 165 MG/DL — HIGH (ref 70–99)
GLUCOSE BLDC GLUCOMTR-MCNC: 165 MG/DL — HIGH (ref 70–99)
GLUCOSE BLDC GLUCOMTR-MCNC: 172 MG/DL — HIGH (ref 70–99)
GLUCOSE SERPL-MCNC: 172 MG/DL — HIGH (ref 70–99)
HCT VFR BLD CALC: 26.9 % — LOW (ref 34.5–45)
HCT VFR BLD CALC: 29 % — LOW (ref 34.5–45)
HGB BLD-MCNC: 8.4 G/DL — LOW (ref 11.5–15.5)
HGB BLD-MCNC: 9.2 G/DL — LOW (ref 11.5–15.5)
INR BLD: 1.2 RATIO — HIGH (ref 0.88–1.16)
MAGNESIUM SERPL-MCNC: 2.5 MG/DL — SIGNIFICANT CHANGE UP (ref 1.6–2.6)
MCHC RBC-ENTMCNC: 23.7 PG — LOW (ref 27–34)
MCHC RBC-ENTMCNC: 23.8 PG — LOW (ref 27–34)
MCHC RBC-ENTMCNC: 31.2 GM/DL — LOW (ref 32–36)
MCHC RBC-ENTMCNC: 31.7 GM/DL — LOW (ref 32–36)
MCV RBC AUTO: 75.1 FL — LOW (ref 80–100)
MCV RBC AUTO: 75.8 FL — LOW (ref 80–100)
NRBC # BLD: 0 /100 WBCS — SIGNIFICANT CHANGE UP (ref 0–0)
NRBC # BLD: 2 /100 WBCS — HIGH (ref 0–0)
PHOSPHATE SERPL-MCNC: 3.8 MG/DL — SIGNIFICANT CHANGE UP (ref 2.5–4.5)
PLATELET # BLD AUTO: 158 K/UL — SIGNIFICANT CHANGE UP (ref 150–400)
PLATELET # BLD AUTO: 200 K/UL — SIGNIFICANT CHANGE UP (ref 150–400)
POTASSIUM SERPL-MCNC: 4.5 MMOL/L — SIGNIFICANT CHANGE UP (ref 3.5–5.3)
POTASSIUM SERPL-SCNC: 4.5 MMOL/L — SIGNIFICANT CHANGE UP (ref 3.5–5.3)
PROT SERPL-MCNC: 6.6 G/DL — SIGNIFICANT CHANGE UP (ref 6–8.3)
PROTHROM AB SERPL-ACNC: 14 SEC — HIGH (ref 10.5–13.4)
RBC # BLD: 3.55 M/UL — LOW (ref 3.8–5.2)
RBC # BLD: 3.86 M/UL — SIGNIFICANT CHANGE UP (ref 3.8–5.2)
RBC # FLD: 23.9 % — HIGH (ref 10.3–14.5)
RBC # FLD: 25.2 % — HIGH (ref 10.3–14.5)
SODIUM SERPL-SCNC: 146 MMOL/L — HIGH (ref 135–145)
VANCOMYCIN TROUGH SERPL-MCNC: 13.3 UG/ML — SIGNIFICANT CHANGE UP (ref 10–20)
VANCOMYCIN TROUGH SERPL-MCNC: 21.7 UG/ML — HIGH (ref 10–20)
WBC # BLD: 13.27 K/UL — HIGH (ref 3.8–10.5)
WBC # BLD: 19.48 K/UL — HIGH (ref 3.8–10.5)
WBC # FLD AUTO: 13.27 K/UL — HIGH (ref 3.8–10.5)
WBC # FLD AUTO: 19.48 K/UL — HIGH (ref 3.8–10.5)

## 2022-09-08 PROCEDURE — 31500 INSERT EMERGENCY AIRWAY: CPT

## 2022-09-08 PROCEDURE — 93010 ELECTROCARDIOGRAM REPORT: CPT

## 2022-09-08 PROCEDURE — 99292 CRITICAL CARE ADDL 30 MIN: CPT | Mod: 25

## 2022-09-08 PROCEDURE — 71045 X-RAY EXAM CHEST 1 VIEW: CPT | Mod: 26,76

## 2022-09-08 PROCEDURE — 99291 CRITICAL CARE FIRST HOUR: CPT | Mod: 25

## 2022-09-08 RX ORDER — BUDESONIDE, MICRONIZED 100 %
0.5 POWDER (GRAM) MISCELLANEOUS EVERY 12 HOURS
Refills: 0 | Status: DISCONTINUED | OUTPATIENT
Start: 2022-09-08 | End: 2022-10-10

## 2022-09-08 RX ORDER — ACETAMINOPHEN 500 MG
1000 TABLET ORAL ONCE
Refills: 0 | Status: COMPLETED | OUTPATIENT
Start: 2022-09-08 | End: 2022-09-08

## 2022-09-08 RX ORDER — FUROSEMIDE 40 MG
40 TABLET ORAL ONCE
Refills: 0 | Status: COMPLETED | OUTPATIENT
Start: 2022-09-08 | End: 2022-09-08

## 2022-09-08 RX ORDER — FENTANYL CITRATE 50 UG/ML
50 INJECTION INTRAVENOUS ONCE
Refills: 0 | Status: DISCONTINUED | OUTPATIENT
Start: 2022-09-08 | End: 2022-09-08

## 2022-09-08 RX ORDER — POTASSIUM CHLORIDE 20 MEQ
10 PACKET (EA) ORAL
Refills: 0 | Status: COMPLETED | OUTPATIENT
Start: 2022-09-08 | End: 2022-09-08

## 2022-09-08 RX ORDER — FUROSEMIDE 40 MG
40 TABLET ORAL DAILY
Refills: 0 | Status: DISCONTINUED | OUTPATIENT
Start: 2022-09-08 | End: 2022-09-08

## 2022-09-08 RX ORDER — LABETALOL HCL 100 MG
10 TABLET ORAL ONCE
Refills: 0 | Status: COMPLETED | OUTPATIENT
Start: 2022-09-08 | End: 2022-09-08

## 2022-09-08 RX ORDER — MAGNESIUM SULFATE 500 MG/ML
2 VIAL (ML) INJECTION ONCE
Refills: 0 | Status: COMPLETED | OUTPATIENT
Start: 2022-09-08 | End: 2022-09-08

## 2022-09-08 RX ORDER — FUROSEMIDE 40 MG
40 TABLET ORAL DAILY
Refills: 0 | Status: DISCONTINUED | OUTPATIENT
Start: 2022-09-08 | End: 2022-09-15

## 2022-09-08 RX ORDER — SPIRONOLACTONE 25 MG/1
25 TABLET, FILM COATED ORAL EVERY 12 HOURS
Refills: 0 | Status: DISCONTINUED | OUTPATIENT
Start: 2022-09-08 | End: 2022-09-24

## 2022-09-08 RX ORDER — LABETALOL HCL 100 MG
200 TABLET ORAL EVERY 8 HOURS
Refills: 0 | Status: DISCONTINUED | OUTPATIENT
Start: 2022-09-08 | End: 2022-09-08

## 2022-09-08 RX ORDER — LABETALOL HCL 100 MG
20 TABLET ORAL ONCE
Refills: 0 | Status: COMPLETED | OUTPATIENT
Start: 2022-09-08 | End: 2022-09-08

## 2022-09-08 RX ORDER — POTASSIUM CHLORIDE 20 MEQ
10 PACKET (EA) ORAL ONCE
Refills: 0 | Status: COMPLETED | OUTPATIENT
Start: 2022-09-08 | End: 2022-09-08

## 2022-09-08 RX ORDER — LABETALOL HCL 100 MG
1 TABLET ORAL
Qty: 200 | Refills: 0 | Status: DISCONTINUED | OUTPATIENT
Start: 2022-09-08 | End: 2022-09-09

## 2022-09-08 RX ADMIN — Medication 400 MILLIGRAM(S): at 02:00

## 2022-09-08 RX ADMIN — Medication 40 MILLIGRAM(S): at 13:51

## 2022-09-08 RX ADMIN — Medication 200 MILLIGRAM(S): at 14:33

## 2022-09-08 RX ADMIN — SPIRONOLACTONE 25 MILLIGRAM(S): 25 TABLET, FILM COATED ORAL at 18:17

## 2022-09-08 RX ADMIN — Medication 3 MILLILITER(S): at 18:00

## 2022-09-08 RX ADMIN — Medication 60 MG/MIN: at 20:29

## 2022-09-08 RX ADMIN — Medication 50 MILLIGRAM(S): at 13:03

## 2022-09-08 RX ADMIN — Medication 1 APPLICATION(S): at 11:20

## 2022-09-08 RX ADMIN — Medication 10 MILLIGRAM(S): at 10:42

## 2022-09-08 RX ADMIN — Medication 40 MILLIGRAM(S): at 01:56

## 2022-09-08 RX ADMIN — MEXILETINE HYDROCHLORIDE 200 MILLIGRAM(S): 150 CAPSULE ORAL at 14:35

## 2022-09-08 RX ADMIN — Medication 104 MILLIGRAM(S): at 21:42

## 2022-09-08 RX ADMIN — Medication 4 MILLILITER(S): at 11:10

## 2022-09-08 RX ADMIN — CHLORHEXIDINE GLUCONATE 15 MILLILITER(S): 213 SOLUTION TOPICAL at 05:04

## 2022-09-08 RX ADMIN — CHLORHEXIDINE GLUCONATE 5 MILLILITER(S): 213 SOLUTION TOPICAL at 18:17

## 2022-09-08 RX ADMIN — Medication 104 MILLIGRAM(S): at 05:04

## 2022-09-08 RX ADMIN — Medication 0.5 MILLIGRAM(S): at 18:00

## 2022-09-08 RX ADMIN — Medication 25 GRAM(S): at 14:35

## 2022-09-08 RX ADMIN — Medication 3 MILLILITER(S): at 11:09

## 2022-09-08 RX ADMIN — Medication 8 MILLIGRAM(S): at 18:16

## 2022-09-08 RX ADMIN — FENTANYL CITRATE 50 MICROGRAM(S): 50 INJECTION INTRAVENOUS at 17:30

## 2022-09-08 RX ADMIN — Medication 50 MILLIEQUIVALENT(S): at 21:42

## 2022-09-08 RX ADMIN — Medication 10 MILLIGRAM(S): at 07:58

## 2022-09-08 RX ADMIN — Medication 3 MILLILITER(S): at 23:15

## 2022-09-08 RX ADMIN — Medication 50 MILLIGRAM(S): at 05:04

## 2022-09-08 RX ADMIN — CHLORHEXIDINE GLUCONATE 1 APPLICATION(S): 213 SOLUTION TOPICAL at 05:05

## 2022-09-08 RX ADMIN — Medication 1000 MILLIGRAM(S): at 02:15

## 2022-09-08 RX ADMIN — Medication 0.5 MILLIGRAM(S): at 05:15

## 2022-09-08 RX ADMIN — Medication 100 MILLIEQUIVALENT(S): at 06:30

## 2022-09-08 RX ADMIN — Medication 100 MILLIEQUIVALENT(S): at 04:49

## 2022-09-08 RX ADMIN — Medication 250 MILLIGRAM(S): at 13:08

## 2022-09-08 RX ADMIN — MEXILETINE HYDROCHLORIDE 200 MILLIGRAM(S): 150 CAPSULE ORAL at 21:42

## 2022-09-08 RX ADMIN — Medication 104 MILLIGRAM(S): at 13:03

## 2022-09-08 RX ADMIN — Medication 20 MILLIGRAM(S): at 21:00

## 2022-09-08 RX ADMIN — Medication 4 MILLILITER(S): at 05:16

## 2022-09-08 RX ADMIN — PANTOPRAZOLE SODIUM 40 MILLIGRAM(S): 20 TABLET, DELAYED RELEASE ORAL at 12:08

## 2022-09-08 RX ADMIN — FENTANYL CITRATE 50 MICROGRAM(S): 50 INJECTION INTRAVENOUS at 18:00

## 2022-09-08 RX ADMIN — Medication 3 MILLILITER(S): at 05:15

## 2022-09-08 RX ADMIN — Medication 100 MILLIEQUIVALENT(S): at 07:00

## 2022-09-08 NOTE — PROGRESS NOTE ADULT - SUBJECTIVE AND OBJECTIVE BOX
CRITICAL CARE ATTENDING - CTICU    MEDICATIONS  (STANDING):  acetylcysteine 10%  Inhalation 4 milliLiter(s) Inhalation every 6 hours  albuterol/ipratropium for Nebulization 3 milliLiter(s) Nebulizer every 6 hours  buDESOnide    Inhalation Suspension 0.5 milliGRAM(s) Inhalation every 12 hours  chlorhexidine 0.12% Liquid 5 milliLiter(s) Oral Mucosa two times a day  chlorhexidine 2% Cloths 1 Application(s) Topical <User Schedule>  collagenase Ointment 1 Application(s) Topical daily  dexMEDEtomidine Infusion 0.4 MICROgram(s)/kG/Hr (6.77 mL/Hr) IV Continuous <Continuous>  dextrose 50% Injectable 50 milliLiter(s) IV Push every 15 minutes  dextrose 50% Injectable 25 milliLiter(s) IV Push every 15 minutes  hydrocortisone sodium succinate Injectable 50 milliGRAM(s) IV Push every 8 hours  insulin regular Infusion 3 Unit(s)/Hr (3 mL/Hr) IV Continuous <Continuous>  metoclopramide  IVPB 10 milliGRAM(s) IV Intermittent every 8 hours  mexiletine 200 milliGRAM(s) Oral every 8 hours  milrinone Infusion 0.4 MICROgram(s)/kG/Min (8.12 mL/Hr) IV Continuous <Continuous>  niCARdipine Infusion 5 mG/Hr (25 mL/Hr) IV Continuous <Continuous>  pantoprazole  Injectable 40 milliGRAM(s) IV Push daily  potassium chloride  10 mEq/50 mL IVPB 10 milliEquivalent(s) IV Intermittent every 1 hour  potassium chloride  10 mEq/50 mL IVPB 10 milliEquivalent(s) IV Intermittent every 1 hour  potassium chloride  10 mEq/50 mL IVPB 10 milliEquivalent(s) IV Intermittent every 1 hour  propofol Infusion 36.928 MICROgram(s)/kG/Min (15 mL/Hr) IV Continuous <Continuous>  sodium chloride 0.9%. 1000 milliLiter(s) (10 mL/Hr) IV Continuous <Continuous>  vancomycin  IVPB 1000 milliGRAM(s) IV Intermittent every 12 hours                                    9.2    19.48 )-----------( 200      ( 08 Sep 2022 00:44 )             29.0       09-08    146<H>  |  109<H>  |  26<H>  ----------------------------<  172<H>  4.5   |  22  |  0.71    Ca    8.6      08 Sep 2022 00:44  Phos  3.8       Mg     2.5         TPro  6.6  /  Alb  4.3  /  TBili  0.7  /  DBili  x   /  AST  82<H>  /  ALT  48<H>  /  AlkPhos  85        PT/INR - ( 08 Sep 2022 00:44 )   PT: 14.0 sec;   INR: 1.20 ratio         PTT - ( 08 Sep 2022 00:44 )  PTT:27.5 sec    Mode: off  FiO2: 40      Daily     Daily Weight in k.2 (08 Sep 2022 00:00)       @ 07:01  -   @ 07:00  --------------------------------------------------------  IN: 2125.5 mL / OUT: 2360 mL / NET: -234.5 mL        Critically Ill patient  : [x ] preoperative ,   [ ] post operative    Requires :  [ x] Arterial Line   [x] Central Line  [ ] PA catheter  [ ] IABP  [ ] ECMO  [ ] LVAD  [ ] Ventilator  [ ] pacemaker [ ] Impella.                      [ x] ABG's     [x ] Pulse Oxymetry Monitoring  Bedside evaluation , monitoring , treatment of hemodynamics , fluids , IVP/ IVCD meds.    Hemodynamic lability,  instability. Requires IVCD [ ] vasopressors [x] inotropes  [x] vasodilator  [ ]IVSS fluid  to maintain MAP, perfusion, C.I.     Diagnosis:     Admitted - chest / back pain     Type A Aortic Dissection     Hypertension - requires IVCD / IVP labetalol     Present, assisting, supervising:   [x ] A-Line insertion     Emergently to OR for Repair     DM- IVCD Insulin     COPD     A  Fib                         I, Bon Jiménez, personally performed the services described in this documentation. All medical record entries made by the scribe were at my direction and in my presence. I have reviewed the chart and agree that the record reflects my personal performance and is accurate and complete.   Bon Jiménez MD.       By signing my name below, I, Kieran Plascencia, attest that this documentation has been prepared under the direction and in the presence of Bon Jiménez MD.   Electronically Signed: Georgi Cordero 22 @ 07:22        Discussed with CT surgeon, Physician Assistant - Nurse Practitioner- Critical care medicine team.   Dicussed at  AM / PM rounds.   Chart, labs , films reviewed.    Total Time:  CRITICAL CARE ATTENDING - CTICU    MEDICATIONS  (STANDING):  acetylcysteine 10%  Inhalation 4 milliLiter(s) Inhalation every 6 hours  albuterol/ipratropium for Nebulization 3 milliLiter(s) Nebulizer every 6 hours  buDESOnide    Inhalation Suspension 0.5 milliGRAM(s) Inhalation every 12 hours  chlorhexidine 0.12% Liquid 5 milliLiter(s) Oral Mucosa two times a day  chlorhexidine 2% Cloths 1 Application(s) Topical <User Schedule>  collagenase Ointment 1 Application(s) Topical daily  dexMEDEtomidine Infusion 0.4 MICROgram(s)/kG/Hr (6.77 mL/Hr) IV Continuous <Continuous>  dextrose 50% Injectable 50 milliLiter(s) IV Push every 15 minutes  dextrose 50% Injectable 25 milliLiter(s) IV Push every 15 minutes  hydrocortisone sodium succinate Injectable 50 milliGRAM(s) IV Push every 8 hours  insulin regular Infusion 3 Unit(s)/Hr (3 mL/Hr) IV Continuous <Continuous>  metoclopramide  IVPB 10 milliGRAM(s) IV Intermittent every 8 hours  mexiletine 200 milliGRAM(s) Oral every 8 hours  milrinone Infusion 0.4 MICROgram(s)/kG/Min (8.12 mL/Hr) IV Continuous <Continuous>  niCARdipine Infusion 5 mG/Hr (25 mL/Hr) IV Continuous <Continuous>  pantoprazole  Injectable 40 milliGRAM(s) IV Push daily  potassium chloride  10 mEq/50 mL IVPB 10 milliEquivalent(s) IV Intermittent every 1 hour  potassium chloride  10 mEq/50 mL IVPB 10 milliEquivalent(s) IV Intermittent every 1 hour  potassium chloride  10 mEq/50 mL IVPB 10 milliEquivalent(s) IV Intermittent every 1 hour  propofol Infusion 36.928 MICROgram(s)/kG/Min (15 mL/Hr) IV Continuous <Continuous>  sodium chloride 0.9%. 1000 milliLiter(s) (10 mL/Hr) IV Continuous <Continuous>  vancomycin  IVPB 1000 milliGRAM(s) IV Intermittent every 12 hours                                    9.2    19.48 )-----------( 200      ( 08 Sep 2022 00:44 )             29.0       09-08    146<H>  |  109<H>  |  26<H>  ----------------------------<  172<H>  4.5   |  22  |  0.71    Ca    8.6      08 Sep 2022 00:44  Phos  3.8       Mg     2.5         TPro  6.6  /  Alb  4.3  /  TBili  0.7  /  DBili  x   /  AST  82<H>  /  ALT  48<H>  /  AlkPhos  85        PT/INR - ( 08 Sep 2022 00:44 )   PT: 14.0 sec;   INR: 1.20 ratio         PTT - ( 08 Sep 2022 00:44 )  PTT:27.5 sec    Mode: off  FiO2: 40      Daily     Daily Weight in k.2 (08 Sep 2022 00:00)       @ 07:01  -   @ 07:00  --------------------------------------------------------  IN: 2125.5 mL / OUT: 2360 mL / NET: -234.5 mL        Critically Ill patient  : [x ] preoperative ,   [ ] post operative    Requires :  [ x] Arterial Line   [x] Central Line  [ ] PA catheter  [ ] IABP  [ ] ECMO  [ ] LVAD  [ ] Ventilator  [ ] pacemaker [ ] Impella.                      [ x] ABG's     [x ] Pulse Oxymetry Monitoring  Bedside evaluation , monitoring , treatment of hemodynamics , fluids , IVP/ IVCD meds.        Diagnosis:     Admitted - chest / back pain     Type A Aortic Dissection     Hypertension - requires IVCD / IVP labetalol     Hemodynamic lability,  instability. Requires IVCD [ ] vasopressors [x] inotropes  [x] vasodilator  [ ]IVSS fluid  to maintain MAP, perfusion, C.I.     Present, assisting, supervising:   [x ] A-Line insertion     Emergently to OR for Repair     DM- IVCD Insulin     COPD     A  Fib                         I, Bon Jiménez, personally performed the services described in this documentation. All medical record entries made by the scribe were at my direction and in my presence. I have reviewed the chart and agree that the record reflects my personal performance and is accurate and complete.   Bon Jiménez MD.       By signing my name below, I, Kieran Plascencia, attest that this documentation has been prepared under the direction and in the presence of Bon Jiménez MD.   Electronically Signed: Georgi Cordero 22 @ 07:22        Discussed with CT surgeon, Physician Assistant - Nurse Practitioner- Critical care medicine team.   Dicussed at  AM / PM rounds.   Chart, labs , films reviewed.    Total Time:  CRITICAL CARE ATTENDING - CTICU    MEDICATIONS  (STANDING):  acetylcysteine 10%  Inhalation 4 milliLiter(s) Inhalation every 6 hours  albuterol/ipratropium for Nebulization 3 milliLiter(s) Nebulizer every 6 hours  buDESOnide    Inhalation Suspension 0.5 milliGRAM(s) Inhalation every 12 hours  chlorhexidine 0.12% Liquid 5 milliLiter(s) Oral Mucosa two times a day  chlorhexidine 2% Cloths 1 Application(s) Topical <User Schedule>  collagenase Ointment 1 Application(s) Topical daily  dexMEDEtomidine Infusion 0.4 MICROgram(s)/kG/Hr (6.77 mL/Hr) IV Continuous <Continuous>  dextrose 50% Injectable 50 milliLiter(s) IV Push every 15 minutes  dextrose 50% Injectable 25 milliLiter(s) IV Push every 15 minutes  hydrocortisone sodium succinate Injectable 50 milliGRAM(s) IV Push every 8 hours  insulin regular Infusion 3 Unit(s)/Hr (3 mL/Hr) IV Continuous <Continuous>  metoclopramide  IVPB 10 milliGRAM(s) IV Intermittent every 8 hours  mexiletine 200 milliGRAM(s) Oral every 8 hours  milrinone Infusion 0.4 MICROgram(s)/kG/Min (8.12 mL/Hr) IV Continuous <Continuous>  niCARdipine Infusion 5 mG/Hr (25 mL/Hr) IV Continuous <Continuous>  pantoprazole  Injectable 40 milliGRAM(s) IV Push daily  potassium chloride  10 mEq/50 mL IVPB 10 milliEquivalent(s) IV Intermittent every 1 hour  potassium chloride  10 mEq/50 mL IVPB 10 milliEquivalent(s) IV Intermittent every 1 hour  potassium chloride  10 mEq/50 mL IVPB 10 milliEquivalent(s) IV Intermittent every 1 hour  propofol Infusion 36.928 MICROgram(s)/kG/Min (15 mL/Hr) IV Continuous <Continuous>  sodium chloride 0.9%. 1000 milliLiter(s) (10 mL/Hr) IV Continuous <Continuous>  vancomycin  IVPB 1000 milliGRAM(s) IV Intermittent every 12 hours                                    9.2    19.48 )-----------( 200      ( 08 Sep 2022 00:44 )             29.0       09-08    146<H>  |  109<H>  |  26<H>  ----------------------------<  172<H>  4.5   |  22  |  0.71    Ca    8.6      08 Sep 2022 00:44  Phos  3.8       Mg     2.5         TPro  6.6  /  Alb  4.3  /  TBili  0.7  /  DBili  x   /  AST  82<H>  /  ALT  48<H>  /  AlkPhos  85        PT/INR - ( 08 Sep 2022 00:44 )   PT: 14.0 sec;   INR: 1.20 ratio         PTT - ( 08 Sep 2022 00:44 )  PTT:27.5 sec    Mode: off  FiO2: 40      Daily     Daily Weight in k.2 (08 Sep 2022 00:00)       @ 07:01  -   @ 07:00  --------------------------------------------------------  IN: 2125.5 mL / OUT: 2360 mL / NET: -234.5 mL        Critically Ill patient  : [ ] preoperative ,   [ x] post operative    Requires :  [ x] Arterial Line   [x] Central Line  [ ] PA catheter  [ ] IABP  [ ] ECMO  [ ] LVAD  [ ] Ventilator  [ x] pacemaker - TPM  [ ] Impella.                      [ x] ABG's     [x ] Pulse Oxymetry Monitoring  Bedside evaluation , monitoring , treatment of hemodynamics , fluids , IVP/ IVCD meds.        Diagnosis:     POD 2 - Aortic Dissection Repair     Admitted - chest / back pain     Type A Aortic Dissection     Hypertension -     Hemodynamic lability,  instability. Requires IVCD [ ] vasopressors [x] inotropes  [x] vasodilator  [ ]IVSS fluid  to maintain MAP, perfusion, C.I.     CHF- acute [ x]   chronic [ ]    systolic [ x]   diastolic [ ]  Valvular [ ]          - Echo- EF -             [ x] RV dysfunction - post op          - Cxr-cardiomegally, edema          - Clinical-  [x ]inotropes   [ ]pressors   [x ]diuresis   [ ]IABP   [ ]ECMO   [ ]LVAD   [ ]Respiratory Failure         -     DM- IVCD Insulin     COPD     A  Fib     Colostomy - colon CA     Chest Tube Drainage / Management     Temporary pacemaker (TPM) interrogation and setting.     Requires bedside physical therapy, mobilization and total group home care.     Hypernatremia     secretions     Swallow testing                         I, Bon Jiménez, personally performed the services described in this documentation. All medical record entries made by the scribe were at my direction and in my presence. I have reviewed the chart and agree that the record reflects my personal performance and is accurate and complete.   Bon Jiménez MD.       By signing my name below, I, Kieran Plascencia, attest that this documentation has been prepared under the direction and in the presence of Bon Jiménez MD.   Electronically Signed: Georgi Cordero 22 @ 07:22        Discussed with CT surgeon, Physician Assistant - Nurse Practitioner- Critical care medicine team.   Dicussed at  AM / PM rounds.   Chart, labs , films reviewed.    Total Time:  30 min

## 2022-09-08 NOTE — DIETITIAN INITIAL EVALUATION ADULT - REASON FOR ADMISSION
Pt is a 71F PMH DM, COPD, Chronic Adrenal Insufficiency on Chronic prednisone, history of colorectal cancer s/p resection (colostomy bag), Hx of CAD, Chronic A fib on Eliquis, and tracheomalacia and multiple intubations, recent dx of OM presented to ED at [facility] this morning with epigastric pain, belching and central chest pain. Found on CTA to have type A aortic dissection and transferred to [facility] for surgical evaluation. S/p Type A Aortic Dissection.

## 2022-09-08 NOTE — DIETITIAN INITIAL EVALUATION ADULT - NSICDXPASTSURGICALHX_GEN_ALL_CORE_FT
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    H/O pelvic surgery 5 years ago - s/p fracture    H/O total knee replacement, bilateral 5 years ago    History of partial hysterectomy 30 years ago - fibroids    History of sinus surgery multiple sinus surgeries    History of tracheomalacia 2015 - attempted tracheal stenting ([facility])- course complicated by obstruction, respiratory failure, multiple CPR attempts -  stent discontinued; 10/20/2016 Tracheobronchoplasty (Prolene Mesh) performed at [facility] by Dr Zapien    Rectal bleeding exam under anesthesia ([facility]) 2/2018    S/P bronchoscopy 6/5/2018 - [facility] (Dr Zapien) no evidence of tracheobronchomalacia in trachea or bronchial tubes

## 2022-09-08 NOTE — DIETITIAN INITIAL EVALUATION ADULT - ENTERAL
If EN warranted consider feeds of Glucerna 1.5 with goal rate of 55mL/hr x 24hr to provide 1320mL volume, 1980kcal, 109g protein, 1002mL free water. Regimen to meet 29kcal/kg, 1.6g/kg protein based on dosing wt of 67kg.

## 2022-09-08 NOTE — DIETITIAN INITIAL EVALUATION ADULT - PERTINENT MEDS FT
MEDICATIONS  (STANDING):  acetylcysteine 10%  Inhalation 4 milliLiter(s) Inhalation every 6 hours  albuterol/ipratropium for Nebulization 3 milliLiter(s) Nebulizer every 6 hours  buDESOnide    Inhalation Suspension 0.5 milliGRAM(s) Inhalation every 12 hours  chlorhexidine 0.12% Liquid 5 milliLiter(s) Oral Mucosa two times a day  chlorhexidine 2% Cloths 1 Application(s) Topical <User Schedule>  collagenase Ointment 1 Application(s) Topical daily  dexMEDEtomidine Infusion 0.4 MICROgram(s)/kG/Hr (6.77 mL/Hr) IV Continuous <Continuous>  dextrose 50% Injectable 50 milliLiter(s) IV Push every 15 minutes  dextrose 50% Injectable 25 milliLiter(s) IV Push every 15 minutes  hydrocortisone sodium succinate Injectable 50 milliGRAM(s) IV Push every 8 hours  insulin regular Infusion 3 Unit(s)/Hr (3 mL/Hr) IV Continuous <Continuous>  labetalol 200 milliGRAM(s) Oral every 8 hours  metoclopramide  IVPB 10 milliGRAM(s) IV Intermittent every 8 hours  mexiletine 200 milliGRAM(s) Oral every 8 hours  milrinone Infusion 0.4 MICROgram(s)/kG/Min (8.12 mL/Hr) IV Continuous <Continuous>  niCARdipine Infusion 5 mG/Hr (25 mL/Hr) IV Continuous <Continuous>  pantoprazole  Injectable 40 milliGRAM(s) IV Push daily  potassium chloride  10 mEq/50 mL IVPB 10 milliEquivalent(s) IV Intermittent every 1 hour  potassium chloride  10 mEq/50 mL IVPB 10 milliEquivalent(s) IV Intermittent every 1 hour  potassium chloride  10 mEq/50 mL IVPB 10 milliEquivalent(s) IV Intermittent every 1 hour  sodium chloride 0.9%. 1000 milliLiter(s) (10 mL/Hr) IV Continuous <Continuous>  vancomycin  IVPB 1000 milliGRAM(s) IV Intermittent every 12 hours    MEDICATIONS  (PRN):

## 2022-09-08 NOTE — DIETITIAN INITIAL EVALUATION ADULT - REASON
Nutrition focused physical exam deferred in current setting, no overt fat or muscle depletions noted visually. Will re-assess as able.

## 2022-09-08 NOTE — DIETITIAN INITIAL EVALUATION ADULT - NSFNSGIIOFT_GEN_A_CORE
No GI distress noted at this time, pt with colostomy.    09-07-22 @ 07:01  -  09-08-22 @ 07:00  --------------------------------------------------------  OUT:    Chest Tube (mL): 360 mL    Chest Tube (mL): 70 mL    Colostomy (mL): 0 mL    Nasogastric/Oral tube (mL): 170 mL  Total OUT: 600 mL    Total NET: -600 mL      09-08-22 @ 07:01  -  09-08-22 @ 09:26  --------------------------------------------------------  OUT:    Chest Tube (mL): 0 mL    Chest Tube (mL): 0 mL    Colostomy (mL): 0 mL  Total OUT: 0 mL    Total NET: 0 mL

## 2022-09-08 NOTE — DIETITIAN INITIAL EVALUATION ADULT - ADD RECOMMEND
Defer initiation of diet to team, recommend consistent carbohydrate restriction + Glucerna Shake 240mls 2x daily (440kcals, 20g protein). Consider multivitamin and vitamin C supplementation if not otherwise contraindicated. If pt unable to tolerate PO for prolonged time period consider alternate routes of nutrition. Continue to monitor nutritional intake, labs, weights, BM, skin, clinical course.

## 2022-09-08 NOTE — DIETITIAN INITIAL EVALUATION ADULT - NSFNSPHYEXAMSKINFT_GEN_A_CORE
Per wound care RN assessment: "Sacral area has old scarring- dark tissue from previous Pressure Injury." +midsternal incision.

## 2022-09-08 NOTE — DIETITIAN INITIAL EVALUATION ADULT - OTHER INFO
-- Weight hx per previous RD note/EMR: 137.8 lbs (07-06), 129.5 lbs (05-12), 130 lbs (03-31), 147 lbs (02-18), 140 lbs (01-27), 137 lbs (08/30/21), 146 lbs (07/03/21). Dosing wt this admission 147.7lbs (09-07). Most recent weights elevated, ?2/2 fluid retention, at 173.9lbs (09-07 via bedscale), 174.6lbs (09-08). Will continue to monitor.  -- HbA1c of 9.4% (9/6/22) suggests poor glycemic control PTA. Per H&P pt ordered for Admelog and insulin glargine PTA. Currently on insulin gtt for BG management.  -- Milrinone and nicardipine gtt off at this time  -- IVF: NS @ 10mL/hr

## 2022-09-08 NOTE — PROGRESS NOTE ADULT - SUBJECTIVE AND OBJECTIVE BOX
HPI:  71F PMH DM, COPD, Chronic Adrenal Insufficiency on Chronic prednisone, history of colorectal cancer s/p resection (colostomy bag), Hx of CAD, Chronic A fib on Eliquis, and tracheomalacia and multiple intubations, recent dx of OM presented to ED at [facility] this morning with epigastric pain, belching and central chest pain. Found on CTA to have type A aortic dissection and transferred to [facility] for surgical evaluation by Dr. Cabrera. (06 Sep 2022 16:32)      Patient seen and examined at the bedside.    Remained critically ill on continuous ICU monitoring.    OBJECTIVE:  Vital Signs Last 24 Hrs  T(C): 37.7 (08 Sep 2022 16:00), Max: 37.8 (07 Sep 2022 20:00)  T(F): 99.9 (08 Sep 2022 16:00), Max: 100 (07 Sep 2022 20:00)  HR: 75 (08 Sep 2022 18:00) (55 - 88)  BP: --  BP(mean): --  RR: 20 (08 Sep 2022 18:00) (12 - 44)  SpO2: 100% (08 Sep 2022 18:00) (89% - 100%)    Parameters below as of 08 Sep 2022 16:00  Patient On (Oxygen Delivery Method): mask, aerosol    O2 Concentration (%): 40    Physical Exam:   General: sedated and intubated   Neurology: sedated   Eyes: bilateral pupils equal and reactive   ENT/Neck: Neck supple, trachea midline, No JVD, +ETT midline   Respiratory: Clear bilaterally   CV: S1S2, no murmurs        [x] Sternal dressing, [x] Mediastinal CT,        [x] Sinus rhythm,  [x] Temporary pacing, - VVI 50  Abdominal: Soft, NT, ND +BS   Extremities: 1-2+ pedal edema noted, + peripheral pulses   Skin: No Rashes, Hematoma, Ecchymosis                            Assessment:  71F PMH DM, COPD, Chronic Adrenal Insufficiency on Chronic prednisone, history of colorectal cancer s/p resection (colostomy bag), Hx of CAD, Chronic A fib on Eliquis, and tracheomalacia and multiple intubations, recent dx of OM presented to ED at [facility] this morning with epigastric pain, belching and central chest pain. Found on CTA to have type A aortic dissection and transferred to [facility] for surgical evaluation by Dr. Cabrera.     Ascending aortic dissection s/p modified bentall and hemiarch on 9/6   Hemodynamic instability  Cardiogenic shock   Hypovolemic shock   Post op respiratory insufficiency  Acute blood loss anemia    Plan:   ***Neuro***   Addressed analgesic regimen to optimize function and sedated with IV Precedex  for ventilatory synchrony.      ***Cardiovascular**  Patient with a Type A aortic dissection underwent aortic dissection repair with a modified bentall and hemiarch on 9/6. Patient is on inotropic support with IV Primacor infusion for systolic heart failure. She requires blood pressure management with Cardene and Labetalol, as well as, Mexiletine for rate control.  Invasive hemodynamic monitoring with a central venous catheter & an A-line were required for the continuous central venous and MAP/BP monitoring to ensure adequate cardiovascular support. Temporary pacing wires, VVI 50, in place.      ***Pulmonary***  Respiratory status required full ventilatory support, close monitoring of respiratory rate and breathing pattern, the following of ABG’s with A-line monitoring, continuous pulse oximetry monitoring. Patient with a history of multiple intubations was intubated yesterday and was subsequently extubated later that day. However, the patient became aggressive and agitated, thus, the patient required reintubation today.        Mode: AC/ CMV (Assist Control/ Continuous Mandatory Ventilation)  RR (machine): 14  TV (machine): 500  FiO2: 50  PEEP: 5  ITime: 1  MAP: 13  PIP: 28              ***Heme***  Anemia due to acute blood loss, no current plan for transfusion. Continue to monitor hemoglobin and hematocrit levels. In the OR on 9/6, the patient was given 1000cc FEIBA, 2000cc K-Centra, 3U PRBC, 2U Cryo, 2U FFP, and 2 Platelets.        ***GI***  Patient requires Protonix for stress ulcer prophylaxis and Reglan for gut motility. Patient is currently tolerating 30 cc/hr of TF Glucerna 1.5 Chago; will plan to progress tube feed rate to goal rate of 40cc/hr every 4 hours.       ***Renal***  Diuresis with Lasix and Aldactone. Continue monitoring urine output, fluid balance, electrolytes, and BUN/Creatinine to avoid intravascular volume depletion.     ***ID***  Febrile, white blood cells elevated to 13.27. Continue trending white blood count and monitoring fever curve. Surgical swab on 9/6 of right heel grew Proteus mirabilis ESBL. Patient requires Vancomycin for post op prophylaxis.           ***Endocrine***  Metabolic stability, history of diabetes mellitus required an IV regular Insulin drip while following serial glucose levels to help achieve and maintain euglycemia.            Patient requires continuous monitoring with bedside rhythm monitoring, pulse oximetry monitoring, and continuous central venous and arterial pressure monitoring; and intermittent blood gas analysis. Care plan discussed with the ICU care team.   Patient remained critical, at risk for life threatening decompensation.    I have spent 30 minutes providing critical care management to this patient.    By signing my name below, I, Gisella Bailey, attest that this documentation has been prepared under the direction and in the presence of Marbella Martin MD   Electronically signed: Georgi Lozada, 09-08-22 @ 19:09    IMarbella, personally performed the services described in this documentation. all medical record entries made by the scribe were at my direction and in my presence. I have reviewed the chart and agree that the record reflects my personal performance and is accurate and complete  Electronically signed: Marbella Martin MD  HPI:  71F PMH DM, COPD, Chronic Adrenal Insufficiency on Chronic prednisone, history of colorectal cancer s/p resection (colostomy bag), Hx of CAD, Chronic A fib on Eliquis, and tracheomalacia and multiple intubations, recent dx of OM presented to ED at [facility] this morning with epigastric pain, belching and central chest pain. Found on CTA to have type A aortic dissection and transferred to [facility] for surgical evaluation by Dr. Cabrera. (06 Sep 2022 16:32)      Patient seen and examined at the bedside.    Remained critically ill on continuous ICU monitoring.    OBJECTIVE:  Vital Signs Last 24 Hrs  T(C): 37.7 (08 Sep 2022 16:00), Max: 37.8 (07 Sep 2022 20:00)  T(F): 99.9 (08 Sep 2022 16:00), Max: 100 (07 Sep 2022 20:00)  HR: 75 (08 Sep 2022 18:00) (55 - 88)  BP: --  BP(mean): --  RR: 20 (08 Sep 2022 18:00) (12 - 44)  SpO2: 100% (08 Sep 2022 18:00) (89% - 100%)    Parameters below as of 08 Sep 2022 16:00  Patient On (Oxygen Delivery Method): mask, aerosol    O2 Concentration (%): 40    Physical Exam:   General: sedated and intubated   Neurology: sedated   Eyes: bilateral pupils equal and reactive   ENT/Neck: Neck supple, trachea midline, No JVD, +ETT midline   Respiratory: Clear bilaterally   CV: S1S2, no murmurs        [x] Sternal dressing, [x] Mediastinal CT,        [x] Sinus rhythm,  [x] Temporary pacing, - VVI 50  Abdominal: Soft, NT, ND +BS   Extremities: 1-2+ pedal edema noted, + peripheral pulses   Skin: No Rashes, Hematoma, Ecchymosis                            Assessment:  71F PMH DM, COPD, Chronic Adrenal Insufficiency on Chronic prednisone, history of colorectal cancer s/p resection (colostomy bag), Hx of CAD, Chronic A fib on Eliquis, and tracheomalacia and multiple intubations, recent dx of OM presented to ED at [facility] this morning with epigastric pain, belching and central chest pain. Found on CTA to have type A aortic dissection and transferred to [facility] for surgical evaluation by Dr. Cabrera.     Ascending aortic dissection s/p modified bentall and hemiarch on 9/6   Hemodynamic instability  Cardiogenic shock   Hypovolemic shock   Post op respiratory insufficiency  Acute blood loss anemia    Plan:   ***Neuro***   Addressed analgesic regimen to optimize function and sedated with IV Precedex  for ventilatory synchrony.      ***Cardiovascular**  Patient with a Type A aortic dissection underwent aortic dissection repair with a modified bentall and hemiarch on 9/6. Patient is on inotropic support with IV Primacor infusion for systolic heart failure. She requires blood pressure management with Cardene and Labetalol, as well as, Mexiletine for rate control.  Invasive hemodynamic monitoring with a central venous catheter & an A-line were required for the continuous central venous and MAP/BP monitoring to ensure adequate cardiovascular support. Temporary pacing wires, VVI 50, in place.      ***Pulmonary***  Respiratory status required full ventilatory support, close monitoring of respiratory rate and breathing pattern, the following of ABG’s with A-line monitoring, continuous pulse oximetry monitoring. Patient with a history of multiple intubations was intubated yesterday and was subsequently extubated later that day. However, the patient became aggressive and agitated, thus, the patient required reintubation today.        Mode: AC/ CMV (Assist Control/ Continuous Mandatory Ventilation)  RR (machine): 14  TV (machine): 500  FiO2: 50  PEEP: 5  ITime: 1  MAP: 13  PIP: 28              ***Heme***  Anemia due to acute blood loss, no current plan for transfusion. Continue to monitor hemoglobin and hematocrit levels. In the OR on 9/6, the patient was given 1000cc FEIBA, 2000cc K-Centra, 3U PRBC, 2U Cryo, 2U FFP, and 2 Platelets.        ***GI***  Patient requires Protonix for stress ulcer prophylaxis and Reglan for gut motility. Patient is currently tolerating 30 cc/hr of TF Glucerna 1.5 Chago; will plan to progress tube feed rate to goal rate of 40cc/hr every 4 hours.       ***Renal***  Diuresis with Lasix and Aldactone. Continue monitoring urine output, fluid balance, electrolytes, and BUN/Creatinine to avoid intravascular volume depletion.     ***ID***  Febrile, white blood cells elevated to 13.27. Continue trending white blood count and monitoring fever curve. Surgical swab on 9/6 of right heel grew Proteus mirabilis ESBL. Patient requires Vancomycin for post op prophylaxis.           ***Endocrine***  Metabolic stability, history of diabetes mellitus required an IV regular Insulin drip while following serial glucose levels to help achieve and maintain euglycemia.            Patient requires continuous monitoring with bedside rhythm monitoring, pulse oximetry monitoring, and continuous central venous and arterial pressure monitoring; and intermittent blood gas analysis. Care plan discussed with the ICU care team.   Patient remained critical, at risk for life threatening decompensation.    I have spent 30 minutes providing critical care management to this patient.    By signing my name below, I, Gisella Bailey, attest that this documentation has been prepared under the direction and in the presence of Marbella Martin MD   Electronically signed: Georgi Lozada, 09-08-22 @ 19:09    IMarbella, personally performed the services described in this documentation. all medical record entries made by the scribe were at my direction and in my presence. I have reviewed the chart and agree that the record reflects my personal performance and is accurate and complete  Electronically signed: Marbella Martin MD  HPI:  71F PMH DM, COPD, Chronic Adrenal Insufficiency on Chronic prednisone, history of colorectal cancer s/p resection (colostomy bag), Hx of CAD, Chronic A fib on Eliquis, and tracheomalacia and multiple intubations, recent dx of OM presented to ED at [facility] this morning with epigastric pain, belching and central chest pain. Found on CTA to have type A aortic dissection and transferred to [facility] for surgical evaluation by Dr. Cabrera. (06 Sep 2022 16:32)      Patient seen and examined at the bedside.    Remained critically ill on continuous ICU monitoring.    OBJECTIVE:  Vital Signs Last 24 Hrs  T(C): 37.7 (08 Sep 2022 16:00), Max: 37.8 (07 Sep 2022 20:00)  T(F): 99.9 (08 Sep 2022 16:00), Max: 100 (07 Sep 2022 20:00)  HR: 75 (08 Sep 2022 18:00) (55 - 88)  BP: --  BP(mean): --  RR: 20 (08 Sep 2022 18:00) (12 - 44)  SpO2: 100% (08 Sep 2022 18:00) (89% - 100%)    Parameters below as of 08 Sep 2022 16:00  Patient On (Oxygen Delivery Method): mask, aerosol    O2 Concentration (%): 40    Physical Exam:   General: sedated and intubated   Neurology: sedated   Eyes: bilateral pupils equal and reactive   ENT/Neck: Neck supple, trachea midline, No JVD, +ETT midline   Respiratory: Clear bilaterally   CV: S1S2, no murmurs        [x] Sternal dressing, [x] Mediastinal CT,        [x] Sinus rhythm,  [x] Temporary pacing, - VVI 50  Abdominal: Soft, NT, ND +BS   Extremities: 1-2+ pedal edema noted, + peripheral pulses   Skin: No Rashes, Hematoma, Ecchymosis                            Assessment:  71F PMH DM, COPD, Chronic Adrenal Insufficiency on Chronic prednisone, history of colorectal cancer s/p resection (colostomy bag), Hx of CAD, Chronic A fib on Eliquis, and tracheomalacia and multiple intubations, recent dx of OM presented to ED at [facility] this morning with epigastric pain, belching and central chest pain. Found on CTA to have type A aortic dissection and transferred to [facility] for surgical evaluation by Dr. Cabrera.     Ascending aortic dissection s/p modified bentall and hemiarch on 9/6   Hemodynamic instability  Cardiogenic shock   Hypovolemic shock   Post op respiratory insufficiency  Acute blood loss anemia    Plan:   ***Neuro***   Addressed analgesic regimen to optimize function and sedated with IV Precedex  for ventilatory synchrony.      ***Cardiovascular**  Patient with a Type A aortic dissection underwent aortic dissection repair with a modified bentall and hemiarch on 9/6. Patient has required treatment for hypertension, initially with an IV Nicardipine infusion and now transitioned to an IV Labetalol infusion. Patient has a history of chronic atrial fibrillation. Patient is continued on preoperative Mexiletine.  Invasive hemodynamic monitoring with a central venous catheter & an A-line were required for the continuous central venous and MAP/BP monitoring to ensure adequate cardiovascular support. Temporary pacing wires, VVI 50, in place.      ***Pulmonary***  Respiratory status required full ventilatory support, close monitoring of respiratory rate and breathing pattern, the following of ABG’s with A-line monitoring, and continuous pulse oximetry monitoring. Patient with a history of multiple intubations and was previously treated for tracheomalacia with recent bronchoscopy showing improvement. Patient was extubated post op day one, but developed increasingly labored breathing and due to mild delirium and depressed mental status post operatively, patient was reintubated by myself earlier this evening.        Mode: AC/ CMV (Assist Control/ Continuous Mandatory Ventilation)  RR (machine): 14  TV (machine): 500  FiO2: 50  PEEP: 5  ITime: 1  MAP: 13  PIP: 28              ***Heme***  Anemia due to acute blood loss, no current plan for transfusion. Continue to monitor hemoglobin and hematocrit levels. In the OR on 9/6, the patient was given 1000cc FEIBA, 2000cc K-Centra, 3U PRBC, 2U Cryo, 2U FFP, and 2 Platelets.        ***GI***  Patient in on treatment with protonix for stress ulcer prophylaxis and reglan for gut motility. Patient is currently tolerating 30 cc/hr of TF Glucerna 1.5 Chago; will plan to progress tube feed rate to goal rate of 40cc/hr every 4 hours.       ***Renal***  Post operatively, patient has positive fluid balance and is on treatment with Lasix and Aldactone. Continue monitoring urine output, fluid balance, electrolytes, and BUN/Creatinine to avoid intravascular volume depletion.     ***ID***  Febrile, white blood cells elevated to 13.27. Continue trending white blood count and monitoring fever curve. Surgical swab on 9/6 of right heel grew Proteus mirabilis ESBL. Patient requires Vancomycin for post op prophylaxis.           ***Endocrine***  Metabolic stability, history of diabetes mellitus required an IV regular Insulin drip while following serial glucose levels to help achieve and maintain euglycemia.            Patient requires continuous monitoring with bedside rhythm monitoring, pulse oximetry monitoring, and continuous central venous and arterial pressure monitoring; and intermittent blood gas analysis. Care plan discussed with the ICU care team.   Patient remained critical, at risk for life threatening decompensation.    I have spent 30 minutes providing critical care management to this patient.    By signing my name below, I, Gisella Bailey, attest that this documentation has been prepared under the direction and in the presence of Marbella Martin MD   Electronically signed: Georgi Lozada, 09-08-22 @ 19:09    I, Marbella Martin, personally performed the services described in this documentation. all medical record entries made by the scribe were at my direction and in my presence. I have reviewed the chart and agree that the record reflects my personal performance and is accurate and complete  Electronically signed: Marbella Martin MD  HPI:  71F PMH DM, COPD, Chronic Adrenal Insufficiency on Chronic prednisone, history of colorectal cancer s/p resection (colostomy bag), Hx of CAD, Chronic A fib on Eliquis, and tracheomalacia and multiple intubations, recent dx of OM presented to ED at [facility] this morning with epigastric pain, belching and central chest pain. Found on CTA to have type A aortic dissection and transferred to [facility] for surgical evaluation by Dr. Cabrera. (06 Sep 2022 16:32)      Patient seen and examined at the bedside.    Remained critically ill on continuous ICU monitoring.    OBJECTIVE:  Vital Signs Last 24 Hrs  T(C): 37.7 (08 Sep 2022 16:00), Max: 37.8 (07 Sep 2022 20:00)  T(F): 99.9 (08 Sep 2022 16:00), Max: 100 (07 Sep 2022 20:00)  HR: 75 (08 Sep 2022 18:00) (55 - 88)  BP: --  BP(mean): --  RR: 20 (08 Sep 2022 18:00) (12 - 44)  SpO2: 100% (08 Sep 2022 18:00) (89% - 100%)    Parameters below as of 08 Sep 2022 16:00  Patient On (Oxygen Delivery Method): mask, aerosol    O2 Concentration (%): 40    Physical Exam:   General: obese, elderly female breathing in sync with the ventilator  Neurology: sedated   Eyes: right pupil reactive to light, left prosthetic eye  ENT/Neck: Neck supple, trachea midline, No JVD, +ETT midline   Respiratory: Clear bilaterally   CV: S1S2, no murmurs        [x] Sternal dressing, [x] Mediastinal CT,        [x] Sinus rhythm,  [x] Temporary pacing, - VVI 50  Abdominal: Soft, NT, ND, colostomy in place  Extremities: 1+ pedal edema noted, 2+ UE edema,+ peripheral pulses   Skin: Dry dressing over right heel, no Rashes, Hematoma, Ecchymosis                            Assessment:  71F PMH DM, COPD, Chronic Adrenal Insufficiency on Chronic prednisone, history of colorectal cancer s/p resection (colostomy bag), Hx of CAD, Chronic A fib on Eliquis, and tracheomalacia and multiple intubations, recent dx of OM presented to ED at [facility] this morning with epigastric pain, belching and central chest pain. Found on CTA to have type A aortic dissection and transferred to [facility] for surgical evaluation by Dr. Cabrera.     Ascending aortic dissection s/p modified bentall and hemiarch on 9/6   Hemodynamic instability  Cardiogenic shock   Hypovolemic shock   Post op respiratory insufficiency  Acute blood loss anemia    Plan:   ***Neuro***   Addressed analgesic regimen to optimize function and sedated with IV Precedex  for ventilatory synchrony.      ***Cardiovascular**  Patient with a Type A aortic dissection underwent aortic dissection repair with a modified bentall and hemiarch on 9/6. Patient has required treatment for hypertension, initially with an IV Nicardipine infusion and now transitioned to an IV Labetalol infusion. Patient has a history of chronic atrial fibrillation. Patient is continued on preoperative Mexiletine.  Invasive hemodynamic monitoring with a central venous catheter & an A-line were required for the continuous central venous and MAP/BP monitoring to ensure adequate cardiovascular support. Temporary pacing wires, VVI 50, in place.      ***Pulmonary***  Respiratory status required full ventilatory support, close monitoring of respiratory rate and breathing pattern, the following of ABG’s with A-line monitoring, and continuous pulse oximetry monitoring. Patient with a history of multiple intubations and was previously treated for tracheomalacia with recent bronchoscopy showing improvement. Patient was extubated post op day one, but developed increasingly labored breathing and due to mild delirium and depressed mental status post operatively, patient was reintubated by myself earlier this evening.        Mode: AC/ CMV (Assist Control/ Continuous Mandatory Ventilation)  RR (machine): 14  TV (machine): 500  FiO2: 50  PEEP: 5  ITime: 1  MAP: 13  PIP: 28              ***Heme***  Anemia due to acute blood loss, no current plan for transfusion. Continue to monitor hemoglobin and hematocrit levels. In the OR on 9/6, the patient was given 1000cc FEIBA, 2000cc K-Centra, 3U PRBC, 2U Cryo, 2U FFP, and 2 Platelets.        ***GI***  Patient in on treatment with protonix for stress ulcer prophylaxis and reglan for gut motility. Patient is currently tolerating 30 cc/hr of TF Glucerna 1.5 Chago; will plan to progress tube feed rate to goal rate of 40cc/hr every 4 hours.       ***Renal***  Post operatively, patient is in positive fluid balance and is on treatment with Lasix and Aldactone. Continue monitoring urine output, fluid balance, electrolytes, and BUN/Creatinine to avoid intravascular volume depletion.     ***ID***  Febrile, white blood cells elevated to 13.27. Continue trending white blood count and monitoring fever curve. Surgical swab on 9/6 of right heel grew Proteus mirabilis ESBL as well as MRSA, previously completed treatment with Vancomycin and Ertapenem for osteomyelitis. Patient receiving Vancomycin for periop prophylaxis.       ***Endocrine***  Metabolic stability, history of diabetes mellitus required an IV regular Insulin drip while following serial glucose levels to help achieve and maintain euglycemia.        Patient requires continuous monitoring with bedside rhythm monitoring, pulse oximetry monitoring, and continuous central venous and arterial pressure monitoring; and intermittent blood gas analysis. Care plan discussed with the ICU care team.   Patient remained critical, at risk for life threatening decompensation.    I have spent 45 minutes, exclusive of procedures, providing critical care management to this patient.    By signing my name below, I, Gisella Bailey, attest that this documentation has been prepared under the direction and in the presence of Marbella Martin MD   Electronically signed: Georgi Lozada, 09-08-22 @ 19:09    I, Marbella Martin, personally performed the services described in this documentation. all medical record entries made by the marcyibe were at my direction and in my presence. I have reviewed the chart and agree that the record reflects my personal performance and is accurate and complete  Electronically signed: Marbella Martin MD

## 2022-09-08 NOTE — DIETITIAN INITIAL EVALUATION ADULT - PERTINENT LABORATORY DATA
09-08    146<H>  |  109<H>  |  26<H>  ----------------------------<  172<H>  4.5   |  22  |  0.71    Ca    8.6      08 Sep 2022 00:44  Phos  3.8     09-08  Mg     2.5     09-08    TPro  6.6  /  Alb  4.3  /  TBili  0.7  /  DBili  x   /  AST  82<H>  /  ALT  48<H>  /  AlkPhos  85  09-08  POCT Blood Glucose.: 149 mg/dL (09-08-22 @ 07:46)  A1C with Estimated Average Glucose Result: 9.4 % (09-06-22 @ 16:46)  A1C with Estimated Average Glucose Result: 9.2 % (07-11-22 @ 07:36)  A1C with Estimated Average Glucose Result: 8.0 % (05-10-22 @ 12:26)

## 2022-09-08 NOTE — DIETITIAN INITIAL EVALUATION ADULT - ORAL INTAKE PTA/DIET HISTORY
Unable to obtain subjective information at this time, pt on breathing mask with RN at bedside receiving care, on Precedex for sedation. Noted pt with recent hospitalization seen by RD on 7/11/22. "Reports very good appetite/PO intake PTA, follows a DM diet. States NKFA. Shellfish allergy noted in chart, likely in setting of iodine allergy."

## 2022-09-08 NOTE — DIETITIAN INITIAL EVALUATION ADULT - REASON INDICATOR FOR ASSESSMENT
Consult received for "NPO status, healed sacral pressure injury"  Information obtained from RN, previous RD notes, EMR.

## 2022-09-09 LAB
ALBUMIN SERPL ELPH-MCNC: 3.7 G/DL — SIGNIFICANT CHANGE UP (ref 3.3–5)
ALP SERPL-CCNC: 97 U/L — SIGNIFICANT CHANGE UP (ref 40–120)
ALT FLD-CCNC: 92 U/L — HIGH (ref 10–45)
ANION GAP SERPL CALC-SCNC: 9 MMOL/L — SIGNIFICANT CHANGE UP (ref 5–17)
APPEARANCE UR: CLEAR — SIGNIFICANT CHANGE UP
AST SERPL-CCNC: 81 U/L — HIGH (ref 10–40)
BACTERIA # UR AUTO: NEGATIVE — SIGNIFICANT CHANGE UP
BILIRUB SERPL-MCNC: 0.4 MG/DL — SIGNIFICANT CHANGE UP (ref 0.2–1.2)
BILIRUB UR-MCNC: NEGATIVE — SIGNIFICANT CHANGE UP
BUN SERPL-MCNC: 32 MG/DL — HIGH (ref 7–23)
CALCIUM SERPL-MCNC: 8.6 MG/DL — SIGNIFICANT CHANGE UP (ref 8.4–10.5)
CHLORIDE SERPL-SCNC: 106 MMOL/L — SIGNIFICANT CHANGE UP (ref 96–108)
CO2 SERPL-SCNC: 26 MMOL/L — SIGNIFICANT CHANGE UP (ref 22–31)
COLOR SPEC: YELLOW — SIGNIFICANT CHANGE UP
COMMENT - URINE: SIGNIFICANT CHANGE UP
CREAT SERPL-MCNC: 0.62 MG/DL — SIGNIFICANT CHANGE UP (ref 0.5–1.3)
DIFF PNL FLD: ABNORMAL
EGFR: 94 ML/MIN/1.73M2 — SIGNIFICANT CHANGE UP
EPI CELLS # UR: 4 /HPF — SIGNIFICANT CHANGE UP
GAS PNL BLDA: SIGNIFICANT CHANGE UP
GLUCOSE BLDC GLUCOMTR-MCNC: 102 MG/DL — HIGH (ref 70–99)
GLUCOSE BLDC GLUCOMTR-MCNC: 104 MG/DL — HIGH (ref 70–99)
GLUCOSE BLDC GLUCOMTR-MCNC: 104 MG/DL — HIGH (ref 70–99)
GLUCOSE BLDC GLUCOMTR-MCNC: 112 MG/DL — HIGH (ref 70–99)
GLUCOSE BLDC GLUCOMTR-MCNC: 118 MG/DL — HIGH (ref 70–99)
GLUCOSE BLDC GLUCOMTR-MCNC: 121 MG/DL — HIGH (ref 70–99)
GLUCOSE BLDC GLUCOMTR-MCNC: 122 MG/DL — HIGH (ref 70–99)
GLUCOSE BLDC GLUCOMTR-MCNC: 125 MG/DL — HIGH (ref 70–99)
GLUCOSE BLDC GLUCOMTR-MCNC: 129 MG/DL — HIGH (ref 70–99)
GLUCOSE BLDC GLUCOMTR-MCNC: 130 MG/DL — HIGH (ref 70–99)
GLUCOSE BLDC GLUCOMTR-MCNC: 131 MG/DL — HIGH (ref 70–99)
GLUCOSE BLDC GLUCOMTR-MCNC: 136 MG/DL — HIGH (ref 70–99)
GLUCOSE BLDC GLUCOMTR-MCNC: 136 MG/DL — HIGH (ref 70–99)
GLUCOSE BLDC GLUCOMTR-MCNC: 142 MG/DL — HIGH (ref 70–99)
GLUCOSE BLDC GLUCOMTR-MCNC: 153 MG/DL — HIGH (ref 70–99)
GLUCOSE BLDC GLUCOMTR-MCNC: 153 MG/DL — HIGH (ref 70–99)
GLUCOSE BLDC GLUCOMTR-MCNC: 156 MG/DL — HIGH (ref 70–99)
GLUCOSE BLDC GLUCOMTR-MCNC: 157 MG/DL — HIGH (ref 70–99)
GLUCOSE BLDC GLUCOMTR-MCNC: 158 MG/DL — HIGH (ref 70–99)
GLUCOSE BLDC GLUCOMTR-MCNC: 165 MG/DL — HIGH (ref 70–99)
GLUCOSE BLDC GLUCOMTR-MCNC: 170 MG/DL — HIGH (ref 70–99)
GLUCOSE BLDC GLUCOMTR-MCNC: 192 MG/DL — HIGH (ref 70–99)
GLUCOSE BLDC GLUCOMTR-MCNC: 96 MG/DL — SIGNIFICANT CHANGE UP (ref 70–99)
GLUCOSE SERPL-MCNC: 176 MG/DL — HIGH (ref 70–99)
GLUCOSE UR QL: NEGATIVE — SIGNIFICANT CHANGE UP
GRAM STN FLD: SIGNIFICANT CHANGE UP
HCT VFR BLD CALC: 27.2 % — LOW (ref 34.5–45)
HGB BLD-MCNC: 8.4 G/DL — LOW (ref 11.5–15.5)
HYALINE CASTS # UR AUTO: 6 /LPF — HIGH (ref 0–2)
KETONES UR-MCNC: SIGNIFICANT CHANGE UP
LEUKOCYTE ESTERASE UR-ACNC: ABNORMAL
MAGNESIUM SERPL-MCNC: 2.6 MG/DL — SIGNIFICANT CHANGE UP (ref 1.6–2.6)
MCHC RBC-ENTMCNC: 23.7 PG — LOW (ref 27–34)
MCHC RBC-ENTMCNC: 30.9 GM/DL — LOW (ref 32–36)
MCV RBC AUTO: 76.8 FL — LOW (ref 80–100)
NITRITE UR-MCNC: NEGATIVE — SIGNIFICANT CHANGE UP
NRBC # BLD: 1 /100 WBCS — HIGH (ref 0–0)
PH UR: 6 — SIGNIFICANT CHANGE UP (ref 5–8)
PHOSPHATE SERPL-MCNC: 3.3 MG/DL — SIGNIFICANT CHANGE UP (ref 2.5–4.5)
PLATELET # BLD AUTO: 152 K/UL — SIGNIFICANT CHANGE UP (ref 150–400)
POTASSIUM SERPL-MCNC: 4.3 MMOL/L — SIGNIFICANT CHANGE UP (ref 3.5–5.3)
POTASSIUM SERPL-SCNC: 4.3 MMOL/L — SIGNIFICANT CHANGE UP (ref 3.5–5.3)
PROT SERPL-MCNC: 6.3 G/DL — SIGNIFICANT CHANGE UP (ref 6–8.3)
PROT UR-MCNC: ABNORMAL
RBC # BLD: 3.54 M/UL — LOW (ref 3.8–5.2)
RBC # FLD: 25.4 % — HIGH (ref 10.3–14.5)
RBC CASTS # UR COMP ASSIST: 4 /HPF — SIGNIFICANT CHANGE UP (ref 0–4)
SODIUM SERPL-SCNC: 141 MMOL/L — SIGNIFICANT CHANGE UP (ref 135–145)
SP GR SPEC: 1.02 — SIGNIFICANT CHANGE UP (ref 1.01–1.02)
SPECIMEN SOURCE: SIGNIFICANT CHANGE UP
UROBILINOGEN FLD QL: NEGATIVE — SIGNIFICANT CHANGE UP
VANCOMYCIN TROUGH SERPL-MCNC: 12 UG/ML — SIGNIFICANT CHANGE UP (ref 10–20)
WBC # BLD: 12.16 K/UL — HIGH (ref 3.8–10.5)
WBC # FLD AUTO: 12.16 K/UL — HIGH (ref 3.8–10.5)
WBC UR QL: 60 /HPF — HIGH (ref 0–5)

## 2022-09-09 PROCEDURE — 71045 X-RAY EXAM CHEST 1 VIEW: CPT | Mod: 26

## 2022-09-09 PROCEDURE — 99291 CRITICAL CARE FIRST HOUR: CPT

## 2022-09-09 PROCEDURE — 93010 ELECTROCARDIOGRAM REPORT: CPT

## 2022-09-09 PROCEDURE — 71045 X-RAY EXAM CHEST 1 VIEW: CPT | Mod: 26,77

## 2022-09-09 PROCEDURE — 99292 CRITICAL CARE ADDL 30 MIN: CPT | Mod: 24

## 2022-09-09 RX ORDER — PROPOFOL 10 MG/ML
24.62 INJECTION, EMULSION INTRAVENOUS
Qty: 500 | Refills: 0 | Status: DISCONTINUED | OUTPATIENT
Start: 2022-09-09 | End: 2022-09-11

## 2022-09-09 RX ORDER — VECURONIUM BROMIDE 20 MG/1
10 INJECTION, POWDER, FOR SOLUTION INTRAVENOUS ONCE
Refills: 0 | Status: COMPLETED | OUTPATIENT
Start: 2022-09-09 | End: 2022-09-09

## 2022-09-09 RX ORDER — LABETALOL HCL 100 MG
1.5 TABLET ORAL
Qty: 1000 | Refills: 0 | Status: DISCONTINUED | OUTPATIENT
Start: 2022-09-09 | End: 2022-09-09

## 2022-09-09 RX ORDER — HYDROMORPHONE HYDROCHLORIDE 2 MG/ML
0.5 INJECTION INTRAMUSCULAR; INTRAVENOUS; SUBCUTANEOUS ONCE
Refills: 0 | Status: DISCONTINUED | OUTPATIENT
Start: 2022-09-09 | End: 2022-09-09

## 2022-09-09 RX ORDER — CEFEPIME 1 G/1
1000 INJECTION, POWDER, FOR SOLUTION INTRAMUSCULAR; INTRAVENOUS EVERY 8 HOURS
Refills: 0 | Status: DISCONTINUED | OUTPATIENT
Start: 2022-09-09 | End: 2022-09-10

## 2022-09-09 RX ORDER — NICARDIPINE HYDROCHLORIDE 30 MG/1
2.5 CAPSULE, EXTENDED RELEASE ORAL
Qty: 40 | Refills: 0 | Status: DISCONTINUED | OUTPATIENT
Start: 2022-09-09 | End: 2022-09-12

## 2022-09-09 RX ORDER — PIPERACILLIN AND TAZOBACTAM 4; .5 G/20ML; G/20ML
3.38 INJECTION, POWDER, LYOPHILIZED, FOR SOLUTION INTRAVENOUS EVERY 8 HOURS
Refills: 0 | Status: DISCONTINUED | OUTPATIENT
Start: 2022-09-09 | End: 2022-09-09

## 2022-09-09 RX ORDER — FUROSEMIDE 40 MG
20 TABLET ORAL DAILY
Refills: 0 | Status: DISCONTINUED | OUTPATIENT
Start: 2022-09-09 | End: 2022-09-09

## 2022-09-09 RX ORDER — ESMOLOL HCL 100MG/10ML
50 VIAL (ML) INTRAVENOUS
Qty: 2500 | Refills: 0 | Status: DISCONTINUED | OUTPATIENT
Start: 2022-09-09 | End: 2022-09-12

## 2022-09-09 RX ORDER — PIPERACILLIN AND TAZOBACTAM 4; .5 G/20ML; G/20ML
3.38 INJECTION, POWDER, LYOPHILIZED, FOR SOLUTION INTRAVENOUS ONCE
Refills: 0 | Status: DISCONTINUED | OUTPATIENT
Start: 2022-09-09 | End: 2022-09-09

## 2022-09-09 RX ORDER — ETOMIDATE 2 MG/ML
20 INJECTION INTRAVENOUS ONCE
Refills: 0 | Status: COMPLETED | OUTPATIENT
Start: 2022-09-09 | End: 2022-09-09

## 2022-09-09 RX ORDER — FUROSEMIDE 40 MG
40 TABLET ORAL ONCE
Refills: 0 | Status: COMPLETED | OUTPATIENT
Start: 2022-09-09 | End: 2022-09-09

## 2022-09-09 RX ADMIN — Medication 3 MILLILITER(S): at 23:37

## 2022-09-09 RX ADMIN — Medication 8 MILLIGRAM(S): at 05:05

## 2022-09-09 RX ADMIN — INSULIN HUMAN 3 UNIT(S)/HR: 100 INJECTION, SOLUTION SUBCUTANEOUS at 20:09

## 2022-09-09 RX ADMIN — NICARDIPINE HYDROCHLORIDE 12.5 MG/HR: 30 CAPSULE, EXTENDED RELEASE ORAL at 21:26

## 2022-09-09 RX ADMIN — CEFEPIME 100 MILLIGRAM(S): 1 INJECTION, POWDER, FOR SOLUTION INTRAMUSCULAR; INTRAVENOUS at 14:47

## 2022-09-09 RX ADMIN — MEXILETINE HYDROCHLORIDE 200 MILLIGRAM(S): 150 CAPSULE ORAL at 13:07

## 2022-09-09 RX ADMIN — HYDROMORPHONE HYDROCHLORIDE 0.5 MILLIGRAM(S): 2 INJECTION INTRAMUSCULAR; INTRAVENOUS; SUBCUTANEOUS at 13:41

## 2022-09-09 RX ADMIN — Medication 22.5 MG/MIN: at 03:03

## 2022-09-09 RX ADMIN — HYDROMORPHONE HYDROCHLORIDE 0.5 MILLIGRAM(S): 2 INJECTION INTRAMUSCULAR; INTRAVENOUS; SUBCUTANEOUS at 13:56

## 2022-09-09 RX ADMIN — Medication 40 MILLIGRAM(S): at 14:58

## 2022-09-09 RX ADMIN — Medication 3 MILLILITER(S): at 11:11

## 2022-09-09 RX ADMIN — CHLORHEXIDINE GLUCONATE 5 MILLILITER(S): 213 SOLUTION TOPICAL at 05:04

## 2022-09-09 RX ADMIN — Medication 0.5 MILLIGRAM(S): at 17:01

## 2022-09-09 RX ADMIN — MEXILETINE HYDROCHLORIDE 200 MILLIGRAM(S): 150 CAPSULE ORAL at 21:25

## 2022-09-09 RX ADMIN — NICARDIPINE HYDROCHLORIDE 12.5 MG/HR: 30 CAPSULE, EXTENDED RELEASE ORAL at 20:45

## 2022-09-09 RX ADMIN — SODIUM CHLORIDE 10 MILLILITER(S): 9 INJECTION INTRAMUSCULAR; INTRAVENOUS; SUBCUTANEOUS at 20:00

## 2022-09-09 RX ADMIN — Medication 3 MILLILITER(S): at 05:35

## 2022-09-09 RX ADMIN — Medication 250 MILLIGRAM(S): at 03:03

## 2022-09-09 RX ADMIN — Medication 0.5 MILLIGRAM(S): at 05:35

## 2022-09-09 RX ADMIN — Medication 3 MILLILITER(S): at 17:01

## 2022-09-09 RX ADMIN — DEXMEDETOMIDINE HYDROCHLORIDE IN 0.9% SODIUM CHLORIDE 6.77 MICROGRAM(S)/KG/HR: 4 INJECTION INTRAVENOUS at 21:26

## 2022-09-09 RX ADMIN — VECURONIUM BROMIDE 10 MILLIGRAM(S): 20 INJECTION, POWDER, FOR SOLUTION INTRAVENOUS at 15:38

## 2022-09-09 RX ADMIN — Medication 1 APPLICATION(S): at 13:08

## 2022-09-09 RX ADMIN — Medication 40 MILLIGRAM(S): at 05:05

## 2022-09-09 RX ADMIN — CHLORHEXIDINE GLUCONATE 1 APPLICATION(S): 213 SOLUTION TOPICAL at 05:25

## 2022-09-09 RX ADMIN — CEFEPIME 100 MILLIGRAM(S): 1 INJECTION, POWDER, FOR SOLUTION INTRAMUSCULAR; INTRAVENOUS at 21:24

## 2022-09-09 RX ADMIN — ETOMIDATE 20 MILLIGRAM(S): 2 INJECTION INTRAVENOUS at 15:38

## 2022-09-09 RX ADMIN — Medication 20.3 MICROGRAM(S)/KG/MIN: at 21:25

## 2022-09-09 RX ADMIN — MEXILETINE HYDROCHLORIDE 200 MILLIGRAM(S): 150 CAPSULE ORAL at 05:05

## 2022-09-09 RX ADMIN — SPIRONOLACTONE 25 MILLIGRAM(S): 25 TABLET, FILM COATED ORAL at 18:11

## 2022-09-09 RX ADMIN — SPIRONOLACTONE 25 MILLIGRAM(S): 25 TABLET, FILM COATED ORAL at 05:05

## 2022-09-09 RX ADMIN — CHLORHEXIDINE GLUCONATE 5 MILLILITER(S): 213 SOLUTION TOPICAL at 18:11

## 2022-09-09 RX ADMIN — PANTOPRAZOLE SODIUM 40 MILLIGRAM(S): 20 TABLET, DELAYED RELEASE ORAL at 13:07

## 2022-09-09 RX ADMIN — PROPOFOL 10 MICROGRAM(S)/KG/MIN: 10 INJECTION, EMULSION INTRAVENOUS at 21:25

## 2022-09-09 NOTE — PROGRESS NOTE ADULT - SUBJECTIVE AND OBJECTIVE BOX
CRITICAL CARE ATTENDING - CTICU    MEDICATIONS  (STANDING):  albuterol/ipratropium for Nebulization 3 milliLiter(s) Nebulizer every 6 hours  buDESOnide    Inhalation Suspension 0.5 milliGRAM(s) Inhalation every 12 hours  chlorhexidine 0.12% Liquid 5 milliLiter(s) Oral Mucosa two times a day  chlorhexidine 2% Cloths 1 Application(s) Topical <User Schedule>  collagenase Ointment 1 Application(s) Topical daily  dexMEDEtomidine Infusion 0.4 MICROgram(s)/kG/Hr (6.77 mL/Hr) IV Continuous <Continuous>  furosemide    Tablet 40 milliGRAM(s) Oral daily  insulin regular Infusion 3 Unit(s)/Hr (3 mL/Hr) IV Continuous <Continuous>  labetalol Infusion 1.5 mG/Min (22.5 mL/Hr) IV Continuous <Continuous>  labetalol Injectable 20 milliGRAM(s) IV Push once  mexiletine 200 milliGRAM(s) Oral every 8 hours  niCARdipine Infusion 5 mG/Hr (25 mL/Hr) IV Continuous <Continuous>  pantoprazole  Injectable 40 milliGRAM(s) IV Push daily  predniSONE   Tablet 8 milliGRAM(s) Oral daily  sodium chloride 0.9%. 1000 milliLiter(s) (10 mL/Hr) IV Continuous <Continuous>  spironolactone 25 milliGRAM(s) Oral every 12 hours                                    8.4    12.16 )-----------( 152      ( 09 Sep 2022 00:22 )             27.2           141  |  106  |  32<H>  ----------------------------<  176<H>  4.3   |  26  |  0.62    Ca    8.6      09 Sep 2022 00:22  Phos  3.3       Mg     2.6         TPro  6.3  /  Alb  3.7  /  TBili  0.4  /  DBili  x   /  AST  81<H>  /  ALT  92<H>  /  AlkPhos  97        PT/INR - ( 08 Sep 2022 00:44 )   PT: 14.0 sec;   INR: 1.20 ratio         PTT - ( 08 Sep 2022 00:44 )  PTT:27.5 sec    Mode: AC/ CMV (Assist Control/ Continuous Mandatory Ventilation)  RR (machine): 14  TV (machine): 500  FiO2: 40  PEEP: 5  ITime: 1  MAP: 11  PIP: 26      Daily     Daily Weight in k.8 (09 Sep 2022 00:00)       @ 07:01  -   @ 07:00  --------------------------------------------------------  IN: 2538 mL / OUT: 1665 mL / NET: 873 mL      Critically Ill patient  : [ ] preoperative ,   [ x] post operative    Requires :  [ x] Arterial Line   [x] Central Line  [ ] PA catheter  [ ] IABP  [ ] ECMO  [ ] LVAD  [ ] Ventilator  [ x] pacemaker - TPM  [ ] Impella.                      [ x] ABG's     [x ] Pulse Oxymetry Monitoring  Bedside evaluation , monitoring , treatment of hemodynamics , fluids , IVP/ IVCD meds.        Diagnosis:     POD 2 - Aortic Dissection Repair     Admitted - chest / back pain     Type A Aortic Dissection     Hypertension -     Hemodynamic lability,  instability. Requires IVCD [ ] vasopressors [x] inotropes  [x] vasodilator  [x]IVSS fluid  to maintain MAP, perfusion, C.I.     PO Mexiletine     CHF- acute [ x]   chronic [ ]    systolic [ x]   diastolic [ ]  Valvular [ ]          - Echo- EF -             [ x] RV dysfunction - post op          - Cxr-cardiomegally, edema          - Clinical-  [x ]inotropes   [ ]pressors   [x ]diuresis   [ ]IABP   [ ]ECMO   [ ]LVAD   [ ]Respiratory Failure         -     DM- IVCD Insulin     COPD     A  Fib     Colostomy - colon CA     Chest Tube Drainage / Management     Temporary pacemaker (TPM) interrogation and setting.    Sedated on IVCD Precedex     Tolerates NG / NJ feeds at [x] goal rate    [ ] trophic rate    [ ]       rate      Requires bedside physical therapy, mobilization and total [facility] care.     Hypernatremia     Fluid Overload     secretions     Swallow testing               IBon, personally performed the services described in this documentation. All medical record entries made by the scribe were at my direction and in my presence. I have reviewed the chart and agree that the record reflects my personal performance and is accurate and complete.   Bon Jiménez MD.       By signing my name below, I, Kieran Plascencia, attest that this documentation has been prepared under the direction and in the presence of Bon Jiménez MD.   Electronically Signed: Georgi Cordero 22 @ 07:49        Discussed with CT surgeon, Physician Assistant - Nurse Practitioner- Critical care medicine team.   Dicussed at  AM / PM rounds.   Chart, labs , films reviewed.    Total Time:  CRITICAL CARE ATTENDING - CTICU    MEDICATIONS  (STANDING):  albuterol/ipratropium for Nebulization 3 milliLiter(s) Nebulizer every 6 hours  buDESOnide    Inhalation Suspension 0.5 milliGRAM(s) Inhalation every 12 hours  chlorhexidine 0.12% Liquid 5 milliLiter(s) Oral Mucosa two times a day  chlorhexidine 2% Cloths 1 Application(s) Topical <User Schedule>  collagenase Ointment 1 Application(s) Topical daily  dexMEDEtomidine Infusion 0.4 MICROgram(s)/kG/Hr (6.77 mL/Hr) IV Continuous <Continuous>  furosemide    Tablet 40 milliGRAM(s) Oral daily  insulin regular Infusion 3 Unit(s)/Hr (3 mL/Hr) IV Continuous <Continuous>  labetalol Infusion 1.5 mG/Min (22.5 mL/Hr) IV Continuous <Continuous>  labetalol Injectable 20 milliGRAM(s) IV Push once  mexiletine 200 milliGRAM(s) Oral every 8 hours  niCARdipine Infusion 5 mG/Hr (25 mL/Hr) IV Continuous <Continuous>  pantoprazole  Injectable 40 milliGRAM(s) IV Push daily  predniSONE   Tablet 8 milliGRAM(s) Oral daily  sodium chloride 0.9%. 1000 milliLiter(s) (10 mL/Hr) IV Continuous <Continuous>  spironolactone 25 milliGRAM(s) Oral every 12 hours                                    8.4    12.16 )-----------( 152      ( 09 Sep 2022 00:22 )             27.2           141  |  106  |  32<H>  ----------------------------<  176<H>  4.3   |  26  |  0.62    Ca    8.6      09 Sep 2022 00:22  Phos  3.3       Mg     2.6         TPro  6.3  /  Alb  3.7  /  TBili  0.4  /  DBili  x   /  AST  81<H>  /  ALT  92<H>  /  AlkPhos  97        PT/INR - ( 08 Sep 2022 00:44 )   PT: 14.0 sec;   INR: 1.20 ratio         PTT - ( 08 Sep 2022 00:44 )  PTT:27.5 sec    Mode: AC/ CMV (Assist Control/ Continuous Mandatory Ventilation)  RR (machine): 14  TV (machine): 500  FiO2: 40  PEEP: 5  ITime: 1  MAP: 11  PIP: 26      Daily     Daily Weight in k.8 (09 Sep 2022 00:00)       @ 07:01  -   @ 07:00  --------------------------------------------------------  IN: 2538 mL / OUT: 1665 mL / NET: 873 mL      Critically Ill patient  : [ ] preoperative ,   [ x] post operative    Requires :  [ x] Arterial Line   [x] Central Line  [ ] PA catheter  [ ] IABP  [ ] ECMO  [ ] LVAD  [x] Ventilator  [ x] pacemaker - TPM  [ ] Impella.                      [ x] ABG's     [x ] Pulse Oxymetry Monitoring  Bedside evaluation , monitoring , treatment of hemodynamics , fluids , IVP/ IVCD meds.        Diagnosis:     POD 2 - Aortic Dissection Repair     Reintubated yesterday     Admitted - chest / back pain     Type A Aortic Dissection     Hypertension -     Hemodynamic lability,  instability. Requires IVCD [ ] vasopressors [x] inotropes  [x] vasodilator  [x]IVSS fluid  to maintain MAP, perfusion, C.I.     PO Mexiletine     CHF- acute [ x]   chronic [ ]    systolic [ x]   diastolic [ ]  Valvular [ ]          - Echo- EF -             [ x] RV dysfunction - post op          - Cxr-cardiomegally, edema          - Clinical-  [x ]inotropes   [ ]pressors   [x ]diuresis   [ ]IABP   [ ]ECMO   [ ]LVAD   [ ]Respiratory Failure         -     DM- IVCD Insulin     Ventilator Management:  [x]AC-rest    [ ]CPAP-PS Wean    [ ]Trach Collar     [ ]Extubate    [ ] T-Piece  [ ]peep>5     COPD     A  Fib     Colostomy - colon CA     Chest Tube Drainage / Management     Temporary pacemaker (TPM) interrogation and setting.    Sedated on IVCD Precedex     Tolerates NG / NJ feeds at [x] goal rate    [ ] trophic rate    [ ]       rate      Requires bedside physical therapy, mobilization and total long-term care.     Renal insufficiency     Hypernatremia     Fluid Overload     secretions     Swallow testing     Sputum shows numerous Gram neg plots             I, Bon Jiménez, personally performed the services described in this documentation. All medical record entries made by the scribe were at my direction and in my presence. I have reviewed the chart and agree that the record reflects my personal performance and is accurate and complete.   Bon Jiménez MD.       By signing my name below, I, Kieran Plascencia, attest that this documentation has been prepared under the direction and in the presence of Bon Jiménez MD.   Electronically Signed: Georgi Crodero 22 @ 07:49        Discussed with CT surgeon, Physician Assistant - Nurse Practitioner- Critical care medicine team.   Dicussed at  AM / PM rounds.   Chart, labs , films reviewed.    Total Time:  CRITICAL CARE ATTENDING - CTICU    MEDICATIONS  (STANDING):  albuterol/ipratropium for Nebulization 3 milliLiter(s) Nebulizer every 6 hours  buDESOnide    Inhalation Suspension 0.5 milliGRAM(s) Inhalation every 12 hours  chlorhexidine 0.12% Liquid 5 milliLiter(s) Oral Mucosa two times a day  chlorhexidine 2% Cloths 1 Application(s) Topical <User Schedule>  collagenase Ointment 1 Application(s) Topical daily  dexMEDEtomidine Infusion 0.4 MICROgram(s)/kG/Hr (6.77 mL/Hr) IV Continuous <Continuous>  furosemide    Tablet 40 milliGRAM(s) Oral daily  insulin regular Infusion 3 Unit(s)/Hr (3 mL/Hr) IV Continuous <Continuous>  labetalol Infusion 1.5 mG/Min (22.5 mL/Hr) IV Continuous <Continuous>  labetalol Injectable 20 milliGRAM(s) IV Push once  mexiletine 200 milliGRAM(s) Oral every 8 hours  niCARdipine Infusion 5 mG/Hr (25 mL/Hr) IV Continuous <Continuous>  pantoprazole  Injectable 40 milliGRAM(s) IV Push daily  predniSONE   Tablet 8 milliGRAM(s) Oral daily  sodium chloride 0.9%. 1000 milliLiter(s) (10 mL/Hr) IV Continuous <Continuous>  spironolactone 25 milliGRAM(s) Oral every 12 hours                                    8.4    12.16 )-----------( 152      ( 09 Sep 2022 00:22 )             27.2           141  |  106  |  32<H>  ----------------------------<  176<H>  4.3   |  26  |  0.62    Ca    8.6      09 Sep 2022 00:22  Phos  3.3       Mg     2.6         TPro  6.3  /  Alb  3.7  /  TBili  0.4  /  DBili  x   /  AST  81<H>  /  ALT  92<H>  /  AlkPhos  97        PT/INR - ( 08 Sep 2022 00:44 )   PT: 14.0 sec;   INR: 1.20 ratio         PTT - ( 08 Sep 2022 00:44 )  PTT:27.5 sec    Mode: AC/ CMV (Assist Control/ Continuous Mandatory Ventilation)  RR (machine): 14  TV (machine): 500  FiO2: 40  PEEP: 5  ITime: 1  MAP: 11  PIP: 26      Daily     Daily Weight in k.8 (09 Sep 2022 00:00)       @ 07:01  -   @ 07:00  --------------------------------------------------------  IN: 2538 mL / OUT: 1665 mL / NET: 873 mL      Critically Ill patient  : [ ] preoperative ,   [ x] post operative    Requires :  [ x] Arterial Line   [x] Central Line  [ ] PA catheter  [ ] IABP  [ ] ECMO  [ ] LVAD  [x] Ventilator  [ x] pacemaker - TPM  [ ] Impella.                      [ x] ABG's     [x ] Pulse Oxymetry Monitoring  Bedside evaluation , monitoring , treatment of hemodynamics , fluids , IVP/ IVCD meds.        Diagnosis:     POD 2 - Aortic Dissection Repair     Reintubated yesterday     Admitted - chest / back pain     Type A Aortic Dissection     Hypertension -     Hemodynamic lability,  instability. Requires IVCD [ ] vasopressors [x ]  Beta Blocker  [x] vasodilator  [x]IVSS fluid  to maintain MAP, perfusion, C.I.      CHF- acute [ x]   chronic [ ]    systolic [ x]   diastolic [ ]  Valvular [ ]          - Echo- EF -             [ x] RV dysfunction - post op          - Cxr-cardiomegally, edema          - Clinical-  [x ]inotropes   [ ]pressors   [x ]diuresis   [ ]IABP   [ ]ECMO   [ ]LVAD   [x ]Respiratory Failure         -     DM- IVCD Insulin     Respiratory Failure     Ventilator Management:  [x]AC-rest    [ x]CPAP-PS Wean    [ ]Trach Collar     [ ]Extubate    [ ] T-Piece  [ ]peep>5     Difficult weaning process - multiple organ system involvement in critically ill patient     COPD     A  Fib     Colostomy - colon CA     Chest Tube Drainage / Management     Temporary pacemaker (TPM) interrogation and setting.    Sedated on IVCD Precedex - agitation / ventilator synchrony     Tolerates NG / NJ feeds at [x] goal rate    [ ] trophic rate    [ ]       rate      Requires bedside physical therapy, mobilization and total [facility] care.     Renal insufficiency     Hypernatremia     Fluid Overload     secretions     Swallow testing     Sputum shows numerous Gram neg plots             I, Bon Tino, personally performed the services described in this documentation. All medical record entries made by the scribe were at my direction and in my presence. I have reviewed the chart and agree that the record reflects my personal performance and is accurate and complete.   Bon Jiménez MD.       By signing my name below, I, Kieran Plascencia, attest that this documentation has been prepared under the direction and in the presence of Bon Jiménez MD.   Electronically Signed: Georgi Cordero 22 @ 07:49        Discussed with CT surgeon, Physician Assistant - Nurse Practitioner- Critical care medicine team.   Dicussed at  AM / PM rounds.   Chart, labs , films reviewed.    Total Time:  30 min

## 2022-09-09 NOTE — PROGRESS NOTE ADULT - SUBJECTIVE AND OBJECTIVE BOX
Patient seen and examined at the bedside.    Remained critically ill on continuous ICU monitoring.    OBJECTIVE:          Physical Exam:   General: NAD   Neurology: nonfocal   Eyes: bilateral pupils equal and reactive   ENT/Neck: Neck supple, trachea midline, No JVD   Respiratory: Clear bilaterally   CV: S1S2, no murmurs        [x] Sternal dressing, [x] Mediastinal CT, [x] Pleural CT [x] Bulb         [x] Sinus rhythm, [x] Afib, [x] Temporary pacing, [x] PPM  Abdominal: Soft, NT, ND +BS   Extremities: 1-2+ pedal edema noted, + peripheral pulses   Skin: No Rashes, Hematoma, Ecchymosis                           Assessment:  71F PMH DM, COPD, Chronic Adrenal Insufficiency on Chronic prednisone, history of colorectal cancer s/p resection (colostomy bag), Hx of CAD, Chronic A fib on Eliquis, and tracheomalacia and multiple intubations, recent dx of OM presented to ED at [facility] this morning with epigastric pain, belching and central chest pain. Found on CTA to have type A aortic dissection and transferred to [facility] for surgical evaluation by Dr. Cabrera.     Ascending aortic dissection s/p modified bentall and hemiarch on 9/6   Hemodynamic instability  Cardiogenic shock   Hypovolemic shock   Lactic acidosis  Post op respiratory insufficiency  Acute blood loss anemia  Stress hyperglycemia     Plan:   ***Neuro***  Sedated with [x] Precedex and [x] Diprivan   Post operative neuro assessment     ***Cardiovascular***  Invasive hemodynamic monitoring, assess perfusion indices   TX  / CVP 12 / MAP 69 / Hct 25.4 / Lactate 1.6  [x] Temporary pacing - VVI paced at 60   [x] weaned off of primacor [x] Cardene 5mg   Continuos reassessment of hemodynamics   Mexiletine for hx of afib  Monitor chest tube outputs     Serial EKG and cardiac enzymes     ***Pulmonary***  Extubated at 4PM, tolerating CPAP   Titration of FiO2 and PEEP, follow SpO2, CXR, blood gasses   Hx of COPD / Continue bronchodilators and Solucortef               ***GI***  [x] NPO   [x] Protonix    Reglan for gut motility     ***Renal***  Continue to monitor I/Os, BUN/Creatinine.   Replete lytes PRN  Amezcua present      ***ID***  Perioperative coverage with Vancomycin     ***Endocrine***  [x]  DM : HbA1c 9.4%                - [x] Insulin gtt                - Need tight glycemic control to prevent wound infection.      Patient requires continuous monitoring with bedside rhythm monitoring, pulse oximetry monitoring, and continuous central venous and arterial pressure monitoring; and intermittent blood gas analysis. Care plan discussed with the ICU care team.   Patient remained critical, at risk for life threatening decompensation.    I have spent 40 minutes providing critical care management to this patient.    By signing my name below, I, Megan Guerra, attest that this documentation has been prepared under the direction and in the presence of Saleem Rico NP   Electronically signed: Georgi Luna, 09-07-22 @ 21:05    I, Saleem Rico NP , personally performed the services described in this documentation. all medical record entries made by the scribe were at my direction and in my presence. I have reviewed the chart and agree that the record reflects my personal performance and is accurate and complete  Electronically signed: Saleem Rico NP  Patient seen and examined at the bedside.    Remained critically ill on continuous ICU monitoring.    OBJECTIVE:    ICU Vital Signs Last 24 Hrs  T(C): 37.6 (09 Sep 2022 20:00), Max: 38.3 (09 Sep 2022 12:00)  T(F): 99.7 (09 Sep 2022 20:00), Max: 100.9 (09 Sep 2022 12:00)  HR: 66 (09 Sep 2022 23:15) (63 - 89)  BP: 110/56 (09 Sep 2022 23:00) (110/56 - 150/82)  BP(mean): 79 (09 Sep 2022 23:00) (79 - 110)  ABP: 119/53 (09 Sep 2022 23:15) (0/0 - 166/78)  ABP(mean): 73 (09 Sep 2022 23:15) (0 - 109)  RR: 20 (09 Sep 2022 23:15) (4 - 35)  SpO2: 99% (09 Sep 2022 23:15) (98% - 100%)    O2 Parameters below as of 09 Sep 2022 20:00  Patient On (Oxygen Delivery Method): ventilator    O2 Concentration (%): 40      Physical Exam:   General: NAD   Neurology: nonfocal   Eyes: bilateral pupils equal and reactive   ENT/Neck: Neck supple, trachea midline, No JVD   Respiratory: diminished  b/l   CV: S1S2, no murmurs        [x] Sternal dressing, [x] Mediastinal CT        [x] Sinus rhythm, [x] Afib, [x] Temporary pacing, [x] PPM  Abdominal: Soft, NT, ND +BS   Extremities: 1-2+ pedal edema noted, + peripheral pulses   Skin: No Rashes, Hematoma, Ecchymosis                           Assessment:  71F PMH DM, COPD, Chronic Adrenal Insufficiency on Chronic prednisone, history of colorectal cancer s/p resection (colostomy bag), Hx of CAD, Chronic A fib on Eliquis, and tracheomalacia and multiple intubations, recent dx of OM presented to ED at [facility] this morning with epigastric pain, belching and central chest pain. Found on CTA to have type A aortic dissection and transferred to [facility] for surgical evaluation by Dr. Cabrera.     Ascending aortic dissection s/p modified bentall and hemiarch on 9/6   Hemodynamic instability  Cardiogenic shock   Hypovolemic shock   Lactic acidosis  Post op respiratory insufficiency  Acute blood loss anemia  Stress hyperglycemia     Plan:   ***Neuro***  Sedated with [x] Precedex and [x] Diprivan   Post operative neuro assessment     ***Cardiovascular***  Invasive hemodynamic monitoring, assess perfusion indices   [place]  / CVP 12 / MAP 69 / Hct 25.4 / Lactate 1.6  [x] Temporary pacing - VVI paced at 50   [x] Cardene 5mg [x] Esmolol gtt   Continuos reassessment of hemodynamics   Mexiletine for hx of afib  Monitor chest tube outputs     Serial EKG and cardiac enzymes     ***Pulmonary***  Self extubated this afternoon- reintubated  AC  40/16/5/450   wean fi02 as tolerated    ***GI***  [x] Glucerna @ 60  [x] Protonix      ***Renal***  Continue to monitor I/Os, BUN/Creatinine.   Replete lytes PRN  Seven present    lasix 40 qd     ***ID***  7/7 ESBL Proteus R heel with MRSA abscess  8/8 Proteus SCx  8/9 (+) UA(LE, WBC 60, few yeast) , follow up UCx   on cefepime       ***Endocrine***  [x]  DM : HbA1c 9.4%                - [x] Insulin gtt                - Need tight glycemic control to prevent wound infection.      Patient requires continuous monitoring with bedside rhythm monitoring, pulse oximetry monitoring, and continuous central venous and arterial pressure monitoring; and intermittent blood gas analysis. Care plan discussed with the ICU care team.   Patient remained critical, at risk for life threatening decompensation.    I have spent 45 minutes providing critical care management to this patient.

## 2022-09-09 NOTE — SWALLOW BEDSIDE ASSESSMENT ADULT - SLP PERTINENT HISTORY OF CURRENT PROBLEM
Pt admitted for ascending aortic dissection. Patient went to OR for emergency type A aortic dissection repair with Dr. Cabrera. Extubated post-op on 9/7. On 9/8 pt  developed increasingly labored breathing and due to mild delirium and depressed mental status post operatively, patient was reintubated

## 2022-09-10 LAB
-  AMIKACIN: SIGNIFICANT CHANGE UP
-  AMOXICILLIN/CLAVULANIC ACID: SIGNIFICANT CHANGE UP
-  AMPICILLIN/SULBACTAM: SIGNIFICANT CHANGE UP
-  AMPICILLIN: SIGNIFICANT CHANGE UP
-  AZTREONAM: SIGNIFICANT CHANGE UP
-  CEFAZOLIN: SIGNIFICANT CHANGE UP
-  CEFEPIME: SIGNIFICANT CHANGE UP
-  CEFOXITIN: SIGNIFICANT CHANGE UP
-  CEFTRIAXONE: SIGNIFICANT CHANGE UP
-  CIPROFLOXACIN: SIGNIFICANT CHANGE UP
-  ERTAPENEM: SIGNIFICANT CHANGE UP
-  GENTAMICIN: SIGNIFICANT CHANGE UP
-  LEVOFLOXACIN: SIGNIFICANT CHANGE UP
-  MEROPENEM: SIGNIFICANT CHANGE UP
-  PIPERACILLIN/TAZOBACTAM: SIGNIFICANT CHANGE UP
-  TOBRAMYCIN: SIGNIFICANT CHANGE UP
-  TRIMETHOPRIM/SULFAMETHOXAZOLE: SIGNIFICANT CHANGE UP
ALBUMIN SERPL ELPH-MCNC: 3.2 G/DL — LOW (ref 3.3–5)
ALP SERPL-CCNC: 110 U/L — SIGNIFICANT CHANGE UP (ref 40–120)
ALT FLD-CCNC: 102 U/L — HIGH (ref 10–45)
ANION GAP SERPL CALC-SCNC: 10 MMOL/L — SIGNIFICANT CHANGE UP (ref 5–17)
AST SERPL-CCNC: 48 U/L — HIGH (ref 10–40)
BASE EXCESS BLDV CALC-SCNC: 2.4 MMOL/L — SIGNIFICANT CHANGE UP (ref -2–3)
BASOPHILS # BLD AUTO: 0 K/UL — SIGNIFICANT CHANGE UP (ref 0–0.2)
BASOPHILS NFR BLD AUTO: 0 % — SIGNIFICANT CHANGE UP (ref 0–2)
BILIRUB SERPL-MCNC: 0.4 MG/DL — SIGNIFICANT CHANGE UP (ref 0.2–1.2)
BUN SERPL-MCNC: 34 MG/DL — HIGH (ref 7–23)
CA-I SERPL-SCNC: 1.14 MMOL/L — LOW (ref 1.15–1.33)
CALCIUM SERPL-MCNC: 8.4 MG/DL — SIGNIFICANT CHANGE UP (ref 8.4–10.5)
CHLORIDE BLDV-SCNC: 107 MMOL/L — SIGNIFICANT CHANGE UP (ref 96–108)
CHLORIDE SERPL-SCNC: 108 MMOL/L — SIGNIFICANT CHANGE UP (ref 96–108)
CO2 BLDV-SCNC: 30 MMOL/L — HIGH (ref 22–26)
CO2 SERPL-SCNC: 24 MMOL/L — SIGNIFICANT CHANGE UP (ref 22–31)
CREAT SERPL-MCNC: 0.71 MG/DL — SIGNIFICANT CHANGE UP (ref 0.5–1.3)
CULTURE RESULTS: SIGNIFICANT CHANGE UP
EGFR: 90 ML/MIN/1.73M2 — SIGNIFICANT CHANGE UP
EOSINOPHIL # BLD AUTO: 0.01 K/UL — SIGNIFICANT CHANGE UP (ref 0–0.5)
EOSINOPHIL NFR BLD AUTO: 0.1 % — SIGNIFICANT CHANGE UP (ref 0–6)
GAS PNL BLDA: SIGNIFICANT CHANGE UP
GAS PNL BLDA: SIGNIFICANT CHANGE UP
GAS PNL BLDV: 139 MMOL/L — SIGNIFICANT CHANGE UP (ref 136–145)
GAS PNL BLDV: SIGNIFICANT CHANGE UP
GAS PNL BLDV: SIGNIFICANT CHANGE UP
GLUCOSE BLDC GLUCOMTR-MCNC: 110 MG/DL — HIGH (ref 70–99)
GLUCOSE BLDC GLUCOMTR-MCNC: 113 MG/DL — HIGH (ref 70–99)
GLUCOSE BLDC GLUCOMTR-MCNC: 116 MG/DL — HIGH (ref 70–99)
GLUCOSE BLDC GLUCOMTR-MCNC: 117 MG/DL — HIGH (ref 70–99)
GLUCOSE BLDC GLUCOMTR-MCNC: 121 MG/DL — HIGH (ref 70–99)
GLUCOSE BLDC GLUCOMTR-MCNC: 122 MG/DL — HIGH (ref 70–99)
GLUCOSE BLDC GLUCOMTR-MCNC: 126 MG/DL — HIGH (ref 70–99)
GLUCOSE BLDC GLUCOMTR-MCNC: 135 MG/DL — HIGH (ref 70–99)
GLUCOSE BLDC GLUCOMTR-MCNC: 135 MG/DL — HIGH (ref 70–99)
GLUCOSE BLDC GLUCOMTR-MCNC: 136 MG/DL — HIGH (ref 70–99)
GLUCOSE BLDC GLUCOMTR-MCNC: 143 MG/DL — HIGH (ref 70–99)
GLUCOSE BLDC GLUCOMTR-MCNC: 150 MG/DL — HIGH (ref 70–99)
GLUCOSE BLDC GLUCOMTR-MCNC: 153 MG/DL — HIGH (ref 70–99)
GLUCOSE BLDC GLUCOMTR-MCNC: 153 MG/DL — HIGH (ref 70–99)
GLUCOSE BLDC GLUCOMTR-MCNC: 160 MG/DL — HIGH (ref 70–99)
GLUCOSE BLDC GLUCOMTR-MCNC: 195 MG/DL — HIGH (ref 70–99)
GLUCOSE BLDC GLUCOMTR-MCNC: 209 MG/DL — HIGH (ref 70–99)
GLUCOSE BLDC GLUCOMTR-MCNC: 224 MG/DL — HIGH (ref 70–99)
GLUCOSE BLDC GLUCOMTR-MCNC: 97 MG/DL — SIGNIFICANT CHANGE UP (ref 70–99)
GLUCOSE BLDC GLUCOMTR-MCNC: 97 MG/DL — SIGNIFICANT CHANGE UP (ref 70–99)
GLUCOSE BLDV-MCNC: 148 MG/DL — HIGH (ref 70–99)
GLUCOSE SERPL-MCNC: 152 MG/DL — HIGH (ref 70–99)
HCO3 BLDV-SCNC: 28 MMOL/L — SIGNIFICANT CHANGE UP (ref 22–29)
HCT VFR BLD CALC: 28.2 % — LOW (ref 34.5–45)
HCT VFR BLDA CALC: 33 % — LOW (ref 34.5–46.5)
HGB BLD CALC-MCNC: 11 G/DL — LOW (ref 11.7–16.1)
HGB BLD-MCNC: 8.7 G/DL — LOW (ref 11.5–15.5)
HOROWITZ INDEX BLDV+IHG-RTO: 40 — SIGNIFICANT CHANGE UP
IMM GRANULOCYTES NFR BLD AUTO: 0.7 % — SIGNIFICANT CHANGE UP (ref 0–1.5)
LACTATE BLDV-MCNC: 1.2 MMOL/L — SIGNIFICANT CHANGE UP (ref 0.5–2)
LYMPHOCYTES # BLD AUTO: 0.86 K/UL — LOW (ref 1–3.3)
LYMPHOCYTES # BLD AUTO: 8 % — LOW (ref 13–44)
MAGNESIUM SERPL-MCNC: 2.2 MG/DL — SIGNIFICANT CHANGE UP (ref 1.6–2.6)
MCHC RBC-ENTMCNC: 24 PG — LOW (ref 27–34)
MCHC RBC-ENTMCNC: 30.9 GM/DL — LOW (ref 32–36)
MCV RBC AUTO: 77.9 FL — LOW (ref 80–100)
METHOD TYPE: SIGNIFICANT CHANGE UP
MONOCYTES # BLD AUTO: 1.04 K/UL — HIGH (ref 0–0.9)
MONOCYTES NFR BLD AUTO: 9.7 % — SIGNIFICANT CHANGE UP (ref 2–14)
NEUTROPHILS # BLD AUTO: 8.78 K/UL — HIGH (ref 1.8–7.4)
NEUTROPHILS NFR BLD AUTO: 81.5 % — HIGH (ref 43–77)
NRBC # BLD: 2 /100 WBCS — HIGH (ref 0–0)
ORGANISM # SPEC MICROSCOPIC CNT: SIGNIFICANT CHANGE UP
ORGANISM # SPEC MICROSCOPIC CNT: SIGNIFICANT CHANGE UP
PCO2 BLDV: 49 MMHG — HIGH (ref 39–42)
PH BLDV: 7.37 — SIGNIFICANT CHANGE UP (ref 7.32–7.43)
PHOSPHATE SERPL-MCNC: 2.7 MG/DL — SIGNIFICANT CHANGE UP (ref 2.5–4.5)
PLATELET # BLD AUTO: 166 K/UL — SIGNIFICANT CHANGE UP (ref 150–400)
PO2 BLDV: 39 MMHG — SIGNIFICANT CHANGE UP (ref 25–45)
POTASSIUM BLDV-SCNC: 4.3 MMOL/L — SIGNIFICANT CHANGE UP (ref 3.5–5.1)
POTASSIUM SERPL-MCNC: 4.5 MMOL/L — SIGNIFICANT CHANGE UP (ref 3.5–5.3)
POTASSIUM SERPL-SCNC: 4.5 MMOL/L — SIGNIFICANT CHANGE UP (ref 3.5–5.3)
PROT SERPL-MCNC: 6 G/DL — SIGNIFICANT CHANGE UP (ref 6–8.3)
RBC # BLD: 3.62 M/UL — LOW (ref 3.8–5.2)
RBC # FLD: 26.8 % — HIGH (ref 10.3–14.5)
SAO2 % BLDV: 65.2 % — LOW (ref 67–88)
SODIUM SERPL-SCNC: 142 MMOL/L — SIGNIFICANT CHANGE UP (ref 135–145)
SPECIMEN SOURCE: SIGNIFICANT CHANGE UP
WBC # BLD: 10.76 K/UL — HIGH (ref 3.8–10.5)
WBC # FLD AUTO: 10.76 K/UL — HIGH (ref 3.8–10.5)

## 2022-09-10 PROCEDURE — 99223 1ST HOSP IP/OBS HIGH 75: CPT | Mod: GC

## 2022-09-10 PROCEDURE — 99291 CRITICAL CARE FIRST HOUR: CPT

## 2022-09-10 PROCEDURE — 71045 X-RAY EXAM CHEST 1 VIEW: CPT | Mod: 26

## 2022-09-10 RX ORDER — MEROPENEM 1 G/30ML
1000 INJECTION INTRAVENOUS EVERY 8 HOURS
Refills: 0 | Status: DISCONTINUED | OUTPATIENT
Start: 2022-09-10 | End: 2022-09-14

## 2022-09-10 RX ORDER — FUROSEMIDE 40 MG
40 TABLET ORAL ONCE
Refills: 0 | Status: COMPLETED | OUTPATIENT
Start: 2022-09-10 | End: 2022-09-10

## 2022-09-10 RX ORDER — ACETAMINOPHEN 500 MG
650 TABLET ORAL EVERY 6 HOURS
Refills: 0 | Status: DISCONTINUED | OUTPATIENT
Start: 2022-09-10 | End: 2022-09-15

## 2022-09-10 RX ADMIN — CHLORHEXIDINE GLUCONATE 1 APPLICATION(S): 213 SOLUTION TOPICAL at 05:15

## 2022-09-10 RX ADMIN — MEXILETINE HYDROCHLORIDE 200 MILLIGRAM(S): 150 CAPSULE ORAL at 05:10

## 2022-09-10 RX ADMIN — Medication 0.5 MILLIGRAM(S): at 17:55

## 2022-09-10 RX ADMIN — Medication 8 MILLIGRAM(S): at 06:09

## 2022-09-10 RX ADMIN — Medication 0.5 MILLIGRAM(S): at 05:53

## 2022-09-10 RX ADMIN — SPIRONOLACTONE 25 MILLIGRAM(S): 25 TABLET, FILM COATED ORAL at 05:10

## 2022-09-10 RX ADMIN — Medication 3 MILLILITER(S): at 11:15

## 2022-09-10 RX ADMIN — MEROPENEM 100 MILLIGRAM(S): 1 INJECTION INTRAVENOUS at 21:01

## 2022-09-10 RX ADMIN — Medication 3 MILLILITER(S): at 23:14

## 2022-09-10 RX ADMIN — Medication 3 MILLILITER(S): at 17:53

## 2022-09-10 RX ADMIN — Medication 650 MILLIGRAM(S): at 17:47

## 2022-09-10 RX ADMIN — Medication 3 MILLILITER(S): at 05:53

## 2022-09-10 RX ADMIN — MEXILETINE HYDROCHLORIDE 200 MILLIGRAM(S): 150 CAPSULE ORAL at 13:18

## 2022-09-10 RX ADMIN — CHLORHEXIDINE GLUCONATE 5 MILLILITER(S): 213 SOLUTION TOPICAL at 05:11

## 2022-09-10 RX ADMIN — Medication 40 MILLIGRAM(S): at 10:09

## 2022-09-10 RX ADMIN — Medication 40 MILLIGRAM(S): at 05:10

## 2022-09-10 RX ADMIN — MEROPENEM 100 MILLIGRAM(S): 1 INJECTION INTRAVENOUS at 10:35

## 2022-09-10 RX ADMIN — MEROPENEM 100 MILLIGRAM(S): 1 INJECTION INTRAVENOUS at 13:19

## 2022-09-10 RX ADMIN — CEFEPIME 100 MILLIGRAM(S): 1 INJECTION, POWDER, FOR SOLUTION INTRAMUSCULAR; INTRAVENOUS at 06:09

## 2022-09-10 RX ADMIN — CHLORHEXIDINE GLUCONATE 5 MILLILITER(S): 213 SOLUTION TOPICAL at 17:14

## 2022-09-10 RX ADMIN — Medication 650 MILLIGRAM(S): at 18:17

## 2022-09-10 RX ADMIN — PANTOPRAZOLE SODIUM 40 MILLIGRAM(S): 20 TABLET, DELAYED RELEASE ORAL at 12:44

## 2022-09-10 RX ADMIN — SPIRONOLACTONE 25 MILLIGRAM(S): 25 TABLET, FILM COATED ORAL at 17:14

## 2022-09-10 RX ADMIN — MEXILETINE HYDROCHLORIDE 200 MILLIGRAM(S): 150 CAPSULE ORAL at 21:01

## 2022-09-10 RX ADMIN — Medication 1 APPLICATION(S): at 12:45

## 2022-09-10 NOTE — CONSULT NOTE ADULT - SUBJECTIVE AND OBJECTIVE BOX
Patient is a 72 yo F who presents with a chief complaint of type A aortic dissection (10 Sep 2022 11:04)    HPI:  71 year old F with a PMH of DM, COPD, chronic adrenal insufficiency on chronic prednisone, history of colorectal cancer s/p resection (colostomy bag), CAD, AF on Eliquis, tracheomalacia, recently treated R foot OM, initially presented to the ED with CP, found to have type A aortic dissection, s/p modified Bentall and hemiarch on 22.     Post op course complicated by shock, respiratory failure, anemia and hyperglycemia. ID consulted as pt spiking fevers, was on Zosyn, found to have + UA, changed to Cefepime which was broadened to Meropenem on 9/10.     Last seen in July by ID for R foot OM (MRSA, ESBL Proteus), treated with IV Vancomycin and Ertapenem through  for residual OM after intervention by Podiatry.      REVIEW OF SYSTEMS  Intubated     prior hospital charts reviewed [V]  primary team notes reviewed [V]  other consultant notes reviewed [V]    PAST MEDICAL & SURGICAL HISTORY:  Atrial fibrillation  paroxysmal, on eliquis    Diabetes  Type 2    COPD (chronic obstructive pulmonary disease)    Adrenal insufficiency  Medrol daily for over 50 years    Aortic insufficiency  moderate AR on echo 5/3/2018    Pelvic fracture    Asthma    Tracheobronchomalacia  diagnosed , s/p bronchial thermoplasty  (Dr Zapien); recent bronchoscopy 2018 revealed no evidence of tracheobronchomalacia in trachea or bronchial tubes    Colorectal cancer  2018- last treatment , chemo and radiation    Rectal bleeding    Seizure  x 1 18    DVT (deep venous thrombosis)  15-20 years ago, took coumadin    TIA (transient ischemic attack)  multiple, last 5 years ago - presents as right-sided weakness    History of partial hysterectomy  30 years ago - fibroids    H/O total knee replacement, bilateral  5 years ago    History of sinus surgery  multiple sinus surgeries    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    H/O pelvic surgery  5 years ago - s/p fracture    History of tracheomalacia   - attempted tracheal stenting ([facility])- course complicated by obstruction, respiratory failure, multiple CPR attempts -  stent discontinued; 10/20/2016 Tracheobronchoplasty (Prolene Mesh) performed at [facility] by Dr Zapien    S/P bronchoscopy  2018 - [facility] (Dr Zapien) no evidence of tracheobronchomalacia in trachea or bronchial tubes    Rectal bleeding  exam under anesthesia ([facility]) 2018    SOCIAL HISTORY:  - Denied smoking/vaping/alcohol/recreational drug use    FAMILY HISTORY:  Family history of asthma    Family history of breast cancer (Sibling)    Family history of diabetes mellitus type II    Allergies  aspirin (Short breath)  Avelox (Short breath; Pruritus)  cefepime (Anaphylaxis)  codeine (Short breath)  Dilaudid (Short breath)  iodine (Short breath; Swelling)  penicillin (Anaphylaxis)  shellfish (Anaphylaxis)  tetanus toxoid (Short breath)  Valium (Short breath)    ANTIMICROBIALS:  meropenem  IVPB 1000 every 8 hours    ANTIMICROBIALS (past 90 days):  MEDICATIONS  (STANDING):  cefepime   IVPB   100 mL/Hr IV Intermittent (09-10-22 @ 06:09)   100 mL/Hr IV Intermittent (22 @ 21:24)   100 mL/Hr IV Intermittent (22 @ 14:47)    meropenem  IVPB   100 mL/Hr IV Intermittent (09-10-22 @ 13:19)   100 mL/Hr IV Intermittent (09-10-22 @ 10:35)    vancomycin  IVPB   250 mL/Hr IV Intermittent (22 @ 03:03)   250 mL/Hr IV Intermittent (22 @ 13:08)   250 mL/Hr IV Intermittent (22 @ 13:19)    vancomycin  IVPB   250 mL/Hr IV Intermittent (22 @ 02:00)    OTHER MEDS:   MEDICATIONS  (STANDING):  albuterol/ipratropium for Nebulization 3 every 6 hours  buDESOnide    Inhalation Suspension 0.5 every 12 hours  dexMEDEtomidine Infusion 0.4 <Continuous>  esMOLOL  Infusion 50 <Continuous>  furosemide    Tablet 40 daily  insulin regular Infusion 3 <Continuous>  mexiletine 200 every 8 hours  niCARdipine Infusion 2.5 <Continuous>  pantoprazole  Injectable 40 daily  predniSONE   Tablet 8 daily  propofol Infusion 24.618 <Continuous>  spironolactone 25 every 12 hours    VITALS:  Vital Signs Last 24 Hrs  T(F): 100.4 (09-10-22 @ 12:00), Max: 100.9 (22 @ 12:00)    Vital Signs Last 24 Hrs  HR: 74 (09-10-22 @ 15:00) (63 - 92)  BP: 113/57 (09-10-22 @ 07:00) (105/55 - 150/82)  RR: 24 (09-10-22 @ 15:00)  SpO2: 100% (09-10-22 @ 15:00) (98% - 100%)  Wt(kg): --    EXAM:  General: Patient intubated, sedated    HEENT: NCAT, ETT in place, R IJ in place    CV: S1+S2  Lungs: On ventilator   Abd: Soft, nontender, colostomy   Ext: No cyanosis  Neuro: Unable to assess  Skin: No rash   IV: No phlebitis    Labs:                        8.7    10.76 )-----------( 166      ( 10 Sep 2022 01:06 )             28.2     09-10    142  |  108  |  34<H>  ----------------------------<  152<H>  4.5   |  24  |  0.71    Ca    8.4      10 Sep 2022 01:05  Phos  2.7     09-10  Mg     2.2     09-10    TPro  6.0  /  Alb  3.2<L>  /  TBili  0.4  /  DBili  x   /  AST  48<H>  /  ALT  102<H>  /  AlkPhos  110  10    WBC Trend:  WBC Count: 10.76 (09-10-22 @ 01:06)  WBC Count: 12.16 (22 @ 00:22)  WBC Count: 13.27 (22 @ 18:35)  WBC Count: 19.48 (22 @ 00:44)    Auto Neutrophil #: 8.78 K/uL (09-10-22 @ 01:06)  Auto Neutrophil #: 10.37 K/uL (22 @ 16:46)  Auto Neutrophil #: 13.65 K/uL (22 @ 14:12)  Auto Neutrophil #: 14.16 K/uL (22 @ 00:32)  Auto Neutrophil #: 10.10 K/uL (22 @ 09:38)    Creatine Trend:  Creatinine, Serum: 0.71 (09-10)  Creatinine, Serum: 0.62 ()  Creatinine, Serum: 0.71 ()  Creatinine, Serum: 0.70 ()    Liver Biochemical Testing Trend:  Alanine Aminotransferase (ALT/SGPT): 102 *H* (09-10)  Alanine Aminotransferase (ALT/SGPT): 92 *H* ()  Alanine Aminotransferase (ALT/SGPT): 48 *H* ()  Alanine Aminotransferase (ALT/SGPT): 26 ()  Alanine Aminotransferase (ALT/SGPT): 28 ()  Aspartate Aminotransferase (AST/SGOT): 48 (09-10-22 @ 01:05)  Aspartate Aminotransferase (AST/SGOT): 81 (22 @ 00:22)  Aspartate Aminotransferase (AST/SGOT): 82 (22 @ 00:44)  Aspartate Aminotransferase (AST/SGOT): 55 (22 @ 02:45)  Aspartate Aminotransferase (AST/SGOT): 59 (22 @ 00:48)  Bilirubin Total, Serum: 0.4 (09-10)  Bilirubin Total, Serum: 0.4 ()  Bilirubin Total, Serum: 0.7 ()  Bilirubin Total, Serum: 1.2 ()  Bilirubin Total, Serum: 1.5 ()    Auto Eosinophil %: 0.1 % (09-10-22 @ 01:06)    Urinalysis Basic - ( 09 Sep 2022 20:27 )    Color: Yellow / Appearance: Clear / S.022 / pH: x  Gluc: x / Ketone: Trace  / Bili: Negative / Urobili: Negative   Blood: x / Protein: 30 mg/dL / Nitrite: Negative   Leuk Esterase: Large / RBC: 4 /hpf / WBC 60 /HPF   Sq Epi: x / Non Sq Epi: 4 /hpf / Bacteria: Negative    MICROBIOLOGY:  Vancomycin Level, Trough: 12.0 ( @ 00:56)  Vancomycin Level, Trough: 13.3 ( @ 12:23)  Vancomycin Level, Trough: 21.7 ( @ 00:43)    MRSA PCR Result.: NotDetec (22 @ 07:20)  MRSA PCR Result.: Detected (22 @ 09:38)  MRSA PCR Result.: NotDetec (22 @ 11:01)  MRSA PCR Result.: Detected (22 @ 01:57)  MRSA PCR Result.: Detected (21 @ 18:17)    Culture - Sputum (collected 08 Sep 2022 19:46)  Source: .Sputum Sputum  Preliminary Report:    Numerous Proteus mirabilis    Unable to evaluate further due to Proteus overgrowth    Culture - Surgical Swab (collected 2022 22:18)  Source: .Surgical Swab RIGHT HEEL  Final Report:    No growth at 5 days    Culture - Surgical Swab (collected 2022 22:18)  Source: .Surgical Swab RIGHT NAVICULAR  Final Report:    Few Proteus mirabilis ESBL    Few Methicillin Resistant Staphylococcus aureus  Organism: Proteus mirabilis ESBL  Methicillin resistant Staphylococcus aureus  Organism: Methicillin resistant Staphylococcus aureus    Sensitivities:      -  Ampicillin/Sulbactam: R <=8/4      -  Cefazolin: R 8      -  Clindamycin: S <=0.25      -  Daptomycin: S <=0.25      -  Erythromycin: R >4      -  Gentamicin: S <=1 Should not be used as monotherapy      -  Linezolid: S 2      -  Oxacillin: R >2      -  Penicillin: R >8      -  Rifampin: S <=1 Should not be used as monotherapy      -  Tetra/Doxy: S <=1      -  Trimethoprim/Sulfamethoxazole: R >/38      -  Vancomycin: S 1      Method Type: GARRETT  Organism: Proteus mirabilis ESBL    Sensitivities:      -  Amikacin: S <=16      -  Amoxicillin/Clavulanic Acid: S <=8/4      -  Ampicillin: R >16 These ampicillin results predict results for amoxicillin      -  Ampicillin/Sulbactam: R <=4/2 Enterobacter, Klebsiella aerogenes, Citrobacter, and Serratia may develop resistance during prolonged therapy (3-4 days)      -  Aztreonam: R 16      -  Cefazolin: R >16 Enterobacter, Klebsiella aerogenes, Citrobacter, and Serratia may develop resistance during prolonged therapy (3-4 days)      -  Cefepime: R 8      -  Ceftriaxone: R 8 Enterobacter, Klebsiella aerogenes, Citrobacter, and Serratia may develop resistance during prolonged therapy      -  Ciprofloxacin: R >2      -  Ertapenem: S <=0.5      -  Gentamicin: R >8      -  Levofloxacin: R >4      -  Meropenem: S <=1      -  Piperacillin/Tazobactam: R <=8      -  Tobramycin: R >8      -  Trimethoprim/Sulfamethoxazole: R >2/38      Method Type: GARRETT    Culture - Tissue with Gram Stain (collected 2022 22:18)  Source: .Tissue Other  Final Report:    Rare Proteus mirabilis ESBL  Organism: Proteus mirabilis ESBL  Organism: Proteus mirabilis ESBL    Sensitivities:      -  Amikacin: S <=16      -  Amoxicillin/Clavulanic Acid: S <=8/4      -  Ampicillin: R >16 These ampicillin results predict results for amoxicillin      -  Ampicillin/Sulbactam: R <=4/2 Enterobacter, Klebsiella aerogenes, Citrobacter, and Serratia may develop resistance during prolonged therapy (3-4 days)      -  Aztreonam: R 16      -  Cefazolin: R >16 Enterobacter, Klebsiella aerogenes, Citrobacter, and Serratia may develop resistance during prolonged therapy (3-4 days)      -  Cefepime: R 8      -  Ceftriaxone: R 32 Enterobacter, Klebsiella aerogenes, Citrobacter, and Serratia may develop resistance during prolonged therapy      -  Ciprofloxacin: R >2      -  Ertapenem: S <=0.5      -  Gentamicin: R >8      -  Levofloxacin: R >4      -  Meropenem: S <=1      -  Piperacillin/Tazobactam: R <=8      -  Tobramycin: R >8      -  Trimethoprim/Sulfamethoxazole: R >2/38      Method Type: GARRETT    Culture - Sputum (collected 2022 08:49)  Source: ET Tube ET Tube  Final Report:    Moderate Proteus mirabilis ESBL    Normal Respiratory Jessica present  Organism: Proteus mirabilis ESBL  Organism: Proteus mirabilis ESBL    Sensitivities:      -  Amikacin: S <=16      -  Amoxicillin/Clavulanic Acid: S <=8/4      -  Ampicillin: R >16 These ampicillin results predict results for amoxicillin      -  Ampicillin/Sulbactam: R 8/4 Enterobacter, Klebsiella aerogenes, Citrobacter, and Serratia may develop resistance during prolonged therapy (3-4 days)      -  Aztreonam: R >16      -  Cefazolin: R >16 Enterobacter, Klebsiella aerogenes, Citrobacter, and Serratia may develop resistance during prolonged therapy (3-4 days)      -  Cefepime: R >16      -  Ceftriaxone: R >32 Enterobacter, Klebsiella aerogenes, Citrobacter, and Serratia may develop resistance during prolonged therapy      -  Ciprofloxacin: R >2      -  Ertapenem: S <=0.5      -  Gentamicin: R >8      -  Levofloxacin: R >4      -  Meropenem: S <=1      -  Piperacillin/Tazobactam: R <=8      -  Tobramycin: R >8      -  Trimethoprim/Sulfamethoxazole: R >      Method Type: GARRETT    Culture - Blood (collected 2022 18:25)  Source: .Blood Blood-Peripheral  Final Report:    No Growth Final    Culture - Abscess with Gram Stain (collected 2022 17:49)  Source: .Abscess R foot wound  Final Report:    Numerous Proteus mirabilis ESBL    Numerous Methicillin Resistant Staphylococcus aureus    Moderate Corynebacterium species "Susceptibilities not performed"  Organism: Proteus mirabilis ESBL  Methicillin resistant Staphylococcus aureus  Organism: Methicillin resistant Staphylococcus aureus    Sensitivities:      -  Ampicillin/Sulbactam: R <=8/4      -  Cefazolin: R 16      -  Clindamycin: S <=0.25      -  Daptomycin: S 0.5      -  Erythromycin: R >4      -  Gentamicin: S <=1 Should not be used as monotherapy      -  Linezolid: S 2      -  Oxacillin: R >2      -  Penicillin: R >8      -  Rifampin: S <=1 Should not be used as monotherapy      -  Tetra/Doxy: S <=1      -  Trimethoprim/Sulfamethoxazole: R >      -  Vancomycin: S 1      Method Type: GARRETT  Organism: Proteus mirabilis ESBL    Sensitivities:      -  Amikacin: S <=16      -  Amoxicillin/Clavulanic Acid: S <=8/4      -  Ampicillin: R >16 These ampicillin results predict results for amoxicillin      -  Ampicillin/Sulbactam: R <=4/2 Enterobacter, Klebsiella aerogenes, Citrobacter, and Serratia may develop resistance during prolonged therapy (3-4 days)      -  Aztreonam: R 16      -  Cefazolin: R >16 Enterobacter, Klebsiella aerogenes, Citrobacter, and Serratia may develop resistance during prolonged therapy (3-4 days)      -  Cefepime: R 16      -  Ceftriaxone: R 32 Enterobacter, Klebsiella aerogenes, Citrobacter, and Serratia may develop resistance during prolonged therapy      -  Ciprofloxacin: R >2      -  Ertapenem: S <=0.5      -  Gentamicin: R >8      -  Levofloxacin: R >4      -  Meropenem: S <=1      -  Piperacillin/Tazobactam: R <=8      -  Tobramycin: I 8      -  Trimethoprim/Sulfamethoxazole: R >2/38      Method Type: GARRETT    Culture - Urine (collected 2022 15:44)  Source: Clean Catch Clean Catch (Midstream)  Final Report:    <10,000 CFU/mL Normal Urogenital Jessica      COVID-19 PCR: NotDetec (22 @ 08:38)    COVID-19 Lex Domain AB Interp: Positive (21 @ 08:58)  COVID-19 Nucleocapsid DYLAN AB Interp: Negative (21 @ 08:58)    Serum Pro-Brain Natriuretic Peptide: 120 ()    Blood Gas Arterial, Lactate: 1.2 (09-10 @ 14:14)  Blood Gas Arterial, Lactate: 1.0 (09-10 @ 00:15)  Blood Gas Venous - Lactate: 1.2 (09-10 @ 00:15)  Blood Gas Arterial, Lactate: 0.9 ( @ 18:54)    A1C with Estimated Average Glucose Result: 9.4 % (22 @ 16:46)  A1C with Estimated Average Glucose Result: 9.2 % (22 @ 07:36)  A1C with Estimated Average Glucose Result: 8.0 % (05-10-22 @ 12:26)  A1C with Estimated Average Glucose Result: 9.5 % (22 @ 13:50)    RADIOLOGY:  imaging below personally reviewed    < from: Xray Chest 1 View- PORTABLE-Routine (09.10.22 @ 04:14) >  FINDINGS:  Enteric tube, coursing below the diaphragm, with its tip non-visualized   below the level of the film. Endotracheal tube is within the mid trachea.   Right thoracic medication port with tip in the SVC. Right IJ central   venous catheter, with tip in the SVC. Mediastinal drains.  The heart is not accurately assessed in this AP projection. Sternotomy.  The lungs are clear. Small right pleural effusion.  There is no pneumothorax.    IMPRESSION:    Small right pleural effusion.    Lines and tubes as above.    --- End of Report ---    < end of copied text >      < from: Xray Chest 1 View- PORTABLE-Routine (22 @ 00:16) >  Frontal expiratory view of the chest shows the heart to be similar in   size. Endotracheal tube, right jugular dialysis catheter, right chest   port, sternal wires, mediastinal drains, nasogastric tube and aortic   valve ring are present.    The lungs show mild pulmonary congestion and there is no evidence of   pneumothorax nor definite pleural effusion.    IMPRESSION:  Lines as noted.    Thank you for the courtesy of this referral.    --- End of Report ---    < end of copied text > Patient is a 72 yo F who presents with a chief complaint of type A aortic dissection (10 Sep 2022 11:04)    HPI:  71 year old F with a PMH of DM, COPD, chronic adrenal insufficiency on chronic prednisone, history of colorectal cancer s/p resection (colostomy bag), CAD, AF on Eliquis, tracheomalacia, recently treated R foot OM, initially presented to the ED with CP, found to have type A aortic dissection, s/p modified Bentall and hemiarch on 22.     Post op course complicated by shock, respiratory failure, anemia and hyperglycemia. ID consulted as pt spiking fevers, was on Zosyn, found to have + UA, changed to Cefepime which was broadened to Meropenem on 9/10.     Last seen in July by ID for R foot OM (MRSA, ESBL Proteus), treated with IV Vancomycin and Ertapenem through  for residual OM after intervention by Podiatry.      REVIEW OF SYSTEMS  unable to obtain because Intubated     prior hospital charts reviewed [V]  primary team notes reviewed [V]  other consultant notes reviewed [V]    PAST MEDICAL & SURGICAL HISTORY:  Atrial fibrillation  paroxysmal, on eliquis    Diabetes  Type 2    COPD (chronic obstructive pulmonary disease)    Adrenal insufficiency  Medrol daily for over 50 years    Aortic insufficiency  moderate AR on echo 5/3/2018    Pelvic fracture    Asthma    Tracheobronchomalacia  diagnosed , s/p bronchial thermoplasty  (Dr Zapien); recent bronchoscopy 2018 revealed no evidence of tracheobronchomalacia in trachea or bronchial tubes    Colorectal cancer  2018- last treatment , chemo and radiation    Rectal bleeding    Seizure  x 1 18    DVT (deep venous thrombosis)  15-20 years ago, took coumadin    TIA (transient ischemic attack)  multiple, last 5 years ago - presents as right-sided weakness    History of partial hysterectomy  30 years ago - fibroids    H/O total knee replacement, bilateral  5 years ago    History of sinus surgery  multiple sinus surgeries    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 by Dr Zapien    S/P bronchoscopy  2018 - [facility] (Dr Zapien) no evidence of tracheobronchomalacia in trachea or bronchial tubes    Rectal bleeding  exam under anesthesia ([facility]) 2018    SOCIAL HISTORY:  lives with her sister  - Denied smoking/vaping/alcohol/recreational drug use    FAMILY HISTORY:  Family history of asthma    Family history of breast cancer (Sibling)    Family history of diabetes mellitus type II    Allergies  aspirin (Short breath)  Avelox (Short breath; Pruritus)  cefepime (Anaphylaxis)  codeine (Short breath)  Dilaudid (Short breath)  iodine (Short breath; Swelling)  penicillin (Anaphylaxis)  shellfish (Anaphylaxis)  tetanus toxoid (Short breath)  Valium (Short breath)    ANTIMICROBIALS:  meropenem  IVPB 1000 every 8 hours    ANTIMICROBIALS (past 90 days):  MEDICATIONS  (STANDING):  cefepime   IVPB   100 mL/Hr IV Intermittent (09-10-22 @ 06:09)   100 mL/Hr IV Intermittent (22 @ 21:24)   100 mL/Hr IV Intermittent (22 @ 14:47)    meropenem  IVPB   100 mL/Hr IV Intermittent (09-10-22 @ 13:19)   100 mL/Hr IV Intermittent (09-10-22 @ 10:35)    vancomycin  IVPB   250 mL/Hr IV Intermittent (22 @ 03:03)   250 mL/Hr IV Intermittent (22 @ 13:08)   250 mL/Hr IV Intermittent (22 @ 13:19)    vancomycin  IVPB   250 mL/Hr IV Intermittent (22 @ 02:00)    OTHER MEDS:   MEDICATIONS  (STANDING):  albuterol/ipratropium for Nebulization 3 every 6 hours  buDESOnide    Inhalation Suspension 0.5 every 12 hours  dexMEDEtomidine Infusion 0.4 <Continuous>  esMOLOL  Infusion 50 <Continuous>  furosemide    Tablet 40 daily  insulin regular Infusion 3 <Continuous>  mexiletine 200 every 8 hours  niCARdipine Infusion 2.5 <Continuous>  pantoprazole  Injectable 40 daily  predniSONE   Tablet 8 daily  propofol Infusion 24.618 <Continuous>  spironolactone 25 every 12 hours    VITALS:  Vital Signs Last 24 Hrs  T(F): 100.4 (09-10-22 @ 12:00), Max: 100.9 (22 @ 12:00)    Vital Signs Last 24 Hrs  HR: 74 (09-10-22 @ 15:00) (63 - 92)  BP: 113/57 (09-10-22 @ 07:00) (105/55 - 150/82)  RR: 24 (09-10-22 @ 15:00)  SpO2: 100% (09-10-22 @ 15:00) (98% - 100%)  Wt(kg): --    EXAM:  General: Patient intubated, sedated    Head: atraumatic, normocephalic  Eyes: normal sclera  ENT: NCAT, ETT   CV: S1+S2  Lungs: ET On ventilator   Abd: Soft, colostomy   : ramirez   MSK: no edema  Neuro: sedated  Skin: No rash   IV: RIJ CVC    Labs:                        8.7    10. )-----------( 166      ( 10 Sep 2022 01:06 )             28.2     09-10    142  |  108  |  34<H>  ----------------------------<  152<H>  4.5   |  24  |  0.71    Ca    8.4      10 Sep 2022 01:05  Phos  2.7     09-10  Mg     2.2     09-10    TPro  6.0  /  Alb  3.2<L>  /  TBili  0.4  /  DBili  x   /  AST  48<H>  /  ALT  102<H>  /  AlkPhos  110  09-10    WBC Trend:  WBC Count: 10.76 (09-10-22 @ 01:06)  WBC Count: 12.16 (22 @ 00:22)  WBC Count: 13.27 (22 @ 18:35)  WBC Count: 19.48 (22 @ 00:44)    Auto Neutrophil #: 8.78 K/uL (09-10-22 @ 01:06)  Auto Neutrophil #: 10.37 K/uL (22 @ 16:46)  Auto Neutrophil #: 13.65 K/uL (22 @ 14:12)  Auto Neutrophil #: 14.16 K/uL (22 @ 00:32)  Auto Neutrophil #: 10.10 K/uL (22 @ 09:38)    Creatine Trend:  Creatinine, Serum: 0.71 (09-10)  Creatinine, Serum: 0.62 ()  Creatinine, Serum: 0.71 ()  Creatinine, Serum: 0.70 ()    Liver Biochemical Testing Trend:  Alanine Aminotransferase (ALT/SGPT): 102 *H* (09-10)  Alanine Aminotransferase (ALT/SGPT): 92 *H* ()  Alanine Aminotransferase (ALT/SGPT): 48 *H* ()  Alanine Aminotransferase (ALT/SGPT): 26 ()  Alanine Aminotransferase (ALT/SGPT): 28 ()  Aspartate Aminotransferase (AST/SGOT): 48 (09-10-22 @ 01:05)  Aspartate Aminotransferase (AST/SGOT): 81 (22 @ 00:22)  Aspartate Aminotransferase (AST/SGOT): 82 (22 @ 00:44)  Aspartate Aminotransferase (AST/SGOT): 55 (22 @ 02:45)  Aspartate Aminotransferase (AST/SGOT): 59 (22 @ 00:48)  Bilirubin Total, Serum: 0.4 (09-10)  Bilirubin Total, Serum: 0.4 ()  Bilirubin Total, Serum: 0.7 ()  Bilirubin Total, Serum: 1.2 ()  Bilirubin Total, Serum: 1.5 ()    Auto Eosinophil %: 0.1 % (09-10-22 @ 01:06)    Urinalysis Basic - ( 09 Sep 2022 20:27 )    Color: Yellow / Appearance: Clear / S.022 / pH: x  Gluc: x / Ketone: Trace  / Bili: Negative / Urobili: Negative   Blood: x / Protein: 30 mg/dL / Nitrite: Negative   Leuk Esterase: Large / RBC: 4 /hpf / WBC 60 /HPF   Sq Epi: x / Non Sq Epi: 4 /hpf / Bacteria: Negative    MICROBIOLOGY:  Vancomycin Level, Trough: 12.0 ( @ 00:56)  Vancomycin Level, Trough: 13.3 ( @ 12:23)  Vancomycin Level, Trough: 21.7 ( @ 00:43)    MRSA PCR Result.: NotDetec (22 @ 07:20)  MRSA PCR Result.: Detected (22 @ 09:38)  MRSA PCR Result.: NotDetec (22 @ 11:01)  MRSA PCR Result.: Detected (22 @ 01:57)  MRSA PCR Result.: Detected (21 @ 18:17)    Culture - Sputum (collected 08 Sep 2022 19:46)  Source: .Sputum Sputum  Preliminary Report:    Numerous Proteus mirabilis    Unable to evaluate further due to Proteus overgrowth    Culture - Surgical Swab (collected 2022 22:18)  Source: .Surgical Swab RIGHT HEEL  Final Report:    No growth at 5 days    Culture - Surgical Swab (collected 2022 22:18)  Source: .Surgical Swab RIGHT NAVICULAR  Final Report:    Few Proteus mirabilis ESBL    Few Methicillin Resistant Staphylococcus aureus  Organism: Proteus mirabilis ESBL  Methicillin resistant Staphylococcus aureus  Organism: Methicillin resistant Staphylococcus aureus    Sensitivities:      -  Ampicillin/Sulbactam: R <=8/4      -  Cefazolin: R 8      -  Clindamycin: S <=0.25      -  Daptomycin: S <=0.25      -  Erythromycin: R >4      -  Gentamicin: S <=1 Should not be used as monotherapy      -  Linezolid: S 2      -  Oxacillin: R >2      -  Penicillin: R >8      -  Rifampin: S <=1 Should not be used as monotherapy      -  Tetra/Doxy: S <=1      -  Trimethoprim/Sulfamethoxazole: R >2/38      -  Vancomycin: S 1      Method Type: GARRETT  Organism: Proteus mirabilis ESBL    Sensitivities:      -  Amikacin: S <=16      -  Amoxicillin/Clavulanic Acid: S <=8/4      -  Ampicillin: R >16 These ampicillin results predict results for amoxicillin      -  Ampicillin/Sulbactam: R <=4/2 Enterobacter, Klebsiella aerogenes, Citrobacter, and Serratia may develop resistance during prolonged therapy (3-4 days)      -  Aztreonam: R 16      -  Cefazolin: R >16 Enterobacter, Klebsiella aerogenes, Citrobacter, and Serratia may develop resistance during prolonged therapy (3-4 days)      -  Cefepime: R 8      -  Ceftriaxone: R 8 Enterobacter, Klebsiella aerogenes, Citrobacter, and Serratia may develop resistance during prolonged therapy      -  Ciprofloxacin: R >2      -  Ertapenem: S <=0.5      -  Gentamicin: R >8      -  Levofloxacin: R >4      -  Meropenem: S <=1      -  Piperacillin/Tazobactam: R <=8      -  Tobramycin: R >8      -  Trimethoprim/Sulfamethoxazole: R >2/38      Method Type: GARRETT    Culture - Tissue with Gram Stain (collected 2022 22:18)  Source: .Tissue Other  Final Report:    Rare Proteus mirabilis ESBL  Organism: Proteus mirabilis ESBL  Organism: Proteus mirabilis ESBL    Sensitivities:      -  Amikacin: S <=16      -  Amoxicillin/Clavulanic Acid: S <=8/4      -  Ampicillin: R >16 These ampicillin results predict results for amoxicillin      -  Ampicillin/Sulbactam: R <=4/2 Enterobacter, Klebsiella aerogenes, Citrobacter, and Serratia may develop resistance during prolonged therapy (3-4 days)      -  Aztreonam: R 16      -  Cefazolin: R >16 Enterobacter, Klebsiella aerogenes, Citrobacter, and Serratia may develop resistance during prolonged therapy (3-4 days)      -  Cefepime: R 8      -  Ceftriaxone: R 32 Enterobacter, Klebsiella aerogenes, Citrobacter, and Serratia may develop resistance during prolonged therapy      -  Ciprofloxacin: R >2      -  Ertapenem: S <=0.5      -  Gentamicin: R >8      -  Levofloxacin: R >4      -  Meropenem: S <=1      -  Piperacillin/Tazobactam: R <=8      -  Tobramycin: R >8      -  Trimethoprim/Sulfamethoxazole: R >38      Method Type: GARRETT    Culture - Sputum (collected 2022 08:49)  Source: ET Tube ET Tube  Final Report:    Moderate Proteus mirabilis ESBL    Normal Respiratory Jessica present  Organism: Proteus mirabilis ESBL  Organism: Proteus mirabilis ESBL    Sensitivities:      -  Amikacin: S <=16      -  Amoxicillin/Clavulanic Acid: S <=8/4      -  Ampicillin: R >16 These ampicillin results predict results for amoxicillin      -  Ampicillin/Sulbactam: R 8/4 Enterobacter, Klebsiella aerogenes, Citrobacter, and Serratia may develop resistance during prolonged therapy (3-4 days)      -  Aztreonam: R >16      -  Cefazolin: R >16 Enterobacter, Klebsiella aerogenes, Citrobacter, and Serratia may develop resistance during prolonged therapy (3-4 days)      -  Cefepime: R >16      -  Ceftriaxone: R >32 Enterobacter, Klebsiella aerogenes, Citrobacter, and Serratia may develop resistance during prolonged therapy      -  Ciprofloxacin: R >2      -  Ertapenem: S <=0.5      -  Gentamicin: R >8      -  Levofloxacin: R >4      -  Meropenem: S <=1      -  Piperacillin/Tazobactam: R <=8      -  Tobramycin: R >8      -  Trimethoprim/Sulfamethoxazole: R >2/38      Method Type: GARRETT    Culture - Blood (collected 2022 18:25)  Source: .Blood Blood-Peripheral  Final Report:    No Growth Final    Culture - Abscess with Gram Stain (collected 2022 17:49)  Source: .Abscess R foot wound  Final Report:    Numerous Proteus mirabilis ESBL    Numerous Methicillin Resistant Staphylococcus aureus    Moderate Corynebacterium species "Susceptibilities not performed"  Organism: Proteus mirabilis ESBL  Methicillin resistant Staphylococcus aureus  Organism: Methicillin resistant Staphylococcus aureus    Sensitivities:      -  Ampicillin/Sulbactam: R <=8/4      -  Cefazolin: R 16      -  Clindamycin: S <=0.25      -  Daptomycin: S 0.5      -  Erythromycin: R >4      -  Gentamicin: S <=1 Should not be used as monotherapy      -  Linezolid: S 2      -  Oxacillin: R >2      -  Penicillin: R >8      -  Rifampin: S <=1 Should not be used as monotherapy      -  Tetra/Doxy: S <=1      -  Trimethoprim/Sulfamethoxazole: R >/38      -  Vancomycin: S 1      Method Type: GARRETT  Organism: Proteus mirabilis ESBL    Sensitivities:      -  Amikacin: S <=16      -  Amoxicillin/Clavulanic Acid: S <=8/4      -  Ampicillin: R >16 These ampicillin results predict results for amoxicillin      -  Ampicillin/Sulbactam: R <=4/2 Enterobacter, Klebsiella aerogenes, Citrobacter, and Serratia may develop resistance during prolonged therapy (3-4 days)      -  Aztreonam: R 16      -  Cefazolin: R >16 Enterobacter, Klebsiella aerogenes, Citrobacter, and Serratia may develop resistance during prolonged therapy (3-4 days)      -  Cefepime: R 16      -  Ceftriaxone: R 32 Enterobacter, Klebsiella aerogenes, Citrobacter, and Serratia may develop resistance during prolonged therapy      -  Ciprofloxacin: R >2      -  Ertapenem: S <=0.5      -  Gentamicin: R >8      -  Levofloxacin: R >4      -  Meropenem: S <=1      -  Piperacillin/Tazobactam: R <=8      -  Tobramycin: I 8      -  Trimethoprim/Sulfamethoxazole: R >      Method Type: GARRETT    Culture - Urine (collected 2022 15:44)  Source: Clean Catch Clean Catch (Midstream)  Final Report:    <10,000 CFU/mL Normal Urogenital Jessica      COVID-19 PCR: NotDetec (22 @ 08:38)    COVID-19 Lex Domain AB Interp: Positive (21 @ 08:58)  COVID-19 Nucleocapsid DYLAN AB Interp: Negative (21 @ 08:58)    Serum Pro-Brain Natriuretic Peptide: 120 ()    Blood Gas Arterial, Lactate: 1.2 (09-10 @ 14:14)  Blood Gas Arterial, Lactate: 1.0 (09-10 @ 00:15)  Blood Gas Venous - Lactate: 1.2 (09-10 @ 00:15)  Blood Gas Arterial, Lactate: 0.9 ( @ 18:54)    A1C with Estimated Average Glucose Result: 9.4 % (22 @ 16:46)  A1C with Estimated Average Glucose Result: 9.2 % (22 @ 07:36)  A1C with Estimated Average Glucose Result: 8.0 % (05-10-22 @ 12:26)  A1C with Estimated Average Glucose Result: 9.5 % (22 @ 13:50)    RADIOLOGY:  imaging below personally reviewed    < from: Xray Chest 1 View- PORTABLE-Routine (09.10.22 @ 04:14) >  FINDINGS:  Enteric tube, coursing below the diaphragm, with its tip non-visualized   below the level of the film. Endotracheal tube is within the mid trachea.   Right thoracic medication port with tip in the SVC. Right IJ central   venous catheter, with tip in the SVC. Mediastinal drains.  The heart is not accurately assessed in this AP projection. Sternotomy.  The lungs are clear. Small right pleural effusion.  There is no pneumothorax.    IMPRESSION:    Small right pleural effusion.    Lines and tubes as above.    --- End of Report ---    < end of copied text >      < from: Xray Chest 1 View- PORTABLE-Routine (22 @ 00:16) >  Frontal expiratory view of the chest shows the heart to be similar in   size. Endotracheal tube, right jugular dialysis catheter, right chest   port, sternal wires, mediastinal drains, nasogastric tube and aortic   valve ring are present.    The lungs show mild pulmonary congestion and there is no evidence of   pneumothorax nor definite pleural effusion.    IMPRESSION:  Lines as noted.    Thank you for the courtesy of this referral.    --- End of Report ---    < end of copied text >

## 2022-09-10 NOTE — PROGRESS NOTE ADULT - SUBJECTIVE AND OBJECTIVE BOX
CRITICAL CARE ATTENDING - CTICU    MEDICATIONS  (STANDING):  albuterol/ipratropium for Nebulization 3 milliLiter(s) Nebulizer every 6 hours  buDESOnide    Inhalation Suspension 0.5 milliGRAM(s) Inhalation every 12 hours  chlorhexidine 0.12% Liquid 5 milliLiter(s) Oral Mucosa two times a day  chlorhexidine 2% Cloths 1 Application(s) Topical <User Schedule>  collagenase Ointment 1 Application(s) Topical daily  dexMEDEtomidine Infusion 0.4 MICROgram(s)/kG/Hr (6.77 mL/Hr) IV Continuous <Continuous>  esMOLOL  Infusion 50 MICROgram(s)/kG/Min (20.3 mL/Hr) IV Continuous <Continuous>  furosemide    Tablet 40 milliGRAM(s) Oral daily  insulin regular Infusion 3 Unit(s)/Hr (3 mL/Hr) IV Continuous <Continuous>  meropenem  IVPB 1000 milliGRAM(s) IV Intermittent every 8 hours  mexiletine 200 milliGRAM(s) Oral every 8 hours  niCARdipine Infusion 2.5 mG/Hr (12.5 mL/Hr) IV Continuous <Continuous>  pantoprazole  Injectable 40 milliGRAM(s) IV Push daily  predniSONE   Tablet 8 milliGRAM(s) Oral daily  propofol Infusion 24.618 MICROgram(s)/kG/Min (10 mL/Hr) IV Continuous <Continuous>  sodium chloride 0.9%. 1000 milliLiter(s) (10 mL/Hr) IV Continuous <Continuous>  spironolactone 25 milliGRAM(s) Oral every 12 hours                                    8.7    10.76 )-----------( 166      ( 10 Sep 2022 01:06 )             28.2       -10    142  |  108  |  34<H>  ----------------------------<  152<H>  4.5   |  24  |  0.71    Ca    8.4      10 Sep 2022 01:05  Phos  2.7     09-10  Mg     2.2     -10    TPro  6.0  /  Alb  3.2<L>  /  TBili  0.4  /  DBili  x   /  AST  48<H>  /  ALT  102<H>  /  AlkPhos  110  -10          Mode: AC/ CMV (Assist Control/ Continuous Mandatory Ventilation)  RR (machine): 16  TV (machine): 450  FiO2: 40  PEEP: 5  ITime: 0.9  MAP: 10  PIP: 25      Daily     Daily Weight in k.7 (10 Sep 2022 00:00)       @ 07:  -  09-10 @ 07:00  --------------------------------------------------------  IN: 3736.2 mL / OUT: 2060 mL / NET: 1676.2 mL    09-10 @ 07:  -  09-10 @ 11:05  --------------------------------------------------------  IN: 623.5 mL / OUT: 1085 mL / NET: -461.5 mL        Critically Ill patient  : [ ] preoperative ,   [x ] post operative    Requires :  [x ] Arterial Line   [x ] Central Line  [ ] PA catheter  [ ] IABP  [ ] ECMO  [ ] LVAD  [x ] Ventilator  [x ] pacemaker - TPM [ ] Impella.                      [x ] ABG's     [ x] Pulse Oxymetry Monitoring  Bedside evaluation , monitoring , treatment of hemodynamics , fluids , IVP/ IVCD meds.        Diagnosis:     POD 4, POD 9/6 - Aortic Dissection Repair     Reintubated     Admitted - chest / back pain     Type A Aortic Dissection     Chest tube drainage / management     Requires chest PT, pulmonary toilet, ambu bagging, suctioning to maintain SaO2,  patent airway and treat atelectasis.     Difficult weaning process - multiple organ system involvement in critically ill patient     Ventilator Management:  [x]AC-rest    [ ]CPAP-PS Wean    [ ]Trach Collar     [ ]Extubate    [ ] T-Piece  [x ]peep>5    Respiratory Failure     Temporary pacemaker (TPM) interrogation and setting.     Sedated on IVCD Precedex and Propofol - agitation / ventilator synchrony     CHF- acute [ x]   chronic [ ]    systolic [ x]   diastolic [ ]  Valvular [ ]          - Echo- EF - 60            [ x] RV dysfunction - post op          - Cxr-cardiomegally, edema          - Clinical-  [ ]inotropes   [ ]pressors   [x ]diuresis   [ ]IABP   [ ]ECMO   [ ]LVAD   [x ]Respiratory Failure      Hemodynamic lability,  instability. Requires IVCD [ ] vasopressors [x ]  Beta Blocker  [x] vasodilator  [x]IVSS fluid  to maintain MAP, perfusion, C.I.      Hypertension     DM- IVCD Insulin     Fluid Overload    Prerenal azotemia    Hypovolemia    Tolerates NG / NJ feeds at [x] goal rate    [ ] trophic rate    [ ]       rate      Requires bedside physical therapy, mobilization and total [facility] care.           COPD     Colostomy - colon CA     Renal insufficiency     secretions     Swallow testing     Sputum shows numerous Gram neg plots             By signing my name below, I, Tanner Pastrana, attest that this documentation has been prepared under the direction and in the presence of Bon Jiménez MD.   Electronically Signed: Georgi Solo 09-10-22 @ 11:05      Discussed with CT surgeon, Physician Assistant - Nurse Practitioner- Critical care medicine team.   Discussed at  AM / PM rounds.   Chart, labs , films reviewed.    Cumulative Critical Care Time Given Today:  CRITICAL CARE ATTENDING - CTICU    MEDICATIONS  (STANDING):  albuterol/ipratropium for Nebulization 3 milliLiter(s) Nebulizer every 6 hours  buDESOnide    Inhalation Suspension 0.5 milliGRAM(s) Inhalation every 12 hours  chlorhexidine 0.12% Liquid 5 milliLiter(s) Oral Mucosa two times a day  chlorhexidine 2% Cloths 1 Application(s) Topical <User Schedule>  collagenase Ointment 1 Application(s) Topical daily  dexMEDEtomidine Infusion 0.4 MICROgram(s)/kG/Hr (6.77 mL/Hr) IV Continuous <Continuous>  esMOLOL  Infusion 50 MICROgram(s)/kG/Min (20.3 mL/Hr) IV Continuous <Continuous>  furosemide    Tablet 40 milliGRAM(s) Oral daily  insulin regular Infusion 3 Unit(s)/Hr (3 mL/Hr) IV Continuous <Continuous>  meropenem  IVPB 1000 milliGRAM(s) IV Intermittent every 8 hours  mexiletine 200 milliGRAM(s) Oral every 8 hours  niCARdipine Infusion 2.5 mG/Hr (12.5 mL/Hr) IV Continuous <Continuous>  pantoprazole  Injectable 40 milliGRAM(s) IV Push daily  predniSONE   Tablet 8 milliGRAM(s) Oral daily  propofol Infusion 24.618 MICROgram(s)/kG/Min (10 mL/Hr) IV Continuous <Continuous>  sodium chloride 0.9%. 1000 milliLiter(s) (10 mL/Hr) IV Continuous <Continuous>  spironolactone 25 milliGRAM(s) Oral every 12 hours                                    8.7    10.76 )-----------( 166      ( 10 Sep 2022 01:06 )             28.2       -10    142  |  108  |  34<H>  ----------------------------<  152<H>  4.5   |  24  |  0.71    Ca    8.4      10 Sep 2022 01:05  Phos  2.7     09-10  Mg     2.2     -10    TPro  6.0  /  Alb  3.2<L>  /  TBili  0.4  /  DBili  x   /  AST  48<H>  /  ALT  102<H>  /  AlkPhos  110  -10          Mode: AC/ CMV (Assist Control/ Continuous Mandatory Ventilation)  RR (machine): 16  TV (machine): 450  FiO2: 40  PEEP: 5  ITime: 0.9  MAP: 10  PIP: 25      Daily     Daily Weight in k.7 (10 Sep 2022 00:00)       @ 07:  -  09-10 @ 07:00  --------------------------------------------------------  IN: 3736.2 mL / OUT: 2060 mL / NET: 1676.2 mL    09-10 @ 07:  -  09-10 @ 11:05  --------------------------------------------------------  IN: 623.5 mL / OUT: 1085 mL / NET: -461.5 mL        Critically Ill patient  : [ ] preoperative ,   [x ] post operative    Requires :  [x ] Arterial Line   [x ] Central Line  [ ] PA catheter  [ ] IABP  [ ] ECMO  [ ] LVAD  [x ] Ventilator  [x ] pacemaker - TPM [ ] Impella.                      [x ] ABG's     [ x] Pulse Oxymetry Monitoring  Bedside evaluation , monitoring , treatment of hemodynamics , fluids , IVP/ IVCD meds.        Diagnosis:     POD 4, POD 9/6 - Aortic Dissection Repair     Reintubated     Admitted - chest / back pain     Type A Aortic Dissection     Chest tube drainage / management     Requires chest PT, pulmonary toilet, ambu bagging, suctioning to maintain SaO2,  patent airway and treat atelectasis.     Difficult weaning process - multiple organ system involvement in critically ill patient     Ventilator Management:  [x]AC-rest    [ x]CPAP-PS Wean    [ ]Trach Collar     [ ]Extubate    [ ] T-Piece  [x ]peep>5    Respiratory Failure     Temporary pacemaker (TPM) interrogation and setting.     Sedated on IVCD Precedex and Propofol - agitation / ventilator synchrony     CHF- acute [ x]   chronic [ ]    systolic [ x]   diastolic [ ]  Valvular [ ]          - Echo- EF - 60            [ x] RV dysfunction - post op          - Cxr-cardiomegally, edema          - Clinical-  [ ]inotropes   [ ]pressors   [x ]diuresis   [ ]IABP   [ ]ECMO   [ ]LVAD   [x ]Respiratory Failure      Hemodynamic lability,  instability. Requires IVCD [ ] vasopressors [x ]  Beta Blocker  [x] vasodilator  [x]IVSS fluid  to maintain MAP, perfusion, C.I.      Hypertension     DM- IVCD Insulin     Fluid Overload    Prerenal azotemia    Hypovolemia    Tolerates NG / NJ feeds at [x] goal rate    [ ] trophic rate    [ ]       rate      Requires bedside physical therapy, mobilization and total [facility] care.           COPD     Colostomy - colon CA     Chronic Renal Failure     secretions     Sputum shows numerous Gram neg rods              By signing my name below, I, Tanner Pastrana, attest that this documentation has been prepared under the direction and in the presence of Bon Jiménez MD.   Electronically Signed: Georgi Solo 09-10-22 @ 11:05    I, Bon Jiménez, personally performed the services described in this documentation. All medical record entries made by the scribe were at my direction and in my presence. I have reviewed the chart and agree that the record reflects my personal performance and is accurate and complete.   Bon Jiménez MD.       Discussed with CT surgeon, Physician Assistant - Nurse Practitioner- Critical care medicine team.   Discussed at  AM / PM rounds.   Chart, labs , films reviewed.    Cumulative Critical Care Time Given Today:  40 min

## 2022-09-10 NOTE — CONSULT NOTE ADULT - ASSESSMENT
71 year old F with a PMH of DM, COPD, chronic adrenal insufficiency on chronic prednisone, history of colorectal cancer s/p resection (colostomy bag), CAD, AF on Eliquis, tracheomalacia, recently treated R foot OM, initially presented to the ED with CP, found to have type A aortic dissection, s/p modified Bentall and hemiarch on 9/6/22.     Post op course complicated by shock, respiratory failure, anemia and hyperglycemia. ID consulted as pt spiking fevers, was on Zosyn, found to have + UA, changed to Cefepime which was broadened to Meropenem on 9/10.     Last seen in July by ID for R foot OM (MRSA, ESBL Proteus), treated with IV Vancomycin and Ertapenem through 8/17 for residual OM after intervention by Podiatry.     WORKUP  UA + for infection   UCx and BCx pending   Sputum cx with numerous Proteus Mirabilis (pt has h/o ESBL Proteus)  In past has had ESBl Proteus in R foot wound, sputum   MRSA nasal PCR swab negative     DIAGNOSIS and IMPRESSION  #UTI  #Respiratory failure  #Sputum cx with Proteus   #Fevers    RECOMMENDATIONS  -F/u UCx and BCx  -Ok to continue Meropenem for now   -Suggest CT c/a/p to evaluate for source of infection. Fevers could be from UTI but would r/o any collection or abscesses at this point   -Given pt does not have increasing FiO2 requirements or increased suctioning needs as per RN at bedside, and CXR is clear without any consolidations or opacities, doubt sputum culture indicates true VAP, likely colonizer     All recommendations are tentative pending Attending Attestation.    Raymond Moon MD, PGY4   ID Fellow  [employer] Teams Preferred  After 5pm/weekends call [phone]

## 2022-09-10 NOTE — PROGRESS NOTE ADULT - SUBJECTIVE AND OBJECTIVE BOX
HPI:  71F PMH DM, COPD, Chronic Adrenal Insufficiency on Chronic prednisone, history of colorectal cancer s/p resection (colostomy bag), Hx of CAD, Chronic A fib on Eliquis, and tracheomalacia and multiple intubations, recent dx of OM presented to ED at [facility] this morning with epigastric pain, belching and central chest pain. Found on CTA to have type A aortic dissection and transferred to [facility] for surgical evaluation by Dr. Cabrera. (06 Sep 2022 16:32)      Patient seen and examined at the bedside.    Remained critically ill on continuous ICU monitoring.    OBJECTIVE:  Vital Signs Last 24 Hrs  T(C): 38 (10 Sep 2022 20:00), Max: 38.2 (10 Sep 2022 16:00)  T(F): 100.4 (10 Sep 2022 20:00), Max: 100.8 (10 Sep 2022 16:00)  HR: 75 (10 Sep 2022 20:00) (63 - 95)  BP: 153/69 (10 Sep 2022 16:15) (105/55 - 153/69)  BP(mean): 99 (10 Sep 2022 16:15) (77 - 99)  RR: 24 (10 Sep 2022 20:00) (16 - 32)  SpO2: 100% (10 Sep 2022 20:00) (98% - 100%)    Parameters below as of 10 Sep 2022 20:00  Patient On (Oxygen Delivery Method): ventilator    O2 Concentration (%): 40    Physical Exam:   General: obese, elderly female breathing in sync with the ventilator  Neurology: sedated   Eyes: right pupil reactive to light, left prosthetic eye  ENT/Neck: Neck supple, trachea midline, No JVD, +ETT midline   Respiratory: Clear bilaterally   CV: S1S2, no murmurs        [x] Sternal dressing,        [x] Sinus rhythm,  [x] Temporary pacing, - VVI 50  Abdominal: Soft, NT, ND, colostomy in place  Extremities: 1+ pedal edema noted, 2+ UE edema,+ peripheral pulses   Skin: Dry dressing over right heel, no Rashes, Hematoma, Ecchymosis                                                Assessment:  71F PMH DM, COPD, Chronic Adrenal Insufficiency on Chronic prednisone, history of colorectal cancer s/p resection (colostomy bag), Hx of CAD, Chronic A fib on Eliquis, and tracheomalacia and multiple intubations, recent dx of OM presented to ED at [facility] this morning with epigastric pain, belching and central chest pain. Found on CTA to have type A aortic dissection and transferred to [facility] for surgical evaluation by Dr. Cabrera.     Ascending aortic dissection s/p modified bentall and hemiarch on 9/6   Hemodynamic instability  Cardiogenic shock   Hypovolemic shock   Post op respiratory insufficiency  Acute blood loss anemia       Plan:   ***Neuro***   Addressed analgesic regimen to optimize function and sedated with IV Precedex and Propofol for ventilatory synchrony.      ***Cardiovascular***  Patient with a Type A aortic dissection underwent aortic dissection repair with a modified bentall and hemiarch on 9/6. Patient has required treatment for hypertension, initially with an IV Nicardipine infusion. Patient has a history of chronic atrial fibrillation. Patient is continued on preoperative Mexiletine. Continue IV esmolol for blood pressure management. Invasive hemodynamic monitoring with a central venous catheter & an A-line were required for the continuous central venous and MAP/BP monitoring to ensure adequate cardiovascular support. Temporary pacing wires, VVI 50, in place.      ***Pulmonary***  Respiratory status required full ventilatory support, close monitoring of respiratory rate and breathing pattern, the following of ABG’s with A-line monitoring, and continuous pulse oximetry monitoring. Patient with a history of multiple intubations and was previously treated for tracheomalacia with recent bronchoscopy showing improvement. Patient was extubated post op day one, but developed increasingly labored breathing and due to mild delirium and depressed mental status post operatively, patient was reintubated by Dr. Martin on 9/8. Patient self- extubated yesterday and then was reintubated.       Mode: AC/ CMV (Assist Control/ Continuous Mandatory Ventilation)  RR (machine): 16  TV (machine): 450  FiO2: 40  PEEP: 5  ITime: 0.9  MAP: 11  PIP: 24      ***Heme***  Anemia due to acute blood loss, no current plan for transfusion. Continue to monitor hemoglobin and hematocrit levels. In the OR on 9/6, the patient was given 1000cc FEIBA, 2000cc K-Centra, 3U PRBC, 2U Cryo, 2U FFP, and 2 Platelets.        ***GI***   Patient is currently tolerating goal of 60 cc/hr of TF Glucerna 1.5 Chago. Patient in on treatment with protonix for stress ulcer prophylaxis.      ***Renal***  Post operatively, patient is in positive fluid balance and is on treatment with Lasix and Aldactone. Continue monitoring urine output, fluid balance, electrolytes, and BUN/Creatinine to avoid intravascular volume depletion.       ***ID***  Febrile, white blood cells within normal levels. Continue trending white blood count and monitoring fever curve. Surgical swab on 9/6 of right heel grew Proteus mirabilis ESBL as well as MRSA, previously completed treatment with Vancomycin and Ertapenem for osteomyelitis. Scx on 9/8 revealed Numerous Proteus mirabilis. Patient receiving Meropenem for bacteremia.       ***Endocrine***  Metabolic stability, history of diabetes mellitus required an IV regular Insulin drip while following serial glucose levels to help achieve and maintain euglycemia.            Patient requires continuous monitoring with bedside rhythm monitoring, pulse oximetry monitoring, and continuous central venous and arterial pressure monitoring; and intermittent blood gas analysis. Care plan discussed with the ICU care team.   Patient remained critical, at risk for life threatening decompensation.    I have spent 30 minutes providing critical care management to this patient.    By signing my name below, I, Maria D'Amico, attest that this documentation has been prepared under the direction and in the presence of Marbella Martin MD   Electronically signed: Rogers Burleson, 09-10-22 @ 20:32    I, Marbella Martin, personally performed the services described in this documentation. all medical record entries made by the rogers were at my direction and in my presence. I have reviewed the chart and agree that the record reflects my personal performance and is accurate and complete  Electronically signed: Marbella Martni MD  HPI:  71F PMH DM, COPD, Chronic Adrenal Insufficiency on Chronic prednisone, history of colorectal cancer s/p resection (colostomy bag), Hx of CAD, Chronic A fib on Eliquis, and tracheomalacia and multiple intubations, recent dx of OM presented to ED at [facility] this morning with epigastric pain, belching and central chest pain. Found on CTA to have type A aortic dissection and transferred to [facility] for surgical evaluation by Dr. Cabrera. (06 Sep 2022 16:32)      Patient seen and examined at the bedside.    Remained critically ill on continuous ICU monitoring.    OBJECTIVE:  Vital Signs Last 24 Hrs  T(C): 38 (10 Sep 2022 20:00), Max: 38.2 (10 Sep 2022 16:00)  T(F): 100.4 (10 Sep 2022 20:00), Max: 100.8 (10 Sep 2022 16:00)  HR: 75 (10 Sep 2022 20:00) (63 - 95)  BP: 153/69 (10 Sep 2022 16:15) (105/55 - 153/69)  BP(mean): 99 (10 Sep 2022 16:15) (77 - 99)  RR: 24 (10 Sep 2022 20:00) (16 - 32)  SpO2: 100% (10 Sep 2022 20:00) (98% - 100%)    Parameters below as of 10 Sep 2022 20:00  Patient On (Oxygen Delivery Method): ventilator    O2 Concentration (%): 40    Physical Exam:   General: obese, elderly female breathing in sync with the ventilator  Neurology: sedated, but opens eyes, not following commands  Eyes: right pupil reactive to light, left prosthetic eye  ENT/Neck: Neck supple, trachea midline, No JVD, +ETT midline   Respiratory: Clear bilaterally   CV: S1S2, no murmurs        [x] Sternal dressing,        [x] Sinus rhythm,  [x] Temporary pacing, - VVI 50  Abdominal: Soft, NT, ND, colostomy in place  Extremities: 1+ pedal edema noted, 2+ UE edema,+ peripheral pulses   Skin: Dry dressing over right heel, no Rashes, Hematoma, Ecchymosis                                                Assessment:  71F PMH DM, COPD, Chronic Adrenal Insufficiency on Chronic prednisone, history of colorectal cancer s/p resection (colostomy bag), Hx of CAD, Chronic A fib on Eliquis, and tracheomalacia and multiple intubations, recent dx of OM presented to ED at [facility] this morning with epigastric pain, belching and central chest pain. Found on CTA to have type A aortic dissection and transferred to [facility] for surgical evaluation by Dr. Cabrera.     Ascending aortic dissection s/p modified bentall and hemiarch on 9/6   Hemodynamic instability  Cardiogenic shock   Hypovolemic shock   Post op respiratory insufficiency  Acute blood loss anemia       Plan:   ***Neuro***  Addressed analgesic regimen to optimize function and sedated with IV Precedex and Propofol for ventilatory synchrony and to prevent self extubation.      ***Cardiovascular***  Patient with a Type A aortic dissection underwent aortic dissection repair with a modified bentall and hemiarch on 9/6. Patient has required treatment for hypertension, currently on an IV esmolol infusion. Patient has a history of chronic atrial fibrillation. Patient is continued on preoperative Mexiletine. Invasive hemodynamic monitoring with a central venous catheter & an A-line were required for the continuous central venous and MAP/BP monitoring to ensure adequate cardiovascular support. Temporary pacing wires, VVI 50, in place.      ***Pulmonary***  Respiratory status required full ventilatory support, close monitoring of respiratory rate and breathing pattern, the following of ABG’s with A-line monitoring, and continuous pulse oximetry monitoring. Patient with a history of multiple intubations and was previously treated for tracheomalacia with recent bronchoscopy showing improvement. Patient was extubated post op day one, but developed increasingly labored breathing and due to mild delirium and depressed mental status post operatively, patient was reintubated by Dr. Martin on 9/8. Patient self- extubated yesterday and then was reintubated.       Mode: AC/ CMV (Assist Control/ Continuous Mandatory Ventilation)  RR (machine): 16  TV (machine): 450  FiO2: 40  PEEP: 5  ITime: 0.9  MAP: 11  PIP: 24      ***Heme***  Anemia due to acute blood loss, no current plan for transfusion. Continue to monitor hemoglobin and hematocrit levels. In the OR on 9/6, the patient was given 1000cc FEIBA, 2000cc K-Centra, 3U PRBC, 2U Cryo, 2U FFP, and 2 Platelets.        ***GI***   Patient is currently tolerating goal Glucerna 1.5 Chago via NGT at goal rate of 60 cc/hr. Patient is on treatment with protonix for stress ulcer prophylaxis.      ***Renal***  Post operatively, patient is in positive fluid balance with peripheral edeam and is on treatment with Lasix and Aldactone. Continue monitoring urine output, fluid balance, electrolytes, and BUN/Creatinine to avoid intravascular volume depletion.       ***ID***  Febrile, white blood cells within normal levels. Continue trending white blood count and monitoring fever curve. Surgical swab on 9/6 of right heel grew Proteus mirabilis ESBL as well as MRSA, previously completed treatment with Vancomycin and Ertapenem for osteomyelitis. Scx on 9/8 revealed Numerous Proteus mirabilis. Patient receiving Meropenem empirically for fevers and previous UTI, sputum felt to be colonized but not pneumonia. ID consult appreciated.    ***Endocrine***  Metabolic stability, history of diabetes mellitus required an IV regular Insulin drip while following serial glucose levels to help achieve and maintain euglycemia.            Patient requires continuous monitoring with bedside rhythm monitoring, pulse oximetry monitoring, and continuous central venous and arterial pressure monitoring; and intermittent blood gas analysis. Care plan discussed with the ICU care team.   Patient remained critical, at risk for life threatening decompensation.    I have spent 35 minutes providing critical care management to this patient.    By signing my name below, I, Maria D'Amico, attest that this documentation has been prepared under the direction and in the presence of Marbella Martin MD   Electronically signed: Georgi Burleson, 09-10-22 @ 20:32    I, Marbella Martin, personally performed the services described in this documentation. all medical record entries made by the marcyibronaldo were at my direction and in my presence. I have reviewed the chart and agree that the record reflects my personal performance and is accurate and complete  Electronically signed: Marbella Martin MD

## 2022-09-10 NOTE — CONSULT NOTE ADULT - ATTENDING COMMENTS
71 f with DM, CAD, a-fib, asthma/COPD, Chronic Adrenal Insufficiency on steroids, history of colorectal cancer s/p resection and ostomy, tracheomalacia and multiple intubations, recent admission for sepsis, foot osteo and abscess s/p debridement and OR cx with MRSA, ESBL proteus and corynebacterium s/p 6 weeks of vanco and ertapenem which ended 8/17, now p/w chest pain, found to have  type A aortic dissection, s/p modified Bentall and hemiarch on 9/6/22.   Post op course complicated by shock, respiratory failure, anemia and hyperglycemia.   fever started 9/9, sputum cx with proteus mirabilis    fever post op for aortic dissection, sputum cx with proteus, pt was still febrile on zosyn but last wound cx that showed proteus was ESBL    * f/u the blood and urine cx  * agree with suraj for now  * f/u the proteus sensitivities  * monitor WBC/diff and renal function  * if worsening status will need chest/abd CT with contrast    The above assessment and plan was discussed with CTU    Michelle Rolon MD  contact on teams  After 5pm and on weekends call [phone]

## 2022-09-11 LAB
ALBUMIN SERPL ELPH-MCNC: 3 G/DL — LOW (ref 3.3–5)
ALP SERPL-CCNC: 95 U/L — SIGNIFICANT CHANGE UP (ref 40–120)
ALT FLD-CCNC: 75 U/L — HIGH (ref 10–45)
ANION GAP SERPL CALC-SCNC: 8 MMOL/L — SIGNIFICANT CHANGE UP (ref 5–17)
AST SERPL-CCNC: 29 U/L — SIGNIFICANT CHANGE UP (ref 10–40)
BILIRUB SERPL-MCNC: 0.5 MG/DL — SIGNIFICANT CHANGE UP (ref 0.2–1.2)
BUN SERPL-MCNC: 21 MG/DL — SIGNIFICANT CHANGE UP (ref 7–23)
CALCIUM SERPL-MCNC: 8.4 MG/DL — SIGNIFICANT CHANGE UP (ref 8.4–10.5)
CHLORIDE SERPL-SCNC: 105 MMOL/L — SIGNIFICANT CHANGE UP (ref 96–108)
CO2 SERPL-SCNC: 28 MMOL/L — SIGNIFICANT CHANGE UP (ref 22–31)
CREAT SERPL-MCNC: 0.58 MG/DL — SIGNIFICANT CHANGE UP (ref 0.5–1.3)
CULTURE RESULTS: SIGNIFICANT CHANGE UP
EGFR: 95 ML/MIN/1.73M2 — SIGNIFICANT CHANGE UP
GAS PNL BLDA: SIGNIFICANT CHANGE UP
GLUCOSE BLDC GLUCOMTR-MCNC: 106 MG/DL — HIGH (ref 70–99)
GLUCOSE BLDC GLUCOMTR-MCNC: 107 MG/DL — HIGH (ref 70–99)
GLUCOSE BLDC GLUCOMTR-MCNC: 116 MG/DL — HIGH (ref 70–99)
GLUCOSE BLDC GLUCOMTR-MCNC: 127 MG/DL — HIGH (ref 70–99)
GLUCOSE BLDC GLUCOMTR-MCNC: 128 MG/DL — HIGH (ref 70–99)
GLUCOSE BLDC GLUCOMTR-MCNC: 129 MG/DL — HIGH (ref 70–99)
GLUCOSE BLDC GLUCOMTR-MCNC: 130 MG/DL — HIGH (ref 70–99)
GLUCOSE BLDC GLUCOMTR-MCNC: 131 MG/DL — HIGH (ref 70–99)
GLUCOSE BLDC GLUCOMTR-MCNC: 140 MG/DL — HIGH (ref 70–99)
GLUCOSE BLDC GLUCOMTR-MCNC: 144 MG/DL — HIGH (ref 70–99)
GLUCOSE BLDC GLUCOMTR-MCNC: 147 MG/DL — HIGH (ref 70–99)
GLUCOSE BLDC GLUCOMTR-MCNC: 147 MG/DL — HIGH (ref 70–99)
GLUCOSE BLDC GLUCOMTR-MCNC: 150 MG/DL — HIGH (ref 70–99)
GLUCOSE BLDC GLUCOMTR-MCNC: 155 MG/DL — HIGH (ref 70–99)
GLUCOSE BLDC GLUCOMTR-MCNC: 155 MG/DL — HIGH (ref 70–99)
GLUCOSE BLDC GLUCOMTR-MCNC: 156 MG/DL — HIGH (ref 70–99)
GLUCOSE BLDC GLUCOMTR-MCNC: 156 MG/DL — HIGH (ref 70–99)
GLUCOSE BLDC GLUCOMTR-MCNC: 157 MG/DL — HIGH (ref 70–99)
GLUCOSE BLDC GLUCOMTR-MCNC: 158 MG/DL — HIGH (ref 70–99)
GLUCOSE BLDC GLUCOMTR-MCNC: 160 MG/DL — HIGH (ref 70–99)
GLUCOSE BLDC GLUCOMTR-MCNC: 162 MG/DL — HIGH (ref 70–99)
GLUCOSE BLDC GLUCOMTR-MCNC: 179 MG/DL — HIGH (ref 70–99)
GLUCOSE BLDC GLUCOMTR-MCNC: 97 MG/DL — SIGNIFICANT CHANGE UP (ref 70–99)
GLUCOSE SERPL-MCNC: 169 MG/DL — HIGH (ref 70–99)
HCT VFR BLD CALC: 28.1 % — LOW (ref 34.5–45)
HGB BLD-MCNC: 8.7 G/DL — LOW (ref 11.5–15.5)
MAGNESIUM SERPL-MCNC: 2 MG/DL — SIGNIFICANT CHANGE UP (ref 1.6–2.6)
MCHC RBC-ENTMCNC: 24 PG — LOW (ref 27–34)
MCHC RBC-ENTMCNC: 31 GM/DL — LOW (ref 32–36)
MCV RBC AUTO: 77.4 FL — LOW (ref 80–100)
NRBC # BLD: 1 /100 WBCS — HIGH (ref 0–0)
PHOSPHATE SERPL-MCNC: 2.1 MG/DL — LOW (ref 2.5–4.5)
PLATELET # BLD AUTO: 144 K/UL — LOW (ref 150–400)
POTASSIUM SERPL-MCNC: 4.2 MMOL/L — SIGNIFICANT CHANGE UP (ref 3.5–5.3)
POTASSIUM SERPL-SCNC: 4.2 MMOL/L — SIGNIFICANT CHANGE UP (ref 3.5–5.3)
PROT SERPL-MCNC: 5.6 G/DL — LOW (ref 6–8.3)
RBC # BLD: 3.63 M/UL — LOW (ref 3.8–5.2)
RBC # FLD: SIGNIFICANT CHANGE UP (ref 10.3–14.5)
SODIUM SERPL-SCNC: 141 MMOL/L — SIGNIFICANT CHANGE UP (ref 135–145)
SPECIMEN SOURCE: SIGNIFICANT CHANGE UP
WBC # BLD: 11.51 K/UL — HIGH (ref 3.8–10.5)
WBC # FLD AUTO: 11.51 K/UL — HIGH (ref 3.8–10.5)

## 2022-09-11 PROCEDURE — 71045 X-RAY EXAM CHEST 1 VIEW: CPT | Mod: 26

## 2022-09-11 PROCEDURE — 99292 CRITICAL CARE ADDL 30 MIN: CPT

## 2022-09-11 PROCEDURE — 99291 CRITICAL CARE FIRST HOUR: CPT

## 2022-09-11 RX ORDER — NYSTATIN 500MM UNIT
500000 POWDER (EA) MISCELLANEOUS EVERY 6 HOURS
Refills: 0 | Status: DISCONTINUED | OUTPATIENT
Start: 2022-09-11 | End: 2022-10-04

## 2022-09-11 RX ADMIN — Medication 20.3 MICROGRAM(S)/KG/MIN: at 01:10

## 2022-09-11 RX ADMIN — Medication 3 MILLILITER(S): at 23:11

## 2022-09-11 RX ADMIN — MEROPENEM 100 MILLIGRAM(S): 1 INJECTION INTRAVENOUS at 13:02

## 2022-09-11 RX ADMIN — MEROPENEM 100 MILLIGRAM(S): 1 INJECTION INTRAVENOUS at 21:14

## 2022-09-11 RX ADMIN — Medication 8 MILLIGRAM(S): at 05:23

## 2022-09-11 RX ADMIN — CHLORHEXIDINE GLUCONATE 1 APPLICATION(S): 213 SOLUTION TOPICAL at 05:43

## 2022-09-11 RX ADMIN — NICARDIPINE HYDROCHLORIDE 12.5 MG/HR: 30 CAPSULE, EXTENDED RELEASE ORAL at 01:10

## 2022-09-11 RX ADMIN — MEROPENEM 100 MILLIGRAM(S): 1 INJECTION INTRAVENOUS at 05:23

## 2022-09-11 RX ADMIN — INSULIN HUMAN 3 UNIT(S)/HR: 100 INJECTION, SOLUTION SUBCUTANEOUS at 08:04

## 2022-09-11 RX ADMIN — Medication 500000 UNIT(S): at 05:51

## 2022-09-11 RX ADMIN — Medication 3 MILLILITER(S): at 18:29

## 2022-09-11 RX ADMIN — Medication 1 APPLICATION(S): at 11:21

## 2022-09-11 RX ADMIN — Medication 3 MILLILITER(S): at 05:38

## 2022-09-11 RX ADMIN — Medication 20.3 MICROGRAM(S)/KG/MIN: at 08:04

## 2022-09-11 RX ADMIN — SPIRONOLACTONE 25 MILLIGRAM(S): 25 TABLET, FILM COATED ORAL at 17:06

## 2022-09-11 RX ADMIN — Medication 40 MILLIGRAM(S): at 05:23

## 2022-09-11 RX ADMIN — SODIUM CHLORIDE 10 MILLILITER(S): 9 INJECTION INTRAMUSCULAR; INTRAVENOUS; SUBCUTANEOUS at 01:11

## 2022-09-11 RX ADMIN — PANTOPRAZOLE SODIUM 40 MILLIGRAM(S): 20 TABLET, DELAYED RELEASE ORAL at 11:21

## 2022-09-11 RX ADMIN — DEXMEDETOMIDINE HYDROCHLORIDE IN 0.9% SODIUM CHLORIDE 6.77 MICROGRAM(S)/KG/HR: 4 INJECTION INTRAVENOUS at 01:10

## 2022-09-11 RX ADMIN — Medication 500000 UNIT(S): at 12:09

## 2022-09-11 RX ADMIN — Medication 63.75 MILLIMOLE(S): at 02:11

## 2022-09-11 RX ADMIN — PROPOFOL 10 MICROGRAM(S)/KG/MIN: 10 INJECTION, EMULSION INTRAVENOUS at 01:10

## 2022-09-11 RX ADMIN — MEXILETINE HYDROCHLORIDE 200 MILLIGRAM(S): 150 CAPSULE ORAL at 21:13

## 2022-09-11 RX ADMIN — Medication 0.5 MILLIGRAM(S): at 05:38

## 2022-09-11 RX ADMIN — DEXMEDETOMIDINE HYDROCHLORIDE IN 0.9% SODIUM CHLORIDE 6.77 MICROGRAM(S)/KG/HR: 4 INJECTION INTRAVENOUS at 08:04

## 2022-09-11 RX ADMIN — NICARDIPINE HYDROCHLORIDE 12.5 MG/HR: 30 CAPSULE, EXTENDED RELEASE ORAL at 08:04

## 2022-09-11 RX ADMIN — MEXILETINE HYDROCHLORIDE 200 MILLIGRAM(S): 150 CAPSULE ORAL at 05:22

## 2022-09-11 RX ADMIN — SPIRONOLACTONE 25 MILLIGRAM(S): 25 TABLET, FILM COATED ORAL at 05:22

## 2022-09-11 RX ADMIN — Medication 500000 UNIT(S): at 17:06

## 2022-09-11 RX ADMIN — Medication 3 MILLILITER(S): at 12:18

## 2022-09-11 RX ADMIN — Medication 0.5 MILLIGRAM(S): at 18:29

## 2022-09-11 RX ADMIN — INSULIN HUMAN 3 UNIT(S)/HR: 100 INJECTION, SOLUTION SUBCUTANEOUS at 01:11

## 2022-09-11 RX ADMIN — CHLORHEXIDINE GLUCONATE 5 MILLILITER(S): 213 SOLUTION TOPICAL at 17:06

## 2022-09-11 RX ADMIN — CHLORHEXIDINE GLUCONATE 5 MILLILITER(S): 213 SOLUTION TOPICAL at 05:23

## 2022-09-11 RX ADMIN — MEXILETINE HYDROCHLORIDE 200 MILLIGRAM(S): 150 CAPSULE ORAL at 13:03

## 2022-09-11 NOTE — PROGRESS NOTE ADULT - SUBJECTIVE AND OBJECTIVE BOX
CRITICAL CARE ATTENDING - CTICU    MEDICATIONS  (STANDING):  albuterol/ipratropium for Nebulization 3 milliLiter(s) Nebulizer every 6 hours  buDESOnide    Inhalation Suspension 0.5 milliGRAM(s) Inhalation every 12 hours  chlorhexidine 0.12% Liquid 5 milliLiter(s) Oral Mucosa two times a day  chlorhexidine 2% Cloths 1 Application(s) Topical <User Schedule>  collagenase Ointment 1 Application(s) Topical daily  dexMEDEtomidine Infusion 0.4 MICROgram(s)/kG/Hr (6.77 mL/Hr) IV Continuous <Continuous>  esMOLOL  Infusion 50 MICROgram(s)/kG/Min (20.3 mL/Hr) IV Continuous <Continuous>  furosemide    Tablet 40 milliGRAM(s) Oral daily  insulin regular Infusion 3 Unit(s)/Hr (3 mL/Hr) IV Continuous <Continuous>  meropenem  IVPB 1000 milliGRAM(s) IV Intermittent every 8 hours  mexiletine 200 milliGRAM(s) Oral every 8 hours  niCARdipine Infusion 2.5 mG/Hr (12.5 mL/Hr) IV Continuous <Continuous>  nystatin    Suspension 692185 Unit(s) Oral every 6 hours  pantoprazole  Injectable 40 milliGRAM(s) IV Push daily  predniSONE   Tablet 8 milliGRAM(s) Oral daily  sodium chloride 0.9%. 1000 milliLiter(s) (10 mL/Hr) IV Continuous <Continuous>  spironolactone 25 milliGRAM(s) Oral every 12 hours                                    8.7    11.51 )-----------( 144      ( 11 Sep 2022 00:35 )             28.1           141  |  105  |  21  ----------------------------<  169<H>  4.2   |  28  |  0.58    Ca    8.4      11 Sep 2022 00:35  Phos  2.1       Mg     2.0         TPro  5.6<L>  /  Alb  3.0<L>  /  TBili  0.5  /  DBili  x   /  AST  29  /  ALT  75<H>  /  AlkPhos  95  11          Mode: AC/ CMV (Assist Control/ Continuous Mandatory Ventilation)  RR (machine): 16  TV (machine): 450  FiO2: 40  PEEP: 5  ITime: 1  MAP: 11  PIP: 24      Daily     Daily Weight in k.7 (11 Sep 2022 00:00)      09-10 @ 07:01   @ 07:00  --------------------------------------------------------  IN: 4208.4 mL / OUT: 4770 mL / NET: -561.6 mL     @ 07:01  -   @ 09:05  --------------------------------------------------------  IN: 148.5 mL / OUT: 275 mL / NET: -126.5 mL        Critically Ill patient  : [ ] preoperative ,   [x ] post operative    Requires :  [x ] Arterial Line   [x ] Central Line  [ ] PA catheter  [ ] IABP  [ ] ECMO  [ ] LVAD  [x ] Ventilator  [x ] pacemaker - TPM [ ] Impella.                      [x ] ABG's     [ x] Pulse Oxymetry Monitoring  Bedside evaluation , monitoring , treatment of hemodynamics , fluids , IVP/ IVCD meds.        Diagnosis:     POD 5, POD 9/6 - Aortic Dissection Repair     Reintubated     Admitted - chest / back pain     Type A Aortic Dissection     Chest tube drainage / management     Requires chest PT, pulmonary toilet, ambu bagging, suctioning to maintain SaO2,  patent airway and treat atelectasis.     Difficult weaning process - multiple organ system involvement in critically ill patient     Ventilator Management:  [x]AC-rest    [ x]CPAP-PS Wean    [ ]Trach Collar     [ ]Extubate    [ ] T-Piece  [x ]peep>5    Respiratory Failure     Temporary pacemaker (TPM) interrogation and setting.     Sedated on IVCD Precedex - agitation / ventilator synchrony     CHF- acute [ x]   chronic [ ]    systolic [ x]   diastolic [ ]  Valvular [ ]          - Echo- EF - 60            [ x] RV dysfunction - post op          - Cxr-cardiomegally, edema          - Clinical-  [ ]inotropes   [ ]pressors   [x ]diuresis   [ ]IABP   [ ]ECMO   [ ]LVAD   [x ]Respiratory Failure      Hemodynamic lability,  instability. Requires IVCD [ ] vasopressors [x ]  Beta Blocker  [x] vasodilator  [x]IVSS fluid  to maintain MAP, perfusion, C.I.      Hypertension     DM- IVCD Insulin     Fluid Overload    Hypovolemia    Thrombocytopenia    Tolerates NG / NJ feeds at [x] goal rate    [ ] trophic rate    [ ]       rate      Requires bedside physical therapy, mobilization and total [facility] care.           COPD     Colostomy - colon CA     Chronic Renal Failure     secretions     Sputum shows numerous Gram neg rods                By signing my name below, I, Tanner Pastrana, attest that this documentation has been prepared under the direction and in the presence of Bon Jiménez MD.   Electronically Signed: Georgi Solo 22 @ 09:05      Discussed with CT surgeon, Physician Assistant - Nurse Practitioner- Critical care medicine team.   Discussed at  AM / PM rounds.   Chart, labs , films reviewed.    Cumulative Critical Care Time Given Today:  CRITICAL CARE ATTENDING - CTICU    MEDICATIONS  (STANDING):  albuterol/ipratropium for Nebulization 3 milliLiter(s) Nebulizer every 6 hours  buDESOnide    Inhalation Suspension 0.5 milliGRAM(s) Inhalation every 12 hours  chlorhexidine 0.12% Liquid 5 milliLiter(s) Oral Mucosa two times a day  chlorhexidine 2% Cloths 1 Application(s) Topical <User Schedule>  collagenase Ointment 1 Application(s) Topical daily  dexMEDEtomidine Infusion 0.4 MICROgram(s)/kG/Hr (6.77 mL/Hr) IV Continuous <Continuous>  esMOLOL  Infusion 50 MICROgram(s)/kG/Min (20.3 mL/Hr) IV Continuous <Continuous>  furosemide    Tablet 40 milliGRAM(s) Oral daily  insulin regular Infusion 3 Unit(s)/Hr (3 mL/Hr) IV Continuous <Continuous>  meropenem  IVPB 1000 milliGRAM(s) IV Intermittent every 8 hours  mexiletine 200 milliGRAM(s) Oral every 8 hours  niCARdipine Infusion 2.5 mG/Hr (12.5 mL/Hr) IV Continuous <Continuous>  nystatin    Suspension [id number] Unit(s) Oral every 6 hours  pantoprazole  Injectable 40 milliGRAM(s) IV Push daily  predniSONE   Tablet 8 milliGRAM(s) Oral daily  sodium chloride 0.9%. 1000 milliLiter(s) (10 mL/Hr) IV Continuous <Continuous>  spironolactone 25 milliGRAM(s) Oral every 12 hours                                    8.7    11.51 )-----------( 144      ( 11 Sep 2022 00:35 )             28.1           141  |  105  |  21  ----------------------------<  169<H>  4.2   |  28  |  0.58    Ca    8.4      11 Sep 2022 00:35  Phos  2.1       Mg     2.0         TPro  5.6<L>  /  Alb  3.0<L>  /  TBili  0.5  /  DBili  x   /  AST  29  /  ALT  75<H>  /  AlkPhos  95  11          Mode: AC/ CMV (Assist Control/ Continuous Mandatory Ventilation)  RR (machine): 16  TV (machine): 450  FiO2: 40  PEEP: 5  ITime: 1  MAP: 11  PIP: 24      Daily     Daily Weight in k.7 (11 Sep 2022 00:00)      09-10 @ 07: @ 07:00  --------------------------------------------------------  IN: 4208.4 mL / OUT: 4770 mL / NET: -561.6 mL     @ 07:01  -   @ 09:05  --------------------------------------------------------  IN: 148.5 mL / OUT: 275 mL / NET: -126.5 mL        Critically Ill patient  : [ ] preoperative ,   [x ] post operative    Requires :  [x ] Arterial Line   [x ] Central Line  [ ] PA catheter  [ ] IABP  [ ] ECMO  [ ] LVAD  [x ] Ventilator  [x ] pacemaker - TPM [ ] Impella.                      [x ] ABG's     [ x] Pulse Oxymetry Monitoring  Bedside evaluation , monitoring , treatment of hemodynamics , fluids , IVP/ IVCD meds.        Diagnosis:     POD 5,  - Aortic Dissection Repair     Reintubated     Admitted - chest / back pain     Type A Aortic Dissection      Requires chest PT, pulmonary toilet, ambu bagging, suctioning to maintain SaO2,  patent airway and treat atelectasis.     Difficult weaning process - multiple organ system involvement in critically ill patient     Ventilator Management:  [x]AC-rest    [ x]CPAP-PS Wean    [ ]Trach Collar     [ ]Extubate    [ ] T-Piece  [x ]peep>5    Respiratory Failure     Temporary pacemaker (TPM) interrogation and setting.     Sedated on IVCD Precedex - agitation / ventilator synchrony     CHF- acute [ x]   chronic [ ]    systolic [ x]   diastolic [ ]  Valvular [ ]          - Echo- EF - 60            [ x] RV dysfunction - post op          - Cxr-cardiomegally, edema          - Clinical-  [ ]inotropes   [ ]pressors   [x ]diuresis   [ ]IABP   [ ]ECMO   [ ]LVAD   [x ]Respiratory Failure      Hemodynamic lability,  instability. Requires IVCD [ ] vasopressors [x ]  Beta Blocker  [x] vasodilator  [x]IVSS fluid  to maintain MAP, perfusion, C.I.      Hypertension     DM- IVCD Insulin     Fluid Overload    Thrombocytopenia    Tolerates NG / NJ feeds at [x] goal rate    [ ] trophic rate    [ ]       rate      Requires bedside physical therapy, mobilization and total [facility] care.           COPD     Colostomy - colon CA     Chronic Renal Failure     Increased secretions     Sputum shows numerous Gram neg rods                By signing my name below, I, Tanner Pastrana, attest that this documentation has been prepared under the direction and in the presence of Bon Jiménez MD.   Electronically Signed: Georgi Solo 22 @ 09:05    I, Bon Jiménez, personally performed the services described in this documentation. All medical record entries made by the scribe were at my direction and in my presence. I have reviewed the chart and agree that the record reflects my personal performance and is accurate and complete.   Bon Jiménez MD.       Discussed with CT surgeon, Physician Assistant - Nurse Practitioner- Critical care medicine team.   Discussed at  AM / PM rounds.   Chart, labs , films reviewed.    Cumulative Critical Care Time Given Today:  35 min

## 2022-09-11 NOTE — PROGRESS NOTE ADULT - SUBJECTIVE AND OBJECTIVE BOX
RAYRAY RODRIGUEZ  MRN-[id number]  Patient is a 71y old  Female who presents with a chief complaint of Type A aortic dissection (11 Sep 2022 09:05)    HPI:  71F PMH DM, COPD, Chronic Adrenal Insufficiency on Chronic prednisone, history of colorectal cancer s/p resection (colostomy bag), Hx of CAD, Chronic A fib on Eliquis, and tracheomalacia and multiple intubations, recent dx of OM presented to ED at [facility] this morning with epigastric pain, belching and central chest pain. Found on CTA to have type A aortic dissection and transferred to [facility] for surgical evaluation by Dr. Cabrera. (06 Sep 2022 16:32)      Surgery/Hospital course:  - 2022 Modified bentall and hemiarch    -  Extubated  -  Reintubated for change in mental status, unable clear secretions  -  self extubated -> reintubated    Today:  - Weaned off sedation  - Febrile    Physical Exam:  Vital Signs Last 24 Hrs  T(C): 38.4 (11 Sep 2022 20:00), Max: 38.4 (11 Sep 2022 20:00)  T(F): 101.1 (11 Sep 2022 20:00), Max: 101.1 (11 Sep 2022 20:00)  HR: 83 (11 Sep 2022 20:00) (66 - 89)  BP: --  BP(mean): --  RR: 22 (11 Sep 2022 20:00) (14 - 28)  SpO2: 100% (11 Sep 2022 20:00) (99% - 100%)    Parameters below as of 11 Sep 2022 20:00  Patient On (Oxygen Delivery Method): ventilator    O2 Concentration (%): 40  Gen:  Awake, alert   CNS: non focal 	  Neck: no JVD  RES : clear , no wheezing                       CVS: Regular  rhythm. Normal S1/S2  Abd: Soft, non-distended. Bowel sounds present.  Skin: No rash.  Ext:  no edema  ============================I/O===========================   I&O's Detail    10 Sep 2022 07:01  -  11 Sep 2022 07:00  --------------------------------------------------------  IN:    Dexmedetomidine: 609.6 mL    Enteral Tube Flush: 70 mL    Esmolol: 1024.9 mL    Glucerna 1.5: 1440 mL    Insulin: 56 mL    IV PiggyBack: 250 mL    IV PiggyBack: 200 mL    NiCARdipine: 197.5 mL    Propofol: 120.4 mL    sodium chloride 0.9%: 240 mL  Total IN: 4208.4 mL    OUT:    Chest Tube (mL): 20 mL    Indwelling Catheter - Urethral (mL): 4750 mL  Total OUT: 4770 mL    Total NET: -561.6 mL      11 Sep 2022 07:01  -  11 Sep 2022 20:05  --------------------------------------------------------  IN:    Dexmedetomidine: 245.5 mL    Enteral Tube Flush: 105 mL    Esmolol: 527.8 mL    Glucerna 1.5: 780 mL    Insulin: 47.5 mL    IV PiggyBack: 50 mL    NiCARdipine: 227.5 mL    sodium chloride 0.9%: 130 mL  Total IN: 2113.3 mL    OUT:    Indwelling Catheter - Urethral (mL): 1795 mL    Propofol: 0 mL  Total OUT: 1795 mL    Total NET: 318.3 mL    ============================ LABS =========================                        8.7    11.51 )-----------( 144      ( 11 Sep 2022 00:35 )             28.1         141  |  105  |  21  ----------------------------<  169<H>  4.2   |  28  |  0.58    Ca    8.4      11 Sep 2022 00:35  Phos  2.1       Mg     2.0         TPro  5.6<L>  /  Alb  3.0<L>  /  TBili  0.5  /  DBili  x   /  AST  29  /  ALT  75<H>  /  AlkPhos  95      LIVER FUNCTIONS - ( 11 Sep 2022 00:35 )  Alb: 3.0 g/dL / Pro: 5.6 g/dL / ALK PHOS: 95 U/L / ALT: 75 U/L / AST: 29 U/L / GGT: x             ABG - ( 11 Sep 2022 00:00 )  pH, Arterial: 7.49  pH, Blood: x     /  pCO2: 39    /  pO2: 160   / HCO3: 30    / Base Excess: 5.9   /  SaO2: 98.3        Urinalysis Basic - ( 09 Sep 2022 20:27 )    Color: Yellow / Appearance: Clear / S.022 / pH: x  Gluc: x / Ketone: Trace  / Bili: Negative / Urobili: Negative   Blood: x / Protein: 30 mg/dL / Nitrite: Negative   Leuk Esterase: Large / RBC: 4 /hpf / WBC 60 /HPF   Sq Epi: x / Non Sq Epi: 4 /hpf / Bacteria: Negative      ======================Micro/Rad/Cardio=================  Culture: Reviewed   CXR: Reviewed  Echo:Reviewed  ======================================================  PAST MEDICAL & SURGICAL HISTORY:  Atrial fibrillation  paroxysmal, on eliquis    Diabetes Type 2    COPD (chronic obstructive pulmonary disease)    Adrenal insufficiency  Medrol daily for over 50 years    Aortic insufficiency  moderate AR on echo 5/3/2018    Pelvic fracture    Asthma    Tracheobronchomalacia  diagnosed , s/p bronchial thermoplasty  (Dr Zapien); recent bronchoscopy 2018 revealed no evidence of tracheobronchomalacia in trachea or bronchial tubes    Colorectal cancer  2018- last treatment , chemo and radiation    Rectal bleeding    Seizure  x 1 18    DVT (deep venous thrombosis)  15-20 years ago, took coumadin    TIA (transient ischemic attack)  multiple, last 5 years ago - presents as right-sided weakness    History of partial hysterectomy  30 years ago - fibroids    H/O total knee replacement, bilateral  5 years ago    History of sinus surgery  multiple sinus surgeries    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)- course complicated by obstruction, respiratory failure, multiple CPR attempts -  stent discontinued; 10/20/2016 Tracheobronchoplasty (Prolene Mesh) performed at [facility] by Dr Zapien    S/P bronchoscopy  2018 - [place] (Dr Zapien) no evidence of tracheobronchomalacia in trachea or bronchial tubes    Rectal bleeding  exam under anesthesia ([facility]) 2018      ====================ASSESSMENT ==============  71F PMH DM, COPD, Chronic Adrenal Insufficiency on Chronic prednisone, history of colorectal cancer s/p resection (colostomy bag), Hx of CAD, Chronic A fib on Eliquis, and tracheomalacia and multiple intubations, recent dx of OM presented to ED at [facility] this morning with epigastric pain, belching and central chest pain. Found on CTA to have type A aortic dissection and transferred to [facility] for surgical evaluation by Dr. Cabrera.     Ascending aortic dissection s/p modified bentall and hemiarch on    Hemodynamic instability  Cardiogenic shock   Hypovolemic shock   Thrombocytopenia  Encounter for ventilator management   Acute blood loss anemia      Plan:  ====================== NEUROLOGY=====================  Sedated with IV Precedex to maintain vent synchrony- currently weaned off  Tylenol for analgesia     ==================== RESPIRATORY======================  Respiratory support: Mechanical Ventilation   Titration of FiO2 and PEEP, follow SpO2, ABG, CXR, blood gasses   Continue with bronchodilators    Mechanical Ventilation:  Mode: CPAP with PS  FiO2: 40  PEEP: 5  PS: 10  MAP: 9  PIP: 16    ====================CARDIOVASCULAR==================  Continue invasive hemodynamic monitoring   Cardene at 4 mg  Continue preoperative Mexiletine  IV Esmolol for hypertension  Back up VVI paced at 50 via temp epicardial wires     ===================HEMATOLOGIC/ONC ===================  Thrombocytopenia and acute blood loss anemia  Continue to monitor H&H/Plts     ===================== RENAL =========================  Amezcua for monitoring urine output  Continue monitoring I&Os and BUN/Cr, and UOP  Replete lytes PRN. Keep K> 4 and Mg >2  Diuresis with Lasix and Aldactone    ==================== GASTROINTESTINAL===================  Diet: TFs Glucerna 1.5 at goal 60 cc/hr  Protonix    =======================ENDOCRINE  =====================  Diabetes mellitus Type 2   A1C: 9.4%  Glycemic control: Insulin gtt    ========================INFECTIOUS DISEASE================  Temp: 101.1 F  SCx on  revealed Numerous Proteus mirabilis   BCx on 9/10 NGTD  Meropenem for bacteremia        Patient requires continuous monitoring with:  bedside rhythm monitoring, arterial line, pulse oximetry, ventilator management and monitoring; intermittent blood gas analysis.  Care plan discussed with ICU care team.  patient remain critical; required more than usual post op care; I have spent 50 minutes providing non routine post op care, revaluated multiple times during the day .     By signing my name below, I, Maria D'Amico, attest that this documentation has been prepared under the direction and in the presence of Liudmila Morelos MD.  Electronically signed: Maria D'Amico, Scribe, 22 @ 20:05    I, Liudmila Morelos, personally performed the services described in this documentation. all medical record entries made by the rogers were at my direction and in my presence. I have reviewed the chart and agree that the record reflects my personal performance and is accurate and complete  Electronically signed: Liudmila Morelos, 22 @ 20:05       RAYRAY RODRIGUEZ  MRN-[id number]  Patient is a 71y old  Female who presents with a chief complaint of Type A aortic dissection (11 Sep 2022 09:05)    HPI:  71F PMH DM, COPD, Chronic Adrenal Insufficiency on Chronic prednisone, history of colorectal cancer s/p resection (colostomy bag), Hx of CAD, Chronic A fib on Eliquis, and tracheomalacia and multiple intubations, recent dx of OM presented to ED at [facility] this morning with epigastric pain, belching and central chest pain. Found on CTA to have type A aortic dissection and transferred to [facility] for surgical evaluation by Dr. Cabrera. (06 Sep 2022 16:32)      Surgery/Hospital course:  - 2022 Modified bentall and hemiarch    -  Extubated  -  Reintubated for change in mental status, unable clear secretions  -  self extubated -> reintubated                                                                                                                                                     Physical Exam:  Vital Signs Last 24 Hrs  T(C): 38.4 (11 Sep 2022 20:00), Max: 38.4 (11 Sep 2022 20:00)  T(F): 101.1 (11 Sep 2022 20:00), Max: 101.1 (11 Sep 2022 20:00)  HR: 83 (11 Sep 2022 20:00) (66 - 89)  BP: --  BP(mean): --  RR: 22 (11 Sep 2022 20:00) (14 - 28)  SpO2: 100% (11 Sep 2022 20:00) (99% - 100%)    Parameters below as of 11 Sep 2022 20:00  Patient On (Oxygen Delivery Method): ventilator    O2 Concentration (%): 40  Gen:  Awake, alert   CNS: non focal 	  Neck: no JVD  RES : clear , no wheezing                       CVS: Regular  rhythm. Normal S1/S2  Abd: Soft, non-distended. Bowel sounds present.  Skin: No rash.  Ext:  no edema  ============================I/O===========================   I&O's Detail    10 Sep 2022 07:01  -  11 Sep 2022 07:00  --------------------------------------------------------  IN:    Dexmedetomidine: 609.6 mL    Enteral Tube Flush: 70 mL    Esmolol: 1024.9 mL    Glucerna 1.5: 1440 mL    Insulin: 56 mL    IV PiggyBack: 250 mL    IV PiggyBack: 200 mL    NiCARdipine: 197.5 mL    Propofol: 120.4 mL    sodium chloride 0.9%: 240 mL  Total IN: 4208.4 mL    OUT:    Chest Tube (mL): 20 mL    Indwelling Catheter - Urethral (mL): 4750 mL  Total OUT: 4770 mL    Total NET: -561.6 mL      11 Sep 2022 07:01  -  11 Sep 2022 20:05  --------------------------------------------------------  IN:    Dexmedetomidine: 245.5 mL    Enteral Tube Flush: 105 mL    Esmolol: 527.8 mL    Glucerna 1.5: 780 mL    Insulin: 47.5 mL    IV PiggyBack: 50 mL    NiCARdipine: 227.5 mL    sodium chloride 0.9%: 130 mL  Total IN: 2113.3 mL    OUT:    Indwelling Catheter - Urethral (mL): 1795 mL    Propofol: 0 mL  Total OUT: 1795 mL    Total NET: 318.3 mL    ============================ LABS =========================                        8.7    11.51 )-----------( 144      ( 11 Sep 2022 00:35 )             28.1     -    141  |  105  |  21  ----------------------------<  169<H>  4.2   |  28  |  0.58    Ca    8.4      11 Sep 2022 00:35  Phos  2.1       Mg     2.0         TPro  5.6<L>  /  Alb  3.0<L>  /  TBili  0.5  /  DBili  x   /  AST  29  /  ALT  75<H>  /  AlkPhos  95  09-11    LIVER FUNCTIONS - ( 11 Sep 2022 00:35 )  Alb: 3.0 g/dL / Pro: 5.6 g/dL / ALK PHOS: 95 U/L / ALT: 75 U/L / AST: 29 U/L / GGT: x             ABG - ( 11 Sep 2022 00:00 )  pH, Arterial: 7.49  pH, Blood: x     /  pCO2: 39    /  pO2: 160   / HCO3: 30    / Base Excess: 5.9   /  SaO2: 98.3        Urinalysis Basic - ( 09 Sep 2022 20:27 )    Color: Yellow / Appearance: Clear / S.022 / pH: x  Gluc: x / Ketone: Trace  / Bili: Negative / Urobili: Negative   Blood: x / Protein: 30 mg/dL / Nitrite: Negative   Leuk Esterase: Large / RBC: 4 /hpf / WBC 60 /HPF   Sq Epi: x / Non Sq Epi: 4 /hpf / Bacteria: Negative      ======================Micro/Rad/Cardio=================  Culture: Reviewed   CXR: Reviewed  Echo:Reviewed  ======================================================  PAST MEDICAL & SURGICAL HISTORY:  Atrial fibrillation  paroxysmal, on eliquis    Diabetes Type 2    COPD (chronic obstructive pulmonary disease)    Adrenal insufficiency  Medrol daily for over 50 years    Aortic insufficiency  moderate AR on echo 5/3/2018    Pelvic fracture    Asthma    Tracheobronchomalacia  diagnosed , s/p bronchial thermoplasty  (Dr Zapien); recent bronchoscopy 2018 revealed no evidence of tracheobronchomalacia in trachea or bronchial tubes    Colorectal cancer  2018- last treatment , chemo and radiation    Rectal bleeding    Seizure  x 1 18    DVT (deep venous thrombosis)  15-20 years ago, took coumadin    TIA (transient ischemic attack)  multiple, last 5 years ago - presents as right-sided weakness    History of partial hysterectomy  30 years ago - fibroids    H/O total knee replacement, bilateral  5 years ago    History of sinus surgery  multiple sinus surgeries    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    H/O pelvic surgery  5 years ago - s/p fracture    History of tracheomalacia   - attempted tracheal stenting ([facility])- course complicated by obstruction, respiratory failure, multiple CPR attempts -  stent discontinued; 10/20/2016 Tracheobronchoplasty (Prolene Mesh) performed at [facility] by Dr Zapien    S/P bronchoscopy  2018 - [place] (Dr Zapien) no evidence of tracheobronchomalacia in trachea or bronchial tubes    Rectal bleeding  exam under anesthesia ([facility]) 2018      ====================ASSESSMENT ==============  71F PMH DM, COPD, Chronic Adrenal Insufficiency on Chronic prednisone, history of colorectal cancer s/p resection (colostomy bag), Hx of CAD, Chronic A fib on Eliquis, and tracheomalacia and multiple intubations, recent dx of OM presented to ED at [facility] this morning with epigastric pain, belching and central chest pain. Found on CTA to have type A aortic dissection and transferred to [facility] for surgical evaluation by Dr. Cabrera.     Ascending aortic dissection s/p modified bentall and hemiarch on    Hemodynamic instability  Cardiogenic shock   Hypovolemic shock   Thrombocytopenia  Encounter for ventilator management   Acute blood loss anemia      Plan:  ====================== NEUROLOGY=====================  Sedated with IV Precedex to maintain vent synchrony- currently weaned off  Tylenol for analgesia     ==================== RESPIRATORY======================  Respiratory support: Mechanical Ventilation   Titration of FiO2 and PEEP, follow SpO2, ABG, CXR, blood gasses   Continue with bronchodilators    Mechanical Ventilation:  Mode: CPAP with PS  FiO2: 40  PEEP: 5  PS: 10  MAP: 9  PIP: 16    ====================CARDIOVASCULAR==================  Continue invasive hemodynamic monitoring   Cardene at 4 mg  Continue preoperative Mexiletine  IV Esmolol for hypertension  Back up VVI paced at 50 via temp epicardial wires     ===================HEMATOLOGIC/ONC ===================  Thrombocytopenia and acute blood loss anemia  Continue to monitor H&H/Plts     ===================== RENAL =========================  Amezcua for monitoring urine output  Continue monitoring I&Os and BUN/Cr, and UOP  Replete lytes PRN. Keep K> 4 and Mg >2  Diuresis with Lasix and Aldactone    ==================== GASTROINTESTINAL===================  Diet: TFs Glucerna 1.5 at goal 60 cc/hr  Protonix    =======================ENDOCRINE  =====================  Diabetes mellitus Type 2   A1C: 9.4%  Glycemic control: Insulin gtt    ========================INFECTIOUS DISEASE================  Temp: 101.1 F  SCx on  revealed Numerous Proteus mirabilis   BCx on 9/10 NGTD  Meropenem for bacteremia        Patient requires continuous monitoring with:  bedside rhythm monitoring, arterial line, pulse oximetry, ventilator management and monitoring; intermittent blood gas analysis.  Care plan discussed with ICU care team.  patient remain critical; required more than usual post op care; I have spent 50 minutes providing non routine post op care, revaluated multiple times during the day .     By signing my name below, I, Maria D'Amico, attest that this documentation has been prepared under the direction and in the presence of Liudmila Morelos MD.  Electronically signed: Maria D'Amico, Scribe, 22 @ 20:05    I, Liudmila Morelos, personally performed the services described in this documentation. all medical record entries made by the rogers were at my direction and in my presence. I have reviewed the chart and agree that the record reflects my personal performance and is accurate and complete  Electronically signed: Liudmila Morelos, 22 @ 20:05

## 2022-09-12 LAB
ALBUMIN SERPL ELPH-MCNC: 3 G/DL — LOW (ref 3.3–5)
ALBUMIN SERPL ELPH-MCNC: 3.4 G/DL — SIGNIFICANT CHANGE UP (ref 3.3–5)
ALP SERPL-CCNC: 101 U/L — SIGNIFICANT CHANGE UP (ref 40–120)
ALP SERPL-CCNC: 90 U/L — SIGNIFICANT CHANGE UP (ref 40–120)
ALT FLD-CCNC: 67 U/L — HIGH (ref 10–45)
ALT FLD-CCNC: 71 U/L — HIGH (ref 10–45)
AMYLASE P1 CFR SERPL: 82 U/L — SIGNIFICANT CHANGE UP (ref 25–125)
ANION GAP SERPL CALC-SCNC: 8 MMOL/L — SIGNIFICANT CHANGE UP (ref 5–17)
AST SERPL-CCNC: 32 U/L — SIGNIFICANT CHANGE UP (ref 10–40)
AST SERPL-CCNC: 37 U/L — SIGNIFICANT CHANGE UP (ref 10–40)
BILIRUB DIRECT SERPL-MCNC: 0.1 MG/DL — SIGNIFICANT CHANGE UP (ref 0–0.3)
BILIRUB INDIRECT FLD-MCNC: 0.3 MG/DL — SIGNIFICANT CHANGE UP (ref 0.2–1)
BILIRUB SERPL-MCNC: 0.4 MG/DL — SIGNIFICANT CHANGE UP (ref 0.2–1.2)
BILIRUB SERPL-MCNC: 0.4 MG/DL — SIGNIFICANT CHANGE UP (ref 0.2–1.2)
BUN SERPL-MCNC: 17 MG/DL — SIGNIFICANT CHANGE UP (ref 7–23)
CALCIUM SERPL-MCNC: 8.4 MG/DL — SIGNIFICANT CHANGE UP (ref 8.4–10.5)
CHLORIDE SERPL-SCNC: 102 MMOL/L — SIGNIFICANT CHANGE UP (ref 96–108)
CO2 SERPL-SCNC: 27 MMOL/L — SIGNIFICANT CHANGE UP (ref 22–31)
CREAT SERPL-MCNC: 0.53 MG/DL — SIGNIFICANT CHANGE UP (ref 0.5–1.3)
EGFR: 98 ML/MIN/1.73M2 — SIGNIFICANT CHANGE UP
GAS PNL BLDA: SIGNIFICANT CHANGE UP
GLUCOSE BLDC GLUCOMTR-MCNC: 103 MG/DL — HIGH (ref 70–99)
GLUCOSE BLDC GLUCOMTR-MCNC: 106 MG/DL — HIGH (ref 70–99)
GLUCOSE BLDC GLUCOMTR-MCNC: 120 MG/DL — HIGH (ref 70–99)
GLUCOSE BLDC GLUCOMTR-MCNC: 129 MG/DL — HIGH (ref 70–99)
GLUCOSE BLDC GLUCOMTR-MCNC: 130 MG/DL — HIGH (ref 70–99)
GLUCOSE BLDC GLUCOMTR-MCNC: 159 MG/DL — HIGH (ref 70–99)
GLUCOSE BLDC GLUCOMTR-MCNC: 161 MG/DL — HIGH (ref 70–99)
GLUCOSE BLDC GLUCOMTR-MCNC: 166 MG/DL — HIGH (ref 70–99)
GLUCOSE BLDC GLUCOMTR-MCNC: 166 MG/DL — HIGH (ref 70–99)
GLUCOSE BLDC GLUCOMTR-MCNC: 170 MG/DL — HIGH (ref 70–99)
GLUCOSE BLDC GLUCOMTR-MCNC: 191 MG/DL — HIGH (ref 70–99)
GLUCOSE BLDC GLUCOMTR-MCNC: 195 MG/DL — HIGH (ref 70–99)
GLUCOSE BLDC GLUCOMTR-MCNC: 195 MG/DL — HIGH (ref 70–99)
GLUCOSE BLDC GLUCOMTR-MCNC: 208 MG/DL — HIGH (ref 70–99)
GLUCOSE BLDC GLUCOMTR-MCNC: 209 MG/DL — HIGH (ref 70–99)
GLUCOSE BLDC GLUCOMTR-MCNC: 212 MG/DL — HIGH (ref 70–99)
GLUCOSE BLDC GLUCOMTR-MCNC: 220 MG/DL — HIGH (ref 70–99)
GLUCOSE BLDC GLUCOMTR-MCNC: 225 MG/DL — HIGH (ref 70–99)
GLUCOSE BLDC GLUCOMTR-MCNC: 234 MG/DL — HIGH (ref 70–99)
GLUCOSE BLDC GLUCOMTR-MCNC: 87 MG/DL — SIGNIFICANT CHANGE UP (ref 70–99)
GLUCOSE BLDC GLUCOMTR-MCNC: 93 MG/DL — SIGNIFICANT CHANGE UP (ref 70–99)
GLUCOSE SERPL-MCNC: 137 MG/DL — HIGH (ref 70–99)
HCT VFR BLD CALC: 30.3 % — LOW (ref 34.5–45)
HGB BLD-MCNC: 9.1 G/DL — LOW (ref 11.5–15.5)
LIDOCAIN IGE QN: 29 U/L — SIGNIFICANT CHANGE UP (ref 7–60)
MAGNESIUM SERPL-MCNC: 2 MG/DL — SIGNIFICANT CHANGE UP (ref 1.6–2.6)
MCHC RBC-ENTMCNC: 23.2 PG — LOW (ref 27–34)
MCHC RBC-ENTMCNC: 30 GM/DL — LOW (ref 32–36)
MCV RBC AUTO: 77.3 FL — LOW (ref 80–100)
NRBC # BLD: 1 /100 WBCS — HIGH (ref 0–0)
PHOSPHATE SERPL-MCNC: 2.3 MG/DL — LOW (ref 2.5–4.5)
PLATELET # BLD AUTO: 176 K/UL — SIGNIFICANT CHANGE UP (ref 150–400)
POTASSIUM SERPL-MCNC: 4.3 MMOL/L — SIGNIFICANT CHANGE UP (ref 3.5–5.3)
POTASSIUM SERPL-SCNC: 4.3 MMOL/L — SIGNIFICANT CHANGE UP (ref 3.5–5.3)
PROT SERPL-MCNC: 5.7 G/DL — LOW (ref 6–8.3)
PROT SERPL-MCNC: 6.2 G/DL — SIGNIFICANT CHANGE UP (ref 6–8.3)
RBC # BLD: 3.92 M/UL — SIGNIFICANT CHANGE UP (ref 3.8–5.2)
RBC # FLD: SIGNIFICANT CHANGE UP (ref 10.3–14.5)
SODIUM SERPL-SCNC: 137 MMOL/L — SIGNIFICANT CHANGE UP (ref 135–145)
SURGICAL PATHOLOGY STUDY: SIGNIFICANT CHANGE UP
WBC # BLD: 13.5 K/UL — HIGH (ref 3.8–10.5)
WBC # FLD AUTO: 13.5 K/UL — HIGH (ref 3.8–10.5)

## 2022-09-12 PROCEDURE — 74177 CT ABD & PELVIS W/CONTRAST: CPT | Mod: 26

## 2022-09-12 PROCEDURE — 99291 CRITICAL CARE FIRST HOUR: CPT

## 2022-09-12 PROCEDURE — 93971 EXTREMITY STUDY: CPT | Mod: 26,RT

## 2022-09-12 PROCEDURE — 71260 CT THORAX DX C+: CPT | Mod: 26

## 2022-09-12 PROCEDURE — 71045 X-RAY EXAM CHEST 1 VIEW: CPT | Mod: 26,76

## 2022-09-12 PROCEDURE — 99233 SBSQ HOSP IP/OBS HIGH 50: CPT

## 2022-09-12 RX ORDER — DEXAMETHASONE 0.5 MG/5ML
10 ELIXIR ORAL EVERY 8 HOURS
Refills: 0 | Status: COMPLETED | OUTPATIENT
Start: 2022-09-12 | End: 2022-09-12

## 2022-09-12 RX ORDER — DEXAMETHASONE 0.5 MG/5ML
10 ELIXIR ORAL EVERY 8 HOURS
Refills: 0 | Status: DISCONTINUED | OUTPATIENT
Start: 2022-09-12 | End: 2022-09-12

## 2022-09-12 RX ORDER — METOPROLOL TARTRATE 50 MG
25 TABLET ORAL
Refills: 0 | Status: DISCONTINUED | OUTPATIENT
Start: 2022-09-12 | End: 2022-09-15

## 2022-09-12 RX ORDER — METOPROLOL TARTRATE 50 MG
25 TABLET ORAL ONCE
Refills: 0 | Status: COMPLETED | OUTPATIENT
Start: 2022-09-12 | End: 2022-09-12

## 2022-09-12 RX ORDER — VANCOMYCIN HCL 1 G
1000 VIAL (EA) INTRAVENOUS EVERY 12 HOURS
Refills: 0 | Status: DISCONTINUED | OUTPATIENT
Start: 2022-09-12 | End: 2022-09-12

## 2022-09-12 RX ORDER — NICARDIPINE HYDROCHLORIDE 30 MG/1
5 CAPSULE, EXTENDED RELEASE ORAL
Qty: 40 | Refills: 0 | Status: DISCONTINUED | OUTPATIENT
Start: 2022-09-12 | End: 2022-09-13

## 2022-09-12 RX ADMIN — Medication 3 MILLILITER(S): at 23:04

## 2022-09-12 RX ADMIN — MEXILETINE HYDROCHLORIDE 200 MILLIGRAM(S): 150 CAPSULE ORAL at 05:55

## 2022-09-12 RX ADMIN — MEROPENEM 100 MILLIGRAM(S): 1 INJECTION INTRAVENOUS at 13:11

## 2022-09-12 RX ADMIN — Medication 3 MILLILITER(S): at 13:20

## 2022-09-12 RX ADMIN — Medication 10 MILLIGRAM(S): at 10:15

## 2022-09-12 RX ADMIN — Medication 500000 UNIT(S): at 17:21

## 2022-09-12 RX ADMIN — Medication 0.5 MILLIGRAM(S): at 05:36

## 2022-09-12 RX ADMIN — Medication 0.5 MILLIGRAM(S): at 17:56

## 2022-09-12 RX ADMIN — Medication 500000 UNIT(S): at 05:55

## 2022-09-12 RX ADMIN — Medication 25 MILLIGRAM(S): at 17:21

## 2022-09-12 RX ADMIN — MEXILETINE HYDROCHLORIDE 200 MILLIGRAM(S): 150 CAPSULE ORAL at 21:47

## 2022-09-12 RX ADMIN — Medication 3 MILLILITER(S): at 17:49

## 2022-09-12 RX ADMIN — Medication 40 MILLIGRAM(S): at 05:55

## 2022-09-12 RX ADMIN — Medication 8 MILLIGRAM(S): at 05:55

## 2022-09-12 RX ADMIN — PANTOPRAZOLE SODIUM 40 MILLIGRAM(S): 20 TABLET, DELAYED RELEASE ORAL at 11:57

## 2022-09-12 RX ADMIN — MEROPENEM 100 MILLIGRAM(S): 1 INJECTION INTRAVENOUS at 05:52

## 2022-09-12 RX ADMIN — Medication 500000 UNIT(S): at 11:57

## 2022-09-12 RX ADMIN — Medication 1 APPLICATION(S): at 11:09

## 2022-09-12 RX ADMIN — Medication 25 MILLIGRAM(S): at 13:12

## 2022-09-12 RX ADMIN — Medication 3 MILLILITER(S): at 05:36

## 2022-09-12 RX ADMIN — Medication 102 MILLIGRAM(S): at 22:35

## 2022-09-12 RX ADMIN — Medication 250 MILLIGRAM(S): at 05:56

## 2022-09-12 RX ADMIN — Medication 500000 UNIT(S): at 00:00

## 2022-09-12 RX ADMIN — CHLORHEXIDINE GLUCONATE 5 MILLILITER(S): 213 SOLUTION TOPICAL at 05:54

## 2022-09-12 RX ADMIN — Medication 63.75 MILLIMOLE(S): at 04:30

## 2022-09-12 RX ADMIN — SPIRONOLACTONE 25 MILLIGRAM(S): 25 TABLET, FILM COATED ORAL at 05:55

## 2022-09-12 RX ADMIN — Medication 25 MILLIGRAM(S): at 12:02

## 2022-09-12 RX ADMIN — SPIRONOLACTONE 25 MILLIGRAM(S): 25 TABLET, FILM COATED ORAL at 17:21

## 2022-09-12 RX ADMIN — CHLORHEXIDINE GLUCONATE 5 MILLILITER(S): 213 SOLUTION TOPICAL at 17:38

## 2022-09-12 RX ADMIN — MEXILETINE HYDROCHLORIDE 200 MILLIGRAM(S): 150 CAPSULE ORAL at 13:12

## 2022-09-12 RX ADMIN — Medication 102 MILLIGRAM(S): at 14:33

## 2022-09-12 RX ADMIN — MEROPENEM 100 MILLIGRAM(S): 1 INJECTION INTRAVENOUS at 21:46

## 2022-09-12 RX ADMIN — CHLORHEXIDINE GLUCONATE 1 APPLICATION(S): 213 SOLUTION TOPICAL at 06:00

## 2022-09-12 NOTE — PROGRESS NOTE ADULT - ASSESSMENT
71F s/p right foot navicular bone biopsy, wound debridement and heel I&D with medial navicular wound   - pt seen and evaluated  - Afebrile, WBC 11.51  - Right foot with two navicular wounds with fibrous wound bed, no clinical signs of infection. L foot with no clinical signs of infection.   - No clinical signs of infection to b/l feet   - Continue Z flows at all times   - Continue santyl for R foot wound followed by DSD  - Completed Vanco and Ertapenem via mediport   - Pt is stable from podiatry for d/c, f/u and wound care instructions listed in discharge note provider  - Seen w/ attending

## 2022-09-12 NOTE — PROGRESS NOTE ADULT - SUBJECTIVE AND OBJECTIVE BOX
Follow Up:  fever    Interval History: pt still intubated and in CTU, persistently febrile    ROS:    Unobtainable because: intubated          Allergies  aspirin (Short breath)  Avelox (Short breath; Pruritus)  cefepime (Anaphylaxis)  codeine (Short breath)  Dilaudid (Short breath)  iodine (Short breath; Swelling)  penicillin (Anaphylaxis)  shellfish (Anaphylaxis)  tetanus toxoid (Short breath)  Valium (Short breath)        ANTIMICROBIALS:  meropenem  IVPB 1000 every 8 hours  nystatin    Suspension [id number] every 6 hours      OTHER MEDS:  acetaminophen    Suspension .. 650 milliGRAM(s) Oral every 6 hours PRN  albuterol/ipratropium for Nebulization 3 milliLiter(s) Nebulizer every 6 hours  buDESOnide    Inhalation Suspension 0.5 milliGRAM(s) Inhalation every 12 hours  chlorhexidine 0.12% Liquid 5 milliLiter(s) Oral Mucosa two times a day  chlorhexidine 2% Cloths 1 Application(s) Topical <User Schedule>  collagenase Ointment 1 Application(s) Topical daily  dexAMETHasone  IVPB 10 milliGRAM(s) IV Intermittent every 8 hours  dexMEDEtomidine Infusion 0.4 MICROgram(s)/kG/Hr IV Continuous <Continuous>  furosemide    Tablet 40 milliGRAM(s) Oral daily  insulin regular Infusion 3 Unit(s)/Hr IV Continuous <Continuous>  metoprolol tartrate 25 milliGRAM(s) Oral two times a day  mexiletine 200 milliGRAM(s) Oral every 8 hours  niCARdipine Infusion 5 mG/Hr IV Continuous <Continuous>  pantoprazole  Injectable 40 milliGRAM(s) IV Push daily  predniSONE   Tablet 8 milliGRAM(s) Oral daily  sodium chloride 0.9%. 1000 milliLiter(s) IV Continuous <Continuous>  spironolactone 25 milliGRAM(s) Oral every 12 hours      Vital Signs Last 24 Hrs  T(C): 38.2 (12 Sep 2022 12:00), Max: 38.4 (11 Sep 2022 20:00)  T(F): 100.8 (12 Sep 2022 12:00), Max: 101.1 (11 Sep 2022 20:00)  HR: 87 (12 Sep 2022 14:00) (73 - 112)  BP: --  BP(mean): --  RR: 19 (12 Sep 2022 14:00) (8 - 31)  SpO2: 98% (12 Sep 2022 14:00) (98% - 100%)    Parameters below as of 12 Sep 2022 13:21  Patient On (Oxygen Delivery Method): ventilator        Physical Exam:  General:    intubated but awake and answered questions with nodding, non toxic  Respiratory:   ET on vent, clear b/l,    no wheezing  abd:     soft,   BS +,   no tenderness  :   no CVAT,  no suprapubic tenderness,   no  ramirez  Musculoskeletal:   no joint swelling  vascular: RIJ CVC, R chest port with no tenderness or erythema  Skin:    no rash                        9.1    13.50 )-----------( 176      ( 12 Sep 2022 00:49 )             30.3       09-12    137  |  102  |  17  ----------------------------<  137<H>  4.3   |  27  |  0.53    Ca    8.4      12 Sep 2022 00:49  Phos  2.3     09-12  Mg     2.0     09-12    TPro  5.7<L>  /  Alb  3.0<L>  /  TBili  0.4  /  DBili  x   /  AST  32  /  ALT  67<H>  /  AlkPhos  90  09-12          MICROBIOLOGY:  v  .Blood Blood  09-10-22   No growth to date.  --  --      Catheterized Catheterized  09-10-22   <10,000 CFU/mL Normal Urogenital Jessica  --  --      .Sputum Sputum  09-08-22   Numerous Proteus mirabilis  Unable to evaluate further due to Proteus overgrowth  --  Proteus mirabilis                RADIOLOGY:  Images independently visualized and reviewed personally, findings as below  < from: Xray Chest 1 View- PORTABLE-Routine (Xray Chest 1 View- PORTABLE-Routine in AM.) (09.11.22 @ 04:48) >  S/P sternotomy; heart valve ring. The trachea is midline. The   endotracheal tube is seen approximately 4.0cm above the balwinder. Unchanged   position of right IJ central venous catheter and right Mediport. Enteric   Dobbhoff catheter is seen coursing below the diaphragm and entering the   stomach. The tip is at the midline, unclear whether it is postpyloric.  The heart size is normal.  There is improvement in right-sided pleural effusion. There is a new   appearing left-sided pleural effusion there is trace sided basilar   atelectasis.  There is no pneumothorax    IMPRESSION:  Lines and tubes as above.  Right-sided basilar atelectasis with improvement in right-sidedpleural   effusion.    < end of copied text >  < from: Intra-Operative Transesophageal Echo (09.06.22 @ 22:54) >  Conclusions:  1. Normal mitral valve. Mild mitral regurgitation.  2. Calcified trileaflet aortic valve with decreased  opening. Moderate aortic regurgitation.  3. Aortic Annulus: 1.9 cm.  Aortic Root: 3.9 cm.  Sinotubular Junction: 4 cm.  LVOT diameter: 1.9 cm.  A dissection flap was noted in the proximal ascending aorta  extending into arch and decscending thoracic and abdominal  aorta. A clear end of dissection was by SAFIA in transgastric  views.  SAFIA guidaince for aortic cannulation of true lumen  provided.  4. Moderate concentric left ventricular hypertrophy.  5. Normal left ventricular systolic function. No segmental  wall motion abnormalities.  6. Normal right atrium.  7. Normal right ventricular size and function.  8. Normal tricuspid valve. Mild tricuspid regurgitation.  9. Moderate pericardial effusion with no tamponade noted  pre-bypass    < end of copied text >

## 2022-09-12 NOTE — PROGRESS NOTE ADULT - SUBJECTIVE AND OBJECTIVE BOX
CRITICAL CARE ATTENDING - CTICU    MEDICATIONS  (STANDING):  albuterol/ipratropium for Nebulization 3 milliLiter(s) Nebulizer every 6 hours  buDESOnide    Inhalation Suspension 0.5 milliGRAM(s) Inhalation every 12 hours  chlorhexidine 0.12% Liquid 5 milliLiter(s) Oral Mucosa two times a day  chlorhexidine 2% Cloths 1 Application(s) Topical <User Schedule>  collagenase Ointment 1 Application(s) Topical daily  dexMEDEtomidine Infusion 0.4 MICROgram(s)/kG/Hr (6.77 mL/Hr) IV Continuous <Continuous>  esMOLOL  Infusion 50 MICROgram(s)/kG/Min (20.3 mL/Hr) IV Continuous <Continuous>  furosemide    Tablet 40 milliGRAM(s) Oral daily  insulin regular Infusion 3 Unit(s)/Hr (3 mL/Hr) IV Continuous <Continuous>  meropenem  IVPB 1000 milliGRAM(s) IV Intermittent every 8 hours  mexiletine 200 milliGRAM(s) Oral every 8 hours  niCARdipine Infusion 2.5 mG/Hr (12.5 mL/Hr) IV Continuous <Continuous>  nystatin    Suspension 645549 Unit(s) Oral every 6 hours  pantoprazole  Injectable 40 milliGRAM(s) IV Push daily  predniSONE   Tablet 8 milliGRAM(s) Oral daily  sodium chloride 0.9%. 1000 milliLiter(s) (10 mL/Hr) IV Continuous <Continuous>  spironolactone 25 milliGRAM(s) Oral every 12 hours  vancomycin  IVPB 1000 milliGRAM(s) IV Intermittent every 12 hours                                    9.1    13.50 )-----------( 176      ( 12 Sep 2022 00:49 )             30.3       09-12    137  |  102  |  17  ----------------------------<  137<H>  4.3   |  27  |  0.53    Ca    8.4      12 Sep 2022 00:49  Phos  2.3     09-12  Mg     2.0     09-12    TPro  5.7<L>  /  Alb  3.0<L>  /  TBili  0.4  /  DBili  x   /  AST  32  /  ALT  67<H>  /  AlkPhos  90  09-12          Mode: AC/ CMV (Assist Control/ Continuous Mandatory Ventilation)  RR (machine): 16  TV (machine): 450  FiO2: 40  PEEP: 5  ITime: 1  MAP: 14  PIP: 22      Daily     Daily       09-11 @ 07:01  -  09-12 @ 07:00  --------------------------------------------------------  IN: 3482 mL / OUT: 2920 mL / NET: 562 mL        Critically Ill patient  : [ ] preoperative ,   [x ] post operative    Requires :  [x ] Arterial Line   [x ] Central Line  [ ] PA catheter  [ ] IABP  [ ] ECMO  [ ] LVAD  [x ] Ventilator  [x ] pacemaker - TPM [ ] Impella.                      [x ] ABG's     [ x] Pulse Oxymetry Monitoring  Bedside evaluation , monitoring , treatment of hemodynamics , fluids , IVP/ IVCD meds.        Diagnosis:     POD 6 - Modified bentall and hemiarch     Admitted - chest / back pain     Type A Aortic Dissection      Requires chest PT, pulmonary toilet, ambu bagging, suctioning to maintain SaO2,  patent airway and treat atelectasis.     Difficult weaning process - multiple organ system involvement in critically ill patient     Ventilator Management:  [x]AC-rest    [ x]CPAP-PS Wean    [ ]Trach Collar     [ ]Extubate    [ ] T-Piece  [x ]peep>5    Respiratory Failure     Temporary pacemaker (TPM) interrogation and setting.     Sedated on IVCD Precedex - agitation / ventilator synchrony     CHF- acute [ x]   chronic [ ]    systolic [ x]   diastolic [ ]  Valvular [ ]          - Echo- EF - 60            [ x] RV dysfunction - post op          - Cxr-cardiomegally, edema          - Clinical-  [ ]inotropes   [ ]pressors   [x ]diuresis   [ ]IABP   [ ]ECMO   [ ]LVAD   [x ]Respiratory Failure    Hemodynamic lability,  instability. Requires IVCD [ ] vasopressors [x ]  Beta Blocker  [x] vasodilator  [x]IVSS fluid  to maintain MAP, perfusion, C.I.      Hypertension     DM- IVCD Insulin     Fluid Overload    Tolerates NG / NJ feeds at [x] goal rate    [ ] trophic rate    [ ]       rate      Requires bedside physical therapy, mobilization and total alf care           COPD     Colostomy - colon CA     Chronic Renal Failure     Increased secretions  / Sputum + Proteus mirabilis         By signing my name below, I, Emelia Felipe, attest that this documentation has been prepared under the direction and in the presence of Bon Jiménez MD.   Electronically Signed: Georgi Burleson 09-12-22 @ 07:39      Discussed with CT surgeon, Physician Assistant - Nurse Practitioner- Critical care medicine team.   Discussed at  AM / PM rounds.   Chart, labs , films reviewed.    Cumulative Critical Care Time Given Today:  CRITICAL CARE ATTENDING - CTICU    MEDICATIONS  (STANDING):  albuterol/ipratropium for Nebulization 3 milliLiter(s) Nebulizer every 6 hours  buDESOnide    Inhalation Suspension 0.5 milliGRAM(s) Inhalation every 12 hours  chlorhexidine 0.12% Liquid 5 milliLiter(s) Oral Mucosa two times a day  chlorhexidine 2% Cloths 1 Application(s) Topical <User Schedule>  collagenase Ointment 1 Application(s) Topical daily  dexMEDEtomidine Infusion 0.4 MICROgram(s)/kG/Hr (6.77 mL/Hr) IV Continuous <Continuous>  esMOLOL  Infusion 50 MICROgram(s)/kG/Min (20.3 mL/Hr) IV Continuous <Continuous>  furosemide    Tablet 40 milliGRAM(s) Oral daily  insulin regular Infusion 3 Unit(s)/Hr (3 mL/Hr) IV Continuous <Continuous>  meropenem  IVPB 1000 milliGRAM(s) IV Intermittent every 8 hours  mexiletine 200 milliGRAM(s) Oral every 8 hours  niCARdipine Infusion 2.5 mG/Hr (12.5 mL/Hr) IV Continuous <Continuous>  nystatin    Suspension 469321 Unit(s) Oral every 6 hours  pantoprazole  Injectable 40 milliGRAM(s) IV Push daily  predniSONE   Tablet 8 milliGRAM(s) Oral daily  sodium chloride 0.9%. 1000 milliLiter(s) (10 mL/Hr) IV Continuous <Continuous>  spironolactone 25 milliGRAM(s) Oral every 12 hours  vancomycin  IVPB 1000 milliGRAM(s) IV Intermittent every 12 hours                                    9.1    13.50 )-----------( 176      ( 12 Sep 2022 00:49 )             30.3       09-12    137  |  102  |  17  ----------------------------<  137<H>  4.3   |  27  |  0.53    Ca    8.4      12 Sep 2022 00:49  Phos  2.3     09-12  Mg     2.0     09-12    TPro  5.7<L>  /  Alb  3.0<L>  /  TBili  0.4  /  DBili  x   /  AST  32  /  ALT  67<H>  /  AlkPhos  90  09-12          Mode: AC/ CMV (Assist Control/ Continuous Mandatory Ventilation)  RR (machine): 16  TV (machine): 450  FiO2: 40  PEEP: 5  ITime: 1  MAP: 14  PIP: 22      Daily     Daily       09-11 @ 07:01  -  09-12 @ 07:00  --------------------------------------------------------  IN: 3482 mL / OUT: 2920 mL / NET: 562 mL        Critically Ill patient  : [ ] preoperative ,   [x ] post operative    Requires :  [x ] Arterial Line   [x ] Central Line  [ ] PA catheter  [ ] IABP  [ ] ECMO  [ ] LVAD  [x ] Ventilator  [x ] pacemaker - TPM [ ] Impella.                      [x ] ABG's     [ x] Pulse Oxymetry Monitoring  Bedside evaluation , monitoring , treatment of hemodynamics , fluids , IVP/ IVCD meds.        Diagnosis:     POD 6 - Modified bentall and hemiarch     Admitted - chest / back pain     Type A Aortic Dissection      Requires chest PT, pulmonary toilet, ambu bagging, suctioning to maintain SaO2,  patent airway and treat atelectasis.     Difficult weaning process - multiple organ system involvement in critically ill patient     Ventilator Management:  [x]AC-rest    [ x]CPAP-PS Wean    [ ]Trach Collar     [ ]Extubate    [ ] T-Piece  [x ]peep>5    Respiratory Failure     Critical Airway - no cuff leak     Temporary pacemaker (TPM) interrogation and setting.     Sedated on IVCD Precedex - agitation / ventilator synchrony     CHF- acute [ x]   chronic [ ]    systolic [ x]   diastolic [ ]  Valvular [ ]          - Echo- EF - 60            [ x] RV dysfunction - post op          - Cxr-cardiomegally, edema          - Clinical-  [ ]inotropes   [ ]pressors   [x ]diuresis   [ ]IABP   [ ]ECMO   [ ]LVAD   [x ]Respiratory Failure    Hemodynamic lability,  instability. Requires IVCD [ ] vasopressors [x ]  Beta Blocker  [x] vasodilator  [x]IVSS fluid  to maintain MAP, perfusion, C.I.      Hypertension     DM- IVCD Insulin     Fluid Overload    Tolerates NG / NJ feeds at [x] goal rate    [ ] trophic rate    [ ]       rate      Requires bedside physical therapy, mobilization and total shelter care           COPD     Colostomy - colon CA     Chronic Renal Failure     Increased secretions  / Sputum + Proteus mirabilis     persistent  fevers         By signing my name below, I, Emelia Felipe, attest that this documentation has been prepared under the direction and in the presence of Bon Jiménez MD.   Electronically Signed: Georgi Burleson 09-12-22 @ 07:39    I, Bon Jiménez, personally performed the services described in this documentation. All medical record entries made by the scribe were at my direction and in my presence. I have reviewed the chart and agree that the record reflects my personal performance and is accurate and complete.   Bon Jiménez MD.       Discussed with CT surgeon, Physician Assistant - Nurse Practitioner- Critical care medicine team.   Discussed at  AM / PM rounds.   Chart, labs , films reviewed.    Cumulative Critical Care Time Given Today:  30 min

## 2022-09-12 NOTE — PROGRESS NOTE ADULT - ASSESSMENT
71 f with DM, CAD, a-fib, asthma/COPD, Chronic Adrenal Insufficiency on steroids, history of colorectal cancer s/p resection and ostomy, tracheomalacia and multiple intubations, recent admission for sepsis, foot osteo and abscess s/p debridement and OR cx with MRSA, ESBL proteus and corynebacterium s/p 6 weeks of vanco and ertapenem which ended 8/17, now p/w chest pain, found to have  type A aortic dissection, s/p modified Bentall and hemiarch on 9/6/22.   Post op course complicated by shock, respiratory failure, anemia and hyperglycemia.   fever started 9/9, sputum cx with proteus mirabilis    fever post op for aortic dissection, sputum cx with proteus, pt was still febrile on zosyn but last wound cx that showed proteus was ESBL, switched to suraj and still persistently febrile and the proteus now is pan sensitive  pt still intubated but awake, non toxic and WBC 13  blood and urine cx negative  * chest/abd/pelvis CT with contrast  * c/w suraj for now  * monitor WBC/diff and renal function      The above assessment and plan was discussed with CTU    Michelle Rolon MD  contact on teams  After 5pm and on weekends call [phone]

## 2022-09-12 NOTE — PROGRESS NOTE ADULT - SUBJECTIVE AND OBJECTIVE BOX
Patient is a 71y old  Female who presents with a chief complaint of Type A aortic dissection (12 Sep 2022 14:56)       INTERVAL HPI/OVERNIGHT EVENTS:  Patient seen and evaluated at bedside.  Pt is resting comfortable in NAD. Denies N/V/F/C.    Allergies    aspirin (Short breath)  Avelox (Short breath; Pruritus)  cefepime (Anaphylaxis)  codeine (Short breath)  Dilaudid (Short breath)  iodine (Short breath; Swelling)  penicillin (Anaphylaxis)  shellfish (Anaphylaxis)  tetanus toxoid (Short breath)  Valium (Short breath)    Intolerances        Vital Signs Last 24 Hrs  T(C): 38 (12 Sep 2022 17:00), Max: 38.4 (11 Sep 2022 20:00)  T(F): 100.4 (12 Sep 2022 17:00), Max: 101.1 (11 Sep 2022 20:00)  HR: 94 (12 Sep 2022 17:00) (76 - 112)  BP: --  BP(mean): --  RR: 20 (12 Sep 2022 17:00) (8 - 31)  SpO2: 100% (12 Sep 2022 17:00) (98% - 100%)    Parameters below as of 12 Sep 2022 17:00  Patient On (Oxygen Delivery Method): ventilator    O2 Concentration (%): 40    LABS:                        9.1    13.50 )-----------( 176      ( 12 Sep 2022 00:49 )             30.3     09-12    137  |  102  |  17  ----------------------------<  137<H>  4.3   |  27  |  0.53    Ca    8.4      12 Sep 2022 00:49  Phos  2.3     09-12  Mg     2.0     09-12    TPro  6.2  /  Alb  3.4  /  TBili  0.4  /  DBili  0.1  /  AST  37  /  ALT  71<H>  /  AlkPhos  101  09-12        CAPILLARY BLOOD GLUCOSE  195 (12 Sep 2022 17:00)  166 (12 Sep 2022 15:00)  166 (12 Sep 2022 14:00)  166 (12 Sep 2022 10:00)  170 (12 Sep 2022 09:03)  220 (12 Sep 2022 08:00)  147 (11 Sep 2022 23:00)  155 (11 Sep 2022 21:00)  162 (11 Sep 2022 20:00)  155 (11 Sep 2022 19:00)  150 (11 Sep 2022 18:00)      POCT Blood Glucose.: 195 mg/dL (12 Sep 2022 16:53)  POCT Blood Glucose.: 166 mg/dL (12 Sep 2022 14:56)  POCT Blood Glucose.: 161 mg/dL (12 Sep 2022 14:01)  POCT Blood Glucose.: 234 mg/dL (12 Sep 2022 13:16)  POCT Blood Glucose.: 209 mg/dL (12 Sep 2022 12:00)  POCT Blood Glucose.: 166 mg/dL (12 Sep 2022 10:20)  POCT Blood Glucose.: 170 mg/dL (12 Sep 2022 09:33)  POCT Blood Glucose.: 220 mg/dL (12 Sep 2022 08:13)  POCT Blood Glucose.: 212 mg/dL (12 Sep 2022 06:56)  POCT Blood Glucose.: 225 mg/dL (12 Sep 2022 06:01)  POCT Blood Glucose.: 191 mg/dL (12 Sep 2022 04:47)  POCT Blood Glucose.: 208 mg/dL (12 Sep 2022 04:01)  POCT Blood Glucose.: 129 mg/dL (12 Sep 2022 02:57)  POCT Blood Glucose.: 87 mg/dL (12 Sep 2022 02:21)  POCT Blood Glucose.: 106 mg/dL (12 Sep 2022 01:02)  POCT Blood Glucose.: 147 mg/dL (11 Sep 2022 23:08)  POCT Blood Glucose.: 160 mg/dL (11 Sep 2022 22:09)  POCT Blood Glucose.: 155 mg/dL (11 Sep 2022 21:11)  POCT Blood Glucose.: 162 mg/dL (11 Sep 2022 19:49)  POCT Blood Glucose.: 155 mg/dL (11 Sep 2022 18:42)  POCT Blood Glucose.: 150 mg/dL (11 Sep 2022 18:10)      Lower Extremity Physical Exam:  Vascular: DP/PT 2/4 B/L, CFT <3 seconds B/L, Temperature gradient warm to cool B/L  Neuro: Epicritic sensation intact to the level of midfoot B/L  Musculoskeletal/Ortho: unremarkable   Skin: right foot with two navicular wounds with fibrous wound bed, no clinical signs of infection. L foot with no clinical signs of infection.     RADIOLOGY & ADDITIONAL TESTS:

## 2022-09-13 LAB
ALBUMIN SERPL ELPH-MCNC: 2.9 G/DL — LOW (ref 3.3–5)
ALP SERPL-CCNC: 91 U/L — SIGNIFICANT CHANGE UP (ref 40–120)
ALT FLD-CCNC: 60 U/L — HIGH (ref 10–45)
ANION GAP SERPL CALC-SCNC: 9 MMOL/L — SIGNIFICANT CHANGE UP (ref 5–17)
APTT BLD: 34 SEC — SIGNIFICANT CHANGE UP (ref 27.5–35.5)
AST SERPL-CCNC: 33 U/L — SIGNIFICANT CHANGE UP (ref 10–40)
BILIRUB SERPL-MCNC: 0.3 MG/DL — SIGNIFICANT CHANGE UP (ref 0.2–1.2)
BLD GP AB SCN SERPL QL: NEGATIVE — SIGNIFICANT CHANGE UP
BUN SERPL-MCNC: 26 MG/DL — HIGH (ref 7–23)
CALCIUM SERPL-MCNC: 8.8 MG/DL — SIGNIFICANT CHANGE UP (ref 8.4–10.5)
CHLORIDE SERPL-SCNC: 99 MMOL/L — SIGNIFICANT CHANGE UP (ref 96–108)
CO2 SERPL-SCNC: 28 MMOL/L — SIGNIFICANT CHANGE UP (ref 22–31)
CREAT SERPL-MCNC: 0.53 MG/DL — SIGNIFICANT CHANGE UP (ref 0.5–1.3)
EGFR: 98 ML/MIN/1.73M2 — SIGNIFICANT CHANGE UP
GAS PNL BLDA: SIGNIFICANT CHANGE UP
GLUCOSE BLDC GLUCOMTR-MCNC: 104 MG/DL — HIGH (ref 70–99)
GLUCOSE BLDC GLUCOMTR-MCNC: 106 MG/DL — HIGH (ref 70–99)
GLUCOSE BLDC GLUCOMTR-MCNC: 106 MG/DL — HIGH (ref 70–99)
GLUCOSE BLDC GLUCOMTR-MCNC: 110 MG/DL — HIGH (ref 70–99)
GLUCOSE BLDC GLUCOMTR-MCNC: 112 MG/DL — HIGH (ref 70–99)
GLUCOSE BLDC GLUCOMTR-MCNC: 113 MG/DL — HIGH (ref 70–99)
GLUCOSE BLDC GLUCOMTR-MCNC: 116 MG/DL — HIGH (ref 70–99)
GLUCOSE BLDC GLUCOMTR-MCNC: 116 MG/DL — HIGH (ref 70–99)
GLUCOSE BLDC GLUCOMTR-MCNC: 160 MG/DL — HIGH (ref 70–99)
GLUCOSE BLDC GLUCOMTR-MCNC: 161 MG/DL — HIGH (ref 70–99)
GLUCOSE BLDC GLUCOMTR-MCNC: 168 MG/DL — HIGH (ref 70–99)
GLUCOSE BLDC GLUCOMTR-MCNC: 174 MG/DL — HIGH (ref 70–99)
GLUCOSE BLDC GLUCOMTR-MCNC: 185 MG/DL — HIGH (ref 70–99)
GLUCOSE BLDC GLUCOMTR-MCNC: 195 MG/DL — HIGH (ref 70–99)
GLUCOSE BLDC GLUCOMTR-MCNC: 204 MG/DL — HIGH (ref 70–99)
GLUCOSE BLDC GLUCOMTR-MCNC: 206 MG/DL — HIGH (ref 70–99)
GLUCOSE BLDC GLUCOMTR-MCNC: 209 MG/DL — HIGH (ref 70–99)
GLUCOSE SERPL-MCNC: 215 MG/DL — HIGH (ref 70–99)
HCT VFR BLD CALC: 30.2 % — LOW (ref 34.5–45)
HGB BLD-MCNC: 9.4 G/DL — LOW (ref 11.5–15.5)
MAGNESIUM SERPL-MCNC: 2.2 MG/DL — SIGNIFICANT CHANGE UP (ref 1.6–2.6)
MCHC RBC-ENTMCNC: 23.8 PG — LOW (ref 27–34)
MCHC RBC-ENTMCNC: 31.1 GM/DL — LOW (ref 32–36)
MCV RBC AUTO: 76.5 FL — LOW (ref 80–100)
NRBC # BLD: 0 /100 WBCS — SIGNIFICANT CHANGE UP (ref 0–0)
PHOSPHATE SERPL-MCNC: 2.7 MG/DL — SIGNIFICANT CHANGE UP (ref 2.5–4.5)
PLATELET # BLD AUTO: 196 K/UL — SIGNIFICANT CHANGE UP (ref 150–400)
POTASSIUM SERPL-MCNC: 4.8 MMOL/L — SIGNIFICANT CHANGE UP (ref 3.5–5.3)
POTASSIUM SERPL-SCNC: 4.8 MMOL/L — SIGNIFICANT CHANGE UP (ref 3.5–5.3)
PROT SERPL-MCNC: 6.1 G/DL — SIGNIFICANT CHANGE UP (ref 6–8.3)
RBC # BLD: 3.95 M/UL — SIGNIFICANT CHANGE UP (ref 3.8–5.2)
RBC # FLD: SIGNIFICANT CHANGE UP (ref 10.3–14.5)
RH IG SCN BLD-IMP: POSITIVE — SIGNIFICANT CHANGE UP
SODIUM SERPL-SCNC: 136 MMOL/L — SIGNIFICANT CHANGE UP (ref 135–145)
WBC # BLD: 18.91 K/UL — HIGH (ref 3.8–10.5)
WBC # FLD AUTO: 18.91 K/UL — HIGH (ref 3.8–10.5)

## 2022-09-13 PROCEDURE — 99291 CRITICAL CARE FIRST HOUR: CPT

## 2022-09-13 PROCEDURE — 71045 X-RAY EXAM CHEST 1 VIEW: CPT | Mod: 26

## 2022-09-13 PROCEDURE — 99223 1ST HOSP IP/OBS HIGH 75: CPT

## 2022-09-13 PROCEDURE — 99232 SBSQ HOSP IP/OBS MODERATE 35: CPT

## 2022-09-13 PROCEDURE — 99292 CRITICAL CARE ADDL 30 MIN: CPT | Mod: FS,24

## 2022-09-13 RX ORDER — DEXAMETHASONE 0.5 MG/5ML
10 ELIXIR ORAL ONCE
Refills: 0 | Status: COMPLETED | OUTPATIENT
Start: 2022-09-13 | End: 2022-09-13

## 2022-09-13 RX ORDER — NICARDIPINE HYDROCHLORIDE 30 MG/1
1 CAPSULE, EXTENDED RELEASE ORAL
Qty: 40 | Refills: 0 | Status: DISCONTINUED | OUTPATIENT
Start: 2022-09-13 | End: 2022-09-14

## 2022-09-13 RX ORDER — HEPARIN SODIUM 5000 [USP'U]/ML
900 INJECTION INTRAVENOUS; SUBCUTANEOUS
Qty: 25000 | Refills: 0 | Status: DISCONTINUED | OUTPATIENT
Start: 2022-09-13 | End: 2022-09-15

## 2022-09-13 RX ADMIN — Medication 40 MILLIGRAM(S): at 05:51

## 2022-09-13 RX ADMIN — Medication 3 MILLILITER(S): at 05:02

## 2022-09-13 RX ADMIN — MEXILETINE HYDROCHLORIDE 200 MILLIGRAM(S): 150 CAPSULE ORAL at 16:03

## 2022-09-13 RX ADMIN — Medication 0.5 MILLIGRAM(S): at 17:41

## 2022-09-13 RX ADMIN — Medication 500000 UNIT(S): at 05:52

## 2022-09-13 RX ADMIN — SPIRONOLACTONE 25 MILLIGRAM(S): 25 TABLET, FILM COATED ORAL at 17:52

## 2022-09-13 RX ADMIN — Medication 3 MILLILITER(S): at 23:28

## 2022-09-13 RX ADMIN — MEROPENEM 100 MILLIGRAM(S): 1 INJECTION INTRAVENOUS at 21:18

## 2022-09-13 RX ADMIN — Medication 0.5 MILLIGRAM(S): at 05:02

## 2022-09-13 RX ADMIN — CHLORHEXIDINE GLUCONATE 5 MILLILITER(S): 213 SOLUTION TOPICAL at 17:51

## 2022-09-13 RX ADMIN — Medication 25 MILLIGRAM(S): at 17:52

## 2022-09-13 RX ADMIN — Medication 8 MILLIGRAM(S): at 05:52

## 2022-09-13 RX ADMIN — MEXILETINE HYDROCHLORIDE 200 MILLIGRAM(S): 150 CAPSULE ORAL at 21:18

## 2022-09-13 RX ADMIN — SPIRONOLACTONE 25 MILLIGRAM(S): 25 TABLET, FILM COATED ORAL at 05:52

## 2022-09-13 RX ADMIN — MEROPENEM 100 MILLIGRAM(S): 1 INJECTION INTRAVENOUS at 05:53

## 2022-09-13 RX ADMIN — Medication 1 APPLICATION(S): at 12:26

## 2022-09-13 RX ADMIN — CHLORHEXIDINE GLUCONATE 1 APPLICATION(S): 213 SOLUTION TOPICAL at 06:06

## 2022-09-13 RX ADMIN — CHLORHEXIDINE GLUCONATE 5 MILLILITER(S): 213 SOLUTION TOPICAL at 05:52

## 2022-09-13 RX ADMIN — Medication 25 MILLIGRAM(S): at 05:52

## 2022-09-13 RX ADMIN — Medication 102 MILLIGRAM(S): at 14:00

## 2022-09-13 RX ADMIN — Medication 3 MILLILITER(S): at 12:09

## 2022-09-13 RX ADMIN — Medication 500000 UNIT(S): at 12:26

## 2022-09-13 RX ADMIN — PANTOPRAZOLE SODIUM 40 MILLIGRAM(S): 20 TABLET, DELAYED RELEASE ORAL at 12:26

## 2022-09-13 RX ADMIN — MEXILETINE HYDROCHLORIDE 200 MILLIGRAM(S): 150 CAPSULE ORAL at 06:06

## 2022-09-13 RX ADMIN — Medication 500000 UNIT(S): at 17:52

## 2022-09-13 RX ADMIN — Medication 3 MILLILITER(S): at 17:41

## 2022-09-13 RX ADMIN — MEROPENEM 100 MILLIGRAM(S): 1 INJECTION INTRAVENOUS at 16:03

## 2022-09-13 RX ADMIN — Medication 500000 UNIT(S): at 00:39

## 2022-09-13 NOTE — CONSULT NOTE ADULT - ASSESSMENT
71 year-old female with a history of DM2, CAD, A-Fib on apixaban, Severe Persistent Asthma (on chronic steroids, recently started on Tezspire), colon cancer s/p resection/chemo, and tracheobronchomalacia s/p tracheoplasty, and recent OM of the R foot s/b debridement and completed course of Vancomycin/Ertapenem for MRSA/ESBL Proteus/Corynebacterium who now presents with chest pain, found to have Type A dissection s/p repair with modified Bentall procedure and hemiarch replacement on 9/6/22. Post-op course complicated by acute hypoxemic respiratory failure, shock, anemia, and hyperglycemia requiring insulin gtt. Extubated 9/13, now awake and alert with mild encephalopathy but overall significantly improved.    Assessment:  Acute hypoxemic respiratory failure requiring intubation  Type A Aortic Dissection  Severe Persistent Asthma  History of Tracheobronchomalacia    Plan:  - Currently doing well on high flow nasal cannula at 40 LPM, 40% FiO2  - If respiratory status remains stable, would taper to NC@4-6 LPM, goal SpO2>92%  - Continue prednisone at 8 mg daily (chronic dose for her severe persistent asthma)  - Albuterol and ipratropium nebs q6h  - Budesonide 0.5 mg nebs q12h  - Chest PT, incentive spirometry, out of bed to chair  - S/p Tezspire injection on 8/29  - Consider starting montelukast 10 mg at bedtime (takes Zafirlukast as outpatient)  - Remains on heparin gtt for A-Fib + RIJ thrombus  - R arm elevation and warm compresses for superficial thrombophlebitis with edema  - Remains on mexiletine and metoprolol for A-Fib  - Currently appears euvolemic, on furosemide 40 mg oral daily, strict I/O's, close monitoring of kidney function and lytes  - On meropenem for Proteus mirabilis pneumonia + ESBL Proteus mirabilis infection of right foot, f/up ID and podiatry  - Discussed with patient and daughter Zoe at bedside, full code

## 2022-09-13 NOTE — PROGRESS NOTE ADULT - SUBJECTIVE AND OBJECTIVE BOX
Patient seen and examined at the bedside.    Remained critically ill on continuous ICU monitoring.    OBJECTIVE:  Vital Signs Last 24 Hrs  T(C): 37 (13 Sep 2022 08:00), Max: 38.2 (12 Sep 2022 12:00)  T(F): 98.6 (13 Sep 2022 08:00), Max: 100.8 (12 Sep 2022 12:00)  HR: 78 (13 Sep 2022 09:00) (72 - 109)  BP: --  BP(mean): --  RR: 19 (13 Sep 2022 09:00) (12 - 26)  SpO2: 100% (13 Sep 2022 09:00) (98% - 100%)    Parameters below as of 13 Sep 2022 08:00  Patient On (Oxygen Delivery Method): ventilator    O2 Concentration (%): 40      Assessment:  71F PMH DM, COPD, Chronic Adrenal Insufficiency on Chronic prednisone, history of colorectal cancer s/p resection (colostomy bag), Hx of CAD, Chronic A fib on Eliquis, and tracheomalacia and multiple intubations, recent dx of OM presented to ED at [facility] this morning with epigastric pain, belching and central chest pain. Found on CTA to have type A aortic dissection and transferred to [facility] for surgical evaluation by Dr. Cabrera.     Ascending aortic dissection s/p modified bentall and hemiarch on 9/6   Hypovolemic shock   Post op respiratory insufficiency  Acute blood loss anemia  Stress hyperglycemia   RIJ thrombus  Pancreatic cyst      Plan:   ***Neuro***  [x] Sedated with [x] Precedex   Post operative neuro assessment     ***Cardiovascular***  CT Chest on 9/12:  Status post recent ascending aortic dissection repair. Small amount of fluid surrounding the ascending aorta without evidence of enhancing wall to suggest abscess.  RUE duplex on 9/12: There is a thrombosed and occluded right internal jugular vein.  Invasive hemodynamic monitoring, assess perfusion indices   SR  / MAP 84 / Hct 30.2 / Lactate 0.9   [x] Temporary pacing - VVI paced at 50   Continuos reassessment of hemodynamics   Mexiletine for hx of afib   Rate control with Lopressor   Serial EKG and cardiac enzymes     ***Pulmonary***  CT Chest on 9/12: Bilateral lower lobe atelectasis with bilateral pleural effusions   Reintubated for change in mental status on 9/8, self extubated on 9/9 and reintubated again / Continue post op vent management   Titration of FiO2 and PEEP, follow SpO2, CXR, blood gasses   Hx of COPD / Continue bronchodilators and Prednisone     Mode: AC/ CMV (Assist Control/ Continuous Mandatory Ventilation)  RR (machine): 16  TV (machine): 450  FiO2: 40  PEEP: 5  ITime: 0.1  MAP: 10  PIP: 20              ***GI***  CT A/P on 9/12: Mild thickening of the cecum and ascending colon possibly representing mild nonspecific proximal colitis. Hepatic cirrhosis. The focal IPMN of the pancreas with large cyst within the tail the pancreas measuring 3.1 cm.  Colostomy bag in place, continue to monitor output   [x]  Diet:  Glucerna 1.5 @ 60cc/hr   [x] Protonix      ***Renal***  Continue to monitor I/Os, BUN/Creatinine.   Replete lytes PRN  Seven present     Diuresis with Lasix  and Aldactone     ***ID***  SCx on 9/8 +Proteus mirabilis   UA on 9/9+ LE, WBC 60, few yeast  /  UCx on 9/10 NG   Continue meropenem for coverage   Nystatin for thrush   BCx on 9/10 NGTD     ***Endocrine***  [x]  DM : HbA1c 9.4%                - [x] Insulin gtt                - Need tight glycemic control to prevent wound infection.      Patient requires continuous monitoring with bedside rhythm monitoring, pulse oximetry monitoring, and continuous central venous and arterial pressure monitoring; and intermittent blood gas analysis. Care plan discussed with the ICU care team.   Patient remained critical, at risk for life threatening decompensation.    I have spent 30 minutes providing critical care management to this patient.    By signing my name below, I, Emelia Felipe, attest that this documentation has been prepared under the direction and in the presence of Bj Abbott MD   Electronically signed: Georgi Burleson, 09-13-22 @ 09:14    I, Bj Abbott, personally performed the services described in this documentation. all medical record entries made by the marcyibronaldo were at my direction and in my presence. I have reviewed the chart and agree that the record reflects my personal performance and is accurate and complete  Electronically signed: Bj Abbott MD  Patient seen and examined at the bedside.    Remained critically ill on continuous ICU monitoring.    OBJECTIVE:  Vital Signs Last 24 Hrs  T(C): 37 (13 Sep 2022 08:00), Max: 38.2 (12 Sep 2022 12:00)  T(F): 98.6 (13 Sep 2022 08:00), Max: 100.8 (12 Sep 2022 12:00)  HR: 78 (13 Sep 2022 09:00) (72 - 109)  BP: --  BP(mean): --  RR: 19 (13 Sep 2022 09:00) (12 - 26)  SpO2: 100% (13 Sep 2022 09:00) (98% - 100%)    Parameters below as of 13 Sep 2022 08:00  Patient On (Oxygen Delivery Method): ventilator    O2 Concentration (%): 40      Assessment:  71F PMH DM, COPD, Chronic Adrenal Insufficiency on Chronic prednisone, history of colorectal cancer s/p resection (colostomy bag), Hx of CAD, Chronic A fib on Eliquis, and tracheomalacia and multiple intubations, recent dx of OM presented to ED at [facility] this morning with epigastric pain, belching and central chest pain. Found on CTA to have type A aortic dissection and transferred to [facility] for surgical evaluation by Dr. Cabrera.     Ascending aortic dissection s/p modified bentall and hemiarch on 9/6   Hypovolemic shock   Post op respiratory insufficiency  Acute blood loss anemia  Stress hyperglycemia   RIJ thrombus  Pancreatic cyst      Plan:   ***Neuro***  [x] Sedated with [x] low dose Precedex   Post operative neuro assessment     ***Cardiovascular***  CT Chest on 9/12:  Status post recent ascending aortic dissection repair. Small amount of fluid surrounding the ascending aorta without evidence of enhancing wall to suggest abscess.  RUE duplex on 9/12: There is a thrombosed and occluded right internal jugular vein.  Invasive hemodynamic monitoring, assess perfusion indices   SR  / MAP 84 / Hct 30.2 / Lactate 0.9   [x] Temporary pacing - VVI paced at 50   Continuos reassessment of hemodynamics   Mexiletine for hx of afib   Rate control with Lopressor   Serial EKG and cardiac enzymes     ***Pulmonary***  CT Chest on 9/12: Bilateral lower lobe atelectasis with bilateral pleural effusions   Reintubated for change in mental status on 9/8, self extubated on 9/9 and reintubated again   Continue post op vent management / vent weaning as tolerated for anticipated extubation today   Titration of FiO2 and PEEP, follow SpO2, CXR, blood gasses   Hx of COPD / Continue bronchodilators and Prednisone   Monitor secretions and suction PRN     Mode: AC/ CMV (Assist Control/ Continuous Mandatory Ventilation)  RR (machine): 16  TV (machine): 450  FiO2: 40  PEEP: 5  ITime: 0.1  MAP: 10  PIP: 20              ***GI***  CT A/P on 9/12: Mild thickening of the cecum and ascending colon possibly representing mild nonspecific proximal colitis. Hepatic cirrhosis. The focal IPMN of the pancreas with large cyst within the tail the pancreas measuring 3.1 cm.  Colostomy bag in place, continue to monitor output   [x]  Diet:  Glucerna 1.5 @ 60cc/hr   [x] Protonix      ***Renal***  Continue to monitor I/Os, BUN/Creatinine.   Replete lytes PRN  Amezcua present     Diuresis with Lasix  and Aldactone     ***ID***  SCx on 9/8 +Proteus mirabilis   UA on 9/9+ LE, WBC 60, few yeast  /  UCx on 9/10 NG   Continue meropenem for coverage   Nystatin for thrush   BCx on 9/10 NGTD     ***Endocrine***  [x]  DM : HbA1c 9.4%                - [x] Insulin gtt                - Need tight glycemic control to prevent wound infection.      Patient requires continuous monitoring with bedside rhythm monitoring, pulse oximetry monitoring, and continuous central venous and arterial pressure monitoring; and intermittent blood gas analysis. Care plan discussed with the ICU care team.   Patient remained critical, at risk for life threatening decompensation.    I have spent 30 minutes providing critical care management to this patient.    By signing my name below, I, Emelia Felipe, attest that this documentation has been prepared under the direction and in the presence of Bj Abbott MD   Electronically signed: Georgi Burleson, 09-13-22 @ 09:14    I, Bj Abbott, personally performed the services described in this documentation. all medical record entries made by the marcyibronaldo were at my direction and in my presence. I have reviewed the chart and agree that the record reflects my personal performance and is accurate and complete  Electronically signed: Bj Abbott MD

## 2022-09-13 NOTE — PROGRESS NOTE ADULT - ASSESSMENT
71 f with DM, CAD, a-fib, asthma/COPD, Chronic Adrenal Insufficiency on steroids, history of colorectal cancer s/p resection and ostomy, tracheomalacia and multiple intubations, recent admission for sepsis, foot osteo and abscess s/p debridement and OR cx with MRSA, ESBL proteus and corynebacterium s/p 6 weeks of vanco and ertapenem which ended 8/17, now p/w chest pain, found to have  type A aortic dissection, s/p modified Bentall and hemiarch on 9/6/22.   Post op course complicated by shock, respiratory failure, anemia and hyperglycemia.   fever started 9/9, sputum cx with proteus mirabilis    fever post op for aortic dissection, sputum cx with proteus, pt was still febrile on zosyn but last wound cx that showed proteus was ESBL, switched to suraj and still persistently febrile and the proteus now is pan sensitive, chest /abd CT 9/12 with small fluid around the ascending aorta but no enhancing or abscess, b/l lower lobe atelectasis, ?mild colitis  doppler: thrombosed and occluded RIJ  adrenal insufficiency, was on prednisone, was given a dose of dexa today  pt still intubated but awake, non toxic   blood and urine cx negative    * c/w suraj for now  * monitor WBC/diff and renal function      The above assessment and plan was discussed with CTU    Michelle Rolon MD  contact on teams  After 5pm and on weekends call [phone]

## 2022-09-13 NOTE — CONSULT NOTE ADULT - SUBJECTIVE AND OBJECTIVE BOX
[facility] - Division of Pulmonary, Critical Care and Sleep Medicine   Please call [phone] between 8am-pm weekdays, [phone] after hours and weekends      CHIEF COMPLAINT:  initially presented with chest pain due to Type A Dissection    HPI:  This is a 71 year-old female with a history of DM2, CAD, A-Fib on apixaban, Severe Persistent Asthma (on chronic steroids, recently started on Tezspire), colon cancer s/p resection/chemo, and tracheobronchomalacia s/p tracheoplasty, and recent OM of the R foot s/b debridement and completed course of Vancomycin/Ertapenem for MRSA/ESBL Proteus/Corynebacterium who now presents with chest pain, found to have Type A dissection s/p repair with modified Bentall procedure and hemiarch replacement on 9/6/22. Post-op course complicated by acute hypoxemic respiratory failure, shock, anemia, and hyperglycemia requiring insulin gtt. Extubated 9/13, now awake and alert with mild encephalopathy but overall significantly improved.    PAST MEDICAL & SURGICAL HISTORY:  Atrial fibrillation  paroxysmal, on eliquis      Diabetes  Type 2      COPD (chronic obstructive pulmonary disease)      Adrenal insufficiency  Medrol daily for over 50 years      Aortic insufficiency  moderate AR on echo 5/3/2018      Pelvic fracture      Asthma      Tracheobronchomalacia  diagnosed 2015, s/p bronchial thermoplasty 2016 (Dr Zapien); recent bronchoscopy 6/5/2018 revealed no evidence of tracheobronchomalacia in trachea or bronchial tubes      Colorectal cancer  4/2018- last treatment , chemo and radiation      Rectal bleeding      Seizure  x 1 1/7/18      DVT (deep venous thrombosis)  15-20 years ago, took coumadin      TIA (transient ischemic attack)  multiple, last 5 years ago - presents as right-sided weakness      History of partial hysterectomy  30 years ago - fibroids      H/O total knee replacement, bilateral  5 years ago      History of sinus surgery  multiple sinus surgeries      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      H/O pelvic surgery  5 years ago - s/p fracture      History of tracheomalacia  2015 - attempted tracheal stenting ([facility])- course complicated by obstruction, respiratory failure, multiple CPR attempts -  stent discontinued; 10/20/2016 Tracheobronchoplasty (Prolene Mesh) performed at [facility] by Dr Zapien      S/P bronchoscopy  6/5/2018 - [place] (Dr Zapien) no evidence of tracheobronchomalacia in trachea or bronchial tubes      Rectal bleeding  exam under anesthesia ([facility]) 2/2018          FAMILY HISTORY:  Family history of asthma    Family history of breast cancer (Sibling)    Family history of diabetes mellitus type II        SOCIAL HISTORY:  Smoking: [ ] Never Smoked [ ] Former Smoker (__ packs x ___ years) [ ] Current Smoker  (__ packs x ___ years)  Substance Use: [ ] Never Used [ ] Used ____  EtOH Use:  Marital Status: [ ] Single [ ]  [ ]  [ ]   Occupation:  Recent Travel:  Country of Birth:  Advance Directives:    Allergies    aspirin (Short breath)  Avelox (Short breath; Pruritus)  cefepime (Anaphylaxis)  codeine (Short breath)  Dilaudid (Short breath)  iodine (Short breath; Swelling)  penicillin (Anaphylaxis)  shellfish (Anaphylaxis)  tetanus toxoid (Short breath)  Valium (Short breath)      REVIEW OF SYSTEMS:  Constitutional: [ ] fevers [ ] chills [ ] weight loss [ ] weight gain  HEENT: [ ] dry eyes [ ] eye irritation [ ] postnasal drip [ ] nasal congestion  CV: [ ] chest pain [ ] orthopnea [ ] palpitations [ ] murmur  Resp: [ ] cough [ ] shortness of breath [ ] wheezing [ ] sputum [ ] hemoptysis  GI: [ ] nausea [ ] vomiting [ ] diarrhea [ ] constipation [ ] abd pain [ ] dysphagia   : [ ] dysuria [ ] nocturia [ ] hematuria [ ] increased urinary frequency  Musculoskeletal: [ ] myalgias [ ] arthralgias   Skin: [ ] rash [ ] itch  Neurological: [ ] headache [ ] dizziness [ ] syncope [ ] weakness [ ] numbness  Psychiatric: [ ] anxiety [ ] depression  Endocrine: [ ] diabetes [ ] thyroid problem  Hematologic/Lymphatic: [ ] anemia [ ] bleeding problem  Allergic/Immunologic: [ ] itchy eyes [ ] nasal discharge [ ] hives [ ] angioedema  [x] All other systems negative    OBJECTIVE:  ICU Vital Signs Last 24 Hrs  T(C): 37.4 (13 Sep 2022 12:00), Max: 38 (12 Sep 2022 17:00)  T(F): 99.3 (13 Sep 2022 12:00), Max: 100.4 (12 Sep 2022 17:00)  HR: 86 (13 Sep 2022 15:00) (72 - 96)  ABP: 121/52 (13 Sep 2022 15:00) (118/48 - 155/64)  ABP(mean): 74 (13 Sep 2022 15:00) (67 - 94)  RR: 23 (13 Sep 2022 15:00) (12 - 26)  SpO2: 99% (13 Sep 2022 15:00) (99% - 100%)    O2 Parameters below as of 13 Sep 2022 13:59  Patient On (Oxygen Delivery Method): nasal cannula, high flow  O2 Flow (L/min): 40  O2 Concentration (%): 40      Mode: CPAP with PS, FiO2: 40, PEEP: 5, PS: 5, MAP: 7, PIP: 15    09-12 @ 07:01  -  09-13 @ 07:00  --------------------------------------------------------  IN: 2377.7 mL / OUT: 2775 mL / NET: -397.3 mL    09-13 @ 07:01  -  09-13 @ 16:27  --------------------------------------------------------  IN: 556.5 mL / OUT: 1255 mL / NET: -698.5 mL      CAPILLARY BLOOD GLUCOSE  149 (13 Sep 2022 13:00)      POCT Blood Glucose.: 161 mg/dL (13 Sep 2022 13:35)      PHYSICAL EXAM:  General:  NAD; awake and alert, follows commands  Neuro: CN II-XII grossly intact; moving all extremities; generalized weakness  HEENT: NC/AT; EOMI; MMM; +NG tube  CV: normal S1 & S2; regular rate and rhythm; midline surgical wound is c/d/i  Pulm: clear to auscultation bilaterally anteriorly; no wheezing  Abd: soft; NT/ND  Ext: no edema; pulses intact  Skin: warm, well perfused    HOSPITAL MEDICATIONS:  MEDICATIONS  (STANDING):  albuterol/ipratropium for Nebulization 3 milliLiter(s) Nebulizer every 6 hours  buDESOnide    Inhalation Suspension 0.5 milliGRAM(s) Inhalation every 12 hours  chlorhexidine 0.12% Liquid 5 milliLiter(s) Oral Mucosa two times a day  chlorhexidine 2% Cloths 1 Application(s) Topical <User Schedule>  collagenase Ointment 1 Application(s) Topical daily  dexMEDEtomidine Infusion 0.4 MICROgram(s)/kG/Hr (6.77 mL/Hr) IV Continuous <Continuous>  furosemide    Tablet 40 milliGRAM(s) Oral daily  heparin  Infusion 900 Unit(s)/Hr (9 mL/Hr) IV Continuous <Continuous>  insulin regular Infusion 3 Unit(s)/Hr (3 mL/Hr) IV Continuous <Continuous>  meropenem  IVPB 1000 milliGRAM(s) IV Intermittent every 8 hours  metoprolol tartrate 25 milliGRAM(s) Oral two times a day  mexiletine 200 milliGRAM(s) Oral every 8 hours  nystatin    Suspension 540271 Unit(s) Oral every 6 hours  pantoprazole  Injectable 40 milliGRAM(s) IV Push daily  predniSONE   Tablet 8 milliGRAM(s) Oral daily  spironolactone 25 milliGRAM(s) Oral every 12 hours    MEDICATIONS  (PRN):  acetaminophen    Suspension .. 650 milliGRAM(s) Oral every 6 hours PRN Temp greater or equal to 38.5C (101.3F), Mild Pain (1 - 3)      LABS:                        9.4    18.91 )-----------( 196      ( 13 Sep 2022 00:45 )             30.2     Hgb Trend: 9.4<--, 9.1<--, 8.7<--, 8.7<--, 8.4<--  09-13    136  |  99  |  26<H>  ----------------------------<  215<H>  4.8   |  28  |  0.53    Ca    8.8      13 Sep 2022 00:45  Phos  2.7     09-13  Mg     2.2     09-13    TPro  6.1  /  Alb  2.9<L>  /  TBili  0.3  /  DBili  x   /  AST  33  /  ALT  60<H>  /  AlkPhos  91  09-13    Creatinine Trend: 0.53<--, 0.53<--, 0.58<--, 0.71<--, 0.62<--, 0.71<--      Arterial Blood Gas:  09-13 @ 13:35  7.53/35/188/29/97.5/6.2  ABG lactate: --  Arterial Blood Gas:  09-13 @ 12:16  7.55/34/178/30/98.1/7.0  ABG lactate: --  Arterial Blood Gas:  09-13 @ 10:04  7.51/37/151/30/97.3/6.1  ABG lactate: --  Arterial Blood Gas:  09-13 @ 00:15  7.50/38/163/30/98.7/6.0  ABG lactate: --  Arterial Blood Gas:  09-12 @ 00:00  7.45/42/223/29/97.7/4.7  ABG lactate: --      CXR (9/13): clear lungs, ETT above balwinder, NG tube below diaphragm, R chest wall MediPort with tip in SVC, RIJ TLC with tip in SVC, normal heart size; small bilateral pleural effusions, no pneumothorax

## 2022-09-13 NOTE — PROGRESS NOTE ADULT - SUBJECTIVE AND OBJECTIVE BOX
Follow Up:  fever    Interval History: pt was febrile until yesterday, no more fever today    ROS:    Unobtainable because: intubated          Allergies  aspirin (Short breath)  Avelox (Short breath; Pruritus)  cefepime (Anaphylaxis)  codeine (Short breath)  Dilaudid (Short breath)  iodine (Short breath; Swelling)  penicillin (Anaphylaxis)  shellfish (Anaphylaxis)  tetanus toxoid (Short breath)  Valium (Short breath)        ANTIMICROBIALS:  meropenem  IVPB 1000 every 8 hours  nystatin    Suspension [id number] every 6 hours      OTHER MEDS:  acetaminophen    Suspension .. 650 milliGRAM(s) Oral every 6 hours PRN  albuterol/ipratropium for Nebulization 3 milliLiter(s) Nebulizer every 6 hours  buDESOnide    Inhalation Suspension 0.5 milliGRAM(s) Inhalation every 12 hours  chlorhexidine 0.12% Liquid 5 milliLiter(s) Oral Mucosa two times a day  chlorhexidine 2% Cloths 1 Application(s) Topical <User Schedule>  collagenase Ointment 1 Application(s) Topical daily  dexAMETHasone  IVPB 10 milliGRAM(s) IV Intermittent once  dexMEDEtomidine Infusion 0.4 MICROgram(s)/kG/Hr IV Continuous <Continuous>  furosemide    Tablet 40 milliGRAM(s) Oral daily  heparin  Infusion 900 Unit(s)/Hr IV Continuous <Continuous>  insulin regular Infusion 3 Unit(s)/Hr IV Continuous <Continuous>  metoprolol tartrate 25 milliGRAM(s) Oral two times a day  mexiletine 200 milliGRAM(s) Oral every 8 hours  pantoprazole  Injectable 40 milliGRAM(s) IV Push daily  predniSONE   Tablet 8 milliGRAM(s) Oral daily  spironolactone 25 milliGRAM(s) Oral every 12 hours      Vital Signs Last 24 Hrs  T(C): 37.4 (13 Sep 2022 12:00), Max: 38 (12 Sep 2022 17:00)  T(F): 99.3 (13 Sep 2022 12:00), Max: 100.4 (12 Sep 2022 17:00)  HR: 92 (13 Sep 2022 13:59) (72 - 96)  BP: --  BP(mean): --  RR: 22 (13 Sep 2022 13:59) (12 - 26)  SpO2: 100% (13 Sep 2022 13:59) (99% - 100%)    Parameters below as of 13 Sep 2022 13:59  Patient On (Oxygen Delivery Method): nasal cannula, high flow  O2 Flow (L/min): 40  O2 Concentration (%): 40    Physical Exam:  General:    intubated but awake and answered questions with nodding, non toxic  Respiratory:   ET on vent, clear b/l,    no wheezing  abd:     soft,   BS +,   no tenderness  :   no CVAT,  no suprapubic tenderness,   no  ramirez  Musculoskeletal:   no joint swelling  vascular: RIJ CVC, R chest port with no tenderness or erythema  Skin:    no rash                          9.4    18.91 )-----------( 196      ( 13 Sep 2022 00:45 )             30.2       09-13    136  |  99  |  26<H>  ----------------------------<  215<H>  4.8   |  28  |  0.53    Ca    8.8      13 Sep 2022 00:45  Phos  2.7     09-13  Mg     2.2     09-13    TPro  6.1  /  Alb  2.9<L>  /  TBili  0.3  /  DBili  x   /  AST  33  /  ALT  60<H>  /  AlkPhos  91  09-13          MICROBIOLOGY:  v  .Blood Blood  09-12-22   No growth to date.  --  --      .Blood Blood  09-10-22   No growth to date.  --  --      Catheterized Catheterized  09-10-22   <10,000 CFU/mL Normal Urogenital Jessica  --  --      .Sputum Sputum  09-08-22   Numerous Proteus mirabilis  Unable to evaluate further due to Proteus overgrowth  --  Proteus mirabilis                RADIOLOGY:  Images independently visualized and reviewed personally, findings as below  < from: Xray Chest 1 View- PORTABLE-Urgent (Xray Chest 1 View- PORTABLE-Urgent .) (09.13.22 @ 03:03) >  Endotracheal tube with tip above the balwinder. Enteric tube traveling below   the diaphragm. Right chest wall MediPort with tip in the SVC. Right IJ   catheter with tip in the SVC. Sternotomy.  The heart is normal in size.  The lungs are clear.  Small bilateral pleural effusions left greater than right are unchanged.   No pneumothorax.    IMPRESSION:  *  Tubes and lines as above.  *  Stable small bilateral pleural effusions.    < end of copied text >  < from: CT Abdomen and Pelvis w/ IV Cont (09.12.22 @ 16:31) >    IMPRESSION:  1.  Status post recent ascending aortic dissection repair. Small amount   of fluid surrounding the ascending aorta without evidence of enhancing   wall to suggest abscess.  2.  Bilateral lower lobe atelectasis with bilateral pleural effusions.  3.  Mild thickening of the cecum and ascending colon possibly   representing mild nonspecific proximal colitis.  4.  Hepatic cirrhosis.  5.  The focal IPMN of the pancreas with large cyst within the tail the   pancreas measuring 3.1 cm. Follow-up recommended.    < end of copied text >  < from: VA Duplex Upper Ext Vein Scan, Right (09.12.22 @ 13:30) >  IMPRESSION: There is a thrombosed and occluded right internal jugular   vein.      ALLEY Chris notified.    < end of copied text >

## 2022-09-13 NOTE — AIRWAY REMOVAL NOTE  ADULT & PEDS - ARTIFICAL AIRWAY REMOVAL COMMENTS
Written order for extubation verified. The patient was identified by full name and birth date compared to the identification band. Present during the procedure was KIANA Adler and RADHA Najera
Written order for extubation verified. The patient was identified by full name and birth date compared to the identification band. Present during the procedure was cilia RN

## 2022-09-13 NOTE — PROGRESS NOTE ADULT - SUBJECTIVE AND OBJECTIVE BOX
Patient seen and examined at the bedside.    Remained critically ill on continuous ICU monitoring.    OBJECTIVE:  Vital Signs Last 24 Hrs  T(C): 37.4 (13 Sep 2022 20:00), Max: 37.4 (13 Sep 2022 00:00)  T(F): 99.3 (13 Sep 2022 20:00), Max: 99.3 (13 Sep 2022 00:00)  HR: 78 (13 Sep 2022 21:00) (70 - 93)  BP: --  BP(mean): --  RR: 20 (13 Sep 2022 21:00) (12 - 24)  SpO2: 100% (13 Sep 2022 21:00) (99% - 100%)    Parameters below as of 13 Sep 2022 20:00  Patient On (Oxygen Delivery Method): nasal cannula, high flow  O2 Flow (L/min): 40  O2 Concentration (%): 40      Physical Exam:   General: obese, elderly female breathing in sync with the ventilator  Neurology: sedated, but opens eyes, not following commands  Eyes: right pupil reactive to light, left prosthetic eye  ENT/Neck: Neck supple, trachea midline, No JVD, +ETT midline   Respiratory: Clear bilaterally   CV: S1S2, no murmurs        [x] Sternal dressing        [x] Sinus rhythm,  [x] Temporary pacing, - VVI 50  Abdominal: Soft, NT, ND, colostomy in place  Extremities: 1+ pedal edema noted, 2+ UE edema,+ peripheral pulses   Skin: Dry dressing over right heel, no Rashes, Hematoma, Ecchymosis                Assessment:  71F PMH DM, COPD, Chronic Adrenal Insufficiency on Chronic prednisone, history of colorectal cancer s/p resection (colostomy bag), Hx of CAD, Chronic A fib on Eliquis, and tracheomalacia and multiple intubations, recent dx of OM presented to ED at [facility] this morning with epigastric pain, belching and central chest pain. Found on CTA to have type A aortic dissection and transferred to [facility] for surgical evaluation by Dr. Cabrera.     Ascending aortic dissection s/p modified bentall and hemiarch on 9/6   Hypovolemic shock   Post op respiratory insufficiency  Acute blood loss anemia  Stress hyperglycemia   RIJ thrombus  Pancreatic cyst      Plan:   ***Neuro***  [x] Sedated with [x] low dose Precedex   Post operative neuro assessment     ***Cardiovascular***  CT Chest on 9/12:  Status post recent ascending aortic dissection repair. Small amount of fluid surrounding the ascending aorta without evidence of enhancing wall to suggest abscess.  RUE duplex on 9/12: There is a thrombosed and occluded right internal jugular vein.  Invasive hemodynamic monitoring, assess perfusion indices   SR  / MAP 78 / Hct 28.0%/ Lactate 1.6  [x] Temporary pacing - VVI paced at 50   Continuos reassessment of hemodynamics   Mexiletine for hx of afib   Rate control with Lopressor   [x] AC therapy with heparin gtt   Serial EKG and cardiac enzymes     ***Pulmonary***  [x] HFNC 40%/40L  CT Chest on 9/12: Bilateral lower lobe atelectasis with bilateral pleural effusions   Reintubated for change in mental status on 9/8, self extubated on 9/9 and reintubated again   Hx of COPD / Continue bronchodilators and Prednisone   Monitor secretions and suction PRN     ***GI***  CT A/P on 9/12: Mild thickening of the cecum and ascending colon possibly representing mild nonspecific proximal colitis. Hepatic cirrhosis. The focal IPMN of the pancreas with large cyst within the tail the pancreas measuring 3.1 cm.  Colostomy bag in place, continue to monitor output   [x]  Diet:  Glucerna 1.5 @ 60cc/hr   [x] Protonix      ***Renal***  Continue to monitor I/Os, BUN/Creatinine.   Replete lytes PRN  Amezcua present     Diuresis with Lasix  and Aldactone     ***ID***  SCx on 9/8 +Proteus mirabilis   UA on 9/9+ LE, WBC 60, few yeast  /  UCx on 9/10 NG   Continue meropenem for coverage   Nystatin for thrush   BCx on 9/10 NGTD     ***Endocrine***  [x]  DM : HbA1c 9.4%                - [x] Insulin gtt                - Need tight glycemic control to prevent wound infection.      Patient requires continuous monitoring with bedside rhythm monitoring, pulse oximetry monitoring, and continuous central venous and arterial pressure monitoring; and intermittent blood gas analysis. Care plan discussed with the ICU care team.   Patient remained critical, at risk for life threatening decompensation.    I have spent 45 minutes providing critical care management to this patient.    By signing my name below, I, Bharti Barrios, attest that this documentation has been prepared under the direction and in the presence of KIANA Camarillo.  Electronically signed: Georgi Gottlieb, 09-13-22 @ 21:35    I, Mary Huitron, personally performed the services described in this documentation. all medical record entries made by the marcyibe were at my direction and in my presence. I have reviewed the chart and agree that the record reflects my personal performance and is accurate and complete  Electronically signed: KIANA Camarillo. Patient seen and examined at the bedside.    Remained critically ill on continuous ICU monitoring.    OBJECTIVE:  Vital Signs Last 24 Hrs  T(C): 37.4 (13 Sep 2022 20:00), Max: 37.4 (13 Sep 2022 00:00)  T(F): 99.3 (13 Sep 2022 20:00), Max: 99.3 (13 Sep 2022 00:00)  HR: 78 (13 Sep 2022 21:00) (70 - 93)  BP: --  BP(mean): --  RR: 20 (13 Sep 2022 21:00) (12 - 24)  SpO2: 100% (13 Sep 2022 21:00) (99% - 100%)    Parameters below as of 13 Sep 2022 20:00  Patient On (Oxygen Delivery Method): nasal cannula, high flow  O2 Flow (L/min): 40  O2 Concentration (%): 40      Physical Exam:   General: obese, elderly female breathing in sync with the ventilator  Neurology: sedated, but opens eyes, not following commands  Eyes: right pupil reactive to light, left prosthetic eye  ENT/Neck: Neck supple, trachea midline, No JVD, +ETT midline   Respiratory: Clear bilaterally   CV: S1S2, no murmurs        [x] Sternal dressing        [x] Sinus rhythm,  [x] Temporary pacing, - VVI 50  Abdominal: Soft, NT, ND, colostomy in place  Extremities: 1+ pedal edema noted, 2+ UE edema,+ peripheral pulses   Skin: Dry dressing over right heel, no Rashes, Hematoma, Ecchymosis                Assessment:  71F PMH DM, COPD, Chronic Adrenal Insufficiency on Chronic prednisone, history of colorectal cancer s/p resection (colostomy bag), Hx of CAD, Chronic A fib on Eliquis, and tracheomalacia and multiple intubations, recent dx of OM presented to ED at [facility] this morning with epigastric pain, belching and central chest pain. Found on CTA to have type A aortic dissection and transferred to [facility] for surgical evaluation by Dr. Cabrera.     Ascending aortic dissection s/p modified bentall and hemiarch on 9/6   Hypovolemic shock   Post op respiratory insufficiency  Acute blood loss anemia  Stress hyperglycemia   RIJ thrombus  Pancreatic cyst    Proteus PNA    Plan:   ***Neuro***  [x] Sedated with [x] low dose Precedex- wean as tolerated   Post operative neuro assessment     ***Cardiovascular***  CT Chest on 9/12:  Status post recent ascending aortic dissection repair. Small amount of fluid surrounding the ascending aorta without evidence of enhancing wall to suggest abscess.  RUE duplex on 9/12: There is a thrombosed and occluded right internal jugular vein.  Invasive hemodynamic monitoring, assess perfusion indices   SR  / MAP 78 / Hct 28.0%/ Lactate 1.6  [x] Temporary pacing - VVI paced at 50   Continuos reassessment of hemodynamics   Mexiletine for hx of afib   Rate control with Lopressor   [x] AC therapy with heparin gtt   Serial EKG and cardiac enzymes     ***Pulmonary***  [x] HFNC 40%/40L- wean as tolerated to nasal canuula   CT Chest on 9/12: Bilateral lower lobe atelectasis with bilateral pleural effusions   Reintubated for change in mental status on 9/8, self extubated on 9/9 and reintubated again, now extubated on 9/13 to high flow   Hx of COPD / Continue bronchodilators and Prednisone   Monitor secretions and suction PRN     ***GI***  CT A/P on 9/12: Mild thickening of the cecum and ascending colon possibly representing mild nonspecific proximal colitis. Hepatic cirrhosis. The focal IPMN of the pancreas with large cyst within the tail the pancreas measuring 3.1 cm.  Colostomy bag in place, continue to monitor output   [x]  Diet:  Glucerna 1.5 @ 60cc/hr   [x] Protonix   follow up with Speech and swallow tomm     ***Renal***  Continue to monitor I/Os, BUN/Creatinine.   Replete lytes PRN  Amezcua present     Diuresis with Lasix  and Aldactone     ***ID***  SCx on 9/8 +Proteus mirabilis   UA on 9/9+ LE, WBC 60, few yeast  /  UCx on 9/10 NG   Continue meropenem for coverage   Nystatin for thrush   BCx on 9/10 NGTD     ***Endocrine***  [x]  DM : HbA1c 9.4%                - [x] Insulin gtt                - Need tight glycemic control to prevent wound infection.      Patient requires continuous monitoring with bedside rhythm monitoring, pulse oximetry monitoring, and continuous central venous and arterial pressure monitoring; and intermittent blood gas analysis. Care plan discussed with the ICU care team.   Patient remained critical, at risk for life threatening decompensation.    I have spent 45 minutes providing critical care management to this patient.    By signing my name below, I, Bharti Barrios, attest that this documentation has been prepared under the direction and in the presence of KIANA Camarillo.  Electronically signed: Georgi Gottlieb, 09-13-22 @ 21:35    I, Mary Huitron, personally performed the services described in this documentation. all medical record entries made by the scribe were at my direction and in my presence. I have reviewed the chart and agree that the record reflects my personal performance and is accurate and complete  Electronically signed: KIANA Camarillo.

## 2022-09-14 LAB
ALBUMIN SERPL ELPH-MCNC: 3.1 G/DL — LOW (ref 3.3–5)
ALP SERPL-CCNC: 94 U/L — SIGNIFICANT CHANGE UP (ref 40–120)
ALT FLD-CCNC: 212 U/L — HIGH (ref 10–45)
ANION GAP SERPL CALC-SCNC: 12 MMOL/L — SIGNIFICANT CHANGE UP (ref 5–17)
APTT BLD: 39.6 SEC — HIGH (ref 27.5–35.5)
APTT BLD: 39.8 SEC — HIGH (ref 27.5–35.5)
APTT BLD: 42.5 SEC — HIGH (ref 27.5–35.5)
APTT BLD: 49.4 SEC — HIGH (ref 27.5–35.5)
APTT BLD: 73.6 SEC — HIGH (ref 27.5–35.5)
AST SERPL-CCNC: 240 U/L — HIGH (ref 10–40)
BILIRUB SERPL-MCNC: 0.3 MG/DL — SIGNIFICANT CHANGE UP (ref 0.2–1.2)
BUN SERPL-MCNC: 26 MG/DL — HIGH (ref 7–23)
CALCIUM SERPL-MCNC: 8.6 MG/DL — SIGNIFICANT CHANGE UP (ref 8.4–10.5)
CHLORIDE SERPL-SCNC: 101 MMOL/L — SIGNIFICANT CHANGE UP (ref 96–108)
CO2 SERPL-SCNC: 26 MMOL/L — SIGNIFICANT CHANGE UP (ref 22–31)
CREAT SERPL-MCNC: 0.53 MG/DL — SIGNIFICANT CHANGE UP (ref 0.5–1.3)
EGFR: 98 ML/MIN/1.73M2 — SIGNIFICANT CHANGE UP
GAS PNL BLDA: SIGNIFICANT CHANGE UP
GAS PNL BLDA: SIGNIFICANT CHANGE UP
GLUCOSE BLDC GLUCOMTR-MCNC: 102 MG/DL — HIGH (ref 70–99)
GLUCOSE BLDC GLUCOMTR-MCNC: 104 MG/DL — HIGH (ref 70–99)
GLUCOSE BLDC GLUCOMTR-MCNC: 106 MG/DL — HIGH (ref 70–99)
GLUCOSE BLDC GLUCOMTR-MCNC: 109 MG/DL — HIGH (ref 70–99)
GLUCOSE BLDC GLUCOMTR-MCNC: 110 MG/DL — HIGH (ref 70–99)
GLUCOSE BLDC GLUCOMTR-MCNC: 112 MG/DL — HIGH (ref 70–99)
GLUCOSE BLDC GLUCOMTR-MCNC: 113 MG/DL — HIGH (ref 70–99)
GLUCOSE BLDC GLUCOMTR-MCNC: 114 MG/DL — HIGH (ref 70–99)
GLUCOSE BLDC GLUCOMTR-MCNC: 115 MG/DL — HIGH (ref 70–99)
GLUCOSE BLDC GLUCOMTR-MCNC: 116 MG/DL — HIGH (ref 70–99)
GLUCOSE BLDC GLUCOMTR-MCNC: 116 MG/DL — HIGH (ref 70–99)
GLUCOSE BLDC GLUCOMTR-MCNC: 125 MG/DL — HIGH (ref 70–99)
GLUCOSE BLDC GLUCOMTR-MCNC: 132 MG/DL — HIGH (ref 70–99)
GLUCOSE BLDC GLUCOMTR-MCNC: 136 MG/DL — HIGH (ref 70–99)
GLUCOSE BLDC GLUCOMTR-MCNC: 139 MG/DL — HIGH (ref 70–99)
GLUCOSE BLDC GLUCOMTR-MCNC: 150 MG/DL — HIGH (ref 70–99)
GLUCOSE BLDC GLUCOMTR-MCNC: 176 MG/DL — HIGH (ref 70–99)
GLUCOSE BLDC GLUCOMTR-MCNC: 188 MG/DL — HIGH (ref 70–99)
GLUCOSE BLDC GLUCOMTR-MCNC: 206 MG/DL — HIGH (ref 70–99)
GLUCOSE BLDC GLUCOMTR-MCNC: 216 MG/DL — HIGH (ref 70–99)
GLUCOSE BLDC GLUCOMTR-MCNC: 220 MG/DL — HIGH (ref 70–99)
GLUCOSE BLDC GLUCOMTR-MCNC: 91 MG/DL — SIGNIFICANT CHANGE UP (ref 70–99)
GLUCOSE BLDC GLUCOMTR-MCNC: 92 MG/DL — SIGNIFICANT CHANGE UP (ref 70–99)
GLUCOSE SERPL-MCNC: 108 MG/DL — HIGH (ref 70–99)
HCT VFR BLD CALC: 30 % — LOW (ref 34.5–45)
HGB BLD-MCNC: 9.2 G/DL — LOW (ref 11.5–15.5)
MAGNESIUM SERPL-MCNC: 2.4 MG/DL — SIGNIFICANT CHANGE UP (ref 1.6–2.6)
MCHC RBC-ENTMCNC: 23.6 PG — LOW (ref 27–34)
MCHC RBC-ENTMCNC: 30.7 GM/DL — LOW (ref 32–36)
MCV RBC AUTO: 76.9 FL — LOW (ref 80–100)
NRBC # BLD: 0 /100 WBCS — SIGNIFICANT CHANGE UP (ref 0–0)
PHOSPHATE SERPL-MCNC: 2.7 MG/DL — SIGNIFICANT CHANGE UP (ref 2.5–4.5)
PLATELET # BLD AUTO: 281 K/UL — SIGNIFICANT CHANGE UP (ref 150–400)
POTASSIUM SERPL-MCNC: 4.6 MMOL/L — SIGNIFICANT CHANGE UP (ref 3.5–5.3)
POTASSIUM SERPL-SCNC: 4.6 MMOL/L — SIGNIFICANT CHANGE UP (ref 3.5–5.3)
PROT SERPL-MCNC: 6.2 G/DL — SIGNIFICANT CHANGE UP (ref 6–8.3)
RBC # BLD: 3.9 M/UL — SIGNIFICANT CHANGE UP (ref 3.8–5.2)
RBC # FLD: SIGNIFICANT CHANGE UP (ref 10.3–14.5)
SODIUM SERPL-SCNC: 139 MMOL/L — SIGNIFICANT CHANGE UP (ref 135–145)
WBC # BLD: 23.26 K/UL — HIGH (ref 3.8–10.5)
WBC # FLD AUTO: 23.26 K/UL — HIGH (ref 3.8–10.5)

## 2022-09-14 PROCEDURE — 99291 CRITICAL CARE FIRST HOUR: CPT

## 2022-09-14 PROCEDURE — 71045 X-RAY EXAM CHEST 1 VIEW: CPT | Mod: 26

## 2022-09-14 PROCEDURE — 99222 1ST HOSP IP/OBS MODERATE 55: CPT | Mod: GC

## 2022-09-14 PROCEDURE — 99232 SBSQ HOSP IP/OBS MODERATE 35: CPT

## 2022-09-14 RX ORDER — LISINOPRIL 2.5 MG/1
2.5 TABLET ORAL DAILY
Refills: 0 | Status: DISCONTINUED | OUTPATIENT
Start: 2022-09-14 | End: 2022-09-16

## 2022-09-14 RX ORDER — FENTANYL CITRATE 50 UG/ML
50 INJECTION INTRAVENOUS ONCE
Refills: 0 | Status: DISCONTINUED | OUTPATIENT
Start: 2022-09-14 | End: 2022-09-14

## 2022-09-14 RX ORDER — INSULIN LISPRO 100/ML
VIAL (ML) SUBCUTANEOUS
Refills: 0 | Status: DISCONTINUED | OUTPATIENT
Start: 2022-09-14 | End: 2022-09-14

## 2022-09-14 RX ORDER — CEFTRIAXONE 500 MG/1
1000 INJECTION, POWDER, FOR SOLUTION INTRAMUSCULAR; INTRAVENOUS EVERY 24 HOURS
Refills: 0 | Status: DISCONTINUED | OUTPATIENT
Start: 2022-09-14 | End: 2022-09-16

## 2022-09-14 RX ORDER — INSULIN LISPRO 100/ML
10 VIAL (ML) SUBCUTANEOUS
Refills: 0 | Status: DISCONTINUED | OUTPATIENT
Start: 2022-09-14 | End: 2022-09-14

## 2022-09-14 RX ORDER — INSULIN GLARGINE 100 [IU]/ML
35 INJECTION, SOLUTION SUBCUTANEOUS AT BEDTIME
Refills: 0 | Status: DISCONTINUED | OUTPATIENT
Start: 2022-09-14 | End: 2022-09-14

## 2022-09-14 RX ADMIN — Medication 0.5 MILLIGRAM(S): at 17:55

## 2022-09-14 RX ADMIN — Medication 500000 UNIT(S): at 11:23

## 2022-09-14 RX ADMIN — Medication 3 MILLILITER(S): at 17:55

## 2022-09-14 RX ADMIN — CHLORHEXIDINE GLUCONATE 1 APPLICATION(S): 213 SOLUTION TOPICAL at 05:23

## 2022-09-14 RX ADMIN — Medication 1 APPLICATION(S): at 11:25

## 2022-09-14 RX ADMIN — CEFTRIAXONE 100 MILLIGRAM(S): 500 INJECTION, POWDER, FOR SOLUTION INTRAMUSCULAR; INTRAVENOUS at 17:30

## 2022-09-14 RX ADMIN — SPIRONOLACTONE 25 MILLIGRAM(S): 25 TABLET, FILM COATED ORAL at 05:11

## 2022-09-14 RX ADMIN — Medication 3 MILLILITER(S): at 23:38

## 2022-09-14 RX ADMIN — MEXILETINE HYDROCHLORIDE 200 MILLIGRAM(S): 150 CAPSULE ORAL at 05:09

## 2022-09-14 RX ADMIN — Medication 40 MILLIGRAM(S): at 05:11

## 2022-09-14 RX ADMIN — Medication 25 MILLIGRAM(S): at 05:12

## 2022-09-14 RX ADMIN — Medication 650 MILLIGRAM(S): at 12:24

## 2022-09-14 RX ADMIN — Medication 25 MILLIGRAM(S): at 17:41

## 2022-09-14 RX ADMIN — SPIRONOLACTONE 25 MILLIGRAM(S): 25 TABLET, FILM COATED ORAL at 17:41

## 2022-09-14 RX ADMIN — Medication 500000 UNIT(S): at 05:08

## 2022-09-14 RX ADMIN — Medication 650 MILLIGRAM(S): at 11:54

## 2022-09-14 RX ADMIN — PANTOPRAZOLE SODIUM 40 MILLIGRAM(S): 20 TABLET, DELAYED RELEASE ORAL at 11:23

## 2022-09-14 RX ADMIN — FENTANYL CITRATE 50 MICROGRAM(S): 50 INJECTION INTRAVENOUS at 15:59

## 2022-09-14 RX ADMIN — FENTANYL CITRATE 50 MICROGRAM(S): 50 INJECTION INTRAVENOUS at 15:29

## 2022-09-14 RX ADMIN — LISINOPRIL 2.5 MILLIGRAM(S): 2.5 TABLET ORAL at 11:54

## 2022-09-14 RX ADMIN — MEXILETINE HYDROCHLORIDE 200 MILLIGRAM(S): 150 CAPSULE ORAL at 21:14

## 2022-09-14 RX ADMIN — MEROPENEM 100 MILLIGRAM(S): 1 INJECTION INTRAVENOUS at 05:08

## 2022-09-14 RX ADMIN — CHLORHEXIDINE GLUCONATE 5 MILLILITER(S): 213 SOLUTION TOPICAL at 05:08

## 2022-09-14 RX ADMIN — MEXILETINE HYDROCHLORIDE 200 MILLIGRAM(S): 150 CAPSULE ORAL at 12:25

## 2022-09-14 RX ADMIN — Medication 3 MILLILITER(S): at 12:47

## 2022-09-14 RX ADMIN — Medication 500000 UNIT(S): at 01:13

## 2022-09-14 RX ADMIN — Medication 8 MILLIGRAM(S): at 05:09

## 2022-09-14 NOTE — PROGRESS NOTE ADULT - SUBJECTIVE AND OBJECTIVE BOX
Follow Up:  fever    Interval History/ROS: pt was extubated yesterday and no more fevers, denied cough or diarrhea      Allergies  aspirin (Short breath)  Avelox (Short breath; Pruritus)  cefepime (Anaphylaxis)  codeine (Short breath)  Dilaudid (Short breath)  iodine (Short breath; Swelling)  penicillin (Anaphylaxis)  shellfish (Anaphylaxis)  tetanus toxoid (Short breath)  Valium (Short breath)        ANTIMICROBIALS:  nystatin    Suspension [id number] every 6 hours      OTHER MEDS:  acetaminophen    Suspension .. 650 milliGRAM(s) Oral every 6 hours PRN  albuterol/ipratropium for Nebulization 3 milliLiter(s) Nebulizer every 6 hours  buDESOnide    Inhalation Suspension 0.5 milliGRAM(s) Inhalation every 12 hours  chlorhexidine 0.12% Liquid 5 milliLiter(s) Oral Mucosa two times a day  chlorhexidine 2% Cloths 1 Application(s) Topical <User Schedule>  collagenase Ointment 1 Application(s) Topical daily  furosemide    Tablet 40 milliGRAM(s) Oral daily  heparin  Infusion 900 Unit(s)/Hr IV Continuous <Continuous>  insulin regular Infusion 3 Unit(s)/Hr IV Continuous <Continuous>  lisinopril 2.5 milliGRAM(s) Oral daily  metoprolol tartrate 25 milliGRAM(s) Oral two times a day  mexiletine 200 milliGRAM(s) Oral every 8 hours  pantoprazole  Injectable 40 milliGRAM(s) IV Push daily  predniSONE   Tablet 8 milliGRAM(s) Oral daily  spironolactone 25 milliGRAM(s) Oral every 12 hours      Vital Signs Last 24 Hrs  T(C): 37.1 (14 Sep 2022 12:00), Max: 37.4 (13 Sep 2022 16:00)  T(F): 98.8 (14 Sep 2022 12:00), Max: 99.3 (13 Sep 2022 16:00)  HR: 80 (14 Sep 2022 13:00) (70 - 93)  BP: --  BP(mean): --  RR: 27 (14 Sep 2022 13:00) (16 - 35)  SpO2: 100% (14 Sep 2022 13:00) (96% - 100%)    Parameters below as of 14 Sep 2022 12:00  Patient On (Oxygen Delivery Method): room air  O2 Flow (L/min): 2      Physical Exam:  General:  extubated and sitting on a chair, NAD  Respiratory:   clear b/l,    no wheezing  abd:     soft,   BS +,   no tenderness  :   no CVAT,  no suprapubic tenderness,   no  ramirez  Musculoskeletal:   no joint swelling  vascular: R chest port with no tenderness or erythema  Skin:    no rash                          9.2    23.26 )-----------( 281      ( 14 Sep 2022 01:15 )             30.0       09-14    139  |  101  |  26<H>  ----------------------------<  108<H>  4.6   |  26  |  0.53    Ca    8.6      14 Sep 2022 01:15  Phos  2.7     09-14  Mg     2.4     09-14    TPro  6.2  /  Alb  3.1<L>  /  TBili  0.3  /  DBili  x   /  AST  240<H>  /  ALT  212<H>  /  AlkPhos  94  09-14          MICROBIOLOGY:  v  .Blood Blood  09-12-22   No growth to date.  --  --      .Blood Blood  09-10-22   No growth to date.  --  --      Catheterized Catheterized  09-10-22   <10,000 CFU/mL Normal Urogenital Jessica  --  --      .Sputum Sputum  09-08-22   Numerous Proteus mirabilis  Unable to evaluate further due to Proteus overgrowth  --  Proteus mirabilis                RADIOLOGY:  Images independently visualized and reviewed personally, findings as below  < from: Xray Chest 1 View- PORTABLE-Routine (Xray Chest 1 View- PORTABLE-Routine in AM.) (09.14.22 @ 02:29) >  IMPRESSION:    Interval removal of endotracheal tube. Decreased small left pleural   effusion.    < end of copied text >  < from: CT Abdomen and Pelvis w/ IV Cont (09.12.22 @ 16:31) >  IMPRESSION:  1.  Status post recent ascending aortic dissection repair. Small amount   of fluid surrounding the ascending aorta without evidence of enhancing   wall to suggest abscess.  2.  Bilateral lower lobe atelectasis with bilateral pleural effusions.  3.  Mild thickening of the cecum and ascending colon possibly   representing mild nonspecific proximal colitis.  4.  Hepatic cirrhosis.  5.  The focal IPMN of the pancreas with large cyst within the tail the   pancreas measuring 3.1 cm. Follow-up recommended.      < end of copied text >  < from: Intra-Operative Transesophageal Echo (09.06.22 @ 22:54) >  Conclusions:  1. Normal mitral valve. Mild mitral regurgitation.  2. Calcified trileaflet aortic valve with decreased  opening. Moderate aortic regurgitation.  3. Aortic Annulus: 1.9 cm.  Aortic Root: 3.9 cm.  Sinotubular Junction: 4 cm.  LVOT diameter: 1.9 cm.  A dissection flap was noted in the proximal ascending aorta  extending into arch and decscending thoracic and abdominal  aorta. A clear end of dissection was by SAFIA in transgastric  views.  SAFIA guidaince for aortic cannulation of true lumen  provided.  4. Moderate concentric left ventricular hypertrophy.  5. Normal left ventricular systolic function. No segmental  wall motion abnormalities.  6. Normal right atrium.  7. Normal right ventricular size and function.  8. Normal tricuspid valve. Mild tricuspid regurgitation.  9. Moderate pericardial effusion with no tamponade noted  pre-bypass    < end of copied text >

## 2022-09-14 NOTE — PROGRESS NOTE ADULT - SUBJECTIVE AND OBJECTIVE BOX
Patient seen and examined at the bedside.    Remained critically ill on continuous ICU monitoring.    OBJECTIVE:  Vital Signs Last 24 Hrs  T(C): 37.1 (14 Sep 2022 08:00), Max: 37.4 (13 Sep 2022 12:00)  T(F): 98.8 (14 Sep 2022 08:00), Max: 99.3 (13 Sep 2022 12:00)  HR: 86 (14 Sep 2022 08:00) (70 - 93)  BP: --  BP(mean): --  RR: 25 (14 Sep 2022 08:00) (16 - 35)  SpO2: 97% (14 Sep 2022 08:00) (96% - 100%)    Parameters below as of 14 Sep 2022 05:00  Patient On (Oxygen Delivery Method): nasal cannula  O2 Flow (L/min): 6        Assessment:  71F PMH DM, COPD, Chronic Adrenal Insufficiency on Chronic prednisone, history of colorectal cancer s/p resection (colostomy bag), Hx of CAD, Chronic A fib on Eliquis, and tracheomalacia and multiple intubations, recent dx of OM presented to ED at [facility] this morning with epigastric pain, belching and central chest pain. Found on CTA to have type A aortic dissection and transferred to [facility] for surgical evaluation by Dr. Cabrera.     Ascending aortic dissection s/p modified bentall and hemiarch on 9/6   Hypovolemic shock   Post op respiratory insufficiency  Acute blood loss anemia  Stress hyperglycemia   RIJ thrombus  Pancreatic cyst    Proteus PNA    Plan:   ***Neuro***  [x] Nonfocal   Post operative neuro assessment     ***Cardiovascular***  CT Chest on 9/12:  Status post recent ascending aortic dissection repair. Small amount of fluid surrounding the ascending aorta without evidence of enhancing wall to suggest abscess.  RUE duplex on 9/12: There is a thrombosed and occluded right internal jugular vein.  Invasive hemodynamic monitoring, assess perfusion indices   SR  / MAP 85 / Hct 30.0%/ Lactate 1.6  [x] Temporary pacing - VVI paced at 50   Continuos reassessment of hemodynamics   Mexiletine for hx of afib   Rate control with Lopressor   [x] AC therapy with heparin gtt, PTT goal 50-60    Serial EKG and cardiac enzymes     ***Pulmonary***  CT Chest on 9/12: Bilateral lower lobe atelectasis with bilateral pleural effusions   Reintubated for change in mental status on 9/8, self extubated on 9/9 and reintubated again, extubated to HFO2 on 9/13   [x] 6L NC   Encourage incentive spirometry, continue pulse ox monitoring, follow ABGs   Hx of COPD / Continue bronchodilators and Prednisone   Monitor secretions and suction PRN     Mode: CPAP with PS  FiO2: 40  PEEP: 5  PS: 5  MAP: 7  PIP: 15              ***GI***  CT A/P on 9/12: Mild thickening of the cecum and ascending colon possibly representing mild nonspecific proximal colitis. Hepatic cirrhosis. The focal IPMN of the pancreas with large cyst within the tail the pancreas measuring 3.1 cm.  Colostomy bag in place, continue to monitor output   [x]  Diet:  Glucerna 1.5 @ 60cc/hr / For S&S evaluation today   [x] Protonix     ***Renal***  Continue to monitor I/Os, BUN/Creatinine.   Replete lytes PRN  Amezcua present     Diuresis with Lasix  and Aldactone     ***ID***  SCx on 9/8 +Proteus mirabilis   UA on 9/9+ LE, WBC 60, few yeast  /  UCx on 9/10 NG   Continue with Meropenem for coverage   BCx on 9/10 NGTD, BCx on 9/12 NGTD   Nystatin for thrush     ***Endocrine***  [x]  DM : HbA1c 9.4%                - [x] Insulin gtt                - Need tight glycemic control to prevent wound infection.          Patient requires continuous monitoring with bedside rhythm monitoring, pulse oximetry monitoring, and continuous central venous and arterial pressure monitoring; and intermittent blood gas analysis. Care plan discussed with the ICU care team.   Patient remained critical, at risk for life threatening decompensation.    I have spent 30 minutes providing critical care management to this patient.    By signing my name below, I, Emelia Felipe, attest that this documentation has been prepared under the direction and in the presence of Bj Abbott MD   Electronically signed: Georgi Burleson, 09-14-22 @ 08:16    I, Bj Abbott, personally performed the services described in this documentation. all medical record entries made by the marcyibronaldo were at my direction and in my presence. I have reviewed the chart and agree that the record reflects my personal performance and is accurate and complete  Electronically signed: Bj Abbott MD  Patient seen and examined at the bedside.    Remained critically ill on continuous ICU monitoring.    OBJECTIVE:  Vital Signs Last 24 Hrs  T(C): 37.1 (14 Sep 2022 08:00), Max: 37.4 (13 Sep 2022 12:00)  T(F): 98.8 (14 Sep 2022 08:00), Max: 99.3 (13 Sep 2022 12:00)  HR: 86 (14 Sep 2022 08:00) (70 - 93)  BP: --  BP(mean): --  RR: 25 (14 Sep 2022 08:00) (16 - 35)  SpO2: 97% (14 Sep 2022 08:00) (96% - 100%)    Parameters below as of 14 Sep 2022 05:00  Patient On (Oxygen Delivery Method): nasal cannula  O2 Flow (L/min): 6        Assessment:  71F PMH DM, COPD, Chronic Adrenal Insufficiency on Chronic prednisone, history of colorectal cancer s/p resection (colostomy bag), Hx of CAD, Chronic A fib on Eliquis, and tracheomalacia and multiple intubations, recent dx of OM presented to ED at [facility] this morning with epigastric pain, belching and central chest pain. Found on CTA to have type A aortic dissection and transferred to [facility] for surgical evaluation by Dr. Cabrera.     Ascending aortic dissection s/p modified bentall and hemiarch on 9/6   Hypovolemic shock   Post op respiratory insufficiency  Acute blood loss anemia  Stress hyperglycemia   RIJ thrombus  Pancreatic cyst    Proteus PNA    Plan:   ***Neuro***  [x] Nonfocal   Post operative neuro assessment     ***Cardiovascular***  CT Chest on 9/12:  Status post recent ascending aortic dissection repair. Small amount of fluid surrounding the ascending aorta without evidence of enhancing wall to suggest abscess.  RUE duplex on 9/12: There is a thrombosed and occluded RIJ   Invasive hemodynamic monitoring, assess perfusion indices   SR  / MAP 85 / Hct 30.0%/ Lactate 1.6  [x] Cardene - dc'd this AM / Blood pressure management with Lopressor, plan to also start home Lisinopril   [x] Temporary pacing - VVI paced at 50   Continuos reassessment of hemodynamics   Mexiletine for hx of afib   [x] AC therapy with heparin gtt, PTT goal 50-60    Serial EKG and cardiac enzymes     ***Pulmonary***  CT Chest on 9/12: Bilateral lower lobe atelectasis with bilateral pleural effusions   Reintubated for change in mental status on 9/8, self extubated on 9/9 and reintubated again, extubated to HFO2 on 9/13     [x] 6L NC   Encourage incentive spirometry, continue pulse ox monitoring, follow ABGs   Hx of COPD / Continue bronchodilators and Prednisone   Monitor secretions and suction PRN     Mode: CPAP with PS  FiO2: 40  PEEP: 5  PS: 5  MAP: 7  PIP: 15              ***GI***  CT A/P on 9/12: Mild thickening of the cecum and ascending colon possibly representing mild nonspecific proximal colitis. Hepatic cirrhosis. The focal IPMN of the pancreas with large cyst within the tail the pancreas measuring 3.1 cm.  Colostomy bag in place, continue to monitor output   [x]  Diet:  Glucerna 1.5 @ 60cc/hr / tentatively planned for S&S evaluation today   [x] Protonix     ***Renal***  Continue to monitor I/Os, BUN/Creatinine.   Replete lytes PRN  Amezcua present     Diuresis with Lasix  and Aldactone     ***ID***  SCx on 9/8 +Proteus mirabilis   UA on 9/9+ LE, WBC 60, few yeast  /  UCx on 9/10 NG   Plan to switch Meropenem to Levofloxacin as per ID recs   BCx on 9/10 NGTD, BCx on 9/12 NGTD   Nystatin for thrush     ***Endocrine***  [x]  DM : HbA1c 9.4%                - [x] Insulin gtt                - Need tight glycemic control to prevent wound infection.          Patient requires continuous monitoring with bedside rhythm monitoring, pulse oximetry monitoring, and continuous central venous and arterial pressure monitoring; and intermittent blood gas analysis. Care plan discussed with the ICU care team.   Patient remained critical, at risk for life threatening decompensation.    I have spent 30 minutes providing critical care management to this patient.    By signing my name below, I, Emelia Felipe, attest that this documentation has been prepared under the direction and in the presence of Bj Abbott MD   Electronically signed: Rogers Burleson, 09-14-22 @ 08:16    IBj, personally performed the services described in this documentation. all medical record entries made by the rogers were at my direction and in my presence. I have reviewed the chart and agree that the record reflects my personal performance and is accurate and complete  Electronically signed: Bj Abbott MD

## 2022-09-14 NOTE — CONSULT NOTE ADULT - ATTENDING COMMENTS
Patient is a 69-year-old woman with history of type 2 diabetes, COPD, iatrogenic adrenal insufficiency secondary to chronic prednisone, chronic A. fib on Eliquis, tracheomalacia and multiple intubations, presented with type a aortic dissection.  Endocrine consulted for the assistance of insulin drip transition.  At home patient takes Lantus 35 units at bedtime, NovoLog 10 units 3 times daily with meals.  Patient is on high insulin drip rate here, most recently rate was 5 units/h.  Patient was on high-dose steroids which was discontinued approximately 2 days ago.  She is on prednisone 8 mg daily standing, she was also on tube feeds continuously while in the ICU.  Now starting p.o. diet.  Only eating about 20 to 30% of her meals.  CTICU team is starting calorie counts.  In the meanwhile, recommend Lantus 30 units at bedtime, Admelog 10 units 3 times daily with meals.  Recommend low-dose Admelog correction scale before every meal at bedtime.  Regarding blood pressure management, BP goal <130/80 given history of diabetes, but given recent aortic dissection, management as per primary CTICU team.  Regarding hyperlipidemia, continue with statin therapy.  Endocrinology will continue to follow and make further recommendations of insulin titration.

## 2022-09-14 NOTE — CONSULT NOTE ADULT - SUBJECTIVE AND OBJECTIVE BOX
HPI:  71F PMH DM2, COPD, secondary Adrenal Insufficiency on Chronic prednisone, history of colorectal cancer s/p resection (colostomy bag), ? Hx of CAD, Chronic A fib on Eliquis, and tracheomalacia and multiple intubations, recent dx of OM presented to ED at [facility] with epigastric pain, belching and central chest pain. Found on CTA to have type A aortic dissection and transferred to [facility] for surgical evaluation by Dr. Cabrera. (06 Sep 2022 16:32). Now POD #8 after dissection repair. Endocrine consulted for assistance with transition from insulin drip to subcutaneous insulin.    DM diagnosis: DM2 as teenager  Last A1c: 9.4  Endocrinologist: Dr. Saavedra  Home DM meds: Lantus 35 units qhs. Novolog TIDac, counts carbs so doses vary but usually takes around 10 units pre-meal. If BG high after meals, sometimes takes an additional 2 units Novolog. Of note, patient takes medrol 4mg daily at home.  Microvascular complications: None  Macrovascular complications: ?CAD  SMBG: Fasting BG low 100, infrequent lows. Later in the day can be as high as 300.  Symptoms: No sxs of high or low BG  Diet at home: Sometimes snacks on fruit, counts carbs  Appetite in the hospital: Getting glucerna through NG tube but switching to PO diet  PMHx: As above. No known hx of HF, pancreatitis or UTIs.  ROS positive for chronic SOB and cough, chronic BP and foot wound on right.    PAST MEDICAL & SURGICAL HISTORY:  Atrial fibrillation  paroxysmal, on eliquis      Diabetes  Type 2      COPD (chronic obstructive pulmonary disease)      Adrenal insufficiency  Medrol daily for over 50 years      Aortic insufficiency  moderate AR on echo 5/3/2018      Pelvic fracture      Asthma      Tracheobronchomalacia  diagnosed 2015, s/p bronchial thermoplasty 2016 (Dr Zapien); recent bronchoscopy 6/5/2018 revealed no evidence of tracheobronchomalacia in trachea or bronchial tubes      Colorectal cancer  4/2018- last treatment , chemo and radiation      Rectal bleeding      Seizure  x 1 1/7/18      DVT (deep venous thrombosis)  15-20 years ago, took coumadin      TIA (transient ischemic attack)  multiple, last 5 years ago - presents as right-sided weakness      History of partial hysterectomy  30 years ago - fibroids      H/O total knee replacement, bilateral  5 years ago      History of sinus surgery  multiple sinus surgeries      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      H/O pelvic surgery  5 years ago - s/p fracture      History of tracheomalacia  2015 - attempted tracheal stenting ([facility])- course complicated by obstruction, respiratory failure, multiple CPR attempts -  stent discontinued; 10/20/2016 Tracheobronchoplasty (Prolene Mesh) performed at [facility] by Dr Zapien      S/P bronchoscopy  6/5/2018 - [place] [facility] (Dr Zapien) no evidence of tracheobronchomalacia in trachea or bronchial tubes      Rectal bleeding  exam under anesthesia ([facility]) 2/2018          FAMILY HISTORY:  Family history of asthma    Family history of breast cancer (Sibling)    Family history of diabetes mellitus type II - mom, dad, sister    No known FHx of thyroid cancer.    Social History: No tobacco or ETOH use.    Outpatient Medications: Home Medications:  Admelog 100 units/mL injectable solution: 9 unit(s) injectable 3 times a day (before meals) (11 Jul 2022 17:53)  apixaban 5 mg oral tablet: 1 tab(s) orally every 12 hours (10 May 2022 18:50)  ascorbic acid 500 mg oral tablet: 1 tab(s) orally once a day (10 May 2022 18:50)  budesonide-formoterol 160 mcg-4.5 mcg/inh inhalation aerosol: 2 puff(s) inhaled 2 times a day (13 May 2022 12:17)  Crestor 5 mg oral tablet: 1 tab(s) orally once a day (at bedtime) (10 May 2022 18:50)  dilTIAZem 60 mg oral tablet: 1 tab(s) orally 2 times a day (08 Jul 2022 16:28)  ertapenem 1 g injection: 1 gram(s) intravenous once a day (11 Jul 2022 17:54)  fluticasone 50 mcg/inh nasal spray: 1 spray(s) nasal 2 times a day (10 May 2022 18:50)  hydrOXYzine hydrochloride 25 mg oral tablet: 1 tab(s) orally 2 times a day, As needed, Itching x 7 days (13 May 2022 12:17)  insulin glargine 100 units/mL subcutaneous solution: 15 unit(s) subcutaneous once a day (at bedtime) (11 Jul 2022 17:53)  ipratropium-albuterol 0.5 mg-2.5 mg/3 mL inhalation solution: 3 milliliter(s) inhaled every 6 hours (10 May 2022 18:50)  lactulose 10 g/15 mL oral syrup: 22.5 milliliter(s) orally once a day (10 May 2022 18:50)  lisinopril 5 mg oral tablet: 1 tab(s) orally once a day (11 Jul 2022 13:49)  methylPREDNISolone 8 mg oral tablet: 1 tab(s) orally once a day (10 May 2022 18:50)  mexiletine 200 mg oral capsule: 1 cap(s) orally 3 times a day (10 May 2022 18:50)  Multiple Vitamins oral tablet: 1 tab(s) orally once a day (10 May 2022 18:50)  oxycodone-acetaminophen 5 mg-325 mg oral tablet: 1 tab(s) orally every 6 hours, As needed, Severe Pain (7 - 10) (11 Jul 2022 13:49)  pantoprazole 40 mg oral delayed release tablet: 1 tab(s) orally once a day (before a meal) (10 May 2022 18:50)  polyethylene glycol 3350 oral powder for reconstitution: 17 gram(s) orally 2 times a day (10 May 2022 18:50)  pregabalin 150 mg oral capsule: 1 cap(s) orally 2 times a day (10 May 2022 18:50)  senna oral tablet: 2 tab(s) orally once a day (at bedtime) (10 May 2022 18:50)  Spiriva 18 mcg inhalation capsule: 1 cap(s) inhaled once a day (10 May 2022 18:50)  vancomycin 1 g intravenous injection: 1 gram(s) intravenous every 12 hours (08 Jul 2022 16:28)  zinc sulfate 220 mg oral capsule: 1 cap(s) orally once a day (10 May 2022 18:50)      MEDICATIONS  (STANDING):  albuterol/ipratropium for Nebulization 3 milliLiter(s) Nebulizer every 6 hours  buDESOnide    Inhalation Suspension 0.5 milliGRAM(s) Inhalation every 12 hours  cefTRIAXone   IVPB 1000 milliGRAM(s) IV Intermittent every 24 hours  chlorhexidine 0.12% Liquid 5 milliLiter(s) Oral Mucosa two times a day  chlorhexidine 2% Cloths 1 Application(s) Topical <User Schedule>  collagenase Ointment 1 Application(s) Topical daily  furosemide    Tablet 40 milliGRAM(s) Oral daily  heparin  Infusion 900 Unit(s)/Hr (15.5 mL/Hr) IV Continuous <Continuous>  insulin regular Infusion 3 Unit(s)/Hr (3 mL/Hr) IV Continuous <Continuous>  lisinopril 2.5 milliGRAM(s) Oral daily  metoprolol tartrate 25 milliGRAM(s) Oral two times a day  mexiletine 200 milliGRAM(s) Oral every 8 hours  nystatin    Suspension 707700 Unit(s) Oral every 6 hours  pantoprazole  Injectable 40 milliGRAM(s) IV Push daily  predniSONE   Tablet 8 milliGRAM(s) Oral daily  spironolactone 25 milliGRAM(s) Oral every 12 hours    MEDICATIONS  (PRN):  acetaminophen    Suspension .. 650 milliGRAM(s) Oral every 6 hours PRN Temp greater or equal to 38.5C (101.3F), Mild Pain (1 - 3)      Allergies    aspirin (Short breath)  Avelox (Short breath; Pruritus)  cefepime (Anaphylaxis)  codeine (Short breath)  Dilaudid (Short breath)  iodine (Short breath; Swelling)  penicillin (Anaphylaxis)  shellfish (Anaphylaxis)  tetanus toxoid (Short breath)  Valium (Short breath)    Intolerances      Review of Systems:  Constitutional:  No fever, No chills.  Eye:  No eye pain, No blurring.   Ear/Nose/Mouth/Throat:  No nasal congestion, No sore throat.   Respiratory:  + shortness of breath, + cough.   Cardiovascular:  No chest pain, No palpitations.   Gastrointestinal:  No nausea, No vomiting, No diarrhea.   Genitourinary:  No dysuria, No hematuria.   Endocrine:  No excessive thirst, No polyuria.   Musculoskeletal:  + back pain, No decreased range of motion.   Integumentary:  No rash, + skin lesion.   Neurologic:  Alert and oriented X4, No confusion, No numbness, No tingling.   Additional ROS reviewed and negative except as indicated in HPI.        PHYSICAL EXAM:  VITALS: T(C): 37.4 (09-14-22 @ 16:00)  T(F): 99.3 (09-14-22 @ 16:00), Max: 99.3 (09-13-22 @ 20:00)  HR: 80 (09-14-22 @ 16:00) (70 - 88)  BP: --  RR:  (16 - 35)  SpO2:  (96% - 100%)  Wt(kg): --  General: Well-developed female, No acute distress, Speaking full sentences.   Eye:  Extraocular movements are intact on right, left eye prosthesis.  HENT:  Normocephalic.   Neck:  Supple, Non-tender.   Respiratory:  Respirations are non-labored, Symmetric chest wall expansion, Breath sounds are equal.   Cardiovascular:  Normal rate, Regular rhythm, No edema.  Gastrointestinal:  Soft, Non-tender, Non-distended.   Musculoskeletal:  Normal range of motion, No gross joint swelling.   Feet:  Normal by visual exam, right foot with wound under bandage, Normal pulses.  Integumentary:  Dry.  Mental Status Exam:  Orientedx4, Speech clear and coherent.   Neurologic:  Alert, Orientedx4, Normal motor function, No focal deficits, Cranial Nerves II-XII are grossly intact bilaterally.   Psychiatric:  Cooperative, Appropriate mood & affect.    POCT Blood Glucose.: 176 mg/dL (09-14-22 @ 16:21)  POCT Blood Glucose.: 220 mg/dL (09-14-22 @ 15:12)  POCT Blood Glucose.: 216 mg/dL (09-14-22 @ 13:53)  POCT Blood Glucose.: 188 mg/dL (09-14-22 @ 12:57)  POCT Blood Glucose.: 92 mg/dL (09-14-22 @ 11:59)  POCT Blood Glucose.: 102 mg/dL (09-14-22 @ 10:53)  POCT Blood Glucose.: 106 mg/dL (09-14-22 @ 09:59)  POCT Blood Glucose.: 116 mg/dL (09-14-22 @ 08:43)  POCT Blood Glucose.: 112 mg/dL (09-14-22 @ 07:59)  POCT Blood Glucose.: 115 mg/dL (09-14-22 @ 06:50)  POCT Blood Glucose.: 116 mg/dL (09-14-22 @ 05:58)  POCT Blood Glucose.: 136 mg/dL (09-14-22 @ 04:58)  POCT Blood Glucose.: 125 mg/dL (09-14-22 @ 03:50)  POCT Blood Glucose.: 114 mg/dL (09-14-22 @ 03:00)  POCT Blood Glucose.: 113 mg/dL (09-14-22 @ 01:51)  POCT Blood Glucose.: 109 mg/dL (09-14-22 @ 01:04)  POCT Blood Glucose.: 110 mg/dL (09-13-22 @ 23:53)  POCT Blood Glucose.: 106 mg/dL (09-13-22 @ 23:07)  POCT Blood Glucose.: 113 mg/dL (09-13-22 @ 21:48)  POCT Blood Glucose.: 112 mg/dL (09-13-22 @ 20:54)  POCT Blood Glucose.: 116 mg/dL (09-13-22 @ 19:55)  POCT Blood Glucose.: 104 mg/dL (09-13-22 @ 18:47)  POCT Blood Glucose.: 116 mg/dL (09-13-22 @ 18:06)  POCT Blood Glucose.: 106 mg/dL (09-13-22 @ 17:05)  POCT Blood Glucose.: 161 mg/dL (09-13-22 @ 13:35)  POCT Blood Glucose.: 168 mg/dL (09-13-22 @ 10:02)  POCT Blood Glucose.: 160 mg/dL (09-13-22 @ 08:07)  POCT Blood Glucose.: 195 mg/dL (09-13-22 @ 06:45)  POCT Blood Glucose.: 174 mg/dL (09-13-22 @ 06:05)  POCT Blood Glucose.: 206 mg/dL (09-13-22 @ 04:23)  POCT Blood Glucose.: 185 mg/dL (09-13-22 @ 02:56)  POCT Blood Glucose.: 204 mg/dL (09-13-22 @ 01:53)  POCT Blood Glucose.: 209 mg/dL (09-13-22 @ 01:05)  POCT Blood Glucose.: 195 mg/dL (09-12-22 @ 22:57)  POCT Blood Glucose.: 159 mg/dL (09-12-22 @ 21:54)  POCT Blood Glucose.: 93 mg/dL (09-12-22 @ 20:44)  POCT Blood Glucose.: 103 mg/dL (09-12-22 @ 20:12)  POCT Blood Glucose.: 120 mg/dL (09-12-22 @ 19:04)  POCT Blood Glucose.: 130 mg/dL (09-12-22 @ 17:56)  POCT Blood Glucose.: 195 mg/dL (09-12-22 @ 16:53)  POCT Blood Glucose.: 166 mg/dL (09-12-22 @ 14:56)  POCT Blood Glucose.: 161 mg/dL (09-12-22 @ 14:01)  POCT Blood Glucose.: 234 mg/dL (09-12-22 @ 13:16)  POCT Blood Glucose.: 209 mg/dL (09-12-22 @ 12:00)  POCT Blood Glucose.: 166 mg/dL (09-12-22 @ 10:20)  POCT Blood Glucose.: 170 mg/dL (09-12-22 @ 09:33)  POCT Blood Glucose.: 220 mg/dL (09-12-22 @ 08:13)  POCT Blood Glucose.: 212 mg/dL (09-12-22 @ 06:56)  POCT Blood Glucose.: 225 mg/dL (09-12-22 @ 06:01)  POCT Blood Glucose.: 191 mg/dL (09-12-22 @ 04:47)  POCT Blood Glucose.: 208 mg/dL (09-12-22 @ 04:01)  POCT Blood Glucose.: 129 mg/dL (09-12-22 @ 02:57)  POCT Blood Glucose.: 87 mg/dL (09-12-22 @ 02:21)  POCT Blood Glucose.: 106 mg/dL (09-12-22 @ 01:02)  POCT Blood Glucose.: 147 mg/dL (09-11-22 @ 23:08)  POCT Blood Glucose.: 160 mg/dL (09-11-22 @ 22:09)  POCT Blood Glucose.: 155 mg/dL (09-11-22 @ 21:11)  POCT Blood Glucose.: 162 mg/dL (09-11-22 @ 19:49)  POCT Blood Glucose.: 155 mg/dL (09-11-22 @ 18:42)  POCT Blood Glucose.: 150 mg/dL (09-11-22 @ 18:10)                            9.2    23.26 )-----------( 281      ( 14 Sep 2022 01:15 )             30.0       09-14    139  |  101  |  26<H>  ----------------------------<  108<H>  4.6   |  26  |  0.53    eGFR: 98    Ca    8.6      09-14  Mg     2.4     09-14  Phos  2.7     09-14    TPro  6.2  /  Alb  3.1<L>  /  TBili  0.3  /  DBili  x   /  AST  240<H>  /  ALT  212<H>  /  AlkPhos  94  09-14      Thyroid Function Tests:  09-06 @ 16:46 TSH 3.30 FreeT4 -- T3 -- Anti TPO -- Anti Thyroglobulin Ab -- TSI --              Radiology:

## 2022-09-14 NOTE — SWALLOW BEDSIDE ASSESSMENT ADULT - ORAL PREPARATORY PHASE
slowed however efficient mastication suspected reduced bolus control resulting in premature spillover

## 2022-09-14 NOTE — SWALLOW BEDSIDE ASSESSMENT ADULT - ASR SWALLOW ASPIRATION MONITOR
Monitor for s/s aspiration/laryngeal penetration. If noted:  D/C p.o. intake, provide non-oral nutrition/hydration/meds, and contact this service @ [phone]/change of breathing pattern/cough/gurgly voice/fever/pneumonia/throat clearing/upper respiratory infection

## 2022-09-14 NOTE — PROGRESS NOTE ADULT - TIME BILLING
as above;  multifactorial dyspnea-resp failure--severe persistent asthma, TBM s/p tracheoplasty, s/p Aortic aneurysm repair, PNA (proteus)-O2 NC-keep 90%  severe persistent asthma--medrol 8mg, singulair 10, duoneb q 6, budes .5 bid, incentive spirometry, tezspire 8/29  TBM-s/p tracheoplasty--accapella, unable to use vest due to surgery  s/p Aortic aneurysm repair--as per CTS staff care  AF-on heparin--eventual eliquis rx  DVT R-IJ-on heparin rx  ID-proteus ESBL pna-meropenum as per ID-to complete 9/18  PT-OOB as able         VC dysfunction--aspiration precautions-sp and sw evaln  GOC                                    Heme onc f/up colon ca  prog--guarded in CTU    Eulalio Allison MD-Pulmonary   [phone]

## 2022-09-14 NOTE — SWALLOW BEDSIDE ASSESSMENT ADULT - PHARYNGEAL PHASE
mild latency in the swallow response, palpable hyolaryngeal elevation, and audible deglutition liklely indicative of poor coordination of respiration and deglutition. No overt s/s aspiration. mild latency in the swallow response, palpable hyolaryngeal elevation, no overt s/s aspiration

## 2022-09-14 NOTE — SWALLOW BEDSIDE ASSESSMENT ADULT - SLP PERTINENT HISTORY OF CURRENT PROBLEM
71 year-old female with a history of DM2, CAD, A-Fib on apixaban, Severe Persistent Asthma (on chronic steroids, recently started on Tezspire), colon cancer s/p resection/chemo, and tracheobronchomalacia s/p tracheoplasty, and recent OM of the R foot s/b debridement and completed course of Vancomycin/Ertapenem for MRSA/ESBL Proteus/Corynebacterium who now presents with chest pain, found to have Type A dissection s/p repair with modified Bentall procedure and hemiarch replacement on 9/6/22. Post-op course complicated by acute hypoxemic respiratory failure, shock, anemia, and hyperglycemia requiring insulin gtt. Extubated 9/13 71F PMH DM, COPD, Chronic Adrenal Insufficiency on Chronic prednisone, history of colorectal cancer s/p resection (colostomy bag), Hx of CAD, Chronic A fib on Eliquis, and tracheomalacia s/p tracheoplasty, and multiple intubations, recent dx of OM presented to ED at [facility] this morning with epigastric pain, belching and central chest pain. Found on CTA to have type A aortic dissection and transferred to [facility] for surgical evaluation by Dr. Cabrera. Pt admitted for ascending aortic dissection. Patient went to OR for emergency type A aortic dissection repair with Dr. Cabrera. Extubated post-op on 9/7. On 9/8 pt  developed increasingly labored breathing and due to mild delirium and depressed mental status post operatively, patient was reintubated. Extubated 9/13.

## 2022-09-14 NOTE — PROGRESS NOTE ADULT - ASSESSMENT
71 year-old female with a history of DM2, CAD, A-Fib on apixaban, Severe Persistent Asthma (on chronic steroids, recently started on Tezspire), colon cancer s/p resection/chemo, and tracheobronchomalacia s/p tracheoplasty, and recent OM of the R foot s/b debridement and completed course of Vancomycin/Ertapenem for MRSA/ESBL Proteus/Corynebacterium who now presents with chest pain, found to have Type A dissection s/p repair with modified Bentall procedure and hemiarch replacement on 9/6/22. Post-op course complicated by acute hypoxemic respiratory failure, shock, anemia, and hyperglycemia requiring insulin gtt. Extubated 9/13, now awake and alert with mild encephalopathy but overall significantly improved.    Assessment:  Acute hypoxemic respiratory failure requiring intubation  Type A Aortic Dissection  Severe Persistent Asthma  History of Tracheobronchomalacia    Plan:  - Currently doing well on high flow nasal cannula at 40 LPM, 40% FiO2  - If respiratory status remains stable, would taper to NC@4-6 LPM, goal SpO2>92%  - Continue prednisone at 8 mg daily (chronic dose for her severe persistent asthma)  - Albuterol and ipratropium nebs q6h  - Budesonide 0.5 mg nebs q12h  - Chest PT, incentive spirometry, out of bed to chair  - S/p Tezspire injection on 8/29  - Consider starting montelukast 10 mg at bedtime (takes Zafirlukast as outpatient)  - Remains on heparin gtt for A-Fib + RIJ thrombus  - R arm elevation and warm compresses for superficial thrombophlebitis with edema  - Remains on mexiletine and metoprolol for A-Fib  - Currently appears euvolemic, on furosemide 40 mg oral daily, strict I/O's, close monitoring of kidney function and lytes  - On meropenem for Proteus mirabilis pneumonia + ESBL Proteus mirabilis infection of right foot, f/up ID and podiatry  - Discussed with patient and daughter Zoe at bedside, full code 71 year-old female with a history of DM2, CAD, A-Fib on apixaban, Severe Persistent Asthma (on chronic steroids, recently started on Tezspire), colon cancer s/p resection/chemo, and tracheobronchomalacia s/p tracheoplasty, and recent OM of the R foot s/b debridement and completed course of Vancomycin/Ertapenem for MRSA/ESBL Proteus/Corynebacterium who now presents with chest pain, found to have Type A dissection s/p repair with modified Bentall procedure and hemiarch replacement on 9/6/22. Post-op course complicated by acute hypoxemic respiratory failure, shock, anemia, and hyperglycemia requiring insulin gtt. Extubated 9/13, now awake and alert with mild encephalopathy but overall significantly improved.    Assessment:  Acute hypoxemic respiratory failure requiring intubation  Type A Aortic Dissection  Severe Persistent Asthma  History of Tracheobronchomalacia    Plan:  - Currently doing well on NC O2-6 liters NC  - If respiratory status remains stable, would taper to NC@4-6 LPM, goal SpO2>92%  - Continue prednisone at 8 mg daily (chronic dose for her severe persistent asthma)  - Albuterol and ipratropium nebs q6h    - Budesonide 0.5 mg nebs q12h    - Chest PT, incentive spirometry, out of bed to chair  - S/p Tezspire injection on 8/29  - Re-starting montelukast 10 mg at bedtime (takes Zafirlukast as outpatient)  - Remains on heparin gtt for A-Fib + RIJ thrombus  - R arm elevation and warm compresses for superficial thrombophlebitis with edema  - Remains on mexiletine and metoprolol for A-Fib  - Currently appears euvolemic, on furosemide 40 mg oral daily, strict I/O's, close monitoring of kidney function and lytes  - On meropenem for Proteus mirabilis pneumonia + ESBL Proteus mirabilis infection of right foot, f/up ID and podiatry  - Discussed with patient and daughter Zoe at bedside, full code  **************************                                SEE Below:  9/14-improving but weakness

## 2022-09-14 NOTE — CONSULT NOTE ADULT - ASSESSMENT
71F PMH DM2, COPD, secondary Adrenal Insufficiency on Chronic prednisone, history of colorectal cancer s/p resection (colostomy bag), ? Hx of CAD, Chronic A fib on Eliquis, and tracheomalacia and multiple intubations, recent dx of OM presented to ED at [facility] with epigastric pain, belching and central chest pain. Found on CTA to have type A aortic dissection and transferred to [facility] for surgical evaluation by Dr. Cabrera. Now POD #8 after dissection repair. Endocrine consulted for assistance with transition from insulin drip to subcutaneous insulin.    Poorly controlled T2DM with hyperglycemia  A1c 9.4  -Start Lantus 35 units at bedtime. DO NOT HOLD IF NPO. Turn insulin drip off 2 hours after giving Lantus.  -Start Admelog 10 units TID pre-meal. HOLD IF NPO.  -Use low dose Admelog correction scale pre-meal  -Use low dose Admelog correction scale at bedtime  -Fingerstick BG before meals and bedtime  -Goal -180 while in ICU, 100-180 on the floors  -Carbohydrate consistent diet  -RD consult  Discharge plan:  -Likely to discharge patient home on basal/bolus insulin. Final regimen pending clinical course.  -Recommend routine outpatient ophthalmology, podiatry and endocrinology f/u. Can f/u with endocrinologist Dr. Saavedra.    HTN  -Management per primary team.    HLD  -On rosuvastatin 5mg daily    Rajat Dixon DO, Endocrinology Fellow  [phone]

## 2022-09-14 NOTE — PROGRESS NOTE ADULT - ASSESSMENT
71 f with DM, CAD, a-fib, asthma/COPD, Chronic Adrenal Insufficiency on steroids, history of colorectal cancer s/p resection and ostomy, tracheomalacia and multiple intubations, recent admission for sepsis, foot osteo and abscess s/p debridement and OR cx with MRSA, ESBL proteus and corynebacterium s/p 6 weeks of vanco and ertapenem which ended 8/17, now p/w chest pain, found to have  type A aortic dissection, s/p modified Bentall and hemiarch on 9/6/22.   Post op course complicated by shock, respiratory failure, anemia and hyperglycemia.   fever started 9/9, sputum cx with proteus mirabilis    fever post op for aortic dissection, sputum cx with proteus, pt was still febrile on zosyn but last wound cx that showed proteus was ESBL, switched to suraj and still persistently febrile and the proteus now is pan sensitive, chest /abd CT 9/12 with small fluid around the ascending aorta but no enhancing or abscess, b/l lower lobe atelectasis, ?mild colitis  doppler: thrombosed and occluded RIJ  adrenal insufficiency, was on prednisone, was given dexa 9/12 and 9/13, now no more fevers, extubated 9/13 so the persistent fevers could be in the setting of adrenal insufficiency   blood and urine cx negative, ALT, AST increased today to 200s, no bili or ALK, unlikely due to suraj  penicillin and cefepime allergy in chart but pt has received cefepime and ceftriaxone before    * was on cefepime 9/9, switched to suraj 9/10, now day 6, would complete at least a 7 day course, can switch to ceftriaxone 1 qd  * monitor WBC/diff and renal function      The above assessment and plan was discussed with CTU    Michelle Rolon MD  contact on teams  After 5pm and on weekends call [phone]

## 2022-09-14 NOTE — PROGRESS NOTE ADULT - SUBJECTIVE AND OBJECTIVE BOX
CHIEF COMPLAINT: f/up sob, chronic resp failure, TBM, severe persistent asthma, Type A aortic dissection s/p repair w/modified "Bentall procedure and hemiarch replacement 9/6-    Interval Events:    REVIEW OF SYSTEMS:  Constitutional: No fevers or chills. No weight loss. No fatigue or generalized malaise.  Eyes: No itching or discharge from the eyes  ENT: No ear pain. No ear discharge. No nasal congestion. No post nasal drip. No epistaxis. No throat pain. No sore throat. No difficulty swallowing.   CV: No chest pain. No palpitations. No lightheadedness or dizziness.   Resp: No dyspnea at rest. No dyspnea on exertion. No orthopnea. No wheezing. No cough. No stridor. No sputum production. No chest pain with respiration.  GI: No nausea. No vomiting. No diarrhea.  MSK: No joint pain or pain in any extremities  Integumentary: No skin lesions. No pedal edema.  Neurological: No gross motor weakness. No sensory changes.  [ ] All other systems negative  [ ] Unable to assess ROS because ________    OBJECTIVE:  ICU Vital Signs Last 24 Hrs  T(C): 37.1 (14 Sep 2022 04:00), Max: 37.4 (13 Sep 2022 12:00)  T(F): 98.8 (14 Sep 2022 04:00), Max: 99.3 (13 Sep 2022 12:00)  HR: 81 (14 Sep 2022 04:00) (70 - 93)  BP: --  BP(mean): --  ABP: 131/56 (14 Sep 2022 04:00) (121/50 - 154/62)  ABP(mean): 83 (14 Sep 2022 04:00) (74 - 94)  RR: 21 (14 Sep 2022 05:00) (16 - 35)  SpO2: 100% (14 Sep 2022 05:00) (99% - 100%)    O2 Parameters below as of 14 Sep 2022 05:00  Patient On (Oxygen Delivery Method): nasal cannula  O2 Flow (L/min): 6        Mode: CPAP with PS, FiO2: 40, PEEP: 5, PS: 5, MAP: 7, PIP: 15    09-12 @ 07:01  -  09-13 @ 07:00  --------------------------------------------------------  IN: 2377.7 mL / OUT: 2775 mL / NET: -397.3 mL    09-13 @ 07:01  - 09-14 @ 05:52  --------------------------------------------------------  IN: 1600 mL / OUT: 2780 mL / NET: -1180 mL      CAPILLARY BLOOD GLUCOSE  104 (13 Sep 2022 19:00)      POCT Blood Glucose.: 136 mg/dL (14 Sep 2022 04:58)      PHYSICAL EXAM:  General: Awake, alert, oriented X 3.   HEENT: Atraumatic, normocephalic.                 Mallampatti Grade                 No nasal congestion.                No tonsillar or pharyngeal exudates.  Lymph Nodes: No palpable lymphadenopathy  Neck: No JVD. No carotid bruit.   Respiratory: Normal chest expansion                         Normal percussion                         Normal and equal air entry                         No wheeze, rhonchi or rales.  Cardiovascular: S1 S2 normal. No murmurs, rubs or gallops.   Abdomen: Soft, non-tender, non-distended. No organomegaly. Normoactive bowel sounds.  Extremities: Warm to touch. Peripheral pulse palpable. No pedal edema.   Skin: No rashes or skin lesions  Neurological: Motor and sensory examination equal and normal in all four extremities.  Psychiatry: Appropriate mood and affect.    HOSPITAL MEDICATIONS:  MEDICATIONS  (STANDING):  albuterol/ipratropium for Nebulization 3 milliLiter(s) Nebulizer every 6 hours  buDESOnide    Inhalation Suspension 0.5 milliGRAM(s) Inhalation every 12 hours  chlorhexidine 0.12% Liquid 5 milliLiter(s) Oral Mucosa two times a day  chlorhexidine 2% Cloths 1 Application(s) Topical <User Schedule>  collagenase Ointment 1 Application(s) Topical daily  furosemide    Tablet 40 milliGRAM(s) Oral daily  heparin  Infusion 900 Unit(s)/Hr (12.5 mL/Hr) IV Continuous <Continuous>  insulin regular Infusion 3 Unit(s)/Hr (3 mL/Hr) IV Continuous <Continuous>  meropenem  IVPB 1000 milliGRAM(s) IV Intermittent every 8 hours  metoprolol tartrate 25 milliGRAM(s) Oral two times a day  mexiletine 200 milliGRAM(s) Oral every 8 hours  niCARdipine Infusion 1 mG/Hr (5 mL/Hr) IV Continuous <Continuous>  nystatin    Suspension 187684 Unit(s) Oral every 6 hours  pantoprazole  Injectable 40 milliGRAM(s) IV Push daily  predniSONE   Tablet 8 milliGRAM(s) Oral daily  spironolactone 25 milliGRAM(s) Oral every 12 hours    MEDICATIONS  (PRN):  acetaminophen    Suspension .. 650 milliGRAM(s) Oral every 6 hours PRN Temp greater or equal to 38.5C (101.3F), Mild Pain (1 - 3)      LABS:                        9.2    23.26 )-----------( 281      ( 14 Sep 2022 01:15 )             30.0     09-14    139  |  101  |  26<H>  ----------------------------<  108<H>  4.6   |  26  |  0.53    Ca    8.6      14 Sep 2022 01:15  Phos  2.7     09-14  Mg     2.4     09-14    TPro  6.2  /  Alb  3.1<L>  /  TBili  0.3  /  DBili  x   /  AST  240<H>  /  ALT  212<H>  /  AlkPhos  94  09-14    PTT - ( 14 Sep 2022 01:15 )  PTT:42.5 sec    Arterial Blood Gas:  09-14 @ 03:05  7.49/38/155/29/98.1/5.3  ABG lactate: --  Arterial Blood Gas:  09-14 @ 01:07  7.51/37/159/30/96.8/6.1  ABG lactate: --  Arterial Blood Gas:  09-13 @ 18:07  7.48/40/186/30/97.8/5.8  ABG lactate: --  Arterial Blood Gas:  09-13 @ 13:35  7.53/35/188/29/97.5/6.2  ABG lactate: --  Arterial Blood Gas:  09-13 @ 12:16  7.55/34/178/30/98.1/7.0  ABG lactate: --  Arterial Blood Gas:  09-13 @ 10:04  7.51/37/151/30/97.3/6.1  ABG lactate: --  Arterial Blood Gas:  09-13 @ 00:15  7.50/38/163/30/98.7/6.0  ABG lactate: --        MICROBIOLOGY:     RADIOLOGY:  [ ] Reviewed and interpreted by me    Point of Care Ultrasound Findings:    PFT:    EKG: CHIEF COMPLAINT: f/up sob, chronic resp failure, TBM, severe persistent asthma, VC dysfunction, Type A aortic dissection s/p repair w/modified "Bentall procedure and hemiarch replacement 9/6-better-some sob and cough, weakness    Interval Events: NC O2    REVIEW OF SYSTEMS:  Constitutional: No fevers or chills. No weight loss. + fatigue or generalized malaise.  Eyes: No itching or discharge from the eyes  ENT: No ear pain. No ear discharge. No nasal congestion. No post nasal drip. No epistaxis. No throat pain. No sore throat. No difficulty swallowing.   CV: No chest pain. No palpitations. No lightheadedness or dizziness.   Resp: No dyspnea at rest. +dyspnea on exertion. No orthopnea. No wheezing. + cough. No stridor. No sputum production. No chest pain with respiration.  GI: No nausea. No vomiting. No diarrhea.  MSK: No joint pain or pain in any extremities  Integumentary: No skin lesions. No pedal edema.  Neurological: + gross motor weakness. No sensory changes.  [+ ] All other systems negative  [ ] Unable to assess ROS because ________    OBJECTIVE:  ICU Vital Signs Last 24 Hrs  T(C): 37.1 (14 Sep 2022 04:00), Max: 37.4 (13 Sep 2022 12:00)  T(F): 98.8 (14 Sep 2022 04:00), Max: 99.3 (13 Sep 2022 12:00)  HR: 81 (14 Sep 2022 04:00) (70 - 93)  BP: --  BP(mean): --  ABP: 131/56 (14 Sep 2022 04:00) (121/50 - 154/62)  ABP(mean): 83 (14 Sep 2022 04:00) (74 - 94)  RR: 21 (14 Sep 2022 05:00) (16 - 35)  SpO2: 100% (14 Sep 2022 05:00) (99% - 100%)    O2 Parameters below as of 14 Sep 2022 05:00  Patient On (Oxygen Delivery Method): nasal cannula  O2 Flow (L/min): 6        Mode: CPAP with PS, FiO2: 40, PEEP: 5, PS: 5, MAP: 7, PIP: 15    09-12 @ 07:01  -  09-13 @ 07:00  --------------------------------------------------------  IN: 2377.7 mL / OUT: 2775 mL / NET: -397.3 mL    09-13 @ 07:01  -  09-14 @ 05:52  --------------------------------------------------------  IN: 1600 mL / OUT: 2780 mL / NET: -1180 mL      CAPILLARY BLOOD GLUCOSE  104 (13 Sep 2022 19:00)      POCT Blood Glucose.: 136 mg/dL (14 Sep 2022 04:58)      PHYSICAL EXAM: NAD in bed on NC  General: Awake, alert, oriented X 3.   HEENT: Atraumatic, normocephalic.                 Mallampatti Grade 2                No nasal congestion.                No tonsillar or pharyngeal exudates.  Lymph Nodes: No palpable lymphadenopathy  Neck: No JVD. No carotid bruit.   Respiratory: abnormal chest expansion                         Normal percussion                         Normal and equal air entry                         mild exp wheeze,no rhonchi or rales.  Cardiovascular: S1 S2 normal. No murmurs, rubs or gallops.   Abdomen: Soft, non-tender, non-distended. No organomegaly. Normoactive bowel sounds.  Extremities: Warm to touch. Peripheral pulse palpable. No pedal edema.   Skin: No rashes or skin lesions  Neurological: Motor and sensory examination equal and abnormal in all four extremities.  Psychiatry: Appropriate mood and affect.    HOSPITAL MEDICATIONS:  MEDICATIONS  (STANDING):  albuterol/ipratropium for Nebulization 3 milliLiter(s) Nebulizer every 6 hours  buDESOnide    Inhalation Suspension 0.5 milliGRAM(s) Inhalation every 12 hours  chlorhexidine 0.12% Liquid 5 milliLiter(s) Oral Mucosa two times a day  chlorhexidine 2% Cloths 1 Application(s) Topical <User Schedule>  collagenase Ointment 1 Application(s) Topical daily  furosemide    Tablet 40 milliGRAM(s) Oral daily  heparin  Infusion 900 Unit(s)/Hr (12.5 mL/Hr) IV Continuous <Continuous>  insulin regular Infusion 3 Unit(s)/Hr (3 mL/Hr) IV Continuous <Continuous>  meropenem  IVPB 1000 milliGRAM(s) IV Intermittent every 8 hours  metoprolol tartrate 25 milliGRAM(s) Oral two times a day  mexiletine 200 milliGRAM(s) Oral every 8 hours  niCARdipine Infusion 1 mG/Hr (5 mL/Hr) IV Continuous <Continuous>  nystatin    Suspension [id number] Unit(s) Oral every 6 hours  pantoprazole  Injectable 40 milliGRAM(s) IV Push daily  predniSONE   Tablet 8 milliGRAM(s) Oral daily  spironolactone 25 milliGRAM(s) Oral every 12 hours    MEDICATIONS  (PRN):  acetaminophen    Suspension .. 650 milliGRAM(s) Oral every 6 hours PRN Temp greater or equal to 38.5C (101.3F), Mild Pain (1 - 3)      LABS:                        9.2    23.26 )-----------( 281      ( 14 Sep 2022 01:15 )             30.0     09-14    139  |  101  |  26<H>  ----------------------------<  108<H>  4.6   |  26  |  0.53    Ca    8.6      14 Sep 2022 01:15  Phos  2.7     09-14  Mg     2.4     09-14    TPro  6.2  /  Alb  3.1<L>  /  TBili  0.3  /  DBili  x   /  AST  240<H>  /  ALT  212<H>  /  AlkPhos  94  09-14    PTT - ( 14 Sep 2022 01:15 )  PTT:42.5 sec    Arterial Blood Gas:  09-14 @ 03:05  7.49/38/155/29/98.1/5.3  ABG lactate: --  Arterial Blood Gas:  09-14 @ 01:07  7.51/37/159/30/96.8/6.1  ABG lactate: --  Arterial Blood Gas:  09-13 @ 18:07  7.48/40/186/30/97.8/5.8  ABG lactate: --  Arterial Blood Gas:  09-13 @ 13:35  7.53/35/188/29/97.5/6.2  ABG lactate: --  Arterial Blood Gas:  09-13 @ 12:16  7.55/34/178/30/98.1/7.0  ABG lactate: --  Arterial Blood Gas:  09-13 @ 10:04  7.51/37/151/30/97.3/6.1  ABG lactate: --  Arterial Blood Gas:  09-13 @ 00:15  7.50/38/163/30/98.7/6.0  ABG lactate: --        MICROBIOLOGY:     RADIOLOGY:  [ ] Reviewed and interpreted by me    Point of Care Ultrasound Findings:    PFT:    EKG:

## 2022-09-14 NOTE — SWALLOW BEDSIDE ASSESSMENT ADULT - SLP GENERAL OBSERVATIONS
Patient encountered upright in regan chair, on 2LNC, + NGT, awake/alert, Aox4. Vocal quality slightly hypophonic. Patient able to follow multistep commands and answer open ended questions.

## 2022-09-15 ENCOUNTER — APPOINTMENT (OUTPATIENT)
Dept: WOUND CARE | Facility: CLINIC | Age: 72
End: 2022-09-15

## 2022-09-15 DIAGNOSIS — E11.9 TYPE 2 DIABETES MELLITUS WITHOUT COMPLICATIONS: ICD-10-CM

## 2022-09-15 DIAGNOSIS — E27.40 UNSPECIFIED ADRENOCORTICAL INSUFFICIENCY: ICD-10-CM

## 2022-09-15 LAB
ALBUMIN SERPL ELPH-MCNC: 3.2 G/DL — LOW (ref 3.3–5)
ALP SERPL-CCNC: 119 U/L — SIGNIFICANT CHANGE UP (ref 40–120)
ALT FLD-CCNC: 405 U/L — HIGH (ref 10–45)
ANION GAP SERPL CALC-SCNC: 11 MMOL/L — SIGNIFICANT CHANGE UP (ref 5–17)
APTT BLD: 28 SEC — SIGNIFICANT CHANGE UP (ref 27.5–35.5)
APTT BLD: 75.3 SEC — HIGH (ref 27.5–35.5)
AST SERPL-CCNC: 159 U/L — HIGH (ref 10–40)
BILIRUB SERPL-MCNC: 0.4 MG/DL — SIGNIFICANT CHANGE UP (ref 0.2–1.2)
BUN SERPL-MCNC: 26 MG/DL — HIGH (ref 7–23)
CALCIUM SERPL-MCNC: 8.6 MG/DL — SIGNIFICANT CHANGE UP (ref 8.4–10.5)
CHLORIDE SERPL-SCNC: 99 MMOL/L — SIGNIFICANT CHANGE UP (ref 96–108)
CO2 SERPL-SCNC: 27 MMOL/L — SIGNIFICANT CHANGE UP (ref 22–31)
CREAT SERPL-MCNC: 0.6 MG/DL — SIGNIFICANT CHANGE UP (ref 0.5–1.3)
CULTURE RESULTS: SIGNIFICANT CHANGE UP
CULTURE RESULTS: SIGNIFICANT CHANGE UP
EGFR: 95 ML/MIN/1.73M2 — SIGNIFICANT CHANGE UP
GAS PNL BLDA: SIGNIFICANT CHANGE UP
GLUCOSE BLDC GLUCOMTR-MCNC: 103 MG/DL — HIGH (ref 70–99)
GLUCOSE BLDC GLUCOMTR-MCNC: 123 MG/DL — HIGH (ref 70–99)
GLUCOSE BLDC GLUCOMTR-MCNC: 129 MG/DL — HIGH (ref 70–99)
GLUCOSE BLDC GLUCOMTR-MCNC: 134 MG/DL — HIGH (ref 70–99)
GLUCOSE BLDC GLUCOMTR-MCNC: 137 MG/DL — HIGH (ref 70–99)
GLUCOSE BLDC GLUCOMTR-MCNC: 138 MG/DL — HIGH (ref 70–99)
GLUCOSE BLDC GLUCOMTR-MCNC: 145 MG/DL — HIGH (ref 70–99)
GLUCOSE BLDC GLUCOMTR-MCNC: 151 MG/DL — HIGH (ref 70–99)
GLUCOSE BLDC GLUCOMTR-MCNC: 155 MG/DL — HIGH (ref 70–99)
GLUCOSE BLDC GLUCOMTR-MCNC: 156 MG/DL — HIGH (ref 70–99)
GLUCOSE BLDC GLUCOMTR-MCNC: 157 MG/DL — HIGH (ref 70–99)
GLUCOSE BLDC GLUCOMTR-MCNC: 171 MG/DL — HIGH (ref 70–99)
GLUCOSE BLDC GLUCOMTR-MCNC: 173 MG/DL — HIGH (ref 70–99)
GLUCOSE BLDC GLUCOMTR-MCNC: 185 MG/DL — HIGH (ref 70–99)
GLUCOSE BLDC GLUCOMTR-MCNC: 186 MG/DL — HIGH (ref 70–99)
GLUCOSE BLDC GLUCOMTR-MCNC: 190 MG/DL — HIGH (ref 70–99)
GLUCOSE BLDC GLUCOMTR-MCNC: 296 MG/DL — HIGH (ref 70–99)
GLUCOSE BLDC GLUCOMTR-MCNC: 38 MG/DL — CRITICAL LOW (ref 70–99)
GLUCOSE BLDC GLUCOMTR-MCNC: 40 MG/DL — CRITICAL LOW (ref 70–99)
GLUCOSE BLDC GLUCOMTR-MCNC: 95 MG/DL — SIGNIFICANT CHANGE UP (ref 70–99)
GLUCOSE SERPL-MCNC: 157 MG/DL — HIGH (ref 70–99)
HCT VFR BLD CALC: 31.3 % — LOW (ref 34.5–45)
HGB BLD-MCNC: 9.5 G/DL — LOW (ref 11.5–15.5)
MAGNESIUM SERPL-MCNC: 2.3 MG/DL — SIGNIFICANT CHANGE UP (ref 1.6–2.6)
MCHC RBC-ENTMCNC: 23.6 PG — LOW (ref 27–34)
MCHC RBC-ENTMCNC: 30.4 GM/DL — LOW (ref 32–36)
MCV RBC AUTO: 77.9 FL — LOW (ref 80–100)
NRBC # BLD: 2 /100 WBCS — HIGH (ref 0–0)
PHOSPHATE SERPL-MCNC: 3 MG/DL — SIGNIFICANT CHANGE UP (ref 2.5–4.5)
PLATELET # BLD AUTO: 282 K/UL — SIGNIFICANT CHANGE UP (ref 150–400)
POTASSIUM SERPL-MCNC: 4.6 MMOL/L — SIGNIFICANT CHANGE UP (ref 3.5–5.3)
POTASSIUM SERPL-SCNC: 4.6 MMOL/L — SIGNIFICANT CHANGE UP (ref 3.5–5.3)
PROT SERPL-MCNC: 6 G/DL — SIGNIFICANT CHANGE UP (ref 6–8.3)
RBC # BLD: 4.02 M/UL — SIGNIFICANT CHANGE UP (ref 3.8–5.2)
RBC # FLD: SIGNIFICANT CHANGE UP (ref 10.3–14.5)
SODIUM SERPL-SCNC: 137 MMOL/L — SIGNIFICANT CHANGE UP (ref 135–145)
SPECIMEN SOURCE: SIGNIFICANT CHANGE UP
SPECIMEN SOURCE: SIGNIFICANT CHANGE UP
WBC # BLD: 20.8 K/UL — HIGH (ref 3.8–10.5)
WBC # FLD AUTO: 20.8 K/UL — HIGH (ref 3.8–10.5)

## 2022-09-15 PROCEDURE — 99232 SBSQ HOSP IP/OBS MODERATE 35: CPT

## 2022-09-15 PROCEDURE — 99291 CRITICAL CARE FIRST HOUR: CPT | Mod: 24

## 2022-09-15 PROCEDURE — 71045 X-RAY EXAM CHEST 1 VIEW: CPT | Mod: 26

## 2022-09-15 RX ORDER — APIXABAN 2.5 MG/1
5 TABLET, FILM COATED ORAL EVERY 12 HOURS
Refills: 0 | Status: DISCONTINUED | OUTPATIENT
Start: 2022-09-15 | End: 2022-09-27

## 2022-09-15 RX ORDER — DEXMEDETOMIDINE HYDROCHLORIDE IN 0.9% SODIUM CHLORIDE 4 UG/ML
0.5 INJECTION INTRAVENOUS
Qty: 200 | Refills: 0 | Status: DISCONTINUED | OUTPATIENT
Start: 2022-09-15 | End: 2022-09-15

## 2022-09-15 RX ORDER — FUROSEMIDE 40 MG
20 TABLET ORAL DAILY
Refills: 0 | Status: DISCONTINUED | OUTPATIENT
Start: 2022-09-16 | End: 2022-09-21

## 2022-09-15 RX ORDER — HALOPERIDOL DECANOATE 100 MG/ML
1 INJECTION INTRAMUSCULAR ONCE
Refills: 0 | Status: COMPLETED | OUTPATIENT
Start: 2022-09-15 | End: 2022-09-15

## 2022-09-15 RX ORDER — METOPROLOL TARTRATE 50 MG
50 TABLET ORAL EVERY 8 HOURS
Refills: 0 | Status: DISCONTINUED | OUTPATIENT
Start: 2022-09-15 | End: 2022-09-16

## 2022-09-15 RX ORDER — QUETIAPINE FUMARATE 200 MG/1
25 TABLET, FILM COATED ORAL AT BEDTIME
Refills: 0 | Status: DISCONTINUED | OUTPATIENT
Start: 2022-09-15 | End: 2022-09-27

## 2022-09-15 RX ORDER — MONTELUKAST 4 MG/1
10 TABLET, CHEWABLE ORAL AT BEDTIME
Refills: 0 | Status: DISCONTINUED | OUTPATIENT
Start: 2022-09-15 | End: 2022-10-01

## 2022-09-15 RX ORDER — DEXTROSE 50 % IN WATER 50 %
50 SYRINGE (ML) INTRAVENOUS ONCE
Refills: 0 | Status: COMPLETED | OUTPATIENT
Start: 2022-09-15 | End: 2022-09-15

## 2022-09-15 RX ORDER — METOPROLOL TARTRATE 50 MG
5 TABLET ORAL ONCE
Refills: 0 | Status: COMPLETED | OUTPATIENT
Start: 2022-09-15 | End: 2022-09-15

## 2022-09-15 RX ADMIN — Medication 500000 UNIT(S): at 05:02

## 2022-09-15 RX ADMIN — QUETIAPINE FUMARATE 25 MILLIGRAM(S): 200 TABLET, FILM COATED ORAL at 22:52

## 2022-09-15 RX ADMIN — SPIRONOLACTONE 25 MILLIGRAM(S): 25 TABLET, FILM COATED ORAL at 05:02

## 2022-09-15 RX ADMIN — PANTOPRAZOLE SODIUM 40 MILLIGRAM(S): 20 TABLET, DELAYED RELEASE ORAL at 11:21

## 2022-09-15 RX ADMIN — Medication 25 MILLIGRAM(S): at 05:02

## 2022-09-15 RX ADMIN — LISINOPRIL 2.5 MILLIGRAM(S): 2.5 TABLET ORAL at 05:02

## 2022-09-15 RX ADMIN — Medication 0.5 MILLIGRAM(S): at 05:54

## 2022-09-15 RX ADMIN — MEXILETINE HYDROCHLORIDE 200 MILLIGRAM(S): 150 CAPSULE ORAL at 14:00

## 2022-09-15 RX ADMIN — MEXILETINE HYDROCHLORIDE 200 MILLIGRAM(S): 150 CAPSULE ORAL at 21:55

## 2022-09-15 RX ADMIN — Medication 5 MILLIGRAM(S): at 11:20

## 2022-09-15 RX ADMIN — INSULIN HUMAN 3 UNIT(S)/HR: 100 INJECTION, SOLUTION SUBCUTANEOUS at 22:10

## 2022-09-15 RX ADMIN — CEFTRIAXONE 100 MILLIGRAM(S): 500 INJECTION, POWDER, FOR SOLUTION INTRAMUSCULAR; INTRAVENOUS at 17:21

## 2022-09-15 RX ADMIN — Medication 500000 UNIT(S): at 00:06

## 2022-09-15 RX ADMIN — Medication 50 MILLIGRAM(S): at 21:55

## 2022-09-15 RX ADMIN — Medication 1 TABLET(S): at 11:48

## 2022-09-15 RX ADMIN — MONTELUKAST 10 MILLIGRAM(S): 4 TABLET, CHEWABLE ORAL at 21:55

## 2022-09-15 RX ADMIN — Medication 8 MILLIGRAM(S): at 05:02

## 2022-09-15 RX ADMIN — MEXILETINE HYDROCHLORIDE 200 MILLIGRAM(S): 150 CAPSULE ORAL at 05:02

## 2022-09-15 RX ADMIN — Medication 1 APPLICATION(S): at 11:21

## 2022-09-15 RX ADMIN — CHLORHEXIDINE GLUCONATE 5 MILLILITER(S): 213 SOLUTION TOPICAL at 05:01

## 2022-09-15 RX ADMIN — Medication 50 MILLILITER(S): at 15:23

## 2022-09-15 RX ADMIN — Medication 500000 UNIT(S): at 17:21

## 2022-09-15 RX ADMIN — CHLORHEXIDINE GLUCONATE 5 MILLILITER(S): 213 SOLUTION TOPICAL at 17:21

## 2022-09-15 RX ADMIN — Medication 50 MILLIGRAM(S): at 11:21

## 2022-09-15 RX ADMIN — Medication 3 MILLILITER(S): at 17:29

## 2022-09-15 RX ADMIN — Medication 0.5 MILLIGRAM(S): at 17:29

## 2022-09-15 RX ADMIN — Medication 40 MILLIGRAM(S): at 05:02

## 2022-09-15 RX ADMIN — SPIRONOLACTONE 25 MILLIGRAM(S): 25 TABLET, FILM COATED ORAL at 17:21

## 2022-09-15 RX ADMIN — APIXABAN 5 MILLIGRAM(S): 2.5 TABLET, FILM COATED ORAL at 22:52

## 2022-09-15 RX ADMIN — Medication 3 MILLILITER(S): at 05:54

## 2022-09-15 RX ADMIN — CHLORHEXIDINE GLUCONATE 1 APPLICATION(S): 213 SOLUTION TOPICAL at 05:02

## 2022-09-15 RX ADMIN — HALOPERIDOL DECANOATE 1 MILLIGRAM(S): 100 INJECTION INTRAMUSCULAR at 11:47

## 2022-09-15 RX ADMIN — Medication 500000 UNIT(S): at 11:21

## 2022-09-15 RX ADMIN — Medication 3 MILLILITER(S): at 12:08

## 2022-09-15 NOTE — PROGRESS NOTE ADULT - SUBJECTIVE AND OBJECTIVE BOX
Patient seen and examined at the bedside.    Remained critically ill on continuous ICU monitoring.    OBJECTIVE:  Vital Signs Last 24 Hrs  T(C): 36.8 (15 Sep 2022 08:00), Max: 37.4 (14 Sep 2022 16:00)  T(F): 98.2 (15 Sep 2022 08:00), Max: 99.3 (14 Sep 2022 16:00)  HR: 87 (15 Sep 2022 12:11) (72 - 88)  BP: --  BP(mean): --  RR: 27 (15 Sep 2022 09:00) (18 - 35)  SpO2: 99% (15 Sep 2022 12:11) (93% - 100%)    Parameters below as of 15 Sep 2022 12:11  Patient On (Oxygen Delivery Method): room air          Physical Exam:   General: obese, elderly female breathing in sync with the ventilator  Neurology: sedated, but opens eyes, not following commands  Eyes: right pupil reactive to light, left prosthetic eye  ENT/Neck: Neck supple, trachea midline, No JVD, +ETT midline   Respiratory: Clear bilaterally   CV: S1S2, no murmurs        [x] Sternal dressing        [x] Sinus rhythm,  [x] Temporary pacing, - VVI 50  Abdominal: Soft, NT, ND, colostomy in place  Extremities: 1+ pedal edema noted, 2+ UE edema,+ peripheral pulses   Skin: Dry dressing over right heel, no Rashes, Hematoma, Ecchymosis                             Assessment:  71F PMH DM, COPD, Chronic Adrenal Insufficiency on Chronic prednisone, history of colorectal cancer s/p resection (colostomy bag), Hx of CAD, Chronic A fib on Eliquis, and tracheomalacia and multiple intubations, recent dx of OM presented to ED at [facility] this morning with epigastric pain, belching and central chest pain. Found on CTA to have type A aortic dissection and transferred to [facility] for surgical evaluation by Dr. Cabrera.     Ascending aortic dissection s/p modified bentall and hemiarch on 9/6   Hypovolemic shock   Post op respiratory insufficiency  Acute blood loss anemia  Stress hyperglycemia   RIJ thrombus  Pancreatic cyst    Proteus PNA        Plan:   ***Neuro***  [x] Nonfocal   Post operative neuro assessment       ***Cardiovascular***  CT Chest on 9/12:  Status post recent ascending aortic dissection repair. Small amount of fluid surrounding the ascending aorta without evidence of enhancing wall to suggest abscess.  RUE duplex on 9/12: There is a thrombosed and occluded RIJ   Invasive hemodynamic monitoring, assess perfusion indices   SR / CVP 13/ MAP 73/Hct 31.3/ Lactate .9  Blood pressure management with Lopressor, also started home Lisinopril   [x] Temporary pacing - VVI paced at 50 stand by mode  Continuos reassessment of hemodynamics   Mexiletine for hx of afib   [x] AC therapy with heparin gtt, PTT goal 50-60    Serial EKG and cardiac enzymes       ***Pulmonary***  CT Chest on 9/12: Bilateral lower lobe atelectasis with bilateral pleural effusions   Reintubated for change in mental status on 9/8, self extubated on 9/9 and reintubated again, extubated to HFO2 on 9/13, now sating on room air  Encourage incentive spirometry, continue pulse ox monitoring, follow ABGs   Hx of COPD / Continue bronchodilators and Prednisone   Monitor secretions and suction PRN                    ***GI***  CT A/P on 9/12: Mild thickening of the cecum and ascending colon possibly representing mild nonspecific proximal colitis. Hepatic cirrhosis. The focal IPMN of the pancreas with large cyst within the tail the pancreas measuring 3.1 cm.  Colostomy bag in place, continue to monitor output   [x]  Diet: Tolerating PO consistent carb diet.   [x] Protonix       ***Renal***  Continue to monitor I/Os, BUN/Creatinine.   Replete lytes PRN  Amezcua present     Diuresis with Aldactone       ***ID***  SCx on 9/8 +Proteus mirabilis   UA on 9/9+ LE, WBC 60, few yeast  /  UCx on 9/10 NG   Rocephin for Empiric Dosing  BCx on 9/10 NGTD, BCx on 9/12 NGTD   Nystatin for thrush       ***Endocrine***  [x]  DM : HbA1c 9.4%                - [x] Insulin gtt                - Need tight glycemic control to prevent wound infection.          Patient requires continuous monitoring with bedside rhythm monitoring, pulse oximetry monitoring, and continuous central venous and arterial pressure monitoring; and intermittent blood gas analysis. Care plan discussed with the ICU care team.   Patient remained critical, at risk for life threatening decompensation.    I have spent 35 minutes providing critical care management to this patient.    By signing my name below, I, Kieran Plascencia, attest that this documentation has been prepared under the direction and in the presence of KIANA Issa   Electronically signed: Georgi Cordero, 09-15-22 @ 12:27    I, KIANA Issa, personally performed the services described in this documentation. all medical record entries made by the scribe were at my direction and in my presence. I have reviewed the chart and agree that the record reflects my personal performance and is accurate and complete  Electronically signed: KIANA Issa  Patient seen and examined at the bedside.    Remained critically ill on continuous ICU monitoring.    OBJECTIVE:  Vital Signs Last 24 Hrs  T(C): 36.8 (15 Sep 2022 08:00), Max: 37.4 (14 Sep 2022 16:00)  T(F): 98.2 (15 Sep 2022 08:00), Max: 99.3 (14 Sep 2022 16:00)  HR: 87 (15 Sep 2022 12:11) (72 - 88)  BP: 130/57  BP(mean): --  RR: 27 (15 Sep 2022 09:00) (18 - 35)  SpO2: 99% (15 Sep 2022 12:11) (93% - 100%)    Parameters below as of 15 Sep 2022 12:11  Patient On (Oxygen Delivery Method): room air          Physical Exam:   General: obese, elderly female, OOBTC, NAD  Neurology: sedated, but opens eyes, not following commands  Eyes: right pupil reactive to light, left prosthetic eye  ENT/Neck: Neck supple, trachea midline, No JVD, +ETT midline   Respiratory: Clear bilaterally   CV: S1S2, no murmurs        [x] Sternal dressing        [x] Sinus rhythm,  [x] Temporary pacing, - VVI 50  Abdominal: Soft, NT, ND, colostomy in place  Extremities: 1+ pedal edema noted, + peripheral pulses   Skin: Dry dressing over right heel, no Rashes, Hematoma, Ecchymosis                             Assessment:  71F PMH DM, COPD, Chronic Adrenal Insufficiency on Chronic prednisone, history of colorectal cancer s/p resection (colostomy bag), Hx of CAD, Chronic A fib on Eliquis, and tracheomalacia and multiple intubations, recent dx of OM presented to ED at [facility] this morning with epigastric pain, belching and central chest pain. Found on CTA to have type A aortic dissection and transferred to [facility] for surgical evaluation by Dr. Cabrera.     Ascending aortic dissection s/p modified bentall and hemiarch on 9/6   Hypovolemic shock   Post op respiratory insufficiency  Acute blood loss anemia  Stress hyperglycemia   RIJ thrombus  Pancreatic cyst    Proteus PNA        Plan:   ***Neuro***  [x] Nonfocal   Post operative neuro assessment       ***Cardiovascular***  CT Chest on 9/12:  Status post recent ascending aortic dissection repair. Small amount of fluid surrounding the ascending aorta without evidence of enhancing wall to suggest abscess.  RUE duplex on 9/12: There is a thrombosed and occluded RIJ   Invasive hemodynamic monitoring, assess perfusion indices   SR / CVP 13/ MAP 73/Hct 31.3/ Lactate .9  Blood pressure management with Lopressor, also started home Lisinopril   [x] Temporary pacing - VVI paced at 50 stand by mode  Continuos reassessment of hemodynamics   Mexiletine for hx of afib   [x] AC therapy with heparin gtt, PTT goal 50-60    Serial EKG and cardiac enzymes   Will hold heparin and dc PW today      ***Pulmonary***  CT Chest on 9/12: Bilateral lower lobe atelectasis with bilateral pleural effusions   Reintubated for change in mental status on 9/8, self extubated on 9/9 and reintubated again, extubated to HFO2 on 9/13, now sating on room air  Encourage incentive spirometry, continue pulse ox monitoring, follow ABGs   Hx of COPD / Continue bronchodilators and Prednisone   Monitor secretions and suction PRN                    ***GI***  CT A/P on 9/12: Mild thickening of the cecum and ascending colon possibly representing mild nonspecific proximal colitis. Hepatic cirrhosis. The focal IPMN of the pancreas with large cyst within the tail the pancreas measuring 3.1 cm.  Colostomy bag in place, continue to monitor output   [x]  Diet: Tolerating PO consistent carb diet per swallow, however eating less than 50% of tray due to mood  [x] Protonix       ***Renal***  Continue to monitor I/Os, BUN/Creatinine.   Replete lytes PRN  Amezcua present     DC-ing diuretics       ***ID***  SCx on 9/8 +Proteus mirabilis   UA on 9/9+ LE, WBC 60, few yeast  /  UCx on 9/10 NG   Rocephin for Empiric Dosing  BCx on 9/10 NGTD, BCx on 9/12 NGTD   Nystatin for thrush   CTX      ***Endocrine***  [x]  DM : HbA1c 9.4%                - [x] Insulin gtt                - Need tight glycemic control to prevent wound infection.          Patient requires continuous monitoring with bedside rhythm monitoring, pulse oximetry monitoring, and continuous central venous and arterial pressure monitoring; and intermittent blood gas analysis. Care plan discussed with the ICU care team.   Patient remained critical, at risk for life threatening decompensation.    I have spent 35 minutes providing critical care management to this patient.    By signing my name below, I, Kieran Plascencia, attest that this documentation has been prepared under the direction and in the presence of KIANA Issa   Electronically signed: Georgi Cordero, 09-15-22 @ 12:27    I, KIANA Issa, personally performed the services described in this documentation. all medical record entries made by the scribe were at my direction and in my presence. I have reviewed the chart and agree that the record reflects my personal performance and is accurate and complete  Electronically signed: KIANA Issa

## 2022-09-15 NOTE — PROGRESS NOTE ADULT - ASSESSMENT
71 year-old female with a history of DM2, CAD, A-Fib on apixaban, Severe Persistent Asthma (on chronic steroids, recently started on Tezspire), colon cancer s/p resection/chemo, and tracheobronchomalacia s/p tracheoplasty, and recent OM of the R foot s/b debridement and completed course of Vancomycin/Ertapenem for MRSA/ESBL Proteus/Corynebacterium who now presents with chest pain, found to have Type A dissection s/p repair with modified Bentall procedure and hemiarch replacement on 9/6/22. Post-op course complicated by acute hypoxemic respiratory failure, shock, anemia, and hyperglycemia requiring insulin gtt. Extubated 9/13, now awake and alert with mild encephalopathy but overall significantly improved.    Assessment:  Acute hypoxemic respiratory failure requiring intubation  Type A Aortic Dissection  Severe Persistent Asthma  History of Tracheobronchomalacia    Plan:  - Currently doing well on NC O2-6 liters NC  - If respiratory status remains stable, would taper to NC@4-6 LPM, goal SpO2>92%  - Continue prednisone at 8 mg daily (chronic dose for her severe persistent asthma)  - Albuterol and ipratropium nebs q6h    - Budesonide 0.5 mg nebs q12h    - Chest PT, incentive spirometry, out of bed to chair  - S/p Tezspire injection on 8/29  - Re-starting montelukast 10 mg at bedtime (takes Zafirlukast as outpatient)  - Remains on heparin gtt for A-Fib + RIJ thrombus  - R arm elevation and warm compresses for superficial thrombophlebitis with edema  - Remains on mexiletine and metoprolol for A-Fib  - Currently appears euvolemic, on furosemide 40 mg oral daily, strict I/O's, close monitoring of kidney function and lytes  - On meropenem for Proteus mirabilis pneumonia + ESBL Proteus mirabilis infection of right foot, f/up ID and podiatry  - Discussed with patient and daughter Zoe at bedside, full code  **************************                                SEE Below:  9/14-improving but weakness  9/15-ABX adjusted to ceftriaxone (LFTS)-PICU

## 2022-09-15 NOTE — CHART NOTE - NSCHARTNOTEFT_GEN_A_CORE
Nutrition Follow Up Note  Patient seen for: consult for calorie count.    Chart reviewed, events noted. Pt is a 71F PMH DM, COPD, Chronic Adrenal Insufficiency on Chronic prednisone, history of colorectal cancer s/p resection (colostomy bag), Hx of CAD, Chronic A fib on Eliquis, and tracheomalacia and multiple intubations, recent dx of OM presented to ED at [facility] this morning with epigastric pain, belching and central chest pain. Found on CTA to have type A aortic dissection s/p modified bentall and hemiarch on .    Source: [x] Patient       [x] EMR        [x] RN        [] Family at bedside       [x] Other: team    -If unable to interview patient: [] Trach/Vent/BiPAP  [] Disoriented/confused/inappropriate to interview    Diet Order: Diet, Soft and Bite Sized:   Mildly Thick Liquids (MILDTHICKLIQS)  Supplement Feeding Modality:  Oral  Glucerna Shake Cans or Servings Per Day:  3       Frequency:  Daily (09-15-22 @ 09:13) [Active]    Nutrition-related concerns:   - Pt extubated , passed SLP evaluation on  recommended for soft and bite sized and mildly thickened liquids. Remains with NGT. Ordered for calorie count - sheet provided to RN this AM.   - Pt states she has good appetite however only consumed milk and applesauce this AM. States she disliked the smell of eggs. Discussed importance of adequate protein energy, reviewed sources of protein. Pt amenable to Glucerna shakes as she consumes them at home.   - HbA1c 9.4%. Insulin gtt for BG management. Pt also receiving Prednisone.    GI: +colostomy, 100mL output noted .  Bowel Regimen? [] Yes   [x] No   - Antibiotic regimen noted.    Weights:   Daily Weight in k.3 (09-15), Weight in k.4 (), Weight in k.7 (), Weight in k.7 (), Weight in k.7 (09-10), Weight in k.8 (), Weight in k.2 (), Weight in k.9 ()   - Weight fluctuations noted, likely 2/2 fluid shifts - pt on Lasix + Spironolactone, will continue to monitor    - Pt reports UBW 137lbs or 62.3kg. Denies any recent wt changes PTA, endorses weight gain since admission 2/2 fluid.    MEDICATIONS  (STANDING):  albuterol/ipratropium for Nebulization 3 milliLiter(s) Nebulizer every 6 hours  buDESOnide    Inhalation Suspension 0.5 milliGRAM(s) Inhalation every 12 hours  cefTRIAXone   IVPB 1000 milliGRAM(s) IV Intermittent every 24 hours  chlorhexidine 0.12% Liquid 5 milliLiter(s) Oral Mucosa two times a day  chlorhexidine 2% Cloths 1 Application(s) Topical <User Schedule>  collagenase Ointment 1 Application(s) Topical daily  furosemide    Tablet 40 milliGRAM(s) Oral daily  heparin  Infusion 900 Unit(s)/Hr (13 mL/Hr) IV Continuous <Continuous>  insulin regular Infusion 3 Unit(s)/Hr (3 mL/Hr) IV Continuous <Continuous>  lisinopril 2.5 milliGRAM(s) Oral daily  metoprolol tartrate 25 milliGRAM(s) Oral two times a day  mexiletine 200 milliGRAM(s) Oral every 8 hours  nystatin    Suspension 557991 Unit(s) Oral every 6 hours  pantoprazole  Injectable 40 milliGRAM(s) IV Push daily  predniSONE   Tablet 8 milliGRAM(s) Oral daily  spironolactone 25 milliGRAM(s) Oral every 12 hours    Pertinent Labs: 09-15 @ 01:21: Na 137, BUN 26<H>, Cr 0.60, <H>, K+ 4.6, Phos 3.0, Mg 2.3, Alk Phos 119, ALT/SGPT 405<H>, AST/SGOT 159<H>, HbA1c --    A1C with Estimated Average Glucose Result: 9.4 % (22 @ 16:46)  A1C with Estimated Average Glucose Result: 9.2 % (22 @ 07:36)  A1C with Estimated Average Glucose Result: 8.0 % (05-10-22 @ 12:26)  A1C with Estimated Average Glucose Result: 9.5 % (22 @ 13:50)    Finger Sticks:   POCT Blood Glucose.: 185 mg/dL (15 Sep 2022 09:48)  POCT Blood Glucose.: 156 mg/dL (15 Sep 2022 08:47)  POCT Blood Glucose.: 129 mg/dL (15 Sep 2022 07:52)  POCT Blood Glucose.: 171 mg/dL (15 Sep 2022 06:52)  POCT Blood Glucose.: 151 mg/dL (15 Sep 2022 06:03)  POCT Blood Glucose.: 95 mg/dL (15 Sep 2022 04:47)  POCT Blood Glucose.: 103 mg/dL (15 Sep 2022 03:55)  POCT Blood Glucose.: 134 mg/dL (15 Sep 2022 02:45)  POCT Blood Glucose.: 138 mg/dL (15 Sep 2022 02:08)  POCT Blood Glucose.: 173 mg/dL (15 Sep 2022 01:17)  POCT Blood Glucose.: 139 mg/dL (14 Sep 2022 23:14)  POCT Blood Glucose.: 104 mg/dL (14 Sep 2022 22:00)  POCT Blood Glucose.: 91 mg/dL (14 Sep 2022 21:09)  POCT Blood Glucose.: 110 mg/dL (14 Sep 2022 20:10)  POCT Blood Glucose.: 132 mg/dL (14 Sep 2022 19:01)  POCT Blood Glucose.: 150 mg/dL (14 Sep 2022 17:44)  POCT Blood Glucose.: 206 mg/dL (14 Sep 2022 17:07)  POCT Blood Glucose.: 176 mg/dL (14 Sep 2022 16:21)  POCT Blood Glucose.: 220 mg/dL (14 Sep 2022 15:12)  POCT Blood Glucose.: 216 mg/dL (14 Sep 2022 13:53)  POCT Blood Glucose.: 188 mg/dL (14 Sep 2022 12:57)  POCT Blood Glucose.: 92 mg/dL (14 Sep 2022 11:59)  POCT Blood Glucose.: 102 mg/dL (14 Sep 2022 10:53)    Skin per nursing documentation: No noted pressure injuries as per documentation.   Edema: 1+ generalized, 2+ bilateral arm    based on dosing wt 67.7kg with consideration for extubation  Estimated Energy Needs: 1897 - 2234kcal (28 - 33kcal/kg)  Estimated Protein Needs: 95 - 122g (1.4 - 1.8g/kg)  Estimated Fluid Needs: per team.    Previous Nutrition Diagnosis: Inadequate Protein Energy Intake  Nutrition Diagnosis is: [x] ongoing  [] resolved [] not applicable     Previous Nutrition Diagnosis: Increased Nutrient Needs  Nutrition Diagnosis is: [x] ongoing  [] resolved [] not applicable     New Nutrition Diagnosis: [x] Not applicable    Nutrition Care Plan:  [x] In Progress - being addressed with PO encouragement, oral nutrition supplements  [] Achieved  [] Not applicable    Nutrition Interventions:     Education Provided:       [x] Yes: Provided recommendations to optimize PO and protein intake, recommended small frequent meals by ordering nutrient-dense snacks and leaving non-perishable food away from tray for later consumption during the day or between meals, to start with protein, and sips of supplement throughout the day; reviewed foods with protein and menu order procedures in hospital.     Recommendations:  1. Continue diet textures/consistencies per team. Recommend add consistent carbohydrate restriction.  2. Glucerna Shake 240mls 3x daily (660kcals, 30g protein) - discussed with provider.  3. Consider multivitamin supplementation unless otherwise contraindicated.    4. Provide encouragement with PO intake, menu selections, and assistance with meals as needed.   5. Monitor colostomy output, consider bowel regimen PRN.     *** Calorie count started 9/15, RD to assess results upon completion on . If PO intake poor recommend initiating nocturnal feeds of Glucerna 1.5 at 65mL/hr x 10hr to provide 650mL total volume, 975kcal, 54g protein, 493mL free water. Regimen meets 51% lower end estimated energy needs, 57% lower end estimated protein needs.    Monitoring and Evaluation:   Continue to monitor nutritional intake, tolerance to diet prescription, weights, labs, skin integrity    RD remains available upon request and will follow up per protocol    Deedee Rosas MS, RD, CDN, [facility] Pager #[phone]

## 2022-09-15 NOTE — PROGRESS NOTE ADULT - ASSESSMENT
71F PMH DM2, COPD, secondary Adrenal Insufficiency on Chronic steroids, history of colorectal cancer s/p resection (colostomy bag), ? Hx of CAD, Chronic A fib on Eliquis, and tracheomalacia and multiple intubations, recent dx of OM presented to ED at [facility] with epigastric pain, belching and central chest pain. Found on CTA to have type A aortic dissection and transferred to [facility] for surgical evaluation by Dr. Cabrera. Now POD #9 after dissection repair. Endocrine consulted for assistance with transition from insulin drip to subcutaneous insulin.    Poorly controlled T2DM with hyperglycemia  home medications: Lantus 35 units sq qhs, novolog TID (based on carb count usually around 10 units)  A1c 9.4  while inpt:   remains on insulin gtt per team as pt w/ poor po intake undergoing calorie count at this time, may be restarted on TF if poor PO intake persists  -Goal -180 while in ICU, 100-180 on the floors  -FS q1h while on insulin gtt     if tolerating PO diet, based on todays gtt calculations:   -FS TID AC qhs  -Start Lantus 30 units at bedtime. DO NOT HOLD IF NPO. Turn insulin drip off 2 hours after giving Lantus.  -Start Admelog 10 units TID pre-meal. HOLD IF NPO. can reduce by 50% x1 if tolerating half of meal   -Use low dose Admelog correction scale pre-meal  -Use low dose Admelog correction scale at bedtime  -Carbohydrate consistent diet  -RD consult    if restarted on tube feeding AND at goal rate:   -consider starting NPH 15 units q 6 hours hold if TF off and start Admelog moderate correctional scale q6h   FS q6h     Discharge plan:  -Likely to discharge patient home on basal/bolus insulin. Final regimen pending clinical course.  -Recommend routine outpatient ophthalmology, podiatry and endocrinology f/u. Can f/u with endocrinologist Dr. Saavedra.    HTN  -Management per primary team.    HLD  -On rosuvastatin 5mg daily    Adrenal insufficiency 2/2 chronic steroid use  on chronic steroids at home: medrol 8mg qd   while admitted on prednisone 8mg po qd  do not discontinue steroids abruptly , if pt made npo or refuses medications will need IV steroid dose       Discussed with patient and primary  team Mackenzie BRUNO and RN   Contact via [employer] Teams during business hours  On evenings and weekends, please call [phone] or page endocrine fellow on call.   Email: [email or web address]   Please note that this patient may be followed by different provider tomorrow.    greater than 50% of the encounter was spent counseling and/or coordination of care.  28 minutes spent on total encounter; The necessity of the time spent during the encounter on this date of service was due to development of plan of care/coordination of care/glycemic control through review of labs, blood glucose values and vital signs.  71F PMH DM2, COPD, secondary Adrenal Insufficiency on Chronic steroids, history of colorectal cancer s/p resection (colostomy bag), ? Hx of CAD, Chronic A fib on Eliquis, and tracheomalacia and multiple intubations, recent dx of OM presented to ED at [facility] with epigastric pain, belching and central chest pain. Found on CTA to have type A aortic dissection and transferred to [facility] for surgical evaluation by Dr. Cabrera. Now POD #9 after dissection repair. Endocrine consulted for assistance with transition from insulin drip to subcutaneous insulin.    Poorly controlled T2DM with hyperglycemia  home medications: Lantus 35 units sq qhs, novolog TID (based on carb count usually around 10 units)  A1c 9.4  while inpt:   remains on insulin gtt per team as pt w/ poor po intake undergoing calorie count at this time, may be restarted on TF if poor PO intake persists  -Goal -180 while in ICU, 100-180 on the floors  -FS q1h while on insulin gtt     if tolerating PO diet, based on todays gtt calculations:   -FS TID AC qhs  -Start Lantus 30 units at bedtime. DO NOT HOLD IF NPO. Turn insulin drip off 2 hours after giving Lantus.  -Start Admelog 10 units TID pre-meal. HOLD IF NPO. can reduce by 50% x1 if tolerating half of meal   -Use low dose Admelog correction scale pre-meal  -Use low dose Admelog correction scale at bedtime  -Carbohydrate consistent diet  -RD consult    if restarted on tube feeding AND at goal rate:   -discontinue insulin gtt 1 hour after first NPH dose given  -consider starting NPH 15 units q 6 hours hold if TF off and start Admelog moderate correctional scale q6h   FS q6h     Discharge plan:  -Likely to discharge patient home on basal/bolus insulin. Final regimen pending clinical course.  -Recommend routine outpatient ophthalmology, podiatry and endocrinology f/u. Can f/u with endocrinologist Dr. Saavedra.    HTN  -Management per primary team.    HLD  -On rosuvastatin 5mg daily    Adrenal insufficiency 2/2 chronic steroid use  on chronic steroids at home: medrol 8mg qd   while admitted on prednisone 8mg po qd  do not discontinue steroids abruptly , if pt made npo or refuses medications will need IV steroid dose       Discussed with patient and primary  team Mackenzie BRUNO and RN   Contact via [employer] Teams during business hours  On evenings and weekends, please call [phone] or page endocrine fellow on call.   Email: [email or web address]   Please note that this patient may be followed by different provider tomorrow.    greater than 50% of the encounter was spent counseling and/or coordination of care.  28 minutes spent on total encounter; The necessity of the time spent during the encounter on this date of service was due to development of plan of care/coordination of care/glycemic control through review of labs, blood glucose values and vital signs.  71F PMH DM2, COPD, secondary Adrenal Insufficiency on Chronic steroids, history of colorectal cancer s/p resection (colostomy bag), ? Hx of CAD, Chronic A fib on Eliquis, and tracheomalacia and multiple intubations, recent dx of OM presented to ED at [facility] with epigastric pain, belching and central chest pain. Found on CTA to have type A aortic dissection and transferred to [facility] for surgical evaluation by Dr. Cabrera. Now POD #9 after dissection repair. Endocrine consulted for assistance with transition from insulin drip to subcutaneous insulin.    Poorly controlled T2DM with hyperglycemia  home medications: Lantus 35 units sq qhs, novolog TID (based on carb count usually around 10 units)  A1c 9.4  while inpt:   remains on insulin gtt per team as pt w/ poor po intake undergoing calorie count at this time, may be restarted on TF if poor PO intake persists  -Goal -180 while in ICU, 100-180 on the floors  -FS q1h while on insulin gtt     if tolerating PO diet, based on todays gtt calculations:   -FS TID AC qhs  -Start Lantus 30 units at bedtime. DO NOT HOLD IF NPO. Turn insulin drip off 2 hours after giving Lantus.  -Start Admelog 10 units TID pre-meal. HOLD IF NPO. can reduce by 50% x1 if tolerating half of meal   -Use low dose Admelog correction scale pre-meal  -Use low dose Admelog correction scale at bedtime  -Carbohydrate consistent diet  -RD consult    if restarted on tube feeding AND at goal rate:   -discontinue insulin gtt 1 hour after first NPH dose given  -consider starting NPH 15 units q 6 hours hold if TF off and start Admelog moderate correctional scale q6h   FS q6h     Discharge plan:  -Likely to discharge patient home on basal/bolus insulin. Final regimen pending clinical course.  -Recommend routine outpatient ophthalmology, podiatry and endocrinology f/u. Can f/u with endocrinologist Dr. Saavedra.    HTN  -Management per primary team.    HLD  -On rosuvastatin 5mg daily    Adrenal insufficiency 2/2 chronic steroid use  on chronic steroids at home: medrol 8mg qd   while admitted on prednisone 8mg po qd  do not discontinue steroids abruptly , if pt made npo or refuses prednisone will need IV steroid dose  if need to transition to IV would order methylprednisone 6 mg IV qd if stable d/w Dr Stevenson       Discussed with patient and primary  team Mackenzie BRUNO and RN   Contact via [employer] Teams during business hours  On evenings and weekends, please call [phone] or page endocrine fellow on call.   Email: [email or web address]   Please note that this patient may be followed by different provider tomorrow.    greater than 50% of the encounter was spent counseling and/or coordination of care.  28 minutes spent on total encounter; The necessity of the time spent during the encounter on this date of service was due to development of plan of care/coordination of care/glycemic control through review of labs, blood glucose values and vital signs.

## 2022-09-15 NOTE — PROVIDER CONTACT NOTE (HYPOGLYCEMIA EVENT) - NS PROVIDER CONTACT BACKGROUND-HYPO
Age: 71y    Gender: Female    POCT Blood Glucose:  296 mg/dL (09-15-22 @ 15:29)  40 mg/dL (09-15-22 @ 15:22)  38 mg/dL (09-15-22 @ 15:20)  157 mg/dL (09-15-22 @ 13:12)  145 mg/dL (09-15-22 @ 11:07)      eMAR:  predniSONE   Tablet   8 milliGRAM(s) Oral (09-15-22 @ 05:02)

## 2022-09-15 NOTE — PROGRESS NOTE ADULT - SUBJECTIVE AND OBJECTIVE BOX
seen earlier today     Chief Complaint: Type 2 Diabetes Mellitus     INTERVAL HX:  remains on insulin gtt today, team and RN at bedside per staff pt ate <50% of breakfast, trialing PO intake before starting basal bolus, ngt still present but tf off for now. pt stating " just leave me alone I dont want to talk to you" appears irritable but answered ROS     Review of Systems:  General: As above  Cardiovascular: No chest pain  Respiratory: No SOB  GI: No nausea, vomiting  Endocrine: no  S&Sx of hypoglycemia    Allergies    aspirin (Short breath)  Avelox (Short breath; Pruritus)  cefepime (Anaphylaxis)  codeine (Short breath)  Dilaudid (Short breath)  iodine (Short breath; Swelling)  penicillin (Anaphylaxis)  shellfish (Anaphylaxis)  tetanus toxoid (Short breath)  Valium (Short breath)    Intolerances      MEDICATIONS  (STANDING):  albuterol/ipratropium for Nebulization 3 milliLiter(s) Nebulizer every 6 hours  buDESOnide    Inhalation Suspension 0.5 milliGRAM(s) Inhalation every 12 hours  cefTRIAXone   IVPB 1000 milliGRAM(s) IV Intermittent every 24 hours  chlorhexidine 0.12% Liquid 5 milliLiter(s) Oral Mucosa two times a day  chlorhexidine 2% Cloths 1 Application(s) Topical <User Schedule>  collagenase Ointment 1 Application(s) Topical daily  dexMEDEtomidine Infusion 0.5 MICROgram(s)/kG/Hr (8.46 mL/Hr) IV Continuous <Continuous>  heparin  Infusion 900 Unit(s)/Hr (13 mL/Hr) IV Continuous <Continuous>  insulin regular Infusion 3 Unit(s)/Hr (3 mL/Hr) IV Continuous <Continuous>  lisinopril 2.5 milliGRAM(s) Oral daily  metoprolol tartrate 50 milliGRAM(s) Oral every 8 hours  mexiletine 200 milliGRAM(s) Oral every 8 hours  multivitamin 1 Tablet(s) Oral daily  nystatin    Suspension 188049 Unit(s) Oral every 6 hours  pantoprazole  Injectable 40 milliGRAM(s) IV Push daily  predniSONE   Tablet 8 milliGRAM(s) Oral daily  spironolactone 25 milliGRAM(s) Oral every 12 hours        predniSONE   Tablet   8 milliGRAM(s) Oral (09-15-22 @ 05:02)        PHYSICAL EXAM:  VITALS: T(C): 36.8 (09-15-22 @ 08:00)  T(F): 98.2 (09-15-22 @ 08:00), Max: 99.3 (09-14-22 @ 16:00)  HR: 87 (09-15-22 @ 12:11) (72 - 88)  BP: --  RR:  (18 - 35)  SpO2:  (93% - 100%)  Wt(kg): --  GENERAL: female sitting in chair, msi cdi  Respiratory: Respirations unlabored   Extremities: no edema  NEURO: Alert , irritable      LABS:    POCT Blood Glucose.: 157 mg/dL (09-15-22 @ 13:12)  POCT Blood Glucose.: 145 mg/dL (09-15-22 @ 11:07)  POCT Blood Glucose.: 185 mg/dL (09-15-22 @ 09:48)  POCT Blood Glucose.: 156 mg/dL (09-15-22 @ 08:47)  POCT Blood Glucose.: 129 mg/dL (09-15-22 @ 07:52)  POCT Blood Glucose.: 171 mg/dL (09-15-22 @ 06:52)  POCT Blood Glucose.: 151 mg/dL (09-15-22 @ 06:03)  POCT Blood Glucose.: 95 mg/dL (09-15-22 @ 04:47)  POCT Blood Glucose.: 103 mg/dL (09-15-22 @ 03:55)  POCT Blood Glucose.: 134 mg/dL (09-15-22 @ 02:45)  POCT Blood Glucose.: 138 mg/dL (09-15-22 @ 02:08)  POCT Blood Glucose.: 173 mg/dL (09-15-22 @ 01:17)  POCT Blood Glucose.: 139 mg/dL (09-14-22 @ 23:14)  POCT Blood Glucose.: 104 mg/dL (09-14-22 @ 22:00)  POCT Blood Glucose.: 91 mg/dL (09-14-22 @ 21:09)  POCT Blood Glucose.: 110 mg/dL (09-14-22 @ 20:10)  POCT Blood Glucose.: 132 mg/dL (09-14-22 @ 19:01)  POCT Blood Glucose.: 150 mg/dL (09-14-22 @ 17:44)  POCT Blood Glucose.: 206 mg/dL (09-14-22 @ 17:07)  POCT Blood Glucose.: 176 mg/dL (09-14-22 @ 16:21)  POCT Blood Glucose.: 220 mg/dL (09-14-22 @ 15:12)  POCT Blood Glucose.: 216 mg/dL (09-14-22 @ 13:53)  POCT Blood Glucose.: 188 mg/dL (09-14-22 @ 12:57)  POCT Blood Glucose.: 92 mg/dL (09-14-22 @ 11:59)  POCT Blood Glucose.: 102 mg/dL (09-14-22 @ 10:53)  POCT Blood Glucose.: 106 mg/dL (09-14-22 @ 09:59)  POCT Blood Glucose.: 116 mg/dL (09-14-22 @ 08:43)  POCT Blood Glucose.: 112 mg/dL (09-14-22 @ 07:59)  POCT Blood Glucose.: 115 mg/dL (09-14-22 @ 06:50)  POCT Blood Glucose.: 116 mg/dL (09-14-22 @ 05:58)  POCT Blood Glucose.: 136 mg/dL (09-14-22 @ 04:58)  POCT Blood Glucose.: 125 mg/dL (09-14-22 @ 03:50)  POCT Blood Glucose.: 114 mg/dL (09-14-22 @ 03:00)  POCT Blood Glucose.: 113 mg/dL (09-14-22 @ 01:51)  POCT Blood Glucose.: 109 mg/dL (09-14-22 @ 01:04)  POCT Blood Glucose.: 110 mg/dL (09-13-22 @ 23:53)  POCT Blood Glucose.: 106 mg/dL (09-13-22 @ 23:07)  POCT Blood Glucose.: 113 mg/dL (09-13-22 @ 21:48)  POCT Blood Glucose.: 112 mg/dL (09-13-22 @ 20:54)  POCT Blood Glucose.: 116 mg/dL (09-13-22 @ 19:55)  POCT Blood Glucose.: 104 mg/dL (09-13-22 @ 18:47)  POCT Blood Glucose.: 116 mg/dL (09-13-22 @ 18:06)  POCT Blood Glucose.: 106 mg/dL (09-13-22 @ 17:05)  POCT Blood Glucose.: 161 mg/dL (09-13-22 @ 13:35)  POCT Blood Glucose.: 168 mg/dL (09-13-22 @ 10:02)  POCT Blood Glucose.: 160 mg/dL (09-13-22 @ 08:07)  POCT Blood Glucose.: 195 mg/dL (09-13-22 @ 06:45)  POCT Blood Glucose.: 174 mg/dL (09-13-22 @ 06:05)  POCT Blood Glucose.: 206 mg/dL (09-13-22 @ 04:23)  POCT Blood Glucose.: 185 mg/dL (09-13-22 @ 02:56)  POCT Blood Glucose.: 204 mg/dL (09-13-22 @ 01:53)  POCT Blood Glucose.: 209 mg/dL (09-13-22 @ 01:05)  POCT Blood Glucose.: 195 mg/dL (09-12-22 @ 22:57)  POCT Blood Glucose.: 159 mg/dL (09-12-22 @ 21:54)  POCT Blood Glucose.: 93 mg/dL (09-12-22 @ 20:44)  POCT Blood Glucose.: 103 mg/dL (09-12-22 @ 20:12)  POCT Blood Glucose.: 120 mg/dL (09-12-22 @ 19:04)  POCT Blood Glucose.: 130 mg/dL (09-12-22 @ 17:56)  POCT Blood Glucose.: 195 mg/dL (09-12-22 @ 16:53)  POCT Blood Glucose.: 166 mg/dL (09-12-22 @ 14:56)                            9.5    20.80 )-----------( 282      ( 15 Sep 2022 01:21 )             31.3       09-15    137  |  99  |  26<H>  ----------------------------<  157<H>  4.6   |  27  |  0.60    Ca    8.6      15 Sep 2022 01:21  Phos  3.0     09-15  Mg     2.3     09-15    TPro  6.0  /  Alb  3.2<L>  /  TBili  0.4  /  DBili  x   /  AST  159<H>  /  ALT  405<H>  /  AlkPhos  119  09-15      eGFR: 95 mL/min/1.73m2 (15 Sep 2022 01:21)          Thyroid Function Tests:  09-06 @ 16:46 TSH 3.30 FreeT4 -- T3 -- Anti TPO -- Anti Thyroglobulin Ab -- TSI --          A1C with Estimated Average Glucose Result: 9.4 % (09-06-22 @ 16:46)  A1C with Estimated Average Glucose Result: 9.2 % (07-11-22 @ 07:36)      Estimated Average Glucose: 223 mg/dL (09-06-22 @ 16:46)  Estimated Average Glucose: 217 mg/dL (07-11-22 @ 07:36)                          seen earlier today     Chief Complaint: Type 2 Diabetes Mellitus     INTERVAL HX:  remains on insulin gtt today, team and RN at bedside per staff pt ate <50% of breakfast, trialing PO intake before starting basal bolus, ngt still present but tf off for now. pt stating " just leave me alone I dont want to talk to you" appears irritable but answered ROS     Review of Systems:  General: As above  Cardiovascular: No chest pain  Respiratory: No SOB  GI: No nausea, vomiting  Endocrine: no  S&Sx of hypoglycemia    Allergies    aspirin (Short breath)  Avelox (Short breath; Pruritus)  cefepime (Anaphylaxis)  codeine (Short breath)  Dilaudid (Short breath)  iodine (Short breath; Swelling)  penicillin (Anaphylaxis)  shellfish (Anaphylaxis)  tetanus toxoid (Short breath)  Valium (Short breath)    Intolerances      MEDICATIONS  (STANDING):  albuterol/ipratropium for Nebulization 3 milliLiter(s) Nebulizer every 6 hours  buDESOnide    Inhalation Suspension 0.5 milliGRAM(s) Inhalation every 12 hours  cefTRIAXone   IVPB 1000 milliGRAM(s) IV Intermittent every 24 hours  chlorhexidine 0.12% Liquid 5 milliLiter(s) Oral Mucosa two times a day  chlorhexidine 2% Cloths 1 Application(s) Topical <User Schedule>  collagenase Ointment 1 Application(s) Topical daily  dexMEDEtomidine Infusion 0.5 MICROgram(s)/kG/Hr (8.46 mL/Hr) IV Continuous <Continuous>  heparin  Infusion 900 Unit(s)/Hr (13 mL/Hr) IV Continuous <Continuous>  insulin regular Infusion 3 Unit(s)/Hr (3 mL/Hr) IV Continuous <Continuous>  lisinopril 2.5 milliGRAM(s) Oral daily  metoprolol tartrate 50 milliGRAM(s) Oral every 8 hours  mexiletine 200 milliGRAM(s) Oral every 8 hours  multivitamin 1 Tablet(s) Oral daily  nystatin    Suspension 391190 Unit(s) Oral every 6 hours  pantoprazole  Injectable 40 milliGRAM(s) IV Push daily  predniSONE   Tablet 8 milliGRAM(s) Oral daily  spironolactone 25 milliGRAM(s) Oral every 12 hours        predniSONE   Tablet   8 milliGRAM(s) Oral (09-15-22 @ 05:02)        PHYSICAL EXAM:  VITALS: T(C): 36.8 (09-15-22 @ 08:00)  T(F): 98.2 (09-15-22 @ 08:00), Max: 99.3 (09-14-22 @ 16:00)  HR: 87 (09-15-22 @ 12:11) (72 - 88)  BP: --  RR:  (18 - 35)  SpO2:  (93% - 100%)  Wt(kg): --  GENERAL: female sitting in chair, msi cdi  Respiratory: Respirations unlabored   Extremities: pedal edema  NEURO: Alert , irritable      LABS:    POCT Blood Glucose.: 157 mg/dL (09-15-22 @ 13:12)  POCT Blood Glucose.: 145 mg/dL (09-15-22 @ 11:07)  POCT Blood Glucose.: 185 mg/dL (09-15-22 @ 09:48)  POCT Blood Glucose.: 156 mg/dL (09-15-22 @ 08:47)  POCT Blood Glucose.: 129 mg/dL (09-15-22 @ 07:52)  POCT Blood Glucose.: 171 mg/dL (09-15-22 @ 06:52)  POCT Blood Glucose.: 151 mg/dL (09-15-22 @ 06:03)  POCT Blood Glucose.: 95 mg/dL (09-15-22 @ 04:47)  POCT Blood Glucose.: 103 mg/dL (09-15-22 @ 03:55)  POCT Blood Glucose.: 134 mg/dL (09-15-22 @ 02:45)  POCT Blood Glucose.: 138 mg/dL (09-15-22 @ 02:08)  POCT Blood Glucose.: 173 mg/dL (09-15-22 @ 01:17)  POCT Blood Glucose.: 139 mg/dL (09-14-22 @ 23:14)  POCT Blood Glucose.: 104 mg/dL (09-14-22 @ 22:00)  POCT Blood Glucose.: 91 mg/dL (09-14-22 @ 21:09)  POCT Blood Glucose.: 110 mg/dL (09-14-22 @ 20:10)  POCT Blood Glucose.: 132 mg/dL (09-14-22 @ 19:01)  POCT Blood Glucose.: 150 mg/dL (09-14-22 @ 17:44)  POCT Blood Glucose.: 206 mg/dL (09-14-22 @ 17:07)  POCT Blood Glucose.: 176 mg/dL (09-14-22 @ 16:21)  POCT Blood Glucose.: 220 mg/dL (09-14-22 @ 15:12)  POCT Blood Glucose.: 216 mg/dL (09-14-22 @ 13:53)  POCT Blood Glucose.: 188 mg/dL (09-14-22 @ 12:57)  POCT Blood Glucose.: 92 mg/dL (09-14-22 @ 11:59)  POCT Blood Glucose.: 102 mg/dL (09-14-22 @ 10:53)  POCT Blood Glucose.: 106 mg/dL (09-14-22 @ 09:59)  POCT Blood Glucose.: 116 mg/dL (09-14-22 @ 08:43)  POCT Blood Glucose.: 112 mg/dL (09-14-22 @ 07:59)  POCT Blood Glucose.: 115 mg/dL (09-14-22 @ 06:50)  POCT Blood Glucose.: 116 mg/dL (09-14-22 @ 05:58)  POCT Blood Glucose.: 136 mg/dL (09-14-22 @ 04:58)  POCT Blood Glucose.: 125 mg/dL (09-14-22 @ 03:50)  POCT Blood Glucose.: 114 mg/dL (09-14-22 @ 03:00)  POCT Blood Glucose.: 113 mg/dL (09-14-22 @ 01:51)  POCT Blood Glucose.: 109 mg/dL (09-14-22 @ 01:04)  POCT Blood Glucose.: 110 mg/dL (09-13-22 @ 23:53)  POCT Blood Glucose.: 106 mg/dL (09-13-22 @ 23:07)  POCT Blood Glucose.: 113 mg/dL (09-13-22 @ 21:48)  POCT Blood Glucose.: 112 mg/dL (09-13-22 @ 20:54)  POCT Blood Glucose.: 116 mg/dL (09-13-22 @ 19:55)  POCT Blood Glucose.: 104 mg/dL (09-13-22 @ 18:47)  POCT Blood Glucose.: 116 mg/dL (09-13-22 @ 18:06)  POCT Blood Glucose.: 106 mg/dL (09-13-22 @ 17:05)  POCT Blood Glucose.: 161 mg/dL (09-13-22 @ 13:35)  POCT Blood Glucose.: 168 mg/dL (09-13-22 @ 10:02)  POCT Blood Glucose.: 160 mg/dL (09-13-22 @ 08:07)  POCT Blood Glucose.: 195 mg/dL (09-13-22 @ 06:45)  POCT Blood Glucose.: 174 mg/dL (09-13-22 @ 06:05)  POCT Blood Glucose.: 206 mg/dL (09-13-22 @ 04:23)  POCT Blood Glucose.: 185 mg/dL (09-13-22 @ 02:56)  POCT Blood Glucose.: 204 mg/dL (09-13-22 @ 01:53)  POCT Blood Glucose.: 209 mg/dL (09-13-22 @ 01:05)  POCT Blood Glucose.: 195 mg/dL (09-12-22 @ 22:57)  POCT Blood Glucose.: 159 mg/dL (09-12-22 @ 21:54)  POCT Blood Glucose.: 93 mg/dL (09-12-22 @ 20:44)  POCT Blood Glucose.: 103 mg/dL (09-12-22 @ 20:12)  POCT Blood Glucose.: 120 mg/dL (09-12-22 @ 19:04)  POCT Blood Glucose.: 130 mg/dL (09-12-22 @ 17:56)  POCT Blood Glucose.: 195 mg/dL (09-12-22 @ 16:53)  POCT Blood Glucose.: 166 mg/dL (09-12-22 @ 14:56)                            9.5    20.80 )-----------( 282      ( 15 Sep 2022 01:21 )             31.3       09-15    137  |  99  |  26<H>  ----------------------------<  157<H>  4.6   |  27  |  0.60    Ca    8.6      15 Sep 2022 01:21  Phos  3.0     09-15  Mg     2.3     09-15    TPro  6.0  /  Alb  3.2<L>  /  TBili  0.4  /  DBili  x   /  AST  159<H>  /  ALT  405<H>  /  AlkPhos  119  09-15      eGFR: 95 mL/min/1.73m2 (15 Sep 2022 01:21)          Thyroid Function Tests:  09-06 @ 16:46 TSH 3.30 FreeT4 -- T3 -- Anti TPO -- Anti Thyroglobulin Ab -- TSI --          A1C with Estimated Average Glucose Result: 9.4 % (09-06-22 @ 16:46)  A1C with Estimated Average Glucose Result: 9.2 % (07-11-22 @ 07:36)      Estimated Average Glucose: 223 mg/dL (09-06-22 @ 16:46)  Estimated Average Glucose: 217 mg/dL (07-11-22 @ 07:36)

## 2022-09-15 NOTE — PROGRESS NOTE ADULT - TIME BILLING
as above: slow improvements/PICU  multifactorial dyspnea-resp failure--severe persistent asthma, TBM s/p tracheoplasty, s/p Aortic aneurysm repair, Bronchitis (proteus)-O2 NC-keep 90%  severe persistent asthma--medrol 8mg, singulair 10, duoneb q 6, budes .5 bid, incentive spirometry, tezspire 8/29-next 9/29  TBM-s/p tracheoplasty--accapella, unable to use vest due to surgery  s/p Aortic aneurysm repair--as per [facility] staff care  AF-on heparin--eventual eliquis rx  DVT R-IJ-on heparin rx  ID-proteus bronchitisi-meropenum changed to ceftriaxone as per ID-to complete 9/18  PT-OOB as able         VC dysfunction--aspiration precautions-sp and sw evaln  GOC                                    Heme onc f/up colon ca  prog--guarded in CTU    Eulalio Allison MD-Pulmonary   [phone]

## 2022-09-15 NOTE — PROGRESS NOTE ADULT - SUBJECTIVE AND OBJECTIVE BOX
CHIEF COMPLAINT: f/up sob, chronic resp failure, TBM, severe persistent asthma, VC dysfunction, Type A aortic dissection s/p repair w/modified "Bentall procedure and hemiarch replacement 9/6-better- and stronger-some secretions noted    Interval Events: PICU-, elev LFTS-abx adjusted to ceftriaxone    REVIEW OF SYSTEMS:  Constitutional: No fevers or chills. No weight loss. + fatigue or generalized malaise.  Eyes: No itching or discharge from the eyes  ENT: No ear pain. No ear discharge. No nasal congestion. No post nasal drip. No epistaxis. No throat pain. No sore throat. No difficulty swallowing.   CV: No chest pain. No palpitations. No lightheadedness or dizziness.   Resp: No dyspnea at rest. No dyspnea on exertion. No orthopnea. No wheezing. + cough. No stridor. No sputum production. No chest pain with respiration.  GI: No nausea. No vomiting. No diarrhea.  MSK: No joint pain or pain in any extremities  Integumentary: No skin lesions. No pedal edema.  Neurological: + gross motor weakness. No sensory changes.  [+ ] All other systems negative  [ ] Unable to assess ROS because ________    OBJECTIVE:  ICU Vital Signs Last 24 Hrs  T(C): 36.7 (15 Sep 2022 04:00), Max: 37.4 (14 Sep 2022 16:00)  T(F): 98 (15 Sep 2022 04:00), Max: 99.3 (14 Sep 2022 16:00)  HR: 84 (15 Sep 2022 04:00) (72 - 88)  BP: --  BP(mean): --  ABP: 149/63 (15 Sep 2022 04:00) (131/52 - 171/80)  ABP(mean): 92 (15 Sep 2022 04:00) (79 - 113)  RR: 20 (15 Sep 2022 04:00) (18 - 35)  SpO2: 97% (15 Sep 2022 04:00) (93% - 100%)    O2 Parameters below as of 15 Sep 2022 04:00  Patient On (Oxygen Delivery Method): room air              09-13 @ 07:01  -  09-14 @ 07:00  --------------------------------------------------------  IN: 1916.5 mL / OUT: 3130 mL / NET: -1213.5 mL    09-14 @ 07:01  -  09-15 @ 04:58  --------------------------------------------------------  IN: 974.5 mL / OUT: 3675 mL / NET: -2700.5 mL      CAPILLARY BLOOD GLUCOSE  95 (15 Sep 2022 05:00)      POCT Blood Glucose.: 95 mg/dL (15 Sep 2022 04:47)      PHYSICAL EXAM: NAD in bed on NC  General: Awake, alert, oriented X 3.   HEENT: Atraumatic, normocephalic.                 Mallampatti Grade 2                No nasal congestion.                No tonsillar or pharyngeal exudates.  Lymph Nodes: No palpable lymphadenopathy  Neck: No JVD. No carotid bruit.   Respiratory: abnormal chest expansion                         Normal percussion                         Normal and equal air entry                         + mild exp wheeze, no rhonchi or rales.  Cardiovascular: S1 S2 normal. No murmurs, rubs or gallops.   Abdomen: Soft, non-tender, non-distended. No organomegaly. Normoactive bowel sounds.  Extremities: Warm to touch. Peripheral pulse palpable. No pedal edema.   Skin: No rashes or skin lesions  Neurological: Motor and sensory examination equal and normal in all four extremities.  Psychiatry: Appropriate mood and affect.    HOSPITAL MEDICATIONS:  MEDICATIONS  (STANDING):  albuterol/ipratropium for Nebulization 3 milliLiter(s) Nebulizer every 6 hours  buDESOnide    Inhalation Suspension 0.5 milliGRAM(s) Inhalation every 12 hours  cefTRIAXone   IVPB 1000 milliGRAM(s) IV Intermittent every 24 hours  chlorhexidine 0.12% Liquid 5 milliLiter(s) Oral Mucosa two times a day  chlorhexidine 2% Cloths 1 Application(s) Topical <User Schedule>  collagenase Ointment 1 Application(s) Topical daily  furosemide    Tablet 40 milliGRAM(s) Oral daily  heparin  Infusion 900 Unit(s)/Hr (13 mL/Hr) IV Continuous <Continuous>  insulin regular Infusion 3 Unit(s)/Hr (3 mL/Hr) IV Continuous <Continuous>  lisinopril 2.5 milliGRAM(s) Oral daily  metoprolol tartrate 25 milliGRAM(s) Oral two times a day  mexiletine 200 milliGRAM(s) Oral every 8 hours  nystatin    Suspension 145609 Unit(s) Oral every 6 hours  pantoprazole  Injectable 40 milliGRAM(s) IV Push daily  predniSONE   Tablet 8 milliGRAM(s) Oral daily  spironolactone 25 milliGRAM(s) Oral every 12 hours    MEDICATIONS  (PRN):  acetaminophen    Suspension .. 650 milliGRAM(s) Oral every 6 hours PRN Temp greater or equal to 38.5C (101.3F), Mild Pain (1 - 3)      LABS:                        9.5    20.80 )-----------( 282      ( 15 Sep 2022 01:21 )             31.3     09-15    137  |  99  |  26<H>  ----------------------------<  157<H>  4.6   |  27  |  0.60    Ca    8.6      15 Sep 2022 01:21  Phos  3.0     09-15  Mg     2.3     09-15    TPro  6.0  /  Alb  3.2<L>  /  TBili  0.4  /  DBili  x   /  AST  159<H>  /  ALT  405<H>  /  AlkPhos  119  09-15    PTT - ( 15 Sep 2022 02:23 )  PTT:75.3 sec    Arterial Blood Gas:  09-15 @ 00:05  7.47/40/85/29/97.0/5.0  ABG lactate: --  Arterial Blood Gas:  09-14 @ 03:05  7.49/38/155/29/98.1/5.3  ABG lactate: --  Arterial Blood Gas:  09-14 @ 01:07  7.51/37/159/30/96.8/6.1  ABG lactate: --  Arterial Blood Gas:  09-13 @ 18:07  7.48/40/186/30/97.8/5.8  ABG lactate: --  Arterial Blood Gas:  09-13 @ 13:35  7.53/35/188/29/97.5/6.2  ABG lactate: --  Arterial Blood Gas:  09-13 @ 12:16  7.55/34/178/30/98.1/7.0  ABG lactate: --  Arterial Blood Gas:  09-13 @ 10:04  7.51/37/151/30/97.3/6.1  ABG lactate: --        MICROBIOLOGY:     RADIOLOGY:  [ ] Reviewed and interpreted by me    Point of Care Ultrasound Findings:    PFT:    EKG:

## 2022-09-16 LAB
ALBUMIN SERPL ELPH-MCNC: 3.1 G/DL — LOW (ref 3.3–5)
ALP SERPL-CCNC: 124 U/L — HIGH (ref 40–120)
ALT FLD-CCNC: 226 U/L — HIGH (ref 10–45)
ANION GAP SERPL CALC-SCNC: 9 MMOL/L — SIGNIFICANT CHANGE UP (ref 5–17)
APPEARANCE UR: ABNORMAL
APTT BLD: 28.3 SEC — SIGNIFICANT CHANGE UP (ref 27.5–35.5)
AST SERPL-CCNC: 31 U/L — SIGNIFICANT CHANGE UP (ref 10–40)
BACTERIA # UR AUTO: NEGATIVE — SIGNIFICANT CHANGE UP
BILIRUB SERPL-MCNC: 0.4 MG/DL — SIGNIFICANT CHANGE UP (ref 0.2–1.2)
BILIRUB UR-MCNC: NEGATIVE — SIGNIFICANT CHANGE UP
BUN SERPL-MCNC: 23 MG/DL — SIGNIFICANT CHANGE UP (ref 7–23)
CALCIUM SERPL-MCNC: 8.5 MG/DL — SIGNIFICANT CHANGE UP (ref 8.4–10.5)
CHLORIDE SERPL-SCNC: 101 MMOL/L — SIGNIFICANT CHANGE UP (ref 96–108)
CO2 SERPL-SCNC: 24 MMOL/L — SIGNIFICANT CHANGE UP (ref 22–31)
COLOR SPEC: YELLOW — SIGNIFICANT CHANGE UP
COMMENT - URINE: SIGNIFICANT CHANGE UP
CREAT SERPL-MCNC: 0.72 MG/DL — SIGNIFICANT CHANGE UP (ref 0.5–1.3)
DIFF PNL FLD: ABNORMAL
EGFR: 88 ML/MIN/1.73M2 — SIGNIFICANT CHANGE UP
EPI CELLS # UR: 0 /HPF — SIGNIFICANT CHANGE UP
GAS PNL BLDA: SIGNIFICANT CHANGE UP
GLUCOSE BLDC GLUCOMTR-MCNC: 100 MG/DL — HIGH (ref 70–99)
GLUCOSE BLDC GLUCOMTR-MCNC: 115 MG/DL — HIGH (ref 70–99)
GLUCOSE BLDC GLUCOMTR-MCNC: 119 MG/DL — HIGH (ref 70–99)
GLUCOSE BLDC GLUCOMTR-MCNC: 120 MG/DL — HIGH (ref 70–99)
GLUCOSE BLDC GLUCOMTR-MCNC: 136 MG/DL — HIGH (ref 70–99)
GLUCOSE BLDC GLUCOMTR-MCNC: 142 MG/DL — HIGH (ref 70–99)
GLUCOSE BLDC GLUCOMTR-MCNC: 144 MG/DL — HIGH (ref 70–99)
GLUCOSE BLDC GLUCOMTR-MCNC: 152 MG/DL — HIGH (ref 70–99)
GLUCOSE BLDC GLUCOMTR-MCNC: 156 MG/DL — HIGH (ref 70–99)
GLUCOSE BLDC GLUCOMTR-MCNC: 158 MG/DL — HIGH (ref 70–99)
GLUCOSE BLDC GLUCOMTR-MCNC: 159 MG/DL — HIGH (ref 70–99)
GLUCOSE BLDC GLUCOMTR-MCNC: 162 MG/DL — HIGH (ref 70–99)
GLUCOSE BLDC GLUCOMTR-MCNC: 166 MG/DL — HIGH (ref 70–99)
GLUCOSE BLDC GLUCOMTR-MCNC: 167 MG/DL — HIGH (ref 70–99)
GLUCOSE BLDC GLUCOMTR-MCNC: 170 MG/DL — HIGH (ref 70–99)
GLUCOSE BLDC GLUCOMTR-MCNC: 171 MG/DL — HIGH (ref 70–99)
GLUCOSE BLDC GLUCOMTR-MCNC: 174 MG/DL — HIGH (ref 70–99)
GLUCOSE BLDC GLUCOMTR-MCNC: 175 MG/DL — HIGH (ref 70–99)
GLUCOSE BLDC GLUCOMTR-MCNC: 175 MG/DL — HIGH (ref 70–99)
GLUCOSE BLDC GLUCOMTR-MCNC: 176 MG/DL — HIGH (ref 70–99)
GLUCOSE BLDC GLUCOMTR-MCNC: 181 MG/DL — HIGH (ref 70–99)
GLUCOSE BLDC GLUCOMTR-MCNC: 186 MG/DL — HIGH (ref 70–99)
GLUCOSE BLDC GLUCOMTR-MCNC: 98 MG/DL — SIGNIFICANT CHANGE UP (ref 70–99)
GLUCOSE SERPL-MCNC: 148 MG/DL — HIGH (ref 70–99)
GLUCOSE UR QL: NEGATIVE — SIGNIFICANT CHANGE UP
HCT VFR BLD CALC: 38.2 % — SIGNIFICANT CHANGE UP (ref 34.5–45)
HGB BLD-MCNC: 11.5 G/DL — SIGNIFICANT CHANGE UP (ref 11.5–15.5)
HYALINE CASTS # UR AUTO: 9 /LPF — HIGH (ref 0–2)
INR BLD: 1.44 RATIO — HIGH (ref 0.88–1.16)
KETONES UR-MCNC: ABNORMAL
LEUKOCYTE ESTERASE UR-ACNC: ABNORMAL
MAGNESIUM SERPL-MCNC: 2.1 MG/DL — SIGNIFICANT CHANGE UP (ref 1.6–2.6)
MCHC RBC-ENTMCNC: 23.4 PG — LOW (ref 27–34)
MCHC RBC-ENTMCNC: 30.1 GM/DL — LOW (ref 32–36)
MCV RBC AUTO: 77.8 FL — LOW (ref 80–100)
NITRITE UR-MCNC: NEGATIVE — SIGNIFICANT CHANGE UP
NRBC # BLD: 5 /100 WBCS — HIGH (ref 0–0)
PH UR: 7 — SIGNIFICANT CHANGE UP (ref 5–8)
PHOSPHATE SERPL-MCNC: 3 MG/DL — SIGNIFICANT CHANGE UP (ref 2.5–4.5)
PLATELET # BLD AUTO: 302 K/UL — SIGNIFICANT CHANGE UP (ref 150–400)
POTASSIUM SERPL-MCNC: 4.4 MMOL/L — SIGNIFICANT CHANGE UP (ref 3.5–5.3)
POTASSIUM SERPL-SCNC: 4.4 MMOL/L — SIGNIFICANT CHANGE UP (ref 3.5–5.3)
PROT SERPL-MCNC: 6.1 G/DL — SIGNIFICANT CHANGE UP (ref 6–8.3)
PROT UR-MCNC: ABNORMAL
PROTHROM AB SERPL-ACNC: 16.8 SEC — HIGH (ref 10.5–13.4)
RBC # BLD: 4.91 M/UL — SIGNIFICANT CHANGE UP (ref 3.8–5.2)
RBC # FLD: SIGNIFICANT CHANGE UP (ref 10.3–14.5)
RBC CASTS # UR COMP ASSIST: 8 /HPF — HIGH (ref 0–4)
SODIUM SERPL-SCNC: 134 MMOL/L — LOW (ref 135–145)
SP GR SPEC: 1.02 — SIGNIFICANT CHANGE UP (ref 1.01–1.02)
UROBILINOGEN FLD QL: NEGATIVE — SIGNIFICANT CHANGE UP
VANCOMYCIN TROUGH SERPL-MCNC: 6.7 UG/ML — LOW (ref 10–20)
WBC # BLD: 22.36 K/UL — HIGH (ref 3.8–10.5)
WBC # FLD AUTO: 22.36 K/UL — HIGH (ref 3.8–10.5)
WBC UR QL: 95 /HPF — HIGH (ref 0–5)

## 2022-09-16 PROCEDURE — 71045 X-RAY EXAM CHEST 1 VIEW: CPT | Mod: 26

## 2022-09-16 PROCEDURE — 93010 ELECTROCARDIOGRAM REPORT: CPT

## 2022-09-16 PROCEDURE — 99291 CRITICAL CARE FIRST HOUR: CPT | Mod: 24

## 2022-09-16 PROCEDURE — 99233 SBSQ HOSP IP/OBS HIGH 50: CPT

## 2022-09-16 PROCEDURE — 99232 SBSQ HOSP IP/OBS MODERATE 35: CPT

## 2022-09-16 RX ORDER — MEROPENEM 1 G/30ML
1000 INJECTION INTRAVENOUS EVERY 8 HOURS
Refills: 0 | Status: DISCONTINUED | OUTPATIENT
Start: 2022-09-16 | End: 2022-09-19

## 2022-09-16 RX ORDER — HUMAN INSULIN 100 [IU]/ML
8 INJECTION, SUSPENSION SUBCUTANEOUS EVERY 6 HOURS
Refills: 0 | Status: DISCONTINUED | OUTPATIENT
Start: 2022-09-16 | End: 2022-09-17

## 2022-09-16 RX ORDER — VANCOMYCIN HCL 1 G
1000 VIAL (EA) INTRAVENOUS EVERY 12 HOURS
Refills: 0 | Status: DISCONTINUED | OUTPATIENT
Start: 2022-09-16 | End: 2022-09-17

## 2022-09-16 RX ORDER — ALBUMIN HUMAN 25 %
250 VIAL (ML) INTRAVENOUS ONCE
Refills: 0 | Status: COMPLETED | OUTPATIENT
Start: 2022-09-16 | End: 2022-09-16

## 2022-09-16 RX ORDER — METOPROLOL TARTRATE 50 MG
25 TABLET ORAL EVERY 8 HOURS
Refills: 0 | Status: DISCONTINUED | OUTPATIENT
Start: 2022-09-16 | End: 2022-09-27

## 2022-09-16 RX ORDER — VANCOMYCIN HCL 1 G
1000 VIAL (EA) INTRAVENOUS ONCE
Refills: 0 | Status: COMPLETED | OUTPATIENT
Start: 2022-09-16 | End: 2022-09-16

## 2022-09-16 RX ORDER — FLUCONAZOLE 150 MG/1
200 TABLET ORAL EVERY 24 HOURS
Refills: 0 | Status: DISCONTINUED | OUTPATIENT
Start: 2022-09-16 | End: 2022-09-19

## 2022-09-16 RX ADMIN — Medication 500000 UNIT(S): at 18:00

## 2022-09-16 RX ADMIN — MEXILETINE HYDROCHLORIDE 200 MILLIGRAM(S): 150 CAPSULE ORAL at 05:04

## 2022-09-16 RX ADMIN — Medication 125 MILLILITER(S): at 09:23

## 2022-09-16 RX ADMIN — Medication 1 APPLICATION(S): at 06:00

## 2022-09-16 RX ADMIN — Medication 0.5 MILLIGRAM(S): at 17:40

## 2022-09-16 RX ADMIN — APIXABAN 5 MILLIGRAM(S): 2.5 TABLET, FILM COATED ORAL at 22:45

## 2022-09-16 RX ADMIN — SPIRONOLACTONE 25 MILLIGRAM(S): 25 TABLET, FILM COATED ORAL at 05:04

## 2022-09-16 RX ADMIN — SPIRONOLACTONE 25 MILLIGRAM(S): 25 TABLET, FILM COATED ORAL at 18:00

## 2022-09-16 RX ADMIN — Medication 3 MILLILITER(S): at 11:04

## 2022-09-16 RX ADMIN — Medication 500000 UNIT(S): at 00:37

## 2022-09-16 RX ADMIN — MEROPENEM 100 MILLIGRAM(S): 1 INJECTION INTRAVENOUS at 14:45

## 2022-09-16 RX ADMIN — Medication 500000 UNIT(S): at 05:04

## 2022-09-16 RX ADMIN — APIXABAN 5 MILLIGRAM(S): 2.5 TABLET, FILM COATED ORAL at 12:26

## 2022-09-16 RX ADMIN — Medication 0.5 MILLIGRAM(S): at 06:41

## 2022-09-16 RX ADMIN — PANTOPRAZOLE SODIUM 40 MILLIGRAM(S): 20 TABLET, DELAYED RELEASE ORAL at 12:27

## 2022-09-16 RX ADMIN — Medication 25 MILLIGRAM(S): at 22:45

## 2022-09-16 RX ADMIN — LISINOPRIL 2.5 MILLIGRAM(S): 2.5 TABLET ORAL at 05:04

## 2022-09-16 RX ADMIN — QUETIAPINE FUMARATE 25 MILLIGRAM(S): 200 TABLET, FILM COATED ORAL at 22:45

## 2022-09-16 RX ADMIN — Medication 3 MILLILITER(S): at 00:58

## 2022-09-16 RX ADMIN — Medication 20 MILLIGRAM(S): at 05:03

## 2022-09-16 RX ADMIN — Medication 8 MILLIGRAM(S): at 05:03

## 2022-09-16 RX ADMIN — CHLORHEXIDINE GLUCONATE 1 APPLICATION(S): 213 SOLUTION TOPICAL at 06:45

## 2022-09-16 RX ADMIN — MEXILETINE HYDROCHLORIDE 200 MILLIGRAM(S): 150 CAPSULE ORAL at 22:46

## 2022-09-16 RX ADMIN — Medication 25 MILLIGRAM(S): at 15:12

## 2022-09-16 RX ADMIN — INSULIN HUMAN 3 UNIT(S)/HR: 100 INJECTION, SOLUTION SUBCUTANEOUS at 22:46

## 2022-09-16 RX ADMIN — HUMAN INSULIN 8 UNIT(S): 100 INJECTION, SUSPENSION SUBCUTANEOUS at 23:20

## 2022-09-16 RX ADMIN — Medication 500000 UNIT(S): at 23:58

## 2022-09-16 RX ADMIN — CHLORHEXIDINE GLUCONATE 5 MILLILITER(S): 213 SOLUTION TOPICAL at 05:04

## 2022-09-16 RX ADMIN — MEXILETINE HYDROCHLORIDE 200 MILLIGRAM(S): 150 CAPSULE ORAL at 15:12

## 2022-09-16 RX ADMIN — FLUCONAZOLE 100 MILLIGRAM(S): 150 TABLET ORAL at 04:22

## 2022-09-16 RX ADMIN — Medication 1 TABLET(S): at 12:27

## 2022-09-16 RX ADMIN — CHLORHEXIDINE GLUCONATE 5 MILLILITER(S): 213 SOLUTION TOPICAL at 18:00

## 2022-09-16 RX ADMIN — MEROPENEM 100 MILLIGRAM(S): 1 INJECTION INTRAVENOUS at 22:41

## 2022-09-16 RX ADMIN — MONTELUKAST 10 MILLIGRAM(S): 4 TABLET, CHEWABLE ORAL at 22:46

## 2022-09-16 RX ADMIN — Medication 3 MILLILITER(S): at 17:40

## 2022-09-16 RX ADMIN — Medication 3 MILLILITER(S): at 06:41

## 2022-09-16 RX ADMIN — Medication 250 MILLIGRAM(S): at 09:08

## 2022-09-16 RX ADMIN — Medication 50 MILLIGRAM(S): at 05:04

## 2022-09-16 NOTE — PROGRESS NOTE ADULT - ASSESSMENT
71F PMH DM2, COPD, secondary Adrenal Insufficiency on Chronic steroids, history of colorectal cancer s/p resection (colostomy bag), ? Hx of CAD, Chronic A fib on Eliquis, and tracheomalacia and multiple intubations, recent dx of OM presented to ED at [facility] with epigastric pain, belching and central chest pain. Found on CTA to have type A aortic dissection and transferred to [facility] for surgical evaluation by Dr. Cabrera. Now s/p aortic dissection repair on 9/07/22 . Endocrine consulted for assistance with transition from insulin drip to subcutaneous insulin.    Poorly controlled T2DM with hyperglycemia  home medications: Lantus 35 units sq qhs, novolog TID (based on carb count usually around 10 units)  A1c 9.4  while inpt: remains on insulin gtt due to changes in feeding status/and poor intake.  severe hypo 38 on 9/15 after bolus per CTU  insulin gtt policy,  s/p S&S->NPO w/ TF . TF restarted 9/16  - recommend transitioning to subq insulin today >CTU prefers to monitor on insulin gtt w/ TF At goal prior to transitioning to NPH/Scale.   - CHANGE to hyperglycemia insulin infusion protocol  as pt should NOT be receiving additional boluses,  [ to access on intranet select hospitals & facilities>[facility]>Policies & procedures>adult ICU patient care>Insulin infusion  (CICU MICU NSCU SICU) ]  - Goal -180 while in ICU, 100-180 on the floors  - FS q1h while on insulin gtt     Based on current insulin gtt rates (while pt was off TF) if restarted on tube feeding AND at goal rate:   -discontinue insulin gtt 1 hour after first NPH dose given  -consider starting NPH 7 units q 6 hours hold if TF off and start Admelog moderate correctional scale q6h   -FS q6h   - per d/w CTU Provider continuing insulin gtt until pt on TF at goal for atleast 6 hours for more accurate transition recommendations. Signed out to Endocrine Fellow Dr Mak to f/u britni     Discharge plan:  -Likely to discharge patient home on basal/bolus insulin. Final regimen pending clinical course.  -Recommend routine outpatient ophthalmology, podiatry and endocrinology f/u. Can f/u with endocrinologist Dr. Saavedra.    HTN  -Management per primary team.    HLD  -On rosuvastatin 5mg daily    Adrenal insufficiency 2/2 chronic steroid use  on chronic steroids at home: medrol 8mg qd   while admitted on prednisone 8mg po qd  do not discontinue steroids abruptly , if pt made npo or refuses prednisone will need IV steroid dose  if need to transition to IV would order methylprednisone 6 mg IV qd if stable d/w Dr Stevenson on 9/15      Discussed with patient and primary  team Mackenzie BRUNO and RN   Contact via Microsoft Teams during business hours  On evenings and weekends, please call [phone] or page endocrine fellow on call.   Email: [email or web address]   Please note that this patient may be followed by different provider tomorrow.    greater than 50% of the encounter was spent counseling and/or coordination of care.  33 minutes spent on total encounter; The necessity of the time spent during the encounter on this date of service was due to development of plan of care/coordination of care/glycemic control through review of labs, blood glucose values and vital signs.  71F PMH DM2, COPD, secondary Adrenal Insufficiency on Chronic steroids, history of colorectal cancer s/p resection (colostomy bag), ? Hx of CAD, Chronic A fib on Eliquis, and tracheomalacia and multiple intubations, recent dx of OM presented to ED at [facility] with epigastric pain, belching and central chest pain. Found on CTA to have type A aortic dissection and transferred to [facility] for surgical evaluation by Dr. Cabrera. Now s/p aortic dissection repair on 9/07/22 . Endocrine consulted for assistance with transition from insulin drip to subcutaneous insulin.    Poorly controlled T2DM with hyperglycemia  home medications: Lantus 35 units sq qhs, novolog TID (based on carb count usually around 10 units)  A1c 9.4  while inpt: remains on insulin gtt due to changes in feeding status/and poor intake.  severe hypo 38 on 9/15 after bolus per CTU  insulin gtt policy,  s/p S&S->NPO w/ TF . TF restarted 9/16  - recommend transitioning to subq insulin today >CTU prefers to monitor on insulin gtt w/ TF At goal prior to transitioning to NPH/Scale.   - CHANGE to hyperglycemia insulin infusion protocol  as pt should NOT be receiving additional boluses,  [ to access on intranet select hospitals & facilities>[facility]>Policies & procedures>adult ICU patient care>Insulin infusion  (CICU MICU NSCU SICU) ]  - Goal -180 while in ICU, 100-180 on the floors  - FS q1h while on insulin gtt   - Change abx from D5 to another carrier if appropriate    Based on current insulin gtt rates (while pt was off TF) if restarted on tube feeding AND at goal rate:   - discontinue insulin gtt 1 hour after first NPH dose given  - consider starting NPH 7 units q 6 hours hold if TF off and start Admelog moderate correctional scale q6h   - FS q6h   - per d/w CTU Provider continuing insulin gtt until pt on TF at goal for atleast 6 hours for more accurate transition recommendations. Signed out to Endocrine Fellow Dr Mak to f/u tonight     Discharge plan:  - Likely to discharge patient home on basal/bolus insulin. Final regimen pending clinical course.  - Recommend routine outpatient ophthalmology, podiatry and endocrinology f/u. Can f/u with endocrinologist Dr. Saavedra.    HTN  - Management per primary team.    HLD  - On rosuvastatin 5mg daily    Adrenal insufficiency 2/2 chronic steroid use  on chronic steroids at home: medrol 8mg qd   while admitted on prednisone 8mg po qd  do not discontinue steroids abruptly , if pt made npo or refuses prednisone will need IV steroid dose  if need to transition to IV would order methylprednisone 6 mg IV qd if stable d/w Dr Stevenson on 9/15      Discussed with patient and primary  team Mackenzie BRUNO and RN   Contact via Microsoft Teams during business hours  On evenings and weekends, please call [phone] or page endocrine fellow on call.   Email: [email or web address]   Please note that this patient may be followed by different provider tomorrow.    greater than 50% of the encounter was spent counseling and/or coordination of care.  33 minutes spent on total encounter; The necessity of the time spent during the encounter on this date of service was due to development of plan of care/coordination of care/glycemic control through review of labs, blood glucose values and vital signs.  71F PMH DM2, COPD, secondary Adrenal Insufficiency on Chronic steroids, history of colorectal cancer s/p resection (colostomy bag), ? Hx of CAD, Chronic A fib on Eliquis, and tracheomalacia and multiple intubations, recent dx of OM presented to ED at [facility] with epigastric pain, belching and central chest pain. Found on CTA to have type A aortic dissection and transferred to [facility] for surgical evaluation by Dr. Cabrera. Now s/p aortic dissection repair on 9/07/22 . Endocrine consulted for assistance with transition from insulin drip to subcutaneous insulin.    Poorly controlled T2DM with hyperglycemia  home medications: Lantus 35 units sq qhs, novolog TID (based on carb count usually around 10 units)  A1c 9.4  while inpt: remains on insulin gtt due to frequent changes in feeding status/and poor intake.  severe hypo 38 on 9/15 after bolus per CTU  insulin gtt policy,  s/p S&S->NPO w/ TF . TF restarted 9/16  - recommend transitioning to subq insulin today >CTU prefers to monitor on insulin gtt w/ TF At goal prior to transitioning to NPH/Scale.   - CHANGE to hyperglycemia insulin infusion protocol  as pt should NOT be receiving additional boluses,  [ to access on intranet select hospitals & facilities>[facility]>Policies & procedures>adult ICU patient care>Insulin infusion  (CICU MICU NSCU SICU) ]  - Goal -180 while in ICU, 100-180 on the floors  - FS q1h while on insulin gtt   - Change abx from D5 to another carrier if appropriate    Based on current insulin gtt rates (while pt was off TF) if restarted on tube feeding AND at goal rate:   - discontinue insulin gtt 1 hour after first NPH dose given  - consider starting NPH 7 units q 6 hours hold if TF off and start Admelog moderate correctional scale q6h   - FS q6h   - per d/w CTU Provider continuing insulin gtt until pt on TF at goal for atleast 6 hours for more accurate transition recommendations. Signed out to Endocrine Fellow Dr Mak to f/u veronaight     Discharge plan:  - Likely to discharge patient home on basal/bolus insulin. Final regimen pending clinical course.  - Recommend routine outpatient ophthalmology, podiatry and endocrinology f/u. Can f/u with endocrinologist Dr. Saavedra.    HTN  - Management per primary team.    HLD  - On rosuvastatin 5mg daily    Adrenal insufficiency 2/2 chronic steroid use  on chronic steroids at home: medrol 8mg qd   while admitted on prednisone 8mg po qd  do not discontinue steroids abruptly , if pt made npo or refuses prednisone will need IV steroid dose  if need to transition to IV would order methylprednisone 6 mg IV qd if stable d/w Dr Stevenson on 9/15      Discussed with patient and primary  team Mackenzie BRUNO and RN   Contact via Microsoft Teams during business hours  On evenings and weekends, please call [phone] or page endocrine fellow on call.   Email: [email or web address]   Please note that this patient may be followed by different provider tomorrow.    greater than 50% of the encounter was spent counseling and/or coordination of care.  33 minutes spent on total encounter; The necessity of the time spent during the encounter on this date of service was due to development of plan of care/coordination of care/glycemic control through review of labs, blood glucose values and vital signs.

## 2022-09-16 NOTE — CHART NOTE - NSCHARTNOTEFT_GEN_A_CORE
71F PMH DM, COPD, Chronic Adrenal Insufficiency on Chronic prednisone, history of colorectal cancer s/p resection (colostomy bag), Hx of CAD, Chronic A fib on Eliquis, and tracheomalacia s/p tracheoplasty, and multiple intubations, recent dx of OM presented to ED at [facility] this morning with epigastric pain, belching and central chest pain. Found on CTA to have type A aortic dissection and transferred to [facility] for surgical evaluation by Dr. Cabrera. Pt admitted for ascending aortic dissection. Patient went to OR for emergency type A aortic dissection repair with Dr. Cabrera. Extubated post-op on 9/7. On 9/8 pt  developed increasingly labored breathing and due to mild delirium and depressed mental status post operatively, patient was reintubated. Extubated 9/13.    *Pt known to this service. Patient seen for multiple bedside swallow evaluations during previous admissions. Seen for FEES 3/31/22 -->recommendations for puree/thin lquids due to trace penetration of minced and moist trial without immediate retrieval. 5/12/2022 MBS --> recommendations for regular solids/thin liquids, no laryngeal penetration/aspiration seen across textures. During this admission, consulted 9/9/22, however pt currently intubated.    9/15 CXR IMPRESSION: Limited evaluation of previously noted left pleural effusion as the left costophrenic angle is out of the field of view. Please note, no endotracheal tube is visualized, despite clinical history.    Patient seen 9/14 for bedside swallow evaluation, Aox4 at the time and recommendations for soft-bite sized solids/mildly thick liquids. NGT remained in place due to poor PO intake following evaluation.  9/16-->low grade temp-fever work up in progress, no resp decline or sx. WBC increased 20.80 to 22.36. Intermittent delirium and agitation at night. NGT remains in place due to poor PO intake. New baseline wet cough, no coughing or change in vocal quality noted s/p PO intake as per RADHA Thomas.    Today, patient seen for follow up to reassess swallow function. Patient encountered semi-supine in regan chair, on room air, Aox3, + NGT in place, awake/alert, hypophonic however improved with effort. Patient seated in upright position. Vocal quality clear, baseline cough slightly wet + junky. PO trials administered of mildly thick liquids and moderately thick liquids. There is adequate bolus control, mild latency in the swallow response, and palpable hyolaryngeal elevation. No overt s/s aspiration observed s/p trials, however cued cough notable to be slightly more wet.     D/W KIANA Mann and ALLEY Encarnacion, patient undergoing workup for infection.     Impression: Given new baseline cough, leukocytosis, intermittent delirium and agitation as per team, history of dysphagia, and recent extubation, would recommend NPO, with non-oral nutrition/hydration/medications given aforementioned details. Patient has a means of nutrition/hydration at this time.     Recommendations:   1) NPO, with non-oral nutrition/hydration/medications.   2) Strict aspiration and reflux precautions!   3) This service to follow up to determine candidacy for reassessment of swallow function and instrumental assessment.    Isabel Espinosa CCC-SLP   Prefer teams or [phone] 71F PMH DM, COPD, Chronic Adrenal Insufficiency on Chronic prednisone, history of colorectal cancer s/p resection (colostomy bag), Hx of CAD, Chronic A fib on Eliquis, and tracheomalacia s/p tracheoplasty, and multiple intubations, recent dx of OM presented to ED at [facility] this morning with epigastric pain, belching and central chest pain. Found on CTA to have type A aortic dissection and transferred to [facility] for surgical evaluation by Dr. Cabrera. Pt admitted for ascending aortic dissection. Patient went to OR for emergency type A aortic dissection repair with Dr. Cabrera. Extubated post-op on 9/7. On 9/8 pt  developed increasingly labored breathing and due to mild delirium and depressed mental status post operatively, patient was reintubated. Extubated 9/13.    *Pt known to this service. Patient seen for multiple bedside swallow evaluations during previous admissions. Seen for FEES 3/31/22 -->recommendations for puree/thin lquids due to trace penetration of minced and moist trial without immediate retrieval. 5/12/2022 MBS --> recommendations for regular solids/thin liquids, no laryngeal penetration/aspiration seen across textures. During this admission, consulted 9/9/22, however pt currently intubated.    9/15 CXR IMPRESSION: Limited evaluation of previously noted left pleural effusion as the left costophrenic angle is out of the field of view. Please note, no endotracheal tube is visualized, despite clinical history.    Patient seen 9/14 for bedside swallow evaluation, Aox4 at the time and recommendations for soft-bite sized solids/mildly thick liquids. NGT remained in place due to poor PO intake following evaluation.  9/16-->low grade temp-fever work up in progress, no resp decline or sx. WBC increased 20.80 to 22.36. Intermittent delirium and agitation at night. NGT remains in place due to poor PO intake. New baseline wet cough, no coughing or change in vocal quality noted s/p PO intake as per RADHA Thomas.    Today, patient seen for follow up to reassess swallow function. Patient encountered semi-supine in regan chair, on room air, Aox3, + NGT in place, awake/alert, hypophonic however improved with effort. Patient seated in upright position. Vocal quality clear, baseline cough slightly wet + junky. PO trials administered of mildly thick liquids and moderately thick liquids. There is adequate bolus control, mild latency in the swallow response, and palpable hyolaryngeal elevation. No overt s/s aspiration observed s/p trials, however cued cough notable to be slightly more wet.     D/W KIANA Mann and ALLEY Encarnacion, patient undergoing workup for infection.     Impression: Given new baseline cough, leukocytosis, intermittent delirium and agitation as per team, history of dysphagia, and recent extubation, would recommend NPO, with non-oral nutrition/hydration/medications given aforementioned details. Patient has a means of nutrition/hydration at this time. Tentative plan for FEES 9/20 pending improvement in overall status.     Recommendations:   1) NPO, with non-oral nutrition/hydration/medications.   2) Strict aspiration and reflux precautions!     Isabel Espinosa CCC-SLP   Prefer teams or [phone]

## 2022-09-16 NOTE — PROGRESS NOTE ADULT - ASSESSMENT
71 f with DM, CAD, a-fib, asthma/COPD, Chronic Adrenal Insufficiency on steroids, history of colorectal cancer s/p resection and ostomy, tracheomalacia and multiple intubations, recent admission for sepsis, foot osteo and abscess s/p debridement and OR cx with MRSA, ESBL proteus and corynebacterium s/p 6 weeks of vanco and ertapenem which ended 8/17, now p/w chest pain, found to have  type A aortic dissection, s/p modified Bentall and hemiarch on 9/6/22.   Post op course complicated by shock, respiratory failure, anemia and hyperglycemia.   fever started 9/9, sputum cx with proteus mirabilis    fever post op for aortic dissection, sputum cx with proteus, pt was still febrile on zosyn but last wound cx that showed proteus was ESBL, switched to suraj and still persistently febrile and the proteus now is pan sensitive, chest /abd CT 9/12 with small fluid around the ascending aorta but no enhancing or abscess, b/l lower lobe atelectasis, ?mild colitis  doppler: thrombosed and occluded RIJ  adrenal insufficiency, was on prednisone, was given dexa 9/12 and 9/13 and no more fevers until 16th, extubated 9/13 so the persistent fevers could be in the setting of adrenal insufficiency as imaging and cx negative, pt already extubated and no focal symptoms  blood and urine cx negative, ALT, AST increased today to 200s, no bili or ALK, unlikely due to suraj  penicillin and cefepime allergy in chart but pt has received cefepime and ceftriaxone before    * was on cefepime 9/9, switched to suraj 9/10, and then ceftriaxone on 6/14 to complete the course but pt became febrile again and continues to spike although clinically well, non toxic, vanco, suraj was resumed by CT  * f/u the blood and urine cx  * I don't think yeast in the urine is responsible for patient's fevers and urine cx was negative 9/10  * endocrine eval for adrenal insufficiency  * monitor WBC/diff and renal function      The above assessment and plan was discussed with CTU    Michelle Rolon MD  contact on teams  After 5pm and on weekends call [phone]         71 f with DM, CAD, a-fib, asthma/COPD, Chronic Adrenal Insufficiency on steroids, history of colorectal cancer s/p resection and ostomy, tracheomalacia and multiple intubations, recent admission for sepsis, foot osteo and abscess s/p debridement and OR cx with MRSA, ESBL proteus and corynebacterium s/p 6 weeks of vanco and ertapenem which ended 8/17, now p/w chest pain, found to have  type A aortic dissection, s/p modified Bentall and hemiarch on 9/6/22.   Post op course complicated by shock, respiratory failure, anemia and hyperglycemia.   fever started 9/9, sputum cx with proteus mirabilis    fever post op for aortic dissection, sputum cx with proteus, pt was still febrile on zosyn but last wound cx that showed proteus was ESBL, switched to suraj and still persistently febrile and the proteus now is pan sensitive, chest /abd CT 9/12 with small fluid around the ascending aorta but no enhancing or abscess, b/l lower lobe atelectasis, ?mild colitis  doppler: thrombosed and occluded RIJ  adrenal insufficiency, was on prednisone, was given dexa 9/12 and 9/13 and no more fevers until 16th, extubated 9/13 so the persistent fevers could be in the setting of adrenal insufficiency as imaging and cx negative, pt already extubated and no focal symptoms  blood and urine cx negative, ALT, AST increased today to 200s, no bili or ALK, unlikely due to suraj  penicillin and cefepime allergy in chart but pt has received cefepime and ceftriaxone before    * was on cefepime 9/9, switched to suraj 9/10, and then ceftriaxone on 6/14 to complete the course but pt became febrile again and continues to spike although clinically well, non toxic, vanco, suraj was resumed by CT, will continue for now  * f/u the blood and urine cx  * I don't think yeast in the urine is responsible for patient's fevers and urine cx was negative 9/10  * endocrine eval for adrenal insufficiency  * monitor WBC/diff and renal function      The above assessment and plan was discussed with CTU    Michelle Rolon MD  contact on teams  After 5pm and on weekends call [phone]

## 2022-09-16 NOTE — PROGRESS NOTE ADULT - SUBJECTIVE AND OBJECTIVE BOX
Follow Up:  fever    Interval History/ROS: pt again spiking continuously  but clinically well and no complaints, no cough, diarrhea          Allergies  aspirin (Short breath)  Avelox (Short breath; Pruritus)  cefepime (Anaphylaxis)  codeine (Short breath)  Dilaudid (Short breath)  iodine (Short breath; Swelling)  penicillin (Anaphylaxis)  shellfish (Anaphylaxis)  tetanus toxoid (Short breath)  Valium (Short breath)        ANTIMICROBIALS:  fluconAZOLE IVPB 200 every 24 hours  meropenem  IVPB 1000 every 8 hours  nystatin    Suspension 559053 every 6 hours  vancomycin  IVPB 1000 every 12 hours      OTHER MEDS:  albuterol/ipratropium for Nebulization 3 milliLiter(s) Nebulizer every 6 hours  apixaban 5 milliGRAM(s) Oral every 12 hours  buDESOnide    Inhalation Suspension 0.5 milliGRAM(s) Inhalation every 12 hours  chlorhexidine 0.12% Liquid 5 milliLiter(s) Oral Mucosa two times a day  chlorhexidine 2% Cloths 1 Application(s) Topical <User Schedule>  collagenase Ointment 1 Application(s) Topical daily  furosemide    Tablet 20 milliGRAM(s) Oral daily  insulin regular Infusion 3 Unit(s)/Hr IV Continuous <Continuous>  metoprolol tartrate 25 milliGRAM(s) Oral every 8 hours  mexiletine 200 milliGRAM(s) Oral every 8 hours  montelukast 10 milliGRAM(s) Oral at bedtime  multivitamin 1 Tablet(s) Oral daily  pantoprazole  Injectable 40 milliGRAM(s) IV Push daily  predniSONE   Tablet 8 milliGRAM(s) Oral daily  QUEtiapine 25 milliGRAM(s) Oral at bedtime  spironolactone 25 milliGRAM(s) Oral every 12 hours      Vital Signs Last 24 Hrs  T(C): 36.5 (16 Sep 2022 12:00), Max: 38.6 (16 Sep 2022 02:00)  T(F): 97.7 (16 Sep 2022 12:00), Max: 101.5 (16 Sep 2022 02:00)  HR: 94 (16 Sep 2022 14:00) (77 - 112)  BP: 113/73 (16 Sep 2022 13:00) (97/54 - 153/80)  BP(mean): 85 (16 Sep 2022 13:00) (68 - 101)  RR: 29 (16 Sep 2022 14:00) (14 - 30)  SpO2: 97% (16 Sep 2022 14:00) (93% - 100%)    Parameters below as of 16 Sep 2022 12:00  Patient On (Oxygen Delivery Method): room air        Physical Exam:  General:  NAD, non toxic  Respiratory:   clear b/l,    no wheezing  abd:     soft,   BS +,   no tenderness  :   no CVAT,  no suprapubic tenderness,   no  ramirez  Musculoskeletal:   no joint swelling  vascular: R chest port with no tenderness or erythema  Skin:    no rash                            11.5   22.36 )-----------( 302      ( 16 Sep 2022 00:37 )             38.2           134<L>  |  101  |  23  ----------------------------<  148<H>  4.4   |  24  |  0.72    Ca    8.5      16 Sep 2022 00:37  Phos  3.0       Mg     2.1         TPro  6.1  /  Alb  3.1<L>  /  TBili  0.4  /  DBili  x   /  AST  31  /  ALT  226<H>  /  AlkPhos  124<H>        Urinalysis Basic - ( 16 Sep 2022 03:19 )    Color: Yellow / Appearance: Slightly Turbid / S.018 / pH: x  Gluc: x / Ketone: Small  / Bili: Negative / Urobili: Negative   Blood: x / Protein: 30 mg/dL / Nitrite: Negative   Leuk Esterase: Large / RBC: 8 /hpf / WBC 95 /HPF   Sq Epi: x / Non Sq Epi: 0 /hpf / Bacteria: Negative        MICROBIOLOGY:  v  .Blood Blood  22   No growth to date.  --  --      .Blood Blood  09-10-22   No Growth Final  --  --      Catheterized Catheterized  09-10-22   <10,000 CFU/mL Normal Urogenital Jessica  --  --      .Sputum Sputum  22   Numerous Proteus mirabilis  Unable to evaluate further due to Proteus overgrowth  --  Proteus mirabilis                RADIOLOGY:  Images independently visualized and reviewed personally, findings as below  < from: Xray Chest 1 View- PORTABLE-Routine (Xray Chest 1 View- PORTABLE-Routine in AM.) (09.15.22 @ 03:24) >    IMPRESSION:  *  Limited evaluation of previously noted left pleural effusion as the   left costophrenic angle is out of the field of view.  *  Please note, no endotracheal tube is visualized, despite clinical   history.    < end of copied text >  < from: CT Abdomen and Pelvis w/ IV Cont (22 @ 16:31) >  IMPRESSION:  1.  Status post recent ascending aortic dissection repair. Small amount   of fluid surrounding the ascending aorta without evidence of enhancing   wall to suggest abscess.  2.  Bilateral lower lobe atelectasis with bilateral pleural effusions.  3.  Mild thickening of the cecum and ascending colon possibly   representing mild nonspecific proximal colitis.  4.  Hepatic cirrhosis.  5.  The focal IPMN of the pancreas with large cyst within the tail the   pancreas measuring 3.1 cm. Follow-up recommended.      < end of copied text >  < from: Intra-Operative Transesophageal Echo (22 @ 22:54) >  Conclusions:  1. Normal mitral valve. Mild mitral regurgitation.  2. Calcified trileaflet aortic valve with decreased  opening. Moderate aortic regurgitation.  3. Aortic Annulus: 1.9 cm.  Aortic Root: 3.9 cm.  Sinotubular Junction: 4 cm.  LVOT diameter: 1.9 cm.  A dissection flap was noted in the proximal ascending aorta  extending into arch and decscending thoracic and abdominal  aorta. A clear end of dissection was by SAFIA in transgastric  views.  SAFIA guidaince for aortic cannulation of true lumen  provided.  4. Moderate concentric left ventricular hypertrophy.  5. Normal left ventricular systolic function. No segmental  wall motion abnormalities.  6. Normal right atrium.  7. Normal right ventricular size and function.  8. Normal tricuspid valve. Mild tricuspid regurgitation.  9. Moderate pericardial effusion with no tamponade noted  pre-bypass    < end of copied text >     Follow Up:  fever    Interval History/ROS: pt started to spike today, but clinically well and no complaints, no SOB or diarrhea          Allergies  aspirin (Short breath)  Avelox (Short breath; Pruritus)  cefepime (Anaphylaxis)  codeine (Short breath)  Dilaudid (Short breath)  iodine (Short breath; Swelling)  penicillin (Anaphylaxis)  shellfish (Anaphylaxis)  tetanus toxoid (Short breath)  Valium (Short breath)        ANTIMICROBIALS:  fluconAZOLE IVPB 200 every 24 hours  meropenem  IVPB 1000 every 8 hours  nystatin    Suspension 250650 every 6 hours  vancomycin  IVPB 1000 every 12 hours      OTHER MEDS:  albuterol/ipratropium for Nebulization 3 milliLiter(s) Nebulizer every 6 hours  apixaban 5 milliGRAM(s) Oral every 12 hours  buDESOnide    Inhalation Suspension 0.5 milliGRAM(s) Inhalation every 12 hours  chlorhexidine 0.12% Liquid 5 milliLiter(s) Oral Mucosa two times a day  chlorhexidine 2% Cloths 1 Application(s) Topical <User Schedule>  collagenase Ointment 1 Application(s) Topical daily  furosemide    Tablet 20 milliGRAM(s) Oral daily  insulin regular Infusion 3 Unit(s)/Hr IV Continuous <Continuous>  metoprolol tartrate 25 milliGRAM(s) Oral every 8 hours  mexiletine 200 milliGRAM(s) Oral every 8 hours  montelukast 10 milliGRAM(s) Oral at bedtime  multivitamin 1 Tablet(s) Oral daily  pantoprazole  Injectable 40 milliGRAM(s) IV Push daily  predniSONE   Tablet 8 milliGRAM(s) Oral daily  QUEtiapine 25 milliGRAM(s) Oral at bedtime  spironolactone 25 milliGRAM(s) Oral every 12 hours      Vital Signs Last 24 Hrs  T(C): 36.5 (16 Sep 2022 12:00), Max: 38.6 (16 Sep 2022 02:00)  T(F): 97.7 (16 Sep 2022 12:00), Max: 101.5 (16 Sep 2022 02:00)  HR: 94 (16 Sep 2022 14:00) (77 - 112)  BP: 113/73 (16 Sep 2022 13:00) (97/54 - 153/80)  BP(mean): 85 (16 Sep 2022 13:00) (68 - 101)  RR: 29 (16 Sep 2022 14:00) (14 - 30)  SpO2: 97% (16 Sep 2022 14:00) (93% - 100%)    Parameters below as of 16 Sep 2022 12:00  Patient On (Oxygen Delivery Method): room air        Physical Exam:  General:  NAD, non toxic  Respiratory:   clear b/l,    no wheezing  abd:     soft,   BS +,   no tenderness  :   no CVAT,  no suprapubic tenderness,   no  ramirez  Musculoskeletal:   no joint swelling  vascular: R chest port with no tenderness or erythema  Skin:    no rash                            11.5   22.36 )-----------( 302      ( 16 Sep 2022 00:37 )             38.2           134<L>  |  101  |  23  ----------------------------<  148<H>  4.4   |  24  |  0.72    Ca    8.5      16 Sep 2022 00:37  Phos  3.0       Mg     2.1         TPro  6.1  /  Alb  3.1<L>  /  TBili  0.4  /  DBili  x   /  AST  31  /  ALT  226<H>  /  AlkPhos  124<H>        Urinalysis Basic - ( 16 Sep 2022 03:19 )    Color: Yellow / Appearance: Slightly Turbid / S.018 / pH: x  Gluc: x / Ketone: Small  / Bili: Negative / Urobili: Negative   Blood: x / Protein: 30 mg/dL / Nitrite: Negative   Leuk Esterase: Large / RBC: 8 /hpf / WBC 95 /HPF   Sq Epi: x / Non Sq Epi: 0 /hpf / Bacteria: Negative        MICROBIOLOGY:  v  .Blood Blood  22   No growth to date.  --  --      .Blood Blood  09-10-22   No Growth Final  --  --      Catheterized Catheterized  09-10-22   <10,000 CFU/mL Normal Urogenital Jessica  --  --      .Sputum Sputum  22   Numerous Proteus mirabilis  Unable to evaluate further due to Proteus overgrowth  --  Proteus mirabilis                RADIOLOGY:  Images independently visualized and reviewed personally, findings as below  < from: Xray Chest 1 View- PORTABLE-Routine (Xray Chest 1 View- PORTABLE-Routine in AM.) (09.15.22 @ 03:24) >    IMPRESSION:  *  Limited evaluation of previously noted left pleural effusion as the   left costophrenic angle is out of the field of view.  *  Please note, no endotracheal tube is visualized, despite clinical   history.    < end of copied text >  < from: CT Abdomen and Pelvis w/ IV Cont (22 @ 16:31) >  IMPRESSION:  1.  Status post recent ascending aortic dissection repair. Small amount   of fluid surrounding the ascending aorta without evidence of enhancing   wall to suggest abscess.  2.  Bilateral lower lobe atelectasis with bilateral pleural effusions.  3.  Mild thickening of the cecum and ascending colon possibly   representing mild nonspecific proximal colitis.  4.  Hepatic cirrhosis.  5.  The focal IPMN of the pancreas with large cyst within the tail the   pancreas measuring 3.1 cm. Follow-up recommended.      < end of copied text >  < from: Intra-Operative Transesophageal Echo (22 @ 22:54) >  Conclusions:  1. Normal mitral valve. Mild mitral regurgitation.  2. Calcified trileaflet aortic valve with decreased  opening. Moderate aortic regurgitation.  3. Aortic Annulus: 1.9 cm.  Aortic Root: 3.9 cm.  Sinotubular Junction: 4 cm.  LVOT diameter: 1.9 cm.  A dissection flap was noted in the proximal ascending aorta  extending into arch and decscending thoracic and abdominal  aorta. A clear end of dissection was by SAFIA in transgastric  views.  SAFIA guidaince for aortic cannulation of true lumen  provided.  4. Moderate concentric left ventricular hypertrophy.  5. Normal left ventricular systolic function. No segmental  wall motion abnormalities.  6. Normal right atrium.  7. Normal right ventricular size and function.  8. Normal tricuspid valve. Mild tricuspid regurgitation.  9. Moderate pericardial effusion with no tamponade noted  pre-bypass    < end of copied text >

## 2022-09-16 NOTE — PROGRESS NOTE ADULT - SUBJECTIVE AND OBJECTIVE BOX
seen earlier today     Chief Complaint: Type 2 Diabetes Mellitus     INTERVAL HX:  S&S done- aspiration precautions> NPO w/ TF being restarted today. severe hypoglycemia to 38 yesterday received d50% and juice with improvement in BG , per d/w provider pt received bolus per [facility] insulin gtt policy, insulin gtt was off from [id number],  denies hypo symptoms ,  d/w team, team would like to monitor on TF at goal rate while on insulin gtt before transitioning to NPH/Scale tonight . Appears calmer today and verbalized  agreement to allowing RNs to give her insulin injections when ready to transition . low grade temp over night with WBC uptrending 22 today    Review of Systems:  General: As above  Cardiovascular: No chest pain  Respiratory: No SOB  GI: No nausea, vomiting  Endocrine: no  S&Sx of hypoglycemia    Allergies    aspirin (Short breath)  Avelox (Short breath; Pruritus)  cefepime (Anaphylaxis)  codeine (Short breath)  Dilaudid (Short breath)  iodine (Short breath; Swelling)  penicillin (Anaphylaxis)  shellfish (Anaphylaxis)  tetanus toxoid (Short breath)  Valium (Short breath)    Intolerances      MEDICATIONS  (STANDING):  albuterol/ipratropium for Nebulization 3 milliLiter(s) Nebulizer every 6 hours  apixaban 5 milliGRAM(s) Oral every 12 hours  buDESOnide    Inhalation Suspension 0.5 milliGRAM(s) Inhalation every 12 hours  chlorhexidine 0.12% Liquid 5 milliLiter(s) Oral Mucosa two times a day  chlorhexidine 2% Cloths 1 Application(s) Topical <User Schedule>  collagenase Ointment 1 Application(s) Topical daily  fluconAZOLE IVPB 200 milliGRAM(s) IV Intermittent every 24 hours  furosemide    Tablet 20 milliGRAM(s) Oral daily  insulin regular Infusion 3 Unit(s)/Hr (3 mL/Hr) IV Continuous <Continuous>  meropenem  IVPB 1000 milliGRAM(s) IV Intermittent every 8 hours  metoprolol tartrate 25 milliGRAM(s) Oral every 8 hours  mexiletine 200 milliGRAM(s) Oral every 8 hours  montelukast 10 milliGRAM(s) Oral at bedtime  multivitamin 1 Tablet(s) Oral daily  nystatin    Suspension [id number] Unit(s) Oral every 6 hours  pantoprazole  Injectable 40 milliGRAM(s) IV Push daily  predniSONE   Tablet 8 milliGRAM(s) Oral daily  QUEtiapine 25 milliGRAM(s) Oral at bedtime  spironolactone 25 milliGRAM(s) Oral every 12 hours  vancomycin  IVPB 1000 milliGRAM(s) IV Intermittent every 12 hours        predniSONE   Tablet   8 milliGRAM(s) Oral (09-16-22 @ 05:03)        PHYSICAL EXAM:  VITALS: T(C): 37 (09-16-22 @ 16:00)  T(F): 98.6 (09-16-22 @ 16:00), Max: 101.5 (09-16-22 @ 02:00)  HR: 101 (09-16-22 @ 15:00) (78 - 112)  BP: 113/73 (09-16-22 @ 13:00) (97/54 - 153/80)  RR:  (14 - 30)  SpO2:  (93% - 100%)  Wt(kg): --  GENERAL: female sitting in chair  in NAD NGT   Respiratory: Respirations unlabored   Extremities: Warm, no edema  NEURO: Alert , answering simple questions ,pt appears calmer today       LABS:    POCT Blood Glucose.: 167 mg/dL (09-16-22 @ 15:49)  POCT Blood Glucose.: 142 mg/dL (09-16-22 @ 14:50)  POCT Blood Glucose.: 119 mg/dL (09-16-22 @ 13:47)  POCT Blood Glucose.: 98 mg/dL (09-16-22 @ 12:59)  POCT Blood Glucose.: 120 mg/dL (09-16-22 @ 12:16)  POCT Blood Glucose.: 171 mg/dL (09-16-22 @ 11:13)  POCT Blood Glucose.: 166 mg/dL (09-16-22 @ 10:30)  POCT Blood Glucose.: 115 mg/dL (09-16-22 @ 10:11)  POCT Blood Glucose.: 175 mg/dL (09-16-22 @ 08:50)  POCT Blood Glucose.: 159 mg/dL (09-16-22 @ 08:03)  POCT Blood Glucose.: 170 mg/dL (09-16-22 @ 07:04)  POCT Blood Glucose.: 175 mg/dL (09-16-22 @ 06:24)  POCT Blood Glucose.: 100 mg/dL (09-16-22 @ 05:09)  POCT Blood Glucose.: 174 mg/dL (09-16-22 @ 04:03)  POCT Blood Glucose.: 156 mg/dL (09-16-22 @ 03:17)  POCT Blood Glucose.: 144 mg/dL (09-16-22 @ 01:40)  POCT Blood Glucose.: 136 mg/dL (09-16-22 @ 00:27)  POCT Blood Glucose.: 186 mg/dL (09-15-22 @ 23:06)  POCT Blood Glucose.: 190 mg/dL (09-15-22 @ 22:28)  POCT Blood Glucose.: 155 mg/dL (09-15-22 @ 18:55)  POCT Blood Glucose.: 137 mg/dL (09-15-22 @ 17:58)  POCT Blood Glucose.: 123 mg/dL (09-15-22 @ 17:02)  POCT Blood Glucose.: 296 mg/dL (09-15-22 @ 15:29)  POCT Blood Glucose.: 40 mg/dL (09-15-22 @ 15:22)  POCT Blood Glucose.: 38 mg/dL (09-15-22 @ 15:20)  POCT Blood Glucose.: 157 mg/dL (09-15-22 @ 13:12)  POCT Blood Glucose.: 145 mg/dL (09-15-22 @ 11:07)  POCT Blood Glucose.: 185 mg/dL (09-15-22 @ 09:48)  POCT Blood Glucose.: 156 mg/dL (09-15-22 @ 08:47)  POCT Blood Glucose.: 129 mg/dL (09-15-22 @ 07:52)  POCT Blood Glucose.: 171 mg/dL (09-15-22 @ 06:52)  POCT Blood Glucose.: 151 mg/dL (09-15-22 @ 06:03)  POCT Blood Glucose.: 95 mg/dL (09-15-22 @ 04:47)  POCT Blood Glucose.: 103 mg/dL (09-15-22 @ 03:55)  POCT Blood Glucose.: 134 mg/dL (09-15-22 @ 02:45)  POCT Blood Glucose.: 138 mg/dL (09-15-22 @ 02:08)  POCT Blood Glucose.: 173 mg/dL (09-15-22 @ 01:17)  POCT Blood Glucose.: 139 mg/dL (09-14-22 @ 23:14)  POCT Blood Glucose.: 104 mg/dL (09-14-22 @ 22:00)  POCT Blood Glucose.: 91 mg/dL (09-14-22 @ 21:09)  POCT Blood Glucose.: 110 mg/dL (09-14-22 @ 20:10)  POCT Blood Glucose.: 132 mg/dL (09-14-22 @ 19:01)  POCT Blood Glucose.: 150 mg/dL (09-14-22 @ 17:44)  POCT Blood Glucose.: 206 mg/dL (09-14-22 @ 17:07)  POCT Blood Glucose.: 176 mg/dL (09-14-22 @ 16:21)  POCT Blood Glucose.: 220 mg/dL (09-14-22 @ 15:12)  POCT Blood Glucose.: 216 mg/dL (09-14-22 @ 13:53)  POCT Blood Glucose.: 188 mg/dL (09-14-22 @ 12:57)  POCT Blood Glucose.: 92 mg/dL (09-14-22 @ 11:59)  POCT Blood Glucose.: 102 mg/dL (09-14-22 @ 10:53)  POCT Blood Glucose.: 106 mg/dL (09-14-22 @ 09:59)  POCT Blood Glucose.: 116 mg/dL (09-14-22 @ 08:43)  POCT Blood Glucose.: 112 mg/dL (09-14-22 @ 07:59)  POCT Blood Glucose.: 115 mg/dL (09-14-22 @ 06:50)  POCT Blood Glucose.: 116 mg/dL (09-14-22 @ 05:58)  POCT Blood Glucose.: 136 mg/dL (09-14-22 @ 04:58)  POCT Blood Glucose.: 125 mg/dL (09-14-22 @ 03:50)  POCT Blood Glucose.: 114 mg/dL (09-14-22 @ 03:00)  POCT Blood Glucose.: 113 mg/dL (09-14-22 @ 01:51)  POCT Blood Glucose.: 109 mg/dL (09-14-22 @ 01:04)  POCT Blood Glucose.: 110 mg/dL (09-13-22 @ 23:53)  POCT Blood Glucose.: 106 mg/dL (09-13-22 @ 23:07)  POCT Blood Glucose.: 113 mg/dL (09-13-22 @ 21:48)  POCT Blood Glucose.: 112 mg/dL (09-13-22 @ 20:54)  POCT Blood Glucose.: 116 mg/dL (09-13-22 @ 19:55)  POCT Blood Glucose.: 104 mg/dL (09-13-22 @ 18:47)  POCT Blood Glucose.: 116 mg/dL (09-13-22 @ 18:06)  POCT Blood Glucose.: 106 mg/dL (09-13-22 @ 17:05)                            11.5   22.36 )-----------( 302      ( 16 Sep 2022 00:37 )             38.2       09-16    134<L>  |  101  |  23  ----------------------------<  148<H>  4.4   |  24  |  0.72    Ca    8.5      16 Sep 2022 00:37  Phos  3.0     09-16  Mg     2.1     09-16    TPro  6.1  /  Alb  3.1<L>  /  TBili  0.4  /  DBili  x   /  AST  31  /  ALT  226<H>  /  AlkPhos  124<H>  09-16      eGFR: 88 mL/min/1.73m2 (16 Sep 2022 00:37)          Thyroid Function Tests:  09-06 @ 16:46 TSH 3.30 FreeT4 -- T3 -- Anti TPO -- Anti Thyroglobulin Ab -- TSI --          A1C with Estimated Average Glucose Result: 9.4 % (09-06-22 @ 16:46)  A1C with Estimated Average Glucose Result: 9.2 % (07-11-22 @ 07:36)      Estimated Average Glucose: 223 mg/dL (09-06-22 @ 16:46)  Estimated Average Glucose: 217 mg/dL (07-11-22 @ 07:36)

## 2022-09-16 NOTE — PROGRESS NOTE ADULT - ASSESSMENT
71 year-old female with a history of DM2, CAD, A-Fib on apixaban, Severe Persistent Asthma (on chronic steroids, recently started on Tezspire), colon cancer s/p resection/chemo, and tracheobronchomalacia s/p tracheoplasty, and recent OM of the R foot s/b debridement and completed course of Vancomycin/Ertapenem for MRSA/ESBL Proteus/Corynebacterium who now presents with chest pain, found to have Type A dissection s/p repair with modified Bentall procedure and hemiarch replacement on 9/6/22. Post-op course complicated by acute hypoxemic respiratory failure, shock, anemia, and hyperglycemia requiring insulin gtt. Extubated 9/13, now awake and alert with mild encephalopathy but overall significantly improved.    Assessment:  Acute hypoxemic respiratory failure requiring intubation  Type A Aortic Dissection  Severe Persistent Asthma  History of Tracheobronchomalacia    Plan:  - Currently doing well on NC O2-6 liters NC  - If respiratory status remains stable, would taper to NC@4-6 LPM, goal SpO2>92%  - Continue prednisone at 8 mg daily (chronic dose for her severe persistent asthma)  - Albuterol and ipratropium nebs q6h    - Budesonide 0.5 mg nebs q12h    - Chest PT, incentive spirometry, out of bed to chair  - S/p Tezspire injection on 8/29  - Re-starting montelukast 10 mg at bedtime (takes Zafirlukast as outpatient)  - Remains on heparin gtt for A-Fib + RIJ thrombus  - R arm elevation and warm compresses for superficial thrombophlebitis with edema  - Remains on mexiletine and metoprolol for A-Fib  - Currently appears euvolemic, on furosemide 40 mg oral daily, strict I/O's, close monitoring of kidney function and lytes  - On meropenem for Proteus mirabilis pneumonia + ESBL Proteus mirabilis infection of right foot, f/up ID and podiatry  - Discussed with patient and daughter Zoe at bedside, full code  **************************                                SEE Below:  9/14-improving but weakness  9/15-ABX adjusted to ceftriaxone (LFTS)-PICU  9/16-PICU, low grade temp-fever work up in progress, no resp decline or sx

## 2022-09-16 NOTE — PROGRESS NOTE ADULT - SUBJECTIVE AND OBJECTIVE BOX
Patient seen and examined at the bedside.    Remained critically ill on continuous ICU monitoring.    OBJECTIVE:  Vital Signs Last 24 Hrs  T(C): 36 (16 Sep 2022 08:00), Max: 38.6 (16 Sep 2022 02:00)  T(F): 96.8 (16 Sep 2022 08:00), Max: 101.5 (16 Sep 2022 02:00)  HR: 85 (16 Sep 2022 08:00) (77 - 112)  BP: 102/86 (16 Sep 2022 08:00) (102/86 - 158/83)  BP(mean): 93 (16 Sep 2022 08:00) (72 - 102)  RR: 28 (16 Sep 2022 08:00) (14 - 28)  SpO2: 97% (16 Sep 2022 08:00) (93% - 100%)    Parameters below as of 16 Sep 2022 08:00  Patient On (Oxygen Delivery Method): room air          Physical Exam:   General: obese, elderly female, OOBTC, NAD  Neurology: sedated, but opens eyes, not following commands  Eyes: right pupil reactive to light, left prosthetic eye  ENT/Neck: Neck supple, trachea midline, No JVD, +ETT midline   Respiratory: Clear bilaterally   CV: S1S2, no murmurs        [x] Sternal dressing        [x] Afib,  [x] Temporary pacing, - VVI 50  Abdominal: Soft, NT, ND, colostomy in place  Extremities: 1+ pedal edema noted, + peripheral pulses   Skin: Dry dressing over right heel, no Rashes, Hematoma, Ecchymosis                                 Assessment:  71F PMH DM, COPD, Chronic Adrenal Insufficiency on Chronic prednisone, history of colorectal cancer s/p resection (colostomy bag), Hx of CAD, Chronic A fib on Eliquis, and tracheomalacia and multiple intubations, recent dx of OM presented to ED [facility] this morning with epigastric pain, belching and central chest pain. Found on CTA to have type A aortic dissection and transferred to [facility] for surgical evaluation by Dr. Cabrera.     Ascending aortic dissection s/p modified bentall and hemiarch on 9/6   Hypovolemic shock   Post op respiratory insufficiency  Acute blood loss anemia  Stress hyperglycemia   RIJ thrombus  Pancreatic cyst    Proteus PNA        Plan:   ***Neuro***  [x] Nonfocal   Episode of delerium yesterday, given low dose of precedex   Post operative neuro assessment   c/w Seroquel         ***Cardiovascular***  CT Chest on 9/12:  Status post recent ascending aortic dissection repair. Small amount of fluid surrounding the ascending aorta without evidence of enhancing wall to suggest abscess.  RUE duplex on 9/12: There is a thrombosed and occluded RIJ   Invasive hemodynamic monitoring, assess perfusion indices   Afib /MAP 65/ Hct 38.2/ Lactate 1.8  Blood pressure management with Lopressor, also started home Lisinopril   [x] Temporary pacing - VVI paced at 50 stand by mode  Volume: [x] Albumin x1  Continuos reassessment of hemodynamics   Mexiletine for hx of afib   [x] AC therapy with Eliquis     Serial EKG and cardiac enzymes         ***Pulmonary***  CT Chest on 9/12: Bilateral lower lobe atelectasis with bilateral pleural effusions   Reintubated for change in mental status on 9/8, self extubated on 9/9 and reintubated again, extubated to HFO2 on 9/13, now sating on room air in 90s  Encourage incentive spirometry, continue pulse ox monitoring, follow ABGs   Hx of COPD / Continue bronchodilators and Prednisone   Monitor secretions and suction PRN                   ***GI***  CT A/P on 9/12: Mild thickening of the cecum and ascending colon possibly representing mild nonspecific proximal colitis. Hepatic cirrhosis. The focal IPMN of the pancreas with large cyst within the tail the pancreas measuring 3.1 cm.  Colostomy bag in place, continue to monitor output   [x]  Diet: Tolerating PO consistent carb diet per swallow, however eating less than 50% of tray due to mood  [x] Protonix       ***Renal***  Continue to monitor I/Os, BUN/Creatinine.   Replete lytes PRN  Amezcua present  Continue diuresis with Lasix       ***ID***  SCx on 9/8 +Proteus mirabilis   UA on 9/9+ LE, WBC 60, few yeast  /  UCx on 9/10 NG   Rocephin for Empiric Dosing  BCx on 9/10 NGTD, BCx on 9/12 NGTD   Nystatin for thrush   Diflucan for Yeast found in Urine   CTX  Plan to restart Meropenem     ***Endocrine***  [x]  DM : HbA1c 9.4%                - [x] Insulin gtt                - Need tight glycemic control to prevent wound infection.          Patient requires continuous monitoring with bedside rhythm monitoring, pulse oximetry monitoring, and continuous central venous and arterial pressure monitoring; and intermittent blood gas analysis. Care plan discussed with the ICU care team.   Patient remained critical, at risk for life threatening decompensation.    I have spent 35 minutes providing critical care management to this patient.    By signing my name below, I, Kieran Plascencia, attest that this documentation has been prepared under the direction and in the presence of KIANA Issa   Electronically signed: Georgi Cordero, 09-16-22 @ 08:47    I, KIANA Issa, personally performed the services described in this documentation. all medical record entries made by the scribe were at my direction and in my presence. I have reviewed the chart and agree that the record reflects my personal performance and is accurate and complete  Electronically signed: KIANA Issa    Patient seen and examined at the bedside.    Remained critically ill on continuous ICU monitoring.    OBJECTIVE:  Vital Signs Last 24 Hrs  T(C): 36 (16 Sep 2022 08:00), Max: 38.6 (16 Sep 2022 02:00)  T(F): 96.8 (16 Sep 2022 08:00), Max: 101.5 (16 Sep 2022 02:00)  HR: 85 (16 Sep 2022 08:00) (77 - 112)  BP: 102/86 (16 Sep 2022 08:00) (102/86 - 158/83)  BP(mean): 93 (16 Sep 2022 08:00) (72 - 102)  RR: 28 (16 Sep 2022 08:00) (14 - 28)  SpO2: 97% (16 Sep 2022 08:00) (93% - 100%)    Parameters below as of 16 Sep 2022 08:00  Patient On (Oxygen Delivery Method): room air          Physical Exam:   General: obese, elderly female, OOBTC, NAD  Neurology: sedated, but opens eyes, not following commands  Eyes: right pupil reactive to light, left surgical pupil  ENT/Neck: Neck supple, trachea midline, No JVD, +ETT midline   Respiratory: Clear bilaterally   CV: S1S2, no murmurs        [x] Sternal dressing        [x] Afib,  [x] Temporary pacing, - VVI 50  Abdominal: Soft, NT, ND, colostomy in place  Extremities: 1+ pedal edema noted, + peripheral pulses   Skin: Dry dressing over right heel, no Rashes, Hematoma, Ecchymosis                                 Assessment:  71F PMH DM, COPD, Chronic Adrenal Insufficiency on Chronic prednisone, history of colorectal cancer s/p resection (colostomy bag), Hx of CAD, Chronic A fib on Eliquis, and tracheomalacia and multiple intubations, recent dx of OM presented to ED at [facility] this morning with epigastric pain, belching and central chest pain. Found on CTA to have type A aortic dissection and transferred to [facility] for surgical evaluation by Dr. Cabrera.     Ascending aortic dissection s/p modified bentall and hemiarch on 9/6   Hypovolemic shock   Post op respiratory insufficiency  Acute blood loss anemia  Stress hyperglycemia   RIJ thrombus  Pancreatic cyst    Proteus PNA        Plan:   ***Neuro***  [x] Nonfocal   Episode of delerium yesterday, given low dose of precedex > dc'd at this time  Post operative neuro assessment   Started on Seroquel last night, improvement in mental status today  Infectious workup for delirium        ***Cardiovascular***  CT Chest on 9/12:  Status post recent ascending aortic dissection repair. Small amount of fluid surrounding the ascending aorta without evidence of enhancing wall to suggest abscess.  RUE duplex on 9/12: There is a thrombosed and occluded RIJ   Invasive hemodynamic monitoring, assess perfusion indices   Afib /MAP 65/ Hct 38.2/ Lactate 1.8  Blood pressure management with Lopressor, also started home Lisinopril   [x] Temporary pacing - VVI paced at 50 stand by mode  Volume: [x] Albumin x1  Continuos reassessment of hemodynamics   Mexiletine for hx of afib   [x] AC therapy with Eliquis     Serial EKG and cardiac enzymes   BP slightly lower today, will dc lisinopril and evaluate BB        ***Pulmonary***  CT Chest on 9/12: Bilateral lower lobe atelectasis with bilateral pleural effusions   Reintubated for change in mental status on 9/8, self extubated on 9/9 and reintubated again, extubated to HFO2 on 9/13, now sating on room air in 90s  Encourage incentive spirometry, continue pulse ox monitoring, follow ABGs   Hx of COPD / Continue bronchodilators and Prednisone   Monitor secretions and suction PRN                     ***GI***  CT A/P on 9/12: Mild thickening of the cecum and ascending colon possibly representing mild nonspecific proximal colitis. Hepatic cirrhosis. The focal IPMN of the pancreas with large cyst within the tail the pancreas measuring 3.1 cm.  Colostomy bag in place, continue to monitor output   [x]  Diet: Tolerating PO consistent carb diet per swallow, however eating less than 50% of tray due to mood > rec/sp SLP to see today, recc strict NPO > will resume TF  [x] Protonix       ***Renal***  Continue to monitor I/Os, BUN/Creatinine.   Replete lytes PRN  Amezcua present  Continue diuresis with Lasix       ***ID***  SCx on 9/8 +Proteus mirabilis   UA on 9/9+ LE, WBC 60, few yeast  /  UCx on 9/10 NG   Rocephin for Empiric Dosing  BCx on 9/10 NGTD, BCx on 9/12 NGTD   Nystatin for thrush   Diflucan added OVN for Yeast found in Urine   On CTX  Plan to restart Meropenem, will add IV vanco    ***Endocrine***  [x]  DM : HbA1c 9.4%                - [x] Insulin gtt                - Need tight glycemic control to prevent wound infection.          Patient requires continuous monitoring with bedside rhythm monitoring, pulse oximetry monitoring, and continuous central venous and arterial pressure monitoring; and intermittent blood gas analysis. Care plan discussed with the ICU care team.   Patient remained critical, at risk for life threatening decompensation.    I have spent 35 minutes providing critical care management to this patient.    By signing my name below, I, Kieran Plascencia, attest that this documentation has been prepared under the direction and in the presence of KIANA Issa   Electronically signed: Georgi Cordero, 09-16-22 @ 08:47    I, KIANA Issa, personally performed the services described in this documentation. all medical record entries made by the scribe were at my direction and in my presence. I have reviewed the chart and agree that the record reflects my personal performance and is accurate and complete  Electronically signed: KIANA Issa

## 2022-09-16 NOTE — PROGRESS NOTE ADULT - TIME BILLING
as above: low grade temp, remains in PICU  multifactorial dyspnea-resp failure--severe persistent asthma, TBM s/p tracheoplasty, s/p Aortic aneurysm repair, Bronchitis (proteus)-O2 NC-keep 90%  severe persistent asthma--medrol 8mg, singulair 10, duoneb q 6, budes .5 bid, incentive spirometry, tezspire 8/29-next 9/29  TBM-s/p tracheoplasty--accapella, unable to use vest due to surgery  s/p Aortic aneurysm repair--as per [facility]TS staff care  AF-on heparin--eventual eliquis rx  DVT R-IJ-on heparin rx  ID-proteus bronchitisi-meropenum changed to ceftriaxone as per ID-to complete 9/18--new temp-fever w/up in progress (9/16)  PT-OOB as able         VC dysfunction--aspiration precautions-sp and sw evaln--NGT in place  GOC                                    Heme onc f/up colon ca  prog--guarded in CTU    Eulalio Allison MD-Pulmonary   [phone]

## 2022-09-16 NOTE — PROGRESS NOTE ADULT - SUBJECTIVE AND OBJECTIVE BOX
CHIEF COMPLAINT: f/up sob, chronic resp failure, TBM, severe persistent asthma, VC dysfunction, Type A aortic dissection s/p repair w/modified "Bentall procedure and hemiarch replacement -no signif sob or protracted cough, just weakness    Interval Events: low grade temp-yeast in urine, NGT in place    REVIEW OF SYSTEMS:  Constitutional: No fevers or chills. No weight loss. + fatigue or generalized malaise.  Eyes: No itching or discharge from the eyes  ENT: No ear pain. No ear discharge. No nasal congestion. No post nasal drip. No epistaxis. No throat pain. No sore throat. No difficulty swallowing.   CV: No chest pain. No palpitations. No lightheadedness or dizziness.   Resp: No dyspnea at rest. + dyspnea on exertion. No orthopnea. No wheezing. No cough. No stridor. No sputum production. No chest pain with respiration.  GI: No nausea. No vomiting. No diarrhea.  MSK: No joint pain or pain in any extremities  Integumentary: No skin lesions. No pedal edema.  Neurological: +gross motor weakness. No sensory changes.  [ +] All other systems negative  [ ] Unable to assess ROS because ________    OBJECTIVE:  ICU Vital Signs Last 24 Hrs  T(C): 37.9 (16 Sep 2022 04:00), Max: 38.6 (16 Sep 2022 02:00)  T(F): 100.2 (16 Sep 2022 04:00), Max: 101.5 (16 Sep 2022 02:00)  HR: 106 (16 Sep 2022 04:00) (77 - 112)  BP: 112/62 (16 Sep 2022 02:00) (104/58 - 158/83)  BP(mean): 77 (16 Sep 2022 02:00) (72 - 102)  ABP: 134/66 (16 Sep 2022 04:00) (105/51 - 187/79)  ABP(mean): 89 (16 Sep 2022 04:00) (66 - 114)  RR: 27 (16 Sep 2022 04:00) (14 - 28)  SpO2: 100% (16 Sep 2022 04:00) (93% - 100%)    O2 Parameters below as of 16 Sep 2022 04:00  Patient On (Oxygen Delivery Method): room air               @ 07:01  -  09-15 @ 07:00  --------------------------------------------------------  IN: 1057.5 mL / OUT: 4750 mL / NET: -3692.5 mL    09-15 @ 07: @ 04:49  --------------------------------------------------------  IN: 568 mL / OUT: 2610 mL / NET: -2042 mL      CAPILLARY BLOOD GLUCOSE  174 (16 Sep 2022 04:00)      POCT Blood Glucose.: 174 mg/dL (16 Sep 2022 04:03)      PHYSICAL EXAM: NAD in bed on NC, NGT in place  General: Awake, alert, oriented X 3.   HEENT: Atraumatic, normocephalic.                 Mallampatti Grade 3                No nasal congestion.                No tonsillar or pharyngeal exudates.  Lymph Nodes: No palpable lymphadenopathy  Neck: No JVD. No carotid bruit.   Respiratory: Nabnormal chest expansion                         Normal percussion                         Normal and equal air entry                         No wheeze, rhonchi or rales.  Cardiovascular: S1 S2 normal. No murmurs, rubs or gallops.   Abdomen: Soft, non-tender, non-distended. No organomegaly. Normoactive bowel sounds.  Extremities: Warm to touch. Peripheral pulse palpable. No pedal edema.   Skin: No rashes or skin lesions  Neurological: Motor and sensory examination equal and normal in all four extremities.  Psychiatry: Appropriate mood and affect.    HOSPITAL MEDICATIONS:  MEDICATIONS  (STANDING):  albuterol/ipratropium for Nebulization 3 milliLiter(s) Nebulizer every 6 hours  apixaban 5 milliGRAM(s) Oral every 12 hours  buDESOnide    Inhalation Suspension 0.5 milliGRAM(s) Inhalation every 12 hours  cefTRIAXone   IVPB 1000 milliGRAM(s) IV Intermittent every 24 hours  chlorhexidine 0.12% Liquid 5 milliLiter(s) Oral Mucosa two times a day  chlorhexidine 2% Cloths 1 Application(s) Topical <User Schedule>  collagenase Ointment 1 Application(s) Topical daily  fluconAZOLE IVPB 200 milliGRAM(s) IV Intermittent every 24 hours  furosemide    Tablet 20 milliGRAM(s) Oral daily  insulin regular Infusion 3 Unit(s)/Hr (3 mL/Hr) IV Continuous <Continuous>  lisinopril 2.5 milliGRAM(s) Oral daily  metoprolol tartrate 50 milliGRAM(s) Oral every 8 hours  mexiletine 200 milliGRAM(s) Oral every 8 hours  montelukast 10 milliGRAM(s) Oral at bedtime  multivitamin 1 Tablet(s) Oral daily  nystatin    Suspension 223855 Unit(s) Oral every 6 hours  pantoprazole  Injectable 40 milliGRAM(s) IV Push daily  predniSONE   Tablet 8 milliGRAM(s) Oral daily  QUEtiapine 25 milliGRAM(s) Oral at bedtime  spironolactone 25 milliGRAM(s) Oral every 12 hours    MEDICATIONS  (PRN):      LABS:                        11.5   22.36 )-----------( 302      ( 16 Sep 2022 00:37 )             38.2     -    134<L>  |  101  |  23  ----------------------------<  148<H>  4.4   |  24  |  0.72    Ca    8.5      16 Sep 2022 00:37  Phos  3.0       Mg     2.1         TPro  6.1  /  Alb  3.1<L>  /  TBili  0.4  /  DBili  x   /  AST  31  /  ALT  226<H>  /  AlkPhos  124<H>      PT/INR - ( 16 Sep 2022 00:37 )   PT: 16.8 sec;   INR: 1.44 ratio         PTT - ( 16 Sep 2022 00:37 )  PTT:28.3 sec  Urinalysis Basic - ( 16 Sep 2022 03:19 )    Color: Yellow / Appearance: Slightly Turbid / S.018 / pH: x  Gluc: x / Ketone: Small  / Bili: Negative / Urobili: Negative   Blood: x / Protein: 30 mg/dL / Nitrite: Negative   Leuk Esterase: Large / RBC: 8 /hpf / WBC 95 /HPF   Sq Epi: x / Non Sq Epi: 0 /hpf / Bacteria: Negative      Arterial Blood Gas:   @ 00:20  7.51/34/79/27/95.0/4.2  ABG lactate: --  Arterial Blood Gas:  09-15 @ 15:48  7.52/35/86/29/96.7/5.6  ABG lactate: --  Arterial Blood Gas:  09-15 @ 08:20  7.50/41/95/32/97.5/8.1  ABG lactate: --  Arterial Blood Gas:  09-15 @ 00:05  7.47/40/85/29/97.0/5.0  ABG lactate: --        MICROBIOLOGY:     RADIOLOGY:  [ ] Reviewed and interpreted by me    Point of Care Ultrasound Findings:    PFT:    EKG:

## 2022-09-17 DIAGNOSIS — E11.65 TYPE 2 DIABETES MELLITUS WITH HYPERGLYCEMIA: ICD-10-CM

## 2022-09-17 DIAGNOSIS — I10 ESSENTIAL (PRIMARY) HYPERTENSION: ICD-10-CM

## 2022-09-17 DIAGNOSIS — E78.5 HYPERLIPIDEMIA, UNSPECIFIED: ICD-10-CM

## 2022-09-17 LAB
ALBUMIN SERPL ELPH-MCNC: 3.1 G/DL — LOW (ref 3.3–5)
ALP SERPL-CCNC: 104 U/L — SIGNIFICANT CHANGE UP (ref 40–120)
ALT FLD-CCNC: 120 U/L — HIGH (ref 10–45)
ANION GAP SERPL CALC-SCNC: 11 MMOL/L — SIGNIFICANT CHANGE UP (ref 5–17)
APTT BLD: 30.3 SEC — SIGNIFICANT CHANGE UP (ref 27.5–35.5)
AST SERPL-CCNC: 24 U/L — SIGNIFICANT CHANGE UP (ref 10–40)
BILIRUB SERPL-MCNC: 0.4 MG/DL — SIGNIFICANT CHANGE UP (ref 0.2–1.2)
BUN SERPL-MCNC: 22 MG/DL — SIGNIFICANT CHANGE UP (ref 7–23)
CALCIUM SERPL-MCNC: 8.7 MG/DL — SIGNIFICANT CHANGE UP (ref 8.4–10.5)
CHLORIDE SERPL-SCNC: 100 MMOL/L — SIGNIFICANT CHANGE UP (ref 96–108)
CO2 SERPL-SCNC: 24 MMOL/L — SIGNIFICANT CHANGE UP (ref 22–31)
CREAT SERPL-MCNC: 0.76 MG/DL — SIGNIFICANT CHANGE UP (ref 0.5–1.3)
CULTURE RESULTS: SIGNIFICANT CHANGE UP
CULTURE RESULTS: SIGNIFICANT CHANGE UP
EGFR: 83 ML/MIN/1.73M2 — SIGNIFICANT CHANGE UP
GAS PNL BLDA: SIGNIFICANT CHANGE UP
GLUCOSE BLDC GLUCOMTR-MCNC: 106 MG/DL — HIGH (ref 70–99)
GLUCOSE BLDC GLUCOMTR-MCNC: 112 MG/DL — HIGH (ref 70–99)
GLUCOSE BLDC GLUCOMTR-MCNC: 121 MG/DL — HIGH (ref 70–99)
GLUCOSE BLDC GLUCOMTR-MCNC: 122 MG/DL — HIGH (ref 70–99)
GLUCOSE BLDC GLUCOMTR-MCNC: 147 MG/DL — HIGH (ref 70–99)
GLUCOSE BLDC GLUCOMTR-MCNC: 150 MG/DL — HIGH (ref 70–99)
GLUCOSE BLDC GLUCOMTR-MCNC: 162 MG/DL — HIGH (ref 70–99)
GLUCOSE BLDC GLUCOMTR-MCNC: 164 MG/DL — HIGH (ref 70–99)
GLUCOSE BLDC GLUCOMTR-MCNC: 164 MG/DL — HIGH (ref 70–99)
GLUCOSE BLDC GLUCOMTR-MCNC: 166 MG/DL — HIGH (ref 70–99)
GLUCOSE BLDC GLUCOMTR-MCNC: 166 MG/DL — HIGH (ref 70–99)
GLUCOSE BLDC GLUCOMTR-MCNC: 182 MG/DL — HIGH (ref 70–99)
GLUCOSE BLDC GLUCOMTR-MCNC: 197 MG/DL — HIGH (ref 70–99)
GLUCOSE BLDC GLUCOMTR-MCNC: 199 MG/DL — HIGH (ref 70–99)
GLUCOSE BLDC GLUCOMTR-MCNC: 209 MG/DL — HIGH (ref 70–99)
GLUCOSE BLDC GLUCOMTR-MCNC: 228 MG/DL — HIGH (ref 70–99)
GLUCOSE BLDC GLUCOMTR-MCNC: 247 MG/DL — HIGH (ref 70–99)
GLUCOSE BLDC GLUCOMTR-MCNC: 275 MG/DL — HIGH (ref 70–99)
GLUCOSE BLDC GLUCOMTR-MCNC: 286 MG/DL — HIGH (ref 70–99)
GLUCOSE BLDC GLUCOMTR-MCNC: 300 MG/DL — HIGH (ref 70–99)
GLUCOSE BLDC GLUCOMTR-MCNC: 304 MG/DL — HIGH (ref 70–99)
GLUCOSE BLDC GLUCOMTR-MCNC: 307 MG/DL — HIGH (ref 70–99)
GLUCOSE SERPL-MCNC: 163 MG/DL — HIGH (ref 70–99)
HCT VFR BLD CALC: 34.7 % — SIGNIFICANT CHANGE UP (ref 34.5–45)
HGB BLD-MCNC: 10.6 G/DL — LOW (ref 11.5–15.5)
INR BLD: 1.82 RATIO — HIGH (ref 0.88–1.16)
MAGNESIUM SERPL-MCNC: 2 MG/DL — SIGNIFICANT CHANGE UP (ref 1.6–2.6)
MCHC RBC-ENTMCNC: 23.5 PG — LOW (ref 27–34)
MCHC RBC-ENTMCNC: 30.5 GM/DL — LOW (ref 32–36)
MCV RBC AUTO: 76.9 FL — LOW (ref 80–100)
MRSA PCR RESULT.: SIGNIFICANT CHANGE UP
NRBC # BLD: 3 /100 WBCS — HIGH (ref 0–0)
PHOSPHATE SERPL-MCNC: 3.4 MG/DL — SIGNIFICANT CHANGE UP (ref 2.5–4.5)
PLATELET # BLD AUTO: 346 K/UL — SIGNIFICANT CHANGE UP (ref 150–400)
POTASSIUM SERPL-MCNC: 4.5 MMOL/L — SIGNIFICANT CHANGE UP (ref 3.5–5.3)
POTASSIUM SERPL-SCNC: 4.5 MMOL/L — SIGNIFICANT CHANGE UP (ref 3.5–5.3)
PROT SERPL-MCNC: 6.1 G/DL — SIGNIFICANT CHANGE UP (ref 6–8.3)
PROTHROM AB SERPL-ACNC: 21 SEC — HIGH (ref 10.5–13.4)
RBC # BLD: 4.51 M/UL — SIGNIFICANT CHANGE UP (ref 3.8–5.2)
RBC # FLD: SIGNIFICANT CHANGE UP (ref 10.3–14.5)
S AUREUS DNA NOSE QL NAA+PROBE: SIGNIFICANT CHANGE UP
SODIUM SERPL-SCNC: 135 MMOL/L — SIGNIFICANT CHANGE UP (ref 135–145)
SPECIMEN SOURCE: SIGNIFICANT CHANGE UP
SPECIMEN SOURCE: SIGNIFICANT CHANGE UP
VANCOMYCIN TROUGH SERPL-MCNC: 14.5 UG/ML — SIGNIFICANT CHANGE UP (ref 10–20)
VANCOMYCIN TROUGH SERPL-MCNC: 9.2 UG/ML — LOW (ref 10–20)
WBC # BLD: 28.01 K/UL — HIGH (ref 3.8–10.5)
WBC # FLD AUTO: 28.01 K/UL — HIGH (ref 3.8–10.5)

## 2022-09-17 PROCEDURE — 99232 SBSQ HOSP IP/OBS MODERATE 35: CPT

## 2022-09-17 PROCEDURE — 99291 CRITICAL CARE FIRST HOUR: CPT | Mod: 24

## 2022-09-17 PROCEDURE — 71045 X-RAY EXAM CHEST 1 VIEW: CPT | Mod: 26

## 2022-09-17 RX ORDER — VANCOMYCIN HCL 1 G
1000 VIAL (EA) INTRAVENOUS EVERY 12 HOURS
Refills: 0 | Status: DISCONTINUED | OUTPATIENT
Start: 2022-09-17 | End: 2022-09-19

## 2022-09-17 RX ORDER — HUMAN INSULIN 100 [IU]/ML
15 INJECTION, SUSPENSION SUBCUTANEOUS EVERY 6 HOURS
Refills: 0 | Status: DISCONTINUED | OUTPATIENT
Start: 2022-09-17 | End: 2022-09-18

## 2022-09-17 RX ORDER — ALBUMIN HUMAN 25 %
250 VIAL (ML) INTRAVENOUS ONCE
Refills: 0 | Status: COMPLETED | OUTPATIENT
Start: 2022-09-17 | End: 2022-09-17

## 2022-09-17 RX ORDER — INSULIN LISPRO 100/ML
VIAL (ML) SUBCUTANEOUS EVERY 6 HOURS
Refills: 0 | Status: DISCONTINUED | OUTPATIENT
Start: 2022-09-17 | End: 2022-09-18

## 2022-09-17 RX ADMIN — SPIRONOLACTONE 25 MILLIGRAM(S): 25 TABLET, FILM COATED ORAL at 05:47

## 2022-09-17 RX ADMIN — Medication 3 MILLILITER(S): at 09:23

## 2022-09-17 RX ADMIN — Medication 2: at 17:08

## 2022-09-17 RX ADMIN — Medication 500000 UNIT(S): at 11:39

## 2022-09-17 RX ADMIN — Medication 1 APPLICATION(S): at 11:38

## 2022-09-17 RX ADMIN — FLUCONAZOLE 100 MILLIGRAM(S): 150 TABLET ORAL at 04:34

## 2022-09-17 RX ADMIN — HUMAN INSULIN 15 UNIT(S): 100 INJECTION, SUSPENSION SUBCUTANEOUS at 13:04

## 2022-09-17 RX ADMIN — APIXABAN 5 MILLIGRAM(S): 2.5 TABLET, FILM COATED ORAL at 11:38

## 2022-09-17 RX ADMIN — Medication 3 MILLILITER(S): at 23:09

## 2022-09-17 RX ADMIN — Medication 25 MILLIGRAM(S): at 21:32

## 2022-09-17 RX ADMIN — HUMAN INSULIN 15 UNIT(S): 100 INJECTION, SUSPENSION SUBCUTANEOUS at 17:09

## 2022-09-17 RX ADMIN — CHLORHEXIDINE GLUCONATE 1 APPLICATION(S): 213 SOLUTION TOPICAL at 05:37

## 2022-09-17 RX ADMIN — PANTOPRAZOLE SODIUM 40 MILLIGRAM(S): 20 TABLET, DELAYED RELEASE ORAL at 11:38

## 2022-09-17 RX ADMIN — QUETIAPINE FUMARATE 25 MILLIGRAM(S): 200 TABLET, FILM COATED ORAL at 21:33

## 2022-09-17 RX ADMIN — Medication 3 MILLILITER(S): at 06:44

## 2022-09-17 RX ADMIN — Medication 250 MILLIGRAM(S): at 00:38

## 2022-09-17 RX ADMIN — MEROPENEM 100 MILLIGRAM(S): 1 INJECTION INTRAVENOUS at 14:14

## 2022-09-17 RX ADMIN — MONTELUKAST 10 MILLIGRAM(S): 4 TABLET, CHEWABLE ORAL at 21:33

## 2022-09-17 RX ADMIN — Medication 3 MILLILITER(S): at 00:37

## 2022-09-17 RX ADMIN — Medication 125 MILLILITER(S): at 01:25

## 2022-09-17 RX ADMIN — APIXABAN 5 MILLIGRAM(S): 2.5 TABLET, FILM COATED ORAL at 22:42

## 2022-09-17 RX ADMIN — Medication 25 MILLIGRAM(S): at 13:05

## 2022-09-17 RX ADMIN — Medication 25 MILLIGRAM(S): at 05:47

## 2022-09-17 RX ADMIN — MEROPENEM 100 MILLIGRAM(S): 1 INJECTION INTRAVENOUS at 05:45

## 2022-09-17 RX ADMIN — Medication 1 TABLET(S): at 11:38

## 2022-09-17 RX ADMIN — MEXILETINE HYDROCHLORIDE 200 MILLIGRAM(S): 150 CAPSULE ORAL at 21:31

## 2022-09-17 RX ADMIN — MEXILETINE HYDROCHLORIDE 200 MILLIGRAM(S): 150 CAPSULE ORAL at 05:49

## 2022-09-17 RX ADMIN — Medication 20 MILLIGRAM(S): at 05:48

## 2022-09-17 RX ADMIN — MEROPENEM 100 MILLIGRAM(S): 1 INJECTION INTRAVENOUS at 21:33

## 2022-09-17 RX ADMIN — MEXILETINE HYDROCHLORIDE 200 MILLIGRAM(S): 150 CAPSULE ORAL at 13:05

## 2022-09-17 RX ADMIN — Medication 250 MILLIGRAM(S): at 22:42

## 2022-09-17 RX ADMIN — INSULIN HUMAN 3 UNIT(S)/HR: 100 INJECTION, SOLUTION SUBCUTANEOUS at 20:00

## 2022-09-17 RX ADMIN — Medication 8 MILLIGRAM(S): at 05:48

## 2022-09-17 RX ADMIN — Medication 250 MILLIGRAM(S): at 11:39

## 2022-09-17 RX ADMIN — INSULIN HUMAN 3 UNIT(S)/HR: 100 INJECTION, SOLUTION SUBCUTANEOUS at 11:39

## 2022-09-17 RX ADMIN — Medication 0.5 MILLIGRAM(S): at 06:44

## 2022-09-17 NOTE — PROGRESS NOTE ADULT - PROBLEM SELECTOR PLAN 1
-test BG hourly while on insulin gtt, transition to q6h once off gtt  -Start NPH 15 units Q6h (hold if TF off). May need to be titrated up based on FS values over next 24 hours. Continue to monitor.   -Can stop gtt once BG at goal or 2 hours after NPH given.   -Start Admelog moderate correction scale Q6h  -Notify endocrine team for any change to TF/feeding modality  Discharge plan:  - Likely to discharge patient home on basal/bolus insulin. Final regimen pending clinical course.  - Recommend routine outpatient ophthalmology, podiatry and endocrinology f/u. Can f/u with endocrinologist Dr. Saavedra.

## 2022-09-17 NOTE — PROGRESS NOTE ADULT - ASSESSMENT
71F PMH DM2, COPD, secondary Adrenal Insufficiency on Chronic steroids, history of colorectal cancer s/p resection (colostomy bag), ? Hx of CAD, Chronic A fib on Eliquis, and tracheomalacia and multiple intubations, recent dx of OM presented to ED at [facility] with epigastric pain, belching and central chest pain. Found on CTA to have type A aortic dissection and transferred to [facility] for surgical evaluation by Dr. Cabrera. Now s/p aortic dissection repair on 9/07/22 . Endocrine consulted for assistance with transition from insulin drip to subcutaneous insulin.

## 2022-09-17 NOTE — PROGRESS NOTE ADULT - SUBJECTIVE AND OBJECTIVE BOX
Diabetes Follow up note:    Chief complaint: T2DM w/steroid induced hyperglycemia    Interval Hx: Pt received 8 units of NPH at midnight but had to be restarted on gtt when glucose spiked to 300s. Pt seen at bedside. Currently on 5 units/hr tolerating TF at goal rate. Pt w/out complaints.     Review of Systems:  General: denies pain  GI: Tolerating TFs. Denies N/V/D/Abd pain  CV: Denies CP/SOB  ENDO: No S&Sx of hypoglycemia    MEDS:  insulin lispro (ADMELOG) corrective regimen sliding scale   SubCutaneous every 6 hours  insulin NPH human recombinant 15 Unit(s) SubCutaneous every 6 hours  insulin regular Infusion 3 Unit(s)/Hr IV Continuous <Continuous>  predniSONE   Tablet 8 milliGRAM(s) Oral daily    fluconAZOLE IVPB 200 milliGRAM(s) IV Intermittent every 24 hours  meropenem  IVPB 1000 milliGRAM(s) IV Intermittent every 8 hours  nystatin    Suspension 138647 Unit(s) Oral every 6 hours  vancomycin  IVPB 1000 milliGRAM(s) IV Intermittent every 12 hours    Allergies    aspirin (Short breath)  Avelox (Short breath; Pruritus)  cefepime (Anaphylaxis)  codeine (Short breath)  Dilaudid (Short breath)  iodine (Short breath; Swelling)  penicillin (Anaphylaxis)  shellfish (Anaphylaxis)  tetanus toxoid (Short breath)  Valium (Short breath)        PE:  General: Female lying in bed. NAD.   Vital Signs Last 24 Hrs  T(C): 36.4 (17 Sep 2022 08:00), Max: 37.8 (17 Sep 2022 00:00)  T(F): 97.6 (17 Sep 2022 08:00), Max: 100.1 (17 Sep 2022 00:00)  HR: 99 (17 Sep 2022 11:00) (92 - 107)  BP: 142/73 (17 Sep 2022 07:00) (97/54 - 152/82)  BP(mean): 90 (17 Sep 2022 07:00) (68 - 103)  RR: 18 (17 Sep 2022 11:00) (10 - 37)  SpO2: 96% (17 Sep 2022 11:00) (94% - 100%)    Parameters below as of 17 Sep 2022 09:24  Patient On (Oxygen Delivery Method): room air    HEENT: NGT in place.   Abd: Soft, NT,ND,   Extremities: Warm  Neuro: Alert. Able to answer questions.     LABS:  POCT Blood Glucose.: 228 mg/dL (09-17-22 @ 11:02)  POCT Blood Glucose.: 197 mg/dL (09-17-22 @ 09:59)  POCT Blood Glucose.: 275 mg/dL (09-17-22 @ 08:48)  POCT Blood Glucose.: 286 mg/dL (09-17-22 @ 07:51)  POCT Blood Glucose.: 304 mg/dL (09-17-22 @ 06:46)  POCT Blood Glucose.: 247 mg/dL (09-17-22 @ 05:42)  POCT Blood Glucose.: 307 mg/dL (09-17-22 @ 04:18)  POCT Blood Glucose.: 300 mg/dL (09-17-22 @ 03:06)  POCT Blood Glucose.: 147 mg/dL (09-17-22 @ 01:25)  POCT Blood Glucose.: 166 mg/dL (09-17-22 @ 00:29)  POCT Blood Glucose.: 152 mg/dL (09-16-22 @ 23:01)  POCT Blood Glucose.: 162 mg/dL (09-16-22 @ 22:12)  POCT Blood Glucose.: 158 mg/dL (09-16-22 @ 21:22)  POCT Blood Glucose.: 176 mg/dL (09-16-22 @ 20:13)  POCT Blood Glucose.: 186 mg/dL (09-16-22 @ 18:48)  POCT Blood Glucose.: 181 mg/dL (09-16-22 @ 17:48)  POCT Blood Glucose.: 167 mg/dL (09-16-22 @ 15:49)  POCT Blood Glucose.: 142 mg/dL (09-16-22 @ 14:50)  POCT Blood Glucose.: 119 mg/dL (09-16-22 @ 13:47)  POCT Blood Glucose.: 98 mg/dL (09-16-22 @ 12:59)  POCT Blood Glucose.: 120 mg/dL (09-16-22 @ 12:16)  POCT Blood Glucose.: 171 mg/dL (09-16-22 @ 11:13)  POCT Blood Glucose.: 166 mg/dL (09-16-22 @ 10:30)  POCT Blood Glucose.: 115 mg/dL (09-16-22 @ 10:11)  POCT Blood Glucose.: 175 mg/dL (09-16-22 @ 08:50)  POCT Blood Glucose.: 159 mg/dL (09-16-22 @ 08:03)  POCT Blood Glucose.: 170 mg/dL (09-16-22 @ 07:04)  POCT Blood Glucose.: 175 mg/dL (09-16-22 @ 06:24)  POCT Blood Glucose.: 100 mg/dL (09-16-22 @ 05:09)  POCT Blood Glucose.: 174 mg/dL (09-16-22 @ 04:03)  POCT Blood Glucose.: 156 mg/dL (09-16-22 @ 03:17)  POCT Blood Glucose.: 144 mg/dL (09-16-22 @ 01:40)  POCT Blood Glucose.: 136 mg/dL (09-16-22 @ 00:27)  POCT Blood Glucose.: 186 mg/dL (09-15-22 @ 23:06)  POCT Blood Glucose.: 190 mg/dL (09-15-22 @ 22:28)  POCT Blood Glucose.: 155 mg/dL (09-15-22 @ 18:55)  POCT Blood Glucose.: 137 mg/dL (09-15-22 @ 17:58)  POCT Blood Glucose.: 123 mg/dL (09-15-22 @ 17:02)  POCT Blood Glucose.: 296 mg/dL (09-15-22 @ 15:29)  POCT Blood Glucose.: 40 mg/dL (09-15-22 @ 15:22)  POCT Blood Glucose.: 38 mg/dL (09-15-22 @ 15:20)  POCT Blood Glucose.: 157 mg/dL (09-15-22 @ 13:12)  POCT Blood Glucose.: 145 mg/dL (09-15-22 @ 11:07)  POCT Blood Glucose.: 185 mg/dL (09-15-22 @ 09:48)  POCT Blood Glucose.: 156 mg/dL (09-15-22 @ 08:47)  POCT Blood Glucose.: 129 mg/dL (09-15-22 @ 07:52)  POCT Blood Glucose.: 171 mg/dL (09-15-22 @ 06:52)  POCT Blood Glucose.: 151 mg/dL (09-15-22 @ 06:03)  POCT Blood Glucose.: 95 mg/dL (09-15-22 @ 04:47)  POCT Blood Glucose.: 103 mg/dL (09-15-22 @ 03:55)  POCT Blood Glucose.: 134 mg/dL (09-15-22 @ 02:45)  POCT Blood Glucose.: 138 mg/dL (09-15-22 @ 02:08)  POCT Blood Glucose.: 173 mg/dL (09-15-22 @ 01:17)  POCT Blood Glucose.: 139 mg/dL (09-14-22 @ 23:14)  POCT Blood Glucose.: 104 mg/dL (09-14-22 @ 22:00)  POCT Blood Glucose.: 91 mg/dL (09-14-22 @ 21:09)  POCT Blood Glucose.: 110 mg/dL (09-14-22 @ 20:10)  POCT Blood Glucose.: 132 mg/dL (09-14-22 @ 19:01)  POCT Blood Glucose.: 150 mg/dL (09-14-22 @ 17:44)  POCT Blood Glucose.: 206 mg/dL (09-14-22 @ 17:07)  POCT Blood Glucose.: 176 mg/dL (09-14-22 @ 16:21)  POCT Blood Glucose.: 220 mg/dL (09-14-22 @ 15:12)  POCT Blood Glucose.: 216 mg/dL (09-14-22 @ 13:53)  POCT Blood Glucose.: 188 mg/dL (09-14-22 @ 12:57)  POCT Blood Glucose.: 92 mg/dL (09-14-22 @ 11:59)                            10.6   28.01 )-----------( 346      ( 17 Sep 2022 01:08 )             34.7       09-17    135  |  100  |  22  ----------------------------<  163<H>  4.5   |  24  |  0.76    eGFR: 83    Ca    8.7      09-17  Mg     2.0     09-17  Phos  3.4     09-17    TPro  6.1  /  Alb  3.1<L>  /  TBili  0.4  /  DBili  x   /  AST  24  /  ALT  120<H>  /  AlkPhos  104  09-17      Thyroid Function Tests:  09-06 @ 16:46 TSH 3.30 FreeT4 -- T3 -- Anti TPO -- Anti Thyroglobulin Ab -- TSI --      A1C with Estimated Average Glucose Result: 9.4 % (09-06-22 @ 16:46)  A1C with Estimated Average Glucose Result: 9.2 % (07-11-22 @ 07:36)  A1C with Estimated Average Glucose Result: 8.0 % (05-10-22 @ 12:26)  A1C with Estimated Average Glucose Result: 9.5 % (03-29-22 @ 13:50)  A1C with Estimated Average Glucose Result: 7.8 % (11-30-21 @ 09:05)          Contact number: karoline [phone] or [phone]

## 2022-09-17 NOTE — PROGRESS NOTE ADULT - NUTRITIONAL ASSESSMENT
Diet, NPO with Tube Feed:   Tube Feeding Modality: Nasogastric  Glucerna 1.5 Chago (GLUCERNA1.5RTH)  Total Volume for 24 Hours (mL): 1440  Continuous  Starting Tube Feed Rate {mL per Hour}: 20  Increase Tube Feed Rate by (mL): 20     Every hour  Until Goal Tube Feed Rate (mL per Hour): 60  Tube Feed Duration (in Hours): 24  Tube Feed Start Time: 12:00 (09-16-22 @ 11:36) [Active]

## 2022-09-17 NOTE — PROGRESS NOTE ADULT - SUBJECTIVE AND OBJECTIVE BOX
Patient seen and examined at the bedside.    Remained critically ill on continuous ICU monitoring.    OBJECTIVE:  Vital Signs Last 24 Hrs  T(C): 36.4 (17 Sep 2022 08:00), Max: 37.8 (17 Sep 2022 00:00)  T(F): 97.6 (17 Sep 2022 08:00), Max: 100.1 (17 Sep 2022 00:00)  HR: 92 (17 Sep 2022 07:00) (92 - 107)  BP: 142/73 (17 Sep 2022 07:00) (97/54 - 152/82)  BP(mean): 90 (17 Sep 2022 07:00) (68 - 103)  RR: 32 (17 Sep 2022 07:00) (10 - 37)  SpO2: 95% (17 Sep 2022 07:00) (94% - 100%)    Parameters below as of 17 Sep 2022 08:00  Patient On (Oxygen Delivery Method): room air    O2 Concentration (%): 21      Physical Exam:   General: obese, elderly female, OOBTC, NAD  Neurology: sedated, but opens eyes, not following commands  Eyes: right pupil reactive to light, left surgical pupil  ENT/Neck: Neck supple, trachea midline, No JVD, +ETT midline   Respiratory: Clear bilaterally   CV: S1S2, no murmurs        [x] Sternal dressing        [x] Afib,  [x] Temporary pacing, - VVI 50  Abdominal: Soft, NT, ND, colostomy in place  Extremities: 1+ pedal edema noted, + peripheral pulses   Skin: Dry dressing over right heel, no Rashes, Hematoma, Ecchymosis                              Assessment:  71F PMH DM, COPD, Chronic Adrenal Insufficiency on Chronic prednisone, history of colorectal cancer s/p resection (colostomy bag), Hx of CAD, Chronic A fib on Eliquis, and tracheomalacia and multiple intubations, recent dx of OM presented to ED at [facility] this morning with epigastric pain, belching and central chest pain. Found on CTA to have type A aortic dissection and transferred to [facility] for surgical evaluation by Dr. Cabrera.     Ascending aortic dissection s/p modified bentall and hemiarch on 9/6   Hypovolemic shock   Post op respiratory insufficiency  Acute blood loss anemia  Stress hyperglycemia   RIJ thrombus  Pancreatic cyst    Proteus PNA      Plan:   ***Neuro***  [x] Nonfocal   Post operative neuro assessment   Continue Seroquel, improvement in mental status yesterday  Infectious workup for delirium      ***Cardiovascular***  CT Chest on 9/12:  Status post recent ascending aortic dissection repair. Small amount of fluid surrounding the ascending aorta without evidence of enhancing wall to suggest abscess.  RUE duplex on 9/12: There is a thrombosed and occluded RIJ   Invasive hemodynamic monitoring, assess perfusion indices   Afib /MAP 65/ Hct 34.7/ Lactate 1.7  Blood pressure management with Lopressor, also started home Lisinopril   [x] Temporary pacing - VVI paced at 50 stand by mode  Volume: [x] Albumin x1 this AM  Continuos reassessment of hemodynamics post resuscitation   Mexiletine for hx of afib   [x] AC therapy with Eliquis     Serial EKG and cardiac enzymes   BP slightly lower today, will dc lisinopril and evaluate BB      ***Pulmonary***  CT Chest on 9/12: Bilateral lower lobe atelectasis with bilateral pleural effusions   Reintubated for change in mental status on 9/8, self extubated on 9/9 and reintubated again, extubated to HFO2 on 9/13, now sating on room air in 90s  Encourage incentive spirometry, continue pulse ox monitoring, follow ABGs   Hx of COPD / Continue bronchodilators and Prednisone   Monitor secretions and suction PRN               ***GI***  CT A/P on 9/12: Mild thickening of the cecum and ascending colon possibly representing mild nonspecific proximal colitis. Hepatic cirrhosis. The focal IPMN of the pancreas with large cyst within the tail the pancreas measuring 3.1 cm.  Colostomy bag in place, continue to monitor output   [x]  Diet: Tolerating PO consistent carb diet per swallow, however eating less than 50% of tray due to mood > rec/sp SLP to see today, recc strict NPO > will resume TF  [x] Protonix       ***Renal***  Continue to monitor I/Os, BUN/Creatinine.   Replete lytes PRN  Amezcua present  Continue diuresis with Lasix and Aldactone      ***ID***  SCx on 9/8 +Proteus mirabilis   UA on 9/9+ LE, WBC 60, few yeast  /  UCx on 9/10 NG   Vancomycin for Empiric Dosing  BCx on 9/10 NGTD, BCx on 9/12 NGTD   Nystatin for thrush   Diflucan added OVN for Yeast found in Urine   On CTX  Continue Meropenem     ***Endocrine***  [x]  DM : HbA1c 9.4%                - [x] Insulin gtt                - Need tight glycemic control to prevent wound infection.        Patient requires continuous monitoring with bedside rhythm monitoring, pulse oximetry monitoring, and continuous central venous and arterial pressure monitoring; and intermittent blood gas analysis. Care plan discussed with the ICU care team.   Patient remained critical, at risk for life threatening decompensation.    I have spent 30 minutes providing critical care management to this patient.    By signing my name below, I, Maria D'Amico, attest that this documentation has been prepared under the direction and in the presence of KIANA Briseno   Electronically signed: Maria D'Amico, Scribe, 09-17-22 @ 08:08    I, KIANA Briseno , personally performed the services described in this documentation. all medical record entries made by the scribe were at my direction and in my presence. I have reviewed the chart and agree that the record reflects my personal performance and is accurate and complete  Electronically signed: KIANA Briseno  Patient seen and examined at the bedside.    Remained critically ill on continuous ICU monitoring.    OBJECTIVE:  Vital Signs Last 24 Hrs  T(C): 36.4 (17 Sep 2022 08:00), Max: 37.8 (17 Sep 2022 00:00)  T(F): 97.6 (17 Sep 2022 08:00), Max: 100.1 (17 Sep 2022 00:00)  HR: 92 (17 Sep 2022 07:00) (92 - 107)  BP: 142/73 (17 Sep 2022 07:00) (97/54 - 152/82)  BP(mean): 90 (17 Sep 2022 07:00) (68 - 103)  RR: 32 (17 Sep 2022 07:00) (10 - 37)  SpO2: 95% (17 Sep 2022 07:00) (94% - 100%)    Parameters below as of 17 Sep 2022 08:00  Patient On (Oxygen Delivery Method): room air    O2 Concentration (%): 21      Physical Exam:   General: obese, elderly female, OOBTC, NAD  Neurology: sedated, but opens eyes, not following commands  Eyes: right pupil reactive to light, left surgical pupil  ENT/Neck: Neck supple, trachea midline, No JVD, +ETT midline   Respiratory: Clear bilaterally   CV: S1S2, no murmurs        [x] Sternal dressing        [x] Afib,  [x] Temporary pacing, - VVI 50  Abdominal: Soft, NT, ND, colostomy in place  Extremities: 1+ pedal edema noted, + peripheral pulses   Skin: Dry dressing over right heel, no Rashes, Hematoma, Ecchymosis                              Assessment:  71F PMH DM, COPD, Chronic Adrenal Insufficiency on Chronic prednisone, history of colorectal cancer s/p resection (colostomy bag), Hx of CAD, Chronic A fib on Eliquis, and tracheomalacia and multiple intubations, recent dx of OM presented to ED at [facility] this morning with epigastric pain, belching and central chest pain. Found on CTA to have type A aortic dissection and transferred to [facility] for surgical evaluation by Dr. Cabrera.     Ascending aortic dissection s/p modified bentall and hemiarch on 9/6   Hypovolemic shock   Post op respiratory insufficiency  Acute blood loss anemia  Stress hyperglycemia   RIJ thrombus  Pancreatic cyst    Proteus PNA  Leukocytosis    Today:  -     Plan:   ***Neuro***  [x] Nonfocal   Post operative neuro assessment   Continue Seroquel, improvement in mental status yesterday  Infectious workup for delirium      ***Cardiovascular***  CT Chest on 9/12:  Status post recent ascending aortic dissection repair. Small amount of fluid surrounding the ascending aorta without evidence of enhancing wall to suggest abscess.  RUE duplex on 9/12: There is a thrombosed and occluded RIJ   Invasive hemodynamic monitoring, assess perfusion indices   Afib /MAP 65/ Hct 34.7/ Lactate 1.7  Blood pressure management with Lopressor, also started home Lisinopril   [x] Temporary pacing - VVI paced at 50 stand by mode  Volume: [x] Albumin x1 this AM  Continuos reassessment of hemodynamics post resuscitation   Mexiletine for hx of afib   [x] AC therapy with Eliquis     Serial EKG and cardiac enzymes   BP slightly lower today, will dc lisinopril and evaluate BB      ***Pulmonary***  CT Chest on 9/12: Bilateral lower lobe atelectasis with bilateral pleural effusions   Reintubated for change in mental status on 9/8, self extubated on 9/9 and reintubated again, extubated to HFO2 on 9/13, now sating on room air in 90s  Encourage incentive spirometry, continue pulse ox monitoring, follow ABGs   Hx of COPD / Continue bronchodilators and Prednisone   Monitor secretions and suction PRN               ***GI***  CT A/P on 9/12: Mild thickening of the cecum and ascending colon possibly representing mild nonspecific proximal colitis. Hepatic cirrhosis. The focal IPMN of the pancreas with large cyst within the tail the pancreas measuring 3.1 cm.  Colostomy bag in place, continue to monitor output   [x]  Diet: Tolerating PO consistent carb diet per swallow, however eating less than 50% of tray due to mood > rec/sp SLP to see today, recc strict NPO > will resume TF  [x] Protonix       ***Renal***  Continue to monitor I/Os, BUN/Creatinine.   Replete lytes PRN  Amezcua present  Continue diuresis with Lasix and Aldactone      ***ID***  SCx on 9/8 +Proteus mirabilis   UA on 9/9+ LE, WBC 60, few yeast  /  UCx on 9/10 NG   Vancomycin for Empiric Dosing  BCx on 9/10 NGTD, BCx on 9/12 NGTD   Nystatin for thrush   Diflucan added OVN for Yeast found in Urine   On CTX  Continue Meropenem     ***Endocrine***  [x]  DM : HbA1c 9.4%                - [x] Insulin gtt              - Need tight glycemic control to prevent wound infection.        Patient requires continuous monitoring with bedside rhythm monitoring, pulse oximetry monitoring, and continuous central venous and arterial pressure monitoring; and intermittent blood gas analysis. Care plan discussed with the ICU care team.   Patient remained critical, at risk for life threatening decompensation.    I have spent 30 minutes providing critical care management to this patient.    By signing my name below, I, Maria D'Amico, attest that this documentation has been prepared under the direction and in the presence of KIANA Briseno   Electronically signed: Maria D'Amico, Scribe, 09-17-22 @ 08:08    IHumza PA , personally performed the services described in this documentation. all medical record entries made by the scribe were at my direction and in my presence. I have reviewed the chart and agree that the record reflects my personal performance and is accurate and complete  Electronically signed: KIANA Briseno  Patient seen and examined at the bedside.    Remained critically ill on continuous ICU monitoring.    OBJECTIVE:  Vital Signs Last 24 Hrs  T(C): 36.4 (17 Sep 2022 08:00), Max: 37.8 (17 Sep 2022 00:00)  T(F): 97.6 (17 Sep 2022 08:00), Max: 100.1 (17 Sep 2022 00:00)  HR: 92 (17 Sep 2022 07:00) (92 - 107)  BP: 142/73 (17 Sep 2022 07:00) (97/54 - 152/82)  BP(mean): 90 (17 Sep 2022 07:00) (68 - 103)  RR: 32 (17 Sep 2022 07:00) (10 - 37)  SpO2: 95% (17 Sep 2022 07:00) (94% - 100%)    Parameters below as of 17 Sep 2022 08:00  Patient On (Oxygen Delivery Method): room air    O2 Concentration (%): 21      Physical Exam:   General: obese, elderly female, OOBTC, NAD  Neurology: sedated, but opens eyes, not following commands  Eyes: right pupil reactive to light, left surgical pupil  ENT/Neck: Neck supple, trachea midline, No JVD, +ETT midline   Respiratory: Clear bilaterally   CV: S1S2, no murmurs        [x] Sternal dressing        [x] Afib,  [x] Temporary pacing, - VVI 50  Abdominal: Soft, NT, ND, colostomy in place  Extremities: 1+ pedal edema noted, + peripheral pulses   Skin: Dry dressing over right heel, no Rashes, Hematoma, Ecchymosis                              Assessment:  71F PMH DM, COPD, Chronic Adrenal Insufficiency on Chronic prednisone, history of colorectal cancer s/p resection (colostomy bag), Hx of CAD, Chronic A fib on Eliquis, and tracheomalacia and multiple intubations, recent dx of OM presented to ED at [facility] this morning with epigastric pain, belching and central chest pain. Found on CTA to have type A aortic dissection and transferred to [facility] for surgical evaluation by Dr. Cabrera.     Ascending aortic dissection s/p modified bentall and hemiarch on 9/6   Hypovolemic shock   Post op respiratory insufficiency  Acute blood loss anemia  Stress hyperglycemia   RIJ thrombus  Pancreatic cyst    Proteus PNA  Leukocytosis    Today:  - Received 500 cc of Albumin   - Restarted TFs Glucerna at goal 60 cc/hr    Plan:   ***Neuro***  [x] Nonfocal   Post operative neuro assessment   Continue Seroquel, improvement in mental status  Infectious workup for delirium      ***Cardiovascular***  CT Chest on 9/12:  Status post recent ascending aortic dissection repair. Small amount of fluid surrounding the ascending aorta without evidence of enhancing wall to suggest abscess.  RUE duplex on 9/12: There is a thrombosed and occluded RIJ   Invasive hemodynamic monitoring, assess perfusion indices   Afib /MAP 65/ Hct 34.7/ Lactate 1.7  Blood pressure management with Lopressor, also started home Lisinopril   [x] Temporary pacing - VVI paced at 50 stand by mode  Volume: [x] Albumin 500 cc  Continuos reassessment of hemodynamics post resuscitation   Mexiletine for hx of afib   [x] AC therapy with Eliquis     Serial EKG and cardiac enzymes   Lisinopril dc'ed due to lower BP, evaluate BB      ***Pulmonary***  CT Chest on 9/12: Bilateral lower lobe atelectasis with bilateral pleural effusions   Reintubated for change in mental status on 9/8, self extubated on 9/9 and reintubated again, extubated to HFO2 on 9/13, now sating on room air in 90s  Encourage incentive spirometry, continue pulse ox monitoring, follow ABGs   Hx of COPD / Continue bronchodilators and Prednisone   Monitor secretions and suction PRN               ***GI***  CT A/P on 9/12: Mild thickening of the cecum and ascending colon possibly representing mild nonspecific proximal colitis. Hepatic cirrhosis. The focal IPMN of the pancreas with large cyst within the tail the pancreas measuring 3.1 cm.  Colostomy bag in place, continue to monitor output   [x] Diet: Restarted TFs Glucerna at goal 60 cc/hr  [x] Protonix       ***Renal***  Continue to monitor I/Os, BUN/Creatinine.   Replete lytes PRN  Amezcua present  Continue diuresis with Lasix and Aldactone      ***ID***  SCx on 9/8 +Proteus mirabilis   UA on 9/9+ LE, WBC 60, few yeast  /  UCx on 9/10 NG   Vancomycin for Empiric Dosing  BCx on 9/10 NGTD, BCx on 9/12 NGTD   Nystatin for thrush   Diflucan added OVN for Yeast found in Urine   On CTX  Continue Meropenem     ***Endocrine***  [x]  DM : HbA1c 9.4%                - [x] Insulin gtt              - Need tight glycemic control to prevent wound infection.        Patient requires continuous monitoring with bedside rhythm monitoring, pulse oximetry monitoring, and continuous central venous and arterial pressure monitoring; and intermittent blood gas analysis. Care plan discussed with the ICU care team.   Patient remained critical, at risk for life threatening decompensation.    I have spent 30 minutes providing critical care management to this patient.    By signing my name below, I, Maria D'Amico, attest that this documentation has been prepared under the direction and in the presence of KIANA Briseno   Electronically signed: Maria D'Amico, Scribe, 09-17-22 @ 08:08    I, KIANA Briseno , personally performed the services described in this documentation. all medical record entries made by the marcyibe were at my direction and in my presence. I have reviewed the chart and agree that the record reflects my personal performance and is accurate and complete  Electronically signed: KIANA Briseno  Patient seen and examined at the bedside.    Remained critically ill on continuous ICU monitoring.    OBJECTIVE:  Vital Signs Last 24 Hrs  T(C): 36.4 (17 Sep 2022 08:00), Max: 37.8 (17 Sep 2022 00:00)  T(F): 97.6 (17 Sep 2022 08:00), Max: 100.1 (17 Sep 2022 00:00)  HR: 92 (17 Sep 2022 07:00) (92 - 107)  BP: 142/73 (17 Sep 2022 07:00) (97/54 - 152/82)  BP(mean): 90 (17 Sep 2022 07:00) (68 - 103)  RR: 32 (17 Sep 2022 07:00) (10 - 37)  SpO2: 95% (17 Sep 2022 07:00) (94% - 100%)    Parameters below as of 17 Sep 2022 08:00  Patient On (Oxygen Delivery Method): room air    O2 Concentration (%): 21      Physical Exam:   General: obese, elderly female, OOBTC, NAD  Neurology: arousable, following commands   Eyes: right pupil reactive to light, left surgical pupil  ENT/Neck: Neck supple, trachea midline, No JVD  Respiratory: Clear bilaterally   CV: S1S2, no murmurs        [x] Sternal dressing        [x] NSR  Abdominal: Soft, NT, ND, colostomy in place  Extremities: trace pedal edema noted, + peripheral pulses   Skin: Dry dressing over right heel, no Rashes, Hematoma, Ecchymosis                              Assessment:  71F PMH DM, COPD, Chronic Adrenal Insufficiency on Chronic prednisone, history of colorectal cancer s/p resection (colostomy bag), Hx of CAD, Chronic A fib on Eliquis, and tracheomalacia and multiple intubations, recent dx of OM presented to ED at [facility] this morning with epigastric pain, belching and central chest pain. Found on CTA to have type A aortic dissection and transferred to [facility] for surgical evaluation by Dr. Cabrera.     Ascending aortic dissection s/p modified bentall and hemiarch on 9/6   Hypovolemic shock   Post op respiratory insufficiency  Acute blood loss anemia  Stress hyperglycemia   RIJ thrombus  Pancreatic cyst    Proteus PNA  Leukocytosis    Today:  - Non-septic appearing, bcx 9/16 NGTD   - Restarted TFs Glucerna at goal 60 cc/hr--plan for FEES later this week     Plan:   ***Neuro***  [x] Nonfocal   Post operative neuro assessment   Continue Seroquel, improvement in mental status  Infectious workup for delirium      ***Cardiovascular***  CT Chest on 9/12:  Status post recent ascending aortic dissection repair. Small amount of fluid surrounding the ascending aorta without evidence of enhancing wall to suggest abscess.  RUE duplex on 9/12: There is a thrombosed and occluded RIJ   Invasive hemodynamic monitoring, assess perfusion indices   NSR /MAP 65/ Hct 34.7/ Lactate 1.7  Blood pressure management with Lopressor and Lisinopril   Mexiletine for hx of afib   [x] AC therapy with Eliquis     Serial EKG and cardiac enzymes   Lisinopril dc'ed due to lower BP, evaluate BB      ***Pulmonary***  CT Chest on 9/12: Bilateral lower lobe atelectasis with bilateral pleural effusions   Reintubated for change in mental status on 9/8, self extubated on 9/9 and reintubated again, extubated to HFO2 on 9/13, now sating on room air in 90s  Encourage incentive spirometry, continue pulse ox monitoring, follow ABGs   Hx of COPD / Continue bronchodilators and Prednisone   Monitor secretions and suction PRN               ***GI***  CT A/P on 9/12: Mild thickening of the cecum and ascending colon possibly representing mild nonspecific proximal colitis. Hepatic cirrhosis. The focal IPMN of the pancreas with large cyst within the tail the pancreas measuring 3.1 cm.  Colostomy bag in place, continue to monitor output   [x] Diet: Restarted TFs Glucerna at goal 60 cc/hr  [x] Protonix       ***Renal***  Continue to monitor I/Os, BUN/Creatinine.   Replete lytes PRN  Amezcua present  Continue diuresis with Lasix and Aldactone      ***ID***  SCx on 9/8 +Proteus mirabilis   UA on 9/9+ LE, WBC 60, few yeast  /  UCx on 9/10 NG   Vancomycin for Empiric Dosing  BCx on 9/10 NGTD, BCx on 9/12 NGTD 9/16 bcx NGTD  Nystatin for thrush   Diflucan added for Yeast found in Urine on 9/16  Continue Meropenem and IV Vanco     ***Endocrine***  [x]  DM : HbA1c 9.4%                - [x] Insulin gtt              - Need tight glycemic control to prevent wound infection.        Patient requires continuous monitoring with bedside rhythm monitoring, pulse oximetry monitoring, and continuous central venous and arterial pressure monitoring; and intermittent blood gas analysis. Care plan discussed with the ICU care team.   Patient remained critical, at risk for life threatening decompensation.    I have spent 35 minutes providing critical care management to this patient.    By signing my name below, I, Maria D'Amico, attest that this documentation has been prepared under the direction and in the presence of KIANA Briseno   Electronically signed: Maria D'Amico, Scribe, 09-17-22 @ 08:08    I, KIANA Briseno , personally performed the services described in this documentation. all medical record entries made by the scribe were at my direction and in my presence. I have reviewed the chart and agree that the record reflects my personal performance and is accurate and complete  Electronically signed: KIANA Briseno  Patient seen and examined at the bedside.    Remained critically ill on continuous ICU monitoring.    OBJECTIVE:  Vital Signs Last 24 Hrs  T(C): 36.4 (17 Sep 2022 08:00), Max: 37.8 (17 Sep 2022 00:00)  T(F): 97.6 (17 Sep 2022 08:00), Max: 100.1 (17 Sep 2022 00:00)  HR: 92 (17 Sep 2022 07:00) (92 - 107)  BP: 142/73 (17 Sep 2022 07:00) (97/54 - 152/82)  BP(mean): 90 (17 Sep 2022 07:00) (68 - 103)  RR: 32 (17 Sep 2022 07:00) (10 - 37)  SpO2: 95% (17 Sep 2022 07:00) (94% - 100%)    Parameters below as of 17 Sep 2022 08:00  Patient On (Oxygen Delivery Method): room air    O2 Concentration (%): 21      Physical Exam:   General: obese, elderly female, OOBTC, NAD  Neurology: arousable, following commands   Eyes: right pupil reactive to light, left surgical pupil  ENT/Neck: Neck supple, trachea midline, No JVD  Respiratory: Clear bilaterally   CV: S1S2, no murmurs        [x] Sternal dressing        [x] NSR  Abdominal: Soft, NT, ND, colostomy in place  Extremities: trace pedal edema noted, + peripheral pulses   Skin: Dry dressing over right heel, no Rashes, Hematoma, Ecchymosis                              Assessment:  71F PMH DM, COPD, Chronic Adrenal Insufficiency on Chronic prednisone, history of colorectal cancer s/p resection (colostomy bag), Hx of CAD, Chronic A fib on Eliquis, and tracheomalacia and multiple intubations, recent dx of OM presented to ED at [facility] this morning with epigastric pain, belching and central chest pain. Found on CTA to have type A aortic dissection and transferred to [facility] for surgical evaluation by Dr. Cabrera.     Ascending aortic dissection s/p modified bentall and hemiarch on 9/6   Hypovolemic shock   Post op respiratory insufficiency  Acute blood loss anemia  Stress hyperglycemia   RIJ thrombus  Pancreatic cyst    Proteus PNA  Leukocytosis    Today:  - Non-septic appearing, bcx 9/16 NGTD   - Restarted TFs Glucerna at goal 60 cc/hr--plan for FEES later this week     Plan:   ***Neuro***  [x] Nonfocal   Post operative neuro assessment   Continue Seroquel, improvement in mental status  Infectious workup for delirium      ***Cardiovascular***  CT Chest on 9/12:  Status post recent ascending aortic dissection repair. Small amount of fluid surrounding the ascending aorta without evidence of enhancing wall to suggest abscess.  RUE duplex on 9/12: There is a thrombosed and occluded RIJ   Invasive hemodynamic monitoring, assess perfusion indices   NSR /MAP 65/ Hct 34.7/ Lactate 1.7  Blood pressure management with Lopressor   Mexiletine for hx of afib   [x] AC therapy with Eliquis     Serial EKG and cardiac enzymes   Lisinopril dc'ed due to lower BP, evaluate BB      ***Pulmonary***  CT Chest on 9/12: Bilateral lower lobe atelectasis with bilateral pleural effusions   Reintubated for change in mental status on 9/8, self extubated on 9/9 and reintubated again, extubated to HFO2 on 9/13, now sating on room air in 90s  Encourage incentive spirometry, continue pulse ox monitoring, follow ABGs   Hx of COPD / Continue bronchodilators and Prednisone   Monitor secretions and suction PRN               ***GI***  CT A/P on 9/12: Mild thickening of the cecum and ascending colon possibly representing mild nonspecific proximal colitis. Hepatic cirrhosis. The focal IPMN of the pancreas with large cyst within the tail the pancreas measuring 3.1 cm.  Colostomy bag in place, continue to monitor output   [x] Diet: Restarted TFs Glucerna at goal 60 cc/hr  [x] Protonix       ***Renal***  Continue to monitor I/Os, BUN/Creatinine.   Replete lytes PRN  Amezcua present  Continue diuresis with Lasix and Aldactone      ***ID***  SCx on 9/8 +Proteus mirabilis   UA on 9/9+ LE, WBC 60, few yeast  /  UCx on 9/10 NG   Vancomycin for Empiric Dosing  BCx on 9/10 NGTD, BCx on 9/12 NGTD 9/16 bcx NGTD  Nystatin for thrush   Diflucan added for Yeast found in Urine on 9/16  Continue Meropenem and IV Vanco     ***Endocrine***  [x]  DM : HbA1c 9.4%                - [x] Insulin gtt              - Need tight glycemic control to prevent wound infection.        Patient requires continuous monitoring with bedside rhythm monitoring, pulse oximetry monitoring, and continuous central venous and arterial pressure monitoring; and intermittent blood gas analysis. Care plan discussed with the ICU care team.   Patient remained critical, at risk for life threatening decompensation.    I have spent 35 minutes providing critical care management to this patient.    By signing my name below, I, Maria D'Amico, attest that this documentation has been prepared under the direction and in the presence of KIANA Briseno   Electronically signed: Maria D'Amico, Scribe, 09-17-22 @ 08:08    I, KIANA Briseno , personally performed the services described in this documentation. all medical record entries made by the scribe were at my direction and in my presence. I have reviewed the chart and agree that the record reflects my personal performance and is accurate and complete  Electronically signed: KIANA Briseno

## 2022-09-18 LAB
ALBUMIN SERPL ELPH-MCNC: 2.9 G/DL — LOW (ref 3.3–5)
ALP SERPL-CCNC: 95 U/L — SIGNIFICANT CHANGE UP (ref 40–120)
ALT FLD-CCNC: 94 U/L — HIGH (ref 10–45)
ANION GAP SERPL CALC-SCNC: 10 MMOL/L — SIGNIFICANT CHANGE UP (ref 5–17)
AST SERPL-CCNC: 36 U/L — SIGNIFICANT CHANGE UP (ref 10–40)
BILIRUB SERPL-MCNC: 0.3 MG/DL — SIGNIFICANT CHANGE UP (ref 0.2–1.2)
BUN SERPL-MCNC: 18 MG/DL — SIGNIFICANT CHANGE UP (ref 7–23)
CALCIUM SERPL-MCNC: 8.8 MG/DL — SIGNIFICANT CHANGE UP (ref 8.4–10.5)
CHLORIDE SERPL-SCNC: 101 MMOL/L — SIGNIFICANT CHANGE UP (ref 96–108)
CO2 SERPL-SCNC: 24 MMOL/L — SIGNIFICANT CHANGE UP (ref 22–31)
CREAT SERPL-MCNC: 0.64 MG/DL — SIGNIFICANT CHANGE UP (ref 0.5–1.3)
CULTURE RESULTS: SIGNIFICANT CHANGE UP
EGFR: 93 ML/MIN/1.73M2 — SIGNIFICANT CHANGE UP
GAS PNL BLDA: SIGNIFICANT CHANGE UP
GLUCOSE BLDC GLUCOMTR-MCNC: 115 MG/DL — HIGH (ref 70–99)
GLUCOSE BLDC GLUCOMTR-MCNC: 127 MG/DL — HIGH (ref 70–99)
GLUCOSE BLDC GLUCOMTR-MCNC: 132 MG/DL — HIGH (ref 70–99)
GLUCOSE BLDC GLUCOMTR-MCNC: 133 MG/DL — HIGH (ref 70–99)
GLUCOSE BLDC GLUCOMTR-MCNC: 138 MG/DL — HIGH (ref 70–99)
GLUCOSE BLDC GLUCOMTR-MCNC: 141 MG/DL — HIGH (ref 70–99)
GLUCOSE BLDC GLUCOMTR-MCNC: 143 MG/DL — HIGH (ref 70–99)
GLUCOSE BLDC GLUCOMTR-MCNC: 146 MG/DL — HIGH (ref 70–99)
GLUCOSE BLDC GLUCOMTR-MCNC: 148 MG/DL — HIGH (ref 70–99)
GLUCOSE BLDC GLUCOMTR-MCNC: 152 MG/DL — HIGH (ref 70–99)
GLUCOSE BLDC GLUCOMTR-MCNC: 154 MG/DL — HIGH (ref 70–99)
GLUCOSE BLDC GLUCOMTR-MCNC: 154 MG/DL — HIGH (ref 70–99)
GLUCOSE BLDC GLUCOMTR-MCNC: 157 MG/DL — HIGH (ref 70–99)
GLUCOSE BLDC GLUCOMTR-MCNC: 164 MG/DL — HIGH (ref 70–99)
GLUCOSE BLDC GLUCOMTR-MCNC: 171 MG/DL — HIGH (ref 70–99)
GLUCOSE BLDC GLUCOMTR-MCNC: 176 MG/DL — HIGH (ref 70–99)
GLUCOSE BLDC GLUCOMTR-MCNC: 178 MG/DL — HIGH (ref 70–99)
GLUCOSE BLDC GLUCOMTR-MCNC: 180 MG/DL — HIGH (ref 70–99)
GLUCOSE BLDC GLUCOMTR-MCNC: 197 MG/DL — HIGH (ref 70–99)
GLUCOSE BLDC GLUCOMTR-MCNC: 202 MG/DL — HIGH (ref 70–99)
GLUCOSE BLDC GLUCOMTR-MCNC: 205 MG/DL — HIGH (ref 70–99)
GLUCOSE BLDC GLUCOMTR-MCNC: 208 MG/DL — HIGH (ref 70–99)
GLUCOSE BLDC GLUCOMTR-MCNC: 81 MG/DL — SIGNIFICANT CHANGE UP (ref 70–99)
GLUCOSE SERPL-MCNC: 137 MG/DL — HIGH (ref 70–99)
HCT VFR BLD CALC: 33.3 % — LOW (ref 34.5–45)
HGB BLD-MCNC: 9.9 G/DL — LOW (ref 11.5–15.5)
MAGNESIUM SERPL-MCNC: 1.9 MG/DL — SIGNIFICANT CHANGE UP (ref 1.6–2.6)
MCHC RBC-ENTMCNC: 23.2 PG — LOW (ref 27–34)
MCHC RBC-ENTMCNC: 29.7 GM/DL — LOW (ref 32–36)
MCV RBC AUTO: 78.2 FL — LOW (ref 80–100)
NRBC # BLD: 1 /100 WBCS — HIGH (ref 0–0)
PHOSPHATE SERPL-MCNC: 2.5 MG/DL — SIGNIFICANT CHANGE UP (ref 2.5–4.5)
PLATELET # BLD AUTO: 344 K/UL — SIGNIFICANT CHANGE UP (ref 150–400)
POTASSIUM SERPL-MCNC: 4.5 MMOL/L — SIGNIFICANT CHANGE UP (ref 3.5–5.3)
POTASSIUM SERPL-SCNC: 4.5 MMOL/L — SIGNIFICANT CHANGE UP (ref 3.5–5.3)
PROT SERPL-MCNC: 6.1 G/DL — SIGNIFICANT CHANGE UP (ref 6–8.3)
RBC # BLD: 4.26 M/UL — SIGNIFICANT CHANGE UP (ref 3.8–5.2)
RBC # FLD: SIGNIFICANT CHANGE UP (ref 10.3–14.5)
SODIUM SERPL-SCNC: 135 MMOL/L — SIGNIFICANT CHANGE UP (ref 135–145)
SPECIMEN SOURCE: SIGNIFICANT CHANGE UP
VANCOMYCIN TROUGH SERPL-MCNC: 11.5 UG/ML — SIGNIFICANT CHANGE UP (ref 10–20)
VANCOMYCIN TROUGH SERPL-MCNC: 21 UG/ML — HIGH (ref 10–20)
WBC # BLD: 24.17 K/UL — HIGH (ref 3.8–10.5)
WBC # FLD AUTO: 24.17 K/UL — HIGH (ref 3.8–10.5)

## 2022-09-18 PROCEDURE — 99232 SBSQ HOSP IP/OBS MODERATE 35: CPT

## 2022-09-18 PROCEDURE — 99233 SBSQ HOSP IP/OBS HIGH 50: CPT | Mod: GC

## 2022-09-18 PROCEDURE — 99291 CRITICAL CARE FIRST HOUR: CPT | Mod: 24

## 2022-09-18 PROCEDURE — 71045 X-RAY EXAM CHEST 1 VIEW: CPT | Mod: 26

## 2022-09-18 RX ORDER — ACETAMINOPHEN 500 MG
675 TABLET ORAL EVERY 6 HOURS
Refills: 0 | Status: DISCONTINUED | OUTPATIENT
Start: 2022-09-18 | End: 2022-09-28

## 2022-09-18 RX ORDER — MAGNESIUM SULFATE 500 MG/ML
2 VIAL (ML) INJECTION ONCE
Refills: 0 | Status: COMPLETED | OUTPATIENT
Start: 2022-09-18 | End: 2022-09-18

## 2022-09-18 RX ADMIN — Medication 3 MILLILITER(S): at 05:46

## 2022-09-18 RX ADMIN — Medication 0.5 MILLIGRAM(S): at 17:49

## 2022-09-18 RX ADMIN — MEROPENEM 100 MILLIGRAM(S): 1 INJECTION INTRAVENOUS at 06:15

## 2022-09-18 RX ADMIN — Medication 25 MILLIGRAM(S): at 06:25

## 2022-09-18 RX ADMIN — MEXILETINE HYDROCHLORIDE 200 MILLIGRAM(S): 150 CAPSULE ORAL at 21:12

## 2022-09-18 RX ADMIN — Medication 25 GRAM(S): at 07:41

## 2022-09-18 RX ADMIN — FLUCONAZOLE 100 MILLIGRAM(S): 150 TABLET ORAL at 04:15

## 2022-09-18 RX ADMIN — Medication 0.5 MILLIGRAM(S): at 05:45

## 2022-09-18 RX ADMIN — APIXABAN 5 MILLIGRAM(S): 2.5 TABLET, FILM COATED ORAL at 11:28

## 2022-09-18 RX ADMIN — CHLORHEXIDINE GLUCONATE 1 APPLICATION(S): 213 SOLUTION TOPICAL at 06:29

## 2022-09-18 RX ADMIN — Medication 3 MILLILITER(S): at 09:17

## 2022-09-18 RX ADMIN — INSULIN HUMAN 3 UNIT(S)/HR: 100 INJECTION, SOLUTION SUBCUTANEOUS at 13:35

## 2022-09-18 RX ADMIN — Medication 675 MILLIGRAM(S): at 21:00

## 2022-09-18 RX ADMIN — Medication 1 TABLET(S): at 11:28

## 2022-09-18 RX ADMIN — SPIRONOLACTONE 25 MILLIGRAM(S): 25 TABLET, FILM COATED ORAL at 06:26

## 2022-09-18 RX ADMIN — Medication 3 MILLILITER(S): at 17:49

## 2022-09-18 RX ADMIN — Medication 675 MILLIGRAM(S): at 20:30

## 2022-09-18 RX ADMIN — Medication 8 MILLIGRAM(S): at 06:35

## 2022-09-18 RX ADMIN — Medication 500000 UNIT(S): at 17:01

## 2022-09-18 RX ADMIN — Medication 500000 UNIT(S): at 11:28

## 2022-09-18 RX ADMIN — APIXABAN 5 MILLIGRAM(S): 2.5 TABLET, FILM COATED ORAL at 23:52

## 2022-09-18 RX ADMIN — SPIRONOLACTONE 25 MILLIGRAM(S): 25 TABLET, FILM COATED ORAL at 17:50

## 2022-09-18 RX ADMIN — Medication 3 MILLILITER(S): at 23:21

## 2022-09-18 RX ADMIN — MONTELUKAST 10 MILLIGRAM(S): 4 TABLET, CHEWABLE ORAL at 21:12

## 2022-09-18 RX ADMIN — HUMAN INSULIN 15 UNIT(S): 100 INJECTION, SUSPENSION SUBCUTANEOUS at 06:00

## 2022-09-18 RX ADMIN — PANTOPRAZOLE SODIUM 40 MILLIGRAM(S): 20 TABLET, DELAYED RELEASE ORAL at 11:28

## 2022-09-18 RX ADMIN — QUETIAPINE FUMARATE 25 MILLIGRAM(S): 200 TABLET, FILM COATED ORAL at 21:12

## 2022-09-18 RX ADMIN — MEXILETINE HYDROCHLORIDE 200 MILLIGRAM(S): 150 CAPSULE ORAL at 13:36

## 2022-09-18 RX ADMIN — MEXILETINE HYDROCHLORIDE 200 MILLIGRAM(S): 150 CAPSULE ORAL at 06:28

## 2022-09-18 RX ADMIN — Medication 500000 UNIT(S): at 00:40

## 2022-09-18 RX ADMIN — Medication 25 MILLIGRAM(S): at 13:36

## 2022-09-18 RX ADMIN — Medication 25 MILLIGRAM(S): at 21:12

## 2022-09-18 RX ADMIN — Medication 500000 UNIT(S): at 23:52

## 2022-09-18 RX ADMIN — Medication 250 MILLIGRAM(S): at 17:50

## 2022-09-18 RX ADMIN — MEROPENEM 100 MILLIGRAM(S): 1 INJECTION INTRAVENOUS at 13:35

## 2022-09-18 RX ADMIN — Medication 20 MILLIGRAM(S): at 06:25

## 2022-09-18 RX ADMIN — INSULIN HUMAN 3 UNIT(S)/HR: 100 INJECTION, SOLUTION SUBCUTANEOUS at 20:31

## 2022-09-18 RX ADMIN — Medication 500000 UNIT(S): at 06:25

## 2022-09-18 RX ADMIN — MEROPENEM 100 MILLIGRAM(S): 1 INJECTION INTRAVENOUS at 21:12

## 2022-09-18 RX ADMIN — Medication 1 APPLICATION(S): at 11:28

## 2022-09-18 NOTE — CHART NOTE - NSCHARTNOTEFT_GEN_A_CORE
Nutrition Follow Up Note  Patient seen for: Follow up - Results of 3-day Calorie Count    Chart reviewed, events noted. Pt is a 71F PMH DM, COPD, Chronic Adrenal Insufficiency on Chronic prednisone, history of colorectal cancer s/p resection (colostomy bag), Hx of CAD, Chronic A fib on Eliquis, and tracheomalacia and multiple intubations, recent dx of OM presented to ED at [facility] this morning with epigastric pain, belching and central chest pain. Found on CTA to have type A aortic dissection s/p modified bentall and hemiarch on .    Source: [x] Patient       [x] EMR        [x] RN        [] Family at bedside       [x] Other: team    -If unable to interview patient: [] Trach/Vent/BiPAP  [] Disoriented/confused/inappropriate to interview    Diet, NPO with Tube Feed:   Tube Feeding Modality: Nasogastric  Glucerna 1.5 Chago (GLUCERNA1.5RTH)  Total Volume for 24 Hours (mL): 1440  Continuous  Starting Tube Feed Rate {mL per Hour}: 20  Increase Tube Feed Rate by (mL): 20     Every hour  Until Goal Tube Feed Rate (mL per Hour): 60  Tube Feed Duration (in Hours): 24  Tube Feed Start Time: 12:00 (22 @ 11:36) [Active]    EN Order Provides: 1440 ml total volume, 2160 kcal, 119 gm protein and 1093 ml free water. Feeds at goal would meet 32 kcal/kg and 1.75 g/kg protein based on dosing wt of 67.7 kg.    EN provision per RN flow sheets:  (9/15): 0 ml - chago count had been initiated  (): 1080 ml - (75% EN volume ordered)  (): 1260 ml (88% EN volume ordered)  (): 480 ml so far today  --> Pt has received 82% EN regimen goal x past 2 days meeting ~93% lower end energy needs and within 100% lower end protein needs.  --> Upon RD visit, EN feeds of Glucerna 1.5 infusing via NGT    Nutrition-related concerns:  - Noted Calorie Count Sheet filled out for 9/15: pt consumed ~700 kcal and ~15 gm protein; pt then on EN feeds only  and    - Pt extubated , passed SLP evaluation on  recommended for soft and bite sized and mildly thickened liquids. Remains with NGT.   --> Noted speech therapist re-evaluated pt  as pt started having intermittent delirium and agitation at night as well as new baseline wet cough per chart; speech therapist then recommendation "NPO with non-oral nutrition/hydration/medications"  - Noted tentative plans for FEES   - HbA1c 9.4%. Insulin gtt for BG management. Pt also receiving Prednisone.  - Multivitamin ordered    GI: +colostomy, 675mL output noted .  Bowel Regimen? [] Yes   [x] No   - Antibiotic regimen noted.    Weights:   Daily Weight in k.3 (), Weight in k.1 (), Weight in k.3 (), Weight in k.3 (09-15), Weight in k.4 (), Weight in k.7 ()   - Weight fluctuations noted, likely 2/2 fluid shifts - pt on Lasix + Spironolactone, will continue to monitor    - Pt reports UBW 137lbs or 62.3kg. Denies any recent wt changes PTA, endorses weight gain since admission 2/2 fluid.    MEDICATIONS  (STANDING):  fluconAZOLE IVPB  furosemide    Tablet  insulin regular Infusion  meropenem  IVPB  metoprolol tartrate  mexiletine  multivitamin  nystatin    Suspension  pantoprazole  Injectable  predniSONE   Tablet  spironolactone  vancomycin  IVPB    Pertinent Labs:  @ 00:42: Na 135, BUN 18, Cr 0.64, <H>, K+ 4.5, Phos 2.5, Mg 1.9, Alk Phos 95, ALT/SGPT 94<H>, AST/SGOT 36, HbA1c --    A1C with Estimated Average Glucose Result: 9.4 % (22 @ 16:46)  A1C with Estimated Average Glucose Result: 9.2 % (22 @ 07:36)  A1C with Estimated Average Glucose Result: 8.0 % (05-10-22 @ 12:26)  A1C with Estimated Average Glucose Result: 9.5 % (22 @ 13:50)    Finger Sticks:  POCT Blood Glucose.: 148 mg/dL ( @ 13:49)  POCT Blood Glucose.: 141 mg/dL ( @ 13:06)  POCT Blood Glucose.: 208 mg/dL ( @ 12:05)  POCT Blood Glucose.: 176 mg/dL ( @ 11:12)  POCT Blood Glucose.: 154 mg/dL ( @ 10:12)  POCT Blood Glucose.: 146 mg/dL ( @ 09:18)  POCT Blood Glucose.: 157 mg/dL ( @ 07:59)  POCT Blood Glucose.: 180 mg/dL ( @ 06:49)  POCT Blood Glucose.: 205 mg/dL ( @ 06:06)  POCT Blood Glucose.: 197 mg/dL ( @ 05:26)  POCT Blood Glucose.: 171 mg/dL ( @ 04:32)  POCT Blood Glucose.: 202 mg/dL ( @ 03:47)  POCT Blood Glucose.: 143 mg/dL ( @ 02:11)  POCT Blood Glucose.: 133 mg/dL ( @ 01:25)  POCT Blood Glucose.: 132 mg/dL ( @ 00:34)  POCT Blood Glucose.: 121 mg/dL ( @ 23:31)  POCT Blood Glucose.: 106 mg/dL ( @ 22:40)  POCT Blood Glucose.: 164 mg/dL ( @ 20:51)  POCT Blood Glucose.: 182 mg/dL ( @ 20:04)  POCT Blood Glucose.: 209 mg/dL ( @ 18:49)  POCT Blood Glucose.: 199 mg/dL ( @ 18:00)  POCT Blood Glucose.: 166 mg/dL ( @ 17:03)  POCT Blood Glucose.: 112 mg/dL ( @ 15:58)  POCT Blood Glucose.: 122 mg/dL ( @ 14:51)    Skin per nursing documentation: No noted pressure injuries as per documentation; pt with midline sternal incision  Edema: 1+ generalized, 2+ bilateral arm    Nutritional needs: based on dosing wt 67.7kg with consideration for extubation  Estimated Energy Needs: 1897 - 2234kcal (28 - 33kcal/kg)  Estimated Protein Needs: 95 - 122g (1.4 - 1.8g/kg)  Estimated Fluid Needs: per team.    Previous Nutrition Diagnosis: Inadequate Protein Energy Intake  Nutrition Diagnosis is: [x] ongoing  [] resolved [] not applicable     Previous Nutrition Diagnosis: Increased Nutrient Needs  Nutrition Diagnosis is: [x] ongoing  [] resolved [] not applicable     New Nutrition Diagnosis: [x] Not applicable    Nutrition Care Plan:  [x] In Progress - being addressed with EN at goal, micronutrient supplementation  [] Achieved  [] Not applicable    Nutrition Interventions:     Education Provided:       [x] No: not appropriate at this time    Recommendations:  1. Calorie count had been discontinued as pt now NPO with EN only; consider re-initiation of calorie count if pt passes FEES.  2. Recommend continue current EN regimen: Glucerna 1.5 at 60 ml/hr x 24 hours to provide 1440 ml total volume, 2160 kcal, 119 gm protein and 1093 ml free water. Feeds at goal would meet 32 kcal/kg and 1.75 g/kg protein based on dosing wt of 67.7 kg.  3. Continue with multivitamin supplementation unless otherwise contraindicated.  .   4. Monitor colostomy output, consider bowel regimen PRN.     Monitoring and Evaluation:   Continue to monitor nutritional intake, tolerance to diet prescription, weights, labs, skin integrity    RD remains available upon request and will follow up per protocol  Emi Palomo, MS, RD, CDN, [facility] pgr #[phone]

## 2022-09-18 NOTE — PROGRESS NOTE ADULT - SUBJECTIVE AND OBJECTIVE BOX
Patient is a 71y old  Female who presents with a chief complaint of Type A aortic dissection (17 Sep 2022 11:57)    Being followed by ID for Pneumonia, Fever    Interval history:  Afebrile after 9/16  No acute events      ROS:  No cough, SOB, CP  No N/V/D./abd pain  No other complaints      Antimicrobials:    fluconAZOLE IVPB 200 milliGRAM(s) IV Intermittent every 24 hours  meropenem  IVPB 1000 milliGRAM(s) IV Intermittent every 8 hours  nystatin    Suspension 937278 Unit(s) Oral every 6 hours  vancomycin  IVPB 1000 milliGRAM(s) IV Intermittent every 12 hours      Vital Signs Last 24 Hrs  T(C): 36.8 (09-18-22 @ 12:00), Max: 37.2 (09-18-22 @ 04:00)  T(F): 98.2 (09-18-22 @ 12:00), Max: 98.9 (09-18-22 @ 04:00)  HR: 95 (09-18-22 @ 12:00) (80 - 100)  BP: --  BP(mean): --  RR: 29 (09-18-22 @ 12:00) (10 - 33)  SpO2: 96% (09-18-22 @ 12:00) (92% - 100%)    Physical Exam:    Constitutional Obese, NAD    HEENT EOMI, No pallor or icterus    No oral exudate or erythema    Neck supple no LN    Chest Clear to auscultation, mediport, sternal wound dressing dry and intact    CVS S1 S2 WNl No murmur or rub or gallop    Abd soft BS normal No tenderness no masses, colostomy    Ext No cyanosis clubbing or edema, Right LE dressing    IV site no erythema tenderness or discharge    Joints no swelling or LOM    CNS AAO X 3 non focal    Lab Data:                          9.9    24.17 )-----------( 344      ( 18 Sep 2022 00:42 )             33.3       09-18    135  |  101  |  18  ----------------------------<  137<H>  4.5   |  24  |  0.64    Ca    8.8      18 Sep 2022 00:42  Phos  2.5     09-18  Mg     1.9     09-18    TPro  6.1  /  Alb  2.9<L>  /  TBili  0.3  /  DBili  x   /  AST  36  /  ALT  94<H>  /  AlkPhos  95  09-18        Catheterized Catheterized  09-16-22   Culture in progress  --  --      .Blood Blood-Peripheral  09-16-22   No growth to date.  --  --      .Blood Blood  09-12-22   No Growth Final  --  --      Vancomycin Level, Trough: 21.0 ug/mL (09-18-22 @ 05:54)  Vancomycin Level, Trough: 14.5 ug/mL (09-17-22 @ 20:07)  Vancomycin Level, Trough: 9.2 ug/mL (09-17-22 @ 10:00)  Vancomycin Level, Trough: 6.7 ug/mL (09-16-22 @ 21:34)    < from: Xray Chest 1 View- PORTABLE-Routine (Xray Chest 1 View- PORTABLE-Routine in AM.) (09.17.22 @ 02:03) >  ACC: [id number] EXAM:  XR CHEST PORTABLE ROUTINE 1V                          PROCEDURE DATE:  09/17/2022          INTERPRETATION:  CLINICAL INDICATION: Post cardiothoracic surgery    EXAM: Frontal radiograph of the chest.    COMPARISON: Chest radiograph from 9/16/2022    FINDINGS:  Left chest wall Mediport with tip in the right atrium, enteric tube with   tip in the the proximal stomach, all unchanged.  The lungs are clear. Persistent, small left pleural effusion.  No pneumothorax.  The heart size is not well evaluated. Aortic valve replacement.  Sternotomy wires redemonstrated.    IMPRESSION:  Tubes and lines described as above.  Clear lungs.  Stable small left pleural effusion.    < end of copied text >  < from: CT Abdomen and Pelvis w/ IV Cont (09.12.22 @ 16:31) >  PROCEDURE:  CT of the Chest, Abdomen and Pelvis was performed.  Sagittal and coronal reformats were performed.    FINDINGS:  CHEST:  LUNGS AND LARGE AIRWAYS: Endotracheal tube in place above the level of   the balwinder. There is bilateral dependent atelectasis.  PLEURA: Small bilateral pleural effusions.  VESSELS: Patient is status post repair of an ascending aortic dissection.   There is a persistent dissection flap above the level of the repair   within the arch of the aorta at the level of the RIGHT innominate artery.   No dissection flap is seen within the great vessels. There is a   persistent dissection flap along the RIGHT lateral aspect of the arch of   the aorta extending into the descending aorta. There is partial   thrombosis of the false lumen within the proximal descending aorta (3-64).  HEART: Heart size is normal. There is low-density fluid surrounding the   ascending aorta at the site of the repair. Small amount of fluid is also   seen in the anterior mediastinum consistent with recent surgical   procedure. Internal pacing wires are also identified.  MEDIASTINUM AND MARANDA: No lymphadenopathy.  CHEST WALL AND LOWER NECK: Within normal limits.    ABDOMEN AND PELVIS:  LIVER: Nodular contour suggests cirrhosis.  BILE DUCTS: Normal caliber.  GALLBLADDER: Within normal limits.  SPLEEN: Smallspleen.  PANCREAS: There is a cystic lesion within the tail the pancreas unchanged   from prior study measuring 3.1 cm. Additional multifocal cystic lesions   within the pancreas are consistent with multifocal side branch IPMN.  ADRENALS: Within normal limits.  KIDNEYS/URETERS: No evidence of hydronephrosis.    BLADDER: Amezcua catheter in decompressed bladder.  REPRODUCTIVE ORGANS: Obscured from streak artifact from plate and screws   affixing a pubic symphysis fracture. Probable hysterectomy.    BOWEL: Left-sided descending colon colostomy. Moderate fecal burden   throughout the visualized colon. Mild wall thickening of the cecum. NG   tube is seen within the stomach. Appendix is not visualized. No evidence   of inflammation in the pericecal region.  PERITONEUM: No ascites.  VESSELS: The dissection flap continues into the abdominal aorta with the   celiac and SMA arising from the true lumen. Both renal vessels appear to   arise from the true lumen. The dissection flap may enter the proximal   portion of the LEFT renal artery. The dissection flap extends slightly   into the proximal LEFT common iliac. There is poor visualization of the   proximal portion of the RIGHT common iliac.  RETROPERITONEUM/LYMPH NODES: No lymphadenopathy.  ABDOMINAL WALL: Small fat-containing periumbilical hernia.  BONES: Screw is seen across a RIGHT iliac and sacral healed fracture.   There is also plate and screws bridging and bilateral pubic ramus   fracture.    IMPRESSION:  1.  Status post recent ascending aortic dissection repair. Small amount   of fluid surrounding the ascending aorta without evidence of enhancing   wall to suggest abscess.  2.  Bilateral lower lobe atelectasis with bilateral pleural effusions.  3.  Mild thickening of the cecum and ascending colon possibly   representing mild nonspecific proximal colitis.  4.  Hepatic cirrhosis.  5.  The focal IPMN of the pancreas with large cyst within the tail the   pancreas measuring 3.1 cm. Follow-up recommended.      < end of copied text >           Patient is a 71y old  Female who presents with a chief complaint of Type A aortic dissection (17 Sep 2022 11:57)    Being followed by ID for Pneumonia, Fever    Interval history:  Afebrile after 9/16  No acute events      ROS: Feels well  No cough, SOB, CP  No N/V/D./abd pain  No other complaints      Antimicrobials:    fluconAZOLE IVPB 200 milliGRAM(s) IV Intermittent every 24 hours  meropenem  IVPB 1000 milliGRAM(s) IV Intermittent every 8 hours  nystatin    Suspension [id number] Unit(s) Oral every 6 hours  vancomycin  IVPB 1000 milliGRAM(s) IV Intermittent every 12 hours      Vital Signs Last 24 Hrs  T(C): 36.8 (09-18-22 @ 12:00), Max: 37.2 (09-18-22 @ 04:00)  T(F): 98.2 (09-18-22 @ 12:00), Max: 98.9 (09-18-22 @ 04:00)  HR: 95 (09-18-22 @ 12:00) (80 - 100)  BP: --  BP(mean): --  RR: 29 (09-18-22 @ 12:00) (10 - 33)  SpO2: 96% (09-18-22 @ 12:00) (92% - 100%)    Physical Exam:    Constitutional Obese, NAD    HEENT EOMI, No pallor or icterus    No oral exudate or erythema    Neck supple no LN    Chest Clear to auscultation, mediport, sternal wound dressing dry and intact    CVS S1 S2 WNl No murmur or rub or gallop    Abd soft BS normal No tenderness no masses, colostomy    Ext No cyanosis clubbing or edema, Right LE dressing    IV site no erythema tenderness or discharge    Joints no swelling or LOM    CNS AAO X 3 non focal    Lab Data:                          9.9    24.17 )-----------( 344      ( 18 Sep 2022 00:42 )             33.3       09-18    135  |  101  |  18  ----------------------------<  137<H>  4.5   |  24  |  0.64    Ca    8.8      18 Sep 2022 00:42  Phos  2.5     09-18  Mg     1.9     09-18    TPro  6.1  /  Alb  2.9<L>  /  TBili  0.3  /  DBili  x   /  AST  36  /  ALT  94<H>  /  AlkPhos  95  09-18        Catheterized Catheterized  09-16-22   Culture in progress  --  --      .Blood Blood-Peripheral  09-16-22   No growth to date.  --  --      .Blood Blood  09-12-22   No Growth Final  --  --      Vancomycin Level, Trough: 21.0 ug/mL (09-18-22 @ 05:54)  Vancomycin Level, Trough: 14.5 ug/mL (09-17-22 @ 20:07)  Vancomycin Level, Trough: 9.2 ug/mL (09-17-22 @ 10:00)  Vancomycin Level, Trough: 6.7 ug/mL (09-16-22 @ 21:34)    < from: Xray Chest 1 View- PORTABLE-Routine (Xray Chest 1 View- PORTABLE-Routine in AM.) (09.17.22 @ 02:03) >  ACC: [id number] EXAM:  XR CHEST PORTABLE ROUTINE 1V                          PROCEDURE DATE:  09/17/2022          INTERPRETATION:  CLINICAL INDICATION: Post cardiothoracic surgery    EXAM: Frontal radiograph of the chest.    COMPARISON: Chest radiograph from 9/16/2022    FINDINGS:  Left chest wall Mediport with tip in the right atrium, enteric tube with   tip in the the proximal stomach, all unchanged.  The lungs are clear. Persistent, small left pleural effusion.  No pneumothorax.  The heart size is not well evaluated. Aortic valve replacement.  Sternotomy wires redemonstrated.    IMPRESSION:  Tubes and lines described as above.  Clear lungs.  Stable small left pleural effusion.    < end of copied text >  < from: CT Abdomen and Pelvis w/ IV Cont (09.12.22 @ 16:31) >  PROCEDURE:  CT of the Chest, Abdomen and Pelvis was performed.  Sagittal and coronal reformats were performed.    FINDINGS:  CHEST:  LUNGS AND LARGE AIRWAYS: Endotracheal tube in place above the level of   the balwinder. There is bilateral dependent atelectasis.  PLEURA: Small bilateral pleural effusions.  VESSELS: Patient is status post repair of an ascending aortic dissection.   There is a persistent dissection flap above the level of the repair   within the arch of the aorta at the level of the RIGHT innominate artery.   No dissection flap is seen within the great vessels. There is a   persistent dissection flap along the RIGHT lateral aspect of the arch of   the aorta extending into the descending aorta. There is partial   thrombosis of the false lumen within the proximal descending aorta (3-64).  HEART: Heart size is normal. There is low-density fluid surrounding the   ascending aorta at the site of the repair. Small amount of fluid is also   seen in the anterior mediastinum consistent with recent surgical   procedure. Internal pacing wires are also identified.  MEDIASTINUM AND MARANDA: No lymphadenopathy.  CHEST WALL AND LOWER NECK: Within normal limits.    ABDOMEN AND PELVIS:  LIVER: Nodular contour suggests cirrhosis.  BILE DUCTS: Normal caliber.  GALLBLADDER: Within normal limits.  SPLEEN: Smallspleen.  PANCREAS: There is a cystic lesion within the tail the pancreas unchanged   from prior study measuring 3.1 cm. Additional multifocal cystic lesions   within the pancreas are consistent with multifocal side branch IPMN.  ADRENALS: Within normal limits.  KIDNEYS/URETERS: No evidence of hydronephrosis.    BLADDER: Amezcua catheter in decompressed bladder.  REPRODUCTIVE ORGANS: Obscured from streak artifact from plate and screws   affixing a pubic symphysis fracture. Probable hysterectomy.    BOWEL: Left-sided descending colon colostomy. Moderate fecal burden   throughout the visualized colon. Mild wall thickening of the cecum. NG   tube is seen within the stomach. Appendix is not visualized. No evidence   of inflammation in the pericecal region.  PERITONEUM: No ascites.  VESSELS: The dissection flap continues into the abdominal aorta with the   celiac and SMA arising from the true lumen. Both renal vessels appear to   arise from the true lumen. The dissection flap may enter the proximal   portion of the LEFT renal artery. The dissection flap extends slightly   into the proximal LEFT common iliac. There is poor visualization of the   proximal portion of the RIGHT common iliac.  RETROPERITONEUM/LYMPH NODES: No lymphadenopathy.  ABDOMINAL WALL: Small fat-containing periumbilical hernia.  BONES: Screw is seen across a RIGHT iliac and sacral healed fracture.   There is also plate and screws bridging and bilateral pubic ramus   fracture.    IMPRESSION:  1.  Status post recent ascending aortic dissection repair. Small amount   of fluid surrounding the ascending aorta without evidence of enhancing   wall to suggest abscess.  2.  Bilateral lower lobe atelectasis with bilateral pleural effusions.  3.  Mild thickening of the cecum and ascending colon possibly   representing mild nonspecific proximal colitis.  4.  Hepatic cirrhosis.  5.  The focal IPMN of the pancreas with large cyst within the tail the   pancreas measuring 3.1 cm. Follow-up recommended.      < end of copied text >

## 2022-09-18 NOTE — PROGRESS NOTE ADULT - SUBJECTIVE AND OBJECTIVE BOX
Interval Events:none    REVIEW OF SYSTEMS:  Feels albuterol isnt helping like it does at home to 'open" the lungs  able to take deep breaths without pain    x[ ] All other systems negative  [ ] Unable to assess ROS because ________    OBJECTIVE:  ICU Vital Signs Last 24 Hrs  T(C): 36.8 (18 Sep 2022 12:00), Max: 37.2 (18 Sep 2022 04:00)  T(F): 98.2 (18 Sep 2022 12:00), Max: 98.9 (18 Sep 2022 04:00)  HR: 95 (18 Sep 2022 12:00) (80 - 100)  BP: --  BP(mean): --  ABP: 115/60 (18 Sep 2022 12:00) (101/47 - 137/59)  ABP(mean): 79 (18 Sep 2022 12:00) (64 - 82)  RR: 29 (18 Sep 2022 12:00) (10 - 33)  SpO2: 96% (18 Sep 2022 12:00) (92% - 100%)    O2 Parameters below as of 18 Sep 2022 12:00  Patient On (Oxygen Delivery Method): room air              09-17 @ 07:01  -  09-18 @ 07:00  --------------------------------------------------------  IN: 2616 mL / OUT: 3405 mL / NET: -789 mL    09-18 @ 07:01  -  09-18 @ 12:41  --------------------------------------------------------  IN: 425 mL / OUT: 1550 mL / NET: -1125 mL      CAPILLARY BLOOD GLUCOSE  208 (18 Sep 2022 12:00)      POCT Blood Glucose.: 208 mg/dL (18 Sep 2022 12:05)      PHYSICAL EXAM: sitting in a chair, no distress  General: Awake, alert, oriented X 3.   HEENT: Atraumatic, normocephalic.                 Mallampatti Grade                 No nasal congestion.                No tonsillar or pharyngeal exudates.  Lymph Nodes: No palpable lymphadenopathy  Neck: No JVD. No carotid bruit.   Respiratory: clear lungs, normal expansion  Cardiovascular: S1 S2 normal. No murmurs, rubs or gallops. sternal dressing  Abdomen: Soft, non-tender, non-distended. No organomegaly.  Extremities: Warm to touch. Peripheral pulse palpable. No pedal edema.   Skin: No rashes or skin lesions  Neurological: Motor and sensory examination equal and normal in all four extremities.  Psychiatry: Appropriate mood and affect.    HOSPITAL MEDICATIONS:  MEDICATIONS  (STANDING):  albuterol/ipratropium for Nebulization 3 milliLiter(s) Nebulizer every 6 hours  apixaban 5 milliGRAM(s) Oral every 12 hours  buDESOnide    Inhalation Suspension 0.5 milliGRAM(s) Inhalation every 12 hours  chlorhexidine 2% Cloths 1 Application(s) Topical <User Schedule>  collagenase Ointment 1 Application(s) Topical daily  fluconAZOLE IVPB 200 milliGRAM(s) IV Intermittent every 24 hours  furosemide    Tablet 20 milliGRAM(s) Oral daily  insulin regular Infusion 3 Unit(s)/Hr (3 mL/Hr) IV Continuous <Continuous>  meropenem  IVPB 1000 milliGRAM(s) IV Intermittent every 8 hours  metoprolol tartrate 25 milliGRAM(s) Oral every 8 hours  mexiletine 200 milliGRAM(s) Oral every 8 hours  montelukast 10 milliGRAM(s) Oral at bedtime  multivitamin 1 Tablet(s) Oral daily  nystatin    Suspension 106470 Unit(s) Oral every 6 hours  pantoprazole  Injectable 40 milliGRAM(s) IV Push daily  predniSONE   Tablet 8 milliGRAM(s) Oral daily  QUEtiapine 25 milliGRAM(s) Oral at bedtime  spironolactone 25 milliGRAM(s) Oral every 12 hours  vancomycin  IVPB 1000 milliGRAM(s) IV Intermittent every 12 hours    MEDICATIONS  (PRN):      LABS:                        9.9    24.17 )-----------( 344      ( 18 Sep 2022 00:42 )             33.3     09-18    135  |  101  |  18  ----------------------------<  137<H>  4.5   |  24  |  0.64    Ca    8.8      18 Sep 2022 00:42  Phos  2.5     09-18  Mg     1.9     09-18    TPro  6.1  /  Alb  2.9<L>  /  TBili  0.3  /  DBili  x   /  AST  36  /  ALT  94<H>  /  AlkPhos  95  09-18    PT/INR - ( 17 Sep 2022 01:08 )   PT: 21.0 sec;   INR: 1.82 ratio         PTT - ( 17 Sep 2022 01:08 )  PTT:30.3 sec    Arterial Blood Gas:  09-18 @ 06:15  7.48/38/85/28/97.5/4.5  ABG lactate: --  Arterial Blood Gas:  09-18 @ 01:36  7.51/35/140/28/98.1/4.8  ABG lactate: --  Arterial Blood Gas:  09-18 @ 00:30  7.48/38/66/28/94.2/4.5  ABG lactate: --  Arterial Blood Gas:  09-17 @ 00:32  7.48/37/99/28/98.4/3.9  ABG lactate: --

## 2022-09-18 NOTE — PROGRESS NOTE ADULT - ASSESSMENT
70 y/o woman, complex medical hx, including TBM and severe persist asthma, tx to [facility] for TAA repair  Complicated post op course, respiratory and infectious, much improved  Patient now stable on room air, lungs are clear  -duoneb q6 and budesonide bid, singulair, and her baseline dose prednisone  -OOB, incentive spirometry, acapella  care per CTS

## 2022-09-18 NOTE — PROGRESS NOTE ADULT - ASSESSMENT
71 f with DM, CAD, a-fib, asthma/COPD, Chronic Adrenal Insufficiency on steroids, history of colorectal cancer s/p resection and ostomy, tracheomalacia and multiple intubations, recent admission for sepsis, foot osteo and abscess s/p debridement and OR cx with MRSA, ESBL proteus and corynebacterium s/p 6 weeks of vanco and ertapenem which ended 8/17, now p/w chest pain, found to have  type A aortic dissection, s/p modified Bentall and hemiarch on 9/6/22.   Post op course complicated by shock, respiratory failure, anemia and hyperglycemia.   fever started 9/9, sputum cx with proteus mirabilis    fever post op for aortic dissection, sputum cx with proteus, pt was still febrile on zosyn but last wound cx that showed proteus was ESBL, switched to suraj and still persistently febrile and the proteus now is pan sensitive, chest /abd CT 9/12 with small fluid around the ascending aorta but no enhancing or abscess, b/l lower lobe atelectasis, ?mild colitis  doppler: thrombosed and occluded RIJ  adrenal insufficiency, was on prednisone, was given dexa 9/12 and 9/13 and no more fevers until 16th, extubated 9/13 so the persistent fevers could be in the setting of adrenal insufficiency as imaging and cx negative, pt already extubated and no focal symptoms  blood and urine cx negative, ALT, AST increased today to 200s, no bili or ALK, unlikely due to suraj  penicillin and cefepime allergy in chart but pt has received cefepime and ceftriaxone before    * was on cefepime 9/9, switched to suraj 9/10, and then ceftriaxone on 9/14 to complete the course but pt became febrile again and continues to spike although clinically well, non toxic  * Continue  vanco, suraj for now  * f/u the blood and urine cx  * Yeast in the urine is not likely responsible for patient's fevers and urine cx was negative 9/10, fluconazole started of questionable value  *Persistent leucocytosis, may be demargination, colitis seen on CT, if diarrhea, check C diff PCR  * endocrine eval for adrenal insufficiency  * monitor WBC/diff and renal function      Efren Ceron MD  pager [phone]  office [phone]  Over the weekend please call [phone]         71 f with DM, CAD, a-fib, asthma/COPD, Chronic Adrenal Insufficiency on steroids, history of colorectal cancer s/p resection and ostomy, tracheomalacia and multiple intubations, recent admission for sepsis, foot osteo and abscess s/p debridement and OR cx with MRSA, ESBL proteus and corynebacterium s/p 6 weeks of vanco and ertapenem which ended 8/17, now p/w chest pain, found to have  type A aortic dissection, s/p modified Bentall and hemiarch on 9/6/22.   Post op course complicated by shock, respiratory failure, anemia and hyperglycemia.   fever started 9/9, sputum cx with proteus mirabilis    fever post op for aortic dissection, sputum cx with proteus, pt was still febrile on zosyn but last wound cx that showed proteus was ESBL, switched to suraj and still persistently febrile and the proteus now is pan sensitive, chest /abd CT 9/12 with small fluid around the ascending aorta but no enhancing or abscess, b/l lower lobe atelectasis, ?mild colitis  doppler: thrombosed and occluded RIJ  adrenal insufficiency, was on prednisone, was given dexa 9/12 and 9/13 and no more fevers until 16th, extubated 9/13 so the persistent fevers could be in the setting of adrenal insufficiency as imaging and cx negative, pt already extubated and no focal symptoms  blood and urine cx negative, ALT, AST increased today to 200s, no bili or ALK, unlikely due to suraj  penicillin and cefepime allergy in chart but pt has received cefepime and ceftriaxone before    * was on cefepime 9/9, switched to suraj 9/10, and then ceftriaxone on 9/14 to complete the course but pt became febrile again and continues to spike although clinically well, non toxic  * Continue  vanco, suraj for now  * f/u the blood and urine cx  * Yeast in the urine is not likely responsible for patient's fevers and urine cx was negative 9/10, fluconazole started of questionable value  *Persistent leucocytosis, may be demargination, colitis seen on CT, no large volume output from colostomy abdomen soft, not distended, check C diff PCR if increases  * endocrine eval for adrenal insufficiency  * monitor WBC/diff and renal function      Efren Ceron MD  pager [phone]  office [phone]  Over the weekend please call [phone]

## 2022-09-18 NOTE — PROGRESS NOTE ADULT - SUBJECTIVE AND OBJECTIVE BOX
Patient seen and examined at the bedside.    Remained critically ill on continuous ICU monitoring.    OBJECTIVE:  ICU Vital Signs Last 24 Hrs  T(C): 36.8 (18 Sep 2022 12:00), Max: 37.2 (18 Sep 2022 04:00)  T(F): 98.2 (18 Sep 2022 12:00), Max: 98.9 (18 Sep 2022 04:00)  HR: 89 (18 Sep 2022 14:00) (80 - 100)  BP: --  BP(mean): --  ABP: 114/56 (18 Sep 2022 14:00) (101/47 - 137/59)  ABP(mean): 75 (18 Sep 2022 14:00) (64 - 82)  RR: 25 (18 Sep 2022 14:00) (10 - 33)  SpO2: 95% (18 Sep 2022 14:00) (92% - 100%)    O2 Parameters below as of 18 Sep 2022 12:00  Patient On (Oxygen Delivery Method): room air              Physical Exam:   General: obese, elderly female, OOBTC, NAD  Neurology: arousable, following commands   Eyes: right pupil reactive to light, left surgical pupil  ENT/Neck: Neck supple, trachea midline, No JVD  Respiratory: Clear bilaterally   CV: S1S2, no murmurs        [x] Sternal dressing        [x] NSR  Abdominal: Soft, NT, ND, colostomy in place  Extremities: trace pedal edema noted, + peripheral pulses   Skin: Dry dressing over right heel, no Rashes, Hematoma, Ecchymosis                              Assessment:  71F PMH DM, COPD, Chronic Adrenal Insufficiency on Chronic prednisone, history of colorectal cancer s/p resection (colostomy bag), Hx of CAD, Chronic A fib on Eliquis, and tracheomalacia and multiple intubations, recent dx of OM presented to ED at [facility] this morning with epigastric pain, belching and central chest pain. Found on CTA to have type A aortic dissection and transferred to [facility] for surgical evaluation by Dr. Cabrera.     Ascending aortic dissection s/p modified bentall and hemiarch on 9/6   Hypovolemic shock   Post op respiratory insufficiency  Acute blood loss anemia  Stress hyperglycemia   RIJ thrombus  Pancreatic cyst    Proteus PNA  Leukocytosis      Plan:   ***Neuro***  [x] Nonfocal   Post operative neuro assessment   Continue Seroquel, improvement in mental status        ***Cardiovascular***  CT Chest on 9/12:  Status post recent ascending aortic dissection repair. Small amount of fluid surrounding the ascending aorta without evidence of enhancing wall to suggest abscess.  RUE duplex on 9/12: There is a thrombosed and occluded RIJ   Invasive hemodynamic monitoring, assess perfusion indices   NSR /MAP 65/ Hct 34.7/ Lactate 1.7  Blood pressure management with Lopressor   Mexiletine for hx of afib   [x] AC therapy with Eliquis     Serial EKG and cardiac enzymes   Lisinopril dc'ed due to lower BP      ***Pulmonary***  CT Chest on 9/12: Bilateral lower lobe atelectasis with bilateral pleural effusions   Reintubated for change in mental status on 9/8, self extubated on 9/9 and reintubated again, extubated to HFO2 on 9/13, now sating on room air in 90s  Encourage incentive spirometry, continue pulse ox monitoring, follow ABGs   Hx of COPD / Continue bronchodilators and Prednisone   Monitor secretions and suction PRN               ***GI***  CT A/P on 9/12: Mild thickening of the cecum and ascending colon possibly representing mild nonspecific proximal colitis. Hepatic cirrhosis. The focal IPMN of the pancreas with large cyst within the tail the pancreas measuring 3.1 cm.  Colostomy bag in place, continue to monitor output   [x] Diet: Restarted TFs Glucerna at goal 60 cc/hr  [x] Protonix       ***Renal***  Continue to monitor I/Os, BUN/Creatinine.   Replete lytes PRN  Amezcua present  Continue diuresis with Lasix and Aldactone      ***ID***  SCx on 9/8 +Proteus mirabilis   UA on 9/9+ LE, WBC 60, few yeast  /  UCx on 9/10 NG   Vancomycin for Empiric Dosing  BCx on 9/10 NGTD, BCx on 9/12 NGTD 9/16 bcx NGTD  Nystatin for thrush   Diflucan added for Yeast found in Urine on 9/16  Continue Meropenem and IV Vanco     ***Endocrine***  [x]  DM : HbA1c 9.4%                - [x] Insulin gtt              - Need tight glycemic control to prevent wound infection.        Patient requires continuous monitoring with bedside rhythm monitoring, pulse oximetry monitoring, and continuous central venous and arterial pressure monitoring; and intermittent blood gas analysis. Care plan discussed with the ICU care team.   Patient remained critical, at risk for life threatening decompensation.    I have spent 30 minutes providing critical care management to this patient. Patient seen and examined at the bedside.    Remained critically ill on continuous ICU monitoring.    OBJECTIVE:  ICU Vital Signs Last 24 Hrs  T(C): 36.8 (18 Sep 2022 12:00), Max: 37.2 (18 Sep 2022 04:00)  T(F): 98.2 (18 Sep 2022 12:00), Max: 98.9 (18 Sep 2022 04:00)  HR: 89 (18 Sep 2022 14:00) (80 - 100)  BP: --  BP(mean): --  ABP: 114/56 (18 Sep 2022 14:00) (101/47 - 137/59)  ABP(mean): 75 (18 Sep 2022 14:00) (64 - 82)  RR: 25 (18 Sep 2022 14:00) (10 - 33)  SpO2: 95% (18 Sep 2022 14:00) (92% - 100%)    O2 Parameters below as of 18 Sep 2022 12:00  Patient On (Oxygen Delivery Method): room air              Physical Exam:   General: obese, elderly female, OOBTC, NAD  Neurology: arousable, following commands   Eyes: right pupil reactive to light, left surgical pupil  ENT/Neck: Neck supple, trachea midline, No JVD  Respiratory: Clear bilaterally   CV: S1S2, no murmurs        [x] Sternal dressing        [x] NSR  Abdominal: Soft, NT, ND, colostomy in place  Extremities: trace pedal edema noted, + peripheral pulses   Skin: Dry dressing over right heel, no Rashes, Hematoma, Ecchymosis                              Assessment:  71F PMH DM, COPD, Chronic Adrenal Insufficiency on Chronic prednisone, history of colorectal cancer s/p resection (colostomy bag), Hx of CAD, Chronic A fib on Eliquis, and tracheomalacia and multiple intubations, recent dx of OM presented to ED at [facility] this morning with epigastric pain, belching and central chest pain. Found on CTA to have type A aortic dissection and transferred to [facility] for surgical evaluation by Dr. Cabrera.     Ascending aortic dissection s/p modified bentall and hemiarch on 9/6   Hypovolemic shock   Post op respiratory insufficiency  Acute blood loss anemia  Stress hyperglycemia   RIJ thrombus  Pancreatic cyst    Proteus PNA  Leukocytosis      Plan:   ***Neuro***  [x] Nonfocal   Post operative neuro assessment   Continue Seroquel, improvement in mental status        ***Cardiovascular***  CT Chest on 9/12:  Status post recent ascending aortic dissection repair. Small amount of fluid surrounding the ascending aorta without evidence of enhancing wall to suggest abscess.  RUE duplex on 9/12: There is a thrombosed and occluded RIJ   Invasive hemodynamic monitoring, assess perfusion indices   NSR /MAP 65/ Hct 34.7/ Lactate 1.7  Blood pressure management with Lopressor   Mexiletine for hx of afib   [x] AC therapy with Eliquis     Serial EKG and cardiac enzymes   Lisinopril dc'ed due to lower BP      ***Pulmonary***  CT Chest on 9/12: Bilateral lower lobe atelectasis with bilateral pleural effusions   Reintubated for change in mental status on 9/8, self extubated on 9/9 and reintubated again, extubated to HFO2 on 9/13, now sating on room air in 90s  Encourage incentive spirometry, continue pulse ox monitoring, follow ABGs   Hx of COPD / Continue bronchodilators and Prednisone   Monitor secretions and suction PRN               ***GI***  CT A/P on 9/12: Mild thickening of the cecum and ascending colon possibly representing mild nonspecific proximal colitis. Hepatic cirrhosis. The focal IPMN of the pancreas with large cyst within the tail the pancreas measuring 3.1 cm.  Colostomy bag in place, continue to monitor output   [x] Diet: Restarted TFs Glucerna at goal 60 cc/hr  [x] Protonix   --> FEES maybe Tuesday this week. keep NPO w/ TF till then     ***Renal***  Continue to monitor I/Os, BUN/Creatinine.   Replete lytes PRN  Amezcua present  Continue diuresis with Lasix and Aldactone      ***ID***  SCx on 9/8 +Proteus mirabilis   UA on 9/9+ LE, WBC 60, few yeast  /  UCx on 9/10 NG   Vancomycin for Empiric Dosing  BCx on 9/10 NGTD, BCx on 9/12 NGTD 9/16 bcx NGTD  Nystatin for thrush   Diflucan added for Yeast found in Urine on 9/16  Continue Meropenem and IV Vanco     ***Endocrine***  [x]  DM : HbA1c 9.4%                - [x] Insulin gtt              - Need tight glycemic control to prevent wound infection.        Patient requires continuous monitoring with bedside rhythm monitoring, pulse oximetry monitoring, and continuous central venous and arterial pressure monitoring; and intermittent blood gas analysis. Care plan discussed with the ICU care team.   Patient remained critical, at risk for life threatening decompensation.    I have spent 30 minutes providing critical care management to this patient.

## 2022-09-19 DIAGNOSIS — E11.65 TYPE 2 DIABETES MELLITUS WITH HYPERGLYCEMIA: ICD-10-CM

## 2022-09-19 LAB
ALBUMIN SERPL ELPH-MCNC: 3.3 G/DL — SIGNIFICANT CHANGE UP (ref 3.3–5)
ALP SERPL-CCNC: 101 U/L — SIGNIFICANT CHANGE UP (ref 40–120)
ALT FLD-CCNC: 89 U/L — HIGH (ref 10–45)
ANION GAP SERPL CALC-SCNC: 7 MMOL/L — SIGNIFICANT CHANGE UP (ref 5–17)
AST SERPL-CCNC: 32 U/L — SIGNIFICANT CHANGE UP (ref 10–40)
BILIRUB SERPL-MCNC: 0.3 MG/DL — SIGNIFICANT CHANGE UP (ref 0.2–1.2)
BUN SERPL-MCNC: 21 MG/DL — SIGNIFICANT CHANGE UP (ref 7–23)
CALCIUM SERPL-MCNC: 8.7 MG/DL — SIGNIFICANT CHANGE UP (ref 8.4–10.5)
CHLORIDE SERPL-SCNC: 104 MMOL/L — SIGNIFICANT CHANGE UP (ref 96–108)
CO2 SERPL-SCNC: 27 MMOL/L — SIGNIFICANT CHANGE UP (ref 22–31)
CREAT SERPL-MCNC: 0.75 MG/DL — SIGNIFICANT CHANGE UP (ref 0.5–1.3)
EGFR: 84 ML/MIN/1.73M2 — SIGNIFICANT CHANGE UP
GAS PNL BLDA: SIGNIFICANT CHANGE UP
GLUCOSE BLDC GLUCOMTR-MCNC: 107 MG/DL — HIGH (ref 70–99)
GLUCOSE BLDC GLUCOMTR-MCNC: 116 MG/DL — HIGH (ref 70–99)
GLUCOSE BLDC GLUCOMTR-MCNC: 120 MG/DL — HIGH (ref 70–99)
GLUCOSE BLDC GLUCOMTR-MCNC: 127 MG/DL — HIGH (ref 70–99)
GLUCOSE BLDC GLUCOMTR-MCNC: 148 MG/DL — HIGH (ref 70–99)
GLUCOSE BLDC GLUCOMTR-MCNC: 167 MG/DL — HIGH (ref 70–99)
GLUCOSE BLDC GLUCOMTR-MCNC: 176 MG/DL — HIGH (ref 70–99)
GLUCOSE BLDC GLUCOMTR-MCNC: 187 MG/DL — HIGH (ref 70–99)
GLUCOSE BLDC GLUCOMTR-MCNC: 193 MG/DL — HIGH (ref 70–99)
GLUCOSE BLDC GLUCOMTR-MCNC: 195 MG/DL — HIGH (ref 70–99)
GLUCOSE BLDC GLUCOMTR-MCNC: 220 MG/DL — HIGH (ref 70–99)
GLUCOSE BLDC GLUCOMTR-MCNC: 230 MG/DL — HIGH (ref 70–99)
GLUCOSE BLDC GLUCOMTR-MCNC: 302 MG/DL — HIGH (ref 70–99)
GLUCOSE BLDC GLUCOMTR-MCNC: 99 MG/DL — SIGNIFICANT CHANGE UP (ref 70–99)
GLUCOSE SERPL-MCNC: 99 MG/DL — SIGNIFICANT CHANGE UP (ref 70–99)
HCT VFR BLD CALC: 34.2 % — LOW (ref 34.5–45)
HGB BLD-MCNC: 10.4 G/DL — LOW (ref 11.5–15.5)
MAGNESIUM SERPL-MCNC: 2.6 MG/DL — SIGNIFICANT CHANGE UP (ref 1.6–2.6)
MCHC RBC-ENTMCNC: 23.6 PG — LOW (ref 27–34)
MCHC RBC-ENTMCNC: 30.4 GM/DL — LOW (ref 32–36)
MCV RBC AUTO: 77.7 FL — LOW (ref 80–100)
NRBC # BLD: 1 /100 WBCS — HIGH (ref 0–0)
PHOSPHATE SERPL-MCNC: 2.9 MG/DL — SIGNIFICANT CHANGE UP (ref 2.5–4.5)
PLATELET # BLD AUTO: 374 K/UL — SIGNIFICANT CHANGE UP (ref 150–400)
POTASSIUM SERPL-MCNC: 4.6 MMOL/L — SIGNIFICANT CHANGE UP (ref 3.5–5.3)
POTASSIUM SERPL-SCNC: 4.6 MMOL/L — SIGNIFICANT CHANGE UP (ref 3.5–5.3)
PROT SERPL-MCNC: 6.5 G/DL — SIGNIFICANT CHANGE UP (ref 6–8.3)
RBC # BLD: 4.4 M/UL — SIGNIFICANT CHANGE UP (ref 3.8–5.2)
RBC # FLD: SIGNIFICANT CHANGE UP (ref 10.3–14.5)
SODIUM SERPL-SCNC: 138 MMOL/L — SIGNIFICANT CHANGE UP (ref 135–145)
VANCOMYCIN TROUGH SERPL-MCNC: 15 UG/ML — SIGNIFICANT CHANGE UP (ref 10–20)
WBC # BLD: 17.26 K/UL — HIGH (ref 3.8–10.5)
WBC # FLD AUTO: 17.26 K/UL — HIGH (ref 3.8–10.5)

## 2022-09-19 PROCEDURE — 99232 SBSQ HOSP IP/OBS MODERATE 35: CPT

## 2022-09-19 PROCEDURE — 71045 X-RAY EXAM CHEST 1 VIEW: CPT | Mod: 26

## 2022-09-19 PROCEDURE — 93010 ELECTROCARDIOGRAM REPORT: CPT

## 2022-09-19 PROCEDURE — 99291 CRITICAL CARE FIRST HOUR: CPT | Mod: 24

## 2022-09-19 RX ORDER — IPRATROPIUM/ALBUTEROL SULFATE 18-103MCG
3 AEROSOL WITH ADAPTER (GRAM) INHALATION EVERY 12 HOURS
Refills: 0 | Status: DISCONTINUED | OUTPATIENT
Start: 2022-09-19 | End: 2022-09-28

## 2022-09-19 RX ORDER — HUMAN INSULIN 100 [IU]/ML
28 INJECTION, SUSPENSION SUBCUTANEOUS EVERY 6 HOURS
Refills: 0 | Status: DISCONTINUED | OUTPATIENT
Start: 2022-09-19 | End: 2022-09-20

## 2022-09-19 RX ORDER — HUMAN INSULIN 100 [IU]/ML
20 INJECTION, SUSPENSION SUBCUTANEOUS EVERY 6 HOURS
Refills: 0 | Status: DISCONTINUED | OUTPATIENT
Start: 2022-09-19 | End: 2022-09-19

## 2022-09-19 RX ORDER — INSULIN LISPRO 100/ML
VIAL (ML) SUBCUTANEOUS
Refills: 0 | Status: DISCONTINUED | OUTPATIENT
Start: 2022-09-19 | End: 2022-09-19

## 2022-09-19 RX ORDER — INSULIN LISPRO 100/ML
VIAL (ML) SUBCUTANEOUS EVERY 6 HOURS
Refills: 0 | Status: DISCONTINUED | OUTPATIENT
Start: 2022-09-19 | End: 2022-09-24

## 2022-09-19 RX ORDER — HUMAN INSULIN 100 [IU]/ML
24 INJECTION, SUSPENSION SUBCUTANEOUS EVERY 6 HOURS
Refills: 0 | Status: DISCONTINUED | OUTPATIENT
Start: 2022-09-19 | End: 2022-09-19

## 2022-09-19 RX ADMIN — Medication 20 MILLIGRAM(S): at 05:30

## 2022-09-19 RX ADMIN — APIXABAN 5 MILLIGRAM(S): 2.5 TABLET, FILM COATED ORAL at 11:16

## 2022-09-19 RX ADMIN — Medication 0.5 MILLIGRAM(S): at 17:36

## 2022-09-19 RX ADMIN — Medication 3 MILLILITER(S): at 09:57

## 2022-09-19 RX ADMIN — Medication 25 MILLIGRAM(S): at 05:31

## 2022-09-19 RX ADMIN — SPIRONOLACTONE 25 MILLIGRAM(S): 25 TABLET, FILM COATED ORAL at 17:33

## 2022-09-19 RX ADMIN — PANTOPRAZOLE SODIUM 40 MILLIGRAM(S): 20 TABLET, DELAYED RELEASE ORAL at 11:18

## 2022-09-19 RX ADMIN — HUMAN INSULIN 20 UNIT(S): 100 INJECTION, SUSPENSION SUBCUTANEOUS at 12:25

## 2022-09-19 RX ADMIN — FLUCONAZOLE 100 MILLIGRAM(S): 150 TABLET ORAL at 03:28

## 2022-09-19 RX ADMIN — Medication 500000 UNIT(S): at 17:33

## 2022-09-19 RX ADMIN — Medication 675 MILLIGRAM(S): at 20:21

## 2022-09-19 RX ADMIN — Medication 250 MILLIGRAM(S): at 05:35

## 2022-09-19 RX ADMIN — MEXILETINE HYDROCHLORIDE 200 MILLIGRAM(S): 150 CAPSULE ORAL at 05:31

## 2022-09-19 RX ADMIN — Medication 8 MILLIGRAM(S): at 05:35

## 2022-09-19 RX ADMIN — Medication 1 TABLET(S): at 11:16

## 2022-09-19 RX ADMIN — MEXILETINE HYDROCHLORIDE 200 MILLIGRAM(S): 150 CAPSULE ORAL at 22:31

## 2022-09-19 RX ADMIN — Medication 25 MILLIGRAM(S): at 22:31

## 2022-09-19 RX ADMIN — Medication 3 MILLILITER(S): at 23:10

## 2022-09-19 RX ADMIN — Medication 3 MILLILITER(S): at 17:35

## 2022-09-19 RX ADMIN — Medication 8: at 17:44

## 2022-09-19 RX ADMIN — MONTELUKAST 10 MILLIGRAM(S): 4 TABLET, CHEWABLE ORAL at 22:31

## 2022-09-19 RX ADMIN — Medication 3 MILLILITER(S): at 14:24

## 2022-09-19 RX ADMIN — Medication 3 MILLILITER(S): at 05:24

## 2022-09-19 RX ADMIN — HUMAN INSULIN 24 UNIT(S): 100 INJECTION, SUSPENSION SUBCUTANEOUS at 17:50

## 2022-09-19 RX ADMIN — QUETIAPINE FUMARATE 25 MILLIGRAM(S): 200 TABLET, FILM COATED ORAL at 22:31

## 2022-09-19 RX ADMIN — Medication 1 APPLICATION(S): at 11:18

## 2022-09-19 RX ADMIN — Medication 0.5 MILLIGRAM(S): at 05:24

## 2022-09-19 RX ADMIN — Medication 25 MILLIGRAM(S): at 14:25

## 2022-09-19 RX ADMIN — Medication 500000 UNIT(S): at 05:35

## 2022-09-19 RX ADMIN — MEROPENEM 100 MILLIGRAM(S): 1 INJECTION INTRAVENOUS at 05:30

## 2022-09-19 RX ADMIN — Medication 500000 UNIT(S): at 11:18

## 2022-09-19 RX ADMIN — SPIRONOLACTONE 25 MILLIGRAM(S): 25 TABLET, FILM COATED ORAL at 05:30

## 2022-09-19 RX ADMIN — MEXILETINE HYDROCHLORIDE 200 MILLIGRAM(S): 150 CAPSULE ORAL at 14:25

## 2022-09-19 RX ADMIN — CHLORHEXIDINE GLUCONATE 1 APPLICATION(S): 213 SOLUTION TOPICAL at 05:36

## 2022-09-19 NOTE — PROGRESS NOTE ADULT - ASSESSMENT
71F w/h/o uncontrolled T2DM (A1C 9.4%) on basal/bolus insulin PTA. Unknown DM complications. Also COPD, secondary adrenal Insufficiency on chronic steroids, colorectal cancer s/p resection (colostomy bag), Chronic A fib on Eliquis, and tracheomalacia and multiple intubations, recent dx of OM presented to ED at [facility] with epigastric pain, belching and central chest pain. Found on CTA to have type A aortic dissection and transferred to [facility] for surgical evaluation by Dr. Cabrera. Now s/p aortic dissection repair on 9/07/22. Endocrine consulted for assistance with transition from insulin drip to subcutaneous insulin while on TFs. On and off TFs > spoke to team to try to avoid restarting insulin drip for one BG >200 and instead increase NPH insulin dose to BG goal 100 to 180s. Increased NPH dose and will restart sq insulin today.       home medications: Lantus 35 units sq qhs, novolog TID (based on carb count usually around 10 units)

## 2022-09-19 NOTE — PROGRESS NOTE ADULT - SUBJECTIVE AND OBJECTIVE BOX
DIABETES FOLLOW UP NOTE: Saw pt earlier today    Chief Complaint: Endocrine consult requested for management of T2DM    INTERVAL HX: Pt stable, reports tolerating POs and disliking scramble eggs> States she gets these eggs every day and doesn't want them anymore but pt has been NPO and on TFs since 9/16. BG at goal while on insulin drip requiring 0 to 6 units/hr in the last 24 hours. Remains on insulin drip at time of visit. Pt has been transitioned from iv insulin to sq insulin but primary team restated insulin drip yesterday for BG >200 x1. No hypoglycemia. On Prednisone 8mg daily      Review of Systems:  General: As above  Answers questions but not sure pt providing accurate answers. Pt states she had scramble eggs today but she has been NPO.   Cardiovascular: No chest pain, palpitations  Respiratory: No SOB, no cough  GI: No nausea, vomiting, abdominal pain  Endocrine: No S&Sx of hypoglycemia    Allergies    aspirin (Short breath)  Avelox (Short breath; Pruritus)  cefepime (Anaphylaxis)  codeine (Short breath)  Dilaudid (Short breath)  iodine (Short breath; Swelling)  penicillin (Anaphylaxis)  shellfish (Anaphylaxis)  tetanus toxoid (Short breath)  Valium (Short breath)    Intolerances      MEDICATIONS:  insulin NPH human recombinant 20 Unit(s) SubCutaneous every 6 hours  insulin regular Infusion 3 Unit(s)/Hr (3 mL/Hr) IV Continuous <Continuous>  predniSONE   Tablet 8 milliGRAM(s) Oral daily        PHYSICAL EXAM:  VITALS: T(C): 36.7 (09-19-22 @ 12:00)  T(F): 98.1 (09-19-22 @ 12:00), Max: 98.4 (09-18-22 @ 20:00)  HR: 97 (09-19-22 @ 15:00) (76 - 110)  BP: --  RR:  (16 - 29)  SpO2:  (93% - 100%)  Wt(kg): --  GENERAL: Female lying in bed in NAD  HEENT: NGT in place with TFs at 60cc/hr at time of visit  CHEST: Surgical site D&I  Abdomen: Soft, nontender, non distended  Extremities: Warm, no edema in all 4 exts  NEURO: Alert answering questions but appears somewhat confused. Believes she is eating but has been NPO for the past 3 days    LABS:  POCT Blood Glucose.: 120 mg/dL (09-19-22 @ 12:24)  POCT Blood Glucose.: 116 mg/dL (09-19-22 @ 10:57)  POCT Blood Glucose.: 195 mg/dL (09-19-22 @ 10:07)  POCT Blood Glucose.: 176 mg/dL (09-19-22 @ 09:08)  POCT Blood Glucose.: 167 mg/dL (09-19-22 @ 08:06)  POCT Blood Glucose.: 148 mg/dL (09-19-22 @ 06:55)  POCT Blood Glucose.: 193 mg/dL (09-19-22 @ 05:58)  POCT Blood Glucose.: 187 mg/dL (09-19-22 @ 05:28)  POCT Blood Glucose.: 230 mg/dL (09-19-22 @ 03:58)  POCT Blood Glucose.: 220 mg/dL (09-19-22 @ 02:56)  POCT Blood Glucose.: 127 mg/dL (09-19-22 @ 01:01)  POCT Blood Glucose.: 107 mg/dL (09-19-22 @ 00:35)  POCT Blood Glucose.: 99 mg/dL (09-19-22 @ 00:07)  POCT Blood Glucose.: 115 mg/dL (09-18-22 @ 23:02)  POCT Blood Glucose.: 152 mg/dL (09-18-22 @ 22:20)  POCT Blood Glucose.: 178 mg/dL (09-18-22 @ 21:11)  POCT Blood Glucose.: 138 mg/dL (09-18-22 @ 19:59)  POCT Blood Glucose.: 154 mg/dL (09-18-22 @ 18:52)  POCT Blood Glucose.: 164 mg/dL (09-18-22 @ 17:59)  POCT Blood Glucose.: 127 mg/dL (09-18-22 @ 17:16)  POCT Blood Glucose.: 81 mg/dL (09-18-22 @ 16:29)  POCT Blood Glucose.: 148 mg/dL (09-18-22 @ 13:49)  POCT Blood Glucose.: 141 mg/dL (09-18-22 @ 13:06)  POCT Blood Glucose.: 208 mg/dL (09-18-22 @ 12:05)  POCT Blood Glucose.: 176 mg/dL (09-18-22 @ 11:12)  POCT Blood Glucose.: 154 mg/dL (09-18-22 @ 10:12)  POCT Blood Glucose.: 146 mg/dL (09-18-22 @ 09:18)  POCT Blood Glucose.: 157 mg/dL (09-18-22 @ 07:59)  POCT Blood Glucose.: 180 mg/dL (09-18-22 @ 06:49)  POCT Blood Glucose.: 205 mg/dL (09-18-22 @ 06:06)  POCT Blood Glucose.: 197 mg/dL (09-18-22 @ 05:26)  POCT Blood Glucose.: 171 mg/dL (09-18-22 @ 04:32)  POCT Blood Glucose.: 202 mg/dL (09-18-22 @ 03:47)  POCT Blood Glucose.: 143 mg/dL (09-18-22 @ 02:11)  POCT Blood Glucose.: 133 mg/dL (09-18-22 @ 01:25)  POCT Blood Glucose.: 132 mg/dL (09-18-22 @ 00:34)                          10.4   17.26 )-----------( 374      ( 19 Sep 2022 00:29 )             34.2       09-19    138  |  104  |  21  ----------------------------<  99  4.6   |  27  |  0.75    eGFR: 84    Ca    8.7      09-19  Mg     2.6     09-19  Phos  2.9     09-19    TPro  6.5  /  Alb  3.3  /  TBili  0.3  /  DBili  x   /  AST  32  /  ALT  89<H>  /  AlkPhos  101  09-19      Thyroid Function Tests:  09-06 @ 16:46 TSH 3.30 FreeT4 -- T3 -- Anti TPO -- Anti Thyroglobulin Ab -- TSI --      A1C with Estimated Average Glucose Result: 9.4 % (09-06-22 @ 16:46)  A1C with Estimated Average Glucose Result: 9.2 % (07-11-22 @ 07:36)      Estimated Average Glucose: 223 mg/dL (09-06-22 @ 16:46)  Estimated Average Glucose: 217 mg/dL (07-11-22 @ 07:36)

## 2022-09-19 NOTE — PROGRESS NOTE ADULT - SUBJECTIVE AND OBJECTIVE BOX
CHIEF COMPLAINT: f/up sob, chronic resp failure, TBM, severe persistent asthma, VC dysfunction, Type A aortic dissection s/p repair w/modified "Bentall procedure and hemiarch replacement 9/6-no signif sob but some cough and difficulty w/ secretions    Interval Events: PICU care    REVIEW OF SYSTEMS:  Constitutional: No fevers or chills. No weight loss. + fatigue or generalized malaise.  Eyes: No itching or discharge from the eyes  ENT: No ear pain. No ear discharge. No nasal congestion. No post nasal drip. No epistaxis. No throat pain. No sore throat. No difficulty swallowing.   CV: No chest pain. No palpitations. No lightheadedness or dizziness.   Resp: No dyspnea at rest. No dyspnea on exertion. No orthopnea. No wheezing. + cough. No stridor. No sputum production. No chest pain with respiration.  GI: No nausea. No vomiting. No diarrhea.  MSK: No joint pain or pain in any extremities  Integumentary: No skin lesions. No pedal edema.  Neurological: + gross motor weakness. No sensory changes.  [+ ] All other systems negative  [ ] Unable to assess ROS because ________    OBJECTIVE:  ICU Vital Signs Last 24 Hrs  T(C): 36.2 (19 Sep 2022 00:00), Max: 36.9 (18 Sep 2022 20:00)  T(F): 97.2 (19 Sep 2022 00:00), Max: 98.4 (18 Sep 2022 20:00)  HR: 78 (19 Sep 2022 03:00) (76 - 98)  BP: --  BP(mean): --  ABP: 120/61 (19 Sep 2022 03:00) (93/48 - 120/61)  ABP(mean): 83 (19 Sep 2022 03:00) (61 - 83)  RR: 23 (19 Sep 2022 03:00) (16 - 33)  SpO2: 99% (19 Sep 2022 03:00) (93% - 100%)    O2 Parameters below as of 19 Sep 2022 00:00  Patient On (Oxygen Delivery Method): room air              09-17 @ 07:01  -  09-18 @ 07:00  --------------------------------------------------------  IN: 2616 mL / OUT: 3405 mL / NET: -789 mL    09-18 @ 07:01  -  09-19 @ 05:48  --------------------------------------------------------  IN: 1677 mL / OUT: 3190 mL / NET: -1513 mL      CAPILLARY BLOOD GLUCOSE  220 (19 Sep 2022 03:00)      POCT Blood Glucose.: 187 mg/dL (19 Sep 2022 05:28)      PHYSICAL EXAM: NAD in bed on NC/RA  General: Awake, alert, oriented X 3.   HEENT: Atraumatic, normocephalic.                 Mallampatti Grade 2                No nasal congestion.                No tonsillar or pharyngeal exudates.  Lymph Nodes: No palpable lymphadenopathy  Neck: No JVD. No carotid bruit.   Respiratory: Normal chest expansion                         Normal percussion                         Normal and equal air entry                         No wheeze, ++rhonchi but no rales.  Cardiovascular: S1 S2 normal. No murmurs, rubs or gallops.   Abdomen: Soft, non-tender, non-distended. No organomegaly. Normoactive bowel sounds.  Extremities: Warm to touch. Peripheral pulse palpable. No pedal edema.   Skin: No rashes or skin lesions  Neurological: Motor and sensory examination equal and normal in all four extremities.  Psychiatry: Appropriate mood and affect.    HOSPITAL MEDICATIONS:  MEDICATIONS  (STANDING):  albuterol/ipratropium for Nebulization 3 milliLiter(s) Nebulizer every 6 hours  apixaban 5 milliGRAM(s) Oral every 12 hours  buDESOnide    Inhalation Suspension 0.5 milliGRAM(s) Inhalation every 12 hours  chlorhexidine 2% Cloths 1 Application(s) Topical <User Schedule>  collagenase Ointment 1 Application(s) Topical daily  fluconAZOLE IVPB 200 milliGRAM(s) IV Intermittent every 24 hours  furosemide    Tablet 20 milliGRAM(s) Oral daily  insulin regular Infusion 3 Unit(s)/Hr (3 mL/Hr) IV Continuous <Continuous>  meropenem  IVPB 1000 milliGRAM(s) IV Intermittent every 8 hours  metoprolol tartrate 25 milliGRAM(s) Oral every 8 hours  mexiletine 200 milliGRAM(s) Oral every 8 hours  montelukast 10 milliGRAM(s) Oral at bedtime  multivitamin 1 Tablet(s) Oral daily  nystatin    Suspension 620228 Unit(s) Oral every 6 hours  pantoprazole  Injectable 40 milliGRAM(s) IV Push daily  predniSONE   Tablet 8 milliGRAM(s) Oral daily  QUEtiapine 25 milliGRAM(s) Oral at bedtime  spironolactone 25 milliGRAM(s) Oral every 12 hours  vancomycin  IVPB 1000 milliGRAM(s) IV Intermittent every 12 hours    MEDICATIONS  (PRN):  acetaminophen    Suspension .. 675 milliGRAM(s) Oral every 6 hours PRN Moderate Pain (4 - 6)      LABS:                        10.4   17.26 )-----------( 374      ( 19 Sep 2022 00:29 )             34.2     09-19    138  |  104  |  21  ----------------------------<  99  4.6   |  27  |  0.75    Ca    8.7      19 Sep 2022 00:29  Phos  2.9     09-19  Mg     2.6     09-19    TPro  6.5  /  Alb  3.3  /  TBili  0.3  /  DBili  x   /  AST  32  /  ALT  89<H>  /  AlkPhos  101  09-19        Arterial Blood Gas:  09-19 @ 00:10  7.45/43/80/30/95.7/5.3  ABG lactate: --  Arterial Blood Gas:  09-18 @ 06:15  7.48/38/85/28/97.5/4.5  ABG lactate: --  Arterial Blood Gas:  09-18 @ 01:36  7.51/35/140/28/98.1/4.8  ABG lactate: --  Arterial Blood Gas:  09-18 @ 00:30  7.48/38/66/28/94.2/4.5  ABG lactate: --        MICROBIOLOGY:     RADIOLOGY:  [ ] Reviewed and interpreted by me    Point of Care Ultrasound Findings:    PFT:    EKG:

## 2022-09-19 NOTE — PROGRESS NOTE ADULT - SUBJECTIVE AND OBJECTIVE BOX
Patient seen and examined at the bedside.    Remained critically ill on continuous ICU monitoring.    OBJECTIVE:  Vital Signs Last 24 Hrs  T(C): 36.2 (19 Sep 2022 00:00), Max: 36.9 (18 Sep 2022 20:00)  T(F): 97.2 (19 Sep 2022 00:00), Max: 98.4 (18 Sep 2022 20:00)  HR: 88 (19 Sep 2022 06:00) (76 - 98)  BP: --  BP(mean): --  RR: 20 (19 Sep 2022 06:00) (17 - 33)  SpO2: 100% (19 Sep 2022 06:00) (93% - 100%)    Parameters below as of 19 Sep 2022 00:00  Patient On (Oxygen Delivery Method): room air          Physical Exam:   General: obese, elderly female, OOBTC, NAD  Neurology: arousable, following commands   Eyes: right pupil reactive to light, left surgical pupil  ENT/Neck: Neck supple, trachea midline, No JVD  Respiratory: Clear bilaterally   CV: S1S2, no murmurs        [x] Sternal dressing        [x] NSR  Abdominal: Soft, NT, ND, colostomy in place  Extremities: trace pedal edema noted, + peripheral pulses   Skin: Dry dressing over right heel, no Rashes, Hematoma, Ecchymosis                                                     Assessment:  71F PMH DM, COPD, Chronic Adrenal Insufficiency on Chronic prednisone, history of colorectal cancer s/p resection (colostomy bag), Hx of CAD, Chronic A fib on Eliquis, and tracheomalacia and multiple intubations, recent dx of OM presented to ED at [facility] this morning with epigastric pain, belching and central chest pain. Found on CTA to have type A aortic dissection and transferred to [facility] for surgical evaluation by Dr. Cabrera.     Ascending aortic dissection s/p modified bentall and hemiarch on 9/6   Hypovolemic shock   Post op respiratory insufficiency  Acute blood loss anemia  Stress hyperglycemia   RIJ thrombus  Pancreatic cyst    Proteus PNA  Leukocytosis          Plan:   ***Neuro***  [x] Nonfocal   Post operative neuro assessment   Continue Seroquel, improvement in mental status      ***Cardiovascular***  CT Chest on 9/12:  Status post recent ascending aortic dissection repair. Small amount of fluid surrounding the ascending aorta without evidence of enhancing wall to suggest abscess.  RUE duplex on 9/12: There is a thrombosed and occluded RIJ   Invasive hemodynamic monitoring, assess perfusion indices   SR / MAP 73/Hct 34.2/ Lactate 1..1  Blood pressure management with Lopressor   Mexiletine for hx of afib   [x] AC therapy with Eliquis     Serial EKG and cardiac enzymes   Lisinopril dc'ed due to lower BP      ***Pulmonary***  CT Chest on 9/12: Bilateral lower lobe atelectasis with bilateral pleural effusions   Reintubated for change in mental status on 9/8, self extubated on 9/9 and reintubated again, extubated to HFO2 on 9/13, now sating on room air in 90s  Encourage incentive spirometry, continue pulse ox monitoring, follow ABGs   Hx of COPD / Continue bronchodilators and Prednisone   Monitor secretions and suction PRN                   ***GI***  CT A/P on 9/12: Mild thickening of the cecum and ascending colon possibly representing mild nonspecific proximal colitis. Hepatic cirrhosis. The focal IPMN of the pancreas with large cyst within the tail the pancreas measuring 3.1 cm.  Colostomy bag in place, continue to monitor output   [x] Diet: TFs Glucerna at goal 60 cc/hr  [x] Protonix   --> FEES maybe Tuesday this week. keep NPO w/ TF till then       ***Renal***  Continue to monitor I/Os, BUN/Creatinine.   Replete lytes PRN  Amezcua present  Continue diuresis with Lasix and Aldactone      ***ID***  SCx on 9/8 +Proteus mirabilis   UA on 9/9+ LE, WBC 60, few yeast  /  UCx on 9/10 NG   Vancomycin for Empiric Dosing  BCx on 9/10 NGTD, BCx on 9/12 NGTD 9/16 bcx NGTD  Nystatin for thrush   Diflucan added yesterday for Yeast found in Urine on 9/16  Continue Meropenem seven day trial and IV Vanco seven day trial for sepsis       ***Endocrine***  [x]  DM : HbA1c 9.4%                - [x] Insulin gtt              - Need tight glycemic control to prevent wound infection.        Patient requires continuous monitoring with bedside rhythm monitoring, pulse oximetry monitoring, and continuous central venous and arterial pressure monitoring; and intermittent blood gas analysis. Care plan discussed with the ICU care team.   Patient remained critical, at risk for life threatening decompensation.    I have spent 30 minutes providing critical care management to this patient.    By signing my name below, I, Kieran Plascencia, attest that this documentation has been prepared under the direction and in the presence of Edson Chris NP   Electronically signed: Georgi Cordero, 09-19-22 @ 07:19    I, Edson Chris NP, personally performed the services described in this documentation. all medical record entries made by the scribe were at my direction and in my presence. I have reviewed the chart and agree that the record reflects my personal performance and is accurate and complete  Electronically signed: Edson Chris NP    Patient seen and examined at the bedside.    Remained critically ill on continuous ICU monitoring.    OBJECTIVE:  Vital Signs Last 24 Hrs  T(C): 36.2 (19 Sep 2022 00:00), Max: 36.9 (18 Sep 2022 20:00)  T(F): 97.2 (19 Sep 2022 00:00), Max: 98.4 (18 Sep 2022 20:00)  HR: 88 (19 Sep 2022 06:00) (76 - 98)  RR: 20 (19 Sep 2022 06:00) (17 - 33)  SpO2: 100% (19 Sep 2022 06:00) (93% - 100%)    Parameters below as of 19 Sep 2022 00:00  Patient On (Oxygen Delivery Method): room air          Physical Exam:   General: obese, elderly female, OOBTC, NAD  Neurology: arousable, following commands   Eyes: right pupil reactive to light, left surgical pupil  ENT/Neck: Neck supple, trachea midline, No JVD  Respiratory: Clear bilaterally   CV: S1S2, no murmurs        [x] Sternal dressing        [x] NSR  Abdominal: Soft, NT, ND, colostomy in place  Extremities: trace pedal edema noted, + peripheral pulses   Skin: Dry dressing over right heel, no Rashes, Hematoma, Ecchymosis                                                     Assessment:  71F PMH DM, COPD, Chronic Adrenal Insufficiency on Chronic prednisone, history of colorectal cancer s/p resection (colostomy bag), Hx of CAD, Chronic A fib on Eliquis, and tracheomalacia and multiple intubations, recent dx of OM presented to ED at [facility] this morning with epigastric pain, belching and central chest pain. Found on CTA to have type A aortic dissection and transferred to [facility] for surgical evaluation by Dr. Cabrera.     Ascending aortic dissection s/p modified bentall and hemiarch on 9/6   Hypovolemic shock   Post op respiratory insufficiency  Acute blood loss anemia  Stress hyperglycemia   RIJ thrombus  Pancreatic cyst    Proteus PNA  Leukocytosis          Plan:   ***Neuro***  [x] Nonfocal   Post operative neuro assessment   Continue Seroquel, improvement in mental status      ***Cardiovascular***  CT Chest on 9/12:  Status post recent ascending aortic dissection repair. Small amount of fluid surrounding the ascending aorta without evidence of enhancing wall to suggest abscess.  RUE duplex on 9/12: There is a thrombosed and occluded RIJ   Invasive hemodynamic monitoring, assess perfusion indices   SR / MAP 73/Hct 34.2/ Lactate 1..1  Continue lopressor for afib prophylaxis and rate control  Continue Mexiletine    [x] AC therapy with Eliquis     Lisinopril dc'ed due to hypotension      ***Pulmonary***  CT Chest on 9/12: Bilateral lower lobe atelectasis with bilateral pleural effusions   Reintubated for change in mental status on 9/8, self extubated on 9/9 and reintubated again, extubated to HFO2 on 9/13, now sating on room air in 90s  Encourage incentive spirometry, continue pulse ox monitoring, follow ABGs   Hx of COPD / Continue bronchodilators and Prednisone   Monitor secretions and suction PRN                   ***GI***  CT A/P on 9/12: Mild thickening of the cecum and ascending colon possibly representing mild nonspecific proximal colitis. Hepatic cirrhosis. The focal IPMN of the pancreas with large cyst within the tail the pancreas measuring 3.1 cm.  Colostomy bag in place, continue to monitor output   [x] Diet: TFs Glucerna at goal 60 cc/hr  [x] Protonix   --> FEES maybe Tuesday this week. keep NPO w/ TF till then       ***Renal***  Continue to monitor I/Os, BUN/Creatinine.   Replete lytes PRN  Amezcua present  Continue diuresis with Lasix and Aldactone      ***ID***  SCx on 9/8 +Proteus mirabilis   UA on 9/9+ LE, WBC 60, few yeast  /  UCx on 9/10 NG   BCx on 9/10 NGTD, BCx on 9/12 NGTD 9/16 bcx NGTD  Nystatin for thrush   Diflucan added yesterday for Yeast found in Urine on 9/16  Antibiotics discontinued due to lack of source per ID rec. Leukocytosis improving. Afebrile. Will monitor off antibiotics.        ***Endocrine***  [x]  DM : HbA1c 9.4%                - [x] Insulin gtt discontinued. Transitioned to corrective scale and NPH q 6 hrs              - Need tight glycemic control to prevent wound infection.        Patient requires continuous monitoring with bedside rhythm monitoring, pulse oximetry monitoring, and continuous central venous and arterial pressure monitoring; and intermittent blood gas analysis. Care plan discussed with the ICU care team.   Patient remained critical, at risk for life threatening decompensation.    I have spent 30 minutes providing critical care management to this patient.    By signing my name below, I, Kieran Plascencia, attest that this documentation has been prepared under the direction and in the presence of Edson Chris NP   Electronically signed: Georgi Cordero, 09-19-22 @ 07:19    I, Edson Chris NP, personally performed the services described in this documentation. all medical record entries made by the scribe were at my direction and in my presence. I have reviewed the chart and agree that the record reflects my personal performance and is accurate and complete  Electronically signed: Edson Chris NP

## 2022-09-19 NOTE — PROGRESS NOTE ADULT - SUBJECTIVE AND OBJECTIVE BOX
Patient is a 71y old  Female who presents with a chief complaint of Type A aortic dissection (19 Sep 2022 15:18)       INTERVAL HPI/OVERNIGHT EVENTS:  Patient seen and evaluated at bedside.  Pt is resting comfortable in NAD.     Allergies    aspirin (Short breath)  Avelox (Short breath; Pruritus)  cefepime (Anaphylaxis)  codeine (Short breath)  Dilaudid (Short breath)  iodine (Short breath; Swelling)  penicillin (Anaphylaxis)  shellfish (Anaphylaxis)  tetanus toxoid (Short breath)  Valium (Short breath)    Intolerances        Vital Signs Last 24 Hrs  T(C): 36.7 (19 Sep 2022 12:00), Max: 36.9 (18 Sep 2022 20:00)  T(F): 98.1 (19 Sep 2022 12:00), Max: 98.4 (18 Sep 2022 20:00)  HR: 79 (19 Sep 2022 18:00) (76 - 110)  BP: --  BP(mean): --  RR: 28 (19 Sep 2022 18:00) (16 - 29)  SpO2: 94% (19 Sep 2022 18:00) (93% - 100%)    Parameters below as of 19 Sep 2022 17:46  Patient On (Oxygen Delivery Method): room air        LABS:                        10.4   17.26 )-----------( 374      ( 19 Sep 2022 00:29 )             34.2     09-19    138  |  104  |  21  ----------------------------<  99  4.6   |  27  |  0.75    Ca    8.7      19 Sep 2022 00:29  Phos  2.9     09-19  Mg     2.6     09-19    TPro  6.5  /  Alb  3.3  /  TBili  0.3  /  DBili  x   /  AST  32  /  ALT  89<H>  /  AlkPhos  101  09-19        CAPILLARY BLOOD GLUCOSE  302 (19 Sep 2022 18:00)  120 (19 Sep 2022 12:00)  116 (19 Sep 2022 11:00)  197 (19 Sep 2022 10:00)  175 (19 Sep 2022 09:00)  167 (19 Sep 2022 08:00)  220 (19 Sep 2022 03:00)  127 (19 Sep 2022 01:00)  107 (19 Sep 2022 00:30)  99 (19 Sep 2022 00:00)      POCT Blood Glucose.: 302 mg/dL (19 Sep 2022 17:34)  POCT Blood Glucose.: 120 mg/dL (19 Sep 2022 12:24)  POCT Blood Glucose.: 116 mg/dL (19 Sep 2022 10:57)  POCT Blood Glucose.: 195 mg/dL (19 Sep 2022 10:07)  POCT Blood Glucose.: 176 mg/dL (19 Sep 2022 09:08)  POCT Blood Glucose.: 167 mg/dL (19 Sep 2022 08:06)  POCT Blood Glucose.: 148 mg/dL (19 Sep 2022 06:55)  POCT Blood Glucose.: 193 mg/dL (19 Sep 2022 05:58)  POCT Blood Glucose.: 187 mg/dL (19 Sep 2022 05:28)  POCT Blood Glucose.: 230 mg/dL (19 Sep 2022 03:58)  POCT Blood Glucose.: 220 mg/dL (19 Sep 2022 02:56)  POCT Blood Glucose.: 127 mg/dL (19 Sep 2022 01:01)  POCT Blood Glucose.: 107 mg/dL (19 Sep 2022 00:35)  POCT Blood Glucose.: 99 mg/dL (19 Sep 2022 00:07)  POCT Blood Glucose.: 115 mg/dL (18 Sep 2022 23:02)  POCT Blood Glucose.: 152 mg/dL (18 Sep 2022 22:20)  POCT Blood Glucose.: 178 mg/dL (18 Sep 2022 21:11)  POCT Blood Glucose.: 138 mg/dL (18 Sep 2022 19:59)  POCT Blood Glucose.: 154 mg/dL (18 Sep 2022 18:52)      Lower Extremity Physical Exam:  Vascular: DP/PT 2/4 B/L, CFT <3 seconds B/L, Temperature gradient warm to cool B/L  Neuro: Epicritic sensation intact to the level of midfoot B/L  Musculoskeletal/Ortho: unremarkable   Skin: right foot with two navicular wounds with fibrous wound bed, no clinical signs of infection. L foot with no clinical signs of infection.

## 2022-09-19 NOTE — PROGRESS NOTE ADULT - PROBLEM SELECTOR PLAN 1
- FS q1h while on insulin gtt   - FS q6h  once on SQ insulin  - Start NPH 20 units q6h  based on last 12 hours insulin drip requirements. Will adjust as needed  -DO NOT restart insulin drip for BG >180s but increase NPH dose instead. Can contact endo team for adjustments if needed  - Stop insulin drip 2 hours after 1st dose of NPH  is given  - Hold NPH if TFs are off/held  - Please contact endo team with any changes on TF/PO/Steroid status  Discharge plan:  - Likely to discharge patient home on basal/bolus insulin. Final regimen pending clinical course.  - Recommend routine outpatient ophthalmology, podiatry  - Can f/u with endocrinologist Dr. Saavedra.  - Make sure pt has Rx for all DM supplies and insulin/ DM meds.  -Based on pt's clinical condition and mental status at this time she is not able to independently manage her DM. Will evaluate pt's ability to manage DM at time of discharge. If unable> pt will need family/ care giver (s) to manage DM care at home  -Might go to rehab.

## 2022-09-19 NOTE — PROGRESS NOTE ADULT - NUTRITIONAL ASSESSMENT
Diet, NPO with Tube Feed:   Tube Feeding Modality: Nasogastric  Glucerna 1.5 Chago (GLUCERNA1.5RTH)  Total Volume for 24 Hours (mL): 1440  Continuous  Starting Tube Feed Rate {mL per Hour}: 20  Increase Tube Feed Rate by (mL): 20     Every hour  Until Goal Tube Feed Rate (mL per Hour): 60  Tube Feed Duration (in Hours): 24  Tube Feed Start Time: 12:00 (09-16-22 @ 11:36) [Active]      Please see RD assessment and/or follow up.  Managed by primary team as well

## 2022-09-19 NOTE — PROGRESS NOTE ADULT - ASSESSMENT
71 year-old female with a history of DM2, CAD, A-Fib on apixaban, Severe Persistent Asthma (on chronic steroids, recently started on Tezspire), colon cancer s/p resection/chemo, and tracheobronchomalacia s/p tracheoplasty, and recent OM of the R foot s/b debridement and completed course of Vancomycin/Ertapenem for MRSA/ESBL Proteus/Corynebacterium who now presents with chest pain, found to have Type A dissection s/p repair with modified Bentall procedure and hemiarch replacement on 9/6/22. Post-op course complicated by acute hypoxemic respiratory failure, shock, anemia, and hyperglycemia requiring insulin gtt. Extubated 9/13, now awake and alert with mild encephalopathy but overall significantly improved.    Assessment:  Acute hypoxemic respiratory failure requiring intubation  Type A Aortic Dissection  Severe Persistent Asthma  History of Tracheobronchomalacia    Plan:  - Currently doing well on NC O2-2 liters NC  - If respiratory status remains stable, goal SpO2>92%  - Continue prednisone at 8 mg daily (chronic dose for her severe persistent asthma)  - Albuterol and ipratropium nebs q6h    - Budesonide 0.5 mg nebs q12h    - Chest PT, incentive spirometry, out of bed to chair  - S/p Tezspire injection on 8/29  - montelukast 10 mg at bedtime (takes Zafirlukast as outpatient)  - Remains on heparin gtt for A-Fib + RIJ thrombus  - R arm elevation and warm compresses for superficial thrombophlebitis with edema  - Remains on mexiletine and metoprolol for A-Fib  - Currently appears euvolemic, on furosemide 40 mg oral daily, strict I/O's, close monitoring of kidney function and lytes  - On meropenem for Proteus mirabilis pneumonia + ESBL Proteus mirabilis infection of right foot, f/up ID and podiatry  - Discussed with patient and daughter Zoe at bedside, full code  **************************                                SEE Below:  9/14-improving but weakness  9/15-ABX adjusted to ceftriaxone (LFTS)-PICU  9/16-PICU, low grade temp-fever work up in progress, no resp decline or sx  9/19-resp stable at present but tenuous

## 2022-09-19 NOTE — PROGRESS NOTE ADULT - ASSESSMENT
71F s/p right foot navicular bone biopsy, wound debridement and heel I&D with medial navicular wound   - pt seen and evaluated  - Right foot with two navicular wounds with fibrous wound bed, no clinical signs of infection. L foot with no clinical signs of infection.   - No clinical signs of infection to b/l feet   - Continue Z flows at all times   - Continue santyl for R foot wound followed by DSD  - Completed Vanco and Ertapenem via mediport   - Pt is stable from podiatry for d/c, f/u and wound care instructions listed in discharge note provider

## 2022-09-19 NOTE — PROGRESS NOTE ADULT - ASSESSMENT
Impression:    gluteal cleft Moisture dermatitis    Recommend:  1.) topical therapy: sacral/buttock injury - cleanse with incontinence cleanser, pat dry, apply Triad ointment BID and PRN for incontinent episodes  2.) Incontinence Management - incontinence cleanser, pads, pericare BID  3.) Maintain on an alternating air with low air loss surface  4.) Turn and reposition Q 2 hours  5.) Nutrition optimization  6.) Offload heels/feet with complete cair air fluidized boots or pillows; ensure that the soles of the feet are not resting on the foot board of the bed.    Care as per medicine. Will not actively follow but will remain available. Please recall for new issues or deterioration.  Upon discharge f/u as outpatient at [facility] [address] [facility] [phone]  Thank you for this consult  Seen and discussed with clincal nurse  Olamide Lewis, ALLEY-C, CWOCN [id number]

## 2022-09-19 NOTE — PROGRESS NOTE ADULT - SUBJECTIVE AND OBJECTIVE BOX
Follow Up:  fever    Interval History/ROS: pt is doing well with no complains and has been afebrile    Allergies  aspirin (Short breath)  Avelox (Short breath; Pruritus)  cefepime (Anaphylaxis)  codeine (Short breath)  Dilaudid (Short breath)  iodine (Short breath; Swelling)  penicillin (Anaphylaxis)  shellfish (Anaphylaxis)  tetanus toxoid (Short breath)  Valium (Short breath)        ANTIMICROBIALS:  nystatin    Suspension [id number] every 6 hours      OTHER MEDS:  acetaminophen    Suspension .. 675 milliGRAM(s) Oral every 6 hours PRN  albuterol/ipratropium for Nebulization 3 milliLiter(s) Nebulizer every 6 hours  albuterol/ipratropium for Nebulization 3 milliLiter(s) Nebulizer every 12 hours PRN  apixaban 5 milliGRAM(s) Oral every 12 hours  buDESOnide    Inhalation Suspension 0.5 milliGRAM(s) Inhalation every 12 hours  chlorhexidine 2% Cloths 1 Application(s) Topical <User Schedule>  collagenase Ointment 1 Application(s) Topical daily  furosemide    Tablet 20 milliGRAM(s) Oral daily  insulin NPH human recombinant 20 Unit(s) SubCutaneous every 6 hours  insulin regular Infusion 3 Unit(s)/Hr IV Continuous <Continuous>  metoprolol tartrate 25 milliGRAM(s) Oral every 8 hours  mexiletine 200 milliGRAM(s) Oral every 8 hours  montelukast 10 milliGRAM(s) Oral at bedtime  multivitamin 1 Tablet(s) Oral daily  pantoprazole  Injectable 40 milliGRAM(s) IV Push daily  predniSONE   Tablet 8 milliGRAM(s) Oral daily  QUEtiapine 25 milliGRAM(s) Oral at bedtime  spironolactone 25 milliGRAM(s) Oral every 12 hours      Vital Signs Last 24 Hrs  T(C): 36.7 (19 Sep 2022 12:00), Max: 36.9 (18 Sep 2022 20:00)  T(F): 98.1 (19 Sep 2022 12:00), Max: 98.4 (18 Sep 2022 20:00)  HR: 97 (19 Sep 2022 13:00) (76 - 97)  BP: --  BP(mean): --  RR: 19 (19 Sep 2022 13:00) (16 - 29)  SpO2: 100% (19 Sep 2022 13:00) (93% - 100%)    Parameters below as of 19 Sep 2022 09:58  Patient On (Oxygen Delivery Method): room air        Physical Exam:  General:  NAD, non toxic  Respiratory:   clear b/l,    no wheezing  abd:     soft,   BS +,   no tenderness, ostomy with liquid brown stool  :   no CVAT,  no suprapubic tenderness,   no  ramirez  Musculoskeletal:   no joint swelling  vascular: R chest port with no tenderness or erythema  Skin:    no rash                          10.4   17.26 )-----------( 374      ( 19 Sep 2022 00:29 )             34.2       09-19    138  |  104  |  21  ----------------------------<  99  4.6   |  27  |  0.75    Ca    8.7      19 Sep 2022 00:29  Phos  2.9     09-19  Mg     2.6     09-19    TPro  6.5  /  Alb  3.3  /  TBili  0.3  /  DBili  x   /  AST  32  /  ALT  89<H>  /  AlkPhos  101  09-19          MICROBIOLOGY:  Vancomycin Level, Trough: 15.0 ug/mL (09-19-22 @ 03:37)  Vancomycin Level, Trough: 11.5 ug/mL (09-18-22 @ 16:30)  v  Catheterized Catheterized  09-16-22   50,000 - 99,000 CFU/mL Candida albicans "Susceptibilities not performed"  --  --      .Blood Blood-Peripheral  09-16-22   No growth to date.  --  --      .Blood Blood  09-12-22   No Growth Final  --  --      .Blood Blood  09-10-22   No Growth Final  --  --      Catheterized Catheterized  09-10-22   <10,000 CFU/mL Normal Urogenital Jessica  --  --      .Sputum Sputum  09-08-22   Numerous Proteus mirabilis  Unable to evaluate further due to Proteus overgrowth  --  Proteus mirabilis                RADIOLOGY:  Images independently visualized and reviewed personally, findings as below  < from: Xray Chest 1 View- PORTABLE-Routine (Xray Chest 1 View- PORTABLE-Routine in AM.) (09.19.22 @ 02:19) >  FINDINGS:  There is an enteric tube, coursing below the diaphragm, with its tip out   of the field of view.  Implantable port overlying the right chest wall with its tip terminating   in the cavoatrial junction.  Median sternotomy wires and aortic valve prosthesis are noted.  The heart size cannot be accurately assessed in this projection.  The lungs are clear.  Small left pleural effusion and trace right pleural effusion.  There is no pneumothorax.  No acute bony abnormality.    IMPRESSION:  Lines and tubes, as above.  Bilateral pleural effusions, left greater than right.    < end of copied text >  < from: CT Abdomen and Pelvis w/ IV Cont (09.12.22 @ 16:31) >  IMPRESSION:  1.  Status post recent ascending aortic dissection repair. Small amount   of fluid surrounding the ascending aorta without evidence of enhancing   wall to suggest abscess.  2.  Bilateral lower lobe atelectasis with bilateral pleural effusions.  3.  Mild thickening of the cecum and ascending colon possibly   representing mild nonspecific proximal colitis.  4.  Hepatic cirrhosis.  5.  The focal IPMN of the pancreas with large cyst within the tail the   pancreas measuring 3.1 cm. Follow-up recommended.    < end of copied text >  < from: Intra-Operative Transesophageal Echo (09.06.22 @ 22:54) >  Conclusions:  1. Normal mitral valve. Mild mitral regurgitation.  2. Calcified trileaflet aortic valve with decreased  opening. Moderate aortic regurgitation.  3. Aortic Annulus: 1.9 cm.  Aortic Root: 3.9 cm.  Sinotubular Junction: 4 cm.  LVOT diameter: 1.9 cm.  A dissection flap was noted in the proximal ascending aorta  extending into arch and decscending thoracic and abdominal  aorta. A clear end of dissection was by SAFIA in transgastric  views.  SAFIA guidaince for aortic cannulation of true lumen  provided.  4. Moderate concentric left ventricular hypertrophy.  5. Normal left ventricular systolic function. No segmental  wall motion abnormalities.  6. Normal right atrium.  7. Normal right ventricular size and function.  8. Normal tricuspid valve. Mild tricuspid regurgitation.  9. Moderate pericardial effusion with no tamponade noted  pre-bypass    < end of copied text >

## 2022-09-19 NOTE — PROGRESS NOTE ADULT - ASSESSMENT
71 f with DM, CAD, a-fib, asthma/COPD, Chronic Adrenal Insufficiency on steroids, history of colorectal cancer s/p resection and ostomy, tracheomalacia and multiple intubations, recent admission for sepsis, foot osteo and abscess s/p debridement and OR cx with MRSA, ESBL proteus and corynebacterium s/p 6 weeks of vanco and ertapenem which ended 8/17, now p/w chest pain, found to have  type A aortic dissection, s/p modified Bentall and hemiarch on 9/6/22.   Post op course complicated by shock, respiratory failure, anemia and hyperglycemia.   fever started 9/9, sputum cx with proteus mirabilis    fever post op for aortic dissection, sputum cx with proteus, pt was still febrile on zosyn but last wound cx that showed proteus was ESBL, switched to suraj and still persistently febrile and the proteus now is pan sensitive, chest /abd CT 9/12 with small fluid around the ascending aorta but no enhancing or abscess, b/l lower lobe atelectasis, ?mild colitis  doppler: thrombosed and occluded RIJ  adrenal insufficiency, was on prednisone, was given dexa 9/12 and 9/13 and no more fevers until 16th, extubated 9/13 so the persistent fevers could be in the setting of adrenal insufficiency as imaging and cx negative, pt already extubated and no focal symptoms  blood and urine cx negative, ALT, AST increased today to 200s, no bili or ALK, unlikely due to suraj  penicillin and cefepime allergy in chart but pt has received cefepime and ceftriaxone before    * was on cefepime 9/9, switched to suraj 9/10, and then ceftriaxone on 6/14 to complete the course but pt became febrile  although clinically well, non toxic, vanco, suraj was resumed by CT9/16, blood cx negative and urine cx with candida albicans, discontinue vanco and suraj  * I don't think candida albicans in the urine is responsible for patient's fevers and already had 4 days of fluconazole, discontinue  * monitor WBC/diff and renal function      The above assessment and plan was discussed with CTU    Michelle Rolon MD  contact on teams  After 5pm and on weekends call [phone]

## 2022-09-19 NOTE — PROGRESS NOTE ADULT - TIME BILLING
as above: mucusy-tenuous status, remains in PICU  multifactorial dyspnea-resp failure--severe persistent asthma, TBM s/p tracheoplasty, s/p Aortic aneurysm repair, Bronchitis (proteus)-O2 NC-keep 90%  severe persistent asthma--medrol 8mg, singulair 10, duoneb q 6, budes .5 bid, incentive spirometry, tezspire 8/29-next 9/29  TBM-s/p tracheoplasty--accapella, unable to use vest due to surgery  s/p Aortic aneurysm repair--as per [facility] staff care  AF-on heparin--eventual eliquis rx  DVT R-IJ-on heparin rx  ID-proteus bronchitisi-meropenum changed to ceftriaxone and back to meropenem/vanco as per ID-? duration as new temp was noted on 9/16  PT-OOB as able         VC dysfunction--aspiration precautions-sp and sw evaln--NGT in place  GOC                                    Heme onc f/up colon ca  prog--guarded in CTU/PICU                PT--OOB as much as possible and all neb rx is to be HHN and not FM (d/w nursing)    Eulalio Allison MD-Pulmonary   [phone]

## 2022-09-19 NOTE — PROGRESS NOTE ADULT - SUBJECTIVE AND OBJECTIVE BOX
Wound Surgery Consult Note:    HPI:  71F PMH DM, COPD, Chronic Adrenal Insufficiency on Chronic prednisone, history of colorectal cancer s/p resection (colostomy bag), Hx of CAD, Chronic A fib on Eliquis, and tracheomalacia and multiple intubations, recent dx of OM presented to ED at [facility] this morning with epigastric pain, belching and central chest pain. Found on CTA to have type A aortic dissection and transferred to [facility] for surgical evaluation by Dr. Cabrera. (06 Sep 2022 16:32)    Request for wound care consult for sacral/bilateral buttocks skin breakdown received from nursing. Ms. Bloom was encountered on an alternating air with low air loss surface. She stated that she had a "bedsore" on her buttocks in the past but that it healed. Her extreme immobility, inactivity, incontinence of urine as well as poor nutritional status all contribute to her high risk for pressure injury development and hinder healing.     PAST MEDICAL & SURGICAL HISTORY:  Atrial fibrillation, paroxysmal, on eliquis  Diabetes, Type 2  COPD (chronic obstructive pulmonary disease)  Adrenal insufficiency, Medrol daily for over 50 years  Aortic insufficiency, moderate AR on echo 5/3/2018  Pelvic fracture  Asthma  Tracheobronchomalacia  diagnosed 2015, s/p bronchial thermoplasty 2016 (Dr Zapien); recent bronchoscopy 6/5/2018 revealed no evidence of tracheobronchomalacia in trachea or bronchial tubes  Colorectal cancer, 4/2018- last treatment , chemo and radiation  Rectal bleeding  Seizure, x 1 1/7/18  DVT (deep venous thrombosis), 15-20 years ago, took coumadin  TIA (transient ischemic attack), multiple, last 5 years ago - presents as right-sided weakness  History of partial hysterectomy, 30 years ago - fibroids  H/O total knee replacement, bilateral, 5 years ago  History of sinus surgery, multiple sinus surgeries  Exostosis of orbit, left, 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  H/O pelvic surgery, 5 years ago - s/p fracture  History of tracheomalacia  2015 - attempted tracheal stenting ([facility])- course complicated by obstruction, respiratory failure, multiple CPR attempts -  stent discontinued; 10/20/2016 Tracheobronchoplasty (Prolene Mesh) performed at [facility] by Dr Zapien  S/P bronchoscopy, 6/5/2018 - [facility] (Dr Zapien) no evidence of tracheobronchomalacia in trachea or bronchial tubes  Rectal bleeding, exam under anesthesia ([facility]) 2/2018    MEDICATIONS  (STANDING):  albuterol/ipratropium for Nebulization 3 milliLiter(s) Nebulizer every 6 hours  apixaban 5 milliGRAM(s) Oral every 12 hours  buDESOnide    Inhalation Suspension 0.5 milliGRAM(s) Inhalation every 12 hours  chlorhexidine 2% Cloths 1 Application(s) Topical <User Schedule>  collagenase Ointment 1 Application(s) Topical daily  furosemide    Tablet 20 milliGRAM(s) Oral daily  insulin NPH human recombinant 20 Unit(s) SubCutaneous every 6 hours  insulin regular Infusion 3 Unit(s)/Hr (3 mL/Hr) IV Continuous <Continuous>  metoprolol tartrate 25 milliGRAM(s) Oral every 8 hours  mexiletine 200 milliGRAM(s) Oral every 8 hours  montelukast 10 milliGRAM(s) Oral at bedtime  multivitamin 1 Tablet(s) Oral daily  nystatin    Suspension 721965 Unit(s) Oral every 6 hours  pantoprazole  Injectable 40 milliGRAM(s) IV Push daily  predniSONE   Tablet 8 milliGRAM(s) Oral daily  QUEtiapine 25 milliGRAM(s) Oral at bedtime  spironolactone 25 milliGRAM(s) Oral every 12 hours    MEDICATIONS  (PRN):  acetaminophen    Suspension .. 675 milliGRAM(s) Oral every 6 hours PRN Moderate Pain (4 - 6)  albuterol/ipratropium for Nebulization 3 milliLiter(s) Nebulizer every 12 hours PRN Shortness of Breath and/or Wheezing    Allergies    aspirin (Short breath)  Avelox (Short breath; Pruritus)  cefepime (Anaphylaxis)  codeine (Short breath)  Dilaudid (Short breath)  iodine (Short breath; Swelling)  penicillin (Anaphylaxis)  shellfish (Anaphylaxis)  tetanus toxoid (Short breath)  Valium (Short breath)    Intolerances    Vital Signs Last 24 Hrs  T(C): 36.7 (19 Sep 2022 12:00), Max: 36.9 (18 Sep 2022 20:00)  T(F): 98.1 (19 Sep 2022 12:00), Max: 98.4 (18 Sep 2022 20:00)  HR: 97 (19 Sep 2022 15:00) (76 - 110)  BP: --  BP(mean): --  RR: 16 (19 Sep 2022 15:00) (16 - 29)  SpO2: 98% (19 Sep 2022 15:00) (93% - 100%)    Parameters below as of 19 Sep 2022 09:58  Patient On (Oxygen Delivery Method): room air    Physical Exam:  General: Alert, obese  Respiratory: equal chest rise with respirations  Gastrointestinal: soft NT/ND  Neurology: verbal, following commands  Musculoskeletal: no contractures  Vascular: BLE edema equal  Skin:  Sacral/bilateral buttocks with intact skin with small area of hypopigmentation, no drainage  No odor, erythema, increased warmth, tenderness, induration, fluctuance    LABS:  09-19    138  |  104  |  21  ----------------------------<  99  4.6   |  27  |  0.75    Ca    8.7      19 Sep 2022 00:29  Phos  2.9     09-19  Mg     2.6     09-19    TPro  6.5  /  Alb  3.3  /  TBili  0.3  /  DBili  x   /  AST  32  /  ALT  89<H>  /  AlkPhos  101  09-19                          10.4   17.26 )-----------( 374      ( 19 Sep 2022 00:29 )             34.2

## 2022-09-19 NOTE — PROGRESS NOTE ADULT - NSPROGADDITIONALINFOA_GEN_ALL_CORE
-Plan discussed with pt/team.  Contact info: [phone] (24/7). pager [phone]  [email or web address] password [email or web address]  Spent  28 minutes assessing pt/labs/meds and discussing plan of care with primary team  Adjusting insulin  Discharge plan  Follow up care

## 2022-09-20 LAB
ALBUMIN SERPL ELPH-MCNC: 3.6 G/DL — SIGNIFICANT CHANGE UP (ref 3.3–5)
ALP SERPL-CCNC: 118 U/L — SIGNIFICANT CHANGE UP (ref 40–120)
ALT FLD-CCNC: 76 U/L — HIGH (ref 10–45)
ANION GAP SERPL CALC-SCNC: 12 MMOL/L — SIGNIFICANT CHANGE UP (ref 5–17)
AST SERPL-CCNC: 24 U/L — SIGNIFICANT CHANGE UP (ref 10–40)
BILIRUB SERPL-MCNC: 0.3 MG/DL — SIGNIFICANT CHANGE UP (ref 0.2–1.2)
BUN SERPL-MCNC: 28 MG/DL — HIGH (ref 7–23)
CALCIUM SERPL-MCNC: 9.3 MG/DL — SIGNIFICANT CHANGE UP (ref 8.4–10.5)
CHLORIDE SERPL-SCNC: 99 MMOL/L — SIGNIFICANT CHANGE UP (ref 96–108)
CO2 SERPL-SCNC: 24 MMOL/L — SIGNIFICANT CHANGE UP (ref 22–31)
CREAT SERPL-MCNC: 0.73 MG/DL — SIGNIFICANT CHANGE UP (ref 0.5–1.3)
EGFR: 87 ML/MIN/1.73M2 — SIGNIFICANT CHANGE UP
GAS PNL BLDA: SIGNIFICANT CHANGE UP
GLUCOSE BLDC GLUCOMTR-MCNC: 119 MG/DL — HIGH (ref 70–99)
GLUCOSE BLDC GLUCOMTR-MCNC: 153 MG/DL — HIGH (ref 70–99)
GLUCOSE BLDC GLUCOMTR-MCNC: 165 MG/DL — HIGH (ref 70–99)
GLUCOSE BLDC GLUCOMTR-MCNC: 201 MG/DL — HIGH (ref 70–99)
GLUCOSE BLDC GLUCOMTR-MCNC: 222 MG/DL — HIGH (ref 70–99)
GLUCOSE BLDC GLUCOMTR-MCNC: 255 MG/DL — HIGH (ref 70–99)
GLUCOSE BLDC GLUCOMTR-MCNC: 282 MG/DL — HIGH (ref 70–99)
GLUCOSE BLDC GLUCOMTR-MCNC: 288 MG/DL — HIGH (ref 70–99)
GLUCOSE BLDC GLUCOMTR-MCNC: 314 MG/DL — HIGH (ref 70–99)
GLUCOSE SERPL-MCNC: 220 MG/DL — HIGH (ref 70–99)
HCT VFR BLD CALC: 36.1 % — SIGNIFICANT CHANGE UP (ref 34.5–45)
HGB BLD-MCNC: 11.1 G/DL — LOW (ref 11.5–15.5)
MAGNESIUM SERPL-MCNC: 2.4 MG/DL — SIGNIFICANT CHANGE UP (ref 1.6–2.6)
MCHC RBC-ENTMCNC: 23.7 PG — LOW (ref 27–34)
MCHC RBC-ENTMCNC: 30.7 GM/DL — LOW (ref 32–36)
MCV RBC AUTO: 77.1 FL — LOW (ref 80–100)
NRBC # BLD: 1 /100 WBCS — HIGH (ref 0–0)
PHOSPHATE SERPL-MCNC: 3.3 MG/DL — SIGNIFICANT CHANGE UP (ref 2.5–4.5)
PLATELET # BLD AUTO: 460 K/UL — HIGH (ref 150–400)
POTASSIUM SERPL-MCNC: 5.1 MMOL/L — SIGNIFICANT CHANGE UP (ref 3.5–5.3)
POTASSIUM SERPL-SCNC: 5.1 MMOL/L — SIGNIFICANT CHANGE UP (ref 3.5–5.3)
PROT SERPL-MCNC: 7.3 G/DL — SIGNIFICANT CHANGE UP (ref 6–8.3)
RBC # BLD: 4.68 M/UL — SIGNIFICANT CHANGE UP (ref 3.8–5.2)
RBC # FLD: SIGNIFICANT CHANGE UP (ref 10.3–14.5)
SARS-COV-2 RNA SPEC QL NAA+PROBE: SIGNIFICANT CHANGE UP
SODIUM SERPL-SCNC: 135 MMOL/L — SIGNIFICANT CHANGE UP (ref 135–145)
WBC # BLD: 16.77 K/UL — HIGH (ref 3.8–10.5)
WBC # FLD AUTO: 16.77 K/UL — HIGH (ref 3.8–10.5)

## 2022-09-20 PROCEDURE — 99232 SBSQ HOSP IP/OBS MODERATE 35: CPT

## 2022-09-20 PROCEDURE — 71045 X-RAY EXAM CHEST 1 VIEW: CPT | Mod: 26

## 2022-09-20 PROCEDURE — 99232 SBSQ HOSP IP/OBS MODERATE 35: CPT | Mod: GC

## 2022-09-20 PROCEDURE — 99291 CRITICAL CARE FIRST HOUR: CPT | Mod: 24

## 2022-09-20 RX ORDER — HUMAN INSULIN 100 [IU]/ML
48 INJECTION, SUSPENSION SUBCUTANEOUS EVERY 6 HOURS
Refills: 0 | Status: DISCONTINUED | OUTPATIENT
Start: 2022-09-20 | End: 2022-09-21

## 2022-09-20 RX ORDER — INSULIN HUMAN 100 [IU]/ML
4 INJECTION, SOLUTION SUBCUTANEOUS
Qty: 100 | Refills: 0 | Status: DISCONTINUED | OUTPATIENT
Start: 2022-09-20 | End: 2022-09-21

## 2022-09-20 RX ORDER — DEXTROSE 50 % IN WATER 50 %
50 SYRINGE (ML) INTRAVENOUS
Refills: 0 | Status: DISCONTINUED | OUTPATIENT
Start: 2022-09-20 | End: 2022-09-21

## 2022-09-20 RX ORDER — HUMAN INSULIN 100 [IU]/ML
32 INJECTION, SUSPENSION SUBCUTANEOUS EVERY 6 HOURS
Refills: 0 | Status: DISCONTINUED | OUTPATIENT
Start: 2022-09-20 | End: 2022-09-20

## 2022-09-20 RX ORDER — HUMAN INSULIN 100 [IU]/ML
40 INJECTION, SUSPENSION SUBCUTANEOUS EVERY 6 HOURS
Refills: 0 | Status: DISCONTINUED | OUTPATIENT
Start: 2022-09-20 | End: 2022-09-20

## 2022-09-20 RX ADMIN — CHLORHEXIDINE GLUCONATE 1 APPLICATION(S): 213 SOLUTION TOPICAL at 05:37

## 2022-09-20 RX ADMIN — Medication 6: at 17:42

## 2022-09-20 RX ADMIN — Medication 3 MILLILITER(S): at 23:26

## 2022-09-20 RX ADMIN — Medication 8 MILLIGRAM(S): at 05:37

## 2022-09-20 RX ADMIN — MEXILETINE HYDROCHLORIDE 200 MILLIGRAM(S): 150 CAPSULE ORAL at 21:55

## 2022-09-20 RX ADMIN — Medication 25 MILLIGRAM(S): at 05:36

## 2022-09-20 RX ADMIN — Medication 500000 UNIT(S): at 17:05

## 2022-09-20 RX ADMIN — MEXILETINE HYDROCHLORIDE 200 MILLIGRAM(S): 150 CAPSULE ORAL at 13:07

## 2022-09-20 RX ADMIN — HUMAN INSULIN 28 UNIT(S): 100 INJECTION, SUSPENSION SUBCUTANEOUS at 00:26

## 2022-09-20 RX ADMIN — HUMAN INSULIN 32 UNIT(S): 100 INJECTION, SUSPENSION SUBCUTANEOUS at 05:50

## 2022-09-20 RX ADMIN — INSULIN HUMAN 4 UNIT(S)/HR: 100 INJECTION, SOLUTION SUBCUTANEOUS at 05:51

## 2022-09-20 RX ADMIN — SPIRONOLACTONE 25 MILLIGRAM(S): 25 TABLET, FILM COATED ORAL at 17:05

## 2022-09-20 RX ADMIN — Medication 3 MILLILITER(S): at 17:03

## 2022-09-20 RX ADMIN — Medication 3 MILLILITER(S): at 11:26

## 2022-09-20 RX ADMIN — Medication 3 MILLILITER(S): at 05:38

## 2022-09-20 RX ADMIN — Medication 500000 UNIT(S): at 00:24

## 2022-09-20 RX ADMIN — Medication 25 MILLIGRAM(S): at 13:07

## 2022-09-20 RX ADMIN — Medication 500000 UNIT(S): at 11:09

## 2022-09-20 RX ADMIN — MONTELUKAST 10 MILLIGRAM(S): 4 TABLET, CHEWABLE ORAL at 21:54

## 2022-09-20 RX ADMIN — PANTOPRAZOLE SODIUM 40 MILLIGRAM(S): 20 TABLET, DELAYED RELEASE ORAL at 11:09

## 2022-09-20 RX ADMIN — Medication 0.5 MILLIGRAM(S): at 17:02

## 2022-09-20 RX ADMIN — Medication 0.5 MILLIGRAM(S): at 05:38

## 2022-09-20 RX ADMIN — Medication 500000 UNIT(S): at 05:36

## 2022-09-20 RX ADMIN — SPIRONOLACTONE 25 MILLIGRAM(S): 25 TABLET, FILM COATED ORAL at 05:37

## 2022-09-20 RX ADMIN — Medication 20 MILLIGRAM(S): at 05:37

## 2022-09-20 RX ADMIN — APIXABAN 5 MILLIGRAM(S): 2.5 TABLET, FILM COATED ORAL at 11:09

## 2022-09-20 RX ADMIN — Medication 1 APPLICATION(S): at 11:10

## 2022-09-20 RX ADMIN — Medication 1 TABLET(S): at 11:09

## 2022-09-20 RX ADMIN — QUETIAPINE FUMARATE 25 MILLIGRAM(S): 200 TABLET, FILM COATED ORAL at 21:54

## 2022-09-20 RX ADMIN — MEXILETINE HYDROCHLORIDE 200 MILLIGRAM(S): 150 CAPSULE ORAL at 05:37

## 2022-09-20 RX ADMIN — Medication 8: at 12:24

## 2022-09-20 RX ADMIN — Medication 4: at 00:25

## 2022-09-20 RX ADMIN — Medication 3 MILLILITER(S): at 17:02

## 2022-09-20 RX ADMIN — APIXABAN 5 MILLIGRAM(S): 2.5 TABLET, FILM COATED ORAL at 00:24

## 2022-09-20 RX ADMIN — HUMAN INSULIN 40 UNIT(S): 100 INJECTION, SUSPENSION SUBCUTANEOUS at 13:06

## 2022-09-20 RX ADMIN — Medication 25 MILLIGRAM(S): at 21:54

## 2022-09-20 RX ADMIN — HUMAN INSULIN 48 UNIT(S): 100 INJECTION, SUSPENSION SUBCUTANEOUS at 18:00

## 2022-09-20 NOTE — PROGRESS NOTE ADULT - ASSESSMENT
71F w/h/o uncontrolled T2DM (A1C 9.4%) on basal/bolus insulin PTA. Unknown DM complications. Also COPD, secondary adrenal Insufficiency on chronic steroids, colorectal cancer s/p resection (colostomy bag), Chronic A fib on Eliquis, and tracheomalacia and multiple intubations, recent dx of OM presented to ED at [facility] with epigastric pain, belching and central chest pain. Found on CTA to have type A aortic dissection and transferred to [facility] for surgical evaluation by Dr. Cabrera. Now s/p aortic dissection repair on 9/07/22. Endocrine consulted for assistance with transition from insulin drip to subcutaneous insulin while on TFs. Pt remains hyperglycemic while on present insulin doses even though she has received >300 units of insulin in the last 24 hours. Now starting POs in addition to TFs which will likely increase insulin requirements. Pt has multifactorial causes of hyperglycemia including steroid therapy, TFs, and nopw starting POs making gkycemic control more difficult to achieve. Will assess PO intake this pm and add premeal insulin as well if pt eats. Will continue to increase NPH insulin to BG goal 100 to 180s. Once pt is tolerating POs will discontinue TFs and start basal/bolus insulin therapy.     home medications: Lantus 35 units sq qhs, novolog TID (based on carb count usually around 10 units)

## 2022-09-20 NOTE — SWALLOW FEES ASSESSMENT ADULT - NS SWALLOW FEES REC ASPIR MON
Monitor for s/s aspiration/laryngeal penetration. If noted:  D/C p.o. intake, provide non-oral nutrition/hydration/meds, and contact this service @ [phone]/change of breathing pattern/cough/gurgly voice/fever/pneumonia/throat clearing/upper respiratory infection

## 2022-09-20 NOTE — SWALLOW FEES ASSESSMENT ADULT - ORAL PHASE COMMENTS
Adequate bolus control level of valleculae-->ice chips, moderately thick liquids, puree  level of pyriform sinuses--> mildly thick liquids level of valleculae-->ice chips, moderately thick liquids, puree  trace spillover to level of pyriform sinuses suspect due to bolus presentation--> mildly thick liquids via TSP. Improved with straw sips.

## 2022-09-20 NOTE — PROGRESS NOTE ADULT - ASSESSMENT
71 year-old female with a history of DM2, CAD, A-Fib on apixaban, Severe Persistent Asthma (on chronic steroids, recently started on Tezspire), colon cancer s/p resection/chemo, and tracheobronchomalacia s/p tracheoplasty, and recent OM of the R foot s/b debridement and completed course of Vancomycin/Ertapenem for MRSA/ESBL Proteus/Corynebacterium who now presents with chest pain, found to have Type A dissection s/p repair with modified Bentall procedure and hemiarch replacement on 9/6/22. Post-op course complicated by acute hypoxemic respiratory failure, shock, anemia, and hyperglycemia requiring insulin gtt. Extubated 9/13, now awake and alert with mild encephalopathy but overall significantly improved.    Assessment:  Acute hypoxemic respiratory failure requiring intubation  Type A Aortic Dissection  Severe Persistent Asthma  History of Tracheobronchomalacia    Plan:  - Currently doing well on NC O2-2 liters NC  - If respiratory status remains stable, goal SpO2>92%  - Continue prednisone at 8 mg daily (chronic dose for her severe persistent asthma)  - Albuterol and ipratropium nebs q6h    - Budesonide 0.5 mg nebs q12h    - Chest PT, incentive spirometry, out of bed to chair  - S/p Tezspire injection on 8/29  - montelukast 10 mg at bedtime (takes Zafirlukast as outpatient)  - Remains on heparin gtt for A-Fib + RIJ thrombus  - R arm elevation and warm compresses for superficial thrombophlebitis with edema  - Remains on mexiletine and metoprolol for A-Fib  - Currently appears euvolemic, on furosemide 40 mg oral daily, strict I/O's, close monitoring of kidney function and lytes  - On meropenem for Proteus mirabilis pneumonia + ESBL Proteus mirabilis infection of right foot, f/up ID and podiatry  - Discussed with patient and daughter Zoe at bedside, full code  **************************                                SEE Below:  9/14-improving but weakness  9/15-ABX adjusted to ceftriaxone (LFTS)-PICU  9/16-PICU, low grade temp-fever work up in progress, no resp decline or sx  9/19-resp stable at present but tenuous  9/20-for FEESST today, NGT in place w/TF

## 2022-09-20 NOTE — PROGRESS NOTE ADULT - ASSESSMENT
71 f with DM, CAD, a-fib, asthma/COPD, Chronic Adrenal Insufficiency on steroids, history of colorectal cancer s/p resection and ostomy, tracheomalacia and multiple intubations, recent admission for sepsis, foot osteo and abscess s/p debridement and OR cx with MRSA, ESBL proteus and corynebacterium s/p 6 weeks of vanco and ertapenem which ended 8/17, now p/w chest pain, found to have  type A aortic dissection, s/p modified Bentall and hemiarch on 9/6/22.   Post op course complicated by shock, respiratory failure, anemia and hyperglycemia.   fever started 9/9, sputum cx with proteus mirabilis    fever post op for aortic dissection, sputum cx with proteus, pt was still febrile on zosyn but last wound cx that showed proteus was ESBL, switched to suraj and still persistently febrile and the proteus now is pan sensitive, chest /abd CT 9/12 with small fluid around the ascending aorta but no enhancing or abscess, b/l lower lobe atelectasis, ?mild colitis  doppler: thrombosed and occluded RIJ  adrenal insufficiency, was on prednisone, was given dexa 9/12 and 9/13 and no more fevers until 16th, extubated 9/13 so the persistent fevers could be in the setting of adrenal insufficiency as imaging and cx negative, pt already extubated and no focal symptoms  blood and urine cx negative, ALT, AST increased today to 200s, no bili or ALK, unlikely due to suraj  penicillin and cefepime allergy in chart but pt has received cefepime and ceftriaxone before    * was on cefepime 9/9, switched to suraj 9/10, and then ceftriaxone on 6/14 to complete the course but pt became febrile  although clinically well, non toxic, vanco, suraj was resumed by CT on 9/16, blood cx negative and urine cx with candida albicans, discontinued 9/19  * I don't think candida albicans in the urine is responsible for patient's fevers,  fluconazole discontinued 9/19 ( received 4 days)   * will monitor off antibiotics, if fever or any change in the status will repeat cultures and reevaluate  * monitor WBC/diff and renal function      The above assessment and plan was discussed with CTU    Michelle Rolon MD  contact on teams  After 5pm and on weekends call [phone]

## 2022-09-20 NOTE — SWALLOW FEES ASSESSMENT ADULT - DIAGNOSTIC IMPRESSIONS
71F PMH h/o COPD, tracheomalacia s/p tracheoplasty, and multiple intubations. Found on CTA to have type A aortic dissection s/p repair, intubated x2. Patient p/w grossly functional oropharyngeal swallow for a regular texture diet. There is no laryngeal penetration/aspiration observed with all consistencies administered. There is single episode of baseline junky cough following trials, however no material observed supraglottic/subglottic. Diet modification is not warranted at this time and patient can resume a regular texture diet.

## 2022-09-20 NOTE — PROGRESS NOTE ADULT - SUBJECTIVE AND OBJECTIVE BOX
Patient seen and examined at the bedside.    Remained critically ill on continuous ICU monitoring.    OBJECTIVE:  Vital Signs Last 24 Hrs  T(C): 36.2 (20 Sep 2022 08:00), Max: 36.7 (19 Sep 2022 12:00)  T(F): 97.2 (20 Sep 2022 08:00), Max: 98.1 (19 Sep 2022 12:00)  HR: 94 (20 Sep 2022 09:00) (76 - 110)  BP: 123/87 (20 Sep 2022 09:00) (100/62 - 125/77)  BP(mean): 100 (20 Sep 2022 09:00) (72 - 100)  RR: 32 (20 Sep 2022 09:00) (16 - 32)  SpO2: 99% (20 Sep 2022 09:00) (94% - 100%)    Parameters below as of 20 Sep 2022 08:00  Patient On (Oxygen Delivery Method): room air          Physical Exam:   General: obese, elderly female, OOBTC, NAD  Neurology: arousable, following commands   Eyes: right pupil reactive to light, left surgical pupil  ENT/Neck: Neck supple, trachea midline, No JVD  Respiratory: Clear bilaterally   CV: S1S2, no murmurs        [x] Sternal dressing        [x] NSR  Abdominal: Soft, NT, ND, colostomy in place  Extremities: trace pedal edema noted, + peripheral pulses   Skin: Dry dressing over right heel, no Rashes, Hematoma, Ecchymosis    Assessment:  71F PMH DM, COPD, Chronic Adrenal Insufficiency on Chronic prednisone, history of colorectal cancer s/p resection (colostomy bag), Hx of CAD, Chronic A fib on Eliquis, and tracheomalacia and multiple intubations, recent dx of OM presented to ED at [facility] this morning with epigastric pain, belching and central chest pain. Found on CTA to have type A aortic dissection and transferred to [facility] for surgical evaluation by Dr. Cabrera.     Ascending aortic dissection s/p modified bentall and hemiarch on 9/6   Hypovolemic shock   Post op respiratory insufficiency  Acute blood loss anemia  Stress hyperglycemia   Lactic Acidosis  RIJ thrombus  Pancreatic cyst    Proteus PNA  Leukocytosis        Plan:   ***Neuro***  [x] Nonfocal   Post operative neuro assessment   Continue Seroquel, improvement in mental status    ***Cardiovascular***  CT Chest on 9/12:  Status post recent ascending aortic dissection repair. Small amount of fluid surrounding the ascending aorta without evidence of enhancing wall to suggest abscess.  RUE duplex on 9/12: There is a thrombosed and occluded RIJ   Invasive hemodynamic monitoring, assess perfusion indices   SR /MAP 78/Hct 36.1/ Lactate 2.3  Continue lopressor for afib prophylaxis and rate control  Continue Mexiletine    [x] AC therapy with Eliquis     Lisinopril dc'ed due to hypotension      ***Pulmonary***  CT Chest on 9/12: Bilateral lower lobe atelectasis with bilateral pleural effusions   Reintubated for change in mental status on 9/8, self extubated on 9/9 and reintubated again, extubated to HFO2 on 9/13, now sating on room air in 90s  Encourage incentive spirometry, continue pulse ox monitoring, follow ABGs   Hx of COPD / Continue bronchodilators and Prednisone   Monitor secretions and suction PRN               ***GI***  CT A/P on 9/12: Mild thickening of the cecum and ascending colon possibly representing mild nonspecific proximal colitis. Hepatic cirrhosis. The focal IPMN of the pancreas with large cyst within the tail the pancreas measuring 3.1 cm.  Colostomy bag in place, continue to monitor output   [x] Diet: TFs Glucerna at goal 60 cc/hr  [x] Protonix   --> FEES maybe Tuesday this week. keep NPO w/ TF till then       ***Renal***  Continue to monitor I/Os, BUN/Creatinine.   Replete lytes PRN  Amezcua present  Continue diuresis with Lasix and Aldactone      ***ID***  SCx on 9/8 +Proteus mirabilis   UA on 9/9+ LE, WBC 60, few yeast  /  UCx on 9/10 NG   BCx on 9/10 NGTD, BCx on 9/12 NGTD 9/16 bcx NGTD  Nystatin for thrush   Diflucan added yesterday for Yeast found in Urine on 9/16  Antibiotics discontinued due to lack of source per ID rec. Leukocytosis improving. Afebrile. Will monitor off antibiotics.        ***Endocrine***  [x]  DM : HbA1c 9.4%                - [x] Insulin gtt [x] ISS [x] NPH             - Need tight glycemic control to prevent wound infection.      Patient requires continuous monitoring with bedside rhythm monitoring, pulse oximetry monitoring, and continuous central venous and arterial pressure monitoring; and intermittent blood gas analysis. Care plan discussed with the ICU care team.   Patient remained critical, at risk for life threatening decompensation.    I have spent 30 minutes providing critical care management to this patient.    By signing my name below, I, Kieran Plascencia, attest that this documentation has been prepared under the direction and in the presence of Simone Garcia NP   Electronically signed: Rogers Cordero, 09-20-22 @ 09:40    EVERETTE, Simone Garcia NP, personally performed the services described in this documentation. all medical record entries made by the rogers were at my direction and in my presence. I have reviewed the chart and agree that the record reflects my personal performance and is accurate and complete  Electronically signed: Simone Garcia NP    Patient seen and examined at the bedside.    Remained critically ill on continuous ICU monitoring.    OBJECTIVE:  Vital Signs Last 24 Hrs  T(C): 36.2 (20 Sep 2022 08:00), Max: 36.7 (19 Sep 2022 12:00)  T(F): 97.2 (20 Sep 2022 08:00), Max: 98.1 (19 Sep 2022 12:00)  HR: 94 (20 Sep 2022 09:00) (76 - 110)  BP: 123/87 (20 Sep 2022 09:00) (100/62 - 125/77)  BP(mean): 100 (20 Sep 2022 09:00) (72 - 100)  RR: 32 (20 Sep 2022 09:00) (16 - 32)  SpO2: 99% (20 Sep 2022 09:00) (94% - 100%)    Parameters below as of 20 Sep 2022 08:00  Patient On (Oxygen Delivery Method): room air          Physical Exam:   General: obese, elderly female, OOBTC, NAD  Neurology: arousable, following commands   Eyes: right pupil reactive to light, left surgical pupil  ENT/Neck: Neck supple, trachea midline, No JVD  Respiratory: Clear bilaterally   CV: S1S2, no murmurs        [x] Sternal dressing        [x] NSR  Abdominal: Soft, NT, ND, colostomy in place  Extremities: trace pedal edema noted, + peripheral pulses   Skin: Dry dressing over right heel, no Rashes, Hematoma, Ecchymosis    Assessment:  71F PMH DM, COPD, Chronic Adrenal Insufficiency on Chronic prednisone, history of colorectal cancer s/p resection (colostomy bag), Hx of CAD, Chronic A fib on Eliquis, and tracheomalacia and multiple intubations, recent dx of OM presented to ED at [facility] this morning with epigastric pain, belching and central chest pain. Found on CTA to have type A aortic dissection and transferred to [facility] for surgical evaluation by Dr. Cabrera.     Ascending aortic dissection s/p modified bentall and hemiarch on 9/6   Hypovolemic shock   Post op respiratory insufficiency  Acute blood loss anemia  Stress hyperglycemia   Lactic Acidosis  RIJ thrombus  Pancreatic cyst    Proteus PNA  Leukocytosis        Plan:   ***Neuro***  [x] Nonfocal   Post operative neuro assessment   Continue Seroquel, improvement in mental status    ***Cardiovascular***  CT Chest on 9/12:  Status post recent ascending aortic dissection repair. Small amount of fluid surrounding the ascending aorta without evidence of enhancing wall to suggest abscess.  RUE duplex on 9/12: There is a thrombosed and occluded RIJ   Invasive hemodynamic monitoring, assess perfusion indices   SR /MAP 78/Hct 36.1/ Lactate 2.3  Continue lopressor for afib prophylaxis and rate control  Continue Mexiletine    [x] AC therapy with Eliquis     Lisinopril dc'ed due to hypotension      ***Pulmonary***  CT Chest on 9/12: Bilateral lower lobe atelectasis with bilateral pleural effusions   Reintubated for change in mental status on 9/8, self extubated on 9/9 and reintubated again, extubated to HFO2 on 9/13, now sating on room air in 90s  Encourage incentive spirometry, continue pulse ox monitoring, follow ABGs   Hx of COPD / Continue bronchodilators and Prednisone   Monitor secretions and suction PRN               ***GI***  CT A/P on 9/12: Mild thickening of the cecum and ascending colon possibly representing mild nonspecific proximal colitis. Hepatic cirrhosis. The focal IPMN of the pancreas with large cyst within the tail the pancreas measuring 3.1 cm.  Colostomy bag in place, continue to monitor output   [x] Diet: TFs Glucerna at goal 60 cc/hr  [x] Protonix   Passed FEES today, CC diet with reg liquids, will see how patients appetite is prior to shutting off tubes feeds      ***Renal***  Continue to monitor I/Os, BUN/Creatinine.   Replete lytes PRN  Amezcua present  Continue diuresis with Lasix and Aldactone      ***ID***  SCx on 9/8 +Proteus mirabilis   UA on 9/9+ LE, WBC 60, few yeast  /  UCx on 9/10 NG   BCx on 9/10 NGTD, BCx on 9/12 NGTD 9/16 bcx NGTD  Nystatin for thrush   Diflucan added yesterday for Yeast found in Urine on 9/16  Antibiotics discontinued due to lack of source per ID rec. Leukocytosis improving. Afebrile. Will monitor off antibiotics.        ***Endocrine***  [x]  DM : HbA1c 9.4%                - [x] Insulin gtt [x] ISS [x] NPH             - Need tight glycemic control to prevent wound infection.      Patient requires continuous monitoring with bedside rhythm monitoring, pulse oximetry monitoring, and continuous central venous and arterial pressure monitoring; and intermittent blood gas analysis. Care plan discussed with the ICU care team.   Patient remained critical, at risk for life threatening decompensation.    I have spent 35 minutes providing critical care management to this patient.    By signing my name below, I, Kieran Plascencia, attest that this documentation has been prepared under the direction and in the presence of Simone Garcia NP   Electronically signed: Rogers Cordero, 09-20-22 @ 09:40    I, Simone Garcia NP, personally performed the services described in this documentation. all medical record entries made by the rogers were at my direction and in my presence. I have reviewed the chart and agree that the record reflects my personal performance and is accurate and complete  Electronically signed: Simone Garcia NP

## 2022-09-20 NOTE — PROGRESS NOTE ADULT - TIME BILLING
as above: less mucusy-FEESST study today, remains in PICU  multifactorial dyspnea-resp failure--severe persistent asthma, TBM s/p tracheoplasty, s/p Aortic aneurysm repair, Bronchitis (proteus)-O2 NC-keep 90%  severe persistent asthma--medrol 8mg, singulair 10, duoneb q 6, budes .5 bid, incentive spirometry, tezspire 8/29-next 9/29  TBM-s/p tracheoplasty--accapella, unable to use vest due to surgery  s/p Aortic aneurysm repair--as per [facility] staff care  AF-on heparin--eventual eliquis rx  DVT R-IJ-on heparin rx  ID-proteus bronchitisi-meropenum changed to ceftriaxone and back to meropenem/vanco as per            ID-? duration as new temp was noted on 9/16-FWUp neg--rec DC of ABX 9/19  PT-OOB as able           VC dysfunction--aspiration precautions-sp and sw evaln--NGT in place/TF; FEESST today  GOC                                    Heme onc f/up colon ca  prog--guarded in CTU/PICU                PT--OOB as much as possible and all neb rx is to be HHN and not FM (d/w nursing)    Eulalio Allison MD-Pulmonary   [phone]

## 2022-09-20 NOTE — SWALLOW FEES ASSESSMENT ADULT - COMMENTS
+ small glottic gap   + thick dried adhered secretions to NGT, in pyriform sinuses, and valleculae Pt known to this service. Patient seen for multiple bedside swallow evaluations during previous admissions. Seen for FEES 3/31/22 -->recommendations for puree/thin lquids due to trace penetration of minced and moist trial without immediate retrieval. 5/12/2022 MBS --> recommendations for regular solids/thin liquids, no laryngeal penetration/aspiration seen across textures.  During this admission, consulted 9/9/22, however pt currently intubated.    Seen by this service 9/14 for a bedside swallow evaluation with recommendations for soft-bite sized solids/mildly thick liquids. Plan for MBS pending transfer to floor. However, pt transferred to PICU and developed new baseline cough, leukocytosis, intermittent delirium and agitation, and ongoing workup for infection. Seen for follow up 9/16 with recommendations for NPO, with non-oral nutrition/hydration/medications pending workup and improvement in mentation. Tentative plan for FEES 9/20.    As per ID 9/20 --> "pt was on cefepime 9/9, switched to suraj 9/10, and then ceftriaxone on 6/14 to complete the course but pt became febrile  although clinically well, non toxic, vanco, suraj was resumed by CT on 9/16, blood cx negative and urine cx with candida albicans, discontinued 9/19. I don't think candida albicans in the urine is responsible for patient's fevers,  fluconazole discontinued 9/19 ( received 4 days), will monitor off antibiotics, if fever or any change in the status will repeat cultures and reevaluate, monitor WBC/diff and renal function. + small glottic gap   + moderate thick dried adhered secretions to NGT, in pyriform sinuses, and valleculae --> eventually cleared s/p thin liquid trials    Fiberoptic endoscope passed transnasally via SLP Isabel Espinosa in conjunction with SLP Fannie Adamson. Endoscope retracted without incident.

## 2022-09-20 NOTE — PROGRESS NOTE ADULT - PROBLEM SELECTOR PLAN 1
- FS q1h if on insulin gtt   - FS q6h while on TFs. Once off TFs ac and hs and 2am.   - Increased NPH to 40 q6h while on TFs   -Will add premeal insulin once pt eating consistently  -Start calorie count  -Once off TFs can consider Lantus 30 units (home dose 35) plus Admelog 10 (home dose) Make sure pt eats.   -Restart insulin drip if BG doesn't improve while on high NPH doses. Noted BG elevated since sq insulin was started. Assessed for edema to ensured sq insulin is absorbed but pt has no edema in arms where insulin is administered.    - Hold NPH if TFs are off/held  - Please contact endo team with any changes on TF/PO/Steroid status  Discharge plan:  - Likely to discharge patient home on basal/bolus insulin. Final regimen pending clinical course.  - Recommend routine outpatient ophthalmology, podiatry  - Can f/u with endocrinologist Dr. Saavedra.  - Make sure pt has Rx for all DM supplies and insulin/ DM meds.  -Based on pt's clinical condition and mental status at this time she is not able to independently manage her DM. Will evaluate pt's ability to manage DM at time of discharge. If unable> pt will need family/ care giver (s) to manage DM care at home  -Might need rehab.

## 2022-09-20 NOTE — SWALLOW FEES ASSESSMENT ADULT - PHARYNGEAL PHASE COMMENTS
Single episode of coughing observed s/p mildly thick liquids, however no material observed supraglottic/subglottic.

## 2022-09-20 NOTE — PROGRESS NOTE ADULT - NUTRITIONAL ASSESSMENT
Diet, Consistent Carbohydrate/No Snacks:   DASH/TLC {Sodium & Cholesterol Restricted} (DASH)  Tube Feeding Modality: Nasogastric  Glucerna 1.5 Chago (GLUCERNA1.5RTH)  Total Volume for 24 Hours (mL): 1440  Continuous  Starting Tube Feed Rate {mL per Hour}: 60  Until Goal Tube Feed Rate (mL per Hour): 60  Tube Feed Duration (in Hours): 24  Tube Feed Start Time: 13:00  Supplement Feeding Modality:  Oral  Glucerna Shake Cans or Servings Per Day:  3       Frequency:  Daily (09-20-22 @ 13:00) [Active]      Please see RD assessment and/or follow up.  Managed by primary team as well

## 2022-09-20 NOTE — SWALLOW FEES ASSESSMENT ADULT - SLP PATIENT PROFILE REVIEW
Allergies: aspirin, Avelox, cefepime, codeine, Dilaudid, iodine, penicillin, tetanus toxoid, Valium, shellfish

## 2022-09-20 NOTE — SWALLOW FEES ASSESSMENT ADULT - ORAL PHASE
Slowed, however functional mastication of regular solids/Uncontrolled bolus/spillover in dorothea-pharynx Functional mastication of regular solids

## 2022-09-20 NOTE — PROGRESS NOTE ADULT - SUBJECTIVE AND OBJECTIVE BOX
CHIEF COMPLAINT: f/up sob, chronic resp failure, TBM, severe persistent asthma, VC dysfunction, Type A aortic dissection s/p repair w/modified "Bentall procedure and hemiarch replacement 9/6-comfortable despite NGT, some mucus and cough, no sob    Interval Events: FEESST today    REVIEW OF SYSTEMS:  Constitutional: No fevers or chills. No weight loss. + fatigue or generalized malaise.  Eyes: No itching or discharge from the eyes  ENT: No ear pain. No ear discharge. No nasal congestion. No post nasal drip. No epistaxis. No throat pain. No sore throat. No difficulty swallowing.   CV: No chest pain. No palpitations. No lightheadedness or dizziness.   Resp: No dyspnea at rest. No dyspnea on exertion. No orthopnea. No wheezing. + cough. No stridor. No sputum production. No chest pain with respiration.  GI: No nausea. No vomiting. No diarrhea.  MSK: No joint pain or pain in any extremities  Integumentary: No skin lesions. No pedal edema.  Neurological: + gross motor weakness. No sensory changes.  [+ ] All other systems negative  [ ] Unable to assess ROS because ________    OBJECTIVE:  ICU Vital Signs Last 24 Hrs  T(C): 36.4 (20 Sep 2022 04:00), Max: 36.7 (19 Sep 2022 08:00)  T(F): 97.6 (20 Sep 2022 04:00), Max: 98.1 (19 Sep 2022 08:00)  HR: 88 (20 Sep 2022 04:00) (76 - 110)  BP: 100/62 (20 Sep 2022 04:00) (100/62 - 124/76)  BP(mean): 74 (20 Sep 2022 04:00) (72 - 89)  ABP: 96/50 (20 Sep 2022 04:00) (94/47 - 160/73)  ABP(mean): 65 (20 Sep 2022 04:00) (63 - 268)  RR: 21 (20 Sep 2022 04:00) (16 - 31)  SpO2: 99% (20 Sep 2022 04:00) (94% - 100%)    O2 Parameters below as of 20 Sep 2022 04:00  Patient On (Oxygen Delivery Method): room air              09-18 @ 07:01 - 09-19 @ 07:00  --------------------------------------------------------  IN: 2402 mL / OUT: 3675 mL / NET: -1273 mL    09-19 @ 07:01 - 09-20 @ 05:23  --------------------------------------------------------  IN: 1530 mL / OUT: 2400 mL / NET: -870 mL      CAPILLARY BLOOD GLUCOSE  302 (19 Sep 2022 18:00)      POCT Blood Glucose.: 201 mg/dL (20 Sep 2022 00:00)      PHYSICAL EXAM: NAD in bed on NC O2/NGT in place  General: Awake, alert, oriented X 3.   HEENT: Atraumatic, normocephalic.                 Mallampatti Grade 2                No nasal congestion.                No tonsillar or pharyngeal exudates.  Lymph Nodes: No palpable lymphadenopathy  Neck: No JVD. No carotid bruit.   Respiratory: Normal chest expansion                         Normal percussion                         Normal and equal air entry                         No wheeze, +rhonchi or rales.  Cardiovascular: S1 S2 normal. No murmurs, rubs or gallops.   Abdomen: Soft, non-tender, non-distended. No organomegaly. Normoactive bowel sounds.  Extremities: Warm to touch. Peripheral pulse palpable. No pedal edema.   Skin: No rashes or skin lesions  Neurological: Motor and sensory examination equal and normal in all four extremities.  Psychiatry: Appropriate mood and affect.    HOSPITAL MEDICATIONS:  MEDICATIONS  (STANDING):  albuterol/ipratropium for Nebulization 3 milliLiter(s) Nebulizer every 6 hours  apixaban 5 milliGRAM(s) Oral every 12 hours  buDESOnide    Inhalation Suspension 0.5 milliGRAM(s) Inhalation every 12 hours  chlorhexidine 2% Cloths 1 Application(s) Topical <User Schedule>  collagenase Ointment 1 Application(s) Topical daily  furosemide    Tablet 20 milliGRAM(s) Oral daily  insulin lispro (ADMELOG) corrective regimen sliding scale   SubCutaneous every 6 hours  insulin NPH human recombinant 28 Unit(s) SubCutaneous every 6 hours  metoprolol tartrate 25 milliGRAM(s) Oral every 8 hours  mexiletine 200 milliGRAM(s) Oral every 8 hours  montelukast 10 milliGRAM(s) Oral at bedtime  multivitamin 1 Tablet(s) Oral daily  nystatin    Suspension 173208 Unit(s) Oral every 6 hours  pantoprazole  Injectable 40 milliGRAM(s) IV Push daily  predniSONE   Tablet 8 milliGRAM(s) Oral daily  QUEtiapine 25 milliGRAM(s) Oral at bedtime  spironolactone 25 milliGRAM(s) Oral every 12 hours    MEDICATIONS  (PRN):  acetaminophen    Suspension .. 675 milliGRAM(s) Oral every 6 hours PRN Moderate Pain (4 - 6)  albuterol/ipratropium for Nebulization 3 milliLiter(s) Nebulizer every 12 hours PRN Shortness of Breath and/or Wheezing      LABS:                        11.1   16.77 )-----------( 460      ( 20 Sep 2022 00:29 )             36.1     09-20    135  |  99  |  28<H>  ----------------------------<  220<H>  5.1   |  24  |  0.73    Ca    9.3      20 Sep 2022 00:30  Phos  3.3     09-20  Mg     2.4     09-20    TPro  7.3  /  Alb  3.6  /  TBili  0.3  /  DBili  x   /  AST  24  /  ALT  76<H>  /  AlkPhos  118  09-20        Arterial Blood Gas:  09-19 @ 23:56  7.49/34/90/26/97.2/2.7  ABG lactate: --  Arterial Blood Gas:  09-19 @ 00:10  7.45/43/80/30/95.7/5.3  ABG lactate: --  Arterial Blood Gas:  09-18 @ 06:15  7.48/38/85/28/97.5/4.5  ABG lactate: --        MICROBIOLOGY:     RADIOLOGY:  [ ] Reviewed and interpreted by me    Point of Care Ultrasound Findings:    PFT:    EKG:

## 2022-09-20 NOTE — PROGRESS NOTE ADULT - NSPROGADDITIONALINFOA_GEN_ALL_CORE
-Plan discussed with pt/team.  Contact info: [phone] (24/7). pager [phone]  [email or web address] password [email or web address]  Spent 28 minutes assessing pt/labs/meds and discussing plan of care with primary team  Adjusting insulin  Discharge plan  Follow up care

## 2022-09-20 NOTE — PROGRESS NOTE ADULT - SUBJECTIVE AND OBJECTIVE BOX
DIABETES FOLLOW UP NOTE: Saw pt earlier today    Chief Complaint: Endocrine consult requested for management of T2DM    INTERVAL HX: Pt stable, states she is waiting to go for a walk >denies any pain. Per staff, pt tolerating TFs of Glucerna at 60cc/hr. Switched to sq insulin yesterday with BG levels 200s to 300s on high NPH doses. NPH increased from 20 to 24 to 28 to 32 and to 40 in the last 24 hours without improvement on BG levels. Received >300s units of insulin in the last 24 hours. Primary team contacted this provider ac lunch to inform that pt passed swallowing test and will start consistent carb diet this pm in addition to TFs.  Noted insulin drip restarted briefly this am to correct hyperglycemia but is not off. No hypoglycemia. On Prednisone 8mg daily.       Review of Systems:  General: As above  Cardiovascular: No chest pain, palpitations  Respiratory: No SOB, no cough  GI: No nausea, vomiting, abdominal pain  Endocrine: no polyuria, polydipsia or S&Sx of hypoglycemia    Allergies    aspirin (Short breath)  Avelox (Short breath; Pruritus)  cefepime (Anaphylaxis)  codeine (Short breath)  Dilaudid (Short breath)  iodine (Short breath; Swelling)  penicillin (Anaphylaxis)  shellfish (Anaphylaxis)  tetanus toxoid (Short breath)  Valium (Short breath)    Intolerances      MEDICATIONS:  insulin lispro (ADMELOG) corrective regimen sliding scale   SubCutaneous every 6 hours  insulin NPH human recombinant 40 Unit(s) SubCutaneous every 6 hours  insulin regular Infusion 4 Unit(s)/Hr (4 mL/Hr) IV Continuous <Continuous>  predniSONE   Tablet 8 milliGRAM(s) Oral daily      PHYSICAL EXAM:  VITALS: T(C): 35.9 (09-20-22 @ 12:00)  T(F): 96.7 (09-20-22 @ 12:00), Max: 98.1 (09-19-22 @ 20:00)  HR: 63 (09-20-22 @ 14:00) (63 - 103)  BP: 132/66 (09-20-22 @ 14:00) (100/62 - 132/66)  RR:  (14 - 32)  SpO2:  (94% - 100%)  Wt(kg): --  GENERAL: Female lying in bed in NAD  HEENT: NGT in place with TFs at 60cc/hr at time of visit  CHEST: Surgical site with dressing D&I  Abdomen: Soft, nontender, non distended  Extremities: Warm, no edema in all 4 exts  NEURO: Alert answering questions but appears somewhat confused.    LABS:  POCT Blood Glucose.: 314 mg/dL (09-20-22 @ 12:10)  POCT Blood Glucose.: 222 mg/dL (09-20-22 @ 09:56)  POCT Blood Glucose.: 153 mg/dL (09-20-22 @ 09:03)  POCT Blood Glucose.: 165 mg/dL (09-20-22 @ 08:07)  POCT Blood Glucose.: 282 mg/dL (09-20-22 @ 06:56)  POCT Blood Glucose.: 255 mg/dL (09-20-22 @ 05:35)  POCT Blood Glucose.: 201 mg/dL (09-20-22 @ 00:00)  POCT Blood Glucose.: 302 mg/dL (09-19-22 @ 17:34)  POCT Blood Glucose.: 120 mg/dL (09-19-22 @ 12:24)  POCT Blood Glucose.: 116 mg/dL (09-19-22 @ 10:57)  POCT Blood Glucose.: 195 mg/dL (09-19-22 @ 10:07)  POCT Blood Glucose.: 176 mg/dL (09-19-22 @ 09:08)  POCT Blood Glucose.: 167 mg/dL (09-19-22 @ 08:06)  POCT Blood Glucose.: 148 mg/dL (09-19-22 @ 06:55)  POCT Blood Glucose.: 193 mg/dL (09-19-22 @ 05:58)  POCT Blood Glucose.: 187 mg/dL (09-19-22 @ 05:28)  POCT Blood Glucose.: 230 mg/dL (09-19-22 @ 03:58)  POCT Blood Glucose.: 220 mg/dL (09-19-22 @ 02:56)  POCT Blood Glucose.: 127 mg/dL (09-19-22 @ 01:01)  POCT Blood Glucose.: 107 mg/dL (09-19-22 @ 00:35)  POCT Blood Glucose.: 99 mg/dL (09-19-22 @ 00:07)  POCT Blood Glucose.: 115 mg/dL (09-18-22 @ 23:02)  POCT Blood Glucose.: 152 mg/dL (09-18-22 @ 22:20)  POCT Blood Glucose.: 178 mg/dL (09-18-22 @ 21:11)  POCT Blood Glucose.: 138 mg/dL (09-18-22 @ 19:59)  POCT Blood Glucose.: 154 mg/dL (09-18-22 @ 18:52)  POCT Blood Glucose.: 164 mg/dL (09-18-22 @ 17:59)  POCT Blood Glucose.: 127 mg/dL (09-18-22 @ 17:16)  POCT Blood Glucose.: 81 mg/dL (09-18-22 @ 16:29)  POCT Blood Glucose.: 148 mg/dL (09-18-22 @ 13:49)  POCT Blood Glucose.: 141 mg/dL (09-18-22 @ 13:06)  POCT Blood Glucose.: 208 mg/dL (09-18-22 @ 12:05)  POCT Blood Glucose.: 176 mg/dL (09-18-22 @ 11:12)  POCT Blood Glucose.: 154 mg/dL (09-18-22 @ 10:12)  POCT Blood Glucose.: 146 mg/dL (09-18-22 @ 09:18)  POCT Blood Glucose.: 157 mg/dL (09-18-22 @ 07:59)  POCT Blood Glucose.: 180 mg/dL (09-18-22 @ 06:49)  POCT Blood Glucose.: 205 mg/dL (09-18-22 @ 06:06)  POCT Blood Glucose.: 197 mg/dL (09-18-22 @ 05:26)  POCT Blood Glucose.: 171 mg/dL (09-18-22 @ 04:32)  POCT Blood Glucose.: 202 mg/dL (09-18-22 @ 03:47)  POCT Blood Glucose.: 143 mg/dL (09-18-22 @ 02:11)  POCT Blood Glucose.: 133 mg/dL (09-18-22 @ 01:25)  POCT Blood Glucose.: 132 mg/dL (09-18-22 @ 00:34)                            11.1   16.77 )-----------( 460      ( 20 Sep 2022 00:29 )             36.1       09-20    135  |  99  |  28<H>  ----------------------------<  220<H>  5.1   |  24  |  0.73    eGFR: 87    Ca    9.3      09-20  Mg     2.4     09-20  Phos  3.3     09-20    TPro  7.3  /  Alb  3.6  /  TBili  0.3  /  DBili  x   /  AST  24  /  ALT  76<H>  /  AlkPhos  118  09-20    Thyroid Function Tests:  09-06 @ 16:46 TSH 3.30 FreeT4 -- T3 -- Anti TPO -- Anti Thyroglobulin Ab -- TSI --      A1C with Estimated Average Glucose Result: 9.4 % (09-06-22 @ 16:46)  A1C with Estimated Average Glucose Result: 9.2 % (07-11-22 @ 07:36)      Estimated Average Glucose: 223 mg/dL (09-06-22 @ 16:46)  Estimated Average Glucose: 217 mg/dL (07-11-22 @ 07:36)

## 2022-09-20 NOTE — SWALLOW FEES ASSESSMENT ADULT - SLP PERTINENT HISTORY OF CURRENT PROBLEM
71F PMH DM, COPD, Chronic Adrenal Insufficiency on Chronic prednisone, history of colorectal cancer s/p resection (colostomy bag), Hx of CAD, Chronic A fib on Eliquis, and tracheomalacia s/p tracheoplasty, and multiple intubations, recent dx of OM presented to ED at [facility] this morning with epigastric pain, belching and central chest pain. Found on CTA to have type A aortic dissection and transferred to [facility] for surgical evaluation by Dr. Cabrera. Pt admitted for ascending aortic dissection. Patient went to OR for emergency type A aortic dissection repair with Dr. Cabrera. Extubated post-op on 9/7. On 9/8 pt  developed increasingly labored breathing and due to mild delirium and depressed mental status post operatively, patient was reintubated. Extubated 9/13.

## 2022-09-20 NOTE — PROGRESS NOTE ADULT - SUBJECTIVE AND OBJECTIVE BOX
Follow Up:  fever    Interval History/ROS: antibiotics stopped yesterday, pt remains afebrile and WBC is gradually improving, c/o SOB today but no chest pain or cough, was asking for nebulizers      Allergies  aspirin (Short breath)  Avelox (Short breath; Pruritus)  cefepime (Anaphylaxis)  codeine (Short breath)  Dilaudid (Short breath)  iodine (Short breath; Swelling)  penicillin (Anaphylaxis)  shellfish (Anaphylaxis)  tetanus toxoid (Short breath)  Valium (Short breath)        ANTIMICROBIALS:  nystatin    Suspension 919517 every 6 hours      OTHER MEDS:  acetaminophen    Suspension .. 675 milliGRAM(s) Oral every 6 hours PRN  albuterol/ipratropium for Nebulization 3 milliLiter(s) Nebulizer every 12 hours PRN  albuterol/ipratropium for Nebulization 3 milliLiter(s) Nebulizer every 6 hours  apixaban 5 milliGRAM(s) Oral every 12 hours  buDESOnide    Inhalation Suspension 0.5 milliGRAM(s) Inhalation every 12 hours  chlorhexidine 2% Cloths 1 Application(s) Topical <User Schedule>  collagenase Ointment 1 Application(s) Topical daily  dextrose 50% Injectable 50 milliLiter(s) IV Push every 15 minutes  furosemide    Tablet 20 milliGRAM(s) Oral daily  insulin lispro (ADMELOG) corrective regimen sliding scale   SubCutaneous every 6 hours  insulin NPH human recombinant 32 Unit(s) SubCutaneous every 6 hours  insulin regular Infusion 4 Unit(s)/Hr IV Continuous <Continuous>  metoprolol tartrate 25 milliGRAM(s) Oral every 8 hours  mexiletine 200 milliGRAM(s) Oral every 8 hours  montelukast 10 milliGRAM(s) Oral at bedtime  multivitamin 1 Tablet(s) Oral daily  pantoprazole  Injectable 40 milliGRAM(s) IV Push daily  predniSONE   Tablet 8 milliGRAM(s) Oral daily  QUEtiapine 25 milliGRAM(s) Oral at bedtime  spironolactone 25 milliGRAM(s) Oral every 12 hours      Vital Signs Last 24 Hrs  T(C): 36.2 (20 Sep 2022 08:00), Max: 36.7 (19 Sep 2022 12:00)  T(F): 97.2 (20 Sep 2022 08:00), Max: 98.1 (19 Sep 2022 12:00)  HR: 70 (20 Sep 2022 10:00) (70 - 110)  BP: 125/68 (20 Sep 2022 10:00) (100/62 - 125/77)  BP(mean): 81 (20 Sep 2022 10:00) (72 - 100)  RR: 28 (20 Sep 2022 10:00) (16 - 32)  SpO2: 100% (20 Sep 2022 10:00) (94% - 100%)    Parameters below as of 20 Sep 2022 08:00  Patient On (Oxygen Delivery Method): room air        Physical Exam:  General:   non toxic, sitting on a chair  Respiratory:   slightly tachypneic but clear b/l,    no wheezing  abd:     soft,   BS +,   no tenderness, ostomy with liquid brown stool  :   no CVAT,  no suprapubic tenderness,   no  ramirez  Musculoskeletal:   no joint swelling  vascular: R chest port with no tenderness or erythema  Skin:    no rash                        11.1   16.77 )-----------( 460      ( 20 Sep 2022 00:29 )             36.1       09-20    135  |  99  |  28<H>  ----------------------------<  220<H>  5.1   |  24  |  0.73    Ca    9.3      20 Sep 2022 00:30  Phos  3.3     09-20  Mg     2.4     09-20    TPro  7.3  /  Alb  3.6  /  TBili  0.3  /  DBili  x   /  AST  24  /  ALT  76<H>  /  AlkPhos  118  09-20          MICROBIOLOGY:  v  Catheterized Catheterized  09-16-22   50,000 - 99,000 CFU/mL Candida albicans "Susceptibilities not performed"  --  --      .Blood Blood-Peripheral  09-16-22   No growth to date.  --  --      .Blood Blood  09-12-22   No Growth Final  --  --      .Blood Blood  09-10-22   No Growth Final  --  --      Catheterized Catheterized  09-10-22   <10,000 CFU/mL Normal Urogenital Jessica  --  --      .Sputum Sputum  09-08-22   Numerous Proteus mirabilis  Unable to evaluate further due to Proteus overgrowth  --  Proteus mirabilis                RADIOLOGY:  Images independently visualized and reviewed personally, findings as below  < from: Xray Chest 1 View- PORTABLE-Routine (Xray Chest 1 View- PORTABLE-Routine in AM.) (09.19.22 @ 02:19) >  IMPRESSION:  Lines and tubes, as above.  Bilateral pleural effusions, left greater than right.      < end of copied text >  < from: Intra-Operative Transesophageal Echo (09.06.22 @ 22:54) >  Conclusions:  1. Normal mitral valve. Mild mitral regurgitation.  2. Calcified trileaflet aortic valve with decreased  opening. Moderate aortic regurgitation.  3. Aortic Annulus: 1.9 cm.  Aortic Root: 3.9 cm.  Sinotubular Junction: 4 cm.  LVOT diameter: 1.9 cm.  A dissection flap was noted in the proximal ascending aorta  extending into arch and decscending thoracic and abdominal  aorta. A clear end of dissection was by SAFIA in transgastric  views.  SAFIA guidaince for aortic cannulation of true lumen  provided.  4. Moderate concentric left ventricular hypertrophy.  5. Normal left ventricular systolic function. No segmental  wall motion abnormalities.  6. Normal right atrium.  7. Normal right ventricular size and function.  8. Normal tricuspid valve. Mild tricuspid regurgitation.  9. Moderate pericardial effusion with no tamponade noted  pre-bypass    < end of copied text >

## 2022-09-21 LAB
ALBUMIN SERPL ELPH-MCNC: 3.8 G/DL — SIGNIFICANT CHANGE UP (ref 3.3–5)
ALP SERPL-CCNC: 124 U/L — HIGH (ref 40–120)
ALT FLD-CCNC: 65 U/L — HIGH (ref 10–45)
ANION GAP SERPL CALC-SCNC: 13 MMOL/L — SIGNIFICANT CHANGE UP (ref 5–17)
AST SERPL-CCNC: 27 U/L — SIGNIFICANT CHANGE UP (ref 10–40)
BILIRUB SERPL-MCNC: 0.2 MG/DL — SIGNIFICANT CHANGE UP (ref 0.2–1.2)
BUN SERPL-MCNC: 33 MG/DL — HIGH (ref 7–23)
CALCIUM SERPL-MCNC: 9.4 MG/DL — SIGNIFICANT CHANGE UP (ref 8.4–10.5)
CHLORIDE SERPL-SCNC: 99 MMOL/L — SIGNIFICANT CHANGE UP (ref 96–108)
CO2 SERPL-SCNC: 25 MMOL/L — SIGNIFICANT CHANGE UP (ref 22–31)
CREAT SERPL-MCNC: 0.72 MG/DL — SIGNIFICANT CHANGE UP (ref 0.5–1.3)
CULTURE RESULTS: SIGNIFICANT CHANGE UP
CULTURE RESULTS: SIGNIFICANT CHANGE UP
EGFR: 88 ML/MIN/1.73M2 — SIGNIFICANT CHANGE UP
GAS PNL BLDA: SIGNIFICANT CHANGE UP
GLUCOSE BLDC GLUCOMTR-MCNC: 108 MG/DL — HIGH (ref 70–99)
GLUCOSE BLDC GLUCOMTR-MCNC: 108 MG/DL — HIGH (ref 70–99)
GLUCOSE BLDC GLUCOMTR-MCNC: 326 MG/DL — HIGH (ref 70–99)
GLUCOSE BLDC GLUCOMTR-MCNC: 344 MG/DL — HIGH (ref 70–99)
GLUCOSE BLDC GLUCOMTR-MCNC: 89 MG/DL — SIGNIFICANT CHANGE UP (ref 70–99)
GLUCOSE BLDC GLUCOMTR-MCNC: 99 MG/DL — SIGNIFICANT CHANGE UP (ref 70–99)
GLUCOSE SERPL-MCNC: 109 MG/DL — HIGH (ref 70–99)
HCT VFR BLD CALC: 37.6 % — SIGNIFICANT CHANGE UP (ref 34.5–45)
HGB BLD-MCNC: 11.4 G/DL — LOW (ref 11.5–15.5)
MAGNESIUM SERPL-MCNC: 2.3 MG/DL — SIGNIFICANT CHANGE UP (ref 1.6–2.6)
MCHC RBC-ENTMCNC: 23.5 PG — LOW (ref 27–34)
MCHC RBC-ENTMCNC: 30.3 GM/DL — LOW (ref 32–36)
MCV RBC AUTO: 77.4 FL — LOW (ref 80–100)
NRBC # BLD: 2 /100 WBCS — HIGH (ref 0–0)
PHOSPHATE SERPL-MCNC: 3.3 MG/DL — SIGNIFICANT CHANGE UP (ref 2.5–4.5)
PLATELET # BLD AUTO: 439 K/UL — HIGH (ref 150–400)
POTASSIUM SERPL-MCNC: 4.8 MMOL/L — SIGNIFICANT CHANGE UP (ref 3.5–5.3)
POTASSIUM SERPL-SCNC: 4.8 MMOL/L — SIGNIFICANT CHANGE UP (ref 3.5–5.3)
PROT SERPL-MCNC: 7.3 G/DL — SIGNIFICANT CHANGE UP (ref 6–8.3)
RBC # BLD: 4.86 M/UL — SIGNIFICANT CHANGE UP (ref 3.8–5.2)
RBC # FLD: SIGNIFICANT CHANGE UP (ref 10.3–14.5)
SODIUM SERPL-SCNC: 137 MMOL/L — SIGNIFICANT CHANGE UP (ref 135–145)
SPECIMEN SOURCE: SIGNIFICANT CHANGE UP
SPECIMEN SOURCE: SIGNIFICANT CHANGE UP
WBC # BLD: 16.85 K/UL — HIGH (ref 3.8–10.5)
WBC # FLD AUTO: 16.85 K/UL — HIGH (ref 3.8–10.5)

## 2022-09-21 PROCEDURE — 99233 SBSQ HOSP IP/OBS HIGH 50: CPT

## 2022-09-21 PROCEDURE — 99291 CRITICAL CARE FIRST HOUR: CPT | Mod: 24

## 2022-09-21 PROCEDURE — 99232 SBSQ HOSP IP/OBS MODERATE 35: CPT

## 2022-09-21 PROCEDURE — 71045 X-RAY EXAM CHEST 1 VIEW: CPT | Mod: 26

## 2022-09-21 RX ORDER — FUROSEMIDE 40 MG
40 TABLET ORAL DAILY
Refills: 0 | Status: DISCONTINUED | OUTPATIENT
Start: 2022-09-22 | End: 2022-09-27

## 2022-09-21 RX ORDER — HUMAN INSULIN 100 [IU]/ML
40 INJECTION, SUSPENSION SUBCUTANEOUS
Refills: 0 | Status: DISCONTINUED | OUTPATIENT
Start: 2022-09-21 | End: 2022-09-22

## 2022-09-21 RX ORDER — HUMAN INSULIN 100 [IU]/ML
24 INJECTION, SUSPENSION SUBCUTANEOUS ONCE
Refills: 0 | Status: COMPLETED | OUTPATIENT
Start: 2022-09-21 | End: 2022-09-21

## 2022-09-21 RX ORDER — HUMAN INSULIN 100 [IU]/ML
24 INJECTION, SUSPENSION SUBCUTANEOUS EVERY 6 HOURS
Refills: 0 | Status: DISCONTINUED | OUTPATIENT
Start: 2022-09-21 | End: 2022-09-21

## 2022-09-21 RX ORDER — HUMAN INSULIN 100 [IU]/ML
12 INJECTION, SUSPENSION SUBCUTANEOUS ONCE
Refills: 0 | Status: COMPLETED | OUTPATIENT
Start: 2022-09-21 | End: 2022-09-21

## 2022-09-21 RX ORDER — HUMAN INSULIN 100 [IU]/ML
20 INJECTION, SUSPENSION SUBCUTANEOUS
Refills: 0 | Status: DISCONTINUED | OUTPATIENT
Start: 2022-09-21 | End: 2022-09-22

## 2022-09-21 RX ADMIN — MEXILETINE HYDROCHLORIDE 200 MILLIGRAM(S): 150 CAPSULE ORAL at 23:09

## 2022-09-21 RX ADMIN — HUMAN INSULIN 24 UNIT(S): 100 INJECTION, SUSPENSION SUBCUTANEOUS at 00:29

## 2022-09-21 RX ADMIN — HUMAN INSULIN 40 UNIT(S): 100 INJECTION, SUSPENSION SUBCUTANEOUS at 18:05

## 2022-09-21 RX ADMIN — Medication 500000 UNIT(S): at 17:46

## 2022-09-21 RX ADMIN — Medication 25 MILLIGRAM(S): at 23:08

## 2022-09-21 RX ADMIN — Medication 1 APPLICATION(S): at 00:01

## 2022-09-21 RX ADMIN — Medication 3 MILLILITER(S): at 18:11

## 2022-09-21 RX ADMIN — Medication 0.5 MILLIGRAM(S): at 18:13

## 2022-09-21 RX ADMIN — Medication 25 MILLIGRAM(S): at 05:30

## 2022-09-21 RX ADMIN — APIXABAN 5 MILLIGRAM(S): 2.5 TABLET, FILM COATED ORAL at 23:09

## 2022-09-21 RX ADMIN — Medication 25 MILLIGRAM(S): at 14:42

## 2022-09-21 RX ADMIN — Medication 8 MILLIGRAM(S): at 05:26

## 2022-09-21 RX ADMIN — HUMAN INSULIN 12 UNIT(S): 100 INJECTION, SUSPENSION SUBCUTANEOUS at 06:46

## 2022-09-21 RX ADMIN — Medication 8: at 18:05

## 2022-09-21 RX ADMIN — Medication 3 MILLILITER(S): at 11:11

## 2022-09-21 RX ADMIN — Medication 500000 UNIT(S): at 23:08

## 2022-09-21 RX ADMIN — Medication 3 MILLILITER(S): at 05:37

## 2022-09-21 RX ADMIN — Medication 1 TABLET(S): at 11:06

## 2022-09-21 RX ADMIN — Medication 20 MILLIGRAM(S): at 05:26

## 2022-09-21 RX ADMIN — CHLORHEXIDINE GLUCONATE 1 APPLICATION(S): 213 SOLUTION TOPICAL at 22:11

## 2022-09-21 RX ADMIN — MEXILETINE HYDROCHLORIDE 200 MILLIGRAM(S): 150 CAPSULE ORAL at 05:26

## 2022-09-21 RX ADMIN — SPIRONOLACTONE 25 MILLIGRAM(S): 25 TABLET, FILM COATED ORAL at 17:46

## 2022-09-21 RX ADMIN — Medication 3 MILLILITER(S): at 23:38

## 2022-09-21 RX ADMIN — SPIRONOLACTONE 25 MILLIGRAM(S): 25 TABLET, FILM COATED ORAL at 05:27

## 2022-09-21 RX ADMIN — HUMAN INSULIN 24 UNIT(S): 100 INJECTION, SUSPENSION SUBCUTANEOUS at 12:58

## 2022-09-21 RX ADMIN — Medication 0.5 MILLIGRAM(S): at 05:37

## 2022-09-21 RX ADMIN — APIXABAN 5 MILLIGRAM(S): 2.5 TABLET, FILM COATED ORAL at 00:10

## 2022-09-21 RX ADMIN — PANTOPRAZOLE SODIUM 40 MILLIGRAM(S): 20 TABLET, DELAYED RELEASE ORAL at 11:06

## 2022-09-21 RX ADMIN — QUETIAPINE FUMARATE 25 MILLIGRAM(S): 200 TABLET, FILM COATED ORAL at 23:35

## 2022-09-21 RX ADMIN — MONTELUKAST 10 MILLIGRAM(S): 4 TABLET, CHEWABLE ORAL at 23:09

## 2022-09-21 RX ADMIN — APIXABAN 5 MILLIGRAM(S): 2.5 TABLET, FILM COATED ORAL at 12:58

## 2022-09-21 RX ADMIN — Medication 500000 UNIT(S): at 00:10

## 2022-09-21 RX ADMIN — HUMAN INSULIN 20 UNIT(S): 100 INJECTION, SUSPENSION SUBCUTANEOUS at 23:17

## 2022-09-21 RX ADMIN — Medication 500000 UNIT(S): at 11:06

## 2022-09-21 RX ADMIN — Medication 8: at 12:59

## 2022-09-21 RX ADMIN — Medication 500000 UNIT(S): at 05:26

## 2022-09-21 RX ADMIN — MEXILETINE HYDROCHLORIDE 200 MILLIGRAM(S): 150 CAPSULE ORAL at 14:41

## 2022-09-21 RX ADMIN — CHLORHEXIDINE GLUCONATE 1 APPLICATION(S): 213 SOLUTION TOPICAL at 05:30

## 2022-09-21 NOTE — PROGRESS NOTE ADULT - SUBJECTIVE AND OBJECTIVE BOX
DIABETES FOLLOW UP NOTE: Saw pt earlier today    Chief Complaint: Endocrine consult requested for management of T2DM    INTERVAL HX: Pt stable, states feeling well but reports she doesn't feel hungry> per team pt is not eating much. Starting calorie count. Remains on TFs of Glucerna 60cc/hr with BG greatly improved overnight. Noted  at 12mn so team administered 24 units of NPH instead of 48units with BG this am <100s (80-90)> received NPH 12 units with rebound hyperglycemia in 300s at 12noon> On Prednisone 8mg daily.     Review of Systems:  General: As above  Cardiovascular: No chest pain, palpitations  Respiratory: No SOB, no cough  GI: No nausea, vomiting, abdominal pain  Endocrine: No polyuria, polydipsia or S&Sx of hypoglycemia    Allergies    aspirin (Short breath)  Avelox (Short breath; Pruritus)  cefepime (Anaphylaxis)  codeine (Short breath)  Dilaudid (Short breath)  iodine (Short breath; Swelling)  penicillin (Anaphylaxis)  shellfish (Anaphylaxis)  tetanus toxoid (Short breath)  Valium (Short breath)    Intolerances      MEDICATIONS:  insulin lispro (ADMELOG) corrective regimen sliding scale   SubCutaneous every 6 hours  insulin NPH human recombinant 24 Unit(s) SubCutaneous every 6 hours  predniSONE   Tablet 8 milliGRAM(s) Oral daily      PHYSICAL EXAM:  VITALS: T(C): 36 (09-21-22 @ 11:00)  T(F): 96.8 (09-21-22 @ 11:00), Max: 98.4 (09-21-22 @ 00:00)  HR: 75 (09-21-22 @ 14:00) (67 - 112)  BP: 143/63 (09-21-22 @ 13:00) (97/70 - 143/63)  RR:  (14 - 34)  SpO2:  (95% - 100%)  Wt(kg): --  GENERAL: Female sitting in chair in NAD  HEENT: NGT in place with TFs at 60cc/hr at time of visit  CHEST: Surgical site with dressing D&I  Abdomen: Soft, nontender, non distended  Extremities: Warm, no edema in all 4 exts  NEURO: Alert answering questions but appears somewhat confused.    LABS:  POCT Blood Glucose.: 344 mg/dL (09-21-22 @ 12:54)  POCT Blood Glucose.: 89 mg/dL (09-21-22 @ 06:45)  POCT Blood Glucose.: 99 mg/dL (09-21-22 @ 05:13)  POCT Blood Glucose.: 108 mg/dL (09-21-22 @ 02:36)  POCT Blood Glucose.: 119 mg/dL (09-20-22 @ 23:55)  POCT Blood Glucose.: 288 mg/dL (09-20-22 @ 17:33)  POCT Blood Glucose.: 314 mg/dL (09-20-22 @ 12:10)  POCT Blood Glucose.: 222 mg/dL (09-20-22 @ 09:56)  POCT Blood Glucose.: 153 mg/dL (09-20-22 @ 09:03)  POCT Blood Glucose.: 165 mg/dL (09-20-22 @ 08:07)  POCT Blood Glucose.: 282 mg/dL (09-20-22 @ 06:56)  POCT Blood Glucose.: 255 mg/dL (09-20-22 @ 05:35)  POCT Blood Glucose.: 201 mg/dL (09-20-22 @ 00:00)  POCT Blood Glucose.: 302 mg/dL (09-19-22 @ 17:34)  POCT Blood Glucose.: 120 mg/dL (09-19-22 @ 12:24)  POCT Blood Glucose.: 116 mg/dL (09-19-22 @ 10:57)  POCT Blood Glucose.: 195 mg/dL (09-19-22 @ 10:07)  POCT Blood Glucose.: 176 mg/dL (09-19-22 @ 09:08)  POCT Blood Glucose.: 167 mg/dL (09-19-22 @ 08:06)  POCT Blood Glucose.: 148 mg/dL (09-19-22 @ 06:55)  POCT Blood Glucose.: 193 mg/dL (09-19-22 @ 05:58)  POCT Blood Glucose.: 187 mg/dL (09-19-22 @ 05:28)  POCT Blood Glucose.: 230 mg/dL (09-19-22 @ 03:58)  POCT Blood Glucose.: 220 mg/dL (09-19-22 @ 02:56)  POCT Blood Glucose.: 127 mg/dL (09-19-22 @ 01:01)  POCT Blood Glucose.: 107 mg/dL (09-19-22 @ 00:35)  POCT Blood Glucose.: 99 mg/dL (09-19-22 @ 00:07)                          11.4   16.85 )-----------( 439      ( 21 Sep 2022 01:33 )             37.6       09-21    137  |  99  |  33<H>  ----------------------------<  109<H>  4.8   |  25  |  0.72    eGFR: 88    Ca    9.4      09-21  Mg     2.3     09-21  Phos  3.3     09-21    TPro  7.3  /  Alb  3.8  /  TBili  0.2  /  DBili  x   /  AST  27  /  ALT  65<H>  /  AlkPhos  124<H>  09-21    Thyroid Function Tests:  09-06 @ 16:46 TSH 3.30 FreeT4 -- T3 -- Anti TPO -- Anti Thyroglobulin Ab -- TSI --      A1C with Estimated Average Glucose Result: 9.4 % (09-06-22 @ 16:46)  A1C with Estimated Average Glucose Result: 9.2 % (07-11-22 @ 07:36)      Estimated Average Glucose: 223 mg/dL (09-06-22 @ 16:46)  Estimated Average Glucose: 217 mg/dL (07-11-22 @ 07:36)

## 2022-09-21 NOTE — PROGRESS NOTE ADULT - TIME BILLING
as above: passed FEESST study but NGT in place, remains in PICU  multifactorial dyspnea-resp failure--severe persistent asthma, TBM s/p tracheoplasty, s/p Aortic aneurysm repair, Bronchitis (proteus)-O2 NC-keep 90%  severe persistent asthma--medrol 8mg, singulair 10, duoneb q 6, budes .5 bid, incentive spirometry, tezspire 8/29-next 9/29  TBM-s/p tracheoplasty--accapella, unable to use vest due to surgery  s/p Aortic aneurysm repair--as per [facility] staff care  AF-on heparin--eventual eliquis rx  DVT R-IJ-on heparin rx  ID-proteus bronchitisi-meropenum changed to ceftriaxone and back to meropenem/vanco-now off of ABX as of 9/19  PT-OOB as able           VC dysfunction--aspiration precautions-sp and sw evaln--NGT in place/TF; FEESST passed 9/20  GOC                                    Heme onc f/up colon ca  prog--guarded in CTU/PICU                PT--OOB as much as possible and all neb rx is to be HHN and not FM (d/w nursing)    Eulalio Allison MD-Pulmonary   [phone]

## 2022-09-21 NOTE — PROGRESS NOTE ADULT - SUBJECTIVE AND OBJECTIVE BOX
CHIEF COMPLAINT: f/up sob, chronic resp failure, TBM, severe persistent asthma, VC dysfunction, Type A aortic dissection s/p repair w/modified "Bentall procedure and hemiarch replacement 9/6-comfortable/fatigued but not sob-some mucus and cough    Interval Events: passed swallow testing    REVIEW OF SYSTEMS:  Constitutional: No fevers or chills. No weight loss. + fatigue or generalized malaise.  Eyes: No itching or discharge from the eyes  ENT: No ear pain. No ear discharge. No nasal congestion. No post nasal drip. No epistaxis. No throat pain. No sore throat. No difficulty swallowing.   CV: No chest pain. No palpitations. No lightheadedness or dizziness.   Resp: No dyspnea at rest. + dyspnea on exertion. No orthopnea. No wheezing. No cough. No stridor. No sputum production. No chest pain with respiration.  GI: No nausea. No vomiting. No diarrhea.  MSK: No joint pain or pain in any extremities  Integumentary: No skin lesions. No pedal edema.  Neurological: + gross motor weakness. No sensory changes.  [+ ] All other systems negative  [ ] Unable to assess ROS because ________    OBJECTIVE:  ICU Vital Signs Last 24 Hrs  T(C): 36.4 (21 Sep 2022 04:00), Max: 36.9 (21 Sep 2022 00:00)  T(F): 97.5 (21 Sep 2022 04:00), Max: 98.4 (21 Sep 2022 00:00)  HR: 96 (21 Sep 2022 04:00) (63 - 112)  BP: 97/70 (21 Sep 2022 04:00) (97/70 - 142/79)  BP(mean): 79 (21 Sep 2022 04:00) (72 - 101)  ABP: 106/55 (21 Sep 2022 04:00) (99/61 - 138/76)  ABP(mean): 70 (21 Sep 2022 04:00) (66 - 95)  RR: 27 (21 Sep 2022 04:00) (14 - 34)  SpO2: 100% (21 Sep 2022 04:00) (95% - 100%)    O2 Parameters below as of 21 Sep 2022 04:00  Patient On (Oxygen Delivery Method): room air              09-19 @ 07:01 - 09-20 @ 07:00  --------------------------------------------------------  IN: 1779 mL / OUT: 2805 mL / NET: -1026 mL    09-20 @ 07:01 - 09-21 @ 04:53  --------------------------------------------------------  IN: 1424 mL / OUT: 1230 mL / NET: 194 mL      CAPILLARY BLOOD GLUCOSE  222 (20 Sep 2022 10:00)      POCT Blood Glucose.: 108 mg/dL (21 Sep 2022 02:36)      PHYSICAL EXAM: NAD in bed on NC O2/NGT in place  General: Awake, alert, oriented X 3.   HEENT: Atraumatic, normocephalic.                 Mallampatti Grade 2                No nasal congestion.                No tonsillar or pharyngeal exudates.  Lymph Nodes: No palpable lymphadenopathy  Neck: No JVD. No carotid bruit.   Respiratory: abnormal chest expansion                         Normal percussion                         Normal and equal air entry                         No wheeze, rhonchi or rales.  Cardiovascular: S1 S2 normal. No murmurs, rubs or gallops.   Abdomen: Soft, non-tender, non-distended. No organomegaly. Normoactive bowel sounds.  Extremities: Warm to touch. Peripheral pulse palpable. No pedal edema.   Skin: No rashes or skin lesions  Neurological: Motor and sensory examination equal and normal in all four extremities.  Psychiatry: Appropriate mood and affect.    HOSPITAL MEDICATIONS:  MEDICATIONS  (STANDING):  albuterol/ipratropium for Nebulization 3 milliLiter(s) Nebulizer every 6 hours  apixaban 5 milliGRAM(s) Oral every 12 hours  buDESOnide    Inhalation Suspension 0.5 milliGRAM(s) Inhalation every 12 hours  chlorhexidine 2% Cloths 1 Application(s) Topical <User Schedule>  collagenase Ointment 1 Application(s) Topical daily  dextrose 50% Injectable 50 milliLiter(s) IV Push every 15 minutes  furosemide    Tablet 20 milliGRAM(s) Oral daily  insulin lispro (ADMELOG) corrective regimen sliding scale   SubCutaneous every 6 hours  insulin NPH human recombinant 24 Unit(s) SubCutaneous every 6 hours  insulin regular Infusion 4 Unit(s)/Hr (4 mL/Hr) IV Continuous <Continuous>  metoprolol tartrate 25 milliGRAM(s) Oral every 8 hours  mexiletine 200 milliGRAM(s) Oral every 8 hours  montelukast 10 milliGRAM(s) Oral at bedtime  multivitamin 1 Tablet(s) Oral daily  nystatin    Suspension 451920 Unit(s) Oral every 6 hours  pantoprazole  Injectable 40 milliGRAM(s) IV Push daily  predniSONE   Tablet 8 milliGRAM(s) Oral daily  QUEtiapine 25 milliGRAM(s) Oral at bedtime  spironolactone 25 milliGRAM(s) Oral every 12 hours    MEDICATIONS  (PRN):  acetaminophen    Suspension .. 675 milliGRAM(s) Oral every 6 hours PRN Moderate Pain (4 - 6)  albuterol/ipratropium for Nebulization 3 milliLiter(s) Nebulizer every 12 hours PRN Shortness of Breath and/or Wheezing      LABS:                        11.4   16.85 )-----------( 439      ( 21 Sep 2022 01:33 )             37.6     09-21    137  |  99  |  33<H>  ----------------------------<  109<H>  4.8   |  25  |  0.72    Ca    9.4      21 Sep 2022 01:33  Phos  3.3     09-21  Mg     2.3     09-21    TPro  7.3  /  Alb  3.8  /  TBili  0.2  /  DBili  x   /  AST  27  /  ALT  65<H>  /  AlkPhos  124<H>  09-21        Arterial Blood Gas:  09-21 @ 01:30  7.47/38/86/28/96.9/3.8  ABG lactate: --  Arterial Blood Gas:  09-19 @ 23:56  7.49/34/90/26/97.2/2.7  ABG lactate: --        MICROBIOLOGY:     RADIOLOGY:  [ ] Reviewed and interpreted by me    Point of Care Ultrasound Findings:    PFT:    EKG:

## 2022-09-21 NOTE — PROGRESS NOTE ADULT - ASSESSMENT
71F w/h/o uncontrolled T2DM (A1C 9.4%) on basal/bolus insulin PTA. Unknown DM complications. Also COPD, secondary adrenal Insufficiency on chronic steroids, colorectal cancer s/p resection (colostomy bag), Chronic A fib on Eliquis, and tracheomalacia and multiple intubations, recent dx of OM presented to ED at [facility] with epigastric pain, belching and central chest pain. Found on CTA to have type A aortic dissection and transferred to [facility] for surgical evaluation by Dr. Cabrera. Now s/p aortic dissection repair on 9/07/22. Endocrine consulted for assistance with transition from insulin drip to subcutaneous insulin while on TFs. Pt hyperglycemic yesterday but with BG levels improved overnight (low 100s) coming down to <100s this am without hypoglycemia. Received 50% NPH dose at mn and 25% dose this am with rebound hyperglycemia at 12noon. Per staff, pt not eating much. Pt reports she is not hungry. Spoke to primary team and gave recs to decrease TF rate to see if pt's appetite improves. Once pt starts POs can switch to overnight TFs instead. Increased NPH dose for lunch. Will continue to adjust insulin doses to BG goal 100 to 180s.     home medications: Lantus 35 units sq qhs, novolog TID (based on carb count usually around 10 units)

## 2022-09-21 NOTE — PROGRESS NOTE ADULT - PROBLEM SELECTOR PLAN 1
- FS q6h while on TFs. Once off TFs ac and hs and 2am.   - Increased NPH to 24 units q6h while on lower TFs rate of 40cc/hr  -Will add premeal insulin once pt eating consistently  -Start calorie count  -Once off TFs can consider Lantus 30 units (home dose 35) plus Admelog 10 (home dose) Make sure pt eats.   - Hold NPH if TFs are off/held  - Please contact endo team with any changes on TF/PO/Steroid status  Discharge plan:  - Likely to discharge patient home on basal/bolus insulin. Final regimen pending clinical course.  - Recommend routine outpatient ophthalmology, podiatry  - Can f/u with endocrinologist Dr. Saavedra.  - Make sure pt has Rx for all DM supplies and insulin/ DM meds.  -Based on pt's clinical condition and mental status at this time she is not able to independently manage her DM. Will evaluate pt's ability to manage DM at time of discharge. If unable> pt will need family/ care giver (s) to manage DM care at home  -Might need rehab.

## 2022-09-21 NOTE — CHART NOTE - NSCHARTNOTEFT_GEN_A_CORE
Nutrition Follow Up Note  Patient seen for: calorie count consult     Chart reviewed, events noted. Pt is a 71F PMH DM, COPD, Chronic Adrenal Insufficiency on Chronic prednisone, history of colorectal cancer s/p resection (colostomy bag), Hx of CAD, Chronic A fib on Eliquis, and tracheomalacia and multiple intubations, recent dx of OM presented to ED at [facility] this morning with epigastric pain, belching and central chest pain. Found on CTA to have type A aortic dissection s/p modified bentall and hemiarch on .    Source: [x] Patient       [x] EMR        [x] RN        [] Family at bedside       [x] Other: team    -If unable to interview patient: [] Trach/Vent/BiPAP  [] Disoriented/confused/inappropriate to interview    Current Diet: Diet, Consistent Carbohydrate/No Snacks:   DASH/TLC {Sodium & Cholesterol Restricted} (DASH)  Tube Feeding Modality: Nasogastric  Glucerna 1.5 Chago (GLUCERNA1.5RTH)  Total Volume for 24 Hours (mL): 1440  Continuous  Starting Tube Feed Rate {mL per Hour}: 60  Until Goal Tube Feed Rate (mL per Hour): 60  Tube Feed Duration (in Hours): 24  Tube Feed Start Time: 13:00  Supplement Feeding Modality:  Oral  Glucerna Shake Cans or Servings Per Day:  3       Frequency:  Daily (22 @ 13:00)    EN Order Provides: 1440 ml total volume, 2160 kcal, 119 gm protein and 1093 ml free water. Feeds at goal would meet 32 kcal/kg and 1.75 g/kg protein based on dosing wt of 67.7 kg.  Current Pump Rate: 60ml/hr  EN provision per RN flow sheets:  - : 1440mL  - : 1440mL  - : 1440mL    Nutrition-related concerns:  - Pt s/p FEES  recommended for regular diet. Remains on 24hr tube feeding as pt with limited PO intake - calorie count in progress. Pt very lethargic at visit, only nodding head yes or no to RD. Indicates she has no appetite and has not eaten so far today.  - HbA1c 9.4%. NPH + sliding scale insulin for BG management (insulin gtt off). Pt also receiving Prednisone.  - Multivitamin ordered    GI: +colostomy, 75mL output noted .  Bowel Regimen? [] Yes   [x] No    Weights:   Daily Weight in k.3 (-), Weight in k.9 (-), Weight in k.9 (), Weight in k.3 (-18), Weight in k.1 (17), Weight in k.3 (-16), Weight in k.3 (15), Weight in k.4 (), Weight in k.7 ()   - Weight fluctuations noted, likely 2/2 fluid shifts - pt on Lasix + Spironolactone, will continue to monitor    - Pt reports UBW 137lbs or 62.3kg. Denies any recent wt changes PTA, endorses weight gain since admission 2/2 fluid.    MEDICATIONS  (STANDING):  albuterol/ipratropium for Nebulization 3 milliLiter(s) Nebulizer every 6 hours  apixaban 5 milliGRAM(s) Oral every 12 hours  buDESOnide    Inhalation Suspension 0.5 milliGRAM(s) Inhalation every 12 hours  chlorhexidine 2% Cloths 1 Application(s) Topical <User Schedule>  collagenase Ointment 1 Application(s) Topical daily  dextrose 50% Injectable 50 milliLiter(s) IV Push every 15 minutes  furosemide    Tablet 20 milliGRAM(s) Oral daily  insulin lispro (ADMELOG) corrective regimen sliding scale   SubCutaneous every 6 hours  insulin NPH human recombinant 24 Unit(s) SubCutaneous every 6 hours  insulin regular Infusion 4 Unit(s)/Hr (4 mL/Hr) IV Continuous <Continuous>  metoprolol tartrate 25 milliGRAM(s) Oral every 8 hours  mexiletine 200 milliGRAM(s) Oral every 8 hours  montelukast 10 milliGRAM(s) Oral at bedtime  multivitamin 1 Tablet(s) Oral daily  nystatin    Suspension 844129 Unit(s) Oral every 6 hours  pantoprazole  Injectable 40 milliGRAM(s) IV Push daily  predniSONE   Tablet 8 milliGRAM(s) Oral daily  QUEtiapine 25 milliGRAM(s) Oral at bedtime  spironolactone 25 milliGRAM(s) Oral every 12 hours    MEDICATIONS  (PRN):  acetaminophen    Suspension .. 675 milliGRAM(s) Oral every 6 hours PRN Moderate Pain (4 - 6)  albuterol/ipratropium for Nebulization 3 milliLiter(s) Nebulizer every 12 hours PRN Shortness of Breath and/or Wheezing    Pertinent Labs:  @ 01:33: Na 137, BUN 33<H>, Cr 0.72, <H>, K+ 4.8, Phos 3.3, Mg 2.3, Alk Phos 124<H>, ALT/SGPT 65<H>, AST/SGOT 27, HbA1c --    A1C with Estimated Average Glucose Result: 9.4 % (22 @ 16:46)  A1C with Estimated Average Glucose Result: 9.2 % (22 @ 07:36)  A1C with Estimated Average Glucose Result: 8.0 % (05-10-22 @ 12:26)  A1C with Estimated Average Glucose Result: 9.5 % (22 @ 13:50)    Finger Sticks:   POCT Blood Glucose.: 89 mg/dL (21 Sep 2022 06:45)  POCT Blood Glucose.: 99 mg/dL (21 Sep 2022 05:13)  POCT Blood Glucose.: 108 mg/dL (21 Sep 2022 02:36)  POCT Blood Glucose.: 119 mg/dL (20 Sep 2022 23:55)  POCT Blood Glucose.: 288 mg/dL (20 Sep 2022 17:33)  POCT Blood Glucose.: 314 mg/dL (20 Sep 2022 12:10)  POCT Blood Glucose.: 222 mg/dL (20 Sep 2022 09:56)  POCT Blood Glucose.: 153 mg/dL (20 Sep 2022 09:03)    Skin per nursing documentation: No noted pressure injuries as per documentation; pt with midline sternal incision  Edema: No noted edema as per flow sheets.     Nutritional needs: based on dosing wt 67.7kg   Estimated Energy Needs: 1897 - 2234kcal (28 - 33kcal/kg)  Estimated Protein Needs: 95 - 122g (1.4 - 1.8g/kg)  Estimated Fluid Needs: per team.    Previous Nutrition Diagnosis: Inadequate Protein Energy Intake  Nutrition Diagnosis is: [x] ongoing  [] resolved [] not applicable     Previous Nutrition Diagnosis: Increased Nutrient Needs  Nutrition Diagnosis is: [x] ongoing  [] resolved [] not applicable     New Nutrition Diagnosis: [x] Not applicable    Nutrition Care Plan:  [x] In Progress - being addressed with EN at goal, micronutrient supplementation  [] Achieved  [] Not applicable    Nutrition Interventions:     Education Provided:       [x] No: Not appropriate at this time    Recommendations:  1. Continue consistent carbohydrate diet as ordered, recommend discontinue DASH/TLC and add low sodium.   - Continue Glucerna Shake 240mls 3x daily (660kcals, 30g protein).  2. Given reports of decreased appetite, consider change in EN regimen to nocturnal feeds of Glucerna 1.5 with goal rate of 85mL/hr x 12hr (8PM - 8AM) to provide 1020mL total volume, 1530kcal, 84g protein, 774mL free water. Regimen meets 80% lower end estimated energy needs, 88% lower end estimated protein needs.   3. Continue with multivitamin supplementation unless otherwise contraindicated.    4. Consider appetite stimulant if not otherwise contraindicated.  5. Monitor colostomy output, consider bowel regimen PRN.     Calorie count started - RD to assess results of chago count upon completion. If PO intake does not improve on nocturnal feeds, may resume 24hr feeding regimen of Glucerna 1.5 at 60mL/hr x 24hr.    Monitoring and Evaluation:   Continue to monitor nutritional intake, tolerance to diet prescription, weights, labs, skin integrity    RD remains available upon request and will follow up per protocol    Deedee Rosas MS, RD, CDN, [facility] #[phone]

## 2022-09-21 NOTE — PROGRESS NOTE ADULT - SUBJECTIVE AND OBJECTIVE BOX
Patient seen and examined at the bedside.    Remained critically ill on continuous ICU monitoring.    OBJECTIVE:  Vital Signs Last 24 Hrs  T(C): 35.7 (21 Sep 2022 07:00), Max: 36.9 (21 Sep 2022 00:00)  T(F): 96.2 (21 Sep 2022 07:00), Max: 98.4 (21 Sep 2022 00:00)  HR: 91 (21 Sep 2022 07:00) (63 - 112)  BP: 110/60 (21 Sep 2022 07:00) (97/70 - 142/79)  BP(mean): 75 (21 Sep 2022 07:00) (72 - 101)  RR: 22 (21 Sep 2022 07:00) (14 - 34)  SpO2: 99% (21 Sep 2022 07:00) (95% - 100%)    Parameters below as of 21 Sep 2022 07:00  Patient On (Oxygen Delivery Method): room air        Physical Exam:   General: obese, elderly female, OOBTC, NAD  Neurology: arousable, following commands   Eyes: right pupil reactive to light, left surgical pupil  ENT/Neck: Neck supple, trachea midline, No JVD  Respiratory: Clear bilaterally   CV: S1S2, no murmurs        [x] Sternal dressing        [x] NSR  Abdominal: Soft, NT, ND, colostomy in place  Extremities: trace pedal edema noted, + peripheral pulses   Skin: Dry dressing over right heel, no Rashes, Hematoma, Ecchymosis    Assessment:  71F PMH DM, COPD, Chronic Adrenal Insufficiency on Chronic prednisone, history of colorectal cancer s/p resection (colostomy bag), Hx of CAD, Chronic A fib on Eliquis, and tracheomalacia and multiple intubations, recent dx of OM presented to ED at [facility] this morning with epigastric pain, belching and central chest pain. Found on CTA to have type A aortic dissection and transferred to [facility] for surgical evaluation by Dr. Cabrera.     Ascending aortic dissection s/p modified bentall and hemiarch on 9/6   Hypovolemic shock   Post op respiratory insufficiency  Acute blood loss anemia  Stress hyperglycemia   Lactic Acidosis  RIJ thrombus  Pancreatic cyst    Proteus PNA  Leukocytosis        Plan:   ***Neuro***  [x] Nonfocal   Post operative neuro assessment   Continue Seroquel, improvement in mental status    ***Cardiovascular***  CT Chest on 9/12:  Status post recent ascending aortic dissection repair. Small amount of fluid surrounding the ascending aorta without evidence of enhancing wall to suggest abscess.  RUE duplex on 9/12: There is a thrombosed and occluded RIJ   Invasive hemodynamic monitoring, assess perfusion indices   SR /MAP 81/Hct 37.6%/ Lactate 2.1  Continue lopressor for afib prophylaxis and rate control  Continue Mexiletine    [x] AC therapy with Eliquis         ***Pulmonary***  CT Chest on 9/12: Bilateral lower lobe atelectasis with bilateral pleural effusions   Reintubated for change in mental status on 9/8, self extubated on 9/9 and reintubated again, extubated to HFO2 on 9/13, now sating on room air in 90s  Encourage incentive spirometry, continue pulse ox monitoring, follow ABGs   Hx of COPD / Continue bronchodilators and Prednisone   Monitor secretions and suction PRN               ***GI***  CT A/P on 9/12: Mild thickening of the cecum and ascending colon possibly representing mild nonspecific proximal colitis. Hepatic cirrhosis. The focal IPMN of the pancreas with large cyst within the tail the pancreas measuring 3.1 cm.  Colostomy bag in place, continue to monitor output   [x] Diet: TFs Glucerna at goal 60 cc/hr  [x] Protonix   Passed FEES yesterday, CC diet with reg liquids, will see how patients appetite is prior to shutting off tubes feeds      ***Renal***  Continue to monitor I/Os, BUN/Creatinine.   Replete lytes PRN  Amezcua present  Continue diuresis with Lasix and Aldactone      ***ID***  SCx on 9/8 +Proteus mirabilis   UA on 9/9+ LE, WBC 60, few yeast  /  UCx on 9/10 NG   BCx on 9/10 NGTD, BCx on 9/12 NGTD 9/16 bcx NGTD  Nystatin for thrush   Diflucan added on 9/19 for Yeast found in Urine on 9/16  Antibiotics discontinued due to lack of source per ID rec. Leukocytosis improving. Afebrile. Will monitor off antibiotics.        ***Endocrine***  [x]  DM : HbA1c 9.4%                - [x] Insulin gtt [x] ISS [x] NPH             - Need tight glycemic control to prevent wound infection.      Patient requires continuous monitoring with bedside rhythm monitoring, pulse oximetry monitoring, and continuous central venous and arterial pressure monitoring; and intermittent blood gas analysis. Care plan discussed with the ICU care team.   Patient remained critical, at risk for life threatening decompensation.    I have spent 30 minutes providing critical care management to this patient.    By signing my name below, I, Megan Guerra, attest that this documentation has been prepared under the direction and in the presence of Radha Nichols NP   Electronically signed: Georgi Luna, 09-21-22 @ 08:27    I, Radha Nichols NP , personally performed the services described in this documentation. all medical record entries made by the scribe were at my direction and in my presence. I have reviewed the chart and agree that the record reflects my personal performance and is accurate and complete  Electronically signed: Radha Nichols NP  Patient seen and examined at the bedside.    Remained critically ill on continuous ICU monitoring.    OBJECTIVE:  Vital Signs Last 24 Hrs  T(C): 35.7 (21 Sep 2022 07:00), Max: 36.9 (21 Sep 2022 00:00)  T(F): 96.2 (21 Sep 2022 07:00), Max: 98.4 (21 Sep 2022 00:00)  HR: 91 (21 Sep 2022 07:00) (63 - 112)  BP: 110/60 (21 Sep 2022 07:00) (97/70 - 142/79)  BP(mean): 75 (21 Sep 2022 07:00) (72 - 101)  RR: 22 (21 Sep 2022 07:00) (14 - 34)  SpO2: 99% (21 Sep 2022 07:00) (95% - 100%)    Parameters below as of 21 Sep 2022 07:00  Patient On (Oxygen Delivery Method): room air        Physical Exam:   General: obese, elderly female, OOBTC, NAD  Neurology: arousable, following commands   Eyes: right pupil reactive to light, left surgical pupil  ENT/Neck: Neck supple, trachea midline, No JVD  Respiratory: Clear bilaterally   CV: S1S2, no murmurs        [x] Sternal dressing        [x] NSR  Abdominal: Soft, NT, ND, colostomy in place  Extremities: trace pedal edema noted, + peripheral pulses   Skin: Dry dressing over right heel, no Rashes, Hematoma, Ecchymosis    Assessment:  71F PMH DM, COPD, Chronic Adrenal Insufficiency on Chronic prednisone, history of colorectal cancer s/p resection (colostomy bag), Hx of CAD, Chronic A fib on Eliquis, and tracheomalacia and multiple intubations, recent dx of OM presented to ED at [facility] this morning with epigastric pain, belching and central chest pain. Found on CTA to have type A aortic dissection and transferred to [facility] for surgical evaluation by Dr. Cabrera.     Ascending aortic dissection s/p modified bentall and hemiarch on 9/6   Hypovolemic shock   Post op respiratory insufficiency  Acute blood loss anemia  Stress hyperglycemia   Lactic Acidosis  RIJ thrombus  Pancreatic cyst    Proteus PNA  Leukocytosis        Plan:   ***Neuro***  [x] Nonfocal   Post operative neuro assessment   Continue Seroquel, improvement in mental status    ***Cardiovascular***  CT Chest on 9/12:  Status post recent ascending aortic dissection repair. Small amount of fluid surrounding the ascending aorta without evidence of enhancing wall to suggest abscess.  RUE duplex on 9/12: There is a thrombosed and occluded RIJ   Invasive hemodynamic monitoring, assess perfusion indices   SR /MAP 81/Hct 37.6%/ Lactate 2.1  Continue lopressor for afib prophylaxis and rate control  Continue Mexiletine    [x] AC therapy with Eliquis         ***Pulmonary***  CT Chest on 9/12: Bilateral lower lobe atelectasis with bilateral pleural effusions   Reintubated for change in mental status on 9/8, self extubated on 9/9 and reintubated again, extubated to HFO2 on 9/13, now sating on room air in 90s  Encourage incentive spirometry, continue pulse ox monitoring, follow ABGs   Hx of COPD / Continue bronchodilators and Prednisone   Monitor secretions and suction PRN               ***GI***  CT A/P on 9/12: Mild thickening of the cecum and ascending colon possibly representing mild nonspecific proximal colitis. Hepatic cirrhosis. The focal IPMN of the pancreas with large cyst within the tail the pancreas measuring 3.1 cm.  Colostomy bag in place, continue to monitor output   [x] Diet: TFs Glucerna at goal 60 cc/hr  [x] Protonix   Passed FEES yesterday, CC diet with reg liquids, will see how patients appetite is prior to shutting off tubes feeds      ***Renal***  Continue to monitor I/Os, BUN/Creatinine.   Replete lytes PRN  Amezcua present will give trial to void today  Continue diuresis with Lasix and Aldactone      ***ID***  SCx on 9/8 +Proteus mirabilis   UA on 9/9+ LE, WBC 60, few yeast  /  UCx on 9/10 NG   BCx on 9/10 NGTD, BCx on 9/12 NGTD 9/16 bcx NGTD  Nystatin for thrush   Diflucan added on 9/19 for Yeast found in Urine on 9/16  Antibiotics discontinued due to lack of source per ID rec. Leukocytosis improving. Afebrile. Will monitor off antibiotics.        ***Endocrine***  [x]  DM : HbA1c 9.4%                - [x] Insulin gtt [x] ISS [x] NPH             - Need tight glycemic control to prevent wound infection.  Endo following for recs now that patient has some PO intake with tube feed supplementation       Patient requires continuous monitoring with bedside rhythm monitoring, pulse oximetry monitoring, and continuous central venous and arterial pressure monitoring; and intermittent blood gas analysis. Care plan discussed with the ICU care team.   Patient remained critical, at risk for life threatening decompensation.    I have spent 30 minutes providing critical care management to this patient.    By signing my name below, I, Megan Guerra, attest that this documentation has been prepared under the direction and in the presence of Radha Nichols NP   Electronically signed: Georgi Luna, 09-21-22 @ 08:27    I, Radha Nichols NP , personally performed the services described in this documentation. all medical record entries made by the marcyibe were at my direction and in my presence. I have reviewed the chart and agree that the record reflects my personal performance and is accurate and complete  Electronically signed: Radha Nichols NP

## 2022-09-21 NOTE — PROGRESS NOTE ADULT - NSPROGADDITIONALINFOA_GEN_ALL_CORE
-Plan discussed with pt/team.  Contact info: [phone] (24/7). pager [phone]  [email or web address] password [email or web address]  Spent 30 minutes assessing pt/labs/meds and discussing plan of care with primary team  Adjusting insulin  Discharge plan  Follow up care

## 2022-09-21 NOTE — PROGRESS NOTE ADULT - ASSESSMENT
71 year-old female with a history of DM2, CAD, A-Fib on apixaban, Severe Persistent Asthma (on chronic steroids, recently started on Tezspire), colon cancer s/p resection/chemo, and tracheobronchomalacia s/p tracheoplasty, and recent OM of the R foot s/b debridement and completed course of Vancomycin/Ertapenem for MRSA/ESBL Proteus/Corynebacterium who now presents with chest pain, found to have Type A dissection s/p repair with modified Bentall procedure and hemiarch replacement on 9/6/22. Post-op course complicated by acute hypoxemic respiratory failure, shock, anemia, and hyperglycemia requiring insulin gtt. Extubated 9/13, now awake and alert with mild encephalopathy but overall significantly improved.    Assessment:  Acute hypoxemic respiratory failure requiring intubation  Type A Aortic Dissection  Severe Persistent Asthma  History of Tracheobronchomalacia    Plan:  - Currently doing well on NC O2-2 liters NC  - If respiratory status remains stable, goal SpO2>92%  - Continue prednisone at 8 mg daily (chronic dose for her severe persistent asthma)  - Albuterol and ipratropium nebs q6h    - Budesonide 0.5 mg nebs q12h    - Chest PT, incentive spirometry, out of bed to chair  - S/p Tezspire injection on 8/29  - montelukast 10 mg at bedtime (takes Zafirlukast as outpatient)  - Remains on heparin gtt for A-Fib + RIJ thrombus  - R arm elevation and warm compresses for superficial thrombophlebitis with edema  - Remains on mexiletine and metoprolol for A-Fib  - Currently appears euvolemic, on furosemide 40 mg oral daily, strict I/O's, close monitoring of kidney function and lytes  - On meropenem for Proteus mirabilis pneumonia + ESBL Proteus mirabilis infection of right foot, f/up ID and podiatry  - Discussed with patient and daughter Zoe at bedside, full code  **************************                                SEE Below:  9/14-improving but weakness  9/15-ABX adjusted to ceftriaxone (LFTS)-PICU  9/16-PICU, low grade temp-fever work up in progress, no resp decline or sx  9/19-resp stable at present but tenuous  9/20-for FEESST today, NGT in place w/TF  9/21-s/p FEESST-passed, remains in PICU

## 2022-09-21 NOTE — PROGRESS NOTE ADULT - SUBJECTIVE AND OBJECTIVE BOX
Follow Up:  fever    Interval History/ROS: pt remains afebrile off antibiotics, passed swallow test, slight abd pain but no SOB or vomiting          Allergies  aspirin (Short breath)  Avelox (Short breath; Pruritus)  cefepime (Anaphylaxis)  codeine (Short breath)  Dilaudid (Short breath)  iodine (Short breath; Swelling)  penicillin (Anaphylaxis)  shellfish (Anaphylaxis)  tetanus toxoid (Short breath)  Valium (Short breath)        ANTIMICROBIALS:  nystatin    Suspension [id number] every 6 hours      OTHER MEDS:  acetaminophen    Suspension .. 675 milliGRAM(s) Oral every 6 hours PRN  albuterol/ipratropium for Nebulization 3 milliLiter(s) Nebulizer every 12 hours PRN  albuterol/ipratropium for Nebulization 3 milliLiter(s) Nebulizer every 6 hours  apixaban 5 milliGRAM(s) Oral every 12 hours  buDESOnide    Inhalation Suspension 0.5 milliGRAM(s) Inhalation every 12 hours  chlorhexidine 2% Cloths 1 Application(s) Topical <User Schedule>  collagenase Ointment 1 Application(s) Topical daily  dextrose 50% Injectable 50 milliLiter(s) IV Push every 15 minutes  insulin lispro (ADMELOG) corrective regimen sliding scale   SubCutaneous every 6 hours  insulin NPH human recombinant 24 Unit(s) SubCutaneous every 6 hours  metoprolol tartrate 25 milliGRAM(s) Oral every 8 hours  mexiletine 200 milliGRAM(s) Oral every 8 hours  montelukast 10 milliGRAM(s) Oral at bedtime  multivitamin 1 Tablet(s) Oral daily  pantoprazole  Injectable 40 milliGRAM(s) IV Push daily  predniSONE   Tablet 8 milliGRAM(s) Oral daily  QUEtiapine 25 milliGRAM(s) Oral at bedtime  spironolactone 25 milliGRAM(s) Oral every 12 hours      Vital Signs Last 24 Hrs  T(C): 36 (21 Sep 2022 11:00), Max: 36.9 (21 Sep 2022 00:00)  T(F): 96.8 (21 Sep 2022 11:00), Max: 98.4 (21 Sep 2022 00:00)  HR: 75 (21 Sep 2022 14:00) (68 - 112)  BP: 133/80 (21 Sep 2022 14:00) (97/70 - 143/63)  BP(mean): 96 (21 Sep 2022 14:00) (72 - 101)  RR: 21 (21 Sep 2022 14:00) (14 - 34)  SpO2: 100% (21 Sep 2022 14:00) (95% - 100%)    Parameters below as of 21 Sep 2022 14:00  Patient On (Oxygen Delivery Method): room air        Physical Exam:  General:   non toxic, sitting on a chair  Respiratory:  clear b/l,    no wheezing  abd:     soft,   BS +,   no tenderness, ostomy with liquid brown stool  :   no CVAT,  no suprapubic tenderness,   no  ramirez  Musculoskeletal:   no joint swelling  vascular: R chest port with no tenderness or erythema                          11.4   16.85 )-----------( 439      ( 21 Sep 2022 01:33 )             37.6       09-21    137  |  99  |  33<H>  ----------------------------<  109<H>  4.8   |  25  |  0.72    Ca    9.4      21 Sep 2022 01:33  Phos  3.3     09-21  Mg     2.3     09-21    TPro  7.3  /  Alb  3.8  /  TBili  0.2  /  DBili  x   /  AST  27  /  ALT  65<H>  /  AlkPhos  124<H>  09-21          MICROBIOLOGY:  v  Catheterized Catheterized  09-16-22   50,000 - 99,000 CFU/mL Candida albicans "Susceptibilities not performed"  --  --      .Blood Blood-Peripheral  09-16-22   No Growth Final  --  --      .Blood Blood  09-12-22   No Growth Final  --  --      .Blood Blood  09-10-22   No Growth Final  --  --      Catheterized Catheterized  09-10-22   <10,000 CFU/mL Normal Urogenital Jessica  --  --      .Sputum Sputum  09-08-22   Numerous Proteus mirabilis  Unable to evaluate further due to Proteus overgrowth  --  Proteus mirabilis                RADIOLOGY:  Images independently visualized and reviewed personally, findings as below  < from: Xray Chest 1 View- PORTABLE-Routine (Xray Chest 1 View- PORTABLE-Routine in AM.) (09.20.22 @ 05:04) >  FINDINGS:  There is an enteric tube, coursing below the diaphragm, with its tip out   of the field of view.  Implantable port overlying the right chest wall with its tip terminating   in the cavoatrial junction.  Median sternotomy wires and aortic valve replacement are noted.  The heart size cannot be accurately assessed in this projection.  The lungs are clear.  Small left pleural effusion.  There is no pneumothorax.  No acute bony abnormality.    IMPRESSION:  Lines and tubes, as above.  Small left pleural effusion.    < end of copied text >  < from: CT Abdomen and Pelvis w/ IV Cont (09.12.22 @ 16:31) >  IMPRESSION:  1.  Status post recent ascending aortic dissection repair. Small amount   of fluid surrounding the ascending aorta without evidence of enhancing   wall to suggest abscess.  2.  Bilateral lower lobe atelectasis with bilateral pleural effusions.  3.  Mild thickening of the cecum and ascending colon possibly   representing mild nonspecific proximal colitis.  4.  Hepatic cirrhosis.  5.  The focal IPMN of the pancreas with large cyst within the tail the   pancreas measuring 3.1 cm. Follow-up recommended.    < end of copied text >  < from: Intra-Operative Transesophageal Echo (09.06.22 @ 22:54) >  Conclusions:  1. Normal mitral valve. Mild mitral regurgitation.  2. Calcified trileaflet aortic valve with decreased  opening. Moderate aortic regurgitation.  3. Aortic Annulus: 1.9 cm.  Aortic Root: 3.9 cm.  Sinotubular Junction: 4 cm.  LVOT diameter: 1.9 cm.  A dissection flap was noted in the proximal ascending aorta  extending into arch and decscending thoracic and abdominal  aorta. A clear end of dissection was by SAFIA in transgastric  views.  SAFIA guidaince for aortic cannulation of true lumen  provided.  4. Moderate concentric left ventricular hypertrophy.  5. Normal left ventricular systolic function. No segmental  wall motion abnormalities.  6. Normal right atrium.  7. Normal right ventricular size and function.  8. Normal tricuspid valve. Mild tricuspid regurgitation.  9. Moderate pericardial effusion with no tamponade noted  pre-bypass    < end of copied text >

## 2022-09-21 NOTE — PROGRESS NOTE ADULT - ASSESSMENT
71 f with DM, CAD, a-fib, asthma/COPD, Chronic Adrenal Insufficiency on steroids, history of colorectal cancer s/p resection and ostomy, tracheomalacia and multiple intubations, recent admission for sepsis, foot osteo and abscess s/p debridement and OR cx with MRSA, ESBL proteus and corynebacterium s/p 6 weeks of vanco and ertapenem which ended 8/17, now p/w chest pain, found to have  type A aortic dissection, s/p modified Bentall and hemiarch on 9/6/22.   Post op course complicated by shock, respiratory failure, anemia and hyperglycemia.   fever started 9/9, sputum cx with proteus mirabilis    fever post op for aortic dissection, sputum cx with proteus, pt was still febrile on zosyn but last wound cx that showed proteus was ESBL, switched to suraj and still persistently febrile and the proteus now is pan sensitive, chest /abd CT 9/12 with small fluid around the ascending aorta but no enhancing or abscess, b/l lower lobe atelectasis, ?mild colitis  doppler: thrombosed and occluded RIJ  adrenal insufficiency, was on prednisone, was given dexa 9/12 and 9/13 and no more fevers until 16th, extubated 9/13 so the persistent fevers could be in the setting of adrenal insufficiency as imaging and cx negative, pt already extubated and no focal symptoms  blood and urine cx negative, ALT, AST increased today to 200s, no bili or ALK, unlikely due to suraj  penicillin and cefepime allergy in chart but pt has received cefepime and ceftriaxone before    * was on cefepime 9/9, switched to suraj 9/10, and then ceftriaxone on 6/14 to complete the course but pt became febrile  although clinically well, non toxic, vanco, suraj was resumed by CT on 9/16, blood cx negative and urine cx with candida albicans, discontinued 9/19  * I don't think candida albicans in the urine is responsible for patient's fevers,  fluconazole discontinued 9/19 ( received 4 days)   * monitor off antibiotics, if fever or any change in the status will repeat cultures and reevaluate  * monitor WBC/diff and renal function      The above assessment and plan was discussed with CTU    Michelle Rolon MD  contact on teams  After 5pm and on weekends call [phone]

## 2022-09-22 LAB
ALBUMIN SERPL ELPH-MCNC: 3.7 G/DL — SIGNIFICANT CHANGE UP (ref 3.3–5)
ALP SERPL-CCNC: 130 U/L — HIGH (ref 40–120)
ALT FLD-CCNC: 59 U/L — HIGH (ref 10–45)
ANION GAP SERPL CALC-SCNC: 12 MMOL/L — SIGNIFICANT CHANGE UP (ref 5–17)
AST SERPL-CCNC: 37 U/L — SIGNIFICANT CHANGE UP (ref 10–40)
BILIRUB SERPL-MCNC: 0.3 MG/DL — SIGNIFICANT CHANGE UP (ref 0.2–1.2)
BUN SERPL-MCNC: 35 MG/DL — HIGH (ref 7–23)
CALCIUM SERPL-MCNC: 9.5 MG/DL — SIGNIFICANT CHANGE UP (ref 8.4–10.5)
CHLORIDE SERPL-SCNC: 99 MMOL/L — SIGNIFICANT CHANGE UP (ref 96–108)
CO2 SERPL-SCNC: 26 MMOL/L — SIGNIFICANT CHANGE UP (ref 22–31)
CREAT SERPL-MCNC: 0.7 MG/DL — SIGNIFICANT CHANGE UP (ref 0.5–1.3)
EGFR: 91 ML/MIN/1.73M2 — SIGNIFICANT CHANGE UP
GLUCOSE BLDC GLUCOMTR-MCNC: 125 MG/DL — HIGH (ref 70–99)
GLUCOSE BLDC GLUCOMTR-MCNC: 154 MG/DL — HIGH (ref 70–99)
GLUCOSE BLDC GLUCOMTR-MCNC: 198 MG/DL — HIGH (ref 70–99)
GLUCOSE BLDC GLUCOMTR-MCNC: 201 MG/DL — HIGH (ref 70–99)
GLUCOSE BLDC GLUCOMTR-MCNC: 42 MG/DL — CRITICAL LOW (ref 70–99)
GLUCOSE BLDC GLUCOMTR-MCNC: 51 MG/DL — CRITICAL LOW (ref 70–99)
GLUCOSE BLDC GLUCOMTR-MCNC: 61 MG/DL — LOW (ref 70–99)
GLUCOSE SERPL-MCNC: 73 MG/DL — SIGNIFICANT CHANGE UP (ref 70–99)
HCT VFR BLD CALC: 38.6 % — SIGNIFICANT CHANGE UP (ref 34.5–45)
HGB BLD-MCNC: 11.5 G/DL — SIGNIFICANT CHANGE UP (ref 11.5–15.5)
MAGNESIUM SERPL-MCNC: 2.3 MG/DL — SIGNIFICANT CHANGE UP (ref 1.6–2.6)
MCHC RBC-ENTMCNC: 23.2 PG — LOW (ref 27–34)
MCHC RBC-ENTMCNC: 29.8 GM/DL — LOW (ref 32–36)
MCV RBC AUTO: 78 FL — LOW (ref 80–100)
NRBC # BLD: 1 /100 WBCS — HIGH (ref 0–0)
PHOSPHATE SERPL-MCNC: 4.3 MG/DL — SIGNIFICANT CHANGE UP (ref 2.5–4.5)
PLATELET # BLD AUTO: 420 K/UL — HIGH (ref 150–400)
POTASSIUM SERPL-MCNC: 4.5 MMOL/L — SIGNIFICANT CHANGE UP (ref 3.5–5.3)
POTASSIUM SERPL-MCNC: 5.4 MMOL/L — HIGH (ref 3.5–5.3)
POTASSIUM SERPL-SCNC: 4.5 MMOL/L — SIGNIFICANT CHANGE UP (ref 3.5–5.3)
POTASSIUM SERPL-SCNC: 5.4 MMOL/L — HIGH (ref 3.5–5.3)
PROT SERPL-MCNC: 7.6 G/DL — SIGNIFICANT CHANGE UP (ref 6–8.3)
RBC # BLD: 4.95 M/UL — SIGNIFICANT CHANGE UP (ref 3.8–5.2)
RBC # FLD: 27.9 % — HIGH (ref 10.3–14.5)
SODIUM SERPL-SCNC: 137 MMOL/L — SIGNIFICANT CHANGE UP (ref 135–145)
WBC # BLD: 15.44 K/UL — HIGH (ref 3.8–10.5)
WBC # FLD AUTO: 15.44 K/UL — HIGH (ref 3.8–10.5)

## 2022-09-22 PROCEDURE — 99291 CRITICAL CARE FIRST HOUR: CPT | Mod: 24

## 2022-09-22 PROCEDURE — 99232 SBSQ HOSP IP/OBS MODERATE 35: CPT

## 2022-09-22 PROCEDURE — 99231 SBSQ HOSP IP/OBS SF/LOW 25: CPT

## 2022-09-22 PROCEDURE — 71045 X-RAY EXAM CHEST 1 VIEW: CPT | Mod: 26

## 2022-09-22 RX ORDER — HUMAN INSULIN 100 [IU]/ML
25 INJECTION, SUSPENSION SUBCUTANEOUS
Refills: 0 | Status: DISCONTINUED | OUTPATIENT
Start: 2022-09-22 | End: 2022-09-23

## 2022-09-22 RX ORDER — DEXTROSE 50 % IN WATER 50 %
50 SYRINGE (ML) INTRAVENOUS ONCE
Refills: 0 | Status: COMPLETED | OUTPATIENT
Start: 2022-09-22 | End: 2022-09-22

## 2022-09-22 RX ORDER — HUMAN INSULIN 100 [IU]/ML
35 INJECTION, SUSPENSION SUBCUTANEOUS
Refills: 0 | Status: DISCONTINUED | OUTPATIENT
Start: 2022-09-23 | End: 2022-09-23

## 2022-09-22 RX ORDER — HUMAN INSULIN 100 [IU]/ML
30 INJECTION, SUSPENSION SUBCUTANEOUS
Refills: 0 | Status: DISCONTINUED | OUTPATIENT
Start: 2022-09-22 | End: 2022-09-22

## 2022-09-22 RX ORDER — HUMAN INSULIN 100 [IU]/ML
15 INJECTION, SUSPENSION SUBCUTANEOUS
Refills: 0 | Status: DISCONTINUED | OUTPATIENT
Start: 2022-09-22 | End: 2022-09-23

## 2022-09-22 RX ORDER — HUMAN INSULIN 100 [IU]/ML
40 INJECTION, SUSPENSION SUBCUTANEOUS
Refills: 0 | Status: DISCONTINUED | OUTPATIENT
Start: 2022-09-22 | End: 2022-09-22

## 2022-09-22 RX ADMIN — Medication 500000 UNIT(S): at 05:03

## 2022-09-22 RX ADMIN — Medication 50 MILLILITER(S): at 05:13

## 2022-09-22 RX ADMIN — HUMAN INSULIN 25 UNIT(S): 100 INJECTION, SUSPENSION SUBCUTANEOUS at 17:25

## 2022-09-22 RX ADMIN — Medication 2: at 11:42

## 2022-09-22 RX ADMIN — Medication 500000 UNIT(S): at 17:24

## 2022-09-22 RX ADMIN — APIXABAN 5 MILLIGRAM(S): 2.5 TABLET, FILM COATED ORAL at 11:30

## 2022-09-22 RX ADMIN — Medication 500000 UNIT(S): at 11:30

## 2022-09-22 RX ADMIN — Medication 8 MILLIGRAM(S): at 05:02

## 2022-09-22 RX ADMIN — Medication 3 MILLILITER(S): at 05:36

## 2022-09-22 RX ADMIN — Medication 25 MILLIGRAM(S): at 05:02

## 2022-09-22 RX ADMIN — Medication 500000 UNIT(S): at 23:48

## 2022-09-22 RX ADMIN — Medication 40 MILLIGRAM(S): at 05:02

## 2022-09-22 RX ADMIN — Medication 0.5 MILLIGRAM(S): at 17:44

## 2022-09-22 RX ADMIN — Medication 0.5 MILLIGRAM(S): at 05:37

## 2022-09-22 RX ADMIN — Medication 50 MILLILITER(S): at 23:46

## 2022-09-22 RX ADMIN — QUETIAPINE FUMARATE 25 MILLIGRAM(S): 200 TABLET, FILM COATED ORAL at 23:47

## 2022-09-22 RX ADMIN — Medication 25 MILLIGRAM(S): at 23:47

## 2022-09-22 RX ADMIN — Medication 1 APPLICATION(S): at 05:49

## 2022-09-22 RX ADMIN — Medication 3 MILLILITER(S): at 17:43

## 2022-09-22 RX ADMIN — APIXABAN 5 MILLIGRAM(S): 2.5 TABLET, FILM COATED ORAL at 23:47

## 2022-09-22 RX ADMIN — MEXILETINE HYDROCHLORIDE 200 MILLIGRAM(S): 150 CAPSULE ORAL at 13:10

## 2022-09-22 RX ADMIN — SPIRONOLACTONE 25 MILLIGRAM(S): 25 TABLET, FILM COATED ORAL at 05:02

## 2022-09-22 RX ADMIN — Medication 1 TABLET(S): at 11:59

## 2022-09-22 RX ADMIN — HUMAN INSULIN 40 UNIT(S): 100 INJECTION, SUSPENSION SUBCUTANEOUS at 11:42

## 2022-09-22 RX ADMIN — MONTELUKAST 10 MILLIGRAM(S): 4 TABLET, CHEWABLE ORAL at 23:47

## 2022-09-22 RX ADMIN — Medication 3 MILLILITER(S): at 11:39

## 2022-09-22 RX ADMIN — MEXILETINE HYDROCHLORIDE 200 MILLIGRAM(S): 150 CAPSULE ORAL at 23:47

## 2022-09-22 RX ADMIN — PANTOPRAZOLE SODIUM 40 MILLIGRAM(S): 20 TABLET, DELAYED RELEASE ORAL at 11:30

## 2022-09-22 RX ADMIN — SPIRONOLACTONE 25 MILLIGRAM(S): 25 TABLET, FILM COATED ORAL at 17:24

## 2022-09-22 RX ADMIN — Medication 25 MILLIGRAM(S): at 13:10

## 2022-09-22 RX ADMIN — MEXILETINE HYDROCHLORIDE 200 MILLIGRAM(S): 150 CAPSULE ORAL at 05:02

## 2022-09-22 NOTE — PROGRESS NOTE ADULT - SUBJECTIVE AND OBJECTIVE BOX
Follow Up:  fever    Interval History/ROS: pt remains afebrile off antibiotics,  no SOB or vomiting, WBC down to 15          Allergies  aspirin (Short breath)  Avelox (Short breath; Pruritus)  cefepime (Anaphylaxis)  codeine (Short breath)  Dilaudid (Short breath)  iodine (Short breath; Swelling)  penicillin (Anaphylaxis)  shellfish (Anaphylaxis)  tetanus toxoid (Short breath)  Valium (Short breath)        ANTIMICROBIALS:  nystatin    Suspension 298647 every 6 hours      OTHER MEDS:  acetaminophen    Suspension .. 675 milliGRAM(s) Oral every 6 hours PRN  albuterol/ipratropium for Nebulization 3 milliLiter(s) Nebulizer every 12 hours PRN  albuterol/ipratropium for Nebulization 3 milliLiter(s) Nebulizer every 6 hours  apixaban 5 milliGRAM(s) Oral every 12 hours  buDESOnide    Inhalation Suspension 0.5 milliGRAM(s) Inhalation every 12 hours  chlorhexidine 2% Cloths 1 Application(s) Topical <User Schedule>  collagenase Ointment 1 Application(s) Topical daily  furosemide    Tablet 40 milliGRAM(s) Oral daily  insulin lispro (ADMELOG) corrective regimen sliding scale   SubCutaneous every 6 hours  insulin NPH human recombinant 15 Unit(s) SubCutaneous <User Schedule>  insulin NPH human recombinant 25 Unit(s) SubCutaneous <User Schedule>  metoprolol tartrate 25 milliGRAM(s) Oral every 8 hours  mexiletine 200 milliGRAM(s) Oral every 8 hours  montelukast 10 milliGRAM(s) Oral at bedtime  multivitamin 1 Tablet(s) Oral daily  pantoprazole  Injectable 40 milliGRAM(s) IV Push daily  predniSONE   Tablet 8 milliGRAM(s) Oral daily  QUEtiapine 25 milliGRAM(s) Oral at bedtime  spironolactone 25 milliGRAM(s) Oral every 12 hours      Vital Signs Last 24 Hrs  T(C): 36.3 (22 Sep 2022 16:00), Max: 37.1 (22 Sep 2022 00:00)  T(F): 97.3 (22 Sep 2022 16:00), Max: 98.7 (22 Sep 2022 00:00)  HR: 99 (22 Sep 2022 17:00) (77 - 100)  BP: 118/78 (22 Sep 2022 17:00) (97/60 - 135/69)  BP(mean): 91 (22 Sep 2022 17:00) (71 - 101)  RR: 25 (22 Sep 2022 17:00) (11 - 30)  SpO2: 97% (22 Sep 2022 17:00) (95% - 100%)    Parameters below as of 22 Sep 2022 16:00  Patient On (Oxygen Delivery Method): room air        Physical Exam:  General:   non toxic, sitting on a chair  Respiratory:  clear b/l,    no wheezing  abd:     soft,   BS +,   no tenderness, ostomy with liquid brown stool  :   no CVAT,  no suprapubic tenderness,   no  ramirez  Musculoskeletal:   no joint swelling  vascular: R chest port with no tenderness or erythema                          11.5   15.44 )-----------( 420      ( 22 Sep 2022 00:55 )             38.6       09-22    x   |  x   |  x   ----------------------------<  x   4.5   |  x   |  x     Ca    9.5      22 Sep 2022 00:55  Phos  4.3     09-22  Mg     2.3     09-22    TPro  7.6  /  Alb  3.7  /  TBili  0.3  /  DBili  x   /  AST  37  /  ALT  59<H>  /  AlkPhos  130<H>  09-22          MICROBIOLOGY:  v  .Blood Blood  09-20-22   No growth to date.  --  --      Catheterized Catheterized  09-16-22   50,000 - 99,000 CFU/mL Candida albicans "Susceptibilities not performed"  --  --      .Blood Blood-Peripheral  09-16-22   No Growth Final  --  --      .Blood Blood  09-12-22   No Growth Final  --  --      .Blood Blood  09-10-22   No Growth Final  --  --      Catheterized Catheterized  09-10-22   <10,000 CFU/mL Normal Urogenital Jessica  --  --      .Sputum Sputum  09-08-22   Numerous Proteus mirabilis  Unable to evaluate further due to Proteus overgrowth  --  Proteus mirabilis                RADIOLOGY:  Images independently visualized and reviewed personally, findings as below  < from: Xray Chest 1 View- PORTABLE-Routine (Xray Chest 1 View- PORTABLE-Routine in AM.) (09.22.22 @ 02:49) >  FINDINGS:  There is an enteric tube, coursing below the diaphragm, with its tip out   of the field of view.  Implantable port overlying the right chest wall with its tip terminating   in the cavoatrial junction.  Median sternotomy wires and aortic valve replacement are noted.  The heart appears enlarged in this projection.  The lungs are clear.  Trace left pleural effusion.  There is no pneumothorax.  No acute bony abnormality.    IMPRESSION:  Lines and tubes, as above.  Trace left pleural effusion.    < end of copied text >  < from: CT Abdomen and Pelvis w/ IV Cont (09.12.22 @ 16:31) >    IMPRESSION:  1.  Status post recent ascending aortic dissection repair. Small amount   of fluid surrounding the ascending aorta without evidence of enhancing   wall to suggest abscess.  2.  Bilateral lower lobe atelectasis with bilateral pleural effusions.  3.  Mild thickening of the cecum and ascending colon possibly   representing mild nonspecific proximal colitis.  4.  Hepatic cirrhosis.  5.  The focal IPMN of the pancreas with large cyst within the tail the   pancreas measuring 3.1 cm. Follow-up recommended.    < end of copied text >

## 2022-09-22 NOTE — PROGRESS NOTE ADULT - PROBLEM SELECTOR PLAN 1
-test BG Q6h  -Adjust NPH as follows:  NPH 35 units (6am/12pm).   NPH 20 units (6pm)  NPH 15 units (12am)  Hold NPH if TF on hold. If BG tightly controlled, less than 110mg/dl can reduce dose 20-50% based on BG level.   -c/w Admelog moderate correction scale Q6h  -Once off TFs can consider Lantus 30 units (home dose 35) plus Admelog 10 (home dose) Make sure pt eats.   -notify endocrine team for any change to diet regimen  Discharge plan:  - Likely to discharge patient home on basal/bolus insulin. Final regimen pending clinical course.  - Recommend routine outpatient ophthalmology, podiatry  - Can f/u with endocrinologist Dr. Saavedra.  - Make sure pt has Rx for all DM supplies and insulin/ DM meds.  -Based on pt's clinical condition and mental status at this time she is not able to independently manage her DM. Will evaluate pt's ability to manage DM at time of discharge. If unable> pt will need family/ care giver (s) to manage DM care at home  -Might need rehab.

## 2022-09-22 NOTE — PROGRESS NOTE ADULT - TIME BILLING
as above: no new events-TF at 40cc as NGT in place, remains in PICU  multifactorial dyspnea-resp failure--severe persistent asthma, TBM s/p tracheoplasty, s/p Aortic aneurysm repair, Bronchitis (proteus)-O2 NC-keep 90%  severe persistent asthma--medrol 8mg, singulair 10, duoneb q 6, budes .5 bid, incentive spirometry, tezspire 8/29-next 9/29  TBM-s/p tracheoplasty--accapella, unable to use vest due to surgery  s/p Aortic aneurysm repair--as per [facility] staff care  AF-on heparin--eventual eliquis rx  DVT R-IJ-on heparin rx  ID-proteus bronchitis-s/p meropenum changed to ceftriaxone and back to meropenem/vanco-now off of ABX as of 9/19  PT-OOB as able           VC dysfunction--aspiration precautions-sp and sw evaln--NGT in place/TF; FEESST passed 9/20  [facility]                                    Heme onc f/up colon ca  prog--guarded in CTU/PICU                PT--OOB as much as possible and all neb rx is to be HHN and not FM (d/w nursing)    Eulalio Allison MD-Pulmonary   [phone]

## 2022-09-22 NOTE — PROGRESS NOTE ADULT - SUBJECTIVE AND OBJECTIVE BOX
Patient seen and examined at the bedside.    Remained critically ill on continuous ICU monitoring.    OBJECTIVE:  Vital Signs Last 24 Hrs  T(C): 35.8 (22 Sep 2022 04:00), Max: 37.1 (22 Sep 2022 00:00)  T(F): 96.5 (22 Sep 2022 04:00), Max: 98.7 (22 Sep 2022 00:00)  HR: 91 (22 Sep 2022 07:00) (68 - 96)  BP: 103/64 (22 Sep 2022 07:00) (98/59 - 143/63)  BP(mean): 77 (22 Sep 2022 07:00) (71 - 96)  RR: 11 (22 Sep 2022 07:00) (11 - 30)  SpO2: 96% (22 Sep 2022 07:00) (95% - 100%)    Parameters below as of 22 Sep 2022 06:12  Patient On (Oxygen Delivery Method): room air      Physical Exam:   General: obese, elderly female, OOBTC, NAD  Neurology: arousable, following commands   Eyes: right pupil reactive to light, left surgical pupil  ENT/Neck: Neck supple, trachea midline, No JVD  Respiratory: Clear bilaterally   CV: S1S2, no murmurs        [x] Sternal dressing        [x] NSR  Abdominal: Soft, NT, ND, colostomy in place  Extremities: trace pedal edema noted, + peripheral pulses   Skin: Dry dressing over right heel, no Rashes, Hematoma, Ecchymosis    Assessment:  71F PMH DM, COPD, Chronic Adrenal Insufficiency on Chronic prednisone, history of colorectal cancer s/p resection (colostomy bag), Hx of CAD, Chronic A fib on Eliquis, and tracheomalacia and multiple intubations, recent dx of OM presented to ED at [facility] this morning with epigastric pain, belching and central chest pain. Found on CTA to have type A aortic dissection and transferred to [facility] for surgical evaluation by Dr. Cabrera.     Ascending aortic dissection s/p modified bentall and hemiarch on 9/6   Hypovolemic shock   Post op respiratory insufficiency  Acute blood loss anemia  Stress hyperglycemia   Lactic Acidosis  RIJ thrombus  Pancreatic cyst    Proteus PNA  Leukocytosis      Plan:   ***Neuro***  [x] Nonfocal   Post operative neuro assessment   Continue Seroquel, improvement in mental status    ***Cardiovascular***  CT Chest on 9/12:  Status post recent ascending aortic dissection repair. Small amount of fluid surrounding the ascending aorta without evidence of enhancing wall to suggest abscess.  RUE duplex on 9/12: There is a thrombosed and occluded RIJ   Invasive hemodynamic monitoring, assess perfusion indices   SR /MAP 77/Hct 38.6%/ Lactate 2.1   Continue lopressor for afib prophylaxis and rate control  Continue Mexiletine    [x] AC therapy with Eliquis         ***Pulmonary***  CT Chest on 9/12: Bilateral lower lobe atelectasis with bilateral pleural effusions   Reintubated for change in mental status on 9/8, self extubated on 9/9 and reintubated again, extubated to HFO2 on 9/13, now on 6LNC  Encourage incentive spirometry, continue pulse ox monitoring, follow ABGs   Hx of COPD / Continue bronchodilators and Prednisone   Monitor secretions and suction PRN               ***GI***  CT A/P on 9/12: Mild thickening of the cecum and ascending colon possibly representing mild nonspecific proximal colitis. Hepatic cirrhosis. The focal IPMN of the pancreas with large cyst within the tail the pancreas measuring 3.1 cm.  Colostomy bag in place, continue to monitor output   [x] Diet: TFs Glucerna at goal 60 cc/hr  [x] Protonix   Passed FEES on 9/20, CC diet with reg liquids, will see how patients appetite is prior to shutting off tubes feeds      ***Renal***  Continue to monitor I/Os, BUN/Creatinine.   Replete lytes PRN  Amezcua present  Continue diuresis with Lasix and Aldactone      ***ID***  SCx on 9/8 +Proteus mirabilis   UA on 9/9+ LE, WBC 60, few yeast  /  UCx on 9/10 NG   BCx on 9/10 NGTD, BCx on 9/12 NGTD 9/16 bcx NGTD  Nystatin for thrush   Diflucan added on 9/19 for Yeast found in Urine on 9/16  Antibiotics discontinued due to lack of source per ID rec. Leukocytosis improving. Afebrile. Will monitor off antibiotics.        ***Endocrine***  [x]  DM : HbA1c 9.4%                - [x] Insulin gtt [x] ISS [x] NPH             - Need tight glycemic control to prevent wound infection.  Endo following for recs now that patient has some PO intake with tube feed supplementation         Patient requires continuous monitoring with bedside rhythm monitoring, pulse oximetry monitoring, and continuous central venous and arterial pressure monitoring; and intermittent blood gas analysis. Care plan discussed with the ICU care team.   Patient remained critical, at risk for life threatening decompensation.    I have spent 30 minutes providing critical care management to this patient.    By signing my name below, I, Megan Guerra, attest that this documentation has been prepared under the direction and in the presence of KIANA Wynn   Electronically signed: Georgi Luna, 09-22-22 @ 07:56    I, KIANA Wynn , personally performed the services described in this documentation. all medical record entries made by the marcyibe were at my direction and in my presence. I have reviewed the chart and agree that the record reflects my personal performance and is accurate and complete  Electronically signed: KIANA Wynn  Patient seen and examined at the bedside.    Remained critically ill on continuous ICU monitoring.    OBJECTIVE:  Vital Signs Last 24 Hrs  T(C): 35.8 (22 Sep 2022 04:00), Max: 37.1 (22 Sep 2022 00:00)  T(F): 96.5 (22 Sep 2022 04:00), Max: 98.7 (22 Sep 2022 00:00)  HR: 91 (22 Sep 2022 07:00) (68 - 96)  BP: 103/64 (22 Sep 2022 07:00) (98/59 - 143/63)  BP(mean): 77 (22 Sep 2022 07:00) (71 - 96)  RR: 11 (22 Sep 2022 07:00) (11 - 30)  SpO2: 96% (22 Sep 2022 07:00) (95% - 100%)    Parameters below as of 22 Sep 2022 06:12  Patient On (Oxygen Delivery Method): room air      Physical Exam:   General:  elderly female, OOBTC, NAD  Neurology: AOx3 following commands   ENT/Neck: Neck supple, trachea midline, No JVD  Respiratory: Clear bilaterally   CV: S1S2, no murmurs        [x] Sternal dressing        [x] NSR  Abdominal: Soft, NT, ND, colostomy in place  Extremities: trace pedal edema noted, + peripheral pulses   Skin: Dry dressing over right heel, no Rashes, Hematoma, Ecchymosis    Assessment:  71F PMH DM, COPD, Chronic Adrenal Insufficiency on Chronic prednisone, history of colorectal cancer s/p resection (colostomy bag), Hx of CAD, Chronic A fib on Eliquis, and tracheomalacia and multiple intubations, recent dx of OM presented to ED at [facility] this morning with epigastric pain, belching and central chest pain. Found on CTA to have type A aortic dissection and transferred to [facility] for surgical evaluation by Dr. Cabrera.     Ascending aortic dissection s/p modified bentall and hemiarch on 9/6   Hypovolemic shock   Post op respiratory insufficiency  Acute blood loss anemia  Stress hyperglycemia   Lactic Acidosis  RIJ thrombus  Pancreatic cyst    Proteus PNA  Leukocytosis      Plan:   ***Neuro***  [x] Nonfocal   Post operative neuro assessment   Continue Seroquel, improvement in mental status    ***Cardiovascular***  CT Chest on 9/12:  Status post recent ascending aortic dissection repair. Small amount of fluid surrounding the ascending aorta without evidence of enhancing wall to suggest abscess.  RUE duplex on 9/12: There is a thrombosed and occluded RIJ   Invasive hemodynamic monitoring, assess perfusion indices   SR /MAP 77/Hct 38.6%/ Lactate 2.1   Continue lopressor for afib prophylaxis and rate control  Continue Mexiletine    [x] AC therapy with Eliquis         ***Pulmonary***  CT Chest on 9/12: Bilateral lower lobe atelectasis with bilateral pleural effusions   Reintubated for change in mental status on 9/8, self extubated on 9/9 and reintubated again, extubated to HFO2 on 9/13, now on 6LNC  Encourage incentive spirometry, continue pulse ox monitoring, follow ABGs   Hx of COPD / Continue bronchodilators and Prednisone   Monitor secretions and suction PRN               ***GI***  CT A/P on 9/12: Mild thickening of the cecum and ascending colon possibly representing mild nonspecific proximal colitis. Hepatic cirrhosis. The focal IPMN of the pancreas with large cyst within the tail the pancreas measuring 3.1 cm.  Colostomy bag in place, continue to monitor output   [x] Diet: TFs Glucerna at goal 40 cc/hr  [x] Protonix   Passed FEES on 9/20, CC diet with reg liquids, pt with decreased appetite need to encourage PO intake    ***Renal***  Continue to monitor I/Os, BUN/Creatinine.   Replete lytes PRN  Ameczua present  Continue diuresis with Lasix and Aldactone      ***ID***  SCx on 9/8 +Proteus mirabilis   UA on 9/9+ LE, WBC 60, few yeast  /  UCx on 9/10 NG   BCx on 9/10 NGTD, BCx on 9/12 NGTD 9/16 bcx NGTD  Nystatin for thrush   Diflucan added on 9/19 for Yeast found in Urine on 9/16  Antibiotics discontinued due to lack of source per ID rec. Leukocytosis improving. Afebrile. Will monitor off antibiotics.        ***Endocrine***  [x]  DM : HbA1c 9.4%                - [x] Insulin gtt [x] ISS [x] NPH             - Need tight glycemic control to prevent wound infection.  Endo following for recs now that patient has some PO intake with tube feed supplementation         Patient requires continuous monitoring with bedside rhythm monitoring, pulse oximetry monitoring, and continuous central venous and arterial pressure monitoring; and intermittent blood gas analysis. Care plan discussed with the ICU care team.   Patient remained critical, at risk for life threatening decompensation.    I have spent 30 minutes providing critical care management to this patient.    By signing my name below, I, Megan Guerra, attest that this documentation has been prepared under the direction and in the presence of KIANA Wynn   Electronically signed: Georgi Luna, 09-22-22 @ 07:56    I, KIANA Wynn , personally performed the services described in this documentation. all medical record entries made by the marcyibronaldo were at my direction and in my presence. I have reviewed the chart and agree that the record reflects my personal performance and is accurate and complete  Electronically signed: KIANA Wynn

## 2022-09-22 NOTE — PROGRESS NOTE ADULT - NUTRITIONAL ASSESSMENT
Diet, Consistent Carbohydrate/No Snacks:   DASH/TLC {Sodium & Cholesterol Restricted} (DASH)  Tube Feeding Modality: Nasogastric  Glucerna 1.5 Chago (GLUCERNA1.5RTH)  Total Volume for 24 Hours (mL): 960  Continuous  Starting Tube Feed Rate {mL per Hour}: 40  Until Goal Tube Feed Rate (mL per Hour): 40  Tube Feed Duration (in Hours): 24  Tube Feed Start Time: 13:00  Supplement Feeding Modality:  Oral  Glucerna Shake Cans or Servings Per Day:  3       Frequency:  Daily (09-21-22 @ 13:47) [Active]

## 2022-09-22 NOTE — PROGRESS NOTE ADULT - ASSESSMENT
71F w/h/o uncontrolled T2DM (A1C 9.4%) on basal/bolus insulin PTA. Unknown DM complications. Also COPD, secondary adrenal Insufficiency on chronic steroids, colorectal cancer s/p resection (colostomy bag), Chronic A fib on Eliquis, and tracheomalacia and multiple intubations, recent dx of OM presented to ED at [facility] with epigastric pain, belching and central chest pain. Found on CTA to have type A aortic dissection and transferred to [facility] for surgical evaluation by Dr. Cabrera. Now s/p aortic dissection repair on 9/07/22. Endocrine consulted for assistance with transition from insulin drip to subcutaneous insulin while on TFs. Hypoglycemic early this AM as steroid effect wearing off overnight w/lower insulin requirements. Will adjust insulin based on BG values. Restarted on PO diet but not eating much of meals. BG goal (100-180mg/dl) .

## 2022-09-22 NOTE — PROGRESS NOTE ADULT - ASSESSMENT
71 year-old female with a history of DM2, CAD, A-Fib on apixaban, Severe Persistent Asthma (on chronic steroids, recently started on Tezspire), colon cancer s/p resection/chemo, and tracheobronchomalacia s/p tracheoplasty, and recent OM of the R foot s/b debridement and completed course of Vancomycin/Ertapenem for MRSA/ESBL Proteus/Corynebacterium who now presents with chest pain, found to have Type A dissection s/p repair with modified Bentall procedure and hemiarch replacement on 9/6/22. Post-op course complicated by acute hypoxemic respiratory failure, shock, anemia, and hyperglycemia requiring insulin gtt. Extubated 9/13, now awake and alert with mild encephalopathy but overall significantly improved.    Assessment:  Acute hypoxemic respiratory failure requiring intubation  Type A Aortic Dissection  Severe Persistent Asthma  History of Tracheobronchomalacia    Plan:  - Currently doing well on NC O2-2 liters NC  - If respiratory status remains stable, goal SpO2>92%  - Continue prednisone at 8 mg daily (chronic dose for her severe persistent asthma)  - Albuterol and ipratropium nebs q6h    - Budesonide 0.5 mg nebs q12h    - Chest PT, incentive spirometry, out of bed to chair  - S/p Tezspire injection on 8/29  - montelukast 10 mg at bedtime (takes Zafirlukast as outpatient)  - Remains on heparin gtt for A-Fib + RIJ thrombus  - R arm elevation and warm compresses for superficial thrombophlebitis with edema  - Remains on mexiletine and metoprolol for A-Fib  - Currently appears euvolemic, on furosemide 40 mg oral daily, strict I/O's, close monitoring of kidney function and lytes  - On meropenem for Proteus mirabilis pneumonia + ESBL Proteus mirabilis infection of right foot, f/up ID and podiatry  - Discussed with patient and daughter Zoe at bedside, full code  **************************                                SEE Below:  9/14-improving but weakness  9/15-ABX adjusted to ceftriaxone (LFTS)-PICU  9/16-PICU, low grade temp-fever work up in progress, no resp decline or sx  9/19-resp stable at present but tenuous  9/20-for FEESST today, NGT in place w/TF  9/21-s/p FEESST-passed, remains in PICU          9/22-TF in place at 40cc, more OOB

## 2022-09-22 NOTE — PROGRESS NOTE ADULT - ASSESSMENT
71 f with DM, CAD, a-fib, asthma/COPD, Chronic Adrenal Insufficiency on steroids, history of colorectal cancer s/p resection and ostomy, tracheomalacia and multiple intubations, recent admission for sepsis, foot osteo and abscess s/p debridement and OR cx with MRSA, ESBL proteus and corynebacterium s/p 6 weeks of vanco and ertapenem which ended 8/17, now p/w chest pain, found to have  type A aortic dissection, s/p modified Bentall and hemiarch on 9/6/22.   Post op course complicated by shock, respiratory failure, anemia and hyperglycemia.   fever started 9/9, sputum cx with proteus mirabilis    fever post op for aortic dissection, sputum cx with proteus, pt was still febrile on zosyn but last wound cx that showed proteus was ESBL, switched to suraj and still persistently febrile and the proteus now is pan sensitive, chest /abd CT 9/12 with small fluid around the ascending aorta but no enhancing or abscess, b/l lower lobe atelectasis, ?mild colitis  doppler: thrombosed and occluded RIJ  adrenal insufficiency, was on prednisone, was given dexa 9/12 and 9/13 and no more fevers until 16th, extubated 9/13 so the persistent fevers could be in the setting of adrenal insufficiency as imaging and cx negative, pt already extubated and no focal symptoms  blood and urine cx negative, ALT, AST increased today to 200s, no bili or ALK, unlikely due to suraj  penicillin and cefepime allergy in chart but pt has received cefepime and ceftriaxone before    * was on cefepime 9/9, switched to suraj 9/10, and then ceftriaxone on 6/14 to completed the course but pt became febrile  although clinically well, non toxic, vanco, suraj was resumed by CT on 9/16, blood cx negative and urine cx with candida albicans, discontinued 9/19, has been afebrile and WBC gradually improving likely multifacotial  * I don't think candida albicans in the urine is responsible for patient's fevers,  fluconazole discontinued 9/19 ( received 4 days)   * monitor off antibiotics, if fever or any change in the status will repeat cultures and reevaluate  * will sign off, please call with questions      The above assessment and plan was discussed with CTU    Michelle Niknam, MD  contact on teams  After 5pm and on weekends call [phone]

## 2022-09-22 NOTE — PROGRESS NOTE ADULT - SUBJECTIVE AND OBJECTIVE BOX
Diabetes Follow up note:    Chief complaint: T2DM    Interval Hx: Pt w/hypoglycemia to 60s this AM. 6am dose of NPH held. Pt seen at bedside. Tolerating TF rate down to 40cc/hr w/PO diet advanced over past 24 hours. Denies pain, sitting in chair today.     Review of Systems:  General: denies pain  GI: Tolerating POs. Denies N/V/D/Abd pain  CV: Denies CP/SOB  ENDO: No S&Sx of hypoglycemia    MEDS:  insulin lispro (ADMELOG) corrective regimen sliding scale   SubCutaneous every 6 hours  insulin NPH human recombinant 40 Unit(s) SubCutaneous <User Schedule>  insulin NPH human recombinant 15 Unit(s) SubCutaneous <User Schedule>  insulin NPH human recombinant 30 Unit(s) SubCutaneous <User Schedule>  predniSONE   Tablet 8 milliGRAM(s) Oral daily    nystatin    Suspension 830735 Unit(s) Oral every 6 hours    Allergies    aspirin (Short breath)  Avelox (Short breath; Pruritus)  cefepime (Anaphylaxis)  codeine (Short breath)  Dilaudid (Short breath)  iodine (Short breath; Swelling)  penicillin (Anaphylaxis)  shellfish (Anaphylaxis)  tetanus toxoid (Short breath)  Valium (Short breath)      PE:  General: Female sitting in chair. NAD>   Vital Signs Last 24 Hrs  T(C): 36.5 (22 Sep 2022 08:00), Max: 37.1 (22 Sep 2022 00:00)  T(F): 97.7 (22 Sep 2022 08:00), Max: 98.7 (22 Sep 2022 00:00)  HR: 93 (22 Sep 2022 10:00) (70 - 96)  BP: 104/62 (22 Sep 2022 10:00) (97/60 - 143/63)  BP(mean): 74 (22 Sep 2022 10:00) (71 - 96)  RR: 19 (22 Sep 2022 10:00) (11 - 30)  SpO2: 98% (22 Sep 2022 10:00) (95% - 100%)    Parameters below as of 22 Sep 2022 08:00  Patient On (Oxygen Delivery Method): room air    HEENT: NGT in place  Chest; midsternal dsg c/d/i  Abd: Soft, NT,ND,   Extremities: Warm  Neuro: A&O X3    LABS:  POCT Blood Glucose.: 198 mg/dL (09-22-22 @ 11:29)  POCT Blood Glucose.: 154 mg/dL (09-22-22 @ 06:05)  POCT Blood Glucose.: 201 mg/dL (09-22-22 @ 05:34)  POCT Blood Glucose.: 61 mg/dL (09-22-22 @ 05:11)  POCT Blood Glucose.: 108 mg/dL (09-21-22 @ 23:06)  POCT Blood Glucose.: 326 mg/dL (09-21-22 @ 17:44)  POCT Blood Glucose.: 344 mg/dL (09-21-22 @ 12:54)  POCT Blood Glucose.: 89 mg/dL (09-21-22 @ 06:45)  POCT Blood Glucose.: 99 mg/dL (09-21-22 @ 05:13)  POCT Blood Glucose.: 108 mg/dL (09-21-22 @ 02:36)  POCT Blood Glucose.: 119 mg/dL (09-20-22 @ 23:55)  POCT Blood Glucose.: 288 mg/dL (09-20-22 @ 17:33)  POCT Blood Glucose.: 314 mg/dL (09-20-22 @ 12:10)  POCT Blood Glucose.: 222 mg/dL (09-20-22 @ 09:56)  POCT Blood Glucose.: 153 mg/dL (09-20-22 @ 09:03)  POCT Blood Glucose.: 165 mg/dL (09-20-22 @ 08:07)  POCT Blood Glucose.: 282 mg/dL (09-20-22 @ 06:56)  POCT Blood Glucose.: 255 mg/dL (09-20-22 @ 05:35)  POCT Blood Glucose.: 201 mg/dL (09-20-22 @ 00:00)  POCT Blood Glucose.: 302 mg/dL (09-19-22 @ 17:34)  POCT Blood Glucose.: 120 mg/dL (09-19-22 @ 12:24)                            11.5   15.44 )-----------( 420      ( 22 Sep 2022 00:55 )             38.6       09-22    x   |  x   |  x   ----------------------------<  x   4.5   |  x   |  x     eGFR: x     Ca    9.5      09-22  Mg     2.3     09-22  Phos  4.3     09-22    TPro  7.6  /  Alb  3.7  /  TBili  0.3  /  DBili  x   /  AST  37  /  ALT  59<H>  /  AlkPhos  130<H>  09-22      Thyroid Function Tests:  09-06 @ 16:46 TSH 3.30 FreeT4 -- T3 -- Anti TPO -- Anti Thyroglobulin Ab -- TSI --      A1C with Estimated Average Glucose Result: 9.4 % (09-06-22 @ 16:46)  A1C with Estimated Average Glucose Result: 9.2 % (07-11-22 @ 07:36)  A1C with Estimated Average Glucose Result: 8.0 % (05-10-22 @ 12:26)  A1C with Estimated Average Glucose Result: 9.5 % (03-29-22 @ 13:50)  A1C with Estimated Average Glucose Result: 7.8 % (11-30-21 @ 09:05)          Contact number: karoline [phone] or [phone]

## 2022-09-22 NOTE — PROGRESS NOTE ADULT - SUBJECTIVE AND OBJECTIVE BOX
CHIEF COMPLAINT:  f/up sob, chronic resp failure, TBM, severe persistent asthma, VC dysfunction, Type A aortic dissection s/p repair w/modified "Bentall procedure and hemiarch replacement 9/6-no signif sob, mild cough and sputum production    Interval Events: TF at 40cc, OOB    REVIEW OF SYSTEMS:  Constitutional: No fevers or chills. No weight loss. + fatigue or generalized malaise.  Eyes: No itching or discharge from the eyes  ENT: No ear pain. No ear discharge. No nasal congestion. No post nasal drip. No epistaxis. No throat pain. No sore throat. No difficulty swallowing.   CV: No chest pain. No palpitations. No lightheadedness or dizziness.   Resp: No dyspnea at rest. + dyspnea on exertion. No orthopnea. No wheezing. No cough. No stridor. No sputum production. No chest pain with respiration.  GI: No nausea. No vomiting. No diarrhea.  MSK: No joint pain or pain in any extremities  Integumentary: No skin lesions. No pedal edema.  Neurological: + gross motor weakness. No sensory changes.  [+] All other systems negative  [ ] Unable to assess ROS because ________    OBJECTIVE:  ICU Vital Signs Last 24 Hrs  T(C): 37.1 (22 Sep 2022 00:00), Max: 37.1 (22 Sep 2022 00:00)  T(F): 98.7 (22 Sep 2022 00:00), Max: 98.7 (22 Sep 2022 00:00)  HR: 90 (22 Sep 2022 03:00) (68 - 95)  BP: 104/60 (22 Sep 2022 03:00) (98/59 - 143/63)  BP(mean): 74 (22 Sep 2022 03:00) (71 - 96)  ABP: 115/52 (21 Sep 2022 13:00) (89/54 - 119/57)  ABP(mean): 72 (21 Sep 2022 13:00) (67 - 78)  RR: 26 (22 Sep 2022 03:00) (16 - 30)  SpO2: 96% (22 Sep 2022 03:00) (95% - 100%)    O2 Parameters below as of 22 Sep 2022 04:00  Patient On (Oxygen Delivery Method): room air              09-20 @ 07:01 - 09-21 @ 07:00  --------------------------------------------------------  IN: 1604 mL / OUT: 1490 mL / NET: 114 mL    09-21 @ 07:01 - 09-22 @ 05:12  --------------------------------------------------------  IN: 1877 mL / OUT: 665 mL / NET: 1212 mL      CAPILLARY BLOOD GLUCOSE  222 (20 Sep 2022 10:00)      POCT Blood Glucose.: 108 mg/dL (21 Sep 2022 23:06)      PHYSICAL EXAM: NAD in bed on RA  General: Awake, alert, oriented X 3.   HEENT: Atraumatic, normocephalic.                 Mallampatti Grade 2                No nasal congestion.                No tonsillar or pharyngeal exudates.  Lymph Nodes: No palpable lymphadenopathy  Neck: No JVD. No carotid bruit.   Respiratory: abnormal chest expansion                         Normal percussion                         Normal and equal air entry                         No wheeze, rhonchi or rales.  Cardiovascular: S1 S2 normal. No murmurs, rubs or gallops.   Abdomen: Soft, non-tender, non-distended. No organomegaly. Normoactive bowel sounds.  Extremities: Warm to touch. Peripheral pulse palpable. No pedal edema.   Skin: No rashes or skin lesions  Neurological: Motor and sensory examination equal and normal in all four extremities.  Psychiatry: Appropriate mood and affect.    HOSPITAL MEDICATIONS:  MEDICATIONS  (STANDING):  albuterol/ipratropium for Nebulization 3 milliLiter(s) Nebulizer every 6 hours  apixaban 5 milliGRAM(s) Oral every 12 hours  buDESOnide    Inhalation Suspension 0.5 milliGRAM(s) Inhalation every 12 hours  chlorhexidine 2% Cloths 1 Application(s) Topical <User Schedule>  collagenase Ointment 1 Application(s) Topical daily  furosemide    Tablet 40 milliGRAM(s) Oral daily  insulin lispro (ADMELOG) corrective regimen sliding scale   SubCutaneous every 6 hours  insulin NPH human recombinant 40 Unit(s) SubCutaneous <User Schedule>  insulin NPH human recombinant 20 Unit(s) SubCutaneous <User Schedule>  metoprolol tartrate 25 milliGRAM(s) Oral every 8 hours  mexiletine 200 milliGRAM(s) Oral every 8 hours  montelukast 10 milliGRAM(s) Oral at bedtime  multivitamin 1 Tablet(s) Oral daily  nystatin    Suspension 512234 Unit(s) Oral every 6 hours  pantoprazole  Injectable 40 milliGRAM(s) IV Push daily  predniSONE   Tablet 8 milliGRAM(s) Oral daily  QUEtiapine 25 milliGRAM(s) Oral at bedtime  spironolactone 25 milliGRAM(s) Oral every 12 hours    MEDICATIONS  (PRN):  acetaminophen    Suspension .. 675 milliGRAM(s) Oral every 6 hours PRN Moderate Pain (4 - 6)  albuterol/ipratropium for Nebulization 3 milliLiter(s) Nebulizer every 12 hours PRN Shortness of Breath and/or Wheezing      LABS:                        11.5   15.44 )-----------( 420      ( 22 Sep 2022 00:55 )             38.6     09-22    x   |  x   |  x   ----------------------------<  x   4.5   |  x   |  x     Ca    9.5      22 Sep 2022 00:55  Phos  4.3     09-22  Mg     2.3     09-22    TPro  7.6  /  Alb  3.7  /  TBili  0.3  /  DBili  x   /  AST  37  /  ALT  59<H>  /  AlkPhos  130<H>  09-22        Arterial Blood Gas:  09-21 @ 01:30  7.47/38/86/28/96.9/3.8  ABG lactate: --        MICROBIOLOGY:     RADIOLOGY:  [ ] Reviewed and interpreted by me    Point of Care Ultrasound Findings:    PFT:    EKG:

## 2022-09-23 DIAGNOSIS — M86.9 OSTEOMYELITIS, UNSPECIFIED: ICD-10-CM

## 2022-09-23 DIAGNOSIS — J44.9 CHRONIC OBSTRUCTIVE PULMONARY DISEASE, UNSPECIFIED: ICD-10-CM

## 2022-09-23 DIAGNOSIS — C19 MALIGNANT NEOPLASM OF RECTOSIGMOID JUNCTION: ICD-10-CM

## 2022-09-23 DIAGNOSIS — J45.909 UNSPECIFIED ASTHMA, UNCOMPLICATED: ICD-10-CM

## 2022-09-23 DIAGNOSIS — I48.91 UNSPECIFIED ATRIAL FIBRILLATION: ICD-10-CM

## 2022-09-23 LAB
ALBUMIN SERPL ELPH-MCNC: 3.6 G/DL — SIGNIFICANT CHANGE UP (ref 3.3–5)
ALP SERPL-CCNC: 142 U/L — HIGH (ref 40–120)
ALT FLD-CCNC: 73 U/L — HIGH (ref 10–45)
ANION GAP SERPL CALC-SCNC: 14 MMOL/L — SIGNIFICANT CHANGE UP (ref 5–17)
AST SERPL-CCNC: 52 U/L — HIGH (ref 10–40)
BILIRUB SERPL-MCNC: 0.3 MG/DL — SIGNIFICANT CHANGE UP (ref 0.2–1.2)
BUN SERPL-MCNC: 41 MG/DL — HIGH (ref 7–23)
CALCIUM SERPL-MCNC: 9.9 MG/DL — SIGNIFICANT CHANGE UP (ref 8.4–10.5)
CHLORIDE SERPL-SCNC: 99 MMOL/L — SIGNIFICANT CHANGE UP (ref 96–108)
CO2 SERPL-SCNC: 24 MMOL/L — SIGNIFICANT CHANGE UP (ref 22–31)
CREAT SERPL-MCNC: 0.84 MG/DL — SIGNIFICANT CHANGE UP (ref 0.5–1.3)
EGFR: 73 ML/MIN/1.73M2 — SIGNIFICANT CHANGE UP
GLUCOSE BLDC GLUCOMTR-MCNC: 108 MG/DL — HIGH (ref 70–99)
GLUCOSE BLDC GLUCOMTR-MCNC: 170 MG/DL — HIGH (ref 70–99)
GLUCOSE BLDC GLUCOMTR-MCNC: 230 MG/DL — HIGH (ref 70–99)
GLUCOSE BLDC GLUCOMTR-MCNC: 269 MG/DL — HIGH (ref 70–99)
GLUCOSE BLDC GLUCOMTR-MCNC: 273 MG/DL — HIGH (ref 70–99)
GLUCOSE BLDC GLUCOMTR-MCNC: 75 MG/DL — SIGNIFICANT CHANGE UP (ref 70–99)
GLUCOSE SERPL-MCNC: 37 MG/DL — CRITICAL LOW (ref 70–99)
HCT VFR BLD CALC: 38 % — SIGNIFICANT CHANGE UP (ref 34.5–45)
HGB BLD-MCNC: 11.6 G/DL — SIGNIFICANT CHANGE UP (ref 11.5–15.5)
MAGNESIUM SERPL-MCNC: 2.3 MG/DL — SIGNIFICANT CHANGE UP (ref 1.6–2.6)
MCHC RBC-ENTMCNC: 23.5 PG — LOW (ref 27–34)
MCHC RBC-ENTMCNC: 30.5 GM/DL — LOW (ref 32–36)
MCV RBC AUTO: 77.1 FL — LOW (ref 80–100)
NRBC # BLD: 1 /100 WBCS — HIGH (ref 0–0)
PHOSPHATE SERPL-MCNC: 4 MG/DL — SIGNIFICANT CHANGE UP (ref 2.5–4.5)
PLATELET # BLD AUTO: 421 K/UL — HIGH (ref 150–400)
POTASSIUM SERPL-MCNC: 4.6 MMOL/L — SIGNIFICANT CHANGE UP (ref 3.5–5.3)
POTASSIUM SERPL-SCNC: 4.6 MMOL/L — SIGNIFICANT CHANGE UP (ref 3.5–5.3)
PROT SERPL-MCNC: 7.8 G/DL — SIGNIFICANT CHANGE UP (ref 6–8.3)
RBC # BLD: 4.93 M/UL — SIGNIFICANT CHANGE UP (ref 3.8–5.2)
RBC # FLD: 27.4 % — HIGH (ref 10.3–14.5)
SODIUM SERPL-SCNC: 137 MMOL/L — SIGNIFICANT CHANGE UP (ref 135–145)
WBC # BLD: 18.43 K/UL — HIGH (ref 3.8–10.5)
WBC # FLD AUTO: 18.43 K/UL — HIGH (ref 3.8–10.5)

## 2022-09-23 PROCEDURE — 99232 SBSQ HOSP IP/OBS MODERATE 35: CPT

## 2022-09-23 PROCEDURE — 71045 X-RAY EXAM CHEST 1 VIEW: CPT | Mod: 26

## 2022-09-23 RX ORDER — HUMAN INSULIN 100 [IU]/ML
10 INJECTION, SUSPENSION SUBCUTANEOUS
Refills: 0 | Status: DISCONTINUED | OUTPATIENT
Start: 2022-09-23 | End: 2022-09-24

## 2022-09-23 RX ORDER — HUMAN INSULIN 100 [IU]/ML
10 INJECTION, SUSPENSION SUBCUTANEOUS
Refills: 0 | Status: DISCONTINUED | OUTPATIENT
Start: 2022-09-23 | End: 2022-09-23

## 2022-09-23 RX ORDER — HUMAN INSULIN 100 [IU]/ML
15 INJECTION, SUSPENSION SUBCUTANEOUS
Refills: 0 | Status: DISCONTINUED | OUTPATIENT
Start: 2022-09-23 | End: 2022-09-23

## 2022-09-23 RX ORDER — HUMAN INSULIN 100 [IU]/ML
20 INJECTION, SUSPENSION SUBCUTANEOUS
Refills: 0 | Status: DISCONTINUED | OUTPATIENT
Start: 2022-09-23 | End: 2022-09-23

## 2022-09-23 RX ORDER — SODIUM CHLORIDE 9 MG/ML
3 INJECTION INTRAMUSCULAR; INTRAVENOUS; SUBCUTANEOUS EVERY 8 HOURS
Refills: 0 | Status: DISCONTINUED | OUTPATIENT
Start: 2022-09-23 | End: 2022-09-27

## 2022-09-23 RX ORDER — HUMAN INSULIN 100 [IU]/ML
15 INJECTION, SUSPENSION SUBCUTANEOUS
Refills: 0 | Status: DISCONTINUED | OUTPATIENT
Start: 2022-09-23 | End: 2022-09-24

## 2022-09-23 RX ORDER — FUROSEMIDE 40 MG
20 TABLET ORAL ONCE
Refills: 0 | Status: COMPLETED | OUTPATIENT
Start: 2022-09-23 | End: 2022-09-23

## 2022-09-23 RX ADMIN — Medication 3 MILLILITER(S): at 06:10

## 2022-09-23 RX ADMIN — QUETIAPINE FUMARATE 25 MILLIGRAM(S): 200 TABLET, FILM COATED ORAL at 22:53

## 2022-09-23 RX ADMIN — MONTELUKAST 10 MILLIGRAM(S): 4 TABLET, CHEWABLE ORAL at 22:53

## 2022-09-23 RX ADMIN — Medication 1 APPLICATION(S): at 12:00

## 2022-09-23 RX ADMIN — Medication 0.5 MILLIGRAM(S): at 18:10

## 2022-09-23 RX ADMIN — Medication 500000 UNIT(S): at 12:37

## 2022-09-23 RX ADMIN — Medication 25 MILLIGRAM(S): at 22:53

## 2022-09-23 RX ADMIN — MEXILETINE HYDROCHLORIDE 200 MILLIGRAM(S): 150 CAPSULE ORAL at 23:46

## 2022-09-23 RX ADMIN — Medication 6: at 12:38

## 2022-09-23 RX ADMIN — Medication 0.5 MILLIGRAM(S): at 06:09

## 2022-09-23 RX ADMIN — MEXILETINE HYDROCHLORIDE 200 MILLIGRAM(S): 150 CAPSULE ORAL at 15:45

## 2022-09-23 RX ADMIN — Medication 3 MILLILITER(S): at 22:52

## 2022-09-23 RX ADMIN — Medication 3 MILLILITER(S): at 11:04

## 2022-09-23 RX ADMIN — Medication 3 MILLILITER(S): at 23:48

## 2022-09-23 RX ADMIN — SPIRONOLACTONE 25 MILLIGRAM(S): 25 TABLET, FILM COATED ORAL at 05:08

## 2022-09-23 RX ADMIN — Medication 6: at 18:09

## 2022-09-23 RX ADMIN — APIXABAN 5 MILLIGRAM(S): 2.5 TABLET, FILM COATED ORAL at 12:37

## 2022-09-23 RX ADMIN — PANTOPRAZOLE SODIUM 40 MILLIGRAM(S): 20 TABLET, DELAYED RELEASE ORAL at 12:37

## 2022-09-23 RX ADMIN — SPIRONOLACTONE 25 MILLIGRAM(S): 25 TABLET, FILM COATED ORAL at 18:10

## 2022-09-23 RX ADMIN — Medication 25 MILLIGRAM(S): at 05:08

## 2022-09-23 RX ADMIN — SODIUM CHLORIDE 3 MILLILITER(S): 9 INJECTION INTRAMUSCULAR; INTRAVENOUS; SUBCUTANEOUS at 22:39

## 2022-09-23 RX ADMIN — Medication 1 TABLET(S): at 12:37

## 2022-09-23 RX ADMIN — CHLORHEXIDINE GLUCONATE 1 APPLICATION(S): 213 SOLUTION TOPICAL at 05:09

## 2022-09-23 RX ADMIN — Medication 8 MILLIGRAM(S): at 06:52

## 2022-09-23 RX ADMIN — Medication 20 MILLIGRAM(S): at 12:37

## 2022-09-23 RX ADMIN — Medication 40 MILLIGRAM(S): at 05:08

## 2022-09-23 RX ADMIN — Medication 500000 UNIT(S): at 23:47

## 2022-09-23 RX ADMIN — Medication 3 MILLILITER(S): at 00:55

## 2022-09-23 RX ADMIN — MEXILETINE HYDROCHLORIDE 200 MILLIGRAM(S): 150 CAPSULE ORAL at 05:08

## 2022-09-23 RX ADMIN — Medication 3 MILLILITER(S): at 18:10

## 2022-09-23 RX ADMIN — Medication 500000 UNIT(S): at 18:10

## 2022-09-23 RX ADMIN — Medication 500000 UNIT(S): at 05:09

## 2022-09-23 RX ADMIN — Medication 25 MILLIGRAM(S): at 15:45

## 2022-09-23 NOTE — PROGRESS NOTE ADULT - SUBJECTIVE AND OBJECTIVE BOX
DIABETES FOLLOW UP NOTE: Saw pt earlier today    Chief Complaint: Endocrine consult requested for management of T2DM    INTERVAL HX: Pt stable, transfer out from CTU to cardiac surgical unit. Pt alert, reports feeling hungry but noted breakfast tray untouched at bedside. Also noted TF of Glucerna at 40cc/hr, BG difficult to achieves between 40s to 200s in the last 24 hours. Had BG 42 around mn so NPH held with BG 70s in am and NPH held again with BG >200s at 12noon.  On Prednisone 8mg daily. Pt alert but appears disoriented and confused. States she is hungry but is not eating. BMP glucose 37 today.         Review of Systems:  General: As above  Cardiovascular: No chest pain, palpitations  Respiratory: No SOB, no cough  GI: No nausea, vomiting, abdominal pain  Endocrine: No polyuria, polydipsia or S&Sx of hypoglycemia    Allergies    aspirin (Short breath)  Avelox (Short breath; Pruritus)  cefepime (Anaphylaxis)  codeine (Short breath)  Dilaudid (Short breath)  iodine (Short breath; Swelling)  penicillin (Anaphylaxis)  shellfish (Anaphylaxis)  tetanus toxoid (Short breath)  Valium (Short breath)    Intolerances      MEDICATIONS:  insulin lispro (ADMELOG) corrective regimen sliding scale   SubCutaneous every 6 hours  insulin NPH human recombinant 15 Unit(s) SubCutaneous <User Schedule>  insulin NPH human recombinant 10 Unit(s) SubCutaneous <User Schedule>  insulin NPH human recombinant 10 Unit(s) SubCutaneous <User Schedule>  predniSONE   Tablet 8 milliGRAM(s) Oral daily        PHYSICAL EXAM:  VITALS: T(C): 36.6 (09-23-22 @ 10:54)  T(F): 97.8 (09-23-22 @ 10:54), Max: 99.1 (09-22-22 @ 20:00)  HR: 91 (09-23-22 @ 10:54) (83 - 100)  BP: 106/73 (09-23-22 @ 10:54) (99/68 - 129/80)  RR:  (16 - 29)  SpO2:  (94% - 100%)  Wt(kg): --  GENERAL: Female sitting in chair in NAD  HEENT: NGT in place with TFs at 40cc/hr at time of visit  CHEST: Surgical site with dressing D&I  Abdomen: Soft, nontender, non distended  Extremities: Warm, no edema in all 4 exts  NEURO: Alert answering questions but appears somewhat confused.      LABS:  POCT Blood Glucose.: 273 mg/dL (09-23-22 @ 12:36)  POCT Blood Glucose.: 170 mg/dL (09-23-22 @ 09:13)  POCT Blood Glucose.: 75 mg/dL (09-23-22 @ 05:05)  POCT Blood Glucose.: 108 mg/dL (09-23-22 @ 02:08)  POCT Blood Glucose.: 230 mg/dL (09-23-22 @ 00:01)  POCT Blood Glucose.: 51 mg/dL (09-22-22 @ 23:36)  POCT Blood Glucose.: 42 mg/dL (09-22-22 @ 23:33)  POCT Blood Glucose.: 125 mg/dL (09-22-22 @ 17:21)  POCT Blood Glucose.: 198 mg/dL (09-22-22 @ 11:29)  POCT Blood Glucose.: 154 mg/dL (09-22-22 @ 06:05)  POCT Blood Glucose.: 201 mg/dL (09-22-22 @ 05:34)  POCT Blood Glucose.: 61 mg/dL (09-22-22 @ 05:11)  POCT Blood Glucose.: 108 mg/dL (09-21-22 @ 23:06)  POCT Blood Glucose.: 326 mg/dL (09-21-22 @ 17:44)  POCT Blood Glucose.: 344 mg/dL (09-21-22 @ 12:54)  POCT Blood Glucose.: 89 mg/dL (09-21-22 @ 06:45)  POCT Blood Glucose.: 99 mg/dL (09-21-22 @ 05:13)  POCT Blood Glucose.: 108 mg/dL (09-21-22 @ 02:36)                              11.6   18.43 )-----------( 421      ( 23 Sep 2022 00:23 )             38.0       09-23    137  |  99  |  41<H>  ----------------------------<  37<LL>  4.6   |  24  |  0.84    eGFR: 73    Ca    9.9      09-23  Mg     2.3     09-23  Phos  4.0     09-23    TPro  7.8  /  Alb  3.6  /  TBili  0.3  /  DBili  x   /  AST  52<H>  /  ALT  73<H>  /  AlkPhos  142<H>  09-23    Thyroid Function Tests:  09-06 @ 16:46 TSH 3.30 FreeT4 -- T3 -- Anti TPO -- Anti Thyroglobulin Ab -- TSI --      A1C with Estimated Average Glucose Result: 9.4 % (09-06-22 @ 16:46)  A1C with Estimated Average Glucose Result: 9.2 % (07-11-22 @ 07:36)      Estimated Average Glucose: 223 mg/dL (09-06-22 @ 16:46)  Estimated Average Glucose: 217 mg/dL (07-11-22 @ 07:36)

## 2022-09-23 NOTE — PROGRESS NOTE ADULT - SUBJECTIVE AND OBJECTIVE BOX
S:  sitting in chair.  c/o jaw pain    Telemetry:  aflutter 100's    Vital Signs Last 24 Hrs  T(C): 36.6 (22 @ 10:54), Max: 37.3 (22 @ 20:00)  T(F): 97.8 (22 @ 10:54), Max: 99.1 (22 @ 20:00)  HR: 91 (22 @ 10:54) (83 - 100)  BP: 106/73 (22 @ 10:54) (99/68 - 129/80)  RR: 18 (22 @ 10:54) (16 - 29)  SpO2: 95% (22 @ 10:54) (94% - 100%)                    @ 07:01  -   @ 07:00  --------------------------------------------------------  IN: 1880 mL / OUT: 510 mL / NET: 1370 mL     @ 07:01  -   @ 15:13  --------------------------------------------------------  IN: 60 mL / OUT: 0 mL / NET: 60 mL           Daily Weight in k.6 (23 Sep 2022 00:00)          POCT Blood Glucose.: 273 mg/dL (23 Sep 2022 12:36)  POCT Blood Glucose.: 170 mg/dL (23 Sep 2022 09:13)  POCT Blood Glucose.: 75 mg/dL (23 Sep 2022 05:05)  POCT Blood Glucose.: 108 mg/dL (23 Sep 2022 02:08)  POCT Blood Glucose.: 230 mg/dL (23 Sep 2022 00:01)  POCT Blood Glucose.: 51 mg/dL (22 Sep 2022 23:36)  POCT Blood Glucose.: 42 mg/dL (22 Sep 2022 23:33)  POCT Blood Glucose.: 125 mg/dL (22 Sep 2022 17:21)          Drains:     MS         [  ] Drainage:                 L Pleural  [  ]  Drainage:                R Pleural  [  ]  Drainage:    Pacing Wires        [  ]   Settings:                                  Isolated  [  ]    Coumadin    [ ] YES          [ x ]      NO         Reason:   on eliquis                              11.6   18.43 )-----------( 421      ( 23 Sep 2022 00:23 )             38.0           137  |  99  |  41<H>  ----------------------------<  37<LL>  4.6   |  24  |  0.84    Ca    9.9      23 Sep 2022 00:23  Phos  4.0       Mg     2.3         TPro  7.8  /  Alb  3.6  /  TBili  0.3  /  DBili  x   /  AST  52<H>  /  ALT  73<H>  /  AlkPhos  142<H>            PHYSICAL EXAM:    Neurology: alert and oriented x 3, nonfocal, no gross deficits    CV : S1S2 RRR    Sternal Wound :  CDI , Stable    Lungs: + rhonchi.  no wheezes no rails    Abdomen: soft, nontender, nondistended, positive bowel sounds + colostomy        Extremities:    no edema.  dressing cdi           acetaminophen    Suspension .. 675 milliGRAM(s) Oral every 6 hours PRN  albuterol/ipratropium for Nebulization 3 milliLiter(s) Nebulizer every 12 hours PRN  albuterol/ipratropium for Nebulization 3 milliLiter(s) Nebulizer every 6 hours  apixaban 5 milliGRAM(s) Oral every 12 hours  buDESOnide    Inhalation Suspension 0.5 milliGRAM(s) Inhalation every 12 hours  chlorhexidine 2% Cloths 1 Application(s) Topical <User Schedule>  collagenase Ointment 1 Application(s) Topical daily  furosemide    Tablet 40 milliGRAM(s) Oral daily  insulin lispro (ADMELOG) corrective regimen sliding scale   SubCutaneous every 6 hours  insulin NPH human recombinant 15 Unit(s) SubCutaneous <User Schedule>  insulin NPH human recombinant 10 Unit(s) SubCutaneous <User Schedule>  insulin NPH human recombinant 10 Unit(s) SubCutaneous <User Schedule>  metoprolol tartrate 25 milliGRAM(s) Oral every 8 hours  mexiletine 200 milliGRAM(s) Oral every 8 hours  montelukast 10 milliGRAM(s) Oral at bedtime  multivitamin 1 Tablet(s) Oral daily  nystatin    Suspension 658140 Unit(s) Oral every 6 hours  pantoprazole  Injectable 40 milliGRAM(s) IV Push daily  predniSONE   Tablet 8 milliGRAM(s) Oral daily  QUEtiapine 25 milliGRAM(s) Oral at bedtime  sodium chloride 0.9% lock flush 3 milliLiter(s) IV Push every 8 hours  spironolactone 25 milliGRAM(s) Oral every 12 hours                              Physical Therapy Rec:   Home  [  ]   Home w/ PT  [  ]  Rehab  [ x ]    Discussed with Cardiothoracic Team at AM rounds.

## 2022-09-23 NOTE — PROGRESS NOTE ADULT - ASSESSMENT
71F w/h/o uncontrolled T2DM (A1C 9.4%) on basal/bolus insulin PTA. Unknown DM complications. Also COPD, secondary adrenal Insufficiency on chronic steroids, colorectal cancer s/p resection (colostomy bag), Chronic A fib on Eliquis, and tracheomalacia and multiple intubations, recent dx of OM presented to ED at [facility] with epigastric pain, belching and central chest pain. Found on CTA to have type A aortic dissection and transferred to [facility] for surgical evaluation by Dr. Cabrera. Now s/p aortic dissection repair on 9/07/22. Endocrine consulted for assistance with transition from insulin drip to subcutaneous insulin while on TFs. Ptwith hypo.hyperglycemia and unpredictable PO intake. Not eating much even though she reports feelign hungry. Noted hypoglycemia overnight and this am. Primary team reduced NPH doses today and noted pt didn received any NPH since yesterday at 6pm with BG >200s for lunch. Might be hyperglycemia this pm because TFs are on and NPH has not been given. Will assess BG while on new NPH orders but need to administer insulin if BG >200s and hold if BG  <100     home medications: Lantus 35 units sq qhs, novolog TID (based on carb count usually around 10 units)

## 2022-09-23 NOTE — PROGRESS NOTE ADULT - ASSESSMENT
71 year-old female with a history of DM2, CAD, A-Fib on apixaban, Severe Persistent Asthma (on chronic steroids, recently started on Tezspire), colon cancer s/p resection/chemo, and tracheobronchomalacia s/p tracheoplasty, and recent OM of the R foot s/b debridement and completed course of Vancomycin/Ertapenem for MRSA/ESBL Proteus/Corynebacterium who now presents with chest pain, found to have Type A dissection s/p repair with modified Bentall procedure and hemiarch replacement on 9/6/22. Post-op course complicated by acute hypoxemic respiratory failure, shock, anemia, and hyperglycemia requiring insulin gtt. Extubated 9/13, now awake and alert with mild encephalopathy but overall significantly improved.    Assessment:  Acute hypoxemic respiratory failure requiring intubation  Type A Aortic Dissection  Severe Persistent Asthma  History of Tracheobronchomalacia    Plan:  - Currently doing well on NC O2-2 liters NC  - If respiratory status remains stable, goal SpO2>92%  - Continue prednisone at 8 mg daily (chronic dose for her severe persistent asthma)  - Albuterol and ipratropium nebs q6h    - Budesonide 0.5 mg nebs q12h    - Chest PT, incentive spirometry, out of bed to chair  - S/p Tezspire injection on 8/29  - montelukast 10 mg at bedtime (takes Zafirlukast as outpatient)  - Remains on eliquis for A-Fib + RIJ thrombus  - R arm elevation and warm compresses for superficial thrombophlebitis with edema  - Remains on mexiletine and metoprolol for A-Fib  - Currently appears euvolemic, on furosemide 40 mg oral daily, strict I/O's, close monitoring of kidney function and lytes  - s/p meropenem for Proteus mirabilis pneumonia + ESBL Proteus mirabilis infection of right foot, f/up ID and podiatry  -, full code  **************************                                SEE Below:  9/14-improving but weakness  9/15-ABX adjusted to ceftriaxone (LFTS)-PICU  9/16-PICU, low grade temp-fever work up in progress, no resp decline or sx  9/19-resp stable at present but tenuous  9/20-for FEESST today, NGT in place w/TF  9/21-s/p FEESST-passed, remains in PICU          9/22-TF in place at 40cc, more OOB          9/23-hypoglycemia noted and rx, no change in resp status

## 2022-09-23 NOTE — PROGRESS NOTE ADULT - PROBLEM SELECTOR PLAN 1
- FS q6h while on TFs. Once off TFs ac and hs and 2am.   - Start NPH as ordered this am 15units at 6am and 12noon and 10 units at 6pm and 12mn. HOLD NPH IF TFS OFF OR BG <100   -Will add premeal insulin once pt eating consistently  -Re-Start calorie count   - Hold NPH if TFs are off/held  - Please contact endo team with any changes on TF/PO/Steroid status  Discharge plan:  - Likely to discharge patient home on basal/bolus insulin. Final regimen pending clinical course.  - Recommend routine outpatient ophthalmology, podiatry  - Can f/u with endocrinologist Dr. Saavedra.  - Make sure pt has Rx for all DM supplies and insulin/ DM meds.  -Based on pt's clinical condition and mental status at this time she is not able to independently manage her DM. Will evaluate pt's ability to manage DM at time of discharge. If unable> pt will need family/ care giver (s) to manage DM care at home  -Might need rehab.

## 2022-09-23 NOTE — PROGRESS NOTE ADULT - NUTRITIONAL ASSESSMENT
Diet, Consistent Carbohydrate/No Snacks:   DASH/TLC {Sodium & Cholesterol Restricted} (DASH)  Tube Feeding Modality: Nasogastric  Glucerna 1.5 Chago (GLUCERNA1.5RTH)  Total Volume for 24 Hours (mL): 960  Continuous  Starting Tube Feed Rate {mL per Hour}: 40  Until Goal Tube Feed Rate (mL per Hour): 40  Tube Feed Duration (in Hours): 24  Tube Feed Start Time: 13:00  Supplement Feeding Modality:  Oral  Glucerna Shake Cans or Servings Per Day:  3       Frequency:  Daily (09-21-22 @ 13:47) [Active]    Please see RD assessment and/or follow up.  Managed by primary team as well

## 2022-09-23 NOTE — PROGRESS NOTE ADULT - ASSESSMENT
71F w/h/o uncontrolled T2DM (A1C 9.4%) on basal/bolus insulin PTA. Unknown DM complications. Also COPD, secondary adrenal Insufficiency on chronic steroids, colorectal cancer s/p resection (colostomy bag), Chronic A fib on Eliquis, and tracheomalacia and multiple intubations, recent dx of OM presented to ED at [facility] with epigastric pain, belching and central chest pain. Found on CTA to have type A aortic dissection and transferred to [facility] for surgical evaluation by Dr. Cabrera.  POD# 16 Type A aortic dissection repair.  Pt had prolonged ICU stay.  Required reintubation.  Hx of asthma -Dr Allison following.  Adrenal insufficiency on long term prednisone --blood sugars have been labile--premeal reduced. Treated with iv antibiotics for osteomyelitis--podiatry signed off.  Passed FEEST.  On oral however still on TF for poor intake. Hx afib on eliquis currently aflutter

## 2022-09-23 NOTE — PROGRESS NOTE ADULT - NSPROGADDITIONALINFOA_GEN_ALL_CORE
-Plan discussed with pt/team.  Contact info: [phone] (24/7). pager [phone]  [email or web address] password [email or web address]  Spent 30 minutes assessing pt/labs/meds and discussing plan of care with primary team  Adjusting insulin  Discharge plan  Follow up care

## 2022-09-23 NOTE — PROGRESS NOTE ADULT - SUBJECTIVE AND OBJECTIVE BOX
CHIEF COMPLAINT: f/up sob, chronic resp failure, TBM, severe persistent asthma, VC dysfunction, Type A aortic dissection s/p repair w/modified "Bentall procedure and hemiarch replacement 9/6-no signif sob, some cough and mucus production-yellow, fatigue    Interval Events: hypoglycemia/TF    REVIEW OF SYSTEMS:  Constitutional: No fevers or chills. No weight loss. + fatigue or generalized malaise.  Eyes: No itching or discharge from the eyes  ENT: No ear pain. No ear discharge. No nasal congestion. No post nasal drip. No epistaxis. No throat pain. No sore throat. No difficulty swallowing.   CV: No chest pain. No palpitations. No lightheadedness or dizziness.   Resp: No dyspnea at rest. No dyspnea on exertion. No orthopnea. No wheezing. + cough. No stridor. + sputum production. No chest pain with respiration.  GI: No nausea. No vomiting. No diarrhea.  MSK: No joint pain or pain in any extremities  Integumentary: No skin lesions. No pedal edema.  Neurological: + gross motor weakness. No sensory changes.  [+ ] All other systems negative  [ ] Unable to assess ROS because ________    OBJECTIVE:  ICU Vital Signs Last 24 Hrs  T(C): 36.2 (23 Sep 2022 00:00), Max: 37.3 (22 Sep 2022 20:00)  T(F): 97.1 (23 Sep 2022 00:00), Max: 99.1 (22 Sep 2022 20:00)  HR: 92 (23 Sep 2022 04:00) (78 - 100)  BP: 99/68 (23 Sep 2022 04:00) (97/60 - 129/80)  BP(mean): 78 (23 Sep 2022 04:00) (71 - 101)  ABP: --  ABP(mean): --  RR: 21 (23 Sep 2022 04:00) (11 - 30)  SpO2: 98% (23 Sep 2022 04:00) (96% - 100%)    O2 Parameters below as of 23 Sep 2022 00:00  Patient On (Oxygen Delivery Method): room air              09-21 @ 07:01  -  09-22 @ 07:00  --------------------------------------------------------  IN: 2112 mL / OUT: 1065 mL / NET: 1047 mL    09-22 @ 07:01  -  09-23 @ 05:10  --------------------------------------------------------  IN: 1640 mL / OUT: 370 mL / NET: 1270 mL      CAPILLARY BLOOD GLUCOSE      POCT Blood Glucose.: 75 mg/dL (23 Sep 2022 05:05)      PHYSICAL EXAM: NAD in bed on NC O2  General: Awake, alert, oriented X 3.   HEENT: Atraumatic, normocephalic.                 Mallampatti Grade 2                No nasal congestion.                No tonsillar or pharyngeal exudates.  Lymph Nodes: No palpable lymphadenopathy  Neck: No JVD. No carotid bruit.   Respiratory: Normal chest expansion                         Normal percussion                         Normal and equal air entry                         No wheeze, ++rhonchi or rales.  Cardiovascular: S1 S2 normal. No murmurs, rubs or gallops.   Abdomen: Soft, non-tender, non-distended. No organomegaly. Normoactive bowel sounds.  Extremities: Warm to touch. Peripheral pulse palpable. No pedal edema.   Skin: No rashes or skin lesions  Neurological: Motor and sensory examination equal and normal in all four extremities.  Psychiatry: Appropriate mood and affect.    HOSPITAL MEDICATIONS:  MEDICATIONS  (STANDING):  albuterol/ipratropium for Nebulization 3 milliLiter(s) Nebulizer every 6 hours  apixaban 5 milliGRAM(s) Oral every 12 hours  buDESOnide    Inhalation Suspension 0.5 milliGRAM(s) Inhalation every 12 hours  chlorhexidine 2% Cloths 1 Application(s) Topical <User Schedule>  collagenase Ointment 1 Application(s) Topical daily  furosemide    Tablet 40 milliGRAM(s) Oral daily  insulin lispro (ADMELOG) corrective regimen sliding scale   SubCutaneous every 6 hours  insulin NPH human recombinant 15 Unit(s) SubCutaneous <User Schedule>  insulin NPH human recombinant 35 Unit(s) SubCutaneous <User Schedule>  insulin NPH human recombinant 25 Unit(s) SubCutaneous <User Schedule>  metoprolol tartrate 25 milliGRAM(s) Oral every 8 hours  mexiletine 200 milliGRAM(s) Oral every 8 hours  montelukast 10 milliGRAM(s) Oral at bedtime  multivitamin 1 Tablet(s) Oral daily  nystatin    Suspension 255914 Unit(s) Oral every 6 hours  pantoprazole  Injectable 40 milliGRAM(s) IV Push daily  predniSONE   Tablet 8 milliGRAM(s) Oral daily  QUEtiapine 25 milliGRAM(s) Oral at bedtime  spironolactone 25 milliGRAM(s) Oral every 12 hours    MEDICATIONS  (PRN):  acetaminophen    Suspension .. 675 milliGRAM(s) Oral every 6 hours PRN Moderate Pain (4 - 6)  albuterol/ipratropium for Nebulization 3 milliLiter(s) Nebulizer every 12 hours PRN Shortness of Breath and/or Wheezing      LABS:                        11.6   18.43 )-----------( 421      ( 23 Sep 2022 00:23 )             38.0     09-23    137  |  99  |  41<H>  ----------------------------<  37<LL>  4.6   |  24  |  0.84    Ca    9.9      23 Sep 2022 00:23  Phos  4.0     09-23  Mg     2.3     09-23    TPro  7.8  /  Alb  3.6  /  TBili  0.3  /  DBili  x   /  AST  52<H>  /  ALT  73<H>  /  AlkPhos  142<H>  09-23              MICROBIOLOGY:     RADIOLOGY:  [ ] Reviewed and interpreted by me    Point of Care Ultrasound Findings:    PFT:    EKG:

## 2022-09-23 NOTE — PROGRESS NOTE ADULT - TIME BILLING
as above: hypoglycemia rx-TF at 40cc as NGT in place, remains in PICU  multifactorial dyspnea-resp failure-severe persistent asthma, TBM s/p tracheoplasty, s/p Aortic aneurysm repair, Bronchitis (proteus)-O2 NC-keep 90%  severe persistent asthma--medrol 8mg, singulair 10, duoneb q 6, budes .5 bid, incentive spirometry, tezspire 8/29-next 9/29  TBM-s/p tracheoplasty--accapella, unable to use vest due to surgery  s/p Aortic aneurysm repair--as per [facility] staff care  AF-on heparin--eventual eliquis rx  DVT R-IJ-on heparin rx  ID-proteus bronchitis-s/p meropenum changed to ceftriaxone and back to meropenem/vanco-now off of ABX as of 9/19  PT-OOB as able           VC dysfunction--aspiration precautions-sp and sw evaln--NGT in place/TF; FEESST passed 9/20  GO                                    Heme onc f/up colon ca  prog--guarded in CTU/PICU                PT--OOB as much as possible and all neb rx is to be HHN and not FM (d/w nursing)    Eulalio Allison MD-Pulmonary   [phone]

## 2022-09-24 DIAGNOSIS — E78.5 HYPERLIPIDEMIA, UNSPECIFIED: ICD-10-CM

## 2022-09-24 LAB
ALBUMIN SERPL ELPH-MCNC: 3.8 G/DL — SIGNIFICANT CHANGE UP (ref 3.3–5)
ALBUMIN SERPL ELPH-MCNC: 3.9 G/DL — SIGNIFICANT CHANGE UP (ref 3.3–5)
ALP SERPL-CCNC: 158 U/L — HIGH (ref 40–120)
ALP SERPL-CCNC: 160 U/L — HIGH (ref 40–120)
ALT FLD-CCNC: 53 U/L — HIGH (ref 10–45)
ALT FLD-CCNC: 54 U/L — HIGH (ref 10–45)
ANION GAP SERPL CALC-SCNC: 11 MMOL/L — SIGNIFICANT CHANGE UP (ref 5–17)
ANION GAP SERPL CALC-SCNC: 13 MMOL/L — SIGNIFICANT CHANGE UP (ref 5–17)
AST SERPL-CCNC: 22 U/L — SIGNIFICANT CHANGE UP (ref 10–40)
AST SERPL-CCNC: 24 U/L — SIGNIFICANT CHANGE UP (ref 10–40)
BILIRUB SERPL-MCNC: 0.5 MG/DL — SIGNIFICANT CHANGE UP (ref 0.2–1.2)
BILIRUB SERPL-MCNC: 0.5 MG/DL — SIGNIFICANT CHANGE UP (ref 0.2–1.2)
BUN SERPL-MCNC: 42 MG/DL — HIGH (ref 7–23)
BUN SERPL-MCNC: 42 MG/DL — HIGH (ref 7–23)
CALCIUM SERPL-MCNC: 9.9 MG/DL — SIGNIFICANT CHANGE UP (ref 8.4–10.5)
CALCIUM SERPL-MCNC: 9.9 MG/DL — SIGNIFICANT CHANGE UP (ref 8.4–10.5)
CHLORIDE SERPL-SCNC: 95 MMOL/L — LOW (ref 96–108)
CHLORIDE SERPL-SCNC: 95 MMOL/L — LOW (ref 96–108)
CO2 SERPL-SCNC: 26 MMOL/L — SIGNIFICANT CHANGE UP (ref 22–31)
CO2 SERPL-SCNC: 28 MMOL/L — SIGNIFICANT CHANGE UP (ref 22–31)
CREAT SERPL-MCNC: 0.94 MG/DL — SIGNIFICANT CHANGE UP (ref 0.5–1.3)
CREAT SERPL-MCNC: 0.94 MG/DL — SIGNIFICANT CHANGE UP (ref 0.5–1.3)
EGFR: 64 ML/MIN/1.73M2 — SIGNIFICANT CHANGE UP
EGFR: 64 ML/MIN/1.73M2 — SIGNIFICANT CHANGE UP
GLUCOSE BLDC GLUCOMTR-MCNC: 176 MG/DL — HIGH (ref 70–99)
GLUCOSE BLDC GLUCOMTR-MCNC: 223 MG/DL — HIGH (ref 70–99)
GLUCOSE BLDC GLUCOMTR-MCNC: 236 MG/DL — HIGH (ref 70–99)
GLUCOSE BLDC GLUCOMTR-MCNC: 288 MG/DL — HIGH (ref 70–99)
GLUCOSE BLDC GLUCOMTR-MCNC: 291 MG/DL — HIGH (ref 70–99)
GLUCOSE BLDC GLUCOMTR-MCNC: 317 MG/DL — HIGH (ref 70–99)
GLUCOSE SERPL-MCNC: 240 MG/DL — HIGH (ref 70–99)
GLUCOSE SERPL-MCNC: 244 MG/DL — HIGH (ref 70–99)
HCT VFR BLD CALC: 40 % — SIGNIFICANT CHANGE UP (ref 34.5–45)
HGB BLD-MCNC: 12.1 G/DL — SIGNIFICANT CHANGE UP (ref 11.5–15.5)
MAGNESIUM SERPL-MCNC: 2.2 MG/DL — SIGNIFICANT CHANGE UP (ref 1.6–2.6)
MCHC RBC-ENTMCNC: 23.1 PG — LOW (ref 27–34)
MCHC RBC-ENTMCNC: 30.3 GM/DL — LOW (ref 32–36)
MCV RBC AUTO: 76.5 FL — LOW (ref 80–100)
NRBC # BLD: 0 /100 WBCS — SIGNIFICANT CHANGE UP (ref 0–0)
PHOSPHATE SERPL-MCNC: 4 MG/DL — SIGNIFICANT CHANGE UP (ref 2.5–4.5)
PLATELET # BLD AUTO: 411 K/UL — HIGH (ref 150–400)
POTASSIUM SERPL-MCNC: 5.4 MMOL/L — HIGH (ref 3.5–5.3)
POTASSIUM SERPL-MCNC: 5.6 MMOL/L — HIGH (ref 3.5–5.3)
POTASSIUM SERPL-SCNC: 5.4 MMOL/L — HIGH (ref 3.5–5.3)
POTASSIUM SERPL-SCNC: 5.6 MMOL/L — HIGH (ref 3.5–5.3)
PROT SERPL-MCNC: 8 G/DL — SIGNIFICANT CHANGE UP (ref 6–8.3)
PROT SERPL-MCNC: 8.1 G/DL — SIGNIFICANT CHANGE UP (ref 6–8.3)
RBC # BLD: 5.23 M/UL — HIGH (ref 3.8–5.2)
RBC # FLD: 26.7 % — HIGH (ref 10.3–14.5)
SODIUM SERPL-SCNC: 134 MMOL/L — LOW (ref 135–145)
SODIUM SERPL-SCNC: 134 MMOL/L — LOW (ref 135–145)
WBC # BLD: 15.63 K/UL — HIGH (ref 3.8–10.5)
WBC # FLD AUTO: 15.63 K/UL — HIGH (ref 3.8–10.5)

## 2022-09-24 PROCEDURE — 99232 SBSQ HOSP IP/OBS MODERATE 35: CPT

## 2022-09-24 RX ORDER — INSULIN LISPRO 100/ML
VIAL (ML) SUBCUTANEOUS
Refills: 0 | Status: DISCONTINUED | OUTPATIENT
Start: 2022-09-24 | End: 2022-09-27

## 2022-09-24 RX ORDER — INSULIN LISPRO 100/ML
4 VIAL (ML) SUBCUTANEOUS
Refills: 0 | Status: DISCONTINUED | OUTPATIENT
Start: 2022-09-24 | End: 2022-09-25

## 2022-09-24 RX ORDER — HUMAN INSULIN 100 [IU]/ML
10 INJECTION, SUSPENSION SUBCUTANEOUS
Refills: 0 | Status: DISCONTINUED | OUTPATIENT
Start: 2022-09-24 | End: 2022-09-25

## 2022-09-24 RX ADMIN — Medication 3 MILLILITER(S): at 16:58

## 2022-09-24 RX ADMIN — MEXILETINE HYDROCHLORIDE 200 MILLIGRAM(S): 150 CAPSULE ORAL at 05:37

## 2022-09-24 RX ADMIN — QUETIAPINE FUMARATE 25 MILLIGRAM(S): 200 TABLET, FILM COATED ORAL at 22:22

## 2022-09-24 RX ADMIN — Medication 6: at 12:06

## 2022-09-24 RX ADMIN — Medication 3 MILLILITER(S): at 05:38

## 2022-09-24 RX ADMIN — MEXILETINE HYDROCHLORIDE 200 MILLIGRAM(S): 150 CAPSULE ORAL at 22:22

## 2022-09-24 RX ADMIN — Medication 1 APPLICATION(S): at 11:16

## 2022-09-24 RX ADMIN — Medication 3 MILLILITER(S): at 23:02

## 2022-09-24 RX ADMIN — HUMAN INSULIN 10 UNIT(S): 100 INJECTION, SUSPENSION SUBCUTANEOUS at 20:48

## 2022-09-24 RX ADMIN — Medication 6: at 00:48

## 2022-09-24 RX ADMIN — Medication 3 MILLILITER(S): at 11:16

## 2022-09-24 RX ADMIN — Medication 8 MILLIGRAM(S): at 23:02

## 2022-09-24 RX ADMIN — Medication 40 MILLIGRAM(S): at 05:38

## 2022-09-24 RX ADMIN — Medication 1 TABLET(S): at 13:04

## 2022-09-24 RX ADMIN — Medication 500000 UNIT(S): at 06:21

## 2022-09-24 RX ADMIN — SODIUM CHLORIDE 3 MILLILITER(S): 9 INJECTION INTRAMUSCULAR; INTRAVENOUS; SUBCUTANEOUS at 13:12

## 2022-09-24 RX ADMIN — CHLORHEXIDINE GLUCONATE 1 APPLICATION(S): 213 SOLUTION TOPICAL at 13:00

## 2022-09-24 RX ADMIN — APIXABAN 5 MILLIGRAM(S): 2.5 TABLET, FILM COATED ORAL at 22:18

## 2022-09-24 RX ADMIN — MONTELUKAST 10 MILLIGRAM(S): 4 TABLET, CHEWABLE ORAL at 22:20

## 2022-09-24 RX ADMIN — Medication 8 MILLIGRAM(S): at 05:37

## 2022-09-24 RX ADMIN — Medication 25 MILLIGRAM(S): at 13:03

## 2022-09-24 RX ADMIN — SODIUM CHLORIDE 3 MILLILITER(S): 9 INJECTION INTRAMUSCULAR; INTRAVENOUS; SUBCUTANEOUS at 22:28

## 2022-09-24 RX ADMIN — Medication 8: at 16:58

## 2022-09-24 RX ADMIN — MEXILETINE HYDROCHLORIDE 200 MILLIGRAM(S): 150 CAPSULE ORAL at 13:03

## 2022-09-24 RX ADMIN — Medication 500000 UNIT(S): at 13:02

## 2022-09-24 RX ADMIN — SPIRONOLACTONE 25 MILLIGRAM(S): 25 TABLET, FILM COATED ORAL at 05:37

## 2022-09-24 RX ADMIN — Medication 0.5 MILLIGRAM(S): at 05:36

## 2022-09-24 RX ADMIN — APIXABAN 5 MILLIGRAM(S): 2.5 TABLET, FILM COATED ORAL at 00:31

## 2022-09-24 RX ADMIN — APIXABAN 5 MILLIGRAM(S): 2.5 TABLET, FILM COATED ORAL at 13:05

## 2022-09-24 RX ADMIN — PANTOPRAZOLE SODIUM 40 MILLIGRAM(S): 20 TABLET, DELAYED RELEASE ORAL at 11:18

## 2022-09-24 RX ADMIN — Medication 25 MILLIGRAM(S): at 05:37

## 2022-09-24 RX ADMIN — Medication 25 MILLIGRAM(S): at 22:21

## 2022-09-24 RX ADMIN — Medication 0.5 MILLIGRAM(S): at 16:58

## 2022-09-24 RX ADMIN — Medication 500000 UNIT(S): at 23:02

## 2022-09-24 RX ADMIN — Medication 500000 UNIT(S): at 16:58

## 2022-09-24 RX ADMIN — SODIUM CHLORIDE 3 MILLILITER(S): 9 INJECTION INTRAMUSCULAR; INTRAVENOUS; SUBCUTANEOUS at 06:21

## 2022-09-24 RX ADMIN — HUMAN INSULIN 10 UNIT(S): 100 INJECTION, SUSPENSION SUBCUTANEOUS at 00:37

## 2022-09-24 NOTE — CHART NOTE - NSCHARTNOTEFT_GEN_A_CORE
Nutrition Follow Up Note  Patient seen for: nutrition follow-up, calorie count results     Chart reviewed, events noted. This is a "71F w/h/o uncontrolled T2DM (A1C 9.4%) on basal/bolus insulin PTA. Unknown DM complications. Also COPD, secondary adrenal Insufficiency on chronic steroids, colorectal cancer s/p resection (colostomy bag), Chronic A fib on Eliquis, and tracheomalacia and multiple intubations, recent dx of OM presented to ED at [facility] with epigastric pain, belching and central chest pain. Found on CTA to have type A aortic dissection and transferred to [facility] for surgical evaluation by Dr. Cabrera.  POD# 16 Type A aortic dissection repair.  Pt had prolonged ICU stay." Pt s/p FEES , recommmended for "Regular solids/thin liquids". Remains on TFs for inadequate PO intake.     Source: [x] Patient       [x] EMR        [] RN        [] Family at bedside       [] Other:    -If unable to interview patient: [] Trach/Vent/BiPAP  [] Disoriented/confused/inappropriate to interview    Diet Order:   Diet, Consistent Carbohydrate/No Snacks:   DASH/TLC {Sodium & Cholesterol Restricted} (DASH)  Tube Feeding Modality: Nasogastric  Glucerna 1.5 Chago (GLUCERNA1.5RTH)  Total Volume for 24 Hours (mL): 400  Intermittent  Starting Tube Feed Rate {mL per Hour}: 40  Until Goal Tube Feed Rate (mL per Hour): 40  Tube Feeding Hours ON: 10  Tube Feeding OFF (Hours): 14  Tube Feed Start Time: 20:00  Supplement Feeding Modality:  Oral  Glucerna Shake Cans or Servings Per Day:  3       Frequency:  Daily (22)    Glucerna 1.5 @ 40ml/hr x 24hrs: 960ml total volume, 1440Kcal, 79gm protein 729ml free-water.     - Is current order appropriate/adequate? [x] Yes  []  No:     - PO intake :   [] >75%  Adequate    [] 50-75%  Fair       [] <50%  Poor    - Nutrition-related concerns:  -Pt seen this morning, reports fair intake, consumed a little bit of a banana and 3 Glucerna Shakes per pt report. Observed untouched eggs at bedside reports dislike of scrambled eggs, receptive to hard boiled. Obtained food preferences, will honor as able.   -Pt ordered for calorie count from -, unable to locate sheet (pt transferred from PICU to [facility] yesterday), difficulty to assess adequacy of PO intake. Recommend restarted calorie count to objectively assess adequacy of PO intake and ability to adjust/discontinue tube feeds.     GI:  colostomy output x 24hrs: 175ml (), no output noted since per nursing flow sheets .   Bowel Regimen? [] Yes   [x] No    Weights:   Daily Weight in k.5 (), Weight in k.6 (), Weight in k.9 (), Weight in k.3 (), Weight in k.9 (), Weight in k.9 (), Weight in k.3 ()  Dosing weight: 67.7Kg ()    Nutritionally Pertinent MEDICATIONS  (STANDING):  furosemide    Tablet  insulin lispro (ADMELOG) corrective regimen sliding scale  insulin NPH human recombinant  metoprolol tartrate  mexiletine  multivitamin  nystatin    Suspension  pantoprazole  Injectable  predniSONE   Tablet  sodium chloride 0.9% lock flush  spironolactone    Pertinent Labs:  @ 09:57: Na 134<L>, BUN 42<H>, Cr 0.94, <H>, K+ 5.4<H>, Phos 4.0, Mg 2.2, Alk Phos 160<H>, ALT/SGPT 53<H>, AST/SGOT 22, HbA1c --    A1C with Estimated Average Glucose Result: 9.4 % (22 @ 16:46)  A1C with Estimated Average Glucose Result: 9.2 % (22 @ 07:36)  A1C with Estimated Average Glucose Result: 8.0 % (05-10-22 @ 12:26)  A1C with Estimated Average Glucose Result: 9.5 % (22 @ 13:50)    Finger Sticks:  POCT Blood Glucose.: 288 mg/dL ( @ 11:25)  POCT Blood Glucose.: 176 mg/dL ( @ 07:06)  POCT Blood Glucose.: 291 mg/dL ( @ 00:16)  POCT Blood Glucose.: 269 mg/dL ( @ 18:06)  POCT Blood Glucose.: 273 mg/dL ( @ 12:36)      Skin per nursing documentation: free of pressure injuries per nursing flow sheets   Edema: none     Estimated Needs:   [x] no change since previous assessment  [] recalculated:   Nutritional needs: based on dosing wt 67.7kg   Estimated Energy Needs: 1897 - 2234kcal (28 - 33kcal/kg)  Estimated Protein Needs: 95 - 122g (1.4 - 1.8g/kg)  Estimated Fluid Needs: per team.    Previous Nutrition Diagnosis:   1) Inadequate Protein Energy Intake  2) Increased Nutrient Needs   Nutrition Diagnosis is: [x] ongoing  [] resolved [] not applicable     New Nutrition Diagnosis: [x] Not applicable    Nutrition Care Plan:  [x] In Progress  [] Achieved  [] Not applicable    Nutrition Interventions:     Education Provided:       [] Yes:  [] No:        Recommendations:      INCOMPLETE     [x] Continue current diet order as tolerated. Defer texture/consistency to team/SLP.     [x] Continue oral nutrition supplement: Glucerna TID      [x] Continue current micronutrient supplementation: multivitamin           Monitoring and Evaluation:   Continue to monitor nutritional intake, tolerance to diet prescription, weights, labs, skin integrity      RD remains available upon request and will follow up per protocol Nutrition Follow Up Note  Patient seen for: nutrition follow-up, calorie count results     Chart reviewed, events noted. This is a "71F w/h/o uncontrolled T2DM (A1C 9.4%) on basal/bolus insulin PTA. Unknown DM complications. Also COPD, secondary adrenal Insufficiency on chronic steroids, colorectal cancer s/p resection (colostomy bag), Chronic A fib on Eliquis, and tracheomalacia and multiple intubations, recent dx of OM presented to ED at [facility] with epigastric pain, belching and central chest pain. Found on CTA to have type A aortic dissection and transferred to [facility] for surgical evaluation by Dr. Cabrera. POD# 16 Type A aortic dissection repair.  Pt had prolonged ICU stay." Pt s/p FEES , recommmended for "Regular solids/thin liquids". Remains on TFs for inadequate PO intake.     Source: [x] Patient       [x] EMR        [x] Ryan         [] Family at bedside       [] Other:    -If unable to interview patient: [] Trach/Vent/BiPAP  [] Disoriented/confused/inappropriate to interview    Diet Order:   Diet, Consistent Carbohydrate/No Snacks:   DASH/TLC {Sodium & Cholesterol Restricted} (DASH)  Tube Feeding Modality: Nasogastric  Glucerna 1.5 Chago (GLUCERNA1.5RTH)  Total Volume for 24 Hours (mL): 400  Intermittent  Starting Tube Feed Rate {mL per Hour}: 40  Until Goal Tube Feed Rate (mL per Hour): 40  Tube Feeding Hours ON: 10  Tube Feeding OFF (Hours): 14  Tube Feed Start Time: 20:00  Supplement Feeding Modality:  Oral  Glucerna Shake Cans or Servings Per Day:  3       Frequency:  Daily (22)    Pt previously 24h-hr tube feeds of Glucerna 1.5 @ 40ml/hr x 24hrs: regimen provided 960ml total volume, 1440Kcal, 79gm protein 729ml free-water. Pt now switched to nocturnal feeds to assess if appetite improves. Current regimen provides Glucerna 1.5 @ 40ml/hr x 10hrs. Regimen provided 400ml total volume, 600Kcalm 33gm protein and 303ml free-water. Meets 33% low-end estimated needed nutrition needs and ~42% low-end estimated protein needs     - Is current order appropriate/adequate? [x] Yes  []  No:     - PO intake :   [] >75%  Adequate    [] 50-75%  Fair       [x] <50%  Poor    - Nutrition-related concerns:  -Pt seen this morning, reports fair intake, consumed a little bit of a banana and 3 Glucerna Shakes per pt report, however RN reports pt only took a few sips of Cranberry juice, refused to consume anything else despite maximum encouragement, pt noted with confusion at times. Observed untouched eggs at bedside during visit reports dislike, obtained food preferences, will honor as able.   -Pt ordered for calorie count from -, unable to locate sheet (pt transferred from PICU to [facility] yesterday), difficulty to assess adequacy of PO intake. Recommend restarting calorie count to objectively assess adequacy of PO intake and ability to adjust/discontinue tube feeds.     GI:  colostomy output x 24hrs: 175ml (), no output noted since per nursing flow sheets .   Bowel Regimen? [] Yes   [x] No    Weights:   Daily Weight in k.5 (), Weight in k.6 (), Weight in k.9 (), Weight in k.3 (), Weight in k.9 (), Weight in k.9 (), Weight in k.3 ()  Dosing weight: 67.7Kg ()    Nutritionally Pertinent MEDICATIONS  (STANDING):  furosemide    Tablet  insulin lispro (ADMELOG) corrective regimen sliding scale  insulin NPH human recombinant  metoprolol tartrate  mexiletine  multivitamin  nystatin    Suspension  pantoprazole  Injectable  predniSONE   Tablet  sodium chloride 0.9% lock flush  spironolactone    Pertinent Labs:  @ 09:57: Na 134<L>, BUN 42<H>, Cr 0.94, <H>, K+ 5.4<H>, Phos 4.0, Mg 2.2, Alk Phos 160<H>, ALT/SGPT 53<H>, AST/SGOT 22, HbA1c --    A1C with Estimated Average Glucose Result: 9.4 % (22 @ 16:46)  A1C with Estimated Average Glucose Result: 9.2 % (22 @ 07:36)  A1C with Estimated Average Glucose Result: 8.0 % (05-10-22 @ 12:26)  A1C with Estimated Average Glucose Result: 9.5 % (22 @ 13:50)    Finger Sticks:  POCT Blood Glucose.: 288 mg/dL ( @ 11:25)  POCT Blood Glucose.: 176 mg/dL ( @ 07:06)  POCT Blood Glucose.: 291 mg/dL ( @ 00:16)  POCT Blood Glucose.: 269 mg/dL ( @ 18:06)  POCT Blood Glucose.: 273 mg/dL ( @ 12:36)      Skin per nursing documentation: free of pressure injuries per nursing flow sheets   Edema: none     Estimated Needs:   [x] no change since previous assessment  [x] recalculated: based on most recent standing weight   Nutritional needs: based on dosing wt 65.5kg   Estimated Energy Needs: 1834 - 2096kcal (28 - 32kcal/kg)  Estimated Protein Needs: 78.6 - 105g (1.4 - 1.6g/kg)  Estimated Fluid Needs: per team.    Previous Nutrition Diagnosis:   1) Inadequate Protein Energy Intake  2) Increased Nutrient Needs   Nutrition Diagnosis is: [x] ongoing  [] resolved [] not applicable     New Nutrition Diagnosis: [x] Not applicable    Nutrition Care Plan:  [x] In Progress  [] Achieved  [] Not applicable    Nutrition Interventions:     Education Provided:       [] Yes:  [] No:        Recommendations:        [x] Consider changing to Glucerna 1.5 60ml/hr x10hrs to meet at least 50% of estimated needs via TFs given poor PO intake. Regimen would provide 600ml total volume, 900Kcal and 49.5gm protein.      [x] Continue current diet order as tolerated. Defer texture/consistency to team/SLP.      [x] Continue oral nutrition supplement: Glucerna TID      [x] Continue current micronutrient supplementation: multivitamin      [x]Continue calorie count to objectively assess adequacy of PO intake and ability to adjust/discontinue tube feeds. RN reports new sheet now hanging. RD to follow-up with results.          Monitoring and Evaluation:   Continue to monitor nutritional intake, tolerance to diet prescription, weights, labs, skin integrity      RD remains available upon request and will follow up per protocol  Tete Andrew RD, CDN, Pager # [phone]

## 2022-09-24 NOTE — PROGRESS NOTE ADULT - SUBJECTIVE AND OBJECTIVE BOX
VITAL SIGNS    Telemetry:    Vital Signs Last 24 Hrs  T(C): 36.4 (22 @ 05:00), Max: 36.8 (22 @ 19:19)  T(F): 97.5 (22 @ 05:00), Max: 98.2 (22 @ 19:19)  HR: 82 (22 @ 05:00) (82 - 90)  BP: 119/76 (22 @ 05:00) (106/74 - 119/76)  RR: 18 (22 @ 05:00) (18 - 18)  SpO2: 94% (22 @ 05:00) (94% - 94%)             @ 07:01  -   @ 07:00  --------------------------------------------------------  IN: 880 mL / OUT: 420 mL / NET: 460 mL       Daily     Daily Weight in k.5 (24 Sep 2022 07:52)  Admit Wt: Drug Dosing Weight  Height (cm): 170.2 (06 Sep 2022 15:50)  Weight (kg): 67.7 (07 Sep 2022 00:47)  BMI (kg/m2): 23.4 (07 Sep 2022 00:47)  BSA (m2): 1.79 (07 Sep 2022 00:47)    Bilirubin Total, Serum: 0.5 mg/dL ( @ 09:57)  Bilirubin Total, Serum: 0.5 mg/dL ( @ 09:57)    CAPILLARY BLOOD GLUCOSE      POCT Blood Glucose.: 288 mg/dL (24 Sep 2022 11:25)  POCT Blood Glucose.: 176 mg/dL (24 Sep 2022 07:06)  POCT Blood Glucose.: 291 mg/dL (24 Sep 2022 00:16)  POCT Blood Glucose.: 269 mg/dL (23 Sep 2022 18:06)  POCT Blood Glucose.: 273 mg/dL (23 Sep 2022 12:36)          acetaminophen    Suspension .. 675 milliGRAM(s) Oral every 6 hours PRN  albuterol/ipratropium for Nebulization 3 milliLiter(s) Nebulizer every 12 hours PRN  albuterol/ipratropium for Nebulization 3 milliLiter(s) Nebulizer every 6 hours  apixaban 5 milliGRAM(s) Oral every 12 hours  buDESOnide    Inhalation Suspension 0.5 milliGRAM(s) Inhalation every 12 hours  chlorhexidine 2% Cloths 1 Application(s) Topical <User Schedule>  collagenase Ointment 1 Application(s) Topical daily  furosemide    Tablet 40 milliGRAM(s) Oral daily  insulin lispro (ADMELOG) corrective regimen sliding scale   SubCutaneous every 6 hours  insulin NPH human recombinant 10 Unit(s) SubCutaneous <User Schedule>  metoprolol tartrate 25 milliGRAM(s) Oral every 8 hours  mexiletine 200 milliGRAM(s) Oral every 8 hours  montelukast 10 milliGRAM(s) Oral at bedtime  multivitamin 1 Tablet(s) Oral daily  nystatin    Suspension 772260 Unit(s) Oral every 6 hours  pantoprazole  Injectable 40 milliGRAM(s) IV Push daily  predniSONE   Tablet 8 milliGRAM(s) Oral daily  QUEtiapine 25 milliGRAM(s) Oral at bedtime  sodium chloride 0.9% lock flush 3 milliLiter(s) IV Push every 8 hours  spironolactone 25 milliGRAM(s) Oral every 12 hours      PHYSICAL EXAM    Subjective: "Hi.   Neurology: alert and oriented x 3, nonfocal, no gross deficits  CV : tele:  RSR  Sternal Wound :  CDI with dressing , Stable  Lungs: clear. RR easy, unlabored   Abdomen: soft, nontender, nondistended, positive bowel sounds, bowel movement   Neg N/V/D   :  pt voiding without difficulty   Extremities:   POOLE; edema, neg calf tenderness.   PPP bilaterally      PW:  Chest tubes:                 VITAL SIGNS    Telemetry:  RSR   Vital Signs Last 24 Hrs  T(C): 36.4 (22 @ 05:00), Max: 36.8 (22 @ 19:19)  T(F): 97.5 (22 @ 05:00), Max: 98.2 (22 @ 19:19)  HR: 82 (22 @ 05:00) (82 - 90)  BP: 119/76 (22 @ 05:00) (106/74 - 119/76)  RR: 18 (22 @ 05:00) (18 - 18)  SpO2: 94% (22 @ 05:00) (94% - 94%)             @ 07:01  -   @ 07:00  --------------------------------------------------------  IN: 880 mL / OUT: 420 mL / NET: 460 mL       Daily     Daily Weight in k.5 (24 Sep 2022 07:52)  Admit Wt: Drug Dosing Weight  Height (cm): 170.2 (06 Sep 2022 15:50)  Weight (kg): 67.7 (07 Sep 2022 00:47)  BMI (kg/m2): 23.4 (07 Sep 2022 00:47)  BSA (m2): 1.79 (07 Sep 2022 00:47)    Bilirubin Total, Serum: 0.5 mg/dL ( @ 09:57)  Bilirubin Total, Serum: 0.5 mg/dL ( @ 09:57)    CAPILLARY BLOOD GLUCOSE      POCT Blood Glucose.: 288 mg/dL (24 Sep 2022 11:25)  POCT Blood Glucose.: 176 mg/dL (24 Sep 2022 07:06)  POCT Blood Glucose.: 291 mg/dL (24 Sep 2022 00:16)  POCT Blood Glucose.: 269 mg/dL (23 Sep 2022 18:06)  POCT Blood Glucose.: 273 mg/dL (23 Sep 2022 12:36)          acetaminophen    Suspension .. 675 milliGRAM(s) Oral every 6 hours PRN  albuterol/ipratropium for Nebulization 3 milliLiter(s) Nebulizer every 12 hours PRN  albuterol/ipratropium for Nebulization 3 milliLiter(s) Nebulizer every 6 hours  apixaban 5 milliGRAM(s) Oral every 12 hours  buDESOnide    Inhalation Suspension 0.5 milliGRAM(s) Inhalation every 12 hours  chlorhexidine 2% Cloths 1 Application(s) Topical <User Schedule>  collagenase Ointment 1 Application(s) Topical daily  furosemide    Tablet 40 milliGRAM(s) Oral daily  insulin lispro (ADMELOG) corrective regimen sliding scale   SubCutaneous every 6 hours  insulin NPH human recombinant 10 Unit(s) SubCutaneous <User Schedule>  metoprolol tartrate 25 milliGRAM(s) Oral every 8 hours  mexiletine 200 milliGRAM(s) Oral every 8 hours  montelukast 10 milliGRAM(s) Oral at bedtime  multivitamin 1 Tablet(s) Oral daily  nystatin    Suspension 293339 Unit(s) Oral every 6 hours  pantoprazole  Injectable 40 milliGRAM(s) IV Push daily  predniSONE   Tablet 8 milliGRAM(s) Oral daily  QUEtiapine 25 milliGRAM(s) Oral at bedtime  sodium chloride 0.9% lock flush 3 milliLiter(s) IV Push every 8 hours  spironolactone 25 milliGRAM(s) Oral every 12 hours      PHYSICAL EXAM    Subjective: "I'm ok."  Neurology: alert and oriented x 3, nonfocal, no gross deficits  CV : tele:  RSR    Sternal Wound :  CDI BARRETT- sternum stable   Lungs: + rhonchi; RR easy, unlabored   Abdomen: soft, nontender, nondistended, positive bowel sounds, + colostomy;  Neg N/V/; + kaofeed TF   :  pt voiding without difficulty   Extremities:   POOLE; trace LE edema, neg calf tenderness.   PPP bilaterally; rt foot dressing     PW: no  Chest tubes: none

## 2022-09-24 NOTE — PROGRESS NOTE ADULT - ASSESSMENT
71F w/h/o uncontrolled T2DM (A1C 9.4%) on basal/bolus insulin PTA. Unknown DM complications. Also COPD, secondary adrenal Insufficiency on chronic steroids, colorectal cancer s/p resection (colostomy bag), Chronic A fib on Eliquis, and tracheomalacia and multiple intubations, recent dx of OM presented to ED at [facility] with epigastric pain, belching and central chest pain. Found on CTA to have type A aortic dissection and transferred to [facility] for surgical evaluation by Dr. Cabrera. Now s/p aortic dissection repair on 9/07/22. Endocrine consulted for assistance with transition from insulin drip to subcutaneous insulin while on TFs.       Uncontrolled type 2 diabetes mellitus with hyperglycemia, with long-term current use of insulin.   home medications: Lantus 35 units sq qhs, novolog TID (based on carb count usually around 10 units)  while inpt: Now changing to nocturnal feeds to monitor daytime po intake on calorie count, pt w/ poor po intake so far  9/24 diet changed to  Glucerna TF from 20:00 to 06:00 + CHO w/ supplement diet during day, on calorie count  - BG Goal 100-180mg/dl   - FS TID AC qHS & 2AM  - Change NPH to 10 units at 20:00 , hold if TF are held   - Will add premeal insulin once pt eating consistently based on calorie count, ensure CHO diet  - monitor on MDSS TID AC qHS for now  - change prednisone timing to MN as NPH peaking at 4-12 hours to help improve AM hyperglycemia  - Please contact endo team with any changes on TF/PO/Steroid status  Discharge plan:  - Likely to discharge patient home on basal/bolus insulin. Final regimen pending clinical course.  - Recommend routine outpatient ophthalmology, podiatry  - Can f/u with endocrinologist Dr. Saavedra.  - Make sure pt has Rx for all DM supplies and insulin/ DM meds.  -Based on pt's clinical condition and mental status at this time she is not able to independently manage her DM. Will evaluate pt's ability to manage DM at time of discharge. If unable> pt will need family/ care giver (s) to manage DM care at home  -Might need rehab.     Problem/Plan - 2:  ·  Problem: Hypertension.   ·  Plan: -BP goal >130/80  - Management per primary team.     Problem/Plan - 3:  ·  Problem: Hyperlipidemia.   ·  Plan: - C/w rosuvastatin 5mg daily  -F/u levels as out pt.     Problem/Plan - 4:  ·  Problem: Adrenal insufficiency.   ·  Plan: On chronic steroids at home: medrol 8mg qd   -C/w prednisone 8mg po qd per primary team  -Do not discontinue steroids abruptly , if pt made npo or refuses prednisone will need IV steroid dose  if need to transition to IV would order methylprednisone 6 mg IV qd if stable as d/w Dr Stevenson on 9/15  -Contact endo with any questions.      Discussed with patient and primary  team Makenzie NP & RN   Contact via [employer] Teams during business hours  On evenings and weekends, please call [phone] or page endocrine fellow on call.   Email: [email or web address]   Please note that this patient may be followed by different provider tomorrow.    greater than 50% of the encounter was spent counseling and/or coordination of care.  34 minutes spent on total encounter; The necessity of the time spent during the encounter on this date of service was due to development of plan of care/coordination of care/glycemic control through review of labs, blood glucose values and vital signs.

## 2022-09-24 NOTE — PROGRESS NOTE ADULT - PROBLEM SELECTOR PLAN 2
endo following   hypoglycemic-- premeal reduced 15/10/10 endo following   NPH decreased 10 units 2000 daily - tf changed to noctural tf

## 2022-09-24 NOTE — PROGRESS NOTE ADULT - SUBJECTIVE AND OBJECTIVE BOX
seen earlier today     Chief Complaint: Type 2 Diabetes Mellitus     INTERVAL HX: changing to nocturnal feedings to promote daytime PO intake now starting calorie count, seen w/ NP And RN at bedside, only had cranberry juice this am, pt asking for yogurt/peaches/glucerna. d/w team as pt previously eval by S&S on previous unit as pt w/ hx of coughing when eating. pt didnt eat at dinner & staff held NPH dose per d/w endocrine yesterday?  however was receiving TF at that time w/ MN BG above goal, . received MN NPH w/ improvement in BG . TF off since this AM now      Review of Systems:  General: As above  Cardiovascular: No chest pain  Respiratory: No SOB  GI: No nausea, vomiting  Endocrine: no  S&Sx of hypoglycemia    Allergies    aspirin (Short breath)  Avelox (Short breath; Pruritus)  cefepime (Anaphylaxis)  codeine (Short breath)  Dilaudid (Short breath)  iodine (Short breath; Swelling)  penicillin (Anaphylaxis)  shellfish (Anaphylaxis)  tetanus toxoid (Short breath)  Valium (Short breath)    Intolerances      MEDICATIONS  (STANDING):  albuterol/ipratropium for Nebulization 3 milliLiter(s) Nebulizer every 6 hours  apixaban 5 milliGRAM(s) Oral every 12 hours  buDESOnide    Inhalation Suspension 0.5 milliGRAM(s) Inhalation every 12 hours  chlorhexidine 2% Cloths 1 Application(s) Topical <User Schedule>  collagenase Ointment 1 Application(s) Topical daily  furosemide    Tablet 40 milliGRAM(s) Oral daily  insulin lispro (ADMELOG) corrective regimen sliding scale   SubCutaneous every 6 hours  insulin NPH human recombinant 10 Unit(s) SubCutaneous <User Schedule>  metoprolol tartrate 25 milliGRAM(s) Oral every 8 hours  mexiletine 200 milliGRAM(s) Oral every 8 hours  montelukast 10 milliGRAM(s) Oral at bedtime  multivitamin 1 Tablet(s) Oral daily  nystatin    Suspension 414699 Unit(s) Oral every 6 hours  pantoprazole  Injectable 40 milliGRAM(s) IV Push daily  predniSONE   Tablet 8 milliGRAM(s) Oral daily  QUEtiapine 25 milliGRAM(s) Oral at bedtime  sodium chloride 0.9% lock flush 3 milliLiter(s) IV Push every 8 hours        insulin lispro (ADMELOG) corrective regimen sliding scale   6 Unit(s) SubCutaneous (09-24-22 @ 12:06)   2  SubCutaneous (09-24-22 @ 07:33)   6  SubCutaneous (09-24-22 @ 00:48)   6 Unit(s) SubCutaneous (09-23-22 @ 18:09)    insulin NPH human recombinant   10 Unit(s) SubCutaneous (09-24-22 @ 00:37)    predniSONE   Tablet   8 milliGRAM(s) Oral (09-24-22 @ 05:37)        PHYSICAL EXAM:  VITALS: T(C): 36.7 (09-24-22 @ 13:52)  T(F): 98.1 (09-24-22 @ 13:52), Max: 98.2 (09-23-22 @ 19:19)  HR: 83 (09-24-22 @ 13:52) (82 - 90)  BP: 102/69 (09-24-22 @ 13:52) (102/69 - 119/76)  RR:  (18 - 18)  SpO2:  (94% - 100%)  Wt(kg): --  GENERAL: female sitting in chair in NAD , NGT   Respiratory: Respirations unlabored   Extremities: Warm, no edema  NEURO: A&OX3       LABS:    POCT Blood Glucose.: 288 mg/dL (09-24-22 @ 11:25)  POCT Blood Glucose.: 176 mg/dL (09-24-22 @ 07:06)  POCT Blood Glucose.: 291 mg/dL (09-24-22 @ 00:16)  POCT Blood Glucose.: 269 mg/dL (09-23-22 @ 18:06)  POCT Blood Glucose.: 273 mg/dL (09-23-22 @ 12:36)  POCT Blood Glucose.: 170 mg/dL (09-23-22 @ 09:13)  POCT Blood Glucose.: 75 mg/dL (09-23-22 @ 05:05)  POCT Blood Glucose.: 108 mg/dL (09-23-22 @ 02:08)  POCT Blood Glucose.: 230 mg/dL (09-23-22 @ 00:01)  POCT Blood Glucose.: 51 mg/dL (09-22-22 @ 23:36)  POCT Blood Glucose.: 42 mg/dL (09-22-22 @ 23:33)  POCT Blood Glucose.: 125 mg/dL (09-22-22 @ 17:21)  POCT Blood Glucose.: 198 mg/dL (09-22-22 @ 11:29)  POCT Blood Glucose.: 154 mg/dL (09-22-22 @ 06:05)  POCT Blood Glucose.: 201 mg/dL (09-22-22 @ 05:34)  POCT Blood Glucose.: 61 mg/dL (09-22-22 @ 05:11)  POCT Blood Glucose.: 108 mg/dL (09-21-22 @ 23:06)  POCT Blood Glucose.: 326 mg/dL (09-21-22 @ 17:44)                            12.1   15.63 )-----------( 411      ( 24 Sep 2022 09:57 )             40.0       09-24    134<L>  |  95<L>  |  42<H>  ----------------------------<  240<H>  5.4<H>   |  28  |  0.94    Ca    9.9      24 Sep 2022 09:57  Phos  4.0     09-24  Mg     2.2     09-24    TPro  8.0  /  Alb  3.9  /  TBili  0.5  /  DBili  x   /  AST  22  /  ALT  53<H>  /  AlkPhos  160<H>  09-24      eGFR: 64 mL/min/1.73m2 (24 Sep 2022 09:57)  eGFR: 64 mL/min/1.73m2 (24 Sep 2022 09:57)          Thyroid Function Tests:  09-06 @ 16:46 TSH 3.30 FreeT4 -- T3 -- Anti TPO -- Anti Thyroglobulin Ab -- TSI --          A1C with Estimated Average Glucose Result: 9.4 % (09-06-22 @ 16:46)  A1C with Estimated Average Glucose Result: 9.2 % (07-11-22 @ 07:36)      Estimated Average Glucose: 223 mg/dL (09-06-22 @ 16:46)  Estimated Average Glucose: 217 mg/dL (07-11-22 @ 07:36)

## 2022-09-24 NOTE — PROGRESS NOTE ADULT - PROBLEM SELECTOR PLAN 6
currently aflutter  on eliquis pafib  continue eliquis 5 bid for ac  continue lop 25 po bid for hr control  monitor and supplement electrolytes

## 2022-09-24 NOTE — PROGRESS NOTE ADULT - ASSESSMENT
71F w/h/o uncontrolled T2DM (A1C 9.4%) on basal/bolus insulin PTA. Unknown DM complications. Also COPD, secondary adrenal Insufficiency on chronic steroids, colorectal cancer s/p resection (colostomy bag), Chronic A fib on Eliquis, and tracheomalacia and multiple intubations, recent dx of OM presented to ED at [facility] with epigastric pain, belching and central chest pain. Found on CTA to have type A aortic dissection and transferred to [facility] for surgical evaluation by Dr. Cabrera.  POD# 16 Type A aortic dissection repair.  Pt had prolonged ICU stay.  Required reintubation.  Hx of asthma -Dr Allison following.  Adrenal insufficiency on long term prednisone --blood sugars have been labile--premeal reduced. Treated with iv antibiotics for osteomyelitis--podiatry signed off.  Passed FEEST.  On oral however still on TF for poor intake. Hx afib on eliquis currently aflutter 71F w/h/o uncontrolled T2DM (A1C 9.4%) on basal/bolus insulin PTA. Unknown DM complications. Also COPD, secondary adrenal Insufficiency on chronic steroids, colorectal cancer s/p resection (colostomy bag), Chronic A fib on Eliquis, and tracheomalacia and multiple intubations, recent dx of OM presented to ED at [facility] with epigastric pain, belching and central chest pain. Found on CTA to have type A aortic dissection and transferred to [facility] for surgical evaluation by Dr. Cabrera.  POD# 16 Type A aortic dissection repair.  Pt had prolonged ICU stay.  Required reintubation.  Hx of asthma -Dr Allison following.  Adrenal insufficiency on long term prednisone --blood sugars have been labile--premeal reduced. Treated with iv antibiotics for osteomyelitis--podiatry signed off.  Passed FEEST.  On oral however still on TF for poor intake. Hx afib on eliquis currently aflutter  9/24 VSS; pafib  on eliquis bid; continue TF--> change to noctural TF; nph adjusted by endo; jhonatan count; continue diuresis; + hyperkalemia- K  5.4- aldactone d/c   discharge planning- rehab next week

## 2022-09-24 NOTE — PROGRESS NOTE ADULT - PROBLEM SELECTOR PLAN 1
blood pressure control  Physical therapy  incentive spirometry  cough pillow  PT recs Rehab s/p 9/7 Type A aortic dissection repair / bentall   continue eliquis 5 bid for ac  continue lopressor 25 bid/ mexitil 200 q8  hyperkalemia- K  5.4- aldactone d/c   diuresis for hypervolemia   increase activity as tolerated  pulm toilet  pain management  noctural tf/ jhonatan count  discharge planning- rehab next week

## 2022-09-25 DIAGNOSIS — E11.65 TYPE 2 DIABETES MELLITUS WITH HYPERGLYCEMIA: ICD-10-CM

## 2022-09-25 LAB
ANION GAP SERPL CALC-SCNC: 12 MMOL/L — SIGNIFICANT CHANGE UP (ref 5–17)
BUN SERPL-MCNC: 33 MG/DL — HIGH (ref 7–23)
CALCIUM SERPL-MCNC: 9.7 MG/DL — SIGNIFICANT CHANGE UP (ref 8.4–10.5)
CHLORIDE SERPL-SCNC: 96 MMOL/L — SIGNIFICANT CHANGE UP (ref 96–108)
CO2 SERPL-SCNC: 24 MMOL/L — SIGNIFICANT CHANGE UP (ref 22–31)
CREAT SERPL-MCNC: 0.78 MG/DL — SIGNIFICANT CHANGE UP (ref 0.5–1.3)
EGFR: 80 ML/MIN/1.73M2 — SIGNIFICANT CHANGE UP
GLUCOSE BLDC GLUCOMTR-MCNC: 104 MG/DL — HIGH (ref 70–99)
GLUCOSE BLDC GLUCOMTR-MCNC: 110 MG/DL — HIGH (ref 70–99)
GLUCOSE BLDC GLUCOMTR-MCNC: 122 MG/DL — HIGH (ref 70–99)
GLUCOSE BLDC GLUCOMTR-MCNC: 144 MG/DL — HIGH (ref 70–99)
GLUCOSE BLDC GLUCOMTR-MCNC: 179 MG/DL — HIGH (ref 70–99)
GLUCOSE BLDC GLUCOMTR-MCNC: 296 MG/DL — HIGH (ref 70–99)
GLUCOSE BLDC GLUCOMTR-MCNC: 314 MG/DL — HIGH (ref 70–99)
GLUCOSE BLDC GLUCOMTR-MCNC: 317 MG/DL — HIGH (ref 70–99)
GLUCOSE BLDC GLUCOMTR-MCNC: 321 MG/DL — HIGH (ref 70–99)
GLUCOSE BLDC GLUCOMTR-MCNC: 331 MG/DL — HIGH (ref 70–99)
GLUCOSE BLDC GLUCOMTR-MCNC: 337 MG/DL — HIGH (ref 70–99)
GLUCOSE BLDC GLUCOMTR-MCNC: 342 MG/DL — HIGH (ref 70–99)
GLUCOSE BLDC GLUCOMTR-MCNC: 358 MG/DL — HIGH (ref 70–99)
GLUCOSE SERPL-MCNC: 373 MG/DL — HIGH (ref 70–99)
HCT VFR BLD CALC: 38.8 % — SIGNIFICANT CHANGE UP (ref 34.5–45)
HGB BLD-MCNC: 11.3 G/DL — LOW (ref 11.5–15.5)
MCHC RBC-ENTMCNC: 22.7 PG — LOW (ref 27–34)
MCHC RBC-ENTMCNC: 29.1 GM/DL — LOW (ref 32–36)
MCV RBC AUTO: 78.1 FL — LOW (ref 80–100)
NRBC # BLD: 0 /100 WBCS — SIGNIFICANT CHANGE UP (ref 0–0)
PLATELET # BLD AUTO: 375 K/UL — SIGNIFICANT CHANGE UP (ref 150–400)
POTASSIUM SERPL-MCNC: 5.2 MMOL/L — SIGNIFICANT CHANGE UP (ref 3.5–5.3)
POTASSIUM SERPL-SCNC: 5.2 MMOL/L — SIGNIFICANT CHANGE UP (ref 3.5–5.3)
RBC # BLD: 4.97 M/UL — SIGNIFICANT CHANGE UP (ref 3.8–5.2)
RBC # FLD: 26.6 % — HIGH (ref 10.3–14.5)
SODIUM SERPL-SCNC: 132 MMOL/L — LOW (ref 135–145)
WBC # BLD: 14.36 K/UL — HIGH (ref 3.8–10.5)
WBC # FLD AUTO: 14.36 K/UL — HIGH (ref 3.8–10.5)

## 2022-09-25 PROCEDURE — 99232 SBSQ HOSP IP/OBS MODERATE 35: CPT

## 2022-09-25 PROCEDURE — 99233 SBSQ HOSP IP/OBS HIGH 50: CPT

## 2022-09-25 RX ORDER — INSULIN LISPRO 100/ML
2 VIAL (ML) SUBCUTANEOUS ONCE
Refills: 0 | Status: DISCONTINUED | OUTPATIENT
Start: 2022-09-25 | End: 2022-09-25

## 2022-09-25 RX ORDER — DEXTROSE 50 % IN WATER 50 %
50 SYRINGE (ML) INTRAVENOUS
Refills: 0 | Status: DISCONTINUED | OUTPATIENT
Start: 2022-09-25 | End: 2022-09-29

## 2022-09-25 RX ORDER — INSULIN HUMAN 100 [IU]/ML
6 INJECTION, SOLUTION SUBCUTANEOUS
Qty: 100 | Refills: 0 | Status: DISCONTINUED | OUTPATIENT
Start: 2022-09-25 | End: 2022-09-26

## 2022-09-25 RX ORDER — INSULIN LISPRO 100/ML
8 VIAL (ML) SUBCUTANEOUS ONCE
Refills: 0 | Status: COMPLETED | OUTPATIENT
Start: 2022-09-25 | End: 2022-09-25

## 2022-09-25 RX ORDER — HUMAN INSULIN 100 [IU]/ML
20 INJECTION, SUSPENSION SUBCUTANEOUS
Refills: 0 | Status: DISCONTINUED | OUTPATIENT
Start: 2022-09-25 | End: 2022-09-26

## 2022-09-25 RX ORDER — INSULIN LISPRO 100/ML
4 VIAL (ML) SUBCUTANEOUS ONCE
Refills: 0 | Status: COMPLETED | OUTPATIENT
Start: 2022-09-25 | End: 2022-09-25

## 2022-09-25 RX ORDER — INSULIN LISPRO 100/ML
8 VIAL (ML) SUBCUTANEOUS
Refills: 0 | Status: DISCONTINUED | OUTPATIENT
Start: 2022-09-25 | End: 2022-09-26

## 2022-09-25 RX ADMIN — Medication 500000 UNIT(S): at 17:17

## 2022-09-25 RX ADMIN — CHLORHEXIDINE GLUCONATE 1 APPLICATION(S): 213 SOLUTION TOPICAL at 05:20

## 2022-09-25 RX ADMIN — Medication 40 MILLIGRAM(S): at 05:13

## 2022-09-25 RX ADMIN — Medication 8: at 07:58

## 2022-09-25 RX ADMIN — Medication 675 MILLIGRAM(S): at 10:30

## 2022-09-25 RX ADMIN — SODIUM CHLORIDE 3 MILLILITER(S): 9 INJECTION INTRAMUSCULAR; INTRAVENOUS; SUBCUTANEOUS at 13:25

## 2022-09-25 RX ADMIN — MONTELUKAST 10 MILLIGRAM(S): 4 TABLET, CHEWABLE ORAL at 21:01

## 2022-09-25 RX ADMIN — Medication 500000 UNIT(S): at 05:25

## 2022-09-25 RX ADMIN — Medication 675 MILLIGRAM(S): at 22:02

## 2022-09-25 RX ADMIN — Medication 1 APPLICATION(S): at 09:46

## 2022-09-25 RX ADMIN — PANTOPRAZOLE SODIUM 40 MILLIGRAM(S): 20 TABLET, DELAYED RELEASE ORAL at 09:47

## 2022-09-25 RX ADMIN — Medication 25 MILLIGRAM(S): at 14:40

## 2022-09-25 RX ADMIN — Medication 4 UNIT(S): at 07:59

## 2022-09-25 RX ADMIN — Medication 3 MILLILITER(S): at 11:10

## 2022-09-25 RX ADMIN — Medication 675 MILLIGRAM(S): at 09:48

## 2022-09-25 RX ADMIN — MEXILETINE HYDROCHLORIDE 200 MILLIGRAM(S): 150 CAPSULE ORAL at 05:13

## 2022-09-25 RX ADMIN — Medication 25 MILLIGRAM(S): at 21:01

## 2022-09-25 RX ADMIN — Medication 4 UNIT(S): at 05:10

## 2022-09-25 RX ADMIN — Medication 675 MILLIGRAM(S): at 21:02

## 2022-09-25 RX ADMIN — HUMAN INSULIN 20 UNIT(S): 100 INJECTION, SUSPENSION SUBCUTANEOUS at 20:54

## 2022-09-25 RX ADMIN — Medication 0.5 MILLIGRAM(S): at 17:16

## 2022-09-25 RX ADMIN — APIXABAN 5 MILLIGRAM(S): 2.5 TABLET, FILM COATED ORAL at 09:46

## 2022-09-25 RX ADMIN — Medication 25 MILLIGRAM(S): at 05:14

## 2022-09-25 RX ADMIN — Medication 1 TABLET(S): at 09:46

## 2022-09-25 RX ADMIN — Medication 8 UNIT(S): at 08:37

## 2022-09-25 RX ADMIN — SODIUM CHLORIDE 3 MILLILITER(S): 9 INJECTION INTRAMUSCULAR; INTRAVENOUS; SUBCUTANEOUS at 05:01

## 2022-09-25 RX ADMIN — Medication 6: at 02:45

## 2022-09-25 RX ADMIN — Medication 500000 UNIT(S): at 14:00

## 2022-09-25 RX ADMIN — MEXILETINE HYDROCHLORIDE 200 MILLIGRAM(S): 150 CAPSULE ORAL at 14:40

## 2022-09-25 RX ADMIN — QUETIAPINE FUMARATE 25 MILLIGRAM(S): 200 TABLET, FILM COATED ORAL at 21:00

## 2022-09-25 RX ADMIN — SODIUM CHLORIDE 3 MILLILITER(S): 9 INJECTION INTRAMUSCULAR; INTRAVENOUS; SUBCUTANEOUS at 21:14

## 2022-09-25 RX ADMIN — Medication 3 MILLILITER(S): at 05:14

## 2022-09-25 RX ADMIN — APIXABAN 5 MILLIGRAM(S): 2.5 TABLET, FILM COATED ORAL at 21:01

## 2022-09-25 RX ADMIN — Medication 0.5 MILLIGRAM(S): at 05:14

## 2022-09-25 RX ADMIN — MEXILETINE HYDROCHLORIDE 200 MILLIGRAM(S): 150 CAPSULE ORAL at 21:00

## 2022-09-25 RX ADMIN — Medication 3 MILLILITER(S): at 17:16

## 2022-09-25 NOTE — PROGRESS NOTE ADULT - PROBLEM SELECTOR PLAN 6
pafib  continue eliquis 5 bid for ac  continue lop 25 po bid for hr control  monitor and supplement electrolytes

## 2022-09-25 NOTE — PROGRESS NOTE ADULT - PROBLEM SELECTOR PLAN 3
C/w rosuvastatin 5mg daily  -F/u levels as out pt.      discussed w/pt and CT surgery PA  Can be reached via TEAMS/pager: [phone]   office:  [phone] (M-F 9a-5pm)               [phone] (nights/weekends)   [email or web address] password [email or web address]

## 2022-09-25 NOTE — PROGRESS NOTE ADULT - PROBLEM SELECTOR PLAN 1
-test BG hourly while on insulin gtt. Once off gtt can check AC/HS/2AM daily  -Insulin infusion per protocol. Recommend discontinue gtt once BG less than 150mg/dl given adjustments to insulin doses today.   -Increase NPH 20 units 8pm daily  -Increase Admelog 8 units TID w/meals (Hold if not eating)  -c/w Admelog moderate correction scale AC and mod HS/2AM scales.   -cons carb diet-notify endocrine for any change to diet/TF regimen. On calorie count  -Recommend move Prednisone 8mg back to 6am daily/Physiologic time for steroids.   Discharge plan:  - Likely to discharge patient home on basal/bolus insulin. Final regimen pending clinical course.  - Recommend routine outpatient ophthalmology, podiatry  - Can f/u with endocrinologist Dr. Saavedra.  - Make sure pt has Rx for all DM supplies and insulin/ DM meds.  -Based on pt's clinical condition and mental status at this time she is not able to independently manage her DM. Will evaluate pt's ability to manage DM at time of discharge. If unable> pt will need family/ care giver (s) to manage DM care at home  -Might need rehab.

## 2022-09-25 NOTE — PROGRESS NOTE ADULT - SUBJECTIVE AND OBJECTIVE BOX
[facility] - Division of Pulmonary, Critical Care and Sleep Medicine   Please call [phone] between 8am-pm weekdays, [phone] after hours and weekends    Interval Events: No events overnight     REVIEW OF SYSTEMS:  CV: [ ] chest pain   Resp: [ ] cough [ ] shortness of breath   [ ] All other systems negative  [ ] Unable to assess ROS because ________    OBJECTIVE:  ICU Vital Signs Last 24 Hrs  T(C): 36.6 (25 Sep 2022 04:05), Max: 36.8 (25 Sep 2022 00:04)  T(F): 97.9 (25 Sep 2022 04:05), Max: 98.2 (25 Sep 2022 00:04)  HR: 93 (25 Sep 2022 04:05) (64 - 99)  BP: 112/78 (25 Sep 2022 04:05) (102/69 - 125/78)  BP(mean): --  ABP: --  ABP(mean): --  RR: 18 (25 Sep 2022 04:05) (16 - 18)  SpO2: 95% (25 Sep 2022 04:05) (95% - 100%)    O2 Parameters below as of 25 Sep 2022 04:05  Patient On (Oxygen Delivery Method): room air              09-24 @ 07:01  -  09-25 @ 07:00  --------------------------------------------------------  IN: 0 mL / OUT: 800 mL / NET: -800 mL      CAPILLARY BLOOD GLUCOSE      POCT Blood Glucose.: 317 mg/dL (25 Sep 2022 07:23)      PHYSICAL EXAM:  General: NAD  HEENT: NC/AT  Lymph Nodes: no cervical or supraclavicular lymphadenopathy  Neck: supple  Respiratory:  CTA b/l, no wheezes, crackles or rhonchi  Cardiovascular:  RRR, no m/r/g  Abdomen: soft, NT/ND, +BS  Extremities: no clubbing, cyanosis or edema, warm  Skin: no rash  Neurological: AAOx3, non focal exam  Psychiatry: not anxious appearing, normal affect and mood    HOSPITAL MEDICATIONS:  MEDICATIONS  (STANDING):  albuterol/ipratropium for Nebulization 3 milliLiter(s) Nebulizer every 6 hours  apixaban 5 milliGRAM(s) Oral every 12 hours  buDESOnide    Inhalation Suspension 0.5 milliGRAM(s) Inhalation every 12 hours  chlorhexidine 2% Cloths 1 Application(s) Topical <User Schedule>  collagenase Ointment 1 Application(s) Topical daily  furosemide    Tablet 40 milliGRAM(s) Oral daily  insulin lispro (ADMELOG) corrective regimen sliding scale   SubCutaneous three times a day before meals  insulin lispro (ADMELOG) corrective regimen sliding scale   SubCutaneous <User Schedule>  insulin lispro Injectable (ADMELOG) 4 Unit(s) SubCutaneous three times a day before meals  insulin NPH human recombinant 10 Unit(s) SubCutaneous <User Schedule>  metoprolol tartrate 25 milliGRAM(s) Oral every 8 hours  mexiletine 200 milliGRAM(s) Oral every 8 hours  montelukast 10 milliGRAM(s) Oral at bedtime  multivitamin 1 Tablet(s) Oral daily  nystatin    Suspension 051498 Unit(s) Oral every 6 hours  pantoprazole  Injectable 40 milliGRAM(s) IV Push daily  predniSONE   Tablet 8 milliGRAM(s) Oral daily  QUEtiapine 25 milliGRAM(s) Oral at bedtime  sodium chloride 0.9% lock flush 3 milliLiter(s) IV Push every 8 hours    MEDICATIONS  (PRN):  acetaminophen    Suspension .. 675 milliGRAM(s) Oral every 6 hours PRN Moderate Pain (4 - 6)  albuterol/ipratropium for Nebulization 3 milliLiter(s) Nebulizer every 12 hours PRN Shortness of Breath and/or Wheezing      LABS:                        11.3   14.36 )-----------( 375      ( 25 Sep 2022 05:04 )             38.8     Hgb Trend: 11.3<--, 12.1<--, 11.6<--, 11.5<--, 11.4<--  09-25    132<L>  |  96  |  33<H>  ----------------------------<  373<H>  5.2   |  24  |  0.78    Ca    9.7      25 Sep 2022 05:04  Phos  4.0     09-24  Mg     2.2     09-24    TPro  8.0  /  Alb  3.9  /  TBili  0.5  /  DBili  x   /  AST  22  /  ALT  53<H>  /  AlkPhos  160<H>  09-24    Creatinine Trend: 0.78<--, 0.94<--, 0.84<--, 0.70<--, 0.72<--, 0.73<--            MICROBIOLOGY:     RADIOLOGY:  [ ] Reviewed and interpreted by me

## 2022-09-25 NOTE — PROGRESS NOTE ADULT - PROBLEM SELECTOR PLAN 2
On chronic steroids at home: medrol 8mg qd   -C/w prednisone 8mg po qd per primary team  -Do not discontinue steroids abruptly , if pt made npo or refuses prednisone will need IV steroid dose  if need to transition to IV would order methylprednisone 6 mg IV qd if stable as d/w Dr Stevenson on 9/15  -Contact endo with any questions.

## 2022-09-25 NOTE — PROGRESS NOTE ADULT - PROBLEM SELECTOR PLAN 1
s/p 9/7 Type A aortic dissection repair / bentall   Continue Eliquis 5 bid for ac  continue lopressor 25 bid/ mexitil 200 q8  hyperkalemia-  aldactone d/c 9/24  diuresis for hypervolemia   increase activity as tolerated  pulm toilet  pain management  nocturnal tf/ jhonatan count  discharge planning- rehab next week

## 2022-09-25 NOTE — PROGRESS NOTE ADULT - ASSESSMENT
71 year-old female with a history of DM2, CAD, A-Fib on apixaban, Severe Persistent Asthma (on chronic steroids, recently started on Tezspire), colon cancer s/p resection/chemo, and tracheobronchomalacia s/p tracheoplasty, and recent OM of the R foot s/b debridement and completed course of Vancomycin/Ertapenem for MRSA/ESBL Proteus/Corynebacterium who now presents with chest pain, found to have Type A dissection s/p repair with modified Bentall procedure and hemiarch replacement on 9/6/22. Post-op course complicated by acute hypoxemic respiratory failure, shock, anemia, and hyperglycemia requiring insulin gtt. Extubated 9/13, now awake and alert with mild encephalopathy but overall significantly improved.    Assessment:  Acute hypoxemic respiratory failure requiring intubation  Type A Aortic Dissection  Severe Persistent Asthma  History of Tracheobronchomalacia    9/14-improving but weakness  9/15-ABX adjusted to ceftriaxone (LFTS)  9/16-PICU, low grade temp-fever work up in progress, no resp decline or sx  9/19-resp stable at present but tenuous  9/20-for FEESST today, NGT in place w/TF  9/21-s/p FEESST-passed            9/22-TF in place at 40cc, more OOB            9/23-hypoglycemia noted and rx, no change in resp status    Plan:  - Currently doing well on RA, goal SpO2>92%  - Continue prednisone at 8 mg daily (chronic dose for her severe persistent asthma)  - Albuterol and ipratropium nebs q6h,  Budesonide 0.5 mg nebs q12h      - Chest PT, incentive spirometry, out of bed to chair  - S/p Tezspire injection on 8/29  - montelukast 10 mg   - Remains on eliquis for A-Fib + RIJ thrombus  - R arm elevation and warm compresses for superficial thrombophlebitis with edema  - Remains on mexiletine and metoprolol for A-Fib  - Currently appears euvolemic, on furosemide 40 mg oral daily, strict I/O's, close monitoring of kidney function and lytes  - s/p meropenem for Proteus mirabilis pneumonia + ESBL Proteus mirabilis infection of right foot, f/up ID and podiatry  -Full code

## 2022-09-25 NOTE — PROGRESS NOTE ADULT - SUBJECTIVE AND OBJECTIVE BOX
Diabetes Follow up note:    Chief complaint: T2DM    Interval Hx: Pt w/improved PO intake. On overnight TF (8pm-6am) w/BG values in 300s this AM. Team to restart insulin gtt this AM. Pt seen at bedside. Reports ate an egg and yogurt for breakfast today. On calorie count.  Of note, Prednisone given at 12 midnight last night.     Review of Systems:  General: + fatigue  GI: Tolerating POs. Denies N/V/D/Abd pain  CV: Denies CP/SOB  ENDO: No S&Sx of hypoglycemia  MEDS:    insulin lispro (ADMELOG) corrective regimen sliding scale   SubCutaneous three times a day before meals  insulin lispro (ADMELOG) corrective regimen sliding scale   SubCutaneous <User Schedule>  insulin lispro Injectable (ADMELOG) 8 Unit(s) SubCutaneous three times a day before meals  insulin NPH human recombinant 20 Unit(s) SubCutaneous <User Schedule>  insulin regular Infusion 6 Unit(s)/Hr IV Continuous <Continuous>    nystatin    Suspension 058979 Unit(s) Oral every 6 hours    Allergies    aspirin (Short breath)  Avelox (Short breath; Pruritus)  cefepime (Anaphylaxis)  codeine (Short breath)  Dilaudid (Short breath)  iodine (Short breath; Swelling)  penicillin (Anaphylaxis)  shellfish (Anaphylaxis)  tetanus toxoid (Short breath)  Valium (Short breath)      PE:  General: Female sitting in chair. NAD.   Vital Signs Last 24 Hrs  T(C): 36.4 (25 Sep 2022 10:34), Max: 36.8 (25 Sep 2022 00:04)  T(F): 97.5 (25 Sep 2022 10:34), Max: 98.2 (25 Sep 2022 00:04)  HR: 90 (25 Sep 2022 10:34) (64 - 99)  BP: 127/63 (25 Sep 2022 10:34) (102/69 - 127/63)  BP(mean): 89 (25 Sep 2022 10:34) (89 - 89)  RR: 18 (25 Sep 2022 10:34) (16 - 18)  SpO2: 99% (25 Sep 2022 10:34) (95% - 100%)    Parameters below as of 25 Sep 2022 10:34  Patient On (Oxygen Delivery Method): room air    HEENT: NGT in place.   Abd: Soft, NT,ND,   Extremities: Warm  Neuro: A&O X3    LABS:  POCT Blood Glucose.: 321 mg/dL (09-25-22 @ 10:26)  POCT Blood Glucose.: 296 mg/dL (09-25-22 @ 08:32)  POCT Blood Glucose.: 317 mg/dL (09-25-22 @ 07:23)  POCT Blood Glucose.: 331 mg/dL (09-25-22 @ 05:04)  POCT Blood Glucose.: 342 mg/dL (09-25-22 @ 04:17)  POCT Blood Glucose.: 358 mg/dL (09-25-22 @ 01:49)  POCT Blood Glucose.: 223 mg/dL (09-24-22 @ 22:19)  POCT Blood Glucose.: 236 mg/dL (09-24-22 @ 20:43)  POCT Blood Glucose.: 317 mg/dL (09-24-22 @ 16:35)  POCT Blood Glucose.: 288 mg/dL (09-24-22 @ 11:25)  POCT Blood Glucose.: 176 mg/dL (09-24-22 @ 07:06)  POCT Blood Glucose.: 291 mg/dL (09-24-22 @ 00:16)  POCT Blood Glucose.: 269 mg/dL (09-23-22 @ 18:06)  POCT Blood Glucose.: 273 mg/dL (09-23-22 @ 12:36)  POCT Blood Glucose.: 170 mg/dL (09-23-22 @ 09:13)  POCT Blood Glucose.: 75 mg/dL (09-23-22 @ 05:05)  POCT Blood Glucose.: 108 mg/dL (09-23-22 @ 02:08)  POCT Blood Glucose.: 230 mg/dL (09-23-22 @ 00:01)  POCT Blood Glucose.: 51 mg/dL (09-22-22 @ 23:36)  POCT Blood Glucose.: 42 mg/dL (09-22-22 @ 23:33)  POCT Blood Glucose.: 125 mg/dL (09-22-22 @ 17:21)  POCT Blood Glucose.: 198 mg/dL (09-22-22 @ 11:29)                            11.3   14.36 )-----------( 375      ( 25 Sep 2022 05:04 )             38.8       09-25    132<L>  |  96  |  33<H>  ----------------------------<  373<H>  5.2   |  24  |  0.78    eGFR: 80    Ca    9.7      09-25  Mg     2.2     09-24  Phos  4.0     09-24    TPro  8.0  /  Alb  3.9  /  TBili  0.5  /  DBili  x   /  AST  22  /  ALT  53<H>  /  AlkPhos  160<H>  09-24      Thyroid Function Tests:  09-06 @ 16:46 TSH 3.30 FreeT4 -- T3 -- Anti TPO -- Anti Thyroglobulin Ab -- TSI --      A1C with Estimated Average Glucose Result: 9.4 % (09-06-22 @ 16:46)  A1C with Estimated Average Glucose Result: 9.2 % (07-11-22 @ 07:36)  A1C with Estimated Average Glucose Result: 8.0 % (05-10-22 @ 12:26)  A1C with Estimated Average Glucose Result: 9.5 % (03-29-22 @ 13:50)  A1C with Estimated Average Glucose Result: 7.8 % (11-30-21 @ 09:05)          Contact number: karoline [phone] or [phone]

## 2022-09-25 NOTE — PROGRESS NOTE ADULT - SUBJECTIVE AND OBJECTIVE BOX
VITAL SIGNS    Telemetry:  SR 80-90  Vital Signs Last 24 Hrs  T(C): 36.6 (09-25-22 @ 04:05), Max: 36.8 (09-25-22 @ 00:04)  T(F): 97.9 (09-25-22 @ 04:05), Max: 98.2 (09-25-22 @ 00:04)  HR: 93 (09-25-22 @ 04:05) (64 - 99)  BP: 112/78 (09-25-22 @ 04:05) (102/69 - 125/78)  RR: 18 (09-25-22 @ 04:05) (16 - 18)  SpO2: 95% (09-25-22 @ 04:05) (95% - 100%)            09-24 @ 07:01  -  09-25 @ 07:00  --------------------------------------------------------  IN: 0 mL / OUT: 800 mL / NET: -800 mL       Daily     Daily   Admit Wt: Drug Dosing Weight  Height (cm): 170.2 (06 Sep 2022 15:50)  Weight (kg): 67.7 (07 Sep 2022 00:47)  BMI (kg/m2): 23.4 (07 Sep 2022 00:47)  BSA (m2): 1.79 (07 Sep 2022 00:47)    Bilirubin Total, Serum: 0.5 mg/dL (09-24 @ 09:57)  Bilirubin Total, Serum: 0.5 mg/dL (09-24 @ 09:57)    CAPILLARY BLOOD GLUCOSE      POCT Blood Glucose.: 317 mg/dL (25 Sep 2022 07:23)  POCT Blood Glucose.: 331 mg/dL (25 Sep 2022 05:04)  POCT Blood Glucose.: 342 mg/dL (25 Sep 2022 04:17)  POCT Blood Glucose.: 358 mg/dL (25 Sep 2022 01:49)  POCT Blood Glucose.: 223 mg/dL (24 Sep 2022 22:19)  POCT Blood Glucose.: 236 mg/dL (24 Sep 2022 20:43)  POCT Blood Glucose.: 317 mg/dL (24 Sep 2022 16:35)  POCT Blood Glucose.: 288 mg/dL (24 Sep 2022 11:25)          MEDICATIONS  acetaminophen    Suspension .. 675 milliGRAM(s) Oral every 6 hours PRN  albuterol/ipratropium for Nebulization 3 milliLiter(s) Nebulizer every 12 hours PRN  albuterol/ipratropium for Nebulization 3 milliLiter(s) Nebulizer every 6 hours  apixaban 5 milliGRAM(s) Oral every 12 hours  buDESOnide    Inhalation Suspension 0.5 milliGRAM(s) Inhalation every 12 hours  chlorhexidine 2% Cloths 1 Application(s) Topical <User Schedule>  collagenase Ointment 1 Application(s) Topical daily  furosemide    Tablet 40 milliGRAM(s) Oral daily  insulin lispro (ADMELOG) corrective regimen sliding scale   SubCutaneous three times a day before meals  insulin lispro (ADMELOG) corrective regimen sliding scale   SubCutaneous <User Schedule>  insulin lispro Injectable (ADMELOG) 4 Unit(s) SubCutaneous three times a day before meals  insulin lispro Injectable (ADMELOG). 8 Unit(s) SubCutaneous once  insulin NPH human recombinant 10 Unit(s) SubCutaneous <User Schedule>  metoprolol tartrate 25 milliGRAM(s) Oral every 8 hours  mexiletine 200 milliGRAM(s) Oral every 8 hours  montelukast 10 milliGRAM(s) Oral at bedtime  multivitamin 1 Tablet(s) Oral daily  nystatin    Suspension 504402 Unit(s) Oral every 6 hours  pantoprazole  Injectable 40 milliGRAM(s) IV Push daily  predniSONE   Tablet 8 milliGRAM(s) Oral daily  QUEtiapine 25 milliGRAM(s) Oral at bedtime  sodium chloride 0.9% lock flush 3 milliLiter(s) IV Push every 8 hours      >>> <<<  PHYSICAL EXAM  Subjective: " Hi, I feel okay"  Neurology: alert and oriented x 3, nonfocal, no gross deficits  CV : RRR S1S2, no murmurs, rubs or gallops   Sternal Wound :  CDI , Stable  Lungs: CTA B/L, No wheezing, rales, rhonchi. Non labored respirations   Abdomen: soft, NT, ND, (- )BM  :  voiding  Extremities: No LE edema bilaterally, +DP, No calf tenderness     LABS  09-25    132<L>  |  96  |  33<H>  ----------------------------<  373<H>  5.2   |  24  |  0.78    Ca    9.7      25 Sep 2022 05:04  Phos  4.0     09-24  Mg     2.2     09-24    TPro  8.0  /  Alb  3.9  /  TBili  0.5  /  DBili  x   /  AST  22  /  ALT  53<H>  /  AlkPhos  160<H>  09-24                                 11.3   14.36 )-----------( 375      ( 25 Sep 2022 05:04 )             38.8                 PAST MEDICAL & SURGICAL HISTORY:  Atrial fibrillation  paroxysmal, on eliquis      Diabetes  Type 2      COPD (chronic obstructive pulmonary disease)      Adrenal insufficiency  Medrol daily for over 50 years      Aortic insufficiency  moderate AR on echo 5/3/2018      Pelvic fracture      Asthma      Tracheobronchomalacia  diagnosed 2015, s/p bronchial thermoplasty 2016 (Dr Zapien); recent bronchoscopy 6/5/2018 revealed no evidence of tracheobronchomalacia in trachea or bronchial tubes      Colorectal cancer  4/2018- last treatment , chemo and radiation      Rectal bleeding      Seizure  x 1 1/7/18      DVT (deep venous thrombosis)  15-20 years ago, took coumadin      TIA (transient ischemic attack)  multiple, last 5 years ago - presents as right-sided weakness      History of partial hysterectomy  30 years ago - fibroids      H/O total knee replacement, bilateral  5 years ago      History of sinus surgery  multiple sinus surgeries      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

## 2022-09-25 NOTE — PROGRESS NOTE ADULT - ASSESSMENT
71F w/h/o uncontrolled T2DM (A1C 9.4%) on basal/bolus insulin PTA. Unknown DM complications. Also COPD, secondary adrenal Insufficiency on chronic steroids, colorectal cancer s/p resection (colostomy bag), Chronic A fib on Eliquis, and tracheomalacia and multiple intubations, recent dx of OM presented to ED at [facility] with epigastric pain, belching and central chest pain. Found on CTA to have type A aortic dissection and transferred to [facility] for surgical evaluation by Dr. Cabrera. Now s/p aortic dissection repair on 9/07/22. Endocrine consulted for assistance with uncontrolled DM/steroids. Pt now on PO diet w/calorie count during the day w/nocturnal feeds at night for optimal nutrition. Glucose severely elevated this AM due to steroid induced hyperglycemia (prednisone given at 12am). BG values elevated during day due to insulin: carb mismatch. Will adjust insulin regimen for optimal glycemic control. BG goal (100-180mg/dl).

## 2022-09-25 NOTE — PROGRESS NOTE ADULT - ASSESSMENT
71F w/h/o uncontrolled T2DM (A1C 9.4%) on basal/bolus insulin PTA. Unknown DM complications. Also COPD, secondary adrenal Insufficiency on chronic steroids, colorectal cancer s/p resection (colostomy bag), Chronic A fib on Eliquis, and tracheomalacia and multiple intubations, recent dx of OM presented to ED at [facility] with epigastric pain, belching and central chest pain. Found on CTA to have type A aortic dissection and transferred to [facility] for surgical evaluation by Dr. Cabrera.  POD# 16 Type A aortic dissection repair.  Pt had prolonged ICU stay.  Required reintubation.  Hx of asthma -Dr Allison following.  Adrenal insufficiency on long term prednisone --blood sugars have been labile--premeal reduced. Treated with iv antibiotics for osteomyelitis--podiatry signed off.  Passed FEEST.  On oral however still on TF for poor intake. Hx afib on eliquis currently aflutter  9/24 VSS; pafib  on eliquis bid; continue TF--> change to noctural TF; nph adjusted by endo; jhonatan count; continue diuresis; + hyperkalemia- K  5.4- aldactone d/c   discharge planning- rehab next week   9/25 VSS; Patient in SR 's. Continue on Eliquis 5BID for PAF. Currently tolerating Nocturnal TF. Hyperglycemic - 's - Endo to adjust insulin regimen. Will cover with 8U admelog. K 5.2 today, continue diuresis with Lasix 40mg PO daily.  71F w/h/o uncontrolled T2DM (A1C 9.4%) on basal/bolus insulin PTA. Unknown DM complications. Also COPD, secondary adrenal Insufficiency on chronic steroids, colorectal cancer s/p resection (colostomy bag), Chronic A fib on Eliquis, and tracheomalacia and multiple intubations, recent dx of OM presented to ED at [facility] with epigastric pain, belching and central chest pain. Found on CTA to have type A aortic dissection and transferred to [facility] for surgical evaluation by Dr. Cabrera.  POD# 16 Type A aortic dissection repair.  Pt had prolonged ICU stay.  Required reintubation.  Hx of asthma -Dr Allison following.  Adrenal insufficiency on long term prednisone --blood sugars have been labile--premeal reduced. Treated with iv antibiotics for osteomyelitis--podiatry signed off.  Passed FEEST.  On oral however still on TF for poor intake. Hx afib on eliquis currently aflutter  9/24 VSS; pafib  on eliquis bid; continue TF--> change to noctural TF; nph adjusted by endo; jhonatan count; continue diuresis; + hyperkalemia- K  5.4- aldactone d/c   discharge planning- rehab next week   9/25 VSS; Patient in SR 's. Continue on Eliquis 5BID for PAF. Currently tolerating Nocturnal TF. Hyperglycemic - 's - Moved to SDU to start on Insulin gtt. Endo to adjust insulin regimen. K 5.2 today, continue diuresis with Lasix 40mg PO daily.

## 2022-09-25 NOTE — PROGRESS NOTE ADULT - NUTRITIONAL ASSESSMENT
Diet, Consistent Carbohydrate/No Snacks:   DASH/TLC {Sodium & Cholesterol Restricted} (DASH)  Tube Feeding Modality: Nasogastric  Glucerna 1.5 Chago (GLUCERNA1.5RTH)  Total Volume for 24 Hours (mL): 400  Intermittent  Starting Tube Feed Rate {mL per Hour}: 40  Until Goal Tube Feed Rate (mL per Hour): 40  Tube Feeding Hours ON: 10  Tube Feeding OFF (Hours): 14  Tube Feed Start Time: 20:00  Supplement Feeding Modality:  Oral  Glucerna Shake Cans or Servings Per Day:  3       Frequency:  Daily (09-24-22 @ 10:23) [Active]

## 2022-09-26 LAB
ALBUMIN SERPL ELPH-MCNC: 3.5 G/DL — SIGNIFICANT CHANGE UP (ref 3.3–5)
ALP SERPL-CCNC: 162 U/L — HIGH (ref 40–120)
ALT FLD-CCNC: 34 U/L — SIGNIFICANT CHANGE UP (ref 10–45)
ANION GAP SERPL CALC-SCNC: 11 MMOL/L — SIGNIFICANT CHANGE UP (ref 5–17)
AST SERPL-CCNC: 17 U/L — SIGNIFICANT CHANGE UP (ref 10–40)
BILIRUB SERPL-MCNC: 0.3 MG/DL — SIGNIFICANT CHANGE UP (ref 0.2–1.2)
BUN SERPL-MCNC: 34 MG/DL — HIGH (ref 7–23)
CALCIUM SERPL-MCNC: 9.8 MG/DL — SIGNIFICANT CHANGE UP (ref 8.4–10.5)
CHLORIDE SERPL-SCNC: 99 MMOL/L — SIGNIFICANT CHANGE UP (ref 96–108)
CO2 SERPL-SCNC: 27 MMOL/L — SIGNIFICANT CHANGE UP (ref 22–31)
CREAT SERPL-MCNC: 0.76 MG/DL — SIGNIFICANT CHANGE UP (ref 0.5–1.3)
CULTURE RESULTS: SIGNIFICANT CHANGE UP
EGFR: 83 ML/MIN/1.73M2 — SIGNIFICANT CHANGE UP
GLUCOSE BLDC GLUCOMTR-MCNC: 131 MG/DL — HIGH (ref 70–99)
GLUCOSE BLDC GLUCOMTR-MCNC: 132 MG/DL — HIGH (ref 70–99)
GLUCOSE BLDC GLUCOMTR-MCNC: 166 MG/DL — HIGH (ref 70–99)
GLUCOSE BLDC GLUCOMTR-MCNC: 167 MG/DL — HIGH (ref 70–99)
GLUCOSE BLDC GLUCOMTR-MCNC: 213 MG/DL — HIGH (ref 70–99)
GLUCOSE BLDC GLUCOMTR-MCNC: 216 MG/DL — HIGH (ref 70–99)
GLUCOSE BLDC GLUCOMTR-MCNC: 244 MG/DL — HIGH (ref 70–99)
GLUCOSE BLDC GLUCOMTR-MCNC: 245 MG/DL — HIGH (ref 70–99)
GLUCOSE BLDC GLUCOMTR-MCNC: 336 MG/DL — HIGH (ref 70–99)
GLUCOSE BLDC GLUCOMTR-MCNC: 339 MG/DL — HIGH (ref 70–99)
GLUCOSE SERPL-MCNC: 201 MG/DL — HIGH (ref 70–99)
HCT VFR BLD CALC: 37.6 % — SIGNIFICANT CHANGE UP (ref 34.5–45)
HGB BLD-MCNC: 11.1 G/DL — LOW (ref 11.5–15.5)
MAGNESIUM SERPL-MCNC: 2 MG/DL — SIGNIFICANT CHANGE UP (ref 1.6–2.6)
MCHC RBC-ENTMCNC: 22.8 PG — LOW (ref 27–34)
MCHC RBC-ENTMCNC: 29.5 GM/DL — LOW (ref 32–36)
MCV RBC AUTO: 77.4 FL — LOW (ref 80–100)
NRBC # BLD: 0 /100 WBCS — SIGNIFICANT CHANGE UP (ref 0–0)
PHOSPHATE SERPL-MCNC: 3.4 MG/DL — SIGNIFICANT CHANGE UP (ref 2.5–4.5)
PLATELET # BLD AUTO: 369 K/UL — SIGNIFICANT CHANGE UP (ref 150–400)
POTASSIUM SERPL-MCNC: 4.1 MMOL/L — SIGNIFICANT CHANGE UP (ref 3.5–5.3)
POTASSIUM SERPL-SCNC: 4.1 MMOL/L — SIGNIFICANT CHANGE UP (ref 3.5–5.3)
PROT SERPL-MCNC: 7.7 G/DL — SIGNIFICANT CHANGE UP (ref 6–8.3)
RBC # BLD: 4.86 M/UL — SIGNIFICANT CHANGE UP (ref 3.8–5.2)
RBC # FLD: 25.9 % — HIGH (ref 10.3–14.5)
SARS-COV-2 RNA SPEC QL NAA+PROBE: SIGNIFICANT CHANGE UP
SODIUM SERPL-SCNC: 137 MMOL/L — SIGNIFICANT CHANGE UP (ref 135–145)
SPECIMEN SOURCE: SIGNIFICANT CHANGE UP
WBC # BLD: 13.08 K/UL — HIGH (ref 3.8–10.5)
WBC # FLD AUTO: 13.08 K/UL — HIGH (ref 3.8–10.5)

## 2022-09-26 PROCEDURE — 71045 X-RAY EXAM CHEST 1 VIEW: CPT | Mod: 26

## 2022-09-26 PROCEDURE — 99233 SBSQ HOSP IP/OBS HIGH 50: CPT | Mod: GC

## 2022-09-26 PROCEDURE — 99232 SBSQ HOSP IP/OBS MODERATE 35: CPT

## 2022-09-26 PROCEDURE — 74018 RADEX ABDOMEN 1 VIEW: CPT | Mod: 26

## 2022-09-26 RX ORDER — HUMAN INSULIN 100 [IU]/ML
25 INJECTION, SUSPENSION SUBCUTANEOUS
Refills: 0 | Status: DISCONTINUED | OUTPATIENT
Start: 2022-09-26 | End: 2022-09-27

## 2022-09-26 RX ORDER — HUMAN INSULIN 100 [IU]/ML
8 INJECTION, SUSPENSION SUBCUTANEOUS
Refills: 0 | Status: DISCONTINUED | OUTPATIENT
Start: 2022-09-26 | End: 2022-09-27

## 2022-09-26 RX ORDER — INSULIN LISPRO 100/ML
8 VIAL (ML) SUBCUTANEOUS
Refills: 0 | Status: DISCONTINUED | OUTPATIENT
Start: 2022-09-26 | End: 2022-09-27

## 2022-09-26 RX ORDER — SIMETHICONE 80 MG/1
80 TABLET, CHEWABLE ORAL EVERY 12 HOURS
Refills: 0 | Status: DISCONTINUED | OUTPATIENT
Start: 2022-09-26 | End: 2022-10-10

## 2022-09-26 RX ORDER — INSULIN LISPRO 100/ML
10 VIAL (ML) SUBCUTANEOUS
Refills: 0 | Status: DISCONTINUED | OUTPATIENT
Start: 2022-09-27 | End: 2022-09-27

## 2022-09-26 RX ORDER — INSULIN LISPRO 100/ML
12 VIAL (ML) SUBCUTANEOUS
Refills: 0 | Status: DISCONTINUED | OUTPATIENT
Start: 2022-09-27 | End: 2022-09-27

## 2022-09-26 RX ADMIN — Medication 3 MILLILITER(S): at 11:28

## 2022-09-26 RX ADMIN — SODIUM CHLORIDE 3 MILLILITER(S): 9 INJECTION INTRAMUSCULAR; INTRAVENOUS; SUBCUTANEOUS at 21:17

## 2022-09-26 RX ADMIN — Medication 8 UNIT(S): at 16:43

## 2022-09-26 RX ADMIN — Medication 675 MILLIGRAM(S): at 21:15

## 2022-09-26 RX ADMIN — Medication 500000 UNIT(S): at 05:11

## 2022-09-26 RX ADMIN — Medication 25 MILLIGRAM(S): at 05:11

## 2022-09-26 RX ADMIN — CHLORHEXIDINE GLUCONATE 1 APPLICATION(S): 213 SOLUTION TOPICAL at 05:12

## 2022-09-26 RX ADMIN — Medication 4: at 16:43

## 2022-09-26 RX ADMIN — Medication 1 APPLICATION(S): at 11:30

## 2022-09-26 RX ADMIN — Medication 8: at 11:29

## 2022-09-26 RX ADMIN — Medication 0.5 MILLIGRAM(S): at 17:08

## 2022-09-26 RX ADMIN — Medication 25 MILLIGRAM(S): at 21:15

## 2022-09-26 RX ADMIN — Medication 5 MILLIGRAM(S): at 21:14

## 2022-09-26 RX ADMIN — Medication 4: at 02:00

## 2022-09-26 RX ADMIN — Medication 25 MILLIGRAM(S): at 13:01

## 2022-09-26 RX ADMIN — Medication 8 UNIT(S): at 11:30

## 2022-09-26 RX ADMIN — Medication 8 MILLIGRAM(S): at 05:10

## 2022-09-26 RX ADMIN — SODIUM CHLORIDE 3 MILLILITER(S): 9 INJECTION INTRAMUSCULAR; INTRAVENOUS; SUBCUTANEOUS at 05:12

## 2022-09-26 RX ADMIN — Medication 3 MILLILITER(S): at 00:04

## 2022-09-26 RX ADMIN — MEXILETINE HYDROCHLORIDE 200 MILLIGRAM(S): 150 CAPSULE ORAL at 13:01

## 2022-09-26 RX ADMIN — Medication 3 MILLILITER(S): at 17:08

## 2022-09-26 RX ADMIN — PANTOPRAZOLE SODIUM 40 MILLIGRAM(S): 20 TABLET, DELAYED RELEASE ORAL at 11:29

## 2022-09-26 RX ADMIN — Medication 3 MILLILITER(S): at 22:37

## 2022-09-26 RX ADMIN — APIXABAN 5 MILLIGRAM(S): 2.5 TABLET, FILM COATED ORAL at 09:22

## 2022-09-26 RX ADMIN — HUMAN INSULIN 25 UNIT(S): 100 INJECTION, SUSPENSION SUBCUTANEOUS at 20:43

## 2022-09-26 RX ADMIN — Medication 500000 UNIT(S): at 17:17

## 2022-09-26 RX ADMIN — Medication 100 MILLIGRAM(S): at 21:15

## 2022-09-26 RX ADMIN — APIXABAN 5 MILLIGRAM(S): 2.5 TABLET, FILM COATED ORAL at 21:15

## 2022-09-26 RX ADMIN — MONTELUKAST 10 MILLIGRAM(S): 4 TABLET, CHEWABLE ORAL at 21:14

## 2022-09-26 RX ADMIN — Medication 3 MILLILITER(S): at 05:11

## 2022-09-26 RX ADMIN — Medication 500000 UNIT(S): at 11:28

## 2022-09-26 RX ADMIN — SODIUM CHLORIDE 3 MILLILITER(S): 9 INJECTION INTRAMUSCULAR; INTRAVENOUS; SUBCUTANEOUS at 13:00

## 2022-09-26 RX ADMIN — Medication 8 UNIT(S): at 09:03

## 2022-09-26 RX ADMIN — Medication 1 TABLET(S): at 11:28

## 2022-09-26 RX ADMIN — MEXILETINE HYDROCHLORIDE 200 MILLIGRAM(S): 150 CAPSULE ORAL at 21:16

## 2022-09-26 RX ADMIN — Medication 40 MILLIGRAM(S): at 05:11

## 2022-09-26 RX ADMIN — Medication 0.5 MILLIGRAM(S): at 05:11

## 2022-09-26 RX ADMIN — MEXILETINE HYDROCHLORIDE 200 MILLIGRAM(S): 150 CAPSULE ORAL at 05:11

## 2022-09-26 RX ADMIN — Medication 500000 UNIT(S): at 00:04

## 2022-09-26 RX ADMIN — Medication 675 MILLIGRAM(S): at 22:12

## 2022-09-26 RX ADMIN — QUETIAPINE FUMARATE 25 MILLIGRAM(S): 200 TABLET, FILM COATED ORAL at 21:14

## 2022-09-26 RX ADMIN — SIMETHICONE 80 MILLIGRAM(S): 80 TABLET, CHEWABLE ORAL at 21:17

## 2022-09-26 NOTE — PROGRESS NOTE ADULT - PROBLEM SELECTOR PLAN 3
Off rosuvastatin 5mg daily  Re start if not contraindicated and as per cardiology  -F/u levels as out pt.

## 2022-09-26 NOTE — PROVIDER CONTACT NOTE (OTHER) - ASSESSMENT
Patient drowsy, AOx2(person/age), follows commands, and no longer complaining of nausea. VSS. Patient passing lots of gas via colostomy, but no stool.

## 2022-09-26 NOTE — PROGRESS NOTE ADULT - SUBJECTIVE AND OBJECTIVE BOX
VITAL SIGNS    Telemetry:  nsr 80    Vital Signs Last 24 Hrs  T(C): 36.4 (22 @ 11:10), Max: 36.6 (22 @ 19:52)  T(F): 97.5 (22 @ 11:10), Max: 97.9 (22 @ 19:52)  HR: 95 (22 @ 11:10) (84 - 101)  BP: 130/80 (22 @ 11:10) (111/59 - 140/61)  RR: 18 (22 @ 11:10) (18 - 18)  SpO2: 95% (22 @ 11:10) (95% - 100%)                    @ 07:01  -   @ 07:00  --------------------------------------------------------  IN: 683 mL / OUT: 1450 mL / NET: -767 mL     @ 07:01  -   @ 12:15  --------------------------------------------------------  IN: 260 mL / OUT: 0 mL / NET: 260 mL          Daily     Daily Weight in k (26 Sep 2022 05:40)            CAPILLARY BLOOD GLUCOSE      POCT Blood Glucose.: 339 mg/dL (26 Sep 2022 11:20)  POCT Blood Glucose.: 131 mg/dL (26 Sep 2022 07:27)  POCT Blood Glucose.: 166 mg/dL (26 Sep 2022 06:09)  POCT Blood Glucose.: 213 mg/dL (26 Sep 2022 05:19)  POCT Blood Glucose.: 216 mg/dL (26 Sep 2022 04:01)  POCT Blood Glucose.: 245 mg/dL (26 Sep 2022 03:06)  POCT Blood Glucose.: 336 mg/dL (26 Sep 2022 02:01)  POCT Blood Glucose.: 179 mg/dL (25 Sep 2022 20:52)  POCT Blood Glucose.: 110 mg/dL (25 Sep 2022 16:33)  POCT Blood Glucose.: 104 mg/dL (25 Sep 2022 15:36)  POCT Blood Glucose.: 122 mg/dL (25 Sep 2022 14:33)  POCT Blood Glucose.: 144 mg/dL (25 Sep 2022 13:31)  POCT Blood Glucose.: 314 mg/dL (25 Sep 2022 12:34)                Pacing Wires           Coumadin    [ ] YES          [ x ]      NO         eliquis                          PHYSICAL EXAM        Neurology: alert and oriented x 3, nonfocal, no gross deficits  CV : s1 s2 RRR  Sternal Wound :  CDI , Stable  Lungs: cta  Abdomen: soft, nontender, nondistended, positive bowel sounds, last bowel movement                       :    voiding       Extremities:     - edema   /  -   calve tenderness ,              acetaminophen    Suspension .. 675 milliGRAM(s) Oral every 6 hours PRN  albuterol/ipratropium for Nebulization 3 milliLiter(s) Nebulizer every 12 hours PRN  albuterol/ipratropium for Nebulization 3 milliLiter(s) Nebulizer every 6 hours  apixaban 5 milliGRAM(s) Oral every 12 hours  buDESOnide    Inhalation Suspension 0.5 milliGRAM(s) Inhalation every 12 hours  chlorhexidine 2% Cloths 1 Application(s) Topical <User Schedule>  collagenase Ointment 1 Application(s) Topical daily  dextrose 50% Injectable 50 milliLiter(s) IV Push every 15 minutes  furosemide    Tablet 40 milliGRAM(s) Oral daily  insulin lispro (ADMELOG) corrective regimen sliding scale   SubCutaneous three times a day before meals  insulin lispro (ADMELOG) corrective regimen sliding scale   SubCutaneous <User Schedule>  insulin lispro Injectable (ADMELOG) 8 Unit(s) SubCutaneous three times a day before meals  insulin NPH human recombinant 25 Unit(s) SubCutaneous <User Schedule>  metoprolol tartrate 25 milliGRAM(s) Oral every 8 hours  mexiletine 200 milliGRAM(s) Oral every 8 hours  montelukast 10 milliGRAM(s) Oral at bedtime  multivitamin 1 Tablet(s) Oral daily  nystatin    Suspension [id number] Unit(s) Oral every 6 hours  pantoprazole  Injectable 40 milliGRAM(s) IV Push daily  predniSONE   Tablet 8 milliGRAM(s) Oral daily  QUEtiapine 25 milliGRAM(s) Oral at bedtime  sodium chloride 0.9% lock flush 3 milliLiter(s) IV Push every 8 hours                    Physical Therapy Rec:   Home  [  ]   Home w/ PT  [  ]  Rehab  [  ]  Discussed with Cardiothoracic Team at AM rounds.

## 2022-09-26 NOTE — PROGRESS NOTE ADULT - PROBLEM SELECTOR PLAN 1
-test BG hourly while on insulin gtt. Once off gtt can check AC/HS/2AM daily  -Insulin drip to be started if BGs remain elevated while on sq insulin. Discontinue once BG <150s.   -Increase NPH dose to 25 units at 8pm daily for TF and NPH 8 If BG >250 at 2 am while on TFs. Hold of TFs off  -Change Admelog to 12-10- 8 units TID w/meals (Hold if not eating). If having Glucerna plus PO please give whole dose . If having Glucerna only administer 50% of the dose.   -c/w Admelog moderate correction scale AC and mod HS/2AM scales for now   -C/w calorie count  Discharge plan:  - Likely to discharge patient home on basal/bolus insulin. Final regimen pending clinical course.  - Recommend routine outpatient ophthalmology, podiatry  - Can f/u with endocrinologist Dr. Saavedra.  - Make sure pt has Rx for all DM supplies and insulin/ DM meds.  -Based on pt's clinical condition and mental status at this time she is not able to independently manage her DM. Will evaluate pt's ability to manage DM at time of discharge. If unable> pt will need family/ care giver (s) to manage DM care at home  -Might need rehab.

## 2022-09-26 NOTE — PROGRESS NOTE ADULT - ASSESSMENT
71F w/h/o uncontrolled T2DM (A1C 9.4%) on basal/bolus insulin PTA. Unknown DM complications. Also COPD, secondary adrenal Insufficiency on chronic steroids, colorectal cancer s/p resection (colostomy bag), Chronic A fib on Eliquis, and tracheomalacia and multiple intubations, recent dx of OM presented to ED at [facility] with epigastric pain, belching and central chest pain. Found on CTA to have type A aortic dissection and transferred to [facility] for surgical evaluation by Dr. Cabrera.  POD# 16 Type A aortic dissection repair.  Pt had prolonged ICU stay.  Required reintubation.  Hx of asthma -Dr Allison following.  Adrenal insufficiency on long term prednisone --blood sugars have been labile--premeal reduced. Treated with iv antibiotics for osteomyelitis--podiatry signed off.  Passed FEEST.  On oral however still on TF for poor intake. Hx afib on eliquis currently aflutter  9/24 VSS; pafib  on eliquis bid; continue TF--> change to noctural TF; nph adjusted by endo; jhonatan count; continue diuresis; + hyperkalemia- K  5.4- aldactone d/c   discharge planning- rehab next week   9/25 VSS; Patient in SR 's. Continue on Eliquis 5BID for PAF. Currently tolerating Nocturnal TF. Hyperglycemic - 's - Moved to SDU to start on Insulin gtt. Endo to adjust insulin regimen. K 5.2 today, continue diuresis with Lasix 40mg PO daily.   9/26 Cont calorie count, when sufficient to d/c ngt  Ambulate  Hyperglycemia, now uncontrolled, endo following.

## 2022-09-26 NOTE — PROVIDER CONTACT NOTE (OTHER) - RECOMMENDATIONS
PATIENT CONTINUES TO BE TURNED AND POSITIONED Q2H TO CONTINUE TO PROMOTE HEALTHY SKIN AND REDUCE RISK OF SKIN INJURY. NO FURTHER TREAMENT NEEDED DUE TO NO EVIDENCE OF SKIN INJURY.
awaiting orders
[facility] ASSESS PATIENT AND RECOMMEND MEDICATION, TREATMENT AND DRESSING. SEE EMAR AND ORDER SET (PROVIDER TO RN)

## 2022-09-26 NOTE — PROGRESS NOTE ADULT - NSPROGADDITIONALINFOA_GEN_ALL_CORE
-Plan discussed with pt/team.  Contact info: [phone] (24/7). pager [phone]  [email or web address] password [email or web address]  Spent 29 minutes assessing pt/labs/meds and discussing plan of care with primary team  Adjusting insulin  Discharge plan  Follow up care

## 2022-09-26 NOTE — PROGRESS NOTE ADULT - NUTRITIONAL ASSESSMENT
Diet, Consistent Carbohydrate/No Snacks:   DASH/TLC {Sodium & Cholesterol Restricted} (DASH)  Tube Feeding Modality: Nasogastric  Glucerna 1.5 Chago (GLUCERNA1.5RTH)  Total Volume for 24 Hours (mL): 400  Intermittent  Starting Tube Feed Rate {mL per Hour}: 40  Until Goal Tube Feed Rate (mL per Hour): 40  Tube Feeding Hours ON: 10  Tube Feeding OFF (Hours): 14  Tube Feed Start Time: 20:00  Supplement Feeding Modality:  Oral  Glucerna Shake Cans or Servings Per Day:  3       Frequency:  Daily (09-24-22 @ 10:23) [Active]    Please see RD assessment and/or follow up.  Managed by primary team as well

## 2022-09-26 NOTE — PROGRESS NOTE ADULT - ASSESSMENT
71F w/h/o uncontrolled T2DM (A1C 9.4%) on basal/bolus insulin PTA. Unknown DM complications. Also COPD, secondary adrenal Insufficiency on chronic steroids, colorectal cancer s/p resection (colostomy bag), Chronic A fib on Eliquis, and tracheomalacia and multiple intubations, recent dx of OM presented to ED at [facility] with epigastric pain, belching and central chest pain. Found on CTA to have type A aortic dissection and transferred to [facility] for surgical evaluation by Dr. Cabrera. Now s/p aortic dissection repair on 9/07/22. Endocrine consulted for assistance with uncontrolled DM/steroids. Pt on POs w/calorie count during the day w/nocturnal feeds at night for optimal nutrition. Glucose levels  between 100s to 300s in the last 24 hours requiring overnight insulin drip while on TFs and after taking Prednisone.  PO intake improving per calorie count but still unpredictable. Will adjust insulin regimen to BG goal (100-180mg/dl).

## 2022-09-26 NOTE — PROGRESS NOTE ADULT - ASSESSMENT
71 year-old female with a history of DM2, CAD, A-Fib on apixaban, Severe Persistent Asthma (on chronic steroids, recently started on Tezspire), colon cancer s/p resection/chemo, tracheobronchomalacia s/p tracheoplasty, and recent OM of the R foot s/b debridement and completed course of Vancomycin/Ertapenem for MRSA/ESBL Proteus/Corynebacterium who now presents with chest pain, found to have Type A dissection s/p repair with modified Bentall procedure and hemiarch replacement on 9/6/22. Post-op course complicated by acute hypoxemic respiratory failure, shock, anemia, and hyperglycemia requiring insulin gtt. Extubated 9/13, now clinically improved.    Assessment:  Resolved acute hypoxemic respiratory failure  Type A Aortic Dissection  Severe Persistent Asthma  History of Tracheobronchomalacia    Plan:  - Currently doing well on RA, goal SpO2>92%  - Continue prednisone at 8 mg daily (chronic dose for her severe persistent asthma)  - Albuterol and ipratropium nebs q6h  - Budesonide 0.5 mg nebs q12h     - Chest PT, incentive spirometry, out of bed to chair  - S/p Tezspire injection on 8/29  - Montelukast 10 mg at bedtime  - Remains on eliquis for A-Fib + RIJ thrombus  - Remains on mexiletine and metoprolol for A-Fib  - Currently appears euvolemic, on furosemide 40 mg oral daily, strict I/O's, close monitoring of kidney function and lytes  - S/p meropenem for Proteus mirabilis pneumonia + ESBL Proteus mirabilis infection of right foot, currently off antibiotics  - Discussed with patient and family at bedside

## 2022-09-26 NOTE — PROGRESS NOTE ADULT - SUBJECTIVE AND OBJECTIVE BOX
[facility] - Division of Pulmonary, Critical Care and Sleep Medicine   Please call [phone] between 8am-pm weekdays, [phone] after hours and weekends    Interval Events: no acute events overnight, no fevers or chills, no chest pain or dyspnea    REVIEW OF SYSTEMS:  Constitutional: no fever, chills, fatigue; feels tired  Neuro: no headache, numbness, weakness  Resp: no cough, wheezing, shortness of breath  CVS: no chest pain, palpitations, leg swelling  GI: no abdominal pain, nausea, vomiting, diarrhea   : no dysuria, frequency, incontinence  Skin: no itching, burning, rashes, or lesions   Msk: no joint pain or swelling  Psych: no depression, anxiety  [x] All other systems negative    OBJECTIVE:  ICU Vital Signs Last 24 Hrs  T(C): 36.6 (26 Sep 2022 14:29), Max: 36.6 (25 Sep 2022 19:52)  T(F): 97.8 (26 Sep 2022 14:29), Max: 97.9 (25 Sep 2022 19:52)  HR: 100 (26 Sep 2022 14:29) (84 - 100)  BP: 129/75 (26 Sep 2022 14:29) (111/59 - 130/80)  BP(mean): 97 (26 Sep 2022 14:29) (73 - 98)  RR: 18 (26 Sep 2022 14:29) (18 - 18)  SpO2: 98% (26 Sep 2022 14:29) (95% - 100%)    O2 Parameters below as of 26 Sep 2022 14:29  Patient On (Oxygen Delivery Method): room air      09-25 @ 07:01  -  09-26 @ 07:00  --------------------------------------------------------  IN: 683 mL / OUT: 1450 mL / NET: -767 mL    CAPILLARY BLOOD GLUCOSE    POCT Blood Glucose.: 244 mg/dL (26 Sep 2022 16:10)      PHYSICAL EXAM:  Gen: NAD; AAOx3  Neuro: CN II-XII grossly intact; moving all extremities   HEENT: NC/AT; EOMI; MMM   CV: normal S1 & S2; regular rate and rhythm; midline sternotomy site c/d/i  Pulm: clear to auscultation bilaterally   GI: soft; NT/ND  Ext: no edema; pulses intact  Skin: warm, well perfused    HOSPITAL MEDICATIONS:  MEDICATIONS  (STANDING):  albuterol/ipratropium for Nebulization 3 milliLiter(s) Nebulizer every 6 hours  apixaban 5 milliGRAM(s) Oral every 12 hours  buDESOnide    Inhalation Suspension 0.5 milliGRAM(s) Inhalation every 12 hours  chlorhexidine 2% Cloths 1 Application(s) Topical <User Schedule>  collagenase Ointment 1 Application(s) Topical daily  dextrose 50% Injectable 50 milliLiter(s) IV Push every 15 minutes  furosemide    Tablet 40 milliGRAM(s) Oral daily  insulin lispro (ADMELOG) corrective regimen sliding scale   SubCutaneous three times a day before meals  insulin lispro (ADMELOG) corrective regimen sliding scale   SubCutaneous <User Schedule>  insulin lispro Injectable (ADMELOG) 8 Unit(s) SubCutaneous before dinner  insulin NPH human recombinant 25 Unit(s) SubCutaneous <User Schedule>  insulin NPH human recombinant 8 Unit(s) SubCutaneous <User Schedule>  metoprolol tartrate 25 milliGRAM(s) Oral every 8 hours  mexiletine 200 milliGRAM(s) Oral every 8 hours  montelukast 10 milliGRAM(s) Oral at bedtime  multivitamin 1 Tablet(s) Oral daily  nystatin    Suspension 266367 Unit(s) Oral every 6 hours  pantoprazole  Injectable 40 milliGRAM(s) IV Push daily  predniSONE   Tablet 8 milliGRAM(s) Oral daily  QUEtiapine 25 milliGRAM(s) Oral at bedtime  sodium chloride 0.9% lock flush 3 milliLiter(s) IV Push every 8 hours    MEDICATIONS  (PRN):  acetaminophen    Suspension .. 675 milliGRAM(s) Oral every 6 hours PRN Moderate Pain (4 - 6)  albuterol/ipratropium for Nebulization 3 milliLiter(s) Nebulizer every 12 hours PRN Shortness of Breath and/or Wheezing      LABS:                        11.1   13.08 )-----------( 369      ( 26 Sep 2022 06:12 )             37.6     Hgb Trend: 11.1<--, 11.3<--, 12.1<--, 11.6<--, 11.5<--  09-26    137  |  99  |  34<H>  ----------------------------<  201<H>  4.1   |  27  |  0.76    Ca    9.8      26 Sep 2022 06:13  Phos  3.4     09-26  Mg     2.0     09-26    TPro  7.7  /  Alb  3.5  /  TBili  0.3  /  DBili  x   /  AST  17  /  ALT  34  /  AlkPhos  162<H>  09-26    Creatinine Trend: 0.76<--, 0.78<--, 0.94<--, 0.84<--, 0.70<--, 0.72<--

## 2022-09-26 NOTE — PROGRESS NOTE ADULT - SUBJECTIVE AND OBJECTIVE BOX
DIABETES FOLLOW UP NOTE: Saw pt earlier today    Chief Complaint: Endocrine consult requested for management of T2DM  INTERVAL HX: Pt stable, reports she has pain in her R arm and wants something for pain. States she is eating and noted calorie count> eating about 50% of breakfast today plus Glucerna with BG 300s ac lunch. BG also 300s overnight while on TFs of Glucerna at 40cc/hr from 6pm and 6am requiring insulin drip for few hours at 2 units/hr. On Prednisone 8mg daily. Pt alert and able to answer questions but gets easily confused.        Review of Systems:  General: As above  Cardiovascular: No chest pain, palpitations  Respiratory: No SOB, no cough  GI: No nausea, vomiting, abdominal pain  Endocrine: No S&Sx of hypoglycemia    Allergies    aspirin (Short breath)  Avelox (Short breath; Pruritus)  cefepime (Anaphylaxis)  codeine (Short breath)  Dilaudid (Short breath)  iodine (Short breath; Swelling)  penicillin (Anaphylaxis)  shellfish (Anaphylaxis)  tetanus toxoid (Short breath)  Valium (Short breath)    Intolerances      MEDICATIONS:  insulin lispro (ADMELOG) corrective regimen sliding scale   SubCutaneous three times a day before meals  insulin lispro (ADMELOG) corrective regimen sliding scale   SubCutaneous <User Schedule>  insulin lispro Injectable (ADMELOG) 8 Unit(s) SubCutaneous three times a day before meals  insulin NPH human recombinant 25 Unit(s) SubCutaneous <User Schedule>  predniSONE   Tablet 8 milliGRAM(s) Oral daily    PHYSICAL EXAM:  VITALS: T(C): 36.6 (09-26-22 @ 14:29)  T(F): 97.8 (09-26-22 @ 14:29), Max: 97.9 (09-25-22 @ 19:52)  HR: 100 (09-26-22 @ 14:29) (84 - 100)  BP: 129/75 (09-26-22 @ 14:29) (111/59 - 130/80)  RR:  (18 - 18)  SpO2:  (95% - 100%)  Wt(kg): --  GENERAL: in NAD  Abdomen: Soft, nontender, non distended  Extremities: Warm, no edema in all 4 exts  NEURO: A&O X3    LABS:  POCT Blood Glucose.: 339 mg/dL (09-26-22 @ 11:20)  POCT Blood Glucose.: 131 mg/dL (09-26-22 @ 07:27) Insulin drip off  POCT Blood Glucose.: 166 mg/dL (09-26-22 @ 06:09)  POCT Blood Glucose.: 213 mg/dL (09-26-22 @ 05:19)  POCT Blood Glucose.: 216 mg/dL (09-26-22 @ 04:01)  POCT Blood Glucose.: 245 mg/dL (09-26-22 @ 03:06)  POCT Blood Glucose.: 336 mg/dL (09-26-22 @ 02:01) Insulin drip  POCT Blood Glucose.: 179 mg/dL (09-25-22 @ 20:52)  POCT Blood Glucose.: 110 mg/dL (09-25-22 @ 16:33)  POCT Blood Glucose.: 104 mg/dL (09-25-22 @ 15:36)  POCT Blood Glucose.: 122 mg/dL (09-25-22 @ 14:33)  POCT Blood Glucose.: 144 mg/dL (09-25-22 @ 13:31)  POCT Blood Glucose.: 314 mg/dL (09-25-22 @ 12:34)  POCT Blood Glucose.: 337 mg/dL (09-25-22 @ 11:36)  POCT Blood Glucose.: 321 mg/dL (09-25-22 @ 10:26)  POCT Blood Glucose.: 296 mg/dL (09-25-22 @ 08:32)  POCT Blood Glucose.: 317 mg/dL (09-25-22 @ 07:23)  POCT Blood Glucose.: 331 mg/dL (09-25-22 @ 05:04)  POCT Blood Glucose.: 342 mg/dL (09-25-22 @ 04:17)  POCT Blood Glucose.: 358 mg/dL (09-25-22 @ 01:49)  POCT Blood Glucose.: 223 mg/dL (09-24-22 @ 22:19)  POCT Blood Glucose.: 236 mg/dL (09-24-22 @ 20:43)  POCT Blood Glucose.: 317 mg/dL (09-24-22 @ 16:35)  POCT Blood Glucose.: 288 mg/dL (09-24-22 @ 11:25)  POCT Blood Glucose.: 176 mg/dL (09-24-22 @ 07:06)  POCT Blood Glucose.: 291 mg/dL (09-24-22 @ 00:16)  POCT Blood Glucose.: 269 mg/dL (09-23-22 @ 18:06)                            11.1   13.08 )-----------( 369      ( 26 Sep 2022 06:12 )             37.6       09-26    137  |  99  |  34<H>  ----------------------------<  201<H>  4.1   |  27  |  0.76    eGFR: 83    Ca    9.8      09-26  Mg     2.0     09-26  Phos  3.4     09-26    TPro  7.7  /  Alb  3.5  /  TBili  0.3  /  DBili  x   /  AST  17  /  ALT  34  /  AlkPhos  162<H>  09-26    Thyroid Function Tests:  09-06 @ 16:46 TSH 3.30 FreeT4 -- T3 -- Anti TPO -- Anti Thyroglobulin Ab -- TSI --      A1C with Estimated Average Glucose Result: 9.4 % (09-06-22 @ 16:46)  A1C with Estimated Average Glucose Result: 9.2 % (07-11-22 @ 07:36)      Estimated Average Glucose: 223 mg/dL (09-06-22 @ 16:46)  Estimated Average Glucose: 217 mg/dL (07-11-22 @ 07:36)

## 2022-09-27 LAB
ALBUMIN SERPL ELPH-MCNC: 3.3 G/DL — SIGNIFICANT CHANGE UP (ref 3.3–5)
ALBUMIN SERPL ELPH-MCNC: 4 G/DL — SIGNIFICANT CHANGE UP (ref 3.3–5)
ALP SERPL-CCNC: 101 U/L — SIGNIFICANT CHANGE UP (ref 40–120)
ALP SERPL-CCNC: 177 U/L — HIGH (ref 40–120)
ALT FLD-CCNC: 27 U/L — SIGNIFICANT CHANGE UP (ref 10–45)
ALT FLD-CCNC: 2878 U/L — HIGH (ref 10–45)
AMYLASE P1 CFR SERPL: 23 U/L — LOW (ref 25–125)
ANION GAP SERPL CALC-SCNC: 17 MMOL/L — SIGNIFICANT CHANGE UP (ref 5–17)
ANION GAP SERPL CALC-SCNC: 17 MMOL/L — SIGNIFICANT CHANGE UP (ref 5–17)
APPEARANCE UR: CLEAR — SIGNIFICANT CHANGE UP
APPEARANCE UR: CLEAR — SIGNIFICANT CHANGE UP
APTT BLD: 30.4 SEC — SIGNIFICANT CHANGE UP (ref 27.5–35.5)
APTT BLD: 34.1 SEC — SIGNIFICANT CHANGE UP (ref 27.5–35.5)
AST SERPL-CCNC: 17 U/L — SIGNIFICANT CHANGE UP (ref 10–40)
AST SERPL-CCNC: 4451 U/L — HIGH (ref 10–40)
BACTERIA # UR AUTO: NEGATIVE — SIGNIFICANT CHANGE UP
BACTERIA # UR AUTO: NEGATIVE — SIGNIFICANT CHANGE UP
BASE EXCESS BLDV CALC-SCNC: -3 MMOL/L — LOW (ref -2–3)
BASE EXCESS BLDV CALC-SCNC: 0.4 MMOL/L — SIGNIFICANT CHANGE UP (ref -2–3)
BILIRUB SERPL-MCNC: 0.4 MG/DL — SIGNIFICANT CHANGE UP (ref 0.2–1.2)
BILIRUB SERPL-MCNC: 2 MG/DL — HIGH (ref 0.2–1.2)
BILIRUB UR-MCNC: NEGATIVE — SIGNIFICANT CHANGE UP
BILIRUB UR-MCNC: NEGATIVE — SIGNIFICANT CHANGE UP
BLD GP AB SCN SERPL QL: NEGATIVE — SIGNIFICANT CHANGE UP
BUN SERPL-MCNC: 39 MG/DL — HIGH (ref 7–23)
BUN SERPL-MCNC: 42 MG/DL — HIGH (ref 7–23)
CA-I SERPL-SCNC: 1.33 MMOL/L — SIGNIFICANT CHANGE UP (ref 1.15–1.33)
CALCIUM SERPL-MCNC: 9.4 MG/DL — SIGNIFICANT CHANGE UP (ref 8.4–10.5)
CALCIUM SERPL-MCNC: 9.6 MG/DL — SIGNIFICANT CHANGE UP (ref 8.4–10.5)
CHLORIDE BLDV-SCNC: 105 MMOL/L — SIGNIFICANT CHANGE UP (ref 96–108)
CHLORIDE SERPL-SCNC: 103 MMOL/L — SIGNIFICANT CHANGE UP (ref 96–108)
CHLORIDE SERPL-SCNC: 97 MMOL/L — SIGNIFICANT CHANGE UP (ref 96–108)
CO2 BLDV-SCNC: 26 MMOL/L — SIGNIFICANT CHANGE UP (ref 22–26)
CO2 BLDV-SCNC: 32 MMOL/L — HIGH (ref 22–26)
CO2 SERPL-SCNC: 20 MMOL/L — LOW (ref 22–31)
CO2 SERPL-SCNC: 21 MMOL/L — LOW (ref 22–31)
COLOR SPEC: YELLOW — SIGNIFICANT CHANGE UP
COLOR SPEC: YELLOW — SIGNIFICANT CHANGE UP
COMMENT - URINE: SIGNIFICANT CHANGE UP
COMMENT - URINE: SIGNIFICANT CHANGE UP
CREAT SERPL-MCNC: 1.08 MG/DL — SIGNIFICANT CHANGE UP (ref 0.5–1.3)
CREAT SERPL-MCNC: 1.74 MG/DL — HIGH (ref 0.5–1.3)
DIFF PNL FLD: NEGATIVE — SIGNIFICANT CHANGE UP
DIFF PNL FLD: NEGATIVE — SIGNIFICANT CHANGE UP
EGFR: 31 ML/MIN/1.73M2 — LOW
EGFR: 54 ML/MIN/1.73M2 — LOW
EPI CELLS # UR: 1 /HPF — SIGNIFICANT CHANGE UP
EPI CELLS # UR: 2 /HPF — SIGNIFICANT CHANGE UP
GAS PNL BLDA: SIGNIFICANT CHANGE UP
GAS PNL BLDV: 140 MMOL/L — SIGNIFICANT CHANGE UP (ref 136–145)
GAS PNL BLDV: SIGNIFICANT CHANGE UP
GAS PNL BLDV: SIGNIFICANT CHANGE UP
GLUCOSE BLDC GLUCOMTR-MCNC: 108 MG/DL — HIGH (ref 70–99)
GLUCOSE BLDC GLUCOMTR-MCNC: 113 MG/DL — HIGH (ref 70–99)
GLUCOSE BLDC GLUCOMTR-MCNC: 141 MG/DL — HIGH (ref 70–99)
GLUCOSE BLDC GLUCOMTR-MCNC: 173 MG/DL — HIGH (ref 70–99)
GLUCOSE BLDC GLUCOMTR-MCNC: 197 MG/DL — HIGH (ref 70–99)
GLUCOSE BLDC GLUCOMTR-MCNC: 235 MG/DL — HIGH (ref 70–99)
GLUCOSE BLDC GLUCOMTR-MCNC: 241 MG/DL — HIGH (ref 70–99)
GLUCOSE BLDC GLUCOMTR-MCNC: 252 MG/DL — HIGH (ref 70–99)
GLUCOSE BLDC GLUCOMTR-MCNC: 271 MG/DL — HIGH (ref 70–99)
GLUCOSE BLDC GLUCOMTR-MCNC: 350 MG/DL — HIGH (ref 70–99)
GLUCOSE BLDC GLUCOMTR-MCNC: 90 MG/DL — SIGNIFICANT CHANGE UP (ref 70–99)
GLUCOSE BLDC GLUCOMTR-MCNC: 98 MG/DL — SIGNIFICANT CHANGE UP (ref 70–99)
GLUCOSE BLDC GLUCOMTR-MCNC: 99 MG/DL — SIGNIFICANT CHANGE UP (ref 70–99)
GLUCOSE BLDV-MCNC: 86 MG/DL — SIGNIFICANT CHANGE UP (ref 70–99)
GLUCOSE SERPL-MCNC: 162 MG/DL — HIGH (ref 70–99)
GLUCOSE SERPL-MCNC: 388 MG/DL — HIGH (ref 70–99)
GLUCOSE UR QL: ABNORMAL
GLUCOSE UR QL: NEGATIVE — SIGNIFICANT CHANGE UP
HCO3 BLDV-SCNC: 24 MMOL/L — SIGNIFICANT CHANGE UP (ref 22–29)
HCO3 BLDV-SCNC: 29 MMOL/L — SIGNIFICANT CHANGE UP (ref 22–29)
HCT VFR BLD CALC: 27.7 % — LOW (ref 34.5–45)
HCT VFR BLD CALC: 35.8 % — SIGNIFICANT CHANGE UP (ref 34.5–45)
HCT VFR BLDA CALC: 26 % — LOW (ref 34.5–46.5)
HEPARINASE TEG R TIME: 9.2 MIN (ref 4.3–8.3)
HGB BLD CALC-MCNC: 8.5 G/DL — LOW (ref 11.7–16.1)
HGB BLD-MCNC: 11 G/DL — LOW (ref 11.5–15.5)
HGB BLD-MCNC: 8.2 G/DL — LOW (ref 11.5–15.5)
HOROWITZ INDEX BLDV+IHG-RTO: 100 — SIGNIFICANT CHANGE UP
HYALINE CASTS # UR AUTO: 1 /LPF — SIGNIFICANT CHANGE UP (ref 0–2)
INR BLD: 1.88 RATIO — HIGH (ref 0.88–1.16)
INR BLD: 3.76 RATIO — HIGH (ref 0.88–1.16)
KETONES UR-MCNC: NEGATIVE — SIGNIFICANT CHANGE UP
KETONES UR-MCNC: NEGATIVE — SIGNIFICANT CHANGE UP
LACTATE BLDV-MCNC: 3.6 MMOL/L — HIGH (ref 0.5–2)
LEUKOCYTE ESTERASE UR-ACNC: ABNORMAL
LEUKOCYTE ESTERASE UR-ACNC: ABNORMAL
LIDOCAIN IGE QN: 21 U/L — SIGNIFICANT CHANGE UP (ref 7–60)
MAGNESIUM SERPL-MCNC: 1.8 MG/DL — SIGNIFICANT CHANGE UP (ref 1.6–2.6)
MCHC RBC-ENTMCNC: 23.1 PG — LOW (ref 27–34)
MCHC RBC-ENTMCNC: 23.5 PG — LOW (ref 27–34)
MCHC RBC-ENTMCNC: 29.6 GM/DL — LOW (ref 32–36)
MCHC RBC-ENTMCNC: 30.7 GM/DL — LOW (ref 32–36)
MCV RBC AUTO: 75.1 FL — LOW (ref 80–100)
MCV RBC AUTO: 79.4 FL — LOW (ref 80–100)
NITRITE UR-MCNC: NEGATIVE — SIGNIFICANT CHANGE UP
NITRITE UR-MCNC: NEGATIVE — SIGNIFICANT CHANGE UP
NRBC # BLD: 0 /100 WBCS — SIGNIFICANT CHANGE UP (ref 0–0)
NRBC # BLD: 5 /100 WBCS — HIGH (ref 0–0)
PCO2 BLDV: 52 MMHG — HIGH (ref 39–42)
PCO2 BLDV: 77 MMHG — HIGH (ref 39–42)
PH BLDV: 7.19 — CRITICAL LOW (ref 7.32–7.43)
PH BLDV: 7.28 — LOW (ref 7.32–7.43)
PH UR: 6 — SIGNIFICANT CHANGE UP (ref 5–8)
PH UR: 6 — SIGNIFICANT CHANGE UP (ref 5–8)
PHOSPHATE SERPL-MCNC: 3.5 MG/DL — SIGNIFICANT CHANGE UP (ref 2.5–4.5)
PLATELET # BLD AUTO: 216 K/UL — SIGNIFICANT CHANGE UP (ref 150–400)
PLATELET # BLD AUTO: 403 K/UL — HIGH (ref 150–400)
PO2 BLDV: 40 MMHG — SIGNIFICANT CHANGE UP (ref 25–45)
PO2 BLDV: 51 MMHG — HIGH (ref 25–45)
POTASSIUM BLDV-SCNC: 4.7 MMOL/L — SIGNIFICANT CHANGE UP (ref 3.5–5.1)
POTASSIUM SERPL-MCNC: 5.3 MMOL/L — SIGNIFICANT CHANGE UP (ref 3.5–5.3)
POTASSIUM SERPL-MCNC: 5.3 MMOL/L — SIGNIFICANT CHANGE UP (ref 3.5–5.3)
POTASSIUM SERPL-SCNC: 5.3 MMOL/L — SIGNIFICANT CHANGE UP (ref 3.5–5.3)
POTASSIUM SERPL-SCNC: 5.3 MMOL/L — SIGNIFICANT CHANGE UP (ref 3.5–5.3)
PROT SERPL-MCNC: 6.5 G/DL — SIGNIFICANT CHANGE UP (ref 6–8.3)
PROT SERPL-MCNC: 7.9 G/DL — SIGNIFICANT CHANGE UP (ref 6–8.3)
PROT UR-MCNC: ABNORMAL
PROT UR-MCNC: ABNORMAL
PROTHROM AB SERPL-ACNC: 22 SEC — HIGH (ref 10.5–13.4)
PROTHROM AB SERPL-ACNC: 44.2 SEC — HIGH (ref 10.5–13.4)
RAPIDTEG MAXIMUM AMPLITUDE: 70 MM — SIGNIFICANT CHANGE UP (ref 52–70)
RBC # BLD: 3.49 M/UL — LOW (ref 3.8–5.2)
RBC # BLD: 4.77 M/UL — SIGNIFICANT CHANGE UP (ref 3.8–5.2)
RBC # FLD: 25 % — HIGH (ref 10.3–14.5)
RBC # FLD: 26.4 % — HIGH (ref 10.3–14.5)
RBC CASTS # UR COMP ASSIST: 0 /HPF — SIGNIFICANT CHANGE UP (ref 0–4)
RBC CASTS # UR COMP ASSIST: 3 /HPF — SIGNIFICANT CHANGE UP (ref 0–4)
RH IG SCN BLD-IMP: POSITIVE — SIGNIFICANT CHANGE UP
SAO2 % BLDV: 53.3 % — LOW (ref 67–88)
SAO2 % BLDV: 73.5 % — SIGNIFICANT CHANGE UP (ref 67–88)
SODIUM SERPL-SCNC: 134 MMOL/L — LOW (ref 135–145)
SODIUM SERPL-SCNC: 141 MMOL/L — SIGNIFICANT CHANGE UP (ref 135–145)
SP GR SPEC: 1.02 — SIGNIFICANT CHANGE UP (ref 1.01–1.02)
SP GR SPEC: 1.03 — HIGH (ref 1.01–1.02)
TEG FUNCTIONAL FIBRINOGEN: 36.3 MM (ref 15–32)
TEG MAXIMUM AMPLITUDE: 69.5 MM (ref 52–69)
TEG REACTION TIME: 10.7 MIN (ref 4.6–9.1)
UROBILINOGEN FLD QL: ABNORMAL
UROBILINOGEN FLD QL: NEGATIVE — SIGNIFICANT CHANGE UP
WBC # BLD: 23.31 K/UL — HIGH (ref 3.8–10.5)
WBC # BLD: 5.46 K/UL — SIGNIFICANT CHANGE UP (ref 3.8–10.5)
WBC # FLD AUTO: 23.31 K/UL — HIGH (ref 3.8–10.5)
WBC # FLD AUTO: 5.46 K/UL — SIGNIFICANT CHANGE UP (ref 3.8–10.5)
WBC UR QL: 42 /HPF — HIGH (ref 0–5)
WBC UR QL: 67 /HPF — HIGH (ref 0–5)

## 2022-09-27 PROCEDURE — 99291 CRITICAL CARE FIRST HOUR: CPT | Mod: 24

## 2022-09-27 PROCEDURE — 99233 SBSQ HOSP IP/OBS HIGH 50: CPT

## 2022-09-27 PROCEDURE — 71045 X-RAY EXAM CHEST 1 VIEW: CPT | Mod: 26,77

## 2022-09-27 PROCEDURE — 71045 X-RAY EXAM CHEST 1 VIEW: CPT | Mod: 26

## 2022-09-27 PROCEDURE — 93306 TTE W/DOPPLER COMPLETE: CPT | Mod: 26

## 2022-09-27 PROCEDURE — 99232 SBSQ HOSP IP/OBS MODERATE 35: CPT

## 2022-09-27 RX ORDER — MEROPENEM 1 G/30ML
1000 INJECTION INTRAVENOUS EVERY 12 HOURS
Refills: 0 | Status: DISCONTINUED | OUTPATIENT
Start: 2022-09-27 | End: 2022-10-02

## 2022-09-27 RX ORDER — HYDROCORTISONE 20 MG
100 TABLET ORAL EVERY 8 HOURS
Refills: 0 | Status: DISCONTINUED | OUTPATIENT
Start: 2022-09-27 | End: 2022-09-29

## 2022-09-27 RX ORDER — ALBUTEROL 90 UG/1
1 AEROSOL, METERED ORAL EVERY 4 HOURS
Refills: 0 | Status: DISCONTINUED | OUTPATIENT
Start: 2022-09-27 | End: 2022-09-28

## 2022-09-27 RX ORDER — ALBUMIN HUMAN 25 %
250 VIAL (ML) INTRAVENOUS
Refills: 0 | Status: COMPLETED | OUTPATIENT
Start: 2022-09-27 | End: 2022-09-27

## 2022-09-27 RX ORDER — CALCIUM GLUCONATE 100 MG/ML
2 VIAL (ML) INTRAVENOUS ONCE
Refills: 0 | Status: DISCONTINUED | OUTPATIENT
Start: 2022-09-27 | End: 2022-09-27

## 2022-09-27 RX ORDER — MEROPENEM 1 G/30ML
INJECTION INTRAVENOUS
Refills: 0 | Status: DISCONTINUED | OUTPATIENT
Start: 2022-09-27 | End: 2022-09-27

## 2022-09-27 RX ORDER — ALBUMIN HUMAN 25 %
250 VIAL (ML) INTRAVENOUS ONCE
Refills: 0 | Status: COMPLETED | OUTPATIENT
Start: 2022-09-27 | End: 2022-09-27

## 2022-09-27 RX ORDER — HYDROCORTISONE 20 MG
50 TABLET ORAL EVERY 8 HOURS
Refills: 0 | Status: DISCONTINUED | OUTPATIENT
Start: 2022-09-27 | End: 2022-09-27

## 2022-09-27 RX ORDER — MEROPENEM 1 G/30ML
1000 INJECTION INTRAVENOUS EVERY 8 HOURS
Refills: 0 | Status: DISCONTINUED | OUTPATIENT
Start: 2022-09-27 | End: 2022-09-27

## 2022-09-27 RX ORDER — VANCOMYCIN HCL 1 G
1000 VIAL (EA) INTRAVENOUS ONCE
Refills: 0 | Status: COMPLETED | OUTPATIENT
Start: 2022-09-27 | End: 2022-09-27

## 2022-09-27 RX ORDER — IPRATROPIUM/ALBUTEROL SULFATE 18-103MCG
3 AEROSOL WITH ADAPTER (GRAM) INHALATION EVERY 6 HOURS
Refills: 0 | Status: DISCONTINUED | OUTPATIENT
Start: 2022-09-27 | End: 2022-09-28

## 2022-09-27 RX ORDER — INSULIN HUMAN 100 [IU]/ML
3 INJECTION, SOLUTION SUBCUTANEOUS
Qty: 100 | Refills: 0 | Status: DISCONTINUED | OUTPATIENT
Start: 2022-09-27 | End: 2022-10-07

## 2022-09-27 RX ORDER — FUROSEMIDE 40 MG
60 TABLET ORAL ONCE
Refills: 0 | Status: COMPLETED | OUTPATIENT
Start: 2022-09-27 | End: 2022-09-27

## 2022-09-27 RX ORDER — VANCOMYCIN HCL 1 G
1000 VIAL (EA) INTRAVENOUS EVERY 12 HOURS
Refills: 0 | Status: DISCONTINUED | OUTPATIENT
Start: 2022-09-27 | End: 2022-09-27

## 2022-09-27 RX ORDER — VANCOMYCIN HCL 1 G
1000 VIAL (EA) INTRAVENOUS EVERY 24 HOURS
Refills: 0 | Status: DISCONTINUED | OUTPATIENT
Start: 2022-09-27 | End: 2022-10-01

## 2022-09-27 RX ORDER — PHENYLEPHRINE HYDROCHLORIDE 10 MG/ML
1.5 INJECTION INTRAVENOUS
Qty: 40 | Refills: 0 | Status: DISCONTINUED | OUTPATIENT
Start: 2022-09-27 | End: 2022-09-28

## 2022-09-27 RX ORDER — DEXTROSE 50 % IN WATER 50 %
25 SYRINGE (ML) INTRAVENOUS ONCE
Refills: 0 | Status: COMPLETED | OUTPATIENT
Start: 2022-09-27 | End: 2022-09-27

## 2022-09-27 RX ORDER — DEXTROSE 50 % IN WATER 50 %
25 SYRINGE (ML) INTRAVENOUS
Refills: 0 | Status: DISCONTINUED | OUTPATIENT
Start: 2022-09-27 | End: 2022-09-27

## 2022-09-27 RX ORDER — MAGNESIUM SULFATE 500 MG/ML
2 VIAL (ML) INJECTION ONCE
Refills: 0 | Status: COMPLETED | OUTPATIENT
Start: 2022-09-27 | End: 2022-09-27

## 2022-09-27 RX ORDER — DOBUTAMINE HCL 250MG/20ML
1.5 VIAL (ML) INTRAVENOUS
Qty: 500 | Refills: 0 | Status: DISCONTINUED | OUTPATIENT
Start: 2022-09-27 | End: 2022-10-03

## 2022-09-27 RX ORDER — VASOPRESSIN 20 [USP'U]/ML
0.04 INJECTION INTRAVENOUS
Qty: 40 | Refills: 0 | Status: DISCONTINUED | OUTPATIENT
Start: 2022-09-27 | End: 2022-10-04

## 2022-09-27 RX ORDER — SODIUM BICARBONATE 1 MEQ/ML
50 SYRINGE (ML) INTRAVENOUS
Refills: 0 | Status: COMPLETED | OUTPATIENT
Start: 2022-09-27 | End: 2022-09-27

## 2022-09-27 RX ORDER — MEROPENEM 1 G/30ML
INJECTION INTRAVENOUS
Refills: 0 | Status: DISCONTINUED | OUTPATIENT
Start: 2022-09-27 | End: 2022-10-02

## 2022-09-27 RX ORDER — NOREPINEPHRINE BITARTRATE/D5W 8 MG/250ML
0.05 PLASTIC BAG, INJECTION (ML) INTRAVENOUS
Qty: 8 | Refills: 0 | Status: DISCONTINUED | OUTPATIENT
Start: 2022-09-27 | End: 2022-09-29

## 2022-09-27 RX ORDER — CALCIUM GLUCONATE 100 MG/ML
2 VIAL (ML) INTRAVENOUS ONCE
Refills: 0 | Status: COMPLETED | OUTPATIENT
Start: 2022-09-27 | End: 2022-09-27

## 2022-09-27 RX ORDER — METOCLOPRAMIDE HCL 10 MG
10 TABLET ORAL ONCE
Refills: 0 | Status: COMPLETED | OUTPATIENT
Start: 2022-09-27 | End: 2022-09-27

## 2022-09-27 RX ORDER — INSULIN HUMAN 100 [IU]/ML
10 INJECTION, SOLUTION SUBCUTANEOUS ONCE
Refills: 0 | Status: COMPLETED | OUTPATIENT
Start: 2022-09-27 | End: 2022-09-27

## 2022-09-27 RX ORDER — DEXTROSE 50 % IN WATER 50 %
50 SYRINGE (ML) INTRAVENOUS
Refills: 0 | Status: DISCONTINUED | OUTPATIENT
Start: 2022-09-27 | End: 2022-09-27

## 2022-09-27 RX ORDER — DEXTROSE 50 % IN WATER 50 %
50 SYRINGE (ML) INTRAVENOUS ONCE
Refills: 0 | Status: COMPLETED | OUTPATIENT
Start: 2022-09-27 | End: 2022-09-27

## 2022-09-27 RX ORDER — CALCIUM CHLORIDE
1000 POWDER (GRAM) MISCELLANEOUS ONCE
Refills: 0 | Status: COMPLETED | OUTPATIENT
Start: 2022-09-27 | End: 2022-09-27

## 2022-09-27 RX ORDER — MEROPENEM 1 G/30ML
1000 INJECTION INTRAVENOUS ONCE
Refills: 0 | Status: COMPLETED | OUTPATIENT
Start: 2022-09-27 | End: 2022-09-27

## 2022-09-27 RX ORDER — VECURONIUM BROMIDE 20 MG/1
10 INJECTION, POWDER, FOR SOLUTION INTRAVENOUS ONCE
Refills: 0 | Status: COMPLETED | OUTPATIENT
Start: 2022-09-27 | End: 2022-09-27

## 2022-09-27 RX ORDER — SODIUM CHLORIDE 9 MG/ML
1000 INJECTION INTRAMUSCULAR; INTRAVENOUS; SUBCUTANEOUS
Refills: 0 | Status: DISCONTINUED | OUTPATIENT
Start: 2022-09-27 | End: 2022-10-10

## 2022-09-27 RX ORDER — MEROPENEM 1 G/30ML
1000 INJECTION INTRAVENOUS ONCE
Refills: 0 | Status: DISCONTINUED | OUTPATIENT
Start: 2022-09-27 | End: 2022-09-27

## 2022-09-27 RX ORDER — METHYLENE BLUE 65 MG
70 TABLET ORAL ONCE
Refills: 0 | Status: COMPLETED | OUTPATIENT
Start: 2022-09-27 | End: 2022-09-27

## 2022-09-27 RX ORDER — SODIUM BICARBONATE 1 MEQ/ML
0.22 SYRINGE (ML) INTRAVENOUS
Qty: 150 | Refills: 0 | Status: DISCONTINUED | OUTPATIENT
Start: 2022-09-27 | End: 2022-09-28

## 2022-09-27 RX ORDER — CHLORHEXIDINE GLUCONATE 213 G/1000ML
15 SOLUTION TOPICAL EVERY 12 HOURS
Refills: 0 | Status: DISCONTINUED | OUTPATIENT
Start: 2022-09-27 | End: 2022-09-27

## 2022-09-27 RX ORDER — SODIUM ZIRCONIUM CYCLOSILICATE 10 G/10G
10 POWDER, FOR SUSPENSION ORAL ONCE
Refills: 0 | Status: COMPLETED | OUTPATIENT
Start: 2022-09-27 | End: 2022-09-27

## 2022-09-27 RX ORDER — SODIUM BICARBONATE 1 MEQ/ML
50 SYRINGE (ML) INTRAVENOUS ONCE
Refills: 0 | Status: COMPLETED | OUTPATIENT
Start: 2022-09-27 | End: 2022-09-27

## 2022-09-27 RX ADMIN — Medication 25 GRAM(S): at 11:00

## 2022-09-27 RX ADMIN — Medication 50 MILLIEQUIVALENT(S): at 14:40

## 2022-09-27 RX ADMIN — Medication 50 MILLIGRAM(S): at 14:42

## 2022-09-27 RX ADMIN — PHENYLEPHRINE HYDROCHLORIDE 38.1 MICROGRAM(S)/KG/MIN: 10 INJECTION INTRAVENOUS at 19:25

## 2022-09-27 RX ADMIN — Medication 125 MILLILITER(S): at 15:00

## 2022-09-27 RX ADMIN — MEXILETINE HYDROCHLORIDE 200 MILLIGRAM(S): 150 CAPSULE ORAL at 23:00

## 2022-09-27 RX ADMIN — FENTANYL CITRATE 25 MICROGRAM(S): 50 INJECTION INTRAVENOUS at 22:45

## 2022-09-27 RX ADMIN — Medication 125 MILLILITER(S): at 22:00

## 2022-09-27 RX ADMIN — Medication 0.5 MILLIGRAM(S): at 17:44

## 2022-09-27 RX ADMIN — MEROPENEM 100 MILLIGRAM(S): 1 INJECTION INTRAVENOUS at 12:19

## 2022-09-27 RX ADMIN — Medication 50 MILLIEQUIVALENT(S): at 22:02

## 2022-09-27 RX ADMIN — SODIUM CHLORIDE 3 MILLILITER(S): 9 INJECTION INTRAMUSCULAR; INTRAVENOUS; SUBCUTANEOUS at 13:01

## 2022-09-27 RX ADMIN — Medication 250 MILLIGRAM(S): at 12:53

## 2022-09-27 RX ADMIN — SODIUM ZIRCONIUM CYCLOSILICATE 10 GRAM(S): 10 POWDER, FOR SUSPENSION ORAL at 23:24

## 2022-09-27 RX ADMIN — Medication 125 MILLILITER(S): at 14:38

## 2022-09-27 RX ADMIN — Medication 125 MILLILITER(S): at 14:37

## 2022-09-27 RX ADMIN — Medication 70 MILLIGRAM(S): at 22:32

## 2022-09-27 RX ADMIN — INSULIN HUMAN 3 UNIT(S)/HR: 100 INJECTION, SOLUTION SUBCUTANEOUS at 14:40

## 2022-09-27 RX ADMIN — Medication 125 MILLILITER(S): at 17:45

## 2022-09-27 RX ADMIN — Medication 6: at 11:54

## 2022-09-27 RX ADMIN — INSULIN HUMAN 3 UNIT(S)/HR: 100 INJECTION, SOLUTION SUBCUTANEOUS at 19:26

## 2022-09-27 RX ADMIN — Medication 125 MILLILITER(S): at 21:30

## 2022-09-27 RX ADMIN — Medication 1 TABLET(S): at 12:37

## 2022-09-27 RX ADMIN — Medication 40 MILLIGRAM(S): at 08:30

## 2022-09-27 RX ADMIN — VECURONIUM BROMIDE 10 MILLIGRAM(S): 20 INJECTION, POWDER, FOR SOLUTION INTRAVENOUS at 14:39

## 2022-09-27 RX ADMIN — Medication 10 MILLIGRAM(S): at 05:17

## 2022-09-27 RX ADMIN — Medication 3 MILLILITER(S): at 17:35

## 2022-09-27 RX ADMIN — Medication 125 MILLILITER(S): at 14:02

## 2022-09-27 RX ADMIN — INSULIN HUMAN 10 UNIT(S): 100 INJECTION, SOLUTION SUBCUTANEOUS at 18:04

## 2022-09-27 RX ADMIN — Medication 250 MILLIGRAM(S): at 07:00

## 2022-09-27 RX ADMIN — Medication 500000 UNIT(S): at 18:04

## 2022-09-27 RX ADMIN — Medication 10.2 MICROGRAM(S)/KG/MIN: at 21:15

## 2022-09-27 RX ADMIN — Medication 25 MILLILITER(S): at 17:44

## 2022-09-27 RX ADMIN — SODIUM CHLORIDE 3 MILLILITER(S): 9 INJECTION INTRAMUSCULAR; INTRAVENOUS; SUBCUTANEOUS at 05:31

## 2022-09-27 RX ADMIN — Medication 3 MILLILITER(S): at 23:38

## 2022-09-27 RX ADMIN — Medication 1000 MILLIGRAM(S): at 22:02

## 2022-09-27 RX ADMIN — Medication 500000 UNIT(S): at 00:01

## 2022-09-27 RX ADMIN — Medication 3 MILLILITER(S): at 11:41

## 2022-09-27 RX ADMIN — Medication 675 MILLIGRAM(S): at 19:31

## 2022-09-27 RX ADMIN — Medication 25 MILLIGRAM(S): at 08:30

## 2022-09-27 RX ADMIN — Medication 50 MILLILITER(S): at 22:20

## 2022-09-27 RX ADMIN — Medication 100 MILLIGRAM(S): at 20:15

## 2022-09-27 RX ADMIN — Medication 6.35 MICROGRAM(S)/KG/MIN: at 19:54

## 2022-09-27 RX ADMIN — PANTOPRAZOLE SODIUM 40 MILLIGRAM(S): 20 TABLET, DELAYED RELEASE ORAL at 12:37

## 2022-09-27 RX ADMIN — CHLORHEXIDINE GLUCONATE 15 MILLILITER(S): 213 SOLUTION TOPICAL at 18:05

## 2022-09-27 RX ADMIN — Medication 6.35 MICROGRAM(S)/KG/MIN: at 14:36

## 2022-09-27 RX ADMIN — Medication 500000 UNIT(S): at 12:37

## 2022-09-27 RX ADMIN — MEXILETINE HYDROCHLORIDE 200 MILLIGRAM(S): 150 CAPSULE ORAL at 14:40

## 2022-09-27 RX ADMIN — Medication 50 MILLIEQUIVALENT(S): at 16:52

## 2022-09-27 RX ADMIN — VASOPRESSIN 6 UNIT(S)/MIN: 20 INJECTION INTRAVENOUS at 19:25

## 2022-09-27 RX ADMIN — PHENYLEPHRINE HYDROCHLORIDE 38.1 MICROGRAM(S)/KG/MIN: 10 INJECTION INTRAVENOUS at 14:36

## 2022-09-27 RX ADMIN — INSULIN HUMAN 10 UNIT(S): 100 INJECTION, SOLUTION SUBCUTANEOUS at 22:20

## 2022-09-27 RX ADMIN — MEROPENEM 100 MILLIGRAM(S): 1 INJECTION INTRAVENOUS at 21:15

## 2022-09-27 RX ADMIN — FENTANYL CITRATE 25 MICROGRAM(S): 50 INJECTION INTRAVENOUS at 23:15

## 2022-09-27 RX ADMIN — Medication 125 MILLILITER(S): at 12:00

## 2022-09-27 RX ADMIN — Medication 8 MILLIGRAM(S): at 08:30

## 2022-09-27 RX ADMIN — Medication 675 MILLIGRAM(S): at 19:01

## 2022-09-27 RX ADMIN — Medication 60 MILLIGRAM(S): at 23:45

## 2022-09-27 RX ADMIN — Medication 200 GRAM(S): at 11:00

## 2022-09-27 RX ADMIN — Medication 50 MILLIEQUIVALENT(S): at 21:50

## 2022-09-27 NOTE — PROGRESS NOTE ADULT - PROBLEM SELECTOR PLAN 2
On chronic steroids at home: medrol 8mg qd   Spoke to attending endocrinologist Dr Sherwood regarding pt's clinical condition recommending the following:  Discontinue prednisone 8mg po qd per primary team  Start stress steroid dose of Hydrocortisone 50mg  q8h while on pressors. Once pt is off pressors will start slow taper back to oral Prednisone chronic dose.

## 2022-09-27 NOTE — PROGRESS NOTE ADULT - ASSESSMENT
71F w/h/o uncontrolled T2DM (A1C 9.4%) on basal/bolus insulin PTA. Unknown DM complications. Also COPD, secondary adrenal Insufficiency on chronic steroids, colorectal cancer s/p resection (colostomy bag), Chronic A fib on Eliquis, and tracheomalacia and multiple intubations, recent dx of OM presented to ED at [facility] with epigastric pain, belching and central chest pain. Found on CTA to have type A aortic dissection and transferred to [facility] for surgical evaluation by Dr. Cabrera. Now s/p aortic dissection repair on 9/07/22. Endocrine consulted for assistance with uncontrolled DM/steroids. Pt with emesis while on overnight TFs, hyperglycemia and leukocytosis and decompensation requiring ICU transfer. Now intubated, on pressors and off TFs for possible aspiration/sepsis. On chronic Prednisone for adrenal insufficiency Agree with initiation of insulin drip for glycemic control and while on pressors and critically ill to keep BG goal (100-180mg/dl). Also needs stress steroid dose for adrenal insufficiency.

## 2022-09-27 NOTE — CHART NOTE - NSCHARTNOTEFT_GEN_A_CORE
Anesthesiologist Floor Intubation Note    Called to see patient due to respiratory failure 2/2 likely aspiration event. On arrival, pt noted to be tachypneic on high flow nasal cannula.   On arrival vitals were HR 120s, BP: 160s/80s, SpO2 100%, patient preoxygenated with high flow nasal cannula at 100% FiO2 and with ambu bag. Pt induced with 16 mg etomidate, 100 mg rocuronium, 200 mcg phenylephrine. Laryngoscopy performed with glidescope 3 blade, atraumatic intubation with 8.0 ETT secured at 20 cm at the gum.     Following intubation and initiation of PPV, patients blood pressure dropped to SBP of 40s. Femoral pulse palpable. Pt given epinephrine with improvement in blood pressure. Pt maintained with phenylephrine gtt and boluses as femoral central line placed by ICU team. Once the femoral central line was in place pt transitioned to levophed gtt, propofol gtt. Further management per CTU team.

## 2022-09-27 NOTE — PROGRESS NOTE ADULT - SUBJECTIVE AND OBJECTIVE BOX
Follow Up:  fever    Interval History/ROS: pt had vomiting and then respiratory distress, now intubated with increased WBC        Allergies  aspirin (Short breath)  Avelox (Short breath; Pruritus)  cefepime (Anaphylaxis)  codeine (Short breath)  Dilaudid (Short breath)  iodine (Short breath; Swelling)  penicillin (Anaphylaxis)  shellfish (Anaphylaxis)  tetanus toxoid (Short breath)  Valium (Short breath)        ANTIMICROBIALS:  meropenem  IVPB 1000 once  meropenem  IVPB 1000 every 12 hours  meropenem  IVPB    nystatin    Suspension 010210 every 6 hours  vancomycin  IVPB 1000 every 24 hours      OTHER MEDS:  acetaminophen    Suspension .. 675 milliGRAM(s) Oral every 6 hours PRN  albuterol/ipratropium for Nebulization 3 milliLiter(s) Nebulizer every 12 hours PRN  albuterol/ipratropium for Nebulization 3 milliLiter(s) Nebulizer every 6 hours  apixaban 5 milliGRAM(s) Oral every 12 hours  buDESOnide    Inhalation Suspension 0.5 milliGRAM(s) Inhalation every 12 hours  chlorhexidine 0.12% Liquid 15 milliLiter(s) Oral Mucosa every 12 hours  chlorhexidine 2% Cloths 1 Application(s) Topical <User Schedule>  collagenase Ointment 1 Application(s) Topical daily  dextrose 50% Injectable 50 milliLiter(s) IV Push every 15 minutes  furosemide    Tablet 40 milliGRAM(s) Oral daily  insulin lispro (ADMELOG) corrective regimen sliding scale   SubCutaneous three times a day before meals  insulin lispro (ADMELOG) corrective regimen sliding scale   SubCutaneous <User Schedule>  insulin lispro Injectable (ADMELOG) 12 Unit(s) SubCutaneous before breakfast  insulin lispro Injectable (ADMELOG) 10 Unit(s) SubCutaneous before lunch  insulin lispro Injectable (ADMELOG) 8 Unit(s) SubCutaneous before dinner  insulin NPH human recombinant 8 Unit(s) SubCutaneous <User Schedule>  insulin NPH human recombinant 25 Unit(s) SubCutaneous <User Schedule>  metoprolol tartrate 25 milliGRAM(s) Oral every 8 hours  mexiletine 200 milliGRAM(s) Oral every 8 hours  montelukast 10 milliGRAM(s) Oral at bedtime  multivitamin 1 Tablet(s) Oral daily  pantoprazole  Injectable 40 milliGRAM(s) IV Push daily  predniSONE   Tablet 8 milliGRAM(s) Oral daily  QUEtiapine 25 milliGRAM(s) Oral at bedtime  simethicone drops 80 milliGRAM(s) Oral every 12 hours PRN  sodium chloride 0.9% lock flush 3 milliLiter(s) IV Push every 8 hours  sodium chloride 0.9%. 1000 milliLiter(s) IV Continuous <Continuous>      Vital Signs Last 24 Hrs  T(C): 37.4 (27 Sep 2022 06:40), Max: 37.9 (26 Sep 2022 19:11)  T(F): 99.3 (27 Sep 2022 06:40), Max: 100.2 (26 Sep 2022 19:11)  HR: 132 (27 Sep 2022 10:13) (96 - 132)  BP: 153/98 (27 Sep 2022 05:54) (116/66 - 153/98)  BP(mean): 83 (27 Sep 2022 03:07) (83 - 97)  RR: 22 (27 Sep 2022 07:49) (16 - 22)  SpO2: 98% (27 Sep 2022 10:13) (94% - 100%)    Parameters below as of 27 Sep 2022 07:49  Patient On (Oxygen Delivery Method): nasal cannula, high flow  O2 Flow (L/min): 50  O2 Concentration (%): 50    Physical Exam:  General:   intubated, sedated  Respiratory:  clear b/l,    no wheezing  abd:     soft,   BS +,   no tenderness, ostomy with liquid brown stool  :   no CVAT,  no suprapubic tenderness,   no  ramirez  Musculoskeletal:   no joint swelling  vascular: R chest port with no tenderness or erythema                          11.0   23.31 )-----------( 403      ( 27 Sep 2022 07:48 )             35.8           134<L>  |  97  |  39<H>  ----------------------------<  388<H>  5.3   |  20<L>  |  1.08    Ca    9.6      27 Sep 2022 07:48  Phos  3.5       Mg     1.8         TPro  7.9  /  Alb  3.3  /  TBili  0.4  /  DBili  x   /  AST  17  /  ALT  27  /  AlkPhos  177<H>        Urinalysis Basic - ( 27 Sep 2022 03:19 )    Color: Yellow / Appearance: Clear / S.031 / pH: x  Gluc: x / Ketone: Negative  / Bili: Negative / Urobili: 2 mg/dL   Blood: x / Protein: 30 mg/dL / Nitrite: Negative   Leuk Esterase: Large / RBC: 0 /hpf / WBC 42 /HPF   Sq Epi: x / Non Sq Epi: 1 /hpf / Bacteria: Negative        MICROBIOLOGY:  v  .Blood Blood  22   No Growth Final  --  --      Catheterized Catheterized  22   50,000 - 99,000 CFU/mL Candida albicans "Susceptibilities not performed"  --  --      .Blood Blood-Peripheral  22   No Growth Final  --  --      .Blood Blood  22   No Growth Final  --  --      .Blood Blood  09-10-22   No Growth Final  --  --      Catheterized Catheterized  09-10-22   <10,000 CFU/mL Normal Urogenital Jessica  --  --      .Sputum Sputum  22   Numerous Proteus mirabilis  Unable to evaluate further due to Proteus overgrowth  --  Proteus mirabilis                RADIOLOGY:  Images independently visualized and reviewed personally, findings as below  < from: Xray Chest 1 View- PORTABLE-Urgent (Xray Chest 1 View- PORTABLE-Urgent .) (22 @ 11:11) >  IMPRESSION:    Trace bilateral pleural effusions. The lungs are otherwise clear.    < end of copied text >

## 2022-09-27 NOTE — PROVIDER CONTACT NOTE (OTHER) - ACTION/TREATMENT ORDERED:
[facility] ASSESS PATIENT AND RECOMMEND MEDICATION, TREATMENT AND DRESSING. SEE EMAR AND ORDER SET (PROVIDER TO RN)
Sean Ivey NP and Bhavin Watkins NP at bedside. Repeat Xray ordered. Insulin gtt started. Vanco ordered. NS ordered. ABG performed. Pt trasnferred to higher level of care.
NP at bedside. To hold PO meds for now. Rpt Xray ordered. Meds ordered.
PATIENT CONTINUES TO BE TURNED AND POSITIONED Q2H TO CONTINUE TO PROMOTE HEALTHY SKIN AND REDUCE RISK OF SKIN INJURY. NO FURTHER TREAMENT NEEDED DUE TO NO EVIDENCE OF SKIN INJURY.

## 2022-09-27 NOTE — PROGRESS NOTE ADULT - SUBJECTIVE AND OBJECTIVE BOX
Patient seen and examined at the bedside.    Remained critically ill on continuous ICU monitoring.    OBJECTIVE:  Vital Signs Last 24 Hrs  ICU Vital Signs Last 24 Hrs  T(C): 35.8 (27 Sep 2022 16:00), Max: 37.9 (26 Sep 2022 19:11)  T(F): 96.5 (27 Sep 2022 16:00), Max: 100.2 (26 Sep 2022 19:11)  HR: 87 (27 Sep 2022 17:00) (81 - 132)  BP: 105/34 (27 Sep 2022 16:15) (85/60 - 153/98)  BP(mean): 55 (27 Sep 2022 16:15) (48 - 97)  ABP: 105/53 (27 Sep 2022 17:00) (81/37 - 165/80)  ABP(mean): 69 (27 Sep 2022 17:00) (48 - 81)  RR: 28 (27 Sep 2022 17:00) (16 - 33)  SpO2: 96% (27 Sep 2022 16:00) (94% - 100%)    O2 Parameters below as of 27 Sep 2022 16:00  Patient On (Oxygen Delivery Method): ventilator        Physical Exam:   General:  elderly female, OOBTC, NAD  Neurology: intubated and sedated   ENT/Neck: Neck supple, trachea midline, No JVD  Respiratory: rhonchi throughout    CV: S1S2, no murmurs        [x] Sinus Tach   Abdominal: Soft, NT, ND, colostomy in place  Extremities: trace pedal edema noted, + peripheral pulses   Skin: Dry dressing over right heel, no Rashes, Hematoma, Ecchymosis    Assessment:  71F PMH DM, COPD, Chronic Adrenal Insufficiency on Chronic prednisone, history of colorectal cancer s/p resection (colostomy bag), Hx of CAD, Chronic A fib on Eliquis, and tracheomalacia and multiple intubations, recent dx of OM presented to ED at [facility] this morning with epigastric pain, belching and central chest pain. Found on CTA to have type A aortic dissection and transferred to [facility] for surgical evaluation by Dr. Cabrera.     Ascending aortic dissection s/p modified bentall and hemiarch on 9/6   Hypovolemic shock   Post op respiratory insufficiency  Acute blood loss anemia  Stress hyperglycemia   Lactic Acidosis  RIJ thrombus  Pancreatic cyst    Proteus PNA  Leukocytosis      Plan:   ***Neuro***  [x] Nonfocal   Post operative neuro assessment   Continue Seroquel  Sedated with propofol    ***Cardiovascular***  9/27: Patient returned to ICU for presumed sepsis, presenting with tachypnea, fevers, tachycardia  IV vasopressors (phenylephrine, levophed, vasopressin) started to maintain MAP > 65  CT Chest on 9/12:  Status post recent ascending aortic dissection repair. Small amount of fluid surrounding the ascending aorta without evidence of enhancing wall to suggest abscess.  RUE duplex on 9/12: There is a thrombosed and occluded RIJ   Invasive hemodynamic monitoring, assess perfusion indices   SR /MAP 77/Hct 38.6%/ Lactate 2.1   Lopressor held   Continue Mexiletine    [x] AC therapy with Eliquis held at this time       ***Pulmonary***  CT Chest on 9/12: Bilateral lower lobe atelectasis with bilateral pleural effusions   Reintubated for change in mental status on 9/8, self extubated on 9/9 and reintubated again, extubated to HFO2 on 9/13 9/27 Intubated for airway protection (high peak/plateau pressures on volume control. Switched to Pressure control.)  Hx of COPD / Continue bronchodilators and Prednisone   Monitor secretions and suction PRN     Mode: PS (Pressure Support)/ Spontaneous  FiO2: 100  PEEP: 5  ITime: 0.8  MAP: 15  PC: 28  PIP: 32                ***GI***  CT A/P on 9/12: Mild thickening of the cecum and ascending colon possibly representing mild nonspecific proximal colitis. Hepatic cirrhosis. The focal IPMN of the pancreas with large cyst within the tail the pancreas measuring 3.1 cm.  Colostomy bag in place, continue to monitor output   [x] Protonix   Passed FEES on 9/20, CC diet with reg liquids, pt with decreased appetite need to encourage PO intake  9/27: NPO at this time. c/f possible aspiration after multiple episodes of vomiting 9/26    ***Renal***  Continue to monitor I/Os, BUN/Creatinine.   Replete lytes PRN  Amezcua placed       ***ID***  SCx on 9/8 +Proteus mirabilis   UA on 9/9+ LE, WBC 60, few yeast  /  UCx on 9/10 NG   BCx on 9/10 NGTD, BCx on 9/12 NGTD 9/16 bcx NGTD  Nystatin for thrush   Diflucan added on 9/19 for Yeast found in Urine on 9/16  Broad spectrum antibiotics (meropenem and vancomycin started for presumed sepsis) 9/27      ***Endocrine***  [x]  DM : HbA1c 9.4%                - [x] Insulin gtt              - Need tight glycemic control to prevent wound infection.  Endo following for recs now that patient has some PO intake with tube feed supplementation         Patient requires continuous monitoring with bedside rhythm monitoring, pulse oximetry monitoring, and continuous central venous and arterial pressure monitoring; and intermittent blood gas analysis. Care plan discussed with the ICU care team.   Patient remained critical, at risk for life threatening decompensation.    I have spent 75 minutes providing critical care management to this patient.      I, Edson Chris NP, personally performed the services described in this documentation. all medical record entries made by the scribe were at my direction and in my presence. I have reviewed the chart and agree that the record reflects my personal performance and is accurate and complete  Electronically signed: Edson Chris NP

## 2022-09-27 NOTE — PROGRESS NOTE ADULT - SUBJECTIVE AND OBJECTIVE BOX
[facility] DIVISION OF PULMONARY, CRITICAL CARE and SLEEP MEDICINE  PULMONARY PROGRESS NOTE  Followup for Dr. Eulalio Allison    PATIENT INFORMATION:  NAME: RAYRAY RODRIGUEZ:  MRN: [id number]    CHIEF COMPLAINT: Patient is a 71y old  Female who presents with a chief complaint of Type A aortic dissection (27 Sep 2022 12:15)      [x] INITIAL CONSULT, H&P, FAMILY HISTORY and PAST MEDICAL AND SURGICAL HISTORY REVIEWED    OVERNIGHT EVENTS or CHANGES TO HPI:   - Vomiting with aspiration this AM - emergently intubated by CTICU/anesthesia with subsequent hypotension requiring vasopressors  - Was on an insulin gtt  - Now in PICU/CTICU     ========================REVIEW OF SYSTEMS========================  CONSTITUTIONAL:  CARDIOVASCULAR:  PULMONARY:  [] REMAINING REVIEW OF SYSTEMS NEGATIVE  [x] UNABLE TO OBTAIN REVIEW OF SYSTEMS DUE TO sedation/encephalopathy    ========================MEDICATIONS=============================  MEDICATIONS  (STANDING):  albuterol/ipratropium for Nebulization 3 milliLiter(s) Nebulizer every 6 hours  apixaban 5 milliGRAM(s) Oral every 12 hours  buDESOnide    Inhalation Suspension 0.5 milliGRAM(s) Inhalation every 12 hours  chlorhexidine 0.12% Liquid 15 milliLiter(s) Oral Mucosa every 12 hours  chlorhexidine 2% Cloths 1 Application(s) Topical <User Schedule>  collagenase Ointment 1 Application(s) Topical daily  dextrose 50% Injectable 50 milliLiter(s) IV Push every 15 minutes  furosemide    Tablet 40 milliGRAM(s) Oral daily  insulin lispro (ADMELOG) corrective regimen sliding scale   SubCutaneous three times a day before meals  insulin lispro (ADMELOG) corrective regimen sliding scale   SubCutaneous <User Schedule>  insulin lispro Injectable (ADMELOG) 12 Unit(s) SubCutaneous before breakfast  insulin lispro Injectable (ADMELOG) 10 Unit(s) SubCutaneous before lunch  insulin lispro Injectable (ADMELOG) 8 Unit(s) SubCutaneous before dinner  insulin NPH human recombinant 25 Unit(s) SubCutaneous <User Schedule>  insulin NPH human recombinant 8 Unit(s) SubCutaneous <User Schedule>  meropenem  IVPB 1000 milliGRAM(s) IV Intermittent every 12 hours  meropenem  IVPB      metoprolol tartrate 25 milliGRAM(s) Oral every 8 hours  mexiletine 200 milliGRAM(s) Oral every 8 hours  montelukast 10 milliGRAM(s) Oral at bedtime  multivitamin 1 Tablet(s) Oral daily  nystatin    Suspension 931402 Unit(s) Oral every 6 hours  pantoprazole  Injectable 40 milliGRAM(s) IV Push daily  predniSONE   Tablet 8 milliGRAM(s) Oral daily  QUEtiapine 25 milliGRAM(s) Oral at bedtime  sodium chloride 0.9% lock flush 3 milliLiter(s) IV Push every 8 hours  sodium chloride 0.9%. 1000 milliLiter(s) (50 mL/Hr) IV Continuous <Continuous>  vancomycin  IVPB 1000 milliGRAM(s) IV Intermittent every 24 hours      MEDICATIONS  (PRN):  acetaminophen    Suspension .. 675 milliGRAM(s) Oral every 6 hours PRN Moderate Pain (4 - 6)  albuterol/ipratropium for Nebulization 3 milliLiter(s) Nebulizer every 12 hours PRN Shortness of Breath and/or Wheezing  simethicone drops 80 milliGRAM(s) Oral every 12 hours PRN Gas      ========================PHYSICAL EXAM============================    VITALS: ICU Vital Signs Last 24 Hrs  T(C): 37.4 (27 Sep 2022 06:40), Max: 37.9 (26 Sep 2022 19:11)  T(F): 99.3 (27 Sep 2022 06:40), Max: 100.2 (26 Sep 2022 19:11)  HR: 113 (27 Sep 2022 11:49) (96 - 132)  BP: 153/98 (27 Sep 2022 05:54) (116/66 - 153/98)  BP(mean): 83 (27 Sep 2022 03:07) (83 - 97)  RR: 22 (27 Sep 2022 07:49) (16 - 22)  SpO2: 98% (27 Sep 2022 11:49) (94% - 100%)    O2 Parameters below as of 27 Sep 2022 11:49  Patient On (Oxygen Delivery Method): ventilator    INTAKE and OUTPUT: I&O's Summary    26 Sep 2022 07:01  -  27 Sep 2022 07:00  --------------------------------------------------------  IN: 973 mL / OUT: 875 mL / NET: 98 mL    27 Sep 2022 07:01  -  27 Sep 2022 12:35  --------------------------------------------------------  IN: 350 mL / OUT: 0 mL / NET: 350 mL    VENTILATOR SETTINGS: Mode: AC/ CMV (Assist Control/ Continuous Mandatory Ventilation)  RR (machine): 16  TV (machine): 400  FiO2: 100  PEEP: 0  ITime: 1  MAP: 8  PIP: 30      GENERAL: sedated  EYES: anicteric  EAR/NOSE/MOUTH/THROAT: NCAT, MMM, nares clear, ETT and NGT in place  NECK: supple   CARDIOVASCULAR: tachycardic, S1S2  RESPIRATORY: CTA bilateral bases, no wheeze, on full mechanical ventilatory support  ABDOMEN: soft, NT, ND  EXTREMITIES: digital clubbing, no cyanosis  SKIN: warm   MUSCULOSKELETAL: unable to assess  PSYCHIATRIC: sedated    ========================LABORATORY RESULTS AND IMAGING=============                        11.0   23.31 )-----------( 403      ( 27 Sep 2022 07:48 )             35.8                                                    09-27    134<L>  |  97  |  39<H>  ----------------------------<  388<H>  5.3   |  20<L>  |  1.08    Ca    9.6      27 Sep 2022 07:48  Phos  3.5     09-27  Mg     1.8     09-27    TPro  7.9  /  Alb  3.3  /  TBili  0.4  /  DBili  x   /  AST  17  /  ALT  27  /  AlkPhos  177<H>  09-27      ABG - ( 27 Sep 2022 10:11 )  pH, Arterial: 7.28  pH, Blood: x     /  pCO2: 48    /  pO2: 118   / HCO3: 23    / Base Excess: -4.2  /  SaO2: 98.0        Creatinine Trend: 1.08<--, 0.76<--, 0.78<--, 0.94<--, 0.84<--, 0.70<--    CT CHEST:   < from: CT Chest w/ IV Cont (09.12.22 @ 16:26) >  FINDINGS:  CHEST:  LUNGS AND LARGE AIRWAYS: Endotracheal tube in place above the level of   the balwinder. There is bilateral dependent atelectasis.  PLEURA: Small bilateral pleural effusions.  VESSELS: Patient is status post repair of an ascending aortic dissection.   There is a persistent dissection flap above the level of the repair   within the arch of the aorta at the level of the RIGHT innominate artery.   No dissection flap is seen within the great vessels. There is a   persistent dissection flap along the RIGHT lateral aspect of the arch of   the aorta extending into the descending aorta. There is partial   thrombosis of the false lumen within the proximal descending aorta (3-64).  HEART: Heart size is normal. There is low-density fluid surrounding the   ascending aorta at the site of the repair. Small amount of fluid is also   seen in the anterior mediastinum consistent with recent surgical   procedure. Internal pacing wires are also identified.  MEDIASTINUM AND MARANDA: No lymphadenopathy.  CHEST WALL AND LOWER NECK: Within normal limits.    ABDOMEN AND PELVIS:  LIVER: Nodular contour suggests cirrhosis.  BILE DUCTS: Normal caliber.  GALLBLADDER: Within normal limits.  SPLEEN: Smallspleen.  PANCREAS: There is a cystic lesion within the tail the pancreas unchanged   from prior study measuring 3.1 cm. Additional multifocal cystic lesions   within the pancreas are consistent with multifocal side branch IPMN.  ADRENALS: Within normal limits.  KIDNEYS/URETERS: No evidence of hydronephrosis.    BLADDER: Amezcua catheter in decompressed bladder.  REPRODUCTIVE ORGANS: Obscured from streak artifact from plate and screws   affixing a pubic symphysis fracture. Probable hysterectomy.    BOWEL: Left-sided descending colon colostomy. Moderate fecal burden   throughout the visualized colon. Mild wall thickening of the cecum. NG   tube is seen within the stomach. Appendix is not visualized. No evidence   of inflammation in the pericecal region.  PERITONEUM: No ascites.  VESSELS: The dissection flap continues into the abdominal aorta with the   celiac and SMA arising from the true lumen. Both renal vessels appear to   arise from the true lumen. The dissection flap may enter the proximal   portion of the LEFT renal artery. The dissection flap extends slightly   into the proximal LEFT common iliac. There is poor visualization of the   proximal portion of the RIGHT common iliac.  RETROPERITONEUM/LYMPH NODES: No lymphadenopathy.  ABDOMINAL WALL: Small fat-containing periumbilical hernia.  BONES: Screw is seen across a RIGHT iliac and sacral healed fracture.   There is also plate and screws bridging and bilateral pubic ramus   fracture.    IMPRESSION:  1.  Status post recent ascending aortic dissection repair. Small amount   of fluid surrounding the ascending aorta without evidence of enhancing   wall to suggest abscess.  2.  Bilateral lower lobe atelectasis with bilateral pleural effusions.  3.  Mild thickening of the cecum and ascending colon possibly   representing mild nonspecific proximal colitis.  4.  Hepatic cirrhosis.  5.  The focal IPMN of the pancreas with large cyst within the tail the   pancreas measuring 3.1 cm. Follow-up recommended.    < end of copied text >    CXR: < from: Xray Chest 1 View- PORTABLE-Urgent (Xray Chest 1 View- PORTABLE-Urgent .) (09.27.22 @ 11:11) >  FINDINGS:    Right chest wall port with the tip in the SVC. Endotracheal tube with the   tip in the mid trachea.    Trace bilateral pleural effusions. The lungs are otherwise clear.    Heart size cannot be accurately assessed on this projection. Median   sternotomy.    No acute osseous findings.      IMPRESSION:    Trace bilateral pleural effusions. The lungs are otherwise clear.    < end of copied text >      [] RADIOLOGY REVIEWED AND INTERPRETED BY ME      THANK YOU FOR ALLOWING US TO PARTICIPATE IN THE CARE OF THIS PATIENT

## 2022-09-27 NOTE — PROGRESS NOTE ADULT - TIME BILLING
review of records/results, pulmonary evaluation and assessment, and discussion with medical providers

## 2022-09-27 NOTE — CHART NOTE - NSCHARTNOTEFT_GEN_A_CORE
Pt Wolf brought from step down unit to PICU for additional monitoring and concern for aspiration. Upon arrival to the PICU, patient noted to be tachypenic, tachycardic, and febrile. Arterial line for invasive hemodynamic monitoring was placed and patient was noted to be hemodynamically stable. High flow nasal canula was ordered for a noted hypoxemia. Patient remained on high flow and was mentating and conversing with staff although remained lethargic. Patient was noted around 0945 was noted to be more lethargic with a decreased responsiveness. Decision was made to intubate for airway protection. Cardiac anesthesia was called to bedside to facilitate intubation. Induction was performed with etomidate and rocuronium. Glidescope intubate was successful on first attempt. Patient became hypotensive for a short period of time but responded to IV vasopressors. An emergent right groin central line catheter was placed to facilitate further hemodynamic management. Family made aware. Pt Wolf brought from step down unit to PICU for additional monitoring and concern for aspiration. Upon arrival to the PICU, patient noted to be tachypenic, tachycardic, and febrile. Arterial line for invasive hemodynamic monitoring was placed and patient was noted to be hemodynamically stable. High flow nasal canula was ordered for a noted hypoxemia. Patient remained on high flow and was mentating and conversing with staff although remained lethargic. Patient was noted around 0945 was noted to be more lethargic with a decreased responsiveness. Decision was made to intubate for airway protection. Cardiac anesthesia was called to bedside to facilitate intubation. Induction was performed with etomidate and rocuronium. Glidescope intubate was successful on first attempt. Patient became hypotensive for a short period of time but responded to IV vasopressors. An emergent right groin central line catheter was placed to facilitate further hemodynamic management. Iv antibiotics ordered for presumed sepsis. IV resuscitation initiated as well. Family made aware.

## 2022-09-27 NOTE — PROGRESS NOTE ADULT - ASSESSMENT
71 f with DM, CAD, a-fib, asthma/COPD, Chronic Adrenal Insufficiency on steroids, history of colorectal cancer s/p resection and ostomy, tracheomalacia and multiple intubations, recent admission for sepsis, foot osteo and abscess s/p debridement and OR cx with MRSA, ESBL proteus and corynebacterium s/p 6 weeks of vanco and ertapenem which ended 8/17, now p/w chest pain, found to have  type A aortic dissection, s/p modified Bentall and hemiarch on 9/6/22.   Post op course complicated by shock, respiratory failure, anemia and hyperglycemia.   fever started 9/9, sputum cx with proteus mirabilis    fever post op for aortic dissection, sputum cx with proteus, pt was still febrile on zosyn but last wound cx that showed proteus was ESBL, switched to suraj and still persistently febrile and the proteus now is pan sensitive, chest /abd CT 9/12 with small fluid around the ascending aorta but no enhancing or abscess, b/l lower lobe atelectasis, ?mild colitis  doppler: thrombosed and occluded RIJ  adrenal insufficiency, was on prednisone, was given dexa 9/12 and 9/13 and no more fevers until 16th, extubated 9/13 so the persistent fevers could be in the setting of adrenal insufficiency as imaging and cx negative, pt was extubated and no focal symptoms, completed a course of suraj  blood and urine cx negative, ALT, AST increased today to 200s, no bili or ALK, unlikely due to suraj  penicillin and cefepime allergy in chart but pt has received cefepime and ceftriaxone before  new vomiting and respiratory distress, ?aspiration was intubated, increased WBC  * f/u the blood cx  * send sputum cx  * c/w suraj for now  * if worsening status will need chest/abd/pelvis CT  The above assessment and plan was discussed with CTU    Michelle Rolon MD  contact on teams  After 5pm and on weekends call [phone]

## 2022-09-27 NOTE — PROVIDER CONTACT NOTE (CRITICAL VALUE NOTIFICATION) - ACTION/TREATMENT ORDERED:
Insulin drip increased and rebolused as per CTU Insulin Protocol
Start insulin gtt @3cc/hr per Sean Ivey NP.

## 2022-09-27 NOTE — CHART NOTE - NSCHARTNOTEFT_GEN_A_CORE
Nutrition Follow Up Note  Patient seen for: calorie count results/nutrition follow up.    Chart reviewed, events noted. Pt is a 71F w/h/o uncontrolled T2DM (A1C 9.4%) on basal/bolus insulin PTA. Unknown DM complications. Also COPD, secondary adrenal Insufficiency on chronic steroids, colorectal cancer s/p resection (colostomy bag), Chronic A fib on Eliquis, and tracheomalacia and multiple intubations, recent dx of OM presented to ED at [facility] with epigastric pain, belching and central chest pain. Found on CTA to have type A aortic dissection and transferred to [facility] for surgical evaluation by Dr. Cabrera. Now POD# 16 Type A aortic dissection repair.     Interim Events:   - Noted pt with episode of emesis last night as well as emesis this AM while receiving nocturnal TF. Feeds held as of 5AM. ABGs obtained, lactate elevated and BG >400mg/dL. Insulin gtt restarted and pt transferred to PICU where pt re-intubated. Presumed aspiration event.    Source: [] Patient       [x] EMR        [] RN        [] Family at bedside       [x] Other: team    -If unable to interview patient: [x] Trach/Vent/BiPAP  [] Disoriented/confused/inappropriate to interview    Diet Order:   Diet, Consistent Carbohydrate/No Snacks:   DASH/TLC {Sodium & Cholesterol Restricted} (DASH)  Tube Feeding Modality: Nasogastric  Glucerna 1.5 Chago (GLUCERNA1.5RTH)  Total Volume for 24 Hours (mL): 400  Intermittent  Starting Tube Feed Rate {mL per Hour}: 40  Until Goal Tube Feed Rate (mL per Hour): 40  Tube Feeding Hours ON: 10  Tube Feeding OFF (Hours): 14  Tube Feed Start Time: 20:00  Supplement Feeding Modality:  Oral  Glucerna Shake Cans or Servings Per Day:  3       Frequency:  Daily (22)    EN Order Provides: 400ml total volume, 600kcal, 33gm protein and 303ml free water. Meets 33% low-end estimated needed nutrition needs and ~42% low-end estimated protein needs.    Current Pump Rate: N/A  EN provision per flow sheets:   - : 360mL   - : 400mL   - : none noted    Is current diet order appropriate/adequate? [] Yes  [x]  No:     PO Intake: Unable to locate calorie count after transfer/intubation. Per endocrinology noted pt with fair PO intake on calorie count while receiving nocturnal TF. Drinking Glucerna Shakes.    Nutrition-related concerns:   - Pt currently intubated, not currently ordered for sedation however per chart notes pt received propofol as well as phenylephrine/levo during intubation.   - Endocrinology following for BG management, pt requiring insulin gtt intermittently.     GI: +colostomy, 50mL output noted on .   Bowel Regimen? [] Yes   [x] No    Weights:   Daily Weight in k (), Weight in k.5 (), Weight in k.6 (), Weight in k.9 (), Weight in k.3 ()   - Pt previously reported UBW 137lbs or 62.3kg. Pt previously receiving diuretics however most recent wt of 52kg suggests weight loss from baseline.    Unable to perform nutrition focused physical exam at this time given pt undergoing medical care s/p intubation.    MEDICATIONS  (STANDING):  dextrose 50% Injectable  furosemide    Tablet  insulin lispro (ADMELOG) corrective regimen sliding scale  insulin lispro (ADMELOG) corrective regimen sliding scale  insulin lispro Injectable (ADMELOG)  insulin lispro Injectable (ADMELOG)  insulin lispro Injectable (ADMELOG)  insulin NPH human recombinant  insulin NPH human recombinant  meropenem  IVPB  meropenem  IVPB  meropenem  IVPB  metoprolol tartrate  mexiletine  multivitamin  nystatin    Suspension  pantoprazole  Injectable  predniSONE   Tablet  sodium chloride 0.9% lock flush  sodium chloride 0.9%.  vancomycin  IVPB    Pertinent Labs:  @ 07:48: Na 134<L>, BUN 39<H>, Cr 1.08, <H>, K+ 5.3, Phos 3.5, Mg 1.8, Alk Phos 177<H>, ALT/SGPT 27, AST/SGOT 17, HbA1c --    A1C with Estimated Average Glucose Result: 9.4 % (22 @ 16:46)  A1C with Estimated Average Glucose Result: 9.2 % (22 @ 07:36)  A1C with Estimated Average Glucose Result: 8.0 % (05-10-22 @ 12:26)    Finger Sticks:  POCT Blood Glucose.: 350 mg/dL ( @ 05:46)  POCT Blood Glucose.: 241 mg/dL ( @ 02:07)  POCT Blood Glucose.: 132 mg/dL ( @ 22:31)  POCT Blood Glucose.: 167 mg/dL ( @ 19:49)  POCT Blood Glucose.: 244 mg/dL ( @ 16:10)  POCT Blood Glucose.: 339 mg/dL ( @ 11:20)    Skin per nursing documentation: No noted pressure injuries as per documentation, midsternal incision  Edema: +1 bilateral leg     Based on UBW 62.3kg with consideration for intubation  Estimated Energy Needs: 1558 - 1869kcal (25 - 30kcal/kg)   Estimated Protein Needs: 87 - 100g (1.4 - 1.6g/kg)  Estimated Fluid Needs: per team.  [place] Equation: 1461kcal ()    Previous Nutrition Diagnosis: Inadequate Protein Energy Intake, Increased Nutrient Needs  Nutrition Diagnosis is: [x] ongoing  [] resolved [] not applicable     New Nutrition Diagnosis: Moderate acute malnutrition related to inadequate protein energy intake as evidenced by suspect pt meeting <75% EER x 7 days, mild fluid retention, possible 10kg wt loss from baseline/UBW (x 1 month?).  Goal: Pt to meet >80% of estimated nutritional needs during hospital stay.     Nutrition Care Plan:  [x] In Progress  [] Achieved  [] Not applicable    Nutrition Interventions:     Education Provided:       [] Yes:  [x] No: N/A    Recommendations:   1. Defer initiation of enteral feeds to team. When/if EN feasible recommend initiating continuous trickle feeds of Vital AF at 10mL/hr. When/if TF advancement feasible - advance as able to goal rate of 60mL/hr x 24hr to provide 1440mL total volume, 1728kcal, 108g protein, 1168mL free water. Meets ~28kcal/kg, ~1.7g/kg protein based on UBW 62.3kg.   2. Monitor clinical course and GI tolerance to EN, RD remains available to adjust TF regimen/formulary as needed/upon request.     Monitoring and Evaluation:   Continue to monitor nutritional intake, tolerance to diet prescription, weights, labs, skin integrity    RD remains available upon request and will follow up per protocol    Deedee Rosas MS, RD, CDN, [facility] Pager #[phone]

## 2022-09-27 NOTE — PROGRESS NOTE ADULT - NSPROGADDITIONALINFOA_GEN_ALL_CORE
-Plan discussed with pt/team.  Contact info: [phone] (24/7). pager [phone]  [email or web address] password [email or web address]  Spent 29 minutes assessing pt/labs/meds and discussing plan of care with primary team  Adjusting insulin  Discharge plan  Follow up care

## 2022-09-27 NOTE — PROGRESS NOTE ADULT - PROBLEM SELECTOR PLAN 1
-test BG hourly while on insulin gtt. Once off gtt can check q6h if NPO/TF and AC/HS/2AM daily once eating again  -Start Insulin drip to BG goal 100 to 180s  -Discontinue ALL SQ insulin doses while NPO on insulin drip and off TFs   -Contact endo team once pt is more stable to restart SQ insulin again  Discharge plan:  - Likely to discharge patient home on basal/bolus insulin. Final regimen pending clinical course.  - Recommend routine outpatient ophthalmology, podiatry  - Can f/u with endocrinologist Dr. Saavedra.  - Make sure pt has Rx for all DM supplies and insulin/ DM meds.  -Based on pt's clinical condition and mental status at this time she is not able to independently manage her DM. Will evaluate pt's ability to manage DM at time of discharge. If unable> pt will need family/ care giver (s) to manage DM care at home  -Will need rehab. -test BG hourly while on insulin gtt. Once off gtt can check q6h if NPO/TF and AC/HS/2AM daily once eating again  -Start Insulin drip to BG goal 100 to 180s  -Discontinue ALL SQ insulin doses while NPO on insulin drip and off TFs   -Discontinue TFs/PO diet order.  -Contact endo team once pt is more stable to restart SQ insulin again  Discharge plan:  - Likely to discharge patient home on basal/bolus insulin. Final regimen pending clinical course.  - Recommend routine outpatient ophthalmology, podiatry  - Can f/u with endocrinologist Dr. Saavedra.  - Make sure pt has Rx for all DM supplies and insulin/ DM meds.  -Based on pt's clinical condition and mental status at this time she is not able to independently manage her DM. Will evaluate pt's ability to manage DM at time of discharge. If unable> pt will need family/ care giver (s) to manage DM care at home  -Will need rehab.

## 2022-09-27 NOTE — PROVIDER CONTACT NOTE (OTHER) - BACKGROUND
Pt s/p aortic root dissection repair. Pt on NGT feeds for malnourishment. Refer to provider notes for further detail.
PATIENT ADMITTED WITH ADMIT WITH R LATERAL FOOT AND R LATERAL ANKLE WOUNDS REQUIRING OUTPATIENT TREATMENT. WOUNDS + MRSA FROM JULY 2022 CULTURE
PATIENT HAS HISTORY OF PRESSURE ULCER.
see H&P

## 2022-09-27 NOTE — PROGRESS NOTE ADULT - ASSESSMENT
71 year-old female with a history of DM2, CAD, A-Fib on apixaban, Severe Persistent Asthma (on chronic steroids, recently started on Tezspire), colon cancer s/p resection/chemo, tracheobronchomalacia s/p tracheoplasty, and recent OM of the R foot s/b debridement and completed course of Vancomycin/Ertapenem for MRSA/ESBL Proteus/Corynebacterium who now presents with chest pain, found to have Type A dissection s/p repair with modified Bentall procedure and hemiarch replacement on 9/6/22. Post-op course complicated by acute hypoxemic respiratory failure, shock, anemia, and hyperglycemia requiring insulin gtt. Extubated 9/13 with initial clinical improvement.    On 9/27 AM patient had vomiting and aspiration with respiratory distress requiring emergent intubation. She is now transferred back to PICU/CTICU    Assessment:  acute hypoxemic respiratory failure  Type A Aortic Dissection  Severe Persistent Asthma  History of Tracheobronchomalacia    Plan:  #Acute Hypoxemic Respiratory Failure - now in setting of suspected aspiration  - Intubated by anesthesia this AM  - ETT in place   - Adjust vent settings based on ABG and as per CTS  - Maintain O2 sat > 90%    #Suspected aspiration pneumonia  - Check sputum culture  - Restarted on antibiotics - followup with ID    #Severe Persistent Asthma - chronic and steroid dependent  - Continue prednisone at 8 mg daily (chronic dose for her severe persistent asthma) - can be increased to stress dose steroids if needed for shock state  - Albuterol and ipratropium nebs q6h  - Budesonide 0.5 mg nebs q12h     - Chest PT and airway clearance   - S/p Tezspire injection on 8/29 - previously was on Dupixent  - Montelukast 10 mg at bedtime    #AAA s/p repair  - Management as per CTS    #Tracheobronchomalacia s/p tracheoplasty in the past with Dr. Zapien at [facility]  - continue bronchodilator therapy  - airway clearance    #Cardiovascular  - Afib and RIJ Thrombus - continue AC  - Mexilitene - prior PVCs  - Was previously on Metoprolol - but currently on Phenylephrine and Norepinephrine for shock state post intubation  - Consider repeat echo for further evaluation of shock state  - Monitor volume status    #Neuro  - lighten sedation as able to assess mental status

## 2022-09-27 NOTE — PROVIDER CONTACT NOTE (OTHER) - ASSESSMENT
Pt had 1 bout of emesis. Pt began to shiver, tachycardic, HTN, and difficulty breathing. Pt placed on 5L NC. Tube feeds held. .

## 2022-09-27 NOTE — PROGRESS NOTE ADULT - SUBJECTIVE AND OBJECTIVE BOX
Patient seen and examined at the bedside.    Remained critically ill on continuous ICU monitoring.    OBJECTIVE:  Vital Signs Last 24 Hrs  T(C): 35.8 (27 Sep 2022 16:00), Max: 37.7 (27 Sep 2022 08:00)  T(F): 96.5 (27 Sep 2022 16:00), Max: 99.9 (27 Sep 2022 08:00)  HR: 86 (27 Sep 2022 19:00) (81 - 132)  BP: 82/52 (27 Sep 2022 18:45) (80/19 - 153/98)  BP(mean): 62 (27 Sep 2022 18:45) (26 - 97)  RR: 30 (27 Sep 2022 19:00) (16 - 38)  SpO2: 98% (27 Sep 2022 18:05) (94% - 100%)    Parameters below as of 27 Sep 2022 17:30  Patient On (Oxygen Delivery Method): ventilator    Physical Exam:   General:  elderly female, OOBTC, NAD  Neurology: intubated  ENT/Neck: Neck supple, trachea midline, No JVD  Respiratory: rhonchi throughout    CV: S1S2, no murmurs        [x] Sinus Rhythm   Abdominal: Soft, NT, ND, colostomy in place  Extremities: trace pedal edema noted, + peripheral pulses   Skin: Dry dressing over right heel, no Rashes, Hematoma, Ecchymosis    Assessment:  71F PMH DM, COPD, Chronic Adrenal Insufficiency on Chronic prednisone, history of colorectal cancer s/p resection (colostomy bag), Hx of CAD, Chronic A fib on Eliquis, and tracheomalacia and multiple intubations, recent dx of OM presented to ED at [facility] this morning with epigastric pain, belching and central chest pain. Found on CTA to have type A aortic dissection and transferred to [facility] for surgical evaluation by Dr. Cabrera.     Ascending aortic dissection s/p modified bentall and hemiarch on 9/6   Hypovolemic shock   Post op respiratory insufficiency  Acute blood loss anemia  Stress hyperglycemia   Lactic Acidosis  RIJ thrombus  Pancreatic cyst    Proteus PNA  Leukocytosis    Plan:   ***Neuro***  [x] Nonfocal   Post operative neuro assessment   Continue Seroquel    ***Cardiovascular***  9/27: Patient returned to ICU for presumed sepsis, presenting with tachypnea, fevers, tachycardia  CT Chest on 9/12:  Status post recent ascending aortic dissection repair. Small amount of fluid surrounding the ascending aorta without evidence of enhancing wall to suggest abscess.  RUE duplex on 9/12: There is a thrombosed and occluded RIJ     Invasive hemodynamic monitoring, assess perfusion indices   SR /MAP 56/Hct 25.0%/ Lactate 3.2  IV vasopressors (phenylephrine, levophed, vasopressin) started to maintain MAP > 65  Continue Mexiletine  for rate control   [x] AC therapy with Eliquis held at this time     ***Pulmonary***  CT Chest on 9/12: Bilateral lower lobe atelectasis with bilateral pleural effusions   Reintubated for change in mental status on 9/8, self extubated on 9/9 and reintubated again, extubated to HFO2 on 9/13 9/27 Intubated for airway protection (high peak/plateau pressures on volume control. Switched to Pressure control.)  Hx of COPD / Continue bronchodilators and Prednisone   Monitor secretions and suction PRN     Mode: AC/ CMV (Assist Control/ Continuous Mandatory Ventilation)  RR (machine): 16  TV (machine): 400  FiO2: 100  PEEP: 8  ITime: 0.9  MAP: 22  PC: 35  PIP: 43              ***GI***  CT A/P on 9/12: Mild thickening of the cecum and ascending colon possibly representing mild nonspecific proximal colitis. Hepatic cirrhosis. The focal IPMN of the pancreas with large cyst within the tail the pancreas measuring 3.1 cm.  Colostomy bag in place, continue to monitor output   [x] Protonix   Passed FEES on 9/20, CC diet with reg liquids, pt with decreased appetite need to encourage PO intake  9/27: NPO at this time. c/f possible aspiration after multiple episodes of vomiting 9/26  Simethicone PRN gas    ***Renal***  Continue to monitor I/Os, BUN/Creatinine.   Replete lytes PRN  Amezcua placed   Diurese with Lasix     ***ID***  SCx on 9/8 +Proteus mirabilis   UA on 9/9+ LE, WBC 60, few yeast  /  UCx on 9/10 NG   BCx on 9/10 NGTD, BCx on 9/12 NGTD 9/16 bcx NGTD  Nystatin for thrush   Diflucan added on 9/19 for Yeast found in Urine on 9/16  Broad spectrum antibiotics (meropenem and vancomycin started for presumed sepsis) 9/27    ***Endocrine***  [x]  DM : HbA1c 9.4%                - [x] Insulin gtt              - Need tight glycemic control to prevent wound infection.  Endo following for recs now that patient has some PO intake with tube feed supplementation         Patient requires continuous monitoring with bedside rhythm monitoring, pulse oximetry monitoring, and continuous central venous and arterial pressure monitoring; and intermittent blood gas analysis. Care plan discussed with the ICU care team.   Patient remained critical, at risk for life threatening decompensation.    I have spent 30 minutes providing critical care management to this patient.    By signing my name below, I, Bharti Barrios, attest that this documentation has been prepared under the direction and in the presence of KIANA Camarillo.  Electronically signed: Georgi Gottlieb, 09-27-22 @ 19:20    I, Mary Huitron, personally performed the services described in this documentation. all medical record entries made by the scribe were at my direction and in my presence. I have reviewed the chart and agree that the record reflects my personal performance and is accurate and complete  Electronically signed: KIANA Camarillo. Patient seen and examined at the bedside.    Remained critically ill on continuous ICU monitoring.    OBJECTIVE:  Vital Signs Last 24 Hrs  T(C): 35.8 (27 Sep 2022 16:00), Max: 37.7 (27 Sep 2022 08:00)  T(F): 96.5 (27 Sep 2022 16:00), Max: 99.9 (27 Sep 2022 08:00)  HR: 86 (27 Sep 2022 19:00) (81 - 132)  BP: 82/52 (27 Sep 2022 18:45) (80/19 - 153/98)  BP(mean): 62 (27 Sep 2022 18:45) (26 - 97)  RR: 30 (27 Sep 2022 19:00) (16 - 38)  SpO2: 98% (27 Sep 2022 18:05) (94% - 100%)    Parameters below as of 27 Sep 2022 17:30  Patient On (Oxygen Delivery Method): ventilator    Physical Exam:   General:  elderly female, OOBTC, NAD  Neurology: intubated  ENT/Neck: Neck supple, trachea midline, No JVD  Respiratory: rhonchi throughout    CV: S1S2, no murmurs        [x] Sinus Rhythm   Abdominal: Soft, NT, ND, colostomy in place  Extremities: trace pedal edema noted, + peripheral pulses   Skin: Dry dressing over right heel, no Rashes, Hematoma, Ecchymosis    Assessment:  71F PMH DM, COPD, Chronic Adrenal Insufficiency on Chronic prednisone, history of colorectal cancer s/p resection (colostomy bag), Hx of CAD, Chronic A fib on Eliquis, and tracheomalacia and multiple intubations, recent dx of OM presented to ED at [facility] this morning with epigastric pain, belching and central chest pain. Found on CTA to have type A aortic dissection and transferred to [facility] for surgical evaluation by Dr. Cabrera.     Ascending aortic dissection s/p modified bentall and hemiarch on    Hypovolemic shock   Post op respiratory insufficiency  Acute blood loss anemia  Stress hyperglycemia   Lactic Acidosis  RIJ thrombus  Pancreatic cyst    Proteus PNA  Leukocytosis    Plan:   ***Neuro***  [x] does not move to painful stimuli.. opens eyes to voice  -CT scan today?   Post operative neuro assessment       ***Cardiovascular***  : Patient returned to ICU for presumed sepsis, presenting with tachypnea, fevers, tachycardia  CT Chest on :  Status post recent ascending aortic dissection repair. Small amount of fluid surrounding the ascending aorta without evidence of enhancing wall to suggest abscess.  RUE duplex on : There is a thrombosed and occluded RIJ   Invasive hemodynamic monitoring, assess perfusion indices   --> decompensated overnight, rise in lactate, RV enlargement w/ low UOP, vasogenic shock s/p methylene blue and  started  IV vasopressors (phenylephrine, levophed, vasopressin) started to maintain MAP > 65  Continue Mexiletine  for rate control   [x] AC therapy with Eliquis held at this time     ***Pulmonary***  CT Chest on : Bilateral lower lobe atelectasis with bilateral pleural effusions   Reintubated for change in mental status on , self extubated on  and reintubated again, extubated to HFO2 on  Intubated for airway protection (high peak/plateau pressures on volume control. Switched to Pressure control.) ARDS ?   Mar started overnight for RV failure, weaning fi02 as tolerated   Hx of COPD / Continue bronchodilators   Monitor secretions and suction PRN     PC: 35/5/20/80%     ***GI***  CT A/P on : Mild thickening of the cecum and ascending colon possibly representing mild nonspecific proximal colitis. Hepatic cirrhosis. The focal IPMN of the pancreas with large cyst within the tail the pancreas measuring 3.1 cm.  Colostomy bag in place, continue to monitor output   [x] Protonix   Passed FEES on , CC diet with reg liquids, pt with decreased appetite need to encourage PO intake  : NPO at this time-->possible aspiration after multiple episodes of vomiting   Monitor colotomy bag- gas but no stool   Simethicone PRN gas    ***Renal***  Continue to monitor I/Os, BUN/Creatinine.   Replete lytes PRN  Amezcua placed   Diurese with Lasix PRN  Hyperkalemic overnight corrected with Lokelmia dextrose Insulin mulitple pushes of bicarb --> renal called overnight to eval for possible urgent HD     ***ID***  SCx on  +Proteus mirabilis   UA on + LE, WBC 60, few yeast  /  UCx on 9/10 NG   BCx on 9/10 NGTD, BCx on  NGTD  bcx NGTD  Nystatin for thrush   Diflucan added on  for Yeast found in Urine on   Broad spectrum antibiotics (meropenem and vancomycin started for presumed sepsis)     ***Endocrine***  [x]  DM : HbA1c 9.4%                - [x] Insulin gtt              - Need tight glycemic control to prevent wound infection.  Endo following for recs now that patient has some PO intake with tube feed supplementation         Patient requires continuous monitoring with bedside rhythm monitoring, pulse oximetry monitoring, and continuous central venous and arterial pressure monitoring; and intermittent blood gas analysis. Care plan discussed with the ICU care team.   Patient remained critical, at risk for life threatening decompensation.    I have spent 30 minutes providing critical care management to this patient.    By signing my name below, I, Bharti Barrios, attest that this documentation has been prepared under the direction and in the presence of KIANA Camarillo.  Electronically signed: Georgi Gottlieb, 22 @ 19:20    I, Mary Huitron, personally performed the services described in this documentation. all medical record entries made by the scribe were at my direction and in my presence. I have reviewed the chart and agree that the record reflects my personal performance and is accurate and complete  Electronically signed: KIANA Camarillo.

## 2022-09-27 NOTE — PROGRESS NOTE ADULT - SUBJECTIVE AND OBJECTIVE BOX
DIABETES FOLLOW UP NOTE: Saw pt earlier today    Chief Complaint: Endocrine consult requested for management of T2DM    INTERVAL HX: Noted earlier events, saw pt in CTU. Team intubating pt at time of visit. TFs on hold, pt had emesis with hyperglycemia this am> possible aspiration< wbc over 20 thousand. NPO and per ICU team starting insulin drip. On pressors      Review of Systems:  General: As above  Unable      Allergies    aspirin (Short breath)  Avelox (Short breath; Pruritus)  cefepime (Anaphylaxis)  codeine (Short breath)  Dilaudid (Short breath)  iodine (Short breath; Swelling)  penicillin (Anaphylaxis)  shellfish (Anaphylaxis)  tetanus toxoid (Short breath)  Valium (Short breath)    Intolerances      MEDICATIONS:  insulin lispro (ADMELOG) corrective regimen sliding scale   SubCutaneous three times a day before meals  insulin lispro (ADMELOG) corrective regimen sliding scale   SubCutaneous <User Schedule>  insulin lispro Injectable (ADMELOG) 12 Unit(s) SubCutaneous before breakfast  insulin lispro Injectable (ADMELOG) 10 Unit(s) SubCutaneous before lunch  insulin lispro Injectable (ADMELOG) 8 Unit(s) SubCutaneous before dinner  insulin NPH human recombinant 8 Unit(s) SubCutaneous <User Schedule>  insulin NPH human recombinant 25 Unit(s) SubCutaneous <User Schedule>  insulin regular Infusion 3 Unit(s)/Hr (3 mL/Hr) IV Continuous <Continuous>  meropenem  IVPB 1000 milliGRAM(s) IV Intermittent every 12 hours  norepinephrine Infusion 0.05 MICROgram(s)/kG/Min (6.35 mL/Hr) IV Continuous <Continuous>  phenylephrine    Infusion 1.5 MICROgram(s)/kG/Min (38.1 mL/Hr) IV Continuous <Continuous>  predniSONE   Tablet 8 milliGRAM(s) Oral daily  vancomycin  IVPB 1000 milliGRAM(s) IV Intermittent every 24 hours  vasopressin Infusion 0.04 Unit(s)/Min (6 mL/Hr) IV Continuous <Continuous>      PHYSICAL EXAM:  VITALS: T(C): 37.7 (09-27-22 @ 12:00)  T(F): 99.9 (09-27-22 @ 12:00), Max: 100.2 (09-26-22 @ 19:11)  HR: 90 (09-27-22 @ 14:00) (88 - 132)  BP: 96/54 (09-27-22 @ 14:00) (88/63 - 153/98)  RR:  (16 - 24)  SpO2:  (94% - 100%)  Wt(kg): --  GENERAL: Female in ICU been intubated at time of visit.   Unable to perform PE at this time    LABS:  POCT Blood Glucose.: 173 mg/dL (09-27-22 @ 13:09)  POCT Blood Glucose.: 271 mg/dL (09-27-22 @ 11:48)  POCT Blood Glucose.: 350 mg/dL (09-27-22 @ 05:46)  POCT Blood Glucose.: 241 mg/dL (09-27-22 @ 02:07)  POCT Blood Glucose.: 132 mg/dL (09-26-22 @ 22:31)  POCT Blood Glucose.: 167 mg/dL (09-26-22 @ 19:49)  POCT Blood Glucose.: 244 mg/dL (09-26-22 @ 16:10)  POCT Blood Glucose.: 339 mg/dL (09-26-22 @ 11:20)  POCT Blood Glucose.: 131 mg/dL (09-26-22 @ 07:27)  POCT Blood Glucose.: 166 mg/dL (09-26-22 @ 06:09)  POCT Blood Glucose.: 213 mg/dL (09-26-22 @ 05:19)  POCT Blood Glucose.: 216 mg/dL (09-26-22 @ 04:01)  POCT Blood Glucose.: 245 mg/dL (09-26-22 @ 03:06)  POCT Blood Glucose.: 336 mg/dL (09-26-22 @ 02:01)  POCT Blood Glucose.: 179 mg/dL (09-25-22 @ 20:52)  POCT Blood Glucose.: 110 mg/dL (09-25-22 @ 16:33)  POCT Blood Glucose.: 104 mg/dL (09-25-22 @ 15:36)  POCT Blood Glucose.: 122 mg/dL (09-25-22 @ 14:33)  POCT Blood Glucose.: 144 mg/dL (09-25-22 @ 13:31)  POCT Blood Glucose.: 314 mg/dL (09-25-22 @ 12:34)  POCT Blood Glucose.: 337 mg/dL (09-25-22 @ 11:36)  POCT Blood Glucose.: 321 mg/dL (09-25-22 @ 10:26)  POCT Blood Glucose.: 296 mg/dL (09-25-22 @ 08:32)  POCT Blood Glucose.: 317 mg/dL (09-25-22 @ 07:23)  POCT Blood Glucose.: 331 mg/dL (09-25-22 @ 05:04)  POCT Blood Glucose.: 342 mg/dL (09-25-22 @ 04:17)  POCT Blood Glucose.: 358 mg/dL (09-25-22 @ 01:49)  POCT Blood Glucose.: 223 mg/dL (09-24-22 @ 22:19)  POCT Blood Glucose.: 236 mg/dL (09-24-22 @ 20:43)  POCT Blood Glucose.: 317 mg/dL (09-24-22 @ 16:35)                            11.0   23.31 )-----------( 403      ( 27 Sep 2022 07:48 )             35.8       09-27    134<L>  |  97  |  39<H>  ----------------------------<  388<H>  5.3   |  20<L>  |  1.08    eGFR: 54<L>    Ca    9.6      09-27  Mg     1.8     09-27  Phos  3.5     09-27    TPro  7.9  /  Alb  3.3  /  TBili  0.4  /  DBili  x   /  AST  17  /  ALT  27  /  AlkPhos  177<H>  09-27    Thyroid Function Tests:  09-06 @ 16:46 TSH 3.30 FreeT4 -- T3 -- Anti TPO -- Anti Thyroglobulin Ab -- TSI --      A1C with Estimated Average Glucose Result: 9.4 % (09-06-22 @ 16:46)  A1C with Estimated Average Glucose Result: 9.2 % (07-11-22 @ 07:36)      Estimated Average Glucose: 223 mg/dL (09-06-22 @ 16:46)  Estimated Average Glucose: 217 mg/dL (07-11-22 @ 07:36)

## 2022-09-27 NOTE — AIRWAY PLACEMENT NOTE ADULT - AIRWAY COMMENTS:
pt got reintubated by MD Martin in CTU
Patient receive  from OR intubated 7.5 ETT at 21 lipline is on mechanical ventilation. No apparent respiratory distress noted
Assisted Anesthesia MD with intubation. ETT = 8.0, 23 @ lip.

## 2022-09-28 LAB
ALBUMIN SERPL ELPH-MCNC: 3.9 G/DL — SIGNIFICANT CHANGE UP (ref 3.3–5)
ALBUMIN SERPL ELPH-MCNC: 4.1 G/DL — SIGNIFICANT CHANGE UP (ref 3.3–5)
ALBUMIN SERPL ELPH-MCNC: 4.2 G/DL — SIGNIFICANT CHANGE UP (ref 3.3–5)
ALP SERPL-CCNC: 106 U/L — SIGNIFICANT CHANGE UP (ref 40–120)
ALP SERPL-CCNC: 112 U/L — SIGNIFICANT CHANGE UP (ref 40–120)
ALP SERPL-CCNC: 117 U/L — SIGNIFICANT CHANGE UP (ref 40–120)
ALT FLD-CCNC: 2666 U/L — HIGH (ref 10–45)
ALT FLD-CCNC: 3612 U/L — HIGH (ref 10–45)
ALT FLD-CCNC: 3625 U/L — HIGH (ref 10–45)
ANION GAP SERPL CALC-SCNC: 17 MMOL/L — SIGNIFICANT CHANGE UP (ref 5–17)
ANION GAP SERPL CALC-SCNC: 23 MMOL/L — HIGH (ref 5–17)
ANION GAP SERPL CALC-SCNC: 23 MMOL/L — HIGH (ref 5–17)
APTT BLD: 33.1 SEC — SIGNIFICANT CHANGE UP (ref 27.5–35.5)
AST SERPL-CCNC: 2742 U/L — HIGH (ref 10–40)
AST SERPL-CCNC: 7768 U/L — HIGH (ref 10–40)
AST SERPL-CCNC: 7849 U/L — HIGH (ref 10–40)
BASE EXCESS BLDV CALC-SCNC: -1.2 MMOL/L — SIGNIFICANT CHANGE UP (ref -2–3)
BASE EXCESS BLDV CALC-SCNC: -2.2 MMOL/L — LOW (ref -2–3)
BILIRUB SERPL-MCNC: 2 MG/DL — HIGH (ref 0.2–1.2)
BILIRUB SERPL-MCNC: 2 MG/DL — HIGH (ref 0.2–1.2)
BILIRUB SERPL-MCNC: 2.1 MG/DL — HIGH (ref 0.2–1.2)
BUN SERPL-MCNC: 41 MG/DL — HIGH (ref 7–23)
BUN SERPL-MCNC: 41 MG/DL — HIGH (ref 7–23)
BUN SERPL-MCNC: 43 MG/DL — HIGH (ref 7–23)
CA-I SERPL-SCNC: 1.23 MMOL/L — SIGNIFICANT CHANGE UP (ref 1.15–1.33)
CALCIUM SERPL-MCNC: 10.1 MG/DL — SIGNIFICANT CHANGE UP (ref 8.4–10.5)
CALCIUM SERPL-MCNC: 9.4 MG/DL — SIGNIFICANT CHANGE UP (ref 8.4–10.5)
CALCIUM SERPL-MCNC: 9.9 MG/DL — SIGNIFICANT CHANGE UP (ref 8.4–10.5)
CHLORIDE BLDV-SCNC: 101 MMOL/L — SIGNIFICANT CHANGE UP (ref 96–108)
CHLORIDE SERPL-SCNC: 100 MMOL/L — SIGNIFICANT CHANGE UP (ref 96–108)
CHLORIDE SERPL-SCNC: 101 MMOL/L — SIGNIFICANT CHANGE UP (ref 96–108)
CHLORIDE SERPL-SCNC: 101 MMOL/L — SIGNIFICANT CHANGE UP (ref 96–108)
CO2 BLDV-SCNC: 25 MMOL/L — SIGNIFICANT CHANGE UP (ref 22–26)
CO2 BLDV-SCNC: 26 MMOL/L — SIGNIFICANT CHANGE UP (ref 22–26)
CO2 SERPL-SCNC: 20 MMOL/L — LOW (ref 22–31)
CO2 SERPL-SCNC: 20 MMOL/L — LOW (ref 22–31)
CO2 SERPL-SCNC: 25 MMOL/L — SIGNIFICANT CHANGE UP (ref 22–31)
CREAT SERPL-MCNC: 1.47 MG/DL — HIGH (ref 0.5–1.3)
CREAT SERPL-MCNC: 1.51 MG/DL — HIGH (ref 0.5–1.3)
CREAT SERPL-MCNC: 1.53 MG/DL — HIGH (ref 0.5–1.3)
CULTURE RESULTS: SIGNIFICANT CHANGE UP
EGFR: 35 ML/MIN/1.73M2 — LOW
EGFR: 36 ML/MIN/1.73M2 — LOW
EGFR: 37 ML/MIN/1.73M2 — LOW
FIBRINOGEN PPP-MCNC: 472 MG/DL — SIGNIFICANT CHANGE UP (ref 330–520)
GAS PNL BLDA: SIGNIFICANT CHANGE UP
GAS PNL BLDV: 139 MMOL/L — SIGNIFICANT CHANGE UP (ref 136–145)
GAS PNL BLDV: SIGNIFICANT CHANGE UP
GLUCOSE BLDC GLUCOMTR-MCNC: 100 MG/DL — HIGH (ref 70–99)
GLUCOSE BLDC GLUCOMTR-MCNC: 118 MG/DL — HIGH (ref 70–99)
GLUCOSE BLDC GLUCOMTR-MCNC: 125 MG/DL — HIGH (ref 70–99)
GLUCOSE BLDC GLUCOMTR-MCNC: 153 MG/DL — HIGH (ref 70–99)
GLUCOSE BLDC GLUCOMTR-MCNC: 156 MG/DL — HIGH (ref 70–99)
GLUCOSE BLDC GLUCOMTR-MCNC: 157 MG/DL — HIGH (ref 70–99)
GLUCOSE BLDC GLUCOMTR-MCNC: 171 MG/DL — HIGH (ref 70–99)
GLUCOSE BLDC GLUCOMTR-MCNC: 171 MG/DL — HIGH (ref 70–99)
GLUCOSE BLDC GLUCOMTR-MCNC: 175 MG/DL — HIGH (ref 70–99)
GLUCOSE BLDC GLUCOMTR-MCNC: 177 MG/DL — HIGH (ref 70–99)
GLUCOSE BLDC GLUCOMTR-MCNC: 178 MG/DL — HIGH (ref 70–99)
GLUCOSE BLDC GLUCOMTR-MCNC: 181 MG/DL — HIGH (ref 70–99)
GLUCOSE BLDC GLUCOMTR-MCNC: 189 MG/DL — HIGH (ref 70–99)
GLUCOSE BLDC GLUCOMTR-MCNC: 191 MG/DL — HIGH (ref 70–99)
GLUCOSE BLDC GLUCOMTR-MCNC: 191 MG/DL — HIGH (ref 70–99)
GLUCOSE BLDC GLUCOMTR-MCNC: 200 MG/DL — HIGH (ref 70–99)
GLUCOSE BLDC GLUCOMTR-MCNC: 204 MG/DL — HIGH (ref 70–99)
GLUCOSE BLDC GLUCOMTR-MCNC: 233 MG/DL — HIGH (ref 70–99)
GLUCOSE BLDC GLUCOMTR-MCNC: 253 MG/DL — HIGH (ref 70–99)
GLUCOSE BLDV-MCNC: 250 MG/DL — HIGH (ref 70–99)
GLUCOSE SERPL-MCNC: 195 MG/DL — HIGH (ref 70–99)
GLUCOSE SERPL-MCNC: 200 MG/DL — HIGH (ref 70–99)
GLUCOSE SERPL-MCNC: 246 MG/DL — HIGH (ref 70–99)
GRAM STN FLD: SIGNIFICANT CHANGE UP
HCO3 BLDV-SCNC: 24 MMOL/L — SIGNIFICANT CHANGE UP (ref 22–29)
HCO3 BLDV-SCNC: 24 MMOL/L — SIGNIFICANT CHANGE UP (ref 22–29)
HCT VFR BLD CALC: 25.6 % — LOW (ref 34.5–45)
HCT VFR BLD CALC: 29.1 % — LOW (ref 34.5–45)
HCT VFR BLD CALC: 29.6 % — LOW (ref 34.5–45)
HCT VFR BLDA CALC: 30 % — LOW (ref 34.5–46.5)
HGB BLD CALC-MCNC: 9.9 G/DL — LOW (ref 11.7–16.1)
HGB BLD-MCNC: 8.2 G/DL — LOW (ref 11.5–15.5)
HGB BLD-MCNC: 8.9 G/DL — LOW (ref 11.5–15.5)
HGB BLD-MCNC: 9 G/DL — LOW (ref 11.5–15.5)
INR BLD: 3.71 RATIO — HIGH (ref 0.88–1.16)
INR BLD: 4 RATIO — HIGH (ref 0.88–1.16)
LACTATE BLDV-MCNC: 8.1 MMOL/L — CRITICAL HIGH (ref 0.5–2)
LACTATE SERPL-SCNC: 7.6 MMOL/L — CRITICAL HIGH (ref 0.5–2)
LACTATE SERPL-SCNC: 8.7 MMOL/L — CRITICAL HIGH (ref 0.5–2)
MAGNESIUM SERPL-MCNC: 2.6 MG/DL — SIGNIFICANT CHANGE UP (ref 1.6–2.6)
MAGNESIUM SERPL-MCNC: 2.7 MG/DL — HIGH (ref 1.6–2.6)
MCHC RBC-ENTMCNC: 24.1 PG — LOW (ref 27–34)
MCHC RBC-ENTMCNC: 24.5 PG — LOW (ref 27–34)
MCHC RBC-ENTMCNC: 24.9 PG — LOW (ref 27–34)
MCHC RBC-ENTMCNC: 30.1 GM/DL — LOW (ref 32–36)
MCHC RBC-ENTMCNC: 30.9 GM/DL — LOW (ref 32–36)
MCHC RBC-ENTMCNC: 32 GM/DL — SIGNIFICANT CHANGE UP (ref 32–36)
MCV RBC AUTO: 77.8 FL — LOW (ref 80–100)
MCV RBC AUTO: 79.3 FL — LOW (ref 80–100)
MCV RBC AUTO: 80.2 FL — SIGNIFICANT CHANGE UP (ref 80–100)
NRBC # BLD: 2 /100 WBCS — HIGH (ref 0–0)
NRBC # BLD: 5 /100 WBCS — HIGH (ref 0–0)
NRBC # BLD: 7 /100 WBCS — HIGH (ref 0–0)
PCO2 BLDV: 43 MMHG — HIGH (ref 39–42)
PCO2 BLDV: 44 MMHG — HIGH (ref 39–42)
PH BLDV: 7.34 — SIGNIFICANT CHANGE UP (ref 7.32–7.43)
PH BLDV: 7.36 — SIGNIFICANT CHANGE UP (ref 7.32–7.43)
PHOSPHATE SERPL-MCNC: 5.5 MG/DL — HIGH (ref 2.5–4.5)
PHOSPHATE SERPL-MCNC: 5.6 MG/DL — HIGH (ref 2.5–4.5)
PLATELET # BLD AUTO: 162 K/UL — SIGNIFICANT CHANGE UP (ref 150–400)
PLATELET # BLD AUTO: 184 K/UL — SIGNIFICANT CHANGE UP (ref 150–400)
PLATELET # BLD AUTO: 187 K/UL — SIGNIFICANT CHANGE UP (ref 150–400)
PO2 BLDV: 44 MMHG — SIGNIFICANT CHANGE UP (ref 25–45)
PO2 BLDV: 46 MMHG — HIGH (ref 25–45)
POTASSIUM BLDV-SCNC: 4.7 MMOL/L — SIGNIFICANT CHANGE UP (ref 3.5–5.1)
POTASSIUM SERPL-MCNC: 3.7 MMOL/L — SIGNIFICANT CHANGE UP (ref 3.5–5.3)
POTASSIUM SERPL-MCNC: 4 MMOL/L — SIGNIFICANT CHANGE UP (ref 3.5–5.3)
POTASSIUM SERPL-MCNC: 4.5 MMOL/L — SIGNIFICANT CHANGE UP (ref 3.5–5.3)
POTASSIUM SERPL-SCNC: 3.7 MMOL/L — SIGNIFICANT CHANGE UP (ref 3.5–5.3)
POTASSIUM SERPL-SCNC: 4 MMOL/L — SIGNIFICANT CHANGE UP (ref 3.5–5.3)
POTASSIUM SERPL-SCNC: 4.5 MMOL/L — SIGNIFICANT CHANGE UP (ref 3.5–5.3)
PROT SERPL-MCNC: 6.3 G/DL — SIGNIFICANT CHANGE UP (ref 6–8.3)
PROT SERPL-MCNC: 6.7 G/DL — SIGNIFICANT CHANGE UP (ref 6–8.3)
PROT SERPL-MCNC: 6.7 G/DL — SIGNIFICANT CHANGE UP (ref 6–8.3)
PROTHROM AB SERPL-ACNC: 43.6 SEC — HIGH (ref 10.5–13.4)
PROTHROM AB SERPL-ACNC: 46.7 SEC — HIGH (ref 10.5–13.4)
RBC # BLD: 3.29 M/UL — LOW (ref 3.8–5.2)
RBC # BLD: 3.67 M/UL — LOW (ref 3.8–5.2)
RBC # BLD: 3.69 M/UL — LOW (ref 3.8–5.2)
RBC # FLD: 24.8 % — HIGH (ref 10.3–14.5)
RBC # FLD: 25.4 % — HIGH (ref 10.3–14.5)
RBC # FLD: 25.8 % — HIGH (ref 10.3–14.5)
SAO2 % BLDV: 67.6 % — SIGNIFICANT CHANGE UP (ref 67–88)
SAO2 % BLDV: 68.8 % — SIGNIFICANT CHANGE UP (ref 67–88)
SODIUM SERPL-SCNC: 143 MMOL/L — SIGNIFICANT CHANGE UP (ref 135–145)
SODIUM SERPL-SCNC: 143 MMOL/L — SIGNIFICANT CHANGE UP (ref 135–145)
SODIUM SERPL-SCNC: 144 MMOL/L — SIGNIFICANT CHANGE UP (ref 135–145)
SPECIMEN SOURCE: SIGNIFICANT CHANGE UP
SPECIMEN SOURCE: SIGNIFICANT CHANGE UP
WBC # BLD: 14.95 K/UL — HIGH (ref 3.8–10.5)
WBC # BLD: 7.28 K/UL — SIGNIFICANT CHANGE UP (ref 3.8–10.5)
WBC # BLD: 9.03 K/UL — SIGNIFICANT CHANGE UP (ref 3.8–10.5)
WBC # FLD AUTO: 14.95 K/UL — HIGH (ref 3.8–10.5)
WBC # FLD AUTO: 7.28 K/UL — SIGNIFICANT CHANGE UP (ref 3.8–10.5)
WBC # FLD AUTO: 9.03 K/UL — SIGNIFICANT CHANGE UP (ref 3.8–10.5)

## 2022-09-28 PROCEDURE — 93306 TTE W/DOPPLER COMPLETE: CPT | Mod: 26

## 2022-09-28 PROCEDURE — 99292 CRITICAL CARE ADDL 30 MIN: CPT

## 2022-09-28 PROCEDURE — 74174 CTA ABD&PLVS W/CONTRAST: CPT | Mod: 26

## 2022-09-28 PROCEDURE — 71275 CT ANGIOGRAPHY CHEST: CPT | Mod: 26

## 2022-09-28 PROCEDURE — 99232 SBSQ HOSP IP/OBS MODERATE 35: CPT

## 2022-09-28 PROCEDURE — 99291 CRITICAL CARE FIRST HOUR: CPT

## 2022-09-28 PROCEDURE — 74018 RADEX ABDOMEN 1 VIEW: CPT | Mod: 26

## 2022-09-28 PROCEDURE — 71045 X-RAY EXAM CHEST 1 VIEW: CPT | Mod: 26

## 2022-09-28 RX ORDER — FENTANYL CITRATE 50 UG/ML
25 INJECTION INTRAVENOUS ONCE
Refills: 0 | Status: DISCONTINUED | OUTPATIENT
Start: 2022-09-28 | End: 2022-09-27

## 2022-09-28 RX ORDER — CALCIUM GLUCONATE 100 MG/ML
2 VIAL (ML) INTRAVENOUS ONCE
Refills: 0 | Status: COMPLETED | OUTPATIENT
Start: 2022-09-28 | End: 2022-09-28

## 2022-09-28 RX ORDER — HYDROMORPHONE HYDROCHLORIDE 2 MG/ML
0.5 INJECTION INTRAMUSCULAR; INTRAVENOUS; SUBCUTANEOUS ONCE
Refills: 0 | Status: DISCONTINUED | OUTPATIENT
Start: 2022-09-28 | End: 2022-09-28

## 2022-09-28 RX ORDER — FENTANYL CITRATE 50 UG/ML
25 INJECTION INTRAVENOUS ONCE
Refills: 0 | Status: DISCONTINUED | OUTPATIENT
Start: 2022-09-28 | End: 2022-09-28

## 2022-09-28 RX ORDER — DEXMEDETOMIDINE HYDROCHLORIDE IN 0.9% SODIUM CHLORIDE 4 UG/ML
0.5 INJECTION INTRAVENOUS
Qty: 200 | Refills: 0 | Status: DISCONTINUED | OUTPATIENT
Start: 2022-09-28 | End: 2022-09-29

## 2022-09-28 RX ORDER — POTASSIUM CHLORIDE 20 MEQ
10 PACKET (EA) ORAL
Refills: 0 | Status: COMPLETED | OUTPATIENT
Start: 2022-09-28 | End: 2022-09-28

## 2022-09-28 RX ADMIN — INSULIN HUMAN 3 UNIT(S)/HR: 100 INJECTION, SOLUTION SUBCUTANEOUS at 10:30

## 2022-09-28 RX ADMIN — MEXILETINE HYDROCHLORIDE 200 MILLIGRAM(S): 150 CAPSULE ORAL at 06:27

## 2022-09-28 RX ADMIN — MEXILETINE HYDROCHLORIDE 200 MILLIGRAM(S): 150 CAPSULE ORAL at 22:41

## 2022-09-28 RX ADMIN — CHLORHEXIDINE GLUCONATE 1 APPLICATION(S): 213 SOLUTION TOPICAL at 05:48

## 2022-09-28 RX ADMIN — HYDROMORPHONE HYDROCHLORIDE 0.5 MILLIGRAM(S): 2 INJECTION INTRAMUSCULAR; INTRAVENOUS; SUBCUTANEOUS at 12:33

## 2022-09-28 RX ADMIN — Medication 6.35 MICROGRAM(S)/KG/MIN: at 22:45

## 2022-09-28 RX ADMIN — Medication 3 MILLILITER(S): at 00:10

## 2022-09-28 RX ADMIN — Medication 200 GRAM(S): at 19:30

## 2022-09-28 RX ADMIN — Medication 0.5 MILLIGRAM(S): at 18:02

## 2022-09-28 RX ADMIN — Medication 1 TABLET(S): at 11:31

## 2022-09-28 RX ADMIN — Medication 3 MILLILITER(S): at 12:01

## 2022-09-28 RX ADMIN — MONTELUKAST 10 MILLIGRAM(S): 4 TABLET, CHEWABLE ORAL at 22:41

## 2022-09-28 RX ADMIN — Medication 200 GRAM(S): at 11:30

## 2022-09-28 RX ADMIN — INSULIN HUMAN 3 UNIT(S)/HR: 100 INJECTION, SOLUTION SUBCUTANEOUS at 22:46

## 2022-09-28 RX ADMIN — Medication 500000 UNIT(S): at 11:31

## 2022-09-28 RX ADMIN — Medication 3 MILLILITER(S): at 23:02

## 2022-09-28 RX ADMIN — VASOPRESSIN 6 UNIT(S)/MIN: 20 INJECTION INTRAVENOUS at 22:45

## 2022-09-28 RX ADMIN — Medication 500000 UNIT(S): at 18:16

## 2022-09-28 RX ADMIN — Medication 50 MILLIEQUIVALENT(S): at 12:16

## 2022-09-28 RX ADMIN — HYDROMORPHONE HYDROCHLORIDE 0.5 MILLIGRAM(S): 2 INJECTION INTRAMUSCULAR; INTRAVENOUS; SUBCUTANEOUS at 12:48

## 2022-09-28 RX ADMIN — VASOPRESSIN 6 UNIT(S)/MIN: 20 INJECTION INTRAVENOUS at 10:30

## 2022-09-28 RX ADMIN — Medication 3 MILLILITER(S): at 18:01

## 2022-09-28 RX ADMIN — Medication 100 MILLIGRAM(S): at 13:23

## 2022-09-28 RX ADMIN — Medication 500000 UNIT(S): at 05:47

## 2022-09-28 RX ADMIN — Medication 50 MILLIEQUIVALENT(S): at 11:30

## 2022-09-28 RX ADMIN — Medication 15.2 MICROGRAM(S)/KG/MIN: at 22:45

## 2022-09-28 RX ADMIN — Medication 0.5 MILLIGRAM(S): at 05:05

## 2022-09-28 RX ADMIN — MEROPENEM 100 MILLIGRAM(S): 1 INJECTION INTRAVENOUS at 05:47

## 2022-09-28 RX ADMIN — Medication 100 MILLIGRAM(S): at 22:41

## 2022-09-28 RX ADMIN — Medication 250 MILLIGRAM(S): at 13:41

## 2022-09-28 RX ADMIN — Medication 100 MILLIGRAM(S): at 05:48

## 2022-09-28 RX ADMIN — Medication 3 MILLILITER(S): at 05:05

## 2022-09-28 RX ADMIN — MEROPENEM 100 MILLIGRAM(S): 1 INJECTION INTRAVENOUS at 18:17

## 2022-09-28 RX ADMIN — PANTOPRAZOLE SODIUM 40 MILLIGRAM(S): 20 TABLET, DELAYED RELEASE ORAL at 11:31

## 2022-09-28 RX ADMIN — Medication 50 MILLIEQUIVALENT(S): at 12:58

## 2022-09-28 RX ADMIN — MEXILETINE HYDROCHLORIDE 200 MILLIGRAM(S): 150 CAPSULE ORAL at 13:23

## 2022-09-28 RX ADMIN — Medication 1 APPLICATION(S): at 11:32

## 2022-09-28 RX ADMIN — Medication 6.35 MICROGRAM(S)/KG/MIN: at 10:30

## 2022-09-28 RX ADMIN — DEXMEDETOMIDINE HYDROCHLORIDE IN 0.9% SODIUM CHLORIDE 8.46 MICROGRAM(S)/KG/HR: 4 INJECTION INTRAVENOUS at 22:45

## 2022-09-28 NOTE — PROGRESS NOTE ADULT - ASSESSMENT
71F w/h/o uncontrolled T2DM (A1C 9.4%) on basal/bolus insulin PTA. Unknown DM complications. Also COPD, secondary adrenal Insufficiency on chronic steroids, colorectal cancer s/p resection (colostomy bag), Chronic A fib on Eliquis, and tracheomalacia and multiple intubations, recent dx of OM presented to ED at [facility] with epigastric pain, belching and central chest pain. Found on CTA to have type A aortic dissection and transferred to [facility] for surgical evaluation by Dr. Cabrera. Now s/p aortic dissection repair on 9/07/22. Endocrine consulted for assistance with uncontrolled DM/steroids. Pt in ICU with ventricular dysfunction/sepsis intubated on pressors and NPO requiring insulin drip. Noted BG 90s to 200s while on drip. Average requirements 3 to 4 units/hr. On stress steroid doses due to present critical condition and h/o adrenal insufficiency.    BG goal (100-180mg/dl).

## 2022-09-28 NOTE — PROGRESS NOTE ADULT - SUBJECTIVE AND OBJECTIVE BOX
Follow Up:  fever    Interval History/ROS: pt was persistently hypotensive after intubation and now with increased lactate and transaminases in 7000s    Allergies  aspirin (Short breath)  Avelox (Short breath; Pruritus)  cefepime (Anaphylaxis)  codeine (Short breath)  Dilaudid (Short breath)  iodine (Short breath; Swelling)  penicillin (Anaphylaxis)  shellfish (Anaphylaxis)  tetanus toxoid (Short breath)  Valium (Short breath)        ANTIMICROBIALS:  meropenem  IVPB 1000 every 12 hours  meropenem  IVPB    nystatin    Suspension 507458 every 6 hours  vancomycin  IVPB 1000 every 24 hours      OTHER MEDS:  albuterol/ipratropium for Nebulization 3 milliLiter(s) Nebulizer every 6 hours  albuterol/ipratropium for Nebulization 3 milliLiter(s) Nebulizer every 6 hours  buDESOnide    Inhalation Suspension 0.5 milliGRAM(s) Inhalation every 12 hours  chlorhexidine 2% Cloths 1 Application(s) Topical <User Schedule>  collagenase Ointment 1 Application(s) Topical daily  dextrose 50% Injectable 50 milliLiter(s) IV Push every 15 minutes  DOBUTamine Infusion 10 MICROgram(s)/kG/Min IV Continuous <Continuous>  fentaNYL    Injectable 25 MICROGram(s) IV Push once  hydrocortisone sodium succinate Injectable 100 milliGRAM(s) IV Push every 8 hours  insulin regular Infusion 3 Unit(s)/Hr IV Continuous <Continuous>  mexiletine 200 milliGRAM(s) Oral every 8 hours  montelukast 10 milliGRAM(s) Oral at bedtime  multivitamin 1 Tablet(s) Oral daily  norepinephrine Infusion 0.05 MICROgram(s)/kG/Min IV Continuous <Continuous>  pantoprazole  Injectable 40 milliGRAM(s) IV Push daily  potassium chloride  10 mEq/50 mL IVPB 10 milliEquivalent(s) IV Intermittent every 1 hour  simethicone drops 80 milliGRAM(s) Oral every 12 hours PRN  sodium chloride 0.9%. 1000 milliLiter(s) IV Continuous <Continuous>  vasopressin Infusion 0.04 Unit(s)/Min IV Continuous <Continuous>      Vital Signs Last 24 Hrs  T(C): 36.6 (28 Sep 2022 08:00), Max: 37.7 (27 Sep 2022 12:00)  T(F): 97.9 (28 Sep 2022 08:00), Max: 99.9 (27 Sep 2022 12:00)  HR: 93 (28 Sep 2022 11:15) (78 - 109)  BP: 99/74 (28 Sep 2022 00:45) (71/35 - 142/47)  BP(mean): 81 (28 Sep 2022 00:45) (26 - 97)  RR: 24 (28 Sep 2022 11:15) (18 - 38)  SpO2: 99% (28 Sep 2022 11:15) (94% - 100%)    Parameters below as of 28 Sep 2022 08:00  Patient On (Oxygen Delivery Method): ventilator    O2 Concentration (%): 80    Physical Exam:  General:   intubated, sedated  Respiratory:  clear b/l,    no wheezing  abd:     soft,   BS +,   no tenderness, ostomy with liquid brown stool  :   no CVAT,  no suprapubic tenderness,   no  ramierz  Musculoskeletal:   no joint swelling  vascular: R chest port with no tenderness or erythema, RIJ                          9.0    9.03  )-----------( 184      ( 28 Sep 2022 04:39 )             29.1           144  |  101  |  41<H>  ----------------------------<  200<H>  3.7   |  20<L>  |  1.53<H>    Ca    10.1      28 Sep 2022 04:39  Phos  5.5       Mg     2.7         TPro  6.7  /  Alb  4.1  /  TBili  2.1<H>  /  DBili  x   /  AST  7768<H>  /  ALT  3612<H>  /  AlkPhos  112        Urinalysis Basic - ( 27 Sep 2022 13:57 )    Color: Yellow / Appearance: Clear / S.023 / pH: x  Gluc: x / Ketone: Negative  / Bili: Negative / Urobili: Negative   Blood: x / Protein: 30 mg/dL / Nitrite: Negative   Leuk Esterase: Large / RBC: 3 /hpf / WBC 67 /HPF   Sq Epi: x / Non Sq Epi: 2 /hpf / Bacteria: Negative        MICROBIOLOGY:  v  Trach Asp Tracheal Aspirate  22 --  --    Moderate polymorphonuclear leukocytes per low power field  No Squamous epithelial cells per low power field  Moderate Gram Positive Cocci in Clusters seen per oil power field      .Blood Blood-Peripheral  22   No growth to date.  --  --      .Blood Blood  22   No Growth Final  --  --      Catheterized Catheterized  22   50,000 - 99,000 CFU/mL Candida albicans "Susceptibilities not performed"  --  --      .Blood Blood-Peripheral  22   No Growth Final  --  --      .Blood Blood  22   No Growth Final  --  --      .Blood Blood  09-10-22   No Growth Final  --  --      Catheterized Catheterized  09-10-22   <10,000 CFU/mL Normal Urogenital Jessica  --  --      .Sputum Sputum  22   Numerous Proteus mirabilis  Unable to evaluate further due to Proteus overgrowth  --  Proteus mirabilis                RADIOLOGY:  Images independently visualized and reviewed personally, findings as below  < from: Xray Abdomen 1 View PORTABLE -Urgent (Xray Abdomen 1 View PORTABLE -Urgent .) (22 @ 07:14) >  IMPRESSION:  Nonobstructive bowel gas pattern.    < end of copied text >  < from: Xray Chest 1 View- PORTABLE-Urgent (Xray Chest 1 View- PORTABLE-Urgent .) (22 @ 02:21) >  FINDINGS:  Right IJ cordis sheath in place close to right IJ/SVC articulation.  Right IJ Mediport with tip in SVC.  S/P sternotomy; heart valve replacement.  ET and NG tubes are stable in appearance.  Heart/Vascular: Heart is stable in appearance.  Pulmonary: Persistent silhouetting of the left hemidiaphragm which may be   due to left basilar airspace disease and small left pleural effusion. The   right lung is grossly clear. No pneumothorax.  Bones: No acute bony finding    Impression:  Tubes and lines in place.  No significant interval change in bilateral pleural-parenchymal pattern.      < end of copied text >

## 2022-09-28 NOTE — PROGRESS NOTE ADULT - SUBJECTIVE AND OBJECTIVE BOX
DIABETES FOLLOW UP NOTE: Saw pt earlier today    Chief Complaint: Endocrine consult requested for management of T2DM    INTERVAL HX: Saw pt in CTU, intubated, sedated and on pressors. Noted BG 90s to 200s while on insulin drip rates between 0 to 7 units/hr per flowsheet review No hypoglycemia. Remains NPO. On antibiotics for sepsis with + transaminitis. On stress steroid doses while critically ill.       Review of Systems:  General: As above  Unable      Allergies    aspirin (Short breath)  Avelox (Short breath; Pruritus)  cefepime (Anaphylaxis)  codeine (Short breath)  Dilaudid (Short breath)  iodine (Short breath; Swelling)  penicillin (Anaphylaxis)  shellfish (Anaphylaxis)  tetanus toxoid (Short breath)  Valium (Short breath)    Intolerances      MEDICATIONS:  hydrocortisone sodium succinate Injectable 100 milliGRAM(s) IV Push every 8 hours  insulin regular Infusion 3 Unit(s)/Hr (3 mL/Hr) IV Continuous <Continuous>  meropenem  IVPB 1000 milliGRAM(s) IV Intermittent every 12 hours  norepinephrine Infusion 0.05 MICROgram(s)/kG/Min (6.35 mL/Hr) IV Continuous <Continuous>  vancomycin  IVPB 1000 milliGRAM(s) IV Intermittent every 24 hours  vasopressin Infusion 0.04 Unit(s)/Min (6 mL/Hr) IV Continuous <Continuous>      PHYSICAL EXAM:  VITALS: T(C): 37.1 (09-28-22 @ 12:00)  T(F): 98.8 (09-28-22 @ 12:00), Max: 99.8 (09-27-22 @ 20:00)  HR: 93 (09-28-22 @ 14:00) (78 - 109)  BP: 99/74 (09-28-22 @ 00:45) (71/35 - 142/47)  RR:  (23 - 38)  SpO2:  (94% - 100%)  Wt(kg): --  GENERAL: Female laying in bed in NAD. sedated  HEENT: Intubated  Abdomen: Soft, nontender, non distended  Extremities: Warm, venodynes on.   NEURO: Sedated    LABS:  POCT Blood Glucose.: 100 mg/dL (09-28-22 @ 13:05)  POCT Blood Glucose.: 118 mg/dL (09-28-22 @ 11:50)  POCT Blood Glucose.: 125 mg/dL (09-28-22 @ 11:23)  POCT Blood Glucose.: 153 mg/dL (09-28-22 @ 08:54)  POCT Blood Glucose.: 157 mg/dL (09-28-22 @ 07:39)  POCT Blood Glucose.: 156 mg/dL (09-28-22 @ 06:42)  POCT Blood Glucose.: 181 mg/dL (09-28-22 @ 06:10)  POCT Blood Glucose.: 178 mg/dL (09-28-22 @ 05:23)  POCT Blood Glucose.: 175 mg/dL (09-28-22 @ 04:47)  POCT Blood Glucose.: 200 mg/dL (09-28-22 @ 04:05)  POCT Blood Glucose.: 233 mg/dL (09-28-22 @ 02:57)  POCT Blood Glucose.: 253 mg/dL (09-28-22 @ 01:42)  POCT Blood Glucose.: 235 mg/dL (09-27-22 @ 23:52)  POCT Blood Glucose.: 252 mg/dL (09-27-22 @ 23:06)  POCT Blood Glucose.: 90 mg/dL (09-27-22 @ 22:06)  POCT Blood Glucose.: 108 mg/dL (09-27-22 @ 21:08)  POCT Blood Glucose.: 141 mg/dL (09-27-22 @ 20:03)  POCT Blood Glucose.: 197 mg/dL (09-27-22 @ 18:03)  POCT Blood Glucose.: 99 mg/dL (09-27-22 @ 17:03)  POCT Blood Glucose.: 98 mg/dL (09-27-22 @ 15:11)  POCT Blood Glucose.: 113 mg/dL (09-27-22 @ 14:09)  POCT Blood Glucose.: 173 mg/dL (09-27-22 @ 13:09)  POCT Blood Glucose.: 271 mg/dL (09-27-22 @ 11:48)  POCT Blood Glucose.: 350 mg/dL (09-27-22 @ 05:46)  POCT Blood Glucose.: 241 mg/dL (09-27-22 @ 02:07)  POCT Blood Glucose.: 132 mg/dL (09-26-22 @ 22:31)  POCT Blood Glucose.: 167 mg/dL (09-26-22 @ 19:49)  POCT Blood Glucose.: 244 mg/dL (09-26-22 @ 16:10)  POCT Blood Glucose.: 339 mg/dL (09-26-22 @ 11:20)  POCT Blood Glucose.: 131 mg/dL (09-26-22 @ 07:27)  POCT Blood Glucose.: 166 mg/dL (09-26-22 @ 06:09)  POCT Blood Glucose.: 213 mg/dL (09-26-22 @ 05:19)  POCT Blood Glucose.: 216 mg/dL (09-26-22 @ 04:01)  POCT Blood Glucose.: 245 mg/dL (09-26-22 @ 03:06)  POCT Blood Glucose.: 336 mg/dL (09-26-22 @ 02:01)  POCT Blood Glucose.: 179 mg/dL (09-25-22 @ 20:52)  POCT Blood Glucose.: 110 mg/dL (09-25-22 @ 16:33)                            9.0    9.03  )-----------( 184      ( 28 Sep 2022 04:39 )             29.1       09-28    144  |  101  |  41<H>  ----------------------------<  200<H>  3.7   |  20<L>  |  1.53<H>    eGFR: 35<L>    Ca    10.1      09-28  Mg     2.7     09-28  Phos  5.5     09-28    TPro  6.7  /  Alb  4.1  /  TBili  2.1<H>  /  DBili  x   /  AST  7768<H>  /  ALT  3612<H>  /  AlkPhos  112  09-28    Thyroid Function Tests:  09-06 @ 16:46 TSH 3.30 FreeT4 -- T3 -- Anti TPO -- Anti Thyroglobulin Ab -- TSI --      A1C with Estimated Average Glucose Result: 9.4 % (09-06-22 @ 16:46)  A1C with Estimated Average Glucose Result: 9.2 % (07-11-22 @ 07:36)      Estimated Average Glucose: 223 mg/dL (09-06-22 @ 16:46)  Estimated Average Glucose: 217 mg/dL (07-11-22 @ 07:36)

## 2022-09-28 NOTE — PROGRESS NOTE ADULT - PROBLEM SELECTOR PLAN 1
-test BG hourly while on insulin gtt. Once off gtt can check q6h if NPO/TF and AC/HS/2AM daily once eating again  -C/w Insulin drip to BG goal 100 to 180s. Would consider stating 3-4 units which is average dose needed instead of starting 5 to 7 units which can place pt at risk for hypoglycemia.   -Discontinue TFs/PO diet order. Still active  -Contact endo team once pt is more stable to restart SQ insulin again  Discharge plan:  - Likely to discharge patient home on basal/bolus insulin. Final regimen pending clinical course.  - Recommend routine outpatient ophthalmology, podiatry  - Can f/u with endocrinologist Dr. Saavedra.  - Make sure pt has Rx for all DM supplies and insulin/ DM meds.  -Based on pt's clinical condition and mental status at this time she is not able to independently manage her DM. Will evaluate pt's ability to manage DM at time of discharge. If unable> pt will need family/ care giver (s) to manage DM care at home  -Will need rehab.

## 2022-09-28 NOTE — PROGRESS NOTE ADULT - SUBJECTIVE AND OBJECTIVE BOX
CRITICAL CARE ATTENDING - CTICU    MEDICATIONS  (STANDING):  albuterol/ipratropium for Nebulization 3 milliLiter(s) Nebulizer every 6 hours  albuterol/ipratropium for Nebulization 3 milliLiter(s) Nebulizer every 6 hours  buDESOnide    Inhalation Suspension 0.5 milliGRAM(s) Inhalation every 12 hours  chlorhexidine 2% Cloths 1 Application(s) Topical <User Schedule>  collagenase Ointment 1 Application(s) Topical daily  dextrose 50% Injectable 50 milliLiter(s) IV Push every 15 minutes  DOBUTamine Infusion 10 MICROgram(s)/kG/Min (20.3 mL/Hr) IV Continuous <Continuous>  fentaNYL    Injectable 25 MICROGram(s) IV Push once  hydrocortisone sodium succinate Injectable 100 milliGRAM(s) IV Push every 8 hours  insulin regular Infusion 3 Unit(s)/Hr (3 mL/Hr) IV Continuous <Continuous>  meropenem  IVPB 1000 milliGRAM(s) IV Intermittent every 12 hours  meropenem  IVPB      mexiletine 200 milliGRAM(s) Oral every 8 hours  montelukast 10 milliGRAM(s) Oral at bedtime  multivitamin 1 Tablet(s) Oral daily  norepinephrine Infusion 0.05 MICROgram(s)/kG/Min (6.35 mL/Hr) IV Continuous <Continuous>  nystatin    Suspension 494342 Unit(s) Oral every 6 hours  pantoprazole  Injectable 40 milliGRAM(s) IV Push daily  sodium chloride 0.9%. 1000 milliLiter(s) (50 mL/Hr) IV Continuous <Continuous>  vancomycin  IVPB 1000 milliGRAM(s) IV Intermittent every 24 hours  vasopressin Infusion 0.04 Unit(s)/Min (6 mL/Hr) IV Continuous <Continuous>                                    9.0    9.03  )-----------( 184      ( 28 Sep 2022 04:39 )             29.1       -    144  |  101  |  41<H>  ----------------------------<  200<H>  3.7   |  20<L>  |  1.53<H>    Ca    10.1      28 Sep 2022 04:39  Phos  5.5       Mg     2.7         TPro  6.7  /  Alb  4.1  /  TBili  2.1<H>  /  DBili  x   /  AST  7768<H>  /  ALT  3612<H>  /  AlkPhos  112        PT/INR - ( 28 Sep 2022 03:09 )   PT: 43.6 sec;   INR: 3.71 ratio         PTT - ( 28 Sep 2022 03:09 )  PTT:33.1 sec    Mode: AC/ CMV (Assist Control/ Continuous Mandatory Ventilation)  RR (machine): 30  TV (machine): 400  FiO2: 80  PEEP: 5  ITime: 0.9  MAP: 19  PC: 30  PIP: 35      Daily     Daily Weight in k.5 (28 Sep 2022 00:00)       @ 07:  -   @ 07:00  --------------------------------------------------------  IN: 5027.3 mL / OUT: 3110 mL / NET: 1917.3 mL     @ 07: @ 08:21  --------------------------------------------------------  IN: 48.3 mL / OUT: 210 mL / NET: -161.7 mL          Critically Ill patient  : [ ] preoperative ,   [x ] post operative    Requires :  [x ] Arterial Line   [x ] Central Line  [ ] PA catheter  [ ] IABP  [ ] ECMO  [ ] LVAD  [ ] Ventilator  [x ] pacemaker - TPM [ ] Impella.                      [x ] ABG's     [ x] Pulse Oxymetry Monitoring  Bedside evaluation , monitoring , treatment of hemodynamics , fluids , IVP/ IVCD meds.        Diagnosis:      - Modified bentall and hemiarch     Admitted - chest / back pain     Type A Aortic Dissection      Requires chest PT, pulmonary toilet, ambu bagging, suctioning to maintain SaO2,  patent airway and treat atelectasis.     Difficult weaning process - multiple organ system involvement in critically ill patient     Ventilator Management:  [x]AC-rest    [ x]CPAP-PS Wean    [ ]Trach Collar     [ ]Extubate    [ ] T-Piece  [x ]peep>5    Hemodynamic lability,  instability. Requires IVCD [x] vasopressors [x ]  inotropes  [ ] vasodilator  [ ]IVSS fluid  to maintain MAP, perfusion, C.I.      DM- IVCD Insulin     Tolerates NG / NJ feeds at [x] goal rate    [ ] trophic rate    [ ]       rate      Requires bedside physical therapy, mobilization and total detention care           COPD     Colostomy - colon CA     Chronic Renal Failure                   IBon, personally performed the services described in this documentation. All medical record entries made by the scribe were at my direction and in my presence. I have reviewed the chart and agree that the record reflects my personal performance and is accurate and complete.   Bon Jiménez MD.       By signing my name below, I, Kieran Plascencia, attest that this documentation has been prepared under the direction and in the presence of Bon Jiménez MD.   Electronically Signed: Georgi Cordero - @ 08:21        Discussed with CT surgeon, Physician Assistant - Nurse Practitioner- Critical care medicine team.   Dicussed at  AM / PM rounds.   Chart, labs , films reviewed.    Total Time:  CRITICAL CARE ATTENDING - CTICU    MEDICATIONS  (STANDING):  albuterol/ipratropium for Nebulization 3 milliLiter(s) Nebulizer every 6 hours  albuterol/ipratropium for Nebulization 3 milliLiter(s) Nebulizer every 6 hours  buDESOnide    Inhalation Suspension 0.5 milliGRAM(s) Inhalation every 12 hours  chlorhexidine 2% Cloths 1 Application(s) Topical <User Schedule>  collagenase Ointment 1 Application(s) Topical daily  dextrose 50% Injectable 50 milliLiter(s) IV Push every 15 minutes  DOBUTamine Infusion 10 MICROgram(s)/kG/Min (20.3 mL/Hr) IV Continuous <Continuous>  fentaNYL    Injectable 25 MICROGram(s) IV Push once  hydrocortisone sodium succinate Injectable 100 milliGRAM(s) IV Push every 8 hours  insulin regular Infusion 3 Unit(s)/Hr (3 mL/Hr) IV Continuous <Continuous>  meropenem  IVPB 1000 milliGRAM(s) IV Intermittent every 12 hours  meropenem  IVPB      mexiletine 200 milliGRAM(s) Oral every 8 hours  montelukast 10 milliGRAM(s) Oral at bedtime  multivitamin 1 Tablet(s) Oral daily  norepinephrine Infusion 0.05 MICROgram(s)/kG/Min (6.35 mL/Hr) IV Continuous <Continuous>  nystatin    Suspension 139487 Unit(s) Oral every 6 hours  pantoprazole  Injectable 40 milliGRAM(s) IV Push daily  sodium chloride 0.9%. 1000 milliLiter(s) (50 mL/Hr) IV Continuous <Continuous>  vancomycin  IVPB 1000 milliGRAM(s) IV Intermittent every 24 hours  vasopressin Infusion 0.04 Unit(s)/Min (6 mL/Hr) IV Continuous <Continuous>                                    9.0    9.03  )-----------( 184      ( 28 Sep 2022 04:39 )             29.1       -    144  |  101  |  41<H>  ----------------------------<  200<H>  3.7   |  20<L>  |  1.53<H>    Ca    10.1      28 Sep 2022 04:39  Phos  5.5       Mg     2.7         TPro  6.7  /  Alb  4.1  /  TBili  2.1<H>  /  DBili  x   /  AST  7768<H>  /  ALT  3612<H>  /  AlkPhos  112        PT/INR - ( 28 Sep 2022 03:09 )   PT: 43.6 sec;   INR: 3.71 ratio         PTT - ( 28 Sep 2022 03:09 )  PTT:33.1 sec    Mode: AC/ CMV (Assist Control/ Continuous Mandatory Ventilation)  RR (machine): 30  TV (machine): 400  FiO2: 80  PEEP: 5  ITime: 0.9  MAP: 19  PC: 30  PIP: 35      Daily     Daily Weight in k.5 (28 Sep 2022 00:00)       @ 07:  -   @ 07:00  --------------------------------------------------------  IN: 5027.3 mL / OUT: 3110 mL / NET: 1917.3 mL     @ 07: @ 08:21  --------------------------------------------------------  IN: 48.3 mL / OUT: 210 mL / NET: -161.7 mL          Critically Ill patient  : [ ] preoperative ,   [x ] post operative    Requires :  [x ] Arterial Line   [x ] Central Line  [ ] PA catheter  [ ] IABP  [ ] ECMO  [ ] LVAD  [ ] Ventilator  [x ] pacemaker - TPM [ ] Impella.                      [x ] ABG's     [ x] Pulse Oxymetry Monitoring  Bedside evaluation , monitoring , treatment of hemodynamics , fluids , IVP/ IVCD meds.        Diagnosis:     Re Admit to CTICU - Septic Shock    Hypovolemia     Lactic ACidosis     - Modified bentall and hemiarch     Admitted - chest / back pain     Type A Aortic Dissection      Respiratory Failure     Requires chest PT, pulmonary toilet, ambu bagging, suctioning to maintain SaO2,  patent airway and treat atelectasis.     Difficult weaning process - multiple organ system involvement in critically ill patient     Ventilator Management:  [x]AC-rest -   PC    [ ]CPAP-PS Wean    [ ]Trach Collar     [ ]Extubate    [ ] T-Piece  [x ]peep>5    Hemodynamic lability,  instability. Requires IVCD [x] vasopressors [x ]  inotropes  [ ] vasodilator  [ ]IVSS fluid  to maintain MAP, perfusion, C.I.      DM- IVCD Insulin      Requires bedside physical therapy, mobilization and total penitentiary care           COPD     Colostomy - colon CA     Renal Failure - Acute Kidney Injury     CT - CH / AB / PLVS                        IBon, personally performed the services described in this documentation. All medical record entries made by the scribe were at my direction and in my presence. I have reviewed the chart and agree that the record reflects my personal performance and is accurate and complete.   Bon Jiménez MD.       By signing my name below, I, Kieran Plascencia, attest that this documentation has been prepared under the direction and in the presence of Bon Jiménez MD.   Electronically Signed: Georgi Cordero - @ 08:21        Discussed with CT surgeon, Physician Assistant - Nurse Practitioner- Critical care medicine team.   Dicussed at  AM / PM rounds.   Chart, labs , films reviewed.    Total Time:  90 min

## 2022-09-28 NOTE — PROGRESS NOTE ADULT - ASSESSMENT
71 year-old female with a history of DM2, CAD, A-Fib on apixaban, Severe Persistent Asthma (on chronic steroids, recently started on Tezspire), colon cancer s/p resection/chemo, and tracheobronchomalacia s/p tracheoplasty, and recent OM of the R foot s/b debridement and completed course of Vancomycin/Ertapenem for MRSA/ESBL Proteus/Corynebacterium who now presents with chest pain, found to have Type A dissection s/p repair with modified Bentall procedure and hemiarch replacement on 9/6/22. Post-op course complicated by acute hypoxemic respiratory failure, shock, anemia, and hyperglycemia requiring insulin gtt. Extubated 9/13, now awake and alert with mild encephalopathy but overall significantly improved.    Assessment:  Acute hypoxemic respiratory failure requiring intubation  Type A Aortic Dissection  Severe Persistent Asthma  History of Tracheobronchomalacia    Plan:  - Currently doing well on NC O2-2 liters NC  - If respiratory status remains stable, goal SpO2>92%  - Continue prednisone at 8 mg daily (chronic dose for her severe persistent asthma)  - Albuterol and ipratropium nebs q6h    - Budesonide 0.5 mg nebs q12h    - Chest PT, incentive spirometry, out of bed to chair  - S/p Tezspire injection on 8/29  - montelukast 10 mg at bedtime (takes Zafirlukast as outpatient)  - Remains on eliquis for A-Fib + RIJ thrombus  - R arm elevation and warm compresses for superficial thrombophlebitis with edema  - Remains on mexiletine and metoprolol for A-Fib  - Currently appears euvolemic, on furosemide 40 mg oral daily, strict I/O's, close monitoring of kidney function and lytes  - s/p meropenem for Proteus mirabilis pneumonia + ESBL Proteus mirabilis infection of right foot, f/up ID and podiatry  -, full code  **************************                                SEE Below:  9/14-improving but weakness  9/15-ABX adjusted to ceftriaxone (LFTS)-PICU  9/16-PICU, low grade temp-fever work up in progress, no resp decline or sx  9/19-resp stable at present but tenuous  9/20-for FEESST today, NGT in place w/TF  9/21-s/p FEESST-passed, remains in PICU          9/22-TF in place at 40cc, more OOB          9/23-hypoglycemia noted and rx, no change in resp status  9/28-vented/sedated-pressors/inotropes/ABX

## 2022-09-28 NOTE — PROGRESS NOTE ADULT - TIME BILLING
as above: resp failure-sepsis physiology-inotropes/pressors/vented/ABX  multifactorial dyspnea-resp failure-severe persistent asthma, TBM s/p tracheoplasty, s/p Aortic aneurysm repair, Bronchitis (proteus)-O2-keep 90%; wean as able once HD stable  severe persistent asthma--medrol 8mg changed to hydrocortisone 100 q 8, singulair 10, duoneb q 6, budes .5 bid, tezspire 8/29-next 9/29  TBM-s/p tracheoplasty--accapella, unable to use vest due to surgery  s/p Aortic aneurysm repair--as per [facility] staff care  AF-on heparin--eventual eliquis rx               CV-mult pressors/inotropes-MAP above 60  DVT R-IJ-on heparin rx  ID-s/p proteus bronchitis-s/p meropenum changed to ceftriaxone and back to meropenem/vanco- off of ABX as of 9/19--restart of ABX 9/27-meropenem/vanco (check all cx)  PT-OOB as able           VC dysfunction--aspiration precautions-sp and sw evaln--NGT in place/TF; FEESST passed 9/20  [facility]                                    Heme onc f/up colon ca  prog--critical in CTU                PT-when/if improved    Eulalio Allison MD-Pulmonary   [phone]

## 2022-09-28 NOTE — PROGRESS NOTE ADULT - NSPROGADDITIONALINFOA_GEN_ALL_CORE
-Plan discussed with pt/team.  Contact info: [phone] (24/7). pager [phone]  [email or web address] password [email or web address]  Spent 29 minutes assessing pt/labs/meds and discussing plan of care with primary team  Adjusting insulin  Discharge plan  Follow up care

## 2022-09-28 NOTE — PROGRESS NOTE ADULT - PROBLEM SELECTOR PLAN 2
On chronic steroids at home: medrol 8mg qd   Per Dr Sherwood needs stress dose steroid  Hydrocortisone 50mg q8h while on pressors. Once pt is off pressors will start slow taper back to oral Prednisone chronic dose.  Pt remains on pressors. Please contact endo team once off pressors for tapering doses

## 2022-09-28 NOTE — PROGRESS NOTE ADULT - SUBJECTIVE AND OBJECTIVE BOX
Patient seen and examined at the bedside.    Remained critically ill on continuous ICU monitoring.    OBJECTIVE:  Vital Signs Last 24 Hrs  T(C): 36.5 (28 Sep 2022 20:00), Max: 37.3 (28 Sep 2022 00:00)  T(F): 97.7 (28 Sep 2022 20:00), Max: 99.1 (28 Sep 2022 00:00)  HR: 94 (28 Sep 2022 20:45) (78 - 109)  BP: 105/51 (28 Sep 2022 20:45) (71/35 - 153/67)  BP(mean): 74 (28 Sep 2022 20:45) (36 - 100)  RR: 20 (28 Sep 2022 20:45) (20 - 32)  SpO2: 100% (28 Sep 2022 20:45) (94% - 100%)    Parameters below as of 28 Sep 2022 20:00  Patient On (Oxygen Delivery Method): ventilator    O2 Concentration (%): 50    Physical Exam:   General: elderly female, OOBTC, NAD  Neurology: Sedated  ENT/Neck: Neck supple, trachea midline, No JVD  Respiratory: rhonchi throughout    CV: S1S2, no murmurs        [x] Sinus Rhythm   Abdominal: Soft, NT, ND, colostomy in place  Extremities: trace pedal edema noted, + peripheral pulses   Skin: Dry dressing over right heel, no Rashes, Hematoma, Ecchymosis       ============================I/O===========================   I&O's Detail    27 Sep 2022 07:01  -  28 Sep 2022 07:00  --------------------------------------------------------  IN:    Albumin 5%  - 250 mL: 2000 mL    DOBUTamine: 147 mL    DOBUTamine: 10.2 mL    Enteral Tube Flush: 225 mL    Insulin: 74.5 mL    IV PiggyBack: 575 mL    Norepinephrine: 548.3 mL    Phenylephrine: 497.3 mL    PRBCs (Packed Red Blood Cells): 300 mL    Sodium Bicarbonate: 100 mL    sodium chloride 0.9%: 250 mL    Vasopressin: 300 mL  Total IN: 5027.3 mL    OUT:    Colostomy (mL): 150 mL    Indwelling Catheter - Urethral (mL): 2710 mL    Nasogastric/Oral tube (mL): 250 mL  Total OUT: 3110 mL    Total NET: 1917.3 mL      28 Sep 2022 07:01  -  28 Sep 2022 20:56  --------------------------------------------------------  IN:    Albumin 5%  - 250 mL: 250 mL    Dexmedetomidine: 113 mL    DOBUTamine: 121.6 mL    DOBUTamine: 76 mL    Insulin: 46.5 mL    IV PiggyBack: 600 mL    Norepinephrine: 21.5 mL    PRBCs (Packed Red Blood Cells): 300 mL    sodium chloride 0.9%: 100 mL    Vasopressin: 97.5 mL  Total IN: 1726.1 mL    OUT:    Indwelling Catheter - Urethral (mL): 1285 mL  Total OUT: 1285 mL    Total NET: 441.1 mL    ============================ LABS =========================                        8.2    14.95 )-----------( 162      ( 28 Sep 2022 18:50 )             25.6         143  |  101  |  43<H>  ----------------------------<  195<H>  4.0   |  25  |  1.51<H>    Ca    9.4      28 Sep 2022 19:26  Phos  5.5       Mg     2.7         TPro  6.3  /  Alb  3.9  /  TBili  2.0<H>  /  DBili  x   /  AST  2742<H>  /  ALT  2666<H>  /  AlkPhos  117      LIVER FUNCTIONS - ( 28 Sep 2022 19:26 )  Alb: 3.9 g/dL / Pro: 6.3 g/dL / ALK PHOS: 117 U/L / ALT: 2666 U/L / AST: 2742 U/L / GGT: x           PT/INR - ( 28 Sep 2022 18:50 )   PT: 46.7 sec;   INR: 4.00 ratio         PTT - ( 28 Sep 2022 03:09 )  PTT:33.1 sec  ABG - ( 28 Sep 2022 18:06 )  pH, Arterial: 7.45  pH, Blood: x     /  pCO2: 38    /  pO2: 96    / HCO3: 26    / Base Excess: 2.3   /  SaO2: 98.6          Urinalysis Basic - ( 27 Sep 2022 13:57 )    Color: Yellow / Appearance: Clear / S.023 / pH: x  Gluc: x / Ketone: Negative  / Bili: Negative / Urobili: Negative   Blood: x / Protein: 30 mg/dL / Nitrite: Negative   Leuk Esterase: Large / RBC: 3 /hpf / WBC 67 /HPF   Sq Epi: x / Non Sq Epi: 2 /hpf / Bacteria: Negative    ======================Micro/Rad/Cardio=================  Culture: Reviewed   CXR: Reviewed  Echo: Reviewed  ======================================================  PAST MEDICAL & SURGICAL HISTORY:  Atrial fibrillation  paroxysmal, on eliquis    Diabetes  Type 2    COPD (chronic obstructive pulmonary disease)    Adrenal insufficiency  Medrol daily for over 50 years    Aortic insufficiency  moderate AR on echo 5/3/2018    Pelvic fracture    Asthma    Tracheobronchomalacia  diagnosed , s/p bronchial thermoplasty  (Dr Zapien); recent bronchoscopy 2018 revealed no evidence of tracheobronchomalacia in trachea or bronchial tubes    Colorectal cancer  2018- last treatment , chemo and radiation    Rectal bleeding    Seizure  x 1 18    DVT (deep venous thrombosis)  15-20 years ago, took coumadin    TIA (transient ischemic attack)  multiple, last 5 years ago - presents as right-sided weakness    History of partial hysterectomy  30 years ago - fibroids    H/O total knee replacement, bilateral  5 years ago    History of sinus surgery  multiple sinus surgeries    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    H/O pelvic surgery  5 years ago - s/p fracture    History of tracheomalacia   - attempted tracheal stenting ([facility])- course complicated by obstruction, respiratory failure, multiple CPR attempts -  stent discontinued; 10/20/2016 Tracheobronchoplasty (Prolene Mesh) performed at [facility] by Dr Zapien    S/P bronchoscopy  2018 - [place] (Dr Zapien) no evidence of tracheobronchomalacia in trachea or bronchial tubes    Rectal bleeding  exam under anesthesia ([facility]) 2018    ======================================================  Assessment:  71F PMH DM, COPD, Chronic Adrenal Insufficiency on Chronic prednisone, history of colorectal cancer s/p resection (colostomy bag), Hx of CAD, Chronic A fib on Eliquis, and tracheomalacia and multiple intubations, recent dx of OM presented to ED at [facility] this morning with epigastric pain, belching and central chest pain. Found on CTA to have type A aortic dissection and transferred to [facility] for surgical evaluation by Dr. Cabrera.     Ascending aortic dissection s/p modified bentall and hemiarch on    Hypovolemic shock   Post op respiratory insufficiency  Acute blood loss anemia  Stress hyperglycemia   Lactic Acidosis  RIJ thrombus  Pancreatic cyst    Proteus PNA  Leukocytosis  ======================================================  Plan:   ***Neuro***  [x] Sedated with [x] Precedex   Post operative neuro assessment     ***Cardiovascular***  : Patient returned to ICU for presumed sepsis, presenting with tachypnea, fevers, tachycardia  CT Chest on :  Status post recent ascending aortic dissection repair. Small amount of fluid surrounding the ascending aorta without evidence of enhancing wall to suggest abscess.  RUE duplex on : There is a thrombosed and occluded RIJ     Invasive hemodynamic monitoring, assess perfusion indices   SR/ CVP 13/ MAP 71/ Hct 25.6%/ Lactate 4.2  [x] Vasopressin - 0.04units.min [x] Levophed- 0.05mcgs [x] Dobutamine- 7.5mcgs   Continue Mexiletine for VT   [x] AC therapy with Eliquis held at this time     ***Pulmonary***  CT Chest on : Bilateral lower lobe atelectasis with bilateral pleural effusions   Reintubated for change in mental status on , self extubated on  and reintubated again, extubated to HFO2 on  ReIntubated for airway protection (high peak/plateau pressures on volume control. Switched to Pressure control.) ARDS ?   Full vent support / Mar -20ppm   Hx of COPD / Continue bronchodilators   Monitor secretions and suction PRN     Mode: AC/ CMV (Assist Control/ Continuous Mandatory Ventilation)  RR (machine): 20  FiO2: 40  PEEP: 5  ITime: 0.9  MAP: 10  PC: 20  PIP: 22              ***GI***  CT A/P on : Mild thickening of the cecum and ascending colon possibly representing mild nonspecific proximal colitis. Hepatic cirrhosis. The focal IPMN of the pancreas with large cyst within the tail the pancreas measuring 3.1 cm.  Colostomy bag in place, continue to monitor output   Passed FEES on , CC diet with reg liquids, pt with decreased appetite need to encourage PO intake    [x] Tolerating tube feeds with Glucerna 1.5 Chago @40cc/hr   [x] Protonix   Simethicone PRN gas    ***Renal***  Continue to monitor I/Os, BUN/Creatinine.   Replete lytes PRN  Amezcua placed     ***ID***  SCx on  +Proteus mirabilis   UA on + LE, WBC 60, few yeast  /  UCx on 9/10 NG   BCx on 9/10 NGTD, BCx on  NGTD  bcx NGTD  Nystatin for thrush   Broad spectrum antibiotics (meropenem and vancomycin started for presumed sepsis)     ***Endocrine***  [x]  DM : HbA1c 9.4%                - [x] Insulin gtt              - Need tight glycemic control to prevent wound infection.  Endo following for recs now that patient has some PO intake with tube feed supplementation         Patient requires continuous monitoring with:  bedside rhythm monitoring, arterial line, pulse oximetry, ventilator management and monitoring; intermittent blood gas analysis.  Care plan discussed with ICU care team.  patient remain critical; required more than usual post op care; I have spent 30 minutes providing non routine post op care, revaluated multiple times during the day .     Care Plan discussed with the ICU care team. Patient remained critical, at risk for life threatening decompensation.     By signing my name below, I, Bharti Barrios, attest that this documentation has been prepared under the direction and in the presence of Jethro Nur MD.  Electronically signed: Georgi Gottlieb, 22 @ 20:56    I, Liudmila Morelos, personally performed the services described in this documentation. all medical record entries made by the scribe were at my direction and in my presence. I have reviewed the chart and agree that the record reflects my personal performance and is accurate and complete  Electronically signed: Jethro Nur MD. Patient seen and examined at the bedside.    Remained critically ill on continuous ICU monitoring.    OBJECTIVE:  Vital Signs Last 24 Hrs  T(C): 36.5 (28 Sep 2022 20:00), Max: 37.3 (28 Sep 2022 00:00)  T(F): 97.7 (28 Sep 2022 20:00), Max: 99.1 (28 Sep 2022 00:00)  HR: 94 (28 Sep 2022 20:45) (78 - 109)  BP: 105/51 (28 Sep 2022 20:45) (71/35 - 153/67)  BP(mean): 74 (28 Sep 2022 20:45) (36 - 100)  RR: 20 (28 Sep 2022 20:45) (20 - 32)  SpO2: 100% (28 Sep 2022 20:45) (94% - 100%)    Parameters below as of 28 Sep 2022 20:00  Patient On (Oxygen Delivery Method): ventilator    O2 Concentration (%): 50    Physical Exam:   General: intubated,  Neurology: Sedated  ENT/Neck: Neck supple, trachea midline, No JVD  Respiratory: rhonchi throughout    CV: S1S2, no murmurs        [x] Sinus Rhythm   Abdominal: Soft, NT, ND, colostomy in place  Extremities: trace pedal edema noted, + peripheral pulses   Skin: Dry dressing over right heel, no Rashes, Hematoma, Ecchymosis       ============================I/O===========================   I&O's Detail    27 Sep 2022 07:01  -  28 Sep 2022 07:00  --------------------------------------------------------  IN:    Albumin 5%  - 250 mL: 2000 mL    DOBUTamine: 147 mL    DOBUTamine: 10.2 mL    Enteral Tube Flush: 225 mL    Insulin: 74.5 mL    IV PiggyBack: 575 mL    Norepinephrine: 548.3 mL    Phenylephrine: 497.3 mL    PRBCs (Packed Red Blood Cells): 300 mL    Sodium Bicarbonate: 100 mL    sodium chloride 0.9%: 250 mL    Vasopressin: 300 mL  Total IN: 5027.3 mL    OUT:    Colostomy (mL): 150 mL    Indwelling Catheter - Urethral (mL): 2710 mL    Nasogastric/Oral tube (mL): 250 mL  Total OUT: 3110 mL    Total NET: 1917.3 mL      28 Sep 2022 07:01  -  28 Sep 2022 20:56  --------------------------------------------------------  IN:    Albumin 5%  - 250 mL: 250 mL    Dexmedetomidine: 113 mL    DOBUTamine: 121.6 mL    DOBUTamine: 76 mL    Insulin: 46.5 mL    IV PiggyBack: 600 mL    Norepinephrine: 21.5 mL    PRBCs (Packed Red Blood Cells): 300 mL    sodium chloride 0.9%: 100 mL    Vasopressin: 97.5 mL  Total IN: 1726.1 mL    OUT:    Indwelling Catheter - Urethral (mL): 1285 mL  Total OUT: 1285 mL    Total NET: 441.1 mL    ============================ LABS =========================                        8.2    14.95 )-----------( 162      ( 28 Sep 2022 18:50 )             25.6         143  |  101  |  43<H>  ----------------------------<  195<H>  4.0   |  25  |  1.51<H>    Ca    9.4      28 Sep 2022 19:26  Phos  5.5       Mg     2.7         TPro  6.3  /  Alb  3.9  /  TBili  2.0<H>  /  DBili  x   /  AST  2742<H>  /  ALT  2666<H>  /  AlkPhos  117      LIVER FUNCTIONS - ( 28 Sep 2022 19:26 )  Alb: 3.9 g/dL / Pro: 6.3 g/dL / ALK PHOS: 117 U/L / ALT: 2666 U/L / AST: 2742 U/L / GGT: x           PT/INR - ( 28 Sep 2022 18:50 )   PT: 46.7 sec;   INR: 4.00 ratio         PTT - ( 28 Sep 2022 03:09 )  PTT:33.1 sec  ABG - ( 28 Sep 2022 18:06 )  pH, Arterial: 7.45  pH, Blood: x     /  pCO2: 38    /  pO2: 96    / HCO3: 26    / Base Excess: 2.3   /  SaO2: 98.6          Urinalysis Basic - ( 27 Sep 2022 13:57 )    Color: Yellow / Appearance: Clear / S.023 / pH: x  Gluc: x / Ketone: Negative  / Bili: Negative / Urobili: Negative   Blood: x / Protein: 30 mg/dL / Nitrite: Negative   Leuk Esterase: Large / RBC: 3 /hpf / WBC 67 /HPF   Sq Epi: x / Non Sq Epi: 2 /hpf / Bacteria: Negative    ======================Micro/Rad/Cardio=================  Culture: Reviewed   CXR: Reviewed  Echo: Reviewed  ======================================================  PAST MEDICAL & SURGICAL HISTORY:  Atrial fibrillation  paroxysmal, on eliquis    Diabetes  Type 2    COPD (chronic obstructive pulmonary disease)    Adrenal insufficiency  Medrol daily for over 50 years    Aortic insufficiency  moderate AR on echo 5/3/2018    Pelvic fracture    Asthma    Tracheobronchomalacia  diagnosed , s/p bronchial thermoplasty  (Dr Zapien); recent bronchoscopy 2018 revealed no evidence of tracheobronchomalacia in trachea or bronchial tubes    Colorectal cancer  2018- last treatment , chemo and radiation    Rectal bleeding    Seizure  x 1 18    DVT (deep venous thrombosis)  15-20 years ago, took coumadin    TIA (transient ischemic attack)  multiple, last 5 years ago - presents as right-sided weakness    History of partial hysterectomy  30 years ago - fibroids    H/O total knee replacement, bilateral  5 years ago    History of sinus surgery  multiple sinus surgeries    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)- course complicated by obstruction, respiratory failure, multiple CPR attempts -  stent discontinued; 10/20/2016 Tracheobronchoplasty (Prolene Mesh) performed at [facility] by Dr Zapien    S/P bronchoscopy  2018 - [place] (Dr Zapien) no evidence of tracheobronchomalacia in trachea or bronchial tubes    Rectal bleeding  exam under anesthesia ([facility]) 2018    ======================================================  Assessment:  71F PMH DM, COPD, Chronic Adrenal Insufficiency on Chronic prednisone, history of colorectal cancer s/p resection (colostomy bag), Hx of CAD, Chronic A fib on Eliquis, and tracheomalacia and multiple intubations, recent dx of OM presented to ED at [facility] this morning with epigastric pain, belching and central chest pain. Found on CTA to have type A aortic dissection and transferred to [facility] for surgical evaluation by Dr. Cabrera.     Ascending aortic dissection s/p modified bentall and hemiarch on    Hypovolemic shock   Post op respiratory insufficiency  Acute blood loss anemia  Stress hyperglycemia   Lactic Acidosis  RIJ thrombus  Pancreatic cyst    Proteus PNA  Leukocytosis  ======================================================  Plan:   ***Neuro***  [x] Sedated with [x] Precedex   Post operative neuro assessment     ***Cardiovascular***  : Patient returned to ICU for presumed sepsis, presenting with tachypnea, fevers, tachycardia  CT Chest on :  Status post recent ascending aortic dissection repair. Small amount of fluid surrounding the ascending aorta without evidence of enhancing wall to suggest abscess.  RUE duplex on : There is a thrombosed and occluded RIJ     Invasive hemodynamic monitoring, assess perfusion indices   SR/ CVP 13/ MAP 71/ Hct 25.6%/ Lactate 4.2  [x] Vasopressin - 0.04units.min [x] Levophed- 0.05mcgs [x] Dobutamine- 7.5mcgs   Continue Mexiletine for VT   [x] AC therapy with Eliquis held at this time   titrate pressors and inotropes when steady stability  ***Pulmonary***  CT Chest on : Bilateral lower lobe atelectasis with bilateral pleural effusions   Reintubated for change in mental status on , self extubated on  and reintubated again, extubated to HFO2 on  ReIntubated for airway protection (high peak/plateau pressures on volume control. Switched to Pressure control.) ARDS ?   Full vent support / Mar -20ppm   Hx of COPD / Continue bronchodilators   Monitor secretions and suction PRN     Mode: AC/ CMV (Assist Control/ Continuous Mandatory Ventilation)  RR (machine): 20  FiO2: 40  PEEP: 5  ITime: 0.9  MAP: 10  PC: 20  PIP: 22              ***GI***  CT A/P on : Mild thickening of the cecum and ascending colon possibly representing mild nonspecific proximal colitis. Hepatic cirrhosis. The focal IPMN of the pancreas with large cyst within the tail the pancreas measuring 3.1 cm.  Colostomy bag in place, continue to monitor output   Passed FEES on , CC diet with reg liquids, pt with decreased appetite need to encourage PO intake    [x] Tolerating tube feeds with Glucerna 1.5 Chago @40cc/hr   [x] Protonix   Simethicone PRN gas    ***Renal***  Continue to monitor I/Os, BUN/Creatinine.   Replete lytes PRN  Amezcua placed     ***ID***  SCx on  +Proteus mirabilis   UA on + LE, WBC 60, few yeast  /  UCx on 9/10 NG   BCx on 9/10 NGTD, BCx on  NGTD  bcx NGTD  Nystatin for thrush   Broad spectrum antibiotics (meropenem and vancomycin started for presumed sepsis)     ***Endocrine***  [x]  DM : HbA1c 9.4%                - [x] Insulin gtt              - Need tight glycemic control to prevent wound infection.  Endo following for recs now that patient has some PO intake with tube feed supplementation         Patient requires continuous monitoring with:  bedside rhythm monitoring, arterial line, pulse oximetry, ventilator management and monitoring; intermittent blood gas analysis.  Care plan discussed with ICU care team.  patient remain critical; required more than usual post op care; I have spent 30 minutes providing non routine post op care, revaluated multiple times during the day .     Care Plan discussed with the ICU care team. Patient remained critical, at risk for life threatening decompensation.     By signing my name below, I, Bharti Barrios, attest that this documentation has been prepared under the direction and in the presence of Jethro Nur MD.  Electronically signed: Georgi Gottlieb, 22 @ 20:56    I, Liudmila Morelos, personally performed the services described in this documentation. all medical record entries made by the scribe were at my direction and in my presence. I have reviewed the chart and agree that the record reflects my personal performance and is accurate and complete  Electronically signed: Jethro Nur MD.

## 2022-09-28 NOTE — PROGRESS NOTE ADULT - ASSESSMENT
71 f with DM, CAD, a-fib, asthma/COPD, Chronic Adrenal Insufficiency on steroids, history of colorectal cancer s/p resection and ostomy, tracheomalacia and multiple intubations, recent admission for sepsis, foot osteo and abscess s/p debridement and OR cx with MRSA, ESBL proteus and corynebacterium s/p 6 weeks of vanco and ertapenem which ended 8/17, now p/w chest pain, found to have  type A aortic dissection, s/p modified Bentall and hemiarch on 9/6/22.   Post op course complicated by shock, respiratory failure, anemia and hyperglycemia.   fever started 9/9, sputum cx with proteus mirabilis    fever post op for aortic dissection, sputum cx with proteus, pt was still febrile on zosyn but last wound cx that showed proteus was ESBL, switched to suraj and still persistently febrile and the proteus now is pan sensitive, chest /abd CT 9/12 with small fluid around the ascending aorta but no enhancing or abscess, b/l lower lobe atelectasis, ?mild colitis  doppler: thrombosed and occluded RIJ  adrenal insufficiency, was on prednisone, was given dexa 9/12 and 9/13 and no more fevers until 16th, extubated 9/13 so the persistent fevers could be in the setting of adrenal insufficiency as imaging and cx negative, pt was extubated and no focal symptoms, completed a course of suraj  blood and urine cx negative, ALT, AST increased today to 200s, no bili or ALK, unlikely due to suraj  penicillin and cefepime allergy in chart but pt has received cefepime and ceftriaxone before  new vomiting and respiratory distress, ?aspiration was intubated, increased WBC, shock, persistent hypotension, now shock liver, AST 3000, AST 7000, lactate: 8  * f/u the blood cx (negative for now)  * f/u sputum cx  * c/w suraj and vanco (renally dosed) for now but if no MRSA in cultures will discontinue vanco  *  chest/abd/pelvis CT if stable  The above assessment and plan was discussed with CTU    Michelle Rolon MD  contact on teams  After 5pm and on weekends call [phone]

## 2022-09-28 NOTE — PROGRESS NOTE ADULT - SUBJECTIVE AND OBJECTIVE BOX
CHIEF COMPLAINT: f/up sob, chronic resp failure, TBM, severe persistent asthma, VC dysfunction, Type A aortic dissection s/p repair w/modified "Bentall procedure and hemiarch replacement -vented and sedated on nitrous/100%    Interval Events: sepsis physiology-elev lactate/hypotension-pressors/inotropes/ABX    REVIEW OF SYSTEMS:  Constitutional: No fevers or chills. No weight loss. No fatigue or generalized malaise.  Eyes: No itching or discharge from the eyes  ENT: No ear pain. No ear discharge. No nasal congestion. No post nasal drip. No epistaxis. No throat pain. No sore throat. No difficulty swallowing.   CV: No chest pain. No palpitations. No lightheadedness or dizziness.   Resp: No dyspnea at rest. No dyspnea on exertion. No orthopnea. No wheezing. No cough. No stridor. No sputum production. No chest pain with respiration.  GI: No nausea. No vomiting. No diarrhea.  MSK: No joint pain or pain in any extremities  Integumentary: No skin lesions. No pedal edema.  Neurological: No gross motor weakness. No sensory changes.  [ ] All other systems negative  [+ ] Unable to assess ROS because _sedated_______    OBJECTIVE:  ICU Vital Signs Last 24 Hrs  T(C): 36.9 (28 Sep 2022 04:00), Max: 37.7 (27 Sep 2022 08:00)  T(F): 98.4 (28 Sep 2022 04:00), Max: 99.9 (27 Sep 2022 08:00)  HR: 101 (28 Sep 2022 04:45) (78 - 132)  BP: 99/74 (28 Sep 2022 00:45) (71/35 - 153/98)  BP(mean): 81 (28 Sep 2022 00:45) (26 - 97)  ABP: 125/53 (28 Sep 2022 04:45) (81/37 - 176/57)  ABP(mean): 68 (28 Sep 2022 04:45) (48 - 89)  RR: 30 (28 Sep 2022 04:45) (18 - 38)  SpO2: 99% (28 Sep 2022 04:45) (95% - 100%)    O2 Parameters below as of 28 Sep 2022 04:00  Patient On (Oxygen Delivery Method): ventilator    O2 Concentration (%): 80      Mode: AC/ CMV (Assist Control/ Continuous Mandatory Ventilation), RR (machine): 30, TV (machine): 400, FiO2: 80, PEEP: 5, ITime: 0.9, MAP: 19, PC: 30, PIP: 35     @ 07: @ 07:00  --------------------------------------------------------  IN: 973 mL / OUT: 875 mL / NET: 98 mL     @ 07: @ 04:59  --------------------------------------------------------  IN: 4876.5 mL / OUT: 2835 mL / NET: 2041.5 mL      CAPILLARY BLOOD GLUCOSE      POCT Blood Glucose.: 175 mg/dL (28 Sep 2022 04:47)      PHYSICAL EXAM:  General: vented sedated  HEENT: Atraumatic, normocephalic.                 Mallampatti Grade 3                 No nasal congestion.                No tonsillar or pharyngeal exudates.  Lymph Nodes: No palpable lymphadenopathy  Neck: No JVD. No carotid bruit.   Respiratory: Normal chest expansion                         Normal percussion                         Normal and equal air entry                         No wheeze, rhonchi or rales.  Cardiovascular: S1 S2 normal. + murmurs, rubs or gallops.   Abdomen: Soft, non-tender, non-distended. No organomegaly. Normoactive bowel sounds.  Extremities: Warm to touch. Peripheral pulse palpable. No pedal edema.   Skin: No rashes or skin lesions  Neurological: Motor and sensory examination equal and normal in all four extremities.  Psychiatry: unable    HOSPITAL MEDICATIONS:  MEDICATIONS  (STANDING):  ALBUTerol    90 MICROgram(s) HFA Inhaler 1 Puff(s) Inhalation every 4 hours  albuterol/ipratropium for Nebulization 3 milliLiter(s) Nebulizer every 6 hours  albuterol/ipratropium for Nebulization 3 milliLiter(s) Nebulizer every 6 hours  buDESOnide    Inhalation Suspension 0.5 milliGRAM(s) Inhalation every 12 hours  chlorhexidine 2% Cloths 1 Application(s) Topical <User Schedule>  collagenase Ointment 1 Application(s) Topical daily  dextrose 50% Injectable 50 milliLiter(s) IV Push every 15 minutes  DOBUTamine Infusion 10 MICROgram(s)/kG/Min (20.3 mL/Hr) IV Continuous <Continuous>  hydrocortisone sodium succinate Injectable 100 milliGRAM(s) IV Push every 8 hours  insulin regular Infusion 3 Unit(s)/Hr (3 mL/Hr) IV Continuous <Continuous>  meropenem  IVPB 1000 milliGRAM(s) IV Intermittent every 12 hours  meropenem  IVPB      mexiletine 200 milliGRAM(s) Oral every 8 hours  montelukast 10 milliGRAM(s) Oral at bedtime  multivitamin 1 Tablet(s) Oral daily  norepinephrine Infusion 0.05 MICROgram(s)/kG/Min (6.35 mL/Hr) IV Continuous <Continuous>  nystatin    Suspension [id number] Unit(s) Oral every 6 hours  pantoprazole  Injectable 40 milliGRAM(s) IV Push daily  sodium chloride 0.9%. 1000 milliLiter(s) (50 mL/Hr) IV Continuous <Continuous>  vancomycin  IVPB 1000 milliGRAM(s) IV Intermittent every 24 hours  vasopressin Infusion 0.04 Unit(s)/Min (6 mL/Hr) IV Continuous <Continuous>    MEDICATIONS  (PRN):  acetaminophen    Suspension .. 675 milliGRAM(s) Oral every 6 hours PRN Moderate Pain (4 - 6)  albuterol/ipratropium for Nebulization 3 milliLiter(s) Nebulizer every 12 hours PRN Shortness of Breath and/or Wheezing  simethicone drops 80 milliGRAM(s) Oral every 12 hours PRN Gas      LABS:                        9.0    9.03  )-----------( 184      ( 28 Sep 2022 04:39 )             29.1         143  |  100  |  41<H>  ----------------------------<  246<H>  4.5   |  20<L>  |  1.47<H>    Ca    9.9      28 Sep 2022 00:53  Phos  5.6       Mg     2.6         TPro  6.7  /  Alb  4.2  /  TBili  2.0<H>  /  DBili  x   /  AST  7849<H>  /  ALT  3625<H>  /  AlkPhos  106      PT/INR - ( 28 Sep 2022 03:09 )   PT: 43.6 sec;   INR: 3.71 ratio         PTT - ( 28 Sep 2022 03:09 )  PTT:33.1 sec  Urinalysis Basic - ( 27 Sep 2022 13:57 )    Color: Yellow / Appearance: Clear / S.023 / pH: x  Gluc: x / Ketone: Negative  / Bili: Negative / Urobili: Negative   Blood: x / Protein: 30 mg/dL / Nitrite: Negative   Leuk Esterase: Large / RBC: 3 /hpf / WBC 67 /HPF   Sq Epi: x / Non Sq Epi: 2 /hpf / Bacteria: Negative      Arterial Blood Gas:   @ 03:00  7.45/31/104/22/98.4/-1.9  ABG lactate: --  Arterial Blood Gas:   @ 01:40  7.44/33/221/22/99.2/-1.3  ABG lactate: --  Arterial Blood Gas:   @ 00:45  7.45/31/193/22/98.8/-2.0  ABG lactate: --  Arterial Blood Gas:   @ 23:23  7.38/36/182/21/98.5/-3.5  ABG lactate: --  Arterial Blood Gas:   @ 22:30  7.43/33/85/22/97.3/-2.1  ABG lactate: --  Arterial Blood Gas:   @ 21:30  7.32/35/81/18/96.7/-7.4  ABG lactate: --  Arterial Blood Gas:   @ 19:00  7.31/48/106/24/98.0/-2.1  ABG lactate: --  Arterial Blood Gas:   @ 17:20  7.22/63/137/26/98.0/-2.3  ABG lactate: --  Arterial Blood Gas:   @ 15:14  7.24/60/153/26/98.3/-2.0  ABG lactate: --  Arterial Blood Gas:   @ 14:15  7.15/65/158/23/98.3/-6.4  ABG lactate: --  Arterial Blood Gas:   @ 13:15  7.16/59/128/21/98.2/-7.7  ABG lactate: --  Arterial Blood Gas:   @ 10:11  7.28/48/118/23/98.0/-4.2  ABG lactate: --  Arterial Blood Gas:   @ 09:01  7.48/33/59/25/90.1/1.5  ABG lactate: --  Arterial Blood Gas:   @ 07:44  7.51/30/63/24/92.1/1.4  ABG lactate: --  Arterial Blood Gas:   @ 07:05  7.50/30/65/23/92.8/0.9  ABG lactate: --  Arterial Blood Gas:   @ 05:56  7.46/31/83/22/96.1/-1.1  ABG lactate: --    Venous Blood Gas:   @ 01:40  7.36/43/44/24/67.6  VBG Lactate: 8.1  Venous Blood Gas:   @ 00:45  7.34/44/46/24/68.8  VBG Lactate: --  Venous Blood Gas:   @ 23:01  7.28/52/40/24/53.3  VBG Lactate: --  Venous Blood Gas:   @ 15:20  7.19/77/51/29/73.5  VBG Lactate: 3.6      MICROBIOLOGY:     RADIOLOGY:  [ ] Reviewed and interpreted by me    Point of Care Ultrasound Findings:    PFT:    EKG:

## 2022-09-29 ENCOUNTER — APPOINTMENT (OUTPATIENT)
Dept: PULMONOLOGY | Facility: CLINIC | Age: 72
End: 2022-09-29

## 2022-09-29 LAB
ALBUMIN SERPL ELPH-MCNC: 3.6 G/DL — SIGNIFICANT CHANGE UP (ref 3.3–5)
ALBUMIN SERPL ELPH-MCNC: 3.7 G/DL — SIGNIFICANT CHANGE UP (ref 3.3–5)
ALP SERPL-CCNC: 122 U/L — HIGH (ref 40–120)
ALP SERPL-CCNC: 143 U/L — HIGH (ref 40–120)
ALT FLD-CCNC: 1939 U/L — HIGH (ref 10–45)
ALT FLD-CCNC: 2507 U/L — HIGH (ref 10–45)
ANION GAP SERPL CALC-SCNC: 15 MMOL/L — SIGNIFICANT CHANGE UP (ref 5–17)
ANION GAP SERPL CALC-SCNC: 16 MMOL/L — SIGNIFICANT CHANGE UP (ref 5–17)
APTT BLD: 33.9 SEC — SIGNIFICANT CHANGE UP (ref 27.5–35.5)
AST SERPL-CCNC: 2253 U/L — HIGH (ref 10–40)
AST SERPL-CCNC: 981 U/L — HIGH (ref 10–40)
BASE EXCESS BLDV CALC-SCNC: -1.4 MMOL/L — SIGNIFICANT CHANGE UP (ref -2–3)
BASE EXCESS BLDV CALC-SCNC: 1.3 MMOL/L — SIGNIFICANT CHANGE UP (ref -2–3)
BASE EXCESS BLDV CALC-SCNC: 2.6 MMOL/L — SIGNIFICANT CHANGE UP (ref -2–3)
BILIRUB SERPL-MCNC: 1.9 MG/DL — HIGH (ref 0.2–1.2)
BILIRUB SERPL-MCNC: 2.1 MG/DL — HIGH (ref 0.2–1.2)
BUN SERPL-MCNC: 46 MG/DL — HIGH (ref 7–23)
BUN SERPL-MCNC: 53 MG/DL — HIGH (ref 7–23)
CA-I SERPL-SCNC: 1.14 MMOL/L — LOW (ref 1.15–1.33)
CA-I SERPL-SCNC: 1.19 MMOL/L — SIGNIFICANT CHANGE UP (ref 1.15–1.33)
CA-I SERPL-SCNC: 1.21 MMOL/L — SIGNIFICANT CHANGE UP (ref 1.15–1.33)
CALCIUM SERPL-MCNC: 9.7 MG/DL — SIGNIFICANT CHANGE UP (ref 8.4–10.5)
CALCIUM SERPL-MCNC: 9.8 MG/DL — SIGNIFICANT CHANGE UP (ref 8.4–10.5)
CHLORIDE BLDV-SCNC: 107 MMOL/L — SIGNIFICANT CHANGE UP (ref 96–108)
CHLORIDE BLDV-SCNC: 110 MMOL/L — HIGH (ref 96–108)
CHLORIDE BLDV-SCNC: 114 MMOL/L — HIGH (ref 96–108)
CHLORIDE SERPL-SCNC: 104 MMOL/L — SIGNIFICANT CHANGE UP (ref 96–108)
CHLORIDE SERPL-SCNC: 108 MMOL/L — SIGNIFICANT CHANGE UP (ref 96–108)
CO2 BLDV-SCNC: 23 MMOL/L — SIGNIFICANT CHANGE UP (ref 22–26)
CO2 BLDV-SCNC: 27 MMOL/L — HIGH (ref 22–26)
CO2 BLDV-SCNC: 27 MMOL/L — HIGH (ref 22–26)
CO2 SERPL-SCNC: 22 MMOL/L — SIGNIFICANT CHANGE UP (ref 22–31)
CO2 SERPL-SCNC: 25 MMOL/L — SIGNIFICANT CHANGE UP (ref 22–31)
CREAT SERPL-MCNC: 1.21 MG/DL — SIGNIFICANT CHANGE UP (ref 0.5–1.3)
CREAT SERPL-MCNC: 1.46 MG/DL — HIGH (ref 0.5–1.3)
EGFR: 38 ML/MIN/1.73M2 — LOW
EGFR: 47 ML/MIN/1.73M2 — LOW
FIBRINOGEN PPP-MCNC: 585 MG/DL — HIGH (ref 330–520)
GAS PNL BLDA: SIGNIFICANT CHANGE UP
GAS PNL BLDV: 141 MMOL/L — SIGNIFICANT CHANGE UP (ref 136–145)
GAS PNL BLDV: 143 MMOL/L — SIGNIFICANT CHANGE UP (ref 136–145)
GAS PNL BLDV: 144 MMOL/L — SIGNIFICANT CHANGE UP (ref 136–145)
GAS PNL BLDV: SIGNIFICANT CHANGE UP
GLUCOSE BLDC GLUCOMTR-MCNC: 103 MG/DL — HIGH (ref 70–99)
GLUCOSE BLDC GLUCOMTR-MCNC: 104 MG/DL — HIGH (ref 70–99)
GLUCOSE BLDC GLUCOMTR-MCNC: 111 MG/DL — HIGH (ref 70–99)
GLUCOSE BLDC GLUCOMTR-MCNC: 112 MG/DL — HIGH (ref 70–99)
GLUCOSE BLDC GLUCOMTR-MCNC: 115 MG/DL — HIGH (ref 70–99)
GLUCOSE BLDC GLUCOMTR-MCNC: 119 MG/DL — HIGH (ref 70–99)
GLUCOSE BLDC GLUCOMTR-MCNC: 124 MG/DL — HIGH (ref 70–99)
GLUCOSE BLDC GLUCOMTR-MCNC: 127 MG/DL — HIGH (ref 70–99)
GLUCOSE BLDC GLUCOMTR-MCNC: 130 MG/DL — HIGH (ref 70–99)
GLUCOSE BLDC GLUCOMTR-MCNC: 134 MG/DL — HIGH (ref 70–99)
GLUCOSE BLDC GLUCOMTR-MCNC: 136 MG/DL — HIGH (ref 70–99)
GLUCOSE BLDC GLUCOMTR-MCNC: 136 MG/DL — HIGH (ref 70–99)
GLUCOSE BLDC GLUCOMTR-MCNC: 140 MG/DL — HIGH (ref 70–99)
GLUCOSE BLDC GLUCOMTR-MCNC: 141 MG/DL — HIGH (ref 70–99)
GLUCOSE BLDC GLUCOMTR-MCNC: 161 MG/DL — HIGH (ref 70–99)
GLUCOSE BLDC GLUCOMTR-MCNC: 162 MG/DL — HIGH (ref 70–99)
GLUCOSE BLDC GLUCOMTR-MCNC: 171 MG/DL — HIGH (ref 70–99)
GLUCOSE BLDC GLUCOMTR-MCNC: 181 MG/DL — HIGH (ref 70–99)
GLUCOSE BLDC GLUCOMTR-MCNC: 209 MG/DL — HIGH (ref 70–99)
GLUCOSE BLDC GLUCOMTR-MCNC: 91 MG/DL — SIGNIFICANT CHANGE UP (ref 70–99)
GLUCOSE BLDC GLUCOMTR-MCNC: 95 MG/DL — SIGNIFICANT CHANGE UP (ref 70–99)
GLUCOSE BLDC GLUCOMTR-MCNC: 96 MG/DL — SIGNIFICANT CHANGE UP (ref 70–99)
GLUCOSE BLDC GLUCOMTR-MCNC: 97 MG/DL — SIGNIFICANT CHANGE UP (ref 70–99)
GLUCOSE BLDV-MCNC: 133 MG/DL — HIGH (ref 70–99)
GLUCOSE BLDV-MCNC: 180 MG/DL — HIGH (ref 70–99)
GLUCOSE BLDV-MCNC: 93 MG/DL — SIGNIFICANT CHANGE UP (ref 70–99)
GLUCOSE SERPL-MCNC: 178 MG/DL — HIGH (ref 70–99)
GLUCOSE SERPL-MCNC: 183 MG/DL — HIGH (ref 70–99)
HCO3 BLDV-SCNC: 22 MMOL/L — SIGNIFICANT CHANGE UP (ref 22–29)
HCO3 BLDV-SCNC: 26 MMOL/L — SIGNIFICANT CHANGE UP (ref 22–29)
HCO3 BLDV-SCNC: 26 MMOL/L — SIGNIFICANT CHANGE UP (ref 22–29)
HCT VFR BLD CALC: 27.5 % — LOW (ref 34.5–45)
HCT VFR BLDA CALC: 26 % — LOW (ref 34.5–46.5)
HCT VFR BLDA CALC: 26 % — LOW (ref 34.5–46.5)
HCT VFR BLDA CALC: 27 % — LOW (ref 34.5–46.5)
HGB BLD CALC-MCNC: 8.6 G/DL — LOW (ref 11.7–16.1)
HGB BLD CALC-MCNC: 8.8 G/DL — LOW (ref 11.7–16.1)
HGB BLD CALC-MCNC: 8.9 G/DL — LOW (ref 11.7–16.1)
HGB BLD-MCNC: 9.1 G/DL — LOW (ref 11.5–15.5)
HOROWITZ INDEX BLDV+IHG-RTO: 40 — SIGNIFICANT CHANGE UP
HOROWITZ INDEX BLDV+IHG-RTO: 50 — SIGNIFICANT CHANGE UP
HOROWITZ INDEX BLDV+IHG-RTO: 50 — SIGNIFICANT CHANGE UP
INR BLD: 1.87 RATIO — HIGH (ref 0.88–1.16)
INR BLD: 3.82 RATIO — HIGH (ref 0.88–1.16)
LACTATE BLDV-MCNC: 2.2 MMOL/L — HIGH (ref 0.5–2)
LACTATE BLDV-MCNC: 2.5 MMOL/L — HIGH (ref 0.5–2)
LACTATE BLDV-MCNC: 4.5 MMOL/L — CRITICAL HIGH (ref 0.5–2)
MAGNESIUM SERPL-MCNC: 2.4 MG/DL — SIGNIFICANT CHANGE UP (ref 1.6–2.6)
MCHC RBC-ENTMCNC: 25.4 PG — LOW (ref 27–34)
MCHC RBC-ENTMCNC: 33.1 GM/DL — SIGNIFICANT CHANGE UP (ref 32–36)
MCV RBC AUTO: 76.8 FL — LOW (ref 80–100)
NRBC # BLD: 2 /100 WBCS — HIGH (ref 0–0)
PCO2 BLDV: 31 MMHG — LOW (ref 39–42)
PCO2 BLDV: 35 MMHG — LOW (ref 39–42)
PCO2 BLDV: 40 MMHG — SIGNIFICANT CHANGE UP (ref 39–42)
PH BLDV: 7.42 — SIGNIFICANT CHANGE UP (ref 7.32–7.43)
PH BLDV: 7.46 — HIGH (ref 7.32–7.43)
PH BLDV: 7.48 — HIGH (ref 7.32–7.43)
PHOSPHATE SERPL-MCNC: 4.8 MG/DL — HIGH (ref 2.5–4.5)
PLATELET # BLD AUTO: 151 K/UL — SIGNIFICANT CHANGE UP (ref 150–400)
PO2 BLDV: 52 MMHG — HIGH (ref 25–45)
PO2 BLDV: 53 MMHG — HIGH (ref 25–45)
PO2 BLDV: 59 MMHG — HIGH (ref 25–45)
POTASSIUM BLDV-SCNC: 3.3 MMOL/L — LOW (ref 3.5–5.1)
POTASSIUM BLDV-SCNC: 3.4 MMOL/L — LOW (ref 3.5–5.1)
POTASSIUM BLDV-SCNC: 3.7 MMOL/L — SIGNIFICANT CHANGE UP (ref 3.5–5.1)
POTASSIUM SERPL-MCNC: 3.7 MMOL/L — SIGNIFICANT CHANGE UP (ref 3.5–5.3)
POTASSIUM SERPL-MCNC: 4 MMOL/L — SIGNIFICANT CHANGE UP (ref 3.5–5.3)
POTASSIUM SERPL-SCNC: 3.7 MMOL/L — SIGNIFICANT CHANGE UP (ref 3.5–5.3)
POTASSIUM SERPL-SCNC: 4 MMOL/L — SIGNIFICANT CHANGE UP (ref 3.5–5.3)
PROT SERPL-MCNC: 6.4 G/DL — SIGNIFICANT CHANGE UP (ref 6–8.3)
PROT SERPL-MCNC: 6.6 G/DL — SIGNIFICANT CHANGE UP (ref 6–8.3)
PROTHROM AB SERPL-ACNC: 21.8 SEC — HIGH (ref 10.5–13.4)
PROTHROM AB SERPL-ACNC: 44.5 SEC — HIGH (ref 10.5–13.4)
RBC # BLD: 3.58 M/UL — LOW (ref 3.8–5.2)
RBC # FLD: 23.8 % — HIGH (ref 10.3–14.5)
SAO2 % BLDV: 80.5 % — SIGNIFICANT CHANGE UP (ref 67–88)
SAO2 % BLDV: 86.7 % — SIGNIFICANT CHANGE UP (ref 67–88)
SAO2 % BLDV: 90.1 % — HIGH (ref 67–88)
SODIUM SERPL-SCNC: 145 MMOL/L — SIGNIFICANT CHANGE UP (ref 135–145)
SODIUM SERPL-SCNC: 145 MMOL/L — SIGNIFICANT CHANGE UP (ref 135–145)
VANCOMYCIN TROUGH SERPL-MCNC: 16.8 UG/ML — SIGNIFICANT CHANGE UP (ref 10–20)
VANCOMYCIN TROUGH SERPL-MCNC: 20.9 UG/ML — HIGH (ref 10–20)
WBC # BLD: 15.28 K/UL — HIGH (ref 3.8–10.5)
WBC # FLD AUTO: 15.28 K/UL — HIGH (ref 3.8–10.5)

## 2022-09-29 PROCEDURE — 99232 SBSQ HOSP IP/OBS MODERATE 35: CPT

## 2022-09-29 PROCEDURE — 93010 ELECTROCARDIOGRAM REPORT: CPT

## 2022-09-29 PROCEDURE — 71045 X-RAY EXAM CHEST 1 VIEW: CPT | Mod: 26,77

## 2022-09-29 PROCEDURE — 99291 CRITICAL CARE FIRST HOUR: CPT | Mod: 25

## 2022-09-29 PROCEDURE — 99292 CRITICAL CARE ADDL 30 MIN: CPT | Mod: 25

## 2022-09-29 PROCEDURE — 71045 X-RAY EXAM CHEST 1 VIEW: CPT | Mod: 26

## 2022-09-29 PROCEDURE — 31624 DX BRONCHOSCOPE/LAVAGE: CPT | Mod: 52,58

## 2022-09-29 RX ORDER — POTASSIUM CHLORIDE 20 MEQ
10 PACKET (EA) ORAL
Refills: 0 | Status: COMPLETED | OUTPATIENT
Start: 2022-09-29 | End: 2022-09-29

## 2022-09-29 RX ORDER — HYDROCORTISONE 20 MG
50 TABLET ORAL EVERY 8 HOURS
Refills: 0 | Status: DISCONTINUED | OUTPATIENT
Start: 2022-09-29 | End: 2022-09-30

## 2022-09-29 RX ORDER — NICARDIPINE HYDROCHLORIDE 30 MG/1
5 CAPSULE, EXTENDED RELEASE ORAL
Qty: 40 | Refills: 0 | Status: DISCONTINUED | OUTPATIENT
Start: 2022-09-29 | End: 2022-10-01

## 2022-09-29 RX ORDER — CALCIUM GLUCONATE 100 MG/ML
1 VIAL (ML) INTRAVENOUS ONCE
Refills: 0 | Status: COMPLETED | OUTPATIENT
Start: 2022-09-29 | End: 2022-09-29

## 2022-09-29 RX ADMIN — MEROPENEM 100 MILLIGRAM(S): 1 INJECTION INTRAVENOUS at 06:22

## 2022-09-29 RX ADMIN — Medication 1 TABLET(S): at 13:04

## 2022-09-29 RX ADMIN — Medication 10.2 MICROGRAM(S)/KG/MIN: at 22:02

## 2022-09-29 RX ADMIN — MEXILETINE HYDROCHLORIDE 200 MILLIGRAM(S): 150 CAPSULE ORAL at 22:04

## 2022-09-29 RX ADMIN — Medication 3 MILLILITER(S): at 23:30

## 2022-09-29 RX ADMIN — Medication 0.5 MILLIGRAM(S): at 06:32

## 2022-09-29 RX ADMIN — Medication 500000 UNIT(S): at 23:08

## 2022-09-29 RX ADMIN — Medication 50 MILLIEQUIVALENT(S): at 12:00

## 2022-09-29 RX ADMIN — Medication 50 MILLIEQUIVALENT(S): at 01:15

## 2022-09-29 RX ADMIN — VASOPRESSIN 6 UNIT(S)/MIN: 20 INJECTION INTRAVENOUS at 10:10

## 2022-09-29 RX ADMIN — Medication 50 MILLIEQUIVALENT(S): at 13:04

## 2022-09-29 RX ADMIN — PANTOPRAZOLE SODIUM 40 MILLIGRAM(S): 20 TABLET, DELAYED RELEASE ORAL at 13:05

## 2022-09-29 RX ADMIN — Medication 250 MILLIGRAM(S): at 13:05

## 2022-09-29 RX ADMIN — Medication 50 MILLIEQUIVALENT(S): at 01:45

## 2022-09-29 RX ADMIN — MEROPENEM 100 MILLIGRAM(S): 1 INJECTION INTRAVENOUS at 18:00

## 2022-09-29 RX ADMIN — Medication 3 MILLILITER(S): at 11:44

## 2022-09-29 RX ADMIN — Medication 0.5 MILLIGRAM(S): at 17:37

## 2022-09-29 RX ADMIN — INSULIN HUMAN 3 UNIT(S)/HR: 100 INJECTION, SOLUTION SUBCUTANEOUS at 10:10

## 2022-09-29 RX ADMIN — Medication 500000 UNIT(S): at 13:04

## 2022-09-29 RX ADMIN — Medication 50 MILLIGRAM(S): at 13:05

## 2022-09-29 RX ADMIN — INSULIN HUMAN 3 UNIT(S)/HR: 100 INJECTION, SOLUTION SUBCUTANEOUS at 22:03

## 2022-09-29 RX ADMIN — NICARDIPINE HYDROCHLORIDE 25 MG/HR: 30 CAPSULE, EXTENDED RELEASE ORAL at 22:03

## 2022-09-29 RX ADMIN — Medication 3 MILLILITER(S): at 17:37

## 2022-09-29 RX ADMIN — Medication 100 MILLIGRAM(S): at 05:00

## 2022-09-29 RX ADMIN — Medication 50 MILLIGRAM(S): at 22:03

## 2022-09-29 RX ADMIN — Medication 10.2 MICROGRAM(S)/KG/MIN: at 10:09

## 2022-09-29 RX ADMIN — Medication 50 MILLIEQUIVALENT(S): at 11:00

## 2022-09-29 RX ADMIN — Medication 1 APPLICATION(S): at 13:36

## 2022-09-29 RX ADMIN — Medication 50 MILLIEQUIVALENT(S): at 05:20

## 2022-09-29 RX ADMIN — Medication 3 MILLILITER(S): at 06:32

## 2022-09-29 RX ADMIN — MEXILETINE HYDROCHLORIDE 200 MILLIGRAM(S): 150 CAPSULE ORAL at 13:04

## 2022-09-29 RX ADMIN — MEXILETINE HYDROCHLORIDE 200 MILLIGRAM(S): 150 CAPSULE ORAL at 05:00

## 2022-09-29 RX ADMIN — CHLORHEXIDINE GLUCONATE 1 APPLICATION(S): 213 SOLUTION TOPICAL at 06:13

## 2022-09-29 RX ADMIN — MONTELUKAST 10 MILLIGRAM(S): 4 TABLET, CHEWABLE ORAL at 22:05

## 2022-09-29 RX ADMIN — Medication 500000 UNIT(S): at 01:04

## 2022-09-29 RX ADMIN — Medication 500000 UNIT(S): at 05:00

## 2022-09-29 RX ADMIN — Medication 100 GRAM(S): at 10:09

## 2022-09-29 RX ADMIN — Medication 500000 UNIT(S): at 18:00

## 2022-09-29 RX ADMIN — Medication 50 MILLIEQUIVALENT(S): at 06:00

## 2022-09-29 NOTE — PROGRESS NOTE ADULT - SUBJECTIVE AND OBJECTIVE BOX
seen earlier today     Chief Complaint: Type 2 Diabetes Mellitus     INTERVAL HX: on insulin gtt requiring 0-6 units, receiving HCT 100mg q8h       Review of Systems:    Allergies    aspirin (Short breath)  Avelox (Short breath; Pruritus)  cefepime (Anaphylaxis)  codeine (Short breath)  Dilaudid (Short breath)  iodine (Short breath; Swelling)  penicillin (Anaphylaxis)  shellfish (Anaphylaxis)  tetanus toxoid (Short breath)  Valium (Short breath)    Intolerances      MEDICATIONS  (STANDING):  albuterol/ipratropium for Nebulization 3 milliLiter(s) Nebulizer every 6 hours  buDESOnide    Inhalation Suspension 0.5 milliGRAM(s) Inhalation every 12 hours  chlorhexidine 2% Cloths 1 Application(s) Topical <User Schedule>  collagenase Ointment 1 Application(s) Topical daily  DOBUTamine Infusion 5 MICROgram(s)/kG/Min (10.2 mL/Hr) IV Continuous <Continuous>  hydrocortisone sodium succinate Injectable 100 milliGRAM(s) IV Push every 8 hours  insulin regular Infusion 3 Unit(s)/Hr (3 mL/Hr) IV Continuous <Continuous>  meropenem  IVPB 1000 milliGRAM(s) IV Intermittent every 12 hours  meropenem  IVPB      mexiletine 200 milliGRAM(s) Oral every 8 hours  montelukast 10 milliGRAM(s) Oral at bedtime  multivitamin 1 Tablet(s) Oral daily  nystatin    Suspension 275534 Unit(s) Oral every 6 hours  pantoprazole  Injectable 40 milliGRAM(s) IV Push daily  potassium chloride  10 mEq/50 mL IVPB 10 milliEquivalent(s) IV Intermittent every 1 hour  sodium chloride 0.9%. 1000 milliLiter(s) (50 mL/Hr) IV Continuous <Continuous>  vancomycin  IVPB 1000 milliGRAM(s) IV Intermittent every 24 hours  vasopressin Infusion 0.04 Unit(s)/Min (6 mL/Hr) IV Continuous <Continuous>        hydrocortisone sodium succinate Injectable   100 milliGRAM(s) IV Push (09-29-22 @ 05:00)   100 milliGRAM(s) IV Push (09-28-22 @ 22:41)   100 milliGRAM(s) IV Push (09-28-22 @ 13:23)    insulin regular Infusion   3 mL/Hr IV Continuous (09-28-22 @ 22:47)    vasopressin Infusion   6 mL/Hr IV Continuous (09-28-22 @ 22:46)        PHYSICAL EXAM:  VITALS: T(C): 36.7 (09-29-22 @ 08:00)  T(F): 98.1 (09-29-22 @ 08:00), Max: 98.8 (09-28-22 @ 12:00)  HR: 92 (09-29-22 @ 10:00) (80 - 101)  BP: 137/64 (09-29-22 @ 06:30) (95/46 - 153/67)  RR:  (20 - 28)  SpO2:  (94% - 100%)  Wt(kg): --  GENERAL: in NAD  Respiratory: Respirations unlabored   Extremities: Warm, no edema  NEURO: Alert , appropriate       LABS:    POCT Blood Glucose.: 103 mg/dL (09-29-22 @ 10:07)  POCT Blood Glucose.: 95 mg/dL (09-29-22 @ 09:40)  POCT Blood Glucose.: 96 mg/dL (09-29-22 @ 09:02)  POCT Blood Glucose.: 104 mg/dL (09-29-22 @ 08:09)  POCT Blood Glucose.: 124 mg/dL (09-29-22 @ 06:45)  POCT Blood Glucose.: 127 mg/dL (09-29-22 @ 05:49)  POCT Blood Glucose.: 134 mg/dL (09-29-22 @ 04:48)  POCT Blood Glucose.: 130 mg/dL (09-29-22 @ 03:53)  POCT Blood Glucose.: 140 mg/dL (09-29-22 @ 02:59)  POCT Blood Glucose.: 162 mg/dL (09-29-22 @ 01:53)  POCT Blood Glucose.: 171 mg/dL (09-29-22 @ 00:56)  POCT Blood Glucose.: 177 mg/dL (09-28-22 @ 23:54)  POCT Blood Glucose.: 171 mg/dL (09-28-22 @ 22:48)  POCT Blood Glucose.: 171 mg/dL (09-28-22 @ 21:57)  POCT Blood Glucose.: 204 mg/dL (09-28-22 @ 21:11)  POCT Blood Glucose.: 191 mg/dL (09-28-22 @ 19:59)  POCT Blood Glucose.: 191 mg/dL (09-28-22 @ 19:21)  POCT Blood Glucose.: 189 mg/dL (09-28-22 @ 17:23)  POCT Blood Glucose.: 100 mg/dL (09-28-22 @ 13:05)  POCT Blood Glucose.: 118 mg/dL (09-28-22 @ 11:50)  POCT Blood Glucose.: 125 mg/dL (09-28-22 @ 11:23)  POCT Blood Glucose.: 153 mg/dL (09-28-22 @ 08:54)  POCT Blood Glucose.: 157 mg/dL (09-28-22 @ 07:39)  POCT Blood Glucose.: 156 mg/dL (09-28-22 @ 06:42)  POCT Blood Glucose.: 181 mg/dL (09-28-22 @ 06:10)  POCT Blood Glucose.: 178 mg/dL (09-28-22 @ 05:23)  POCT Blood Glucose.: 175 mg/dL (09-28-22 @ 04:47)  POCT Blood Glucose.: 200 mg/dL (09-28-22 @ 04:05)  POCT Blood Glucose.: 233 mg/dL (09-28-22 @ 02:57)  POCT Blood Glucose.: 253 mg/dL (09-28-22 @ 01:42)  POCT Blood Glucose.: 235 mg/dL (09-27-22 @ 23:52)  POCT Blood Glucose.: 252 mg/dL (09-27-22 @ 23:06)  POCT Blood Glucose.: 90 mg/dL (09-27-22 @ 22:06)  POCT Blood Glucose.: 108 mg/dL (09-27-22 @ 21:08)  POCT Blood Glucose.: 141 mg/dL (09-27-22 @ 20:03)  POCT Blood Glucose.: 197 mg/dL (09-27-22 @ 18:03)  POCT Blood Glucose.: 99 mg/dL (09-27-22 @ 17:03)  POCT Blood Glucose.: 98 mg/dL (09-27-22 @ 15:11)  POCT Blood Glucose.: 113 mg/dL (09-27-22 @ 14:09)  POCT Blood Glucose.: 173 mg/dL (09-27-22 @ 13:09)  POCT Blood Glucose.: 271 mg/dL (09-27-22 @ 11:48)  POCT Blood Glucose.: 350 mg/dL (09-27-22 @ 05:46)  POCT Blood Glucose.: 241 mg/dL (09-27-22 @ 02:07)  POCT Blood Glucose.: 132 mg/dL (09-26-22 @ 22:31)  POCT Blood Glucose.: 167 mg/dL (09-26-22 @ 19:49)  POCT Blood Glucose.: 244 mg/dL (09-26-22 @ 16:10)  POCT Blood Glucose.: 339 mg/dL (09-26-22 @ 11:20)                            9.1    15.28 )-----------( 151      ( 29 Sep 2022 00:14 )             27.5       09-29    145  |  104  |  46<H>  ----------------------------<  178<H>  3.7   |  25  |  1.46<H>    Ca    9.8      29 Sep 2022 00:14  Phos  4.8     09-29  Mg     2.4     09-29    TPro  6.4  /  Alb  3.7  /  TBili  1.9<H>  /  DBili  x   /  AST  2253<H>  /  ALT  2507<H>  /  AlkPhos  122<H>  09-29      eGFR: 38 mL/min/1.73m2 (29 Sep 2022 00:14)  eGFR: 36 mL/min/1.73m2 (28 Sep 2022 19:26)          Thyroid Function Tests:  09-06 @ 16:46 TSH 3.30 FreeT4 -- T3 -- Anti TPO -- Anti Thyroglobulin Ab -- TSI --          A1C with Estimated Average Glucose Result: 9.4 % (09-06-22 @ 16:46)  A1C with Estimated Average Glucose Result: 9.2 % (07-11-22 @ 07:36)      Estimated Average Glucose: 223 mg/dL (09-06-22 @ 16:46)  Estimated Average Glucose: 217 mg/dL (07-11-22 @ 07:36)                          seen earlier today     Chief Complaint: Type 2 Diabetes Mellitus     INTERVAL HX: on insulin gtt requiring 0-6 units,on/off gtt depending on BG , off since 8 am, off pressors at time of visit    steroid tapered to HCT 50mg q8h  , intubated. NPO at time of visit    Review of Systems:  unable to complete pt intubated     Allergies    aspirin (Short breath)  Avelox (Short breath; Pruritus)  cefepime (Anaphylaxis)  codeine (Short breath)  Dilaudid (Short breath)  iodine (Short breath; Swelling)  penicillin (Anaphylaxis)  shellfish (Anaphylaxis)  tetanus toxoid (Short breath)  Valium (Short breath)    Intolerances      MEDICATIONS  (STANDING):  albuterol/ipratropium for Nebulization 3 milliLiter(s) Nebulizer every 6 hours  buDESOnide    Inhalation Suspension 0.5 milliGRAM(s) Inhalation every 12 hours  chlorhexidine 2% Cloths 1 Application(s) Topical <User Schedule>  collagenase Ointment 1 Application(s) Topical daily  DOBUTamine Infusion 5 MICROgram(s)/kG/Min (10.2 mL/Hr) IV Continuous <Continuous>  hydrocortisone sodium succinate Injectable 100 milliGRAM(s) IV Push every 8 hours  insulin regular Infusion 3 Unit(s)/Hr (3 mL/Hr) IV Continuous <Continuous>  meropenem  IVPB 1000 milliGRAM(s) IV Intermittent every 12 hours  meropenem  IVPB      mexiletine 200 milliGRAM(s) Oral every 8 hours  montelukast 10 milliGRAM(s) Oral at bedtime  multivitamin 1 Tablet(s) Oral daily  nystatin    Suspension 364009 Unit(s) Oral every 6 hours  pantoprazole  Injectable 40 milliGRAM(s) IV Push daily  potassium chloride  10 mEq/50 mL IVPB 10 milliEquivalent(s) IV Intermittent every 1 hour  sodium chloride 0.9%. 1000 milliLiter(s) (50 mL/Hr) IV Continuous <Continuous>  vancomycin  IVPB 1000 milliGRAM(s) IV Intermittent every 24 hours  vasopressin Infusion 0.04 Unit(s)/Min (6 mL/Hr) IV Continuous <Continuous>        hydrocortisone sodium succinate Injectable   100 milliGRAM(s) IV Push (09-29-22 @ 05:00)   100 milliGRAM(s) IV Push (09-28-22 @ 22:41)   100 milliGRAM(s) IV Push (09-28-22 @ 13:23)    insulin regular Infusion   3 mL/Hr IV Continuous (09-28-22 @ 22:47)    vasopressin Infusion   6 mL/Hr IV Continuous (09-28-22 @ 22:46)        PHYSICAL EXAM:  VITALS: T(C): 36.7 (09-29-22 @ 08:00)  T(F): 98.1 (09-29-22 @ 08:00), Max: 98.8 (09-28-22 @ 12:00)  HR: 92 (09-29-22 @ 10:00) (80 - 101)  BP: 137/64 (09-29-22 @ 06:30) (95/46 - 153/67)  RR:  (20 - 28)  SpO2:  (94% - 100%)  Wt(kg): --  GENERAL: female laying in bed in NAD  msi ayala, ngt  Respiratory: Respirations unlabored intubated to vent  Extremities: Warm, no edema  NEURO: Alert     LABS:    POCT Blood Glucose.: 103 mg/dL (09-29-22 @ 10:07)  POCT Blood Glucose.: 95 mg/dL (09-29-22 @ 09:40)  POCT Blood Glucose.: 96 mg/dL (09-29-22 @ 09:02)  POCT Blood Glucose.: 104 mg/dL (09-29-22 @ 08:09)  POCT Blood Glucose.: 124 mg/dL (09-29-22 @ 06:45)  POCT Blood Glucose.: 127 mg/dL (09-29-22 @ 05:49)  POCT Blood Glucose.: 134 mg/dL (09-29-22 @ 04:48)  POCT Blood Glucose.: 130 mg/dL (09-29-22 @ 03:53)  POCT Blood Glucose.: 140 mg/dL (09-29-22 @ 02:59)  POCT Blood Glucose.: 162 mg/dL (09-29-22 @ 01:53)  POCT Blood Glucose.: 171 mg/dL (09-29-22 @ 00:56)  POCT Blood Glucose.: 177 mg/dL (09-28-22 @ 23:54)  POCT Blood Glucose.: 171 mg/dL (09-28-22 @ 22:48)  POCT Blood Glucose.: 171 mg/dL (09-28-22 @ 21:57)  POCT Blood Glucose.: 204 mg/dL (09-28-22 @ 21:11)  POCT Blood Glucose.: 191 mg/dL (09-28-22 @ 19:59)  POCT Blood Glucose.: 191 mg/dL (09-28-22 @ 19:21)  POCT Blood Glucose.: 189 mg/dL (09-28-22 @ 17:23)  POCT Blood Glucose.: 100 mg/dL (09-28-22 @ 13:05)  POCT Blood Glucose.: 118 mg/dL (09-28-22 @ 11:50)  POCT Blood Glucose.: 125 mg/dL (09-28-22 @ 11:23)  POCT Blood Glucose.: 153 mg/dL (09-28-22 @ 08:54)  POCT Blood Glucose.: 157 mg/dL (09-28-22 @ 07:39)  POCT Blood Glucose.: 156 mg/dL (09-28-22 @ 06:42)  POCT Blood Glucose.: 181 mg/dL (09-28-22 @ 06:10)  POCT Blood Glucose.: 178 mg/dL (09-28-22 @ 05:23)  POCT Blood Glucose.: 175 mg/dL (09-28-22 @ 04:47)  POCT Blood Glucose.: 200 mg/dL (09-28-22 @ 04:05)  POCT Blood Glucose.: 233 mg/dL (09-28-22 @ 02:57)  POCT Blood Glucose.: 253 mg/dL (09-28-22 @ 01:42)  POCT Blood Glucose.: 235 mg/dL (09-27-22 @ 23:52)  POCT Blood Glucose.: 252 mg/dL (09-27-22 @ 23:06)  POCT Blood Glucose.: 90 mg/dL (09-27-22 @ 22:06)  POCT Blood Glucose.: 108 mg/dL (09-27-22 @ 21:08)  POCT Blood Glucose.: 141 mg/dL (09-27-22 @ 20:03)  POCT Blood Glucose.: 197 mg/dL (09-27-22 @ 18:03)  POCT Blood Glucose.: 99 mg/dL (09-27-22 @ 17:03)  POCT Blood Glucose.: 98 mg/dL (09-27-22 @ 15:11)  POCT Blood Glucose.: 113 mg/dL (09-27-22 @ 14:09)  POCT Blood Glucose.: 173 mg/dL (09-27-22 @ 13:09)  POCT Blood Glucose.: 271 mg/dL (09-27-22 @ 11:48)  POCT Blood Glucose.: 350 mg/dL (09-27-22 @ 05:46)  POCT Blood Glucose.: 241 mg/dL (09-27-22 @ 02:07)  POCT Blood Glucose.: 132 mg/dL (09-26-22 @ 22:31)  POCT Blood Glucose.: 167 mg/dL (09-26-22 @ 19:49)  POCT Blood Glucose.: 244 mg/dL (09-26-22 @ 16:10)  POCT Blood Glucose.: 339 mg/dL (09-26-22 @ 11:20)                            9.1    15.28 )-----------( 151      ( 29 Sep 2022 00:14 )             27.5       09-29    145  |  104  |  46<H>  ----------------------------<  178<H>  3.7   |  25  |  1.46<H>    Ca    9.8      29 Sep 2022 00:14  Phos  4.8     09-29  Mg     2.4     09-29    TPro  6.4  /  Alb  3.7  /  TBili  1.9<H>  /  DBili  x   /  AST  2253<H>  /  ALT  2507<H>  /  AlkPhos  122<H>  09-29      eGFR: 38 mL/min/1.73m2 (29 Sep 2022 00:14)  eGFR: 36 mL/min/1.73m2 (28 Sep 2022 19:26)          Thyroid Function Tests:  09-06 @ 16:46 TSH 3.30 FreeT4 -- T3 -- Anti TPO -- Anti Thyroglobulin Ab -- TSI --          A1C with Estimated Average Glucose Result: 9.4 % (09-06-22 @ 16:46)  A1C with Estimated Average Glucose Result: 9.2 % (07-11-22 @ 07:36)      Estimated Average Glucose: 223 mg/dL (09-06-22 @ 16:46)  Estimated Average Glucose: 217 mg/dL (07-11-22 @ 07:36)                          seen earlier today     Chief Complaint: Type 2 Diabetes Mellitus     INTERVAL HX: on insulin gtt requiring 0-6 units,on/off gtt depending on BG , off since 8 am, off pressors at time of visit    steroid tapered to HCT 50mg q8h  , intubated. NPO at time of visit    Review of Systems:  unable to complete pt intubated     Allergies    aspirin (Short breath)  Avelox (Short breath; Pruritus)  cefepime (Anaphylaxis)  codeine (Short breath)  Dilaudid (Short breath)  iodine (Short breath; Swelling)  penicillin (Anaphylaxis)  shellfish (Anaphylaxis)  tetanus toxoid (Short breath)  Valium (Short breath)    Intolerances      MEDICATIONS  (STANDING):  albuterol/ipratropium for Nebulization 3 milliLiter(s) Nebulizer every 6 hours  buDESOnide    Inhalation Suspension 0.5 milliGRAM(s) Inhalation every 12 hours  chlorhexidine 2% Cloths 1 Application(s) Topical <User Schedule>  collagenase Ointment 1 Application(s) Topical daily  DOBUTamine Infusion 5 MICROgram(s)/kG/Min (10.2 mL/Hr) IV Continuous <Continuous>  hydrocortisone sodium succinate Injectable 100 milliGRAM(s) IV Push every 8 hours  insulin regular Infusion 3 Unit(s)/Hr (3 mL/Hr) IV Continuous <Continuous>  meropenem  IVPB 1000 milliGRAM(s) IV Intermittent every 12 hours  meropenem  IVPB      mexiletine 200 milliGRAM(s) Oral every 8 hours  montelukast 10 milliGRAM(s) Oral at bedtime  multivitamin 1 Tablet(s) Oral daily  nystatin    Suspension 931537 Unit(s) Oral every 6 hours  pantoprazole  Injectable 40 milliGRAM(s) IV Push daily  potassium chloride  10 mEq/50 mL IVPB 10 milliEquivalent(s) IV Intermittent every 1 hour  sodium chloride 0.9%. 1000 milliLiter(s) (50 mL/Hr) IV Continuous <Continuous>  vancomycin  IVPB 1000 milliGRAM(s) IV Intermittent every 24 hours  vasopressin Infusion 0.04 Unit(s)/Min (6 mL/Hr) IV Continuous <Continuous>        hydrocortisone sodium succinate Injectable   100 milliGRAM(s) IV Push (09-29-22 @ 05:00)   100 milliGRAM(s) IV Push (09-28-22 @ 22:41)   100 milliGRAM(s) IV Push (09-28-22 @ 13:23)    insulin regular Infusion   3 mL/Hr IV Continuous (09-28-22 @ 22:47)    vasopressin Infusion   6 mL/Hr IV Continuous (09-28-22 @ 22:46)        PHYSICAL EXAM:  VITALS: T(C): 36.7 (09-29-22 @ 08:00)  T(F): 98.1 (09-29-22 @ 08:00), Max: 98.8 (09-28-22 @ 12:00)  HR: 92 (09-29-22 @ 10:00) (80 - 101)  BP: 137/64 (09-29-22 @ 06:30) (95/46 - 153/67)  RR:  (20 - 28)  SpO2:  (94% - 100%)  Wt(kg): --  GENERAL: female laying in bed in NAD  msi ayala, layton, ashley   Respiratory: Respirations unlabored intubated to vent  Extremities: Warm, no edema  NEURO: Alert     LABS:    POCT Blood Glucose.: 103 mg/dL (09-29-22 @ 10:07)  POCT Blood Glucose.: 95 mg/dL (09-29-22 @ 09:40)  POCT Blood Glucose.: 96 mg/dL (09-29-22 @ 09:02)  POCT Blood Glucose.: 104 mg/dL (09-29-22 @ 08:09)  POCT Blood Glucose.: 124 mg/dL (09-29-22 @ 06:45)  POCT Blood Glucose.: 127 mg/dL (09-29-22 @ 05:49)  POCT Blood Glucose.: 134 mg/dL (09-29-22 @ 04:48)  POCT Blood Glucose.: 130 mg/dL (09-29-22 @ 03:53)  POCT Blood Glucose.: 140 mg/dL (09-29-22 @ 02:59)  POCT Blood Glucose.: 162 mg/dL (09-29-22 @ 01:53)  POCT Blood Glucose.: 171 mg/dL (09-29-22 @ 00:56)  POCT Blood Glucose.: 177 mg/dL (09-28-22 @ 23:54)  POCT Blood Glucose.: 171 mg/dL (09-28-22 @ 22:48)  POCT Blood Glucose.: 171 mg/dL (09-28-22 @ 21:57)  POCT Blood Glucose.: 204 mg/dL (09-28-22 @ 21:11)  POCT Blood Glucose.: 191 mg/dL (09-28-22 @ 19:59)  POCT Blood Glucose.: 191 mg/dL (09-28-22 @ 19:21)  POCT Blood Glucose.: 189 mg/dL (09-28-22 @ 17:23)  POCT Blood Glucose.: 100 mg/dL (09-28-22 @ 13:05)  POCT Blood Glucose.: 118 mg/dL (09-28-22 @ 11:50)  POCT Blood Glucose.: 125 mg/dL (09-28-22 @ 11:23)  POCT Blood Glucose.: 153 mg/dL (09-28-22 @ 08:54)  POCT Blood Glucose.: 157 mg/dL (09-28-22 @ 07:39)  POCT Blood Glucose.: 156 mg/dL (09-28-22 @ 06:42)  POCT Blood Glucose.: 181 mg/dL (09-28-22 @ 06:10)  POCT Blood Glucose.: 178 mg/dL (09-28-22 @ 05:23)  POCT Blood Glucose.: 175 mg/dL (09-28-22 @ 04:47)  POCT Blood Glucose.: 200 mg/dL (09-28-22 @ 04:05)  POCT Blood Glucose.: 233 mg/dL (09-28-22 @ 02:57)  POCT Blood Glucose.: 253 mg/dL (09-28-22 @ 01:42)  POCT Blood Glucose.: 235 mg/dL (09-27-22 @ 23:52)  POCT Blood Glucose.: 252 mg/dL (09-27-22 @ 23:06)  POCT Blood Glucose.: 90 mg/dL (09-27-22 @ 22:06)  POCT Blood Glucose.: 108 mg/dL (09-27-22 @ 21:08)  POCT Blood Glucose.: 141 mg/dL (09-27-22 @ 20:03)  POCT Blood Glucose.: 197 mg/dL (09-27-22 @ 18:03)  POCT Blood Glucose.: 99 mg/dL (09-27-22 @ 17:03)  POCT Blood Glucose.: 98 mg/dL (09-27-22 @ 15:11)  POCT Blood Glucose.: 113 mg/dL (09-27-22 @ 14:09)  POCT Blood Glucose.: 173 mg/dL (09-27-22 @ 13:09)  POCT Blood Glucose.: 271 mg/dL (09-27-22 @ 11:48)  POCT Blood Glucose.: 350 mg/dL (09-27-22 @ 05:46)  POCT Blood Glucose.: 241 mg/dL (09-27-22 @ 02:07)  POCT Blood Glucose.: 132 mg/dL (09-26-22 @ 22:31)  POCT Blood Glucose.: 167 mg/dL (09-26-22 @ 19:49)  POCT Blood Glucose.: 244 mg/dL (09-26-22 @ 16:10)  POCT Blood Glucose.: 339 mg/dL (09-26-22 @ 11:20)                            9.1    15.28 )-----------( 151      ( 29 Sep 2022 00:14 )             27.5       09-29    145  |  104  |  46<H>  ----------------------------<  178<H>  3.7   |  25  |  1.46<H>    Ca    9.8      29 Sep 2022 00:14  Phos  4.8     09-29  Mg     2.4     09-29    TPro  6.4  /  Alb  3.7  /  TBili  1.9<H>  /  DBili  x   /  AST  2253<H>  /  ALT  2507<H>  /  AlkPhos  122<H>  09-29      eGFR: 38 mL/min/1.73m2 (29 Sep 2022 00:14)  eGFR: 36 mL/min/1.73m2 (28 Sep 2022 19:26)          Thyroid Function Tests:  09-06 @ 16:46 TSH 3.30 FreeT4 -- T3 -- Anti TPO -- Anti Thyroglobulin Ab -- TSI --          A1C with Estimated Average Glucose Result: 9.4 % (09-06-22 @ 16:46)  A1C with Estimated Average Glucose Result: 9.2 % (07-11-22 @ 07:36)      Estimated Average Glucose: 223 mg/dL (09-06-22 @ 16:46)  Estimated Average Glucose: 217 mg/dL (07-11-22 @ 07:36)

## 2022-09-29 NOTE — PROGRESS NOTE ADULT - SUBJECTIVE AND OBJECTIVE BOX
Patient seen and examined at the bedside.    Remained critically ill on continuous ICU monitoring.    =================== LABS =========================                        9.1    15.28 )-----------( 151      ( 29 Sep 2022 00:14 )             27.5     09-29    145  |  108  |  53<H>  ----------------------------<  183<H>  4.0   |  22  |  1.21    Ca    9.7      29 Sep 2022 18:15  Phos  4.8     09-29  Mg     2.4     09-29    TPro  6.6  /  Alb  3.6  /  TBili  2.1<H>  /  DBili  x   /  AST  981<H>  /  ALT  1939<H>  /  AlkPhos  143<H>  09-29    LIVER FUNCTIONS - ( 29 Sep 2022 18:15 )  Alb: 3.6 g/dL / Pro: 6.6 g/dL / ALK PHOS: 143 U/L / ALT: 1939 U/L / AST: 981 U/L / GGT: x           PT/INR - ( 29 Sep 2022 18:15 )   PT: 21.8 sec;   INR: 1.87 ratio         PTT - ( 29 Sep 2022 00:14 )  PTT:33.9 sec  ABG - ( 29 Sep 2022 17:00 )  pH, Arterial: 7.50  pH, Blood: x     /  pCO2: 31    /  pO2: 110   / HCO3: 24    / Base Excess: 1.3   /  SaO2: 98.1                  ==================================================    OBJECTIVE:  Vital Signs Last 24 Hrs  T(C): 37.3 (29 Sep 2022 20:00), Max: 37.5 (29 Sep 2022 16:00)  T(F): 99.1 (29 Sep 2022 20:00), Max: 99.5 (29 Sep 2022 16:00)  HR: 104 (29 Sep 2022 20:00) (86 - 117)  BP: 137/64 (29 Sep 2022 06:30) (97/50 - 151/67)  BP(mean): 92 (29 Sep 2022 06:30) (69 - 96)  RR: 23 (29 Sep 2022 20:00) (10 - 31)  SpO2: 100% (29 Sep 2022 20:00) (98% - 100%)    Parameters below as of 29 Sep 2022 20:00  Patient On (Oxygen Delivery Method): ventilator    O2 Concentration (%): 40    REVIEW OF SYSTEMS:  Gen: No fever  EYES/ENT: No visual changes;  No vertigo or throat pain   NECK: No pain   RES:  No shortness of breath or Cough  CARD: No chest pain   GI: No abdominal pain  : No dysuria  NEURO: No weakness  SKIN: No itching, rashes      Physical Exam:  General: intubated,  Neurology: nonfocal, moves extremities not on command   ENT/Neck: Neck supple, trachea midline, No JVD  Respiratory: rhonchi throughout    CV: S1S2, no murmurs        [x] Sinus Rhythm   Abdominal: Soft, NT, ND, colostomy in place  Extremities: trace pedal edema noted, + peripheral pulses   Skin: Dry dressing over right heel, no Rashes, Hematoma, Ecchymosis                  Assessment:  71F PMH DM, COPD, Chronic Adrenal Insufficiency on Chronic prednisone, history of colorectal cancer s/p resection (colostomy bag), Hx of CAD, Chronic A fib on Eliquis, and tracheomalacia and multiple intubations, recent dx of OM presented to ED at [facility] this morning with epigastric pain, belching and central chest pain. Found on CTA to have type A aortic dissection and transferred to [facility] for surgical evaluation by Dr. Cabrera.     Ascending aortic dissection s/p modified bentall and hemiarch on 9/6   Hypovolemic shock   Post op respiratory insufficiency  Acute blood loss anemia  Stress hyperglycemia   Lactic Acidosis  RIJ thrombus  Pancreatic cyst    Proteus PNA  Leukocytosis    Plan:   ***Neuro***  [x] Nonfocal    Post operative neuro assessment     ***Cardiovascular***  9/27: Patient returned to ICU for presumed sepsis, presenting with tachypnea, fevers, tachycardia  CT Chest on 9/12:  Status post recent ascending aortic dissection repair. Small amount of fluid surrounding the ascending aorta without evidence of enhancing wall to suggest abscess.  RUE duplex on 9/12: There is a thrombosed and occluded RIJ      Invasive hemodynamic monitoring, assess perfusion indices   SR/ CVP 17/ MAP 72/ Hct 29%/ Lactate 2.2  [x] Vasopressin - currently off [x] Dobutamine- 5mcgs   Continue Mexiletine for VT   Blood pressure management with Cardene     ***Pulmonary***  CT Chest on 9/12: Bilateral lower lobe atelectasis with bilateral pleural effusions   CTA yesterday: RLL consolidation  Reintubated for change in mental status on 9/8, self extubated on 9/9 and reintubated again, extubated to HFO2 on 9/13 9/27 ReIntubated for airway protection (high peak/plateau pressures on volume control. Switched to Pressure control.) ARDS ?   Full vent support / Mar -10ppm   Hx of COPD / Continue bronchodilators / Continue with Montelukast   Monitor secretions and suction PRN       Mode: AC/ CMV (Assist Control/ Continuous Mandatory Ventilation)  RR (machine): 16  FiO2: 40  PEEP: 8  ITime: 1  MAP: 11  PC: 18  PIP: 24            Will plan to wean & extubate once pt is hemodynamically stable and not bleeding    ***GI***  CT A/P on 9/12: Mild thickening of the cecum and ascending colon possibly representing mild nonspecific proximal colitis. Hepatic cirrhosis. The focal IPMN of the pancreas with large cyst within the tail the pancreas measuring 3.1 cm.  Colostomy bag in place, continue to monitor output   Passed FEES on 9/20, CC diet with reg liquids, pt with decreased appetite need to encourage PO intake    [x] Tolerating tube feeds with Glucerna 1.5 Chago @10cc/hr with goal rate 40cc/hr  [x] Protonix   Simethicone PRN gas      ***Renal***  GFR 47  Continue to monitor I/Os, BUN/Creatinine.   Replete lytes PRN  Amezcua present     ***ID***  SCx on 9/8 +Proteus mirabilis, on 9/27 +Staphylococcus aureus  UA on 9/9+ LE, WBC 60, few yeast  /  UCx on 9/10 NG   BCx on 9/10 NGTD, BCx on 9/12 NGTD 9/16 bcx NGTD, 9/20 NGTD, 9/27 NGTD, 9/28 NGTD   Nystatin for thrush   Broad spectrum antibiotics (meropenem and vancomycin started for presumed sepsis) 9/27        ***Endocrine***  [x]  DM : HbA1c 9.4%                - [x] Insulin gtt              - Need tight glycemic control to prevent wound infection.  Endo following for recs now that patient has some PO intake with tube feed supplementation       Patient requires continuous monitoring with bedside rhythm monitoring, pulse oximetry monitoring, and continuous central venous and arterial pressure monitoring; and intermittent blood gas analysis. Care plan discussed with the ICU care team.   Patient remained critical, at risk for life threatening decompensation.    By signing my name below, I, Gisella Bailey, attest that this documentation has been prepared under the direction and in the presence of Alysa Tejada NP   Electronically signed: Georgi Lozada, 09-29-22 @ 20:16    I, Alysa Tejada, personally performed the services described in this documentation. all medical record entries made by the marcyibe were at my direction and in my presence. I have reviewed the chart and agree that the record reflects my personal performance and is accurate and complete  Electronically signed: Alysa Tejada NP

## 2022-09-29 NOTE — PROGRESS NOTE ADULT - ASSESSMENT
71 year-old female with a history of DM2, CAD, A-Fib on apixaban, Severe Persistent Asthma (on chronic steroids, recently started on Tezspire), colon cancer s/p resection/chemo, and tracheobronchomalacia s/p tracheoplasty, and recent OM of the R foot s/b debridement and completed course of Vancomycin/Ertapenem for MRSA/ESBL Proteus/Corynebacterium who now presents with chest pain, found to have Type A dissection s/p repair with modified Bentall procedure and hemiarch replacement on 9/6/22. Post-op course complicated by acute hypoxemic respiratory failure, shock, anemia, and hyperglycemia requiring insulin gtt. Extubated 9/13, now awake and alert with mild encephalopathy but overall significantly improved.    Assessment:  Acute hypoxemic respiratory failure requiring intubation  Type A Aortic Dissection  Severe Persistent Asthma  History of Tracheobronchomalacia    Plan:  See below:  -**************************                                SEE Below:  9/14-improving but weakness  9/15-ABX adjusted to ceftriaxone (LFTS)-PICU  9/16-PICU, low grade temp-fever work up in progress, no resp decline or sx  9/19-resp stable at present but tenuous  9/20-for FEESST today, NGT in place w/TF  9/21-s/p FEESST-passed, remains in PICU          9/22-TF in place at 40cc, more OOB          9/23-hypoglycemia noted and rx, no change in resp status  9/28-vented/sedated-pressors/inotropes/ABX  9/29-remains vented on nitrous/less O2 needs, improving LFTS

## 2022-09-29 NOTE — PROGRESS NOTE ADULT - NUTRITIONAL ASSESSMENT
Diet, Consistent Carbohydrate/No Snacks:   DASH/TLC {Sodium & Cholesterol Restricted} (DASH)  Tube Feeding Modality: Nasogastric  Glucerna 1.5 Chago (GLUCERNA1.5RTH)  Total Volume for 24 Hours (mL): 400  Intermittent  Starting Tube Feed Rate {mL per Hour}: 40  Until Goal Tube Feed Rate (mL per Hour): 40  Tube Feeding Hours ON: 10  Tube Feeding OFF (Hours): 14  Tube Feed Start Time: 20:00  Supplement Feeding Modality:  Oral  Glucerna Shake Cans or Servings Per Day:  3       Frequency:  Daily (09-24-22 @ 10:23) [Active]    Please see RD assessment and/or follow up.  Managed by primary team as well Diet, Consistent Carbohydrate/No Snacks:   DASH/TLC {Sodium & Cholesterol Restricted} (DASH)  Tube Feeding Modality: Nasogastric  Glucerna 1.5 Chago (GLUCERNA1.5RTH)  Total Volume for 24 Hours (mL): 400  Intermittent  Starting Tube Feed Rate {mL per Hour}: 40  Until Goal Tube Feed Rate (mL per Hour): 40  Tube Feeding Hours ON: 10  Tube Feeding OFF (Hours): 14  Tube Feed Start Time: 20:00  Supplement Feeding Modality:  Oral  Glucerna Shake Cans or Servings Per Day:  3       Frequency:  Daily (09-24-22 @ 10:23) [Active] 71

## 2022-09-29 NOTE — PROGRESS NOTE ADULT - SUBJECTIVE AND OBJECTIVE BOX
Follow Up:  fever    Interval History/ROS: pt still intubated in CTU, LFTs slightly better still in 2000, sputum cx showed staph aureus        Allergies  aspirin (Short breath)  Avelox (Short breath; Pruritus)  cefepime (Anaphylaxis)  codeine (Short breath)  Dilaudid (Short breath)  iodine (Short breath; Swelling)  penicillin (Anaphylaxis)  shellfish (Anaphylaxis)  tetanus toxoid (Short breath)  Valium (Short breath)        ANTIMICROBIALS:  meropenem  IVPB 1000 every 12 hours  meropenem  IVPB    nystatin    Suspension 324325 every 6 hours  vancomycin  IVPB 1000 every 24 hours      OTHER MEDS:  albuterol/ipratropium for Nebulization 3 milliLiter(s) Nebulizer every 6 hours  buDESOnide    Inhalation Suspension 0.5 milliGRAM(s) Inhalation every 12 hours  chlorhexidine 2% Cloths 1 Application(s) Topical <User Schedule>  collagenase Ointment 1 Application(s) Topical daily  DOBUTamine Infusion 5 MICROgram(s)/kG/Min IV Continuous <Continuous>  hydrocortisone sodium succinate Injectable 50 milliGRAM(s) IV Push every 8 hours  insulin regular Infusion 3 Unit(s)/Hr IV Continuous <Continuous>  mexiletine 200 milliGRAM(s) Oral every 8 hours  montelukast 10 milliGRAM(s) Oral at bedtime  multivitamin 1 Tablet(s) Oral daily  pantoprazole  Injectable 40 milliGRAM(s) IV Push daily  simethicone drops 80 milliGRAM(s) Oral every 12 hours PRN  sodium chloride 0.9%. 1000 milliLiter(s) IV Continuous <Continuous>  vasopressin Infusion 0.04 Unit(s)/Min IV Continuous <Continuous>      Vital Signs Last 24 Hrs  T(C): 36.8 (29 Sep 2022 12:00), Max: 36.8 (29 Sep 2022 12:00)  T(F): 98.2 (29 Sep 2022 12:00), Max: 98.2 (29 Sep 2022 12:00)  HR: 96 (29 Sep 2022 14:00) (80 - 101)  BP: 137/64 (29 Sep 2022 06:30) (95/46 - 153/67)  BP(mean): 92 (29 Sep 2022 06:30) (67 - 100)  RR: 18 (29 Sep 2022 14:00) (10 - 28)  SpO2: 100% (29 Sep 2022 14:00) (94% - 100%)    Parameters below as of 29 Sep 2022 12:00  Patient On (Oxygen Delivery Method): ventilator    O2 Concentration (%): 50    Physical Exam:  General:   intubated, sedated  Respiratory:  clear b/l,    no wheezing  abd:     soft,   BS +,   no tenderness, ostomy   :   no CVAT,  no suprapubic tenderness,   no  ramirez  Musculoskeletal:   no joint swelling  vascular: R chest port with no tenderness or erythema, RIJ                        9.1    15.28 )-----------( 151      ( 29 Sep 2022 00:14 )             27.5       09-29    145  |  104  |  46<H>  ----------------------------<  178<H>  3.7   |  25  |  1.46<H>    Ca    9.8      29 Sep 2022 00:14  Phos  4.8     09-29  Mg     2.4     09-29    TPro  6.4  /  Alb  3.7  /  TBili  1.9<H>  /  DBili  x   /  AST  2253<H>  /  ALT  2507<H>  /  AlkPhos  122<H>  09-29          MICROBIOLOGY:  Vancomycin Level, Trough: 16.8 ug/mL (09-29-22 @ 11:12)  Vancomycin Level, Trough: 20.9 ug/mL (09-29-22 @ 01:04)  v  .Blood Blood-Peripheral  09-28-22   No growth to date.  --  --      Trach Asp Tracheal Aspirate  09-27-22   Moderate Staphylococcus aureus  --    Moderate polymorphonuclear leukocytes per low power field  No Squamous epithelial cells per low power field  Moderate Gram Positive Cocci in Clusters seen per oil power field      Catheterized Catheterized  09-27-22   10,000 - 49,000 CFU/mL Candida glabrata "Susceptibilities not performed"  --  --      .Blood Blood-Peripheral  09-27-22   No growth to date.  --  --      .Blood Blood-Peripheral  09-27-22   No growth to date.  --  --      .Blood Blood  09-20-22   No Growth Final  --  --      Catheterized Catheterized  09-16-22   50,000 - 99,000 CFU/mL Candida albicans "Susceptibilities not performed"  --  --      .Blood Blood-Peripheral  09-16-22   No Growth Final  --  --      .Blood Blood  09-12-22   No Growth Final  --  --      .Blood Blood  09-10-22   No Growth Final  --  --      Catheterized Catheterized  09-10-22   <10,000 CFU/mL Normal Urogenital Jessica  --  --      .Sputum Sputum  09-08-22   Numerous Proteus mirabilis  Unable to evaluate further due to Proteus overgrowth  --  Proteus mirabilis                RADIOLOGY:  Images independently visualized and reviewed personally, findings as below  < from: Xray Chest 1 View- PORTABLE-Routine (Xray Chest 1 View- PORTABLE-Routine in AM.) (09.29.22 @ 03:23) >  IMPRESSION:    Postsurgical patient. Lines and tubes as above.    Bilateral lower lung opacities appear unchanged. Small left effusion.    < end of copied text >  < from: CT Angio Abdomen and Pelvis w/ IV Cont (09.28.22 @ 15:32) >  IMPRESSION:    No CT evidence of pulmonary embolism.    Status post repair of ascending aortic dissection with a stable   dissection flap originating from the aortic arch and extending into the   infrarenal abdominal aorta, as described above.    New nodular consolidative and groundglass opacities predominantly in the   right lower lobe, suspicious for multifocal pneumonia in the appropriate   clinical setting. Follow-up chest CT is suggested 4-6 weeks after   treatment to assess for resolution.    Interval opacification of the left lower lobe bronchus with complete   atelectasis of the left lower lobe, likely related to mucoid impaction.    New inflammatory changes in the descending and transverse colon,   suggestive of colitis.    < end of copied text >

## 2022-09-29 NOTE — PROGRESS NOTE ADULT - ASSESSMENT
71F w/h/o uncontrolled T2DM (A1C 9.4%) on basal/bolus insulin PTA. Unknown DM complications. Also COPD, secondary adrenal Insufficiency on chronic steroids, colorectal cancer s/p resection (colostomy bag), Chronic A fib on Eliquis, and tracheomalacia and multiple intubations, recent dx of OM presented to ED at [facility] with epigastric pain, belching and central chest pain. Found on CTA to have type A aortic dissection and transferred to [facility] for surgical evaluation by Dr. Cabrera. Now s/p aortic dissection repair on 9/07/22. Endocrine consulted for assistance with uncontrolled DM/steroids. Pt in ICU with ventricular dysfunction/sepsis intubated on pressors and NPO requiring insulin drip. Noted BG 90s to 200s while on drip. Average requirements 3 to 4 units/hr. On stress steroid doses due to present critical condition and h/o adrenal insufficiency.    BG goal (100-180mg/dl).           Problem/Plan - 1:  ·  Problem: Type 2 diabetes mellitus with hyperglycemia.   ·  Plan: -test BG hourly while on insulin gtt. Once off gtt can check q6h if NPO/TF and AC/HS/2AM daily once eating again  -C/w Insulin drip to BG goal 100 to 180s. Would consider stating 3-4 units which is average dose needed instead of starting 5 to 7 units which can place pt at risk for hypoglycemia.   -Discontinue TFs/PO diet order. Still active  -Contact endo team once pt is more stable to restart SQ insulin again  Discharge plan:  - Likely to discharge patient home on basal/bolus insulin. Final regimen pending clinical course.  - Recommend routine outpatient ophthalmology, podiatry  - Can f/u with endocrinologist Dr. Saavedra.  - Make sure pt has Rx for all DM supplies and insulin/ DM meds.  -Based on pt's clinical condition and mental status at this time she is not able to independently manage her DM. Will evaluate pt's ability to manage DM at time of discharge. If unable> pt will need family/ care giver (s) to manage DM care at home  -Will need rehab.     Problem/Plan - 2:  ·  Problem: Adrenal insufficiency.   ·  Plan: On chronic steroids at home: medrol 8mg qd   Per Dr Sherwood needs stress dose steroid  Hydrocortisone 50mg q8h while on pressors. Once pt is off pressors will start slow taper back to oral Prednisone chronic dose.  Pt remains on pressors. Please contact endo team once off pressors for tapering doses.     Problem/Plan - 3:  ·  Problem: Hyperlipidemia.   ·  Plan: Off rosuvastatin 5mg daily  Re start if not contraindicated and as per cardiology  -F/u levels as out pt.      ***INCOMPLETE NOTE****   71F w/h/o uncontrolled T2DM (A1C 9.4%) on basal/bolus insulin PTA. Unknown DM complications. Also COPD, secondary adrenal Insufficiency on chronic steroids, colorectal cancer s/p resection (colostomy bag), Chronic A fib on Eliquis, and tracheomalacia and multiple intubations, recent dx of OM presented to ED at [facility] with epigastric pain, belching and central chest pain. Found on CTA to have type A aortic dissection and transferred to [facility] for surgical evaluation by Dr. Cabrera. Now s/p aortic dissection repair on 9/07/22. Endocrine consulted for assistance with uncontrolled DM/steroids. Pt in ICU with ventricular dysfunction/sepsis intubated on pressors and NPO requiring insulin drip. On stress steroid doses due to present critical condition and h/o adrenal insufficiency.           Problem/Plan - 1:  ·  Problem: Type 2 diabetes mellitus with hyperglycemia.   -BG Goal 100-180mg/dl   -test BG hourly while on insulin gtt. Once off gtt can check q6h if NPO/TF and AC/HS/2AM daily once eating again  -C/w Insulin drip to BG goal 100 to 180s. if hyperglycemic Would consider stating 3-4 units which is average dose needed instead of starting 5 to 7 units which can place pt at risk for hypoglycemia.   -Discontinue TFs/PO diet orderwhile pt is npo. order  Still active  -Contact endo team once pt is more stable to restart SQ insulin again  Discharge plan:  - Likely to discharge patient home on basal/bolus insulin. Final regimen pending clinical course.  - Recommend routine outpatient ophthalmology, podiatry  - Can f/u with endocrinologist Dr. Saavedra.  - Make sure pt has Rx for all DM supplies and insulin/ DM meds.  -Based on pt's clinical condition and mental status at this time she is not able to independently manage her DM. Will evaluate pt's ability to manage DM at time of discharge. If unable> pt will need family/ care giver (s) to manage DM care at home  -Will need rehab.     Problem/Plan - 2:  ·  Problem: Adrenal insufficiency.   ·  Plan: On chronic steroids at home: medrol 8mg qd   Per Dr Sherwood needs stress dose steroid  Hydrocortisone 50mg q8h while on pressors. Once pt is off pressors will start slow taper back to oral Prednisone chronic dose.  Pt remains on pressors. Please contact endo team once off pressors for tapering doses.     Problem/Plan - 3:  ·  Problem: Hyperlipidemia.   ·  Plan: Off rosuvastatin 5mg daily  Re start if not contraindicated and as per cardiology  -F/u levels as out pt.    Discussed with patient and primary  team Sarath BRUNO   Contact via [employer] Teams during business hours  On evenings and weekends, please call [phone] or page endocrine fellow on call.   Email: [email or web address]   Please note that this patient may be followed by different provider tomorrow.    greater than 50% of the encounter was spent counseling and/or coordination of care.  26 minutes spent on total encounter; The necessity of the time spent during the encounter on this date of service was due to development of plan of care/coordination of care/glycemic control through review of labs, blood glucose values and vital signs.  71F w/h/o uncontrolled T2DM (A1C 9.4%) on basal/bolus insulin PTA. Unknown DM complications. Also COPD, secondary adrenal Insufficiency on chronic steroids, colorectal cancer s/p resection (colostomy bag), Chronic A fib on Eliquis, and tracheomalacia and multiple intubations, recent dx of OM presented to ED at [facility] with epigastric pain, belching and central chest pain. Found on CTA to have type A aortic dissection and transferred to [facility] for surgical evaluation by Dr. Cabrera. Now s/p aortic dissection repair on 9/07/22. Endocrine consulted for assistance with uncontrolled DM/steroids. Pt in ICU with ventricular dysfunction/sepsis intubated on pressors and NPO requiring insulin drip. On stress steroid doses due to present critical condition and h/o adrenal insufficiency.           Problem/Plan - 1:  ·  Problem: Type 2 diabetes mellitus with hyperglycemia.   -BG Goal 100-180mg/dl   -test BG hourly while on insulin gtt. Once off gtt can check q6h if NPO/TF and AC/HS/2AM daily once eating again  -C/w Insulin drip to BG goal 100 to 180s. if hyperglycemic Would consider stating 3-4 units which is average dose needed instead of starting 5 to 7 units which can place pt at risk for hypoglycemia.   -Discontinue TFs/PO diet orderwhile pt is npo. order  Still active  -Contact endo team once pt is more stable to restart SQ insulin again  Discharge plan:  - Likely to discharge patient home on basal/bolus insulin. Final regimen pending clinical course.  - Recommend routine outpatient ophthalmology, podiatry  - Can f/u with endocrinologist Dr. Saavedra.  - Make sure pt has Rx for all DM supplies and insulin/ DM meds.  -Based on pt's clinical condition and mental status at this time she is not able to independently manage her DM. Will evaluate pt's ability to manage DM at time of discharge. If unable> pt will need family/ care giver (s) to manage DM care at home  -Will need rehab.     Problem/Plan - 2:  ·  Problem: Adrenal insufficiency.   ·  Plan: On chronic steroids at home: medrol 8mg qd   Per Dr Sherwood needs stress dose steroid  Hydrocortisone 50mg q8h while on pressors. Once pt is off pressors will start slow taper back to oral Prednisone chronic dose.  off pressors, now on HCT 50mg q8h;   If pt remains hemodynamically stable after 24 hours on HCT 50mg q8h plan to begin tapering steroid to home dose below d/w Dr Stevenson endocrine attg , TBD  daily before tapering   9/30 HCT 25 mg q8h x24 hours   10/1 HCT 20 mg at 8am, HCT 10mg  at 1600  10/2 HCT 10mg at 8 am, HCT 10mg at 1600  10/3 Prednisone 8mg qd     Problem/Plan - 3:  ·  Problem: Hyperlipidemia.   ·  Plan: Off rosuvastatin 5mg daily  Re start if not contraindicated and as per cardiology  -F/u levels as out pt.    Discussed with patient and primary  team Sarath BRUNO   Contact via [employer] Teams during business hours  On evenings and weekends, please call [phone] or page endocrine fellow on call.   Email: [email or web address]   Please note that this patient may be followed by different provider tomorrow.    greater than 50% of the encounter was spent counseling and/or coordination of care.  26 minutes spent on total encounter; The necessity of the time spent during the encounter on this date of service was due to development of plan of care/coordination of care/glycemic control through review of labs, blood glucose values and vital signs.

## 2022-09-29 NOTE — PROGRESS NOTE ADULT - ASSESSMENT
71 f with DM, CAD, a-fib, asthma/COPD, Chronic Adrenal Insufficiency on steroids, history of colorectal cancer s/p resection and ostomy, tracheomalacia and multiple intubations, recent admission for sepsis, foot osteo and abscess s/p debridement and OR cx with MRSA, ESBL proteus and corynebacterium s/p 6 weeks of vanco and ertapenem which ended 8/17, now p/w chest pain, found to have  type A aortic dissection, s/p modified Bentall and hemiarch on 9/6/22.   Post op course complicated by shock, respiratory failure, anemia and hyperglycemia.   fever started 9/9, sputum cx with proteus mirabilis    fever post op for aortic dissection, sputum cx with proteus, pt was still febrile on zosyn but last wound cx that showed proteus was ESBL, switched to suraj and still persistently febrile and the proteus now is pan sensitive, chest /abd CT 9/12 with small fluid around the ascending aorta but no enhancing or abscess, b/l lower lobe atelectasis, ?mild colitis  doppler: thrombosed and occluded RIJ  adrenal insufficiency, was on prednisone, was given dexa 9/12 and 9/13 and no more fevers until 16th, extubated 9/13 so the persistent fevers could be in the setting of adrenal insufficiency as imaging and cx negative, pt was extubated and no focal symptoms, completed a course of suraj  blood and urine cx negative, ALT, AST increased today to 200s, no bili or ALK, unlikely due to suraj  penicillin and cefepime allergy in chart but pt has received cefepime and ceftriaxone before  new vomiting and respiratory distress, ?aspiration was intubated, increased WBC, shock, persistent hypotension,  shock liver, AST 3000, AST 7000, lactate: 8  chest/abd CT showed new multifocal pneumonia, opacification of LLL bronchus with complete atelectasis of LLL likely mucous impaction, new transverse colon inflammatory changes s/o colitis, blood cx negative, urine cx candida glabrata, sputum cx staph aurueus  * f/u sputum cx sensitivities   * c/w suraj and vanco (renally dosed)   *  chest/abd/pelvis CT if stable  The above assessment and plan was discussed with CTU    Michelle Rolon MD  contact on teams  After 5pm and on weekends call [phone]

## 2022-09-29 NOTE — PROGRESS NOTE ADULT - SUBJECTIVE AND OBJECTIVE BOX
CRITICAL CARE ATTENDING - CTICU    MEDICATIONS  (STANDING):  albuterol/ipratropium for Nebulization 3 milliLiter(s) Nebulizer every 6 hours  buDESOnide    Inhalation Suspension 0.5 milliGRAM(s) Inhalation every 12 hours  chlorhexidine 2% Cloths 1 Application(s) Topical <User Schedule>  collagenase Ointment 1 Application(s) Topical daily  DOBUTamine Infusion 7.5 MICROgram(s)/kG/Min (15.2 mL/Hr) IV Continuous <Continuous>  hydrocortisone sodium succinate Injectable 100 milliGRAM(s) IV Push every 8 hours  insulin regular Infusion 3 Unit(s)/Hr (3 mL/Hr) IV Continuous <Continuous>  meropenem  IVPB 1000 milliGRAM(s) IV Intermittent every 12 hours  meropenem  IVPB      mexiletine 200 milliGRAM(s) Oral every 8 hours  montelukast 10 milliGRAM(s) Oral at bedtime  multivitamin 1 Tablet(s) Oral daily  nystatin    Suspension 742737 Unit(s) Oral every 6 hours  pantoprazole  Injectable 40 milliGRAM(s) IV Push daily  sodium chloride 0.9%. 1000 milliLiter(s) (50 mL/Hr) IV Continuous <Continuous>  vancomycin  IVPB 1000 milliGRAM(s) IV Intermittent every 24 hours  vasopressin Infusion 0.04 Unit(s)/Min (6 mL/Hr) IV Continuous <Continuous>                                    9.1    15.28 )-----------( 151      ( 29 Sep 2022 00:14 )             27.5           145  |  104  |  46<H>  ----------------------------<  178<H>  3.7   |  25  |  1.46<H>    Ca    9.8      29 Sep 2022 00:14  Phos  4.8       Mg     2.4         TPro  6.4  /  Alb  3.7  /  TBili  1.9<H>  /  DBili  x   /  AST  2253<H>  /  ALT  2507<H>  /  AlkPhos  122<H>        PT/INR - ( 29 Sep 2022 00:14 )   PT: 44.5 sec;   INR: 3.82 ratio         PTT - ( 29 Sep 2022 00:14 )  PTT:33.9 sec    Mode: AC/ CMV (Assist Control/ Continuous Mandatory Ventilation)  RR (machine): 20  FiO2: 40  PEEP: 5  ITime: 0.8  MAP: 11  PC: 20  PIP: 26      Daily     Daily Weight in k.1 (29 Sep 2022 00:00)       @ 07:01  -   @ 07:00  --------------------------------------------------------  IN: 2342.9 mL / OUT: 1965 mL / NET: 377.9 mL          Critically Ill patient  : [ ] preoperative ,   [x ] post operative    Requires :  [x ] Arterial Line   [x ] Central Line  [ ] PA catheter  [ ] IABP  [ ] ECMO  [ ] LVAD  [ ] Ventilator  [ ] pacemaker [ ] Impella.                      [x ] ABG's     [ x] Pulse Oxymetry Monitoring  Bedside evaluation , monitoring , treatment of hemodynamics , fluids , IVP/ IVCD meds.        Diagnosis:     Re Admit to CTICU - Septic Shock    Hypovolemia     Lactic ACidosis     - Modified bentall and hemiarch     Admitted - chest / back pain     Type A Aortic Dissection      Respiratory Failure     Requires chest PT, pulmonary toilet, ambu bagging, suctioning to maintain SaO2,  patent airway and treat atelectasis.     Difficult weaning process - multiple organ system involvement in critically ill patient     Ventilator Management:  [x]AC-rest -   PC    [ ]CPAP-PS Wean    [ ]Trach Collar     [ ]Extubate    [ ] T-Piece  [x ]peep>5    Hemodynamic lability,  instability. Requires IVCD [x] vasopressors [x ]  inotropes  [ ] vasodilator  [ ]IVSS fluid  to maintain MAP, perfusion, C.I.      DM- IVCD Insulin      Requires bedside physical therapy, mobilization and total detention care           COPD     Colostomy - colon CA     Renal Failure - Acute Kidney Injury     CT - [facility] / AB / [facility]VS             I, Bon Jiménez, personally performed the services described in this documentation. All medical record entries made by the scribe were at my direction and in my presence. I have reviewed the chart and agree that the record reflects my personal performance and is accurate and complete.   Bon Jiménez MD.       By signing my name below, I, Kieran Plascencia, attest that this documentation has been prepared under the direction and in the presence of Bon Jiménez MD.   Electronically Signed: Georgi Cordero -29-22 @ 08:14        Discussed with CT surgeon, Physician Assistant - Nurse Practitioner- Critical care medicine team.   Dicussed at  AM / PM rounds.   Chart, labs , films reviewed.    Total Time:  CRITICAL CARE ATTENDING - CTICU    MEDICATIONS  (STANDING):  albuterol/ipratropium for Nebulization 3 milliLiter(s) Nebulizer every 6 hours  buDESOnide    Inhalation Suspension 0.5 milliGRAM(s) Inhalation every 12 hours  chlorhexidine 2% Cloths 1 Application(s) Topical <User Schedule>  collagenase Ointment 1 Application(s) Topical daily  DOBUTamine Infusion 7.5 MICROgram(s)/kG/Min (15.2 mL/Hr) IV Continuous <Continuous>  hydrocortisone sodium succinate Injectable 100 milliGRAM(s) IV Push every 8 hours  insulin regular Infusion 3 Unit(s)/Hr (3 mL/Hr) IV Continuous <Continuous>  meropenem  IVPB 1000 milliGRAM(s) IV Intermittent every 12 hours  meropenem  IVPB      mexiletine 200 milliGRAM(s) Oral every 8 hours  montelukast 10 milliGRAM(s) Oral at bedtime  multivitamin 1 Tablet(s) Oral daily  nystatin    Suspension [id number] Unit(s) Oral every 6 hours  pantoprazole  Injectable 40 milliGRAM(s) IV Push daily  sodium chloride 0.9%. 1000 milliLiter(s) (50 mL/Hr) IV Continuous <Continuous>  vancomycin  IVPB 1000 milliGRAM(s) IV Intermittent every 24 hours  vasopressin Infusion 0.04 Unit(s)/Min (6 mL/Hr) IV Continuous <Continuous>                                    9.1    15.28 )-----------( 151      ( 29 Sep 2022 00:14 )             27.5           145  |  104  |  46<H>  ----------------------------<  178<H>  3.7   |  25  |  1.46<H>    Ca    9.8      29 Sep 2022 00:14  Phos  4.8       Mg     2.4         TPro  6.4  /  Alb  3.7  /  TBili  1.9<H>  /  DBili  x   /  AST  2253<H>  /  ALT  2507<H>  /  AlkPhos  122<H>        PT/INR - ( 29 Sep 2022 00:14 )   PT: 44.5 sec;   INR: 3.82 ratio         PTT - ( 29 Sep 2022 00:14 )  PTT:33.9 sec    Mode: AC/ CMV (Assist Control/ Continuous Mandatory Ventilation)  RR (machine): 20  FiO2: 40  PEEP: 5  ITime: 0.8  MAP: 11  PC: 20  PIP: 26      Daily     Daily Weight in k.1 (29 Sep 2022 00:00)       @ 07:01  -   @ 07:00  --------------------------------------------------------  IN: 2342.9 mL / OUT: 1965 mL / NET: 377.9 mL          Critically Ill patient  : [ ] preoperative ,   [x ] post operative    Requires :  [x ] Arterial Line   [x ] Central Line  [ ] PA catheter  [ ] IABP  [ ] ECMO  [ ] LVAD  [ x] Ventilator  [ ] pacemaker [ ] Impella.                      [x ] ABG's     [ x] Pulse Oxymetry Monitoring  Bedside evaluation , monitoring , treatment of hemodynamics , fluids , IVP/ IVCD meds.        Diagnosis:     Re Admit to CTICU - Septic Shock    CHF- acute [ x]   chronic [ ]    systolic [x ]   diastolic [ ]  Valvular [ ]          - Echo- EF -  40's           [ x] RV dysfunction          - Cxr-cardiomegally, edema          - Clinical-  [ x]inotropes   [x ]pressors   [ ]diuresis   [ ]IABP   [ ]ECMO   [ ]LVAD   [ ]Respiratory Failure         -     Hypovolemia     Lactic ACidosis    Metabolic Acidosis     - Modified bentall and hemiarch     Admitted - chest / back pain     Type A Aortic Dissection      Respiratory Failure     Requires chest PT, pulmonary toilet, ambu bagging, suctioning to maintain SaO2,  patent airway and treat atelectasis.     Difficult weaning process - multiple organ system involvement in critically ill patient     Ventilator Management:  [x]AC-rest -   PC    [ x]CPAP-PS Wean ?   [ ]Trach Collar     [ ]Extubate    [ ] T-Piece  [x ]peep>5    Hemodynamic lability,  instability. Requires IVCD [x] vasopressors [x ]  inotropes  [ ] vasodilator  [x ]IVSS fluid  to maintain MAP, perfusion, C.I.      DM- IVCD Insulin      Requires bedside physical therapy, mobilization and total assisted care           COPD     Colostomy - colon CA     Renal Failure - Acute Kidney Injury     CT - [facility] / AB / PLVS     RUQ sonogram             I, Bon Jiménez, personally performed the services described in this documentation. All medical record entries made by the scribe were at my direction and in my presence. I have reviewed the chart and agree that the record reflects my personal performance and is accurate and complete.   Bon Jiménez MD.       By signing my name below, I, Kieran Plascencia, attest that this documentation has been prepared under the direction and in the presence of Bon Jiménez MD.   Electronically Signed: Georgi Cordero 22 @ 08:14        Discussed with CT surgeon, Physician Assistant - Nurse Practitioner- Critical care medicine team.   Dicussed at  AM / PM rounds.   Chart, labs , films reviewed.    Total Time:  90 min

## 2022-09-29 NOTE — PROGRESS NOTE ADULT - TIME BILLING
as above: remains in resp failure-sepsis physiology-inotropes/pressors/vented/ABX-improving LFTs  multifactorial dyspnea-resp failure-severe persistent asthma, TBM s/p tracheoplasty, s/p Aortic aneurysm repair, Bronchitis (proteus)-O2-keep 90%; wean as able once HD stable  severe persistent asthma--medrol 8mg changed to hydrocortisone 100 q 8, singulair 10, duoneb q 6, budes .5 bid, tezspire 8/29-next 9/29  TBM-s/p tracheoplasty--accapella, unable to use vest due to surgery  s/p Aortic aneurysm repair--as per [facility] staff care  AF-on heparin--eventual eliquis rx               CV-mult pressors/inotropes-MAP above 60  DVT R-IJ-on heparin rx  ID-s/p proteus bronchitis-s/p meropenum changed to ceftriaxone and back to meropenem/vanco- off of ABX as of 9/19--restart of ABX 9/27-meropenem/vanco (check all cx)  PT-OOB as able (on hold)                              GI-TF as able--f/up LFTs       VC dysfunction--aspiration precautions-sp and sw evaln--NGT in place/TF; FEESST passed 9/20  [facility]                                    Heme onc f/up colon ca  prog--critical in CTU                PT-when/if improved    Eulalio Allison MD-Pulmonary   [phone]

## 2022-09-29 NOTE — PROCEDURE NOTE - NSBRONCHPROCDETAILS_GEN_A_CORE_FT
Indication: Atelectasis    Xray Findings: LLL atelectasis    Findings:  Bronchoscope inserted through ETT. ETT noted to be in good position. Airway evaluation revealed Sharp Nichole. SHOSHANA and LLL evaluation revealed mild-moderate amount of thin-thick secretions of LLL. RUL, RML, RLL revealed Mild amount of thin-thick secretions of RLL. Bronchoscope then withdrawn from ETT. Minimal bleeding noted    Specimens: BAL of RLL

## 2022-09-29 NOTE — CHART NOTE - NSCHARTNOTEFT_GEN_A_CORE
Pre-Bronchoscopy Tuberculosis Risk Screening Tool  To reduce the risk for airborne transmission of Mycobacterium tuberculosis, this assessment form must be completed prior to bronchoscopy procedures being performed outside of a negative pressure environment.    Procedure Date:  09-29-22  Health Care Provider Name: Sarath BRUNO  Form Completed By: Sarath BRUNO  Reason for the Bronchoscopy: Secretions/atelectasis  Planned Location for the Procedure: X Intensive Care Unit       Risk Assessment  I. I have personally evaluated this patient for Mycobacterium tuberculosis and I determined the following risk:       X  Low risk or TB     [ ] Significant risk for TB    II. Additional Findings: None    III. Based on the Determined Risk for TB the following Action(s) are Suggested:  1. If there are no risk factors for TB infection proceed with the procedure.  2. If there is low risk or significant risk for TB infection the following recommendations should be followed:            a. Perform the procedure in a negative pressure room, with N95 respirator.            b. If not feasible to move the patient or defer the procedure:                    i. Use a single-bedded room low traffic area to perform the bronchoscopy procedure.                   ii. Place a portable high-efficiency particulate air (HEPA) filter in the space prior to starting the procedure and keep closed.                       Refer to the INF.1132 titled “Tuberculosis Control Strategy Plan” for additional information.                  iii. All Health Care Providers within the procedure room shall wear an N95 respirator.            c. Documentation of the tuberculosis risk assessment should be included within the patient’s medical record.

## 2022-09-29 NOTE — PROGRESS NOTE ADULT - SUBJECTIVE AND OBJECTIVE BOX
CHIEF COMPLAINT: f/up sob, chronic resp failure, TBM, severe persistent asthma, VC dysfunction, Type A aortic dissection s/p repair w/modified "Bentall procedure and hemiarch replacement -vented and sedated on nitrous-50%      Interval Events: liver enzymes improving, ID evaln    REVIEW OF SYSTEMS:  Constitutional: No fevers or chills. No weight loss. No fatigue or generalized malaise.  Eyes: No itching or discharge from the eyes  ENT: No ear pain. No ear discharge. No nasal congestion. No post nasal drip. No epistaxis. No throat pain. No sore throat. No difficulty swallowing.   CV: No chest pain. No palpitations. No lightheadedness or dizziness.   Resp: No dyspnea at rest. No dyspnea on exertion. No orthopnea. No wheezing. No cough. No stridor. No sputum production. No chest pain with respiration.  GI: No nausea. No vomiting. No diarrhea.  MSK: No joint pain or pain in any extremities  Integumentary: No skin lesions. No pedal edema.  Neurological: No gross motor weakness. No sensory changes.  [ ] All other systems negative  [+ ] Unable to assess ROS because semi sedated________    OBJECTIVE:  ICU Vital Signs Last 24 Hrs  T(C): 36.6 (29 Sep 2022 04:00), Max: 37.1 (28 Sep 2022 12:00)  T(F): 97.9 (29 Sep 2022 04:00), Max: 98.8 (28 Sep 2022 12:00)  HR: 100 (29 Sep 2022 06:00) (80 - 102)  BP: 151/67 (29 Sep 2022 06:00) (95/46 - 153/67)  BP(mean): 96 (29 Sep 2022 06:00) (67 - 100)  ABP: 151/57 (29 Sep 2022 06:00) (111/37 - 190/61)  ABP(mean): 87 (29 Sep 2022 06:00) (56 - 99)  RR: 22 (29 Sep 2022 06:00) (20 - 30)  SpO2: 100% (29 Sep 2022 06:00) (94% - 100%)    O2 Parameters below as of 29 Sep 2022 04:00  Patient On (Oxygen Delivery Method): ventilator          Mode: AC/ CMV (Assist Control/ Continuous Mandatory Ventilation), RR (machine): 20, FiO2: 40, PEEP: 5, ITime: 0.8, MAP: 11, PC: 20, PIP: 26     @ 07: @ 07:00  --------------------------------------------------------  IN: 5027.3 mL / OUT: 3110 mL / NET: 1917.3 mL     @ 07: @ 06:09  --------------------------------------------------------  IN: 2337.9 mL / OUT: 1905 mL / NET: 432.9 mL      CAPILLARY BLOOD GLUCOSE  127 (29 Sep 2022 06:00)      POCT Blood Glucose.: 127 mg/dL (29 Sep 2022 05:49)      PHYSICAL EXAM:  General: Arrousable on vent.   HEENT: Atraumatic, normocephalic.                 Mallampatti Grade 2                No nasal congestion.                No tonsillar or pharyngeal exudates.  Lymph Nodes: No palpable lymphadenopathy  Neck: No JVD. No carotid bruit.   Respiratory: Normal chest expansion                         Normal percussion                         Normal and equal air entry                         No wheeze, rhonchi or rales.  Cardiovascular: S1 S2 normal. No murmurs, rubs or gallops.   Abdomen: Soft, non-tender, non-distended. No organomegaly. Normoactive bowel sounds.  Extremities: Warm to touch. Peripheral pulse palpable. No pedal edema.   Skin: No rashes or skin lesions  Neurological: Motor and sensory examination equal and normal in all four extremities.  Psychiatry: unable    HOSPITAL MEDICATIONS:  MEDICATIONS  (STANDING):  albuterol/ipratropium for Nebulization 3 milliLiter(s) Nebulizer every 6 hours  buDESOnide    Inhalation Suspension 0.5 milliGRAM(s) Inhalation every 12 hours  chlorhexidine 2% Cloths 1 Application(s) Topical <User Schedule>  collagenase Ointment 1 Application(s) Topical daily  DOBUTamine Infusion 7.5 MICROgram(s)/kG/Min (15.2 mL/Hr) IV Continuous <Continuous>  hydrocortisone sodium succinate Injectable 100 milliGRAM(s) IV Push every 8 hours  insulin regular Infusion 3 Unit(s)/Hr (3 mL/Hr) IV Continuous <Continuous>  meropenem  IVPB 1000 milliGRAM(s) IV Intermittent every 12 hours  meropenem  IVPB      mexiletine 200 milliGRAM(s) Oral every 8 hours  montelukast 10 milliGRAM(s) Oral at bedtime  multivitamin 1 Tablet(s) Oral daily  nystatin    Suspension 720348 Unit(s) Oral every 6 hours  pantoprazole  Injectable 40 milliGRAM(s) IV Push daily  potassium chloride  10 mEq/50 mL IVPB 10 milliEquivalent(s) IV Intermittent every 1 hour  sodium chloride 0.9%. 1000 milliLiter(s) (50 mL/Hr) IV Continuous <Continuous>  vancomycin  IVPB 1000 milliGRAM(s) IV Intermittent every 24 hours  vasopressin Infusion 0.04 Unit(s)/Min (6 mL/Hr) IV Continuous <Continuous>    MEDICATIONS  (PRN):  simethicone drops 80 milliGRAM(s) Oral every 12 hours PRN Gas      LABS:                        9.1    15.28 )-----------( 151      ( 29 Sep 2022 00:14 )             27.5         145  |  104  |  46<H>  ----------------------------<  178<H>  3.7   |  25  |  1.46<H>    Ca    9.8      29 Sep 2022 00:14  Phos  4.8       Mg     2.4         TPro  6.4  /  Alb  3.7  /  TBili  1.9<H>  /  DBili  x   /  AST  2253<H>  /  ALT  2507<H>  /  AlkPhos  122<H>      PT/INR - ( 29 Sep 2022 00:14 )   PT: 44.5 sec;   INR: 3.82 ratio         PTT - ( 29 Sep 2022 00:14 )  PTT:33.9 sec  Urinalysis Basic - ( 27 Sep 2022 13:57 )    Color: Yellow / Appearance: Clear / S.023 / pH: x  Gluc: x / Ketone: Negative  / Bili: Negative / Urobili: Negative   Blood: x / Protein: 30 mg/dL / Nitrite: Negative   Leuk Esterase: Large / RBC: 3 /hpf / WBC 67 /HPF   Sq Epi: x / Non Sq Epi: 2 /hpf / Bacteria: Negative      Arterial Blood Gas:   @ 04:47  7.49/35/113/27/98.3/3.3  ABG lactate: --  Arterial Blood Gas:   @ 00:00  7.46/37/88/26/97.3/2.4  ABG lactate: --  Arterial Blood Gas:   @ 21:14  7.44/38/118/26/98.3/1.6  ABG lactate: --  Arterial Blood Gas:   @ 18:06  7.45/38/96/26/98.6/2.3  ABG lactate: --  Arterial Blood Gas:   @ 14:45  7.47/37/95/27/97.9/3.1  ABG lactate: --  Arterial Blood Gas:   @ 13:30  7.54/29/92/25/97.7/2.5  ABG lactate: --  Arterial Blood Gas:   @ 09:50  7.51/28/100/22/98.7/-0.2  ABG lactate: --  Arterial Blood Gas:   @ 05:24  7.50/28/98/22/97.9/-0.8  ABG lactate: --  Arterial Blood Gas:   @ 03:00  7.45/31/104/22/98.4/-1.9  ABG lactate: --  Arterial Blood Gas:   @ 01:40  7.44/33/221/22/99.2/-1.3  ABG lactate: --  Arterial Blood Gas:   @ 00:45  7.45/31/193/22/98.8/-2.0  ABG lactate: --  Arterial Blood Gas:   @ 23:23  7.38/36/182/21/98.5/-3.5  ABG lactate: --  Arterial Blood Gas:   @ 22:30  7.43/33/85/22/97.3/-2.1  ABG lactate: --  Arterial Blood Gas:   @ 21:30  7.32/35/81/18/96.7/-7.4  ABG lactate: --  Arterial Blood Gas:   @ 19:00  7.31/48/106/24/98.0/-2.1  ABG lactate: --  Arterial Blood Gas:   @ 17:20  7.22/63/137/26/98.0/-2.3  ABG lactate: --  Arterial Blood Gas:   @ 15:14  7.24/60/153/26/98.3/-2.0  ABG lactate: --  Arterial Blood Gas:   @ 14:15  7.15/65/158/23/98.3/-6.4  ABG lactate: --  Arterial Blood Gas:   @ 13:15  7.16/59/128/21/98.2/-7.7  ABG lactate: --  Arterial Blood Gas:   @ 10:11  7.28/48/118/23/98.0/-4.2  ABG lactate: --  Arterial Blood Gas:   @ 09:01  7.48/33/59/25/90.1/1.5  ABG lactate: --  Arterial Blood Gas:   @ 07:44  7.51/30/63/24/92.1/1.4  ABG lactate: --  Arterial Blood Gas:   @ 07:05  7.50/30/65/23/92.8/0.9  ABG lactate: --    Venous Blood Gas:   @ 00:00  7.42/40/52/26/80.5  VBG Lactate: 4.5  Venous Blood Gas:   @ 01:40  7.36/43/44/24/67.6  VBG Lactate: 8.1  Venous Blood Gas:   @ 00:45  7.34/44/46/24/68.8  VBG Lactate: --  Venous Blood Gas:   @ 23:01  7.28/52/40/24/53.3  VBG Lactate: --  Venous Blood Gas:   @ 15:20  7.19/77/51/29/73.5  VBG Lactate: 3.6      MICROBIOLOGY:     RADIOLOGY:  [ ] Reviewed and interpreted by me    Point of Care Ultrasound Findings:    PFT:    EKG:

## 2022-09-30 LAB
-  AMPICILLIN/SULBACTAM: SIGNIFICANT CHANGE UP
-  CEFAZOLIN: SIGNIFICANT CHANGE UP
-  CLINDAMYCIN: SIGNIFICANT CHANGE UP
-  ERYTHROMYCIN: SIGNIFICANT CHANGE UP
-  GENTAMICIN: SIGNIFICANT CHANGE UP
-  LINEZOLID: SIGNIFICANT CHANGE UP
-  OXACILLIN: SIGNIFICANT CHANGE UP
-  PENICILLIN: SIGNIFICANT CHANGE UP
-  RIFAMPIN: SIGNIFICANT CHANGE UP
-  TETRACYCLINE: SIGNIFICANT CHANGE UP
-  TRIMETHOPRIM/SULFAMETHOXAZOLE: SIGNIFICANT CHANGE UP
-  VANCOMYCIN: SIGNIFICANT CHANGE UP
ALBUMIN SERPL ELPH-MCNC: 3.6 G/DL — SIGNIFICANT CHANGE UP (ref 3.3–5)
ALP SERPL-CCNC: 164 U/L — HIGH (ref 40–120)
ALT FLD-CCNC: 1742 U/L — HIGH (ref 10–45)
ANION GAP SERPL CALC-SCNC: 14 MMOL/L — SIGNIFICANT CHANGE UP (ref 5–17)
APTT BLD: 29 SEC — SIGNIFICANT CHANGE UP (ref 27.5–35.5)
AST SERPL-CCNC: 734 U/L — HIGH (ref 10–40)
BASE EXCESS BLDV CALC-SCNC: 2.7 MMOL/L — SIGNIFICANT CHANGE UP (ref -2–3)
BILIRUB SERPL-MCNC: 2 MG/DL — HIGH (ref 0.2–1.2)
BLD GP AB SCN SERPL QL: NEGATIVE — SIGNIFICANT CHANGE UP
BUN SERPL-MCNC: 47 MG/DL — HIGH (ref 7–23)
CA-I SERPL-SCNC: 1.2 MMOL/L — SIGNIFICANT CHANGE UP (ref 1.15–1.33)
CALCIUM SERPL-MCNC: 9.5 MG/DL — SIGNIFICANT CHANGE UP (ref 8.4–10.5)
CHLORIDE BLDV-SCNC: 111 MMOL/L — HIGH (ref 96–108)
CHLORIDE SERPL-SCNC: 111 MMOL/L — HIGH (ref 96–108)
CO2 BLDV-SCNC: 27 MMOL/L — HIGH (ref 22–26)
CO2 SERPL-SCNC: 23 MMOL/L — SIGNIFICANT CHANGE UP (ref 22–31)
CREAT SERPL-MCNC: 1.01 MG/DL — SIGNIFICANT CHANGE UP (ref 0.5–1.3)
CULTURE RESULTS: SIGNIFICANT CHANGE UP
EGFR: 58 ML/MIN/1.73M2 — LOW
FIBRINOGEN PPP-MCNC: 870 MG/DL — SIGNIFICANT CHANGE UP (ref 330–520)
GAS PNL BLDA: SIGNIFICANT CHANGE UP
GAS PNL BLDV: 145 MMOL/L — SIGNIFICANT CHANGE UP (ref 136–145)
GAS PNL BLDV: SIGNIFICANT CHANGE UP
GAS PNL BLDV: SIGNIFICANT CHANGE UP
GLUCOSE BLDC GLUCOMTR-MCNC: 107 MG/DL — HIGH (ref 70–99)
GLUCOSE BLDC GLUCOMTR-MCNC: 110 MG/DL — HIGH (ref 70–99)
GLUCOSE BLDC GLUCOMTR-MCNC: 112 MG/DL — HIGH (ref 70–99)
GLUCOSE BLDC GLUCOMTR-MCNC: 113 MG/DL — HIGH (ref 70–99)
GLUCOSE BLDC GLUCOMTR-MCNC: 115 MG/DL — HIGH (ref 70–99)
GLUCOSE BLDC GLUCOMTR-MCNC: 120 MG/DL — HIGH (ref 70–99)
GLUCOSE BLDC GLUCOMTR-MCNC: 121 MG/DL — HIGH (ref 70–99)
GLUCOSE BLDC GLUCOMTR-MCNC: 121 MG/DL — HIGH (ref 70–99)
GLUCOSE BLDC GLUCOMTR-MCNC: 122 MG/DL — HIGH (ref 70–99)
GLUCOSE BLDC GLUCOMTR-MCNC: 123 MG/DL — HIGH (ref 70–99)
GLUCOSE BLDC GLUCOMTR-MCNC: 133 MG/DL — HIGH (ref 70–99)
GLUCOSE BLDC GLUCOMTR-MCNC: 137 MG/DL — HIGH (ref 70–99)
GLUCOSE BLDC GLUCOMTR-MCNC: 141 MG/DL — HIGH (ref 70–99)
GLUCOSE BLDC GLUCOMTR-MCNC: 143 MG/DL — HIGH (ref 70–99)
GLUCOSE BLDC GLUCOMTR-MCNC: 151 MG/DL — HIGH (ref 70–99)
GLUCOSE BLDC GLUCOMTR-MCNC: 163 MG/DL — HIGH (ref 70–99)
GLUCOSE BLDC GLUCOMTR-MCNC: 164 MG/DL — HIGH (ref 70–99)
GLUCOSE BLDC GLUCOMTR-MCNC: 185 MG/DL — HIGH (ref 70–99)
GLUCOSE BLDC GLUCOMTR-MCNC: 186 MG/DL — HIGH (ref 70–99)
GLUCOSE BLDC GLUCOMTR-MCNC: 192 MG/DL — HIGH (ref 70–99)
GLUCOSE BLDC GLUCOMTR-MCNC: 198 MG/DL — HIGH (ref 70–99)
GLUCOSE BLDC GLUCOMTR-MCNC: 209 MG/DL — HIGH (ref 70–99)
GLUCOSE BLDC GLUCOMTR-MCNC: 97 MG/DL — SIGNIFICANT CHANGE UP (ref 70–99)
GLUCOSE BLDV-MCNC: 129 MG/DL — HIGH (ref 70–99)
GLUCOSE SERPL-MCNC: 131 MG/DL — HIGH (ref 70–99)
GRAM STN FLD: SIGNIFICANT CHANGE UP
GRAM STN FLD: SIGNIFICANT CHANGE UP
HCO3 BLDV-SCNC: 26 MMOL/L — SIGNIFICANT CHANGE UP (ref 22–29)
HCT VFR BLD CALC: 28.4 % — LOW (ref 34.5–45)
HCT VFR BLDA CALC: 32 % — LOW (ref 34.5–46.5)
HGB BLD CALC-MCNC: 10.6 G/DL — LOW (ref 11.7–16.1)
HGB BLD-MCNC: 9.1 G/DL — LOW (ref 11.5–15.5)
HOROWITZ INDEX BLDV+IHG-RTO: 40 — SIGNIFICANT CHANGE UP
INR BLD: 1.66 RATIO — HIGH (ref 0.88–1.16)
LACTATE BLDV-MCNC: 1.6 MMOL/L — SIGNIFICANT CHANGE UP (ref 0.5–2)
MAGNESIUM SERPL-MCNC: 2.4 MG/DL — SIGNIFICANT CHANGE UP (ref 1.6–2.6)
MCHC RBC-ENTMCNC: 24.9 PG — LOW (ref 27–34)
MCHC RBC-ENTMCNC: 32 GM/DL — SIGNIFICANT CHANGE UP (ref 32–36)
MCV RBC AUTO: 77.8 FL — LOW (ref 80–100)
METHOD TYPE: SIGNIFICANT CHANGE UP
METHOD TYPE: SIGNIFICANT CHANGE UP
MRSA SPEC QL CULT: SIGNIFICANT CHANGE UP
NRBC # BLD: 2 /100 WBCS — HIGH (ref 0–0)
ORGANISM # SPEC MICROSCOPIC CNT: SIGNIFICANT CHANGE UP
ORGANISM # SPEC MICROSCOPIC CNT: SIGNIFICANT CHANGE UP
PCO2 BLDV: 33 MMHG — LOW (ref 39–42)
PH BLDV: 7.5 — HIGH (ref 7.32–7.43)
PHOSPHATE SERPL-MCNC: 2.2 MG/DL — LOW (ref 2.5–4.5)
PLATELET # BLD AUTO: 101 K/UL — LOW (ref 150–400)
PO2 BLDV: 53 MMHG — HIGH (ref 25–45)
POTASSIUM BLDV-SCNC: 3.4 MMOL/L — LOW (ref 3.5–5.1)
POTASSIUM SERPL-MCNC: 3.3 MMOL/L — LOW (ref 3.5–5.3)
POTASSIUM SERPL-SCNC: 3.3 MMOL/L — LOW (ref 3.5–5.3)
PROT SERPL-MCNC: 6.5 G/DL — SIGNIFICANT CHANGE UP (ref 6–8.3)
PROTHROM AB SERPL-ACNC: 19.4 SEC — HIGH (ref 10.5–13.4)
RBC # BLD: 3.65 M/UL — LOW (ref 3.8–5.2)
RBC # FLD: 24.6 % — HIGH (ref 10.3–14.5)
RH IG SCN BLD-IMP: POSITIVE — SIGNIFICANT CHANGE UP
SAO2 % BLDV: 87.3 % — SIGNIFICANT CHANGE UP (ref 67–88)
SODIUM SERPL-SCNC: 148 MMOL/L — HIGH (ref 135–145)
SPECIMEN SOURCE: SIGNIFICANT CHANGE UP
SPECIMEN SOURCE: SIGNIFICANT CHANGE UP
VANCOMYCIN TROUGH SERPL-MCNC: 13.9 UG/ML — SIGNIFICANT CHANGE UP (ref 10–20)
WBC # BLD: 17.57 K/UL — HIGH (ref 3.8–10.5)
WBC # FLD AUTO: 17.57 K/UL — HIGH (ref 3.8–10.5)

## 2022-09-30 PROCEDURE — 99291 CRITICAL CARE FIRST HOUR: CPT

## 2022-09-30 PROCEDURE — 93971 EXTREMITY STUDY: CPT | Mod: 26,RT

## 2022-09-30 PROCEDURE — 99232 SBSQ HOSP IP/OBS MODERATE 35: CPT

## 2022-09-30 PROCEDURE — 71045 X-RAY EXAM CHEST 1 VIEW: CPT | Mod: 26

## 2022-09-30 RX ORDER — POTASSIUM CHLORIDE 20 MEQ
10 PACKET (EA) ORAL
Refills: 0 | Status: COMPLETED | OUTPATIENT
Start: 2022-09-30 | End: 2022-09-30

## 2022-09-30 RX ORDER — AMIODARONE HYDROCHLORIDE 400 MG/1
150 TABLET ORAL ONCE
Refills: 0 | Status: COMPLETED | OUTPATIENT
Start: 2022-09-30 | End: 2022-09-30

## 2022-09-30 RX ORDER — ALBUMIN HUMAN 25 %
50 VIAL (ML) INTRAVENOUS
Refills: 0 | Status: COMPLETED | OUTPATIENT
Start: 2022-09-30 | End: 2022-09-30

## 2022-09-30 RX ORDER — POTASSIUM CHLORIDE 20 MEQ
40 PACKET (EA) ORAL ONCE
Refills: 0 | Status: COMPLETED | OUTPATIENT
Start: 2022-09-30 | End: 2022-09-30

## 2022-09-30 RX ORDER — POTASSIUM CHLORIDE 20 MEQ
20 PACKET (EA) ORAL ONCE
Refills: 0 | Status: COMPLETED | OUTPATIENT
Start: 2022-09-30 | End: 2022-09-30

## 2022-09-30 RX ORDER — MAGNESIUM SULFATE 500 MG/ML
1 VIAL (ML) INJECTION ONCE
Refills: 0 | Status: COMPLETED | OUTPATIENT
Start: 2022-09-30 | End: 2022-09-30

## 2022-09-30 RX ORDER — HYDROCORTISONE 20 MG
25 TABLET ORAL EVERY 8 HOURS
Refills: 0 | Status: DISCONTINUED | OUTPATIENT
Start: 2022-09-30 | End: 2022-10-01

## 2022-09-30 RX ORDER — ACETAMINOPHEN 500 MG
650 TABLET ORAL EVERY 6 HOURS
Refills: 0 | Status: DISCONTINUED | OUTPATIENT
Start: 2022-09-30 | End: 2022-10-01

## 2022-09-30 RX ORDER — POTASSIUM CHLORIDE 20 MEQ
10 PACKET (EA) ORAL ONCE
Refills: 0 | Status: COMPLETED | OUTPATIENT
Start: 2022-09-30 | End: 2022-09-30

## 2022-09-30 RX ORDER — DEXMEDETOMIDINE HYDROCHLORIDE IN 0.9% SODIUM CHLORIDE 4 UG/ML
0.5 INJECTION INTRAVENOUS
Qty: 200 | Refills: 0 | Status: DISCONTINUED | OUTPATIENT
Start: 2022-09-30 | End: 2022-10-02

## 2022-09-30 RX ORDER — MAGNESIUM SULFATE 500 MG/ML
2 VIAL (ML) INJECTION ONCE
Refills: 0 | Status: COMPLETED | OUTPATIENT
Start: 2022-09-30 | End: 2022-09-30

## 2022-09-30 RX ADMIN — INSULIN HUMAN 3 UNIT(S)/HR: 100 INJECTION, SOLUTION SUBCUTANEOUS at 18:53

## 2022-09-30 RX ADMIN — Medication 500000 UNIT(S): at 12:21

## 2022-09-30 RX ADMIN — Medication 1 TABLET(S): at 12:21

## 2022-09-30 RX ADMIN — Medication 50 MILLIEQUIVALENT(S): at 20:17

## 2022-09-30 RX ADMIN — MEROPENEM 100 MILLIGRAM(S): 1 INJECTION INTRAVENOUS at 06:56

## 2022-09-30 RX ADMIN — MEXILETINE HYDROCHLORIDE 200 MILLIGRAM(S): 150 CAPSULE ORAL at 14:17

## 2022-09-30 RX ADMIN — Medication 50 MILLILITER(S): at 23:47

## 2022-09-30 RX ADMIN — Medication 0.5 MILLIGRAM(S): at 05:47

## 2022-09-30 RX ADMIN — INSULIN HUMAN 3 UNIT(S)/HR: 100 INJECTION, SOLUTION SUBCUTANEOUS at 19:34

## 2022-09-30 RX ADMIN — Medication 50 MILLIEQUIVALENT(S): at 19:45

## 2022-09-30 RX ADMIN — Medication 50 MILLIEQUIVALENT(S): at 14:18

## 2022-09-30 RX ADMIN — Medication 40 MILLIEQUIVALENT(S): at 00:46

## 2022-09-30 RX ADMIN — Medication 5.08 MICROGRAM(S)/KG/MIN: at 19:34

## 2022-09-30 RX ADMIN — Medication 50 MILLIEQUIVALENT(S): at 21:01

## 2022-09-30 RX ADMIN — MEROPENEM 100 MILLIGRAM(S): 1 INJECTION INTRAVENOUS at 17:49

## 2022-09-30 RX ADMIN — Medication 25 MILLIGRAM(S): at 21:05

## 2022-09-30 RX ADMIN — NICARDIPINE HYDROCHLORIDE 25 MG/HR: 30 CAPSULE, EXTENDED RELEASE ORAL at 19:34

## 2022-09-30 RX ADMIN — AMIODARONE HYDROCHLORIDE 600 MILLIGRAM(S): 400 TABLET ORAL at 19:05

## 2022-09-30 RX ADMIN — Medication 500000 UNIT(S): at 06:34

## 2022-09-30 RX ADMIN — Medication 100 GRAM(S): at 12:44

## 2022-09-30 RX ADMIN — Medication 50 MILLIGRAM(S): at 06:56

## 2022-09-30 RX ADMIN — Medication 100 GRAM(S): at 19:45

## 2022-09-30 RX ADMIN — Medication 50 MILLIEQUIVALENT(S): at 12:22

## 2022-09-30 RX ADMIN — MEXILETINE HYDROCHLORIDE 200 MILLIGRAM(S): 150 CAPSULE ORAL at 06:56

## 2022-09-30 RX ADMIN — Medication 63.75 MILLIMOLE(S): at 03:30

## 2022-09-30 RX ADMIN — Medication 3 MILLILITER(S): at 11:21

## 2022-09-30 RX ADMIN — SODIUM CHLORIDE 50 MILLILITER(S): 9 INJECTION INTRAMUSCULAR; INTRAVENOUS; SUBCUTANEOUS at 19:34

## 2022-09-30 RX ADMIN — MEXILETINE HYDROCHLORIDE 200 MILLIGRAM(S): 150 CAPSULE ORAL at 21:01

## 2022-09-30 RX ADMIN — MONTELUKAST 10 MILLIGRAM(S): 4 TABLET, CHEWABLE ORAL at 21:02

## 2022-09-30 RX ADMIN — Medication 25 MILLIGRAM(S): at 14:18

## 2022-09-30 RX ADMIN — DEXMEDETOMIDINE HYDROCHLORIDE IN 0.9% SODIUM CHLORIDE 8.46 MICROGRAM(S)/KG/HR: 4 INJECTION INTRAVENOUS at 18:53

## 2022-09-30 RX ADMIN — NICARDIPINE HYDROCHLORIDE 25 MG/HR: 30 CAPSULE, EXTENDED RELEASE ORAL at 18:52

## 2022-09-30 RX ADMIN — Medication 3 MILLILITER(S): at 17:05

## 2022-09-30 RX ADMIN — Medication 650 MILLIGRAM(S): at 15:33

## 2022-09-30 RX ADMIN — Medication 50 MILLIEQUIVALENT(S): at 00:30

## 2022-09-30 RX ADMIN — Medication 50 MILLILITER(S): at 23:48

## 2022-09-30 RX ADMIN — CHLORHEXIDINE GLUCONATE 1 APPLICATION(S): 213 SOLUTION TOPICAL at 05:03

## 2022-09-30 RX ADMIN — Medication 500000 UNIT(S): at 17:50

## 2022-09-30 RX ADMIN — Medication 3 MILLILITER(S): at 23:34

## 2022-09-30 RX ADMIN — Medication 0.5 MILLIGRAM(S): at 17:05

## 2022-09-30 RX ADMIN — AMIODARONE HYDROCHLORIDE 600 MILLIGRAM(S): 400 TABLET ORAL at 19:45

## 2022-09-30 RX ADMIN — DEXMEDETOMIDINE HYDROCHLORIDE IN 0.9% SODIUM CHLORIDE 8.46 MICROGRAM(S)/KG/HR: 4 INJECTION INTRAVENOUS at 19:34

## 2022-09-30 RX ADMIN — PANTOPRAZOLE SODIUM 40 MILLIGRAM(S): 20 TABLET, DELAYED RELEASE ORAL at 12:21

## 2022-09-30 RX ADMIN — Medication 20 MILLIEQUIVALENT(S): at 12:45

## 2022-09-30 RX ADMIN — Medication 1 APPLICATION(S): at 14:07

## 2022-09-30 RX ADMIN — VASOPRESSIN 6 UNIT(S)/MIN: 20 INJECTION INTRAVENOUS at 21:25

## 2022-09-30 RX ADMIN — Medication 250 MILLIGRAM(S): at 12:22

## 2022-09-30 RX ADMIN — Medication 3 MILLILITER(S): at 05:47

## 2022-09-30 NOTE — PROGRESS NOTE ADULT - SUBJECTIVE AND OBJECTIVE BOX
HPI:  71F PMH DM, COPD, Chronic Adrenal Insufficiency on Chronic prednisone, history of colorectal cancer s/p resection (colostomy bag), Hx of CAD, Chronic A fib on Eliquis, and tracheomalacia and multiple intubations, recent dx of OM presented to ED at [facility] this morning with epigastric pain, belching and central chest pain. Found on CTA to have type A aortic dissection and transferred to [facility] for surgical evaluation by Dr. Cabrera. (06 Sep 2022 16:32)      Patient seen and examined at the bedside.    Remained critically ill on continuous ICU monitoring.    OBJECTIVE:  Vital Signs Last 24 Hrs  T(C): 38 (30 Sep 2022 08:00), Max: 38 (30 Sep 2022 04:00)  T(F): 100.4 (30 Sep 2022 08:00), Max: 100.4 (30 Sep 2022 04:00)  HR: 105 (30 Sep 2022 11:17) (96 - 117)  BP: --  BP(mean): --  RR: 20 (30 Sep 2022 10:00) (15 - 31)  SpO2: 100% (30 Sep 2022 11:17) (98% - 100%)    Parameters below as of 30 Sep 2022 11:17  Patient On (Oxygen Delivery Method): ventilator        Physical Exam:   General: intubated,  Neurology: nonfocal, moves extremities not on command   ENT/Neck: Neck supple, trachea midline, No JVD  Respiratory: rhonchi throughout    CV: S1S2, no murmurs        [x] Sinus Tachy  Abdominal: Soft, NT, ND, colostomy in place  Extremities: trace pedal edema noted, + peripheral pulses   Skin: Dry dressing over right heel, no Rashes, Hematoma, Ecchymosis                Assessment:  71F PMH DM, COPD, Chronic Adrenal Insufficiency on Chronic prednisone, history of colorectal cancer s/p resection (colostomy bag), Hx of CAD, Chronic A fib on Eliquis, and tracheomalacia and multiple intubations, recent dx of OM presented to ED at [facility] this morning with epigastric pain, belching and central chest pain. Found on CTA to have type A aortic dissection and transferred to [facility] for surgical evaluation by Dr. Cabrera.     Ascending aortic dissection s/p modified bentall and hemiarch on 9/6   Hypovolemic shock   Post op respiratory insufficiency  Acute blood loss anemia  Stress hyperglycemia   RIJ thrombus  Pancreatic cyst    Proteus PNA  Leukocytosis      Plan:   ***Neuro***  Continue close monitoring of neurological status     ****Cardiovascular***  9/27: Patient returned to ICU for presumed sepsis, presenting with tachypnea, fevers, tachycardia. CT Chest on 9/12:  Status post recent ascending aortic dissection repair. Small amount of fluid surrounding the ascending aorta without evidence of enhancing wall to suggest abscess. RUE duplex on 9/12: There is a thrombosed and occluded RIJ. 71 year old female with Ascending aortic dissection status post modified bentall hemiarch 9/6/2022.  Inotropic support with IV Dobutamine infusion for systolic heart failure. IV Vasopressin is currently off. Invasive hemodynamic monitoring with a central venous catheter & an A-line were required for the continuous central venous and MAP/BP monitoring to ensure adequate cardiovascular support. IV Cardene infusion for hypertension. Continue with PO Mexiletine for rate control        ***Pulmonary***  CT Chest on 9/12: Bilateral lower lobe atelectasis with bilateral pleural effusions. CTA 9/28: RLL consolidation. Reintubated for change in mental status on 9/8, self extubated on 9/9 and reintubated again, extubated to HFO2 on 9/13. 9/27 ReIntubated for airway protection (high peak/plateau pressures on volume control. Switched to Pressure control.) ARDS ? Respiratory status required full ventilatory support, close monitoring of respiratory rate and breathing pattern, the following of ABG’s with A-line monitoring, continuous pulse oximetry monitoring. Respiratory status required supplemental oxygen with Mar at 10ppm, close monitoring of breathing pattern and respiratory rate & the following of continuous pulse oximetry for support & to prevent decompensation. Continue with Bronchodilators       Mode: AC/ CMV (Assist Control/ Continuous Mandatory Ventilation)  RR (machine): 16  FiO2: 40  PEEP: 8  ITime: 1  MAP: 12  PC: 18  PIP: 26              ***Heme***  Thrombocytopenia and Anemia due to acute blood loss, no current plan for transfusion. Continue to monitor hemoglobin and hematocrit levels     ***GI***  CT A/P on 9/12: Mild thickening of the cecum and ascending colon possibly representing mild nonspecific proximal colitis. Hepatic cirrhosis. The focal IPMN of the pancreas with large cyst within the tail the pancreas measuring 3.1 cm.Colostomy bag in place, continue to monitor output. Passed FEES on 9/20, CC diet with reg liquids, pt with decreased appetite need to encourage PO intake. Protonix for stress ulcer prophylaxis. Tolerating PO consistent carb diet as well as tolerating TFs Glucerna, at goal 40cc/hr.         ***Renal***  Optimize renal perfusion with adequate volume resuscitation and continued monitoring of urine output, fluid balance, electrolytes, and BUN/Creatinine.     ***ID***  Afebrile, white blood cells elevated to 17.57. Continue trending white blood count and monitoring fever curve.SCx on 9/8 +Proteus mirabilis, on 9/27 +Staphylococcus aureus on 9/9+ LE, WBC 60, few yeast  /  UCx on 9/10 NG. BCx on 9/10 NGTD, BCx on 9/12 NGTD 9/16 bcx NGTD, 9/20 NGTD, 9/27 NGTD, 9/28 NGTD. Nystatin for thrush. Broad spectrum antibiotics (meropenem and vancomycin started for presumed sepsis) 9/27      ***Endocrine***  Metabolic stability, history of diabetes mellitus required an IV regular Insulin drip while following serial glucose levels to help achieve and maintain euglycemia.            Patient requires continuous monitoring with bedside rhythm monitoring, pulse oximetry monitoring, and continuous central venous and arterial pressure monitoring; and intermittent blood gas analysis. Care plan discussed with the ICU care team.   Patient remained critical, at risk for life threatening decompensation.    I have spent 30 minutes providing critical care management to this patient.    By signing my name below, I, Kieran Plascencia, attest that this documentation has been prepared under the direction and in the presence of Marbella Martin MD   Electronically signed: Georgi Cordero, 09-30-22 @ 11:45    I, Marbella Martin, personally performed the services described in this documentation. all medical record entries made by the marcyibronaldo were at my direction and in my presence. I have reviewed the chart and agree that the record reflects my personal performance and is accurate and complete  Electronically signed: Marbella Martin MD  HPI:  71F PMH DM, COPD, Chronic Adrenal Insufficiency on Chronic prednisone, history of colorectal cancer s/p resection (colostomy bag), Hx of CAD, Chronic A fib on Eliquis, and tracheomalacia and multiple intubations, recent dx of OM presented to ED at [facility] this morning with epigastric pain, belching and central chest pain. Found on CTA to have type A aortic dissection and transferred to [facility] for surgical evaluation by Dr. Cabrera. (06 Sep 2022 16:32)      Patient seen and examined at the bedside.    Remained critically ill on continuous ICU monitoring.    OBJECTIVE:  Vital Signs Last 24 Hrs  T(C): 38 (30 Sep 2022 08:00), Max: 38 (30 Sep 2022 04:00)  T(F): 100.4 (30 Sep 2022 08:00), Max: 100.4 (30 Sep 2022 04:00)  HR: 105 (30 Sep 2022 11:17) (96 - 117)  BP: --  BP(mean): --  RR: 20 (30 Sep 2022 10:00) (15 - 31)  SpO2: 100% (30 Sep 2022 11:17) (98% - 100%)    Parameters below as of 30 Sep 2022 11:17  Patient On (Oxygen Delivery Method): ventilator        Physical Exam:   General: intubated  Neurology: somnolent, but able to follow simple commands  ENT/Neck: Neck supple, trachea midline, No JVD  Respiratory: rhonchi throughout    CV: S1S2, no murmurs        [x] Sinus Tachy  Abdominal: Soft, NT, ND, colostomy in place  Extremities: +4 RUE edema, 2 + pedal edema noted, + peripheral pulses   Skin: Dry dressing over right heel, no Rashes, Hematoma, Ecchymosis                Assessment:  71F PMH DM, COPD, Chronic Adrenal Insufficiency on Chronic prednisone, history of colorectal cancer s/p resection (colostomy bag), Hx of CAD, Chronic A fib on Eliquis, and tracheomalacia and multiple intubations, recent dx of OM presented to ED at [facility] this morning with epigastric pain, belching and central chest pain. Found on CTA to have type A aortic dissection and transferred to [facility] for surgical evaluation by Dr. Cabrera.     Ascending aortic dissection s/p modified bentall and hemiarch on 9/6   Hypovolemic shock   Post op respiratory insufficiency  Acute blood loss anemia  Stress hyperglycemia   RIJ thrombus  Pancreatic cyst    Proteus PNA  Leukocytosis      Plan:   ***Neuro***  Continue close monitoring of neurological status     ****Cardiovascular***  71 year old female with Ascending aortic dissection status post modified bentall hemiarch 9/6/2022. 9/27: Patient returned to ICU for presumed sepsis, presenting with tachypnea, fevers, tachycardia. CT Chest on 9/12:  Status post recent ascending aortic dissection repair. Small amount of fluid surrounding the ascending aorta without evidence of enhancing wall to suggest abscess. RUE duplex on 9/12: There is a thrombosed and occluded RIJ. Patient is requiring inotropic support with an IV Dobutamine infusion for acute biventricular systolic heart failure, dose decreased today. IV Nicardipine titrated for hypertension. Invasive hemodynamic monitoring with a central venous catheter & an A-line were required for the continuous central venous and MAP/BP monitoring to ensure adequate cardiovascular support. IV Cardene infusion for hypertension. Continue with PO Mexiletine for rate control        ***Pulmonary***  CT Chest on 9/12: Bilateral lower lobe atelectasis with bilateral pleural effusions. CTA 9/28: RLL consolidation. Reintubated for change in mental status on 9/8, self extubated on 9/9 and reintubated again, extubated to HFO2 on 9/13. 9/27 ReIntubated for airway protection (high peak/plateau pressures on volume control. Switched to Pressure control.) ARDS ? Respiratory status required full ventilatory support, close monitoring of respiratory rate and breathing pattern, the following of ABG’s with A-line monitoring, continuous pulse oximetry monitoring. Respiratory status required pressure control ventilation with Mar at 10ppm, close monitoring of breathing pattern and respiratory rate & the following of continuous pulse oximetry for support & to prevent decompensation. Continue with bronchodilators. Patient may ultimately require tracheostomy due to overall deconditioned state and recurrent respiratory failure.       Mode: PC  RR (machine): 16  FiO2: 40  PEEP: 8  ITime: 1  MAP: 12  PC: 18  PIP: 26              ***Heme***  Anemia due to acute blood loss, no current plan for transfusion. Continue to monitor hemoglobin and hematocrit levels     ***GI***  CT A/P on 9/12: Mild thickening of the cecum and ascending colon possibly representing mild nonspecific proximal colitis. Hepatic cirrhosis. The focal IPMN of the pancreas with large cyst within the tail the pancreas measuring 3.1 cm. Colostomy bag in place, continue to monitor output. Protonix for stress ulcer prophylaxis. Tolerating enteral feeding, Glucerna, at goal 40cc/hr.       ***Renal***  Resolving ROBI, creatinine peaked at 1.7, now improved to 1.01, non oliguric. Optimize renal perfusion with adequate inotropic support and blood pressure and continued monitoring of urine output, fluid balance, electrolytes, and BUN/Creatinine.     ***ID***  Afebrile, white blood cells elevated to 17.57. Continue trending white blood count and monitoring fever curve. SCx on 9/8 +Proteus mirabilis, on 9/27 +Staphylococcus aureus on 9/9+ LE, WBC 60, few yeast  /  UCx on 9/10 NG. BCx on 9/10 NGTD, BCx on 9/12 NGTD 9/16 bcx NGTD, 9/20 NGTD, 9/27 NGTD, 9/28 NGTD. Nystatin for thrush. Broad spectrum antibiotics (meropenem and vancomycin started for presumed sepsis) 9/27. Patient remains febrile and additional cultures sent today.      ***Endocrine***  Metabolic stability, history of diabetes mellitus required an IV regular Insulin drip while following serial glucose levels to help achieve and maintain euglycemia. Patient has a history of adrenal insufficiency, received stress steroids during recent decompensation. Now tapering steroids.          Patient requires continuous monitoring with bedside rhythm monitoring, pulse oximetry monitoring, and continuous central venous and arterial pressure monitoring; and intermittent blood gas analysis. Care plan discussed with the ICU care team.   Patient remained critical, at risk for life threatening decompensation.    I have spent 35 minutes providing critical care management to this patient.    By signing my name below, I, Kieran Plascencia, attest that this documentation has been prepared under the direction and in the presence of Marbella Martin MD   Electronically signed: Georgi Cordero, 09-30-22 @ 11:45    I, Marbella Martin, personally performed the services described in this documentation. all medical record entries made by the marcyibronaldo were at my direction and in my presence. I have reviewed the chart and agree that the record reflects my personal performance and is accurate and complete  Electronically signed: Marbella Martin MD

## 2022-09-30 NOTE — PROGRESS NOTE ADULT - PROBLEM SELECTOR PLAN 1
-test BG hourly while on insulin gtt. Once off gtt can check q6h if NPO/TF and AC/HS/2AM daily once eating again  -C/w Insulin drip to BG goal 100 to 180s.   -Discontinue TFs/PO diet order. Still active  -Contact endo team once pt is more stable to restart SQ insulin again  Discharge plan:  - Likely to discharge patient home on basal/bolus insulin. Final regimen pending clinical course.  - Recommend routine outpatient ophthalmology, podiatry  - Can f/u with endocrinologist Dr. Saavedra.  - Make sure pt has Rx for all DM supplies and insulin/ DM meds.  -Based on pt's clinical condition and mental status at this time she is not able to independently manage her DM. Will evaluate pt's ability to manage DM at time of discharge. If unable> pt will need family/ care giver (s) to manage DM care at home  -Will need rehab.

## 2022-09-30 NOTE — PROGRESS NOTE ADULT - SUBJECTIVE AND OBJECTIVE BOX
DIABETES FOLLOW UP NOTE: Saw pt earlier today    Chief Complaint: Endocrine consult requested for management of T2DM    INTERVAL HX: Saw pt in CTU, intubated, now off pressors and on TFs of Glucerna at 40cc/hr. BG mostly at goal while on insulin drip requiring average of  3 to 5 units/hr. No hypoglycemia. On antibiotics for sepsis with + transaminitis. Tapering steroid doses  down as pt is now off pressors and more stable.       Review of Systems:  General: As above. Unable  Opened eyes when called but closed them right back       Allergies    aspirin (Short breath)  Avelox (Short breath; Pruritus)  cefepime (Anaphylaxis)  codeine (Short breath)  Dilaudid (Short breath)  iodine (Short breath; Swelling)  penicillin (Anaphylaxis)  shellfish (Anaphylaxis)  tetanus toxoid (Short breath)  Valium (Short breath)    Intolerances      MEDICATIONS:  hydrocortisone sodium succinate Injectable 25 milliGRAM(s) IV Push every 8 hours  insulin regular Infusion 3 Unit(s)/Hr (3 mL/Hr) IV Continuous <Continuous>  meropenem  IVPB 1000 milliGRAM(s) IV Intermittent every 12 hours  vancomycin  IVPB 1000 milliGRAM(s) IV Intermittent every 24 hours  vasopressin Infusion 0.04 Unit(s)/Min (6 mL/Hr) IV Continuous <Continuous>OFF      PHYSICAL EXAM:  VITALS: T(C): 39.8 (09-30-22 @ 16:00)  T(F): 103.6 (09-30-22 @ 16:00), Max: 103.6 (09-30-22 @ 16:00)  HR: 105 (09-30-22 @ 17:26) (87 - 116)  BP: --  RR:  (17 - 44)  SpO2:  (98% - 100%)  Wt(kg): --  GENERAL: Female laying in bed in NAD.  HEENT: Intubated  Abdomen: Soft, nontender, non distended  Extremities: Warm, venodynes on.   NEURO: Alert but unable to participate in encounter      LABS:  POCT Blood Glucose.: 115 mg/dL (09-30-22 @ 17:13)  POCT Blood Glucose.: 143 mg/dL (09-30-22 @ 16:08)  POCT Blood Glucose.: 137 mg/dL (09-30-22 @ 15:15)  POCT Blood Glucose.: 151 mg/dL (09-30-22 @ 14:22)  POCT Blood Glucose.: 164 mg/dL (09-30-22 @ 13:08)  POCT Blood Glucose.: 141 mg/dL (09-30-22 @ 12:11)  POCT Blood Glucose.: 121 mg/dL (09-30-22 @ 09:56)  POCT Blood Glucose.: 113 mg/dL (09-30-22 @ 08:58)  POCT Blood Glucose.: 107 mg/dL (09-30-22 @ 07:59)  POCT Blood Glucose.: 110 mg/dL (09-30-22 @ 06:46)  POCT Blood Glucose.: 123 mg/dL (09-30-22 @ 06:06)  POCT Blood Glucose.: 186 mg/dL (09-30-22 @ 04:54)  POCT Blood Glucose.: 198 mg/dL (09-30-22 @ 04:14)  POCT Blood Glucose.: 185 mg/dL (09-30-22 @ 02:47)  POCT Blood Glucose.: 209 mg/dL (09-30-22 @ 01:55)  POCT Blood Glucose.: 164 mg/dL (09-30-22 @ 00:54)  POCT Blood Glucose.: 120 mg/dL (09-29-22 @ 23:53)  POCT Blood Glucose.: 97 mg/dL (09-29-22 @ 23:13)  POCT Blood Glucose.: 91 mg/dL (09-29-22 @ 22:48)  POCT Blood Glucose.: 112 mg/dL (09-29-22 @ 21:51)  POCT Blood Glucose.: 115 mg/dL (09-29-22 @ 21:05)  POCT Blood Glucose.: 136 mg/dL (09-29-22 @ 19:57)  POCT Blood Glucose.: 161 mg/dL (09-29-22 @ 18:51)  POCT Blood Glucose.: 209 mg/dL (09-29-22 @ 17:55)  POCT Blood Glucose.: 181 mg/dL (09-29-22 @ 16:57)  POCT Blood Glucose.: 141 mg/dL (09-29-22 @ 15:04)  POCT Blood Glucose.: 136 mg/dL (09-29-22 @ 14:10)  POCT Blood Glucose.: 119 mg/dL (09-29-22 @ 12:04)  POCT Blood Glucose.: 111 mg/dL (09-29-22 @ 11:01)  POCT Blood Glucose.: 103 mg/dL (09-29-22 @ 10:07)  POCT Blood Glucose.: 95 mg/dL (09-29-22 @ 09:40)  POCT Blood Glucose.: 96 mg/dL (09-29-22 @ 09:02)  POCT Blood Glucose.: 104 mg/dL (09-29-22 @ 08:09)  POCT Blood Glucose.: 124 mg/dL (09-29-22 @ 06:45)  POCT Blood Glucose.: 127 mg/dL (09-29-22 @ 05:49)  POCT Blood Glucose.: 134 mg/dL (09-29-22 @ 04:48)  POCT Blood Glucose.: 130 mg/dL (09-29-22 @ 03:53)  POCT Blood Glucose.: 140 mg/dL (09-29-22 @ 02:59)  POCT Blood Glucose.: 162 mg/dL (09-29-22 @ 01:53)  POCT Blood Glucose.: 171 mg/dL (09-29-22 @ 00:56)                            9.1    17.57 )-----------( 101      ( 30 Sep 2022 00:25 )             28.4       09-30    148<H>  |  111<H>  |  47<H>  ----------------------------<  131<H>  3.3<L>   |  23  |  1.01    eGFR: 58<L>    Ca    9.5      09-30  Mg     2.4     09-30  Phos  2.2     09-30    TPro  6.5  /  Alb  3.6  /  TBili  2.0<H>  /  DBili  x   /  AST  734<H>  /  ALT  1742<H>  /  AlkPhos  164<H>  09-30      Thyroid Function Tests:  09-06 @ 16:46 TSH 3.30 FreeT4 -- T3 -- Anti TPO -- Anti Thyroglobulin Ab -- TSI --      A1C with Estimated Average Glucose Result: 9.4 % (09-06-22 @ 16:46)  A1C with Estimated Average Glucose Result: 9.2 % (07-11-22 @ 07:36)      Estimated Average Glucose: 223 mg/dL (09-06-22 @ 16:46)  Estimated Average Glucose: 217 mg/dL (07-11-22 @ 07:36)

## 2022-09-30 NOTE — PROGRESS NOTE ADULT - TIME BILLING
as above: no signif changes--remains in resp failure-sepsis physiology-inotropes/pressors/vented/ABX-improving LFTs  multifactorial dyspnea-resp failure-severe persistent asthma, TBM s/p tracheoplasty, s/p Aortic aneurysm repair, Bronchitis (proteus)-O2-keep 90%; wean as able once HD stable  severe persistent asthma--medrol 8mg changed to hydrocortisone 100 q 8, singulair 10, duoneb q 6, budes .5 bid, tezspire 8/29-next 9/29  TBM-s/p tracheoplasty--accapella, unable to use vest due to surgery  s/p Aortic aneurysm repair--as per [facility] staff care  AF-on heparin--eventual eliquis rx               CV-mult pressors/inotropes/cardene-MAP above 60  DVT R-IJ-on heparin rx  ID-s/p proteus bronchitis-s/p meropenum changed to ceftriaxone and back to meropenem/vanco- off of ABX as of 9/19--restart of ABX 9/27-meropenem/vanco (check all cx)  Presumed Sepsis.  PT-OOB as able (on hold)                              GI-TF as able--f/up LFTs       VC dysfunction--aspiration precautions-sp and sw evaln--NGT in place/TF; FEESST passed 9/20  GO                                    Heme onc f/up colon ca  prog--critical in CTU                PT-when/if improved    Eulalio Allison MD-Pulmonary   [phone]

## 2022-09-30 NOTE — PROGRESS NOTE ADULT - PROBLEM SELECTOR PLAN 2
On chronic steroids at home: medrol 8mg qd   Pt stable, off pressor so follow recs of Dr Stevenson endocrine attg  as follows  9/30 HCT 25 mg q8h x24 hours   10/1 HCT 20 mg at 8am, HCT 10mg  at 1600  10/2 HCT 10mg at 8 am, HCT 10mg at 1600  10/3 Prednisone 8mg qd

## 2022-09-30 NOTE — PROGRESS NOTE ADULT - SUBJECTIVE AND OBJECTIVE BOX
CHIEF COMPLAINT: f/up sob, chronic resp failure, TBM, severe persistent asthma, VC dysfunction, Type A aortic dissection s/p repair w/modified "Bentall procedure and hemiarch replacement -vented and sedated on nitrous-40%-more responsive    Interval Events: on cardene//vaso    REVIEW OF SYSTEMS:  Constitutional: No fevers or chills. No weight loss. No fatigue or generalized malaise.  Eyes: No itching or discharge from the eyes  ENT: No ear pain. No ear discharge. No nasal congestion. No post nasal drip. No epistaxis. No throat pain. No sore throat. No difficulty swallowing.   CV: No chest pain. No palpitations. No lightheadedness or dizziness.   Resp: No dyspnea at rest. No dyspnea on exertion. No orthopnea. No wheezing. No cough. No stridor. No sputum production. No chest pain with respiration.  GI: No nausea. No vomiting. No diarrhea.  MSK: No joint pain or pain in any extremities  Integumentary: No skin lesions. No pedal edema.  Neurological: No gross motor weakness. No sensory changes.  [ ] All other systems negative  [+ ] Unable to assess ROS because __on vent /semi sedated______    OBJECTIVE:  ICU Vital Signs Last 24 Hrs  T(C): 38 (30 Sep 2022 04:00), Max: 38 (30 Sep 2022 04:00)  T(F): 100.4 (30 Sep 2022 04:00), Max: 100.4 (30 Sep 2022 04:00)  HR: 102 (30 Sep 2022 05:14) (91 - 117)  BP: 137/64 (29 Sep 2022 06:30) (137/64 - 151/67)  BP(mean): 92 (29 Sep 2022 06:30) (92 - 96)  ABP: 137/48 (30 Sep 2022 05:00) (107/45 - 165/65)  ABP(mean): 74 (30 Sep 2022 05:00) (64 - 99)  RR: 19 (30 Sep 2022 05:00) (10 - 31)  SpO2: 98% (30 Sep 2022 05:14) (98% - 100%)    O2 Parameters below as of 30 Sep 2022 05:14  Patient On (Oxygen Delivery Method): ventilator          Mode: AC/ CMV (Assist Control/ Continuous Mandatory Ventilation), RR (machine): 16, FiO2: 40, PEEP: 8, ITime: 1, MAP: 12, PC: 18, PIP: 25     @ 07: @ 07:00  --------------------------------------------------------  IN: 2342.9 mL / OUT: 1965 mL / NET: 377.9 mL     @ 07:  -   @ 05:21  --------------------------------------------------------  IN: 1789.2 mL / OUT: 2110 mL / NET: -320.8 mL      CAPILLARY BLOOD GLUCOSE  185 (30 Sep 2022 05:00)      POCT Blood Glucose.: 186 mg/dL (30 Sep 2022 04:54)      PHYSICAL EXAM:  General: semi-sedated  HEENT: Atraumatic, normocephalic.                 Mallampatti Grade 3                No nasal congestion.                No tonsillar or pharyngeal exudates.  Lymph Nodes: No palpable lymphadenopathy  Neck: No JVD. No carotid bruit.   Respiratory: Normal chest expansion                         Normal percussion                         Normal and equal air entry                         No wheeze, rhonchi or rales.  Cardiovascular: S1 S2 normal. No murmurs, rubs or gallops.   Abdomen: Soft, non-tender, non-distended. No organomegaly. Normoactive bowel sounds.  Extremities: Warm to touch. Peripheral pulse palpable. No pedal edema.   Skin: No rashes or skin lesions  Neurological: Motor and sensory examination unable.  Psychiatry: unable    HOSPITAL MEDICATIONS:  MEDICATIONS  (STANDING):  albuterol/ipratropium for Nebulization 3 milliLiter(s) Nebulizer every 6 hours  buDESOnide    Inhalation Suspension 0.5 milliGRAM(s) Inhalation every 12 hours  chlorhexidine 2% Cloths 1 Application(s) Topical <User Schedule>  collagenase Ointment 1 Application(s) Topical daily  DOBUTamine Infusion 5 MICROgram(s)/kG/Min (10.2 mL/Hr) IV Continuous <Continuous>  hydrocortisone sodium succinate Injectable 50 milliGRAM(s) IV Push every 8 hours  insulin regular Infusion 3 Unit(s)/Hr (3 mL/Hr) IV Continuous <Continuous>  meropenem  IVPB 1000 milliGRAM(s) IV Intermittent every 12 hours  meropenem  IVPB      mexiletine 200 milliGRAM(s) Oral every 8 hours  montelukast 10 milliGRAM(s) Oral at bedtime  multivitamin 1 Tablet(s) Oral daily  niCARdipine Infusion 5 mG/Hr (25 mL/Hr) IV Continuous <Continuous>  nystatin    Suspension 271616 Unit(s) Oral every 6 hours  pantoprazole  Injectable 40 milliGRAM(s) IV Push daily  sodium chloride 0.9%. 1000 milliLiter(s) (50 mL/Hr) IV Continuous <Continuous>  vancomycin  IVPB 1000 milliGRAM(s) IV Intermittent every 24 hours  vasopressin Infusion 0.04 Unit(s)/Min (6 mL/Hr) IV Continuous <Continuous>    MEDICATIONS  (PRN):  simethicone drops 80 milliGRAM(s) Oral every 12 hours PRN Gas      LABS:                        9.1    17.57 )-----------( 101      ( 30 Sep 2022 00:25 )             28.4         148<H>  |  111<H>  |  47<H>  ----------------------------<  131<H>  3.3<L>   |  23  |  1.01    Ca    9.5      30 Sep 2022 00:24  Phos  2.2       Mg     2.4         TPro  6.5  /  Alb  3.6  /  TBili  2.0<H>  /  DBili  x   /  AST  734<H>  /  ALT  1742<H>  /  AlkPhos  164<H>      PT/INR - ( 30 Sep 2022 00:24 )   PT: 19.4 sec;   INR: 1.66 ratio         PTT - ( 30 Sep 2022 00:24 )  PTT:29.0 sec    Arterial Blood Gas:   @ 00:00  7.52/31/185/25/98.9/2.7  ABG lactate: --  Arterial Blood Gas:   @ 17:00  7.50/31/110/24/98.1/1.3  ABG lactate: --  Arterial Blood Gas:   @ 15:05  7.53/31/103/26/98.7/3.3  ABG lactate: --  Arterial Blood Gas:   @ 09:44  7.53/32/127/27/98.7/4.1  ABG lactate: --  Arterial Blood Gas:   @ 04:47  7.49/35/113/27/98.3/3.3  ABG lactate: --  Arterial Blood Gas:   @ 00:00  7.46/37/88/26/97.3/2.4  ABG lactate: --  Arterial Blood Gas:   @ 21:14  7.44/38/118/26/98.3/1.6  ABG lactate: --  Arterial Blood Gas:   @ 18:06  7.45/38/96/26/98.6/2.3  ABG lactate: --  Arterial Blood Gas:   @ 14:45  7.47/37/95/27/97.9/3.1  ABG lactate: --  Arterial Blood Gas:   @ 13:30  7.54/29/92/25/97.7/2.5  ABG lactate: --  Arterial Blood Gas:   @ 09:50  7.51/28/100/22/98.7/-0.2  ABG lactate: --  Arterial Blood Gas:   @ 05:24  7.50/28/98/22/97.9/-0.8  ABG lactate: --    Venous Blood Gas:   @ 00:00  7.50/33/53/26/87.3  VBG Lactate: 1.6  Venous Blood Gas:   @ 15:05  7.46/31/53/22/86.7  VBG Lactate: 2.2  Venous Blood Gas:   @ 09:44  7.48/35/59/26/90.1  VBG Lactate: 2.5  Venous Blood Gas:   @ 00:00  7.42/40/52/26/80.5  VBG Lactate: 4.5      MICROBIOLOGY:     RADIOLOGY:  [ ] Reviewed and interpreted by me    Point of Care Ultrasound Findings:    PFT:    EKG:

## 2022-09-30 NOTE — PROGRESS NOTE ADULT - ASSESSMENT
71F w/h/o uncontrolled T2DM (A1C 9.4%) on basal/bolus insulin PTA. Unknown DM complications. Also COPD, secondary adrenal Insufficiency on chronic steroids, colorectal cancer s/p resection (colostomy bag), Chronic A fib on Eliquis, and tracheomalacia and multiple intubations, recent dx of OM presented to ED at [facility] with epigastric pain, belching and central chest pain. Found on CTA to have type A aortic dissection and transferred to [facility] for surgical evaluation by Dr. Cabrera. Now s/p aortic dissection repair on 9/07/22. Endocrine consulted for assistance with uncontrolled DM/steroids. Pt in ICU with ventricular dysfunction/sepsis intubated, now off pressors and back on 24h TFs requiring insulin drip with BG at goal. No hypoglycemia. BG goal (100-180mg/dl).    On stress steroid doses due to present critical condition and h/o adrenal insufficiency.  Since pt is now off pressors spoke to team to start tapering steroid as recommended yesterday. Remains NPO and on 24 TFs but nutrition order still with PO intake and overnight TFs> spoke to CTU team to update order.

## 2022-09-30 NOTE — PROGRESS NOTE ADULT - ASSESSMENT
71 year-old female with a history of DM2, CAD, A-Fib on apixaban, Severe Persistent Asthma (on chronic steroids, recently started on Tezspire), colon cancer s/p resection/chemo, and tracheobronchomalacia s/p tracheoplasty, and recent OM of the R foot s/b debridement and completed course of Vancomycin/Ertapenem for MRSA/ESBL Proteus/Corynebacterium who now presents with chest pain, found to have Type A dissection s/p repair with modified Bentall procedure and hemiarch replacement on 9/6/22. Post-op course complicated by acute hypoxemic respiratory failure, shock, anemia, and hyperglycemia requiring insulin gtt. Extubated 9/13, now awake and alert with mild encephalopathy but overall significantly improved.    Assessment:  Acute hypoxemic respiratory failure requiring intubation  Type A Aortic Dissection  Severe Persistent Asthma  History of Tracheobronchomalacia    Plan:  See below:  -**************************                                SEE Below:  9/14-improving but weakness  9/15-ABX adjusted to ceftriaxone (LFTS)-PICU  9/16-PICU, low grade temp-fever work up in progress, no resp decline or sx  9/19-resp stable at present but tenuous  9/20-for FEESST today, NGT in place w/TF  9/21-s/p FEESST-passed, remains in PICU          9/22-TF in place at 40cc, more OOB          9/23-hypoglycemia noted and rx, no change in resp status  9/28-vented/sedated-pressors/inotropes/ABX  9/29-remains vented on nitrous/less O2 needs, improving LFTS                   9/30-vented/semi sedated--less O2 needs

## 2022-09-30 NOTE — PROGRESS NOTE ADULT - NSPROGADDITIONALINFOA_GEN_ALL_CORE
-Plan discussed with pt/team.  Contact info: [phone] (24/7). pager [phone]  [email or web address] password [email or web address]  Spent 29 minutes assessing pt/labs/meds and discussing plan of care with primary team  Adjusting insulin  Discharge plan  Follow up care

## 2022-10-01 LAB
ALBUMIN SERPL ELPH-MCNC: 3.9 G/DL — SIGNIFICANT CHANGE UP (ref 3.3–5)
ALP SERPL-CCNC: 258 U/L — HIGH (ref 40–120)
ALT FLD-CCNC: 1080 U/L — HIGH (ref 10–45)
ANION GAP SERPL CALC-SCNC: 11 MMOL/L — SIGNIFICANT CHANGE UP (ref 5–17)
APTT BLD: 28.2 SEC — SIGNIFICANT CHANGE UP (ref 27.5–35.5)
AST SERPL-CCNC: 216 U/L — HIGH (ref 10–40)
BASE EXCESS BLDV CALC-SCNC: 1.5 MMOL/L — SIGNIFICANT CHANGE UP (ref -2–3)
BILIRUB SERPL-MCNC: 1.4 MG/DL — HIGH (ref 0.2–1.2)
BUN SERPL-MCNC: 39 MG/DL — HIGH (ref 7–23)
CA-I SERPL-SCNC: 1.17 MMOL/L — SIGNIFICANT CHANGE UP (ref 1.15–1.33)
CALCIUM SERPL-MCNC: 9 MG/DL — SIGNIFICANT CHANGE UP (ref 8.4–10.5)
CHLORIDE BLDV-SCNC: 119 MMOL/L — HIGH (ref 96–108)
CHLORIDE SERPL-SCNC: 120 MMOL/L — HIGH (ref 96–108)
CO2 BLDV-SCNC: 27 MMOL/L — HIGH (ref 22–26)
CO2 SERPL-SCNC: 22 MMOL/L — SIGNIFICANT CHANGE UP (ref 22–31)
CREAT SERPL-MCNC: 0.78 MG/DL — SIGNIFICANT CHANGE UP (ref 0.5–1.3)
D DIMER BLD IA.RAPID-MCNC: HIGH NG/ML DDU
EGFR: 80 ML/MIN/1.73M2 — SIGNIFICANT CHANGE UP
FIBRINOGEN PPP-MCNC: 626 MG/DL — HIGH (ref 330–520)
GAS PNL BLDA: SIGNIFICANT CHANGE UP
GAS PNL BLDA: SIGNIFICANT CHANGE UP
GAS PNL BLDV: 150 MMOL/L — HIGH (ref 136–145)
GAS PNL BLDV: SIGNIFICANT CHANGE UP
GAS PNL BLDV: SIGNIFICANT CHANGE UP
GLUCOSE BLDC GLUCOMTR-MCNC: 100 MG/DL — HIGH (ref 70–99)
GLUCOSE BLDC GLUCOMTR-MCNC: 102 MG/DL — HIGH (ref 70–99)
GLUCOSE BLDC GLUCOMTR-MCNC: 108 MG/DL — HIGH (ref 70–99)
GLUCOSE BLDC GLUCOMTR-MCNC: 122 MG/DL — HIGH (ref 70–99)
GLUCOSE BLDC GLUCOMTR-MCNC: 123 MG/DL — HIGH (ref 70–99)
GLUCOSE BLDC GLUCOMTR-MCNC: 134 MG/DL — HIGH (ref 70–99)
GLUCOSE BLDC GLUCOMTR-MCNC: 144 MG/DL — HIGH (ref 70–99)
GLUCOSE BLDC GLUCOMTR-MCNC: 157 MG/DL — HIGH (ref 70–99)
GLUCOSE BLDC GLUCOMTR-MCNC: 158 MG/DL — HIGH (ref 70–99)
GLUCOSE BLDC GLUCOMTR-MCNC: 160 MG/DL — HIGH (ref 70–99)
GLUCOSE BLDC GLUCOMTR-MCNC: 173 MG/DL — HIGH (ref 70–99)
GLUCOSE BLDC GLUCOMTR-MCNC: 175 MG/DL — HIGH (ref 70–99)
GLUCOSE BLDC GLUCOMTR-MCNC: 181 MG/DL — HIGH (ref 70–99)
GLUCOSE BLDC GLUCOMTR-MCNC: 194 MG/DL — HIGH (ref 70–99)
GLUCOSE BLDC GLUCOMTR-MCNC: 199 MG/DL — HIGH (ref 70–99)
GLUCOSE BLDC GLUCOMTR-MCNC: 201 MG/DL — HIGH (ref 70–99)
GLUCOSE BLDC GLUCOMTR-MCNC: 206 MG/DL — HIGH (ref 70–99)
GLUCOSE BLDC GLUCOMTR-MCNC: 206 MG/DL — HIGH (ref 70–99)
GLUCOSE BLDC GLUCOMTR-MCNC: 97 MG/DL — SIGNIFICANT CHANGE UP (ref 70–99)
GLUCOSE BLDV-MCNC: 113 MG/DL — HIGH (ref 70–99)
GLUCOSE SERPL-MCNC: 122 MG/DL — HIGH (ref 70–99)
HCO3 BLDV-SCNC: 26 MMOL/L — SIGNIFICANT CHANGE UP (ref 22–29)
HCT VFR BLD CALC: 28.9 % — LOW (ref 34.5–45)
HCT VFR BLDA CALC: 27 % — LOW (ref 34.5–46.5)
HEPARIN-PF4 AB RESULT: <0.6 U/ML — SIGNIFICANT CHANGE UP (ref 0–0.9)
HGB BLD CALC-MCNC: 9.1 G/DL — LOW (ref 11.7–16.1)
HGB BLD-MCNC: 8.9 G/DL — LOW (ref 11.5–15.5)
HOROWITZ INDEX BLDV+IHG-RTO: 40 — SIGNIFICANT CHANGE UP
INR BLD: 1.43 RATIO — HIGH (ref 0.88–1.16)
LACTATE BLDV-MCNC: 1.5 MMOL/L — SIGNIFICANT CHANGE UP (ref 0.5–2)
MAGNESIUM SERPL-MCNC: 2.8 MG/DL — HIGH (ref 1.6–2.6)
MCHC RBC-ENTMCNC: 24.1 PG — LOW (ref 27–34)
MCHC RBC-ENTMCNC: 30.8 GM/DL — LOW (ref 32–36)
MCV RBC AUTO: 78.1 FL — LOW (ref 80–100)
NRBC # BLD: 3 /100 WBCS — HIGH (ref 0–0)
PCO2 BLDV: 39 MMHG — SIGNIFICANT CHANGE UP (ref 39–42)
PF4 HEPARIN CMPLX AB SER-ACNC: NEGATIVE — SIGNIFICANT CHANGE UP
PH BLDV: 7.43 — SIGNIFICANT CHANGE UP (ref 7.32–7.43)
PHOSPHATE SERPL-MCNC: 2.4 MG/DL — LOW (ref 2.5–4.5)
PLATELET # BLD AUTO: 64 K/UL — LOW (ref 150–400)
PO2 BLDV: 48 MMHG — HIGH (ref 25–45)
POTASSIUM BLDV-SCNC: 5 MMOL/L — SIGNIFICANT CHANGE UP (ref 3.5–5.1)
POTASSIUM SERPL-MCNC: 4.2 MMOL/L — SIGNIFICANT CHANGE UP (ref 3.5–5.3)
POTASSIUM SERPL-SCNC: 4.2 MMOL/L — SIGNIFICANT CHANGE UP (ref 3.5–5.3)
PROT SERPL-MCNC: 6.7 G/DL — SIGNIFICANT CHANGE UP (ref 6–8.3)
PROTHROM AB SERPL-ACNC: 16.6 SEC — HIGH (ref 10.5–13.4)
RBC # BLD: 3.7 M/UL — LOW (ref 3.8–5.2)
RBC # FLD: 25.2 % — HIGH (ref 10.3–14.5)
SAO2 % BLDV: 79.4 % — SIGNIFICANT CHANGE UP (ref 67–88)
SODIUM SERPL-SCNC: 153 MMOL/L — HIGH (ref 135–145)
VANCOMYCIN TROUGH SERPL-MCNC: 13.6 UG/ML — SIGNIFICANT CHANGE UP (ref 10–20)
VANCOMYCIN TROUGH SERPL-MCNC: 15.1 UG/ML — SIGNIFICANT CHANGE UP (ref 10–20)
WBC # BLD: 19 K/UL — HIGH (ref 3.8–10.5)
WBC # FLD AUTO: 19 K/UL — HIGH (ref 3.8–10.5)

## 2022-10-01 PROCEDURE — 99291 CRITICAL CARE FIRST HOUR: CPT | Mod: 25

## 2022-10-01 PROCEDURE — 93321 DOPPLER ECHO F-UP/LMTD STD: CPT | Mod: 26

## 2022-10-01 PROCEDURE — 93308 TTE F-UP OR LMTD: CPT | Mod: 26

## 2022-10-01 PROCEDURE — 71045 X-RAY EXAM CHEST 1 VIEW: CPT | Mod: 26,76

## 2022-10-01 PROCEDURE — 36556 INSERT NON-TUNNEL CV CATH: CPT

## 2022-10-01 PROCEDURE — 99232 SBSQ HOSP IP/OBS MODERATE 35: CPT

## 2022-10-01 PROCEDURE — 99292 CRITICAL CARE ADDL 30 MIN: CPT | Mod: 25

## 2022-10-01 PROCEDURE — 36620 INSERTION CATHETER ARTERY: CPT

## 2022-10-01 PROCEDURE — 77001 FLUOROGUIDE FOR VEIN DEVICE: CPT | Mod: 26

## 2022-10-01 PROCEDURE — 36590 REMOVAL TUNNELED CV CATH: CPT

## 2022-10-01 RX ORDER — HYDROMORPHONE HYDROCHLORIDE 2 MG/ML
0.5 INJECTION INTRAMUSCULAR; INTRAVENOUS; SUBCUTANEOUS ONCE
Refills: 0 | Status: DISCONTINUED | OUTPATIENT
Start: 2022-10-01 | End: 2022-10-01

## 2022-10-01 RX ORDER — POTASSIUM PHOSPHATE, MONOBASIC POTASSIUM PHOSPHATE, DIBASIC 236; 224 MG/ML; MG/ML
15 INJECTION, SOLUTION INTRAVENOUS ONCE
Refills: 0 | Status: COMPLETED | OUTPATIENT
Start: 2022-10-01 | End: 2022-10-01

## 2022-10-01 RX ORDER — VANCOMYCIN HCL 1 G
750 VIAL (EA) INTRAVENOUS EVERY 12 HOURS
Refills: 0 | Status: DISCONTINUED | OUTPATIENT
Start: 2022-10-01 | End: 2022-10-01

## 2022-10-01 RX ORDER — VANCOMYCIN HCL 1 G
750 VIAL (EA) INTRAVENOUS EVERY 12 HOURS
Refills: 0 | Status: DISCONTINUED | OUTPATIENT
Start: 2022-10-01 | End: 2022-10-02

## 2022-10-01 RX ORDER — VECURONIUM BROMIDE 20 MG/1
2.5 INJECTION, POWDER, FOR SOLUTION INTRAVENOUS ONCE
Refills: 0 | Status: COMPLETED | OUTPATIENT
Start: 2022-10-01 | End: 2022-10-01

## 2022-10-01 RX ORDER — CHLORHEXIDINE GLUCONATE 213 G/1000ML
15 SOLUTION TOPICAL EVERY 12 HOURS
Refills: 0 | Status: DISCONTINUED | OUTPATIENT
Start: 2022-10-01 | End: 2022-10-10

## 2022-10-01 RX ORDER — VANCOMYCIN HCL 1 G
750 VIAL (EA) INTRAVENOUS ONCE
Refills: 0 | Status: COMPLETED | OUTPATIENT
Start: 2022-10-01 | End: 2022-10-01

## 2022-10-01 RX ORDER — NICARDIPINE HYDROCHLORIDE 30 MG/1
5 CAPSULE, EXTENDED RELEASE ORAL
Qty: 40 | Refills: 0 | Status: DISCONTINUED | OUTPATIENT
Start: 2022-10-01 | End: 2022-10-02

## 2022-10-01 RX ORDER — HYDROCORTISONE 20 MG
100 TABLET ORAL EVERY 8 HOURS
Refills: 0 | Status: DISCONTINUED | OUTPATIENT
Start: 2022-10-01 | End: 2022-10-03

## 2022-10-01 RX ORDER — IPRATROPIUM BROMIDE 0.2 MG/ML
1 SOLUTION, NON-ORAL INHALATION EVERY 6 HOURS
Refills: 0 | Status: DISCONTINUED | OUTPATIENT
Start: 2022-10-01 | End: 2022-10-01

## 2022-10-01 RX ORDER — MILRINONE LACTATE 1 MG/ML
0.2 INJECTION, SOLUTION INTRAVENOUS
Qty: 20 | Refills: 0 | Status: DISCONTINUED | OUTPATIENT
Start: 2022-10-01 | End: 2022-10-01

## 2022-10-01 RX ORDER — PROPOFOL 10 MG/ML
10 INJECTION, EMULSION INTRAVENOUS
Qty: 500 | Refills: 0 | Status: DISCONTINUED | OUTPATIENT
Start: 2022-10-01 | End: 2022-10-01

## 2022-10-01 RX ORDER — VANCOMYCIN HCL 1 G
VIAL (EA) INTRAVENOUS
Refills: 0 | Status: DISCONTINUED | OUTPATIENT
Start: 2022-10-01 | End: 2022-10-01

## 2022-10-01 RX ORDER — IPRATROPIUM/ALBUTEROL SULFATE 18-103MCG
3 AEROSOL WITH ADAPTER (GRAM) INHALATION EVERY 6 HOURS
Refills: 0 | Status: DISCONTINUED | OUTPATIENT
Start: 2022-10-01 | End: 2022-10-10

## 2022-10-01 RX ADMIN — CHLORHEXIDINE GLUCONATE 15 MILLILITER(S): 213 SOLUTION TOPICAL at 05:43

## 2022-10-01 RX ADMIN — Medication 100 MILLIGRAM(S): at 21:02

## 2022-10-01 RX ADMIN — HYDROMORPHONE HYDROCHLORIDE 0.5 MILLIGRAM(S): 2 INJECTION INTRAMUSCULAR; INTRAVENOUS; SUBCUTANEOUS at 20:05

## 2022-10-01 RX ADMIN — Medication 100 MILLIGRAM(S): at 05:44

## 2022-10-01 RX ADMIN — HYDROMORPHONE HYDROCHLORIDE 0.5 MILLIGRAM(S): 2 INJECTION INTRAMUSCULAR; INTRAVENOUS; SUBCUTANEOUS at 19:50

## 2022-10-01 RX ADMIN — Medication 750 MILLIGRAM(S): at 03:07

## 2022-10-01 RX ADMIN — MEROPENEM 100 MILLIGRAM(S): 1 INJECTION INTRAVENOUS at 20:27

## 2022-10-01 RX ADMIN — Medication 5.08 MICROGRAM(S)/KG/MIN: at 07:58

## 2022-10-01 RX ADMIN — MEXILETINE HYDROCHLORIDE 200 MILLIGRAM(S): 150 CAPSULE ORAL at 08:52

## 2022-10-01 RX ADMIN — Medication 0.5 MILLIGRAM(S): at 06:37

## 2022-10-01 RX ADMIN — Medication 1 APPLICATION(S): at 13:11

## 2022-10-01 RX ADMIN — Medication 3 MILLILITER(S): at 12:51

## 2022-10-01 RX ADMIN — Medication 500000 UNIT(S): at 01:55

## 2022-10-01 RX ADMIN — Medication 3 MILLILITER(S): at 17:07

## 2022-10-01 RX ADMIN — MEROPENEM 100 MILLIGRAM(S): 1 INJECTION INTRAVENOUS at 05:46

## 2022-10-01 RX ADMIN — CHLORHEXIDINE GLUCONATE 1 APPLICATION(S): 213 SOLUTION TOPICAL at 05:01

## 2022-10-01 RX ADMIN — MEXILETINE HYDROCHLORIDE 200 MILLIGRAM(S): 150 CAPSULE ORAL at 19:50

## 2022-10-01 RX ADMIN — Medication 0.5 MILLIGRAM(S): at 17:07

## 2022-10-01 RX ADMIN — PROPOFOL 4.06 MICROGRAM(S)/KG/MIN: 10 INJECTION, EMULSION INTRAVENOUS at 02:42

## 2022-10-01 RX ADMIN — Medication 1 TABLET(S): at 13:11

## 2022-10-01 RX ADMIN — Medication 500000 UNIT(S): at 20:36

## 2022-10-01 RX ADMIN — DEXMEDETOMIDINE HYDROCHLORIDE IN 0.9% SODIUM CHLORIDE 8.46 MICROGRAM(S)/KG/HR: 4 INJECTION INTRAVENOUS at 07:58

## 2022-10-01 RX ADMIN — INSULIN HUMAN 3 UNIT(S)/HR: 100 INJECTION, SOLUTION SUBCUTANEOUS at 07:58

## 2022-10-01 RX ADMIN — POTASSIUM PHOSPHATE, MONOBASIC POTASSIUM PHOSPHATE, DIBASIC 62.5 MILLIMOLE(S): 236; 224 INJECTION, SOLUTION INTRAVENOUS at 02:42

## 2022-10-01 RX ADMIN — Medication 500000 UNIT(S): at 05:43

## 2022-10-01 RX ADMIN — Medication 500000 UNIT(S): at 13:11

## 2022-10-01 RX ADMIN — Medication 100 MILLIGRAM(S): at 13:12

## 2022-10-01 RX ADMIN — CHLORHEXIDINE GLUCONATE 15 MILLILITER(S): 213 SOLUTION TOPICAL at 20:36

## 2022-10-01 RX ADMIN — VECURONIUM BROMIDE 2.5 MILLIGRAM(S): 20 INJECTION, POWDER, FOR SOLUTION INTRAVENOUS at 01:00

## 2022-10-01 RX ADMIN — Medication 250 MILLIGRAM(S): at 19:34

## 2022-10-01 RX ADMIN — PANTOPRAZOLE SODIUM 40 MILLIGRAM(S): 20 TABLET, DELAYED RELEASE ORAL at 13:11

## 2022-10-01 NOTE — PROGRESS NOTE ADULT - SUBJECTIVE AND OBJECTIVE BOX
Patient seen and examined at the bedside.    Remained critically ill on continuous ICU monitoring.    OBJECTIVE:  Vital Signs Last 24 Hrs  T(C): 37.2 (01 Oct 2022 04:00), Max: 39.8 (30 Sep 2022 16:00)  T(F): 99 (01 Oct 2022 04:00), Max: 103.6 (30 Sep 2022 16:00)  HR: 67 (01 Oct 2022 06:45) (66 - 135)  BP: 97/51 (30 Sep 2022 21:47) (97/51 - 124/62)  BP(mean): 70 (30 Sep 2022 21:47) (70 - 87)  RR: 29 (01 Oct 2022 06:45) (16 - 44)  SpO2: 99% (01 Oct 2022 06:45) (96% - 100%)    Parameters below as of 01 Oct 2022 06:35  Patient On (Oxygen Delivery Method): ventilator          Assessment:  71F PMH DM, COPD, Chronic Adrenal Insufficiency on Chronic prednisone, history of colorectal cancer s/p resection (colostomy bag), Hx of CAD, Chronic A fib on Eliquis, and tracheomalacia and multiple intubations, recent dx of OM presented to ED at [facility] this morning with epigastric pain, belching and central chest pain. Found on CTA to have type A aortic dissection and transferred to [facility] for surgical evaluation by Dr. Cabrera.     Ascending aortic dissection s/p modified bentall and hemiarch on 9/6   Hypovolemic shock   Cardiogenic shock  Post op respiratory insufficiency  Acute blood loss anemia  Thrombocytopenia  RIJ thrombus  Pancreatic cyst    Proteus PNA  Leukocytosis      Plan:   ***Neuro***  [x] Sedated with [x] Precedex  Post operative neuro assessment     ***Cardiovascular***  9/27: Patient returned to ICU for presumed sepsis, presenting with tachypnea, fevers, tachycardia    SR/ MAP 67/ Hct 28.9%/ Lactate 1.5  [x] Vasopressin - currently off [x] Dobutamine- 2.5 mcgs   Continue Mexiletine for VT   CT Chest on 9/12:  Status post recent ascending aortic dissection repair. Small amount of fluid surrounding the ascending aorta without evidence of enhancing wall to suggest abscess.  RUE duplex on 9/12: There is a thrombosed and occluded RIJ  Continuous reassessment of hemodynamics      ***Pulmonary***  CT Chest on 9/12: Bilateral lower lobe atelectasis with bilateral pleural effusions   CTA 9/28: RLL consolidation  Reintubated for change in mental status on 9/8, self extubated on 9/9 and reintubated again, extubated to HFO2 on 9/13 9/27 ReIntubated for airway protection (high peak/plateau pressures on volume control. Switched to Pressure control.) ARDS ?   Full vent support / Mar 10ppm   Hx of COPD / Continue bronchodilators  Monitor secretions and suction PRN       Mode: AC/ CMV (Assist Control/ Continuous Mandatory Ventilation)  RR (machine): 16  TV (machine): 450  FiO2: 40  PEEP: 8  ITime: 1  MAP: 12  PC: 14  PIP: 23              ***GI***  CT A/P on 9/12: Mild thickening of the cecum and ascending colon possibly representing mild nonspecific proximal colitis. Hepatic cirrhosis. The focal IPMN of the pancreas with large cyst within the tail the pancreas measuring 3.1 cm.  Colostomy bag in place, continue to monitor output   Passed FEES on 9/20, CC diet with reg liquids, pt with decreased appetite need to encourage PO intake    [x] Diet: Consistent carb  [x] Protonix   Simethicone PRN gas      ***Renal***  Continue to monitor I/Os, BUN/Creatinine.   Replete lytes PRN  Amezcua present       ***ID***  SCx on 9/8 +Proteus mirabilis, on 9/27 +Staphylococcus aureus  UA on 9/9+ LE, WBC 60, few yeast  /  UCx on 9/10 NG   BCx on 9/10 NGTD, BCx on 9/12 NGTD 9/16 bcx NGTD, 9/20 NGTD, 9/27 NGTD  BCx on 9/28 + Gram Positive Cocci in Clusters   Nystatin for thrush   Broad spectrum antibiotics (meropenem and vancomycin started for presumed sepsis) 9/27      ***Endocrine***  [x]  DM : HbA1c 9.4%                - [x] Insulin gtt              - Need tight glycemic control to prevent wound infection.  Endo following for recs now that patient has some PO intake with tube feed supplementation           Patient requires continuous monitoring with bedside rhythm monitoring, pulse oximetry monitoring, and continuous central venous and arterial pressure monitoring; and intermittent blood gas analysis. Care plan discussed with the ICU care team.   Patient remained critical, at risk for life threatening decompensation.    I have spent 30 minutes providing critical care management to this patient.    By signing my name below, I, Maria D'Amico, attest that this documentation has been prepared under the direction and in the presence of Bj Abbott MD   Electronically signed: Maria D'Amico, Scribe, 10-01-22 @ 07:12    I, Bj Abbott, personally performed the services described in this documentation. all medical record entries made by the scribe were at my direction and in my presence. I have reviewed the chart and agree that the record reflects my personal performance and is accurate and complete  Electronically signed: Bj Abbott MD  Patient seen and examined at the bedside.    Remained critically ill on continuous ICU monitoring.    OBJECTIVE:  Vital Signs Last 24 Hrs  T(C): 37.2 (01 Oct 2022 04:00), Max: 39.8 (30 Sep 2022 16:00)  T(F): 99 (01 Oct 2022 04:00), Max: 103.6 (30 Sep 2022 16:00)  HR: 67 (01 Oct 2022 06:45) (66 - 135)  BP: 97/51 (30 Sep 2022 21:47) (97/51 - 124/62)  BP(mean): 70 (30 Sep 2022 21:47) (70 - 87)  RR: 29 (01 Oct 2022 06:45) (16 - 44)  SpO2: 99% (01 Oct 2022 06:45) (96% - 100%)    Parameters below as of 01 Oct 2022 06:35  Patient On (Oxygen Delivery Method): ventilator          Assessment:  71F PMH DM, COPD, Chronic Adrenal Insufficiency on Chronic prednisone, history of colorectal cancer s/p resection (colostomy bag), Hx of CAD, Chronic A fib on Eliquis, and tracheomalacia and multiple intubations, recent dx of OM presented to ED at [facility] this morning with epigastric pain, belching and central chest pain. Found on CTA to have type A aortic dissection and transferred to [facility] for surgical evaluation by Dr. Cabrera.     Ascending aortic dissection s/p modified bentall and hemiarch on 9/6   Hypovolemic shock   Cardiogenic shock  Post op respiratory insufficiency  Acute blood loss anemia  Thrombocytopenia  RIJ thrombus  Pancreatic cyst    Proteus PNA  Leukocytosis      Plan:   ***Neuro***  [x] Sedated with [x] Precedex  Post operative neuro assessment     ***Cardiovascular***  TTE on 9/28: EF 41%, No left ventricular thrombus. Moderate global left ventricular systolic dysfunction. Grossly right ventricular enlargement with decreased right ventricular systolic function.  9/27: Patient returned to ICU for presumed sepsis, presenting with tachypnea, fevers, tachycardia    SR/ MAP 67/ Hct 28.9%/ Lactate 1.5  [x] Vasopressin - currently off [x] Dobutamine- 2.5 mcgs   Continue Mexiletine for VT   CT Chest on 9/12:  Status post recent ascending aortic dissection repair. Small amount of fluid surrounding the ascending aorta without evidence of enhancing wall to suggest abscess.  RUE duplex on 9/12: There is a thrombosed and occluded RIJ  Continuous reassessment of hemodynamics      ***Pulmonary***  CT Chest on 9/12: Bilateral lower lobe atelectasis with bilateral pleural effusions   CTA 9/28: RLL consolidation  Reintubated for change in mental status on 9/8, self extubated on 9/9 and reintubated again, extubated to HFO2 on 9/13 9/27 ReIntubated for airway protection (high peak/plateau pressures on volume control. Switched to Pressure control.) ARDS ?   Full vent support / Mar 10ppm   Hx of COPD / Continue bronchodilators  Monitor secretions and suction PRN       Mode: AC/ CMV (Assist Control/ Continuous Mandatory Ventilation)  RR (machine): 16  TV (machine): 450  FiO2: 40  PEEP: 8  ITime: 1  MAP: 12  PC: 14  PIP: 23              ***GI***  CT A/P on 9/12: Mild thickening of the cecum and ascending colon possibly representing mild nonspecific proximal colitis. Hepatic cirrhosis. The focal IPMN of the pancreas with large cyst within the tail the pancreas measuring 3.1 cm.  Colostomy bag in place, continue to monitor output   Passed FEES on 9/20, CC diet with reg liquids, pt with decreased appetite need to encourage PO intake    [x] Diet: Consistent carb  [x] Protonix   Simethicone PRN gas      ***Renal***  Continue to monitor I/Os, BUN/Creatinine.   Replete lytes PRN  Amezcua present       ***ID***  Febrile yesterday 103.6 F  SCx on 9/8 +Proteus mirabilis, on 9/27 +Staphylococcus aureus  UA on 9/9+ LE, WBC 60, few yeast  /  UCx on 9/10 NG   BCx on 9/10 NGTD, BCx on 9/12 NGTD 9/16 bcx NGTD, 9/20 NGTD, 9/27 NGTD  BCx on 9/28 + Gram Positive Cocci in Clusters   Nystatin for thrush   Broad spectrum antibiotics (meropenem and vancomycin started for presumed sepsis) 9/27      ***Endocrine***  [x]  DM : HbA1c 9.4%                - [x] Insulin gtt              - Need tight glycemic control to prevent wound infection.  Endo following for recs now that patient has some PO intake with tube feed supplementation           Patient requires continuous monitoring with bedside rhythm monitoring, pulse oximetry monitoring, and continuous central venous and arterial pressure monitoring; and intermittent blood gas analysis. Care plan discussed with the ICU care team.   Patient remained critical, at risk for life threatening decompensation.    I have spent 30 minutes providing critical care management to this patient.    By signing my name below, I, Maria D'Amico, attest that this documentation has been prepared under the direction and in the presence of Bj Abbott MD   Electronically signed: Maria D'Amico, Scribe, 10-01-22 @ 07:12    I, Bj Abbott, personally performed the services described in this documentation. all medical record entries made by the marcyibe were at my direction and in my presence. I have reviewed the chart and agree that the record reflects my personal performance and is accurate and complete  Electronically signed: Bj Abbott MD

## 2022-10-01 NOTE — PROGRESS NOTE ADULT - SUBJECTIVE AND OBJECTIVE BOX
INFECTIOUS DISEASES FOLLOW UP-- Fouzia Calixto  [phone]    This is a follow up note for this  72yFemale with  Dissection of thoracic aorta  Assessment:  71F PMH DM, COPD, Chronic Adrenal Insufficiency on Chronic prednisone, history of colorectal cancer s/p resection (colostomy bag), Hx of CAD, Chronic A fib on Eliquis, and tracheomalacia and multiple intubations, recent dx of OM presented to ED at [facility] this morning with epigastric pain, belching and central chest pain. Found on CTA to have type A aortic dissection and transferred to [facility] for surgical evaluation by Dr. Cabrera.     Ascending aortic dissection s/p modified bentall and hemiarch on 9/6   Hypovolemic shock   Cardiogenic shock  Post op respiratory insufficiency  Acute blood loss anemia  Thrombocytopenia  RIJ thrombus  Pancreatic cyst    Proteus PNA  Leukocytosis      ROS:  CONSTITUTIONAL: intubated, ventilated, eyes open    Allergies    aspirin (Short breath)  Avelox (Short breath; Pruritus)  cefepime (Anaphylaxis)  codeine (Short breath)  Dilaudid (Short breath)  iodine (Short breath; Swelling)  penicillin (Anaphylaxis)  shellfish (Anaphylaxis)  tetanus toxoid (Short breath)  Valium (Short breath)    Intolerances        ANTIBIOTICS/RELEVANT:  antimicrobials  meropenem  IVPB 1000 milliGRAM(s) IV Intermittent every 12 hours  meropenem  IVPB      nystatin    Suspension 467146 Unit(s) Oral every 6 hours  vancomycin  IVPB 750 milliGRAM(s) IV Intermittent every 12 hours    immunologic:    OTHER:  albuterol/ipratropium for Nebulization 3 milliLiter(s) Nebulizer every 6 hours  buDESOnide    Inhalation Suspension 0.5 milliGRAM(s) Inhalation every 12 hours  chlorhexidine 0.12% Liquid 15 milliLiter(s) Oral Mucosa every 12 hours  chlorhexidine 2% Cloths 1 Application(s) Topical <User Schedule>  collagenase Ointment 1 Application(s) Topical daily  dexMEDEtomidine Infusion 0.5 MICROgram(s)/kG/Hr IV Continuous <Continuous>  DOBUTamine Infusion 2.5 MICROgram(s)/kG/Min IV Continuous <Continuous>  hydrocortisone sodium succinate Injectable 100 milliGRAM(s) IV Push every 8 hours  insulin regular Infusion 3 Unit(s)/Hr IV Continuous <Continuous>  mexiletine 200 milliGRAM(s) Oral every 8 hours  multivitamin 1 Tablet(s) Oral daily  pantoprazole  Injectable 40 milliGRAM(s) IV Push daily  simethicone drops 80 milliGRAM(s) Oral every 12 hours PRN  sodium chloride 0.9%. 1000 milliLiter(s) IV Continuous <Continuous>  vasopressin Infusion 0.04 Unit(s)/Min IV Continuous <Continuous>      Objective:  Vital Signs Last 24 Hrs  T(C): 37.4 (01 Oct 2022 12:00), Max: 38.8 (30 Sep 2022 20:00)  T(F): 99.3 (01 Oct 2022 12:00), Max: 101.8 (30 Sep 2022 20:00)  HR: 75 (01 Oct 2022 16:00) (66 - 135)  BP: 97/51 (30 Sep 2022 21:47) (97/51 - 124/62)  BP(mean): 70 (30 Sep 2022 21:47) (70 - 87)  RR: 28 (01 Oct 2022 16:00) (16 - 40)  SpO2: 99% (01 Oct 2022 16:00) (96% - 100%)    Parameters below as of 01 Oct 2022 12:51  Patient On (Oxygen Delivery Method): ventilator        PHYSICAL EXAM:  Constitutional: febrile  Eyes:EBER, EOMI  Ear/Nose/Throat: no oral lesions, ET tube	  Respiratory: sternotomy site with steri strips  right chest medi port no surrounding erythema  Cardiovascular: S1S2  Gastrointestinal:soft, (+) BS, no tenderness colostomy left side pink with brown stool in bag  Extremities:no e/e/c  No Lymphadenopathy  IV sites not inflammed.    LABS:                        8.9    19.00 )-----------( 64       ( 01 Oct 2022 00:24 )             28.9     10-01    153<H>  |  120<H>  |  39<H>  ----------------------------<  122<H>  4.2   |  22  |  0.78    Ca    9.0      01 Oct 2022 00:24  Phos  2.4     10-01  Mg     2.8     10-01    TPro  6.7  /  Alb  3.9  /  TBili  1.4<H>  /  DBili  x   /  AST  216<H>  /  ALT  1080<H>  /  AlkPhos  258<H>  10-01    PT/INR - ( 01 Oct 2022 04:48 )   PT: 16.6 sec;   INR: 1.43 ratio         PTT - ( 01 Oct 2022 04:48 )  PTT:28.2 sec      MICROBIOLOGY:    Culture - Blood (09.30.22 @ 11:45)    Specimen Source: .Blood Blood    Culture Results:   No growth to date.            RECENT CULTURES:  09-30 @ 11:45  .Blood Blood  --  --  --    No growth to date.  --  09-29 @ 18:33  .Bronchial Bronchial Lavage  --  --  --    Normal Respiratory Jessica present  --  09-28 @ 01:48  .Blood Blood-Peripheral  Blood Culture PCR  Blood Culture PCR  PCR    Growth in anaerobic bottle: Staphylococcus aureus  ***Blood Panel PCR results on this specimen are available  approximately 3 hours after the Gram stain result.***  Gram stain, PCR, and/or culture results may not always  correspond due to difference in methodologies.  ************************************************************  This PCR assay was performed by multiplex PCR. This  Assay tests for 66 bacterial and resistance gene targets.  Please refer to the [facility] test directory  at [email or web address] for details.  --  09-27 @ 17:54  Trach Asp Tracheal Aspirate  Methicillin resistant Staphylococcus aureus  Methicillin resistant Staphylococcus aureus  GARRETT    Moderate Methicillin Resistant Staphylococcus aureus  Normal Respiratory Jessica present  --  09-27 @ 14:59  Catheterized Catheterized  --  --  --    10,000 - 49,000 CFU/mL Candida glabrata "Susceptibilities not performed"  --  09-27 @ 11:21  .Blood Blood-Peripheral  --  --  --    No growth to date.  --  09-27 @ 06:40  .Blood Blood-Peripheral  --  --  --    No growth to date.  --      RADIOLOGY & ADDITIONAL STUDIES:    < from: Xray Chest 1 View- PORTABLE-Urgent (Xray Chest 1 View- PORTABLE-Urgent .) (10.01.22 @ 15:05) >  There is no pneumothorax or pleural effusion.    IMPRESSION:    Interval placement of additional enteric tube coursing below the   diaphragm with tip not visualized below this level of the film.    < end of copied text >

## 2022-10-01 NOTE — CONSULT NOTE ADULT - SUBJECTIVE AND OBJECTIVE BOX
Vascular & Interventional Radiology Consult Note    Evaluate for Procedure: port removal    HPI: 72y Female with DM, COPD, Chronic Adrenal Insufficiency on Chronic prednisone, history of colorectal cancer s/p resection (colostomy bag), Hx of CAD, Chronic A fib on Eliquis, and tracheomalacia and multiple intubations, recent dx of OM presented to ED at [facility] this morning with epigastric pain, belching and central chest pain. Found on CTA to have type A aortic dissection and transferred to [facility] s/p [facility] 9/6 now with bactermia and indwelling right chest port no longer needed.     Allergies: aspirin (Short breath)  Avelox (Short breath; Pruritus)  cefepime (Anaphylaxis)  codeine (Short breath)  Dilaudid (Short breath)  iodine (Short breath; Swelling)  penicillin (Anaphylaxis)  tetanus toxoid (Short breath)  Valium (Short breath)    Medications (Abx/Cardiac/Anticoagulation/Blood Products)    aMIOdarone IVPB: 600 mL/Hr IV Intermittent (09-30 @ 19:05)  aMIOdarone IVPB: 600 mL/Hr IV Intermittent (09-30 @ 19:45)  DOBUTamine Infusion: 5.08 mL/Hr IV Continuous (09-30 @ 19:35)  meropenem  IVPB: 100 mL/Hr IV Intermittent (10-01 @ 05:46)  mexiletine: 200 milliGRAM(s) Oral (10-01 @ 08:52)  niCARdipine Infusion: 25 mL/Hr IV Continuous (09-30 @ 18:53)  nystatin    Suspension: [id number] Unit(s) Oral (10-01 @ 13:11)  vancomycin  IVPB: 250 mL/Hr IV Intermittent (09-30 @ 12:22)  vancomycin  IVPB: 750 milliGRAM(s) IV Intermittent (10-01 @ 03:07)    Data:    T(C): 37.4  HR: 71  BP: 97/51  RR: 29  SpO2: 99%    -WBC 19.00 / HgB 8.9 / Hct 28.9 / Plt 64  -Na 153 / Cl 120 / BUN 39 / Glucose 122  -K 4.2 / CO2 22 / Cr 0.78  -ALT 1080 / Alk Phos 258 / T.Bili 1.4  -INR 1.43 / PTT 28.2

## 2022-10-01 NOTE — PROGRESS NOTE ADULT - NUTRITIONAL ASSESSMENT
This patient has been assessed with a concern for Malnutrition and has been determined to have a diagnosis/diagnoses of Moderate protein-calorie malnutrition.    This patient is being managed with:   Diet Consistent Carbohydrate/No Snacks-  DASH/TLC {Sodium & Cholesterol Restricted} (DASH)  Tube Feeding Modality: Nasogastric  Glucerna 1.5 Chago (GLUCERNA1.5RTH)  Total Volume for 24 Hours (mL): 400  Intermittent  Starting Tube Feed Rate {mL per Hour}: 40  Until Goal Tube Feed Rate (mL per Hour): 40  Tube Feeding Hours ON: 10  Tube Feeding OFF (Hours): 14  Tube Feed Start Time: 20:00  Supplement Feeding Modality:  Oral  Glucerna Shake Cans or Servings Per Day:  3       Frequency:  Daily  Entered: Sep 24 2022 10:19AM

## 2022-10-01 NOTE — PROGRESS NOTE ADULT - SUBJECTIVE AND OBJECTIVE BOX
HPI:  71F PMH DM, COPD, Chronic Adrenal Insufficiency on Chronic prednisone, history of colorectal cancer s/p resection (colostomy bag), Hx of CAD, Chronic A fib on Eliquis, and tracheomalacia and multiple intubations, recent dx of OM presented to ED at [facility] this morning with epigastric pain, belching and central chest pain. Found on CTA to have type A aortic dissection and transferred to [facility] for surgical evaluation by Dr. Cabrera. (06 Sep 2022 16:32)      Patient seen and examined at the bedside.    Remained critically ill on continuous ICU monitoring.    OBJECTIVE:  ICU Vital Signs Last 24 Hrs  T(C): 36.8 (01 Oct 2022 20:00), Max: 38.3 (01 Oct 2022 00:00)  T(F): 98.2 (01 Oct 2022 20:00), Max: 100.9 (01 Oct 2022 00:00)  HR: 78 (01 Oct 2022 22:30) (66 - 130)  BP: --  BP(mean): --  ABP: 121/51 (01 Oct 2022 22:30) (57/50 - 163/66)  ABP(mean): 75 (01 Oct 2022 22:30) (62 - 104)  RR: 22 (01 Oct 2022 22:30) (16 - 40)  SpO2: 100% (01 Oct 2022 22:30) (96% - 100%)    O2 Parameters below as of 01 Oct 2022 20:00      O2 Concentration (%): 40      Parameters below as of 30 Sep 2022 11:17  Patient On (Oxygen Delivery Method): ventilator        Physical Exam:   General: intubated  Neurology: somnolent, but able to follow simple commands  ENT/Neck: Neck supple, trachea midline, No JVD  Respiratory: rhonchi throughout    CV: S1S2, no murmurs        [x] Sinus Tachy  Abdominal: Soft, NT, ND, colostomy in place  Extremities: +4 RUE edema, 2 + pedal edema noted, + peripheral pulses   Skin: Dry dressing over right heel, no Rashes, Hematoma, Ecchymosis                Assessment:  71F PMH DM, COPD, Chronic Adrenal Insufficiency on Chronic prednisone, history of colorectal cancer s/p resection (colostomy bag), Hx of CAD, Chronic A fib on Eliquis, and tracheomalacia and multiple intubations, recent dx of OM presented to ED at [facility] this morning with epigastric pain, belching and central chest pain. Found on CTA to have type A aortic dissection and transferred to [facility] for surgical evaluation by Dr. Cabrera.     Ascending aortic dissection s/p modified bentall and hemiarch on 9/6   Hypovolemic shock   Post op respiratory insufficiency  Acute blood loss anemia  Stress hyperglycemia   RIJ thrombus  Pancreatic cyst    Proteus PNA  Leukocytosis      Plan:   ***Neuro***  Continue close monitoring of neurological status     ****Cardiovascular***  71 year old female with Ascending aortic dissection status post modified bentall hemiarch 9/6/2022. 9/27: Patient returned to ICU for presumed sepsis, presenting with tachypnea, fevers, tachycardia. CT Chest on 9/12:  Status post recent ascending aortic dissection repair. Small amount of fluid surrounding the ascending aorta without evidence of enhancing wall to suggest abscess. RUE duplex on 9/12: There is a thrombosed and occluded RIJ. Patient is requiring inotropic support with an IV Dobutamine infusion for acute biventricular systolic heart failure, dose decreased today. IV Nicardipine titrated for hypertension. Invasive hemodynamic monitoring with a central venous catheter & an A-line were required for the continuous central venous and MAP/BP monitoring to ensure adequate cardiovascular support. IV Cardene infusion for hypertension. Continue with PO Mexiletine for rate control        ***Pulmonary***  CT Chest on 9/12: Bilateral lower lobe atelectasis with bilateral pleural effusions. CTA 9/28: RLL consolidation. Reintubated for change in mental status on 9/8, self extubated on 9/9 and reintubated again, extubated to HFO2 on 9/13. 9/27 ReIntubated for airway protection (high peak/plateau pressures on volume control. Switched to Pressure control.) ARDS ? Respiratory status required full ventilatory support, close monitoring of respiratory rate and breathing pattern, the following of ABG’s with A-line monitoring, continuous pulse oximetry monitoring. Respiratory status required pressure control ventilation with Mar at 10ppm, close monitoring of breathing pattern and respiratory rate & the following of continuous pulse oximetry for support & to prevent decompensation. Continue with bronchodilators. Patient may ultimately require tracheostomy due to overall deconditioned state and recurrent respiratory failure.       Mode: PC  RR (machine): 16  FiO2: 40  PEEP: 8  ITime: 1  MAP: 12  PC: 18  PIP: 26              ***Heme***  Anemia due to acute blood loss, no current plan for transfusion. Continue to monitor hemoglobin and hematocrit levels     ***GI***  CT A/P on 9/12: Mild thickening of the cecum and ascending colon possibly representing mild nonspecific proximal colitis. Hepatic cirrhosis. The focal IPMN of the pancreas with large cyst within the tail the pancreas measuring 3.1 cm. Colostomy bag in place, continue to monitor output. Protonix for stress ulcer prophylaxis. Tolerating enteral feeding, Glucerna, at goal 40cc/hr.       ***Renal***  Resolving ROBI, creatinine peaked at 1.7, now improved to 1.01, non oliguric. Optimize renal perfusion with adequate inotropic support and blood pressure and continued monitoring of urine output, fluid balance, electrolytes, and BUN/Creatinine.     ***ID***  Afebrile, white blood cells elevated to 17.57. Continue trending white blood count and monitoring fever curve. SCx on 9/8 +Proteus mirabilis, on 9/27 +Staphylococcus aureus on 9/9+ LE, WBC 60, few yeast  /  UCx on 9/10 NG. BCx on 9/10 NGTD, BCx on 9/12 NGTD 9/16 bcx NGTD, 9/20 NGTD, 9/27 NGTD, 9/28 NGTD. Nystatin for thrush. Broad spectrum antibiotics (meropenem and vancomycin started for presumed sepsis) 9/27. Patient remains febrile and additional cultures sent today.      ***Endocrine***  Metabolic stability, history of diabetes mellitus required an IV regular Insulin drip while following serial glucose levels to help achieve and maintain euglycemia. Patient has a history of adrenal insufficiency, received stress steroids during recent decompensation. Now tapering steroids.          Patient requires continuous monitoring with bedside rhythm monitoring, pulse oximetry monitoring, and continuous central venous and arterial pressure monitoring; and intermittent blood gas analysis. Care plan discussed with the ICU care team.   Patient remained critical, at risk for life threatening decompensation.    I have spent 35 minutes providing critical care management to this patient.    By signing my name below, I, Kieran Plascencia, attest that this documentation has been prepared under the direction and in the presence of Marbella Martin MD   Electronically signed: Georgi Cordero, 09-30-22 @ 11:45    I, Marbella Mario, personally performed the services described in this documentation. all medical record entries made by the scribe were at my direction and in my presence. I have reviewed the chart and agree that the record reflects my personal performance and is accurate and complete  Electronically signed: Marbella Martin MD  HPI:  71F PMH DM, COPD, Chronic Adrenal Insufficiency on Chronic prednisone, history of colorectal cancer s/p resection (colostomy bag), Hx of CAD, Chronic A fib on Eliquis, and tracheomalacia and multiple intubations, recent dx of OM presented to ED at [facility] this morning with epigastric pain, belching and central chest pain. Found on CTA to have type A aortic dissection and transferred to [facility] for surgical evaluation by Dr. Cabrera. (06 Sep 2022 16:32)      Patient seen and examined at the bedside.    Remained critically ill on continuous ICU monitoring.    OBJECTIVE:  ICU Vital Signs Last 24 Hrs  T(C): 36.8 (01 Oct 2022 20:00), Max: 38.3 (01 Oct 2022 00:00)  T(F): 98.2 (01 Oct 2022 20:00), Max: 100.9 (01 Oct 2022 00:00)  HR: 78 (01 Oct 2022 22:30) (66 - 130)  BP: --  BP(mean): --  ABP: 121/51 (01 Oct 2022 22:30) (57/50 - 163/66)  ABP(mean): 75 (01 Oct 2022 22:30) (62 - 104)  RR: 22 (01 Oct 2022 22:30) (16 - 40)  SpO2: 100% (01 Oct 2022 22:30) (96% - 100%)    O2 Parameters below as of 01 Oct 2022 20:00    O2 Concentration (%): 40    Physical Exam:   General: intubated  Neurology: somnolent, but able to follow simple commands  ENT/Neck: Neck supple, trachea midline, No JVD  Respiratory: rhonchi throughout    CV: S1S2, no murmurs        [x] Sinus Tachy  Abdominal: Soft, NT, ND, colostomy in place  Extremities: +4 RUE edema, 3+LUE edema, 2 + pedal edema noted, + peripheral pulses   Skin: Dry dressing over right heel, no Rashes, Hematoma, Ecchymosis                Assessment:  71F PMH DM, COPD, Chronic Adrenal Insufficiency on Chronic prednisone, history of colorectal cancer s/p resection (colostomy bag), Hx of CAD, Chronic A fib on Eliquis, and tracheomalacia and multiple intubations, recent dx of OM presented to ED at [facility] this morning with epigastric pain, belching and central chest pain. Found on CTA to have type A aortic dissection and transferred to [facility] for surgical evaluation by Dr. Cabrera.     Ascending aortic dissection s/p modified bentall and hemiarch on 9/6   Hypovolemic shock   Post op respiratory insufficiency  Acute blood loss anemia  Stress hyperglycemia   RIJ thrombus  Pancreatic cyst    Proteus PNA  Leukocytosis      Plan:   ***Neuro***  Continue close monitoring of neurological status. Patient is somnolent but arousable on a low dose Dexmedetomidine infusion to prevent pulling at lines and tubes.    ****Cardiovascular***  71 year old female with Ascending aortic dissection status post modified bentall hemiarch 9/6/2022. 9/27: Patient returned to ICU for presumed sepsis, presenting with tachypnea, fevers, tachycardia. CT Chest on 9/12:  Status post recent ascending aortic dissection repair. Small amount of fluid surrounding the ascending aorta without evidence of enhancing wall to suggest abscess. RUE duplex on 9/12: There is a thrombosed and occluded RIJ. Patient is requiring inotropic support with an IV Dobutamine infusion for acute biventricular systolic heart failure, dose decreased today. An IV Nicardipine infusion is titrate to manage hypertension. Invasive hemodynamic monitoring with a central venous catheter & an A-line were required for the continuous central venous and MAP/BP monitoring to ensure adequate cardiovascular support. Continue with PO Mexiletine for rhythm control.        ***Pulmonary***  CT Chest on 9/12: Bilateral lower lobe atelectasis with bilateral pleural effusions. CTA 9/28: RLL consolidation. Reintubated for change in mental status on 9/8, self extubated on 9/9 and reintubated again, extubated to HFO2 on 9/13. 9/27 ReIntubated for airway protection (high peak/plateau pressures on volume control. Switched to Pressure control.) ARDS ? Respiratory status required full ventilatory support, close monitoring of respiratory rate and breathing pattern, the following of ABG’s with A-line monitoring, continuous pulse oximetry monitoring. Respiratory status required assist control volume ventilation with Mar at 10ppm, close monitoring of breathing pattern and respiratory rate & the following of continuous pulse oximetry for support & to prevent decompensation. Continue with bronchodilators. Patient may ultimately require tracheostomy due to overall deconditioned state and recurrent respiratory failure.       Mode: PC  RR (machine): 16  FiO2: 40  PEEP: 7  TV: 450 cc              ***Heme***  Anemia due to acute blood loss, no current plan for transfusion. Continue to monitor hemoglobin and hematocrit levels     ***GI***  CT A/P on 9/12: Mild thickening of the cecum and ascending colon possibly representing mild nonspecific proximal colitis. Hepatic cirrhosis. The focal IPMN of the pancreas with large cyst within the tail the pancreas measuring 3.1 cm. Colostomy bag in place, continue to monitor output. Protonix for stress ulcer prophylaxis. Tolerating enteral feeding, Glucerna, at goal 40cc/hr.       ***Renal***  Resolving ROBI, creatinine peaked at 1.7, now improved to 1.01, non oliguric. Optimize renal perfusion with adequate inotropic support and blood pressure and continued monitoring of urine output, fluid balance, electrolytes, and BUN/Creatinine.     ***ID***  Afebrile, white blood cells elevated to 17.57. Continue trending white blood count and monitoring fever curve. SCx on 9/8 +Proteus mirabilis, on 9/27 +Staphylococcus aureus on 9/9+ LE, WBC 60, few yeast  /  UCx on 9/10 NG. BCx on 9/10 NGTD, BCx on 9/12 NGTD 9/16 bcx NGTD, 9/20 NGTD, 9/27 NGTD, 9/28 NGTD. Nystatin for thrush. Broad spectrum antibiotics (meropenem and vancomycin started for presumed sepsis) 9/27. Patient remains febrile and additional cultures sent today.      ***Endocrine***  Metabolic stability, history of diabetes mellitus required an IV regular Insulin drip while following serial glucose levels to help achieve and maintain euglycemia. Patient has a history of adrenal insufficiency, received stress steroids during recent decompensation. Now tapering steroids.          Patient requires continuous monitoring with bedside rhythm monitoring, pulse oximetry monitoring, and continuous central venous and arterial pressure monitoring; and intermittent blood gas analysis. Care plan discussed with the ICU care team.   Patient remained critical, at risk for life threatening decompensation.    I have spent 35 minutes providing critical care management to this patient.    By signing my name below, I, Kieran Plascencia, attest that this documentation has been prepared under the direction and in the presence of Marbella Martin MD   Electronically signed: Georgi Cordero, 09-30-22 @ 11:45    I, Marbella Martin, personally performed the services described in this documentation. all medical record entries made by the scribe were at my direction and in my presence. I have reviewed the chart and agree that the record reflects my personal performance and is accurate and complete  Electronically signed: Marbella Martin MD  HPI:  71F PMH DM, COPD, Chronic Adrenal Insufficiency on Chronic prednisone, history of colorectal cancer s/p resection (colostomy bag), Hx of CAD, Chronic A fib on Eliquis, and tracheomalacia and multiple intubations, recent dx of OM presented to ED at [facility] this morning with epigastric pain, belching and central chest pain. Found on CTA to have type A aortic dissection and transferred to [facility] for surgical evaluation by Dr. Cabrera. (06 Sep 2022 16:32)      Patient seen and examined at the bedside.    Remained critically ill on continuous ICU monitoring.    OBJECTIVE:  ICU Vital Signs Last 24 Hrs  T(C): 36.8 (01 Oct 2022 20:00), Max: 38.3 (01 Oct 2022 00:00)  T(F): 98.2 (01 Oct 2022 20:00), Max: 100.9 (01 Oct 2022 00:00)  HR: 78 (01 Oct 2022 22:30) (66 - 130)  BP: --  BP(mean): --  ABP: 121/51 (01 Oct 2022 22:30) (57/50 - 163/66)  ABP(mean): 75 (01 Oct 2022 22:30) (62 - 104)  RR: 22 (01 Oct 2022 22:30) (16 - 40)  SpO2: 100% (01 Oct 2022 22:30) (96% - 100%)    O2 Parameters below as of 01 Oct 2022 20:00    O2 Concentration (%): 40    Physical Exam:   General: intubated  Neurology: somnolent, but able to follow simple commands  ENT/Neck: Neck supple, trachea midline, No JVD  Respiratory: rhonchi throughout    CV: S1S2, no murmurs        [x] Sinus Tachy  Abdominal: Soft, NT, ND, colostomy in place  Extremities: +4 RUE edema, 3+LUE edema, 2 + pedal edema noted, + peripheral pulses   Skin: Dry dressing over right heel, no Rashes, Hematoma, Ecchymosis                Assessment:  71F PMH DM, COPD, Chronic Adrenal Insufficiency on Chronic prednisone, history of colorectal cancer s/p resection (colostomy bag), Hx of CAD, Chronic A fib on Eliquis, and tracheomalacia and multiple intubations, recent dx of OM presented to ED at [facility] this morning with epigastric pain, belching and central chest pain. Found on CTA to have type A aortic dissection and transferred to [facility] for surgical evaluation by Dr. Cabrera.     Ascending aortic dissection s/p modified bentall and hemiarch on 9/6   Hypovolemic shock   Post op respiratory insufficiency  Acute blood loss anemia  Stress hyperglycemia   RIJ thrombus  Pancreatic cyst    Proteus PNA  Leukocytosis      Plan:   ***Neuro***  Continue close monitoring of neurological status. Patient is somnolent but arousable on a low dose Dexmedetomidine infusion to prevent pulling at lines and tubes.    ****Cardiovascular***  71 year old female with Ascending aortic dissection status post modified bentall hemiarch 9/6/2022. 9/27: Patient returned to ICU for presumed sepsis, presenting with tachypnea, fevers, tachycardia. CT Chest on 9/12:  Status post recent ascending aortic dissection repair. Small amount of fluid surrounding the ascending aorta without evidence of enhancing wall to suggest abscess. RUE duplex on 9/12: There is a thrombosed and occluded RIJ. Patient is requiring inotropic support with an IV Dobutamine infusion for acute biventricular systolic heart failure, dose decreased today. An IV Nicardipine infusion is titrate to manage hypertension. Invasive hemodynamic monitoring with a central venous catheter & an A-line were required for the continuous central venous and MAP/BP monitoring to ensure adequate cardiovascular support. Continue with PO Mexiletine for rhythm control.        ***Pulmonary***  CT Chest on 9/12: Bilateral lower lobe atelectasis with bilateral pleural effusions. CTA 9/28: RLL consolidation. Reintubated for change in mental status on 9/8, self extubated on 9/9 and reintubated again, extubated to HFO2 on 9/13. 9/27 ReIntubated for airway protection (high peak/plateau pressures on volume control. Switched to Pressure control.) ARDS ? Respiratory status required full ventilatory support, close monitoring of respiratory rate and breathing pattern, the following of ABG’s with A-line monitoring, continuous pulse oximetry monitoring. Respiratory status required assist control volume ventilation with Mar at 10ppm, close monitoring of breathing pattern and respiratory rate & the following of continuous pulse oximetry for support & to prevent decompensation. Continue with bronchodilators. Patient may ultimately require tracheostomy due to overall deconditioned state and recurrent respiratory failure.       Mode: PC  RR (machine): 16  FiO2: 40  PEEP: 7  TV: 450 cc              ***Heme***  Anemia due to acute blood loss, no current plan for transfusion. Continue to monitor hemoglobin and hematocrit levels     ***GI***  CT A/P on 9/12: Mild thickening of the cecum and ascending colon possibly representing mild nonspecific proximal colitis. Nodular contour of the liver consistent with hepatic cirrhosis. Multifocal cystic lesions in the pancreas, consistent with intraductal papillary mucinous neoplasms and large cyst within the tail the pancreas measuring 3.1 cm. Colostomy bag in place, continue to monitor output. Protonix for stress ulcer prophylaxis. Tolerating enteral feeding, Glucerna, at goal 40cc/hr.       ***Renal***  Resolving ROBI, creatinine peaked at 1.7, now improved to 1.01, non oliguric. Optimize renal perfusion with adequate inotropic support and blood pressure and continued monitoring of urine output, fluid balance, electrolytes, and BUN/Creatinine.     ***ID***  Afebrile, white blood cells elevated to 19.00. Continue trending white blood count and monitoring fever curve. SCx on 9/8 +Proteus mirabilis, on 9/27 +Staphylococcus aureus on 9/9+ LE, WBC 60, few yeast  /  UCx on 9/10 NG. BCx on 9/10 NGTD, BCx on 9/12 NGTD 9/16 bcx NGTD, 9/20 NGTD, 9/27 NGTD, 9/28 BC MRSA, 9/30 BCx2 NG. Nystatin for thrush. Broad spectrum antibiotics (meropenem and vancomycin started initially for presumed sepsis 9/27 0. Patient with low grade fever today.      ***Endocrine***  Metabolic stability, history of diabetes mellitus required an IV regular Insulin drip while following serial glucose levels to help achieve and maintain euglycemia. Patient has a history of adrenal insufficiency, received stress steroids during recent decompensation. Now tapering steroids.          Patient requires continuous monitoring with bedside rhythm monitoring, pulse oximetry monitoring, and continuous central venous and arterial pressure monitoring; and intermittent blood gas analysis. Care plan discussed with the ICU care team.   Patient remained critical, at risk for life threatening decompensation.    I have spent 45 minutes providing critical care management to this patient.     HPI:  71F PMH DM, COPD, Chronic Adrenal Insufficiency on Chronic prednisone, history of colorectal cancer s/p resection (colostomy bag), Hx of CAD, Chronic A fib on Eliquis, and tracheomalacia and multiple intubations, recent dx of OM presented to ED at [facility] this morning with epigastric pain, belching and central chest pain. Found on CTA to have type A aortic dissection and transferred to [facility] for surgical evaluation by Dr. Cabrera. (06 Sep 2022 16:32)      Patient seen and examined at the bedside.    Remained critically ill on continuous ICU monitoring.    OBJECTIVE:  ICU Vital Signs Last 24 Hrs  T(C): 36.8 (01 Oct 2022 20:00), Max: 38.3 (01 Oct 2022 00:00)  T(F): 98.2 (01 Oct 2022 20:00), Max: 100.9 (01 Oct 2022 00:00)  HR: 78 (01 Oct 2022 22:30) (66 - 130)  BP: --  BP(mean): --  ABP: 121/51 (01 Oct 2022 22:30) (57/50 - 163/66)  ABP(mean): 75 (01 Oct 2022 22:30) (62 - 104)  RR: 22 (01 Oct 2022 22:30) (16 - 40)  SpO2: 100% (01 Oct 2022 22:30) (96% - 100%)    O2 Parameters below as of 01 Oct 2022 20:00    O2 Concentration (%): 40    Physical Exam:   General: intubated  Neurology: somnolent, but able to follow simple commands  ENT/Neck: Neck supple, trachea midline, No JVD  Respiratory: rhonchi throughout    CV: S1S2, no murmurs        [x] Sinus Tachy  Abdominal: Soft, NT, ND, colostomy in place  Extremities: +4 RUE edema, 3+LUE edema, 2 + pedal edema noted, + peripheral pulses   Skin: Dry dressing over right heel, no Rashes, Hematoma, Ecchymosis                Assessment:  71F PMH DM, COPD, Chronic Adrenal Insufficiency on Chronic prednisone, history of colorectal cancer s/p resection (colostomy bag), Hx of CAD, Chronic A fib on Eliquis, and tracheomalacia and multiple intubations, recent dx of OM presented to ED at [facility] this morning with epigastric pain, belching and central chest pain. Found on CTA to have type A aortic dissection and transferred to [facility] for surgical evaluation by Dr. Cabrera.     Ascending aortic dissection s/p modified bentall and hemiarch on 9/6   Hypovolemic shock   Post op respiratory insufficiency  Acute blood loss anemia  Stress hyperglycemia   RIJ thrombus  Pancreatic cyst    Proteus PNA  Leukocytosis      Plan:   ***Neuro***  Continue close monitoring of neurological status. Patient is somnolent but arousable on a low dose Dexmedetomidine infusion to prevent pulling at lines and tubes.    ****Cardiovascular***  71 year old female with Ascending aortic dissection status post modified bentall hemiarch 9/6/2022. 9/27: Patient returned to ICU for presumed sepsis, presenting with tachypnea, fevers, tachycardia. CT Chest on 9/12:  Status post recent ascending aortic dissection repair. Small amount of fluid surrounding the ascending aorta without evidence of enhancing wall to suggest abscess. RUE duplex on 9/12: There is a thrombosed and occluded RIJ. Patient is requiring inotropic support with an IV Dobutamine infusion for acute biventricular systolic heart failure, dose decreased today. An IV Nicardipine infusion is titrate to manage hypertension. Invasive hemodynamic monitoring with a central venous catheter & an A-line were required for the continuous central venous and MAP/BP monitoring to ensure adequate cardiovascular support. Continue with PO Mexiletine for rhythm control.        ***Pulmonary***  CT Chest on 9/12: Bilateral lower lobe atelectasis with bilateral pleural effusions. CTA 9/28: RLL consolidation. Reintubated for change in mental status on 9/8, self extubated on 9/9 and reintubated again, extubated to HFO2 on 9/13. 9/27 ReIntubated for airway protection (high peak/plateau pressures on volume control. Switched to Pressure control.) ARDS ? Respiratory status required full ventilatory support, close monitoring of respiratory rate and breathing pattern, the following of ABG’s with A-line monitoring, continuous pulse oximetry monitoring. Respiratory status required assist control volume ventilation with Mar at 10ppm, close monitoring of breathing pattern and respiratory rate & the following of continuous pulse oximetry for support & to prevent decompensation. Continue with bronchodilators. Patient may ultimately require tracheostomy due to overall deconditioned state and recurrent respiratory failure.       Mode: PC  RR (machine): 16  FiO2: 40  PEEP: 7  TV: 450 cc              ***Heme***  Anemia due to acute blood loss, no current plan for transfusion. Continue to monitor hemoglobin and hematocrit levels     ***GI***  CT A/P on 9/12: Mild thickening of the cecum and ascending colon possibly representing mild nonspecific proximal colitis. Nodular contour of the liver consistent with hepatic cirrhosis. Multifocal cystic lesions in the pancreas, consistent with intraductal papillary mucinous neoplasms and large cyst within the tail the pancreas measuring 3.1 cm. Colostomy bag in place, continue to monitor output. Protonix for stress ulcer prophylaxis. Tolerating enteral feeding, Glucerna, at goal 40cc/hr.       ***Renal***  Resolving ROBI, creatinine peaked at 1.7, now improved to 1.01, non oliguric. Optimize renal perfusion with adequate inotropic support and blood pressure and continued monitoring of urine output, fluid balance, electrolytes, and BUN/Creatinine.     ***ID***  Afebrile, white blood cells elevated to 19.00. Continue trending white blood count and monitoring fever curve. SCx on 9/8 +Proteus mirabilis, on 9/27 +Staphylococcus aureus on 9/9+ LE, WBC 60, few yeast  /  UCx on 9/10 NG. BCx on 9/10 NGTD, BCx on 9/12 NGTD 9/16 bcx NGTD, 9/20 NGTD, 9/27 NGTD, 9/28 BC MRSA, 9/30 BCx2 NG. Nystatin for thrush. Broad spectrum antibiotics (meropenem and vancomycin started initially for presumed sepsis 9/27 0. Patient with low grade fever today. Mediport removed in IR this evening.      ***Endocrine***  Metabolic stability, history of diabetes mellitus required an IV regular Insulin drip while following serial glucose levels to help achieve and maintain euglycemia. Patient has a history of adrenal insufficiency, received stress steroids during recent decompensation. Now tapering steroids.          Patient requires continuous monitoring with bedside rhythm monitoring, pulse oximetry monitoring, and continuous central venous and arterial pressure monitoring; and intermittent blood gas analysis. Care plan discussed with the ICU care team.   Patient remained critical, at risk for life threatening decompensation.    I have spent 45 minutes providing critical care management to this patient.     HPI:  71F PMH DM, COPD, Chronic Adrenal Insufficiency on Chronic prednisone, history of colorectal cancer s/p resection (colostomy bag), Hx of CAD, Chronic A fib on Eliquis, and tracheomalacia and multiple intubations, recent dx of OM presented to ED at [facility] this morning with epigastric pain, belching and central chest pain. Found on CTA to have type A aortic dissection and transferred to [facility] for surgical evaluation by Dr. Cabrera. (06 Sep 2022 16:32)      Patient seen and examined at the bedside.    Remained critically ill on continuous ICU monitoring.    OBJECTIVE:  ICU Vital Signs Last 24 Hrs  T(C): 36.8 (01 Oct 2022 20:00), Max: 38.3 (01 Oct 2022 00:00)  T(F): 98.2 (01 Oct 2022 20:00), Max: 100.9 (01 Oct 2022 00:00)  HR: 78 (01 Oct 2022 22:30) (66 - 130)  BP: --  BP(mean): --  ABP: 121/51 (01 Oct 2022 22:30) (57/50 - 163/66)  ABP(mean): 75 (01 Oct 2022 22:30) (62 - 104)  RR: 22 (01 Oct 2022 22:30) (16 - 40)  SpO2: 100% (01 Oct 2022 22:30) (96% - 100%)    O2 Parameters below as of 01 Oct 2022 20:00    O2 Concentration (%): 40    Physical Exam:   General: intubated  Neurology: somnolent, but able to follow simple commands  ENT/Neck: Neck supple, trachea midline, No JVD  Respiratory: rhonchi throughout    CV: S1S2, no murmurs        [x] Sinus Tachy  Abdominal: Soft, NT, ND, colostomy in place  Extremities: +4 RUE edema, 3+LUE edema, 2 + pedal edema noted, + peripheral pulses   Skin: Dry dressing over right heel, no Rashes, Hematoma, Ecchymosis                Assessment:  71F PMH DM, COPD, Chronic Adrenal Insufficiency on Chronic prednisone, history of colorectal cancer s/p resection (colostomy bag), Hx of CAD, Chronic A fib on Eliquis, and tracheomalacia and multiple intubations, recent dx of OM presented to ED at [facility] this morning with epigastric pain, belching and central chest pain. Found on CTA to have type A aortic dissection and transferred to [facility] for surgical evaluation by Dr. Cabrera.     Ascending aortic dissection s/p modified bentall and hemiarch on 9/6   Hypovolemic shock   Post op respiratory insufficiency  Acute blood loss anemia  Stress hyperglycemia   RIJ thrombus  Pancreatic cyst    Proteus PNA  Leukocytosis      Plan:   ***Neuro***  Continue close monitoring of neurological status. Patient is somnolent but arousable on a low dose Dexmedetomidine infusion to prevent pulling at lines and tubes.    ****Cardiovascular***  71 year old female with Ascending aortic dissection status post modified bentall hemiarch 9/6/2022. 9/27: Patient returned to ICU for presumed sepsis, presenting with tachypnea, fevers, tachycardia. CT Chest on 9/12:  Status post recent ascending aortic dissection repair. Small amount of fluid surrounding the ascending aorta without evidence of enhancing wall to suggest abscess. RUE duplex on 9/12: There is a thrombosed and occluded RIJ. Patient is requiring inotropic support with an IV Dobutamine infusion for acute biventricular systolic heart failure, dose decreased today. An IV Nicardipine infusion is titrate to manage hypertension. Invasive hemodynamic monitoring with a central venous catheter & an A-line were required for the continuous central venous and MAP/BP monitoring to ensure adequate cardiovascular support. Continue with PO Mexiletine for rhythm control.        ***Pulmonary***  CT Chest on 9/12: Bilateral lower lobe atelectasis with bilateral pleural effusions. CTA 9/28: RLL consolidation. Reintubated for change in mental status on 9/8, self extubated on 9/9 and reintubated again, extubated to HFO2 on 9/13. 9/27 ReIntubated for airway protection (high peak/plateau pressures on volume control. Switched to Pressure control.) ARDS ? Respiratory status required full ventilatory support, close monitoring of respiratory rate and breathing pattern, the following of ABG’s with A-line monitoring, continuous pulse oximetry monitoring. Respiratory status required assist control volume ventilation with Mar at 10ppm, close monitoring of breathing pattern and respiratory rate & the following of continuous pulse oximetry for support & to prevent decompensation. Continue with bronchodilators. Patient may ultimately require tracheostomy due to overall deconditioned state and recurrent respiratory failure.       Mode: PC  RR (machine): 16  FiO2: 40  PEEP: 7  TV: 450 cc              ***Heme***  Anemia due to acute blood loss, no current plan for transfusion. Continue to monitor hemoglobin and hematocrit levels     ***GI***  CT A/P on 9/12: Mild thickening of the cecum and ascending colon possibly representing mild nonspecific proximal colitis. Nodular contour of the liver consistent with hepatic cirrhosis. Multifocal cystic lesions in the pancreas, consistent with intraductal papillary mucinous neoplasms and large cyst within the tail the pancreas measuring 3.1 cm. Colostomy bag in place, continue to monitor output. Protonix for stress ulcer prophylaxis. Tolerating enteral feeding, Glucerna, at goal 40cc/hr.       ***Renal***  Resolving ROBI, creatinine peaked at 1.7, now improved to 1.01, non oliguric. Optimize renal perfusion with adequate inotropic support and blood pressure and continued monitoring of urine output, fluid balance, electrolytes, and BUN/Creatinine.     ***ID***  Afebrile, white blood cells elevated to 19.00. Continue trending white blood count and monitoring fever curve. SCx on 9/8 +Proteus mirabilis, on 9/27 +Staphylococcus aureus on 9/9+ LE, WBC 60, few yeast  /  UCx on 9/10 NG. BCx on 9/10 NGTD, BCx on 9/12 NGTD 9/16 bcx NGTD, 9/20 NGTD, 9/27 NGTD, 9/28 BC MRSA, 9/30 BCx2 NG. Nystatin for thrush. Broad spectrum antibiotics (meropenem and vancomycin started initially for presumed sepsis 9/27 0. Patient with low grade fever today. Mediport removed in IR this evening.      ***Endocrine***  Metabolic stability, history of diabetes mellitus required an IV regular Insulin drip while following serial glucose levels to help achieve and maintain euglycemia. Patient has a history of adrenal insufficiency, received stress steroids during recent decompensation. Dose increased after recent taper due to sepsis.          Patient requires continuous monitoring with bedside rhythm monitoring, pulse oximetry monitoring, and continuous central venous and arterial pressure monitoring; and intermittent blood gas analysis. Care plan discussed with the ICU care team.   Patient remained critical, at risk for life threatening decompensation.    I have spent 45 minutes providing critical care management to this patient.

## 2022-10-01 NOTE — CONSULT NOTE ADULT - ASSESSMENT
Assessment: 72y Female with DM, COPD, Chronic Adrenal Insufficiency on Chronic prednisone, history of colorectal cancer s/p resection now with type A dissection s/p repair with bactermia with IR consulted for port removal.    Plan: IR to remove port today, 10/1  -Please place order for IR Procedure, approving attending Dr. Alexis  -NPO  -hold therpaeutic/prophylactic anticoagulants  -COVID PCR within 5 days of procedure  -discussed with primary team    Phill Leigh MD  PGY-V, Interventional Radiology    -Available on [email or web address] TEAMS for all non-urgent questions  -Emergent issues: [facility]-p.[phone]; [facility]-[phone] ([phone])  -Non-emergent consults: Please place a [facility] order "Consult-Interventional Radiology" with an appropriate callback number  -Scheduling questions: [facility]: [phone]; [facility]: [phone]  -Clinic/Outpatient booking: [facility]: [phone]; [facility]: [phone]

## 2022-10-01 NOTE — PROGRESS NOTE ADULT - ASSESSMENT
71 year-old female with a history of DM2, CAD, A-Fib on apixaban, Severe Persistent Asthma (on chronic steroids, recently started on Tezspire), colon cancer s/p resection/chemo, and tracheobronchomalacia s/p tracheoplasty, and recent OM of the R foot s/b debridement and completed course of Vancomycin/Ertapenem for MRSA/ESBL Proteus/Corynebacterium who now presents with chest pain, found to have Type A dissection s/p repair with modified Bentall procedure and hemiarch replacement on 9/6/22. Post-op course complicated by acute hypoxemic respiratory failure, shock, anemia, and hyperglycemia requiring insulin gtt. Extubated 9/13, now awake and alert with mild encephalopathy but overall significantly improved.    Assessment:  Acute hypoxemic respiratory failure requiring intubation  Type A Aortic Dissection  Severe Persistent Asthma  History of Tracheobronchomalacia  fevers and MSSA bacteremia and tracheal aspirate material with MSSA    Fevers and MSSA bacteremia:  Send repeat blood cultures to look for organism clearance  Patient with multiple medication allergy history including PCN and cepholosporins  Continue IV vancomycin- trough 13.1 on 10/1 is therapeutic maintain current dosing  TTE performed 10/1 no evidence of vegetations on the valves EF improving  Agree with removal of mediport  Can continue the Meropenem for now but will plan to discontinue if no other organisms are identified on culture  Continue to follow CBC  Continue to follow creatinine  Continue to follow Vanco trough levels      Discussed with CTU team    Jeff Calixto MD  Can be called via Teams  After 5pm/weekends [phone]

## 2022-10-02 LAB
-  AMPICILLIN/SULBACTAM: SIGNIFICANT CHANGE UP
-  CEFAZOLIN: SIGNIFICANT CHANGE UP
-  CLINDAMYCIN: SIGNIFICANT CHANGE UP
-  DAPTOMYCIN: SIGNIFICANT CHANGE UP
-  ERYTHROMYCIN: SIGNIFICANT CHANGE UP
-  GENTAMICIN: SIGNIFICANT CHANGE UP
-  LINEZOLID: SIGNIFICANT CHANGE UP
-  OXACILLIN: SIGNIFICANT CHANGE UP
-  PENICILLIN: SIGNIFICANT CHANGE UP
-  RIFAMPIN: SIGNIFICANT CHANGE UP
-  TETRACYCLINE: SIGNIFICANT CHANGE UP
-  TRIMETHOPRIM/SULFAMETHOXAZOLE: SIGNIFICANT CHANGE UP
-  VANCOMYCIN: SIGNIFICANT CHANGE UP
ALBUMIN SERPL ELPH-MCNC: 3.8 G/DL — SIGNIFICANT CHANGE UP (ref 3.3–5)
ALP SERPL-CCNC: 246 U/L — HIGH (ref 40–120)
ALT FLD-CCNC: 731 U/L — HIGH (ref 10–45)
ANION GAP SERPL CALC-SCNC: 10 MMOL/L — SIGNIFICANT CHANGE UP (ref 5–17)
AST SERPL-CCNC: 78 U/L — HIGH (ref 10–40)
BASE EXCESS BLDV CALC-SCNC: -0.4 MMOL/L — SIGNIFICANT CHANGE UP (ref -2–3)
BASE EXCESS BLDV CALC-SCNC: -1.2 MMOL/L — SIGNIFICANT CHANGE UP (ref -2–3)
BASE EXCESS BLDV CALC-SCNC: -1.3 MMOL/L — SIGNIFICANT CHANGE UP (ref -2–3)
BASE EXCESS BLDV CALC-SCNC: 3.3 MMOL/L — HIGH (ref -2–3)
BILIRUB SERPL-MCNC: 1.2 MG/DL — SIGNIFICANT CHANGE UP (ref 0.2–1.2)
BLOOD GAS VENOUS - CREATININE: SIGNIFICANT CHANGE UP MG/DL (ref 0.5–1.3)
BUN SERPL-MCNC: 29 MG/DL — HIGH (ref 7–23)
CA-I SERPL-SCNC: 1.12 MMOL/L — LOW (ref 1.15–1.33)
CA-I SERPL-SCNC: 1.16 MMOL/L — SIGNIFICANT CHANGE UP (ref 1.15–1.33)
CA-I SERPL-SCNC: 1.18 MMOL/L — SIGNIFICANT CHANGE UP (ref 1.15–1.33)
CA-I SERPL-SCNC: 1.19 MMOL/L — SIGNIFICANT CHANGE UP (ref 1.15–1.33)
CALCIUM SERPL-MCNC: 8.8 MG/DL — SIGNIFICANT CHANGE UP (ref 8.4–10.5)
CHLORIDE BLDV-SCNC: 117 MMOL/L — HIGH (ref 96–108)
CHLORIDE BLDV-SCNC: 118 MMOL/L — HIGH (ref 96–108)
CHLORIDE BLDV-SCNC: 119 MMOL/L — HIGH (ref 96–108)
CHLORIDE BLDV-SCNC: 120 MMOL/L — HIGH (ref 96–108)
CHLORIDE SERPL-SCNC: 120 MMOL/L — HIGH (ref 96–108)
CO2 BLDV-SCNC: 25 MMOL/L — SIGNIFICANT CHANGE UP (ref 22–26)
CO2 BLDV-SCNC: 25 MMOL/L — SIGNIFICANT CHANGE UP (ref 22–26)
CO2 BLDV-SCNC: 26 MMOL/L — SIGNIFICANT CHANGE UP (ref 22–26)
CO2 BLDV-SCNC: 29 MMOL/L — HIGH (ref 22–26)
CO2 SERPL-SCNC: 25 MMOL/L — SIGNIFICANT CHANGE UP (ref 22–31)
CREAT SERPL-MCNC: 0.51 MG/DL — SIGNIFICANT CHANGE UP (ref 0.5–1.3)
CULTURE RESULTS: SIGNIFICANT CHANGE UP
EGFR: 98 ML/MIN/1.73M2 — SIGNIFICANT CHANGE UP
GAS PNL BLDA: SIGNIFICANT CHANGE UP
GAS PNL BLDV: 146 MMOL/L — HIGH (ref 136–145)
GAS PNL BLDV: 146 MMOL/L — HIGH (ref 136–145)
GAS PNL BLDV: 151 MMOL/L — HIGH (ref 136–145)
GAS PNL BLDV: 153 MMOL/L — HIGH (ref 136–145)
GAS PNL BLDV: SIGNIFICANT CHANGE UP
GLUCOSE BLDC GLUCOMTR-MCNC: 104 MG/DL — HIGH (ref 70–99)
GLUCOSE BLDC GLUCOMTR-MCNC: 107 MG/DL — HIGH (ref 70–99)
GLUCOSE BLDC GLUCOMTR-MCNC: 118 MG/DL — HIGH (ref 70–99)
GLUCOSE BLDC GLUCOMTR-MCNC: 121 MG/DL — HIGH (ref 70–99)
GLUCOSE BLDC GLUCOMTR-MCNC: 130 MG/DL — HIGH (ref 70–99)
GLUCOSE BLDC GLUCOMTR-MCNC: 138 MG/DL — HIGH (ref 70–99)
GLUCOSE BLDC GLUCOMTR-MCNC: 144 MG/DL — HIGH (ref 70–99)
GLUCOSE BLDC GLUCOMTR-MCNC: 146 MG/DL — HIGH (ref 70–99)
GLUCOSE BLDC GLUCOMTR-MCNC: 149 MG/DL — HIGH (ref 70–99)
GLUCOSE BLDC GLUCOMTR-MCNC: 150 MG/DL — HIGH (ref 70–99)
GLUCOSE BLDC GLUCOMTR-MCNC: 160 MG/DL — HIGH (ref 70–99)
GLUCOSE BLDC GLUCOMTR-MCNC: 180 MG/DL — HIGH (ref 70–99)
GLUCOSE BLDC GLUCOMTR-MCNC: 183 MG/DL — HIGH (ref 70–99)
GLUCOSE BLDC GLUCOMTR-MCNC: 188 MG/DL — HIGH (ref 70–99)
GLUCOSE BLDC GLUCOMTR-MCNC: 202 MG/DL — HIGH (ref 70–99)
GLUCOSE BLDC GLUCOMTR-MCNC: 209 MG/DL — HIGH (ref 70–99)
GLUCOSE BLDC GLUCOMTR-MCNC: 89 MG/DL — SIGNIFICANT CHANGE UP (ref 70–99)
GLUCOSE BLDC GLUCOMTR-MCNC: 91 MG/DL — SIGNIFICANT CHANGE UP (ref 70–99)
GLUCOSE BLDC GLUCOMTR-MCNC: 93 MG/DL — SIGNIFICANT CHANGE UP (ref 70–99)
GLUCOSE BLDV-MCNC: 130 MG/DL — HIGH (ref 70–99)
GLUCOSE BLDV-MCNC: 152 MG/DL — HIGH (ref 70–99)
GLUCOSE BLDV-MCNC: 212 MG/DL — HIGH (ref 70–99)
GLUCOSE BLDV-MCNC: 84 MG/DL — SIGNIFICANT CHANGE UP (ref 70–99)
GLUCOSE SERPL-MCNC: 93 MG/DL — SIGNIFICANT CHANGE UP (ref 70–99)
HCO3 BLDV-SCNC: 24 MMOL/L — SIGNIFICANT CHANGE UP (ref 22–29)
HCO3 BLDV-SCNC: 24 MMOL/L — SIGNIFICANT CHANGE UP (ref 22–29)
HCO3 BLDV-SCNC: 25 MMOL/L — SIGNIFICANT CHANGE UP (ref 22–29)
HCO3 BLDV-SCNC: 28 MMOL/L — SIGNIFICANT CHANGE UP (ref 22–29)
HCT VFR BLD CALC: 30.1 % — LOW (ref 34.5–45)
HCT VFR BLDA CALC: 24 % — LOW (ref 34.5–46.5)
HCT VFR BLDA CALC: 26 % — LOW (ref 34.5–46.5)
HCT VFR BLDA CALC: 28 % — LOW (ref 34.5–46.5)
HCT VFR BLDA CALC: 29 % — LOW (ref 34.5–46.5)
HGB BLD CALC-MCNC: 8 G/DL — LOW (ref 11.7–16.1)
HGB BLD CALC-MCNC: 8.7 G/DL — LOW (ref 11.7–16.1)
HGB BLD CALC-MCNC: 9.3 G/DL — LOW (ref 11.7–16.1)
HGB BLD CALC-MCNC: 9.6 G/DL — LOW (ref 11.7–16.1)
HGB BLD-MCNC: 9.2 G/DL — LOW (ref 11.5–15.5)
HOROWITZ INDEX BLDV+IHG-RTO: 40 — SIGNIFICANT CHANGE UP
LACTATE BLDV-MCNC: 1.2 MMOL/L — SIGNIFICANT CHANGE UP (ref 0.5–2)
LACTATE BLDV-MCNC: 1.4 MMOL/L — SIGNIFICANT CHANGE UP (ref 0.5–2)
LACTATE BLDV-MCNC: 1.4 MMOL/L — SIGNIFICANT CHANGE UP (ref 0.5–2)
LACTATE BLDV-MCNC: 2.1 MMOL/L — HIGH (ref 0.5–2)
MAGNESIUM SERPL-MCNC: 2.3 MG/DL — SIGNIFICANT CHANGE UP (ref 1.6–2.6)
MCHC RBC-ENTMCNC: 24.3 PG — LOW (ref 27–34)
MCHC RBC-ENTMCNC: 30.6 GM/DL — LOW (ref 32–36)
MCV RBC AUTO: 79.6 FL — LOW (ref 80–100)
METHOD TYPE: SIGNIFICANT CHANGE UP
NRBC # BLD: 4 /100 WBCS — HIGH (ref 0–0)
ORGANISM # SPEC MICROSCOPIC CNT: SIGNIFICANT CHANGE UP
PCO2 BLDV: 38 MMHG — LOW (ref 39–42)
PCO2 BLDV: 40 MMHG — SIGNIFICANT CHANGE UP (ref 39–42)
PCO2 BLDV: 41 MMHG — SIGNIFICANT CHANGE UP (ref 39–42)
PCO2 BLDV: 42 MMHG — SIGNIFICANT CHANGE UP (ref 39–42)
PH BLDV: 7.38 — SIGNIFICANT CHANGE UP (ref 7.32–7.43)
PH BLDV: 7.38 — SIGNIFICANT CHANGE UP (ref 7.32–7.43)
PH BLDV: 7.4 — SIGNIFICANT CHANGE UP (ref 7.32–7.43)
PH BLDV: 7.44 — HIGH (ref 7.32–7.43)
PHOSPHATE SERPL-MCNC: 2.7 MG/DL — SIGNIFICANT CHANGE UP (ref 2.5–4.5)
PLATELET # BLD AUTO: 75 K/UL — LOW (ref 150–400)
PO2 BLDV: 45 MMHG — SIGNIFICANT CHANGE UP (ref 25–45)
PO2 BLDV: 46 MMHG — HIGH (ref 25–45)
PO2 BLDV: 46 MMHG — HIGH (ref 25–45)
PO2 BLDV: 54 MMHG — HIGH (ref 25–45)
POTASSIUM BLDA-SCNC: 4.3 MMOL/L — SIGNIFICANT CHANGE UP (ref 3.5–5.1)
POTASSIUM BLDV-SCNC: 3.5 MMOL/L — SIGNIFICANT CHANGE UP (ref 3.5–5.1)
POTASSIUM BLDV-SCNC: 3.5 MMOL/L — SIGNIFICANT CHANGE UP (ref 3.5–5.1)
POTASSIUM BLDV-SCNC: 4.1 MMOL/L — SIGNIFICANT CHANGE UP (ref 3.5–5.1)
POTASSIUM BLDV-SCNC: 4.3 MMOL/L — SIGNIFICANT CHANGE UP (ref 3.5–5.1)
POTASSIUM SERPL-MCNC: 3.3 MMOL/L — LOW (ref 3.5–5.3)
POTASSIUM SERPL-SCNC: 3.3 MMOL/L — LOW (ref 3.5–5.3)
PROT SERPL-MCNC: 6.5 G/DL — SIGNIFICANT CHANGE UP (ref 6–8.3)
RBC # BLD: 3.78 M/UL — LOW (ref 3.8–5.2)
RBC # FLD: 25.8 % — HIGH (ref 10.3–14.5)
SAO2 % BLDV: 73 % — SIGNIFICANT CHANGE UP (ref 67–88)
SAO2 % BLDV: 76.3 % — SIGNIFICANT CHANGE UP (ref 67–88)
SAO2 % BLDV: 76.6 % — SIGNIFICANT CHANGE UP (ref 67–88)
SAO2 % BLDV: 82.9 % — SIGNIFICANT CHANGE UP (ref 67–88)
SARS-COV-2 RNA SPEC QL NAA+PROBE: SIGNIFICANT CHANGE UP
SODIUM SERPL-SCNC: 155 MMOL/L — HIGH (ref 135–145)
SPECIMEN SOURCE: SIGNIFICANT CHANGE UP
VANCOMYCIN TROUGH SERPL-MCNC: 10.4 UG/ML — SIGNIFICANT CHANGE UP (ref 10–20)
VANCOMYCIN TROUGH SERPL-MCNC: 11.9 UG/ML — SIGNIFICANT CHANGE UP (ref 10–20)
WBC # BLD: 15.33 K/UL — HIGH (ref 3.8–10.5)
WBC # FLD AUTO: 15.33 K/UL — HIGH (ref 3.8–10.5)

## 2022-10-02 PROCEDURE — 99292 CRITICAL CARE ADDL 30 MIN: CPT

## 2022-10-02 PROCEDURE — 99233 SBSQ HOSP IP/OBS HIGH 50: CPT

## 2022-10-02 PROCEDURE — 99291 CRITICAL CARE FIRST HOUR: CPT

## 2022-10-02 PROCEDURE — 71045 X-RAY EXAM CHEST 1 VIEW: CPT | Mod: 26,76

## 2022-10-02 PROCEDURE — 99232 SBSQ HOSP IP/OBS MODERATE 35: CPT | Mod: FS

## 2022-10-02 PROCEDURE — 93010 ELECTROCARDIOGRAM REPORT: CPT

## 2022-10-02 RX ORDER — AMIODARONE HYDROCHLORIDE 400 MG/1
1 TABLET ORAL
Qty: 900 | Refills: 0 | Status: DISCONTINUED | OUTPATIENT
Start: 2022-10-02 | End: 2022-10-02

## 2022-10-02 RX ORDER — DEXMEDETOMIDINE HYDROCHLORIDE IN 0.9% SODIUM CHLORIDE 4 UG/ML
0.5 INJECTION INTRAVENOUS
Qty: 200 | Refills: 0 | Status: DISCONTINUED | OUTPATIENT
Start: 2022-10-02 | End: 2022-10-10

## 2022-10-02 RX ORDER — DIGOXIN 250 MCG
125 TABLET ORAL DAILY
Refills: 0 | Status: DISCONTINUED | OUTPATIENT
Start: 2022-10-03 | End: 2022-10-10

## 2022-10-02 RX ORDER — POTASSIUM PHOSPHATE, MONOBASIC POTASSIUM PHOSPHATE, DIBASIC 236; 224 MG/ML; MG/ML
15 INJECTION, SOLUTION INTRAVENOUS ONCE
Refills: 0 | Status: COMPLETED | OUTPATIENT
Start: 2022-10-02 | End: 2022-10-02

## 2022-10-02 RX ORDER — MAGNESIUM SULFATE 500 MG/ML
2 VIAL (ML) INJECTION ONCE
Refills: 0 | Status: COMPLETED | OUTPATIENT
Start: 2022-10-02 | End: 2022-10-02

## 2022-10-02 RX ORDER — POTASSIUM CHLORIDE 20 MEQ
40 PACKET (EA) ORAL ONCE
Refills: 0 | Status: COMPLETED | OUTPATIENT
Start: 2022-10-02 | End: 2022-10-02

## 2022-10-02 RX ORDER — ENOXAPARIN SODIUM 100 MG/ML
40 INJECTION SUBCUTANEOUS EVERY 24 HOURS
Refills: 0 | Status: DISCONTINUED | OUTPATIENT
Start: 2022-10-02 | End: 2022-10-05

## 2022-10-02 RX ORDER — AMIODARONE HYDROCHLORIDE 400 MG/1
150 TABLET ORAL ONCE
Refills: 0 | Status: COMPLETED | OUTPATIENT
Start: 2022-10-02 | End: 2022-10-02

## 2022-10-02 RX ORDER — POTASSIUM CHLORIDE 20 MEQ
10 PACKET (EA) ORAL
Refills: 0 | Status: COMPLETED | OUTPATIENT
Start: 2022-10-02 | End: 2022-10-02

## 2022-10-02 RX ORDER — NICARDIPINE HYDROCHLORIDE 30 MG/1
5 CAPSULE, EXTENDED RELEASE ORAL
Qty: 40 | Refills: 0 | Status: DISCONTINUED | OUTPATIENT
Start: 2022-10-02 | End: 2022-10-03

## 2022-10-02 RX ORDER — DIGOXIN 250 MCG
500 TABLET ORAL ONCE
Refills: 0 | Status: COMPLETED | OUTPATIENT
Start: 2022-10-02 | End: 2022-10-02

## 2022-10-02 RX ORDER — VANCOMYCIN HCL 1 G
750 VIAL (EA) INTRAVENOUS ONCE
Refills: 0 | Status: COMPLETED | OUTPATIENT
Start: 2022-10-02 | End: 2022-10-02

## 2022-10-02 RX ORDER — DIGOXIN 250 MCG
250 TABLET ORAL EVERY 4 HOURS
Refills: 0 | Status: DISCONTINUED | OUTPATIENT
Start: 2022-10-02 | End: 2022-10-02

## 2022-10-02 RX ORDER — PROPOFOL 10 MG/ML
12.44 INJECTION, EMULSION INTRAVENOUS
Qty: 500 | Refills: 0 | Status: DISCONTINUED | OUTPATIENT
Start: 2022-10-02 | End: 2022-10-06

## 2022-10-02 RX ORDER — SODIUM CHLORIDE 9 MG/ML
1000 INJECTION, SOLUTION INTRAVENOUS
Refills: 0 | Status: DISCONTINUED | OUTPATIENT
Start: 2022-10-02 | End: 2022-10-02

## 2022-10-02 RX ORDER — HYDROMORPHONE HYDROCHLORIDE 2 MG/ML
0.5 INJECTION INTRAMUSCULAR; INTRAVENOUS; SUBCUTANEOUS ONCE
Refills: 0 | Status: DISCONTINUED | OUTPATIENT
Start: 2022-10-02 | End: 2022-10-02

## 2022-10-02 RX ORDER — VANCOMYCIN HCL 1 G
1000 VIAL (EA) INTRAVENOUS EVERY 12 HOURS
Refills: 0 | Status: COMPLETED | OUTPATIENT
Start: 2022-10-02 | End: 2022-10-09

## 2022-10-02 RX ORDER — VANCOMYCIN HCL 1 G
VIAL (EA) INTRAVENOUS
Refills: 0 | Status: COMPLETED | OUTPATIENT
Start: 2022-10-02 | End: 2022-10-09

## 2022-10-02 RX ADMIN — HYDROMORPHONE HYDROCHLORIDE 0.5 MILLIGRAM(S): 2 INJECTION INTRAMUSCULAR; INTRAVENOUS; SUBCUTANEOUS at 04:20

## 2022-10-02 RX ADMIN — AMIODARONE HYDROCHLORIDE 600 MILLIGRAM(S): 400 TABLET ORAL at 04:09

## 2022-10-02 RX ADMIN — Medication 500 MICROGRAM(S): at 06:51

## 2022-10-02 RX ADMIN — Medication 3 MILLILITER(S): at 11:28

## 2022-10-02 RX ADMIN — Medication 250 MILLIGRAM(S): at 18:47

## 2022-10-02 RX ADMIN — Medication 3.05 MICROGRAM(S)/KG/MIN: at 19:52

## 2022-10-02 RX ADMIN — PANTOPRAZOLE SODIUM 40 MILLIGRAM(S): 20 TABLET, DELAYED RELEASE ORAL at 12:37

## 2022-10-02 RX ADMIN — Medication 500000 UNIT(S): at 17:19

## 2022-10-02 RX ADMIN — MEXILETINE HYDROCHLORIDE 200 MILLIGRAM(S): 150 CAPSULE ORAL at 21:32

## 2022-10-02 RX ADMIN — Medication 100 MILLIGRAM(S): at 13:56

## 2022-10-02 RX ADMIN — CHLORHEXIDINE GLUCONATE 15 MILLILITER(S): 213 SOLUTION TOPICAL at 17:19

## 2022-10-02 RX ADMIN — AMIODARONE HYDROCHLORIDE 33.3 MG/MIN: 400 TABLET ORAL at 04:10

## 2022-10-02 RX ADMIN — DEXMEDETOMIDINE HYDROCHLORIDE IN 0.9% SODIUM CHLORIDE 8.38 MICROGRAM(S)/KG/HR: 4 INJECTION INTRAVENOUS at 19:52

## 2022-10-02 RX ADMIN — AMIODARONE HYDROCHLORIDE 600 MILLIGRAM(S): 400 TABLET ORAL at 02:27

## 2022-10-02 RX ADMIN — HYDROMORPHONE HYDROCHLORIDE 0.5 MILLIGRAM(S): 2 INJECTION INTRAMUSCULAR; INTRAVENOUS; SUBCUTANEOUS at 04:09

## 2022-10-02 RX ADMIN — Medication 50 MILLIEQUIVALENT(S): at 01:07

## 2022-10-02 RX ADMIN — Medication 1 TABLET(S): at 12:37

## 2022-10-02 RX ADMIN — MEROPENEM 100 MILLIGRAM(S): 1 INJECTION INTRAVENOUS at 05:25

## 2022-10-02 RX ADMIN — Medication 100 GRAM(S): at 12:37

## 2022-10-02 RX ADMIN — Medication 500000 UNIT(S): at 12:37

## 2022-10-02 RX ADMIN — INSULIN HUMAN 3 UNIT(S)/HR: 100 INJECTION, SOLUTION SUBCUTANEOUS at 19:53

## 2022-10-02 RX ADMIN — Medication 50 MILLIEQUIVALENT(S): at 06:50

## 2022-10-02 RX ADMIN — MEXILETINE HYDROCHLORIDE 200 MILLIGRAM(S): 150 CAPSULE ORAL at 13:56

## 2022-10-02 RX ADMIN — PROPOFOL 5 MICROGRAM(S)/KG/MIN: 10 INJECTION, EMULSION INTRAVENOUS at 19:53

## 2022-10-02 RX ADMIN — CHLORHEXIDINE GLUCONATE 15 MILLILITER(S): 213 SOLUTION TOPICAL at 05:46

## 2022-10-02 RX ADMIN — CHLORHEXIDINE GLUCONATE 1 APPLICATION(S): 213 SOLUTION TOPICAL at 05:46

## 2022-10-02 RX ADMIN — Medication 3 MILLILITER(S): at 05:52

## 2022-10-02 RX ADMIN — Medication 250 MILLIGRAM(S): at 08:30

## 2022-10-02 RX ADMIN — Medication 40 MILLIEQUIVALENT(S): at 13:56

## 2022-10-02 RX ADMIN — SODIUM CHLORIDE 50 MILLILITER(S): 9 INJECTION INTRAMUSCULAR; INTRAVENOUS; SUBCUTANEOUS at 19:50

## 2022-10-02 RX ADMIN — Medication 100 MILLIGRAM(S): at 05:28

## 2022-10-02 RX ADMIN — CHLORHEXIDINE GLUCONATE 1 APPLICATION(S): 213 SOLUTION TOPICAL at 20:00

## 2022-10-02 RX ADMIN — Medication 0.5 MILLIGRAM(S): at 05:53

## 2022-10-02 RX ADMIN — Medication 50 MILLIEQUIVALENT(S): at 06:23

## 2022-10-02 RX ADMIN — Medication 1 APPLICATION(S): at 12:37

## 2022-10-02 RX ADMIN — Medication 3 MILLILITER(S): at 17:13

## 2022-10-02 RX ADMIN — POTASSIUM PHOSPHATE, MONOBASIC POTASSIUM PHOSPHATE, DIBASIC 62.5 MILLIMOLE(S): 236; 224 INJECTION, SOLUTION INTRAVENOUS at 01:33

## 2022-10-02 RX ADMIN — Medication 0.5 MILLIGRAM(S): at 17:13

## 2022-10-02 RX ADMIN — ENOXAPARIN SODIUM 40 MILLIGRAM(S): 100 INJECTION SUBCUTANEOUS at 12:00

## 2022-10-02 RX ADMIN — Medication 50 MILLIEQUIVALENT(S): at 01:33

## 2022-10-02 RX ADMIN — Medication 50 MILLIEQUIVALENT(S): at 02:04

## 2022-10-02 RX ADMIN — Medication 500000 UNIT(S): at 05:47

## 2022-10-02 RX ADMIN — Medication 100 MILLIGRAM(S): at 21:35

## 2022-10-02 RX ADMIN — SODIUM CHLORIDE 50 MILLILITER(S): 9 INJECTION INTRAMUSCULAR; INTRAVENOUS; SUBCUTANEOUS at 19:54

## 2022-10-02 RX ADMIN — Medication 3 MILLILITER(S): at 00:59

## 2022-10-02 RX ADMIN — Medication 500000 UNIT(S): at 01:08

## 2022-10-02 RX ADMIN — NICARDIPINE HYDROCHLORIDE 25 MG/HR: 30 CAPSULE, EXTENDED RELEASE ORAL at 19:16

## 2022-10-02 NOTE — PROGRESS NOTE ADULT - NUTRITIONAL ASSESSMENT
Diet, NPO with Tube Feed:   Tube Feeding Modality: Nasogastric  Glucerna 1.5 Chago (GLUCERNA1.5RTH)  Total Volume for 24 Hours (mL): 1440  Continuous  Starting Tube Feed Rate {mL per Hour}: 60  Until Goal Tube Feed Rate (mL per Hour): 60  Tube Feed Duration (in Hours): 24  Tube Feed Start Time: 10:00 (10-02-22 @ 11:39) [Active]

## 2022-10-02 NOTE — PROGRESS NOTE ADULT - SUBJECTIVE AND OBJECTIVE BOX
seen earlier today     Chief Complaint: Type 2 Diabetes Mellitus     INTERVAL HX:       Review of Systems:  General: As above  Cardiovascular: No chest pain  Respiratory: No SOB  GI: No nausea, vomiting  Endocrine: no  S&Sx of hypoglycemia    Allergies    aspirin (Short breath)  Avelox (Short breath; Pruritus)  cefepime (Anaphylaxis)  codeine (Short breath)  Dilaudid (Short breath)  iodine (Short breath; Swelling)  penicillin (Anaphylaxis)  shellfish (Anaphylaxis)  tetanus toxoid (Short breath)  Valium (Short breath)    Intolerances      MEDICATIONS  (STANDING):  albuterol/ipratropium for Nebulization 3 milliLiter(s) Nebulizer every 6 hours  buDESOnide    Inhalation Suspension 0.5 milliGRAM(s) Inhalation every 12 hours  chlorhexidine 0.12% Liquid 15 milliLiter(s) Oral Mucosa every 12 hours  chlorhexidine 2% Cloths 1 Application(s) Topical <User Schedule>  collagenase Ointment 1 Application(s) Topical daily  dextrose 5%. 1000 milliLiter(s) (100 mL/Hr) IV Continuous <Continuous>  DOBUTamine Infusion 1.5 MICROgram(s)/kG/Min (3.05 mL/Hr) IV Continuous <Continuous>  enoxaparin Injectable 40 milliGRAM(s) SubCutaneous every 24 hours  hydrocortisone sodium succinate Injectable 100 milliGRAM(s) IV Push every 8 hours  insulin regular Infusion 3 Unit(s)/Hr (3 mL/Hr) IV Continuous <Continuous>  mexiletine 200 milliGRAM(s) Oral every 8 hours  multivitamin 1 Tablet(s) Oral daily  nystatin    Suspension 377681 Unit(s) Oral every 6 hours  pantoprazole  Injectable 40 milliGRAM(s) IV Push daily  propofol Infusion 12.438 MICROgram(s)/kG/Min (5 mL/Hr) IV Continuous <Continuous>  sodium chloride 0.9%. 1000 milliLiter(s) (50 mL/Hr) IV Continuous <Continuous>  vancomycin  IVPB 1000 milliGRAM(s) IV Intermittent every 12 hours  vancomycin  IVPB      vasopressin Infusion 0.04 Unit(s)/Min (6 mL/Hr) IV Continuous <Continuous>        hydrocortisone sodium succinate Injectable   100 milliGRAM(s) IV Push (10-02-22 @ 13:56)   100 milliGRAM(s) IV Push (10-02-22 @ 05:28)   100 milliGRAM(s) IV Push (10-01-22 @ 21:02)        PHYSICAL EXAM:  VITALS: T(C): 36.2 (10-02-22 @ 12:00)  T(F): 97.2 (10-02-22 @ 12:00), Max: 100 (10-02-22 @ 04:15)  HR: 69 (10-02-22 @ 15:00) (59 - 138)  BP: --  RR:  (18 - 35)  SpO2:  (98% - 100%)  Wt(kg): --  GENERAL: in NAD  Respiratory: Respirations unlabored   Extremities: Warm, no edema  NEURO: Alert , appropriate       LABS:    POCT Blood Glucose.: 149 mg/dL (10-02-22 @ 14:06)  POCT Blood Glucose.: 89 mg/dL (10-02-22 @ 13:12)  POCT Blood Glucose.: 146 mg/dL (10-02-22 @ 10:49)  POCT Blood Glucose.: 160 mg/dL (10-02-22 @ 10:09)  POCT Blood Glucose.: 130 mg/dL (10-02-22 @ 08:09)  POCT Blood Glucose.: 138 mg/dL (10-02-22 @ 06:52)  POCT Blood Glucose.: 202 mg/dL (10-02-22 @ 04:52)  POCT Blood Glucose.: 188 mg/dL (10-02-22 @ 03:55)  POCT Blood Glucose.: 209 mg/dL (10-02-22 @ 03:20)  POCT Blood Glucose.: 144 mg/dL (10-02-22 @ 01:50)  POCT Blood Glucose.: 121 mg/dL (10-02-22 @ 01:09)  POCT Blood Glucose.: 93 mg/dL (10-01-22 @ 23:59)  POCT Blood Glucose.: 123 mg/dL (10-01-22 @ 22:48)  POCT Blood Glucose.: 157 mg/dL (10-01-22 @ 21:47)  POCT Blood Glucose.: 194 mg/dL (10-01-22 @ 20:57)  POCT Blood Glucose.: 206 mg/dL (10-01-22 @ 19:53)  POCT Blood Glucose.: 108 mg/dL (10-01-22 @ 16:05)  POCT Blood Glucose.: 122 mg/dL (10-01-22 @ 15:20)  POCT Blood Glucose.: 160 mg/dL (10-01-22 @ 14:05)  POCT Blood Glucose.: 158 mg/dL (10-01-22 @ 13:09)  POCT Blood Glucose.: 175 mg/dL (10-01-22 @ 12:22)  POCT Blood Glucose.: 181 mg/dL (10-01-22 @ 11:01)  POCT Blood Glucose.: 144 mg/dL (10-01-22 @ 09:50)  POCT Blood Glucose.: 97 mg/dL (10-01-22 @ 08:54)  POCT Blood Glucose.: 100 mg/dL (10-01-22 @ 07:49)  POCT Blood Glucose.: 102 mg/dL (10-01-22 @ 06:58)  POCT Blood Glucose.: 134 mg/dL (10-01-22 @ 05:59)  POCT Blood Glucose.: 173 mg/dL (10-01-22 @ 04:58)  POCT Blood Glucose.: 199 mg/dL (10-01-22 @ 03:58)  POCT Blood Glucose.: 201 mg/dL (10-01-22 @ 02:53)  POCT Blood Glucose.: 206 mg/dL (10-01-22 @ 02:00)  POCT Blood Glucose.: 112 mg/dL (09-30-22 @ 23:46)  POCT Blood Glucose.: 121 mg/dL (09-30-22 @ 22:46)  POCT Blood Glucose.: 133 mg/dL (09-30-22 @ 22:10)  POCT Blood Glucose.: 164 mg/dL (09-30-22 @ 20:54)  POCT Blood Glucose.: 192 mg/dL (09-30-22 @ 19:52)  POCT Blood Glucose.: 163 mg/dL (09-30-22 @ 19:14)  POCT Blood Glucose.: 122 mg/dL (09-30-22 @ 18:40)  POCT Blood Glucose.: 97 mg/dL (09-30-22 @ 17:58)  POCT Blood Glucose.: 115 mg/dL (09-30-22 @ 17:13)  POCT Blood Glucose.: 143 mg/dL (09-30-22 @ 16:08)  POCT Blood Glucose.: 137 mg/dL (09-30-22 @ 15:15)  POCT Blood Glucose.: 151 mg/dL (09-30-22 @ 14:22)  POCT Blood Glucose.: 164 mg/dL (09-30-22 @ 13:08)  POCT Blood Glucose.: 141 mg/dL (09-30-22 @ 12:11)  POCT Blood Glucose.: 121 mg/dL (09-30-22 @ 09:56)  POCT Blood Glucose.: 113 mg/dL (09-30-22 @ 08:58)  POCT Blood Glucose.: 107 mg/dL (09-30-22 @ 07:59)  POCT Blood Glucose.: 110 mg/dL (09-30-22 @ 06:46)  POCT Blood Glucose.: 123 mg/dL (09-30-22 @ 06:06)  POCT Blood Glucose.: 186 mg/dL (09-30-22 @ 04:54)  POCT Blood Glucose.: 198 mg/dL (09-30-22 @ 04:14)  POCT Blood Glucose.: 185 mg/dL (09-30-22 @ 02:47)  POCT Blood Glucose.: 209 mg/dL (09-30-22 @ 01:55)  POCT Blood Glucose.: 164 mg/dL (09-30-22 @ 00:54)  POCT Blood Glucose.: 120 mg/dL (09-29-22 @ 23:53)  POCT Blood Glucose.: 97 mg/dL (09-29-22 @ 23:13)  POCT Blood Glucose.: 91 mg/dL (09-29-22 @ 22:48)  POCT Blood Glucose.: 112 mg/dL (09-29-22 @ 21:51)  POCT Blood Glucose.: 115 mg/dL (09-29-22 @ 21:05)  POCT Blood Glucose.: 136 mg/dL (09-29-22 @ 19:57)  POCT Blood Glucose.: 161 mg/dL (09-29-22 @ 18:51)  POCT Blood Glucose.: 209 mg/dL (09-29-22 @ 17:55)  POCT Blood Glucose.: 181 mg/dL (09-29-22 @ 16:57)                            9.2    15.33 )-----------( 75       ( 02 Oct 2022 00:37 )             30.1       10-02    155<H>  |  120<H>  |  29<H>  ----------------------------<  93  3.3<L>   |  25  |  0.51    Ca    8.8      02 Oct 2022 00:36  Phos  2.7     10-02  Mg     2.3     10-02    TPro  6.5  /  Alb  3.8  /  TBili  1.2  /  DBili  x   /  AST  78<H>  /  ALT  731<H>  /  AlkPhos  246<H>  10-02      eGFR: 98 mL/min/1.73m2 (02 Oct 2022 00:36)          Thyroid Function Tests:  09-06 @ 16:46 TSH 3.30 FreeT4 -- T3 -- Anti TPO -- Anti Thyroglobulin Ab -- TSI --          A1C with Estimated Average Glucose Result: 9.4 % (09-06-22 @ 16:46)  A1C with Estimated Average Glucose Result: 9.2 % (07-11-22 @ 07:36)      Estimated Average Glucose: 223 mg/dL (09-06-22 @ 16:46)  Estimated Average Glucose: 217 mg/dL (07-11-22 @ 07:36)                          seen earlier today     Chief Complaint: Type 2 Diabetes Mellitus     INTERVAL HX: on/off tf, team wishes to continue hct at 100mg q8h 2/2 critically ill steroid was increased back on 9/30, remains intubated/sedated; on d 5 gtt for hypernatremia       Review of Systems:  General: As above  Cardiovascular: No chest pain  Respiratory: No SOB  GI: No nausea, vomiting  Endocrine: no  S&Sx of hypoglycemia    Allergies    aspirin (Short breath)  Avelox (Short breath; Pruritus)  cefepime (Anaphylaxis)  codeine (Short breath)  Dilaudid (Short breath)  iodine (Short breath; Swelling)  penicillin (Anaphylaxis)  shellfish (Anaphylaxis)  tetanus toxoid (Short breath)  Valium (Short breath)    Intolerances      MEDICATIONS  (STANDING):  albuterol/ipratropium for Nebulization 3 milliLiter(s) Nebulizer every 6 hours  buDESOnide    Inhalation Suspension 0.5 milliGRAM(s) Inhalation every 12 hours  chlorhexidine 0.12% Liquid 15 milliLiter(s) Oral Mucosa every 12 hours  chlorhexidine 2% Cloths 1 Application(s) Topical <User Schedule>  collagenase Ointment 1 Application(s) Topical daily  dextrose 5%. 1000 milliLiter(s) (100 mL/Hr) IV Continuous <Continuous>  DOBUTamine Infusion 1.5 MICROgram(s)/kG/Min (3.05 mL/Hr) IV Continuous <Continuous>  enoxaparin Injectable 40 milliGRAM(s) SubCutaneous every 24 hours  hydrocortisone sodium succinate Injectable 100 milliGRAM(s) IV Push every 8 hours  insulin regular Infusion 3 Unit(s)/Hr (3 mL/Hr) IV Continuous <Continuous>  mexiletine 200 milliGRAM(s) Oral every 8 hours  multivitamin 1 Tablet(s) Oral daily  nystatin    Suspension 166191 Unit(s) Oral every 6 hours  pantoprazole  Injectable 40 milliGRAM(s) IV Push daily  propofol Infusion 12.438 MICROgram(s)/kG/Min (5 mL/Hr) IV Continuous <Continuous>  sodium chloride 0.9%. 1000 milliLiter(s) (50 mL/Hr) IV Continuous <Continuous>  vancomycin  IVPB 1000 milliGRAM(s) IV Intermittent every 12 hours  vancomycin  IVPB      vasopressin Infusion 0.04 Unit(s)/Min (6 mL/Hr) IV Continuous <Continuous>        hydrocortisone sodium succinate Injectable   100 milliGRAM(s) IV Push (10-02-22 @ 13:56)   100 milliGRAM(s) IV Push (10-02-22 @ 05:28)   100 milliGRAM(s) IV Push (10-01-22 @ 21:02)        PHYSICAL EXAM:  VITALS: T(C): 36.2 (10-02-22 @ 12:00)  T(F): 97.2 (10-02-22 @ 12:00), Max: 100 (10-02-22 @ 04:15)  HR: 69 (10-02-22 @ 15:00) (59 - 138)  BP: --  RR:  (18 - 35)  SpO2:  (98% - 100%)  Wt(kg): --  GENERAL: female laying in bed  in NAD  Respiratory: ventilator  Extremities: Warm  NEURO: sedated      LABS:    POCT Blood Glucose.: 149 mg/dL (10-02-22 @ 14:06)  POCT Blood Glucose.: 89 mg/dL (10-02-22 @ 13:12)  POCT Blood Glucose.: 146 mg/dL (10-02-22 @ 10:49)  POCT Blood Glucose.: 160 mg/dL (10-02-22 @ 10:09)  POCT Blood Glucose.: 130 mg/dL (10-02-22 @ 08:09)  POCT Blood Glucose.: 138 mg/dL (10-02-22 @ 06:52)  POCT Blood Glucose.: 202 mg/dL (10-02-22 @ 04:52)  POCT Blood Glucose.: 188 mg/dL (10-02-22 @ 03:55)  POCT Blood Glucose.: 209 mg/dL (10-02-22 @ 03:20)  POCT Blood Glucose.: 144 mg/dL (10-02-22 @ 01:50)  POCT Blood Glucose.: 121 mg/dL (10-02-22 @ 01:09)  POCT Blood Glucose.: 93 mg/dL (10-01-22 @ 23:59)  POCT Blood Glucose.: 123 mg/dL (10-01-22 @ 22:48)  POCT Blood Glucose.: 157 mg/dL (10-01-22 @ 21:47)  POCT Blood Glucose.: 194 mg/dL (10-01-22 @ 20:57)  POCT Blood Glucose.: 206 mg/dL (10-01-22 @ 19:53)  POCT Blood Glucose.: 108 mg/dL (10-01-22 @ 16:05)  POCT Blood Glucose.: 122 mg/dL (10-01-22 @ 15:20)  POCT Blood Glucose.: 160 mg/dL (10-01-22 @ 14:05)  POCT Blood Glucose.: 158 mg/dL (10-01-22 @ 13:09)  POCT Blood Glucose.: 175 mg/dL (10-01-22 @ 12:22)  POCT Blood Glucose.: 181 mg/dL (10-01-22 @ 11:01)  POCT Blood Glucose.: 144 mg/dL (10-01-22 @ 09:50)  POCT Blood Glucose.: 97 mg/dL (10-01-22 @ 08:54)  POCT Blood Glucose.: 100 mg/dL (10-01-22 @ 07:49)  POCT Blood Glucose.: 102 mg/dL (10-01-22 @ 06:58)  POCT Blood Glucose.: 134 mg/dL (10-01-22 @ 05:59)  POCT Blood Glucose.: 173 mg/dL (10-01-22 @ 04:58)  POCT Blood Glucose.: 199 mg/dL (10-01-22 @ 03:58)  POCT Blood Glucose.: 201 mg/dL (10-01-22 @ 02:53)  POCT Blood Glucose.: 206 mg/dL (10-01-22 @ 02:00)  POCT Blood Glucose.: 112 mg/dL (09-30-22 @ 23:46)  POCT Blood Glucose.: 121 mg/dL (09-30-22 @ 22:46)  POCT Blood Glucose.: 133 mg/dL (09-30-22 @ 22:10)  POCT Blood Glucose.: 164 mg/dL (09-30-22 @ 20:54)  POCT Blood Glucose.: 192 mg/dL (09-30-22 @ 19:52)  POCT Blood Glucose.: 163 mg/dL (09-30-22 @ 19:14)  POCT Blood Glucose.: 122 mg/dL (09-30-22 @ 18:40)  POCT Blood Glucose.: 97 mg/dL (09-30-22 @ 17:58)  POCT Blood Glucose.: 115 mg/dL (09-30-22 @ 17:13)  POCT Blood Glucose.: 143 mg/dL (09-30-22 @ 16:08)  POCT Blood Glucose.: 137 mg/dL (09-30-22 @ 15:15)  POCT Blood Glucose.: 151 mg/dL (09-30-22 @ 14:22)  POCT Blood Glucose.: 164 mg/dL (09-30-22 @ 13:08)  POCT Blood Glucose.: 141 mg/dL (09-30-22 @ 12:11)  POCT Blood Glucose.: 121 mg/dL (09-30-22 @ 09:56)  POCT Blood Glucose.: 113 mg/dL (09-30-22 @ 08:58)  POCT Blood Glucose.: 107 mg/dL (09-30-22 @ 07:59)  POCT Blood Glucose.: 110 mg/dL (09-30-22 @ 06:46)  POCT Blood Glucose.: 123 mg/dL (09-30-22 @ 06:06)  POCT Blood Glucose.: 186 mg/dL (09-30-22 @ 04:54)  POCT Blood Glucose.: 198 mg/dL (09-30-22 @ 04:14)  POCT Blood Glucose.: 185 mg/dL (09-30-22 @ 02:47)  POCT Blood Glucose.: 209 mg/dL (09-30-22 @ 01:55)  POCT Blood Glucose.: 164 mg/dL (09-30-22 @ 00:54)  POCT Blood Glucose.: 120 mg/dL (09-29-22 @ 23:53)  POCT Blood Glucose.: 97 mg/dL (09-29-22 @ 23:13)  POCT Blood Glucose.: 91 mg/dL (09-29-22 @ 22:48)  POCT Blood Glucose.: 112 mg/dL (09-29-22 @ 21:51)  POCT Blood Glucose.: 115 mg/dL (09-29-22 @ 21:05)  POCT Blood Glucose.: 136 mg/dL (09-29-22 @ 19:57)  POCT Blood Glucose.: 161 mg/dL (09-29-22 @ 18:51)  POCT Blood Glucose.: 209 mg/dL (09-29-22 @ 17:55)  POCT Blood Glucose.: 181 mg/dL (09-29-22 @ 16:57)                            9.2    15.33 )-----------( 75       ( 02 Oct 2022 00:37 )             30.1       10-02    155<H>  |  120<H>  |  29<H>  ----------------------------<  93  3.3<L>   |  25  |  0.51    Ca    8.8      02 Oct 2022 00:36  Phos  2.7     10-02  Mg     2.3     10-02    TPro  6.5  /  Alb  3.8  /  TBili  1.2  /  DBili  x   /  AST  78<H>  /  ALT  731<H>  /  AlkPhos  246<H>  10-02      eGFR: 98 mL/min/1.73m2 (02 Oct 2022 00:36)          Thyroid Function Tests:  09-06 @ 16:46 TSH 3.30 FreeT4 -- T3 -- Anti TPO -- Anti Thyroglobulin Ab -- TSI --          A1C with Estimated Average Glucose Result: 9.4 % (09-06-22 @ 16:46)  A1C with Estimated Average Glucose Result: 9.2 % (07-11-22 @ 07:36)      Estimated Average Glucose: 223 mg/dL (09-06-22 @ 16:46)  Estimated Average Glucose: 217 mg/dL (07-11-22 @ 07:36)

## 2022-10-02 NOTE — PROGRESS NOTE ADULT - SUBJECTIVE AND OBJECTIVE BOX
CHIEF COMPLAINT: f/up sob, chronic resp failure, TBM, severe persistent asthma, VC dysfunction, Type A aortic dissection s/p repair w/modified "Bentall procedure and hemiarch replacement 9/6-vented and sedated on nitrous-40%-more responsive    Interval Events:     REVIEW OF SYSTEMS:  Constitutional: No fevers or chills. No weight loss. No fatigue or generalized malaise.  Eyes: No itching or discharge from the eyes  ENT: No ear pain. No ear discharge. No nasal congestion. No post nasal drip. No epistaxis. No throat pain. No sore throat. No difficulty swallowing.   CV: No chest pain. No palpitations. No lightheadedness or dizziness.   Resp: No dyspnea at rest. No dyspnea on exertion. No orthopnea. No wheezing. No cough. No stridor. No sputum production. No chest pain with respiration.  GI: No nausea. No vomiting. No diarrhea.  MSK: No joint pain or pain in any extremities  Integumentary: No skin lesions. No pedal edema.  Neurological: No gross motor weakness. No sensory changes.  [ ] All other systems negative  [+ ] Unable to assess ROS because __on vent /semi sedated______    OBJECTIVE:  ICU Vital Signs Last 24 Hrs  T(C): 38 (30 Sep 2022 04:00), Max: 38 (30 Sep 2022 04:00)  T(F): 100.4 (30 Sep 2022 04:00), Max: 100.4 (30 Sep 2022 04:00)  HR: 102 (30 Sep 2022 05:14) (91 - 117)  BP: 137/64 (29 Sep 2022 06:30) (137/64 - 151/67)  BP(mean): 92 (29 Sep 2022 06:30) (92 - 96)  ABP: 137/48 (30 Sep 2022 05:00) (107/45 - 165/65)  ABP(mean): 74 (30 Sep 2022 05:00) (64 - 99)  RR: 19 (30 Sep 2022 05:00) (10 - 31)  SpO2: 98% (30 Sep 2022 05:14) (98% - 100%)    O2 Parameters below as of 30 Sep 2022 05:14  Patient On (Oxygen Delivery Method): ventilator          Mode: AC/ CMV (Assist Control/ Continuous Mandatory Ventilation), RR (machine): 16, FiO2: 40, PEEP: 8, ITime: 1, MAP: 12, PC: 18, PIP: 25    09-28 @ 07:01 - 09-29 @ 07:00  --------------------------------------------------------  IN: 2342.9 mL / OUT: 1965 mL / NET: 377.9 mL    09-29 @ 07:01 - 09-30 @ 05:21  --------------------------------------------------------  IN: 1789.2 mL / OUT: 2110 mL / NET: -320.8 mL      CAPILLARY BLOOD GLUCOSE  185 (30 Sep 2022 05:00)      POCT Blood Glucose.: 186 mg/dL (30 Sep 2022 04:54)      PHYSICAL EXAM:  General: semi-sedated  HEENT: Atraumatic, normocephalic.                 Mallampatti Grade 3                No nasal congestion.                No tonsillar or pharyngeal exudates.  Lymph Nodes: No palpable lymphadenopathy  Neck: No JVD. No carotid bruit.   Respiratory: Normal chest expansion                         Normal percussion                         Normal and equal air entry                         No wheeze, rhonchi or rales.  Cardiovascular: S1 S2 normal. No murmurs, rubs or gallops.   Abdomen: Soft, non-tender, non-distended. No organomegaly. Normoactive bowel sounds.  Extremities: Warm to touch. Peripheral pulse palpable. No pedal edema.   Skin: No rashes or skin lesions  Neurological: Motor and sensory examination unable.  Psychiatry: unable    HOSPITAL MEDICATIONS:  MEDICATIONS  (STANDING):  albuterol/ipratropium for Nebulization 3 milliLiter(s) Nebulizer every 6 hours  buDESOnide    Inhalation Suspension 0.5 milliGRAM(s) Inhalation every 12 hours  chlorhexidine 2% Cloths 1 Application(s) Topical <User Schedule>  collagenase Ointment 1 Application(s) Topical daily  DOBUTamine Infusion 5 MICROgram(s)/kG/Min (10.2 mL/Hr) IV Continuous <Continuous>  hydrocortisone sodium succinate Injectable 50 milliGRAM(s) IV Push every 8 hours  insulin regular Infusion 3 Unit(s)/Hr (3 mL/Hr) IV Continuous <Continuous>  meropenem  IVPB 1000 milliGRAM(s) IV Intermittent every 12 hours  meropenem  IVPB      mexiletine 200 milliGRAM(s) Oral every 8 hours  montelukast 10 milliGRAM(s) Oral at bedtime  multivitamin 1 Tablet(s) Oral daily  niCARdipine Infusion 5 mG/Hr (25 mL/Hr) IV Continuous <Continuous>  nystatin    Suspension 780317 Unit(s) Oral every 6 hours  pantoprazole  Injectable 40 milliGRAM(s) IV Push daily  sodium chloride 0.9%. 1000 milliLiter(s) (50 mL/Hr) IV Continuous <Continuous>  vancomycin  IVPB 1000 milliGRAM(s) IV Intermittent every 24 hours  vasopressin Infusion 0.04 Unit(s)/Min (6 mL/Hr) IV Continuous <Continuous>    MEDICATIONS  (PRN):  simethicone drops 80 milliGRAM(s) Oral every 12 hours PRN Gas      LABS:                        9.1    17.57 )-----------( 101      ( 30 Sep 2022 00:25 )             28.4     09-30    148<H>  |  111<H>  |  47<H>  ----------------------------<  131<H>  3.3<L>   |  23  |  1.01    Ca    9.5      30 Sep 2022 00:24  Phos  2.2     09-30  Mg     2.4     09-30    TPro  6.5  /  Alb  3.6  /  TBili  2.0<H>  /  DBili  x   /  AST  734<H>  /  ALT  1742<H>  /  AlkPhos  164<H>  09-30    PT/INR - ( 30 Sep 2022 00:24 )   PT: 19.4 sec;   INR: 1.66 ratio         PTT - ( 30 Sep 2022 00:24 )  PTT:29.0 sec    Arterial Blood Gas:  09-30 @ 00:00  7.52/31/185/25/98.9/2.7  ABG lactate: --  Arterial Blood Gas:  09-29 @ 17:00  7.50/31/110/24/98.1/1.3  ABG lactate: --  Arterial Blood Gas:  09-29 @ 15:05  7.53/31/103/26/98.7/3.3  ABG lactate: --  Arterial Blood Gas:  09-29 @ 09:44  7.53/32/127/27/98.7/4.1  ABG lactate: --  Arterial Blood Gas:  09-29 @ 04:47  7.49/35/113/27/98.3/3.3  ABG lactate: --  Arterial Blood Gas:  09-29 @ 00:00  7.46/37/88/26/97.3/2.4  ABG lactate: --  Arterial Blood Gas:  09-28 @ 21:14  7.44/38/118/26/98.3/1.6  ABG lactate: --  Arterial Blood Gas:  09-28 @ 18:06  7.45/38/96/26/98.6/2.3  ABG lactate: --  Arterial Blood Gas:  09-28 @ 14:45  7.47/37/95/27/97.9/3.1  ABG lactate: --  Arterial Blood Gas:  09-28 @ 13:30  7.54/29/92/25/97.7/2.5  ABG lactate: --  Arterial Blood Gas:  09-28 @ 09:50  7.51/28/100/22/98.7/-0.2  ABG lactate: --  Arterial Blood Gas:  09-28 @ 05:24  7.50/28/98/22/97.9/-0.8  ABG lactate: --    Venous Blood Gas:  09-30 @ 00:00  7.50/33/53/26/87.3  VBG Lactate: 1.6  Venous Blood Gas:  09-29 @ 15:05  7.46/31/53/22/86.7  VBG Lactate: 2.2  Venous Blood Gas:  09-29 @ 09:44  7.48/35/59/26/90.1  VBG Lactate: 2.5  Venous Blood Gas:  09-29 @ 00:00  7.42/40/52/26/80.5  VBG Lactate: 4.5      MICROBIOLOGY:     RADIOLOGY:  [ ] Reviewed and interpreted by me    Point of Care Ultrasound Findings:    PFT:    EKG: CHIEF COMPLAINT: f/up sob, chronic resp failure, TBM, severe persistent asthma, VC dysfunction, Type A aortic dissection s/p repair w/modified "Bentall procedure and hemiarch replacement 9/6-vented and sedated on nitrous-40%-more responsive    Interval Events: Pt remains sedated, now on propofol. Digoxin started, amiodarone drip and mexiletine continued. Weaned off Mar, remains on ventilator. Abx adjusted, completing meropenem.    REVIEW OF SYSTEMS:  Constitutional: No fevers or chills. No weight loss. No fatigue or generalized malaise.  Eyes: No itching or discharge from the eyes  ENT: No ear pain. No ear discharge. No nasal congestion. No post nasal drip. No epistaxis. No throat pain. No sore throat. No difficulty swallowing.   CV: No chest pain. No palpitations. No lightheadedness or dizziness.   Resp: No dyspnea at rest. No dyspnea on exertion. No orthopnea. No wheezing. No cough. No stridor. No sputum production. No chest pain with respiration.  GI: No nausea. No vomiting. No diarrhea.  MSK: No joint pain or pain in any extremities  Integumentary: No skin lesions. No pedal edema.  Neurological: No gross motor weakness. No sensory changes.  [ ] All other systems negative  [+ ] Unable to assess ROS because __on vent /semi sedated______    OBJECTIVE:  ICU Vital Signs Last 24 Hrs  T(C): 38 (30 Sep 2022 04:00), Max: 38 (30 Sep 2022 04:00)  T(F): 100.4 (30 Sep 2022 04:00), Max: 100.4 (30 Sep 2022 04:00)  HR: 102 (30 Sep 2022 05:14) (91 - 117)  BP: 137/64 (29 Sep 2022 06:30) (137/64 - 151/67)  BP(mean): 92 (29 Sep 2022 06:30) (92 - 96)  ABP: 137/48 (30 Sep 2022 05:00) (107/45 - 165/65)  ABP(mean): 74 (30 Sep 2022 05:00) (64 - 99)  RR: 19 (30 Sep 2022 05:00) (10 - 31)  SpO2: 98% (30 Sep 2022 05:14) (98% - 100%)    O2 Parameters below as of 30 Sep 2022 05:14  Patient On (Oxygen Delivery Method): ventilator          Mode: AC/ CMV (Assist Control/ Continuous Mandatory Ventilation), RR (machine): 16, FiO2: 40, PEEP: 8, ITime: 1, MAP: 12, PC: 18, PIP: 25    09-28 @ 07:01  - 09-29 @ 07:00  --------------------------------------------------------  IN: 2342.9 mL / OUT: 1965 mL / NET: 377.9 mL    09-29 @ 07:01  -  09-30 @ 05:21  --------------------------------------------------------  IN: 1789.2 mL / OUT: 2110 mL / NET: -320.8 mL      CAPILLARY BLOOD GLUCOSE  185 (30 Sep 2022 05:00)      POCT Blood Glucose.: 186 mg/dL (30 Sep 2022 04:54)      PHYSICAL EXAM:  General: semi-sedated  HEENT: Atraumatic, normocephalic.                 Mallampatti Grade 3                No nasal congestion.                No tonsillar or pharyngeal exudates.  Lymph Nodes: No palpable lymphadenopathy  Neck: No JVD. No carotid bruit.   Respiratory: Normal chest expansion                         Normal percussion                         Normal and equal air entry                         No wheeze, rhonchi or rales.  Cardiovascular: S1 S2 normal. No murmurs, rubs or gallops.   Abdomen: Soft, non-tender, non-distended. No organomegaly. Normoactive bowel sounds.  Extremities: Warm to touch. Peripheral pulse palpable. No pedal edema.   Skin: No rashes or skin lesions  Neurological: Motor and sensory examination unable.  Psychiatry: unable    HOSPITAL MEDICATIONS:  MEDICATIONS  (STANDING):  albuterol/ipratropium for Nebulization 3 milliLiter(s) Nebulizer every 6 hours  buDESOnide    Inhalation Suspension 0.5 milliGRAM(s) Inhalation every 12 hours  chlorhexidine 2% Cloths 1 Application(s) Topical <User Schedule>  collagenase Ointment 1 Application(s) Topical daily  DOBUTamine Infusion 5 MICROgram(s)/kG/Min (10.2 mL/Hr) IV Continuous <Continuous>  hydrocortisone sodium succinate Injectable 50 milliGRAM(s) IV Push every 8 hours  insulin regular Infusion 3 Unit(s)/Hr (3 mL/Hr) IV Continuous <Continuous>  meropenem  IVPB 1000 milliGRAM(s) IV Intermittent every 12 hours  meropenem  IVPB      mexiletine 200 milliGRAM(s) Oral every 8 hours  montelukast 10 milliGRAM(s) Oral at bedtime  multivitamin 1 Tablet(s) Oral daily  niCARdipine Infusion 5 mG/Hr (25 mL/Hr) IV Continuous <Continuous>  nystatin    Suspension 192058 Unit(s) Oral every 6 hours  pantoprazole  Injectable 40 milliGRAM(s) IV Push daily  sodium chloride 0.9%. 1000 milliLiter(s) (50 mL/Hr) IV Continuous <Continuous>  vancomycin  IVPB 1000 milliGRAM(s) IV Intermittent every 24 hours  vasopressin Infusion 0.04 Unit(s)/Min (6 mL/Hr) IV Continuous <Continuous>    MEDICATIONS  (PRN):  simethicone drops 80 milliGRAM(s) Oral every 12 hours PRN Gas      LABS:                        9.1    17.57 )-----------( 101      ( 30 Sep 2022 00:25 )             28.4     09-30    148<H>  |  111<H>  |  47<H>  ----------------------------<  131<H>  3.3<L>   |  23  |  1.01    Ca    9.5      30 Sep 2022 00:24  Phos  2.2     09-30  Mg     2.4     09-30    TPro  6.5  /  Alb  3.6  /  TBili  2.0<H>  /  DBili  x   /  AST  734<H>  /  ALT  1742<H>  /  AlkPhos  164<H>  09-30    PT/INR - ( 30 Sep 2022 00:24 )   PT: 19.4 sec;   INR: 1.66 ratio         PTT - ( 30 Sep 2022 00:24 )  PTT:29.0 sec    Arterial Blood Gas:  09-30 @ 00:00  7.52/31/185/25/98.9/2.7  ABG lactate: --  Arterial Blood Gas:  09-29 @ 17:00  7.50/31/110/24/98.1/1.3  ABG lactate: --  Arterial Blood Gas:  09-29 @ 15:05  7.53/31/103/26/98.7/3.3  ABG lactate: --  Arterial Blood Gas:  09-29 @ 09:44  7.53/32/127/27/98.7/4.1  ABG lactate: --  Arterial Blood Gas:  09-29 @ 04:47  7.49/35/113/27/98.3/3.3  ABG lactate: --  Arterial Blood Gas:  09-29 @ 00:00  7.46/37/88/26/97.3/2.4  ABG lactate: --  Arterial Blood Gas:  09-28 @ 21:14  7.44/38/118/26/98.3/1.6  ABG lactate: --  Arterial Blood Gas:  09-28 @ 18:06  7.45/38/96/26/98.6/2.3  ABG lactate: --  Arterial Blood Gas:  09-28 @ 14:45  7.47/37/95/27/97.9/3.1  ABG lactate: --  Arterial Blood Gas:  09-28 @ 13:30  7.54/29/92/25/97.7/2.5  ABG lactate: --  Arterial Blood Gas:  09-28 @ 09:50  7.51/28/100/22/98.7/-0.2  ABG lactate: --  Arterial Blood Gas:  09-28 @ 05:24  7.50/28/98/22/97.9/-0.8  ABG lactate: --    Venous Blood Gas:  09-30 @ 00:00  7.50/33/53/26/87.3  VBG Lactate: 1.6  Venous Blood Gas:  09-29 @ 15:05  7.46/31/53/22/86.7  VBG Lactate: 2.2  Venous Blood Gas:  09-29 @ 09:44  7.48/35/59/26/90.1  VBG Lactate: 2.5  Venous Blood Gas:  09-29 @ 00:00  7.42/40/52/26/80.5  VBG Lactate: 4.5      MICROBIOLOGY:     RADIOLOGY:  [ ] Reviewed and interpreted by me    Point of Care Ultrasound Findings:    PFT:    EKG:

## 2022-10-02 NOTE — PROGRESS NOTE ADULT - ASSESSMENT
71F w/h/o uncontrolled T2DM (A1C 9.4%) on basal/bolus insulin PTA. Unknown DM complications. Also COPD, secondary adrenal Insufficiency on chronic steroids, colorectal cancer s/p resection (colostomy bag), Chronic A fib on Eliquis, and tracheomalacia and multiple intubations, recent dx of OM presented to ED at [facility] with epigastric pain, belching and central chest pain. Found on CTA to have type A aortic dissection and transferred to [facility] for surgical evaluation by Dr. Cabrera. Now s/p aortic dissection repair on 9/07/22. Endocrine consulted for assistance with uncontrolled DM/steroids. Pt in ICU with ventricular dysfunction/sepsis intubated off pressors and on/off TF requiring insulin drip. On stress steroid doses due to present critical condition and h/o adrenal insufficiency.           Problem/Plan - 1:  ·  Problem: Type 2 diabetes mellitus with hyperglycemia.   -BG Goal 100-180mg/dl   -test BG hourly while on insulin gtt. Once off gtt can check q6h if NPO/TF and AC/HS/2AM daily once eating again  -C/w Insulin drip to BG goal 100 to 180s. if hyperglycemic Would consider stating 3-4 units which is average dose needed instead of starting 5 to 7 units which can place pt at risk for hypoglycemia.   -Discontinue TFs/PO diet orderwhile pt is npo. order  Still active  -Contact endo team once pt is more stable to restart SQ insulin again  Discharge plan:  - Likely to discharge patient home on basal/bolus insulin. Final regimen pending clinical course.  - Recommend routine outpatient ophthalmology, podiatry  - Can f/u with endocrinologist Dr. Saavedra.  - Make sure pt has Rx for all DM supplies and insulin/ DM meds.  -Based on pt's clinical condition and mental status at this time she is not able to independently manage her DM. Will evaluate pt's ability to manage DM at time of discharge. If unable> pt will need family/ care giver (s) to manage DM care at home  -Will need rehab.     Problem/Plan - 2:  ·  Problem: Adrenal insufficiency.   ·  Plan: On chronic steroids at home: medrol 8mg qd   titrated to HCT 50mg q8h on  9/29, Steroid re titrated by team to 100mg q8h on 9/30 for critical illness, d/w Endo Attg Dr Stevenson today recommend HCT 50mg q8h if hemodynamically stable  , reassess daily prior to tapering .  pt is off pressors consider start slow taper back to oral Prednisone chronic dose.  suggested taper , tbd daily depending on pts hemodynamic stability-  day 1 HCT 50mg q8h x 24hour   day 2 HCT 25 mg q8h x24 hours   day 3 HCT 20 mg at 8am, HCT 10mg  at 1600  day 4 HCT 10mg at 8 am, HCT 10mg at 1600  day 5 Prednisone 8mg qd     Problem/Plan - 3:  ·  Problem: Hyperlipidemia.   ·  Plan: Off rosuvastatin 5mg daily  Re start if not contraindicated and as per cardiology  -F/u levels as out pt.    Discussed with patient and primary  team [place] NP  Contact via [employer] Teams during business hours  On evenings and weekends, please call [phone] or page endocrine fellow on call.   Email: [email or web address]   Please note that this patient may be followed by different provider tomorrow.    greater than 50% of the encounter was spent counseling and/or coordination of care.  26 minutes spent on total encounter; The necessity of the time spent during the encounter on this date of service was due to development of plan of care/coordination of care/glycemic control through review of labs, blood glucose values and vital signs.

## 2022-10-02 NOTE — PROGRESS NOTE ADULT - SUBJECTIVE AND OBJECTIVE BOX
Patient seen and examined at the bedside.    Remained critically ill on continuous ICU monitoring.    OBJECTIVE:  Vital Signs Last 24 Hrs  T(C): 37.8 (02 Oct 2022 04:15), Max: 37.9 (01 Oct 2022 08:00)  T(F): 100 (02 Oct 2022 04:15), Max: 100.2 (01 Oct 2022 08:00)  HR: 88 (02 Oct 2022 06:45) (66 - 138)  BP: --  BP(mean): --  RR: 29 (02 Oct 2022 06:45) (16 - 35)  SpO2: 100% (02 Oct 2022 06:45) (97% - 100%)    Parameters below as of 02 Oct 2022 04:15  Patient On (Oxygen Delivery Method): ventilator    O2 Concentration (%): 40      Assessment:  71F PMH DM, COPD, Chronic Adrenal Insufficiency on Chronic prednisone, history of colorectal cancer s/p resection (colostomy bag), Hx of CAD, Chronic A fib on Eliquis, and tracheomalacia and multiple intubations, recent dx of OM presented to ED at [facility] this morning with epigastric pain, belching and central chest pain. Found on CTA to have type A aortic dissection and transferred to [facility] for surgical evaluation by Dr. Cabrera.     Ascending aortic dissection s/p modified bentall and hemiarch on 9/6   Hypovolemic shock   Cardiogenic shock  Post op respiratory insufficiency  Acute blood loss anemia  Lactic Acidosis  Thrombocytopenia  RIJ thrombus  Pancreatic cyst    Proteus PNA  Leukocytosis      Plan:   ***Neuro***  [x] Sedated with [x] Precedex - transitioned to Propofol  Post operative neuro assessment     ***Cardiovascular***  TTE on 9/28: EF 41%, No left ventricular thrombus. Moderate global left ventricular systolic dysfunction. Grossly right ventricular enlargement with decreased right ventricular systolic function.  9/27: Patient returned to ICU for presumed sepsis, presenting with tachypnea, fevers, tachycardia    A fib/ CVP 18 / MAP 90/ Hct 30.1 /  Lactate 2.2  [x] Dobutamine- 2.5 -> 1.25 mcgs   Continue Mexiletine for VT   Continue Digoxin   Rate control with Amiodarone gtt  CT Chest on 9/12:  Status post recent ascending aortic dissection repair. Small amount of fluid surrounding the ascending aorta without evidence of enhancing wall to suggest abscess.  RUE duplex on 9/12: There is a thrombosed and occluded RIJ  Continuous reassessment of hemodynamics      ***Pulmonary***  CT Chest on 9/12: Bilateral lower lobe atelectasis with bilateral pleural effusions   CTA 9/28: RLL consolidation  Reintubated for change in mental status on 9/8, self extubated on 9/9 and reintubated again, extubated to HFO2 on 9/13 9/27 ReIntubated for airway protection (high peak/plateau pressures on volume control. Switched to Pressure control.) ARDS ?   Full vent support / Mar 10ppm   Hx of COPD / Continue bronchodilators  Monitor secretions and suction PRN     Mode: AC/ CMV (Assist Control/ Continuous Mandatory Ventilation)  RR (machine): 16  TV (machine): 450  FiO2: 40  PEEP: 7  ITime: 0.8  MAP: 10  PIP: 20              ***GI***  CT A/P on 9/12: Mild thickening of the cecum and ascending colon possibly representing mild nonspecific proximal colitis. Hepatic cirrhosis. The focal IPMN of the pancreas with large cyst within the tail the pancreas measuring 3.1 cm.  Colostomy bag in place, continue to monitor output   Passed FEES on 9/20, CC diet with reg liquids, pt with decreased appetite need to encourage PO intake    [x] Diet: TFs Glucerna 1.5 @ goal rate of 40 cc/hr  [x] Protonix   Simethicone PRN gas      ***Renal***  Continue to monitor I/Os, BUN/Creatinine.   Replete lytes PRN  Restarting Free water 300 q6  Amezcua present       ***ID***  SCx on 9/8 +Proteus mirabilis, on 9/27 +Staphylococcus aureus  UA on 9/9+ LE, WBC 60, few yeast  /  UCx on 9/10 NG   BCx on 9/28 + Gram Positive Cocci in Clusters   BCx on 10/1 NGTD  Nystatin for thrush   Broad spectrum antibiotics (Meropenem and Vancomycin for presumed sepsis) 9/27      ***Endocrine***  [x]  DM : HbA1c 9.4%                - [x] Insulin gtt              - Need tight glycemic control to prevent wound infection.  Endo following for recs now that patient has some PO intake with tube feed supplementation         Patient requires continuous monitoring with bedside rhythm monitoring, pulse oximetry monitoring, and continuous central venous and arterial pressure monitoring; and intermittent blood gas analysis. Care plan discussed with the ICU care team.   Patient remained critical, at risk for life threatening decompensation.    I have spent 30 minutes providing critical care management to this patient.    By signing my name below, I, Maria D'Amico, attest that this documentation has been prepared under the direction and in the presence of Bj Abbott MD   Electronically signed: Maria D'Amico, Scribe, 10-02-22 @ 06:57    I, Bj Abbott, personally performed the services described in this documentation. all medical record entries made by the marcyibe were at my direction and in my presence. I have reviewed the chart and agree that the record reflects my personal performance and is accurate and complete  Electronically signed: Bj Abbott MD  Patient seen and examined at the bedside.    Remained critically ill on continuous ICU monitoring.    OBJECTIVE:  Vital Signs Last 24 Hrs  T(C): 37.8 (02 Oct 2022 04:15), Max: 37.9 (01 Oct 2022 08:00)  T(F): 100 (02 Oct 2022 04:15), Max: 100.2 (01 Oct 2022 08:00)  HR: 88 (02 Oct 2022 06:45) (66 - 138)  BP: --  BP(mean): --  RR: 29 (02 Oct 2022 06:45) (16 - 35)  SpO2: 100% (02 Oct 2022 06:45) (97% - 100%)    Parameters below as of 02 Oct 2022 04:15  Patient On (Oxygen Delivery Method): ventilator    O2 Concentration (%): 40      Assessment:  71F PMH DM, COPD, Chronic Adrenal Insufficiency on Chronic prednisone, history of colorectal cancer s/p resection (colostomy bag), Hx of CAD, Chronic A fib on Eliquis, and tracheomalacia and multiple intubations, recent dx of OM presented to ED at [facility] this morning with epigastric pain, belching and central chest pain. Found on CTA to have type A aortic dissection and transferred to [facility] for surgical evaluation by Dr. Cabrera.     Ascending aortic dissection s/p modified bentall and hemiarch on 9/6   Hypovolemic shock   Cardiogenic shock  Post op respiratory insufficiency  Acute blood loss anemia  Lactic Acidosis  Thrombocytopenia  RIJ thrombus  Pancreatic cyst    Proteus PNA  Leukocytosis      Plan:   ***Neuro***  [x] Sedated with [x] Precedex - transitioned to Propofol  Post operative neuro assessment     ***Cardiovascular***  TTE on 9/28: EF 41%, No left ventricular thrombus. Moderate global left ventricular systolic dysfunction. Grossly right ventricular enlargement with decreased right ventricular systolic function.  9/27: Patient returned to ICU for presumed sepsis, presenting with tachypnea, fevers, tachycardia    Chronic A fib/ CVP 18 / MAP 90/ Hct 30.1 /  Lactate 2.2  [x] Dobutamine- 2.5 -> 1.25 mcgs   Continue Mexiletine for VT   Started Digoxin   Rate control with Amiodarone gtt  CT Chest on 9/12:  Status post recent ascending aortic dissection repair. Small amount of fluid surrounding the ascending aorta without evidence of enhancing wall to suggest abscess.  RUE duplex on 9/12: There is a thrombosed and occluded RIJ  Continuous reassessment of hemodynamics      ***Pulmonary***  CT Chest on 9/12: Bilateral lower lobe atelectasis with bilateral pleural effusions   CTA 9/28: RLL consolidation  Reintubated for change in mental status on 9/8, self extubated on 9/9 and reintubated again, extubated to HFO2 on 9/13 9/27 ReIntubated for airway protection (high peak/plateau pressures on volume control. Switched to Pressure control.) ARDS ?   Full vent support / Mar 10ppm   Hx of COPD / Continue bronchodilators  Monitor secretions and suction PRN     Mode: AC/ CMV (Assist Control/ Continuous Mandatory Ventilation)  RR (machine): 16  TV (machine): 450  FiO2: 40  PEEP: 7  ITime: 0.8  MAP: 10  PIP: 20              ***GI***  CT A/P on 9/12: Mild thickening of the cecum and ascending colon possibly representing mild nonspecific proximal colitis. Hepatic cirrhosis. The focal IPMN of the pancreas with large cyst within the tail the pancreas measuring 3.1 cm.  Colostomy bag in place, continue to monitor output   Passed FEES on 9/20, CC diet with reg liquids, pt with decreased appetite need to encourage PO intake    [x] Diet: TFs Glucerna 1.5 @ goal rate of 40 cc/hr  [x] Protonix   Simethicone PRN gas      ***Renal***  Continue to monitor I/Os, BUN/Creatinine.   Replete lytes PRN  Restarting Free water 300 q6  Amezcua present       ***ID***  SCx on 9/8 +Proteus mirabilis, on 9/27 +Staphylococcus aureus  UA on 9/9+ LE, WBC 60, few yeast  /  UCx on 9/10 NG   BCx on 9/28 + Gram Positive Cocci in Clusters   BCx on 10/1 NGTD  Nystatin for thrush   Broad spectrum antibiotics (Meropenem and Vancomycin for presumed sepsis) 9/27  Plan to stop Meropenem and increase Vancomycin      ***Endocrine***  [x]  DM : HbA1c 9.4%                - [x] Insulin gtt              - Need tight glycemic control to prevent wound infection.  Endo following for recs now that patient has some PO intake with tube feed supplementation         Patient requires continuous monitoring with bedside rhythm monitoring, pulse oximetry monitoring, and continuous central venous and arterial pressure monitoring; and intermittent blood gas analysis. Care plan discussed with the ICU care team.   Patient remained critical, at risk for life threatening decompensation.    I have spent 75 minutes providing critical care management to this patient.    By signing my name below, I, Maria D'Amico, attest that this documentation has been prepared under the direction and in the presence of Bj Abbott MD   Electronically signed: Maria D'Amico, Scribe, 10-02-22 @ 06:57    I, Bj Abbott, personally performed the services described in this documentation. all medical record entries made by the marcyibronaldo were at my direction and in my presence. I have reviewed the chart and agree that the record reflects my personal performance and is accurate and complete  Electronically signed: Bj Abbott MD  Patient seen and examined at the bedside.    Remained critically ill on continuous ICU monitoring.    OBJECTIVE:  Vital Signs Last 24 Hrs  T(C): 37.8 (02 Oct 2022 04:15), Max: 37.9 (01 Oct 2022 08:00)  T(F): 100 (02 Oct 2022 04:15), Max: 100.2 (01 Oct 2022 08:00)  HR: 88 (02 Oct 2022 06:45) (66 - 138)  BP: --  BP(mean): --  RR: 29 (02 Oct 2022 06:45) (16 - 35)  SpO2: 100% (02 Oct 2022 06:45) (97% - 100%)    Parameters below as of 02 Oct 2022 04:15  Patient On (Oxygen Delivery Method): ventilator    O2 Concentration (%): 40      Assessment:  71F PMH DM, COPD, Chronic Adrenal Insufficiency on Chronic prednisone, history of colorectal cancer s/p resection (colostomy bag), Hx of CAD, Chronic A fib on Eliquis, and tracheomalacia and multiple intubations, recent dx of OM presented to ED at [facility] this morning with epigastric pain, belching and central chest pain. Found on CTA to have type A aortic dissection and transferred to [facility] for surgical evaluation by Dr. Cabrera.     Ascending aortic dissection s/p modified bentall and hemiarch on 9/6   Hypovolemic shock   Cardiogenic shock  Post op respiratory insufficiency  Acute blood loss anemia  Lactic Acidosis  Thrombocytopenia  RIJ thrombus  Pancreatic cyst    Proteus PNA  Leukocytosis      Plan:   ***Neuro***  [x] Sedated with [x] Precedex - transitioned to Propofol  Post operative neuro assessment     ***Cardiovascular***  TTE on 9/28: EF 41%, No left ventricular thrombus. Moderate global left ventricular systolic dysfunction. Grossly right ventricular enlargement with decreased right ventricular systolic function.  9/27: Patient returned to ICU for presumed sepsis, presenting with tachypnea, fevers, tachycardia    Chronic A fib/ CVP 18 / MAP 90/ Hct 30.1 /  Lactate 2.2  [x] Dobutamine- 2.5 -> 1.25 mcgs   Continue Mexiletine for VT   Started Digoxin   Rate control with Amiodarone gtt  CT Chest on 9/12:  Status post recent ascending aortic dissection repair. Small amount of fluid surrounding the ascending aorta without evidence of enhancing wall to suggest abscess.  RUE duplex on 9/12: There is a thrombosed and occluded RIJ  Continuous reassessment of hemodynamics      ***Pulmonary***  CT Chest on 9/12: Bilateral lower lobe atelectasis with bilateral pleural effusions   CTA 9/28: RLL consolidation  Reintubated for change in mental status on 9/8, self extubated on 9/9 and reintubated again, extubated to HFO2 on 9/13 9/27 ReIntubated for airway protection (high peak/plateau pressures on volume control. Switched to Pressure control.) ARDS ?   Full vent support / Mar 10ppm - wean off today  Hx of COPD / Continue bronchodilators  Monitor secretions and suction PRN     Mode: AC/ CMV (Assist Control/ Continuous Mandatory Ventilation)  RR (machine): 16  TV (machine): 450  FiO2: 40  PEEP: 7  ITime: 0.8  MAP: 10  PIP: 20              ***GI***  CT A/P on 9/12: Mild thickening of the cecum and ascending colon possibly representing mild nonspecific proximal colitis. Hepatic cirrhosis. The focal IPMN of the pancreas with large cyst within the tail the pancreas measuring 3.1 cm.  Colostomy bag in place, continue to monitor output   Passed FEES on 9/20, CC diet with reg liquids, pt with decreased appetite need to encourage PO intake    [x] Diet: TFs Glucerna 1.5 @ goal rate of 40 cc/hr  [x] Protonix   Simethicone PRN gas      ***Renal***  Continue to monitor I/Os, BUN/Creatinine.   Replete lytes PRN  Restarting Free water 300 q6  Amezcua present       ***ID***  SCx on 9/8 +Proteus mirabilis, on 9/27 +Staphylococcus aureus  UA on 9/9+ LE, WBC 60, few yeast  /  UCx on 9/10 NG   BCx on 9/28 + Gram Positive Cocci in Clusters   BCx on 10/1 NGTD  Nystatin for thrush   Broad spectrum antibiotics (Meropenem and Vancomycin for presumed sepsis) 9/27  Plan to stop Meropenem and increase Vancomycin      ***Endocrine***  [x]  DM : HbA1c 9.4%                - [x] Insulin gtt              - Need tight glycemic control to prevent wound infection.  Endo following for recs now that patient has some PO intake with tube feed supplementation         Patient requires continuous monitoring with bedside rhythm monitoring, pulse oximetry monitoring, and continuous central venous and arterial pressure monitoring; and intermittent blood gas analysis. Care plan discussed with the ICU care team.   Patient remained critical, at risk for life threatening decompensation.    I have spent 75 minutes providing critical care management to this patient.    By signing my name below, I, Maria D'Amico, attest that this documentation has been prepared under the direction and in the presence of Bj Abbott MD   Electronically signed: Maria D'Amico, Scribe, 10-02-22 @ 06:57    I, Bj Abbott, personally performed the services described in this documentation. all medical record entries made by the marcyibronaldo were at my direction and in my presence. I have reviewed the chart and agree that the record reflects my personal performance and is accurate and complete  Electronically signed: Bj Abbott MD

## 2022-10-02 NOTE — PROGRESS NOTE ADULT - ASSESSMENT
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 (+) Tracheal cx from 9/27 for MRSA and repeat CT Chest performed on same day in the setting of clinical decompensation concerning for new multifocal areas of consolidation and mucoid impaction concerning for PNA.     -c/w Vancomycin with trough levels.   -TTE (-) vegetations on 10/1. Mediport removed 10/1.   -Would increase airway clearance therapy as tolerated  -Repeat blood cx pending.   -ARDSNet mechanical ventilation with sedation as needed to maintain vent synchrony   -c/w hydrocortisone 100mg q8hr   -DuoNebs q6hr +Budesonide BID. Next Tezpire due, deferred for now   -Appreciate excellent care as per CTI team  -Pulmonary will continue to follow this weekend. Dr. Allison to resume care 10/3

## 2022-10-03 DIAGNOSIS — J96.90 RESPIRATORY FAILURE, UNSPECIFIED, UNSPECIFIED WHETHER WITH HYPOXIA OR HYPERCAPNIA: ICD-10-CM

## 2022-10-03 LAB
ALBUMIN SERPL ELPH-MCNC: 3.1 G/DL — LOW (ref 3.3–5)
ALP SERPL-CCNC: 265 U/L — HIGH (ref 40–120)
ALT FLD-CCNC: 481 U/L — HIGH (ref 10–45)
ANION GAP SERPL CALC-SCNC: 10 MMOL/L — SIGNIFICANT CHANGE UP (ref 5–17)
AST SERPL-CCNC: 63 U/L — HIGH (ref 10–40)
BASE EXCESS BLDV CALC-SCNC: 1 MMOL/L — SIGNIFICANT CHANGE UP (ref -2–3)
BASE EXCESS BLDV CALC-SCNC: 1.7 MMOL/L — SIGNIFICANT CHANGE UP (ref -2–3)
BILIRUB SERPL-MCNC: 1.2 MG/DL — SIGNIFICANT CHANGE UP (ref 0.2–1.2)
BUN SERPL-MCNC: 24 MG/DL — HIGH (ref 7–23)
CA-I SERPL-SCNC: 1.19 MMOL/L — SIGNIFICANT CHANGE UP (ref 1.15–1.33)
CALCIUM SERPL-MCNC: 8.4 MG/DL — SIGNIFICANT CHANGE UP (ref 8.4–10.5)
CHLORIDE BLDV-SCNC: 118 MMOL/L — HIGH (ref 96–108)
CHLORIDE SERPL-SCNC: 117 MMOL/L — HIGH (ref 96–108)
CO2 BLDV-SCNC: 27 MMOL/L — HIGH (ref 22–26)
CO2 BLDV-SCNC: 28 MMOL/L — HIGH (ref 22–26)
CO2 SERPL-SCNC: 22 MMOL/L — SIGNIFICANT CHANGE UP (ref 22–31)
CREAT SERPL-MCNC: 0.42 MG/DL — LOW (ref 0.5–1.3)
EGFR: 103 ML/MIN/1.73M2 — SIGNIFICANT CHANGE UP
GAS PNL BLDA: SIGNIFICANT CHANGE UP
GAS PNL BLDV: 148 MMOL/L — HIGH (ref 136–145)
GAS PNL BLDV: SIGNIFICANT CHANGE UP
GAS PNL BLDV: SIGNIFICANT CHANGE UP
GLUCOSE BLDC GLUCOMTR-MCNC: 103 MG/DL — HIGH (ref 70–99)
GLUCOSE BLDC GLUCOMTR-MCNC: 108 MG/DL — HIGH (ref 70–99)
GLUCOSE BLDC GLUCOMTR-MCNC: 114 MG/DL — HIGH (ref 70–99)
GLUCOSE BLDC GLUCOMTR-MCNC: 118 MG/DL — HIGH (ref 70–99)
GLUCOSE BLDC GLUCOMTR-MCNC: 119 MG/DL — HIGH (ref 70–99)
GLUCOSE BLDC GLUCOMTR-MCNC: 122 MG/DL — HIGH (ref 70–99)
GLUCOSE BLDC GLUCOMTR-MCNC: 140 MG/DL — HIGH (ref 70–99)
GLUCOSE BLDC GLUCOMTR-MCNC: 148 MG/DL — HIGH (ref 70–99)
GLUCOSE BLDC GLUCOMTR-MCNC: 149 MG/DL — HIGH (ref 70–99)
GLUCOSE BLDC GLUCOMTR-MCNC: 150 MG/DL — HIGH (ref 70–99)
GLUCOSE BLDC GLUCOMTR-MCNC: 150 MG/DL — HIGH (ref 70–99)
GLUCOSE BLDC GLUCOMTR-MCNC: 153 MG/DL — HIGH (ref 70–99)
GLUCOSE BLDC GLUCOMTR-MCNC: 170 MG/DL — HIGH (ref 70–99)
GLUCOSE BLDC GLUCOMTR-MCNC: 193 MG/DL — HIGH (ref 70–99)
GLUCOSE BLDC GLUCOMTR-MCNC: 193 MG/DL — HIGH (ref 70–99)
GLUCOSE BLDC GLUCOMTR-MCNC: 199 MG/DL — HIGH (ref 70–99)
GLUCOSE BLDC GLUCOMTR-MCNC: 202 MG/DL — HIGH (ref 70–99)
GLUCOSE BLDC GLUCOMTR-MCNC: 210 MG/DL — HIGH (ref 70–99)
GLUCOSE BLDC GLUCOMTR-MCNC: 210 MG/DL — HIGH (ref 70–99)
GLUCOSE BLDC GLUCOMTR-MCNC: 215 MG/DL — HIGH (ref 70–99)
GLUCOSE BLDC GLUCOMTR-MCNC: 224 MG/DL — HIGH (ref 70–99)
GLUCOSE BLDC GLUCOMTR-MCNC: 75 MG/DL — SIGNIFICANT CHANGE UP (ref 70–99)
GLUCOSE BLDC GLUCOMTR-MCNC: 78 MG/DL — SIGNIFICANT CHANGE UP (ref 70–99)
GLUCOSE BLDC GLUCOMTR-MCNC: 98 MG/DL — SIGNIFICANT CHANGE UP (ref 70–99)
GLUCOSE BLDV-MCNC: 224 MG/DL — HIGH (ref 70–99)
GLUCOSE SERPL-MCNC: 223 MG/DL — HIGH (ref 70–99)
HCO3 BLDV-SCNC: 26 MMOL/L — SIGNIFICANT CHANGE UP (ref 22–29)
HCO3 BLDV-SCNC: 27 MMOL/L — SIGNIFICANT CHANGE UP (ref 22–29)
HCT VFR BLD CALC: 30 % — LOW (ref 34.5–45)
HCT VFR BLDA CALC: 28 % — LOW (ref 34.5–46.5)
HGB BLD CALC-MCNC: 9.2 G/DL — LOW (ref 11.7–16.1)
HGB BLD-MCNC: 9 G/DL — LOW (ref 11.5–15.5)
HOROWITZ INDEX BLDV+IHG-RTO: 40 — SIGNIFICANT CHANGE UP
HOROWITZ INDEX BLDV+IHG-RTO: 40 — SIGNIFICANT CHANGE UP
LACTATE BLDV-MCNC: 1.2 MMOL/L — SIGNIFICANT CHANGE UP (ref 0.5–2)
MAGNESIUM SERPL-MCNC: 2.3 MG/DL — SIGNIFICANT CHANGE UP (ref 1.6–2.6)
MCHC RBC-ENTMCNC: 24.7 PG — LOW (ref 27–34)
MCHC RBC-ENTMCNC: 30 GM/DL — LOW (ref 32–36)
MCV RBC AUTO: 82.2 FL — SIGNIFICANT CHANGE UP (ref 80–100)
NRBC # BLD: 13 /100 WBCS — HIGH (ref 0–0)
PCO2 BLDV: 42 MMHG — SIGNIFICANT CHANGE UP (ref 39–42)
PCO2 BLDV: 42 MMHG — SIGNIFICANT CHANGE UP (ref 39–42)
PH BLDV: 7.4 — SIGNIFICANT CHANGE UP (ref 7.32–7.43)
PH BLDV: 7.41 — SIGNIFICANT CHANGE UP (ref 7.32–7.43)
PHOSPHATE SERPL-MCNC: 2.3 MG/DL — LOW (ref 2.5–4.5)
PLATELET # BLD AUTO: 102 K/UL — LOW (ref 150–400)
PO2 BLDV: 47 MMHG — HIGH (ref 25–45)
PO2 BLDV: 51 MMHG — HIGH (ref 25–45)
POTASSIUM BLDV-SCNC: 4 MMOL/L — SIGNIFICANT CHANGE UP (ref 3.5–5.1)
POTASSIUM SERPL-MCNC: 4.1 MMOL/L — SIGNIFICANT CHANGE UP (ref 3.5–5.3)
POTASSIUM SERPL-SCNC: 4.1 MMOL/L — SIGNIFICANT CHANGE UP (ref 3.5–5.3)
PREALB SERPL-MCNC: 10 MG/DL — LOW (ref 20–40)
PROT SERPL-MCNC: 5.9 G/DL — LOW (ref 6–8.3)
RBC # BLD: 3.65 M/UL — LOW (ref 3.8–5.2)
RBC # FLD: 26.2 % — HIGH (ref 10.3–14.5)
SAO2 % BLDV: 79.9 % — SIGNIFICANT CHANGE UP (ref 67–88)
SAO2 % BLDV: 80.9 % — SIGNIFICANT CHANGE UP (ref 67–88)
SODIUM SERPL-SCNC: 149 MMOL/L — HIGH (ref 135–145)
TSH SERPL-MCNC: 0.32 UIU/ML — SIGNIFICANT CHANGE UP (ref 0.27–4.2)
VANCOMYCIN TROUGH SERPL-MCNC: 12 UG/ML — SIGNIFICANT CHANGE UP (ref 10–20)
VANCOMYCIN TROUGH SERPL-MCNC: 12.3 UG/ML — SIGNIFICANT CHANGE UP (ref 10–20)
WBC # BLD: 16.34 K/UL — HIGH (ref 3.8–10.5)
WBC # FLD AUTO: 16.34 K/UL — HIGH (ref 3.8–10.5)

## 2022-10-03 PROCEDURE — 99292 CRITICAL CARE ADDL 30 MIN: CPT | Mod: 24

## 2022-10-03 PROCEDURE — 99291 CRITICAL CARE FIRST HOUR: CPT

## 2022-10-03 PROCEDURE — 99222 1ST HOSP IP/OBS MODERATE 55: CPT

## 2022-10-03 PROCEDURE — 99232 SBSQ HOSP IP/OBS MODERATE 35: CPT

## 2022-10-03 PROCEDURE — 71045 X-RAY EXAM CHEST 1 VIEW: CPT | Mod: 26

## 2022-10-03 PROCEDURE — 99233 SBSQ HOSP IP/OBS HIGH 50: CPT

## 2022-10-03 RX ORDER — MAGNESIUM SULFATE 500 MG/ML
2 VIAL (ML) INJECTION ONCE
Refills: 0 | Status: COMPLETED | OUTPATIENT
Start: 2022-10-03 | End: 2022-10-03

## 2022-10-03 RX ORDER — POTASSIUM CHLORIDE 20 MEQ
20 PACKET (EA) ORAL
Refills: 0 | Status: COMPLETED | OUTPATIENT
Start: 2022-10-03 | End: 2022-10-03

## 2022-10-03 RX ORDER — MILRINONE LACTATE 1 MG/ML
0.1 INJECTION, SOLUTION INTRAVENOUS
Qty: 20 | Refills: 0 | Status: DISCONTINUED | OUTPATIENT
Start: 2022-10-03 | End: 2022-10-04

## 2022-10-03 RX ORDER — HYDROCORTISONE 20 MG
50 TABLET ORAL EVERY 8 HOURS
Refills: 0 | Status: DISCONTINUED | OUTPATIENT
Start: 2022-10-03 | End: 2022-10-04

## 2022-10-03 RX ORDER — POTASSIUM CHLORIDE 20 MEQ
40 PACKET (EA) ORAL ONCE
Refills: 0 | Status: COMPLETED | OUTPATIENT
Start: 2022-10-03 | End: 2022-10-03

## 2022-10-03 RX ADMIN — MILRINONE LACTATE 4.02 MICROGRAM(S)/KG/MIN: 1 INJECTION, SOLUTION INTRAVENOUS at 10:34

## 2022-10-03 RX ADMIN — Medication 500000 UNIT(S): at 00:35

## 2022-10-03 RX ADMIN — Medication 40 MILLIEQUIVALENT(S): at 19:43

## 2022-10-03 RX ADMIN — MILRINONE LACTATE 4.02 MICROGRAM(S)/KG/MIN: 1 INJECTION, SOLUTION INTRAVENOUS at 06:10

## 2022-10-03 RX ADMIN — Medication 0.5 MILLIGRAM(S): at 17:35

## 2022-10-03 RX ADMIN — Medication 3 MILLILITER(S): at 05:59

## 2022-10-03 RX ADMIN — PANTOPRAZOLE SODIUM 40 MILLIGRAM(S): 20 TABLET, DELAYED RELEASE ORAL at 11:55

## 2022-10-03 RX ADMIN — PROPOFOL 5 MICROGRAM(S)/KG/MIN: 10 INJECTION, EMULSION INTRAVENOUS at 15:33

## 2022-10-03 RX ADMIN — Medication 3 MILLILITER(S): at 00:54

## 2022-10-03 RX ADMIN — MEXILETINE HYDROCHLORIDE 200 MILLIGRAM(S): 150 CAPSULE ORAL at 13:49

## 2022-10-03 RX ADMIN — Medication 50 MILLIGRAM(S): at 13:49

## 2022-10-03 RX ADMIN — ENOXAPARIN SODIUM 40 MILLIGRAM(S): 100 INJECTION SUBCUTANEOUS at 11:56

## 2022-10-03 RX ADMIN — Medication 250 MILLIGRAM(S): at 05:52

## 2022-10-03 RX ADMIN — INSULIN HUMAN 3 UNIT(S)/HR: 100 INJECTION, SOLUTION SUBCUTANEOUS at 10:35

## 2022-10-03 RX ADMIN — Medication 500000 UNIT(S): at 11:57

## 2022-10-03 RX ADMIN — Medication 1 TABLET(S): at 11:57

## 2022-10-03 RX ADMIN — PROPOFOL 5 MICROGRAM(S)/KG/MIN: 10 INJECTION, EMULSION INTRAVENOUS at 10:35

## 2022-10-03 RX ADMIN — Medication 500000 UNIT(S): at 17:17

## 2022-10-03 RX ADMIN — CHLORHEXIDINE GLUCONATE 15 MILLILITER(S): 213 SOLUTION TOPICAL at 05:19

## 2022-10-03 RX ADMIN — PROPOFOL 5 MICROGRAM(S)/KG/MIN: 10 INJECTION, EMULSION INTRAVENOUS at 19:34

## 2022-10-03 RX ADMIN — Medication 125 MICROGRAM(S): at 11:57

## 2022-10-03 RX ADMIN — Medication 20 MILLIEQUIVALENT(S): at 15:33

## 2022-10-03 RX ADMIN — MILRINONE LACTATE 4.02 MICROGRAM(S)/KG/MIN: 1 INJECTION, SOLUTION INTRAVENOUS at 19:35

## 2022-10-03 RX ADMIN — Medication 50 MILLIGRAM(S): at 21:12

## 2022-10-03 RX ADMIN — Medication 3 MILLILITER(S): at 17:35

## 2022-10-03 RX ADMIN — MEXILETINE HYDROCHLORIDE 200 MILLIGRAM(S): 150 CAPSULE ORAL at 05:20

## 2022-10-03 RX ADMIN — Medication 250 MILLIGRAM(S): at 17:16

## 2022-10-03 RX ADMIN — CHLORHEXIDINE GLUCONATE 15 MILLILITER(S): 213 SOLUTION TOPICAL at 17:17

## 2022-10-03 RX ADMIN — Medication 1 APPLICATION(S): at 12:56

## 2022-10-03 RX ADMIN — Medication 25 GRAM(S): at 06:49

## 2022-10-03 RX ADMIN — Medication 20 MILLIEQUIVALENT(S): at 13:50

## 2022-10-03 RX ADMIN — MEXILETINE HYDROCHLORIDE 200 MILLIGRAM(S): 150 CAPSULE ORAL at 21:11

## 2022-10-03 RX ADMIN — INSULIN HUMAN 3 UNIT(S)/HR: 100 INJECTION, SOLUTION SUBCUTANEOUS at 19:34

## 2022-10-03 RX ADMIN — DEXMEDETOMIDINE HYDROCHLORIDE IN 0.9% SODIUM CHLORIDE 8.38 MICROGRAM(S)/KG/HR: 4 INJECTION INTRAVENOUS at 10:34

## 2022-10-03 RX ADMIN — Medication 50 MILLIGRAM(S): at 05:22

## 2022-10-03 RX ADMIN — DEXMEDETOMIDINE HYDROCHLORIDE IN 0.9% SODIUM CHLORIDE 8.38 MICROGRAM(S)/KG/HR: 4 INJECTION INTRAVENOUS at 15:33

## 2022-10-03 RX ADMIN — Medication 3 MILLILITER(S): at 11:37

## 2022-10-03 RX ADMIN — Medication 85 MILLIMOLE(S): at 06:42

## 2022-10-03 RX ADMIN — DEXMEDETOMIDINE HYDROCHLORIDE IN 0.9% SODIUM CHLORIDE 8.38 MICROGRAM(S)/KG/HR: 4 INJECTION INTRAVENOUS at 19:35

## 2022-10-03 RX ADMIN — Medication 500000 UNIT(S): at 05:19

## 2022-10-03 RX ADMIN — Medication 0.5 MILLIGRAM(S): at 05:59

## 2022-10-03 NOTE — CONSULT NOTE ADULT - NS ATTEND AMEND GEN_ALL_CORE FT
Pt seen and examined with team at time of service, I was physically present for the key portions for evaluation and management (E/M) service provided, and preformed key portions of the procedure. Agree with above. Plan discussed with primary team.  resp failure unable to wean for trach - optimize for surgery  d/w family

## 2022-10-03 NOTE — ADVANCED PRACTICE NURSE CONSULT - ASSESSMENT
Consult received & events noted to date. Pt was encountered in CTU- Ms. Figueroa is intubated, opens eyes to verbal stimuli. Introduced self & role of CWOCN. Pt requires total assistance with turning & repositioning & staff RNs at bedside to assist. Pt is on an alternating air with low air loss support surface. Amezcua catheter in place. Pt with colostomy. Colostomy-pink &viable; w/o ostomy function at this time (no flatus nor stool noted). Pouch seal intact (ostomy care being managed by staff). Supplies at bedside. Complete Cair air fluidized boots in place. Upon assessment noted to coccyx/gluteal cleft a skin alteration measuring  5 cm L x 4cm W x 0.1 cm D with different skin changes noted, there is an area of intact, hyperpigment skin (darker than pt's skin tone) suggesting that there may be a component of deep tissue injury, along with epidermal slippage & partial thickness tissue loss with pink moist wound base in center measuring 2.5cm L X 1.5 cm W X 0.1 dm D. Scant serosanguineous drainage noted. No malodor, erythema, fluctuance noted. Findings suggest that this skin alteration appears to be an evolving deep tissue injury. Will recommend  to continue to monitor & apply  Triad paste twice daily to lay down a protective coating to skin & to manage low levels of exudate while promoting healing.   Ms. Gamble's many comorbid conditions, critical clinical status, challenges with oxygenation, hemodynamic instability (on pressors), &  extreme immobility, inactivity,  as well as poor nutritional status all contribute to her high risk for pressure injury development and hinder healing which may make this pressure injury unavoidable. Assisted pt to side-lying position with RNs & use of off-loading positioner in place.  
Pt is on a [employer] Sport low air loss & pressure redistribution surface & is being turned & positioned as per nursing documentation. Pt is sedated & on a ventilator. She has a ramirez catheter to contain urine & stool is contained w/ pouching system. Pt is NPO at present. The following was noted:  Sacral area has old scarring- dark tissue from previous Pressure Injury.

## 2022-10-03 NOTE — PROGRESS NOTE ADULT - SUBJECTIVE AND OBJECTIVE BOX
CHIEF COMPLAINT: f/up sob, chronic resp failure, TBM, severe persistent asthma, VC dysfunction, Type A aortic dissection s/p repair w/modified "Bentall procedure and hemiarch replacement -vented and sedated off nitrous-40%-more responsive-weaning sedation/      Interval Events: all lines changed for MRSA sepsis    REVIEW OF SYSTEMS:  Constitutional: No fevers or chills. No weight loss. No fatigue or generalized malaise.  Eyes: No itching or discharge from the eyes  ENT: No ear pain. No ear discharge. No nasal congestion. No post nasal drip. No epistaxis. No throat pain. No sore throat. No difficulty swallowing.   CV: No chest pain. No palpitations. No lightheadedness or dizziness.   Resp: No dyspnea at rest. No dyspnea on exertion. No orthopnea. No wheezing. No cough. No stridor. No sputum production. No chest pain with respiration.  GI: No nausea. No vomiting. No diarrhea.  MSK: No joint pain or pain in any extremities  Integumentary: No skin lesions. No pedal edema.  Neurological: No gross motor weakness. No sensory changes.  [ ] All other systems negative  [+ ] Unable to assess ROS because _MS poor_______    OBJECTIVE:  ICU Vital Signs Last 24 Hrs  T(C): 36.9 (03 Oct 2022 04:00), Max: 37.2 (02 Oct 2022 08:00)  T(F): 98.4 (03 Oct 2022 04:00), Max: 99 (02 Oct 2022 08:00)  HR: 72 (03 Oct 2022 05:15) (59 - 119)  BP: 164/76 (02 Oct 2022 19:45) (164/76 - 164/76)  BP(mean): 109 (02 Oct 2022 19:45) (109 - 109)  ABP: 147/52 (03 Oct 2022 05:15) (94/51 - 165/61)  ABP(mean): 82 (03 Oct 2022 05:15) (64 - 97)  RR: 26 (03 Oct 2022 05:15) (18 - 38)  SpO2: 100% (03 Oct 2022 05:15) (100% - 100%)    O2 Parameters below as of 03 Oct 2022 04:00  Patient On (Oxygen Delivery Method): ventilator    O2 Concentration (%): 40      Mode: AC/ CMV (Assist Control/ Continuous Mandatory Ventilation), RR (machine): 16, TV (machine): 450, FiO2: 40, PEEP: 5, ITime: 0.9, MAP: 6, PIP: 14    10-01 @ :01  -  10-02 @ 07:00  --------------------------------------------------------  IN: 2673.8 mL / OUT: 2580 mL / NET: 93.8 mL    10-02 @ :01  -  10-03 @ 05:27  --------------------------------------------------------  IN: 3101.4 mL / OUT: 2935 mL / NET: 166.4 mL      CAPILLARY BLOOD GLUCOSE  119 (03 Oct 2022 05:00)      POCT Blood Glucose.: 119 mg/dL (03 Oct 2022 05:14)      PHYSICAL EXAM: NAD in bed on vent  General: Arrousable.   HEENT: Atraumatic, normocephalic.                 Mallampatti Grade 3                 No nasal congestion.                No tonsillar or pharyngeal exudates.  Lymph Nodes: No palpable lymphadenopathy  Neck: No JVD. No carotid bruit.   Respiratory: Normal chest expansion                         Normal percussion                         Normal and equal air entry                         No wheeze, +rhonchi but no rales.  Cardiovascular: S1 S2 normal. No murmurs, rubs or gallops.   Abdomen: Soft, non-tender, non-distended. No organomegaly. Normoactive bowel sounds.  Extremities: Warm to touch. Peripheral pulse palpable. No pedal edema.   Skin: No rashes or skin lesions  Neurological: Motor and sensory examination equal and normal in all four extremities.  Psychiatry: Appropriate mood and affect.-on sedation  HOSPITAL MEDICATIONS:  MEDICATIONS  (STANDING):  albuterol/ipratropium for Nebulization 3 milliLiter(s) Nebulizer every 6 hours  buDESOnide    Inhalation Suspension 0.5 milliGRAM(s) Inhalation every 12 hours  chlorhexidine 0.12% Liquid 15 milliLiter(s) Oral Mucosa every 12 hours  chlorhexidine 2% Cloths 1 Application(s) Topical <User Schedule>  collagenase Ointment 1 Application(s) Topical daily  dexMEDEtomidine Infusion 0.5 MICROgram(s)/kG/Hr (8.38 mL/Hr) IV Continuous <Continuous>  digoxin  Injectable 125 MICROGram(s) IV Push daily  DOBUTamine Infusion 1.5 MICROgram(s)/kG/Min (3.05 mL/Hr) IV Continuous <Continuous>  enoxaparin Injectable 40 milliGRAM(s) SubCutaneous every 24 hours  hydrocortisone sodium succinate Injectable 50 milliGRAM(s) IV Push every 8 hours  insulin regular Infusion 3 Unit(s)/Hr (3 mL/Hr) IV Continuous <Continuous>  mexiletine 200 milliGRAM(s) Oral every 8 hours  multivitamin 1 Tablet(s) Oral daily  niCARdipine Infusion 5 mG/Hr (25 mL/Hr) IV Continuous <Continuous>  nystatin    Suspension 594903 Unit(s) Oral every 6 hours  pantoprazole  Injectable 40 milliGRAM(s) IV Push daily  propofol Infusion 12.438 MICROgram(s)/kG/Min (5 mL/Hr) IV Continuous <Continuous>  sodium chloride 0.9%. 1000 milliLiter(s) (50 mL/Hr) IV Continuous <Continuous>  vancomycin  IVPB 1000 milliGRAM(s) IV Intermittent every 12 hours  vancomycin  IVPB      vasopressin Infusion 0.04 Unit(s)/Min (6 mL/Hr) IV Continuous <Continuous>    MEDICATIONS  (PRN):  simethicone drops 80 milliGRAM(s) Oral every 12 hours PRN Gas      LABS:                        9.0    16.34 )-----------( 102      ( 03 Oct 2022 00:35 )             30.0     10-03    149<H>  |  117<H>  |  24<H>  ----------------------------<  223<H>  4.1   |  22  |  0.42<L>    Ca    8.4      03 Oct 2022 00:35  Phos  2.3     10-03  Mg     2.3     10-03    TPro  5.9<L>  /  Alb  3.1<L>  /  TBili  1.2  /  DBili  x   /  AST  63<H>  /  ALT  481<H>  /  AlkPhos  265<H>  10-03        Arterial Blood Gas:  10-03 @ 00:17  7.43/37/168/25/98.6/0.4  ABG lactate: --  Arterial Blood Gas:  10-02 @ 16:55  7.41/36/134/23/99.0/-1.6  ABG lactate: --  Arterial Blood Gas:  10-02 @ 15:12  7.41/35/147/22/98.5/-2.1  ABG lactate: --  Arterial Blood Gas:  10-02 @ 11:29  7.43/36/163/24/98.8/-0.2  ABG lactate: --  Arterial Blood Gas:  10-02 @ 09:16  7.43/37/155/25/99.1/0.4  ABG lactate: --  Arterial Blood Gas:  10-02 @ 05:30  7.42/35/189/23/99.0/-1.5  ABG lactate: --  Arterial Blood Gas:  10-02 @ 00:06  7.47/37/155/27/99.2/3.1  ABG lactate: --    Venous Blood Gas:  10-03 @ 00:20  7.40/42/51/26/80.9  VBG Lactate: 1.2  Venous Blood Gas:  10-02 @ 16:55  7.38/40/46/24/76.6  VBG Lactate: 1.4  Venous Blood Gas:  10-02 @ 11:29  7.40/38/45/24/76.3  VBG Lactate: 1.4  Venous Blood Gas:  10-02 @ 05:30  7.38/42/46/25/73.0  VBG Lactate: 2.1  Venous Blood Gas:  10-02 @ 00:06  7.44/41/54/28/82.9  VBG Lactate: 1.2      MICROBIOLOGY:     RADIOLOGY:  [ ] Reviewed and interpreted by me    Point of Care Ultrasound Findings:    PFT:    EKG:

## 2022-10-03 NOTE — CONSULT NOTE ADULT - SUBJECTIVE AND OBJECTIVE BOX
CC: respiratory failure     HPI: 71F PMH DM, COPD, Chronic Adrenal Insufficiency on Chronic prednisone, history of colorectal cancer s/p resection (colostomy bag), Hx of CAD, Chronic A fib on Eliquis, and tracheomalacia and multiple intubations, recent dx of OM presented to ED at [facility] with epigastric pain, belching and central chest pain. Found on CTA to have type A aortic dissection and transferred to [facility] for surgical evaluation by Dr. Cabrera. Now presenting with respiratory failure requiring tracheostomy.       PAST MEDICAL & SURGICAL HISTORY:  Atrial fibrillation  paroxysmal, on eliquis      Diabetes  Type 2      COPD (chronic obstructive pulmonary disease)      Adrenal insufficiency  Medrol daily for over 50 years      Aortic insufficiency  moderate AR on echo 5/3/2018      Pelvic fracture      Asthma      Tracheobronchomalacia  diagnosed 2015, s/p bronchial thermoplasty 2016 (Dr Zapien); recent bronchoscopy 6/5/2018 revealed no evidence of tracheobronchomalacia in trachea or bronchial tubes      Colorectal cancer  4/2018- last treatment , chemo and radiation      Rectal bleeding      Seizure  x 1 1/7/18      DVT (deep venous thrombosis)  15-20 years ago, took coumadin      TIA (transient ischemic attack)  multiple, last 5 years ago - presents as right-sided weakness      History of partial hysterectomy  30 years ago - fibroids      H/O total knee replacement, bilateral  5 years ago      History of sinus surgery  multiple sinus surgeries      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      H/O pelvic surgery  5 years ago - s/p fracture      History of tracheomalacia  2015 - attempted tracheal stenting ([facility])- course complicated by obstruction, respiratory failure, multiple CPR attempts -  stent discontinued; 10/20/2016 Tracheobronchoplasty (Prolene Mesh) performed at [facility] by Dr Zapien      S/P bronchoscopy  6/5/2018 - [place] (Dr Zapien) no evidence of tracheobronchomalacia in trachea or bronchial tubes      Rectal bleeding  exam under anesthesia ([facility]) 2/2018        Allergies    aspirin (Short breath)  Avelox (Short breath; Pruritus)  cefepime (Anaphylaxis)  codeine (Short breath)  Dilaudid (Short breath)  iodine (Short breath; Swelling)  penicillin (Anaphylaxis)  shellfish (Anaphylaxis)  tetanus toxoid (Short breath)  Valium (Short breath)    Intolerances      MEDICATIONS  (STANDING):  albuterol/ipratropium for Nebulization 3 milliLiter(s) Nebulizer every 6 hours  buDESOnide    Inhalation Suspension 0.5 milliGRAM(s) Inhalation every 12 hours  chlorhexidine 0.12% Liquid 15 milliLiter(s) Oral Mucosa every 12 hours  chlorhexidine 2% Cloths 1 Application(s) Topical <User Schedule>  collagenase Ointment 1 Application(s) Topical daily  dexMEDEtomidine Infusion 0.5 MICROgram(s)/kG/Hr (8.38 mL/Hr) IV Continuous <Continuous>  digoxin  Injectable 125 MICROGram(s) IV Push daily  enoxaparin Injectable 40 milliGRAM(s) SubCutaneous every 24 hours  hydrocortisone sodium succinate Injectable 50 milliGRAM(s) IV Push every 8 hours  insulin regular Infusion 3 Unit(s)/Hr (3 mL/Hr) IV Continuous <Continuous>  mexiletine 200 milliGRAM(s) Oral every 8 hours  milrinone Infusion 0.2 MICROgram(s)/kG/Min (4.02 mL/Hr) IV Continuous <Continuous>  multivitamin 1 Tablet(s) Oral daily  nystatin    Suspension 740822 Unit(s) Oral every 6 hours  pantoprazole  Injectable 40 milliGRAM(s) IV Push daily  propofol Infusion 12.438 MICROgram(s)/kG/Min (5 mL/Hr) IV Continuous <Continuous>  sodium chloride 0.9%. 1000 milliLiter(s) (50 mL/Hr) IV Continuous <Continuous>  vancomycin  IVPB 1000 milliGRAM(s) IV Intermittent every 12 hours  vancomycin  IVPB      vasopressin Infusion 0.04 Unit(s)/Min (6 mL/Hr) IV Continuous <Continuous>    MEDICATIONS  (PRN):  simethicone drops 80 milliGRAM(s) Oral every 12 hours PRN Gas      FAMILY HISTORY:  Family history of asthma  Family history of diabetes mellitus type II    Sibling  Still living? Unknown  Family history of breast cancer, Age at diagnosis: Age Unknown.     Social History:  · Substance use	Unable to obtain  · Unable to Obtain due to	Severe illness/injury  · Social History (marital status, living situation, occupation, and sexual history)	Lives at [facility] -- was recently on abx for OM     Tobacco Screening:  · Core Measure Site	No  · Has the patient used tobacco in the past 30 days?	No      ROS:   unable to obtain due to pts clinical condition     Vital Signs Last 24 Hrs  T(C): 36.6 (03 Oct 2022 16:00), Max: 36.9 (03 Oct 2022 04:00)  T(F): 97.9 (03 Oct 2022 16:00), Max: 98.4 (03 Oct 2022 04:00)  HR: 63 (03 Oct 2022 17:00) (57 - 77)  BP: 164/76 (02 Oct 2022 19:45) (164/76 - 164/76)  BP(mean): 109 (02 Oct 2022 19:45) (109 - 109)  RR: 18 (03 Oct 2022 17:00) (18 - 38)  SpO2: 100% (03 Oct 2022 17:00) (100% - 100%)    Parameters below as of 03 Oct 2022 11:49  Patient On (Oxygen Delivery Method): vent                              9.0    16.34 )-----------( 102      ( 03 Oct 2022 00:35 )             30.0    10-03    149<H>  |  117<H>  |  24<H>  ----------------------------<  223<H>  4.1   |  22  |  0.42<L>    Ca    8.4      03 Oct 2022 00:35  Phos  2.3     10-03  Mg     2.3     10-03    TPro  5.9<L>  /  Alb  3.1<L>  /  TBili  1.2  /  DBili  x   /  AST  63<H>  /  ALT  481<H>  /  AlkPhos  265<H>  10-03       PHYSICAL EXAM:  Gen: sedated   Skin: No rashes, bruises, or lesions  Head: Normocephalic, Atraumatic  Face: no edema, erythema, or fluctuance. Parotid glands soft without mass  Eyes: no scleral injection  Nose: Nares bilaterally patent, no discharge  Mouth: ETT in place. No Stridor / Drooling / Trismus.  Mucosa moist, tongue/uvula midline, oropharynx clear  Neck: Flat, supple, no lymphadenopathy, trachea midline, no masses  Lymphatic: No lymphadenopathy  Resp: on vent   CV: no peripheral edema/cyanosis  GI: nondistended   Peripheral vascular: no JVD or edema  Neuro: unable to evaluate due to pts clinical condition

## 2022-10-03 NOTE — PROGRESS NOTE ADULT - SUBJECTIVE AND OBJECTIVE BOX
Patient is a 72y old  Female who presents with a chief complaint of Type A aortic dissection (03 Oct 2022 18:14)       INTERVAL HPI/OVERNIGHT EVENTS:  Patient seen and evaluated at bedside.  Pt is resting comfortable in NAD.     Allergies    aspirin (Short breath)  Avelox (Short breath; Pruritus)  cefepime (Anaphylaxis)  codeine (Short breath)  Dilaudid (Short breath)  iodine (Short breath; Swelling)  penicillin (Anaphylaxis)  shellfish (Anaphylaxis)  tetanus toxoid (Short breath)  Valium (Short breath)    Intolerances        Vital Signs Last 24 Hrs  T(C): 36.6 (03 Oct 2022 16:00), Max: 36.9 (03 Oct 2022 04:00)  T(F): 97.9 (03 Oct 2022 16:00), Max: 98.4 (03 Oct 2022 04:00)  HR: 67 (03 Oct 2022 18:00) (57 - 77)  BP: 164/76 (02 Oct 2022 19:45) (164/76 - 164/76)  BP(mean): 109 (02 Oct 2022 19:45) (109 - 109)  RR: 17 (03 Oct 2022 18:00) (17 - 38)  SpO2: 100% (03 Oct 2022 18:00) (100% - 100%)    Parameters below as of 03 Oct 2022 17:44  Patient On (Oxygen Delivery Method): ventilator        LABS:                        9.0    16.34 )-----------( 102      ( 03 Oct 2022 00:35 )             30.0     10-03    149<H>  |  117<H>  |  24<H>  ----------------------------<  223<H>  4.1   |  22  |  0.42<L>    Ca    8.4      03 Oct 2022 00:35  Phos  2.3     10-03  Mg     2.3     10-03    TPro  5.9<L>  /  Alb  3.1<L>  /  TBili  1.2  /  DBili  x   /  AST  63<H>  /  ALT  481<H>  /  AlkPhos  265<H>  10-03        CAPILLARY BLOOD GLUCOSE  104 (03 Oct 2022 07:00)  122 (03 Oct 2022 06:00)  119 (03 Oct 2022 05:00)  148 (03 Oct 2022 04:00)  153 (03 Oct 2022 03:00)  150 (03 Oct 2022 02:00)  193 (03 Oct 2022 01:00)  210 (03 Oct 2022 00:00)  118 (02 Oct 2022 23:00)  91 (02 Oct 2022 22:00)  104 (02 Oct 2022 21:00)  107 (02 Oct 2022 20:00)      POCT Blood Glucose.: 224 mg/dL (03 Oct 2022 18:23)  POCT Blood Glucose.: 215 mg/dL (03 Oct 2022 17:07)  POCT Blood Glucose.: 149 mg/dL (03 Oct 2022 16:06)  POCT Blood Glucose.: 108 mg/dL (03 Oct 2022 15:17)  POCT Blood Glucose.: 98 mg/dL (03 Oct 2022 14:28)  POCT Blood Glucose.: 118 mg/dL (03 Oct 2022 13:33)  POCT Blood Glucose.: 170 mg/dL (03 Oct 2022 12:32)  POCT Blood Glucose.: 150 mg/dL (03 Oct 2022 11:15)  POCT Blood Glucose.: 153 mg/dL (03 Oct 2022 10:28)  POCT Blood Glucose.: 153 mg/dL (03 Oct 2022 08:56)  POCT Blood Glucose.: 140 mg/dL (03 Oct 2022 08:12)  POCT Blood Glucose.: 103 mg/dL (03 Oct 2022 06:45)  POCT Blood Glucose.: 122 mg/dL (03 Oct 2022 05:56)  POCT Blood Glucose.: 119 mg/dL (03 Oct 2022 05:14)  POCT Blood Glucose.: 148 mg/dL (03 Oct 2022 04:23)  POCT Blood Glucose.: 153 mg/dL (03 Oct 2022 03:13)  POCT Blood Glucose.: 150 mg/dL (03 Oct 2022 01:55)  POCT Blood Glucose.: 193 mg/dL (03 Oct 2022 00:52)  POCT Blood Glucose.: 210 mg/dL (03 Oct 2022 00:16)  POCT Blood Glucose.: 118 mg/dL (02 Oct 2022 23:07)  POCT Blood Glucose.: 91 mg/dL (02 Oct 2022 22:11)  POCT Blood Glucose.: 104 mg/dL (02 Oct 2022 21:11)  POCT Blood Glucose.: 107 mg/dL (02 Oct 2022 19:51)      Lower Extremity Physical Exam:  Vascular: DP/PT 2/4 B/L, CFT <3 seconds B/L, Temperature gradient warm to cool B/L  Neuro: Epicritic sensation intact to the level of midfoot B/L  Musculoskeletal/Ortho: unremarkable   Skin: right foot with two navicular wounds with fibrous wound bed, no clinical signs of infection. L foot with no clinical signs of infection.

## 2022-10-03 NOTE — PROGRESS NOTE ADULT - SUBJECTIVE AND OBJECTIVE BOX
INFECTIOUS DISEASES FOLLOW UP-- Fouzia Calixto  [phone]    This is a follow up note for this  72yFemale with  Dissection of thoracic aorta        ROS:  CONSTITUTIONAL:  No fever, good appetite  CARDIOVASCULAR:  No chest pain or palpitations  RESPIRATORY:  No dyspnea  GASTROINTESTINAL:  No nausea, vomiting, diarrhea, or abdominal pain  GENITOURINARY:  No dysuria  NEUROLOGIC:  No headache,     Allergies    aspirin (Short breath)  Avelox (Short breath; Pruritus)  cefepime (Anaphylaxis)  codeine (Short breath)  Dilaudid (Short breath)  iodine (Short breath; Swelling)  penicillin (Anaphylaxis)  shellfish (Anaphylaxis)  tetanus toxoid (Short breath)  Valium (Short breath)    Intolerances        ANTIBIOTICS/RELEVANT:  antimicrobials  nystatin    Suspension 318920 Unit(s) Oral every 6 hours  vancomycin  IVPB 1000 milliGRAM(s) IV Intermittent every 12 hours  vancomycin  IVPB        immunologic:    OTHER:  albuterol/ipratropium for Nebulization 3 milliLiter(s) Nebulizer every 6 hours  buDESOnide    Inhalation Suspension 0.5 milliGRAM(s) Inhalation every 12 hours  chlorhexidine 0.12% Liquid 15 milliLiter(s) Oral Mucosa every 12 hours  chlorhexidine 2% Cloths 1 Application(s) Topical <User Schedule>  collagenase Ointment 1 Application(s) Topical daily  dexMEDEtomidine Infusion 0.5 MICROgram(s)/kG/Hr IV Continuous <Continuous>  digoxin  Injectable 125 MICROGram(s) IV Push daily  enoxaparin Injectable 40 milliGRAM(s) SubCutaneous every 24 hours  hydrocortisone sodium succinate Injectable 50 milliGRAM(s) IV Push every 8 hours  insulin regular Infusion 3 Unit(s)/Hr IV Continuous <Continuous>  mexiletine 200 milliGRAM(s) Oral every 8 hours  milrinone Infusion 0.2 MICROgram(s)/kG/Min IV Continuous <Continuous>  multivitamin 1 Tablet(s) Oral daily  pantoprazole  Injectable 40 milliGRAM(s) IV Push daily  propofol Infusion 12.438 MICROgram(s)/kG/Min IV Continuous <Continuous>  simethicone drops 80 milliGRAM(s) Oral every 12 hours PRN  sodium chloride 0.9%. 1000 milliLiter(s) IV Continuous <Continuous>  vasopressin Infusion 0.04 Unit(s)/Min IV Continuous <Continuous>      Objective:  Vital Signs Last 24 Hrs  T(C): 36.6 (03 Oct 2022 16:00), Max: 36.9 (03 Oct 2022 04:00)  T(F): 97.9 (03 Oct 2022 16:00), Max: 98.4 (03 Oct 2022 04:00)  HR: 67 (03 Oct 2022 18:00) (57 - 77)  BP: 164/76 (02 Oct 2022 19:45) (164/76 - 164/76)  BP(mean): 109 (02 Oct 2022 19:45) (109 - 109)  RR: 17 (03 Oct 2022 18:00) (17 - 38)  SpO2: 100% (03 Oct 2022 18:00) (100% - 100%)    Parameters below as of 03 Oct 2022 17:44  Patient On (Oxygen Delivery Method): ventilator        PHYSICAL EXAM:  Constitutional:no acute distress  Eyes:EBER, EOMI  Ear/Nose/Throat: no oral lesions, 	  Respiratory: clear BL  Cardiovascular: S1S2  Gastrointestinal:soft, (+) BS, no tenderness  Extremities:no e/e/c  No Lymphadenopathy  IV sites not inflammed.    LABS:                        9.0    16.34 )-----------( 102      ( 03 Oct 2022 00:35 )             30.0     10-03    149<H>  |  117<H>  |  24<H>  ----------------------------<  223<H>  4.1   |  22  |  0.42<L>    Ca    8.4      03 Oct 2022 00:35  Phos  2.3     10-03  Mg     2.3     10-03    TPro  5.9<L>  /  Alb  3.1<L>  /  TBili  1.2  /  DBili  x   /  AST  63<H>  /  ALT  481<H>  /  AlkPhos  265<H>  10-03          MICROBIOLOGY:            RECENT CULTURES:  10-01 @ 18:55  .Surgical Swab Port Device  --  --  --    No growth  --  10-01 @ 01:47  .Blood Blood-Peripheral  --  --  --    No growth to date.  --  10-01 @ 01:24  .Blood Blood-Peripheral  --  --  --    No growth to date.  --  09-30 @ 11:45  .Blood Blood  --  --  --    No growth to date.  --  09-29 @ 18:33  .Bronchial Bronchial Lavage  --  --  --    Normal Respiratory Jessica present  --  09-28 @ 01:48  .Blood Blood-Peripheral  Blood Culture PCR  Methicillin resistant Staphylococcus aureus  Blood Culture PCR  PCR    Growth in anaerobic bottle: Methicillin Resistant Staphylococcus aureus  ***Blood Panel PCR results on this specimen are available  approximately 3 hours after the Gram stain result.***  Gram stain, PCR, and/or culture results may not always  correspond due to difference in methodologies.  ************************************************************  This PCR assay was performed by multiplex PCR. This  Assay tests for 66 bacterial and resistance gene targets.  Please refer to the [facility] Labs test directory  at [email or web address] for details.  --  09-27 @ 17:54  Trach Asp Tracheal Aspirate  Methicillin resistant Staphylococcus aureus  Methicillin resistant Staphylococcus aureus  GARRETT    Moderate Methicillin Resistant Staphylococcus aureus  Normal Respiratory Jessica present  --  09-27 @ 14:59  Catheterized Catheterized  --  --  --    10,000 - 49,000 CFU/mL Candida glabrata "Susceptibilities not performed"  --  09-27 @ 11:21  .Blood Blood-Peripheral  --  --  --    No Growth Final  --  09-27 @ 06:40  .Blood Blood-Peripheral  --  --  --    No Growth Final  --      RADIOLOGY & ADDITIONAL STUDIES:    < from: Xray Chest 1 View- PORTABLE-Routine (Xray Chest 1 View- PORTABLE-Routine in AM.) (10.03.22 @ 03:38) >  IMPRESSION:    Small bilateral pleural effusions.  Lines and tubes in expected positions.    < end of copied text >   INFECTIOUS DISEASES FOLLOW UP-- Fouzia Calixto  [phone]    This is a follow up note for this  72yFemale with  Dissection of thoracic aorta  MRSA bacteremia  s/p mediport removal  bacteremia cleared since  last + 9/28 cleared since 10/1        ROS:  CONSTITUTIONAl: but awake and alert    Allergies    aspirin (Short breath)  Avelox (Short breath; Pruritus)  cefepime (Anaphylaxis)  codeine (Short breath)  Dilaudid (Short breath)  iodine (Short breath; Swelling)  penicillin (Anaphylaxis)  shellfish (Anaphylaxis)  tetanus toxoid (Short breath)  Valium (Short breath)    Intolerances        ANTIBIOTICS/RELEVANT:  antimicrobials  nystatin    Suspension 307412 Unit(s) Oral every 6 hours  vancomycin  IVPB 1000 milliGRAM(s) IV Intermittent every 12 hours  vancomycin  IVPB        immunologic:    OTHER:  albuterol/ipratropium for Nebulization 3 milliLiter(s) Nebulizer every 6 hours  buDESOnide    Inhalation Suspension 0.5 milliGRAM(s) Inhalation every 12 hours  chlorhexidine 0.12% Liquid 15 milliLiter(s) Oral Mucosa every 12 hours  chlorhexidine 2% Cloths 1 Application(s) Topical <User Schedule>  collagenase Ointment 1 Application(s) Topical daily  dexMEDEtomidine Infusion 0.5 MICROgram(s)/kG/Hr IV Continuous <Continuous>  digoxin  Injectable 125 MICROGram(s) IV Push daily  enoxaparin Injectable 40 milliGRAM(s) SubCutaneous every 24 hours  hydrocortisone sodium succinate Injectable 50 milliGRAM(s) IV Push every 8 hours  insulin regular Infusion 3 Unit(s)/Hr IV Continuous <Continuous>  mexiletine 200 milliGRAM(s) Oral every 8 hours  milrinone Infusion 0.2 MICROgram(s)/kG/Min IV Continuous <Continuous>  multivitamin 1 Tablet(s) Oral daily  pantoprazole  Injectable 40 milliGRAM(s) IV Push daily  propofol Infusion 12.438 MICROgram(s)/kG/Min IV Continuous <Continuous>  simethicone drops 80 milliGRAM(s) Oral every 12 hours PRN  sodium chloride 0.9%. 1000 milliLiter(s) IV Continuous <Continuous>  vasopressin Infusion 0.04 Unit(s)/Min IV Continuous <Continuous>      Objective:  Vital Signs Last 24 Hrs  T(C): 36.6 (03 Oct 2022 16:00), Max: 36.9 (03 Oct 2022 04:00)  T(F): 97.9 (03 Oct 2022 16:00), Max: 98.4 (03 Oct 2022 04:00)  HR: 67 (03 Oct 2022 18:00) (57 - 77)  BP: 164/76 (02 Oct 2022 19:45) (164/76 - 164/76)  BP(mean): 109 (02 Oct 2022 19:45) (109 - 109)  RR: 17 (03 Oct 2022 18:00) (17 - 38)  SpO2: 100% (03 Oct 2022 18:00) (100% - 100%)    Parameters below as of 03 Oct 2022 17:44  Patient On (Oxygen Delivery Method): ventilator        PHYSICAL EXAM:  Constitutional:no acute distress  Eyes:EBER, EOMI  Ear/Nose/Throat: no oral lesions, left CVC	  Respiratory: clear BL sternotomy dressing CDI  Cardiovascular: S1S2  Gastrointestinal:soft, (+) BS, no tenderness  ostomy loose brown stool  buttock small stage 2 ulcer  Extremities:no e/e/c  No Lymphadenopathy  IV sites not inflammed.    LABS:                        9.0    16.34 )-----------( 102      ( 03 Oct 2022 00:35 )             30.0     10-03    149<H>  |  117<H>  |  24<H>  ----------------------------<  223<H>  4.1   |  22  |  0.42<L>    Ca    8.4      03 Oct 2022 00:35  Phos  2.3     10-03  Mg     2.3     10-03    TPro  5.9<L>  /  Alb  3.1<L>  /  TBili  1.2  /  DBili  x   /  AST  63<H>  /  ALT  481<H>  /  AlkPhos  265<H>  10-03          MICROBIOLOGY:            RECENT CULTURES:  10-01 @ 18:55  .Surgical Swab Port Device  --  --  --    No growth  --  10-01 @ 01:47  .Blood Blood-Peripheral  --  --  --    No growth to date.  --  10-01 @ 01:24  .Blood Blood-Peripheral  --  --  --    No growth to date.  --  09-30 @ 11:45  .Blood Blood  --  --  --    No growth to date.  --  09-29 @ 18:33  .Bronchial Bronchial Lavage  --  --  --    Normal Respiratory Jessica present  --  09-28 @ 01:48  .Blood Blood-Peripheral  Blood Culture PCR  Methicillin resistant Staphylococcus aureus  Blood Culture PCR  PCR    Growth in anaerobic bottle: Methicillin Resistant Staphylococcus aureus  ***Blood Panel PCR results on this specimen are available  approximately 3 hours after the Gram stain result.***  Gram stain, PCR, and/or culture results may not always  correspond due to difference in methodologies.  ************************************************************  This PCR assay was performed by multiplex PCR. This  Assay tests for 66 bacterial and resistance gene targets.  Please refer to the [facility] Labs test directory  at [email or web address] for details.  --  09-27 @ 17:54  Trach Asp Tracheal Aspirate  Methicillin resistant Staphylococcus aureus  Methicillin resistant Staphylococcus aureus  GARRETT    Moderate Methicillin Resistant Staphylococcus aureus  Normal Respiratory Jessica present  --  09-27 @ 14:59  Catheterized Catheterized  --  --  --    10,000 - 49,000 CFU/mL Candida glabrata "Susceptibilities not performed"  --  09-27 @ 11:21  .Blood Blood-Peripheral  --  --  --    No Growth Final  --  09-27 @ 06:40  .Blood Blood-Peripheral  --  --  --    No Growth Final  --      RADIOLOGY & ADDITIONAL STUDIES:    < from: Xray Chest 1 View- PORTABLE-Routine (Xray Chest 1 View- PORTABLE-Routine in AM.) (10.03.22 @ 03:38) >  IMPRESSION:    Small bilateral pleural effusions.  Lines and tubes in expected positions.    < end of copied text >

## 2022-10-03 NOTE — CONSULT NOTE ADULT - ASSESSMENT
71F found on CTA to have type A aortic dissection and transferred to [facility] for surgical evaluation. Now presenting with respiratory failure requiring tracheostomy s/p multiple intubations. FiO2 40 PEEP 5, no pressors/AC, no PPM, no fevers, BMI 23.1

## 2022-10-03 NOTE — PROGRESS NOTE ADULT - SUBJECTIVE AND OBJECTIVE BOX
Patient seen and examined at the bedside.    Remained critically ill on continuous ICU monitoring.    OBJECTIVE:  Vital Signs Last 24 Hrs  T(C): 36.6 (03 Oct 2022 20:00), Max: 36.9 (03 Oct 2022 04:00)  T(F): 97.9 (03 Oct 2022 20:00), Max: 98.4 (03 Oct 2022 04:00)  HR: 61 (03 Oct 2022 20:00) (57 - 76)  BP: --  BP(mean): --  RR: 18 (03 Oct 2022 20:00) (17 - 38)  SpO2: 99% (03 Oct 2022 20:00) (99% - 100%)    Parameters below as of 03 Oct 2022 20:00  Patient On (Oxygen Delivery Method): ventilator    O2 Concentration (%): 40    Physical Exam:   General: intubated  Neurology: somnolent, but able to follow simple commands  ENT/Neck: Neck supple, trachea midline, No JVD  Respiratory: rhonchi throughout    CV: S1S2, no murmurs        [x] Sinus Rhythm   Abdominal: Soft, NT, ND, colostomy in place  Extremities: +4 RUE edema, 3+LUE edema, 2 + pedal edema noted, + peripheral pulses   Skin: Dry dressing over right heel, no Rashes, Hematoma, Ecchymosis       Assessment:  71F PMH DM, COPD, Chronic Adrenal Insufficiency on Chronic prednisone, history of colorectal cancer s/p resection (colostomy bag), Hx of CAD, Chronic A fib on Eliquis, and tracheomalacia and multiple intubations, recent dx of OM presented to ED at [facility] this morning with epigastric pain, belching and central chest pain. Found on CTA to have type A aortic dissection and transferred to [facility] for surgical evaluation by Dr. Cabrera.     Ascending aortic dissection s/p modified bentall and hemiarch on 9/6   Hypovolemic shock   Post op respiratory insufficiency  Acute blood loss anemia  Stress hyperglycemia   RIJ thrombus  Pancreatic cyst    Proteus PNA  Leukocytosis    Plan:   ***Neuro***  [x] Sedated with [x] Precedex [x] Diprivan   Post operative neuro assessment     ***Cardiovascular***  TTE on 9/28: EF 41%, No left ventricular thrombus. Moderate global left ventricular systolic dysfunction. Grossly right ventricular enlargement with decreased right ventricular systolic function.  9/27: Patient returned to ICU for presumed sepsis, presenting with tachypnea, fevers, tachycardia    CT Chest on 9/12:  Status post recent ascending aortic dissection repair. Small amount of fluid surrounding the ascending aorta without evidence of enhancing wall to suggest abscess.  RUE duplex on 9/12: There is a thrombosed and occluded RIJ    Continuous reassessment of hemodynamics  SR/ CVP 4 / MAP 72/ Hct 27.0%/ Lactate 1.8  [x] Vasopressin - 0.04 units/min [x] Primacor- 0.2mcgs  Continue Mexiletine for VT   Digoxin for rate control   [x] VTE prophylaxis with Lovenox     ***Pulmonary***  CT Chest on 9/12: Bilateral lower lobe atelectasis with bilateral pleural effusions   CTA 9/28: RLL consolidation  Reintubated for change in mental status on 9/8, self extubated on 9/9 and reintubated again, extubated to HFO2 on 9/13 9/27 ReIntubated for airway protection (high peak/plateau pressures on volume control. Switched to Pressure control.) ARDS ?   Full vent support   Hx of COPD / Continue bronchodilators  Monitor secretions and suction PRN     Mode: CPAP with PS  FiO2: 40  PEEP: 5  PS: 10  MAP: 8  PIP: 16              ***GI***  CT A/P on 9/12: Mild thickening of the cecum and ascending colon possibly representing mild nonspecific proximal colitis. Hepatic cirrhosis. The focal IPMN of the pancreas with large cyst within the tail the pancreas measuring 3.1 cm.  Colostomy bag in place, continue to monitor output   Passed FEES on 9/20, CC diet with reg liquids, pt with decreased appetite need to encourage PO intake    [x] Diet: TFs Glucerna 1.5 @ goal rate of 60 cc/hr  [x] Protonix   Simethicone PRN gas    ***Renal***  Continue to monitor I/Os, BUN/Creatinine.   Replete lytes PRN  Free water 300 q6 for hypernatremia   Amezcua present     ***ID***  SCx on 9/8 +Proteus mirabilis, on 9/27 +Staphylococcus aureus  UA on 9/9+ LE, WBC 60, few yeast  /  UCx on 9/10 NG   BCx on 9/28 + Gram Positive Cocci in Clusters   BCx on 10/1 NGTD  Nystatin for thrush   Vancomycin IVPB for MRSA    ***Endocrine***  [x]  DM : HbA1c 9.4%                - [x] Insulin gtt              - Need tight glycemic control to prevent wound infection.    Endo following for recs now that patient has some PO intake with tube feed supplementation     Patient requires continuous monitoring with bedside rhythm monitoring, pulse oximetry monitoring, and continuous central venous and arterial pressure monitoring; and intermittent blood gas analysis. Care plan discussed with the ICU care team.   Patient remained critical, at risk for life threatening decompensation.    I have spent 40 minutes providing critical care management to this patient.    By signing my name below, I, Bharti Barrios, attest that this documentation has been prepared under the direction and in the presence of Saleem Rico NP.  Electronically signed: Georgi Gottlieb, 10-03-22 @ 20:13    I, Saleem Rico, personally performed the services described in this documentation. all medical record entries made by the scribe were at my direction and in my presence. I have reviewed the chart and agree that the record reflects my personal performance and is accurate and complete  Electronically signed: Saleem Rico NP.

## 2022-10-03 NOTE — PROGRESS NOTE ADULT - ASSESSMENT
71F s/p right foot navicular bone biopsy, wound debridement and heel I&D with medial navicular wound   - pt seen and evaluated  - Right foot with two navicular wounds with fibrous wound bed, no clinical signs of infection. L foot with no clinical signs of infection.   - No clinical signs of infection to b/l feet   - Continue Z flows at all times   - Continue santyl for R foot wound followed by DSD  - will monitor periodically during this admission, reconsult sooner as needed

## 2022-10-03 NOTE — PROGRESS NOTE ADULT - TIME BILLING
as above: no signif changes--remains in resp failure-sepsis physiology-MRSA-weaning inotropes/vented/ABX-improving LFTs  multifactorial dyspnea-resp failure-severe persistent asthma, TBM s/p tracheoplasty, s/p Aortic aneurysm repair, Bronchitis (proteus)-O2-keep 90%; wean as able off sedation  severe persistent asthma--medrol 8mg changed to hydrocortisone 100 q 8, singulair 10, duoneb q 6, budes .5 bid, tezspire 8/29-next 9/29  TBM-s/p tracheoplasty--accapella, unable to use vest due to surgery  s/p Aortic aneurysm repair--as per [facility] staff care  AF-on heparin--eventual eliquis rx               CV-improved/inotropes/cardene-MAP above 60  DVT R-IJ-on heparin rx  ID-s/p proteus bronchitis-s/p meropenum changed to ceftriaxone and back to meropenem/vanco- off of ABX as of 9/19--restart of ABX 9/27-meropenem/vanco-NOW solely vanco for MRSA sepsis  PT-OOB as able (on hold)                              GI-TF as able--f/up LFTs       VC dysfunction--aspiration precautions-sp and sw evaln--NGT in place/TF; FEESST passed 9/20  [facility]                                    Heme onc f/up colon ca  prog--critical in CTU                PT-when/if improved    Eulalio Allison MD-Pulmonary   [phone]

## 2022-10-03 NOTE — ADVANCED PRACTICE NURSE CONSULT - REASON FOR CONSULT
Requested by staff RN to evaluate skin status. PMH is noted:71F PMH DM, COPD, Chronic Adrenal Insufficiency on Chronic prednisone, history of colorectal cancer s/p resection (colostomy bag), Hx of CAD, Chronic A fib on Eliquis, and tracheomalacia and multiple intubations, recent dx of OM presented to ED at [facility] this morning with epigastric pain, belching and central chest pain. Found on CTA to have type A aortic dissection and transferred to [facility] for surgical evaluation by Dr. Cabrera. Pt s/p repair of aortic dissection Type A.
Request for wound care to reevaluate skin status of coccyx. Pt was last seen by [facility] colleague on 9/19.  PMH is noted as obtained per chart review:  Pt is 71F PMH DM, COPD, Chronic Adrenal Insufficiency on Chronic prednisone, history of colorectal cancer s/p resection (colostomy bag), Hx of CAD, Chronic A fib on Eliquis, and tracheomalacia and multiple intubations, recent dx of OM presented to ED at [facility] this morning with epigastric pain, belching and central chest pain. Found on CTA to have type A aortic dissection and transferred to [facility] for surgical evaluation by Dr. Cabrera. (06 Sep 2022 16:32). 9/27: Patient returned to ICU for presumed sepsis, presenting with tachypnea, fevers, tachycardia & intubated. IV vasopressors (phenylephrine, levophed, vasopressin) started to maintain MAP > 65. Per chart note pt remains critically ill on continuous ICU monitoring.

## 2022-10-03 NOTE — PROGRESS NOTE ADULT - ASSESSMENT
71 year-old female with a history of DM2, CAD, A-Fib on apixaban, Severe Persistent Asthma (on chronic steroids, recently started on Tezspire), colon cancer s/p resection/chemo, and tracheobronchomalacia s/p tracheoplasty, and recent OM of the R foot s/b debridement and completed course of Vancomycin/Ertapenem for MRSA/ESBL Proteus/Corynebacterium who now presents with chest pain, found to have Type A dissection s/p repair with modified Bentall procedure and hemiarch replacement on 9/6/22. Post-op course complicated by acute hypoxemic respiratory failure, shock, anemia, and hyperglycemia requiring insulin gtt. Extubated 9/13, now awake and alert with mild encephalopathy but overall significantly improved.    Assessment:  Acute hypoxemic respiratory failure requiring intubation  Type A Aortic Dissection  Severe Persistent Asthma  History of Tracheobronchomalacia  fevers and MSSA bacteremia and tracheal aspirate material with MSSA    Fevers and MSSA bacteremia:  Send repeat blood cultures to look for organism clearance  Patient with multiple medication allergy history including PCN and cepholosporins  Continue IV vancomycin- trough 12. on 10/3 is therapeutic maintain current dosing   TTE performed 10/1 no evidence of vegetations on the valves EF improving  Agree with removal of mediport  Can continue the Meropenem for now but will plan to discontinue if no other organisms are identified on culture  Continue to follow CBC  Continue to follow creatinine  Continue to follow Vanco trough levels      Discussed with CTU team    Jeff Calixto MD  Can be called via Teams  After 5pm/weekends [phone]       71 year-old female with a history of DM2, CAD, A-Fib on apixaban, Severe Persistent Asthma (on chronic steroids, recently started on Tezspire), colon cancer s/p resection/chemo, and tracheobronchomalacia s/p tracheoplasty, and recent OM of the R foot s/b debridement and completed course of Vancomycin/Ertapenem for MRSA/ESBL Proteus/Corynebacterium who now presents with chest pain, found to have Type A dissection s/p repair with modified Bentall procedure and hemiarch replacement on 9/6/22. Post-op course complicated by acute hypoxemic respiratory failure, shock, anemia, and hyperglycemia requiring insulin gtt. Extubated 9/13, now awake and alert with mild encephalopathy but overall significantly improved.    Assessment:  Acute hypoxemic respiratory failure requiring intubation  Type A Aortic Dissection  Severe Persistent Asthma  History of Tracheobronchomalacia  fevers and MSSA bacteremia and tracheal aspirate material with MSSA    Fevers and MRSA bacteremia:  Source- pneumonia vs more likely medi-port (which was removed)  Continue IV vancomycin- trough 12. on 10/3 is therapeutic maintain current dosing   TTE performed 10/1 no evidence of vegetations on the valves EF improving  Will plan to treat for 4-6 weeks in the setting of post surgical repair of thoracic aorta dissection  Continue to follow CBC  Continue to follow creatinine  Continue to follow Vanco trough levels      Discussed with CTU team    Jeff Calixto MD  Can be called via Teams  After 5pm/weekends [phone]       71 year-old female with a history of DM2, CAD, A-Fib on apixaban, Severe Persistent Asthma (on chronic steroids, recently started on Tezspire), colon cancer s/p resection/chemo, and tracheobronchomalacia s/p tracheoplasty, and recent OM of the R foot s/b debridement and completed course of Vancomycin/Ertapenem for MRSA/ESBL Proteus/Corynebacterium who now presents with chest pain, found to have Type A dissection s/p repair with modified Bentall procedure and hemiarch replacement on 9/6/22. Post-op course complicated by acute hypoxemic respiratory failure, shock, anemia, and hyperglycemia requiring insulin gtt. Extubated 9/13, now awake and alert with mild encephalopathy but overall significantly improved.    Assessment:  Acute hypoxemic respiratory failure requiring intubation  Type A Aortic Dissection  Severe Persistent Asthma  History of Tracheobronchomalacia  fevers and MSSA bacteremia and tracheal aspirate material with MSSA    Fevers and MRSA bacteremia:  Source- pneumonia vs more likely medi-port (which was removed)  Continue IV vancomycin- trough 12. on 10/3 is therapeutic maintain current dosing   TTE performed 10/1 no evidence of vegetations on the valves EF improving  Will plan to treat for 4-6 weeks in the setting of post surgical repair of thoracic aorta dissection  Continue to follow CBC  Continue to follow creatinine  Continue to follow Vanco trough levels    Would check syphilis screen  Hepatitis B,C      Discussed with CTU team    Jeff Calixto MD  Can be called via Teams  After 5pm/weekends [phone]

## 2022-10-03 NOTE — CONSULT NOTE ADULT - PROBLEM SELECTOR RECOMMENDATION 9
- Will look into scheduling trach for next week - Will look into scheduling trach for next week  - optimize for surgery   - Risks, benefits and alternatives of tracheostomy discussed with family including but not limited to bleeding possibly massive to injury to great vessels, infection, injury to lungs, esophagus, difficulty swallowing, inability to speak, scarring in the trachea that can obstruct the trachea, airway compromise, mucous plugging/ need for very close tracheal care as well as surgery in a critically ill patient.

## 2022-10-03 NOTE — PROGRESS NOTE ADULT - ASSESSMENT
71 year-old female with a history of DM2, CAD, A-Fib on apixaban, Severe Persistent Asthma (on chronic steroids, recently started on Tezspire), colon cancer s/p resection/chemo, and tracheobronchomalacia s/p tracheoplasty, and recent OM of the R foot s/b debridement and completed course of Vancomycin/Ertapenem for MRSA/ESBL Proteus/Corynebacterium who now presents with chest pain, found to have Type A dissection s/p repair with modified Bentall procedure and hemiarch replacement on 22. Post-op course complicated by acute hypoxemic respiratory failure, shock, anemia, and hyperglycemia requiring insulin gtt. Extubated , now awake and alert with mild encephalopathy but overall significantly improved.    Assessment:  Acute hypoxemic respiratory failure requiring intubation  Type A Aortic Dissection  Severe Persistent Asthma  History of Tracheobronchomalacia    Plan:  See below:  -**************************                                SEE Below:  -improving but weakness  9/15-ABX adjusted to ceftriaxone (LFTS)-PICU  -PICU, low grade temp-fever work up in progress, no resp decline or sx  -resp stable at present but tenuous  -for FEESST today, NGT in place w/TF  -s/p FEESST-passed, remains in PICU          -TF in place at 40cc, more OOB          -hypoglycemia noted and rx, no change in resp status  -vented/sedated-pressors/inotropes/ABX  -remains vented on nitrous/less O2 needs, improving LFTS                   -vented/semi sedated--less O2 needs  10/3-on vanco, weaning sedation/, all lines changed

## 2022-10-03 NOTE — PROGRESS NOTE ADULT - SUBJECTIVE AND OBJECTIVE BOX
Patient returned call.  Please call back   Patient seen and examined at the bedside.    Remained critically ill on continuous ICU monitoring.    OBJECTIVE:  Vital Signs Last 24 Hrs  T(C): 36.9 (03 Oct 2022 04:00), Max: 37.2 (02 Oct 2022 08:00)  T(F): 98.4 (03 Oct 2022 04:00), Max: 99 (02 Oct 2022 08:00)  HR: 69 (03 Oct 2022 07:00) (59 - 77)  BP: 164/76 (02 Oct 2022 19:45) (164/76 - 164/76)  BP(mean): 109 (02 Oct 2022 19:45) (109 - 109)  RR: 26 (03 Oct 2022 07:00) (18 - 38)  SpO2: 100% (03 Oct 2022 07:00) (100% - 100%)    Parameters below as of 03 Oct 2022 04:00  Patient On (Oxygen Delivery Method): ventilator    O2 Concentration (%): 40                 Assessment:  71F PMH DM, COPD, Chronic Adrenal Insufficiency on Chronic prednisone, history of colorectal cancer s/p resection (colostomy bag), Hx of CAD, Chronic A fib on Eliquis, and tracheomalacia and multiple intubations, recent dx of OM presented to ED at [facility] this morning with epigastric pain, belching and central chest pain. Found on CTA to have type A aortic dissection and transferred to [facility] for surgical evaluation by Dr. Cabrera.     Ascending aortic dissection s/p modified bentall and hemiarch on 9/6   Hypovolemic shock   Cardiogenic shock  Post op respiratory insufficiency  Acute blood loss anemia  Thrombocytopenia  RIJ thrombus  Pancreatic cyst    Proteus PNA  Leukocytosis        Plan:   ***Neuro***  [x] Sedated with [x] Diprivan [x] Precedex    Post operative neuro assessment     ***Cardiovascular***  TTE on 9/28: EF 41%, No left ventricular thrombus. Moderate global left ventricular systolic dysfunction. Grossly right ventricular enlargement with decreased right ventricular systolic function.  9/27: Patient returned to ICU for presumed sepsis, presenting with tachypnea, fevers, tachycardia    10/1 TTE: EF 69% normal RV, no Pericardial Effusion   Invasive hemodynamic monitoring, assess perfusion indices   SR / MAP 75/ Hct 30.0/ Lactate 1.2  [x] Dobutamine- 1.5 mcgs [x] Primacor 0.2 mcs [x] Cardene 5 mG/hr  Vasopressin currently off  Continue Mexiletine for VT   Rate control with Digoxin   [x] VTE prophylaxis with Lovenox   CT Chest on 9/12:  Status post recent ascending aortic dissection repair. Small amount of fluid surrounding the ascending aorta without evidence of enhancing wall to suggest abscess.  RUE duplex on 9/12: There is a thrombosed and occluded RIJ  10/1 IR Mediport removal   Continuous reassessment of hemodynamics  Serial EKG and cardiac enzymes     ***Pulmonary***  CT Chest on 9/12: Bilateral lower lobe atelectasis with bilateral pleural effusions   CTA 9/28: RLL consolidation  Reintubated for change in mental status on 9/8, self extubated on 9/9 and reintubated again, extubated to HFO2 on 9/13 9/27 ReIntubated for airway protection (high peak/plateau pressures on volume control. Switched to Pressure control.) ARDS ?   Full vent support   Hx of COPD / Continue bronchodilators  Monitor secretions and suction PRN       Mode: AC/ CMV (Assist Control/ Continuous Mandatory Ventilation)  RR (machine): 16  TV (machine): 450  FiO2: 40  PEEP: 5  ITime: 0.9  MAP: 6  PIP: 14              ***GI***  CT A/P on 9/12: Mild thickening of the cecum and ascending colon possibly representing mild nonspecific proximal colitis. Hepatic cirrhosis. The focal IPMN of the pancreas with large cyst within the tail the pancreas measuring 3.1 cm.  Colostomy bag in place, continue to monitor output   Passed FEES on 9/20, CC diet with reg liquids, pt with decreased appetite need to encourage PO intake    [x] Diet: TFs Glucerna 1.5 @ goal rate of 60cc/hr  [x] Protonix   Simethicone PRN gas      ***Renal***  Continue to monitor I/Os, BUN/Creatinine.   Replete lytes PRN  Free water 300 q6  Amezcua present       ***ID***  SCx on 9/8 +Proteus mirabilis, on 9/27 +Staphylococcus aureus  UA on 9/9+ LE, WBC 60, few yeast  /  UCx on 9/10 NG   BCx on 9/28 + Gram Positive Cocci in Clusters   BCx on 10/1 NGTD  Nystatin for thrush   Broad spectrum antibiotics (Meropenem and Vancomycin for presumed sepsis) 9/27  Meropenem D/c'ed yesterday        ***Endocrine***  [x]  DM : HbA1c 9.4%                - [x] Insulin gtt              - Need tight glycemic control to prevent wound infection.  Endo following for recs now that patient has some PO intake with tube feed supplementation         Patient requires continuous monitoring with bedside rhythm monitoring, pulse oximetry monitoring, and continuous central venous and arterial pressure monitoring; and intermittent blood gas analysis. Care plan discussed with the ICU care team.   Patient remained critical, at risk for life threatening decompensation.    I have spent 30 minutes providing critical care management to this patient.    By signing my name below, I, Kieran Plascencia, attest that this documentation has been prepared under the direction and in the presence of Bj Abbott MD   Electronically signed: Georgi Cordero, 10-03-22 @ 07:13    I, Bj Abbott, personally performed the services described in this documentation. all medical record entries made by the scribe were at my direction and in my presence. I have reviewed the chart and agree that the record reflects my personal performance and is accurate and complete  Electronically signed: Bj Abbott MD  Patient seen and examined at the bedside.    Remained critically ill on continuous ICU monitoring.    OBJECTIVE:  Vital Signs Last 24 Hrs  T(C): 36.9 (03 Oct 2022 04:00), Max: 37.2 (02 Oct 2022 08:00)  T(F): 98.4 (03 Oct 2022 04:00), Max: 99 (02 Oct 2022 08:00)  HR: 69 (03 Oct 2022 07:00) (59 - 77)  BP: 164/76 (02 Oct 2022 19:45) (164/76 - 164/76)  BP(mean): 109 (02 Oct 2022 19:45) (109 - 109)  RR: 26 (03 Oct 2022 07:00) (18 - 38)  SpO2: 100% (03 Oct 2022 07:00) (100% - 100%)    Parameters below as of 03 Oct 2022 04:00  Patient On (Oxygen Delivery Method): ventilator    O2 Concentration (%): 40                 Assessment:  71F PMH DM, COPD, Chronic Adrenal Insufficiency on Chronic prednisone, history of colorectal cancer s/p resection (colostomy bag), Hx of CAD, Chronic A fib on Eliquis, and tracheomalacia and multiple intubations, recent dx of OM presented to ED at [facility] this morning with epigastric pain, belching and central chest pain. Found on CTA to have type A aortic dissection and transferred to [facility] for surgical evaluation by Dr. Cabrera.     Ascending aortic dissection s/p modified bentall and hemiarch on 9/6   Hypovolemic shock   Cardiogenic shock  Post op respiratory insufficiency  Acute blood loss anemia  Thrombocytopenia  RIJ thrombus  Pancreatic cyst    Proteus PNA  Leukocytosis        Plan:   ***Neuro***  [x] Sedated with [x] Diprivan [x] Precedex    Post operative neuro assessment     ***Cardiovascular***  TTE on 9/28: EF 41%, No left ventricular thrombus. Moderate global left ventricular systolic dysfunction. Grossly right ventricular enlargement with decreased right ventricular systolic function.  9/27: Patient returned to ICU for presumed sepsis, presenting with tachypnea, fevers, tachycardia    10/1 TTE: EF 69% normal RV, no Pericardial Effusion   Invasive hemodynamic monitoring, assess perfusion indices   SR / MAP 75/ Hct 30.0/ Lactate 1.2  [x] Dobutamine- 1.5 mcgs [x] Primacor 0.2 mcs [x] Cardene 5 mG/hr  Vasopressin currently off  Continue Mexiletine for VT   Rate control with Digoxin   [x] VTE prophylaxis with Lovenox   CT Chest on 9/12:  Status post recent ascending aortic dissection repair. Small amount of fluid surrounding the ascending aorta without evidence of enhancing wall to suggest abscess.  RUE duplex on 9/12: There is a thrombosed and occluded RIJ  10/1 IR Mediport removal   Continuous reassessment of hemodynamics  Serial EKG and cardiac enzymes     ***Pulmonary***  CT Chest on 9/12: Bilateral lower lobe atelectasis with bilateral pleural effusions   CTA 9/28: RLL consolidation  Reintubated for change in mental status on 9/8, self extubated on 9/9 and reintubated again, extubated to HFO2 on 9/13, Patient has Tracheomalacia   9/27 ReIntubated for airway protection (high peak/plateau pressures on volume control. Switched to Pressure control.) ARDS ?   Full vent support   Hx of COPD / Continue bronchodilators  Monitor secretions and suction PRN   3 hours of CPAP yesterday 15/5, plan to start today as well      Mode: AC/ CMV (Assist Control/ Continuous Mandatory Ventilation)  RR (machine): 16  TV (machine): 450  FiO2: 40  PEEP: 5  ITime: 0.9  MAP: 6  PIP: 14              ***GI***  CT A/P on 9/12: Mild thickening of the cecum and ascending colon possibly representing mild nonspecific proximal colitis. Hepatic cirrhosis. The focal IPMN of the pancreas with large cyst within the tail the pancreas measuring 3.1 cm.  Colostomy bag in place, continue to monitor output   Passed FEES on 9/20, CC diet with reg liquids, pt with decreased appetite need to encourage PO intake    [x] Diet: TFs Glucerna 1.5 @ goal rate of 60cc/hr  [x] Protonix   Simethicone PRN gas      ***Renal***  Continue to monitor I/Os, BUN/Creatinine.   Replete lytes PRN  Free water 300 q6  Amezcua present       ***ID***  SCx on 9/8 +Proteus mirabilis, on 9/27 +Staphylococcus aureus  UA on 9/9+ LE, WBC 60, few yeast  /  UCx on 9/10 NG   BCx on 9/28 + Gram Positive Cocci in Clusters   BCx on 10/1 NGTD  Nystatin for thrush   Broad spectrum antibiotics (Meropenem and Vancomycin for presumed sepsis) 9/27  Meropenem D/c'ed yesterday        ***Endocrine***  [x]  DM : HbA1c 9.4%                - [x] Insulin gtt              - Need tight glycemic control to prevent wound infection.  Endo following for recs now that patient has some PO intake with tube feed supplementation         Patient requires continuous monitoring with bedside rhythm monitoring, pulse oximetry monitoring, and continuous central venous and arterial pressure monitoring; and intermittent blood gas analysis. Care plan discussed with the ICU care team.   Patient remained critical, at risk for life threatening decompensation.    I have spent 30 minutes providing critical care management to this patient.    By signing my name below, I, Kieran Plascencia, attest that this documentation has been prepared under the direction and in the presence of Bj Abbott MD   Electronically signed: Georgi Cordero, 10-03-22 @ 07:13    I, Bj Abbott, personally performed the services described in this documentation. all medical record entries made by the scribe were at my direction and in my presence. I have reviewed the chart and agree that the record reflects my personal performance and is accurate and complete  Electronically signed: Bj Abbott MD

## 2022-10-03 NOTE — ADVANCED PRACTICE NURSE CONSULT - RECOMMEDATIONS
Recommendations  1) Sacral- cleanse w/ soap & water, dry thoroughly & apply Advance No Sting barrier Film e.g cavilon 3M every 3 days. This will protect & seal area as it has been damaged previously.  2) Sween 24 apply 2x/d to intact skin  3) Continue to monitor skin.  4) Continue to turn & position, if pt OOB to chair use waffle seat cushion & limit sitting to 1/1/2 - 2 hour intervals.  5) Nutrition consult.  6) Bilateral Complete Cair boots to off load heels.   Discussed w/ Staff RADHA Shaikh @ bedside  Remain available as requested by staff
Will recommend:  1. Topical therapy- coccyx/gluteal cleft- cleanse w/incontinent cleanser, pat dry & apply Triad paste twice daily & prn soiling episodes  2. Incontinent management- incontinent cleanser, pads, pericare BID  3. Maintain on an alternating air with low air loss surface  4. Continue turning & repositioning  5. Continue w/Complete Cair air fluidized boots; ensure that the soles of the feet are not resting on the foot board of the bed  6. Nutrition optimization.  Discussed treatment plan w/staff RNs, Eugenia & Jami at bedside.

## 2022-10-04 LAB
-  CANDIDA GLABRATA: SIGNIFICANT CHANGE UP
ALBUMIN SERPL ELPH-MCNC: 2.9 G/DL — LOW (ref 3.3–5)
ALP SERPL-CCNC: 204 U/L — HIGH (ref 40–120)
ALT FLD-CCNC: 319 U/L — HIGH (ref 10–45)
ANION GAP SERPL CALC-SCNC: 8 MMOL/L — SIGNIFICANT CHANGE UP (ref 5–17)
AST SERPL-CCNC: 37 U/L — SIGNIFICANT CHANGE UP (ref 10–40)
BASE EXCESS BLDV CALC-SCNC: 0.3 MMOL/L — SIGNIFICANT CHANGE UP (ref -2–3)
BASE EXCESS BLDV CALC-SCNC: 4.1 MMOL/L — HIGH (ref -2–3)
BILIRUB SERPL-MCNC: 0.8 MG/DL — SIGNIFICANT CHANGE UP (ref 0.2–1.2)
BLD GP AB SCN SERPL QL: NEGATIVE — SIGNIFICANT CHANGE UP
BUN SERPL-MCNC: 31 MG/DL — HIGH (ref 7–23)
CALCIUM SERPL-MCNC: 8.2 MG/DL — LOW (ref 8.4–10.5)
CHLORIDE SERPL-SCNC: 118 MMOL/L — HIGH (ref 96–108)
CO2 BLDV-SCNC: 27 MMOL/L — HIGH (ref 22–26)
CO2 BLDV-SCNC: 30 MMOL/L — HIGH (ref 22–26)
CO2 SERPL-SCNC: 24 MMOL/L — SIGNIFICANT CHANGE UP (ref 22–31)
CREAT SERPL-MCNC: 0.48 MG/DL — LOW (ref 0.5–1.3)
EGFR: 99 ML/MIN/1.73M2 — SIGNIFICANT CHANGE UP
GAS PNL BLDA: SIGNIFICANT CHANGE UP
GAS PNL BLDA: SIGNIFICANT CHANGE UP
GAS PNL BLDV: SIGNIFICANT CHANGE UP
GAS PNL BLDV: SIGNIFICANT CHANGE UP
GLUCOSE BLDC GLUCOMTR-MCNC: 103 MG/DL — HIGH (ref 70–99)
GLUCOSE BLDC GLUCOMTR-MCNC: 116 MG/DL — HIGH (ref 70–99)
GLUCOSE BLDC GLUCOMTR-MCNC: 121 MG/DL — HIGH (ref 70–99)
GLUCOSE BLDC GLUCOMTR-MCNC: 134 MG/DL — HIGH (ref 70–99)
GLUCOSE BLDC GLUCOMTR-MCNC: 136 MG/DL — HIGH (ref 70–99)
GLUCOSE BLDC GLUCOMTR-MCNC: 139 MG/DL — HIGH (ref 70–99)
GLUCOSE BLDC GLUCOMTR-MCNC: 142 MG/DL — HIGH (ref 70–99)
GLUCOSE BLDC GLUCOMTR-MCNC: 142 MG/DL — HIGH (ref 70–99)
GLUCOSE BLDC GLUCOMTR-MCNC: 158 MG/DL — HIGH (ref 70–99)
GLUCOSE BLDC GLUCOMTR-MCNC: 159 MG/DL — HIGH (ref 70–99)
GLUCOSE BLDC GLUCOMTR-MCNC: 170 MG/DL — HIGH (ref 70–99)
GLUCOSE BLDC GLUCOMTR-MCNC: 177 MG/DL — HIGH (ref 70–99)
GLUCOSE BLDC GLUCOMTR-MCNC: 181 MG/DL — HIGH (ref 70–99)
GLUCOSE BLDC GLUCOMTR-MCNC: 204 MG/DL — HIGH (ref 70–99)
GLUCOSE BLDC GLUCOMTR-MCNC: 216 MG/DL — HIGH (ref 70–99)
GLUCOSE BLDC GLUCOMTR-MCNC: 224 MG/DL — HIGH (ref 70–99)
GLUCOSE BLDC GLUCOMTR-MCNC: 246 MG/DL — HIGH (ref 70–99)
GLUCOSE BLDC GLUCOMTR-MCNC: 255 MG/DL — HIGH (ref 70–99)
GLUCOSE SERPL-MCNC: 199 MG/DL — HIGH (ref 70–99)
GRAM STN FLD: SIGNIFICANT CHANGE UP
GRAM STN FLD: SIGNIFICANT CHANGE UP
HBV CORE AB SER-ACNC: SIGNIFICANT CHANGE UP
HBV SURFACE AB SER-ACNC: SIGNIFICANT CHANGE UP
HBV SURFACE AG SER-ACNC: SIGNIFICANT CHANGE UP
HCO3 BLDV-SCNC: 25 MMOL/L — SIGNIFICANT CHANGE UP (ref 22–29)
HCO3 BLDV-SCNC: 28 MMOL/L — SIGNIFICANT CHANGE UP (ref 22–29)
HCT VFR BLD CALC: 28.7 % — LOW (ref 34.5–45)
HCV AB S/CO SERPL IA: 0.07 S/CO — SIGNIFICANT CHANGE UP (ref 0–0.99)
HCV AB SERPL-IMP: SIGNIFICANT CHANGE UP
HCV RNA SPEC NAA+PROBE-LOG IU: SIGNIFICANT CHANGE UP
HCV RNA SPEC NAA+PROBE-LOG IU: SIGNIFICANT CHANGE UP LOGIU/ML
HGB BLD-MCNC: 8.6 G/DL — LOW (ref 11.5–15.5)
HIV 1 & 2 AB SERPL IA.RAPID: SIGNIFICANT CHANGE UP
HIV 1+2 AB+HIV1 P24 AG SERPL QL IA: SIGNIFICANT CHANGE UP
HIV-1 VIRAL LOAD RESULT: SIGNIFICANT CHANGE UP
HIV1 RNA # SERPL NAA+PROBE: SIGNIFICANT CHANGE UP COPIES/ML
HIV1 RNA SER-IMP: SIGNIFICANT CHANGE UP
HIV1 RNA SERPL NAA+PROBE-ACNC: SIGNIFICANT CHANGE UP
HIV1 RNA SERPL NAA+PROBE-LOG#: SIGNIFICANT CHANGE UP LG COP/ML
HOROWITZ INDEX BLDV+IHG-RTO: 40 — SIGNIFICANT CHANGE UP
HOROWITZ INDEX BLDV+IHG-RTO: 40 — SIGNIFICANT CHANGE UP
MAGNESIUM SERPL-MCNC: 2.4 MG/DL — SIGNIFICANT CHANGE UP (ref 1.6–2.6)
MCHC RBC-ENTMCNC: 24.6 PG — LOW (ref 27–34)
MCHC RBC-ENTMCNC: 30 GM/DL — LOW (ref 32–36)
MCV RBC AUTO: 82 FL — SIGNIFICANT CHANGE UP (ref 80–100)
METHOD TYPE: SIGNIFICANT CHANGE UP
NRBC # BLD: 9 /100 WBCS — HIGH (ref 0–0)
PCO2 BLDV: 41 MMHG — SIGNIFICANT CHANGE UP (ref 39–42)
PCO2 BLDV: 42 MMHG — SIGNIFICANT CHANGE UP (ref 39–42)
PH BLDV: 7.39 — SIGNIFICANT CHANGE UP (ref 7.32–7.43)
PH BLDV: 7.45 — HIGH (ref 7.32–7.43)
PHOSPHATE SERPL-MCNC: 2.9 MG/DL — SIGNIFICANT CHANGE UP (ref 2.5–4.5)
PLATELET # BLD AUTO: 147 K/UL — LOW (ref 150–400)
PO2 BLDV: 40 MMHG — SIGNIFICANT CHANGE UP (ref 25–45)
PO2 BLDV: 46 MMHG — HIGH (ref 25–45)
POTASSIUM SERPL-MCNC: 4.7 MMOL/L — SIGNIFICANT CHANGE UP (ref 3.5–5.3)
POTASSIUM SERPL-SCNC: 4.7 MMOL/L — SIGNIFICANT CHANGE UP (ref 3.5–5.3)
PROT SERPL-MCNC: 5.3 G/DL — LOW (ref 6–8.3)
RBC # BLD: 3.5 M/UL — LOW (ref 3.8–5.2)
RBC # FLD: 27.1 % — HIGH (ref 10.3–14.5)
RH IG SCN BLD-IMP: POSITIVE — SIGNIFICANT CHANGE UP
SAO2 % BLDV: 72.1 % — SIGNIFICANT CHANGE UP (ref 67–88)
SAO2 % BLDV: 73.7 % — SIGNIFICANT CHANGE UP (ref 67–88)
SODIUM SERPL-SCNC: 150 MMOL/L — HIGH (ref 135–145)
SPECIMEN SOURCE: SIGNIFICANT CHANGE UP
T PALLIDUM AB TITR SER: NEGATIVE — SIGNIFICANT CHANGE UP
VANCOMYCIN TROUGH SERPL-MCNC: 16 UG/ML — SIGNIFICANT CHANGE UP (ref 10–20)
WBC # BLD: 20.03 K/UL — HIGH (ref 3.8–10.5)
WBC # FLD AUTO: 20.03 K/UL — HIGH (ref 3.8–10.5)

## 2022-10-04 PROCEDURE — 71045 X-RAY EXAM CHEST 1 VIEW: CPT | Mod: 26

## 2022-10-04 PROCEDURE — 99233 SBSQ HOSP IP/OBS HIGH 50: CPT

## 2022-10-04 PROCEDURE — 99232 SBSQ HOSP IP/OBS MODERATE 35: CPT

## 2022-10-04 PROCEDURE — 99291 CRITICAL CARE FIRST HOUR: CPT

## 2022-10-04 RX ORDER — HYDROCORTISONE 20 MG
25 TABLET ORAL EVERY 8 HOURS
Refills: 0 | Status: DISCONTINUED | OUTPATIENT
Start: 2022-10-04 | End: 2022-10-05

## 2022-10-04 RX ORDER — POTASSIUM CHLORIDE 20 MEQ
20 PACKET (EA) ORAL ONCE
Refills: 0 | Status: COMPLETED | OUTPATIENT
Start: 2022-10-04 | End: 2022-10-04

## 2022-10-04 RX ORDER — NICARDIPINE HYDROCHLORIDE 30 MG/1
5 CAPSULE, EXTENDED RELEASE ORAL
Qty: 40 | Refills: 0 | Status: DISCONTINUED | OUTPATIENT
Start: 2022-10-04 | End: 2022-10-06

## 2022-10-04 RX ORDER — CASPOFUNGIN ACETATE 7 MG/ML
50 INJECTION, POWDER, LYOPHILIZED, FOR SOLUTION INTRAVENOUS EVERY 24 HOURS
Refills: 0 | Status: DISCONTINUED | OUTPATIENT
Start: 2022-10-05 | End: 2022-10-07

## 2022-10-04 RX ORDER — ALBUMIN HUMAN 25 %
50 VIAL (ML) INTRAVENOUS
Refills: 0 | Status: COMPLETED | OUTPATIENT
Start: 2022-10-04 | End: 2022-10-04

## 2022-10-04 RX ORDER — CASPOFUNGIN ACETATE 7 MG/ML
70 INJECTION, POWDER, LYOPHILIZED, FOR SOLUTION INTRAVENOUS ONCE
Refills: 0 | Status: COMPLETED | OUTPATIENT
Start: 2022-10-04 | End: 2022-10-04

## 2022-10-04 RX ADMIN — Medication 50 MILLILITER(S): at 18:29

## 2022-10-04 RX ADMIN — Medication 250 MILLIGRAM(S): at 05:10

## 2022-10-04 RX ADMIN — Medication 3 MILLILITER(S): at 17:02

## 2022-10-04 RX ADMIN — Medication 500000 UNIT(S): at 11:43

## 2022-10-04 RX ADMIN — Medication 25 MILLIGRAM(S): at 21:41

## 2022-10-04 RX ADMIN — Medication 0.5 MILLIGRAM(S): at 17:02

## 2022-10-04 RX ADMIN — Medication 50 MILLILITER(S): at 18:01

## 2022-10-04 RX ADMIN — MEXILETINE HYDROCHLORIDE 200 MILLIGRAM(S): 150 CAPSULE ORAL at 05:10

## 2022-10-04 RX ADMIN — Medication 3 MILLILITER(S): at 11:50

## 2022-10-04 RX ADMIN — CHLORHEXIDINE GLUCONATE 1 APPLICATION(S): 213 SOLUTION TOPICAL at 04:21

## 2022-10-04 RX ADMIN — Medication 0.5 MILLIGRAM(S): at 05:33

## 2022-10-04 RX ADMIN — DEXMEDETOMIDINE HYDROCHLORIDE IN 0.9% SODIUM CHLORIDE 8.38 MICROGRAM(S)/KG/HR: 4 INJECTION INTRAVENOUS at 12:23

## 2022-10-04 RX ADMIN — Medication 3 MILLILITER(S): at 00:57

## 2022-10-04 RX ADMIN — Medication 1 APPLICATION(S): at 11:44

## 2022-10-04 RX ADMIN — Medication 125 MICROGRAM(S): at 11:43

## 2022-10-04 RX ADMIN — ENOXAPARIN SODIUM 40 MILLIGRAM(S): 100 INJECTION SUBCUTANEOUS at 11:42

## 2022-10-04 RX ADMIN — Medication 250 MILLIGRAM(S): at 18:08

## 2022-10-04 RX ADMIN — Medication 20 MILLIEQUIVALENT(S): at 18:34

## 2022-10-04 RX ADMIN — CHLORHEXIDINE GLUCONATE 15 MILLILITER(S): 213 SOLUTION TOPICAL at 18:07

## 2022-10-04 RX ADMIN — Medication 3 MILLILITER(S): at 05:33

## 2022-10-04 RX ADMIN — Medication 50 MILLIGRAM(S): at 05:10

## 2022-10-04 RX ADMIN — CASPOFUNGIN ACETATE 260 MILLIGRAM(S): 7 INJECTION, POWDER, LYOPHILIZED, FOR SOLUTION INTRAVENOUS at 13:17

## 2022-10-04 RX ADMIN — MEXILETINE HYDROCHLORIDE 200 MILLIGRAM(S): 150 CAPSULE ORAL at 13:29

## 2022-10-04 RX ADMIN — NICARDIPINE HYDROCHLORIDE 25 MG/HR: 30 CAPSULE, EXTENDED RELEASE ORAL at 12:22

## 2022-10-04 RX ADMIN — Medication 500000 UNIT(S): at 01:09

## 2022-10-04 RX ADMIN — Medication 25 MILLIGRAM(S): at 13:29

## 2022-10-04 RX ADMIN — Medication 500000 UNIT(S): at 05:10

## 2022-10-04 RX ADMIN — MEXILETINE HYDROCHLORIDE 200 MILLIGRAM(S): 150 CAPSULE ORAL at 21:41

## 2022-10-04 RX ADMIN — PANTOPRAZOLE SODIUM 40 MILLIGRAM(S): 20 TABLET, DELAYED RELEASE ORAL at 11:43

## 2022-10-04 RX ADMIN — Medication 1 TABLET(S): at 11:42

## 2022-10-04 RX ADMIN — CHLORHEXIDINE GLUCONATE 15 MILLILITER(S): 213 SOLUTION TOPICAL at 05:10

## 2022-10-04 NOTE — CHART NOTE - NSCHARTNOTEFT_GEN_A_CORE
Nutrition Follow Up Note  Patient seen for: malnutrition follow up.    Chart reviewed, events noted. Pt is a 71F w/h/o uncontrolled T2DM (A1C 9.4%) on basal/bolus insulin PTA. Unknown DM complications. Also COPD, secondary adrenal Insufficiency on chronic steroids, colorectal cancer s/p resection (colostomy bag), Chronic A fib on Eliquis, and tracheomalacia and multiple intubations, recent dx of OM presented to ED at [facility] with epigastric pain, belching and central chest pain. Found on CTA to have type A aortic dissection and transferred to [facility] for surgical evaluation by Dr. Cabrera. Now POD# 16 Type A aortic dissection repair.     Source: [] Patient       [x] EMR        [] RN        [] Family at bedside       [x] Other: team    -If unable to interview patient: [x] Trach/Vent/BiPAP  [] Disoriented/confused/inappropriate to interview    Diet, NPO with Tube Feed:   Tube Feeding Modality: Nasogastric  Glucerna 1.5 Chago (GLUCERNA1.5RTH)  Total Volume for 24 Hours (mL): 1440  Continuous  Starting Tube Feed Rate {mL per Hour}: 60  Until Goal Tube Feed Rate (mL per Hour): 60  Tube Feed Duration (in Hours): 24  Tube Feed Start Time: 10:00 (10-02-22 @ 11:39)    EN Order Provides: 1440mL total volume, 2160kcal, 119g protein, 1093mL free water.    Current Pump Rate: N/A  EN provision per flow sheets:   - 10/3: 1130mL   - 10/2: 800mL   - 10/1: 320mL   - : 620mL   - : 260mL  5-Day EN average: 626mL or 43% goal volume    Nutrition-related concerns:   - Pt currently intubated, sedated on Precedex and propofol @ 8.1mL/hr ( x 24hr would provide ~214kcal). Pt received ~188kcal from propofol in previous 24hr. Vaso off, pt on Milrinone gtt.    - Endocrinology following for BG management, pt requiring insulin gtt.   - IVF: NS @ 50mL/hr. Hypernatremia noted - FWF ordered of 250mL q12H.    GI: +colostomy, 400mL output 10/4.   Bowel Regimen? [] Yes   [x] No   - Antibiotic regimen noted    Weights:   Daily Weight in k.8 (10-04), Weight in k.7 (10-03), Weight in k.4 (10-02), Weight in k.8 (10-01), Weight in k.4 (), Weight in k.1 (), Weight in k.5 ()   - wt fluctuations noted, will continue to monitor     MEDICATIONS  (STANDING):  albuterol/ipratropium for Nebulization 3 milliLiter(s) Nebulizer every 6 hours  buDESOnide    Inhalation Suspension 0.5 milliGRAM(s) Inhalation every 12 hours  chlorhexidine 0.12% Liquid 15 milliLiter(s) Oral Mucosa every 12 hours  chlorhexidine 2% Cloths 1 Application(s) Topical <User Schedule>  collagenase Ointment 1 Application(s) Topical daily  dexMEDEtomidine Infusion 0.5 MICROgram(s)/kG/Hr (8.38 mL/Hr) IV Continuous <Continuous>  digoxin  Injectable 125 MICROGram(s) IV Push daily  enoxaparin Injectable 40 milliGRAM(s) SubCutaneous every 24 hours  hydrocortisone sodium succinate Injectable 50 milliGRAM(s) IV Push every 8 hours  insulin regular Infusion 3 Unit(s)/Hr (3 mL/Hr) IV Continuous <Continuous>  mexiletine 200 milliGRAM(s) Oral every 8 hours  milrinone Infusion 0.2 MICROgram(s)/kG/Min (4.02 mL/Hr) IV Continuous <Continuous>  multivitamin 1 Tablet(s) Oral daily  nystatin    Suspension 228003 Unit(s) Oral every 6 hours  pantoprazole  Injectable 40 milliGRAM(s) IV Push daily  propofol Infusion 12.438 MICROgram(s)/kG/Min (5 mL/Hr) IV Continuous <Continuous>  sodium chloride 0.9%. 1000 milliLiter(s) (50 mL/Hr) IV Continuous <Continuous>  vancomycin  IVPB 1000 milliGRAM(s) IV Intermittent every 12 hours  vancomycin  IVPB      vasopressin Infusion 0.04 Unit(s)/Min (6 mL/Hr) IV Continuous <Continuous>    MEDICATIONS  (PRN):  simethicone drops 80 milliGRAM(s) Oral every 12 hours PRN Gas    Pertinent Labs: 10-04 @ 00:31: Na 150<H>, BUN 31<H>, Cr 0.48<L>, <H>, K+ 4.7, Phos 2.9, Mg 2.4, Alk Phos 204<H>, ALT/SGPT 319<H>, AST/SGOT 37, HbA1c --    A1C with Estimated Average Glucose Result: 9.4 % (22 @ 16:46)  A1C with Estimated Average Glucose Result: 9.2 % (22 @ 07:36)  A1C with Estimated Average Glucose Result: 8.0 % (05-10-22 @ 12:26)    Finger Sticks:  POCT Blood Glucose.: 350 mg/dL ( @ 05:46)  POCT Blood Glucose.: 241 mg/dL ( @ 02:07)  POCT Blood Glucose.: 132 mg/dL ( @ 22:31)  POCT Blood Glucose.: 167 mg/dL ( @ 19:49)  POCT Blood Glucose.: 244 mg/dL ( @ 16:10)  POCT Blood Glucose.: 339 mg/dL ( @ 11:20)    Skin per nursing documentation: No noted pressure injuries as per documentation, midsternal incision  Edema: +1 bilateral arm    Based on UBW 62.3kg with consideration for intubation  Estimated Energy Needs: 1558 - 1869kcal (25 - 30kcal/kg)   Estimated Protein Needs: 87 - 112g (1.4 - 1.8g/kg)  Estimated Fluid Needs: per team.  [facility] Equation: 1461kcal ()    Previous Nutrition Diagnosis: Moderate Acute Malnutrition   Nutrition Diagnosis is: [x] ongoing  [] resolved [] not applicable     New Nutrition Diagnosis:     Nutrition Care Plan:  [x] In Progress  [] Achieved  [] Not applicable    Nutrition Interventions:     Education Provided:       [] Yes:  [x] No: N/A    Recommendations:   1.     Monitoring and Evaluation:   Continue to monitor nutritional intake, tolerance to diet prescription, weights, labs, skin integrity    RD remains available upon request and will follow up per protocol    Deedee Rosas MS, RD, CDN, [facility] Pager #[phone] Nutrition Follow Up Note  Patient seen for: malnutrition follow up.    Chart reviewed, events noted. Pt is a 71F w/h/o uncontrolled T2DM (A1C 9.4%) on basal/bolus insulin PTA. Unknown DM complications. Also COPD, secondary adrenal Insufficiency on chronic steroids, colorectal cancer s/p resection (colostomy bag), Chronic A fib on Eliquis, and tracheomalacia and multiple intubations, recent dx of OM presented to ED at [facility] with epigastric pain, belching and central chest pain. Found on CTA to have type A aortic dissection and transferred to [facility] for surgical evaluation by Dr. Cabrera. Now POD# 16 Type A aortic dissection repair.     Source: [] Patient       [x] EMR        [] RN        [] Family at bedside       [x] Other: team    -If unable to interview patient: [x] Trach/Vent/BiPAP  [] Disoriented/confused/inappropriate to interview    Diet, NPO with Tube Feed:   Tube Feeding Modality: Nasogastric  Glucerna 1.5 Chago (GLUCERNA1.5RTH)  Total Volume for 24 Hours (mL): 1440  Continuous  Starting Tube Feed Rate {mL per Hour}: 60  Until Goal Tube Feed Rate (mL per Hour): 60  Tube Feed Duration (in Hours): 24  Tube Feed Start Time: 10:00 (10-02-22 @ 11:39)    EN Order Provides: 1440mL total volume, 2160kcal, 119g protein, 1093mL free water.    Current Pump Rate: N/A  EN provision per flow sheets:   - 10/3: 1130mL   - 10/2: 800mL   - 10/1: 320mL - feeds restarted at 40mL/hr   - : 620mL - TF off for procedure   - : 260mL  5-Day EN average: 626mL or 43% goal volume    Nutrition-related concerns:   - Pt currently intubated, sedated on Precedex and propofol @ 8.1mL/hr ( x 24hr would provide ~214kcal). Pt received ~188kcal from propofol in previous 24hr. Vaso off, pt on Milrinone gtt.    - Endocrinology following for BG management, pt requiring insulin gtt.   - IVF: NS @ 50mL/hr. Hypernatremia noted - FWF ordered of 250mL q12H.   - ENT consulted for possible trach next week    GI: +colostomy, 400mL output 10/4.   Bowel Regimen? [] Yes   [x] No   - Antibiotic regimen noted    Weights:   Daily Weight in k.8 (10-04), Weight in k.7 (10-03), Weight in k.4 (10-02), Weight in k.8 (10-01), Weight in k.4 (), Weight in k.1 (), Weight in k.5 ()   - wt fluctuations noted, will continue to monitor     MEDICATIONS  (STANDING):  albuterol/ipratropium for Nebulization 3 milliLiter(s) Nebulizer every 6 hours  buDESOnide    Inhalation Suspension 0.5 milliGRAM(s) Inhalation every 12 hours  chlorhexidine 0.12% Liquid 15 milliLiter(s) Oral Mucosa every 12 hours  chlorhexidine 2% Cloths 1 Application(s) Topical <User Schedule>  collagenase Ointment 1 Application(s) Topical daily  dexMEDEtomidine Infusion 0.5 MICROgram(s)/kG/Hr (8.38 mL/Hr) IV Continuous <Continuous>  digoxin  Injectable 125 MICROGram(s) IV Push daily  enoxaparin Injectable 40 milliGRAM(s) SubCutaneous every 24 hours  hydrocortisone sodium succinate Injectable 50 milliGRAM(s) IV Push every 8 hours  insulin regular Infusion 3 Unit(s)/Hr (3 mL/Hr) IV Continuous <Continuous>  mexiletine 200 milliGRAM(s) Oral every 8 hours  milrinone Infusion 0.2 MICROgram(s)/kG/Min (4.02 mL/Hr) IV Continuous <Continuous>  multivitamin 1 Tablet(s) Oral daily  nystatin    Suspension 869028 Unit(s) Oral every 6 hours  pantoprazole  Injectable 40 milliGRAM(s) IV Push daily  propofol Infusion 12.438 MICROgram(s)/kG/Min (5 mL/Hr) IV Continuous <Continuous>  sodium chloride 0.9%. 1000 milliLiter(s) (50 mL/Hr) IV Continuous <Continuous>  vancomycin  IVPB 1000 milliGRAM(s) IV Intermittent every 12 hours  vancomycin  IVPB      vasopressin Infusion 0.04 Unit(s)/Min (6 mL/Hr) IV Continuous <Continuous>    MEDICATIONS  (PRN):  simethicone drops 80 milliGRAM(s) Oral every 12 hours PRN Gas    Pertinent Labs: 10-04 @ 00:31: Na 150<H>, BUN 31<H>, Cr 0.48<L>, <H>, K+ 4.7, Phos 2.9, Mg 2.4, Alk Phos 204<H>, ALT/SGPT 319<H>, AST/SGOT 37, HbA1c --    A1C with Estimated Average Glucose Result: 9.4 % (22 @ 16:46)  A1C with Estimated Average Glucose Result: 9.2 % (22 @ 07:36)  A1C with Estimated Average Glucose Result: 8.0 % (05-10-22 @ 12:26)    Finger Sticks:   POCT Blood Glucose.: 142 mg/dL (04 Oct 2022 09:04)  POCT Blood Glucose.: 181 mg/dL (04 Oct 2022 07:59)  POCT Blood Glucose.: 170 mg/dL (04 Oct 2022 06:55)  POCT Blood Glucose.: 177 mg/dL (04 Oct 2022 06:10)  POCT Blood Glucose.: 204 mg/dL (04 Oct 2022 04:59)  POCT Blood Glucose.: 216 mg/dL (04 Oct 2022 04:12)  POCT Blood Glucose.: 224 mg/dL (04 Oct 2022 02:54)  POCT Blood Glucose.: 255 mg/dL (04 Oct 2022 01:56)  POCT Blood Glucose.: 246 mg/dL (04 Oct 2022 01:08)  POCT Blood Glucose.: 199 mg/dL (03 Oct 2022 23:37)  POCT Blood Glucose.: 75 mg/dL (03 Oct 2022 23:15)  POCT Blood Glucose.: 78 mg/dL (03 Oct 2022 23:00)  POCT Blood Glucose.: 114 mg/dL (03 Oct 2022 21:55)  POCT Blood Glucose.: 202 mg/dL (03 Oct 2022 21:03)  POCT Blood Glucose.: 193 mg/dL (03 Oct 2022 19:47)  POCT Blood Glucose.: 210 mg/dL (03 Oct 2022 18:50)  POCT Blood Glucose.: 224 mg/dL (03 Oct 2022 18:23)  POCT Blood Glucose.: 215 mg/dL (03 Oct 2022 17:07)  POCT Blood Glucose.: 149 mg/dL (03 Oct 2022 16:06)  POCT Blood Glucose.: 108 mg/dL (03 Oct 2022 15:17)  POCT Blood Glucose.: 98 mg/dL (03 Oct 2022 14:28)  POCT Blood Glucose.: 118 mg/dL (03 Oct 2022 13:33)  POCT Blood Glucose.: 170 mg/dL (03 Oct 2022 12:32)  POCT Blood Glucose.: 150 mg/dL (03 Oct 2022 11:15)  POCT Blood Glucose.: 153 mg/dL (03 Oct 2022 10:28)    Skin per nursing documentation: No noted pressure injuries as per documentation, midsternal incision  Edema: +1 bilateral arm    Based on UBW 62.3kg with consideration for intubation  Estimated Energy Needs: 1558 - 1869kcal (25 - 30kcal/kg)   Estimated Protein Needs: 87 - 112g (1.4 - 1.8g/kg)  Estimated Fluid Needs: per team.    Previous Nutrition Diagnosis: Moderate Acute Malnutrition   Nutrition Diagnosis is: [x] ongoing  [] resolved [] not applicable     New Nutrition Diagnosis: n/a    Nutrition Care Plan:  [x] In Progress - being addressed with EN as feasible  [] Achieved  [] Not applicable    Nutrition Interventions:     Education Provided:       [] Yes:  [x] No: N/A    Recommendations:   1. To better estimated energy/protein needs, recommend continue Glucerna 1.5 with NEW goal rate of 55mL/hr x 24hr to provide 1320mL total volume, 1980kcal, 109g protein, 1002mL free water. Regimen meets 31kcal/kg, 1.7g/kg protein based on UBW 62.3kg.    - Defer additional free water flushes to team. Consider increase in free water flushes in setting of hypernatremia.    - Monitor ostomy output.  2. Continue multivitamin as medically feasible.  3. Monitor GI tolerance to feeds, RD remains available to adjust TF regimen/formulary as needed/upon request.     Monitoring and Evaluation:   Continue to monitor nutritional intake, tolerance to diet prescription, weights, labs, skin integrity    RD remains available upon request and will follow up per protocol    Deedee Rosas MS, RD, CDN, [facility] Pager #[phone]

## 2022-10-04 NOTE — PROGRESS NOTE ADULT - SUBJECTIVE AND OBJECTIVE BOX
INFECTIOUS DISEASES FOLLOW UP-- Fouzia Calixto  [phone]    This is a follow up note for this  72yFemale with  Dissection of thoracic aorta  awake and alert  answers questions non verbally  Candida growing in a single blood culture in addition to her MRSA bacteremia        ROS:  CONSTITUTIONAL:  intubated, awake alert    Allergies    aspirin (Short breath)  Avelox (Short breath; Pruritus)  cefepime (Anaphylaxis)  codeine (Short breath)  Dilaudid (Short breath)  iodine (Short breath; Swelling)  penicillin (Anaphylaxis)  shellfish (Anaphylaxis)  tetanus toxoid (Short breath)  Valium (Short breath)    Intolerances        ANTIBIOTICS/RELEVANT:  antimicrobials  vancomycin  IVPB 1000 milliGRAM(s) IV Intermittent every 12 hours  vancomycin  IVPB        immunologic:    OTHER:  albuterol/ipratropium for Nebulization 3 milliLiter(s) Nebulizer every 6 hours  buDESOnide    Inhalation Suspension 0.5 milliGRAM(s) Inhalation every 12 hours  chlorhexidine 0.12% Liquid 15 milliLiter(s) Oral Mucosa every 12 hours  chlorhexidine 2% Cloths 1 Application(s) Topical <User Schedule>  collagenase Ointment 1 Application(s) Topical daily  dexMEDEtomidine Infusion 0.5 MICROgram(s)/kG/Hr IV Continuous <Continuous>  digoxin  Injectable 125 MICROGram(s) IV Push daily  enoxaparin Injectable 40 milliGRAM(s) SubCutaneous every 24 hours  hydrocortisone sodium succinate Injectable 25 milliGRAM(s) IV Push every 8 hours  insulin regular Infusion 3 Unit(s)/Hr IV Continuous <Continuous>  mexiletine 200 milliGRAM(s) Oral every 8 hours  milrinone Infusion 0.1 MICROgram(s)/kG/Min IV Continuous <Continuous>  multivitamin 1 Tablet(s) Oral daily  niCARdipine Infusion 5 mG/Hr IV Continuous <Continuous>  pantoprazole  Injectable 40 milliGRAM(s) IV Push daily  propofol Infusion 12.438 MICROgram(s)/kG/Min IV Continuous <Continuous>  simethicone drops 80 milliGRAM(s) Oral every 12 hours PRN  sodium chloride 0.9%. 1000 milliLiter(s) IV Continuous <Continuous>      Objective:  Vital Signs Last 24 Hrs  T(C): 37.2 (04 Oct 2022 12:00), Max: 37.2 (04 Oct 2022 12:00)  T(F): 99 (04 Oct 2022 12:00), Max: 99 (04 Oct 2022 12:00)  HR: 71 (04 Oct 2022 17:03) (55 - 82)  BP: --  BP(mean): --  RR: 20 (04 Oct 2022 15:00) (11 - 23)  SpO2: 100% (04 Oct 2022 17:03) (98% - 100%)    Parameters below as of 04 Oct 2022 17:03  Patient On (Oxygen Delivery Method): ventilator        PHYSICAL EXAM:  Constitutional:no acute distress  Eyes:EBER, EOMI  Ear/Nose/Throat: no oral lesions, Et tube with purulent secretions	  Respiratory: clear BL  sternotomy dressing CDI  mediport removed from left chest  Cardiovascular: S1S2  Gastrointestinal:soft, (+) BS, no tenderness  ostomy functioning brown stool  Extremities:no e/e/c  No Lymphadenopathy  IV sites not inflammed.    LABS:                        8.6    20.03 )-----------( 147      ( 04 Oct 2022 00:31 )             28.7     10-04    150<H>  |  118<H>  |  31<H>  ----------------------------<  199<H>  4.7   |  24  |  0.48<L>    Ca    8.2<L>      04 Oct 2022 00:31  Phos  2.9     10-04  Mg     2.4     10-04    HIV-1/2 Combo Result: Nonreact: Nonreactive: HIV-1 p24 antigen and HIV-1/HIV-2 antibodies were not  detected. Nonreactive results may be due to antigen and/or antibody  levels that are below the limit of detection of this assay.  Reactive: Presumptive evidence of HIV-1 p24 antigen and/or HIV-1/HIV-2  antibodies. This is a preliminary result. Further confirmatory testing  according to the CDC/[facility] HIV testing algorithm will follow, and such  confirmatory results must be considered in making a diagnosis related to  HIV infection. Further HIV tests include HIV-1/HIV-2 antibody  differentiation immunoassay and subsequent nucleic acid testing if needed.  This result should be interpreted in conjunction with the patient’s  clinical presentation, history, and other laboratory results. If the  result is inconsistent with clinical evidence, additional testing is  suggested to confirm the result. (10.04.22 @ 00:31)    TPro  5.3<L>  /  Alb  2.9<L>  /  TBili  0.8  /  DBili  x   /  AST  37  /  ALT  319<H>  /  AlkPhos  204<H>  10-04      Culture - Blood (09.30.22 @ 11:45)    Gram Stain:   Growth in aerobic bottle: Yeast like cells    -  Candida glabrata: Detec    Specimen Source: .Blood Blood    Organism: Blood Culture PCR    Culture Results:   Growth in aerobic bottle: Yeast like cells  ***Blood Panel PCR results on this specimen are available  approximately 3 hours after the Gram stain result.***  Gram stain, PCR, and/or culture results may not always  correspond due to difference in methodologies.  ************************************************************  This PCR assay was performed by multiplex PCR. This  Assay tests for 66 bacterial and resistance gene targets.  Please refer to the [facility] test directory  at [email or web address] for details.    Organism Identification: Blood Culture PCR    Method Type: PCR        MICROBIOLOGY:            RECENT CULTURES:  10-01 @ 18:55  .Surgical Swab Port Device  --  --  --    No growth  --  10-01 @ 01:47  .Blood Blood-Peripheral  --  --  --    No growth to date.  --  10-01 @ 01:24  .Blood Blood-Peripheral  --  --  --    No growth to date.  --  09-30 @ 11:45  .Blood Blood  Blood Culture PCR  Blood Culture PCR  PCR    No growth to date.  --  09-29 @ 18:33  .Bronchial Bronchial Lavage  --  --  --    Normal Respiratory Jessica present  --  09-28 @ 01:48  .Blood Blood-Peripheral  Blood Culture PCR  Methicillin resistant Staphylococcus aureus  Blood Culture PCR  PCR    Growth in anaerobic bottle: Methicillin Resistant Staphylococcus aureus  ***Blood Panel PCR results on this specimen are available  approximately 3 hours after the Gram stain result.***  Gram stain, PCR, and/or culture results may not always  correspond due to difference in methodologies.  ************************************************************  This PCR assay was performed by multiplex PCR. This  Assay tests for 66 bacterial and resistance gene targets.  Please refer to the [facility] test directory  at [email or web address] for details.  --  09-27 @ 17:54  Trach Asp Tracheal Aspirate  Methicillin resistant Staphylococcus aureus  Methicillin resistant Staphylococcus aureus  GARRETT    Moderate Methicillin Resistant Staphylococcus aureus  Normal Respiratory Jessica present  --      RADIOLOGY & ADDITIONAL STUDIES:    < from: Xray Chest 1 View- PORTABLE-Routine (Xray Chest 1 View- PORTABLE-Routine in AM.) (10.04.22 @ 03:17) >  FINDINGS:    LINES/TUBES: Endotracheal tube terminates mid trachea. Enteric tube tip   is in the stomach. Left IJ central venous catheter tip is in the SVC.    LUNGS: The lungs are clear. Left basilar opacity.    PLEURA: No pneumothorax.    HEART AND MEDIASTINUM: Heart size is not well assessed in this   projection. Status post aortic valve replacement.    SKELETON: Median sternotomy.      IMPRESSION:    Lines and tubes in good position.  Left basilar opacity likely atelectasis or effusion    < end of copied text >

## 2022-10-04 NOTE — PROGRESS NOTE ADULT - ASSESSMENT
71 year-old female with a history of DM2, CAD, A-Fib on apixaban, Severe Persistent Asthma (on chronic steroids, recently started on Tezspire), colon cancer s/p resection/chemo, and tracheobronchomalacia s/p tracheoplasty, and recent OM of the R foot s/b debridement and completed course of Vancomycin/Ertapenem for MRSA/ESBL Proteus/Corynebacterium who now presents with chest pain, found to have Type A dissection s/p repair with modified Bentall procedure and hemiarch replacement on 9/6/22. Post-op course complicated by acute hypoxemic respiratory failure, shock, anemia, and hyperglycemia requiring insulin gtt. Extubated 9/13, now awake and alert with mild encephalopathy but overall significantly improved.    Assessment:  Acute hypoxemic respiratory failure requiring intubation  Type A Aortic Dissection  Severe Persistent Asthma  History of Tracheobronchomalacia  fevers and MSSA bacteremia and tracheal aspirate material with MSSA    Fevers and MRSA bacteremia:  newly positive 1/2 blood cultures sent 9/30 with Neela glabrata  Source- most likely medi-port (which was removed)  prior cultures from 9/28 with high grade MRSA bacteremia  Continue IV vancomycin- trough 12. on 10/3 is therapeutic maintain current dosing   TTE performed 10/1 no evidence of vegetations on the valves EF improving  Will plan to treat for 4-6 weeks in the setting of post surgical repair of thoracic aorta dissection  Continue to follow CBC  Continue to follow creatinine  Continue to follow Vanco trough levels  repeat blood cultures for evidence of fungemia and bacteremia clearance  non emergent optho exam in the setting of fungemia    Would check syphilis screen    Hepatitis B,C screens negative      Discussed with CTU team    Jeff Calixto MD  Can be called via Teams  After 5pm/weekends [phone]

## 2022-10-04 NOTE — PROGRESS NOTE ADULT - ASSESSMENT
71F w/h/o uncontrolled T2DM (A1C 9.4%) on basal/bolus insulin PTA. Unknown DM complications. Also COPD, secondary adrenal Insufficiency on chronic steroids, colorectal cancer s/p resection (colostomy bag), Chronic A fib on Eliquis, and tracheomalacia and multiple intubations, recent dx of OM presented to ED at [facility] with epigastric pain, belching and central chest pain. Found on CTA to have type A aortic dissection and transferred to [facility] for surgical evaluation by Dr. Cabrera. Now s/p aortic dissection repair on 9/07/22. Endocrine consulted for assistance with uncontrolled DM/steroids for AI. Pt in ICU with ventricular dysfunction/sepsis intubated, off pressors and on TF requiring insulin drip to keep BG goal 100 [id number]. Pt remains with leukocytosis and transaminitis.       On stress steroid doses due to present critical condition and h/o adrenal insufficiency. Spoke to primary team to taper HCT as recommended since pt is off pressors  and more stable.            Problem/Plan - 3:  ·  Problem: Hyperlipidemia.   ·  Plan: Off rosuvastatin 5mg daily  Re start if not contraindicated and as per cardiology  -F/u levels as out pt.    Discussed with patient and primary  team Soosan NP  Contact via [employer] Teams during business hours  On evenings and weekends, please call [phone] or page endocrine fellow on call.   Email: [email or web address]   Please note that this patient may be followed by different provider tomorrow.    greater than 50% of the encounter was spent counseling and/or coordination of care.  26 minutes spent on total encounter; The necessity of the time spent during the encounter on this date of service was due to development of plan of care/coordination of care/glycemic control through review of labs, blood glucose values and vital signs.

## 2022-10-04 NOTE — PROGRESS NOTE ADULT - SUBJECTIVE AND OBJECTIVE BOX
DIABETES FOLLOW UP NOTE: Saw pt earlier today    Chief Complaint: Endocrine consult requested for management of T2DM    INTERVAL HX: Saw pt in CTU, intubated, now off pressors and on TFs of Glucerna at 60cc/hr. BG mostly at goal while on insulin drip requiring average of 4 units/hr. No hypoglycemia. On antibiotic for sepsis with + transaminitis. HCT dose increased over the weekend 50mg q8h today. Pt off pressors and more stable. Pt awake but unable to nod yes/no to questions.         Review of Systems:  General: As above  unable      Allergies    aspirin (Short breath)  Avelox (Short breath; Pruritus)  cefepime (Anaphylaxis)  codeine (Short breath)  Dilaudid (Short breath)  iodine (Short breath; Swelling)  penicillin (Anaphylaxis)  shellfish (Anaphylaxis)  tetanus toxoid (Short breath)  Valium (Short breath)    Intolerances      MEDICATIONS    hydrocortisone sodium succinate Injectable 25 milliGRAM(s) IV Push every 8 hours  insulin regular Infusion 3 Unit(s)/Hr (3 mL/Hr) IV Continuous <Continuous>  vancomycin  IVPB 1000 milliGRAM(s) IV Intermittent every 12 hours    PHYSICAL EXAM:  VITALS: T(C): 37.2 (10-04-22 @ 12:00)  T(F): 99 (10-04-22 @ 12:00), Max: 99 (10-04-22 @ 12:00)  HR: 71 (10-04-22 @ 17:03) (55 - 82)  BP: --  RR:  (11 - 23)  SpO2:  (98% - 100%)  Wt(kg): --  GENERAL: Female laying in bed in NAD.  HEENT: Intubated. NGT with TFs on at 60cc/hr  Chest: Sterna incision healed  Abdomen: Soft, nontender, non distended  Extremities: Warm, venodynes on.   NEURO: Alert but unable to participate in encounter    LABS:  POCT Blood Glucose.: 121 mg/dL (10-04-22 @ 16:59)  POCT Blood Glucose.: 116 mg/dL (10-04-22 @ 16:06)  POCT Blood Glucose.: 103 mg/dL (10-04-22 @ 15:09)  POCT Blood Glucose.: 134 mg/dL (10-04-22 @ 13:00)  POCT Blood Glucose.: 158 mg/dL (10-04-22 @ 12:02)  POCT Blood Glucose.: 159 mg/dL (10-04-22 @ 11:22)  POCT Blood Glucose.: 136 mg/dL (10-04-22 @ 10:00)  POCT Blood Glucose.: 142 mg/dL (10-04-22 @ 09:04)  POCT Blood Glucose.: 181 mg/dL (10-04-22 @ 07:59)  POCT Blood Glucose.: 170 mg/dL (10-04-22 @ 06:55)  POCT Blood Glucose.: 177 mg/dL (10-04-22 @ 06:10)  POCT Blood Glucose.: 204 mg/dL (10-04-22 @ 04:59)  POCT Blood Glucose.: 216 mg/dL (10-04-22 @ 04:12)  POCT Blood Glucose.: 224 mg/dL (10-04-22 @ 02:54)  POCT Blood Glucose.: 255 mg/dL (10-04-22 @ 01:56)  POCT Blood Glucose.: 246 mg/dL (10-04-22 @ 01:08)  POCT Blood Glucose.: 199 mg/dL (10-03-22 @ 23:37)  POCT Blood Glucose.: 75 mg/dL (10-03-22 @ 23:15)  POCT Blood Glucose.: 78 mg/dL (10-03-22 @ 23:00)  POCT Blood Glucose.: 114 mg/dL (10-03-22 @ 21:55)  POCT Blood Glucose.: 202 mg/dL (10-03-22 @ 21:03)  POCT Blood Glucose.: 193 mg/dL (10-03-22 @ 19:47)  POCT Blood Glucose.: 210 mg/dL (10-03-22 @ 18:50)  POCT Blood Glucose.: 224 mg/dL (10-03-22 @ 18:23)  POCT Blood Glucose.: 215 mg/dL (10-03-22 @ 17:07)  POCT Blood Glucose.: 149 mg/dL (10-03-22 @ 16:06)  POCT Blood Glucose.: 108 mg/dL (10-03-22 @ 15:17)  POCT Blood Glucose.: 98 mg/dL (10-03-22 @ 14:28)  POCT Blood Glucose.: 118 mg/dL (10-03-22 @ 13:33)  POCT Blood Glucose.: 170 mg/dL (10-03-22 @ 12:32)  POCT Blood Glucose.: 150 mg/dL (10-03-22 @ 11:15)  POCT Blood Glucose.: 153 mg/dL (10-03-22 @ 10:28)  POCT Blood Glucose.: 153 mg/dL (10-03-22 @ 08:56)  POCT Blood Glucose.: 140 mg/dL (10-03-22 @ 08:12)  POCT Blood Glucose.: 103 mg/dL (10-03-22 @ 06:45)  POCT Blood Glucose.: 122 mg/dL (10-03-22 @ 05:56)  POCT Blood Glucose.: 119 mg/dL (10-03-22 @ 05:14)  POCT Blood Glucose.: 148 mg/dL (10-03-22 @ 04:23)  POCT Blood Glucose.: 153 mg/dL (10-03-22 @ 03:13)  POCT Blood Glucose.: 150 mg/dL (10-03-22 @ 01:55)  POCT Blood Glucose.: 193 mg/dL (10-03-22 @ 00:52)  POCT Blood Glucose.: 210 mg/dL (10-03-22 @ 00:16)                        8.6    20.03 )-----------( 147      ( 04 Oct 2022 00:31 )             28.7       10-04    150<H>  |  118<H>  |  31<H>  ----------------------------<  199<H>  4.7   |  24  |  0.48<L>    eGFR: 99    Ca    8.2<L>      10-04  Mg     2.4     10-04  Phos  2.9     10-04    TPro  5.3<L>  /  Alb  2.9<L>  /  TBili  0.8  /  DBili  x   /  AST  37  /  ALT  319<H>  /  AlkPhos  204<H>  10-04    Thyroid Function Tests:  10-03 @ 04:47 TSH 0.32 FreeT4 -- T3 -- Anti TPO -- Anti Thyroglobulin Ab -- TSI --  09-06 @ 16:46 TSH 3.30 FreeT4 -- T3 -- Anti TPO -- Anti Thyroglobulin Ab -- TSI --      A1C with Estimated Average Glucose Result: 9.4 % (09-06-22 @ 16:46)  A1C with Estimated Average Glucose Result: 9.2 % (07-11-22 @ 07:36)      Estimated Average Glucose: 223 mg/dL (09-06-22 @ 16:46)  Estimated Average Glucose: 217 mg/dL (07-11-22 @ 07:36)

## 2022-10-04 NOTE — PROGRESS NOTE ADULT - PROBLEM SELECTOR PLAN 2
On chronic steroids at home: medrol 8mg qd   titrated by CT team to HCT 50mg q8h on  9/29 and 100mg q8h on 9/30 and back to 50mg q8h today  Since pt is now more stable and off pressors consider start slow taper back to oral Prednisone chronic dose.  suggested taper   TBD daily depending on pts hemodynamic stability-  day 1 HCT 50mg q8h x 24hour   day 2 HCT 25 mg q8h x24 hours   day 3 HCT 20 mg at 8am, HCT 10mg  at 1600  day 4 HCT 10mg at 8 am, HCT 10mg at 1600  day 5 Prednisone 8mg qd  Will fo..ow up pt status and taper

## 2022-10-04 NOTE — PROGRESS NOTE ADULT - SUBJECTIVE AND OBJECTIVE BOX
CRITICAL CARE ATTENDING - CTICU    MEDICATIONS  (STANDING):  albuterol/ipratropium for Nebulization 3 milliLiter(s) Nebulizer every 6 hours  buDESOnide    Inhalation Suspension 0.5 milliGRAM(s) Inhalation every 12 hours  chlorhexidine 0.12% Liquid 15 milliLiter(s) Oral Mucosa every 12 hours  chlorhexidine 2% Cloths 1 Application(s) Topical <User Schedule>  collagenase Ointment 1 Application(s) Topical daily  dexMEDEtomidine Infusion 0.5 MICROgram(s)/kG/Hr (8.38 mL/Hr) IV Continuous <Continuous>  digoxin  Injectable 125 MICROGram(s) IV Push daily  enoxaparin Injectable 40 milliGRAM(s) SubCutaneous every 24 hours  hydrocortisone sodium succinate Injectable 50 milliGRAM(s) IV Push every 8 hours  insulin regular Infusion 3 Unit(s)/Hr (3 mL/Hr) IV Continuous <Continuous>  mexiletine 200 milliGRAM(s) Oral every 8 hours  milrinone Infusion 0.2 MICROgram(s)/kG/Min (4.02 mL/Hr) IV Continuous <Continuous>  multivitamin 1 Tablet(s) Oral daily  nystatin    Suspension 749397 Unit(s) Oral every 6 hours  pantoprazole  Injectable 40 milliGRAM(s) IV Push daily  propofol Infusion 12.438 MICROgram(s)/kG/Min (5 mL/Hr) IV Continuous <Continuous>  sodium chloride 0.9%. 1000 milliLiter(s) (50 mL/Hr) IV Continuous <Continuous>  vancomycin  IVPB 1000 milliGRAM(s) IV Intermittent every 12 hours  vancomycin  IVPB      vasopressin Infusion 0.04 Unit(s)/Min (6 mL/Hr) IV Continuous <Continuous>                                    8.6    20.03 )-----------( 147      ( 04 Oct 2022 00:31 )             28.7       10-04    150<H>  |  118<H>  |  31<H>  ----------------------------<  199<H>  4.7   |  24  |  0.48<L>    Ca    8.2<L>      04 Oct 2022 00:31  Phos  2.9     10-04  Mg     2.4     10-04    TPro  5.3<L>  /  Alb  2.9<L>  /  TBili  0.8  /  DBili  x   /  AST  37  /  ALT  319<H>  /  AlkPhos  204<H>  10-04          Mode: CPAP with PS  FiO2: 40  PEEP: 5  PS: 10  MAP: 8  PIP: 16      Daily     Daily Weight in k.8 (04 Oct 2022 00:00)      10-02 @ 07:  -  10-03 @ 07:00  --------------------------------------------------------  IN: 3751.8 mL / OUT: 3370 mL / NET: 381.8 mL    10-03 @ 07:01  -  10-04 @ 06:56  --------------------------------------------------------  IN: 3368.3 mL / OUT: 1745 mL / NET: 1623.3 mL        Critically Ill patient  : [ ] preoperative ,   [x ] post operative    Requires :  [x ] Arterial Line   [x ] Central Line  [ ] PA catheter  [ ] IABP  [ ] ECMO  [ ] LVAD  [ x] Ventilator  [x] Mar [ ] pacemaker [ ] Impella.                      [x ] ABG's     [ x] Pulse Oxymetry Monitoring  Bedside evaluation , monitoring , treatment of hemodynamics , fluids , IVP/ IVCD meds.        Diagnosis:      - Re Admit to CTICU - Septic Shock      Type A dissection repair, Modified bentall and hemiarch     Hypovolemia      Respiratory Failure       Requires chest PT, pulmonary toilet, ambu bagging, suctioning to maintain SaO2,  patent airway and treat atelectasis.     Difficult weaning process - multiple organ system involvement in critically ill patient     Ventilator Management:  [x]AC-rest -   PC    [ x]CPAP-PS Wean ?   [ ]Trach Collar     [ ]Extubate    [ ] T-Piece  [x ]peep>5    Hemodynamic lability,  instability. Requires IVCD [x] vasopressors [x ]  inotropes  [ ] vasodilator  [x ]IVSS fluid  to maintain MAP, perfusion, C.I.      CHF- acute [ x]   chronic [ ]    systolic [x ]   diastolic [ ]  Valvular [ ]          - Echo- EF -  40's           [ x] RV dysfunction          - Cxr-cardiomegally, edema          - Clinical-  [ x]inotropes   [x ]pressors   [ ]diuresis   [ ]IABP   [ ]ECMO   [ ]LVAD   [x]Respiratory Insufficiency     DM- IVCD Insulin      IVCD precedex and Propofol     Lactic Acidosis    Metabolic Acidosis    Hypernatremia     Hypocalcemia      COPD     Colostomy - colon CA     Renal Failure - Acute Kidney Injury     Fluid  overload     ? multifocal PNA ?    Requires bedside physical therapy, mobilization and total [facility] care         I, Bon Jiménez, personally performed the services described in this documentation. All medical record entries made by the scribe were at my direction and in my presence. I have reviewed the chart and agree that the record reflects my personal performance and is accurate and complete.   Bon Jiménez MD.       By signing my name below, I, Megan Guerra, attest that this documentation has been prepared under the direction and in the presence of Bon Jiménez MD.   Electronically Signed: Megan Guerra Scribe 10-04-22 @ 06:56        Discussed with CT surgeon, Physician Assistant - Nurse Practitioner- Critical care medicine team.   Dicussed at  AM / PM rounds.   Chart, labs , films reviewed.    Cumulative Critical Care Time Given Today:  CRITICAL CARE ATTENDING - CTICU    MEDICATIONS  (STANDING):  albuterol/ipratropium for Nebulization 3 milliLiter(s) Nebulizer every 6 hours  buDESOnide    Inhalation Suspension 0.5 milliGRAM(s) Inhalation every 12 hours  chlorhexidine 0.12% Liquid 15 milliLiter(s) Oral Mucosa every 12 hours  chlorhexidine 2% Cloths 1 Application(s) Topical <User Schedule>  collagenase Ointment 1 Application(s) Topical daily  dexMEDEtomidine Infusion 0.5 MICROgram(s)/kG/Hr (8.38 mL/Hr) IV Continuous <Continuous>  digoxin  Injectable 125 MICROGram(s) IV Push daily  enoxaparin Injectable 40 milliGRAM(s) SubCutaneous every 24 hours  hydrocortisone sodium succinate Injectable 50 milliGRAM(s) IV Push every 8 hours  insulin regular Infusion 3 Unit(s)/Hr (3 mL/Hr) IV Continuous <Continuous>  mexiletine 200 milliGRAM(s) Oral every 8 hours  milrinone Infusion 0.2 MICROgram(s)/kG/Min (4.02 mL/Hr) IV Continuous <Continuous>  multivitamin 1 Tablet(s) Oral daily  nystatin    Suspension 021945 Unit(s) Oral every 6 hours  pantoprazole  Injectable 40 milliGRAM(s) IV Push daily  propofol Infusion 12.438 MICROgram(s)/kG/Min (5 mL/Hr) IV Continuous <Continuous>  sodium chloride 0.9%. 1000 milliLiter(s) (50 mL/Hr) IV Continuous <Continuous>  vancomycin  IVPB 1000 milliGRAM(s) IV Intermittent every 12 hours  vancomycin  IVPB      vasopressin Infusion 0.04 Unit(s)/Min (6 mL/Hr) IV Continuous <Continuous>                                    8.6    20.03 )-----------( 147      ( 04 Oct 2022 00:31 )             28.7       10-04    150<H>  |  118<H>  |  31<H>  ----------------------------<  199<H>  4.7   |  24  |  0.48<L>    Ca    8.2<L>      04 Oct 2022 00:31  Phos  2.9     10-04  Mg     2.4     10-04    TPro  5.3<L>  /  Alb  2.9<L>  /  TBili  0.8  /  DBili  x   /  AST  37  /  ALT  319<H>  /  AlkPhos  204<H>  10-04          Mode: CPAP with PS  FiO2: 40  PEEP: 5  PS: 10  MAP: 8  PIP: 16      Daily     Daily Weight in k.8 (04 Oct 2022 00:00)      10-02 @ 07:  -  10-03 @ 07:00  --------------------------------------------------------  IN: 3751.8 mL / OUT: 3370 mL / NET: 381.8 mL    10-03 @ 07:01  -  10-04 @ 06:56  --------------------------------------------------------  IN: 3368.3 mL / OUT: 1745 mL / NET: 1623.3 mL        Critically Ill patient  : [ ] preoperative ,   [x ] post operative    Requires :  [x ] Arterial Line   [x ] Central Line  [ ] PA catheter  [ ] IABP  [ ] ECMO  [ ] LVAD  [ x] Ventilator  [x] Mar [ ] pacemaker [ ] Impella.                      [x ] ABG's     [ x] Pulse Oxymetry Monitoring  Bedside evaluation , monitoring , treatment of hemodynamics , fluids , IVP/ IVCD meds.        Diagnosis:      - Re Admit to CTICU - Septic Shock      Type A dissection repair, Modified bentall and hemiarch     Hypovolemia      Respiratory Failure       Requires chest PT, pulmonary toilet, ambu bagging, suctioning to maintain SaO2,  patent airway and treat atelectasis.     Difficult weaning process - multiple organ system involvement in critically ill patient     Ventilator Management:  [x]AC-rest -   PC    [ x]CPAP-PS Wean ?   [ ]Trach Collar     [ ]Extubate    [ ] T-Piece  [x ]peep>5    Hemodynamic lability,  instability. Requires IVCD [x] vasopressors [x ]  inotropes  [ ] vasodilator  [x ]IVSS fluid  to maintain MAP, perfusion, C.I.      CHF- acute [ x]   chronic [ ]    systolic [x ]   diastolic [ ]  Valvular [ ]          - Echo- EF -  40's           [ x] RV dysfunction          - Cxr-cardiomegally, edema          - Clinical-  [ x]inotropes   [x ]pressors   [ ]diuresis   [ ]IABP   [ ]ECMO   [ ]LVAD   [x]Respiratory Insufficiency     DM- IVCD Insulin      IVCD precedex and Propofol     Lactic Acidosis    Metabolic Acidosis    Hypernatremia     Hypocalcemia      COPD     Colostomy - colon CA     Renal Failure - Acute Kidney Injury     Fluid  overload     ? multifocal PNA ?    Requires bedside physical therapy, mobilization and total [facility] care         I, Bon Jiménez, personally performed the services described in this documentation. All medical record entries made by the scribe were at my direction and in my presence. I have reviewed the chart and agree that the record reflects my personal performance and is accurate and complete.   Bon Jiménez MD.       By signing my name below, I, Megan Guerra, attest that this documentation has been prepared under the direction and in the presence of Bon Jiménez MD.   Electronically Signed: Megan Guerra Scribe 10-04-22 @ 06:56        Discussed with CT surgeon, Physician Assistant - Nurse Practitioner- Critical care medicine team.   Dicussed at  AM / PM rounds.   Chart, labs , films reviewed.    Cumulative Critical Care Time Given Today:  35 min

## 2022-10-04 NOTE — PROGRESS NOTE ADULT - PROBLEM SELECTOR PLAN 1
-BG Goal 100-180mg/dl   -test BG hourly while on insulin gtt. Once off gtt can check q6h if NPO/TF and AC/HS/2AM daily once eating again  -C/w Insulin drip to BG goal 100 to 180s. if hyperglycemic Would consider stating 3-4 units which is average dose needed instead of starting 5 to 7 units which can place pt at risk for hypoglycemia.   -Discontinue TFs/PO diet orderwhile pt is npo. order  Still active  -Contact endo team once pt is more stable to restart SQ insulin again  Discharge plan:  - Likely to discharge patient home on basal/bolus insulin. Final regimen pending clinical course.  - Recommend routine outpatient ophthalmology, podiatry  - Can f/u with endocrinologist Dr. Saavedra.  - Make sure pt has Rx for all DM supplies and insulin/ DM meds.  -Based on pt's clinical condition and mental status at this time she is not able to independently manage her DM. Will evaluate pt's ability to manage DM at time of discharge. If unable> pt will need family/ care giver (s) to manage DM care at home  -Will need rehab.

## 2022-10-04 NOTE — PROGRESS NOTE ADULT - NSPROGADDITIONALINFOA_GEN_ALL_CORE
-Plan discussed with pt/team.  Contact info: [phone] (24/7). pager [phone]  [email or web address] password [email or web address]  Teams  Spent  28 minutes assessing pt/labs/meds and discussing plan of care with primary team  Adjusting insulin  Discharge plan  Follow up care

## 2022-10-05 PROBLEM — S91.301A WOUND OF RIGHT FOOT: Status: ACTIVE | Noted: 2022-10-05

## 2022-10-05 LAB
ALBUMIN SERPL ELPH-MCNC: 3.2 G/DL — LOW (ref 3.3–5)
ALP SERPL-CCNC: 210 U/L — HIGH (ref 40–120)
ALT FLD-CCNC: 221 U/L — HIGH (ref 10–45)
ANION GAP SERPL CALC-SCNC: 9 MMOL/L — SIGNIFICANT CHANGE UP (ref 5–17)
APTT BLD: 27.1 SEC — LOW (ref 27.5–35.5)
AST SERPL-CCNC: 52 U/L — HIGH (ref 10–40)
BILIRUB SERPL-MCNC: 0.8 MG/DL — SIGNIFICANT CHANGE UP (ref 0.2–1.2)
BUN SERPL-MCNC: 23 MG/DL — SIGNIFICANT CHANGE UP (ref 7–23)
CALCIUM SERPL-MCNC: 8.1 MG/DL — LOW (ref 8.4–10.5)
CHLORIDE SERPL-SCNC: 115 MMOL/L — HIGH (ref 96–108)
CO2 SERPL-SCNC: 25 MMOL/L — SIGNIFICANT CHANGE UP (ref 22–31)
CREAT SERPL-MCNC: 0.43 MG/DL — LOW (ref 0.5–1.3)
CULTURE RESULTS: SIGNIFICANT CHANGE UP
EGFR: 102 ML/MIN/1.73M2 — SIGNIFICANT CHANGE UP
GAS PNL BLDA: SIGNIFICANT CHANGE UP
GLUCOSE BLDC GLUCOMTR-MCNC: 108 MG/DL — HIGH (ref 70–99)
GLUCOSE BLDC GLUCOMTR-MCNC: 112 MG/DL — HIGH (ref 70–99)
GLUCOSE BLDC GLUCOMTR-MCNC: 119 MG/DL — HIGH (ref 70–99)
GLUCOSE BLDC GLUCOMTR-MCNC: 123 MG/DL — HIGH (ref 70–99)
GLUCOSE BLDC GLUCOMTR-MCNC: 130 MG/DL — HIGH (ref 70–99)
GLUCOSE BLDC GLUCOMTR-MCNC: 134 MG/DL — HIGH (ref 70–99)
GLUCOSE BLDC GLUCOMTR-MCNC: 137 MG/DL — HIGH (ref 70–99)
GLUCOSE BLDC GLUCOMTR-MCNC: 140 MG/DL — HIGH (ref 70–99)
GLUCOSE BLDC GLUCOMTR-MCNC: 140 MG/DL — HIGH (ref 70–99)
GLUCOSE BLDC GLUCOMTR-MCNC: 141 MG/DL — HIGH (ref 70–99)
GLUCOSE BLDC GLUCOMTR-MCNC: 143 MG/DL — HIGH (ref 70–99)
GLUCOSE BLDC GLUCOMTR-MCNC: 146 MG/DL — HIGH (ref 70–99)
GLUCOSE BLDC GLUCOMTR-MCNC: 147 MG/DL — HIGH (ref 70–99)
GLUCOSE BLDC GLUCOMTR-MCNC: 150 MG/DL — HIGH (ref 70–99)
GLUCOSE BLDC GLUCOMTR-MCNC: 150 MG/DL — HIGH (ref 70–99)
GLUCOSE BLDC GLUCOMTR-MCNC: 156 MG/DL — HIGH (ref 70–99)
GLUCOSE BLDC GLUCOMTR-MCNC: 163 MG/DL — HIGH (ref 70–99)
GLUCOSE BLDC GLUCOMTR-MCNC: 163 MG/DL — HIGH (ref 70–99)
GLUCOSE BLDC GLUCOMTR-MCNC: 166 MG/DL — HIGH (ref 70–99)
GLUCOSE BLDC GLUCOMTR-MCNC: 168 MG/DL — HIGH (ref 70–99)
GLUCOSE BLDC GLUCOMTR-MCNC: 174 MG/DL — HIGH (ref 70–99)
GLUCOSE BLDC GLUCOMTR-MCNC: 178 MG/DL — HIGH (ref 70–99)
GLUCOSE BLDC GLUCOMTR-MCNC: 99 MG/DL — SIGNIFICANT CHANGE UP (ref 70–99)
GLUCOSE SERPL-MCNC: 146 MG/DL — HIGH (ref 70–99)
HCT VFR BLD CALC: 28.9 % — LOW (ref 34.5–45)
HGB BLD-MCNC: 8.8 G/DL — LOW (ref 11.5–15.5)
MAGNESIUM SERPL-MCNC: 1.8 MG/DL — SIGNIFICANT CHANGE UP (ref 1.6–2.6)
MCHC RBC-ENTMCNC: 24.9 PG — LOW (ref 27–34)
MCHC RBC-ENTMCNC: 30.4 GM/DL — LOW (ref 32–36)
MCV RBC AUTO: 81.6 FL — SIGNIFICANT CHANGE UP (ref 80–100)
NRBC # BLD: 6 /100 WBCS — HIGH (ref 0–0)
PHOSPHATE SERPL-MCNC: 1.5 MG/DL — LOW (ref 2.5–4.5)
PLATELET # BLD AUTO: 171 K/UL — SIGNIFICANT CHANGE UP (ref 150–400)
POTASSIUM SERPL-MCNC: 3.6 MMOL/L — SIGNIFICANT CHANGE UP (ref 3.5–5.3)
POTASSIUM SERPL-SCNC: 3.6 MMOL/L — SIGNIFICANT CHANGE UP (ref 3.5–5.3)
PROT SERPL-MCNC: 5.5 G/DL — LOW (ref 6–8.3)
RBC # BLD: 3.54 M/UL — LOW (ref 3.8–5.2)
RBC # FLD: 27.5 % — HIGH (ref 10.3–14.5)
SODIUM SERPL-SCNC: 149 MMOL/L — HIGH (ref 135–145)
SPECIMEN SOURCE: SIGNIFICANT CHANGE UP
VANCOMYCIN TROUGH SERPL-MCNC: 13.2 UG/ML — SIGNIFICANT CHANGE UP (ref 10–20)
WBC # BLD: 25.66 K/UL — HIGH (ref 3.8–10.5)
WBC # FLD AUTO: 25.66 K/UL — HIGH (ref 3.8–10.5)

## 2022-10-05 PROCEDURE — 71045 X-RAY EXAM CHEST 1 VIEW: CPT | Mod: 26

## 2022-10-05 PROCEDURE — 99291 CRITICAL CARE FIRST HOUR: CPT

## 2022-10-05 PROCEDURE — 99232 SBSQ HOSP IP/OBS MODERATE 35: CPT

## 2022-10-05 PROCEDURE — 76705 ECHO EXAM OF ABDOMEN: CPT | Mod: 26

## 2022-10-05 PROCEDURE — 93970 EXTREMITY STUDY: CPT | Mod: 26

## 2022-10-05 RX ORDER — POTASSIUM CHLORIDE 20 MEQ
20 PACKET (EA) ORAL ONCE
Refills: 0 | Status: COMPLETED | OUTPATIENT
Start: 2022-10-05 | End: 2022-10-05

## 2022-10-05 RX ORDER — HYDROCORTISONE 20 MG
10 TABLET ORAL
Refills: 0 | Status: COMPLETED | OUTPATIENT
Start: 2022-10-06 | End: 2022-10-06

## 2022-10-05 RX ORDER — METOPROLOL TARTRATE 50 MG
25 TABLET ORAL
Refills: 0 | Status: DISCONTINUED | OUTPATIENT
Start: 2022-10-05 | End: 2022-10-10

## 2022-10-05 RX ORDER — FENTANYL CITRATE 50 UG/ML
50 INJECTION INTRAVENOUS ONCE
Refills: 0 | Status: DISCONTINUED | OUTPATIENT
Start: 2022-10-05 | End: 2022-10-05

## 2022-10-05 RX ORDER — POTASSIUM CHLORIDE 20 MEQ
10 PACKET (EA) ORAL
Refills: 0 | Status: COMPLETED | OUTPATIENT
Start: 2022-10-05 | End: 2022-10-05

## 2022-10-05 RX ORDER — MAGNESIUM SULFATE 500 MG/ML
2 VIAL (ML) INJECTION ONCE
Refills: 0 | Status: COMPLETED | OUTPATIENT
Start: 2022-10-05 | End: 2022-10-05

## 2022-10-05 RX ORDER — HYDROCORTISONE 20 MG
20 TABLET ORAL
Refills: 0 | Status: COMPLETED | OUTPATIENT
Start: 2022-10-05 | End: 2022-10-05

## 2022-10-05 RX ORDER — HYDROCORTISONE 20 MG
10 TABLET ORAL
Refills: 0 | Status: COMPLETED | OUTPATIENT
Start: 2022-10-07 | End: 2022-10-07

## 2022-10-05 RX ORDER — HEPARIN SODIUM 5000 [USP'U]/ML
600 INJECTION INTRAVENOUS; SUBCUTANEOUS
Qty: 25000 | Refills: 0 | Status: DISCONTINUED | OUTPATIENT
Start: 2022-10-05 | End: 2022-10-10

## 2022-10-05 RX ADMIN — ENOXAPARIN SODIUM 40 MILLIGRAM(S): 100 INJECTION SUBCUTANEOUS at 11:15

## 2022-10-05 RX ADMIN — MEXILETINE HYDROCHLORIDE 200 MILLIGRAM(S): 150 CAPSULE ORAL at 14:19

## 2022-10-05 RX ADMIN — Medication 250 MILLIGRAM(S): at 17:02

## 2022-10-05 RX ADMIN — Medication 50 MILLIEQUIVALENT(S): at 00:00

## 2022-10-05 RX ADMIN — Medication 3 MILLILITER(S): at 12:24

## 2022-10-05 RX ADMIN — PANTOPRAZOLE SODIUM 40 MILLIGRAM(S): 20 TABLET, DELAYED RELEASE ORAL at 11:14

## 2022-10-05 RX ADMIN — Medication 3 MILLILITER(S): at 06:24

## 2022-10-05 RX ADMIN — Medication 25 MILLIGRAM(S): at 17:03

## 2022-10-05 RX ADMIN — Medication 50 MILLIEQUIVALENT(S): at 02:37

## 2022-10-05 RX ADMIN — MEXILETINE HYDROCHLORIDE 200 MILLIGRAM(S): 150 CAPSULE ORAL at 22:41

## 2022-10-05 RX ADMIN — Medication 25 MILLIGRAM(S): at 06:18

## 2022-10-05 RX ADMIN — Medication 25 GRAM(S): at 03:12

## 2022-10-05 RX ADMIN — Medication 3 MILLILITER(S): at 18:17

## 2022-10-05 RX ADMIN — Medication 3 MILLILITER(S): at 00:08

## 2022-10-05 RX ADMIN — Medication 25 MILLIGRAM(S): at 11:24

## 2022-10-05 RX ADMIN — FENTANYL CITRATE 50 MICROGRAM(S): 50 INJECTION INTRAVENOUS at 06:59

## 2022-10-05 RX ADMIN — Medication 85 MILLIMOLE(S): at 03:12

## 2022-10-05 RX ADMIN — CASPOFUNGIN ACETATE 260 MILLIGRAM(S): 7 INJECTION, POWDER, LYOPHILIZED, FOR SOLUTION INTRAVENOUS at 07:48

## 2022-10-05 RX ADMIN — Medication 20 MILLIEQUIVALENT(S): at 10:42

## 2022-10-05 RX ADMIN — Medication 0.5 MILLIGRAM(S): at 06:24

## 2022-10-05 RX ADMIN — CHLORHEXIDINE GLUCONATE 1 APPLICATION(S): 213 SOLUTION TOPICAL at 06:51

## 2022-10-05 RX ADMIN — CHLORHEXIDINE GLUCONATE 15 MILLILITER(S): 213 SOLUTION TOPICAL at 06:51

## 2022-10-05 RX ADMIN — Medication 1 TABLET(S): at 11:15

## 2022-10-05 RX ADMIN — Medication 250 MILLIGRAM(S): at 06:07

## 2022-10-05 RX ADMIN — FENTANYL CITRATE 50 MICROGRAM(S): 50 INJECTION INTRAVENOUS at 06:10

## 2022-10-05 RX ADMIN — MEXILETINE HYDROCHLORIDE 200 MILLIGRAM(S): 150 CAPSULE ORAL at 06:17

## 2022-10-05 RX ADMIN — Medication 50 MILLIEQUIVALENT(S): at 03:12

## 2022-10-05 RX ADMIN — CHLORHEXIDINE GLUCONATE 15 MILLILITER(S): 213 SOLUTION TOPICAL at 17:02

## 2022-10-05 RX ADMIN — Medication 125 MICROGRAM(S): at 11:14

## 2022-10-05 RX ADMIN — Medication 20 MILLIGRAM(S): at 15:54

## 2022-10-05 RX ADMIN — Medication 0.5 MILLIGRAM(S): at 18:17

## 2022-10-05 RX ADMIN — Medication 1 APPLICATION(S): at 11:15

## 2022-10-05 NOTE — PROGRESS NOTE ADULT - SUBJECTIVE AND OBJECTIVE BOX
DIABETES FOLLOW UP NOTE: Saw pt earlier today    Chief Complaint: Endocrine consult requested for management of T2DM    INTERVAL HX: Saw pt in CTU, intubated, remains off pressors and on TFs of Glucerna  decreased to 55 cc/hr. BG mostly at goal while on insulin drip requiring average of 2-4 units/hr. No hypoglycemia. On antibiotic for sepsis with + transaminitis. Tapering HCT dose Pt awake but able to nod yes to feeling better and no to having any pain/no to questions.         Review of Systems:  General: Limited. As above      Allergies    aspirin (Short breath)  Avelox (Short breath; Pruritus)  cefepime (Anaphylaxis)  codeine (Short breath)  Dilaudid (Short breath)  iodine (Short breath; Swelling)  penicillin (Anaphylaxis)  shellfish (Anaphylaxis)  tetanus toxoid (Short breath)  Valium (Short breath)    Intolerances      MEDICATIONS:  caspofungin IVPB 50 milliGRAM(s) IV Intermittent every 24 hours  insulin regular Infusion 3 Unit(s)/Hr (3 mL/Hr) IV Continuous <Continuous>  vancomycin  IVPB 1000 milliGRAM(s) IV Intermittent every 12 hours        PHYSICAL EXAM:  VITALS: T(C): 38.1 (10-05-22 @ 16:00)  T(F): 100.6 (10-05-22 @ 16:00), Max: 100.6 (10-05-22 @ 16:00)  HR: 92 (10-05-22 @ 17:00) (61 - 93)  BP: 119/58 (10-05-22 @ 08:15) (119/58 - 119/58)  RR:  (10 - 40)  SpO2:  (95% - 100%)  Wt(kg): --  GENERAL: Female laying in bed in NAD.  HEENT: Intubated. NGT with TFs on at 55cc/hr  Chest: Sterna incision healed  Abdomen: Soft, nontender, non distended  Extremities: Warm, venodynes on.   NEURO: Alert limited interaction during encounter. Able to nod to some questions    LABS:  POCT Blood Glucose.: 123 mg/dL (10-05-22 @ 17:59)  POCT Blood Glucose.: 112 mg/dL (10-05-22 @ 17:01)  POCT Blood Glucose.: 178 mg/dL (10-05-22 @ 15:51)  POCT Blood Glucose.: 168 mg/dL (10-05-22 @ 14:59)  POCT Blood Glucose.: 146 mg/dL (10-05-22 @ 14:18)  POCT Blood Glucose.: 99 mg/dL (10-05-22 @ 12:54)  POCT Blood Glucose.: 108 mg/dL (10-05-22 @ 12:10)  POCT Blood Glucose.: 119 mg/dL (10-05-22 @ 11:14)  POCT Blood Glucose.: 141 mg/dL (10-05-22 @ 10:01)  POCT Blood Glucose.: 163 mg/dL (10-05-22 @ 08:50)  POCT Blood Glucose.: 150 mg/dL (10-05-22 @ 08:01)  POCT Blood Glucose.: 143 mg/dL (10-05-22 @ 06:45)  POCT Blood Glucose.: 134 mg/dL (10-05-22 @ 06:12)  POCT Blood Glucose.: 141 mg/dL (10-05-22 @ 03:10)  POCT Blood Glucose.: 137 mg/dL (10-05-22 @ 01:02)  POCT Blood Glucose.: 140 mg/dL (10-05-22 @ 00:31)  POCT Blood Glucose.: 142 mg/dL (10-04-22 @ 23:02)  POCT Blood Glucose.: 150 mg/dL (10-04-22 @ 21:50)  POCT Blood Glucose.: 174 mg/dL (10-04-22 @ 21:28)  POCT Blood Glucose.: 147 mg/dL (10-04-22 @ 19:58)  POCT Blood Glucose.: 139 mg/dL (10-04-22 @ 18:11)  POCT Blood Glucose.: 121 mg/dL (10-04-22 @ 16:59)  POCT Blood Glucose.: 116 mg/dL (10-04-22 @ 16:06)  POCT Blood Glucose.: 103 mg/dL (10-04-22 @ 15:09)  POCT Blood Glucose.: 134 mg/dL (10-04-22 @ 13:00)  POCT Blood Glucose.: 158 mg/dL (10-04-22 @ 12:02)  POCT Blood Glucose.: 159 mg/dL (10-04-22 @ 11:22)  POCT Blood Glucose.: 136 mg/dL (10-04-22 @ 10:00)  POCT Blood Glucose.: 142 mg/dL (10-04-22 @ 09:04)  POCT Blood Glucose.: 181 mg/dL (10-04-22 @ 07:59)  POCT Blood Glucose.: 170 mg/dL (10-04-22 @ 06:55)  POCT Blood Glucose.: 177 mg/dL (10-04-22 @ 06:10)  POCT Blood Glucose.: 204 mg/dL (10-04-22 @ 04:59)  POCT Blood Glucose.: 216 mg/dL (10-04-22 @ 04:12)  POCT Blood Glucose.: 224 mg/dL (10-04-22 @ 02:54)  POCT Blood Glucose.: 255 mg/dL (10-04-22 @ 01:56)  POCT Blood Glucose.: 246 mg/dL (10-04-22 @ 01:08)                          8.8    25.66 )-----------( 171      ( 05 Oct 2022 01:11 )             28.9       10-05    149<H>  |  115<H>  |  23  ----------------------------<  146<H>  3.6   |  25  |  0.43<L>    eGFR: 102    Ca    8.1<L>      10-05  Mg     1.8     10-05  Phos  1.5     10-05    TPro  5.5<L>  /  Alb  3.2<L>  /  TBili  0.8  /  DBili  x   /  AST  52<H>  /  ALT  221<H>  /  AlkPhos  210<H>  10-05      Thyroid Function Tests:  10-03 @ 04:47 TSH 0.32 FreeT4 -- T3 -- Anti TPO -- Anti Thyroglobulin Ab -- TSI --  09-06 @ 16:46 TSH 3.30 FreeT4 -- T3 -- Anti TPO -- Anti Thyroglobulin Ab -- TSI --      A1C with Estimated Average Glucose Result: 9.4 % (09-06-22 @ 16:46)  A1C with Estimated Average Glucose Result: 9.2 % (07-11-22 @ 07:36)      Estimated Average Glucose: 223 mg/dL (09-06-22 @ 16:46)  Estimated Average Glucose: 217 mg/dL (07-11-22 @ 07:36)

## 2022-10-05 NOTE — PHYSICAL EXAM
[No Acute Distress] : no acute distress [Well Nourished] : well nourished [Well Developed] : well developed [Well-Appearing] : well-appearing [de-identified] : R foot just inspected, dressed yesterday.  in boot

## 2022-10-05 NOTE — CONSULT NOTE ADULT - SUBJECTIVE AND OBJECTIVE BOX
[facility] DEPARTMENT OF OPHTHALMOLOGY - INITIAL ADULT CONSULT  -----------------------------------------------------------------------------------------------------------------  Mark Field MD PGY 3  [phone]  -----------------------------------------------------------------------------------------------------------------    HPI: [id number] with history of DM, COPD, chronic adrenal insufficiency, colorectal cancer s/p resection, prosthetic left eye, currently admitted for type A aortic dissection. Patient intubated and sedated, unable to obtain history.    PAST MEDICAL & SURGICAL HISTORY:  Atrial fibrillation  paroxysmal, on eliquis      Diabetes  Type 2      COPD (chronic obstructive pulmonary disease)      Adrenal insufficiency  Medrol daily for over 50 years      Aortic insufficiency  moderate AR on echo 5/3/2018      Pelvic fracture      Asthma      Tracheobronchomalacia  diagnosed 2015, s/p bronchial thermoplasty 2016 (Dr Zapien); recent bronchoscopy 6/5/2018 revealed no evidence of tracheobronchomalacia in trachea or bronchial tubes      Colorectal cancer  4/2018- last treatment , chemo and radiation      Rectal bleeding      Seizure  x 1 1/7/18      DVT (deep venous thrombosis)  15-20 years ago, took coumadin      TIA (transient ischemic attack)  multiple, last 5 years ago - presents as right-sided weakness      History of partial hysterectomy  30 years ago - fibroids      H/O total knee replacement, bilateral  5 years ago      History of sinus surgery  multiple sinus surgeries      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      H/O pelvic surgery  5 years ago - s/p fracture      History of tracheomalacia  2015 - attempted tracheal stenting ([facility])- course complicated by obstruction, respiratory failure, multiple CPR attempts -  stent discontinued; 10/20/2016 Tracheobronchoplasty (Prolene Mesh) performed at [facility] by Dr Zapien      S/P bronchoscopy  6/5/2018 - [place] (Dr Zapien) no evidence of tracheobronchomalacia in trachea or bronchial tubes      Rectal bleeding  exam under anesthesia ([facility]) 2/2018        Past Ocular History: left prosthetic  Ophthalmic Medications: none  FAMILY HISTORY:  Family history of asthma    Family history of breast cancer (Sibling)    Family history of diabetes mellitus type II      Social History: denies any complaints.    MEDICATIONS  (STANDING):  albuterol/ipratropium for Nebulization 3 milliLiter(s) Nebulizer every 6 hours  buDESOnide    Inhalation Suspension 0.5 milliGRAM(s) Inhalation every 12 hours  caspofungin IVPB 50 milliGRAM(s) IV Intermittent every 24 hours  chlorhexidine 0.12% Liquid 15 milliLiter(s) Oral Mucosa every 12 hours  chlorhexidine 2% Cloths 1 Application(s) Topical <User Schedule>  collagenase Ointment 1 Application(s) Topical daily  dexMEDEtomidine Infusion 0.5 MICROgram(s)/kG/Hr (8.38 mL/Hr) IV Continuous <Continuous>  digoxin  Injectable 125 MICROGram(s) IV Push daily  enoxaparin Injectable 40 milliGRAM(s) SubCutaneous every 24 hours  hydrocortisone sodium succinate Injectable 20 milliGRAM(s) IV Push <User Schedule>  insulin regular Infusion 3 Unit(s)/Hr (3 mL/Hr) IV Continuous <Continuous>  mexiletine 200 milliGRAM(s) Oral every 8 hours  multivitamin 1 Tablet(s) Oral daily  niCARdipine Infusion 5 mG/Hr (25 mL/Hr) IV Continuous <Continuous>  pantoprazole  Injectable 40 milliGRAM(s) IV Push daily  propofol Infusion 12.438 MICROgram(s)/kG/Min (5 mL/Hr) IV Continuous <Continuous>  sodium chloride 0.9%. 1000 milliLiter(s) (50 mL/Hr) IV Continuous <Continuous>  vancomycin  IVPB 1000 milliGRAM(s) IV Intermittent every 12 hours  vancomycin  IVPB        MEDICATIONS  (PRN):  simethicone drops 80 milliGRAM(s) Oral every 12 hours PRN Gas    Allergies & Intolerances:   aspirin (Short breath)  Avelox (Short breath; Pruritus)  cefepime (Anaphylaxis)  codeine (Short breath)  Dilaudid (Short breath)  iodine (Short breath; Swelling)  penicillin (Anaphylaxis)  shellfish (Anaphylaxis)  tetanus toxoid (Short breath)  Valium (Short breath)    Review of Systems:  Constitutional: No fever, chills  Eyes: No blurry vision, flashes, floaters, FBS, erythema, discharge, double vision, OU  Neuro: No tremors  Cardiovascular: No chest pain, palpitations  Respiratory: No SOB, no cough  GI: No nausea, vomiting, abdominal pain  : No dysuria  Skin: no rash  Psych: no depression  Endocrine: no polyuria, polydipsia  Heme/lymph: no swelling    VITALS: T(C): 37.4 (10-05-22 @ 07:00)  T(F): 99.3 (10-05-22 @ 07:00), Max: 100.6 (10-04-22 @ 17:00)  HR: 82 (10-05-22 @ 11:00) (60 - 93)  BP: 119/58 (10-05-22 @ 08:15) (119/58 - 119/58)  RR:  (10 - 40)  SpO2:  (96% - 100%)  Wt(kg): --  General: AAO x 3, appropriate mood and affect    Ophthalmology Exam:  Visual acuity (sc): unable due to mental status  Pupils: Round and reactive OD  Ttono: 23 OD  Extraocular movements (EOMs): Full OU  Confrontational Visual Field (CVF): unable due to mental status  Color Plates: unable due to mental status    Pen Light Exam (PLE)  External: Flat OD, prosthetic OS  Lids/Lashes/Lacrimal Ducts: Flat OD, prosthetic OS  Sclera/Conjunctiva: W+Q OD, prosthetic OS; no injection OD  Cornea: Cl OD, prosthetic OS  Anterior Chamber: D+F OD, prosthetic OS; no hypopyon OD  Iris: Flat OD, prosthetic OS  Lens: Cl OD, prosthetic OS    Fundus Exam: dilated with 1% tropicamide and 2.5% phenylephrine  Approval obtained from primary team for dilation  Patient aware that pupils can remained dilated for at least 4-6 hours  Exam performed with 20D lens    Vitreous: wnl OD, prosthetic OS  Disc, cup/disc: sharp and pink, 0.4 OD, prosthetic OS  Macula: wnl OD, prosthetic OS  Vessels: wnl OD, prosthetic OS  Periphery: wnl OD, prosthetic OS   [facility] DEPARTMENT OF OPHTHALMOLOGY - INITIAL ADULT CONSULT  -----------------------------------------------------------------------------------------------------------------  Mark Field MD PGY 3  [phone]  -----------------------------------------------------------------------------------------------------------------    HPI: 72F with history of DM, COPD, chronic adrenal insufficiency, colorectal cancer s/p resection, prosthetic left eye, currently admitted for type A aortic dissection. Patient intubated and sedated, unable to obtain history.    PAST MEDICAL & SURGICAL HISTORY:  Atrial fibrillation  paroxysmal, on eliquis      Diabetes  Type 2      COPD (chronic obstructive pulmonary disease)      Adrenal insufficiency  Medrol daily for over 50 years      Aortic insufficiency  moderate AR on echo 5/3/2018      Pelvic fracture      Asthma      Tracheobronchomalacia  diagnosed 2015, s/p bronchial thermoplasty 2016 (Dr Zapien); recent bronchoscopy 6/5/2018 revealed no evidence of tracheobronchomalacia in trachea or bronchial tubes      Colorectal cancer  4/2018- last treatment , chemo and radiation      Rectal bleeding      Seizure  x 1 1/7/18      DVT (deep venous thrombosis)  15-20 years ago, took coumadin      TIA (transient ischemic attack)  multiple, last 5 years ago - presents as right-sided weakness      History of partial hysterectomy  30 years ago - fibroids      H/O total knee replacement, bilateral  5 years ago      History of sinus surgery  multiple sinus surgeries      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      H/O pelvic surgery  5 years ago - s/p fracture      History of tracheomalacia  2015 - attempted tracheal stenting ([facility])- course complicated by obstruction, respiratory failure, multiple CPR attempts -  stent discontinued; 10/20/2016 Tracheobronchoplasty (Prolene Mesh) performed at [facility] by Dr Zapien      S/P bronchoscopy  6/5/2018 - [place] (Dr Zapien) no evidence of tracheobronchomalacia in trachea or bronchial tubes      Rectal bleeding  exam under anesthesia ([facility]) 2/2018        Past Ocular History: left prosthetic  Ophthalmic Medications: none  FAMILY HISTORY:  Family history of asthma    Family history of breast cancer (Sibling)    Family history of diabetes mellitus type II      Social History: denies any complaints.    MEDICATIONS  (STANDING):  albuterol/ipratropium for Nebulization 3 milliLiter(s) Nebulizer every 6 hours  buDESOnide    Inhalation Suspension 0.5 milliGRAM(s) Inhalation every 12 hours  caspofungin IVPB 50 milliGRAM(s) IV Intermittent every 24 hours  chlorhexidine 0.12% Liquid 15 milliLiter(s) Oral Mucosa every 12 hours  chlorhexidine 2% Cloths 1 Application(s) Topical <User Schedule>  collagenase Ointment 1 Application(s) Topical daily  dexMEDEtomidine Infusion 0.5 MICROgram(s)/kG/Hr (8.38 mL/Hr) IV Continuous <Continuous>  digoxin  Injectable 125 MICROGram(s) IV Push daily  enoxaparin Injectable 40 milliGRAM(s) SubCutaneous every 24 hours  hydrocortisone sodium succinate Injectable 20 milliGRAM(s) IV Push <User Schedule>  insulin regular Infusion 3 Unit(s)/Hr (3 mL/Hr) IV Continuous <Continuous>  mexiletine 200 milliGRAM(s) Oral every 8 hours  multivitamin 1 Tablet(s) Oral daily  niCARdipine Infusion 5 mG/Hr (25 mL/Hr) IV Continuous <Continuous>  pantoprazole  Injectable 40 milliGRAM(s) IV Push daily  propofol Infusion 12.438 MICROgram(s)/kG/Min (5 mL/Hr) IV Continuous <Continuous>  sodium chloride 0.9%. 1000 milliLiter(s) (50 mL/Hr) IV Continuous <Continuous>  vancomycin  IVPB 1000 milliGRAM(s) IV Intermittent every 12 hours  vancomycin  IVPB        MEDICATIONS  (PRN):  simethicone drops 80 milliGRAM(s) Oral every 12 hours PRN Gas    Allergies & Intolerances:   aspirin (Short breath)  Avelox (Short breath; Pruritus)  cefepime (Anaphylaxis)  codeine (Short breath)  Dilaudid (Short breath)  iodine (Short breath; Swelling)  penicillin (Anaphylaxis)  shellfish (Anaphylaxis)  tetanus toxoid (Short breath)  Valium (Short breath)    Review of Systems:  Constitutional: No fever, chills  Eyes: No blurry vision, flashes, floaters, FBS, erythema, discharge, double vision, OU  Neuro: No tremors  Cardiovascular: No chest pain, palpitations  Respiratory: No SOB, no cough  GI: No nausea, vomiting, abdominal pain  : No dysuria  Skin: no rash  Psych: no depression  Endocrine: no polyuria, polydipsia  Heme/lymph: no swelling    VITALS: T(C): 37.4 (10-05-22 @ 07:00)  T(F): 99.3 (10-05-22 @ 07:00), Max: 100.6 (10-04-22 @ 17:00)  HR: 82 (10-05-22 @ 11:00) (60 - 93)  BP: 119/58 (10-05-22 @ 08:15) (119/58 - 119/58)  RR:  (10 - 40)  SpO2:  (96% - 100%)  Wt(kg): --  General: AAO x 3, appropriate mood and affect    Ophthalmology Exam:  Visual acuity (sc): unable due to mental status  Pupils: Round and reactive OD  Ttono: 23 OD  Extraocular movements (EOMs): Full OU  Confrontational Visual Field (CVF): unable due to mental status  Color Plates: unable due to mental status    Pen Light Exam (PLE)  External: Flat OD, prosthetic OS  Lids/Lashes/Lacrimal Ducts: Flat OD, prosthetic OS  Sclera/Conjunctiva: W+Q without injection OD, prosthetic OS;  Cornea: Cl OD, prosthetic OS  Anterior Chamber: D+F without hypopyon OD, prosthetic OS;  Iris: Flat OD, prosthetic OS  Lens: PCIOL OD, prosthetic OS    Fundus Exam: dilated with 1% tropicamide and 2.5% phenylephrine  Approval obtained from primary team for dilation  Patient aware that pupils can remained dilated for at least 4-6 hours  Exam performed with 20D lens    Vitreous: wnl OD without vitritis, prosthetic OS  Disc, cup/disc: sharp and pink, 0.4 OD, prosthetic OS  Macula: wnl OD, prosthetic OS  Vessels: atrophy OD, prosthetic OS  Periphery: wnl with no retinal lesions or retinitis OD, prosthetic OS   [facility] DEPARTMENT OF OPHTHALMOLOGY - INITIAL ADULT CONSULT  -----------------------------------------------------------------------------------------------------------------  Mark Field MD PGY 3  [phone]  -----------------------------------------------------------------------------------------------------------------    HPI: 72F with history of DM, COPD, chronic adrenal insufficiency, colorectal cancer s/p resection, prosthetic left eye, currently admitted for type A aortic dissection. Patient intubated and sedated, unable to obtain history.    PAST MEDICAL & SURGICAL HISTORY:  Atrial fibrillation  paroxysmal, on eliquis      Diabetes  Type 2      COPD (chronic obstructive pulmonary disease)      Adrenal insufficiency  Medrol daily for over 50 years      Aortic insufficiency  moderate AR on echo 5/3/2018      Pelvic fracture      Asthma      Tracheobronchomalacia  diagnosed 2015, s/p bronchial thermoplasty 2016 (Dr Zapien); recent bronchoscopy 6/5/2018 revealed no evidence of tracheobronchomalacia in trachea or bronchial tubes      Colorectal cancer  4/2018- last treatment , chemo and radiation      Rectal bleeding      Seizure  x 1 1/7/18      DVT (deep venous thrombosis)  15-20 years ago, took coumadin      TIA (transient ischemic attack)  multiple, last 5 years ago - presents as right-sided weakness      History of partial hysterectomy  30 years ago - fibroids      H/O total knee replacement, bilateral  5 years ago      History of sinus surgery  multiple sinus surgeries      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      H/O pelvic surgery  5 years ago - s/p fracture      History of tracheomalacia  2015 - attempted tracheal stenting ([facility])- course complicated by obstruction, respiratory failure, multiple CPR attempts -  stent discontinued; 10/20/2016 Tracheobronchoplasty (Prolene Mesh) performed at [facility] by Dr Zapien      S/P bronchoscopy  6/5/2018 - [place] (Dr Zapien) no evidence of tracheobronchomalacia in trachea or bronchial tubes      Rectal bleeding  exam under anesthesia ([facility]) 2/2018        Past Ocular History: left prosthetic  Ophthalmic Medications: none  FAMILY HISTORY:  Family history of asthma    Family history of breast cancer (Sibling)    Family history of diabetes mellitus type II      Social History: denies any complaints.    MEDICATIONS  (STANDING):  albuterol/ipratropium for Nebulization 3 milliLiter(s) Nebulizer every 6 hours  buDESOnide    Inhalation Suspension 0.5 milliGRAM(s) Inhalation every 12 hours  caspofungin IVPB 50 milliGRAM(s) IV Intermittent every 24 hours  chlorhexidine 0.12% Liquid 15 milliLiter(s) Oral Mucosa every 12 hours  chlorhexidine 2% Cloths 1 Application(s) Topical <User Schedule>  collagenase Ointment 1 Application(s) Topical daily  dexMEDEtomidine Infusion 0.5 MICROgram(s)/kG/Hr (8.38 mL/Hr) IV Continuous <Continuous>  digoxin  Injectable 125 MICROGram(s) IV Push daily  enoxaparin Injectable 40 milliGRAM(s) SubCutaneous every 24 hours  hydrocortisone sodium succinate Injectable 20 milliGRAM(s) IV Push <User Schedule>  insulin regular Infusion 3 Unit(s)/Hr (3 mL/Hr) IV Continuous <Continuous>  mexiletine 200 milliGRAM(s) Oral every 8 hours  multivitamin 1 Tablet(s) Oral daily  niCARdipine Infusion 5 mG/Hr (25 mL/Hr) IV Continuous <Continuous>  pantoprazole  Injectable 40 milliGRAM(s) IV Push daily  propofol Infusion 12.438 MICROgram(s)/kG/Min (5 mL/Hr) IV Continuous <Continuous>  sodium chloride 0.9%. 1000 milliLiter(s) (50 mL/Hr) IV Continuous <Continuous>  vancomycin  IVPB 1000 milliGRAM(s) IV Intermittent every 12 hours  vancomycin  IVPB        MEDICATIONS  (PRN):  simethicone drops 80 milliGRAM(s) Oral every 12 hours PRN Gas    Allergies & Intolerances:   aspirin (Short breath)  Avelox (Short breath; Pruritus)  cefepime (Anaphylaxis)  codeine (Short breath)  Dilaudid (Short breath)  iodine (Short breath; Swelling)  penicillin (Anaphylaxis)  shellfish (Anaphylaxis)  tetanus toxoid (Short breath)  Valium (Short breath)    Review of Systems:  Constitutional: No fever, chills  Eyes: No blurry vision, flashes, floaters, FBS, erythema, discharge, double vision, OU  Neuro: No tremors  Cardiovascular: No chest pain, palpitations  Respiratory: No SOB, no cough  GI: No nausea, vomiting, abdominal pain  : No dysuria  Skin: no rash  Psych: no depression  Endocrine: no polyuria, polydipsia  Heme/lymph: no swelling    VITALS: T(C): 37.4 (10-05-22 @ 07:00)  T(F): 99.3 (10-05-22 @ 07:00), Max: 100.6 (10-04-22 @ 17:00)  HR: 82 (10-05-22 @ 11:00) (60 - 93)  BP: 119/58 (10-05-22 @ 08:15) (119/58 - 119/58)  RR:  (10 - 40)  SpO2:  (96% - 100%)  Wt(kg): --  General: AAO x0    Ophthalmology Exam:  Visual acuity (sc): unable due to mental status  Pupils: Round and reactive OD  Ttono: 23 OD  Extraocular movements (EOMs): Full OU  Confrontational Visual Field (CVF): unable due to mental status  Color Plates: unable due to mental status    Pen Light Exam (PLE)  External: Flat OD, prosthetic OS  Lids/Lashes/Lacrimal Ducts: Flat OD, prosthetic OS  Sclera/Conjunctiva: W+Q without injection OD, prosthetic OS;  Cornea: Cl OD, prosthetic OS  Anterior Chamber: D+F without hypopyon OD, prosthetic OS;  Iris: Flat OD, prosthetic OS  Lens: PCIOL OD, prosthetic OS    Fundus Exam: dilated with 1% tropicamide and 2.5% phenylephrine  Approval obtained from primary team for dilation  Patient aware that pupils can remained dilated for at least 4-6 hours  Exam performed with 20D lens    Vitreous: wnl OD without vitritis, prosthetic OS  Disc, cup/disc: sharp and pink, 0.4 OD, prosthetic OS  Macula: wnl OD, prosthetic OS  Vessels: atrophy OD, prosthetic OS  Periphery: wnl with no retinal lesions or retinitis OD, prosthetic OS

## 2022-10-05 NOTE — PROGRESS NOTE ADULT - ASSESSMENT
71 year-old female with a history of DM2, CAD, A-Fib on apixaban, Severe Persistent Asthma (on chronic steroids, recently started on Tezspire), colon cancer s/p resection/chemo, and tracheobronchomalacia s/p tracheoplasty, and recent OM of the R foot s/b debridement and completed course of Vancomycin/Ertapenem for MRSA/ESBL Proteus/Corynebacterium who now presents with chest pain, found to have Type A dissection s/p repair with modified Bentall procedure and hemiarch replacement on 9/6/22. Post-op course complicated by acute hypoxemic respiratory failure, shock, anemia, and hyperglycemia requiring insulin gtt. Extubated 9/13, now awake and alert with mild encephalopathy but overall significantly improved.    Assessment:  Acute hypoxemic respiratory failure requiring intubation  Type A Aortic Dissection  Severe Persistent Asthma  History of Tracheobronchomalacia  fevers and MSSA bacteremia and tracheal aspirate material with MSSA    Fevers and MRSA bacteremia:  newly positive 1/2 blood cultures sent 9/30 with Neela glabrata  Source- most likely medi-port (which was removed)  prior cultures from 9/28 with high grade MRSA bacteremia  Continue IV vancomycin- trough 12. on 10/3 is therapeutic maintain current dosing   TTE performed 10/1 no evidence of vegetations on the valves EF improving  Will plan to treat for 4-6 weeks in the setting of post surgical repair of thoracic aorta dissection  Continue to follow CBC  Continue to follow creatinine  Continue to follow Vanco trough levels  repeat blood cultures for evidence of fungemia and bacteremia clearance  non emergent optho exam in the setting of fungemia    Would check syphilis screen    Hepatitis B,C screens negative      Discussed with CTU team    Jeff Calixto MD  Can be called via Teams  After 5pm/weekends [phone]       71 year-old female with a history of DM2, CAD, A-Fib on apixaban, Severe Persistent Asthma (on chronic steroids, recently started on Tezspire), colon cancer s/p resection/chemo, and tracheobronchomalacia s/p tracheoplasty, and recent OM of the R foot s/b debridement and completed course of Vancomycin/Ertapenem for MRSA/ESBL Proteus/Corynebacterium who now presents with chest pain, found to have Type A dissection s/p repair with modified Bentall procedure and hemiarch replacement on 9/6/22. Post-op course complicated by acute hypoxemic respiratory failure, shock, anemia, and hyperglycemia requiring insulin gtt. Extubated 9/13, now awake and alert with mild encephalopathy but overall significantly improved.    Assessment:  Acute hypoxemic respiratory failure requiring intubation  Type A Aortic Dissection  Severe Persistent Asthma  History of Tracheobronchomalacia  fevers and MSSA bacteremia and tracheal aspirate material with MSSA    Increasing leukocytosis  Fevers and MRSA bacteremia  newly positive 1/2 blood cultures sent 9/30 with Candida glabrata  Source for bacteremia and fungemia- most likely medi-port (which was removed)  prior cultures from 9/28 with high grade MRSA bacteremia  Continue IV vancomycin- trough 16. on 10/4 is therapeutic maintain current dosing   TTE performed 10/1 no evidence of vegetations on the valves EF improving but would favor repeating another TTE  Leukocytosis could also be in part due to extensive DVT RUE  Agree with abdominal sonogram in the setting of increasing GPT and alk phos      MRSA  bacteremia and candidemia  Will plan to treat for 4-6 weeks in the setting of post surgical repair of thoracic aorta dissection  Continue to follow CBC  Continue to follow creatinine  Continue to follow Vanco trough levels  repeat blood cultures for evidence of fungemia and bacteremia clearance  non emergent optho exam in the setting of fungemia          Discussed with CTU team    Jeff Calixto MD  Can be called via Teams  After 5pm/weekends [phone]

## 2022-10-05 NOTE — ASSESSMENT
[FreeTextEntry1] : 1) s/p rehab discharge after ICU hospitalization for lung infection/COPD-bronchiectasis exacerbation c/b pressure wounds to R foot.  One non healing so far, posterior to R medial malleolus.  Has wound care in place.  Needs help at home while she recuperates, perhaps for wound nurse checks in between office visits.  [facility] referral apparently in place after d/c, but pt hoping something for next few days.  Will attempt to put her in for homecare referral here ASAP for intake, ?PT/strengthening, ?wound care nurse as well.  \par \par ADDENDUM - pt back to hospital 7/3, had respiratory difficulty, change in MS, ?related to foot infection/osteo that was further dxd/rxd.  FC

## 2022-10-05 NOTE — CONSULT NOTE ADULT - ASSESSMENT
INCOMPLETE NOTE, FINAL RECS TO FOLLOW    Assessment and Recommendations:  72y female w/ pmhx/ochx of DM, COPD, chronic adrenal insufficiency, colorectal cancer s/p resection, prosthetic left eye, consulted for rule out candida endophthalmitis. Exam limited due to intubation and sedation. Anterior segment exam with penlight revealed no injection or hypopyon. Posterior segment exam with 20D lens after dilation revealed ***.   # Rule out candida endophthalmitis   - Prosthetic right eye  - Blood cultures from 9/30 grew Neela glabrata; repeat with no growth  - Unable to obtain visual acuity or complaints due to intubation and sedation  - Anterior segment exam without injection or hypopyon  - Posterior segment exam without ***  - Findings and plan discussed with patient and primary team.    Patient discussed with Dr. Quiñones    Outpatient follow-up: Patient should follow-up with his/her ophthalmologist or with [facility] Department of Ophthalmology at the address below     [address] [place] [address]. [address]  [place] [address]  [phone]     Assessment and Recommendations:  72y female w/ pmhx/ochx of DM, COPD, chronic adrenal insufficiency, colorectal cancer s/p resection, prosthetic left eye, consulted for rule out candida endophthalmitis. Exam limited due to intubation and sedation. Anterior segment exam with penlight revealed no injection or hypopyon. Posterior segment exam with 20D lens after dilation revealed no vitritis, retinitis, or retinal lesions .   # Rule out candida endophthalmitis   - Prosthetic right eye  - Blood cultures from 9/30 grew Neela glabrata; repeat with no growth  - Unable to obtain visual acuity or complaints due to intubation and sedation  - Anterior segment exam without injection or hypopyon  - Posterior segment exam without vitritis, retinitis, or retinal lesions   - Low suspicion for candida endophthalmitis   - Findings and plan discussed with patient and primary team.    Patient discussed with Dr. Quiñones    Outpatient follow-up: Patient should follow-up with his/her ophthalmologist or with [facility] Department of Ophthalmology at the address below     [address] [place] [address]. [address]  [place] [id number] [phone]

## 2022-10-05 NOTE — PROGRESS NOTE ADULT - SUBJECTIVE AND OBJECTIVE BOX
INFECTIOUS DISEASES FOLLOW UP-- Fouzia Calixto  [phone]    This is a follow up note for this  72yFemale with  Dissection of thoracic aorta        ROS:  CONSTITUTIONAL:  No fever, good appetite  CARDIOVASCULAR:  No chest pain or palpitations  RESPIRATORY:  No dyspnea  GASTROINTESTINAL:  No nausea, vomiting, diarrhea, or abdominal pain  GENITOURINARY:  No dysuria  NEUROLOGIC:  No headache,     Allergies    aspirin (Short breath)  Avelox (Short breath; Pruritus)  cefepime (Anaphylaxis)  codeine (Short breath)  Dilaudid (Short breath)  iodine (Short breath; Swelling)  penicillin (Anaphylaxis)  shellfish (Anaphylaxis)  tetanus toxoid (Short breath)  Valium (Short breath)    Intolerances        ANTIBIOTICS/RELEVANT:  antimicrobials  caspofungin IVPB 50 milliGRAM(s) IV Intermittent every 24 hours  vancomycin  IVPB 1000 milliGRAM(s) IV Intermittent every 12 hours  vancomycin  IVPB        immunologic:    OTHER:  albuterol/ipratropium for Nebulization 3 milliLiter(s) Nebulizer every 6 hours  buDESOnide    Inhalation Suspension 0.5 milliGRAM(s) Inhalation every 12 hours  chlorhexidine 0.12% Liquid 15 milliLiter(s) Oral Mucosa every 12 hours  chlorhexidine 2% Cloths 1 Application(s) Topical <User Schedule>  collagenase Ointment 1 Application(s) Topical daily  dexMEDEtomidine Infusion 0.5 MICROgram(s)/kG/Hr IV Continuous <Continuous>  digoxin  Injectable 125 MICROGram(s) IV Push daily  enoxaparin Injectable 40 milliGRAM(s) SubCutaneous every 24 hours  hydrocortisone sodium succinate Injectable 20 milliGRAM(s) IV Push <User Schedule>  insulin regular Infusion 3 Unit(s)/Hr IV Continuous <Continuous>  metoprolol tartrate 25 milliGRAM(s) Oral two times a day  mexiletine 200 milliGRAM(s) Oral every 8 hours  multivitamin 1 Tablet(s) Oral daily  niCARdipine Infusion 5 mG/Hr IV Continuous <Continuous>  pantoprazole  Injectable 40 milliGRAM(s) IV Push daily  propofol Infusion 12.438 MICROgram(s)/kG/Min IV Continuous <Continuous>  simethicone drops 80 milliGRAM(s) Oral every 12 hours PRN  sodium chloride 0.9%. 1000 milliLiter(s) IV Continuous <Continuous>      Objective:  Vital Signs Last 24 Hrs  T(C): 37.4 (05 Oct 2022 07:00), Max: 38.1 (04 Oct 2022 17:00)  T(F): 99.3 (05 Oct 2022 07:00), Max: 100.6 (04 Oct 2022 17:00)  HR: 88 (05 Oct 2022 11:30) (60 - 93)  BP: 119/58 (05 Oct 2022 08:15) (119/58 - 119/58)  BP(mean): 83 (05 Oct 2022 08:15) (83 - 83)  RR: 32 (05 Oct 2022 11:30) (10 - 40)  SpO2: 99% (05 Oct 2022 11:30) (96% - 100%)    Parameters below as of 05 Oct 2022 09:00  Patient On (Oxygen Delivery Method): BiPAP/CPAP,10/5        PHYSICAL EXAM:  Constitutional:no acute distress  Eyes:EBER, EOMI  Ear/Nose/Throat: no oral lesions, 	  Respiratory: clear BL  Cardiovascular: S1S2  Gastrointestinal:soft, (+) BS, no tenderness  Extremities:no e/e/c  No Lymphadenopathy  IV sites not inflammed.    LABS:                        8.8    25.66 )-----------( 171      ( 05 Oct 2022 01:11 )             28.9     10-05    149<H>  |  115<H>  |  23  ----------------------------<  146<H>  3.6   |  25  |  0.43<L>    Ca    8.1<L>      05 Oct 2022 01:11  Phos  1.5     10-05  Mg     1.8     10-05    TPro  5.5<L>  /  Alb  3.2<L>  /  TBili  0.8  /  DBili  x   /  AST  52<H>  /  ALT  221<H>  /  AlkPhos  210<H>  10-05          MICROBIOLOGY:            RECENT CULTURES:  10-04 @ 17:19  .Sputum Sputum  --  --  --  --  --  10-01 @ 18:55  .Surgical Swab Port Device  --  --  --    No growth  --  10-01 @ 01:47  .Blood Blood-Peripheral  --  --  --    No growth to date.  --  10-01 @ 01:24  .Blood Blood-Peripheral  --  --  --    No growth to date.  --  09-30 @ 11:45  .Blood Blood  Blood Culture PCR  Blood Culture PCR  PCR    No growth to date.  --  09-29 @ 18:33  .Bronchial Bronchial Lavage  --  --  --    Normal Respiratory Jessica present  --      RADIOLOGY & ADDITIONAL STUDIES:    < from: Xray Chest 1 View- PORTABLE-Routine (Xray Chest 1 View- PORTABLE-Routine in AM.) (10.04.22 @ 03:17) >    IMPRESSION:    Lines and tubes in good position.  Left basilar opacity likely atelectasis or effusion    < end of copied text >   INFECTIOUS DISEASES FOLLOW UP-- Fouzia Calixto  [phone]    This is a follow up note for this  72yFemale with  Dissection of thoracic aorta        ROS:  CONSTITUTIONAL:  opens eyes, can follow simple commands    Allergies    aspirin (Short breath)  Avelox (Short breath; Pruritus)  cefepime (Anaphylaxis)  codeine (Short breath)  Dilaudid (Short breath)  iodine (Short breath; Swelling)  penicillin (Anaphylaxis)  shellfish (Anaphylaxis)  tetanus toxoid (Short breath)  Valium (Short breath)    Intolerances        ANTIBIOTICS/RELEVANT:  antimicrobials  caspofungin IVPB 50 milliGRAM(s) IV Intermittent every 24 hours  vancomycin  IVPB 1000 milliGRAM(s) IV Intermittent every 12 hours  vancomycin  IVPB        immunologic:    OTHER:  albuterol/ipratropium for Nebulization 3 milliLiter(s) Nebulizer every 6 hours  buDESOnide    Inhalation Suspension 0.5 milliGRAM(s) Inhalation every 12 hours  chlorhexidine 0.12% Liquid 15 milliLiter(s) Oral Mucosa every 12 hours  chlorhexidine 2% Cloths 1 Application(s) Topical <User Schedule>  collagenase Ointment 1 Application(s) Topical daily  dexMEDEtomidine Infusion 0.5 MICROgram(s)/kG/Hr IV Continuous <Continuous>  digoxin  Injectable 125 MICROGram(s) IV Push daily  enoxaparin Injectable 40 milliGRAM(s) SubCutaneous every 24 hours  hydrocortisone sodium succinate Injectable 20 milliGRAM(s) IV Push <User Schedule>  insulin regular Infusion 3 Unit(s)/Hr IV Continuous <Continuous>  metoprolol tartrate 25 milliGRAM(s) Oral two times a day  mexiletine 200 milliGRAM(s) Oral every 8 hours  multivitamin 1 Tablet(s) Oral daily  niCARdipine Infusion 5 mG/Hr IV Continuous <Continuous>  pantoprazole  Injectable 40 milliGRAM(s) IV Push daily  propofol Infusion 12.438 MICROgram(s)/kG/Min IV Continuous <Continuous>  simethicone drops 80 milliGRAM(s) Oral every 12 hours PRN  sodium chloride 0.9%. 1000 milliLiter(s) IV Continuous <Continuous>      Objective:  Vital Signs Last 24 Hrs  T(C): 37.4 (05 Oct 2022 07:00), Max: 38.1 (04 Oct 2022 17:00)  T(F): 99.3 (05 Oct 2022 07:00), Max: 100.6 (04 Oct 2022 17:00)  HR: 88 (05 Oct 2022 11:30) (60 - 93)  BP: 119/58 (05 Oct 2022 08:15) (119/58 - 119/58)  BP(mean): 83 (05 Oct 2022 08:15) (83 - 83)  RR: 32 (05 Oct 2022 11:30) (10 - 40)  SpO2: 99% (05 Oct 2022 11:30) (96% - 100%)    Parameters below as of 05 Oct 2022 09:00  Patient On (Oxygen Delivery Method): BiPAP/CPAP,10/5        PHYSICAL EXAM:  Constitutional:no acute distress  Eyes:EBER, EOMI  Ear/Nose/Throat: no oral lesions, ET tube thin secretions	  Respiratory: clear BL  Cardiovascular: S1S2 dressing steronotomy CDI  Gastrointestinal:soft, (+) BS, no tenderness  Extremities:no e/e/c RUE swelling throughout   No Lymphadenopathy  IV sites not inflammed.    LABS:                        8.8    25.66 )-----------( 171      ( 05 Oct 2022 01:11 )             28.9     10-05    149<H>  |  115<H>  |  23  ----------------------------<  146<H>  3.6   |  25  |  0.43<L>    Ca    8.1<L>      05 Oct 2022 01:11  Phos  1.5     10-05  Mg     1.8     10-05    TPro  5.5<L>  /  Alb  3.2<L>  /  TBili  0.8  /  DBili  x   /  AST  52<H>  /  ALT  221<H>  /  AlkPhos  210<H>  10-05          MICROBIOLOGY:            RECENT CULTURES:  10-04 @ 17:19  .Sputum Sputum  --  --  --  --  --  10-01 @ 18:55  .Surgical Swab Port Device  --  --  --    No growth  --  10-01 @ 01:47  .Blood Blood-Peripheral  --  --  --    No growth to date.  --  10-01 @ 01:24  .Blood Blood-Peripheral  --  --  --    No growth to date.  --  09-30 @ 11:45  .Blood Blood  Blood Culture PCR  Blood Culture PCR  PCR    No growth to date.  --  09-29 @ 18:33  .Bronchial Bronchial Lavage  --  --  --    Normal Respiratory Jessica present  --      RADIOLOGY & ADDITIONAL STUDIES:    < from: Xray Chest 1 View- PORTABLE-Routine (Xray Chest 1 View- PORTABLE-Routine in AM.) (10.04.22 @ 03:17) >    IMPRESSION:    Lines and tubes in good position.  Left basilar opacity likely atelectasis or effusion    < end of copied text >

## 2022-10-05 NOTE — PROGRESS NOTE ADULT - PROBLEM SELECTOR PLAN 2
On chronic steroids at home: medrol 8mg qd   titrated by CT team to HCT 50mg q8h on  9/29 and 100mg q8h on 9/30 and back to 50mg q8h today  Since pt is now more stable and off pressors consider start slow taper back to oral Prednisone chronic dose.  suggested taper   TBD daily depending on pts hemodynamic stability-  day 1 HCT 50mg q8h x 24hour   day 2 HCT 25 mg q8h x24 hours. received last dose this am so discussed timing of steprids with Dr Stevenson as follows:  HCT  20 mg at 1600 today  HCT 10mg at 8 am, HCT 10mg at 1600 TOMORROW  HCT 10mg at 8am 10/7  Prednisone 8mg qd 10/8  Will follow up pt status and taper

## 2022-10-05 NOTE — PROGRESS NOTE ADULT - NSPROGADDITIONALINFOA_GEN_ALL_CORE
-Plan discussed with pt/team.  Contact info: [phone] (24/7). pager [phone]  [email or web address] password [email or web address]  Teams  Spent  28 minutes assessing pt/labs/meds and discussing plan of care with primary team  Adjusting insulin  Discharge plan  Follow up care

## 2022-10-05 NOTE — HISTORY OF PRESENT ILLNESS
[FreeTextEntry1] : Here to check in and get help with home services [de-identified] : Had PNA/bronchiectasis exacerbation in March.  Was intubated, bed bound for long period.  Developed ankle wound related to pressure/immobility RLE at malleolus and upward toward calf.  Has dressing change now/in boot.  She is now s/p discharge from long rehab period.  Saw wound clinic/podiatry yesterday - dressing done, inspected.  To have imaging to be sure no deeper involvement.  Now that she's home, feels she needs help with visiting nurse/wound checks, etc.  Order was put in at [facility] on her discharge, but with weekend coming up, hasn't had plans solidified - wondering if we can achieve anything quicker.  Breathing ok, ambulating with her boot.  Has daughter checking in with her daily.

## 2022-10-05 NOTE — PROGRESS NOTE ADULT - SUBJECTIVE AND OBJECTIVE BOX
CRITICAL CARE ATTENDING - CTICU    MEDICATIONS  (STANDING):  albuterol/ipratropium for Nebulization 3 milliLiter(s) Nebulizer every 6 hours  buDESOnide    Inhalation Suspension 0.5 milliGRAM(s) Inhalation every 12 hours  caspofungin IVPB 50 milliGRAM(s) IV Intermittent every 24 hours  chlorhexidine 0.12% Liquid 15 milliLiter(s) Oral Mucosa every 12 hours  chlorhexidine 2% Cloths 1 Application(s) Topical <User Schedule>  collagenase Ointment 1 Application(s) Topical daily  dexMEDEtomidine Infusion 0.5 MICROgram(s)/kG/Hr (8.38 mL/Hr) IV Continuous <Continuous>  digoxin  Injectable 125 MICROGram(s) IV Push daily  enoxaparin Injectable 40 milliGRAM(s) SubCutaneous every 24 hours  fentaNYL    Injectable 50 MICROGram(s) IV Push once  hydrocortisone sodium succinate Injectable 25 milliGRAM(s) IV Push every 8 hours  insulin regular Infusion 3 Unit(s)/Hr (3 mL/Hr) IV Continuous <Continuous>  mexiletine 200 milliGRAM(s) Oral every 8 hours  multivitamin 1 Tablet(s) Oral daily  niCARdipine Infusion 5 mG/Hr (25 mL/Hr) IV Continuous <Continuous>  pantoprazole  Injectable 40 milliGRAM(s) IV Push daily  propofol Infusion 12.438 MICROgram(s)/kG/Min (5 mL/Hr) IV Continuous <Continuous>  sodium chloride 0.9%. 1000 milliLiter(s) (50 mL/Hr) IV Continuous <Continuous>  vancomycin  IVPB 1000 milliGRAM(s) IV Intermittent every 12 hours  vancomycin  IVPB                                8.8    25.66 )-----------( 171      ( 05 Oct 2022 01:11 )             28.9       10-05    149<H>  |  115<H>  |  23  ----------------------------<  146<H>  3.6   |  25  |  0.43<L>    Ca    8.1<L>      05 Oct 2022 01:11  Phos  1.5     10-05  Mg     1.8     10-05    TPro  5.5<L>  /  Alb  3.2<L>  /  TBili  0.8  /  DBili  x   /  AST  52<H>  /  ALT  221<H>  /  AlkPhos  210<H>  10-05          Mode: CPAP with PS  FiO2: 40  PEEP: 5  PS: 10  MAP: 9  PIP: 18      Daily     Daily Weight in k.1 (05 Oct 2022 00:00)      10-03 @ :  -  10-04 @ 07:00  --------------------------------------------------------  IN: 3465.4 mL / OUT: 1790 mL / NET: 1675.4 mL    10-04 @ 07:  -  10-05 @ 06:05  --------------------------------------------------------  IN: 2988.8 mL / OUT: 2445 mL / NET: 543.8 mL          Critically Ill patient  : [ ] preoperative ,   [x ] post operative    Requires :  [x ] Arterial Line   [x ] Central Line  [ ] PA catheter  [ ] IABP  [ ] ECMO  [ ] LVAD  [ x] Ventilator [ ] pacemaker [ ] Impella.                      [x ] ABG's     [ x] Pulse Oxymetry Monitoring  Bedside evaluation , monitoring , treatment of hemodynamics , fluids , IVP/ IVCD meds.        Diagnosis:      - Re Admit to CTICU - Septic Shock      Type A dissection repair, Modified bentall and hemiarch     Hypovolemia      Respiratory Failure       Requires chest PT, pulmonary toilet, ambu bagging, suctioning to maintain SaO2,  patent airway and treat atelectasis.     Difficult weaning process - multiple organ system involvement in critically ill patient     Ventilator Management:  [x]AC-rest -   PC    [ x]CPAP-PS Wean ?   [ ]Trach Collar     [ ]Extubate    [ ] T-Piece  [x ]peep>5    Hemodynamic lability,  instability. Requires IVCD [] vasopressors [ ]  inotropes  [x ] vasodilator  [x ]IVSS fluid  to maintain MAP, perfusion, C.I.      DM- IVCD Insulin      IVCD precedex and Propofol - maintain vent synchrony     Metabolic Acidosis    Hypernatremia     Hypocalcemia      COPD     Colostomy - colon CA     Renal Failure - Acute Kidney Injury     ? multifocal PNA ?    Requires bedside physical therapy, mobilization and total skilled nursing care               By signing my name below, I, Bharti Barrios, attest that this documentation has been prepared under the direction and in the presence of Bon Jiménez MD.   Electronically Signed: Georgi Gottlieb 10-05-22 @ 06:05      Discussed with CT surgeon, Physician Assistant - Nurse Practitioner- Critical care medicine team.   Discussed at  AM / PM rounds.   Chart, labs , films reviewed.    Cumulative Critical Care Time Given Today:  CRITICAL CARE ATTENDING - CTICU    MEDICATIONS  (STANDING):  albuterol/ipratropium for Nebulization 3 milliLiter(s) Nebulizer every 6 hours  buDESOnide    Inhalation Suspension 0.5 milliGRAM(s) Inhalation every 12 hours  caspofungin IVPB 50 milliGRAM(s) IV Intermittent every 24 hours  chlorhexidine 0.12% Liquid 15 milliLiter(s) Oral Mucosa every 12 hours  chlorhexidine 2% Cloths 1 Application(s) Topical <User Schedule>  collagenase Ointment 1 Application(s) Topical daily  dexMEDEtomidine Infusion 0.5 MICROgram(s)/kG/Hr (8.38 mL/Hr) IV Continuous <Continuous>  digoxin  Injectable 125 MICROGram(s) IV Push daily  enoxaparin Injectable 40 milliGRAM(s) SubCutaneous every 24 hours  fentaNYL    Injectable 50 MICROGram(s) IV Push once  hydrocortisone sodium succinate Injectable 25 milliGRAM(s) IV Push every 8 hours  insulin regular Infusion 3 Unit(s)/Hr (3 mL/Hr) IV Continuous <Continuous>  mexiletine 200 milliGRAM(s) Oral every 8 hours  multivitamin 1 Tablet(s) Oral daily  niCARdipine Infusion 5 mG/Hr (25 mL/Hr) IV Continuous <Continuous>  pantoprazole  Injectable 40 milliGRAM(s) IV Push daily  propofol Infusion 12.438 MICROgram(s)/kG/Min (5 mL/Hr) IV Continuous <Continuous>  sodium chloride 0.9%. 1000 milliLiter(s) (50 mL/Hr) IV Continuous <Continuous>  vancomycin  IVPB 1000 milliGRAM(s) IV Intermittent every 12 hours  vancomycin  IVPB                                8.8    25.66 )-----------( 171      ( 05 Oct 2022 01:11 )             28.9       10-05    149<H>  |  115<H>  |  23  ----------------------------<  146<H>  3.6   |  25  |  0.43<L>    Ca    8.1<L>      05 Oct 2022 01:11  Phos  1.5     10-05  Mg     1.8     10-05    TPro  5.5<L>  /  Alb  3.2<L>  /  TBili  0.8  /  DBili  x   /  AST  52<H>  /  ALT  221<H>  /  AlkPhos  210<H>  10-05          Mode: CPAP with PS  FiO2: 40  PEEP: 5  PS: 10  MAP: 9  PIP: 18      Daily     Daily Weight in k.1 (05 Oct 2022 00:00)      10-03 @ :  -  10-04 @ 07:00  --------------------------------------------------------  IN: 3465.4 mL / OUT: 1790 mL / NET: 1675.4 mL    10-04 @ 07:  -  10-05 @ 06:05  --------------------------------------------------------  IN: 2988.8 mL / OUT: 2445 mL / NET: 543.8 mL          Critically Ill patient  : [ ] preoperative ,   [x ] post operative    Requires :  [x ] Arterial Line   [x ] Central Line  [ ] PA catheter  [ ] IABP  [ ] ECMO  [ ] LVAD  [ x] Ventilator [ ] pacemaker [ ] Impella.                      [x ] ABG's     [ x] Pulse Oxymetry Monitoring  Bedside evaluation , monitoring , treatment of hemodynamics , fluids , IVP/ IVCD meds.        Diagnosis:      - Re Admit to CTICU - Septic Shock  / Spsis     Type A dissection repair, Modified bentall and hemiarch     Hypovolemia      Respiratory Failure       Requires chest PT, pulmonary toilet, ambu bagging, suctioning to maintain SaO2,  patent airway and treat atelectasis.     Difficult weaning process - multiple organ system involvement in critically ill patient     CHF- acute [ ]   chronic [ x]    systolic [ ]   diastolic [x ]  Valvular [ ]          - Echo- EF -             [ ] RV dysfunction          - Cxr-cardiomegally, edema          - Clinical-  [ ]inotropes   [ ]pressors   [x ]diuresis   [ ]IABP   [ ]ECMO   [ ]LVAD   [ x]Respiratory Failure        -     Ventilator Management:  [x]AC-rest -   PC    [ x]CPAP-PS Wean ?   [ ]Trach Collar     [ ]Extubate    [ ] T-Piece  [x ]peep>5    Hemodynamic lability,  instability. Requires IVCD [] vasopressors [ ]  inotropes  [x ] vasodilator  [x ]IVSS fluid  to maintain MAP, perfusion, C.I.      DM- IVCD Insulin      IVCD precedex and Propofol - maintain vent synchrony     Metabolic Acidosis    Hypernatremia     Hypocalcemia      COPD     Colostomy - colon CA     Renal Failure - Acute Kidney Injury     ? multifocal PNA  / Aspiration     Candida in Urine  / Blood     Requires bedside physical therapy, mobilization and total USP care               By signing my name below, I, Bharti Barrios, attest that this documentation has been prepared under the direction and in the presence of Bon Jiménez MD.   Electronically Signed: Georgi Gottlieb 10-05-22 @ 06:05    I, Bon Jiménez, personally performed the services described in this documentation. All medical record entries made by the scribe were at my direction and in my presence. I have reviewed the chart and agree that the record reflects my personal performance and is accurate and complete.   Bon Jiménez MD.       Discussed with CT surgeon, Physician Assistant - Nurse Practitioner- Critical care medicine team.   Discussed at  AM / PM rounds.   Chart, labs , films reviewed.    Cumulative Critical Care Time Given Today:  30 min

## 2022-10-06 LAB
-  AMPHOTERICIN B: SIGNIFICANT CHANGE UP
-  AMPICILLIN/SULBACTAM: SIGNIFICANT CHANGE UP
-  ANIDULAFUNGIN: SIGNIFICANT CHANGE UP
-  CASPOFUNGIN: SIGNIFICANT CHANGE UP
-  CEFAZOLIN: SIGNIFICANT CHANGE UP
-  CLINDAMYCIN: SIGNIFICANT CHANGE UP
-  ERYTHROMYCIN: SIGNIFICANT CHANGE UP
-  FLUCONAZOLE: SIGNIFICANT CHANGE UP
-  GENTAMICIN: SIGNIFICANT CHANGE UP
-  LINEZOLID: SIGNIFICANT CHANGE UP
-  MICAFUNGIN: SIGNIFICANT CHANGE UP
-  OXACILLIN: SIGNIFICANT CHANGE UP
-  PENICILLIN: SIGNIFICANT CHANGE UP
-  POSACONAZOLE: SIGNIFICANT CHANGE UP
-  RIFAMPIN: SIGNIFICANT CHANGE UP
-  TETRACYCLINE: SIGNIFICANT CHANGE UP
-  TRIMETHOPRIM/SULFAMETHOXAZOLE: SIGNIFICANT CHANGE UP
-  VANCOMYCIN: SIGNIFICANT CHANGE UP
-  VORICONAZOLE: SIGNIFICANT CHANGE UP
ALBUMIN SERPL ELPH-MCNC: 2.6 G/DL — LOW (ref 3.3–5)
ALP SERPL-CCNC: 200 U/L — HIGH (ref 40–120)
ALT FLD-CCNC: 166 U/L — HIGH (ref 10–45)
ANION GAP SERPL CALC-SCNC: 7 MMOL/L — SIGNIFICANT CHANGE UP (ref 5–17)
APTT BLD: 42.2 SEC — HIGH (ref 27.5–35.5)
APTT BLD: 46.7 SEC — HIGH (ref 27.5–35.5)
APTT BLD: 50.8 SEC — HIGH (ref 27.5–35.5)
APTT BLD: 54.5 SEC — HIGH (ref 27.5–35.5)
AST SERPL-CCNC: 46 U/L — HIGH (ref 10–40)
BILIRUB SERPL-MCNC: 0.6 MG/DL — SIGNIFICANT CHANGE UP (ref 0.2–1.2)
BUN SERPL-MCNC: 22 MG/DL — SIGNIFICANT CHANGE UP (ref 7–23)
CALCIUM SERPL-MCNC: 7.8 MG/DL — LOW (ref 8.4–10.5)
CHLORIDE SERPL-SCNC: 112 MMOL/L — HIGH (ref 96–108)
CO2 SERPL-SCNC: 24 MMOL/L — SIGNIFICANT CHANGE UP (ref 22–31)
CREAT SERPL-MCNC: 0.5 MG/DL — SIGNIFICANT CHANGE UP (ref 0.5–1.3)
CULTURE RESULTS: SIGNIFICANT CHANGE UP
EGFR: 98 ML/MIN/1.73M2 — SIGNIFICANT CHANGE UP
GAS PNL BLDA: SIGNIFICANT CHANGE UP
GLUCOSE BLDC GLUCOMTR-MCNC: 107 MG/DL — HIGH (ref 70–99)
GLUCOSE BLDC GLUCOMTR-MCNC: 111 MG/DL — HIGH (ref 70–99)
GLUCOSE BLDC GLUCOMTR-MCNC: 122 MG/DL — HIGH (ref 70–99)
GLUCOSE BLDC GLUCOMTR-MCNC: 129 MG/DL — HIGH (ref 70–99)
GLUCOSE BLDC GLUCOMTR-MCNC: 131 MG/DL — HIGH (ref 70–99)
GLUCOSE BLDC GLUCOMTR-MCNC: 132 MG/DL — HIGH (ref 70–99)
GLUCOSE BLDC GLUCOMTR-MCNC: 136 MG/DL — HIGH (ref 70–99)
GLUCOSE BLDC GLUCOMTR-MCNC: 136 MG/DL — HIGH (ref 70–99)
GLUCOSE BLDC GLUCOMTR-MCNC: 138 MG/DL — HIGH (ref 70–99)
GLUCOSE BLDC GLUCOMTR-MCNC: 138 MG/DL — HIGH (ref 70–99)
GLUCOSE BLDC GLUCOMTR-MCNC: 139 MG/DL — HIGH (ref 70–99)
GLUCOSE BLDC GLUCOMTR-MCNC: 142 MG/DL — HIGH (ref 70–99)
GLUCOSE BLDC GLUCOMTR-MCNC: 142 MG/DL — HIGH (ref 70–99)
GLUCOSE BLDC GLUCOMTR-MCNC: 144 MG/DL — HIGH (ref 70–99)
GLUCOSE BLDC GLUCOMTR-MCNC: 145 MG/DL — HIGH (ref 70–99)
GLUCOSE BLDC GLUCOMTR-MCNC: 145 MG/DL — HIGH (ref 70–99)
GLUCOSE BLDC GLUCOMTR-MCNC: 152 MG/DL — HIGH (ref 70–99)
GLUCOSE BLDC GLUCOMTR-MCNC: 163 MG/DL — HIGH (ref 70–99)
GLUCOSE SERPL-MCNC: 146 MG/DL — HIGH (ref 70–99)
HCT VFR BLD CALC: 29.4 % — LOW (ref 34.5–45)
HGB BLD-MCNC: 9.1 G/DL — LOW (ref 11.5–15.5)
MAGNESIUM SERPL-MCNC: 1.7 MG/DL — SIGNIFICANT CHANGE UP (ref 1.6–2.6)
MCHC RBC-ENTMCNC: 24.8 PG — LOW (ref 27–34)
MCHC RBC-ENTMCNC: 31 GM/DL — LOW (ref 32–36)
MCV RBC AUTO: 80.1 FL — SIGNIFICANT CHANGE UP (ref 80–100)
METHOD TYPE: SIGNIFICANT CHANGE UP
METHOD TYPE: SIGNIFICANT CHANGE UP
NRBC # BLD: 4 /100 WBCS — HIGH (ref 0–0)
ORGANISM # SPEC MICROSCOPIC CNT: SIGNIFICANT CHANGE UP
PHOSPHATE SERPL-MCNC: 2.5 MG/DL — SIGNIFICANT CHANGE UP (ref 2.5–4.5)
PLATELET # BLD AUTO: 167 K/UL — SIGNIFICANT CHANGE UP (ref 150–400)
POTASSIUM BLDA-SCNC: 3.9 MMOL/L — SIGNIFICANT CHANGE UP (ref 3.5–5.1)
POTASSIUM SERPL-MCNC: 4.3 MMOL/L — SIGNIFICANT CHANGE UP (ref 3.5–5.3)
POTASSIUM SERPL-SCNC: 4.3 MMOL/L — SIGNIFICANT CHANGE UP (ref 3.5–5.3)
PROT SERPL-MCNC: 5.2 G/DL — LOW (ref 6–8.3)
RBC # BLD: 3.67 M/UL — LOW (ref 3.8–5.2)
RBC # FLD: 28.3 % — HIGH (ref 10.3–14.5)
SODIUM SERPL-SCNC: 143 MMOL/L — SIGNIFICANT CHANGE UP (ref 135–145)
SPECIMEN SOURCE: SIGNIFICANT CHANGE UP
VANCOMYCIN TROUGH SERPL-MCNC: 14.6 UG/ML — SIGNIFICANT CHANGE UP (ref 10–20)
VANCOMYCIN TROUGH SERPL-MCNC: 14.9 UG/ML — SIGNIFICANT CHANGE UP (ref 10–20)
WBC # BLD: 29.35 K/UL — HIGH (ref 3.8–10.5)
WBC # FLD AUTO: 29.35 K/UL — HIGH (ref 3.8–10.5)

## 2022-10-06 PROCEDURE — 71045 X-RAY EXAM CHEST 1 VIEW: CPT | Mod: 26

## 2022-10-06 PROCEDURE — 99232 SBSQ HOSP IP/OBS MODERATE 35: CPT

## 2022-10-06 PROCEDURE — 99291 CRITICAL CARE FIRST HOUR: CPT

## 2022-10-06 RX ORDER — POTASSIUM CHLORIDE 20 MEQ
20 PACKET (EA) ORAL ONCE
Refills: 0 | Status: COMPLETED | OUTPATIENT
Start: 2022-10-06 | End: 2022-10-06

## 2022-10-06 RX ORDER — MAGNESIUM SULFATE 500 MG/ML
2 VIAL (ML) INJECTION ONCE
Refills: 0 | Status: COMPLETED | OUTPATIENT
Start: 2022-10-06 | End: 2022-10-06

## 2022-10-06 RX ORDER — FUROSEMIDE 40 MG
20 TABLET ORAL
Refills: 0 | Status: DISCONTINUED | OUTPATIENT
Start: 2022-10-07 | End: 2022-10-10

## 2022-10-06 RX ORDER — FUROSEMIDE 40 MG
20 TABLET ORAL ONCE
Refills: 0 | Status: COMPLETED | OUTPATIENT
Start: 2022-10-06 | End: 2022-10-06

## 2022-10-06 RX ORDER — VANCOMYCIN HCL 1 G
125 VIAL (EA) INTRAVENOUS EVERY 12 HOURS
Refills: 0 | Status: DISCONTINUED | OUTPATIENT
Start: 2022-10-06 | End: 2022-10-10

## 2022-10-06 RX ADMIN — Medication 20 MILLIEQUIVALENT(S): at 17:08

## 2022-10-06 RX ADMIN — Medication 1 TABLET(S): at 12:07

## 2022-10-06 RX ADMIN — MEXILETINE HYDROCHLORIDE 200 MILLIGRAM(S): 150 CAPSULE ORAL at 05:37

## 2022-10-06 RX ADMIN — Medication 3 MILLILITER(S): at 11:45

## 2022-10-06 RX ADMIN — DEXMEDETOMIDINE HYDROCHLORIDE IN 0.9% SODIUM CHLORIDE 8.38 MICROGRAM(S)/KG/HR: 4 INJECTION INTRAVENOUS at 21:19

## 2022-10-06 RX ADMIN — PANTOPRAZOLE SODIUM 40 MILLIGRAM(S): 20 TABLET, DELAYED RELEASE ORAL at 12:06

## 2022-10-06 RX ADMIN — Medication 10 MILLIGRAM(S): at 17:08

## 2022-10-06 RX ADMIN — Medication 1 APPLICATION(S): at 12:05

## 2022-10-06 RX ADMIN — Medication 10 MILLIGRAM(S): at 07:51

## 2022-10-06 RX ADMIN — HEPARIN SODIUM 10 UNIT(S)/HR: 5000 INJECTION INTRAVENOUS; SUBCUTANEOUS at 21:20

## 2022-10-06 RX ADMIN — Medication 3 MILLILITER(S): at 00:42

## 2022-10-06 RX ADMIN — Medication 3 MILLILITER(S): at 17:26

## 2022-10-06 RX ADMIN — Medication 250 MILLIGRAM(S): at 06:18

## 2022-10-06 RX ADMIN — Medication 0.5 MILLIGRAM(S): at 17:26

## 2022-10-06 RX ADMIN — Medication 125 MICROGRAM(S): at 12:07

## 2022-10-06 RX ADMIN — Medication 3 MILLILITER(S): at 06:42

## 2022-10-06 RX ADMIN — Medication 250 MILLIGRAM(S): at 17:58

## 2022-10-06 RX ADMIN — Medication 25 MILLIGRAM(S): at 05:40

## 2022-10-06 RX ADMIN — CASPOFUNGIN ACETATE 260 MILLIGRAM(S): 7 INJECTION, POWDER, LYOPHILIZED, FOR SOLUTION INTRAVENOUS at 07:50

## 2022-10-06 RX ADMIN — INSULIN HUMAN 3 UNIT(S)/HR: 100 INJECTION, SOLUTION SUBCUTANEOUS at 21:20

## 2022-10-06 RX ADMIN — Medication 3 MILLILITER(S): at 23:03

## 2022-10-06 RX ADMIN — Medication 20 MILLIGRAM(S): at 10:38

## 2022-10-06 RX ADMIN — CHLORHEXIDINE GLUCONATE 1 APPLICATION(S): 213 SOLUTION TOPICAL at 06:43

## 2022-10-06 RX ADMIN — Medication 0.5 MILLIGRAM(S): at 06:42

## 2022-10-06 RX ADMIN — CHLORHEXIDINE GLUCONATE 15 MILLILITER(S): 213 SOLUTION TOPICAL at 17:09

## 2022-10-06 RX ADMIN — MEXILETINE HYDROCHLORIDE 200 MILLIGRAM(S): 150 CAPSULE ORAL at 13:06

## 2022-10-06 RX ADMIN — Medication 25 MILLIGRAM(S): at 17:08

## 2022-10-06 RX ADMIN — CHLORHEXIDINE GLUCONATE 15 MILLILITER(S): 213 SOLUTION TOPICAL at 05:37

## 2022-10-06 RX ADMIN — MEXILETINE HYDROCHLORIDE 200 MILLIGRAM(S): 150 CAPSULE ORAL at 21:19

## 2022-10-06 RX ADMIN — Medication 25 GRAM(S): at 04:16

## 2022-10-06 NOTE — PROGRESS NOTE ADULT - NUTRITIONAL ASSESSMENT

## 2022-10-06 NOTE — PROGRESS NOTE ADULT - SUBJECTIVE AND OBJECTIVE BOX
CRITICAL CARE ATTENDING - CTICU    MEDICATIONS  (STANDING):  albuterol/ipratropium for Nebulization 3 milliLiter(s) Nebulizer every 6 hours  buDESOnide    Inhalation Suspension 0.5 milliGRAM(s) Inhalation every 12 hours  caspofungin IVPB 50 milliGRAM(s) IV Intermittent every 24 hours  chlorhexidine 0.12% Liquid 15 milliLiter(s) Oral Mucosa every 12 hours  chlorhexidine 2% Cloths 1 Application(s) Topical <User Schedule>  collagenase Ointment 1 Application(s) Topical daily  dexMEDEtomidine Infusion 0.5 MICROgram(s)/kG/Hr (8.38 mL/Hr) IV Continuous <Continuous>  digoxin  Injectable 125 MICROGram(s) IV Push daily  enoxaparin Injectable 40 milliGRAM(s) SubCutaneous every 24 hours  fentaNYL    Injectable 50 MICROGram(s) IV Push once  hydrocortisone sodium succinate Injectable 25 milliGRAM(s) IV Push every 8 hours  insulin regular Infusion 3 Unit(s)/Hr (3 mL/Hr) IV Continuous <Continuous>  mexiletine 200 milliGRAM(s) Oral every 8 hours  multivitamin 1 Tablet(s) Oral daily  niCARdipine Infusion 5 mG/Hr (25 mL/Hr) IV Continuous <Continuous>  pantoprazole  Injectable 40 milliGRAM(s) IV Push daily  propofol Infusion 12.438 MICROgram(s)/kG/Min (5 mL/Hr) IV Continuous <Continuous>  sodium chloride 0.9%. 1000 milliLiter(s) (50 mL/Hr) IV Continuous <Continuous>  vancomycin  IVPB 1000 milliGRAM(s) IV Intermittent every 12 hours  vancomycin  IVPB                              9.1    29.35 )-----------( 167      ( 06 Oct 2022 01:36 )             29.4     Mode: CPAP with PS  10-06    143  |  112<H>  |  22  ----------------------------<  146<H>  4.3   |  24  |  0.50    Ca    7.8<L>      06 Oct 2022 01:36  Phos  2.5     10-06  Mg     1.7     10-06    TPro  5.2<L>  /  Alb  2.6<L>  /  TBili  0.6  /  DBili  x   /  AST  46<H>  /  ALT  166<H>  /  AlkPhos  200<H>  10-06  FiO2: 40  PEEP: 5  PS: 10  MAP: 9  PIP: 18      Daily     Daily Weight in k.1 (05 Oct 2022 00:00)    I&O's Detail    05 Oct 2022 07:01  -  06 Oct 2022 07:00  --------------------------------------------------------  IN:    Dexmedetomidine: 212.4 mL    Free Water: 1200 mL    Glucerna 1.5: 1320 mL    Insulin: 56.5 mL    IV PiggyBack: 180 mL    IV PiggyBack: 250 mL    NiCARdipine: 175 mL    Propofol: 144.4 mL    sodium chloride 0.9%: 120 mL  Total IN: 3658.3 mL    OUT:    Colostomy (mL): 300 mL    Indwelling Catheter - Urethral (mL): 2030 mL  Total OUT: 2330 mL    Total NET: 1328.3 mL      06 Oct 2022 07:01  -  06 Oct 2022 09:40  --------------------------------------------------------  IN:    Dexmedetomidine: 17 mL    Glucerna 1.5: 110 mL    Insulin: 5 mL    Propofol: 12.2 mL    sodium chloride 0.9%: 10 mL  Total IN: 154.2 mL    OUT:    Indwelling Catheter - Urethral (mL): 155 mL    NiCARdipine: 0 mL  Total OUT: 155 mL    Total NET: -0.8 mL          Critically Ill patient  : [ ] preoperative ,   [x ] post operative    Requires :  [x ] Arterial Line   [x ] Central Line  [ ] PA catheter  [ ] IABP  [ ] ECMO  [ ] LVAD  [ x] Ventilator [ ] pacemaker [ ] Impella.                      [x ] ABG's     [ x] Pulse Oxymetry Monitoring  Bedside evaluation , monitoring , treatment of hemodynamics , fluids , IVP/ IVCD meds.        Diagnosis:      - Re Admit to CTICU - Septic Shock  / Spsis     Type A dissection repair, Modified bentall and hemiarch     Hypovolemia      Respiratory Failure       Requires chest PT, pulmonary toilet, ambu bagging, suctioning to maintain SaO2,  patent airway and treat atelectasis.     Difficult weaning process - multiple organ system involvement in critically ill patient     CHF- acute [ ]   chronic [ x]    systolic [ ]   diastolic [x ]  Valvular [ ]          - Echo- EF -             [ ] RV dysfunction          - Cxr-cardiomegally, edema          - Clinical-  [ ]inotropes   [ ]pressors   [x ]diuresis   [ ]IABP   [ ]ECMO   [ ]LVAD   [ x]Respiratory Failure        -     Ventilator Management:  [x]AC-rest -   PC    [ x]CPAP-PS Wean ?   [ ]Trach Collar     [ ]Extubate    [ ] T-Piece  [x ]peep>5    Hemodynamic lability,  instability. Requires IVCD [] vasopressors [ ]  inotropes  [x ] vasodilator  [x ]IVSS fluid  to maintain MAP, perfusion, C.I.      DM- IVCD Insulin      IVCD precedex and Propofol - maintain vent synchrony     Metabolic Acidosis    Hypernatremia     Hypocalcemia      COPD     Colostomy - colon CA     Renal Failure - Acute Kidney Injury     ? multifocal PNA  / Aspiration     Candida in Urine  / Blood     Requires bedside physical therapy, mobilization and total alf care       Discussed with CT surgeon, Physician Assistant - Nurse Practitioner- Critical care medicine team.   Discussed at  AM / PM rounds.   Chart, labs , films reviewed.    Cumulative Critical Care Time Given Today:  30 min

## 2022-10-06 NOTE — PROGRESS NOTE ADULT - SUBJECTIVE AND OBJECTIVE BOX
INFECTIOUS DISEASES FOLLOW UP-- Fouzia Calixto  [phone]    This is a follow up note for this  72yFemale with  Dissection of thoracic aorta        ROS:  CONSTITUTIONAL:  No fever, good appetite  CARDIOVASCULAR:  No chest pain or palpitations  RESPIRATORY:  No dyspnea  GASTROINTESTINAL:  No nausea, vomiting, diarrhea, or abdominal pain  GENITOURINARY:  No dysuria  NEUROLOGIC:  No headache,     Allergies    aspirin (Short breath)  Avelox (Short breath; Pruritus)  cefepime (Anaphylaxis)  codeine (Short breath)  Dilaudid (Short breath)  iodine (Short breath; Swelling)  penicillin (Anaphylaxis)  shellfish (Anaphylaxis)  tetanus toxoid (Short breath)  Valium (Short breath)    Intolerances        ANTIBIOTICS/RELEVANT:  antimicrobials  caspofungin IVPB 50 milliGRAM(s) IV Intermittent every 24 hours  vancomycin  IVPB 1000 milliGRAM(s) IV Intermittent every 12 hours  vancomycin  IVPB        immunologic:    OTHER:  albuterol/ipratropium for Nebulization 3 milliLiter(s) Nebulizer every 6 hours  buDESOnide    Inhalation Suspension 0.5 milliGRAM(s) Inhalation every 12 hours  chlorhexidine 0.12% Liquid 15 milliLiter(s) Oral Mucosa every 12 hours  chlorhexidine 2% Cloths 1 Application(s) Topical <User Schedule>  collagenase Ointment 1 Application(s) Topical daily  dexMEDEtomidine Infusion 0.5 MICROgram(s)/kG/Hr IV Continuous <Continuous>  digoxin  Injectable 125 MICROGram(s) IV Push daily  heparin  Infusion 600 Unit(s)/Hr IV Continuous <Continuous>  hydrocortisone sodium succinate Injectable 10 milliGRAM(s) IV Push <User Schedule>  insulin regular Infusion 3 Unit(s)/Hr IV Continuous <Continuous>  metoprolol tartrate 25 milliGRAM(s) Oral two times a day  mexiletine 200 milliGRAM(s) Oral every 8 hours  multivitamin 1 Tablet(s) Oral daily  pantoprazole  Injectable 40 milliGRAM(s) IV Push daily  simethicone drops 80 milliGRAM(s) Oral every 12 hours PRN  sodium chloride 0.9%. 1000 milliLiter(s) IV Continuous <Continuous>      Objective:  Vital Signs Last 24 Hrs  T(C): 37.8 (06 Oct 2022 12:00), Max: 38.1 (05 Oct 2022 16:00)  T(F): 100 (06 Oct 2022 12:00), Max: 100.6 (05 Oct 2022 16:00)  HR: 84 (06 Oct 2022 13:00) (66 - 92)  BP: --  BP(mean): --  RR: 28 (06 Oct 2022 13:00) (10 - 35)  SpO2: 98% (06 Oct 2022 13:00) (95% - 100%)    Parameters below as of 06 Oct 2022 12:00  Patient On (Oxygen Delivery Method): ventilator    O2 Concentration (%): 40    PHYSICAL EXAM:  Constitutional:no acute distress  Eyes:EBER, EOMI  Ear/Nose/Throat: no oral lesions, 	  Respiratory: clear BL  Cardiovascular: S1S2  Gastrointestinal:soft, (+) BS, no tenderness  Extremities:no e/e/c  No Lymphadenopathy  IV sites not inflammed.    LABS:                        9.1    29.35 )-----------( 167      ( 06 Oct 2022 01:36 )             29.4     10-06    143  |  112<H>  |  22  ----------------------------<  146<H>  4.3   |  24  |  0.50    Ca    7.8<L>      06 Oct 2022 01:36  Phos  2.5     10-06  Mg     1.7     10-06    TPro  5.2<L>  /  Alb  2.6<L>  /  TBili  0.6  /  DBili  x   /  AST  46<H>  /  ALT  166<H>  /  AlkPhos  200<H>  10-06    PTT - ( 06 Oct 2022 12:27 )  PTT:54.5 sec      MICROBIOLOGY:            RECENT CULTURES:  10-05 @ 05:00  .Blood Blood  --  --  --    No growth to date.  --  10-05 @ 03:55  .Blood Blood  --  --  --    No growth to date.  --  10-04 @ 17:19  .Sputum Sputum  --  --  --    Moderate Staphylococcus aureus  --  10-01 @ 18:55  .Surgical Swab Port Device  --  --  --    No growth  --  10-01 @ 01:47  .Blood Blood-Peripheral  --  --  --    No Growth Final  --  10-01 @ 01:24  .Blood Blood-Peripheral  --  --  --    No Growth Final  --  09-30 @ 11:45  .Blood Blood  Blood Culture PCR  Blood Culture PCR  PCR    No Growth Final  --  09-29 @ 18:33  .Bronchial Bronchial Lavage  --  --  --    Normal Respiratory Jessica present  --      RADIOLOGY & ADDITIONAL STUDIES: INFECTIOUS DISEASES FOLLOW UP-- Fouzia Calixto  [phone]    This is a follow up note for this  72yFemale with  Dissection of thoracic aorta  s/p repair        ROS:  CONSTITUTIONAL:  sleepy but opens eyes      Allergies    aspirin (Short breath)  Avelox (Short breath; Pruritus)  cefepime (Anaphylaxis)  codeine (Short breath)  Dilaudid (Short breath)  iodine (Short breath; Swelling)  penicillin (Anaphylaxis)  shellfish (Anaphylaxis)  tetanus toxoid (Short breath)  Valium (Short breath)    Intolerances        ANTIBIOTICS/RELEVANT:  antimicrobials  caspofungin IVPB 50 milliGRAM(s) IV Intermittent every 24 hours  vancomycin  IVPB 1000 milliGRAM(s) IV Intermittent every 12 hours  vancomycin  IVPB        immunologic:    OTHER:  albuterol/ipratropium for Nebulization 3 milliLiter(s) Nebulizer every 6 hours  buDESOnide    Inhalation Suspension 0.5 milliGRAM(s) Inhalation every 12 hours  chlorhexidine 0.12% Liquid 15 milliLiter(s) Oral Mucosa every 12 hours  chlorhexidine 2% Cloths 1 Application(s) Topical <User Schedule>  collagenase Ointment 1 Application(s) Topical daily  dexMEDEtomidine Infusion 0.5 MICROgram(s)/kG/Hr IV Continuous <Continuous>  digoxin  Injectable 125 MICROGram(s) IV Push daily  heparin  Infusion 600 Unit(s)/Hr IV Continuous <Continuous>  hydrocortisone sodium succinate Injectable 10 milliGRAM(s) IV Push <User Schedule>  insulin regular Infusion 3 Unit(s)/Hr IV Continuous <Continuous>  metoprolol tartrate 25 milliGRAM(s) Oral two times a day  mexiletine 200 milliGRAM(s) Oral every 8 hours  multivitamin 1 Tablet(s) Oral daily  pantoprazole  Injectable 40 milliGRAM(s) IV Push daily  simethicone drops 80 milliGRAM(s) Oral every 12 hours PRN  sodium chloride 0.9%. 1000 milliLiter(s) IV Continuous <Continuous>      Objective:  Vital Signs Last 24 Hrs  T(C): 37.8 (06 Oct 2022 12:00), Max: 38.1 (05 Oct 2022 16:00)  T(F): 100 (06 Oct 2022 12:00), Max: 100.6 (05 Oct 2022 16:00)  HR: 84 (06 Oct 2022 13:00) (66 - 92)  BP: --  BP(mean): --  RR: 28 (06 Oct 2022 13:00) (10 - 35)  SpO2: 98% (06 Oct 2022 13:00) (95% - 100%)    Parameters below as of 06 Oct 2022 12:00  Patient On (Oxygen Delivery Method): ventilator    O2 Concentration (%): 40    PHYSICAL EXAM:  Constitutional:no acute distress  Eyes:EBER, EOMI  Ear/Nose/Throat: no oral lesions, intubated tentative plans for trach placement next week	  Respiratory: clear BL  Cardiovascular: S1S2  sternotomy site dressing intact  Gastrointestinal:soft, (+) BS, no tenderness  Extremities:no e/e/c  No Lymphadenopathy  IV sites not inflammed.    LABS:                        9.1    29.35 )-----------( 167      ( 06 Oct 2022 01:36 )             29.4     10-06    143  |  112<H>  |  22  ----------------------------<  146<H>  4.3   |  24  |  0.50    Ca    7.8<L>      06 Oct 2022 01:36  Phos  2.5     10-06  Mg     1.7     10-06    TPro  5.2<L>  /  Alb  2.6<L>  /  TBili  0.6  /  DBili  x   /  AST  46<H>  /  ALT  166<H>  /  AlkPhos  200<H>  10-06    PTT - ( 06 Oct 2022 12:27 )  PTT:54.5 sec      MICROBIOLOGY:            RECENT CULTURES:  10-05 @ 05:00  .Blood Blood  --  --  --    No growth to date.  --  10-05 @ 03:55  .Blood Blood  --  --  --    No growth to date.  --  10-04 @ 17:19  .Sputum Sputum  --  --  --    Moderate Staphylococcus aureus  --  10-01 @ 18:55  .Surgical Swab Port Device  --  --  --    No growth  --  10-01 @ 01:47  .Blood Blood-Peripheral  --  --  --    No Growth Final  --  10-01 @ 01:24  .Blood Blood-Peripheral  --  --  --    No Growth Final  --  09-30 @ 11:45  .Blood Blood  Blood Culture PCR  Blood Culture PCR  PCR    No Growth Final  --  09-29 @ 18:33  .Bronchial Bronchial Lavage  --  --  --    Normal Respiratory Jessica present  --      RADIOLOGY & ADDITIONAL STUDIES:    < from: US Abdomen Upper Quadrant Right (10.05.22 @ 15:18) >    IMPRESSION:    Mild nodular contour of the liver, consistent with cirrhosis. Small   ascites.    Aortic dissection.    < end of copied text >

## 2022-10-06 NOTE — PROGRESS NOTE ADULT - TIME BILLING
as above: no real changes--remains in resp failure-sepsis [id number]-[id number] vented/ABX-improving LFTs  multifactorial dyspnea-resp failure-severe persistent asthma, TBM s/p tracheoplasty, s/p Aortic aneurysm repair, Bronchitis (proteus)-O2-keep 90%; wean as able off sedation  (hoping to avoid trach if able)  severe persistent asthma--medrol 8mg changed to hydrocortisone 100 q 8, singulair 10, duoneb q 6, budes .5 bid, tezspire 8/29-next 9/29  TBM-s/p tracheoplasty--accapella, unable to use vest due to surgery  s/p Aortic aneurysm repair--as per [facility]TS staff care  AF-on heparin--eventual eliquis rx               CV-improved cardene-MAP above 60  DVT R-IJ-on heparin rx  ID-s/p proteus bronchitis-s/p meropenum changed to ceftriaxone and back to meropenem/vanco- off of ABX as of 9/19--restart of ABX 9/27-meropenem/vanco-NOW solely vanco for MRSE sepsis and capsofungin as per ID  PT-OOB as able (on hold)                              GI-TF as able--f/up LFTs       VC dysfunction--aspiration precautions-sp and sw evaln--NGT in place/TF; FEESST passed 9/20  [facility]                                    Heme onc f/up colon ca  prog--critical in CTU                PT-when/if improved    Eulalio Allison MD-Pulmonary   [phone]

## 2022-10-06 NOTE — PROGRESS NOTE ADULT - SUBJECTIVE AND OBJECTIVE BOX
CHIEF COMPLAINT: f/up sob, chronic resp failure, TBM, severe persistent asthma, VC dysfunction, Type A aortic dissection s/p repair w/modified "Bentall procedure and hemiarch replacement 9/6-vented and sedated off nitrous-40%-sedated on vent, minimal weaning    Interval Events: MRSE-on ABX    REVIEW OF SYSTEMS:  Constitutional: No fevers or chills. No weight loss. No fatigue or generalized malaise.  Eyes: No itching or discharge from the eyes  ENT: No ear pain. No ear discharge. No nasal congestion. No post nasal drip. No epistaxis. No throat pain. No sore throat. No difficulty swallowing.   CV: No chest pain. No palpitations. No lightheadedness or dizziness.   Resp: No dyspnea at rest. No dyspnea on exertion. No orthopnea. No wheezing. No cough. No stridor. No sputum production. No chest pain with respiration.  GI: No nausea. No vomiting. No diarrhea.  MSK: No joint pain or pain in any extremities  Integumentary: No skin lesions. No pedal edema.  Neurological: No gross motor weakness. No sensory changes.  [ ] All other systems negative  [+ ] Unable to assess ROS because __sedated______    OBJECTIVE:  ICU Vital Signs Last 24 Hrs  T(C): 37.9 (06 Oct 2022 04:00), Max: 38.1 (05 Oct 2022 16:00)  T(F): 100.2 (06 Oct 2022 04:00), Max: 100.6 (05 Oct 2022 16:00)  HR: 79 (06 Oct 2022 04:00) (61 - 93)  BP: 119/58 (05 Oct 2022 08:15) (119/58 - 119/58)  BP(mean): 83 (05 Oct 2022 08:15) (83 - 83)  ABP: 130/50 (06 Oct 2022 04:00) (116/42 - 179/64)  ABP(mean): 74 (06 Oct 2022 04:00) (62 - 103)  RR: 25 (06 Oct 2022 04:00) (10 - 40)  SpO2: 100% (06 Oct 2022 04:00) (95% - 100%)    O2 Parameters below as of 05 Oct 2022 20:00  Patient On (Oxygen Delivery Method): ventilator          Mode: CPAP with PS, FiO2: 40, PEEP: 85, PS: 15, MAP: 11, PIP: 21    10-04 @ 07:01  -  10-05 @ 07:00  --------------------------------------------------------  IN: 4450.4 mL / OUT: 2920 mL / NET: 1530.4 mL    10-05 @ 07:01  -  10-06 @ 05:02  --------------------------------------------------------  IN: 2639.9 mL / OUT: 2020 mL / NET: 619.9 mL      CAPILLARY BLOOD GLUCOSE  123 (04 Oct 2022 17:00)      POCT Blood Glucose.: 145 mg/dL (06 Oct 2022 04:19)      PHYSICAL EXAM: NAD on vent/sedated  General: Awake, alert, oriented X 3.   HEENT: Atraumatic, normocephalic.                 Mallampatti Grade 3                No nasal congestion.                No tonsillar or pharyngeal exudates.  Lymph Nodes: No palpable lymphadenopathy  Neck: No JVD. No carotid bruit.   Respiratory: Normal chest expansion                         Normal percussion                         Normal and equal air entry                         No wheeze, rhonchi or rales.  Cardiovascular: S1 S2 normal. No murmurs, rubs or gallops.   Abdomen: Soft, non-tender, non-distended. No organomegaly. Normoactive bowel sounds.  Extremities: Warm to touch. Peripheral pulse palpable. No pedal edema.   Skin: No rashes or skin lesions  Neurological: Motor and sensory examination unable  Psychiatry: unable    HOSPITAL MEDICATIONS:  MEDICATIONS  (STANDING):  albuterol/ipratropium for Nebulization 3 milliLiter(s) Nebulizer every 6 hours  buDESOnide    Inhalation Suspension 0.5 milliGRAM(s) Inhalation every 12 hours  caspofungin IVPB 50 milliGRAM(s) IV Intermittent every 24 hours  chlorhexidine 0.12% Liquid 15 milliLiter(s) Oral Mucosa every 12 hours  chlorhexidine 2% Cloths 1 Application(s) Topical <User Schedule>  collagenase Ointment 1 Application(s) Topical daily  dexMEDEtomidine Infusion 0.5 MICROgram(s)/kG/Hr (8.38 mL/Hr) IV Continuous <Continuous>  digoxin  Injectable 125 MICROGram(s) IV Push daily  heparin  Infusion 600 Unit(s)/Hr (9 mL/Hr) IV Continuous <Continuous>  hydrocortisone sodium succinate Injectable 10 milliGRAM(s) IV Push <User Schedule>  insulin regular Infusion 3 Unit(s)/Hr (3 mL/Hr) IV Continuous <Continuous>  metoprolol tartrate 25 milliGRAM(s) Oral two times a day  mexiletine 200 milliGRAM(s) Oral every 8 hours  multivitamin 1 Tablet(s) Oral daily  niCARdipine Infusion 5 mG/Hr (25 mL/Hr) IV Continuous <Continuous>  pantoprazole  Injectable 40 milliGRAM(s) IV Push daily  propofol Infusion 12.438 MICROgram(s)/kG/Min (5 mL/Hr) IV Continuous <Continuous>  sodium chloride 0.9%. 1000 milliLiter(s) (50 mL/Hr) IV Continuous <Continuous>  vancomycin  IVPB 1000 milliGRAM(s) IV Intermittent every 12 hours  vancomycin  IVPB        MEDICATIONS  (PRN):  simethicone drops 80 milliGRAM(s) Oral every 12 hours PRN Gas      LABS:                        9.1    29.35 )-----------( 167      ( 06 Oct 2022 01:36 )             29.4     10-06    143  |  112<H>  |  22  ----------------------------<  146<H>  4.3   |  24  |  0.50    Ca    7.8<L>      06 Oct 2022 01:36  Phos  2.5     10-06  Mg     1.7     10-06    TPro  5.2<L>  /  Alb  2.6<L>  /  TBili  0.6  /  DBili  x   /  AST  46<H>  /  ALT  166<H>  /  AlkPhos  200<H>  10-06    PTT - ( 06 Oct 2022 01:36 )  PTT:42.2 sec    Arterial Blood Gas:  10-06 @ 01:30  7.51/33/116/26/99.1/3.3  ABG lactate: --  Arterial Blood Gas:  10-05 @ 10:15  7.43/41/81/27/96.9/2.6  ABG lactate: --  Arterial Blood Gas:  10-05 @ 06:35  7.43/39/77/26/96.3/1.5  ABG lactate: --  Arterial Blood Gas:  10-05 @ 00:30  7.49/36/101/27/99.0/3.9  ABG lactate: --  Arterial Blood Gas:  10-04 @ 18:18  7.48/36/136/27/98.8/3.2  ABG lactate: --    Venous Blood Gas:  10-04 @ 18:18  7.45/41/40/28/72.1  VBG Lactate: --      MICROBIOLOGY:     RADIOLOGY:  [ ] Reviewed and interpreted by me    Point of Care Ultrasound Findings:    PFT:    EKG:

## 2022-10-06 NOTE — PROGRESS NOTE ADULT - ASSESSMENT
71 year-old female with a history of DM2, CAD, A-Fib on apixaban, Severe Persistent Asthma (on chronic steroids, recently started on Tezspire), colon cancer s/p resection/chemo, and tracheobronchomalacia s/p tracheoplasty, and recent OM of the R foot s/b debridement and completed course of Vancomycin/Ertapenem for MRSA/ESBL Proteus/Corynebacterium who now presents with chest pain, found to have Type A dissection s/p repair with modified Bentall procedure and hemiarch replacement on 9/6/22. Post-op course complicated by acute hypoxemic respiratory failure, shock, anemia, and hyperglycemia requiring insulin gtt. Extubated 9/13, now awake and alert with mild encephalopathy but overall significantly improved.    Assessment:  Acute hypoxemic respiratory failure requiring intubation  Type A Aortic Dissection  Severe Persistent Asthma  History of Tracheobronchomalacia  fevers and MSSA bacteremia and tracheal aspirate material with MSSA    Increasing leukocytosis  Fevers and MRSA bacteremia  newly positive 1/2 blood cultures sent 9/30 with Candida glabrata  Source for bacteremia and fungemia- most likely medi-port (which was removed)  prior cultures from 9/28 with high grade MRSA bacteremia  Continue IV vancomycin- trough 16. on 10/4 is therapeutic maintain current dosing   TTE performed 10/1 no evidence of vegetations on the valves EF improving but would favor repeating another TTE  Leukocytosis could also be in part due to extensive DVT RUE  Agree with abdominal sonogram in the setting of increasing GPT and alk phos      MRSA  bacteremia and candidemia  Will plan to treat for 4-6 weeks in the setting of post surgical repair of thoracic aorta dissection  Continue to follow CBC  Continue to follow creatinine  Continue to follow Vanco trough levels  repeat blood cultures for evidence of fungemia and bacteremia clearance  non emergent optho exam in the setting of fungemia          Discussed with CTU team    Jeff Calixto MD  Can be called via Teams  After 5pm/weekends [phone]       71 year-old female with a history of DM2, CAD, A-Fib on apixaban, Severe Persistent Asthma (on chronic steroids, recently started on Tezspire), colon cancer s/p resection/chemo, and tracheobronchomalacia s/p tracheoplasty, and recent OM of the R foot s/b debridement and completed course of Vancomycin/Ertapenem for MRSA/ESBL Proteus/Corynebacterium who now presents with chest pain, found to have Type A dissection s/p repair with modified Bentall procedure and hemiarch replacement on 9/6/22. Post-op course complicated by acute hypoxemic respiratory failure, shock, anemia, and hyperglycemia requiring insulin gtt. Extubated 9/13, now awake and alert with mild encephalopathy but overall significantly improved.    Assessment:  Acute hypoxemic respiratory failure requiring intubation  Type A Aortic Dissection  Severe Persistent Asthma  History of Tracheobronchomalacia  fevers and MSSA bacteremia and tracheal aspirate material with MSSA    Increasing leukocytosis  Fevers and MRSA bacteremia  newly positive 1/2 blood cultures sent 9/30 with Candida glabrata  Source for bacteremia and fungemia- most likely medi-port (which was removed)  prior cultures from 9/28 with high grade MRSA bacteremia  Continue IV vancomycin- trough 16. on 10/4 is therapeutic maintain current dosing   TTE performed 10/1 no evidence of vegetations on the valves EF improving but would favor repeating another TTE  Leukocytosis could also be in part due to extensive DVT RUE  Agree with abdominal sonogram in the setting of increasing GPT and alk phos  repeat blood cultures with rising WBC      MRSA  bacteremia and candidemia  Will plan to treat for 4-6 weeks in the setting of post surgical repair of thoracic aorta dissection  Continue to follow CBC  Continue to follow creatinine  Continue to follow Vanco trough levels 10/6 =14.6  repeat blood cultures for evidence of fungemia and bacteremia clearance and in the setting of increasing leukocytosis  may need imaging with CT if feasible to look for collection            Jeff Calixto MD  Can be called via Teams  After 5pm/weekends [phone]

## 2022-10-06 NOTE — PROGRESS NOTE ADULT - ASSESSMENT
71 year-old female with a history of DM2, CAD, A-Fib on apixaban, Severe Persistent Asthma (on chronic steroids, recently started on Tezspire), colon cancer s/p resection/chemo, and tracheobronchomalacia s/p tracheoplasty, and recent OM of the R foot s/b debridement and completed course of Vancomycin/Ertapenem for MRSA/ESBL Proteus/Corynebacterium who now presents with chest pain, found to have Type A dissection s/p repair with modified Bentall procedure and hemiarch replacement on 22. Post-op course complicated by acute hypoxemic respiratory failure, shock, anemia, and hyperglycemia requiring insulin gtt. Extubated , now awake and alert with mild encephalopathy but overall significantly improved.    Assessment:  Acute hypoxemic respiratory failure requiring intubation  Type A Aortic Dissection  Severe Persistent Asthma  History of Tracheobronchomalacia    Plan:  See below:  -**************************                                SEE Below:  -improving but weakness  9/15-ABX adjusted to ceftriaxone (LFTS)-PICU  -PICU, low grade temp-fever work up in progress, no resp decline or sx  -resp stable at present but tenuous  -for FEESST today, NGT in place w/TF  -s/p FEESST-passed, remains in PICU          -TF in place at 40cc, more OOB          -hypoglycemia noted and rx, no change in resp status  -vented/sedated-pressors/inotropes/ABX  -remains vented on nitrous/less O2 needs, improving LFTS                   -vented/semi sedated--less O2 needs  10/3-on vanco, weaning sedation/, all lines changed  10/6-no changes-now afebrile-on vanco

## 2022-10-07 LAB
ALBUMIN SERPL ELPH-MCNC: 2.5 G/DL — LOW (ref 3.3–5)
ALP SERPL-CCNC: 168 U/L — HIGH (ref 40–120)
ALT FLD-CCNC: 112 U/L — HIGH (ref 10–45)
ANION GAP SERPL CALC-SCNC: 6 MMOL/L — SIGNIFICANT CHANGE UP (ref 5–17)
APTT BLD: 47 SEC — HIGH (ref 27.5–35.5)
APTT BLD: 56.3 SEC — HIGH (ref 27.5–35.5)
APTT BLD: 58 SEC — HIGH (ref 27.5–35.5)
AST SERPL-CCNC: 31 U/L — SIGNIFICANT CHANGE UP (ref 10–40)
BILIRUB SERPL-MCNC: 0.5 MG/DL — SIGNIFICANT CHANGE UP (ref 0.2–1.2)
BUN SERPL-MCNC: 20 MG/DL — SIGNIFICANT CHANGE UP (ref 7–23)
CALCIUM SERPL-MCNC: 8 MG/DL — LOW (ref 8.4–10.5)
CHLORIDE SERPL-SCNC: 109 MMOL/L — HIGH (ref 96–108)
CO2 SERPL-SCNC: 27 MMOL/L — SIGNIFICANT CHANGE UP (ref 22–31)
CREAT SERPL-MCNC: 0.47 MG/DL — LOW (ref 0.5–1.3)
CULTURE RESULTS: NO GROWTH — SIGNIFICANT CHANGE UP
EGFR: 100 ML/MIN/1.73M2 — SIGNIFICANT CHANGE UP
GAS PNL BLDA: SIGNIFICANT CHANGE UP
GAS PNL BLDA: SIGNIFICANT CHANGE UP
GLUCOSE BLDC GLUCOMTR-MCNC: 107 MG/DL — HIGH (ref 70–99)
GLUCOSE BLDC GLUCOMTR-MCNC: 109 MG/DL — HIGH (ref 70–99)
GLUCOSE BLDC GLUCOMTR-MCNC: 121 MG/DL — HIGH (ref 70–99)
GLUCOSE BLDC GLUCOMTR-MCNC: 129 MG/DL — HIGH (ref 70–99)
GLUCOSE BLDC GLUCOMTR-MCNC: 130 MG/DL — HIGH (ref 70–99)
GLUCOSE BLDC GLUCOMTR-MCNC: 133 MG/DL — HIGH (ref 70–99)
GLUCOSE BLDC GLUCOMTR-MCNC: 134 MG/DL — HIGH (ref 70–99)
GLUCOSE BLDC GLUCOMTR-MCNC: 141 MG/DL — HIGH (ref 70–99)
GLUCOSE BLDC GLUCOMTR-MCNC: 142 MG/DL — HIGH (ref 70–99)
GLUCOSE BLDC GLUCOMTR-MCNC: 144 MG/DL — HIGH (ref 70–99)
GLUCOSE BLDC GLUCOMTR-MCNC: 147 MG/DL — HIGH (ref 70–99)
GLUCOSE BLDC GLUCOMTR-MCNC: 188 MG/DL — HIGH (ref 70–99)
GLUCOSE BLDC GLUCOMTR-MCNC: 244 MG/DL — HIGH (ref 70–99)
GLUCOSE SERPL-MCNC: 113 MG/DL — HIGH (ref 70–99)
HCT VFR BLD CALC: 27.5 % — LOW (ref 34.5–45)
HGB BLD-MCNC: 8.2 G/DL — LOW (ref 11.5–15.5)
MAGNESIUM SERPL-MCNC: 1.8 MG/DL — SIGNIFICANT CHANGE UP (ref 1.6–2.6)
MCHC RBC-ENTMCNC: 24.4 PG — LOW (ref 27–34)
MCHC RBC-ENTMCNC: 29.8 GM/DL — LOW (ref 32–36)
MCV RBC AUTO: 81.8 FL — SIGNIFICANT CHANGE UP (ref 80–100)
NRBC # BLD: 3 /100 WBCS — HIGH (ref 0–0)
PHOSPHATE SERPL-MCNC: 2.3 MG/DL — LOW (ref 2.5–4.5)
PLATELET # BLD AUTO: 152 K/UL — SIGNIFICANT CHANGE UP (ref 150–400)
POTASSIUM SERPL-MCNC: 4.3 MMOL/L — SIGNIFICANT CHANGE UP (ref 3.5–5.3)
POTASSIUM SERPL-SCNC: 4.3 MMOL/L — SIGNIFICANT CHANGE UP (ref 3.5–5.3)
PROT SERPL-MCNC: 5.2 G/DL — LOW (ref 6–8.3)
RBC # BLD: 3.36 M/UL — LOW (ref 3.8–5.2)
RBC # FLD: 28.7 % — HIGH (ref 10.3–14.5)
SODIUM SERPL-SCNC: 142 MMOL/L — SIGNIFICANT CHANGE UP (ref 135–145)
SPECIMEN SOURCE: SIGNIFICANT CHANGE UP
WBC # BLD: 18.32 K/UL — HIGH (ref 3.8–10.5)
WBC # FLD AUTO: 18.32 K/UL — HIGH (ref 3.8–10.5)

## 2022-10-07 PROCEDURE — 99232 SBSQ HOSP IP/OBS MODERATE 35: CPT

## 2022-10-07 PROCEDURE — 71045 X-RAY EXAM CHEST 1 VIEW: CPT | Mod: 26,77

## 2022-10-07 PROCEDURE — 99292 CRITICAL CARE ADDL 30 MIN: CPT | Mod: 24

## 2022-10-07 PROCEDURE — 99291 CRITICAL CARE FIRST HOUR: CPT

## 2022-10-07 PROCEDURE — 71045 X-RAY EXAM CHEST 1 VIEW: CPT | Mod: 26

## 2022-10-07 RX ORDER — MAGNESIUM SULFATE 500 MG/ML
2 VIAL (ML) INJECTION ONCE
Refills: 0 | Status: COMPLETED | OUTPATIENT
Start: 2022-10-07 | End: 2022-10-07

## 2022-10-07 RX ORDER — PROPOFOL 10 MG/ML
24.88 INJECTION, EMULSION INTRAVENOUS
Qty: 500 | Refills: 0 | Status: DISCONTINUED | OUTPATIENT
Start: 2022-10-07 | End: 2022-10-08

## 2022-10-07 RX ORDER — FLUCONAZOLE 150 MG/1
600 TABLET ORAL EVERY 24 HOURS
Refills: 0 | Status: DISCONTINUED | OUTPATIENT
Start: 2022-10-08 | End: 2022-10-10

## 2022-10-07 RX ORDER — DEXTROSE 50 % IN WATER 50 %
50 SYRINGE (ML) INTRAVENOUS
Refills: 0 | Status: DISCONTINUED | OUTPATIENT
Start: 2022-10-07 | End: 2022-10-10

## 2022-10-07 RX ORDER — MAGNESIUM OXIDE 400 MG ORAL TABLET 241.3 MG
400 TABLET ORAL EVERY 8 HOURS
Refills: 0 | Status: DISCONTINUED | OUTPATIENT
Start: 2022-10-07 | End: 2022-10-10

## 2022-10-07 RX ORDER — INSULIN LISPRO 100/ML
VIAL (ML) SUBCUTANEOUS EVERY 6 HOURS
Refills: 0 | Status: DISCONTINUED | OUTPATIENT
Start: 2022-10-07 | End: 2022-10-10

## 2022-10-07 RX ORDER — HUMAN INSULIN 100 [IU]/ML
14 INJECTION, SUSPENSION SUBCUTANEOUS EVERY 6 HOURS
Refills: 0 | Status: DISCONTINUED | OUTPATIENT
Start: 2022-10-07 | End: 2022-10-08

## 2022-10-07 RX ORDER — VECURONIUM BROMIDE 20 MG/1
2.5 INJECTION, POWDER, FOR SOLUTION INTRAVENOUS ONCE
Refills: 0 | Status: COMPLETED | OUTPATIENT
Start: 2022-10-07 | End: 2022-10-07

## 2022-10-07 RX ORDER — HYDROMORPHONE HYDROCHLORIDE 2 MG/ML
0.5 INJECTION INTRAMUSCULAR; INTRAVENOUS; SUBCUTANEOUS ONCE
Refills: 0 | Status: DISCONTINUED | OUTPATIENT
Start: 2022-10-07 | End: 2022-10-07

## 2022-10-07 RX ORDER — INSULIN HUMAN 100 [IU]/ML
2 INJECTION, SOLUTION SUBCUTANEOUS
Qty: 100 | Refills: 0 | Status: DISCONTINUED | OUTPATIENT
Start: 2022-10-07 | End: 2022-10-08

## 2022-10-07 RX ADMIN — Medication 250 MILLIGRAM(S): at 18:09

## 2022-10-07 RX ADMIN — Medication 1 APPLICATION(S): at 12:13

## 2022-10-07 RX ADMIN — Medication 3 MILLILITER(S): at 05:20

## 2022-10-07 RX ADMIN — MAGNESIUM OXIDE 400 MG ORAL TABLET 400 MILLIGRAM(S): 241.3 TABLET ORAL at 22:07

## 2022-10-07 RX ADMIN — Medication 125 MILLIGRAM(S): at 05:09

## 2022-10-07 RX ADMIN — Medication 0.5 MILLIGRAM(S): at 17:21

## 2022-10-07 RX ADMIN — Medication 3 MILLILITER(S): at 23:20

## 2022-10-07 RX ADMIN — Medication 20 MILLIGRAM(S): at 05:09

## 2022-10-07 RX ADMIN — Medication 4: at 18:14

## 2022-10-07 RX ADMIN — Medication 20 MILLIGRAM(S): at 14:29

## 2022-10-07 RX ADMIN — Medication 63.75 MILLIMOLE(S): at 02:49

## 2022-10-07 RX ADMIN — PANTOPRAZOLE SODIUM 40 MILLIGRAM(S): 20 TABLET, DELAYED RELEASE ORAL at 12:10

## 2022-10-07 RX ADMIN — CHLORHEXIDINE GLUCONATE 1 APPLICATION(S): 213 SOLUTION TOPICAL at 05:38

## 2022-10-07 RX ADMIN — CHLORHEXIDINE GLUCONATE 15 MILLILITER(S): 213 SOLUTION TOPICAL at 05:10

## 2022-10-07 RX ADMIN — CHLORHEXIDINE GLUCONATE 15 MILLILITER(S): 213 SOLUTION TOPICAL at 18:17

## 2022-10-07 RX ADMIN — INSULIN HUMAN 3 UNIT(S)/HR: 100 INJECTION, SOLUTION SUBCUTANEOUS at 07:50

## 2022-10-07 RX ADMIN — DEXMEDETOMIDINE HYDROCHLORIDE IN 0.9% SODIUM CHLORIDE 8.38 MICROGRAM(S)/KG/HR: 4 INJECTION INTRAVENOUS at 07:50

## 2022-10-07 RX ADMIN — HUMAN INSULIN 14 UNIT(S): 100 INJECTION, SUSPENSION SUBCUTANEOUS at 18:09

## 2022-10-07 RX ADMIN — MEXILETINE HYDROCHLORIDE 200 MILLIGRAM(S): 150 CAPSULE ORAL at 14:30

## 2022-10-07 RX ADMIN — DEXMEDETOMIDINE HYDROCHLORIDE IN 0.9% SODIUM CHLORIDE 8.38 MICROGRAM(S)/KG/HR: 4 INJECTION INTRAVENOUS at 20:04

## 2022-10-07 RX ADMIN — Medication 10 MILLIGRAM(S): at 07:49

## 2022-10-07 RX ADMIN — CASPOFUNGIN ACETATE 260 MILLIGRAM(S): 7 INJECTION, POWDER, LYOPHILIZED, FOR SOLUTION INTRAVENOUS at 07:49

## 2022-10-07 RX ADMIN — Medication 25 MILLIGRAM(S): at 18:08

## 2022-10-07 RX ADMIN — Medication 125 MILLIGRAM(S): at 18:08

## 2022-10-07 RX ADMIN — PROPOFOL 10 MICROGRAM(S)/KG/MIN: 10 INJECTION, EMULSION INTRAVENOUS at 22:28

## 2022-10-07 RX ADMIN — Medication 0.5 MILLIGRAM(S): at 05:21

## 2022-10-07 RX ADMIN — Medication 25 MILLIGRAM(S): at 05:09

## 2022-10-07 RX ADMIN — Medication 125 MICROGRAM(S): at 12:11

## 2022-10-07 RX ADMIN — Medication 25 GRAM(S): at 01:58

## 2022-10-07 RX ADMIN — Medication 3 MILLILITER(S): at 11:11

## 2022-10-07 RX ADMIN — Medication 250 MILLIGRAM(S): at 05:10

## 2022-10-07 RX ADMIN — Medication 1 TABLET(S): at 12:11

## 2022-10-07 RX ADMIN — SODIUM CHLORIDE 10 MILLILITER(S): 9 INJECTION INTRAMUSCULAR; INTRAVENOUS; SUBCUTANEOUS at 20:05

## 2022-10-07 RX ADMIN — HEPARIN SODIUM 10.5 UNIT(S)/HR: 5000 INJECTION INTRAVENOUS; SUBCUTANEOUS at 20:05

## 2022-10-07 RX ADMIN — HUMAN INSULIN 14 UNIT(S): 100 INJECTION, SUSPENSION SUBCUTANEOUS at 12:08

## 2022-10-07 RX ADMIN — MEXILETINE HYDROCHLORIDE 200 MILLIGRAM(S): 150 CAPSULE ORAL at 22:07

## 2022-10-07 RX ADMIN — MEXILETINE HYDROCHLORIDE 200 MILLIGRAM(S): 150 CAPSULE ORAL at 05:10

## 2022-10-07 RX ADMIN — Medication 3 MILLILITER(S): at 17:21

## 2022-10-07 NOTE — PROGRESS NOTE ADULT - SUBJECTIVE AND OBJECTIVE BOX
CHIEF COMPLAINT: f/up sob, chronic resp failure, TBM, severe persistent asthma, VC dysfunction, Type A aortic dissection s/p repair w/modified "Bentall procedure and hemiarch replacement 9/6-vented and sedated off nitrous-40%--no weaning done as per resp    Interval Events: sedated/vented    REVIEW OF SYSTEMS:  Constitutional: No fevers or chills. No weight loss. No fatigue or generalized malaise.  Eyes: No itching or discharge from the eyes  ENT: No ear pain. No ear discharge. No nasal congestion. No post nasal drip. No epistaxis. No throat pain. No sore throat. No difficulty swallowing.   CV: No chest pain. No palpitations. No lightheadedness or dizziness.   Resp: No dyspnea at rest. No dyspnea on exertion. No orthopnea. No wheezing. No cough. No stridor. No sputum production. No chest pain with respiration.  GI: No nausea. No vomiting. No diarrhea.  MSK: No joint pain or pain in any extremities  Integumentary: No skin lesions. No pedal edema.  Neurological: No gross motor weakness. No sensory changes.  [ ] All other systems negative  [+ ] Unable to assess ROS because _sedated_______    OBJECTIVE:  ICU Vital Signs Last 24 Hrs  T(C): 38.6 (07 Oct 2022 04:00), Max: 38.8 (07 Oct 2022 04:00)  T(F): 101.5 (07 Oct 2022 04:00), Max: 101.8 (07 Oct 2022 04:00)  HR: 90 (07 Oct 2022 04:00) (64 - 90)  BP: --  BP(mean): --  ABP: 140/56 (07 Oct 2022 04:00) (104/41 - 156/66)  ABP(mean): 81 (07 Oct 2022 04:00) (59 - 100)  RR: 27 (07 Oct 2022 04:00) (4 - 29)  SpO2: 98% (07 Oct 2022 04:00) (97% - 100%)    O2 Parameters below as of 07 Oct 2022 04:00  Patient On (Oxygen Delivery Method): ventilator    O2 Concentration (%): 40      Mode: CPAP with PS, FiO2: 40, PEEP: 5, PS: 10, MAP: 9, PIP: 17    10-05 @ 07:01  -  10-06 @ 07:00  --------------------------------------------------------  IN: 3658.3 mL / OUT: 2330 mL / NET: 1328.3 mL    10-06 @ 07:01  -  10-07 @ 04:40  --------------------------------------------------------  IN: 2507.5 mL / OUT: 2405 mL / NET: 102.5 mL      CAPILLARY BLOOD GLUCOSE  163 (06 Oct 2022 12:00)      POCT Blood Glucose.: 109 mg/dL (07 Oct 2022 01:56)      PHYSICAL EXAM:  General: Arousable on vent  HEENT: Atraumatic, normocephalic.                 Mallampatti Grade 2                No nasal congestion.                No tonsillar or pharyngeal exudates.  Lymph Nodes: No palpable lymphadenopathy  Neck: No JVD. No carotid bruit.   Respiratory: Normal chest expansion                         Normal percussion                         Normal and equal air entry                         No wheeze, rhonchi or rales.  Cardiovascular: S1 S2 normal. No murmurs, rubs or gallops.   Abdomen: Soft, non-tender, non-distended. No organomegaly. Normoactive bowel sounds.  Extremities: Warm to touch. Peripheral pulse palpable. No pedal edema.   Skin: No rashes or skin lesions  Neurological: Motor and sensory examination unable  Psychiatry:?? Appropriate mood and affect.    HOSPITAL MEDICATIONS:  MEDICATIONS  (STANDING):  albuterol/ipratropium for Nebulization 3 milliLiter(s) Nebulizer every 6 hours  buDESOnide    Inhalation Suspension 0.5 milliGRAM(s) Inhalation every 12 hours  caspofungin IVPB 50 milliGRAM(s) IV Intermittent every 24 hours  chlorhexidine 0.12% Liquid 15 milliLiter(s) Oral Mucosa every 12 hours  chlorhexidine 2% Cloths 1 Application(s) Topical <User Schedule>  collagenase Ointment 1 Application(s) Topical daily  dexMEDEtomidine Infusion 0.5 MICROgram(s)/kG/Hr (8.38 mL/Hr) IV Continuous <Continuous>  digoxin  Injectable 125 MICROGram(s) IV Push daily  furosemide    Tablet 20 milliGRAM(s) Oral two times a day  heparin  Infusion 600 Unit(s)/Hr (11 mL/Hr) IV Continuous <Continuous>  hydrocortisone sodium succinate Injectable 10 milliGRAM(s) IV Push <User Schedule>  insulin regular Infusion 3 Unit(s)/Hr (3 mL/Hr) IV Continuous <Continuous>  metoprolol tartrate 25 milliGRAM(s) Oral two times a day  mexiletine 200 milliGRAM(s) Oral every 8 hours  multivitamin 1 Tablet(s) Oral daily  pantoprazole  Injectable 40 milliGRAM(s) IV Push daily  sodium chloride 0.9%. 1000 milliLiter(s) (50 mL/Hr) IV Continuous <Continuous>  vancomycin    Solution 125 milliGRAM(s) Oral every 12 hours  vancomycin  IVPB 1000 milliGRAM(s) IV Intermittent every 12 hours  vancomycin  IVPB        MEDICATIONS  (PRN):  simethicone drops 80 milliGRAM(s) Oral every 12 hours PRN Gas      LABS:                        8.2    18.32 )-----------( 152      ( 07 Oct 2022 00:36 )             27.5     10-07    142  |  109<H>  |  20  ----------------------------<  113<H>  4.3   |  27  |  0.47<L>    Ca    8.0<L>      07 Oct 2022 00:36  Phos  2.3     10-07  Mg     1.8     10-07    TPro  5.2<L>  /  Alb  2.5<L>  /  TBili  0.5  /  DBili  x   /  AST  31  /  ALT  112<H>  /  AlkPhos  168<H>  10-07    PTT - ( 07 Oct 2022 00:36 )  PTT:47.0 sec    Arterial Blood Gas:  10-07 @ 00:19  7.45/40/119/28/97.8/3.5  ABG lactate: --  Arterial Blood Gas:  10-06 @ 01:30  7.51/33/116/26/99.1/3.3  ABG lactate: --  Arterial Blood Gas:  10-05 @ 10:15  7.43/41/81/27/96.9/2.6  ABG lactate: --  Arterial Blood Gas:  10-05 @ 06:35  7.43/39/77/26/96.3/1.5  ABG lactate: --        MICROBIOLOGY:     RADIOLOGY:  [ ] Reviewed and interpreted by me    Point of Care Ultrasound Findings:    PFT:    EKG:

## 2022-10-07 NOTE — PROGRESS NOTE ADULT - NSPROGADDITIONALINFOA_GEN_ALL_CORE
-Plan discussed with pt/team.  Contact info: [phone] (24/7). pager [phone]  [email or web address] password [email or web address]  Teams  Spent  28 minutes assessing pt/labs/meds and discussing plan of care with primary team  Adjusting insulin  Discharge plan  Follow up care -Plan discussed with pt/team.  Contact info: [phone] (24/7). pager [phone]  [email or web address] password [email or web address]  Teams  Spent 28 minutes assessing pt/labs/meds and discussing plan of care with primary team  Adjusting insulin  Discharge plan  Follow up care

## 2022-10-07 NOTE — PROGRESS NOTE ADULT - SUBJECTIVE AND OBJECTIVE BOX
INFECTIOUS DISEASES FOLLOW UP-- Fouzia Calixto  [phone]    This is a follow up note for this  72yFemale with  Dissection of thoracic aorta  MRSA and Candidemia        ROS:  CONSTITUTIONAL:  No fever, good appetite  CARDIOVASCULAR:  No chest pain or palpitations  RESPIRATORY:  No dyspnea  GASTROINTESTINAL:  No nausea, vomiting, diarrhea, or abdominal pain  GENITOURINARY:  No dysuria  NEUROLOGIC:  No headache,     Allergies    aspirin (Short breath)  Avelox (Short breath; Pruritus)  cefepime (Anaphylaxis)  codeine (Short breath)  Dilaudid (Short breath)  iodine (Short breath; Swelling)  penicillin (Anaphylaxis)  shellfish (Anaphylaxis)  tetanus toxoid (Short breath)  Valium (Short breath)    Intolerances        ANTIBIOTICS/RELEVANT:  antimicrobials  vancomycin    Solution 125 milliGRAM(s) Oral every 12 hours  vancomycin  IVPB 1000 milliGRAM(s) IV Intermittent every 12 hours  vancomycin  IVPB        immunologic:    OTHER:  albuterol/ipratropium for Nebulization 3 milliLiter(s) Nebulizer every 6 hours  buDESOnide    Inhalation Suspension 0.5 milliGRAM(s) Inhalation every 12 hours  chlorhexidine 0.12% Liquid 15 milliLiter(s) Oral Mucosa every 12 hours  chlorhexidine 2% Cloths 1 Application(s) Topical <User Schedule>  collagenase Ointment 1 Application(s) Topical daily  dexMEDEtomidine Infusion 0.5 MICROgram(s)/kG/Hr IV Continuous <Continuous>  digoxin  Injectable 125 MICROGram(s) IV Push daily  furosemide    Tablet 20 milliGRAM(s) Oral two times a day  heparin  Infusion 600 Unit(s)/Hr IV Continuous <Continuous>  insulin lispro (ADMELOG) corrective regimen sliding scale   SubCutaneous every 6 hours  insulin NPH human recombinant 14 Unit(s) SubCutaneous every 6 hours  insulin regular Infusion 3 Unit(s)/Hr IV Continuous <Continuous>  magnesium oxide 400 milliGRAM(s) Oral every 8 hours  metoprolol tartrate 25 milliGRAM(s) Oral two times a day  mexiletine 200 milliGRAM(s) Oral every 8 hours  multivitamin 1 Tablet(s) Oral daily  pantoprazole  Injectable 40 milliGRAM(s) IV Push daily  simethicone drops 80 milliGRAM(s) Oral every 12 hours PRN  sodium chloride 0.9%. 1000 milliLiter(s) IV Continuous <Continuous>      Objective:  Vital Signs Last 24 Hrs  T(C): 38 (07 Oct 2022 18:00), Max: 38.8 (07 Oct 2022 04:00)  T(F): 100.4 (07 Oct 2022 18:00), Max: 101.8 (07 Oct 2022 04:00)  HR: 71 (07 Oct 2022 18:00) (64 - 90)  BP: 102/42 (07 Oct 2022 17:00) (101/48 - 153/73)  BP(mean): 60 (07 Oct 2022 17:00) (60 - 100)  RR: 22 (07 Oct 2022 18:00) (4 - 29)  SpO2: 100% (07 Oct 2022 18:00) (96% - 100%)    Parameters below as of 07 Oct 2022 16:00  Patient On (Oxygen Delivery Method): ventilator    O2 Concentration (%): 40    PHYSICAL EXAM:  Constitutional:no acute distress, awake  Eyes:EBER, EOMI  Ear/Nose/Throat: no oral lesions, intubated	  Respiratory: clear BL  sternotomy site no drainage  Cardiovascular: S1S2  Gastrointestinal:soft, (+) BS, no tenderness  Extremities:no e/e/c  No Lymphadenopathy  IV sites not inflammed.    LABS:                        8.2    18.32 )-----------( 152      ( 07 Oct 2022 00:36 )             27.5     10-07    142  |  109<H>  |  20  ----------------------------<  113<H>  4.3   |  27  |  0.47<L>    Ca    8.0<L>      07 Oct 2022 00:36  Phos  2.3     10-07  Mg     1.8     10-07    TPro  5.2<L>  /  Alb  2.5<L>  /  TBili  0.5  /  DBili  x   /  AST  31  /  ALT  112<H>  /  AlkPhos  168<H>  10-07    PTT - ( 07 Oct 2022 16:27 )  PTT:56.3 sec      MICROBIOLOGY:            RECENT CULTURES:  10-05 @ 05:00  .Blood Blood  --  --  --    No growth to date.  --  10-05 @ 03:55  .Blood Blood  --  --  --    No growth to date.  --  10-04 @ 17:19  .Sputum Sputum  Methicillin resistant Staphylococcus aureus  Methicillin resistant Staphylococcus aureus  GARRETT    Moderate Methicillin Resistant Staphylococcus aureus  Normal Respiratory Jessica absent  --  10-01 @ 18:55  .Surgical Swab Port Device  --  --  --    No growth  --  10-01 @ 01:47  .Blood Blood-Peripheral  --  --  --    No Growth Final  --  10-01 @ 01:24  .Blood Blood-Peripheral  --  --  --    No Growth Final  --    Culture - Blood (09.30.22 @ 11:45)    Gram Stain:   Growth in aerobic bottle: Yeast like cells    -  Amphotericin B: N/I 2    -  Anidulafungin: S 0.03    -  Caspofungin: S 0.06 CASPOFUNGIN: is indicated in the treatment of candidemia, intra-abdominal abscess, peritonitis, pleural space infections and esophageal candidiasis.    -  Fluconazole: SDD 2 Fluconazole = Data established for mucosal disease, and is generally applicable for invasive disease.  Susceptibility is dependent on achieving the maximal possible blood level.  Doses of 400 MG/day or more may be required in adults with normalrenalfunction and body habitus.    This test was developed and its performance characteristics determined by [facility], [place]  It has not been cleared or approved by the U.S. Food and Drug Administration. S - Susceptible; SDD - Susceptible Dose Dependent; I - Intermediate; R - Resistant; N/I - No Interpretation Available    -  Micafungin: S 0.015    -  Posaconazole: N/I 0.25    -  Voriconazole: N/I 0.06 For Candida glabrata and voriconazole , current data are insufficient to demonstrate a correlation between in vitro susceptibility testing and clinical outcome.    VORICONAZOLE: The GARRETT has been determined by the colormetric microdilution method.    -  Candida glabrata: Detec    Specimen Source: .Blood Blood    Organism: Blood Culture PCR    Organism: Candida (Torulopsis) glabrata    Culture Results:   Growth in aerobic bottle: Candida glabrata  ***Blood Panel PCR results on this specimen are available  approximately 3 hours after the Gram stain result.***  Gram stain, PCR, and/or culture results may not always  correspond due to difference in methodologies.  ************************************************************  This PCR assay was performed by multiplex PCR. This  Assay tests for 66 bacterial and resistance gene targets.  Please refer to the [facility] test directory  at [email or web address] for details.    Organism Identification: Blood Culture PCR  Candida (Torulopsis) glabrata    Method Type: PCR    Method Type: YSTMIC      RADIOLOGY & ADDITIONAL STUDIES:    < from: Xray Chest 1 View- PORTABLE-Routine (Xray Chest 1 View- PORTABLE-Routine in AM.) (10.07.22 @ 02:26) >    IMPRESSION:  Trace bilateral pleural effusions with associated bibasilar atelectasis.    < end of copied text >

## 2022-10-07 NOTE — PROGRESS NOTE ADULT - ASSESSMENT
71F w/h/o uncontrolled T2DM (A1C 9.4%) on basal/bolus insulin PTA. Unknown DM complications. Also COPD, secondary adrenal Insufficiency on chronic steroids, colorectal cancer s/p resection (colostomy bag), Chronic A fib on Eliquis, and tracheomalacia and multiple intubations, recent dx of OM presented to ED at [facility] with epigastric pain, belching and central chest pain. Found on CTA to have type A aortic dissection and transferred to [facility] for surgical evaluation by Dr. Cabrera. Now s/p aortic dissection repair on 9/07/22. Endocrine consulted for assistance with uncontrolled DM/steroids for AI. Pt in ICU with ventricular dysfunction/sepsis intubated, off pressors and tolerating TFs on insulin drip to keep BG goal 100 [id number]. Pt remains with leukocytosis and improving transaminitis. Spoke to team to switch to SQ insulin since pt is more stable now and requiring lower insulin doses at this time.       On stress steroid doses tapering down. Last dose today> starting chronic Prednisone dose 8mg tomorrow

## 2022-10-07 NOTE — PROGRESS NOTE ADULT - NUTRITIONAL ASSESSMENT
Diet, NPO with Tube Feed:   Tube Feeding Modality: Nasogastric  Glucerna 1.5 Chago (GLUCERNA1.5RTH)  Total Volume for 24 Hours (mL): 1320  Continuous  Starting Tube Feed Rate {mL per Hour}: 55  Until Goal Tube Feed Rate (mL per Hour): 55  Tube Feed Duration (in Hours): 24  Tube Feed Start Time: 10:00 (10-04-22 @ 13:03) [Active]    Please see RD assessment and/or follow up.  Managed by primary team as well

## 2022-10-07 NOTE — PROGRESS NOTE ADULT - SUBJECTIVE AND OBJECTIVE BOX
DIABETES FOLLOW UP NOTE: Saw pt earlier today    Chief Complaint: Endocrine consult requested for management of T2DM    INTERVAL HX: Saw pt in CTU, intubated, off pressors and per staff and team tolerating TFs of Glucerna at 55 cc/hr. BG mostly at goal while on insulin drip requiring average of 2.5 to 3.5 units/hr. No hypoglycemia. On antibiotic for sepsis with improving transaminitis. Tapering HCT dose, will be back on Prednisone 8mg daily tomorrow. Pt awake and able to nod  yes and no to questions.     Review of Systems:  General: As above  Cardiovascular: No chest pain, palpitations  Respiratory: No SOB, no cough  GI: No nausea, vomiting, abdominal pain  Endocrine: No S&Sx of hypoglycemia    Allergies    aspirin (Short breath)  Avelox (Short breath; Pruritus)  cefepime (Anaphylaxis)  codeine (Short breath)  Dilaudid (Short breath)  iodine (Short breath; Swelling)  penicillin (Anaphylaxis)  shellfish (Anaphylaxis)  tetanus toxoid (Short breath)  Valium (Short breath)    Intolerances      MEDICATIONS    caspofungin IVPB 50 milliGRAM(s) IV Intermittent every 24 hours  insulin lispro (ADMELOG) corrective regimen sliding scale   SubCutaneous every 6 hours  insulin NPH human recombinant 14 Unit(s) SubCutaneous every 6 hours  insulin regular Infusion 3 Unit(s)/Hr (3 mL/Hr) IV Continuous <Continuous>  vancomycin    Solution 125 milliGRAM(s) Oral every 12 hours  vancomycin  IVPB 1000 milliGRAM(s) IV Intermittent every 12 hours      PHYSICAL EXAM:  VITALS: T(C): 38.2 (10-07-22 @ 10:00)  T(F): 100.8 (10-07-22 @ 10:00), Max: 101.8 (10-07-22 @ 04:00)  HR: 69 (10-07-22 @ 11:11) (64 - 90)  BP: 104/53 (10-07-22 @ 11:00) (101/48 - 110/53)  RR:  (4 - 29)  SpO2:  (97% - 100%)  Wt(kg): --  GENERAL: Female laying in bed in NAD.  HEENT: Intubated. NGT with TFs on at 55cc/hr  Chest: Sterna incision healed  Abdomen: Soft, nontender, non distended  Extremities: Warm, venodynes on. + edema UEs.   NEURO: Alert limited interaction during encounter. Able to nod to some questions    LABS:  POCT Blood Glucose.: 141 mg/dL (10-07-22 @ 12:06)  POCT Blood Glucose.: 134 mg/dL (10-07-22 @ 11:03)  POCT Blood Glucose.: 142 mg/dL (10-07-22 @ 10:19)  POCT Blood Glucose.: 129 mg/dL (10-07-22 @ 09:52)  POCT Blood Glucose.: 130 mg/dL (10-07-22 @ 09:14)  POCT Blood Glucose.: 121 mg/dL (10-07-22 @ 07:56)  POCT Blood Glucose.: 133 mg/dL (10-07-22 @ 05:26)  POCT Blood Glucose.: 109 mg/dL (10-07-22 @ 01:56)  POCT Blood Glucose.: 107 mg/dL (10-07-22 @ 00:18)  POCT Blood Glucose.: 111 mg/dL (10-06-22 @ 22:41)  POCT Blood Glucose.: 129 mg/dL (10-06-22 @ 20:49)  POCT Blood Glucose.: 142 mg/dL (10-06-22 @ 18:03)  POCT Blood Glucose.: 136 mg/dL (10-06-22 @ 16:03)  POCT Blood Glucose.: 132 mg/dL (10-06-22 @ 13:59)  POCT Blood Glucose.: 144 mg/dL (10-06-22 @ 13:04)  POCT Blood Glucose.: 163 mg/dL (10-06-22 @ 11:58)  POCT Blood Glucose.: 122 mg/dL (10-06-22 @ 09:53)  POCT Blood Glucose.: 107 mg/dL (10-06-22 @ 08:49)  POCT Blood Glucose.: 142 mg/dL (10-06-22 @ 07:57)  POCT Blood Glucose.: 138 mg/dL (10-06-22 @ 07:17)  POCT Blood Glucose.: 136 mg/dL (10-06-22 @ 06:09)  POCT Blood Glucose.: 145 mg/dL (10-06-22 @ 05:18)  POCT Blood Glucose.: 145 mg/dL (10-06-22 @ 04:19)  POCT Blood Glucose.: 138 mg/dL (10-06-22 @ 03:14)  POCT Blood Glucose.: 152 mg/dL (10-06-22 @ 02:13)  POCT Blood Glucose.: 131 mg/dL (10-06-22 @ 01:10)  POCT Blood Glucose.: 139 mg/dL (10-06-22 @ 00:15)                            8.2    18.32 )-----------( 152      ( 07 Oct 2022 00:36 )             27.5       10-07    142  |  109<H>  |  20  ----------------------------<  113<H>  4.3   |  27  |  0.47<L>    eGFR: 100    Ca    8.0<L>      10-07  Mg     1.8     10-07  Phos  2.3     10-07    TPro  5.2<L>  /  Alb  2.5<L>  /  TBili  0.5  /  DBili  x   /  AST  31  /  ALT  112<H>  /  AlkPhos  168<H>  10-07      Thyroid Function Tests:  10-03 @ 04:47 TSH 0.32 FreeT4 -- T3 -- Anti TPO -- Anti Thyroglobulin Ab -- TSI --      A1C with Estimated Average Glucose Result: 9.4 % (09-06-22 @ 16:46)  A1C with Estimated Average Glucose Result: 9.2 % (07-11-22 @ 07:36)      Estimated Average Glucose: 223 mg/dL (09-06-22 @ 16:46)  Estimated Average Glucose: 217 mg/dL (07-11-22 @ 07:36)

## 2022-10-07 NOTE — PROGRESS NOTE ADULT - PROBLEM SELECTOR PLAN 2
On chronic steroids at home: medrol 8mg qd   titrated by CT team to HCT 50mg q8h on  9/29 and 100mg q8h on 9/30 and back to 50mg q8h today  Since pt is now more stable and off pressors consider start slow taper back to oral Prednisone chronic dose.  suggested taper   TBD daily depending on pts hemodynamic stability-  Received HCT 10mg today  Start Prednisone 8mg qd 10/8  Will follow up pt status

## 2022-10-07 NOTE — PROGRESS NOTE ADULT - ASSESSMENT
71 year-old female with a history of DM2, CAD, A-Fib on apixaban, Severe Persistent Asthma (on chronic steroids, recently started on Tezspire), colon cancer s/p resection/chemo, and tracheobronchomalacia s/p tracheoplasty, and recent OM of the R foot s/b debridement and completed course of Vancomycin/Ertapenem for MRSA/ESBL Proteus/Corynebacterium who now presents with chest pain, found to have Type A dissection s/p repair with modified Bentall procedure and hemiarch replacement on 22. Post-op course complicated by acute hypoxemic respiratory failure, shock, anemia, and hyperglycemia requiring insulin gtt. Extubated , now awake and alert with mild encephalopathy but overall significantly improved.    Assessment:  Acute hypoxemic respiratory failure requiring intubation  Type A Aortic Dissection  Severe Persistent Asthma  History of Tracheobronchomalacia    Plan:  See below:  -**************************                                SEE Below:  -improving but weakness  9/15-ABX adjusted to ceftriaxone (LFTS)-PICU  -PICU, low grade temp-fever work up in progress, no resp decline or sx  -resp stable at present but tenuous  -for FEESST today, NGT in place w/TF  -s/p FEESST-passed, remains in PICU          -TF in place at 40cc, more OOB          -hypoglycemia noted and rx, no change in resp status  -vented/sedated-pressors/inotropes/ABX  -remains vented on nitrous/less O2 needs, improving LFTS                   -vented/semi sedated--less O2 needs  10/3-on vanco, weaning sedation/, all lines changed  10/6-no changes-now afebrile-on vanco   10/7-no weaning-remains off pressors

## 2022-10-07 NOTE — PROGRESS NOTE ADULT - PROBLEM SELECTOR PLAN 1
-BG Goal 100-180mg/dl   -test BG hourly while on insulin gtt. Once off gtt can check q6h if NPO/TF and AC/HS/2AM daily once eating again  -Start NPH 14 units q 4h while on TFs. HOLD IF TFS ARE OFF.  -C/w Insulin drip for 2 hours after 1st NPH insulin dose.   -If BG are difficult to control while on NPH can restart insulin drip. However, if hyperglycemci consider increasing NPH by 20% before starting insulin drip again.   Discharge plan:  - Likely to discharge patient home on basal/bolus insulin. Final regimen pending clinical course.  - Recommend routine outpatient ophthalmology, podiatry  - Can f/u with endocrinologist Dr. Saavedra.  - Make sure pt has Rx for all DM supplies and insulin/ DM meds.  -Based on pt's clinical condition and mental status at this time she is not able to independently manage her DM. Will evaluate pt's ability to manage DM at time of discharge. If unable> pt will need family/ care giver (s) to manage DM care at home  -Will need rehab. -BG Goal 100-180mg/dl   -test BG hourly while on insulin gtt. Once off gtt can check q6h if NPO/TF and AC/HS/2AM daily once eating again  -Start NPH 14 units q 4h while on TFs. HOLD IF TFS ARE OFF.  -Start Admelog moderate correction scale q6h  -C/w Insulin drip for 2 hours after 1st NPH insulin dose.   -If BG are difficult to control while on NPH can restart insulin drip. However, if hyperglycemic consider increasing NPH by 20% before starting insulin drip again.   Discharge plan:  - Likely to discharge patient home on basal/bolus insulin. Final regimen pending clinical course.  - Recommend routine outpatient ophthalmology, podiatry  - Can f/u with endocrinologist Dr. Saavedra.  - Make sure pt has Rx for all DM supplies and insulin/ DM meds.  -Based on pt's clinical condition and mental status at this time she is not able to independently manage her DM. Will evaluate pt's ability to manage DM at time of discharge. If unable> pt will need family/ care giver (s) to manage DM care at home  -Will need rehab. -test BG hourly while on insulin gtt. Once off gtt can check q6h if NPO/TF and AC/HS/2AM daily once eating again  -Start NPH 14 units q 6h while on TFs. HOLD IF TFS ARE OFF.  -Start Admelog moderate correction scale q6h  -C/w Insulin drip for 2 hours after 1st NPH insulin dose> then d/c.   -If BGs are difficult to control while on NPH can restart insulin drip. However, if hyperglycemic consider increasing NPH by 20% before starting insulin drip again.   Discharge plan:  - Likely to discharge patient home on basal/bolus insulin. Final regimen pending clinical course.  - Recommend routine outpatient ophthalmology, podiatry  - Can f/u with endocrinologist Dr. Saavedra.  - Make sure pt has Rx for all DM supplies and insulin/ DM meds.  -Based on pt's clinical condition and mental status at this time she is not able to independently manage her DM. Will evaluate pt's ability to manage DM at time of discharge. If unable> pt will need family/ care giver (s) to manage DM care at home  -Will need rehab.

## 2022-10-07 NOTE — PROGRESS NOTE ADULT - SUBJECTIVE AND OBJECTIVE BOX
CRITICAL CARE ATTENDING - CTICU    MEDICATIONS  (STANDING):  albuterol/ipratropium for Nebulization 3 milliLiter(s) Nebulizer every 6 hours  buDESOnide    Inhalation Suspension 0.5 milliGRAM(s) Inhalation every 12 hours  caspofungin IVPB 50 milliGRAM(s) IV Intermittent every 24 hours  chlorhexidine 0.12% Liquid 15 milliLiter(s) Oral Mucosa every 12 hours  chlorhexidine 2% Cloths 1 Application(s) Topical <User Schedule>  collagenase Ointment 1 Application(s) Topical daily  dexMEDEtomidine Infusion 0.5 MICROgram(s)/kG/Hr (8.38 mL/Hr) IV Continuous <Continuous>  digoxin  Injectable 125 MICROGram(s) IV Push daily  furosemide    Tablet 20 milliGRAM(s) Oral two times a day  heparin  Infusion 600 Unit(s)/Hr (11 mL/Hr) IV Continuous <Continuous>  hydrocortisone sodium succinate Injectable 10 milliGRAM(s) IV Push <User Schedule>  insulin regular Infusion 3 Unit(s)/Hr (3 mL/Hr) IV Continuous <Continuous>  metoprolol tartrate 25 milliGRAM(s) Oral two times a day  mexiletine 200 milliGRAM(s) Oral every 8 hours  multivitamin 1 Tablet(s) Oral daily  pantoprazole  Injectable 40 milliGRAM(s) IV Push daily  sodium chloride 0.9%. 1000 milliLiter(s) (50 mL/Hr) IV Continuous <Continuous>  vancomycin    Solution 125 milliGRAM(s) Oral every 12 hours  vancomycin  IVPB 1000 milliGRAM(s) IV Intermittent every 12 hours  vancomycin  IVPB                                        8.2    18.32 )-----------( 152      ( 07 Oct 2022 00:36 )             27.5       10-07    142  |  109<H>  |  20  ----------------------------<  113<H>  4.3   |  27  |  0.47<L>    Ca    8.0<L>      07 Oct 2022 00:36  Phos  2.3     10-07  Mg     1.8     10-07    TPro  5.2<L>  /  Alb  2.5<L>  /  TBili  0.5  /  DBili  x   /  AST  31  /  ALT  112<H>  /  AlkPhos  168<H>  10-07      PTT - ( 07 Oct 2022 00:36 )  PTT:47.0 sec    Mode: CPAP with PS  FiO2: 40  PEEP: 5  PS: 10  MAP: 9  PIP: 17      Daily     Daily Weight in k.3 (07 Oct 2022 00:00)      10-05 @ 07:  -  10-06 @ 07:00  --------------------------------------------------------  IN: 3658.3 mL / OUT: 2330 mL / NET: 1328.3 mL    10-06 @ 07:  -  10-07 @ 06:35  --------------------------------------------------------  IN: 3068.5 mL / OUT: 3130 mL / NET: -61.5 mL      Critically Ill patient  : [ ] preoperative ,   [x ] post operative    Requires :  [x ] Arterial Line   [x ] Central Line  [ ] PA catheter  [ ] IABP  [ ] ECMO  [ ] LVAD  [ x] Ventilator [ ] pacemaker [ ] Impella.                      [x ] ABG's     [ x] Pulse Oxymetry Monitoring  Bedside evaluation , monitoring , treatment of hemodynamics , fluids , IVP/ IVCD meds.        Diagnosis:      - Re Admit to CTICU - Septic Shock  / Sepsis     Type A dissection repair, Modified bentall and hemiarch     Hypovolemia      Respiratory Failure       Requires chest PT, pulmonary toilet, ambu bagging, suctioning to maintain SaO2,  patent airway and treat atelectasis.     Difficult weaning process - multiple organ system involvement in critically ill patient     CHF- acute [ ]   chronic [ x]    systolic [ ]   diastolic [x ]  Valvular [ ]          - Echo- EF -             [ ] RV dysfunction          - Cxr-cardiomegally, edema          - Clinical-  [ ]inotropes   [ ]pressors   [x ]diuresis   [ ]IABP   [ ]ECMO   [ ]LVAD   [ x]Respiratory Failure        -     Ventilator Management:  [x]AC-rest -   PC    [ x]CPAP-PS Wean ?   [ ]Trach Collar     [ ]Extubate    [ ] T-Piece  [x ]peep>5    Hemodynamic lability,  instability. Requires IVCD [ ] vasopressors [ ]  inotropes  [ ] vasodilator  [x ]IVSS fluid  to maintain MAP, perfusion, C.I.      DM- IVCD Insulin      Vent synchrony - IVCD Precedex      Metabolic Acidosis    Hypocalcemia      COPD     Colostomy - colon CA     Renal Failure - Acute Kidney Injury     ? 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 All medical record entries made by the scribe were at my direction and in my presence. I have reviewed the chart and agree that the record reflects my personal performance and is accurate and complete.   Bon Jiménez MD.       By signing my name below, I, Megan Guerra, attest that this documentation has been prepared under the direction and in the presence of Bon Jiménez MD.   Electronically Signed: Megan Guerra Scribe 10-07-22 @ 06:35        Discussed with CT surgeon, Physician Assistant - Nurse Practitioner- Critical care medicine team.   Dicussed at  AM / PM rounds.   Chart, labs , films reviewed.    Cumulative Critical Care Time Given Today:  CRITICAL CARE ATTENDING - CTICU    MEDICATIONS  (STANDING):  albuterol/ipratropium for Nebulization 3 milliLiter(s) Nebulizer every 6 hours  buDESOnide    Inhalation Suspension 0.5 milliGRAM(s) Inhalation every 12 hours  caspofungin IVPB 50 milliGRAM(s) IV Intermittent every 24 hours  chlorhexidine 0.12% Liquid 15 milliLiter(s) Oral Mucosa every 12 hours  chlorhexidine 2% Cloths 1 Application(s) Topical <User Schedule>  collagenase Ointment 1 Application(s) Topical daily  dexMEDEtomidine Infusion 0.5 MICROgram(s)/kG/Hr (8.38 mL/Hr) IV Continuous <Continuous>  digoxin  Injectable 125 MICROGram(s) IV Push daily  furosemide    Tablet 20 milliGRAM(s) Oral two times a day  heparin  Infusion 600 Unit(s)/Hr (11 mL/Hr) IV Continuous <Continuous>  hydrocortisone sodium succinate Injectable 10 milliGRAM(s) IV Push <User Schedule>  insulin regular Infusion 3 Unit(s)/Hr (3 mL/Hr) IV Continuous <Continuous>  metoprolol tartrate 25 milliGRAM(s) Oral two times a day  mexiletine 200 milliGRAM(s) Oral every 8 hours  multivitamin 1 Tablet(s) Oral daily  pantoprazole  Injectable 40 milliGRAM(s) IV Push daily  sodium chloride 0.9%. 1000 milliLiter(s) (50 mL/Hr) IV Continuous <Continuous>  vancomycin    Solution 125 milliGRAM(s) Oral every 12 hours  vancomycin  IVPB 1000 milliGRAM(s) IV Intermittent every 12 hours  vancomycin  IVPB                                        8.2    18.32 )-----------( 152      ( 07 Oct 2022 00:36 )             27.5       10-07    142  |  109<H>  |  20  ----------------------------<  113<H>  4.3   |  27  |  0.47<L>    Ca    8.0<L>      07 Oct 2022 00:36  Phos  2.3     10-07  Mg     1.8     10-07    TPro  5.2<L>  /  Alb  2.5<L>  /  TBili  0.5  /  DBili  x   /  AST  31  /  ALT  112<H>  /  AlkPhos  168<H>  10-07      PTT - ( 07 Oct 2022 00:36 )  PTT:47.0 sec    Mode: CPAP with PS  FiO2: 40  PEEP: 5  PS: 10  MAP: 9  PIP: 17      Daily     Daily Weight in k.3 (07 Oct 2022 00:00)      10-05 @ 07:  -  10-06 @ 07:00  --------------------------------------------------------  IN: 3658.3 mL / OUT: 2330 mL / NET: 1328.3 mL    10-06 @ 07:  -  10-07 @ 06:35  --------------------------------------------------------  IN: 3068.5 mL / OUT: 3130 mL / NET: -61.5 mL      Critically Ill patient  : [ ] preoperative ,   [x ] post operative    Requires :  [x ] Arterial Line   [x ] Central Line  [ ] PA catheter  [ ] IABP  [ ] ECMO  [ ] LVAD  [ x] Ventilator [ ] pacemaker [ ] Impella.                      [x ] ABG's     [ x] Pulse Oxymetry Monitoring  Bedside evaluation , monitoring , treatment of hemodynamics , fluids , IVP/ IVCD meds.        Diagnosis:      - Re Admit to CTICU - Septic Shock  / Sepsis     Type A dissection repair, Modified bentall and hemiarch     Hypovolemia      Respiratory Failure       Requires chest PT, pulmonary toilet, ambu bagging, suctioning to maintain SaO2,  patent airway and treat atelectasis.     Difficult weaning process - multiple organ system involvement in critically ill patient     For Tracheostomy     CHF- acute [ ]   chronic [ x]    systolic [ ]   diastolic [x ]  Valvular [ ]          - Echo- EF -             [ ] RV dysfunction          - Cxr-cardiomegally, edema          - Clinical-  [ ]inotropes   [ ]pressors   [x ]diuresis   [ ]IABP   [ ]ECMO   [ ]LVAD   [ x]Respiratory Failure        -     Ventilator Management:  [x]AC-rest -   PC    [ x]CPAP-PS Wean ?   [ ]Trach Collar     [ ]Extubate    [ ] T-Piece  [x ]peep>5    Hemodynamic lability,  instability. Requires IVCD [ ] vasopressors [ ]  inotropes  [ x] vasodilator  [x ]IVSS fluid  to maintain MAP, perfusion, C.I.      DM- IVCD Insulin      Vent synchrony - IVCD Precedex      Hypocalcemia      COPD     Colostomy - colon CA     Renal Failure - Acute Kidney Injury     ? 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 All medical record entries made by the scribe were at my direction and in my presence. I have reviewed the chart and agree that the record reflects my personal performance and is accurate and complete.   Bon Jiménez MD.       By signing my name below, I, Megan Guerra, attest that this documentation has been prepared under the direction and in the presence of Bon Jiménez MD.   Electronically Signed: Megan Guerra Scribe 10-07-22 @ 06:35        Discussed with CT surgeon, Physician Assistant - Nurse Practitioner- Critical care medicine team.   Dicussed at  AM / PM rounds.   Chart, labs , films reviewed.    Cumulative Critical Care Time Given Today:  30 min

## 2022-10-07 NOTE — PROGRESS NOTE ADULT - SUBJECTIVE AND OBJECTIVE BOX
Patient seen and examined at the bedside.    Remained critically ill on continuous ICU monitoring.    HPI:  71F PMH DM, COPD, Chronic Adrenal Insufficiency on Chronic prednisone, history of colorectal cancer s/p resection (colostomy bag), Hx of CAD, Chronic A fib on Eliquis, and tracheomalacia and multiple intubations, recent dx of OM presented to ED at [facility] this morning with epigastric pain, belching and central chest pain. Found on CTA to have type A aortic dissection and transferred to [facility] for surgical evaluation by Dr. Cabrera. (06 Sep 2022 16:32)      =================== LABS =========================                        8.2    18.32 )-----------( 152      ( 07 Oct 2022 00:36 )             27.5     10-07    142  |  109<H>  |  20  ----------------------------<  113<H>  4.3   |  27  |  0.47<L>    Ca    8.0<L>      07 Oct 2022 00:36  Phos  2.3     10-07  Mg     1.8     10-07    TPro  5.2<L>  /  Alb  2.5<L>  /  TBili  0.5  /  DBili  x   /  AST  31  /  ALT  112<H>  /  AlkPhos  168<H>  10-07    LIVER FUNCTIONS - ( 07 Oct 2022 00:36 )  Alb: 2.5 g/dL / Pro: 5.2 g/dL / ALK PHOS: 168 U/L / ALT: 112 U/L / AST: 31 U/L / GGT: x           PTT - ( 07 Oct 2022 16:27 )  PTT:56.3 sec  ABG - ( 07 Oct 2022 17:53 )  pH, Arterial: 7.45  pH, Blood: x     /  pCO2: 43    /  pO2: 89    / HCO3: 30    / Base Excess: 5.4   /  SaO2: 97.0                  ==================================================    OBJECTIVE:  Vital Signs Last 24 Hrs  T(C): 37.9 (07 Oct 2022 20:00), Max: 38.8 (07 Oct 2022 04:00)  T(F): 100.2 (07 Oct 2022 20:00), Max: 101.8 (07 Oct 2022 04:00)  HR: 69 (07 Oct 2022 22:00) (66 - 90)  BP: 40/18 (07 Oct 2022 20:00) (40/18 - 153/73)  BP(mean): 24 (07 Oct 2022 20:00) (24 - 100)  RR: 21 (07 Oct 2022 22:00) (4 - 29)  SpO2: 100% (07 Oct 2022 22:00) (96% - 100%)    Parameters below as of 07 Oct 2022 20:00  Patient On (Oxygen Delivery Method): ventilator    O2 Concentration (%): 40    REVIEW OF SYSTEMS:  [x ] N/A    Physical Exam:   General: intubated  Neurology: somnolent, but able to follow simple commands  ENT/Neck: Neck supple, trachea midline, No JVD  Respiratory: rhonchi throughout    CV: S1S2, no murmurs        [x] Sinus Rhythm   Abdominal: Soft, NT, ND, colostomy in place  Extremities: +4 RUE edema, 3+LUE edema, 2 + pedal edema noted, + peripheral pulses   Skin: Dry dressing over right heel, no Rashes, Hematoma, Ecchymosis         Assessment:  71F PMH DM, COPD, Chronic Adrenal Insufficiency on Chronic prednisone, history of colorectal cancer s/p resection (colostomy bag), Hx of CAD, Chronic A fib on Eliquis, and tracheomalacia and multiple intubations, recent dx of OM presented to ED at [facility] this morning with epigastric pain, belching and central chest pain. Found on CTA to have type A aortic dissection and transferred to [facility] for surgical evaluation by Dr. Cabrera.     Ascending aortic dissection s/p type A dissection repair and biobentall on 9/7   Hypovolemia   Post op respiratory failure   Acute blood loss anemia  Stress hyperglycemia   RIJ thrombus  MRSA PNA    Plan:   ***Neuro***  [x] Sedated with [x] Precedex [x] Diprivan   Post operative neuro assessment     ***Cardiovascular***  Limited TTE on 10/1: EF 69%, Hyperdynamic left ventricular systolic function. There is hypokinesis of the mid-base inferior wall. Nml RV fxn.   CT chest on 9/28: No CT evidence of pulmonary embolism.Status post repair of ascending aortic dissection with a stable dissection flap originating from the aortic arch and extending into the infrarenal abdominal aorta,  B/l UE duplex on 10/5: As before, there is an occluded RIGHT IJ vein with thrombosis extending to brachiocephalic vein. Superficial thrombophlebitis of the LEFT cephalic vein.    Continuous reassessment of hemodynamics / plan to change lines this PM   SR/ CVP 2 / MAP 85 / Hct 27.5%/ Lactate 0.8   Rate control with Mexiletine Digoxin, and Lopressor   [x] AC therapy with Heparin, PTT goal 50-60   Serial EKG and cardiac enzymes     ***Pulmonary***  Post op vent management   Titration of FiO2 and PEEP, follow SpO2, CXR, blood gasses   Continue bronchodilators e    Mode: CPAP with PS  FiO2: 40  PEEP: 5  PS: 10  MAP: 9  PIP: 17              ***GI***  [x] Diet:  TFs, Glucerna 1.5 @ 55cc/hr   [x] Protonix       ***Renal***      Continue to monitor I/Os, BUN/Creatinine.   Replete lytes PRN  Seven present   Diuresis with Lasix     ***ID***  SCx on 10/4+Methicillin resistant Staphylococcus aureus, c/w IVPB Vancomycin   PO Vancomycin solution for C.diff prophylaxis       ***Endocrine***  [x]  DM : HbA1c 9.4%                - [x] ISS [x] NPH              - Need tight glycemic control to prevent wound infection.        Patient requires continuous monitoring with bedside rhythm monitoring, pulse oximetry monitoring, and continuous central venous and arterial pressure monitoring; and intermittent blood gas analysis. Care plan discussed with the ICU care team.   Patient remained critical, at risk for life threatening decompensation.    By signing my name below, I, Emelia Felipe, attest that this documentation has been prepared under the direction and in the presence of Alysa Tejada NP   Electronically signed: Rogers Burleson, 10-07-22 @ 22:08    IAlysa, personally performed the services described in this documentation. all medical record entries made by the rogers were at my direction and in my presence. I have reviewed the chart and agree that the record reflects my personal performance and is accurate and complete  Electronically signed: Alysa Tejada NP  Patient seen and examined at the bedside.    Remained critically ill on continuous ICU monitoring.    HPI:  71F PMH DM, COPD, Chronic Adrenal Insufficiency on Chronic prednisone, history of colorectal cancer s/p resection (colostomy bag), Hx of CAD, Chronic A fib on Eliquis, and tracheomalacia and multiple intubations, recent dx of OM presented to ED at [facility] this morning with epigastric pain, belching and central chest pain. Found on CTA to have type A aortic dissection and transferred to [facility] for surgical evaluation by Dr. Cabrera. (06 Sep 2022 16:32)      =================== LABS =========================                        8.2    18.32 )-----------( 152      ( 07 Oct 2022 00:36 )             27.5     10-07    142  |  109<H>  |  20  ----------------------------<  113<H>  4.3   |  27  |  0.47<L>    Ca    8.0<L>      07 Oct 2022 00:36  Phos  2.3     10-07  Mg     1.8     10-07    TPro  5.2<L>  /  Alb  2.5<L>  /  TBili  0.5  /  DBili  x   /  AST  31  /  ALT  112<H>  /  AlkPhos  168<H>  10-07    LIVER FUNCTIONS - ( 07 Oct 2022 00:36 )  Alb: 2.5 g/dL / Pro: 5.2 g/dL / ALK PHOS: 168 U/L / ALT: 112 U/L / AST: 31 U/L / GGT: x           PTT - ( 07 Oct 2022 16:27 )  PTT:56.3 sec  ABG - ( 07 Oct 2022 17:53 )  pH, Arterial: 7.45  pH, Blood: x     /  pCO2: 43    /  pO2: 89    / HCO3: 30    / Base Excess: 5.4   /  SaO2: 97.0                  ==================================================    OBJECTIVE:  Vital Signs Last 24 Hrs  T(C): 37.9 (07 Oct 2022 20:00), Max: 38.8 (07 Oct 2022 04:00)  T(F): 100.2 (07 Oct 2022 20:00), Max: 101.8 (07 Oct 2022 04:00)  HR: 69 (07 Oct 2022 22:00) (66 - 90)  BP: 40/18 (07 Oct 2022 20:00) (40/18 - 153/73)  BP(mean): 24 (07 Oct 2022 20:00) (24 - 100)  RR: 21 (07 Oct 2022 22:00) (4 - 29)  SpO2: 100% (07 Oct 2022 22:00) (96% - 100%)    Parameters below as of 07 Oct 2022 20:00  Patient On (Oxygen Delivery Method): ventilator    O2 Concentration (%): 40    REVIEW OF SYSTEMS:  [x ] N/A    Physical Exam:   General: intubated  Neurology: somnolent, but able to follow simple commands  ENT/Neck: Neck supple, trachea midline, No JVD  Respiratory: rhonchi throughout    CV: S1S2, no murmurs        [x] Sinus Rhythm   Abdominal: Soft, NT, ND, colostomy in place  Extremities: +4 RUE edema, 3+LUE edema, 2 + pedal edema noted, + peripheral pulses   Skin: Dry dressing over right heel, no Rashes, Hematoma, Ecchymosis         Assessment:  71F PMH DM, COPD, Chronic Adrenal Insufficiency on Chronic prednisone, history of colorectal cancer s/p resection (colostomy bag), Hx of CAD, Chronic A fib on Eliquis, and tracheomalacia and multiple intubations, recent dx of OM presented to ED at [facility] this morning with epigastric pain, belching and central chest pain. Found on CTA to have type A aortic dissection and transferred to [facility] for surgical evaluation by Dr. Cabrera.     Ascending aortic dissection s/p type A dissection repair and biobentall on 9/7   Hypovolemia   Post op respiratory failure Plan for Tracheostomy on Monday  Acute blood loss anemia  Stress hyperglycemia   RIJ thrombus  MRSA PNA    Plan:   ***Neuro***  [x] Sedated with [x] Precedex [x] Diprivan   Off sedation follows simple commands    ***Cardiovascular***  Limited TTE on 10/1: EF 69%, Hyperdynamic left ventricular systolic function. There is hypokinesis of the mid-base inferior wall. Nml RV fxn.   CT chest on 9/28: No CT evidence of pulmonary embolism.Status post repair of ascending aortic dissection with a stable dissection flap originating from the aortic arch and extending into the infrarenal abdominal aorta,  B/l UE duplex on 10/5: As before, there is an occluded RIGHT IJ vein with thrombosis extending to brachiocephalic vein. Superficial thrombophlebitis of the LEFT cephalic vein.    Continuous reassessment of hemodynamics / plan to change lines this PM   SR/ CVP 2 / MAP 85 / Hct 27.5%/ Lactate 0.8   Rate control with Mexiletine Digoxin, and Lopressor   [x] AC therapy with Heparin, PTT goal 50-60   Serial EKG and cardiac enzymes     ***Pulmonary***  Post op vent management   Titration of FiO2 and PEEP, follow SpO2, CXR, blood gasses   Continue bronchodilators duoneb, pulmacort    Mode: CPAP with PS 10  FiO2: 40  PEEP: 5  PS: 10  MAP: 9  PIP: 17              ***GI***  [x] Diet:  TFs, Glucerna 1.5 @ 55cc/hr   [x] Protonix       ***Renal***      Continue to monitor I/Os, BUN/Creatinine.   Replete lytes PRN  Seven present   Diuresis with Lasix     ***ID***  SCx on 10/4+Methicillin resistant Staphylococcus aureus, c/w IVPB Vancomycin  9/27 urine cult Candida Glabarata on flucanazole  PO Vancomycin solution for C.diff prophylaxis       ***Endocrine***  [x]  DM : HbA1c 9.4%                -Insulin drip             - Need tight glycemic control to prevent wound infection.        Patient requires continuous monitoring with bedside rhythm monitoring, pulse oximetry monitoring, and continuous central venous and arterial pressure monitoring; and intermittent blood gas analysis. Care plan discussed with the ICU care team.   Patient remained critical, at risk for life threatening decompensation.    By signing my name below, Emelia GAVIRIA, attest that this documentation has been prepared under the direction and in the presence of Alysa Tejada NP   Electronically signed: Georgi Burleson, 10-07-22 @ 22:08    Alysa GAVIRIA, personally performed the services described in this documentation. all medical record entries made by the scribe were at my direction and in my presence. I have reviewed the chart and agree that the record reflects my personal performance and is accurate and complete  Electronically signed: Alysa Tejada NP

## 2022-10-07 NOTE — PROGRESS NOTE ADULT - TIME BILLING
as above: no weaning--remains in resp failure-sepsis upvxyhgvrh-CXFZ-dbugzej vented/ABX-stable CV status  multifactorial dyspnea-resp failure-severe persistent asthma, TBM s/p tracheoplasty, s/p Aortic aneurysm repair, Bronchitis (proteus)-O2-keep 90%; wean as able off sedation  (hoping to avoid trach if able)  severe persistent asthma--medrol 8mg changed to hydrocortisone 100 q 8, singulair 10, duoneb q 6, budes .5 bid, tezspire 8/29-next 9/29  TBM-s/p tracheoplasty--accapella, unable to use vest due to surgery  s/p Aortic aneurysm repair--as per [facility]TS staff care  AF-on heparin--eventual eliquis rx               CV-improved cardene-MAP above 60  DVT R-IJ-on heparin rx  ID-s/p proteus bronchitis-s/p meropenum changed to ceftriaxone and back to meropenem/vanco- off of ABX as of 9/19--restart of ABX 9/27-meropenem/vanco-NOW solely vanco for MRSE sepsis and capsofungin as per ID  PT-OOB as able (on hold)                              GI-TF as able--f/up LFTs       VC dysfunction--aspiration precautions-sp and sw evaln--NGT in place/TF; FEESST passed 9/20  [facility]                                    Heme onc f/up colon ca  prog--critical in CTU                PT-when/if improved    Eulalio Allison MD-Pulmonary   [phone]

## 2022-10-07 NOTE — PROGRESS NOTE ADULT - ASSESSMENT
71 year-old female with a history of DM2, CAD, A-Fib on apixaban, Severe Persistent Asthma (on chronic steroids, recently started on Tezspire), colon cancer s/p resection/chemo, and tracheobronchomalacia s/p tracheoplasty, and recent OM of the R foot s/b debridement and completed course of Vancomycin/Ertapenem for MRSA/ESBL Proteus/Corynebacterium who now presents with chest pain, found to have Type A dissection s/p repair with modified Bentall procedure and hemiarch replacement on 9/6/22. Post-op course complicated by acute hypoxemic respiratory failure, shock, anemia, and hyperglycemia requiring insulin gtt. Extubated 9/13, now awake and alert with mild encephalopathy but overall significantly improved.    Assessment:  Acute hypoxemic respiratory failure requiring intubation  Type A Aortic Dissection  Severe Persistent Asthma  History of Tracheobronchomalacia  fevers and MSSA bacteremia and tracheal aspirate material with MSSA    Increasing leukocytosis but most recent blood cultures sent 10/5 are no growth to date  Fevers and MRSA bacteremia   positive 1/2 blood cultures sent 9/30 with Candida glabrata  Source for bacteremia and fungemia- most likely medi-port (which was removed)  prior cultures from 9/28 with high grade MRSA bacteremia  Continue IV vancomycin- trough 16. on 10/4 is therapeutic maintain current dosing   TTE performed 10/1 no evidence of vegetations on the valves EF improving but would favor repeating another TTE  Leukocytosis could also be in part due to extensive DVT RUE  Agree with abdominal sonogram in the setting of increasing GPT and alk phos        MRSA  bacteremia and candidemia  Will plan to treat for 4-6 weeks in the setting of post surgical repair of thoracic aorta dissection  Continue to follow CBC  Continue to follow creatinine  Continue to follow Vanco trough levels 10/6 =14.6   Will change the Caspofungin to fluconazole 600mg/day based on C. glabrata sensitivity pattern  plans for trach placement on 10/10    If condition changes would image with CT chest/abdomen/pelvis as feasible    Jeff Calixto MD  Can be called via Teams  After 5pm/weekends [phone]

## 2022-10-08 LAB
ALBUMIN SERPL ELPH-MCNC: 2.4 G/DL — LOW (ref 3.3–5)
ALP SERPL-CCNC: 142 U/L — HIGH (ref 40–120)
ALT FLD-CCNC: 72 U/L — HIGH (ref 10–45)
ANION GAP SERPL CALC-SCNC: 6 MMOL/L — SIGNIFICANT CHANGE UP (ref 5–17)
APTT BLD: 51.3 SEC — HIGH (ref 27.5–35.5)
AST SERPL-CCNC: 22 U/L — SIGNIFICANT CHANGE UP (ref 10–40)
BILIRUB SERPL-MCNC: 0.5 MG/DL — SIGNIFICANT CHANGE UP (ref 0.2–1.2)
BUN SERPL-MCNC: 16 MG/DL — SIGNIFICANT CHANGE UP (ref 7–23)
CALCIUM SERPL-MCNC: 8.1 MG/DL — LOW (ref 8.4–10.5)
CHLORIDE SERPL-SCNC: 104 MMOL/L — SIGNIFICANT CHANGE UP (ref 96–108)
CO2 SERPL-SCNC: 28 MMOL/L — SIGNIFICANT CHANGE UP (ref 22–31)
CREAT SERPL-MCNC: 0.4 MG/DL — LOW (ref 0.5–1.3)
EGFR: 104 ML/MIN/1.73M2 — SIGNIFICANT CHANGE UP
GAS PNL BLDA: SIGNIFICANT CHANGE UP
GLUCOSE BLDC GLUCOMTR-MCNC: 117 MG/DL — HIGH (ref 70–99)
GLUCOSE BLDC GLUCOMTR-MCNC: 119 MG/DL — HIGH (ref 70–99)
GLUCOSE BLDC GLUCOMTR-MCNC: 122 MG/DL — HIGH (ref 70–99)
GLUCOSE BLDC GLUCOMTR-MCNC: 142 MG/DL — HIGH (ref 70–99)
GLUCOSE BLDC GLUCOMTR-MCNC: 143 MG/DL — HIGH (ref 70–99)
GLUCOSE BLDC GLUCOMTR-MCNC: 147 MG/DL — HIGH (ref 70–99)
GLUCOSE BLDC GLUCOMTR-MCNC: 162 MG/DL — HIGH (ref 70–99)
GLUCOSE BLDC GLUCOMTR-MCNC: 163 MG/DL — HIGH (ref 70–99)
GLUCOSE BLDC GLUCOMTR-MCNC: 186 MG/DL — HIGH (ref 70–99)
GLUCOSE BLDC GLUCOMTR-MCNC: 191 MG/DL — HIGH (ref 70–99)
GLUCOSE BLDC GLUCOMTR-MCNC: 209 MG/DL — HIGH (ref 70–99)
GLUCOSE BLDC GLUCOMTR-MCNC: 218 MG/DL — HIGH (ref 70–99)
GLUCOSE BLDC GLUCOMTR-MCNC: 239 MG/DL — HIGH (ref 70–99)
GLUCOSE BLDC GLUCOMTR-MCNC: 90 MG/DL — SIGNIFICANT CHANGE UP (ref 70–99)
GLUCOSE SERPL-MCNC: 193 MG/DL — HIGH (ref 70–99)
HCT VFR BLD CALC: 24.9 % — LOW (ref 34.5–45)
HCT VFR BLD CALC: 24.9 % — LOW (ref 34.5–45)
HGB BLD-MCNC: 7.5 G/DL — LOW (ref 11.5–15.5)
HGB BLD-MCNC: 7.7 G/DL — LOW (ref 11.5–15.5)
MAGNESIUM SERPL-MCNC: 1.6 MG/DL — SIGNIFICANT CHANGE UP (ref 1.6–2.6)
MCHC RBC-ENTMCNC: 24.6 PG — LOW (ref 27–34)
MCHC RBC-ENTMCNC: 24.7 PG — LOW (ref 27–34)
MCHC RBC-ENTMCNC: 30.1 GM/DL — LOW (ref 32–36)
MCHC RBC-ENTMCNC: 30.9 GM/DL — LOW (ref 32–36)
MCV RBC AUTO: 79.8 FL — LOW (ref 80–100)
MCV RBC AUTO: 81.6 FL — SIGNIFICANT CHANGE UP (ref 80–100)
NRBC # BLD: 0 /100 WBCS — SIGNIFICANT CHANGE UP (ref 0–0)
NRBC # BLD: 1 /100 WBCS — HIGH (ref 0–0)
PHOSPHATE SERPL-MCNC: 2.8 MG/DL — SIGNIFICANT CHANGE UP (ref 2.5–4.5)
PLATELET # BLD AUTO: 159 K/UL — SIGNIFICANT CHANGE UP (ref 150–400)
PLATELET # BLD AUTO: 215 K/UL — SIGNIFICANT CHANGE UP (ref 150–400)
POTASSIUM SERPL-MCNC: 3.8 MMOL/L — SIGNIFICANT CHANGE UP (ref 3.5–5.3)
POTASSIUM SERPL-SCNC: 3.8 MMOL/L — SIGNIFICANT CHANGE UP (ref 3.5–5.3)
PROT SERPL-MCNC: 5.3 G/DL — LOW (ref 6–8.3)
RBC # BLD: 3.05 M/UL — LOW (ref 3.8–5.2)
RBC # BLD: 3.12 M/UL — LOW (ref 3.8–5.2)
RBC # FLD: 27.6 % — HIGH (ref 10.3–14.5)
RBC # FLD: 27.9 % — HIGH (ref 10.3–14.5)
SARS-COV-2 RNA SPEC QL NAA+PROBE: SIGNIFICANT CHANGE UP
SODIUM SERPL-SCNC: 138 MMOL/L — SIGNIFICANT CHANGE UP (ref 135–145)
VANCOMYCIN TROUGH SERPL-MCNC: 13.7 UG/ML — SIGNIFICANT CHANGE UP (ref 10–20)
WBC # BLD: 14.04 K/UL — HIGH (ref 3.8–10.5)
WBC # BLD: 15.92 K/UL — HIGH (ref 3.8–10.5)
WBC # FLD AUTO: 14.04 K/UL — HIGH (ref 3.8–10.5)
WBC # FLD AUTO: 15.92 K/UL — HIGH (ref 3.8–10.5)

## 2022-10-08 PROCEDURE — 36556 INSERT NON-TUNNEL CV CATH: CPT

## 2022-10-08 PROCEDURE — 71045 X-RAY EXAM CHEST 1 VIEW: CPT | Mod: 26

## 2022-10-08 PROCEDURE — 99233 SBSQ HOSP IP/OBS HIGH 50: CPT

## 2022-10-08 PROCEDURE — 99291 CRITICAL CARE FIRST HOUR: CPT | Mod: 25

## 2022-10-08 PROCEDURE — 36620 INSERTION CATHETER ARTERY: CPT

## 2022-10-08 RX ORDER — POTASSIUM CHLORIDE 20 MEQ
20 PACKET (EA) ORAL ONCE
Refills: 0 | Status: COMPLETED | OUTPATIENT
Start: 2022-10-08 | End: 2022-10-08

## 2022-10-08 RX ORDER — HUMAN INSULIN 100 [IU]/ML
18 INJECTION, SUSPENSION SUBCUTANEOUS EVERY 6 HOURS
Refills: 0 | Status: DISCONTINUED | OUTPATIENT
Start: 2022-10-08 | End: 2022-10-09

## 2022-10-08 RX ORDER — VASOPRESSIN 20 [USP'U]/ML
0.02 INJECTION INTRAVENOUS
Qty: 40 | Refills: 0 | Status: DISCONTINUED | OUTPATIENT
Start: 2022-10-08 | End: 2022-10-10

## 2022-10-08 RX ORDER — MAGNESIUM SULFATE 500 MG/ML
2 VIAL (ML) INJECTION ONCE
Refills: 0 | Status: COMPLETED | OUTPATIENT
Start: 2022-10-08 | End: 2022-10-08

## 2022-10-08 RX ORDER — ALBUMIN HUMAN 25 %
250 VIAL (ML) INTRAVENOUS ONCE
Refills: 0 | Status: COMPLETED | OUTPATIENT
Start: 2022-10-08 | End: 2022-10-08

## 2022-10-08 RX ORDER — HUMAN INSULIN 100 [IU]/ML
16 INJECTION, SUSPENSION SUBCUTANEOUS EVERY 6 HOURS
Refills: 0 | Status: DISCONTINUED | OUTPATIENT
Start: 2022-10-08 | End: 2022-10-08

## 2022-10-08 RX ADMIN — MAGNESIUM OXIDE 400 MG ORAL TABLET 400 MILLIGRAM(S): 241.3 TABLET ORAL at 15:33

## 2022-10-08 RX ADMIN — Medication 250 MILLIGRAM(S): at 19:16

## 2022-10-08 RX ADMIN — Medication 3 MILLILITER(S): at 23:13

## 2022-10-08 RX ADMIN — Medication 125 MILLIGRAM(S): at 06:02

## 2022-10-08 RX ADMIN — Medication 25 GRAM(S): at 03:31

## 2022-10-08 RX ADMIN — Medication 125 MILLIGRAM(S): at 20:37

## 2022-10-08 RX ADMIN — Medication 1 TABLET(S): at 13:27

## 2022-10-08 RX ADMIN — Medication 3 MILLILITER(S): at 05:03

## 2022-10-08 RX ADMIN — MEXILETINE HYDROCHLORIDE 200 MILLIGRAM(S): 150 CAPSULE ORAL at 21:00

## 2022-10-08 RX ADMIN — Medication 4: at 18:24

## 2022-10-08 RX ADMIN — DEXMEDETOMIDINE HYDROCHLORIDE IN 0.9% SODIUM CHLORIDE 8.38 MICROGRAM(S)/KG/HR: 4 INJECTION INTRAVENOUS at 21:00

## 2022-10-08 RX ADMIN — Medication 8 MILLIGRAM(S): at 08:53

## 2022-10-08 RX ADMIN — MEXILETINE HYDROCHLORIDE 200 MILLIGRAM(S): 150 CAPSULE ORAL at 06:02

## 2022-10-08 RX ADMIN — CHLORHEXIDINE GLUCONATE 15 MILLILITER(S): 213 SOLUTION TOPICAL at 18:22

## 2022-10-08 RX ADMIN — Medication 250 MILLIGRAM(S): at 06:02

## 2022-10-08 RX ADMIN — Medication 25 MILLIGRAM(S): at 18:16

## 2022-10-08 RX ADMIN — HYDROMORPHONE HYDROCHLORIDE 0.5 MILLIGRAM(S): 2 INJECTION INTRAMUSCULAR; INTRAVENOUS; SUBCUTANEOUS at 00:13

## 2022-10-08 RX ADMIN — Medication 125 MICROGRAM(S): at 13:27

## 2022-10-08 RX ADMIN — MAGNESIUM OXIDE 400 MG ORAL TABLET 400 MILLIGRAM(S): 241.3 TABLET ORAL at 21:01

## 2022-10-08 RX ADMIN — FLUCONAZOLE 150 MILLIGRAM(S): 150 TABLET ORAL at 07:50

## 2022-10-08 RX ADMIN — Medication 20 MILLIGRAM(S): at 07:23

## 2022-10-08 RX ADMIN — HEPARIN SODIUM 10.5 UNIT(S)/HR: 5000 INJECTION INTRAVENOUS; SUBCUTANEOUS at 21:02

## 2022-10-08 RX ADMIN — Medication 3 MILLILITER(S): at 18:31

## 2022-10-08 RX ADMIN — PANTOPRAZOLE SODIUM 40 MILLIGRAM(S): 20 TABLET, DELAYED RELEASE ORAL at 13:26

## 2022-10-08 RX ADMIN — Medication 3 MILLILITER(S): at 13:08

## 2022-10-08 RX ADMIN — HUMAN INSULIN 18 UNIT(S): 100 INJECTION, SUSPENSION SUBCUTANEOUS at 18:27

## 2022-10-08 RX ADMIN — HUMAN INSULIN 16 UNIT(S): 100 INJECTION, SUSPENSION SUBCUTANEOUS at 13:26

## 2022-10-08 RX ADMIN — Medication 0.5 MILLIGRAM(S): at 05:03

## 2022-10-08 RX ADMIN — Medication 1 APPLICATION(S): at 17:04

## 2022-10-08 RX ADMIN — Medication 20 MILLIGRAM(S): at 14:48

## 2022-10-08 RX ADMIN — VECURONIUM BROMIDE 2.5 MILLIGRAM(S): 20 INJECTION, POWDER, FOR SOLUTION INTRAVENOUS at 00:15

## 2022-10-08 RX ADMIN — Medication 0.5 MILLIGRAM(S): at 18:31

## 2022-10-08 RX ADMIN — MEXILETINE HYDROCHLORIDE 200 MILLIGRAM(S): 150 CAPSULE ORAL at 13:28

## 2022-10-08 RX ADMIN — MAGNESIUM OXIDE 400 MG ORAL TABLET 400 MILLIGRAM(S): 241.3 TABLET ORAL at 06:01

## 2022-10-08 RX ADMIN — Medication 125 MILLILITER(S): at 02:00

## 2022-10-08 RX ADMIN — Medication 20 MILLIEQUIVALENT(S): at 06:02

## 2022-10-08 RX ADMIN — CHLORHEXIDINE GLUCONATE 15 MILLILITER(S): 213 SOLUTION TOPICAL at 05:06

## 2022-10-08 RX ADMIN — Medication 4: at 23:15

## 2022-10-08 RX ADMIN — CHLORHEXIDINE GLUCONATE 1 APPLICATION(S): 213 SOLUTION TOPICAL at 05:07

## 2022-10-08 RX ADMIN — HUMAN INSULIN 18 UNIT(S): 100 INJECTION, SUSPENSION SUBCUTANEOUS at 23:13

## 2022-10-08 NOTE — PROGRESS NOTE ADULT - SUBJECTIVE AND OBJECTIVE BOX
CRITICAL CARE ATTENDING - CTICU    MEDICATIONS  (STANDING):  albuterol/ipratropium for Nebulization 3 milliLiter(s) Nebulizer every 6 hours  buDESOnide    Inhalation Suspension 0.5 milliGRAM(s) Inhalation every 12 hours  chlorhexidine 0.12% Liquid 15 milliLiter(s) Oral Mucosa every 12 hours  chlorhexidine 2% Cloths 1 Application(s) Topical <User Schedule>  collagenase Ointment 1 Application(s) Topical daily  dexMEDEtomidine Infusion 0.5 MICROgram(s)/kG/Hr (8.38 mL/Hr) IV Continuous <Continuous>  dextrose 50% Injectable 50 milliLiter(s) IV Push every 15 minutes  digoxin  Injectable 125 MICROGram(s) IV Push daily  fluconAZOLE IVPB 600 milliGRAM(s) IV Intermittent every 24 hours  furosemide    Tablet 20 milliGRAM(s) Oral two times a day  heparin  Infusion 600 Unit(s)/Hr (10.5 mL/Hr) IV Continuous <Continuous>  insulin lispro (ADMELOG) corrective regimen sliding scale   SubCutaneous every 6 hours  insulin NPH human recombinant 14 Unit(s) SubCutaneous every 6 hours  insulin regular Infusion 2 Unit(s)/Hr (2 mL/Hr) IV Continuous <Continuous>  magnesium oxide 400 milliGRAM(s) Oral every 8 hours  metoprolol tartrate 25 milliGRAM(s) Oral two times a day  mexiletine 200 milliGRAM(s) Oral every 8 hours  multivitamin 1 Tablet(s) Oral daily  pantoprazole  Injectable 40 milliGRAM(s) IV Push daily  potassium chloride   Solution 20 milliEquivalent(s) Oral once  predniSONE   Tablet 8 milliGRAM(s) Oral <User Schedule>  sodium chloride 0.9%. 1000 milliLiter(s) (10 mL/Hr) IV Continuous <Continuous>  vancomycin    Solution 125 milliGRAM(s) Oral every 12 hours  vancomycin  IVPB 1000 milliGRAM(s) IV Intermittent every 12 hours  vancomycin  IVPB      vasopressin Infusion 0.02 Unit(s)/Min (3 mL/Hr) IV Continuous <Continuous>                                    7.5    14.04 )-----------( 159      ( 08 Oct 2022 02:34 )             24.9       10-08    138  |  104  |  16  ----------------------------<  193<H>  3.8   |  28  |  0.40<L>    Ca    8.1<L>      08 Oct 2022 02:34  Phos  2.8     10-08  Mg     1.6     10-08    TPro  5.3<L>  /  Alb  2.4<L>  /  TBili  0.5  /  DBili  x   /  AST  22  /  ALT  72<H>  /  AlkPhos  142<H>  10-08      PTT - ( 07 Oct 2022 16:27 )  PTT:56.3 sec    Mode: CPAP with PS  FiO2: 40  PEEP: 5  PS: 10  MAP: 9  PIP: 17      Daily     Daily       10-06 @ 07:01  -  10-07 @ 07:00  --------------------------------------------------------  IN: 3156.5 mL / OUT: 3430 mL / NET: -273.5 mL    10-07 @ 07:01  -  10-08 @ 06:39  --------------------------------------------------------  IN: 1953 mL / OUT: 2925 mL / NET: -972 mL        Critically Ill patient  : [ ] preoperative ,   [x] post operative    Requires :  [x] Arterial Line   [x] Central Line  [ ] PA catheter  [ ] IABP  [ ] ECMO  [ ] LVAD  [x] Ventilator  [ ] pacemaker [ ] Impella.                      [x ] ABG's     [ x] Pulse Oxymetry Monitoring  Bedside evaluation , monitoring , treatment of hemodynamics , fluids , IVP/ IVCD meds.        Diagnosis:       9/7 Type A dissection repair, Modified bentall and hemiarch     Hypovolemia      Respiratory Failure       Requires chest PT, pulmonary toilet, ambu bagging, suctioning to maintain SaO2,  patent airway and treat atelectasis.     Difficult weaning process - multiple organ system involvement in critically ill patient     For Tracheostomy     CHF- acute [ ]   chronic [ x]    systolic [ ]   diastolic [x ]  Valvular [ ]          - Echo- EF -             [ ] RV dysfunction          - Cxr-cardiomegally, edema          - Clinical-  [ ]inotropes   [ ]pressors   [x ]diuresis   [ ]IABP   [ ]ECMO   [ ]LVAD   [ x]Respiratory Failure        -     Ventilator Management:  [x]AC-rest -   PC    [ x]CPAP-PS Wean ?   [ ]Trach Collar     [ ]Extubate    [ ] T-Piece  [x ]peep>5    Hemodynamic lability,  instability. Requires IVCD [ ] vasopressors [ ]  inotropes  [ x] vasodilator  [x ]IVSS fluid  to maintain MAP, perfusion, C.I.      DM- IVCD Insulin      Vent synchrony - IVCD Precedex      Hypocalcemia      COPD     Colostomy - colon CA     Renal Failure - Acute Kidney Injury     ? 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   Electronically Signed: Maria D'Amico, Scribe 10-08-22 @ 06:39      Discussed with CT surgeon, Physician Assistant - Nurse Practitioner- Critical care medicine team.   Discussed at  AM / PM rounds.   Chart, labs , films reviewed.    Cumulative Critical Care Time Given Today:  CRITICAL CARE ATTENDING - CTICU    MEDICATIONS  (STANDING):  albuterol/ipratropium for Nebulization 3 milliLiter(s) Nebulizer every 6 hours  buDESOnide    Inhalation Suspension 0.5 milliGRAM(s) Inhalation every 12 hours  chlorhexidine 0.12% Liquid 15 milliLiter(s) Oral Mucosa every 12 hours  chlorhexidine 2% Cloths 1 Application(s) Topical <User Schedule>  collagenase Ointment 1 Application(s) Topical daily  dexMEDEtomidine Infusion 0.5 MICROgram(s)/kG/Hr (8.38 mL/Hr) IV Continuous <Continuous>  dextrose 50% Injectable 50 milliLiter(s) IV Push every 15 minutes  digoxin  Injectable 125 MICROGram(s) IV Push daily  fluconAZOLE IVPB 600 milliGRAM(s) IV Intermittent every 24 hours  furosemide    Tablet 20 milliGRAM(s) Oral two times a day  heparin  Infusion 600 Unit(s)/Hr (10.5 mL/Hr) IV Continuous <Continuous>  insulin lispro (ADMELOG) corrective regimen sliding scale   SubCutaneous every 6 hours  insulin NPH human recombinant 14 Unit(s) SubCutaneous every 6 hours  insulin regular Infusion 2 Unit(s)/Hr (2 mL/Hr) IV Continuous <Continuous>  magnesium oxide 400 milliGRAM(s) Oral every 8 hours  metoprolol tartrate 25 milliGRAM(s) Oral two times a day  mexiletine 200 milliGRAM(s) Oral every 8 hours  multivitamin 1 Tablet(s) Oral daily  pantoprazole  Injectable 40 milliGRAM(s) IV Push daily  potassium chloride   Solution 20 milliEquivalent(s) Oral once  predniSONE   Tablet 8 milliGRAM(s) Oral <User Schedule>  sodium chloride 0.9%. 1000 milliLiter(s) (10 mL/Hr) IV Continuous <Continuous>  vancomycin    Solution 125 milliGRAM(s) Oral every 12 hours  vancomycin  IVPB 1000 milliGRAM(s) IV Intermittent every 12 hours  vancomycin  IVPB      vasopressin Infusion 0.02 Unit(s)/Min (3 mL/Hr) IV Continuous <Continuous>                                    7.5    14.04 )-----------( 159      ( 08 Oct 2022 02:34 )             24.9       10-08    138  |  104  |  16  ----------------------------<  193<H>  3.8   |  28  |  0.40<L>    Ca    8.1<L>      08 Oct 2022 02:34  Phos  2.8     10-08  Mg     1.6     10-08    TPro  5.3<L>  /  Alb  2.4<L>  /  TBili  0.5  /  DBili  x   /  AST  22  /  ALT  72<H>  /  AlkPhos  142<H>  10-08      PTT - ( 07 Oct 2022 16:27 )  PTT:56.3 sec    Mode: CPAP with PS  FiO2: 40  PEEP: 5  PS: 10  MAP: 9  PIP: 17      Daily     Daily       10-06 @ 07:01  -  10-07 @ 07:00  --------------------------------------------------------  IN: 3156.5 mL / OUT: 3430 mL / NET: -273.5 mL    10-07 @ 07:01  -  10-08 @ 06:39  --------------------------------------------------------  IN: 1953 mL / OUT: 2925 mL / NET: -972 mL        Critically Ill patient  : [ ] preoperative ,   [x] post operative    Requires :  [x] Arterial Line   [x] Central Line  [ ] PA catheter  [ ] IABP  [ ] ECMO  [ ] LVAD  [x] Ventilator  [ ] pacemaker [ ] Impella.                      [x ] ABG's     [ x] Pulse Oxymetry Monitoring  Bedside evaluation , monitoring , treatment of hemodynamics , fluids , IVP/ IVCD meds.        Diagnosis:       9/7 Type A dissection repair, Modified bentall and hemiarch     Hypotension a/w Hypertension, requiring intravenous medication.     Hypovolemia      Respiratory Failure       Requires chest PT, pulmonary toilet, ambu bagging, suctioning to maintain SaO2,  patent airway and treat atelectasis.     Difficult weaning process - multiple organ system involvement in critically ill patient     For Tracheostomy     CHF- acute [ ]   chronic [ x]    systolic [ ]   diastolic [x ]  Valvular [ ]          - Echo- EF -             [ ] RV dysfunction          - Cxr-cardiomegally, edema          - Clinical-  [ ]inotropes   [ ]pressors   [x ]diuresis   [ ]IABP   [ ]ECMO   [ ]LVAD   [ x]Respiratory Failure        -     Ventilator Management:  [x]AC-rest -   PC    [ x]CPAP-PS Wean ?   [ ]Trach Collar     [ ]Extubate    [ ] T-Piece  [x ]peep>5    Hemodynamic lability,  instability. Requires IVCD [ x] vasopressors on/off [ ]  inotropes  [ x] vasodilator  [x ]IVSS fluid  to maintain MAP, perfusion, C.I.      DM- IVCD Insulin      Vent synchrony - IVCD Precedex      Hypocalcemia      COPD     Colostomy - colon CA     Renal Failure - Acute Kidney Injury     ? 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   Electronically Signed: Maria D'Amico, Scribe 10-08-22 @ 06:39    I, Bon Jiménez, personally performed the services described in this documentation. All medical record entries made by the scribe were at my direction and in my presence. I have reviewed the chart and agree that the record reflects my personal performance and is accurate and complete.   Bon Jiménez MD.       Discussed with CT surgeon, Physician Assistant - Nurse Practitioner- Critical care medicine team.   Discussed at  AM / PM rounds.   Chart, labs , films reviewed.    Cumulative Critical Care Time Given Today:  30 min

## 2022-10-08 NOTE — PROGRESS NOTE ADULT - SUBJECTIVE AND OBJECTIVE BOX
[facility] DIVISION OF PULMONARY, CRITICAL CARE and SLEEP MEDICINE  PULMONARY PROGRESS NOTE  OFFICE NUMBER: [phone]    PATIENT INFORMATION:  NAME: RAYRAY RODRIGUEZ:  MRN: [id number]    CHIEF COMPLAINT: Patient is a 72y old  Female who presents with a chief complaint of Type A aortic dissection (08 Oct 2022 06:39)      [x] INITIAL CONSULT, H&P, FAMILY HISTORY and PAST MEDICAL AND SURGICAL HISTORY REVIEWED    OVERNIGHT EVENTS or CHANGES TO HPI:     ========================REVIEW OF SYSTEMS========================  CONSTITUTIONAL:  CARDIOVASCULAR:  PULMONARY:  [] REMAINING REVIEW OF SYSTEMS NEGATIVE  [] UNABLE TO OBTAIN REVIEW OF SYSTEMS DUE TO _______________    ========================MEDICATIONS=============================  MEDICATIONS  (STANDING):  albuterol/ipratropium for Nebulization 3 milliLiter(s) Nebulizer every 6 hours  buDESOnide    Inhalation Suspension 0.5 milliGRAM(s) Inhalation every 12 hours  chlorhexidine 0.12% Liquid 15 milliLiter(s) Oral Mucosa every 12 hours  chlorhexidine 2% Cloths 1 Application(s) Topical <User Schedule>  collagenase Ointment 1 Application(s) Topical daily  dexMEDEtomidine Infusion 0.5 MICROgram(s)/kG/Hr (8.38 mL/Hr) IV Continuous <Continuous>  dextrose 50% Injectable 50 milliLiter(s) IV Push every 15 minutes  digoxin  Injectable 125 MICROGram(s) IV Push daily  fluconAZOLE IVPB 600 milliGRAM(s) IV Intermittent every 24 hours  furosemide    Tablet 20 milliGRAM(s) Oral two times a day  heparin  Infusion 600 Unit(s)/Hr (10.5 mL/Hr) IV Continuous <Continuous>  insulin lispro (ADMELOG) corrective regimen sliding scale   SubCutaneous every 6 hours  insulin NPH human recombinant 16 Unit(s) SubCutaneous every 6 hours  magnesium oxide 400 milliGRAM(s) Oral every 8 hours  metoprolol tartrate 25 milliGRAM(s) Oral two times a day  mexiletine 200 milliGRAM(s) Oral every 8 hours  multivitamin 1 Tablet(s) Oral daily  pantoprazole  Injectable 40 milliGRAM(s) IV Push daily  predniSONE   Tablet 8 milliGRAM(s) Oral <User Schedule>  sodium chloride 0.9%. 1000 milliLiter(s) (10 mL/Hr) IV Continuous <Continuous>  vancomycin    Solution 125 milliGRAM(s) Oral every 12 hours  vancomycin  IVPB 1000 milliGRAM(s) IV Intermittent every 12 hours  vancomycin  IVPB      vasopressin Infusion 0.02 Unit(s)/Min (3 mL/Hr) IV Continuous <Continuous>      MEDICATIONS  (PRN):  simethicone drops 80 milliGRAM(s) Oral every 12 hours PRN Gas      ========================PHYSICAL EXAM============================    VITALS: ICU Vital Signs Last 24 Hrs  T(C): 37.7 (08 Oct 2022 12:00), Max: 38.1 (07 Oct 2022 16:00)  T(F): 99.9 (08 Oct 2022 12:00), Max: 100.6 (07 Oct 2022 16:00)  HR: 90 (08 Oct 2022 14:00) (56 - 98)  BP: 40/18 (07 Oct 2022 20:00) (40/18 - 153/73)  BP(mean): 24 (07 Oct 2022 20:00) (24 - 100)  ABP: 115/48 (08 Oct 2022 14:00) (105/34 - 190/59)  ABP(mean): 70 (08 Oct 2022 14:00) (50 - 107)  RR: 12 (08 Oct 2022 14:00) (4 - 171)  SpO2: 98% (08 Oct 2022 14:00) (92% - 100%)    O2 Parameters below as of 08 Oct 2022 14:00  Patient On (Oxygen Delivery Method): ventilator    O2 Concentration (%): 40        INTAKE and OUTPUT: I&O's Summary    07 Oct 2022 07:01  -  08 Oct 2022 07:00  --------------------------------------------------------  IN: 3023.1 mL / OUT: 3395 mL / NET: -371.9 mL    08 Oct 2022 07:01  -  08 Oct 2022 14:39  --------------------------------------------------------  IN: 665.3 mL / OUT: 1125 mL / NET: -459.7 mL        VENTILATOR SETTINGS: Mode: CPAP with PS  FiO2: 40  PEEP: 5  PS: 10  MAP: 9  PIP: 16      GENERAL:   EYES:  EAR/NOSE/MOUTH/THROAT:  NECK:  LYMPH NODES:  CARDIOVASCULAR:  RESPIRATORY:  ABDOMEN:  EXTREMITIES  SKIN:  MUSCULOSKELETAL:   NEUROLOGIC:  PSYCHIATRIC    ========================LABORATORY RESULTS AND IMAGING=============                        7.5    14.04 )-----------( 159      ( 08 Oct 2022 02:34 )             24.9                                                    10-08    138  |  104  |  16  ----------------------------<  193<H>  3.8   |  28  |  0.40<L>    Ca    8.1<L>      08 Oct 2022 02:34  Phos  2.8     10-08  Mg     1.6     10-08    TPro  5.3<L>  /  Alb  2.4<L>  /  TBili  0.5  /  DBili  x   /  AST  22  /  ALT  72<H>  /  AlkPhos  142<H>  10-08      ABG - ( 08 Oct 2022 02:30 )  pH, Arterial: 7.40  pH, Blood: x     /  pCO2: 48    /  pO2: 81    / HCO3: 30    / Base Excess: 4.4   /  SaO2: 95.5                Creatinine Trend: 0.40<--, 0.47<--, 0.50<--, 0.43<--, 0.48<--, 0.42<--    CT CHEST:     [] RADIOLOGY REVIEWED AND INTERPRETED BY ME      THANK YOU FOR ALLOWING US TO PARTICIPATE IN THE CARE OF THIS PATIENT     [facility] DIVISION OF PULMONARY, CRITICAL CARE and SLEEP MEDICINE  PULMONARY PROGRESS NOTE  OFFICE NUMBER: [phone]    PATIENT INFORMATION:  NAME: RAYRAY RODRIGUEZ:  MRN: [id number]    CHIEF COMPLAINT: Patient is a 72y old  Female who presents with a chief complaint of Type A aortic dissection (08 Oct 2022 06:39)      [x] INITIAL CONSULT, H&P, FAMILY HISTORY and PAST MEDICAL AND SURGICAL HISTORY REVIEWED    OVERNIGHT EVENTS or CHANGES TO HPI:   no events  ========================REVIEW OF SYSTEMS========================  CONSTITUTIONAL:  CARDIOVASCULAR:  PULMONARY:  [] REMAINING REVIEW OF SYSTEMS NEGATIVE  [x] UNABLE TO OBTAIN REVIEW OF SYSTEMS DUE TO ____patient intubated,___________    ========================MEDICATIONS=============================  MEDICATIONS  (STANDING):  albuterol/ipratropium for Nebulization 3 milliLiter(s) Nebulizer every 6 hours  buDESOnide    Inhalation Suspension 0.5 milliGRAM(s) Inhalation every 12 hours  chlorhexidine 0.12% Liquid 15 milliLiter(s) Oral Mucosa every 12 hours  chlorhexidine 2% Cloths 1 Application(s) Topical <User Schedule>  collagenase Ointment 1 Application(s) Topical daily  dexMEDEtomidine Infusion 0.5 MICROgram(s)/kG/Hr (8.38 mL/Hr) IV Continuous <Continuous>  dextrose 50% Injectable 50 milliLiter(s) IV Push every 15 minutes  digoxin  Injectable 125 MICROGram(s) IV Push daily  fluconAZOLE IVPB 600 milliGRAM(s) IV Intermittent every 24 hours  furosemide    Tablet 20 milliGRAM(s) Oral two times a day  heparin  Infusion 600 Unit(s)/Hr (10.5 mL/Hr) IV Continuous <Continuous>  insulin lispro (ADMELOG) corrective regimen sliding scale   SubCutaneous every 6 hours  insulin NPH human recombinant 16 Unit(s) SubCutaneous every 6 hours  magnesium oxide 400 milliGRAM(s) Oral every 8 hours  metoprolol tartrate 25 milliGRAM(s) Oral two times a day  mexiletine 200 milliGRAM(s) Oral every 8 hours  multivitamin 1 Tablet(s) Oral daily  pantoprazole  Injectable 40 milliGRAM(s) IV Push daily  predniSONE   Tablet 8 milliGRAM(s) Oral <User Schedule>  sodium chloride 0.9%. 1000 milliLiter(s) (10 mL/Hr) IV Continuous <Continuous>  vancomycin    Solution 125 milliGRAM(s) Oral every 12 hours  vancomycin  IVPB 1000 milliGRAM(s) IV Intermittent every 12 hours  vancomycin  IVPB      vasopressin Infusion 0.02 Unit(s)/Min (3 mL/Hr) IV Continuous <Continuous>      MEDICATIONS  (PRN):  simethicone drops 80 milliGRAM(s) Oral every 12 hours PRN Gas      ========================PHYSICAL EXAM============================    VITALS: ICU Vital Signs Last 24 Hrs  T(C): 37.7 (08 Oct 2022 12:00), Max: 38.1 (07 Oct 2022 16:00)  T(F): 99.9 (08 Oct 2022 12:00), Max: 100.6 (07 Oct 2022 16:00)  HR: 90 (08 Oct 2022 14:00) (56 - 98)  BP: 40/18 (07 Oct 2022 20:00) (40/18 - 153/73)  BP(mean): 24 (07 Oct 2022 20:00) (24 - 100)  ABP: 115/48 (08 Oct 2022 14:00) (105/34 - 190/59)  ABP(mean): 70 (08 Oct 2022 14:00) (50 - 107)  RR: 12 (08 Oct 2022 14:00) (4 - 171)  SpO2: 98% (08 Oct 2022 14:00) (92% - 100%)    O2 Parameters below as of 08 Oct 2022 14:00  Patient On (Oxygen Delivery Method): ventilator    O2 Concentration (%): 40        INTAKE and OUTPUT: I&O's Summary    07 Oct 2022 07:01  -  08 Oct 2022 07:00  --------------------------------------------------------  IN: 3023.1 mL / OUT: 3395 mL / NET: -371.9 mL    08 Oct 2022 07:01  -  08 Oct 2022 14:39  --------------------------------------------------------  IN: 665.3 mL / OUT: 1125 mL / NET: -459.7 mL        VENTILATOR SETTINGS: Mode: CPAP with PS  FiO2: 40  PEEP: 5  PS: 10  MAP: 9  PIP: 16      GENERAL: awake alert in bed, responds by nodding  CARDIOVASCULAR: RRR  RESPIRATORY: good air movement, clear BS  ABDOMEN:  EXTREMITIES without edema  SKIN:  MUSCULOSKELETAL:   NEUROLOGIC: awake, alert  PSYCHIATRIC    ========================LABORATORY RESULTS AND IMAGING=============                        7.5    14.04 )-----------( 159      ( 08 Oct 2022 02:34 )             24.9                                                    10-08    138  |  104  |  16  ----------------------------<  193<H>  3.8   |  28  |  0.40<L>    Ca    8.1<L>      08 Oct 2022 02:34  Phos  2.8     10-08  Mg     1.6     10-08    TPro  5.3<L>  /  Alb  2.4<L>  /  TBili  0.5  /  DBili  x   /  AST  22  /  ALT  72<H>  /  AlkPhos  142<H>  10-08      ABG - ( 08 Oct 2022 02:30 )  pH, Arterial: 7.40  pH, Blood: x     /  pCO2: 48    /  pO2: 81    / HCO3: 30    / Base Excess: 4.4   /  SaO2: 95.5                Creatinine Trend: 0.40<--, 0.47<--, 0.50<--, 0.43<--, 0.48<--, 0.42<--    CT CHEST:     [x] RADIOLOGY REVIEWED AND INTERPRETED BY ME- CXR bilateral small effusions      THANK YOU FOR ALLOWING US TO PARTICIPATE IN THE CARE OF THIS PATIENT

## 2022-10-08 NOTE — PROGRESS NOTE ADULT - ASSESSMENT
71 year-old female with a history of DM2, CAD, A-Fib on apixaban, Severe Persistent Asthma (on chronic steroids, recently started on Tezspire), colon cancer s/p resection/chemo, and tracheobronchomalacia s/p tracheoplasty, and recent OM of the R foot s/b debridement and completed course of Vancomycin/Ertapenem for MRSA/ESBL Proteus/Corynebacterium who now presents with chest pain, found to have Type A dissection s/p repair with modified Bentall procedure and hemiarch replacement on 22. Post-op course complicated by acute hypoxemic respiratory failure, shock, anemia, and hyperglycemia requiring insulin gtt. Extubated , now awake and alert with mild encephalopathy but overall significantly improved.    Assessment:  Acute hypoxemic respiratory failure requiring intubation  Type A Aortic Dissection  Severe Persistent Asthma  History of Tracheobronchomalacia    Plan:  See below:  -**************************                                SEE Below:  -improving but weakness  9/15-ABX adjusted to ceftriaxone (LFTS)-PICU  -PICU, low grade temp-fever work up in progress, no resp decline or sx  -resp stable at present but tenuous  -for FEESST today, NGT in place w/TF  -s/p FEESST-passed, remains in PICU          -TF in place at 40cc, more OOB          -hypoglycemia noted and rx, no change in resp status  -vented/sedated-pressors/inotropes/ABX  -remains vented on nitrous/less O2 needs, improving LFTS                   -vented/semi sedated--less O2 needs  10/3-on vanco, weaning sedation/, all lines changed  10/6-no changes-now afebrile-on vanco   10/7-no weaning-remains off pressors 71 year-old female with a history of DM2, CAD, A-Fib on apixaban, Severe Persistent Asthma (on chronic steroids, recently started on Tezspire), colon cancer s/p resection/chemo, and tracheobronchomalacia s/p tracheoplasty, and recent OM of the R foot s/b debridement and completed course of Vancomycin/Ertapenem for MRSA/ESBL Proteus/Corynebacterium who now presents with chest pain, found to have Type A dissection s/p repair with modified Bentall procedure and hemiarch replacement on 9/6/22. Post-op course complicated by acute hypoxemic respiratory failure, shock, anemia, and hyperglycemia requiring insulin gtt. Extubated 9/13, now awake and alert with mild encephalopathy but overall significantly improved.    Assessment:  Acute hypoxemic respiratory failure requiring intubation  Type A Aortic Dissection  Severe Persistent Asthma  History of Tracheobronchomalacia    Plan:   Continue daily PS trials,  OOB to chair if able to- if CTSx team feels patient is stable to do so.  Further management as per CTS team

## 2022-10-08 NOTE — PROCEDURE NOTE - NSPOSTPRCRAD_GEN_A_CORE
central line located in the superior vena cava/post procedure radiography not performed
post-procedure radiography performed

## 2022-10-09 ENCOUNTER — TRANSCRIPTION ENCOUNTER (OUTPATIENT)
Age: 72
End: 2022-10-09

## 2022-10-09 LAB
ALBUMIN SERPL ELPH-MCNC: 2.8 G/DL — LOW (ref 3.3–5)
ALP SERPL-CCNC: 152 U/L — HIGH (ref 40–120)
ALT FLD-CCNC: 64 U/L — HIGH (ref 10–45)
ANION GAP SERPL CALC-SCNC: 6 MMOL/L — SIGNIFICANT CHANGE UP (ref 5–17)
APTT BLD: 50 SEC — HIGH (ref 27.5–35.5)
AST SERPL-CCNC: 19 U/L — SIGNIFICANT CHANGE UP (ref 10–40)
BILIRUB SERPL-MCNC: 0.5 MG/DL — SIGNIFICANT CHANGE UP (ref 0.2–1.2)
BUN SERPL-MCNC: 14 MG/DL — SIGNIFICANT CHANGE UP (ref 7–23)
CALCIUM SERPL-MCNC: 8.4 MG/DL — SIGNIFICANT CHANGE UP (ref 8.4–10.5)
CHLORIDE SERPL-SCNC: 101 MMOL/L — SIGNIFICANT CHANGE UP (ref 96–108)
CO2 SERPL-SCNC: 30 MMOL/L — SIGNIFICANT CHANGE UP (ref 22–31)
CREAT SERPL-MCNC: 0.42 MG/DL — LOW (ref 0.5–1.3)
EGFR: 103 ML/MIN/1.73M2 — SIGNIFICANT CHANGE UP
GAS PNL BLDA: SIGNIFICANT CHANGE UP
GAS PNL BLDA: SIGNIFICANT CHANGE UP
GLUCOSE BLDC GLUCOMTR-MCNC: 113 MG/DL — HIGH (ref 70–99)
GLUCOSE BLDC GLUCOMTR-MCNC: 129 MG/DL — HIGH (ref 70–99)
GLUCOSE BLDC GLUCOMTR-MCNC: 141 MG/DL — HIGH (ref 70–99)
GLUCOSE BLDC GLUCOMTR-MCNC: 219 MG/DL — HIGH (ref 70–99)
GLUCOSE SERPL-MCNC: 228 MG/DL — HIGH (ref 70–99)
HCT VFR BLD CALC: 24.9 % — LOW (ref 34.5–45)
HGB BLD-MCNC: 7.5 G/DL — LOW (ref 11.5–15.5)
INR BLD: 1.13 RATIO — SIGNIFICANT CHANGE UP (ref 0.88–1.16)
MAGNESIUM SERPL-MCNC: 1.8 MG/DL — SIGNIFICANT CHANGE UP (ref 1.6–2.6)
MCHC RBC-ENTMCNC: 24.6 PG — LOW (ref 27–34)
MCHC RBC-ENTMCNC: 30.1 GM/DL — LOW (ref 32–36)
MCV RBC AUTO: 81.6 FL — SIGNIFICANT CHANGE UP (ref 80–100)
NRBC # BLD: 0 /100 WBCS — SIGNIFICANT CHANGE UP (ref 0–0)
PHOSPHATE SERPL-MCNC: 2.2 MG/DL — LOW (ref 2.5–4.5)
PLATELET # BLD AUTO: 219 K/UL — SIGNIFICANT CHANGE UP (ref 150–400)
POTASSIUM SERPL-MCNC: 4.2 MMOL/L — SIGNIFICANT CHANGE UP (ref 3.5–5.3)
POTASSIUM SERPL-SCNC: 4.2 MMOL/L — SIGNIFICANT CHANGE UP (ref 3.5–5.3)
PROT SERPL-MCNC: 5.9 G/DL — LOW (ref 6–8.3)
PROTHROM AB SERPL-ACNC: 13 SEC — SIGNIFICANT CHANGE UP (ref 10.5–13.4)
RBC # BLD: 3.05 M/UL — LOW (ref 3.8–5.2)
RBC # FLD: 27.3 % — HIGH (ref 10.3–14.5)
SODIUM SERPL-SCNC: 137 MMOL/L — SIGNIFICANT CHANGE UP (ref 135–145)
VANCOMYCIN TROUGH SERPL-MCNC: 14.5 UG/ML — SIGNIFICANT CHANGE UP (ref 10–20)
WBC # BLD: 12.98 K/UL — HIGH (ref 3.8–10.5)
WBC # FLD AUTO: 12.98 K/UL — HIGH (ref 3.8–10.5)

## 2022-10-09 PROCEDURE — 99291 CRITICAL CARE FIRST HOUR: CPT

## 2022-10-09 PROCEDURE — 99231 SBSQ HOSP IP/OBS SF/LOW 25: CPT

## 2022-10-09 PROCEDURE — 71045 X-RAY EXAM CHEST 1 VIEW: CPT | Mod: 26

## 2022-10-09 RX ORDER — HUMAN INSULIN 100 [IU]/ML
19 INJECTION, SUSPENSION SUBCUTANEOUS EVERY 6 HOURS
Refills: 0 | Status: DISCONTINUED | OUTPATIENT
Start: 2022-10-09 | End: 2022-10-10

## 2022-10-09 RX ADMIN — MAGNESIUM OXIDE 400 MG ORAL TABLET 400 MILLIGRAM(S): 241.3 TABLET ORAL at 23:08

## 2022-10-09 RX ADMIN — Medication 3 MILLILITER(S): at 17:19

## 2022-10-09 RX ADMIN — Medication 3 MILLILITER(S): at 11:03

## 2022-10-09 RX ADMIN — Medication 250 MILLIGRAM(S): at 05:32

## 2022-10-09 RX ADMIN — Medication 0.5 MILLIGRAM(S): at 05:36

## 2022-10-09 RX ADMIN — Medication 125 MILLIGRAM(S): at 05:32

## 2022-10-09 RX ADMIN — Medication 0.5 MILLIGRAM(S): at 17:19

## 2022-10-09 RX ADMIN — HUMAN INSULIN 19 UNIT(S): 100 INJECTION, SUSPENSION SUBCUTANEOUS at 12:14

## 2022-10-09 RX ADMIN — Medication 3 MILLILITER(S): at 05:37

## 2022-10-09 RX ADMIN — HEPARIN SODIUM 10.5 UNIT(S)/HR: 5000 INJECTION INTRAVENOUS; SUBCUTANEOUS at 23:58

## 2022-10-09 RX ADMIN — Medication 125 MICROGRAM(S): at 12:14

## 2022-10-09 RX ADMIN — Medication 1 TABLET(S): at 12:13

## 2022-10-09 RX ADMIN — Medication 20 MILLIGRAM(S): at 12:13

## 2022-10-09 RX ADMIN — MEXILETINE HYDROCHLORIDE 200 MILLIGRAM(S): 150 CAPSULE ORAL at 05:31

## 2022-10-09 RX ADMIN — CHLORHEXIDINE GLUCONATE 15 MILLILITER(S): 213 SOLUTION TOPICAL at 05:30

## 2022-10-09 RX ADMIN — MAGNESIUM OXIDE 400 MG ORAL TABLET 400 MILLIGRAM(S): 241.3 TABLET ORAL at 05:31

## 2022-10-09 RX ADMIN — Medication 125 MILLIGRAM(S): at 17:54

## 2022-10-09 RX ADMIN — PANTOPRAZOLE SODIUM 40 MILLIGRAM(S): 20 TABLET, DELAYED RELEASE ORAL at 12:13

## 2022-10-09 RX ADMIN — CHLORHEXIDINE GLUCONATE 15 MILLILITER(S): 213 SOLUTION TOPICAL at 17:44

## 2022-10-09 RX ADMIN — MEXILETINE HYDROCHLORIDE 200 MILLIGRAM(S): 150 CAPSULE ORAL at 23:58

## 2022-10-09 RX ADMIN — Medication 8 MILLIGRAM(S): at 08:08

## 2022-10-09 RX ADMIN — CHLORHEXIDINE GLUCONATE 1 APPLICATION(S): 213 SOLUTION TOPICAL at 05:57

## 2022-10-09 RX ADMIN — DEXMEDETOMIDINE HYDROCHLORIDE IN 0.9% SODIUM CHLORIDE 8.38 MICROGRAM(S)/KG/HR: 4 INJECTION INTRAVENOUS at 23:58

## 2022-10-09 RX ADMIN — FLUCONAZOLE 150 MILLIGRAM(S): 150 TABLET ORAL at 07:22

## 2022-10-09 RX ADMIN — Medication 1 APPLICATION(S): at 06:00

## 2022-10-09 RX ADMIN — Medication 25 MILLIGRAM(S): at 17:43

## 2022-10-09 RX ADMIN — HUMAN INSULIN 19 UNIT(S): 100 INJECTION, SUSPENSION SUBCUTANEOUS at 17:43

## 2022-10-09 RX ADMIN — Medication 25 MILLIGRAM(S): at 05:31

## 2022-10-09 RX ADMIN — Medication 20 MILLIGRAM(S): at 05:31

## 2022-10-09 RX ADMIN — MEXILETINE HYDROCHLORIDE 200 MILLIGRAM(S): 150 CAPSULE ORAL at 14:06

## 2022-10-09 RX ADMIN — HUMAN INSULIN 18 UNIT(S): 100 INJECTION, SUSPENSION SUBCUTANEOUS at 05:31

## 2022-10-09 RX ADMIN — Medication 4: at 17:44

## 2022-10-09 RX ADMIN — MAGNESIUM OXIDE 400 MG ORAL TABLET 400 MILLIGRAM(S): 241.3 TABLET ORAL at 12:13

## 2022-10-09 NOTE — PROGRESS NOTE ADULT - SUBJECTIVE AND OBJECTIVE BOX
Attending: Dr Medeiros  Procedure: Tracheostomy  PreOp Dx: Respiratory failure                            7.5    12.98 )-----------( 219      ( 09 Oct 2022 00:25 )             24.9   10-09    137  |  101  |  14  ----------------------------<  228<H>  4.2   |  30  |  0.42<L>    Ca    8.4      09 Oct 2022 00:25  Phos  2.2     10-09  Mg     1.8     10-09    TPro  5.9<L>  /  Alb  2.8<L>  /  TBili  0.5  /  DBili  x   /  AST  19  /  ALT  64<H>  /  AlkPhos  152<H>  10-09  PT/INR - ( 09 Oct 2022 00:25 )   PT: 13.0 sec;   INR: 1.13 ratio         PTT - ( 09 Oct 2022 00:25 )  PTT:50.0 sec    EKG: Normal sinus rhythym  CXR: Stable small pleural effusion  Type/screen: B positive  Consent obtained

## 2022-10-09 NOTE — PROGRESS NOTE ADULT - SUBJECTIVE AND OBJECTIVE BOX
CRITICAL CARE ATTENDING - CTICU    MEDICATIONS  (STANDING):  albuterol/ipratropium for Nebulization 3 milliLiter(s) Nebulizer every 6 hours  buDESOnide    Inhalation Suspension 0.5 milliGRAM(s) Inhalation every 12 hours  chlorhexidine 0.12% Liquid 15 milliLiter(s) Oral Mucosa every 12 hours  chlorhexidine 2% Cloths 1 Application(s) Topical <User Schedule>  collagenase Ointment 1 Application(s) Topical daily  dexMEDEtomidine Infusion 0.5 MICROgram(s)/kG/Hr (8.38 mL/Hr) IV Continuous <Continuous>  dextrose 50% Injectable 50 milliLiter(s) IV Push every 15 minutes  digoxin  Injectable 125 MICROGram(s) IV Push daily  fluconAZOLE IVPB 600 milliGRAM(s) IV Intermittent every 24 hours  furosemide    Tablet 20 milliGRAM(s) Oral two times a day  heparin  Infusion 600 Unit(s)/Hr (10.5 mL/Hr) IV Continuous <Continuous>  insulin lispro (ADMELOG) corrective regimen sliding scale   SubCutaneous every 6 hours  insulin NPH human recombinant 18 Unit(s) SubCutaneous every 6 hours  magnesium oxide 400 milliGRAM(s) Oral every 8 hours  metoprolol tartrate 25 milliGRAM(s) Oral two times a day  mexiletine 200 milliGRAM(s) Oral every 8 hours  multivitamin 1 Tablet(s) Oral daily  pantoprazole  Injectable 40 milliGRAM(s) IV Push daily  predniSONE   Tablet 8 milliGRAM(s) Oral <User Schedule>  sodium chloride 0.9%. 1000 milliLiter(s) (10 mL/Hr) IV Continuous <Continuous>  vancomycin    Solution 125 milliGRAM(s) Oral every 12 hours  vasopressin Infusion 0.02 Unit(s)/Min (3 mL/Hr) IV Continuous <Continuous>                                    7.5    12.98 )-----------( 219      ( 09 Oct 2022 00:25 )             24.9       10-    137  |  101  |  14  ----------------------------<  228<H>  4.2   |  30  |  0.42<L>    Ca    8.4      09 Oct 2022 00:25  Phos  2.2     10-  Mg     1.8     10    TPro  5.9<L>  /  Alb  2.8<L>  /  TBili  0.5  /  DBili  x   /  AST  19  /  ALT  64<H>  /  AlkPhos  152<H>  10-09      PT/INR - ( 09 Oct 2022 00:25 )   PT: 13.0 sec;   INR: 1.13 ratio         PTT - ( 09 Oct 2022 00:25 )  PTT:50.0 sec    Mode: CPAP with PS  FiO2: 40  PEEP: 5  PS: 15  MAP: 9  PIP: 20      Daily     Daily Weight in k.8 (09 Oct 2022 00:00)      10-07 @ 07:  -  10-08 @ 07:00  --------------------------------------------------------  IN: 3023.1 mL / OUT: 3395 mL / NET: -371.9 mL    10-08 @ 07:01  -  10-09 @ 06:44  --------------------------------------------------------  IN: 2694.8 mL / OUT: 4310 mL / NET: -1615.2 mL        Critically Ill patient  : [ ] preoperative ,   [x] post operative    Requires :  [x] Arterial Line   [x] Central Line  [ ] PA catheter  [ ] IABP  [ ] ECMO  [ ] LVAD  [x] Ventilator  [ ] pacemaker [ ] Impella.                      [x ] ABG's     [ x] Pulse Oxymetry Monitoring  Bedside evaluation , monitoring , treatment of hemodynamics , fluids , IVP/ IVCD meds.        Diagnosis:      Type A dissection repair, Modified bentall and hemiarch     Hypotension a/w Hypertension, requiring intravenous medication.     Hypovolemia      Respiratory Failure       Requires chest PT, pulmonary toilet, ambu bagging, suctioning to maintain SaO2,  patent airway and treat atelectasis.     Difficult weaning process - multiple organ system involvement in critically ill patient     For Tracheostomy     CHF- acute [ ]   chronic [ x]    systolic [ ]   diastolic [x ]  Valvular [ ]          - Echo- EF -             [ ] RV dysfunction          - Cxr-cardiomegally, edema          - Clinical-  [ ]inotropes   [ ]pressors   [x ]diuresis   [ ]IABP   [ ]ECMO   [ ]LVAD   [ x]Respiratory Failure        -     Ventilator Management:  [x]AC-rest -   PC    [ x]CPAP-PS Wean ?   [ ]Trach Collar     [ ]Extubate    [ ] T-Piece  [x ]peep>5    Hemodynamic lability,  instability. Requires IVCD [ x] vasopressors on/off [ ]  inotropes  [ x] vasodilator  [x ]IVSS fluid  to maintain MAP, perfusion, C.I.      IVCD anticoagulation with [x] Heparin  [ ] Argatroban     DM- IVCD Insulin      Vent synchrony - IVCD Precedex      Hypocalcemia      COPD     Colostomy - colon CA     ? 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   Electronically Signed: Maria D'Amico, Scribe 10-09-22 @ 06:44      Discussed with CT surgeon, Physician Assistant - Nurse Practitioner- Critical care medicine team.   Discussed at  AM / PM rounds.   Chart, labs , films reviewed.    Cumulative Critical Care Time Given Today:  CRITICAL CARE ATTENDING - CTICU    MEDICATIONS  (STANDING):  albuterol/ipratropium for Nebulization 3 milliLiter(s) Nebulizer every 6 hours  buDESOnide    Inhalation Suspension 0.5 milliGRAM(s) Inhalation every 12 hours  chlorhexidine 0.12% Liquid 15 milliLiter(s) Oral Mucosa every 12 hours  chlorhexidine 2% Cloths 1 Application(s) Topical <User Schedule>  collagenase Ointment 1 Application(s) Topical daily  dexMEDEtomidine Infusion 0.5 MICROgram(s)/kG/Hr (8.38 mL/Hr) IV Continuous <Continuous>  dextrose 50% Injectable 50 milliLiter(s) IV Push every 15 minutes  digoxin  Injectable 125 MICROGram(s) IV Push daily  fluconAZOLE IVPB 600 milliGRAM(s) IV Intermittent every 24 hours  furosemide    Tablet 20 milliGRAM(s) Oral two times a day  heparin  Infusion 600 Unit(s)/Hr (10.5 mL/Hr) IV Continuous <Continuous>  insulin lispro (ADMELOG) corrective regimen sliding scale   SubCutaneous every 6 hours  insulin NPH human recombinant 18 Unit(s) SubCutaneous every 6 hours  magnesium oxide 400 milliGRAM(s) Oral every 8 hours  metoprolol tartrate 25 milliGRAM(s) Oral two times a day  mexiletine 200 milliGRAM(s) Oral every 8 hours  multivitamin 1 Tablet(s) Oral daily  pantoprazole  Injectable 40 milliGRAM(s) IV Push daily  predniSONE   Tablet 8 milliGRAM(s) Oral <User Schedule>  sodium chloride 0.9%. 1000 milliLiter(s) (10 mL/Hr) IV Continuous <Continuous>  vancomycin    Solution 125 milliGRAM(s) Oral every 12 hours  vasopressin Infusion 0.02 Unit(s)/Min (3 mL/Hr) IV Continuous <Continuous>                                    7.5    12.98 )-----------( 219      ( 09 Oct 2022 00:25 )             24.9       10-    137  |  101  |  14  ----------------------------<  228<H>  4.2   |  30  |  0.42<L>    Ca    8.4      09 Oct 2022 00:25  Phos  2.2     10-  Mg     1.8     10    TPro  5.9<L>  /  Alb  2.8<L>  /  TBili  0.5  /  DBili  x   /  AST  19  /  ALT  64<H>  /  AlkPhos  152<H>  10-09      PT/INR - ( 09 Oct 2022 00:25 )   PT: 13.0 sec;   INR: 1.13 ratio         PTT - ( 09 Oct 2022 00:25 )  PTT:50.0 sec    Mode: CPAP with PS  FiO2: 40  PEEP: 5  PS: 15  MAP: 9  PIP: 20      Daily     Daily Weight in k.8 (09 Oct 2022 00:00)      10-07 @ :  -  10-08 @ 07:00  --------------------------------------------------------  IN: 3023.1 mL / OUT: 3395 mL / NET: -371.9 mL    10-08 @ 07:01  -  10-09 @ 06:44  --------------------------------------------------------  IN: 2694.8 mL / OUT: 4310 mL / NET: -1615.2 mL        Critically Ill patient  : [ ] preoperative ,   [x] post operative    Requires :  [x] Arterial Line   [x] Central Line  [ ] PA catheter  [ ] IABP  [ ] ECMO  [ ] LVAD  [x] Ventilator  [ ] pacemaker [ ] Impella.                      [x ] ABG's     [ x] Pulse Oxymetry Monitoring  Bedside evaluation , monitoring , treatment of hemodynamics , fluids , IVP/ IVCD meds.        Diagnosis:      Type A dissection repair, Modified bentall and hemiarch     Hypotension a/w Hypertension, requiring intravenous medication.     Hypovolemia      Respiratory Failure   Tracheostomy tomorrow    Requires chest PT, pulmonary toilet, ambu bagging, suctioning to maintain SaO2,  patent airway and treat atelectasis.     Difficult weaning process - multiple organ system involvement in critically ill patient     For Tracheostomy     CHF- acute [ ]   chronic [ x]    systolic [ ]   diastolic [x ]  Valvular [ ]          - Echo- EF -             [ ] RV dysfunction          - Cxr-cardiomegally, edema          - Clinical-  [ ]inotropes   [ ]pressors   [x ]diuresis   [ ]IABP   [ ]ECMO   [ ]LVAD   [ x]Respiratory Failure        -     Ventilator Management:  [x]AC-rest -   PC    [ x]CPAP-PS Wean ?   [ ]Trach Collar     [ ]Extubate    [ ] T-Piece  [x ]peep>5    Hemodynamic lability,  instability. Requires IVCD [ x] vasopressors on/off [ ]  inotropes  [ x] vasodilator  [x ]IVSS fluid  to maintain MAP, perfusion, C.I.      IVCD anticoagulation with [x] Heparin  [ ] Argatroban     DM- IVCD Insulin      Vent synchrony - IVCD Precedex      Hypocalcemia      COPD     Colostomy - colon CA     ? 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   Electronically Signed: Maria D'Amico, Scribe 10-09-22 @ 06:44    I, Bon Jiménez, personally performed the services described in this documentation. All medical record entries made by the scribe were at my direction and in my presence. I have reviewed the chart and agree that the record reflects my personal performance and is accurate and complete.   Bon Jiménez MD.       Discussed with CT surgeon, Physician Assistant - Nurse Practitioner- Critical care medicine team.   Discussed at  AM / PM rounds.   Chart, labs , films reviewed.    Cumulative Critical Care Time Given Today:  35 min

## 2022-10-09 NOTE — CHART NOTE - NSCHARTNOTEFT_GEN_A_CORE
FS reviewed. Recommend increase in NPH to 19 U q 6 hours. Endocrine team will follow.       Shanda Sherwood DO

## 2022-10-10 LAB
ALBUMIN SERPL ELPH-MCNC: 2.7 G/DL — LOW (ref 3.3–5)
ALBUMIN SERPL ELPH-MCNC: 3 G/DL — LOW (ref 3.3–5)
ALP SERPL-CCNC: 145 U/L — HIGH (ref 40–120)
ALP SERPL-CCNC: 172 U/L — HIGH (ref 40–120)
ALT FLD-CCNC: 57 U/L — HIGH (ref 10–45)
ALT FLD-CCNC: 67 U/L — HIGH (ref 10–45)
ANION GAP SERPL CALC-SCNC: 10 MMOL/L — SIGNIFICANT CHANGE UP (ref 5–17)
ANION GAP SERPL CALC-SCNC: 6 MMOL/L — SIGNIFICANT CHANGE UP (ref 5–17)
APTT BLD: 40.4 SEC — HIGH (ref 27.5–35.5)
AST SERPL-CCNC: 37 U/L — SIGNIFICANT CHANGE UP (ref 10–40)
AST SERPL-CCNC: 44 U/L — HIGH (ref 10–40)
BILIRUB SERPL-MCNC: 0.4 MG/DL — SIGNIFICANT CHANGE UP (ref 0.2–1.2)
BILIRUB SERPL-MCNC: 0.5 MG/DL — SIGNIFICANT CHANGE UP (ref 0.2–1.2)
BUN SERPL-MCNC: 11 MG/DL — SIGNIFICANT CHANGE UP (ref 7–23)
BUN SERPL-MCNC: 9 MG/DL — SIGNIFICANT CHANGE UP (ref 7–23)
CALCIUM SERPL-MCNC: 8.5 MG/DL — SIGNIFICANT CHANGE UP (ref 8.4–10.5)
CALCIUM SERPL-MCNC: 8.9 MG/DL — SIGNIFICANT CHANGE UP (ref 8.4–10.5)
CHLORIDE SERPL-SCNC: 100 MMOL/L — SIGNIFICANT CHANGE UP (ref 96–108)
CHLORIDE SERPL-SCNC: 102 MMOL/L — SIGNIFICANT CHANGE UP (ref 96–108)
CO2 SERPL-SCNC: 28 MMOL/L — SIGNIFICANT CHANGE UP (ref 22–31)
CO2 SERPL-SCNC: 28 MMOL/L — SIGNIFICANT CHANGE UP (ref 22–31)
CREAT SERPL-MCNC: 0.41 MG/DL — LOW (ref 0.5–1.3)
CREAT SERPL-MCNC: 0.42 MG/DL — LOW (ref 0.5–1.3)
CRP SERPL-MCNC: 75 MG/L — HIGH (ref 0–4)
CULTURE RESULTS: SIGNIFICANT CHANGE UP
CULTURE RESULTS: SIGNIFICANT CHANGE UP
EGFR: 103 ML/MIN/1.73M2 — SIGNIFICANT CHANGE UP
EGFR: 103 ML/MIN/1.73M2 — SIGNIFICANT CHANGE UP
ERYTHROCYTE [SEDIMENTATION RATE] IN BLOOD: 115 MM/HR — HIGH (ref 0–20)
GAS PNL BLDA: SIGNIFICANT CHANGE UP
GLUCOSE BLDC GLUCOMTR-MCNC: 102 MG/DL — HIGH (ref 70–99)
GLUCOSE BLDC GLUCOMTR-MCNC: 168 MG/DL — HIGH (ref 70–99)
GLUCOSE BLDC GLUCOMTR-MCNC: 229 MG/DL — HIGH (ref 70–99)
GLUCOSE SERPL-MCNC: 101 MG/DL — HIGH (ref 70–99)
GLUCOSE SERPL-MCNC: 166 MG/DL — HIGH (ref 70–99)
HCT VFR BLD CALC: 23.1 % — LOW (ref 34.5–45)
HCT VFR BLD CALC: 27.6 % — LOW (ref 34.5–45)
HGB BLD-MCNC: 7 G/DL — CRITICAL LOW (ref 11.5–15.5)
HGB BLD-MCNC: 8.1 G/DL — LOW (ref 11.5–15.5)
MAGNESIUM SERPL-MCNC: 1.8 MG/DL — SIGNIFICANT CHANGE UP (ref 1.6–2.6)
MAGNESIUM SERPL-MCNC: 1.8 MG/DL — SIGNIFICANT CHANGE UP (ref 1.6–2.6)
MCHC RBC-ENTMCNC: 24.3 PG — LOW (ref 27–34)
MCHC RBC-ENTMCNC: 24.4 PG — LOW (ref 27–34)
MCHC RBC-ENTMCNC: 29.3 GM/DL — LOW (ref 32–36)
MCHC RBC-ENTMCNC: 30.3 GM/DL — LOW (ref 32–36)
MCV RBC AUTO: 80.5 FL — SIGNIFICANT CHANGE UP (ref 80–100)
MCV RBC AUTO: 82.6 FL — SIGNIFICANT CHANGE UP (ref 80–100)
NRBC # BLD: 0 /100 WBCS — SIGNIFICANT CHANGE UP (ref 0–0)
NRBC # BLD: 0 /100 WBCS — SIGNIFICANT CHANGE UP (ref 0–0)
PHOSPHATE SERPL-MCNC: 2.5 MG/DL — SIGNIFICANT CHANGE UP (ref 2.5–4.5)
PHOSPHATE SERPL-MCNC: 3.3 MG/DL — SIGNIFICANT CHANGE UP (ref 2.5–4.5)
PLATELET # BLD AUTO: 254 K/UL — SIGNIFICANT CHANGE UP (ref 150–400)
PLATELET # BLD AUTO: 274 K/UL — SIGNIFICANT CHANGE UP (ref 150–400)
POTASSIUM SERPL-MCNC: 4 MMOL/L — SIGNIFICANT CHANGE UP (ref 3.5–5.3)
POTASSIUM SERPL-MCNC: 4.7 MMOL/L — SIGNIFICANT CHANGE UP (ref 3.5–5.3)
POTASSIUM SERPL-SCNC: 4 MMOL/L — SIGNIFICANT CHANGE UP (ref 3.5–5.3)
POTASSIUM SERPL-SCNC: 4.7 MMOL/L — SIGNIFICANT CHANGE UP (ref 3.5–5.3)
PROT SERPL-MCNC: 5.9 G/DL — LOW (ref 6–8.3)
PROT SERPL-MCNC: 6.6 G/DL — SIGNIFICANT CHANGE UP (ref 6–8.3)
RBC # BLD: 2.87 M/UL — LOW (ref 3.8–5.2)
RBC # BLD: 3.34 M/UL — LOW (ref 3.8–5.2)
RBC # FLD: 26.5 % — HIGH (ref 10.3–14.5)
RBC # FLD: 26.8 % — HIGH (ref 10.3–14.5)
SARS-COV-2 RNA SPEC QL NAA+PROBE: SIGNIFICANT CHANGE UP
SODIUM SERPL-SCNC: 136 MMOL/L — SIGNIFICANT CHANGE UP (ref 135–145)
SODIUM SERPL-SCNC: 138 MMOL/L — SIGNIFICANT CHANGE UP (ref 135–145)
SPECIMEN SOURCE: SIGNIFICANT CHANGE UP
SPECIMEN SOURCE: SIGNIFICANT CHANGE UP
WBC # BLD: 13.66 K/UL — HIGH (ref 3.8–10.5)
WBC # BLD: 16.85 K/UL — HIGH (ref 3.8–10.5)
WBC # FLD AUTO: 13.66 K/UL — HIGH (ref 3.8–10.5)
WBC # FLD AUTO: 16.85 K/UL — HIGH (ref 3.8–10.5)

## 2022-10-10 PROCEDURE — 99232 SBSQ HOSP IP/OBS MODERATE 35: CPT

## 2022-10-10 PROCEDURE — 99291 CRITICAL CARE FIRST HOUR: CPT | Mod: 24

## 2022-10-10 PROCEDURE — 71045 X-RAY EXAM CHEST 1 VIEW: CPT | Mod: 26,76

## 2022-10-10 PROCEDURE — 31600 PLANNED TRACHEOSTOMY: CPT

## 2022-10-10 PROCEDURE — 99292 CRITICAL CARE ADDL 30 MIN: CPT | Mod: 24

## 2022-10-10 DEVICE — TRACH DIC CUFF FLEX SHAFT 8.0: Type: IMPLANTABLE DEVICE | Site: NECK | Status: FUNCTIONAL

## 2022-10-10 RX ORDER — HUMAN INSULIN 100 [IU]/ML
19 INJECTION, SUSPENSION SUBCUTANEOUS EVERY 6 HOURS
Refills: 0 | Status: DISCONTINUED | OUTPATIENT
Start: 2022-10-10 | End: 2022-10-11

## 2022-10-10 RX ORDER — VANCOMYCIN HCL 1 G
1000 VIAL (EA) INTRAVENOUS EVERY 12 HOURS
Refills: 0 | Status: DISCONTINUED | OUTPATIENT
Start: 2022-10-10 | End: 2022-10-10

## 2022-10-10 RX ORDER — DEXMEDETOMIDINE HYDROCHLORIDE IN 0.9% SODIUM CHLORIDE 4 UG/ML
0.5 INJECTION INTRAVENOUS
Qty: 200 | Refills: 0 | Status: DISCONTINUED | OUTPATIENT
Start: 2022-10-10 | End: 2022-10-10

## 2022-10-10 RX ORDER — COLLAGENASE CLOSTRIDIUM HIST. 250 UNIT/G
1 OINTMENT (GRAM) TOPICAL DAILY
Refills: 0 | Status: DISCONTINUED | OUTPATIENT
Start: 2022-10-10 | End: 2022-11-22

## 2022-10-10 RX ORDER — INSULIN LISPRO 100/ML
VIAL (ML) SUBCUTANEOUS EVERY 6 HOURS
Refills: 0 | Status: DISCONTINUED | OUTPATIENT
Start: 2022-10-10 | End: 2022-11-16

## 2022-10-10 RX ORDER — METOPROLOL TARTRATE 50 MG
25 TABLET ORAL
Refills: 0 | Status: DISCONTINUED | OUTPATIENT
Start: 2022-10-10 | End: 2022-10-15

## 2022-10-10 RX ORDER — DIGOXIN 250 MCG
125 TABLET ORAL DAILY
Refills: 0 | Status: DISCONTINUED | OUTPATIENT
Start: 2022-10-10 | End: 2022-10-16

## 2022-10-10 RX ORDER — FLUCONAZOLE 150 MG/1
600 TABLET ORAL EVERY 24 HOURS
Refills: 0 | Status: DISCONTINUED | OUTPATIENT
Start: 2022-10-10 | End: 2022-11-15

## 2022-10-10 RX ORDER — DEXTROSE 50 % IN WATER 50 %
50 SYRINGE (ML) INTRAVENOUS
Refills: 0 | Status: DISCONTINUED | OUTPATIENT
Start: 2022-10-10 | End: 2022-11-03

## 2022-10-10 RX ORDER — IPRATROPIUM/ALBUTEROL SULFATE 18-103MCG
3 AEROSOL WITH ADAPTER (GRAM) INHALATION EVERY 6 HOURS
Refills: 0 | Status: DISCONTINUED | OUTPATIENT
Start: 2022-10-10 | End: 2022-11-22

## 2022-10-10 RX ORDER — SODIUM CHLORIDE 9 MG/ML
1000 INJECTION, SOLUTION INTRAVENOUS
Refills: 0 | Status: DISCONTINUED | OUTPATIENT
Start: 2022-10-10 | End: 2022-10-10

## 2022-10-10 RX ORDER — MAGNESIUM SULFATE 500 MG/ML
1 VIAL (ML) INJECTION ONCE
Refills: 0 | Status: COMPLETED | OUTPATIENT
Start: 2022-10-10 | End: 2022-10-10

## 2022-10-10 RX ORDER — MAGNESIUM SULFATE 500 MG/ML
2 VIAL (ML) INJECTION ONCE
Refills: 0 | Status: COMPLETED | OUTPATIENT
Start: 2022-10-10 | End: 2022-10-10

## 2022-10-10 RX ORDER — VANCOMYCIN HCL 1 G
125 VIAL (EA) INTRAVENOUS EVERY 12 HOURS
Refills: 0 | Status: DISCONTINUED | OUTPATIENT
Start: 2022-10-10 | End: 2022-10-17

## 2022-10-10 RX ORDER — MEXILETINE HYDROCHLORIDE 150 MG/1
200 CAPSULE ORAL EVERY 8 HOURS
Refills: 0 | Status: DISCONTINUED | OUTPATIENT
Start: 2022-10-10 | End: 2022-11-22

## 2022-10-10 RX ORDER — POTASSIUM CHLORIDE 20 MEQ
10 PACKET (EA) ORAL
Refills: 0 | Status: COMPLETED | OUTPATIENT
Start: 2022-10-10 | End: 2022-10-10

## 2022-10-10 RX ORDER — FUROSEMIDE 40 MG
20 TABLET ORAL
Refills: 0 | Status: DISCONTINUED | OUTPATIENT
Start: 2022-10-10 | End: 2022-10-11

## 2022-10-10 RX ORDER — VASOPRESSIN 20 [USP'U]/ML
0.1 INJECTION INTRAVENOUS
Qty: 40 | Refills: 0 | Status: DISCONTINUED | OUTPATIENT
Start: 2022-10-10 | End: 2022-10-10

## 2022-10-10 RX ORDER — VANCOMYCIN HCL 1 G
VIAL (EA) INTRAVENOUS
Refills: 0 | Status: DISCONTINUED | OUTPATIENT
Start: 2022-10-10 | End: 2022-10-10

## 2022-10-10 RX ORDER — PANTOPRAZOLE SODIUM 20 MG/1
40 TABLET, DELAYED RELEASE ORAL DAILY
Refills: 0 | Status: DISCONTINUED | OUTPATIENT
Start: 2022-10-10 | End: 2022-11-22

## 2022-10-10 RX ORDER — NICARDIPINE HYDROCHLORIDE 30 MG/1
5 CAPSULE, EXTENDED RELEASE ORAL
Qty: 40 | Refills: 0 | Status: DISCONTINUED | OUTPATIENT
Start: 2022-10-10 | End: 2022-10-10

## 2022-10-10 RX ORDER — VANCOMYCIN HCL 1 G
1000 VIAL (EA) INTRAVENOUS ONCE
Refills: 0 | Status: COMPLETED | OUTPATIENT
Start: 2022-10-10 | End: 2022-10-10

## 2022-10-10 RX ORDER — BUDESONIDE, MICRONIZED 100 %
0.5 POWDER (GRAM) MISCELLANEOUS EVERY 12 HOURS
Refills: 0 | Status: DISCONTINUED | OUTPATIENT
Start: 2022-10-10 | End: 2022-11-22

## 2022-10-10 RX ORDER — VANCOMYCIN HCL 1 G
1000 VIAL (EA) INTRAVENOUS EVERY 12 HOURS
Refills: 0 | Status: DISCONTINUED | OUTPATIENT
Start: 2022-10-10 | End: 2022-10-14

## 2022-10-10 RX ORDER — MAGNESIUM OXIDE 400 MG ORAL TABLET 241.3 MG
400 TABLET ORAL EVERY 8 HOURS
Refills: 0 | Status: DISCONTINUED | OUTPATIENT
Start: 2022-10-10 | End: 2022-11-09

## 2022-10-10 RX ORDER — CHLORHEXIDINE GLUCONATE 213 G/1000ML
1 SOLUTION TOPICAL
Refills: 0 | Status: DISCONTINUED | OUTPATIENT
Start: 2022-10-10 | End: 2022-11-10

## 2022-10-10 RX ORDER — CHLORHEXIDINE GLUCONATE 213 G/1000ML
15 SOLUTION TOPICAL EVERY 12 HOURS
Refills: 0 | Status: DISCONTINUED | OUTPATIENT
Start: 2022-10-10 | End: 2022-10-12

## 2022-10-10 RX ORDER — SODIUM CHLORIDE 9 MG/ML
1000 INJECTION INTRAMUSCULAR; INTRAVENOUS; SUBCUTANEOUS
Refills: 0 | Status: DISCONTINUED | OUTPATIENT
Start: 2022-10-10 | End: 2022-11-10

## 2022-10-10 RX ORDER — ALBUMIN HUMAN 25 %
250 VIAL (ML) INTRAVENOUS ONCE
Refills: 0 | Status: COMPLETED | OUTPATIENT
Start: 2022-10-10 | End: 2022-10-10

## 2022-10-10 RX ADMIN — Medication 1 TABLET(S): at 17:40

## 2022-10-10 RX ADMIN — MAGNESIUM OXIDE 400 MG ORAL TABLET 400 MILLIGRAM(S): 241.3 TABLET ORAL at 21:16

## 2022-10-10 RX ADMIN — MAGNESIUM OXIDE 400 MG ORAL TABLET 400 MILLIGRAM(S): 241.3 TABLET ORAL at 05:02

## 2022-10-10 RX ADMIN — Medication 125 MILLILITER(S): at 16:34

## 2022-10-10 RX ADMIN — Medication 3 MILLILITER(S): at 00:55

## 2022-10-10 RX ADMIN — Medication 3 MILLILITER(S): at 17:56

## 2022-10-10 RX ADMIN — CHLORHEXIDINE GLUCONATE 1 APPLICATION(S): 213 SOLUTION TOPICAL at 05:03

## 2022-10-10 RX ADMIN — DEXMEDETOMIDINE HYDROCHLORIDE IN 0.9% SODIUM CHLORIDE 8.38 MICROGRAM(S)/KG/HR: 4 INJECTION INTRAVENOUS at 08:10

## 2022-10-10 RX ADMIN — Medication 100 GRAM(S): at 01:49

## 2022-10-10 RX ADMIN — Medication 63.75 MILLIMOLE(S): at 02:03

## 2022-10-10 RX ADMIN — Medication 25 MILLIGRAM(S): at 05:03

## 2022-10-10 RX ADMIN — Medication 125 MICROGRAM(S): at 21:30

## 2022-10-10 RX ADMIN — Medication 3 MILLILITER(S): at 11:18

## 2022-10-10 RX ADMIN — Medication 0.5 MILLIGRAM(S): at 06:25

## 2022-10-10 RX ADMIN — MEXILETINE HYDROCHLORIDE 200 MILLIGRAM(S): 150 CAPSULE ORAL at 21:14

## 2022-10-10 RX ADMIN — FLUCONAZOLE 150 MILLIGRAM(S): 150 TABLET ORAL at 08:09

## 2022-10-10 RX ADMIN — FLUCONAZOLE 150 MILLIGRAM(S): 150 TABLET ORAL at 21:29

## 2022-10-10 RX ADMIN — NICARDIPINE HYDROCHLORIDE 25 MG/HR: 30 CAPSULE, EXTENDED RELEASE ORAL at 02:03

## 2022-10-10 RX ADMIN — SODIUM CHLORIDE 10 MILLILITER(S): 9 INJECTION INTRAMUSCULAR; INTRAVENOUS; SUBCUTANEOUS at 08:10

## 2022-10-10 RX ADMIN — CHLORHEXIDINE GLUCONATE 15 MILLILITER(S): 213 SOLUTION TOPICAL at 17:39

## 2022-10-10 RX ADMIN — Medication 250 MILLIGRAM(S): at 17:40

## 2022-10-10 RX ADMIN — Medication 8 MILLIGRAM(S): at 05:02

## 2022-10-10 RX ADMIN — Medication 20 MILLIGRAM(S): at 21:59

## 2022-10-10 RX ADMIN — PANTOPRAZOLE SODIUM 40 MILLIGRAM(S): 20 TABLET, DELAYED RELEASE ORAL at 13:39

## 2022-10-10 RX ADMIN — Medication 25 GRAM(S): at 21:12

## 2022-10-10 RX ADMIN — Medication 20 MILLIGRAM(S): at 05:03

## 2022-10-10 RX ADMIN — Medication 50 MILLIEQUIVALENT(S): at 12:19

## 2022-10-10 RX ADMIN — MEXILETINE HYDROCHLORIDE 200 MILLIGRAM(S): 150 CAPSULE ORAL at 05:03

## 2022-10-10 RX ADMIN — Medication 50 MILLIEQUIVALENT(S): at 13:39

## 2022-10-10 RX ADMIN — Medication 125 MILLIGRAM(S): at 05:02

## 2022-10-10 RX ADMIN — Medication 50 MILLIEQUIVALENT(S): at 10:45

## 2022-10-10 RX ADMIN — Medication 2: at 17:37

## 2022-10-10 RX ADMIN — Medication 1 APPLICATION(S): at 13:49

## 2022-10-10 RX ADMIN — SODIUM CHLORIDE 25 MILLILITER(S): 9 INJECTION, SOLUTION INTRAVENOUS at 08:09

## 2022-10-10 RX ADMIN — Medication 125 MILLIGRAM(S): at 17:40

## 2022-10-10 RX ADMIN — CHLORHEXIDINE GLUCONATE 15 MILLILITER(S): 213 SOLUTION TOPICAL at 05:02

## 2022-10-10 RX ADMIN — Medication 250 MILLIGRAM(S): at 04:58

## 2022-10-10 RX ADMIN — Medication 8 MILLIGRAM(S): at 21:16

## 2022-10-10 RX ADMIN — Medication 0.5 MILLIGRAM(S): at 17:55

## 2022-10-10 RX ADMIN — Medication 3 MILLILITER(S): at 06:25

## 2022-10-10 RX ADMIN — Medication 4: at 14:10

## 2022-10-10 NOTE — PROGRESS NOTE ADULT - TIME BILLING
as above: for trach today-no weaning--remains in resp failure-sepsis ifryyuovup-FMOS-tdrhkix vented/ABX-stable CV status  multifactorial dyspnea-resp failure-severe persistent asthma, TBM s/p tracheoplasty, s/p Aortic aneurysm repair, Bronchitis (proteus)-O2-keep 90%; wean as able off sedation  (hoping to avoid trach if able)  severe persistent asthma--medrol 8mg changed to hydrocortisone 100 q 8, singulair 10, duoneb q 6, budes .5 bid, tezspire 8/29-next 9/29  TBM-s/p tracheoplasty--accapella, unable to use vest due to surgery  s/p Aortic aneurysm repair--as per CTS staff care  AF-on heparin--eventual eliquis rx               CV-improved cardene-MAP above 60  DVT R-IJ-on heparin rx  ID-s/p proteus bronchitis-s/p meropenum changed to ceftriaxone and back to meropenem/vanco- off of ABX as of 9/19--restart of ABX 9/27-meropenem/vanco-NOW solely vanco for MRSE sepsis and fluconazole as per ID  PT-OOB as able (on hold)                              GI-TF as able--f/up LFTs       VC dysfunction--aspiration precautions-for trach 10/10  GOC                                    Heme onc f/up colon ca  prog--critical in PICU                PT-when/if improved    Eulalio Allison MD-Pulmonary   [phone]

## 2022-10-10 NOTE — CONSULT NOTE ADULT - SUBJECTIVE AND OBJECTIVE BOX
Orthopedics    Patient is a 72yFemale who is admitted to [facility] w/ a aortic dissection s/p repair w/ respiratory failure. Patient was intubated and is now s/p intubation and trach today. Orthopedics consulted for wound over the R elbow and r/o elbow septic arthritis. Daughter at bedside stating patient has RUE swelling 2/2 to thrombosis  and complains of pain when the R arm is moved occasionally. No other orthopedic concerns at this time.    Atrial fibrillation    Diabetes    Hypertension    COPD (chronic obstructive pulmonary disease)    Adrenal insufficiency    Aortic insufficiency    Pelvic fracture    Asthma    Hypertension    Tracheobronchomalacia    Colorectal cancer    Aortic disease    Rectal bleeding    Seizure    DVT (deep venous thrombosis)    TIA (transient ischemic attack)            aspirin (Short breath)  Avelox (Short breath; Pruritus)  cefepime (Anaphylaxis)  codeine (Short breath)  Dilaudid (Short breath)  iodine (Short breath; Swelling)  penicillin (Anaphylaxis)  shellfish (Anaphylaxis)  tetanus toxoid (Short breath)  Valium (Short breath)      PHYSICAL EXAM:  T(C): 36.6 (10-10-22 @ 17:00), Max: 38.2 (10-09-22 @ 20:00)  HR: 63 (10-10-22 @ 18:10) (59 - 79)  BP: --  RR: 17 (10-10-22 @ 18:00) (10 - 24)  SpO2: 100% (10-10-22 @ 18:10) (96% - 100%)    Gen: NAD  RIGHT Upper Extremity:   Skin with necrotic wound over the olecranon, arm diffusely edemic, no palpable R elbow effusion  NTTP over the bony prominences of the shoulder/elbow/wrist/hand/fingers  Painless passive/active ROM of the elbow  Grossly motor and sensation intact  + radial pulse  Compartments soft and compressible      Secondary Survey:   No TTP over bony prominences, SILT, palpable pulses, full/painless A/PROM, compartments soft. No TTP over spinous processes or paraspinal muscles at C/T/L spine. No palpable step off. No other injuries or complaints.      A/P: 72F w/ Decubitus ulcer R elbow and verly low concern for septic arthritis of elbow    FU XR R elbow  No palpable effusion, Good ROM of elbow/ non painful  Low concern for septic arthritis of elbow  Rec wound care for elbow decubitus ulcer  Analgesia prn  WBAT  DVT ppx  PT/OT as tolerated  No acute orthopedic surgical intervention indicated at this time  Orthopedically stable for outpatient FU  Will FU xrays

## 2022-10-10 NOTE — BRIEF OPERATIVE NOTE - OPERATION/FINDINGS
Trachea midline
Aortic XClamp: 181    |    Total CPB: 198  SACP: 28min | Circ Arrest: 9 min    Procedure:  Modified Bentall Procedure with 21mm Konnect graft  Hemiarch replacement with 28mm hemashield graft  Type A aortic dissection repair  LAAL with AtriClip ____mm    _____ greater saphenous vein harvested endoscopically by ________ PA

## 2022-10-10 NOTE — PROGRESS NOTE ADULT - SUBJECTIVE AND OBJECTIVE BOX
CHIEF COMPLAINT: f/up sob, chronic resp failure, TBM, severe persistent asthma, VC dysfunction, Type A aortic dissection s/p repair w/modified "Bentall procedure and hemiarch replacement 9/6-vented and sedated -no weaning for trach today    Interval Events: trach planned    REVIEW OF SYSTEMS:  Constitutional: No fevers or chills. No weight loss. No fatigue or generalized malaise.  Eyes: No itching or discharge from the eyes  ENT: No ear pain. No ear discharge. No nasal congestion. No post nasal drip. No epistaxis. No throat pain. No sore throat. No difficulty swallowing.   CV: No chest pain. No palpitations. No lightheadedness or dizziness.   Resp: No dyspnea at rest. No dyspnea on exertion. No orthopnea. No wheezing. No cough. No stridor. No sputum production. No chest pain with respiration.  GI: No nausea. No vomiting. No diarrhea.  MSK: No joint pain or pain in any extremities  Integumentary: No skin lesions. No pedal edema.  Neurological: No gross motor weakness. No sensory changes.  [ ] All other systems negative  [+ ] Unable to assess ROS because sedated________    OBJECTIVE:  ICU Vital Signs Last 24 Hrs  T(C): 37.5 (10 Oct 2022 04:00), Max: 38.2 (09 Oct 2022 20:00)  T(F): 99.5 (10 Oct 2022 04:00), Max: 100.8 (09 Oct 2022 20:00)  HR: 67 (10 Oct 2022 03:00) (63 - 88)  BP: --  BP(mean): --  ABP: 133/44 (10 Oct 2022 03:00) (133/44 - 203/75)  ABP(mean): 66 (10 Oct 2022 03:00) (66 - 117)  RR: 13 (10 Oct 2022 03:00) (10 - 24)  SpO2: 100% (10 Oct 2022 03:00) (98% - 100%)    O2 Parameters below as of 10 Oct 2022 04:00  Patient On (Oxygen Delivery Method): ventilator    O2 Concentration (%): 50      Mode: AC/ CMV (Assist Control/ Continuous Mandatory Ventilation), RR (machine): 16, TV (machine): 450, FiO2: 40, PEEP: 5, ITime: 1, MAP: 7, PIP: 19    10-08 @ 07:01  -  10-09 @ 07:00  --------------------------------------------------------  IN: 2698.2 mL / OUT: 4310 mL / NET: -1611.8 mL    10-09 @ 07:01  -  10-10 @ 04:46  --------------------------------------------------------  IN: 1516.9 mL / OUT: 3935 mL / NET: -2418.1 mL      CAPILLARY BLOOD GLUCOSE  219 (09 Oct 2022 17:00)      POCT Blood Glucose.: 113 mg/dL (09 Oct 2022 23:11)      PHYSICAL EXAM:  General: sedated   HEENT: Atraumatic, normocephalic.                 Mallampatti Grade 3                No nasal congestion.                No tonsillar or pharyngeal exudates.  Lymph Nodes: No palpable lymphadenopathy  Neck: No JVD. No carotid bruit.   Respiratory: abnormal chest expansion                         Normal percussion                         Normal and equal air entry                         No wheeze, rhonchi or rales.  Cardiovascular: S1 S2 normal. No murmurs, rubs or gallops.   Abdomen: Soft, non-tender, non-distended. No organomegaly. Normoactive bowel sounds.  Extremities: Warm to touch. Peripheral pulse palpable. No pedal edema.   Skin: No rashes or skin lesions  Neurological: Motor and sensory examination unable  Psychiatry: unable    HOSPITAL MEDICATIONS:  MEDICATIONS  (STANDING):  albuterol/ipratropium for Nebulization 3 milliLiter(s) Nebulizer every 6 hours  buDESOnide    Inhalation Suspension 0.5 milliGRAM(s) Inhalation every 12 hours  chlorhexidine 0.12% Liquid 15 milliLiter(s) Oral Mucosa every 12 hours  chlorhexidine 2% Cloths 1 Application(s) Topical <User Schedule>  collagenase Ointment 1 Application(s) Topical daily  dexMEDEtomidine Infusion 0.5 MICROgram(s)/kG/Hr (8.38 mL/Hr) IV Continuous <Continuous>  dextrose 50% Injectable 50 milliLiter(s) IV Push every 15 minutes  digoxin  Injectable 125 MICROGram(s) IV Push daily  fluconAZOLE IVPB 600 milliGRAM(s) IV Intermittent every 24 hours  furosemide    Tablet 20 milliGRAM(s) Oral two times a day  heparin  Infusion 600 Unit(s)/Hr (10.5 mL/Hr) IV Continuous <Continuous>  insulin lispro (ADMELOG) corrective regimen sliding scale   SubCutaneous every 6 hours  insulin NPH human recombinant 19 Unit(s) SubCutaneous every 6 hours  magnesium oxide 400 milliGRAM(s) Oral every 8 hours  methylPREDNISolone 8 milliGRAM(s) Oral daily  metoprolol tartrate 25 milliGRAM(s) Oral two times a day  mexiletine 200 milliGRAM(s) Oral every 8 hours  multivitamin 1 Tablet(s) Oral daily  niCARdipine Infusion 5 mG/Hr (25 mL/Hr) IV Continuous <Continuous>  pantoprazole  Injectable 40 milliGRAM(s) IV Push daily  sodium chloride 0.9%. 1000 milliLiter(s) (10 mL/Hr) IV Continuous <Continuous>  vancomycin    Solution 125 milliGRAM(s) Oral every 12 hours  vancomycin  IVPB      vancomycin  IVPB 1000 milliGRAM(s) IV Intermittent once  vancomycin  IVPB 1000 milliGRAM(s) IV Intermittent every 12 hours    MEDICATIONS  (PRN):  simethicone drops 80 milliGRAM(s) Oral every 12 hours PRN Gas      LABS:                        8.1    16.85 )-----------( 274      ( 10 Oct 2022 00:57 )             27.6     10-10    138  |  100  |  11  ----------------------------<  101<H>  4.0   |  28  |  0.41<L>    Ca    8.9      10 Oct 2022 00:57  Phos  2.5     10-10  Mg     1.8     10-10    TPro  6.6  /  Alb  3.0<L>  /  TBili  0.4  /  DBili  x   /  AST  44<H>  /  ALT  67<H>  /  AlkPhos  172<H>  10-10    PT/INR - ( 09 Oct 2022 00:25 )   PT: 13.0 sec;   INR: 1.13 ratio         PTT - ( 10 Oct 2022 00:57 )  PTT:40.4 sec    Arterial Blood Gas:  10-10 @ 00:00  7.43/47/190/31/98.2/6.2  ABG lactate: --  Arterial Blood Gas:  10-09 @ 17:13  7.48/42/134/31/98.1/7.1  ABG lactate: --  Arterial Blood Gas:  10-09 @ 00:07  7.49/41/138/31/98.5/7.2  ABG lactate: --  Arterial Blood Gas:  10-08 @ 19:27  7.48/43/110/32/97.8/7.8  ABG lactate: --  Arterial Blood Gas:  10-08 @ 16:59  7.49/41/134/31/98.2/7.2  ABG lactate: --  Arterial Blood Gas:  10-08 @ 16:20  7.47/33/135/24/99.2/0.4  ABG lactate: --        MICROBIOLOGY:     RADIOLOGY:  [ ] Reviewed and interpreted by me    Point of Care Ultrasound Findings:    PFT:    EKG:

## 2022-10-10 NOTE — PROGRESS NOTE ADULT - SUBJECTIVE AND OBJECTIVE BOX
ENT ISSUE/POD: S/P #8 Portex  Cuffed Tracheostomy POD # 0.    HPI: 70 YO F with PMH of  COPD, Chronic Adrenal Insufficiency on Chronic prednisone, history of colorectal cancer s/p resection (colostomy bag), Hx of CAD, Chronic A fib on Eliquis, and tracheomalacia and multiple intubations, recent dx of OM presented to ED at [facility] with epigastric pain, belching and central chest pain. Found on CTA to have type A aortic dissection and transferred to [facility] for surgical evaluation by Dr. Cabrera. ENT called to evaluate for tracheostomy.  Pt failed weaning trials and is not a candidate for extubation as per primary team. Now S/P #8 Portex Cuffed  Tracheostomy POD # 0.  Doing well on the ventilator. No bleeding noted. PEEP:5, FiO2:40%.    PAST MEDICAL & SURGICAL HISTORY:  Atrial fibrillation paroxysmal, on eliquis  Diabetes Type 2  COPD (chronic obstructive pulmonary disease)  Adrenal insufficiency  Medrol daily for over 50 years  Aortic insufficiency  moderate AR on echo 5/3/2018  Pelvic fracture  Asthma  Tracheobronchomalacia  diagnosed 2015, s/p bronchial thermoplasty 2016 (Dr Zapien); recent bronchoscopy 6/5/2018 revealed no evidence of tracheobronchomalacia in trachea or bronchial tubes  Colorectal cancer  4/2018- last treatment , chemo and radiation  Rectal bleeding  Seizure x 1 1/7/18  DVT (deep venous thrombosis) 15-20 years ago, took coumadin  TIA (transient ischemic attack)  multiple, last 5 years ago - presents as right-sided weakness  History of partial hysterectomy 30 years ago - fibroids  H/O total knee replacement, bilateral 5 years ago  History of sinus surgery  multiple sinus surgeries  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  H/O pelvic surgery 5 years ago - s/p fracture  History of tracheomalacia  2015 - attempted tracheal stenting ([facility])- course complicated by obstruction, respiratory failure, multiple CPR attempts -  stent discontinued; 10/20/2016 Tracheobronchoplasty (Prolene Mesh) performed at [facility] by Dr Zapien  S/P bronchoscopy  6/5/2018 - [place] (Dr Zapien) no evidence of tracheobronchomalacia in trachea or bronchial tubes  Rectal bleeding  exam under anesthesia ([facility]) 2/2018    Allergies  aspirin (Short breath)  Avelox (Short breath; Pruritus)  cefepime (Anaphylaxis)  codeine (Short breath)  Dilaudid (Short breath)  iodine (Short breath; Swelling)  penicillin (Anaphylaxis)  shellfish (Anaphylaxis)  tetanus toxoid (Short breath)  Valium (Short breath)    Intolerances    MEDICATIONS  (STANDING):  albuterol/ipratropium for Nebulization 3 milliLiter(s) Nebulizer every 6 hours  buDESOnide    Inhalation Suspension 0.5 milliGRAM(s) Inhalation every 12 hours  chlorhexidine 0.12% Liquid 15 milliLiter(s) Oral Mucosa every 12 hours  chlorhexidine 2% Cloths 1 Application(s) Topical <User Schedule>  collagenase Ointment 1 Application(s) Topical daily  dextrose 50% Injectable 50 milliLiter(s) IV Push every 15 minutes  digoxin  Injectable 125 MICROGram(s) IV Push daily  fluconAZOLE IVPB 600 milliGRAM(s) IV Intermittent every 24 hours  furosemide    Tablet 20 milliGRAM(s) Oral two times a day  insulin lispro (ADMELOG) corrective regimen sliding scale   SubCutaneous every 6 hours  insulin NPH human recombinant 19 Unit(s) SubCutaneous every 6 hours  magnesium oxide 400 milliGRAM(s) Oral every 8 hours  methylPREDNISolone 8 milliGRAM(s) Oral daily  metoprolol tartrate 25 milliGRAM(s) Oral two times a day  mexiletine 200 milliGRAM(s) Oral every 8 hours  multivitamin 1 Tablet(s) Oral daily  pantoprazole  Injectable 40 milliGRAM(s) IV Push daily  sodium chloride 0.9%. 1000 milliLiter(s) (10 mL/Hr) IV Continuous <Continuous>  vancomycin    Solution 125 milliGRAM(s) Oral every 12 hours  vancomycin  IVPB 1000 milliGRAM(s) IV Intermittent every 12 hours    MEDICATIONS  (PRN):    Social History: SEE CONSULT.   Family history: SEE CONSULT.     ROS:   ENT: Unable to Obtain.    Vital Signs Last 24 Hrs  T(C): 37.5 (11 Oct 2022 00:00), Max: 37.6 (10 Oct 2022 20:00)  T(F): 99.5 (11 Oct 2022 00:00), Max: 99.7 (10 Oct 2022 20:00)  HR: 88 (11 Oct 2022 00:00) (59 - 88)  BP: --  BP(mean): --  RR: 15 (11 Oct 2022 00:00) (10 - 22)  SpO2: 98% (11 Oct 2022 00:00) (96% - 100%)    Parameters below as of 11 Oct 2022 00:00  Patient On (Oxygen Delivery Method): ventilator    O2 Concentration (%): 40                        7.0    13.66 )-----------( 254      ( 10 Oct 2022 16:51 )             23.1    10-10    136  |  102  |  9   ----------------------------<  166<H>  4.7   |  28  |  0.42<L>    Ca    8.5      10 Oct 2022 16:51  Phos  3.3     10-10  Mg     1.8     10-10  TPro  5.9<L>  /  Alb  2.7<L>  /  TBili  0.5  /  DBili  x   /  AST  37  /  ALT  57<H>  /  AlkPhos  145<H>  10-10   PT/INR - ( 09 Oct 2022 00:25 )   PT: 13.0 sec;   INR: 1.13 ratio     PTT - ( 10 Oct 2022 00:57 )  PTT:40.4 sec    PHYSICAL EXAM:  Gen: NAD, On Vent.   Skin: No rashes, bruises, or lesions.  Head: NC/AT.  Face: no edema, erythema, or fluctuance. Parotid glands soft without mass.  Eyes: no scleral injection.  Nose: Nares bilaterally patent, no discharge  Mouth: No Stridor / Drooling / Trismus.  Mucosa moist, tongue/uvula midline, oropharynx clear  Neck: #8 Portex Cuffed Trach  secured with sutures x 4 and Umbilical Tie. No hematoma/crepitus. Flat, supple, no lymphadenopathy, trachea midline, no masses.  Lymphatic: No lymphadenopathy  Resp: On Vent.   Neuro: Unable to obtain.

## 2022-10-10 NOTE — PRE-ANESTHESIA EVALUATION ADULT - LAST CARDIAC ANGIOGRAM/IMAGING
Hospitalist X-cover    Called about elevated glucose of 437.  Did not get his usual meds reconcilled yet.  Will give lantus 10 units x 1 and further adjustments by rounding hospitalistJESUS MD  
denies

## 2022-10-10 NOTE — PROGRESS NOTE ADULT - SUBJECTIVE AND OBJECTIVE BOX
Patient seen and examined at the bedside.    Remained critically ill on continuous ICU monitoring.    OBJECTIVE:  Vital Signs Last 24 Hrs  T(C): 36.3 (10 Oct 2022 08:00), Max: 38.2 (09 Oct 2022 20:00)  T(F): 97.3 (10 Oct 2022 08:00), Max: 100.8 (09 Oct 2022 20:00)  HR: 65 (10 Oct 2022 09:20) (59 - 88)  BP: --  BP(mean): --  RR: 0 (10 Oct 2022 09:00) (0 - 24)  SpO2: 100% (10 Oct 2022 09:20) (97% - 100%)    Parameters below as of 10 Oct 2022 08:00  Patient On (Oxygen Delivery Method): ventilator    O2 Concentration (%): 40      Physical Exam:   General: intubated  Neurology: somnolent, but able to follow simple commands  ENT/Neck: Neck supple, trachea midline, No JVD  Respiratory: rhonchi throughout    CV: S1S2, no murmurs        [x] Sinus Rhythm   Abdominal: Soft, NT, ND, colostomy in place  Extremities: +4 RUE edema, 3+LUE edema, 2 + pedal edema noted, + peripheral pulses   Skin: Dry dressing over right heel, no Rashes, Hematoma, Ecchymosis                        Assessment:  71F PMH DM, COPD, Chronic Adrenal Insufficiency on Chronic prednisone, history of colorectal cancer s/p resection (colostomy bag), Hx of CAD, Chronic A fib on Eliquis, and tracheomalacia and multiple intubations, recent dx of OM presented to ED at [facility] this morning with epigastric pain, belching and central chest pain. Found on CTA to have type A aortic dissection and transferred to [facility] for surgical evaluation by Dr. Cabrera.     Ascending aortic dissection s/p type A dissection repair and biobentall on 9/7   Hypovolemia   Post op respiratory failure   Acute blood loss anemia  Stress hyperglycemia   Leukocytosis   RIJ thrombus  MRSA PNA        Plan:   ***Neuro***  [x] Sedated with [x] Precedex   Off sedation follows simple commands      ***Cardiovascular***  Limited TTE on 10/1: EF 69%, Hyperdynamic left ventricular systolic function. There is hypokinesis of the mid-base inferior wall. Nml RV fxn.   CT chest on 9/28: No CT evidence of pulmonary embolism.Status post repair of ascending aortic dissection with a stable dissection flap originating from the aortic arch and extending into the infrarenal abdominal aorta,  B/l UE duplex on 10/5: As before, there is an occluded RIGHT IJ vein with thrombosis extending to brachiocephalic vein. Superficial thrombophlebitis of the LEFT cephalic vein.  Invasive hemodynamic monitoring, assess perfusion indices   SR / CVP 3/ MAP 82/Hct 27.6/ Lactate 1.2  [x] Cardene 5mG/Hr  Rate control with Mexiletine Digoxin, and Lopressor   Serial EKG and cardiac enzymes       ***Pulmonary***  TC today with ENT  Bronch after TC  Post op vent management   Titration of FiO2 and PEEP, follow SpO2, CXR, blood gasses   Continue bronchodilators duoneb, pulmacort  Mode: CPAP with PS  FiO2: 40  PEEP: 5  PS: 10  MAP: 9  PIP: 16              ***GI***  [x] Diet:  TFs, Glucerna 1.5 @ 55cc/hr   [x] Protonix         ***Renal***  Continue to monitor I/Os, BUN/Creatinine.   Replete lytes PRN  Seven present   Diuresis with Lasix       ***ID***  SCx on 10/4+Methicillin resistant Staphylococcus aureus, c/w IVPB Vancomycin  9/27 urine cult Candida Glabarata on flucanazole  PO Vancomycin solution for C.diff prophylaxis       ***Endocrine***  [x]  DM : HbA1c 9.4%                - [x] ISS  [x] NPH              - Need tight glycemic control to prevent wound infection.        Patient requires continuous monitoring with bedside rhythm monitoring, pulse oximetry monitoring, and continuous central venous and arterial pressure monitoring; and intermittent blood gas analysis. Care plan discussed with the ICU care team.   Patient remained critical, at risk for life threatening decompensation.    I have spent 30 minutes providing critical care management to this patient.    By signing my name below, I, Kieran Plascencia, attest that this documentation has been prepared under the direction and in the presence of Simone Garcia NP   Electronically signed: Rogers Cordero, 10-10-22 @ 10:16    Radha GAVIRIA Mirabile NP, personally performed the services described in this documentation. all medical record entries made by the rogers were at my direction and in my presence. I have reviewed the chart and agree that the record reflects my personal performance and is accurate and complete  Electronically signed: Simone Garcia NP  Patient seen and examined at the bedside.    Remained critically ill on continuous ICU monitoring.    OBJECTIVE:  Vital Signs Last 24 Hrs  T(C): 36.3 (10 Oct 2022 08:00), Max: 38.2 (09 Oct 2022 20:00)  T(F): 97.3 (10 Oct 2022 08:00), Max: 100.8 (09 Oct 2022 20:00)  HR: 65 (10 Oct 2022 09:20) (59 - 88)  BP: --  BP(mean): --  RR: 0 (10 Oct 2022 09:00) (0 - 24)  SpO2: 100% (10 Oct 2022 09:20) (97% - 100%)    Parameters below as of 10 Oct 2022 08:00  Patient On (Oxygen Delivery Method): ventilator    O2 Concentration (%): 40      Physical Exam:   General: intubated  Neurology: somnolent, but able to follow simple commands  ENT/Neck: Neck supple, trachea midline, No JVD  Respiratory: rhonchi throughout    CV: S1S2, no murmurs        [x] Sinus Rhythm   Abdominal: Soft, NT, ND, colostomy in place  Extremities: +4 RUE edema, 3+LUE edema, 2 + pedal edema noted, + peripheral pulses   Skin: Dry dressing over right heel, no Rashes, Hematoma, Ecchymosis                        Assessment:  71F PMH DM, COPD, Chronic Adrenal Insufficiency on Chronic prednisone, history of colorectal cancer s/p resection (colostomy bag), Hx of CAD, Chronic A fib on Eliquis, and tracheomalacia and multiple intubations, recent dx of OM presented to ED at [facility] this morning with epigastric pain, belching and central chest pain. Found on CTA to have type A aortic dissection and transferred to [facility] for surgical evaluation by Dr. Cabrera.     Ascending aortic dissection s/p type A dissection repair and biobentall on 9/7   Hypovolemia   Post op respiratory failure   Acute blood loss anemia  Stress hyperglycemia   Leukocytosis   RIJ thrombus  MRSA PNA        Plan:   ***Neuro***  [x] Sedated with [x] Precedex   Off sedation follows simple commands      ***Cardiovascular***  Limited TTE on 10/1: EF 69%, Hyperdynamic left ventricular systolic function. There is hypokinesis of the mid-base inferior wall. Nml RV fxn.   CT chest on 9/28: No CT evidence of pulmonary embolism.Status post repair of ascending aortic dissection with a stable dissection flap originating from the aortic arch and extending into the infrarenal abdominal aorta,  B/l UE duplex on 10/5: As before, there is an occluded RIGHT IJ vein with thrombosis extending to brachiocephalic vein. Superficial thrombophlebitis of the LEFT cephalic vein.  Invasive hemodynamic monitoring, assess perfusion indices   SR / CVP 3/ MAP 82/Hct 27.6/ Lactate 1.2  [x] Cardene 5mG/Hr, weaned off  Rate control with Mexiletine Digoxin, and Lopressor   Serial EKG and cardiac enzymes       ***Pulmonary***  Trach today with ENT  plan for possible bronch after Trach  Post op vent management   Titration of FiO2 and PEEP, follow SpO2, CXR, blood gasses   Continue bronchodilators duoneb, pulmacort  PS trials while awaiting trach   Mode: CPAP with PS  FiO2: 40  PEEP: 5  PS: 10  MAP: 9  PIP: 16              ***GI***  [x] NPO for procedure-> Diet:  TFs, Glucerna 1.5 @ 55cc/hr   [x] Protonix         ***Renal***  Continue to monitor I/Os, BUN/Creatinine.   Replete lytes PRN  Seven present   Diuresis with Lasix       ***ID***  SCx on 10/4+Methicillin resistant Staphylococcus aureus, c/w IVPB Vancomycin    9/27 blood culture Candida Glabarata now on flucanazole  PO Vancomycin solution for C.diff prophylaxis       ***Endocrine***  [x]  DM : HbA1c 9.4%                - [x] ISS  [x] NPH              - Need tight glycemic control to prevent wound infection.        Patient requires continuous monitoring with bedside rhythm monitoring, pulse oximetry monitoring, and continuous central venous and arterial pressure monitoring; and intermittent blood gas analysis. Care plan discussed with the ICU care team.   Patient remained critical, at risk for life threatening decompensation.    I have spent 45 minutes providing critical care management to this patient.    By signing my name below, IKieran, attest that this documentation has been prepared under the direction and in the presence of Simone Garcia NP   Electronically signed: Georgi Cordero, 10-10-22 @ 10:16    EVERETTE, Simone Garcia NP, personally performed the services described in this documentation. all medical record entries made by the scribe were at my direction and in my presence. I have reviewed the chart and agree that the record reflects my personal performance and is accurate and complete  Electronically signed: Simone Garcia NP  Patient seen and examined at the bedside.    Remained critically ill on continuous ICU monitoring.    OBJECTIVE:  Vital Signs Last 24 Hrs  T(C): 36.3 (10 Oct 2022 08:00), Max: 38.2 (09 Oct 2022 20:00)  T(F): 97.3 (10 Oct 2022 08:00), Max: 100.8 (09 Oct 2022 20:00)  HR: 65 (10 Oct 2022 09:20) (59 - 88)  BP: --  BP(mean): --  RR: 0 (10 Oct 2022 09:00) (0 - 24)  SpO2: 100% (10 Oct 2022 09:20) (97% - 100%)    Parameters below as of 10 Oct 2022 08:00  Patient On (Oxygen Delivery Method): ventilator    O2 Concentration (%): 40      Physical Exam:   General: intubated  Neurology: somnolent, but able to follow simple commands  ENT/Neck: Neck supple, trachea midline, No JVD  Respiratory: rhonchi throughout    CV: S1S2, no murmurs        [x] Sinus Rhythm   Abdominal: Soft, NT, ND, colostomy in place  Extremities: +4 RUE edema, 3+LUE edema, 2 + pedal edema noted, + peripheral pulses   Skin: Dry dressing over right heel, no Rashes, Hematoma, Ecchymosis , R elbow wound w/ eschar                     Assessment:  71F PMH DM, COPD, Chronic Adrenal Insufficiency on Chronic prednisone, history of colorectal cancer s/p resection (colostomy bag), Hx of CAD, Chronic A fib on Eliquis, and tracheomalacia and multiple intubations, recent dx of OM presented to ED at [facility] this morning with epigastric pain, belching and central chest pain. Found on CTA to have type A aortic dissection and transferred to [facility] for surgical evaluation by Dr. Cabrera.     Ascending aortic dissection s/p type A dissection repair and biobentall on 9/7   Hypovolemia   Post op respiratory failure   Acute blood loss anemia  Stress hyperglycemia   Leukocytosis   RIJ thrombus  MRSA PNA        Plan:   ***Neuro***  [x] Sedated with [x] Precedex   Off sedation follows simple commands      ***Cardiovascular***  Limited TTE on 10/1: EF 69%, Hyperdynamic left ventricular systolic function. There is hypokinesis of the mid-base inferior wall. Nml RV fxn.   CT chest on 9/28: No CT evidence of pulmonary embolism.Status post repair of ascending aortic dissection with a stable dissection flap originating from the aortic arch and extending into the infrarenal abdominal aorta,  B/l UE duplex on 10/5: As before, there is an occluded RIGHT IJ vein with thrombosis extending to brachiocephalic vein. Superficial thrombophlebitis of the LEFT cephalic vein.  Invasive hemodynamic monitoring, assess perfusion indices   SR / CVP 3/ MAP 82/Hct 27.6/ Lactate 1.2  [x] Cardene 5mG/Hr, weaned off  Rate control with Mexiletine Digoxin, and Lopressor   Serial EKG and cardiac enzymes       ***Pulmonary***  Trach today with ENT  plan for possible bronch after Trach  Post op vent management   Titration of FiO2 and PEEP, follow SpO2, CXR, blood gasses   Continue bronchodilators duoneb, pulmacort  PS trials while awaiting trach   Mode: CPAP with PS  FiO2: 40  PEEP: 5  PS: 10  MAP: 9  PIP: 16              ***GI***  [x] NPO for procedure-> Diet:  TFs, Glucerna 1.5 @ 55cc/hr   [x] Protonix         ***Renal***  Continue to monitor I/Os, BUN/Creatinine.   Replete lytes PRN  Seven present   Diuresis with Lasix       ***ID***  SCx on 10/4+Methicillin resistant Staphylococcus aureus, c/w IVPB Vancomycin    9/27 blood culture Candida Glabarata now on flucanazole  PO Vancomycin solution for C.diff prophylaxis   R elbow wound w/ eschar-> wound team evaluated, recommending ortho consult for further joint work up      ***Endocrine***  [x]  DM : HbA1c 9.4%                - [x] ISS  [x] NPH              - Need tight glycemic control to prevent wound infection.        Patient requires continuous monitoring with bedside rhythm monitoring, pulse oximetry monitoring, and continuous central venous and arterial pressure monitoring; and intermittent blood gas analysis. Care plan discussed with the ICU care team.   Patient remained critical, at risk for life threatening decompensation.    I have spent 45 minutes providing critical care management to this patient.    By signing my name below, I, Kieran Plascencia, attest that this documentation has been prepared under the direction and in the presence of Simone Garcia NP   Electronically signed: Georgi Cordero, 10-10-22 @ 10:16    I, Simone Garcia NP, personally performed the services described in this documentation. all medical record entries made by the scribe were at my direction and in my presence. I have reviewed the chart and agree that the record reflects my personal performance and is accurate and complete  Electronically signed: Simone Garcia NP

## 2022-10-10 NOTE — PROGRESS NOTE ADULT - ASSESSMENT
Impression:    Sacral/bilateral Buttocks deep tissue injury in evolution  Right elbow wound    Recommend:  1.) topical therapy: sacral/buttock injury - cleanse with incontinence cleanser, pat dry, apply allevyn foam dressing every other day  RIght elbow wound - cleanse gently with NS, pat dry, apply allevyn foam dressing until Ortho consult  2.) Incontinence Management - incontinence cleanser, pads, pericare BID  3.) Maintain on an alternating air with low air loss surface  4.) Turn and reposition Q 2 hours  5.) Nutrition optimization - please add Isidro  6.) Offload heels/feet with complete cair air fluidized boots; ensure that the soles of the feet are not resting on the foot board of the bed.  7.) Consider Ortho consult to evaluate Right elbow fluid over joint.  8.) hyperglycemia - continue insulin SS    Care as per medicine. Will not actively follow but will remain available. Please recall for new issues or deterioration.  Upon [facility] [address] [place] [address] [phone]  Thank you for this consult  Seen with Dr. Hooker and Dr. Yanes, Discussed with primary team ACP.  Olamide Lewis, NP-C, CWOCN [id number] Impression:    Sacral/bilateral Buttocks deep tissue injury in evolution  Right elbow wound    Recommend:  1.) topical therapy: sacral/buttock injury - cleanse with incontinence cleanser, pat dry, apply allevyn foam dressing every other day  RIght elbow wound - cleanse gently with NS, pat dry, apply allevyn foam dressing until Ortho consult  2.) Incontinence Management - incontinence cleanser, pads, pericare BID  3.) Maintain on an alternating air with low air loss surface  4.) Turn and reposition Q 2 hours  5.) Nutrition optimization - please add Isidro  6.) Offload heels/feet with complete cair air fluidized boots; ensure that the soles of the feet are not resting on the foot board of the bed.  7.) Consider Ortho consult to evaluate Right elbow fluid over joint.  8.) hyperglycemia - continue insulin SS    Care as per medicine. Will not actively follow but will remain available. Please recall for new issues or deterioration.  Upon discharge f/u [facility] at [facility] [address] [place] [address] [phone]  Thank you for this consult  Seen with Dr. Hooker, Discussed with primary team ACP.  Olamide Lewis, NP-C, CWOCN [id number]

## 2022-10-10 NOTE — BRIEF OPERATIVE NOTE - NSICDXBRIEFPREOP_GEN_ALL_CORE_FT
PRE-OP DIAGNOSIS:  Respiratory failure 10-Oct-2022 17:51:50  Nadine Guzman  
PRE-OP DIAGNOSIS:  Ascending aortic dissection 07-Sep-2022 00:26:51  Albert Desai  Aortic dissection 07-Sep-2022 00:26:45  Albert Desai

## 2022-10-10 NOTE — PROGRESS NOTE ADULT - ASSESSMENT
70 YO F with PMH of  COPD, Chronic Adrenal Insufficiency on Chronic prednisone, history of colorectal cancer s/p resection (colostomy bag), Hx of CAD, Chronic A fib on Eliquis, and tracheomalacia and multiple intubations found on CTA to have type A aortic dissection and transferred to [facility] for surgical evaluation by Dr. Cabrera. ENT called to evaluate for tracheostomy.  Pt failed weaning trials and is not a candidate for extubation as per primary team. Now S/P #8 Portex Cuffed  Tracheostomy POD # 0.  Doing well on the ventilator. No bleeding noted.

## 2022-10-10 NOTE — PROGRESS NOTE ADULT - SUBJECTIVE AND OBJECTIVE BOX
Wound Surgery Consult Note:    HPI:  71F PMH DM, COPD, Chronic Adrenal Insufficiency on Chronic prednisone, history of colorectal cancer s/p resection (colostomy bag), Hx of CAD, Chronic A fib on Eliquis, and tracheomalacia and multiple intubations, recent dx of OM presented to ED at [facility] this morning with epigastric pain, belching and central chest pain. Found on CTA to have type A aortic dissection and transferred to [facility] for surgical evaluation by Dr. Cabrera. (06 Sep 2022 16:32)    Request for wound care consult for Right elbow wound received from primary team. Ms. Bloom was encountered on an alternating air with low air loss surface. She was seen with Dr. Hooker and her clinical nurse. Ms. Bloom is known to the wound care team from prior visits for buttocks skin injury. The right elbow wound is new. There are known DVTs in the RUE. Will not recommend any form of debridement of eschar until etiology of the fluctuance over the elbow is ascertained. Therefore, suggest Ortho consult to evaluate fluid over elbow.    PAST MEDICAL & SURGICAL HISTORY:  Atrial fibrillation, paroxysmal, on eliquis  Diabetes, Type 2  COPD (chronic obstructive pulmonary disease)  Adrenal insufficiency, Medrol daily for over 50 years  Aortic insufficiency, moderate AR on echo 5/3/2018  Pelvic fracture  Asthma  Tracheobronchomalacia  diagnosed 2015, s/p bronchial thermoplasty 2016 (Dr Zapien); recent bronchoscopy 6/5/2018 revealed no evidence of tracheobronchomalacia in trachea or bronchial tubes  Colorectal cancer, 4/2018- last treatment , chemo and radiation  Rectal bleeding  Seizure, x 1 1/7/18  DVT (deep venous thrombosis), 15-20 years ago, took coumadin  TIA (transient ischemic attack), multiple, last 5 years ago - presents as right-sided weakness  History of partial hysterectomy, 30 years ago - fibroids  H/O total knee replacement, bilateral, 5 years ago  History of sinus surgery, multiple sinus surgeries  Exostosis of orbit, left, 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  H/O pelvic surgery, 5 years ago - s/p fracture  History of tracheomalacia  2015 - attempted tracheal stenting ([facility])- course complicated by obstruction, respiratory failure, multiple CPR attempts -  stent discontinued; 10/20/2016 Tracheobronchoplasty (Prolene Mesh) performed at [facility] by Dr Zapien  S/P bronchoscopy, 6/5/2018 - [facility] (Dr Zapien) no evidence of tracheobronchomalacia in trachea or bronchial tubes  Rectal bleeding, exam under anesthesia ([facility]) 2/2018      REVIEW OF SYSTEMS  Pt unable to offer due to intubation  Skin/ MSK: see HPI  All other systems negative    MEDICATIONS  (STANDING):  albuterol/ipratropium for Nebulization 3 milliLiter(s) Nebulizer every 6 hours  buDESOnide    Inhalation Suspension 0.5 milliGRAM(s) Inhalation every 12 hours  chlorhexidine 0.12% Liquid 15 milliLiter(s) Oral Mucosa every 12 hours  chlorhexidine 2% Cloths 1 Application(s) Topical <User Schedule>  collagenase Ointment 1 Application(s) Topical daily  dexMEDEtomidine Infusion 0.5 MICROgram(s)/kG/Hr (8.38 mL/Hr) IV Continuous <Continuous>  dextrose 5% + sodium chloride 0.45%. 1000 milliLiter(s) (25 mL/Hr) IV Continuous <Continuous>  dextrose 50% Injectable 50 milliLiter(s) IV Push every 15 minutes  digoxin  Injectable 125 MICROGram(s) IV Push daily  fluconAZOLE IVPB 600 milliGRAM(s) IV Intermittent every 24 hours  furosemide    Tablet 20 milliGRAM(s) Oral two times a day  insulin lispro (ADMELOG) corrective regimen sliding scale   SubCutaneous every 6 hours  insulin NPH human recombinant 19 Unit(s) SubCutaneous every 6 hours  magnesium oxide 400 milliGRAM(s) Oral every 8 hours  methylPREDNISolone 8 milliGRAM(s) Oral daily  metoprolol tartrate 25 milliGRAM(s) Oral two times a day  mexiletine 200 milliGRAM(s) Oral every 8 hours  multivitamin 1 Tablet(s) Oral daily  niCARdipine Infusion 5 mG/Hr (25 mL/Hr) IV Continuous <Continuous>  pantoprazole  Injectable 40 milliGRAM(s) IV Push daily  sodium chloride 0.9%. 1000 milliLiter(s) (10 mL/Hr) IV Continuous <Continuous>  vancomycin    Solution 125 milliGRAM(s) Oral every 12 hours  vancomycin  IVPB      vancomycin  IVPB 1000 milliGRAM(s) IV Intermittent every 12 hours    MEDICATIONS  (PRN):  simethicone drops 80 milliGRAM(s) Oral every 12 hours PRN Gas      Allergies    aspirin (Short breath)  Avelox (Short breath; Pruritus)  cefepime (Anaphylaxis)  codeine (Short breath)  Dilaudid (Short breath)  iodine (Short breath; Swelling)  penicillin (Anaphylaxis)  shellfish (Anaphylaxis)  tetanus toxoid (Short breath)  Valium (Short breath)    Intolerances    SOCIAL HISTORY:      FAMILY HISTORY:  Family history of asthma    Family history of breast cancer (Sibling)    Family history of diabetes mellitus type II    Vital Signs Last 24 Hrs  T(C): 36.9 (10 Oct 2022 12:00), Max: 38.2 (09 Oct 2022 20:00)  T(F): 98.4 (10 Oct 2022 12:00), Max: 100.8 (09 Oct 2022 20:00)  HR: 64 (10 Oct 2022 14:00) (59 - 88)  BP: --  BP(mean): --  RR: 20 (10 Oct 2022 14:00) (10 - 24)  SpO2: 99% (10 Oct 2022 14:00) (97% - 100%)    Parameters below as of 10 Oct 2022 12:00  Patient On (Oxygen Delivery Method): ventilator    O2 Concentration (%): 40    Physical Exam:  General: Somnolent, obese  Ophthamology: clear sclera  ENMT: trachea midline, mucous membranes moist  Respiratory: vented  Gastrointestinal: soft NT/ND  Neurology: nonverbal, not following commands  Psych: calm, appropriate  Musculoskeletal: no contractures, no deformities  Vascular: BLE edema equal, RUE edema>LUE edema  Skin:  Sacral/bilateral buttocks with superficially denuded skin in and around the gluteal cleft L 2cm X W 3cm x D 0.2cm with pink wound bed, no necrotic tissue, with a ring of darkened skin around the wound and scant serosanguinous drainage, no fluctuance, no erythema  Right elbow wound L 7cm x W 5cm x D unknown,100% covered with firm, fixed, black, soft, eschar, erythema surround wound extending up arm  but stopping before the axilla, + fluctuance   No odor, increased warmth, tenderness, induration    LABS:  10-10    138  |  100  |  11  ----------------------------<  101<H>  4.0   |  28  |  0.41<L>    Ca    8.9      10 Oct 2022 00:57  Phos  2.5     10-10  Mg     1.8     10-10    TPro  6.6  /  Alb  3.0<L>  /  TBili  0.4  /  DBili  x   /  AST  44<H>  /  ALT  67<H>  /  AlkPhos  172<H>  10-10                          8.1    16.85 )-----------( 274      ( 10 Oct 2022 00:57 )             27.6     PT/INR - ( 09 Oct 2022 00:25 )   PT: 13.0 sec;   INR: 1.13 ratio         PTT - ( 10 Oct 2022 00:57 )  PTT:40.4 sec      RADIOLOGY & ADDITIONAL STUDIES:    EXAM:  DUPLEX SCAN EXT VEINS UPPER BI                          PROCEDURE DATE:  10/05/2022          INTERPRETATION:  CLINICAL INFORMATION: 72-year-old female, edema, exclude   DVT.    COMPARISON: Bilateral upper extremities venous duplex 9/30/2022 and   9/12/2022.    TECHNIQUE: Duplex sonography of the BILATERAL upper extremity veins with   color and spectral Doppler, with and without compression.    FINDINGS:    RIGHT:    As before, occluded right internal jugular vein with thrombus extending   to the brachiocephalic vein.    The right subclavian, axillary and brachial veins are patent and   compressible where applicable.    The right basilic and cephalic veins (superficial veins) are patent and   without thrombus.    LEFT:    Limited evaluation of the internal jugular vein and brachiocephalic vein   secondary to vascular catheters and overlying dressings.    Internal echogenicity and lack of compression of the cephalic vein, a   superficial vein, to the level of the wrist.    The left subclavian, axillary and brachial veins are patent and   compressible where applicable.  The basilic vein (superficial vein) is   patent and without thrombus.    Doppler examination shows normal spontaneous and phasic flow.    IMPRESSION:    As before, there is an occluded RIGHT IJ vein with thrombosis extending   to brachiocephalic vein.    Superficial thrombophlebitis of the LEFT cephalic vein.    EXAM:  DUPLEX EXT VEINS UPPER RT                          PROCEDURE DATE:  09/12/2022      INTERPRETATION:  CLINICAL INFORMATION: Cardiothoracic surgery, right   upper extremity swelling    COMPARISON: None available.    TECHNIQUE: Duplex sonography of the RIGHT UPPER extremity veins with   color and spectral Doppler, with and without compression.    FINDINGS:    The right internal jugular vein is thrombosed, occluded.    There is an early bifurcation of the right brachial artery into radial   and ulnar branches in the upper arm each accompanied by their own veins.   This is a common anatomic variant.    The right subclavian, axillary and brachial veins, and the radial and   ulnar veins in the upper arm are patent and free of clot.    The right basilic and cephalic vein, superficial veins, are patent and   free of clot.    IMPRESSION: There is a thrombosed and occluded right internal jugular   vein.         Wound Surgery Consult Note:    HPI:  71F PMH DM, COPD, Chronic Adrenal Insufficiency on Chronic prednisone, history of colorectal cancer s/p resection (colostomy bag), Hx of CAD, Chronic A fib on Eliquis, and tracheomalacia and multiple intubations, recent dx of OM presented to ED at [facility] this morning with epigastric pain, belching and central chest pain. Found on CTA to have type A aortic dissection and transferred to [facility] for surgical evaluation by Dr. Cabrera. (06 Sep 2022 16:32)    Request for wound care consult for Right elbow wound received from primary team. Ms. Bloom was encountered on an alternating air with low air loss surface. She was seen with Dr. Hooker and her clinical nurse. Ms. Bloom is known to the wound care team from prior visits for buttocks skin injury. The right elbow wound is new. There are known DVTs in the RIJ and brachiocephalic vein. Will not recommend any form of debridement of eschar until etiology of the fluctuance over the elbow is ascertained. Therefore, suggest Ortho consult to evaluate fluid over elbow.    PAST MEDICAL & SURGICAL HISTORY:  Atrial fibrillation, paroxysmal, on eliquis  Diabetes, Type 2  COPD (chronic obstructive pulmonary disease)  Adrenal insufficiency, Medrol daily for over 50 years  Aortic insufficiency, moderate AR on echo 5/3/2018  Pelvic fracture  Asthma  Tracheobronchomalacia  diagnosed 2015, s/p bronchial thermoplasty 2016 (Dr Zapien); recent bronchoscopy 6/5/2018 revealed no evidence of tracheobronchomalacia in trachea or bronchial tubes  Colorectal cancer, 4/2018- last treatment , chemo and radiation  Rectal bleeding  Seizure, x 1 1/7/18  DVT (deep venous thrombosis), 15-20 years ago, took coumadin  TIA (transient ischemic attack), multiple, last 5 years ago - presents as right-sided weakness  History of partial hysterectomy, 30 years ago - fibroids  H/O total knee replacement, bilateral, 5 years ago  History of sinus surgery, multiple sinus surgeries  Exostosis of orbit, left, 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  H/O pelvic surgery, 5 years ago - s/p fracture  History of tracheomalacia  2015 - attempted tracheal stenting ([facility])- course complicated by obstruction, respiratory failure, multiple CPR attempts -  stent discontinued; 10/20/2016 Tracheobronchoplasty (Prolene Mesh) performed at [facility] by Dr Zapien  S/P bronchoscopy, 6/5/2018 - [facility] (Dr Zapien) no evidence of tracheobronchomalacia in trachea or bronchial tubes  Rectal bleeding, exam under anesthesia ([facility]) 2/2018      REVIEW OF SYSTEMS  Pt unable to offer due to intubation  Skin/ MSK: see HPI  All other systems negative    MEDICATIONS  (STANDING):  albuterol/ipratropium for Nebulization 3 milliLiter(s) Nebulizer every 6 hours  buDESOnide    Inhalation Suspension 0.5 milliGRAM(s) Inhalation every 12 hours  chlorhexidine 0.12% Liquid 15 milliLiter(s) Oral Mucosa every 12 hours  chlorhexidine 2% Cloths 1 Application(s) Topical <User Schedule>  collagenase Ointment 1 Application(s) Topical daily  dexMEDEtomidine Infusion 0.5 MICROgram(s)/kG/Hr (8.38 mL/Hr) IV Continuous <Continuous>  dextrose 5% + sodium chloride 0.45%. 1000 milliLiter(s) (25 mL/Hr) IV Continuous <Continuous>  dextrose 50% Injectable 50 milliLiter(s) IV Push every 15 minutes  digoxin  Injectable 125 MICROGram(s) IV Push daily  fluconAZOLE IVPB 600 milliGRAM(s) IV Intermittent every 24 hours  furosemide    Tablet 20 milliGRAM(s) Oral two times a day  insulin lispro (ADMELOG) corrective regimen sliding scale   SubCutaneous every 6 hours  insulin NPH human recombinant 19 Unit(s) SubCutaneous every 6 hours  magnesium oxide 400 milliGRAM(s) Oral every 8 hours  methylPREDNISolone 8 milliGRAM(s) Oral daily  metoprolol tartrate 25 milliGRAM(s) Oral two times a day  mexiletine 200 milliGRAM(s) Oral every 8 hours  multivitamin 1 Tablet(s) Oral daily  niCARdipine Infusion 5 mG/Hr (25 mL/Hr) IV Continuous <Continuous>  pantoprazole  Injectable 40 milliGRAM(s) IV Push daily  sodium chloride 0.9%. 1000 milliLiter(s) (10 mL/Hr) IV Continuous <Continuous>  vancomycin    Solution 125 milliGRAM(s) Oral every 12 hours  vancomycin  IVPB      vancomycin  IVPB 1000 milliGRAM(s) IV Intermittent every 12 hours    MEDICATIONS  (PRN):  simethicone drops 80 milliGRAM(s) Oral every 12 hours PRN Gas      Allergies    aspirin (Short breath)  Avelox (Short breath; Pruritus)  cefepime (Anaphylaxis)  codeine (Short breath)  Dilaudid (Short breath)  iodine (Short breath; Swelling)  penicillin (Anaphylaxis)  shellfish (Anaphylaxis)  tetanus toxoid (Short breath)  Valium (Short breath)    Intolerances    SOCIAL HISTORY:      FAMILY HISTORY:  Family history of asthma    Family history of breast cancer (Sibling)    Family history of diabetes mellitus type II    Vital Signs Last 24 Hrs  T(C): 36.9 (10 Oct 2022 12:00), Max: 38.2 (09 Oct 2022 20:00)  T(F): 98.4 (10 Oct 2022 12:00), Max: 100.8 (09 Oct 2022 20:00)  HR: 64 (10 Oct 2022 14:00) (59 - 88)  BP: --  BP(mean): --  RR: 20 (10 Oct 2022 14:00) (10 - 24)  SpO2: 99% (10 Oct 2022 14:00) (97% - 100%)    Parameters below as of 10 Oct 2022 12:00  Patient On (Oxygen Delivery Method): ventilator    O2 Concentration (%): 40    Physical Exam:  General: Somnolent, obese  Ophthamology: clear sclera  ENMT: trachea midline, mucous membranes moist  Respiratory: vented  Gastrointestinal: soft NT/ND  Neurology: nonverbal, not following commands  Psych: calm, appropriate  Musculoskeletal: no contractures, no deformities  Vascular: BLE edema equal, RUE edema>LUE edema  Skin:  Sacral/bilateral buttocks with superficially denuded skin in and around the gluteal cleft L 2cm X W 3cm x D 0.2cm with pink wound bed, no necrotic tissue, with a ring of darkened skin around the wound and scant serosanguinous drainage, no fluctuance, no erythema  Right elbow wound L 7cm x W 5cm x D unknown,100% covered with firm, fixed, black, soft, eschar, erythema surround wound extending up arm  but stopping before the axilla, + fluctuance   No odor, increased warmth, tenderness, induration    LABS:  10-10    138  |  100  |  11  ----------------------------<  101<H>  4.0   |  28  |  0.41<L>    Ca    8.9      10 Oct 2022 00:57  Phos  2.5     10-10  Mg     1.8     10-10    TPro  6.6  /  Alb  3.0<L>  /  TBili  0.4  /  DBili  x   /  AST  44<H>  /  ALT  67<H>  /  AlkPhos  172<H>  10-10                          8.1    16.85 )-----------( 274      ( 10 Oct 2022 00:57 )             27.6     PT/INR - ( 09 Oct 2022 00:25 )   PT: 13.0 sec;   INR: 1.13 ratio         PTT - ( 10 Oct 2022 00:57 )  PTT:40.4 sec      RADIOLOGY & ADDITIONAL STUDIES:    EXAM:  DUPLEX SCAN EXT VEINS UPPER BI                          PROCEDURE DATE:  10/05/2022          INTERPRETATION:  CLINICAL INFORMATION: 72-year-old female, edema, exclude   DVT.    COMPARISON: Bilateral upper extremities venous duplex 9/30/2022 and   9/12/2022.    TECHNIQUE: Duplex sonography of the BILATERAL upper extremity veins with   color and spectral Doppler, with and without compression.    FINDINGS:    RIGHT:    As before, occluded right internal jugular vein with thrombus extending   to the brachiocephalic vein.    The right subclavian, axillary and brachial veins are patent and   compressible where applicable.    The right basilic and cephalic veins (superficial veins) are patent and   without thrombus.    LEFT:    Limited evaluation of the internal jugular vein and brachiocephalic vein   secondary to vascular catheters and overlying dressings.    Internal echogenicity and lack of compression of the cephalic vein, a   superficial vein, to the level of the wrist.    The left subclavian, axillary and brachial veins are patent and   compressible where applicable.  The basilic vein (superficial vein) is   patent and without thrombus.    Doppler examination shows normal spontaneous and phasic flow.    IMPRESSION:    As before, there is an occluded RIGHT IJ vein with thrombosis extending   to brachiocephalic vein.    Superficial thrombophlebitis of the LEFT cephalic vein.    EXAM:  DUPLEX EXT VEINS UPPER RT                          PROCEDURE DATE:  09/12/2022      INTERPRETATION:  CLINICAL INFORMATION: Cardiothoracic surgery, right   upper extremity swelling    COMPARISON: None available.    TECHNIQUE: Duplex sonography of the RIGHT UPPER extremity veins with   color and spectral Doppler, with and without compression.    FINDINGS:    The right internal jugular vein is thrombosed, occluded.    There is an early bifurcation of the right brachial artery into radial   and ulnar branches in the upper arm each accompanied by their own veins.   This is a common anatomic variant.    The right subclavian, axillary and brachial veins, and the radial and   ulnar veins in the upper arm are patent and free of clot.    The right basilic and cephalic vein, superficial veins, are patent and   free of clot.    IMPRESSION: There is a thrombosed and occluded right internal jugular   vein.

## 2022-10-10 NOTE — PROGRESS NOTE ADULT - ASSESSMENT
71 year-old female with a history of DM2, CAD, A-Fib on apixaban, Severe Persistent Asthma (on chronic steroids, recently started on Tezspire), colon cancer s/p resection/chemo, and tracheobronchomalacia s/p tracheoplasty, and recent OM of the R foot s/b debridement and completed course of Vancomycin/Ertapenem for MRSA/ESBL Proteus/Corynebacterium who now presents with chest pain, found to have Type A dissection s/p repair with modified Bentall procedure and hemiarch replacement on 22. Post-op course complicated by acute hypoxemic respiratory failure, shock, anemia, and hyperglycemia requiring insulin gtt. Extubated , now awake and alert with mild encephalopathy but overall significantly improved.    Assessment:  Acute hypoxemic respiratory failure requiring intubation  Type A Aortic Dissection  Severe Persistent Asthma  History of Tracheobronchomalacia    Plan:  See below:  -**************************                                SEE Below:  -improving but weakness  9/15-ABX adjusted to ceftriaxone (LFTS)-PICU  -PICU, low grade temp-fever work up in progress, no resp decline or sx  -resp stable at present but tenuous  -for FEESST today, NGT in place w/TF  -s/p FEESST-passed, remains in PICU          -TF in place at 40cc, more OOB          -hypoglycemia noted and rx, no change in resp status  -vented/sedated-pressors/inotropes/ABX  -remains vented on nitrous/less O2 needs, improving LFTS                   -vented/semi sedated--less O2 needs  10/3-on vanco, weaning sedation/, all lines changed  10/6-no changes-now afebrile-on vanco   10/7-no weaning-remains off pressors  10/10-for trach, no weaning done

## 2022-10-10 NOTE — PROGRESS NOTE ADULT - PROBLEM SELECTOR PLAN 1
- Keep the Trach site clean and dry.   - Remove Sutures on POD #7.   - Keep the Omniflex for a week to avoid the manipulation of the stoma.   - Elevate HOB.   - Gentle suction prn.   - Vent per RT.   - ENT will follow.   - Call with questions.     # [phone]  ENT PA

## 2022-10-10 NOTE — BRIEF OPERATIVE NOTE - NSICDXBRIEFPROCEDURE_GEN_ALL_CORE_FT
PROCEDURES:  Repair, aortic dissection, type A 07-Sep-2022 00:26:19  Albert Desai  
PROCEDURES:  Open tracheostomy 10-Oct-2022 17:51:23  Nadine Guzman

## 2022-10-10 NOTE — PRE-ANESTHESIA EVALUATION ADULT - NSANTHPROCED_GEN_ALL_CORE
Arterial Catheter/Central Venous Catheter/Pulmonary Artery Catheter
Arterial Catheter/Central Venous Catheter

## 2022-10-10 NOTE — PROGRESS NOTE ADULT - SUBJECTIVE AND OBJECTIVE BOX
Patient seen and examined at the bedside.    Remained critically ill on continuous ICU monitoring.    OBJECTIVE:  Vital Signs Last 24 Hrs  T(C): 36.6 (10 Oct 2022 17:00), Max: 38.2 (09 Oct 2022 20:00)  T(F): 97.9 (10 Oct 2022 17:00), Max: 100.8 (09 Oct 2022 20:00)  HR: 63 (10 Oct 2022 18:10) (59 - 79)  BP: --  BP(mean): --  RR: 17 (10 Oct 2022 18:00) (10 - 24)  SpO2: 100% (10 Oct 2022 18:10) (96% - 100%)    Parameters below as of 10 Oct 2022 18:10  Patient On (Oxygen Delivery Method): ventilator    Physical Exam:   General: intubated  Neurology: somnolent, but able to follow simple commands  ENT/Neck: Neck supple, trachea midline, No JVD  Respiratory: rhonchi throughout    CV: S1S2, no murmurs        [x] Sinus Rhythm   Abdominal: Soft, NT, ND, colostomy in place  Extremities: +4 RUE edema, 3+LUE edema, 2 + pedal edema noted, + peripheral pulses   Skin: Dry dressing over right heel, no Rashes, Hematoma, Ecchymosis , R elbow wound w/ eschar          Assessment:  71F PMH DM, COPD, Chronic Adrenal Insufficiency on Chronic prednisone, history of colorectal cancer s/p resection (colostomy bag), Hx of CAD, Chronic A fib on Eliquis, and tracheomalacia and multiple intubations, recent dx of OM presented to ED at [facility] this morning with epigastric pain, belching and central chest pain. Found on CTA to have type A aortic dissection and transferred to [facility] for surgical evaluation by Dr. Cabrera.     Ascending aortic dissection s/p type A dissection repair and biobentall on 9/7   Respiratory failure s/p Trach 10/10/22  Hypovolemia   Post op respiratory failure   Acute blood loss anemia  Stress hyperglycemia   Leukocytosis   RIJ thrombus  MRSA PNA    Plan:   ***Neuro***  [x] Sedated with [x] Precedex   Off sedation follows simple commands    ***Cardiovascular***  Limited TTE on 10/1: EF 69%, Hyperdynamic left ventricular systolic function. There is hypokinesis of the mid-base inferior wall. Nml RV fxn.   CT chest on 9/28: No CT evidence of pulmonary embolism. Status post repair of ascending aortic dissection with a stable dissection flap originating from the aortic arch and extending into the infrarenal abdominal aorta,  B/l UE duplex on 10/5: As before, there is an occluded RIGHT IJ vein with thrombosis extending to brachiocephalic vein. Superficial thrombophlebitis of the LEFT cephalic vein.    Invasive hemodynamic monitoring, assess perfusion indices   SR / CVP 5/ MAP 68/Hct 23.1%/ Lactate 0.9  [x] Vasopressin- 0.1units/min   Rate control with Mexiletine Digoxin, and Lopressor   Serial EKG and cardiac enzymes     ***Pulmonary***  Respiratory failure s/p Trach #8 portex  10/10/22  Post op vent management   Titration of FiO2 and PEEP, follow SpO2, CXR, blood gasses   Continue bronchodilators duoneb, pulmacort    Mode: AC/ CMV (Assist Control/ Continuous Mandatory Ventilation)  RR (machine): 16  TV (machine): 450  FiO2: 40  PEEP: 5  ITime: 1  MAP: 9  PIP: 20              ***GI***  [x] Diet:  TFs, Glucerna 1.5 @ 55cc/hr   [x] Protonix       ***Renal***  Continue to monitor I/Os, BUN/Creatinine.   Replete lytes PRN  Seven present   Diuresis with Lasix     ***ID***  SCx on 10/4+Methicillin resistant Staphylococcus aureus, c/w IVPB Vancomycin    9/27 blood culture Candida Glabarata now on flucanazole  PO Vancomycin solution for C.diff prophylaxis   R elbow wound w/ eschar-> wound team evaluated, recommending ortho consult for further joint work up    ***Endocrine***  [x]  DM : HbA1c 9.4%                - [x] ISS  [x] NPH              - Need tight glycemic control to prevent wound infection.      Patient requires continuous monitoring with bedside rhythm monitoring, pulse oximetry monitoring, and continuous central venous and arterial pressure monitoring; and intermittent blood gas analysis. Care plan discussed with the ICU care team.   Patient remained critical, at risk for life threatening decompensation.    I have spent 30 minutes providing critical care management to this patient.    By signing my name below, I, Bharti Barrios, attest that this documentation has been prepared under the direction and in the presence of Gena Meyer NP.  Electronically signed: Georgi Gottlieb, 10-10-22 @ 19:29    I, Gena Meyer, personally performed the services described in this documentation. all medical record entries made by the marcyibe were at my direction and in my presence. I have reviewed the chart and agree that the record reflects my personal performance and is accurate and complete  Electronically signed: Gena Meyer NP. Patient seen and examined at the bedside.    Remained critically ill on continuous ICU monitoring.    OBJECTIVE:  Vital Signs Last 24 Hrs  T(C): 36.6 (10 Oct 2022 17:00), Max: 38.2 (09 Oct 2022 20:00)  T(F): 97.9 (10 Oct 2022 17:00), Max: 100.8 (09 Oct 2022 20:00)  HR: 63 (10 Oct 2022 18:10) (59 - 79)  BP: --  BP(mean): --  RR: 17 (10 Oct 2022 18:00) (10 - 24)  SpO2: 100% (10 Oct 2022 18:10) (96% - 100%)    Parameters below as of 10 Oct 2022 18:10  Patient On (Oxygen Delivery Method): ventilator    Physical Exam:   General: intubated  Neurology: somnolent, but able to follow simple commands  ENT/Neck: Neck supple, trachea midline, No JVD  Respiratory: rhonchi throughout    CV: S1S2, no murmurs        [x] Sinus Rhythm   Abdominal: Soft, NT, ND, colostomy in place  Extremities: +4 RUE edema, 3+LUE edema, 2 + pedal edema noted, + peripheral pulses   Skin: Dry dressing over right heel, no Rashes, Hematoma, Ecchymosis , R elbow wound w/ eschar          Assessment:  71F PMH DM, COPD, Chronic Adrenal Insufficiency on Chronic prednisone, history of colorectal cancer s/p resection (colostomy bag), Hx of CAD, Chronic A fib on Eliquis, and tracheomalacia and multiple intubations, recent dx of OM presented to ED at [facility] this morning with epigastric pain, belching and central chest pain. Found on CTA to have type A aortic dissection and transferred to [facility] for surgical evaluation by Dr. Cabrera.     Ascending aortic dissection s/p type A dissection repair and biobentall on 9/7   Respiratory failure s/p Trach 10/10/22  Hypovolemia   Post op respiratory failure   Acute blood loss anemia  Stress hyperglycemia   Leukocytosis   RIJ thrombus  MRSA PNA    Plan:   ***Neuro***  Off sedation, follows simple commands, continue to monitor  PT/OT progress as tolerated    ***Cardiovascular***  Limited TTE on 10/1: EF 69%, Hyperdynamic left ventricular systolic function. There is hypokinesis of the mid-base inferior wall. Nml RV fxn.   CT chest on 9/28: No CT evidence of pulmonary embolism. Status post repair of ascending aortic dissection with a stable dissection flap originating from the aortic arch and extending into the infrarenal abdominal aorta,  B/l UE duplex on 10/5: As before, there is an occluded RIGHT IJ vein with thrombosis extending to brachiocephalic vein. Superficial thrombophlebitis of the LEFT cephalic vein. heparin drip held for OR today, will f/u H/H post PRBC and if stable with no signs of bleeding will resume heparin drip tonight    Invasive hemodynamic monitoring, assess perfusion indices   SR / CVP 5/ MAP 68/Hct 23.1%/ Lactate 0.9  Rate control with Mexiletine Digoxin, and Lopressor   Serial EKG and cardiac enzymes     ***Pulmonary***  Respiratory failure s/p Trach #8 portex  10/10/22, per ENT inner cannula needs to be changed daily  Post op vent management   Titration of FiO2 and PEEP, follow SpO2, CXR, blood gasses   Continue bronchodilators duoneb, pulmacort  plan for CPAP in the morning, TC trials to be started when appropriate as well    Mode: AC/ CMV (Assist Control/ Continuous Mandatory Ventilation)  RR (machine): 16  TV (machine): 450  FiO2: 40  PEEP: 5  ITime: 1  MAP: 9  PIP: 20              ***GI***  [x] Diet:  TFs, Glucerna 1.5 @ 55cc/hr   [x] Protonix       ***Renal***  Continue to monitor I/Os, BUN/Creatinine.   Replete lytes PRN  Seven present   Diuresis with Lasix     ***ID***  SCx on 10/4+Methicillin resistant Staphylococcus aureus, c/w IVPB Vancomycin, next trough 4am 10/11 (plan for 6 week course in setting of valve surgery, ?11/8 end date)  9/27 blood culture Candida Glabarata now on flucanazole (lifelong suppression)  PO Vancomycin solution for C.diff prophylaxis   R elbow wound w/ eschar-> wound team evaluated, recommending ortho consult for further joint work up- waiting xrays    ***Endocrine***  [x]  DM : HbA1c 9.4%                - [x] ISS  [x] NPH              - Need tight glycemic control to prevent wound infection.      Patient requires continuous monitoring with bedside rhythm monitoring, pulse oximetry monitoring, and continuous central venous and arterial pressure monitoring; and intermittent blood gas analysis. Care plan discussed with the ICU care team.   Patient remained critical, at risk for life threatening decompensation.    I have spent 30 minutes providing critical care management to this patient.    By signing my name below, I, Bharti Barrios, attest that this documentation has been prepared under the direction and in the presence of Gena Meyer NP.  Electronically signed: Georgi Gottlieb, 10-10-22 @ 19:29    I, Gena Meyer, personally performed the services described in this documentation. all medical record entries made by the marcyibe were at my direction and in my presence. I have reviewed the chart and agree that the record reflects my personal performance and is accurate and complete  Electronically signed: Gena Meyer NP.

## 2022-10-10 NOTE — PRE-ANESTHESIA EVALUATION ADULT - NSANTHADDINFOFT_GEN_ALL_CORE
All r/b/a discussed with patient's daughter over telephone, all questions answered
Being on Eliquis tremendously raises possibility of post op bleeding despite availability of PCC and factors.

## 2022-10-10 NOTE — PRE-ANESTHESIA EVALUATION ADULT - NSANTHOSAYNRD_GEN_A_CORE
No. BARBARA screening performed.  STOP BANG Legend: 0-2 = LOW Risk; 3-4 = INTERMEDIATE Risk; 5-8 = HIGH Risk
No. BARBARA screening performed.  STOP BANG Legend: 0-2 = LOW Risk; 3-4 = INTERMEDIATE Risk; 5-8 = HIGH Risk

## 2022-10-10 NOTE — BRIEF OPERATIVE NOTE - NSICDXBRIEFPOSTOP_GEN_ALL_CORE_FT
POST-OP DIAGNOSIS:  Respiratory failure 10-Oct-2022 17:52:01  Nadine Guzman  
POST-OP DIAGNOSIS:  Ascending aortic dissection 07-Sep-2022 00:26:49  Albert Desai

## 2022-10-10 NOTE — BRIEF OPERATIVE NOTE - COMMENTS
*** ESTIMATED BLOOD LOSS NOT APPLICABLE DUE TO USE OF CARDIOTOMY AND CELL SAVER SUCTION ***
#8 Portex cuffed tracheostomy tube in place, secured with sutures x4 and umbilical tie.

## 2022-10-11 DIAGNOSIS — Z93.0 TRACHEOSTOMY STATUS: ICD-10-CM

## 2022-10-11 LAB
ALBUMIN SERPL ELPH-MCNC: 3.2 G/DL — LOW (ref 3.3–5)
ALP SERPL-CCNC: 154 U/L — HIGH (ref 40–120)
ALT FLD-CCNC: 64 U/L — HIGH (ref 10–45)
ANION GAP SERPL CALC-SCNC: 11 MMOL/L — SIGNIFICANT CHANGE UP (ref 5–17)
APTT BLD: 29.8 SEC — SIGNIFICANT CHANGE UP (ref 27.5–35.5)
APTT BLD: 41.2 SEC — HIGH (ref 27.5–35.5)
APTT BLD: 41.9 SEC — HIGH (ref 27.5–35.5)
APTT BLD: 55.6 SEC — HIGH (ref 27.5–35.5)
AST SERPL-CCNC: 44 U/L — HIGH (ref 10–40)
BILIRUB SERPL-MCNC: 1 MG/DL — SIGNIFICANT CHANGE UP (ref 0.2–1.2)
BUN SERPL-MCNC: 10 MG/DL — SIGNIFICANT CHANGE UP (ref 7–23)
CALCIUM SERPL-MCNC: 8.8 MG/DL — SIGNIFICANT CHANGE UP (ref 8.4–10.5)
CHLORIDE SERPL-SCNC: 101 MMOL/L — SIGNIFICANT CHANGE UP (ref 96–108)
CO2 SERPL-SCNC: 26 MMOL/L — SIGNIFICANT CHANGE UP (ref 22–31)
CREAT SERPL-MCNC: 0.43 MG/DL — LOW (ref 0.5–1.3)
EGFR: 102 ML/MIN/1.73M2 — SIGNIFICANT CHANGE UP
GAS PNL BLDA: SIGNIFICANT CHANGE UP
GLUCOSE BLDC GLUCOMTR-MCNC: 208 MG/DL — HIGH (ref 70–99)
GLUCOSE BLDC GLUCOMTR-MCNC: 246 MG/DL — HIGH (ref 70–99)
GLUCOSE BLDC GLUCOMTR-MCNC: 272 MG/DL — HIGH (ref 70–99)
GLUCOSE SERPL-MCNC: 208 MG/DL — HIGH (ref 70–99)
HCT VFR BLD CALC: 27.1 % — LOW (ref 34.5–45)
HGB BLD-MCNC: 8.3 G/DL — LOW (ref 11.5–15.5)
INR BLD: 1.12 RATIO — SIGNIFICANT CHANGE UP (ref 0.88–1.16)
MAGNESIUM SERPL-MCNC: 2.3 MG/DL — SIGNIFICANT CHANGE UP (ref 1.6–2.6)
MCHC RBC-ENTMCNC: 24.1 PG — LOW (ref 27–34)
MCHC RBC-ENTMCNC: 30.6 GM/DL — LOW (ref 32–36)
MCV RBC AUTO: 78.8 FL — LOW (ref 80–100)
NRBC # BLD: 0 /100 WBCS — SIGNIFICANT CHANGE UP (ref 0–0)
PHOSPHATE SERPL-MCNC: 2.5 MG/DL — SIGNIFICANT CHANGE UP (ref 2.5–4.5)
PLATELET # BLD AUTO: 279 K/UL — SIGNIFICANT CHANGE UP (ref 150–400)
POTASSIUM SERPL-MCNC: 4.4 MMOL/L — SIGNIFICANT CHANGE UP (ref 3.5–5.3)
POTASSIUM SERPL-SCNC: 4.4 MMOL/L — SIGNIFICANT CHANGE UP (ref 3.5–5.3)
PROT SERPL-MCNC: 6.6 G/DL — SIGNIFICANT CHANGE UP (ref 6–8.3)
PROTHROM AB SERPL-ACNC: 12.9 SEC — SIGNIFICANT CHANGE UP (ref 10.5–13.4)
RBC # BLD: 3.44 M/UL — LOW (ref 3.8–5.2)
RBC # FLD: 25.9 % — HIGH (ref 10.3–14.5)
SODIUM SERPL-SCNC: 138 MMOL/L — SIGNIFICANT CHANGE UP (ref 135–145)
VANCOMYCIN TROUGH SERPL-MCNC: 12.1 UG/ML — SIGNIFICANT CHANGE UP (ref 10–20)
WBC # BLD: 16.47 K/UL — HIGH (ref 3.8–10.5)
WBC # FLD AUTO: 16.47 K/UL — HIGH (ref 3.8–10.5)

## 2022-10-11 PROCEDURE — 73070 X-RAY EXAM OF ELBOW: CPT | Mod: 26,RT

## 2022-10-11 PROCEDURE — 99291 CRITICAL CARE FIRST HOUR: CPT | Mod: 24

## 2022-10-11 PROCEDURE — 99232 SBSQ HOSP IP/OBS MODERATE 35: CPT

## 2022-10-11 PROCEDURE — 71045 X-RAY EXAM CHEST 1 VIEW: CPT | Mod: 26

## 2022-10-11 PROCEDURE — 99024 POSTOP FOLLOW-UP VISIT: CPT

## 2022-10-11 RX ORDER — NICARDIPINE HYDROCHLORIDE 30 MG/1
3 CAPSULE, EXTENDED RELEASE ORAL
Qty: 40 | Refills: 0 | Status: DISCONTINUED | OUTPATIENT
Start: 2022-10-11 | End: 2022-10-13

## 2022-10-11 RX ORDER — LABETALOL HCL 100 MG
10 TABLET ORAL ONCE
Refills: 0 | Status: DISCONTINUED | OUTPATIENT
Start: 2022-10-11 | End: 2022-10-12

## 2022-10-11 RX ORDER — ALBUMIN HUMAN 25 %
100 VIAL (ML) INTRAVENOUS ONCE
Refills: 0 | Status: COMPLETED | OUTPATIENT
Start: 2022-10-11 | End: 2022-10-11

## 2022-10-11 RX ORDER — HEPARIN SODIUM 5000 [USP'U]/ML
1050 INJECTION INTRAVENOUS; SUBCUTANEOUS
Qty: 25000 | Refills: 0 | Status: DISCONTINUED | OUTPATIENT
Start: 2022-10-11 | End: 2022-10-21

## 2022-10-11 RX ORDER — HUMAN INSULIN 100 [IU]/ML
22 INJECTION, SUSPENSION SUBCUTANEOUS EVERY 6 HOURS
Refills: 0 | Status: DISCONTINUED | OUTPATIENT
Start: 2022-10-11 | End: 2022-10-12

## 2022-10-11 RX ADMIN — FLUCONAZOLE 150 MILLIGRAM(S): 150 TABLET ORAL at 22:07

## 2022-10-11 RX ADMIN — PANTOPRAZOLE SODIUM 40 MILLIGRAM(S): 20 TABLET, DELAYED RELEASE ORAL at 12:59

## 2022-10-11 RX ADMIN — Medication 4: at 13:08

## 2022-10-11 RX ADMIN — Medication 20 MILLIGRAM(S): at 05:53

## 2022-10-11 RX ADMIN — Medication 125 MILLIGRAM(S): at 05:53

## 2022-10-11 RX ADMIN — Medication 250 MILLIGRAM(S): at 07:53

## 2022-10-11 RX ADMIN — HUMAN INSULIN 22 UNIT(S): 100 INJECTION, SUSPENSION SUBCUTANEOUS at 13:09

## 2022-10-11 RX ADMIN — CHLORHEXIDINE GLUCONATE 15 MILLILITER(S): 213 SOLUTION TOPICAL at 18:24

## 2022-10-11 RX ADMIN — CHLORHEXIDINE GLUCONATE 15 MILLILITER(S): 213 SOLUTION TOPICAL at 05:53

## 2022-10-11 RX ADMIN — MAGNESIUM OXIDE 400 MG ORAL TABLET 400 MILLIGRAM(S): 241.3 TABLET ORAL at 22:07

## 2022-10-11 RX ADMIN — Medication 6: at 05:51

## 2022-10-11 RX ADMIN — HUMAN INSULIN 19 UNIT(S): 100 INJECTION, SUSPENSION SUBCUTANEOUS at 01:20

## 2022-10-11 RX ADMIN — SODIUM CHLORIDE 10 MILLILITER(S): 9 INJECTION INTRAMUSCULAR; INTRAVENOUS; SUBCUTANEOUS at 03:32

## 2022-10-11 RX ADMIN — CHLORHEXIDINE GLUCONATE 1 APPLICATION(S): 213 SOLUTION TOPICAL at 05:41

## 2022-10-11 RX ADMIN — Medication 250 MILLIGRAM(S): at 18:23

## 2022-10-11 RX ADMIN — Medication 125 MILLIGRAM(S): at 18:24

## 2022-10-11 RX ADMIN — Medication 3 MILLILITER(S): at 12:57

## 2022-10-11 RX ADMIN — Medication 3 MILLILITER(S): at 06:24

## 2022-10-11 RX ADMIN — Medication 1 TABLET(S): at 12:59

## 2022-10-11 RX ADMIN — Medication 3 MILLILITER(S): at 17:51

## 2022-10-11 RX ADMIN — Medication 2: at 18:19

## 2022-10-11 RX ADMIN — MEXILETINE HYDROCHLORIDE 200 MILLIGRAM(S): 150 CAPSULE ORAL at 05:53

## 2022-10-11 RX ADMIN — HEPARIN SODIUM 10.5 UNIT(S)/HR: 5000 INJECTION INTRAVENOUS; SUBCUTANEOUS at 03:31

## 2022-10-11 RX ADMIN — Medication 25 MILLIGRAM(S): at 05:53

## 2022-10-11 RX ADMIN — Medication 125 MICROGRAM(S): at 12:59

## 2022-10-11 RX ADMIN — Medication 0.5 MILLIGRAM(S): at 06:24

## 2022-10-11 RX ADMIN — Medication 8 MILLIGRAM(S): at 05:52

## 2022-10-11 RX ADMIN — MAGNESIUM OXIDE 400 MG ORAL TABLET 400 MILLIGRAM(S): 241.3 TABLET ORAL at 13:11

## 2022-10-11 RX ADMIN — Medication 4: at 01:19

## 2022-10-11 RX ADMIN — Medication 1 APPLICATION(S): at 13:10

## 2022-10-11 RX ADMIN — MAGNESIUM OXIDE 400 MG ORAL TABLET 400 MILLIGRAM(S): 241.3 TABLET ORAL at 05:53

## 2022-10-11 RX ADMIN — HUMAN INSULIN 19 UNIT(S): 100 INJECTION, SUSPENSION SUBCUTANEOUS at 05:51

## 2022-10-11 RX ADMIN — MEXILETINE HYDROCHLORIDE 200 MILLIGRAM(S): 150 CAPSULE ORAL at 22:07

## 2022-10-11 RX ADMIN — Medication 25 MILLIGRAM(S): at 18:30

## 2022-10-11 RX ADMIN — Medication 50 MILLILITER(S): at 11:15

## 2022-10-11 RX ADMIN — MEXILETINE HYDROCHLORIDE 200 MILLIGRAM(S): 150 CAPSULE ORAL at 13:09

## 2022-10-11 RX ADMIN — Medication 3 MILLILITER(S): at 00:03

## 2022-10-11 RX ADMIN — HUMAN INSULIN 22 UNIT(S): 100 INJECTION, SUSPENSION SUBCUTANEOUS at 18:21

## 2022-10-11 RX ADMIN — Medication 0.5 MILLIGRAM(S): at 17:51

## 2022-10-11 NOTE — PROGRESS NOTE ADULT - PROBLEM SELECTOR PLAN 2
On chronic steroids at home: medrol 8mg qd   Completed stress steroid doses/taper  Re started Prednisone 8mg qd 10/8  Will follow up pt status

## 2022-10-11 NOTE — PROGRESS NOTE ADULT - SUBJECTIVE AND OBJECTIVE BOX
CHIEF COMPLAINT: f/up sob, chronic resp failure, TBM, severe persistent asthma, VC dysfunction, Type A aortic dissection s/p repair w/modified "Bentall procedure and hemiarch replacement 9/6-vented and more awake s/p trach    Interval Events: trached 10/10-tolerated well    REVIEW OF SYSTEMS:  Constitutional: No fevers or chills. No weight loss. No fatigue or generalized malaise.  Eyes: No itching or discharge from the eyes  ENT: No ear pain. No ear discharge. No nasal congestion. No post nasal drip. No epistaxis. No throat pain. No sore throat. No difficulty swallowing.   CV: No chest pain. No palpitations. No lightheadedness or dizziness.   Resp: No dyspnea at rest. No dyspnea on exertion. No orthopnea. No wheezing. No cough. No stridor. No sputum production. No chest pain with respiration.  GI: No nausea. No vomiting. No diarrhea.  MSK: No joint pain or pain in any extremities  Integumentary: No skin lesions. No pedal edema.  Neurological: No gross motor weakness. No sensory changes.  [ ] All other systems negative  [+] Unable to assess ROS because _semi-sedated_______    OBJECTIVE:  ICU Vital Signs Last 24 Hrs  T(C): 37.3 (11 Oct 2022 04:00), Max: 37.6 (10 Oct 2022 20:00)  T(F): 99.1 (11 Oct 2022 04:00), Max: 99.7 (10 Oct 2022 20:00)  HR: 75 (11 Oct 2022 05:00) (59 - 97)  BP: --  BP(mean): --  ABP: 138/55 (11 Oct 2022 05:00) (92/43 - 158/60)  ABP(mean): 83 (11 Oct 2022 05:00) (58 - 95)  RR: 13 (11 Oct 2022 05:00) (12 - 22)  SpO2: 99% (11 Oct 2022 05:00) (96% - 100%)    O2 Parameters below as of 11 Oct 2022 04:00  Patient On (Oxygen Delivery Method): ventilator    O2 Concentration (%): 40      Mode: AC/ CMV (Assist Control/ Continuous Mandatory Ventilation), RR (machine): 16, TV (machine): 450, FiO2: 40, PEEP: 5, ITime: 1, MAP: 9, PIP: 24    10-09 @ 07:01  -  10-10 @ 07:00  --------------------------------------------------------  IN: 2168.1 mL / OUT: 4610 mL / NET: -2441.9 mL    10-10 @ 07:01  -  10-11 @ 05:45  --------------------------------------------------------  IN: 2748.9 mL / OUT: 4145 mL / NET: -1396.1 mL      CAPILLARY BLOOD GLUCOSE  166 (10 Oct 2022 17:00)      POCT Blood Glucose.: 272 mg/dL (11 Oct 2022 05:38)      PHYSICAL EXAM: NAD in bed on vent  General: Awake but semi sedated  HEENT: Atraumatic, normocephalic.                 Mallampatti Grade 3                No nasal congestion.                No tonsillar or pharyngeal exudates.  Lymph Nodes: No palpable lymphadenopathy  Neck: No JVD. No carotid bruit.   Respiratory: Normal chest expansion                         Normal percussion                         Normal and equal air entry                         No wheeze, rhonchi or rales.  Cardiovascular: S1 S2 normal. No murmurs, rubs or gallops.   Abdomen: Soft, non-tender, non-distended. No organomegaly. Normoactive bowel sounds.  Extremities: Warm to touch. Peripheral pulse palpable. + pedal edema.   Skin: No rashes or skin lesions  Neurological: Motor and sensory examination equal and normal in all four extremities.  Psychiatry: ? Appropriate mood and affect.    HOSPITAL MEDICATIONS:  MEDICATIONS  (STANDING):  albuterol/ipratropium for Nebulization 3 milliLiter(s) Nebulizer every 6 hours  buDESOnide    Inhalation Suspension 0.5 milliGRAM(s) Inhalation every 12 hours  chlorhexidine 0.12% Liquid 15 milliLiter(s) Oral Mucosa every 12 hours  chlorhexidine 2% Cloths 1 Application(s) Topical <User Schedule>  collagenase Ointment 1 Application(s) Topical daily  dextrose 50% Injectable 50 milliLiter(s) IV Push every 15 minutes  digoxin  Injectable 125 MICROGram(s) IV Push daily  fluconAZOLE IVPB 600 milliGRAM(s) IV Intermittent every 24 hours  furosemide    Tablet 20 milliGRAM(s) Oral two times a day  heparin  Infusion 1050 Unit(s)/Hr (10.5 mL/Hr) IV Continuous <Continuous>  insulin lispro (ADMELOG) corrective regimen sliding scale   SubCutaneous every 6 hours  insulin NPH human recombinant 19 Unit(s) SubCutaneous every 6 hours  magnesium oxide 400 milliGRAM(s) Oral every 8 hours  methylPREDNISolone 8 milliGRAM(s) Oral daily  metoprolol tartrate 25 milliGRAM(s) Oral two times a day  mexiletine 200 milliGRAM(s) Oral every 8 hours  multivitamin 1 Tablet(s) Oral daily  pantoprazole  Injectable 40 milliGRAM(s) IV Push daily  sodium chloride 0.9%. 1000 milliLiter(s) (10 mL/Hr) IV Continuous <Continuous>  vancomycin    Solution 125 milliGRAM(s) Oral every 12 hours  vancomycin  IVPB 1000 milliGRAM(s) IV Intermittent every 12 hours    MEDICATIONS  (PRN):      LABS:                        8.3    16.47 )-----------( 279      ( 11 Oct 2022 00:48 )             27.1     10-11    138  |  101  |  10  ----------------------------<  208<H>  4.4   |  26  |  0.43<L>    Ca    8.8      11 Oct 2022 00:48  Phos  2.5     10-11  Mg     2.3     10-11    TPro  6.6  /  Alb  3.2<L>  /  TBili  1.0  /  DBili  x   /  AST  44<H>  /  ALT  64<H>  /  AlkPhos  154<H>  10-11    PT/INR - ( 11 Oct 2022 03:01 )   PT: 12.9 sec;   INR: 1.12 ratio         PTT - ( 11 Oct 2022 03:01 )  PTT:29.8 sec    Arterial Blood Gas:  10-11 @ 00:00  7.50/36/173/28/98.3/4.7  ABG lactate: --  Arterial Blood Gas:  10-10 @ 21:55  7.45/40/117/28/99.2/3.5  ABG lactate: --  Arterial Blood Gas:  10-10 @ 16:47  7.45/43/94/30/98.6/5.4  ABG lactate: --  Arterial Blood Gas:  10-10 @ 08:23  7.43/45/162/30/98.5/5.0  ABG lactate: --  Arterial Blood Gas:  10-10 @ 00:00  7.43/47/190/31/98.2/6.2  ABG lactate: --  Arterial Blood Gas:  10-09 @ 17:13  7.48/42/134/31/98.1/7.1  ABG lactate: --        MICROBIOLOGY:     RADIOLOGY:  [ ] Reviewed and interpreted by me    Point of Care Ultrasound Findings:    PFT:    EKG:

## 2022-10-11 NOTE — PROGRESS NOTE ADULT - PROBLEM SELECTOR PLAN 1
-test BG q6h if NPO/TF   -Increased NPH dose to 22 units q 6h while on TFs. HOLD IF TFS ARE OFF.  -C/w Admelog moderate correction scale q6h  Discharge plan:  - Likely to discharge patient home on basal/bolus insulin. Final regimen pending clinical course.  - Recommend routine outpatient ophthalmology, podiatry  - Can f/u with endocrinologist Dr. Saavedra.  - Make sure pt has Rx for all DM supplies and insulin/ DM meds.  -Based on pt's clinical condition and mental status at this time she is not able to independently manage her DM. Will evaluate pt's ability to manage DM at time of discharge. If unable> pt will need family/ care giver (s) to manage DM care at home  -Will need rehab.

## 2022-10-11 NOTE — PROGRESS NOTE ADULT - SUBJECTIVE AND OBJECTIVE BOX
DIABETES FOLLOW UP NOTE: Saw pt earlier today    Chief Complaint: Endocrine consult requested for management of T2DM    INTERVAL HX: Saw pt in CTU, now s/p trach done yesterday, off pressors and per staff and team tolerating TFs of Glucerna at 55 cc/hr. BG mostly above goal while on present NPH doses (200s). No hypoglycemia. On antibiotic for sepsis with improving transaminitis. Back on Prednisone 8mg daily.  Pt awake and able to nod  yes and no to questions.     Review of Systems:  General: As above  Cardiovascular: No chest pain, palpitations  Respiratory: No SOB, no cough  GI: No nausea, vomiting, abdominal pain  Endocrine: No S&Sx of hypoglycemia    Allergies    aspirin (Short breath)  Avelox (Short breath; Pruritus)  cefepime (Anaphylaxis)  codeine (Short breath)  Dilaudid (Short breath)  iodine (Short breath; Swelling)  penicillin (Anaphylaxis)  shellfish (Anaphylaxis)  tetanus toxoid (Short breath)  Valium (Short breath)    Intolerances      MEDICATIONS:  fluconAZOLE IVPB 600 milliGRAM(s) IV Intermittent every 24 hours  insulin lispro (ADMELOG) corrective regimen sliding scale   SubCutaneous every 6 hours  insulin NPH human recombinant 22 Unit(s) SubCutaneous every 6 hours  methylPREDNISolone 8 milliGRAM(s) Oral daily  vancomycin    Solution 125 milliGRAM(s) Oral every 12 hours  vancomycin  IVPB 1000 milliGRAM(s) IV Intermittent every 12 hours      PHYSICAL EXAM:  VITALS: T(C): 36.9 (10-11-22 @ 11:00)  T(F): 98.4 (10-11-22 @ 11:00), Max: 99.7 (10-10-22 @ 20:00)  HR: 84 (10-11-22 @ 14:00) (63 - 97)  BP: --  RR:  (13 - 22)  SpO2:  (96% - 100%)  Wt(kg): --  GENERAL: Female laying in bed in NAD.  HEENT: Trach in place, NGT with TFs on at 55cc/hr  Chest: Sternal incision healed  Abdomen: Soft, nontender, non distended  Extremities: Warm, venodynes on. + edema UEs.   NEURO: Alert limited interaction during encounter. Able to nod to some questions      LABS:  POCT Blood Glucose.: 246 mg/dL (10-11-22 @ 13:03)  POCT Blood Glucose.: 272 mg/dL (10-11-22 @ 05:38)  POCT Blood Glucose.: 208 mg/dL (10-11-22 @ 00:29)  POCT Blood Glucose.: 168 mg/dL (10-10-22 @ 16:36)  POCT Blood Glucose.: 229 mg/dL (10-10-22 @ 13:37)  POCT Blood Glucose.: 102 mg/dL (10-10-22 @ 05:05)  POCT Blood Glucose.: 113 mg/dL (10-09-22 @ 23:11)  POCT Blood Glucose.: 219 mg/dL (10-09-22 @ 17:14)  POCT Blood Glucose.: 129 mg/dL (10-09-22 @ 12:12)  POCT Blood Glucose.: 141 mg/dL (10-09-22 @ 05:29)  POCT Blood Glucose.: 209 mg/dL (10-08-22 @ 23:12)  POCT Blood Glucose.: 239 mg/dL (10-08-22 @ 17:52)                            8.3    16.47 )-----------( 279      ( 11 Oct 2022 00:48 )             27.1       10-11    138  |  101  |  10  ----------------------------<  208<H>  4.4   |  26  |  0.43<L>    eGFR: 102    Ca    8.8      10-11  Mg     2.3     10-11  Phos  2.5     10-11    TPro  6.6  /  Alb  3.2<L>  /  TBili  1.0  /  DBili  x   /  AST  44<H>  /  ALT  64<H>  /  AlkPhos  154<H>  10-11    Thyroid Function Tests:  10-03 @ 04:47 TSH 0.32 FreeT4 -- T3 -- Anti TPO -- Anti Thyroglobulin Ab -- TSI --    A1C with Estimated Average Glucose Result: 9.4 % (09-06-22 @ 16:46)      Estimated Average Glucose: 223 mg/dL (09-06-22 @ 16:46)

## 2022-10-11 NOTE — PROGRESS NOTE ADULT - TIME BILLING
as above: s/p  trach 10/10-hopeful for weaning to restart--remains in resp failure-sepsis dxoamcnlug-JKBR-tahtaff vented/ABX-stable CV status  multifactorial dyspnea-resp failure-severe persistent asthma, TBM s/p tracheoplasty, s/p Aortic aneurysm repair, Bronchitis (proteus)-O2-keep 90%; wean as able off sedation    ****MOVE to TC if able in full awake status  severe persistent asthma--medrol 8mg, singulair 10, duoneb q 6, budes .5 bid, tezspire 8/29-next 9/29  TBM-s/p tracheoplasty--accapella, unable to use vest due to surgery  s/p Aortic aneurysm repair--as per [facility] staff care  AF-on heparin--eventual eliquis rx               CV-improved cardene-MAP above 60  DVT R-IJ-on heparin rx  ID-s/p proteus bronchitis-s/p meropenum changed to ceftriaxone and back to meropenem/vanco- off of ABX as of 9/19--restart of ABX 9/27-meropenem/vanco-NOW solely vanco for MRSE sepsis and fluconazole as per ID  PT-OOB as able (on hold)                              GI-TF as able--f/up LFTs       VC dysfunction--aspiration precautions-for trach 10/10  GOC                                    Heme onc f/up colon ca  prog--critical in PICU                PT-when/if improved    Eulalio Allison MD-Pulmonary   [phone]

## 2022-10-11 NOTE — CHART NOTE - NSCHARTNOTEFT_GEN_A_CORE
Nutrition Follow Up Note  Patient seen for: malnutrition follow up.    Chart reviewed, events noted. Pt is a 71F w/h/o uncontrolled T2DM (A1C 9.4%) on basal/bolus insulin PTA. Unknown DM complications. Also COPD, secondary adrenal Insufficiency on chronic steroids, colorectal cancer s/p resection (colostomy bag), Chronic A fib on Eliquis, and tracheomalacia and multiple intubations, recent dx of OM presented to ED at [facility] with epigastric pain, belching and central chest pain. Found on CTA to have type A aortic dissection and transferred to [facility] for surgical evaluation by Dr. Cabrera. Now POD# 16 Type A aortic dissection repair.     Source: [] Patient       [x] EMR        [] RN        [] Family at bedside       [x] Other: team    -If unable to interview patient: [x] Trach/Vent/BiPAP  [] Disoriented/confused/inappropriate to interview    Diet, NPO with Tube Feed:   Tube Feeding Modality: Nasogastric  Glucerna 1.5 Chago (GLUCERNA1.5RTH)  Total Volume for 24 Hours (mL): 1320  Continuous  Starting Tube Feed Rate {mL per Hour}: 55  Until Goal Tube Feed Rate (mL per Hour): 55  Tube Feed Duration (in Hours): 24  Tube Feed Start Time: 10:00 (10-10-22 @ 16:49)    EN Order Provides: 1320mL total volume, 1980kcal, 109g protein, 1002mL free water.  Current Pump Rate: 55mL  EN provision per flow sheets:   - 10/10: 770mL - pt NPO for trach   - 10/9: 660mL - NPO after MN for trach   - 10/8: 1320mL    - 10/7: 1045mL - NGT clogged and replaced   - 10/6: 1320mL  5-Day EN average: 1023mL or 78% goal volume    Nutrition-related concerns:   - Pt s/p tracheostomy 10/10.   - Endocrinology following for BG management, pt ordered for SSI + NPH 22 units q6H   - IVF: NS @ 10mL/hr.     GI: +colostomy, 50mL output 10/11.   Bowel Regimen? [] Yes   [x] No   - Antibiotic regimen noted    Weights:   Daily Weight in k.9 (10-11), Weight in k.3 (10-10), Weight in k.8 (10-09), Weight in k.3 (10-07), Weight in k.1 (10-06), Weight in k.1 (10-05), Weight in k.8 (10-04), Weight in k.7 (10-03), Weight in k.4 (10-02), Weight in k.8 (10-01), Weight in k.4 (), Weight in k.1 (), Weight in k.5 ()   - wt fluctuations noted, will continue to monitor     MEDICATIONS  (STANDING):  albumin human 25% IVPB 100 milliLiter(s) IV Intermittent once  albuterol/ipratropium for Nebulization 3 milliLiter(s) Nebulizer every 6 hours  buDESOnide    Inhalation Suspension 0.5 milliGRAM(s) Inhalation every 12 hours  chlorhexidine 0.12% Liquid 15 milliLiter(s) Oral Mucosa every 12 hours  chlorhexidine 2% Cloths 1 Application(s) Topical <User Schedule>  collagenase Ointment 1 Application(s) Topical daily  dextrose 50% Injectable 50 milliLiter(s) IV Push every 15 minutes  digoxin  Injectable 125 MICROGram(s) IV Push daily  fluconAZOLE IVPB 600 milliGRAM(s) IV Intermittent every 24 hours  heparin  Infusion 1050 Unit(s)/Hr (11.5 mL/Hr) IV Continuous <Continuous>  insulin lispro (ADMELOG) corrective regimen sliding scale   SubCutaneous every 6 hours  insulin NPH human recombinant 22 Unit(s) SubCutaneous every 6 hours  magnesium oxide 400 milliGRAM(s) Oral every 8 hours  methylPREDNISolone 8 milliGRAM(s) Oral daily  metoprolol tartrate 25 milliGRAM(s) Oral two times a day  mexiletine 200 milliGRAM(s) Oral every 8 hours  multivitamin 1 Tablet(s) Oral daily  pantoprazole  Injectable 40 milliGRAM(s) IV Push daily  sodium chloride 0.9%. 1000 milliLiter(s) (10 mL/Hr) IV Continuous <Continuous>  vancomycin    Solution 125 milliGRAM(s) Oral every 12 hours  vancomycin  IVPB 1000 milliGRAM(s) IV Intermittent every 12 hours    Pertinent Labs: 10- @ 00:48: Na 138, BUN 10, Cr 0.43<L>, <H>, K+ 4.4, Phos 2.5, Mg 2.3, Alk Phos 154<H>, ALT/SGPT 64<H>, AST/SGOT 44<H>, HbA1c --  10-10 @ 16:51: Na 136, BUN 9, Cr 0.42<L>, <H>, K+ 4.7, Phos 3.3, Mg 1.8, Alk Phos 145<H>, ALT/SGPT 57<H>, AST/SGOT 37, HbA1c --    A1C with Estimated Average Glucose Result: 9.4 % (22 @ 16:46)  A1C with Estimated Average Glucose Result: 9.2 % (22 @ 07:36)  A1C with Estimated Average Glucose Result: 8.0 % (05-10-22 @ 12:26)    Finger Sticks:   POCT Blood Glucose.: 272 mg/dL (11 Oct 2022 05:38)  POCT Blood Glucose.: 208 mg/dL (11 Oct 2022 00:29)  POCT Blood Glucose.: 168 mg/dL (10 Oct 2022 16:36)  POCT Blood Glucose.: 229 mg/dL (10 Oct 2022 13:37)    Skin per nursing documentation: No noted pressure injuries as per documentation, midsternal incision  Edema: +1 generalized, 2+ bilateral leg, 3+ right arm    Based on UBW 62.3kg with consideration for trach to vent  Estimated Energy Needs: 1682 - 1994kcal (27 - 32kcal/kg)   Estimated Protein Needs: 87 - 112g (1.4 - 1.8g/kg)  Estimated Fluid Needs: per team.    Previous Nutrition Diagnosis: Moderate Acute Malnutrition   Nutrition Diagnosis is: [x] ongoing  [] resolved [] not applicable     New Nutrition Diagnosis: n/a    Nutrition Care Plan:  [x] In Progress - being addressed with EN as feasible  [] Achieved  [] Not applicable    Nutrition Interventions:     Education Provided:       [] Yes:  [x] No: N/A    Recommendations:   1. Continue Glucerna 1.5 at goal rate of 55mL/hr x 24hr to provide 1320mL total volume, 1980kcal, 109g protein, 1002mL free water. Regimen meets 31kcal/kg, 1.7g/kg protein based on UBW 62.3kg.    - Defer additional free water flushes to team.    - Monitor ostomy output.  2. Continue multivitamin as medically feasible.  3. Monitor GI tolerance to feeds, RD remains available to adjust TF regimen/formulary as needed/upon request.     Monitoring and Evaluation:   Continue to monitor nutritional intake, tolerance to diet prescription, weights, labs, skin integrity    RD remains available upon request and will follow up per protocol    Deedee Rosas MS, RD, CDN, [facility] Pager #[phone]

## 2022-10-11 NOTE — PROGRESS NOTE ADULT - ASSESSMENT
72 YO F with PMH of  COPD, Chronic Adrenal Insufficiency on Chronic prednisone, history of colorectal cancer s/p resection (colostomy bag), Hx of CAD, Chronic A fib on Eliquis, and tracheomalacia and multiple intubations found on CTA to have type A aortic dissection and transferred to [facility] for surgical evaluation by Dr. Cabrera. ENT called to evaluate for tracheostomy.  Pt failed weaning trials and is not a candidate for extubation as per primary team. Now S/P #8 Portex Cuffed  Tracheostomy POD # 1.  Doing well on the ventilator. No bleeding noted.      S Plasty Text: Given the location and shape of the defect, and the orientation of relaxed skin tension lines, an S-plasty was deemed most appropriate for repair.  Using a sterile surgical marker, the appropriate outline of the S-plasty was drawn, incorporating the defect and placing the expected incisions within the relaxed skin tension lines where possible.  The area thus outlined was incised deep to adipose tissue with a #15 scalpel blade.  The skin margins were undermined to an appropriate distance in all directions utilizing iris scissors. The skin flaps were advanced over the defect.  The opposing margins were then approximated with interrupted buried subcutaneous sutures.

## 2022-10-11 NOTE — PROGRESS NOTE ADULT - PROBLEM SELECTOR PLAN 1
Problem: Tracheostomy in place.   ·  Plan: - Keep the Trach site clean and dry.   - Remove Sutures on POD #7.   - Keep the Omniflex for a week to avoid the manipulation of the stoma.   - Elevate HOB.   - Gentle suction prn.   - Vent per RT.   - ENT will follow.   - Call with questions.

## 2022-10-11 NOTE — PROGRESS NOTE ADULT - ASSESSMENT
71 year-old female with a history of DM2, CAD, A-Fib on apixaban, Severe Persistent Asthma (on chronic steroids, recently started on Tezspire), colon cancer s/p resection/chemo, and tracheobronchomalacia s/p tracheoplasty, and recent OM of the R foot s/b debridement and completed course of Vancomycin/Ertapenem for MRSA/ESBL Proteus/Corynebacterium who now presents with chest pain, found to have Type A dissection s/p repair with modified Bentall procedure and hemiarch replacement on 22. Post-op course complicated by acute hypoxemic respiratory failure, shock, anemia, and hyperglycemia requiring insulin gtt. Extubated , now awake and alert with mild encephalopathy but overall significantly improved.    Assessment:  Acute hypoxemic respiratory failure requiring intubation  Type A Aortic Dissection  Severe Persistent Asthma  History of Tracheobronchomalacia    Plan:  See below:  -**************************                                SEE Below:  -improving but weakness  9/15-ABX adjusted to ceftriaxone (LFTS)-PICU  -PICU, low grade temp-fever work up in progress, no resp decline or sx  -resp stable at present but tenuous  -for FEESST today, NGT in place w/TF  -s/p FEESST-passed, remains in PICU          -TF in place at 40cc, more OOB          -hypoglycemia noted and rx, no change in resp status  -vented/sedated-pressors/inotropes/ABX  -remains vented on nitrous/less O2 needs, improving LFTS                   -vented/semi sedated--less O2 needs  10/3-on vanco, weaning sedation/, all lines changed  10/6-no changes-now afebrile-on vanco   10/7-no weaning-remains off pressors  10/10-for trach, no weaning done               10/11-s/p trach-uneventful

## 2022-10-11 NOTE — PROGRESS NOTE ADULT - SUBJECTIVE AND OBJECTIVE BOX
ENT ISSUE/POD:S/P #8 Portex  Cuffed Tracheostomy POD # 1.        HPI: 72 YO F with PMH of  COPD, Chronic Adrenal Insufficiency on Chronic prednisone, history of colorectal cancer s/p resection (colostomy bag), Hx of CAD, Chronic A fib on Eliquis, and tracheomalacia and multiple intubations, recent dx of OM presented to ED at [facility] with epigastric pain, belching and central chest pain. Found on CTA to have type A aortic dissection and transferred to [facility] for surgical evaluation by Dr. Cabrera. ENT called to evaluate for tracheostomy.  Pt failed weaning trials and is not a candidate for extubation as per primary team. Now S/P #8 Portex Cuffed  Tracheostomy POD # 1.  Doing well on the ventilator. No bleeding noted. PEEP:5, FiO2:40%.          PAST MEDICAL & SURGICAL HISTORY:  Atrial fibrillation  paroxysmal, on eliquis      Diabetes  Type 2      COPD (chronic obstructive pulmonary disease)      Adrenal insufficiency  Medrol daily for over 50 years      Aortic insufficiency  moderate AR on echo 5/3/2018      Pelvic fracture      Asthma      Tracheobronchomalacia  diagnosed 2015, s/p bronchial thermoplasty 2016 (Dr Zapien); recent bronchoscopy 6/5/2018 revealed no evidence of tracheobronchomalacia in trachea or bronchial tubes      Colorectal cancer  4/2018- last treatment , chemo and radiation      Rectal bleeding      Seizure  x 1 1/7/18      DVT (deep venous thrombosis)  15-20 years ago, took coumadin      TIA (transient ischemic attack)  multiple, last 5 years ago - presents as right-sided weakness      History of partial hysterectomy  30 years ago - fibroids      H/O total knee replacement, bilateral  5 years ago      History of sinus surgery  multiple sinus surgeries      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      H/O pelvic surgery  5 years ago - s/p fracture      History of tracheomalacia  2015 - attempted tracheal stenting ([facility])- course complicated by obstruction, respiratory failure, multiple CPR attempts -  stent discontinued; 10/20/2016 Tracheobronchoplasty (Prolene Mesh) performed at [facility] by Dr Zapien      S/P bronchoscopy  6/5/2018 - [place] (Dr Zapien) no evidence of tracheobronchomalacia in trachea or bronchial tubes      Rectal bleeding  exam under anesthesia ([facility]) 2/2018        Allergies    aspirin (Short breath)  Avelox (Short breath; Pruritus)  cefepime (Anaphylaxis)  codeine (Short breath)  Dilaudid (Short breath)  iodine (Short breath; Swelling)  penicillin (Anaphylaxis)  shellfish (Anaphylaxis)  tetanus toxoid (Short breath)  Valium (Short breath)    Intolerances      MEDICATIONS  (STANDING):  albuterol/ipratropium for Nebulization 3 milliLiter(s) Nebulizer every 6 hours  buDESOnide    Inhalation Suspension 0.5 milliGRAM(s) Inhalation every 12 hours  chlorhexidine 0.12% Liquid 15 milliLiter(s) Oral Mucosa every 12 hours  chlorhexidine 2% Cloths 1 Application(s) Topical <User Schedule>  collagenase Ointment 1 Application(s) Topical daily  dextrose 50% Injectable 50 milliLiter(s) IV Push every 15 minutes  digoxin  Injectable 125 MICROGram(s) IV Push daily  fluconAZOLE IVPB 600 milliGRAM(s) IV Intermittent every 24 hours  furosemide    Tablet 20 milliGRAM(s) Oral two times a day  heparin  Infusion 1050 Unit(s)/Hr (10.5 mL/Hr) IV Continuous <Continuous>  insulin lispro (ADMELOG) corrective regimen sliding scale   SubCutaneous every 6 hours  insulin NPH human recombinant 19 Unit(s) SubCutaneous every 6 hours  magnesium oxide 400 milliGRAM(s) Oral every 8 hours  methylPREDNISolone 8 milliGRAM(s) Oral daily  metoprolol tartrate 25 milliGRAM(s) Oral two times a day  mexiletine 200 milliGRAM(s) Oral every 8 hours  multivitamin 1 Tablet(s) Oral daily  pantoprazole  Injectable 40 milliGRAM(s) IV Push daily  sodium chloride 0.9%. 1000 milliLiter(s) (10 mL/Hr) IV Continuous <Continuous>  vancomycin    Solution 125 milliGRAM(s) Oral every 12 hours  vancomycin  IVPB 1000 milliGRAM(s) IV Intermittent every 12 hours    MEDICATIONS  (PRN):      Social History: see consult note    Family history: see consult note    ROS:   ENT: all negative except as noted in HPI   Pulm: denies SOB, cough, hemoptysis  Neuro: denies numbness/tingling, loss of sensation  Endo: denies heat/cold intolerance, excessive sweating      Vital Signs Last 24 Hrs  T(C): 37.1 (11 Oct 2022 07:00), Max: 37.6 (10 Oct 2022 20:00)  T(F): 98.8 (11 Oct 2022 07:00), Max: 99.7 (10 Oct 2022 20:00)  HR: 81 (11 Oct 2022 08:00) (60 - 97)  BP: --  BP(mean): --  RR: 18 (11 Oct 2022 08:00) (12 - 22)  SpO2: 99% (11 Oct 2022 08:00) (96% - 100%)    Parameters below as of 11 Oct 2022 06:25  Patient On (Oxygen Delivery Method): ventilator                              8.3    16.47 )-----------( 279      ( 11 Oct 2022 00:48 )             27.1    10-11    138  |  101  |  10  ----------------------------<  208<H>  4.4   |  26  |  0.43<L>    Ca    8.8      11 Oct 2022 00:48  Phos  2.5     10-11  Mg     2.3     10-11    TPro  6.6  /  Alb  3.2<L>  /  TBili  1.0  /  DBili  x   /  AST  44<H>  /  ALT  64<H>  /  AlkPhos  154<H>  10-11   PT/INR - ( 11 Oct 2022 03:01 )   PT: 12.9 sec;   INR: 1.12 ratio         PTT - ( 11 Oct 2022 03:01 )  PTT:29.8 sec    PHYSICAL EXAM:  Gen: sitting in chair, on vent  Skin: No rashes, bruises, or lesions  Head: Normocephalic, Atraumatic  Face Right facial swelling: no erythema, or fluctuance. Parotid glands soft without mass  Eyes: no scleral injection  Nose: Nares bilaterally patent, no discharge  Mouth: No Stridor / Drooling / Trismus.  Mucosa moist, tongue/uvula midline, oropharynx clear  Neck: Portex #8 cuffed trach in place, velcro tie in place  Flat, supple, no lymphadenopathy, trachea midline, no masses  Lymphatic: No lymphadenopathy  Resp: on vent  Neuro: facial nerve intact, no facial droop         ENT ISSUE/POD:S/P #8 Portex  Cuffed Tracheostomy POD # 1.        HPI: 72 YO F with PMH of  COPD, Chronic Adrenal Insufficiency on Chronic prednisone, history of colorectal cancer s/p resection (colostomy bag), Hx of CAD, Chronic A fib on Eliquis, and tracheomalacia and multiple intubations, recent dx of OM presented to ED at [facility] with epigastric pain, belching and central chest pain. Found on CTA to have type A aortic dissection and transferred to [facility] for surgical evaluation by Dr. Cabrera. ENT called to evaluate for tracheostomy.  Pt failed weaning trials and is not a candidate for extubation as per primary team. Now S/P #8 Portex Cuffed  Tracheostomy POD # 1.  Doing well on the ventilator. No bleeding noted. PEEP:5, FiO2:40%.          PAST MEDICAL & SURGICAL HISTORY:  Atrial fibrillation  paroxysmal, on eliquis      Diabetes  Type 2      COPD (chronic obstructive pulmonary disease)      Adrenal insufficiency  Medrol daily for over 50 years      Aortic insufficiency  moderate AR on echo 5/3/2018      Pelvic fracture      Asthma      Tracheobronchomalacia  diagnosed 2015, s/p bronchial thermoplasty 2016 (Dr Zapien); recent bronchoscopy 6/5/2018 revealed no evidence of tracheobronchomalacia in trachea or bronchial tubes      Colorectal cancer  4/2018- last treatment , chemo and radiation      Rectal bleeding      Seizure  x 1 1/7/18      DVT (deep venous thrombosis)  15-20 years ago, took coumadin      TIA (transient ischemic attack)  multiple, last 5 years ago - presents as right-sided weakness      History of partial hysterectomy  30 years ago - fibroids      H/O total knee replacement, bilateral  5 years ago      History of sinus surgery  multiple sinus surgeries      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      H/O pelvic surgery  5 years ago - s/p fracture      History of tracheomalacia  2015 - attempted tracheal stenting ([facility])- course complicated by obstruction, respiratory failure, multiple CPR attempts -  stent discontinued; 10/20/2016 Tracheobronchoplasty (Prolene Mesh) performed at [facility] by Dr Zapien      S/P bronchoscopy  6/5/2018 - [place] (Dr Zapien) no evidence of tracheobronchomalacia in trachea or bronchial tubes      Rectal bleeding  exam under anesthesia ([facility]) 2/2018        Allergies    aspirin (Short breath)  Avelox (Short breath; Pruritus)  cefepime (Anaphylaxis)  codeine (Short breath)  Dilaudid (Short breath)  iodine (Short breath; Swelling)  penicillin (Anaphylaxis)  shellfish (Anaphylaxis)  tetanus toxoid (Short breath)  Valium (Short breath)    Intolerances      MEDICATIONS  (STANDING):  albuterol/ipratropium for Nebulization 3 milliLiter(s) Nebulizer every 6 hours  buDESOnide    Inhalation Suspension 0.5 milliGRAM(s) Inhalation every 12 hours  chlorhexidine 0.12% Liquid 15 milliLiter(s) Oral Mucosa every 12 hours  chlorhexidine 2% Cloths 1 Application(s) Topical <User Schedule>  collagenase Ointment 1 Application(s) Topical daily  dextrose 50% Injectable 50 milliLiter(s) IV Push every 15 minutes  digoxin  Injectable 125 MICROGram(s) IV Push daily  fluconAZOLE IVPB 600 milliGRAM(s) IV Intermittent every 24 hours  furosemide    Tablet 20 milliGRAM(s) Oral two times a day  heparin  Infusion 1050 Unit(s)/Hr (10.5 mL/Hr) IV Continuous <Continuous>  insulin lispro (ADMELOG) corrective regimen sliding scale   SubCutaneous every 6 hours  insulin NPH human recombinant 19 Unit(s) SubCutaneous every 6 hours  magnesium oxide 400 milliGRAM(s) Oral every 8 hours  methylPREDNISolone 8 milliGRAM(s) Oral daily  metoprolol tartrate 25 milliGRAM(s) Oral two times a day  mexiletine 200 milliGRAM(s) Oral every 8 hours  multivitamin 1 Tablet(s) Oral daily  pantoprazole  Injectable 40 milliGRAM(s) IV Push daily  sodium chloride 0.9%. 1000 milliLiter(s) (10 mL/Hr) IV Continuous <Continuous>  vancomycin    Solution 125 milliGRAM(s) Oral every 12 hours  vancomycin  IVPB 1000 milliGRAM(s) IV Intermittent every 12 hours    MEDICATIONS  (PRN):      Social History: see consult note    Family history: see consult note    ROS:   ENT: all negative except as noted in HPI   Pulm: denies SOB, cough, hemoptysis  Neuro: denies numbness/tingling, loss of sensation  Endo: denies heat/cold intolerance, excessive sweating      Vital Signs Last 24 Hrs  T(C): 37.1 (11 Oct 2022 07:00), Max: 37.6 (10 Oct 2022 20:00)  T(F): 98.8 (11 Oct 2022 07:00), Max: 99.7 (10 Oct 2022 20:00)  HR: 81 (11 Oct 2022 08:00) (60 - 97)  BP: --  BP(mean): --  RR: 18 (11 Oct 2022 08:00) (12 - 22)  SpO2: 99% (11 Oct 2022 08:00) (96% - 100%)    Parameters below as of 11 Oct 2022 06:25  Patient On (Oxygen Delivery Method): ventilator                              8.3    16.47 )-----------( 279      ( 11 Oct 2022 00:48 )             27.1    10-11    138  |  101  |  10  ----------------------------<  208<H>  4.4   |  26  |  0.43<L>    Ca    8.8      11 Oct 2022 00:48  Phos  2.5     10-11  Mg     2.3     10-11    TPro  6.6  /  Alb  3.2<L>  /  TBili  1.0  /  DBili  x   /  AST  44<H>  /  ALT  64<H>  /  AlkPhos  154<H>  10-11   PT/INR - ( 11 Oct 2022 03:01 )   PT: 12.9 sec;   INR: 1.12 ratio         PTT - ( 11 Oct 2022 03:01 )  PTT:29.8 sec    PHYSICAL EXAM:  Gen: sitting in chair, on vent  Skin: No rashes, bruises, or lesions  Head: Normocephalic, Atraumatic  Face  no erythema, or fluctuance. Parotid glands soft without mass  Eyes: no scleral injection  Nose: Nares bilaterally patent, no discharge  Mouth: No Stridor / Drooling / Trismus.  Mucosa moist, tongue/uvula midline, oropharynx clear  Neck: Portex #8 cuffed trach in place, velcro tie in place  Flat, supple, no lymphadenopathy, trachea midline, no masses  Lymphatic: No lymphadenopathy  Resp: on vent  Neuro: facial nerve intact, no facial droop

## 2022-10-11 NOTE — PROGRESS NOTE ADULT - NSPROGADDITIONALINFOA_GEN_ALL_CORE
-Plan discussed with pt/team.  Contact info: [phone] (24/7). pager [phone]  [email or web address] password [email or web address]  Teams  Spent 28 minutes assessing pt/labs/meds and discussing plan of care with primary team  Adjusting insulin  Discharge plan  Follow up care

## 2022-10-11 NOTE — PROGRESS NOTE ADULT - ASSESSMENT
71F w/h/o uncontrolled T2DM (A1C 9.4%) on basal/bolus insulin PTA. Unknown DM complications. Also COPD, secondary adrenal Insufficiency on chronic steroids, colorectal cancer s/p resection (colostomy bag), Chronic A fib on Eliquis, and tracheomalacia and multiple intubations, recent dx of OM presented to ED at [facility] with epigastric pain, belching and central chest pain. Found on CTA to have type A aortic dissection and transferred to [facility] for surgical evaluation by Dr. Cabrera. Now s/p aortic dissection repair on 9/07/22. Endocrine consulted for assistance with uncontrolled DM/steroids for AI. Pt in ICU with ventricular dysfunction/sepsis, and now s/p trach 10/10, off pressors and tolerating TFs with BG above goal while on present insulin doses. Will continue to increase NPH insulin to BG goal 100 [id number].   Pt remains with leukocytosis and improving transaminitis. Back on Prednisone dose 8mg

## 2022-10-11 NOTE — PROGRESS NOTE ADULT - SUBJECTIVE AND OBJECTIVE BOX
Patient seen and examined at the bedside.    Remained critically ill on continuous ICU monitoring.    OBJECTIVE:  Vital Signs Last 24 Hrs  T(C): 37.3 (11 Oct 2022 04:00), Max: 37.6 (10 Oct 2022 20:00)  T(F): 99.1 (11 Oct 2022 04:00), Max: 99.7 (10 Oct 2022 20:00)  HR: 90 (11 Oct 2022 07:00) (60 - 97)  BP: --  BP(mean): --  RR: 19 (11 Oct 2022 07:00) (12 - 22)  SpO2: 99% (11 Oct 2022 07:00) (96% - 100%)    Parameters below as of 11 Oct 2022 06:25  Patient On (Oxygen Delivery Method): ventilator          Physical Exam:   General: intubated  Neurology: somnolent, but able to follow simple commands  ENT/Neck: Neck supple, trachea midline, No JVD  Respiratory: rhonchi throughout    CV: S1S2, no murmurs        [x] Sinus Rhythm   Abdominal: Soft, NT, ND, colostomy in place  Extremities: +4 RUE edema, 3+LUE edema, 2 + pedal edema noted, + peripheral pulses   Skin: Dry dressing over right heel, no Rashes, Hematoma, Ecchymosis , R elbow wound w/ eschar            Assessment:  71F PMH DM, COPD, Chronic Adrenal Insufficiency on Chronic prednisone, history of colorectal cancer s/p resection (colostomy bag), Hx of CAD, Chronic A fib on Eliquis, and tracheomalacia and multiple intubations, recent dx of OM presented to ED at [facility] this morning with epigastric pain, belching and central chest pain. Found on CTA to have type A aortic dissection and transferred to [facility] for surgical evaluation by Dr. Cabrera.     Ascending aortic dissection s/p type A dissection repair and biobentall on 9/7   Respiratory failure s/p Trach 10/10/22  Hypovolemia   Post op respiratory failure   Acute blood loss anemia  Stress hyperglycemia   Leukocytosis   RIJ thrombus  MRSA PNA      Plan:   ***Neuro***  Off sedation, follows simple commands, continue to monitor  PT/OT progress as tolerated      ***Cardiovascular***  Limited TTE on 10/1: EF 69%, Hyperdynamic left ventricular systolic function. There is hypokinesis of the mid-base inferior wall. Nml RV fxn.   CT chest on 9/28: No CT evidence of pulmonary embolism. Status post repair of ascending aortic dissection with a stable dissection flap originating from the aortic arch and extending into the infrarenal abdominal aorta,  B/l UE duplex on 10/5: As before, there is an occluded RIGHT IJ vein with thrombosis extending to brachiocephalic vein. Superficial thrombophlebitis of the LEFT cephalic vein. heparin drip held for OR today, will f/u H/H post PRBC and if stable with no signs of bleeding will resume heparin drip tonight  Invasive hemodynamic monitoring, assess perfusion indices   SR / CVP 10/ / Hct 27.1/ Lactate 1,2  Rate control with Mexiletine Digoxin, and Lopressor   [x] AC Therapy with Heparin  [x] Volume: [x] Albumin 250cc overnight, [x] PRBC 1U this AM  Reassessment of hemodynamics  Serial EKG and cardiac enzymes       ***Pulmonary***  Respiratory failure s/p Trach #8 portex  10/10/22, per ENT inner cannula needs to be changed daily  Post op vent management   Titration of FiO2 and PEEP, follow SpO2, CXR, blood gasses   Continue bronchodilators duoneb, pulmacort  plan for CPAP in the morning, TC trials to be started when appropriate as well    Mode: AC/ CMV (Assist Control/ Continuous Mandatory Ventilation)  RR (machine): 16  TV (machine): 450  FiO2: 40  PEEP: 5  ITime: 1  MAP: 9  PIP: 24              ***GI***  [x] Diet:  TFs, Glucerna 1.5 @ 55cc/hr   [x] Protonix       ***Renal***  Continue to monitor I/Os, BUN/Creatinine.   Replete lytes PRN  Seven present   Diuresis with Lasix         ***ID***  SCx on 10/4+Methicillin resistant Staphylococcus aureus, c/w IVPB Vancomycin, next trough 4am 10/11 (plan for 6 week course in setting of valve surgery, ?11/8 end date)  9/27 blood culture Candida Glabarata now on flucanazole (lifelong suppression)  PO Vancomycin solution for C.diff prophylaxis   R elbow wound w/ eschar-> wound team evaluated, recommending ortho consult for further joint work up- waiting xrays      ***Endocrine***  [x]  DM : HbA1c 9.4%                - [x] ISS  [x] NPH              - Need tight glycemic control to prevent wound infection.          Patient requires continuous monitoring with bedside rhythm monitoring, pulse oximetry monitoring, and continuous central venous and arterial pressure monitoring; and intermittent blood gas analysis. Care plan discussed with the ICU care team.   Patient remained critical, at risk for life threatening decompensation.    I have spent 30 minutes providing critical care management to this patient.    By signing my name below, I, Kieran Plascencia, attest that this documentation has been prepared under the direction and in the presence of Simone Garcia NP   Electronically signed: Georgi Cordero, 10-11-22 @ 08:08    I, Simone Garcia NP, personally performed the services described in this documentation. all medical record entries made by the marcyibronaldo were at my direction and in my presence. I have reviewed the chart and agree that the record reflects my personal performance and is accurate and complete  Electronically signed: Simone Garcia NP  Patient seen and examined at the bedside.    Remained critically ill on continuous ICU monitoring.    OBJECTIVE:  Vital Signs Last 24 Hrs  T(C): 37.3 (11 Oct 2022 04:00), Max: 37.6 (10 Oct 2022 20:00)  T(F): 99.1 (11 Oct 2022 04:00), Max: 99.7 (10 Oct 2022 20:00)  HR: 90 (11 Oct 2022 07:00) (60 - 97)  BP: --  BP(mean): --  RR: 19 (11 Oct 2022 07:00) (12 - 22)  SpO2: 99% (11 Oct 2022 07:00) (96% - 100%)    Parameters below as of 11 Oct 2022 06:25  Patient On (Oxygen Delivery Method): ventilator          Physical Exam:   General: intubated  Neurology: somnolent, but able to follow simple commands  ENT/Neck: Neck supple, trachea midline, No JVD  Respiratory: rhonchi throughout    CV: S1S2, no murmurs        [x] Sinus Rhythm   Abdominal: Soft, NT, ND, colostomy in place  Extremities: +4 RUE edema, 3+LUE edema, 2 + pedal edema noted, + peripheral pulses   Skin: Dry dressing over right heel, no Rashes, Hematoma, Ecchymosis , R elbow wound w/ eschar            Assessment:  71F PMH DM, COPD, Chronic Adrenal Insufficiency on Chronic prednisone, history of colorectal cancer s/p resection (colostomy bag), Hx of CAD, Chronic A fib on Eliquis, and tracheomalacia and multiple intubations, recent dx of OM presented to ED at [facility] this morning with epigastric pain, belching and central chest pain. Found on CTA to have type A aortic dissection and transferred to [facility] for surgical evaluation by Dr. Cabrera.     Ascending aortic dissection s/p type A dissection repair and biobentall on 9/7   Respiratory failure s/p Trach 10/10/22  Hypovolemia   Post op respiratory failure   Acute blood loss anemia  Stress hyperglycemia   Leukocytosis   RIJ thrombus  MRSA PNA      Plan:   ***Neuro***  Off sedation, follows simple commands, continue to monitor  PT/OT progress as tolerated      ***Cardiovascular***  Limited TTE on 10/1: EF 69%, Hyperdynamic left ventricular systolic function. There is hypokinesis of the mid-base inferior wall. Nml RV fxn.   CT chest on 9/28: No CT evidence of pulmonary embolism. Status post repair of ascending aortic dissection with a stable dissection flap originating from the aortic arch and extending into the infrarenal abdominal aorta,  B/l UE duplex on 10/5: As before, there is an occluded RIGHT IJ vein with thrombosis extending to brachiocephalic vein. Superficial thrombophlebitis of the LEFT cephalic vein. heparin drip held for OR today, will f/u H/H post PRBC and if stable with no signs of bleeding will resume heparin drip tonight  Invasive hemodynamic monitoring, assess perfusion indices   SR / CVP 10/ / Hct 27.1/ Lactate 1,2  Rate control with Mexiletine Digoxin, and Lopressor   [x] AC Therapy with Heparin  [x] Volume: [x] Albumin 250cc overnight, [x] PRBC 1U this AM  Reassessment of hemodynamics  Serial EKG and cardiac enzymes       ***Pulmonary***  Respiratory failure s/p Trach #8 portex  10/10/22, per ENT inner cannula needs to be changed daily  Post op vent management   Titration of FiO2 and PEEP, follow SpO2, CXR, blood gasses   Continue bronchodilators duoneb, pulmacort  plan for CPAP in the morning, Plan for TC today      Mode: AC/ CMV (Assist Control/ Continuous Mandatory Ventilation)  RR (machine): 16  TV (machine): 450  FiO2: 40  PEEP: 5  ITime: 1  MAP: 9  PIP: 24              ***GI***  [x] Diet:  TFs, Glucerna 1.5 @ 55cc/hr   [x] Protonix       ***Renal***  Continue to monitor I/Os, BUN/Creatinine.   Replete lytes PRN  Seven present   Diuresis with Lasix         ***ID***  SCx on 10/4+Methicillin resistant Staphylococcus aureus, c/w IVPB Vancomycin, next trough 4am 10/11 (plan for 6 week course in setting of valve surgery, ?11/8 end date)  9/27 blood culture Candida Glabarata now on flucanazole (lifelong suppression)  PO Vancomycin solution for C.diff prophylaxis   R elbow wound w/ eschar-> wound team evaluated, recommending ortho consult for further joint work up- waiting xrays      ***Endocrine***  [x]  DM : HbA1c 9.4%                - [x] ISS  [x] NPH              - Need tight glycemic control to prevent wound infection.          Patient requires continuous monitoring with bedside rhythm monitoring, pulse oximetry monitoring, and continuous central venous and arterial pressure monitoring; and intermittent blood gas analysis. Care plan discussed with the ICU care team.   Patient remained critical, at risk for life threatening decompensation.    I have spent 30 minutes providing critical care management to this patient.    By signing my name below, I, Kieran Plascencia, attest that this documentation has been prepared under the direction and in the presence of Simone Garcia NP   Electronically signed: Rogers Cordero, 10-11-22 @ 08:08    I, Simone Garcia NP, personally performed the services described in this documentation. all medical record entries made by the rogers were at my direction and in my presence. I have reviewed the chart and agree that the record reflects my personal performance and is accurate and complete  Electronically signed: Simone Garcia NP  Patient seen and examined at the bedside.    Remained critically ill on continuous ICU monitoring.    OBJECTIVE:  Vital Signs Last 24 Hrs  T(C): 37.3 (11 Oct 2022 04:00), Max: 37.6 (10 Oct 2022 20:00)  T(F): 99.1 (11 Oct 2022 04:00), Max: 99.7 (10 Oct 2022 20:00)  HR: 90 (11 Oct 2022 07:00) (60 - 97)  BP: --  BP(mean): --  RR: 19 (11 Oct 2022 07:00) (12 - 22)  SpO2: 99% (11 Oct 2022 07:00) (96% - 100%)    Parameters below as of 11 Oct 2022 06:25  Patient On (Oxygen Delivery Method): ventilator          Physical Exam:   General: intubated  Neurology: somnolent, but able to follow simple commands  ENT/Neck: Neck supple, trachea midline, No JVD  Respiratory: rhonchi throughout    CV: S1S2, no murmurs        [x] Sinus Rhythm   Abdominal: Soft, NT, ND, colostomy in place  Extremities: +4 RUE edema, 3+LUE edema, 2 + pedal edema noted, + peripheral pulses   Skin: Dry dressing over right heel, no Rashes, Hematoma, Ecchymosis , R elbow wound w/ eschar            Assessment:  71F PMH DM, COPD, Chronic Adrenal Insufficiency on Chronic prednisone, history of colorectal cancer s/p resection (colostomy bag), Hx of CAD, Chronic A fib on Eliquis, and tracheomalacia and multiple intubations, recent dx of OM presented to ED at [facility] this morning with epigastric pain, belching and central chest pain. Found on CTA to have type A aortic dissection and transferred to [facility] for surgical evaluation by Dr. Cabrera.     Ascending aortic dissection s/p type A dissection repair and biobentall on 9/7   Respiratory failure s/p Trach 10/10/22  Hypovolemia   Post op respiratory failure   Acute blood loss anemia  Stress hyperglycemia   Leukocytosis   RIJ thrombus  MRSA PNA      Plan:   ***Neuro***  Off sedation, follows simple commands, continue to monitor  PT/OT progress as tolerated      ***Cardiovascular***  Limited TTE on 10/1: EF 69%, Hyperdynamic left ventricular systolic function. There is hypokinesis of the mid-base inferior wall. Nml RV fxn.   CT chest on 9/28: No CT evidence of pulmonary embolism. Status post repair of ascending aortic dissection with a stable dissection flap originating from the aortic arch and extending into the infrarenal abdominal aorta,  B/l UE duplex on 10/5: As before, there is an occluded RIGHT IJ vein with thrombosis extending to brachiocephalic vein. Superficial thrombophlebitis of the LEFT cephalic vein. heparin drip held for OR today, will f/u H/H post PRBC and if stable with no signs of bleeding will resume heparin drip tonight  Invasive hemodynamic monitoring, assess perfusion indices   SR / CVP 10/ / Hct 27.1/ Lactate 1,2  Rate control with Mexiletine Digoxin, and Lopressor   [x] AC Therapy with Heparin  [x] Volume: [x] Albumin 25%-100ml x1 [x] PRBC 1U this AM  Reassessment of hemodynamics  Serial EKG and cardiac enzymes       ***Pulmonary***  Respiratory failure s/p Trach #8 portex  10/10/22, per ENT inner cannula needs to be changed daily  Post op vent management   Titration of FiO2 and PEEP, follow SpO2, CXR, blood gasses   Continue bronchodilators duoneb, pulmacort  plan for CPAP in the morning, Plan for TC trials today      Mode: AC/ CMV (Assist Control/ Continuous Mandatory Ventilation)  RR (machine): 16  TV (machine): 450  FiO2: 40  PEEP: 5  ITime: 1  MAP: 9  PIP: 24              ***GI***  [x] Diet:  TFs, Glucerna 1.5 @ 55cc/hr   [x] Protonix       ***Renal***  Continue to monitor I/Os, BUN/Creatinine.   Replete lytes PRN  Seven present   Diuresis with Lasix d/c-> contraction alkalosis         ***ID***  SCx on 10/4+Methicillin resistant Staphylococcus aureus, c/w IVPB Vancomycin, next trough 4am 10/11 (plan for 6 week course in setting of valve surgery, ?11/8 end date)  9/27 blood culture Candida Glabarata now on flucanazole (lifelong suppression)  PO Vancomycin solution for C.diff prophylaxis   R elbow wound w/ eschar-> wound team evaluated, recommending ortho consult for further joint work up- xrays done today  Will re-consult wound care for further managment      ***Endocrine***  [x]  DM : HbA1c 9.4%                - [x] ISS  [x] NPH              - Need tight glycemic control to prevent wound infection.          Patient requires continuous monitoring with bedside rhythm monitoring, pulse oximetry monitoring, and continuous central venous and arterial pressure monitoring; and intermittent blood gas analysis. Care plan discussed with the ICU care team.   Patient remained critical, at risk for life threatening decompensation.    I have spent 40 minutes providing critical care management to this patient.    By signing my name below, I, Kieran Plascencia, attest that this documentation has been prepared under the direction and in the presence of Simone Garcia NP   Electronically signed: Rogers Cordero, 10-11-22 @ 08:08    IRadha Mirabile NP, personally performed the services described in this documentation. all medical record entries made by the orgers were at my direction and in my presence. I have reviewed the chart and agree that the record reflects my personal performance and is accurate and complete  Electronically signed: Simone Garcia NP

## 2022-10-11 NOTE — PROGRESS NOTE ADULT - NUTRITIONAL ASSESSMENT
Diet, NPO with Tube Feed:   Tube Feeding Modality: Nasogastric  Glucerna 1.5 Chago (GLUCERNA1.5RTH)  Total Volume for 24 Hours (mL): 1320  Continuous  Starting Tube Feed Rate {mL per Hour}: 55  Until Goal Tube Feed Rate (mL per Hour): 55  Tube Feed Duration (in Hours): 24  Tube Feed Start Time: 10:00 (10-10-22 @ 16:49) [Active]    Please see RD assessment and/or follow up.  Managed by primary team as well

## 2022-10-12 LAB
ALBUMIN SERPL ELPH-MCNC: 3.3 G/DL — SIGNIFICANT CHANGE UP (ref 3.3–5)
ALBUMIN SERPL ELPH-MCNC: 3.8 G/DL — SIGNIFICANT CHANGE UP (ref 3.3–5)
ALP SERPL-CCNC: 171 U/L — HIGH (ref 40–120)
ALP SERPL-CCNC: 215 U/L — HIGH (ref 40–120)
ALT FLD-CCNC: 66 U/L — HIGH (ref 10–45)
ALT FLD-CCNC: 85 U/L — HIGH (ref 10–45)
ANION GAP SERPL CALC-SCNC: 10 MMOL/L — SIGNIFICANT CHANGE UP (ref 5–17)
ANION GAP SERPL CALC-SCNC: 10 MMOL/L — SIGNIFICANT CHANGE UP (ref 5–17)
APTT BLD: 56.1 SEC — HIGH (ref 27.5–35.5)
AST SERPL-CCNC: 47 U/L — HIGH (ref 10–40)
AST SERPL-CCNC: 54 U/L — HIGH (ref 10–40)
BILIRUB SERPL-MCNC: 0.5 MG/DL — SIGNIFICANT CHANGE UP (ref 0.2–1.2)
BILIRUB SERPL-MCNC: 0.6 MG/DL — SIGNIFICANT CHANGE UP (ref 0.2–1.2)
BUN SERPL-MCNC: 11 MG/DL — SIGNIFICANT CHANGE UP (ref 7–23)
BUN SERPL-MCNC: 20 MG/DL — SIGNIFICANT CHANGE UP (ref 7–23)
CALCIUM SERPL-MCNC: 8.5 MG/DL — SIGNIFICANT CHANGE UP (ref 8.4–10.5)
CALCIUM SERPL-MCNC: 9.2 MG/DL — SIGNIFICANT CHANGE UP (ref 8.4–10.5)
CHLORIDE SERPL-SCNC: 100 MMOL/L — SIGNIFICANT CHANGE UP (ref 96–108)
CHLORIDE SERPL-SCNC: 105 MMOL/L — SIGNIFICANT CHANGE UP (ref 96–108)
CO2 SERPL-SCNC: 26 MMOL/L — SIGNIFICANT CHANGE UP (ref 22–31)
CO2 SERPL-SCNC: 27 MMOL/L — SIGNIFICANT CHANGE UP (ref 22–31)
CREAT SERPL-MCNC: 0.37 MG/DL — LOW (ref 0.5–1.3)
CREAT SERPL-MCNC: 0.48 MG/DL — LOW (ref 0.5–1.3)
CULTURE RESULTS: SIGNIFICANT CHANGE UP
CULTURE RESULTS: SIGNIFICANT CHANGE UP
EGFR: 106 ML/MIN/1.73M2 — SIGNIFICANT CHANGE UP
EGFR: 99 ML/MIN/1.73M2 — SIGNIFICANT CHANGE UP
GAS PNL BLDA: SIGNIFICANT CHANGE UP
GAS PNL BLDA: SIGNIFICANT CHANGE UP
GLUCOSE BLDC GLUCOMTR-MCNC: 123 MG/DL — HIGH (ref 70–99)
GLUCOSE BLDC GLUCOMTR-MCNC: 137 MG/DL — HIGH (ref 70–99)
GLUCOSE BLDC GLUCOMTR-MCNC: 161 MG/DL — HIGH (ref 70–99)
GLUCOSE BLDC GLUCOMTR-MCNC: 247 MG/DL — HIGH (ref 70–99)
GLUCOSE BLDC GLUCOMTR-MCNC: 284 MG/DL — HIGH (ref 70–99)
GLUCOSE SERPL-MCNC: 124 MG/DL — HIGH (ref 70–99)
GLUCOSE SERPL-MCNC: 292 MG/DL — HIGH (ref 70–99)
HCT VFR BLD CALC: 27.7 % — LOW (ref 34.5–45)
HGB BLD-MCNC: 8.2 G/DL — LOW (ref 11.5–15.5)
MAGNESIUM SERPL-MCNC: 1.7 MG/DL — SIGNIFICANT CHANGE UP (ref 1.6–2.6)
MCHC RBC-ENTMCNC: 23.9 PG — LOW (ref 27–34)
MCHC RBC-ENTMCNC: 29.6 GM/DL — LOW (ref 32–36)
MCV RBC AUTO: 80.8 FL — SIGNIFICANT CHANGE UP (ref 80–100)
NRBC # BLD: 3 /100 WBCS — HIGH (ref 0–0)
PHOSPHATE SERPL-MCNC: 2.2 MG/DL — LOW (ref 2.5–4.5)
PLATELET # BLD AUTO: 311 K/UL — SIGNIFICANT CHANGE UP (ref 150–400)
POTASSIUM SERPL-MCNC: 3.6 MMOL/L — SIGNIFICANT CHANGE UP (ref 3.5–5.3)
POTASSIUM SERPL-MCNC: 5.3 MMOL/L — SIGNIFICANT CHANGE UP (ref 3.5–5.3)
POTASSIUM SERPL-SCNC: 3.6 MMOL/L — SIGNIFICANT CHANGE UP (ref 3.5–5.3)
POTASSIUM SERPL-SCNC: 5.3 MMOL/L — SIGNIFICANT CHANGE UP (ref 3.5–5.3)
PROT SERPL-MCNC: 6.4 G/DL — SIGNIFICANT CHANGE UP (ref 6–8.3)
PROT SERPL-MCNC: 7.3 G/DL — SIGNIFICANT CHANGE UP (ref 6–8.3)
RBC # BLD: 3.43 M/UL — LOW (ref 3.8–5.2)
RBC # FLD: 26.2 % — HIGH (ref 10.3–14.5)
SODIUM SERPL-SCNC: 137 MMOL/L — SIGNIFICANT CHANGE UP (ref 135–145)
SODIUM SERPL-SCNC: 141 MMOL/L — SIGNIFICANT CHANGE UP (ref 135–145)
SPECIMEN SOURCE: SIGNIFICANT CHANGE UP
SPECIMEN SOURCE: SIGNIFICANT CHANGE UP
VANCOMYCIN TROUGH SERPL-MCNC: 8.6 UG/ML — LOW (ref 10–20)
WBC # BLD: 18.87 K/UL — HIGH (ref 3.8–10.5)
WBC # FLD AUTO: 18.87 K/UL — HIGH (ref 3.8–10.5)

## 2022-10-12 PROCEDURE — 99233 SBSQ HOSP IP/OBS HIGH 50: CPT

## 2022-10-12 PROCEDURE — 31645 BRNCHSC W/THER ASPIR 1ST: CPT | Mod: 78

## 2022-10-12 PROCEDURE — 99231 SBSQ HOSP IP/OBS SF/LOW 25: CPT

## 2022-10-12 PROCEDURE — 71045 X-RAY EXAM CHEST 1 VIEW: CPT | Mod: 26,76

## 2022-10-12 PROCEDURE — 99232 SBSQ HOSP IP/OBS MODERATE 35: CPT

## 2022-10-12 PROCEDURE — 76882 US LMTD JT/FCL EVL NVASC XTR: CPT | Mod: 26,RT

## 2022-10-12 PROCEDURE — 99291 CRITICAL CARE FIRST HOUR: CPT | Mod: 25

## 2022-10-12 RX ORDER — HUMAN INSULIN 100 [IU]/ML
25 INJECTION, SUSPENSION SUBCUTANEOUS EVERY 6 HOURS
Refills: 0 | Status: DISCONTINUED | OUTPATIENT
Start: 2022-10-12 | End: 2022-10-14

## 2022-10-12 RX ORDER — HUMAN INSULIN 100 [IU]/ML
20 INJECTION, SUSPENSION SUBCUTANEOUS ONCE
Refills: 0 | Status: COMPLETED | OUTPATIENT
Start: 2022-10-12 | End: 2022-10-12

## 2022-10-12 RX ORDER — CHLORHEXIDINE GLUCONATE 213 G/1000ML
15 SOLUTION TOPICAL EVERY 12 HOURS
Refills: 0 | Status: DISCONTINUED | OUTPATIENT
Start: 2022-10-12 | End: 2022-10-22

## 2022-10-12 RX ORDER — ACETYLCYSTEINE 200 MG/ML
4 VIAL (ML) MISCELLANEOUS EVERY 12 HOURS
Refills: 0 | Status: DISCONTINUED | OUTPATIENT
Start: 2022-10-12 | End: 2022-10-24

## 2022-10-12 RX ORDER — MEROPENEM 1 G/30ML
1000 INJECTION INTRAVENOUS ONCE
Refills: 0 | Status: COMPLETED | OUTPATIENT
Start: 2022-10-12 | End: 2022-10-12

## 2022-10-12 RX ORDER — HYDRALAZINE HCL 50 MG
10 TABLET ORAL EVERY 8 HOURS
Refills: 0 | Status: DISCONTINUED | OUTPATIENT
Start: 2022-10-12 | End: 2022-10-13

## 2022-10-12 RX ORDER — DEXMEDETOMIDINE HYDROCHLORIDE IN 0.9% SODIUM CHLORIDE 4 UG/ML
0.2 INJECTION INTRAVENOUS
Qty: 200 | Refills: 0 | Status: DISCONTINUED | OUTPATIENT
Start: 2022-10-12 | End: 2022-10-12

## 2022-10-12 RX ORDER — ACETYLCYSTEINE 200 MG/ML
4 VIAL (ML) MISCELLANEOUS ONCE
Refills: 0 | Status: COMPLETED | OUTPATIENT
Start: 2022-10-12 | End: 2022-10-12

## 2022-10-12 RX ORDER — ALBUTEROL 90 UG/1
2.5 AEROSOL, METERED ORAL ONCE
Refills: 0 | Status: COMPLETED | OUTPATIENT
Start: 2022-10-12 | End: 2022-10-12

## 2022-10-12 RX ORDER — MEROPENEM 1 G/30ML
1000 INJECTION INTRAVENOUS EVERY 8 HOURS
Refills: 0 | Status: COMPLETED | OUTPATIENT
Start: 2022-10-12 | End: 2022-10-19

## 2022-10-12 RX ORDER — PROPOFOL 10 MG/ML
20 INJECTION, EMULSION INTRAVENOUS
Qty: 500 | Refills: 0 | Status: DISCONTINUED | OUTPATIENT
Start: 2022-10-12 | End: 2022-10-13

## 2022-10-12 RX ORDER — MAGNESIUM SULFATE 500 MG/ML
2 VIAL (ML) INJECTION ONCE
Refills: 0 | Status: COMPLETED | OUTPATIENT
Start: 2022-10-12 | End: 2022-10-12

## 2022-10-12 RX ORDER — POTASSIUM CHLORIDE 20 MEQ
10 PACKET (EA) ORAL
Refills: 0 | Status: COMPLETED | OUTPATIENT
Start: 2022-10-12 | End: 2022-10-12

## 2022-10-12 RX ORDER — LABETALOL HCL 100 MG
5 TABLET ORAL ONCE
Refills: 0 | Status: COMPLETED | OUTPATIENT
Start: 2022-10-12 | End: 2022-10-12

## 2022-10-12 RX ADMIN — MEXILETINE HYDROCHLORIDE 200 MILLIGRAM(S): 150 CAPSULE ORAL at 22:23

## 2022-10-12 RX ADMIN — Medication 125 MILLIGRAM(S): at 06:20

## 2022-10-12 RX ADMIN — CHLORHEXIDINE GLUCONATE 15 MILLILITER(S): 213 SOLUTION TOPICAL at 06:19

## 2022-10-12 RX ADMIN — Medication 3 MILLILITER(S): at 06:19

## 2022-10-12 RX ADMIN — MEROPENEM 100 MILLIGRAM(S): 1 INJECTION INTRAVENOUS at 06:27

## 2022-10-12 RX ADMIN — HUMAN INSULIN 22 UNIT(S): 100 INJECTION, SUSPENSION SUBCUTANEOUS at 12:11

## 2022-10-12 RX ADMIN — Medication 10 MILLIGRAM(S): at 22:23

## 2022-10-12 RX ADMIN — CHLORHEXIDINE GLUCONATE 1 APPLICATION(S): 213 SOLUTION TOPICAL at 06:19

## 2022-10-12 RX ADMIN — Medication 2: at 17:34

## 2022-10-12 RX ADMIN — Medication 50 MILLIEQUIVALENT(S): at 02:35

## 2022-10-12 RX ADMIN — MEXILETINE HYDROCHLORIDE 200 MILLIGRAM(S): 150 CAPSULE ORAL at 15:06

## 2022-10-12 RX ADMIN — Medication 3 MILLILITER(S): at 17:59

## 2022-10-12 RX ADMIN — Medication 0.5 MILLIGRAM(S): at 18:00

## 2022-10-12 RX ADMIN — Medication 50 MILLIEQUIVALENT(S): at 01:44

## 2022-10-12 RX ADMIN — Medication 3 MILLILITER(S): at 01:22

## 2022-10-12 RX ADMIN — MAGNESIUM OXIDE 400 MG ORAL TABLET 400 MILLIGRAM(S): 241.3 TABLET ORAL at 06:42

## 2022-10-12 RX ADMIN — ALBUTEROL 2.5 MILLIGRAM(S): 90 AEROSOL, METERED ORAL at 14:51

## 2022-10-12 RX ADMIN — Medication 125 MILLIGRAM(S): at 17:27

## 2022-10-12 RX ADMIN — Medication 63.75 MILLIMOLE(S): at 01:43

## 2022-10-12 RX ADMIN — PANTOPRAZOLE SODIUM 40 MILLIGRAM(S): 20 TABLET, DELAYED RELEASE ORAL at 11:39

## 2022-10-12 RX ADMIN — HUMAN INSULIN 25 UNIT(S): 100 INJECTION, SUSPENSION SUBCUTANEOUS at 17:34

## 2022-10-12 RX ADMIN — MAGNESIUM OXIDE 400 MG ORAL TABLET 400 MILLIGRAM(S): 241.3 TABLET ORAL at 22:23

## 2022-10-12 RX ADMIN — DEXMEDETOMIDINE HYDROCHLORIDE IN 0.9% SODIUM CHLORIDE 3.35 MICROGRAM(S)/KG/HR: 4 INJECTION INTRAVENOUS at 11:41

## 2022-10-12 RX ADMIN — Medication 4 MILLILITER(S): at 18:01

## 2022-10-12 RX ADMIN — HUMAN INSULIN 20 UNIT(S): 100 INJECTION, SUSPENSION SUBCUTANEOUS at 00:24

## 2022-10-12 RX ADMIN — Medication 4: at 06:19

## 2022-10-12 RX ADMIN — Medication 8 MILLIGRAM(S): at 06:20

## 2022-10-12 RX ADMIN — Medication 0.5 MILLIGRAM(S): at 06:19

## 2022-10-12 RX ADMIN — Medication 25 MILLIGRAM(S): at 18:13

## 2022-10-12 RX ADMIN — Medication 250 MILLIGRAM(S): at 17:27

## 2022-10-12 RX ADMIN — Medication 1 APPLICATION(S): at 11:40

## 2022-10-12 RX ADMIN — Medication 50 MILLIEQUIVALENT(S): at 03:09

## 2022-10-12 RX ADMIN — Medication 1 TABLET(S): at 11:39

## 2022-10-12 RX ADMIN — Medication 250 MILLIGRAM(S): at 06:45

## 2022-10-12 RX ADMIN — Medication 125 MICROGRAM(S): at 11:39

## 2022-10-12 RX ADMIN — Medication 25 GRAM(S): at 03:48

## 2022-10-12 RX ADMIN — MEROPENEM 100 MILLIGRAM(S): 1 INJECTION INTRAVENOUS at 22:24

## 2022-10-12 RX ADMIN — FLUCONAZOLE 150 MILLIGRAM(S): 150 TABLET ORAL at 22:00

## 2022-10-12 RX ADMIN — MEROPENEM 100 MILLIGRAM(S): 1 INJECTION INTRAVENOUS at 15:05

## 2022-10-12 RX ADMIN — MEXILETINE HYDROCHLORIDE 200 MILLIGRAM(S): 150 CAPSULE ORAL at 06:20

## 2022-10-12 RX ADMIN — Medication 6: at 12:10

## 2022-10-12 RX ADMIN — MAGNESIUM OXIDE 400 MG ORAL TABLET 400 MILLIGRAM(S): 241.3 TABLET ORAL at 15:05

## 2022-10-12 RX ADMIN — Medication 3 MILLILITER(S): at 12:00

## 2022-10-12 RX ADMIN — Medication 5 MILLIGRAM(S): at 00:47

## 2022-10-12 RX ADMIN — NICARDIPINE HYDROCHLORIDE 15 MG/HR: 30 CAPSULE, EXTENDED RELEASE ORAL at 11:40

## 2022-10-12 RX ADMIN — CHLORHEXIDINE GLUCONATE 15 MILLILITER(S): 213 SOLUTION TOPICAL at 17:27

## 2022-10-12 RX ADMIN — Medication 25 MILLIGRAM(S): at 06:20

## 2022-10-12 RX ADMIN — HUMAN INSULIN 22 UNIT(S): 100 INJECTION, SUSPENSION SUBCUTANEOUS at 06:19

## 2022-10-12 RX ADMIN — Medication 4 MILLILITER(S): at 14:50

## 2022-10-12 NOTE — PROGRESS NOTE ADULT - SUBJECTIVE AND OBJECTIVE BOX
ENT ISSUE/POD: S/P #8 Portex  Cuffed Tracheostomy POD # 2    HPI: 72yo Female s/p #8 Portex Cuffed Tracheostomy POD #2. Pt seen and examined at bedside. No acute events overnight. Doing well on CPAP with no reported issues.         PAST MEDICAL & SURGICAL HISTORY:  Atrial fibrillation  paroxysmal, on eliquis      Diabetes  Type 2      COPD (chronic obstructive pulmonary disease)      Adrenal insufficiency  Medrol daily for over 50 years      Aortic insufficiency  moderate AR on echo 5/3/2018      Pelvic fracture      Asthma      Tracheobronchomalacia  diagnosed 2015, s/p bronchial thermoplasty 2016 (Dr Zapien); recent bronchoscopy 6/5/2018 revealed no evidence of tracheobronchomalacia in trachea or bronchial tubes      Colorectal cancer  4/2018- last treatment , chemo and radiation      Rectal bleeding      Seizure  x 1 1/7/18      DVT (deep venous thrombosis)  15-20 years ago, took coumadin      TIA (transient ischemic attack)  multiple, last 5 years ago - presents as right-sided weakness      History of partial hysterectomy  30 years ago - fibroids      H/O total knee replacement, bilateral  5 years ago      History of sinus surgery  multiple sinus surgeries      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      H/O pelvic surgery  5 years ago - s/p fracture      History of tracheomalacia  2015 - attempted tracheal stenting ([facility])- course complicated by obstruction, respiratory failure, multiple CPR attempts -  stent discontinued; 10/20/2016 Tracheobronchoplasty (Prolene Mesh) performed at [facility] by Dr Zapien      S/P bronchoscopy  6/5/2018 - [place] (Dr Zapien) no evidence of tracheobronchomalacia in trachea or bronchial tubes      Rectal bleeding  exam under anesthesia ([facility]) 2/2018        Allergies    aspirin (Short breath)  Avelox (Short breath; Pruritus)  cefepime (Anaphylaxis)  codeine (Short breath)  Dilaudid (Short breath)  iodine (Short breath; Swelling)  penicillin (Anaphylaxis)  shellfish (Anaphylaxis)  tetanus toxoid (Short breath)  Valium (Short breath)    Intolerances      MEDICATIONS  (STANDING):  albuterol/ipratropium for Nebulization 3 milliLiter(s) Nebulizer every 6 hours  buDESOnide    Inhalation Suspension 0.5 milliGRAM(s) Inhalation every 12 hours  chlorhexidine 0.12% Liquid 15 milliLiter(s) Oral Mucosa every 12 hours  chlorhexidine 2% Cloths 1 Application(s) Topical <User Schedule>  collagenase Ointment 1 Application(s) Topical daily  dextrose 50% Injectable 50 milliLiter(s) IV Push every 15 minutes  digoxin  Injectable 125 MICROGram(s) IV Push daily  fluconAZOLE IVPB 600 milliGRAM(s) IV Intermittent every 24 hours  heparin  Infusion 1050 Unit(s)/Hr (12.5 mL/Hr) IV Continuous <Continuous>  insulin lispro (ADMELOG) corrective regimen sliding scale   SubCutaneous every 6 hours  insulin NPH human recombinant 22 Unit(s) SubCutaneous every 6 hours  magnesium oxide 400 milliGRAM(s) Oral every 8 hours  methylPREDNISolone 8 milliGRAM(s) Oral daily  metoprolol tartrate 25 milliGRAM(s) Oral two times a day  mexiletine 200 milliGRAM(s) Oral every 8 hours  multivitamin 1 Tablet(s) Oral daily  niCARdipine Infusion 3 mG/Hr (15 mL/Hr) IV Continuous <Continuous>  pantoprazole  Injectable 40 milliGRAM(s) IV Push daily  sodium chloride 0.9%. 1000 milliLiter(s) (10 mL/Hr) IV Continuous <Continuous>  vancomycin    Solution 125 milliGRAM(s) Oral every 12 hours  vancomycin  IVPB 1000 milliGRAM(s) IV Intermittent every 12 hours    MEDICATIONS  (PRN):      Social History: see consult note    Family history: see consult note    ROS:   unable to obtain due to pt's condition       Vital Signs Last 24 Hrs  T(C): 37.9 (12 Oct 2022 08:00), Max: 38 (12 Oct 2022 04:00)  T(F): 100.2 (12 Oct 2022 08:00), Max: 100.4 (12 Oct 2022 04:00)  HR: 59 (12 Oct 2022 09:02) (50 - 91)  BP: --  BP(mean): --  RR: 25 (12 Oct 2022 08:45) (11 - 31)  SpO2: 100% (12 Oct 2022 09:02) (95% - 100%)    Parameters below as of 12 Oct 2022 08:00  Patient On (Oxygen Delivery Method): ventilator    O2 Concentration (%): 40                          8.2    18.87 )-----------( 311      ( 12 Oct 2022 00:34 )             27.7    10-12    141  |  105  |  11  ----------------------------<  124<H>  3.6   |  26  |  0.37<L>    Ca    8.5      12 Oct 2022 00:34  Phos  2.2     10-12  Mg     1.7     10-12    TPro  6.4  /  Alb  3.3  /  TBili  0.5  /  DBili  x   /  AST  47<H>  /  ALT  66<H>  /  AlkPhos  171<H>  10-12   PT/INR - ( 11 Oct 2022 03:01 )   PT: 12.9 sec;   INR: 1.12 ratio         PTT - ( 12 Oct 2022 03:45 )  PTT:56.1 sec      PHYSICAL EXAM:  Gen: NAD  Skin: No rashes, bruises, or lesions  Head: Normocephalic, Atraumatic  Face  no erythema, or fluctuance. Parotid glands soft without mass  Eyes: no scleral injection  Nose: Nares bilaterally patent, no discharge  Mouth: No Stridor / Drooling / Trismus.  Mucosa moist, tongue/uvula midline, oropharynx clear  Neck: Portex #8 cuffed trach in place, velcro tie in place  Flat, supple, no lymphadenopathy, trachea midline, no masses  Lymphatic: No lymphadenopathy  Resp: tolerating CPAP  Neuro: facial nerve intact, no facial droop

## 2022-10-12 NOTE — PROGRESS NOTE ADULT - PROBLEM SELECTOR PLAN 1
- Keep the Trach site clean and dry.   - Remove Sutures on POD #7.   - Keep the Omniflex for a week to avoid the manipulation of the stoma.   - Elevate HOB.   - Gentle suction prn.   - ENT will continue to follow

## 2022-10-12 NOTE — PROGRESS NOTE ADULT - SUBJECTIVE AND OBJECTIVE BOX
Subjective: pt is sedated   no hypoglycemia in last 24 hours.     MEDICATIONS  (STANDING):  acetylcysteine 10%  Inhalation 4 milliLiter(s) Inhalation every 12 hours  albuterol/ipratropium for Nebulization 3 milliLiter(s) Nebulizer every 6 hours  buDESOnide    Inhalation Suspension 0.5 milliGRAM(s) Inhalation every 12 hours  chlorhexidine 0.12% Liquid 15 milliLiter(s) Oral Mucosa every 12 hours  chlorhexidine 2% Cloths 1 Application(s) Topical <User Schedule>  collagenase Ointment 1 Application(s) Topical daily  dextrose 50% Injectable 50 milliLiter(s) IV Push every 15 minutes  digoxin  Injectable 125 MICROGram(s) IV Push daily  fluconAZOLE IVPB 600 milliGRAM(s) IV Intermittent every 24 hours  heparin  Infusion 1050 Unit(s)/Hr (12.5 mL/Hr) IV Continuous <Continuous>  hydrALAZINE 10 milliGRAM(s) Oral every 8 hours  insulin lispro (ADMELOG) corrective regimen sliding scale   SubCutaneous every 6 hours  insulin NPH human recombinant 22 Unit(s) SubCutaneous every 6 hours  magnesium oxide 400 milliGRAM(s) Oral every 8 hours  meropenem  IVPB 1000 milliGRAM(s) IV Intermittent every 8 hours  methylPREDNISolone 8 milliGRAM(s) Oral daily  metoprolol tartrate 25 milliGRAM(s) Oral two times a day  mexiletine 200 milliGRAM(s) Oral every 8 hours  multivitamin 1 Tablet(s) Oral daily  niCARdipine Infusion 3 mG/Hr (15 mL/Hr) IV Continuous <Continuous>  pantoprazole  Injectable 40 milliGRAM(s) IV Push daily  propofol Infusion 20 MICROgram(s)/kG/Min (8.04 mL/Hr) IV Continuous <Continuous>  sodium chloride 0.9%. 1000 milliLiter(s) (10 mL/Hr) IV Continuous <Continuous>  vancomycin    Solution 125 milliGRAM(s) Oral every 12 hours  vancomycin  IVPB 1000 milliGRAM(s) IV Intermittent every 12 hours    MEDICATIONS  (PRN):      PHYSICAL EXAM:  VITALS: T(C): 37.9 (10-12-22 @ 08:00)  T(F): 100.2 (10-12-22 @ 08:00), Max: 100.4 (10-12-22 @ 04:00)  HR: 55 (10-12-22 @ 14:51) (50 - 86)  BP: --  RR:  (11 - 36)  SpO2:  (90% - 100%)  Wt(kg): --  GENERAL: NAD, well-groomed, well-developed  EYES: No proptosis, no injection  HEENT:  Atraumatic, Normocephalic, moist mucous membranes  THYROID: Normal size, no palpable nodules  RESPIRATORY: Clear to auscultation bilaterally; No rales, rhonchi, wheezing, or rubs  CARDIOVASCULAR: Regular rate and rhythm; No murmurs; no peripheral edema  GI: Soft, nontender, non distended, normal bowel sounds  CUSHING'S SIGNS: no striae    POCT Blood Glucose.: 284 mg/dL (10-12-22 @ 12:01)  POCT Blood Glucose.: 247 mg/dL (10-12-22 @ 05:28)  POCT Blood Glucose.: 123 mg/dL (10-12-22 @ 00:09)  POCT Blood Glucose.: 246 mg/dL (10-11-22 @ 13:03)  POCT Blood Glucose.: 272 mg/dL (10-11-22 @ 05:38)  POCT Blood Glucose.: 208 mg/dL (10-11-22 @ 00:29)  POCT Blood Glucose.: 168 mg/dL (10-10-22 @ 16:36)  POCT Blood Glucose.: 229 mg/dL (10-10-22 @ 13:37)  POCT Blood Glucose.: 102 mg/dL (10-10-22 @ 05:05)  POCT Blood Glucose.: 113 mg/dL (10-09-22 @ 23:11)  POCT Blood Glucose.: 219 mg/dL (10-09-22 @ 17:14)    10-12    137  |  100  |  20  ----------------------------<  292<H>  5.3   |  27  |  0.48<L>    eGFR: 99    Ca    9.2      10-12  Mg     1.7     10-12  Phos  2.2     10-12    TPro  7.3  /  Alb  3.8  /  TBili  0.6  /  DBili  x   /  AST  54<H>  /  ALT  85<H>  /  AlkPhos  215<H>  10-12      Thyroid Function Tests:  10-03 @ 04:47 TSH 0.32 FreeT4 -- T3 -- Anti TPO -- Anti Thyroglobulin Ab -- TSI --

## 2022-10-12 NOTE — PROGRESS NOTE ADULT - TIME BILLING
as above: weaning performed  (TC/cpap able) s/p  trach 10/10--remains in resp failure-sepsis [id number]-atnibbe vented/ABX-stable CV status  multifactorial dyspnea-resp failure-severe persistent asthma, TBM s/p tracheoplasty, s/p Aortic aneurysm repair, Bronchitis (proteus)-O2-keep 90%; wean as able off sedation    ****MOVE to advance TC if able in full awake status  severe persistent asthma--medrol 8mg, singulair 10, duoneb q 6, budes .5 bid, tezspire 8/29-next 9/29  TBM-s/p tracheoplasty--accapella, unable to use vest due to surgery  s/p Aortic aneurysm repair--as per CTS staff care  AF-on heparin--eventual eliquis rx               CV-improved cardene-MAP above 60  DVT R-IJ-on heparin rx  ID-s/p proteus bronchitis-s/p meropenum changed to ceftriaxone and back to meropenem/vanco- off of ABX as of 9/19--restart of ABX 9/27-meropenem/vanco-NOW solely vanco for MRSE sepsis and fluconazole as per ID  PT-OOB as able (on hold)                              GI-TF as able--f/up LFTs       VC dysfunction--aspiration precautions-for trach 10/10  GOC                                    Heme onc f/up colon ca  prog--critical in PICU                PT-when/if improved    Eulalio Allison MD-Pulmonary   [phone]

## 2022-10-12 NOTE — CHART NOTE - NSCHARTNOTEFT_GEN_A_CORE
71F PMH DM, COPD, Chronic Adrenal Insufficiency on Chronic prednisone, history of colorectal cancer s/p resection (colostomy bag), Hx of CAD, Chronic A fib on Eliquis, and tracheomalacia and multiple intubations, recent dx of OM presented to ED at [facility] this morning with epigastric pain, belching and central chest pain. Found on CTA to have type A aortic dissection and transferred to [facility] for surgical evaluation by Dr. Cabrera.     Ascending aortic dissection s/p type A dissection repair and biobentall on 9/7   Respiratory failure s/p Trach 10/10/22  Hypovolemia   Post op respiratory failure   Acute blood loss anemia  Stress hyperglycemia   Leukocytosis   RIJ thrombus  MRSA PNA    Wound Consulted for concern for Rt elbow wound- Xray appreciated- no fx/ disloc/ fb/ collection/ OM noted.  Ortho consult noted.  D/w wound attng and CTU NP- will get US to further evaluate as area was boggy and concern for evolving process such as bursitis in addition to skin wound. will remain available as requested.

## 2022-10-12 NOTE — PROGRESS NOTE ADULT - SUBJECTIVE AND OBJECTIVE BOX
Patient seen and examined at the bedside.    Remained critically ill on continuous ICU monitoring.    24 hour events:      OBJECTIVE:  Vital Signs Last 24 Hrs  T(C): 38 (12 Oct 2022 04:00), Max: 38 (12 Oct 2022 04:00)  T(F): 100.4 (12 Oct 2022 04:00), Max: 100.4 (12 Oct 2022 04:00)  HR: 56 (12 Oct 2022 08:12) (50 - 91)  BP: --  BP(mean): --  RR: 24 (12 Oct 2022 07:45) (11 - 30)  SpO2: 100% (12 Oct 2022 08:12) (95% - 100%)    Parameters below as of 12 Oct 2022 06:30  Patient On (Oxygen Delivery Method): ventilator          Physical Exam:   General: intubated  Neurology: somnolent, but able to follow simple commands  ENT/Neck: Neck supple, trachea midline, No JVD  Respiratory: rhonchi throughout    CV: S1S2, no murmurs        [x] Sinus Rhythm   Abdominal: Soft, NT, ND, colostomy in place  Extremities: +4 RUE edema, 3+LUE edema, 2 + pedal edema noted, + peripheral pulses   Skin: Dry dressing over right heel, no Rashes, Hematoma, Ecchymosis , R elbow wound w/ eschar                          Assessment:  71F PMH DM, COPD, Chronic Adrenal Insufficiency on Chronic prednisone, history of colorectal cancer s/p resection (colostomy bag), Hx of CAD, Chronic A fib on Eliquis, and tracheomalacia and multiple intubations, recent dx of OM presented to ED at [facility] this morning with epigastric pain, belching and central chest pain. Found on CTA to have type A aortic dissection and transferred to [facility] for surgical evaluation by Dr. Cabrera.     Ascending aortic dissection s/p type A dissection repair and biobentall on 9/7   Respiratory failure s/p Trach 10/10/22  Hypovolemia   Post op respiratory failure   Acute blood loss anemia  Stress hyperglycemia   Leukocytosis   RIJ thrombus  MRSA PNA        Plan:   ***Neuro***  Off sedation, follows simple commands, continue to monitor  PT/OT progress as tolerated    ***Cardiovascular***  Limited TTE on 10/1: EF 69%, Hyperdynamic left ventricular systolic function. There is hypokinesis of the mid-base inferior wall. Nml RV fxn.   CT chest on 9/28: No CT evidence of pulmonary embolism. Status post repair of ascending aortic dissection with a stable dissection flap originating from the aortic arch and extending into the infrarenal abdominal aorta,  B/l UE duplex on 10/5: As before, there is an occluded RIGHT IJ vein with thrombosis extending to brachiocephalic vein. Superficial thrombophlebitis of the LEFT cephalic vein. heparin drip held for OR today, will f/u H/H post PRBC and if stable with no signs of bleeding will resume heparin drip tonight  Invasive hemodynamic monitoring, assess perfusion indices   SR / MAP 61/ Hct 27.7/ Lactate 1.4  [x] Cardene 3mG/Hr  Rate control with Mexiletine Digoxin, and Lopressor   [x] AC Therapy with Heparin  [x] Volume: [x] Albumin 25%-100ml x1 [x] PRBC 1U yesterday  Reassessment of hemodynamics  Serial EKG and cardiac enzymes       ***Pulmonary***  Respiratory failure s/p Trach #8 portex  10/10/22, per ENT inner cannula needs to be changed daily  Post op vent management   Titration of FiO2 and PEEP, follow SpO2, CXR, blood gasses   Continue bronchodilators duoneb, pulmacort  plan for CPAP in the morning  TC trials yesterday       Mode: CPAP with PS  FiO2: 40  PEEP: 5  PS: 5  MAP: 12  PIP: 23              ***GI***  [x] Diet:  TFs, Glucerna 1.5 @ 55cc/hr   [x] Protonix       ***Renal***  Continue to monitor I/Os, BUN/Creatinine.   Replete lytes PRN  Seven present   Diuresis with Lasix d/c-> contraction alkalosis     ***ID***  SCx on 10/4+Methicillin resistant Staphylococcus aureus, c/w IVPB Vancomycin, next trough 4am 10/11 (plan for 6 week course in setting of valve surgery, ?11/8 end date)  9/27 blood culture Neela Glabarata, on flucanazole (lifelong suppression)  PO Vancomycin solution for C.diff prophylaxis   R elbow wound w/ eschar-> wound team evaluated, recommending ortho consult for further joint work up- xrays done today  Will re-consult wound care for further managment      ***Endocrine***  [x]  DM : HbA1c 9.4%                - [x] ISS  [x] NPH              - Need tight glycemic control to prevent wound infection.          Patient requires continuous monitoring with bedside rhythm monitoring, pulse oximetry monitoring, and continuous central venous and arterial pressure monitoring; and intermittent blood gas analysis. Care plan discussed with the ICU care team.   Patient remained critical, at risk for life threatening decompensation.    I have spent 30 minutes providing critical care management to this patient.    By signing my name below, I, Kieran Plascencia, attest that this documentation has been prepared under the direction and in the presence of Gena Meyer NP   Electronically signed: Georgi Cordero, 10-12-22 @ 08:29    I, Gena Meyer NP, personally performed the services described in this documentation. all medical record entries made by the marcyibe were at my direction and in my presence. I have reviewed the chart and agree that the record reflects my personal performance and is accurate and complete  Electronically signed: Gena Meyer NP  Patient seen and examined at the bedside.    Remained critically ill on continuous ICU monitoring.    24 hour events:  - Holding heparin for line changes   - ordered ultrasound for Elbow   - Attempted CPAP Trials         OBJECTIVE:  Vital Signs Last 24 Hrs  T(C): 38 (12 Oct 2022 04:00), Max: 38 (12 Oct 2022 04:00)  T(F): 100.4 (12 Oct 2022 04:00), Max: 100.4 (12 Oct 2022 04:00)  HR: 56 (12 Oct 2022 08:12) (50 - 91)  BP: --  BP(mean): --  RR: 24 (12 Oct 2022 07:45) (11 - 30)  SpO2: 100% (12 Oct 2022 08:12) (95% - 100%)    Parameters below as of 12 Oct 2022 06:30  Patient On (Oxygen Delivery Method): ventilator          Physical Exam:   General: intubated  Neurology: somnolent, but able to follow simple commands  ENT/Neck: Neck supple, trachea midline, No JVD  Respiratory: rhonchi throughout    CV: S1S2, no murmurs        [x] Sinus Rhythm   Abdominal: Soft, NT, ND, colostomy in place  Extremities: +4 RUE edema, 3+LUE edema, 2 + pedal edema noted, + peripheral pulses   Skin: Dry dressing over right heel, no Rashes, Hematoma, Ecchymosis , R elbow wound w/ eschar                          Assessment:  71F PMH DM, COPD, Chronic Adrenal Insufficiency on Chronic prednisone, history of colorectal cancer s/p resection (colostomy bag), Hx of CAD, Chronic A fib on Eliquis, and tracheomalacia and multiple intubations, recent dx of OM presented to ED at [facility] this morning with epigastric pain, belching and central chest pain. Found on CTA to have type A aortic dissection and transferred to [facility] for surgical evaluation by Dr. Cabrera.     Ascending aortic dissection s/p type A dissection repair and biobentall on 9/7   Respiratory failure s/p Trach 10/10/22  Hypovolemia   Post op respiratory failure   Acute blood loss anemia  Stress hyperglycemia   Leukocytosis   RIJ thrombus  MRSA PNA        Plan:   ***Neuro***  Off sedation, follows simple commands, continue to monitor  PT/OT progress as tolerated    ***Cardiovascular***  Limited TTE on 10/1: EF 69%, Hyperdynamic left ventricular systolic function. There is hypokinesis of the mid-base inferior wall. Nml RV fxn.   CT chest on 9/28: No CT evidence of pulmonary embolism. Status post repair of ascending aortic dissection with a stable dissection flap originating from the aortic arch and extending into the infrarenal abdominal aorta,  B/l UE duplex on 10/5: As before, there is an occluded RIGHT IJ vein with thrombosis extending to brachiocephalic vein. Superficial thrombophlebitis of the LEFT cephalic vein. heparin drip held for OR today, will f/u H/H post PRBC and if stable with no signs of bleeding will resume heparin drip tonight  Invasive hemodynamic monitoring, assess perfusion indices   SR / MAP 61/ Hct 27.7/ Lactate 1.4  [x] Cardene 3mG/Hr  Rate control with Mexiletine Digoxin, and Lopressor   [x] AC Therapy with Heparin Holding today for line changes  [x] Volume: [x] Albumin 25%-100ml x1 [x] PRBC 1U yesterday  Reassessment of hemodynamics  Serial EKG and cardiac enzymes       ***Pulmonary***  Respiratory failure s/p Trach #8 portex  10/10/22, per ENT inner cannula needs to be changed daily  Post op vent management   Titration of FiO2 and PEEP, follow SpO2, CXR, blood gasses   Continue bronchodilators duoneb, pulmacort  plan for CPAP in the morning  TC trials yesterday       Mode: CPAP with PS  FiO2: 40  PEEP: 5  PS: 5  MAP: 12  PIP: 23              ***GI***  [x] Diet:  TFs, Glucerna 1.5 @ 55cc/hr   [x] Protonix       ***Renal***  Continue to monitor I/Os, BUN/Creatinine.   Replete lytes PRN  Seven present   Diuresis with Lasix d/c-> contraction alkalosis     ***ID***  SCx on 10/4+Methicillin resistant Staphylococcus aureus, c/w IVPB Vancomycin, next trough 4am 10/11 (plan for 6 week course in setting of valve surgery, ?11/8 end date)  9/27 blood culture Neela Glabarata, on flucanazole (lifelong suppression)  PO Vancomycin solution for C.diff prophylaxis   R elbow wound w/ eschar-> wound team evaluated, recommending ortho consult for further joint work up- xrays done today  Will re-consult wound care for further managment      ***Endocrine***  [x]  DM : HbA1c 9.4%                - [x] ISS  [x] NPH              - Need tight glycemic control to prevent wound infection.          Patient requires continuous monitoring with bedside rhythm monitoring, pulse oximetry monitoring, and continuous central venous and arterial pressure monitoring; and intermittent blood gas analysis. Care plan discussed with the ICU care team.   Patient remained critical, at risk for life threatening decompensation.    I have spent 30 minutes providing critical care management to this patient.    By signing my name below, I, Kieran Plascencia, attest that this documentation has been prepared under the direction and in the presence of Gena Meyer NP   Electronically signed: Georgi Cordero, 10-12-22 @ 08:29    I, Gena Meyer NP, personally performed the services described in this documentation. all medical record entries made by the marcyibe were at my direction and in my presence. I have reviewed the chart and agree that the record reflects my personal performance and is accurate and complete  Electronically signed: Gena Meyer NP  Patient seen and examined at the bedside.    Remained critically ill on continuous ICU monitoring.    24 hour events:  - Holding heparin for line changes in setting of rising WBC/febrile overnight- Kevin x1 and ID re-consulted  - ordered ultrasound for Elbow to assess for fluid collection/abcess  -yesterday tolerated TC x8 hours, attempted TC/PS this AM however didn't tolerate 2/2 tachypnea        OBJECTIVE:  Vital Signs Last 24 Hrs  T(C): 38 (12 Oct 2022 04:00), Max: 38 (12 Oct 2022 04:00)  T(F): 100.4 (12 Oct 2022 04:00), Max: 100.4 (12 Oct 2022 04:00)  HR: 56 (12 Oct 2022 08:12) (50 - 91)  BP: --  BP(mean): --  RR: 24 (12 Oct 2022 07:45) (11 - 30)  SpO2: 100% (12 Oct 2022 08:12) (95% - 100%)    Parameters below as of 12 Oct 2022 06:30  Patient On (Oxygen Delivery Method): ventilator          Physical Exam:   General: intubated  Neurology: awake, minimally interactive, able to follow simple commands  ENT/Neck: Neck supple, trachea midline, No JVD  Respiratory: rhonchi throughout  +trach with minimal secretions  CV: S1S2, no murmurs        [x] Sinus Rhythm   Abdominal: Soft, NT, ND, colostomy in place  Extremities: +4 RUE edema, 3+LUE edema, 2 + pedal edema noted, + peripheral pulses   Skin: Dry dressing over right heel, no Rashes, Hematoma, Ecchymosis , R elbow wound w/ eschar and swelling                         Assessment:  71F PMH DM, COPD, Chronic Adrenal Insufficiency on Chronic prednisone, history of colorectal cancer s/p resection (colostomy bag), Hx of CAD, Chronic A fib on Eliquis, and tracheomalacia and multiple intubations, recent dx of OM presented to ED at [facility] this morning with epigastric pain, belching and central chest pain. Found on CTA to have type A aortic dissection and transferred to [facility] for surgical evaluation by Dr. Cabrera.     Ascending aortic dissection s/p type A dissection repair and biobentall on 9/7   Respiratory failure s/p Trach 10/10/22  Hypovolemia   Post op respiratory failure   Acute blood loss anemia  Stress hyperglycemia   Leukocytosis   RIJ thrombus  MRSA PNA        Plan:   ***Neuro***  Off sedation, follows simple commands, continue to monitor  anxious, started on low dose precedex to aid in vent synchrony  PT/OT progress as tolerated    ***Cardiovascular***  Limited TTE on 10/1: EF 69%, Hyperdynamic left ventricular systolic function. There is hypokinesis of the mid-base inferior wall. Nml RV fxn.   CT chest on 9/28: No CT evidence of pulmonary embolism. Status post repair of ascending aortic dissection with a stable dissection flap originating from the aortic arch and extending into the infrarenal abdominal aorta,  B/l UE duplex on 10/5: As before, there is an occluded RIGHT IJ vein with thrombosis extending to brachiocephalic vein. Superficial thrombophlebitis of the LEFT cephalic vein.  Invasive hemodynamic monitoring, assess perfusion indices   SR / MAP 61/ Hct 27.7/ Lactate 1.4  [x] Cardene 3mG/Hr will add hydralazine to help wean, HR 55-65 so unable to uptitrate lopressor  Rate control with Mexiletine Digoxin, and Lopressor   [x] AC Therapy with Heparin Holding today for line changes- will resume post  Reassessment of hemodynamics  Serial EKG and cardiac enzymes       ***Pulmonary***  Respiratory failure s/p Trach #8 portex  10/10/22, per ENT inner cannula needs to be changed daily, sutures can be DC day 7 (10/17)  Post op vent management   Titration of FiO2 and PEEP, follow SpO2, CXR, blood gasses   Continue bronchodilators duoneb, pulmacort  CPAP/TC trials as tolerated  this morning tachypnea with PS, high peak pressures on AC/ dyschronous with vent, low dose precedex started, and changed to PC ventilation with improved compliance      Mode: CPAP with PS  FiO2: 40  PEEP: 5  PS: 5  MAP: 12  PIP: 23              ***GI***  [x] Diet:  TFs, Glucerna 1.5 @ 55cc/hr   [x] Protonix       ***Renal***  Continue to monitor I/Os, BUN/Creatinine.   Replete lytes PRN  Seven present   Diuresis with Lasix d/c-> contraction alkalosis     ***ID***  SCx on 10/4+Methicillin resistant Staphylococcus aureus, c/w IVPB Vancomycin, next trough 4am 10/11 (plan for 6 week course in setting of valve surgery, ?11/8 end date)  9/27 blood culture Neela Glabarata, on flucanazole (lifelong suppression)  PO Vancomycin solution for C.diff prophylaxis   R elbow wound w/ eschar-> wound team evaluated, ortho signed off (neg xrays, not likely septic arthritis)  Wound re-consulted yesterday for further managment US soft tissue ordered today to r/o abcess/fluid collection   overnight febrile with rising WBC, will change central line, ID re-consulted and meropenum reinitiated. blood cultures sent, f/u results      ***Endocrine***  [x]  DM : HbA1c 9.4%                - [x] ISS  [x] NPH              - Need tight glycemic control to prevent wound infection.          Patient requires continuous monitoring with bedside rhythm monitoring, pulse oximetry monitoring, and continuous central venous and arterial pressure monitoring; and intermittent blood gas analysis. Care plan discussed with the ICU care team.   Patient remained critical, at risk for life threatening decompensation.    I have spent 45 minutes providing critical care management to this patient.    By signing my name below, I, Kieran Plascencia, attest that this documentation has been prepared under the direction and in the presence of Gena Meyer NP   Electronically signed: Georgi Cordero, 10-12-22 @ 08:29    I, Gena Meyer NP, personally performed the services described in this documentation. all medical record entries made by the scribe were at my direction and in my presence. I have reviewed the chart and agree that the record reflects my personal performance and is accurate and complete  Electronically signed: Gena Meyer NP

## 2022-10-12 NOTE — PROGRESS NOTE ADULT - ASSESSMENT
70yo Female s/p #8 Portex Cuffed Tracheostomy POD #2. Pt doing well on CPAP with no reported issues.

## 2022-10-12 NOTE — PROGRESS NOTE ADULT - SUBJECTIVE AND OBJECTIVE BOX
INFECTIOUS DISEASES FOLLOW UP-- Fouzia Marily  [phone]    This is a follow up note for this  72yFemale with  Dissection of thoracic aorta  developed recurrent fevers  lines changed  bronchoscopy at bedside and deep sputum sent for gram stain/cx        ROS:  CONSTITUTIONAL:  No fever, good appetite  CARDIOVASCULAR:  No chest pain or palpitations  RESPIRATORY:  No dyspnea  GASTROINTESTINAL:  No nausea, vomiting, diarrhea, or abdominal pain  GENITOURINARY:  No dysuria  NEUROLOGIC:  No headache,     Allergies    aspirin (Short breath)  Avelox (Short breath; Pruritus)  cefepime (Anaphylaxis)  codeine (Short breath)  Dilaudid (Short breath)  iodine (Short breath; Swelling)  penicillin (Anaphylaxis)  shellfish (Anaphylaxis)  tetanus toxoid (Short breath)  Valium (Short breath)    Intolerances        ANTIBIOTICS/RELEVANT:  antimicrobials  fluconAZOLE IVPB 600 milliGRAM(s) IV Intermittent every 24 hours  meropenem  IVPB 1000 milliGRAM(s) IV Intermittent every 8 hours  vancomycin    Solution 125 milliGRAM(s) Oral every 12 hours  vancomycin  IVPB 1000 milliGRAM(s) IV Intermittent every 12 hours    immunologic:    OTHER:  acetylcysteine 10%  Inhalation 4 milliLiter(s) Inhalation every 12 hours  albuterol/ipratropium for Nebulization 3 milliLiter(s) Nebulizer every 6 hours  buDESOnide    Inhalation Suspension 0.5 milliGRAM(s) Inhalation every 12 hours  chlorhexidine 0.12% Liquid 15 milliLiter(s) Oral Mucosa every 12 hours  chlorhexidine 2% Cloths 1 Application(s) Topical <User Schedule>  collagenase Ointment 1 Application(s) Topical daily  dextrose 50% Injectable 50 milliLiter(s) IV Push every 15 minutes  digoxin  Injectable 125 MICROGram(s) IV Push daily  heparin  Infusion 1050 Unit(s)/Hr IV Continuous <Continuous>  hydrALAZINE 10 milliGRAM(s) Oral every 8 hours  insulin lispro (ADMELOG) corrective regimen sliding scale   SubCutaneous every 6 hours  insulin NPH human recombinant 25 Unit(s) SubCutaneous every 6 hours  magnesium oxide 400 milliGRAM(s) Oral every 8 hours  methylPREDNISolone 8 milliGRAM(s) Oral daily  metoprolol tartrate 25 milliGRAM(s) Oral two times a day  mexiletine 200 milliGRAM(s) Oral every 8 hours  multivitamin 1 Tablet(s) Oral daily  niCARdipine Infusion 3 mG/Hr IV Continuous <Continuous>  pantoprazole  Injectable 40 milliGRAM(s) IV Push daily  propofol Infusion 20 MICROgram(s)/kG/Min IV Continuous <Continuous>  sodium chloride 0.9%. 1000 milliLiter(s) IV Continuous <Continuous>      Objective:  Vital Signs Last 24 Hrs  T(C): 37.1 (12 Oct 2022 16:00), Max: 38 (12 Oct 2022 04:00)  T(F): 98.8 (12 Oct 2022 16:00), Max: 100.4 (12 Oct 2022 04:00)  HR: 84 (12 Oct 2022 18:00) (50 - 86)  BP: --  BP(mean): --  RR: 16 (12 Oct 2022 18:00) (11 - 36)  SpO2: 100% (12 Oct 2022 18:00) (90% - 100%)    Parameters below as of 12 Oct 2022 13:15  Patient On (Oxygen Delivery Method): ventilator    O2 Concentration (%): 40    PHYSICAL EXAM:  Constitutional:no acute distress  Eyes:EBER, EOMI  Ear/Nose/Throat: no oral lesions, 	  Respiratory: clear BL  Cardiovascular: S1S2  Gastrointestinal:soft, (+) BS, no tenderness  Extremities:no e/e/c  No Lymphadenopathy  IV sites not inflammed.    LABS:                        8.2    18.87 )-----------( 311      ( 12 Oct 2022 00:34 )             27.7     10-12    137  |  100  |  20  ----------------------------<  292<H>  5.3   |  27  |  0.48<L>    Ca    9.2      12 Oct 2022 13:17  Phos  2.2     10-12  Mg     1.7     10-12    TPro  7.3  /  Alb  3.8  /  TBili  0.6  /  DBili  x   /  AST  54<H>  /  ALT  85<H>  /  AlkPhos  215<H>  10-12    PT/INR - ( 11 Oct 2022 03:01 )   PT: 12.9 sec;   INR: 1.12 ratio         PTT - ( 12 Oct 2022 03:45 )  PTT:56.1 sec      MICROBIOLOGY:            RECENT CULTURES:  10-08 @ 01:17  .Blood Blood-Peripheral  --  --  --    No growth to date.  --  10-08 @ 00:50  .Blood Blood-Peripheral  --  --  --    No growth to date.  --  10-07 @ 00:58  .Blood Blood-Peripheral  --  --  --    No Growth Final  --      RADIOLOGY & ADDITIONAL STUDIES:

## 2022-10-12 NOTE — CONSULT NOTE ADULT - SUBJECTIVE AND OBJECTIVE BOX
Interventional Radiology  Evaluate for Procedure: right elbow fluid collection aspiration    HPI: 72y Female with concerns for septic arthritis  on right elbow; US demonstrating superficial fluid collection; IR consulted for aspiration.    Allergies: aspirin (Short breath)  Avelox (Short breath; Pruritus)  cefepime (Anaphylaxis)  codeine (Short breath)  Dilaudid (Short breath)  iodine (Short breath; Swelling)  penicillin (Anaphylaxis)  tetanus toxoid (Short breath)  Valium (Short breath)    Medications (Abx/Cardiac/Anticoagulation/Blood Products)    digoxin  Injectable: 125 MICROGram(s) IV Push (10-12 @ 11:39)  fluconAZOLE IVPB: 150 mL/Hr IV Intermittent (10-11 @ 22:07)  furosemide    Tablet: 20 milliGRAM(s) Oral (10-11 @ 05:53)  heparin  Infusion: 10.5 mL/Hr IV Continuous (10-11 @ 01:30)  labetalol Injectable: 5 milliGRAM(s) IV Push (10-12 @ 00:47)  meropenem  IVPB: 100 mL/Hr IV Intermittent (10-12 @ 06:27)  meropenem  IVPB: 100 mL/Hr IV Intermittent (10-12 @ 15:05)  metoprolol tartrate: 25 milliGRAM(s) Oral (10-12 @ 06:20)  mexiletine: 200 milliGRAM(s) Oral (10-12 @ 15:06)  niCARdipine Infusion: 15 mL/Hr IV Continuous (10-11 @ 21:19)  vancomycin    Solution: 125 milliGRAM(s) Oral (10-12 @ 17:27)  vancomycin  IVPB: 250 mL/Hr IV Intermittent (10-12 @ 17:27)    Data:  T(C): 37.9  HR: 55  BP: --  RR: 26  SpO2: 97%    -WBC 18.87 / HgB 8.2 / Hct 27.7 / Plt 311  -Na 137 / Cl 100 / BUN 20 / Glucose 292  -K 5.3 / CO2 27 / Cr 0.48  -ALT 85 / Alk Phos 215 / T.Bili 0.6  -INR -- / PTT 56.1    Radiology: reviewed    Assessment/Plan:  72y Female with concerns for septic arthritis  on right elbow; US demonstrating superficial fluid collection; IR consulted for aspiration.    - case reviewed and approved for tomorrow  - please place IR procedure order under NP Tate  - STAT labs in AM (cbc,coags, bmp, T&S)  - hold heparin gtt on call to IR  - does not need to be NPO  - COVID PCR results within 5 days of planned procedure  - d/w primary team    Ronda Tate NP  Available on Teams

## 2022-10-12 NOTE — PROCEDURE NOTE - NSBRONCHPROCDETAILS_GEN_A_CORE_FT
Indication: increased secretions, hypoxic, low tidal volumes and elevated peak pressures, febrile and rising WBC c/f PNA/mucous plug    Pre-op Dx: Type A dissection      Findings:  Bronchoscope inserted through ETT. ETT noted to be in good position. Airway evaluation revealed Sharp Nichole. SHOSHANA and LLL evaluation revealed thin secretions. RUL, RML, RLL revealed erythemous and copious secretions. Bronchoscope then withdrawn from ETT.     Specimens: BAL collected and sent

## 2022-10-12 NOTE — PROGRESS NOTE ADULT - ASSESSMENT
71 year-old female with a history of DM2, CAD, A-Fib on apixaban, Severe Persistent Asthma (on chronic steroids, recently started on Tezspire), colon cancer s/p resection/chemo, and tracheobronchomalacia s/p tracheoplasty, and recent OM of the R foot s/b debridement and completed course of Vancomycin/Ertapenem for MRSA/ESBL Proteus/Corynebacterium who now presents with chest pain, found to have Type A dissection s/p repair with modified Bentall procedure and hemiarch replacement on 22. Post-op course complicated by acute hypoxemic respiratory failure, shock, anemia, and hyperglycemia requiring insulin gtt. Extubated , now awake and alert with mild encephalopathy but overall significantly improved.    Assessment:  Acute hypoxemic respiratory failure requiring intubation  Type A Aortic Dissection  Severe Persistent Asthma  History of Tracheobronchomalacia    Plan:  See below:  -**************************                                SEE Below:  -improving but weakness  9/15-ABX adjusted to ceftriaxone (LFTS)-PICU  -PICU, low grade temp-fever work up in progress, no resp decline or sx  -resp stable at present but tenuous  -for FEESST today, NGT in place w/TF  -s/p FEESST-passed, remains in PICU          -TF in place at 40cc, more OOB          -hypoglycemia noted and rx, no change in resp status  -vented/sedated-pressors/inotropes/ABX  -remains vented on nitrous/less O2 needs, improving LFTS                   -vented/semi sedated--less O2 needs  10/3-on vanco, weaning sedation/, all lines changed  10/6-no changes-now afebrile-on vanco   10/7-no weaning-remains off pressors  10/10-for trach, no weaning done               10/11-s/p trach-uneventful  10/12-TC and ;cpap done and tolerated well

## 2022-10-12 NOTE — PROGRESS NOTE ADULT - SUBJECTIVE AND OBJECTIVE BOX
CHIEF COMPLAINT: f/up sob, chronic resp failure, TBM, severe persistent asthma, VC dysfunction, Type A aortic dissection s/p repair w/modified "Bentall procedure and hemiarch replacement 9/6-vented and conversent-fatigued and weak    Interval Events: ;TC and cpap done 10/11    REVIEW OF SYSTEMS:  Constitutional: No fevers or chills. No weight loss. + fatigue or generalized malaise.  Eyes: No itching or discharge from the eyes  ENT: No ear pain. No ear discharge. No nasal congestion. No post nasal drip. No epistaxis. No throat pain. No sore throat. No difficulty swallowing.   CV: No chest pain. No palpitations. No lightheadedness or dizziness.   Resp: No dyspnea at rest. No dyspnea on exertion. No orthopnea. No wheezing. No cough. No stridor. No sputum production. No chest pain with respiration.  GI: No nausea. No vomiting. No diarrhea.  MSK: No joint pain or pain in any extremities  Integumentary: No skin lesions. No pedal edema.  Neurological: + gross motor weakness. No sensory changes.  [+] All other systems negative  [ ] Unable to assess ROS because ________    OBJECTIVE:  ICU Vital Signs Last 24 Hrs  T(C): 38 (12 Oct 2022 04:00), Max: 38 (12 Oct 2022 04:00)  T(F): 100.4 (12 Oct 2022 04:00), Max: 100.4 (12 Oct 2022 04:00)  HR: 64 (12 Oct 2022 04:00) (56 - 97)  BP: --  BP(mean): --  ABP: 152/40 (12 Oct 2022 04:00) (133/37 - 197/79)  ABP(mean): 67 (12 Oct 2022 04:00) (61 - 122)  RR: 18 (12 Oct 2022 04:00) (11 - 26)  SpO2: 96% (12 Oct 2022 04:00) (95% - 100%)    O2 Parameters below as of 12 Oct 2022 04:00  Patient On (Oxygen Delivery Method): ventilator    O2 Concentration (%): 40      Mode: AC/ CMV (Assist Control/ Continuous Mandatory Ventilation), RR (machine): 16, TV (machine): 450, FiO2: 40, PEEP: 5, ITime: 1, MAP: 12, PIP: 21    10-10 @ 07:01  -  10-11 @ 07:00  --------------------------------------------------------  IN: 2899.9 mL / OUT: 4645 mL / NET: -1745.1 mL    10-11 @ 07:01  -  10-12 @ 05:09  --------------------------------------------------------  IN: 3042.5 mL / OUT: 2230 mL / NET: 812.5 mL      CAPILLARY BLOOD GLUCOSE  246 (11 Oct 2022 12:00)      POCT Blood Glucose.: 123 mg/dL (12 Oct 2022 00:09)      PHYSICAL EXAM: NAD in bed on vent  General: Awake, alert, oriented X 3.   HEENT: Atraumatic, normocephalic.                 Mallampatti Grade 3                No nasal congestion.                No tonsillar or pharyngeal exudates.  Lymph Nodes: No palpable lymphadenopathy  Neck: No JVD. No carotid bruit.   Respiratory: Normal chest expansion                         Normal percussion                         Normal and equal air entry                         No wheeze, rhonchi or rales.  Cardiovascular: S1 S2 normal. No murmurs, rubs or gallops.   Abdomen: Soft, non-tender, non-distended. No organomegaly. Normoactive bowel sounds.  Extremities: Warm to touch. Peripheral pulse palpable. + pedal edema.   Skin: No rashes or skin lesions  Neurological: Motor and sensory examination equal and normal in all four extremities.  Psychiatry: Appropriate mood and affect.    HOSPITAL MEDICATIONS:  MEDICATIONS  (STANDING):  albuterol/ipratropium for Nebulization 3 milliLiter(s) Nebulizer every 6 hours  buDESOnide    Inhalation Suspension 0.5 milliGRAM(s) Inhalation every 12 hours  chlorhexidine 0.12% Liquid 15 milliLiter(s) Oral Mucosa every 12 hours  chlorhexidine 2% Cloths 1 Application(s) Topical <User Schedule>  collagenase Ointment 1 Application(s) Topical daily  dextrose 50% Injectable 50 milliLiter(s) IV Push every 15 minutes  digoxin  Injectable 125 MICROGram(s) IV Push daily  fluconAZOLE IVPB 600 milliGRAM(s) IV Intermittent every 24 hours  heparin  Infusion 1050 Unit(s)/Hr (12.5 mL/Hr) IV Continuous <Continuous>  insulin lispro (ADMELOG) corrective regimen sliding scale   SubCutaneous every 6 hours  insulin NPH human recombinant 22 Unit(s) SubCutaneous every 6 hours  magnesium oxide 400 milliGRAM(s) Oral every 8 hours  methylPREDNISolone 8 milliGRAM(s) Oral daily  metoprolol tartrate 25 milliGRAM(s) Oral two times a day  mexiletine 200 milliGRAM(s) Oral every 8 hours  multivitamin 1 Tablet(s) Oral daily  niCARdipine Infusion 3 mG/Hr (15 mL/Hr) IV Continuous <Continuous>  pantoprazole  Injectable 40 milliGRAM(s) IV Push daily  sodium chloride 0.9%. 1000 milliLiter(s) (10 mL/Hr) IV Continuous <Continuous>  vancomycin    Solution 125 milliGRAM(s) Oral every 12 hours  vancomycin  IVPB 1000 milliGRAM(s) IV Intermittent every 12 hours    MEDICATIONS  (PRN):      LABS:                        8.2    18.87 )-----------( 311      ( 12 Oct 2022 00:34 )             27.7     10-12    141  |  105  |  11  ----------------------------<  124<H>  3.6   |  26  |  0.37<L>    Ca    8.5      12 Oct 2022 00:34  Phos  2.2     10-12  Mg     1.7     10-12    TPro  6.4  /  Alb  3.3  /  TBili  0.5  /  DBili  x   /  AST  47<H>  /  ALT  66<H>  /  AlkPhos  171<H>  10-12    PT/INR - ( 11 Oct 2022 03:01 )   PT: 12.9 sec;   INR: 1.12 ratio         PTT - ( 12 Oct 2022 03:45 )  PTT:56.1 sec    Arterial Blood Gas:  10-12 @ 00:11  7.45/44/134/31/98.7/6.0  ABG lactate: --  Arterial Blood Gas:  10-11 @ 21:11  7.46/45/146/32/99.7/7.4  ABG lactate: --  Arterial Blood Gas:  10-11 @ 17:45  7.41/47/117/30/98.3/4.6  ABG lactate: --  Arterial Blood Gas:  10-11 @ 14:06  7.47/43/94/31/97.5/7.0  ABG lactate: --  Arterial Blood Gas:  10-11 @ 10:25  7.50/39/172/30/99.4/6.7  ABG lactate: --  Arterial Blood Gas:  10-11 @ 00:00  7.50/36/173/28/98.3/4.7  ABG lactate: --  Arterial Blood Gas:  10-10 @ 21:55  7.45/40/117/28/99.2/3.5  ABG lactate: --  Arterial Blood Gas:  10-10 @ 16:47  7.45/43/94/30/98.6/5.4  ABG lactate: --  Arterial Blood Gas:  10-10 @ 08:23  7.43/45/162/30/98.5/5.0  ABG lactate: --        MICROBIOLOGY:     RADIOLOGY:  [ ] Reviewed and interpreted by me    Point of Care Ultrasound Findings:    PFT:    EKG:

## 2022-10-12 NOTE — PROGRESS NOTE ADULT - ASSESSMENT
71F w/h/o uncontrolled T2DM (A1C 9.4%) on basal/bolus insulin PTA. Unknown DM complications. Also COPD, secondary adrenal Insufficiency on chronic steroids, colorectal cancer s/p resection (colostomy bag), Chronic A fib on Eliquis, and tracheomalacia and multiple intubations, recent dx of OM presented to ED at [facility] with epigastric pain, belching and central chest pain. Found on CTA to have type A aortic dissection and transferred to [facility] for surgical evaluation by Dr. Cabrera. Now s/p aortic dissection repair on 9/07/22. Endocrine consulted for assistance with uncontrolled DM/steroids for AI. Pt in ICU with ventricular dysfunction/sepsis, and now s/p trach 10/10, off pressors and tolerating TFs with BG above goal while on present insulin doses. Will continue to increase NPH insulin to BG goal 100 [id number].   Pt remains with leukocytosis and improving transaminitis. Back on Prednisone dose 8mg      Type 2 diabetes mellitus with hyperglycemia.   ·  Plan:  - no issues with TF.  TF remains at goal   - BG are not at goal    -test BG q6h if NPO/TF   - Increased NPH dose to 25 units q 6h while on TFs. HOLD IF TFS ARE OFF.  -C/w Admelog moderate correction scale q6h  Discharge plan:  - Likely to discharge patient home on basal/bolus insulin. Final regimen pending clinical course.  - Recommend routine outpatient ophthalmology, podiatry  - Can f/u with endocrinologist Dr. Saavedra.  - Make sure pt has Rx for all DM supplies and insulin/ DM meds.  -Based on pt's clinical condition and mental status at this time she is not able to independently manage her DM. Will evaluate pt's ability to manage DM at time of discharge. If unable> pt will need family/ care giver (s) to manage DM care at home  -Will need rehab.     Problem/Plan - 2:  ·  Problem: Adrenal insufficiency.   ·  Plan: On chronic steroids at home: medrol 8mg qd   Completed stress steroid doses/taper  Re started Prednisone 8mg qd 10/8  Will follow up pt status.     Problem/Plan - 3:  ·  Problem: Hyperlipidemia.   ·  Plan: Off rosuvastatin 5mg daily  Re start if not contraindicated and as per cardiology  -F/u levels as out pt.

## 2022-10-13 LAB
ALBUMIN SERPL ELPH-MCNC: 3.3 G/DL — SIGNIFICANT CHANGE UP (ref 3.3–5)
ALP SERPL-CCNC: 187 U/L — HIGH (ref 40–120)
ALT FLD-CCNC: 68 U/L — HIGH (ref 10–45)
ANION GAP SERPL CALC-SCNC: 11 MMOL/L — SIGNIFICANT CHANGE UP (ref 5–17)
APTT BLD: 37.2 SEC — HIGH (ref 27.5–35.5)
APTT BLD: 37.2 SEC — HIGH (ref 27.5–35.5)
APTT BLD: 49.2 SEC — HIGH (ref 27.5–35.5)
APTT BLD: 58.3 SEC — HIGH (ref 27.5–35.5)
AST SERPL-CCNC: 38 U/L — SIGNIFICANT CHANGE UP (ref 10–40)
BILIRUB SERPL-MCNC: 0.7 MG/DL — SIGNIFICANT CHANGE UP (ref 0.2–1.2)
BUN SERPL-MCNC: 18 MG/DL — SIGNIFICANT CHANGE UP (ref 7–23)
CALCIUM SERPL-MCNC: 9.3 MG/DL — SIGNIFICANT CHANGE UP (ref 8.4–10.5)
CHLORIDE SERPL-SCNC: 104 MMOL/L — SIGNIFICANT CHANGE UP (ref 96–108)
CO2 SERPL-SCNC: 24 MMOL/L — SIGNIFICANT CHANGE UP (ref 22–31)
CREAT SERPL-MCNC: 0.47 MG/DL — LOW (ref 0.5–1.3)
CULTURE RESULTS: SIGNIFICANT CHANGE UP
CULTURE RESULTS: SIGNIFICANT CHANGE UP
EGFR: 100 ML/MIN/1.73M2 — SIGNIFICANT CHANGE UP
GAS PNL BLDA: SIGNIFICANT CHANGE UP
GLUCOSE BLDC GLUCOMTR-MCNC: 120 MG/DL — HIGH (ref 70–99)
GLUCOSE BLDC GLUCOMTR-MCNC: 134 MG/DL — HIGH (ref 70–99)
GLUCOSE BLDC GLUCOMTR-MCNC: 176 MG/DL — HIGH (ref 70–99)
GLUCOSE SERPL-MCNC: 143 MG/DL — HIGH (ref 70–99)
GRAM STN FLD: SIGNIFICANT CHANGE UP
HCT VFR BLD CALC: 20.7 % — CRITICAL LOW (ref 34.5–45)
HCT VFR BLD CALC: 24.5 % — LOW (ref 34.5–45)
HGB BLD-MCNC: 6.3 G/DL — CRITICAL LOW (ref 11.5–15.5)
HGB BLD-MCNC: 7.4 G/DL — LOW (ref 11.5–15.5)
INR BLD: 1.26 RATIO — HIGH (ref 0.88–1.16)
INR BLD: 2.1 RATIO — HIGH (ref 0.88–1.16)
MAGNESIUM SERPL-MCNC: 1.9 MG/DL — SIGNIFICANT CHANGE UP (ref 1.6–2.6)
MCHC RBC-ENTMCNC: 23.9 PG — LOW (ref 27–34)
MCHC RBC-ENTMCNC: 24.3 PG — LOW (ref 27–34)
MCHC RBC-ENTMCNC: 30.2 GM/DL — LOW (ref 32–36)
MCHC RBC-ENTMCNC: 30.4 GM/DL — LOW (ref 32–36)
MCV RBC AUTO: 79.3 FL — LOW (ref 80–100)
MCV RBC AUTO: 79.9 FL — LOW (ref 80–100)
NRBC # BLD: 2 /100 WBCS — HIGH (ref 0–0)
NRBC # BLD: 2 /100 WBCS — HIGH (ref 0–0)
PHOSPHATE SERPL-MCNC: 0.8 MG/DL — CRITICAL LOW (ref 2.5–4.5)
PHOSPHATE SERPL-MCNC: 2.2 MG/DL — LOW (ref 2.5–4.5)
PLATELET # BLD AUTO: 235 K/UL — SIGNIFICANT CHANGE UP (ref 150–400)
PLATELET # BLD AUTO: 275 K/UL — SIGNIFICANT CHANGE UP (ref 150–400)
POTASSIUM SERPL-MCNC: 4.4 MMOL/L — SIGNIFICANT CHANGE UP (ref 3.5–5.3)
POTASSIUM SERPL-SCNC: 4.4 MMOL/L — SIGNIFICANT CHANGE UP (ref 3.5–5.3)
PROT SERPL-MCNC: 6.8 G/DL — SIGNIFICANT CHANGE UP (ref 6–8.3)
PROTHROM AB SERPL-ACNC: 14.5 SEC — HIGH (ref 10.5–13.4)
PROTHROM AB SERPL-ACNC: 24.3 SEC — HIGH (ref 10.5–13.4)
RBC # BLD: 2.59 M/UL — LOW (ref 3.8–5.2)
RBC # BLD: 3.09 M/UL — LOW (ref 3.8–5.2)
RBC # FLD: 25.5 % — HIGH (ref 10.3–14.5)
RBC # FLD: 25.7 % — HIGH (ref 10.3–14.5)
SODIUM SERPL-SCNC: 139 MMOL/L — SIGNIFICANT CHANGE UP (ref 135–145)
SPECIMEN SOURCE: SIGNIFICANT CHANGE UP
WBC # BLD: 14.05 K/UL — HIGH (ref 3.8–10.5)
WBC # BLD: 15.04 K/UL — HIGH (ref 3.8–10.5)
WBC # FLD AUTO: 14.05 K/UL — HIGH (ref 3.8–10.5)
WBC # FLD AUTO: 15.04 K/UL — HIGH (ref 3.8–10.5)

## 2022-10-13 PROCEDURE — 99232 SBSQ HOSP IP/OBS MODERATE 35: CPT

## 2022-10-13 PROCEDURE — 36620 INSERTION CATHETER ARTERY: CPT

## 2022-10-13 PROCEDURE — 99291 CRITICAL CARE FIRST HOUR: CPT | Mod: 25

## 2022-10-13 PROCEDURE — 71045 X-RAY EXAM CHEST 1 VIEW: CPT | Mod: 26,76

## 2022-10-13 PROCEDURE — 76942 ECHO GUIDE FOR BIOPSY: CPT | Mod: 26

## 2022-10-13 PROCEDURE — 10160 PNXR ASPIR ABSC HMTMA BULLA: CPT

## 2022-10-13 PROCEDURE — 99024 POSTOP FOLLOW-UP VISIT: CPT

## 2022-10-13 RX ORDER — ACETAMINOPHEN 500 MG
650 TABLET ORAL EVERY 6 HOURS
Refills: 0 | Status: COMPLETED | OUTPATIENT
Start: 2022-10-13 | End: 2023-09-11

## 2022-10-13 RX ORDER — HYDRALAZINE HCL 50 MG
10 TABLET ORAL ONCE
Refills: 0 | Status: COMPLETED | OUTPATIENT
Start: 2022-10-13 | End: 2022-10-13

## 2022-10-13 RX ORDER — POTASSIUM CHLORIDE 20 MEQ
40 PACKET (EA) ORAL ONCE
Refills: 0 | Status: COMPLETED | OUTPATIENT
Start: 2022-10-13 | End: 2022-10-13

## 2022-10-13 RX ORDER — HYDRALAZINE HCL 50 MG
25 TABLET ORAL EVERY 8 HOURS
Refills: 0 | Status: DISCONTINUED | OUTPATIENT
Start: 2022-10-13 | End: 2022-10-15

## 2022-10-13 RX ORDER — MAGNESIUM SULFATE 500 MG/ML
2 VIAL (ML) INJECTION ONCE
Refills: 0 | Status: COMPLETED | OUTPATIENT
Start: 2022-10-13 | End: 2022-10-13

## 2022-10-13 RX ORDER — FUROSEMIDE 40 MG
20 TABLET ORAL EVERY 12 HOURS
Refills: 0 | Status: DISCONTINUED | OUTPATIENT
Start: 2022-10-13 | End: 2022-10-18

## 2022-10-13 RX ADMIN — Medication 0.5 MILLIGRAM(S): at 05:58

## 2022-10-13 RX ADMIN — HUMAN INSULIN 25 UNIT(S): 100 INJECTION, SUSPENSION SUBCUTANEOUS at 18:15

## 2022-10-13 RX ADMIN — PANTOPRAZOLE SODIUM 40 MILLIGRAM(S): 20 TABLET, DELAYED RELEASE ORAL at 12:41

## 2022-10-13 RX ADMIN — CHLORHEXIDINE GLUCONATE 1 APPLICATION(S): 213 SOLUTION TOPICAL at 05:16

## 2022-10-13 RX ADMIN — MAGNESIUM OXIDE 400 MG ORAL TABLET 400 MILLIGRAM(S): 241.3 TABLET ORAL at 13:19

## 2022-10-13 RX ADMIN — MEROPENEM 100 MILLIGRAM(S): 1 INJECTION INTRAVENOUS at 21:33

## 2022-10-13 RX ADMIN — Medication 125 MILLIGRAM(S): at 18:16

## 2022-10-13 RX ADMIN — Medication 63.75 MILLIMOLE(S): at 08:08

## 2022-10-13 RX ADMIN — Medication 1 TABLET(S): at 12:41

## 2022-10-13 RX ADMIN — Medication 125 MILLIGRAM(S): at 05:03

## 2022-10-13 RX ADMIN — Medication 3 MILLILITER(S): at 00:44

## 2022-10-13 RX ADMIN — MEXILETINE HYDROCHLORIDE 200 MILLIGRAM(S): 150 CAPSULE ORAL at 13:23

## 2022-10-13 RX ADMIN — HEPARIN SODIUM 13.5 UNIT(S)/HR: 5000 INJECTION INTRAVENOUS; SUBCUTANEOUS at 12:40

## 2022-10-13 RX ADMIN — Medication 20 MILLIGRAM(S): at 03:48

## 2022-10-13 RX ADMIN — Medication 3 MILLILITER(S): at 17:41

## 2022-10-13 RX ADMIN — MEXILETINE HYDROCHLORIDE 200 MILLIGRAM(S): 150 CAPSULE ORAL at 21:32

## 2022-10-13 RX ADMIN — Medication 2: at 13:17

## 2022-10-13 RX ADMIN — Medication 3 MILLILITER(S): at 05:58

## 2022-10-13 RX ADMIN — Medication 4 MILLILITER(S): at 17:42

## 2022-10-13 RX ADMIN — Medication 3 MILLILITER(S): at 12:55

## 2022-10-13 RX ADMIN — MEXILETINE HYDROCHLORIDE 200 MILLIGRAM(S): 150 CAPSULE ORAL at 05:18

## 2022-10-13 RX ADMIN — Medication 8 MILLIGRAM(S): at 05:18

## 2022-10-13 RX ADMIN — HUMAN INSULIN 25 UNIT(S): 100 INJECTION, SUSPENSION SUBCUTANEOUS at 06:54

## 2022-10-13 RX ADMIN — Medication 20 MILLIGRAM(S): at 17:27

## 2022-10-13 RX ADMIN — CHLORHEXIDINE GLUCONATE 15 MILLILITER(S): 213 SOLUTION TOPICAL at 17:25

## 2022-10-13 RX ADMIN — MEROPENEM 100 MILLIGRAM(S): 1 INJECTION INTRAVENOUS at 13:18

## 2022-10-13 RX ADMIN — MAGNESIUM OXIDE 400 MG ORAL TABLET 400 MILLIGRAM(S): 241.3 TABLET ORAL at 05:03

## 2022-10-13 RX ADMIN — HUMAN INSULIN 25 UNIT(S): 100 INJECTION, SUSPENSION SUBCUTANEOUS at 13:20

## 2022-10-13 RX ADMIN — Medication 125 MICROGRAM(S): at 12:41

## 2022-10-13 RX ADMIN — Medication 0.5 MILLIGRAM(S): at 17:42

## 2022-10-13 RX ADMIN — Medication 10 MILLIGRAM(S): at 22:37

## 2022-10-13 RX ADMIN — MEROPENEM 100 MILLIGRAM(S): 1 INJECTION INTRAVENOUS at 05:03

## 2022-10-13 RX ADMIN — Medication 10 MILLIGRAM(S): at 13:19

## 2022-10-13 RX ADMIN — Medication 250 MILLIGRAM(S): at 17:42

## 2022-10-13 RX ADMIN — Medication 250 MILLIGRAM(S): at 06:56

## 2022-10-13 RX ADMIN — CHLORHEXIDINE GLUCONATE 15 MILLILITER(S): 213 SOLUTION TOPICAL at 05:05

## 2022-10-13 RX ADMIN — FLUCONAZOLE 150 MILLIGRAM(S): 150 TABLET ORAL at 20:51

## 2022-10-13 RX ADMIN — Medication 40 MILLIEQUIVALENT(S): at 03:48

## 2022-10-13 RX ADMIN — MAGNESIUM OXIDE 400 MG ORAL TABLET 400 MILLIGRAM(S): 241.3 TABLET ORAL at 23:29

## 2022-10-13 RX ADMIN — Medication 25 MILLIGRAM(S): at 17:26

## 2022-10-13 RX ADMIN — HEPARIN SODIUM 13.5 UNIT(S)/HR: 5000 INJECTION INTRAVENOUS; SUBCUTANEOUS at 22:36

## 2022-10-13 RX ADMIN — Medication 1 APPLICATION(S): at 13:04

## 2022-10-13 RX ADMIN — Medication 25 GRAM(S): at 05:18

## 2022-10-13 RX ADMIN — Medication 25 MILLIGRAM(S): at 05:03

## 2022-10-13 RX ADMIN — Medication 10 MILLIGRAM(S): at 21:17

## 2022-10-13 RX ADMIN — HEPARIN SODIUM 13.5 UNIT(S)/HR: 5000 INJECTION INTRAVENOUS; SUBCUTANEOUS at 20:51

## 2022-10-13 RX ADMIN — Medication 4 MILLILITER(S): at 05:58

## 2022-10-13 RX ADMIN — Medication 10 MILLIGRAM(S): at 05:03

## 2022-10-13 RX ADMIN — Medication 25 MILLIGRAM(S): at 21:29

## 2022-10-13 RX ADMIN — HUMAN INSULIN 25 UNIT(S): 100 INJECTION, SUSPENSION SUBCUTANEOUS at 00:36

## 2022-10-13 NOTE — PROGRESS NOTE ADULT - TIME BILLING
as above: weaning continues (TC/cpap able) s/p  trach 10/10--remains in resp failure-sepsis ccufutwohl-EJKP-ygiuyrk vented/ABX-stable CV status  multifactorial dyspnea-resp failure-severe persistent asthma, TBM s/p tracheoplasty, s/p Aortic aneurysm repair, Bronchitis (proteus)-O2-keep 90%; wean as able off sedation    ****MOVE to advance TC if able in full awake status  severe persistent asthma--medrol 8mg, singulair 10, duoneb q 6, budes .5 bid, tezspire 8/29-next 9/29  TBM-s/p tracheoplasty--accapella, unable to use vest due to surgery  s/p Aortic aneurysm repair--as per [facility] staff care  AF-on heparin--eventual eliquis rx               CV-improved cardene-MAP above 60  DVT R-IJ-on heparin rx  ID-s/p proteus bronchitis-s/p meropenum changed to ceftriaxone and back to meropenem/vanco- off of ABX as of 9/19--restart of ABX 9/27-meropenem/vanco-NOW on meropenem and vanco for MRSE sepsis and fluconazole as per ID  PT-OOB as able                             GI-TF as able--f/up LFTs       VC dysfunction--aspiration precautions-s/p trach 10/10  GOC                                    Heme onc f/up colon ca  prog--critical in PICU                PT-to restart as improved    Eulalio Allison MD-Pulmonary   [phone]

## 2022-10-13 NOTE — PROGRESS NOTE ADULT - SUBJECTIVE AND OBJECTIVE BOX
CHIEF COMPLAINT: f/up sob, chronic resp failure, TBM, severe persistent asthma, VC dysfunction, Type A aortic dissection s/p repair w/modified "Bentall procedure and hemiarch replacement 9/6-vented -awake and alert, no complaints    Interval Events: TC weaning in progress    REVIEW OF SYSTEMS:  Constitutional: No fevers or chills. No weight loss. No fatigue or generalized malaise.  Eyes: No itching or discharge from the eyes  ENT: No ear pain. No ear discharge. No nasal congestion. No post nasal drip. No epistaxis. No throat pain. No sore throat. No difficulty swallowing.   CV: No chest pain. No palpitations. No lightheadedness or dizziness.   Resp: No dyspnea at rest. No dyspnea on exertion. No orthopnea. No wheezing. No cough. No stridor. No sputum production. No chest pain with respiration.  GI: No nausea. No vomiting. No diarrhea.  MSK: No joint pain or pain in any extremities  Integumentary: No skin lesions. No pedal edema.  Neurological: +gross motor weakness. No sensory changes.  + ] All other systems negative  [ ] Unable to assess ROS because ________    OBJECTIVE:  ICU Vital Signs Last 24 Hrs  T(C): 37.1 (13 Oct 2022 00:00), Max: 37.9 (12 Oct 2022 08:00)  T(F): 98.8 (13 Oct 2022 00:00), Max: 100.2 (12 Oct 2022 08:00)  HR: 79 (13 Oct 2022 04:00) (50 - 90)  BP: --  BP(mean): --  ABP: 123/52 (13 Oct 2022 04:00) (102/38 - 168/46)  ABP(mean): 77 (13 Oct 2022 04:00) (0 - 80)  RR: 15 (13 Oct 2022 04:00) (10 - 36)  SpO2: 98% (13 Oct 2022 04:00) (90% - 100%)    O2 Parameters below as of 13 Oct 2022 04:00  Patient On (Oxygen Delivery Method): ventilator    O2 Concentration (%): 50      Mode: AC/ CMV (Assist Control/ Continuous Mandatory Ventilation), RR (machine): 16, FiO2: 40, PEEP: 5, ITime: 1, MAP: 14, PC: 20, PIP: 26    10-11 @ 07:01  -  10-12 @ 07:00  --------------------------------------------------------  IN: 3820 mL / OUT: 2540 mL / NET: 1280 mL    10-12 @ 07:01  -  10-13 @ 04:54  --------------------------------------------------------  IN: 2151.3 mL / OUT: 940 mL / NET: 1211.3 mL      CAPILLARY BLOOD GLUCOSE  246 (11 Oct 2022 12:00)      POCT Blood Glucose.: 137 mg/dL (12 Oct 2022 23:48)      PHYSICAL EXAM: NAD in bed on vent  General: Awake, alert, oriented X 3.   HEENT: Atraumatic, normocephalic.                 Mallampatti Grade 3                No nasal congestion.                No tonsillar or pharyngeal exudates.  Lymph Nodes: No palpable lymphadenopathy  Neck: No JVD. No carotid bruit.   Respiratory: Normal chest expansion                         Normal percussion                         Normal and equal air entry                         No wheeze, rhonchi or rales.  Cardiovascular: S1 S2 normal. No murmurs, rubs or gallops.   Abdomen: Soft, non-tender, non-distended. No organomegaly. Normoactive bowel sounds.  Extremities: Warm to touch. Peripheral pulse palpable. + pedal edema.   Skin: No rashes or skin lesions  Neurological: Motor and sensory examination equal and abnormal in all four extremities.  Psychiatry: Appropriate mood and affect.    HOSPITAL MEDICATIONS:  MEDICATIONS  (STANDING):  acetylcysteine 10%  Inhalation 4 milliLiter(s) Inhalation every 12 hours  albuterol/ipratropium for Nebulization 3 milliLiter(s) Nebulizer every 6 hours  buDESOnide    Inhalation Suspension 0.5 milliGRAM(s) Inhalation every 12 hours  chlorhexidine 0.12% Liquid 15 milliLiter(s) Oral Mucosa every 12 hours  chlorhexidine 2% Cloths 1 Application(s) Topical <User Schedule>  collagenase Ointment 1 Application(s) Topical daily  dextrose 50% Injectable 50 milliLiter(s) IV Push every 15 minutes  digoxin  Injectable 125 MICROGram(s) IV Push daily  fluconAZOLE IVPB 600 milliGRAM(s) IV Intermittent every 24 hours  furosemide   Injectable 20 milliGRAM(s) IV Push every 12 hours  heparin  Infusion 1050 Unit(s)/Hr (13.5 mL/Hr) IV Continuous <Continuous>  hydrALAZINE 10 milliGRAM(s) Oral every 8 hours  insulin lispro (ADMELOG) corrective regimen sliding scale   SubCutaneous every 6 hours  insulin NPH human recombinant 25 Unit(s) SubCutaneous every 6 hours  magnesium oxide 400 milliGRAM(s) Oral every 8 hours  meropenem  IVPB 1000 milliGRAM(s) IV Intermittent every 8 hours  methylPREDNISolone 8 milliGRAM(s) Oral daily  metoprolol tartrate 25 milliGRAM(s) Oral two times a day  mexiletine 200 milliGRAM(s) Oral every 8 hours  multivitamin 1 Tablet(s) Oral daily  niCARdipine Infusion 3 mG/Hr (15 mL/Hr) IV Continuous <Continuous>  pantoprazole  Injectable 40 milliGRAM(s) IV Push daily  sodium chloride 0.9%. 1000 milliLiter(s) (10 mL/Hr) IV Continuous <Continuous>  vancomycin    Solution 125 milliGRAM(s) Oral every 12 hours  vancomycin  IVPB 1000 milliGRAM(s) IV Intermittent every 12 hours    MEDICATIONS  (PRN):  acetaminophen    Suspension .. 650 milliGRAM(s) Oral every 6 hours PRN Mild Pain (1 - 3)      LABS:                        7.4    15.04 )-----------( 275      ( 13 Oct 2022 03:12 )             24.5     10-12    137  |  100  |  20  ----------------------------<  292<H>  5.3   |  27  |  0.48<L>    Ca    9.2      12 Oct 2022 13:17  Phos  0.8     10-13  Mg     1.7     10-12    TPro  7.3  /  Alb  3.8  /  TBili  0.6  /  DBili  x   /  AST  54<H>  /  ALT  85<H>  /  AlkPhos  215<H>  10-12    PT/INR - ( 13 Oct 2022 03:12 )   PT: 14.5 sec;   INR: 1.26 ratio         PTT - ( 13 Oct 2022 03:12 )  PTT:37.2 sec    Arterial Blood Gas:  10-13 @ 02:20  7.56/25/129/22/98.7/0.4  ABG lactate: --  Arterial Blood Gas:  10-12 @ 12:35  7.39/47/121/28/98.1/3.1  ABG lactate: --  Arterial Blood Gas:  10-12 @ 00:11  7.45/44/134/31/98.7/6.0  ABG lactate: --  Arterial Blood Gas:  10-11 @ 21:11  7.46/45/146/32/99.7/7.4  ABG lactate: --  Arterial Blood Gas:  10-11 @ 17:45  7.41/47/117/30/98.3/4.6  ABG lactate: --  Arterial Blood Gas:  10-11 @ 14:06  7.47/43/94/31/97.5/7.0  ABG lactate: --  Arterial Blood Gas:  10-11 @ 10:25  7.50/39/172/30/99.4/6.7  ABG lactate: --        MICROBIOLOGY:     RADIOLOGY:  [ ] Reviewed and interpreted by me    Point of Care Ultrasound Findings:    PFT:    EKG:

## 2022-10-13 NOTE — PROGRESS NOTE ADULT - PROBLEM SELECTOR PLAN 1
·  Plan: - Keep the Trach site clean and dry.   - Remove Sutures on POD #7.   - Keep the Omniflex for a week to avoid the manipulation of the stoma.   - Elevate HOB.   - Gentle suction prn.   - ENT will continue to follow.

## 2022-10-13 NOTE — PROCEDURE NOTE - PROCEDURE FINDINGS AND DETAILS
technically successful fluoroscopic guided R chest port removal. incision was made at the neck site as well, as the catheter could not be fully removed from the port pocket. after a 2nd incision was made, the full catheter was successfully removed, as confirmed on final image. the catheter and port were sent for culture. the skin was closed with sutures at the chest port site and the neck incision with a bandage applied.
20cc sanguinous fluid aspirated from loculated right elbow fluid collection with decreased size of collection.     collection appears loculated and despite fully aspirating, no additional fluid able to be expressed with small residual collection.    sent for cultures

## 2022-10-13 NOTE — PROGRESS NOTE ADULT - SUBJECTIVE AND OBJECTIVE BOX
Patient seen and examined at the bedside.    Remained critically ill on continuous ICU monitoring.    OBJECTIVE:  Vital Signs Last 24 Hrs  T(C): 36.9 (13 Oct 2022 09:00), Max: 37.2 (12 Oct 2022 20:00)  T(F): 98.4 (13 Oct 2022 09:00), Max: 99 (12 Oct 2022 20:00)  HR: 83 (13 Oct 2022 10:00) (52 - 90)  BP: --  BP(mean): --  RR: 22 (13 Oct 2022 10:00) (10 - 36)  SpO2: 99% (13 Oct 2022 10:00) (90% - 100%)    Parameters below as of 13 Oct 2022 09:00  Patient On (Oxygen Delivery Method): BiPAP/CPAP          Physical Exam:   General: intubated  Neurology: awake, minimally interactive, able to follow simple commands  ENT/Neck: Neck supple, trachea midline, No JVD  Respiratory: rhonchi throughout  +trach with minimal secretions  CV: S1S2, no murmurs        [x] Sinus Rhythm   Abdominal: Soft, NT, ND, colostomy in place  Extremities: +4 RUE edema, 3+LUE edema, 2 + pedal edema noted, + peripheral pulses   Skin: Dry dressing over right heel, no Rashes, Hematoma, Ecchymosis , R elbow wound w/ eschar and swelling                        Assessment:  71F PMH DM, COPD, Chronic Adrenal Insufficiency on Chronic prednisone, history of colorectal cancer s/p resection (colostomy bag), Hx of CAD, Chronic A fib on Eliquis, and tracheomalacia and multiple intubations, recent dx of OM presented to ED at [facility] this morning with epigastric pain, belching and central chest pain. Found on CTA to have type A aortic dissection and transferred to [facility] for surgical evaluation by Dr. Cabrera.     Ascending aortic dissection s/p type A dissection repair and biobentall on 9/7   Respiratory failure s/p Trach 10/10/22  Hypovolemia   Post op respiratory failure   Acute blood loss anemia  Stress hyperglycemia   Leukocytosis   RIJ thrombus  MRSA PNA          Plan:   ***Neuro***  Off sedation, follows simple commands, continue to monitor  PT/OT progress as tolerated      ***Cardiovascular***  ***Cardiovascular***  Limited TTE on 10/1: EF 69%, Hyperdynamic left ventricular systolic function. There is hypokinesis of the mid-base inferior wall. Nml RV fxn.   CT chest on 9/28: No CT evidence of pulmonary embolism. Status post repair of ascending aortic dissection with a stable dissection flap originating from the aortic arch and extending into the infrarenal abdominal aorta,  B/l UE duplex on 10/5: As before, there is an occluded RIGHT IJ vein with thrombosis extending to brachiocephalic vein. Superficial thrombophlebitis of the LEFT cephalic vein.  Invasive hemodynamic monitoring, assess perfusion indices   SR / CVP 6/ MAP 81/ Hct 24.5/ Lactate 2.1  [x] Cardene 3mG/Hr added hydralazine to help wean, HR 55-65 so unable to uptitrate lopressor  Rate control with Mexiletine Digoxin, and Lopressor   [x] AC Therapy with Heparin   Reassessment of hemodynamics  Serial EKG and cardiac enzymes       ***Pulmonary***  Respiratory failure s/p Trach #8 portex  10/10/22, per ENT inner cannula needs to be changed daily, sutures can be DC day 7 (10/17)  Post op vent management   Titration of FiO2 and PEEP, follow SpO2, CXR, blood gasses   Continue bronchodilators duoneb, pulmacort  CPAP/TC trials as tolerated  CPAP this morning, no weaning done yesterday   yesterday morning tachypnea with PS, high peak pressures on AC/ dyschronous with vent, low dose precedex started (now d/c), and changed to PC ventilation with improved compliance        Mode: CPAP with PS  FiO2: 40  PEEP: 5  MAP: 9  PIP: 21              ***GI***  [x] Diet:  TFs, Glucerna 1.5 @ 55cc/hr   [x] Protonix         ***Renal***  Continue to monitor I/Os, BUN/Creatinine.   Replete lytes PRN  Seven present   Diuresis with Lasix       ***ID***  SCx on 10/4+Methicillin resistant Staphylococcus aureus, c/w IVPB Vancomycin, next trough 4am 10/11 (plan for 6 week course in setting of valve surgery, ?11/8 end date)  9/27 blood culture Neela Glabarata, on flucanazole (lifelong suppression)  PO Vancomycin solution for C.diff prophylaxis   R elbow wound w/ eschar-> wound team evaluated, ortho signed off (neg xrays, not likely septic arthritis)  Wound re-consulted yesterday for further managment US soft tissue ordered yesterday to r/o abcess/fluid collection--> Will go down to IR for elbow drainage today    overnight febrile with rising WBC, will change central line, ID re-consulted and meropenum reinitiated. blood cultures sent, f/u results    ***Endocrine***  [x]  DM : HbA1c 9.4%                - [x] ISS  [x] NPH              - Need tight glycemic control to prevent wound infection.        Patient requires continuous monitoring with bedside rhythm monitoring, pulse oximetry monitoring, and continuous central venous and arterial pressure monitoring; and intermittent blood gas analysis. Care plan discussed with the ICU care team.   Patient remained critical, at risk for life threatening decompensation.    I have spent 30 minutes providing critical care management to this patient.    By signing my name below, I, Kieran Plascencia, attest that this documentation has been prepared under the direction and in the presence of Simone Garcia NP   Electronically signed: Rogers Cordero, 10-13-22 @ 10:38    EVERETTE, Simone Garcia NP, personally performed the services described in this documentation. all medical record entries made by the rogers were at my direction and in my presence. I have reviewed the chart and agree that the record reflects my personal performance and is accurate and complete  Electronically signed: Simone Garcia NP  Patient seen and examined at the bedside.    Remained critically ill on continuous ICU monitoring.    OBJECTIVE:  Vital Signs Last 24 Hrs  T(C): 36.9 (13 Oct 2022 09:00), Max: 37.2 (12 Oct 2022 20:00)  T(F): 98.4 (13 Oct 2022 09:00), Max: 99 (12 Oct 2022 20:00)  HR: 83 (13 Oct 2022 10:00) (52 - 90)  BP: --  BP(mean): --  RR: 22 (13 Oct 2022 10:00) (10 - 36)  SpO2: 99% (13 Oct 2022 10:00) (90% - 100%)    Parameters below as of 13 Oct 2022 09:00  Patient On (Oxygen Delivery Method): BiPAP/CPAP          Physical Exam:   General: trached  Neurology: awake, minimally interactive, able to follow simple commands  ENT/Neck: Neck supple, trachea midline, No JVD  Respiratory: rhonchi throughout  +trach with minimal secretions  CV: S1S2, no murmurs        [x] Sinus Rhythm   Abdominal: Soft, NT, ND, colostomy in place  Extremities: +4 RUE edema, 3+LUE edema, 2 + pedal edema noted, + peripheral pulses   Skin: Dry dressing over right heel, no Rashes, Hematoma, Ecchymosis , R elbow wound w/ eschar and swelling                        Assessment:  71F PMH DM, COPD, Chronic Adrenal Insufficiency on Chronic prednisone, history of colorectal cancer s/p resection (colostomy bag), Hx of CAD, Chronic A fib on Eliquis, and tracheomalacia and multiple intubations, recent dx of OM presented to ED at [facility] this morning with epigastric pain, belching and central chest pain. Found on CTA to have type A aortic dissection and transferred to [facility] for surgical evaluation by Dr. Cabrera.     Ascending aortic dissection s/p type A dissection repair and biobentall on 9/7   Respiratory failure s/p Trach 10/10/22  Hypovolemia   Post op respiratory failure   Acute blood loss anemia  Stress hyperglycemia   Leukocytosis   RIJ thrombus  MRSA PNA          Plan:   ***Neuro***  Off sedation, follows simple commands, continue to monitor  PT/OT progress as tolerated      ***Cardiovascular***  Limited TTE on 10/1: EF 69%, Hyperdynamic left ventricular systolic function. There is hypokinesis of the mid-base inferior wall. Nml RV fxn.   CT chest on 9/28: No CT evidence of pulmonary embolism. Status post repair of ascending aortic dissection with a stable dissection flap originating from the aortic arch and extending into the infrarenal abdominal aorta,  B/l UE duplex on 10/5: As before, there is an occluded RIGHT IJ vein with thrombosis extending to brachiocephalic vein. Superficial thrombophlebitis of the LEFT cephalic vein.  Invasive hemodynamic monitoring, assess perfusion indices   SR / CVP 6/ MAP 81/ Hct 24.5/ Lactate 2.1  [x] hydralazine PO for BP managment, HR 55-65 so unable to uptitrate lopressor  Rate control with Mexiletine Digoxin, and Lopressor   [x] AC Therapy with Heparin   Reassessment of hemodynamics  Serial EKG and cardiac enzymes       ***Pulmonary***  Respiratory failure s/p Trach #8 portex  10/10/22, per ENT inner cannula needs to be changed daily, sutures can be DC day 7 (10/17)  Post op vent management   Titration of FiO2 and PEEP, follow SpO2, CXR, blood gasses   Continue bronchodilators duoneb, pulmacort  yesterday morning tachypnea with PS, high peak pressures on AC/ dyschronous with vent, low dose precedex started (now d/c), and changed to PC ventilation with improved compliance-> bronch BAL pending  Unable to tolerated vent weaning yesterday  CPAP/TC trials as tolerated today        Mode: CPAP with PS  FiO2: 40  PEEP: 5  MAP: 9  PIP: 21              ***GI***  [x] Diet:  TFs, Glucerna 1.5 @ 55cc/hr   [x] Protonix         ***Renal***  Continue to monitor I/Os, BUN/Creatinine.   Replete lytes PRN  Seven present   Diuresis with Lasix       ***ID***  SCx on 10/4+Methicillin resistant Staphylococcus aureus, c/w IVPB Vancomycin, next trough 4am 10/11 (plan for 6 week course in setting of valve surgery, ?11/8 end date)  9/27 blood culture Neela Glabarata, on flucanazole (lifelong suppression)  PO Vancomycin solution for C.diff prophylaxis   R elbow wound w/ eschar-> wound team evaluated, ortho signed off (neg xrays, not likely septic arthritis)  10/12 with rising WBC, central line changed, ID re-consulted and meropenum reinitiated. blood cultures sent, f/u results  Wound re-consulted yesterday for further managment US soft tissue ordered yesterday to r/o abcess/fluid collection--> Will go down to IR for elbow drainage today    Will follow up ID recs/ cultures      ***Endocrine***  [x]  DM : HbA1c 9.4%                - [x] ISS  [x] NPH              - Need tight glycemic control to prevent wound infection.        Patient requires continuous monitoring with bedside rhythm monitoring, pulse oximetry monitoring, and continuous central venous and arterial pressure monitoring; and intermittent blood gas analysis. Care plan discussed with the ICU care team.   Patient remained critical, at risk for life threatening decompensation.    I have spent 40 minutes providing critical care management to this patient.    By signing my name below, I, Kieran Plascencia, attest that this documentation has been prepared under the direction and in the presence of Simone Garcia NP   Electronically signed: Rogers Cordero, 10-13-22 @ 10:38    I, Simone Garcia NP, personally performed the services described in this documentation. all medical record entries made by the rogers were at my direction and in my presence. I have reviewed the chart and agree that the record reflects my personal performance and is accurate and complete  Electronically signed: Simone Garcia NP

## 2022-10-13 NOTE — PROGRESS NOTE ADULT - SUBJECTIVE AND OBJECTIVE BOX
ENT ISSUE/POD: S/P #8 Portex  Cuffed Tracheostomy POD # 3      HPI: 72yo Female s/p #8 Portex Cuffed Tracheostomy POD #3. Pt seen and examined at bedside. No acute events overnight. Doing well on CPAP with no reported issues.         PAST MEDICAL & SURGICAL HISTORY:  Atrial fibrillation  paroxysmal, on eliquis      Diabetes  Type 2      COPD (chronic obstructive pulmonary disease)      Adrenal insufficiency  Medrol daily for over 50 years      Aortic insufficiency  moderate AR on echo 5/3/2018      Pelvic fracture      Asthma      Tracheobronchomalacia  diagnosed 2015, s/p bronchial thermoplasty 2016 (Dr Zapien); recent bronchoscopy 6/5/2018 revealed no evidence of tracheobronchomalacia in trachea or bronchial tubes      Colorectal cancer  4/2018- last treatment , chemo and radiation      Rectal bleeding      Seizure  x 1 1/7/18      DVT (deep venous thrombosis)  15-20 years ago, took coumadin      TIA (transient ischemic attack)  multiple, last 5 years ago - presents as right-sided weakness      History of partial hysterectomy  30 years ago - fibroids      H/O total knee replacement, bilateral  5 years ago      History of sinus surgery  multiple sinus surgeries      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      H/O pelvic surgery  5 years ago - s/p fracture      History of tracheomalacia  2015 - attempted tracheal stenting ([facility])- course complicated by obstruction, respiratory failure, multiple CPR attempts -  stent discontinued; 10/20/2016 Tracheobronchoplasty (Prolene Mesh) performed at [facility] by Dr Zapien      S/P bronchoscopy  6/5/2018 - [place] (Dr Zapien) no evidence of tracheobronchomalacia in trachea or bronchial tubes      Rectal bleeding  exam under anesthesia ([facility]) 2/2018        Allergies    aspirin (Short breath)  Avelox (Short breath; Pruritus)  cefepime (Anaphylaxis)  codeine (Short breath)  Dilaudid (Short breath)  iodine (Short breath; Swelling)  penicillin (Anaphylaxis)  shellfish (Anaphylaxis)  tetanus toxoid (Short breath)  Valium (Short breath)    Intolerances      MEDICATIONS  (STANDING):  acetylcysteine 10%  Inhalation 4 milliLiter(s) Inhalation every 12 hours  albuterol/ipratropium for Nebulization 3 milliLiter(s) Nebulizer every 6 hours  buDESOnide    Inhalation Suspension 0.5 milliGRAM(s) Inhalation every 12 hours  chlorhexidine 0.12% Liquid 15 milliLiter(s) Oral Mucosa every 12 hours  chlorhexidine 2% Cloths 1 Application(s) Topical <User Schedule>  collagenase Ointment 1 Application(s) Topical daily  dextrose 50% Injectable 50 milliLiter(s) IV Push every 15 minutes  digoxin  Injectable 125 MICROGram(s) IV Push daily  fluconAZOLE IVPB 600 milliGRAM(s) IV Intermittent every 24 hours  furosemide   Injectable 20 milliGRAM(s) IV Push every 12 hours  heparin  Infusion 1050 Unit(s)/Hr (13.5 mL/Hr) IV Continuous <Continuous>  hydrALAZINE 10 milliGRAM(s) Oral every 8 hours  insulin lispro (ADMELOG) corrective regimen sliding scale   SubCutaneous every 6 hours  insulin NPH human recombinant 25 Unit(s) SubCutaneous every 6 hours  magnesium oxide 400 milliGRAM(s) Oral every 8 hours  meropenem  IVPB 1000 milliGRAM(s) IV Intermittent every 8 hours  methylPREDNISolone 8 milliGRAM(s) Oral daily  metoprolol tartrate 25 milliGRAM(s) Oral two times a day  mexiletine 200 milliGRAM(s) Oral every 8 hours  multivitamin 1 Tablet(s) Oral daily  niCARdipine Infusion 3 mG/Hr (15 mL/Hr) IV Continuous <Continuous>  pantoprazole  Injectable 40 milliGRAM(s) IV Push daily  sodium chloride 0.9%. 1000 milliLiter(s) (10 mL/Hr) IV Continuous <Continuous>  vancomycin    Solution 125 milliGRAM(s) Oral every 12 hours  vancomycin  IVPB 1000 milliGRAM(s) IV Intermittent every 12 hours    MEDICATIONS  (PRN):  acetaminophen    Suspension .. 650 milliGRAM(s) Oral every 6 hours PRN Mild Pain (1 - 3)      Social History: see consult    Family history: see consult    ROS:   ENT: all negative except as noted in HPI   Pulm: denies SOB, cough, hemoptysis  Neuro: denies numbness/tingling, loss of sensation  Endo: denies heat/cold intolerance, excessive sweating      Vital Signs Last 24 Hrs  T(C): 36.9 (13 Oct 2022 08:00), Max: 37.2 (12 Oct 2022 20:00)  T(F): 98.4 (13 Oct 2022 08:00), Max: 99 (12 Oct 2022 20:00)  HR: 76 (13 Oct 2022 08:35) (52 - 90)  BP: --  BP(mean): --  RR: 22 (13 Oct 2022 08:35) (10 - 36)  SpO2: 99% (13 Oct 2022 08:35) (90% - 100%)    Parameters below as of 13 Oct 2022 08:35  Patient On (Oxygen Delivery Method): BiPAP/CPAP,15,5    O2 Concentration (%): 40                          7.4    15.04 )-----------( 275      ( 13 Oct 2022 03:12 )             24.5    10-13    139  |  104  |  18  ----------------------------<  143<H>  4.4   |  24  |  0.47<L>    Ca    9.3      13 Oct 2022 04:39  Phos  2.2     10-13  Mg     1.9     10-13    TPro  6.8  /  Alb  3.3  /  TBili  0.7  /  DBili  x   /  AST  38  /  ALT  68<H>  /  AlkPhos  187<H>  10-13   PT/INR - ( 13 Oct 2022 03:12 )   PT: 14.5 sec;   INR: 1.26 ratio         PTT - ( 13 Oct 2022 03:12 )  PTT:37.2 sec    PHYSICAL EXAM:  Gen: NAD  Skin: No rashes, bruises, or lesions  Head: Normocephalic, Atraumatic  Face  no erythema, or fluctuance. Parotid glands soft without mass  Eyes: no scleral injection  Nose: Nares bilaterally patent, no discharge  Mouth: No Stridor / Drooling / Trismus.  Mucosa moist, tongue/uvula midline, oropharynx clear  Neck: Portex #8 cuffed trach in place, velcro tie in place  Flat, supple, no lymphadenopathy, trachea midline, no masses  Lymphatic: No lymphadenopathy  Resp: tolerating CPAP  Neuro: facial nerve intact, no facial droop

## 2022-10-13 NOTE — PROGRESS NOTE ADULT - ASSESSMENT
70yo Female s/p #8 Portex Cuffed Tracheostomy POD #3. Pt doing well on CPAP with no reported issues.

## 2022-10-13 NOTE — PROGRESS NOTE ADULT - ASSESSMENT
71 year-old female with a history of DM2, CAD, A-Fib on apixaban, Severe Persistent Asthma (on chronic steroids, recently started on Tezspire), colon cancer s/p resection/chemo, and tracheobronchomalacia s/p tracheoplasty, and recent OM of the R foot s/b debridement and completed course of Vancomycin/Ertapenem for MRSA/ESBL Proteus/Corynebacterium who now presents with chest pain, found to have Type A dissection s/p repair with modified Bentall procedure and hemiarch replacement on 22. Post-op course complicated by acute hypoxemic respiratory failure, shock, anemia, and hyperglycemia requiring insulin gtt. Extubated , now awake and alert with mild encephalopathy but overall significantly improved.    Assessment:  Acute hypoxemic respiratory failure requiring intubation  Type A Aortic Dissection  Severe Persistent Asthma  History of Tracheobronchomalacia    Plan:  See below:  -**************************                                SEE Below:  -improving but weakness  9/15-ABX adjusted to ceftriaxone (LFTS)-PICU  -PICU, low grade temp-fever work up in progress, no resp decline or sx  -resp stable at present but tenuous  -for FEESST today, NGT in place w/TF  -s/p FEESST-passed, remains in PICU          -TF in place at 40cc, more OOB          -hypoglycemia noted and rx, no change in resp status  -vented/sedated-pressors/inotropes/ABX  -remains vented on nitrous/less O2 needs, improving LFTS                   -vented/semi sedated--less O2 needs  10/3-on vanco, weaning sedation/, all lines changed  10/6-no changes-now afebrile-on vanco   10/7-no weaning-remains off pressors  10/10-for trach, no weaning done               10/11-s/p trach-uneventful  10/12-TC and ;cpap done and tolerated well  10/13-weaning TC continues

## 2022-10-14 LAB
ALBUMIN SERPL ELPH-MCNC: 3.5 G/DL — SIGNIFICANT CHANGE UP (ref 3.3–5)
ALP SERPL-CCNC: 172 U/L — HIGH (ref 40–120)
ALT FLD-CCNC: 61 U/L — HIGH (ref 10–45)
ANION GAP SERPL CALC-SCNC: 11 MMOL/L — SIGNIFICANT CHANGE UP (ref 5–17)
APTT BLD: 47.3 SEC — HIGH (ref 27.5–35.5)
APTT BLD: 64.7 SEC — HIGH (ref 27.5–35.5)
APTT BLD: 64.7 SEC — HIGH (ref 27.5–35.5)
AST SERPL-CCNC: 27 U/L — SIGNIFICANT CHANGE UP (ref 10–40)
BILIRUB SERPL-MCNC: 0.5 MG/DL — SIGNIFICANT CHANGE UP (ref 0.2–1.2)
BLD GP AB SCN SERPL QL: NEGATIVE — SIGNIFICANT CHANGE UP
BUN SERPL-MCNC: 14 MG/DL — SIGNIFICANT CHANGE UP (ref 7–23)
CALCIUM SERPL-MCNC: 8.9 MG/DL — SIGNIFICANT CHANGE UP (ref 8.4–10.5)
CHLORIDE SERPL-SCNC: 102 MMOL/L — SIGNIFICANT CHANGE UP (ref 96–108)
CO2 SERPL-SCNC: 26 MMOL/L — SIGNIFICANT CHANGE UP (ref 22–31)
CREAT SERPL-MCNC: 0.44 MG/DL — LOW (ref 0.5–1.3)
CULTURE RESULTS: SIGNIFICANT CHANGE UP
EGFR: 101 ML/MIN/1.73M2 — SIGNIFICANT CHANGE UP
GAS PNL BLDA: SIGNIFICANT CHANGE UP
GLUCOSE BLDC GLUCOMTR-MCNC: 103 MG/DL — HIGH (ref 70–99)
GLUCOSE BLDC GLUCOMTR-MCNC: 112 MG/DL — HIGH (ref 70–99)
GLUCOSE BLDC GLUCOMTR-MCNC: 118 MG/DL — HIGH (ref 70–99)
GLUCOSE BLDC GLUCOMTR-MCNC: 126 MG/DL — HIGH (ref 70–99)
GLUCOSE BLDC GLUCOMTR-MCNC: 212 MG/DL — HIGH (ref 70–99)
GLUCOSE BLDC GLUCOMTR-MCNC: 60 MG/DL — LOW (ref 70–99)
GLUCOSE BLDC GLUCOMTR-MCNC: 73 MG/DL — SIGNIFICANT CHANGE UP (ref 70–99)
GLUCOSE BLDC GLUCOMTR-MCNC: 74 MG/DL — SIGNIFICANT CHANGE UP (ref 70–99)
GLUCOSE BLDC GLUCOMTR-MCNC: 76 MG/DL — SIGNIFICANT CHANGE UP (ref 70–99)
GLUCOSE SERPL-MCNC: 123 MG/DL — HIGH (ref 70–99)
HCT VFR BLD CALC: 25.9 % — LOW (ref 34.5–45)
HGB BLD-MCNC: 7.8 G/DL — LOW (ref 11.5–15.5)
MAGNESIUM SERPL-MCNC: 1.8 MG/DL — SIGNIFICANT CHANGE UP (ref 1.6–2.6)
MCHC RBC-ENTMCNC: 23.9 PG — LOW (ref 27–34)
MCHC RBC-ENTMCNC: 30.1 GM/DL — LOW (ref 32–36)
MCV RBC AUTO: 79.2 FL — LOW (ref 80–100)
NRBC # BLD: 3 /100 WBCS — HIGH (ref 0–0)
PHOSPHATE SERPL-MCNC: 3.1 MG/DL — SIGNIFICANT CHANGE UP (ref 2.5–4.5)
PLATELET # BLD AUTO: 289 K/UL — SIGNIFICANT CHANGE UP (ref 150–400)
POTASSIUM SERPL-MCNC: 3.9 MMOL/L — SIGNIFICANT CHANGE UP (ref 3.5–5.3)
POTASSIUM SERPL-SCNC: 3.9 MMOL/L — SIGNIFICANT CHANGE UP (ref 3.5–5.3)
PROT SERPL-MCNC: 6.6 G/DL — SIGNIFICANT CHANGE UP (ref 6–8.3)
RBC # BLD: 3.27 M/UL — LOW (ref 3.8–5.2)
RBC # FLD: 25.1 % — HIGH (ref 10.3–14.5)
RH IG SCN BLD-IMP: POSITIVE — SIGNIFICANT CHANGE UP
SODIUM SERPL-SCNC: 139 MMOL/L — SIGNIFICANT CHANGE UP (ref 135–145)
SPECIMEN SOURCE: SIGNIFICANT CHANGE UP
UFH PPP CHRO-ACNC: 0.3 IU/ML — SIGNIFICANT CHANGE UP (ref 0.3–0.7)
VANCOMYCIN TROUGH SERPL-MCNC: 15.4 UG/ML — SIGNIFICANT CHANGE UP (ref 10–20)
VANCOMYCIN TROUGH SERPL-MCNC: 19.4 UG/ML — SIGNIFICANT CHANGE UP (ref 10–20)
WBC # BLD: 14.71 K/UL — HIGH (ref 3.8–10.5)
WBC # FLD AUTO: 14.71 K/UL — HIGH (ref 3.8–10.5)

## 2022-10-14 PROCEDURE — 99232 SBSQ HOSP IP/OBS MODERATE 35: CPT

## 2022-10-14 PROCEDURE — 99024 POSTOP FOLLOW-UP VISIT: CPT

## 2022-10-14 PROCEDURE — 99291 CRITICAL CARE FIRST HOUR: CPT | Mod: 24

## 2022-10-14 RX ORDER — HYDRALAZINE HCL 50 MG
10 TABLET ORAL ONCE
Refills: 0 | Status: COMPLETED | OUTPATIENT
Start: 2022-10-14 | End: 2022-10-14

## 2022-10-14 RX ORDER — VANCOMYCIN HCL 1 G
VIAL (EA) INTRAVENOUS
Refills: 0 | Status: DISCONTINUED | OUTPATIENT
Start: 2022-10-14 | End: 2022-11-16

## 2022-10-14 RX ORDER — HUMAN INSULIN 100 [IU]/ML
22 INJECTION, SUSPENSION SUBCUTANEOUS EVERY 6 HOURS
Refills: 0 | Status: DISCONTINUED | OUTPATIENT
Start: 2022-10-14 | End: 2022-10-17

## 2022-10-14 RX ORDER — VANCOMYCIN HCL 1 G
750 VIAL (EA) INTRAVENOUS EVERY 12 HOURS
Refills: 0 | Status: DISCONTINUED | OUTPATIENT
Start: 2022-10-15 | End: 2022-11-16

## 2022-10-14 RX ORDER — ACETAZOLAMIDE 250 MG/1
500 TABLET ORAL ONCE
Refills: 0 | Status: COMPLETED | OUTPATIENT
Start: 2022-10-14 | End: 2022-10-14

## 2022-10-14 RX ORDER — VANCOMYCIN HCL 1 G
750 VIAL (EA) INTRAVENOUS ONCE
Refills: 0 | Status: COMPLETED | OUTPATIENT
Start: 2022-10-14 | End: 2022-10-14

## 2022-10-14 RX ORDER — HYDROMORPHONE HYDROCHLORIDE 2 MG/ML
0.5 INJECTION INTRAMUSCULAR; INTRAVENOUS; SUBCUTANEOUS ONCE
Refills: 0 | Status: DISCONTINUED | OUTPATIENT
Start: 2022-10-14 | End: 2022-10-14

## 2022-10-14 RX ORDER — MAGNESIUM SULFATE 500 MG/ML
2 VIAL (ML) INJECTION ONCE
Refills: 0 | Status: COMPLETED | OUTPATIENT
Start: 2022-10-14 | End: 2022-10-14

## 2022-10-14 RX ADMIN — MEXILETINE HYDROCHLORIDE 200 MILLIGRAM(S): 150 CAPSULE ORAL at 05:42

## 2022-10-14 RX ADMIN — MEROPENEM 100 MILLIGRAM(S): 1 INJECTION INTRAVENOUS at 05:43

## 2022-10-14 RX ADMIN — HUMAN INSULIN 22 UNIT(S): 100 INJECTION, SUSPENSION SUBCUTANEOUS at 17:27

## 2022-10-14 RX ADMIN — MAGNESIUM OXIDE 400 MG ORAL TABLET 400 MILLIGRAM(S): 241.3 TABLET ORAL at 13:50

## 2022-10-14 RX ADMIN — Medication 10 MILLIGRAM(S): at 22:32

## 2022-10-14 RX ADMIN — Medication 25 MILLIGRAM(S): at 17:26

## 2022-10-14 RX ADMIN — HYDROMORPHONE HYDROCHLORIDE 0.5 MILLIGRAM(S): 2 INJECTION INTRAMUSCULAR; INTRAVENOUS; SUBCUTANEOUS at 04:40

## 2022-10-14 RX ADMIN — HEPARIN SODIUM 14.5 UNIT(S)/HR: 5000 INJECTION INTRAVENOUS; SUBCUTANEOUS at 03:45

## 2022-10-14 RX ADMIN — Medication 125 MILLIGRAM(S): at 17:26

## 2022-10-14 RX ADMIN — Medication 25 MILLIGRAM(S): at 13:50

## 2022-10-14 RX ADMIN — Medication 3 MILLILITER(S): at 18:01

## 2022-10-14 RX ADMIN — PANTOPRAZOLE SODIUM 40 MILLIGRAM(S): 20 TABLET, DELAYED RELEASE ORAL at 12:29

## 2022-10-14 RX ADMIN — Medication 250 MILLIGRAM(S): at 07:00

## 2022-10-14 RX ADMIN — Medication 3 MILLILITER(S): at 05:44

## 2022-10-14 RX ADMIN — Medication 1 APPLICATION(S): at 13:50

## 2022-10-14 RX ADMIN — MEXILETINE HYDROCHLORIDE 200 MILLIGRAM(S): 150 CAPSULE ORAL at 21:29

## 2022-10-14 RX ADMIN — CHLORHEXIDINE GLUCONATE 15 MILLILITER(S): 213 SOLUTION TOPICAL at 05:43

## 2022-10-14 RX ADMIN — Medication 3 MILLILITER(S): at 00:24

## 2022-10-14 RX ADMIN — Medication 0.5 MILLIGRAM(S): at 05:44

## 2022-10-14 RX ADMIN — HEPARIN SODIUM 14.5 UNIT(S)/HR: 5000 INJECTION INTRAVENOUS; SUBCUTANEOUS at 04:40

## 2022-10-14 RX ADMIN — MAGNESIUM OXIDE 400 MG ORAL TABLET 400 MILLIGRAM(S): 241.3 TABLET ORAL at 21:29

## 2022-10-14 RX ADMIN — Medication 125 MILLIGRAM(S): at 05:41

## 2022-10-14 RX ADMIN — ACETAZOLAMIDE 110 MILLIGRAM(S): 250 TABLET ORAL at 04:15

## 2022-10-14 RX ADMIN — HYDROMORPHONE HYDROCHLORIDE 0.5 MILLIGRAM(S): 2 INJECTION INTRAMUSCULAR; INTRAVENOUS; SUBCUTANEOUS at 04:55

## 2022-10-14 RX ADMIN — Medication 20 MILLIGRAM(S): at 17:25

## 2022-10-14 RX ADMIN — MAGNESIUM OXIDE 400 MG ORAL TABLET 400 MILLIGRAM(S): 241.3 TABLET ORAL at 07:10

## 2022-10-14 RX ADMIN — HUMAN INSULIN 22 UNIT(S): 100 INJECTION, SUSPENSION SUBCUTANEOUS at 12:34

## 2022-10-14 RX ADMIN — Medication 20 MILLIGRAM(S): at 05:41

## 2022-10-14 RX ADMIN — MEXILETINE HYDROCHLORIDE 200 MILLIGRAM(S): 150 CAPSULE ORAL at 13:50

## 2022-10-14 RX ADMIN — Medication 4 MILLILITER(S): at 05:45

## 2022-10-14 RX ADMIN — Medication 25 GRAM(S): at 03:45

## 2022-10-14 RX ADMIN — Medication 25 MILLIGRAM(S): at 06:58

## 2022-10-14 RX ADMIN — MEROPENEM 100 MILLIGRAM(S): 1 INJECTION INTRAVENOUS at 21:30

## 2022-10-14 RX ADMIN — MEROPENEM 100 MILLIGRAM(S): 1 INJECTION INTRAVENOUS at 15:13

## 2022-10-14 RX ADMIN — Medication 8 MILLIGRAM(S): at 06:58

## 2022-10-14 RX ADMIN — HUMAN INSULIN 25 UNIT(S): 100 INJECTION, SUSPENSION SUBCUTANEOUS at 01:09

## 2022-10-14 RX ADMIN — Medication 25 MILLIGRAM(S): at 05:41

## 2022-10-14 RX ADMIN — Medication 3 MILLILITER(S): at 11:18

## 2022-10-14 RX ADMIN — Medication 125 MICROGRAM(S): at 12:29

## 2022-10-14 RX ADMIN — Medication 0.5 MILLIGRAM(S): at 18:01

## 2022-10-14 RX ADMIN — FLUCONAZOLE 150 MILLIGRAM(S): 150 TABLET ORAL at 21:29

## 2022-10-14 RX ADMIN — HEPARIN SODIUM 13 UNIT(S)/HR: 5000 INJECTION INTRAVENOUS; SUBCUTANEOUS at 17:25

## 2022-10-14 RX ADMIN — Medication 25 MILLIGRAM(S): at 21:29

## 2022-10-14 RX ADMIN — Medication 250 MILLIGRAM(S): at 19:45

## 2022-10-14 RX ADMIN — CHLORHEXIDINE GLUCONATE 1 APPLICATION(S): 213 SOLUTION TOPICAL at 06:48

## 2022-10-14 RX ADMIN — Medication 4: at 17:27

## 2022-10-14 RX ADMIN — CHLORHEXIDINE GLUCONATE 15 MILLILITER(S): 213 SOLUTION TOPICAL at 17:25

## 2022-10-14 RX ADMIN — Medication 1 TABLET(S): at 12:30

## 2022-10-14 RX ADMIN — Medication 4 MILLILITER(S): at 18:01

## 2022-10-14 NOTE — PROGRESS NOTE ADULT - SUBJECTIVE AND OBJECTIVE BOX
Patient seen and examined at the bedside.    Remained critically ill on continuous ICU monitoring.    OBJECTIVE:  ICU Vital Signs Last 24 Hrs  T(C): 36.9 (14 Oct 2022 04:00), Max: 37 (14 Oct 2022 00:00)  T(F): 98.4 (14 Oct 2022 04:00), Max: 98.6 (14 Oct 2022 00:00)  HR: 69 (14 Oct 2022 08:05) (56 - 90)  BP: --  BP(mean): --  ABP: 115/47 (14 Oct 2022 08:00) (115/47 - 176/65)  ABP(mean): 70 (14 Oct 2022 08:00) (70 - 100)  RR: 21 (14 Oct 2022 08:00) (14 - 34)  SpO2: 98% (14 Oct 2022 08:05) (91% - 100%)    O2 Parameters below as of 14 Oct 2022 08:00  Patient On (Oxygen Delivery Method): tracheostomy collar    O2 Concentration (%): 50        Physical Exam:   General: trached  Neurology: awake, minimally interactive, able to follow simple commands  ENT/Neck: Neck supple, trachea midline, No JVD  Respiratory: rhonchi throughout  +trach with minimal secretions  CV: S1S2, no murmurs        [x] Sinus Rhythm   Abdominal: Soft, NT, ND, colostomy in place  Extremities: +4 RUE edema, 3+LUE edema, 2 + pedal edema noted, + peripheral pulses   Skin: Dry dressing over right heel, no Rashes, Hematoma, Ecchymosis , R elbow wound w/ eschar and swelling                        Assessment:  71F PMH DM, COPD, Chronic Adrenal Insufficiency on Chronic prednisone, history of colorectal cancer s/p resection (colostomy bag), Hx of CAD, Chronic A fib on Eliquis, and tracheomalacia and multiple intubations, recent dx of OM presented to ED at [facility] this morning with epigastric pain, belching and central chest pain. Found on CTA to have type A aortic dissection and transferred to [facility] for surgical evaluation by Dr. Cabrera.     Ascending aortic dissection s/p type A dissection repair and biobentall on 9/7   Respiratory failure s/p Trach 10/10/22  Hypovolemia   Post op respiratory failure   Acute blood loss anemia  Stress hyperglycemia   Leukocytosis   RIJ thrombus  MRSA PNA          Plan:   ***Neuro***  Off sedation, follows simple commands, continue to monitor  PT/OT progress as tolerated      ***Cardiovascular***  Limited TTE on 10/1: EF 69%, Hyperdynamic left ventricular systolic function. There is hypokinesis of the mid-base inferior wall. Nml RV fxn.   CT chest on 9/28: No CT evidence of pulmonary embolism. Status post repair of ascending aortic dissection with a stable dissection flap originating from the aortic arch and extending into the infrarenal abdominal aorta,  B/l UE duplex on 10/5: As before, there is an occluded RIGHT IJ vein with thrombosis extending to brachiocephalic vein. Superficial thrombophlebitis of the LEFT cephalic vein.  Invasive hemodynamic monitoring, assess perfusion indices   SR / CVP 6/ MAP 81/ Hct 24.5/ Lactate 2.1  [x] hydralazine PO for BP management up-titrated to 25 TID, HR 55-65 so unable to up-titrate lopressor  Rate control with Mexiletine Digoxin, and Lopressor   [x] AC Therapy with Heparin   Reassessment of hemodynamics  Serial EKG and cardiac enzymes       ***Pulmonary***  Respiratory failure s/p Trach #8 portex  10/10/22, per ENT inner cannula needs to be changed daily, sutures can be DC day 7 (10/17)  Post op vent management   Titration of FiO2 and PEEP, follow SpO2, CXR, blood gasses   Tolerated TC trials yesterday  CPAP/TC trials as tolerated today      Mode: standby  FiO2: 50      ***GI***  [x] Diet:  TFs, Glucerna 1.5 @ 55cc/hr   [x] Protonix         ***Renal***  Continue to monitor I/Os, BUN/Creatinine.   Replete lytes PRN  Seven present   Diuresis with Lasix       ***ID***  SCx on 10/4+Methicillin resistant Staphylococcus aureus, c/w IVPB Vancomycin, next trough 4am 10/11 (plan for 6 week course in setting of valve surgery, ?11/8 end date)  9/27 blood culture Neela Glabarata, on flucanazole (lifelong suppression)  PO Vancomycin solution for C.diff prophylaxis   R elbow wound w/ eschar-> wound team evaluated, ortho signed off (neg xrays, not likely septic arthritis)  10/12 with rising WBC, central line changed, ID re-consulted and meropenum reinitiated. blood cultures sent, f/u results  Wound re-consulted yesterday for further managment US soft tissue ordered yesterday to r/o abcess/fluid collection-->  IR for elbow drainage yesterday, will follow up cultures sent  Will follow up ID recs/ cultures  Continue abx regimen vanco/suraj/ flucanazole until cultures finalize      ***Endocrine***  [x]  DM : HbA1c 9.4%                - [x] ISS  [x] NPH              - Need tight glycemic control to prevent wound infection.        Patient requires continuous monitoring with bedside rhythm monitoring, pulse oximetry monitoring, and continuous central venous and arterial pressure monitoring; and intermittent blood gas analysis. Care plan discussed with the ICU care team.   Patient remained critical, at risk for life threatening decompensation.    I have spent 40 minutes providing critical care management to this patient.

## 2022-10-14 NOTE — PROGRESS NOTE ADULT - ASSESSMENT
71F w/h/o uncontrolled T2DM (A1C 9.4%) on basal/bolus insulin PTA. Unknown DM complications. Also COPD, secondary adrenal Insufficiency on chronic steroids, colorectal cancer s/p resection (colostomy bag), Chronic A fib on Eliquis, and tracheomalacia and multiple intubations, recent dx of OM presented to ED at [facility] with epigastric pain, belching and central chest pain. Found on CTA to have type A aortic dissection and transferred to [facility] for surgical evaluation by Dr. Cabrera. Now s/p aortic dissection repair on 9/07/22. Endocrine consulted for assistance with uncontrolled DM/steroids for AI. Pt in ICU with ventricular dysfunction/sepsis, and now s/p trach 10/10, off pressors and tolerating TFs with BG above goal while on present insulin doses.  Back on Prednisone dose 8mg      Type 2 diabetes mellitus with hyperglycemia.   ·  Plan:  -hypo today iso possible TF being held o/n   - BG Goal 100-180mg/dl    -test BG q6h if NPO/TF   - Decrease NPH dose to 22 units q 6h while on TFs. HOLD IF TFS ARE OFF.  -C/w Admelog moderate correction scale q6h  -would verify what long term feeding plan is, ?PEG?   Discharge plan:  - Likely to discharge patient home on basal/bolus insulin. Final regimen pending clinical course.  - Recommend routine outpatient ophthalmology, podiatry  - Can f/u with endocrinologist Dr. Saavedra.  - Make sure pt has Rx for all DM supplies and insulin/ DM meds.  -Based on pt's clinical condition and mental status at this time she is not able to independently manage her DM. Will evaluate pt's ability to manage DM at time of discharge. If unable> pt will need family/ care giver (s) to manage DM care at home  -Will need rehab.     Problem/Plan - 2:  ·  Problem: Adrenal insufficiency.   ·  Plan: On chronic steroids at home: medrol 8mg qd   Completed stress steroid doses/taper  Re started Prednisone 8mg qd 10/8  Will follow up pt status.     Problem/Plan - 3:  ·  Problem: Hyperlipidemia.   ·  Plan: Off rosuvastatin 5mg daily  Re start if not contraindicated and as per cardiology  -F/u levels as out pt.    Discussed with patient and primary  team Radha HAGER And RN   Contact via Microsoft Teams during business hours  On evenings and weekends, please call [phone] or page endocrine fellow on call.   Email: [email or web address]   Please note that this patient may be followed by different provider tomorrow.    greater than 50% of the encounter was spent counseling and/or coordination of care.  26 minutes spent on total encounter; The necessity of the time spent during the encounter on this date of service was due to development of plan of care/coordination of care/glycemic control through review of labs, blood glucose values and vital signs.  71F w/h/o uncontrolled T2DM (A1C 9.4%) on basal/bolus insulin PTA. Unknown DM complications. Also COPD, secondary adrenal Insufficiency on chronic steroids, colorectal cancer s/p resection (colostomy bag), Chronic A fib on Eliquis, and tracheomalacia and multiple intubations, recent dx of OM presented to ED at [facility] with epigastric pain, belching and central chest pain. Found on CTA to have type A aortic dissection and transferred to [facility] for surgical evaluation by Dr. Cabrera. Now s/p aortic dissection repair on 9/07/22. Endocrine consulted for assistance with uncontrolled DM/steroids for AI. Pt in ICU with ventricular dysfunction/sepsis, and now s/p trach 10/10, off pressors and tolerating TFs with BG above goal while on present insulin doses.  Back on Prednisone dose 8mg      Type 2 diabetes mellitus with hyperglycemia.   ·  Plan:  -hypo today iso possible TF being held o/n   - BG Goal 100-180mg/dl    -test BG q6h if NPO/TF   - Decrease NPH dose to 22 units q 6h while on TFs. HOLD IF TFS ARE OFF.  -C/w Admelog moderate correction scale q6h  -would verify what long term feeding plan is, ?PEG?   Discharge plan:  - Likely to discharge patient home on basal/bolus insulin. Final regimen pending clinical course.  - Recommend routine outpatient ophthalmology, podiatry  - Can f/u with endocrinologist Dr. Saavedra.  - Make sure pt has Rx for all DM supplies and insulin/ DM meds.  -Based on pt's clinical condition and mental status at this time she is not able to independently manage her DM. Will evaluate pt's ability to manage DM at time of discharge. If unable> pt will need family/ care giver (s) to manage DM care at home  -Will need rehab.     Problem/Plan - 2:  ·  Problem: Adrenal insufficiency.   ·  Plan: On chronic steroids at home: medrol 8mg qd   Completed stress steroid doses/taper  Re started Prednisone 8mg qd 10/8  Will follow up pt status.     Problem/Plan - 3:  ·  Problem: Hyperlipidemia.   ·  Plan: Off rosuvastatin 5mg daily  Re start if not contraindicated and as per cardiology  -F/u levels as out pt.    Over weekend please call [phone] or page endocrine fellow on call as needed for any endocrine concerns or email [email or web address]     Discussed with patient and primary  team Radha HAGER And RN   Contact via Microsoft Teams during business hours  On evenings and weekends, please call [phone] or page endocrine fellow on call.   Email: [email or web address]   Please note that this patient may be followed by different provider tomorrow.    greater than 50% of the encounter was spent counseling and/or coordination of care.  26 minutes spent on total encounter; The necessity of the time spent during the encounter on this date of service was due to development of plan of care/coordination of care/glycemic control through review of labs, blood glucose values and vital signs.

## 2022-10-14 NOTE — PROGRESS NOTE ADULT - ASSESSMENT
71 year-old female with a history of DM2, CAD, A-Fib on apixaban, Severe Persistent Asthma (on chronic steroids, recently started on Tezspire), colon cancer s/p resection/chemo, and tracheobronchomalacia s/p tracheoplasty, and recent OM of the R foot s/b debridement and completed course of Vancomycin/Ertapenem for MRSA/ESBL Proteus/Corynebacterium who now presents with chest pain, found to have Type A dissection s/p repair with modified Bentall procedure and hemiarch replacement on 9/6/22. Post-op course complicated by acute hypoxemic respiratory failure, shock, anemia, and hyperglycemia requiring insulin gtt. Extubated 9/13, now awake and alert with mild encephalopathy but overall significantly improved.    Assessment:  Acute hypoxemic respiratory failure requiring intubation  Type A Aortic Dissection  Severe Persistent Asthma  History of Tracheobronchomalacia  fevers and MSSA bacteremia and tracheal aspirate material with MSSA    -decreasing leukocytosis  Fevers and MRSA bacteremia last + culture 9/28  last positive Candida blood culture 9/30  Continue IV vancomycin- trough 15. on 10/14 is therapeutic maintain current dosing     TTE performed 10/1 no evidence of vegetations on the valves EF improving but would favor repeating another TTE next week  Leukocytosis could also be in part due to extensive DVT RUE she underwent drainage of RUE timi-elbow fluid with cultures no growth to date        MRSA  bacteremia and candidemia  Will plan to treat for 4-6 weeks in the setting of post surgical repair of thoracic aorta dissection  Continue to follow CBC  Continue to follow creatinine  Continue to follow Vanco trough levels  repeat blood cultures for evidence of fungemia and bacteremia clearance show evidence of clearing of infections          Discussed with CTU team    Jeff Calixto MD  Can be called via Teams  After 5pm/weekends [phone]

## 2022-10-14 NOTE — PROGRESS NOTE ADULT - SUBJECTIVE AND OBJECTIVE BOX
ENT ISSUE/POD: S/P #8 Portex  Cuffed Tracheostomy POD # 4    HPI: 70yo Female s/p #8 Portex Cuffed Tracheostomy POD #4. Pt seen and examined at bedside. No acute events overnight. Doing well on CPAP with no reported issues.     PAST MEDICAL & SURGICAL HISTORY:  Atrial fibrillation  paroxysmal, on eliquis      Diabetes  Type 2      COPD (chronic obstructive pulmonary disease)      Adrenal insufficiency  Medrol daily for over 50 years      Aortic insufficiency  moderate AR on echo 5/3/2018      Pelvic fracture      Asthma      Tracheobronchomalacia  diagnosed 2015, s/p bronchial thermoplasty 2016 (Dr Zapien); recent bronchoscopy 6/5/2018 revealed no evidence of tracheobronchomalacia in trachea or bronchial tubes      Colorectal cancer  4/2018- last treatment , chemo and radiation      Rectal bleeding      Seizure  x 1 1/7/18      DVT (deep venous thrombosis)  15-20 years ago, took coumadin      TIA (transient ischemic attack)  multiple, last 5 years ago - presents as right-sided weakness      History of partial hysterectomy  30 years ago - fibroids      H/O total knee replacement, bilateral  5 years ago      History of sinus surgery  multiple sinus surgeries      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)- course complicated by obstruction, respiratory failure, multiple CPR attempts -  stent discontinued; 10/20/2016 Tracheobronchoplasty (Prolene Mesh) performed at [facility] by Dr Zapien      S/P bronchoscopy  6/5/2018 - [place] (Dr Zapien) no evidence of tracheobronchomalacia in trachea or bronchial tubes      Rectal bleeding  exam under anesthesia ([facility]) 2/2018        Allergies    aspirin (Short breath)  Avelox (Short breath; Pruritus)  cefepime (Anaphylaxis)  codeine (Short breath)  Dilaudid (Short breath)  iodine (Short breath; Swelling)  penicillin (Anaphylaxis)  shellfish (Anaphylaxis)  tetanus toxoid (Short breath)  Valium (Short breath)    Intolerances      MEDICATIONS  (STANDING):  acetylcysteine 10%  Inhalation 4 milliLiter(s) Inhalation every 12 hours  albuterol/ipratropium for Nebulization 3 milliLiter(s) Nebulizer every 6 hours  buDESOnide    Inhalation Suspension 0.5 milliGRAM(s) Inhalation every 12 hours  chlorhexidine 0.12% Liquid 15 milliLiter(s) Oral Mucosa every 12 hours  chlorhexidine 2% Cloths 1 Application(s) Topical <User Schedule>  collagenase Ointment 1 Application(s) Topical daily  dextrose 50% Injectable 50 milliLiter(s) IV Push every 15 minutes  digoxin  Injectable 125 MICROGram(s) IV Push daily  fluconAZOLE IVPB 600 milliGRAM(s) IV Intermittent every 24 hours  furosemide   Injectable 20 milliGRAM(s) IV Push every 12 hours  heparin  Infusion 1050 Unit(s)/Hr (14.5 mL/Hr) IV Continuous <Continuous>  hydrALAZINE 25 milliGRAM(s) Oral every 8 hours  insulin lispro (ADMELOG) corrective regimen sliding scale   SubCutaneous every 6 hours  insulin NPH human recombinant 25 Unit(s) SubCutaneous every 6 hours  magnesium oxide 400 milliGRAM(s) Oral every 8 hours  meropenem  IVPB 1000 milliGRAM(s) IV Intermittent every 8 hours  methylPREDNISolone 8 milliGRAM(s) Oral daily  metoprolol tartrate 25 milliGRAM(s) Oral two times a day  mexiletine 200 milliGRAM(s) Oral every 8 hours  multivitamin 1 Tablet(s) Oral daily  pantoprazole  Injectable 40 milliGRAM(s) IV Push daily  sodium chloride 0.9%. 1000 milliLiter(s) (10 mL/Hr) IV Continuous <Continuous>  vancomycin    Solution 125 milliGRAM(s) Oral every 12 hours  vancomycin  IVPB 1000 milliGRAM(s) IV Intermittent every 12 hours    MEDICATIONS  (PRN):  acetaminophen    Suspension .. 650 milliGRAM(s) Oral every 6 hours PRN Mild Pain (1 - 3)      social history: see consult     family history: see consult     ROS:   ENT: all negative except as noted in HPI   Pulm: denies SOB, cough, hemoptysis  Neuro: denies numbness/tingling, loss of sensation  Endo: denies heat/cold intolerance, excessive sweating      Vital Signs Last 24 Hrs  T(C): 36.9 (14 Oct 2022 04:00), Max: 37 (14 Oct 2022 00:00)  T(F): 98.4 (14 Oct 2022 04:00), Max: 98.6 (14 Oct 2022 00:00)  HR: 75 (14 Oct 2022 07:00) (56 - 90)  BP: --  BP(mean): --  RR: 19 (14 Oct 2022 07:00) (14 - 34)  SpO2: 100% (14 Oct 2022 07:00) (91% - 100%)    Parameters below as of 14 Oct 2022 05:44  Patient On (Oxygen Delivery Method): ventilator                              7.8    14.71 )-----------( 289      ( 14 Oct 2022 03:06 )             25.9    10-14    139  |  102  |  14  ----------------------------<  123<H>  3.9   |  26  |  0.44<L>    Ca    8.9      14 Oct 2022 03:05  Phos  3.1     10-14  Mg     1.8     10-14    TPro  6.6  /  Alb  3.5  /  TBili  0.5  /  DBili  x   /  AST  27  /  ALT  61<H>  /  AlkPhos  172<H>  10-14   PT/INR - ( 13 Oct 2022 03:12 )   PT: 14.5 sec;   INR: 1.26 ratio         PTT - ( 14 Oct 2022 03:06 )  PTT:47.3 sec    PHYSICAL EXAM:  Gen: NAD  Skin: No rashes, bruises, or lesions  Head: Normocephalic, Atraumatic  Face  no erythema, or fluctuance. Parotid glands soft without mass  Eyes: no scleral injection  Nose: Nares bilaterally patent, no discharge  Mouth: No Stridor / Drooling / Trismus.  Mucosa moist, tongue/uvula midline, oropharynx clear  Neck: Portex #8 cuffed trach in place, velcro tie in place  Flat, supple, no lymphadenopathy, trachea midline, no masses  Lymphatic: No lymphadenopathy  Resp: tolerating CPAP  Neuro: facial nerve intact, no facial droop

## 2022-10-14 NOTE — PROGRESS NOTE ADULT - SUBJECTIVE AND OBJECTIVE BOX
INFECTIOUS DISEASES FOLLOW UP-- Fouzia Calixto  [phone]    This is a follow up note for this  72yFemale with  Dissection of thoracic aorta  clinically improving  trach collar  interactive        ROS:  CONSTITUTIONAL:  No fever, good appetite  CARDIOVASCULAR:  No chest pain or palpitations  RESPIRATORY:  No dyspnea  GASTROINTESTINAL:  No nausea, vomiting, diarrhea, or abdominal pain  GENITOURINARY:  No dysuria  NEUROLOGIC:  No headache,     Allergies    aspirin (Short breath)  Avelox (Short breath; Pruritus)  cefepime (Anaphylaxis)  codeine (Short breath)  Dilaudid (Short breath)  iodine (Short breath; Swelling)  penicillin (Anaphylaxis)  shellfish (Anaphylaxis)  tetanus toxoid (Short breath)  Valium (Short breath)    Intolerances        ANTIBIOTICS/RELEVANT:  antimicrobials  fluconAZOLE IVPB 600 milliGRAM(s) IV Intermittent every 24 hours  meropenem  IVPB 1000 milliGRAM(s) IV Intermittent every 8 hours  vancomycin    Solution 125 milliGRAM(s) Oral every 12 hours  vancomycin  IVPB 1000 milliGRAM(s) IV Intermittent every 12 hours    immunologic:    OTHER:  acetaminophen    Suspension .. 650 milliGRAM(s) Oral every 6 hours PRN  acetylcysteine 10%  Inhalation 4 milliLiter(s) Inhalation every 12 hours  albuterol/ipratropium for Nebulization 3 milliLiter(s) Nebulizer every 6 hours  buDESOnide    Inhalation Suspension 0.5 milliGRAM(s) Inhalation every 12 hours  chlorhexidine 0.12% Liquid 15 milliLiter(s) Oral Mucosa every 12 hours  chlorhexidine 2% Cloths 1 Application(s) Topical <User Schedule>  collagenase Ointment 1 Application(s) Topical daily  dextrose 50% Injectable 50 milliLiter(s) IV Push every 15 minutes  digoxin  Injectable 125 MICROGram(s) IV Push daily  furosemide   Injectable 20 milliGRAM(s) IV Push every 12 hours  heparin  Infusion 1050 Unit(s)/Hr IV Continuous <Continuous>  hydrALAZINE 25 milliGRAM(s) Oral every 8 hours  insulin lispro (ADMELOG) corrective regimen sliding scale   SubCutaneous every 6 hours  insulin NPH human recombinant 22 Unit(s) SubCutaneous every 6 hours  magnesium oxide 400 milliGRAM(s) Oral every 8 hours  methylPREDNISolone 8 milliGRAM(s) Oral daily  metoprolol tartrate 25 milliGRAM(s) Oral two times a day  mexiletine 200 milliGRAM(s) Oral every 8 hours  multivitamin 1 Tablet(s) Oral daily  pantoprazole  Injectable 40 milliGRAM(s) IV Push daily  sodium chloride 0.9%. 1000 milliLiter(s) IV Continuous <Continuous>      Objective:  Vital Signs Last 24 Hrs  T(C): 36.6 (14 Oct 2022 16:00), Max: 37 (14 Oct 2022 00:00)  T(F): 97.9 (14 Oct 2022 16:00), Max: 98.6 (14 Oct 2022 00:00)  HR: 79 (14 Oct 2022 16:00) (56 - 90)  BP: --  BP(mean): --  RR: 27 (14 Oct 2022 16:00) (14 - 34)  SpO2: 97% (14 Oct 2022 16:00) (97% - 100%)    Parameters below as of 14 Oct 2022 14:15  Patient On (Oxygen Delivery Method): tracheostomy collar        PHYSICAL EXAM:  Constitutional:no acute distress  Eyes:EBER, EOMI  Ear/Nose/Throat: no oral lesions,trach collar 	  Respiratory: audible BL  Cardiovascular: S1S2  sternotomy wound CDI  Gastrointestinal:soft, (+) BS, no tenderness  Extremities:no e/e/c  No Lymphadenopathy  IV sites not inflammed.    LABS:                        7.8    14.71 )-----------( 289      ( 14 Oct 2022 03:06 )             25.9     10-14    139  |  102  |  14  ----------------------------<  123<H>  3.9   |  26  |  0.44<L>    Ca    8.9      14 Oct 2022 03:05  Phos  3.1     10-14  Mg     1.8     10-14    TPro  6.6  /  Alb  3.5  /  TBili  0.5  /  DBili  x   /  AST  27  /  ALT  61<H>  /  AlkPhos  172<H>  10-14    PT/INR - ( 13 Oct 2022 03:12 )   PT: 14.5 sec;   INR: 1.26 ratio         PTT - ( 14 Oct 2022 08:26 )  PTT:64.7 sec      MICROBIOLOGY:            RECENT CULTURES:  10-13 @ 10:52  .Blood Blood  --  --  --    No growth to date.  --  10-12 @ 17:38  .Bronchial Bronchial Lavage  --  --  --    Normal Respiratory Jessica present  --  10-12 @ 17:25  .Blood Blood  --  --  --    No growth to date.  --  10-12 @ 02:40  .Blood Blood-Peripheral  --  --  --    No growth to date.  --  10-12 @ 02:19  .Blood Blood-Peripheral  --  --  --    No growth to date.  --  10-08 @ 01:17  .Blood Blood-Peripheral  --  --  --    No Growth Final  --  10-08 @ 00:50  .Blood Blood-Peripheral  --  --  --    No Growth Final  --      RADIOLOGY & ADDITIONAL STUDIES:    < from: Xray Chest 1 View- PORTABLE-Urgent (Xray Chest 1 View- PORTABLE-Urgent .) (10.12.22 @ 17:20) >    IMPRESSION:  Left IJ catheter with tip in the SVC.  Bibasilar patchy atelectasis. Small bilateral pleural effusions.    < end of copied text >

## 2022-10-14 NOTE — PROGRESS NOTE ADULT - NUTRITIONAL ASSESSMENT
This patient has been assessed with a concern for Malnutrition and has been determined to have a diagnosis/diagnoses of Moderate protein-calorie malnutrition.    This patient is being managed with:   Diet NPO with Tube Feed-  Tube Feeding Modality: Nasogastric  Glucerna 1.5 Chago (GLUCERNA1.5RTH)  Total Volume for 24 Hours (mL): 1320  Continuous  Starting Tube Feed Rate {mL per Hour}: 55  Until Goal Tube Feed Rate (mL per Hour): 55  Tube Feed Duration (in Hours): 24  Tube Feed Start Time: 10:00  Entered: Oct 10 2022  3:16PM

## 2022-10-14 NOTE — PROGRESS NOTE ADULT - ASSESSMENT
72yo Female s/p #8 Portex Cuffed Tracheostomy POD #4. Pt doing well on CPAP with no reported issues.

## 2022-10-14 NOTE — PROGRESS NOTE ADULT - PROBLEM SELECTOR PLAN 1
- Keep the Trach site clean and dry.   - Remove Sutures on POD #7.   - Keep the Omniflex for a week to avoid the manipulation of the stoma.   - Elevate HOB.   - Gentle suction prn.   - ENT will continue to follow.

## 2022-10-14 NOTE — PROGRESS NOTE ADULT - ASSESSMENT
71 year-old female with a history of DM2, CAD, A-Fib on apixaban, Severe Persistent Asthma (on chronic steroids, recently started on Tezspire), colon cancer s/p resection/chemo, and tracheobronchomalacia s/p tracheoplasty, and recent OM of the R foot s/b debridement and completed course of Vancomycin/Ertapenem for MRSA/ESBL Proteus/Corynebacterium who now presents with chest pain, found to have Type A dissection s/p repair with modified Bentall procedure and hemiarch replacement on 22. Post-op course complicated by acute hypoxemic respiratory failure, shock, anemia, and hyperglycemia requiring insulin gtt. Extubated , now awake and alert with mild encephalopathy but overall significantly improved.    Assessment:  Acute hypoxemic respiratory failure requiring intubation  Type A Aortic Dissection  Severe Persistent Asthma  History of Tracheobronchomalacia    Plan:  See below:  -**************************                                SEE Below:  -improving but weakness  9/15-ABX adjusted to ceftriaxone (LFTS)-PICU  -PICU, low grade temp-fever work up in progress, no resp decline or sx  -resp stable at present but tenuous  -for FEESST today, NGT in place w/TF  -s/p FEESST-passed, remains in PICU          -TF in place at 40cc, more OOB          -hypoglycemia noted and rx, no change in resp status  -vented/sedated-pressors/inotropes/ABX  -remains vented on nitrous/less O2 needs, improving LFTS                   -vented/semi sedated--less O2 needs  10/3-on vanco, weaning sedation/, all lines changed  10/6-no changes-now afebrile-on vanco   10/7-no weaning-remains off pressors  10/10-for trach, no weaning done               10/11-s/p trach-uneventful  10/12-TC and ;cpap done and tolerated well  10/13-weaning TC continues              10/14-mucus issues-TC continues

## 2022-10-14 NOTE — PROGRESS NOTE ADULT - SUBJECTIVE AND OBJECTIVE BOX
seen earlier today     Chief Complaint: Type 2 Diabetes Mellitus     INTERVAL HX: mild hypo to 60s early AM w/ 6am nph dose held, per d/w team possible that TF held for a few hours o/n . confirmed pt has been on TF since atleast 6am at this time w/ Noon BG at goal 103, d/w team on floor recommend reducing NPH dose slighlty. shaking head yes/ no to questions .       Review of Systems:  General: As above  Cardiovascular: No chest pain  Respiratory: No SOB  GI: No nausea, vomiting  Endocrine: no  S&Sx of hypoglycemia    Allergies    aspirin (Short breath)  Avelox (Short breath; Pruritus)  cefepime (Anaphylaxis)  codeine (Short breath)  Dilaudid (Short breath)  iodine (Short breath; Swelling)  penicillin (Anaphylaxis)  shellfish (Anaphylaxis)  tetanus toxoid (Short breath)  Valium (Short breath)    Intolerances      MEDICATIONS  (STANDING):  acetylcysteine 10%  Inhalation 4 milliLiter(s) Inhalation every 12 hours  albuterol/ipratropium for Nebulization 3 milliLiter(s) Nebulizer every 6 hours  buDESOnide    Inhalation Suspension 0.5 milliGRAM(s) Inhalation every 12 hours  chlorhexidine 0.12% Liquid 15 milliLiter(s) Oral Mucosa every 12 hours  chlorhexidine 2% Cloths 1 Application(s) Topical <User Schedule>  collagenase Ointment 1 Application(s) Topical daily  dextrose 50% Injectable 50 milliLiter(s) IV Push every 15 minutes  digoxin  Injectable 125 MICROGram(s) IV Push daily  fluconAZOLE IVPB 600 milliGRAM(s) IV Intermittent every 24 hours  furosemide   Injectable 20 milliGRAM(s) IV Push every 12 hours  heparin  Infusion 1050 Unit(s)/Hr (14 mL/Hr) IV Continuous <Continuous>  hydrALAZINE 25 milliGRAM(s) Oral every 8 hours  insulin lispro (ADMELOG) corrective regimen sliding scale   SubCutaneous every 6 hours  insulin NPH human recombinant 22 Unit(s) SubCutaneous every 6 hours  magnesium oxide 400 milliGRAM(s) Oral every 8 hours  meropenem  IVPB 1000 milliGRAM(s) IV Intermittent every 8 hours  methylPREDNISolone 8 milliGRAM(s) Oral daily  metoprolol tartrate 25 milliGRAM(s) Oral two times a day  mexiletine 200 milliGRAM(s) Oral every 8 hours  multivitamin 1 Tablet(s) Oral daily  pantoprazole  Injectable 40 milliGRAM(s) IV Push daily  sodium chloride 0.9%. 1000 milliLiter(s) (10 mL/Hr) IV Continuous <Continuous>  vancomycin    Solution 125 milliGRAM(s) Oral every 12 hours  vancomycin  IVPB 1000 milliGRAM(s) IV Intermittent every 12 hours        insulin NPH human recombinant   22 Unit(s) SubCutaneous (10-14-22 @ 12:34)    insulin NPH human recombinant   25 Unit(s) SubCutaneous (10-14-22 @ 01:09)   25 Unit(s) SubCutaneous (10-13-22 @ 18:15)    methylPREDNISolone   8 milliGRAM(s) Oral (10-14-22 @ 06:58)        PHYSICAL EXAM:  VITALS: T(C): 36.4 (10-14-22 @ 12:00)  T(F): 97.5 (10-14-22 @ 12:00), Max: 98.6 (10-14-22 @ 00:00)  HR: 85 (10-14-22 @ 14:15) (56 - 90)  BP: --  RR:  (14 - 34)  SpO2:  (98% - 100%)  Wt(kg): --  GENERAL: female laying in chair in NAD, trach, NGT   Respiratory: Respirations unlabored   Extremities: Warm, no edema  NEURO: Alert shaking head yes no to questions    LABS:    POCT Blood Glucose.: 126 mg/dL (10-14-22 @ 12:24)  POCT Blood Glucose.: 103 mg/dL (10-14-22 @ 11:11)  POCT Blood Glucose.: 73 mg/dL (10-14-22 @ 10:10)  POCT Blood Glucose.: 76 mg/dL (10-14-22 @ 09:14)  POCT Blood Glucose.: 74 mg/dL (10-14-22 @ 07:12)  POCT Blood Glucose.: 60 mg/dL (10-14-22 @ 06:56)  POCT Blood Glucose.: 118 mg/dL (10-14-22 @ 00:28)  POCT Blood Glucose.: 134 mg/dL (10-13-22 @ 18:03)  POCT Blood Glucose.: 176 mg/dL (10-13-22 @ 13:01)  POCT Blood Glucose.: 120 mg/dL (10-13-22 @ 06:41)  POCT Blood Glucose.: 137 mg/dL (10-12-22 @ 23:48)  POCT Blood Glucose.: 161 mg/dL (10-12-22 @ 17:31)  POCT Blood Glucose.: 284 mg/dL (10-12-22 @ 12:01)  POCT Blood Glucose.: 247 mg/dL (10-12-22 @ 05:28)  POCT Blood Glucose.: 123 mg/dL (10-12-22 @ 00:09)                            7.8    14.71 )-----------( 289      ( 14 Oct 2022 03:06 )             25.9       10-14    139  |  102  |  14  ----------------------------<  123<H>  3.9   |  26  |  0.44<L>    Ca    8.9      14 Oct 2022 03:05  Phos  3.1     10-14  Mg     1.8     10-14    TPro  6.6  /  Alb  3.5  /  TBili  0.5  /  DBili  x   /  AST  27  /  ALT  61<H>  /  AlkPhos  172<H>  10-14      eGFR: 101 mL/min/1.73m2 (14 Oct 2022 03:05)          Thyroid Function Tests:  10-03 @ 04:47 TSH 0.32 FreeT4 -- T3 -- Anti TPO -- Anti Thyroglobulin Ab -- TSI --          A1C with Estimated Average Glucose Result: 9.4 % (09-06-22 @ 16:46)      Estimated Average Glucose: 223 mg/dL (09-06-22 @ 16:46)

## 2022-10-14 NOTE — PROGRESS NOTE ADULT - TIME BILLING
as above: some mucus plugging but weaning continues (TC/cpap able) s/p  trach 10/10--remains in resp failure-sepsis [id number] vented/ABX-stable CV status                  **Mucus plugging persists-can use VEST rx if ok w/ CTS  multifactorial dyspnea-resp failure-severe persistent asthma, TBM s/p tracheoplasty, s/p Aortic aneurysm repair, Bronchitis (proteus)-O2-keep 90%; wean as able off sedation    ****MOVE to advance TC if able in full awake status  severe persistent asthma--medrol 8mg, singulair 10, duoneb q 6, budes .5 bid, tezspire 8/29-next 9/29  TBM-s/p tracheoplasty--accapella, unable to use vest due to surgery  s/p Aortic aneurysm repair--as per CTS staff care  AF-on heparin--eventual eliquis rx               CV-improved cardene-MAP above 60  DVT R-IJ-on heparin rx  ID-s/p proteus bronchitis-s/p meropenum changed to ceftriaxone and back to meropenem/vanco- off of ABX as of 9/19--restart of ABX 9/27-meropenem/vanco-NOW on meropenem and vanco for MRSE sepsis and fluconazole as per ID  PT-OOB as able                             GI-TF as able--f/up LFTs       VC dysfunction--aspiration precautions-s/p trach 10/10  GOC                                    Heme onc f/up colon ca  prog--critical in PICU                PT-to restart as improved    Eulalio Allison MD-Pulmonary   [phone]

## 2022-10-15 LAB
-  AMIKACIN: SIGNIFICANT CHANGE UP
-  AMOXICILLIN/CLAVULANIC ACID: SIGNIFICANT CHANGE UP
-  AMPICILLIN/SULBACTAM: SIGNIFICANT CHANGE UP
-  AMPICILLIN: SIGNIFICANT CHANGE UP
-  AZTREONAM: SIGNIFICANT CHANGE UP
-  CEFAZOLIN: SIGNIFICANT CHANGE UP
-  CEFEPIME: SIGNIFICANT CHANGE UP
-  CEFTRIAXONE: SIGNIFICANT CHANGE UP
-  CIPROFLOXACIN: SIGNIFICANT CHANGE UP
-  ERTAPENEM: SIGNIFICANT CHANGE UP
-  GENTAMICIN: SIGNIFICANT CHANGE UP
-  LEVOFLOXACIN: SIGNIFICANT CHANGE UP
-  MEROPENEM: SIGNIFICANT CHANGE UP
-  PIPERACILLIN/TAZOBACTAM: SIGNIFICANT CHANGE UP
-  TOBRAMYCIN: SIGNIFICANT CHANGE UP
-  TRIMETHOPRIM/SULFAMETHOXAZOLE: SIGNIFICANT CHANGE UP
ALBUMIN SERPL ELPH-MCNC: 3.9 G/DL — SIGNIFICANT CHANGE UP (ref 3.3–5)
ALP SERPL-CCNC: 185 U/L — HIGH (ref 40–120)
ALT FLD-CCNC: 52 U/L — HIGH (ref 10–45)
ANION GAP SERPL CALC-SCNC: 11 MMOL/L — SIGNIFICANT CHANGE UP (ref 5–17)
APTT BLD: 57.2 SEC — HIGH (ref 27.5–35.5)
APTT BLD: 58.7 SEC — HIGH (ref 27.5–35.5)
AST SERPL-CCNC: 21 U/L — SIGNIFICANT CHANGE UP (ref 10–40)
BILIRUB SERPL-MCNC: 0.5 MG/DL — SIGNIFICANT CHANGE UP (ref 0.2–1.2)
BUN SERPL-MCNC: 18 MG/DL — SIGNIFICANT CHANGE UP (ref 7–23)
CALCIUM SERPL-MCNC: 9.3 MG/DL — SIGNIFICANT CHANGE UP (ref 8.4–10.5)
CHLORIDE SERPL-SCNC: 102 MMOL/L — SIGNIFICANT CHANGE UP (ref 96–108)
CO2 SERPL-SCNC: 25 MMOL/L — SIGNIFICANT CHANGE UP (ref 22–31)
CREAT SERPL-MCNC: 0.41 MG/DL — LOW (ref 0.5–1.3)
EGFR: 103 ML/MIN/1.73M2 — SIGNIFICANT CHANGE UP
GAS PNL BLDA: SIGNIFICANT CHANGE UP
GLUCOSE BLDC GLUCOMTR-MCNC: 104 MG/DL — HIGH (ref 70–99)
GLUCOSE BLDC GLUCOMTR-MCNC: 147 MG/DL — HIGH (ref 70–99)
GLUCOSE BLDC GLUCOMTR-MCNC: 185 MG/DL — HIGH (ref 70–99)
GLUCOSE BLDC GLUCOMTR-MCNC: 206 MG/DL — HIGH (ref 70–99)
GLUCOSE SERPL-MCNC: 118 MG/DL — HIGH (ref 70–99)
HCT VFR BLD CALC: 31.4 % — LOW (ref 34.5–45)
HGB BLD-MCNC: 9.4 G/DL — LOW (ref 11.5–15.5)
INR BLD: 1.12 RATIO — SIGNIFICANT CHANGE UP (ref 0.88–1.16)
MAGNESIUM SERPL-MCNC: 2 MG/DL — SIGNIFICANT CHANGE UP (ref 1.6–2.6)
MCHC RBC-ENTMCNC: 23.9 PG — LOW (ref 27–34)
MCHC RBC-ENTMCNC: 29.9 GM/DL — LOW (ref 32–36)
MCV RBC AUTO: 79.7 FL — LOW (ref 80–100)
METHOD TYPE: SIGNIFICANT CHANGE UP
NRBC # BLD: 2 /100 WBCS — HIGH (ref 0–0)
PHOSPHATE SERPL-MCNC: 3.2 MG/DL — SIGNIFICANT CHANGE UP (ref 2.5–4.5)
PLATELET # BLD AUTO: 353 K/UL — SIGNIFICANT CHANGE UP (ref 150–400)
POTASSIUM SERPL-MCNC: 4 MMOL/L — SIGNIFICANT CHANGE UP (ref 3.5–5.3)
POTASSIUM SERPL-SCNC: 4 MMOL/L — SIGNIFICANT CHANGE UP (ref 3.5–5.3)
PROT SERPL-MCNC: 7.7 G/DL — SIGNIFICANT CHANGE UP (ref 6–8.3)
PROTHROM AB SERPL-ACNC: 12.9 SEC — SIGNIFICANT CHANGE UP (ref 10.5–13.4)
RBC # BLD: 3.94 M/UL — SIGNIFICANT CHANGE UP (ref 3.8–5.2)
RBC # FLD: 25.2 % — HIGH (ref 10.3–14.5)
SODIUM SERPL-SCNC: 138 MMOL/L — SIGNIFICANT CHANGE UP (ref 135–145)
VANCOMYCIN TROUGH SERPL-MCNC: 17.1 UG/ML — SIGNIFICANT CHANGE UP (ref 10–20)
WBC # BLD: 14.97 K/UL — HIGH (ref 3.8–10.5)
WBC # FLD AUTO: 14.97 K/UL — HIGH (ref 3.8–10.5)

## 2022-10-15 PROCEDURE — 99231 SBSQ HOSP IP/OBS SF/LOW 25: CPT

## 2022-10-15 PROCEDURE — 71045 X-RAY EXAM CHEST 1 VIEW: CPT | Mod: 26

## 2022-10-15 PROCEDURE — 99291 CRITICAL CARE FIRST HOUR: CPT | Mod: 24

## 2022-10-15 RX ORDER — MAGNESIUM SULFATE 500 MG/ML
1 VIAL (ML) INJECTION ONCE
Refills: 0 | Status: COMPLETED | OUTPATIENT
Start: 2022-10-15 | End: 2022-10-15

## 2022-10-15 RX ORDER — HYDRALAZINE HCL 50 MG
50 TABLET ORAL EVERY 8 HOURS
Refills: 0 | Status: DISCONTINUED | OUTPATIENT
Start: 2022-10-15 | End: 2022-10-15

## 2022-10-15 RX ORDER — HYDRALAZINE HCL 50 MG
10 TABLET ORAL ONCE
Refills: 0 | Status: COMPLETED | OUTPATIENT
Start: 2022-10-15 | End: 2022-10-15

## 2022-10-15 RX ORDER — HYDRALAZINE HCL 50 MG
37.5 TABLET ORAL EVERY 8 HOURS
Refills: 0 | Status: DISCONTINUED | OUTPATIENT
Start: 2022-10-15 | End: 2022-10-25

## 2022-10-15 RX ORDER — TOBRAMYCIN SULFATE 40 MG/ML
300 VIAL (ML) INJECTION EVERY 12 HOURS
Refills: 0 | Status: DISCONTINUED | OUTPATIENT
Start: 2022-10-15 | End: 2022-10-18

## 2022-10-15 RX ORDER — METOPROLOL TARTRATE 50 MG
25 TABLET ORAL ONCE
Refills: 0 | Status: COMPLETED | OUTPATIENT
Start: 2022-10-15 | End: 2022-10-15

## 2022-10-15 RX ORDER — METOPROLOL TARTRATE 50 MG
50 TABLET ORAL EVERY 12 HOURS
Refills: 0 | Status: DISCONTINUED | OUTPATIENT
Start: 2022-10-15 | End: 2022-10-16

## 2022-10-15 RX ADMIN — MEROPENEM 100 MILLIGRAM(S): 1 INJECTION INTRAVENOUS at 22:14

## 2022-10-15 RX ADMIN — Medication 3 MILLILITER(S): at 17:10

## 2022-10-15 RX ADMIN — Medication 250 MILLIGRAM(S): at 06:41

## 2022-10-15 RX ADMIN — Medication 3 MILLILITER(S): at 00:00

## 2022-10-15 RX ADMIN — Medication 50 MILLIGRAM(S): at 18:08

## 2022-10-15 RX ADMIN — Medication 8 MILLIGRAM(S): at 05:05

## 2022-10-15 RX ADMIN — Medication 4 MILLILITER(S): at 17:10

## 2022-10-15 RX ADMIN — Medication 1 APPLICATION(S): at 12:03

## 2022-10-15 RX ADMIN — CHLORHEXIDINE GLUCONATE 15 MILLILITER(S): 213 SOLUTION TOPICAL at 18:07

## 2022-10-15 RX ADMIN — CHLORHEXIDINE GLUCONATE 1 APPLICATION(S): 213 SOLUTION TOPICAL at 06:41

## 2022-10-15 RX ADMIN — Medication 0.5 MILLIGRAM(S): at 17:08

## 2022-10-15 RX ADMIN — MAGNESIUM OXIDE 400 MG ORAL TABLET 400 MILLIGRAM(S): 241.3 TABLET ORAL at 05:05

## 2022-10-15 RX ADMIN — Medication 3 MILLILITER(S): at 05:17

## 2022-10-15 RX ADMIN — Medication 25 MILLIGRAM(S): at 12:04

## 2022-10-15 RX ADMIN — Medication 125 MILLIGRAM(S): at 18:07

## 2022-10-15 RX ADMIN — Medication 3 MILLILITER(S): at 11:17

## 2022-10-15 RX ADMIN — Medication 20 MILLIGRAM(S): at 05:05

## 2022-10-15 RX ADMIN — MEXILETINE HYDROCHLORIDE 200 MILLIGRAM(S): 150 CAPSULE ORAL at 05:05

## 2022-10-15 RX ADMIN — Medication 25 MILLIGRAM(S): at 05:06

## 2022-10-15 RX ADMIN — Medication 125 MILLIGRAM(S): at 05:04

## 2022-10-15 RX ADMIN — HUMAN INSULIN 22 UNIT(S): 100 INJECTION, SUSPENSION SUBCUTANEOUS at 18:15

## 2022-10-15 RX ADMIN — Medication 125 MICROGRAM(S): at 12:02

## 2022-10-15 RX ADMIN — Medication 0.5 MILLIGRAM(S): at 05:18

## 2022-10-15 RX ADMIN — Medication 2: at 18:14

## 2022-10-15 RX ADMIN — MAGNESIUM OXIDE 400 MG ORAL TABLET 400 MILLIGRAM(S): 241.3 TABLET ORAL at 22:15

## 2022-10-15 RX ADMIN — Medication 1 TABLET(S): at 12:01

## 2022-10-15 RX ADMIN — MEROPENEM 100 MILLIGRAM(S): 1 INJECTION INTRAVENOUS at 13:46

## 2022-10-15 RX ADMIN — MEXILETINE HYDROCHLORIDE 200 MILLIGRAM(S): 150 CAPSULE ORAL at 13:47

## 2022-10-15 RX ADMIN — Medication 37.5 MILLIGRAM(S): at 22:14

## 2022-10-15 RX ADMIN — MEXILETINE HYDROCHLORIDE 200 MILLIGRAM(S): 150 CAPSULE ORAL at 22:15

## 2022-10-15 RX ADMIN — Medication 10 MILLIGRAM(S): at 14:48

## 2022-10-15 RX ADMIN — Medication 100 GRAM(S): at 01:15

## 2022-10-15 RX ADMIN — HUMAN INSULIN 22 UNIT(S): 100 INJECTION, SUSPENSION SUBCUTANEOUS at 00:00

## 2022-10-15 RX ADMIN — HUMAN INSULIN 22 UNIT(S): 100 INJECTION, SUSPENSION SUBCUTANEOUS at 12:07

## 2022-10-15 RX ADMIN — HUMAN INSULIN 22 UNIT(S): 100 INJECTION, SUSPENSION SUBCUTANEOUS at 06:52

## 2022-10-15 RX ADMIN — Medication 25 MILLIGRAM(S): at 05:05

## 2022-10-15 RX ADMIN — Medication 4 MILLILITER(S): at 05:17

## 2022-10-15 RX ADMIN — CHLORHEXIDINE GLUCONATE 15 MILLILITER(S): 213 SOLUTION TOPICAL at 05:06

## 2022-10-15 RX ADMIN — MAGNESIUM OXIDE 400 MG ORAL TABLET 400 MILLIGRAM(S): 241.3 TABLET ORAL at 13:47

## 2022-10-15 RX ADMIN — FLUCONAZOLE 150 MILLIGRAM(S): 150 TABLET ORAL at 20:21

## 2022-10-15 RX ADMIN — Medication 250 MILLIGRAM(S): at 18:07

## 2022-10-15 RX ADMIN — Medication 4: at 12:07

## 2022-10-15 RX ADMIN — Medication 25 MILLIGRAM(S): at 13:47

## 2022-10-15 RX ADMIN — Medication 20 MILLIGRAM(S): at 18:07

## 2022-10-15 RX ADMIN — MEROPENEM 100 MILLIGRAM(S): 1 INJECTION INTRAVENOUS at 05:06

## 2022-10-15 RX ADMIN — PANTOPRAZOLE SODIUM 40 MILLIGRAM(S): 20 TABLET, DELAYED RELEASE ORAL at 12:01

## 2022-10-15 NOTE — PROGRESS NOTE ADULT - SUBJECTIVE AND OBJECTIVE BOX
ENT ISSUE/POD: S/P #8 Portex  Cuffed Tracheostomy POD #5    HPI: 70yo Female s/p #8 Portex Cuffed Tracheostomy POD #5. Pt seen and examined at bedside. No acute events overnight. Doing well on CPAP with no reported issues.         PAST MEDICAL & SURGICAL HISTORY:  Atrial fibrillation  paroxysmal, on eliquis      Diabetes  Type 2      COPD (chronic obstructive pulmonary disease)      Adrenal insufficiency  Medrol daily for over 50 years      Aortic insufficiency  moderate AR on echo 5/3/2018      Pelvic fracture      Asthma      Tracheobronchomalacia  diagnosed 2015, s/p bronchial thermoplasty 2016 (Dr Zapien); recent bronchoscopy 6/5/2018 revealed no evidence of tracheobronchomalacia in trachea or bronchial tubes      Colorectal cancer  4/2018- last treatment , chemo and radiation      Rectal bleeding      Seizure  x 1 1/7/18      DVT (deep venous thrombosis)  15-20 years ago, took coumadin      TIA (transient ischemic attack)  multiple, last 5 years ago - presents as right-sided weakness      History of partial hysterectomy  30 years ago - fibroids      H/O total knee replacement, bilateral  5 years ago      History of sinus surgery  multiple sinus surgeries      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      H/O pelvic surgery  5 years ago - s/p fracture      History of tracheomalacia  2015 - attempted tracheal stenting ([facility])- course complicated by obstruction, respiratory failure, multiple CPR attempts -  stent discontinued; 10/20/2016 Tracheobronchoplasty (Prolene Mesh) performed at [facility] by Dr Zapien      S/P bronchoscopy  6/5/2018 - [facility] (Dr Zapien) no evidence of tracheobronchomalacia in trachea or bronchial tubes      Rectal bleeding  exam under anesthesia ([facility]) 2/2018        Allergies    aspirin (Short breath)  Avelox (Short breath; Pruritus)  cefepime (Anaphylaxis)  codeine (Short breath)  Dilaudid (Short breath)  iodine (Short breath; Swelling)  penicillin (Anaphylaxis)  shellfish (Anaphylaxis)  tetanus toxoid (Short breath)  Valium (Short breath)    Intolerances      MEDICATIONS  (STANDING):  acetylcysteine 10%  Inhalation 4 milliLiter(s) Inhalation every 12 hours  albuterol/ipratropium for Nebulization 3 milliLiter(s) Nebulizer every 6 hours  buDESOnide    Inhalation Suspension 0.5 milliGRAM(s) Inhalation every 12 hours  chlorhexidine 0.12% Liquid 15 milliLiter(s) Oral Mucosa every 12 hours  chlorhexidine 2% Cloths 1 Application(s) Topical <User Schedule>  collagenase Ointment 1 Application(s) Topical daily  dextrose 50% Injectable 50 milliLiter(s) IV Push every 15 minutes  digoxin  Injectable 125 MICROGram(s) IV Push daily  fluconAZOLE IVPB 600 milliGRAM(s) IV Intermittent every 24 hours  furosemide   Injectable 20 milliGRAM(s) IV Push every 12 hours  heparin  Infusion 1050 Unit(s)/Hr (13 mL/Hr) IV Continuous <Continuous>  hydrALAZINE 50 milliGRAM(s) Oral every 8 hours  insulin lispro (ADMELOG) corrective regimen sliding scale   SubCutaneous every 6 hours  insulin NPH human recombinant 22 Unit(s) SubCutaneous every 6 hours  magnesium oxide 400 milliGRAM(s) Oral every 8 hours  meropenem  IVPB 1000 milliGRAM(s) IV Intermittent every 8 hours  methylPREDNISolone 8 milliGRAM(s) Oral daily  metoprolol tartrate 50 milliGRAM(s) Oral every 12 hours  mexiletine 200 milliGRAM(s) Oral every 8 hours  multivitamin 1 Tablet(s) Oral daily  pantoprazole  Injectable 40 milliGRAM(s) IV Push daily  sodium chloride 0.9%. 1000 milliLiter(s) (10 mL/Hr) IV Continuous <Continuous>  vancomycin    Solution 125 milliGRAM(s) Oral every 12 hours  vancomycin  IVPB 750 milliGRAM(s) IV Intermittent every 12 hours  vancomycin  IVPB        MEDICATIONS  (PRN):  acetaminophen    Suspension .. 650 milliGRAM(s) Oral every 6 hours PRN Mild Pain (1 - 3)      Social History: see consult    Family history: see consult    ROS:   ENT: all negative except as noted in HPI   Pulm: denies SOB, cough, hemoptysis  Neuro: denies numbness/tingling, loss of sensation  Endo: denies heat/cold intolerance, excessive sweating      Vital Signs Last 24 Hrs  T(C): 36.2 (15 Oct 2022 12:00), Max: 36.6 (14 Oct 2022 20:00)  T(F): 97.1 (15 Oct 2022 12:00), Max: 97.8 (14 Oct 2022 20:00)  HR: 80 (15 Oct 2022 15:00) (70 - 89)  BP: --  BP(mean): --  RR: 28 (15 Oct 2022 15:00) (20 - 51)  SpO2: 100% (15 Oct 2022 15:00) (95% - 100%)    Parameters below as of 15 Oct 2022 12:09  Patient On (Oxygen Delivery Method): tracheostomy collar                              9.4    14.97 )-----------( 353      ( 15 Oct 2022 00:29 )             31.4    10-15    138  |  102  |  18  ----------------------------<  118<H>  4.0   |  25  |  0.41<L>    Ca    9.3      15 Oct 2022 00:28  Phos  3.2     10-15  Mg     2.0     10-15    TPro  7.7  /  Alb  3.9  /  TBili  0.5  /  DBili  x   /  AST  21  /  ALT  52<H>  /  AlkPhos  185<H>  10-15   PT/INR - ( 15 Oct 2022 00:30 )   PT: 12.9 sec;   INR: 1.12 ratio         PTT - ( 15 Oct 2022 08:08 )  PTT:57.2 sec    PHYSICAL EXAM:  Gen: NAD  Skin: No rashes, bruises, or lesions  Head: Normocephalic, Atraumatic  Face  no erythema, or fluctuance. Parotid glands soft without mass  Eyes: no scleral injection  Nose: Nares bilaterally patent, no discharge  Mouth: No Stridor / Drooling / Trismus.  Mucosa moist, tongue/uvula midline, oropharynx clear  Neck: Portex #8 cuffed trach in place, velcro tie in place  Flat, supple, no lymphadenopathy, trachea midline, no masses  Lymphatic: No lymphadenopathy  Resp: tolerating CPAP  Neuro: facial nerve intact, no facial droop

## 2022-10-15 NOTE — PROGRESS NOTE ADULT - SUBJECTIVE AND OBJECTIVE BOX
Patient seen and examined at the bedside.    Remained critically ill on continuous ICU monitoring.    OBJECTIVE:  Vital Signs Last 24 Hrs  T(C): 36.4 (15 Oct 2022 04:00), Max: 36.6 (14 Oct 2022 16:00)  T(F): 97.5 (15 Oct 2022 04:00), Max: 97.9 (14 Oct 2022 16:00)  HR: 71 (15 Oct 2022 07:00) (66 - 85)  BP: --  BP(mean): --  RR: 26 (15 Oct 2022 07:00) (18 - 51)  SpO2: 100% (15 Oct 2022 07:00) (96% - 100%)    Parameters below as of 15 Oct 2022 04:00  Patient On (Oxygen Delivery Method): ventilator    O2 Concentration (%): 40      Physical Exam:   General: trached  Neurology: awake, minimally interactive, able to follow simple commands  ENT/Neck: Neck supple, trachea midline, No JVD  Respiratory: rhonchi throughout  +trach with minimal secretions  CV: S1S2, no murmurs        [x] Sinus Rhythm   Abdominal: Soft, NT, ND, colostomy in place  Extremities: +4 RUE edema, 3+LUE edema, 2 + pedal edema noted, + peripheral pulses   Skin: Dry dressing over right heel, no Rashes, Hematoma, Ecchymosis , R elbow wound w/ eschar and swelling                        Assessment:  71F PMH DM, COPD, Chronic Adrenal Insufficiency on Chronic prednisone, history of colorectal cancer s/p resection (colostomy bag), Hx of CAD, Chronic A fib on Eliquis, and tracheomalacia and multiple intubations, recent dx of OM presented to ED at [facility] this morning with epigastric pain, belching and central chest pain. Found on CTA to have type A aortic dissection and transferred to [facility] for surgical evaluation by Dr. Cabrera.     Ascending aortic dissection s/p type A dissection repair and biobentall on 9/7   Respiratory failure s/p Trach 10/10/22  Hypovolemia   Post op respiratory failure   Acute blood loss anemia  Stress hyperglycemia   Leukocytosis   RIJ thrombus  MRSA PNA          Plan:   ***Neuro***  Off sedation, follows simple commands, continue to monitor  PT/OT progress as tolerated      ***Cardiovascular***  Limited TTE on 10/1: EF 69%, Hyperdynamic left ventricular systolic function. There is hypokinesis of the mid-base inferior wall. Nml RV fxn.   CT chest on 9/28: No CT evidence of pulmonary embolism. Status post repair of ascending aortic dissection with a stable dissection flap originating from the aortic arch and extending into the infrarenal abdominal aorta,  B/l UE duplex on 10/5: As before, there is an occluded RIGHT IJ vein with thrombosis extending to brachiocephalic vein. Superficial thrombophlebitis of the LEFT cephalic vein.  Invasive hemodynamic monitoring, assess perfusion indices   SR / CVP -7/ MAP 72/ Hct 31.4/ Lactate 1.0  [x] Hydralazine PO for BP managment, HR 55-65 so unable to uptitrate lopressor  Rate control with Mexiletine, Digoxin, and Lopressor   [x] AC Therapy with Heparin   Reassessment of hemodynamics  Serial EKG and cardiac enzymes       ***Pulmonary***  Respiratory failure s/p Trach #8 portex  10/10/22, per ENT inner cannula needs to be changed daily, sutures can be DC day 7 (10/17)  Post op vent management   Titration of FiO2 and PEEP, follow SpO2, CXR, blood gasses   Continue bronchodilators duoneb, pulmacort  yesterday morning tachypnea with PS, high peak pressures on AC/ dyschronous with vent, low dose precedex started (now d/c), and changed to PC ventilation with improved compliance-> bronch BAL pending  Unable to tolerated vent weaning 10/12  CPAP/TC trials as tolerated 10/13        Mode: CPAP with PS  FiO2: 40  PEEP: 5  MAP: 10  PC: 12  PIP: 18              ***GI***  [x] Diet:  TFs, Glucerna 1.5 @ 55cc/hr   [x] Protonix         ***Renal***  Continue to monitor I/Os, BUN/Creatinine.   Replete lytes PRN  Seven present   Diuresis with Lasix       ***ID***  SCx on 10/4+Methicillin resistant Staphylococcus aureus, c/w IVPB Vancomycin, next trough 4am 10/11 (plan for 6 week course in setting of valve surgery, ?11/8 end date)  9/27 blood culture Neela Glabarata, on flucanazole (lifelong suppression)  PO Vancomycin solution for C.diff prophylaxis   R elbow wound w/ eschar-> wound team evaluated, ortho signed off (neg xrays, not likely septic arthritis)  10/12 with rising WBC, central line changed, ID re-consulted and meropenum reinitiated.  BCx 10/13 NG  SCx Right Elbow Abscess 10/13 + Few Proteus mirabilis   Wound re-consulted 10/12 for further managment US soft tissue ordered yesterday to r/o abcess/fluid collection-->  down to IR for elbow drainage 10/13  Will follow up ID recs/ cultures      ***Endocrine***  [x]  DM : HbA1c 9.4%                - [x] ISS  [x] NPH              - Need tight glycemic control to prevent wound infection.            Patient requires continuous monitoring with bedside rhythm monitoring, pulse oximetry monitoring, and continuous central venous and arterial pressure monitoring; and intermittent blood gas analysis. Care plan discussed with the ICU care team.   Patient remained critical, at risk for life threatening decompensation.    I have spent 30 minutes providing critical care management to this patient.    By signing my name below, I, Maria D'Amico, attest that this documentation has been prepared under the direction and in the presence of KIANA West   Electronically signed: Maria D'Amico, Scribe, 10-15-22 @ 07:07    I, KIANA West , personally performed the services described in this documentation. all medical record entries made by the marcyibe were at my direction and in my presence. I have reviewed the chart and agree that the record reflects my personal performance and is accurate and complete  Electronically signed: KIANA West  Patient seen and examined at the bedside.    Remained critically ill on continuous ICU monitoring.    OBJECTIVE:  Vital Signs Last 24 Hrs  T(C): 36.4 (15 Oct 2022 04:00), Max: 36.6 (14 Oct 2022 16:00)  T(F): 97.5 (15 Oct 2022 04:00), Max: 97.9 (14 Oct 2022 16:00)  HR: 71 (15 Oct 2022 07:00) (66 - 85)  BP: --  BP(mean): --  RR: 26 (15 Oct 2022 07:00) (18 - 51)  SpO2: 100% (15 Oct 2022 07:00) (96% - 100%)    Parameters below as of 15 Oct 2022 04:00  Patient On (Oxygen Delivery Method): ventilator    O2 Concentration (%): 40      Physical Exam:   General: OOB to chair this AM, NAD  Neurology: interactive, able to follow simple commands  ENT/Neck: + trach c/d/i, neck supple, trachea midline   Respiratory: coarse BS b/l, rhonchi throughout. + Coughing thick secretions  CV: S1S2, no murmurs        [x] Sinus Rhythm   Abdominal: Soft, NT, ND, colostomy in place  Extremities: +4 RUE edema, 3+LUE edema, trace edema noted, + peripheral pulses   Skin: Dry dressing over right heel, R elbow wound w/ overlying cling dressing c/d/i

## 2022-10-15 NOTE — PROGRESS NOTE ADULT - ASSESSMENT
70yo Female s/p #8 Portex Cuffed Tracheostomy POD #5. Pt doing well on CPAP with no reported issues.

## 2022-10-15 NOTE — PROGRESS NOTE ADULT - ASSESSMENT
71F PMH DM, COPD, chronic adrenal insufficiency on Prednisone, history of colorectal cancer s/p resection (colostomy bag), Hx of CAD, chronic AF on Eliquis, and tracheomalacia s/p multiple intubations, recent dx of R foot OM s/p debridement on antibiotics and presented to ED at [facility] this morning with epigastric pain, belching and central chest pain. Found on CTA to have type A aortic dissection and transferred to [facility] for surgical evaluation by Dr. Cabrera.     Ascending aortic dissection s/p type A dissection repair and Biobentall on 9/7   Respiratory failure s/p Trach 10/10/22  Hypovolemia   Post op respiratory failure   Acute blood loss anemia  Stress hyperglycemia   Leukocytosis   RIJ thrombus  MRSA PNA    Plan:   ***Neuro***  Off sedation, grossly nonfocal, follows simple commands, continue to monitor  PT/OT as tolerated      ***Cardiovascular***  Limited TTE on 10/1: EF 69%, hyperdynamic LV systolic function, normal RV fxn, no AI   CT chest on 9/28: No CT evidence of pulmonary embolism. Status post repair of ascending aortic dissection with a stable dissection flap originating from the aortic arch and extending into the infrarenal abdominal aorta,  B/l UE duplex on 10/5: As before, there is an occluded RIGHT IJ vein with thrombosis extending to brachiocephalic vein. Superficial thrombophlebitis of the LEFT cephalic vein   On Heparin gtt, therapeutic x 2 today (ptt goal 50-60)  Invasive hemodynamic monitoring, assess perfusion indices   [x] Hydralazine PO for BP management, uptitrate prn   Rate control with Mexiletine, Digoxin, and Lopressor, uptitrate as HR allow       ***Pulmonary***  Respiratory failure s/p Trach #8 Portex  10/10/22, per ENT inner cannula needs to be changed daily, sutures can be DC day 7 (10/17)  Post op vent management   Titration of FiO2 and PEEP, follow SpO2, daily CXR, blood gases prn   Continue bronchodilators   TC w/ vent rest as tolerated   Bag and suction prn         Mode: CPAP with PS  FiO2: 40  PEEP: 5  MAP: 10  PC: 12  PIP: 18              ***GI***  [x] Diet:  TFs, Glucerna 1.5 @ 55cc/hr   [x] Protonix         ***Renal***  Continue to monitor I/Os, BUN/Creatinine.   Replete lytes PRN   Seven present   Diuresis with Lasix 20IV BID   Overall net negative/3 days       ***ID***  SCx on 10/4+Methicillin resistant Staphylococcus aureus, c/w IVPB Vancomycin, next trough 4am 10/11 (plan for 6 week course in setting of valve surgery, ?11/8 end date)  9/27 BCx + Candida glabrata, on Fluconazole (lifelong suppression)  PO Vancomycin solution for C. diff prophylaxis   R elbow wound w/ eschar s/p wound team eval, Ortho eval and signed off 2/2 negative x-rays, not likely septic arthritis  10/12 with rising WBC, lines changed, ID re-consulted and Vanc by level and Meropenem reinitiated. 10/12 BCX NG x3, SC NG   BCx 10/13 NG, Right Elbow Abscess 10/13 + Few Proteus mirabilis   Wound re-consulted 10/12 for further management US soft tissue ordered yesterday to r/o abcess/fluid collection -->  down to IR for elbow drainage 10/13  Will follow up ID recs/cultures      ***Endocrine***  [x]  DM : HbA1c 9.4%                - [x] ISS  [x] NPH              - Need tight glycemic control to prevent wound infection.            Patient requires continuous monitoring with bedside rhythm monitoring, pulse oximetry monitoring, and continuous central venous and arterial pressure monitoring; and intermittent blood gas analysis. Care plan discussed with the ICU care team.   Patient remained critical, at risk for life threatening decompensation.    I have spent 30 minutes providing critical care management to this patient.    By signing my name below, I, Maria D'Amico, attest that this documentation has been prepared under the direction and in the presence of KIANA West   Electronically signed: Maria D'Amico, Scribe, 10-15-22 @ 07:07    I, KIANA West , personally performed the services described in this documentation. all medical record entries made by the rogers were at my direction and in my presence. I have reviewed the chart and agree that the record reflects my personal performance and is accurate and complete  Electronically signed: KIANA West  71F PMH DM, COPD, chronic adrenal insufficiency on Prednisone, history of colorectal cancer s/p resection (colostomy bag), Hx of CAD, chronic AF on Eliquis, and tracheomalacia s/p multiple intubations, recent dx of R foot OM s/p debridement on antibiotics and presented to ED at [facility] this morning with epigastric pain, belching and central chest pain. Found on CTA to have type A aortic dissection and transferred to [facility] for surgical evaluation by Dr. Cabrera.     Ascending aortic dissection s/p type A dissection repair and Biobentall on 9/7   Respiratory failure s/p Trach 10/10/22  Hypovolemia   Post op respiratory failure   Acute blood loss anemia  Stress hyperglycemia   Leukocytosis   RIJ thrombus  MRSA PNA    Plan:   ***Neuro***  Off sedation, grossly nonfocal, follows simple commands, continue to monitor  PT/OT as tolerated      ***Cardiovascular***  Limited TTE on 10/1: EF 69%, hyperdynamic LV systolic function, normal RV fxn, no AI   CT chest on 9/28: No CT evidence of pulmonary embolism. Status post repair of ascending aortic dissection with a stable dissection flap originating from the aortic arch and extending into the infrarenal abdominal aorta,  B/l UE duplex on 10/5: As before, there is an occluded RIGHT IJ vein with thrombosis extending to brachiocephalic vein. Superficial thrombophlebitis of the LEFT cephalic vein   On Heparin gtt, therapeutic x 2 today (ptt goal 50-60)  Invasive hemodynamic monitoring, assess perfusion indices   [x] Hydralazine PO for BP management, uptitrate prn   Rate control with Mexiletine, Digoxin, and Lopressor, uptitrate as HR allow       ***Pulmonary***  Respiratory failure s/p Trach #8 Portex  10/10/22, per ENT inner cannula needs to be changed daily, sutures can be DC day 7 (10/17)  Post op vent management   Titration of FiO2 and PEEP, follow SpO2, daily CXR, blood gases prn   Continue bronchodilators   TC w/ vent rest as tolerated   Bag and suction prn         Mode: CPAP with PS  FiO2: 40  PEEP: 5  MAP: 10  PC: 12  PIP: 18              ***GI***  [x] Diet:  TFs, Glucerna 1.5 @ 55cc/hr   [x] Protonix         ***Renal***  Continue to monitor I/Os, BUN/Creatinine.   Replete lytes PRN   Seven present   Diuresis with Lasix 20IV BID   Overall net negative/3 days       ***ID***  MRSA bacteremia and candidemia   SCx on 10/4+Methicillin resistant Staphylococcus aureus, c/w IVPB Vancomycin, next trough 4am 10/11 (plan for 6 week course in setting of valve surgery, ?11/8 end date)  9/27 BCx + Candida glabrata, on Fluconazole   PO Vancomycin solution for C. diff prophylaxis   R elbow wound w/ eschar s/p wound team eval, Ortho eval and signed off 2/2 negative x-rays, not likely septic arthritis  10/12 with rising WBC, lines changed, ID re-consulted and Vanc by level and Meropenem reinitiated. 10/12 BCX NG x3, SC NG   BCx 10/13 NG, Right Elbow Abscess 10/13 + Few Proteus mirabilis   Wound re-consulted 10/12 for further management US soft tissue ordered yesterday to r/o abcess/fluid collection -->  down to IR for elbow drainage 10/13  Will follow up ID recs/cultures      ***Endocrine***  [x]  DM : HbA1c 9.4%                - [x] ISS  [x] NPH              - Need tight glycemic control to prevent wound infection.      Patient requires continuous monitoring with bedside rhythm monitoring, pulse oximetry monitoring, and continuous central venous and arterial pressure monitoring; and intermittent blood gas analysis. Care plan discussed with the ICU care team.   Patient remained critical, at risk for life threatening decompensation.    I have spent 30 minutes providing critical care management to this patient.    By signing my name below, I, Maria D'Amico, attest that this documentation has been prepared under the direction and in the presence of KIANA West   Electronically signed: Maria D'Amico, Scribe, 10-15-22 @ 07:07    I, KIANA West , personally performed the services described in this documentation. all medical record entries made by the rogers were at my direction and in my presence. I have reviewed the chart and agree that the record reflects my personal performance and is accurate and complete  Electronically signed: KIANA West

## 2022-10-16 LAB
ALBUMIN SERPL ELPH-MCNC: 3.6 G/DL — SIGNIFICANT CHANGE UP (ref 3.3–5)
ALP SERPL-CCNC: 180 U/L — HIGH (ref 40–120)
ALT FLD-CCNC: 42 U/L — SIGNIFICANT CHANGE UP (ref 10–45)
ANION GAP SERPL CALC-SCNC: 13 MMOL/L — SIGNIFICANT CHANGE UP (ref 5–17)
APTT BLD: 60.5 SEC — HIGH (ref 27.5–35.5)
APTT BLD: 60.6 SEC — HIGH (ref 27.5–35.5)
APTT BLD: 68.4 SEC — HIGH (ref 27.5–35.5)
APTT BLD: 72 SEC — HIGH (ref 27.5–35.5)
AST SERPL-CCNC: 18 U/L — SIGNIFICANT CHANGE UP (ref 10–40)
BILIRUB SERPL-MCNC: 0.4 MG/DL — SIGNIFICANT CHANGE UP (ref 0.2–1.2)
BUN SERPL-MCNC: 23 MG/DL — SIGNIFICANT CHANGE UP (ref 7–23)
CALCIUM SERPL-MCNC: 9.8 MG/DL — SIGNIFICANT CHANGE UP (ref 8.4–10.5)
CHLORIDE SERPL-SCNC: 100 MMOL/L — SIGNIFICANT CHANGE UP (ref 96–108)
CO2 SERPL-SCNC: 25 MMOL/L — SIGNIFICANT CHANGE UP (ref 22–31)
CREAT SERPL-MCNC: 0.46 MG/DL — LOW (ref 0.5–1.3)
DIGOXIN SERPL-MCNC: 0.8 NG/ML — SIGNIFICANT CHANGE UP (ref 0.8–2)
EGFR: 100 ML/MIN/1.73M2 — SIGNIFICANT CHANGE UP
GAS PNL BLDA: SIGNIFICANT CHANGE UP
GAS PNL BLDA: SIGNIFICANT CHANGE UP
GLUCOSE BLDC GLUCOMTR-MCNC: 117 MG/DL — HIGH (ref 70–99)
GLUCOSE BLDC GLUCOMTR-MCNC: 138 MG/DL — HIGH (ref 70–99)
GLUCOSE BLDC GLUCOMTR-MCNC: 154 MG/DL — HIGH (ref 70–99)
GLUCOSE BLDC GLUCOMTR-MCNC: 89 MG/DL — SIGNIFICANT CHANGE UP (ref 70–99)
GLUCOSE SERPL-MCNC: 156 MG/DL — HIGH (ref 70–99)
HCT VFR BLD CALC: 34.6 % — SIGNIFICANT CHANGE UP (ref 34.5–45)
HGB BLD-MCNC: 10.1 G/DL — LOW (ref 11.5–15.5)
MAGNESIUM SERPL-MCNC: 2 MG/DL — SIGNIFICANT CHANGE UP (ref 1.6–2.6)
MCHC RBC-ENTMCNC: 23.4 PG — LOW (ref 27–34)
MCHC RBC-ENTMCNC: 29.2 GM/DL — LOW (ref 32–36)
MCV RBC AUTO: 80.1 FL — SIGNIFICANT CHANGE UP (ref 80–100)
NRBC # BLD: 3 /100 WBCS — HIGH (ref 0–0)
PHOSPHATE SERPL-MCNC: 2.7 MG/DL — SIGNIFICANT CHANGE UP (ref 2.5–4.5)
PLATELET # BLD AUTO: 344 K/UL — SIGNIFICANT CHANGE UP (ref 150–400)
POTASSIUM SERPL-MCNC: 3.8 MMOL/L — SIGNIFICANT CHANGE UP (ref 3.5–5.3)
POTASSIUM SERPL-SCNC: 3.8 MMOL/L — SIGNIFICANT CHANGE UP (ref 3.5–5.3)
PROT SERPL-MCNC: 8 G/DL — SIGNIFICANT CHANGE UP (ref 6–8.3)
RBC # BLD: 4.32 M/UL — SIGNIFICANT CHANGE UP (ref 3.8–5.2)
RBC # FLD: 24.4 % — HIGH (ref 10.3–14.5)
SODIUM SERPL-SCNC: 138 MMOL/L — SIGNIFICANT CHANGE UP (ref 135–145)
VANCOMYCIN TROUGH SERPL-MCNC: 14.3 UG/ML — SIGNIFICANT CHANGE UP (ref 10–20)
VANCOMYCIN TROUGH SERPL-MCNC: 14.9 UG/ML — SIGNIFICANT CHANGE UP (ref 10–20)
WBC # BLD: 12.67 K/UL — HIGH (ref 3.8–10.5)
WBC # FLD AUTO: 12.67 K/UL — HIGH (ref 3.8–10.5)

## 2022-10-16 PROCEDURE — 99291 CRITICAL CARE FIRST HOUR: CPT | Mod: 24

## 2022-10-16 PROCEDURE — 99233 SBSQ HOSP IP/OBS HIGH 50: CPT

## 2022-10-16 PROCEDURE — 93010 ELECTROCARDIOGRAM REPORT: CPT

## 2022-10-16 PROCEDURE — 99231 SBSQ HOSP IP/OBS SF/LOW 25: CPT

## 2022-10-16 PROCEDURE — 71045 X-RAY EXAM CHEST 1 VIEW: CPT | Mod: 26,76

## 2022-10-16 RX ORDER — POTASSIUM CHLORIDE 20 MEQ
10 PACKET (EA) ORAL
Refills: 0 | Status: COMPLETED | OUTPATIENT
Start: 2022-10-16 | End: 2022-10-16

## 2022-10-16 RX ORDER — ACETAMINOPHEN 500 MG
650 TABLET ORAL EVERY 6 HOURS
Refills: 0 | Status: DISCONTINUED | OUTPATIENT
Start: 2022-10-16 | End: 2022-11-22

## 2022-10-16 RX ORDER — MAGNESIUM SULFATE 500 MG/ML
1 VIAL (ML) INJECTION ONCE
Refills: 0 | Status: COMPLETED | OUTPATIENT
Start: 2022-10-16 | End: 2022-10-16

## 2022-10-16 RX ORDER — METOPROLOL TARTRATE 50 MG
25 TABLET ORAL
Refills: 0 | Status: DISCONTINUED | OUTPATIENT
Start: 2022-10-16 | End: 2022-10-16

## 2022-10-16 RX ADMIN — Medication 3 MILLILITER(S): at 17:27

## 2022-10-16 RX ADMIN — Medication 650 MILLIGRAM(S): at 09:15

## 2022-10-16 RX ADMIN — Medication 3 MILLILITER(S): at 00:45

## 2022-10-16 RX ADMIN — MEROPENEM 100 MILLIGRAM(S): 1 INJECTION INTRAVENOUS at 22:19

## 2022-10-16 RX ADMIN — Medication 125 MILLIGRAM(S): at 17:27

## 2022-10-16 RX ADMIN — Medication 50 MILLIEQUIVALENT(S): at 05:33

## 2022-10-16 RX ADMIN — CHLORHEXIDINE GLUCONATE 15 MILLILITER(S): 213 SOLUTION TOPICAL at 17:27

## 2022-10-16 RX ADMIN — Medication 125 MILLIGRAM(S): at 05:52

## 2022-10-16 RX ADMIN — Medication 4 MILLILITER(S): at 05:44

## 2022-10-16 RX ADMIN — Medication 1 TABLET(S): at 12:14

## 2022-10-16 RX ADMIN — MEXILETINE HYDROCHLORIDE 200 MILLIGRAM(S): 150 CAPSULE ORAL at 13:09

## 2022-10-16 RX ADMIN — SODIUM CHLORIDE 10 MILLILITER(S): 9 INJECTION INTRAMUSCULAR; INTRAVENOUS; SUBCUTANEOUS at 20:00

## 2022-10-16 RX ADMIN — Medication 37.5 MILLIGRAM(S): at 13:09

## 2022-10-16 RX ADMIN — MEROPENEM 100 MILLIGRAM(S): 1 INJECTION INTRAVENOUS at 05:32

## 2022-10-16 RX ADMIN — Medication 50 MILLIGRAM(S): at 05:53

## 2022-10-16 RX ADMIN — Medication 0.5 MILLIGRAM(S): at 17:27

## 2022-10-16 RX ADMIN — Medication 20 MILLIGRAM(S): at 17:27

## 2022-10-16 RX ADMIN — Medication 650 MILLIGRAM(S): at 09:45

## 2022-10-16 RX ADMIN — CHLORHEXIDINE GLUCONATE 15 MILLILITER(S): 213 SOLUTION TOPICAL at 05:55

## 2022-10-16 RX ADMIN — HUMAN INSULIN 22 UNIT(S): 100 INJECTION, SUSPENSION SUBCUTANEOUS at 12:14

## 2022-10-16 RX ADMIN — HUMAN INSULIN 22 UNIT(S): 100 INJECTION, SUSPENSION SUBCUTANEOUS at 05:49

## 2022-10-16 RX ADMIN — MAGNESIUM OXIDE 400 MG ORAL TABLET 400 MILLIGRAM(S): 241.3 TABLET ORAL at 13:09

## 2022-10-16 RX ADMIN — MEXILETINE HYDROCHLORIDE 200 MILLIGRAM(S): 150 CAPSULE ORAL at 05:53

## 2022-10-16 RX ADMIN — MAGNESIUM OXIDE 400 MG ORAL TABLET 400 MILLIGRAM(S): 241.3 TABLET ORAL at 05:54

## 2022-10-16 RX ADMIN — FLUCONAZOLE 150 MILLIGRAM(S): 150 TABLET ORAL at 21:07

## 2022-10-16 RX ADMIN — Medication 50 MILLIEQUIVALENT(S): at 08:40

## 2022-10-16 RX ADMIN — Medication 4 MILLILITER(S): at 17:26

## 2022-10-16 RX ADMIN — Medication 3 MILLILITER(S): at 05:42

## 2022-10-16 RX ADMIN — Medication 0.5 MILLIGRAM(S): at 05:43

## 2022-10-16 RX ADMIN — PANTOPRAZOLE SODIUM 40 MILLIGRAM(S): 20 TABLET, DELAYED RELEASE ORAL at 12:14

## 2022-10-16 RX ADMIN — HUMAN INSULIN 22 UNIT(S): 100 INJECTION, SUSPENSION SUBCUTANEOUS at 17:29

## 2022-10-16 RX ADMIN — CHLORHEXIDINE GLUCONATE 1 APPLICATION(S): 213 SOLUTION TOPICAL at 05:55

## 2022-10-16 RX ADMIN — HEPARIN SODIUM 11 UNIT(S)/HR: 5000 INJECTION INTRAVENOUS; SUBCUTANEOUS at 20:00

## 2022-10-16 RX ADMIN — Medication 100 GRAM(S): at 06:38

## 2022-10-16 RX ADMIN — Medication 300 MILLIGRAM(S): at 17:27

## 2022-10-16 RX ADMIN — Medication 37.5 MILLIGRAM(S): at 05:32

## 2022-10-16 RX ADMIN — MAGNESIUM OXIDE 400 MG ORAL TABLET 400 MILLIGRAM(S): 241.3 TABLET ORAL at 21:06

## 2022-10-16 RX ADMIN — Medication 37.5 MILLIGRAM(S): at 21:06

## 2022-10-16 RX ADMIN — MEROPENEM 100 MILLIGRAM(S): 1 INJECTION INTRAVENOUS at 13:09

## 2022-10-16 RX ADMIN — Medication 1 APPLICATION(S): at 12:08

## 2022-10-16 RX ADMIN — MEXILETINE HYDROCHLORIDE 200 MILLIGRAM(S): 150 CAPSULE ORAL at 21:06

## 2022-10-16 RX ADMIN — Medication 250 MILLIGRAM(S): at 20:15

## 2022-10-16 RX ADMIN — Medication 2: at 05:34

## 2022-10-16 RX ADMIN — Medication 20 MILLIGRAM(S): at 05:52

## 2022-10-16 RX ADMIN — Medication 300 MILLIGRAM(S): at 05:43

## 2022-10-16 RX ADMIN — Medication 50 MILLIEQUIVALENT(S): at 05:41

## 2022-10-16 RX ADMIN — HUMAN INSULIN 22 UNIT(S): 100 INJECTION, SUSPENSION SUBCUTANEOUS at 00:08

## 2022-10-16 RX ADMIN — Medication 8 MILLIGRAM(S): at 05:53

## 2022-10-16 RX ADMIN — Medication 3 MILLILITER(S): at 11:18

## 2022-10-16 RX ADMIN — Medication 250 MILLIGRAM(S): at 07:50

## 2022-10-16 NOTE — PROGRESS NOTE ADULT - SUBJECTIVE AND OBJECTIVE BOX
ENT ISSUE/POD: S/P #8 Portex Cuffed Tracheostomy POD #6    HPI: 72yo Female s/p #8 Portex Cuffed Tracheostomy POD #6. Pt seen and examined at bedside. No acute events overnight. Doing well on trach collar with no reported issues.         PAST MEDICAL & SURGICAL HISTORY:  Atrial fibrillation  paroxysmal, on eliquis      Diabetes  Type 2      COPD (chronic obstructive pulmonary disease)      Adrenal insufficiency  Medrol daily for over 50 years      Aortic insufficiency  moderate AR on echo 5/3/2018      Pelvic fracture      Asthma      Tracheobronchomalacia  diagnosed 2015, s/p bronchial thermoplasty 2016 (Dr Zapien); recent bronchoscopy 6/5/2018 revealed no evidence of tracheobronchomalacia in trachea or bronchial tubes      Colorectal cancer  4/2018- last treatment , chemo and radiation      Rectal bleeding      Seizure  x 1 1/7/18      DVT (deep venous thrombosis)  15-20 years ago, took coumadin      TIA (transient ischemic attack)  multiple, last 5 years ago - presents as right-sided weakness      History of partial hysterectomy  30 years ago - fibroids      H/O total knee replacement, bilateral  5 years ago      History of sinus surgery  multiple sinus surgeries      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      H/O pelvic surgery  5 years ago - s/p fracture      History of tracheomalacia  2015 - attempted tracheal stenting ([facility])- course complicated by obstruction, respiratory failure, multiple CPR attempts -  stent discontinued; 10/20/2016 Tracheobronchoplasty (Prolene Mesh) performed at [facility] by Dr Zapien      S/P bronchoscopy  6/5/2018 - [place] (Dr Zapien) no evidence of tracheobronchomalacia in trachea or bronchial tubes      Rectal bleeding  exam under anesthesia ([facility]) 2/2018        Allergies    aspirin (Short breath)  Avelox (Short breath; Pruritus)  cefepime (Anaphylaxis)  codeine (Short breath)  Dilaudid (Short breath)  iodine (Short breath; Swelling)  penicillin (Anaphylaxis)  shellfish (Anaphylaxis)  tetanus toxoid (Short breath)  Valium (Short breath)    Intolerances      MEDICATIONS  (STANDING):  acetylcysteine 10%  Inhalation 4 milliLiter(s) Inhalation every 12 hours  albuterol/ipratropium for Nebulization 3 milliLiter(s) Nebulizer every 6 hours  buDESOnide    Inhalation Suspension 0.5 milliGRAM(s) Inhalation every 12 hours  chlorhexidine 0.12% Liquid 15 milliLiter(s) Oral Mucosa every 12 hours  chlorhexidine 2% Cloths 1 Application(s) Topical <User Schedule>  collagenase Ointment 1 Application(s) Topical daily  dextrose 50% Injectable 50 milliLiter(s) IV Push every 15 minutes  digoxin  Injectable 125 MICROGram(s) IV Push daily  fluconAZOLE IVPB 600 milliGRAM(s) IV Intermittent every 24 hours  furosemide   Injectable 20 milliGRAM(s) IV Push every 12 hours  heparin  Infusion 1050 Unit(s)/Hr (11 mL/Hr) IV Continuous <Continuous>  hydrALAZINE 37.5 milliGRAM(s) Oral every 8 hours  insulin lispro (ADMELOG) corrective regimen sliding scale   SubCutaneous every 6 hours  insulin NPH human recombinant 22 Unit(s) SubCutaneous every 6 hours  magnesium oxide 400 milliGRAM(s) Oral every 8 hours  meropenem  IVPB 1000 milliGRAM(s) IV Intermittent every 8 hours  methylPREDNISolone 8 milliGRAM(s) Oral daily  metoprolol tartrate 50 milliGRAM(s) Oral every 12 hours  mexiletine 200 milliGRAM(s) Oral every 8 hours  multivitamin 1 Tablet(s) Oral daily  pantoprazole  Injectable 40 milliGRAM(s) IV Push daily  potassium chloride  10 mEq/50 mL IVPB 10 milliEquivalent(s) IV Intermittent every 1 hour  sodium chloride 0.9%. 1000 milliLiter(s) (10 mL/Hr) IV Continuous <Continuous>  tobramycin for Nebulization 300 milliGRAM(s) Inhalation every 12 hours  vancomycin    Solution 125 milliGRAM(s) Oral every 12 hours  vancomycin  IVPB 750 milliGRAM(s) IV Intermittent every 12 hours  vancomycin  IVPB        MEDICATIONS  (PRN):  acetaminophen    Suspension .. 650 milliGRAM(s) Oral every 6 hours PRN Temp greater or equal to 38C (100.4F), Mild Pain (1 - 3)      Social History: see consult    Family history: see consult    ROS:   ENT: all negative except as noted in HPI   Pulm: denies SOB, cough, hemoptysis  Neuro: denies numbness/tingling, loss of sensation  Endo: denies heat/cold intolerance, excessive sweating      Vital Signs Last 24 Hrs  T(C): 36.5 (16 Oct 2022 00:00), Max: 36.5 (16 Oct 2022 00:00)  T(F): 97.7 (16 Oct 2022 00:00), Max: 97.7 (16 Oct 2022 00:00)  HR: 67 (16 Oct 2022 07:00) (67 - 89)  BP: --  BP(mean): --  RR: 29 (16 Oct 2022 07:00) (16 - 35)  SpO2: 100% (16 Oct 2022 07:00) (95% - 100%)    Parameters below as of 16 Oct 2022 08:00  Patient On (Oxygen Delivery Method): ventilator    O2 Concentration (%): 40                          10.1   12.67 )-----------( 344      ( 16 Oct 2022 00:41 )             34.6    10-16    138  |  100  |  23  ----------------------------<  156<H>  3.8   |  25  |  0.46<L>    Ca    9.8      16 Oct 2022 00:41  Phos  2.7     10-16  Mg     2.0     10-16    TPro  8.0  /  Alb  3.6  /  TBili  0.4  /  DBili  x   /  AST  18  /  ALT  42  /  AlkPhos  180<H>  10-16   PT/INR - ( 15 Oct 2022 00:30 )   PT: 12.9 sec;   INR: 1.12 ratio         PTT - ( 16 Oct 2022 06:21 )  PTT:72.0 sec    PHYSICAL EXAM:  Gen: NAD  Skin: No rashes, bruises, or lesions  Head: Normocephalic, Atraumatic  Face  no erythema, or fluctuance. Parotid glands soft without mass  Eyes: no scleral injection  Nose: Nares bilaterally patent, no discharge  Mouth: No Stridor / Drooling / Trismus.  Mucosa moist, tongue/uvula midline, oropharynx clear  Neck: Portex #8 cuffed trach in place, velcro tie in place. Flat, supple, no lymphadenopathy, trachea midline, no masses  Lymphatic: No lymphadenopathy  Resp: tolerating TC  Neuro: facial nerve intact, no facial droop

## 2022-10-16 NOTE — PROGRESS NOTE ADULT - SUBJECTIVE AND OBJECTIVE BOX
CHIEF COMPLAINT:     Interval Events:    REVIEW OF SYSTEMS:  Constitutional:   Eyes:  ENT:  CV:  Resp:  GI:  :  MSK:  Integumentary:  Neurological:  Psychiatric:  Endocrine:  Hematologic/Lymphatic:  Allergic/Immunologic:  [ ] All other systems negative  [ ] Unable to assess ROS because ________    OBJECTIVE:  ICU Vital Signs Last 24 Hrs  T(C): 37.1 (16 Oct 2022 12:00), Max: 37.1 (16 Oct 2022 12:00)  T(F): 98.8 (16 Oct 2022 12:00), Max: 98.8 (16 Oct 2022 12:00)  HR: 51 (16 Oct 2022 15:18) (47 - 88)  BP: --  BP(mean): --  ABP: 146/54 (16 Oct 2022 15:00) (109/44 - 146/57)  ABP(mean): 79 (16 Oct 2022 15:00) (62 - 92)  RR: 22 (16 Oct 2022 15:00) (16 - 38)  SpO2: 98% (16 Oct 2022 15:18) (96% - 100%)    O2 Parameters below as of 16 Oct 2022 11:22  Patient On (Oxygen Delivery Method): tracheostomy collar          Mode: standby,trach collar    10-15 @ 07:01  -  10-16 @ 07:00  --------------------------------------------------------  IN: 2574.5 mL / OUT: 2965 mL / NET: -390.5 mL    10-16 @ 07:01  -  10-16 @ 15:56  --------------------------------------------------------  IN: 929 mL / OUT: 665 mL / NET: 264 mL      CAPILLARY BLOOD GLUCOSE  212 (14 Oct 2022 18:00)      POCT Blood Glucose.: 89 mg/dL (16 Oct 2022 11:45)      PHYSICAL EXAM:  General:   HEENT:   Lymph Nodes:  Neck:   Respiratory:   Cardiovascular:   Abdomen:   Extremities:   Skin:   Neurological:  Psychiatry:    HOSPITAL MEDICATIONS:  MEDICATIONS  (STANDING):  acetylcysteine 10%  Inhalation 4 milliLiter(s) Inhalation every 12 hours  albuterol/ipratropium for Nebulization 3 milliLiter(s) Nebulizer every 6 hours  buDESOnide    Inhalation Suspension 0.5 milliGRAM(s) Inhalation every 12 hours  chlorhexidine 0.12% Liquid 15 milliLiter(s) Oral Mucosa every 12 hours  chlorhexidine 2% Cloths 1 Application(s) Topical <User Schedule>  collagenase Ointment 1 Application(s) Topical daily  dextrose 50% Injectable 50 milliLiter(s) IV Push every 15 minutes  fluconAZOLE IVPB 600 milliGRAM(s) IV Intermittent every 24 hours  furosemide   Injectable 20 milliGRAM(s) IV Push every 12 hours  heparin  Infusion 1050 Unit(s)/Hr (11 mL/Hr) IV Continuous <Continuous>  hydrALAZINE 37.5 milliGRAM(s) Oral every 8 hours  insulin lispro (ADMELOG) corrective regimen sliding scale   SubCutaneous every 6 hours  insulin NPH human recombinant 22 Unit(s) SubCutaneous every 6 hours  magnesium oxide 400 milliGRAM(s) Oral every 8 hours  meropenem  IVPB 1000 milliGRAM(s) IV Intermittent every 8 hours  methylPREDNISolone 8 milliGRAM(s) Oral daily  mexiletine 200 milliGRAM(s) Oral every 8 hours  multivitamin 1 Tablet(s) Oral daily  pantoprazole  Injectable 40 milliGRAM(s) IV Push daily  sodium chloride 0.9%. 1000 milliLiter(s) (10 mL/Hr) IV Continuous <Continuous>  tobramycin for Nebulization 300 milliGRAM(s) Inhalation every 12 hours  vancomycin    Solution 125 milliGRAM(s) Oral every 12 hours  vancomycin  IVPB 750 milliGRAM(s) IV Intermittent every 12 hours  vancomycin  IVPB        MEDICATIONS  (PRN):  acetaminophen    Suspension .. 650 milliGRAM(s) Oral every 6 hours PRN Temp greater or equal to 38C (100.4F), Mild Pain (1 - 3)      LABS:                        10.1   12.67 )-----------( 344      ( 16 Oct 2022 00:41 )             34.6     10-16    138  |  100  |  23  ----------------------------<  156<H>  3.8   |  25  |  0.46<L>    Ca    9.8      16 Oct 2022 00:41  Phos  2.7     10-16  Mg     2.0     10-16    TPro  8.0  /  Alb  3.6  /  TBili  0.4  /  DBili  x   /  AST  18  /  ALT  42  /  AlkPhos  180<H>  10-16    PT/INR - ( 15 Oct 2022 00:30 )   PT: 12.9 sec;   INR: 1.12 ratio         PTT - ( 16 Oct 2022 12:09 )  PTT:60.6 sec    Arterial Blood Gas:  10-16 @ 05:16  7.45/41/188/28/99.6/4.1  ABG lactate: --  Arterial Blood Gas:  10-15 @ 00:05  7.45/40/176/28/98.7/3.5  ABG lactate: --  Arterial Blood Gas:  10-15 @ 00:00  7.41/41/154/26/98.0/1.2  ABG lactate: --        MICROBIOLOGY:     RADIOLOGY:  [ ] Reviewed and interpreted by me    PULMONARY FUNCTION TESTS:    EKG:   · Reason for Admission	Type A aortic dissection      · Subjective and Objective:   CHIEF COMPLAINT: f/up sob, chronic resp failure, TBM, severe persistent asthma, VC dysfunction, Type A aortic dissection s/p repair w/modified "Bentall procedure and hemiarch replacement 9/6-vented -awake and alert, no complaints--some mucus issues but over all better    Interval Events: TC weaning in progress    REVIEW OF SYSTEMS:  Constitutional: No fevers or chills. No weight loss. No fatigue or generalized malaise.  Eyes: No itching or discharge from the eyes  ENT: No ear pain. No ear discharge. No nasal congestion. No post nasal drip. No epistaxis. No throat pain. No sore throat. No difficulty swallowing.   CV: No chest pain. No palpitations. No lightheadedness or dizziness.   Resp: No dyspnea at rest. No dyspnea on exertion. No orthopnea. No wheezing. No cough. No stridor. + sputum production. No chest pain with respiration.  GI: No nausea. No vomiting. No diarrhea.  MSK: No joint pain or pain in any extremities  Integumentary: No skin lesions. + pedal edema.  Neurological: + gross motor weakness. No sensory changes.  [+ ] All other systems negative  [ ] Unable to assess ROS because ________      PHYSICAL EXAM: NAD on vent  General: Awake, alert, oriented X 3.   HEENT: Atraumatic, normocephalic.                 Mallampatti Grade 2                No nasal congestion.                No tonsillar or pharyngeal exudates.  Lymph Nodes: No palpable lymphadenopathy  Neck: No JVD. No carotid bruit.   Respiratory: abnormal chest expansion                         Normal percussion                        decreasedequal air entry                        mild   rhonchi .  Cardiovascular: S1 S2 normal. No murmurs, rubs or gallops.   Abdomen: Soft, non-tender, non-distended. No organomegaly. Normoactive bowel sounds.  Extremities: Warm to touch. Peripheral pulse palpable. + pedal edema.   Skin: No rashes or skin lesions  Neurological: Motor and sensory examination equal and normal in all four extremities.  Psychiatry: Appropriate mood and affect.    OBJECTIVE:  ICU Vital Signs Last 24 Hrs  T(C): 37.1 (16 Oct 2022 12:00), Max: 37.1 (16 Oct 2022 12:00)  T(F): 98.8 (16 Oct 2022 12:00), Max: 98.8 (16 Oct 2022 12:00)  HR: 51 (16 Oct 2022 15:18) (47 - 88)  BP: --  BP(mean): --  ABP: 146/54 (16 Oct 2022 15:00) (109/44 - 146/57)  ABP(mean): 79 (16 Oct 2022 15:00) (62 - 92)  RR: 22 (16 Oct 2022 15:00) (16 - 38)  SpO2: 98% (16 Oct 2022 15:18) (96% - 100%)    O2 Parameters below as of 16 Oct 2022 11:22  Patient On (Oxygen Delivery Method): tracheostomy collar          Mode: standby,trach collar    10-15 @ 07:01  -  10-16 @ 07:00  --------------------------------------------------------  IN: 2574.5 mL / OUT: 2965 mL / NET: -390.5 mL    10-16 @ 07:01  -  10-16 @ 15:56  --------------------------------------------------------  IN: 929 mL / OUT: 665 mL / NET: 264 mL      CAPILLARY BLOOD GLUCOSE  212 (14 Oct 2022 18:00)      POCT Blood Glucose.: 89 mg/dL (16 Oct 2022 11:45)          HOSPITAL MEDICATIONS:  MEDICATIONS  (STANDING):  acetylcysteine 10%  Inhalation 4 milliLiter(s) Inhalation every 12 hours  albuterol/ipratropium for Nebulization 3 milliLiter(s) Nebulizer every 6 hours  buDESOnide    Inhalation Suspension 0.5 milliGRAM(s) Inhalation every 12 hours  chlorhexidine 0.12% Liquid 15 milliLiter(s) Oral Mucosa every 12 hours  chlorhexidine 2% Cloths 1 Application(s) Topical <User Schedule>  collagenase Ointment 1 Application(s) Topical daily  dextrose 50% Injectable 50 milliLiter(s) IV Push every 15 minutes  fluconAZOLE IVPB 600 milliGRAM(s) IV Intermittent every 24 hours  furosemide   Injectable 20 milliGRAM(s) IV Push every 12 hours  heparin  Infusion 1050 Unit(s)/Hr (11 mL/Hr) IV Continuous <Continuous>  hydrALAZINE 37.5 milliGRAM(s) Oral every 8 hours  insulin lispro (ADMELOG) corrective regimen sliding scale   SubCutaneous every 6 hours  insulin NPH human recombinant 22 Unit(s) SubCutaneous every 6 hours  magnesium oxide 400 milliGRAM(s) Oral every 8 hours  meropenem  IVPB 1000 milliGRAM(s) IV Intermittent every 8 hours  methylPREDNISolone 8 milliGRAM(s) Oral daily  mexiletine 200 milliGRAM(s) Oral every 8 hours  multivitamin 1 Tablet(s) Oral daily  pantoprazole  Injectable 40 milliGRAM(s) IV Push daily  sodium chloride 0.9%. 1000 milliLiter(s) (10 mL/Hr) IV Continuous <Continuous>  tobramycin for Nebulization 300 milliGRAM(s) Inhalation every 12 hours  vancomycin    Solution 125 milliGRAM(s) Oral every 12 hours  vancomycin  IVPB 750 milliGRAM(s) IV Intermittent every 12 hours  vancomycin  IVPB        MEDICATIONS  (PRN):  acetaminophen    Suspension .. 650 milliGRAM(s) Oral every 6 hours PRN Temp greater or equal to 38C (100.4F), Mild Pain (1 - 3)      LABS:                        10.1   12.67 )-----------( 344      ( 16 Oct 2022 00:41 )             34.6     10-16    138  |  100  |  23  ----------------------------<  156<H>  3.8   |  25  |  0.46<L>    Ca    9.8      16 Oct 2022 00:41  Phos  2.7     10-16  Mg     2.0     10-16    TPro  8.0  /  Alb  3.6  /  TBili  0.4  /  DBili  x   /  AST  18  /  ALT  42  /  AlkPhos  180<H>  10-16    PT/INR - ( 15 Oct 2022 00:30 )   PT: 12.9 sec;   INR: 1.12 ratio         PTT - ( 16 Oct 2022 12:09 )  PTT:60.6 sec    Arterial Blood Gas:  10-16 @ 05:16  7.45/41/188/28/99.6/4.1  ABG lactate: --  Arterial Blood Gas:  10-15 @ 00:05  7.45/40/176/28/98.7/3.5  ABG lactate: --  Arterial Blood Gas:  10-15 @ 00:00  7.41/41/154/26/98.0/1.2  ABG lactate: --        MICROBIOLOGY:     RADIOLOGY:  [ ] Reviewed and interpreted by me    PULMONARY FUNCTION TESTS:    EKG:

## 2022-10-16 NOTE — PROGRESS NOTE ADULT - SUBJECTIVE AND OBJECTIVE BOX
Patient seen and examined at the bedside.    Remained critically ill on continuous ICU monitoring.    24 Hour Events:    OBJECTIVE:  Vital Signs Last 24 Hrs  T(C): 36.5 (16 Oct 2022 00:00), Max: 36.5 (16 Oct 2022 00:00)  T(F): 97.7 (16 Oct 2022 00:00), Max: 97.7 (16 Oct 2022 00:00)  HR: 85 (16 Oct 2022 06:00) (70 - 89)  BP: --  BP(mean): --  RR: 16 (16 Oct 2022 06:00) (16 - 35)  SpO2: 100% (16 Oct 2022 06:00) (95% - 100%)    Parameters below as of 16 Oct 2022 04:00  Patient On (Oxygen Delivery Method): ventilator    O2 Concentration (%): 40      Physical Exam:   General: OOBTC yesterday, NAD  Neurology: interactive, able to follow simple commands  ENT/Neck: + trach c/d/i, neck supple, trachea midline   Respiratory: coarse BS b/l, rhonchi throughout. + Coughing thick secretions  CV: S1S2, no murmurs        [x] Sinus Rhythm   Abdominal: Soft, NT, ND, colostomy in place  Extremities: +4 RUE edema, 3+LUE edema, trace edema noted, + peripheral pulses   Skin: Dry dressing over right heel, R elbow wound w/ overlying cling dressing c/d/i                     Assessment:  71F PMH DM, COPD, chronic adrenal insufficiency on Prednisone, history of colorectal cancer s/p resection (colostomy bag), Hx of CAD, chronic AF on Eliquis, and tracheomalacia s/p multiple intubations, recent dx of R foot OM s/p debridement on antibiotics and presented to ED at [facility] this morning with epigastric pain, belching and central chest pain. Found on CTA to have type A aortic dissection and transferred to [facility] for surgical evaluation by Dr. Cabrera.     Ascending aortic dissection s/p type A dissection repair and Biobentall on 9/7   Respiratory failure s/p Trach 10/10/22  Hypovolemia   Post op respiratory failure   Acute blood loss anemia  Stress hyperglycemia   Leukocytosis   RIJ thrombus  MRSA PNA        Plan:    ***Neuro***  Off sedation, follows simple commands, continue to monitor  PT/OT progress as tolerated      ***Cardiovascular***  Limited TTE on 10/1: EF 69%, Hyperdynamic left ventricular systolic function. There is hypokinesis of the mid-base inferior wall. Nml RV fxn.   CT chest on 9/28: No CT evidence of pulmonary embolism. Status post repair of ascending aortic dissection with a stable dissection flap originating from the aortic arch and extending into the infrarenal abdominal aorta,  B/l UE duplex on 10/5: As before, there is an occluded RIGHT IJ vein with thrombosis extending to brachiocephalic vein. Superficial thrombophlebitis of the LEFT cephalic vein.  Invasive hemodynamic monitoring, assess perfusion indices   SR / CVP 0/ MAP 78/ Hct 34.6/ Lactate 1.9  [x] Hydralazine PO for BP management up-titrated to 25 TID, up-titrate lopressor as HR allows  Rate control with Mexiletine Digoxin, and Lopressor   [x] AC Therapy with Heparin PTT 50-60  Reassessment of hemodynamics  Serial EKG and cardiac enzymes       ***Pulmonary***  Respiratory failure s/p Trach #8 portex  10/10/22, per ENT inner cannula needs to be changed daily, sutures can be DC tomorrow (10/17)  Post op vent management   Titration of FiO2 and PEEP, follow SpO2, CXR, blood gasses   Bronched 10/13  CPAP/TC trials as tolerated      Mode: CPAP with PS  FiO2: 40  PEEP: 5  PS: 12  MAP: 9  PIP: 20              ***GI***  [x] Diet:  TFs, Glucerna 1.5 @ 55cc/hr   [x] Protonix         ***Renal***  Continue to monitor I/Os, BUN/Creatinine.   Replete lytes PRN  Amezcua present   Diuresis with Lasix       ***ID***  SCx on 10/4+Methicillin resistant Staphylococcus aureus, c/w IVPB Vancomycin, next trough 4am 10/11 (plan for 6 week course in setting of valve surgery, ?11/8 end date)  9/27 blood culture Neela Glabarata, on flucanazole (lifelong suppression)  PO Vancomycin solution for C.diff prophylaxis   R elbow wound w/ eschar-> wound team evaluated, ortho signed off (neg xrays, not likely septic arthritis)  10/12 with rising WBC, central line changed, ID re-consulted and meropenum reinitiated. blood cultures sent, f/u results  S/p IR for elbow drainage 10/13  BCx 10/13 NGTD  R Elbow Abscess Cx 10/13 + Few Proteus mirabilis ESBL  Continue abx regimen vanco/suraj/ flucanazole until cultures finalize      ***Endocrine***  [x]  DM : HbA1c 9.4%                - [x] ISS  [x] NPH              - Need tight glycemic control to prevent wound infection.          Patient requires continuous monitoring with bedside rhythm monitoring, pulse oximetry monitoring, and continuous central venous and arterial pressure monitoring; and intermittent blood gas analysis. Care plan discussed with the ICU care team.   Patient remained critical, at risk for life threatening decompensation.    I have spent 30 minutes providing critical care management to this patient.    By signing my name below, I, Maria D'Amico, attest that this documentation has been prepared under the direction and in the presence of Gena Meyer NP  Electronically signed: Maria D'Amico, Scribe, 10-16-22 @ 07:28    I, Gena Meyer, personally performed the services described in this documentation. all medical record entries made by the scribe were at my direction and in my presence. I have reviewed the chart and agree that the record reflects my personal performance and is accurate and complete  Electronically signed: Gena Meyer NP Patient seen and examined at the bedside.    Remained critically ill on continuous ICU monitoring.    24 Hour Events:  TC x 13 hours, rested on CPAP 12/5 overnight. Back on TC this morning at 6a  OOBTC  Heparin drip titrated    OBJECTIVE:  Vital Signs Last 24 Hrs  T(C): 36.5 (16 Oct 2022 00:00), Max: 36.5 (16 Oct 2022 00:00)  T(F): 97.7 (16 Oct 2022 00:00), Max: 97.7 (16 Oct 2022 00:00)  HR: 85 (16 Oct 2022 06:00) (70 - 89)  BP: --  BP(mean): --  RR: 16 (16 Oct 2022 06:00) (16 - 35)  SpO2: 100% (16 Oct 2022 06:00) (95% - 100%)    Parameters below as of 16 Oct 2022 04:00  Patient On (Oxygen Delivery Method): ventilator    O2 Concentration (%): 40      Physical Exam:   General: OOBTC yesterday, NAD  Neurology: interactive, able to follow simple commands  ENT/Neck: + trach c/d/i, neck supple, trachea midline   Respiratory: coarse BS b/l, rhonchi throughout. + Coughing thick secretions  CV: S1S2, no murmurs        [x] Sinus Rhythm   Abdominal: Soft, NT, ND, colostomy in place  Extremities: +4 RUE edema, 3+LUE edema, trace edema noted, + peripheral pulses   Skin: Dry dressing over right heel, R elbow wound w/ overlying cling dressing c/d/i                     Assessment:  71F PMH DM, COPD, chronic adrenal insufficiency on Prednisone, history of colorectal cancer s/p resection (colostomy bag), Hx of CAD, chronic AF on Eliquis, and tracheomalacia s/p multiple intubations, recent dx of R foot OM s/p debridement on antibiotics and presented to ED at [facility] this morning with epigastric pain, belching and central chest pain. Found on CTA to have type A aortic dissection and transferred to [facility] for surgical evaluation by Dr. Cabrera.     Ascending aortic dissection s/p type A dissection repair and Biobentall on 9/7   Respiratory failure s/p Trach 10/10/22  Hypovolemia   Post op respiratory failure   Acute blood loss anemia  Stress hyperglycemia   Leukocytosis   RIJ thrombus  MRSA PNA        Plan:    ***Neuro***  Off sedation, follows simple commands, continue to monitor  PT/OT progress as tolerated      ***Cardiovascular***  Limited TTE on 10/1: EF 69%, Hyperdynamic left ventricular systolic function. There is hypokinesis of the mid-base inferior wall. Nml RV fxn.   CT chest on 9/28: No CT evidence of pulmonary embolism. Status post repair of ascending aortic dissection with a stable dissection flap originating from the aortic arch and extending into the infrarenal abdominal aorta,  B/l UE duplex on 10/5: As before, there is an occluded RIGHT IJ vein with thrombosis extending to brachiocephalic vein. Superficial thrombophlebitis of the LEFT cephalic vein. remains on heparin drip PTT goal (50-60)  Invasive hemodynamic monitoring, assess perfusion indices   SR / CVP 0/ MAP 78/ Hct 34.6/ Lactate 1.9  [x] Hydralazine PO for BP management up-titrated to 37.5 TID  Rate control with Mexiletine Digoxin, and Lopressor check dig level today, HR 50's, decrease lopressor to 25bid  [x] AC Therapy with Heparin PTT 50-60  Reassessment of hemodynamics  Serial EKG and cardiac enzymes       ***Pulmonary***  Respiratory failure s/p Trach #8 portex  10/10/22, per ENT inner cannula needs to be changed daily, sutures can be DC tomorrow (10/17)  Post op vent management   Titration of FiO2 and PEEP, follow SpO2, CXR, blood gasses   Bronched 10/13  CPAP/TC trials as tolerated  Ambu bag and suction as needed for thick secretions, continue mucomyst/duonebs      Mode: CPAP with PS  FiO2: 40  PEEP: 5  PS: 12  MAP: 9  PIP: 20              ***GI***  [x] Diet:  TFs, Glucerna 1.5 @ 55cc/hr   [x] Protonix         ***Renal***  Continue to monitor I/Os, BUN/Creatinine.   Replete lytes PRN  Amezcua present   Diuresis with Lasix       ***ID***  SCx on 10/4+Methicillin resistant Staphylococcus aureus, c/w IVPB Vancomycin, (plan for 6 week course in setting of valve surgery, ?11/8 end date)  9/27 blood culture Neela Glabarata, on flucanazole (lifelong suppression)  PO Vancomycin solution for C.diff prophylaxis   R elbow wound, S/p IR for elbow drainage 10/13 + Few Proteus mirabilis ESBL, continue with meropenum -discuss with ID for duration  BCx 10/13 NGTD  PO vanco for Cdiff prophylaxis      ***Endocrine***  [x]  DM : HbA1c 9.4%                - [x] ISS  [x] NPH              - Need tight glycemic control to prevent wound infection.          Patient requires continuous monitoring with bedside rhythm monitoring, pulse oximetry monitoring, and continuous central venous and arterial pressure monitoring; and intermittent blood gas analysis. Care plan discussed with the ICU care team.   Patient remained critical, at risk for life threatening decompensation.    I have spent 30 minutes providing critical care management to this patient.    By signing my name below, I, Maria D'Amico, attest that this documentation has been prepared under the direction and in the presence of Gena Meyer NP  Electronically signed: Maria D'Amico, Scribe, 10-16-22 @ 07:28    I, Gena Meyer, personally performed the services described in this documentation. all medical record entries made by the scribe were at my direction and in my presence. I have reviewed the chart and agree that the record reflects my personal performance and is accurate and complete  Electronically signed: Gena Meyer NP

## 2022-10-16 NOTE — PROGRESS NOTE ADULT - ASSESSMENT
72yo Female s/p #8 Portex Cuffed Tracheostomy POD #6. Pt doing well on trach collar with no reported issues.

## 2022-10-16 NOTE — PROGRESS NOTE ADULT - ASSESSMENT
71 year-old female with a history of DM2, CAD, A-Fib on apixaban, Severe Persistent Asthma (on chronic steroids, recently started on Tezspire), colon cancer s/p resection/chemo, and tracheobronchomalacia s/p tracheoplasty, and recent OM of the R foot s/b debridement and completed course of Vancomycin/Ertapenem for MRSA/ESBL Proteus/Corynebacterium who now presents with chest pain, found to have Type A dissection s/p repair with modified Bentall procedure and hemiarch replacement on 22.   Post-op course complicated by acute hypoxemic respiratory failure, shock, anemia, and hyperglycemia requiring insulin gtt. Extubated , now awake and alert with mild encephalopathy but overall significantly improved.    Assessment:  Acute hypoxemic respiratory failure requiring intubation  Type A Aortic Dissection  Severe Persistent Asthma  History of Tracheobronchomalacia    Plan:  See below:  -**************************                                SEE Below:  -improving but weakness  9/15-ABX adjusted to ceftriaxone (LFTS)-PICU  -PICU, low grade temp-fever work up in progress, no resp decline or sx  -resp stable at present but tenuous  -for FEESST today, NGT in place w/TF  -s/p FEESST-passed, remains in PICU          -TF in place at 40cc, more OOB          -hypoglycemia noted and rx, no change in resp status  -vented/sedated-pressors/inotropes/ABX  -remains vented on nitrous/less O2 needs, improving LFTS                   -vented/semi sedated--less O2 needs  10/3-on vanco, weaning sedation/, all lines changed  10/6-no changes-now afebrile-on vanco   10/7-no weaning-remains off pressors  10/10-for trach, no weaning done               10/11-s/p trach-uneventful  10/12-TC and ;cpap done and tolerated well  10/13-weaning TC continues              10/14-mucus issues-TC continues  Bronched 10/13  10/16 ------- Respiratory failure s/p Trach #8 portex  10/10/22, per ENT inner cannula needs to be changed daily, sutures can be DC tomorrow (10/17)  Post op vent management ---Titration of FiO2 and PEEP, follow SpO2, CXR, blood gasses   CPAP/TC trials as tolerated  Ambu bag and suction as needed for thick secretions/mucus plugging , continue mucomyst/duonebs  s/p Aortic aneurysm repair--as per CTS staff care  AF-on heparin--eventual eliquis rx    prog--critical in PICU   on antibitics  vanco/fluconazole/-------[ SCx on 10/4+Methicillin resistant Staphylococcus aureus, c/w IVPB Vancomycin, (plan for 6 week course in setting of valve surgery, ? end date)    from   blood culture Neela Glabarata, on flucanazole (lifelong suppression)  on  merero-- -discuss with ID for duration  f/u by DR SPRING

## 2022-10-16 NOTE — PROGRESS NOTE ADULT - PROBLEM SELECTOR PLAN 1
- Keep the Trach site clean and dry.   - Plan to Remove Sutures tomorrow, 10/17  - Keep the Omniflex for a week to avoid the manipulation of the stoma.   - Elevate HOB.   - Gentle suction prn.   - ENT will continue to follow.

## 2022-10-17 LAB
ALBUMIN SERPL ELPH-MCNC: 3.4 G/DL — SIGNIFICANT CHANGE UP (ref 3.3–5)
ALP SERPL-CCNC: 161 U/L — HIGH (ref 40–120)
ALT FLD-CCNC: 32 U/L — SIGNIFICANT CHANGE UP (ref 10–45)
ANION GAP SERPL CALC-SCNC: 11 MMOL/L — SIGNIFICANT CHANGE UP (ref 5–17)
APTT BLD: 60 SEC — HIGH (ref 27.5–35.5)
AST SERPL-CCNC: 15 U/L — SIGNIFICANT CHANGE UP (ref 10–40)
BILIRUB SERPL-MCNC: 0.3 MG/DL — SIGNIFICANT CHANGE UP (ref 0.2–1.2)
BUN SERPL-MCNC: 26 MG/DL — HIGH (ref 7–23)
CALCIUM SERPL-MCNC: 9.7 MG/DL — SIGNIFICANT CHANGE UP (ref 8.4–10.5)
CHLORIDE SERPL-SCNC: 99 MMOL/L — SIGNIFICANT CHANGE UP (ref 96–108)
CO2 SERPL-SCNC: 27 MMOL/L — SIGNIFICANT CHANGE UP (ref 22–31)
CREAT SERPL-MCNC: 0.45 MG/DL — LOW (ref 0.5–1.3)
CULTURE RESULTS: SIGNIFICANT CHANGE UP
EGFR: 101 ML/MIN/1.73M2 — SIGNIFICANT CHANGE UP
GAS PNL BLDA: SIGNIFICANT CHANGE UP
GLUCOSE BLDC GLUCOMTR-MCNC: 105 MG/DL — HIGH (ref 70–99)
GLUCOSE BLDC GLUCOMTR-MCNC: 113 MG/DL — HIGH (ref 70–99)
GLUCOSE BLDC GLUCOMTR-MCNC: 124 MG/DL — HIGH (ref 70–99)
GLUCOSE BLDC GLUCOMTR-MCNC: 148 MG/DL — HIGH (ref 70–99)
GLUCOSE BLDC GLUCOMTR-MCNC: 150 MG/DL — HIGH (ref 70–99)
GLUCOSE BLDC GLUCOMTR-MCNC: 151 MG/DL — HIGH (ref 70–99)
GLUCOSE BLDC GLUCOMTR-MCNC: 185 MG/DL — HIGH (ref 70–99)
GLUCOSE BLDC GLUCOMTR-MCNC: 188 MG/DL — HIGH (ref 70–99)
GLUCOSE BLDC GLUCOMTR-MCNC: 80 MG/DL — SIGNIFICANT CHANGE UP (ref 70–99)
GLUCOSE BLDC GLUCOMTR-MCNC: 92 MG/DL — SIGNIFICANT CHANGE UP (ref 70–99)
GLUCOSE BLDC GLUCOMTR-MCNC: 93 MG/DL — SIGNIFICANT CHANGE UP (ref 70–99)
GLUCOSE SERPL-MCNC: 144 MG/DL — HIGH (ref 70–99)
HCT VFR BLD CALC: 32.6 % — LOW (ref 34.5–45)
HGB BLD-MCNC: 9.7 G/DL — LOW (ref 11.5–15.5)
INR BLD: 1.13 RATIO — SIGNIFICANT CHANGE UP (ref 0.88–1.16)
MAGNESIUM SERPL-MCNC: 2.1 MG/DL — SIGNIFICANT CHANGE UP (ref 1.6–2.6)
MCHC RBC-ENTMCNC: 23.7 PG — LOW (ref 27–34)
MCHC RBC-ENTMCNC: 29.8 GM/DL — LOW (ref 32–36)
MCV RBC AUTO: 79.5 FL — LOW (ref 80–100)
NRBC # BLD: 1 /100 WBCS — HIGH (ref 0–0)
PHOSPHATE SERPL-MCNC: 2.8 MG/DL — SIGNIFICANT CHANGE UP (ref 2.5–4.5)
PLATELET # BLD AUTO: 321 K/UL — SIGNIFICANT CHANGE UP (ref 150–400)
POTASSIUM SERPL-MCNC: 4.1 MMOL/L — SIGNIFICANT CHANGE UP (ref 3.5–5.3)
POTASSIUM SERPL-SCNC: 4.1 MMOL/L — SIGNIFICANT CHANGE UP (ref 3.5–5.3)
PROT SERPL-MCNC: 7.6 G/DL — SIGNIFICANT CHANGE UP (ref 6–8.3)
PROTHROM AB SERPL-ACNC: 13 SEC — SIGNIFICANT CHANGE UP (ref 10.5–13.4)
RBC # BLD: 4.1 M/UL — SIGNIFICANT CHANGE UP (ref 3.8–5.2)
RBC # FLD: 24.3 % — HIGH (ref 10.3–14.5)
SODIUM SERPL-SCNC: 137 MMOL/L — SIGNIFICANT CHANGE UP (ref 135–145)
SPECIMEN SOURCE: SIGNIFICANT CHANGE UP
WBC # BLD: 14.4 K/UL — HIGH (ref 3.8–10.5)
WBC # FLD AUTO: 14.4 K/UL — HIGH (ref 3.8–10.5)

## 2022-10-17 PROCEDURE — 99232 SBSQ HOSP IP/OBS MODERATE 35: CPT

## 2022-10-17 PROCEDURE — 99231 SBSQ HOSP IP/OBS SF/LOW 25: CPT

## 2022-10-17 PROCEDURE — 71045 X-RAY EXAM CHEST 1 VIEW: CPT | Mod: 26

## 2022-10-17 PROCEDURE — 99291 CRITICAL CARE FIRST HOUR: CPT | Mod: 24

## 2022-10-17 RX ORDER — DEXTROSE 50 % IN WATER 50 %
25 SYRINGE (ML) INTRAVENOUS ONCE
Refills: 0 | Status: COMPLETED | OUTPATIENT
Start: 2022-10-17 | End: 2022-10-17

## 2022-10-17 RX ORDER — HUMAN INSULIN 100 [IU]/ML
11 INJECTION, SUSPENSION SUBCUTANEOUS EVERY 6 HOURS
Refills: 0 | Status: DISCONTINUED | OUTPATIENT
Start: 2022-10-17 | End: 2022-10-18

## 2022-10-17 RX ORDER — DEXTROSE 10 % IN WATER 10 %
1000 INTRAVENOUS SOLUTION INTRAVENOUS
Refills: 0 | Status: DISCONTINUED | OUTPATIENT
Start: 2022-10-17 | End: 2022-10-17

## 2022-10-17 RX ORDER — METOPROLOL TARTRATE 50 MG
12.5 TABLET ORAL EVERY 12 HOURS
Refills: 0 | Status: DISCONTINUED | OUTPATIENT
Start: 2022-10-17 | End: 2022-11-01

## 2022-10-17 RX ADMIN — Medication 15 MILLILITER(S): at 15:19

## 2022-10-17 RX ADMIN — Medication 37.5 MILLIGRAM(S): at 21:27

## 2022-10-17 RX ADMIN — FLUCONAZOLE 150 MILLIGRAM(S): 150 TABLET ORAL at 21:29

## 2022-10-17 RX ADMIN — Medication 3 MILLILITER(S): at 17:32

## 2022-10-17 RX ADMIN — Medication 250 MILLIGRAM(S): at 07:54

## 2022-10-17 RX ADMIN — MEXILETINE HYDROCHLORIDE 200 MILLIGRAM(S): 150 CAPSULE ORAL at 21:28

## 2022-10-17 RX ADMIN — MAGNESIUM OXIDE 400 MG ORAL TABLET 400 MILLIGRAM(S): 241.3 TABLET ORAL at 21:26

## 2022-10-17 RX ADMIN — Medication 4 MILLILITER(S): at 17:33

## 2022-10-17 RX ADMIN — Medication 0.5 MILLIGRAM(S): at 17:32

## 2022-10-17 RX ADMIN — Medication 300 MILLIGRAM(S): at 06:21

## 2022-10-17 RX ADMIN — MEROPENEM 100 MILLIGRAM(S): 1 INJECTION INTRAVENOUS at 15:19

## 2022-10-17 RX ADMIN — MAGNESIUM OXIDE 400 MG ORAL TABLET 400 MILLIGRAM(S): 241.3 TABLET ORAL at 15:17

## 2022-10-17 RX ADMIN — Medication 37.5 MILLIGRAM(S): at 15:18

## 2022-10-17 RX ADMIN — MEROPENEM 100 MILLIGRAM(S): 1 INJECTION INTRAVENOUS at 05:04

## 2022-10-17 RX ADMIN — Medication 12.5 MILLIGRAM(S): at 17:19

## 2022-10-17 RX ADMIN — CHLORHEXIDINE GLUCONATE 15 MILLILITER(S): 213 SOLUTION TOPICAL at 05:03

## 2022-10-17 RX ADMIN — MAGNESIUM OXIDE 400 MG ORAL TABLET 400 MILLIGRAM(S): 241.3 TABLET ORAL at 05:02

## 2022-10-17 RX ADMIN — MEXILETINE HYDROCHLORIDE 200 MILLIGRAM(S): 150 CAPSULE ORAL at 05:01

## 2022-10-17 RX ADMIN — Medication 3 MILLILITER(S): at 11:08

## 2022-10-17 RX ADMIN — Medication 8 MILLIGRAM(S): at 05:01

## 2022-10-17 RX ADMIN — Medication 2: at 17:25

## 2022-10-17 RX ADMIN — HEPARIN SODIUM 11 UNIT(S)/HR: 5000 INJECTION INTRAVENOUS; SUBCUTANEOUS at 07:54

## 2022-10-17 RX ADMIN — HUMAN INSULIN 22 UNIT(S): 100 INJECTION, SUSPENSION SUBCUTANEOUS at 00:13

## 2022-10-17 RX ADMIN — Medication 250 MILLIGRAM(S): at 20:21

## 2022-10-17 RX ADMIN — Medication 20 MILLIGRAM(S): at 05:02

## 2022-10-17 RX ADMIN — Medication 4 MILLILITER(S): at 06:21

## 2022-10-17 RX ADMIN — Medication 25 MILLILITER(S): at 11:10

## 2022-10-17 RX ADMIN — HUMAN INSULIN 22 UNIT(S): 100 INJECTION, SUSPENSION SUBCUTANEOUS at 05:37

## 2022-10-17 RX ADMIN — Medication 0.5 MILLIGRAM(S): at 06:21

## 2022-10-17 RX ADMIN — Medication 20 MILLIGRAM(S): at 17:19

## 2022-10-17 RX ADMIN — Medication 1 APPLICATION(S): at 09:22

## 2022-10-17 RX ADMIN — Medication 37.5 MILLIGRAM(S): at 05:01

## 2022-10-17 RX ADMIN — Medication 1 TABLET(S): at 11:15

## 2022-10-17 RX ADMIN — PANTOPRAZOLE SODIUM 40 MILLIGRAM(S): 20 TABLET, DELAYED RELEASE ORAL at 11:15

## 2022-10-17 RX ADMIN — HEPARIN SODIUM 11 UNIT(S)/HR: 5000 INJECTION INTRAVENOUS; SUBCUTANEOUS at 22:33

## 2022-10-17 RX ADMIN — Medication 300 MILLIGRAM(S): at 17:32

## 2022-10-17 RX ADMIN — CHLORHEXIDINE GLUCONATE 1 APPLICATION(S): 213 SOLUTION TOPICAL at 05:45

## 2022-10-17 RX ADMIN — Medication 15 MILLILITER(S): at 07:52

## 2022-10-17 RX ADMIN — CHLORHEXIDINE GLUCONATE 15 MILLILITER(S): 213 SOLUTION TOPICAL at 17:34

## 2022-10-17 RX ADMIN — Medication 3 MILLILITER(S): at 06:21

## 2022-10-17 RX ADMIN — Medication 125 MILLIGRAM(S): at 05:02

## 2022-10-17 RX ADMIN — MEXILETINE HYDROCHLORIDE 200 MILLIGRAM(S): 150 CAPSULE ORAL at 15:18

## 2022-10-17 RX ADMIN — Medication 3 MILLILITER(S): at 00:42

## 2022-10-17 NOTE — PROGRESS NOTE ADULT - SUBJECTIVE AND OBJECTIVE BOX
ENT ISSUE/POD: S/P #8 Portex Cuffed Tracheostomy POD #7    HPI:  72yo Female s/p #8 Portex Cuffed Tracheostomy POD #7. Pt seen and examined at bedside. No acute events overnight. Doing well on trach collar with no reported issues.         PAST MEDICAL & SURGICAL HISTORY:  Atrial fibrillation  paroxysmal, on eliquis      Diabetes  Type 2      COPD (chronic obstructive pulmonary disease)      Adrenal insufficiency  Medrol daily for over 50 years      Aortic insufficiency  moderate AR on echo 5/3/2018      Pelvic fracture      Asthma      Tracheobronchomalacia  diagnosed 2015, s/p bronchial thermoplasty 2016 (Dr Zapien); recent bronchoscopy 6/5/2018 revealed no evidence of tracheobronchomalacia in trachea or bronchial tubes      Colorectal cancer  4/2018- last treatment , chemo and radiation      Rectal bleeding      Seizure  x 1 1/7/18      DVT (deep venous thrombosis)  15-20 years ago, took coumadin      TIA (transient ischemic attack)  multiple, last 5 years ago - presents as right-sided weakness      History of partial hysterectomy  30 years ago - fibroids      H/O total knee replacement, bilateral  5 years ago      History of sinus surgery  multiple sinus surgeries      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      H/O pelvic surgery  5 years ago - s/p fracture      History of tracheomalacia  2015 - attempted tracheal stenting ([facility])- course complicated by obstruction, respiratory failure, multiple CPR attempts -  stent discontinued; 10/20/2016 Tracheobronchoplasty (Prolene Mesh) performed at [facility] by Dr Zapien      S/P bronchoscopy  6/5/2018 - [place] [facility] (Dr Zapien) no evidence of tracheobronchomalacia in trachea or bronchial tubes      Rectal bleeding  exam under anesthesia ([facility]) 2/2018        Allergies    aspirin (Short breath)  Avelox (Short breath; Pruritus)  cefepime (Anaphylaxis)  codeine (Short breath)  Dilaudid (Short breath)  iodine (Short breath; Swelling)  penicillin (Anaphylaxis)  shellfish (Anaphylaxis)  tetanus toxoid (Short breath)  Valium (Short breath)    Intolerances      MEDICATIONS  (STANDING):  acetylcysteine 10%  Inhalation 4 milliLiter(s) Inhalation every 12 hours  albuterol/ipratropium for Nebulization 3 milliLiter(s) Nebulizer every 6 hours  buDESOnide    Inhalation Suspension 0.5 milliGRAM(s) Inhalation every 12 hours  chlorhexidine 0.12% Liquid 15 milliLiter(s) Oral Mucosa every 12 hours  chlorhexidine 2% Cloths 1 Application(s) Topical <User Schedule>  collagenase Ointment 1 Application(s) Topical daily  dextrose 10%. 1000 milliLiter(s) (15 mL/Hr) IV Continuous <Continuous>  dextrose 50% Injectable 50 milliLiter(s) IV Push every 15 minutes  fluconAZOLE IVPB 600 milliGRAM(s) IV Intermittent every 24 hours  furosemide   Injectable 20 milliGRAM(s) IV Push every 12 hours  heparin  Infusion 1050 Unit(s)/Hr (11 mL/Hr) IV Continuous <Continuous>  hydrALAZINE 37.5 milliGRAM(s) Oral every 8 hours  insulin lispro (ADMELOG) corrective regimen sliding scale   SubCutaneous every 6 hours  insulin NPH human recombinant 22 Unit(s) SubCutaneous every 6 hours  magnesium oxide 400 milliGRAM(s) Oral every 8 hours  meropenem  IVPB 1000 milliGRAM(s) IV Intermittent every 8 hours  methylPREDNISolone 8 milliGRAM(s) Oral daily  mexiletine 200 milliGRAM(s) Oral every 8 hours  multivitamin 1 Tablet(s) Oral daily  pantoprazole  Injectable 40 milliGRAM(s) IV Push daily  sodium chloride 0.9%. 1000 milliLiter(s) (10 mL/Hr) IV Continuous <Continuous>  tobramycin for Nebulization 300 milliGRAM(s) Inhalation every 12 hours  vancomycin    Solution 125 milliGRAM(s) Oral every 12 hours  vancomycin  IVPB 750 milliGRAM(s) IV Intermittent every 12 hours  vancomycin  IVPB        MEDICATIONS  (PRN):  acetaminophen    Suspension .. 650 milliGRAM(s) Oral every 6 hours PRN Temp greater or equal to 38C (100.4F), Mild Pain (1 - 3)      Social History: see consult    Family history: see consult    ROS:   ENT: all negative except as noted in HPI   Pulm: denies SOB, cough, hemoptysis  Neuro: denies numbness/tingling, loss of sensation  Endo: denies heat/cold intolerance, excessive sweating      Vital Signs Last 24 Hrs  T(C): 36.3 (17 Oct 2022 04:00), Max: 37.1 (16 Oct 2022 12:00)  T(F): 97.3 (17 Oct 2022 04:00), Max: 98.8 (16 Oct 2022 12:00)  HR: 88 (17 Oct 2022 08:00) (47 - 92)  BP: --  BP(mean): --  RR: 25 (17 Oct 2022 08:00) (16 - 38)  SpO2: 98% (17 Oct 2022 08:00) (97% - 100%)    Parameters below as of 17 Oct 2022 08:00  Patient On (Oxygen Delivery Method): tracheostomy collar    O2 Concentration (%): 40                          9.7    14.40 )-----------( 321      ( 17 Oct 2022 00:32 )             32.6    10-17    137  |  99  |  26<H>  ----------------------------<  144<H>  4.1   |  27  |  0.45<L>    Ca    9.7      17 Oct 2022 00:32  Phos  2.8     10-17  Mg     2.1     10-17    TPro  7.6  /  Alb  3.4  /  TBili  0.3  /  DBili  x   /  AST  15  /  ALT  32  /  AlkPhos  161<H>  10-17   PT/INR - ( 17 Oct 2022 00:32 )   PT: 13.0 sec;   INR: 1.13 ratio         PTT - ( 17 Oct 2022 00:32 )  PTT:60.0 sec    PHYSICAL EXAM:  Gen: NAD  Skin: No rashes, bruises, or lesions  Head: Normocephalic, Atraumatic  Face: no edema, erythema, or fluctuance. Parotid glands soft without mass  Eyes: no scleral injection  Nose: Nares bilaterally patent, no discharge  Mouth: No Stridor / Drooling / Trismus.  Mucosa moist, tongue/uvula midline, oropharynx clear  Neck: Portex #8 cuffed trach in place, suture removed today, velcro tie in place, on trach collar. Flat, supple, no lymphadenopathy, trachea midline, no masses  Lymphatic: No lymphadenopathy  Resp: breathing easily, no stridor  Neuro: facial nerve intact, no facial droop

## 2022-10-17 NOTE — PROGRESS NOTE ADULT - PROBLEM SELECTOR PLAN 1
- Keep the Trach site clean and dry.   - Elevate HOB.   - Gentle suction prn.   - ENT will continue to follow. - Keep the Trach site clean and dry.   - Elevate HOB.   - Gentle suction prn.   - Call ENT as needed

## 2022-10-17 NOTE — PROGRESS NOTE ADULT - SUBJECTIVE AND OBJECTIVE BOX
Patient seen and examined at the bedside.    Remained critically ill on continuous ICU monitoring.    24 Hour events:     OBJECTIVE:  Vital Signs Last 24 Hrs  T(C): 36.3 (17 Oct 2022 04:00), Max: 37.1 (16 Oct 2022 12:00)  T(F): 97.3 (17 Oct 2022 04:00), Max: 98.8 (16 Oct 2022 12:00)  HR: 85 (17 Oct 2022 06:00) (47 - 92)  BP: --  BP(mean): --  RR: 25 (17 Oct 2022 06:00) (16 - 38)  SpO2: 99% (17 Oct 2022 06:00) (97% - 100%)    Parameters below as of 17 Oct 2022 05:11  Patient On (Oxygen Delivery Method): tracheostomy collar  O2 Flow (L/min): 12  O2 Concentration (%): 40      Physical Exam:   General: OOBTC, NAD  Neurology: interactive, able to follow simple commands  ENT/Neck: + trach c/d/i, neck supple, trachea midline   Respiratory: coarse BS b/l, rhonchi throughout. + Coughing thick secretions  CV: S1S2, no murmurs        [x] Sinus Rhythm   Abdominal: Soft, NT, ND, colostomy in place  Extremities: +4 RUE edema, 3+LUE edema, trace edema noted, + peripheral pulses   Skin: Dry dressing over right heel, R elbow wound w/ overlying cling dressing c/d/i                            Assessment:  71F PMH DM, COPD, chronic adrenal insufficiency on Prednisone, history of colorectal cancer s/p resection (colostomy bag), Hx of CAD, chronic AF on Eliquis, and tracheomalacia s/p multiple intubations, recent dx of R foot OM s/p debridement on antibiotics and presented to ED at [facility] this morning with epigastric pain, belching and central chest pain. Found on CTA to have type A aortic dissection and transferred to [facility] for surgical evaluation by Dr. Cabrera.     Ascending aortic dissection s/p type A dissection repair and Biobentall on 9/7   Respiratory failure s/p Trach 10/10/22  Hypovolemia   Post op respiratory failure   Acute blood loss anemia  Stress hyperglycemia   Leukocytosis   RIJ thrombus  MRSA PNA        Plan:   ***Neuro***  Off sedation, follows simple commands, continue to monitor  PT/OT progress as tolerated      ***Cardiovascular***  Limited TTE on 10/1: EF 69%, Hyperdynamic left ventricular systolic function. There is hypokinesis of the mid-base inferior wall. Nml RV fxn.   CT chest on 9/28: No CT evidence of pulmonary embolism. Status post repair of ascending aortic dissection with a stable dissection flap originating from the aortic arch and extending into the infrarenal abdominal aorta,  B/l UE duplex on 10/5: As before, there is an occluded RIGHT IJ vein with thrombosis extending to brachiocephalic vein. Superficial thrombophlebitis of the LEFT cephalic vein. remains on heparin drip PTT goal (50-60)  Invasive hemodynamic monitoring, assess perfusion indices   SR / CVP 1/ MAP 71/ Hct 32.6/ Lactate 1.7  [x] Hydralazine PO for BP management up-titrated to 37.5 TID  Rate control with Mexiletine checked dig level yesterday, HR 50's, lopressor and digoxin d/c'ed  [x] AC Therapy with Heparin PTT 50-60  Reassessment of hemodynamics  Serial EKG and cardiac enzymes   Serial EKG and cardiac enzymes     ***Pulmonary***  Respiratory failure s/p Trach #8 portex  10/10/22, per ENT inner cannula needs to be changed daily,  Post op vent management   Titration of FiO2 and PEEP, follow SpO2, CXR, blood gasses   Bronched 10/13  CPAP/TC trials as tolerated  Ambu bag and suction as needed for thick secretions, continue mucomyst/duonebs      Mode: standby  FiO2: 40              ***GI***  [x] Diet:  TFs, Glucerna 1.5 @ 55cc/hr   [x] Protonix       ***Renal***  Continue to monitor I/Os, BUN/Creatinine.   Replete lytes PRN  Amezcua present   Diuresis with Lasix       ***ID***  SCx on 10/4+Methicillin resistant Staphylococcus aureus, c/w IVPB Vancomycin, (plan for 6 week course in setting of valve surgery, ?11/8 end date)  9/27 blood culture Neela Glabarata, on flucanazole (lifelong suppression)  PO Vancomycin solution for C.diff prophylaxis   R elbow wound, S/p IR for elbow drainage 10/13 + Few Proteus mirabilis ESBL, continue with meropenum 7 day trial (10/12/2022-10/19/2022)  BCx 10/13 NGTD  PO vanco for Cdiff prophylaxis    ***Endocrine***  [x]  DM : HbA1c 9.4%                - [x] ISS  [x] NPH              - Need tight glycemic control to prevent wound infection.        Patient requires continuous monitoring with bedside rhythm monitoring, pulse oximetry monitoring, and continuous central venous and arterial pressure monitoring; and intermittent blood gas analysis. Care plan discussed with the ICU care team.   Patient remained critical, at risk for life threatening decompensation.    I have spent 30 minutes providing critical care management to this patient.    By signing my name below, I, Kieran Plascencia, attest that this documentation has been prepared under the direction and in the presence of Gena Meyer NP   Electronically signed: Georgi Cordero, 10-17-22 @ 07:02    I, Gena Meyer NP, personally performed the services described in this documentation. all medical record entries made by the scribe were at my direction and in my presence. I have reviewed the chart and agree that the record reflects my personal performance and is accurate and complete  Electronically signed: Gena Meyer NP  Patient seen and examined at the bedside.    Remained critically ill on continuous ICU monitoring.    24 Hour events:   - Back on TC since 5 AM  - CPAP overnight   - Start on Beta blockers   - Received extra insulin this morning   - D/c'ed PO Vancomycin today     OBJECTIVE:  Vital Signs Last 24 Hrs  T(C): 36.3 (17 Oct 2022 04:00), Max: 37.1 (16 Oct 2022 12:00)  T(F): 97.3 (17 Oct 2022 04:00), Max: 98.8 (16 Oct 2022 12:00)  HR: 85 (17 Oct 2022 06:00) (47 - 92)  BP: --  BP(mean): --  RR: 25 (17 Oct 2022 06:00) (16 - 38)  SpO2: 99% (17 Oct 2022 06:00) (97% - 100%)    Parameters below as of 17 Oct 2022 05:11  Patient On (Oxygen Delivery Method): tracheostomy collar  O2 Flow (L/min): 12  O2 Concentration (%): 40      Physical Exam:   General: OOBTC, NAD  Neurology: interactive, able to follow simple commands  ENT/Neck: + trach c/d/i, neck supple, trachea midline   Respiratory: coarse BS b/l, rhonchi throughout. + Coughing thick secretions  CV: S1S2, no murmurs        [x] Sinus Rhythm   Abdominal: Soft, NT, ND, colostomy in place  Extremities: +4 RUE edema, 3+LUE edema, trace edema noted, + peripheral pulses   Skin: Dry dressing over right heel, R elbow wound w/ overlying cling dressing c/d/i                            Assessment:  71F PMH DM, COPD, chronic adrenal insufficiency on Prednisone, history of colorectal cancer s/p resection (colostomy bag), Hx of CAD, chronic AF on Eliquis, and tracheomalacia s/p multiple intubations, recent dx of R foot OM s/p debridement on antibiotics and presented to ED at [facility] this morning with epigastric pain, belching and central chest pain. Found on CTA to have type A aortic dissection and transferred to [facility] for surgical evaluation by Dr. Cabrera.     Ascending aortic dissection s/p type A dissection repair and Biobentall on 9/7   Respiratory failure s/p Trach 10/10/22  Hypovolemia   Post op respiratory failure   Acute blood loss anemia  Stress hyperglycemia   Leukocytosis   RIJ thrombus  MRSA PNA        Plan:   ***Neuro***  Off sedation, follows simple commands, continue to monitor  PT/OT progress as tolerated      ***Cardiovascular***  Limited TTE on 10/1: EF 69%, Hyperdynamic left ventricular systolic function. There is hypokinesis of the mid-base inferior wall. Nml RV fxn.   CT chest on 9/28: No CT evidence of pulmonary embolism. Status post repair of ascending aortic dissection with a stable dissection flap originating from the aortic arch and extending into the infrarenal abdominal aorta,  B/l UE duplex on 10/5: As before, there is an occluded RIGHT IJ vein with thrombosis extending to brachiocephalic vein. Superficial thrombophlebitis of the LEFT cephalic vein. remains on heparin drip PTT goal (50-60)  Plan to start Beta Blockers today  Invasive hemodynamic monitoring, assess perfusion indices   SR / CVP 1/ MAP 71/ Hct 32.6/ Lactate 1.7  [x] Hydralazine PO for BP management up-titrated to 37.5 TID  Rate control with Mexiletine checked dig level yesterday, HR 50's, lopressor and digoxin d/c'ed  [x] AC Therapy with Heparin PTT 50-60  Reassessment of hemodynamics  Serial EKG and cardiac enzymes   Serial EKG and cardiac enzymes     ***Pulmonary***  Respiratory failure s/p Trach #8 portex  10/10/22, per ENT inner cannula needs to be changed daily,  Post op vent management   Titration of FiO2 and PEEP, follow SpO2, CXR, blood gasses   Bronched 10/13  CPAP/TC trials as tolerated  TC since 5 AM  CPAP overnight  Ambu bag and suction as needed for thick secretions, continue mucomyst/duonebs      Mode: standby  FiO2: 40              ***GI***  [x] Diet:  TFs, Glucerna 1.5 @ 55cc/hr--> Follow up with nutrition to make sure at appropriate rate   [x] Protonix       ***Renal***  Continue to monitor I/Os, BUN/Creatinine.   Replete lytes PRN  Seven present   Diuresis with Lasix       ***ID***  SCx on 10/4+Methicillin resistant Staphylococcus aureus, c/w IVPB Vancomycin, (plan for 6 week course in setting of valve surgery, ?11/8 end date)  9/27 blood culture Neela Glabarata, on flucanazole (lifelong suppression)  PO Vancomycin solution for C.diff prophylaxis   R elbow wound, S/p IR for elbow drainage 10/13 + Few Proteus mirabilis ESBL, continue with meropenum 7 day trial (10/12/2022-10/19/2022)  BCx 10/13 NGTD  PO vanco for Cdiff prophylaxis--> Plan to d/c today     ***Endocrine***  [x]  DM : HbA1c 9.4%                - [x] ISS  [x] NPH; Received extra insulin this morning              - Need tight glycemic control to prevent wound infection.        Patient requires continuous monitoring with bedside rhythm monitoring, pulse oximetry monitoring, and continuous central venous and arterial pressure monitoring; and intermittent blood gas analysis. Care plan discussed with the ICU care team.   Patient remained critical, at risk for life threatening decompensation.    I have spent 30 minutes providing critical care management to this patient.    By signing my name below, I, Kieran Plascencia, attest that this documentation has been prepared under the direction and in the presence of Gena Meyer NP   Electronically signed: Georgi Cordero, 10-17-22 @ 07:02    I, Gena Meyer NP, personally performed the services described in this documentation. all medical record entries made by the marcyibronaldo were at my direction and in my presence. I have reviewed the chart and agree that the record reflects my personal performance and is accurate and complete  Electronically signed: Gena Meyer NP  Patient seen and examined at the bedside.    Remained critically ill on continuous ICU monitoring.    24 Hour events:   - Back on TC since 5 AM, tolerated 13 hours TC yesterday, CPAP overnight   - yesterday SB to 40s-50s, dig and lopressor held, dig level 0.8. Overnight HR back to 80-90s  - This morning may have received 22unit amelog rather than NPH, started on D10 infusion with q1h glucose checks.  - D/c'ed PO Vancomycin today   -OOBTC this morning    OBJECTIVE:  Vital Signs Last 24 Hrs  T(C): 36.3 (17 Oct 2022 04:00), Max: 37.1 (16 Oct 2022 12:00)  T(F): 97.3 (17 Oct 2022 04:00), Max: 98.8 (16 Oct 2022 12:00)  HR: 85 (17 Oct 2022 06:00) (47 - 92)  BP: --  BP(mean): --  RR: 25 (17 Oct 2022 06:00) (16 - 38)  SpO2: 99% (17 Oct 2022 06:00) (97% - 100%)    Parameters below as of 17 Oct 2022 05:11  Patient On (Oxygen Delivery Method): tracheostomy collar  O2 Flow (L/min): 12  O2 Concentration (%): 40      Physical Exam:   General: OOBTC, NAD  Neurology: interactive, able to follow simple commands, denies pain  ENT/Neck: + trach c/d/i, neck supple, trachea midline   Respiratory: coarse BS b/l, rhonchi throughout. + Coughing thick secretions  CV: S1S2, no murmurs        [x] AFib  Abdominal: Soft, NT, ND, colostomy in place  Extremities: +4 RUE edema, 3+LUE edema, trace edema noted, + peripheral pulses   Skin: Dry dressing over right heel, R elbow wound w/ overlying cling dressing c/d/i                            Assessment:  71F PMH DM, COPD, chronic adrenal insufficiency on Prednisone, history of colorectal cancer s/p resection (colostomy bag), Hx of CAD, chronic AF on Eliquis, and tracheomalacia s/p multiple intubations, recent dx of R foot OM s/p debridement on antibiotics and presented to ED at [facility] this morning with epigastric pain, belching and central chest pain. Found on CTA to have type A aortic dissection and transferred to [facility] for surgical evaluation by Dr. Cabrera.     Ascending aortic dissection s/p type A dissection repair and Biobentall on 9/7   Respiratory failure s/p Trach 10/10/22  Hypovolemia   Post op respiratory failure   Acute blood loss anemia  Stress hyperglycemia   Leukocytosis   RIJ thrombus  MRSA PNA        Plan:   ***Neuro***  Off sedation, follows simple commands, continue to monitor  PT/OT progress as tolerated      ***Cardiovascular***  Limited TTE on 10/1: EF 69%, Hyperdynamic left ventricular systolic function. There is hypokinesis of the mid-base inferior wall. Nml RV fxn.   CT chest on 9/28: No CT evidence of pulmonary embolism. Status post repair of ascending aortic dissection with a stable dissection flap originating from the aortic arch and extending into the infrarenal abdominal aorta,  B/l UE duplex on 10/5: As before, there is an occluded RIGHT IJ vein with thrombosis extending to brachiocephalic vein. Superficial thrombophlebitis of the LEFT cephalic vein. remains on heparin drip PTT goal (50-60)  Invasive hemodynamic monitoring, assess perfusion indices   SR / CVP 1/ MAP 71/ Hct 32.6/ Lactate 1.7  [x] Hydralazine PO for BP management up-titrated to 37.5 TID  Rate control with Mexiletine, checked dig level yesterday 0.8, HR 50's, lopressor and digoxin d/c'ed. Plan to start back low dose Beta Blockers today  [x] AC Therapy with Heparin PTT 50-60 (for Afib and IJ thrombus)  Reassessment of hemodynamics      ***Pulmonary***  Respiratory failure s/p Trach #8 portex  10/10/22, per ENT inner cannula needs to be changed daily, trach sutures d/c today by ENT  Post op vent management   Titration of FiO2 and PEEP, follow SpO2, CXR, blood gasses   Bronched 10/13  CPAP/TC trials as tolerated  TC since 5 AM  CPAP overnight  Ambu bag and suction as needed for thick secretions, continue mucomyst/duonebs      Mode: standby  FiO2: 40              ***GI***  [x] Diet:  TFs, Glucerna 1.5 @ 55cc/hr--> Follow up with nutrition to make sure at appropriate rate as no Glucerna 1.5 available, switched to glucerna 1.2  [x] Protonix       ***Renal***  Continue to monitor I/Os, BUN/Creatinine.   Replete lytes PRN  Ramirez present ramirez leaking, will dc and trial void  Diuresis with Lasix       ***ID***  SCx on 10/4+Methicillin resistant Staphylococcus aureus, c/w IVPB Vancomycin, (plan for 6 week course in setting of valve surgery, 11/26end date)  9/27 blood culture Neela Glabarata, on flucanazole (lifelong suppression)  PO Vancomycin solution for C.diff prophylaxis   R elbow wound, S/p IR for elbow drainage 10/13 + Few Proteus mirabilis ESBL, continue with meropenum 7 day trial (10/12/2022-10/19/2022)  BCx 10/13 NGTD  PO vanco for Cdiff prophylaxis--> Plan to d/c today     ***Endocrine***  [x]  DM : HbA1c 9.4%                - [x] ISS  [x] NPH; Received extra amelog insulin this morning, NPH d/c temporarily, continue d10 infusion and monitor q1h glucose checks until stable             - Need tight glycemic control to prevent wound infection.        Patient requires continuous monitoring with bedside rhythm monitoring, pulse oximetry monitoring, and continuous central venous and arterial pressure monitoring; and intermittent blood gas analysis. Care plan discussed with the ICU care team.   Patient remained critical, at risk for life threatening decompensation.    I have spent 40 minutes providing critical care management to this patient.    By signing my name below, I, Kieran Plascencia, attest that this documentation has been prepared under the direction and in the presence of Gena Meyer NP   Electronically signed: Rogers Cordero, 10-17-22 @ 07:02    I, Gena Meyer NP, personally performed the services described in this documentation. all medical record entries made by the rogers were at my direction and in my presence. I have reviewed the chart and agree that the record reflects my personal performance and is accurate and complete  Electronically signed: Gena Meyer NP

## 2022-10-17 NOTE — PROGRESS NOTE ADULT - ASSESSMENT
71F w/h/o uncontrolled T2DM (A1C 9.4%) on basal/bolus insulin PTA. Unknown DM complications. Also COPD, secondary adrenal Insufficiency on chronic steroids, colorectal cancer s/p resection (colostomy bag), Chronic A fib on Eliquis, and tracheomalacia and multiple intubations, recent dx of OM presented to ED at [facility] with epigastric pain, belching and central chest pain. Found on CTA to have type A aortic dissection and transferred to [facility] for surgical evaluation by Dr. Cabrera. Now s/p aortic dissection repair on 9/07/22. Endocrine consulted for assistance with uncontrolled DM/steroids for AI. Pt in ICU with ventricular dysfunction/sepsis, and now s/p trach 10/10, off pressors and tolerating TFs with BG thightly control while on present insulin doses. Also requiring D10 infusion this am for possible administration of admelog 22 units this am instead of NPH. Team decreased NPH dose down to 11 units.  Pt likley need less NPH insulin but wouth recommend 18 units if bg >120 AND 11units for BG <120 instead.  BG goal 100 [id number].   Pt remains with leukocytosis and improving transaminitis. Back on Prednisone dose 8mg

## 2022-10-17 NOTE — PROGRESS NOTE ADULT - ASSESSMENT
71 y/o F w/CAD, AFib, asthma, tracheobronchomalacia s/p tracheoplasty, colon cancer s/p resection and chemo admitted for Type A aortic dissection s/p repair w/hospital course c/b acute hypoxemic respiratory failure now s/p trach. MRSA PNA, candida glabarata fungemia    - Intermittently tolerating trach collar, wean from vent as tolerated  - Chest PT, pulmonary toilet, bronchodilators  - Abx/antifungals as per ID  - Postoperative care as per surgical team

## 2022-10-17 NOTE — PROGRESS NOTE ADULT - ASSESSMENT
72yo Female s/p #8 Portex Cuffed Tracheostomy POD #7. Sutures removed today, respiratory therapy will change velcro strap. Pt doing well on trach collar with no reported issues.

## 2022-10-17 NOTE — PROGRESS NOTE ADULT - NUTRITIONAL ASSESSMENT
Diet, NPO with Tube Feed:   Tube Feeding Modality: Nasogastric  Glucerna 1.2 [place] (GLUCERNARTH)  Total Volume for 24 Hours (mL): 1560  Continuous  Starting Tube Feed Rate {mL per Hour}: 65  Until Goal Tube Feed Rate (mL per Hour): 65  Tube Feed Duration (in Hours): 24  Tube Feed Start Time: 08:20 (10-17-22 @ 08:23) [Active]      Please see RD assessment and/or follow up.  Managed by primary team as well

## 2022-10-17 NOTE — PROGRESS NOTE ADULT - NSPROGADDITIONALINFOA_GEN_ALL_CORE
-Plan discussed with pt/team.  Contact info: [phone] (24/7). pager [phone]  [email or web address] password [email or web address]  Teams  Spent 28 minutes assessing pt/labs/meds and discussing plan of care with primary team  Adjusting insulin  Discharge plan  Follow up care

## 2022-10-17 NOTE — PROGRESS NOTE ADULT - SUBJECTIVE AND OBJECTIVE BOX
DIABETES FOLLOW UP NOTE: Saw pt earlier today    Chief Complaint: Endocrine consult requested for management of T2DM    INTERVAL HX: Saw pt in CTU, off pressors and tolerating TFs of Glucerna at 55 cc/hr. BG 80s to low 100s while on present insulin doses. Per primary team pt might had received Admelog 22 units at 6am today instead of NPH> started D10 infusion at 15cc/hr to keep BG >100s.  No hypoglycemia but BG down to 90s. Pt awake and able to nod yes and no to questions. Remains on antibiotics for sepsis     Review of Systems:  General: As above  Cardiovascular: No chest pain, palpitations  Respiratory: No SOB, no cough  GI: No nausea, vomiting, abdominal pain  Endocrine: no S&Sx of hypoglycemia    Allergies    aspirin (Short breath)  Avelox (Short breath; Pruritus)  cefepime (Anaphylaxis)  codeine (Short breath)  Dilaudid (Short breath)  iodine (Short breath; Swelling)  penicillin (Anaphylaxis)  shellfish (Anaphylaxis)  tetanus toxoid (Short breath)  Valium (Short breath)    Intolerances      MEDICATIONS:  fluconAZOLE IVPB 600 milliGRAM(s) IV Intermittent every 24 hours  insulin lispro (ADMELOG) corrective regimen sliding scale   SubCutaneous every 6 hours  insulin NPH human recombinant 11 Unit(s) SubCutaneous every 6 hours  meropenem  IVPB 1000 milliGRAM(s) IV Intermittent every 8 hours  methylPREDNISolone 8 milliGRAM(s) Oral daily  tobramycin for Nebulization 300 milliGRAM(s) Inhalation every 12 hours  vancomycin  IVPB 750 milliGRAM(s) IV Intermittent every 12 hours        PHYSICAL EXAM:  VITALS: T(C): 36.2 (10-17-22 @ 16:00)  T(F): 97.2 (10-17-22 @ 16:00), Max: 97.8 (10-17-22 @ 08:00)  HR: 88 (10-17-22 @ 17:33) (79 - 99)  BP: --  RR:  (16 - 30)  SpO2:  (97% - 100%)  Wt(kg): --  GENERAL: Female sitting in chair in NAD.  HEENT: Trach in place, NGT with TFs on at 65cc/hr  Chest: Sternal incision healed  Abdomen: Soft, nontender, non distended  Extremities: Warm,   NEURO: Alert and able to nod to questions      LABS:  POCT Blood Glucose.: 185 mg/dL (10-17-22 @ 17:17)  POCT Blood Glucose.: 188 mg/dL (10-17-22 @ 15:36)  POCT Blood Glucose.: 151 mg/dL (10-17-22 @ 13:41)  POCT Blood Glucose.: 92 mg/dL (10-17-22 @ 12:33)  POCT Blood Glucose.: 105 mg/dL (10-17-22 @ 11:11)  POCT Blood Glucose.: 80 mg/dL (10-17-22 @ 10:25)  POCT Blood Glucose.: 93 mg/dL (10-17-22 @ 09:10)  POCT Blood Glucose.: 113 mg/dL (10-17-22 @ 08:02)  POCT Blood Glucose.: 150 mg/dL (10-17-22 @ 06:33)  POCT Blood Glucose.: 124 mg/dL (10-17-22 @ 05:28)  POCT Blood Glucose.: 138 mg/dL (10-16-22 @ 23:54)  POCT Blood Glucose.: 117 mg/dL (10-16-22 @ 17:21)  POCT Blood Glucose.: 89 mg/dL (10-16-22 @ 11:45)  POCT Blood Glucose.: 154 mg/dL (10-16-22 @ 05:12)  POCT Blood Glucose.: 147 mg/dL (10-15-22 @ 23:55)  POCT Blood Glucose.: 185 mg/dL (10-15-22 @ 18:12)  POCT Blood Glucose.: 206 mg/dL (10-15-22 @ 12:06)  POCT Blood Glucose.: 104 mg/dL (10-15-22 @ 06:50)  POCT Blood Glucose.: 112 mg/dL (10-14-22 @ 23:56)                            9.7    14.40 )-----------( 321      ( 17 Oct 2022 00:32 )             32.6       10-17    137  |  99  |  26<H>  ----------------------------<  144<H>  4.1   |  27  |  0.45<L>    eGFR: 101    Ca    9.7      10-17  Mg     2.1     10-17  Phos  2.8     10-17    TPro  7.6  /  Alb  3.4  /  TBili  0.3  /  DBili  x   /  AST  15  /  ALT  32  /  AlkPhos  161<H>  10-17    Thyroid Function Tests:  10-03 @ 04:47 TSH 0.32 FreeT4 -- T3 -- Anti TPO -- Anti Thyroglobulin Ab -- TSI --      A1C with Estimated Average Glucose Result: 9.4 % (09-06-22 @ 16:46)      Estimated Average Glucose: 223 mg/dL (09-06-22 @ 16:46)

## 2022-10-17 NOTE — PROGRESS NOTE ADULT - PROBLEM SELECTOR PLAN 2
On chronic steroids at home: medrol 8mg qd   C/w Prednisone 8mg qd   Will need stress steroids if acutely ill

## 2022-10-17 NOTE — PROGRESS NOTE ADULT - SUBJECTIVE AND OBJECTIVE BOX
CHIEF COMPLAINT: Aortic dissection     Interval Events: No acute events.     REVIEW OF SYSTEMS:  See above. ROS otherwise negative.     OBJECTIVE:  ICU Vital Signs Last 24 Hrs  T(C): 36.6 (17 Oct 2022 08:00), Max: 37.1 (16 Oct 2022 12:00)  T(F): 97.8 (17 Oct 2022 08:00), Max: 98.8 (16 Oct 2022 12:00)  HR: 85 (17 Oct 2022 11:09) (47 - 94)  BP: --  BP(mean): --  ABP: 108/48 (17 Oct 2022 11:00) (99/51 - 154/56)  ABP(mean): 67 (17 Oct 2022 11:00) (62 - 91)  RR: 24 (17 Oct 2022 11:00) (16 - 38)  SpO2: 100% (17 Oct 2022 11:09) (97% - 100%)    O2 Parameters below as of 17 Oct 2022 11:09  Patient On (Oxygen Delivery Method): tracheostomy collar          Mode: standby, FiO2: 40    10-16 @ 07:01  -  10-17 @ 07:00  --------------------------------------------------------  IN: 2764 mL / OUT: 2760 mL / NET: 4 mL    10-17 @ 07:01  -  10-17 @ 11:36  --------------------------------------------------------  IN: 669 mL / OUT: 600 mL / NET: 69 mL      CAPILLARY BLOOD GLUCOSE      POCT Blood Glucose.: 105 mg/dL (17 Oct 2022 11:11)      PHYSICAL EXAM:  General:   HEENT:   Lymph Nodes:  Neck:   Respiratory:   Cardiovascular:   Abdomen:   Extremities:   Skin:   Neurological:  Psychiatry:    HOSPITAL MEDICATIONS:  MEDICATIONS  (STANDING):  acetylcysteine 10%  Inhalation 4 milliLiter(s) Inhalation every 12 hours  albuterol/ipratropium for Nebulization 3 milliLiter(s) Nebulizer every 6 hours  buDESOnide    Inhalation Suspension 0.5 milliGRAM(s) Inhalation every 12 hours  chlorhexidine 0.12% Liquid 15 milliLiter(s) Oral Mucosa every 12 hours  chlorhexidine 2% Cloths 1 Application(s) Topical <User Schedule>  collagenase Ointment 1 Application(s) Topical daily  dextrose 10%. 1000 milliLiter(s) (30 mL/Hr) IV Continuous <Continuous>  dextrose 50% Injectable 50 milliLiter(s) IV Push every 15 minutes  dextrose 50% Injectable 25 milliLiter(s) IV Push once  fluconAZOLE IVPB 600 milliGRAM(s) IV Intermittent every 24 hours  furosemide   Injectable 20 milliGRAM(s) IV Push every 12 hours  heparin  Infusion 1050 Unit(s)/Hr (11 mL/Hr) IV Continuous <Continuous>  hydrALAZINE 37.5 milliGRAM(s) Oral every 8 hours  insulin lispro (ADMELOG) corrective regimen sliding scale   SubCutaneous every 6 hours  magnesium oxide 400 milliGRAM(s) Oral every 8 hours  meropenem  IVPB 1000 milliGRAM(s) IV Intermittent every 8 hours  methylPREDNISolone 8 milliGRAM(s) Oral daily  metoprolol tartrate 12.5 milliGRAM(s) Enteral Tube every 12 hours  mexiletine 200 milliGRAM(s) Oral every 8 hours  multivitamin 1 Tablet(s) Oral daily  pantoprazole  Injectable 40 milliGRAM(s) IV Push daily  sodium chloride 0.9%. 1000 milliLiter(s) (10 mL/Hr) IV Continuous <Continuous>  tobramycin for Nebulization 300 milliGRAM(s) Inhalation every 12 hours  vancomycin  IVPB 750 milliGRAM(s) IV Intermittent every 12 hours  vancomycin  IVPB        MEDICATIONS  (PRN):  acetaminophen    Suspension .. 650 milliGRAM(s) Oral every 6 hours PRN Temp greater or equal to 38C (100.4F), Mild Pain (1 - 3)      LABS:                        9.7    14.40 )-----------( 321      ( 17 Oct 2022 00:32 )             32.6     Hgb Trend: 9.7<--, 10.1<--, 9.4<--, 7.8<--, 7.4<--  10-17    137  |  99  |  26<H>  ----------------------------<  144<H>  4.1   |  27  |  0.45<L>    Ca    9.7      17 Oct 2022 00:32  Phos  2.8     10-17  Mg     2.1     10-17    TPro  7.6  /  Alb  3.4  /  TBili  0.3  /  DBili  x   /  AST  15  /  ALT  32  /  AlkPhos  161<H>  10-17    Creatinine Trend: 0.45<--, 0.46<--, 0.41<--, 0.44<--, 0.47<--, 0.48<--  PT/INR - ( 17 Oct 2022 00:32 )   PT: 13.0 sec;   INR: 1.13 ratio         PTT - ( 17 Oct 2022 00:32 )  PTT:60.0 sec    Arterial Blood Gas:  10-17 @ 00:00  7.45/44/192/31/99.9/5.9  ABG lactate: --  Arterial Blood Gas:  10-16 @ 05:16  7.45/41/188/28/99.6/4.1  ABG lactate: --        MICROBIOLOGY:       RADIOLOGY:  [ ] Reviewed and interpreted by me    PULMONARY FUNCTION TESTS:    EKG: CHIEF COMPLAINT: Aortic dissection     Interval Events: No acute events.     REVIEW OF SYSTEMS:  See above. ROS otherwise negative. Feels ok. Having some pain at site of tracheostomy as she had sutures removed. No dyspnea. No fevers, chills, nausea, emesis, or diarrhea.    OBJECTIVE:  ICU Vital Signs Last 24 Hrs  T(C): 36.6 (17 Oct 2022 08:00), Max: 37.1 (16 Oct 2022 12:00)  T(F): 97.8 (17 Oct 2022 08:00), Max: 98.8 (16 Oct 2022 12:00)  HR: 85 (17 Oct 2022 11:09) (47 - 94)  BP: --  BP(mean): --  ABP: 108/48 (17 Oct 2022 11:00) (99/51 - 154/56)  ABP(mean): 67 (17 Oct 2022 11:00) (62 - 91)  RR: 24 (17 Oct 2022 11:00) (16 - 38)  SpO2: 100% (17 Oct 2022 11:09) (97% - 100%)    O2 Parameters below as of 17 Oct 2022 11:09  Patient On (Oxygen Delivery Method): tracheostomy collar          Mode: standby, FiO2: 40    10-16 @ 07:01  -  10-17 @ 07:00  --------------------------------------------------------  IN: 2764 mL / OUT: 2760 mL / NET: 4 mL    10-17 @ 07:01  -  10-17 @ 11:36  --------------------------------------------------------  IN: 669 mL / OUT: 600 mL / NET: 69 mL      CAPILLARY BLOOD GLUCOSE      POCT Blood Glucose.: 105 mg/dL (17 Oct 2022 11:11)      PHYSICAL EXAM:  General: Adult female sitting comfortably in chair, NAD  HEENT: NC/AT sclerae anicteric.   Neck: Trach in place, trach collar on  Respiratory: No increased WOB. Ronchorous breath sounds b/l  Cardiovascular: S1, S2  Abdomen: Soft, + BS  Extremities: WWP  Skin: Incision dressed  Neurological: Awake, alert, follows commands  Psychiatry: Appropriate affect    HOSPITAL MEDICATIONS:  MEDICATIONS  (STANDING):  acetylcysteine 10%  Inhalation 4 milliLiter(s) Inhalation every 12 hours  albuterol/ipratropium for Nebulization 3 milliLiter(s) Nebulizer every 6 hours  buDESOnide    Inhalation Suspension 0.5 milliGRAM(s) Inhalation every 12 hours  chlorhexidine 0.12% Liquid 15 milliLiter(s) Oral Mucosa every 12 hours  chlorhexidine 2% Cloths 1 Application(s) Topical <User Schedule>  collagenase Ointment 1 Application(s) Topical daily  dextrose 10%. 1000 milliLiter(s) (30 mL/Hr) IV Continuous <Continuous>  dextrose 50% Injectable 50 milliLiter(s) IV Push every 15 minutes  dextrose 50% Injectable 25 milliLiter(s) IV Push once  fluconAZOLE IVPB 600 milliGRAM(s) IV Intermittent every 24 hours  furosemide   Injectable 20 milliGRAM(s) IV Push every 12 hours  heparin  Infusion 1050 Unit(s)/Hr (11 mL/Hr) IV Continuous <Continuous>  hydrALAZINE 37.5 milliGRAM(s) Oral every 8 hours  insulin lispro (ADMELOG) corrective regimen sliding scale   SubCutaneous every 6 hours  magnesium oxide 400 milliGRAM(s) Oral every 8 hours  meropenem  IVPB 1000 milliGRAM(s) IV Intermittent every 8 hours  methylPREDNISolone 8 milliGRAM(s) Oral daily  metoprolol tartrate 12.5 milliGRAM(s) Enteral Tube every 12 hours  mexiletine 200 milliGRAM(s) Oral every 8 hours  multivitamin 1 Tablet(s) Oral daily  pantoprazole  Injectable 40 milliGRAM(s) IV Push daily  sodium chloride 0.9%. 1000 milliLiter(s) (10 mL/Hr) IV Continuous <Continuous>  tobramycin for Nebulization 300 milliGRAM(s) Inhalation every 12 hours  vancomycin  IVPB 750 milliGRAM(s) IV Intermittent every 12 hours  vancomycin  IVPB        MEDICATIONS  (PRN):  acetaminophen    Suspension .. 650 milliGRAM(s) Oral every 6 hours PRN Temp greater or equal to 38C (100.4F), Mild Pain (1 - 3)      LABS:                        9.7    14.40 )-----------( 321      ( 17 Oct 2022 00:32 )             32.6     Hgb Trend: 9.7<--, 10.1<--, 9.4<--, 7.8<--, 7.4<--  10-17    137  |  99  |  26<H>  ----------------------------<  144<H>  4.1   |  27  |  0.45<L>    Ca    9.7      17 Oct 2022 00:32  Phos  2.8     10-17  Mg     2.1     10-17    TPro  7.6  /  Alb  3.4  /  TBili  0.3  /  DBili  x   /  AST  15  /  ALT  32  /  AlkPhos  161<H>  10-17    Creatinine Trend: 0.45<--, 0.46<--, 0.41<--, 0.44<--, 0.47<--, 0.48<--  PT/INR - ( 17 Oct 2022 00:32 )   PT: 13.0 sec;   INR: 1.13 ratio         PTT - ( 17 Oct 2022 00:32 )  PTT:60.0 sec    Arterial Blood Gas:  10-17 @ 00:00  7.45/44/192/31/99.9/5.9  ABG lactate: --  Arterial Blood Gas:  10-16 @ 05:16  7.45/41/188/28/99.6/4.1  ABG lactate: --        MICROBIOLOGY:       RADIOLOGY:  [x ] Reviewed and interpreted by me    PULMONARY FUNCTION TESTS:    EKG:

## 2022-10-17 NOTE — PROGRESS NOTE ADULT - NUTRITIONAL ASSESSMENT
This patient has been assessed with a concern for Malnutrition and has been determined to have a diagnosis/diagnoses of Moderate protein-calorie malnutrition.    This patient is being managed with:   Diet NPO with Tube Feed-  Tube Feeding Modality: Nasogastric  Glucerna 1.2 Chago (GLUCERNARTH)  Total Volume for 24 Hours (mL): 1560  Continuous  Starting Tube Feed Rate {mL per Hour}: 65  Until Goal Tube Feed Rate (mL per Hour): 65  Tube Feed Duration (in Hours): 24  Tube Feed Start Time: 08:20  Entered: Oct 17 2022  8:23AM

## 2022-10-17 NOTE — CHART NOTE - NSCHARTNOTEFT_GEN_A_CORE
Nutrition Follow Up Note  Patient seen for: Nutrition Follow Up / Consult     Chart reviewed, events noted. Pt is a 70 yo F with PMH: DM, COPD, chronic adrenal insuffiencey on Prednisone, hx of colorectal cancer s/p resection (colostomy bag), hx of CAD, chronic AF on Eliquis, tracheomalacia s/p multiple intubations. Recent dx of R foot OM s/p debridement on antibiotics and presented to OSH with epigastric pain, belching and central chest pain. Found to have type A aortic dissection and transferred to [facility] for surgical evaluation. Pt s/p ascending aortic dissection s/p type A dissection repair and Biobentall on . S/P trach 10/10. On trach collar, CPAP overnight. Per MD note 10/17, plan to start back on low dose Beta-Blocker today. Continues on antibiotics as ordered for C. diff prophylaxis; plan to discontinue.     Source: [] Patient       [x] EMR        [x] RN        [] Family at bedside       [x] Other: interdisciplinary medical team    -If unable to interview patient: [x] Trach/Vent/BiPAP  [x] Disoriented/confused/inappropriate to interview    Diet Order:   Diet, NPO with Tube Feed:   Tube Feeding Modality: Nasogastric  Glucerna 1.2 Chago (GLUCERNARTH)  Total Volume for 24 Hours (mL): 1560  Continuous  Starting Tube Feed Rate {mL per Hour}: 65  Until Goal Tube Feed Rate (mL per Hour): 65  Tube Feed Duration (in Hours): 24  Tube Feed Start Time: 08:20 (10-17-)    EN Order Provides: 1560ml, 1872kcal and 94g protein      Is current diet order appropriate/adequate? [] Yes  []  No:     PO intake :   [] >75%  Adequate    [] 50-75%  Fair       [] <50%  Poor    Nutrition-related concerns:    GI:  Last BM ___.   Bowel Regimen? [] Yes   [] No  NGT Output:  IV Fluids:  Ostomy Output:  Fistula Output:     Weights:   Daily Weight in k.2 (10-17), Weight in k.4 (10-16), Weight in k.5 (10-15), Weight in k.9 (10-14), Weight in k.1 (10-13), Weight in k.9 (10-12), Weight in k.9 (10-11)    MEDICATIONS  (STANDING):  dextrose 10%.  dextrose 50% Injectable  dextrose 50% Injectable  fluconAZOLE IVPB  furosemide   Injectable  hydrALAZINE  insulin lispro (ADMELOG) corrective regimen sliding scale  magnesium oxide  meropenem  IVPB  methylPREDNISolone  metoprolol tartrate  mexiletine  multivitamin  pantoprazole  Injectable  sodium chloride 0.9%.  tobramycin for Nebulization  vancomycin  IVPB  vancomycin  IVPB    Pertinent Labs: 10-17 @ 00:32: Na 137, BUN 26<H>, Cr 0.45<L>, <H>, K+ 4.1, Phos 2.8, Mg 2.1, Alk Phos 161<H>, ALT/SGPT 32, AST/SGOT 15, HbA1c --    A1C with Estimated Average Glucose Result: 9.4 % (22 @ 16:46)  A1C with Estimated Average Glucose Result: 9.2 % (22 @ 07:36)  A1C with Estimated Average Glucose Result: 8.0 % (05-10-22 @ 12:26)    Finger Sticks:  POCT Blood Glucose.: 105 mg/dL (10-17 @ 11:11)  POCT Blood Glucose.: 80 mg/dL (10-17 @ 10:25)  POCT Blood Glucose.: 93 mg/dL (10-17 @ 09:10)  POCT Blood Glucose.: 113 mg/dL (10-17 @ 08:02)  POCT Blood Glucose.: 150 mg/dL (10-17 @ 06:33)  POCT Blood Glucose.: 124 mg/dL (10-17 @ 05:28)  POCT Blood Glucose.: 138 mg/dL (10-16 @ 23:54)  POCT Blood Glucose.: 117 mg/dL (10-16 @ 17:21)        Skin per nursing documentation:   Edema:     Estimated Energy Needs:  Estimated Protein Needs:  Estimated Fluid Needs:   Kyle State Equation:    Previous Nutrition Diagnosis:   Nutrition Diagnosis is: [] ongoing  [] resolved [] not applicable     New Nutrition Diagnosis: [] Not applicable    Nutrition Care Plan:  [] In Progress  [] Achieved  [] Not applicable    Nutrition Interventions:     Education Provided:       [] Yes:  [] No:        Recommendations:         [] Continue current diet order            [] Add oral nutrition supplement:     [] Discontinue current diet order. Recommend:      [] Add micronutrient supplementation:      [] Continue current micronutrient supplementation:      [] Other:     Monitoring and Evaluation:   Continue to monitor nutritional intake, tolerance to diet prescription, weights, labs, skin integrity      RD remains available upon request and will follow up per protocol Nutrition Follow Up Note  Patient seen for: Nutrition Follow Up / Consult     Chart reviewed, events noted. Pt is a 72 yo F with PMH: DM, COPD, chronic adrenal insuffiencey on Prednisone, hx of colorectal cancer s/p resection (colostomy bag), hx of CAD, chronic AF on Eliquis, tracheomalacia s/p multiple intubations. Recent dx of R foot OM s/p debridement on antibiotics and presented to OSH with epigastric pain, belching and central chest pain. Found to have type A aortic dissection and transferred to [facility] for surgical evaluation. Pt s/p ascending aortic dissection s/p type A dissection repair and Biobentall on . S/P trach 10/10. On trach collar, CPAP overnight. Per MD note 10/17, plan to start back on low dose Beta-Blocker today. Continues on antibiotics as ordered.     Source: [] Patient       [x] EMR        [x] RN        [] Family at bedside       [x] Other: interdisciplinary medical team    -If unable to interview patient: [x] Trach/Vent/BiPAP  [x] Disoriented/confused/inappropriate to interview    Diet Order:   Diet, NPO with Tube Feed:   Tube Feeding Modality: Nasogastric  Glucerna 1.2 Chago (GLUCERNARTH)  Total Volume for 24 Hours (mL): 1560  Continuous  Starting Tube Feed Rate {mL per Hour}: 65  Until Goal Tube Feed Rate (mL per Hour): 65  Tube Feed Duration (in Hours): 24  Tube Feed Start Time: 08:20 (10-17-22)    EN Order Provides: 1560ml, 1872kcal and 94g protein  -Pt previously prescribed Glucerna 1.5 at 55ml/hr x 24 hr (changed to Glucerna 1.2 this AM in setting of low stock of Glucerna 1.5 available in-house). See below for updated EN recommendations.     EN Provision (per nursing flow sheet):   (10/17): feeds infusing at 65ml/hr   (10/16): 100% of goal  (10/15): 96% of goal  (10/14): 100% of goal  (10/13): 100% of goal    Nutrition-related concerns:  -GI: Colostomy output: (10/17): 200ml (thus far); (10/16): 100ml   -Endo: NPH discontinued temporarily due to pt receiving extra dose of insulin this AM. Previously had been prescribed NPH 22u q 6 hrs. Now prescribed insulin lispro sliding scale. BG 80 --> 105mg/dL this AM. On IVF D10%, NaCl 0.9%.    -Continues on Lasix 20mg IV push q 12 hrs for diuresis. At risk for wt fluctuations/fluid shifts. Noted with edema (see below).   -Continues on antibiotics as prescribed.     Weights:   Daily Weight in k.2 (10-17), Weight in k.4 (10-16), Weight in k.5 (10-15), Weight in k.9 (10-14), Weight in k.1 (10-13), Weight in k.9 (10-12), Weight in k.9 (10-11)    MEDICATIONS  (STANDING):  dextrose 10%.  dextrose 50% Injectable  dextrose 50% Injectable  fluconAZOLE IVPB  furosemide   Injectable  hydrALAZINE  insulin lispro (ADMELOG) corrective regimen sliding scale  magnesium oxide  meropenem  IVPB  methylPREDNISolone  metoprolol tartrate  mexiletine  multivitamin  pantoprazole  Injectable  sodium chloride 0.9%.  tobramycin for Nebulization  vancomycin  IVPB  vancomycin  IVPB    Pertinent Labs: 10-17 @ 00:32: Na 137, BUN 26<H>, Cr 0.45<L>, <H>, K+ 4.1, Phos 2.8, Mg 2.1, Alk Phos 161<H>, ALT/SGPT 32, AST/SGOT 15, HbA1c --    A1C with Estimated Average Glucose Result: 9.4 % (22 @ 16:46)  A1C with Estimated Average Glucose Result: 9.2 % (22 @ 07:36)  A1C with Estimated Average Glucose Result: 8.0 % (05-10-22 @ 12:26)    Finger Sticks:  POCT Blood Glucose.: 105 mg/dL (10-17 @ 11:11)  POCT Blood Glucose.: 80 mg/dL (10-17 @ 10:25)  POCT Blood Glucose.: 93 mg/dL (10-17 @ 09:10)  POCT Blood Glucose.: 113 mg/dL (10-17 @ 08:02)  POCT Blood Glucose.: 150 mg/dL (10-17 @ 06:33)  POCT Blood Glucose.: 124 mg/dL (10-17 @ 05:28)  POCT Blood Glucose.: 138 mg/dL (10-16 @ 23:54)  POCT Blood Glucose.: 117 mg/dL (10-16 @ 17:21)    Skin per nursing documentation: surgical incision midsternum; skin tear right elbow; right lateral foot OM; left lateral ankle wound; bilateral buttocks suspected DTI  Edema: 1+ generalized; 2+ right upper extremity    Based on UBW 62.3kg with consideration for trach to vent  Estimated Energy Needs: 1682 - 1994kcal (27 - 32kcal/kg)   Estimated Protein Needs: 87 - 112g (1.4 - 1.8g/kg)  Estimated Fluid Needs: per team.    Previous Nutrition Diagnosis: Moderate Acute Malnutrition   Nutrition Diagnosis is: [x] ongoing  [] resolved [] not applicable     New Nutrition Diagnosis: n/a    Nutrition Care Plan:  [x] In Progress - being addressed with EN as feasible  [] Achieved  [] Not applicable    Nutrition Interventions:     Education Provided:       [] Yes:  [x] No: N/A    Nutrition Care Plan:  [x] In Progress  [] Achieved  [] Not applicable       Recommendations:      1. May continue Glucerna 1.2 at current goal rate 65ml/hr x 24 hrs. To provide: 1560ml, 1872kcal and 94g protein. Based on UBW (62.3kg): 30kcal/kg and 1.5g protein/kg.   -->As able, resume Glucerna 1.5 at 55ml/hr x 24 hrs. Provides: 1320mL total volume, 1980kcal, 109g protein, 1002mL free water. Based on UBW 62.3kg, provides 31kcal/kg, 1.7g/kg protein based on UBW 62.3kg.   -->Defer additional free water flushes to team.  2. Monitor GI tolerance. RD to remain available to adjust EN formulary, volume/rate PRN.   3. Antihyperglycemic regimen deferred to team.   4. Continue multivitamin as medically feasible.     Monitoring and Evaluation:   Continue to monitor nutritional intake, tolerance to diet prescription, weights, labs, skin integrity    RD remains available upon request and will follow up per protocol    Alison Kleiner, RD, [facility] Pager # [phone]

## 2022-10-17 NOTE — PROGRESS NOTE ADULT - PROBLEM SELECTOR PLAN 1
-test BG q6h if NPO/TF   -Decreased NPH dose to 18 units q 6h while on TFs. Administer 11 untis if BG <120. HOLD IF TFS ARE OFF.  -C/w Admelog moderate correction scale q6h for now  Discharge plan:  - Likely to discharge patient home on basal/bolus insulin. Final regimen pending clinical course.  - Recommend routine outpatient ophthalmology, podiatry  - Can f/u with endocrinologist Dr. aSavedra.  - Make sure pt has Rx for all DM supplies and insulin/ DM meds.  -Based on pt's clinical condition and mental status at this time she is not able to independently manage her DM. Will evaluate pt's ability to manage DM at time of discharge. If unable> pt will need family/ care giver (s) to manage DM care at home  -Will need rehab.

## 2022-10-18 LAB
ALBUMIN SERPL ELPH-MCNC: 3.3 G/DL — SIGNIFICANT CHANGE UP (ref 3.3–5)
ALP SERPL-CCNC: 146 U/L — HIGH (ref 40–120)
ALT FLD-CCNC: 25 U/L — SIGNIFICANT CHANGE UP (ref 10–45)
ANION GAP SERPL CALC-SCNC: 10 MMOL/L — SIGNIFICANT CHANGE UP (ref 5–17)
APTT BLD: 61.3 SEC — HIGH (ref 27.5–35.5)
APTT BLD: 71.7 SEC — HIGH (ref 27.5–35.5)
APTT BLD: 71.9 SEC — HIGH (ref 27.5–35.5)
AST SERPL-CCNC: 16 U/L — SIGNIFICANT CHANGE UP (ref 10–40)
BILIRUB SERPL-MCNC: 0.3 MG/DL — SIGNIFICANT CHANGE UP (ref 0.2–1.2)
BLD GP AB SCN SERPL QL: NEGATIVE — SIGNIFICANT CHANGE UP
BUN SERPL-MCNC: 22 MG/DL — SIGNIFICANT CHANGE UP (ref 7–23)
CALCIUM SERPL-MCNC: 9.5 MG/DL — SIGNIFICANT CHANGE UP (ref 8.4–10.5)
CHLORIDE SERPL-SCNC: 100 MMOL/L — SIGNIFICANT CHANGE UP (ref 96–108)
CO2 SERPL-SCNC: 28 MMOL/L — SIGNIFICANT CHANGE UP (ref 22–31)
CREAT SERPL-MCNC: 0.4 MG/DL — LOW (ref 0.5–1.3)
CULTURE RESULTS: SIGNIFICANT CHANGE UP
CULTURE RESULTS: SIGNIFICANT CHANGE UP
EGFR: 104 ML/MIN/1.73M2 — SIGNIFICANT CHANGE UP
GAS PNL BLDA: SIGNIFICANT CHANGE UP
GLUCOSE BLDC GLUCOMTR-MCNC: 147 MG/DL — HIGH (ref 70–99)
GLUCOSE BLDC GLUCOMTR-MCNC: 157 MG/DL — HIGH (ref 70–99)
GLUCOSE BLDC GLUCOMTR-MCNC: 219 MG/DL — HIGH (ref 70–99)
GLUCOSE BLDC GLUCOMTR-MCNC: 284 MG/DL — HIGH (ref 70–99)
GLUCOSE SERPL-MCNC: 150 MG/DL — HIGH (ref 70–99)
HCT VFR BLD CALC: 29.8 % — LOW (ref 34.5–45)
HGB BLD-MCNC: 9.1 G/DL — LOW (ref 11.5–15.5)
INR BLD: 1.14 RATIO — SIGNIFICANT CHANGE UP (ref 0.88–1.16)
MAGNESIUM SERPL-MCNC: 1.8 MG/DL — SIGNIFICANT CHANGE UP (ref 1.6–2.6)
MCHC RBC-ENTMCNC: 23.9 PG — LOW (ref 27–34)
MCHC RBC-ENTMCNC: 30.5 GM/DL — LOW (ref 32–36)
MCV RBC AUTO: 78.2 FL — LOW (ref 80–100)
NRBC # BLD: 0 /100 WBCS — SIGNIFICANT CHANGE UP (ref 0–0)
ORGANISM # SPEC MICROSCOPIC CNT: SIGNIFICANT CHANGE UP
ORGANISM # SPEC MICROSCOPIC CNT: SIGNIFICANT CHANGE UP
PHOSPHATE SERPL-MCNC: 3.1 MG/DL — SIGNIFICANT CHANGE UP (ref 2.5–4.5)
PLATELET # BLD AUTO: 311 K/UL — SIGNIFICANT CHANGE UP (ref 150–400)
POTASSIUM SERPL-MCNC: 4.3 MMOL/L — SIGNIFICANT CHANGE UP (ref 3.5–5.3)
POTASSIUM SERPL-SCNC: 4.3 MMOL/L — SIGNIFICANT CHANGE UP (ref 3.5–5.3)
PROT SERPL-MCNC: 7.1 G/DL — SIGNIFICANT CHANGE UP (ref 6–8.3)
PROTHROM AB SERPL-ACNC: 13.3 SEC — SIGNIFICANT CHANGE UP (ref 10.5–13.4)
RBC # BLD: 3.81 M/UL — SIGNIFICANT CHANGE UP (ref 3.8–5.2)
RBC # FLD: 24.1 % — HIGH (ref 10.3–14.5)
RH IG SCN BLD-IMP: POSITIVE — SIGNIFICANT CHANGE UP
SODIUM SERPL-SCNC: 138 MMOL/L — SIGNIFICANT CHANGE UP (ref 135–145)
SPECIMEN SOURCE: SIGNIFICANT CHANGE UP
SPECIMEN SOURCE: SIGNIFICANT CHANGE UP
VANCOMYCIN TROUGH SERPL-MCNC: 15.1 UG/ML — SIGNIFICANT CHANGE UP (ref 10–20)
WBC # BLD: 12.98 K/UL — HIGH (ref 3.8–10.5)
WBC # FLD AUTO: 12.98 K/UL — HIGH (ref 3.8–10.5)

## 2022-10-18 PROCEDURE — 71045 X-RAY EXAM CHEST 1 VIEW: CPT | Mod: 26

## 2022-10-18 PROCEDURE — 99291 CRITICAL CARE FIRST HOUR: CPT | Mod: 24

## 2022-10-18 PROCEDURE — 99232 SBSQ HOSP IP/OBS MODERATE 35: CPT

## 2022-10-18 RX ORDER — MAGNESIUM SULFATE 500 MG/ML
2 VIAL (ML) INJECTION ONCE
Refills: 0 | Status: COMPLETED | OUTPATIENT
Start: 2022-10-18 | End: 2022-10-18

## 2022-10-18 RX ORDER — HUMAN INSULIN 100 [IU]/ML
18 INJECTION, SUSPENSION SUBCUTANEOUS EVERY 6 HOURS
Refills: 0 | Status: DISCONTINUED | OUTPATIENT
Start: 2022-10-18 | End: 2022-10-19

## 2022-10-18 RX ORDER — FUROSEMIDE 40 MG
40 TABLET ORAL
Refills: 0 | Status: DISCONTINUED | OUTPATIENT
Start: 2022-10-18 | End: 2022-10-23

## 2022-10-18 RX ADMIN — Medication 4 MILLILITER(S): at 17:35

## 2022-10-18 RX ADMIN — PANTOPRAZOLE SODIUM 40 MILLIGRAM(S): 20 TABLET, DELAYED RELEASE ORAL at 13:55

## 2022-10-18 RX ADMIN — Medication 37.5 MILLIGRAM(S): at 13:56

## 2022-10-18 RX ADMIN — Medication 250 MILLIGRAM(S): at 17:42

## 2022-10-18 RX ADMIN — MEXILETINE HYDROCHLORIDE 200 MILLIGRAM(S): 150 CAPSULE ORAL at 13:56

## 2022-10-18 RX ADMIN — MEXILETINE HYDROCHLORIDE 200 MILLIGRAM(S): 150 CAPSULE ORAL at 21:20

## 2022-10-18 RX ADMIN — Medication 4: at 05:17

## 2022-10-18 RX ADMIN — Medication 4 MILLILITER(S): at 05:43

## 2022-10-18 RX ADMIN — MAGNESIUM OXIDE 400 MG ORAL TABLET 400 MILLIGRAM(S): 241.3 TABLET ORAL at 05:15

## 2022-10-18 RX ADMIN — Medication 650 MILLIGRAM(S): at 03:02

## 2022-10-18 RX ADMIN — CHLORHEXIDINE GLUCONATE 15 MILLILITER(S): 213 SOLUTION TOPICAL at 17:43

## 2022-10-18 RX ADMIN — MEROPENEM 100 MILLIGRAM(S): 1 INJECTION INTRAVENOUS at 00:06

## 2022-10-18 RX ADMIN — MEROPENEM 100 MILLIGRAM(S): 1 INJECTION INTRAVENOUS at 05:41

## 2022-10-18 RX ADMIN — Medication 0.5 MILLIGRAM(S): at 17:36

## 2022-10-18 RX ADMIN — FLUCONAZOLE 150 MILLIGRAM(S): 150 TABLET ORAL at 21:19

## 2022-10-18 RX ADMIN — Medication 3 MILLILITER(S): at 05:42

## 2022-10-18 RX ADMIN — MAGNESIUM OXIDE 400 MG ORAL TABLET 400 MILLIGRAM(S): 241.3 TABLET ORAL at 13:57

## 2022-10-18 RX ADMIN — Medication 2: at 23:50

## 2022-10-18 RX ADMIN — Medication 37.5 MILLIGRAM(S): at 21:19

## 2022-10-18 RX ADMIN — Medication 1 TABLET(S): at 13:56

## 2022-10-18 RX ADMIN — Medication 12.5 MILLIGRAM(S): at 05:15

## 2022-10-18 RX ADMIN — HEPARIN SODIUM 9 UNIT(S)/HR: 5000 INJECTION INTRAVENOUS; SUBCUTANEOUS at 17:41

## 2022-10-18 RX ADMIN — MAGNESIUM OXIDE 400 MG ORAL TABLET 400 MILLIGRAM(S): 241.3 TABLET ORAL at 21:20

## 2022-10-18 RX ADMIN — Medication 0.5 MILLIGRAM(S): at 05:43

## 2022-10-18 RX ADMIN — HUMAN INSULIN 18 UNIT(S): 100 INJECTION, SUSPENSION SUBCUTANEOUS at 12:27

## 2022-10-18 RX ADMIN — MEXILETINE HYDROCHLORIDE 200 MILLIGRAM(S): 150 CAPSULE ORAL at 05:15

## 2022-10-18 RX ADMIN — Medication 40 MILLIGRAM(S): at 13:55

## 2022-10-18 RX ADMIN — Medication 650 MILLIGRAM(S): at 03:32

## 2022-10-18 RX ADMIN — MEROPENEM 100 MILLIGRAM(S): 1 INJECTION INTRAVENOUS at 21:20

## 2022-10-18 RX ADMIN — Medication 37.5 MILLIGRAM(S): at 05:16

## 2022-10-18 RX ADMIN — MEROPENEM 100 MILLIGRAM(S): 1 INJECTION INTRAVENOUS at 13:57

## 2022-10-18 RX ADMIN — Medication 12.5 MILLIGRAM(S): at 17:42

## 2022-10-18 RX ADMIN — Medication 25 GRAM(S): at 02:05

## 2022-10-18 RX ADMIN — Medication 3 MILLILITER(S): at 00:32

## 2022-10-18 RX ADMIN — CHLORHEXIDINE GLUCONATE 15 MILLILITER(S): 213 SOLUTION TOPICAL at 05:20

## 2022-10-18 RX ADMIN — Medication 6: at 12:27

## 2022-10-18 RX ADMIN — HUMAN INSULIN 11 UNIT(S): 100 INJECTION, SUSPENSION SUBCUTANEOUS at 00:06

## 2022-10-18 RX ADMIN — CHLORHEXIDINE GLUCONATE 1 APPLICATION(S): 213 SOLUTION TOPICAL at 06:00

## 2022-10-18 RX ADMIN — Medication 250 MILLIGRAM(S): at 06:25

## 2022-10-18 RX ADMIN — Medication 40 MILLIGRAM(S): at 02:58

## 2022-10-18 RX ADMIN — Medication 3 MILLILITER(S): at 17:34

## 2022-10-18 RX ADMIN — HUMAN INSULIN 11 UNIT(S): 100 INJECTION, SUSPENSION SUBCUTANEOUS at 05:16

## 2022-10-18 RX ADMIN — HUMAN INSULIN 18 UNIT(S): 100 INJECTION, SUSPENSION SUBCUTANEOUS at 23:49

## 2022-10-18 RX ADMIN — Medication 3 MILLILITER(S): at 13:09

## 2022-10-18 RX ADMIN — HUMAN INSULIN 18 UNIT(S): 100 INJECTION, SUSPENSION SUBCUTANEOUS at 17:44

## 2022-10-18 RX ADMIN — Medication 8 MILLIGRAM(S): at 05:15

## 2022-10-18 NOTE — PROGRESS NOTE ADULT - SUBJECTIVE AND OBJECTIVE BOX
Patient seen and examined at the bedside.    Remained critically ill on continuous ICU monitoring.    OBJECTIVE:  Vital Signs Last 24 Hrs  T(C): 36.1 (18 Oct 2022 04:00), Max: 36.6 (17 Oct 2022 08:00)  T(F): 97 (18 Oct 2022 04:00), Max: 97.9 (17 Oct 2022 20:00)  HR: 73 (18 Oct 2022 04:00) (73 - 99)  BP: --  BP(mean): --  RR: 24 (18 Oct 2022 04:00) (14 - 30)  SpO2: 100% (18 Oct 2022 04:00) (98% - 100%)    Parameters below as of 18 Oct 2022 04:00  Patient On (Oxygen Delivery Method): ventilator    O2 Concentration (%): 40      Physical Exam:   General: OOBTC, NAD  Neurology: interactive, able to follow simple commands, denies pain  ENT/Neck: + trach c/d/i, neck supple, trachea midline   Respiratory: coarse BS b/l, rhonchi throughout. + Coughing thick secretions  CV: S1S2, no murmurs        [x] AFib  Abdominal: Soft, NT, ND, colostomy in place  Extremities: +4 RUE edema, 3+LUE edema, trace edema noted, + peripheral pulses   Skin: Dry dressing over right heel, R elbow wound w/ overlying cling dressing c/d/i                            Assessment:  71F PMH DM, COPD, chronic adrenal insufficiency on Prednisone, history of colorectal cancer s/p resection (colostomy bag), Hx of CAD, chronic AF on Eliquis, and tracheomalacia s/p multiple intubations, recent dx of R foot OM s/p debridement on antibiotics and presented to ED at [facility] this morning with epigastric pain, belching and central chest pain. Found on CTA to have type A aortic dissection and transferred to [facility] for surgical evaluation by Dr. Cabrera.     Ascending aortic dissection s/p type A dissection repair and Biobentall on 9/7   Respiratory failure s/p Trach 10/10/22  Hypovolemia   Post op respiratory failure   Acute blood loss anemia  Stress hyperglycemia   Leukocytosis   RIJ thrombus  MRSA PNA        Plan:   ***Neuro***  Off sedation, follows simple commands, continue to monitor  PT/OT progress as tolerated      ***Cardiovascular***  Limited TTE on 10/1: EF 69%, Hyperdynamic left ventricular systolic function. There is hypokinesis of the mid-base inferior wall. Nml RV fxn.   CT chest on 9/28: No CT evidence of pulmonary embolism. Status post repair of ascending aortic dissection with a stable dissection flap originating from the aortic arch and extending into the infrarenal abdominal aorta,  B/l UE duplex on 10/5: As before, there is an occluded RIGHT IJ vein with thrombosis extending to brachiocephalic vein. Superficial thrombophlebitis of the LEFT cephalic vein. remains on heparin drip PTT goal (50-60)  Invasive hemodynamic monitoring, assess perfusion indices   SR / CVP 1/ MAP 76/ Hct 29.8/ Lactate 1.7  [x] Hydralazine PO for BP management up-titrated to 37.5 TID  Rate control with Mexiletine and Lopressor   [x] AC Therapy with Heparin PTT 50-60 (for Afib and IJ thrombus)  Reassessment of hemodynamics  Serial EKG and cardiac enzymes     ***Pulmonary***  Respiratory failure s/p Trach #8 portex  10/10/22, per ENT inner cannula needs to be changed daily, trach sutures d/c today by ENT  Post op vent management   Titration of FiO2 and PEEP, follow SpO2, CXR, blood gasses   Bronched 10/13  CPAP/TC trials as tolerated  TC since 5 AM yesterday  CPAP overnight 10/17   Ambu bag and suction as needed for thick secretions, continue mucomyst/duonebs      Mode: CPAP with PS  FiO2: 40  PEEP: 5  PS: 12  MAP: 9  PIP: 18              ***GI***  [x] Diet:  TFs, Glucerna 1.2 @ 65cc/hr  [x] Protonix       ***Renal***  Continue to monitor I/Os, BUN/Creatinine.   Replete lytes PRN  Diuresis with Lasix       ***ID***  SCx on 10/4+Methicillin resistant Staphylococcus aureus, c/w IVPB Vancomycin, (plan for 6 week course in setting of valve surgery, 11/26end date)  9/27 blood culture Neela Glabarata, on flucanazole (lifelong suppression)  PO Vancomycin solution for C.diff prophylaxis   R elbow wound, S/p IR for elbow drainage 10/13 + Few Proteus mirabilis ESBL, continue with meropenum 7 day trial (10/12/2022-10/19/2022)  BCx 10/13 NGTD      ***Endocrine***  [x]  DM : HbA1c 9.4%                - [x] ISS  [x] NPH             - Need tight glycemic control to prevent wound infection.          Patient requires continuous monitoring with bedside rhythm monitoring, pulse oximetry monitoring, and continuous central venous and arterial pressure monitoring; and intermittent blood gas analysis. Care plan discussed with the ICU care team.   Patient remained critical, at risk for life threatening decompensation.    I have spent 30 minutes providing critical care management to this patient.    By signing my name below, I, Kieran Plascencia, attest that this documentation has been prepared under the direction and in the presence of Edson Chris NP   Electronically signed: Georgi Cordero, 10-18-22 @ 07:25    I, Edson Chris NP, personally performed the services described in this documentation. all medical record entries made by the scribe were at my direction and in my presence. I have reviewed the chart and agree that the record reflects my personal performance and is accurate and complete  Electronically signed: Edson Chris NP  Patient seen and examined at the bedside.    Remained critically ill on continuous ICU monitoring.    OBJECTIVE:  Vital Signs Last 24 Hrs  T(C): 36.1 (18 Oct 2022 04:00), Max: 36.6 (17 Oct 2022 08:00)  T(F): 97 (18 Oct 2022 04:00), Max: 97.9 (17 Oct 2022 20:00)  HR: 73 (18 Oct 2022 04:00) (73 - 99)  BP: --  BP(mean): --  RR: 24 (18 Oct 2022 04:00) (14 - 30)  SpO2: 100% (18 Oct 2022 04:00) (98% - 100%)    Parameters below as of 18 Oct 2022 04:00  Patient On (Oxygen Delivery Method): ventilator    O2 Concentration (%): 40      Physical Exam:   General: OOBTC, NAD  Neurology: interactive, able to follow simple commands, denies pain  ENT/Neck: + trach c/d/i, neck supple, trachea midline   Respiratory: coarse BS b/l, rhonchi throughout. + Coughing thick secretions  CV: S1S2, no murmurs        [x] AFib  Abdominal: Soft, NT, ND, colostomy in place  Extremities: +4 RUE edema, 3+LUE edema, trace edema noted, + peripheral pulses   Skin: Dry dressing over right heel, R elbow wound w/ overlying cling dressing c/d/i                            Assessment:  71F PMH DM, COPD, chronic adrenal insufficiency on Prednisone, history of colorectal cancer s/p resection (colostomy bag), Hx of CAD, chronic AF on Eliquis, and tracheomalacia s/p multiple intubations, recent dx of R foot OM s/p debridement on antibiotics and presented to ED at [facility] this morning with epigastric pain, belching and central chest pain. Found on CTA to have type A aortic dissection and transferred to [facility] for surgical evaluation by Dr. Cabrera.     Ascending aortic dissection s/p type A dissection repair and Biobentall on 9/7   Respiratory failure s/p Trach 10/10/22  Hypovolemia   Post op respiratory failure   Acute blood loss anemia  Stress hyperglycemia   Leukocytosis   RIJ thrombus  MRSA PNA        Plan:   ***Neuro***  Off sedation, follows simple commands, continue to monitor  PT/OT progress as tolerated      ***Cardiovascular***  Limited TTE on 10/1: EF 69%, Hyperdynamic left ventricular systolic function. There is hypokinesis of the mid-base inferior wall. Nml RV fxn.   CT chest on 9/28: No CT evidence of pulmonary embolism. Status post repair of ascending aortic dissection with a stable dissection flap originating from the aortic arch and extending into the infrarenal abdominal aorta,  B/l UE duplex on 10/5: As before, there is an occluded RIGHT IJ vein with thrombosis extending to brachiocephalic vein. Superficial thrombophlebitis of the LEFT cephalic vein. remains on heparin drip PTT goal (50-60)  Invasive hemodynamic monitoring, assess perfusion indices   SR / CVP 1/ MAP 76/ Hct 29.8/ Lactate 1.7  [x] Hydralazine PO for BP management up-titrated to 37.5 TID  Rate control with Mexiletine and Lopressor   [x] AC Therapy with Heparin PTT 50-60 (for Afib and IJ thrombus)  Reassessment of hemodynamics  Serial EKG and cardiac enzymes     ***Pulmonary***  Respiratory failure s/p Trach #8 portex  10/10/22, per ENT inner cannula needs to be changed daily, trach sutures d/c'ed yesterday by ENT  Post op vent management   Titration of FiO2 and PEEP, follow SpO2, CXR, blood gasses   Bronched 10/13  CPAP/TC trials as tolerated  TC since 5 AM yesterday  CPAP overnight 10/17   Ambu bag and suction as needed for thick secretions, continue mucomyst/duonebs      Mode: CPAP with PS  FiO2: 40  PEEP: 5  PS: 12  MAP: 9  PIP: 18              ***GI***  [x] Diet:  TFs, Glucerna 1.2 @ 65cc/hr  [x] Protonix       ***Renal***  Continue to monitor I/Os, BUN/Creatinine.   Replete lytes PRN  Diuresis with Lasix       ***ID***  SCx on 10/4+Methicillin resistant Staphylococcus aureus, c/w IVPB Vancomycin, (plan for 6 week course in setting of valve surgery, 11/26end date)  9/27 blood culture Neela Glabarata, on flucanazole (lifelong suppression)  PO Vancomycin solution for C.diff prophylaxis   R elbow wound, S/p IR for elbow drainage 10/13 + Few Proteus mirabilis ESBL, continue with meropenum 7 day trial (10/12/2022-10/19/2022)  BCx 10/13 NGTD      ***Endocrine***  [x]  DM : HbA1c 9.4%                - [x] ISS  [x] NPH             - Need tight glycemic control to prevent wound infection.          Patient requires continuous monitoring with bedside rhythm monitoring, pulse oximetry monitoring, and continuous central venous and arterial pressure monitoring; and intermittent blood gas analysis. Care plan discussed with the ICU care team.   Patient remained critical, at risk for life threatening decompensation.    I have spent 30 minutes providing critical care management to this patient.    By signing my name below, I, Kieran Plascencia, attest that this documentation has been prepared under the direction and in the presence of Edson Chris NP   Electronically signed: Georgi Cordero, 10-18-22 @ 07:25    I, Edson Chris NP, personally performed the services described in this documentation. all medical record entries made by the marcyibe were at my direction and in my presence. I have reviewed the chart and agree that the record reflects my personal performance and is accurate and complete  Electronically signed: Edson Chris NP  Patient seen and examined at the bedside.    Remained critically ill on continuous ICU monitoring.    OBJECTIVE:  Vital Signs Last 24 Hrs  T(C): 36.1 (18 Oct 2022 04:00), Max: 36.6 (17 Oct 2022 08:00)  T(F): 97 (18 Oct 2022 04:00), Max: 97.9 (17 Oct 2022 20:00)  HR: 73 (18 Oct 2022 04:00) (73 - 99)  RR: 24 (18 Oct 2022 04:00) (14 - 30)  SpO2: 100% (18 Oct 2022 04:00) (98% - 100%)    Parameters below as of 18 Oct 2022 04:00  Patient On (Oxygen Delivery Method): ventilator    O2 Concentration (%): 40      Physical Exam:   General: OOBTC, NAD  Neurology: interactive, able to follow simple commands, denies pain  ENT/Neck: + trach c/d/i, neck supple, trachea midline   Respiratory: coarse BS b/l, rhonchi throughout.   CV: S1S2, no murmurs        [x] AFib  Abdominal: Soft, NT, ND, colostomy in place  Extremities: +4 RUE edema, 3+LUE edema, trace edema noted, + peripheral pulses   Skin: Dry dressing over right heel, R elbow wound w/ overlying cling dressing c/d/i                            Assessment:  71F PMH DM, COPD, chronic adrenal insufficiency on Prednisone, history of colorectal cancer s/p resection (colostomy bag), Hx of CAD, chronic AF on Eliquis, and tracheomalacia s/p multiple intubations, recent dx of R foot OM s/p debridement on antibiotics and presented to ED at [facility] this morning with epigastric pain, belching and central chest pain. Found on CTA to have type A aortic dissection and transferred to [facility] for surgical evaluation by Dr. Cabrera.     Ascending aortic dissection s/p type A dissection repair and Biobentall on 9/7   Respiratory failure s/p Trach 10/10/22  Hypovolemia   Post op respiratory failure   Acute blood loss anemia  Stress hyperglycemia   Leukocytosis   RIJ thrombus  MRSA PNA        Plan:   ***Neuro***  Off sedation, follows simple commands, continue to monitor  PT/OT progress as tolerated      ***Cardiovascular***  Limited TTE on 10/1: EF 69%, Hyperdynamic left ventricular systolic function. There is hypokinesis of the mid-base inferior wall. Nml RV fxn.   CT chest on 9/28: No CT evidence of pulmonary embolism. Status post repair of ascending aortic dissection with a stable dissection flap originating from the aortic arch and extending into the infrarenal abdominal aorta,  B/l UE duplex on 10/5: As before, there is an occluded RIGHT IJ vein with thrombosis extending to brachiocephalic vein. Superficial thrombophlebitis of the LEFT cephalic vein. remains on heparin drip PTT goal (50-60)  Invasive hemodynamic monitoring, assess perfusion indices   SR / CVP 1/ MAP 76/ Hct 29.8/ Lactate 1.7  [x] Hydralazine PO for BP management up-titrated to 37.5 TID  Rate control with Mexiletine and Lopressor   [x] AC Therapy with Heparin PTT 50-60 (for Afib and IJ thrombus)  Reassessment of hemodynamics    ***Pulmonary***  Respiratory failure s/p Trach #8 portex  10/10/22, per ENT inner cannula needs to be changed daily, trach sutures d/c'ed yesterday by ENT  Post op vent management   Titration of FiO2 and PEEP, follow SpO2, CXR, blood gasses   Bronched 10/13  CPAP/TC trials as tolerated  TC since 5 AM yesterday  CPAP overnight 10/18   Ambu bag and suction as needed for thick secretions, continue mucomyst/duonebs      Mode: CPAP with PS  FiO2: 40  PEEP: 5  PS: 12  MAP: 9  PIP: 18              ***GI***  [x] Diet:  TFs, Glucerna 1.2 @ 65cc/hr  [x] Protonix       ***Renal***  Continue to monitor I/Os, BUN/Creatinine.   Replete lytes PRN  Diuresis with Lasix, increased to 40 IV BID        ***ID***  SCx on 10/4+Methicillin resistant Staphylococcus aureus, c/w IVPB Vancomycin, (plan for 6 week course in setting of valve surgery, 11/26end date)  9/27 blood culture Neela Glabarata, on flucanazole (lifelong suppression)  PO Vancomycin solution for C.diff prophylaxis   R elbow wound, S/p IR for elbow drainage 10/13 + Few Proteus mirabilis ESBL, continue with meropenum 7 day trial (10/12/2022-10/19/2022)  BCx 10/13 NGTD  Tobramycin discontinued       ***Endocrine***  [x]  DM : HbA1c 9.4%                - [x] ISS  [x] NPH             - Need tight glycemic control to prevent wound infection.          Patient requires continuous monitoring with bedside rhythm monitoring, pulse oximetry monitoring, and continuous central venous and arterial pressure monitoring; and intermittent blood gas analysis. Care plan discussed with the ICU care team.   Patient remained critical, at risk for life threatening decompensation.    I have spent 30 minutes providing critical care management to this patient.    By signing my name below, I, Kieran Plascencia, attest that this documentation has been prepared under the direction and in the presence of Edson Chris NP   Electronically signed: Georgi Cordero, 10-18-22 @ 07:25    I, Edson Chris NP, personally performed the services described in this documentation. all medical record entries made by the scribe were at my direction and in my presence. I have reviewed the chart and agree that the record reflects my personal performance and is accurate and complete  Electronically signed: Edson Chris NP

## 2022-10-18 NOTE — PROGRESS NOTE ADULT - SUBJECTIVE AND OBJECTIVE BOX
CHIEF COMPLAINT: Chest pain    Interval Events: No acute events. No chest pain or dyspnea. Reports continued neck pain. No fevers, chills, nausea, emesis, or diarrhea.    REVIEW OF SYSTEMS:  See above. ROS otherwise negative.    OBJECTIVE:  ICU Vital Signs Last 24 Hrs  T(C): 36.1 (18 Oct 2022 12:00), Max: 36.6 (17 Oct 2022 20:00)  T(F): 97 (18 Oct 2022 12:00), Max: 97.9 (17 Oct 2022 20:00)  HR: 84 (18 Oct 2022 14:00) (73 - 90)  BP: --  BP(mean): --  ABP: 139/63 (18 Oct 2022 14:00) (129/51 - 160/64)  ABP(mean): 90 (18 Oct 2022 14:00) (76 - 100)  RR: 26 (18 Oct 2022 14:00) (14 - 40)  SpO2: 100% (18 Oct 2022 14:00) (92% - 100%)    O2 Parameters below as of 18 Oct 2022 13:09  Patient On (Oxygen Delivery Method): tracheostomy collar          Mode: standby    10-17 @ 07:01  -  10-18 @ 07:00  --------------------------------------------------------  IN: 3640.5 mL / OUT: 1550 mL / NET: 2090.5 mL    10-18 @ 07:01  -  10-18 @ 14:35  --------------------------------------------------------  IN: 743.5 mL / OUT: 160 mL / NET: 583.5 mL      CAPILLARY BLOOD GLUCOSE  284 (18 Oct 2022 12:00)      POCT Blood Glucose.: 284 mg/dL (18 Oct 2022 12:03)      PHYSICAL EXAM:  General: Adult female sitting comfortably in chair, NAD  HEENT: NC/AT sclerae anicteric.   Neck: Trach in place, trach collar on  Respiratory: No increased WOB. CTAB  Cardiovascular: S1, S2  Abdomen: Soft, + BS  Extremities: WWP  Skin: Incision dressed  Neurological: Awake, alert, follows commands  Psychiatry: Appropriate affect    HOSPITAL MEDICATIONS:  MEDICATIONS  (STANDING):  acetylcysteine 10%  Inhalation 4 milliLiter(s) Inhalation every 12 hours  albuterol/ipratropium for Nebulization 3 milliLiter(s) Nebulizer every 6 hours  buDESOnide    Inhalation Suspension 0.5 milliGRAM(s) Inhalation every 12 hours  chlorhexidine 0.12% Liquid 15 milliLiter(s) Oral Mucosa every 12 hours  chlorhexidine 2% Cloths 1 Application(s) Topical <User Schedule>  collagenase Ointment 1 Application(s) Topical daily  dextrose 50% Injectable 50 milliLiter(s) IV Push every 15 minutes  fluconAZOLE IVPB 600 milliGRAM(s) IV Intermittent every 24 hours  furosemide   Injectable 40 milliGRAM(s) IV Push two times a day  heparin  Infusion 1050 Unit(s)/Hr (9.5 mL/Hr) IV Continuous <Continuous>  hydrALAZINE 37.5 milliGRAM(s) Oral every 8 hours  insulin lispro (ADMELOG) corrective regimen sliding scale   SubCutaneous every 6 hours  insulin NPH human recombinant 18 Unit(s) SubCutaneous every 6 hours  magnesium oxide 400 milliGRAM(s) Oral every 8 hours  meropenem  IVPB 1000 milliGRAM(s) IV Intermittent every 8 hours  methylPREDNISolone 8 milliGRAM(s) Oral daily  metoprolol tartrate 12.5 milliGRAM(s) Enteral Tube every 12 hours  mexiletine 200 milliGRAM(s) Oral every 8 hours  multivitamin 1 Tablet(s) Oral daily  pantoprazole  Injectable 40 milliGRAM(s) IV Push daily  sodium chloride 0.9%. 1000 milliLiter(s) (10 mL/Hr) IV Continuous <Continuous>  vancomycin  IVPB 750 milliGRAM(s) IV Intermittent every 12 hours  vancomycin  IVPB        MEDICATIONS  (PRN):  acetaminophen    Suspension .. 650 milliGRAM(s) Oral every 6 hours PRN Temp greater or equal to 38C (100.4F), Mild Pain (1 - 3)      LABS:                        9.1    12.98 )-----------( 311      ( 18 Oct 2022 00:34 )             29.8     Hgb Trend: 9.1<--, 9.7<--, 10.1<--, 9.4<--, 7.8<--  10-18    138  |  100  |  22  ----------------------------<  150<H>  4.3   |  28  |  0.40<L>    Ca    9.5      18 Oct 2022 00:34  Phos  3.1     10-18  Mg     1.8     10-18    TPro  7.1  /  Alb  3.3  /  TBili  0.3  /  DBili  x   /  AST  16  /  ALT  25  /  AlkPhos  146<H>  10-18    Creatinine Trend: 0.40<--, 0.45<--, 0.46<--, 0.41<--, 0.44<--, 0.47<--  PT/INR - ( 18 Oct 2022 00:34 )   PT: 13.3 sec;   INR: 1.14 ratio         PTT - ( 18 Oct 2022 07:19 )  PTT:71.9 sec    Arterial Blood Gas:  10-18 @ 00:00  7.43/46/148/30/98.3/5.5  ABG lactate: --  Arterial Blood Gas:  10-17 @ 00:00  7.45/44/192/31/99.9/5.9  ABG lactate: --        MICROBIOLOGY:       RADIOLOGY:  [x ] Reviewed and interpreted by me    PULMONARY FUNCTION TESTS:    EKG:

## 2022-10-18 NOTE — PROGRESS NOTE ADULT - ASSESSMENT
71 year-old female with a history of DM2, CAD, A-Fib on apixaban, Severe Persistent Asthma (on chronic steroids, recently started on Tezspire), colon cancer s/p resection/chemo, and tracheobronchomalacia s/p tracheoplasty, and recent OM of the R foot s/b debridement and completed course of Vancomycin/Ertapenem for MRSA/ESBL Proteus/Corynebacterium who now presents with chest pain, found to have Type A dissection s/p repair with modified Bentall procedure and hemiarch replacement on 9/6/22. Post-op course complicated by acute hypoxemic respiratory failure, shock, anemia, and hyperglycemia requiring insulin gtt. Extubated 9/13, now awake and alert with mild encephalopathy but overall significantly improved.    Assessment:  Acute hypoxemic respiratory failure requiring intubation  Type A Aortic Dissection  Severe Persistent Asthma  History of Tracheobronchomalacia  fevers and MSSA bacteremia and tracheal aspirate material with MSSA    -decreasing leukocytosis  Fevers and MRSA bacteremia last + culture 9/28  last positive Candida blood culture 9/30  Continue IV vancomycin- trough 15. on 10/18 is therapeutic maintain current dosing      TTE performed 10/1 no evidence of vegetations on the valves EF improving but would favor repeating another TTE next week  Leukocytosis could also be in part due to extensive DVT RUE she underwent drainage of RUE timi-elbow fluid with cultures no growth to date        MRSA  bacteremia and candidemia  Will plan to treat for 4-6 weeks in the setting of post surgical repair of thoracic aorta dissection  Continue to follow CBC  Continue to follow creatinine  Continue to follow Vanco trough levels  repeat blood cultures for evidence of fungemia and bacteremia clearance show evidence of clearing of infections  ESBL proteus in elbow fluid is sensitive to ertapenem Could change Meropenem to Ertapenem 1 gram a day      Jeff Calixto MD  Can be called via Teams  After 5pm/weekends [phone]

## 2022-10-18 NOTE — PROGRESS NOTE ADULT - ASSESSMENT
71 y/o F w/CAD, AFib, asthma, tracheobronchomalacia s/p tracheoplasty, colon cancer s/p resection and chemo admitted for Type A aortic dissection s/p repair w/hospital course c/b acute hypoxemic respiratory failure now s/p trach. MRSA PNA, candida glabarata fungemia    - Tolerating trach collar during the day, wean from vent as tolerated  - Chest PT, pulmonary toilet, bronchodilators  - Abx/antifungals as per ID  - Postoperative care as per surgical team

## 2022-10-18 NOTE — PROGRESS NOTE ADULT - SUBJECTIVE AND OBJECTIVE BOX
INFECTIOUS DISEASES FOLLOW UP-- Fouzia Marily  [phone]    This is a follow up note for this  72yFemale with  Dissection of thoracic aorta    improving  sitting  in a chair interactive and reports feeling ok- stood with assistance        ROS:  CONSTITUTIONAL:  No fever,   CARDIOVASCULAR:  No chest pain or palpitations  RESPIRATORY:  No dyspnea  GASTROINTESTINAL:  No nausea, vomiting, diarrhea, or abdominal pain  GENITOURINARY:  No dysuria  NEUROLOGIC:  No headache,     Allergies    aspirin (Short breath)  Avelox (Short breath; Pruritus)  cefepime (Anaphylaxis)  codeine (Short breath)  Dilaudid (Short breath)  iodine (Short breath; Swelling)  penicillin (Anaphylaxis)  shellfish (Anaphylaxis)  tetanus toxoid (Short breath)  Valium (Short breath)    Intolerances        ANTIBIOTICS/RELEVANT:  antimicrobials  fluconAZOLE IVPB 600 milliGRAM(s) IV Intermittent every 24 hours  meropenem  IVPB 1000 milliGRAM(s) IV Intermittent every 8 hours  vancomycin  IVPB 750 milliGRAM(s) IV Intermittent every 12 hours  vancomycin  IVPB        immunologic:    OTHER:  acetaminophen    Suspension .. 650 milliGRAM(s) Oral every 6 hours PRN  acetylcysteine 10%  Inhalation 4 milliLiter(s) Inhalation every 12 hours  albuterol/ipratropium for Nebulization 3 milliLiter(s) Nebulizer every 6 hours  buDESOnide    Inhalation Suspension 0.5 milliGRAM(s) Inhalation every 12 hours  chlorhexidine 0.12% Liquid 15 milliLiter(s) Oral Mucosa every 12 hours  chlorhexidine 2% Cloths 1 Application(s) Topical <User Schedule>  collagenase Ointment 1 Application(s) Topical daily  dextrose 50% Injectable 50 milliLiter(s) IV Push every 15 minutes  furosemide   Injectable 40 milliGRAM(s) IV Push two times a day  heparin  Infusion 1050 Unit(s)/Hr IV Continuous <Continuous>  hydrALAZINE 37.5 milliGRAM(s) Oral every 8 hours  insulin lispro (ADMELOG) corrective regimen sliding scale   SubCutaneous every 6 hours  insulin NPH human recombinant 18 Unit(s) SubCutaneous every 6 hours  magnesium oxide 400 milliGRAM(s) Oral every 8 hours  methylPREDNISolone 8 milliGRAM(s) Oral daily  metoprolol tartrate 12.5 milliGRAM(s) Enteral Tube every 12 hours  mexiletine 200 milliGRAM(s) Oral every 8 hours  multivitamin 1 Tablet(s) Oral daily  pantoprazole  Injectable 40 milliGRAM(s) IV Push daily  sodium chloride 0.9%. 1000 milliLiter(s) IV Continuous <Continuous>      Objective:  Vital Signs Last 24 Hrs  T(C): 36.2 (18 Oct 2022 16:00), Max: 36.6 (17 Oct 2022 20:00)  T(F): 97.2 (18 Oct 2022 16:00), Max: 97.9 (17 Oct 2022 20:00)  HR: 72 (18 Oct 2022 18:14) (72 - 93)  BP: --  BP(mean): --  RR: 24 (18 Oct 2022 17:00) (14 - 40)  SpO2: 100% (18 Oct 2022 18:14) (92% - 100%)    Parameters below as of 18 Oct 2022 18:14  Patient On (Oxygen Delivery Method): tracheostomy collar        PHYSICAL EXAM:  Constitutional:no acute distress  Eyes:EBER, EOMI  Ear/Nose/Throat: no oral lesions, trach collar  left CVC placed 10/13	  Respiratory: clear BL  Cardiovascular: S1S2  Gastrointestinal:soft, (+) BS, no tenderness  Extremities:no e/e/c  No Lymphadenopathy  IV sites not inflammed.    LABS:                        9.1    12.98 )-----------( 311      ( 18 Oct 2022 00:34 )             29.8     10-18    138  |  100  |  22  ----------------------------<  150<H>  4.3   |  28  |  0.40<L>    Ca    9.5      18 Oct 2022 00:34  Phos  3.1     10-18  Mg     1.8     10-18    TPro  7.1  /  Alb  3.3  /  TBili  0.3  /  DBili  x   /  AST  16  /  ALT  25  /  AlkPhos  146<H>  10-18    PT/INR - ( 18 Oct 2022 00:34 )   PT: 13.3 sec;   INR: 1.14 ratio         PTT - ( 18 Oct 2022 15:28 )  PTT:61.3 sec      MICROBIOLOGY:            RECENT CULTURES:  10-13 @ 18:43  .Abscess right elbow collection  Proteus mirabilis ESBL  Proteus mirabilis ESBL  GARRETT    Few Proteus mirabilis ESBL  --  10-13 @ 10:52  .Blood Blood  --  --  --    No Growth Final  --  10-12 @ 17:38  .Bronchial Bronchial Lavage  --  --  --    Normal Respiratory Jessica present  --  10-12 @ 17:25  .Blood Blood  --  --  --    No Growth Final  --  10-12 @ 02:40  .Blood Blood-Peripheral  --  --  --    No Growth Final  --  10-12 @ 02:19  .Blood Blood-Peripheral  --  --  --    No Growth Final  --      RADIOLOGY & ADDITIONAL STUDIES:    < from: Xray Chest 1 View AP/PA (10.18.22 @ 02:06) >  IMPRESSION:  Clear lungs.    < end of copied text >

## 2022-10-19 LAB
ALBUMIN SERPL ELPH-MCNC: 3.6 G/DL — SIGNIFICANT CHANGE UP (ref 3.3–5)
ALP SERPL-CCNC: 163 U/L — HIGH (ref 40–120)
ALT FLD-CCNC: 26 U/L — SIGNIFICANT CHANGE UP (ref 10–45)
ANION GAP SERPL CALC-SCNC: 13 MMOL/L — SIGNIFICANT CHANGE UP (ref 5–17)
APTT BLD: 47.7 SEC — HIGH (ref 27.5–35.5)
APTT BLD: 51.3 SEC — HIGH (ref 27.5–35.5)
APTT BLD: 54.1 SEC — HIGH (ref 27.5–35.5)
APTT BLD: 55 SEC — HIGH (ref 27.5–35.5)
AST SERPL-CCNC: 15 U/L — SIGNIFICANT CHANGE UP (ref 10–40)
BILIRUB SERPL-MCNC: 0.3 MG/DL — SIGNIFICANT CHANGE UP (ref 0.2–1.2)
BUN SERPL-MCNC: 21 MG/DL — SIGNIFICANT CHANGE UP (ref 7–23)
CALCIUM SERPL-MCNC: 10 MG/DL — SIGNIFICANT CHANGE UP (ref 8.4–10.5)
CHLORIDE SERPL-SCNC: 96 MMOL/L — SIGNIFICANT CHANGE UP (ref 96–108)
CO2 SERPL-SCNC: 28 MMOL/L — SIGNIFICANT CHANGE UP (ref 22–31)
CREAT SERPL-MCNC: 0.43 MG/DL — LOW (ref 0.5–1.3)
EGFR: 102 ML/MIN/1.73M2 — SIGNIFICANT CHANGE UP
GAS PNL BLDA: SIGNIFICANT CHANGE UP
GAS PNL BLDA: SIGNIFICANT CHANGE UP
GLUCOSE BLDC GLUCOMTR-MCNC: 189 MG/DL — HIGH (ref 70–99)
GLUCOSE BLDC GLUCOMTR-MCNC: 195 MG/DL — HIGH (ref 70–99)
GLUCOSE BLDC GLUCOMTR-MCNC: 277 MG/DL — HIGH (ref 70–99)
GLUCOSE SERPL-MCNC: 174 MG/DL — HIGH (ref 70–99)
HCT VFR BLD CALC: 34.1 % — LOW (ref 34.5–45)
HGB BLD-MCNC: 10.3 G/DL — LOW (ref 11.5–15.5)
INR BLD: 1.06 RATIO — SIGNIFICANT CHANGE UP (ref 0.88–1.16)
MAGNESIUM SERPL-MCNC: 2 MG/DL — SIGNIFICANT CHANGE UP (ref 1.6–2.6)
MCHC RBC-ENTMCNC: 23.7 PG — LOW (ref 27–34)
MCHC RBC-ENTMCNC: 30.2 GM/DL — LOW (ref 32–36)
MCV RBC AUTO: 78.6 FL — LOW (ref 80–100)
NRBC # BLD: 0 /100 WBCS — SIGNIFICANT CHANGE UP (ref 0–0)
PHOSPHATE SERPL-MCNC: 3.2 MG/DL — SIGNIFICANT CHANGE UP (ref 2.5–4.5)
PLATELET # BLD AUTO: 370 K/UL — SIGNIFICANT CHANGE UP (ref 150–400)
POTASSIUM SERPL-MCNC: 4.1 MMOL/L — SIGNIFICANT CHANGE UP (ref 3.5–5.3)
POTASSIUM SERPL-SCNC: 4.1 MMOL/L — SIGNIFICANT CHANGE UP (ref 3.5–5.3)
PROT SERPL-MCNC: 8.1 G/DL — SIGNIFICANT CHANGE UP (ref 6–8.3)
PROTHROM AB SERPL-ACNC: 12.2 SEC — SIGNIFICANT CHANGE UP (ref 10.5–13.4)
RBC # BLD: 4.34 M/UL — SIGNIFICANT CHANGE UP (ref 3.8–5.2)
RBC # FLD: 24.5 % — HIGH (ref 10.3–14.5)
SODIUM SERPL-SCNC: 137 MMOL/L — SIGNIFICANT CHANGE UP (ref 135–145)
VANCOMYCIN TROUGH SERPL-MCNC: 15 UG/ML — SIGNIFICANT CHANGE UP (ref 10–20)
WBC # BLD: 12.46 K/UL — HIGH (ref 3.8–10.5)
WBC # FLD AUTO: 12.46 K/UL — HIGH (ref 3.8–10.5)

## 2022-10-19 PROCEDURE — 99291 CRITICAL CARE FIRST HOUR: CPT | Mod: 24

## 2022-10-19 PROCEDURE — 71045 X-RAY EXAM CHEST 1 VIEW: CPT | Mod: 26

## 2022-10-19 PROCEDURE — 99232 SBSQ HOSP IP/OBS MODERATE 35: CPT

## 2022-10-19 RX ORDER — TIMOLOL 0.5 %
1 DROPS OPHTHALMIC (EYE)
Refills: 0 | Status: DISCONTINUED | OUTPATIENT
Start: 2022-10-19 | End: 2022-11-04

## 2022-10-19 RX ORDER — MAGNESIUM SULFATE 500 MG/ML
2 VIAL (ML) INJECTION ONCE
Refills: 0 | Status: COMPLETED | OUTPATIENT
Start: 2022-10-19 | End: 2022-10-19

## 2022-10-19 RX ORDER — HUMAN INSULIN 100 [IU]/ML
20 INJECTION, SUSPENSION SUBCUTANEOUS EVERY 6 HOURS
Refills: 0 | Status: DISCONTINUED | OUTPATIENT
Start: 2022-10-19 | End: 2022-10-22

## 2022-10-19 RX ORDER — DORZOLAMIDE HYDROCHLORIDE 20 MG/ML
1 SOLUTION/ DROPS OPHTHALMIC THREE TIMES A DAY
Refills: 0 | Status: DISCONTINUED | OUTPATIENT
Start: 2022-10-19 | End: 2022-11-04

## 2022-10-19 RX ADMIN — CHLORHEXIDINE GLUCONATE 1 APPLICATION(S): 213 SOLUTION TOPICAL at 05:01

## 2022-10-19 RX ADMIN — CHLORHEXIDINE GLUCONATE 15 MILLILITER(S): 213 SOLUTION TOPICAL at 17:51

## 2022-10-19 RX ADMIN — Medication 3 MILLILITER(S): at 06:36

## 2022-10-19 RX ADMIN — Medication 3 MILLILITER(S): at 17:22

## 2022-10-19 RX ADMIN — Medication 4 MILLILITER(S): at 06:36

## 2022-10-19 RX ADMIN — CHLORHEXIDINE GLUCONATE 1 APPLICATION(S): 213 SOLUTION TOPICAL at 20:00

## 2022-10-19 RX ADMIN — Medication 1 DROP(S): at 17:52

## 2022-10-19 RX ADMIN — CHLORHEXIDINE GLUCONATE 15 MILLILITER(S): 213 SOLUTION TOPICAL at 05:01

## 2022-10-19 RX ADMIN — MAGNESIUM OXIDE 400 MG ORAL TABLET 400 MILLIGRAM(S): 241.3 TABLET ORAL at 21:55

## 2022-10-19 RX ADMIN — Medication 25 GRAM(S): at 02:26

## 2022-10-19 RX ADMIN — DORZOLAMIDE HYDROCHLORIDE 1 DROP(S): 20 SOLUTION/ DROPS OPHTHALMIC at 17:51

## 2022-10-19 RX ADMIN — Medication 12.5 MILLIGRAM(S): at 17:51

## 2022-10-19 RX ADMIN — HEPARIN SODIUM 9.5 UNIT(S)/HR: 5000 INJECTION INTRAVENOUS; SUBCUTANEOUS at 20:20

## 2022-10-19 RX ADMIN — Medication 0.5 MILLIGRAM(S): at 17:22

## 2022-10-19 RX ADMIN — MAGNESIUM OXIDE 400 MG ORAL TABLET 400 MILLIGRAM(S): 241.3 TABLET ORAL at 05:10

## 2022-10-19 RX ADMIN — Medication 4 MILLILITER(S): at 17:23

## 2022-10-19 RX ADMIN — Medication 37.5 MILLIGRAM(S): at 14:38

## 2022-10-19 RX ADMIN — Medication 3 MILLILITER(S): at 23:26

## 2022-10-19 RX ADMIN — SODIUM CHLORIDE 10 MILLILITER(S): 9 INJECTION INTRAMUSCULAR; INTRAVENOUS; SUBCUTANEOUS at 20:20

## 2022-10-19 RX ADMIN — FLUCONAZOLE 150 MILLIGRAM(S): 150 TABLET ORAL at 22:56

## 2022-10-19 RX ADMIN — Medication 12.5 MILLIGRAM(S): at 05:10

## 2022-10-19 RX ADMIN — DORZOLAMIDE HYDROCHLORIDE 1 DROP(S): 20 SOLUTION/ DROPS OPHTHALMIC at 21:56

## 2022-10-19 RX ADMIN — MEXILETINE HYDROCHLORIDE 200 MILLIGRAM(S): 150 CAPSULE ORAL at 21:54

## 2022-10-19 RX ADMIN — Medication 1 APPLICATION(S): at 11:38

## 2022-10-19 RX ADMIN — Medication 8 MILLIGRAM(S): at 05:09

## 2022-10-19 RX ADMIN — Medication 6: at 11:41

## 2022-10-19 RX ADMIN — Medication 40 MILLIGRAM(S): at 14:38

## 2022-10-19 RX ADMIN — Medication 37.5 MILLIGRAM(S): at 05:10

## 2022-10-19 RX ADMIN — Medication 37.5 MILLIGRAM(S): at 21:54

## 2022-10-19 RX ADMIN — PANTOPRAZOLE SODIUM 40 MILLIGRAM(S): 20 TABLET, DELAYED RELEASE ORAL at 11:31

## 2022-10-19 RX ADMIN — MEXILETINE HYDROCHLORIDE 200 MILLIGRAM(S): 150 CAPSULE ORAL at 05:09

## 2022-10-19 RX ADMIN — Medication 3 MILLILITER(S): at 12:20

## 2022-10-19 RX ADMIN — Medication 40 MILLIGRAM(S): at 05:11

## 2022-10-19 RX ADMIN — Medication 2: at 17:52

## 2022-10-19 RX ADMIN — MEROPENEM 100 MILLIGRAM(S): 1 INJECTION INTRAVENOUS at 05:19

## 2022-10-19 RX ADMIN — HUMAN INSULIN 20 UNIT(S): 100 INJECTION, SUSPENSION SUBCUTANEOUS at 12:39

## 2022-10-19 RX ADMIN — Medication 3 MILLILITER(S): at 00:12

## 2022-10-19 RX ADMIN — Medication 0.5 MILLIGRAM(S): at 06:36

## 2022-10-19 RX ADMIN — Medication 1 TABLET(S): at 11:32

## 2022-10-19 RX ADMIN — MEXILETINE HYDROCHLORIDE 200 MILLIGRAM(S): 150 CAPSULE ORAL at 14:38

## 2022-10-19 RX ADMIN — Medication 2: at 05:15

## 2022-10-19 RX ADMIN — HUMAN INSULIN 20 UNIT(S): 100 INJECTION, SUSPENSION SUBCUTANEOUS at 17:53

## 2022-10-19 RX ADMIN — Medication 250 MILLIGRAM(S): at 05:12

## 2022-10-19 RX ADMIN — Medication 250 MILLIGRAM(S): at 17:51

## 2022-10-19 NOTE — PROGRESS NOTE ADULT - ASSESSMENT
71F w/h/o uncontrolled T2DM (A1C 9.4%) on basal/bolus insulin PTA. Unknown DM complications. Also COPD, secondary adrenal Insufficiency on chronic steroids, colorectal cancer s/p resection (colostomy bag), Chronic A fib on Eliquis, and tracheomalacia and multiple intubations, recent dx of OM presented to ED at [facility] with epigastric pain, belching and central chest pain. Found on CTA to have type A aortic dissection and transferred to [facility] for surgical evaluation by Dr. Cabrera. Now s/p aortic dissection repair on 9/07/22. Endocrine consulted for assistance with uncontrolled DM/steroids for AI. Pt in ICU with ventricular dysfunction/sepsis, and now s/p trach 10/10. Tolerating TFs with BG above goal ( high 100s to 200s) while on present insulin doses. Spoke to team to increase NPH dose to keep BG goal 100 [id number].   Pt remains with leukocytosis and improving transaminitis. Back on Prednisone dose 8mg

## 2022-10-19 NOTE — PROGRESS NOTE ADULT - NSPROGADDITIONALINFOA_GEN_ALL_CORE
-Plan discussed with pt/team.  Contact info: [phone] (24/7). pager [phone]  [email or web address] password [email or web address]  Teams  Spent 28 minutes assessing pt/labs/meds and discussing plan of care with primary team  Adjusting insulin  Discharge plan  Follow up care

## 2022-10-19 NOTE — PROGRESS NOTE ADULT - SUBJECTIVE AND OBJECTIVE BOX
Patient seen and examined at the bedside.    Remained critically ill on continuous ICU monitoring.    OBJECTIVE:  Vital Signs Last 24 Hrs  T(C): 36.7 (19 Oct 2022 04:00), Max: 36.8 (19 Oct 2022 00:00)  T(F): 98 (19 Oct 2022 04:00), Max: 98.2 (19 Oct 2022 00:00)  HR: 74 (19 Oct 2022 07:00) (69 - 93)  BP: --  BP(mean): --  RR: 36 (19 Oct 2022 07:00) (18 - 44)  SpO2: 97% (19 Oct 2022 07:00) (95% - 100%)    Parameters below as of 19 Oct 2022 06:42  Patient On (Oxygen Delivery Method): tracheostomy collar          Physical Exam:   General: OOBTC, NAD  Neurology: interactive, able to follow simple commands, denies pain  ENT/Neck: + trach c/d/i, neck supple, trachea midline   Respiratory: coarse BS b/l, rhonchi throughout.   CV: S1S2, no murmurs        [x]Sinus Rhythm   Abdominal: Soft, NT, ND, colostomy in place  Extremities: +4 RUE edema, 3+LUE edema, trace edema noted, + peripheral pulses   Skin: Dry dressing over right heel, R elbow wound w/ overlying cling dressing c/d/i                   Assessment:  71F PMH DM, COPD, chronic adrenal insufficiency on Prednisone, history of colorectal cancer s/p resection (colostomy bag), Hx of CAD, chronic AF on Eliquis, and tracheomalacia s/p multiple intubations, recent dx of R foot OM s/p debridement on antibiotics and presented to ED at [facility] this morning with epigastric pain, belching and central chest pain. Found on CTA to have type A aortic dissection and transferred to [facility] for surgical evaluation by Dr. Cabrera.     Ascending aortic dissection s/p type A dissection repair and Biobentall on 9/7   Respiratory failure s/p Trach 10/10/22  Hypovolemia   Post op respiratory failure   Acute blood loss anemia  Stress hyperglycemia   Leukocytosis   RIJ thrombus  MRSA PNA          Plan:   ***Neuro***  Off sedation, follows simple commands, continue to monitor  PT/OT progress as tolerated      ***Cardiovascular***  Limited TTE on 10/1: EF 69%, Hyperdynamic left ventricular systolic function. There is hypokinesis of the mid-base inferior wall. Nml RV fxn.   CT chest on 9/28: No CT evidence of pulmonary embolism. Status post repair of ascending aortic dissection with a stable dissection flap originating from the aortic arch and extending into the infrarenal abdominal aorta,  B/l UE duplex on 10/5: As before, there is an occluded RIGHT IJ vein with thrombosis extending to brachiocephalic vein. Superficial thrombophlebitis of the LEFT cephalic vein. remains on heparin drip PTT goal (50-60)  Invasive hemodynamic monitoring, assess perfusion indices   SR / CVP 3/ MAP 90/ Hct 34.1/ Lactate 1.8  [x] Hydralazine PO for BP management up-titrated to 37.5 TID  Rate control with Mexiletine and Lopressor   [x] AC Therapy with Heparin PTT 50-60 (for Afib and IJ thrombus)  Reassessment of hemodynamics  Serial EKG and cardiac enzymes     ***Pulmonary***  Respiratory failure s/p Trach #8 portex  10/10/22, per ENT inner cannula needs to be changed daily, trach sutures d/c'ed by ENT 10/17   Post op vent management   Titration of FiO2 and PEEP, follow SpO2, CXR, blood gasses   Bronched 10/13  CPAP/TC trials as tolerated  Ambu bag and suction as needed for thick secretions, continue mucomyst/duonebs      Mode: VENT OFF              ***GI***  [x] Diet:  TFs, Glucerna 1.2 @ 65cc/hr  [x] Protonix         ***Renal***  Continue to monitor I/Os, BUN/Creatinine.   Replete lytes PRN  Diuresis with Lasix, increased to 40 IV BID        ***ID***  SCx on 10/4+Methicillin resistant Staphylococcus aureus, c/w IVPB Vancomycin, (plan for 6 week course in setting of valve surgery, 11/26end date)  9/27 blood culture Neela Glabarata, on flucanazole (lifelong suppression)  PO Vancomycin solution for C.diff prophylaxis   R elbow wound, S/p IR for elbow drainage 10/13 + Few Proteus mirabilis ESBL, continue with meropenum 7 day trial (10/12/2022-10/19/2022)  BCx 10/13 NGTD  Tobramycin discontinued       ***Endocrine***  [x]  DM : HbA1c 9.4%                - [x] ISS  [x] NPH             - Need tight glycemic control to prevent wound infection.        Patient requires continuous monitoring with bedside rhythm monitoring, pulse oximetry monitoring, and continuous central venous and arterial pressure monitoring; and intermittent blood gas analysis. Care plan discussed with the ICU care team.   Patient remained critical, at risk for life threatening decompensation.    I have spent 30 minutes providing critical care management to this patient.    By signing my name below, I, Kieran Plascencia, attest that this documentation has been prepared under the direction and in the presence of Edson Chris NP   Electronically signed: Georgi Cordero, 10-19-22 @ 07:18    I, Edson Chris NP, personally performed the services described in this documentation. all medical record entries made by the scribe were at my direction and in my presence. I have reviewed the chart and agree that the record reflects my personal performance and is accurate and complete  Electronically signed: Edson Chris NP  Patient seen and examined at the bedside.    Remained critically ill on continuous ICU monitoring.    OBJECTIVE:  Vital Signs Last 24 Hrs  T(C): 36.7 (19 Oct 2022 04:00), Max: 36.8 (19 Oct 2022 00:00)  T(F): 98 (19 Oct 2022 04:00), Max: 98.2 (19 Oct 2022 00:00)  HR: 74 (19 Oct 2022 07:00) (69 - 93)  BP: --  BP(mean): --  RR: 36 (19 Oct 2022 07:00) (18 - 44)  SpO2: 97% (19 Oct 2022 07:00) (95% - 100%)    Parameters below as of 19 Oct 2022 06:42  Patient On (Oxygen Delivery Method): tracheostomy collar          Physical Exam:   General: OOBTC, NAD  Neurology: interactive, able to follow simple commands, denies pain  ENT/Neck: + trach c/d/i, neck supple, trachea midline   Respiratory: coarse BS b/l, rhonchi throughout.   CV: S1S2, no murmurs        [x]Sinus Rhythm   Abdominal: Soft, NT, ND, colostomy in place  Extremities: +4 RUE edema, 3+LUE edema, trace edema noted, + peripheral pulses   Skin: Dry dressing over right heel, R elbow wound w/ overlying cling dressing c/d/i                   Assessment:  71F PMH DM, COPD, chronic adrenal insufficiency on Prednisone, history of colorectal cancer s/p resection (colostomy bag), Hx of CAD, chronic AF on Eliquis, and tracheomalacia s/p multiple intubations, recent dx of R foot OM s/p debridement on antibiotics and presented to ED at [facility] this morning with epigastric pain, belching and central chest pain. Found on CTA to have type A aortic dissection and transferred to [facility] for surgical evaluation by Dr. Cabrera.     Ascending aortic dissection s/p type A dissection repair and Biobentall on 9/7   Respiratory failure s/p Trach 10/10/22  Hypovolemia   Post op respiratory failure   Acute blood loss anemia  Stress hyperglycemia   Leukocytosis   RIJ thrombus  MRSA PNA          Plan:   ***Neuro***  Off sedation, follows simple commands, continue to monitor  PT/OT progress as tolerated      ***Cardiovascular***  Limited TTE on 10/1: EF 69%, Hyperdynamic left ventricular systolic function. There is hypokinesis of the mid-base inferior wall. Nml RV fxn.   CT chest on 9/28: No CT evidence of pulmonary embolism. Status post repair of ascending aortic dissection with a stable dissection flap originating from the aortic arch and extending into the infrarenal abdominal aorta,  B/l UE duplex on 10/5: As before, there is an occluded RIGHT IJ vein with thrombosis extending to brachiocephalic vein. Superficial thrombophlebitis of the LEFT cephalic vein. remains on heparin drip PTT goal (50-60)  Invasive hemodynamic monitoring, assess perfusion indices   SR / CVP 3/ MAP 90/ Hct 34.1/ Lactate 1.8  [x] Hydralazine PO for BP management up-titrated to 37.5 TID  Rate control with Mexiletine and Lopressor   [x] AC Therapy with Heparin PTT 50-60 (for Afib and IJ thrombus)  Assess for need to start coumadin   Reassessment of hemodynamics  Serial EKG and cardiac enzymes     ***Pulmonary***  Respiratory failure s/p Trach #8 portex  10/10/22, per ENT inner cannula needs to be changed daily, trach sutures d/c'ed by ENT 10/17   Post op vent management   Titration of FiO2 and PEEP, follow SpO2, CXR, blood gasses   Bronched 10/13  CPAP/TC trials as tolerated  Ambu bag and suction as needed for thick secretions, continue mucomyst/duonebs      Mode: VENT OFF              ***GI***  [x] Diet:  TFs, Glucerna 1.2 @ 65cc/hr  [x] Protonix         ***Renal***  Continue to monitor I/Os, BUN/Creatinine.   Replete lytes PRN  Diuresis with Lasix, increased to 40 IV BID        ***ID***  SCx on 10/4+Methicillin resistant Staphylococcus aureus, c/w IVPB Vancomycin, (plan for 6 week course in setting of valve surgery, 11/26end date)  9/27 blood culture Neela Glabarata, on flucanazole (lifelong suppression)  PO Vancomycin solution for MRSA in the blood   R elbow wound, S/p IR for elbow drainage 10/13 + Few Proteus mirabilis ESBL, Meropenum 7 day trial (10/12/2022-10/19/2022)- finished today   BCx 10/13 NGTD  Tobramycin discontinued       ***Endocrine***  [x]  DM : HbA1c 9.4%                - [x] ISS  [x] NPH             - Need tight glycemic control to prevent wound infection.        Patient requires continuous monitoring with bedside rhythm monitoring, pulse oximetry monitoring, and continuous central venous and arterial pressure monitoring; and intermittent blood gas analysis. Care plan discussed with the ICU care team.   Patient remained critical, at risk for life threatening decompensation.    I have spent 30 minutes providing critical care management to this patient.    By signing my name below, I, Kieran Plascencia, attest that this documentation has been prepared under the direction and in the presence of Edson Chris NP   Electronically signed: Georgi Cordero, 10-19-22 @ 07:18    I, Edson Chris NP, personally performed the services described in this documentation. all medical record entries made by the scribe were at my direction and in my presence. I have reviewed the chart and agree that the record reflects my personal performance and is accurate and complete  Electronically signed: Edson Chris NP  Patient seen and examined at the bedside.    Remained critically ill on continuous ICU monitoring.    OBJECTIVE:  Vital Signs Last 24 Hrs  T(C): 36.7 (19 Oct 2022 04:00), Max: 36.8 (19 Oct 2022 00:00)  T(F): 98 (19 Oct 2022 04:00), Max: 98.2 (19 Oct 2022 00:00)  HR: 74 (19 Oct 2022 07:00) (69 - 93)  RR: 36 (19 Oct 2022 07:00) (18 - 44)  SpO2: 97% (19 Oct 2022 07:00) (95% - 100%)    Parameters below as of 19 Oct 2022 06:42  Patient On (Oxygen Delivery Method): tracheostomy collar          Physical Exam:   General: OOBTC, NAD  Neurology: interactive, able to follow simple commands, denies pain  ENT/Neck: + trach c/d/i, neck supple, trachea midline   Respiratory: coarse BS b/l, rhonchi throughout.   CV: S1S2, no murmurs        [x]Sinus Rhythm   Abdominal: Soft, NT, ND, colostomy in place  Extremities: +4 RUE edema, 3+LUE edema, trace edema noted, + peripheral pulses   Skin: Dry dressing over right heel, R elbow wound w/ overlying cling dressing c/d/i                   Assessment:  71F PMH DM, COPD, chronic adrenal insufficiency on Prednisone, history of colorectal cancer s/p resection (colostomy bag), Hx of CAD, chronic AF on Eliquis, and tracheomalacia s/p multiple intubations, recent dx of R foot OM s/p debridement on antibiotics and presented to ED at [facility] this morning with epigastric pain, belching and central chest pain. Found on CTA to have type A aortic dissection and transferred to [facility] for surgical evaluation by Dr. Cabrera.     Ascending aortic dissection s/p type A dissection repair and Biobentall on 9/7   Respiratory failure s/p Trach 10/10/22  Hypovolemia   Post op respiratory failure   Acute blood loss anemia  Stress hyperglycemia   Leukocytosis   RIJ thrombus  MRSA PNA          Plan:   ***Neuro***  Off sedation, follows simple commands, continue to monitor  PT/OT progress as tolerated      ***Cardiovascular***  Limited TTE on 10/1: EF 69%, Hyperdynamic left ventricular systolic function. There is hypokinesis of the mid-base inferior wall. Nml RV fxn.   CT chest on 9/28: No CT evidence of pulmonary embolism. Status post repair of ascending aortic dissection with a stable dissection flap originating from the aortic arch and extending into the infrarenal abdominal aorta,  B/l UE duplex on 10/5: As before, there is an occluded RIGHT IJ vein with thrombosis extending to brachiocephalic vein. Superficial thrombophlebitis of the LEFT cephalic vein. remains on heparin drip PTT goal (50-60)  Invasive hemodynamic monitoring, assess perfusion indices   SR / CVP 3/ MAP 90/ Hct 34.1/ Lactate 1.8  [x] Hydralazine PO for BP management up-titrated to 37.5 TID  Rate control with Mexiletine and Lopressor   [x] AC Therapy with Heparin PTT 50-60 (for Afib and IJ thrombus)  Assess for need to transition from Heparin to alternative anticoagulation   Reassessment of hemodynamics      ***Pulmonary***  Respiratory failure s/p Trach #8 portex  10/10/22, per ENT inner cannula needs to be changed daily, trach sutures d/c'ed by ENT 10/17   Post op vent management   Titration of FiO2 and PEEP, follow SpO2, CXR, blood gasses   Bronched 10/13  TC x over 24 hours  Ambu bag and suction as needed for thick secretions, continue mucomyst/duonebs      Mode: VENT OFF              ***GI***  [x] Diet:  TFs, Glucerna 1.2 @ 65cc/hr  [x] Protonix         ***Renal***  Continue to monitor I/Os, BUN/Creatinine.   Replete lytes PRN  Diuresis with Lasix, increased to 40 IV BID        ***ID***  SCx on 10/4+Methicillin resistant Staphylococcus aureus, c/w IVPB Vancomycin, (plan for 6 week course in setting of valve surgery, 11/26end date)  9/27 blood culture Neela Glabarata, on flucanazole (lifelong suppression)  PO Vancomycin solution for MRSA in the blood   R elbow wound, S/p IR for elbow drainage 10/13 + Few Proteus mirabilis ESBL, Meropenum 7 day trial (10/12/2022-10/19/2022)- finished today   BCx 10/13 NGTD  Tobramycin discontinued       ***Endocrine***  [x]  DM : HbA1c 9.4%                - [x] ISS  [x] NPH             - Need tight glycemic control to prevent wound infection.        Patient requires continuous monitoring with bedside rhythm monitoring, pulse oximetry monitoring, and continuous central venous and arterial pressure monitoring; and intermittent blood gas analysis. Care plan discussed with the ICU care team.   Patient remained critical, at risk for life threatening decompensation.    I have spent 30 minutes providing critical care management to this patient.    By signing my name below, I, Kieran Plascencia, attest that this documentation has been prepared under the direction and in the presence of Edson Chris NP   Electronically signed: Georgi Cordero, 10-19-22 @ 07:18    I, Edson Chris NP, personally performed the services described in this documentation. all medical record entries made by the scribe were at my direction and in my presence. I have reviewed the chart and agree that the record reflects my personal performance and is accurate and complete  Electronically signed: Edson Chris NP

## 2022-10-19 NOTE — PROGRESS NOTE ADULT - SUBJECTIVE AND OBJECTIVE BOX
DIABETES FOLLOW UP NOTE: Saw pt earlier today    Chief Complaint: Endocrine consult requested for management of T2DM    INTERVAL HX: Saw pt in CTU, tolerating TFs of Glucerna at 65 cc/hr. BG 100s to 200s while on present insulin doses. No hypoglycemia. Pt awake and able to nod yes and no to questions. Remains on antibiotics for sepsis       Review of Systems:  General: As above  Cardiovascular: No chest pain, palpitations  Respiratory: No SOB, no cough  GI: No nausea, vomiting, abdominal pain  Endocrine: No S&Sx of hypoglycemia    Allergies    aspirin (Short breath)  Avelox (Short breath; Pruritus)  cefepime (Anaphylaxis)  codeine (Short breath)  Dilaudid (Short breath)  iodine (Short breath; Swelling)  penicillin (Anaphylaxis)  shellfish (Anaphylaxis)  tetanus toxoid (Short breath)  Valium (Short breath)    Intolerances      MEDICATIONS:  fluconAZOLE IVPB 600 milliGRAM(s) IV Intermittent every 24 hours  insulin lispro (ADMELOG) corrective regimen sliding scale   SubCutaneous every 6 hours  insulin NPH human recombinant 20 Unit(s) SubCutaneous every 6 hours  methylPREDNISolone 8 milliGRAM(s) Oral daily  vancomycin  IVPB 750 milliGRAM(s) IV Intermittent every 12 hours      PHYSICAL EXAM:  VITALS: T(C): 37.1 (10-19-22 @ 12:00)  T(F): 98.7 (10-19-22 @ 12:00), Max: 98.7 (10-19-22 @ 12:00)  HR: 98 (10-19-22 @ 15:01) (69 - 98)  BP: --  RR:  (23 - 44)  SpO2:  (95% - 100%)  Wt(kg): --  GENERAL: Female sitting in chair in NAD.  HEENT: Trach in place, NGT with TFs on at 65cc/hr  Chest: Sternal incision healed  Abdomen: Soft, nontender, non distended  Extremities: Warm, no edema in all 4 exts   NEURO: Alert and able to nod to questions      LABS:  POCT Blood Glucose.: 277 mg/dL (10-19-22 @ 11:20)  POCT Blood Glucose.: 195 mg/dL (10-19-22 @ 05:07)  POCT Blood Glucose.: 157 mg/dL (10-18-22 @ 23:46)  POCT Blood Glucose.: 147 mg/dL (10-18-22 @ 17:36)  POCT Blood Glucose.: 284 mg/dL (10-18-22 @ 12:03)  POCT Blood Glucose.: 219 mg/dL (10-18-22 @ 05:12)  POCT Blood Glucose.: 148 mg/dL (10-17-22 @ 23:47)  POCT Blood Glucose.: 185 mg/dL (10-17-22 @ 17:17)  POCT Blood Glucose.: 188 mg/dL (10-17-22 @ 15:36)  POCT Blood Glucose.: 151 mg/dL (10-17-22 @ 13:41)  POCT Blood Glucose.: 92 mg/dL (10-17-22 @ 12:33)  POCT Blood Glucose.: 105 mg/dL (10-17-22 @ 11:11)  POCT Blood Glucose.: 80 mg/dL (10-17-22 @ 10:25)  POCT Blood Glucose.: 93 mg/dL (10-17-22 @ 09:10)  POCT Blood Glucose.: 113 mg/dL (10-17-22 @ 08:02)  POCT Blood Glucose.: 150 mg/dL (10-17-22 @ 06:33)  POCT Blood Glucose.: 124 mg/dL (10-17-22 @ 05:28)  POCT Blood Glucose.: 138 mg/dL (10-16-22 @ 23:54)  POCT Blood Glucose.: 117 mg/dL (10-16-22 @ 17:21)                            10.3   12.46 )-----------( 370      ( 19 Oct 2022 00:38 )             34.1       10-19    137  |  96  |  21  ----------------------------<  174<H>  4.1   |  28  |  0.43<L>    eGFR: 102    Ca    10.0      10-19  Mg     2.0     10-19  Phos  3.2     10-19    TPro  8.1  /  Alb  3.6  /  TBili  0.3  /  DBili  x   /  AST  15  /  ALT  26  /  AlkPhos  163<H>  10-19      Thyroid Function Tests:  10-03 @ 04:47 TSH 0.32 FreeT4 -- T3 -- Anti TPO -- Anti Thyroglobulin Ab -- TSI --      A1C with Estimated Average Glucose Result: 9.4 % (09-06-22 @ 16:46)      Estimated Average Glucose: 223 mg/dL (09-06-22 @ 16:46)

## 2022-10-19 NOTE — PROGRESS NOTE ADULT - PROBLEM SELECTOR PLAN 1
-test BG q6h if NPO/TF   -Increased NPH dose to 20 units q 6h while on TFs. Administer 11 untis if BG <120. HOLD IF TFS ARE OFF.  -C/w Admelog moderate correction scale q6h for now  Discharge plan:  - Likely to discharge patient home on basal/bolus insulin. Final regimen pending clinical course.  - Recommend routine outpatient ophthalmology, podiatry  - Can f/u with endocrinologist Dr. Saavedra.  - Make sure pt has Rx for all DM supplies and insulin/ DM meds.  -Based on pt's clinical condition and mental status at this time she is not able to independently manage her DM. Will evaluate pt's ability to manage DM at time of discharge. If unable> pt will need family/ care giver (s) to manage DM care at home  -Will need rehab.

## 2022-10-20 LAB
ALBUMIN SERPL ELPH-MCNC: 3.4 G/DL — SIGNIFICANT CHANGE UP (ref 3.3–5)
ALP SERPL-CCNC: 166 U/L — HIGH (ref 40–120)
ALT FLD-CCNC: 24 U/L — SIGNIFICANT CHANGE UP (ref 10–45)
ANION GAP SERPL CALC-SCNC: 12 MMOL/L — SIGNIFICANT CHANGE UP (ref 5–17)
APTT BLD: 58.1 SEC — HIGH (ref 27.5–35.5)
AST SERPL-CCNC: 25 U/L — SIGNIFICANT CHANGE UP (ref 10–40)
BILIRUB SERPL-MCNC: 0.3 MG/DL — SIGNIFICANT CHANGE UP (ref 0.2–1.2)
BUN SERPL-MCNC: 19 MG/DL — SIGNIFICANT CHANGE UP (ref 7–23)
CALCIUM SERPL-MCNC: 10 MG/DL — SIGNIFICANT CHANGE UP (ref 8.4–10.5)
CHLORIDE SERPL-SCNC: 97 MMOL/L — SIGNIFICANT CHANGE UP (ref 96–108)
CO2 SERPL-SCNC: 29 MMOL/L — SIGNIFICANT CHANGE UP (ref 22–31)
CREAT SERPL-MCNC: 0.41 MG/DL — LOW (ref 0.5–1.3)
EGFR: 103 ML/MIN/1.73M2 — SIGNIFICANT CHANGE UP
GAS PNL BLDA: SIGNIFICANT CHANGE UP
GAS PNL BLDA: SIGNIFICANT CHANGE UP
GLUCOSE BLDC GLUCOMTR-MCNC: 103 MG/DL — HIGH (ref 70–99)
GLUCOSE BLDC GLUCOMTR-MCNC: 146 MG/DL — HIGH (ref 70–99)
GLUCOSE BLDC GLUCOMTR-MCNC: 162 MG/DL — HIGH (ref 70–99)
GLUCOSE BLDC GLUCOMTR-MCNC: 198 MG/DL — HIGH (ref 70–99)
GLUCOSE SERPL-MCNC: 179 MG/DL — HIGH (ref 70–99)
HCT VFR BLD CALC: 35.2 % — SIGNIFICANT CHANGE UP (ref 34.5–45)
HGB BLD-MCNC: 10.4 G/DL — LOW (ref 11.5–15.5)
MAGNESIUM SERPL-MCNC: 1.8 MG/DL — SIGNIFICANT CHANGE UP (ref 1.6–2.6)
MCHC RBC-ENTMCNC: 23.4 PG — LOW (ref 27–34)
MCHC RBC-ENTMCNC: 29.5 GM/DL — LOW (ref 32–36)
MCV RBC AUTO: 79.3 FL — LOW (ref 80–100)
NRBC # BLD: 0 /100 WBCS — SIGNIFICANT CHANGE UP (ref 0–0)
PHOSPHATE SERPL-MCNC: 3.4 MG/DL — SIGNIFICANT CHANGE UP (ref 2.5–4.5)
PLATELET # BLD AUTO: 362 K/UL — SIGNIFICANT CHANGE UP (ref 150–400)
POTASSIUM SERPL-MCNC: 4.5 MMOL/L — SIGNIFICANT CHANGE UP (ref 3.5–5.3)
POTASSIUM SERPL-SCNC: 4.5 MMOL/L — SIGNIFICANT CHANGE UP (ref 3.5–5.3)
PROT SERPL-MCNC: 7.9 G/DL — SIGNIFICANT CHANGE UP (ref 6–8.3)
RBC # BLD: 4.44 M/UL — SIGNIFICANT CHANGE UP (ref 3.8–5.2)
RBC # FLD: 24 % — HIGH (ref 10.3–14.5)
SARS-COV-2 RNA SPEC QL NAA+PROBE: SIGNIFICANT CHANGE UP
SODIUM SERPL-SCNC: 138 MMOL/L — SIGNIFICANT CHANGE UP (ref 135–145)
VANCOMYCIN TROUGH SERPL-MCNC: 16 UG/ML — SIGNIFICANT CHANGE UP (ref 10–20)
WBC # BLD: 14.51 K/UL — HIGH (ref 3.8–10.5)
WBC # FLD AUTO: 14.51 K/UL — HIGH (ref 3.8–10.5)

## 2022-10-20 PROCEDURE — 76882 US LMTD JT/FCL EVL NVASC XTR: CPT | Mod: 26,RT

## 2022-10-20 PROCEDURE — 99232 SBSQ HOSP IP/OBS MODERATE 35: CPT

## 2022-10-20 PROCEDURE — 71045 X-RAY EXAM CHEST 1 VIEW: CPT | Mod: 26

## 2022-10-20 PROCEDURE — 99291 CRITICAL CARE FIRST HOUR: CPT | Mod: 24

## 2022-10-20 RX ORDER — POTASSIUM CHLORIDE 20 MEQ
20 PACKET (EA) ORAL ONCE
Refills: 0 | Status: COMPLETED | OUTPATIENT
Start: 2022-10-20 | End: 2022-10-20

## 2022-10-20 RX ORDER — MEROPENEM 1 G/30ML
1000 INJECTION INTRAVENOUS EVERY 8 HOURS
Refills: 0 | Status: DISCONTINUED | OUTPATIENT
Start: 2022-10-20 | End: 2022-10-24

## 2022-10-20 RX ORDER — MAGNESIUM SULFATE 500 MG/ML
2 VIAL (ML) INJECTION ONCE
Refills: 0 | Status: COMPLETED | OUTPATIENT
Start: 2022-10-20 | End: 2022-10-20

## 2022-10-20 RX ADMIN — CHLORHEXIDINE GLUCONATE 15 MILLILITER(S): 213 SOLUTION TOPICAL at 18:01

## 2022-10-20 RX ADMIN — FLUCONAZOLE 150 MILLIGRAM(S): 150 TABLET ORAL at 21:55

## 2022-10-20 RX ADMIN — Medication 25 GRAM(S): at 01:40

## 2022-10-20 RX ADMIN — Medication 3 MILLILITER(S): at 17:28

## 2022-10-20 RX ADMIN — MEXILETINE HYDROCHLORIDE 200 MILLIGRAM(S): 150 CAPSULE ORAL at 13:42

## 2022-10-20 RX ADMIN — Medication 40 MILLIGRAM(S): at 05:21

## 2022-10-20 RX ADMIN — Medication 250 MILLIGRAM(S): at 20:28

## 2022-10-20 RX ADMIN — Medication 4 MILLILITER(S): at 17:28

## 2022-10-20 RX ADMIN — Medication 2: at 00:08

## 2022-10-20 RX ADMIN — SODIUM CHLORIDE 10 MILLILITER(S): 9 INJECTION INTRAMUSCULAR; INTRAVENOUS; SUBCUTANEOUS at 20:28

## 2022-10-20 RX ADMIN — MAGNESIUM OXIDE 400 MG ORAL TABLET 400 MILLIGRAM(S): 241.3 TABLET ORAL at 22:58

## 2022-10-20 RX ADMIN — Medication 12.5 MILLIGRAM(S): at 18:02

## 2022-10-20 RX ADMIN — DORZOLAMIDE HYDROCHLORIDE 1 DROP(S): 20 SOLUTION/ DROPS OPHTHALMIC at 13:44

## 2022-10-20 RX ADMIN — HUMAN INSULIN 20 UNIT(S): 100 INJECTION, SUSPENSION SUBCUTANEOUS at 11:42

## 2022-10-20 RX ADMIN — Medication 250 MILLIGRAM(S): at 05:31

## 2022-10-20 RX ADMIN — Medication 4 MILLILITER(S): at 05:42

## 2022-10-20 RX ADMIN — Medication 40 MILLIGRAM(S): at 13:43

## 2022-10-20 RX ADMIN — Medication 37.5 MILLIGRAM(S): at 22:59

## 2022-10-20 RX ADMIN — Medication 2: at 18:05

## 2022-10-20 RX ADMIN — Medication 3 MILLILITER(S): at 05:42

## 2022-10-20 RX ADMIN — HUMAN INSULIN 20 UNIT(S): 100 INJECTION, SUSPENSION SUBCUTANEOUS at 05:37

## 2022-10-20 RX ADMIN — MEXILETINE HYDROCHLORIDE 200 MILLIGRAM(S): 150 CAPSULE ORAL at 05:23

## 2022-10-20 RX ADMIN — DORZOLAMIDE HYDROCHLORIDE 1 DROP(S): 20 SOLUTION/ DROPS OPHTHALMIC at 05:22

## 2022-10-20 RX ADMIN — MEROPENEM 100 MILLIGRAM(S): 1 INJECTION INTRAVENOUS at 21:53

## 2022-10-20 RX ADMIN — Medication 3 MILLILITER(S): at 23:09

## 2022-10-20 RX ADMIN — Medication 1 TABLET(S): at 11:39

## 2022-10-20 RX ADMIN — HEPARIN SODIUM 9.5 UNIT(S)/HR: 5000 INJECTION INTRAVENOUS; SUBCUTANEOUS at 20:27

## 2022-10-20 RX ADMIN — Medication 12.5 MILLIGRAM(S): at 05:24

## 2022-10-20 RX ADMIN — Medication 37.5 MILLIGRAM(S): at 13:45

## 2022-10-20 RX ADMIN — Medication 1 DROP(S): at 18:03

## 2022-10-20 RX ADMIN — PANTOPRAZOLE SODIUM 40 MILLIGRAM(S): 20 TABLET, DELAYED RELEASE ORAL at 11:40

## 2022-10-20 RX ADMIN — Medication 8 MILLIGRAM(S): at 05:27

## 2022-10-20 RX ADMIN — MAGNESIUM OXIDE 400 MG ORAL TABLET 400 MILLIGRAM(S): 241.3 TABLET ORAL at 05:24

## 2022-10-20 RX ADMIN — Medication 37.5 MILLIGRAM(S): at 05:25

## 2022-10-20 RX ADMIN — Medication 0.5 MILLIGRAM(S): at 17:29

## 2022-10-20 RX ADMIN — Medication 3 MILLILITER(S): at 11:12

## 2022-10-20 RX ADMIN — Medication 1 DROP(S): at 05:23

## 2022-10-20 RX ADMIN — Medication 1 APPLICATION(S): at 13:46

## 2022-10-20 RX ADMIN — HUMAN INSULIN 20 UNIT(S): 100 INJECTION, SUSPENSION SUBCUTANEOUS at 00:09

## 2022-10-20 RX ADMIN — MEXILETINE HYDROCHLORIDE 200 MILLIGRAM(S): 150 CAPSULE ORAL at 22:59

## 2022-10-20 RX ADMIN — Medication 0.5 MILLIGRAM(S): at 05:43

## 2022-10-20 RX ADMIN — MAGNESIUM OXIDE 400 MG ORAL TABLET 400 MILLIGRAM(S): 241.3 TABLET ORAL at 13:42

## 2022-10-20 RX ADMIN — CHLORHEXIDINE GLUCONATE 15 MILLILITER(S): 213 SOLUTION TOPICAL at 05:19

## 2022-10-20 RX ADMIN — HUMAN INSULIN 20 UNIT(S): 100 INJECTION, SUSPENSION SUBCUTANEOUS at 18:06

## 2022-10-20 RX ADMIN — Medication 20 MILLIEQUIVALENT(S): at 14:36

## 2022-10-20 NOTE — CONSULT NOTE ADULT - SUBJECTIVE AND OBJECTIVE BOX
Orthopedic Surgery Consult Note    HPI:   72yFemale admitted for aortic dissection. Orthopedics was initially consulted on 10/10 to rule septic arthritis in the presence of a ulcer on the patient's R elbow. There was no no orthopedic intervention at that time. Since then, the patient has developed a fluid collection in the area that has been aspirated by IR. This aspiration grew Proteus mirabilis, for which the patient was placed on a 7 day trial of meropenem. Ultrasound of the area shows that a fluid collection has re-accumulated. Orthopedics asked to re-evaluate for possible septic arthritis    Review of systems: As per HPI, otherwise negative.     PAST MEDICAL & SURGICAL HISTORY:  Atrial fibrillation  paroxysmal, on eliquis    Diabetes  Type 2    Hypertension    COPD (chronic obstructive pulmonary disease)    Adrenal insufficiency  Medrol daily for over 50 years    Aortic insufficiency  moderate AR on echo 5/3/2018    Pelvic fracture    Asthma    Hypertension    Tracheobronchomalacia  diagnosed 2015, s/p bronchial thermoplasty 2016 (Dr Zapien); recent bronchoscopy 6/5/2018 revealed no evidence of tracheobronchomalacia in trachea or bronchial tubes    Colorectal cancer  4/2018- last treatment , chemo and radiation    Aortic disease  leaky valve    Rectal bleeding    Seizure  x 1 1/7/18    DVT (deep venous thrombosis)  15-20 years ago, took coumadin    TIA (transient ischemic attack)  multiple, last 5 years ago - presents as right-sided weakness    History of partial hysterectomy  30 years ago - fibroids    H/O total knee replacement, bilateral  5 years ago    History of sinus surgery  multiple sinus surgeries    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    H/O pelvic surgery  5 years ago - s/p fracture    History of surgery  tracheo thermoplasty 2016    History of tracheomalacia  2015 - attempted tracheal stenting ([facility])- course complicated by obstruction, respiratory failure, multiple CPR attempts -  stent discontinued; 10/20/2016 Tracheobronchoplasty (Prolene Mesh) performed at [facility] by Dr Zapien    S/P bronchoscopy  6/5/2018 - [facility] (Dr Zapien) no evidence of tracheobronchomalacia in trachea or bronchial tubes    Rectal bleeding  exam under anesthesia ([facility]) 2/2018      Family History: FAMILY HISTORY:  Family history of asthma    Family history of breast cancer (Sibling)    Family history of diabetes mellitus type II        Medications: MEDICATIONS  (STANDING):  acetylcysteine 10%  Inhalation 4 milliLiter(s) Inhalation every 12 hours  albuterol/ipratropium for Nebulization 3 milliLiter(s) Nebulizer every 6 hours  buDESOnide    Inhalation Suspension 0.5 milliGRAM(s) Inhalation every 12 hours  chlorhexidine 0.12% Liquid 15 milliLiter(s) Oral Mucosa every 12 hours  chlorhexidine 2% Cloths 1 Application(s) Topical <User Schedule>  collagenase Ointment 1 Application(s) Topical daily  dextrose 50% Injectable 50 milliLiter(s) IV Push every 15 minutes  dorzolamide 2% Ophthalmic Solution 1 Drop(s) Left EYE three times a day  fluconAZOLE IVPB 600 milliGRAM(s) IV Intermittent every 24 hours  furosemide   Injectable 40 milliGRAM(s) IV Push two times a day  heparin  Infusion 1050 Unit(s)/Hr (9.5 mL/Hr) IV Continuous <Continuous>  hydrALAZINE 37.5 milliGRAM(s) Oral every 8 hours  insulin lispro (ADMELOG) corrective regimen sliding scale   SubCutaneous every 6 hours  insulin NPH human recombinant 20 Unit(s) SubCutaneous every 6 hours  magnesium oxide 400 milliGRAM(s) Oral every 8 hours  methylPREDNISolone 8 milliGRAM(s) Oral daily  metoprolol tartrate 12.5 milliGRAM(s) Enteral Tube every 12 hours  mexiletine 200 milliGRAM(s) Oral every 8 hours  multivitamin 1 Tablet(s) Oral daily  pantoprazole  Injectable 40 milliGRAM(s) IV Push daily  sodium chloride 0.9%. 1000 milliLiter(s) (10 mL/Hr) IV Continuous <Continuous>  timolol 0.5% Solution 1 Drop(s) Left EYE two times a day  vancomycin  IVPB 750 milliGRAM(s) IV Intermittent every 12 hours  vancomycin  IVPB        MEDICATIONS  (PRN):  acetaminophen    Suspension .. 650 milliGRAM(s) Oral every 6 hours PRN Temp greater or equal to 38C (100.4F), Mild Pain (1 - 3)      T(C): 36.1 (10-20-22 @ 12:00), Max: 37.1 (10-19-22 @ 20:00)  HR: 86 (10-20-22 @ 17:37) (74 - 101)  BP: 111/56 (10-19-22 @ 20:00) (111/56 - 111/56)  RR: 34 (10-20-22 @ 17:00) (18 - 34)  SpO2: 96% (10-20-22 @ 17:37) (95% - 100%)  Wt(kg): --                        10.4   14.51 )-----------( 362      ( 20 Oct 2022 00:23 )             35.2     10-20    138  |  97  |  19  ----------------------------<  179<H>  4.5   |  29  |  0.41<L>    Ca    10.0      20 Oct 2022 00:22  Phos  3.4     10-20  Mg     1.8     10-20    TPro  7.9  /  Alb  3.4  /  TBili  0.3  /  DBili  x   /  AST  25  /  ALT  24  /  AlkPhos  166<H>  10-20      EXAM:  Gen: NAD, responding to voice and able to answer questions  Resp: on ventilator via tracheostomy  RUE:   Ulcer over R elbow with eschar and granulation tissue around edges  No pain with passive ROM of elbow, ranged 0-100  No pain with micromotion  No reaction to strong palpation about the elbow  SILT diffusely  Motor in fingers grossly intact, full motor exam not obtained 2/2 deconditioning  Fingers wwp      Labs:      Imaging: ultrasound shows fluid collection in the area of the elbow wound

## 2022-10-20 NOTE — PROGRESS NOTE ADULT - ASSESSMENT
71F PMH DM, COPD, chronic adrenal insufficiency on Prednisone, history of colorectal cancer s/p resection (colostomy bag), Hx of CAD, chronic AF on Eliquis, and tracheomalacia s/p multiple intubations, recent dx of R foot OM s/p debridement on antibiotics and presented to ED at [facility] this morning with epigastric pain, belching and central chest pain. Found on CTA to have type A aortic dissection and transferred to [facility] for surgical evaluation by Dr. Cabrera.     Ascending aortic dissection s/p type A dissection repair and Biobentall on 9/7   Respiratory failure s/p Trach 10/10/22  Hypovolemia   Post op respiratory failure   Acute blood loss anemia  Stress hyperglycemia   Leukocytosis   RIJ thrombus  MRSA PNA    Wound Consult requested to assist w/ management of   Sacral/bilateral Buttocks deep tissue injury in evolution  Right elbow wound      1.) sacral/buttock injury - TRIAD paste BID and prn soiling      Right elbow wound - clean with NS, pat dry, apply allevyn foam dressing  2.) Incontinence Management - incontinence cleanser, pads, pericare BID  3.) Maintain on an alternating air with low air loss surface  4.) Turn and reposition Q 2 hours w/ assistive devices  5.) Nutrition optimization  - Moderate Acute Malnutrition -                high quality protein, mvi, vit c to assist w/ healing  6.) Offload heels/feet with complete cair air fluidized boots; ensure that the soles of the feet are not resting on the foot board of the bed.  7.) F/u Ortho and IR re Rt elbow bursitis   8.) hyperglycemia - improving w/ Glucerna, NPH, and FS w/ISS  Care as per CTU, will follow w/ you, call for new issues or deterioration.  Upon discharge f/u as outpatient at [facility] [address] [facility] [phone]  s/w RN and D/w Team and Attng  Rosemary Spencer PA-C, CWS [phone] SI spent  25 minutes face to face w/ this pt of which more than 50% of the time was spent counseling & coordinating care of this pt.

## 2022-10-20 NOTE — PROGRESS NOTE ADULT - SUBJECTIVE AND OBJECTIVE BOX
[facility]-- WOUND TEAM -- FOLLOW UP NOTE  --------------------------------------------------------------------------------    24 hour events/subjective:    alert  afebrile  pt working with PT  tolerating TF w/o vomiting  incontinent   daughter at bedside- visualized wounds     all questions answered to her satisfaction      Diet:  Diet, NPO with Tube Feed:   Tube Feeding Modality: Nasogastric  Glucerna 1.2 Chago (GLUCERNARTH)  Total Volume for 24 Hours (mL): 1560  Continuous  Starting Tube Feed Rate mL per Hour: 65  Until Goal Tube Feed Rate (mL per Hour): 65  Tube Feed Duration (in Hours): 24  Tube Feed Start Time: 08:20 (10-17-22 @ 08:23)      ROS: General/ SKIN/ MSK see HPI  all other systems negative    ALLERGIES & MEDICATIONS  --------------------------------------------------------------------------------  Allergies  aspirin (Short breath)  Avelox (Short breath; Pruritus)  cefepime (Anaphylaxis)  codeine (Short breath)  Dilaudid (Short breath)  iodine (Short breath; Swelling)  penicillin (Anaphylaxis)  shellfish (Anaphylaxis)  tetanus toxoid (Short breath)  Valium (Short breath)      STANDING INPATIENT MEDICATIONS  acetylcysteine 10%  Inhalation 4 milliLiter(s) Inhalation every 12 hours  albuterol/ipratropium for Nebulization 3 milliLiter(s) Nebulizer every 6 hours  buDESOnide Inhalation Suspension 0.5 milliGRAM(s) Inhalation every 12 hours  chlorhexidine 0.12% Liquid 15 milliLiter(s) Oral Mucosa every 12 hours  chlorhexidine 2% Cloths 1 Application(s) Topical <User Schedule>  collagenase Ointment 1 Application(s) Topical daily  dextrose 50% Injectable 50 milliLiter(s) IV Push every 15 minutes  dorzolamide 2% Ophthalmic Solution 1 Drop(s) Left EYE three times a day  fluconAZOLE IVPB 600 milliGRAM(s) IV Intermittent every 24 hours  furosemide   Injectable 40 milliGRAM(s) IV Push two times a day  heparin  Infusion 1050 Unit(s)/Hr IV Continuous <Continuous>  hydrALAZINE 37.5 milliGRAM(s) Oral every 8 hours  insulin lispro (ADMELOG) corrective regimen sliding scale   SubCutaneous every 6 hours  insulin NPH human recombinant 20 Unit(s) SubCutaneous every 6 hours  magnesium oxide 400 milliGRAM(s) Oral every 8 hours  methylPREDNISolone 8 milliGRAM(s) Oral daily  metoprolol tartrate 12.5 milliGRAM(s) Enteral Tube every 12 hours  mexiletine 200 milliGRAM(s) Oral every 8 hours  multivitamin 1 Tablet(s) Oral daily  pantoprazole  Injectable 40 milliGRAM(s) IV Push daily  sodium chloride 0.9%. 1000 milliLiter(s) IV Continuous <Continuous>  timolol 0.5% Solution 1 Drop(s) Left EYE two times a day  vancomycin  IVPB 750 milliGRAM(s) IV Intermittent every 12 hours    PRN INPATIENT MEDICATION  acetaminophen Suspension 650 milliGRAM(s) Oral every 6 hours PRN      VITALS/PHYSICAL EXAM  --------------------------------------------------------------------------------  T(C): 36.1 (10-20-22 @ 12:00), Max: 37.1 (10-19-22 @ 20:00)  HR: 86 (10-20-22 @ 17:37) (74 - 101)  BP: 111/56 (10-19-22 @ 20:00) (111/56 - 111/56)  RR: 34 (10-20-22 @ 17:00) (18 - 34)  SpO2: 96% (10-20-22 @ 17:37) (95% - 100%)  Wt(kg): --        10-19-22 @ 07:01  -  10-20-22 @ 07:00  --------------------------------------------------------  IN: 3267.5 mL / OUT: 1850 mL / NET: 1417.5 mL    10-20-22 @ 07:01  -  10-20-22 @ 17:50  --------------------------------------------------------  IN: 946 mL / OUT: 700 mL / NET: 246 mL      General: Guarded, but stable, Alert, obese  Total Care Sport  HEENT: NC/AT, clear sclera, trachea midline, mucous membranes moist  Neurology: weakened strength, sensation grossly intact, following commands  Psych: calm, appropriate  Musculoskeletal: FROM, no contractures, no deformities     Right elbow wound       7cm x 4cm x 0cm, soft eschar      (+)edema, erythema, soft fluctuance       No odor, erythema, increased warmth, tenderness, induration, nor crepitus  Vascular: BLE edema equal, RUE edema>LUE edema, RUE(+)radial / ulnar pulses  Skin: dry, good turgor  Sacral/bilateral buttocks evolving DTI    superficially denuded skin in and around the gluteal cleft L 2cm X W 3cm x D 0.1cm    pink wound bed, no necrotic tissue,     no blistering or drainage, no fluctuance, no erythema, odor, increased warmth, tenderness, induration, nor crepitus      LABS/ CULTURES/ RADIOLOGY:              10.4   14.51 >-----------<  362      [10-20-22 @ 00:23]              35.2     138  |  97  |  19  ----------------------------<  179      [10-20-22 @ 00:22]  4.5   |  29  |  0.41        Ca     10.0     [10-20-22 @ 00:22]      Mg     1.8     [10-20-22 @ 00:22]      Phos  3.4     [10-20-22 @ 00:22]    TPro  7.9  /  Alb  3.4  /  TBili  0.3  /  DBili  x   /  AST  25  /  ALT  24  /  AlkPhos  166  [10-20-22 @ 00:22]    PT/INR: PT 12.2 , INR 1.06       [10-19-22 @ 00:38]  PTT: 58.1       [10-20-22 @ 00:23]      CAPILLARY BLOOD GLUCOSE  POCT Blood Glucose.: 146 mg/dL (20 Oct 2022 11:31)  POCT Blood Glucose.: 103 mg/dL (20 Oct 2022 05:37)  POCT Blood Glucose.: 162 mg/dL (20 Oct 2022 00:02)    A1C with Estimated Average Glucose Result: 9.4 % (09-06-22 @ 16:46)  A1C with Estimated Average Glucose Result: 9.2 % (07-11-22 @ 07:36)  A1C with Estimated Average Glucose Result: 8.0 % (05-10-22 @ 12:26)

## 2022-10-20 NOTE — CHART NOTE - NSCHARTNOTEFT_GEN_A_CORE
Nutrition Follow Up Note  Patient seen for: nutrition follow up.    Chart reviewed, events noted. Pt is a 71F w/h/o uncontrolled T2DM (A1C 9.4%) on basal/bolus insulin PTA. Unknown DM complications. Also COPD, secondary adrenal Insufficiency on chronic steroids, colorectal cancer s/p resection (colostomy bag), Chronic A fib on Eliquis, and tracheomalacia and multiple intubations, recent dx of OM presented to ED at [facility] with epigastric pain, belching and central chest pain. Found on CTA to have type A aortic dissection and transferred to [facility] for surgical evaluation by Dr. Cabrera. Now POD# 16 Type A aortic dissection repair.     Source: [] Patient       [x] EMR        [x] RN        [] Family at bedside       [] Other:    -If unable to interview patient: [x] Trach/Vent/BiPAP  [x] Disoriented/confused/inappropriate to interview    Diet Order:   Diet, NPO with Tube Feed:   Tube Feeding Modality: Nasogastric  Glucerna 1.2 Chago (GLUCERNARTH)  Total Volume for 24 Hours (mL): 1560  Continuous  Starting Tube Feed Rate {mL per Hour}: 65  Until Goal Tube Feed Rate (mL per Hour): 65  Tube Feed Duration (in Hours): 24  Tube Feed Start Time: 08:20 (10-17-)    EN Order Provides: 1560mL, 1872kcal, 94g protein, 1256mL free water.   Current Pump Rate: 65mL/hr  EN provision per flow sheets:    - 10/19: 1560mL   - 10/18: 1560mL   - 10/17: 1550mL    Nutrition-related concerns:   - Pt trach to vent with trach collar trials.   - NPH + SSI for BG management. Endocrinology following. Pt continues on Medrol for adrenal insufficiency.   - IVF: NS @ 10mL/hr    GI: Colostomy output: 400mL (10/19).   Bowel Regimen? [] Yes   [x] No   - Antibiotic regimen noted    Weights:   Daily Weight in k.9 (10-20), Weight in k.1 (10-), Weight in k.5 (10-18), Weight in k.2 (10-17), Weight in k.4 (10-16), Weight in k.5 (10-15), Weight in k.9 (10-14)   - weight fluctuations noted, pt on diuretics, will continue to monitor    MEDICATIONS  (STANDING):  acetylcysteine 10%  Inhalation 4 milliLiter(s) Inhalation every 12 hours  albuterol/ipratropium for Nebulization 3 milliLiter(s) Nebulizer every 6 hours  buDESOnide    Inhalation Suspension 0.5 milliGRAM(s) Inhalation every 12 hours  chlorhexidine 0.12% Liquid 15 milliLiter(s) Oral Mucosa every 12 hours  chlorhexidine 2% Cloths 1 Application(s) Topical <User Schedule>  collagenase Ointment 1 Application(s) Topical daily  dextrose 50% Injectable 50 milliLiter(s) IV Push every 15 minutes  dorzolamide 2% Ophthalmic Solution 1 Drop(s) Left EYE three times a day  fluconAZOLE IVPB 600 milliGRAM(s) IV Intermittent every 24 hours  furosemide   Injectable 40 milliGRAM(s) IV Push two times a day  heparin  Infusion 1050 Unit(s)/Hr (9.5 mL/Hr) IV Continuous <Continuous>  hydrALAZINE 37.5 milliGRAM(s) Oral every 8 hours  insulin lispro (ADMELOG) corrective regimen sliding scale   SubCutaneous every 6 hours  insulin NPH human recombinant 20 Unit(s) SubCutaneous every 6 hours  magnesium oxide 400 milliGRAM(s) Oral every 8 hours  methylPREDNISolone 8 milliGRAM(s) Oral daily  metoprolol tartrate 12.5 milliGRAM(s) Enteral Tube every 12 hours  mexiletine 200 milliGRAM(s) Oral every 8 hours  multivitamin 1 Tablet(s) Oral daily  pantoprazole  Injectable 40 milliGRAM(s) IV Push daily  sodium chloride 0.9%. 1000 milliLiter(s) (10 mL/Hr) IV Continuous <Continuous>  timolol 0.5% Solution 1 Drop(s) Left EYE two times a day  vancomycin  IVPB 750 milliGRAM(s) IV Intermittent every 12 hours  vancomycin  IVPB        MEDICATIONS  (PRN):  acetaminophen    Suspension .. 650 milliGRAM(s) Oral every 6 hours PRN Temp greater or equal to 38C (100.4F), Mild Pain (1 - 3)    Pertinent Labs: 10-20 @ 00:22: Na 138, BUN 19, Cr 0.41<L>, <H>, K+ 4.5, Phos 3.4, Mg 1.8, Alk Phos 166<H>, ALT/SGPT 24, AST/SGOT 25, HbA1c --    A1C with Estimated Average Glucose Result: 9.4 % (22 @ 16:46)  A1C with Estimated Average Glucose Result: 9.2 % (22 @ 07:36)  A1C with Estimated Average Glucose Result: 8.0 % (05-10-22 @ 12:26)    Finger Sticks:  POCT Blood Glucose.: 103 mg/dL (10-20 @ 05:37)  POCT Blood Glucose.: 162 mg/dL (10-20 @ 00:02)  POCT Blood Glucose.: 189 mg/dL (10-19 @ 17:30)  POCT Blood Glucose.: 277 mg/dL (10-19 @ 11:20)    Skin per nursing documentation: DTI bilateral buttocks  Edema: +1 generalized, 2+ right arm    Based on UBW 62.3kg with consideration for trach to vent  Estimated Energy Needs: 1682 - 1994kcal (27 - 32kcal/kg)   Estimated Protein Needs: 87 - 112g (1.4 - 1.8g/kg)  Estimated Fluid Needs: per team.    Previous Nutrition Diagnosis: Moderate Acute Malnutrition   Nutrition Diagnosis is: [x] ongoing  [] resolved [] not applicable     New Nutrition Diagnosis: n/a    Nutrition Care Plan:  [x] In Progress - being addressed with EN as feasible  [] Achieved  [] Not applicable    Nutrition Interventions:     Education Provided:       [] Yes:  [x] No: N/A    Recommendations:  1. Continue Glucerna 1.2 at current goal rate 65ml/hr x 24 hrs. To provide: 1560ml, 1872kcal and 94g protein, 1256mL free water. Based on UBW (62.3kg): 30kcal/kg and 1.5g protein/kg. Defer additional free water flushes to team.  2. Monitor GI tolerance. RD to remain available to adjust EN formulary, volume/rate PRN.   3. Antihyperglycemic regimen deferred to team.   4. Continue multivitamin as medically feasible, recommend vitamin C to support wound healing.    Monitoring and Evaluation:   Continue to monitor nutritional intake, tolerance to diet prescription, weights, labs, skin integrity    RD remains available upon request and will follow up per protocol    Deedee Rosas MS, RD, CDN, [facility] Pager #[phone]

## 2022-10-20 NOTE — CONSULT NOTE ADULT - SUBJECTIVE AND OBJECTIVE BOX
Interventional Radiology    Evaluate for Procedure: repeat Aspiration of right elbow collection    HPI: 72y Female with concerns for septic arthritis on right elbow; US demonstrating superficial fluid collection for which patient underwent aspiration of 20cc sanguinous fluid loculated fluid.  repeat US obtained shows reaccumulation of fluid/ IR consulted for repeat aspiration.    Allergies: aspirin (Short breath)  Avelox (Short breath; Pruritus)  cefepime (Anaphylaxis)  codeine (Short breath)  Dilaudid (Short breath)  iodine (Short breath; Swelling)  penicillin (Anaphylaxis)  tetanus toxoid (Short breath)  Valium (Short breath)    Medications (Abx/Cardiac/Anticoagulation/Blood Products)  fluconAZOLE IVPB: 150 mL/Hr IV Intermittent (10-19 @ 22:56)  furosemide   Injectable: 40 milliGRAM(s) IV Push (10-20 @ 13:43)  heparin  Infusion: 9.5 mL/Hr IV Continuous (10-19 @ 07:58)  hydrALAZINE: 37.5 milliGRAM(s) Oral (10-20 @ 13:45)  meropenem  IVPB: 100 mL/Hr IV Intermittent (10-19 @ 05:19)  metoprolol tartrate: 12.5 milliGRAM(s) Enteral Tube (10-20 @ 18:02)  mexiletine: 200 milliGRAM(s) Oral (10-20 @ 13:42)  vancomycin  IVPB: 250 mL/Hr IV Intermittent (10-20 @ 05:31)    Data:  T(C): 36.1  HR: 86  BP: 111/56  RR: 34  SpO2: 96%    -WBC 14.51 / HgB 10.4 / Hct 35.2 / Plt 362  -Na 138 / Cl 97 / BUN 19 / Glucose 179  -K 4.5 / CO2 29 / Cr 0.41  -ALT 24 / Alk Phos 166 / T.Bili 0.3  -INR -- / PTT 58.1    Radiology:     Assessment/Plan: 72y Female with concerns for septic arthritis on right elbow; US demonstrating superficial fluid collection for which patient underwent aspiration of 20cc sanguinous fluid loculated fluid.  repeat US obtained shows reaccumulation of fluid/ IR consulted for repeat aspiration.    -Imaging reviewed. Superficial Small fluid component which is mostly non drainable phlegm. 0.5cm deep to skin consider I&D.   -No IR intervention indicated at this time.   -Case and imaging reviewed with Pascual Timmons   -Above d/w primary team  -Please conact IR at [phone] with any questions/ concerns regarding above.

## 2022-10-20 NOTE — CONSULT NOTE ADULT - NSCONSULTADDITIONALINFOA_GEN_ALL_CORE
Chart reviewed. Agree with above note.    Richard Sanchez MD
I have not seen the patient but discussed findings with the resident. Ophthalmology will continue to follow

## 2022-10-20 NOTE — CONSULT NOTE ADULT - ASSESSMENT
A/P: This is a 72y Female who presented with R elbow ulcer, previously consulted for r/o elbow septic arthritis with fluid collection aspiration by IR, now with re-accumulation of fluid collection and physical exam with low concern for septic arthritis based on tolerance of range of motion and palpation of elbow    - No orthopedic surgery intervention at this time  - Re-consult IR for aspiration of fluid collection  - Re-consult ID for antibiotic treatment of infection  - May consider MRI to evaluate fluid collection if patient will tolerate  - Wound care per wound care team  - Appreciate care per primary team A/P: This is a 72y Female who presented with R elbow ulcer, previously consulted for r/o elbow septic arthritis with fluid collection aspiration by IR, now with re-accumulation of fluid collection and physical exam with low concern for septic arthritis based on tolerance of range of motion and palpation of elbow    - No orthopedic surgery intervention at this time  - Re-consult IR for aspiration of fluid collection  - Re-consult ID for antibiotic treatment of infection  - Recommend consulting plastic surgery for possible soft tissue coverage if orthopedics were to perform bedside escharotomy of ulcer  - May consider MRI to evaluate fluid collection if patient will tolerate  - Wound care per wound care team  - Appreciate care per primary team

## 2022-10-20 NOTE — PROGRESS NOTE ADULT - SUBJECTIVE AND OBJECTIVE BOX
Patient seen and examined at the bedside.    Remained critically ill on continuous ICU monitoring.    OBJECTIVE:  Vital Signs Last 24 Hrs  T(C): 36.9 (20 Oct 2022 04:00), Max: 37.2 (19 Oct 2022 16:00)  T(F): 98.4 (20 Oct 2022 04:00), Max: 98.9 (19 Oct 2022 16:00)  HR: 74 (20 Oct 2022 07:00) (73 - 98)  BP: 111/56 (19 Oct 2022 20:00) (111/56 - 111/56)  BP(mean): 80 (19 Oct 2022 20:00) (80 - 80)  RR: 28 (20 Oct 2022 07:00) (18 - 32)  SpO2: 98% (20 Oct 2022 07:00) (95% - 100%)    Parameters below as of 20 Oct 2022 05:42  Patient On (Oxygen Delivery Method): tracheostomy collar          Physical Exam:   General: OOBTC, NAD  Neurology: interactive, able to follow simple commands, denies pain  ENT/Neck: + trach c/d/i, neck supple, trachea midline   Respiratory: coarse BS b/l, rhonchi throughout.   CV: S1S2, no murmurs        [x]Sinus Rhythm   Abdominal: Soft, NT, ND, colostomy in place  Extremities: +4 RUE edema, 3+LUE edema, trace edema noted, + peripheral pulses   Skin: Dry dressing over right heel, R elbow wound w/ overlying cling dressing c/d/i                             Assessment:  71F PMH DM, COPD, chronic adrenal insufficiency on Prednisone, history of colorectal cancer s/p resection (colostomy bag), Hx of CAD, chronic AF on Eliquis, and tracheomalacia s/p multiple intubations, recent dx of R foot OM s/p debridement on antibiotics and presented to ED at [facility] this morning with epigastric pain, belching and central chest pain. Found on CTA to have type A aortic dissection and transferred to [facility] for surgical evaluation by Dr. Cabrera.     Ascending aortic dissection s/p type A dissection repair and Biobentall on 9/7   Respiratory failure s/p Trach 10/10/22  Hypovolemia   Post op respiratory failure   Acute blood loss anemia  Stress hyperglycemia   Leukocytosis   RIJ thrombus  MRSA PNA            Plan:   ***Neuro***  Off sedation, follows simple commands, continue to monitor  PT/OT progress as tolerated      ***Cardiovascular***  Limited TTE on 10/1: EF 69%, Hyperdynamic left ventricular systolic function. There is hypokinesis of the mid-base inferior wall. Nml RV fxn.   CT chest on 9/28: No CT evidence of pulmonary embolism. Status post repair of ascending aortic dissection with a stable dissection flap originating from the aortic arch and extending into the infrarenal abdominal aorta,  B/l UE duplex on 10/5: As before, there is an occluded RIGHT IJ vein with thrombosis extending to brachiocephalic vein. Superficial thrombophlebitis of the LEFT cephalic vein. remains on heparin drip PTT goal (50-60)  Invasive hemodynamic monitoring, assess perfusion indices   SR / CVP 1/ MAP 75/ Hct 35.2/ Lactate 1.4  [x] Hydralazine PO for BP management up-titrated to 37.5 TID  Rate control with Mexiletine and Lopressor   [x] AC Therapy with Heparin PTT 50-60 (for Afib and IJ thrombus)  Assess for need to transition from Heparin to alternative anticoagulation   Reassessment of hemodynamics      ***Pulmonary***  Respiratory failure s/p Trach #8 portex  10/10/22, per ENT inner cannula needs to be changed daily, trach sutures d/c'ed by ENT 10/17   Post op vent management   Titration of FiO2 and PEEP, follow SpO2, CXR, blood gasses   Bronched 10/13  TC x over 24 hours  Ambu bag and suction as needed for thick secretions, continue mucomyst/duonebs      Mode: vent off  FiO2: 40              ***GI***  [x] Diet:  TFs, Glucerna 1.2 @ 65cc/hr  [x] Protonix         ***Renal***  Continue to monitor I/Os, BUN/Creatinine.   Replete lytes PRN  Diuresis with Lasix, increased to 40 IV BID        ***ID***  SCx on 10/4+Methicillin resistant Staphylococcus aureus, c/w IVPB Vancomycin, (plan for 6 week course in setting of valve surgery, 11/26end date)  9/27 blood culture Neela Glabarata, on flucanazole (lifelong suppression)  PO Vancomycin solution for MRSA in the blood   R elbow wound, S/p IR for elbow drainage 10/13 + Few Proteus mirabilis ESBL, Meropenum 7 day trial (10/12/2022-10/19/2022)- finished today   BCx 10/13 NGTD  Tobramycin discontinued       ***Endocrine***  [x]  DM : HbA1c 9.4%                - [x] ISS  [x] NPH             - Need tight glycemic control to prevent wound infection.          Patient requires continuous monitoring with bedside rhythm monitoring, pulse oximetry monitoring, and continuous central venous and arterial pressure monitoring; and intermittent blood gas analysis. Care plan discussed with the ICU care team.   Patient remained critical, at risk for life threatening decompensation.    I have spent 30 minutes providing critical care management to this patient.    By signing my name below, I, Kieran Plascencia, attest that this documentation has been prepared under the direction and in the presence of Simone Garcia NP   Electronically signed: Rogers Cordero, 10-20-22 @ 07:01    IRadha Mirabile NP, personally performed the services described in this documentation. all medical record entries made by the rogers were at my direction and in my presence. I have reviewed the chart and agree that the record reflects my personal performance and is accurate and complete  Electronically signed: Simone Garcia NP  Patient seen and examined at the bedside.    Remained critically ill on continuous ICU monitoring.    OBJECTIVE:  Vital Signs Last 24 Hrs  T(C): 36.9 (20 Oct 2022 04:00), Max: 37.2 (19 Oct 2022 16:00)  T(F): 98.4 (20 Oct 2022 04:00), Max: 98.9 (19 Oct 2022 16:00)  HR: 74 (20 Oct 2022 07:00) (73 - 98)  BP: 111/56 (19 Oct 2022 20:00) (111/56 - 111/56)  BP(mean): 80 (19 Oct 2022 20:00) (80 - 80)  RR: 28 (20 Oct 2022 07:00) (18 - 32)  SpO2: 98% (20 Oct 2022 07:00) (95% - 100%)    Parameters below as of 20 Oct 2022 05:42  Patient On (Oxygen Delivery Method): tracheostomy collar          Physical Exam:   General: OOBTC, NAD  Neurology: interactive, able to follow simple commands, denies pain  ENT/Neck: + trach c/d/i, neck supple, trachea midline   Respiratory: coarse BS b/l, rhonchi throughout.   CV: S1S2, no murmurs        [x]Sinus Rhythm   Abdominal: Soft, NT, ND, colostomy in place  Extremities: +4 RUE edema, 3+LUE edema, trace edema noted, + peripheral pulses   Skin: Dry dressing over right heel, R elbow wound w/ overlying cling dressing c/d/i                             Assessment:  71F PMH DM, COPD, chronic adrenal insufficiency on Prednisone, history of colorectal cancer s/p resection (colostomy bag), Hx of CAD, chronic AF on Eliquis, and tracheomalacia s/p multiple intubations, recent dx of R foot OM s/p debridement on antibiotics and presented to ED at [facility] this morning with epigastric pain, belching and central chest pain. Found on CTA to have type A aortic dissection and transferred to [facility] for surgical evaluation by Dr. Cabrera.     Ascending aortic dissection s/p type A dissection repair and Biobentall on 9/7   Respiratory failure s/p Trach 10/10/22  Hypovolemia   Post op respiratory failure   Acute blood loss anemia  Stress hyperglycemia   Leukocytosis   RIJ thrombus  MRSA PNA            Plan:   ***Neuro***  Off sedation, follows simple commands, continue to monitor  PT/OT progress as tolerated      ***Cardiovascular***  Limited TTE on 10/1: EF 69%, Hyperdynamic left ventricular systolic function. There is hypokinesis of the mid-base inferior wall. Nml RV fxn.   CT chest on 9/28: No CT evidence of pulmonary embolism. Status post repair of ascending aortic dissection with a stable dissection flap originating from the aortic arch and extending into the infrarenal abdominal aorta,  B/l UE duplex on 10/5: As before, there is an occluded RIGHT IJ vein with thrombosis extending to brachiocephalic vein. Superficial thrombophlebitis of the LEFT cephalic vein. remains on heparin drip PTT goal (50-60)  Invasive hemodynamic monitoring, assess perfusion indices   SR / CVP 1/ MAP 75/ Hct 35.2/ Lactate 1.4  [x] Hydralazine PO for BP management up-titrated to 37.5 TID  Rate control with Mexiletine and Lopressor   [x] AC Therapy with Heparin PTT 50-60 (for Afib and IJ thrombus)  Assess for need to transition from Heparin to alternative anticoagulation   Reassessment of hemodynamics      ***Pulmonary***  Respiratory failure s/p Trach #8 portex  10/10/22, per ENT inner cannula needs to be changed daily, trach sutures d/c'ed by ENT 10/17   Post op vent management   Titration of FiO2 and PEEP, follow SpO2, CXR, blood gasses   Bronched 10/13  Continue with Trach Collar trials   Ambu bag and suction as needed for thick secretions, continue mucomyst/duonebs      Mode: vent off  FiO2: 40              ***GI***  [x] Diet:  TFs, Glucerna 1.2 @ 65cc/hr  [x] Protonix         ***Renal***  Continue to monitor I/Os, BUN/Creatinine.   Replete lytes PRN  Diuresis with Lasix, increased to 40 IV BID        ***ID***  SCx on 10/4+Methicillin resistant Staphylococcus aureus, c/w IVPB Vancomycin, (plan for 6 week course in setting of valve surgery, 11/26end date)  9/27 blood culture Neela Glabarata, on flucanazole (lifelong suppression)  PO Vancomycin solution for MRSA in the blood   R elbow wound, S/p IR for elbow drainage 10/13 + Few Proteus mirabilis ESBL, Meropenum 7 day trial completed yesterday   BCx 10/13 NGTD  Tobramycin discontinued       ***Endocrine***  [x]  DM : HbA1c 9.4%                - [x] ISS  [x] NPH             - Need tight glycemic control to prevent wound infection.          Patient requires continuous monitoring with bedside rhythm monitoring, pulse oximetry monitoring, and continuous central venous and arterial pressure monitoring; and intermittent blood gas analysis. Care plan discussed with the ICU care team.   Patient remained critical, at risk for life threatening decompensation.    I have spent 30 minutes providing critical care management to this patient.    By signing my name below, I, Kieran Plascencia, attest that this documentation has been prepared under the direction and in the presence of Simone Garcia NP   Electronically signed: Rogers Cordero, 10-20-22 @ 07:01    IRadha Mirabile NP, personally performed the services described in this documentation. all medical record entries made by the rogers were at my direction and in my presence. I have reviewed the chart and agree that the record reflects my personal performance and is accurate and complete  Electronically signed: Simone Garcia NP  Patient seen and examined at the bedside.    Remained critically ill on continuous ICU monitoring.    OBJECTIVE:  Vital Signs Last 24 Hrs  T(C): 36.9 (20 Oct 2022 04:00), Max: 37.2 (19 Oct 2022 16:00)  T(F): 98.4 (20 Oct 2022 04:00), Max: 98.9 (19 Oct 2022 16:00)  HR: 74 (20 Oct 2022 07:00) (73 - 98)  BP: 111/56 (19 Oct 2022 20:00) (111/56 - 111/56)  BP(mean): 80 (19 Oct 2022 20:00) (80 - 80)  RR: 28 (20 Oct 2022 07:00) (18 - 32)  SpO2: 98% (20 Oct 2022 07:00) (95% - 100%)    Parameters below as of 20 Oct 2022 05:42  Patient On (Oxygen Delivery Method): tracheostomy collar          Physical Exam:   General: OOBTC, NAD  Neurology: interactive, able to follow simple commands, denies pain  ENT/Neck: + trach c/d/i, neck supple, trachea midline   Respiratory: coarse BS b/l, rhonchi throughout.   CV: S1S2, no murmurs        [x]Sinus Rhythm   Abdominal: Soft, NT, ND, colostomy in place  Extremities: +4 RUE edema, 3+LUE edema, trace edema noted, + peripheral pulses   Skin: Dry dressing over right heel, R elbow wound w/ overlying cling dressing c/d/i                             Assessment:  71F PMH DM, COPD, chronic adrenal insufficiency on Prednisone, history of colorectal cancer s/p resection (colostomy bag), Hx of CAD, chronic AF on Eliquis, and tracheomalacia s/p multiple intubations, recent dx of R foot OM s/p debridement on antibiotics and presented to ED at [facility] this morning with epigastric pain, belching and central chest pain. Found on CTA to have type A aortic dissection and transferred to [facility] for surgical evaluation by Dr. Cabrera.     Ascending aortic dissection s/p type A dissection repair and Biobentall on 9/7   Respiratory failure s/p Trach 10/10/22  Hypovolemia   Post op respiratory failure   Acute blood loss anemia  Stress hyperglycemia   Leukocytosis   RIJ thrombus  MRSA PNA            Plan:   ***Neuro***  Off sedation, follows simple commands, continue to monitor  PT/OT progress as tolerated      ***Cardiovascular***  Limited TTE on 10/1: EF 69%, Hyperdynamic left ventricular systolic function. There is hypokinesis of the mid-base inferior wall. Nml RV fxn.   CT chest on 9/28: No CT evidence of pulmonary embolism. Status post repair of ascending aortic dissection with a stable dissection flap originating from the aortic arch and extending into the infrarenal abdominal aorta,  B/l UE duplex on 10/5: As before, there is an occluded RIGHT IJ vein with thrombosis extending to brachiocephalic vein. Superficial thrombophlebitis of the LEFT cephalic vein. remains on heparin drip PTT goal (50-60)  Invasive hemodynamic monitoring, assess perfusion indices   SR / CVP 1/ MAP 75/ Hct 35.2/ Lactate 1.4  [x] Hydralazine PO for BP management up-titrated to 37.5 TID  Rate control with Mexiletine and Lopressor   [x] AC Therapy with Heparin PTT 50-60 (for Afib and IJ thrombus)  Assess for need to transition from Heparin to alternative anticoagulation   Reassessment of hemodynamics      ***Pulmonary***  Respiratory failure s/p Trach #8 portex  10/10/22, per ENT inner cannula needs to be changed daily, trach sutures d/c'ed by ENT 10/17   Post op vent management   Titration of FiO2 and PEEP, follow SpO2, CXR, blood gasses   Bronched 10/13  Continue with Trach Collar trials   Ambu bag and suction as needed for thick secretions, continue mucomyst/duonebs      Mode: vent off  FiO2: 40              ***GI***  [x] Diet:  TFs, Glucerna 1.2 @ 65cc/hr  [x] Protonix         ***Renal***  Continue to monitor I/Os, BUN/Creatinine.   Replete lytes PRN  Diuresis with Lasix, increased to 40 IV BID        ***ID***  SCx on 10/4+Methicillin resistant Staphylococcus aureus, c/w IVPB Vancomycin, (plan for 6 week course in setting of valve surgery, 11/26end date)  9/27 blood culture Neela Glabarata, on flucanazole (lifelong suppression)  PO Vancomycin solution for MRSA in the blood   R elbow wound, S/p IR for elbow drainage 10/13 + Few Proteus mirabilis ESBL, Meropenum 7 day trial completed yesterday   BCx 10/13 NGTD  F/u ID for ?PICC for long term abx      ***Endocrine***  [x]  DM : HbA1c 9.4%                - [x] ISS  [x] NPH             - Need tight glycemic control to prevent wound infection.          Patient requires continuous monitoring with bedside rhythm monitoring, pulse oximetry monitoring, and continuous central venous and arterial pressure monitoring; and intermittent blood gas analysis. Care plan discussed with the ICU care team.   Patient remained critical, at risk for life threatening decompensation.    I have spent 35 minutes providing critical care management to this patient.    By signing my name below, I, Kieran Plascencia, attest that this documentation has been prepared under the direction and in the presence of Simone Garcia NP   Electronically signed: Rogers Cordero, 10-20-22 @ 07:01    I, Simone Garcia NP, personally performed the services described in this documentation. all medical record entries made by the rogers were at my direction and in my presence. I have reviewed the chart and agree that the record reflects my personal performance and is accurate and complete  Electronically signed: Simone Garcia NP

## 2022-10-21 LAB
ALBUMIN SERPL ELPH-MCNC: 3.5 G/DL — SIGNIFICANT CHANGE UP (ref 3.3–5)
ALP SERPL-CCNC: 158 U/L — HIGH (ref 40–120)
ALT FLD-CCNC: 24 U/L — SIGNIFICANT CHANGE UP (ref 10–45)
ANION GAP SERPL CALC-SCNC: 12 MMOL/L — SIGNIFICANT CHANGE UP (ref 5–17)
APTT BLD: 61.2 SEC — HIGH (ref 27.5–35.5)
AST SERPL-CCNC: 24 U/L — SIGNIFICANT CHANGE UP (ref 10–40)
BILIRUB SERPL-MCNC: 0.4 MG/DL — SIGNIFICANT CHANGE UP (ref 0.2–1.2)
BUN SERPL-MCNC: 22 MG/DL — SIGNIFICANT CHANGE UP (ref 7–23)
CALCIUM SERPL-MCNC: 10.1 MG/DL — SIGNIFICANT CHANGE UP (ref 8.4–10.5)
CHLORIDE SERPL-SCNC: 97 MMOL/L — SIGNIFICANT CHANGE UP (ref 96–108)
CO2 SERPL-SCNC: 32 MMOL/L — HIGH (ref 22–31)
CREAT SERPL-MCNC: 0.55 MG/DL — SIGNIFICANT CHANGE UP (ref 0.5–1.3)
EGFR: 96 ML/MIN/1.73M2 — SIGNIFICANT CHANGE UP
GAS PNL BLDA: SIGNIFICANT CHANGE UP
GLUCOSE BLDC GLUCOMTR-MCNC: 246 MG/DL — HIGH (ref 70–99)
GLUCOSE BLDC GLUCOMTR-MCNC: 28 MG/DL — CRITICAL LOW (ref 70–99)
GLUCOSE BLDC GLUCOMTR-MCNC: 281 MG/DL — HIGH (ref 70–99)
GLUCOSE BLDC GLUCOMTR-MCNC: 81 MG/DL — SIGNIFICANT CHANGE UP (ref 70–99)
GLUCOSE BLDC GLUCOMTR-MCNC: 91 MG/DL — SIGNIFICANT CHANGE UP (ref 70–99)
GLUCOSE BLDC GLUCOMTR-MCNC: 93 MG/DL — SIGNIFICANT CHANGE UP (ref 70–99)
GLUCOSE SERPL-MCNC: 78 MG/DL — SIGNIFICANT CHANGE UP (ref 70–99)
GRAM STN FLD: SIGNIFICANT CHANGE UP
HCT VFR BLD CALC: 36.1 % — SIGNIFICANT CHANGE UP (ref 34.5–45)
HGB BLD-MCNC: 10.9 G/DL — LOW (ref 11.5–15.5)
MAGNESIUM SERPL-MCNC: 1.9 MG/DL — SIGNIFICANT CHANGE UP (ref 1.6–2.6)
MCHC RBC-ENTMCNC: 24 PG — LOW (ref 27–34)
MCHC RBC-ENTMCNC: 30.2 GM/DL — LOW (ref 32–36)
MCV RBC AUTO: 79.5 FL — LOW (ref 80–100)
NRBC # BLD: 0 /100 WBCS — SIGNIFICANT CHANGE UP (ref 0–0)
PHOSPHATE SERPL-MCNC: 3.4 MG/DL — SIGNIFICANT CHANGE UP (ref 2.5–4.5)
PLATELET # BLD AUTO: 384 K/UL — SIGNIFICANT CHANGE UP (ref 150–400)
POTASSIUM SERPL-MCNC: 3.7 MMOL/L — SIGNIFICANT CHANGE UP (ref 3.5–5.3)
POTASSIUM SERPL-SCNC: 3.7 MMOL/L — SIGNIFICANT CHANGE UP (ref 3.5–5.3)
PROT SERPL-MCNC: 7.7 G/DL — SIGNIFICANT CHANGE UP (ref 6–8.3)
RBC # BLD: 4.54 M/UL — SIGNIFICANT CHANGE UP (ref 3.8–5.2)
RBC # FLD: 23.9 % — HIGH (ref 10.3–14.5)
SODIUM SERPL-SCNC: 141 MMOL/L — SIGNIFICANT CHANGE UP (ref 135–145)
SPECIMEN SOURCE: SIGNIFICANT CHANGE UP
VANCOMYCIN TROUGH SERPL-MCNC: 15.1 UG/ML — SIGNIFICANT CHANGE UP (ref 10–20)
WBC # BLD: 14.28 K/UL — HIGH (ref 3.8–10.5)
WBC # FLD AUTO: 14.28 K/UL — HIGH (ref 3.8–10.5)

## 2022-10-21 PROCEDURE — 36620 INSERTION CATHETER ARTERY: CPT | Mod: 58

## 2022-10-21 PROCEDURE — 36569 INSJ PICC 5 YR+ W/O IMAGING: CPT | Mod: 58

## 2022-10-21 PROCEDURE — 99291 CRITICAL CARE FIRST HOUR: CPT | Mod: 25

## 2022-10-21 PROCEDURE — 99232 SBSQ HOSP IP/OBS MODERATE 35: CPT

## 2022-10-21 PROCEDURE — 71045 X-RAY EXAM CHEST 1 VIEW: CPT | Mod: 26,76

## 2022-10-21 RX ORDER — TOBRAMYCIN SULFATE 40 MG/ML
300 VIAL (ML) INJECTION EVERY 12 HOURS
Refills: 0 | Status: DISCONTINUED | OUTPATIENT
Start: 2022-10-21 | End: 2022-10-21

## 2022-10-21 RX ORDER — METOCLOPRAMIDE HCL 10 MG
5 TABLET ORAL EVERY 8 HOURS
Refills: 0 | Status: DISCONTINUED | OUTPATIENT
Start: 2022-10-21 | End: 2022-11-22

## 2022-10-21 RX ORDER — HEPARIN SODIUM 5000 [USP'U]/ML
500 INJECTION INTRAVENOUS; SUBCUTANEOUS
Qty: 25000 | Refills: 0 | Status: DISCONTINUED | OUTPATIENT
Start: 2022-10-21 | End: 2022-10-23

## 2022-10-21 RX ORDER — POTASSIUM CHLORIDE 20 MEQ
20 PACKET (EA) ORAL ONCE
Refills: 0 | Status: COMPLETED | OUTPATIENT
Start: 2022-10-21 | End: 2022-10-21

## 2022-10-21 RX ORDER — MAGNESIUM SULFATE 500 MG/ML
2 VIAL (ML) INJECTION ONCE
Refills: 0 | Status: COMPLETED | OUTPATIENT
Start: 2022-10-21 | End: 2022-10-21

## 2022-10-21 RX ORDER — TOBRAMYCIN SULFATE 40 MG/ML
300 VIAL (ML) INJECTION EVERY 12 HOURS
Refills: 0 | Status: DISCONTINUED | OUTPATIENT
Start: 2022-10-21 | End: 2022-10-25

## 2022-10-21 RX ORDER — ALBUMIN HUMAN 25 %
250 VIAL (ML) INTRAVENOUS
Refills: 0 | Status: COMPLETED | OUTPATIENT
Start: 2022-10-21 | End: 2022-10-21

## 2022-10-21 RX ADMIN — MAGNESIUM OXIDE 400 MG ORAL TABLET 400 MILLIGRAM(S): 241.3 TABLET ORAL at 08:26

## 2022-10-21 RX ADMIN — Medication 4 MILLILITER(S): at 17:33

## 2022-10-21 RX ADMIN — Medication 4: at 13:38

## 2022-10-21 RX ADMIN — Medication 25 GRAM(S): at 10:13

## 2022-10-21 RX ADMIN — Medication 6: at 18:16

## 2022-10-21 RX ADMIN — Medication 3 MILLILITER(S): at 11:10

## 2022-10-21 RX ADMIN — MEXILETINE HYDROCHLORIDE 200 MILLIGRAM(S): 150 CAPSULE ORAL at 13:33

## 2022-10-21 RX ADMIN — MAGNESIUM OXIDE 400 MG ORAL TABLET 400 MILLIGRAM(S): 241.3 TABLET ORAL at 13:23

## 2022-10-21 RX ADMIN — Medication 250 MILLIGRAM(S): at 18:15

## 2022-10-21 RX ADMIN — Medication 40 MILLIGRAM(S): at 07:05

## 2022-10-21 RX ADMIN — Medication 5 MILLIGRAM(S): at 21:33

## 2022-10-21 RX ADMIN — MAGNESIUM OXIDE 400 MG ORAL TABLET 400 MILLIGRAM(S): 241.3 TABLET ORAL at 21:34

## 2022-10-21 RX ADMIN — FLUCONAZOLE 150 MILLIGRAM(S): 150 TABLET ORAL at 21:33

## 2022-10-21 RX ADMIN — Medication 300 MILLIGRAM(S): at 13:52

## 2022-10-21 RX ADMIN — Medication 20 MILLIEQUIVALENT(S): at 05:00

## 2022-10-21 RX ADMIN — Medication 20 MILLIEQUIVALENT(S): at 10:13

## 2022-10-21 RX ADMIN — Medication 3 MILLILITER(S): at 05:24

## 2022-10-21 RX ADMIN — Medication 1 TABLET(S): at 13:20

## 2022-10-21 RX ADMIN — Medication 125 MILLILITER(S): at 13:40

## 2022-10-21 RX ADMIN — MEXILETINE HYDROCHLORIDE 200 MILLIGRAM(S): 150 CAPSULE ORAL at 21:34

## 2022-10-21 RX ADMIN — CHLORHEXIDINE GLUCONATE 1 APPLICATION(S): 213 SOLUTION TOPICAL at 07:00

## 2022-10-21 RX ADMIN — Medication 37.5 MILLIGRAM(S): at 21:34

## 2022-10-21 RX ADMIN — Medication 25 GRAM(S): at 02:32

## 2022-10-21 RX ADMIN — Medication 5 MILLIGRAM(S): at 07:04

## 2022-10-21 RX ADMIN — Medication 37.5 MILLIGRAM(S): at 08:26

## 2022-10-21 RX ADMIN — Medication 20 MILLIEQUIVALENT(S): at 02:31

## 2022-10-21 RX ADMIN — MEROPENEM 100 MILLIGRAM(S): 1 INJECTION INTRAVENOUS at 13:22

## 2022-10-21 RX ADMIN — CHLORHEXIDINE GLUCONATE 15 MILLILITER(S): 213 SOLUTION TOPICAL at 17:46

## 2022-10-21 RX ADMIN — HUMAN INSULIN 20 UNIT(S): 100 INJECTION, SUSPENSION SUBCUTANEOUS at 13:37

## 2022-10-21 RX ADMIN — Medication 1 APPLICATION(S): at 13:33

## 2022-10-21 RX ADMIN — Medication 250 MILLIGRAM(S): at 08:25

## 2022-10-21 RX ADMIN — Medication 3 MILLILITER(S): at 23:13

## 2022-10-21 RX ADMIN — PANTOPRAZOLE SODIUM 40 MILLIGRAM(S): 20 TABLET, DELAYED RELEASE ORAL at 13:33

## 2022-10-21 RX ADMIN — Medication 4 MILLILITER(S): at 05:24

## 2022-10-21 RX ADMIN — DORZOLAMIDE HYDROCHLORIDE 1 DROP(S): 20 SOLUTION/ DROPS OPHTHALMIC at 07:01

## 2022-10-21 RX ADMIN — Medication 5 MILLIGRAM(S): at 13:21

## 2022-10-21 RX ADMIN — Medication 1 DROP(S): at 07:02

## 2022-10-21 RX ADMIN — CHLORHEXIDINE GLUCONATE 15 MILLILITER(S): 213 SOLUTION TOPICAL at 07:06

## 2022-10-21 RX ADMIN — HUMAN INSULIN 20 UNIT(S): 100 INJECTION, SUSPENSION SUBCUTANEOUS at 18:16

## 2022-10-21 RX ADMIN — Medication 8 MILLIGRAM(S): at 08:26

## 2022-10-21 RX ADMIN — Medication 3 MILLILITER(S): at 17:33

## 2022-10-21 RX ADMIN — Medication 125 MILLILITER(S): at 14:17

## 2022-10-21 RX ADMIN — Medication 0.5 MILLIGRAM(S): at 17:37

## 2022-10-21 RX ADMIN — MEXILETINE HYDROCHLORIDE 200 MILLIGRAM(S): 150 CAPSULE ORAL at 08:26

## 2022-10-21 RX ADMIN — Medication 0.5 MILLIGRAM(S): at 05:24

## 2022-10-21 RX ADMIN — Medication 12.5 MILLIGRAM(S): at 08:27

## 2022-10-21 RX ADMIN — MEROPENEM 100 MILLIGRAM(S): 1 INJECTION INTRAVENOUS at 21:34

## 2022-10-21 RX ADMIN — Medication 1 DROP(S): at 17:44

## 2022-10-21 RX ADMIN — MEROPENEM 100 MILLIGRAM(S): 1 INJECTION INTRAVENOUS at 07:03

## 2022-10-21 RX ADMIN — DORZOLAMIDE HYDROCHLORIDE 1 DROP(S): 20 SOLUTION/ DROPS OPHTHALMIC at 13:23

## 2022-10-21 NOTE — PROGRESS NOTE ADULT - NSPROGADDITIONALINFOA_GEN_ALL_CORE
-Plan discussed with pt/team.  Contact info: [phone] (24/7). pager [phone]  [email or web address] password [email or web address]  Teams  Spent 28 minutes assessing pt/labs/meds and discussing plan of care with primary team  Adjusting insulin  Discharge plan  Follow up care

## 2022-10-21 NOTE — PROCEDURE NOTE - NSCHLORHEXIDINEBATH_GEN_A_CORE
2% wipes/To be discontinued when line removed
To be discontinued when line removed
To be discontinued when line removed
2% wipes/To be discontinued when line removed
2% wipes/To be discontinued when line removed

## 2022-10-21 NOTE — PROCEDURE NOTE - NSPERFORMEDBY_GEN_A_CORE
Myself
Myself/Attending
Myself

## 2022-10-21 NOTE — PROCEDURE NOTE - NSANATOMICLLOCATION_GEN_A_CORE
left/basilic vein
right/radial artery
left/radial artery
right/radial artery
mouth ...
right/radial artery
left/radial artery
right/radial artery
left/radial artery
right/radial artery

## 2022-10-21 NOTE — PROCEDURE NOTE - PRACTITIONER PERFORMING THE TIME OUT
RISHABH BRUNO
Dustinlas
Edson Chris NP
Edson Chris NP
sjohn
LEE Solo, PA
kehinde
Dr. Marbella Martin
Ibarlas

## 2022-10-21 NOTE — PROCEDURE NOTE - NSTIMEOUT_GEN_A_CORE
Patient's first and last name, , procedure, and correct site confirmed prior to the start of procedure.

## 2022-10-21 NOTE — PROCEDURE NOTE - NSINFORMCONSENT_GEN_A_CORE
This was an emergent procedure.
This was an emergent procedure.
Benefits, risks, and possible complications of procedure explained to patient/caregiver who verbalized understanding and gave written consent.
This was an emergent procedure.
Daughter/Benefits, risks, and possible complications of procedure explained to patient/caregiver who verbalized understanding and gave written consent.
This was an emergent procedure.
Benefits, risks, and possible complications of procedure explained to patient/caregiver who verbalized understanding and gave written consent.
This was an emergent procedure.

## 2022-10-21 NOTE — PROGRESS NOTE ADULT - SUBJECTIVE AND OBJECTIVE BOX
DIABETES FOLLOW UP NOTE: Saw pt earlier today    Chief Complaint: Endocrine consult requested for management of T2DM    INTERVAL HX: Saw pt in CTU, appears weak and tired. Per staff and team pt with positive emesis overnight > TF held and restarted this am at40cc/hr instead of 65cc/hr. Pt denies any vomit since TF were restarted. Nods yes to having BMs. No to pain/N or abdominal pain. BG levels  <100s while off TFs. NPH insulin held at 12mn and 6am today. No hypoglycemia.  Remains on antibiotics for sepsis         Review of Systems:  General: As above  Cardiovascular: No chest pain, palpitations  Respiratory: No SOB, no cough  GI: No nausea, vomiting, abdominal pain  Endocrine: No S&Sx of hypoglycemia    Allergies    aspirin (Short breath)  Avelox (Short breath; Pruritus)  cefepime (Anaphylaxis)  codeine (Short breath)  Dilaudid (Short breath)  iodine (Short breath; Swelling)  penicillin (Anaphylaxis)  shellfish (Anaphylaxis)  tetanus toxoid (Short breath)  Valium (Short breath)    Intolerances      MEDICATIONS:  fluconAZOLE IVPB 600 milliGRAM(s) IV Intermittent every 24 hours  insulin lispro (ADMELOG) corrective regimen sliding scale   SubCutaneous every 6 hours  insulin NPH human recombinant 20 Unit(s) SubCutaneous every 6 hours  meropenem  IVPB 1000 milliGRAM(s) IV Intermittent every 8 hours  methylPREDNISolone 8 milliGRAM(s) Oral daily  tobramycin for Nebulization 300 milliGRAM(s) Inhalation every 12 hours  vancomycin  IVPB 750 milliGRAM(s) IV Intermittent every 12 hours      PHYSICAL EXAM:  VITALS: T(C): 36.8 (10-21-22 @ 16:00)  T(F): 98.2 (10-21-22 @ 16:00), Max: 101.5 (10-21-22 @ 08:00)  HR: 64 (10-21-22 @ 16:00) (59 - 101)  BP: 95/45 (10-21-22 @ 15:00) (80/44 - 140/77)  RR:  (22 - 41)  SpO2:  (92% - 99%)  Wt(kg): --  GENERAL: Female sitting in chair in NAD.  HEENT: Trach in place, NGT with TFs on at 40cc/hr  Chest: Sternal incision healed  Abdomen: Soft, nontender, non distended  Extremities: Warm, no edema in all 4 exts   NEURO: Alert and able to nod to questions      LABS:  POCT Blood Glucose.: 246 mg/dL (10-21-22 @ 13:18)  POCT Blood Glucose.: 91 mg/dL (10-21-22 @ 05:24)  POCT Blood Glucose.: 28 mg/dL (10-21-22 @ 05:23) skewed> repeated  POCT Blood Glucose.: 81 mg/dL (10-21-22 @ 04:02)  POCT Blood Glucose.: 93 mg/dL (10-21-22 @ 00:01)  POCT Blood Glucose.: 198 mg/dL (10-20-22 @ 17:56)  POCT Blood Glucose.: 146 mg/dL (10-20-22 @ 11:31)  POCT Blood Glucose.: 103 mg/dL (10-20-22 @ 05:37)  POCT Blood Glucose.: 162 mg/dL (10-20-22 @ 00:02)  POCT Blood Glucose.: 189 mg/dL (10-19-22 @ 17:30)  POCT Blood Glucose.: 277 mg/dL (10-19-22 @ 11:20)  POCT Blood Glucose.: 195 mg/dL (10-19-22 @ 05:07)  POCT Blood Glucose.: 157 mg/dL (10-18-22 @ 23:46)  POCT Blood Glucose.: 147 mg/dL (10-18-22 @ 17:36)                            10.9   14.28 )-----------( 384      ( 21 Oct 2022 00:57 )             36.1       10-21    141  |  97  |  22  ----------------------------<  78  3.7   |  32<H>  |  0.55    eGFR: 96    Ca    10.1      10-21  Mg     1.9     10-21  Phos  3.4     10-21    TPro  7.7  /  Alb  3.5  /  TBili  0.4  /  DBili  x   /  AST  24  /  ALT  24  /  AlkPhos  158<H>  10-21    Thyroid Function Tests:  10-03 @ 04:47 TSH 0.32 FreeT4 -- T3 -- Anti TPO -- Anti Thyroglobulin Ab -- TSI --      A1C with Estimated Average Glucose Result: 9.4 % (09-06-22 @ 16:46)      Estimated Average Glucose: 223 mg/dL (09-06-22 @ 16:46)

## 2022-10-21 NOTE — PROCEDURE NOTE - NSSITEPREP_SKIN_A_CORE
chlorhexidine
chlorhexidine/Adherence to aseptic technique: hand hygiene prior to donning barriers (gown, gloves), don cap and mask, sterile drape over patient
chlorhexidine
chlorhexidine/Adherence to aseptic technique: hand hygiene prior to donning barriers (gown, gloves), don cap and mask, sterile drape over patient
chlorhexidine
chlorhexidine/Adherence to aseptic technique: hand hygiene prior to donning barriers (gown, gloves), don cap and mask, sterile drape over patient
chlorhexidine
chlorhexidine

## 2022-10-21 NOTE — PROGRESS NOTE ADULT - ASSESSMENT
71F w/h/o uncontrolled T2DM (A1C 9.4%) on basal/bolus insulin PTA. Unknown DM complications. Also COPD, secondary adrenal Insufficiency on chronic steroids, colorectal cancer s/p resection (colostomy bag), Chronic A fib on Eliquis, and tracheomalacia and multiple intubations, recent dx of OM presented to ED at [facility] with epigastric pain, belching and central chest pain. Found on CTA to have type A aortic dissection and transferred to [facility] for surgical evaluation by Dr. Cabrera. Now s/p aortic dissection repair on 9/07/22. Endocrine consulted for assistance with uncontrolled DM/steroids for AI. Pt in ICU with ventricular dysfunction/sepsis, and now s/p trach 10/10. Tolerating TFs until last night when pt started to have emesis. TF held as well as NPH insulin. No further emesis today so TFs restarted at lower rate. Spoke to team about need to adjust NPH insulin if TF remain at lower rate. BG goal 100 [id number]. Noted TFs back at 65cc/hr this pm  Pt remains with leukocytosis and improving transaminitis. Back on Prednisone dose 8mg

## 2022-10-21 NOTE — PROCEDURE NOTE - NSPOSTCAREGUIDE_GEN_A_CORE
Care for catheter as per unit/ICU protocols
Care for catheter as per unit/ICU protocols
Verbal/written post procedure instructions were given to patient/caregiver/Care for catheter as per unit/ICU protocols
Care for catheter as per unit/ICU protocols
Verbal/written post procedure instructions were given to patient/caregiver/Care for catheter as per unit/ICU protocols
Care for catheter as per unit/ICU protocols

## 2022-10-21 NOTE — PROCEDURE NOTE - NSANESTHESIA_GEN_A_CORE
1% lidocaine
2% lidocaine
1% lidocaine

## 2022-10-21 NOTE — PROGRESS NOTE ADULT - SUBJECTIVE AND OBJECTIVE BOX
Patient seen and examined at the bedside.    Remained critically ill on continuous ICU monitoring.    OBJECTIVE:  Vital Signs Last 24 Hrs  T(C): 37.3 (21 Oct 2022 00:00), Max: 37.4 (20 Oct 2022 20:00)  T(F): 99.1 (21 Oct 2022 00:00), Max: 99.3 (20 Oct 2022 20:00)  HR: 98 (21 Oct 2022 03:05) (75 - 101)  BP: --  BP(mean): --  RR: 30 (21 Oct 2022 03:04) (25 - 41)  SpO2: 96% (21 Oct 2022 03:05) (94% - 99%)    Parameters below as of 21 Oct 2022 03:04  Patient On (Oxygen Delivery Method): tracheostomy collar    O2 Concentration (%): 40    Physical Exam:   General: OOBTC, NAD  Neurology: interactive, able to follow simple commands, denies pain  ENT/Neck: + trach c/d/i, neck supple, trachea midline   Respiratory: coarse BS b/l, rhonchi throughout.   CV: S1S2, no murmurs        [x]Sinus Rhythm   Abdominal: Soft, NT, ND, colostomy in place  Extremities: +4 RUE edema, 3+LUE edema, trace edema noted, + peripheral pulses   Skin: Dry dressing over right heel, R elbow wound w/ overlying cling dressing c/d/i                         Assessment:  71F PMH DM, COPD, chronic adrenal insufficiency on Prednisone, history of colorectal cancer s/p resection (colostomy bag), Hx of CAD, chronic AF on Eliquis, and tracheomalacia s/p multiple intubations, recent dx of R foot OM s/p debridement on antibiotics and presented to ED at [facility] this morning with epigastric pain, belching and central chest pain. Found on CTA to have type A aortic dissection and transferred to [facility] for surgical evaluation by Dr. Cabrera.     Ascending aortic dissection s/p type A dissection repair and Biobentall on 9/7   Respiratory failure s/p Trach 10/10/22  Hypovolemia   Post op respiratory failure   Acute blood loss anemia  Stress hyperglycemia   Leukocytosis   RIJ thrombus  MRSA PNA    Plan:   ***Neuro***  Off sedation, follows simple commands, continue to monitor  PT/OT progress as tolerated    ***Cardiovascular***  Limited TTE on 10/1: EF 69%, Hyperdynamic left ventricular systolic function. There is hypokinesis of the mid-base inferior wall. Nml RV fxn.   CT chest on 9/28: No CT evidence of pulmonary embolism. Status post repair of ascending aortic dissection with a stable dissection flap originating from the aortic arch and extending into the infrarenal abdominal aorta,  B/l UE duplex on 10/5: As before, there is an occluded RIGHT IJ vein with thrombosis extending to brachiocephalic vein. Superficial thrombophlebitis of the LEFT cephalic vein. remains on heparin drip PTT goal (50-60)    Invasive hemodynamic monitoring, assess perfusion indices   SR / MAP 97/ Hct 36.1%/ Lactate 2.1  [x] Hydralazine PO for BP management up-titrated to 37.5 TID  Rate control with Mexiletine and Lopressor   [x] AC Therapy with Heparin PTT 50-60 (for Afib and IJ thrombus)  Assess for need to transition from Heparin to alternative anticoagulation   Reassessment of hemodynamics    ***Pulmonary***  Respiratory failure s/p Trach #8 portex  10/10/22, per ENT inner cannula needs to be changed daily, trach sutures d/c'ed by ENT 10/17   Post op vent management   Titration of FiO2 and PEEP, follow SpO2, CXR, blood gasses   Bronched 10/13  Continue with Trach Collar trials   Ambu bag and suction as needed for thick secretions, continue mucomyst/duonebs    Mode: vent off            ***GI***  [x] Diet:  TFs, Glucerna 1.2 @ 65cc/hr  [x] Protonix    Reglan for gut motility      ***Renal***  Continue to monitor I/Os, BUN/Creatinine.   Replete lytes PRN  Diuresis with Lasix 40 IV BID      ***ID***  SCx on 10/4+Methicillin resistant Staphylococcus aureus, c/w IVPB Vancomycin, (plan for 6 week course in setting of valve surgery, 11/26end date)  9/27 blood culture Neela Glabarata, on flucanazole (lifelong suppression)  R elbow wound, S/p IR for elbow drainage 10/13 + Few Proteus mirabilis ESBL, Meropenum 7 day trial completed yesterday   BCx 10/13 NGTD  F/u ID for ?PICC for long term abx    ***Endocrine***  [x]  DM : HbA1c 9.4%                - [x] ISS  [x] NPH             - Need tight glycemic control to prevent wound infection.      Patient requires continuous monitoring with bedside rhythm monitoring, pulse oximetry monitoring, and continuous central venous and arterial pressure monitoring; and intermittent blood gas analysis. Care plan discussed with the ICU care team.   Patient remained critical, at risk for life threatening decompensation.    I have spent 30 minutes providing critical care management to this patient.    By signing my name below, I, Bharti Barrios, attest that this documentation has been prepared under the direction and in the presence of Radha Nichols NP.  Electronically signed: Georgi Gottlieb, 10-21-22 @ 07:52    I, Radha Nichols, personally performed the services described in this documentation. all medical record entries made by the scribe were at my direction and in my presence. I have reviewed the chart and agree that the record reflects my personal performance and is accurate and complete  Electronically signed: Radha Nichols NP. Patient seen and examined at the bedside.    Remained critically ill on continuous ICU monitoring.    OBJECTIVE:  Vital Signs Last 24 Hrs  T(C): 37.3 (21 Oct 2022 00:00), Max: 37.4 (20 Oct 2022 20:00)  T(F): 99.1 (21 Oct 2022 00:00), Max: 99.3 (20 Oct 2022 20:00)  HR: 98 (21 Oct 2022 03:05) (75 - 101)  BP: --  BP(mean): --  RR: 30 (21 Oct 2022 03:04) (25 - 41)  SpO2: 96% (21 Oct 2022 03:05) (94% - 99%)    Parameters below as of 21 Oct 2022 03:04  Patient On (Oxygen Delivery Method): tracheostomy collar    O2 Concentration (%): 40    Physical Exam:   General: OOBTC, NAD  Neurology: interactive, able to follow simple commands, denies pain  ENT/Neck: + trach c/d/i, neck supple, trachea midline   Respiratory: coarse BS b/l, rhonchi throughout.   CV: S1S2, no murmurs        [x]Sinus Rhythm   Abdominal: Soft, NT, ND, colostomy in place  Extremities: +4 RUE edema, 3+LUE edema, trace edema noted, + peripheral pulses   Skin: Dry dressing over right heel, R elbow wound w/ overlying cling dressing c/d/i                         Assessment:  71F PMH DM, COPD, chronic adrenal insufficiency on Prednisone, history of colorectal cancer s/p resection (colostomy bag), Hx of CAD, chronic AF on Eliquis, and tracheomalacia s/p multiple intubations, recent dx of R foot OM s/p debridement on antibiotics and presented to ED at [facility] this morning with epigastric pain, belching and central chest pain. Found on CTA to have type A aortic dissection and transferred to [facility] for surgical evaluation by Dr. Cabrera.     Ascending aortic dissection s/p type A dissection repair and Biobentall on 9/7   Respiratory failure s/p Trach 10/10/22  Hypovolemia   Post op respiratory failure   Acute blood loss anemia  Stress hyperglycemia   Leukocytosis   RIJ thrombus  MRSA PNA    Plan:   ***Neuro***  Off sedation, follows simple commands, continue to monitor  PT/OT progress as tolerated, standing exercises yesterday    ***Cardiovascular***  Limited TTE on 10/1: EF 69%, Hyperdynamic left ventricular systolic function. There is hypokinesis of the mid-base inferior wall. Nml RV fxn.   CT chest on 9/28: No CT evidence of pulmonary embolism. Status post repair of ascending aortic dissection with a stable dissection flap originating from the aortic arch and extending into the infrarenal abdominal aorta,  B/l UE duplex on 10/5: As before, there is an occluded RIGHT IJ vein with thrombosis extending to brachiocephalic vein. Superficial thrombophlebitis of the LEFT cephalic vein.   remains on heparin drip PTT goal (50-60)-> Heparin on hold for ? IR drainage or debridement of R elbow wound  Invasive hemodynamic monitoring, assess perfusion indices   SR / MAP 97/ Hct 36.1%/ Lactate 2.1  [x] Hydralazine PO for BP management up-titrated to 37.5 TID  Rate control with Mexiletine and Lopressor   [x] AC Therapy with Heparin PTT 50-60 (for Afib and IJ thrombus)  Assess for need to transition from Heparin to alternative anticoagulation   Reassessment of hemodynamics    ***Pulmonary***  Respiratory failure s/p Trach #8 portex  10/10/22, per ENT inner cannula needs to be changed daily, trach sutures d/c'ed by ENT 10/17   Post op vent management   Titration of FiO2 and PEEP, follow SpO2, CXR, blood gasses   Bronched 10/13  Continue with Trach Collar trials   Ambu bag and suction as needed for thick secretions, continue mucomyst/duonebs  Increase secretions this am, inhaled tobra added, bag and suction prn  Mode: vent off            ***GI***  [x] Diet:  TFs restarted after being held overnight for vomitting x1, Glucerna 1.2 @ 40cc/hr will advance to 65ml/hr as tolerated  [x] Protonix    Reglan for gut motility      ***Renal***  Continue to monitor I/Os, BUN/Creatinine.   Replete lytes PRN  Diuresis with Lasix 40 IV BID      ***ID***  SCx on 10/4+Methicillin resistant Staphylococcus aureus, c/w IVPB Vancomycin, (plan for 6 week course in setting of valve surgery, 11/26end date)  9/27 blood culture Neela Glabarata, on flucanazole (lifelong suppression)  R elbow wound, S/p IR for elbow drainage 10/13 + Few Proteus mirabilis ESBL, Meropenum 7 day trial completed-> reordered 10/20 for reacumulation of elbow brusitis, ortho reconsulted and plastic surgery for ? drainage vs debridement  BCx 10/13 NGTD, BC 10/21 and SC pending  F/u ID for ?PICC for long term abx  ID to follow up today    ***Endocrine***  [x]  DM : HbA1c 9.4%                - [x] ISS  [x] NPH             - Need tight glycemic control to prevent wound infection.      Patient requires continuous monitoring with bedside rhythm monitoring, pulse oximetry monitoring, and continuous central venous and arterial pressure monitoring; and intermittent blood gas analysis. Care plan discussed with the ICU care team.   Patient remained critical, at risk for life threatening decompensation.    I have spent 40 minutes providing critical care management to this patient.    By signing my name below, I, Bharti Barrios, attest that this documentation has been prepared under the direction and in the presence of Radha Nichols NP.  Electronically signed: Georgi Gottlieb, 10-21-22 @ 07:52    I, Radha Nichols, personally performed the services described in this documentation. all medical record entries made by the marcyibronaldo were at my direction and in my presence. I have reviewed the chart and agree that the record reflects my personal performance and is accurate and complete  Electronically signed: Radha Nichols NP.

## 2022-10-21 NOTE — PROGRESS NOTE ADULT - PROBLEM SELECTOR PLAN 1
-test BG q6h if NPO/TF   -C/w NPH dose 20 units q 6h while on TFs. Administer 11 untis if BG <120 while on TFs. HOLD IF TFS ARE OFF.  -C/w Admelog moderate correction scale q6h for now  Discharge plan:  - Likely to discharge patient home on basal/bolus insulin. Final regimen pending clinical course.  - Recommend routine outpatient ophthalmology, podiatry  - Can f/u with endocrinologist Dr. Saavedra.  - Make sure pt has Rx for all DM supplies and insulin/ DM meds.  -Based on pt's clinical condition and mental status at this time she is not able to independently manage her DM. Will evaluate pt's ability to manage DM at time of discharge. If unable> pt will need family/ care giver (s) to manage DM care at home  -Will need rehab.

## 2022-10-21 NOTE — PROCEDURE NOTE - NSINDICATIONS_GEN_A_CORE
arterial puncture to obtain ABG's/blood sampling
arterial puncture to obtain ABG's/blood sampling/critical patient/monitoring purposes
venous access
arterial puncture to obtain ABG's
critical illness/emergency venous access/venous access/volume resuscitation
blood sampling/cannulation purposes/critical patient/monitoring purposes
critical patient/monitoring purposes
arterial puncture to obtain ABG's/blood sampling
critical illness
emergency venous access/hemodynamic monitoring/venous access/volume resuscitation
arterial puncture to obtain ABG's/blood sampling
arterial puncture to obtain ABG's/critical patient/monitoring purposes

## 2022-10-21 NOTE — PROCEDURE NOTE - NSSPECDEVICE_VASC_A_CORE
monitoring device

## 2022-10-21 NOTE — PROCEDURE NOTE - PROCEDURE DATE TIME, MLM
29-Sep-2022
01-Oct-2022 18:57
06-Sep-2022 16:45
08-Sep-2022 17:30
08-Oct-2022
21-Oct-2022
27-Sep-2022 07:30
12-Oct-2022 16:00
13-Oct-2022 17:17
27-Sep-2022 09:45
13-Oct-2022
08-Oct-2022 01:00
01-Oct-2022
02-Oct-2022 12:00
01-Oct-2022
06-Sep-2022 16:20
21-Oct-2022 01:00

## 2022-10-21 NOTE — PROGRESS NOTE ADULT - ASSESSMENT
71 year-old female with a history of DM2, CAD, A-Fib on apixaban, Severe Persistent Asthma (on chronic steroids, recently started on Tezspire), colon cancer s/p resection/chemo, and tracheobronchomalacia s/p tracheoplasty, and recent OM of the R foot s/b debridement and completed course of Vancomycin/Ertapenem for MRSA/ESBL Proteus/Corynebacterium who now presents with chest pain, found to have Type A dissection s/p repair with modified Bentall procedure and hemiarch replacement on 9/6/22. Post-op course complicated by acute hypoxemic respiratory failure, shock, anemia, and hyperglycemia requiring insulin gtt. Extubated 9/13, now awake and alert with mild encephalopathy but overall significantly improved.    Assessment:  Acute hypoxemic respiratory failure requiring intubation  Type A Aortic Dissection  Severe Persistent Asthma  History of Tracheobronchomalacia  fevers and MSSA bacteremia and tracheal aspirate material with MSSA    -decreasing leukocytosis  Fevers and MRSA bacteremia last + culture 9/28  last positive Candida blood culture 9/30  Continue IV vancomycin- trough 15. on 10/18 is therapeutic maintain current dosing      TTE performed 10/1 no evidence of vegetations on the valves EF improving but would favor repeating another TTE next week  Leukocytosis could also be in part due to extensive DVT RUE she underwent drainage of RUE timi-elbow fluid with cultures no growth to date        MRSA  bacteremia and candidemia  Will plan to treat for 4-6 weeks in the setting of post surgical repair of thoracic aorta dissection  Continue to follow CBC  Continue to follow creatinine  Continue to follow Vanco trough levels  repeat blood cultures for evidence of fungemia and bacteremia clearance show evidence of clearing of infections  ESBL proteus in elbow fluid is sensitive to ertapenem       Jeff Calixto MD  Can be called via Teams  After 5pm/weekends [phone]       71 year-old female with a history of DM2, CAD, A-Fib on apixaban, Severe Persistent Asthma (on chronic steroids, recently started on Tezspire), colon cancer s/p resection/chemo, and tracheobronchomalacia s/p tracheoplasty, and recent OM of the R foot s/b debridement and completed course of Vancomycin/Ertapenem for MRSA/ESBL Proteus/Corynebacterium who now presents with chest pain, found to have Type A dissection s/p repair with modified Bentall procedure and hemiarch replacement on 9/6/22. Post-op course complicated by acute hypoxemic respiratory failure, shock, anemia, and hyperglycemia requiring insulin gtt. Extubated 9/13, now awake and alert with mild encephalopathy but overall significantly improved.    Assessment:  Acute hypoxemic respiratory failure requiring intubation  Type A Aortic Dissection  Severe Persistent Asthma  History of Tracheobronchomalacia  fevers and MSSA bacteremia and tracheal aspirate material with MSSA    -leukocytosis  Fevers and MRSA bacteremia last + culture 9/28  last positive Candida blood culture 9/30  Continue IV vancomycin- trough 15. on 10/18 is therapeutic maintain current dosing   Restarted on IV MEropenem in the setting bump in WBC and fever   SEnd blood cultures x2 sets  send UA and culture  repeat imaging of right elbow with ultrasound        MRSA  bacteremia and candidemia  Will plan to treat for 4-6 weeks in the setting of post surgical repair of thoracic aorta dissection  Continue to follow CBC  Continue to follow creatinine  Continue to follow Vanco trough levels  repeat blood cultures for evidence of fungemia and bacteremia clearance show evidence of clearing of infections  ESBL proteus in elbow fluid on prior tp was sensitive to meropenem      Jeff Calixto MD  Can be called via Teams  After 5pm/weekends [phone]

## 2022-10-21 NOTE — PROCEDURE NOTE - NSPROCNAME_GEN_A_CORE
Bronchoscopy
Central Line Insertion
Arterial Puncture/Cannulation
Arterial Puncture/Cannulation
Bronchoscopy
Interventional Radiology
General
Arterial Puncture/Cannulation
Arterial Puncture/Cannulation
Interventional Radiology
Central Line Insertion
Arterial Puncture/Cannulation
Midline Insertion
Arterial Puncture/Cannulation
Arterial Puncture/Cannulation
Central Line Insertion
Arterial Puncture/Cannulation

## 2022-10-21 NOTE — PROCEDURE NOTE - NSPROCDETAILS_GEN_ALL_CORE
location identified, draped/prepped, sterile technique used, needle inserted/introduced/positive blood return obtained via catheter/sutured in place/hemostasis with direct pressure, dressing applied/Seldinger technique/all materials/supplies accounted for at end of procedure
all materials/supplies accounted for at end of procedure
location identified, draped/prepped, sterile technique used, needle inserted/introduced/positive blood return obtained via catheter/connected to a pressurized flush line/sutured in place/hemostasis with direct pressure, dressing applied/Seldinger technique/all materials/supplies accounted for at end of procedure
guidewire recovered/lumen(s) aspirated and flushed/sterile dressing applied/sterile technique, catheter placed/ultrasound guidance with use of sterile gel and probe cove
location identified, draped/prepped, sterile technique used, needle inserted/introduced/positive blood return obtained via catheter/connected to a pressurized flush line/sutured in place/hemostasis with direct pressure, dressing applied/Seldinger technique/all materials/supplies accounted for at end of procedure
location identified, draped/prepped, sterile technique used, needle inserted/introduced/positive blood return obtained via catheter/connected to a pressurized flush line/sutured in place/hemostasis with direct pressure, dressing applied/Seldinger technique/all materials/supplies accounted for at end of procedure
location identified, draped/prepped, sterile technique used/sterile dressing applied/sterile technique, catheter placed/ultrasound guidance
ultrasound guidance with use of sterile gel and probe cove
location identified, draped/prepped, sterile technique used, needle inserted/introduced/positive blood return obtained via catheter/connected to a pressurized flush line/sutured in place/hemostasis with direct pressure, dressing applied/Seldinger technique/all materials/supplies accounted for at end of procedure
all materials/supplies accounted for at end of procedure
location identified, draped/prepped, sterile technique used, needle inserted/introduced/positive blood return obtained via catheter/connected to a pressurized flush line/sutured in place/hemostasis with direct pressure, dressing applied/all materials/supplies accounted for at end of procedure
guidewire recovered/lumen(s) aspirated and flushed/sterile dressing applied/sterile technique, catheter placed/ultrasound guidance with use of sterile gel and probe cove

## 2022-10-21 NOTE — PROGRESS NOTE ADULT - SUBJECTIVE AND OBJECTIVE BOX
INFECTIOUS DISEASES FOLLOW UP-- Fouzia Calixto  [phone]    This is a follow up note for this  72yFemale with  Dissection of thoracic aorta        ROS:  CONSTITUTIONAL:  No fever, good appetite  CARDIOVASCULAR:  No chest pain or palpitations  RESPIRATORY:  No dyspnea  GASTROINTESTINAL:  No nausea, vomiting, diarrhea, or abdominal pain  GENITOURINARY:  No dysuria  NEUROLOGIC:  No headache,     Allergies    aspirin (Short breath)  Avelox (Short breath; Pruritus)  cefepime (Anaphylaxis)  codeine (Short breath)  Dilaudid (Short breath)  iodine (Short breath; Swelling)  penicillin (Anaphylaxis)  shellfish (Anaphylaxis)  tetanus toxoid (Short breath)  Valium (Short breath)    Intolerances        ANTIBIOTICS/RELEVANT:  antimicrobials  fluconAZOLE IVPB 600 milliGRAM(s) IV Intermittent every 24 hours  meropenem  IVPB 1000 milliGRAM(s) IV Intermittent every 8 hours  tobramycin for Nebulization 300 milliGRAM(s) Inhalation every 12 hours  vancomycin  IVPB 750 milliGRAM(s) IV Intermittent every 12 hours  vancomycin  IVPB        immunologic:    OTHER:  acetaminophen    Suspension .. 650 milliGRAM(s) Oral every 6 hours PRN  acetylcysteine 10%  Inhalation 4 milliLiter(s) Inhalation every 12 hours  albuterol/ipratropium for Nebulization 3 milliLiter(s) Nebulizer every 6 hours  buDESOnide    Inhalation Suspension 0.5 milliGRAM(s) Inhalation every 12 hours  chlorhexidine 0.12% Liquid 15 milliLiter(s) Oral Mucosa every 12 hours  chlorhexidine 2% Cloths 1 Application(s) Topical <User Schedule>  collagenase Ointment 1 Application(s) Topical daily  dextrose 50% Injectable 50 milliLiter(s) IV Push every 15 minutes  dorzolamide 2% Ophthalmic Solution 1 Drop(s) Left EYE three times a day  furosemide   Injectable 40 milliGRAM(s) IV Push two times a day  hydrALAZINE 37.5 milliGRAM(s) Oral every 8 hours  insulin lispro (ADMELOG) corrective regimen sliding scale   SubCutaneous every 6 hours  insulin NPH human recombinant 20 Unit(s) SubCutaneous every 6 hours  magnesium oxide 400 milliGRAM(s) Oral every 8 hours  methylPREDNISolone 8 milliGRAM(s) Oral daily  metoclopramide Injectable 5 milliGRAM(s) IV Push every 8 hours  metoprolol tartrate 12.5 milliGRAM(s) Enteral Tube every 12 hours  mexiletine 200 milliGRAM(s) Oral every 8 hours  multivitamin 1 Tablet(s) Oral daily  pantoprazole  Injectable 40 milliGRAM(s) IV Push daily  sodium chloride 0.9%. 1000 milliLiter(s) IV Continuous <Continuous>  timolol 0.5% Solution 1 Drop(s) Left EYE two times a day      Objective:  Vital Signs Last 24 Hrs  T(C): 36.8 (21 Oct 2022 16:00), Max: 38.6 (21 Oct 2022 08:00)  T(F): 98.2 (21 Oct 2022 16:00), Max: 101.5 (21 Oct 2022 08:00)  HR: 64 (21 Oct 2022 16:00) (59 - 101)  BP: 95/45 (21 Oct 2022 15:00) (80/44 - 140/77)  BP(mean): 65 (21 Oct 2022 15:00) (56 - 102)  RR: 33 (21 Oct 2022 16:00) (22 - 41)  SpO2: 97% (21 Oct 2022 16:00) (92% - 99%)    Parameters below as of 21 Oct 2022 16:00  Patient On (Oxygen Delivery Method): tracheostomy collar    O2 Concentration (%): 40    PHYSICAL EXAM:  Constitutional:no acute distress  Eyes:EBER, EOMI  Ear/Nose/Throat: no oral lesions, 	  Respiratory: clear BL  Cardiovascular: S1S2  Gastrointestinal:soft, (+) BS, no tenderness  Extremities:no e/e/c  No Lymphadenopathy  IV sites not inflammed.    LABS:                        10.9   14.28 )-----------( 384      ( 21 Oct 2022 00:57 )             36.1     10-21    141  |  97  |  22  ----------------------------<  78  3.7   |  32<H>  |  0.55    Ca    10.1      21 Oct 2022 00:57  Phos  3.4     10-21  Mg     1.9     10-21    TPro  7.7  /  Alb  3.5  /  TBili  0.4  /  DBili  x   /  AST  24  /  ALT  24  /  AlkPhos  158<H>  10-21    PTT - ( 21 Oct 2022 00:57 )  PTT:61.2 sec      MICROBIOLOGY:            RECENT CULTURES:      RADIOLOGY & ADDITIONAL STUDIES:  < from: Xray Chest 1 View- PORTABLE-Urgent (Xray Chest 1 View- PORTABLE-Urgent .) (10.21.22 @ 07:06) >  IMPRESSION:  Increased right lower lung field patchy airspace opacities, likely   representing atelectasis.  Lines and tubes as above.    < end of copied text >   INFECTIOUS DISEASES FOLLOW UP-- Fouzia Calixto  [phone]    This is a follow up note for this  72yFemale with  Dissection of thoracic aorta        ROS:  CONSTITUTIONAL:  No fever, good appetite  CARDIOVASCULAR:  No chest pain or palpitations  RESPIRATORY:  No dyspnea  GASTROINTESTINAL:  No nausea, vomiting, diarrhea, or abdominal pain  GENITOURINARY:  No dysuria  NEUROLOGIC:  No headache,     Allergies    aspirin (Short breath)  Avelox (Short breath; Pruritus)  cefepime (Anaphylaxis)  codeine (Short breath)  Dilaudid (Short breath)  iodine (Short breath; Swelling)  penicillin (Anaphylaxis)  shellfish (Anaphylaxis)  tetanus toxoid (Short breath)  Valium (Short breath)    Intolerances        ANTIBIOTICS/RELEVANT:  antimicrobials  fluconAZOLE IVPB 600 milliGRAM(s) IV Intermittent every 24 hours  meropenem  IVPB 1000 milliGRAM(s) IV Intermittent every 8 hours  tobramycin for Nebulization 300 milliGRAM(s) Inhalation every 12 hours  vancomycin  IVPB 750 milliGRAM(s) IV Intermittent every 12 hours  vancomycin  IVPB        immunologic:    OTHER:  acetaminophen    Suspension .. 650 milliGRAM(s) Oral every 6 hours PRN  acetylcysteine 10%  Inhalation 4 milliLiter(s) Inhalation every 12 hours  albuterol/ipratropium for Nebulization 3 milliLiter(s) Nebulizer every 6 hours  buDESOnide    Inhalation Suspension 0.5 milliGRAM(s) Inhalation every 12 hours  chlorhexidine 0.12% Liquid 15 milliLiter(s) Oral Mucosa every 12 hours  chlorhexidine 2% Cloths 1 Application(s) Topical <User Schedule>  collagenase Ointment 1 Application(s) Topical daily  dextrose 50% Injectable 50 milliLiter(s) IV Push every 15 minutes  dorzolamide 2% Ophthalmic Solution 1 Drop(s) Left EYE three times a day  furosemide   Injectable 40 milliGRAM(s) IV Push two times a day  hydrALAZINE 37.5 milliGRAM(s) Oral every 8 hours  insulin lispro (ADMELOG) corrective regimen sliding scale   SubCutaneous every 6 hours  insulin NPH human recombinant 20 Unit(s) SubCutaneous every 6 hours  magnesium oxide 400 milliGRAM(s) Oral every 8 hours  methylPREDNISolone 8 milliGRAM(s) Oral daily  metoclopramide Injectable 5 milliGRAM(s) IV Push every 8 hours  metoprolol tartrate 12.5 milliGRAM(s) Enteral Tube every 12 hours  mexiletine 200 milliGRAM(s) Oral every 8 hours  multivitamin 1 Tablet(s) Oral daily  pantoprazole  Injectable 40 milliGRAM(s) IV Push daily  sodium chloride 0.9%. 1000 milliLiter(s) IV Continuous <Continuous>  timolol 0.5% Solution 1 Drop(s) Left EYE two times a day      Objective:  Vital Signs Last 24 Hrs  T(C): 36.8 (21 Oct 2022 16:00), Max: 38.6 (21 Oct 2022 08:00)  T(F): 98.2 (21 Oct 2022 16:00), Max: 101.5 (21 Oct 2022 08:00)  HR: 64 (21 Oct 2022 16:00) (59 - 101)  BP: 95/45 (21 Oct 2022 15:00) (80/44 - 140/77)  BP(mean): 65 (21 Oct 2022 15:00) (56 - 102)  RR: 33 (21 Oct 2022 16:00) (22 - 41)  SpO2: 97% (21 Oct 2022 16:00) (92% - 99%)    Parameters below as of 21 Oct 2022 16:00  Patient On (Oxygen Delivery Method): tracheostomy collar    O2 Concentration (%): 40    PHYSICAL EXAM:  Constitutional:no acute distress  Eyes:EBER, EOMI  Ear/Nose/Throat: no oral lesions, 	  Respiratory: clear BL  Cardiovascular: S1S2  Gastrointestinal:soft, (+) BS, no tenderness  Extremities:no e/e/c right elbow with large necrotic ulcer and fluctuance aroung the area but no warmth  No Lymphadenopathy  IV sites not inflammed.    LABS:                        10.9   14.28 )-----------( 384      ( 21 Oct 2022 00:57 )             36.1     10-21    141  |  97  |  22  ----------------------------<  78  3.7   |  32<H>  |  0.55    Ca    10.1      21 Oct 2022 00:57  Phos  3.4     10-21  Mg     1.9     10-21    TPro  7.7  /  Alb  3.5  /  TBili  0.4  /  DBili  x   /  AST  24  /  ALT  24  /  AlkPhos  158<H>  10-21    PTT - ( 21 Oct 2022 00:57 )  PTT:61.2 sec      MICROBIOLOGY:            RECENT CULTURES:      RADIOLOGY & ADDITIONAL STUDIES:  < from: Xray Chest 1 View- PORTABLE-Urgent (Xray Chest 1 View- PORTABLE-Urgent .) (10.21.22 @ 07:06) >  IMPRESSION:  Increased right lower lung field patchy airspace opacities, likely   representing atelectasis.  Lines and tubes as above.    < end of copied text >

## 2022-10-22 LAB
ALBUMIN SERPL ELPH-MCNC: 3.4 G/DL — SIGNIFICANT CHANGE UP (ref 3.3–5)
ALP SERPL-CCNC: 126 U/L — HIGH (ref 40–120)
ALT FLD-CCNC: 20 U/L — SIGNIFICANT CHANGE UP (ref 10–45)
ANION GAP SERPL CALC-SCNC: 13 MMOL/L — SIGNIFICANT CHANGE UP (ref 5–17)
APTT BLD: 30.3 SEC — SIGNIFICANT CHANGE UP (ref 27.5–35.5)
APTT BLD: 32.1 SEC — SIGNIFICANT CHANGE UP (ref 27.5–35.5)
AST SERPL-CCNC: 24 U/L — SIGNIFICANT CHANGE UP (ref 10–40)
BILIRUB SERPL-MCNC: 0.3 MG/DL — SIGNIFICANT CHANGE UP (ref 0.2–1.2)
BLD GP AB SCN SERPL QL: NEGATIVE — SIGNIFICANT CHANGE UP
BUN SERPL-MCNC: 33 MG/DL — HIGH (ref 7–23)
CALCIUM SERPL-MCNC: 10 MG/DL — SIGNIFICANT CHANGE UP (ref 8.4–10.5)
CHLORIDE SERPL-SCNC: 101 MMOL/L — SIGNIFICANT CHANGE UP (ref 96–108)
CO2 SERPL-SCNC: 26 MMOL/L — SIGNIFICANT CHANGE UP (ref 22–31)
CREAT SERPL-MCNC: 0.8 MG/DL — SIGNIFICANT CHANGE UP (ref 0.5–1.3)
EGFR: 77 ML/MIN/1.73M2 — SIGNIFICANT CHANGE UP
GAS PNL BLDA: SIGNIFICANT CHANGE UP
GLUCOSE BLDC GLUCOMTR-MCNC: 196 MG/DL — HIGH (ref 70–99)
GLUCOSE BLDC GLUCOMTR-MCNC: 208 MG/DL — HIGH (ref 70–99)
GLUCOSE BLDC GLUCOMTR-MCNC: 211 MG/DL — HIGH (ref 70–99)
GLUCOSE BLDC GLUCOMTR-MCNC: 250 MG/DL — HIGH (ref 70–99)
GLUCOSE BLDC GLUCOMTR-MCNC: 318 MG/DL — HIGH (ref 70–99)
GLUCOSE SERPL-MCNC: 243 MG/DL — HIGH (ref 70–99)
GRAM STN FLD: SIGNIFICANT CHANGE UP
HCT VFR BLD CALC: 29 % — LOW (ref 34.5–45)
HGB BLD-MCNC: 8.5 G/DL — LOW (ref 11.5–15.5)
MAGNESIUM SERPL-MCNC: 3 MG/DL — HIGH (ref 1.6–2.6)
MCHC RBC-ENTMCNC: 23.7 PG — LOW (ref 27–34)
MCHC RBC-ENTMCNC: 29.3 GM/DL — LOW (ref 32–36)
MCV RBC AUTO: 80.8 FL — SIGNIFICANT CHANGE UP (ref 80–100)
NRBC # BLD: 0 /100 WBCS — SIGNIFICANT CHANGE UP (ref 0–0)
PHOSPHATE SERPL-MCNC: 4.6 MG/DL — HIGH (ref 2.5–4.5)
PLATELET # BLD AUTO: 293 K/UL — SIGNIFICANT CHANGE UP (ref 150–400)
POTASSIUM SERPL-MCNC: 4.8 MMOL/L — SIGNIFICANT CHANGE UP (ref 3.5–5.3)
POTASSIUM SERPL-SCNC: 4.8 MMOL/L — SIGNIFICANT CHANGE UP (ref 3.5–5.3)
PROT SERPL-MCNC: 7.1 G/DL — SIGNIFICANT CHANGE UP (ref 6–8.3)
RBC # BLD: 3.59 M/UL — LOW (ref 3.8–5.2)
RBC # FLD: 23.2 % — HIGH (ref 10.3–14.5)
RH IG SCN BLD-IMP: POSITIVE — SIGNIFICANT CHANGE UP
SODIUM SERPL-SCNC: 140 MMOL/L — SIGNIFICANT CHANGE UP (ref 135–145)
SPECIMEN SOURCE: SIGNIFICANT CHANGE UP
VANCOMYCIN TROUGH SERPL-MCNC: 16.2 UG/ML — SIGNIFICANT CHANGE UP (ref 10–20)
VANCOMYCIN TROUGH SERPL-MCNC: 20.7 UG/ML — HIGH (ref 10–20)
WBC # BLD: 19.01 K/UL — HIGH (ref 3.8–10.5)
WBC # FLD AUTO: 19.01 K/UL — HIGH (ref 3.8–10.5)

## 2022-10-22 PROCEDURE — 99232 SBSQ HOSP IP/OBS MODERATE 35: CPT

## 2022-10-22 PROCEDURE — 99291 CRITICAL CARE FIRST HOUR: CPT | Mod: 24

## 2022-10-22 PROCEDURE — 71045 X-RAY EXAM CHEST 1 VIEW: CPT | Mod: 26

## 2022-10-22 RX ORDER — HUMAN INSULIN 100 [IU]/ML
24 INJECTION, SUSPENSION SUBCUTANEOUS EVERY 6 HOURS
Refills: 0 | Status: DISCONTINUED | OUTPATIENT
Start: 2022-10-22 | End: 2022-10-23

## 2022-10-22 RX ADMIN — MEROPENEM 100 MILLIGRAM(S): 1 INJECTION INTRAVENOUS at 22:49

## 2022-10-22 RX ADMIN — Medication 4 MILLILITER(S): at 17:26

## 2022-10-22 RX ADMIN — Medication 300 MILLIGRAM(S): at 17:26

## 2022-10-22 RX ADMIN — MEROPENEM 100 MILLIGRAM(S): 1 INJECTION INTRAVENOUS at 13:14

## 2022-10-22 RX ADMIN — Medication 250 MILLIGRAM(S): at 22:00

## 2022-10-22 RX ADMIN — Medication 37.5 MILLIGRAM(S): at 22:50

## 2022-10-22 RX ADMIN — Medication 40 MILLIGRAM(S): at 05:22

## 2022-10-22 RX ADMIN — FLUCONAZOLE 150 MILLIGRAM(S): 150 TABLET ORAL at 22:48

## 2022-10-22 RX ADMIN — Medication 4: at 05:23

## 2022-10-22 RX ADMIN — HUMAN INSULIN 24 UNIT(S): 100 INJECTION, SUSPENSION SUBCUTANEOUS at 23:36

## 2022-10-22 RX ADMIN — Medication 0.5 MILLIGRAM(S): at 05:04

## 2022-10-22 RX ADMIN — Medication 1 APPLICATION(S): at 13:16

## 2022-10-22 RX ADMIN — MEXILETINE HYDROCHLORIDE 200 MILLIGRAM(S): 150 CAPSULE ORAL at 05:21

## 2022-10-22 RX ADMIN — HUMAN INSULIN 20 UNIT(S): 100 INJECTION, SUSPENSION SUBCUTANEOUS at 13:09

## 2022-10-22 RX ADMIN — Medication 3 MILLILITER(S): at 17:25

## 2022-10-22 RX ADMIN — MEXILETINE HYDROCHLORIDE 200 MILLIGRAM(S): 150 CAPSULE ORAL at 13:13

## 2022-10-22 RX ADMIN — MAGNESIUM OXIDE 400 MG ORAL TABLET 400 MILLIGRAM(S): 241.3 TABLET ORAL at 05:21

## 2022-10-22 RX ADMIN — Medication 4: at 18:24

## 2022-10-22 RX ADMIN — PANTOPRAZOLE SODIUM 40 MILLIGRAM(S): 20 TABLET, DELAYED RELEASE ORAL at 13:14

## 2022-10-22 RX ADMIN — HUMAN INSULIN 24 UNIT(S): 100 INJECTION, SUSPENSION SUBCUTANEOUS at 18:24

## 2022-10-22 RX ADMIN — Medication 12.5 MILLIGRAM(S): at 05:35

## 2022-10-22 RX ADMIN — Medication 1 DROP(S): at 18:25

## 2022-10-22 RX ADMIN — Medication 0.5 MILLIGRAM(S): at 17:26

## 2022-10-22 RX ADMIN — Medication 40 MILLIGRAM(S): at 13:15

## 2022-10-22 RX ADMIN — DORZOLAMIDE HYDROCHLORIDE 1 DROP(S): 20 SOLUTION/ DROPS OPHTHALMIC at 14:00

## 2022-10-22 RX ADMIN — DORZOLAMIDE HYDROCHLORIDE 1 DROP(S): 20 SOLUTION/ DROPS OPHTHALMIC at 22:52

## 2022-10-22 RX ADMIN — Medication 8: at 13:08

## 2022-10-22 RX ADMIN — HUMAN INSULIN 20 UNIT(S): 100 INJECTION, SUSPENSION SUBCUTANEOUS at 05:22

## 2022-10-22 RX ADMIN — Medication 5 MILLIGRAM(S): at 22:51

## 2022-10-22 RX ADMIN — Medication 3 MILLILITER(S): at 11:37

## 2022-10-22 RX ADMIN — HUMAN INSULIN 20 UNIT(S): 100 INJECTION, SUSPENSION SUBCUTANEOUS at 00:42

## 2022-10-22 RX ADMIN — MAGNESIUM OXIDE 400 MG ORAL TABLET 400 MILLIGRAM(S): 241.3 TABLET ORAL at 13:13

## 2022-10-22 RX ADMIN — Medication 5 MILLIGRAM(S): at 13:14

## 2022-10-22 RX ADMIN — MEROPENEM 100 MILLIGRAM(S): 1 INJECTION INTRAVENOUS at 05:21

## 2022-10-22 RX ADMIN — Medication 12.5 MILLIGRAM(S): at 18:25

## 2022-10-22 RX ADMIN — Medication 37.5 MILLIGRAM(S): at 13:15

## 2022-10-22 RX ADMIN — Medication 37.5 MILLIGRAM(S): at 05:21

## 2022-10-22 RX ADMIN — Medication 3 MILLILITER(S): at 23:26

## 2022-10-22 RX ADMIN — Medication 1 TABLET(S): at 13:14

## 2022-10-22 RX ADMIN — Medication 2: at 23:38

## 2022-10-22 RX ADMIN — Medication 300 MILLIGRAM(S): at 05:05

## 2022-10-22 RX ADMIN — CHLORHEXIDINE GLUCONATE 15 MILLILITER(S): 213 SOLUTION TOPICAL at 05:20

## 2022-10-22 RX ADMIN — MAGNESIUM OXIDE 400 MG ORAL TABLET 400 MILLIGRAM(S): 241.3 TABLET ORAL at 22:50

## 2022-10-22 RX ADMIN — Medication 3 MILLILITER(S): at 05:04

## 2022-10-22 RX ADMIN — CHLORHEXIDINE GLUCONATE 1 APPLICATION(S): 213 SOLUTION TOPICAL at 05:24

## 2022-10-22 RX ADMIN — Medication 250 MILLIGRAM(S): at 05:20

## 2022-10-22 RX ADMIN — Medication 4 MILLILITER(S): at 05:04

## 2022-10-22 RX ADMIN — Medication 5 MILLIGRAM(S): at 05:22

## 2022-10-22 RX ADMIN — Medication 8 MILLIGRAM(S): at 05:24

## 2022-10-22 RX ADMIN — MEXILETINE HYDROCHLORIDE 200 MILLIGRAM(S): 150 CAPSULE ORAL at 22:50

## 2022-10-22 RX ADMIN — Medication 4: at 00:43

## 2022-10-22 RX ADMIN — HEPARIN SODIUM 5 UNIT(S)/HR: 5000 INJECTION INTRAVENOUS; SUBCUTANEOUS at 01:32

## 2022-10-22 NOTE — PROGRESS NOTE ADULT - SUBJECTIVE AND OBJECTIVE BOX
Patient seen and examined at the bedside.    Remained critically ill on continuous ICU monitoring.    OBJECTIVE:  Vital Signs Last 24 Hrs  T(C): 36.4 (22 Oct 2022 04:00), Max: 37.3 (21 Oct 2022 12:00)  T(F): 97.5 (22 Oct 2022 04:00), Max: 99.1 (21 Oct 2022 12:00)  HR: 93 (22 Oct 2022 07:00) (57 - 96)  BP: 115/55 (22 Oct 2022 04:00) (80/44 - 134/60)  BP(mean): 79 (22 Oct 2022 04:00) (56 - 90)  RR: 27 (22 Oct 2022 07:00) (22 - 40)  SpO2: 96% (22 Oct 2022 07:00) (92% - 100%)    Parameters below as of 22 Oct 2022 04:00  Patient On (Oxygen Delivery Method): tracheostomy collar    O2 Concentration (%): 40      Physical Exam:   General: OOBTC, NAD  Neurology: interactive, able to follow simple commands, denies pain  ENT/Neck: + trach c/d/i, neck supple, trachea midline   Respiratory: coarse BS b/l, rhonchi throughout.   CV: S1S2, no murmurs        [x]Sinus Rhythm   Abdominal: Soft, NT, ND, colostomy in place  Extremities: +4 RUE edema, 3+LUE edema, trace edema noted, + peripheral pulses   Skin: Dry dressing over right heel, R elbow wound w/ overlying cling dressing c/d/i                         Assessment:  71F PMH DM, COPD, chronic adrenal insufficiency on Prednisone, history of colorectal cancer s/p resection (colostomy bag), Hx of CAD, chronic AF on Eliquis, and tracheomalacia s/p multiple intubations, recent dx of R foot OM s/p debridement on antibiotics and presented to ED at [facility] this morning with epigastric pain, belching and central chest pain. Found on CTA to have type A aortic dissection and transferred to [facility] for surgical evaluation by Dr. Cabrera.     Ascending aortic dissection s/p type A dissection repair and Biobentall on 9/7   Respiratory failure s/p Trach 10/10/22  Hypovolemia   Post op respiratory failure   Acute blood loss anemia  Stress hyperglycemia   Leukocytosis   RIJ thrombus  MRSA PNA      Plan:   ***Neuro***  Off sedation, follows simple commands, continue to monitor  PT/OT progress as tolerated, standing exercises yesterday    ***Cardiovascular***  Limited TTE on 10/1: EF 69%, Hyperdynamic left ventricular systolic function. There is hypokinesis of the mid-base inferior wall. Nml RV fxn.   CT chest on 9/28: No CT evidence of pulmonary embolism. Status post repair of ascending aortic dissection with a stable dissection flap originating from the aortic arch and extending into the infrarenal abdominal aorta,  B/l UE duplex on 10/5: As before, there is an occluded RIGHT IJ vein with thrombosis extending to brachiocephalic vein. Superficial thrombophlebitis of the LEFT cephalic vein.   remains on heparin drip PTT goal (50-60)-> Heparin held for ? IR drainage or debridement of R elbow wound  Invasive hemodynamic monitoring, assess perfusion indices   SR / MAP 67/ Hct 29.0%/ Lactate 0.9  [x] Hydralazine PO for BP management up-titrated to 37.5 TID  Rate control with Mexiletine and Lopressor   [x] AC Therapy with Heparin PTT 50-60 (for Afib and IJ thrombus) - was on hold, now back on  Assess for need to transition from Heparin to alternative anticoagulation   Reassessment of hemodynamics    ***Pulmonary***  Respiratory failure s/p Trach #8 portex  10/10/22, per ENT inner cannula needs to be changed daily, trach sutures d/c'ed by ENT 10/17   Post op vent management   Titration of FiO2 and PEEP, follow SpO2, CXR, blood gasses   Bronched 10/13  Continue with Trach Collar trials   Ambu bag and suction as needed for thick secretions, continue mucomyst/duonebs  Increase secretions this am, inhaled tobra added, bag and suction prn  Mode: vent off            ***GI***  [x] Diet:  TFs restarted after being held overnight for vomitting x1, Glucerna 1.2 @ goal 65ml/hr   [x] Protonix    Reglan for gut motility      ***Renal***  Continue to monitor I/Os, BUN/Creatinine.   Replete lytes PRN  Diuresis with Lasix 40 IV BID      ***ID***  SCx on 10/4+Methicillin resistant Staphylococcus aureus, c/w IVPB Vancomycin, (plan for 6 week course in setting of valve surgery, 11/26end date)  9/27 blood culture Neela Glabarata, on flucanazole (lifelong suppression)  R elbow wound, S/p IR for elbow drainage 10/13 + Few Proteus mirabilis ESBL, Meropenum 7 day trial completed-> reordered 10/20 for reacumulation of elbow brusitis, ortho reconsulted and plastic surgery for ? drainage vs debridement  BCx 10/13 NGTD, BC 10/21 and SC pending  C/w Diflucan   F/u ID for ?PICC for long term abx    ***Endocrine***  [x]  DM : HbA1c 9.4%                - [x] ISS  [x] NPH             - Need tight glycemic control to prevent wound infection.            Patient requires continuous monitoring with bedside rhythm monitoring, pulse oximetry monitoring, and continuous central venous and arterial pressure monitoring; and intermittent blood gas analysis. Care plan discussed with the ICU care team.   Patient remained critical, at risk for life threatening decompensation.    I have spent 30 minutes providing critical care management to this patient.    By signing my name below, I, Maria D'Amico, attest that this documentation has been prepared under the direction and in the presence of Radha Nichols NP   Electronically signed: Maria D'Amico, Scribe, 10-22-22 @ 08:11    I, Radha Nichols NP , personally performed the services described in this documentation. all medical record entries made by the [email or web address] were at my direction and in my presence. I have reviewed the chart and agree that the record reflects my personal performance and is accurate and complete  Electronically signed: Radha Nichols NP  Patient seen and examined at the bedside.    Remained critically ill on continuous ICU monitoring.    OBJECTIVE:  Vital Signs Last 24 Hrs  T(C): 36.4 (22 Oct 2022 04:00), Max: 37.3 (21 Oct 2022 12:00)  T(F): 97.5 (22 Oct 2022 04:00), Max: 99.1 (21 Oct 2022 12:00)  HR: 93 (22 Oct 2022 07:00) (57 - 96)  BP: 115/55 (22 Oct 2022 04:00) (80/44 - 134/60)  BP(mean): 79 (22 Oct 2022 04:00) (56 - 90)  RR: 27 (22 Oct 2022 07:00) (22 - 40)  SpO2: 96% (22 Oct 2022 07:00) (92% - 100%)    Parameters below as of 22 Oct 2022 04:00  Patient On (Oxygen Delivery Method): tracheostomy collar    O2 Concentration (%): 40      Physical Exam:   General: OOBTC, NAD  Neurology: interactive, able to follow simple commands, denies pain  ENT/Neck: + trach c/d/i, neck supple, trachea midline   Respiratory: coarse BS b/l, rhonchi throughout.   CV: S1S2, no murmurs        [x]Sinus Rhythm   Abdominal: Soft, NT, ND, colostomy in place  Extremities: +4 RUE edema, 3+LUE edema, trace edema noted, + peripheral pulses   Skin: Dry dressing over right heel, R elbow wound w/ overlying cling dressing c/d/i                         Assessment:  71F PMH DM, COPD, chronic adrenal insufficiency on Prednisone, history of colorectal cancer s/p resection (colostomy bag), Hx of CAD, chronic AF on Eliquis, and tracheomalacia s/p multiple intubations, recent dx of R foot OM s/p debridement on antibiotics and presented to ED at [facility] this morning with epigastric pain, belching and central chest pain. Found on CTA to have type A aortic dissection and transferred to [facility] for surgical evaluation by Dr. Cabrera.     Ascending aortic dissection s/p type A dissection repair and Biobentall on 9/7   Respiratory failure s/p Trach 10/10/22  Hypovolemia   Post op respiratory failure   Acute blood loss anemia  Stress hyperglycemia   Leukocytosis   RIJ thrombus  MRSA PNA      Plan:   ***Neuro***  Off sedation, follows simple commands, continue to monitor  PT/OT progress as tolerated, standing exercises     ***Cardiovascular***  Limited TTE on 10/1: EF 69%, Hyperdynamic left ventricular systolic function. There is hypokinesis of the mid-base inferior wall. Nml RV fxn.   CT chest on 9/28: No CT evidence of pulmonary embolism. Status post repair of ascending aortic dissection with a stable dissection flap originating from the aortic arch and extending into the infrarenal abdominal aorta,  B/l UE duplex on 10/5: As before, there is an occluded RIGHT IJ vein with thrombosis extending to brachiocephalic vein. Superficial thrombophlebitis of the LEFT cephalic vein.   Invasive hemodynamic monitoring, assess perfusion indices   SR / MAP 67/ Hct 29.0%/ Lactate 0.9  [x] Hydralazine PO for BP management up-titrated to 37.5 TID  Rate control with Mexiletine and Lopressor   [x] AC Therapy with Heparin increased from 500-> 700 PTT 50-60 (for Afib and IJ thrombus)   Assess for need to transition from Heparin to alternative anticoagulation   Reassessment of hemodynamics    ***Pulmonary***  Respiratory failure s/p Trach #8 portex  10/10/22, per ENT inner cannula needs to be changed daily, trach sutures d/c'ed by ENT 10/17   Post op vent management   Titration of FiO2 and PEEP, follow SpO2, CXR, blood gasses   Bronched 10/13  Continue with Trach Collar trials   Ambu bag and suction as needed for thick secretions, continue mucomyst/duonebs  Increase secretions this am, inhaled tobra added, bag and suction prn            ***GI***  [x] Diet:  TFs restarted after being held overnight for vomitting x1, Glucerna 1.2 @ goal 65ml/hr   [x] Protonix    Reglan for gut motility      ***Renal***  Continue to monitor I/Os, BUN/Creatinine.   Replete lytes PRN  Diuresis with Lasix 40 IV BID      ***ID***  SCx on 10/4+Methicillin resistant Staphylococcus aureus, c/w IVPB Vancomycin, (plan for 6 week course in setting of valve surgery, 11/26end date)  9/27 blood culture Neela Glabarata, on flucanazole (lifelong suppression)  R elbow wound, S/p IR for elbow drainage 10/13 + Few Proteus mirabilis ESBL, Meropenum 7 day trial completed 10/19-> reordered 10/20 for reaccumulation of elbow brusitis, ortho reconsulted and plastic surgery for ? drainage vs debridement  BCx 10/13 NGTD, BC 10/21 and SC pending  C/w Diflucan   F/u ID for ?PICC for long term abx    ***Endocrine***  [x]  DM : HbA1c 9.4%                - [x] ISS  [x] NPH             - Need tight glycemic control to prevent wound infection.            Patient requires continuous monitoring with bedside rhythm monitoring, pulse oximetry monitoring, and continuous central venous and arterial pressure monitoring; and intermittent blood gas analysis. Care plan discussed with the ICU care team.   Patient remained critical, at risk for life threatening decompensation.    I have spent 30 minutes providing critical care management to this patient.    By signing my name below, I, Maria D'Amico, attest that this documentation has been prepared under the direction and in the presence of Radha Nichols NP   Electronically signed: Maria D'Amico, Scribe, 10-22-22 @ 08:11    I, Radha Nichols NP , personally performed the services described in this documentation. all medical record entries made by the rogers were at my direction and in my presence. I have reviewed the chart and agree that the record reflects my personal performance and is accurate and complete  Electronically signed: Radha Nichols NP  Patient seen and examined at the bedside.    Remained critically ill on continuous ICU monitoring.    OBJECTIVE:  Vital Signs Last 24 Hrs  T(C): 36.4 (22 Oct 2022 04:00), Max: 37.3 (21 Oct 2022 12:00)  T(F): 97.5 (22 Oct 2022 04:00), Max: 99.1 (21 Oct 2022 12:00)  HR: 93 (22 Oct 2022 07:00) (57 - 96)  BP: 115/55 (22 Oct 2022 04:00) (80/44 - 134/60)  BP(mean): 79 (22 Oct 2022 04:00) (56 - 90)  RR: 27 (22 Oct 2022 07:00) (22 - 40)  SpO2: 96% (22 Oct 2022 07:00) (92% - 100%)    Parameters below as of 22 Oct 2022 04:00  Patient On (Oxygen Delivery Method): tracheostomy collar    O2 Concentration (%): 40      Physical Exam:   General: OOBTC, NAD  Neurology: interactive, able to follow simple commands, denies pain  ENT/Neck: + trach c/d/i, neck supple, trachea midline   Respiratory: coarse BS b/l, rhonchi throughout.   CV: S1S2, no murmurs        [x]Sinus Rhythm   Abdominal: Soft, NT, ND, colostomy in place  Extremities: +4 RUE edema, 3+LUE edema, trace edema noted, + peripheral pulses   Skin: Dry dressing over right heel, R elbow wound w/ overlying cling dressing c/d/i                         Assessment:  71F PMH DM, COPD, chronic adrenal insufficiency on Prednisone, history of colorectal cancer s/p resection (colostomy bag), Hx of CAD, chronic AF on Eliquis, and tracheomalacia s/p multiple intubations, recent dx of R foot OM s/p debridement on antibiotics and presented to ED at [facility] this morning with epigastric pain, belching and central chest pain. Found on CTA to have type A aortic dissection and transferred to [facility] for surgical evaluation by Dr. Cabrera.     Ascending aortic dissection s/p type A dissection repair and Biobentall on 9/7   Respiratory failure s/p Trach 10/10/22  Hypovolemia   Post op respiratory failure   Acute blood loss anemia  Stress hyperglycemia   Leukocytosis   RIJ thrombus  MRSA PNA      Plan:   ***Neuro***  follows simple commands, continue to monitor  PT/OT progress as tolerated, standing exercises     ***Cardiovascular***  Limited TTE on 10/1: EF 69%, Hyperdynamic left ventricular systolic function. There is hypokinesis of the mid-base inferior wall. Nml RV fxn.   CT chest on 9/28: No CT evidence of pulmonary embolism. Status post repair of ascending aortic dissection with a stable dissection flap originating from the aortic arch and extending into the infrarenal abdominal aorta,  B/l UE duplex on 10/5: As before, there is an occluded RIGHT IJ vein with thrombosis extending to brachiocephalic vein. Superficial thrombophlebitis of the LEFT cephalic vein.   Invasive hemodynamic monitoring, assess perfusion indices   SR / MAP 67/ Hct 29.0%/ Lactate 0.9  [x] Hydralazine PO for BP management up-titrated to 37.5 TID  Rate control with Mexiletine and Lopressor   [x] AC Therapy with Heparin increased from 500-> 700 PTT 50-60 (for Afib and IJ thrombus)   Assess for need to transition from Heparin to alternative anticoagulation   Reassessment of hemodynamics    ***Pulmonary***  Respiratory failure s/p Trach #8 portex  10/10/22, per ENT inner cannula needs to be changed daily, trach sutures d/c'ed by ENT 10/17   Post op vent management   Titration of FiO2 and PEEP, follow SpO2, CXR, blood gasses   Bronched 10/13  Continue with Trach Collar trials   Ambu bag and suction as needed for thick secretions, continue mucomyst/duonebs   inhaled tobra added 10/21 for increase secretions, bag and suction prn            ***GI***  [x] Diet:  TFs Glucerna 1.2 @ goal 65ml/hr, tolerating without issues  [x] Protonix    Reglan for gut motility      ***Renal***  Continue to monitor I/Os, BUN/Creatinine.   Replete lytes PRN  Diuresis with Lasix 40 IV BID      ***ID***  SCx on 10/4+Methicillin resistant Staphylococcus aureus, c/w IVPB Vancomycin, (plan for 6 week course in setting of valve surgery, 11/26end date)  9/27 blood culture Neela Glabarata, on flucanazole (lifelong suppression)  R elbow wound, S/p IR for elbow drainage 10/13 + Few Proteus mirabilis ESBL, Meropenum 7 day trial completed 10/19-> reordered 10/20 for reaccumulation of elbow brusitis, ortho reconsulted and plastic surgery for ? drainage vs debridement  BCx 10/13 NGTD, BC 10/21 and SC pending  C/w Diflucan   10/22 plastics debrided R elbow eschar to subc tissue, ~8cc fluid aspirated and sent for culture. Wet to dry dressing changed 2x daily and plan for PT to place vac on Monday 10/24.      ***Endocrine***  [x]  DM : HbA1c 9.4%                - [x] ISS  [x] NPH             - Need tight glycemic control to prevent wound infection.            Patient requires continuous monitoring with bedside rhythm monitoring, pulse oximetry monitoring, and continuous central venous and arterial pressure monitoring; and intermittent blood gas analysis. Care plan discussed with the ICU care team.   Patient remained critical, at risk for life threatening decompensation.    I have spent 40 minutes providing critical care management to this patient.    By signing my name below, I, Maria D'Amico, attest that this documentation has been prepared under the direction and in the presence of Radha Nichols NP   Electronically signed: Maria D'Amico, Scribe, 10-22-22 @ 08:11    I, Radha Nichols NP , personally performed the services described in this documentation. all medical record entries made by the rogers were at my direction and in my presence. I have reviewed the chart and agree that the record reflects my personal performance and is accurate and complete  Electronically signed: Radha Nichols NP

## 2022-10-22 NOTE — CONSULT NOTE ADULT - ASSESSMENT
Right elbow eschar  Debridement performed of eschar down to subcutaneous tissue, serosanguinous fluid extracted and some old hematoma removed.  No joint exposure.  recommend wet to moist dressing for next few days then wound vac  Cx sent by ctu team

## 2022-10-22 NOTE — PROGRESS NOTE ADULT - PROBLEM SELECTOR PLAN 1
-test BG q6h if NPO/TF   -Increase NPH dose 24 units q 6h while on TFs. Administer 12 untis if BG <120 while on TFs. HOLD IF TFS ARE OFF.  -C/w Admelog moderate correction scale q6h for now  -notify endocrine team if any change to TF regimen/diet  Discharge plan:  - Likely to discharge patient home on basal/bolus insulin. Final regimen pending clinical course.  - Recommend routine outpatient ophthalmology, podiatry  - Can f/u with endocrinologist Dr. Saavedra.  - Make sure pt has Rx for all DM supplies and insulin/ DM meds.  -Based on pt's clinical condition and mental status at this time she is not able to independently manage her DM. Will evaluate pt's ability to manage DM at time of discharge. If unable> pt will need family/ care giver (s) to manage DM care at home  -Will need rehab.

## 2022-10-22 NOTE — PROGRESS NOTE ADULT - SUBJECTIVE AND OBJECTIVE BOX
Diabetes Follow up note:    Chief complaint: T2DM    Interval Hx: BG values 200-300s over past 24 hours since TF restarted. Pt seen at bedside w/RN present. RN endorsed pt tolerating TF @ goal. Debridement of R elbow wound performed earlier at bedside.     Review of Systems:  General: no complaints.   GI: Tolerating TFs. Denies N/V/D/Abd pain  CV: Denies CP/SOB  ENDO: No S&Sx of hypoglycemia    MEDS:  insulin lispro (ADMELOG) corrective regimen sliding scale   SubCutaneous every 6 hours  insulin NPH human recombinant 24 Unit(s) SubCutaneous every 6 hours  methylPREDNISolone 8 milliGRAM(s) Oral daily    fluconAZOLE IVPB 600 milliGRAM(s) IV Intermittent every 24 hours  meropenem  IVPB 1000 milliGRAM(s) IV Intermittent every 8 hours  tobramycin for Nebulization 300 milliGRAM(s) Inhalation every 12 hours  vancomycin  IVPB 750 milliGRAM(s) IV Intermittent every 12 hours  vancomycin  IVPB        Allergies    aspirin (Short breath)  Avelox (Short breath; Pruritus)  cefepime (Anaphylaxis)  codeine (Short breath)  Dilaudid (Short breath)  iodine (Short breath; Swelling)  penicillin (Anaphylaxis)  shellfish (Anaphylaxis)  tetanus toxoid (Short breath)  Valium (Short breath)      PE:  General: Female sitting in chair. NAD>   Vital Signs Last 24 Hrs  T(C): 36.7 (22 Oct 2022 08:00), Max: 37.1 (22 Oct 2022 00:00)  T(F): 98 (22 Oct 2022 08:00), Max: 98.7 (22 Oct 2022 00:00)  HR: 93 (22 Oct 2022 12:00) (57 - 101)  BP: 115/55 (22 Oct 2022 04:00) (95/45 - 131/57)  BP(mean): 79 (22 Oct 2022 04:00) (65 - 82)  RR: 25 (22 Oct 2022 12:00) (22 - 42)  SpO2: 98% (22 Oct 2022 12:00) (95% - 100%)    Parameters below as of 22 Oct 2022 11:41  Patient On (Oxygen Delivery Method): tracheostomy collar      HEENT: NGT in place. Trach in place  Chest: Sternal incision healed  Abd: Soft, NT,ND,   Extremities: Warm  Neuro: Alert. Nods yes/no to questions.     LABS:  POCT Blood Glucose.: 318 mg/dL (10-22-22 @ 13:06)  POCT Blood Glucose.: 208 mg/dL (10-22-22 @ 05:19)  POCT Blood Glucose.: 250 mg/dL (10-22-22 @ 00:39)  POCT Blood Glucose.: 281 mg/dL (10-21-22 @ 17:46)  POCT Blood Glucose.: 246 mg/dL (10-21-22 @ 13:18)  POCT Blood Glucose.: 91 mg/dL (10-21-22 @ 05:24)  POCT Blood Glucose.: 28 mg/dL (10-21-22 @ 05:23)  POCT Blood Glucose.: 81 mg/dL (10-21-22 @ 04:02)  POCT Blood Glucose.: 93 mg/dL (10-21-22 @ 00:01)  POCT Blood Glucose.: 198 mg/dL (10-20-22 @ 17:56)  POCT Blood Glucose.: 146 mg/dL (10-20-22 @ 11:31)  POCT Blood Glucose.: 103 mg/dL (10-20-22 @ 05:37)  POCT Blood Glucose.: 162 mg/dL (10-20-22 @ 00:02)  POCT Blood Glucose.: 189 mg/dL (10-19-22 @ 17:30)                            8.5    19.01 )-----------( 293      ( 22 Oct 2022 00:53 )             29.0       10-22    140  |  101  |  33<H>  ----------------------------<  243<H>  4.8   |  26  |  0.80    eGFR: 77    Ca    10.0      10-22  Mg     3.0     10-22  Phos  4.6     10-22    TPro  7.1  /  Alb  3.4  /  TBili  0.3  /  DBili  x   /  AST  24  /  ALT  20  /  AlkPhos  126<H>  10-22      Thyroid Function Tests:  10-03 @ 04:47 TSH 0.32 FreeT4 -- T3 -- Anti TPO -- Anti Thyroglobulin Ab -- TSI --      A1C with Estimated Average Glucose Result: 9.4 % (09-06-22 @ 16:46)  A1C with Estimated Average Glucose Result: 9.2 % (07-11-22 @ 07:36)  A1C with Estimated Average Glucose Result: 8.0 % (05-10-22 @ 12:26)  A1C with Estimated Average Glucose Result: 9.5 % (03-29-22 @ 13:50)  A1C with Estimated Average Glucose Result: 7.8 % (11-30-21 @ 09:05)          Contact number: beeper [phone] or [phone]

## 2022-10-22 NOTE — PROGRESS NOTE ADULT - ASSESSMENT
72F w/h/o uncontrolled T2DM (A1C 9.4%) on basal/bolus insulin PTA. Unknown DM complications. Also COPD, secondary adrenal Insufficiency on chronic steroids, colorectal cancer s/p resection (colostomy bag), Chronic A fib on Eliquis, and tracheomalacia and multiple intubations, recent dx of OM presented to ED at [facility] with epigastric pain, belching and central chest pain. Found on CTA to have type A aortic dissection and transferred to [facility] for surgical evaluation by Dr. Cabrera. Now s/p aortic dissection repair on 9/07/22. Endocrine consulted for assistance with uncontrolled DM/steroids for AI. Pt in ICU with ventricular dysfunction/sepsis, and now s/p trach 10/10. Restarted on TF/tolerating over past 24 hours however glucose values now elevated in 200-300 range on previous insulin regimen (previously at goal). Noted elevated WBC and elbow debridement today. Will increase insulin regimen given persistent elevation at this time. BG goal (100-180mg/dl).

## 2022-10-22 NOTE — PROGRESS NOTE ADULT - PROBLEM SELECTOR PLAN 3
Off rosuvastatin 5mg daily  Re start if not contraindicated and as per cardiology  -F/u levels as out pt.      discussed w/pt, RN and CT surgery NP  Can be reached via TEAMS/pager: [phone]   office:  [phone] (M-F 9a-5pm)               [phone] (nights/weekends)   [email or web address] password [email or web address]

## 2022-10-22 NOTE — CONSULT NOTE ADULT - SUBJECTIVE AND OBJECTIVE BOX
Plastic Surgery Consult Note  ([phone])    HPI:  71F PMH DM, COPD, Chronic Adrenal Insufficiency on Chronic prednisone, history of colorectal cancer s/p resection (colostomy bag), Hx of CAD, Chronic A fib on Eliquis, and tracheomalacia and multiple intubations, recent dx of OM presented to ED at [facility] this morning with epigastric pain, belching and central chest pain. Found on CTA to have type A aortic dissection and transferred to [facility] for surgical evaluation by Dr. Cabrera.    Pt had prolonged hospital course.  Called to evaluate right elbow eschar which developed from pressure.  Pt noted to have fluid over right elbow s/p IR drainage which grew proteus.  Now reaccumulated with fluid.  Pt states area without pain.      PAST MEDICAL & SURGICAL HISTORY:  Atrial fibrillation  paroxysmal, on eliquis      Diabetes  Type 2      COPD (chronic obstructive pulmonary disease)      Adrenal insufficiency  Medrol daily for over 50 years      Aortic insufficiency  moderate AR on echo 5/3/2018      Pelvic fracture      Asthma      Tracheobronchomalacia  diagnosed 2015, s/p bronchial thermoplasty 2016 (Dr Zapien); recent bronchoscopy 6/5/2018 revealed no evidence of tracheobronchomalacia in trachea or bronchial tubes      Colorectal cancer  4/2018- last treatment , chemo and radiation      Rectal bleeding      Seizure  x 1 1/7/18      DVT (deep venous thrombosis)  15-20 years ago, took coumadin      TIA (transient ischemic attack)  multiple, last 5 years ago - presents as right-sided weakness      History of partial hysterectomy  30 years ago - fibroids      H/O total knee replacement, bilateral  5 years ago      History of sinus surgery  multiple sinus surgeries      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      H/O pelvic surgery  5 years ago - s/p fracture      History of tracheomalacia  2015 - attempted tracheal stenting ([facility])- course complicated by obstruction, respiratory failure, multiple CPR attempts -  stent discontinued; 10/20/2016 Tracheobronchoplasty (Prolene Mesh) performed at [facility] by Dr Zapien      S/P bronchoscopy  6/5/2018 - [place] (Dr Zapien) no evidence of tracheobronchomalacia in trachea or bronchial tubes      Rectal bleeding  exam under anesthesia ([facility]) 2/2018          Allergies    aspirin (Short breath)  Avelox (Short breath; Pruritus)  cefepime (Anaphylaxis)  codeine (Short breath)  Dilaudid (Short breath)  iodine (Short breath; Swelling)  penicillin (Anaphylaxis)  shellfish (Anaphylaxis)  tetanus toxoid (Short breath)  Valium (Short breath)    Intolerances        Home Medications:  Admelog 100 units/mL injectable solution: 9 unit(s) injectable 3 times a day (before meals) (11 Jul 2022 17:53)  apixaban 5 mg oral tablet: 1 tab(s) orally every 12 hours (10 May 2022 18:50)  ascorbic acid 500 mg oral tablet: 1 tab(s) orally once a day (10 May 2022 18:50)  budesonide-formoterol 160 mcg-4.5 mcg/inh inhalation aerosol: 2 puff(s) inhaled 2 times a day (13 May 2022 12:17)  Crestor 5 mg oral tablet: 1 tab(s) orally once a day (at bedtime) (10 May 2022 18:50)  dilTIAZem 60 mg oral tablet: 1 tab(s) orally 2 times a day (08 Jul 2022 16:28)  ertapenem 1 g injection: 1 gram(s) intravenous once a day (11 Jul 2022 17:54)  fluticasone 50 mcg/inh nasal spray: 1 spray(s) nasal 2 times a day (10 May 2022 18:50)  hydrOXYzine hydrochloride 25 mg oral tablet: 1 tab(s) orally 2 times a day, As needed, Itching x 7 days (13 May 2022 12:17)  insulin glargine 100 units/mL subcutaneous solution: 15 unit(s) subcutaneous once a day (at bedtime) (11 Jul 2022 17:53)  ipratropium-albuterol 0.5 mg-2.5 mg/3 mL inhalation solution: 3 milliliter(s) inhaled every 6 hours (10 May 2022 18:50)  lactulose 10 g/15 mL oral syrup: 22.5 milliliter(s) orally once a day (10 May 2022 18:50)  lisinopril 5 mg oral tablet: 1 tab(s) orally once a day (11 Jul 2022 13:49)  methylPREDNISolone 8 mg oral tablet: 1 tab(s) orally once a day (10 May 2022 18:50)  mexiletine 200 mg oral capsule: 1 cap(s) orally 3 times a day (10 May 2022 18:50)  Multiple Vitamins oral tablet: 1 tab(s) orally once a day (10 May 2022 18:50)  oxycodone-acetaminophen 5 mg-325 mg oral tablet: 1 tab(s) orally every 6 hours, As needed, Severe Pain (7 - 10) (11 Jul 2022 13:49)  pantoprazole 40 mg oral delayed release tablet: 1 tab(s) orally once a day (before a meal) (10 May 2022 18:50)  polyethylene glycol 3350 oral powder for reconstitution: 17 gram(s) orally 2 times a day (10 May 2022 18:50)  pregabalin 150 mg oral capsule: 1 cap(s) orally 2 times a day (10 May 2022 18:50)  senna oral tablet: 2 tab(s) orally once a day (at bedtime) (10 May 2022 18:50)  Spiriva 18 mcg inhalation capsule: 1 cap(s) inhaled once a day (10 May 2022 18:50)  vancomycin 1 g intravenous injection: 1 gram(s) intravenous every 12 hours (08 Jul 2022 16:28)  zinc sulfate 220 mg oral capsule: 1 cap(s) orally once a day (10 May 2022 18:50)      MEDICATIONS  (STANDING):  acetylcysteine 10%  Inhalation 4 milliLiter(s) Inhalation every 12 hours  albuterol/ipratropium for Nebulization 3 milliLiter(s) Nebulizer every 6 hours  buDESOnide    Inhalation Suspension 0.5 milliGRAM(s) Inhalation every 12 hours  chlorhexidine 2% Cloths 1 Application(s) Topical <User Schedule>  collagenase Ointment 1 Application(s) Topical daily  dextrose 50% Injectable 50 milliLiter(s) IV Push every 15 minutes  dorzolamide 2% Ophthalmic Solution 1 Drop(s) Left EYE three times a day  fluconAZOLE IVPB 600 milliGRAM(s) IV Intermittent every 24 hours  furosemide   Injectable 40 milliGRAM(s) IV Push two times a day  heparin  Infusion 500 Unit(s)/Hr (7 mL/Hr) IV Continuous <Continuous>  hydrALAZINE 37.5 milliGRAM(s) Oral every 8 hours  insulin lispro (ADMELOG) corrective regimen sliding scale   SubCutaneous every 6 hours  insulin NPH human recombinant 20 Unit(s) SubCutaneous every 6 hours  magnesium oxide 400 milliGRAM(s) Oral every 8 hours  meropenem  IVPB 1000 milliGRAM(s) IV Intermittent every 8 hours  methylPREDNISolone 8 milliGRAM(s) Oral daily  metoclopramide Injectable 5 milliGRAM(s) IV Push every 8 hours  metoprolol tartrate 12.5 milliGRAM(s) Enteral Tube every 12 hours  mexiletine 200 milliGRAM(s) Oral every 8 hours  multivitamin 1 Tablet(s) Oral daily  pantoprazole  Injectable 40 milliGRAM(s) IV Push daily  sodium chloride 0.9%. 1000 milliLiter(s) (10 mL/Hr) IV Continuous <Continuous>  timolol 0.5% Solution 1 Drop(s) Left EYE two times a day  tobramycin for Nebulization 300 milliGRAM(s) Inhalation every 12 hours  vancomycin  IVPB 750 milliGRAM(s) IV Intermittent every 12 hours  vancomycin  IVPB          SOCIAL HISTORY:    FAMILY HISTORY:  Family history of asthma    Family history of breast cancer (Sibling)    Family history of diabetes mellitus type II        ___________________________________________  REVIEW OF SYSTEMS:    Constitutional: No fevers, chills, no recent weight loss  ENMT: No changes in hearing, no changes in vision, no sore throat, no cough  Respiratory: No shortness of breath  Cardiovascular: No chest pain, palpitations  Gastrointestinal: No abdominal pain, no diarrhea/constipation  Genitourinary: No dysuria, frequency, or urgency    Extremities: No joint swelling, no limited range of movement  Neurological: No paresthesia  Skin: No rashes    ___________________________________________  OBJECTIVE:  Vital Signs Last 24 Hrs  T(C): 36.7 (22 Oct 2022 08:00), Max: 37.1 (22 Oct 2022 00:00)  T(F): 98 (22 Oct 2022 08:00), Max: 98.7 (22 Oct 2022 00:00)  HR: 98 (22 Oct 2022 11:41) (57 - 101)  BP: 115/55 (22 Oct 2022 04:00) (80/44 - 134/60)  BP(mean): 79 (22 Oct 2022 04:00) (56 - 90)  RR: 42 (22 Oct 2022 09:32) (22 - 42)  SpO2: 98% (22 Oct 2022 11:41) (94% - 100%)    Parameters below as of 22 Oct 2022 11:41  Patient On (Oxygen Delivery Method): tracheostomy collar    CAPILLARY BLOOD GLUCOSE      POCT Blood Glucose.: 208 mg/dL (22 Oct 2022 05:19)    I&O's Detail    21 Oct 2022 07:01  -  22 Oct 2022 07:00  --------------------------------------------------------  IN:    Albumin 5%  - 250 mL: 500 mL    Enteral Tube Flush: 115 mL    Glucerna 1.5: 1185 mL    Heparin: 30 mL    IV PiggyBack: 200 mL    IV PiggyBack: 900 mL    sodium chloride 0.9%: 120 mL  Total IN: 3050 mL    OUT:    Colostomy (mL): 1000 mL    Heparin: 0 mL    Voided (mL): 1100 mL  Total OUT: 2100 mL    Total NET: 950 mL      22 Oct 2022 07:01  -  22 Oct 2022 12:30  --------------------------------------------------------  IN:    Glucerna 1.5: 130 mL    Heparin: 10 mL    sodium chloride 0.9%: 20 mL  Total IN: 160 mL    OUT:  Total OUT: 0 mL    Total NET: 160 mL          PHYSICAL EXAM:    General: Awake, trach collar  Extremities:  right elbow with eschar 4 x 3 cm, fluctuance, nontender.  MSK:   ____________________________________________  LABS:  CBC Full  -  ( 22 Oct 2022 00:53 )  WBC Count : 19.01 K/uL  RBC Count : 3.59 M/uL  Hemoglobin : 8.5 g/dL  Hematocrit : 29.0 %  Platelet Count - Automated : 293 K/uL  Mean Cell Volume : 80.8 fl  Mean Cell Hemoglobin : 23.7 pg  Mean Cell Hemoglobin Concentration : 29.3 gm/dL  Auto Neutrophil # : x  Auto Lymphocyte # : x  Auto Monocyte # : x  Auto Eosinophil # : x  Auto Basophil # : x  Auto Neutrophil % : x  Auto Lymphocyte % : x  Auto Monocyte % : x  Auto Eosinophil % : x  Auto Basophil % : x    10-22    140  |  101  |  33<H>  ----------------------------<  243<H>  4.8   |  26  |  0.80    Ca    10.0      22 Oct 2022 00:53  Phos  4.6     10-22  Mg     3.0     10-22    TPro  7.1  /  Alb  3.4  /  TBili  0.3  /  DBili  x   /  AST  24  /  ALT  20  /  AlkPhos  126<H>  10-22    LIVER FUNCTIONS - ( 22 Oct 2022 00:53 )  Alb: 3.4 g/dL / Pro: 7.1 g/dL / ALK PHOS: 126 U/L / ALT: 20 U/L / AST: 24 U/L / GGT: x           PTT - ( 22 Oct 2022 08:55 )  PTT:30.3 sec        ____________________________________________  MICRO:    Culture - Sputum (collected 21 Oct 2022 10:04)  Source: .Sputum Sputum trap  Gram Stain (21 Oct 2022 19:41):    Moderate polymorphonuclear leukocytes per low power field    Few Squamous epithelial cells per low power field    Rare Gram positive cocci in pairs per oil power field    Culture - Blood (collected 21 Oct 2022 05:05)  Source: .Blood Blood-Peripheral  Preliminary Report (22 Oct 2022 12:01):    No growth to date.    Culture - Blood (collected 21 Oct 2022 05:04)  Source: .Blood Blood  Preliminary Report (22 Oct 2022 12:01):    No growth to date.      ____________________________________________  RADIOLOGY:

## 2022-10-22 NOTE — PROGRESS NOTE ADULT - NUTRITIONAL ASSESSMENT
Diet, NPO with Tube Feed:   Tube Feeding Modality: Nasogastric  Glucerna 1.2 Chago (GLUCERNARTH)  Total Volume for 24 Hours (mL): 1560  Continuous  Starting Tube Feed Rate {mL per Hour}: 65  Until Goal Tube Feed Rate (mL per Hour): 65  Tube Feed Duration (in Hours): 24  Tube Feed Start Time: 08:20 (10-17-22 @ 08:23) [Active]

## 2022-10-23 LAB
ALBUMIN SERPL ELPH-MCNC: 3.5 G/DL — SIGNIFICANT CHANGE UP (ref 3.3–5)
ALP SERPL-CCNC: 126 U/L — HIGH (ref 40–120)
ALT FLD-CCNC: 23 U/L — SIGNIFICANT CHANGE UP (ref 10–45)
ANION GAP SERPL CALC-SCNC: 12 MMOL/L — SIGNIFICANT CHANGE UP (ref 5–17)
APTT BLD: 57.6 SEC — HIGH (ref 27.5–35.5)
AST SERPL-CCNC: 22 U/L — SIGNIFICANT CHANGE UP (ref 10–40)
BILIRUB SERPL-MCNC: 0.3 MG/DL — SIGNIFICANT CHANGE UP (ref 0.2–1.2)
BUN SERPL-MCNC: 24 MG/DL — HIGH (ref 7–23)
CALCIUM SERPL-MCNC: 10 MG/DL — SIGNIFICANT CHANGE UP (ref 8.4–10.5)
CHLORIDE SERPL-SCNC: 99 MMOL/L — SIGNIFICANT CHANGE UP (ref 96–108)
CO2 SERPL-SCNC: 33 MMOL/L — HIGH (ref 22–31)
CREAT SERPL-MCNC: 0.49 MG/DL — LOW (ref 0.5–1.3)
CULTURE RESULTS: SIGNIFICANT CHANGE UP
EGFR: 99 ML/MIN/1.73M2 — SIGNIFICANT CHANGE UP
GAS PNL BLDA: SIGNIFICANT CHANGE UP
GAS PNL BLDA: SIGNIFICANT CHANGE UP
GLUCOSE BLDC GLUCOMTR-MCNC: 201 MG/DL — HIGH (ref 70–99)
GLUCOSE BLDC GLUCOMTR-MCNC: 207 MG/DL — HIGH (ref 70–99)
GLUCOSE BLDC GLUCOMTR-MCNC: 78 MG/DL — SIGNIFICANT CHANGE UP (ref 70–99)
GLUCOSE BLDC GLUCOMTR-MCNC: 84 MG/DL — SIGNIFICANT CHANGE UP (ref 70–99)
GLUCOSE SERPL-MCNC: 188 MG/DL — HIGH (ref 70–99)
HCT VFR BLD CALC: 31.4 % — LOW (ref 34.5–45)
HGB BLD-MCNC: 9.1 G/DL — LOW (ref 11.5–15.5)
MCHC RBC-ENTMCNC: 23.5 PG — LOW (ref 27–34)
MCHC RBC-ENTMCNC: 29 GM/DL — LOW (ref 32–36)
MCV RBC AUTO: 80.9 FL — SIGNIFICANT CHANGE UP (ref 80–100)
NRBC # BLD: 0 /100 WBCS — SIGNIFICANT CHANGE UP (ref 0–0)
PLATELET # BLD AUTO: 335 K/UL — SIGNIFICANT CHANGE UP (ref 150–400)
POTASSIUM SERPL-MCNC: 4 MMOL/L — SIGNIFICANT CHANGE UP (ref 3.5–5.3)
POTASSIUM SERPL-SCNC: 4 MMOL/L — SIGNIFICANT CHANGE UP (ref 3.5–5.3)
PROT SERPL-MCNC: 7.5 G/DL — SIGNIFICANT CHANGE UP (ref 6–8.3)
RBC # BLD: 3.88 M/UL — SIGNIFICANT CHANGE UP (ref 3.8–5.2)
RBC # FLD: 22.8 % — HIGH (ref 10.3–14.5)
SODIUM SERPL-SCNC: 144 MMOL/L — SIGNIFICANT CHANGE UP (ref 135–145)
SPECIMEN SOURCE: SIGNIFICANT CHANGE UP
WBC # BLD: 14.44 K/UL — HIGH (ref 3.8–10.5)
WBC # FLD AUTO: 14.44 K/UL — HIGH (ref 3.8–10.5)

## 2022-10-23 PROCEDURE — 71045 X-RAY EXAM CHEST 1 VIEW: CPT | Mod: 26

## 2022-10-23 PROCEDURE — 99291 CRITICAL CARE FIRST HOUR: CPT | Mod: 24

## 2022-10-23 PROCEDURE — 99231 SBSQ HOSP IP/OBS SF/LOW 25: CPT

## 2022-10-23 RX ORDER — APIXABAN 2.5 MG/1
5 TABLET, FILM COATED ORAL EVERY 12 HOURS
Refills: 0 | Status: DISCONTINUED | OUTPATIENT
Start: 2022-10-23 | End: 2022-11-09

## 2022-10-23 RX ORDER — FUROSEMIDE 40 MG
40 TABLET ORAL DAILY
Refills: 0 | Status: DISCONTINUED | OUTPATIENT
Start: 2022-10-24 | End: 2022-10-30

## 2022-10-23 RX ORDER — HUMAN INSULIN 100 [IU]/ML
12 INJECTION, SUSPENSION SUBCUTANEOUS EVERY 6 HOURS
Refills: 0 | Status: DISCONTINUED | OUTPATIENT
Start: 2022-10-23 | End: 2022-10-23

## 2022-10-23 RX ORDER — APIXABAN 2.5 MG/1
2.5 TABLET, FILM COATED ORAL EVERY 12 HOURS
Refills: 0 | Status: DISCONTINUED | OUTPATIENT
Start: 2022-10-23 | End: 2022-10-23

## 2022-10-23 RX ORDER — HUMAN INSULIN 100 [IU]/ML
20 INJECTION, SUSPENSION SUBCUTANEOUS EVERY 6 HOURS
Refills: 0 | Status: DISCONTINUED | OUTPATIENT
Start: 2022-10-23 | End: 2022-10-24

## 2022-10-23 RX ADMIN — HUMAN INSULIN 20 UNIT(S): 100 INJECTION, SUSPENSION SUBCUTANEOUS at 11:52

## 2022-10-23 RX ADMIN — Medication 3 MILLILITER(S): at 17:11

## 2022-10-23 RX ADMIN — HUMAN INSULIN 20 UNIT(S): 100 INJECTION, SUSPENSION SUBCUTANEOUS at 17:12

## 2022-10-23 RX ADMIN — Medication 0.5 MILLIGRAM(S): at 17:11

## 2022-10-23 RX ADMIN — CHLORHEXIDINE GLUCONATE 1 APPLICATION(S): 213 SOLUTION TOPICAL at 05:53

## 2022-10-23 RX ADMIN — PANTOPRAZOLE SODIUM 40 MILLIGRAM(S): 20 TABLET, DELAYED RELEASE ORAL at 11:30

## 2022-10-23 RX ADMIN — MEXILETINE HYDROCHLORIDE 200 MILLIGRAM(S): 150 CAPSULE ORAL at 21:48

## 2022-10-23 RX ADMIN — Medication 4 MILLILITER(S): at 05:56

## 2022-10-23 RX ADMIN — DORZOLAMIDE HYDROCHLORIDE 1 DROP(S): 20 SOLUTION/ DROPS OPHTHALMIC at 13:15

## 2022-10-23 RX ADMIN — Medication 37.5 MILLIGRAM(S): at 21:48

## 2022-10-23 RX ADMIN — Medication 300 MILLIGRAM(S): at 05:45

## 2022-10-23 RX ADMIN — Medication 300 MILLIGRAM(S): at 17:11

## 2022-10-23 RX ADMIN — Medication 1 DROP(S): at 05:54

## 2022-10-23 RX ADMIN — Medication 4: at 11:30

## 2022-10-23 RX ADMIN — Medication 40 MILLIGRAM(S): at 05:52

## 2022-10-23 RX ADMIN — Medication 1 TABLET(S): at 11:30

## 2022-10-23 RX ADMIN — MAGNESIUM OXIDE 400 MG ORAL TABLET 400 MILLIGRAM(S): 241.3 TABLET ORAL at 05:53

## 2022-10-23 RX ADMIN — MEROPENEM 100 MILLIGRAM(S): 1 INJECTION INTRAVENOUS at 13:15

## 2022-10-23 RX ADMIN — MAGNESIUM OXIDE 400 MG ORAL TABLET 400 MILLIGRAM(S): 241.3 TABLET ORAL at 21:48

## 2022-10-23 RX ADMIN — Medication 3 MILLILITER(S): at 11:05

## 2022-10-23 RX ADMIN — DORZOLAMIDE HYDROCHLORIDE 1 DROP(S): 20 SOLUTION/ DROPS OPHTHALMIC at 05:54

## 2022-10-23 RX ADMIN — Medication 1 DROP(S): at 17:11

## 2022-10-23 RX ADMIN — Medication 1 APPLICATION(S): at 11:07

## 2022-10-23 RX ADMIN — Medication 4: at 17:11

## 2022-10-23 RX ADMIN — MEXILETINE HYDROCHLORIDE 200 MILLIGRAM(S): 150 CAPSULE ORAL at 13:15

## 2022-10-23 RX ADMIN — MEXILETINE HYDROCHLORIDE 200 MILLIGRAM(S): 150 CAPSULE ORAL at 05:53

## 2022-10-23 RX ADMIN — Medication 3 MILLILITER(S): at 05:56

## 2022-10-23 RX ADMIN — APIXABAN 5 MILLIGRAM(S): 2.5 TABLET, FILM COATED ORAL at 17:10

## 2022-10-23 RX ADMIN — Medication 3 MILLILITER(S): at 23:13

## 2022-10-23 RX ADMIN — Medication 5 MILLIGRAM(S): at 21:48

## 2022-10-23 RX ADMIN — FLUCONAZOLE 150 MILLIGRAM(S): 150 TABLET ORAL at 21:46

## 2022-10-23 RX ADMIN — Medication 37.5 MILLIGRAM(S): at 12:28

## 2022-10-23 RX ADMIN — MEROPENEM 100 MILLIGRAM(S): 1 INJECTION INTRAVENOUS at 21:48

## 2022-10-23 RX ADMIN — Medication 37.5 MILLIGRAM(S): at 05:52

## 2022-10-23 RX ADMIN — APIXABAN 5 MILLIGRAM(S): 2.5 TABLET, FILM COATED ORAL at 10:28

## 2022-10-23 RX ADMIN — Medication 8 MILLIGRAM(S): at 05:53

## 2022-10-23 RX ADMIN — Medication 5 MILLIGRAM(S): at 05:52

## 2022-10-23 RX ADMIN — Medication 4 MILLILITER(S): at 17:12

## 2022-10-23 RX ADMIN — Medication 250 MILLIGRAM(S): at 05:50

## 2022-10-23 RX ADMIN — Medication 5 MILLIGRAM(S): at 13:15

## 2022-10-23 RX ADMIN — Medication 12.5 MILLIGRAM(S): at 17:11

## 2022-10-23 RX ADMIN — MEROPENEM 100 MILLIGRAM(S): 1 INJECTION INTRAVENOUS at 05:51

## 2022-10-23 RX ADMIN — MAGNESIUM OXIDE 400 MG ORAL TABLET 400 MILLIGRAM(S): 241.3 TABLET ORAL at 13:14

## 2022-10-23 RX ADMIN — HUMAN INSULIN 12 UNIT(S): 100 INJECTION, SUSPENSION SUBCUTANEOUS at 06:41

## 2022-10-23 RX ADMIN — Medication 12.5 MILLIGRAM(S): at 05:53

## 2022-10-23 RX ADMIN — Medication 0.5 MILLIGRAM(S): at 05:56

## 2022-10-23 RX ADMIN — DORZOLAMIDE HYDROCHLORIDE 1 DROP(S): 20 SOLUTION/ DROPS OPHTHALMIC at 21:47

## 2022-10-23 RX ADMIN — Medication 250 MILLIGRAM(S): at 17:18

## 2022-10-23 NOTE — PHYSICAL THERAPY INITIAL EVALUATION ADULT - PERTINENT HX OF CURRENT PROBLEM, REHAB EVAL
71 year-old female with a history of DM2, CAD, A-Fib on apixaban, Severe Persistent Asthma (on chronic steroids, recently started on Tezspire), colon cancer s/p resection/chemo, and tracheobronchomalacia s/p tracheoplasty, and recent OM of the R foot s/b debridement and completed course of Vancomycin/Ertapenem for MRSA/ESBL Proteus/Corynebacterium who now presents with chest pain, found to have Type A dissection s/p repair with modified Bentall procedure and hemiarch replacement on 9/6/22. Post-op course complicated by acute hypoxemic respiratory failure, shock, anemia, and hyperglycemia requiring insulin gtt. Extubated 9/13
71 year-old female with a history of DM2, CAD, A-Fib on apixaban, Severe Persistent Asthma (on chronic steroids, recently started on Tezspire), colon cancer s/p resection/chemo, and tracheobronchomalacia s/p tracheoplasty, and recent OM of the R foot s/b debridement and completed course of Vancomycin/Ertapenem for MRSA/ESBL Proteus/Corynebacterium who now presents with chest pain, found to have Type A dissection s/p repair with modified Bentall procedure and hemiarch replacement on 9/6/22. Post-op course complicated by acute hypoxemic respiratory failure, shock, anemia, and hyperglycemia requiring insulin gtt. Extubated 9/13; Pt had prolonged hospital course.  Developed Rt elbow eschar;  Pt noted to have fluid over right elbow s/p IR drainage which grew proteus.  Now reaccumulated with fluid. s/p excisional debridement by Plastics

## 2022-10-23 NOTE — PROGRESS NOTE ADULT - ASSESSMENT
69 y/o F w/CAD, AFib, asthma, tracheobronchomalacia s/p tracheoplasty, colon cancer s/p resection and chemo admitted for Type A aortic dissection s/p repair w/hospital course c/b acute hypoxemic respiratory failure now s/p trach, MRSA PNA, and candida glabarata fungemia.    - Tolerating trach collar during the day, wean from vent as tolerated. ABG WNL.   - Chest PT, pulmonary toilet, bronchodilators  - Abx/antifungals as per ID  - Postoperative care as per surgical team   71 y/o F w/CAD, AFib, asthma, tracheobronchomalacia s/p tracheoplasty, colon cancer s/p resection and chemo admitted for Type A aortic dissection s/p repair w/hospital course c/b acute hypoxemic respiratory failure now s/p trach, MRSA PNA, and candida glabarata fungemia.    - Tolerating trach collar ATC for the past 2 days - feels SOB this morning, VSS, exam is WNL. Rec additional bronchodilators. If continues to feel dyspneic may need to return to vent.    - Chest PT, pulmonary toilet, bronchodilators  - Abx/antifungals as per ID  - Postoperative care as per surgical team    d/w nurse at bedside

## 2022-10-23 NOTE — PROGRESS NOTE ADULT - SUBJECTIVE AND OBJECTIVE BOX
Patient seen and examined at the bedside.    Remained critically ill on continuous ICU monitoring.    OBJECTIVE:  Vital Signs Last 24 Hrs  T(C): 36.4 (23 Oct 2022 04:00), Max: 36.7 (22 Oct 2022 08:00)  T(F): 97.6 (23 Oct 2022 04:00), Max: 98.1 (22 Oct 2022 16:00)  HR: 84 (23 Oct 2022 06:00) (76 - 106)  BP: --  BP(mean): --  RR: 28 (23 Oct 2022 06:00) (23 - 50)  SpO2: 97% (23 Oct 2022 06:00) (95% - 100%)    Parameters below as of 23 Oct 2022 06:00  Patient On (Oxygen Delivery Method): tracheostomy collar    O2 Concentration (%): 40      Physical Exam:   General: OOBTC, NAD  Neurology: interactive, able to follow simple commands, denies pain  ENT/Neck: + trach c/d/i, neck supple, trachea midline   Respiratory: coarse BS b/l, rhonchi throughout.   CV: S1S2, no murmurs        [x]Sinus Rhythm   Abdominal: Soft, NT, ND, colostomy in place  Extremities: +4 RUE edema, 3+LUE edema, trace edema noted, + peripheral pulses   Skin: Dry dressing over right heel, R elbow wound w/ overlying cling dressing c/d/i                           Assessment:  71F PMH DM, COPD, chronic adrenal insufficiency on Prednisone, history of colorectal cancer s/p resection (colostomy bag), Hx of CAD, chronic AF on Eliquis, and tracheomalacia s/p multiple intubations, recent dx of R foot OM s/p debridement on antibiotics and presented to ED at [facility] this morning with epigastric pain, belching and central chest pain. Found on CTA to have type A aortic dissection and transferred to [facility] for surgical evaluation by Dr. Cabrera.     Ascending aortic dissection s/p type A dissection repair and Biobentall on 9/7   Respiratory failure s/p Trach 10/10/22  Hypovolemia   Post op respiratory failure   Acute blood loss anemia  Stress hyperglycemia   Leukocytosis   RIJ thrombus  MRSA PNA        Plan:   ***Neuro***  follows simple commands, continue to monitor  PT/OT progress as tolerated, standing exercises     ***Cardiovascular***  Limited TTE on 10/1: EF 69%, Hyperdynamic left ventricular systolic function. There is hypokinesis of the mid-base inferior wall. Nml RV fxn.   CT chest on 9/28: No CT evidence of pulmonary embolism. Status post repair of ascending aortic dissection with a stable dissection flap originating from the aortic arch and extending into the infrarenal abdominal aorta,  B/l UE duplex on 10/5: As before, there is an occluded RIGHT IJ vein with thrombosis extending to brachiocephalic vein. Superficial thrombophlebitis of the LEFT cephalic vein.   Invasive hemodynamic monitoring, assess perfusion indices   SR / MAP 93/ Hct 31.4%/ Lactate 0.9  [x] Hydralazine PO for BP management up-titrated to 37.5 TID  Rate control with Mexiletine and Lopressor   [x] AC Therapy with Heparin increased from 500-> 700 PTT 50-60 (for Afib and IJ thrombus)   Assess for need to transition from Heparin to alternative anticoagulation   Reassessment of hemodynamics    ***Pulmonary***  Respiratory failure s/p Trach #8 portex  10/10/22, per ENT inner cannula needs to be changed daily, trach sutures d/c'ed by ENT 10/17   Post op vent management   Titration of FiO2 and PEEP, follow SpO2, CXR, blood gasses   Bronched 10/13  Continue with Trach Collar trials   Ambu bag and suction as needed for thick secretions, continue mucomyst/duonebs   inhaled tobra added 10/21 for increase secretions, bag and suction prn            ***GI***  [x] Diet:  TFs Glucerna 1.2 @ goal 65ml/hr, tolerating without issues  [x] Protonix    Reglan for gut motility      ***Renal***  Continue to monitor I/Os, BUN/Creatinine.   Replete lytes PRN  Diuresis with Lasix 40 IV BID      ***ID***  SCx on 10/4+Methicillin resistant Staphylococcus aureus, c/w IVPB Vancomycin, (plan for 6 week course in setting of valve surgery, 11/26end date)  9/27 blood culture Neela Glabarata, on flucanazole (lifelong suppression)  R elbow wound, S/p IR for elbow drainage 10/13 + Few Proteus mirabilis ESBL, Meropenem 7 day trial completed 10/19-> reordered 10/20 for reaccumulation of elbow brusitis, ortho reconsulted and plastic surgery for ? drainage vs debridement  BCx 10/13 NGTD, BC 10/21 and SC pending  C/w Diflucan   10/22 plastics debrided R elbow eschar to subc tissue, ~8cc fluid aspirated and sent for culture. Wet to dry dressing changed 2x daily and plan for PT to place vac on Monday 10/24.      ***Endocrine***  [x]  DM : HbA1c 9.4%                - [x] ISS  [x] NPH             - Need tight glycemic control to prevent wound infection.            Patient requires continuous monitoring with bedside rhythm monitoring, pulse oximetry monitoring, and continuous central venous and arterial pressure monitoring; and intermittent blood gas analysis. Care plan discussed with the ICU care team.   Patient remained critical, at risk for life threatening decompensation.    I have spent 30 minutes providing critical care management to this patient.    By signing my name below, I, Maria D'Amico, attest that this documentation has been prepared under the direction and in the presence of Radha Nichols NP   Electronically signed: Maria D'Amico, Scribe, 10-23-22 @ 07:57    I, Radha Nichols NP , personally performed the services described in this documentation. all medical record entries made by the rogers were at my direction and in my presence. I have reviewed the chart and agree that the record reflects my personal performance and is accurate and complete  Electronically signed: Radha Nichols NP  Patient seen and examined at the bedside.    Remained critically ill on continuous ICU monitoring.    OBJECTIVE:  Vital Signs Last 24 Hrs  T(C): 36.4 (23 Oct 2022 04:00), Max: 36.7 (22 Oct 2022 08:00)  T(F): 97.6 (23 Oct 2022 04:00), Max: 98.1 (22 Oct 2022 16:00)  HR: 84 (23 Oct 2022 06:00) (76 - 106)  BP: --  BP(mean): --  RR: 28 (23 Oct 2022 06:00) (23 - 50)  SpO2: 97% (23 Oct 2022 06:00) (95% - 100%)    Parameters below as of 23 Oct 2022 06:00  Patient On (Oxygen Delivery Method): tracheostomy collar    O2 Concentration (%): 40      Physical Exam:   General: OOBTC, NAD  Neurology: interactive, able to follow simple commands, denies pain  ENT/Neck: + trach c/d/i, neck supple, trachea midline   Respiratory: coarse BS b/l, rhonchi throughout.   CV: S1S2, no murmurs        [x]Sinus Rhythm   Abdominal: Soft, NT, ND, colostomy in place  Extremities: +4 RUE edema, 3+LUE edema, trace edema noted, + peripheral pulses   Skin: Dry dressing over right heel, R elbow wound w/ overlying cling dressing c/d/i                           Assessment:  71F PMH DM, COPD, chronic adrenal insufficiency on Prednisone, history of colorectal cancer s/p resection (colostomy bag), Hx of CAD, chronic AF on Eliquis, and tracheomalacia s/p multiple intubations, recent dx of R foot OM s/p debridement on antibiotics and presented to ED at [facility] this morning with epigastric pain, belching and central chest pain. Found on CTA to have type A aortic dissection and transferred to [facility] for surgical evaluation by Dr. Cabrera.     Ascending aortic dissection s/p type A dissection repair and Biobentall on 9/7   Respiratory failure s/p Trach 10/10/22  Hypovolemia   Post op respiratory failure   Acute blood loss anemia  Stress hyperglycemia   Leukocytosis   RIJ thrombus  MRSA PNA        Plan:   ***Neuro***  follows simple commands, continue to monitor  PT/OT progress as tolerated, standing exercises     ***Cardiovascular***  Limited TTE on 10/1: EF 69%, Hyperdynamic left ventricular systolic function. There is hypokinesis of the mid-base inferior wall. Nml RV fxn.   CT chest on 9/28: No CT evidence of pulmonary embolism. Status post repair of ascending aortic dissection with a stable dissection flap originating from the aortic arch and extending into the infrarenal abdominal aorta,  B/l UE duplex on 10/5: As before, there is an occluded RIGHT IJ vein with thrombosis extending to brachiocephalic vein. Superficial thrombophlebitis of the LEFT cephalic vein.   Invasive hemodynamic monitoring, assess perfusion indices   SR / MAP 93/ Hct 31.4%/ Lactate 0.9  [x] Hydralazine PO for BP management up-titrated to 37.5 TID  Rate control with Mexiletine and Lopressor   [x] AC Therapy with Heparin increased from 500-> 700 PTT 50-60 (for Afib and IJ thrombus)  Started Eliquis 5 BID   Assess for need to transition from Heparin to alternative anticoagulation   Reassessment of hemodynamics    ***Pulmonary***  Respiratory failure s/p Trach #8 portex  10/10/22, per ENT inner cannula needs to be changed daily, trach sutures d/c'ed by ENT 10/17   Post op vent management   Titration of FiO2 and PEEP, follow SpO2, CXR, blood gasses   Bronched 10/13  Continue with Trach Collar trials   Ambu bag and suction as needed for thick secretions, continue mucomyst/duonebs   inhaled tobra added 10/21 for increase secretions, bag and suction prn            ***GI***  [x] Diet:  TFs Glucerna 1.2 @ goal 65ml/hr, tolerating without issues  [x] Protonix    Reglan for gut motility      ***Renal***  Continue to monitor I/Os, BUN/Creatinine.   Replete lytes PRN  Diuresis with Lasix 40 IV BID      ***ID***  SCx on 10/4+Methicillin resistant Staphylococcus aureus, c/w IVPB Vancomycin, (plan for 6 week course in setting of valve surgery, 11/26end date)  9/27 blood culture Neela Glabarata, on flucanazole (lifelong suppression)  R elbow wound, S/p IR for elbow drainage 10/13 + Few Proteus mirabilis ESBL, Meropenem 7 day trial completed 10/19-> reordered 10/20 for reaccumulation of elbow brusitis, ortho reconsulted and plastic surgery for ? drainage vs debridement  BCx 10/13 NGTD, BC 10/21 and SCx NGTD  C/w Diflucan   10/22 plastics debrided R elbow eschar to subc tissue, ~8cc fluid aspirated and sent for culture. Wet to dry dressing changed 2x daily and plan for PT to place vac on Monday 10/24.  Joint Fluid Cx 10/22 +Moderate polymorphonuclear leukocytes      ***Endocrine***  [x]  DM : HbA1c 9.4%                - [x] ISS  [x] NPH             - Need tight glycemic control to prevent wound infection.            Patient requires continuous monitoring with bedside rhythm monitoring, pulse oximetry monitoring, and continuous central venous and arterial pressure monitoring; and intermittent blood gas analysis. Care plan discussed with the ICU care team.   Patient remained critical, at risk for life threatening decompensation.    I have spent 30 minutes providing critical care management to this patient.    By signing my name below, I, Maria D'Amico, attest that this documentation has been prepared under the direction and in the presence of Radha Nichols NP   Electronically signed: Maria D'Amico, Scribe, 10-23-22 @ 07:57    I, Radha Nichols NP , personally performed the services described in this documentation. all medical record entries made by the rogers were at my direction and in my presence. I have reviewed the chart and agree that the record reflects my personal performance and is accurate and complete  Electronically signed: Radha Nichols NP  Patient seen and examined at the bedside.    Remained critically ill on continuous ICU monitoring.    OBJECTIVE:  Vital Signs Last 24 Hrs  T(C): 36.4 (23 Oct 2022 04:00), Max: 36.7 (22 Oct 2022 08:00)  T(F): 97.6 (23 Oct 2022 04:00), Max: 98.1 (22 Oct 2022 16:00)  HR: 84 (23 Oct 2022 06:00) (76 - 106)  BP: --  BP(mean): --  RR: 28 (23 Oct 2022 06:00) (23 - 50)  SpO2: 97% (23 Oct 2022 06:00) (95% - 100%)    Parameters below as of 23 Oct 2022 06:00  Patient On (Oxygen Delivery Method): tracheostomy collar    O2 Concentration (%): 40      Physical Exam:   General: OOBTC, NAD  Neurology: interactive, able to follow simple commands, denies pain  ENT/Neck: + trach c/d/i, neck supple, trachea midline   Respiratory: coarse BS b/l, rhonchi throughout.   CV: S1S2, no murmurs        [x]Sinus Rhythm   Abdominal: Soft, NT, ND, colostomy in place  Extremities: +4 RUE edema, 3+LUE edema, trace edema noted, + peripheral pulses   Skin: Dry dressing over right heel, R elbow wound w/ overlying cling dressing c/d/i                           Assessment:  71F PMH DM, COPD, chronic adrenal insufficiency on Prednisone, history of colorectal cancer s/p resection (colostomy bag), Hx of CAD, chronic AF on Eliquis, and tracheomalacia s/p multiple intubations, recent dx of R foot OM s/p debridement on antibiotics and presented to ED at [facility] this morning with epigastric pain, belching and central chest pain. Found on CTA to have type A aortic dissection and transferred to [facility] for surgical evaluation by Dr. Cabrera.     Ascending aortic dissection s/p type A dissection repair and Biobentall on 9/7   Respiratory failure s/p Trach 10/10/22  Hypovolemia   Post op respiratory failure   Acute blood loss anemia  Stress hyperglycemia   Leukocytosis   RIJ thrombus  MRSA PNA        Plan:   ***Neuro***  follows simple commands, continue to monitor  PT/OT progress as tolerated, standing exercises     ***Cardiovascular***  Limited TTE on 10/1: EF 69%, Hyperdynamic left ventricular systolic function. There is hypokinesis of the mid-base inferior wall. Nml RV fxn.   CT chest on 9/28: No CT evidence of pulmonary embolism. Status post repair of ascending aortic dissection with a stable dissection flap originating from the aortic arch and extending into the infrarenal abdominal aorta,  B/l UE duplex on 10/5: As before, there is an occluded RIGHT IJ vein with thrombosis extending to brachiocephalic vein. Superficial thrombophlebitis of the LEFT cephalic vein.   Invasive hemodynamic monitoring, assess perfusion indices   SR / MAP 93/ Hct 31.4%/ Lactate 0.9  [x] Hydralazine PO for BP management up-titrated to 37.5 TID  Rate control with Mexiletine and Lopressor   [x] AC Therapy changed from heparin gtt to Eliquis 5 BID for Afib and IJ thrombus  Reassessment of hemodynamics    ***Pulmonary***  Respiratory failure s/p Trach #8 portex  10/10/22, per ENT inner cannula needs to be changed daily, trach sutures d/c'ed by ENT 10/17   Post op vent management   Titration of FiO2 and PEEP, follow SpO2, CXR, blood gasses   Bronched 10/13  Continue with Trach Collar trials   Ambu bag and suction as needed for thick secretions, continue mucomyst/duonebs  inhaled tobra added 10/21 for increase secretions, bag and suction prn            ***GI***  [x] Diet:  TFs Glucerna 1.2 @ goal 65ml/hr, tolerating without issues  [x] Protonix    Reglan for gut motility      ***Renal***  Continue to monitor I/Os, BUN/Creatinine.   Replete lytes PRN  Diuresis with Lasix 40 IV BID, changed to PO, pt euvolemic     ***ID***  SCx on 10/4+Methicillin resistant Staphylococcus aureus, c/w IVPB Vancomycin, (plan for 6 week course in setting of valve surgery, 11/26end date)  9/27 blood culture Neela Glabarata, on flucanazole (lifelong suppression)  R elbow wound, S/p IR for elbow drainage 10/13 + Few Proteus mirabilis ESBL, Meropenem 7 day trial completed 10/19-> reordered 10/20 for reaccumulation of elbow brusitis, ortho reconsulted and plastic surgery for ? drainage vs debridement  BCx 10/13 NGTD, BC 10/21 and SCx NGTD  C/w Diflucan   10/22 plastics debrided R elbow eschar to subc tissue, ~8cc fluid aspirated and sent for culture. Wet to dry dressing changed 2x daily and plan for PT to place vac on Monday 10/24.  Joint Fluid Cx 10/22 +Moderate polymorphonuclear leukocytes, no organisims      ***Endocrine***  [x]  DM : HbA1c 9.4%                - [x] ISS  [x] NPH             - Need tight glycemic control to prevent wound infection.            Patient requires continuous monitoring with bedside rhythm monitoring, pulse oximetry monitoring, and continuous central venous and arterial pressure monitoring; and intermittent blood gas analysis. Care plan discussed with the ICU care team.   Patient remained critical, at risk for life threatening decompensation.    I have spent 35 minutes providing critical care management to this patient.    By signing my name below, I, Maria D'Amico, attest that this documentation has been prepared under the direction and in the presence of Radha Nichols NP   Electronically signed: Maria D'Amico, Scribe, 10-23-22 @ 07:57    I, Radha Nichols NP , personally performed the services described in this documentation. all medical record entries made by the rogers were at my direction and in my presence. I have reviewed the chart and agree that the record reflects my personal performance and is accurate and complete  Electronically signed: Radha Nichols NP

## 2022-10-23 NOTE — PHYSICAL THERAPY INITIAL EVALUATION ADULT - MD ORDER
VAC consult to recently debrided elbow wound starting Monday 10/24 per Plastics VAC consult to recently debrided elbow wound starting Monday 10/24 per Plastics. VAC APPLIED 10/24

## 2022-10-23 NOTE — PHYSICAL THERAPY INITIAL EVALUATION ADULT - MODALITIES TREATMENT COMMENTS
pt nikhil VAC application well. VAC requested by plastics for R elbow, s/p debridement of eschar. pt rec'd in chair, ICU monitoring, trach, NGT, NAD, agreed to dressing change. dressing rec'd C/D/I, no s/s of infection. wound w/ no signs of granulation, minimal slough, pink soft tissue covering elbow. VAC dressing applied w/ adaptic and black foam, tracked to dorsal forearm w/ good collapsed seal at 125mmHg continuos, dressing gently wrapped w/ ace wrap in spiral pattern for support. Pt left in chair as found, RN aware, NAD, all lines intact, cb in reach, all needs met/5Ps. ACP Gena updated and Plastics MD Brown updated.

## 2022-10-23 NOTE — PHYSICAL THERAPY INITIAL EVALUATION ADULT - ADDITIONAL COMMENTS
Pt lives w/ sister ([place] pt) in a pvt house w/ 14 steps to enter. PTA amb indep w/o a device. Pt recently from [facility] w/ plans to return then to stay a dtr's home for short period.
Pt lives w/ sister ([place] pt) in a pvt house w/ 14 steps to enter. PTA amb indep w/o a device. Pt recently from [facility] w/ plans to return then to stay a dtr's home for short period.

## 2022-10-23 NOTE — PROGRESS NOTE ADULT - SUBJECTIVE AND OBJECTIVE BOX
[facility] - Division of Pulmonary, Critical Care and Sleep Medicine   Please call [phone] between 8am-pm weekdays, [phone] after hours and weekends    Interval Events:     REVIEW OF SYSTEMS:  CV: [ ] chest pain   Resp: [ ] cough [ ] shortness of breath   [ ] All other systems negative  [ ] Unable to assess ROS because ________    OBJECTIVE:  ICU Vital Signs Last 24 Hrs  T(C): 36.3 (23 Oct 2022 08:00), Max: 36.7 (22 Oct 2022 12:00)  T(F): 97.4 (23 Oct 2022 08:00), Max: 98.1 (22 Oct 2022 16:00)  HR: 75 (23 Oct 2022 08:00) (70 - 106)  BP: --  BP(mean): --  ABP: 146/56 (23 Oct 2022 08:00) (127/44 - 175/68)  ABP(mean): 87 (23 Oct 2022 08:00) (70 - 105)  RR: 27 (23 Oct 2022 08:00) (23 - 50)  SpO2: 97% (23 Oct 2022 08:00) (95% - 100%)    O2 Parameters below as of 23 Oct 2022 08:00  Patient On (Oxygen Delivery Method): tracheostomy collar    O2 Concentration (%): 40          10-22 @ 07:01  -  10-23 @ 07:00  --------------------------------------------------------  IN: 1920 mL / OUT: 2650 mL / NET: -730 mL    10-23 @ 07:01  -  10-23 @ 08:56  --------------------------------------------------------  IN: 74 mL / OUT: 0 mL / NET: 74 mL      CAPILLARY BLOOD GLUCOSE      POCT Blood Glucose.: 78 mg/dL (23 Oct 2022 05:49)      PHYSICAL EXAM:  General: NAD  HEENT: NC/AT  Lymph Nodes: no cervical or supraclavicular lymphadenopathy  Neck: supple  Respiratory:  CTA b/l, no wheezes, crackles or rhonchi  Cardiovascular:  RRR, no m/r/g  Abdomen: soft, NT/ND, +BS  Extremities: no clubbing, cyanosis or edema, warm  Skin: no rash  Neurological: AAOx3, non focal exam  Psychiatry: not anxious appearing, normal affect and mood    HOSPITAL MEDICATIONS:  MEDICATIONS  (STANDING):  acetylcysteine 10%  Inhalation 4 milliLiter(s) Inhalation every 12 hours  albuterol/ipratropium for Nebulization 3 milliLiter(s) Nebulizer every 6 hours  apixaban 2.5 milliGRAM(s) Oral every 12 hours  buDESOnide    Inhalation Suspension 0.5 milliGRAM(s) Inhalation every 12 hours  chlorhexidine 2% Cloths 1 Application(s) Topical <User Schedule>  collagenase Ointment 1 Application(s) Topical daily  dextrose 50% Injectable 50 milliLiter(s) IV Push every 15 minutes  dorzolamide 2% Ophthalmic Solution 1 Drop(s) Left EYE three times a day  fluconAZOLE IVPB 600 milliGRAM(s) IV Intermittent every 24 hours  furosemide   Injectable 40 milliGRAM(s) IV Push two times a day  hydrALAZINE 37.5 milliGRAM(s) Oral every 8 hours  insulin lispro (ADMELOG) corrective regimen sliding scale   SubCutaneous every 6 hours  insulin NPH human recombinant 12 Unit(s) SubCutaneous every 6 hours  magnesium oxide 400 milliGRAM(s) Oral every 8 hours  meropenem  IVPB 1000 milliGRAM(s) IV Intermittent every 8 hours  methylPREDNISolone 8 milliGRAM(s) Oral daily  metoclopramide Injectable 5 milliGRAM(s) IV Push every 8 hours  metoprolol tartrate 12.5 milliGRAM(s) Enteral Tube every 12 hours  mexiletine 200 milliGRAM(s) Oral every 8 hours  multivitamin 1 Tablet(s) Oral daily  pantoprazole  Injectable 40 milliGRAM(s) IV Push daily  sodium chloride 0.9%. 1000 milliLiter(s) (10 mL/Hr) IV Continuous <Continuous>  timolol 0.5% Solution 1 Drop(s) Left EYE two times a day  tobramycin for Nebulization 300 milliGRAM(s) Inhalation every 12 hours  vancomycin  IVPB 750 milliGRAM(s) IV Intermittent every 12 hours  vancomycin  IVPB        MEDICATIONS  (PRN):  acetaminophen    Suspension .. 650 milliGRAM(s) Oral every 6 hours PRN Temp greater or equal to 38C (100.4F), Mild Pain (1 - 3)      LABS:                        9.1    14.44 )-----------( 335      ( 23 Oct 2022 00:41 )             31.4     Hgb Trend: 9.1<--, 8.5<--, 10.9<--, 10.4<--, 10.3<--  10-23    144  |  99  |  24<H>  ----------------------------<  188<H>  4.0   |  33<H>  |  0.49<L>    Ca    10.0      23 Oct 2022 00:40  Phos  4.6     10-22  Mg     3.0     10-22    TPro  7.5  /  Alb  3.5  /  TBili  0.3  /  DBili  x   /  AST  22  /  ALT  23  /  AlkPhos  126<H>  10-23    Creatinine Trend: 0.49<--, 0.80<--, 0.55<--, 0.41<--, 0.43<--, 0.40<--  PTT - ( 23 Oct 2022 00:40 )  PTT:57.6 sec    Arterial Blood Gas:  10-22 @ 00:22  7.44/38/122/26/98.4/1.6  ABG lactate: --  Arterial Blood Gas:  10-21 @ 17:45  7.43/49/105/32/97.7/7.3  ABG lactate: --  Arterial Blood Gas:  10-21 @ 15:00  7.42/49/87/32/97.1/6.5  ABG lactate: --  Arterial Blood Gas:  10-21 @ 09:20  7.54/40/78/34/96.5/10.7  ABG lactate: --        MICROBIOLOGY:     RADIOLOGY:  [ ] Reviewed and interpreted by me   [facility] - Division of Pulmonary, Critical Care and Sleep Medicine   Please call [phone] between 8am-pm weekdays, [phone] after hours and weekends    Interval Events:  no events overnight, remains on TC x 48 hours.    REVIEW OF SYSTEMS:  CV: [ ] chest pain   Resp: [ ] cough [x ] shortness of breath   [x ] All other systems negative  [ ] Unable to assess ROS because ________    OBJECTIVE:  ICU Vital Signs Last 24 Hrs  T(C): 36.3 (23 Oct 2022 08:00), Max: 36.7 (22 Oct 2022 12:00)  T(F): 97.4 (23 Oct 2022 08:00), Max: 98.1 (22 Oct 2022 16:00)  HR: 75 (23 Oct 2022 08:00) (70 - 106)  BP: --  BP(mean): --  ABP: 146/56 (23 Oct 2022 08:00) (127/44 - 175/68)  ABP(mean): 87 (23 Oct 2022 08:00) (70 - 105)  RR: 27 (23 Oct 2022 08:00) (23 - 50)  SpO2: 97% (23 Oct 2022 08:00) (95% - 100%)    O2 Parameters below as of 23 Oct 2022 08:00  Patient On (Oxygen Delivery Method): tracheostomy collar    O2 Concentration (%): 40          10-22 @ 07:01  -  10-23 @ 07:00  --------------------------------------------------------  IN: 1920 mL / OUT: 2650 mL / NET: -730 mL    10-23 @ 07:01  -  10-23 @ 08:56  --------------------------------------------------------  IN: 74 mL / OUT: 0 mL / NET: 74 mL      CAPILLARY BLOOD GLUCOSE      POCT Blood Glucose.: 78 mg/dL (23 Oct 2022 05:49)      PHYSICAL EXAM:  General: NAD  HEENT: NC/AT  Lymph Nodes: no cervical or supraclavicular lymphadenopathy  Neck: supple  Respiratory:  CTA b/l, no wheezes, crackles or rhonchi  Cardiovascular:  RRR, no m/r/g  Abdomen: soft, NT/ND, +BS  Extremities: no clubbing, cyanosis or edema, warm  Skin: no rash  Neurological: AAOx3, non focal exam  Psychiatry: not anxious appearing, normal affect and mood    HOSPITAL MEDICATIONS:  MEDICATIONS  (STANDING):  acetylcysteine 10%  Inhalation 4 milliLiter(s) Inhalation every 12 hours  albuterol/ipratropium for Nebulization 3 milliLiter(s) Nebulizer every 6 hours  apixaban 2.5 milliGRAM(s) Oral every 12 hours  buDESOnide    Inhalation Suspension 0.5 milliGRAM(s) Inhalation every 12 hours  chlorhexidine 2% Cloths 1 Application(s) Topical <User Schedule>  collagenase Ointment 1 Application(s) Topical daily  dextrose 50% Injectable 50 milliLiter(s) IV Push every 15 minutes  dorzolamide 2% Ophthalmic Solution 1 Drop(s) Left EYE three times a day  fluconAZOLE IVPB 600 milliGRAM(s) IV Intermittent every 24 hours  furosemide   Injectable 40 milliGRAM(s) IV Push two times a day  hydrALAZINE 37.5 milliGRAM(s) Oral every 8 hours  insulin lispro (ADMELOG) corrective regimen sliding scale   SubCutaneous every 6 hours  insulin NPH human recombinant 12 Unit(s) SubCutaneous every 6 hours  magnesium oxide 400 milliGRAM(s) Oral every 8 hours  meropenem  IVPB 1000 milliGRAM(s) IV Intermittent every 8 hours  methylPREDNISolone 8 milliGRAM(s) Oral daily  metoclopramide Injectable 5 milliGRAM(s) IV Push every 8 hours  metoprolol tartrate 12.5 milliGRAM(s) Enteral Tube every 12 hours  mexiletine 200 milliGRAM(s) Oral every 8 hours  multivitamin 1 Tablet(s) Oral daily  pantoprazole  Injectable 40 milliGRAM(s) IV Push daily  sodium chloride 0.9%. 1000 milliLiter(s) (10 mL/Hr) IV Continuous <Continuous>  timolol 0.5% Solution 1 Drop(s) Left EYE two times a day  tobramycin for Nebulization 300 milliGRAM(s) Inhalation every 12 hours  vancomycin  IVPB 750 milliGRAM(s) IV Intermittent every 12 hours  vancomycin  IVPB        MEDICATIONS  (PRN):  acetaminophen    Suspension .. 650 milliGRAM(s) Oral every 6 hours PRN Temp greater or equal to 38C (100.4F), Mild Pain (1 - 3)      LABS:                        9.1    14.44 )-----------( 335      ( 23 Oct 2022 00:41 )             31.4     Hgb Trend: 9.1<--, 8.5<--, 10.9<--, 10.4<--, 10.3<--  10-23    144  |  99  |  24<H>  ----------------------------<  188<H>  4.0   |  33<H>  |  0.49<L>    Ca    10.0      23 Oct 2022 00:40  Phos  4.6     10-22  Mg     3.0     10-22    TPro  7.5  /  Alb  3.5  /  TBili  0.3  /  DBili  x   /  AST  22  /  ALT  23  /  AlkPhos  126<H>  10-23    Creatinine Trend: 0.49<--, 0.80<--, 0.55<--, 0.41<--, 0.43<--, 0.40<--  PTT - ( 23 Oct 2022 00:40 )  PTT:57.6 sec    Arterial Blood Gas:  10-22 @ 00:22  7.44/38/122/26/98.4/1.6  ABG lactate: --  Arterial Blood Gas:  10-21 @ 17:45  7.43/49/105/32/97.7/7.3  ABG lactate: --  Arterial Blood Gas:  10-21 @ 15:00  7.42/49/87/32/97.1/6.5  ABG lactate: --  Arterial Blood Gas:  10-21 @ 09:20  7.54/40/78/34/96.5/10.7  ABG lactate: --        MICROBIOLOGY:     RADIOLOGY:  [ ] Reviewed and interpreted by me

## 2022-10-23 NOTE — PROGRESS NOTE ADULT - SUBJECTIVE AND OBJECTIVE BOX
Plastic Surgery Progress Note (pg [facility]: [id number], [facility]: [phone])    SUBJECTIVE  Pt comfortable in bed. Pain well controlled, no complaints.    OBJECTIVE  ___________________________________________________  VITAL SIGNS / I&O's   Vital Signs Last 24 Hrs  T(C): 36.3 (23 Oct 2022 08:00), Max: 36.7 (22 Oct 2022 12:00)  T(F): 97.4 (23 Oct 2022 08:00), Max: 98.1 (22 Oct 2022 16:00)  HR: 75 (23 Oct 2022 08:00) (70 - 106)  BP: --  BP(mean): --  RR: 27 (23 Oct 2022 08:00) (23 - 50)  SpO2: 97% (23 Oct 2022 08:00) (95% - 100%)    Parameters below as of 23 Oct 2022 08:00  Patient On (Oxygen Delivery Method): tracheostomy collar    O2 Concentration (%): 40      22 Oct 2022 07:01  -  23 Oct 2022 07:00  --------------------------------------------------------  IN:    Glucerna 1.5: 1560 mL    Heparin: 160 mL    IV PiggyBack: 50 mL    sodium chloride 0.9%: 150 mL  Total IN: 1920 mL    OUT:    Colostomy (mL): 300 mL    Voided (mL): 2350 mL  Total OUT: 2650 mL    Total NET: -730 mL      23 Oct 2022 07:01  -  23 Oct 2022 08:25  --------------------------------------------------------  IN:    Glucerna 1.5: 65 mL    Heparin: 9 mL  Total IN: 74 mL    OUT:  Total OUT: 0 mL    Total NET: 74 mL        ___________________________________________________  PHYSICAL EXAM    General: NAD  Ext: R elbow WTD dressing c/d/i. Wound base clean, no pus or discharge. Early granulation tissue with fibrinous edges    ___________________________________________________  LABS                        9.1    14.44 )-----------( 335      ( 23 Oct 2022 00:41 )             31.4     23 Oct 2022 00:40    144    |  99     |  24     ----------------------------<  188    4.0     |  33     |  0.49     Ca    10.0       23 Oct 2022 00:40  Phos  4.6       22 Oct 2022 00:53  Mg     3.0       22 Oct 2022 00:53    TPro  7.5    /  Alb  3.5    /  TBili  0.3    /  DBili  x      /  AST  22     /  ALT  23     /  AlkPhos  126    23 Oct 2022 00:40    PTT - ( 23 Oct 2022 00:40 )  PTT:57.6 sec  CAPILLARY BLOOD GLUCOSE      POCT Blood Glucose.: 78 mg/dL (23 Oct 2022 05:49)  POCT Blood Glucose.: 196 mg/dL (22 Oct 2022 23:34)  POCT Blood Glucose.: 211 mg/dL (22 Oct 2022 18:23)  POCT Blood Glucose.: 318 mg/dL (22 Oct 2022 13:06)    ___________________________________________________  MICRO  Recent Cultures:  Specimen Source: Joint Fl Arm - Right, 10-22 @ 14:18; Results --; Gram Stain:   Moderate polymorphonuclear leukocytes per low power field  No organisms seen; Organism: --  Specimen Source: .Sputum Sputum trap, 10-21 @ 10:04; Results   Normal Respiratory Jessica present; Gram Stain:   Moderate polymorphonuclear leukocytes per low power field  Few Squamous epithelial cells per low power field  Rare Gram positive cocci in pairs per oil power field; Organism: --  Specimen Source: .Blood Blood-Peripheral, 10-21 @ 05:05; Results   No growth to date.; Gram Stain: --; Organism: --  Specimen Source: .Blood Blood, 10-21 @ 05:04; Results   No growth to date.; Gram Stain: --; Organism: --    ___________________________________________________  MEDICATIONS  (STANDING):  acetylcysteine 10%  Inhalation 4 milliLiter(s) Inhalation every 12 hours  albuterol/ipratropium for Nebulization 3 milliLiter(s) Nebulizer every 6 hours  apixaban 2.5 milliGRAM(s) Oral every 12 hours  buDESOnide    Inhalation Suspension 0.5 milliGRAM(s) Inhalation every 12 hours  chlorhexidine 2% Cloths 1 Application(s) Topical <User Schedule>  collagenase Ointment 1 Application(s) Topical daily  dextrose 50% Injectable 50 milliLiter(s) IV Push every 15 minutes  dorzolamide 2% Ophthalmic Solution 1 Drop(s) Left EYE three times a day  fluconAZOLE IVPB 600 milliGRAM(s) IV Intermittent every 24 hours  furosemide   Injectable 40 milliGRAM(s) IV Push two times a day  hydrALAZINE 37.5 milliGRAM(s) Oral every 8 hours  insulin lispro (ADMELOG) corrective regimen sliding scale   SubCutaneous every 6 hours  insulin NPH human recombinant 12 Unit(s) SubCutaneous every 6 hours  magnesium oxide 400 milliGRAM(s) Oral every 8 hours  meropenem  IVPB 1000 milliGRAM(s) IV Intermittent every 8 hours  methylPREDNISolone 8 milliGRAM(s) Oral daily  metoclopramide Injectable 5 milliGRAM(s) IV Push every 8 hours  metoprolol tartrate 12.5 milliGRAM(s) Enteral Tube every 12 hours  mexiletine 200 milliGRAM(s) Oral every 8 hours  multivitamin 1 Tablet(s) Oral daily  pantoprazole  Injectable 40 milliGRAM(s) IV Push daily  sodium chloride 0.9%. 1000 milliLiter(s) (10 mL/Hr) IV Continuous <Continuous>  timolol 0.5% Solution 1 Drop(s) Left EYE two times a day  tobramycin for Nebulization 300 milliGRAM(s) Inhalation every 12 hours  vancomycin  IVPB 750 milliGRAM(s) IV Intermittent every 12 hours  vancomycin  IVPB        MEDICATIONS  (PRN):  acetaminophen    Suspension .. 650 milliGRAM(s) Oral every 6 hours PRN Temp greater or equal to 38C (100.4F), Mild Pain (1 - 3)

## 2022-10-23 NOTE — PROGRESS NOTE ADULT - ASSESSMENT
ASSESSMENT/PLAN:   RAYRAY RODRIGUEZ is a 72y Female s/p R elbow eschar debridement 10/22    - Continue WTD dressing for now  - May need wound vac tomorrow  - Appreciate excellent care per primary team  - Will discuss with Dr. Melgar    Plastic Surgery   [facility]: [phone]

## 2022-10-24 LAB
-  AMIKACIN: SIGNIFICANT CHANGE UP
-  AMOXICILLIN/CLAVULANIC ACID: SIGNIFICANT CHANGE UP
-  AMPICILLIN/SULBACTAM: SIGNIFICANT CHANGE UP
-  AMPICILLIN: SIGNIFICANT CHANGE UP
-  AZTREONAM: SIGNIFICANT CHANGE UP
-  CEFAZOLIN: SIGNIFICANT CHANGE UP
-  CEFEPIME: SIGNIFICANT CHANGE UP
-  CEFTRIAXONE: SIGNIFICANT CHANGE UP
-  CIPROFLOXACIN: SIGNIFICANT CHANGE UP
-  ERTAPENEM: SIGNIFICANT CHANGE UP
-  GENTAMICIN: SIGNIFICANT CHANGE UP
-  LEVOFLOXACIN: SIGNIFICANT CHANGE UP
-  MEROPENEM: SIGNIFICANT CHANGE UP
-  PIPERACILLIN/TAZOBACTAM: SIGNIFICANT CHANGE UP
-  TOBRAMYCIN: SIGNIFICANT CHANGE UP
-  TRIMETHOPRIM/SULFAMETHOXAZOLE: SIGNIFICANT CHANGE UP
ALBUMIN SERPL ELPH-MCNC: 3.2 G/DL — LOW (ref 3.3–5)
ALP SERPL-CCNC: 115 U/L — SIGNIFICANT CHANGE UP (ref 40–120)
ALT FLD-CCNC: 20 U/L — SIGNIFICANT CHANGE UP (ref 10–45)
ANION GAP SERPL CALC-SCNC: 10 MMOL/L — SIGNIFICANT CHANGE UP (ref 5–17)
AST SERPL-CCNC: 18 U/L — SIGNIFICANT CHANGE UP (ref 10–40)
BILIRUB SERPL-MCNC: 0.2 MG/DL — SIGNIFICANT CHANGE UP (ref 0.2–1.2)
BUN SERPL-MCNC: 23 MG/DL — SIGNIFICANT CHANGE UP (ref 7–23)
CALCIUM SERPL-MCNC: 9.8 MG/DL — SIGNIFICANT CHANGE UP (ref 8.4–10.5)
CHLORIDE SERPL-SCNC: 103 MMOL/L — SIGNIFICANT CHANGE UP (ref 96–108)
CO2 SERPL-SCNC: 32 MMOL/L — HIGH (ref 22–31)
CREAT SERPL-MCNC: 0.49 MG/DL — LOW (ref 0.5–1.3)
EGFR: 99 ML/MIN/1.73M2 — SIGNIFICANT CHANGE UP
GAS PNL BLDA: SIGNIFICANT CHANGE UP
GLUCOSE BLDC GLUCOMTR-MCNC: 195 MG/DL — HIGH (ref 70–99)
GLUCOSE BLDC GLUCOMTR-MCNC: 231 MG/DL — HIGH (ref 70–99)
GLUCOSE BLDC GLUCOMTR-MCNC: 83 MG/DL — SIGNIFICANT CHANGE UP (ref 70–99)
GLUCOSE SERPL-MCNC: 81 MG/DL — SIGNIFICANT CHANGE UP (ref 70–99)
HCT VFR BLD CALC: 30.4 % — LOW (ref 34.5–45)
HGB BLD-MCNC: 8.7 G/DL — LOW (ref 11.5–15.5)
MAGNESIUM SERPL-MCNC: 1.8 MG/DL — SIGNIFICANT CHANGE UP (ref 1.6–2.6)
MCHC RBC-ENTMCNC: 23.1 PG — LOW (ref 27–34)
MCHC RBC-ENTMCNC: 28.6 GM/DL — LOW (ref 32–36)
MCV RBC AUTO: 80.6 FL — SIGNIFICANT CHANGE UP (ref 80–100)
METHOD TYPE: SIGNIFICANT CHANGE UP
NRBC # BLD: 0 /100 WBCS — SIGNIFICANT CHANGE UP (ref 0–0)
PHOSPHATE SERPL-MCNC: 2.9 MG/DL — SIGNIFICANT CHANGE UP (ref 2.5–4.5)
PLATELET # BLD AUTO: 317 K/UL — SIGNIFICANT CHANGE UP (ref 150–400)
POTASSIUM SERPL-MCNC: 3.9 MMOL/L — SIGNIFICANT CHANGE UP (ref 3.5–5.3)
POTASSIUM SERPL-SCNC: 3.9 MMOL/L — SIGNIFICANT CHANGE UP (ref 3.5–5.3)
PROT SERPL-MCNC: 7.1 G/DL — SIGNIFICANT CHANGE UP (ref 6–8.3)
RBC # BLD: 3.77 M/UL — LOW (ref 3.8–5.2)
RBC # FLD: 22.8 % — HIGH (ref 10.3–14.5)
SODIUM SERPL-SCNC: 145 MMOL/L — SIGNIFICANT CHANGE UP (ref 135–145)
VANCOMYCIN TROUGH SERPL-MCNC: 14.8 UG/ML — SIGNIFICANT CHANGE UP (ref 10–20)
WBC # BLD: 12.71 K/UL — HIGH (ref 3.8–10.5)
WBC # FLD AUTO: 12.71 K/UL — HIGH (ref 3.8–10.5)

## 2022-10-24 PROCEDURE — 71045 X-RAY EXAM CHEST 1 VIEW: CPT | Mod: 26

## 2022-10-24 PROCEDURE — 99291 CRITICAL CARE FIRST HOUR: CPT | Mod: 24

## 2022-10-24 PROCEDURE — 99232 SBSQ HOSP IP/OBS MODERATE 35: CPT

## 2022-10-24 RX ORDER — HUMAN INSULIN 100 [IU]/ML
20 INJECTION, SUSPENSION SUBCUTANEOUS
Refills: 0 | Status: DISCONTINUED | OUTPATIENT
Start: 2022-10-25 | End: 2022-10-25

## 2022-10-24 RX ORDER — HUMAN INSULIN 100 [IU]/ML
10 INJECTION, SUSPENSION SUBCUTANEOUS
Refills: 0 | Status: DISCONTINUED | OUTPATIENT
Start: 2022-10-24 | End: 2022-10-25

## 2022-10-24 RX ORDER — POTASSIUM CHLORIDE 20 MEQ
20 PACKET (EA) ORAL ONCE
Refills: 0 | Status: COMPLETED | OUTPATIENT
Start: 2022-10-24 | End: 2022-10-24

## 2022-10-24 RX ORDER — ACETYLCYSTEINE 200 MG/ML
4 VIAL (ML) MISCELLANEOUS EVERY 12 HOURS
Refills: 0 | Status: DISCONTINUED | OUTPATIENT
Start: 2022-10-24 | End: 2022-11-22

## 2022-10-24 RX ORDER — MAGNESIUM SULFATE 500 MG/ML
2 VIAL (ML) INJECTION ONCE
Refills: 0 | Status: COMPLETED | OUTPATIENT
Start: 2022-10-24 | End: 2022-10-24

## 2022-10-24 RX ADMIN — MAGNESIUM OXIDE 400 MG ORAL TABLET 400 MILLIGRAM(S): 241.3 TABLET ORAL at 06:52

## 2022-10-24 RX ADMIN — MAGNESIUM OXIDE 400 MG ORAL TABLET 400 MILLIGRAM(S): 241.3 TABLET ORAL at 14:21

## 2022-10-24 RX ADMIN — Medication 4: at 12:11

## 2022-10-24 RX ADMIN — CHLORHEXIDINE GLUCONATE 1 APPLICATION(S): 213 SOLUTION TOPICAL at 06:52

## 2022-10-24 RX ADMIN — MAGNESIUM OXIDE 400 MG ORAL TABLET 400 MILLIGRAM(S): 241.3 TABLET ORAL at 21:29

## 2022-10-24 RX ADMIN — Medication 2: at 17:53

## 2022-10-24 RX ADMIN — APIXABAN 5 MILLIGRAM(S): 2.5 TABLET, FILM COATED ORAL at 17:56

## 2022-10-24 RX ADMIN — Medication 8 MILLIGRAM(S): at 06:53

## 2022-10-24 RX ADMIN — APIXABAN 5 MILLIGRAM(S): 2.5 TABLET, FILM COATED ORAL at 06:52

## 2022-10-24 RX ADMIN — SODIUM CHLORIDE 10 MILLILITER(S): 9 INJECTION INTRAMUSCULAR; INTRAVENOUS; SUBCUTANEOUS at 20:22

## 2022-10-24 RX ADMIN — Medication 3 MILLILITER(S): at 17:20

## 2022-10-24 RX ADMIN — DORZOLAMIDE HYDROCHLORIDE 1 DROP(S): 20 SOLUTION/ DROPS OPHTHALMIC at 14:21

## 2022-10-24 RX ADMIN — HUMAN INSULIN 10 UNIT(S): 100 INJECTION, SUSPENSION SUBCUTANEOUS at 17:53

## 2022-10-24 RX ADMIN — Medication 1 DROP(S): at 06:53

## 2022-10-24 RX ADMIN — Medication 40 MILLIGRAM(S): at 06:52

## 2022-10-24 RX ADMIN — FLUCONAZOLE 150 MILLIGRAM(S): 150 TABLET ORAL at 21:11

## 2022-10-24 RX ADMIN — Medication 12.5 MILLIGRAM(S): at 17:56

## 2022-10-24 RX ADMIN — Medication 25 GRAM(S): at 01:41

## 2022-10-24 RX ADMIN — Medication 3 MILLILITER(S): at 05:45

## 2022-10-24 RX ADMIN — Medication 1 TABLET(S): at 12:08

## 2022-10-24 RX ADMIN — Medication 5 MILLIGRAM(S): at 06:53

## 2022-10-24 RX ADMIN — MEROPENEM 100 MILLIGRAM(S): 1 INJECTION INTRAVENOUS at 14:21

## 2022-10-24 RX ADMIN — MEROPENEM 100 MILLIGRAM(S): 1 INJECTION INTRAVENOUS at 06:52

## 2022-10-24 RX ADMIN — MEXILETINE HYDROCHLORIDE 200 MILLIGRAM(S): 150 CAPSULE ORAL at 21:31

## 2022-10-24 RX ADMIN — Medication 0.5 MILLIGRAM(S): at 05:45

## 2022-10-24 RX ADMIN — DORZOLAMIDE HYDROCHLORIDE 1 DROP(S): 20 SOLUTION/ DROPS OPHTHALMIC at 21:32

## 2022-10-24 RX ADMIN — MEXILETINE HYDROCHLORIDE 200 MILLIGRAM(S): 150 CAPSULE ORAL at 14:20

## 2022-10-24 RX ADMIN — Medication 300 MILLIGRAM(S): at 05:46

## 2022-10-24 RX ADMIN — HUMAN INSULIN 20 UNIT(S): 100 INJECTION, SUSPENSION SUBCUTANEOUS at 12:10

## 2022-10-24 RX ADMIN — Medication 20 MILLIEQUIVALENT(S): at 01:41

## 2022-10-24 RX ADMIN — Medication 12.5 MILLIGRAM(S): at 06:53

## 2022-10-24 RX ADMIN — Medication 0.5 MILLIGRAM(S): at 17:21

## 2022-10-24 RX ADMIN — Medication 1 APPLICATION(S): at 18:29

## 2022-10-24 RX ADMIN — Medication 250 MILLIGRAM(S): at 17:55

## 2022-10-24 RX ADMIN — Medication 37.5 MILLIGRAM(S): at 21:31

## 2022-10-24 RX ADMIN — Medication 300 MILLIGRAM(S): at 17:20

## 2022-10-24 RX ADMIN — Medication 5 MILLIGRAM(S): at 14:20

## 2022-10-24 RX ADMIN — Medication 4 MILLILITER(S): at 05:45

## 2022-10-24 RX ADMIN — Medication 37.5 MILLIGRAM(S): at 14:22

## 2022-10-24 RX ADMIN — Medication 3 MILLILITER(S): at 11:34

## 2022-10-24 RX ADMIN — PANTOPRAZOLE SODIUM 40 MILLIGRAM(S): 20 TABLET, DELAYED RELEASE ORAL at 12:08

## 2022-10-24 RX ADMIN — Medication 250 MILLIGRAM(S): at 06:53

## 2022-10-24 RX ADMIN — DORZOLAMIDE HYDROCHLORIDE 1 DROP(S): 20 SOLUTION/ DROPS OPHTHALMIC at 06:52

## 2022-10-24 RX ADMIN — MEXILETINE HYDROCHLORIDE 200 MILLIGRAM(S): 150 CAPSULE ORAL at 06:53

## 2022-10-24 RX ADMIN — Medication 3 MILLILITER(S): at 23:50

## 2022-10-24 RX ADMIN — Medication 5 MILLIGRAM(S): at 21:32

## 2022-10-24 RX ADMIN — Medication 1 DROP(S): at 18:29

## 2022-10-24 RX ADMIN — Medication 37.5 MILLIGRAM(S): at 06:52

## 2022-10-24 RX ADMIN — Medication 4 MILLILITER(S): at 17:21

## 2022-10-24 NOTE — PROGRESS NOTE ADULT - ASSESSMENT
71 year-old female with a history of DM2, CAD, A-Fib on apixaban, Severe Persistent Asthma (on chronic steroids, recently started on Tezspire), colon cancer s/p resection/chemo, and tracheobronchomalacia s/p tracheoplasty, and recent OM of the R foot s/b debridement and completed course of Vancomycin/Ertapenem for MRSA/ESBL Proteus/Corynebacterium who now presents with chest pain, found to have Type A dissection s/p repair with modified Bentall procedure and hemiarch replacement on 9/6/22. Post-op course complicated by acute hypoxemic respiratory failure, shock, anemia, and hyperglycemia requiring insulin gtt. Extubated 9/13, now awake and alert with mild encephalopathy but overall significantly improved.    Assessment:  Acute hypoxemic respiratory failure requiring intubation  Type A Aortic Dissection  Severe Persistent Asthma  History of Tracheobronchomalacia  fevers and MSSA bacteremia and tracheal aspirate material with MSSA    -leukocytosis  Fevers and MRSA bacteremia last + culture 9/28  last positive Candida blood culture 9/30  Continue IV vancomycin- trough 15. on 10/18 is therapeutic maintain current dosing   Restarted on IV MEropenem in the setting bump in WBC and fever   SEnd blood cultures x2 sets  send UA and culture  repeat imaging of right elbow with ultrasound        MRSA  bacteremia and candidemia  Will plan to treat for 4-6 weeks in the setting of post surgical repair of thoracic aorta dissection  Continue to follow CBC  Continue to follow creatinine  Continue to follow Vanco trough levels  repeat blood cultures for evidence of fungemia and bacteremia clearance show evidence of clearing of infections  ESBL proteus in elbow fluid on prior tp was sensitive to meropenem      Jeff Calixto MD  Can be called via Teams  After 5pm/weekends [phone]       71 year-old female with a history of DM2, CAD, A-Fib on apixaban, Severe Persistent Asthma (on chronic steroids, recently started on Tezspire), colon cancer s/p resection/chemo, and tracheobronchomalacia s/p tracheoplasty, and recent OM of the R foot s/b debridement and completed course of Vancomycin/Ertapenem for MRSA/ESBL Proteus/Corynebacterium who now presents with chest pain, found to have Type A dissection s/p repair with modified Bentall procedure and hemiarch replacement on 9/6/22. Post-op course complicated by acute hypoxemic respiratory failure, shock, anemia, and hyperglycemia requiring insulin gtt. Extubated 9/13, now awake and alert with mild encephalopathy but overall significantly improved.    Assessment:  Acute hypoxemic respiratory failure requiring intubation  Type A Aortic Dissection  Severe Persistent Asthma  History of Tracheobronchomalacia  fevers and MSSA bacteremia and tracheal aspirate material with MSSA    -leukocytosis  Fevers and MRSA bacteremia last + culture 9/28  last positive Candida blood culture 9/30  Continue IV vancomycin- trough 15. on 10/18 is therapeutic maintain current dosing   Restarted on IV MEropenem in the setting bump in WBC and fever   SEnd blood cultures x2 sets  send UA and culture  repeat imaging of right elbow with ultrasound        MRSA  bacteremia and candidemia  Will plan to treat for 4-6 weeks in the setting of post surgical repair of thoracic aorta dissection  Continue to follow CBC  Continue to follow creatinine  Continue to follow Vanco trough levels  repeat blood cultures for evidence of fungemia and bacteremia clearance show evidence of clearing of infections  ESBL proteus in elbow fluid on prior tap was sensitive to meropenem and more recent aspiration is no growth wound vac in place over elbow      Jeff Calixto MD  Can be called via Teams  After 5pm/weekends [phone]

## 2022-10-24 NOTE — PROGRESS NOTE ADULT - ASSESSMENT
71 year-old female with a history of DM2, CAD, A-Fib on apixaban, Severe Persistent Asthma (on chronic steroids, recently started on Tezspire), colon cancer s/p resection/chemo, and tracheobronchomalacia s/p tracheoplasty, and recent OM of the R foot s/b debridement and completed course of Vancomycin/Ertapenem for MRSA/ESBL Proteus/Corynebacterium who now presents with chest pain, found to have Type A dissection s/p repair with modified Bentall procedure and hemiarch replacement on 22. Post-op course complicated by acute hypoxemic respiratory failure, shock, anemia, and hyperglycemia requiring insulin gtt. Extubated , now awake and alert with mild encephalopathy but overall significantly improved.    Assessment:  Acute hypoxemic respiratory failure requiring intubation  Type A Aortic Dissection  Severe Persistent Asthma  History of Tracheobronchomalacia    Plan:  See below:  -**************************                                SEE Below:  -improving but weakness  9/15-ABX adjusted to ceftriaxone (LFTS)-PICU  -PICU, low grade temp-fever work up in progress, no resp decline or sx  -resp stable at present but tenuous  -for FEESST today, NGT in place w/TF  -s/p FEESST-passed, remains in PICU          -TF in place at 40cc, more OOB          -hypoglycemia noted and rx, no change in resp status  -vented/sedated-pressors/inotropes/ABX  -remains vented on nitrous/less O2 needs, improving LFTS                   -vented/semi sedated--less O2 needs  10/3-on vanco, weaning sedation/, all lines changed  10/6-no changes-now afebrile-on vanco   10/7-no weaning-remains off pressors  10/10-for trach, no weaning done               10/11-s/p trach-uneventful  10/12-TC and ;cpap done and tolerated well  10/13-weaning TC continues              10/14-mucus issues-TC continues  10/24-TC x mult days-stable-secretion issues remain

## 2022-10-24 NOTE — PROGRESS NOTE ADULT - SUBJECTIVE AND OBJECTIVE BOX
INFECTIOUS DISEASES FOLLOW UP-- Fouzia Calixto  [phone]    This is a follow up note for this  72yFemale with  Dissection of thoracic aorta        ROS:  CONSTITUTIONAL:  No fever, good appetite  CARDIOVASCULAR:  No chest pain or palpitations  RESPIRATORY:  No dyspnea  GASTROINTESTINAL:  No nausea, vomiting, diarrhea, or abdominal pain  GENITOURINARY:  No dysuria  NEUROLOGIC:  No headache,     Allergies    aspirin (Short breath)  Avelox (Short breath; Pruritus)  cefepime (Anaphylaxis)  codeine (Short breath)  Dilaudid (Short breath)  iodine (Short breath; Swelling)  penicillin (Anaphylaxis)  shellfish (Anaphylaxis)  tetanus toxoid (Short breath)  Valium (Short breath)    Intolerances        ANTIBIOTICS/RELEVANT:  antimicrobials  fluconAZOLE IVPB 600 milliGRAM(s) IV Intermittent every 24 hours  meropenem  IVPB 1000 milliGRAM(s) IV Intermittent every 8 hours  tobramycin for Nebulization 300 milliGRAM(s) Inhalation every 12 hours  vancomycin  IVPB 750 milliGRAM(s) IV Intermittent every 12 hours  vancomycin  IVPB        immunologic:    OTHER:  acetaminophen    Suspension .. 650 milliGRAM(s) Oral every 6 hours PRN  acetylcysteine 10%  Inhalation 4 milliLiter(s) Inhalation every 12 hours  albuterol/ipratropium for Nebulization 3 milliLiter(s) Nebulizer every 6 hours  apixaban 5 milliGRAM(s) Oral every 12 hours  buDESOnide    Inhalation Suspension 0.5 milliGRAM(s) Inhalation every 12 hours  chlorhexidine 2% Cloths 1 Application(s) Topical <User Schedule>  collagenase Ointment 1 Application(s) Topical daily  dextrose 50% Injectable 50 milliLiter(s) IV Push every 15 minutes  dorzolamide 2% Ophthalmic Solution 1 Drop(s) Left EYE three times a day  furosemide    Tablet 40 milliGRAM(s) Oral daily  hydrALAZINE 37.5 milliGRAM(s) Oral every 8 hours  insulin lispro (ADMELOG) corrective regimen sliding scale   SubCutaneous every 6 hours  insulin NPH human recombinant 20 Unit(s) SubCutaneous every 6 hours  magnesium oxide 400 milliGRAM(s) Oral every 8 hours  methylPREDNISolone 8 milliGRAM(s) Oral daily  metoclopramide Injectable 5 milliGRAM(s) IV Push every 8 hours  metoprolol tartrate 12.5 milliGRAM(s) Enteral Tube every 12 hours  mexiletine 200 milliGRAM(s) Oral every 8 hours  multivitamin 1 Tablet(s) Oral daily  pantoprazole  Injectable 40 milliGRAM(s) IV Push daily  sodium chloride 0.9%. 1000 milliLiter(s) IV Continuous <Continuous>  timolol 0.5% Solution 1 Drop(s) Left EYE two times a day      Objective:  Vital Signs Last 24 Hrs  T(C): 36.7 (24 Oct 2022 13:05), Max: 37 (24 Oct 2022 00:00)  T(F): 98.1 (24 Oct 2022 13:05), Max: 98.6 (24 Oct 2022 00:00)  HR: 80 (24 Oct 2022 15:59) (68 - 86)  BP: --  BP(mean): --  RR: 26 (24 Oct 2022 15:59) (20 - 30)  SpO2: 93% (24 Oct 2022 15:59) (92% - 100%)    Parameters below as of 24 Oct 2022 15:59  Patient On (Oxygen Delivery Method): tracheostomy collar    O2 Concentration (%): 40    PHYSICAL EXAM:  Constitutional:no acute distress  Eyes:EBER, EOMI  Ear/Nose/Throat: no oral lesions, 	  Respiratory: clear BL  Cardiovascular: S1S2  Gastrointestinal:soft, (+) BS, no tenderness  Extremities:no e/e/c  No Lymphadenopathy  IV sites not inflammed.    LABS:                        8.7    12.71 )-----------( 317      ( 24 Oct 2022 00:15 )             30.4     10-24    145  |  103  |  23  ----------------------------<  81  3.9   |  32<H>  |  0.49<L>    Ca    9.8      24 Oct 2022 00:15  Phos  2.9     10-24  Mg     1.8     10-24    TPro  7.1  /  Alb  3.2<L>  /  TBili  0.2  /  DBili  x   /  AST  18  /  ALT  20  /  AlkPhos  115  10-24    PTT - ( 23 Oct 2022 00:40 )  PTT:57.6 sec      MICROBIOLOGY:            RECENT CULTURES:  10-22 @ 14:18  Joint Fl Arm - Right  --  --  --    No growth  --  10-21 @ 10:04  .Sputum Sputum trap  --  --  --    Normal Respiratory Jessica present  --  10-21 @ 05:05  .Blood Blood-Peripheral  --  --  --    No growth to date.  --  10-21 @ 05:04  .Blood Blood  --  --  --    No growth to date.  --      RADIOLOGY & ADDITIONAL STUDIES: INFECTIOUS DISEASES FOLLOW UP-- Fouzia Calixto  [phone]    This is a follow up note for this  72yFemale with  Dissection of thoracic aorta        ROS:  CONSTITUTIONAL:  No fever,   CARDIOVASCULAR:  No chest pain or palpitations  RESPIRATORY:  No dyspnea  GASTROINTESTINAL:  No nausea, vomiting, diarrhea, or abdominal pain  GENITOURINARY:  No dysuria  NEUROLOGIC:  No headache,     Allergies    aspirin (Short breath)  Avelox (Short breath; Pruritus)  cefepime (Anaphylaxis)  codeine (Short breath)  Dilaudid (Short breath)  iodine (Short breath; Swelling)  penicillin (Anaphylaxis)  shellfish (Anaphylaxis)  tetanus toxoid (Short breath)  Valium (Short breath)    Intolerances        ANTIBIOTICS/RELEVANT:  antimicrobials  fluconAZOLE IVPB 600 milliGRAM(s) IV Intermittent every 24 hours  meropenem  IVPB 1000 milliGRAM(s) IV Intermittent every 8 hours  tobramycin for Nebulization 300 milliGRAM(s) Inhalation every 12 hours  vancomycin  IVPB 750 milliGRAM(s) IV Intermittent every 12 hours  vancomycin  IVPB        immunologic:    OTHER:  acetaminophen    Suspension .. 650 milliGRAM(s) Oral every 6 hours PRN  acetylcysteine 10%  Inhalation 4 milliLiter(s) Inhalation every 12 hours  albuterol/ipratropium for Nebulization 3 milliLiter(s) Nebulizer every 6 hours  apixaban 5 milliGRAM(s) Oral every 12 hours  buDESOnide    Inhalation Suspension 0.5 milliGRAM(s) Inhalation every 12 hours  chlorhexidine 2% Cloths 1 Application(s) Topical <User Schedule>  collagenase Ointment 1 Application(s) Topical daily  dextrose 50% Injectable 50 milliLiter(s) IV Push every 15 minutes  dorzolamide 2% Ophthalmic Solution 1 Drop(s) Left EYE three times a day  furosemide    Tablet 40 milliGRAM(s) Oral daily  hydrALAZINE 37.5 milliGRAM(s) Oral every 8 hours  insulin lispro (ADMELOG) corrective regimen sliding scale   SubCutaneous every 6 hours  insulin NPH human recombinant 20 Unit(s) SubCutaneous every 6 hours  magnesium oxide 400 milliGRAM(s) Oral every 8 hours  methylPREDNISolone 8 milliGRAM(s) Oral daily  metoclopramide Injectable 5 milliGRAM(s) IV Push every 8 hours  metoprolol tartrate 12.5 milliGRAM(s) Enteral Tube every 12 hours  mexiletine 200 milliGRAM(s) Oral every 8 hours  multivitamin 1 Tablet(s) Oral daily  pantoprazole  Injectable 40 milliGRAM(s) IV Push daily  sodium chloride 0.9%. 1000 milliLiter(s) IV Continuous <Continuous>  timolol 0.5% Solution 1 Drop(s) Left EYE two times a day      Objective:  Vital Signs Last 24 Hrs  T(C): 36.7 (24 Oct 2022 13:05), Max: 37 (24 Oct 2022 00:00)  T(F): 98.1 (24 Oct 2022 13:05), Max: 98.6 (24 Oct 2022 00:00)  HR: 80 (24 Oct 2022 15:59) (68 - 86)  BP: --  BP(mean): --  RR: 26 (24 Oct 2022 15:59) (20 - 30)  SpO2: 93% (24 Oct 2022 15:59) (92% - 100%)    Parameters below as of 24 Oct 2022 15:59  Patient On (Oxygen Delivery Method): tracheostomy collar    O2 Concentration (%): 40    PHYSICAL EXAM:  Constitutional:no acute distress  Eyes:EBER, EOMI  Ear/Nose/Throat: no oral lesions, trach	  Respiratory: clear BL  Cardiovascular: S1S2  Gastrointestinal:soft, (+) BS, no tenderness., ostomy functioning  Extremities:no e/e/c right elbow wound vac  No Lymphadenopathy  IV sites not inflammed.    LABS:                        8.7    12.71 )-----------( 317      ( 24 Oct 2022 00:15 )             30.4     10-24    145  |  103  |  23  ----------------------------<  81  3.9   |  32<H>  |  0.49<L>    Ca    9.8      24 Oct 2022 00:15  Phos  2.9     10-24  Mg     1.8     10-24    TPro  7.1  /  Alb  3.2<L>  /  TBili  0.2  /  DBili  x   /  AST  18  /  ALT  20  /  AlkPhos  115  10-24    PTT - ( 23 Oct 2022 00:40 )  PTT:57.6 sec      MICROBIOLOGY:            RECENT CULTURES:  10-22 @ 14:18  Joint Fl Arm - Right  --  --  --    No growth  --  10-21 @ 10:04  .Sputum Sputum trap  --  --  --    Normal Respiratory Jessica present  --  10-21 @ 05:05  .Blood Blood-Peripheral  --  --  --    No growth to date.  --  10-21 @ 05:04  .Blood Blood  --  --  --    No growth to date.  --      RADIOLOGY & ADDITIONAL STUDIES:  < from: Xray Chest 1 View- PORTABLE-Routine (Xray Chest 1 View- PORTABLE-Routine in AM.) (10.23.22 @ 03:36) >    IMPRESSION:  No acute radiographic findings, resolution of mild pulmonary venous   congestion/perihilar interstitial infiltrates.    < end of copied text >

## 2022-10-24 NOTE — PROGRESS NOTE ADULT - SUBJECTIVE AND OBJECTIVE BOX
CHIEF COMPLAINT: f/up sob, chronic resp failure, TBM, severe persistent asthma, VC dysfunction, Type A aortic dissection s/p repair w/modified "Bentall procedure and hemiarch replacement 9/6-vented -awake and alert, no complaints--some mucus/secretion issues but over all better    Interval Events: none-TC 24 hrs x days    REVIEW OF SYSTEMS:  Constitutional: No fevers or chills. No weight loss. + fatigue or generalized malaise.  Eyes: No itching or discharge from the eyes  ENT: No ear pain. No ear discharge. No nasal congestion. No post nasal drip. No epistaxis. No throat pain. No sore throat. No difficulty swallowing.   CV: No chest pain. No palpitations. No lightheadedness or dizziness.   Resp: No dyspnea at rest. No dyspnea on exertion. No orthopnea. No wheezing. + cough. No stridor. + sputum production. No chest pain with respiration.  GI: No nausea. No vomiting. No diarrhea.  MSK: No joint pain or pain in any extremities  Integumentary: No skin lesions. No pedal edema.  Neurological: + gross motor weakness. No sensory changes.  [+ ] All other systems negative  [ ] Unable to assess ROS because ________    OBJECTIVE:  ICU Vital Signs Last 24 Hrs  T(C): 37 (24 Oct 2022 00:00), Max: 37 (24 Oct 2022 00:00)  T(F): 98.6 (24 Oct 2022 00:00), Max: 98.6 (24 Oct 2022 00:00)  HR: 70 (24 Oct 2022 04:00) (68 - 90)  BP: 157/70 (23 Oct 2022 13:00) (157/70 - 157/70)  BP(mean): 101 (23 Oct 2022 13:00) (101 - 101)  ABP: 159/50 (24 Oct 2022 04:00) (127/44 - 175/68)  ABP(mean): 85 (24 Oct 2022 04:00) (69 - 105)  RR: 23 (24 Oct 2022 04:00) (20 - 49)  SpO2: 97% (24 Oct 2022 04:00) (96% - 100%)    O2 Parameters below as of 24 Oct 2022 04:00  Patient On (Oxygen Delivery Method): tracheostomy collar    O2 Concentration (%): 40          10-22 @ 07:01  -  10-23 @ 07:00  --------------------------------------------------------  IN: 1920 mL / OUT: 2650 mL / NET: -730 mL    10-23 @ 07:01  -  10-24 @ 04:45  --------------------------------------------------------  IN: 2284 mL / OUT: 1550 mL / NET: 734 mL      CAPILLARY BLOOD GLUCOSE      POCT Blood Glucose.: 84 mg/dL (23 Oct 2022 23:58)      PHYSICAL EXAM: NAD on TC  General: Awake, alert, oriented X 3.   HEENT: Atraumatic, normocephalic.                 Mallampatti Grade 3                No nasal congestion.                No tonsillar or pharyngeal exudates.  Lymph Nodes: No palpable lymphadenopathy  Neck: No JVD. No carotid bruit.   Respiratory: abnormal chest expansion                         Normal percussion                         Normal and equal air entry                         No wheeze,+ rhonchi or rales.  Cardiovascular: S1 S2 normal. No murmurs, rubs or gallops.   Abdomen: Soft, non-tender, non-distended. No organomegaly. Normoactive bowel sounds.  Extremities: Warm to touch. Peripheral pulse palpable. No pedal edema.   Skin: No rashes or skin lesions  Neurological: Motor and sensory examination equal and abnormal in all four extremities.  Psychiatry: Appropriate mood and affect.    HOSPITAL MEDICATIONS:  MEDICATIONS  (STANDING):  acetylcysteine 10%  Inhalation 4 milliLiter(s) Inhalation every 12 hours  albuterol/ipratropium for Nebulization 3 milliLiter(s) Nebulizer every 6 hours  apixaban 5 milliGRAM(s) Oral every 12 hours  buDESOnide    Inhalation Suspension 0.5 milliGRAM(s) Inhalation every 12 hours  chlorhexidine 2% Cloths 1 Application(s) Topical <User Schedule>  collagenase Ointment 1 Application(s) Topical daily  dextrose 50% Injectable 50 milliLiter(s) IV Push every 15 minutes  dorzolamide 2% Ophthalmic Solution 1 Drop(s) Left EYE three times a day  fluconAZOLE IVPB 600 milliGRAM(s) IV Intermittent every 24 hours  furosemide    Tablet 40 milliGRAM(s) Oral daily  hydrALAZINE 37.5 milliGRAM(s) Oral every 8 hours  insulin lispro (ADMELOG) corrective regimen sliding scale   SubCutaneous every 6 hours  insulin NPH human recombinant 20 Unit(s) SubCutaneous every 6 hours  magnesium oxide 400 milliGRAM(s) Oral every 8 hours  meropenem  IVPB 1000 milliGRAM(s) IV Intermittent every 8 hours  methylPREDNISolone 8 milliGRAM(s) Oral daily  metoclopramide Injectable 5 milliGRAM(s) IV Push every 8 hours  metoprolol tartrate 12.5 milliGRAM(s) Enteral Tube every 12 hours  mexiletine 200 milliGRAM(s) Oral every 8 hours  multivitamin 1 Tablet(s) Oral daily  pantoprazole  Injectable 40 milliGRAM(s) IV Push daily  sodium chloride 0.9%. 1000 milliLiter(s) (10 mL/Hr) IV Continuous <Continuous>  timolol 0.5% Solution 1 Drop(s) Left EYE two times a day  tobramycin for Nebulization 300 milliGRAM(s) Inhalation every 12 hours  vancomycin  IVPB 750 milliGRAM(s) IV Intermittent every 12 hours  vancomycin  IVPB        MEDICATIONS  (PRN):  acetaminophen    Suspension .. 650 milliGRAM(s) Oral every 6 hours PRN Temp greater or equal to 38C (100.4F), Mild Pain (1 - 3)      LABS:                        8.7    12.71 )-----------( 317      ( 24 Oct 2022 00:15 )             30.4     10-24    145  |  103  |  23  ----------------------------<  81  3.9   |  32<H>  |  0.49<L>    Ca    9.8      24 Oct 2022 00:15  Phos  2.9     10-24  Mg     1.8     10-24    TPro  7.1  /  Alb  3.2<L>  /  TBili  0.2  /  DBili  x   /  AST  18  /  ALT  20  /  AlkPhos  115  10-24    PTT - ( 23 Oct 2022 00:40 )  PTT:57.6 sec    Arterial Blood Gas:  10-24 @ 00:00  7.46/51/95/36/98.5/11.1  ABG lactate: --  Arterial Blood Gas:  10-23 @ 19:50  7.46/54/97/38/98.1/12.9  ABG lactate: --  Arterial Blood Gas:  10-23 @ 09:13  7.47/53/75/39/97.1/12.7  ABG lactate: --        MICROBIOLOGY:     RADIOLOGY:  [ ] Reviewed and interpreted by me    Point of Care Ultrasound Findings:    PFT:    EKG:

## 2022-10-24 NOTE — PROGRESS NOTE ADULT - SUBJECTIVE AND OBJECTIVE BOX
Plastic Surgery Progress Note      SUBJECTIVE: Patient seen and examined at bedside with plastic surgical team. Elbow Dressing changed this AM.     OBJECTIVE:    Vital Signs Last 24 Hrs  T(C): 36.7 (24 Oct 2022 07:46), Max: 37 (24 Oct 2022 00:00)  T(F): 98 (24 Oct 2022 07:46), Max: 98.6 (24 Oct 2022 00:00)  HR: 82 (24 Oct 2022 09:00) (68 - 90)  BP: 157/70 (23 Oct 2022 13:00) (157/70 - 157/70)  BP(mean): 101 (23 Oct 2022 13:00) (101 - 101)  RR: 30 (24 Oct 2022 09:00) (20 - 41)  SpO2: 95% (24 Oct 2022 09:00) (94% - 100%)    Parameters below as of 24 Oct 2022 08:38  Patient On (Oxygen Delivery Method): tracheostomy collar    O2 Concentration (%): 40I&O's Detail    23 Oct 2022 07:01  -  24 Oct 2022 07:00  --------------------------------------------------------  IN:    Enteral Tube Flush: 330 mL    Glucerna 1.5: 1560 mL    Heparin: 9 mL    IV PiggyBack: 1000 mL  Total IN: 2899 mL    OUT:    Colostomy (mL): 350 mL    Voided (mL): 2000 mL  Total OUT: 2350 mL    Total NET: 549 mL      24 Oct 2022 07:01  -  24 Oct 2022 09:48  --------------------------------------------------------  IN:    Glucerna 1.5: 130 mL  Total IN: 130 mL    OUT:  Total OUT: 0 mL    Total NET: 130 mL      MEDICATIONS  (STANDING):  albuterol/ipratropium for Nebulization 3 milliLiter(s) Nebulizer every 6 hours  apixaban 5 milliGRAM(s) Oral every 12 hours  buDESOnide    Inhalation Suspension 0.5 milliGRAM(s) Inhalation every 12 hours  chlorhexidine 2% Cloths 1 Application(s) Topical <User Schedule>  collagenase Ointment 1 Application(s) Topical daily  dextrose 50% Injectable 50 milliLiter(s) IV Push every 15 minutes  dorzolamide 2% Ophthalmic Solution 1 Drop(s) Left EYE three times a day  fluconAZOLE IVPB 600 milliGRAM(s) IV Intermittent every 24 hours  furosemide    Tablet 40 milliGRAM(s) Oral daily  hydrALAZINE 37.5 milliGRAM(s) Oral every 8 hours  insulin lispro (ADMELOG) corrective regimen sliding scale   SubCutaneous every 6 hours  insulin NPH human recombinant 20 Unit(s) SubCutaneous every 6 hours  magnesium oxide 400 milliGRAM(s) Oral every 8 hours  meropenem  IVPB 1000 milliGRAM(s) IV Intermittent every 8 hours  methylPREDNISolone 8 milliGRAM(s) Oral daily  metoclopramide Injectable 5 milliGRAM(s) IV Push every 8 hours  metoprolol tartrate 12.5 milliGRAM(s) Enteral Tube every 12 hours  mexiletine 200 milliGRAM(s) Oral every 8 hours  multivitamin 1 Tablet(s) Oral daily  pantoprazole  Injectable 40 milliGRAM(s) IV Push daily  sodium chloride 0.9%. 1000 milliLiter(s) (10 mL/Hr) IV Continuous <Continuous>  timolol 0.5% Solution 1 Drop(s) Left EYE two times a day  tobramycin for Nebulization 300 milliGRAM(s) Inhalation every 12 hours  vancomycin  IVPB 750 milliGRAM(s) IV Intermittent every 12 hours  vancomycin  IVPB        MEDICATIONS  (PRN):  acetaminophen    Suspension .. 650 milliGRAM(s) Oral every 6 hours PRN Temp greater or equal to 38C (100.4F), Mild Pain (1 - 3)      PHYSICAL EXAM    General: NAD  Ext: R elbow WTD dressing c/d/i. Wound base clean, no pus or discharge. Early granulation tissue with fibrinous edges    LABS:                        8.7    12.71 )-----------( 317      ( 24 Oct 2022 00:15 )             30.4     10-24    145  |  103  |  23  ----------------------------<  81  3.9   |  32<H>  |  0.49<L>    Ca    9.8      24 Oct 2022 00:15  Phos  2.9     10-24  Mg     1.8     10-24    TPro  7.1  /  Alb  3.2<L>  /  TBili  0.2  /  DBili  x   /  AST  18  /  ALT  20  /  AlkPhos  115  10-24    PTT - ( 23 Oct 2022 00:40 )  PTT:57.6 sec  LIVER FUNCTIONS - ( 24 Oct 2022 00:15 )  Alb: 3.2 g/dL / Pro: 7.1 g/dL / ALK PHOS: 115 U/L / ALT: 20 U/L / AST: 18 U/L / GGT: x                 IMAGING:

## 2022-10-24 NOTE — PROGRESS NOTE ADULT - PROBLEM SELECTOR PLAN 1
-test BG q6h if NPO/TF   -Change NPH doses to 20 units at 6am and 12noon plus 10 units at 6pm and 12mn. Hold NPH if TFs are off or BG <100.  -C/w Admelog moderate correction scale q6h for now  Discharge plan:  - Likely to discharge patient home on basal/bolus insulin. Final regimen pending clinical course.  - Recommend routine outpatient ophthalmology, podiatry  - Can f/u with endocrinologist Dr. Saavedra.  - Make sure pt has Rx for all DM supplies and insulin/ DM meds.  -Based on pt's clinical condition and mental status at this time she is not able to independently manage her DM. Will evaluate pt's ability to manage DM at time of discharge. If unable> pt will need family/ care giver (s) to manage DM care at home  -Will need rehab.

## 2022-10-24 NOTE — PROGRESS NOTE ADULT - SUBJECTIVE AND OBJECTIVE BOX
Patient is a 72y old  Female who presents with a chief complaint of Type A aortic dissection (24 Oct 2022 16:54)       INTERVAL HPI/OVERNIGHT EVENTS:  Patient seen and evaluated at bedside.  Pt is resting comfortable in NAD.     Allergies    aspirin (Short breath)  Avelox (Short breath; Pruritus)  cefepime (Anaphylaxis)  codeine (Short breath)  Dilaudid (Short breath)  iodine (Short breath; Swelling)  penicillin (Anaphylaxis)  shellfish (Anaphylaxis)  tetanus toxoid (Short breath)  Valium (Short breath)    Intolerances        Vital Signs Last 24 Hrs  T(C): 37 (24 Oct 2022 20:00), Max: 37 (24 Oct 2022 00:00)  T(F): 98.6 (24 Oct 2022 20:00), Max: 98.6 (24 Oct 2022 00:00)  HR: 67 (24 Oct 2022 20:00) (66 - 88)  BP: --  BP(mean): --  RR: 23 (24 Oct 2022 20:00) (20 - 30)  SpO2: 95% (24 Oct 2022 20:00) (92% - 100%)    Parameters below as of 24 Oct 2022 20:00  Patient On (Oxygen Delivery Method): tracheostomy collar    O2 Concentration (%): 40    LABS:                        8.7    12.71 )-----------( 317      ( 24 Oct 2022 00:15 )             30.4     10-24    145  |  103  |  23  ----------------------------<  81  3.9   |  32<H>  |  0.49<L>    Ca    9.8      24 Oct 2022 00:15  Phos  2.9     10-24  Mg     1.8     10-24    TPro  7.1  /  Alb  3.2<L>  /  TBili  0.2  /  DBili  x   /  AST  18  /  ALT  20  /  AlkPhos  115  10-24    PTT - ( 23 Oct 2022 00:40 )  PTT:57.6 sec    CAPILLARY BLOOD GLUCOSE      POCT Blood Glucose.: 195 mg/dL (24 Oct 2022 17:09)  POCT Blood Glucose.: 231 mg/dL (24 Oct 2022 11:59)  POCT Blood Glucose.: 83 mg/dL (24 Oct 2022 06:35)  POCT Blood Glucose.: 84 mg/dL (23 Oct 2022 23:58)      Lower Extremity Physical Exam:  Vascular: DP/PT 2/4 B/L, CFT <3 seconds B/L, Temperature gradient warm to cool B/L  Neuro: Epicritic sensation intact to the level of midfoot B/L  Musculoskeletal/Ortho: unremarkable   Skin: right foot with two navicular wounds with fibrous wound bed, no clinical signs of infection. L foot with no clinical signs of infection.

## 2022-10-24 NOTE — PROGRESS NOTE ADULT - SUBJECTIVE AND OBJECTIVE BOX
DIABETES FOLLOW UP NOTE: Saw pt earlier today    Chief Complaint: Endocrine consult requested for management of T2DM    INTERVAL HX: Saw pt in CTU, appears weak and tired but able to nod yes/no to questions. BG levels not at goal with values in 80s last night and this am> team held NPH dose for 12mn and 6am with rebound hyperglycemia at 12noon (200s). Per staff and team pt was getting TFs overnight at 65cc/hr. No further emesis. No hypoglycemia.  Remains on antibiotics for sepsis     Review of Systems:  General: As above  Cardiovascular: No chest pain, palpitations  Respiratory: No SOB, no cough  GI: No nausea, vomiting, abdominal pain  Endocrine: No S&Sx of hypoglycemia    Allergies    aspirin (Short breath)  Avelox (Short breath; Pruritus)  cefepime (Anaphylaxis)  codeine (Short breath)  Dilaudid (Short breath)  iodine (Short breath; Swelling)  penicillin (Anaphylaxis)  shellfish (Anaphylaxis)  tetanus toxoid (Short breath)  Valium (Short breath)    Intolerances      MEDICATIONS:  fluconAZOLE IVPB 600 milliGRAM(s) IV Intermittent every 24 hours  insulin lispro (ADMELOG) corrective regimen sliding scale   SubCutaneous every 6 hours  insulin NPH human recombinant 20 Unit(s) SubCutaneous every 6 hours  meropenem  IVPB 1000 milliGRAM(s) IV Intermittent every 8 hours  methylPREDNISolone 8 milliGRAM(s) Oral daily  tobramycin for Nebulization 300 milliGRAM(s) Inhalation every 12 hours  vancomycin  IVPB 750 milliGRAM(s) IV Intermittent every 12 hours        PHYSICAL EXAM:  VITALS: T(C): 36.7 (10-24-22 @ 13:05)  T(F): 98.1 (10-24-22 @ 13:05), Max: 98.6 (10-24-22 @ 00:00)  HR: 80 (10-24-22 @ 15:59) (68 - 86)  BP: --  RR:  (20 - 30)  SpO2:  (92% - 100%)  Wt(kg): --  GENERAL: Female sitting in chair in NAD.  HEENT: Trach in place, NGT with TFs on at 65cc/hr  Chest: Sternal incision healed  Abdomen: Soft, nontender, non distended  Extremities: Warm, no edema in all 4 exts. PT placing  wound vac in RUE at time of visit   NEURO: Alert and able to nod to questions    LABS:  POCT Blood Glucose.: 231 mg/dL (10-24-22 @ 11:59)  POCT Blood Glucose.: 83 mg/dL (10-24-22 @ 06:35)  POCT Blood Glucose.: 84 mg/dL (10-23-22 @ 23:58)  POCT Blood Glucose.: 201 mg/dL (10-23-22 @ 17:04)  POCT Blood Glucose.: 207 mg/dL (10-23-22 @ 11:24)  POCT Blood Glucose.: 78 mg/dL (10-23-22 @ 05:49)  POCT Blood Glucose.: 196 mg/dL (10-22-22 @ 23:34)  POCT Blood Glucose.: 211 mg/dL (10-22-22 @ 18:23)  POCT Blood Glucose.: 318 mg/dL (10-22-22 @ 13:06)  POCT Blood Glucose.: 208 mg/dL (10-22-22 @ 05:19)  POCT Blood Glucose.: 250 mg/dL (10-22-22 @ 00:39)  POCT Blood Glucose.: 281 mg/dL (10-21-22 @ 17:46)                            8.7    12.71 )-----------( 317      ( 24 Oct 2022 00:15 )             30.4       10-24    145  |  103  |  23  ----------------------------<  81  3.9   |  32<H>  |  0.49<L>    eGFR: 99    Ca    9.8      10-24  Mg     1.8     10-24  Phos  2.9     10-24    TPro  7.1  /  Alb  3.2<L>  /  TBili  0.2  /  DBili  x   /  AST  18  /  ALT  20  /  AlkPhos  115  10-24      Thyroid Function Tests:  10-03 @ 04:47 TSH 0.32 FreeT4 -- T3 -- Anti TPO -- Anti Thyroglobulin Ab -- TSI --      A1C with Estimated Average Glucose Result: 9.4 % (09-06-22 @ 16:46)      Estimated Average Glucose: 223 mg/dL (09-06-22 @ 16:46)

## 2022-10-24 NOTE — PROGRESS NOTE ADULT - ASSESSMENT
71F w/h/o uncontrolled T2DM (A1C 9.4%) on basal/bolus insulin PTA. Unknown DM complications. Also COPD, secondary adrenal Insufficiency on chronic steroids, colorectal cancer s/p resection (colostomy bag), Chronic A fib on Eliquis, and tracheomalacia and multiple intubations, recent dx of OM presented to ED at [facility] with epigastric pain, belching and central chest pain. Found on CTA to have type A aortic dissection and transferred to [facility] for surgical evaluation by Dr. Cabrera. Now s/p aortic dissection repair on 9/07/22. Endocrine consulted for assistance with uncontrolled DM/steroids for AI. Pt in ICU with ventricular dysfunction/sepsis, and now s/p trach 10/10 and more recently s/p R elbow eschar debridement 10/22 > starting Wound VAC today. Tolerating TFs with BG levels not at goal. Lower BG values overnigyht and high BG values in day time after pt get steroid doses. Will adjust insulin doses accordingly to keep BG goal 100 [id number].Pt remains with leukocytosis and resolved transaminitis. On Prednisone dose 8mg for AI.

## 2022-10-24 NOTE — PROGRESS NOTE ADULT - ASSESSMENT
RAYRAY RODRIGUEZ is a 72y Female s/p R elbow eschar debridement 10/22    - Wound VAC placement today, discussed with wound care team.   - Appreciate excellent care per primary team      Plastic Surgery   [facility]: [phone]

## 2022-10-24 NOTE — PROGRESS NOTE ADULT - NSPROGADDITIONALINFOA_GEN_ALL_CORE
-Plan discussed with pt/team.  Contact info: [phone] (24/7). pager [phone]  [email or web address] password [email or web address]  Teams  Spent 28 minutes assessing pt/labs/meds and discussing plan of care with primary team  Adjusting insulin  Discharge plan  Follow up care

## 2022-10-24 NOTE — PROGRESS NOTE ADULT - SUBJECTIVE AND OBJECTIVE BOX
Patient seen and examined at the bedside.    Remained critically ill on continuous ICU monitoring.    24 Hour events:        OBJECTIVE:  Vital Signs Last 24 Hrs  T(C): 37 (24 Oct 2022 00:00), Max: 37 (24 Oct 2022 00:00)  T(F): 98.6 (24 Oct 2022 00:00), Max: 98.6 (24 Oct 2022 00:00)  HR: 72 (24 Oct 2022 07:00) (68 - 90)  BP: 157/70 (23 Oct 2022 13:00) (157/70 - 157/70)  BP(mean): 101 (23 Oct 2022 13:00) (101 - 101)  RR: 20 (24 Oct 2022 07:00) (20 - 41)  SpO2: 96% (24 Oct 2022 07:00) (96% - 100%)    Parameters below as of 24 Oct 2022 06:01  Patient On (Oxygen Delivery Method): tracheostomy collar          Physical Exam:   General: OOBTC, NAD  Neurology: interactive, able to follow simple commands, denies pain  ENT/Neck: + trach c/d/i, neck supple, trachea midline   Respiratory: coarse BS b/l, rhonchi throughout.   CV: S1S2, no murmurs        [x]Sinus Rhythm   Abdominal: Soft, NT, ND, colostomy in place  Extremities: +4 RUE edema, 3+LUE edema, trace edema noted, + peripheral pulses   Skin: Dry dressing over right heel, R elbow wound w/ overlying cling dressing c/d/i                    Assessment:  71F PMH DM, COPD, chronic adrenal insufficiency on Prednisone, history of colorectal cancer s/p resection (colostomy bag), Hx of CAD, chronic AF on Eliquis, and tracheomalacia s/p multiple intubations, recent dx of R foot OM s/p debridement on antibiotics and presented to ED at [facility] this morning with epigastric pain, belching and central chest pain. Found on CTA to have type A aortic dissection and transferred to [facility] for surgical evaluation by Dr. Cabrera.     Ascending aortic dissection s/p type A dissection repair and Biobentall on 9/7   Respiratory failure s/p Trach 10/10/22  Hypovolemia   Post op respiratory failure   Acute blood loss anemia  Stress hyperglycemia   Leukocytosis   RIJ thrombus  MRSA PNA        Plan:   ***Neuro***  follows simple commands, continue to monitor  PT/OT progress as tolerated, standing exercises       ***Cardiovascular***  Limited TTE on 10/1: EF 69%, Hyperdynamic left ventricular systolic function. There is hypokinesis of the mid-base inferior wall. Nml RV fxn.   CT chest on 9/28: No CT evidence of pulmonary embolism. Status post repair of ascending aortic dissection with a stable dissection flap originating from the aortic arch and extending into the infrarenal abdominal aorta,  B/l UE duplex on 10/5: As before, there is an occluded RIGHT IJ vein with thrombosis extending to brachiocephalic vein. Superficial thrombophlebitis of the LEFT cephalic vein.   Invasive hemodynamic monitoring, assess perfusion indices   SR / MAP 80/ Hct 30.4/ Lactate 1.1  [x] Hydralazine PO for BP management up-titrated to 37.5 TID  Rate control with Mexiletine and Lopressor   [x] AC Therapy changed from heparin gtt to Eliquis 5 BID for Afib and IJ thrombus  Reassessment of hemodynamics  [x] Eliquis   Serial EKG and cardiac enzymes     ***Pulmonary***  Respiratory failure s/p Trach #8 portex  10/10/22, per ENT inner cannula needs to be changed daily, trach sutures d/c'ed by ENT 10/17   Post op vent management   Titration of FiO2 and PEEP, follow SpO2, CXR, blood gasses   Bronched 10/13  Continue with Trach Collar trials   Ambu bag and suction as needed for thick secretions, continue mucomyst/duonebs  inhaled tobra added 10/21 for increase secretions, bag and suction prn                ***GI***  [x] Diet:  TFs Glucerna 1.2 @ goal 65ml/hr, tolerating without issues  [x] Protonix    Reglan for gut motility        ***Renal***  Continue to monitor I/Os, BUN/Creatinine.   Replete lytes PRN  Diuresis with Lasix 40 IV BID, changed to PO, pt euvolemic       ***ID***  SCx on 10/4+Methicillin resistant Staphylococcus aureus, c/w IVPB Vancomycin, (plan for 6 week course in setting of valve surgery, 11/26end date)  9/27 blood culture Neela Glabarata, on flucanazole (lifelong suppression)  R elbow wound, S/p IR for elbow drainage 10/13 + Few Proteus mirabilis ESBL, Meropenem 7 day trial completed 10/19-> reordered 10/20 for reaccumulation of elbow brusitis--> now continuing. Ortho reconsulted and plastic surgery for ? drainage vs debridement  BCx 10/13 NGTD, BC 10/21 and SCx NGTD  10/22 plastics debrided R elbow eschar to subc tissue, ~8cc fluid aspirated and sent for culture. Wet to dry dressing changed 2x daily and plan for PT to place vac on Monday 10/24.  Joint Fluid Cx 10/22 +Moderate polymorphonuclear leukocytes, no organisims  Kaz for inhalation     ***Endocrine***  [x]  DM : HbA1c 9.4%                - [x] ISS  [x] NPH             - Need tight glycemic control to prevent wound infection.        Patient requires continuous monitoring with bedside rhythm monitoring, pulse oximetry monitoring, and continuous central venous and arterial pressure monitoring; and intermittent blood gas analysis. Care plan discussed with the ICU care team.   Patient remained critical, at risk for life threatening decompensation.    I have spent 30 minutes providing critical care management to this patient.    By signing my name below, I, Kieran Plascencia, attest that this documentation has been prepared under the direction and in the presence of Gena Meyer NP  Electronically signed: Georgi Cordero, 10-24-22 @ 07:30    I, Gena Meyer NP, personally performed the services described in this documentation. all medical record entries made by the marcyibe were at my direction and in my presence. I have reviewed the chart and agree that the record reflects my personal performance and is accurate and complete  Electronically signed: Gena Meyer NP  Patient seen and examined at the bedside.    Remained critically ill on continuous ICU monitoring.    24 Hour events:  - Remains on cont TC since 10/18  -OOBTC this morning, no complaints      OBJECTIVE:  Vital Signs Last 24 Hrs  T(C): 37 (24 Oct 2022 00:00), Max: 37 (24 Oct 2022 00:00)  T(F): 98.6 (24 Oct 2022 00:00), Max: 98.6 (24 Oct 2022 00:00)  HR: 72 (24 Oct 2022 07:00) (68 - 90)  BP: 157/70 (23 Oct 2022 13:00) (157/70 - 157/70)  BP(mean): 101 (23 Oct 2022 13:00) (101 - 101)  RR: 20 (24 Oct 2022 07:00) (20 - 41)  SpO2: 96% (24 Oct 2022 07:00) (96% - 100%)    Parameters below as of 24 Oct 2022 06:01  Patient On (Oxygen Delivery Method): tracheostomy collar          Physical Exam:   General: OOBTC, NAD, generalized weakness  Neurology: interactive, able to follow simple commands, denies pain  ENT/Neck: + trach c/d/i, neck supple, trachea midline   Respiratory: coarse BS b/l, rhonchi throughout, minimal secretions  CV: S1S2, no murmurs        [x]Sinus Rhythm (pAF)  Abdominal: Soft, NT, ND, colostomy in place (stoma intact)  Extremities: +4 RUE edema, 3+LUE edema, trace edema noted, + peripheral pulses   Skin: Dry dressing over right heel, R elbow wound w/ overlying cling dressing c/d/i                    Assessment:  71F PMH DM, COPD, chronic adrenal insufficiency on Prednisone, history of colorectal cancer s/p resection (colostomy bag), Hx of CAD, chronic AF on Eliquis, and tracheomalacia s/p multiple intubations, recent dx of R foot OM s/p debridement on antibiotics and presented to ED at [facility] this morning with epigastric pain, belching and central chest pain. Found on CTA to have type A aortic dissection and transferred to [facility] for surgical evaluation by Dr. Cabrera.     Ascending aortic dissection s/p type A dissection repair and Biobentall on 9/7   Respiratory failure s/p Trach 10/10/22  Hypovolemia   Post op respiratory failure   Acute blood loss anemia  Stress hyperglycemia   Leukocytosis   RIJ thrombus  MRSA PNA        Plan:   ***Neuro***  follows simple commands, continue to monitor  PT/OT progress as tolerated, standing exercises       ***Cardiovascular***  Limited TTE on 10/1: EF 69%, Hyperdynamic left ventricular systolic function. There is hypokinesis of the mid-base inferior wall. Nml RV fxn.   CT chest on 9/28: No CT evidence of pulmonary embolism. Status post repair of ascending aortic dissection with a stable dissection flap originating from the aortic arch and extending into the infrarenal abdominal aorta,  B/l UE duplex on 10/5: As before, there is an occluded RIGHT IJ vein with thrombosis extending to brachiocephalic vein. Superficial thrombophlebitis of the LEFT cephalic vein.   Invasive hemodynamic monitoring, assess perfusion indices   SR / MAP 80/ Hct 30.4/ Lactate 1.1  [x] Hydralazine PO for BP management up-titrated to 37.5 TID  Rate control with Mexiletine and Lopressor (pAF)  [x] AC Therapy with Eliquis 5 BID for Afib and IJ thrombus  Reassessment of hemodynamics      ***Pulmonary***  Respiratory failure s/p Trach #8 portex  10/10/22, per ENT inner cannula needs to be changed daily, trach sutures d/c'ed by ENT 10/17   Post op vent management   Titration of FiO2, follow SpO2, CXR, blood gasses   Bronched 10/13  Continue with Trach Collar trials continuous TC since 10/18 @6am  Secretions improved will DC mucomyst, continue duonebs  inhaled tobra added 10/21 for increase secretions (for 5d, to complete 10/26), bag and suction prn                ***GI***  [x] Diet:  TFs Glucerna 1.2 @ goal 65ml/hr, tolerating without issues (when glucerna 1.5 back in stock will switch and decrease goal rate to 55ml/hr)  [x] Protonix    Reglan for gut motility        ***Renal***  Continue to monitor I/Os, BUN/Creatinine.   Replete lytes PRN  Diuresis with Lasix 40 PO/D, pt euvolemic       ***ID***  SCx on 10/4+Methicillin resistant Staphylococcus aureus, c/w IVPB Vancomycin, (plan for 6 week course in setting of valve surgery, 11/26 end date)  9/27 blood culture Neela Glabarata, on flucanazole (lifelong suppression)  BCx 10/13 NGTD, BC 10/21 and SCx NGTD  R elbow wound, S/p IR for elbow drainage 10/13 + Few Proteus mirabilis ESBL, Meropenem 7 day trial completed 10/19-> reordered 10/20 for reaccumulation of elbow brusitis--> now continuing. Ortho reconsulted and plastic surgery  10/22 plastics debrided R elbow eschar to subc tissue, ~8cc fluid aspirated and sent for culture. Wet to dry dressing changed 2x daily and plan for PT to place vac today  Joint Fluid Cx 10/22 NTD  Kaz for inhalation (completes 10/26)  Meropenum for 8 day course (until 10/28),started in setting of elbow collection 10/22, f/u ID for possible shorter course given culture NTD    ***Endocrine***  [x]  DM : HbA1c 9.4%                - [x] ISS  [x] NPH             - Need tight glycemic control to prevent wound infection.  Endocrine following, appreciate recommendations        Patient requires continuous monitoring with bedside rhythm monitoring, pulse oximetry monitoring, and continuous central venous and arterial pressure monitoring; and intermittent blood gas analysis. Care plan discussed with the ICU care team.   Patient remained critical, at risk for life threatening decompensation.    I have spent 30 minutes providing critical care management to this patient.    By signing my name below, I, Kieran Plascencia, attest that this documentation has been prepared under the direction and in the presence of Gena Meyer NP  Electronically signed: Rogers Cordero, 10-24-22 @ 07:30    I, Gena Meyer NP, personally performed the services described in this documentation. all medical record entries made by the rogers were at my direction and in my presence. I have reviewed the chart and agree that the record reflects my personal performance and is accurate and complete  Electronically signed: Gena Meyer NP

## 2022-10-24 NOTE — PROGRESS NOTE ADULT - TIME BILLING
as above: TC continues-s/p  trach 10/10--remains in resp failure-sepsis [id number]-[id number] vented/ABX-stable CV status-should use VEST rx if ok w/ CTS  multifactorial dyspnea-resp failure-severe persistent asthma, TBM s/p tracheoplasty, s/p Aortic aneurysm repair, Bronchitis (proteus)-O2-keep 90%;TC  severe persistent asthma--medrol 8mg, singulair 10, duoneb q 6, budes .5 bid, tezspire 8/29-next 9/29  TBM-s/p tracheoplasty--accapella, unable to use vest due to surgery  s/p Aortic aneurysm repair--as per CTS staff care  AF-on heparin--eventual eliquis rx               CV-improved cardene-MAP above 60  DVT R-IJ-on heparin rx  ID-s/p proteus bronchitis-s/p meropenum changed to ceftriaxone and back to meropenem/vanco- off of ABX as of 9/19--restart of ABX 9/27-meropenem/vanco-NOW on meropenem and vanco for MRSE sepsis (11/26 end date for vanco), plastics/ortho for elbow-proteus  PT-OOB as able                             GI-TF as able--f/up LFTs       VC dysfunction--aspiration precautions-s/p trach 10/10  GOC                                    Heme onc f/up colon ca  prog--critical in CTU                PT-to continue    Eulalio Allison MD-Pulmonary   [phone]

## 2022-10-25 LAB
ALBUMIN SERPL ELPH-MCNC: 3.6 G/DL — SIGNIFICANT CHANGE UP (ref 3.3–5)
ALP SERPL-CCNC: 123 U/L — HIGH (ref 40–120)
ALT FLD-CCNC: 21 U/L — SIGNIFICANT CHANGE UP (ref 10–45)
ANION GAP SERPL CALC-SCNC: 12 MMOL/L — SIGNIFICANT CHANGE UP (ref 5–17)
APPEARANCE UR: ABNORMAL
AST SERPL-CCNC: 20 U/L — SIGNIFICANT CHANGE UP (ref 10–40)
BACTERIA # UR AUTO: NEGATIVE — SIGNIFICANT CHANGE UP
BASE EXCESS BLDV CALC-SCNC: 8.2 MMOL/L — HIGH (ref -2–3)
BILIRUB SERPL-MCNC: 0.2 MG/DL — SIGNIFICANT CHANGE UP (ref 0.2–1.2)
BILIRUB UR-MCNC: NEGATIVE — SIGNIFICANT CHANGE UP
BUN SERPL-MCNC: 22 MG/DL — SIGNIFICANT CHANGE UP (ref 7–23)
CALCIUM SERPL-MCNC: 9.7 MG/DL — SIGNIFICANT CHANGE UP (ref 8.4–10.5)
CHLORIDE SERPL-SCNC: 102 MMOL/L — SIGNIFICANT CHANGE UP (ref 96–108)
CO2 BLDV-SCNC: 36 MMOL/L — HIGH (ref 22–26)
CO2 SERPL-SCNC: 29 MMOL/L — SIGNIFICANT CHANGE UP (ref 22–31)
COLOR SPEC: SIGNIFICANT CHANGE UP
COMMENT - URINE: SIGNIFICANT CHANGE UP
CREAT SERPL-MCNC: 0.47 MG/DL — LOW (ref 0.5–1.3)
DIFF PNL FLD: NEGATIVE — SIGNIFICANT CHANGE UP
EGFR: 100 ML/MIN/1.73M2 — SIGNIFICANT CHANGE UP
EPI CELLS # UR: 0 /HPF — SIGNIFICANT CHANGE UP
GAS PNL BLDA: SIGNIFICANT CHANGE UP
GAS PNL BLDV: SIGNIFICANT CHANGE UP
GLUCOSE BLDC GLUCOMTR-MCNC: 107 MG/DL — HIGH (ref 70–99)
GLUCOSE BLDC GLUCOMTR-MCNC: 160 MG/DL — HIGH (ref 70–99)
GLUCOSE BLDC GLUCOMTR-MCNC: 183 MG/DL — HIGH (ref 70–99)
GLUCOSE BLDC GLUCOMTR-MCNC: 189 MG/DL — HIGH (ref 70–99)
GLUCOSE SERPL-MCNC: 154 MG/DL — HIGH (ref 70–99)
GLUCOSE UR QL: NEGATIVE — SIGNIFICANT CHANGE UP
HCO3 BLDV-SCNC: 34 MMOL/L — HIGH (ref 22–29)
HCT VFR BLD CALC: 31.9 % — LOW (ref 34.5–45)
HGB BLD-MCNC: 9.4 G/DL — LOW (ref 11.5–15.5)
HOROWITZ INDEX BLDV+IHG-RTO: 40 — SIGNIFICANT CHANGE UP
HYALINE CASTS # UR AUTO: 0 /LPF — SIGNIFICANT CHANGE UP (ref 0–7)
KETONES UR-MCNC: NEGATIVE — SIGNIFICANT CHANGE UP
LEUKOCYTE ESTERASE UR-ACNC: NEGATIVE — SIGNIFICANT CHANGE UP
MAGNESIUM SERPL-MCNC: 2 MG/DL — SIGNIFICANT CHANGE UP (ref 1.6–2.6)
MCHC RBC-ENTMCNC: 23.5 PG — LOW (ref 27–34)
MCHC RBC-ENTMCNC: 29.5 GM/DL — LOW (ref 32–36)
MCV RBC AUTO: 79.8 FL — LOW (ref 80–100)
NITRITE UR-MCNC: NEGATIVE — SIGNIFICANT CHANGE UP
NRBC # BLD: 0 /100 WBCS — SIGNIFICANT CHANGE UP (ref 0–0)
PCO2 BLDV: 53 MMHG — HIGH (ref 39–42)
PH BLDV: 7.42 — SIGNIFICANT CHANGE UP (ref 7.32–7.43)
PH UR: 7 — SIGNIFICANT CHANGE UP (ref 5–8)
PHOSPHATE SERPL-MCNC: 3.1 MG/DL — SIGNIFICANT CHANGE UP (ref 2.5–4.5)
PLATELET # BLD AUTO: 322 K/UL — SIGNIFICANT CHANGE UP (ref 150–400)
PO2 BLDV: 49 MMHG — HIGH (ref 25–45)
POTASSIUM SERPL-MCNC: 4.2 MMOL/L — SIGNIFICANT CHANGE UP (ref 3.5–5.3)
POTASSIUM SERPL-SCNC: 4.2 MMOL/L — SIGNIFICANT CHANGE UP (ref 3.5–5.3)
PROT SERPL-MCNC: 7.4 G/DL — SIGNIFICANT CHANGE UP (ref 6–8.3)
PROT UR-MCNC: SIGNIFICANT CHANGE UP
RBC # BLD: 4 M/UL — SIGNIFICANT CHANGE UP (ref 3.8–5.2)
RBC # FLD: 22.4 % — HIGH (ref 10.3–14.5)
RBC CASTS # UR COMP ASSIST: 1 /HPF — SIGNIFICANT CHANGE UP (ref 0–4)
SAO2 % BLDV: 78.4 % — SIGNIFICANT CHANGE UP (ref 67–88)
SODIUM SERPL-SCNC: 143 MMOL/L — SIGNIFICANT CHANGE UP (ref 135–145)
SP GR SPEC: 1.01 — SIGNIFICANT CHANGE UP (ref 1.01–1.02)
UROBILINOGEN FLD QL: NEGATIVE — SIGNIFICANT CHANGE UP
VANCOMYCIN TROUGH SERPL-MCNC: 16.1 UG/ML — SIGNIFICANT CHANGE UP (ref 10–20)
WBC # BLD: 11.68 K/UL — HIGH (ref 3.8–10.5)
WBC # FLD AUTO: 11.68 K/UL — HIGH (ref 3.8–10.5)
WBC UR QL: 1 /HPF — SIGNIFICANT CHANGE UP (ref 0–5)

## 2022-10-25 PROCEDURE — 99232 SBSQ HOSP IP/OBS MODERATE 35: CPT

## 2022-10-25 PROCEDURE — 99291 CRITICAL CARE FIRST HOUR: CPT | Mod: 24

## 2022-10-25 PROCEDURE — 71045 X-RAY EXAM CHEST 1 VIEW: CPT | Mod: 26

## 2022-10-25 RX ORDER — ASCORBIC ACID 60 MG
500 TABLET,CHEWABLE ORAL DAILY
Refills: 0 | Status: DISCONTINUED | OUTPATIENT
Start: 2022-10-25 | End: 2022-11-09

## 2022-10-25 RX ORDER — MEROPENEM 1 G/30ML
1000 INJECTION INTRAVENOUS ONCE
Refills: 0 | Status: COMPLETED | OUTPATIENT
Start: 2022-10-25 | End: 2022-10-25

## 2022-10-25 RX ORDER — HUMAN INSULIN 100 [IU]/ML
10 INJECTION, SUSPENSION SUBCUTANEOUS EVERY 6 HOURS
Refills: 0 | Status: DISCONTINUED | OUTPATIENT
Start: 2022-10-25 | End: 2022-10-27

## 2022-10-25 RX ORDER — HYDRALAZINE HCL 50 MG
50 TABLET ORAL EVERY 8 HOURS
Refills: 0 | Status: DISCONTINUED | OUTPATIENT
Start: 2022-10-25 | End: 2022-11-09

## 2022-10-25 RX ORDER — HUMAN INSULIN 100 [IU]/ML
5 INJECTION, SUSPENSION SUBCUTANEOUS ONCE
Refills: 0 | Status: COMPLETED | OUTPATIENT
Start: 2022-10-25 | End: 2022-10-25

## 2022-10-25 RX ORDER — POTASSIUM CHLORIDE 20 MEQ
20 PACKET (EA) ORAL ONCE
Refills: 0 | Status: COMPLETED | OUTPATIENT
Start: 2022-10-25 | End: 2022-10-25

## 2022-10-25 RX ORDER — HYDRALAZINE HCL 50 MG
5 TABLET ORAL ONCE
Refills: 0 | Status: COMPLETED | OUTPATIENT
Start: 2022-10-25 | End: 2022-10-25

## 2022-10-25 RX ORDER — MEROPENEM 1 G/30ML
1000 INJECTION INTRAVENOUS EVERY 8 HOURS
Refills: 0 | Status: DISCONTINUED | OUTPATIENT
Start: 2022-10-25 | End: 2022-11-02

## 2022-10-25 RX ORDER — CHLORHEXIDINE GLUCONATE 213 G/1000ML
15 SOLUTION TOPICAL EVERY 12 HOURS
Refills: 0 | Status: DISCONTINUED | OUTPATIENT
Start: 2022-10-25 | End: 2022-10-29

## 2022-10-25 RX ORDER — MEROPENEM 1 G/30ML
INJECTION INTRAVENOUS
Refills: 0 | Status: DISCONTINUED | OUTPATIENT
Start: 2022-10-25 | End: 2022-11-02

## 2022-10-25 RX ADMIN — CHLORHEXIDINE GLUCONATE 15 MILLILITER(S): 213 SOLUTION TOPICAL at 06:39

## 2022-10-25 RX ADMIN — MEROPENEM 100 MILLIGRAM(S): 1 INJECTION INTRAVENOUS at 04:23

## 2022-10-25 RX ADMIN — Medication 4 MILLILITER(S): at 05:41

## 2022-10-25 RX ADMIN — HUMAN INSULIN 10 UNIT(S): 100 INJECTION, SUSPENSION SUBCUTANEOUS at 17:57

## 2022-10-25 RX ADMIN — Medication 250 MILLIGRAM(S): at 09:00

## 2022-10-25 RX ADMIN — Medication 1 APPLICATION(S): at 18:17

## 2022-10-25 RX ADMIN — Medication 12.5 MILLIGRAM(S): at 17:49

## 2022-10-25 RX ADMIN — Medication 5 MILLIGRAM(S): at 06:35

## 2022-10-25 RX ADMIN — Medication 2: at 00:45

## 2022-10-25 RX ADMIN — Medication 1 DROP(S): at 17:57

## 2022-10-25 RX ADMIN — MEROPENEM 100 MILLIGRAM(S): 1 INJECTION INTRAVENOUS at 21:22

## 2022-10-25 RX ADMIN — FLUCONAZOLE 150 MILLIGRAM(S): 150 TABLET ORAL at 23:37

## 2022-10-25 RX ADMIN — Medication 5 MILLIGRAM(S): at 00:36

## 2022-10-25 RX ADMIN — MEXILETINE HYDROCHLORIDE 200 MILLIGRAM(S): 150 CAPSULE ORAL at 21:23

## 2022-10-25 RX ADMIN — Medication 5 MILLIGRAM(S): at 13:05

## 2022-10-25 RX ADMIN — DORZOLAMIDE HYDROCHLORIDE 1 DROP(S): 20 SOLUTION/ DROPS OPHTHALMIC at 13:05

## 2022-10-25 RX ADMIN — Medication 2: at 12:45

## 2022-10-25 RX ADMIN — Medication 2: at 17:56

## 2022-10-25 RX ADMIN — DORZOLAMIDE HYDROCHLORIDE 1 DROP(S): 20 SOLUTION/ DROPS OPHTHALMIC at 06:36

## 2022-10-25 RX ADMIN — HUMAN INSULIN 10 UNIT(S): 100 INJECTION, SUSPENSION SUBCUTANEOUS at 12:45

## 2022-10-25 RX ADMIN — Medication 1 DROP(S): at 06:00

## 2022-10-25 RX ADMIN — Medication 40 MILLIGRAM(S): at 06:38

## 2022-10-25 RX ADMIN — CHLORHEXIDINE GLUCONATE 1 APPLICATION(S): 213 SOLUTION TOPICAL at 06:00

## 2022-10-25 RX ADMIN — HUMAN INSULIN 10 UNIT(S): 100 INJECTION, SUSPENSION SUBCUTANEOUS at 00:44

## 2022-10-25 RX ADMIN — MAGNESIUM OXIDE 400 MG ORAL TABLET 400 MILLIGRAM(S): 241.3 TABLET ORAL at 12:58

## 2022-10-25 RX ADMIN — Medication 50 MILLIGRAM(S): at 06:35

## 2022-10-25 RX ADMIN — MEXILETINE HYDROCHLORIDE 200 MILLIGRAM(S): 150 CAPSULE ORAL at 13:11

## 2022-10-25 RX ADMIN — Medication 0.5 MILLIGRAM(S): at 17:48

## 2022-10-25 RX ADMIN — MAGNESIUM OXIDE 400 MG ORAL TABLET 400 MILLIGRAM(S): 241.3 TABLET ORAL at 21:24

## 2022-10-25 RX ADMIN — Medication 12.5 MILLIGRAM(S): at 06:36

## 2022-10-25 RX ADMIN — Medication 250 MILLIGRAM(S): at 21:23

## 2022-10-25 RX ADMIN — Medication 0.5 MILLIGRAM(S): at 05:41

## 2022-10-25 RX ADMIN — APIXABAN 5 MILLIGRAM(S): 2.5 TABLET, FILM COATED ORAL at 07:17

## 2022-10-25 RX ADMIN — DORZOLAMIDE HYDROCHLORIDE 1 DROP(S): 20 SOLUTION/ DROPS OPHTHALMIC at 21:24

## 2022-10-25 RX ADMIN — Medication 3 MILLILITER(S): at 23:48

## 2022-10-25 RX ADMIN — HUMAN INSULIN 5 UNIT(S): 100 INJECTION, SUSPENSION SUBCUTANEOUS at 06:40

## 2022-10-25 RX ADMIN — APIXABAN 5 MILLIGRAM(S): 2.5 TABLET, FILM COATED ORAL at 17:49

## 2022-10-25 RX ADMIN — Medication 50 MILLIGRAM(S): at 12:56

## 2022-10-25 RX ADMIN — MEROPENEM 100 MILLIGRAM(S): 1 INJECTION INTRAVENOUS at 13:05

## 2022-10-25 RX ADMIN — Medication 3 MILLILITER(S): at 17:48

## 2022-10-25 RX ADMIN — PANTOPRAZOLE SODIUM 40 MILLIGRAM(S): 20 TABLET, DELAYED RELEASE ORAL at 12:23

## 2022-10-25 RX ADMIN — Medication 3 MILLILITER(S): at 05:41

## 2022-10-25 RX ADMIN — Medication 20 MILLIEQUIVALENT(S): at 12:26

## 2022-10-25 RX ADMIN — Medication 3 MILLILITER(S): at 11:53

## 2022-10-25 RX ADMIN — Medication 5 MILLIGRAM(S): at 21:23

## 2022-10-25 RX ADMIN — MAGNESIUM OXIDE 400 MG ORAL TABLET 400 MILLIGRAM(S): 241.3 TABLET ORAL at 06:35

## 2022-10-25 RX ADMIN — Medication 4 MILLILITER(S): at 17:49

## 2022-10-25 RX ADMIN — Medication 300 MILLIGRAM(S): at 05:43

## 2022-10-25 RX ADMIN — Medication 8 MILLIGRAM(S): at 07:18

## 2022-10-25 RX ADMIN — Medication 1 TABLET(S): at 12:23

## 2022-10-25 RX ADMIN — MEXILETINE HYDROCHLORIDE 200 MILLIGRAM(S): 150 CAPSULE ORAL at 06:34

## 2022-10-25 RX ADMIN — CHLORHEXIDINE GLUCONATE 15 MILLILITER(S): 213 SOLUTION TOPICAL at 17:49

## 2022-10-25 RX ADMIN — Medication 50 MILLIGRAM(S): at 21:24

## 2022-10-25 NOTE — PROGRESS NOTE ADULT - TIME BILLING
as above: returned ot vent for secretions (she will always have them)-s/p  trach 10/10-s/p  resp failure-sepsis shzzxgujxb-SNRU-on ABX-stable CV status-should use VEST rx if ok w/ CTS  multifactorial dyspnea-resp failure-severe persistent asthma, TBM s/p tracheoplasty, s/p Aortic aneurysm repair, Bronchitis (proteus)-O2-keep 90%;TC  severe persistent asthma--medrol 8mg, singulair 10, duoneb q 6, budes .5 bid, tezspire 8/29-was due 9/29  TBM-s/p tracheoplasty--accapella, unable to use vest due to surgery--can add mucomyst here 2 cc 10% q 8 (secretions)  s/p Aortic aneurysm repair--as per CTS staff care  AF-on heparin--eventual eliquis rx               CV-improved cardene-MAP above 60  DVT R-IJ-on heparin rx  ID-s/p proteus bronchitis-s/p meropenum changed to ceftriaxone and back to meropenem/vanco- off of ABX as of 9/19--restart of ABX 9/27-meropenem/vanco-NOW on meropenem and vanco for MRSE sepsis (11/26 end date for vanco), plastics/ortho for elbow-proteus  PT-OOB as able                             GI-TF as able--f/up LFTs       VC dysfunction--aspiration precautions-s/p trach 10/10  GOC                                    Heme onc f/up colon ca  prog--critical in CTU                PT-to continue-more OOB    Eulalio Allison MD-Pulmonary   [phone]

## 2022-10-25 NOTE — PROGRESS NOTE ADULT - SUBJECTIVE AND OBJECTIVE BOX
DIABETES FOLLOW UP NOTE: Saw pt earlier today    Chief Complaint: Endocrine consult requested for management of T2DM    INTERVAL HX: Saw pt in CTU, able to nod yes/no to questions. BG levels at goal today. However, only received NPH 5 units at 6am with f/u BG of 189. Priam[facility] team changed NPH order to 10 units q 6h after that. No hypoglycemia and no BG <100s today. Per staff tolerating TFs of Glucerna at 65cc/hr. No further emesis. No hypoglycemia.  Remains on antibiotics for sepsis       Review of Systems:  General: As above  Cardiovascular: No chest pain, palpitations  Respiratory: No SOB, no cough  GI: No nausea, vomiting, abdominal pain  Endocrine: no polyuria, polydipsia or S&Sx of hypoglycemia    Allergies    aspirin (Short breath)  Avelox (Short breath; Pruritus)  cefepime (Anaphylaxis)  codeine (Short breath)  Dilaudid (Short breath)  iodine (Short breath; Swelling)  penicillin (Anaphylaxis)  shellfish (Anaphylaxis)  tetanus toxoid (Short breath)  Valium (Short breath)    Intolerances      MEDICATIONS:  fluconAZOLE IVPB 600 milliGRAM(s) IV Intermittent every 24 hours  insulin lispro (ADMELOG) corrective regimen sliding scale   SubCutaneous every 6 hours  insulin NPH human recombinant 10 Unit(s) SubCutaneous every 6 hours  meropenem  IVPB 1000 milliGRAM(s) IV Intermittent every 8 hours  methylPREDNISolone 8 milliGRAM(s) Oral daily  vancomycin  IVPB 750 milliGRAM(s) IV Intermittent every 12 hours         PHYSICAL EXAM:  VITALS: T(C): 36.2 (10-25-22 @ 11:00)  T(F): 97.1 (10-25-22 @ 11:00), Max: 98.6 (10-24-22 @ 20:00)  HR: 83 (10-25-22 @ 15:20) (66 - 88)  BP: --  RR:  (15 - 27)  SpO2:  (93% - 100%)  Wt(kg): --  GENERAL: Female sitting in chair in NAD.  HEENT: Trach in place, NGT with TFs on at 65cc/hr  Chest: Sternal incision healed  Abdomen: Soft, nontender, non distended  Extremities: Warm, no edema in all 4 exts. RUE wound vac without out put noted  NEURO: Alert and able to nod to questions        LABS:  POCT Blood Glucose.: 189 mg/dL (10-25-22 @ 12:43)  POCT Blood Glucose.: 107 mg/dL (10-25-22 @ 06:03)  POCT Blood Glucose.: 160 mg/dL (10-25-22 @ 00:38)  POCT Blood Glucose.: 195 mg/dL (10-24-22 @ 17:09)  POCT Blood Glucose.: 231 mg/dL (10-24-22 @ 11:59)  POCT Blood Glucose.: 83 mg/dL (10-24-22 @ 06:35)  POCT Blood Glucose.: 84 mg/dL (10-23-22 @ 23:58)  POCT Blood Glucose.: 201 mg/dL (10-23-22 @ 17:04)  POCT Blood Glucose.: 207 mg/dL (10-23-22 @ 11:24)  POCT Blood Glucose.: 78 mg/dL (10-23-22 @ 05:49)  POCT Blood Glucose.: 196 mg/dL (10-22-22 @ 23:34)  POCT Blood Glucose.: 211 mg/dL (10-22-22 @ 18:23)                            9.4    11.68 )-----------( 322      ( 25 Oct 2022 00:38 )             31.9       10-25    143  |  102  |  22  ----------------------------<  154<H>  4.2   |  29  |  0.47<L>    eGFR: 100    Ca    9.7      10-25  Mg     2.0     10-25  Phos  3.1     10-25    TPro  7.4  /  Alb  3.6  /  TBili  0.2  /  DBili  x   /  AST  20  /  ALT  21  /  AlkPhos  123<H>  10-25    Thyroid Function Tests:  10-03 @ 04:47 TSH 0.32 FreeT4 -- T3 -- Anti TPO -- Anti Thyroglobulin Ab -- TSI --      A1C with Estimated Average Glucose Result: 9.4 % (09-06-22 @ 16:46)      Estimated Average Glucose: 223 mg/dL (09-06-22 @ 16:46)

## 2022-10-25 NOTE — PROGRESS NOTE ADULT - SUBJECTIVE AND OBJECTIVE BOX
Patient seen and examined at the bedside.    Remained critically ill on continuous ICU monitoring.    OBJECTIVE:  Vital Signs Last 24 Hrs  T(C): 36.9 (25 Oct 2022 04:00), Max: 37 (24 Oct 2022 20:00)  T(F): 98.4 (25 Oct 2022 04:00), Max: 98.6 (24 Oct 2022 20:00)  HR: 71 (25 Oct 2022 07:00) (66 - 88)  BP: --  BP(mean): --  RR: 25 (25 Oct 2022 07:00) (21 - 30)  SpO2: 94% (25 Oct 2022 07:00) (92% - 100%)    Parameters below as of 25 Oct 2022 05:45  Patient On (Oxygen Delivery Method): tracheostomy collar  O2 Flow (L/min): 10  O2 Concentration (%): 40      Physical Exam:   General: OOBTC, NAD, generalized weakness  Neurology: interactive, able to follow simple commands, denies pain  ENT/Neck: + trach c/d/i, neck supple, trachea midline   Respiratory: coarse BS b/l, rhonchi throughout, minimal secretions  CV: S1S2, no murmurs        [x]Sinus Rhythm (pAF)  Abdominal: Soft, NT, ND, colostomy in place (stoma intact)  Extremities: +4 RUE edema, 3+LUE edema, trace edema noted, + peripheral pulses   Skin: Dry dressing over right heel, R elbow wound w/ overlying cling dressing c/d/i                         Assessment:  71F PMH DM, COPD, chronic adrenal insufficiency on Prednisone, history of colorectal cancer s/p resection (colostomy bag), Hx of CAD, chronic AF on Eliquis, and tracheomalacia s/p multiple intubations, recent dx of R foot OM s/p debridement on antibiotics and presented to ED at [facility] this morning with epigastric pain, belching and central chest pain. Found on CTA to have type A aortic dissection and transferred to [facility] for surgical evaluation by Dr. Cabrera.     Ascending aortic dissection s/p type A dissection repair and Biobentall on 9/7   Respiratory failure s/p Trach 10/10/22  Hypovolemia   Post op respiratory failure   Acute blood loss anemia  Stress hyperglycemia   RIJ thrombus  MRSA PNA        Plan:   ***Neuro***  follows simple commands, continue to monitor  PT/OT progress as tolerated, standing exercises       ***Cardiovascular***  Limited TTE on 10/1: EF 69%, Hyperdynamic left ventricular systolic function. There is hypokinesis of the mid-base inferior wall. Nml RV fxn.   CT chest on 9/28: No CT evidence of pulmonary embolism. Status post repair of ascending aortic dissection with a stable dissection flap originating from the aortic arch and extending into the infrarenal abdominal aorta,  B/l UE duplex on 10/5: As before, there is an occluded RIGHT IJ vein with thrombosis extending to brachiocephalic vein. Superficial thrombophlebitis of the LEFT cephalic vein.   Invasive hemodynamic monitoring, assess perfusion indices   SR / MAP 71/ Hct 31.9/ Lactate 1.4  [x] Hydralazine PO for BP management up-titrated to 37.5 TID  Rate control with Mexiletine and Lopressor (pAF)  [x] AC Therapy with Eliquis 5 BID for Afib and IJ thrombus  Reassessment of hemodynamics      ***Pulmonary***  Respiratory failure s/p Trach #8 portex  10/10/22, per ENT inner cannula needs to be changed daily, trach sutures d/c'ed by ENT 10/17   Post op vent management   Titration of FiO2, follow SpO2, CXR, blood gasses   Bronched 10/13  Continue with Trach Collar trials continuous TC since 10/18 @6am  Secretions improved DC'ed mucomyst, continue duonebs  inhaled tobra added 10/21 for increase secretions (for 5d, to complete 10/26), bag and suction prn    Mode: CPAP with PS  FiO2: 40  PEEP: 5  PS: 10  MAP: 9  PIP: 16              ***GI***  [x] Diet:  TFs Glucerna 1.2 @ goal 65ml/hr, tolerating without issues (when glucerna 1.5 back in stock will switch and decrease goal rate to 55ml/hr)  [x] Protonix    Reglan for gut motility        ***Renal***  Continue to monitor I/Os, BUN/Creatinine.   Replete lytes PRN  Diuresis with Lasix 40 PO/D, pt euvolemic       ***ID***  SCx on 10/4+Methicillin resistant Staphylococcus aureus, c/w IVPB Vancomycin, (plan for 6 week course in setting of valve surgery, 11/26 end date)  9/27 blood culture Neela Glabarata, on flucanazole (lifelong suppression)  BCx 10/13 NGTD, BC 10/21 and SCx NGTD  R elbow wound, S/p IR for elbow drainage 10/13 + Few Proteus mirabilis ESBL, Meropenem 7 day trial completed 10/19-> reordered 10/20 for reaccumulation of elbow brusitis--> now continuing. Ortho reconsulted and plastic surgery  10/22 plastics debrided R elbow eschar to subc tissue, ~8cc fluid aspirated and sent for culture. Wet to dry dressing changed 2x daily and plan for PT to place vac today  Joint Fluid Cx 10/22 NTD  Kaz for inhalation (completes 10/26)  Meropenum for 8 day course (until 10/28),started in setting of elbow collection 10/22, f/u ID for possible shorter course given culture NTD        ***Endocrine***  [x]  DM : HbA1c 9.4%                - [x] ISS  [x] NPH             - Need tight glycemic control to prevent wound infection.  Endocrine following, appreciate recommendations        Patient requires continuous monitoring with bedside rhythm monitoring, pulse oximetry monitoring, and continuous central venous and arterial pressure monitoring; and intermittent blood gas analysis. Care plan discussed with the ICU care team.   Patient remained critical, at risk for life threatening decompensation.    I have spent 30 minutes providing critical care management to this patient.    By signing my name below, I, Kieran Plascencia, attest that this documentation has been prepared under the direction and in the presence of KIANA Issa   Electronically signed: Georgi Cordero, 10-25-22 @ 08:10    I, KIANA Issa, personally performed the services described in this documentation. all medical record entries made by the marcyibronaldo were at my direction and in my presence. I have reviewed the chart and agree that the record reflects my personal performance and is accurate and complete  Electronically signed: KIANA Issa  Patient seen and examined at the bedside.    Remained critically ill on continuous ICU monitoring.    OBJECTIVE:  Vital Signs Last 24 Hrs  T(C): 36.9 (25 Oct 2022 04:00), Max: 37 (24 Oct 2022 20:00)  T(F): 98.4 (25 Oct 2022 04:00), Max: 98.6 (24 Oct 2022 20:00)  HR: 71 (25 Oct 2022 07:00) (66 - 88)  BP: --  BP(mean): --  RR: 25 (25 Oct 2022 07:00) (21 - 30)  SpO2: 94% (25 Oct 2022 07:00) (92% - 100%)    Parameters below as of 25 Oct 2022 05:45  Patient On (Oxygen Delivery Method): tracheostomy collar  O2 Flow (L/min): 10  O2 Concentration (%): 40      Physical Exam:   General: OOBTC, NAD, generalized weakness  Neurology: interactive, able to follow simple commands, denies pain  ENT/Neck: + trach c/d/i, neck supple, trachea midline   Respiratory: coarse BS b/l, rhonchi throughout, minimal secretions  CV: S1S2, no murmurs        [x]Sinus Rhythm (pAF)  Abdominal: Soft, NT, ND, colostomy in place (stoma intact)  Extremities: +4 RUE edema, 3+LUE edema, trace edema noted, + peripheral pulses   Skin: Dry dressing over right heel, R elbow wound w/ overlying cling dressing c/d/i                         Assessment:  71F PMH DM, COPD, chronic adrenal insufficiency on Prednisone, history of colorectal cancer s/p resection (colostomy bag), Hx of CAD, chronic AF on Eliquis, and tracheomalacia s/p multiple intubations, recent dx of R foot OM s/p debridement on antibiotics and presented to ED at [facility] this morning with epigastric pain, belching and central chest pain. Found on CTA to have type A aortic dissection and transferred to [facility] for surgical evaluation by Dr. Cabrera.     Ascending aortic dissection s/p type A dissection repair and Biobentall on 9/7   Respiratory failure s/p Trach 10/10/22  Hypovolemia   Post op respiratory failure   Acute blood loss anemia  Stress hyperglycemia   RIJ thrombus  MRSA PNA        Plan:   ***Neuro***  follows simple commands, continue to monitor  PT/OT progress as tolerated, standing exercises         ***Cardiovascular***  Limited TTE on 10/1: EF 69%, Hyperdynamic left ventricular systolic function. There is hypokinesis of the mid-base inferior wall. Nml RV fxn.   CT chest on 9/28: No CT evidence of pulmonary embolism. Status post repair of ascending aortic dissection with a stable dissection flap originating from the aortic arch and extending into the infrarenal abdominal aorta,  B/l UE duplex on 10/5: As before, there is an occluded RIGHT IJ vein with thrombosis extending to brachiocephalic vein. Superficial thrombophlebitis of the LEFT cephalic vein.   Invasive hemodynamic monitoring, assess perfusion indices   SR / MAP 71/ Hct 31.9/ Lactate 1.4  [x] Hydralazine PO for BP management up-titrated to 37.5 TID  Rate control with Mexiletine and Lopressor (pAF)  [x] AC Therapy with Eliquis 5 BID for Afib and IJ thrombus  Reassessment of hemodynamics      ***Pulmonary***  Respiratory failure s/p Trach #8 portex  10/10/22, per ENT inner cannula needs to be changed daily, trach sutures d/c'ed by ENT 10/17   Post op vent management   Titration of FiO2, follow SpO2, CXR, blood gasses   Bronched 10/13  Continue with Trach Collar trials continuous TC since 10/18 @6am  Secretions improved DC'ed mucomyst, continue duonebs  inhaled tobra added 10/21 for increase secretions- D/c today     Mode: CPAP with PS  FiO2: 40  PEEP: 5  PS: 10  MAP: 9  PIP: 16              ***GI***  [x] Diet:  TFs Glucerna 1.2 @ goal 65ml/hr, tolerating without issues (when glucerna 1.5 back in stock will switch and decrease goal rate to 55ml/hr)  [x] Protonix    Reglan for gut motility        ***Renal***  Continue to monitor I/Os, BUN/Creatinine.   Replete lytes PRN  Diuresis with Lasix 40 PO/D, pt euvolemic   Plan to put ramirez in       ***ID***  SCx on 10/4+Methicillin resistant Staphylococcus aureus, c/w IVPB Vancomycin, (plan for 6 week course in setting of valve surgery, 11/26 end date)  9/27 blood culture Neela Glabarata, on flucanazole (lifelong suppression)  BCx 10/13 NGTD, BC 10/21 and SCx NGTD  R elbow wound, S/p IR for elbow drainage 10/13 + Few Proteus mirabilis ESBL, Meropenem 7 day trial completed 10/19-> reordered 10/20 for reaccumulation of elbow brusitis--> now continuing. Ortho reconsulted and plastic surgery  10/22 plastics debrided R elbow eschar to subc tissue, ~8cc fluid aspirated and sent for culture. Wet to dry dressing changed 2x daily and plan for PT to place vac today  Joint Fluid Cx 10/22 NTD  Kaz for inhalation (completes 10/26)  Meropenum for 8 day course (until 10/28),started in setting of elbow collection 10/22, f/u ID for possible shorter course given culture NTD        ***Endocrine***  [x]  DM : HbA1c 9.4%                - [x] ISS  [x] NPH             - Need tight glycemic control to prevent wound infection.  Endocrine following, appreciate recommendations        Patient requires continuous monitoring with bedside rhythm monitoring, pulse oximetry monitoring, and continuous central venous and arterial pressure monitoring; and intermittent blood gas analysis. Care plan discussed with the ICU care team.   Patient remained critical, at risk for life threatening decompensation.    I have spent 30 minutes providing critical care management to this patient.    By signing my name below, I, Kieran Plascencia, attest that this documentation has been prepared under the direction and in the presence of KIANA Issa   Electronically signed: Georgi Cordero, 10-25-22 @ 08:10    I, KIANA Issa, personally performed the services described in this documentation. all medical record entries made by the marcyibe were at my direction and in my presence. I have reviewed the chart and agree that the record reflects my personal performance and is accurate and complete  Electronically signed: KIANA Issa  Patient seen and examined at the bedside.    Remained critically ill on continuous ICU monitoring.    OBJECTIVE:  Vital Signs Last 24 Hrs  T(C): 36.9 (25 Oct 2022 04:00), Max: 37 (24 Oct 2022 20:00)  T(F): 98.4 (25 Oct 2022 04:00), Max: 98.6 (24 Oct 2022 20:00)  HR: 71 (25 Oct 2022 07:00) (66 - 88)  BP: 141/42  BP(mean): 69  RR: 25 (25 Oct 2022 07:00) (21 - 30)  SpO2: 94% (25 Oct 2022 07:00) (92% - 100%)    Parameters below as of 25 Oct 2022 05:45  Patient On (Oxygen Delivery Method): tracheostomy collar  O2 Flow (L/min): 10  O2 Concentration (%): 40      Physical Exam:   General: OOBTC, NAD, generalized weakness  Neurology: interactive, able to follow simple commands, denies pain  ENT/Neck: + trach c/d/i, neck supple, trachea midline   Respiratory: coarse BS b/l, rhonchi throughout, minimal secretions  CV: S1S2, no murmurs        [x] Sinus Rhythm (pAF)  Abdominal: Soft, NT, ND, colostomy in place (stoma intact)  Extremities: +4 RUE edema, 3+LUE edema, trace edema noted, + peripheral pulses   Skin: Dry dressing over right heel, R elbow wound w/ overlying cling dressing c/d/i                         Assessment:  71F PMH DM, COPD, chronic adrenal insufficiency on Prednisone, history of colorectal cancer s/p resection (colostomy bag), Hx of CAD, chronic AF on Eliquis, and tracheomalacia s/p multiple intubations, recent dx of R foot OM s/p debridement on antibiotics and presented to ED at [facility] this morning with epigastric pain, belching and central chest pain. Found on CTA to have type A aortic dissection and transferred to [facility] for surgical evaluation by Dr. Cabrera.     Ascending aortic dissection s/p type A dissection repair and Biobentall on 9/7   Respiratory failure s/p Trach 10/10/22  Hypovolemia   Post op respiratory failure   Acute blood loss anemia  Stress hyperglycemia   RIJ thrombus  MRSA PNA        Plan:   ***Neuro***  follows simple commands, continue to monitor  PT/OT progress as tolerated, standing exercises         ***Cardiovascular***  Limited TTE on 10/1: EF 69%, Hyperdynamic left ventricular systolic function. There is hypokinesis of the mid-base inferior wall. Nml RV fxn.   CT chest on 9/28: No CT evidence of pulmonary embolism. Status post repair of ascending aortic dissection with a stable dissection flap originating from the aortic arch and extending into the infrarenal abdominal aorta,  B/l UE duplex on 10/5: As before, there is an occluded RIGHT IJ vein with thrombosis extending to brachiocephalic vein. Superficial thrombophlebitis of the LEFT cephalic vein.   Invasive hemodynamic monitoring, assess perfusion indices   SR / MAP 71/ Hct 31.9/ Lactate 1.4  [x] Hydralazine PO for BP management up-titrated to 37.5 TID  Rate control with Mexiletine and Lopressor (pAF)  [x] AC Therapy with Eliquis 5 BID for Afib and IJ thrombus  Reassessment of hemodynamics      ***Pulmonary***  Respiratory failure s/p Trach #8 portex  10/10/22, per ENT inner cannula needs to be changed daily, trach sutures d/c'ed by ENT 10/17   Post op vent management   Titration of FiO2, follow SpO2, CXR, blood gasses   Bronched 10/13  Continue with Trach Collar trials continuous TC since 10/18 @6am  Secretions improved DC'ed mucomyst, continue duonebs  inhaled tobra added 10/21 for increase secretions- D/c today   Suction as needed    Mode: CPAP with PS  FiO2: 40  PEEP: 5  PS: 10  MAP: 9  PIP: 16              ***GI***  [x] Diet:  TFs Glucerna 1.2 @ goal 65ml/hr, tolerating without issues (when glucerna 1.5 back in stock will switch and decrease goal rate to 55ml/hr)  [x] Protonix    Reglan for gut motility    Added vit C and no carb pro source      ***Renal***  Continue to monitor I/Os, BUN/Creatinine.   Replete lytes PRN  Diuresis with Lasix 40 PO/D, pt euvolemic   Plan to put ramirez in       ***ID***  SCx on 10/4+Methicillin resistant Staphylococcus aureus, c/w IVPB Vancomycin, (plan for 6 week course in setting of valve surgery, 11/26 end date)  9/27 blood culture Neela Glabarata, on flucanazole (lifelong suppression)  BCx 10/13 NGTD, BC 10/21 and SCx NGTD  R elbow wound, S/p IR for elbow drainage 10/13 + Few Proteus mirabilis ESBL, Meropenem 7 day trial completed 10/19-> reordered 10/20 for reaccumulation of elbow brusitis--> now continuing. Ortho reconsulted and plastic surgery  10/22 plastics debrided R elbow eschar to subc tissue, ~8cc fluid aspirated and sent for culture. Wet to dry dressing changed 2x daily and plan for PT to place vac today  Joint Fluid Cx 10/22 NTD  Kaz for inhalation (completes 10/26)  Meropenem for 8 day course (until 10/28),started in setting of elbow collection 10/22, f/u ID for possible shorter course given culture NTD        ***Endocrine***  [x]  DM : HbA1c 9.4%                - [x] ISS  [x] NPH             - Need tight glycemic control to prevent wound infection.  Endocrine following, appreciate recommendations        Patient requires continuous monitoring with bedside rhythm monitoring, pulse oximetry monitoring, and continuous central venous and arterial pressure monitoring; and intermittent blood gas analysis. Care plan discussed with the ICU care team.   Patient remained critical, at risk for life threatening decompensation.    I have spent 35 minutes providing critical care management to this patient.    By signing my name below, I, Kieran Plascencia, attest that this documentation has been prepared under the direction and in the presence of KAINA Issa   Electronically signed: Rogers Cordero, 10-25-22 @ 08:10    I, KIANA Issa, personally performed the services described in this documentation. all medical record entries made by the rogers were at my direction and in my presence. I have reviewed the chart and agree that the record reflects my personal performance and is accurate and complete  Electronically signed: KIANA Issa

## 2022-10-25 NOTE — PROGRESS NOTE ADULT - ASSESSMENT
71F w/h/o uncontrolled T2DM (A1C 9.4%) on basal/bolus insulin PTA. Unknown DM complications. Also COPD, secondary adrenal Insufficiency on chronic steroids, colorectal cancer s/p resection (colostomy bag), Chronic A fib on Eliquis, and tracheomalacia and multiple intubations, recent dx of OM presented to ED at [facility] with epigastric pain, belching and central chest pain. Found on CTA to have type A aortic dissection and transferred to [facility] for surgical evaluation by Dr. Cabrera. Now s/p aortic dissection repair on 9/07/22. Endocrine consulted for assistance with uncontrolled DM/steroids for AI. Pt in ICU with ventricular dysfunction/sepsis, and now s/p trach 10/10 and more recently s/p R elbow eschar debridement 10/22 > Wound VAC 10/24.  Tolerating TFs with BG levels mostly at goal but requiring lower NPH insulin doses. Team decreased NPH doses to 10 units q6h. Will reevaluate this dosing and adjust insulin as needed to keep BG goal 100 [id number].Pt remains with leukocytosis and resolved transaminitis. On Prednisone dose 8mg for AI.

## 2022-10-25 NOTE — PROGRESS NOTE ADULT - SUBJECTIVE AND OBJECTIVE BOX
CHIEF COMPLAINT: f/up sob, chronic resp failure, TBM, severe persistent asthma, VC dysfunction, Type A aortic dissection s/p repair w/modified "Bentall procedure and hemiarch replacement 9/6-vented -awake and alert, no complaints-re placed back on vent for "secretions"    Interval Events: PT OOB ambulated but re-placed on vent for secrtions    REVIEW OF SYSTEMS:  Constitutional: No fevers or chills. No weight loss. No fatigue or generalized malaise.  Eyes: No itching or discharge from the eyes  ENT: No ear pain. No ear discharge. No nasal congestion. No post nasal drip. No epistaxis. No throat pain. No sore throat. No difficulty swallowing.   CV: No chest pain. No palpitations. No lightheadedness or dizziness.   Resp: No dyspnea at rest. No dyspnea on exertion. No orthopnea. No wheezing. No cough. No stridor. No sputum production. No chest pain with respiration.  GI: No nausea. No vomiting. No diarrhea.  MSK: No joint pain or pain in any extremities  Integumentary: No skin lesions. No pedal edema.  Neurological: + gross motor weakness. No sensory changes.  [+ ] All other systems negative  [ ] Unable to assess ROS because ________    OBJECTIVE:  ICU Vital Signs Last 24 Hrs  T(C): 36.9 (25 Oct 2022 04:00), Max: 37 (24 Oct 2022 20:00)  T(F): 98.4 (25 Oct 2022 04:00), Max: 98.6 (24 Oct 2022 20:00)  HR: 72 (25 Oct 2022 04:00) (66 - 88)  BP: --  BP(mean): --  ABP: 140/49 (25 Oct 2022 04:00) (116/40 - 169/59)  ABP(mean): 73 (25 Oct 2022 04:00) (62 - 103)  RR: 22 (25 Oct 2022 04:00) (20 - 30)  SpO2: 96% (25 Oct 2022 04:00) (92% - 100%)    O2 Parameters below as of 25 Oct 2022 04:00  Patient On (Oxygen Delivery Method): ventilator    O2 Concentration (%): 40      Mode: CPAP with PS, FiO2: 40, PEEP: 5, PS: 10, MAP: 9, PIP: 16    10-23 @ 07:01  -  10-24 @ 07:00  --------------------------------------------------------  IN: 2909 mL / OUT: 2350 mL / NET: 559 mL    10-24 @ 07:01  -  10-25 @ 04:48  --------------------------------------------------------  IN: 2125 mL / OUT: 100 mL / NET: 2025 mL      CAPILLARY BLOOD GLUCOSE  160 (25 Oct 2022 00:00)      POCT Blood Glucose.: 160 mg/dL (25 Oct 2022 00:38)      PHYSICAL EXAM: NAD in bed on vent  General: Awake, alert, oriented X 3.   HEENT: Atraumatic, normocephalic.                 Mallampatti Grade 3                No nasal congestion.                No tonsillar or pharyngeal exudates.  Lymph Nodes: No palpable lymphadenopathy  Neck: No JVD. No carotid bruit.   Respiratory: abnormal chest expansion                         Normal percussion                         Normal and equal air entry                         No wheeze, rhonchi or rales.  Cardiovascular: S1 S2 normal. No murmurs, rubs or gallops.   Abdomen: Soft, non-tender, non-distended. No organomegaly. Normoactive bowel sounds.  Extremities: Warm to touch. Peripheral pulse palpable. No pedal edema.   Skin: No rashes or skin lesions  Neurological: Motor and sensory examination equal and normal in all four extremities.  Psychiatry: Appropriate mood and affect.    HOSPITAL MEDICATIONS:  MEDICATIONS  (STANDING):  acetylcysteine 10%  Inhalation 4 milliLiter(s) Inhalation every 12 hours  albuterol/ipratropium for Nebulization 3 milliLiter(s) Nebulizer every 6 hours  apixaban 5 milliGRAM(s) Oral every 12 hours  buDESOnide    Inhalation Suspension 0.5 milliGRAM(s) Inhalation every 12 hours  chlorhexidine 0.12% Liquid 15 milliLiter(s) Oral Mucosa every 12 hours  chlorhexidine 2% Cloths 1 Application(s) Topical <User Schedule>  collagenase Ointment 1 Application(s) Topical daily  dextrose 50% Injectable 50 milliLiter(s) IV Push every 15 minutes  dorzolamide 2% Ophthalmic Solution 1 Drop(s) Left EYE three times a day  fluconAZOLE IVPB 600 milliGRAM(s) IV Intermittent every 24 hours  furosemide    Tablet 40 milliGRAM(s) Oral daily  hydrALAZINE 50 milliGRAM(s) Oral every 8 hours  insulin lispro (ADMELOG) corrective regimen sliding scale   SubCutaneous every 6 hours  insulin NPH human recombinant 20 Unit(s) SubCutaneous <User Schedule>  insulin NPH human recombinant 10 Unit(s) SubCutaneous <User Schedule>  magnesium oxide 400 milliGRAM(s) Oral every 8 hours  meropenem  IVPB      meropenem  IVPB 1000 milliGRAM(s) IV Intermittent once  meropenem  IVPB 1000 milliGRAM(s) IV Intermittent every 8 hours  methylPREDNISolone 8 milliGRAM(s) Oral daily  metoclopramide Injectable 5 milliGRAM(s) IV Push every 8 hours  metoprolol tartrate 12.5 milliGRAM(s) Enteral Tube every 12 hours  mexiletine 200 milliGRAM(s) Oral every 8 hours  multivitamin 1 Tablet(s) Oral daily  pantoprazole  Injectable 40 milliGRAM(s) IV Push daily  sodium chloride 0.9%. 1000 milliLiter(s) (10 mL/Hr) IV Continuous <Continuous>  timolol 0.5% Solution 1 Drop(s) Left EYE two times a day  tobramycin for Nebulization 300 milliGRAM(s) Inhalation every 12 hours  vancomycin  IVPB 750 milliGRAM(s) IV Intermittent every 12 hours  vancomycin  IVPB        MEDICATIONS  (PRN):  acetaminophen    Suspension .. 650 milliGRAM(s) Oral every 6 hours PRN Temp greater or equal to 38C (100.4F), Mild Pain (1 - 3)      LABS:                        9.4    11.68 )-----------( 322      ( 25 Oct 2022 00:38 )             31.9     10-25    143  |  102  |  22  ----------------------------<  154<H>  4.2   |  29  |  0.47<L>    Ca    9.7      25 Oct 2022 00:38  Phos  3.1     10-25  Mg     2.0     10-25    TPro  7.4  /  Alb  3.6  /  TBili  0.2  /  DBili  x   /  AST  20  /  ALT  21  /  AlkPhos  123<H>  10-25        Arterial Blood Gas:  10-25 @ 00:14  7.47/48/72/35/94.7/10.0  ABG lactate: --  Arterial Blood Gas:  10-24 @ 00:00  7.46/51/95/36/98.5/11.1  ABG lactate: --  Arterial Blood Gas:  10-23 @ 19:50  7.46/54/97/38/98.1/12.9  ABG lactate: --  Arterial Blood Gas:  10-23 @ 09:13  7.47/53/75/39/97.1/12.7  ABG lactate: --        MICROBIOLOGY:     RADIOLOGY:  [ ] Reviewed and interpreted by me    Point of Care Ultrasound Findings:    PFT:    EKG:

## 2022-10-25 NOTE — CHART NOTE - NSCHARTNOTEFT_GEN_A_CORE
Nutrition Follow Up Note  Patient seen for: nutrition follow up.    Chart reviewed, events noted. Pt is a 71F w/h/o uncontrolled T2DM (A1C 9.4%) on basal/bolus insulin PTA. Unknown DM complications. Also COPD, secondary adrenal Insufficiency on chronic steroids, colorectal cancer s/p resection (colostomy bag), Chronic A fib on Eliquis, and tracheomalacia and multiple intubations, recent dx of OM presented to ED at [facility] with epigastric pain, belching and central chest pain. Found on CTA to have type A aortic dissection and transferred to [facility] for surgical evaluation by Dr. Cabrera. Now s/p Type A aortic dissection repair.     Source: [] Patient       [x] EMR        [x] RN        [] Family at bedside       [] Other:    -If unable to interview patient: [x] Trach/Vent/BiPAP  [x] Disoriented/confused/inappropriate to interview    Diet, NPO with Tube Feed:   Tube Feeding Modality: Nasogastric  Glucerna 1.2 Chago (GLUCERNARTH)  Total Volume for 24 Hours (mL): 1560  Continuous  Starting Tube Feed Rate {mL per Hour}: 65  Until Goal Tube Feed Rate (mL per Hour): 65  Tube Feed Duration (in Hours): 24  Tube Feed Start Time: 08:20 (10-17-22 @ 08:23)    EN Order Provides: 1560mL, 1872kcal, 94g protein, 1256mL free water.   Current Pump Rate: 65mL/hr  EN provision per flow sheets:    - 5 Day Average: 1420mL or 91% goal volume     Nutrition-related concerns:   - Pt currently tolerating trach collar.   - NPH + SSI for BG management. Endocrinology following. Pt continues on Medrol for adrenal insufficiency.   - IVF: NS @ 10mL/hr    GI: Colostomy output: 500mL (10/25).   Bowel Regimen? [] Yes   [x] No   - Antibiotic regimen noted   - Reglan for gut motility     Weights:   Daily Weight in k.6 (10-), Weight in k.9 (10-), Weight in k.1 (10-), Weight in k.2 (10-), Weight in k.9 (10-20), Weight in k.1 (10-19), Weight in k.5 (10-18), Weight in k.2 (10-17), Weight in k.4 (10-16), Weight in k.5 (10-15), Weight in k.9 (10-14)   - weight fluctuations noted, pt on diuretics, will continue to monitor    MEDICATIONS  (STANDING):  acetylcysteine 10%  Inhalation 4 milliLiter(s) Inhalation every 12 hours  albuterol/ipratropium for Nebulization 3 milliLiter(s) Nebulizer every 6 hours  apixaban 5 milliGRAM(s) Oral every 12 hours  buDESOnide    Inhalation Suspension 0.5 milliGRAM(s) Inhalation every 12 hours  chlorhexidine 0.12% Liquid 15 milliLiter(s) Oral Mucosa every 12 hours  chlorhexidine 2% Cloths 1 Application(s) Topical <User Schedule>  collagenase Ointment 1 Application(s) Topical daily  dextrose 50% Injectable 50 milliLiter(s) IV Push every 15 minutes  dorzolamide 2% Ophthalmic Solution 1 Drop(s) Left EYE three times a day  fluconAZOLE IVPB 600 milliGRAM(s) IV Intermittent every 24 hours  furosemide    Tablet 40 milliGRAM(s) Oral daily  hydrALAZINE 50 milliGRAM(s) Oral every 8 hours  insulin lispro (ADMELOG) corrective regimen sliding scale   SubCutaneous every 6 hours  insulin NPH human recombinant 10 Unit(s) SubCutaneous every 6 hours  magnesium oxide 400 milliGRAM(s) Oral every 8 hours  meropenem  IVPB      meropenem  IVPB 1000 milliGRAM(s) IV Intermittent every 8 hours  methylPREDNISolone 8 milliGRAM(s) Oral daily  metoclopramide Injectable 5 milliGRAM(s) IV Push every 8 hours  metoprolol tartrate 12.5 milliGRAM(s) Enteral Tube every 12 hours  mexiletine 200 milliGRAM(s) Oral every 8 hours  multivitamin 1 Tablet(s) Oral daily  pantoprazole  Injectable 40 milliGRAM(s) IV Push daily  sodium chloride 0.9%. 1000 milliLiter(s) (10 mL/Hr) IV Continuous <Continuous>  timolol 0.5% Solution 1 Drop(s) Left EYE two times a day  vancomycin  IVPB 750 milliGRAM(s) IV Intermittent every 12 hours  vancomycin  IVPB        MEDICATIONS  (PRN):  acetaminophen    Suspension .. 650 milliGRAM(s) Oral every 6 hours PRN Temp greater or equal to 38C (100.4F), Mild Pain (1 - 3)    Pertinent Labs: 10-25 @ 00:38: Na 143, BUN 22, Cr 0.47<L>, <H>, K+ 4.2, Phos 3.1, Mg 2.0, Alk Phos 123<H>, ALT/SGPT 21, AST/SGOT 20, HbA1c --    A1C with Estimated Average Glucose Result: 9.4 % (22 @ 16:46)  A1C with Estimated Average Glucose Result: 9.2 % (22 @ 07:36)  A1C with Estimated Average Glucose Result: 8.0 % (05-10-22 @ 12:26)    Finger Sticks:  POCT Blood Glucose.: 189 mg/dL (25 Oct 2022 12:43)  POCT Blood Glucose.: 107 mg/dL (25 Oct 2022 06:03)  POCT Blood Glucose.: 160 mg/dL (25 Oct 2022 00:38)  POCT Blood Glucose.: 195 mg/dL (24 Oct 2022 17:09)    Skin per nursing documentation: DTI bilateral buttocks  Edema: 2+ generalized, 3+ right arm    Based on UBW 62.3kg  Estimated Energy Needs: 1682 - 1994kcal (27 - 32kcal/kg)   Estimated Protein Needs: 87 - 112g (1.4 - 1.8g/kg)  Estimated Fluid Needs: per team.    Previous Nutrition Diagnosis: Moderate Acute Malnutrition   Nutrition Diagnosis is: [x] ongoing  [] resolved [] not applicable     New Nutrition Diagnosis: n/a    Nutrition Care Plan:  [x] In Progress - being addressed with EN as feasible  [] Achieved  [] Not applicable    Nutrition Interventions:     Education Provided:       [] Yes:  [x] No: N/A    Recommendations:  1. Continue Glucerna 1.2 at current goal rate 65ml/hr x 24 hr, consider addition of No Carb Prosource TF x 1. To provide: 1560ml, 1912kcal and 105g protein, 1256mL free water. Based on UBW (62.3kg): 30.6kcal/kg and ~1.7g protein/kg. Defer additional free water flushes to team.  2. Monitor GI tolerance. RD to remain available to adjust EN formulary, volume/rate PRN.   3. Antihyperglycemic regimen deferred to team.   4. Continue multivitamin as medically feasible, recommend vitamin C to support wound healing.    Monitoring and Evaluation:   Continue to monitor nutritional intake, tolerance to diet prescription, weights, labs, skin integrity    RD remains available upon request and will follow up per protocol    Deedee Rosas MS, RD, CDN, [facility] Pager #[phone]

## 2022-10-25 NOTE — PROGRESS NOTE ADULT - NSPROGADDITIONALINFOA_GEN_ALL_CORE
-Plan discussed with pt/team.  Contact info: [phone] (24/7). pager [phone]  [email or web address] password [email or web address]  Teams  Spent 28 minutes assessing pt/labs/meds and discussing plan of care with primary team  Adjusting insulin  Discharge plan  Follow up care

## 2022-10-25 NOTE — PROGRESS NOTE ADULT - SUBJECTIVE AND OBJECTIVE BOX
INFECTIOUS DISEASES FOLLOW UP-- Fouzia Marily  [phone]    This is a follow up note for this  72yFemale with  Dissection of thoracic aorta        ROS:  CONSTITUTIONAL:  awake, alert sitting in a chair    Allergies    aspirin (Short breath)  Avelox (Short breath; Pruritus)  cefepime (Anaphylaxis)  codeine (Short breath)  Dilaudid (Short breath)  iodine (Short breath; Swelling)  penicillin (Anaphylaxis)  shellfish (Anaphylaxis)  tetanus toxoid (Short breath)  Valium (Short breath)    Intolerances        ANTIBIOTICS/RELEVANT:  antimicrobials  fluconAZOLE IVPB 600 milliGRAM(s) IV Intermittent every 24 hours  meropenem  IVPB      meropenem  IVPB 1000 milliGRAM(s) IV Intermittent every 8 hours  vancomycin  IVPB 750 milliGRAM(s) IV Intermittent every 12 hours  vancomycin  IVPB        immunologic:    OTHER:  acetaminophen    Suspension .. 650 milliGRAM(s) Oral every 6 hours PRN  acetylcysteine 10%  Inhalation 4 milliLiter(s) Inhalation every 12 hours  albuterol/ipratropium for Nebulization 3 milliLiter(s) Nebulizer every 6 hours  apixaban 5 milliGRAM(s) Oral every 12 hours  ascorbic acid 500 milliGRAM(s) Oral daily  buDESOnide    Inhalation Suspension 0.5 milliGRAM(s) Inhalation every 12 hours  chlorhexidine 0.12% Liquid 15 milliLiter(s) Oral Mucosa every 12 hours  chlorhexidine 2% Cloths 1 Application(s) Topical <User Schedule>  collagenase Ointment 1 Application(s) Topical daily  dextrose 50% Injectable 50 milliLiter(s) IV Push every 15 minutes  dorzolamide 2% Ophthalmic Solution 1 Drop(s) Left EYE three times a day  furosemide    Tablet 40 milliGRAM(s) Oral daily  hydrALAZINE 50 milliGRAM(s) Oral every 8 hours  insulin lispro (ADMELOG) corrective regimen sliding scale   SubCutaneous every 6 hours  insulin NPH human recombinant 10 Unit(s) SubCutaneous every 6 hours  magnesium oxide 400 milliGRAM(s) Oral every 8 hours  methylPREDNISolone 8 milliGRAM(s) Oral daily  metoclopramide Injectable 5 milliGRAM(s) IV Push every 8 hours  metoprolol tartrate 12.5 milliGRAM(s) Enteral Tube every 12 hours  mexiletine 200 milliGRAM(s) Oral every 8 hours  multivitamin 1 Tablet(s) Oral daily  pantoprazole  Injectable 40 milliGRAM(s) IV Push daily  sodium chloride 0.9%. 1000 milliLiter(s) IV Continuous <Continuous>  timolol 0.5% Solution 1 Drop(s) Left EYE two times a day      Objective:  Vital Signs Last 24 Hrs  T(C): 36.1 (25 Oct 2022 16:00), Max: 37 (24 Oct 2022 20:00)  T(F): 97 (25 Oct 2022 16:00), Max: 98.6 (24 Oct 2022 20:00)  HR: 83 (25 Oct 2022 18:00) (66 - 86)  BP: --  BP(mean): --  RR: 27 (25 Oct 2022 18:00) (15 - 27)  SpO2: 99% (25 Oct 2022 18:00) (94% - 100%)    Parameters below as of 25 Oct 2022 11:53  Patient On (Oxygen Delivery Method): tracheostomy collar        PHYSICAL EXAM:  Constitutional:no acute distress  Eyes:EBER, EOMI  Ear/Nose/Throat: no oral lesions, trach with trach collar oxygen	  Respiratory: clear BL  Cardiovascular: S1S2  Gastrointestinal:soft, (+) BS, no tenderness ostomy with brown stool  Extremities: right elbow remains with swelling, wound vac  No Lymphadenopathy  IV sites not inflammed.    LABS:                        9.4    11.68 )-----------( 322      ( 25 Oct 2022 00:38 )             31.9     10-    143  |  102  |  22  ----------------------------<  154<H>  4.2   |  29  |  0.47<L>    Ca    9.7      25 Oct 2022 00:38  Phos  3.1     10-  Mg     2.0     10-    TPro  7.4  /  Alb  3.6  /  TBili  0.2  /  DBili  x   /  AST  20  /  ALT  21  /  AlkPhos  123<H>  10-25      Urinalysis Basic - ( 25 Oct 2022 12:20 )    Color: Light Yellow / Appearance: Turbid / S.013 / pH: x  Gluc: x / Ketone: Negative  / Bili: Negative / Urobili: Negative   Blood: x / Protein: Trace / Nitrite: Negative   Leuk Esterase: Negative / RBC: 1 /hpf / WBC 1 /HPF   Sq Epi: x / Non Sq Epi: 0 /hpf / Bacteria: Negative        MICROBIOLOGY:    Culture - Abscess with Gram Stain (10.13.22 @ 18:43)    -  Gentamicin: R >8    -  Levofloxacin: R >4    -  Meropenem: S <=1    -  Piperacillin/Tazobactam: R <=8    -  Tobramycin: R >8    -  Trimethoprim/Sulfamethoxazole: R >2/38    -  Amikacin: S <=16    -  Amoxicillin/Clavulanic Acid: S <=8/4    -  Ampicillin: R >16 These ampicillin results predict results for amoxicillin    -  Ampicillin/Sulbactam: R <=4/2 Enterobacter, Klebsiella aerogenes, Citrobacter, and Serratia may develop resistance during prolonged therapy (3-4 days)    -  Aztreonam: R >16    -  Cefazolin: R >16 Enterobacter, Klebsiella aerogenes, Citrobacter, and Serratia may develop resistance during prolonged therapy (3-4 days)    -  Cefepime: R >16    -  Ceftriaxone: R >32 Enterobacter, Klebsiella aerogenes, Citrobacter, and Serratia may develop resistance during prolonged therapy    -  Ciprofloxacin: R >2    -  Ertapenem: S <=0.5    Specimen Source: .Abscess right elbow collection    Culture Results:   Few Proteus mirabilis ESBL    Organism Identification: Proteus mirabilis ESBL    Organism: Proteus mirabilis ESBL    Method Type: GARRETT            RECENT CULTURES:  10-22 @ 14:18  Joint Fl Arm - Right  Proteus mirabilis ESBL  Proteus mirabilis ESBL  GARRETT    Few Proteus mirabilis ESBL  --  10-21 @ 10:04  .Sputum Sputum trap  --  --  --    Normal Respiratory Jessica present  --  10-21 @ 05:05  .Blood Blood-Peripheral  --  --  --    No growth to date.  --  10-21 @ 05:04  .Blood Blood  --  --  --    No growth to date.  --      RADIOLOGY & ADDITIONAL STUDIES:  < from: Xray Chest 1 View- PORTABLE-Routine (Xray Chest 1 View- PORTABLE-Routine in AM.) (10.25.22 @ 03:30) >  INDINGS:  Tracheostomy is visualized.  There is an enteric tube, coursing below the diaphragm, with its tip out   of the field of view.  Median sternotomy wires and aortic valve replacement are noted.  The heart size cannot be accurately assessed in this projection.  The lungs are clear.  There is no pneumothorax or pleural effusion.  No acute bony abnormality.    IMPRESSION:  Lines and tubes, as above.    < end of copied text >

## 2022-10-25 NOTE — PROGRESS NOTE ADULT - ASSESSMENT
71 year-old female with a history of DM2, CAD, A-Fib on apixaban, Severe Persistent Asthma (on chronic steroids, recently started on Tezspire), colon cancer s/p resection/chemo, and tracheobronchomalacia s/p tracheoplasty, and recent OM of the R foot s/b debridement and completed course of Vancomycin/Ertapenem for MRSA/ESBL Proteus/Corynebacterium who now presents with chest pain, found to have Type A dissection s/p repair with modified Bentall procedure and hemiarch replacement on 22. Post-op course complicated by acute hypoxemic respiratory failure, shock, anemia, and hyperglycemia requiring insulin gtt. Extubated , now awake and alert with mild encephalopathy but overall significantly improved.    Assessment:  Acute hypoxemic respiratory failure requiring intubation  Type A Aortic Dissection  Severe Persistent Asthma  History of Tracheobronchomalacia    Plan:  See below:  -**************************                                SEE Below:  -improving but weakness  9/15-ABX adjusted to ceftriaxone (LFTS)-PICU  -PICU, low grade temp-fever work up in progress, no resp decline or sx  -resp stable at present but tenuous  -for FEESST today, NGT in place w/TF  -s/p FEESST-passed, remains in PICU          -TF in place at 40cc, more OOB          -hypoglycemia noted and rx, no change in resp status  -vented/sedated-pressors/inotropes/ABX  -remains vented on nitrous/less O2 needs, improving LFTS                   -vented/semi sedated--less O2 needs  10/3-on vanco, weaning sedation/, all lines changed  10/6-no changes-now afebrile-on vanco   10/7-no weaning-remains off pressors  10/10-for trach, no weaning done               10/11-s/p trach-uneventful  10/12-TC and ;cpap done and tolerated well  10/13-weaning TC continues              10/14-mucus issues-TC continues  10/24-TC x mult days-stable-secretion issues remain           10/25-replaced on vent for secretions

## 2022-10-25 NOTE — PROGRESS NOTE ADULT - PROBLEM SELECTOR PLAN 1
-test BG q6h if NPO/TF   -C.w NPH 10 units q6h for now. If BG <100 can administer 50% of the dose. HOLD IF TFS OFF  -C/w Admelog moderate correction scale q6h for now  Discharge plan:  - Likely to discharge patient home on basal/bolus insulin. Final regimen pending clinical course.  - Recommend routine outpatient ophthalmology, podiatry  - Can f/u with endocrinologist Dr. Saavedra.  - Make sure pt has Rx for all DM supplies and insulin/ DM meds.  -Based on pt's clinical condition and mental status at this time she is not able to independently manage her DM. Will evaluate pt's ability to manage DM at time of discharge. If unable> pt will need family/ care giver (s) to manage DM care at home  -Will need rehab.

## 2022-10-25 NOTE — PROGRESS NOTE ADULT - ASSESSMENT
71 year-old female with a history of DM2, CAD, A-Fib on apixaban, Severe Persistent Asthma (on chronic steroids, recently started on Tezspire), colon cancer s/p resection/chemo, and tracheobronchomalacia s/p tracheoplasty, and recent OM of the R foot s/b debridement and completed course of Vancomycin/Ertapenem for MRSA/ESBL Proteus/Corynebacterium who now presents with chest pain, found to have Type A dissection s/p repair with modified Bentall procedure and hemiarch replacement on 9/6/22. Post-op course complicated by acute hypoxemic respiratory failure, shock, anemia, and hyperglycemia requiring insulin gtt. Extubated 9/13, now awake and alert with mild encephalopathy but overall significantly improved.    Assessment:  Acute hypoxemic respiratory failure requiring intubation  Type A Aortic Dissection  Severe Persistent Asthma  History of Tracheobronchomalacia  fevers and MSSA bacteremia and tracheal aspirate material with MSSA    -leukocytosis  Fevers and MRSA bacteremia last + culture 9/28  last positive Candida blood culture 9/30  Continue IV vancomycin- trough 16. on 10/25 is therapeutic maintain current dosing   Right elbow growing ESBL Proteus -continue Meropenem and she may need further drainage or a washout to the joint given persistent positive cultures        MRSA  bacteremia and candidemia  Will plan to treat for 4-6 weeks in the setting of post surgical repair of thoracic aorta dissection  Continue to follow CBC  Continue to follow creatinine  Continue to follow Vanco trough levels  repeat blood cultures for evidence of fungemia and bacteremia clearance show evidence of clearing of infections  ESBL proteus in elbow fluid sensitive to meropenem and more recent aspiration 10/22 is again growing Proteus ESBL type- may need a more definitive washout of bursa to eradicate infection      Jeff Calixto MD  Can be called via Teams  After 5pm/weekends [phone]

## 2022-10-25 NOTE — PROGRESS NOTE ADULT - NUTRITIONAL ASSESSMENT
Diet, NPO with Tube Feed:   Tube Feeding Modality: Nasogastric  Glucerna 1.2 Chago (GLUCERNARTH)  Total Volume for 24 Hours (mL): 1560  Continuous  Starting Tube Feed Rate {mL per Hour}: 65  Until Goal Tube Feed Rate (mL per Hour): 65  Tube Feed Duration (in Hours): 24  Tube Feed Start Time: 08:20  No Carb Prosource TF     Qty per Day:  1 (10-25-22 @ 15:15) [Active]      Please see RD assessment and/or follow up.  Managed by primary team as well

## 2022-10-26 LAB
ALBUMIN SERPL ELPH-MCNC: 3.3 G/DL — SIGNIFICANT CHANGE UP (ref 3.3–5)
ALP SERPL-CCNC: 136 U/L — HIGH (ref 40–120)
ALT FLD-CCNC: 22 U/L — SIGNIFICANT CHANGE UP (ref 10–45)
ANION GAP SERPL CALC-SCNC: 12 MMOL/L — SIGNIFICANT CHANGE UP (ref 5–17)
AST SERPL-CCNC: 21 U/L — SIGNIFICANT CHANGE UP (ref 10–40)
BILIRUB SERPL-MCNC: 0.3 MG/DL — SIGNIFICANT CHANGE UP (ref 0.2–1.2)
BLD GP AB SCN SERPL QL: NEGATIVE — SIGNIFICANT CHANGE UP
BUN SERPL-MCNC: 23 MG/DL — SIGNIFICANT CHANGE UP (ref 7–23)
CALCIUM SERPL-MCNC: 9.6 MG/DL — SIGNIFICANT CHANGE UP (ref 8.4–10.5)
CHLORIDE SERPL-SCNC: 104 MMOL/L — SIGNIFICANT CHANGE UP (ref 96–108)
CO2 SERPL-SCNC: 29 MMOL/L — SIGNIFICANT CHANGE UP (ref 22–31)
CREAT SERPL-MCNC: 0.49 MG/DL — LOW (ref 0.5–1.3)
CULTURE RESULTS: SIGNIFICANT CHANGE UP
CULTURE RESULTS: SIGNIFICANT CHANGE UP
EGFR: 99 ML/MIN/1.73M2 — SIGNIFICANT CHANGE UP
GAS PNL BLDA: SIGNIFICANT CHANGE UP
GLUCOSE BLDC GLUCOMTR-MCNC: 146 MG/DL — HIGH (ref 70–99)
GLUCOSE BLDC GLUCOMTR-MCNC: 210 MG/DL — HIGH (ref 70–99)
GLUCOSE BLDC GLUCOMTR-MCNC: 216 MG/DL — HIGH (ref 70–99)
GLUCOSE BLDC GLUCOMTR-MCNC: 282 MG/DL — HIGH (ref 70–99)
GLUCOSE SERPL-MCNC: 152 MG/DL — HIGH (ref 70–99)
HCT VFR BLD CALC: 33.1 % — LOW (ref 34.5–45)
HGB BLD-MCNC: 9.7 G/DL — LOW (ref 11.5–15.5)
MAGNESIUM SERPL-MCNC: 1.9 MG/DL — SIGNIFICANT CHANGE UP (ref 1.6–2.6)
MCHC RBC-ENTMCNC: 23.2 PG — LOW (ref 27–34)
MCHC RBC-ENTMCNC: 29.3 GM/DL — LOW (ref 32–36)
MCV RBC AUTO: 79 FL — LOW (ref 80–100)
NRBC # BLD: 0 /100 WBCS — SIGNIFICANT CHANGE UP (ref 0–0)
PHOSPHATE SERPL-MCNC: 3.5 MG/DL — SIGNIFICANT CHANGE UP (ref 2.5–4.5)
PLATELET # BLD AUTO: 333 K/UL — SIGNIFICANT CHANGE UP (ref 150–400)
POTASSIUM SERPL-MCNC: 4 MMOL/L — SIGNIFICANT CHANGE UP (ref 3.5–5.3)
POTASSIUM SERPL-SCNC: 4 MMOL/L — SIGNIFICANT CHANGE UP (ref 3.5–5.3)
PROT SERPL-MCNC: 7.4 G/DL — SIGNIFICANT CHANGE UP (ref 6–8.3)
RBC # BLD: 4.19 M/UL — SIGNIFICANT CHANGE UP (ref 3.8–5.2)
RBC # FLD: 22.3 % — HIGH (ref 10.3–14.5)
RH IG SCN BLD-IMP: POSITIVE — SIGNIFICANT CHANGE UP
SODIUM SERPL-SCNC: 145 MMOL/L — SIGNIFICANT CHANGE UP (ref 135–145)
SPECIMEN SOURCE: SIGNIFICANT CHANGE UP
SPECIMEN SOURCE: SIGNIFICANT CHANGE UP
VANCOMYCIN TROUGH SERPL-MCNC: 13.9 UG/ML — SIGNIFICANT CHANGE UP (ref 10–20)
VANCOMYCIN TROUGH SERPL-MCNC: 14.9 UG/ML — SIGNIFICANT CHANGE UP (ref 10–20)
WBC # BLD: 12.09 K/UL — HIGH (ref 3.8–10.5)
WBC # FLD AUTO: 12.09 K/UL — HIGH (ref 3.8–10.5)

## 2022-10-26 PROCEDURE — 71045 X-RAY EXAM CHEST 1 VIEW: CPT | Mod: 26

## 2022-10-26 PROCEDURE — 99292 CRITICAL CARE ADDL 30 MIN: CPT | Mod: 24

## 2022-10-26 PROCEDURE — 99232 SBSQ HOSP IP/OBS MODERATE 35: CPT

## 2022-10-26 PROCEDURE — 99291 CRITICAL CARE FIRST HOUR: CPT | Mod: 24

## 2022-10-26 RX ORDER — MAGNESIUM SULFATE 500 MG/ML
2 VIAL (ML) INJECTION ONCE
Refills: 0 | Status: COMPLETED | OUTPATIENT
Start: 2022-10-26 | End: 2022-10-26

## 2022-10-26 RX ADMIN — DORZOLAMIDE HYDROCHLORIDE 1 DROP(S): 20 SOLUTION/ DROPS OPHTHALMIC at 13:43

## 2022-10-26 RX ADMIN — Medication 250 MILLIGRAM(S): at 22:26

## 2022-10-26 RX ADMIN — MEROPENEM 100 MILLIGRAM(S): 1 INJECTION INTRAVENOUS at 13:44

## 2022-10-26 RX ADMIN — Medication 12.5 MILLIGRAM(S): at 17:19

## 2022-10-26 RX ADMIN — Medication 4: at 07:03

## 2022-10-26 RX ADMIN — Medication 3 MILLILITER(S): at 17:26

## 2022-10-26 RX ADMIN — APIXABAN 5 MILLIGRAM(S): 2.5 TABLET, FILM COATED ORAL at 07:14

## 2022-10-26 RX ADMIN — Medication 250 MILLIGRAM(S): at 09:50

## 2022-10-26 RX ADMIN — DORZOLAMIDE HYDROCHLORIDE 1 DROP(S): 20 SOLUTION/ DROPS OPHTHALMIC at 21:20

## 2022-10-26 RX ADMIN — Medication 50 MILLIGRAM(S): at 06:59

## 2022-10-26 RX ADMIN — Medication 3 MILLILITER(S): at 11:37

## 2022-10-26 RX ADMIN — Medication 1 DROP(S): at 07:01

## 2022-10-26 RX ADMIN — Medication 5 MILLIGRAM(S): at 07:00

## 2022-10-26 RX ADMIN — FLUCONAZOLE 150 MILLIGRAM(S): 150 TABLET ORAL at 21:21

## 2022-10-26 RX ADMIN — MAGNESIUM OXIDE 400 MG ORAL TABLET 400 MILLIGRAM(S): 241.3 TABLET ORAL at 06:59

## 2022-10-26 RX ADMIN — CHLORHEXIDINE GLUCONATE 15 MILLILITER(S): 213 SOLUTION TOPICAL at 17:20

## 2022-10-26 RX ADMIN — Medication 1 TABLET(S): at 11:18

## 2022-10-26 RX ADMIN — Medication 1 APPLICATION(S): at 11:20

## 2022-10-26 RX ADMIN — MEROPENEM 100 MILLIGRAM(S): 1 INJECTION INTRAVENOUS at 21:23

## 2022-10-26 RX ADMIN — APIXABAN 5 MILLIGRAM(S): 2.5 TABLET, FILM COATED ORAL at 17:19

## 2022-10-26 RX ADMIN — Medication 3 MILLILITER(S): at 05:14

## 2022-10-26 RX ADMIN — Medication 40 MILLIGRAM(S): at 07:16

## 2022-10-26 RX ADMIN — Medication 12.5 MILLIGRAM(S): at 07:00

## 2022-10-26 RX ADMIN — Medication 0.5 MILLIGRAM(S): at 05:15

## 2022-10-26 RX ADMIN — Medication 3 MILLILITER(S): at 23:19

## 2022-10-26 RX ADMIN — Medication 5 MILLIGRAM(S): at 21:23

## 2022-10-26 RX ADMIN — MEXILETINE HYDROCHLORIDE 200 MILLIGRAM(S): 150 CAPSULE ORAL at 21:24

## 2022-10-26 RX ADMIN — Medication 50 MILLIGRAM(S): at 13:44

## 2022-10-26 RX ADMIN — Medication 5 MILLIGRAM(S): at 13:45

## 2022-10-26 RX ADMIN — HUMAN INSULIN 10 UNIT(S): 100 INJECTION, SUSPENSION SUBCUTANEOUS at 00:10

## 2022-10-26 RX ADMIN — HUMAN INSULIN 10 UNIT(S): 100 INJECTION, SUSPENSION SUBCUTANEOUS at 07:02

## 2022-10-26 RX ADMIN — MEROPENEM 100 MILLIGRAM(S): 1 INJECTION INTRAVENOUS at 07:13

## 2022-10-26 RX ADMIN — HUMAN INSULIN 10 UNIT(S): 100 INJECTION, SUSPENSION SUBCUTANEOUS at 17:20

## 2022-10-26 RX ADMIN — CHLORHEXIDINE GLUCONATE 1 APPLICATION(S): 213 SOLUTION TOPICAL at 06:00

## 2022-10-26 RX ADMIN — HUMAN INSULIN 10 UNIT(S): 100 INJECTION, SUSPENSION SUBCUTANEOUS at 11:19

## 2022-10-26 RX ADMIN — Medication 4: at 11:19

## 2022-10-26 RX ADMIN — MAGNESIUM OXIDE 400 MG ORAL TABLET 400 MILLIGRAM(S): 241.3 TABLET ORAL at 21:22

## 2022-10-26 RX ADMIN — Medication 4 MILLILITER(S): at 05:16

## 2022-10-26 RX ADMIN — MAGNESIUM OXIDE 400 MG ORAL TABLET 400 MILLIGRAM(S): 241.3 TABLET ORAL at 13:45

## 2022-10-26 RX ADMIN — MEXILETINE HYDROCHLORIDE 200 MILLIGRAM(S): 150 CAPSULE ORAL at 13:45

## 2022-10-26 RX ADMIN — Medication 6: at 17:20

## 2022-10-26 RX ADMIN — Medication 1 DROP(S): at 17:21

## 2022-10-26 RX ADMIN — Medication 4 MILLILITER(S): at 17:26

## 2022-10-26 RX ADMIN — Medication 50 MILLIGRAM(S): at 21:22

## 2022-10-26 RX ADMIN — Medication 25 GRAM(S): at 01:43

## 2022-10-26 RX ADMIN — PANTOPRAZOLE SODIUM 40 MILLIGRAM(S): 20 TABLET, DELAYED RELEASE ORAL at 11:18

## 2022-10-26 RX ADMIN — Medication 500 MILLIGRAM(S): at 11:19

## 2022-10-26 RX ADMIN — DORZOLAMIDE HYDROCHLORIDE 1 DROP(S): 20 SOLUTION/ DROPS OPHTHALMIC at 07:01

## 2022-10-26 RX ADMIN — Medication 650 MILLIGRAM(S): at 01:43

## 2022-10-26 RX ADMIN — Medication 8 MILLIGRAM(S): at 06:59

## 2022-10-26 RX ADMIN — MEXILETINE HYDROCHLORIDE 200 MILLIGRAM(S): 150 CAPSULE ORAL at 07:14

## 2022-10-26 RX ADMIN — CHLORHEXIDINE GLUCONATE 15 MILLILITER(S): 213 SOLUTION TOPICAL at 07:42

## 2022-10-26 RX ADMIN — Medication 0.5 MILLIGRAM(S): at 17:26

## 2022-10-26 NOTE — PROGRESS NOTE ADULT - SUBJECTIVE AND OBJECTIVE BOX
Patient seen and examined at the bedside.    Remained critically ill on continuous ICU monitoring.    OBJECTIVE:  Vital Signs Last 24 Hrs  T(C): 36.3 (26 Oct 2022 20:00), Max: 36.9 (26 Oct 2022 00:00)  T(F): 97.3 (26 Oct 2022 20:00), Max: 98.4 (26 Oct 2022 00:00)  HR: 77 (26 Oct 2022 20:00) (75 - 88)  BP: --  BP(mean): --  RR: 24 (26 Oct 2022 20:00) (19 - 32)  SpO2: 99% (26 Oct 2022 20:00) (96% - 100%)    Parameters below as of 26 Oct 2022 20:00  Patient On (Oxygen Delivery Method): tracheostomy collar      Physical Exam:   General: OOBTC, NAD, generalized weakness  Neurology: interactive, able to follow simple commands, denies pain  ENT/Neck: + trach c/d/i, neck supple, trachea midline   Respiratory: coarse BS b/l, rhonchi throughout, minimal secretions  CV: S1S2, no murmurs        [x] Sinus Rhythm (pAF)  Abdominal: Soft, NT, ND, colostomy in place (stoma intact)  Extremities: +4 RUE edema, 3+LUE edema, trace edema noted, + peripheral pulses   Skin: Dry dressing over right heel, R elbow wound w/ overlying cling dressing c/d/i                            Assessment:  71F PMH DM, COPD, chronic adrenal insufficiency on Prednisone, history of colorectal cancer s/p resection (colostomy bag), Hx of CAD, chronic AF on Eliquis, and tracheomalacia s/p multiple intubations, recent dx of R foot OM s/p debridement on antibiotics and presented to ED at [facility] this morning with epigastric pain, belching and central chest pain. Found on CTA to have type A aortic dissection and transferred to [facility] for surgical evaluation by Dr. Cabrera.     Ascending aortic dissection s/p type A dissection repair and Biobentall on 9/7   Respiratory failure s/p Trach 10/10/22  Hypovolemia   Post op respiratory failure   Acute blood loss anemia  Stress hyperglycemia   RIJ thrombus  MRSA PNA    Plan:   ***Neuro***  [x] Nonfocal  follows simple commands, continue to monitor  PT/OT progress as tolerated, standing exercises     ***Cardiovascular***  Limited TTE on 10/1: EF 69%, Hyperdynamic left ventricular systolic function. There is hypokinesis of the mid-base inferior wall. Nml RV fxn.   CT chest on 9/28: No CT evidence of pulmonary embolism. Status post repair of ascending aortic dissection with a stable dissection flap originating from the aortic arch and extending into the infrarenal abdominal aorta,  B/l UE duplex on 10/5: As before, there is an occluded RIGHT IJ vein with thrombosis extending to brachiocephalic vein. Superficial thrombophlebitis of the LEFT cephalic vein.   Invasive hemodynamic monitoring, assess perfusion indices   SR / MAP 73/ Hct 33.1%/ Lactate 0.9  [x] Hydralazine PO for BP management   Rate control with Mexiletine and Lopressor (pAF)  WVAC changed 10/26  [x] AC Therapy with Eliquis 5 BID for Afib and IJ thrombus  Reassessment of hemodynamics    ***Pulmonary***  Respiratory failure s/p Trach #8 portex  10/10/22, per ENT inner cannula needs to be changed daily, trach sutures d/c'ed by ENT 10/17   Post op vent management   Titration of FiO2, follow SpO2, CXR, blood gasses   Bronched 10/13  Secretions improved DC'ed mucomyst, continue duonebs  Suction as needed   Aggressive Pulmonary toilet  Patient back on trach collar 10/26    Mode: OFF           ***GI***  [x] Diet:  TFs Glucerna 1.2 @ goal 65ml/hr, tolerating without issues (when glucerna 1.5 back in stock will switch and decrease goal rate to 55ml/hr)  [x] Protonix    Reglan for gut motility    Added vit C and no carb pro source    ***Renal***  Continue to monitor I/Os, BUN/Creatinine.   Replete lytes PRN  Diuresis with Lasix     ***ID***  SCx on 10/4+Methicillin resistant Staphylococcus aureus, c/w IVPB Vancomycin, (plan for 6 week course in setting of valve surgery, 11/26 end date)  9/27 blood culture Neela Glabarata, on flucanazole (lifelong suppression)  BCx 10/13 NGTD, BC 10/21 and SCx NGTD  R elbow wound, S/p IR for elbow drainage 10/13 + Few Proteus mirabilis ESBL, Meropenem 7 day trial completed 10/19-> reordered 10/20 for reaccumulation of elbow brusitis--> now continuing. Ortho reconsulted and plastic surgery  10/22 plastics debrided R elbow eschar to subc tissue, ~8cc fluid aspirated and sent for culture. Wet to dry dressing changed 2x daily. PT put in VAC 10/25.  Joint Fluid Cx 10/22 NTD  Kaz for inhalation (completes 10/26)  Meropenem for 8 day course (until 10/28),started in setting of elbow collection 10/22, f/u ID for possible shorter course given culture NTD    ***Endocrine***  [x]  DM : HbA1c 9.4%                - [x] ISS  [x] NPH             - Need tight glycemic control to prevent wound infection.  Endocrine following, appreciate recommendations          Patient requires continuous monitoring with bedside rhythm monitoring, pulse oximetry monitoring, and continuous central venous and arterial pressure monitoring; and intermittent blood gas analysis. Care plan discussed with the ICU care team.   Patient remained critical, at risk for life threatening decompensation.    I have spent 40 minutes providing critical care management to this patient.    By signing my name below, I, Mary Jo Hewitt, attest that this documentation has been prepared under the direction and in the presence of Saleem Rico NP.  Electronically signed: Georgi Gabriel, 10-26-22 @ 20:58    I, Saleem Rico, personally performed the services described in this documentation. all medical record entries made by the marcyibronaldo were at my direction and in my presence. I have reviewed the chart and agree that the record reflects my personal performance and is accurate and complete  Electronically signed: Saleem Rico NP.

## 2022-10-26 NOTE — PROGRESS NOTE ADULT - SUBJECTIVE AND OBJECTIVE BOX
Patient seen and examined at the bedside.    Remained critically ill on continuous ICU monitoring.    OBJECTIVE:  Vital Signs Last 24 Hrs  T(C): 36.1 (26 Oct 2022 08:00), Max: 37 (25 Oct 2022 20:00)  T(F): 97 (26 Oct 2022 08:00), Max: 98.6 (25 Oct 2022 20:00)  HR: 76 (26 Oct 2022 08:00) (72 - 88)  BP: --  BP(mean): --  RR: 23 (26 Oct 2022 08:00) (20 - 32)  SpO2: 100% (26 Oct 2022 08:00) (98% - 100%)    Parameters below as of 26 Oct 2022 08:00  Patient On (Oxygen Delivery Method): tracheostomy collar    O2 Concentration (%): 40      Physical Exam:   General: OOBTC, NAD, generalized weakness  Neurology: interactive, able to follow simple commands, denies pain  ENT/Neck: + trach c/d/i, neck supple, trachea midline   Respiratory: coarse BS b/l, rhonchi throughout, minimal secretions  CV: S1S2, no murmurs        [x] Sinus Rhythm (pAF)  Abdominal: Soft, NT, ND, colostomy in place (stoma intact)  Extremities: +4 RUE edema, 3+LUE edema, trace edema noted, + peripheral pulses   Skin: Dry dressing over right heel, R elbow wound w/ overlying cling dressing c/d/i                              Assessment:  71F PMH DM, COPD, chronic adrenal insufficiency on Prednisone, history of colorectal cancer s/p resection (colostomy bag), Hx of CAD, chronic AF on Eliquis, and tracheomalacia s/p multiple intubations, recent dx of R foot OM s/p debridement on antibiotics and presented to ED at [facility] this morning with epigastric pain, belching and central chest pain. Found on CTA to have type A aortic dissection and transferred to [facility] for surgical evaluation by Dr. Cabrera.     Ascending aortic dissection s/p type A dissection repair and Biobentall on 9/7   Respiratory failure s/p Trach 10/10/22  Hypovolemia   Post op respiratory failure   Acute blood loss anemia  Stress hyperglycemia   RIJ thrombus  MRSA PNA          Plan:   ***Neuro***  follows simple commands, continue to monitor  PT/OT progress as tolerated, standing exercises    ***Cardiovascular***  Limited TTE on 10/1: EF 69%, Hyperdynamic left ventricular systolic function. There is hypokinesis of the mid-base inferior wall. Nml RV fxn.   CT chest on 9/28: No CT evidence of pulmonary embolism. Status post repair of ascending aortic dissection with a stable dissection flap originating from the aortic arch and extending into the infrarenal abdominal aorta,  B/l UE duplex on 10/5: As before, there is an occluded RIGHT IJ vein with thrombosis extending to brachiocephalic vein. Superficial thrombophlebitis of the LEFT cephalic vein.   Invasive hemodynamic monitoring, assess perfusion indices   SR / MAP 68/ Hct 33.1/ Lactate 0.9  [x] Hydralazine PO for BP management up-titrated to 37.5 TID  Rate control with Mexiletine and Lopressor (pAF)  [x] AC Therapy with Eliquis 5 BID for Afib and IJ thrombus  Reassessment of hemodynamics      ***Pulmonary***  Respiratory failure s/p Trach #8 portex  10/10/22, per ENT inner cannula needs to be changed daily, trach sutures d/c'ed by ENT 10/17   Post op vent management   Titration of FiO2, follow SpO2, CXR, blood gasses   Bronched 10/13  Continue with Trach Collar trials continuous TC since 10/18 @6am  Secretions improved DC'ed mucomyst, continue duonebs  Suction as needed       Mode: OFF              ***GI***  [x] Diet:  TFs Glucerna 1.2 @ goal 65ml/hr, tolerating without issues (when glucerna 1.5 back in stock will switch and decrease goal rate to 55ml/hr)  [x] Protonix    Reglan for gut motility    Added vit C and no carb pro source      ***Renal***  Continue to monitor I/Os, BUN/Creatinine.   Replete lytes PRN  Diuresis with Lasix 40 PO/D, pt euvolemic   Plan to put ramirez in       ***ID***  SCx on 10/4+Methicillin resistant Staphylococcus aureus, c/w IVPB Vancomycin, (plan for 6 week course in setting of valve surgery, 11/26 end date)  9/27 blood culture Neela Glabarata, on flucanazole (lifelong suppression)  BCx 10/13 NGTD, BC 10/21 and SCx NGTD  R elbow wound, S/p IR for elbow drainage 10/13 + Few Proteus mirabilis ESBL, Meropenem 7 day trial completed 10/19-> reordered 10/20 for reaccumulation of elbow brusitis--> now continuing. Ortho reconsulted and plastic surgery  10/22 plastics debrided R elbow eschar to subc tissue, ~8cc fluid aspirated and sent for culture. Wet to dry dressing changed 2x daily. PT put in VAC yesterday   Joint Fluid Cx 10/22 NTD  Kaz for inhalation (completes 10/26)  Meropenem for 8 day course (until 10/28),started in setting of elbow collection 10/22, f/u ID for possible shorter course given culture NTD      ***Endocrine***  [x]  DM : HbA1c 9.4%                - [x] ISS  [x] NPH             - Need tight glycemic control to prevent wound infection.  Endocrine following, appreciate recommendations          Patient requires continuous monitoring with bedside rhythm monitoring, pulse oximetry monitoring, and continuous central venous and arterial pressure monitoring; and intermittent blood gas analysis. Care plan discussed with the ICU care team.   Patient remained critical, at risk for life threatening decompensation.    I have spent 30 minutes providing critical care management to this patient.    By signing my name below, I, Kieran Plascencia, attest that this documentation has been prepared under the direction and in the presence of KIANA Issa   Electronically signed: Georgi Cordero, 10-26-22 @ 08:22    I, KIANA Issa, personally performed the services described in this documentation. all medical record entries made by the marcyibe were at my direction and in my presence. I have reviewed the chart and agree that the record reflects my personal performance and is accurate and complete  Electronically signed: KIANA Issa  Patient seen and examined at the bedside.    Remained critically ill on continuous ICU monitoring.    OBJECTIVE:  Vital Signs Last 24 Hrs  T(C): 36.1 (26 Oct 2022 08:00), Max: 37 (25 Oct 2022 20:00)  T(F): 97 (26 Oct 2022 08:00), Max: 98.6 (25 Oct 2022 20:00)  HR: 76 (26 Oct 2022 08:00) (72 - 88)  BP: --  BP(mean): --  RR: 23 (26 Oct 2022 08:00) (20 - 32)  SpO2: 100% (26 Oct 2022 08:00) (98% - 100%)    Parameters below as of 26 Oct 2022 08:00  Patient On (Oxygen Delivery Method): tracheostomy collar    O2 Concentration (%): 40      Physical Exam:   General: OOBTC, NAD, generalized weakness  Neurology: interactive, able to follow simple commands, denies pain  ENT/Neck: + trach c/d/i, neck supple, trachea midline   Respiratory: coarse BS b/l, rhonchi throughout, minimal secretions  CV: S1S2, no murmurs        [x] Sinus Rhythm (pAF)  Abdominal: Soft, NT, ND, colostomy in place (stoma intact)  Extremities: +4 RUE edema, 3+LUE edema, trace edema noted, + peripheral pulses   Skin: Dry dressing over right heel, R elbow wound w/ overlying cling dressing c/d/i                              Assessment:  71F PMH DM, COPD, chronic adrenal insufficiency on Prednisone, history of colorectal cancer s/p resection (colostomy bag), Hx of CAD, chronic AF on Eliquis, and tracheomalacia s/p multiple intubations, recent dx of R foot OM s/p debridement on antibiotics and presented to ED at [facility] this morning with epigastric pain, belching and central chest pain. Found on CTA to have type A aortic dissection and transferred to [facility] for surgical evaluation by Dr. Cabrera.     Ascending aortic dissection s/p type A dissection repair and Biobentall on 9/7   Respiratory failure s/p Trach 10/10/22  Hypovolemia   Post op respiratory failure   Acute blood loss anemia  Stress hyperglycemia   RIJ thrombus  MRSA PNA          Plan:   ***Neuro***  follows simple commands, continue to monitor  PT/OT progress as tolerated, standing exercises    ***Cardiovascular***  Limited TTE on 10/1: EF 69%, Hyperdynamic left ventricular systolic function. There is hypokinesis of the mid-base inferior wall. Nml RV fxn.   CT chest on 9/28: No CT evidence of pulmonary embolism. Status post repair of ascending aortic dissection with a stable dissection flap originating from the aortic arch and extending into the infrarenal abdominal aorta,  B/l UE duplex on 10/5: As before, there is an occluded RIGHT IJ vein with thrombosis extending to brachiocephalic vein. Superficial thrombophlebitis of the LEFT cephalic vein.   Invasive hemodynamic monitoring, assess perfusion indices   SR / MAP 68/ Hct 33.1/ Lactate 0.9  [x] Hydralazine PO for BP management up-titrated to 50 Q8  Rate control with Mexiletine and Lopressor (pAF)  WVAC changed today   [x] AC Therapy with Eliquis 5 BID for Afib and IJ thrombus  Reassessment of hemodynamics      ***Pulmonary***  Respiratory failure s/p Trach #8 portex  10/10/22, per ENT inner cannula needs to be changed daily, trach sutures d/c'ed by ENT 10/17   Post op vent management   Titration of FiO2, follow SpO2, CXR, blood gasses   Bronched 10/13  Continue with Trach Collar trials continuous TC since 10/18 @6am  Secretions improved DC'ed mucomyst, continue duonebs  Suction as needed   Aggressive Pulmonary toilet       Mode: OFF              ***GI***  [x] Diet:  TFs Glucerna 1.2 @ goal 65ml/hr, tolerating without issues (when glucerna 1.5 back in stock will switch and decrease goal rate to 55ml/hr)  [x] Protonix    Reglan for gut motility    Added vit C and no carb pro source      ***Renal***  Continue to monitor I/Os, BUN/Creatinine.   Replete lytes PRN  Diuresis with Lasix 40 PO/D, pt euvolemic   Plan to put ramirez in       ***ID***  SCx on 10/4+Methicillin resistant Staphylococcus aureus, c/w IVPB Vancomycin, (plan for 6 week course in setting of valve surgery, 11/26 end date)  9/27 blood culture Neela Glabarata, on flucanazole (lifelong suppression)  BCx 10/13 NGTD, BC 10/21 and SCx NGTD  R elbow wound, S/p IR for elbow drainage 10/13 + Few Proteus mirabilis ESBL, Meropenem 7 day trial completed 10/19-> reordered 10/20 for reaccumulation of elbow brusitis--> now continuing. Ortho reconsulted and plastic surgery  10/22 plastics debrided R elbow eschar to subc tissue, ~8cc fluid aspirated and sent for culture. Wet to dry dressing changed 2x daily. PT put in VAC yesterday   Joint Fluid Cx 10/22 NTD  Kaz for inhalation (completes 10/26)  Meropenem for 8 day course (until 10/28),started in setting of elbow collection 10/22, f/u ID for possible shorter course given culture NTD      ***Endocrine***  [x]  DM : HbA1c 9.4%                - [x] ISS  [x] NPH             - Need tight glycemic control to prevent wound infection.  Endocrine following, appreciate recommendations          Patient requires continuous monitoring with bedside rhythm monitoring, pulse oximetry monitoring, and continuous central venous and arterial pressure monitoring; and intermittent blood gas analysis. Care plan discussed with the ICU care team.   Patient remained critical, at risk for life threatening decompensation.    I have spent 30 minutes providing critical care management to this patient.    By signing my name below, I, Kieran Plascencia, attest that this documentation has been prepared under the direction and in the presence of KIANA Issa   Electronically signed: Georgi Cordero, 10-26-22 @ 08:22    I, KIANA Issa, personally performed the services described in this documentation. all medical record entries made by the marcyibe were at my direction and in my presence. I have reviewed the chart and agree that the record reflects my personal performance and is accurate and complete  Electronically signed: KIANA Issa  Patient seen and examined at the bedside.    Remained critically ill on continuous ICU monitoring.    OBJECTIVE:  Vital Signs Last 24 Hrs  T(C): 36.1 (26 Oct 2022 08:00), Max: 37 (25 Oct 2022 20:00)  T(F): 97 (26 Oct 2022 08:00), Max: 98.6 (25 Oct 2022 20:00)  HR: 76 (26 Oct 2022 08:00) (72 - 88)  BP: 124/45   BP(mean): 70  RR: 23 (26 Oct 2022 08:00) (20 - 32)  SpO2: 100% (26 Oct 2022 08:00) (98% - 100%)    Parameters below as of 26 Oct 2022 08:00  Patient On (Oxygen Delivery Method): tracheostomy collar    O2 Concentration (%): 40      Physical Exam:   General: OOBTC, NAD, generalized weakness  Neurology: interactive, able to follow simple commands, denies pain  ENT/Neck: + trach c/d/i, neck supple, trachea midline   Respiratory: coarse BS b/l, rhonchi throughout, minimal secretions  CV: S1S2, no murmurs        [x] Sinus Rhythm (pAF)  Abdominal: Soft, NT, ND, colostomy in place (stoma intact)  Extremities: +4 RUE edema, 3+LUE edema, trace edema noted, + peripheral pulses   Skin: Dry dressing over right heel, R elbow wound w/ overlying cling dressing c/d/i                              Assessment:  71F PMH DM, COPD, chronic adrenal insufficiency on Prednisone, history of colorectal cancer s/p resection (colostomy bag), Hx of CAD, chronic AF on Eliquis, and tracheomalacia s/p multiple intubations, recent dx of R foot OM s/p debridement on antibiotics and presented to ED at [facility] this morning with epigastric pain, belching and central chest pain. Found on CTA to have type A aortic dissection and transferred to [facility] for surgical evaluation by Dr. Cabrera.     Ascending aortic dissection s/p type A dissection repair and Biobentall on 9/7   Respiratory failure s/p Trach 10/10/22  Hypovolemia   Post op respiratory failure   Acute blood loss anemia  Stress hyperglycemia   RIJ thrombus  MRSA PNA          Plan:   ***Neuro***  follows simple commands, continue to monitor  PT/OT progress as tolerated, standing exercises    ***Cardiovascular***  Limited TTE on 10/1: EF 69%, Hyperdynamic left ventricular systolic function. There is hypokinesis of the mid-base inferior wall. Nml RV fxn.   CT chest on 9/28: No CT evidence of pulmonary embolism. Status post repair of ascending aortic dissection with a stable dissection flap originating from the aortic arch and extending into the infrarenal abdominal aorta,  B/l UE duplex on 10/5: As before, there is an occluded RIGHT IJ vein with thrombosis extending to brachiocephalic vein. Superficial thrombophlebitis of the LEFT cephalic vein.   Invasive hemodynamic monitoring, assess perfusion indices   SR / MAP 68/ Hct 33.1/ Lactate 0.9  [x] Hydralazine PO for BP management up-titrated to 50 Q8  Rate control with Mexiletine and Lopressor (pAF)  WVAC changed today   [x] AC Therapy with Eliquis 5 BID for Afib and IJ thrombus  Reassessment of hemodynamics      ***Pulmonary***  Respiratory failure s/p Trach #8 portex  10/10/22, per ENT inner cannula needs to be changed daily, trach sutures d/c'ed by ENT 10/17   Post op vent management   Titration of FiO2, follow SpO2, CXR, blood gasses   Bronched 10/13  Continue with Trach Collar trials continuous TC since 10/18 @6am  Secretions improved DC'ed mucomyst, continue duonebs  Suction as needed   Aggressive Pulmonary toilet       Mode: OFF              ***GI***  [x] Diet:  TFs Glucerna 1.2 @ goal 65ml/hr, tolerating without issues (when glucerna 1.5 back in stock will switch and decrease goal rate to 55ml/hr)  [x] Protonix    Reglan for gut motility    Added vit C and no carb pro source      ***Renal***  Continue to monitor I/Os, BUN/Creatinine.   Replete lytes PRN  Diuresis with Lasix 40 PO/D, good UOP        ***ID***  SCx on 10/4+Methicillin resistant Staphylococcus aureus, c/w IVPB Vancomycin, (plan for 6 week course in setting of valve surgery, 11/26 end date)  9/27 blood culture Neela Glabarata, on flucanazole (lifelong suppression)  BCx 10/13 NGTD, BC 10/21 and SCx NGTD  R elbow wound, S/p IR for elbow drainage 10/13 + Few Proteus mirabilis ESBL, Meropenem 7 day trial completed 10/19-> reordered 10/20 for reaccumulation of elbow brusitis--> now continuing. Ortho reconsulted and plastic surgery  10/22 plastics debrided R elbow eschar to subc tissue, ~8cc fluid aspirated and sent for culture. Wet to dry dressing changed 2x daily. PT put in VAC yesterday   Joint Fluid Cx 10/22 NTD  Kaz for inhalation (completes 10/26)  Meropenem for 8 day course (until 10/28),started in setting of elbow collection 10/22, f/u ID for possible shorter course given culture NTD      ***Endocrine***  [x]  DM : HbA1c 9.4%                - [x] ISS  [x] NPH             - Need tight glycemic control to prevent wound infection.  Endocrine following, appreciate recommendations          Patient requires continuous monitoring with bedside rhythm monitoring, pulse oximetry monitoring, and continuous central venous and arterial pressure monitoring; and intermittent blood gas analysis. Care plan discussed with the ICU care team.   Patient remained critical, at risk for life threatening decompensation.    I have spent 35 minutes providing critical care management to this patient.    By signing my name below, I, Kieran Plascencia, attest that this documentation has been prepared under the direction and in the presence of KIANA Issa   Electronically signed: Georgi Cordero, 10-26-22 @ 08:22    I, KIANA Issa, personally performed the services described in this documentation. all medical record entries made by the marcyibe were at my direction and in my presence. I have reviewed the chart and agree that the record reflects my personal performance and is accurate and complete  Electronically signed: KIANA Issa

## 2022-10-26 NOTE — PROGRESS NOTE ADULT - NSPROGADDITIONALINFOA_GEN_ALL_CORE
-Plan discussed with pt/team.  Contact info: [phone] (24/7). pager [phone]  [email or web address] password [email or web address]  Teams  Spent 28 minutes assessing pt/labs/meds and discussing plan of care with primary team  Adjusting insulin  Discharge plan  Follow up care

## 2022-10-26 NOTE — PROGRESS NOTE ADULT - ASSESSMENT
71F w/h/o uncontrolled T2DM (A1C 9.4%) on basal/bolus insulin PTA. Unknown DM complications. Also COPD, secondary adrenal Insufficiency on chronic steroids, colorectal cancer s/p resection (colostomy bag), Chronic A fib on Eliquis, and tracheomalacia and multiple intubations, recent dx of OM presented to ED at [facility] with epigastric pain, belching and central chest pain. Found on CTA to have type A aortic dissection and transferred to [facility] for surgical evaluation by Dr. Cabrera. Now s/p aortic dissection repair on 9/07/22. Endocrine consulted for assistance with uncontrolled DM/steroids for AI. Pt in ICU with ventricular dysfunction/sepsis, and now s/p trach 10/10 and more recently s/p R elbow eschar debridement 10/22 > Wound VAC 10/24. Tolerating TFs with BG levels variable while on lower NPH insulin doses. Since pt has very variable BG  levels will keep NPH dose at 10 units q6h and adjusts only if persistent hyperglycemia. Pt gets Prednisone in am so might benefit from highr NPH dose at 6am but will wait and reassess this before increasing dose.  BG goal 100 [id number].Pt remains with leukocytosis and resolved transaminitis. On Prednisone dose 8mg for AI.

## 2022-10-26 NOTE — PROGRESS NOTE ADULT - PROBLEM SELECTOR PLAN 1
-test BG q6h if NPO/TF   -C.w NPH 10 units q6h for now. If BG <100 can administer 50% of the dose. HOLD IF TFS OFF  -If BG 200s in am tomorrow consider NPH 12 at 6am and 12noon and c/w NPH 10units at 6pm and 12mn  -C/w Admelog moderate correction scale q6h for now  Discharge plan:  - Likely to discharge patient on basal/bolus insulin. Final regimen pending clinical course.  - Recommend routine outpatient ophthalmology, podiatry  - Can f/u with endocrinologist Dr. Saavedra.  - Make sure pt has Rx for all DM supplies and insulin/ DM meds.  -Based on pt's clinical condition and mental status at this time she is not able to independently manage her DM. Will evaluate pt's ability to manage DM at time of discharge. If unable> pt will need family/ care giver (s) to manage DM care at home  -Will need rehab.

## 2022-10-26 NOTE — PROGRESS NOTE ADULT - SUBJECTIVE AND OBJECTIVE BOX
DIABETES FOLLOW UP NOTE: Saw pt earlier today    Chief Complaint: Endocrine consult requested for management of T2DM    INTERVAL HX:Saw pt in CTU, able to nod yes/no to questions. Feels tired and sleepy today. BG levels 100s to 200s in the last 24 hours while on present insulin doses.  No hypoglycemia. Per staff tolerating TFs of Glucerna at 65cc/hr. No further emesis. No hypoglycemia.  Remains on antibiotics for sepsis       Review of Systems:  General: As above  Cardiovascular: No chest pain, palpitations  Respiratory: No SOB, no cough  GI: No nausea, vomiting, abdominal pain  Endocrine: No polyuria, polydipsia or S&Sx of hypoglycemia    Allergies    aspirin (Short breath)  Avelox (Short breath; Pruritus)  cefepime (Anaphylaxis)  codeine (Short breath)  Dilaudid (Short breath)  iodine (Short breath; Swelling)  penicillin (Anaphylaxis)  shellfish (Anaphylaxis)  tetanus toxoid (Short breath)  Valium (Short breath)    Intolerances      MEDICATIONS  fluconAZOLE IVPB 600 milliGRAM(s) IV Intermittent every 24 hours  insulin lispro (ADMELOG) corrective regimen sliding scale   SubCutaneous every 6 hours  insulin NPH human recombinant 10 Unit(s) SubCutaneous every 6 hours  meropenem  IVPB 1000 milliGRAM(s) IV Intermittent every 8 hours  methylPREDNISolone 8 milliGRAM(s) Oral daily  vancomycin  IVPB 750 milliGRAM(s) IV Intermittent every 12 hours          PHYSICAL EXAM:  VITALS: T(C): 36.3 (10-26-22 @ 16:00)  T(F): 97.4 (10-26-22 @ 16:00), Max: 98.6 (10-25-22 @ 20:00)  HR: 85 (10-26-22 @ 16:00) (72 - 88)  BP: --  RR:  (19 - 32)  SpO2:  (96% - 100%)  Wt(kg): --  GENERAL: Female sitting in chair in NAD. Appears sleepy today  HEENT: Trach in place, NGT with TFs on at 65cc/hr  Chest: Sternal incision healed  Abdomen: Soft, nontender, non distended  Extremities: Warm, no edema in all 4 exts. RUE wound vac with minimal whitish foamy out put  NEURO: Alert and able to nod to questions    LABS:  POCT Blood Glucose.: 210 mg/dL (10-26-22 @ 11:17)  POCT Blood Glucose.: 216 mg/dL (10-26-22 @ 06:57)  POCT Blood Glucose.: 146 mg/dL (10-25-22 @ 23:59)  POCT Blood Glucose.: 183 mg/dL (10-25-22 @ 17:42)  POCT Blood Glucose.: 189 mg/dL (10-25-22 @ 12:43)  POCT Blood Glucose.: 107 mg/dL (10-25-22 @ 06:03)  POCT Blood Glucose.: 160 mg/dL (10-25-22 @ 00:38)  POCT Blood Glucose.: 195 mg/dL (10-24-22 @ 17:09)  POCT Blood Glucose.: 231 mg/dL (10-24-22 @ 11:59)  POCT Blood Glucose.: 83 mg/dL (10-24-22 @ 06:35)  POCT Blood Glucose.: 84 mg/dL (10-23-22 @ 23:58)  POCT Blood Glucose.: 201 mg/dL (10-23-22 @ 17:04)                            9.7    12.09 )-----------( 333      ( 26 Oct 2022 00:28 )             33.1       10-26    145  |  104  |  23  ----------------------------<  152<H>  4.0   |  29  |  0.49<L>    eGFR: 99    Ca    9.6      10-26  Mg     1.9     10-26  Phos  3.5     10-26    TPro  7.4  /  Alb  3.3  /  TBili  0.3  /  DBili  x   /  AST  21  /  ALT  22  /  AlkPhos  136<H>  10-26    Thyroid Function Tests:  10-03 @ 04:47 TSH 0.32 FreeT4 -- T3 -- Anti TPO -- Anti Thyroglobulin Ab -- TSI --      A1C with Estimated Average Glucose Result: 9.4 % (09-06-22 @ 16:46)      Estimated Average Glucose: 223 mg/dL (09-06-22 @ 16:46)

## 2022-10-26 NOTE — PROGRESS NOTE ADULT - SUBJECTIVE AND OBJECTIVE BOX
CHIEF COMPLAINT: f/up sob, chronic resp failure, TBM, severe persistent asthma, VC dysfunction, Type A aortic dissection s/p repair w/modified "Bentall procedure and hemiarch replacement -trached--on TC-just fatigued over the day (not breathing-breathing OK)    Interval Events:TC in place    REVIEW OF SYSTEMS:  Constitutional: No fevers or chills. No weight loss. No fatigue or generalized malaise.  Eyes: No itching or discharge from the eyes  ENT: No ear pain. No ear discharge. No nasal congestion. No post nasal drip. No epistaxis. No throat pain. No sore throat. No difficulty swallowing.   CV: No chest pain. No palpitations. No lightheadedness or dizziness.   Resp: No dyspnea at rest. No dyspnea on exertion. No orthopnea. No wheezing. No cough. No stridor. + sputum production. No chest pain with respiration.  GI: No nausea. No vomiting. No diarrhea.  MSK: No joint pain or pain in any extremities  Integumentary: No skin lesions. No pedal edema.  Neurological: + gross motor weakness. No sensory changes.  [+ ] All other systems negative  [ ] Unable to assess ROS because ________    OBJECTIVE:  ICU Vital Signs Last 24 Hrs  T(C): 36.9 (26 Oct 2022 00:00), Max: 37 (25 Oct 2022 20:00)  T(F): 98.4 (26 Oct 2022 00:00), Max: 98.6 (25 Oct 2022 20:00)  HR: 87 (26 Oct 2022 04:00) (71 - 88)  BP: --  BP(mean): --  ABP: 141/51 (26 Oct 2022 04:00) (118/48 - 189/63)  ABP(mean): 80 (26 Oct 2022 04:00) (68 - 142)  RR: 32 (26 Oct 2022 04:00) (15 - 32)  SpO2: 100% (26 Oct 2022 04:00) (94% - 100%)    O2 Parameters below as of 26 Oct 2022 04:00  Patient On (Oxygen Delivery Method): tracheostomy collar    O2 Concentration (%): 40      Mode: OFF    10-24 @ 07:01  -  10-25 @ 07:00  --------------------------------------------------------  IN: 2320 mL / OUT: 600 mL / NET: 1720 mL    10-25 @ 07:01  -  10-26 @ 04:52  --------------------------------------------------------  IN: 2370 mL / OUT: 1336 mL / NET: 1034 mL      CAPILLARY BLOOD GLUCOSE  107 (25 Oct 2022 06:00)      POCT Blood Glucose.: 146 mg/dL (25 Oct 2022 23:59)      PHYSICAL EXAM: NAD in bed on TC  General: Awake, alert, oriented X 3.   HEENT: Atraumatic, normocephalic.                 Mallampatti Grade 3                No nasal congestion.                No tonsillar or pharyngeal exudates.  Lymph Nodes: No palpable lymphadenopathy  Neck: No JVD. No carotid bruit.   Respiratory: Normal chest expansion                         Normal percussion                         Normal and equal air entry                         No wheeze, rhonchi or rales.  Cardiovascular: S1 S2 normal. No murmurs, rubs or gallops.   Abdomen: Soft, non-tender, non-distended. No organomegaly. Normoactive bowel sounds.  Extremities: Warm to touch. Peripheral pulse palpable. No pedal edema.   Skin: No rashes or skin lesions  Neurological: Motor and sensory examination equal and abnormal in all four extremities.  Psychiatry: Appropriate mood and affect.    HOSPITAL MEDICATIONS:  MEDICATIONS  (STANDING):  acetylcysteine 10%  Inhalation 4 milliLiter(s) Inhalation every 12 hours  albuterol/ipratropium for Nebulization 3 milliLiter(s) Nebulizer every 6 hours  apixaban 5 milliGRAM(s) Oral every 12 hours  ascorbic acid 500 milliGRAM(s) Oral daily  buDESOnide    Inhalation Suspension 0.5 milliGRAM(s) Inhalation every 12 hours  chlorhexidine 0.12% Liquid 15 milliLiter(s) Oral Mucosa every 12 hours  chlorhexidine 2% Cloths 1 Application(s) Topical <User Schedule>  collagenase Ointment 1 Application(s) Topical daily  dextrose 50% Injectable 50 milliLiter(s) IV Push every 15 minutes  dorzolamide 2% Ophthalmic Solution 1 Drop(s) Left EYE three times a day  fluconAZOLE IVPB 600 milliGRAM(s) IV Intermittent every 24 hours  furosemide    Tablet 40 milliGRAM(s) Oral daily  hydrALAZINE 50 milliGRAM(s) Oral every 8 hours  insulin lispro (ADMELOG) corrective regimen sliding scale   SubCutaneous every 6 hours  insulin NPH human recombinant 10 Unit(s) SubCutaneous every 6 hours  magnesium oxide 400 milliGRAM(s) Oral every 8 hours  meropenem  IVPB      meropenem  IVPB 1000 milliGRAM(s) IV Intermittent every 8 hours  methylPREDNISolone 8 milliGRAM(s) Oral daily  metoclopramide Injectable 5 milliGRAM(s) IV Push every 8 hours  metoprolol tartrate 12.5 milliGRAM(s) Enteral Tube every 12 hours  mexiletine 200 milliGRAM(s) Oral every 8 hours  multivitamin 1 Tablet(s) Oral daily  pantoprazole  Injectable 40 milliGRAM(s) IV Push daily  sodium chloride 0.9%. 1000 milliLiter(s) (10 mL/Hr) IV Continuous <Continuous>  timolol 0.5% Solution 1 Drop(s) Left EYE two times a day  vancomycin  IVPB 750 milliGRAM(s) IV Intermittent every 12 hours  vancomycin  IVPB        MEDICATIONS  (PRN):  acetaminophen    Suspension .. 650 milliGRAM(s) Oral every 6 hours PRN Temp greater or equal to 38C (100.4F), Mild Pain (1 - 3)      LABS:                        9.7    12.09 )-----------( 333      ( 26 Oct 2022 00:28 )             33.1     10-26    145  |  104  |  23  ----------------------------<  152<H>  4.0   |  29  |  0.49<L>    Ca    9.6      26 Oct 2022 00:28  Phos  3.5     10-26  Mg     1.9     10-26    TPro  7.4  /  Alb  3.3  /  TBili  0.3  /  DBili  x   /  AST  21  /  ALT  22  /  AlkPhos  136<H>  10-26      Urinalysis Basic - ( 25 Oct 2022 12:20 )    Color: Light Yellow / Appearance: Turbid / S.013 / pH: x  Gluc: x / Ketone: Negative  / Bili: Negative / Urobili: Negative   Blood: x / Protein: Trace / Nitrite: Negative   Leuk Esterase: Negative / RBC: 1 /hpf / WBC 1 /HPF   Sq Epi: x / Non Sq Epi: 0 /hpf / Bacteria: Negative      Arterial Blood Gas:  10-26 @ 00:00  7.46/47/63/33/91.7/8.5  ABG lactate: --  Arterial Blood Gas:  10-25 @ 09:20  7.44/51/103/35/99.2/9.2  ABG lactate: --  Arterial Blood Gas:  10-25 @ 05:12  7.44/50/151/34/99.5/8.7  ABG lactate: --  Arterial Blood Gas:  10-25 @ 00:14  7.47/48/72/35/94.7/10.0  ABG lactate: --    Venous Blood Gas:  10-25 @ 09:20  7.42/53/49/34/78.4  VBG Lactate: --      MICROBIOLOGY:     RADIOLOGY:  [ ] Reviewed and interpreted by me    Point of Care Ultrasound Findings:    PFT:    EKG:

## 2022-10-26 NOTE — PROGRESS NOTE ADULT - ASSESSMENT

## 2022-10-26 NOTE — PROGRESS NOTE ADULT - TIME BILLING
as above: back to TC- (she will always have them)-s/p  trach 10/10-s/p  resp failure-sepsis lawomysczo-MQDI-vc ABX-stable CV status-should use VEST rx if ok w/ CTS  multifactorial dyspnea-resp failure-severe persistent asthma, TBM s/p tracheoplasty, s/p Aortic aneurysm repair, Bronchitis (proteus)-O2-keep 90%;TC  severe persistent asthma--medrol 8mg, singulair 10, duoneb q 6, budes .5 bid, tezspire 8/29-was due 9/29  TBM-s/p tracheoplasty--accapella, unable to use vest due to surgery- added mucomyst here 2 cc 10% q 8 (secretions)  s/p Aortic aneurysm repair--as per CTS staff care  AF-on heparin--eventual eliquis rx               CV-improved cardene-MAP above 60  DVT R-IJ-on heparin rx  *****ID-s/p proteus bronchitis-s/p meropenum changed to ceftriaxone and back to meropenem/vanco- off of ABX as of 9/19--restart of ABX 9/27-meropenem/vanco-NOW on meropenem and vanco for MRSE sepsis (11/26 end date for vanco), plastics/ortho for elbow-proteus (?wash out needed)  PT-OOB as able                             GI-TF as able--f/up LFTs       VC dysfunction--aspiration precautions-s/p trach 10/10  GOC                                    Heme onc f/up colon ca  prog--critical in CTU                PT-to continue-more OOB    Eulalio Allison MD-Pulmonary   [phone]

## 2022-10-27 LAB
ALBUMIN SERPL ELPH-MCNC: 3.1 G/DL — LOW (ref 3.3–5)
ALP SERPL-CCNC: 129 U/L — HIGH (ref 40–120)
ALT FLD-CCNC: 17 U/L — SIGNIFICANT CHANGE UP (ref 10–45)
ANION GAP SERPL CALC-SCNC: 12 MMOL/L — SIGNIFICANT CHANGE UP (ref 5–17)
AST SERPL-CCNC: 18 U/L — SIGNIFICANT CHANGE UP (ref 10–40)
BILIRUB SERPL-MCNC: 0.2 MG/DL — SIGNIFICANT CHANGE UP (ref 0.2–1.2)
BUN SERPL-MCNC: 26 MG/DL — HIGH (ref 7–23)
CALCIUM SERPL-MCNC: 9.3 MG/DL — SIGNIFICANT CHANGE UP (ref 8.4–10.5)
CHLORIDE SERPL-SCNC: 100 MMOL/L — SIGNIFICANT CHANGE UP (ref 96–108)
CO2 SERPL-SCNC: 27 MMOL/L — SIGNIFICANT CHANGE UP (ref 22–31)
CREAT SERPL-MCNC: 0.49 MG/DL — LOW (ref 0.5–1.3)
EGFR: 99 ML/MIN/1.73M2 — SIGNIFICANT CHANGE UP
GAS PNL BLDA: SIGNIFICANT CHANGE UP
GLUCOSE BLDC GLUCOMTR-MCNC: 127 MG/DL — HIGH (ref 70–99)
GLUCOSE BLDC GLUCOMTR-MCNC: 162 MG/DL — HIGH (ref 70–99)
GLUCOSE BLDC GLUCOMTR-MCNC: 202 MG/DL — HIGH (ref 70–99)
GLUCOSE BLDC GLUCOMTR-MCNC: 210 MG/DL — HIGH (ref 70–99)
GLUCOSE BLDC GLUCOMTR-MCNC: 225 MG/DL — HIGH (ref 70–99)
GLUCOSE BLDC GLUCOMTR-MCNC: 308 MG/DL — HIGH (ref 70–99)
GLUCOSE BLDC GLUCOMTR-MCNC: 344 MG/DL — HIGH (ref 70–99)
GLUCOSE SERPL-MCNC: 236 MG/DL — HIGH (ref 70–99)
HCT VFR BLD CALC: 31.3 % — LOW (ref 34.5–45)
HGB BLD-MCNC: 9 G/DL — LOW (ref 11.5–15.5)
MAGNESIUM SERPL-MCNC: 2 MG/DL — SIGNIFICANT CHANGE UP (ref 1.6–2.6)
MCHC RBC-ENTMCNC: 23 PG — LOW (ref 27–34)
MCHC RBC-ENTMCNC: 28.8 GM/DL — LOW (ref 32–36)
MCV RBC AUTO: 80.1 FL — SIGNIFICANT CHANGE UP (ref 80–100)
NRBC # BLD: 0 /100 WBCS — SIGNIFICANT CHANGE UP (ref 0–0)
PHOSPHATE SERPL-MCNC: 3.1 MG/DL — SIGNIFICANT CHANGE UP (ref 2.5–4.5)
PLATELET # BLD AUTO: 293 K/UL — SIGNIFICANT CHANGE UP (ref 150–400)
POTASSIUM SERPL-MCNC: 4.2 MMOL/L — SIGNIFICANT CHANGE UP (ref 3.5–5.3)
POTASSIUM SERPL-SCNC: 4.2 MMOL/L — SIGNIFICANT CHANGE UP (ref 3.5–5.3)
PROT SERPL-MCNC: 7.2 G/DL — SIGNIFICANT CHANGE UP (ref 6–8.3)
RBC # BLD: 3.91 M/UL — SIGNIFICANT CHANGE UP (ref 3.8–5.2)
RBC # FLD: 22.4 % — HIGH (ref 10.3–14.5)
SODIUM SERPL-SCNC: 139 MMOL/L — SIGNIFICANT CHANGE UP (ref 135–145)
VANCOMYCIN TROUGH SERPL-MCNC: 15.2 UG/ML — SIGNIFICANT CHANGE UP (ref 10–20)
WBC # BLD: 14.37 K/UL — HIGH (ref 3.8–10.5)
WBC # FLD AUTO: 14.37 K/UL — HIGH (ref 3.8–10.5)

## 2022-10-27 PROCEDURE — 71045 X-RAY EXAM CHEST 1 VIEW: CPT | Mod: 26

## 2022-10-27 PROCEDURE — 99231 SBSQ HOSP IP/OBS SF/LOW 25: CPT

## 2022-10-27 PROCEDURE — 99291 CRITICAL CARE FIRST HOUR: CPT | Mod: 24

## 2022-10-27 PROCEDURE — 99232 SBSQ HOSP IP/OBS MODERATE 35: CPT

## 2022-10-27 PROCEDURE — 99292 CRITICAL CARE ADDL 30 MIN: CPT | Mod: 24

## 2022-10-27 RX ORDER — HUMAN INSULIN 100 [IU]/ML
15 INJECTION, SUSPENSION SUBCUTANEOUS EVERY 6 HOURS
Refills: 0 | Status: DISCONTINUED | OUTPATIENT
Start: 2022-10-27 | End: 2022-10-27

## 2022-10-27 RX ORDER — HUMAN INSULIN 100 [IU]/ML
8 INJECTION, SUSPENSION SUBCUTANEOUS ONCE
Refills: 0 | Status: COMPLETED | OUTPATIENT
Start: 2022-10-27 | End: 2022-10-27

## 2022-10-27 RX ORDER — MAGNESIUM SULFATE 500 MG/ML
2 VIAL (ML) INJECTION ONCE
Refills: 0 | Status: COMPLETED | OUTPATIENT
Start: 2022-10-27 | End: 2022-10-27

## 2022-10-27 RX ORDER — HUMAN INSULIN 100 [IU]/ML
10 INJECTION, SUSPENSION SUBCUTANEOUS
Refills: 0 | Status: DISCONTINUED | OUTPATIENT
Start: 2022-10-27 | End: 2022-10-28

## 2022-10-27 RX ORDER — HUMAN INSULIN 100 [IU]/ML
14 INJECTION, SUSPENSION SUBCUTANEOUS
Refills: 0 | Status: DISCONTINUED | OUTPATIENT
Start: 2022-10-27 | End: 2022-10-27

## 2022-10-27 RX ORDER — HUMAN INSULIN 100 [IU]/ML
14 INJECTION, SUSPENSION SUBCUTANEOUS
Refills: 0 | Status: DISCONTINUED | OUTPATIENT
Start: 2022-10-27 | End: 2022-10-28

## 2022-10-27 RX ADMIN — HUMAN INSULIN 10 UNIT(S): 100 INJECTION, SUSPENSION SUBCUTANEOUS at 00:15

## 2022-10-27 RX ADMIN — Medication 8: at 11:58

## 2022-10-27 RX ADMIN — HUMAN INSULIN 10 UNIT(S): 100 INJECTION, SUSPENSION SUBCUTANEOUS at 05:52

## 2022-10-27 RX ADMIN — Medication 1 APPLICATION(S): at 11:43

## 2022-10-27 RX ADMIN — Medication 4 MILLILITER(S): at 17:49

## 2022-10-27 RX ADMIN — DORZOLAMIDE HYDROCHLORIDE 1 DROP(S): 20 SOLUTION/ DROPS OPHTHALMIC at 13:30

## 2022-10-27 RX ADMIN — Medication 25 GRAM(S): at 06:30

## 2022-10-27 RX ADMIN — Medication 1 DROP(S): at 17:04

## 2022-10-27 RX ADMIN — Medication 12.5 MILLIGRAM(S): at 05:44

## 2022-10-27 RX ADMIN — MEXILETINE HYDROCHLORIDE 200 MILLIGRAM(S): 150 CAPSULE ORAL at 13:17

## 2022-10-27 RX ADMIN — Medication 4: at 00:14

## 2022-10-27 RX ADMIN — HUMAN INSULIN 8 UNIT(S): 100 INJECTION, SUSPENSION SUBCUTANEOUS at 13:16

## 2022-10-27 RX ADMIN — CHLORHEXIDINE GLUCONATE 1 APPLICATION(S): 213 SOLUTION TOPICAL at 05:58

## 2022-10-27 RX ADMIN — APIXABAN 5 MILLIGRAM(S): 2.5 TABLET, FILM COATED ORAL at 17:05

## 2022-10-27 RX ADMIN — Medication 650 MILLIGRAM(S): at 07:05

## 2022-10-27 RX ADMIN — Medication 50 MILLIGRAM(S): at 13:19

## 2022-10-27 RX ADMIN — Medication 500 MILLIGRAM(S): at 12:24

## 2022-10-27 RX ADMIN — HUMAN INSULIN 10 UNIT(S): 100 INJECTION, SUSPENSION SUBCUTANEOUS at 11:57

## 2022-10-27 RX ADMIN — Medication 3 MILLILITER(S): at 11:30

## 2022-10-27 RX ADMIN — Medication 0.5 MILLIGRAM(S): at 17:46

## 2022-10-27 RX ADMIN — Medication 50 MILLIGRAM(S): at 21:38

## 2022-10-27 RX ADMIN — Medication 5 MILLIGRAM(S): at 05:43

## 2022-10-27 RX ADMIN — Medication 4: at 17:12

## 2022-10-27 RX ADMIN — Medication 1 TABLET(S): at 11:57

## 2022-10-27 RX ADMIN — Medication 3 MILLILITER(S): at 17:45

## 2022-10-27 RX ADMIN — Medication 3 MILLILITER(S): at 06:43

## 2022-10-27 RX ADMIN — Medication 12.5 MILLIGRAM(S): at 17:05

## 2022-10-27 RX ADMIN — Medication 8 MILLIGRAM(S): at 06:05

## 2022-10-27 RX ADMIN — Medication 4 MILLILITER(S): at 06:42

## 2022-10-27 RX ADMIN — Medication 50 MILLIGRAM(S): at 05:42

## 2022-10-27 RX ADMIN — APIXABAN 5 MILLIGRAM(S): 2.5 TABLET, FILM COATED ORAL at 06:11

## 2022-10-27 RX ADMIN — HUMAN INSULIN 14 UNIT(S): 100 INJECTION, SUSPENSION SUBCUTANEOUS at 17:11

## 2022-10-27 RX ADMIN — CHLORHEXIDINE GLUCONATE 15 MILLILITER(S): 213 SOLUTION TOPICAL at 17:05

## 2022-10-27 RX ADMIN — DORZOLAMIDE HYDROCHLORIDE 1 DROP(S): 20 SOLUTION/ DROPS OPHTHALMIC at 21:39

## 2022-10-27 RX ADMIN — MAGNESIUM OXIDE 400 MG ORAL TABLET 400 MILLIGRAM(S): 241.3 TABLET ORAL at 05:43

## 2022-10-27 RX ADMIN — Medication 40 MILLIGRAM(S): at 05:42

## 2022-10-27 RX ADMIN — MAGNESIUM OXIDE 400 MG ORAL TABLET 400 MILLIGRAM(S): 241.3 TABLET ORAL at 13:18

## 2022-10-27 RX ADMIN — MEXILETINE HYDROCHLORIDE 200 MILLIGRAM(S): 150 CAPSULE ORAL at 05:43

## 2022-10-27 RX ADMIN — Medication 1 DROP(S): at 05:52

## 2022-10-27 RX ADMIN — DORZOLAMIDE HYDROCHLORIDE 1 DROP(S): 20 SOLUTION/ DROPS OPHTHALMIC at 05:42

## 2022-10-27 RX ADMIN — Medication 5 MILLIGRAM(S): at 13:18

## 2022-10-27 RX ADMIN — Medication 0.5 MILLIGRAM(S): at 06:43

## 2022-10-27 RX ADMIN — FLUCONAZOLE 150 MILLIGRAM(S): 150 TABLET ORAL at 21:06

## 2022-10-27 RX ADMIN — MEROPENEM 100 MILLIGRAM(S): 1 INJECTION INTRAVENOUS at 23:00

## 2022-10-27 RX ADMIN — Medication 250 MILLIGRAM(S): at 09:48

## 2022-10-27 RX ADMIN — Medication 5 MILLIGRAM(S): at 21:40

## 2022-10-27 RX ADMIN — MAGNESIUM OXIDE 400 MG ORAL TABLET 400 MILLIGRAM(S): 241.3 TABLET ORAL at 21:39

## 2022-10-27 RX ADMIN — MEXILETINE HYDROCHLORIDE 200 MILLIGRAM(S): 150 CAPSULE ORAL at 21:39

## 2022-10-27 RX ADMIN — CHLORHEXIDINE GLUCONATE 15 MILLILITER(S): 213 SOLUTION TOPICAL at 05:42

## 2022-10-27 RX ADMIN — MEROPENEM 100 MILLIGRAM(S): 1 INJECTION INTRAVENOUS at 13:19

## 2022-10-27 RX ADMIN — MEROPENEM 100 MILLIGRAM(S): 1 INJECTION INTRAVENOUS at 06:05

## 2022-10-27 NOTE — PROGRESS NOTE ADULT - ASSESSMENT
71F w/h/o uncontrolled T2DM (A1C 9.4%) on basal/bolus insulin PTA. Unknown DM complications. Also COPD, secondary adrenal Insufficiency on chronic steroids, colorectal cancer s/p resection (colostomy bag), Chronic A fib on Eliquis, and tracheomalacia and multiple intubations, recent dx of OM presented to ED at [facility] with epigastric pain, belching and central chest pain. Found on CTA to have type A aortic dissection and transferred to [facility] for surgical evaluation by Dr. Cabrera. Now s/p aortic dissection repair on 9/07/22. Endocrine consulted for assistance with uncontrolled DM/steroids for AI. Pt in ICU with ventricular dysfunction/sepsis, and now s/p trach 10/10 and more recently s/p R elbow eschar debridement 10/22 > Wound VAC 10/24. Pt remains with leukocytosis and resolved transaminitis. On Prednisone dose 8mg for AI.        Type 2 diabetes mellitus with hyperglycemia.   ·  Plan:  - BG Goal 100-180mg/dl    -test BG q6h if NPO/TF   -Continue with NPH 10 units at 0000 If BG <100 can administer 50% of the dose. HOLD IF TFS OFF  -Change to NPH 14 units at 0600, 1200; 1800 If BG <100 can administer 50% of the dose. HOLD IF TFS OFF  -C/w Admelog moderate correction scale q6h for now  Discharge plan:  - Likely to discharge patient on basal/bolus insulin. Final regimen pending clinical course.  - Recommend routine outpatient ophthalmology, podiatry  - Can f/u with endocrinologist Dr. Saavedra.  - Make sure pt has Rx for all DM supplies and insulin/ DM meds.  -Based on pt's clinical condition and mental status at this time she is not able to independently manage her DM. Will evaluate pt's ability to manage DM at time of discharge. If unable> pt will need family/ care giver (s) to manage DM care at home  -Will need rehab.     Problem/Plan - 2:  ·  Problem: Adrenal insufficiency.   ·  Plan: On chronic steroids at home: medrol 8mg qd   C/w Prednisone 8mg qd   Will need stress steroids if acutely ill.     Problem/Plan - 3:  ·  Problem: Hyperlipidemia.   ·  Plan: Off rosuvastatin 5mg daily  Re start if not contraindicated and as per cardiology  -F/u levels as out pt.      Discussed with patient and primary  team  Soosan NP   Contact via [employer] Teams during business hours  On evenings and weekends, please call [phone] or page endocrine fellow on call.   Email: [email or web address]   Please note that this patient may be followed by different provider tomorrow.    greater than 50% of the encounter was spent counseling and/or coordination of care.  26 minutes spent on total encounter; The necessity of the time spent during the encounter on this date of service was due to development of plan of care/coordination of care/glycemic control through review of labs, blood glucose values and vital signs.

## 2022-10-27 NOTE — PROGRESS NOTE ADULT - ASSESSMENT
71F PMH DM, COPD, chronic adrenal insufficiency on Prednisone, history of colorectal cancer s/p resection (colostomy bag), Hx of CAD, chronic AF on Eliquis, and tracheomalacia s/p multiple intubations, recent dx of R foot OM s/p debridement on antibiotics and presented to ED at [facility] this morning with epigastric pain, belching and central chest pain. Found on CTA to have type A aortic dissection and transferred to [facility] for surgical evaluation by Dr. Cabrera.     Ascending aortic dissection s/p type A dissection repair and Biobentall on 9/7   Respiratory failure s/p Trach 10/10/22  Hypovolemia   Post op respiratory failure   Acute blood loss anemia  Stress hyperglycemia   Leukocytosis   RIJ thrombus  MRSA PNA    Wound Consult requested to assist w/ management of   Sacral/bilateral Buttocks deep tissue injury resolved  Right elbow wound       1.) sacral/buttock injury - Allevyn Foam QD      Right elbow wound - VAC tx as per Plastics  2.) Pericare as per protocol - ramirez and ostomy care, continue w/ Attends pads, pericare BID  3.) Maintain on an alternating air with low air loss surface  4.) Turn and reposition Q 2 hours w/ assistive devices  5.) Nutrition optimization  - Moderate Acute Malnutrition -                high quality protein, mvi, vit c to assist w/ healing  6.) Offload heels/feet with complete cair air fluidized boots; ensure that the soles of the feet are not resting on the foot board of the bed.  7.)  hyperglycemia - improving w/ Glucerna, NPH, and FS w/ISS  Care as per CTU, remain available as requested, call for new issues or deterioration.  Upon discharge f/u as outpatient at [facility] [address] [facility] [phone]  s/w RN and D/w Team and Attng  Rosemary Spencer PA-C, CWS [id number]   I spent 15 minutes face to face w/ this pt of which more than 50% of the time was spent counseling & coordinating care of this pt.

## 2022-10-27 NOTE — PROGRESS NOTE ADULT - SUBJECTIVE AND OBJECTIVE BOX
· Subjective and Objective:   Patient seen and examined at the bedside.    Remained critically ill on continuous ICU monitoring.    OBJECTIVE:  Vital Signs Last 24 Hrs  T(C): 36.3 (26 Oct 2022 20:00), Max: 36.9 (26 Oct 2022 00:00)  T(F): 97.3 (26 Oct 2022 20:00), Max: 98.4 (26 Oct 2022 00:00)  HR: 77 (26 Oct 2022 20:00) (75 - 88)  BP: --  BP(mean): --  RR: 24 (26 Oct 2022 20:00) (19 - 32)  SpO2: 99% (26 Oct 2022 20:00) (96% - 100%)    Parameters below as of 26 Oct 2022 20:00  Patient On (Oxygen Delivery Method): tracheostomy collar      Physical Exam:   General: OOBTC, NAD, generalized weakness  Neurology: interactive, able to follow simple commands, denies pain  ENT/Neck: + trach c/d/i, neck supple, trachea midline   Respiratory: coarse BS b/l, rhonchi throughout, minimal secretions  CV: S1S2, no murmurs        [x] Sinus Rhythm (pAF)  Abdominal: Soft, NT, ND, colostomy in place (stoma intact)  Extremities: +4 RUE edema, 3+LUE edema, trace edema noted, + peripheral pulses   Skin: Dry dressing over right heel, R elbow wound w/ overlying cling dressing c/d/i                            Assessment:  71F PMH DM, COPD, chronic adrenal insufficiency on Prednisone, history of colorectal cancer s/p resection (colostomy bag), Hx of CAD, chronic AF on Eliquis, and tracheomalacia s/p multiple intubations, recent dx of R foot OM s/p debridement on antibiotics and presented to ED at [facility] this morning with epigastric pain, belching and central chest pain. Found on CTA to have type A aortic dissection and transferred to [facility] for surgical evaluation by Dr. Cabrera.     Ascending aortic dissection s/p type A dissection repair and Biobentall on 9/7   Respiratory failure s/p Trach 10/10/22  Hypovolemia   Post op respiratory failure   Acute blood loss anemia  Stress hyperglycemia   RIJ thrombus  MRSA PNA    Plan:   ***Neuro***  [x] Nonfocal  follows simple commands, continue to monitor  PT/OT progress as tolerated, standing exercises     ***Cardiovascular***  Limited TTE on 10/1: EF 69%, Hyperdynamic left ventricular systolic function. There is hypokinesis of the mid-base inferior wall. Nml RV fxn.   CT chest on 9/28: No CT evidence of pulmonary embolism. Status post repair of ascending aortic dissection with a stable dissection flap originating from the aortic arch and extending into the infrarenal abdominal aorta,  B/l UE duplex on 10/5: As before, there is an occluded RIGHT IJ vein with thrombosis extending to brachiocephalic vein. Superficial thrombophlebitis of the LEFT cephalic vein.   Invasive hemodynamic monitoring, assess perfusion indices   SR / MAP 73/ Hct 33.1%/ Lactate 0.9  [x] Hydralazine PO for BP management   Rate control with Mexiletine and Lopressor (pAF)  WVAC changed 10/26  [x] AC Therapy with Eliquis 5 BID for Afib and IJ thrombus  Reassessment of hemodynamics    ***Pulmonary***  Respiratory failure s/p Trach #8 portex  10/10/22, per ENT inner cannula needs to be changed daily, trach sutures d/c'ed by ENT 10/17   Post op vent management   Titration of FiO2, follow SpO2, CXR, blood gasses   Bronched 10/13  Secretions improved DC'ed mucomyst, continue duonebs  Suction as needed   Aggressive Pulmonary toilet  Patient back on trach collar 10/26    Mode: OFF           ***GI***  [x] Diet:  TFs Glucerna 1.2 @ goal 65ml/hr, tolerating without issues (when glucerna 1.5 back in stock will switch and decrease goal rate to 55ml/hr)  [x] Protonix    Reglan for gut motility    Added vit C and no carb pro source    ***Renal***  Continue to monitor I/Os, BUN/Creatinine.   Replete lytes PRN  Diuresis with Lasix     ***ID***  SCx on 10/4+Methicillin resistant Staphylococcus aureus, c/w IVPB Vancomycin, (plan for 6 week course in setting of valve surgery, 11/26 end date)  9/27 blood culture Neela Glabarata, on flucanazole (lifelong suppression)  BCx 10/13 NGTD, BC 10/21 and SCx NGTD  R elbow wound, S/p IR for elbow drainage 10/13 + Few Proteus mirabilis ESBL, Meropenem 7 day trial completed 10/19-> reordered 10/20 for reaccumulation of elbow brusitis--> now continuing. Ortho reconsulted and plastic surgery  10/22 plastics debrided R elbow eschar to subc tissue, ~8cc fluid aspirated and sent for culture. Wet to dry dressing changed 2x daily. PT put in VAC 10/25.  Joint Fluid Cx 10/22 NTD  Kaz for inhalation (completes 10/26)  Meropenem for 8 day course (until 10/28),started in setting of elbow collection 10/22, f/u ID for possible shorter course given culture NTD    ***Endocrine***  [x]  DM : HbA1c 9.4%                - [x] ISS  [x] NPH             - Need tight glycemic control to prevent wound infection.  Endocrine following, appreciate recommendations     · Subjective and Objective:   Patient seen and examined at the bedside.    Remained critically ill on continuous ICU monitoring.    OBJECTIVE:  Vital Signs Last 24 Hrs  T(C): 36.3 (26 Oct 2022 20:00), Max: 36.9 (26 Oct 2022 00:00)  T(F): 97.3 (26 Oct 2022 20:00), Max: 98.4 (26 Oct 2022 00:00)  HR: 77 (26 Oct 2022 20:00) (75 - 88)  BP: --  BP(mean): --  RR: 24 (26 Oct 2022 20:00) (19 - 32)  SpO2: 99% (26 Oct 2022 20:00) (96% - 100%)    Parameters below as of 26 Oct 2022 20:00  Patient On (Oxygen Delivery Method): tracheostomy collar      Physical Exam:   General: OOBTC, NAD, generalized weakness  Neurology: interactive, able to follow simple commands, denies pain  ENT/Neck: + trach c/d/i, neck supple, trachea midline   Respiratory: coarse BS b/l, rhonchi throughout, minimal secretions  CV: S1S2, no murmurs        [x] Sinus Rhythm (pAF)  Abdominal: Soft, NT, ND, colostomy in place (stoma intact)  Extremities: +4 RUE edema, 3+LUE edema, trace edema noted, + peripheral pulses   Skin: Dry dressing over right heel, R elbow wound w/ overlying cling dressing c/d/i                            Assessment:  71F PMH DM, COPD, chronic adrenal insufficiency on Prednisone, history of colorectal cancer s/p resection (colostomy bag), Hx of CAD, chronic AF on Eliquis, and tracheomalacia s/p multiple intubations, recent dx of R foot OM s/p debridement on antibiotics and presented to ED at [facility] this morning with epigastric pain, belching and central chest pain. Found on CTA to have type A aortic dissection and transferred to [facility] for surgical evaluation by Dr. Cabrera.     Ascending aortic dissection s/p type A dissection repair and Biobentall on 9/7   Respiratory failure s/p Trach 10/10/22  Hypovolemia   Post op respiratory failure   Acute blood loss anemia  Stress hyperglycemia   RIJ thrombus  MRSA PNA    Plan:   ***Neuro***  [x] Nonfocal  follows simple commands, continue to monitor  PT/OT progress as tolerated, ambulated with physical therapy    ***Cardiovascular***  Limited TTE on 10/1: EF 69%, Hyperdynamic left ventricular systolic function. There is hypokinesis of the mid-base inferior wall. Nml RV fxn.   CT chest on 9/28: No CT evidence of pulmonary embolism. Status post repair of ascending aortic dissection with a stable dissection flap originating from the aortic arch and extending into the infrarenal abdominal aorta,  B/l UE duplex on 10/5: As before, there is an occluded RIGHT IJ vein with thrombosis extending to brachiocephalic vein. Superficial thrombophlebitis of the LEFT cephalic vein.   Invasive hemodynamic monitoring, assess perfusion indices   SR / MAP 73/ Hct 33.1%/ Lactate 0.9  [x] Hydralazine PO for BP management   Rate control with Mexiletine and Lopressor (pAF)  WVAC on rt elbow  changed 10/26  [x] AC Therapy with Eliquis 5 BID for Afib and IJ thrombus  Reassessment of hemodynamics    ***Pulmonary***  Respiratory failure s/p Trach #8 portex  10/10/22, per ENT inner cannula needs to be changed daily, trach sutures d/c'ed by ENT 10/17 , 40% trach collar for 2 days  Post op vent management   Titration of FiO2, follow SpO2, CXR, blood gases   Bronched 10/13  Secretions improved DC'ed mucomyst, continue duonebs  Suction as needed   Aggressive Pulmonary toilet  Patient back on trach collar 10/26  Prednisone 8 mg poDaily for COPD  Mode: OFF           ***GI***  [x] Diet:  TFs Glucerna 1.2 @ goal 65ml/hr, tolerating without issues (when glucerna 1.5 back in stock will switch and decrease goal rate to 55ml/hr)  [x] Protonix    Reglan for gut motility    Added vit C and no carb pro source    ***Renal***  Continue to monitor I/Os, BUN/Creatinine.   Replete lytes PRN  Diuresis with Lasix     ***ID***  SCx on 10/4+Methicillin resistant Staphylococcus aureus, c/w IVPB Vancomycin, (plan for 6 week course in setting of valve surgery, 11/26 end date)  9/27 blood culture Neela Glabarata, on flucanazole (lifelong suppression)  BCx 10/13 NGTD, BC 10/21 and SCx NGTD  R elbow wound, S/p IR for elbow drainage 10/13 + Few Proteus mirabilis ESBL, Meropenem 7 day trial completed 10/19-> reordered 10/20 for reaccumulation of elbow brusitis--> now continuing. Ortho reconsulted and plastic surgery  10/22 plastics debrided R elbow eschar to subc tissue, ~8cc fluid aspirated and sent for culture. Wet to dry dressing changed 2x daily. PT put in VAC 10/25.  Joint Fluid Cx 10/22 NTD  Kaz for inhalation (completes 10/26)  Meropenem for 8 day course (until 10/28),started in setting of elbow collection 10/22, f/u ID for possible shorter course given culture NTD    ***Endocrine***  [x]  DM : HbA1c 9.4%                - [x] ISS  [x] NPH 6 to 15 units Q6hrly             - Need tight glycemic control to prevent wound infection.  Endocrine following, appreciate recommendations

## 2022-10-27 NOTE — PROGRESS NOTE ADULT - SUBJECTIVE AND OBJECTIVE BOX
CHIEF COMPLAINT: f/up sob, chronic resp failure, TBM, severe persistent asthma, VC dysfunction, Type A aortic dissection s/p repair w/modified "Bentall procedure and hemiarch replacement -trached--on TC-feels well, no pain or sob    Interval Events: frequ suctioning, TC    REVIEW OF SYSTEMS:  Constitutional: No fevers or chills. No weight loss. No fatigue or generalized malaise.  Eyes: No itching or discharge from the eyes  ENT: No ear pain. No ear discharge. No nasal congestion. No post nasal drip. No epistaxis. No throat pain. No sore throat. No difficulty swallowing.   CV: No chest pain. No palpitations. No lightheadedness or dizziness.   Resp: No dyspnea at rest. No dyspnea on exertion. No orthopnea. No wheezing. No cough. No stridor. No sputum production. No chest pain with respiration.  GI: No nausea. No vomiting. No diarrhea.  MSK: No joint pain or pain in any extremities  Integumentary: No skin lesions. No pedal edema.  Neurological: + gross motor weakness. No sensory changes.  [ +] All other systems negative  [ ] Unable to assess ROS because ________    OBJECTIVE:  ICU Vital Signs Last 24 Hrs  T(C): 36.1 (27 Oct 2022 04:00), Max: 36.3 (26 Oct 2022 16:00)  T(F): 97 (27 Oct 2022 04:00), Max: 97.4 (26 Oct 2022 16:00)  HR: 77 (27 Oct 2022 04:00) (71 - 88)  BP: --  BP(mean): --  ABP: 163/62 (27 Oct 2022 04:00) (68/21 - 168/59)  ABP(mean): 96 (27 Oct 2022 04:00) (37 - 96)  RR: 32 (27 Oct 2022 04:00) (19 - 32)  SpO2: 100% (27 Oct 2022 04:00) (96% - 100%)    O2 Parameters below as of 27 Oct 2022 04:00  Patient On (Oxygen Delivery Method): tracheostomy collar    O2 Concentration (%): 40      Mode: off    10-25 @ 07:01  -  10-26 @ 07:00  --------------------------------------------------------  IN: 2580 mL / OUT: 1471 mL / NET: 1109 mL    10-26 @ 07:01  -  10-27 @ 04:55  --------------------------------------------------------  IN: 2600 mL / OUT: 1950 mL / NET: 650 mL      CAPILLARY BLOOD GLUCOSE  282 (26 Oct 2022 17:00)      POCT Blood Glucose.: 225 mg/dL (27 Oct 2022 00:12)      PHYSICAL EXAM: NA Din bed on TC  General: Awake, alert, oriented X 3.   HEENT: Atraumatic, normocephalic.                 Mallampatti Grade 3                No nasal congestion.                No tonsillar or pharyngeal exudates.  Lymph Nodes: No palpable lymphadenopathy  Neck: No JVD. No carotid bruit.   Respiratory: abnormal chest expansion                         Normal percussion                         Normal and equal air entry                         No wheeze, rhonchi or rales.  Cardiovascular: S1 S2 normal. No murmurs, rubs or gallops.   Abdomen: Soft, non-tender, non-distended. No organomegaly. Normoactive bowel sounds.  Extremities: Warm to touch. Peripheral pulse palpable. No pedal edema.   Skin: No rashes or skin lesions  Neurological: Motor and sensory examination equal and normal in all four extremities.  Psychiatry: Appropriate mood and affect.    HOSPITAL MEDICATIONS:  MEDICATIONS  (STANDING):  acetylcysteine 10%  Inhalation 4 milliLiter(s) Inhalation every 12 hours  albuterol/ipratropium for Nebulization 3 milliLiter(s) Nebulizer every 6 hours  apixaban 5 milliGRAM(s) Oral every 12 hours  ascorbic acid 500 milliGRAM(s) Oral daily  buDESOnide    Inhalation Suspension 0.5 milliGRAM(s) Inhalation every 12 hours  chlorhexidine 0.12% Liquid 15 milliLiter(s) Oral Mucosa every 12 hours  chlorhexidine 2% Cloths 1 Application(s) Topical <User Schedule>  collagenase Ointment 1 Application(s) Topical daily  dextrose 50% Injectable 50 milliLiter(s) IV Push every 15 minutes  dorzolamide 2% Ophthalmic Solution 1 Drop(s) Left EYE three times a day  fluconAZOLE IVPB 600 milliGRAM(s) IV Intermittent every 24 hours  furosemide    Tablet 40 milliGRAM(s) Oral daily  hydrALAZINE 50 milliGRAM(s) Oral every 8 hours  insulin lispro (ADMELOG) corrective regimen sliding scale   SubCutaneous every 6 hours  insulin NPH human recombinant 10 Unit(s) SubCutaneous every 6 hours  magnesium oxide 400 milliGRAM(s) Oral every 8 hours  meropenem  IVPB      meropenem  IVPB 1000 milliGRAM(s) IV Intermittent every 8 hours  methylPREDNISolone 8 milliGRAM(s) Oral daily  metoclopramide Injectable 5 milliGRAM(s) IV Push every 8 hours  metoprolol tartrate 12.5 milliGRAM(s) Enteral Tube every 12 hours  mexiletine 200 milliGRAM(s) Oral every 8 hours  multivitamin 1 Tablet(s) Oral daily  pantoprazole  Injectable 40 milliGRAM(s) IV Push daily  sodium chloride 0.9%. 1000 milliLiter(s) (10 mL/Hr) IV Continuous <Continuous>  timolol 0.5% Solution 1 Drop(s) Left EYE two times a day  vancomycin  IVPB 750 milliGRAM(s) IV Intermittent every 12 hours  vancomycin  IVPB        MEDICATIONS  (PRN):  acetaminophen    Suspension .. 650 milliGRAM(s) Oral every 6 hours PRN Temp greater or equal to 38C (100.4F), Mild Pain (1 - 3)      LABS:                        9.0    14.37 )-----------( 293      ( 27 Oct 2022 01:06 )             31.3     10-27    139  |  100  |  26<H>  ----------------------------<  236<H>  4.2   |  27  |  0.49<L>    Ca    9.3      27 Oct 2022 01:06  Phos  3.1     10-27  Mg     2.0     10-27    TPro  7.2  /  Alb  3.1<L>  /  TBili  0.2  /  DBili  x   /  AST  18  /  ALT  17  /  AlkPhos  129<H>  10-27      Urinalysis Basic - ( 25 Oct 2022 12:20 )    Color: Light Yellow / Appearance: Turbid / S.013 / pH: x  Gluc: x / Ketone: Negative  / Bili: Negative / Urobili: Negative   Blood: x / Protein: Trace / Nitrite: Negative   Leuk Esterase: Negative / RBC: 1 /hpf / WBC 1 /HPF   Sq Epi: x / Non Sq Epi: 0 /hpf / Bacteria: Negative      Arterial Blood Gas:  10-27 @ 00:25  7.45/44/111/31/98.6/6.0  ABG lactate: --  Arterial Blood Gas:  10-26 @ 00:00  7.46/47/63/33/91.7/8.5  ABG lactate: --  Arterial Blood Gas:  10-25 @ 09:20  7.44/51/103/35/99.2/9.2  ABG lactate: --  Arterial Blood Gas:  10-25 @ 05:12  7.44/50/151/34/99.5/8.7  ABG lactate: --    Venous Blood Gas:  10-25 @ 09:20  7.42/53/49/34/78.4  VBG Lactate: --      MICROBIOLOGY:     RADIOLOGY:  [ ] Reviewed and interpreted by me    Point of Care Ultrasound Findings:    PFT:    EKG:

## 2022-10-27 NOTE — PROGRESS NOTE ADULT - NUTRITIONAL ASSESSMENT
This patient has been assessed with a concern for Malnutrition and has been determined to have a diagnosis/diagnoses of Moderate protein-calorie malnutrition.    This patient is being managed with:   Diet NPO with Tube Feed-  Tube Feeding Modality: Nasogastric  Glucerna 1.2 [place] (GLUCERNARTH)  Total Volume for 24 Hours (mL): 1560  Continuous  Starting Tube Feed Rate {mL per Hour}: 65  Until Goal Tube Feed Rate (mL per Hour): 65  Tube Feed Duration (in Hours): 24  Tube Feed Start Time: 08:20  No Carb Prosource TF     Qty per Day:  1  Entered: Oct 25 2022  3:15PM

## 2022-10-27 NOTE — PROGRESS NOTE ADULT - SUBJECTIVE AND OBJECTIVE BOX
Patient seen and examined at the bedside.    Remained critically ill on continuous ICU monitoring.    OBJECTIVE:  Vital Signs Last 24 Hrs  T(C): 35.9 (27 Oct 2022 20:00), Max: 36.2 (27 Oct 2022 00:00)  T(F): 96.7 (27 Oct 2022 20:00), Max: 97.2 (27 Oct 2022 00:00)  HR: 72 (27 Oct 2022 22:00) (68 - 90)  BP: --  BP(mean): --  RR: 22 (27 Oct 2022 22:00) (16 - 37)  SpO2: 100% (27 Oct 2022 22:00) (97% - 100%)    Parameters below as of 27 Oct 2022 20:00  Patient On (Oxygen Delivery Method): tracheostomy collar    O2 Concentration (%): 40      Physical Exam:   General: NAD   Neurology: nonfocal   Eyes: bilateral pupils equal and reactive   ENT/Neck: Neck supple, trachea midline, No JVD   Respiratory: Clear bilaterally   CV: S1S2, no murmurs        [x] Sternal dressing, [x] Mediastinal CT, [x] Pleural CT [x] Bulb         [x] Sinus rhythm, [x] Afib, [x] Temporary pacing, [x] PPM  Abdominal: Soft, NT, ND +BS   Extremities: 1-2+ pedal edema noted, + peripheral pulses   Skin: No Rashes, Hematoma, Ecchymosis                           Assessment:        Plan:   ***Neuro***  [x] Hx of CVA   [x] Nonfocal  [x] Sedated with [x] Diprivan [x] Precedex   Post operative neuro assessment     ***Cardiovascular***  Invasive hemodynamic monitoring, assess perfusion indices   SR / CVP ___ / MAP ___ / PAP ___ / CI ___ / Hct ___ / Lactate ___   [x] Amiodarone [x] Cardene [x] Dobutamine [x] Levophed [x] Phenylephrine [x] Vasopressin   [x]  IABP [x]  LVAD [x]  Impella [x] ECMO    Volume:    Reassessment of hemodynamics post resuscitation *or .rh if no fluids above*  ** insert PO MEDS**  Monitor chest tube outputs *remove if none present, check DAILY*  [x] AC therapy with / [x] VTE ppx with   [x]  ASA [x]  Plavix [x] Statin   Serial EKG and cardiac enzymes     ***Pulmonary***  *Make sure to add settings! - Remove if not applicable* [x] NC [x] BiPAP [x] HFO2 [x]  Nitric oxide     OR     *if has vent settings below* Post op vent management   Titration of FiO2 and PEEP, follow SpO2, CXR, blood gasses     Mode: standby              ***GI***  [x] Diet:   [x] Protonix  [x]  Pepcid    ***Renal***  [x] ROBI [x]  CKD [x] ESRD on HD   Continue to monitor I/Os, BUN/Creatinine.   Replete lytes PRN  Amezcua present *remove if none*    ***ID***  *summarize abx/indication*  *If no abxs* No active antibiotic coverage      ***Endocrine***  [x] Stress Hyperglycemia [x]  DM2 [x] DM1 [x] Prediabetes : HbA1c ____%                - [x] Insulin gtt  [x]  ISS  [x] NPH  [x]  Lantus             - Need tight glycemic control to prevent wound infection.      ALL MEDICATIONS (delete after use)  MEDICATIONS  (STANDING):  acetylcysteine 10%  Inhalation 4 milliLiter(s) Inhalation every 12 hours  albuterol/ipratropium for Nebulization 3 milliLiter(s) Nebulizer every 6 hours  apixaban 5 milliGRAM(s) Oral every 12 hours  ascorbic acid 500 milliGRAM(s) Oral daily  buDESOnide    Inhalation Suspension 0.5 milliGRAM(s) Inhalation every 12 hours  chlorhexidine 0.12% Liquid 15 milliLiter(s) Oral Mucosa every 12 hours  chlorhexidine 2% Cloths 1 Application(s) Topical <User Schedule>  collagenase Ointment 1 Application(s) Topical daily  dextrose 50% Injectable 50 milliLiter(s) IV Push every 15 minutes  dorzolamide 2% Ophthalmic Solution 1 Drop(s) Left EYE three times a day  fluconAZOLE IVPB 600 milliGRAM(s) IV Intermittent every 24 hours  furosemide    Tablet 40 milliGRAM(s) Oral daily  hydrALAZINE 50 milliGRAM(s) Oral every 8 hours  insulin lispro (ADMELOG) corrective regimen sliding scale   SubCutaneous every 6 hours  insulin NPH human recombinant 14 Unit(s) SubCutaneous <User Schedule>  insulin NPH human recombinant 10 Unit(s) SubCutaneous <User Schedule>  magnesium oxide 400 milliGRAM(s) Oral every 8 hours  meropenem  IVPB 1000 milliGRAM(s) IV Intermittent every 8 hours  meropenem  IVPB      methylPREDNISolone 8 milliGRAM(s) Oral daily  metoclopramide Injectable 5 milliGRAM(s) IV Push every 8 hours  metoprolol tartrate 12.5 milliGRAM(s) Enteral Tube every 12 hours  mexiletine 200 milliGRAM(s) Oral every 8 hours  multivitamin 1 Tablet(s) Oral daily  pantoprazole  Injectable 40 milliGRAM(s) IV Push daily  sodium chloride 0.9%. 1000 milliLiter(s) (10 mL/Hr) IV Continuous <Continuous>  timolol 0.5% Solution 1 Drop(s) Left EYE two times a day  vancomycin  IVPB 750 milliGRAM(s) IV Intermittent every 12 hours  vancomycin  IVPB        MEDICATIONS  (PRN):  acetaminophen    Suspension .. 650 milliGRAM(s) Oral every 6 hours PRN Temp greater or equal to 38C (100.4F), Mild Pain (1 - 3)      Patient requires continuous monitoring with bedside rhythm monitoring, pulse oximetry monitoring, and continuous central venous and arterial pressure monitoring; and intermittent blood gas analysis. Care plan discussed with the ICU care team.   Patient remained critical, at risk for life threatening decompensation.    I have spent 30 minutes providing critical care management to this patient.    By signing my name below, I, Mary Jo Hewitt, attest that this documentation has been prepared under the direction and in the presence of ___  Electronically signed: Rogers Gabriel, 10-27-22 @ 23:01    I, ___ , personally performed the services described in this documentation. all medical record entries made by the rogers were at my direction and in my presence. I have reviewed the chart and agree that the record reflects my personal performance and is accurate and complete  Electronically signed: ___ Patient seen and examined at the bedside.    Remained critically ill on continuous ICU monitoring.    OBJECTIVE:  Vital Signs Last 24 Hrs  T(C): 35.9 (27 Oct 2022 20:00), Max: 36.2 (27 Oct 2022 00:00)  T(F): 96.7 (27 Oct 2022 20:00), Max: 97.2 (27 Oct 2022 00:00)  HR: 72 (27 Oct 2022 22:00) (68 - 90)  BP: --  BP(mean): --  RR: 22 (27 Oct 2022 22:00) (16 - 37)  SpO2: 100% (27 Oct 2022 22:00) (97% - 100%)    Parameters below as of 27 Oct 2022 20:00  Patient On (Oxygen Delivery Method): tracheostomy collar    O2 Concentration (%): 40    Physical Exam:   General: Trach collar  Neurology: Nonfocal  ENT/Neck: + trach c/d/i, neck supple, trachea midline   Respiratory: coarse BS b/l, rhonchi throughout, minimal secretions  CV: S1S2, no murmurs        [x] Sinus Rhythm   Abdominal: Soft, NT, ND, colostomy in place (stoma intact)  Extremities: +4 RUE edema, 3+LUE edema, trace edema noted, + peripheral pulses   Skin: Dry dressing over right heel, R elbow wound w/ overlying cling dressing c/d/i                        Assessment:  71F PMH DM, COPD, chronic adrenal insufficiency on Prednisone, history of colorectal cancer s/p resection (colostomy bag), Hx of CAD, chronic AF on Eliquis, and tracheomalacia s/p multiple intubations, recent dx of R foot OM s/p debridement on antibiotics and presented to ED at [facility] this morning with epigastric pain, belching and central chest pain. Found on CTA to have type A aortic dissection and transferred to [facility] for surgical evaluation by Dr. Cabrera.     Ascending aortic dissection s/p type A dissection repair and Biobentall on 9/7   Respiratory failure s/p Trach 10/10/22  Hypovolemia   Post op respiratory failure   Acute blood loss anemia  Stress hyperglycemia   RIJ thrombus  MRSA PNA    Plan:   ***Neuro***  [x] Nonfocal  follows simple commands, continue to monitor  PT/OT progress as tolerated, ambulated with physical therapy    ***Cardiovascular***  Limited TTE on 10/1: EF 69%, Hyperdynamic left ventricular systolic function. There is hypokinesis of the mid-base inferior wall. Nml RV fxn.   CT chest on 9/28: No CT evidence of pulmonary embolism. Status post repair of ascending aortic dissection with a stable dissection flap originating from the aortic arch and extending into the infrarenal abdominal aorta,  B/l UE duplex on 10/5: As before, there is an occluded RIGHT IJ vein with thrombosis extending to brachiocephalic vein. Superficial thrombophlebitis of the LEFT cephalic vein.   Invasive hemodynamic monitoring, assess perfusion indices   SR / MAP 82/ Hct 33.1%/ Lactate 1.8  [x] Hydralazine PO for BP management   Rate control with Mexiletine and Lopressor   WVAC on rt elbow  changed 10/26  [x] AC Therapy with Eliquis 5 BID for Afib and IJ thrombus  Reassessment of hemodynamics    ***Pulmonary***  Trach collar 10L/40%  Respiratory failure s/p Trach #8 portex  10/10/22, per ENT inner cannula needs to be changed daily, trach sutures d/c'ed by ENT 10/17 , 40% trach collar for 2 days  Post op vent management   Titration of FiO2, follow SpO2, CXR, blood gases   Bronched 10/13  continue duonebs  Suction as needed   Aggressive Pulmonary toilet  Mode: OFF    ***GI***  [x] Diet:  TFs Glucerna 1.2 @ goal 65ml/hr, tolerating without issues (when glucerna 1.5 back in stock will switch and decrease goal rate to 55ml/hr)  [x] Protonix    Reglan for gut motility      ***Renal***  Continue to monitor I/Os, BUN/Creatinine.   Replete lytes PRN  Diuresis with Lasix     ***ID***  SCx on 10/4+Methicillin resistant Staphylococcus aureus, c/w IVPB Vancomycin, (plan for 6 week course in setting of valve surgery, 11/26 end date)  9/27 blood culture Neela Glabarata, on flucanazole (lifelong suppression)  BCx 10/13 NGTD, BC 10/21 and SCx NGTD  R elbow wound, S/p IR for elbow drainage 10/13 + Few Proteus mirabilis ESBL, Meropenem 7 day trial completed 10/19-> reordered 10/20 for reaccumulation of elbow brusitis--> now continuing. Ortho reconsulted and plastic surgery  10/22 plastics debrided R elbow eschar to subc tissue, ~8cc fluid aspirated and sent for culture. Wet to dry dressing changed 2x daily. PT put in VAC 10/25.  Joint Fluid Cx 10/22 NTD  Meropenem for 8 day course (until 10/28),started in setting of elbow collection 10/22, f/u ID for possible shorter course given culture NTD    ***Endocrine***  [x]  DM : HbA1c 9.4%                - [x] ISS  [x] NPH              - Need tight glycemic control to prevent wound infection.  Endocrine following, appreciate recommendations        Patient requires continuous monitoring with bedside rhythm monitoring, pulse oximetry monitoring, and continuous central venous and arterial pressure monitoring; and intermittent blood gas analysis. Care plan discussed with the ICU care team.   Patient remained critical, at risk for life threatening decompensation.    I have spent 35 minutes providing critical care management to this patient.    By signing my name below, I, Mary Jo Hewitt, attest that this documentation has been prepared under the direction and in the presence of Saleem Rico NP.  Electronically signed: Georgi Gabriel, 10-27-22 @ 23:01    I, Saleem Rico, personally performed the services described in this documentation. all medical record entries made by the scribe were at my direction and in my presence. I have reviewed the chart and agree that the record reflects my personal performance and is accurate and complete  Electronically signed: Saleem Rico NP.

## 2022-10-27 NOTE — PROGRESS NOTE ADULT - SUBJECTIVE AND OBJECTIVE BOX
[facility]-- WOUND TEAM -- FOLLOW UP NOTE  --------------------------------------------------------------------------------    24 hour events/subjective:      alert  afebrile  pt working with PT- walked today  tolerating TF w/o vomiting  s//p IR aspiration of rt elbow fluid collection  s/p Plastic Surgery debridement of Rt elbow wound      Diet:  Diet, NPO with Tube Feed:   Tube Feeding Modality: Nasogastric  Glucerna 1.2 Chago (GLUCERNARTH)  Total Volume for 24 Hours (mL): 1560  Continuous  Starting Tube Feed Rate mL per Hour: 65  Until Goal Tube Feed Rate (mL per Hour): 65  Tube Feed Duration (in Hours): 24  Tube Feed Start Time: 08:20  No Carb Prosource TF     Qty per Day:  1 (10-25-22 @ 15:15)      ROS: General/ SKIN/ MSK see HPI  all other systems negative      ALLERGIES & MEDICATIONS  --------------------------------------------------------------------------------  Allergies  aspirin (Short breath)  Avelox (Short breath; Pruritus)  cefepime (Anaphylaxis)  codeine (Short breath)  Dilaudid (Short breath)  iodine (Short breath; Swelling)  penicillin (Anaphylaxis)  shellfish (Anaphylaxis)  tetanus toxoid (Short breath)  Valium (Short breath)      STANDING INPATIENT MEDICATIONS  acetylcysteine 10%  Inhalation 4 milliLiter(s) Inhalation every 12 hours  albuterol/ipratropium for Nebulization 3 milliLiter(s) Nebulizer every 6 hours  apixaban 5 milliGRAM(s) Oral every 12 hours  ascorbic acid 500 milliGRAM(s) Oral daily  buDESOnide    Inhalation Suspension 0.5 milliGRAM(s) Inhalation every 12 hours  chlorhexidine 0.12% Liquid 15 milliLiter(s) Oral Mucosa every 12 hours  chlorhexidine 2% Cloths 1 Application(s) Topical <User Schedule>  collagenase Ointment 1 Application(s) Topical daily  dextrose 50% Injectable 50 milliLiter(s) IV Push every 15 minutes  dorzolamide 2% Ophthalmic Solution 1 Drop(s) Left EYE three times a day  fluconAZOLE IVPB 600 milliGRAM(s) IV Intermittent every 24 hours  furosemide Tablet 40 milliGRAM(s) Oral daily  hydrALAZINE 50 milliGRAM(s) Oral every 8 hours  insulin lispro (ADMELOG) corrective regimen sliding scale   SubCutaneous every 6 hours  insulin NPH human recombinant 14 Unit(s) SubCutaneous <User Schedule>  insulin NPH human recombinant 10 Unit(s) SubCutaneous <User Schedule>  magnesium oxide 400 milliGRAM(s) Oral every 8 hours  meropenem  IVPB 1000 milliGRAM(s) IV Intermittent every 8 hours  methylPREDNISolone 8 milliGRAM(s) Oral daily  metoclopramide Injectable 5 milliGRAM(s) IV Push every 8 hours  metoprolol tartrate 12.5 milliGRAM(s) Enteral Tube every 12 hours  mexiletine 200 milliGRAM(s) Oral every 8 hours  multivitamin 1 Tablet(s) Oral daily  pantoprazole  Injectable 40 milliGRAM(s) IV Push daily  sodium chloride 0.9%. 1000 milliLiter(s) IV Continuous <Continuous>  timolol 0.5% Solution 1 Drop(s) Left EYE two times a day  vancomycin  IVPB 750 milliGRAM(s) IV Intermittent every 12 hours      PRN INPATIENT MEDICATION  acetaminophen  Suspension  650 milliGRAM(s) Oral every 6 hours PRN        VITALS/PHYSICAL EXAM  --------------------------------------------------------------------------------  T(C): 36.2 (10-27-22 @ 16:00), Max: 36.3 (10-26-22 @ 20:00)  HR: 71 (10-27-22 @ 18:00) (68 - 90)  BP: --  RR: 21 (10-27-22 @ 18:00) (16 - 33)  SpO2: 98% (10-27-22 @ 18:00) (97% - 100%)  Wt(kg): --        10-26-22 @ 07:01  -  10-27-22 @ 07:00  --------------------------------------------------------  IN: 2810 mL / OUT: 2200 mL / NET: 610 mL    10-27-22 @ 07:01  -  10-27-22 @ 18:36  --------------------------------------------------------  IN: 1230 mL / OUT: 1740 mL / NET: -510 mL    General: Guarded, but stable, Alert, obese  Total Care Sport  HEENT: NC/AT, clear sclera, trach collar midline, mucous membranes moist  Neurology: weakened strength, sensation grossly intact, following commands  Psych: calm, appropriate  Musculoskeletal: FROM, no contractures, no deformities     Right elbow wound - VAC in place w/ good seal  Vascular: BLE edema equal,  RUE(+)radial / ulnar pulses  Skin: dry, good turgor  Sacral/bilateral buttocks evolving DTI    hypopigmented intact skin in and gluteal cleft  2cm X  1cm x  0cm    pink wound bed, no necrotic tissue,     no blistering or drainage, no fluctuance, no erythema, odor, increased warmth, tenderness, induration, nor crepitus        LABS/ CULTURES/ RADIOLOGY:              9.0    14.37 >-----------<  293      [10-27-22 @ 01:06]              31.3     139  |  100  |  26  ----------------------------<  236      [10-27-22 @ 01:06]  4.2   |  27  |  0.49        Ca     9.3     [10-27-22 @ 01:06]      Mg     2.0     [10-27-22 @ 01:06]      Phos  3.1     [10-27-22 @ 01:06]    TPro  7.2  /  Alb  3.1  /  TBili  0.2  /  DBili  x   /  AST  18  /  ALT  17  /  AlkPhos  129  [10-27-22 @ 01:06]      CAPILLARY BLOOD GLUCOSE  POCT Blood Glucose.: 202 mg/dL (27 Oct 2022 17:07)  POCT Blood Glucose.: 210 mg/dL (27 Oct 2022 15:50)  POCT Blood Glucose.: 308 mg/dL (27 Oct 2022 13:24)  POCT Blood Glucose.: 344 mg/dL (27 Oct 2022 11:53)  POCT Blood Glucose.: 127 mg/dL (27 Oct 2022 05:51)  POCT Blood Glucose.: 225 mg/dL (27 Oct 2022 00:12)    Culture - Blood (collected 10-21-22 @ 05:05)  Source: .Blood Blood-Peripheral  Final Report (10-26-22 @ 12:00):    No Growth Final    Culture - Blood (collected 10-21-22 @ 05:04)  Source: .Blood Blood  Final Report (10-26-22 @ 12:00):    No Growth Final    A1C with Estimated Average Glucose Result: 9.4 % (09-06-22 @ 16:46)  A1C with Estimated Average Glucose Result: 9.2 % (07-11-22 @ 07:36)  A1C with Estimated Average Glucose Result: 8.0 % (05-10-22 @ 12:26)

## 2022-10-27 NOTE — PROGRESS NOTE ADULT - ASSESSMENT

## 2022-10-27 NOTE — PROGRESS NOTE ADULT - NUTRITIONAL ASSESSMENT
Diet, NPO with Tube Feed:   Tube Feeding Modality: Nasogastric  Glucerna 1.2 Chago (GLUCERNARTH)  Total Volume for 24 Hours (mL): 1560  Continuous  Starting Tube Feed Rate {mL per Hour}: 65  Until Goal Tube Feed Rate (mL per Hour): 65  Tube Feed Duration (in Hours): 24  Tube Feed Start Time: 08:20  No Carb Prosource TF     Qty per Day:  1 (10-25-22 @ 15:15) [Active]

## 2022-10-27 NOTE — PROGRESS NOTE ADULT - SUBJECTIVE AND OBJECTIVE BOX
seen earlier today     Chief Complaint: Type 2 Diabetes Mellitus     INTERVAL HX:  pt nodding yes/no to questions; remains on glucerna tf at goal, BG without pattern but mostly above goal, 6am BG at goal 127 , per flowsheet TF running overnight at goal . CTU team increased NPH to 15 units     Review of Systems:  General: As above  Cardiovascular: No chest pain  Respiratory: No SOB  GI: No nausea, vomiting  Endocrine: no  S&Sx of hypoglycemia    Allergies    aspirin (Short breath)  Avelox (Short breath; Pruritus)  cefepime (Anaphylaxis)  codeine (Short breath)  Dilaudid (Short breath)  iodine (Short breath; Swelling)  penicillin (Anaphylaxis)  shellfish (Anaphylaxis)  tetanus toxoid (Short breath)  Valium (Short breath)    Intolerances      MEDICATIONS  (STANDING):  acetylcysteine 10%  Inhalation 4 milliLiter(s) Inhalation every 12 hours  albuterol/ipratropium for Nebulization 3 milliLiter(s) Nebulizer every 6 hours  apixaban 5 milliGRAM(s) Oral every 12 hours  ascorbic acid 500 milliGRAM(s) Oral daily  buDESOnide    Inhalation Suspension 0.5 milliGRAM(s) Inhalation every 12 hours  chlorhexidine 0.12% Liquid 15 milliLiter(s) Oral Mucosa every 12 hours  chlorhexidine 2% Cloths 1 Application(s) Topical <User Schedule>  collagenase Ointment 1 Application(s) Topical daily  dextrose 50% Injectable 50 milliLiter(s) IV Push every 15 minutes  dorzolamide 2% Ophthalmic Solution 1 Drop(s) Left EYE three times a day  fluconAZOLE IVPB 600 milliGRAM(s) IV Intermittent every 24 hours  furosemide    Tablet 40 milliGRAM(s) Oral daily  hydrALAZINE 50 milliGRAM(s) Oral every 8 hours  insulin lispro (ADMELOG) corrective regimen sliding scale   SubCutaneous every 6 hours  insulin NPH human recombinant 15 Unit(s) SubCutaneous every 6 hours  magnesium oxide 400 milliGRAM(s) Oral every 8 hours  meropenem  IVPB      meropenem  IVPB 1000 milliGRAM(s) IV Intermittent every 8 hours  methylPREDNISolone 8 milliGRAM(s) Oral daily  metoclopramide Injectable 5 milliGRAM(s) IV Push every 8 hours  metoprolol tartrate 12.5 milliGRAM(s) Enteral Tube every 12 hours  mexiletine 200 milliGRAM(s) Oral every 8 hours  multivitamin 1 Tablet(s) Oral daily  pantoprazole  Injectable 40 milliGRAM(s) IV Push daily  sodium chloride 0.9%. 1000 milliLiter(s) (10 mL/Hr) IV Continuous <Continuous>  timolol 0.5% Solution 1 Drop(s) Left EYE two times a day  vancomycin  IVPB 750 milliGRAM(s) IV Intermittent every 12 hours  vancomycin  IVPB            insulin lispro (ADMELOG) corrective regimen sliding scale   8 Unit(s) SubCutaneous (10-27-22 @ 11:58)   4 Unit(s) SubCutaneous (10-27-22 @ 00:14)   6 Unit(s) SubCutaneous (10-26-22 @ 17:20)    insulin NPH human recombinant   10 Unit(s) SubCutaneous (10-27-22 @ 11:57)   10 Unit(s) SubCutaneous (10-27-22 @ 05:52)   10 Unit(s) SubCutaneous (10-27-22 @ 00:15)   10 Unit(s) SubCutaneous (10-26-22 @ 17:20)    insulin NPH human recombinant   8 Unit(s) SubCutaneous (10-27-22 @ 13:16)    methylPREDNISolone   8 milliGRAM(s) Oral (10-27-22 @ 06:05)        PHYSICAL EXAM:  VITALS: T(C): 36.2 (10-27-22 @ 12:00)  T(F): 97.1 (10-27-22 @ 12:00), Max: 97.4 (10-26-22 @ 16:00)  HR: 77 (10-27-22 @ 14:00) (68 - 90)  BP: --  RR:  (16 - 33)  SpO2:  (96% - 100%)  Wt(kg): --  GENERAL: female sitting in chair in NAD, NGT with TF running   Respiratory: Respirations unlabored   Extremities: Warm, no edema  NEURO: Alert , appropriate       LABS:    POCT Blood Glucose.: 308 mg/dL (10-27-22 @ 13:24)  POCT Blood Glucose.: 344 mg/dL (10-27-22 @ 11:53)  POCT Blood Glucose.: 127 mg/dL (10-27-22 @ 05:51)  POCT Blood Glucose.: 225 mg/dL (10-27-22 @ 00:12)  POCT Blood Glucose.: 282 mg/dL (10-26-22 @ 17:08)  POCT Blood Glucose.: 210 mg/dL (10-26-22 @ 11:17)  POCT Blood Glucose.: 216 mg/dL (10-26-22 @ 06:57)  POCT Blood Glucose.: 146 mg/dL (10-25-22 @ 23:59)  POCT Blood Glucose.: 183 mg/dL (10-25-22 @ 17:42)  POCT Blood Glucose.: 189 mg/dL (10-25-22 @ 12:43)  POCT Blood Glucose.: 107 mg/dL (10-25-22 @ 06:03)  POCT Blood Glucose.: 160 mg/dL (10-25-22 @ 00:38)  POCT Blood Glucose.: 195 mg/dL (10-24-22 @ 17:09)                            9.0    14.37 )-----------( 293      ( 27 Oct 2022 01:06 )             31.3       10-27    139  |  100  |  26<H>  ----------------------------<  236<H>  4.2   |  27  |  0.49<L>    Ca    9.3      27 Oct 2022 01:06  Phos  3.1     10-27  Mg     2.0     10-27    TPro  7.2  /  Alb  3.1<L>  /  TBili  0.2  /  DBili  x   /  AST  18  /  ALT  17  /  AlkPhos  129<H>  10-27      eGFR: 99 mL/min/1.73m2 (27 Oct 2022 01:06)          Thyroid Function Tests:  10-03 @ 04:47 TSH 0.32 FreeT4 -- T3 -- Anti TPO -- Anti Thyroglobulin Ab -- TSI --          A1C with Estimated Average Glucose Result: 9.4 % (09-06-22 @ 16:46)      Estimated Average Glucose: 223 mg/dL (09-06-22 @ 16:46)

## 2022-10-27 NOTE — PROGRESS NOTE ADULT - TIME BILLING
as above: no new events-TC continues- (she will always have them)-s/p  trach 10/10-s/p  resp failure-sepsis joztauczdz-ENYT-cv ABX-stable CV status-continue VEST rx if ok w/ CTS  multifactorial dyspnea-resp failure-severe persistent asthma, TBM s/p tracheoplasty, s/p Aortic aneurysm repair, Bronchitis (proteus)-O2-keep 90%;TC  severe persistent asthma--medrol 8mg, singulair 10, duoneb q 6, budes .5 bid, tezspire 8/29-was due 9/29  TBM-s/p tracheoplasty--accapella, vest rx, mucomyst here 2 cc 10% q 8 (secretions)  s/p Aortic aneurysm repair--as per CTS staff care  AF-on heparin--eventual eliquis rx               CV-improved cardene-MAP above 60  DVT R-IJ-on heparin rx  *****ID-s/p proteus bronchitis-s/p meropenum changed to ceftriaxone and back to meropenem/vanco- off of ABX as of 9/19--restart of ABX 9/27-meropenem/vanco-NOW on meropenem and vanco for MRSE sepsis (11/26 end date for vanco), plastics/ortho for elbow-proteus (?wash out needed)  PT-OOB as able                             GI-TF as able--f/up LFTs     ORTHO f/up--? additional wash out of elbow wound?  VC dysfunction--aspiration precautions-s/p trach 10/10  GOC                                    Heme onc f/up colon ca  prog--critical in CTU                PT-to continue-more OOB    Eulalio Allison MD-Pulmonary   [phone]

## 2022-10-28 LAB
ALBUMIN SERPL ELPH-MCNC: 3.3 G/DL — SIGNIFICANT CHANGE UP (ref 3.3–5)
ALP SERPL-CCNC: 130 U/L — HIGH (ref 40–120)
ALT FLD-CCNC: 17 U/L — SIGNIFICANT CHANGE UP (ref 10–45)
ANION GAP SERPL CALC-SCNC: 13 MMOL/L — SIGNIFICANT CHANGE UP (ref 5–17)
AST SERPL-CCNC: 17 U/L — SIGNIFICANT CHANGE UP (ref 10–40)
BILIRUB SERPL-MCNC: 0.3 MG/DL — SIGNIFICANT CHANGE UP (ref 0.2–1.2)
BUN SERPL-MCNC: 24 MG/DL — HIGH (ref 7–23)
CALCIUM SERPL-MCNC: 9.5 MG/DL — SIGNIFICANT CHANGE UP (ref 8.4–10.5)
CHLORIDE SERPL-SCNC: 100 MMOL/L — SIGNIFICANT CHANGE UP (ref 96–108)
CO2 SERPL-SCNC: 27 MMOL/L — SIGNIFICANT CHANGE UP (ref 22–31)
CREAT SERPL-MCNC: 0.49 MG/DL — LOW (ref 0.5–1.3)
EGFR: 99 ML/MIN/1.73M2 — SIGNIFICANT CHANGE UP
GAS PNL BLDA: SIGNIFICANT CHANGE UP
GAS PNL BLDA: SIGNIFICANT CHANGE UP
GLUCOSE BLDC GLUCOMTR-MCNC: 148 MG/DL — HIGH (ref 70–99)
GLUCOSE BLDC GLUCOMTR-MCNC: 205 MG/DL — HIGH (ref 70–99)
GLUCOSE BLDC GLUCOMTR-MCNC: 335 MG/DL — HIGH (ref 70–99)
GLUCOSE BLDC GLUCOMTR-MCNC: 336 MG/DL — HIGH (ref 70–99)
GLUCOSE BLDC GLUCOMTR-MCNC: 56 MG/DL — LOW (ref 70–99)
GLUCOSE BLDC GLUCOMTR-MCNC: 56 MG/DL — LOW (ref 70–99)
GLUCOSE SERPL-MCNC: 165 MG/DL — HIGH (ref 70–99)
HCT VFR BLD CALC: 31.8 % — LOW (ref 34.5–45)
HGB BLD-MCNC: 9.4 G/DL — LOW (ref 11.5–15.5)
MAGNESIUM SERPL-MCNC: 2.2 MG/DL — SIGNIFICANT CHANGE UP (ref 1.6–2.6)
MCHC RBC-ENTMCNC: 23.2 PG — LOW (ref 27–34)
MCHC RBC-ENTMCNC: 29.6 GM/DL — LOW (ref 32–36)
MCV RBC AUTO: 78.3 FL — LOW (ref 80–100)
NRBC # BLD: 0 /100 WBCS — SIGNIFICANT CHANGE UP (ref 0–0)
PHOSPHATE SERPL-MCNC: 3.1 MG/DL — SIGNIFICANT CHANGE UP (ref 2.5–4.5)
PLATELET # BLD AUTO: 341 K/UL — SIGNIFICANT CHANGE UP (ref 150–400)
POTASSIUM SERPL-MCNC: 4.2 MMOL/L — SIGNIFICANT CHANGE UP (ref 3.5–5.3)
POTASSIUM SERPL-SCNC: 4.2 MMOL/L — SIGNIFICANT CHANGE UP (ref 3.5–5.3)
PROT SERPL-MCNC: 7.4 G/DL — SIGNIFICANT CHANGE UP (ref 6–8.3)
RBC # BLD: 4.06 M/UL — SIGNIFICANT CHANGE UP (ref 3.8–5.2)
RBC # FLD: 22.8 % — HIGH (ref 10.3–14.5)
SODIUM SERPL-SCNC: 140 MMOL/L — SIGNIFICANT CHANGE UP (ref 135–145)
VANCOMYCIN TROUGH SERPL-MCNC: 13.7 UG/ML — SIGNIFICANT CHANGE UP (ref 10–20)
VANCOMYCIN TROUGH SERPL-MCNC: 14 UG/ML — SIGNIFICANT CHANGE UP (ref 10–20)
WBC # BLD: 16.1 K/UL — HIGH (ref 3.8–10.5)
WBC # FLD AUTO: 16.1 K/UL — HIGH (ref 3.8–10.5)

## 2022-10-28 PROCEDURE — 71045 X-RAY EXAM CHEST 1 VIEW: CPT | Mod: 26

## 2022-10-28 PROCEDURE — 99291 CRITICAL CARE FIRST HOUR: CPT | Mod: 24

## 2022-10-28 PROCEDURE — 99232 SBSQ HOSP IP/OBS MODERATE 35: CPT

## 2022-10-28 RX ORDER — HUMAN INSULIN 100 [IU]/ML
10 INJECTION, SUSPENSION SUBCUTANEOUS
Refills: 0 | Status: DISCONTINUED | OUTPATIENT
Start: 2022-10-29 | End: 2022-11-01

## 2022-10-28 RX ORDER — HUMAN INSULIN 100 [IU]/ML
16 INJECTION, SUSPENSION SUBCUTANEOUS
Refills: 0 | Status: DISCONTINUED | OUTPATIENT
Start: 2022-10-29 | End: 2022-10-31

## 2022-10-28 RX ORDER — HUMAN INSULIN 100 [IU]/ML
20 INJECTION, SUSPENSION SUBCUTANEOUS
Refills: 0 | Status: DISCONTINUED | OUTPATIENT
Start: 2022-10-29 | End: 2022-10-29

## 2022-10-28 RX ORDER — HUMAN INSULIN 100 [IU]/ML
10 INJECTION, SUSPENSION SUBCUTANEOUS
Refills: 0 | Status: DISCONTINUED | OUTPATIENT
Start: 2022-10-28 | End: 2022-10-29

## 2022-10-28 RX ORDER — MAGNESIUM SULFATE 500 MG/ML
2 VIAL (ML) INJECTION ONCE
Refills: 0 | Status: COMPLETED | OUTPATIENT
Start: 2022-10-28 | End: 2022-10-28

## 2022-10-28 RX ADMIN — Medication 25 GRAM(S): at 01:30

## 2022-10-28 RX ADMIN — HUMAN INSULIN 14 UNIT(S): 100 INJECTION, SUSPENSION SUBCUTANEOUS at 13:23

## 2022-10-28 RX ADMIN — Medication 50 MILLIGRAM(S): at 21:36

## 2022-10-28 RX ADMIN — APIXABAN 5 MILLIGRAM(S): 2.5 TABLET, FILM COATED ORAL at 18:12

## 2022-10-28 RX ADMIN — Medication 500 MILLIGRAM(S): at 13:07

## 2022-10-28 RX ADMIN — Medication 650 MILLIGRAM(S): at 15:52

## 2022-10-28 RX ADMIN — Medication 4 MILLILITER(S): at 06:03

## 2022-10-28 RX ADMIN — MEXILETINE HYDROCHLORIDE 200 MILLIGRAM(S): 150 CAPSULE ORAL at 21:36

## 2022-10-28 RX ADMIN — Medication 5 MILLIGRAM(S): at 13:06

## 2022-10-28 RX ADMIN — Medication 3 MILLILITER(S): at 17:11

## 2022-10-28 RX ADMIN — Medication 3 MILLILITER(S): at 23:50

## 2022-10-28 RX ADMIN — HUMAN INSULIN 14 UNIT(S): 100 INJECTION, SUSPENSION SUBCUTANEOUS at 05:13

## 2022-10-28 RX ADMIN — Medication 0.5 MILLIGRAM(S): at 17:11

## 2022-10-28 RX ADMIN — Medication 0.5 MILLIGRAM(S): at 06:02

## 2022-10-28 RX ADMIN — Medication 3 MILLILITER(S): at 05:59

## 2022-10-28 RX ADMIN — Medication 12.5 MILLIGRAM(S): at 18:11

## 2022-10-28 RX ADMIN — DORZOLAMIDE HYDROCHLORIDE 1 DROP(S): 20 SOLUTION/ DROPS OPHTHALMIC at 21:37

## 2022-10-28 RX ADMIN — Medication 1 APPLICATION(S): at 13:25

## 2022-10-28 RX ADMIN — Medication 650 MILLIGRAM(S): at 15:22

## 2022-10-28 RX ADMIN — DORZOLAMIDE HYDROCHLORIDE 1 DROP(S): 20 SOLUTION/ DROPS OPHTHALMIC at 05:12

## 2022-10-28 RX ADMIN — HUMAN INSULIN 10 UNIT(S): 100 INJECTION, SUSPENSION SUBCUTANEOUS at 00:00

## 2022-10-28 RX ADMIN — MEXILETINE HYDROCHLORIDE 200 MILLIGRAM(S): 150 CAPSULE ORAL at 05:10

## 2022-10-28 RX ADMIN — Medication 1 TABLET(S): at 13:07

## 2022-10-28 RX ADMIN — MEROPENEM 100 MILLIGRAM(S): 1 INJECTION INTRAVENOUS at 13:07

## 2022-10-28 RX ADMIN — Medication 1 DROP(S): at 05:12

## 2022-10-28 RX ADMIN — MAGNESIUM OXIDE 400 MG ORAL TABLET 400 MILLIGRAM(S): 241.3 TABLET ORAL at 13:06

## 2022-10-28 RX ADMIN — Medication 250 MILLIGRAM(S): at 00:00

## 2022-10-28 RX ADMIN — CHLORHEXIDINE GLUCONATE 1 APPLICATION(S): 213 SOLUTION TOPICAL at 05:43

## 2022-10-28 RX ADMIN — MEROPENEM 100 MILLIGRAM(S): 1 INJECTION INTRAVENOUS at 21:36

## 2022-10-28 RX ADMIN — MEXILETINE HYDROCHLORIDE 200 MILLIGRAM(S): 150 CAPSULE ORAL at 13:06

## 2022-10-28 RX ADMIN — Medication 1 DROP(S): at 18:27

## 2022-10-28 RX ADMIN — MAGNESIUM OXIDE 400 MG ORAL TABLET 400 MILLIGRAM(S): 241.3 TABLET ORAL at 05:10

## 2022-10-28 RX ADMIN — Medication 2: at 00:01

## 2022-10-28 RX ADMIN — Medication 250 MILLIGRAM(S): at 10:18

## 2022-10-28 RX ADMIN — Medication 250 MILLIGRAM(S): at 23:00

## 2022-10-28 RX ADMIN — Medication 4 MILLILITER(S): at 17:11

## 2022-10-28 RX ADMIN — MAGNESIUM OXIDE 400 MG ORAL TABLET 400 MILLIGRAM(S): 241.3 TABLET ORAL at 21:36

## 2022-10-28 RX ADMIN — DORZOLAMIDE HYDROCHLORIDE 1 DROP(S): 20 SOLUTION/ DROPS OPHTHALMIC at 13:36

## 2022-10-28 RX ADMIN — Medication 4: at 18:13

## 2022-10-28 RX ADMIN — Medication 50 MILLILITER(S): at 23:50

## 2022-10-28 RX ADMIN — Medication 50 MILLIGRAM(S): at 05:10

## 2022-10-28 RX ADMIN — Medication 5 MILLIGRAM(S): at 21:36

## 2022-10-28 RX ADMIN — HUMAN INSULIN 10 UNIT(S): 100 INJECTION, SUSPENSION SUBCUTANEOUS at 18:12

## 2022-10-28 RX ADMIN — Medication 3 MILLILITER(S): at 11:28

## 2022-10-28 RX ADMIN — Medication 3 MILLILITER(S): at 00:08

## 2022-10-28 RX ADMIN — CHLORHEXIDINE GLUCONATE 15 MILLILITER(S): 213 SOLUTION TOPICAL at 05:09

## 2022-10-28 RX ADMIN — Medication 12.5 MILLIGRAM(S): at 05:10

## 2022-10-28 RX ADMIN — FLUCONAZOLE 150 MILLIGRAM(S): 150 TABLET ORAL at 20:35

## 2022-10-28 RX ADMIN — Medication 40 MILLIGRAM(S): at 05:10

## 2022-10-28 RX ADMIN — APIXABAN 5 MILLIGRAM(S): 2.5 TABLET, FILM COATED ORAL at 05:09

## 2022-10-28 RX ADMIN — Medication 5 MILLIGRAM(S): at 05:11

## 2022-10-28 RX ADMIN — CHLORHEXIDINE GLUCONATE 15 MILLILITER(S): 213 SOLUTION TOPICAL at 18:12

## 2022-10-28 RX ADMIN — PANTOPRAZOLE SODIUM 40 MILLIGRAM(S): 20 TABLET, DELAYED RELEASE ORAL at 13:06

## 2022-10-28 RX ADMIN — Medication 8: at 13:24

## 2022-10-28 RX ADMIN — MEROPENEM 100 MILLIGRAM(S): 1 INJECTION INTRAVENOUS at 05:11

## 2022-10-28 RX ADMIN — Medication 8 MILLIGRAM(S): at 05:09

## 2022-10-28 RX ADMIN — Medication 50 MILLIGRAM(S): at 13:07

## 2022-10-28 NOTE — PROGRESS NOTE ADULT - PROBLEM SELECTOR PLAN 1
-test BG q6h if NPO/TF   -Adjust NPH to NPH 10-20-16-10 units q6h for now. If BG <100 can administer 50% of the dose. HOLD IF TFS OFF  -C/w Admelog moderate correction scale q6h for now  Discharge plan:  - Likely to discharge patient on basal/bolus insulin. Final regimen pending clinical course.  - Recommend routine outpatient ophthalmology, podiatry  - Can f/u with endocrinologist Dr. Saavedra.  - Make sure pt has Rx for all DM supplies and insulin/ DM meds.  -Based on pt's clinical condition and mental status at this time she is not able to independently manage her DM. Will evaluate pt's ability to manage DM at time of discharge. If unable> pt will need family/ care giver (s) to manage DM care at home  -Will need rehab.

## 2022-10-28 NOTE — PROGRESS NOTE ADULT - TIME BILLING
as above: wound care f/up-TC continues- (she will always have secretions) due to TBM-s/p  trach 10/10-s/p  resp failure-s/p ehgdlo-TJYL-ke ABX-stable CV status-continue VEST rx if ok w/ CTS  multifactorial dyspnea-resp failure-severe persistent asthma, TBM s/p tracheoplasty, s/p Aortic aneurysm repair, Bronchitis (proteus)-O2-keep 90%;TC  severe persistent asthma--medrol 8mg, singulair 10, duoneb q 6, budes .5 bid, tezspire 8/29-was due 9/29  TBM-s/p tracheoplasty--accapella, vest rx, mucomyst here 2 cc 10% q 8 (secretions)  s/p Aortic aneurysm repair--as per CTS staff care  AF-on heparin--eventual eliquis rx               CV-improved cardene-MAP above 60  DVT R-IJ-on heparin rx  *****ID-s/p proteus bronchitis-s/p meropenum changed to ceftriaxone and back to meropenem/vanco- off of ABX as of 9/19--restart of ABX 9/27-meropenem/vanco-NOW on meropenem and vanco for MRSE sepsis (11/26 end date for vanco), plastics/ortho for elbow-proteus (?wash out needed)  PT-OOB as able                             GI-TF as able--f/up LFTs     ORTHO f/up--? additional wash out of elbow wound?; wound care f/up  VC dysfunction--aspiration precautions-s/p trach 10/10  GOC                                    Heme onc f/up colon ca  prog--critical in CTU                PT-to continue-more OOB    Eulalio Allison MD-Pulmonary   [phone] as above: wound care f/up-TC continues- (she will always have secretions) due to TBM-s/p  trach 10/10-s/p  resp failure-s/p duydro-YSZG-lc ABX-stable CV status-continue VEST rx if ok w/ CTS  multifactorial dyspnea-resp failure-severe persistent asthma, TBM s/p tracheoplasty, s/p Aortic aneurysm repair, Bronchitis (proteus)-O2-keep 90%;TC  severe persistent asthma--medrol 8mg, singulair 10, duoneb q 6, budes .5 bid, tezspire 8/29-was due 9/29  TBM-s/p tracheoplasty--accapella, vest rx, mucomyst here 2 cc 10% q 8 (secretions)  s/p Aortic aneurysm repair--as per CTS staff care  AF-on eliquis rx               CV-improved cardene-MAP above 60  DVT R-IJ-on heparin rx  *****ID-s/p proteus bronchitis-s/p meropenum changed to ceftriaxone and back to meropenem/vanco- off of ABX as of 9/19--restart of ABX 9/27-meropenem/vanco-NOW on meropenem and vanco for MRSE sepsis (11/26 end date for vanco), plastics/ortho for elbow-proteus (?wash out needed)  PT-OOB as able                             GI-TF as able--f/up LFTs     ORTHO f/up--? additional wash out of elbow wound?; wound care f/up  VC dysfunction--aspiration precautions-s/p trach 10/10  GOC                                    Heme onc f/up colon ca  prog--critical in CTU                PT-to continue-more OOB    Eulalio Allison MD-Pulmonary   [phone]

## 2022-10-28 NOTE — PROGRESS NOTE ADULT - ASSESSMENT
71F w/h/o uncontrolled T2DM (A1C 9.4%) on basal/bolus insulin PTA. Unknown DM complications. Also COPD, secondary adrenal Insufficiency on chronic steroids, colorectal cancer s/p resection (colostomy bag), Chronic A fib on Eliquis, and tracheomalacia and multiple intubations, recent dx of OM presented to ED at [facility] with epigastric pain, belching and central chest pain. Found on CTA to have type A aortic dissection and transferred to [facility] for surgical evaluation by Dr. Cabrera. Now s/p aortic dissection repair on 9/07/22. Endocrine consulted for assistance with uncontrolled DM/steroids for AI. Pt in ICU with ventricular dysfunction/sepsis, and now s/p trach 10/10 and more recently s/p R elbow eschar debridement 10/22 > Wound VAC 10/24. Tolerating TFs with BG levels variable while on present NPH insulin doses. Noted BG levesl higher after pt taking morning steroid. Will continue to adjust insulin doses to BG goal 100 um136y.Pt remains with leukocytosis and resolved transaminitis. On Prednisone dose 8mg for AI.

## 2022-10-28 NOTE — PROGRESS NOTE ADULT - NSPROGADDITIONALINFOA_GEN_ALL_CORE
-Plan discussed with pt/team.  Contact info: [phone] (24/7). pager [phone]  [email or web address]  Teams  Spent 28 minutes assessing pt/labs/meds and discussing plan of care with primary team  Adjusting insulin  Discharge plan  Follow up care

## 2022-10-28 NOTE — PROGRESS NOTE ADULT - ASSESSMENT
71 year-old female with a history of DM2, CAD, A-Fib on apixaban, Severe Persistent Asthma (on chronic steroids, recently started on Tezspire), colon cancer s/p resection/chemo, and tracheobronchomalacia s/p tracheoplasty, and recent OM of the R foot s/b debridement and completed course of Vancomycin/Ertapenem for MRSA/ESBL Proteus/Corynebacterium who now presents with chest pain, found to have Type A dissection s/p repair with modified Bentall procedure and hemiarch replacement on 9/6/22. Post-op course complicated by acute hypoxemic respiratory failure, shock, anemia, and hyperglycemia requiring insulin gtt. Extubated 9/13, now awake and alert with mild encephalopathy but overall significantly improved.    Assessment:  Acute hypoxemic respiratory failure requiring intubation  Type A Aortic Dissection  Severe Persistent Asthma  History of Tracheobronchomalacia  fevers and MSSA bacteremia and tracheal aspirate material with MSSA    -leukocytosis  Fevers and MRSA bacteremia last + culture 9/28  last positive Candida blood culture 9/30  Continue IV vancomycin- trough 14. on 10/28 is therapeutic maintain current dosing   Right elbow growing ESBL Proteus -continue Meropenem and she may need further drainage or a washout to the joint given persistent positive cultures and leukocytosis        MRSA  bacteremia and candidemia  Will plan to treat for 4-6 weeks in the setting of post surgical repair of thoracic aorta dissection  Continue to follow CBC  Continue to follow creatinine  Continue to follow Vanco trough levels  repeat blood cultures for evidence of fungemia and bacteremia clearance show evidence of clearing of infections  ESBL proteus in elbow fluid sensitive to meropenem and more recent aspiration 10/22 is again growing Proteus ESBL type- may need a more definitive washout of bursa to eradicate infection  IF leukocytosis continues to increase would repeat blood cultures      Jeff Calixto MD  Can be called via Teams  After 5pm/weekends [phone]

## 2022-10-28 NOTE — PROGRESS NOTE ADULT - SUBJECTIVE AND OBJECTIVE BOX
DIABETES FOLLOW UP NOTE: Saw pt earlier today    Chief Complaint: Endocrine consult requested for management of T2DM    INTERVAL HX: Saw pt in CTU, able to nod yes/no to questions. Per PT, pt was able to walk outside her room today. Toleratign TFs of Glucerna at 65cc/hr with BG levels still not at goal 100s to 300s in the last 24 hours while on present insulin doses even though NPH insulin was adjusted yesterday.  No hypoglycemia. No further emesis. No hypoglycemia.  Remains on antibiotics for sepsis           Review of Systems:  General: As above  Cardiovascular: No chest pain, palpitations  Respiratory: No SOB, no cough  GI: No nausea, vomiting, abdominal pain  Endocrine: no polyuria, polydipsia or S&Sx of hypoglycemia    Allergies    aspirin (Short breath)  Avelox (Short breath; Pruritus)  cefepime (Anaphylaxis)  codeine (Short breath)  Dilaudid (Short breath)  iodine (Short breath; Swelling)  penicillin (Anaphylaxis)  shellfish (Anaphylaxis)  tetanus toxoid (Short breath)  Valium (Short breath)    Intolerances      MEDICATIONS:  fluconAZOLE IVPB 600 milliGRAM(s) IV Intermittent every 24 hours  insulin lispro (ADMELOG) corrective regimen sliding scale   SubCutaneous every 6 hours  insulin NPH human recombinant 14 Unit(s) SubCutaneous <User Schedule>  insulin NPH human recombinant 10 Unit(s) SubCutaneous <User Schedule>  meropenem  IVPB 1000 milliGRAM(s) IV Intermittent every 8 hours  methylPREDNISolone 8 milliGRAM(s) Oral daily  vancomycin  IVPB 750 milliGRAM(s) IV Intermittent every 12 hours          PHYSICAL EXAM:  VITALS: T(C): 36.2 (10-28-22 @ 12:00)  T(F): 97.1 (10-28-22 @ 12:00), Max: 97.2 (10-28-22 @ 08:00)  HR: 72 (10-28-22 @ 17:13) (65 - 88)  BP: --  RR:  (20 - 37)  SpO2:  (96% - 100%)  Wt(kg): --  GENERAL: Female sitting in chair in NAD.   HEENT: Trach in place, NGT with TFs on at 65cc/hr  Chest: Sternal incision healed  Abdomen: Soft, nontender, non distended  Extremities: Warm, no edema in all 4 exts. RUE wound vac with minimal whitish foamy out put  NEURO: Alert and able to nod to questions    LABS:  POCT Blood Glucose.: 336 mg/dL (10-28-22 @ 13:13)  POCT Blood Glucose.: 335 mg/dL (10-28-22 @ 13:11)  POCT Blood Glucose.: 148 mg/dL (10-28-22 @ 05:06)  POCT Blood Glucose.: 162 mg/dL (10-27-22 @ 23:57)  POCT Blood Glucose.: 202 mg/dL (10-27-22 @ 17:07)  POCT Blood Glucose.: 210 mg/dL (10-27-22 @ 15:50)  POCT Blood Glucose.: 308 mg/dL (10-27-22 @ 13:24)  POCT Blood Glucose.: 344 mg/dL (10-27-22 @ 11:53)  POCT Blood Glucose.: 127 mg/dL (10-27-22 @ 05:51)  POCT Blood Glucose.: 225 mg/dL (10-27-22 @ 00:12)  POCT Blood Glucose.: 282 mg/dL (10-26-22 @ 17:08)  POCT Blood Glucose.: 210 mg/dL (10-26-22 @ 11:17)  POCT Blood Glucose.: 216 mg/dL (10-26-22 @ 06:57)  POCT Blood Glucose.: 146 mg/dL (10-25-22 @ 23:59)  POCT Blood Glucose.: 183 mg/dL (10-25-22 @ 17:42)                            9.4    16.10 )-----------( 341      ( 28 Oct 2022 00:38 )             31.8       10-28    140  |  100  |  24<H>  ----------------------------<  165<H>  4.2   |  27  |  0.49<L>    eGFR: 99    Ca    9.5      10-28  Mg     2.2     10-28  Phos  3.1     10-28    TPro  7.4  /  Alb  3.3  /  TBili  0.3  /  DBili  x   /  AST  17  /  ALT  17  /  AlkPhos  130<H>  10-28    Function Tests:  10-03 @ 04:47 TSH 0.32 FreeT4 -- T3 -- Anti TPO -- Anti Thyroglobulin Ab -- TSI --      A1C with Estimated Average Glucose Result: 9.4 % (09-06-22 @ 16:46)      Estimated Average Glucose: 223 mg/dL (09-06-22 @ 16:46)

## 2022-10-28 NOTE — PROGRESS NOTE ADULT - SUBJECTIVE AND OBJECTIVE BOX
Patient seen and examined at the bedside.    Remained critically ill on continuous ICU monitoring.    HPI:  71F PMH DM, COPD, Chronic Adrenal Insufficiency on Chronic prednisone, history of colorectal cancer s/p resection (colostomy bag), Hx of CAD, Chronic A fib on Eliquis, and tracheomalacia and multiple intubations, recent dx of OM presented to ED at [facility] this morning with epigastric pain, belching and central chest pain. Found on CTA to have type A aortic dissection and transferred to [facility] for surgical evaluation by Dr. Cabrera. (06 Sep 2022 16:32)      =================== LABS =========================                        9.4    16.10 )-----------( 341      ( 28 Oct 2022 00:38 )             31.8     10-28    140  |  100  |  24<H>  ----------------------------<  165<H>  4.2   |  27  |  0.49<L>    Ca    9.5      28 Oct 2022 00:38  Phos  3.1     10-28  Mg     2.2     10-28    TPro  7.4  /  Alb  3.3  /  TBili  0.3  /  DBili  x   /  AST  17  /  ALT  17  /  AlkPhos  130<H>  10-28    LIVER FUNCTIONS - ( 28 Oct 2022 00:38 )  Alb: 3.3 g/dL / Pro: 7.4 g/dL / ALK PHOS: 130 U/L / ALT: 17 U/L / AST: 17 U/L / GGT: x             ABG - ( 28 Oct 2022 00:25 )  pH, Arterial: 7.45  pH, Blood: x     /  pCO2: 46    /  pO2: 129   / HCO3: 32    / Base Excess: 7.2   /  SaO2: 99.1                  ==================================================    OBJECTIVE:  Vital Signs Last 24 Hrs  T(C): 36.1 (28 Oct 2022 04:00), Max: 36.2 (27 Oct 2022 12:00)  T(F): 97 (28 Oct 2022 04:00), Max: 97.1 (27 Oct 2022 12:00)  HR: 66 (28 Oct 2022 07:00) (65 - 90)  BP: --  BP(mean): --  RR: 22 (28 Oct 2022 07:00) (16 - 37)  SpO2: 100% (28 Oct 2022 07:00) (97% - 100%)    Parameters below as of 28 Oct 2022 06:37  Patient On (Oxygen Delivery Method): tracheostomy collar        REVIEW OF SYSTEMS:  [x ] N/A    Physical Exam:   General: Trach collar  Neurology: Nonfocal  ENT/Neck: + trach c/d/i, neck supple, trachea midline   Respiratory: coarse BS b/l, rhonchi throughout, minimal secretions  CV: S1S2, no murmurs        [x] Sinus Rhythm   Abdominal: Soft, NT, ND, colostomy in place (stoma intact)  Extremities: +4 RUE edema, 3+LUE edema, trace edema noted, + peripheral pulses   Skin: Dry dressing over right heel, R elbow wound w/ overlying cling dressing c/d/i                        Assessment:  71F PMH DM, COPD, chronic adrenal insufficiency on Prednisone, history of colorectal cancer s/p resection (colostomy bag), Hx of CAD, chronic AF on Eliquis, and tracheomalacia s/p multiple intubations, recent dx of R foot OM s/p debridement on antibiotics and presented to ED at [facility] this morning with epigastric pain, belching and central chest pain. Found on CTA to have type A aortic dissection and transferred to [facility] for surgical evaluation by Dr. Cabrera.     Ascending aortic dissection s/p type A dissection repair and Biobentall on 9/7   Respiratory failure s/p Trach 10/10/22  Hypovolemia   Post op respiratory failure   Acute blood loss anemia  Stress hyperglycemia   RIJ thrombus  MRSA PNA    Plan:   ***Neuro***  [x] Nonfocal  follows simple commands, continue to monitor  PT/OT progress as tolerated, ambulated with physical therapy    ***Cardiovascular***  Limited TTE on 10/1: EF 69%, Hyperdynamic left ventricular systolic function. There is hypokinesis of the mid-base inferior wall. Nml RV fxn.   CT chest on 9/28: No CT evidence of pulmonary embolism. Status post repair of ascending aortic dissection with a stable dissection flap originating from the aortic arch and extending into the infrarenal abdominal aorta,  B/l UE duplex on 10/5: As before, there is an occluded RIGHT IJ vein with thrombosis extending to brachiocephalic vein. Superficial thrombophlebitis of the LEFT cephalic vein.   Invasive hemodynamic monitoring, assess perfusion indices   SR / MAP 68/ Hct 31.8%/ Lactate 1.8  [x] Hydralazine PO for BP management   Rate control with Mexiletine and Lopressor   WVAC on rt elbow  changed 10/26  [x] AC Therapy with Eliquis 5 BID for Afib and IJ thrombus  Reassessment of hemodynamics    ***Pulmonary***  Trach collar 10L/40%  Respiratory failure s/p Trach #8 portex  10/10/22, per ENT inner cannula needs to be changed daily, trach sutures d/c'ed by ENT 10/17 , 40% trach collar for 2 days  Post op vent management   Titration of FiO2, follow SpO2, CXR, blood gases   Bronched 10/13  continue duonebs  Suction as needed   Aggressive Pulmonary toilet    Mode: standby              ***GI***  [x] Diet:  TFs Glucerna 1.2 @ goal 65ml/hr, tolerating without issues (when glucerna 1.5 back in stock will switch and decrease goal rate to 55ml/hr)  [x] Protonix    Reglan for gut motility      ***Renal***  Continue to monitor I/Os, BUN/Creatinine.   Replete lytes PRN  Diuresis with Lasix     ***ID***  SCx on 10/4+Methicillin resistant Staphylococcus aureus, c/w IVPB Vancomycin, (plan for 6 week course in setting of valve surgery, 11/26 end date)  9/27 blood culture Neela Glabarata, on flucanazole (lifelong suppression)  BCx 10/13 NGTD, BC 10/21 and SCx NGTD  R elbow wound, S/p IR for elbow drainage 10/13 + Few Proteus mirabilis ESBL, Meropenem 7 day trial completed 10/19-> reordered 10/20 for reaccumulation of elbow brusitis--> now continuing. Ortho reconsulted and plastic surgery  10/22 plastics debrided R elbow eschar to subc tissue, ~8cc fluid aspirated and sent for culture. Wet to dry dressing changed 2x daily. PT put in VAC 10/25.  Joint Fluid Cx 10/22 NTD  Meropenem for 8 day course (until 10/28),started in setting of elbow collection 10/22, f/u ID for possible shorter course given culture NTD    ***Endocrine***  [x]  DM : HbA1c 9.4%                - [x] ISS  [x] NPH              - Need tight glycemic control to prevent wound infection.  Endocrine following, appreciate recommendations            Patient requires continuous monitoring with bedside rhythm monitoring, pulse oximetry monitoring, and continuous central venous and arterial pressure monitoring; and intermittent blood gas analysis. Care plan discussed with the ICU care team.   Patient remained critical, at risk for life threatening decompensation.    By signing my name below, I, Maria D'Amico, attest that this documentation has been prepared under the direction and in the presence of Alysa Tejada NP   Electronically signed: Maria D'Amico, Scribe, 10-28-22 @ 07:47    I, Alysa Tejada, personally performed the services described in this documentation. all medical record entries made by the marcyibronaldo were at my direction and in my presence. I have reviewed the chart and agree that the record reflects my personal performance and is accurate and complete  Electronically signed: Alysa Tejada NP  Patient seen and examined at the bedside.    Remained critically ill on continuous ICU monitoring.    HPI:  71F PMH DM, COPD, Chronic Adrenal Insufficiency on Chronic prednisone, history of colorectal cancer s/p resection (colostomy bag), Hx of CAD, Chronic A fib on Eliquis, and tracheomalacia and multiple intubations, recent dx of OM presented to ED at [facility] this morning with epigastric pain, belching and central chest pain. Found on CTA to have type A aortic dissection and transferred to [facility] for surgical evaluation by Dr. Cabrera. (06 Sep 2022 16:32)      =================== LABS =========================                        9.4    16.10 )-----------( 341      ( 28 Oct 2022 00:38 )             31.8     10-28    140  |  100  |  24<H>  ----------------------------<  165<H>  4.2   |  27  |  0.49<L>    Ca    9.5      28 Oct 2022 00:38  Phos  3.1     10-28  Mg     2.2     10-28    TPro  7.4  /  Alb  3.3  /  TBili  0.3  /  DBili  x   /  AST  17  /  ALT  17  /  AlkPhos  130<H>  10-28    LIVER FUNCTIONS - ( 28 Oct 2022 00:38 )  Alb: 3.3 g/dL / Pro: 7.4 g/dL / ALK PHOS: 130 U/L / ALT: 17 U/L / AST: 17 U/L / GGT: x             ABG - ( 28 Oct 2022 00:25 )  pH, Arterial: 7.45  pH, Blood: x     /  pCO2: 46    /  pO2: 129   / HCO3: 32    / Base Excess: 7.2   /  SaO2: 99.1                  ==================================================    OBJECTIVE:  Vital Signs Last 24 Hrs  T(C): 36.1 (28 Oct 2022 04:00), Max: 36.2 (27 Oct 2022 12:00)  T(F): 97 (28 Oct 2022 04:00), Max: 97.1 (27 Oct 2022 12:00)  HR: 66 (28 Oct 2022 07:00) (65 - 90)  BP: --  BP(mean): --  RR: 22 (28 Oct 2022 07:00) (16 - 37)  SpO2: 100% (28 Oct 2022 07:00) (97% - 100%)    Parameters below as of 28 Oct 2022 06:37  Patient On (Oxygen Delivery Method): tracheostomy collar        REVIEW OF SYSTEMS:  [x ] N/A    Physical Exam:   General: Trach collar  Neurology: Nonfocal  ENT/Neck: + trach c/d/i, neck supple, trachea midline   Respiratory: coarse BS b/l, rhonchi throughout, minimal secretions  CV: S1S2, no murmurs        [x] Sinus Rhythm   Abdominal: Soft, NT, ND, colostomy in place (stoma intact)  Extremities: +4 RUE edema, 3+LUE edema, trace edema noted, + peripheral pulses   Skin: Dry dressing over right heel, R elbow wound w/ overlying cling dressing c/d/i                        Assessment:  71F PMH DM, COPD, chronic adrenal insufficiency on Prednisone, history of colorectal cancer s/p resection (colostomy bag), Hx of CAD, chronic AF on Eliquis, and tracheomalacia s/p multiple intubations, recent dx of R foot OM s/p debridement on antibiotics and presented to ED at [facility] this morning with epigastric pain, belching and central chest pain. Found on CTA to have type A aortic dissection and transferred to [facility] for surgical evaluation by Dr. Cabrera.     Ascending aortic dissection s/p type A dissection repair and Biobentall on 9/7   Respiratory failure s/p Trach 10/10/22  Hypovolemia   Post op respiratory failure   Acute blood loss anemia  Stress hyperglycemia   RIJ thrombus  MRSA PNA    Plan:   ***Neuro***  [x] Nonfocal  follows simple commands, continue to monitor  PT/OT progress as tolerated, ambulated with physical therapy    ***Cardiovascular***  Limited TTE on 10/1: EF 69%, Hyperdynamic left ventricular systolic function. There is hypokinesis of the mid-base inferior wall. Nml RV fxn.   CT chest on 9/28: No CT evidence of pulmonary embolism. Status post repair of ascending aortic dissection with a stable dissection flap originating from the aortic arch and extending into the infrarenal abdominal aorta,  B/l UE duplex on 10/5: As before, there is an occluded RIGHT IJ vein with thrombosis extending to brachiocephalic vein. Superficial thrombophlebitis of the LEFT cephalic vein.   Invasive hemodynamic monitoring, assess perfusion indices   SR / MAP 68/ Hct 31.8%/ Lactate 1.8  [x] Hydralazine PO for BP management   Rate control with Mexiletine and Lopressor   WVAC on rt elbow  changed 10/26  [x] AC Therapy with Eliquis 5 BID for Afib and IJ thrombus  Reassessment of hemodynamics    ***Pulmonary***  Trach collar 10L/40%  Respiratory failure s/p Trach #8 portex  10/10/22, per ENT inner cannula needs to be changed daily, trach sutures d/c'ed by ENT 10/17 , 40% trach collar for 2 days  Post op vent management   Titration of FiO2, follow SpO2, CXR, blood gases   Bronched 10/13  continue duonebs  Suction as needed   Aggressive Pulmonary toilet    Mode: standby              ***GI***  [x] Diet:  TFs Glucerna 1.2 @ goal 65ml/hr, tolerating without issues (when glucerna 1.5 back in stock will switch and decrease goal rate to 55ml/hr)  [x] Protonix    Reglan for gut motility      ***Renal***  Continue to monitor I/Os, BUN/Creatinine.   Replete lytes PRN  Diuresis with Lasix     ***ID***  SCx on 10/4+Methicillin resistant Staphylococcus aureus, c/w IVPB Vancomycin, (plan for 6 week course in setting of valve surgery, 11/26 end date)  9/27 blood culture Neela Glabarata, on flucanazole (lifelong suppression)  BCx 10/13 NGTD, BC 10/21 and SCx NGTD  R elbow wound, S/p IR for elbow drainage 10/13 + Few Proteus mirabilis ESBL, Meropenem 7 day trial completed 10/19-> reordered 10/20 for reaccumulation of elbow bursitis-  10/22 plastics debrided R elbow eschar to subc tissue, ~8cc fluid aspirated and sent for culture. Wet to dry dressing changed 2x daily. PT changed vac on 10/28  Joint Fluid Cx 10/22 NTD  Meropenem for 8 day course (until 10/28),started in setting of elbow collection 10/22, f/u ID for possible shorter course given culture NTD 1/288 changed vac by physical therapy woud is healing    ***Endocrine***  [x]  DM : HbA1c 9.4%                - [x] ISS  [x] NPH              - Need tight glycemic control to prevent wound infection.  Endocrine following, appreciate recommendations            Patient requires continuous monitoring with bedside rhythm monitoring, pulse oximetry monitoring, and continuous central venous and arterial pressure monitoring; and intermittent blood gas analysis. Care plan discussed with the ICU care team.   Patient remained critical, at risk for life threatening decompensation.    By signing my name below, I, Maria D'Amico, attest that this documentation has been prepared under the direction and in the presence of Alysa Tejada NP   Electronically signed: Maria D'Amico, Scribe, 10-28-22 @ 07:47    I, Alysa Tejada, personally performed the services described in this documentation. all medical record entries made by the rogers were at my direction and in my presence. I have reviewed the chart and agree that the record reflects my personal performance and is accurate and complete  Electronically signed: Alysa Tejada NP

## 2022-10-28 NOTE — PROGRESS NOTE ADULT - SUBJECTIVE AND OBJECTIVE BOX
CHIEF COMPLAINT: f/up sob, chronic resp failure, TBM, severe persistent asthma, VC dysfunction, Type A aortic dissection s/p repair w/modified "Bentall procedure and hemiarch replacement 9/6-trached--on TC-feels well, mild discomfort in right arm, weakness    Interval Events: wound care f/up, TC continues    REVIEW OF SYSTEMS:  Constitutional: No fevers or chills. No weight loss. + fatigue or generalized malaise.  Eyes: No itching or discharge from the eyes  ENT: No ear pain. No ear discharge. No nasal congestion. No post nasal drip. No epistaxis. No throat pain. No sore throat. No difficulty swallowing.   CV: No chest pain. No palpitations. No lightheadedness or dizziness.   Resp: No dyspnea at rest. No dyspnea on exertion. No orthopnea. No wheezing. No cough. No stridor. No sputum production. No chest pain with respiration.  GI: No nausea. No vomiting. No diarrhea.  MSK: No joint pain or pain in any extremities  Integumentary: No skin lesions. No pedal edema.  Neurological: + gross motor weakness. No sensory changes.  [+ ] All other systems negative  [ ] Unable to assess ROS because ________    OBJECTIVE:  ICU Vital Signs Last 24 Hrs  T(C): 36.1 (28 Oct 2022 04:00), Max: 36.2 (27 Oct 2022 12:00)  T(F): 97 (28 Oct 2022 04:00), Max: 97.1 (27 Oct 2022 12:00)  HR: 71 (28 Oct 2022 04:00) (68 - 90)  BP: --  BP(mean): --  ABP: 147/51 (28 Oct 2022 04:00) (118/37 - 157/60)  ABP(mean): 82 (28 Oct 2022 04:00) (62 - 93)  RR: 21 (28 Oct 2022 00:00) (16 - 37)  SpO2: 100% (28 Oct 2022 04:00) (97% - 100%)    O2 Parameters below as of 28 Oct 2022 04:00  Patient On (Oxygen Delivery Method): tracheostomy collar    O2 Concentration (%): 40      Mode: standby    10-26 @ 07:01  -  10-27 @ 07:00  --------------------------------------------------------  IN: 2810 mL / OUT: 2200 mL / NET: 610 mL    10-27 @ 07:01  -  10-28 @ 05:27  --------------------------------------------------------  IN: 2490 mL / OUT: 2465 mL / NET: 25 mL      CAPILLARY BLOOD GLUCOSE  162 (28 Oct 2022 00:00)      POCT Blood Glucose.: 148 mg/dL (28 Oct 2022 05:06)      PHYSICAL EXAM: NAD in chair on TC  General: Awake, alert, oriented X 3.   HEENT: Atraumatic, normocephalic.                 Mallampatti Grade 3                No nasal congestion.                No tonsillar or pharyngeal exudates.  Lymph Nodes: No palpable lymphadenopathy  Neck: No JVD. No carotid bruit.   Respiratory: abnormal chest expansion                         Normal percussion                         Normal and equal air entry                         No wheeze, rhonchi or rales.  Cardiovascular: S1 S2 normal. No murmurs, rubs or gallops.   Abdomen: Soft, non-tender, non-distended. No organomegaly. Normoactive bowel sounds.  Extremities: Warm to touch. Peripheral pulse palpable. No pedal edema.   Skin: No rashes or skin lesions  Neurological: Motor and sensory examination equal and normal in all four extremities.  Psychiatry: Appropriate mood and affect.    HOSPITAL MEDICATIONS:  MEDICATIONS  (STANDING):  acetylcysteine 10%  Inhalation 4 milliLiter(s) Inhalation every 12 hours  albuterol/ipratropium for Nebulization 3 milliLiter(s) Nebulizer every 6 hours  apixaban 5 milliGRAM(s) Oral every 12 hours  ascorbic acid 500 milliGRAM(s) Oral daily  buDESOnide    Inhalation Suspension 0.5 milliGRAM(s) Inhalation every 12 hours  chlorhexidine 0.12% Liquid 15 milliLiter(s) Oral Mucosa every 12 hours  chlorhexidine 2% Cloths 1 Application(s) Topical <User Schedule>  collagenase Ointment 1 Application(s) Topical daily  dextrose 50% Injectable 50 milliLiter(s) IV Push every 15 minutes  dorzolamide 2% Ophthalmic Solution 1 Drop(s) Left EYE three times a day  fluconAZOLE IVPB 600 milliGRAM(s) IV Intermittent every 24 hours  furosemide    Tablet 40 milliGRAM(s) Oral daily  hydrALAZINE 50 milliGRAM(s) Oral every 8 hours  insulin lispro (ADMELOG) corrective regimen sliding scale   SubCutaneous every 6 hours  insulin NPH human recombinant 14 Unit(s) SubCutaneous <User Schedule>  insulin NPH human recombinant 10 Unit(s) SubCutaneous <User Schedule>  magnesium oxide 400 milliGRAM(s) Oral every 8 hours  meropenem  IVPB 1000 milliGRAM(s) IV Intermittent every 8 hours  meropenem  IVPB      methylPREDNISolone 8 milliGRAM(s) Oral daily  metoclopramide Injectable 5 milliGRAM(s) IV Push every 8 hours  metoprolol tartrate 12.5 milliGRAM(s) Enteral Tube every 12 hours  mexiletine 200 milliGRAM(s) Oral every 8 hours  multivitamin 1 Tablet(s) Oral daily  pantoprazole  Injectable 40 milliGRAM(s) IV Push daily  sodium chloride 0.9%. 1000 milliLiter(s) (10 mL/Hr) IV Continuous <Continuous>  timolol 0.5% Solution 1 Drop(s) Left EYE two times a day  vancomycin  IVPB 750 milliGRAM(s) IV Intermittent every 12 hours  vancomycin  IVPB        MEDICATIONS  (PRN):  acetaminophen    Suspension .. 650 milliGRAM(s) Oral every 6 hours PRN Temp greater or equal to 38C (100.4F), Mild Pain (1 - 3)      LABS:                        9.4    16.10 )-----------( 341      ( 28 Oct 2022 00:38 )             31.8     10-28    140  |  100  |  24<H>  ----------------------------<  165<H>  4.2   |  27  |  0.49<L>    Ca    9.5      28 Oct 2022 00:38  Phos  3.1     10-28  Mg     2.2     10-28    TPro  7.4  /  Alb  3.3  /  TBili  0.3  /  DBili  x   /  AST  17  /  ALT  17  /  AlkPhos  130<H>  10-28        Arterial Blood Gas:  10-28 @ 00:25  7.45/46/129/32/99.1/7.2  ABG lactate: --  Arterial Blood Gas:  10-28 @ 00:00  7.43/48/137/32/98.5/6.7  ABG lactate: --  Arterial Blood Gas:  10-27 @ 00:25  7.45/44/111/31/98.6/6.0  ABG lactate: --        MICROBIOLOGY:     RADIOLOGY:  [ ] Reviewed and interpreted by me    Point of Care Ultrasound Findings:    PFT:    EKG:

## 2022-10-28 NOTE — PROGRESS NOTE ADULT - ASSESSMENT

## 2022-10-28 NOTE — PROGRESS NOTE ADULT - SUBJECTIVE AND OBJECTIVE BOX
INFECTIOUS DISEASES FOLLOW UP-- Fouzia Calixto  [phone]    This is a follow up note for this  72yFemale with  Dissection of thoracic aorta    Patient a bit more tired/somnolent today        ROS:  CONSTITUTIONAL: sleepy but opens eyes    Allergies    aspirin (Short breath)  Avelox (Short breath; Pruritus)  cefepime (Anaphylaxis)  codeine (Short breath)  Dilaudid (Short breath)  iodine (Short breath; Swelling)  penicillin (Anaphylaxis)  shellfish (Anaphylaxis)  tetanus toxoid (Short breath)  Valium (Short breath)    Intolerances        ANTIBIOTICS/RELEVANT:  antimicrobials  fluconAZOLE IVPB 600 milliGRAM(s) IV Intermittent every 24 hours  meropenem  IVPB 1000 milliGRAM(s) IV Intermittent every 8 hours  meropenem  IVPB      vancomycin  IVPB 750 milliGRAM(s) IV Intermittent every 12 hours  vancomycin  IVPB        immunologic:    OTHER:  acetaminophen    Suspension .. 650 milliGRAM(s) Oral every 6 hours PRN  acetylcysteine 10%  Inhalation 4 milliLiter(s) Inhalation every 12 hours  albuterol/ipratropium for Nebulization 3 milliLiter(s) Nebulizer every 6 hours  apixaban 5 milliGRAM(s) Oral every 12 hours  ascorbic acid 500 milliGRAM(s) Oral daily  buDESOnide    Inhalation Suspension 0.5 milliGRAM(s) Inhalation every 12 hours  chlorhexidine 0.12% Liquid 15 milliLiter(s) Oral Mucosa every 12 hours  chlorhexidine 2% Cloths 1 Application(s) Topical <User Schedule>  collagenase Ointment 1 Application(s) Topical daily  dextrose 50% Injectable 50 milliLiter(s) IV Push every 15 minutes  dorzolamide 2% Ophthalmic Solution 1 Drop(s) Left EYE three times a day  furosemide    Tablet 40 milliGRAM(s) Oral daily  hydrALAZINE 50 milliGRAM(s) Oral every 8 hours  insulin lispro (ADMELOG) corrective regimen sliding scale   SubCutaneous every 6 hours  insulin NPH human recombinant 10 Unit(s) SubCutaneous <User Schedule>  magnesium oxide 400 milliGRAM(s) Oral every 8 hours  methylPREDNISolone 8 milliGRAM(s) Oral daily  metoclopramide Injectable 5 milliGRAM(s) IV Push every 8 hours  metoprolol tartrate 12.5 milliGRAM(s) Enteral Tube every 12 hours  mexiletine 200 milliGRAM(s) Oral every 8 hours  multivitamin 1 Tablet(s) Oral daily  pantoprazole  Injectable 40 milliGRAM(s) IV Push daily  sodium chloride 0.9%. 1000 milliLiter(s) IV Continuous <Continuous>  timolol 0.5% Solution 1 Drop(s) Left EYE two times a day      Objective:  Vital Signs Last 24 Hrs  T(C): 36.3 (28 Oct 2022 16:00), Max: 36.3 (28 Oct 2022 16:00)  T(F): 97.4 (28 Oct 2022 16:00), Max: 97.4 (28 Oct 2022 16:00)  HR: 67 (28 Oct 2022 21:14) (65 - 80)  BP: --  BP(mean): --  RR: 23 (28 Oct 2022 20:00) (20 - 38)  SpO2: 100% (28 Oct 2022 21:14) (96% - 100%)    Parameters below as of 28 Oct 2022 20:00  Patient On (Oxygen Delivery Method): tracheostomy collar    O2 Concentration (%): 40    PHYSICAL EXAM:  Constitutional:no acute distress  Eyes:EBER, EOMI  Ear/Nose/Throat: no oral lesions, 	  Respiratory: clear BL  Cardiovascular: S1S2  Gastrointestinal:soft, (+) BS, no tenderness  Extremities:no e/e/c  No Lymphadenopathy  IV sites not inflammed.    LABS:                        9.4    16.10 )-----------( 341      ( 28 Oct 2022 00:38 )             31.8     10-28    140  |  100  |  24<H>  ----------------------------<  165<H>  4.2   |  27  |  0.49<L>    Ca    9.5      28 Oct 2022 00:38  Phos  3.1     10-28  Mg     2.2     10-28    TPro  7.4  /  Alb  3.3  /  TBili  0.3  /  DBili  x   /  AST  17  /  ALT  17  /  AlkPhos  130<H>  10-28          MICROBIOLOGY:            RECENT CULTURES:  10-22 @ 14:18  Joint Fl Arm - Right  Proteus mirabilis ESBL  Proteus mirabilis ESBL  GARRETT    Few Proteus mirabilis ESBL  --      RADIOLOGY & ADDITIONAL STUDIES:

## 2022-10-29 LAB
ALBUMIN SERPL ELPH-MCNC: 3.3 G/DL — SIGNIFICANT CHANGE UP (ref 3.3–5)
ALP SERPL-CCNC: 129 U/L — HIGH (ref 40–120)
ALT FLD-CCNC: 17 U/L — SIGNIFICANT CHANGE UP (ref 10–45)
ANION GAP SERPL CALC-SCNC: 9 MMOL/L — SIGNIFICANT CHANGE UP (ref 5–17)
AST SERPL-CCNC: 19 U/L — SIGNIFICANT CHANGE UP (ref 10–40)
BILIRUB SERPL-MCNC: 0.3 MG/DL — SIGNIFICANT CHANGE UP (ref 0.2–1.2)
BLD GP AB SCN SERPL QL: NEGATIVE — SIGNIFICANT CHANGE UP
BUN SERPL-MCNC: 26 MG/DL — HIGH (ref 7–23)
CALCIUM SERPL-MCNC: 9.5 MG/DL — SIGNIFICANT CHANGE UP (ref 8.4–10.5)
CHLORIDE SERPL-SCNC: 103 MMOL/L — SIGNIFICANT CHANGE UP (ref 96–108)
CO2 SERPL-SCNC: 31 MMOL/L — SIGNIFICANT CHANGE UP (ref 22–31)
CREAT SERPL-MCNC: 0.42 MG/DL — LOW (ref 0.5–1.3)
EGFR: 103 ML/MIN/1.73M2 — SIGNIFICANT CHANGE UP
GAS PNL BLDA: SIGNIFICANT CHANGE UP
GAS PNL BLDA: SIGNIFICANT CHANGE UP
GLUCOSE BLDC GLUCOMTR-MCNC: 149 MG/DL — HIGH (ref 70–99)
GLUCOSE BLDC GLUCOMTR-MCNC: 177 MG/DL — HIGH (ref 70–99)
GLUCOSE BLDC GLUCOMTR-MCNC: 304 MG/DL — HIGH (ref 70–99)
GLUCOSE BLDC GLUCOMTR-MCNC: 310 MG/DL — HIGH (ref 70–99)
GLUCOSE SERPL-MCNC: 126 MG/DL — HIGH (ref 70–99)
HCT VFR BLD CALC: 32.2 % — LOW (ref 34.5–45)
HGB BLD-MCNC: 9.6 G/DL — LOW (ref 11.5–15.5)
MAGNESIUM SERPL-MCNC: 2 MG/DL — SIGNIFICANT CHANGE UP (ref 1.6–2.6)
MCHC RBC-ENTMCNC: 23.5 PG — LOW (ref 27–34)
MCHC RBC-ENTMCNC: 29.8 GM/DL — LOW (ref 32–36)
MCV RBC AUTO: 78.9 FL — LOW (ref 80–100)
NRBC # BLD: 0 /100 WBCS — SIGNIFICANT CHANGE UP (ref 0–0)
PHOSPHATE SERPL-MCNC: 3.4 MG/DL — SIGNIFICANT CHANGE UP (ref 2.5–4.5)
PLATELET # BLD AUTO: 336 K/UL — SIGNIFICANT CHANGE UP (ref 150–400)
POTASSIUM SERPL-MCNC: 4 MMOL/L — SIGNIFICANT CHANGE UP (ref 3.5–5.3)
POTASSIUM SERPL-SCNC: 4 MMOL/L — SIGNIFICANT CHANGE UP (ref 3.5–5.3)
PROT SERPL-MCNC: 7.4 G/DL — SIGNIFICANT CHANGE UP (ref 6–8.3)
RBC # BLD: 4.08 M/UL — SIGNIFICANT CHANGE UP (ref 3.8–5.2)
RBC # FLD: 22.1 % — HIGH (ref 10.3–14.5)
RH IG SCN BLD-IMP: POSITIVE — SIGNIFICANT CHANGE UP
SODIUM SERPL-SCNC: 143 MMOL/L — SIGNIFICANT CHANGE UP (ref 135–145)
VANCOMYCIN TROUGH SERPL-MCNC: 14.8 UG/ML — SIGNIFICANT CHANGE UP (ref 10–20)
VANCOMYCIN TROUGH SERPL-MCNC: 14.9 UG/ML — SIGNIFICANT CHANGE UP (ref 10–20)
WBC # BLD: 14.52 K/UL — HIGH (ref 3.8–10.5)
WBC # FLD AUTO: 14.52 K/UL — HIGH (ref 3.8–10.5)

## 2022-10-29 PROCEDURE — 99232 SBSQ HOSP IP/OBS MODERATE 35: CPT

## 2022-10-29 PROCEDURE — 71045 X-RAY EXAM CHEST 1 VIEW: CPT | Mod: 26

## 2022-10-29 PROCEDURE — 99291 CRITICAL CARE FIRST HOUR: CPT | Mod: 24

## 2022-10-29 RX ORDER — HUMAN INSULIN 100 [IU]/ML
25 INJECTION, SUSPENSION SUBCUTANEOUS
Refills: 0 | Status: DISCONTINUED | OUTPATIENT
Start: 2022-10-30 | End: 2022-10-30

## 2022-10-29 RX ORDER — HUMAN INSULIN 100 [IU]/ML
8 INJECTION, SUSPENSION SUBCUTANEOUS
Refills: 0 | Status: DISCONTINUED | OUTPATIENT
Start: 2022-10-29 | End: 2022-11-01

## 2022-10-29 RX ORDER — HYDRALAZINE HCL 50 MG
5 TABLET ORAL ONCE
Refills: 0 | Status: COMPLETED | OUTPATIENT
Start: 2022-10-29 | End: 2022-10-29

## 2022-10-29 RX ORDER — DEXTROSE 50 % IN WATER 50 %
50 SYRINGE (ML) INTRAVENOUS
Refills: 0 | Status: COMPLETED | OUTPATIENT
Start: 2022-10-29 | End: 2022-10-28

## 2022-10-29 RX ORDER — MAGNESIUM SULFATE 500 MG/ML
1 VIAL (ML) INJECTION ONCE
Refills: 0 | Status: COMPLETED | OUTPATIENT
Start: 2022-10-29 | End: 2022-10-29

## 2022-10-29 RX ORDER — POTASSIUM CHLORIDE 20 MEQ
20 PACKET (EA) ORAL ONCE
Refills: 0 | Status: COMPLETED | OUTPATIENT
Start: 2022-10-29 | End: 2022-10-29

## 2022-10-29 RX ADMIN — HUMAN INSULIN 8 UNIT(S): 100 INJECTION, SUSPENSION SUBCUTANEOUS at 17:16

## 2022-10-29 RX ADMIN — Medication 20 MILLIEQUIVALENT(S): at 01:46

## 2022-10-29 RX ADMIN — MAGNESIUM OXIDE 400 MG ORAL TABLET 400 MILLIGRAM(S): 241.3 TABLET ORAL at 21:42

## 2022-10-29 RX ADMIN — Medication 50 MILLIGRAM(S): at 13:22

## 2022-10-29 RX ADMIN — HUMAN INSULIN 16 UNIT(S): 100 INJECTION, SUSPENSION SUBCUTANEOUS at 11:53

## 2022-10-29 RX ADMIN — APIXABAN 5 MILLIGRAM(S): 2.5 TABLET, FILM COATED ORAL at 17:14

## 2022-10-29 RX ADMIN — MEROPENEM 100 MILLIGRAM(S): 1 INJECTION INTRAVENOUS at 05:09

## 2022-10-29 RX ADMIN — DORZOLAMIDE HYDROCHLORIDE 1 DROP(S): 20 SOLUTION/ DROPS OPHTHALMIC at 13:21

## 2022-10-29 RX ADMIN — Medication 1 DROP(S): at 05:58

## 2022-10-29 RX ADMIN — MAGNESIUM OXIDE 400 MG ORAL TABLET 400 MILLIGRAM(S): 241.3 TABLET ORAL at 13:21

## 2022-10-29 RX ADMIN — MEROPENEM 100 MILLIGRAM(S): 1 INJECTION INTRAVENOUS at 23:25

## 2022-10-29 RX ADMIN — DORZOLAMIDE HYDROCHLORIDE 1 DROP(S): 20 SOLUTION/ DROPS OPHTHALMIC at 05:57

## 2022-10-29 RX ADMIN — Medication 4 MILLILITER(S): at 17:12

## 2022-10-29 RX ADMIN — Medication 1 APPLICATION(S): at 11:52

## 2022-10-29 RX ADMIN — Medication 250 MILLIGRAM(S): at 10:25

## 2022-10-29 RX ADMIN — MEROPENEM 100 MILLIGRAM(S): 1 INJECTION INTRAVENOUS at 13:19

## 2022-10-29 RX ADMIN — Medication 3 MILLILITER(S): at 06:36

## 2022-10-29 RX ADMIN — Medication 50 MILLIGRAM(S): at 06:22

## 2022-10-29 RX ADMIN — Medication 50 MILLIGRAM(S): at 21:42

## 2022-10-29 RX ADMIN — Medication 40 MILLIGRAM(S): at 05:09

## 2022-10-29 RX ADMIN — Medication 5 MILLIGRAM(S): at 21:41

## 2022-10-29 RX ADMIN — Medication 5 MILLIGRAM(S): at 01:35

## 2022-10-29 RX ADMIN — MAGNESIUM OXIDE 400 MG ORAL TABLET 400 MILLIGRAM(S): 241.3 TABLET ORAL at 05:09

## 2022-10-29 RX ADMIN — Medication 5 MILLIGRAM(S): at 13:22

## 2022-10-29 RX ADMIN — MEXILETINE HYDROCHLORIDE 200 MILLIGRAM(S): 150 CAPSULE ORAL at 21:43

## 2022-10-29 RX ADMIN — MEXILETINE HYDROCHLORIDE 200 MILLIGRAM(S): 150 CAPSULE ORAL at 13:21

## 2022-10-29 RX ADMIN — PANTOPRAZOLE SODIUM 40 MILLIGRAM(S): 20 TABLET, DELAYED RELEASE ORAL at 11:49

## 2022-10-29 RX ADMIN — Medication 0.5 MILLIGRAM(S): at 06:38

## 2022-10-29 RX ADMIN — HUMAN INSULIN 20 UNIT(S): 100 INJECTION, SUSPENSION SUBCUTANEOUS at 06:28

## 2022-10-29 RX ADMIN — Medication 650 MILLIGRAM(S): at 10:25

## 2022-10-29 RX ADMIN — CHLORHEXIDINE GLUCONATE 1 APPLICATION(S): 213 SOLUTION TOPICAL at 05:57

## 2022-10-29 RX ADMIN — Medication 0.5 MILLIGRAM(S): at 17:10

## 2022-10-29 RX ADMIN — Medication 3 MILLILITER(S): at 17:10

## 2022-10-29 RX ADMIN — DORZOLAMIDE HYDROCHLORIDE 1 DROP(S): 20 SOLUTION/ DROPS OPHTHALMIC at 21:52

## 2022-10-29 RX ADMIN — Medication 100 GRAM(S): at 03:13

## 2022-10-29 RX ADMIN — APIXABAN 5 MILLIGRAM(S): 2.5 TABLET, FILM COATED ORAL at 05:12

## 2022-10-29 RX ADMIN — Medication 8: at 11:53

## 2022-10-29 RX ADMIN — Medication 12.5 MILLIGRAM(S): at 17:14

## 2022-10-29 RX ADMIN — Medication 500 MILLIGRAM(S): at 11:50

## 2022-10-29 RX ADMIN — Medication 650 MILLIGRAM(S): at 10:55

## 2022-10-29 RX ADMIN — Medication 4 MILLILITER(S): at 06:38

## 2022-10-29 RX ADMIN — FLUCONAZOLE 150 MILLIGRAM(S): 150 TABLET ORAL at 21:08

## 2022-10-29 RX ADMIN — Medication 8 MILLIGRAM(S): at 05:08

## 2022-10-29 RX ADMIN — MEXILETINE HYDROCHLORIDE 200 MILLIGRAM(S): 150 CAPSULE ORAL at 05:09

## 2022-10-29 RX ADMIN — Medication 5 MILLIGRAM(S): at 05:09

## 2022-10-29 RX ADMIN — Medication 1 DROP(S): at 17:14

## 2022-10-29 RX ADMIN — Medication 1 TABLET(S): at 11:50

## 2022-10-29 RX ADMIN — Medication 2: at 06:26

## 2022-10-29 RX ADMIN — Medication 3 MILLILITER(S): at 11:40

## 2022-10-29 RX ADMIN — Medication 12.5 MILLIGRAM(S): at 05:09

## 2022-10-29 RX ADMIN — CHLORHEXIDINE GLUCONATE 15 MILLILITER(S): 213 SOLUTION TOPICAL at 05:09

## 2022-10-29 NOTE — PROGRESS NOTE ADULT - ASSESSMENT
71F w/h/o uncontrolled T2DM (A1C 9.4%) on basal/bolus insulin PTA. Unknown DM complications. Also COPD, secondary adrenal Insufficiency on chronic steroids, colorectal cancer s/p resection (colostomy bag), Chronic A fib on Eliquis, and tracheomalacia and multiple intubations, recent dx of OM presented to ED at [facility] with epigastric pain, belching and central chest pain. Found on CTA to have type A aortic dissection and transferred to [facility] for surgical evaluation by Dr. Cabrera. Now s/p aortic dissection repair on 9/07/22. Endocrine consulted for assistance with uncontrolled DM/steroids for AI. Pt in ICU with ventricular dysfunction/sepsis, and now s/p trach 10/10 and more recently s/p R elbow eschar debridement 10/22 > Wound VAC 10/24. Pt remains with leukocytosis and resolved transaminitis. On Prednisone dose 8mg for AI.        Type 2 diabetes mellitus with hyperglycemia.   unclear cause for hypoglycemia over night, tube feeds running, reduced 1800 nph , & increased 6am NPH for lunch BG above goal   ·  Plan:  - BG Goal 100-180mg/dl    -test BG q6h if NPO/TF   Adjusted NPH regimen to       00:00 NPH 10 units      06:00 NPH 24 units      12:00 NPH 16 units       18:00 NPH 8 units  If BG <100 can administer 50% of the dose. HOLD IF TFS OFF  -C/w Admelog moderate correction scale q6h for now    Discharge plan:  - Likely to discharge patient on basal/bolus insulin. Final regimen pending clinical course.  - Recommend routine outpatient ophthalmology, podiatry  - Can f/u with endocrinologist Dr. Saavedra.  - Make sure pt has Rx for all DM supplies and insulin/ DM meds.  -Based on pt's clinical condition and mental status at this time she is not able to independently manage her DM. Will evaluate pt's ability to manage DM at time of discharge. If unable> pt will need family/ care giver (s) to manage DM care at home  -Will need rehab.     Problem/Plan - 2:  ·  Problem: Adrenal insufficiency.   ·  Plan: On chronic steroids at home: medrol 8mg qd   C/w Prednisone 8mg qd   Will need stress steroids if acutely ill.     Problem/Plan - 3:  ·  Problem: Hyperlipidemia.   ·  Plan: Off rosuvastatin 5mg daily  Re start if not contraindicated and as per cardiology  -F/u levels as out pt.      Discussed with patient and RN   Contact via Microsoft Teams during business hours  On evenings and weekends, please call [phone] or page endocrine fellow on call.   Email: [email or web address]   Please note that this patient may be followed by different provider tomorrow.    greater than 50% of the encounter was spent counseling and/or coordination of care.  26 minutes spent on total encounter; The necessity of the time spent during the encounter on this date of service was due to development of plan of care/coordination of care/glycemic control through review of labs, blood glucose values and vital signs.        71F w/h/o uncontrolled T2DM (A1C 9.4%) on basal/bolus insulin PTA. Unknown DM complications. Also COPD, secondary adrenal Insufficiency on chronic steroids, colorectal cancer s/p resection (colostomy bag), Chronic A fib on Eliquis, and tracheomalacia and multiple intubations, recent dx of OM presented to ED [facility] with epigastric pain, belching and central chest pain. Found on CTA to have type A aortic dissection and transferred to [facility] for surgical evaluation by Dr. Cabrera. Now s/p aortic dissection repair on 9/07/22. Endocrine consulted for assistance with uncontrolled DM/steroids for AI. Pt in ICU with ventricular dysfunction/sepsis, and now s/p trach 10/10 and more recently s/p R elbow eschar debridement 10/22 > Wound VAC 10/24. Pt remains with leukocytosis and resolved transaminitis. On Prednisone dose 8mg for AI.        Type 2 diabetes mellitus with hyperglycemia.   unclear cause for hypoglycemia over night, tube feeds running, reduced 1800 nph , & increased 6am NPH for lunch BG above goal   ·  Plan:  - BG Goal 100-180mg/dl    -test BG q6h if NPO/TF   Adjusted NPH regimen to       00:00 NPH 10 units      06:00 NPH 25 units      12:00 NPH 16 units       18:00 NPH 8 units  If BG <100 can administer 50% of the dose. HOLD IF TFS OFF  -C/w Admelog moderate correction scale q6h for now    Discharge plan:  - Likely to discharge patient on basal/bolus insulin. Final regimen pending clinical course.  - Recommend routine outpatient ophthalmology, podiatry  - Can f/u with endocrinologist Dr. Saavedra.  - Make sure pt has Rx for all DM supplies and insulin/ DM meds.  -Based on pt's clinical condition and mental status at this time she is not able to independently manage her DM. Will evaluate pt's ability to manage DM at time of discharge. If unable> pt will need family/ care giver (s) to manage DM care at home  -Will need rehab.     Problem/Plan - 2:  ·  Problem: Adrenal insufficiency.   ·  Plan: On chronic steroids at home: medrol 8mg qd   C/w Prednisone 8mg qd   Will need stress steroids if acutely ill.     Problem/Plan - 3:  ·  Problem: Hyperlipidemia.   ·  Plan: Off rosuvastatin 5mg daily  Re start if not contraindicated and as per cardiology  -F/u levels as out pt.      Discussed with patient and RN   Contact via Microsoft Teams during business hours  On evenings and weekends, please call [phone] or page endocrine fellow on call.   Email: [email or web address]   Please note that this patient may be followed by different provider tomorrow.    greater than 50% of the encounter was spent counseling and/or coordination of care.  26 minutes spent on total encounter; The necessity of the time spent during the encounter on this date of service was due to development of plan of care/coordination of care/glycemic control through review of labs, blood glucose values and vital signs.

## 2022-10-29 NOTE — PROGRESS NOTE ADULT - SUBJECTIVE AND OBJECTIVE BOX
seen earlier today     Chief Complaint: Type 2 Diabetes Mellitus     INTERVAL HX: tolerating tube feedings at goal, d/w RN Tube feeds continued, no report of holding tube feedings, appears tube feeding continued per I&O sheet as well.  pt reports she felt dizzy/fatigue when BG 56 which resolved after hypoglycemia treatment with D50%.       Review of Systems:  General: As above  Cardiovascular: No chest pain  Respiratory: No SOB  GI: No nausea, vomiting  Endocrine: dizzy/fatigue when BG 56     Allergies    aspirin (Short breath)  Avelox (Short breath; Pruritus)  cefepime (Anaphylaxis)  codeine (Short breath)  Dilaudid (Short breath)  iodine (Short breath; Swelling)  penicillin (Anaphylaxis)  shellfish (Anaphylaxis)  tetanus toxoid (Short breath)  Valium (Short breath)    Intolerances      MEDICATIONS  (STANDING):  acetylcysteine 10%  Inhalation 4 milliLiter(s) Inhalation every 12 hours  albuterol/ipratropium for Nebulization 3 milliLiter(s) Nebulizer every 6 hours  apixaban 5 milliGRAM(s) Oral every 12 hours  ascorbic acid 500 milliGRAM(s) Oral daily  buDESOnide    Inhalation Suspension 0.5 milliGRAM(s) Inhalation every 12 hours  chlorhexidine 2% Cloths 1 Application(s) Topical <User Schedule>  collagenase Ointment 1 Application(s) Topical daily  dextrose 50% Injectable 50 milliLiter(s) IV Push every 15 minutes  dorzolamide 2% Ophthalmic Solution 1 Drop(s) Left EYE three times a day  fluconAZOLE IVPB 600 milliGRAM(s) IV Intermittent every 24 hours  furosemide    Tablet 40 milliGRAM(s) Oral daily  hydrALAZINE 50 milliGRAM(s) Oral every 8 hours  insulin lispro (ADMELOG) corrective regimen sliding scale   SubCutaneous every 6 hours  insulin NPH human recombinant 10 Unit(s) SubCutaneous <User Schedule>  insulin NPH human recombinant 16 Unit(s) SubCutaneous <User Schedule>  insulin NPH human recombinant 8 Unit(s) SubCutaneous <User Schedule>  magnesium oxide 400 milliGRAM(s) Oral every 8 hours  meropenem  IVPB      meropenem  IVPB 1000 milliGRAM(s) IV Intermittent every 8 hours  methylPREDNISolone 8 milliGRAM(s) Oral daily  metoclopramide Injectable 5 milliGRAM(s) IV Push every 8 hours  metoprolol tartrate 12.5 milliGRAM(s) Enteral Tube every 12 hours  mexiletine 200 milliGRAM(s) Oral every 8 hours  multivitamin 1 Tablet(s) Oral daily  pantoprazole  Injectable 40 milliGRAM(s) IV Push daily  sodium chloride 0.9%. 1000 milliLiter(s) (10 mL/Hr) IV Continuous <Continuous>  timolol 0.5% Solution 1 Drop(s) Left EYE two times a day  vancomycin  IVPB 750 milliGRAM(s) IV Intermittent every 12 hours  vancomycin  IVPB            dextrose 50% Injectable   50 milliLiter(s) IV Push (10-28-22 @ 23:50)    insulin lispro (ADMELOG) corrective regimen sliding scale   8 Unit(s) SubCutaneous (10-29-22 @ 11:53)   2 Unit(s) SubCutaneous (10-29-22 @ 06:26)   4 Unit(s) SubCutaneous (10-28-22 @ 18:13)   8 Unit(s) SubCutaneous (10-28-22 @ 13:24)    insulin NPH human recombinant   14 Unit(s) SubCutaneous (10-28-22 @ 13:23)    insulin NPH human recombinant   20 Unit(s) SubCutaneous (10-29-22 @ 06:28)    insulin NPH human recombinant   16 Unit(s) SubCutaneous (10-29-22 @ 11:53)    insulin NPH human recombinant   10 Unit(s) SubCutaneous (10-28-22 @ 18:12)    methylPREDNISolone   8 milliGRAM(s) Oral (10-29-22 @ 05:08)        PHYSICAL EXAM:  VITALS: T(C): 36.9 (10-29-22 @ 12:00)  T(F): 98.4 (10-29-22 @ 12:00), Max: 98.4 (10-29-22 @ 12:00)  HR: 88 (10-29-22 @ 12:00) (59 - 88)  BP: 138/62 (10-29-22 @ 04:00) (130/60 - 161/67)  RR:  (20 - 50)  SpO2:  (93% - 100%)  Wt(kg): --  GENERAL: female sitting in chair in NAD, TF via NGT, wound vac  Respiratory: Respirations unlabored , trach collar  Extremities: Warm, no edema  NEURO: Alert , appropriate       LABS:    POCT Blood Glucose.: 310 mg/dL (10-29-22 @ 11:52)  POCT Blood Glucose.: 304 mg/dL (10-29-22 @ 11:51)  POCT Blood Glucose.: 177 mg/dL (10-29-22 @ 05:55)  POCT Blood Glucose.: 56 mg/dL (10-28-22 @ 23:44)  POCT Blood Glucose.: 56 mg/dL (10-28-22 @ 23:42)  POCT Blood Glucose.: 205 mg/dL (10-28-22 @ 17:57)  POCT Blood Glucose.: 336 mg/dL (10-28-22 @ 13:13)  POCT Blood Glucose.: 335 mg/dL (10-28-22 @ 13:11)  POCT Blood Glucose.: 148 mg/dL (10-28-22 @ 05:06)  POCT Blood Glucose.: 162 mg/dL (10-27-22 @ 23:57)  POCT Blood Glucose.: 202 mg/dL (10-27-22 @ 17:07)  POCT Blood Glucose.: 210 mg/dL (10-27-22 @ 15:50)  POCT Blood Glucose.: 308 mg/dL (10-27-22 @ 13:24)  POCT Blood Glucose.: 344 mg/dL (10-27-22 @ 11:53)  POCT Blood Glucose.: 127 mg/dL (10-27-22 @ 05:51)  POCT Blood Glucose.: 225 mg/dL (10-27-22 @ 00:12)  POCT Blood Glucose.: 282 mg/dL (10-26-22 @ 17:08)                            9.6    14.52 )-----------( 336      ( 29 Oct 2022 00:50 )             32.2       10-29    143  |  103  |  26<H>  ----------------------------<  126<H>  4.0   |  31  |  0.42<L>    Ca    9.5      29 Oct 2022 00:49  Phos  3.4     10-29  Mg     2.0     10-29    TPro  7.4  /  Alb  3.3  /  TBili  0.3  /  DBili  x   /  AST  19  /  ALT  17  /  AlkPhos  129<H>  10-29      eGFR: 103 mL/min/1.73m2 (29 Oct 2022 00:49)          Thyroid Function Tests:  10-03 @ 04:47 TSH 0.32 FreeT4 -- T3 -- Anti TPO -- Anti Thyroglobulin Ab -- TSI --          A1C with Estimated Average Glucose Result: 9.4 % (09-06-22 @ 16:46)      Estimated Average Glucose: 223 mg/dL (09-06-22 @ 16:46)

## 2022-10-29 NOTE — PROGRESS NOTE ADULT - SUBJECTIVE AND OBJECTIVE BOX
Patient seen and examined at the bedside.    Remained critically ill on continuous ICU monitoring.    HPI:  71F PMH DM, COPD, Chronic Adrenal Insufficiency on Chronic prednisone, history of colorectal cancer s/p resection (colostomy bag), Hx of CAD, Chronic A fib on Eliquis, and tracheomalacia and multiple intubations, recent dx of OM presented to ED at [facility] this morning with epigastric pain, belching and central chest pain. Found on CTA to have type A aortic dissection and transferred to [facility] for surgical evaluation by Dr. Cabrera. (06 Sep 2022 16:32)      =================== LABS =========================                        9.6    14.52 )-----------( 336      ( 29 Oct 2022 00:50 )             32.2     10-29    143  |  103  |  26<H>  ----------------------------<  126<H>  4.0   |  31  |  0.42<L>    Ca    9.5      29 Oct 2022 00:49  Phos  3.4     10-29  Mg     2.0     10-29    TPro  7.4  /  Alb  3.3  /  TBili  0.3  /  DBili  x   /  AST  19  /  ALT  17  /  AlkPhos  129<H>  10-29    LIVER FUNCTIONS - ( 29 Oct 2022 00:49 )  Alb: 3.3 g/dL / Pro: 7.4 g/dL / ALK PHOS: 129 U/L / ALT: 17 U/L / AST: 19 U/L / GGT: x             ABG - ( 29 Oct 2022 00:44 )  pH, Arterial: 7.43  pH, Blood: x     /  pCO2: 52    /  pO2: 118   / HCO3: 34    / Base Excess: 8.9   /  SaO2: 99.7                  ==================================================    OBJECTIVE:  Vital Signs Last 24 Hrs  T(C): 36.1 (29 Oct 2022 04:00), Max: 36.4 (28 Oct 2022 20:00)  T(F): 97 (29 Oct 2022 04:00), Max: 97.5 (28 Oct 2022 20:00)  HR: 75 (29 Oct 2022 07:00) (59 - 80)  BP: 138/62 (29 Oct 2022 04:00) (130/60 - 161/67)  BP(mean): 89 (29 Oct 2022 04:00) (86 - 97)  RR: 50 (29 Oct 2022 07:00) (20 - 50)  SpO2: 98% (29 Oct 2022 07:00) (93% - 100%)    Parameters below as of 29 Oct 2022 06:39  Patient On (Oxygen Delivery Method): tracheostomy collar        REVIEW OF SYSTEMS:  [x ] N/A    Physical Exam:   General: Trach collar  Neurology: Nonfocal  ENT/Neck: + trach c/d/i, neck supple, trachea midline   Respiratory: coarse BS b/l, rhonchi throughout, minimal secretions  CV: S1S2, no murmurs        [x] Sinus Rhythm   Abdominal: Soft, NT, ND, colostomy in place (stoma intact)  Extremities: +4 RUE edema, 3+LUE edema, trace edema noted, + peripheral pulses   Skin: Dry dressing over right heel, R elbow wound w/ overlying cling dressing c/d/i                        Assessment:  71F PMH DM, COPD, chronic adrenal insufficiency on Prednisone, history of colorectal cancer s/p resection (colostomy bag), Hx of CAD, chronic AF on Eliquis, and tracheomalacia s/p multiple intubations, recent dx of R foot OM s/p debridement on antibiotics and presented to ED at [facility] this morning with epigastric pain, belching and central chest pain. Found on CTA to have type A aortic dissection and transferred to [facility] for surgical evaluation by Dr. Cabrera.     Ascending aortic dissection s/p type A dissection repair and Biobentall on 9/7   Respiratory failure s/p Trach 10/10/22  Hypovolemia   Post op respiratory failure   Acute blood loss anemia  Stress hyperglycemia   RIJ thrombus  MRSA PNA      Plan:   ***Neuro***  [x] Nonfocal  follows simple commands, continue to monitor  PT/OT progress as tolerated, ambulated with physical therapy    ***Cardiovascular***  Limited TTE on 10/1: EF 69%, Hyperdynamic left ventricular systolic function. There is hypokinesis of the mid-base inferior wall. Nml RV fxn.   CT chest on 9/28: No CT evidence of pulmonary embolism. Status post repair of ascending aortic dissection with a stable dissection flap originating from the aortic arch and extending into the infrarenal abdominal aorta,  B/l UE duplex on 10/5: As before, there is an occluded RIGHT IJ vein with thrombosis extending to brachiocephalic vein. Superficial thrombophlebitis of the LEFT cephalic vein.   Invasive hemodynamic monitoring, assess perfusion indices   SR / MAP 77/ Hct 32.2%/ Lactate 1.1  [x] Hydralazine PO for BP management   Rate control with Mexiletine and Lopressor   WVAC on rt elbow  changed 10/26  [x] AC Therapy with Eliquis 5 BID for Afib and IJ thrombus  Reassessment of hemodynamics    ***Pulmonary***  Trach collar 10L/40%  Respiratory failure s/p Trach #8 portex  10/10/22, per ENT inner cannula needs to be changed daily, trach sutures d/c'ed by ENT 10/17 , 40% trach collar for 2 days  Post op vent management   Titration of FiO2, follow SpO2, CXR, blood gases   Bronched 10/13  Continue duonebs  Suction as needed   Aggressive Pulmonary toilet    Mode: standby              ***GI***  [x] Diet:  TFs Glucerna 1.2 @ goal 65ml/hr, tolerating without issues (when glucerna 1.5 back in stock will switch and decrease goal rate to 55ml/hr)  [x] Protonix    Reglan for gut motility      ***Renal***    Continue to monitor I/Os, BUN/Creatinine.   Replete lytes PRN  Diuresis with Lasix     ***ID***  SCx on 10/4+Methicillin resistant Staphylococcus aureus, c/w IVPB Vancomycin, (plan for 6 week course in setting of valve surgery, 11/26 end date)  9/27 blood culture Neela Glabarata, on flucanazole (lifelong suppression)  BCx 10/13 NGTD, BC 10/21 and SCx NGTD  R elbow wound, S/p IR for elbow drainage 10/13 + Few Proteus mirabilis ESBL, Meropenem 7 day trial completed 10/19-> reordered 10/20 for reaccumulation of elbow bursitis-  10/22 plastics debrided R elbow eschar to subc tissue, ~8cc fluid aspirated and sent for culture. Wet to dry dressing changed 2x daily. PT changed vac on 10/28  Joint Fluid Cx 10/22 NTD  Meropenem for 8 day course (until 10/28),started in setting of elbow collection 10/22, f/u ID for possible shorter course given culture NTD 1/288 changed vac by physical therapy woud is healing    ***Endocrine***  [x]  DM : HbA1c 9.4%                - [x] ISS  [x] NPH              - Need tight glycemic control to prevent wound infection.  Endocrine following, appreciate recommendations            Patient requires continuous monitoring with bedside rhythm monitoring, pulse oximetry monitoring, and continuous central venous and arterial pressure monitoring; and intermittent blood gas analysis. Care plan discussed with the ICU care team.   Patient remained critical, at risk for life threatening decompensation.    By signing my name below, I, Maria D'Amico, attest that this documentation has been prepared under the direction and in the presence of Alysa Tejada NP   Electronically signed: Maria D'Amico, Scribe, 10-29-22 @ 07:36    I, Alysa Tejada, personally performed the services described in this documentation. all medical record entries made by the rogers were at my direction and in my presence. I have reviewed the chart and agree that the record reflects my personal performance and is accurate and complete  Electronically signed: Alysa Tejada NP  Patient seen and examined at the bedside.    Remained critically ill on continuous ICU monitoring.    HPI:  71F PMH DM, COPD, Chronic Adrenal Insufficiency on Chronic prednisone, history of colorectal cancer s/p resection (colostomy bag), Hx of CAD, Chronic A fib on Eliquis, and tracheomalacia and multiple intubations, recent dx of OM presented to ED at [facility] this morning with epigastric pain, belching and central chest pain. Found on CTA to have type A aortic dissection and transferred to [facility] for surgical evaluation by Dr. Cabrera. (06 Sep 2022 16:32)      =================== LABS =========================                        9.6    14.52 )-----------( 336      ( 29 Oct 2022 00:50 )             32.2     10-29    143  |  103  |  26<H>  ----------------------------<  126<H>  4.0   |  31  |  0.42<L>    Ca    9.5      29 Oct 2022 00:49  Phos  3.4     10-29  Mg     2.0     10-29    TPro  7.4  /  Alb  3.3  /  TBili  0.3  /  DBili  x   /  AST  19  /  ALT  17  /  AlkPhos  129<H>  10-29    LIVER FUNCTIONS - ( 29 Oct 2022 00:49 )  Alb: 3.3 g/dL / Pro: 7.4 g/dL / ALK PHOS: 129 U/L / ALT: 17 U/L / AST: 19 U/L / GGT: x             ABG - ( 29 Oct 2022 00:44 )  pH, Arterial: 7.43  pH, Blood: x     /  pCO2: 52    /  pO2: 118   / HCO3: 34    / Base Excess: 8.9   /  SaO2: 99.7                  ==================================================    OBJECTIVE:  Vital Signs Last 24 Hrs  T(C): 36.1 (29 Oct 2022 04:00), Max: 36.4 (28 Oct 2022 20:00)  T(F): 97 (29 Oct 2022 04:00), Max: 97.5 (28 Oct 2022 20:00)  HR: 75 (29 Oct 2022 07:00) (59 - 80)  BP: 138/62 (29 Oct 2022 04:00) (130/60 - 161/67)  BP(mean): 89 (29 Oct 2022 04:00) (86 - 97)  RR: 50 (29 Oct 2022 07:00) (20 - 50)  SpO2: 98% (29 Oct 2022 07:00) (93% - 100%)    Parameters below as of 29 Oct 2022 06:39  Patient On (Oxygen Delivery Method): tracheostomy collar        REVIEW OF SYSTEMS:  [x ] N/A    Physical Exam:   General: Trach collar  Neurology: Nonfocal  ENT/Neck: + trach c/d/i, neck supple, trachea midline   Respiratory: coarse BS b/l, rhonchi throughout, minimal secretions  CV: S1S2, no murmurs        [x] Sinus Rhythm   Abdominal: Soft, NT, ND, colostomy in place (stoma intact)  Extremities: +4 RUE edema, 3+LUE edema, trace edema noted, + peripheral pulses   Skin: Dry dressing over right heel, R elbow wound w/ overlying cling dressing c/d/i                        Assessment:  71F PMH DM, COPD, chronic adrenal insufficiency on Prednisone, history of colorectal cancer s/p resection (colostomy bag), Hx of CAD, chronic AF on Eliquis, and tracheomalacia s/p multiple intubations, recent dx of R foot OM s/p debridement on antibiotics and presented to ED at [facility] this morning with epigastric pain, belching and central chest pain. Found on CTA to have type A aortic dissection and transferred to [facility] for surgical evaluation by Dr. Cabrera.     Ascending aortic dissection s/p type A dissection repair and Biobentall on 9/7   Respiratory failure s/p Trach 10/10/22  Hypovolemia   Post op respiratory failure   Acute blood loss anemia  Stress hyperglycemia   RIJ thrombus  MRSA PNA      Plan:   ***Neuro***  [x] Nonfocal  follows simple commands, continue to monitor  PT/OT progress as tolerated, ambulated with physical therapy    ***Cardiovascular***  Limited TTE on 10/1: EF 69%, Hyperdynamic left ventricular systolic function. There is hypokinesis of the mid-base inferior wall. Nml RV fxn.   CT chest on 9/28: No CT evidence of pulmonary embolism. Status post repair of ascending aortic dissection with a stable dissection flap originating from the aortic arch and extending into the infrarenal abdominal aorta,  B/l UE duplex on 10/5: As before, there is an occluded RIGHT IJ vein with thrombosis extending to brachiocephalic vein. Superficial thrombophlebitis of the LEFT cephalic vein.   Invasive hemodynamic monitoring, assess perfusion indices   SR / MAP 77/ Hct 32.2%/ Lactate 1.1  [x] Hydralazine PO for BP management   Rate control with Mexiletine and Lopressor   WVAC on rt elbow  changed 10/26  [x] AC Therapy with Eliquis 5 BID for Afib and IJ thrombus  Reassessment of hemodynamics    ***Pulmonary***  Trach collar 10L/40% for 4 days,   Respiratory failure s/p Trach #8 portex  10/10/22, per ENT inner cannula needs to be changed daily, trach sutures d/c'ed by ENT 10/17 , 40% trach collar for 2 days  Post op vent management   Titration of FiO2, follow SpO2, CXR, blood gases   Bronched 10/13  Continue duonebs  Suction q2hrly as needed   Aggressive Pulmonary toilet                ***GI***  [x] Diet:  TFs Glucerna 1.2 @ goal 65ml/hr, tolerating without issues (when glucerna 1.5 back in stock will switch and decrease goal rate to 55ml/hr)  [x] Protonix    Reglan for gut motility      ***Renal***    Continue to monitor I/Os, BUN/Creatinine.   Replete lytes PRN  Diuresis with Lasix     ***ID***  SCx on 10/4+Methicillin resistant Staphylococcus aureus, c/w IVPB Vancomycin, (plan for 6 week course in setting of valve surgery, 11/26 end date)  9/27 blood culture Neela Glabarata, on flucanazole (lifelong suppression)  BCx 10/13 NGTD, BC 10/21 and SCx NGTD  R elbow wound, S/p IR for elbow drainage 10/13 + Few Proteus mirabilis ESBL, Meropenem 7 day trial completed 10/19-> reordered 10/20 for reaccumulation of elbow bursitis-  10/22 plastics debrided R elbow eschar to subc tissue, ~8cc fluid aspirated and sent for culture. Wet to dry dressing changed 2x daily. PT changed vac on 10/28  Joint Fluid Cx 10/22 NTD  Meropenem for 8 day course (until 10/28),started in setting of elbow collection 10/22, f/u ID for possible shorter course given culture NTD 1/288 changed vac by physical therapy woud is healing    ***Endocrine***  [x]  DM : HbA1c 9.4%                - [x] ISS  [x] NPH              - Need tight glycemic control to prevent wound infection.  Endocrine following, appreciate recommendations            Patient requires continuous monitoring with bedside rhythm monitoring, pulse oximetry monitoring, and continuous central venous and arterial pressure monitoring; and intermittent blood gas analysis. Care plan discussed with the ICU care team.   Patient remained critical, at risk for life threatening decompensation.    By signing my name below, I, Maria D'Amico, attest that this documentation has been prepared under the direction and in the presence of Alysa Tejada NP   Electronically signed: Maria D'Amico, Scribe, 10-29-22 @ 07:36    I, Alysa Tejada, personally performed the services described in this documentation. all medical record entries made by the rogers were at my direction and in my presence. I have reviewed the chart and agree that the record reflects my personal performance and is accurate and complete  Electronically signed: Alysa Tejada NP

## 2022-10-30 LAB
ALBUMIN SERPL ELPH-MCNC: 3.4 G/DL — SIGNIFICANT CHANGE UP (ref 3.3–5)
ALP SERPL-CCNC: 131 U/L — HIGH (ref 40–120)
ALT FLD-CCNC: 20 U/L — SIGNIFICANT CHANGE UP (ref 10–45)
ANION GAP SERPL CALC-SCNC: 9 MMOL/L — SIGNIFICANT CHANGE UP (ref 5–17)
AST SERPL-CCNC: 17 U/L — SIGNIFICANT CHANGE UP (ref 10–40)
BILIRUB SERPL-MCNC: 0.2 MG/DL — SIGNIFICANT CHANGE UP (ref 0.2–1.2)
BUN SERPL-MCNC: 24 MG/DL — HIGH (ref 7–23)
CALCIUM SERPL-MCNC: 9.7 MG/DL — SIGNIFICANT CHANGE UP (ref 8.4–10.5)
CHLORIDE SERPL-SCNC: 101 MMOL/L — SIGNIFICANT CHANGE UP (ref 96–108)
CO2 SERPL-SCNC: 31 MMOL/L — SIGNIFICANT CHANGE UP (ref 22–31)
CREAT SERPL-MCNC: 0.43 MG/DL — LOW (ref 0.5–1.3)
EGFR: 102 ML/MIN/1.73M2 — SIGNIFICANT CHANGE UP
GAS PNL BLDA: SIGNIFICANT CHANGE UP
GLUCOSE BLDC GLUCOMTR-MCNC: 131 MG/DL — HIGH (ref 70–99)
GLUCOSE BLDC GLUCOMTR-MCNC: 145 MG/DL — HIGH (ref 70–99)
GLUCOSE BLDC GLUCOMTR-MCNC: 149 MG/DL — HIGH (ref 70–99)
GLUCOSE BLDC GLUCOMTR-MCNC: 288 MG/DL — HIGH (ref 70–99)
GLUCOSE BLDC GLUCOMTR-MCNC: 300 MG/DL — HIGH (ref 70–99)
GLUCOSE SERPL-MCNC: 152 MG/DL — HIGH (ref 70–99)
HCT VFR BLD CALC: 34.2 % — LOW (ref 34.5–45)
HGB BLD-MCNC: 9.9 G/DL — LOW (ref 11.5–15.5)
MAGNESIUM SERPL-MCNC: 2 MG/DL — SIGNIFICANT CHANGE UP (ref 1.6–2.6)
MCHC RBC-ENTMCNC: 23.1 PG — LOW (ref 27–34)
MCHC RBC-ENTMCNC: 28.9 GM/DL — LOW (ref 32–36)
MCV RBC AUTO: 79.7 FL — LOW (ref 80–100)
NRBC # BLD: 0 /100 WBCS — SIGNIFICANT CHANGE UP (ref 0–0)
PHOSPHATE SERPL-MCNC: 3.1 MG/DL — SIGNIFICANT CHANGE UP (ref 2.5–4.5)
PLATELET # BLD AUTO: 342 K/UL — SIGNIFICANT CHANGE UP (ref 150–400)
POTASSIUM SERPL-MCNC: 4.5 MMOL/L — SIGNIFICANT CHANGE UP (ref 3.5–5.3)
POTASSIUM SERPL-SCNC: 4.5 MMOL/L — SIGNIFICANT CHANGE UP (ref 3.5–5.3)
PROT SERPL-MCNC: 7.2 G/DL — SIGNIFICANT CHANGE UP (ref 6–8.3)
RBC # BLD: 4.29 M/UL — SIGNIFICANT CHANGE UP (ref 3.8–5.2)
RBC # FLD: 21.9 % — HIGH (ref 10.3–14.5)
SODIUM SERPL-SCNC: 141 MMOL/L — SIGNIFICANT CHANGE UP (ref 135–145)
VANCOMYCIN TROUGH SERPL-MCNC: 13.9 UG/ML — SIGNIFICANT CHANGE UP (ref 10–20)
WBC # BLD: 13.92 K/UL — HIGH (ref 3.8–10.5)
WBC # FLD AUTO: 13.92 K/UL — HIGH (ref 3.8–10.5)

## 2022-10-30 PROCEDURE — 99233 SBSQ HOSP IP/OBS HIGH 50: CPT

## 2022-10-30 PROCEDURE — 71045 X-RAY EXAM CHEST 1 VIEW: CPT | Mod: 26

## 2022-10-30 RX ORDER — HUMAN INSULIN 100 [IU]/ML
30 INJECTION, SUSPENSION SUBCUTANEOUS
Refills: 0 | Status: DISCONTINUED | OUTPATIENT
Start: 2022-10-31 | End: 2022-10-31

## 2022-10-30 RX ADMIN — Medication 5 MILLIGRAM(S): at 13:25

## 2022-10-30 RX ADMIN — Medication 0: at 18:20

## 2022-10-30 RX ADMIN — Medication 500 MILLIGRAM(S): at 11:56

## 2022-10-30 RX ADMIN — Medication 40 MILLIGRAM(S): at 05:10

## 2022-10-30 RX ADMIN — Medication 12.5 MILLIGRAM(S): at 05:07

## 2022-10-30 RX ADMIN — HUMAN INSULIN 8 UNIT(S): 100 INJECTION, SUSPENSION SUBCUTANEOUS at 18:21

## 2022-10-30 RX ADMIN — DORZOLAMIDE HYDROCHLORIDE 1 DROP(S): 20 SOLUTION/ DROPS OPHTHALMIC at 22:01

## 2022-10-30 RX ADMIN — Medication 12.5 MILLIGRAM(S): at 18:16

## 2022-10-30 RX ADMIN — HUMAN INSULIN 16 UNIT(S): 100 INJECTION, SUSPENSION SUBCUTANEOUS at 11:57

## 2022-10-30 RX ADMIN — DORZOLAMIDE HYDROCHLORIDE 1 DROP(S): 20 SOLUTION/ DROPS OPHTHALMIC at 05:12

## 2022-10-30 RX ADMIN — MEROPENEM 100 MILLIGRAM(S): 1 INJECTION INTRAVENOUS at 13:25

## 2022-10-30 RX ADMIN — MAGNESIUM OXIDE 400 MG ORAL TABLET 400 MILLIGRAM(S): 241.3 TABLET ORAL at 22:00

## 2022-10-30 RX ADMIN — HUMAN INSULIN 25 UNIT(S): 100 INJECTION, SUSPENSION SUBCUTANEOUS at 05:55

## 2022-10-30 RX ADMIN — Medication 1 DROP(S): at 05:11

## 2022-10-30 RX ADMIN — Medication 50 MILLIGRAM(S): at 22:00

## 2022-10-30 RX ADMIN — Medication 5 MILLIGRAM(S): at 21:59

## 2022-10-30 RX ADMIN — Medication 3 MILLILITER(S): at 01:00

## 2022-10-30 RX ADMIN — Medication 4 MILLILITER(S): at 05:23

## 2022-10-30 RX ADMIN — Medication 1 APPLICATION(S): at 11:58

## 2022-10-30 RX ADMIN — Medication 50 MILLIGRAM(S): at 05:20

## 2022-10-30 RX ADMIN — Medication 250 MILLIGRAM(S): at 00:04

## 2022-10-30 RX ADMIN — Medication 3 MILLILITER(S): at 05:23

## 2022-10-30 RX ADMIN — FLUCONAZOLE 150 MILLIGRAM(S): 150 TABLET ORAL at 21:59

## 2022-10-30 RX ADMIN — CHLORHEXIDINE GLUCONATE 1 APPLICATION(S): 213 SOLUTION TOPICAL at 05:35

## 2022-10-30 RX ADMIN — Medication 8 MILLIGRAM(S): at 05:09

## 2022-10-30 RX ADMIN — MEXILETINE HYDROCHLORIDE 200 MILLIGRAM(S): 150 CAPSULE ORAL at 21:59

## 2022-10-30 RX ADMIN — Medication 6: at 11:55

## 2022-10-30 RX ADMIN — MEROPENEM 100 MILLIGRAM(S): 1 INJECTION INTRAVENOUS at 05:06

## 2022-10-30 RX ADMIN — MEXILETINE HYDROCHLORIDE 200 MILLIGRAM(S): 150 CAPSULE ORAL at 13:23

## 2022-10-30 RX ADMIN — HUMAN INSULIN 10 UNIT(S): 100 INJECTION, SUSPENSION SUBCUTANEOUS at 00:58

## 2022-10-30 RX ADMIN — Medication 0.5 MILLIGRAM(S): at 18:31

## 2022-10-30 RX ADMIN — APIXABAN 5 MILLIGRAM(S): 2.5 TABLET, FILM COATED ORAL at 18:16

## 2022-10-30 RX ADMIN — Medication 250 MILLIGRAM(S): at 11:54

## 2022-10-30 RX ADMIN — Medication 1 TABLET(S): at 11:56

## 2022-10-30 RX ADMIN — MAGNESIUM OXIDE 400 MG ORAL TABLET 400 MILLIGRAM(S): 241.3 TABLET ORAL at 05:07

## 2022-10-30 RX ADMIN — MEXILETINE HYDROCHLORIDE 200 MILLIGRAM(S): 150 CAPSULE ORAL at 05:08

## 2022-10-30 RX ADMIN — Medication 50 MILLIGRAM(S): at 13:24

## 2022-10-30 RX ADMIN — Medication 0.5 MILLIGRAM(S): at 05:23

## 2022-10-30 RX ADMIN — Medication 3 MILLILITER(S): at 18:16

## 2022-10-30 RX ADMIN — Medication 5 MILLIGRAM(S): at 05:20

## 2022-10-30 RX ADMIN — Medication 4 MILLILITER(S): at 18:16

## 2022-10-30 RX ADMIN — Medication 1 DROP(S): at 18:18

## 2022-10-30 RX ADMIN — APIXABAN 5 MILLIGRAM(S): 2.5 TABLET, FILM COATED ORAL at 05:07

## 2022-10-30 RX ADMIN — MAGNESIUM OXIDE 400 MG ORAL TABLET 400 MILLIGRAM(S): 241.3 TABLET ORAL at 13:24

## 2022-10-30 RX ADMIN — Medication 250 MILLIGRAM(S): at 23:27

## 2022-10-30 RX ADMIN — DORZOLAMIDE HYDROCHLORIDE 1 DROP(S): 20 SOLUTION/ DROPS OPHTHALMIC at 13:25

## 2022-10-30 RX ADMIN — PANTOPRAZOLE SODIUM 40 MILLIGRAM(S): 20 TABLET, DELAYED RELEASE ORAL at 11:57

## 2022-10-30 RX ADMIN — MEROPENEM 100 MILLIGRAM(S): 1 INJECTION INTRAVENOUS at 21:59

## 2022-10-30 RX ADMIN — Medication 3 MILLILITER(S): at 11:43

## 2022-10-30 RX ADMIN — SODIUM CHLORIDE 10 MILLILITER(S): 9 INJECTION INTRAMUSCULAR; INTRAVENOUS; SUBCUTANEOUS at 06:28

## 2022-10-30 NOTE — PROGRESS NOTE ADULT - SUBJECTIVE AND OBJECTIVE BOX
Patient seen and examined at the bedside.    Remained critically ill on continuous ICU monitoring.    OBJECTIVE:  Vital Signs Last 24 Hrs  T(C): 37 (30 Oct 2022 04:00), Max: 37 (30 Oct 2022 04:00)  T(F): 98.6 (30 Oct 2022 04:00), Max: 98.6 (30 Oct 2022 04:00)  HR: 74 (30 Oct 2022 06:00) (69 - 88)  BP: --  BP(mean): --  RR: 22 (30 Oct 2022 06:00) (16 - 43)  SpO2: 99% (30 Oct 2022 06:00) (96% - 100%)    Parameters below as of 30 Oct 2022 03:03  Patient On (Oxygen Delivery Method): tracheostomy collar  O2 Flow (L/min): 12  O2 Concentration (%): 40      Physical Exam:   General: Trach collar  Neurology: Nonfocal  ENT/Neck: + trach c/d/i, neck supple, trachea midline   Respiratory: coarse BS b/l, rhonchi throughout, minimal secretions  CV: S1S2, no murmurs        [x] Sinus Rhythm   Abdominal: Soft, NT, ND, colostomy in place (stoma intact)  Extremities: +4 RUE edema, 3+LUE edema, trace edema noted, + peripheral pulses   Skin: Dry dressing over right heel, R elbow wound w/ overlying cling dressing c/d/i                        Assessment:  71F PMH DM, COPD, chronic adrenal insufficiency on Prednisone, history of colorectal cancer s/p resection (colostomy bag), Hx of CAD, chronic AF on Eliquis, and tracheomalacia s/p multiple intubations, recent dx of R foot OM s/p debridement on antibiotics and presented to ED at [facility] this morning with epigastric pain, belching and central chest pain. Found on CTA to have type A aortic dissection and transferred to [facility] for surgical evaluation by Dr. Cabrera.     Ascending aortic dissection s/p type A dissection repair and Biobentall on 9/7   Respiratory failure s/p Trach 10/10/22  Hypovolemia   Post op respiratory failure   Acute blood loss anemia  Stress hyperglycemia   RIJ thrombus  MRSA PNA      Plan:   ***Neuro***  [x] Nonfocal  follows simple commands, continue to monitor  PT/OT progress as tolerated, ambulated with physical therapy      ***Cardiovascular***  Limited TTE on 10/1: EF 69%, Hyperdynamic left ventricular systolic function. There is hypokinesis of the mid-base inferior wall. Nml RV fxn.   CT chest on 9/28: No CT evidence of pulmonary embolism. Status post repair of ascending aortic dissection with a stable dissection flap originating from the aortic arch and extending into the infrarenal abdominal aorta,  B/l UE duplex on 10/5: As before, there is an occluded RIGHT IJ vein with thrombosis extending to brachiocephalic vein. Superficial thrombophlebitis of the LEFT cephalic vein.   Invasive hemodynamic monitoring, assess perfusion indices   SR / MAP 59 / Hct 34.2 / Lactate 1.8  Continuos reassessment of hemodynamics   [x] Hydralazine PO for BP management   Rate control with Mexiletine and Lopressor   WVAC on rt elbow  changed 10/26  [x] AC Therapy with Eliquis 5 BID for Afib and IJ thrombus  Serial EKG and cardiac enzymes       ***Pulmonary***  Trach collar 10L/40% for 4 days,   Respiratory failure s/p Trach #8 portex  10/10/22, per ENT inner cannula needs to be changed daily, trach sutures d/c'ed by ENT 10/17 , 40% trach collar for 2 days  Titration of FiO2, follow SpO2, CXR, blood gases   Bronched 10/13  Continue duonebs  Suction q2hrly as needed   Aggressive Pulmonary toilet              ***GI***  [x] Diet:  TFs Glucerna 1.2 @ goal 65ml/hr, tolerating without issues (when glucerna 1.5 back in stock will switch and decrease goal rate to 55ml/hr)  [x] Protonix    Reglan for gut motility        ***Renal***  Continue to monitor I/Os, BUN/Creatinine.   Replete lytes PRN  Diuresis with Lasix       ***ID***  SCx on 10/4+Methicillin resistant Staphylococcus aureus, c/w IVPB Vancomycin, (plan for 6 week course in setting of valve surgery, 11/26 end date)  9/27 blood culture Neela Glabarata, on flucanazole (lifelong suppression)  BCx 10/13 NGTD, BC 10/21 and SCx NGTD  R elbow wound, S/p IR for elbow drainage 10/13 + Few Proteus mirabilis ESBL, Meropenem 7 day trial completed 10/19-> reordered 10/20 for reaccumulation of elbow bursitis-  10/22 plastics debrided R elbow eschar to subc tissue, ~8cc fluid aspirated and sent for culture. Wet to dry dressing changed 2x daily. PT changed vac on 10/28  Joint Fluid Cx 10/22 NTD  Meropenem for 8 day course (until 10/28),started in setting of elbow collection 10/22, f/u ID for possible shorter course given culture NTD 1/288 changed vac by physical therapy woud is healing      ***Endocrine***  [x]  DM : HbA1c 9.4%                - [x] ISS  [x] NPH              - Need tight glycemic control to prevent wound infection.  Endocrine following, appreciate recommendations            Patient requires continuous monitoring with bedside rhythm monitoring, pulse oximetry monitoring, and continuous central venous and arterial pressure monitoring; and intermittent blood gas analysis. Care plan discussed with the ICU care team.   Patient remained critical, at risk for life threatening decompensation.    I have spent 30 minutes providing critical care management to this patient.    By signing my name below, I, Maria D'Amico, attest that this documentation has been prepared under the direction and in the presence of KIANA Garzon   Electronically signed: Maria D'Amico, Scribe, 10-30-22 @ 07:13    I, KIANA Garzon, personally performed the services described in this documentation. all medical record entries made by the scribe were at my direction and in my presence. I have reviewed the chart and agree that the record reflects my personal performance and is accurate and complete  Electronically signed: KIANA Garzon  Patient seen and examined at the bedside.    Remained critically ill on continuous ICU monitoring.  Remains on trach collar. Tolerating well, suctioning requirements minimal with strong cough.     OBJECTIVE:  Vital Signs Last 24 Hrs  T(C): 37 (30 Oct 2022 04:00), Max: 37 (30 Oct 2022 04:00)  T(F): 98.6 (30 Oct 2022 04:00), Max: 98.6 (30 Oct 2022 04:00)  HR: 74 (30 Oct 2022 06:00) (69 - 88)  BP: --  BP(mean): --  RR: 22 (30 Oct 2022 06:00) (16 - 43)  SpO2: 99% (30 Oct 2022 06:00) (96% - 100%)    Parameters below as of 30 Oct 2022 03:03  Patient On (Oxygen Delivery Method): tracheostomy collar  O2 Flow (L/min): 12  O2 Concentration (%): 40      Physical Exam:   General: Trach collar  Neurology: Nonfocal  ENT/Neck: + trach c/d/i, neck supple, trachea midline   Respiratory: coarse BS b/l, rhonchi throughout, minimal secretions  CV: S1S2, no murmurs        [x] Sinus Rhythm   Abdominal: Soft, NT, ND, colostomy in place (stoma intact)  Extremities: +4 RUE edema, 3+LUE edema, trace edema noted, + peripheral pulses   Skin: Dry dressing over right heel, R elbow wound w/ overlying cling dressing c/d/i                        Assessment:  71F PMH DM, COPD, chronic adrenal insufficiency on Prednisone, history of colorectal cancer s/p resection (colostomy bag), Hx of CAD, chronic AF on Eliquis, and tracheomalacia s/p multiple intubations, recent dx of R foot OM s/p debridement on antibiotics and presented to ED at [facility] this morning with epigastric pain, belching and central chest pain. Found on CTA to have type A aortic dissection and transferred to [facility] for surgical evaluation by Dr. Cabrera.     Ascending aortic dissection s/p type A dissection repair and Biobentall on 9/7   Respiratory failure s/p Trach 10/10/22  Hypovolemia   Post op respiratory failure   Acute blood loss anemia  Stress hyperglycemia   RIJ thrombus  MRSA PNA      Plan:   ***Neuro***  [x] Nonfocal  follows simple commands, continue to monitor  PT/OT progress as tolerated, ambulated with physical therapy      ***Cardiovascular***  Limited TTE on 10/1: EF 69%, Hyperdynamic left ventricular systolic function. There is hypokinesis of the mid-base inferior wall. Nml RV fxn.   CT chest on 9/28: No CT evidence of pulmonary embolism. Status post repair of ascending aortic dissection with a stable dissection flap originating from the aortic arch and extending into the infrarenal abdominal aorta,  B/l UE duplex on 10/5: As before, there is an occluded RIGHT IJ vein with thrombosis extending to brachiocephalic vein. Superficial thrombophlebitis of the LEFT cephalic vein.   Invasive hemodynamic monitoring, assess perfusion indices   SR / MAP 59 / Hct 34.2 / Lactate 1.8  Continuos reassessment of hemodynamics   [x] Hydralazine PO for BP management   Rate control with Mexiletine and Lopressor   WVAC on rt elbow  changed 10/26  [x] AC Therapy with Eliquis 5 BID for Afib and IJ thrombus  Serial EKG and cardiac enzymes       ***Pulmonary***  Trach collar 10L/40% for 4 days,   Respiratory failure s/p Trach #8 portex  10/10/22, per ENT inner cannula needs to be changed daily, trach sutures d/c'ed by ENT 10/17 , 40% trach collar for 2 days  Titration of FiO2, follow SpO2, CXR, blood gases   Bronched 10/13  Continue duonebs  Suction q2hrly as needed   Aggressive Pulmonary toilet              ***GI***  [x] Diet:  TFs Glucerna 1.2 @ goal 65ml/hr, tolerating without issues (when glucerna 1.5 back in stock will switch and decrease goal rate to 55ml/hr)  [x] Protonix    Reglan for gut motility        ***Renal***  Continue to monitor I/Os, BUN/Creatinine.   Replete lytes PRN  Diuresis with Lasix       ***ID***  SCx on 10/4+Methicillin resistant Staphylococcus aureus, c/w IVPB Vancomycin, (plan for 6 week course in setting of valve surgery, 11/26 end date)  9/27 blood culture Neela Glabarata, on flucanazole (lifelong suppression)  BCx 10/13 NGTD, BC 10/21 and SCx NGTD  R elbow wound, S/p IR for elbow drainage 10/13 + Few Proteus mirabilis ESBL, Meropenem 7 day trial completed 10/19-> reordered 10/20 for reaccumulation of elbow bursitis-  10/22 plastics debrided R elbow eschar to subc tissue, ~8cc fluid aspirated and sent for culture. Wet to dry dressing changed 2x daily. PT changed vac on 10/28  Joint Fluid Cx 10/22 NTD  Meropenem for 8 day course (until 10/28),started in setting of elbow collection 10/22, f/u ID for possible shorter course given culture NTD 1/288 changed vac by physical therapy woud is healing      ***Endocrine***  [x]  DM : HbA1c 9.4%                - [x] ISS  [x] NPH              - Need tight glycemic control to prevent wound infection.  Endocrine following, appreciate recommendations            Patient requires continuous monitoring with bedside rhythm monitoring, pulse oximetry monitoring, and continuous central venous and arterial pressure monitoring; and intermittent blood gas analysis. Care plan discussed with the ICU care team.   Patient remained critical, at risk for life threatening decompensation.    I have spent 30 minutes providing critical care management to this patient.    By signing my name below, I, Maria D'Amico, attest that this documentation has been prepared under the direction and in the presence of KIANA Garzon   Electronically signed: Maria D'Amico, Scribe, 10-30-22 @ 07:13    I, KIANA Garzon, personally performed the services described in this documentation. all medical record entries made by the marcyibe were at my direction and in my presence. I have reviewed the chart and agree that the record reflects my personal performance and is accurate and complete  Electronically signed: KIANA Garzon

## 2022-10-30 NOTE — PROGRESS NOTE ADULT - SUBJECTIVE AND OBJECTIVE BOX
[facility] DIVISION OF PULMONARY, CRITICAL CARE and SLEEP MEDICINE  PULMONARY PROGRESS NOTE  OFFICE NUMBER: [phone]    PATIENT INFORMATION:  NAME: RAYRAY RODRIGUEZ:  MRN: [id number]    CHIEF COMPLAINT: Patient is a 72y old  Female who presents with a chief complaint of Type A aortic dissection (30 Oct 2022 07:13)      [x] INITIAL CONSULT, H&P, FAMILY HISTORY and PAST MEDICAL AND SURGICAL HISTORY REVIEWED    OVERNIGHT EVENTS or CHANGES TO HPI:     ========================REVIEW OF SYSTEMS========================  CONSTITUTIONAL:  CARDIOVASCULAR:  PULMONARY:  [] REMAINING REVIEW OF SYSTEMS NEGATIVE  [] UNABLE TO OBTAIN REVIEW OF SYSTEMS DUE TO _______________    ========================MEDICATIONS=============================  MEDICATIONS  (STANDING):  acetylcysteine 10%  Inhalation 4 milliLiter(s) Inhalation every 12 hours  albuterol/ipratropium for Nebulization 3 milliLiter(s) Nebulizer every 6 hours  apixaban 5 milliGRAM(s) Oral every 12 hours  ascorbic acid 500 milliGRAM(s) Oral daily  buDESOnide    Inhalation Suspension 0.5 milliGRAM(s) Inhalation every 12 hours  chlorhexidine 2% Cloths 1 Application(s) Topical <User Schedule>  collagenase Ointment 1 Application(s) Topical daily  dextrose 50% Injectable 50 milliLiter(s) IV Push every 15 minutes  dorzolamide 2% Ophthalmic Solution 1 Drop(s) Left EYE three times a day  fluconAZOLE IVPB 600 milliGRAM(s) IV Intermittent every 24 hours  hydrALAZINE 50 milliGRAM(s) Oral every 8 hours  insulin lispro (ADMELOG) corrective regimen sliding scale   SubCutaneous every 6 hours  insulin NPH human recombinant 25 Unit(s) SubCutaneous <User Schedule>  insulin NPH human recombinant 8 Unit(s) SubCutaneous <User Schedule>  insulin NPH human recombinant 10 Unit(s) SubCutaneous <User Schedule>  insulin NPH human recombinant 16 Unit(s) SubCutaneous <User Schedule>  magnesium oxide 400 milliGRAM(s) Oral every 8 hours  meropenem  IVPB      meropenem  IVPB 1000 milliGRAM(s) IV Intermittent every 8 hours  methylPREDNISolone 8 milliGRAM(s) Oral daily  metoclopramide Injectable 5 milliGRAM(s) IV Push every 8 hours  metoprolol tartrate 12.5 milliGRAM(s) Enteral Tube every 12 hours  mexiletine 200 milliGRAM(s) Oral every 8 hours  multivitamin 1 Tablet(s) Oral daily  pantoprazole  Injectable 40 milliGRAM(s) IV Push daily  sodium chloride 0.9%. 1000 milliLiter(s) (10 mL/Hr) IV Continuous <Continuous>  timolol 0.5% Solution 1 Drop(s) Left EYE two times a day  vancomycin  IVPB 750 milliGRAM(s) IV Intermittent every 12 hours  vancomycin  IVPB          MEDICATIONS  (PRN):  acetaminophen    Suspension .. 650 milliGRAM(s) Oral every 6 hours PRN Temp greater or equal to 38C (100.4F), Mild Pain (1 - 3)      ========================PHYSICAL EXAM============================    VITALS: ICU Vital Signs Last 24 Hrs  T(C): 37.1 (30 Oct 2022 08:00), Max: 37.1 (30 Oct 2022 08:00)  T(F): 98.8 (30 Oct 2022 08:00), Max: 98.8 (30 Oct 2022 08:00)  HR: 82 (30 Oct 2022 11:58) (69 - 86)  BP: --  BP(mean): --  ABP: 120/47 (30 Oct 2022 10:00) (115/39 - 164/45)  ABP(mean): 68 (30 Oct 2022 10:00) (59 - 92)  RR: 18 (30 Oct 2022 10:00) (16 - 43)  SpO2: 100% (30 Oct 2022 11:58) (94% - 100%)    O2 Parameters below as of 30 Oct 2022 11:58  Patient On (Oxygen Delivery Method): tracheostomy collar            INTAKE and OUTPUT: I&O's Summary    29 Oct 2022 07:01  -  30 Oct 2022 07:00  --------------------------------------------------------  IN: 2930 mL / OUT: 1375 mL / NET: 1555 mL    30 Oct 2022 07:01  -  30 Oct 2022 13:51  --------------------------------------------------------  IN: 225 mL / OUT: 800 mL / NET: -575 mL        VENTILATOR SETTINGS:     GENERAL:   EYES:  EAR/NOSE/MOUTH/THROAT:  NECK:  LYMPH NODES:  CARDIOVASCULAR:  RESPIRATORY:  ABDOMEN:  EXTREMITIES  SKIN:  MUSCULOSKELETAL:   NEUROLOGIC:  PSYCHIATRIC    ========================LABORATORY RESULTS AND IMAGING=============                        9.9    13.92 )-----------( 342      ( 30 Oct 2022 00:33 )             34.2                                                    10-30    141  |  101  |  24<H>  ----------------------------<  152<H>  4.5   |  31  |  0.43<L>    Ca    9.7      30 Oct 2022 00:33  Phos  3.1     10-30  Mg     2.0     10-30    TPro  7.2  /  Alb  3.4  /  TBili  0.2  /  DBili  x   /  AST  17  /  ALT  20  /  AlkPhos  131<H>  10-30      ABG - ( 30 Oct 2022 00:35 )  pH, Arterial: 7.41  pH, Blood: x     /  pCO2: 50    /  pO2: 163   / HCO3: 32    / Base Excess: 6.1   /  SaO2: 99.0                Creatinine Trend: 0.43<--, 0.42<--, 0.49<--, 0.49<--, 0.49<--, 0.47<--    CT CHEST:     [] RADIOLOGY REVIEWED AND INTERPRETED BY ME      THANK YOU FOR ALLOWING US TO PARTICIPATE IN THE CARE OF THIS PATIENT     [facility] DIVISION OF PULMONARY, CRITICAL CARE and SLEEP MEDICINE  PULMONARY PROGRESS NOTE  OFFICE NUMBER: [phone]    PATIENT INFORMATION:  NAME: RAYRAY RODRIGUEZ:  MRN: [id number]    CHIEF COMPLAINT: Patient is a 72y old  Female who presents with a chief complaint of Type A aortic dissection (30 Oct 2022 07:13)      [x] INITIAL CONSULT, H&P, FAMILY HISTORY and PAST MEDICAL AND SURGICAL HISTORY REVIEWED    OVERNIGHT EVENTS or CHANGES TO HPI:   none  ========================REVIEW OF SYSTEMS========================  CONSTITUTIONAL: feels cold  CARDIOVASCULAR:  PULMONARY: breathing is OK  [x] REMAINING REVIEW OF SYSTEMS NEGATIVE  [] UNABLE TO OBTAIN REVIEW OF SYSTEMS DUE TO _______________    ========================MEDICATIONS=============================  MEDICATIONS  (STANDING):  acetylcysteine 10%  Inhalation 4 milliLiter(s) Inhalation every 12 hours  albuterol/ipratropium for Nebulization 3 milliLiter(s) Nebulizer every 6 hours  apixaban 5 milliGRAM(s) Oral every 12 hours  ascorbic acid 500 milliGRAM(s) Oral daily  buDESOnide    Inhalation Suspension 0.5 milliGRAM(s) Inhalation every 12 hours  chlorhexidine 2% Cloths 1 Application(s) Topical <User Schedule>  collagenase Ointment 1 Application(s) Topical daily  dextrose 50% Injectable 50 milliLiter(s) IV Push every 15 minutes  dorzolamide 2% Ophthalmic Solution 1 Drop(s) Left EYE three times a day  fluconAZOLE IVPB 600 milliGRAM(s) IV Intermittent every 24 hours  hydrALAZINE 50 milliGRAM(s) Oral every 8 hours  insulin lispro (ADMELOG) corrective regimen sliding scale   SubCutaneous every 6 hours  insulin NPH human recombinant 25 Unit(s) SubCutaneous <User Schedule>  insulin NPH human recombinant 8 Unit(s) SubCutaneous <User Schedule>  insulin NPH human recombinant 10 Unit(s) SubCutaneous <User Schedule>  insulin NPH human recombinant 16 Unit(s) SubCutaneous <User Schedule>  magnesium oxide 400 milliGRAM(s) Oral every 8 hours  meropenem  IVPB      meropenem  IVPB 1000 milliGRAM(s) IV Intermittent every 8 hours  methylPREDNISolone 8 milliGRAM(s) Oral daily  metoclopramide Injectable 5 milliGRAM(s) IV Push every 8 hours  metoprolol tartrate 12.5 milliGRAM(s) Enteral Tube every 12 hours  mexiletine 200 milliGRAM(s) Oral every 8 hours  multivitamin 1 Tablet(s) Oral daily  pantoprazole  Injectable 40 milliGRAM(s) IV Push daily  sodium chloride 0.9%. 1000 milliLiter(s) (10 mL/Hr) IV Continuous <Continuous>  timolol 0.5% Solution 1 Drop(s) Left EYE two times a day  vancomycin  IVPB 750 milliGRAM(s) IV Intermittent every 12 hours  vancomycin  IVPB          MEDICATIONS  (PRN):  acetaminophen    Suspension .. 650 milliGRAM(s) Oral every 6 hours PRN Temp greater or equal to 38C (100.4F), Mild Pain (1 - 3)      ========================PHYSICAL EXAM============================    VITALS: ICU Vital Signs Last 24 Hrs  T(C): 37.1 (30 Oct 2022 08:00), Max: 37.1 (30 Oct 2022 08:00)  T(F): 98.8 (30 Oct 2022 08:00), Max: 98.8 (30 Oct 2022 08:00)  HR: 82 (30 Oct 2022 11:58) (69 - 86)  BP: --  BP(mean): --  ABP: 120/47 (30 Oct 2022 10:00) (115/39 - 164/45)  ABP(mean): 68 (30 Oct 2022 10:00) (59 - 92)  RR: 18 (30 Oct 2022 10:00) (16 - 43)  SpO2: 100% (30 Oct 2022 11:58) (94% - 100%)    O2 Parameters below as of 30 Oct 2022 11:58  Patient On (Oxygen Delivery Method): tracheostomy collar            INTAKE and OUTPUT: I&O's Summary    29 Oct 2022 07:01  -  30 Oct 2022 07:00  --------------------------------------------------------  IN: 2930 mL / OUT: 1375 mL / NET: 1555 mL    30 Oct 2022 07:01  -  30 Oct 2022 13:51  --------------------------------------------------------  IN: 225 mL / OUT: 800 mL / NET: -575 mL    On TC 40%    GENERAL: Awake, alert, sitting up in chair  CARDIOVASCULAR: regular  RESPIRATORY: clear mostly anteror with occasional rhonchus  ABDOMEN:  EXTREMITIES without edema  SKIN:  MUSCULOSKELETAL:   NEUROLOGIC:  PSYCHIATRIC awake alert, communicative.      ========================LABORATORY RESULTS AND IMAGING=============                        9.9    13.92 )-----------( 342      ( 30 Oct 2022 00:33 )             34.2                                                    10-30    141  |  101  |  24<H>  ----------------------------<  152<H>  4.5   |  31  |  0.43<L>    Ca    9.7      30 Oct 2022 00:33  Phos  3.1     10-30  Mg     2.0     10-30    TPro  7.2  /  Alb  3.4  /  TBili  0.2  /  DBili  x   /  AST  17  /  ALT  20  /  AlkPhos  131<H>  10-30      ABG - ( 30 Oct 2022 00:35 )  pH, Arterial: 7.41  pH, Blood: x     /  pCO2: 50    /  pO2: 163   / HCO3: 32    / Base Excess: 6.1   /  SaO2: 99.0                Creatinine Trend: 0.43<--, 0.42<--, 0.49<--, 0.49<--, 0.49<--, 0.47<--    CT CHEST:     [] RADIOLOGY REVIEWED AND INTERPRETED BY ME      THANK YOU FOR ALLOWING US TO PARTICIPATE IN THE CARE OF THIS PATIENT

## 2022-10-30 NOTE — CHART NOTE - NSCHARTNOTEFT_GEN_A_CORE
Age: 72y    Gender: Female    POCT Blood Glucose:288 (10-30-22 @ 12:00)  149 (10-29-22 @ 17:00)    288 mg/dL (10-30-22 @ 11:38)  300 mg/dL (10-30-22 @ 11:33)  149 mg/dL (10-30-22 @ 05:34)  145 mg/dL (10-30-22 @ 00:40)  149 mg/dL (10-29-22 @ 17:15)      eMAR:  insulin lispro (ADMELOG) corrective regimen sliding scale   6 Unit(s) SubCutaneous (10-30-22 @ 11:55)    insulin NPH human recombinant   25 Unit(s) SubCutaneous (10-30-22 @ 05:55)    insulin NPH human recombinant   8 Unit(s) SubCutaneous (10-29-22 @ 17:16)    insulin NPH human recombinant   10 Unit(s) SubCutaneous (10-30-22 @ 00:58)    insulin NPH human recombinant   16 Unit(s) SubCutaneous (10-30-22 @ 11:57)    methylPREDNISolone   8 milliGRAM(s) Oral (10-30-22 @ 05:09)     POC glucose, insulin requirements, lab values reviewed.  Noon BG remains above goal, Increased 6am NPH to 30 units starting tomorrow  continue with rest of regimen per last progress note

## 2022-10-30 NOTE — PROGRESS NOTE ADULT - ASSESSMENT
71 year-old female with a history of DM2, CAD, A-Fib on apixaban, Severe Persistent Asthma (on chronic steroids, recently started on Tezspire), colon cancer s/p resection/chemo, and tracheobronchomalacia s/p tracheoplasty, and recent OM of the R foot s/b debridement and completed course of Vancomycin/Ertapenem for MRSA/ESBL Proteus/Corynebacterium who now presents with chest pain, found to have Type A dissection s/p repair with modified Bentall procedure and hemiarch replacement on 9/6/22. Post-op course complicated by acute hypoxemic respiratory failure, shock, anemia, and hyperglycemia requiring insulin gtt. Extubated 9/13, now awake and alert with mild encephalopathy but overall significantly improved.    Assessment:  Acute hypoxemic respiratory failure requiring intubation  Type A Aortic Dissection  Severe Persistent Asthma  History of Tracheobronchomalacia     71 year-old female with a history of DM2, CAD, A-Fib on apixaban, Severe Persistent Asthma (on chronic steroids, recently started on Tezspire), colon cancer s/p resection/chemo, and tracheobronchomalacia s/p tracheoplasty, and recent OM of the R foot s/b debridement and completed course of Vancomycin/Ertapenem for MRSA/ESBL Proteus/Corynebacterium who now presents with chest pain, found to have Type A dissection s/p repair with modified Bentall procedure and hemiarch replacement on 9/6/22. Post-op course complicated by acute hypoxemic respiratory failure, shock, anemia, and hyperglycemia requiring insulin gtt. Extubated 9/13, now awake and alert with mild encephalopathy but overall significantly improved.  She is on TC, alert.   Most recent ABG today 7.41/50/163    Rec:  - decrease FiO2 on TC as tolerated, requires sats > 92%  -Continue budesonide, steroids, and duonebs  - further management as per CTICU team.    Assessment:  Acute hypoxemic respiratory failure requiring intubation  Type A Aortic Dissection  Severe Persistent Asthma  History of Tracheobronchomalacia

## 2022-10-31 LAB
ALBUMIN SERPL ELPH-MCNC: 3.1 G/DL — LOW (ref 3.3–5)
ALP SERPL-CCNC: 128 U/L — HIGH (ref 40–120)
ALT FLD-CCNC: 18 U/L — SIGNIFICANT CHANGE UP (ref 10–45)
ANION GAP SERPL CALC-SCNC: 10 MMOL/L — SIGNIFICANT CHANGE UP (ref 5–17)
AST SERPL-CCNC: 19 U/L — SIGNIFICANT CHANGE UP (ref 10–40)
BILIRUB SERPL-MCNC: 0.3 MG/DL — SIGNIFICANT CHANGE UP (ref 0.2–1.2)
BUN SERPL-MCNC: 24 MG/DL — HIGH (ref 7–23)
CALCIUM SERPL-MCNC: 9.4 MG/DL — SIGNIFICANT CHANGE UP (ref 8.4–10.5)
CHLORIDE SERPL-SCNC: 100 MMOL/L — SIGNIFICANT CHANGE UP (ref 96–108)
CO2 SERPL-SCNC: 29 MMOL/L — SIGNIFICANT CHANGE UP (ref 22–31)
CREAT SERPL-MCNC: 0.45 MG/DL — LOW (ref 0.5–1.3)
EGFR: 101 ML/MIN/1.73M2 — SIGNIFICANT CHANGE UP
GAS PNL BLDA: SIGNIFICANT CHANGE UP
GLUCOSE BLDC GLUCOMTR-MCNC: 113 MG/DL — HIGH (ref 70–99)
GLUCOSE BLDC GLUCOMTR-MCNC: 143 MG/DL — HIGH (ref 70–99)
GLUCOSE BLDC GLUCOMTR-MCNC: 144 MG/DL — HIGH (ref 70–99)
GLUCOSE BLDC GLUCOMTR-MCNC: 176 MG/DL — HIGH (ref 70–99)
GLUCOSE BLDC GLUCOMTR-MCNC: 273 MG/DL — HIGH (ref 70–99)
GLUCOSE SERPL-MCNC: 171 MG/DL — HIGH (ref 70–99)
HCT VFR BLD CALC: 32.5 % — LOW (ref 34.5–45)
HGB BLD-MCNC: 9.5 G/DL — LOW (ref 11.5–15.5)
MAGNESIUM SERPL-MCNC: 1.8 MG/DL — SIGNIFICANT CHANGE UP (ref 1.6–2.6)
MCHC RBC-ENTMCNC: 23.1 PG — LOW (ref 27–34)
MCHC RBC-ENTMCNC: 29.2 GM/DL — LOW (ref 32–36)
MCV RBC AUTO: 78.9 FL — LOW (ref 80–100)
NRBC # BLD: 0 /100 WBCS — SIGNIFICANT CHANGE UP (ref 0–0)
PHOSPHATE SERPL-MCNC: 3 MG/DL — SIGNIFICANT CHANGE UP (ref 2.5–4.5)
PLATELET # BLD AUTO: 311 K/UL — SIGNIFICANT CHANGE UP (ref 150–400)
POTASSIUM SERPL-MCNC: 3.9 MMOL/L — SIGNIFICANT CHANGE UP (ref 3.5–5.3)
POTASSIUM SERPL-SCNC: 3.9 MMOL/L — SIGNIFICANT CHANGE UP (ref 3.5–5.3)
PROT SERPL-MCNC: 7 G/DL — SIGNIFICANT CHANGE UP (ref 6–8.3)
RBC # BLD: 4.12 M/UL — SIGNIFICANT CHANGE UP (ref 3.8–5.2)
RBC # FLD: 22 % — HIGH (ref 10.3–14.5)
SODIUM SERPL-SCNC: 139 MMOL/L — SIGNIFICANT CHANGE UP (ref 135–145)
VANCOMYCIN TROUGH SERPL-MCNC: 17.1 UG/ML — SIGNIFICANT CHANGE UP (ref 10–20)
VANCOMYCIN TROUGH SERPL-MCNC: 17.7 UG/ML — SIGNIFICANT CHANGE UP (ref 10–20)
WBC # BLD: 13.01 K/UL — HIGH (ref 3.8–10.5)
WBC # FLD AUTO: 13.01 K/UL — HIGH (ref 3.8–10.5)

## 2022-10-31 PROCEDURE — 99232 SBSQ HOSP IP/OBS MODERATE 35: CPT

## 2022-10-31 PROCEDURE — 71045 X-RAY EXAM CHEST 1 VIEW: CPT | Mod: 26

## 2022-10-31 PROCEDURE — 99291 CRITICAL CARE FIRST HOUR: CPT | Mod: 24

## 2022-10-31 RX ORDER — ACETAMINOPHEN 500 MG
1000 TABLET ORAL ONCE
Refills: 0 | Status: COMPLETED | OUTPATIENT
Start: 2022-10-31 | End: 2022-10-31

## 2022-10-31 RX ORDER — POTASSIUM CHLORIDE 20 MEQ
40 PACKET (EA) ORAL ONCE
Refills: 0 | Status: COMPLETED | OUTPATIENT
Start: 2022-10-31 | End: 2022-10-31

## 2022-10-31 RX ORDER — HUMAN INSULIN 100 [IU]/ML
35 INJECTION, SUSPENSION SUBCUTANEOUS
Refills: 0 | Status: DISCONTINUED | OUTPATIENT
Start: 2022-11-01 | End: 2022-11-01

## 2022-10-31 RX ORDER — MAGNESIUM SULFATE 500 MG/ML
2 VIAL (ML) INJECTION ONCE
Refills: 0 | Status: COMPLETED | OUTPATIENT
Start: 2022-10-31 | End: 2022-10-31

## 2022-10-31 RX ORDER — DIPHENHYDRAMINE HCL/ZINC ACET 2 %-0.1 %
1 CREAM (GRAM) TOPICAL EVERY 8 HOURS
Refills: 0 | Status: DISCONTINUED | OUTPATIENT
Start: 2022-10-31 | End: 2022-11-22

## 2022-10-31 RX ORDER — HUMAN INSULIN 100 [IU]/ML
14 INJECTION, SUSPENSION SUBCUTANEOUS
Refills: 0 | Status: DISCONTINUED | OUTPATIENT
Start: 2022-11-01 | End: 2022-11-01

## 2022-10-31 RX ADMIN — Medication 5 MILLIGRAM(S): at 15:16

## 2022-10-31 RX ADMIN — Medication 4 MILLILITER(S): at 05:29

## 2022-10-31 RX ADMIN — Medication 1 TABLET(S): at 12:38

## 2022-10-31 RX ADMIN — Medication 0.5 MILLIGRAM(S): at 17:13

## 2022-10-31 RX ADMIN — Medication 1 APPLICATION(S): at 12:39

## 2022-10-31 RX ADMIN — Medication 3 MILLILITER(S): at 00:11

## 2022-10-31 RX ADMIN — APIXABAN 5 MILLIGRAM(S): 2.5 TABLET, FILM COATED ORAL at 05:10

## 2022-10-31 RX ADMIN — MAGNESIUM OXIDE 400 MG ORAL TABLET 400 MILLIGRAM(S): 241.3 TABLET ORAL at 22:27

## 2022-10-31 RX ADMIN — MAGNESIUM OXIDE 400 MG ORAL TABLET 400 MILLIGRAM(S): 241.3 TABLET ORAL at 15:17

## 2022-10-31 RX ADMIN — CHLORHEXIDINE GLUCONATE 1 APPLICATION(S): 213 SOLUTION TOPICAL at 06:42

## 2022-10-31 RX ADMIN — MEROPENEM 100 MILLIGRAM(S): 1 INJECTION INTRAVENOUS at 15:16

## 2022-10-31 RX ADMIN — HUMAN INSULIN 30 UNIT(S): 100 INJECTION, SUSPENSION SUBCUTANEOUS at 06:53

## 2022-10-31 RX ADMIN — Medication 0.5 MILLIGRAM(S): at 05:29

## 2022-10-31 RX ADMIN — Medication 650 MILLIGRAM(S): at 22:28

## 2022-10-31 RX ADMIN — Medication 5 MILLIGRAM(S): at 22:28

## 2022-10-31 RX ADMIN — Medication 40 MILLIEQUIVALENT(S): at 00:54

## 2022-10-31 RX ADMIN — Medication 6: at 12:37

## 2022-10-31 RX ADMIN — MEROPENEM 100 MILLIGRAM(S): 1 INJECTION INTRAVENOUS at 22:25

## 2022-10-31 RX ADMIN — Medication 3 MILLILITER(S): at 05:29

## 2022-10-31 RX ADMIN — PANTOPRAZOLE SODIUM 40 MILLIGRAM(S): 20 TABLET, DELAYED RELEASE ORAL at 12:38

## 2022-10-31 RX ADMIN — Medication 400 MILLIGRAM(S): at 08:00

## 2022-10-31 RX ADMIN — MEROPENEM 100 MILLIGRAM(S): 1 INJECTION INTRAVENOUS at 05:09

## 2022-10-31 RX ADMIN — MEXILETINE HYDROCHLORIDE 200 MILLIGRAM(S): 150 CAPSULE ORAL at 22:28

## 2022-10-31 RX ADMIN — MEXILETINE HYDROCHLORIDE 200 MILLIGRAM(S): 150 CAPSULE ORAL at 15:16

## 2022-10-31 RX ADMIN — Medication 50 MILLIGRAM(S): at 22:28

## 2022-10-31 RX ADMIN — Medication 3 MILLILITER(S): at 17:13

## 2022-10-31 RX ADMIN — Medication 1 DROP(S): at 05:11

## 2022-10-31 RX ADMIN — Medication 50 MILLIGRAM(S): at 15:16

## 2022-10-31 RX ADMIN — APIXABAN 5 MILLIGRAM(S): 2.5 TABLET, FILM COATED ORAL at 18:47

## 2022-10-31 RX ADMIN — Medication 650 MILLIGRAM(S): at 22:58

## 2022-10-31 RX ADMIN — Medication 12.5 MILLIGRAM(S): at 19:12

## 2022-10-31 RX ADMIN — Medication 2: at 00:35

## 2022-10-31 RX ADMIN — DORZOLAMIDE HYDROCHLORIDE 1 DROP(S): 20 SOLUTION/ DROPS OPHTHALMIC at 22:29

## 2022-10-31 RX ADMIN — Medication 1 DROP(S): at 18:47

## 2022-10-31 RX ADMIN — MAGNESIUM OXIDE 400 MG ORAL TABLET 400 MILLIGRAM(S): 241.3 TABLET ORAL at 05:10

## 2022-10-31 RX ADMIN — Medication 8 MILLIGRAM(S): at 05:10

## 2022-10-31 RX ADMIN — DORZOLAMIDE HYDROCHLORIDE 1 DROP(S): 20 SOLUTION/ DROPS OPHTHALMIC at 16:05

## 2022-10-31 RX ADMIN — Medication 250 MILLIGRAM(S): at 22:29

## 2022-10-31 RX ADMIN — Medication 25 GRAM(S): at 02:19

## 2022-10-31 RX ADMIN — Medication 3 MILLILITER(S): at 11:23

## 2022-10-31 RX ADMIN — Medication 500 MILLIGRAM(S): at 12:39

## 2022-10-31 RX ADMIN — Medication 5 MILLIGRAM(S): at 05:10

## 2022-10-31 RX ADMIN — Medication 3 MILLILITER(S): at 23:56

## 2022-10-31 RX ADMIN — Medication 50 MILLIGRAM(S): at 05:09

## 2022-10-31 RX ADMIN — Medication 12.5 MILLIGRAM(S): at 05:09

## 2022-10-31 RX ADMIN — HUMAN INSULIN 8 UNIT(S): 100 INJECTION, SUSPENSION SUBCUTANEOUS at 18:47

## 2022-10-31 RX ADMIN — Medication 1000 MILLIGRAM(S): at 08:30

## 2022-10-31 RX ADMIN — DORZOLAMIDE HYDROCHLORIDE 1 DROP(S): 20 SOLUTION/ DROPS OPHTHALMIC at 05:11

## 2022-10-31 RX ADMIN — Medication 1 APPLICATION(S): at 22:30

## 2022-10-31 RX ADMIN — HUMAN INSULIN 10 UNIT(S): 100 INJECTION, SUSPENSION SUBCUTANEOUS at 00:35

## 2022-10-31 RX ADMIN — FLUCONAZOLE 150 MILLIGRAM(S): 150 TABLET ORAL at 22:29

## 2022-10-31 RX ADMIN — Medication 250 MILLIGRAM(S): at 13:20

## 2022-10-31 RX ADMIN — MEXILETINE HYDROCHLORIDE 200 MILLIGRAM(S): 150 CAPSULE ORAL at 05:09

## 2022-10-31 RX ADMIN — Medication 4 MILLILITER(S): at 17:14

## 2022-10-31 RX ADMIN — HUMAN INSULIN 16 UNIT(S): 100 INJECTION, SUSPENSION SUBCUTANEOUS at 12:40

## 2022-10-31 NOTE — PROGRESS NOTE ADULT - SUBJECTIVE AND OBJECTIVE BOX
Diabetes Follow up note:    Chief complaint: T2DM w/steroid induced hyperglycemia    Interval Hx: BG values 130-high 200s over past 24 hours. Pt seen at bedside w/daughter present. Pt tolerating TF @ goal rate. Able to communicate appropriately using paper and pen per daughter.     Review of Systems:  General: denies pain  GI: Tolerating TFs. Denies N/V/D/Abd pain  CV: Denies CP/SOB  ENDO: No S&Sx of hypoglycemia    MEDS:  insulin lispro (ADMELOG) corrective regimen sliding scale   SubCutaneous every 6 hours  insulin NPH human recombinant 8 Unit(s) SubCutaneous <User Schedule>  insulin NPH human recombinant 30 Unit(s) SubCutaneous <User Schedule>  insulin NPH human recombinant 10 Unit(s) SubCutaneous <User Schedule>  insulin NPH human recombinant 16 Unit(s) SubCutaneous <User Schedule>  methylPREDNISolone 8 milliGRAM(s) Oral daily    fluconAZOLE IVPB 600 milliGRAM(s) IV Intermittent every 24 hours  meropenem  IVPB      meropenem  IVPB 1000 milliGRAM(s) IV Intermittent every 8 hours  vancomycin  IVPB 750 milliGRAM(s) IV Intermittent every 12 hours  vancomycin  IVPB        Allergies    aspirin (Short breath)  Avelox (Short breath; Pruritus)  cefepime (Anaphylaxis)  codeine (Short breath)  Dilaudid (Short breath)  iodine (Short breath; Swelling)  penicillin (Anaphylaxis)  shellfish (Anaphylaxis)  tetanus toxoid (Short breath)  Valium (Short breath)        PE:  General: Female lying in bed. NAD>   Vital Signs Last 24 Hrs  T(C): 36.9 (31 Oct 2022 12:00), Max: 37.2 (30 Oct 2022 16:00)  T(F): 98.5 (31 Oct 2022 12:00), Max: 99 (30 Oct 2022 16:00)  HR: 86 (31 Oct 2022 14:00) (60 - 86)  BP: --  BP(mean): --  RR: 20 (31 Oct 2022 14:00) (18 - 39)  SpO2: 100% (31 Oct 2022 14:00) (96% - 100%)    Parameters below as of 31 Oct 2022 12:00  Patient On (Oxygen Delivery Method): tracheostomy collar  O2 Flow (L/min): 10  O2 Concentration (%): 40  HEENT: trach in place. NG tube in place.   Abd: Soft, NT,ND,   Extremities: Warm  Neuro: Awake Alert. Appropriate to situation.     LABS:  POCT Blood Glucose.: 273 mg/dL (10-31-22 @ 12:35)  POCT Blood Glucose.: 144 mg/dL (10-31-22 @ 06:50)  POCT Blood Glucose.: 176 mg/dL (10-31-22 @ 00:27)  POCT Blood Glucose.: 131 mg/dL (10-30-22 @ 18:14)  POCT Blood Glucose.: 288 mg/dL (10-30-22 @ 11:38)  POCT Blood Glucose.: 300 mg/dL (10-30-22 @ 11:33)  POCT Blood Glucose.: 149 mg/dL (10-30-22 @ 05:34)  POCT Blood Glucose.: 145 mg/dL (10-30-22 @ 00:40)  POCT Blood Glucose.: 149 mg/dL (10-29-22 @ 17:15)  POCT Blood Glucose.: 310 mg/dL (10-29-22 @ 11:52)  POCT Blood Glucose.: 304 mg/dL (10-29-22 @ 11:51)  POCT Blood Glucose.: 177 mg/dL (10-29-22 @ 05:55)  POCT Blood Glucose.: 56 mg/dL (10-28-22 @ 23:44)  POCT Blood Glucose.: 56 mg/dL (10-28-22 @ 23:42)  POCT Blood Glucose.: 205 mg/dL (10-28-22 @ 17:57)                            9.5    13.01 )-----------( 311      ( 31 Oct 2022 01:03 )             32.5       10-31    139  |  100  |  24<H>  ----------------------------<  171<H>  3.9   |  29  |  0.45<L>    eGFR: 101    Ca    9.4      10-31  Mg     1.8     10-31  Phos  3.0     10-31    TPro  7.0  /  Alb  3.1<L>  /  TBili  0.3  /  DBili  x   /  AST  19  /  ALT  18  /  AlkPhos  128<H>  10-31      Thyroid Function Tests:  10-03 @ 04:47 TSH 0.32 FreeT4 -- T3 -- Anti TPO -- Anti Thyroglobulin Ab -- TSI --      A1C with Estimated Average Glucose Result: 9.4 % (09-06-22 @ 16:46)  A1C with Estimated Average Glucose Result: 9.2 % (07-11-22 @ 07:36)  A1C with Estimated Average Glucose Result: 8.0 % (05-10-22 @ 12:26)  A1C with Estimated Average Glucose Result: 9.5 % (03-29-22 @ 13:50)  A1C with Estimated Average Glucose Result: 7.8 % (11-30-21 @ 09:05)          Contact number: karoline [phone] or [phone]

## 2022-10-31 NOTE — PROGRESS NOTE ADULT - ASSESSMENT
72 F w/h/o uncontrolled T2DM (A1C 9.4%) on basal/bolus insulin PTA. Unknown DM complications. Also COPD, secondary adrenal Insufficiency on chronic steroids, colorectal cancer s/p resection (colostomy bag), Chronic A fib on Eliquis, and tracheomalacia and multiple intubations, recent dx of OM presented to ED at [facility] with epigastric pain, belching and central chest pain. Found on CTA to have type A aortic dissection and transferred to [facility] for surgical evaluation by Dr. Cabrera. Now s/p aortic dissection repair on 9/07/22. Endocrine consulted for assistance with uncontrolled DM/steroids for AI. Pt in ICU with ventricular dysfunction/sepsis, and now s/p trach 10/10 and more recently s/p R elbow eschar debridement 10/22 > Wound VAC 10/24.  On Prednisone dose 8mg for AI. Tolerating TF @ goal rate w/hyperglycemia around noon daily to 200s due to steroid action. BG goal (100-180mg/dl).

## 2022-10-31 NOTE — PROGRESS NOTE ADULT - ASSESSMENT
71 year-old female with a history of DM2, CAD, A-Fib on apixaban, Severe Persistent Asthma (on chronic steroids, recently started on Tezspire), colon cancer s/p resection/chemo, and tracheobronchomalacia s/p tracheoplasty, and recent OM of the R foot s/b debridement and completed course of Vancomycin/Ertapenem for MRSA/ESBL Proteus/Corynebacterium who now presents with chest pain, found to have Type A dissection s/p repair with modified Bentall procedure and hemiarch replacement on 9/6/22. Post-op course complicated by acute hypoxemic respiratory failure, shock, anemia, and hyperglycemia requiring insulin gtt. Extubated 9/13, now awake and alert with mild encephalopathy but overall significantly improved.    Assessment:  Acute hypoxemic respiratory failure requiring intubation  Type A Aortic Dissection  Severe Persistent Asthma  History of Tracheobronchomalacia  fevers and MSSA bacteremia and tracheal aspirate material with MSSA    -leukocytosis  Fevers and MRSA bacteremia last + culture 9/28  last positive Candida blood culture 9/30  Continue IV vancomycin- trough remains therapeutic  Right elbow growing ESBL Proteus -continue Meropenem and she may need further drainage or a washout to the joint given persistent positive cultures and leukocytosis, with next wound vac change would send a repeat photo and culture of the elbow drainage        MRSA  bacteremia and candidemia  Will plan to treat for 6 weeks in the setting of post surgical repair of thoracic aorta dissection through 11/15/22 after which she may need chronic oral suppression with Bactrim plus fluconazole  Continue to follow CBC  Continue to follow creatinine  Continue to follow Vanco trough levels Vancomycin Level, Trough: 17.1:   repeat blood cultures for evidence of fungemia and bacteremia clearance show evidence of clearing of infections  ESBL proteus in elbow fluid sensitive to meropenem and more recent aspiration 10/22 is again growing Proteus ESBL type- may need a more definitive washout of bursa to eradicate infection        Jeff Calixto MD  Can be called via Teams  After 5pm/weekends [phone]

## 2022-10-31 NOTE — PROGRESS NOTE ADULT - TIME BILLING
as above: resp stable--wound care f/up-TC continues- (she will always have secretions) due to TBM-s/p  trach 10/10-s/p  resp failure-s/p hzobbo-OOED-hj ABX-stable CV status-continue VEST rx if ok w/ CTS  multifactorial dyspnea-resp failure-severe persistent asthma, TBM s/p tracheoplasty, s/p Aortic aneurysm repair, Bronchitis (proteus)-O2-keep 90%;TC  severe persistent asthma--medrol 8mg, singulair 10, duoneb q 6, budes .5 bid, tezspire 8/29-was due 9/29  TBM-s/p tracheoplasty--accapella, vest rx, mucomyst here 2 cc 10% q 8 (secretions)  s/p Aortic aneurysm repair--as per CTS staff care  AF-on eliquis rx               CV-improved cardene-MAP above 60  DVT R-IJ-on heparin rx  *****ID-s/p proteus bronchitis-s/p meropenum changed to ceftriaxone and back to meropenem/vanco- off of ABX as of 9/19--restart of ABX 9/27-meropenem/vanco-NOW on meropenem and vanco for MRSE sepsis (11/26 end date for vanco), plastics/ortho for elbow-proteus (?wash out needed/vac in place)  PT-OOB as able                             GI-TF as able--f/up LFTs       ****ORTHO f/up--? additional wash out of elbow wound?; wound care f/up  VC dysfunction--aspiration precautions-s/p trach 10/10  GOC                                    Heme onc f/up colon ca  prog--critical in CTU                PT-to continue-more OOB    Eulalio Allison MD-Pulmonary   [phone]

## 2022-10-31 NOTE — PROGRESS NOTE ADULT - SUBJECTIVE AND OBJECTIVE BOX
CHIEF COMPLAINT: f/up sob, chronic resp failure, TBM, severe persistent asthma, VC dysfunction, Type A aortic dissection s/p repair w/modified "Bentall procedure and hemiarch replacement 9/6-trached--on TC-some secretions but no pain or sob    Interval Events: wound vac in place    REVIEW OF SYSTEMS:  Constitutional: No fevers or chills. No weight loss. + fatigue or generalized malaise.  Eyes: No itching or discharge from the eyes  ENT: No ear pain. No ear discharge. No nasal congestion. No post nasal drip. No epistaxis. No throat pain. No sore throat. No difficulty swallowing.   CV: No chest pain. No palpitations. No lightheadedness or dizziness.   Resp: No dyspnea at rest. No dyspnea on exertion. No orthopnea. No wheezing. No cough. No stridor. + sputum production. No chest pain with respiration.  GI: No nausea. No vomiting. No diarrhea.  MSK: No joint pain or pain in any extremities  Integumentary: No skin lesions. No pedal edema.  Neurological: No gross motor weakness. No sensory changes.  [+ ] All other systems negative  [ ] Unable to assess ROS because ________    OBJECTIVE:  ICU Vital Signs Last 24 Hrs  T(C): 36 (31 Oct 2022 04:00), Max: 37.2 (30 Oct 2022 12:00)  T(F): 96.8 (31 Oct 2022 04:00), Max: 99 (30 Oct 2022 12:00)  HR: 78 (31 Oct 2022 04:00) (70 - 86)  BP: --  BP(mean): --  ABP: 120/49 (31 Oct 2022 04:00) (115/39 - 169/66)  ABP(mean): 72 (31 Oct 2022 04:00) (59 - 103)  RR: 22 (31 Oct 2022 04:00) (18 - 39)  SpO2: 100% (31 Oct 2022 04:00) (94% - 100%)    O2 Parameters below as of 31 Oct 2022 04:00  Patient On (Oxygen Delivery Method): tracheostomy collar  O2 Flow (L/min): 10  O2 Concentration (%): 40          10-29 @ 07:01  -  10-30 @ 07:00  --------------------------------------------------------  IN: 2930 mL / OUT: 1375 mL / NET: 1555 mL    10-30 @ 07:01  -  10-31 @ 05:37  --------------------------------------------------------  IN: 2740 mL / OUT: 1800 mL / NET: 940 mL      CAPILLARY BLOOD GLUCOSE  131 (30 Oct 2022 18:00)      POCT Blood Glucose.: 176 mg/dL (31 Oct 2022 00:27)      PHYSICAL EXAM: NAD in bed on TC  General: Awake, alert, oriented X 3.   HEENT: Atraumatic, normocephalic.                 Mallampatti Grade 3                No nasal congestion.                No tonsillar or pharyngeal exudates.  Lymph Nodes: No palpable lymphadenopathy  Neck: No JVD. No carotid bruit.   Respiratory: Nabnrmal chest expansion                         Normal percussion                         Normal and equal air entry                         No wheeze, rhonchi or rales.  Cardiovascular: S1 S2 normal. No murmurs, rubs or gallops.   Abdomen: Soft, non-tender, non-distended. No organomegaly. Normoactive bowel sounds.  Extremities: Warm to touch. Peripheral pulse palpable. No pedal edema.   Skin: No rashes or skin lesions  Neurological: Motor and sensory examination equal and normal in all four extremities.  Psychiatry: Appropriate mood and affect.    HOSPITAL MEDICATIONS:  MEDICATIONS  (STANDING):  acetylcysteine 10%  Inhalation 4 milliLiter(s) Inhalation every 12 hours  albuterol/ipratropium for Nebulization 3 milliLiter(s) Nebulizer every 6 hours  apixaban 5 milliGRAM(s) Oral every 12 hours  ascorbic acid 500 milliGRAM(s) Oral daily  buDESOnide    Inhalation Suspension 0.5 milliGRAM(s) Inhalation every 12 hours  chlorhexidine 2% Cloths 1 Application(s) Topical <User Schedule>  collagenase Ointment 1 Application(s) Topical daily  dextrose 50% Injectable 50 milliLiter(s) IV Push every 15 minutes  dorzolamide 2% Ophthalmic Solution 1 Drop(s) Left EYE three times a day  fluconAZOLE IVPB 600 milliGRAM(s) IV Intermittent every 24 hours  hydrALAZINE 50 milliGRAM(s) Oral every 8 hours  insulin lispro (ADMELOG) corrective regimen sliding scale   SubCutaneous every 6 hours  insulin NPH human recombinant 8 Unit(s) SubCutaneous <User Schedule>  insulin NPH human recombinant 30 Unit(s) SubCutaneous <User Schedule>  insulin NPH human recombinant 10 Unit(s) SubCutaneous <User Schedule>  insulin NPH human recombinant 16 Unit(s) SubCutaneous <User Schedule>  magnesium oxide 400 milliGRAM(s) Oral every 8 hours  meropenem  IVPB      meropenem  IVPB 1000 milliGRAM(s) IV Intermittent every 8 hours  methylPREDNISolone 8 milliGRAM(s) Oral daily  metoclopramide Injectable 5 milliGRAM(s) IV Push every 8 hours  metoprolol tartrate 12.5 milliGRAM(s) Enteral Tube every 12 hours  mexiletine 200 milliGRAM(s) Oral every 8 hours  multivitamin 1 Tablet(s) Oral daily  pantoprazole  Injectable 40 milliGRAM(s) IV Push daily  sodium chloride 0.9%. 1000 milliLiter(s) (10 mL/Hr) IV Continuous <Continuous>  timolol 0.5% Solution 1 Drop(s) Left EYE two times a day  vancomycin  IVPB 750 milliGRAM(s) IV Intermittent every 12 hours  vancomycin  IVPB        MEDICATIONS  (PRN):  acetaminophen    Suspension .. 650 milliGRAM(s) Oral every 6 hours PRN Temp greater or equal to 38C (100.4F), Mild Pain (1 - 3)      LABS:                        9.5    13.01 )-----------( 311      ( 31 Oct 2022 01:03 )             32.5     10-31    139  |  100  |  24<H>  ----------------------------<  171<H>  3.9   |  29  |  0.45<L>    Ca    9.4      31 Oct 2022 01:09  Phos  3.0     10-31  Mg     1.8     10-31    TPro  7.0  /  Alb  3.1<L>  /  TBili  0.3  /  DBili  x   /  AST  19  /  ALT  18  /  AlkPhos  128<H>  10-31        Arterial Blood Gas:  10-31 @ 00:15  7.42/50/107/32/98.8/6.9  ABG lactate: --  Arterial Blood Gas:  10-30 @ 00:35  7.41/50/163/32/99.0/6.1  ABG lactate: --  Arterial Blood Gas:  10-29 @ 16:30  7.42/40/169/26/98.5/1.3  ABG lactate: --        MICROBIOLOGY:     RADIOLOGY:  [ ] Reviewed and interpreted by me    Point of Care Ultrasound Findings:    PFT:    EKG:

## 2022-10-31 NOTE — PROGRESS NOTE ADULT - PROBLEM SELECTOR PLAN 3
Off rosuvastatin 5mg daily  Re start if not contraindicated and as per cardiology  -F/u levels as out pt.      discussed w/pt's daughter  Can be reached via TEAMS/pager: [phone]   office:  [phone] (M-F 9a-5pm)               [phone] (nights/weekends)   [email or web address] password [email or web address]

## 2022-10-31 NOTE — PROGRESS NOTE ADULT - SUBJECTIVE AND OBJECTIVE BOX
Patient seen and examined at the bedside.    Remained critically ill on continuous ICU monitoring.    Today:    OBJECTIVE:  Vital Signs Last 24 Hrs  T(C): 36.7 (31 Oct 2022 08:00), Max: 37.2 (30 Oct 2022 12:00)  T(F): 98.1 (31 Oct 2022 08:00), Max: 99 (30 Oct 2022 12:00)  HR: 70 (31 Oct 2022 08:00) (66 - 86)  BP: --  BP(mean): --  RR: 43 (31 Oct 2022 08:00) (18 - 43)  SpO2: 100% (31 Oct 2022 08:00) (96% - 100%)    Parameters below as of 31 Oct 2022 08:00  Patient On (Oxygen Delivery Method): tracheostomy collar  O2 Flow (L/min): 10  O2 Concentration (%): 40    Physical Exam:   General: Trach collar  Neurology: Nonfocal  ENT/Neck: + trach c/d/i, neck supple, trachea midline   Respiratory: coarse BS b/l, rhonchi throughout, minimal secretions  CV: S1S2, no murmurs        [x] Sinus Rhythm   Abdominal: Soft, NT, ND, colostomy in place (stoma intact)  Extremities: +4 RUE edema, 3+LUE edema, trace edema noted, + peripheral pulses   Skin: Dry dressing over right heel, R elbow wound w/ overlying cling dressing c/d/i                         Assessment:  71F PMH DM, COPD, chronic adrenal insufficiency on Prednisone, history of colorectal cancer s/p resection (colostomy bag), Hx of CAD, chronic AF on Eliquis, and tracheomalacia s/p multiple intubations, recent dx of R foot OM s/p debridement on antibiotics and presented to ED at [facility] this morning with epigastric pain, belching and central chest pain. Found on CTA to have type A aortic dissection and transferred to [facility] for surgical evaluation by Dr. Cabrera.     Ascending aortic dissection s/p type A dissection repair and Biobentall on 9/7   Respiratory failure s/p Trach 10/10/22  Hypovolemia   Post op respiratory failure   Acute blood loss anemia  Stress hyperglycemia   RIJ thrombus  MRSA PNA    Plan:   ***Neuro***  [x] Nonfocal  follows simple commands, continue to monitor  PT/OT progress as tolerated, ambulated with physical therapy    ***Cardiovascular***  Limited TTE on 10/1: EF 69%, Hyperdynamic left ventricular systolic function. There is hypokinesis of the mid-base inferior wall. Nml RV fxn.   CT chest on 9/28: No CT evidence of pulmonary embolism. Status post repair of ascending aortic dissection with a stable dissection flap originating from the aortic arch and extending into the infrarenal abdominal aorta,  B/l UE duplex on 10/5: As before, there is an occluded RIGHT IJ vein with thrombosis extending to brachiocephalic vein. Superficial thrombophlebitis of the LEFT cephalic vein.   Invasive hemodynamic monitoring, assess perfusion indices   SR / MAP 75 / Hct 32.5% / Lactate 1.6  Continuos reassessment of hemodynamics   Hydralazine PO for BP management   Rate control with Mexiletine and Lopressor   WVAC on rt elbow  changed 10/26  [x] AC Therapy with Eliquis 5 BID for Afib and IJ thrombus  Serial EKG and cardiac enzymes     ***Pulmonary***  Trach collar 10L/40% for 4 days,   Respiratory failure s/p Trach #8 portex  10/10/22, per ENT inner cannula needs to be changed daily, trach sutures d/c'ed by ENT 10/17 , 40% trach collar for 2 days  Titration of FiO2, follow SpO2, CXR, blood gases   Bronched 10/13  Continue duonebs  Suction q2hrly as needed   Aggressive Pulmonary toilet    ***GI***  [x] Diet: TFs Glucerna 1.2 @ goal 65ml/hr, tolerating without issues (when glucerna 1.5 back in stock will switch and decrease goal rate to 55ml/hr)  [x] Protonix    Reglan for gut motility     ***Renal***  Continue to monitor I/Os, BUN/Creatinine.   Replete lytes PRN    ***ID***  SCx on 10/4+Methicillin resistant Staphylococcus aureus, c/w IVPB Vancomycin, (plan for 6 week course in setting of valve surgery, 11/26 end date)  9/27 blood culture Neela Glabarata, on flucanazole (lifelong suppression)  BCx 10/13 NGTD, BC 10/21 and SCx NGTD  R elbow wound, S/p IR for elbow drainage 10/13 + Few Proteus mirabilis ESBL, Meropenem 7 day trial completed 10/19-> reordered 10/20 for reaccumulation of elbow bursitis-  10/22 plastics debrided R elbow eschar to subc tissue, ~8cc fluid aspirated and sent for culture. Wet to dry dressing changed 2x daily. PT changed vac on 10/28  Joint Fluid Cx 10/22 NTD  Meropenem for 8 day course (until 10/28),started in setting of elbow collection 10/22, f/u ID for possible shorter course given culture NTD 1/288 changed vac by physical therapy woud is healing    ***Endocrine***  [x]  DM : HbA1c 9.4%                - [x] ISS  [x] NPH              - Need tight glycemic control to prevent wound infection.  Endocrine following, appreciate recommendations      Patient requires continuous monitoring with bedside rhythm monitoring, pulse oximetry monitoring, and continuous central venous and arterial pressure monitoring; and intermittent blood gas analysis. Care plan discussed with the ICU care team.   Patient remained critical, at risk for life threatening decompensation.    I have spent 30 minutes providing critical care management to this patient.    By signing my name below, I, Bharti Barrios, attest that this documentation has been prepared under the direction and in the presence of KIANA Tejada.  Electronically signed: Georgi Gottlieb, 10-31-22 @ 09:09    I, Sarath Andres, personally performed the services described in this documentation. all medical record entries made by the scribe were at my direction and in my presence. I have reviewed the chart and agree that the record reflects my personal performance and is accurate and complete  Electronically signed: KIANA Tejada. Patient seen and examined at the bedside.    Remained critically ill on continuous ICU monitoring.    Today:  - Ambulated with PT  - Wound vac changed on right elbow  - Continue trach collar  - Tube feeds at goal, continue  - Continue antibiotics, will follow-up with ID about duration for each one    OBJECTIVE:  Vital Signs Last 24 Hrs  T(C): 36.7 (31 Oct 2022 08:00), Max: 37.2 (30 Oct 2022 12:00)  T(F): 98.1 (31 Oct 2022 08:00), Max: 99 (30 Oct 2022 12:00)  HR: 70 (31 Oct 2022 08:00) (66 - 86)  BP: --  BP(mean): --  RR: 43 (31 Oct 2022 08:00) (18 - 43)  SpO2: 100% (31 Oct 2022 08:00) (96% - 100%)    Parameters below as of 31 Oct 2022 08:00  Patient On (Oxygen Delivery Method): tracheostomy collar  O2 Flow (L/min): 10  O2 Concentration (%): 40    Physical Exam:   General: Trach collar  Neurology: Nonfocal, responds to commands all extremities  ENT/Neck: + trach c/d/i, neck supple, trachea midline   Respiratory: coarse BS b/l, rhonchi throughout, secretions present  CV: S1S2, no murmurs        [x] Sinus Rhythm   Abdominal: Soft, NT, ND, colostomy in place (stoma intact)  Extremities: nonpitting edema, improved. Right elbow wound vac c/d/i. warm and well-perfused.  Skin: Dry dressing over right heel. No cyanosis                    Assessment:  71F PMH DM, COPD, chronic adrenal insufficiency on Prednisone, history of colorectal cancer s/p resection (colostomy bag), Hx of CAD, chronic AF on Eliquis, and tracheomalacia s/p multiple intubations, recent dx of R foot OM s/p debridement on antibiotics and presented to ED at [facility] this morning with epigastric pain, belching and central chest pain. Found on CTA to have type A aortic dissection and transferred to [facility] for surgical evaluation by Dr. Cabrera.     Ascending aortic dissection s/p type A dissection repair and Biobentall on 9/7   Respiratory failure s/p Trach 10/10/22  Hypovolemia   Post op respiratory failure   Acute blood loss anemia  Stress hyperglycemia   RIJ thrombus  MRSA PNA    Plan:   ***Neuro***  [x] Nonfocal  follows simple commands, continue to monitor  PT/OT progress as tolerated, ambulated with physical therapy    ***Cardiovascular***  Limited TTE on 10/1: EF 69%, Hyperdynamic left ventricular systolic function. There is hypokinesis of the mid-base inferior wall. Nml RV fxn.   CT chest on 9/28: No CT evidence of pulmonary embolism. Status post repair of ascending aortic dissection with a stable dissection flap originating from the aortic arch and extending into the infrarenal abdominal aorta,  B/l UE duplex on 10/5: As before, there is an occluded RIGHT IJ vein with thrombosis extending to brachiocephalic vein. Superficial thrombophlebitis of the LEFT cephalic vein.   Invasive hemodynamic monitoring, assess perfusion indices   SR / MAP 75 / Hct 32.5% / Lactate 1.6  Continuos reassessment of hemodynamics   Hydralazine PO for BP management   Rate control with Mexiletine and Lopressor   WVAC on rt elbow  changed 10/26  [x] AC Therapy with Eliquis 5 BID for Afib and IJ thrombus  Serial EKG and cardiac enzymes     ***Pulmonary***  Trach collar 10L/40% since 10/25, continue  Respiratory failure s/p Trach #8 portex  10/10/22, per ENT inner cannula needs to be changed daily, trach sutures d/c'ed by ENT 10/17 , 40% trach collar for 2 days  Titration of FiO2, follow SpO2, CXR, blood gases   Bronched 10/13  Continue duonebs  Suction q2hrly as needed   Aggressive Pulmonary toilet    ***GI***  [x] Diet: TFs Glucerna 1.2 @ goal 65ml/hr, tolerating without issues (when glucerna 1.5 back in stock will switch and decrease goal rate to 55ml/hr)  [x] Protonix    Reglan for gut motility     ***Renal***  Continue to monitor I/Os, BUN/Creatinine.   Replete lytes PRN    ***ID***  SCx on 10/4+Methicillin resistant Staphylococcus aureus, c/w IVPB Vancomycin, (plan for 6 week course in setting of valve surgery, 11/26 end date)  9/27 blood culture Neela Glabarata, on flucanazole (lifelong suppression)  BCx 10/13 NGTD, BC 10/21 and SCx NGTD  R elbow wound, S/p IR for elbow drainage 10/13 + Few Proteus mirabilis ESBL, Meropenem 7 day trial completed 10/19-> reordered 10/20 for reaccumulation of elbow bursitis-  10/22 plastics debrided R elbow eschar to subc tissue, ~8cc fluid aspirated and sent for culture. Wet to dry dressing changed 2x daily. PT changed vac on 10/28  Joint Fluid Cx 10/22 NTD  Meropenem for 8 day course (until 10/28),started in setting of elbow collection 10/22, f/u ID for possible shorter course given culture NTD, changed vac by physical therapy wound is healing, last change 10/31    ***Endocrine***  [x]  DM : HbA1c 9.4%                - [x] ISS  [x] NPH              - Need tight glycemic control to prevent wound infection.  Endocrine following, appreciate recommendations      Patient requires continuous monitoring with bedside rhythm monitoring, pulse oximetry monitoring, and continuous central venous and arterial pressure monitoring; and intermittent blood gas analysis. Care plan discussed with the ICU care team.   Patient remained critical, at risk for life threatening decompensation.    I have spent 35 minutes providing critical care management to this patient.    By signing my name below, I, Bharti Barrios, attest that this documentation has been prepared under the direction and in the presence of KIANA Tejada.  Electronically signed: Georgi Gottlieb, 10-31-22 @ 09:09    I, Sarath Andres, personally performed the services described in this documentation. all medical record entries made by the scribe were at my direction and in my presence. I have reviewed the chart and agree that the record reflects my personal performance and is accurate and complete  Electronically signed: KIANA Tejada.

## 2022-10-31 NOTE — PROGRESS NOTE ADULT - SUBJECTIVE AND OBJECTIVE BOX
INFECTIOUS DISEASES FOLLOW UP-- Fouzia Stpehenach  [phone]    This is a follow up note for this  72yFemale with  Dissection of thoracic aorta  underwent right elbow wound vac change today        ROS:  CONSTITUTIONAL:  No fever,  interactive, able to write messages  CARDIOVASCULAR:  No chest pain or palpitations  RESPIRATORY:  No dyspnea  GASTROINTESTINAL:  No nausea, vomiting, diarrhea, or abdominal pain  GENITOURINARY:  No dysuria  NEUROLOGIC:  No headache,     Allergies    aspirin (Short breath)  Avelox (Short breath; Pruritus)  cefepime (Anaphylaxis)  codeine (Short breath)  Dilaudid (Short breath)  iodine (Short breath; Swelling)  penicillin (Anaphylaxis)  shellfish (Anaphylaxis)  tetanus toxoid (Short breath)  Valium (Short breath)    Intolerances        ANTIBIOTICS/RELEVANT:  antimicrobials  fluconAZOLE IVPB 600 milliGRAM(s) IV Intermittent every 24 hours  meropenem  IVPB 1000 milliGRAM(s) IV Intermittent every 8 hours  meropenem  IVPB      vancomycin  IVPB 750 milliGRAM(s) IV Intermittent every 12 hours  vancomycin  IVPB        immunologic:    OTHER:  acetaminophen    Suspension .. 650 milliGRAM(s) Oral every 6 hours PRN  acetylcysteine 10%  Inhalation 4 milliLiter(s) Inhalation every 12 hours  albuterol/ipratropium for Nebulization 3 milliLiter(s) Nebulizer every 6 hours  apixaban 5 milliGRAM(s) Oral every 12 hours  ascorbic acid 500 milliGRAM(s) Oral daily  buDESOnide    Inhalation Suspension 0.5 milliGRAM(s) Inhalation every 12 hours  chlorhexidine 2% Cloths 1 Application(s) Topical <User Schedule>  collagenase Ointment 1 Application(s) Topical daily  dextrose 50% Injectable 50 milliLiter(s) IV Push every 15 minutes  diphenhydramine 2%/zinc acetate 0.1% Cream 1 Application(s) Topical every 8 hours  dorzolamide 2% Ophthalmic Solution 1 Drop(s) Left EYE three times a day  hydrALAZINE 50 milliGRAM(s) Oral every 8 hours  insulin lispro (ADMELOG) corrective regimen sliding scale   SubCutaneous every 6 hours  insulin NPH human recombinant 10 Unit(s) SubCutaneous <User Schedule>  insulin NPH human recombinant 8 Unit(s) SubCutaneous <User Schedule>  magnesium oxide 400 milliGRAM(s) Oral every 8 hours  methylPREDNISolone 8 milliGRAM(s) Oral daily  metoclopramide Injectable 5 milliGRAM(s) IV Push every 8 hours  metoprolol tartrate 12.5 milliGRAM(s) Enteral Tube every 12 hours  mexiletine 200 milliGRAM(s) Oral every 8 hours  multivitamin 1 Tablet(s) Oral daily  pantoprazole  Injectable 40 milliGRAM(s) IV Push daily  sodium chloride 0.9%. 1000 milliLiter(s) IV Continuous <Continuous>  timolol 0.5% Solution 1 Drop(s) Left EYE two times a day      Objective:  Vital Signs Last 24 Hrs  T(C): 36.9 (31 Oct 2022 12:00), Max: 36.9 (31 Oct 2022 12:00)  T(F): 98.5 (31 Oct 2022 12:00), Max: 98.5 (31 Oct 2022 12:00)  HR: 80 (31 Oct 2022 17:20) (60 - 86)  BP: --  BP(mean): --  RR: 20 (31 Oct 2022 17:00) (19 - 39)  SpO2: 100% (31 Oct 2022 17:20) (96% - 100%)    Parameters below as of 31 Oct 2022 17:20  Patient On (Oxygen Delivery Method): tracheostomy collar        PHYSICAL EXAM:  Constitutional:no acute distress  Eyes:EBER, EOMI  Ear/Nose/Throat: no oral lesions, trach still with thick secretions	  Respiratory: audible bilateral  Cardiovascular: S1S2 sternotomy healing  Gastrointestinal:soft, (+) BS, no tenderness  Extremities:no e/e/c right elbow with wound vac  No Lymphadenopathy  IV sites not inflammed.    LABS:                        9.5    13.01 )-----------( 311      ( 31 Oct 2022 01:03 )             32.5     10-31    139  |  100  |  24<H>  ----------------------------<  171<H>  3.9   |  29  |  0.45<L>    Ca    9.4      31 Oct 2022 01:09  Phos  3.0     10-31  Mg     1.8     10-31    TPro  7.0  /  Alb  3.1<L>  /  TBili  0.3  /  DBili  x   /  AST  19  /  ALT  18  /  AlkPhos  128<H>  10-31          MICROBIOLOGY:      Urine Microscopic-Add On (NC) (10.25.22 @ 12:20)    Red Blood Cell - Urine: 1 /hpf    White Blood Cell - Urine: 1 /HPF    Hyaline Casts: 0 /LPF    Bacteria: Negative    Comment - Urine: much amorphous sediment seen    Epithelial Cells: 0 /hpf          RECENT CULTURES:      RADIOLOGY & ADDITIONAL STUDIES:    < from: Xray Chest 1 View- PORTABLE-Routine (Xray Chest 1 View- PORTABLE-Routine in AM.) (10.31.22 @ 04:38) >  FINDINGS:  Heart/Vascular: Heart size is within normal limits. Status post valve   replacement.  Pulmonary: No focal consolidation. No pleural effusion or pneumothorax.  Bones: Sternotomy.    Impression:  Clear lungs.    < end of copied text >

## 2022-10-31 NOTE — PROGRESS NOTE ADULT - ASSESSMENT

## 2022-10-31 NOTE — PROGRESS NOTE ADULT - PROBLEM SELECTOR PLAN 1
-test BG Q6h  -Adjust 6am NPH dose to 35 units. Decrease 12pm dose to 14 units and c/w NPH 8 units 6pm and 10 units at midnight. May need to further titrate down later day doses as 6am dose titrated up.   If BG <100 can administer 50% of the dose. HOLD IF TFS OFF  -c/w Admelog moderate correction scale Q6h  -notify endocrine team if any change to diet/TF regimen  Discharge plan:  - Likely to discharge patient on basal/bolus insulin. Final regimen pending clinical course.  - Recommend routine outpatient ophthalmology, podiatry  - Can f/u with endocrinologist Dr. Saavedra.  - Make sure pt has Rx for all DM supplies and insulin/ DM meds.  -Based on pt's clinical condition and mental status at this time she is not able to independently manage her DM. Will evaluate pt's ability to manage DM at time of discharge. If unable> pt will need family/ care giver (s) to manage DM care at home  -Will need rehab.

## 2022-10-31 NOTE — PROGRESS NOTE ADULT - PROBLEM SELECTOR PLAN 2
On chronic steroids at home: medrol 8mg qd   C/w Prednisone 8mg qd   Will need stress steroids if acutely ill.

## 2022-11-01 LAB
ALBUMIN SERPL ELPH-MCNC: 3.1 G/DL — LOW (ref 3.3–5)
ALP SERPL-CCNC: 121 U/L — HIGH (ref 40–120)
ALT FLD-CCNC: 19 U/L — SIGNIFICANT CHANGE UP (ref 10–45)
ANION GAP SERPL CALC-SCNC: 10 MMOL/L — SIGNIFICANT CHANGE UP (ref 5–17)
AST SERPL-CCNC: 19 U/L — SIGNIFICANT CHANGE UP (ref 10–40)
BILIRUB SERPL-MCNC: 0.2 MG/DL — SIGNIFICANT CHANGE UP (ref 0.2–1.2)
BUN SERPL-MCNC: 23 MG/DL — SIGNIFICANT CHANGE UP (ref 7–23)
CALCIUM SERPL-MCNC: 9.1 MG/DL — SIGNIFICANT CHANGE UP (ref 8.4–10.5)
CHLORIDE SERPL-SCNC: 99 MMOL/L — SIGNIFICANT CHANGE UP (ref 96–108)
CO2 SERPL-SCNC: 27 MMOL/L — SIGNIFICANT CHANGE UP (ref 22–31)
CREAT SERPL-MCNC: 0.46 MG/DL — LOW (ref 0.5–1.3)
EGFR: 100 ML/MIN/1.73M2 — SIGNIFICANT CHANGE UP
GAS PNL BLDA: SIGNIFICANT CHANGE UP
GLUCOSE BLDC GLUCOMTR-MCNC: 102 MG/DL — HIGH (ref 70–99)
GLUCOSE BLDC GLUCOMTR-MCNC: 131 MG/DL — HIGH (ref 70–99)
GLUCOSE BLDC GLUCOMTR-MCNC: 156 MG/DL — HIGH (ref 70–99)
GLUCOSE BLDC GLUCOMTR-MCNC: 287 MG/DL — HIGH (ref 70–99)
GLUCOSE BLDC GLUCOMTR-MCNC: 321 MG/DL — HIGH (ref 70–99)
GLUCOSE BLDC GLUCOMTR-MCNC: 66 MG/DL — LOW (ref 70–99)
GLUCOSE BLDC GLUCOMTR-MCNC: 67 MG/DL — LOW (ref 70–99)
GLUCOSE SERPL-MCNC: 184 MG/DL — HIGH (ref 70–99)
HCT VFR BLD CALC: 29.9 % — LOW (ref 34.5–45)
HGB BLD-MCNC: 9 G/DL — LOW (ref 11.5–15.5)
MAGNESIUM SERPL-MCNC: 2 MG/DL — SIGNIFICANT CHANGE UP (ref 1.6–2.6)
MCHC RBC-ENTMCNC: 23.2 PG — LOW (ref 27–34)
MCHC RBC-ENTMCNC: 30.1 GM/DL — LOW (ref 32–36)
MCV RBC AUTO: 77.1 FL — LOW (ref 80–100)
NRBC # BLD: 0 /100 WBCS — SIGNIFICANT CHANGE UP (ref 0–0)
PHOSPHATE SERPL-MCNC: 3.2 MG/DL — SIGNIFICANT CHANGE UP (ref 2.5–4.5)
PLATELET # BLD AUTO: 283 K/UL — SIGNIFICANT CHANGE UP (ref 150–400)
POTASSIUM SERPL-MCNC: 4.2 MMOL/L — SIGNIFICANT CHANGE UP (ref 3.5–5.3)
POTASSIUM SERPL-SCNC: 4.2 MMOL/L — SIGNIFICANT CHANGE UP (ref 3.5–5.3)
PROT SERPL-MCNC: 6.9 G/DL — SIGNIFICANT CHANGE UP (ref 6–8.3)
RBC # BLD: 3.88 M/UL — SIGNIFICANT CHANGE UP (ref 3.8–5.2)
RBC # FLD: 22 % — HIGH (ref 10.3–14.5)
SARS-COV-2 RNA SPEC QL NAA+PROBE: SIGNIFICANT CHANGE UP
SODIUM SERPL-SCNC: 136 MMOL/L — SIGNIFICANT CHANGE UP (ref 135–145)
VANCOMYCIN TROUGH SERPL-MCNC: 14.7 UG/ML — SIGNIFICANT CHANGE UP (ref 10–20)
WBC # BLD: 13.07 K/UL — HIGH (ref 3.8–10.5)
WBC # FLD AUTO: 13.07 K/UL — HIGH (ref 3.8–10.5)

## 2022-11-01 PROCEDURE — 71045 X-RAY EXAM CHEST 1 VIEW: CPT | Mod: 26

## 2022-11-01 PROCEDURE — 99232 SBSQ HOSP IP/OBS MODERATE 35: CPT

## 2022-11-01 PROCEDURE — 99291 CRITICAL CARE FIRST HOUR: CPT | Mod: 24

## 2022-11-01 RX ORDER — HUMAN INSULIN 100 [IU]/ML
8 INJECTION, SUSPENSION SUBCUTANEOUS
Refills: 0 | Status: DISCONTINUED | OUTPATIENT
Start: 2022-11-01 | End: 2022-11-11

## 2022-11-01 RX ORDER — HUMAN INSULIN 100 [IU]/ML
14 INJECTION, SUSPENSION SUBCUTANEOUS
Refills: 0 | Status: DISCONTINUED | OUTPATIENT
Start: 2022-11-01 | End: 2022-11-01

## 2022-11-01 RX ORDER — INSULIN HUMAN 100 [IU]/ML
3 INJECTION, SOLUTION SUBCUTANEOUS
Qty: 100 | Refills: 0 | Status: DISCONTINUED | OUTPATIENT
Start: 2022-11-01 | End: 2022-11-01

## 2022-11-01 RX ORDER — HUMAN INSULIN 100 [IU]/ML
15 INJECTION, SUSPENSION SUBCUTANEOUS
Refills: 0 | Status: DISCONTINUED | OUTPATIENT
Start: 2022-11-02 | End: 2022-11-02

## 2022-11-01 RX ORDER — MAGNESIUM SULFATE 500 MG/ML
2 VIAL (ML) INJECTION ONCE
Refills: 0 | Status: COMPLETED | OUTPATIENT
Start: 2022-11-01 | End: 2022-11-01

## 2022-11-01 RX ORDER — HUMAN INSULIN 100 [IU]/ML
40 INJECTION, SUSPENSION SUBCUTANEOUS
Refills: 0 | Status: DISCONTINUED | OUTPATIENT
Start: 2022-11-01 | End: 2022-11-02

## 2022-11-01 RX ORDER — HUMAN INSULIN 100 [IU]/ML
6 INJECTION, SUSPENSION SUBCUTANEOUS
Refills: 0 | Status: DISCONTINUED | OUTPATIENT
Start: 2022-11-01 | End: 2022-11-05

## 2022-11-01 RX ADMIN — Medication 1 TABLET(S): at 14:22

## 2022-11-01 RX ADMIN — Medication 250 MILLIGRAM(S): at 21:07

## 2022-11-01 RX ADMIN — APIXABAN 5 MILLIGRAM(S): 2.5 TABLET, FILM COATED ORAL at 06:04

## 2022-11-01 RX ADMIN — Medication 8: at 14:21

## 2022-11-01 RX ADMIN — Medication 250 MILLIGRAM(S): at 10:26

## 2022-11-01 RX ADMIN — Medication 3 MILLILITER(S): at 17:21

## 2022-11-01 RX ADMIN — Medication 25 GRAM(S): at 07:31

## 2022-11-01 RX ADMIN — HUMAN INSULIN 14 UNIT(S): 100 INJECTION, SUSPENSION SUBCUTANEOUS at 14:22

## 2022-11-01 RX ADMIN — DORZOLAMIDE HYDROCHLORIDE 1 DROP(S): 20 SOLUTION/ DROPS OPHTHALMIC at 14:24

## 2022-11-01 RX ADMIN — Medication 3 MILLILITER(S): at 06:07

## 2022-11-01 RX ADMIN — Medication 650 MILLIGRAM(S): at 21:18

## 2022-11-01 RX ADMIN — HUMAN INSULIN 10 UNIT(S): 100 INJECTION, SUSPENSION SUBCUTANEOUS at 00:39

## 2022-11-01 RX ADMIN — DORZOLAMIDE HYDROCHLORIDE 1 DROP(S): 20 SOLUTION/ DROPS OPHTHALMIC at 06:07

## 2022-11-01 RX ADMIN — HUMAN INSULIN 35 UNIT(S): 100 INJECTION, SUSPENSION SUBCUTANEOUS at 06:05

## 2022-11-01 RX ADMIN — Medication 50 MILLIGRAM(S): at 14:22

## 2022-11-01 RX ADMIN — Medication 0.5 MILLIGRAM(S): at 17:23

## 2022-11-01 RX ADMIN — Medication 50 MILLIGRAM(S): at 21:21

## 2022-11-01 RX ADMIN — Medication 5 MILLIGRAM(S): at 21:19

## 2022-11-01 RX ADMIN — Medication 1 APPLICATION(S): at 20:49

## 2022-11-01 RX ADMIN — MEROPENEM 100 MILLIGRAM(S): 1 INJECTION INTRAVENOUS at 14:23

## 2022-11-01 RX ADMIN — Medication 500 MILLIGRAM(S): at 14:22

## 2022-11-01 RX ADMIN — APIXABAN 5 MILLIGRAM(S): 2.5 TABLET, FILM COATED ORAL at 21:02

## 2022-11-01 RX ADMIN — MEROPENEM 100 MILLIGRAM(S): 1 INJECTION INTRAVENOUS at 06:04

## 2022-11-01 RX ADMIN — Medication 5 MILLIGRAM(S): at 06:05

## 2022-11-01 RX ADMIN — Medication 2: at 00:39

## 2022-11-01 RX ADMIN — MAGNESIUM OXIDE 400 MG ORAL TABLET 400 MILLIGRAM(S): 241.3 TABLET ORAL at 21:08

## 2022-11-01 RX ADMIN — Medication 12.5 MILLIGRAM(S): at 06:05

## 2022-11-01 RX ADMIN — FLUCONAZOLE 150 MILLIGRAM(S): 150 TABLET ORAL at 21:02

## 2022-11-01 RX ADMIN — Medication 1 APPLICATION(S): at 14:24

## 2022-11-01 RX ADMIN — DORZOLAMIDE HYDROCHLORIDE 1 DROP(S): 20 SOLUTION/ DROPS OPHTHALMIC at 20:50

## 2022-11-01 RX ADMIN — Medication 1 APPLICATION(S): at 06:06

## 2022-11-01 RX ADMIN — Medication 1 DROP(S): at 19:36

## 2022-11-01 RX ADMIN — Medication 650 MILLIGRAM(S): at 21:21

## 2022-11-01 RX ADMIN — MEROPENEM 100 MILLIGRAM(S): 1 INJECTION INTRAVENOUS at 21:19

## 2022-11-01 RX ADMIN — PANTOPRAZOLE SODIUM 40 MILLIGRAM(S): 20 TABLET, DELAYED RELEASE ORAL at 14:23

## 2022-11-01 RX ADMIN — MEXILETINE HYDROCHLORIDE 200 MILLIGRAM(S): 150 CAPSULE ORAL at 21:18

## 2022-11-01 RX ADMIN — MEXILETINE HYDROCHLORIDE 200 MILLIGRAM(S): 150 CAPSULE ORAL at 06:05

## 2022-11-01 RX ADMIN — Medication 4 MILLILITER(S): at 06:07

## 2022-11-01 RX ADMIN — MAGNESIUM OXIDE 400 MG ORAL TABLET 400 MILLIGRAM(S): 241.3 TABLET ORAL at 06:05

## 2022-11-01 RX ADMIN — Medication 4 MILLILITER(S): at 17:22

## 2022-11-01 RX ADMIN — Medication 1 APPLICATION(S): at 14:25

## 2022-11-01 RX ADMIN — Medication 50 MILLIGRAM(S): at 06:05

## 2022-11-01 RX ADMIN — CHLORHEXIDINE GLUCONATE 1 APPLICATION(S): 213 SOLUTION TOPICAL at 06:06

## 2022-11-01 RX ADMIN — Medication 3 MILLILITER(S): at 11:34

## 2022-11-01 RX ADMIN — MAGNESIUM OXIDE 400 MG ORAL TABLET 400 MILLIGRAM(S): 241.3 TABLET ORAL at 14:23

## 2022-11-01 RX ADMIN — Medication 0.5 MILLIGRAM(S): at 06:07

## 2022-11-01 RX ADMIN — Medication 3 MILLILITER(S): at 23:20

## 2022-11-01 RX ADMIN — Medication 1 DROP(S): at 06:07

## 2022-11-01 RX ADMIN — Medication 50 MILLILITER(S): at 20:34

## 2022-11-01 RX ADMIN — Medication 5 MILLIGRAM(S): at 14:23

## 2022-11-01 RX ADMIN — Medication 8 MILLIGRAM(S): at 06:04

## 2022-11-01 RX ADMIN — MEXILETINE HYDROCHLORIDE 200 MILLIGRAM(S): 150 CAPSULE ORAL at 14:23

## 2022-11-01 RX ADMIN — SODIUM CHLORIDE 10 MILLILITER(S): 9 INJECTION INTRAMUSCULAR; INTRAVENOUS; SUBCUTANEOUS at 21:21

## 2022-11-01 NOTE — PROGRESS NOTE ADULT - SUBJECTIVE AND OBJECTIVE BOX
seen earlier today     Chief Complaint: Type 2 Diabetes Mellitus     INTERVAL HX: pt c/o SOB this AM discussed with RN on unit RN aware, pulse ox 100%. BG at goal except for noon BG hyperglycemic 321       Review of Systems:  General: As above  Cardiovascular: No chest pain  Respiratory: + SOB  GI: No nausea, vomiting  Endocrine: no  S&Sx of hypoglycemia    Allergies    aspirin (Short breath)  Avelox (Short breath; Pruritus)  cefepime (Anaphylaxis)  codeine (Short breath)  Dilaudid (Short breath)  iodine (Short breath; Swelling)  penicillin (Anaphylaxis)  shellfish (Anaphylaxis)  tetanus toxoid (Short breath)  Valium (Short breath)    Intolerances      MEDICATIONS  (STANDING):  acetylcysteine 10%  Inhalation 4 milliLiter(s) Inhalation every 12 hours  albuterol/ipratropium for Nebulization 3 milliLiter(s) Nebulizer every 6 hours  apixaban 5 milliGRAM(s) Oral every 12 hours  ascorbic acid 500 milliGRAM(s) Oral daily  buDESOnide    Inhalation Suspension 0.5 milliGRAM(s) Inhalation every 12 hours  chlorhexidine 2% Cloths 1 Application(s) Topical <User Schedule>  collagenase Ointment 1 Application(s) Topical daily  dextrose 50% Injectable 50 milliLiter(s) IV Push every 15 minutes  diphenhydramine 2%/zinc acetate 0.1% Cream 1 Application(s) Topical every 8 hours  dorzolamide 2% Ophthalmic Solution 1 Drop(s) Left EYE three times a day  fluconAZOLE IVPB 600 milliGRAM(s) IV Intermittent every 24 hours  hydrALAZINE 50 milliGRAM(s) Oral every 8 hours  insulin lispro (ADMELOG) corrective regimen sliding scale   SubCutaneous every 6 hours  insulin NPH human recombinant 14 Unit(s) SubCutaneous <User Schedule>  insulin NPH human recombinant 35 Unit(s) SubCutaneous <User Schedule>  insulin NPH human recombinant 8 Unit(s) SubCutaneous <User Schedule>  insulin NPH human recombinant 10 Unit(s) SubCutaneous <User Schedule>  magnesium oxide 400 milliGRAM(s) Oral every 8 hours  meropenem  IVPB 1000 milliGRAM(s) IV Intermittent every 8 hours  meropenem  IVPB      methylPREDNISolone 8 milliGRAM(s) Oral daily  metoclopramide Injectable 5 milliGRAM(s) IV Push every 8 hours  mexiletine 200 milliGRAM(s) Oral every 8 hours  multivitamin 1 Tablet(s) Oral daily  pantoprazole  Injectable 40 milliGRAM(s) IV Push daily  sodium chloride 0.9%. 1000 milliLiter(s) (10 mL/Hr) IV Continuous <Continuous>  timolol 0.5% Solution 1 Drop(s) Left EYE two times a day  vancomycin  IVPB 750 milliGRAM(s) IV Intermittent every 12 hours  vancomycin  IVPB            insulin lispro (ADMELOG) corrective regimen sliding scale   8 Unit(s) SubCutaneous (11-01-22 @ 14:21)   2  SubCutaneous (11-01-22 @ 00:39)    insulin NPH human recombinant   14 Unit(s) SubCutaneous (11-01-22 @ 14:22)    insulin NPH human recombinant   35 Unit(s) SubCutaneous (11-01-22 @ 06:05)    insulin NPH human recombinant   8 Unit(s) SubCutaneous (10-31-22 @ 18:47)    insulin NPH human recombinant   10 Unit(s) SubCutaneous (11-01-22 @ 00:39)    methylPREDNISolone   8 milliGRAM(s) Oral (11-01-22 @ 06:04)        PHYSICAL EXAM:  VITALS: T(C): 36.6 (11-01-22 @ 08:00)  T(F): 97.9 (11-01-22 @ 08:00), Max: 98.3 (10-31-22 @ 20:00)  HR: 52 (11-01-22 @ 11:35) (49 - 85)  BP: --  RR:  (18 - 42)  SpO2:  (92% - 100%)  Wt(kg): --  GENERAL: female sitting in chair in NAD, NGT glucerna running at goal   Respiratory: Respirations unlabored  , trach   Extremities: Warm, no edema  NEURO: Alert , appropriate       LABS:    POCT Blood Glucose.: 321 mg/dL (11-01-22 @ 14:12)  POCT Blood Glucose.: 102 mg/dL (11-01-22 @ 06:01)  POCT Blood Glucose.: 143 mg/dL (10-31-22 @ 18:21)  POCT Blood Glucose.: 113 mg/dL (10-31-22 @ 16:03)  POCT Blood Glucose.: 273 mg/dL (10-31-22 @ 12:35)  POCT Blood Glucose.: 144 mg/dL (10-31-22 @ 06:50)  POCT Blood Glucose.: 176 mg/dL (10-31-22 @ 00:27)  POCT Blood Glucose.: 131 mg/dL (10-30-22 @ 18:14)  POCT Blood Glucose.: 288 mg/dL (10-30-22 @ 11:38)  POCT Blood Glucose.: 300 mg/dL (10-30-22 @ 11:33)  POCT Blood Glucose.: 149 mg/dL (10-30-22 @ 05:34)  POCT Blood Glucose.: 145 mg/dL (10-30-22 @ 00:40)  POCT Blood Glucose.: 149 mg/dL (10-29-22 @ 17:15)                            9.0    13.07 )-----------( 283      ( 01 Nov 2022 00:29 )             29.9       11-01    136  |  99  |  23  ----------------------------<  184<H>  4.2   |  27  |  0.46<L>    Ca    9.1      01 Nov 2022 00:28  Phos  3.2     11-01  Mg     2.0     11-01    TPro  6.9  /  Alb  3.1<L>  /  TBili  0.2  /  DBili  x   /  AST  19  /  ALT  19  /  AlkPhos  121<H>  11-01      eGFR: 100 mL/min/1.73m2 (01 Nov 2022 00:28)          Thyroid Function Tests:  10-03 @ 04:47 TSH 0.32 FreeT4 -- T3 -- Anti TPO -- Anti Thyroglobulin Ab -- TSI --          A1C with Estimated Average Glucose Result: 9.4 % (09-06-22 @ 16:46)      Estimated Average Glucose: 223 mg/dL (09-06-22 @ 16:46)

## 2022-11-01 NOTE — PROGRESS NOTE ADULT - SUBJECTIVE AND OBJECTIVE BOX
Patient seen and examined at the bedside.    Remained critically ill on continuous ICU monitoring.    OBJECTIVE:  Vital Signs Last 24 Hrs  T(C): 36.2 (01 Nov 2022 04:00), Max: 36.9 (31 Oct 2022 12:00)  T(F): 97.2 (01 Nov 2022 04:00), Max: 98.5 (31 Oct 2022 12:00)  HR: 67 (01 Nov 2022 07:00) (60 - 86)  BP: --  BP(mean): --  RR: 29 (01 Nov 2022 07:00) (20 - 42)  SpO2: 100% (01 Nov 2022 07:00) (98% - 100%)    Parameters below as of 01 Nov 2022 06:07  Patient On (Oxygen Delivery Method): tracheostomy collar    Physical Exam:   General: Trach collar  Neurology: Nonfocal, responds to commands all extremities  ENT/Neck: + trach c/d/i, neck supple, trachea midline   Respiratory: coarse BS b/l, rhonchi throughout, secretions present  CV: S1S2, no murmurs        [x] Sinus Rhythm   Abdominal: Soft, NT, ND, colostomy in place (stoma intact)  Extremities: nonpitting edema, improved. Right elbow wound vac c/d/i. warm and well-perfused.  Skin: Dry dressing over right heel. No cyanosis               Assessment:  71F PMH DM, COPD, chronic adrenal insufficiency on Prednisone, history of colorectal cancer s/p resection (colostomy bag), Hx of CAD, chronic AF on Eliquis, and tracheomalacia s/p multiple intubations, recent dx of R foot OM s/p debridement on antibiotics and presented to ED at [facility] this morning with epigastric pain, belching and central chest pain. Found on CTA to have type A aortic dissection and transferred to [facility] for surgical evaluation by Dr. Cabrera.     Ascending aortic dissection s/p type A dissection repair and Biobentall on 9/7   Respiratory failure s/p Trach 10/10/22  Hypovolemia   Post op respiratory failure   Acute blood loss anemia  Lactate acidosis   Stress hyperglycemia   RIJ thrombus  MRSA PNA    Plan:   ***Neuro***  [x] Nonfocal  follows simple commands, continue to monitor  PT/OT progress as tolerated, ambulated with physical therapy    ***Cardiovascular***  Limited TTE on 10/1: EF 69%, Hyperdynamic left ventricular systolic function. There is hypokinesis of the mid-base inferior wall. Nml RV fxn.   CT chest on 9/28: No CT evidence of pulmonary embolism. Status post repair of ascending aortic dissection with a stable dissection flap originating from the aortic arch and extending into the infrarenal abdominal aorta,  B/l UE duplex on 10/5: As before, there is an occluded RIGHT IJ vein with thrombosis extending to brachiocephalic vein. Superficial thrombophlebitis of the LEFT cephalic vein.   Invasive hemodynamic monitoring, assess perfusion indices   SR / MAP 71 / Hct 29.9% / Lactate 2.2  Continuos reassessment of hemodynamics   Hydralazine PO for BP management   Rate control with Mexiletine and Lopressor   WVAC on rt elbow  changed 10/26  [x] AC Therapy with Eliquis 5 BID for Afib and IJ thrombus  Serial EKG and cardiac enzymes     ***Pulmonary***  Trach collar 10L/40% since 10/25, continue  Respiratory failure s/p Trach #8 portex  10/10/22, per ENT inner cannula needs to be changed daily, trach sutures d/c'ed by ENT 10/17 , 40% trach collar for 2 days  Titration of FiO2, follow SpO2, CXR, blood gases   Bronched 10/13  Continue duonebs  Suction q2hrly as needed   Aggressive Pulmonary toilet    ***GI***  [x] Diet: TFs Glucerna 1.2 @ goal 65ml/hr, tolerating without issues (when glucerna 1.5 back in stock will switch and decrease goal rate to 55ml/hr)  [x] Protonix    Reglan for gut motility     ***Renal***  Continue to monitor I/Os, BUN/Creatinine.   Replete lytes PRN    ***ID***  SCx on 10/4+Methicillin resistant Staphylococcus aureus, c/w IVPB Vancomycin, (plan for 6 week course in setting of valve surgery, 11/26 end date)  9/27 blood culture Neela Glabarata, on flucanazole (lifelong suppression)  BCx 10/13 NGTD, BC 10/21 and SCx NGTD  R elbow wound, S/p IR for elbow drainage 10/13 + Few Proteus mirabilis ESBL, Meropenem 7 day trial completed 10/19-> reordered 10/20 for reaccumulation of elbow bursitis-  10/22 plastics debrided R elbow eschar to subc tissue, ~8cc fluid aspirated and sent for culture. Wet to dry dressing changed 2x daily. PT changed vac on 10/28  Joint Fluid Cx 10/22 NTD  Meropenem for 8 day course (until 10/28),started in setting of elbow collection 10/22, f/u ID for possible shorter course given culture NTD, changed vac by physical therapy wound is healing, last change 10/31    ***Endocrine***  [x]  DM : HbA1c 9.4%                - [x] ISS  [x] NPH              - Need tight glycemic control to prevent wound infection.  Endocrine following, appreciate recommendations          Patient requires continuous monitoring with bedside rhythm monitoring, pulse oximetry monitoring, and continuous central venous and arterial pressure monitoring; and intermittent blood gas analysis. Care plan discussed with the ICU care team.   Patient remained critical, at risk for life threatening decompensation.    I have spent 30 minutes providing critical care management to this patient.    By signing my name below, I, Bharti Barrios, attest that this documentation has been prepared under the direction and in the presence of KIANA Tejada.  Electronically signed: Georgi Gottlieb, 11-01-22 @ 07:38    I, Sarath Andres, personally performed the services described in this documentation. all medical record entries made by the scribe were at my direction and in my presence. I have reviewed the chart and agree that the record reflects my personal performance and is accurate and complete  Electronically signed: KIANA Tejada. Patient seen and examined at the bedside.    Remained critically ill on continuous ICU monitoring.    Today:    OBJECTIVE:  Vital Signs Last 24 Hrs  T(C): 36.2 (01 Nov 2022 04:00), Max: 36.9 (31 Oct 2022 12:00)  T(F): 97.2 (01 Nov 2022 04:00), Max: 98.5 (31 Oct 2022 12:00)  HR: 67 (01 Nov 2022 07:00) (60 - 86)  BP: --  BP(mean): --  RR: 29 (01 Nov 2022 07:00) (20 - 42)  SpO2: 100% (01 Nov 2022 07:00) (98% - 100%)    Parameters below as of 01 Nov 2022 06:07  Patient On (Oxygen Delivery Method): tracheostomy collar    Physical Exam:   General: Trach collar  Neurology: Nonfocal, responds to commands all extremities  ENT/Neck: + trach c/d/i, neck supple, trachea midline   Respiratory: coarse BS b/l, rhonchi throughout, secretions present  CV: S1S2, no murmurs        [x] Sinus Rhythm   Abdominal: Soft, NT, ND, colostomy in place (stoma intact)  Extremities: nonpitting edema, improved. Right elbow wound vac c/d/i. warm and well-perfused.  Skin: Dry dressing over right heel. No cyanosis               Assessment:  71F PMH DM, COPD, chronic adrenal insufficiency on Prednisone, history of colorectal cancer s/p resection (colostomy bag), Hx of CAD, chronic AF on Eliquis, and tracheomalacia s/p multiple intubations, recent dx of R foot OM s/p debridement on antibiotics and presented to ED at [facility] this morning with epigastric pain, belching and central chest pain. Found on CTA to have type A aortic dissection and transferred to [facility] for surgical evaluation by Dr. Cabrera.     Ascending aortic dissection s/p type A dissection repair and Biobentall on 9/7   Respiratory failure s/p Trach 10/10/22  Hypovolemia   Post op respiratory failure   Acute blood loss anemia  Lactate acidosis   Stress hyperglycemia   RIJ thrombus  MRSA PNA    Plan:   ***Neuro***  [x] Nonfocal  follows simple commands, continue to monitor  PT/OT progress as tolerated, ambulated with physical therapy    ***Cardiovascular***  Limited TTE on 10/1: EF 69%, Hyperdynamic left ventricular systolic function. There is hypokinesis of the mid-base inferior wall. Nml RV fxn.   CT chest on 9/28: No CT evidence of pulmonary embolism. Status post repair of ascending aortic dissection with a stable dissection flap originating from the aortic arch and extending into the infrarenal abdominal aorta,  B/l UE duplex on 10/5: As before, there is an occluded RIGHT IJ vein with thrombosis extending to brachiocephalic vein. Superficial thrombophlebitis of the LEFT cephalic vein.   Invasive hemodynamic monitoring, assess perfusion indices   SR / MAP 71 / Hct 29.9% / Lactate 2.2  Continuos reassessment of hemodynamics   Hydralazine PO for BP management   Rate control with Mexiletine and Lopressor   WVAC on rt elbow  changed 10/26  [x] AC Therapy with Eliquis 5 BID for Afib and IJ thrombus  Serial EKG and cardiac enzymes     ***Pulmonary***  Trach collar 10L/40% since 10/25, continue  Respiratory failure s/p Trach #8 portex  10/10/22, per ENT inner cannula needs to be changed daily, trach sutures d/c'ed by ENT 10/17 , 40% trach collar for 2 days  Titration of FiO2, follow SpO2, CXR, blood gases   Bronched 10/13  Continue duonebs  Suction q2hrly as needed   Aggressive Pulmonary toilet    ***GI***  [x] Diet: TFs Glucerna 1.2 @ goal 65ml/hr, tolerating without issues (when glucerna 1.5 back in stock will switch and decrease goal rate to 55ml/hr)  [x] Protonix    Reglan for gut motility     ***Renal***  Continue to monitor I/Os, BUN/Creatinine.   Replete lytes PRN    ***ID***  SCx on 10/4+Methicillin resistant Staphylococcus aureus, c/w IVPB Vancomycin, (plan for 6 week course in setting of valve surgery, 11/26 end date)  9/27 blood culture Neela Glabarata, on flucanazole (lifelong suppression)  BCx 10/13 NGTD, BC 10/21 and SCx NGTD  R elbow wound, S/p IR for elbow drainage 10/13 + Few Proteus mirabilis ESBL, Meropenem 7 day trial completed 10/19-> reordered 10/20 for reaccumulation of elbow bursitis-  10/22 plastics debrided R elbow eschar to subc tissue, ~8cc fluid aspirated and sent for culture. Wet to dry dressing changed 2x daily. PT changed vac on 10/28  Joint Fluid Cx 10/22 NTD  Meropenem for 8 day course (until 10/28),started in setting of elbow collection 10/22, f/u ID for possible shorter course given culture NTD, changed vac by physical therapy wound is healing, last change 10/31    ***Endocrine***  [x]  DM : HbA1c 9.4%                - [x] ISS  [x] NPH              - Need tight glycemic control to prevent wound infection.  Endocrine following, appreciate recommendations          Patient requires continuous monitoring with bedside rhythm monitoring, pulse oximetry monitoring, and continuous central venous and arterial pressure monitoring; and intermittent blood gas analysis. Care plan discussed with the ICU care team.   Patient remained critical, at risk for life threatening decompensation.    I have spent 30 minutes providing critical care management to this patient.    By signing my name below, I, Bharti Barrios, attest that this documentation has been prepared under the direction and in the presence of KIANA Tejada.  Electronically signed: Georgi Gottlieb, 11-01-22 @ 07:38    I, Sarath Andres, personally performed the services described in this documentation. all medical record entries made by the scribe were at my direction and in my presence. I have reviewed the chart and agree that the record reflects my personal performance and is accurate and complete  Electronically signed: KIANA Tejada. Patient seen and examined at the bedside.    Remained critically ill on continuous ICU monitoring.    Today:  - Continue trach collar  - OOB to chair  - Tube feeds at goal, continue  - Continue antibiotics    OBJECTIVE:  Vital Signs Last 24 Hrs  T(C): 36.2 (01 Nov 2022 04:00), Max: 36.9 (31 Oct 2022 12:00)  T(F): 97.2 (01 Nov 2022 04:00), Max: 98.5 (31 Oct 2022 12:00)  HR: 67 (01 Nov 2022 07:00) (60 - 86)  BP: --  BP(mean): --  RR: 29 (01 Nov 2022 07:00) (20 - 42)  SpO2: 100% (01 Nov 2022 07:00) (98% - 100%)    Parameters below as of 01 Nov 2022 06:07  Patient On (Oxygen Delivery Method): tracheostomy collar    Physical Exam:   General: Trach collar  Neurology: Nonfocal, responds to commands all extremities  ENT/Neck: + trach c/d/i, neck supple, trachea midline   Respiratory: coarse BS b/l, rhonchi throughout, secretions present  CV: S1S2, no murmurs        [x] Sinus Rhythm   Abdominal: Soft, NT, ND, colostomy in place (stoma intact)  Extremities: nonpitting edema, improved. Right elbow wound vac c/d/i. warm and well-perfused.  Skin: Dry dressing over right heel. No cyanosis               Assessment:  71F PMH DM, COPD, chronic adrenal insufficiency on Prednisone, history of colorectal cancer s/p resection (colostomy bag), Hx of CAD, chronic AF on Eliquis, and tracheomalacia s/p multiple intubations, recent dx of R foot OM s/p debridement on antibiotics and presented to ED at [facility] this morning with epigastric pain, belching and central chest pain. Found on CTA to have type A aortic dissection and transferred to [facility] for surgical evaluation by Dr. Cabrera.     Ascending aortic dissection s/p type A dissection repair and Biobentall on 9/7   Respiratory failure s/p Trach 10/10/22  Hypovolemia   Post op respiratory failure   Acute blood loss anemia  Lactate acidosis   Stress hyperglycemia   RIJ thrombus  MRSA PNA    Plan:   ***Neuro***  [x] Nonfocal  follows simple commands, continue to monitor  PT/OT progress as tolerated, ambulated with physical therapy    ***Cardiovascular***  Limited TTE on 10/1: EF 69%, Hyperdynamic left ventricular systolic function. There is hypokinesis of the mid-base inferior wall. Nml RV fxn.   CT chest on 9/28: No CT evidence of pulmonary embolism. Status post repair of ascending aortic dissection with a stable dissection flap originating from the aortic arch and extending into the infrarenal abdominal aorta,  B/l UE duplex on 10/5: As before, there is an occluded RIGHT IJ vein with thrombosis extending to brachiocephalic vein. Superficial thrombophlebitis of the LEFT cephalic vein.   Invasive hemodynamic monitoring, assess perfusion indices   SR / MAP 71 / Hct 29.9% / Lactate 2.2  Continuos reassessment of hemodynamics   Hydralazine PO for BP management   Rate control with Mexiletine and Lopressor   WVAC on rt elbow  changed 10/26  [x] AC Therapy with Eliquis 5 BID for Afib and IJ thrombus  Serial EKG and cardiac enzymes     ***Pulmonary***  Trach collar 10L/40% since 10/25, continue  Respiratory failure s/p Trach #8 portex  10/10/22, per ENT inner cannula needs to be changed daily, trach sutures d/c'ed by ENT 10/17 , 40% trach collar for 2 days  Titration of FiO2, follow SpO2, CXR, blood gases   Bronched 10/13  Continue duonebs  Suction q2hrly as needed   Aggressive Pulmonary toilet    ***GI***  [x] Diet: TFs Glucerna 1.2 @ goal 65ml/hr, tolerating without issues (when glucerna 1.5 back in stock will switch and decrease goal rate to 55ml/hr)  [x] Protonix    Reglan for gut motility     ***Renal***  Continue to monitor I/Os, BUN/Creatinine.   Replete lytes PRN    ***ID***  SCx on 10/4+Methicillin resistant Staphylococcus aureus, c/w IVPB Vancomycin, (plan for 6 week course in setting of valve surgery, 11/26 end date)  9/27 blood culture Neela Glabarata, on flucanazole (lifelong suppression)  BCx 10/13 NGTD, BC 10/21 and SCx NGTD  R elbow wound, S/p IR for elbow drainage 10/13 + Few Proteus mirabilis ESBL, Meropenem 7 day trial completed 10/19-> reordered 10/20 for reaccumulation of elbow bursitis-  10/22 plastics debrided R elbow eschar to subc tissue, ~8cc fluid aspirated and sent for culture. Wet to dry dressing changed 2x daily. PT changed vac on 10/28  Joint Fluid Cx 10/22 NTD  Meropenem for 8 day course (until 10/28),started in setting of elbow collection 10/22, f/u ID for possible shorter course given culture NTD, changed vac by physical therapy wound is healing, last change 10/31    ***Endocrine***  [x]  DM : HbA1c 9.4%                - [x] ISS  [x] NPH              - Need tight glycemic control to prevent wound infection.  Endocrine following, appreciate recommendations          Patient requires continuous monitoring with bedside rhythm monitoring, pulse oximetry monitoring, and continuous central venous and arterial pressure monitoring; and intermittent blood gas analysis. Care plan discussed with the ICU care team.   Patient remained critical, at risk for life threatening decompensation.    I have spent 30 minutes providing critical care management to this patient.    By signing my name below, I, Bharti Barrios, attest that this documentation has been prepared under the direction and in the presence of KIANA Tejada.  Electronically signed: Georgi Gottlieb, 11-01-22 @ 07:38    I, Sarath Andres, personally performed the services described in this documentation. all medical record entries made by the scribe were at my direction and in my presence. I have reviewed the chart and agree that the record reflects my personal performance and is accurate and complete  Electronically signed: KIANA Tejada.

## 2022-11-01 NOTE — PROGRESS NOTE ADULT - TIME BILLING
as above: ID f/up-resp stable--wound care f/up-TC continues- (she will always have secretions) due to TBM-s/p  trach 10/10-s/p  resp failure-s/p [id number]-tc ABX-stable CV status-continue VEST rx if ok w/ CTS  multifactorial dyspnea-resp failure-severe persistent asthma, TBM s/p tracheoplasty, s/p Aortic aneurysm repair, Bronchitis (proteus)-O2-keep 90%;TC  severe persistent asthma--medrol 8mg, singulair 10, duoneb q 6, budes .5 bid, tezspire 8/29-was due 9/29  TBM-s/p tracheoplasty--accapella, vest rx, mucomyst here 2 cc 10% q 8 (secretions)  s/p Aortic aneurysm repair--as per CTS staff care  AF-on eliquis rx               CV-improved cardene-MAP above 60  DVT R-IJ-on heparin rx  *****ID-s/p proteus bronchitis-s/p meropenum changed to ceftriaxone and back to meropenem/vanco- off of ABX as of 9/19--restart of ABX 9/27-meropenem/vanco-NOW on meropenem and vanco for MRSE sepsis (11/15 end date for vanco), plastics/ortho for elbow-proteus (?wash out)-vac in place-suppressive rx-bact/fluconazole  PT-OOB as able                             GI-TF as able--f/up LFTs       ****ORTHO f/up--? additional wash out of elbow wound?; wound care f/up  VC dysfunction--aspiration precautions-s/p trach 10/10  GOC                                    Heme onc f/up colon ca  prog--critical in CTU                PT-to continue-more OOB     DC planning    Eulalio Allison MD-Pulmonary   [phone]

## 2022-11-01 NOTE — PROGRESS NOTE ADULT - ASSESSMENT
72 F w/h/o uncontrolled T2DM (A1C 9.4%) on basal/bolus insulin PTA. Unknown DM complications. Also COPD, secondary adrenal Insufficiency on chronic steroids, colorectal cancer s/p resection (colostomy bag), Chronic A fib on Eliquis, and tracheomalacia and multiple intubations, recent dx of OM presented to ED at [facility] with epigastric pain, belching and central chest pain. Found on CTA to have type A aortic dissection and transferred to [facility] for surgical evaluation by Dr. Cabrera. Now s/p aortic dissection repair on 9/07/22. Endocrine consulted for assistance with uncontrolled DM/steroids for AI. Pt in ICU with ventricular dysfunction/sepsis, and now s/p trach 10/10 and more recently s/p R elbow eschar debridement 10/22 > Wound VAC 10/24.  On Prednisone dose 8mg for AI. Tolerating TF @ goal rate w/hyperglycemia around noon daily  due to steroid action. BG goal (100-180mg/dl).        Type 2 diabetes mellitus with hyperglycemia.   - BG Goal 100-180mg/dl    -test BG q6h if NPO/TF   - continue to optimize NPH regimen  Adjust NPH regimen to:      00:00 NPH 8 units      06:00 NPH 40 units      12:00 NPH 14 units       18:00 NPH 6units  If BG <100 can administer 50% of the dose. HOLD IF TFS OFF  -C/w Admelog moderate correction scale q6h for now  - May need to further titrate down later day doses as 6am dose titrated up.   -c/w Admelog moderate correction scale Q6h  -notify endocrine team if any change to diet/TF regimen  Discharge plan:  - Likely to discharge patient on basal/bolus insulin. Final regimen pending clinical course.  - Recommend routine outpatient ophthalmology, podiatry  - Can f/u with endocrinologist Dr. Saavedra.  - Make sure pt has Rx for all DM supplies and insulin/ DM meds.  -Based on pt's clinical condition and mental status at this time she is not able to independently manage her DM. Will evaluate pt's ability to manage DM at time of discharge. If unable> pt will need family/ care giver (s) to manage DM care at home       Problem/Plan - 2:  ·  Problem: Adrenal insufficiency.   ·  Plan: On chronic steroids at home: medrol 8mg qd   C/w Prednisone 8mg qd   Will need stress steroids if acutely ill.     Problem/Plan - 3:  ·  Problem: Hyperlipidemia.   ·  Plan: Off rosuvastatin 5mg daily  Re start if not contraindicated and as per cardiology  -F/u levels as out pt.    discussed with pt and RN   Contact via [employer] Teams during business hours  On evenings and weekends (or if no response on [employer] Teams) please call [phone] or page endocrine fellow on call.   Email: [email or web address]   Please note that this patient may be followed by different provider tomorrow.    greater than 50% of the encounter was spent counseling and/or coordination of care.  26 minutes spent on total encounter; The necessity of the time spent during the encounter on this date of service was due to development of plan of care/coordination of care/glycemic control through review of labs, blood glucose values and vital signs.

## 2022-11-01 NOTE — PROGRESS NOTE ADULT - SUBJECTIVE AND OBJECTIVE BOX
CHIEF COMPLAINT: f/up sob, chronic resp failure, TBM, severe persistent asthma, VC dysfunction, Type A aortic dissection s/p repair w/modified "Bentall procedure and hemiarch replacement 9/6-trached--on TC-feeling well, good sleep, some OOB    Interval Events: ID f/up ABX through 11/5 then suppressive rx bactrim/fluconazole    REVIEW OF SYSTEMS:  Constitutional: No fevers or chills. No weight loss. + fatigue or generalized malaise.  Eyes: No itching or discharge from the eyes  ENT: No ear pain. No ear discharge. No nasal congestion. No post nasal drip. No epistaxis. No throat pain. No sore throat. No difficulty swallowing.   CV: No chest pain. No palpitations. No lightheadedness or dizziness.   Resp: No dyspnea at rest. No dyspnea on exertion. No orthopnea. No wheezing. + cough. No stridor. No sputum production. No chest pain with respiration.  GI: No nausea. No vomiting. No diarrhea.  MSK: No joint pain or pain in any extremities  Integumentary: No skin lesions. No pedal edema.  Neurological: No gross motor weakness. No sensory changes.  [+ ] All other systems negative  [ ] Unable to assess ROS because ________    OBJECTIVE:  ICU Vital Signs Last 24 Hrs  T(C): 36.4 (01 Nov 2022 00:00), Max: 36.9 (31 Oct 2022 12:00)  T(F): 97.5 (01 Nov 2022 00:00), Max: 98.5 (31 Oct 2022 12:00)  HR: 73 (01 Nov 2022 04:00) (60 - 86)  BP: --  BP(mean): --  ABP: 160/57 (01 Nov 2022 04:00) (106/44 - 164/72)  ABP(mean): 90 (01 Nov 2022 04:00) (60 - 103)  RR: 32 (01 Nov 2022 04:00) (20 - 42)  SpO2: 100% (01 Nov 2022 04:00) (98% - 100%)    O2 Parameters below as of 01 Nov 2022 04:00  Patient On (Oxygen Delivery Method): tracheostomy collar  O2 Flow (L/min): 10  O2 Concentration (%): 40          10-30 @ 07:01  -  10-31 @ 07:00  --------------------------------------------------------  IN: 3130 mL / OUT: 2550 mL / NET: 580 mL    10-31 @ 07:01 - 11-01 @ 05:42  --------------------------------------------------------  IN: 2530 mL / OUT: 10 mL / NET: 2520 mL      CAPILLARY BLOOD GLUCOSE  131 (30 Oct 2022 18:00)      POCT Blood Glucose.: 143 mg/dL (31 Oct 2022 18:21)      PHYSICAL EXAM: NAD on TC  General: Awake, alert, oriented X 3.   HEENT: Atraumatic, normocephalic.                 Mallampatti Grade 2                No nasal congestion.                No tonsillar or pharyngeal exudates.  Lymph Nodes: No palpable lymphadenopathy  Neck: No JVD. No carotid bruit.   Respiratory: abnormal chest expansion                         Normal percussion                         Normal and equal air entry                         No wheeze, rhonchi or rales.  Cardiovascular: S1 S2 normal. No murmurs, rubs or gallops.   Abdomen: Soft, non-tender, non-distended. No organomegaly. Normoactive bowel sounds.  Extremities: Warm to touch. Peripheral pulse palpable. No pedal edema. R-elbow vac   Skin: No rashes or skin lesions  Neurological: Motor and sensory examination equal and normal in all four extremities.  Psychiatry: Appropriate mood and affect.    HOSPITAL MEDICATIONS:  MEDICATIONS  (STANDING):  acetylcysteine 10%  Inhalation 4 milliLiter(s) Inhalation every 12 hours  albuterol/ipratropium for Nebulization 3 milliLiter(s) Nebulizer every 6 hours  apixaban 5 milliGRAM(s) Oral every 12 hours  ascorbic acid 500 milliGRAM(s) Oral daily  buDESOnide    Inhalation Suspension 0.5 milliGRAM(s) Inhalation every 12 hours  chlorhexidine 2% Cloths 1 Application(s) Topical <User Schedule>  collagenase Ointment 1 Application(s) Topical daily  dextrose 50% Injectable 50 milliLiter(s) IV Push every 15 minutes  diphenhydramine 2%/zinc acetate 0.1% Cream 1 Application(s) Topical every 8 hours  dorzolamide 2% Ophthalmic Solution 1 Drop(s) Left EYE three times a day  fluconAZOLE IVPB 600 milliGRAM(s) IV Intermittent every 24 hours  hydrALAZINE 50 milliGRAM(s) Oral every 8 hours  insulin lispro (ADMELOG) corrective regimen sliding scale   SubCutaneous every 6 hours  insulin NPH human recombinant 14 Unit(s) SubCutaneous <User Schedule>  insulin NPH human recombinant 35 Unit(s) SubCutaneous <User Schedule>  insulin NPH human recombinant 8 Unit(s) SubCutaneous <User Schedule>  insulin NPH human recombinant 10 Unit(s) SubCutaneous <User Schedule>  magnesium oxide 400 milliGRAM(s) Oral every 8 hours  meropenem  IVPB      meropenem  IVPB 1000 milliGRAM(s) IV Intermittent every 8 hours  methylPREDNISolone 8 milliGRAM(s) Oral daily  metoclopramide Injectable 5 milliGRAM(s) IV Push every 8 hours  metoprolol tartrate 12.5 milliGRAM(s) Enteral Tube every 12 hours  mexiletine 200 milliGRAM(s) Oral every 8 hours  multivitamin 1 Tablet(s) Oral daily  pantoprazole  Injectable 40 milliGRAM(s) IV Push daily  sodium chloride 0.9%. 1000 milliLiter(s) (10 mL/Hr) IV Continuous <Continuous>  timolol 0.5% Solution 1 Drop(s) Left EYE two times a day  vancomycin  IVPB 750 milliGRAM(s) IV Intermittent every 12 hours  vancomycin  IVPB        MEDICATIONS  (PRN):  acetaminophen    Suspension .. 650 milliGRAM(s) Oral every 6 hours PRN Temp greater or equal to 38C (100.4F), Mild Pain (1 - 3)      LABS:                        9.0    13.07 )-----------( 283      ( 01 Nov 2022 00:29 )             29.9     11-01    136  |  99  |  23  ----------------------------<  184<H>  4.2   |  27  |  0.46<L>    Ca    9.1      01 Nov 2022 00:28  Phos  3.2     11-01  Mg     2.0     11-01    TPro  6.9  /  Alb  3.1<L>  /  TBili  0.2  /  DBili  x   /  AST  19  /  ALT  19  /  AlkPhos  121<H>  11-01        Arterial Blood Gas:  11-01 @ 00:12  7.39/48/114/29/99.1/3.6  ABG lactate: --  Arterial Blood Gas:  10-31 @ 00:15  7.42/50/107/32/98.8/6.9  ABG lactate: --        MICROBIOLOGY:     RADIOLOGY:  [ ] Reviewed and interpreted by me    Point of Care Ultrasound Findings:    PFT:    EKG:

## 2022-11-01 NOTE — PROGRESS NOTE ADULT - ASSESSMENT

## 2022-11-01 NOTE — CHART NOTE - NSCHARTNOTEFT_GEN_A_CORE
Nutrition Follow Up Note  Patient seen for: nutrition follow up.    Chart reviewed, events noted. Pt is a 71F w/h/o uncontrolled T2DM (A1C 9.4%) on basal/bolus insulin PTA. Unknown DM complications. Also COPD, secondary adrenal Insufficiency on chronic steroids, colorectal cancer s/p resection (colostomy bag), Chronic A fib on Eliquis, and tracheomalacia and multiple intubations, recent dx of OM presented to ED at [facility] with epigastric pain, belching and central chest pain. Found on CTA to have type A aortic dissection and transferred to [facility] for surgical evaluation by Dr. Cabrera. Now s/p Type A aortic dissection repair.     Source: [] Patient       [x] EMR        [x] RN        [] Family at bedside       [] Other:    -If unable to interview patient: [x] Trach/Vent/BiPAP  [] Disoriented/confused/inappropriate to interview    Diet, NPO with Tube Feed:   Tube Feeding Modality: Nasogastric  Glucerna 1.2 [place] (GLUCERNARTH)  Total Volume for 24 Hours (mL): 1560  Continuous  Starting Tube Feed Rate {mL per Hour}: 65  Until Goal Tube Feed Rate (mL per Hour): 65  Tube Feed Duration (in Hours): 24  Tube Feed Start Time: 08:20  No Carb Prosource TF     Qty per Day:  1 (10-25-22 @ 15:15)    EN Order Provides: 1560ml, 1912kcal and 105g protein, 1256mL free water. Based on UBW (62.3kg): 30.6kcal/kg and ~1.7g protein/kg.   Current Pump Rate: 65mL/hr  EN provision per flow sheets:    - 5 Day Average: 1541mL or 99% goal volume     Nutrition-related concerns:   - Pt currently tolerating trach collar.   - NPH + SSI for BG management. Endocrinology following for adjustments in NPH as needed. Pt continues on Medrol for adrenal insufficiency.   - IVF: NS @ 10mL/hr    GI: Colostomy output: 50mL (), 400mL (10/31).   Bowel Regimen? [] Yes   [x] No   - Antibiotic regimen noted    Weights:   Daily Weight in k.7 (10-), Weight in k.4 (10-30), Weight in k.5 (10-29), Weight in k.2 (10-28), Weight in k.7 (10-27), Weight in k (10-26), Weight in k.6 (10-25), Weight in k.9 (10-24), Weight in k.1 (10-23), Weight in k.2 (10-21), Weight in k.9 (10-20), Weight in k.1 (10-19), Weight in k.5 (10-18), Weight in k.2 (10-17), Weight in k.4 (10-16), Weight in k.5 (10-15), Weight in k.9 (10-14)   - weight fluctuations noted, will continue to monitor    MEDICATIONS  (STANDING):  acetylcysteine 10%  Inhalation 4 milliLiter(s) Inhalation every 12 hours  albuterol/ipratropium for Nebulization 3 milliLiter(s) Nebulizer every 6 hours  apixaban 5 milliGRAM(s) Oral every 12 hours  ascorbic acid 500 milliGRAM(s) Oral daily  buDESOnide    Inhalation Suspension 0.5 milliGRAM(s) Inhalation every 12 hours  chlorhexidine 2% Cloths 1 Application(s) Topical <User Schedule>  collagenase Ointment 1 Application(s) Topical daily  dextrose 50% Injectable 50 milliLiter(s) IV Push every 15 minutes  diphenhydramine 2%/zinc acetate 0.1% Cream 1 Application(s) Topical every 8 hours  dorzolamide 2% Ophthalmic Solution 1 Drop(s) Left EYE three times a day  fluconAZOLE IVPB 600 milliGRAM(s) IV Intermittent every 24 hours  hydrALAZINE 50 milliGRAM(s) Oral every 8 hours  insulin lispro (ADMELOG) corrective regimen sliding scale   SubCutaneous every 6 hours  insulin NPH human recombinant 10 Unit(s) SubCutaneous <User Schedule>  insulin NPH human recombinant 14 Unit(s) SubCutaneous <User Schedule>  insulin NPH human recombinant 35 Unit(s) SubCutaneous <User Schedule>  insulin NPH human recombinant 8 Unit(s) SubCutaneous <User Schedule>  magnesium oxide 400 milliGRAM(s) Oral every 8 hours  meropenem  IVPB      meropenem  IVPB 1000 milliGRAM(s) IV Intermittent every 8 hours  methylPREDNISolone 8 milliGRAM(s) Oral daily  metoclopramide Injectable 5 milliGRAM(s) IV Push every 8 hours  metoprolol tartrate 12.5 milliGRAM(s) Enteral Tube every 12 hours  mexiletine 200 milliGRAM(s) Oral every 8 hours  multivitamin 1 Tablet(s) Oral daily  pantoprazole  Injectable 40 milliGRAM(s) IV Push daily  sodium chloride 0.9%. 1000 milliLiter(s) (10 mL/Hr) IV Continuous <Continuous>  timolol 0.5% Solution 1 Drop(s) Left EYE two times a day  vancomycin  IVPB 750 milliGRAM(s) IV Intermittent every 12 hours  vancomycin  IVPB        MEDICATIONS  (PRN):  acetaminophen    Suspension .. 650 milliGRAM(s) Oral every 6 hours PRN Temp greater or equal to 38C (100.4F), Mild Pain (1 - 3)    Pertinent Labs:  @ 00:28: Na 136, BUN 23, Cr 0.46<L>, <H>, K+ 4.2, Phos 3.2, Mg 2.0, Alk Phos 121<H>, ALT/SGPT 19, AST/SGOT 19, HbA1c --    A1C with Estimated Average Glucose Result: 9.4 % (22 @ 16:46)  A1C with Estimated Average Glucose Result: 9.2 % (22 @ 07:36)  A1C with Estimated Average Glucose Result: 8.0 % (05-10-22 @ 12:26)    Finger Sticks: CAPILLARY BLOOD GLUCOSE  POCT Blood Glucose.: 102 mg/dL (2022 06:01)  POCT Blood Glucose.: 143 mg/dL (31 Oct 2022 18:21)  POCT Blood Glucose.: 113 mg/dL (31 Oct 2022 16:03)  POCT Blood Glucose.: 273 mg/dL (31 Oct 2022 12:35)    Skin per nursing documentation: DTI bilateral buttocks, right elbow wound (per wound care note)  Edema: No noted edema as per flow sheets.     Based on UBW 62.3kg  Estimated Energy Needs: 1682 - 1994kcal (27 - 32kcal/kg)   Estimated Protein Needs: 87 - 112g (1.4 - 1.8g/kg)  Estimated Fluid Needs: per team.    Previous Nutrition Diagnosis: Moderate Acute Malnutrition   Nutrition Diagnosis is: [x] ongoing  [] resolved [] not applicable     New Nutrition Diagnosis: n/a    Nutrition Care Plan:  [x] In Progress - being addressed with EN   [] Achieved  [] Not applicable    Nutrition Interventions:     Education Provided:       [] Yes:  [x] No: N/A    Recommendations:  1. Continue Glucerna 1.2 at current goal rate 65mL/hr x 24 hr + No Carb Prosource TF x 1. To provide: 1560ml, 1912kcal and 105g protein, 1256mL free water. Based on UBW (62.3kg): 30.6kcal/kg and ~1.7g protein/kg. Defer additional free water flushes to team.  2. Monitor GI tolerance. RD to remain available to adjust EN formulary, volume/rate PRN.   3. Antihyperglycemic regimen deferred to team.   4. Continue multivitamin and vitamin C to support wound healing.    Monitoring and Evaluation:   Continue to monitor nutritional intake, tolerance to diet prescription, weights, labs, skin integrity    RD remains available upon request and will follow up per protocol    Deedee Rosas MS, RD, CDN, [facility] Pager #[phone]

## 2022-11-02 ENCOUNTER — APPOINTMENT (OUTPATIENT)
Dept: PULMONOLOGY | Facility: CLINIC | Age: 72
End: 2022-11-02

## 2022-11-02 DIAGNOSIS — R49.0 DYSPHONIA: ICD-10-CM

## 2022-11-02 LAB
ALBUMIN SERPL ELPH-MCNC: 3 G/DL — LOW (ref 3.3–5)
ALP SERPL-CCNC: 119 U/L — SIGNIFICANT CHANGE UP (ref 40–120)
ALT FLD-CCNC: 16 U/L — SIGNIFICANT CHANGE UP (ref 10–45)
ANION GAP SERPL CALC-SCNC: 9 MMOL/L — SIGNIFICANT CHANGE UP (ref 5–17)
AST SERPL-CCNC: 17 U/L — SIGNIFICANT CHANGE UP (ref 10–40)
BILIRUB SERPL-MCNC: 0.3 MG/DL — SIGNIFICANT CHANGE UP (ref 0.2–1.2)
BLD GP AB SCN SERPL QL: NEGATIVE — SIGNIFICANT CHANGE UP
BUN SERPL-MCNC: 22 MG/DL — SIGNIFICANT CHANGE UP (ref 7–23)
CALCIUM SERPL-MCNC: 9 MG/DL — SIGNIFICANT CHANGE UP (ref 8.4–10.5)
CHLORIDE SERPL-SCNC: 101 MMOL/L — SIGNIFICANT CHANGE UP (ref 96–108)
CO2 SERPL-SCNC: 26 MMOL/L — SIGNIFICANT CHANGE UP (ref 22–31)
CREAT SERPL-MCNC: 0.44 MG/DL — LOW (ref 0.5–1.3)
EGFR: 101 ML/MIN/1.73M2 — SIGNIFICANT CHANGE UP
GAS PNL BLDA: SIGNIFICANT CHANGE UP
GLUCOSE BLDC GLUCOMTR-MCNC: 169 MG/DL — HIGH (ref 70–99)
GLUCOSE BLDC GLUCOMTR-MCNC: 207 MG/DL — HIGH (ref 70–99)
GLUCOSE BLDC GLUCOMTR-MCNC: 252 MG/DL — HIGH (ref 70–99)
GLUCOSE BLDC GLUCOMTR-MCNC: 84 MG/DL — SIGNIFICANT CHANGE UP (ref 70–99)
GLUCOSE SERPL-MCNC: 118 MG/DL — HIGH (ref 70–99)
HCT VFR BLD CALC: 31.3 % — LOW (ref 34.5–45)
HGB BLD-MCNC: 9.4 G/DL — LOW (ref 11.5–15.5)
MAGNESIUM SERPL-MCNC: 2 MG/DL — SIGNIFICANT CHANGE UP (ref 1.6–2.6)
MCHC RBC-ENTMCNC: 23.5 PG — LOW (ref 27–34)
MCHC RBC-ENTMCNC: 30 GM/DL — LOW (ref 32–36)
MCV RBC AUTO: 78.3 FL — LOW (ref 80–100)
NRBC # BLD: 0 /100 WBCS — SIGNIFICANT CHANGE UP (ref 0–0)
PHOSPHATE SERPL-MCNC: 3.4 MG/DL — SIGNIFICANT CHANGE UP (ref 2.5–4.5)
PLATELET # BLD AUTO: 308 K/UL — SIGNIFICANT CHANGE UP (ref 150–400)
POTASSIUM SERPL-MCNC: 4.2 MMOL/L — SIGNIFICANT CHANGE UP (ref 3.5–5.3)
POTASSIUM SERPL-SCNC: 4.2 MMOL/L — SIGNIFICANT CHANGE UP (ref 3.5–5.3)
PROT SERPL-MCNC: 6.7 G/DL — SIGNIFICANT CHANGE UP (ref 6–8.3)
RBC # BLD: 4 M/UL — SIGNIFICANT CHANGE UP (ref 3.8–5.2)
RBC # FLD: 21.8 % — HIGH (ref 10.3–14.5)
RH IG SCN BLD-IMP: POSITIVE — SIGNIFICANT CHANGE UP
SODIUM SERPL-SCNC: 136 MMOL/L — SIGNIFICANT CHANGE UP (ref 135–145)
VANCOMYCIN TROUGH SERPL-MCNC: 11.2 UG/ML — SIGNIFICANT CHANGE UP (ref 10–20)
WBC # BLD: 13.15 K/UL — HIGH (ref 3.8–10.5)
WBC # FLD AUTO: 13.15 K/UL — HIGH (ref 3.8–10.5)

## 2022-11-02 PROCEDURE — 31615 TRCHEOBRNCHSC EST TRACHS INC: CPT

## 2022-11-02 PROCEDURE — 99232 SBSQ HOSP IP/OBS MODERATE 35: CPT | Mod: FS,25

## 2022-11-02 PROCEDURE — 71045 X-RAY EXAM CHEST 1 VIEW: CPT | Mod: 26

## 2022-11-02 PROCEDURE — 99232 SBSQ HOSP IP/OBS MODERATE 35: CPT

## 2022-11-02 PROCEDURE — 99291 CRITICAL CARE FIRST HOUR: CPT | Mod: 24

## 2022-11-02 PROCEDURE — 31575 DIAGNOSTIC LARYNGOSCOPY: CPT | Mod: 59

## 2022-11-02 RX ORDER — DEXTROSE 50 % IN WATER 50 %
50 SYRINGE (ML) INTRAVENOUS
Refills: 0 | Status: COMPLETED | OUTPATIENT
Start: 2022-11-02 | End: 2022-11-01

## 2022-11-02 RX ORDER — HUMAN INSULIN 100 [IU]/ML
10 INJECTION, SUSPENSION SUBCUTANEOUS
Refills: 0 | Status: DISCONTINUED | OUTPATIENT
Start: 2022-11-02 | End: 2022-11-03

## 2022-11-02 RX ORDER — HUMAN INSULIN 100 [IU]/ML
42 INJECTION, SUSPENSION SUBCUTANEOUS
Refills: 0 | Status: DISCONTINUED | OUTPATIENT
Start: 2022-11-03 | End: 2022-11-03

## 2022-11-02 RX ADMIN — Medication 1 APPLICATION(S): at 14:38

## 2022-11-02 RX ADMIN — Medication 5 MILLIGRAM(S): at 07:02

## 2022-11-02 RX ADMIN — PANTOPRAZOLE SODIUM 40 MILLIGRAM(S): 20 TABLET, DELAYED RELEASE ORAL at 12:20

## 2022-11-02 RX ADMIN — Medication 3 MILLILITER(S): at 17:38

## 2022-11-02 RX ADMIN — FLUCONAZOLE 150 MILLIGRAM(S): 150 TABLET ORAL at 21:06

## 2022-11-02 RX ADMIN — DORZOLAMIDE HYDROCHLORIDE 1 DROP(S): 20 SOLUTION/ DROPS OPHTHALMIC at 14:22

## 2022-11-02 RX ADMIN — Medication 50 MILLIGRAM(S): at 14:24

## 2022-11-02 RX ADMIN — Medication 1 TABLET(S): at 14:20

## 2022-11-02 RX ADMIN — Medication 50 MILLIGRAM(S): at 22:14

## 2022-11-02 RX ADMIN — Medication 5 MILLIGRAM(S): at 21:43

## 2022-11-02 RX ADMIN — MEXILETINE HYDROCHLORIDE 200 MILLIGRAM(S): 150 CAPSULE ORAL at 07:02

## 2022-11-02 RX ADMIN — Medication 1 DROP(S): at 18:45

## 2022-11-02 RX ADMIN — HUMAN INSULIN 40 UNIT(S): 100 INJECTION, SUSPENSION SUBCUTANEOUS at 08:47

## 2022-11-02 RX ADMIN — MEXILETINE HYDROCHLORIDE 200 MILLIGRAM(S): 150 CAPSULE ORAL at 14:20

## 2022-11-02 RX ADMIN — Medication 1 DROP(S): at 07:02

## 2022-11-02 RX ADMIN — HUMAN INSULIN 10 UNIT(S): 100 INJECTION, SUSPENSION SUBCUTANEOUS at 12:42

## 2022-11-02 RX ADMIN — Medication 1 APPLICATION(S): at 12:19

## 2022-11-02 RX ADMIN — Medication 4 MILLILITER(S): at 06:01

## 2022-11-02 RX ADMIN — Medication 1 APPLICATION(S): at 22:02

## 2022-11-02 RX ADMIN — CHLORHEXIDINE GLUCONATE 1 APPLICATION(S): 213 SOLUTION TOPICAL at 07:00

## 2022-11-02 RX ADMIN — APIXABAN 5 MILLIGRAM(S): 2.5 TABLET, FILM COATED ORAL at 07:00

## 2022-11-02 RX ADMIN — Medication 3 MILLILITER(S): at 23:11

## 2022-11-02 RX ADMIN — DORZOLAMIDE HYDROCHLORIDE 1 DROP(S): 20 SOLUTION/ DROPS OPHTHALMIC at 21:45

## 2022-11-02 RX ADMIN — MAGNESIUM OXIDE 400 MG ORAL TABLET 400 MILLIGRAM(S): 241.3 TABLET ORAL at 07:02

## 2022-11-02 RX ADMIN — Medication 8 MILLIGRAM(S): at 07:02

## 2022-11-02 RX ADMIN — DORZOLAMIDE HYDROCHLORIDE 1 DROP(S): 20 SOLUTION/ DROPS OPHTHALMIC at 07:00

## 2022-11-02 RX ADMIN — MEXILETINE HYDROCHLORIDE 200 MILLIGRAM(S): 150 CAPSULE ORAL at 21:44

## 2022-11-02 RX ADMIN — MAGNESIUM OXIDE 400 MG ORAL TABLET 400 MILLIGRAM(S): 241.3 TABLET ORAL at 21:45

## 2022-11-02 RX ADMIN — Medication 0.5 MILLIGRAM(S): at 06:02

## 2022-11-02 RX ADMIN — Medication 4: at 12:42

## 2022-11-02 RX ADMIN — Medication 1 APPLICATION(S): at 07:00

## 2022-11-02 RX ADMIN — MEROPENEM 100 MILLIGRAM(S): 1 INJECTION INTRAVENOUS at 07:02

## 2022-11-02 RX ADMIN — HUMAN INSULIN 6 UNIT(S): 100 INJECTION, SUSPENSION SUBCUTANEOUS at 18:45

## 2022-11-02 RX ADMIN — HUMAN INSULIN 8 UNIT(S): 100 INJECTION, SUSPENSION SUBCUTANEOUS at 01:23

## 2022-11-02 RX ADMIN — Medication 3 MILLILITER(S): at 11:13

## 2022-11-02 RX ADMIN — Medication 0.5 MILLIGRAM(S): at 17:38

## 2022-11-02 RX ADMIN — MAGNESIUM OXIDE 400 MG ORAL TABLET 400 MILLIGRAM(S): 241.3 TABLET ORAL at 14:20

## 2022-11-02 RX ADMIN — Medication 250 MILLIGRAM(S): at 22:16

## 2022-11-02 RX ADMIN — Medication 4 MILLILITER(S): at 17:39

## 2022-11-02 RX ADMIN — APIXABAN 5 MILLIGRAM(S): 2.5 TABLET, FILM COATED ORAL at 18:45

## 2022-11-02 RX ADMIN — Medication 250 MILLIGRAM(S): at 10:50

## 2022-11-02 RX ADMIN — Medication 2: at 07:00

## 2022-11-02 RX ADMIN — Medication 500 MILLIGRAM(S): at 12:21

## 2022-11-02 RX ADMIN — Medication 5 MILLIGRAM(S): at 14:21

## 2022-11-02 RX ADMIN — Medication 50 MILLIGRAM(S): at 07:00

## 2022-11-02 RX ADMIN — Medication 3 MILLILITER(S): at 06:02

## 2022-11-02 NOTE — PROGRESS NOTE ADULT - PROBLEM SELECTOR PLAN 1
-test BG q6h if NPO/TF   -Adjust NPH to 8-42-10-6 units q6h for now. If BG <100 can administer 50% of the dose. HOLD IF TFS OFF  -C/w Admelog moderate correction scale q6h for now  Discharge plan:  - Likely to discharge patient on basal/bolus insulin. Final regimen pending clinical course.  - Recommend routine outpatient ophthalmology, podiatry  - Can f/u with endocrinologist Dr. Saavedra.  - Make sure pt has Rx for all DM supplies and insulin/ DM meds.  -Based on pt's clinical condition and mental status at this time she is not able to independently manage her DM. Will evaluate pt's ability to manage DM at time of discharge. If unable> pt will need family/ care giver (s) to manage DM care at home  -Will need rehab. -test BG q6h if NPO/TF   -Adjust NPH to 8-42-10-6 units q6h for now. If BG <100 can administer 50% of the dose. HOLD IF TFS OFF  -If TFs off after NPH is given please start D5 infusion at TF rate to prevent rebound hypoglycemia.   -C/w Admelog moderate correction scale q6h for now  Discharge plan:  - Likely to discharge patient on basal/bolus insulin. Final regimen pending clinical course.  - Recommend routine outpatient ophthalmology, podiatry  - Can f/u with endocrinologist Dr. Saavedra.  - Make sure pt has Rx for all DM supplies and insulin/ DM meds.  -Based on pt's clinical condition and mental status at this time she is not able to independently manage her DM. Will evaluate pt's ability to manage DM at time of discharge. If unable> pt will need family/ care giver (s) to manage DM care at home  -Will need rehab.

## 2022-11-02 NOTE — SPEAKING VALVE EVALUATION - H & P REVIEW
71F PMH DM, COPD, Chronic Adrenal Insufficiency on Chronic prednisone, history of colorectal cancer s/p resection (colostomy bag), Hx of CAD, Chronic A fib on Eliquis, and tracheomalacia s/p tracheoplasty, and multiple intubations, recent dx of OM presented to ED at [facility] this morning with epigastric pain, belching and central chest pain. Found on CTA to have type A aortic dissection and transferred to [facility] for surgical evaluation by Dr. Cabrera. Pt admitted for ascending aortic dissection. Patient went to OR for emergency type A aortic dissection repair with Dr. Cabrera. Extubated post-op on 9/7. On 9/8 pt developed increasingly labored breathing and due to mild delirium and depressed mental status post operatively, patient was reintubated. Extubated 9/13. Passed FEES 9/20 and started on a regular diet/thin liquids, NGT remained in place w/ TF. Intubated 9/28 s/p emesis episode on nocturnal NGT feeds, s/p 10/11 trachestomy. 10/13 weaning to trach collar trials, tolerating TC overnight since 10/25.

## 2022-11-02 NOTE — PROGRESS NOTE ADULT - SUBJECTIVE AND OBJECTIVE BOX
ENT ISSUE/POD: s/p trach now with hoarseness s/p PMV valve     HPI: 71F PMH h/o COPD, tracheomalacia s/p Tracheobronchoplasty (Prolene Mesh- 2016), and multiple intubations. Found on CTA to have type A aortic dissection s/p repair, multiple intubations throughout hospital course, s/p aspiration event 9/28 subsequently intubated and trach placed 10/11 by us (ENT). Seen by speech for passy-yair speaking valve evaluation. Pt on trach collar. Per speech vocal quality hoarse with good volume. Pt tolerated valve. ENT reconsulted for upper airway evaluation.         PAST MEDICAL & SURGICAL HISTORY:  Atrial fibrillation  paroxysmal, on eliquis      Diabetes  Type 2      COPD (chronic obstructive pulmonary disease)      Adrenal insufficiency  Medrol daily for over 50 years      Aortic insufficiency  moderate AR on echo 5/3/2018      Pelvic fracture      Asthma      Tracheobronchomalacia  diagnosed 2015, s/p bronchial thermoplasty 2016 (Dr Zapien); recent bronchoscopy 6/5/2018 revealed no evidence of tracheobronchomalacia in trachea or bronchial tubes      Colorectal cancer  4/2018- last treatment , chemo and radiation      Rectal bleeding      Seizure  x 1 1/7/18      DVT (deep venous thrombosis)  15-20 years ago, took coumadin      TIA (transient ischemic attack)  multiple, last 5 years ago - presents as right-sided weakness      History of partial hysterectomy  30 years ago - fibroids      H/O total knee replacement, bilateral  5 years ago      History of sinus surgery  multiple sinus surgeries      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      H/O pelvic surgery  5 years ago - s/p fracture      History of tracheomalacia  2015 - attempted tracheal stenting ([facility])- course complicated by obstruction, respiratory failure, multiple CPR attempts -  stent discontinued; 10/20/2016 Tracheobronchoplasty (Prolene Mesh) performed at [facility] by Dr Zapien      S/P bronchoscopy  6/5/2018 - [place] (Dr Zapien) no evidence of tracheobronchomalacia in trachea or bronchial tubes      Rectal bleeding  exam under anesthesia ([facility]) 2/2018        Allergies    aspirin (Short breath)  Avelox (Short breath; Pruritus)  cefepime (Anaphylaxis)  codeine (Short breath)  Dilaudid (Short breath)  iodine (Short breath; Swelling)  penicillin (Anaphylaxis)  shellfish (Anaphylaxis)  tetanus toxoid (Short breath)  Valium (Short breath)    Intolerances      MEDICATIONS  (STANDING):  acetylcysteine 10%  Inhalation 4 milliLiter(s) Inhalation every 12 hours  albuterol/ipratropium for Nebulization 3 milliLiter(s) Nebulizer every 6 hours  apixaban 5 milliGRAM(s) Oral every 12 hours  ascorbic acid 500 milliGRAM(s) Oral daily  buDESOnide    Inhalation Suspension 0.5 milliGRAM(s) Inhalation every 12 hours  chlorhexidine 2% Cloths 1 Application(s) Topical <User Schedule>  collagenase Ointment 1 Application(s) Topical daily  dextrose 50% Injectable 50 milliLiter(s) IV Push every 15 minutes  diphenhydramine 2%/zinc acetate 0.1% Cream 1 Application(s) Topical every 8 hours  dorzolamide 2% Ophthalmic Solution 1 Drop(s) Left EYE three times a day  fluconAZOLE IVPB 600 milliGRAM(s) IV Intermittent every 24 hours  hydrALAZINE 50 milliGRAM(s) Oral every 8 hours  insulin lispro (ADMELOG) corrective regimen sliding scale   SubCutaneous every 6 hours  insulin NPH human recombinant 10 Unit(s) SubCutaneous <User Schedule>  insulin NPH human recombinant 8 Unit(s) SubCutaneous <User Schedule>  insulin NPH human recombinant 6 Unit(s) SubCutaneous <User Schedule>  magnesium oxide 400 milliGRAM(s) Oral every 8 hours  methylPREDNISolone 8 milliGRAM(s) Oral daily  metoclopramide Injectable 5 milliGRAM(s) IV Push every 8 hours  mexiletine 200 milliGRAM(s) Oral every 8 hours  multivitamin 1 Tablet(s) Oral daily  pantoprazole  Injectable 40 milliGRAM(s) IV Push daily  sodium chloride 0.9%. 1000 milliLiter(s) (10 mL/Hr) IV Continuous <Continuous>  timolol 0.5% Solution 1 Drop(s) Left EYE two times a day  vancomycin  IVPB 750 milliGRAM(s) IV Intermittent every 12 hours  vancomycin  IVPB        MEDICATIONS  (PRN):  acetaminophen    Suspension .. 650 milliGRAM(s) Oral every 6 hours PRN Temp greater or equal to 38C (100.4F), Mild Pain (1 - 3)      Vital Signs Last 24 Hrs  T(C): 36.7 (02 Nov 2022 16:00), Max: 36.7 (02 Nov 2022 04:00)  T(F): 98 (02 Nov 2022 16:00), Max: 98 (02 Nov 2022 04:00)  HR: 94 (02 Nov 2022 18:00) (68 - 96)  BP: --  BP(mean): --  RR: 29 (02 Nov 2022 18:00) (16 - 41)  SpO2: 100% (02 Nov 2022 18:00) (97% - 100%)    Parameters below as of 02 Nov 2022 18:00  Patient On (Oxygen Delivery Method): tracheostomy collar    O2 Concentration (%): 40                          9.4    13.15 )-----------( 308      ( 02 Nov 2022 00:56 )             31.3    11-02    136  |  101  |  22  ----------------------------<  118<H>  4.2   |  26  |  0.44<L>    Ca    9.0      02 Nov 2022 00:56  Phos  3.4     11-02  Mg     2.0     11-02    TPro  6.7  /  Alb  3.0<L>  /  TBili  0.3  /  DBili  x   /  AST  17  /  ALT  16  /  AlkPhos  119  11-02       PHYSICAL EXAM:  Gen: NAD  Skin: No rashes, bruises, or lesions  Head: Normocephalic, Atraumatic  Face: no edema, erythema, or fluctuance. Parotid glands soft without mass  Eyes: left eye prosthetic   Nose: NGT in place   Mouth: No Stridor / Drooling / Trismus.  Mucosa moist, tongue/uvula midline, oropharynx clear  Neck: #8 portex cuffed (deflated) with velcro tie in place, currently on TC at 40%  Lymphatic: No lymphadenopathy             ENT ISSUE/POD: s/p trach now with hoarseness s/p PMV valve     HPI: 71F PMH h/o COPD, tracheomalacia s/p Tracheobronchoplasty (Prolene Mesh- 2016), and multiple intubations. Found on CTA to have type A aortic dissection s/p repair, multiple intubations throughout hospital course, s/p aspiration event 9/28 subsequently intubated and trach placed 10/11 by us (ENT). Seen by speech for passy-yair speaking valve evaluation. Pt on trach collar. Per speech vocal quality hoarse with good volume. Pt tolerated valve. ENT reconsulted for upper airway evaluation.         PAST MEDICAL & SURGICAL HISTORY:  Atrial fibrillation  paroxysmal, on eliquis      Diabetes  Type 2      COPD (chronic obstructive pulmonary disease)      Adrenal insufficiency  Medrol daily for over 50 years      Aortic insufficiency  moderate AR on echo 5/3/2018      Pelvic fracture      Asthma      Tracheobronchomalacia  diagnosed 2015, s/p bronchial thermoplasty 2016 (Dr Zapien); recent bronchoscopy 6/5/2018 revealed no evidence of tracheobronchomalacia in trachea or bronchial tubes      Colorectal cancer  4/2018- last treatment , chemo and radiation      Rectal bleeding      Seizure  x 1 1/7/18      DVT (deep venous thrombosis)  15-20 years ago, took coumadin      TIA (transient ischemic attack)  multiple, last 5 years ago - presents as right-sided weakness      History of partial hysterectomy  30 years ago - fibroids      H/O total knee replacement, bilateral  5 years ago      History of sinus surgery  multiple sinus surgeries      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      H/O pelvic surgery  5 years ago - s/p fracture      History of tracheomalacia  2015 - attempted tracheal stenting ([facility])- course complicated by obstruction, respiratory failure, multiple CPR attempts -  stent discontinued; 10/20/2016 Tracheobronchoplasty (Prolene Mesh) performed at [facility] by Dr Zapien      S/P bronchoscopy  6/5/2018 - [place] (Dr Zapien) no evidence of tracheobronchomalacia in trachea or bronchial tubes      Rectal bleeding  exam under anesthesia ([facility]) 2/2018        Allergies    aspirin (Short breath)  Avelox (Short breath; Pruritus)  cefepime (Anaphylaxis)  codeine (Short breath)  Dilaudid (Short breath)  iodine (Short breath; Swelling)  penicillin (Anaphylaxis)  shellfish (Anaphylaxis)  tetanus toxoid (Short breath)  Valium (Short breath)    Intolerances      MEDICATIONS  (STANDING):  acetylcysteine 10%  Inhalation 4 milliLiter(s) Inhalation every 12 hours  albuterol/ipratropium for Nebulization 3 milliLiter(s) Nebulizer every 6 hours  apixaban 5 milliGRAM(s) Oral every 12 hours  ascorbic acid 500 milliGRAM(s) Oral daily  buDESOnide    Inhalation Suspension 0.5 milliGRAM(s) Inhalation every 12 hours  chlorhexidine 2% Cloths 1 Application(s) Topical <User Schedule>  collagenase Ointment 1 Application(s) Topical daily  dextrose 50% Injectable 50 milliLiter(s) IV Push every 15 minutes  diphenhydramine 2%/zinc acetate 0.1% Cream 1 Application(s) Topical every 8 hours  dorzolamide 2% Ophthalmic Solution 1 Drop(s) Left EYE three times a day  fluconAZOLE IVPB 600 milliGRAM(s) IV Intermittent every 24 hours  hydrALAZINE 50 milliGRAM(s) Oral every 8 hours  insulin lispro (ADMELOG) corrective regimen sliding scale   SubCutaneous every 6 hours  insulin NPH human recombinant 10 Unit(s) SubCutaneous <User Schedule>  insulin NPH human recombinant 8 Unit(s) SubCutaneous <User Schedule>  insulin NPH human recombinant 6 Unit(s) SubCutaneous <User Schedule>  magnesium oxide 400 milliGRAM(s) Oral every 8 hours  methylPREDNISolone 8 milliGRAM(s) Oral daily  metoclopramide Injectable 5 milliGRAM(s) IV Push every 8 hours  mexiletine 200 milliGRAM(s) Oral every 8 hours  multivitamin 1 Tablet(s) Oral daily  pantoprazole  Injectable 40 milliGRAM(s) IV Push daily  sodium chloride 0.9%. 1000 milliLiter(s) (10 mL/Hr) IV Continuous <Continuous>  timolol 0.5% Solution 1 Drop(s) Left EYE two times a day  vancomycin  IVPB 750 milliGRAM(s) IV Intermittent every 12 hours  vancomycin  IVPB        MEDICATIONS  (PRN):  acetaminophen    Suspension .. 650 milliGRAM(s) Oral every 6 hours PRN Temp greater or equal to 38C (100.4F), Mild Pain (1 - 3)      Vital Signs Last 24 Hrs  T(C): 36.7 (02 Nov 2022 16:00), Max: 36.7 (02 Nov 2022 04:00)  T(F): 98 (02 Nov 2022 16:00), Max: 98 (02 Nov 2022 04:00)  HR: 94 (02 Nov 2022 18:00) (68 - 96)  BP: --  BP(mean): --  RR: 29 (02 Nov 2022 18:00) (16 - 41)  SpO2: 100% (02 Nov 2022 18:00) (97% - 100%)    Parameters below as of 02 Nov 2022 18:00  Patient On (Oxygen Delivery Method): tracheostomy collar    O2 Concentration (%): 40                          9.4    13.15 )-----------( 308      ( 02 Nov 2022 00:56 )             31.3    11-02    136  |  101  |  22  ----------------------------<  118<H>  4.2   |  26  |  0.44<L>    Ca    9.0      02 Nov 2022 00:56  Phos  3.4     11-02  Mg     2.0     11-02    TPro  6.7  /  Alb  3.0<L>  /  TBili  0.3  /  DBili  x   /  AST  17  /  ALT  16  /  AlkPhos  119  11-02       PHYSICAL EXAM:  Gen: NAD  Skin: No rashes, bruises, or lesions  Head: Normocephalic, Atraumatic  Face: no edema, erythema, or fluctuance. Parotid glands soft without mass  Eyes: left eye prosthetic   Nose: NGT in place   Mouth: No Stridor / Drooling / Trismus.  Mucosa moist, tongue/uvula midline, oropharynx clear  Neck: #8 portex cuffed (deflated) with velcro tie in place, currently on TC at 40%  Lymphatic: No lymphadenopathy    Reason for Laryngoscopy:    Patient was unable to cooperate with mirror.  Nasopharynx, oropharynx, and hypopharynx clear, no bleeding. Tongue base, posterior pharyngeal wall, vallecula, epiglottis, and subglottis appear normal. No erythema, edema, pooling of secretions, masses or lesions. Airway patent, no foreign body visualized. No glottic/supraglottic edema. True vocal cords, arytenoids, vestibular folds, ventricles, pyriform sinuses, and aryepiglottic folds appear normal bilaterally. Vocal cords mobile with good contact b/l.      Tracheoscopy:  After obtaining verbal consent, #8 cuffed portex trach was deflated and removed. Scope was slowly advanced into the trachea down to the balwinder, no erythema, tracheomalacia, no subglottic stenosis, B/L VC movile and intact, airway patent. A #7 portex uncuffed placed. Pt phonating well. Pt tolerated the procedure well without complications.

## 2022-11-02 NOTE — SPEAKING VALVE EVALUATION - SLP PATIENT PROFILE REVIEW
Contact 10/4/[id number]. Allergies: Aspirin, Avelox, cefepime, codeine, Dilaudid, iodine, penicillin, tetanus toxoid, valium, shellfish

## 2022-11-02 NOTE — PROGRESS NOTE ADULT - SUBJECTIVE AND OBJECTIVE BOX
DIABETES FOLLOW UP NOTE: Saw pt earlier today    Chief Complaint: Endocrine consult requested for management of T2DM    INTERVAL HX: Saw pt in CTU, able to nod yes/no to questions. PT with pt at time of visit. Per primary team pt tolerating 24 hour TFs of Glucerna at 65cc/hr. However, per I&Os NGT came out yesterday around 5pm and was replaced around 8pm with rebound hypoglycemia in the 60s. Also noted 12noon dose of NPH was given at 2pm instead making BG low at time NPH was picking while pt was not getting TFs. Also remains with hyperglycemia at 12noon after getting chronic dose of Prednisone 8mg for AI. Remains on antibiotics for sepsis. Pt able to nod yes/no to questions and also able to follow directions.      Review of Systems:  General: As above  Cardiovascular: No chest pain, palpitations  Respiratory: No SOB, no cough  GI: No nausea, vomiting, abdominal pain  Endocrine: No S&Sx of hypoglycemia    Allergies    aspirin (Short breath)  Avelox (Short breath; Pruritus)  cefepime (Anaphylaxis)  codeine (Short breath)  Dilaudid (Short breath)  iodine (Short breath; Swelling)  penicillin (Anaphylaxis)  shellfish (Anaphylaxis)  tetanus toxoid (Short breath)  Valium (Short breath)    Intolerances      MEDICATIONS  fluconAZOLE IVPB 600 milliGRAM(s) IV Intermittent every 24 hours  insulin lispro (ADMELOG) corrective regimen sliding scale   SubCutaneous every 6 hours  insulin NPH human recombinant 10 Unit(s) SubCutaneous <User Schedule>  insulin NPH human recombinant 8 Unit(s) SubCutaneous <User Schedule>  insulin NPH human recombinant 6 Unit(s) SubCutaneous <User Schedule>  insulin NPH human recombinant 40 Unit(s) SubCutaneous <User Schedule>  methylPREDNISolone 8 milliGRAM(s) Oral daily  vancomycin  IVPB 750 milliGRAM(s) IV Intermittent every 12 hours            PHYSICAL EXAM:  VITALS: T(C): 36.7 (11-02-22 @ 16:00)  T(F): 98 (11-02-22 @ 16:00), Max: 98 (11-02-22 @ 04:00)  HR: 92 (11-02-22 @ 17:00) (68 - 96)  BP: --  RR:  (16 - 41)  SpO2:  (97% - 100%)  Wt(kg): --  GENERAL: Female getting ready to work with PT in NAD.   HEENT: Trach in place, NGT with TFs off at time of visit for PT session.    Chest: Sternal incision healed  Abdomen: Soft, nontender, non distended  Extremities: Warm, no edema in all 4 exts. RUE wound vac with minimal whitish foamy out put  NEURO: Alert and able to nod to questions    LABS:  POCT Blood Glucose.: 207 mg/dL (11-02-22 @ 12:41)  POCT Blood Glucose.: 252 mg/dL (11-02-22 @ 08:33)Post steroid  POCT Blood Glucose.: 169 mg/dL (11-02-22 @ 06:40)  POCT Blood Glucose.: 131 mg/dL (11-01-22 @ 21:25)  POCT Blood Glucose.: 67 mg/dL (11-01-22 @ 20:02)  POCT Blood Glucose.: 66 mg/dL (11-01-22 @ 20:00)OFF TFs  POCT Blood Glucose.: 156 mg/dL (11-01-22 @ 17:43)  POCT Blood Glucose.: 287 mg/dL (11-01-22 @ 15:38)  POCT Blood Glucose.: 321 mg/dL (11-01-22 @ 14:12)  POCT Blood Glucose.: 102 mg/dL (11-01-22 @ 06:01)  POCT Blood Glucose.: 143 mg/dL (10-31-22 @ 18:21)  POCT Blood Glucose.: 113 mg/dL (10-31-22 @ 16:03)  POCT Blood Glucose.: 273 mg/dL (10-31-22 @ 12:35)  POCT Blood Glucose.: 144 mg/dL (10-31-22 @ 06:50)  POCT Blood Glucose.: 176 mg/dL (10-31-22 @ 00:27)  POCT Blood Glucose.: 131 mg/dL (10-30-22 @ 18:14)                            9.4    13.15 )-----------( 308      ( 02 Nov 2022 00:56 )             31.3       11-02    136  |  101  |  22  ----------------------------<  118<H>  4.2   |  26  |  0.44<L>    eGFR: 101    Ca    9.0      11-02  Mg     2.0     11-02  Phos  3.4     11-02    TPro  6.7  /  Alb  3.0<L>  /  TBili  0.3  /  DBili  x   /  AST  17  /  ALT  16  /  AlkPhos  119  11-02    A1C with Estimated Average Glucose Result: 9.4 % (09-06-22 @ 16:46)      Estimated Average Glucose: 223 mg/dL (09-06-22 @ 16:46)

## 2022-11-02 NOTE — PROGRESS NOTE ADULT - ASSESSMENT

## 2022-11-02 NOTE — PROGRESS NOTE ADULT - NS ATTEND AMEND GEN_ALL_CORE FT
Pt seen and examined with ACP.  Assessment and plan reviewed and discussed.  Agree with above.  D/W Primar Team ACP    I spent  25 minutes face to face w/ this pt of which more than 50% of the time was spent counseling & coordinating care of this pt.
Pt seen and examined with team at time of service, I was physically present for the key portions for evaluation and management (E/M) service provided, and preformed key portions of the procedure. Agree with above. Plan discussed with primary team.  S/p tracheostomy; surgical site in neck soft and flat. No crepitous or excessive oozing.  Trach secured. will follow
S/p tracheostomy. Trach in place and patent. Ventilating well. Will continue to follow. Any trach issues please call ENT [phone].
S/p tracheostomy. Trach in place and patent. Ventilating well. Will continue to follow. Any trach issues please call ENT [phone].
trach in place   doing well  c/w routine trach care
ENT Consulted for dysphonia. Bedside laryngoscopy and tracheoscopy were unremarkable no granulation obstruction the view from above the larynx or below the larynx. A #7 portex uncuffed was exchanged for the #8 portex. Pt phonating well. Recommend reeval for PMV. Horsiness likely from the larger portex not allowing enough air to pass through the Vocal fold to allow proper speech. Please Call ENT as needed

## 2022-11-02 NOTE — PROGRESS NOTE ADULT - NSPROGADDITIONALINFOA_GEN_ALL_CORE
-Plan discussed with pt/team.  Contact info: [phone] (24/7). pager [phone]  [email or web address] password [email or web address]  Teams  Spent 28 minutes assessing pt/labs/meds and discussing plan of care with primary team  Adjusting insulin  Discharge plan  Follow up care

## 2022-11-02 NOTE — PROGRESS NOTE ADULT - ASSESSMENT
71F PMH h/o COPD, tracheomalacia s/p Tracheobronchoplasty (Prolene Mesh- 2016), and multiple intubations. Found on CTA to have type A aortic dissection s/p repair, multiple intubations throughout hospital course, s/p aspiration event 9/28 subsequently intubated and trach placed 10/11 by us (ENT). Currently with #8 portex cuffed (deflated) on TC. Seen by speech for passy-yair speaking valve evaluation. Per speech vocal quality hoarse, we are now reconsulted for upper airway evaluation. Attempted to do laryngoscopy at bedside, pt unable to stay awake during exam. Notified nurse who reports patient is like this when she is sleepy/drowsy. Will reattempt in the AM   71F PMH h/o COPD, tracheomalacia s/p Tracheobronchoplasty (Prolene Mesh- 2016), and multiple intubations. Found on CTA to have type A aortic dissection s/p repair, multiple intubations throughout hospital course, s/p aspiration event 9/28 subsequently intubated and trach placed 10/11 by us (ENT). Currently had a #8 portex cuffed (deflated) on TC. Seen by speech for passy-yair speaking valve evaluation. Per speech vocal quality hoarse, we are now reconsulted for upper airway evaluation. Bedside laryngoscopy and tracheoscopy were unremarkable. Pt is phonating well. Recommend reeval for PMV. 71F PMH h/o COPD, tracheomalacia s/p Tracheobronchoplasty (Prolene Mesh- 2016), and multiple intubations. Found on CTA to have type A aortic dissection s/p repair, multiple intubations throughout hospital course, s/p aspiration event 9/28 subsequently intubated and trach placed 10/11 by us (ENT). Currently had a #8 portex cuffed (deflated) on TC. Seen by speech for passy-yair speaking valve evaluation. Per speech vocal quality hoarse, we are now reconsulted for upper airway evaluation. Bedside laryngoscopy and tracheoscopy were unremarkable. After exchanged the #8 portex for a #7 protex. Pt phonating well. Recommend reeval for PMV.

## 2022-11-02 NOTE — PROGRESS NOTE ADULT - SUBJECTIVE AND OBJECTIVE BOX
Patient seen and examined at the bedside.    Remained critically ill on continuous ICU monitoring.    OBJECTIVE:  Vital Signs Last 24 Hrs  T(C): 36.7 (02 Nov 2022 04:00), Max: 36.7 (02 Nov 2022 04:00)  T(F): 98 (02 Nov 2022 04:00), Max: 98 (02 Nov 2022 04:00)  HR: 72 (02 Nov 2022 06:18) (49 - 89)  BP: --  BP(mean): --  RR: 41 (02 Nov 2022 04:00) (17 - 41)  SpO2: 98% (02 Nov 2022 06:18) (92% - 100%)    Parameters below as of 02 Nov 2022 06:18  Patient On (Oxygen Delivery Method): tracheostomy collar    Physical Exam:   General: Trach collar  Neurology: Nonfocal, responds to commands all extremities  ENT/Neck: + trach c/d/i, neck supple, trachea midline   Respiratory: coarse BS b/l, rhonchi throughout, secretions present  CV: S1S2, no murmurs        [x] Sinus Rhythm   Abdominal: Soft, NT, ND, colostomy in place (stoma intact)  Extremities: nonpitting edema, improved. Right elbow wound vac c/d/i. warm and well-perfused.  Skin: Dry dressing over right heel. No cyanosis               Assessment:  71F PMH DM, COPD, chronic adrenal insufficiency on Prednisone, history of colorectal cancer s/p resection (colostomy bag), Hx of CAD, chronic AF on Eliquis, and tracheomalacia s/p multiple intubations, recent dx of R foot OM s/p debridement on antibiotics and presented to ED at [facility] this morning with epigastric pain, belching and central chest pain. Found on CTA to have type A aortic dissection and transferred to [facility] for surgical evaluation by Dr. Cabrera.     Ascending aortic dissection s/p type A dissection repair and Biobentall on 9/7   Respiratory failure s/p Trach 10/10/22  Hypovolemia   Post op respiratory failure   Acute blood loss anemia  Stress hyperglycemia   RIJ thrombus  MRSA PNA    Plan:   ***Neuro***  [x] Nonfocal  follows simple commands, continue to monitor  PT/OT progress as tolerated, ambulated with physical therapy    ***Cardiovascular***  Limited TTE on 10/1: EF 69%, Hyperdynamic left ventricular systolic function. There is hypokinesis of the mid-base inferior wall. Nml RV fxn.   CT chest on 9/28: No CT evidence of pulmonary embolism. Status post repair of ascending aortic dissection with a stable dissection flap originating from the aortic arch and extending into the infrarenal abdominal aorta,  B/l UE duplex on 10/5: As before, there is an occluded RIGHT IJ vein with thrombosis extending to brachiocephalic vein. Superficial thrombophlebitis of the LEFT cephalic vein.   Invasive hemodynamic monitoring, assess perfusion indices   SR / MAP 96 / Hct 31.3% / Lactate 1.5  Continuos reassessment of hemodynamics   Hydralazine PO for BP management   Rate control with Mexiletine   WVAC on rt elbow  changed 10/26  [x] AC Therapy with Eliquis 5 BID for Afib and IJ thrombus  Serial EKG and cardiac enzymes     ***Pulmonary***  Trach collar 10L/40% since 10/25, continue  Respiratory failure s/p Trach #8 portex  10/10/22, per ENT inner cannula needs to be changed daily, trach sutures d/c'ed by ENT 10/17 , 40% trach collar for 2 days  Titration of FiO2, follow SpO2, CXR, blood gases   Bronched 10/13  Continue duonebs  Suction q2hrly as needed   Aggressive Pulmonary toilet    ***GI***  [x] Diet: TFs Glucerna 1.2 @ goal 65ml/hr, tolerating without issues (when glucerna 1.5 back in stock will switch and decrease goal rate to 55ml/hr)  [x] Protonix    Reglan for gut motility     ***Renal***  Continue to monitor I/Os, BUN/Creatinine.   Replete lytes PRN    ***ID***  SCx on 10/4+Methicillin resistant Staphylococcus aureus, c/w IVPB Vancomycin, (plan for 6 week course in setting of valve surgery, 11/26 end date)  9/27 blood culture Neela Glabarata, on flucanazole (lifelong suppression)  BCx 10/13 NGTD, BC 10/21 and SCx NGTD  R elbow wound, S/p IR for elbow drainage 10/13 + Few Proteus mirabilis ESBL, Meropenem 7 day trial completed 10/19-> reordered 10/20 for reaccumulation of elbow bursitis-  10/22 plastics debrided R elbow eschar to subc tissue, ~8cc fluid aspirated and sent for culture. Wet to dry dressing changed 2x daily. PT changed vac on 10/28  Joint Fluid Cx 10/22 NTD  Meropenem for 8 day course (until 10/28),started in setting of elbow collection 10/22, f/u ID for possible shorter course given culture NTD, changed vac by physical therapy wound is healing, last change 10/31    ***Endocrine***  [x]  DM : HbA1c 9.4%                - [x] ISS  [x] NPH              - Need tight glycemic control to prevent wound infection.  Endocrine following, appreciate recommendations        Patient requires continuous monitoring with bedside rhythm monitoring, pulse oximetry monitoring, and continuous central venous and arterial pressure monitoring; and intermittent blood gas analysis. Care plan discussed with the ICU care team.   Patient remained critical, at risk for life threatening decompensation.    I have spent 30 minutes providing critical care management to this patient.    By signing my name below, I, Bharti Barrios, attest that this documentation has been prepared under the direction and in the presence of KIANA Briseno.  Electronically signed: Georgi Gottlieb, 11-02-22 @ 07:10    I, Humza Samuels, personally performed the services described in this documentation. all medical record entries made by the scribe were at my direction and in my presence. I have reviewed the chart and agree that the record reflects my personal performance and is accurate and complete  Electronically signed: KIANA Briseno. Patient seen and examined at the bedside.    Remained critically ill on continuous ICU monitoring.    OBJECTIVE:  Vital Signs Last 24 Hrs  T(C): 36.7 (02 Nov 2022 04:00), Max: 36.7 (02 Nov 2022 04:00)  T(F): 98 (02 Nov 2022 04:00), Max: 98 (02 Nov 2022 04:00)  HR: 72 (02 Nov 2022 06:18) (49 - 89)  RR: 41 (02 Nov 2022 04:00) (17 - 41)  SpO2: 98% (02 Nov 2022 06:18) (92% - 100%)    Parameters below as of 02 Nov 2022 06:18  Patient On (Oxygen Delivery Method): tracheostomy collar    Physical Exam:   General: Trach collar, NAD  Neurology: Nonfocal, responds to commands. Moves all extremities  ENT/Neck: + trach c/d/i, neck supple, trachea midline   Respiratory: coarse BS b/l, rhonchi throughout, minimal secretions present  CV: S1S2, no murmurs        [x] Sinus Rhythm   Abdominal: Soft, NT, ND, colostomy in place (stoma intact)  Extremities: nonpitting edema, improved. Right elbow wound vac c/d/i. warm and well-perfused.  Skin: Dry dressing over right heel. No cyanosis               Assessment:  71F PMH DM, COPD, chronic adrenal insufficiency on Prednisone, history of colorectal cancer s/p resection (colostomy bag), Hx of CAD, chronic AF on Eliquis, and tracheomalacia s/p multiple intubations, recent dx of R foot OM s/p debridement on antibiotics and presented to ED at [facility] this morning with epigastric pain, belching and central chest pain. Found on CTA to have type A aortic dissection and transferred to [facility] for surgical evaluation by Dr. Cabrera.     Ascending aortic dissection s/p type A dissection repair and Biobentall on 9/7   Respiratory failure s/p Trach 10/10/22  Hypovolemia   Post op respiratory failure   Acute blood loss anemia  Stress hyperglycemia   RIJ thrombus  MRSA PNA    Today's Plan: Continue TC. Work/ambulate with PT. Speech eval for PMV. Elbow vac changed today.    Plan:   ***Neuro***  [x] Nonfocal  follows simple commands, continue to monitor  PT/OT progress as tolerated, ambulated with physical therapy    ***Cardiovascular***  Limited TTE on 10/1: EF 69%, Hyperdynamic left ventricular systolic function. There is hypokinesis of the mid-base inferior wall. Nml RV fxn.   CT chest on 9/28: No CT evidence of pulmonary embolism. Status post repair of ascending aortic dissection with a stable dissection flap originating from the aortic arch and extending into the infrarenal abdominal aorta,  B/l UE duplex on 10/5: As before, there is an occluded RIGHT IJ vein with thrombosis extending to brachiocephalic vein. Superficial thrombophlebitis of the LEFT cephalic vein.   Invasive hemodynamic monitoring, assess perfusion indices   SR / MAP 96 / Hct 31.3% / Lactate 1.5  Continuos reassessment of hemodynamics   Hydralazine PO for BP management   Rate control with Mexiletine   WVAC on rt elbow  changed today   [x] AC Therapy with Eliquis 5 BID for Afib and IJ thrombus      ***Pulmonary***  Trach collar 10L/40% since 10/25, continue  Respiratory failure s/p Trach #8 portex  10/10/22, per ENT inner cannula needs to be changed daily, trach sutures d/c'ed by ENT 10/17 , 40% trach collar for 2 days  Titration of FiO2, follow SpO2, CXR, blood gases   Bronched 10/13  Continue duonebs  Suction q2hrly as needed   Aggressive Pulmonary toilet    ***GI***  [x] Diet: TFs Glucerna 1.2 @ goal 65ml/hr, tolerating without issues (when glucerna 1.5 back in stock will switch and decrease goal rate to 55ml/hr)  [x] Protonix    Reglan for gut motility     ***Renal***  Continue to monitor I/Os, BUN/Creatinine.   Replete lytes PRN    ***ID***  SCx on 10/4+Methicillin resistant Staphylococcus aureus, c/w IVPB Vancomycin, (plan for 6 week course in setting of valve surgery, 11/26 end date)  9/27 blood culture Neela Glabarata, on flucanazole (lifelong suppression)  BCx 10/13 NGTD, BC 10/21 and SCx NGTD  R elbow wound, S/p IR for elbow drainage 10/13 + Few Proteus mirabilis ESBL, Meropenem 7 day trial completed 10/19-> reordered 10/20 for reaccumulation of elbow bursitis-  10/22 plastics debrided R elbow eschar to subc tissue, ~8cc fluid aspirated and sent for culture. Wet to dry dressing changed 2x daily. PT changed vac today   Joint Fluid Cx 10/22 NTD  Meropenem for 8 day course (until 10/28),started in setting of elbow collection 10/22,Course completed today.    ***Endocrine***  [x]  DM : HbA1c 9.4%                - [x] ISS  [x] NPH              - Need tight glycemic control to prevent wound infection.  Endocrine following, appreciate recommendations        Patient requires continuous monitoring with bedside rhythm monitoring, pulse oximetry monitoring, and continuous central venous and arterial pressure monitoring; and intermittent blood gas analysis. Care plan discussed with the ICU care team.   Patient remained critical, at risk for life threatening decompensation.    I have spent 30 minutes providing critical care management to this patient.    By signing my name below, I, Bharti Barrios, attest that this documentation has been prepared under the direction and in the presence of KIANA Briseno.  Electronically signed: Georgi Gottlieb, 11-02-22 @ 07:10    I, Humza Samuels, personally performed the services described in this documentation. all medical record entries made by the marcyibe were at my direction and in my presence. I have reviewed the chart and agree that the record reflects my personal performance and is accurate and complete  Electronically signed: KIANA Briseno.

## 2022-11-02 NOTE — SPEAKING VALVE EVALUATION - DIAGNOSTIC IMPRESSIONS
71F PMH h/o COPD, tracheomalacia s/p tracheoplasty, and multiple intubations. Found on CTA to have type A aortic dissection s/p repair, multiple intubations throughout hospital course, s/p aspiration event 9/28 subsequently intubated and trach placed 10/11. Pt seen for passy-yair speaking valve evaluation. Pt maintained on 40% FIO2 via trach collar. Low pressure valve placed on hub of trach. Vocal quality hoarse with good volume. Pt tolerated valve. VSS stable throughout assessment (RR noted to range between 15-32). No signs of respiratory distress. No increased work of breathing. No subjective complaints. No signs of air trapping. Valve removed after 35 minutes. Given new onset hoarse vocal quality and h/o tracheomalacia, recommend ENT evaluation to evaluate laryngeal structures. Ongoing assessment warranted to determine candidacy for PMV use.

## 2022-11-02 NOTE — PROGRESS NOTE ADULT - ASSESSMENT
71F w/h/o uncontrolled T2DM (A1C 9.4%) on basal/bolus insulin PTA. Unknown DM complications. Also COPD, secondary adrenal Insufficiency on chronic steroids, colorectal cancer s/p resection (colostomy bag), Chronic A fib on Eliquis, and tracheomalacia and multiple intubations, recent dx of OM presented to ED at [facility] with epigastric pain, belching and central chest pain. Found on CTA to have type A aortic dissection and transferred to [facility] for surgical evaluation by Dr. Cabrera. Now s/p aortic dissection repair on 9/07/22. Endocrine consulted for assistance with uncontrolled DM/steroids for AI. Pt in ICU with ventricular dysfunction/sepsis, and now s/p trach 10/10 and more recently s/p R elbow eschar debridement 10/22 > Wound VAC 10/24. Tolerating TFs with BG levels variable in the last 24 hours including hypoglycemia while off TFs yesterday evening and also after receiving 12noon NPH insulin late (2pm). Also remains with BG >200s at 12noon after taking Prednisone in am. Preventively decreased NPH dose at 12noon for now. Will continue to increase NPH in am at time pt takes steroid dose. BG goal 100 [id number]. Pt remains with leukocytosis and resolved transaminitis on long term antibiotic. On Prednisone dose 8mg for AI.

## 2022-11-02 NOTE — SPEAKING VALVE EVALUATION - SLP GENERAL OBSERVATIONS
Pt encountered upright in bed, on 40% TC, + #8 Cuffed Portex deflated, + tele (HR 98; /68; 02 100; RR 28). Patient aphonic in presence of tracheostomy, Aox3 by mouthing. Pt encountered upright in bed, on 40% TC, + #8 Cuffed Portex deflated, + NGT, + tele (HR 98; /68; 02 100; RR 28). Patient aphonic in presence of tracheostomy, Aox3 by mouthing.

## 2022-11-02 NOTE — PROGRESS NOTE ADULT - SUBJECTIVE AND OBJECTIVE BOX
CHIEF COMPLAINT: f/up sob, chronic resp failure, TBM, severe persistent asthma, VC dysfunction, Type A aortic dissection s/p repair w/modified "Bentall procedure and hemiarch replacement 9/6-trached--on TC-slept well, no pain or sob    Interval Events: none    REVIEW OF SYSTEMS:  Constitutional: No fevers or chills. No weight loss. No fatigue or generalized malaise.  Eyes: No itching or discharge from the eyes  ENT: No ear pain. No ear discharge. No nasal congestion. No post nasal drip. No epistaxis. No throat pain. No sore throat. No difficulty swallowing.   CV: No chest pain. No palpitations. No lightheadedness or dizziness.   Resp: No dyspnea at rest. No dyspnea on exertion. No orthopnea. No wheezing. No cough. No stridor. No sputum production. No chest pain with respiration.  GI: No nausea. No vomiting. No diarrhea.  MSK: No joint pain or pain in any extremities  Integumentary: No skin lesions. No pedal edema.  Neurological: + gross motor weakness. No sensory changes.  [+ ] All other systems negative  [ ] Unable to assess ROS because ________    OBJECTIVE:  ICU Vital Signs Last 24 Hrs  T(C): 36.7 (02 Nov 2022 04:00), Max: 36.7 (02 Nov 2022 04:00)  T(F): 98 (02 Nov 2022 04:00), Max: 98 (02 Nov 2022 04:00)  HR: 75 (02 Nov 2022 04:00) (49 - 89)  BP: --  BP(mean): --  ABP: 173/61 (02 Nov 2022 04:00) (129/46 - 173/61)  ABP(mean): 96 (02 Nov 2022 04:00) (67 - 96)  RR: 41 (02 Nov 2022 04:00) (17 - 41)  SpO2: 100% (02 Nov 2022 04:00) (92% - 100%)    O2 Parameters below as of 02 Nov 2022 04:00  Patient On (Oxygen Delivery Method): tracheostomy collar    O2 Concentration (%): 40          10-31 @ 07:01  -  11-01 @ 07:00  --------------------------------------------------------  IN: 2850 mL / OUT: 485 mL / NET: 2365 mL    11-01 @ 07:01  - 11-02 @ 05:10  --------------------------------------------------------  IN: 2455 mL / OUT: 1400 mL / NET: 1055 mL      CAPILLARY BLOOD GLUCOSE      POCT Blood Glucose.: 131 mg/dL (01 Nov 2022 21:25)      PHYSICAL EXAM: NAD in bed on TC  General: Awake, alert, oriented X 3.   HEENT: Atraumatic, normocephalic.                 Mallampatti Grade 2                No nasal congestion.                No tonsillar or pharyngeal exudates.  Lymph Nodes: No palpable lymphadenopathy  Neck: No JVD. No carotid bruit.   Respiratory: abnormal chest expansion                         Normal percussion                         Normal and equal air entry                         No wheeze, rhonchi or rales.  Cardiovascular: S1 S2 normal. No murmurs, rubs or gallops.   Abdomen: Soft, non-tender, non-distended. No organomegaly. Normoactive bowel sounds.  Extremities: Warm to touch. Peripheral pulse palpable. No pedal edema.   Skin: No rashes or skin lesions  Neurological: Motor and sensory examination equal and normal in all four extremities.  Psychiatry: Appropriate mood and affect.    HOSPITAL MEDICATIONS:  MEDICATIONS  (STANDING):  acetylcysteine 10%  Inhalation 4 milliLiter(s) Inhalation every 12 hours  albuterol/ipratropium for Nebulization 3 milliLiter(s) Nebulizer every 6 hours  apixaban 5 milliGRAM(s) Oral every 12 hours  ascorbic acid 500 milliGRAM(s) Oral daily  buDESOnide    Inhalation Suspension 0.5 milliGRAM(s) Inhalation every 12 hours  chlorhexidine 2% Cloths 1 Application(s) Topical <User Schedule>  collagenase Ointment 1 Application(s) Topical daily  dextrose 50% Injectable 50 milliLiter(s) IV Push every 15 minutes  diphenhydramine 2%/zinc acetate 0.1% Cream 1 Application(s) Topical every 8 hours  dorzolamide 2% Ophthalmic Solution 1 Drop(s) Left EYE three times a day  fluconAZOLE IVPB 600 milliGRAM(s) IV Intermittent every 24 hours  hydrALAZINE 50 milliGRAM(s) Oral every 8 hours  insulin lispro (ADMELOG) corrective regimen sliding scale   SubCutaneous every 6 hours  insulin NPH human recombinant 8 Unit(s) SubCutaneous <User Schedule>  insulin NPH human recombinant 6 Unit(s) SubCutaneous <User Schedule>  insulin NPH human recombinant 40 Unit(s) SubCutaneous <User Schedule>  insulin NPH human recombinant 15 Unit(s) SubCutaneous <User Schedule>  magnesium oxide 400 milliGRAM(s) Oral every 8 hours  meropenem  IVPB      meropenem  IVPB 1000 milliGRAM(s) IV Intermittent every 8 hours  methylPREDNISolone 8 milliGRAM(s) Oral daily  metoclopramide Injectable 5 milliGRAM(s) IV Push every 8 hours  mexiletine 200 milliGRAM(s) Oral every 8 hours  multivitamin 1 Tablet(s) Oral daily  pantoprazole  Injectable 40 milliGRAM(s) IV Push daily  sodium chloride 0.9%. 1000 milliLiter(s) (10 mL/Hr) IV Continuous <Continuous>  timolol 0.5% Solution 1 Drop(s) Left EYE two times a day  vancomycin  IVPB 750 milliGRAM(s) IV Intermittent every 12 hours  vancomycin  IVPB        MEDICATIONS  (PRN):  acetaminophen    Suspension .. 650 milliGRAM(s) Oral every 6 hours PRN Temp greater or equal to 38C (100.4F), Mild Pain (1 - 3)      LABS:                        9.4    13.15 )-----------( 308      ( 02 Nov 2022 00:56 )             31.3     11-02    136  |  101  |  22  ----------------------------<  118<H>  4.2   |  26  |  0.44<L>    Ca    9.0      02 Nov 2022 00:56  Phos  3.4     11-02  Mg     2.0     11-02    TPro  6.7  /  Alb  3.0<L>  /  TBili  0.3  /  DBili  x   /  AST  17  /  ALT  16  /  AlkPhos  119  11-02        Arterial Blood Gas:  11-02 @ 00:40  7.43/46/99/30/98.8/5.5  ABG lactate: --  Arterial Blood Gas:  11-01 @ 15:42  7.39/49/83/30/96.8/4.0  ABG lactate: --  Arterial Blood Gas:  11-01 @ 14:10  7.43/43/133/28/99.2/3.7  ABG lactate: --  Arterial Blood Gas:  11-01 @ 00:12  7.39/48/114/29/99.1/3.6  ABG lactate: --        MICROBIOLOGY:     RADIOLOGY:  [ ] Reviewed and interpreted by me    Point of Care Ultrasound Findings:    PFT:    EKG:

## 2022-11-02 NOTE — SPEAKING VALVE EVALUATION - COMMENTS
Seen by this service 9/14 for a bedside swallow evaluation with recommendations for soft-bite sized solids/mildly thick liquids. Plan for MBS pending transfer to floor. However, pt transferred to PICU and developed new baseline cough, leukocytosis, intermittent delirium and agitation, and ongoing workup for infection. Seen for follow up 9/16 with recommendations for NPO, with non-oral nutrition/hydration/medications pending workup and improvement in mentation. Tentative plan for FEES 9/20. FEES completed 9/20 with recommendations for regular solids/thin liquids.

## 2022-11-02 NOTE — PROGRESS NOTE ADULT - PROBLEM SELECTOR PLAN 2
Will return in the AM for upper airway evaluation. Patient too drowsy and unable to stay awake for laryngoscopy -After exchanged the #8 portex for a #7 protex. Pt phonating well. Recommend reeval for PMV.

## 2022-11-02 NOTE — PROGRESS NOTE ADULT - PROBLEM SELECTOR PLAN 1
Currently with #8 portex cuffed (deflated) on TC. Please call for downsize when needed.  Continue trach care F/U Speech for PMV  Continue trach care  Call ENT with questions

## 2022-11-02 NOTE — PROGRESS NOTE ADULT - SUBJECTIVE AND OBJECTIVE BOX
INFECTIOUS DISEASES FOLLOW UP-- Fouzia Stephenach  [phone]    This is a follow up note for this  72yFemale with  Dissection of thoracic aorta        ROS:  CONSTITUTIONAL:  No fever, good appetite  CARDIOVASCULAR:  No chest pain or palpitations  RESPIRATORY:  No dyspnea  GASTROINTESTINAL:  No nausea, vomiting, diarrhea, or abdominal pain  GENITOURINARY:  No dysuria  NEUROLOGIC:  No headache,     Allergies    aspirin (Short breath)  Avelox (Short breath; Pruritus)  cefepime (Anaphylaxis)  codeine (Short breath)  Dilaudid (Short breath)  iodine (Short breath; Swelling)  penicillin (Anaphylaxis)  shellfish (Anaphylaxis)  tetanus toxoid (Short breath)  Valium (Short breath)    Intolerances        ANTIBIOTICS/RELEVANT:  antimicrobials  fluconAZOLE IVPB 600 milliGRAM(s) IV Intermittent every 24 hours  vancomycin  IVPB 750 milliGRAM(s) IV Intermittent every 12 hours  vancomycin  IVPB        immunologic:    OTHER:  acetaminophen    Suspension .. 650 milliGRAM(s) Oral every 6 hours PRN  acetylcysteine 10%  Inhalation 4 milliLiter(s) Inhalation every 12 hours  albuterol/ipratropium for Nebulization 3 milliLiter(s) Nebulizer every 6 hours  apixaban 5 milliGRAM(s) Oral every 12 hours  ascorbic acid 500 milliGRAM(s) Oral daily  buDESOnide    Inhalation Suspension 0.5 milliGRAM(s) Inhalation every 12 hours  chlorhexidine 2% Cloths 1 Application(s) Topical <User Schedule>  collagenase Ointment 1 Application(s) Topical daily  dextrose 50% Injectable 50 milliLiter(s) IV Push every 15 minutes  diphenhydramine 2%/zinc acetate 0.1% Cream 1 Application(s) Topical every 8 hours  dorzolamide 2% Ophthalmic Solution 1 Drop(s) Left EYE three times a day  hydrALAZINE 50 milliGRAM(s) Oral every 8 hours  insulin lispro (ADMELOG) corrective regimen sliding scale   SubCutaneous every 6 hours  insulin NPH human recombinant 10 Unit(s) SubCutaneous <User Schedule>  insulin NPH human recombinant 8 Unit(s) SubCutaneous <User Schedule>  insulin NPH human recombinant 6 Unit(s) SubCutaneous <User Schedule>  magnesium oxide 400 milliGRAM(s) Oral every 8 hours  methylPREDNISolone 8 milliGRAM(s) Oral daily  metoclopramide Injectable 5 milliGRAM(s) IV Push every 8 hours  mexiletine 200 milliGRAM(s) Oral every 8 hours  multivitamin 1 Tablet(s) Oral daily  pantoprazole  Injectable 40 milliGRAM(s) IV Push daily  sodium chloride 0.9%. 1000 milliLiter(s) IV Continuous <Continuous>  timolol 0.5% Solution 1 Drop(s) Left EYE two times a day      Objective:  Vital Signs Last 24 Hrs  T(C): 36.7 (02 Nov 2022 16:00), Max: 36.7 (02 Nov 2022 04:00)  T(F): 98 (02 Nov 2022 16:00), Max: 98 (02 Nov 2022 04:00)  HR: 94 (02 Nov 2022 18:00) (68 - 96)  BP: --  BP(mean): --  RR: 29 (02 Nov 2022 18:00) (16 - 41)  SpO2: 100% (02 Nov 2022 18:00) (97% - 100%)    Parameters below as of 02 Nov 2022 18:00  Patient On (Oxygen Delivery Method): tracheostomy collar    O2 Concentration (%): 40    PHYSICAL EXAM:  Constitutional:no acute distress  Eyes:EBER, EOMI  Ear/Nose/Throat: no oral lesions, trach with speaking valve	  Respiratory: clear BL coarse  Cardiovascular: S1S2  Gastrointestinal:soft, (+) BS, no tenderness  Extremities:no e/e/c  No Lymphadenopathy  IV sites not inflammed.    LABS:                        9.4    13.15 )-----------( 308      ( 02 Nov 2022 00:56 )             31.3     11-02    136  |  101  |  22  ----------------------------<  118<H>  4.2   |  26  |  0.44<L>    Ca    9.0      02 Nov 2022 00:56  Phos  3.4     11-02  Mg     2.0     11-02    TPro  6.7  /  Alb  3.0<L>  /  TBili  0.3  /  DBili  x   /  AST  17  /  ALT  16  /  AlkPhos  119  11-02          MICROBIOLOGY:    COVID-19 PCR: NotDetec: You can help in the fight against COVID-19. [facility] may contact  you to see if you are interested in voluntarily participating in one of  our clinical trials.  Testing is performed using polymerase chain reaction (PCR) or  transcription mediated amplification (TMA). This COVID-19 (SARS-CoV-2)  nucleic acid amplification test was validated by [employer] and is  in use under the FDA Emergency Use Authorization (EUA) for clinical labs  CLIA-certified to perform high complexity testing. Test results should be  correlated with clinical presentation, patient history, and epidemiology. (11.01.22 @ 07:53)            RECENT CULTURES:  COVID-19 PCR: NotDetec: You can help in the fight against COVID-19. [employer] may contact  you to see if you are interested in voluntarily participating in one of  our clinical trials.  Testing is performed using polymerase chain reaction (PCR) or  transcription mediated amplification (TMA). This COVID-19 (SARS-CoV-2)  nucleic acid amplification test was validated by [employer] and is  in use under the FDA Emergency Use Authorization (EUA) for clinical labs  CLIA-certified to perform high complexity testing. Test results should be  correlated with clinical presentation, patient history, and epidemiology. (11.01.22 @ 07:53)        RADIOLOGY & ADDITIONAL STUDIES:    < from: Xray Chest 1 View- PORTABLE-Routine (Xray Chest 1 View- PORTABLE-Routine in AM.) (11.02.22 @ 04:52) >  Chest radiograph 11/1/2022 at 6:39 PM: Tracheostomy. Enteric tube with   tip in the stomach. Sternotomy and aorticvalve replacement. The heart is   normal in size. Clear lungs. No pleural effusion or pneumothorax.    Chest radiograph 11/2/2022 at 2:26 AM: The enteric tube is traveling   below the diaphragm with tip not seen on this projection. Otherwise no   interval change.    IMPRESSION:  Enteric tube traveling below the diaphragm.    < end of copied text >

## 2022-11-02 NOTE — PROGRESS NOTE ADULT - ASSESSMENT
71 year-old female with a history of DM2, CAD, A-Fib on apixaban, Severe Persistent Asthma (on chronic steroids, recently started on Tezspire), colon cancer s/p resection/chemo, and tracheobronchomalacia s/p tracheoplasty, and recent OM of the R foot s/b debridement and completed course of Vancomycin/Ertapenem for MRSA/ESBL Proteus/Corynebacterium who now presents with chest pain, found to have Type A dissection s/p repair with modified Bentall procedure and hemiarch replacement on 9/6/22. Post-op course complicated by acute hypoxemic respiratory failure, shock, anemia, and hyperglycemia requiring insulin gtt. Extubated 9/13, now awake and alert with mild encephalopathy but overall significantly improved.    Assessment:  Acute hypoxemic respiratory failure requiring intubation  Type A Aortic Dissection  Severe Persistent Asthma  History of Tracheobronchomalacia  fevers and MSSA bacteremia and tracheal aspirate material with MSSA    -leukocytosis  Fevers and MRSA bacteremia last + culture 9/28  last positive Candida blood culture 9/30  Continue IV vancomycin- trough remains therapeutic  Right elbow growing ESBL Proteus -continue Meropenem and she may need further drainage or a washout to the joint given persistent positive cultures and leukocytosis, with next wound vac change would send a repeat photo and culture of the elbow drainage        MRSA  bacteremia and candidemia  Will plan to treat for 6 weeks in the setting of post surgical repair of thoracic aorta dissection through 11/15/22 after which she may need chronic oral suppression with Bactrim plus fluconazole  Continue to follow CBC  Continue to follow creatinine  Continue to follow Vanco trough levels Vancomycin Level, Trough: 11.2:   repeat blood cultures for evidence of fungemia and bacteremia clearance show evidence of clearing of infections and once IV antibiotics are completed will discuss oral suppression options  ESBL proteus in elbow fluid sensitive to meropenem and aspiration 10/22 grew proteus but improvement of late with decreased drainage and plans to discontinue the meropenem-         Jeff Calixto MD  Can be called via Teams  After 5pm/weekends [phone]

## 2022-11-03 LAB
ALBUMIN SERPL ELPH-MCNC: 2.9 G/DL — LOW (ref 3.3–5)
ALP SERPL-CCNC: 128 U/L — HIGH (ref 40–120)
ALT FLD-CCNC: 17 U/L — SIGNIFICANT CHANGE UP (ref 10–45)
ANION GAP SERPL CALC-SCNC: 9 MMOL/L — SIGNIFICANT CHANGE UP (ref 5–17)
AST SERPL-CCNC: 19 U/L — SIGNIFICANT CHANGE UP (ref 10–40)
BILIRUB SERPL-MCNC: 0.2 MG/DL — SIGNIFICANT CHANGE UP (ref 0.2–1.2)
BUN SERPL-MCNC: 20 MG/DL — SIGNIFICANT CHANGE UP (ref 7–23)
CALCIUM SERPL-MCNC: 9.2 MG/DL — SIGNIFICANT CHANGE UP (ref 8.4–10.5)
CHLORIDE SERPL-SCNC: 102 MMOL/L — SIGNIFICANT CHANGE UP (ref 96–108)
CO2 SERPL-SCNC: 28 MMOL/L — SIGNIFICANT CHANGE UP (ref 22–31)
CREAT SERPL-MCNC: 0.45 MG/DL — LOW (ref 0.5–1.3)
EGFR: 101 ML/MIN/1.73M2 — SIGNIFICANT CHANGE UP
GAS PNL BLDA: SIGNIFICANT CHANGE UP
GLUCOSE BLDC GLUCOMTR-MCNC: 138 MG/DL — HIGH (ref 70–99)
GLUCOSE BLDC GLUCOMTR-MCNC: 169 MG/DL — HIGH (ref 70–99)
GLUCOSE BLDC GLUCOMTR-MCNC: 228 MG/DL — HIGH (ref 70–99)
GLUCOSE BLDC GLUCOMTR-MCNC: 229 MG/DL — HIGH (ref 70–99)
GLUCOSE SERPL-MCNC: 139 MG/DL — HIGH (ref 70–99)
HCT VFR BLD CALC: 33.5 % — LOW (ref 34.5–45)
HGB BLD-MCNC: 9.9 G/DL — LOW (ref 11.5–15.5)
MAGNESIUM SERPL-MCNC: 1.9 MG/DL — SIGNIFICANT CHANGE UP (ref 1.6–2.6)
MCHC RBC-ENTMCNC: 23.2 PG — LOW (ref 27–34)
MCHC RBC-ENTMCNC: 29.6 GM/DL — LOW (ref 32–36)
MCV RBC AUTO: 78.6 FL — LOW (ref 80–100)
NRBC # BLD: 0 /100 WBCS — SIGNIFICANT CHANGE UP (ref 0–0)
PHOSPHATE SERPL-MCNC: 3.5 MG/DL — SIGNIFICANT CHANGE UP (ref 2.5–4.5)
PLATELET # BLD AUTO: 310 K/UL — SIGNIFICANT CHANGE UP (ref 150–400)
POTASSIUM SERPL-MCNC: 4.2 MMOL/L — SIGNIFICANT CHANGE UP (ref 3.5–5.3)
POTASSIUM SERPL-SCNC: 4.2 MMOL/L — SIGNIFICANT CHANGE UP (ref 3.5–5.3)
PROT SERPL-MCNC: 6.8 G/DL — SIGNIFICANT CHANGE UP (ref 6–8.3)
RBC # BLD: 4.26 M/UL — SIGNIFICANT CHANGE UP (ref 3.8–5.2)
RBC # FLD: 22.2 % — HIGH (ref 10.3–14.5)
SODIUM SERPL-SCNC: 139 MMOL/L — SIGNIFICANT CHANGE UP (ref 135–145)
VANCOMYCIN TROUGH SERPL-MCNC: 16.6 UG/ML — SIGNIFICANT CHANGE UP (ref 10–20)
WBC # BLD: 13.04 K/UL — HIGH (ref 3.8–10.5)
WBC # FLD AUTO: 13.04 K/UL — HIGH (ref 3.8–10.5)

## 2022-11-03 PROCEDURE — 99232 SBSQ HOSP IP/OBS MODERATE 35: CPT

## 2022-11-03 PROCEDURE — 71045 X-RAY EXAM CHEST 1 VIEW: CPT | Mod: 26

## 2022-11-03 RX ORDER — HUMAN INSULIN 100 [IU]/ML
44 INJECTION, SUSPENSION SUBCUTANEOUS
Refills: 0 | Status: DISCONTINUED | OUTPATIENT
Start: 2022-11-04 | End: 2022-11-04

## 2022-11-03 RX ORDER — MAGNESIUM SULFATE 500 MG/ML
2 VIAL (ML) INJECTION ONCE
Refills: 0 | Status: COMPLETED | OUTPATIENT
Start: 2022-11-03 | End: 2022-11-03

## 2022-11-03 RX ORDER — HUMAN INSULIN 100 [IU]/ML
8 INJECTION, SUSPENSION SUBCUTANEOUS
Refills: 0 | Status: DISCONTINUED | OUTPATIENT
Start: 2022-11-04 | End: 2022-11-04

## 2022-11-03 RX ADMIN — HUMAN INSULIN 42 UNIT(S): 100 INJECTION, SUSPENSION SUBCUTANEOUS at 06:26

## 2022-11-03 RX ADMIN — Medication 1 APPLICATION(S): at 06:24

## 2022-11-03 RX ADMIN — DORZOLAMIDE HYDROCHLORIDE 1 DROP(S): 20 SOLUTION/ DROPS OPHTHALMIC at 06:10

## 2022-11-03 RX ADMIN — Medication 1 TABLET(S): at 12:39

## 2022-11-03 RX ADMIN — Medication 0.5 MILLIGRAM(S): at 17:09

## 2022-11-03 RX ADMIN — Medication 4: at 18:24

## 2022-11-03 RX ADMIN — Medication 3 MILLILITER(S): at 05:19

## 2022-11-03 RX ADMIN — Medication 1 APPLICATION(S): at 21:57

## 2022-11-03 RX ADMIN — MAGNESIUM OXIDE 400 MG ORAL TABLET 400 MILLIGRAM(S): 241.3 TABLET ORAL at 06:08

## 2022-11-03 RX ADMIN — Medication 0.5 MILLIGRAM(S): at 05:19

## 2022-11-03 RX ADMIN — PANTOPRAZOLE SODIUM 40 MILLIGRAM(S): 20 TABLET, DELAYED RELEASE ORAL at 12:37

## 2022-11-03 RX ADMIN — Medication 4 MILLILITER(S): at 05:19

## 2022-11-03 RX ADMIN — MEXILETINE HYDROCHLORIDE 200 MILLIGRAM(S): 150 CAPSULE ORAL at 06:08

## 2022-11-03 RX ADMIN — HUMAN INSULIN 6 UNIT(S): 100 INJECTION, SUSPENSION SUBCUTANEOUS at 18:31

## 2022-11-03 RX ADMIN — DORZOLAMIDE HYDROCHLORIDE 1 DROP(S): 20 SOLUTION/ DROPS OPHTHALMIC at 17:06

## 2022-11-03 RX ADMIN — Medication 50 MILLIGRAM(S): at 21:33

## 2022-11-03 RX ADMIN — Medication 1 DROP(S): at 06:09

## 2022-11-03 RX ADMIN — Medication 25 GRAM(S): at 04:55

## 2022-11-03 RX ADMIN — Medication 500 MILLIGRAM(S): at 12:37

## 2022-11-03 RX ADMIN — DORZOLAMIDE HYDROCHLORIDE 1 DROP(S): 20 SOLUTION/ DROPS OPHTHALMIC at 21:35

## 2022-11-03 RX ADMIN — APIXABAN 5 MILLIGRAM(S): 2.5 TABLET, FILM COATED ORAL at 17:06

## 2022-11-03 RX ADMIN — Medication 50 MILLIGRAM(S): at 06:08

## 2022-11-03 RX ADMIN — Medication 3 MILLILITER(S): at 17:09

## 2022-11-03 RX ADMIN — Medication 1 APPLICATION(S): at 12:38

## 2022-11-03 RX ADMIN — Medication 5 MILLIGRAM(S): at 17:06

## 2022-11-03 RX ADMIN — Medication 4 MILLILITER(S): at 17:09

## 2022-11-03 RX ADMIN — MEXILETINE HYDROCHLORIDE 200 MILLIGRAM(S): 150 CAPSULE ORAL at 17:07

## 2022-11-03 RX ADMIN — Medication 8 MILLIGRAM(S): at 06:25

## 2022-11-03 RX ADMIN — Medication 1 DROP(S): at 17:07

## 2022-11-03 RX ADMIN — MAGNESIUM OXIDE 400 MG ORAL TABLET 400 MILLIGRAM(S): 241.3 TABLET ORAL at 17:06

## 2022-11-03 RX ADMIN — Medication 3 MILLILITER(S): at 23:37

## 2022-11-03 RX ADMIN — Medication 5 MILLIGRAM(S): at 21:33

## 2022-11-03 RX ADMIN — APIXABAN 5 MILLIGRAM(S): 2.5 TABLET, FILM COATED ORAL at 06:25

## 2022-11-03 RX ADMIN — Medication 250 MILLIGRAM(S): at 12:40

## 2022-11-03 RX ADMIN — FLUCONAZOLE 150 MILLIGRAM(S): 150 TABLET ORAL at 21:36

## 2022-11-03 RX ADMIN — Medication 1 APPLICATION(S): at 12:41

## 2022-11-03 RX ADMIN — HUMAN INSULIN 8 UNIT(S): 100 INJECTION, SUSPENSION SUBCUTANEOUS at 01:17

## 2022-11-03 RX ADMIN — CHLORHEXIDINE GLUCONATE 1 APPLICATION(S): 213 SOLUTION TOPICAL at 06:11

## 2022-11-03 RX ADMIN — Medication 5 MILLIGRAM(S): at 06:08

## 2022-11-03 RX ADMIN — MEXILETINE HYDROCHLORIDE 200 MILLIGRAM(S): 150 CAPSULE ORAL at 21:33

## 2022-11-03 NOTE — PROGRESS NOTE ADULT - SUBJECTIVE AND OBJECTIVE BOX
INFECTIOUS DISEASES FOLLOW UP-- Fouzia Marily  [phone]    This is a follow up note for this  72yFemale with  Dissection of thoracic aorta        ROS:  CONSTITUTIONAL:  No fever, good appetite  CARDIOVASCULAR:  No chest pain or palpitations  RESPIRATORY:  No dyspnea  GASTROINTESTINAL:  No nausea, vomiting, diarrhea, or abdominal pain  GENITOURINARY:  No dysuria  NEUROLOGIC:  No headache,     Allergies    aspirin (Short breath)  Avelox (Short breath; Pruritus)  cefepime (Anaphylaxis)  codeine (Short breath)  Dilaudid (Short breath)  iodine (Short breath; Swelling)  penicillin (Anaphylaxis)  shellfish (Anaphylaxis)  tetanus toxoid (Short breath)  Valium (Short breath)    Intolerances        ANTIBIOTICS/RELEVANT:  antimicrobials  fluconAZOLE IVPB 600 milliGRAM(s) IV Intermittent every 24 hours  vancomycin  IVPB 750 milliGRAM(s) IV Intermittent every 12 hours  vancomycin  IVPB        immunologic:    OTHER:  acetaminophen    Suspension .. 650 milliGRAM(s) Oral every 6 hours PRN  acetylcysteine 10%  Inhalation 4 milliLiter(s) Inhalation every 12 hours  albuterol/ipratropium for Nebulization 3 milliLiter(s) Nebulizer every 6 hours  apixaban 5 milliGRAM(s) Oral every 12 hours  ascorbic acid 500 milliGRAM(s) Oral daily  buDESOnide    Inhalation Suspension 0.5 milliGRAM(s) Inhalation every 12 hours  chlorhexidine 2% Cloths 1 Application(s) Topical <User Schedule>  collagenase Ointment 1 Application(s) Topical daily  diphenhydramine 2%/zinc acetate 0.1% Cream 1 Application(s) Topical every 8 hours  dorzolamide 2% Ophthalmic Solution 1 Drop(s) Left EYE three times a day  hydrALAZINE 50 milliGRAM(s) Oral every 8 hours  insulin lispro (ADMELOG) corrective regimen sliding scale   SubCutaneous every 6 hours  insulin NPH human recombinant 8 Unit(s) SubCutaneous <User Schedule>  insulin NPH human recombinant 6 Unit(s) SubCutaneous <User Schedule>  magnesium oxide 400 milliGRAM(s) Oral every 8 hours  methylPREDNISolone 8 milliGRAM(s) Oral daily  metoclopramide Injectable 5 milliGRAM(s) IV Push every 8 hours  mexiletine 200 milliGRAM(s) Oral every 8 hours  multivitamin 1 Tablet(s) Oral daily  pantoprazole  Injectable 40 milliGRAM(s) IV Push daily  sodium chloride 0.9%. 1000 milliLiter(s) IV Continuous <Continuous>  timolol 0.5% Solution 1 Drop(s) Left EYE two times a day      Objective:  Vital Signs Last 24 Hrs  T(C): 36.4 (03 Nov 2022 16:00), Max: 36.7 (03 Nov 2022 12:00)  T(F): 97.5 (03 Nov 2022 16:00), Max: 98.1 (03 Nov 2022 12:00)  HR: 87 (03 Nov 2022 21:16) (77 - 103)  BP: --  BP(mean): --  RR: 30 (03 Nov 2022 21:16) (21 - 55)  SpO2: 98% (03 Nov 2022 21:16) (93% - 100%)    Parameters below as of 03 Nov 2022 21:16  Patient On (Oxygen Delivery Method): tracheostomy collar    O2 Concentration (%): 40    PHYSICAL EXAM:  Constitutional:no acute distress  Eyes:EBER, EOMI  Ear/Nose/Throat: no oral lesions, trach collar	  Respiratory: clear BL  Cardiovascular: S1S2  Gastrointestinal:soft, (+) BS, no tenderness  Extremities:no e/e/c improving swelling right elbow, wound vac  No Lymphadenopathy  IV sites not inflammed.    LABS:                        9.9    13.04 )-----------( 310      ( 03 Nov 2022 01:12 )             33.5     11-03    139  |  102  |  20  ----------------------------<  139<H>  4.2   |  28  |  0.45<L>    Ca    9.2      03 Nov 2022 01:12  Phos  3.5     11-03  Mg     1.9     11-03    TPro  6.8  /  Alb  2.9<L>  /  TBili  0.2  /  DBili  x   /  AST  19  /  ALT  17  /  AlkPhos  128<H>  11-03          MICROBIOLOGY:    COVID-19 PCR: NotDetec: You can help in the fight against COVID-19. [facility] may contact  you to see if you are interested in voluntarily participating in one of  our clinical trials.  Testing is performed using polymerase chain reaction (PCR) or  transcription mediated amplification (TMA). This COVID-19 (SARS-CoV-2)  nucleic acid amplification test was validated by [facility] and is  in use under the FDA Emergency Use Authorization (EUA) for clinical labs  CLIA-certified to perform high complexity testing. Test results should be  correlated with clinical presentation, patient history, and epidemiology. (11.01.22 @ 07:53)            RECENT CULTURES:      RADIOLOGY & ADDITIONAL STUDIES:

## 2022-11-03 NOTE — PROGRESS NOTE ADULT - SUBJECTIVE AND OBJECTIVE BOX
Patient seen and examined at the bedside.    Remained critically ill on continuous ICU monitoring.    OBJECTIVE:  Vital Signs Last 24 Hrs  T(C): 36.3 (02 Nov 2022 20:00), Max: 36.7 (02 Nov 2022 16:00)  T(F): 97.4 (02 Nov 2022 20:00), Max: 98 (02 Nov 2022 16:00)  HR: 77 (03 Nov 2022 05:36) (77 - 96)  BP: --  BP(mean): --  RR: 27 (03 Nov 2022 05:00) (16 - 30)  SpO2: 99% (03 Nov 2022 05:36) (97% - 100%)    Parameters below as of 03 Nov 2022 05:36  Patient On (Oxygen Delivery Method): tracheostomy collar    Physical Exam:   General: Trach collar, NAD  Neurology: Nonfocal, responds to commands. Moves all extremities  ENT/Neck: + trach c/d/i, neck supple, trachea midline   Respiratory: coarse BS b/l, rhonchi throughout, minimal secretions present  CV: S1S2, no murmurs        [x] Sinus Rhythm   Abdominal: Soft, NT, ND, colostomy in place (stoma intact)  Extremities: nonpitting edema, improved. Right elbow wound vac c/d/i. warm and well-perfused.  Skin: Dry dressing over right heel. No cyanosis               Assessment:  71F PMH DM, COPD, chronic adrenal insufficiency on Prednisone, history of colorectal cancer s/p resection (colostomy bag), Hx of CAD, chronic AF on Eliquis, and tracheomalacia s/p multiple intubations, recent dx of R foot OM s/p debridement on antibiotics and presented to ED at [facility] this morning with epigastric pain, belching and central chest pain. Found on CTA to have type A aortic dissection and transferred to [facility] for surgical evaluation by Dr. Cabrera.     Ascending aortic dissection s/p type A dissection repair and Biobentall on 9/7   Respiratory failure s/p Trach 10/10/22  Hypovolemia   Post op respiratory failure   Acute blood loss anemia  Stress hyperglycemia   RIJ thrombus  MRSA PNA    Plan:   ***Neuro***  [x] Nonfocal  follows simple commands, continue to monitor  PT/OT progress as tolerated, ambulated with physical therapy    ***Cardiovascular***  Limited TTE on 10/1: EF 69%, Hyperdynamic left ventricular systolic function. There is hypokinesis of the mid-base inferior wall. Nml RV fxn.   CT chest on 9/28: No CT evidence of pulmonary embolism. Status post repair of ascending aortic dissection with a stable dissection flap originating from the aortic arch and extending into the infrarenal abdominal aorta,  B/l UE duplex on 10/5: As before, there is an occluded RIGHT IJ vein with thrombosis extending to brachiocephalic vein. Superficial thrombophlebitis of the LEFT cephalic vein.   Invasive hemodynamic monitoring, assess perfusion indices   SR / MAP 71 / Hct 33.5% / Lactate 1.2  Continuos reassessment of hemodynamics   Hydralazine PO for BP management   Rate control with Mexiletine   WVAC on rt elbow changed yesterday  [x] AC Therapy with Eliquis 5 BID for Afib and IJ thrombus    ***Pulmonary***  Trach collar 10L/40% since 10/25, continue  Respiratory failure s/p Trach #8 portex  10/10/22, per ENT inner cannula needs to be changed daily, trach sutures d/c'ed by ENT 10/17 , 40% trach collar for 2 days  Titration of FiO2, follow SpO2, CXR, blood gases   Bronched 10/13  Continue duonebs  Suction q2hrly as needed   Aggressive Pulmonary toilet    ***GI***  [x] Diet: TFs Glucerna 1.2 @ goal 65ml/hr, tolerating without issues (when glucerna 1.5 back in stock will switch and decrease goal rate to 55ml/hr)  [x] Protonix    Reglan for gut motility     ***Renal***  Continue to monitor I/Os, BUN/Creatinine.   Replete lytes PRN    ***ID***  SCx on 10/4+Methicillin resistant Staphylococcus aureus, c/w IVPB Vancomycin, (plan for 6 week course in setting of valve surgery, 11/26 end date)  9/27 blood culture Neela Glabarata, on flucanazole (lifelong suppression)  BCx 10/13 NGTD, BC 10/21 and SCx NGTD  R elbow wound, S/p IR for elbow drainage 10/13 + Few Proteus mirabilis ESBL, Meropenem 7 day trial completed 10/19-> reordered 10/20 for reaccumulation of elbow bursitis-  10/22 plastics debrided R elbow eschar to subc tissue, ~8cc fluid aspirated and sent for culture. Wet to dry dressing changed 2x daily. PT changed vac today   Joint Fluid Cx 10/22 NTD  Meropenem for 8 day course (until 10/28),started in setting of elbow collection 10/22,Course completed today.    ***Endocrine***  [x]  DM : HbA1c 9.4%                - [x] ISS  [x] NPH              - Need tight glycemic control to prevent wound infection.  Endocrine following, appreciate recommendations        Patient requires continuous monitoring with bedside rhythm monitoring, pulse oximetry monitoring, and continuous central venous and arterial pressure monitoring; and intermittent blood gas analysis. Care plan discussed with the ICU care team.   Patient remained critical, at risk for life threatening decompensation.    I have spent 30 minutes providing critical care management to this patient.    By signing my name below, I, Bharti Barrios, attest that this documentation has been prepared under the direction and in the presence of KIANA Briseno.  Electronically signed: Georgi Gottlieb, 11-03-22 @ 06:27    I, Humza Samuels, personally performed the services described in this documentation. all medical record entries made by the scribe were at my direction and in my presence. I have reviewed the chart and agree that the record reflects my personal performance and is accurate and complete  Electronically signed: KIANA Briseno. Patient seen and examined at the bedside.    Remained critically ill on continuous ICU monitoring.    OBJECTIVE:  Vital Signs Last 24 Hrs  T(C): 36.3 (02 Nov 2022 20:00), Max: 36.7 (02 Nov 2022 16:00)  T(F): 97.4 (02 Nov 2022 20:00), Max: 98 (02 Nov 2022 16:00)  HR: 77 (03 Nov 2022 05:36) (77 - 96)  BP: --  BP(mean): --  RR: 27 (03 Nov 2022 05:00) (16 - 30)  SpO2: 99% (03 Nov 2022 05:36) (97% - 100%)    Parameters below as of 03 Nov 2022 05:36  Patient On (Oxygen Delivery Method): tracheostomy collar    Physical Exam:   General: Trach collar, NAD  Neurology: Nonfocal, responds to commands. Moves all extremities  ENT/Neck: + trach c/d/i, neck supple, trachea midline   Respiratory: coarse BS b/l, rhonchi throughout, minimal secretions present  CV: S1S2, no murmurs        [x] Sinus Rhythm   Abdominal: Soft, NT, ND, colostomy in place (stoma intact)  Extremities: nonpitting edema, improved. Right elbow wound vac c/d/i. warm and well-perfused.  Skin: Dry dressing over right heel. No cyanosis               Assessment:  71F PMH DM, COPD, chronic adrenal insufficiency on Prednisone, history of colorectal cancer s/p resection (colostomy bag), Hx of CAD, chronic AF on Eliquis, and tracheomalacia s/p multiple intubations, recent dx of R foot OM s/p debridement on antibiotics and presented to ED at [facility] this morning with epigastric pain, belching and central chest pain. Found on CTA to have type A aortic dissection and transferred to [facility] for surgical evaluation by Dr. Cabrera.     Ascending aortic dissection s/p type A dissection repair and Biobentall on 9/7   Respiratory failure s/p Trach 10/10/22  Hypovolemia   Post op respiratory failure   Acute blood loss anemia  Stress hyperglycemia   RIJ thrombus  MRSA PNA    Today's Events: Laryngoscopy by ENT for hoarseness. They also downsized trach to 7 cuffless today which she is tolerating. Ambulation.     Plan:   ***Neuro***  [x] Nonfocal  follows simple commands, continue to monitor  PT/OT progress as tolerated, ambulated with physical therapy    ***Cardiovascular***  Limited TTE on 10/1: EF 69%, Hyperdynamic left ventricular systolic function. There is hypokinesis of the mid-base inferior wall. Nml RV fxn.   CT chest on 9/28: No CT evidence of pulmonary embolism. Status post repair of ascending aortic dissection with a stable dissection flap originating from the aortic arch and extending into the infrarenal abdominal aorta,  B/l UE duplex on 10/5: As before, there is an occluded RIGHT IJ vein with thrombosis extending to brachiocephalic vein. Superficial thrombophlebitis of the LEFT cephalic vein.   Invasive hemodynamic monitoring, assess perfusion indices   SR / MAP 71 / Hct 33.5% / Lactate 1.2  Continuos reassessment of hemodynamics   Hydralazine PO for BP management   Rate control with Mexiletine   WVAC on rt elbow changed yesterday  [x] AC Therapy with Eliquis 5 BID for Afib and IJ thrombus    ***Pulmonary***  Trach collar 10L/40% since 10/25, continue  Respiratory failure s/p Trach #8 portex  10/10/22, per ENT inner cannula needs to be changed daily, trach sutures d/c'ed by ENT 10/17 , 40% trach collar for 2 days  Titration of FiO2, follow SpO2, CXR, blood gases   Bronched 10/13  Continue duonebs  Suction q2hrly as needed   Aggressive Pulmonary toilet  Downsized to 7 cuffless trach     ***GI***  [x] Diet: TFs Glucerna 1.2 @ goal 65ml/hr, tolerating without issues (when glucerna 1.5 back in stock will switch and decrease goal rate to 55ml/hr)  [x] Protonix    Reglan for gut motility     ***Renal***  Continue to monitor I/Os, BUN/Creatinine.   Replete lytes PRN    ***ID***  SCx on 10/4+Methicillin resistant Staphylococcus aureus, c/w IVPB Vancomycin, (plan for 6 week course in setting of valve surgery, 11/26 end date)  9/27 blood culture Neela Glabarata, on flucanazole (lifelong suppression)  BCx 10/13 NGTD, BC 10/21 and SCx NGTD  R elbow wound, S/p IR for elbow drainage 10/13 + Few Proteus mirabilis ESBL, Meropenem 7 day trial completed 10/19-> reordered 10/20 for reaccumulation of elbow bursitis-  10/22 plastics debrided R elbow eschar to subc tissue, ~8cc fluid aspirated and sent for culture. Wet to dry dressing changed 2x daily. PT changed vac today   Joint Fluid Cx 10/22 NTD  Meropenem for 8 day course (until 10/28),started in setting of elbow collection 10/22,Course completed today.    ***Endocrine***  [x]  DM : HbA1c 9.4%                - [x] ISS  [x] NPH              - Need tight glycemic control to prevent wound infection.  Endocrine following, appreciate recommendations        Patient requires continuous monitoring with bedside rhythm monitoring, pulse oximetry monitoring, and continuous central venous and arterial pressure monitoring; and intermittent blood gas analysis. Care plan discussed with the ICU care team.   Patient remained critical, at risk for life threatening decompensation.    I have spent 30 minutes providing critical care management to this patient.    By signing my name below, I, Bharti Barrios, attest that this documentation has been prepared under the direction and in the presence of KIANA Briseno.  Electronically signed: Georgi Gottlieb, 11-03-22 @ 06:27    I, Humza Samuels, personally performed the services described in this documentation. all medical record entries made by the scribe were at my direction and in my presence. I have reviewed the chart and agree that the record reflects my personal performance and is accurate and complete  Electronically signed: KIANA Briseno.

## 2022-11-03 NOTE — PROGRESS NOTE ADULT - SUBJECTIVE AND OBJECTIVE BOX
seen earlier today     Chief Complaint: Type 2 Diabetes Mellitus     INTERVAL HX: pt sleeping at time of visit awoke to answer questions and went back to sleep, requested RN at bedside to check BG to ensure no hypoglycemia as pt appeared somnolent- BG above goal 229 no hypoglycemia. Per discussion with RN TF have continued as ordered. Pt nodding head to answer questions. AC Lunch remains above goal, noted dinner BG tightly controlled 84 however received full nph dose, per order recommend if BG <100 obtain order for 50% of the NPH dose x1. MN BG at goal       Review of Systems:  General: As above  Cardiovascular: No chest pain  Respiratory: No SOB  GI: No nausea, vomiting  Endocrine: no  S&Sx of hypoglycemia    Allergies    aspirin (Short breath)  Avelox (Short breath; Pruritus)  cefepime (Anaphylaxis)  codeine (Short breath)  Dilaudid (Short breath)  iodine (Short breath; Swelling)  penicillin (Anaphylaxis)  shellfish (Anaphylaxis)  tetanus toxoid (Short breath)  Valium (Short breath)    Intolerances      MEDICATIONS  (STANDING):  acetylcysteine 10%  Inhalation 4 milliLiter(s) Inhalation every 12 hours  albuterol/ipratropium for Nebulization 3 milliLiter(s) Nebulizer every 6 hours  apixaban 5 milliGRAM(s) Oral every 12 hours  ascorbic acid 500 milliGRAM(s) Oral daily  buDESOnide    Inhalation Suspension 0.5 milliGRAM(s) Inhalation every 12 hours  chlorhexidine 2% Cloths 1 Application(s) Topical <User Schedule>  collagenase Ointment 1 Application(s) Topical daily  dextrose 50% Injectable 50 milliLiter(s) IV Push every 15 minutes  diphenhydramine 2%/zinc acetate 0.1% Cream 1 Application(s) Topical every 8 hours  dorzolamide 2% Ophthalmic Solution 1 Drop(s) Left EYE three times a day  fluconAZOLE IVPB 600 milliGRAM(s) IV Intermittent every 24 hours  hydrALAZINE 50 milliGRAM(s) Oral every 8 hours  insulin lispro (ADMELOG) corrective regimen sliding scale   SubCutaneous every 6 hours  insulin NPH human recombinant 8 Unit(s) SubCutaneous <User Schedule>  insulin NPH human recombinant 6 Unit(s) SubCutaneous <User Schedule>  magnesium oxide 400 milliGRAM(s) Oral every 8 hours  methylPREDNISolone 8 milliGRAM(s) Oral daily  metoclopramide Injectable 5 milliGRAM(s) IV Push every 8 hours  mexiletine 200 milliGRAM(s) Oral every 8 hours  multivitamin 1 Tablet(s) Oral daily  pantoprazole  Injectable 40 milliGRAM(s) IV Push daily  sodium chloride 0.9%. 1000 milliLiter(s) (10 mL/Hr) IV Continuous <Continuous>  timolol 0.5% Solution 1 Drop(s) Left EYE two times a day  vancomycin  IVPB 750 milliGRAM(s) IV Intermittent every 12 hours  vancomycin  IVPB            insulin lispro (ADMELOG) corrective regimen sliding scale   4 Unit(s) SubCutaneous (11-03-22 @ 11:24)    insulin NPH human recombinant   10 Unit(s) SubCutaneous (11-03-22 @ 11:24)    insulin NPH human recombinant   42 Unit(s) SubCutaneous (11-03-22 @ 06:26)    insulin NPH human recombinant   8 Unit(s) SubCutaneous (11-03-22 @ 01:17)    insulin NPH human recombinant   6 Unit(s) SubCutaneous (11-02-22 @ 18:45)    methylPREDNISolone   8 milliGRAM(s) Oral (11-03-22 @ 06:25)        PHYSICAL EXAM:  VITALS: T(C): 36.7 (11-03-22 @ 12:00)  T(F): 98.1 (11-03-22 @ 12:00), Max: 98.1 (11-03-22 @ 12:00)  HR: 95 (11-03-22 @ 12:00) (77 - 103)  BP: --  RR:  (20 - 55)  SpO2:  (94% - 100%)  Wt(kg): --  GENERAL: female sitting in chair in NAD; wound vac, NGT with glucerna at 65cc/hr  Respiratory: Respirations unlabored trach with trach collar   Extremities: Warm, no edema  NEURO: somnolent      LABS:    POCT Blood Glucose.: 229 mg/dL (11-03-22 @ 10:39)  POCT Blood Glucose.: 169 mg/dL (11-03-22 @ 06:20)  POCT Blood Glucose.: 138 mg/dL (11-03-22 @ 00:35)  POCT Blood Glucose.: 84 mg/dL (11-02-22 @ 18:12)  POCT Blood Glucose.: 207 mg/dL (11-02-22 @ 12:41)  POCT Blood Glucose.: 252 mg/dL (11-02-22 @ 08:33)  POCT Blood Glucose.: 169 mg/dL (11-02-22 @ 06:40)  POCT Blood Glucose.: 131 mg/dL (11-01-22 @ 21:25)  POCT Blood Glucose.: 67 mg/dL (11-01-22 @ 20:02)  POCT Blood Glucose.: 66 mg/dL (11-01-22 @ 20:00)  POCT Blood Glucose.: 156 mg/dL (11-01-22 @ 17:43)  POCT Blood Glucose.: 287 mg/dL (11-01-22 @ 15:38)  POCT Blood Glucose.: 321 mg/dL (11-01-22 @ 14:12)  POCT Blood Glucose.: 102 mg/dL (11-01-22 @ 06:01)  POCT Blood Glucose.: 143 mg/dL (10-31-22 @ 18:21)  POCT Blood Glucose.: 113 mg/dL (10-31-22 @ 16:03)                            9.9    13.04 )-----------( 310      ( 03 Nov 2022 01:12 )             33.5       11-03    139  |  102  |  20  ----------------------------<  139<H>  4.2   |  28  |  0.45<L>    Ca    9.2      03 Nov 2022 01:12  Phos  3.5     11-03  Mg     1.9     11-03    TPro  6.8  /  Alb  2.9<L>  /  TBili  0.2  /  DBili  x   /  AST  19  /  ALT  17  /  AlkPhos  128<H>  11-03      eGFR: 101 mL/min/1.73m2 (03 Nov 2022 01:12)          Thyroid Function Tests:          A1C with Estimated Average Glucose Result: 9.4 % (09-06-22 @ 16:46)      Estimated Average Glucose: 223 mg/dL (09-06-22 @ 16:46)

## 2022-11-03 NOTE — PROGRESS NOTE ADULT - TIME BILLING
as above: await ENT f/up for awake laryngoscopy  -ID f/up-resp stable--wound care f/up-TC continues- (she will always have secretions) due to TBM-s/p  trach 10/10-s/p  resp failure-s/p [id number] ABX-stable CV koikpf-wx-tsoal VEST rx   multifactorial dyspnea-resp failure-severe persistent asthma, TBM s/p tracheoplasty, s/p Aortic aneurysm repair, Bronchitis (proteus)-O2-keep 90%;TC  severe persistent asthma--medrol 8mg, singulair 10, duoneb q 6, budes .5 bid, tezspire 8/29-was due 9/29  TBM-s/p tracheoplasty--accapella, vest rx, mucomyst here 2 cc 10% q 8 (secretions)  s/p Aortic aneurysm repair--as per CTS staff care  AF-on eliquis rx               CV-improved hydralazine/lopressor/mexilitine   DVT R-IJ-on heparin rx  *****ID-s/p proteus bronchitis-s/p meropenum changed to ceftriaxone and back to meropenem/vanco- off of ABX as of 9/19--restart of ABX 9/27-meropenem/vanco-NOW on meropenem and vanco for MRSE sepsis (11/15 end date for vanco), plastics/ortho for elbow-proteus (?wash out)-vac in place-suppressive rx-bact/fluconazole  PT-OOB as able                             GI-TF as able--f/up LFTs-normal       ****ORTHO f/up--? additional wash out of elbow wound?; wound care f/up  **VC dysfunction--aspiration precautions-s/p trach 10/10-ENT f/up for awake laryngoscopy-? decannulation    Heme onc f/up colon ca  prog--critical in CTU                PT-to continue-more OOB     DC planning    Eulalio Allison MD-Pulmonary   [phone]

## 2022-11-03 NOTE — PROGRESS NOTE ADULT - SUBJECTIVE AND OBJECTIVE BOX
CHIEF COMPLAINT: f/up sob, chronic resp failure, TBM, severe persistent asthma, VC dysfunction, Type A aortic dissection s/p repair w/modified "Bentall procedure and hemiarch replacement 9/6-trached--on TC-feels well, good sleep, minimal secretions    Interval Events: none    REVIEW OF SYSTEMS:  Constitutional: No fevers or chills. No weight loss. No fatigue or generalized malaise.  Eyes: No itching or discharge from the eyes  ENT: No ear pain. No ear discharge. No nasal congestion. No post nasal drip. No epistaxis. No throat pain. No sore throat. No difficulty swallowing.   CV: No chest pain. No palpitations. No lightheadedness or dizziness.   Resp: No dyspnea at rest. No dyspnea on exertion. No orthopnea. No wheezing. + cough. No stridor. No sputum production. No chest pain with respiration.  GI: No nausea. No vomiting. No diarrhea.  MSK: No joint pain or pain in any extremities  Integumentary: No skin lesions. No pedal edema.  Neurological: + gross motor weakness. No sensory changes.  [ +] All other systems negative  [ ] Unable to assess ROS because ________    OBJECTIVE:  ICU Vital Signs Last 24 Hrs  T(C): 36.3 (02 Nov 2022 20:00), Max: 36.7 (02 Nov 2022 16:00)  T(F): 97.4 (02 Nov 2022 20:00), Max: 98 (02 Nov 2022 16:00)  HR: 77 (03 Nov 2022 05:36) (72 - 96)  BP: --  BP(mean): --  ABP: 132/51 (03 Nov 2022 05:00) (123/48 - 165/73)  ABP(mean): 71 (03 Nov 2022 05:00) (66 - 101)  RR: 27 (03 Nov 2022 05:00) (16 - 30)  SpO2: 99% (03 Nov 2022 05:36) (97% - 100%)    O2 Parameters below as of 03 Nov 2022 05:36  Patient On (Oxygen Delivery Method): tracheostomy collar              11-01 @ 07:01  -  11-02 @ 07:00  --------------------------------------------------------  IN: 2455 mL / OUT: 1400 mL / NET: 1055 mL    11-02 @ 07:01  - 11-03 @ 05:53  --------------------------------------------------------  IN: 2775 mL / OUT: 975 mL / NET: 1800 mL      CAPILLARY BLOOD GLUCOSE      POCT Blood Glucose.: 138 mg/dL (03 Nov 2022 00:35)      PHYSICAL EXAM: in bed on TC  General: Awake, alert, oriented X 3.   HEENT: Atraumatic, normocephalic.                 Mallampatti Grade 3                No nasal congestion.                No tonsillar or pharyngeal exudates.  Lymph Nodes: No palpable lymphadenopathy  Neck: No JVD. No carotid bruit.   Respiratory: abnormal chest expansion                         Normal percussion                         Normal and equal air entry                         No wheeze, rhonchi or rales.  Cardiovascular: S1 S2 normal. No murmurs, rubs or gallops.   Abdomen: Soft, non-tender, non-distended. No organomegaly. Normoactive bowel sounds.  Extremities: Warm to touch. Peripheral pulse palpable. No pedal edema.   Skin: No rashes or skin lesions  Neurological: Motor and sensory examination equal and normal in all four extremities.  Psychiatry: Appropriate mood and affect.    HOSPITAL MEDICATIONS:  MEDICATIONS  (STANDING):  acetylcysteine 10%  Inhalation 4 milliLiter(s) Inhalation every 12 hours  albuterol/ipratropium for Nebulization 3 milliLiter(s) Nebulizer every 6 hours  apixaban 5 milliGRAM(s) Oral every 12 hours  ascorbic acid 500 milliGRAM(s) Oral daily  buDESOnide    Inhalation Suspension 0.5 milliGRAM(s) Inhalation every 12 hours  chlorhexidine 2% Cloths 1 Application(s) Topical <User Schedule>  collagenase Ointment 1 Application(s) Topical daily  dextrose 50% Injectable 50 milliLiter(s) IV Push every 15 minutes  diphenhydramine 2%/zinc acetate 0.1% Cream 1 Application(s) Topical every 8 hours  dorzolamide 2% Ophthalmic Solution 1 Drop(s) Left EYE three times a day  fluconAZOLE IVPB 600 milliGRAM(s) IV Intermittent every 24 hours  hydrALAZINE 50 milliGRAM(s) Oral every 8 hours  insulin lispro (ADMELOG) corrective regimen sliding scale   SubCutaneous every 6 hours  insulin NPH human recombinant 10 Unit(s) SubCutaneous <User Schedule>  insulin NPH human recombinant 42 Unit(s) SubCutaneous <User Schedule>  insulin NPH human recombinant 8 Unit(s) SubCutaneous <User Schedule>  insulin NPH human recombinant 6 Unit(s) SubCutaneous <User Schedule>  magnesium oxide 400 milliGRAM(s) Oral every 8 hours  methylPREDNISolone 8 milliGRAM(s) Oral daily  metoclopramide Injectable 5 milliGRAM(s) IV Push every 8 hours  mexiletine 200 milliGRAM(s) Oral every 8 hours  multivitamin 1 Tablet(s) Oral daily  pantoprazole  Injectable 40 milliGRAM(s) IV Push daily  sodium chloride 0.9%. 1000 milliLiter(s) (10 mL/Hr) IV Continuous <Continuous>  timolol 0.5% Solution 1 Drop(s) Left EYE two times a day  vancomycin  IVPB 750 milliGRAM(s) IV Intermittent every 12 hours  vancomycin  IVPB        MEDICATIONS  (PRN):  acetaminophen    Suspension .. 650 milliGRAM(s) Oral every 6 hours PRN Temp greater or equal to 38C (100.4F), Mild Pain (1 - 3)      LABS:                        9.9    13.04 )-----------( 310      ( 03 Nov 2022 01:12 )             33.5     11-03    139  |  102  |  20  ----------------------------<  139<H>  4.2   |  28  |  0.45<L>    Ca    9.2      03 Nov 2022 01:12  Phos  3.5     11-03  Mg     1.9     11-03    TPro  6.8  /  Alb  2.9<L>  /  TBili  0.2  /  DBili  x   /  AST  19  /  ALT  17  /  AlkPhos  128<H>  11-03        Arterial Blood Gas:  11-03 @ 00:40  7.43/45/92/30/98.7/4.9  ABG lactate: --  Arterial Blood Gas:  11-02 @ 00:40  7.43/46/99/30/98.8/5.5  ABG lactate: --  Arterial Blood Gas:  11-01 @ 15:42  7.39/49/83/30/96.8/4.0  ABG lactate: --  Arterial Blood Gas:  11-01 @ 14:10  7.43/43/133/28/99.2/3.7  ABG lactate: --        MICROBIOLOGY:     RADIOLOGY:  [ ] Reviewed and interpreted by me    Point of Care Ultrasound Findings:    PFT:    EKG:

## 2022-11-03 NOTE — PROGRESS NOTE ADULT - ASSESSMENT
71 year-old female with a history of DM2, CAD, A-Fib on apixaban, Severe Persistent Asthma (on chronic steroids, recently started on Tezspire), colon cancer s/p resection/chemo, and tracheobronchomalacia s/p tracheoplasty, and recent OM of the R foot s/b debridement and completed course of Vancomycin/Ertapenem for MRSA/ESBL Proteus/Corynebacterium who now presents with chest pain, found to have Type A dissection s/p repair with modified Bentall procedure and hemiarch replacement on 22. Post-op course complicated by acute hypoxemic respiratory failure, shock, anemia, and hyperglycemia requiring insulin gtt. Extubated , now awake and alert with mild encephalopathy but overall significantly improved.    Assessment:  Acute hypoxemic respiratory failure requiring intubation  Type A Aortic Dissection  Severe Persistent Asthma  History of Tracheobronchomalacia    Plan:  See below:  -**************************                                SEE Below:  -improving but weakness  9/15-ABX adjusted to ceftriaxone (LFTS)-PICU  -PICU, low grade temp-fever work up in progress, no resp decline or sx  -resp stable at present but tenuous  -for FEESST today, NGT in place w/TF  -s/p FEESST-passed, remains in PICU          -TF in place at 40cc, more OOB          -hypoglycemia noted and rx, no change in resp status  -vented/sedated-pressors/inotropes/ABX  -remains vented on nitrous/less O2 needs, improving LFTS                   -vented/semi sedated--less O2 needs  10/3-on vanco, weaning sedation/, all lines changed  10/6-no changes-now afebrile-on vanco   10/7-no weaning-remains off pressors  10/10-for trach, no weaning done               10/11-s/p trach-uneventful  10/12-TC and ;cpap done and tolerated well  10/13-weaning TC continues              10/14-mucus issues-TC continues  10/24-TC x mult days-stable-secretion issues remain           10/25-replaced on vent for secretions            10/26-TC in place, mult consults  10/27-no changes-TC continues    10/28-wound care-vac in place  10/31-TC in place, elbow wound vac in place  -resp stable, elbow vac changed--ABX as per ID     -no new issues over the day  11/3-no new events-on TC/OOB as able; ENT f/up for awake laryngoscopy

## 2022-11-03 NOTE — PROGRESS NOTE ADULT - ASSESSMENT
71 year-old female with a history of DM2, CAD, A-Fib on apixaban, Severe Persistent Asthma (on chronic steroids, recently started on Tezspire), colon cancer s/p resection/chemo, and tracheobronchomalacia s/p tracheoplasty, and recent OM of the R foot s/b debridement and completed course of Vancomycin/Ertapenem for MRSA/ESBL Proteus/Corynebacterium who now presents with chest pain, found to have Type A dissection s/p repair with modified Bentall procedure and hemiarch replacement on 9/6/22. Post-op course complicated by acute hypoxemic respiratory failure, shock, anemia, and hyperglycemia requiring insulin gtt. Extubated 9/13, now awake and alert with mild encephalopathy but overall significantly improved.    Assessment:  Acute hypoxemic respiratory failure requiring intubation  Type A Aortic Dissection  Severe Persistent Asthma  History of Tracheobronchomalacia  fevers and MSSA bacteremia and tracheal aspirate material with MSSA    -leukocytosis  Fevers and MRSA bacteremia last + culture 9/28  last positive Candida blood culture 9/30  Continue IV vancomycin- trough remains therapeutic  Right elbow growing ESBL Proteus -continue Meropenem and she may need further drainage or a washout to the joint given persistent positive cultures and leukocytosis, with next wound vac change would send a repeat photo and culture of the elbow drainage        MRSA  bacteremia and candidemia  Will plan to treat for 6 weeks in the setting of post surgical repair of thoracic aorta dissection through 11/15/22 after which she may need chronic oral suppression with Bactrim plus fluconazole  Continue to follow CBC  Continue to follow creatinine  Continue to follow Vanco trough levels Vancomycin Level, Trough: 11.2:   repeat blood cultures for evidence of fungemia and bacteremia clearance show evidence of clearing of infections and once IV antibiotics are completed will discuss oral suppression options  ESBL proteus in elbow area fluid sensitive to meropenem and aspiration 10/22 grew proteus (reportedly superficial and not involving bursa or joint)  completed meropenem        Jeff Calixto MD  Can be called via Teams  After 5pm/weekends [phone]

## 2022-11-03 NOTE — PROGRESS NOTE ADULT - ASSESSMENT
72 F w/h/o uncontrolled T2DM (A1C 9.4%) on basal/bolus insulin PTA. Unknown DM complications. Also COPD, secondary adrenal Insufficiency on chronic steroids, colorectal cancer s/p resection (colostomy bag), Chronic A fib on Eliquis, and tracheomalacia and multiple intubations, recent dx of OM presented to ED at [facility] with epigastric pain, belching and central chest pain. Found on CTA to have type A aortic dissection and transferred to [facility] for surgical evaluation by Dr. Cabrera. Now s/p aortic dissection repair on 9/07/22. Endocrine consulted for assistance with uncontrolled DM/steroids for AI. Pt in ICU with ventricular dysfunction/sepsis, and now s/p trach 10/10 and more recently s/p R elbow eschar debridement 10/22 > Wound VAC 10/24.  On Prednisone dose 8mg for AI. Tolerating TF @ goal rate w/hyperglycemia around noon daily  due to steroid action. BG goal (100-180mg/dl).        Type 2 diabetes mellitus with hyperglycemia.   - BG Goal 100-180mg/dl    -test BG q6h if NPO/TF   - continue to optimize NPH regimen  Adjust NPH regimen to:      00:00 NPH 8 units      06:00 NPH 44 units      12:00 NPH 8 units       18:00 NPH 6units  If BG <100 can administer 50% of the dose. HOLD IF TFS OFF  -C/w Admelog moderate correction scale q6h for now  - May need to further titrate down later day doses as 6am dose titrated up.   -c/w Admelog moderate correction scale Q6h  -notify endocrine team if any change to diet/TF regimen  Discharge plan:  - Likely to discharge patient on basal/bolus insulin. Final regimen pending clinical course.  - Recommend routine outpatient ophthalmology, podiatry  - Can f/u with endocrinologist Dr. Saavedra.  - Make sure pt has Rx for all DM supplies and insulin/ DM meds.  -Based on pt's clinical condition and mental status at this time she is not able to independently manage her DM. Will evaluate pt's ability to manage DM at time of discharge. If unable> pt will need family/ care giver (s) to manage DM care at home       Problem/Plan - 2:  ·  Problem: Adrenal insufficiency.   ·  Plan: On chronic steroids at home: medrol 8mg qd   C/w Prednisone 8mg qd   Will need stress steroids if acutely ill.     Problem/Plan - 3:  ·  Problem: Hyperlipidemia.   ·  Plan: Off rosuvastatin 5mg daily  Re start if not contraindicated and as per cardiology  -F/u levels as out pt.    discussed with pt and RN   Contact via [employer] Teams during business hours  On evenings and weekends (or if no response on [employer] Teams) please call [phone] or page endocrine fellow on call.   Email: [email or web address]   Please note that this patient may be followed by different provider tomorrow.    greater than 50% of the encounter was spent counseling and/or coordination of care.  26 minutes spent on total encounter; The necessity of the time spent during the encounter on this date of service was due to development of plan of care/coordination of care/glycemic control through review of labs, blood glucose values and vital signs.  72 F w/h/o uncontrolled T2DM (A1C 9.4%) on basal/bolus insulin PTA. Unknown DM complications. Also COPD, secondary adrenal Insufficiency on chronic steroids, colorectal cancer s/p resection (colostomy bag), Chronic A fib on Eliquis, and tracheomalacia and multiple intubations, recent dx of OM presented to ED at [facility] with epigastric pain, belching and central chest pain. Found on CTA to have type A aortic dissection and transferred to [facility] for surgical evaluation by Dr. Cabrera. Now s/p aortic dissection repair on 9/07/22. Endocrine consulted for assistance with uncontrolled DM/steroids for AI. Pt in ICU with ventricular dysfunction/sepsis, and now s/p trach 10/10 and more recently s/p R elbow eschar debridement 10/22 > Wound VAC 10/24.  On Prednisone dose 8mg for AI. Tolerating TF @ goal rate w/hyperglycemia around noon daily  due to steroid action. BG goal (100-180mg/dl).        Type 2 diabetes mellitus with hyperglycemia.   - BG Goal 100-180mg/dl    -test BG q6h if NPO/TF   - continue to optimize NPH regimen  Adjust NPH regimen to:      00:00 NPH 8 units      06:00 NPH 44 units      12:00 NPH 8 units       18:00 NPH 6units  If BG <100 can administer 50% of the dose. HOLD IF TFS OFF  -C/w Admelog moderate correction scale q6h for now  - May need to further titrate down later day doses as 6am dose titrated up.   -c/w Admelog moderate correction scale Q6h  -notify endocrine team if any change to diet/TF regimen  Discharge plan:  - Likely to discharge patient on basal/bolus insulin. Final regimen pending clinical course.  - Recommend routine outpatient ophthalmology, podiatry  - Can f/u with endocrinologist Dr. Saavedra.  - Make sure pt has Rx for all DM supplies and insulin/ DM meds.  -Based on pt's clinical condition and mental status at this time she is not able to independently manage her DM. Will evaluate pt's ability to manage DM at time of discharge. If unable> pt will need family/ care giver (s) to manage DM care at home       Problem/Plan - 2:  ·  Problem: Adrenal insufficiency.   ·  Plan: On chronic steroids at home: medrol 8mg qd   C/w Prednisone 8mg qd   Will need stress steroids if acutely ill.     Problem/Plan - 3:  ·  Problem: Hyperlipidemia.   ·  Plan: Off rosuvastatin 5mg daily  Re start if not contraindicated and as per cardiology  -F/u levels as out pt.    discussed with pt and RN , messaged Humza BRUNO via teams  Contact via [employer] Teams during business hours  On evenings and weekends (or if no response on [employer] Teams) please call [phone] or page endocrine fellow on call.   Email: [email or web address]   Please note that this patient may be followed by different provider tomorrow.    greater than 50% of the encounter was spent counseling and/or coordination of care.  26 minutes spent on total encounter; The necessity of the time spent during the encounter on this date of service was due to development of plan of care/coordination of care/glycemic control through review of labs, blood glucose values and vital signs.

## 2022-11-04 LAB
ALBUMIN SERPL ELPH-MCNC: 2.9 G/DL — LOW (ref 3.3–5)
ALP SERPL-CCNC: 123 U/L — HIGH (ref 40–120)
ALT FLD-CCNC: 15 U/L — SIGNIFICANT CHANGE UP (ref 10–45)
ANION GAP SERPL CALC-SCNC: 11 MMOL/L — SIGNIFICANT CHANGE UP (ref 5–17)
AST SERPL-CCNC: 19 U/L — SIGNIFICANT CHANGE UP (ref 10–40)
BILIRUB SERPL-MCNC: 0.2 MG/DL — SIGNIFICANT CHANGE UP (ref 0.2–1.2)
BUN SERPL-MCNC: 22 MG/DL — SIGNIFICANT CHANGE UP (ref 7–23)
CALCIUM SERPL-MCNC: 9.4 MG/DL — SIGNIFICANT CHANGE UP (ref 8.4–10.5)
CHLORIDE SERPL-SCNC: 103 MMOL/L — SIGNIFICANT CHANGE UP (ref 96–108)
CO2 SERPL-SCNC: 26 MMOL/L — SIGNIFICANT CHANGE UP (ref 22–31)
CREAT SERPL-MCNC: 0.42 MG/DL — LOW (ref 0.5–1.3)
EGFR: 103 ML/MIN/1.73M2 — SIGNIFICANT CHANGE UP
GAS PNL BLDA: SIGNIFICANT CHANGE UP
GAS PNL BLDA: SIGNIFICANT CHANGE UP
GLUCOSE BLDC GLUCOMTR-MCNC: 153 MG/DL — HIGH (ref 70–99)
GLUCOSE BLDC GLUCOMTR-MCNC: 214 MG/DL — HIGH (ref 70–99)
GLUCOSE BLDC GLUCOMTR-MCNC: 273 MG/DL — HIGH (ref 70–99)
GLUCOSE SERPL-MCNC: 153 MG/DL — HIGH (ref 70–99)
HCT VFR BLD CALC: 29.9 % — LOW (ref 34.5–45)
HGB BLD-MCNC: 9.2 G/DL — LOW (ref 11.5–15.5)
MAGNESIUM SERPL-MCNC: 2 MG/DL — SIGNIFICANT CHANGE UP (ref 1.6–2.6)
MCHC RBC-ENTMCNC: 23.7 PG — LOW (ref 27–34)
MCHC RBC-ENTMCNC: 30.8 GM/DL — LOW (ref 32–36)
MCV RBC AUTO: 77.1 FL — LOW (ref 80–100)
NRBC # BLD: 0 /100 WBCS — SIGNIFICANT CHANGE UP (ref 0–0)
PHOSPHATE SERPL-MCNC: 3.5 MG/DL — SIGNIFICANT CHANGE UP (ref 2.5–4.5)
PLATELET # BLD AUTO: 284 K/UL — SIGNIFICANT CHANGE UP (ref 150–400)
POTASSIUM SERPL-MCNC: 3.9 MMOL/L — SIGNIFICANT CHANGE UP (ref 3.5–5.3)
POTASSIUM SERPL-SCNC: 3.9 MMOL/L — SIGNIFICANT CHANGE UP (ref 3.5–5.3)
PROT SERPL-MCNC: 6.7 G/DL — SIGNIFICANT CHANGE UP (ref 6–8.3)
RBC # BLD: 3.88 M/UL — SIGNIFICANT CHANGE UP (ref 3.8–5.2)
RBC # FLD: 22.1 % — HIGH (ref 10.3–14.5)
SODIUM SERPL-SCNC: 140 MMOL/L — SIGNIFICANT CHANGE UP (ref 135–145)
VANCOMYCIN TROUGH SERPL-MCNC: 19.4 UG/ML — SIGNIFICANT CHANGE UP (ref 10–20)
WBC # BLD: 14.48 K/UL — HIGH (ref 3.8–10.5)
WBC # FLD AUTO: 14.48 K/UL — HIGH (ref 3.8–10.5)

## 2022-11-04 PROCEDURE — 99232 SBSQ HOSP IP/OBS MODERATE 35: CPT

## 2022-11-04 PROCEDURE — 71045 X-RAY EXAM CHEST 1 VIEW: CPT | Mod: 26

## 2022-11-04 RX ORDER — NYSTATIN CREAM 100000 [USP'U]/G
1 CREAM TOPICAL
Refills: 0 | Status: DISCONTINUED | OUTPATIENT
Start: 2022-11-04 | End: 2022-11-22

## 2022-11-04 RX ORDER — TIMOLOL 0.5 %
1 DROPS OPHTHALMIC (EYE)
Refills: 0 | Status: DISCONTINUED | OUTPATIENT
Start: 2022-11-04 | End: 2022-11-09

## 2022-11-04 RX ORDER — HUMAN INSULIN 100 [IU]/ML
10 INJECTION, SUSPENSION SUBCUTANEOUS
Refills: 0 | Status: DISCONTINUED | OUTPATIENT
Start: 2022-11-05 | End: 2022-11-07

## 2022-11-04 RX ORDER — DORZOLAMIDE HYDROCHLORIDE 20 MG/ML
1 SOLUTION/ DROPS OPHTHALMIC THREE TIMES A DAY
Refills: 0 | Status: DISCONTINUED | OUTPATIENT
Start: 2022-11-04 | End: 2022-11-09

## 2022-11-04 RX ORDER — METOPROLOL TARTRATE 50 MG
12.5 TABLET ORAL EVERY 12 HOURS
Refills: 0 | Status: DISCONTINUED | OUTPATIENT
Start: 2022-11-04 | End: 2022-11-09

## 2022-11-04 RX ORDER — MAGNESIUM SULFATE 500 MG/ML
1 VIAL (ML) INJECTION ONCE
Refills: 0 | Status: COMPLETED | OUTPATIENT
Start: 2022-11-04 | End: 2022-11-04

## 2022-11-04 RX ORDER — HUMAN INSULIN 100 [IU]/ML
46 INJECTION, SUSPENSION SUBCUTANEOUS
Refills: 0 | Status: DISCONTINUED | OUTPATIENT
Start: 2022-11-05 | End: 2022-11-07

## 2022-11-04 RX ORDER — POTASSIUM CHLORIDE 20 MEQ
10 PACKET (EA) ORAL ONCE
Refills: 0 | Status: COMPLETED | OUTPATIENT
Start: 2022-11-04 | End: 2022-11-04

## 2022-11-04 RX ADMIN — Medication 50 MILLIGRAM(S): at 15:17

## 2022-11-04 RX ADMIN — Medication 5 MILLIGRAM(S): at 22:29

## 2022-11-04 RX ADMIN — Medication 3 MILLILITER(S): at 05:50

## 2022-11-04 RX ADMIN — PANTOPRAZOLE SODIUM 40 MILLIGRAM(S): 20 TABLET, DELAYED RELEASE ORAL at 11:23

## 2022-11-04 RX ADMIN — Medication 1 APPLICATION(S): at 14:59

## 2022-11-04 RX ADMIN — Medication 4: at 13:47

## 2022-11-04 RX ADMIN — Medication 1 DROP(S): at 06:36

## 2022-11-04 RX ADMIN — Medication 250 MILLIGRAM(S): at 22:33

## 2022-11-04 RX ADMIN — CHLORHEXIDINE GLUCONATE 1 APPLICATION(S): 213 SOLUTION TOPICAL at 06:14

## 2022-11-04 RX ADMIN — Medication 5 MILLIGRAM(S): at 06:34

## 2022-11-04 RX ADMIN — MAGNESIUM OXIDE 400 MG ORAL TABLET 400 MILLIGRAM(S): 241.3 TABLET ORAL at 22:28

## 2022-11-04 RX ADMIN — Medication 2: at 00:56

## 2022-11-04 RX ADMIN — Medication 1 APPLICATION(S): at 11:23

## 2022-11-04 RX ADMIN — MEXILETINE HYDROCHLORIDE 200 MILLIGRAM(S): 150 CAPSULE ORAL at 22:27

## 2022-11-04 RX ADMIN — Medication 6: at 17:33

## 2022-11-04 RX ADMIN — DORZOLAMIDE HYDROCHLORIDE 1 DROP(S): 20 SOLUTION/ DROPS OPHTHALMIC at 06:36

## 2022-11-04 RX ADMIN — Medication 3 MILLILITER(S): at 17:10

## 2022-11-04 RX ADMIN — Medication 0.5 MILLIGRAM(S): at 17:11

## 2022-11-04 RX ADMIN — NYSTATIN CREAM 1 APPLICATION(S): 100000 CREAM TOPICAL at 17:35

## 2022-11-04 RX ADMIN — Medication 50 MILLIGRAM(S): at 22:28

## 2022-11-04 RX ADMIN — MAGNESIUM OXIDE 400 MG ORAL TABLET 400 MILLIGRAM(S): 241.3 TABLET ORAL at 06:35

## 2022-11-04 RX ADMIN — Medication 1 APPLICATION(S): at 22:52

## 2022-11-04 RX ADMIN — DORZOLAMIDE HYDROCHLORIDE 1 DROP(S): 20 SOLUTION/ DROPS OPHTHALMIC at 22:35

## 2022-11-04 RX ADMIN — Medication 3 MILLILITER(S): at 23:46

## 2022-11-04 RX ADMIN — Medication 1 DROP(S): at 19:00

## 2022-11-04 RX ADMIN — Medication 12.5 MILLIGRAM(S): at 02:51

## 2022-11-04 RX ADMIN — MEXILETINE HYDROCHLORIDE 200 MILLIGRAM(S): 150 CAPSULE ORAL at 15:16

## 2022-11-04 RX ADMIN — Medication 0.5 MILLIGRAM(S): at 05:51

## 2022-11-04 RX ADMIN — Medication 3 MILLILITER(S): at 11:16

## 2022-11-04 RX ADMIN — APIXABAN 5 MILLIGRAM(S): 2.5 TABLET, FILM COATED ORAL at 17:34

## 2022-11-04 RX ADMIN — APIXABAN 5 MILLIGRAM(S): 2.5 TABLET, FILM COATED ORAL at 06:38

## 2022-11-04 RX ADMIN — Medication 1 TABLET(S): at 13:15

## 2022-11-04 RX ADMIN — HUMAN INSULIN 6 UNIT(S): 100 INJECTION, SUSPENSION SUBCUTANEOUS at 17:33

## 2022-11-04 RX ADMIN — MEXILETINE HYDROCHLORIDE 200 MILLIGRAM(S): 150 CAPSULE ORAL at 06:35

## 2022-11-04 RX ADMIN — Medication 50 MILLIEQUIVALENT(S): at 01:52

## 2022-11-04 RX ADMIN — MAGNESIUM OXIDE 400 MG ORAL TABLET 400 MILLIGRAM(S): 241.3 TABLET ORAL at 01:51

## 2022-11-04 RX ADMIN — Medication 12.5 MILLIGRAM(S): at 17:34

## 2022-11-04 RX ADMIN — HUMAN INSULIN 44 UNIT(S): 100 INJECTION, SUSPENSION SUBCUTANEOUS at 06:41

## 2022-11-04 RX ADMIN — FLUCONAZOLE 150 MILLIGRAM(S): 150 TABLET ORAL at 22:30

## 2022-11-04 RX ADMIN — Medication 4 MILLILITER(S): at 17:28

## 2022-11-04 RX ADMIN — Medication 50 MILLIGRAM(S): at 06:35

## 2022-11-04 RX ADMIN — Medication 250 MILLIGRAM(S): at 10:23

## 2022-11-04 RX ADMIN — Medication 1 APPLICATION(S): at 06:15

## 2022-11-04 RX ADMIN — Medication 8 MILLIGRAM(S): at 06:38

## 2022-11-04 RX ADMIN — MAGNESIUM OXIDE 400 MG ORAL TABLET 400 MILLIGRAM(S): 241.3 TABLET ORAL at 15:15

## 2022-11-04 RX ADMIN — Medication 5 MILLIGRAM(S): at 15:16

## 2022-11-04 RX ADMIN — Medication 100 GRAM(S): at 01:50

## 2022-11-04 RX ADMIN — Medication 4 MILLILITER(S): at 05:50

## 2022-11-04 RX ADMIN — HUMAN INSULIN 8 UNIT(S): 100 INJECTION, SUSPENSION SUBCUTANEOUS at 00:57

## 2022-11-04 RX ADMIN — HUMAN INSULIN 8 UNIT(S): 100 INJECTION, SUSPENSION SUBCUTANEOUS at 13:47

## 2022-11-04 RX ADMIN — Medication 250 MILLIGRAM(S): at 01:45

## 2022-11-04 RX ADMIN — Medication 500 MILLIGRAM(S): at 13:14

## 2022-11-04 NOTE — PROGRESS NOTE ADULT - PROBLEM SELECTOR PLAN 1
-test BG q6h if NPO/TF   -Adjust NPH to 8-46-10-6 units q6h for now. If BG <100 can administer 50% of the dose. HOLD IF TFS OFF  -If TFs off after NPH is given please start D5 infusion at TF rate to prevent rebound hypoglycemia.   -C/w Admelog moderate correction scale q6h for now  Discharge plan:  - Likely to discharge patient on basal/bolus insulin. Final regimen pending clinical course.  - Recommend routine outpatient ophthalmology, podiatry  - Can f/u with endocrinologist Dr. Saavedra.  - Make sure pt has Rx for all DM supplies and insulin/ DM meds.  -Based on pt's clinical condition and mental status at this time she is not able to independently manage her DM. Will evaluate pt's ability to manage DM at time of discharge. If unable> pt will need family/ care giver (s) to manage DM care at home  -Will need rehab.

## 2022-11-04 NOTE — PROGRESS NOTE ADULT - SUBJECTIVE AND OBJECTIVE BOX
CHIEF COMPLAINT: f/up sob, chronic resp failure, TBM, severe persistent asthma, VC dysfunction, Type A aortic dissection s/p repair w/modified "Bentall procedure and hemiarch replacement 9/6-trached--on TC-good spirits/talkative    Interval Events: ENT f/up, vest use    REVIEW OF SYSTEMS:  Constitutional: No fevers or chills. No weight loss. No fatigue or generalized malaise.  Eyes: No itching or discharge from the eyes  ENT: No ear pain. No ear discharge. No nasal congestion. No post nasal drip. No epistaxis. No throat pain. No sore throat. No difficulty swallowing.   CV: No chest pain. No palpitations. No lightheadedness or dizziness.   Resp: No dyspnea at rest. No dyspnea on exertion. No orthopnea. No wheezing. + cough. No stridor. No sputum production. No chest pain with respiration.  GI: No nausea. No vomiting. No diarrhea.  MSK: No joint pain or pain in any extremities  Integumentary: No skin lesions. No pedal edema.  Neurological: No gross motor weakness. No sensory changes.  +[ ] All other systems negative  [ ] Unable to assess ROS because ________    OBJECTIVE:  ICU Vital Signs Last 24 Hrs  T(C): 36.4 (03 Nov 2022 16:00), Max: 36.7 (03 Nov 2022 12:00)  T(F): 97.5 (03 Nov 2022 16:00), Max: 98.1 (03 Nov 2022 12:00)  HR: 81 (04 Nov 2022 03:26) (77 - 103)  BP: --  BP(mean): --  ABP: 127/52 (04 Nov 2022 03:26) (114/44 - 161/63)  ABP(mean): 76 (04 Nov 2022 03:26) (64 - 92)  RR: 33 (04 Nov 2022 03:26) (21 - 55)  SpO2: 98% (04 Nov 2022 03:26) (93% - 100%)    O2 Parameters below as of 04 Nov 2022 03:26  Patient On (Oxygen Delivery Method): tracheostomy collar    O2 Concentration (%): 40          11-02 @ 07:01  -  11-03 @ 07:00  --------------------------------------------------------  IN: 2990 mL / OUT: 1050 mL / NET: 1940 mL    11-03 @ 07:01  - 11-04 @ 05:22  --------------------------------------------------------  IN: 1925 mL / OUT: 2830 mL / NET: -905 mL      CAPILLARY BLOOD GLUCOSE      POCT Blood Glucose.: 153 mg/dL (04 Nov 2022 00:43)      PHYSICAL EXAM: NAD in bed on TC  General: Awake, alert, oriented X 3.   HEENT: Atraumatic, normocephalic.                 Mallampatti Grade 3                No nasal congestion.                No tonsillar or pharyngeal exudates.  Lymph Nodes: No palpable lymphadenopathy  Neck: No JVD. No carotid bruit. trach in place  Respiratory: abnormal chest expansion                         Normal percussion                         Normal and equal air entry                         No wheeze, rhonchi or rales.  Cardiovascular: S1 S2 normal. No murmurs, rubs or gallops.   Abdomen: Soft, non-tender, non-distended. No organomegaly. Normoactive bowel sounds.  Extremities: Warm to touch. Peripheral pulse palpable. No pedal edema.   Skin: No rashes or skin lesions  R-wound vac  Neurological: Motor and sensory examination equal and normal in all four extremities.  Psychiatry: Appropriate mood and affect.    HOSPITAL MEDICATIONS:  MEDICATIONS  (STANDING):  acetylcysteine 10%  Inhalation 4 milliLiter(s) Inhalation every 12 hours  albuterol/ipratropium for Nebulization 3 milliLiter(s) Nebulizer every 6 hours  apixaban 5 milliGRAM(s) Oral every 12 hours  ascorbic acid 500 milliGRAM(s) Oral daily  buDESOnide    Inhalation Suspension 0.5 milliGRAM(s) Inhalation every 12 hours  chlorhexidine 2% Cloths 1 Application(s) Topical <User Schedule>  collagenase Ointment 1 Application(s) Topical daily  diphenhydramine 2%/zinc acetate 0.1% Cream 1 Application(s) Topical every 8 hours  dorzolamide 2% Ophthalmic Solution 1 Drop(s) Left EYE three times a day  fluconAZOLE IVPB 600 milliGRAM(s) IV Intermittent every 24 hours  hydrALAZINE 50 milliGRAM(s) Oral every 8 hours  insulin lispro (ADMELOG) corrective regimen sliding scale   SubCutaneous every 6 hours  insulin NPH human recombinant 44 Unit(s) SubCutaneous <User Schedule>  insulin NPH human recombinant 8 Unit(s) SubCutaneous <User Schedule>  insulin NPH human recombinant 8 Unit(s) SubCutaneous <User Schedule>  insulin NPH human recombinant 6 Unit(s) SubCutaneous <User Schedule>  magnesium oxide 400 milliGRAM(s) Oral every 8 hours  methylPREDNISolone 8 milliGRAM(s) Oral daily  metoclopramide Injectable 5 milliGRAM(s) IV Push every 8 hours  metoprolol tartrate 12.5 milliGRAM(s) Oral every 12 hours  mexiletine 200 milliGRAM(s) Oral every 8 hours  multivitamin 1 Tablet(s) Oral daily  pantoprazole  Injectable 40 milliGRAM(s) IV Push daily  sodium chloride 0.9%. 1000 milliLiter(s) (10 mL/Hr) IV Continuous <Continuous>  timolol 0.5% Solution 1 Drop(s) Left EYE two times a day  vancomycin  IVPB 750 milliGRAM(s) IV Intermittent every 12 hours  vancomycin  IVPB        MEDICATIONS  (PRN):  acetaminophen    Suspension .. 650 milliGRAM(s) Oral every 6 hours PRN Temp greater or equal to 38C (100.4F), Mild Pain (1 - 3)      LABS:                        9.2    14.48 )-----------( 284      ( 04 Nov 2022 00:44 )             29.9     11-04    140  |  103  |  22  ----------------------------<  153<H>  3.9   |  26  |  0.42<L>    Ca    9.4      04 Nov 2022 00:44  Phos  3.5     11-04  Mg     2.0     11-04    TPro  6.7  /  Alb  2.9<L>  /  TBili  0.2  /  DBili  x   /  AST  19  /  ALT  15  /  AlkPhos  123<H>  11-04        Arterial Blood Gas:  11-04 @ 00:35  7.49/40/110/30/98.3/6.6  ABG lactate: --  Arterial Blood Gas:  11-03 @ 00:40  7.43/45/92/30/98.7/4.9  ABG lactate: --        MICROBIOLOGY:     RADIOLOGY:  [ ] Reviewed and interpreted by me    Point of Care Ultrasound Findings:    PFT:    EKG:

## 2022-11-04 NOTE — PROGRESS NOTE ADULT - TIME BILLING
as above: s/p ENT f/up s/p awake laryngoscopy  -ID f/up-resp stable--wound care f/up-TC continues- (she will always have secretions) due to TBM-s/p  trach 10/10-s/p  resp failure-s/p bodjdg-VSUY-mp ABX-stable CV status-re- VEST rx in use  multifactorial dyspnea-resp failure-severe persistent asthma, TBM s/p tracheoplasty, s/p Aortic aneurysm repair, Bronchitis (proteus)-O2-keep 90%;TC  severe persistent asthma--medrol 8mg, singulair 10, duoneb q 6, budes .5 bid, tezspire 8/29-was due 9/29  TBM-s/p tracheoplasty--accapella, vest rx, mucomyst here 2 cc 10% q 8 (secretions)  s/p Aortic aneurysm repair--as per CTS staff care  AF-on eliquis rx               CV-improved hydralazine/lopressor/mexilitine   DVT R-IJ-on heparin rx  *****ID-s/p proteus bronchitis-s/p meropenum changed to ceftriaxone and back to meropenem/vanco- off of ABX as of 9/19--restart of ABX 9/27-meropenem/vanco-NOW on meropenem and vanco for MRSE sepsis (11/15 end date for vanco), plastics/ortho for elbow-proteus (?wash out)-vac in place-suppressive rx-bact/fluconazole  PT-OOB as able                             GI-TF as able--f/up LFTs-normal       ****ORTHO f/up--? additional wash out of elbow wound?; wound care f/up  **VC dysfunction--aspiration precautions-s/p trach 10/10-ENT f/up for awake laryngoscopy-? decannulation    Heme onc f/up colon ca  prog--critical in CTU                PT-to continue-more OOB     DC planning    Eulalio Allison MD-Pulmonary   [phone]

## 2022-11-04 NOTE — PROGRESS NOTE ADULT - NSPROGADDITIONALINFOA_GEN_ALL_CORE
-Plan discussed with pt/team.  Contact info: [phone] (24/7). pager [phone]  [email or web address] password [email or web address]  Teams  Spent 28 minutes assessing pt/labs/meds and discussing plan of care with primary team  Adjusting insulin  Discharge plan  Follow up care

## 2022-11-04 NOTE — PROGRESS NOTE ADULT - ASSESSMENT

## 2022-11-04 NOTE — PROGRESS NOTE ADULT - SUBJECTIVE AND OBJECTIVE BOX
Patient seen and examined at the bedside.    Remained critically ill on continuous ICU monitoring.    OBJECTIVE:  Vital Signs Last 24 Hrs  T(C): 36.4 (03 Nov 2022 16:00), Max: 36.7 (03 Nov 2022 12:00)  T(F): 97.5 (03 Nov 2022 16:00), Max: 98.1 (03 Nov 2022 12:00)  HR: 77 (04 Nov 2022 07:00) (72 - 95)  BP: --  BP(mean): --  RR: 21 (04 Nov 2022 07:00) (21 - 40)  SpO2: 97% (04 Nov 2022 07:00) (93% - 100%)    Parameters below as of 04 Nov 2022 05:57  Patient On (Oxygen Delivery Method): tracheostomy collar        Physical Exam:   General: Trach collar, NAD  Neurology: Nonfocal, responds to commands. Moves all extremities  ENT/Neck: + trach c/d/i, neck supple, trachea midline   Respiratory: coarse BS b/l, rhonchi throughout, minimal secretions present  CV: S1S2, no murmurs        [x] Sinus Rhythm   Abdominal: Soft, NT, ND, colostomy in place (stoma intact)  Extremities: nonpitting edema, improved. Right elbow wound vac c/d/i. warm and well-perfused.  Skin: Dry dressing over right heel. No cyanosis                 Assessment:  71F PMH DM, COPD, chronic adrenal insufficiency on Prednisone, history of colorectal cancer s/p resection (colostomy bag), Hx of CAD, chronic AF on Eliquis, and tracheomalacia s/p multiple intubations, recent dx of R foot OM s/p debridement on antibiotics and presented to ED at [facility] this morning with epigastric pain, belching and central chest pain. Found on CTA to have type A aortic dissection and transferred to [facility] for surgical evaluation by Dr. Cabrera.     Ascending aortic dissection s/p type A dissection repair and Biobentall on 9/7   Respiratory failure s/p Trach 10/10/22  Hypovolemia   Post op respiratory failure   Acute blood loss anemia  Stress hyperglycemia   RIJ thrombus  MRSA PNA        Today:  - Started on Lopressor      Plan:   ***Neuro***  [x] Nonfocal  follows simple commands, continue to monitor  PT/OT progress as tolerated, ambulated with physical therapy    ***Cardiovascular***  Limited TTE on 10/1: EF 69%, Hyperdynamic left ventricular systolic function. There is hypokinesis of the mid-base inferior wall. Nml RV fxn.   CT chest on 9/28: No CT evidence of pulmonary embolism. Status post repair of ascending aortic dissection with a stable dissection flap originating from the aortic arch and extending into the infrarenal abdominal aorta,  B/l UE duplex on 10/5: As before, there is an occluded RIGHT IJ vein with thrombosis extending to brachiocephalic vein. Superficial thrombophlebitis of the LEFT cephalic vein.   Invasive hemodynamic monitoring, assess perfusion indices   SR / MAP 69 / Hct 29.9% / Lactate 1.4  Continuos reassessment of hemodynamics   Hydralazine PO for BP management   Rate control with Mexiletine and Lopressor   WVAC on rt elbow changed 11/2  [x] AC Therapy with Eliquis 5 BID for Afib and IJ thrombus    ***Pulmonary***  Trach collar 10L/40% since 10/25, continue  Respiratory failure s/p Trach #8 portex  10/10/22, per ENT inner cannula needs to be changed daily, trach sutures d/c'ed by ENT 10/17 , 40% trach collar for 2 days  Titration of FiO2, follow SpO2, CXR, blood gases   Bronched 10/13  Continue duonebs  Suction q2hrly as needed   Aggressive Pulmonary toilet  Downsized to 7 cuffless trach     ***GI***  [x] Diet: TFs Glucerna 1.2 @ goal 65ml/hr, tolerating without issues (when glucerna 1.5 back in stock will switch and decrease goal rate to 55ml/hr)  [x] Protonix    Reglan for gut motility     ***Renal***  Continue to monitor I/Os, BUN/Creatinine.   Replete lytes PRN    ***ID***  SCx on 10/4+Methicillin resistant Staphylococcus aureus, c/w IVPB Vancomycin, (plan for 6 week course in setting of valve surgery, 11/26 end date)  9/27 blood culture Neela Glabarata, on flucanazole (lifelong suppression)  BCx 10/13 NGTD, BC 10/21 and SCx NGTD  R elbow wound, S/p IR for elbow drainage 10/13 + Few Proteus mirabilis ESBL, Meropenem 7 day trial completed 10/19-> reordered 10/20 for reaccumulation of elbow bursitis-  10/22 plastics debrided R elbow eschar to subc tissue, ~8cc fluid aspirated and sent for culture. Wet to dry dressing changed 2x daily. PT changed vac yesterday  Joint Fluid Cx 10/22 NTD  Meropenem for 8 day course (until 10/28),started in setting of elbow collection 10/22,Course completed yesterday.    ***Endocrine***  [x]  DM : HbA1c 9.4%                - [x] ISS  [x] NPH              - Need tight glycemic control to prevent wound infection.  Endocrine following, appreciate recommendations            Patient requires continuous monitoring with bedside rhythm monitoring, pulse oximetry monitoring, and continuous central venous and arterial pressure monitoring; and intermittent blood gas analysis. Care plan discussed with the ICU care team.   Patient remained critical, at risk for life threatening decompensation.    I have spent 30 minutes providing critical care management to this patient.    By signing my name below, I, Maria D'Amico, attest that this documentation has been prepared under the direction and in the presence of KIANA Briseno  Electronically signed: Maria D'Amico, Scribe, 11-04-22 @ 07:46    I, Humza Samuels , personally performed the services described in this documentation. all medical record entries made by the scribe were at my direction and in my presence. I have reviewed the chart and agree that the record reflects my personal performance and is accurate and complete  Electronically signed: KIANA Briseno Patient seen and examined at the bedside.    Remained critically ill on continuous ICU monitoring.    OBJECTIVE:  Vital Signs Last 24 Hrs  T(C): 36.4 (03 Nov 2022 16:00), Max: 36.7 (03 Nov 2022 12:00)  T(F): 97.5 (03 Nov 2022 16:00), Max: 98.1 (03 Nov 2022 12:00)  HR: 77 (04 Nov 2022 07:00) (72 - 95)  BP: --  BP(mean): --  RR: 21 (04 Nov 2022 07:00) (21 - 40)  SpO2: 97% (04 Nov 2022 07:00) (93% - 100%)    Parameters below as of 04 Nov 2022 05:57  Patient On (Oxygen Delivery Method): tracheostomy collar        Physical Exam:   General: Trach collar, NAD  Neurology: Nonfocal, responds to commands. Moves all extremities  ENT/Neck: + trach c/d/i, neck supple, trachea midline   Respiratory: coarse BS b/l, rhonchi throughout, minimal secretions present  CV: S1S2, no murmurs        [x] Sinus Rhythm   Abdominal: Soft, NT, ND, colostomy in place (stoma intact)  Extremities: nonpitting edema, improved. Right elbow wound vac c/d/i. warm and well-perfused.  Skin: Dry dressing over right heel. No cyanosis                 Assessment:  71F PMH DM, COPD, chronic adrenal insufficiency on Prednisone, history of colorectal cancer s/p resection (colostomy bag), Hx of CAD, chronic AF on Eliquis, and tracheomalacia s/p multiple intubations, recent dx of R foot OM s/p debridement on antibiotics and presented to ED at [facility] this morning with epigastric pain, belching and central chest pain. Found on CTA to have type A aortic dissection and transferred to [facility] for surgical evaluation by Dr. Cabrera.     Ascending aortic dissection s/p type A dissection repair and Biobentall on 9/7   Respiratory failure s/p Trach 10/10/22  Hypovolemia   Post op respiratory failure   Acute blood loss anemia  Stress hyperglycemia   RIJ thrombus  MRSA PNA        Today:  - Speech and swallow trial planned for today  - Plan to remove A line   - Possible vac change  - Restarted Lopressor  - Continue holding Dig      Plan:   ***Neuro***  [x] Nonfocal  follows simple commands, continue to monitor  PT/OT progress as tolerated, ambulated with physical therapy    ***Cardiovascular***  Limited TTE on 10/1: EF 69%, Hyperdynamic left ventricular systolic function. There is hypokinesis of the mid-base inferior wall. Nml RV fxn.   CT chest on 9/28: No CT evidence of pulmonary embolism. Status post repair of ascending aortic dissection with a stable dissection flap originating from the aortic arch and extending into the infrarenal abdominal aorta,  B/l UE duplex on 10/5: As before, there is an occluded RIGHT IJ vein with thrombosis extending to brachiocephalic vein. Superficial thrombophlebitis of the LEFT cephalic vein.   Invasive hemodynamic monitoring, assess perfusion indices   SR / MAP 69 / Hct 29.9% / Lactate 1.4  Continuos reassessment of hemodynamics   Hydralazine PO for BP management   Rate control with Mexiletine and Lopressor   WVAC on rt elbow changed 11/2  [x] AC Therapy with Eliquis 5 BID for Afib and IJ thrombus    ***Pulmonary***  Trach collar 10L/40% since 10/25, continue  Respiratory failure s/p Trach #8 portex  10/10/22, per ENT inner cannula needs to be changed daily, trach sutures d/c'ed by ENT 10/17 , 40% trach collar for 2 days  Titration of FiO2, follow SpO2, CXR, blood gases   Bronched 10/13  Continue duonebs  Suction q2hrly as needed   Aggressive Pulmonary toilet  Downsized to 7 cuffless trach     ***GI***  [x] Diet: TFs Glucerna 1.2 @ goal 65ml/hr, tolerating without issues (when glucerna 1.5 back in stock will switch and decrease goal rate to 55ml/hr)  [x] Protonix    Reglan for gut motility     ***Renal***  Continue to monitor I/Os, BUN/Creatinine.   Replete lytes PRN    ***ID***  SCx on 10/4+Methicillin resistant Staphylococcus aureus, c/w IVPB Vancomycin, (plan for 6 week course in setting of valve surgery, 11/26 end date)  9/27 blood culture Neela Glabarata, on flucanazole (lifelong suppression)  BCx 10/13 NGTD, BC 10/21 and SCx NGTD  R elbow wound, S/p IR for elbow drainage 10/13 + Few Proteus mirabilis ESBL, Meropenem 7 day trial completed 10/19-> reordered 10/20 for reaccumulation of elbow bursitis-  10/22 plastics debrided R elbow eschar to subc tissue, ~8cc fluid aspirated and sent for culture. Wet to dry dressing changed 2x daily. PT changed vac yesterday  Joint Fluid Cx 10/22 NTD  Meropenem for 8 day course (until 10/28),started in setting of elbow collection 10/22,Course completed yesterday.    ***Endocrine***  [x]  DM : HbA1c 9.4%                - [x] ISS  [x] NPH              - Need tight glycemic control to prevent wound infection.  Endocrine following, appreciate recommendations            Patient requires continuous monitoring with bedside rhythm monitoring, pulse oximetry monitoring, and continuous central venous and arterial pressure monitoring; and intermittent blood gas analysis. Care plan discussed with the ICU care team.   Patient remained critical, at risk for life threatening decompensation.    I have spent 30 minutes providing critical care management to this patient.    By signing my name below, I, Maria D'Amico, attest that this documentation has been prepared under the direction and in the presence of KIANA Briseno  Electronically signed: Maria D'Amico, Scribe, 11-04-22 @ 07:46    I, Humza Samules , personally performed the services described in this documentation. all medical record entries made by the marcyibronaldo were at my direction and in my presence. I have reviewed the chart and agree that the record reflects my personal performance and is accurate and complete  Electronically signed: KIANA Briseno Patient seen and examined at the bedside.    Remained critically ill on continuous ICU monitoring.    OBJECTIVE:  Vital Signs Last 24 Hrs  T(C): 36.4 (03 Nov 2022 16:00), Max: 36.7 (03 Nov 2022 12:00)  T(F): 97.5 (03 Nov 2022 16:00), Max: 98.1 (03 Nov 2022 12:00)  HR: 77 (04 Nov 2022 07:00) (72 - 95)  BP: --  BP(mean): --  RR: 21 (04 Nov 2022 07:00) (21 - 40)  SpO2: 97% (04 Nov 2022 07:00) (93% - 100%)    Parameters below as of 04 Nov 2022 05:57  Patient On (Oxygen Delivery Method): tracheostomy collar      Physical Exam:   General: Trach collar, NAD  Neurology: Nonfocal, responds to commands. Moves all extremities  ENT/Neck: + trach c/d/i, neck supple, trachea midline   Respiratory: coarse BS b/l, rhonchi throughout, minimal secretions present  CV: S1S2, no murmurs        [x] Sinus Rhythm   Abdominal: Soft, NT, ND, colostomy in place (stoma intact)  Extremities: nonpitting edema, improved. Right elbow wound vac c/d/i. warm and well-perfused.  Skin: Dry dressing over right heel. No cyanosis                 Assessment:  71F PMH DM, COPD, chronic adrenal insufficiency on Prednisone, history of colorectal cancer s/p resection (colostomy bag), Hx of CAD, chronic AF on Eliquis, and tracheomalacia s/p multiple intubations, recent dx of R foot OM s/p debridement on antibiotics and presented to ED at [facility] this morning with epigastric pain, belching and central chest pain. Found on CTA to have type A aortic dissection and transferred to [facility] for surgical evaluation by Dr. Cabrera.     Ascending aortic dissection s/p type A dissection repair and Biobentall on 9/7   Respiratory failure s/p Trach 10/10/22  Hypovolemia   Post op respiratory failure   Acute blood loss anemia  Stress hyperglycemia   RIJ thrombus  MRSA PNA        Today:  - Speech and swallow trial planned for today  - Plan to remove A line   - Elbow vac change  - Restarted Lopressor, as tolerated  - Continue holding Dig for hx of bradycardia   - Plan to send to SDU      Plan:   ***Neuro***  [x] Nonfocal  follows commands, continue to monitor  PT/OT progress as tolerated, ambulated with physical therapy    ***Cardiovascular***  Limited TTE on 10/1: EF 69%, Hyperdynamic left ventricular systolic function. There is hypokinesis of the mid-base inferior wall. Nml RV fxn.   CT chest on 9/28: No CT evidence of pulmonary embolism. Status post repair of ascending aortic dissection with a stable dissection flap originating from the aortic arch and extending into the infrarenal abdominal aorta,  B/l UE duplex on 10/5: As before, there is an occluded RIGHT IJ vein with thrombosis extending to brachiocephalic vein. Superficial thrombophlebitis of the LEFT cephalic vein.   Invasive hemodynamic monitoring, assess perfusion indices   SR / MAP 69 / Hct 29.9% / Lactate 1.4  Continuos reassessment of hemodynamics   Hydralazine PO for BP management   Rate control with Lopressor   WVAC on rt elbow changed today   [x] AC Therapy with Eliquis 5 BID for Afib and IJ thrombus    ***Pulmonary***  Trach collar 10L/40% since 10/25, continue  Respiratory failure s/p Trach #8 portex  10/10/22, per ENT inner cannula needs to be changed daily, trach sutures d/c'ed by ENT 10/17 , 40% trach collar for 2 days  Titration of FiO2, follow SpO2, CXR, blood gases   Bronched 10/13  Continue duonebs  Suction q2hrly as needed   Aggressive Pulmonary toilet  Downsized to 7 cuffless trach yesterday    ***GI***  [x] Diet: TFs Glucerna 1.2 @ goal 65ml/hr, tolerating without issues (when glucerna 1.5 back in stock will switch and decrease goal rate to 55ml/hr)  [x] Protonix    Reglan for gut motility     ***Renal***  Continue to monitor I/Os, BUN/Creatinine.   Replete lytes PRN    ***ID***  SCx on 10/4+Methicillin resistant Staphylococcus aureus, c/w IVPB Vancomycin, (plan for 6 week course in setting of valve surgery, 11/26 end date)  9/27 blood culture Neela Glabarata, on flucanazole (lifelong suppression)  BCx 10/13 NGTD, BC 10/21 and SCx NGTD  R elbow wound, S/p IR for elbow drainage 10/13 + Few Proteus mirabilis ESBL, Meropenem 7 day trial completed 10/19-> reordered 10/20 for reaccumulation of elbow bursitis-  10/22 plastics debrided R elbow eschar to subc tissue, ~8cc fluid aspirated and sent for culture. Wet to dry dressing changed 2x daily. PT changed vac yesterday  Joint Fluid Cx 10/22 NTD  Meropenem for 8 day course (until 10/28),started in setting of elbow collection 10/22,Course completed.    ***Endocrine***  [x]  DM : HbA1c 9.4%                - [x] ISS  [x] NPH              - Need tight glycemic control to prevent wound infection.  Endocrine following, appreciate recommendations            Patient requires continuous monitoring with bedside rhythm monitoring, pulse oximetry monitoring, and continuous central venous and arterial pressure monitoring; and intermittent blood gas analysis. Care plan discussed with the ICU care team.   Patient remained critical, at risk for life threatening decompensation.    I have spent 30 minutes providing critical care management to this patient.    By signing my name below, I, Maria D'Amico, attest that this documentation has been prepared under the direction and in the presence of KIANA Briseno  Electronically signed: Maria D'Amico, Scribe, 11-04-22 @ 07:46    I, Humza Samuels , personally performed the services described in this documentation. all medical record entries made by the scribe were at my direction and in my presence. I have reviewed the chart and agree that the record reflects my personal performance and is accurate and complete  Electronically signed: KIANA Briseno

## 2022-11-04 NOTE — PROGRESS NOTE ADULT - ASSESSMENT
71F w/h/o uncontrolled T2DM (A1C 9.4%) on basal/bolus insulin PTA. Unknown DM complications. Also COPD, secondary adrenal Insufficiency on chronic steroids, colorectal cancer s/p resection (colostomy bag), Chronic A fib on Eliquis, and tracheomalacia and multiple intubations, recent dx of OM presented to ED at [facility] with epigastric pain, belching and central chest pain. Found on CTA to have type A aortic dissection and transferred to [facility] for surgical evaluation by Dr. Cabrera. Now s/p aortic dissection repair on 9/07/22. Endocrine consulted for assistance with uncontrolled DM/steroids for AI. Pt in ICU with ventricular dysfunction/sepsis, and now s/p trach 10/10 and more recently s/p R elbow eschar debridement 10/22 > Wound VAC 10/24. Tolerating TFs with BG levels variable in the last 24 hours receiving 12noon NPH insulin late ( ~2pm). Also remains with BG >200s at 12noon after taking Prednisone in am. Will continue to increase NPH in am at time pt takes steroid dose and spoke to ICU team to address with staff that 12noon insulin should be given around 12 noon to ensure insulin picks at the time is supposed to pick.  BG goal 100 [id number]. Pt remains with leukocytosis and resolved transaminitis on long term antibiotic. On Prednisone dose 8mg for AI.

## 2022-11-04 NOTE — PROGRESS NOTE ADULT - SUBJECTIVE AND OBJECTIVE BOX
DIABETES FOLLOW UP NOTE: Saw pt earlier today    Chief Complaint: Endocrine consult requested for management of T2DM    INTERVAL HX: Saw pt in CTU, able to nod yes/no to questions. Per primary team,  pt tolerating 24 hour TFs of Glucerna at 65cc/hr. BG levels still not at goal between 100s to 200s in the last 24 hours. Noted 12noon dose of NPH was given close to 2pm altering time of insulin pick.  Also remains with hyperglycemia at 12noon after getting chronic dose of Prednisone 8mg for AI. Remains on antibiotics for sepsis. Pt able to nod yes/no to questions and also able to follow directions. Per afternoon notes pt now has Speaking Valve.     Review of Systems:  General: As above  Cardiovascular: No chest pain, palpitations  Respiratory: No SOB, no cough  GI: No nausea, vomiting, abdominal pain  Endocrine: No  S&Sx of hypoglycemia    Allergies    aspirin (Short breath)  Avelox (Short breath; Pruritus)  cefepime (Anaphylaxis)  codeine (Short breath)  Dilaudid (Short breath)  iodine (Short breath; Swelling)  penicillin (Anaphylaxis)  shellfish (Anaphylaxis)  tetanus toxoid (Short breath)  Valium (Short breath)    Intolerances      MEDICATIONS:  fluconAZOLE IVPB 600 milliGRAM(s) IV Intermittent every 24 hours  insulin lispro (ADMELOG) corrective regimen sliding scale   SubCutaneous every 6 hours  insulin NPH human recombinant 8 Unit(s) SubCutaneous <User Schedule>  insulin NPH human recombinant 6 Unit(s) SubCutaneous <User Schedule>  insulin NPH human recombinant 44 Unit(s) SubCutaneous <User Schedule>  insulin NPH human recombinant 8 Unit(s) SubCutaneous <User Schedule>  methylPREDNISolone 8 milliGRAM(s) Oral daily  vancomycin  IVPB 750 milliGRAM(s) IV Intermittent every 12 hours        PHYSICAL EXAM:  VITALS: T(C): 36.7 (11-04-22 @ 12:00)  T(F): 98 (11-04-22 @ 12:00), Max: 98 (11-04-22 @ 08:00)  HR: 80 (11-04-22 @ 17:28) (72 - 96)  BP: --  RR:  (19 - 33)  SpO2:  (96% - 100%)  Wt(kg): --  GENERAL: Female getting ready to work with PT in NAD.   HEENT: Trach in place, NGT with TFs off at time of visit for PT session.    Chest: Sternal incision healed  Abdomen: Soft, nontender, non distended  Extremities: Warm, no edema in all 4 exts.   NEURO: Alert and able to nod to questions    LABS:  POCT Blood Glucose.: 273 mg/dL (11-04-22 @ 17:31)  POCT Blood Glucose.: 214 mg/dL (11-04-22 @ 13:38)  POCT Blood Glucose.: 153 mg/dL (11-04-22 @ 00:43)  POCT Blood Glucose.: 228 mg/dL (11-03-22 @ 18:20)  POCT Blood Glucose.: 229 mg/dL (11-03-22 @ 10:39)  POCT Blood Glucose.: 169 mg/dL (11-03-22 @ 06:20)  POCT Blood Glucose.: 138 mg/dL (11-03-22 @ 00:35)  POCT Blood Glucose.: 84 mg/dL (11-02-22 @ 18:12)  POCT Blood Glucose.: 207 mg/dL (11-02-22 @ 12:41)  POCT Blood Glucose.: 252 mg/dL (11-02-22 @ 08:33)  POCT Blood Glucose.: 169 mg/dL (11-02-22 @ 06:40)  POCT Blood Glucose.: 131 mg/dL (11-01-22 @ 21:25)  POCT Blood Glucose.: 67 mg/dL (11-01-22 @ 20:02) OFF TF  POCT Blood Glucose.: 66 mg/dL (11-01-22 @ 20:00)  POCT Blood Glucose.: 156 mg/dL (11-01-22 @ 17:43)                            9.2    14.48 )-----------( 284      ( 04 Nov 2022 00:44 )             29.9       11-04    140  |  103  |  22  ----------------------------<  153<H>  3.9   |  26  |  0.42<L>    eGFR: 103    Ca    9.4      11-04  Mg     2.0     11-04  Phos  3.5     11-04    TPro  6.7  /  Alb  2.9<L>  /  TBili  0.2  /  DBili  x   /  AST  19  /  ALT  15  /  AlkPhos  123<H>  11-04       A1C with Estimated Average Glucose Result: 9.4 % (09-06-22 @ 16:46)      Estimated Average Glucose: 223 mg/dL (09-06-22 @ 16:46)

## 2022-11-04 NOTE — CHART NOTE - NSCHARTNOTEFT_GEN_A_CORE
71F PMH DM, COPD, Chronic Adrenal Insufficiency on Chronic prednisone, history of colorectal cancer s/p resection (colostomy bag), Hx of CAD, Chronic A fib on Eliquis, and tracheomalacia s/p tracheoplasty, and multiple intubations, recent dx of OM presented to ED at [facility] this morning with epigastric pain, belching and central chest pain. Found on CTA to have type A aortic dissection and transferred to [facility] for surgical evaluation by Dr. Cabrera. Pt admitted for ascending aortic dissection. Patient went to OR for emergency type A aortic dissection repair with Dr. Cabrera. Extubated post-op on 9/7. On 9/8 pt developed increasingly labored breathing and due to mild delirium and depressed mental status post operatively, patient was reintubated. Extubated 9/13. Passed FEES 9/20 and started on a regular diet/thin liquids, NGT remained in place w/ TF. Intubated 9/28 s/p emesis episode on nocturnal NGT feeds, s/p 10/11 tracheostomy. 10/13 weaning to trach collar trials, tolerating TC overnight since 10/25.    Seen by this service 11/2 for speaking-valve evaluation w/ #8 Cuffed Portex. Patient tolerated PMV with no changes in VS, no subjective complaints, no signs of air trapping. Vocal quality hoarse, ENT consult recommended given new onset of hoarseness and h/o tracheomalacia.   ENT 11/3-->Scope was slowly advanced into the trachea down to the balwinder, no erythema, tracheomalacia, no subglottic stenosis, B/L VC mobile and intact, airway patent. Trach downsized to  #7 Portex uncuffed placed. Pt phonating well.     Patient seen for speaking-valve reevaluation today. D/W KIANA Ching, patient tolerating new trach well and independently digitally occluding self for phonation. Pt encountered upright in bed, on 40% TC, + #7 uncuffed Portex, + NGT, + tele (HR 82; 02 100; RR 15-36). Leak speech noted. Patient Aox4. RN [facility] provided tracheal suctioning prior to PMV placement. Pt maintained on 40% FIO2 via trach collar. Low pressure valve placed on hub of trach. Vocal quality adequate with good volume. Pt tolerated valve. VSS stable throughout assessment (RR noted to range between 18-35 with valve in place). No signs of respiratory distress. No increased work of breathing. No subjective complaints. No signs of air trapping. Intermittent wet vocal quality observed >25 minutes into assessment. Suggestive of slight increase in secretions, however pt aware and independently triggers a swallow which clears wet quality. Valve removed after 40 minutes.     Place Speaking Valve, as tolerated, during waking hours. During the first 1-2 days, general supervision for 15 minutes at a time.   Guidelines: 1) Cuff fully deflated  2) Do not place valve if patient with baseline RD and/or thick/copious secretions 3) Remove valve if: SpO2 <92%, HR/RR 1.5 x norm, increased work of breathing ,subjective complaints, evidence of airtrapping 4) Remove while asleep and for aerosolized respiratory treatments.     Above d/w ARDHA Hurtado and KIANA Ching.     Isabel Espinosa CCC-SLP   Prefer teams or [phone]

## 2022-11-05 LAB
ALBUMIN SERPL ELPH-MCNC: 3 G/DL — LOW (ref 3.3–5)
ALP SERPL-CCNC: 120 U/L — SIGNIFICANT CHANGE UP (ref 40–120)
ALT FLD-CCNC: 18 U/L — SIGNIFICANT CHANGE UP (ref 10–45)
ANION GAP SERPL CALC-SCNC: 9 MMOL/L — SIGNIFICANT CHANGE UP (ref 5–17)
AST SERPL-CCNC: 21 U/L — SIGNIFICANT CHANGE UP (ref 10–40)
BILIRUB SERPL-MCNC: 0.3 MG/DL — SIGNIFICANT CHANGE UP (ref 0.2–1.2)
BUN SERPL-MCNC: 20 MG/DL — SIGNIFICANT CHANGE UP (ref 7–23)
CALCIUM SERPL-MCNC: 9.3 MG/DL — SIGNIFICANT CHANGE UP (ref 8.4–10.5)
CHLORIDE SERPL-SCNC: 103 MMOL/L — SIGNIFICANT CHANGE UP (ref 96–108)
CO2 SERPL-SCNC: 29 MMOL/L — SIGNIFICANT CHANGE UP (ref 22–31)
CREAT SERPL-MCNC: 0.42 MG/DL — LOW (ref 0.5–1.3)
CULTURE RESULTS: SIGNIFICANT CHANGE UP
EGFR: 103 ML/MIN/1.73M2 — SIGNIFICANT CHANGE UP
GLUCOSE BLDC GLUCOMTR-MCNC: 151 MG/DL — HIGH (ref 70–99)
GLUCOSE BLDC GLUCOMTR-MCNC: 165 MG/DL — HIGH (ref 70–99)
GLUCOSE BLDC GLUCOMTR-MCNC: 175 MG/DL — HIGH (ref 70–99)
GLUCOSE BLDC GLUCOMTR-MCNC: 45 MG/DL — CRITICAL LOW (ref 70–99)
GLUCOSE BLDC GLUCOMTR-MCNC: 49 MG/DL — CRITICAL LOW (ref 70–99)
GLUCOSE BLDC GLUCOMTR-MCNC: 90 MG/DL — SIGNIFICANT CHANGE UP (ref 70–99)
GLUCOSE SERPL-MCNC: 103 MG/DL — HIGH (ref 70–99)
HCT VFR BLD CALC: 31.8 % — LOW (ref 34.5–45)
HGB BLD-MCNC: 9.1 G/DL — LOW (ref 11.5–15.5)
MAGNESIUM SERPL-MCNC: 1.9 MG/DL — SIGNIFICANT CHANGE UP (ref 1.6–2.6)
MCHC RBC-ENTMCNC: 22.9 PG — LOW (ref 27–34)
MCHC RBC-ENTMCNC: 28.6 GM/DL — LOW (ref 32–36)
MCV RBC AUTO: 79.9 FL — LOW (ref 80–100)
NRBC # BLD: 0 /100 WBCS — SIGNIFICANT CHANGE UP (ref 0–0)
ORGANISM # SPEC MICROSCOPIC CNT: SIGNIFICANT CHANGE UP
ORGANISM # SPEC MICROSCOPIC CNT: SIGNIFICANT CHANGE UP
PHOSPHATE SERPL-MCNC: 3.7 MG/DL — SIGNIFICANT CHANGE UP (ref 2.5–4.5)
PLATELET # BLD AUTO: 289 K/UL — SIGNIFICANT CHANGE UP (ref 150–400)
POTASSIUM SERPL-MCNC: 3.7 MMOL/L — SIGNIFICANT CHANGE UP (ref 3.5–5.3)
POTASSIUM SERPL-SCNC: 3.7 MMOL/L — SIGNIFICANT CHANGE UP (ref 3.5–5.3)
PROT SERPL-MCNC: 6.9 G/DL — SIGNIFICANT CHANGE UP (ref 6–8.3)
RBC # BLD: 3.98 M/UL — SIGNIFICANT CHANGE UP (ref 3.8–5.2)
RBC # FLD: 22.1 % — HIGH (ref 10.3–14.5)
SODIUM SERPL-SCNC: 141 MMOL/L — SIGNIFICANT CHANGE UP (ref 135–145)
SPECIMEN SOURCE: SIGNIFICANT CHANGE UP
WBC # BLD: 14.03 K/UL — HIGH (ref 3.8–10.5)
WBC # FLD AUTO: 14.03 K/UL — HIGH (ref 3.8–10.5)

## 2022-11-05 PROCEDURE — 99232 SBSQ HOSP IP/OBS MODERATE 35: CPT

## 2022-11-05 PROCEDURE — 71045 X-RAY EXAM CHEST 1 VIEW: CPT | Mod: 26

## 2022-11-05 PROCEDURE — 99291 CRITICAL CARE FIRST HOUR: CPT | Mod: 24

## 2022-11-05 RX ADMIN — CHLORHEXIDINE GLUCONATE 1 APPLICATION(S): 213 SOLUTION TOPICAL at 05:48

## 2022-11-05 RX ADMIN — Medication 1 APPLICATION(S): at 22:34

## 2022-11-05 RX ADMIN — APIXABAN 5 MILLIGRAM(S): 2.5 TABLET, FILM COATED ORAL at 17:24

## 2022-11-05 RX ADMIN — Medication 50 MILLIGRAM(S): at 05:49

## 2022-11-05 RX ADMIN — Medication 1 TABLET(S): at 12:19

## 2022-11-05 RX ADMIN — MEXILETINE HYDROCHLORIDE 200 MILLIGRAM(S): 150 CAPSULE ORAL at 22:22

## 2022-11-05 RX ADMIN — Medication 5 MILLIGRAM(S): at 12:58

## 2022-11-05 RX ADMIN — Medication 12.5 MILLIGRAM(S): at 06:52

## 2022-11-05 RX ADMIN — Medication 1 DROP(S): at 07:51

## 2022-11-05 RX ADMIN — DORZOLAMIDE HYDROCHLORIDE 1 DROP(S): 20 SOLUTION/ DROPS OPHTHALMIC at 22:26

## 2022-11-05 RX ADMIN — APIXABAN 5 MILLIGRAM(S): 2.5 TABLET, FILM COATED ORAL at 05:23

## 2022-11-05 RX ADMIN — MAGNESIUM OXIDE 400 MG ORAL TABLET 400 MILLIGRAM(S): 241.3 TABLET ORAL at 05:23

## 2022-11-05 RX ADMIN — HUMAN INSULIN 10 UNIT(S): 100 INJECTION, SUSPENSION SUBCUTANEOUS at 12:20

## 2022-11-05 RX ADMIN — PANTOPRAZOLE SODIUM 40 MILLIGRAM(S): 20 TABLET, DELAYED RELEASE ORAL at 12:18

## 2022-11-05 RX ADMIN — Medication 1 APPLICATION(S): at 05:48

## 2022-11-05 RX ADMIN — Medication 4 MILLILITER(S): at 18:01

## 2022-11-05 RX ADMIN — Medication 12.5 MILLIGRAM(S): at 17:24

## 2022-11-05 RX ADMIN — Medication 50 MILLIGRAM(S): at 12:57

## 2022-11-05 RX ADMIN — MAGNESIUM OXIDE 400 MG ORAL TABLET 400 MILLIGRAM(S): 241.3 TABLET ORAL at 22:23

## 2022-11-05 RX ADMIN — MEXILETINE HYDROCHLORIDE 200 MILLIGRAM(S): 150 CAPSULE ORAL at 12:57

## 2022-11-05 RX ADMIN — MAGNESIUM OXIDE 400 MG ORAL TABLET 400 MILLIGRAM(S): 241.3 TABLET ORAL at 12:58

## 2022-11-05 RX ADMIN — Medication 250 MILLIGRAM(S): at 12:57

## 2022-11-05 RX ADMIN — Medication 1 APPLICATION(S): at 13:47

## 2022-11-05 RX ADMIN — Medication 4 MILLILITER(S): at 05:37

## 2022-11-05 RX ADMIN — Medication 500 MILLIGRAM(S): at 12:19

## 2022-11-05 RX ADMIN — Medication 50 MILLIGRAM(S): at 22:23

## 2022-11-05 RX ADMIN — FLUCONAZOLE 150 MILLIGRAM(S): 150 TABLET ORAL at 22:24

## 2022-11-05 RX ADMIN — Medication 3 MILLILITER(S): at 05:37

## 2022-11-05 RX ADMIN — Medication 0.5 MILLIGRAM(S): at 18:01

## 2022-11-05 RX ADMIN — Medication 8 MILLIGRAM(S): at 05:24

## 2022-11-05 RX ADMIN — DORZOLAMIDE HYDROCHLORIDE 1 DROP(S): 20 SOLUTION/ DROPS OPHTHALMIC at 13:47

## 2022-11-05 RX ADMIN — NYSTATIN CREAM 1 APPLICATION(S): 100000 CREAM TOPICAL at 05:25

## 2022-11-05 RX ADMIN — Medication 0.5 MILLIGRAM(S): at 05:37

## 2022-11-05 RX ADMIN — Medication 2: at 12:20

## 2022-11-05 RX ADMIN — Medication 5 MILLIGRAM(S): at 05:24

## 2022-11-05 RX ADMIN — Medication 250 MILLIGRAM(S): at 22:24

## 2022-11-05 RX ADMIN — Medication 1 APPLICATION(S): at 13:46

## 2022-11-05 RX ADMIN — NYSTATIN CREAM 1 APPLICATION(S): 100000 CREAM TOPICAL at 22:25

## 2022-11-05 RX ADMIN — Medication 3 MILLILITER(S): at 13:04

## 2022-11-05 RX ADMIN — DORZOLAMIDE HYDROCHLORIDE 1 DROP(S): 20 SOLUTION/ DROPS OPHTHALMIC at 05:25

## 2022-11-05 RX ADMIN — Medication 3 MILLILITER(S): at 18:01

## 2022-11-05 RX ADMIN — Medication 2: at 17:23

## 2022-11-05 RX ADMIN — MEXILETINE HYDROCHLORIDE 200 MILLIGRAM(S): 150 CAPSULE ORAL at 05:23

## 2022-11-05 RX ADMIN — SODIUM CHLORIDE 10 MILLILITER(S): 9 INJECTION INTRAMUSCULAR; INTRAVENOUS; SUBCUTANEOUS at 23:11

## 2022-11-05 RX ADMIN — Medication 5 MILLIGRAM(S): at 22:23

## 2022-11-05 NOTE — PROGRESS NOTE ADULT - SUBJECTIVE AND OBJECTIVE BOX
Patient seen and examined at the bedside.    Remained critically ill on continuous ICU monitoring.    OBJECTIVE:  Vital Signs Last 24 Hrs  T(C): 37.1 (04 Nov 2022 16:00), Max: 37.1 (04 Nov 2022 16:00)  T(F): 98.8 (04 Nov 2022 16:00), Max: 98.8 (04 Nov 2022 16:00)  HR: 77 (05 Nov 2022 08:00) (66 - 96)  BP: 124/60 (05 Nov 2022 08:00) (121/60 - 156/70)  BP(mean): 87 (05 Nov 2022 08:00) (86 - 101)  RR: 23 (05 Nov 2022 08:00) (17 - 50)  SpO2: 97% (05 Nov 2022 08:00) (97% - 100%)    Parameters below as of 05 Nov 2022 03:03  Patient On (Oxygen Delivery Method): tracheostomy collar    O2 Concentration (%): 40      Physical Exam:   General: Trach collar, NAD  Neurology: Nonfocal, responds to commands. Moves all extremities  ENT/Neck: + trach c/d/i, neck supple, trachea midline   Respiratory: coarse BS b/l, rhonchi throughout, minimal secretions present  CV: S1S2, no murmurs        [x] Sinus Rhythm   Abdominal: Soft, NT, ND, colostomy in place (stoma intact)  Extremities: nonpitting edema, improved. Right elbow wound vac c/d/i. warm and well-perfused.  Skin: Dry dressing over right heel. No cyanosis                 Assessment:  71F PMH DM, COPD, chronic adrenal insufficiency on Prednisone, history of colorectal cancer s/p resection (colostomy bag), Hx of CAD, chronic AF on Eliquis, and tracheomalacia s/p multiple intubations, recent dx of R foot OM s/p debridement on antibiotics and presented to ED at [facility] this morning with epigastric pain, belching and central chest pain. Found on CTA to have type A aortic dissection and transferred to [facility] for surgical evaluation by Dr. Cabrera.     Ascending aortic dissection s/p type A dissection repair and Biobentall on 9/7   Respiratory failure s/p Trach 10/10/22  Hypovolemia   Post op respiratory failure   Acute blood loss anemia  Stress hyperglycemia   RIJ thrombus  MRSA PNA      Plan:   ***Neuro***  [x] Nonfocal  follows commands, continue to monitor  PT/OT progress as tolerated, ambulated with physical therapy    ***Cardiovascular***  Limited TTE on 10/1: EF 69%, Hyperdynamic left ventricular systolic function. There is hypokinesis of the mid-base inferior wall. Nml RV fxn.   CT chest on 9/28: No CT evidence of pulmonary embolism. Status post repair of ascending aortic dissection with a stable dissection flap originating from the aortic arch and extending into the infrarenal abdominal aorta,  B/l UE duplex on 10/5: As before, there is an occluded RIGHT IJ vein with thrombosis extending to brachiocephalic vein. Superficial thrombophlebitis of the LEFT cephalic vein.   Invasive hemodynamic monitoring, assess perfusion indices   SR / MAP 84 / Hct 31.8% / Lactate 1.5  Continuos reassessment of hemodynamics   Hydralazine PO for BP management   Rate control with Lopressor   WVAC on rt elbow changed yesterday  [x] AC Therapy with Eliquis 5 BID for Afib and IJ thrombus  Serial EKG and cardiac enzymes     ***Pulmonary***  Trach collar 10L/40% since 10/25, continue  Respiratory failure s/p Trach #8 portex  10/10/22, per ENT inner cannula needs to be changed daily, trach sutures d/c'ed by ENT 10/17 , 40% trach collar for 2 days  Titration of FiO2, follow SpO2, CXR, blood gases   Bronched 10/13  Continue duonebs  Suction q2hrly as needed   Aggressive Pulmonary toilet  Downsized to 7 cuffless trach 11/3    ***GI***  [x] Diet: TFs Glucerna 1.2 @ goal 65ml/hr, tolerating without issues (when glucerna 1.5 back in stock will switch and decrease goal rate to 55ml/hr)  [x] Protonix    Reglan for gut motility     ***Renal***  Continue to monitor I/Os, BUN/Creatinine.   Replete lytes PRN    ***ID***  SCx on 10/4+Methicillin resistant Staphylococcus aureus, c/w IVPB Vancomycin, (plan for 6 week course in setting of valve surgery, 11/26 end date)  9/27 blood culture Neela Glabarata, on flucanazole (lifelong suppression)  BCx 10/13 NGTD, BC 10/21 and SCx NGTD  R elbow wound, S/p IR for elbow drainage 10/13 + Few Proteus mirabilis ESBL, Meropenem 7 day trial completed 10/19-> reordered 10/20 for reaccumulation of elbow bursitis-  10/22 plastics debrided R elbow eschar to subc tissue, ~8cc fluid aspirated and sent for culture. Wet to dry dressing changed 2x daily. PT changed vac yesterday  Joint Fluid Cx 10/22 NTD  Meropenem for 8 day course (until 10/28),started in setting of elbow collection 10/22,Course completed.    ***Endocrine***  [x]  DM : HbA1c 9.4%                - [x] ISS  [x] NPH              - Need tight glycemic control to prevent wound infection.  Endocrine following, appreciate recommendations          Patient requires continuous monitoring with bedside rhythm monitoring, pulse oximetry monitoring, and continuous central venous and arterial pressure monitoring; and intermittent blood gas analysis. Care plan discussed with the ICU care team.   Patient remained critical, at risk for life threatening decompensation.    I have spent 30 minutes providing critical care management to this patient.    By signing my name below, I, Maria D'Amico, attest that this documentation has been prepared under the direction and in the presence of KIANA Mckoy   Electronically signed: Maria D'Amico, Scribe, 11-05-22 @ 08:33    I, KIANA Mckoy , personally performed the services described in this documentation. all medical record entries made by the scribe were at my direction and in my presence. I have reviewed the chart and agree that the record reflects my personal performance and is accurate and complete  Electronically signed: KIANA Mckoy  Patient seen and examined at the bedside.    Remained critically ill on continuous ICU monitoring.    OBJECTIVE:  Vital Signs Last 24 Hrs  T(C): 37.1 (04 Nov 2022 16:00), Max: 37.1 (04 Nov 2022 16:00)  T(F): 98.8 (04 Nov 2022 16:00), Max: 98.8 (04 Nov 2022 16:00)  HR: 77 (05 Nov 2022 08:00) (66 - 96)  BP: 124/60 (05 Nov 2022 08:00) (121/60 - 156/70)  BP(mean): 87 (05 Nov 2022 08:00) (86 - 101)  RR: 23 (05 Nov 2022 08:00) (17 - 50)  SpO2: 97% (05 Nov 2022 08:00) (97% - 100%)    Parameters below as of 05 Nov 2022 03:03  Patient On (Oxygen Delivery Method): tracheostomy collar    O2 Concentration (%): 40      Physical Exam:   General: Trach collar, NAD  Neurology: Nonfocal, responds to commands. Moves all extremities  ENT/Neck: + trach c/d/i, neck supple, trachea midline   Respiratory: coarse BS b/l, rhonchi throughout, minimal secretions present  CV: S1S2, no murmurs        [x] Sinus Rhythm   Abdominal: Soft, NT, ND, colostomy in place (stoma intact)  Extremities: nonpitting edema, improved. Right elbow wound vac c/d/i. warm and well-perfused.  Skin: Dry dressing over right heel. No cyanosis                 Assessment:  71F PMH DM, COPD, chronic adrenal insufficiency on Prednisone, history of colorectal cancer s/p resection (colostomy bag), Hx of CAD, chronic AF on Eliquis, and tracheomalacia s/p multiple intubations, recent dx of R foot OM s/p debridement on antibiotics and presented to ED at [facility] this morning with epigastric pain, belching and central chest pain. Found on CTA to have type A aortic dissection and transferred to [facility] for surgical evaluation by Dr. Cabrera.     Ascending aortic dissection s/p type A dissection repair and Biobentall on 9/7   Respiratory failure s/p Trach 10/10/22  Hypovolemia   Post op respiratory failure   Acute blood loss anemia  Stress hyperglycemia   RIJ thrombus  MRSA PNA      Plan:   ***Neuro***  [x] Nonfocal  follows commands, continue to monitor  PT/OT progress as tolerated, ambulated with physical therapy  OOBTC    ***Cardiovascular***  Limited TTE on 10/1: EF 69%, Hyperdynamic left ventricular systolic function. There is hypokinesis of the mid-base inferior wall. Nml RV fxn.   CT chest on 9/28: No CT evidence of pulmonary embolism. Status post repair of ascending aortic dissection with a stable dissection flap originating from the aortic arch and extending into the infrarenal abdominal aorta,  B/l UE duplex on 10/5: As before, there is an occluded RIGHT IJ vein with thrombosis extending to brachiocephalic vein. Superficial thrombophlebitis of the LEFT cephalic vein.   Invasive hemodynamic monitoring, assess perfusion indices   SR / MAP 84 / Hct 31.8% / Lactate 1.5  Continuos reassessment of hemodynamics   Hydralazine PO for BP management   Rate control with Lopressor   WVAC on rt elbow changed yesterday  [x] AC Therapy with Eliquis 5 BID for Afib and IJ thrombus  Serial EKG and cardiac enzymes     ***Pulmonary***  Trach collar 10L/40% since 10/25, consult with Dr. Cabrera about when to take off TC  Respiratory failure s/p Trach #8 portex  10/10/22, per ENT inner cannula needs to be changed daily, trach sutures d/c'ed by ENT 10/17 , 40% trach collar for 2 days  Titration of FiO2, follow SpO2, CXR, blood gases   Bronched 10/13  Continue duonebs  Suction q2hrly as needed   Aggressive Pulmonary toilet  Downsized to 7 cuffless trach 11/3    ***GI***  [x] Diet: TFs Glucerna 1.2 @ goal 65ml/hr, tolerating without issues (when glucerna 1.5 back in stock will switch and decrease goal rate to 55ml/hr)  [x] Protonix    Reglan for gut motility     ***Renal***  Continue to monitor I/Os, BUN/Creatinine.   Replete lytes PRN    ***ID***  SCx on 10/4+Methicillin resistant Staphylococcus aureus, c/w IVPB Vancomycin, (plan for 6 week course in setting of valve surgery, 11/26 end date)  9/27 blood culture Neela Glabarata, on flucanazole (lifelong suppression)  BCx 10/13 NGTD, BC 10/21 and SCx NGTD  R elbow wound, S/p IR for elbow drainage 10/13 + Few Proteus mirabilis ESBL, Meropenem 7 day trial completed 10/19-> reordered 10/20 for reaccumulation of elbow bursitis-  10/22 plastics debrided R elbow eschar to subc tissue, ~8cc fluid aspirated and sent for culture. Wet to dry dressing changed 2x daily. PT changed vac yesterday  Joint Fluid Cx 10/22 NTD  Meropenem for 8 day course (until 10/28),started in setting of elbow collection 10/22,Course completed.    ***Endocrine***  [x]  DM : HbA1c 9.4%                - [x] ISS  [x] NPH              - Need tight glycemic control to prevent wound infection.  Endocrine following, appreciate recommendations          Patient requires continuous monitoring with bedside rhythm monitoring, pulse oximetry monitoring, and continuous central venous and arterial pressure monitoring; and intermittent blood gas analysis. Care plan discussed with the ICU care team.   Patient remained critical, at risk for life threatening decompensation.    I have spent 30 minutes providing critical care management to this patient.    By signing my name below, I, Maria D'Amico, attest that this documentation has been prepared under the direction and in the presence of KIANA Mckoy   Electronically signed: Maria D'Amico, Scribe, 11-05-22 @ 08:33    I, KIANA Mckoy , personally performed the services described in this documentation. all medical record entries made by the scribe were at my direction and in my presence. I have reviewed the chart and agree that the record reflects my personal performance and is accurate and complete  Electronically signed: KIANA Mckoy  Patient seen and examined at the bedside.    Remained critically ill on continuous ICU monitoring.    OBJECTIVE:  Vital Signs Last 24 Hrs  T(C): 37.1 (04 Nov 2022 16:00), Max: 37.1 (04 Nov 2022 16:00)  T(F): 98.8 (04 Nov 2022 16:00), Max: 98.8 (04 Nov 2022 16:00)  HR: 77 (05 Nov 2022 08:00) (66 - 96)  BP: 124/60 (05 Nov 2022 08:00) (121/60 - 156/70)  BP(mean): 87 (05 Nov 2022 08:00) (86 - 101)  RR: 23 (05 Nov 2022 08:00) (17 - 50)  SpO2: 97% (05 Nov 2022 08:00) (97% - 100%)    Parameters below as of 05 Nov 2022 03:03  Patient On (Oxygen Delivery Method): tracheostomy collar    O2 Concentration (%): 40      Physical Exam:   General: Trach collar, NAD  Neurology: Nonfocal, responds to commands. Moves all extremities  ENT/Neck: + trach c/d/i, neck supple, trachea midline   Respiratory: coarse BS b/l, rhonchi throughout, minimal secretions present  CV: S1S2, no murmurs        [x] Sinus Rhythm   Abdominal: Soft, NT, ND, colostomy in place (stoma intact)  Extremities: nonpitting edema, improved. Right elbow wound vac c/d/i. warm and well-perfused.  Skin: Dry dressing over right heel. No cyanosis                 Assessment:  71F PMH DM, COPD, chronic adrenal insufficiency on Prednisone, history of colorectal cancer s/p resection (colostomy bag), Hx of CAD, chronic AF on Eliquis, and tracheomalacia s/p multiple intubations, recent dx of R foot OM s/p debridement on antibiotics and presented to ED at [facility] this morning with epigastric pain, belching and central chest pain. Found on CTA to have type A aortic dissection and transferred to [facility] for surgical evaluation by Dr. Cabrera.     Ascending aortic dissection s/p type A dissection repair and Biobentall on 9/7   Respiratory failure s/p Trach 10/10/22  Hypovolemia   Post op respiratory failure   Acute blood loss anemia  Stress hyperglycemia   RIJ thrombus  MRSA PNA      Plan:   ***Neuro***  [x] Nonfocal  follows commands, continue to monitor  PT/OT progress as tolerated, ambulated with physical therapy  OOBTC    ***Cardiovascular***  Limited TTE on 10/1: EF 69%, Hyperdynamic left ventricular systolic function. There is hypokinesis of the mid-base inferior wall. Nml RV fxn.   CT chest on 9/28: No CT evidence of pulmonary embolism. Status post repair of ascending aortic dissection with a stable dissection flap originating from the aortic arch and extending into the infrarenal abdominal aorta,  B/l UE duplex on 10/5: As before, there is an occluded RIGHT IJ vein with thrombosis extending to brachiocephalic vein. Superficial thrombophlebitis of the LEFT cephalic vein.   Invasive hemodynamic monitoring, assess perfusion indices   SR / MAP 84 / Hct 31.8% / Lactate 1.5  Continuos reassessment of hemodynamics   Hydralazine PO for BP management   Rate control with Lopressor   WVAC on rt elbow changed yesterday  [x] AC Therapy with Eliquis 5 BID for Afib and IJ thrombus      ***Pulmonary***  Trach collar 10L/40% since 10/25, consult with Dr. Cabrera about when to decannulate  Respiratory failure s/p Trach #8 portex  10/10/22, per ENT inner cannula needs to be changed daily, trach sutures d/c'ed by ENT 10/17 , 40% trach collar for 2 days  Titration of FiO2, follow SpO2, CXR, blood gases   Bronched 10/13  Continue duonebs  Suction q2hrly as needed   Aggressive Pulmonary toilet  Downsized to 7 cuffless trach 11/3    ***GI***  [x] Diet: TFs Glucerna 1.2 @ goal 65ml/hr, tolerating without issues (when glucerna 1.5 back in stock will switch and decrease goal rate to 55ml/hr)  [x] Protonix    Reglan for gut motility     ***Renal***  Continue to monitor I/Os, BUN/Creatinine.   Replete lytes PRN    ***ID***  SCx on 10/4+Methicillin resistant Staphylococcus aureus, c/w IVPB Vancomycin, (plan for 6 week course in setting of valve surgery, 11/26 end date)  9/27 blood culture Neela Glabarata, on flucanazole (lifelong suppression)  BCx 10/13 NGTD, BC 10/21 and SCx NGTD  R elbow wound, S/p IR for elbow drainage 10/13 + Few Proteus mirabilis ESBL, Meropenem 7 day trial completed 10/19-> reordered 10/20 for reaccumulation of elbow bursitis-  10/22 plastics debrided R elbow eschar to subc tissue, ~8cc fluid aspirated and sent for culture. Wet to dry dressing changed 2x daily. PT changed vac yesterday  Joint Fluid Cx 10/22 - p. mirabilis  Meropenem for 8 day course (until 10/28),started in setting of elbow collection 10/22,Course completed.    ***Endocrine***  [x]  DM : HbA1c 9.4%                - [x] ISS  [x] NPH              - Need tight glycemic control to prevent wound infection.  Endocrine following, appreciate recommendations          Patient requires continuous monitoring with bedside rhythm monitoring, pulse oximetry monitoring, and continuous central venous and arterial pressure monitoring; and intermittent blood gas analysis. Care plan discussed with the ICU care team.   Patient remained critical, at risk for life threatening decompensation.    I have spent 30 minutes providing critical care management to this patient.    By signing my name below, I, Maria D'Amico, attest that this documentation has been prepared under the direction and in the presence of KIANA Mckoy   Electronically signed: Maria D'Amico, Scribe, 11-05-22 @ 08:33    I, KIANA Mckoy , personally performed the services described in this documentation. all medical record entries made by the scribe were at my direction and in my presence. I have reviewed the chart and agree that the record reflects my personal performance and is accurate and complete  Electronically signed: KIANA Mckoy

## 2022-11-05 NOTE — PROGRESS NOTE ADULT - NSPROGADDITIONALINFOA_GEN_ALL_CORE
27 minutes spent on the development of plan of care/coordination of care/glycemic control through review of labs, blood glucose values and vital signs.

## 2022-11-05 NOTE — PROGRESS NOTE ADULT - ASSESSMENT
72 F w/h/o uncontrolled T2DM (A1C 9.4%) on basal/bolus insulin PTA. Unknown DM complications. Also COPD, secondary adrenal Insufficiency on chronic steroids, colorectal cancer s/p resection (colostomy bag), Chronic A fib on Eliquis, and tracheomalacia and multiple intubations, recent dx of OM presented to ED at [facility] with epigastric pain, belching and central chest pain. Found on CTA to have type A aortic dissection and transferred to [facility] for surgical evaluation by Dr. Cabrera. Now s/p aortic dissection repair on 9/07/22. Endocrine consulted for assistance with uncontrolled DM/steroids for AI. Pt in ICU with ventricular dysfunction/sepsis, and now s/p trach 10/10 and more recently s/p R elbow eschar debridement 10/22 > Wound VAC 10/24.  On Prednisone dose 8mg for AI. Tolerating TF @ goal rate w/hypoglycemia to 40s overnight last night likely from high AM NPH doses in AM w/lower insulin requirements as steroid dose wears off. At risk today for severe hyperglycemia as 6am NPH dose held after receiving steroids w/TF infusing. Will adjust regimen based on 24 hour pattern. BG goal (100-180mg/dl).  PAST MEDICAL HISTORY:  Anemia bitamin b12 deficiency    Anxiety States     Benign Hypertension     Carcinoid Tumor of Ovary, Benign      Deliv     Depression with anxiety     Hypertension Dx     Ovarian cyst bilateral    Ovarian Cyst     PTSD (post-traumatic stress disorder)     Syndrome X (cardiac)     Tubal Ligation

## 2022-11-05 NOTE — PROGRESS NOTE ADULT - PROBLEM SELECTOR PLAN 3
Off rosuvastatin 5mg daily  Re start if not contraindicated and as per cardiology  -F/u levels as out pt.      discussed w/pt and CT sx team  Can be reached via TEAMS/pager: [phone]   office:  [phone] (M-F 9a-5pm)               [phone] (nights/weekends)   [email or web address] password [email or web address]

## 2022-11-05 NOTE — PROGRESS NOTE ADULT - SUBJECTIVE AND OBJECTIVE BOX
Diabetes Follow up note:    Chief complaint: T2DM w/steroid induced hyperglycemia    Interval Hx: Pt w/overnight low to 40s. No noted interruption in TFs overnight per documentation/covering RN. BG 90mg/dl at 6am today w/NPH dose held. Pt seen at bedside. Remains at goal rate of Glucerna 65 cc/hr.     Review of Systems:  General: denies pain  GI: Tolerating TFs. Denies N/V/D/Abd pain  CV: Denies CP/SOB  ENDO: No S&Sx of hypoglycemia    MEDS:  insulin lispro (ADMELOG) corrective regimen sliding scale   SubCutaneous every 6 hours  insulin NPH human recombinant 46 Unit(s) SubCutaneous <User Schedule>  insulin NPH human recombinant 10 Unit(s) SubCutaneous <User Schedule>  insulin NPH human recombinant 8 Unit(s) SubCutaneous <User Schedule>  insulin NPH human recombinant 6 Unit(s) SubCutaneous <User Schedule>  methylPREDNISolone 8 milliGRAM(s) Oral daily    fluconAZOLE IVPB 600 milliGRAM(s) IV Intermittent every 24 hours  vancomycin  IVPB 750 milliGRAM(s) IV Intermittent every 12 hours  vancomycin  IVPB        Allergies    aspirin (Short breath)  Avelox (Short breath; Pruritus)  cefepime (Anaphylaxis)  codeine (Short breath)  Dilaudid (Short breath)  iodine (Short breath; Swelling)  penicillin (Anaphylaxis)  shellfish (Anaphylaxis)  tetanus toxoid (Short breath)  Valium (Short breath)        PE:  General: Female lying in bed. NAD.   Vital Signs Last 24 Hrs  T(C): 37.1 (04 Nov 2022 16:00), Max: 37.1 (04 Nov 2022 16:00)  T(F): 98.8 (04 Nov 2022 16:00), Max: 98.8 (04 Nov 2022 16:00)  HR: 78 (05 Nov 2022 09:48) (66 - 86)  BP: 124/60 (05 Nov 2022 08:00) (121/60 - 156/70)  BP(mean): 87 (05 Nov 2022 08:00) (86 - 101)  RR: 26 (05 Nov 2022 09:48) (17 - 50)  SpO2: 100% (05 Nov 2022 09:48) (97% - 100%)    Parameters below as of 05 Nov 2022 09:48  Patient On (Oxygen Delivery Method): tracheostomy collar  O2 Flow (L/min): 10  O2 Concentration (%): 40  HEENT: Trach in place. nG tube in place.   Abd: Soft, NT,ND,   Extremities: Warm  Neuro: Alert. Awake    LABS:  POCT Blood Glucose.: 90 mg/dL (11-05-22 @ 05:21)  POCT Blood Glucose.: 165 mg/dL (11-05-22 @ 02:19)  POCT Blood Glucose.: 49 mg/dL (11-05-22 @ 01:51)  POCT Blood Glucose.: 45 mg/dL (11-05-22 @ 01:47)  POCT Blood Glucose.: 273 mg/dL (11-04-22 @ 17:31)  POCT Blood Glucose.: 214 mg/dL (11-04-22 @ 13:38)  POCT Blood Glucose.: 153 mg/dL (11-04-22 @ 00:43)  POCT Blood Glucose.: 228 mg/dL (11-03-22 @ 18:20)  POCT Blood Glucose.: 229 mg/dL (11-03-22 @ 10:39)  POCT Blood Glucose.: 169 mg/dL (11-03-22 @ 06:20)  POCT Blood Glucose.: 138 mg/dL (11-03-22 @ 00:35)  POCT Blood Glucose.: 84 mg/dL (11-02-22 @ 18:12)  POCT Blood Glucose.: 207 mg/dL (11-02-22 @ 12:41)                            9.1    14.03 )-----------( 289      ( 05 Nov 2022 03:34 )             31.8       11-05    141  |  103  |  20  ----------------------------<  103<H>  3.7   |  29  |  0.42<L>    eGFR: 103    Ca    9.3      11-05  Mg     1.9     11-05  Phos  3.7     11-05    TPro  6.9  /  Alb  3.0<L>  /  TBili  0.3  /  DBili  x   /  AST  21  /  ALT  18  /  AlkPhos  120  11-05      A1C with Estimated Average Glucose Result: 9.4 % (09-06-22 @ 16:46)  A1C with Estimated Average Glucose Result: 9.2 % (07-11-22 @ 07:36)  A1C with Estimated Average Glucose Result: 8.0 % (05-10-22 @ 12:26)  A1C with Estimated Average Glucose Result: 9.5 % (03-29-22 @ 13:50)  A1C with Estimated Average Glucose Result: 7.8 % (11-30-21 @ 09:05)          Contact number: karoline [phone] or [phone]

## 2022-11-05 NOTE — PROGRESS NOTE ADULT - PROBLEM SELECTOR PLAN 1
-test BG q6h if NPO/TF   -Hypoglycemic at midnight last night likely 2/2 action of high dose of NPH early in day. Would discontinue 6pm dose daily for now. Did not receive 6am dose today so at risk for rebound hyperglycemia today.   12am: c/w NPH 8 units   6am: c/w NPH 46 units. Give 50% of dose if BG less than 120mg/dl. DO NOT HOLD IF RECEIVING TF  12pm: c/w NPH 10 units   6pm: Discontinue NPH dose for now.   -If TFs off after NPH is given please start D5 infusion at TF rate to prevent rebound hypoglycemia.   -C/w Admelog moderate correction scale q6h for now  Discharge plan:  - Likely to discharge patient on basal/bolus insulin. Final regimen pending clinical course.  - Recommend routine outpatient ophthalmology, podiatry  - Can f/u with endocrinologist Dr. Saavedra.  - Make sure pt has Rx for all DM supplies and insulin/ DM meds.  -Based on pt's clinical condition and mental status at this time she is not able to independently manage her DM. Will evaluate pt's ability to manage DM at time of discharge. If unable> pt will need family/ care giver (s) to manage DM care at home  -Will need rehab.

## 2022-11-06 LAB
ALBUMIN SERPL ELPH-MCNC: 2.8 G/DL — LOW (ref 3.3–5)
ALP SERPL-CCNC: 116 U/L — SIGNIFICANT CHANGE UP (ref 40–120)
ALT FLD-CCNC: 18 U/L — SIGNIFICANT CHANGE UP (ref 10–45)
ANION GAP SERPL CALC-SCNC: 11 MMOL/L — SIGNIFICANT CHANGE UP (ref 5–17)
AST SERPL-CCNC: 32 U/L — SIGNIFICANT CHANGE UP (ref 10–40)
BILIRUB SERPL-MCNC: 0.2 MG/DL — SIGNIFICANT CHANGE UP (ref 0.2–1.2)
BLD GP AB SCN SERPL QL: NEGATIVE — SIGNIFICANT CHANGE UP
BUN SERPL-MCNC: 15 MG/DL — SIGNIFICANT CHANGE UP (ref 7–23)
CALCIUM SERPL-MCNC: 9 MG/DL — SIGNIFICANT CHANGE UP (ref 8.4–10.5)
CHLORIDE SERPL-SCNC: 102 MMOL/L — SIGNIFICANT CHANGE UP (ref 96–108)
CO2 SERPL-SCNC: 25 MMOL/L — SIGNIFICANT CHANGE UP (ref 22–31)
CREAT SERPL-MCNC: 0.4 MG/DL — LOW (ref 0.5–1.3)
EGFR: 104 ML/MIN/1.73M2 — SIGNIFICANT CHANGE UP
GLUCOSE BLDC GLUCOMTR-MCNC: 121 MG/DL — HIGH (ref 70–99)
GLUCOSE BLDC GLUCOMTR-MCNC: 127 MG/DL — HIGH (ref 70–99)
GLUCOSE BLDC GLUCOMTR-MCNC: 142 MG/DL — HIGH (ref 70–99)
GLUCOSE BLDC GLUCOMTR-MCNC: 221 MG/DL — HIGH (ref 70–99)
GLUCOSE BLDC GLUCOMTR-MCNC: 65 MG/DL — LOW (ref 70–99)
GLUCOSE BLDC GLUCOMTR-MCNC: 66 MG/DL — LOW (ref 70–99)
GLUCOSE SERPL-MCNC: 120 MG/DL — HIGH (ref 70–99)
HCT VFR BLD CALC: 31.5 % — LOW (ref 34.5–45)
HGB BLD-MCNC: 9.4 G/DL — LOW (ref 11.5–15.5)
MAGNESIUM SERPL-MCNC: 1.8 MG/DL — SIGNIFICANT CHANGE UP (ref 1.6–2.6)
MCHC RBC-ENTMCNC: 23.8 PG — LOW (ref 27–34)
MCHC RBC-ENTMCNC: 29.8 GM/DL — LOW (ref 32–36)
MCV RBC AUTO: 79.7 FL — LOW (ref 80–100)
NRBC # BLD: 0 /100 WBCS — SIGNIFICANT CHANGE UP (ref 0–0)
PHOSPHATE SERPL-MCNC: 3.4 MG/DL — SIGNIFICANT CHANGE UP (ref 2.5–4.5)
PLATELET # BLD AUTO: 271 K/UL — SIGNIFICANT CHANGE UP (ref 150–400)
POTASSIUM SERPL-MCNC: 4.5 MMOL/L — SIGNIFICANT CHANGE UP (ref 3.5–5.3)
POTASSIUM SERPL-SCNC: 4.5 MMOL/L — SIGNIFICANT CHANGE UP (ref 3.5–5.3)
PROT SERPL-MCNC: 6.5 G/DL — SIGNIFICANT CHANGE UP (ref 6–8.3)
RBC # BLD: 3.95 M/UL — SIGNIFICANT CHANGE UP (ref 3.8–5.2)
RBC # FLD: 21.6 % — HIGH (ref 10.3–14.5)
RH IG SCN BLD-IMP: POSITIVE — SIGNIFICANT CHANGE UP
SODIUM SERPL-SCNC: 138 MMOL/L — SIGNIFICANT CHANGE UP (ref 135–145)
VANCOMYCIN TROUGH SERPL-MCNC: 11.3 UG/ML — SIGNIFICANT CHANGE UP (ref 10–20)
WBC # BLD: 13.84 K/UL — HIGH (ref 3.8–10.5)
WBC # FLD AUTO: 13.84 K/UL — HIGH (ref 3.8–10.5)

## 2022-11-06 PROCEDURE — 99291 CRITICAL CARE FIRST HOUR: CPT | Mod: 24

## 2022-11-06 PROCEDURE — 99233 SBSQ HOSP IP/OBS HIGH 50: CPT

## 2022-11-06 PROCEDURE — 71045 X-RAY EXAM CHEST 1 VIEW: CPT | Mod: 26

## 2022-11-06 PROCEDURE — 71045 X-RAY EXAM CHEST 1 VIEW: CPT | Mod: 26,77

## 2022-11-06 RX ORDER — MAGNESIUM SULFATE 500 MG/ML
1 VIAL (ML) INJECTION ONCE
Refills: 0 | Status: COMPLETED | OUTPATIENT
Start: 2022-11-06 | End: 2022-11-06

## 2022-11-06 RX ORDER — DIPHENHYDRAMINE HCL 50 MG
12.5 CAPSULE ORAL ONCE
Refills: 0 | Status: COMPLETED | OUTPATIENT
Start: 2022-11-06 | End: 2022-11-06

## 2022-11-06 RX ORDER — DEXTROSE 50 % IN WATER 50 %
25 SYRINGE (ML) INTRAVENOUS ONCE
Refills: 0 | Status: COMPLETED | OUTPATIENT
Start: 2022-11-06 | End: 2022-11-06

## 2022-11-06 RX ADMIN — Medication 0: at 17:21

## 2022-11-06 RX ADMIN — Medication 50 MILLIGRAM(S): at 06:27

## 2022-11-06 RX ADMIN — MEXILETINE HYDROCHLORIDE 200 MILLIGRAM(S): 150 CAPSULE ORAL at 06:26

## 2022-11-06 RX ADMIN — Medication 500 MILLIGRAM(S): at 12:33

## 2022-11-06 RX ADMIN — Medication 12.5 MILLIGRAM(S): at 06:31

## 2022-11-06 RX ADMIN — CHLORHEXIDINE GLUCONATE 1 APPLICATION(S): 213 SOLUTION TOPICAL at 06:27

## 2022-11-06 RX ADMIN — Medication 5 MILLIGRAM(S): at 13:48

## 2022-11-06 RX ADMIN — Medication 1 APPLICATION(S): at 22:22

## 2022-11-06 RX ADMIN — DORZOLAMIDE HYDROCHLORIDE 1 DROP(S): 20 SOLUTION/ DROPS OPHTHALMIC at 22:12

## 2022-11-06 RX ADMIN — Medication 3 MILLILITER(S): at 23:59

## 2022-11-06 RX ADMIN — Medication 0.5 MILLIGRAM(S): at 17:29

## 2022-11-06 RX ADMIN — Medication 1 APPLICATION(S): at 07:00

## 2022-11-06 RX ADMIN — Medication 3 MILLILITER(S): at 11:13

## 2022-11-06 RX ADMIN — HUMAN INSULIN 10 UNIT(S): 100 INJECTION, SUSPENSION SUBCUTANEOUS at 12:32

## 2022-11-06 RX ADMIN — Medication 8 MILLIGRAM(S): at 06:31

## 2022-11-06 RX ADMIN — Medication 1 DROP(S): at 17:21

## 2022-11-06 RX ADMIN — Medication 12.5 MILLIGRAM(S): at 22:45

## 2022-11-06 RX ADMIN — Medication 50 MILLIGRAM(S): at 13:48

## 2022-11-06 RX ADMIN — HUMAN INSULIN 46 UNIT(S): 100 INJECTION, SUSPENSION SUBCUTANEOUS at 06:32

## 2022-11-06 RX ADMIN — Medication 4: at 12:32

## 2022-11-06 RX ADMIN — FLUCONAZOLE 150 MILLIGRAM(S): 150 TABLET ORAL at 21:50

## 2022-11-06 RX ADMIN — Medication 50 MILLIGRAM(S): at 22:07

## 2022-11-06 RX ADMIN — Medication 5 MILLIGRAM(S): at 22:07

## 2022-11-06 RX ADMIN — Medication 1 APPLICATION(S): at 13:50

## 2022-11-06 RX ADMIN — MAGNESIUM OXIDE 400 MG ORAL TABLET 400 MILLIGRAM(S): 241.3 TABLET ORAL at 06:28

## 2022-11-06 RX ADMIN — MEXILETINE HYDROCHLORIDE 200 MILLIGRAM(S): 150 CAPSULE ORAL at 22:06

## 2022-11-06 RX ADMIN — Medication 5 MILLIGRAM(S): at 06:28

## 2022-11-06 RX ADMIN — Medication 3 MILLILITER(S): at 00:08

## 2022-11-06 RX ADMIN — APIXABAN 5 MILLIGRAM(S): 2.5 TABLET, FILM COATED ORAL at 06:31

## 2022-11-06 RX ADMIN — APIXABAN 5 MILLIGRAM(S): 2.5 TABLET, FILM COATED ORAL at 17:20

## 2022-11-06 RX ADMIN — Medication 3 MILLILITER(S): at 06:11

## 2022-11-06 RX ADMIN — MEXILETINE HYDROCHLORIDE 200 MILLIGRAM(S): 150 CAPSULE ORAL at 13:47

## 2022-11-06 RX ADMIN — NYSTATIN CREAM 1 APPLICATION(S): 100000 CREAM TOPICAL at 06:27

## 2022-11-06 RX ADMIN — Medication 250 MILLIGRAM(S): at 10:37

## 2022-11-06 RX ADMIN — Medication 4 MILLILITER(S): at 17:34

## 2022-11-06 RX ADMIN — DORZOLAMIDE HYDROCHLORIDE 1 DROP(S): 20 SOLUTION/ DROPS OPHTHALMIC at 13:49

## 2022-11-06 RX ADMIN — Medication 4 MILLILITER(S): at 06:10

## 2022-11-06 RX ADMIN — Medication 100 GRAM(S): at 06:26

## 2022-11-06 RX ADMIN — PANTOPRAZOLE SODIUM 40 MILLIGRAM(S): 20 TABLET, DELAYED RELEASE ORAL at 12:34

## 2022-11-06 RX ADMIN — Medication 250 MILLIGRAM(S): at 22:14

## 2022-11-06 RX ADMIN — Medication 1 TABLET(S): at 12:33

## 2022-11-06 RX ADMIN — Medication 3 MILLILITER(S): at 17:29

## 2022-11-06 RX ADMIN — HUMAN INSULIN 8 UNIT(S): 100 INJECTION, SUSPENSION SUBCUTANEOUS at 01:02

## 2022-11-06 RX ADMIN — MAGNESIUM OXIDE 400 MG ORAL TABLET 400 MILLIGRAM(S): 241.3 TABLET ORAL at 13:48

## 2022-11-06 RX ADMIN — Medication 0.5 MILLIGRAM(S): at 06:11

## 2022-11-06 RX ADMIN — NYSTATIN CREAM 1 APPLICATION(S): 100000 CREAM TOPICAL at 17:20

## 2022-11-06 RX ADMIN — Medication 12.5 MILLIGRAM(S): at 17:20

## 2022-11-06 RX ADMIN — MAGNESIUM OXIDE 400 MG ORAL TABLET 400 MILLIGRAM(S): 241.3 TABLET ORAL at 22:11

## 2022-11-06 RX ADMIN — Medication 25 MILLILITER(S): at 17:23

## 2022-11-06 RX ADMIN — Medication 1 APPLICATION(S): at 12:34

## 2022-11-06 NOTE — PROGRESS NOTE ADULT - SUBJECTIVE AND OBJECTIVE BOX
[facility] - Division of Pulmonary, Critical Care and Sleep Medicine   Please call [phone] between 8am-pm weekdays, [phone] after hours and weekends    Interval Events:    REVIEW OF SYSTEMS:  CV: [ ] chest pain   Resp: [ ] cough [ ] shortness of breath   [ ] All other systems negative  [ ] Unable to assess ROS because ________    OBJECTIVE:  ICU Vital Signs Last 24 Hrs  T(C): 36.7 (06 Nov 2022 04:00), Max: 36.7 (06 Nov 2022 04:00)  T(F): 98 (06 Nov 2022 04:00), Max: 98 (06 Nov 2022 04:00)  HR: 80 (06 Nov 2022 07:00) (66 - 86)  BP: 153/72 (06 Nov 2022 06:00) (120/60 - 153/73)  BP(mean): 103 (06 Nov 2022 06:00) (87 - 105)  ABP: --  ABP(mean): --  RR: 29 (06 Nov 2022 07:00) (22 - 54)  SpO2: 99% (06 Nov 2022 06:11) (86% - 100%)    O2 Parameters below as of 06 Nov 2022 06:11  Patient On (Oxygen Delivery Method): tracheostomy collar              11-05 @ 08:01  -  11-06 @ 07:00  --------------------------------------------------------  IN: 2585 mL / OUT: 275 mL / NET: 2310 mL      CAPILLARY BLOOD GLUCOSE      POCT Blood Glucose.: 142 mg/dL (06 Nov 2022 06:24)      PHYSICAL EXAM:  General: NAD  HEENT: NC/AT  Lymph Nodes: no cervical or supraclavicular lymphadenopathy  Neck: supple  Respiratory:  CTA b/l, no wheezes, crackles or rhonchi  Cardiovascular:  RRR, no m/r/g  Abdomen: soft, NT/ND, +BS  Extremities: no clubbing, cyanosis or edema, warm  Skin: no rash  Neurological: AAOx3, non focal exam  Psychiatry: not anxious appearing, normal affect and mood    HOSPITAL MEDICATIONS:  MEDICATIONS  (STANDING):  acetylcysteine 10%  Inhalation 4 milliLiter(s) Inhalation every 12 hours  albuterol/ipratropium for Nebulization 3 milliLiter(s) Nebulizer every 6 hours  apixaban 5 milliGRAM(s) Oral every 12 hours  ascorbic acid 500 milliGRAM(s) Oral daily  buDESOnide    Inhalation Suspension 0.5 milliGRAM(s) Inhalation every 12 hours  chlorhexidine 2% Cloths 1 Application(s) Topical <User Schedule>  collagenase Ointment 1 Application(s) Topical daily  diphenhydramine 2%/zinc acetate 0.1% Cream 1 Application(s) Topical every 8 hours  dorzolamide 2% Ophthalmic Solution 1 Drop(s) Right EYE three times a day  fluconAZOLE IVPB 600 milliGRAM(s) IV Intermittent every 24 hours  hydrALAZINE 50 milliGRAM(s) Oral every 8 hours  insulin lispro (ADMELOG) corrective regimen sliding scale   SubCutaneous every 6 hours  insulin NPH human recombinant 46 Unit(s) SubCutaneous <User Schedule>  insulin NPH human recombinant 10 Unit(s) SubCutaneous <User Schedule>  insulin NPH human recombinant 8 Unit(s) SubCutaneous <User Schedule>  magnesium oxide 400 milliGRAM(s) Oral every 8 hours  methylPREDNISolone 8 milliGRAM(s) Oral daily  metoclopramide Injectable 5 milliGRAM(s) IV Push every 8 hours  metoprolol tartrate 12.5 milliGRAM(s) Oral every 12 hours  mexiletine 200 milliGRAM(s) Oral every 8 hours  multivitamin 1 Tablet(s) Oral daily  nystatin Powder 1 Application(s) Topical two times a day  pantoprazole  Injectable 40 milliGRAM(s) IV Push daily  sodium chloride 0.9%. 1000 milliLiter(s) (10 mL/Hr) IV Continuous <Continuous>  timolol 0.5% Solution 1 Drop(s) Right EYE two times a day  vancomycin  IVPB 750 milliGRAM(s) IV Intermittent every 12 hours  vancomycin  IVPB        MEDICATIONS  (PRN):  acetaminophen    Suspension .. 650 milliGRAM(s) Oral every 6 hours PRN Temp greater or equal to 38C (100.4F), Mild Pain (1 - 3)      LABS:                        9.4    13.84 )-----------( 271      ( 06 Nov 2022 00:50 )             31.5     Hgb Trend: 9.4<--, 9.1<--, 9.2<--, 9.9<--, 9.4<--  11-06    138  |  102  |  15  ----------------------------<  120<H>  4.5   |  25  |  0.40<L>    Ca    9.0      06 Nov 2022 00:50  Phos  3.4     11-06  Mg     1.8     11-06    TPro  6.5  /  Alb  2.8<L>  /  TBili  0.2  /  DBili  x   /  AST  32  /  ALT  18  /  AlkPhos  116  11-06    Creatinine Trend: 0.40<--, 0.42<--, 0.42<--, 0.45<--, 0.44<--, 0.46<--      Arterial Blood Gas:  11-04 @ 12:11  7.42/47/97/30/98.1/5.3  ABG lactate: --        MICROBIOLOGY:     RADIOLOGY:  [x ] Reviewed and interpreted by me   [facility] - Division of Pulmonary, Critical Care and Sleep Medicine   Please call [phone] between 8am-pm weekdays, [phone] after hours and weekends    Interval Events: no events overnight, phonating over trach collar with and without PMV. No complaints     REVIEW OF SYSTEMS:  CV: [ ] chest pain   Resp: [ ] cough [ ] shortness of breath   [x ] All other systems negative  [ ] Unable to assess ROS because ________    OBJECTIVE:  ICU Vital Signs Last 24 Hrs  T(C): 36.7 (06 Nov 2022 04:00), Max: 36.7 (06 Nov 2022 04:00)  T(F): 98 (06 Nov 2022 04:00), Max: 98 (06 Nov 2022 04:00)  HR: 80 (06 Nov 2022 07:00) (66 - 86)  BP: 153/72 (06 Nov 2022 06:00) (120/60 - 153/73)  BP(mean): 103 (06 Nov 2022 06:00) (87 - 105)  ABP: --  ABP(mean): --  RR: 29 (06 Nov 2022 07:00) (22 - 54)  SpO2: 99% (06 Nov 2022 06:11) (86% - 100%)    O2 Parameters below as of 06 Nov 2022 06:11  Patient On (Oxygen Delivery Method): tracheostomy collar              11-05 @ 08:01  -  11-06 @ 07:00  --------------------------------------------------------  IN: 2585 mL / OUT: 275 mL / NET: 2310 mL      CAPILLARY BLOOD GLUCOSE      POCT Blood Glucose.: 142 mg/dL (06 Nov 2022 06:24)      PHYSICAL EXAM:  General: NAD  HEENT: NC/AT  Neck: supple, +trach to TC  Respiratory:  CTA b/l, no wheezes, crackles or rhonchi  Cardiovascular:  RRR, no m/r/g  Abdomen: soft, NT/ND, +BS  Extremities: no clubbing, cyanosis or edema, warm  Skin: no rash  Neurological: AAOx3, non focal exam  Psychiatry: not anxious appearing, normal affect and mood    HOSPITAL MEDICATIONS:  MEDICATIONS  (STANDING):  acetylcysteine 10%  Inhalation 4 milliLiter(s) Inhalation every 12 hours  albuterol/ipratropium for Nebulization 3 milliLiter(s) Nebulizer every 6 hours  apixaban 5 milliGRAM(s) Oral every 12 hours  ascorbic acid 500 milliGRAM(s) Oral daily  buDESOnide    Inhalation Suspension 0.5 milliGRAM(s) Inhalation every 12 hours  chlorhexidine 2% Cloths 1 Application(s) Topical <User Schedule>  collagenase Ointment 1 Application(s) Topical daily  diphenhydramine 2%/zinc acetate 0.1% Cream 1 Application(s) Topical every 8 hours  dorzolamide 2% Ophthalmic Solution 1 Drop(s) Right EYE three times a day  fluconAZOLE IVPB 600 milliGRAM(s) IV Intermittent every 24 hours  hydrALAZINE 50 milliGRAM(s) Oral every 8 hours  insulin lispro (ADMELOG) corrective regimen sliding scale   SubCutaneous every 6 hours  insulin NPH human recombinant 46 Unit(s) SubCutaneous <User Schedule>  insulin NPH human recombinant 10 Unit(s) SubCutaneous <User Schedule>  insulin NPH human recombinant 8 Unit(s) SubCutaneous <User Schedule>  magnesium oxide 400 milliGRAM(s) Oral every 8 hours  methylPREDNISolone 8 milliGRAM(s) Oral daily  metoclopramide Injectable 5 milliGRAM(s) IV Push every 8 hours  metoprolol tartrate 12.5 milliGRAM(s) Oral every 12 hours  mexiletine 200 milliGRAM(s) Oral every 8 hours  multivitamin 1 Tablet(s) Oral daily  nystatin Powder 1 Application(s) Topical two times a day  pantoprazole  Injectable 40 milliGRAM(s) IV Push daily  sodium chloride 0.9%. 1000 milliLiter(s) (10 mL/Hr) IV Continuous <Continuous>  timolol 0.5% Solution 1 Drop(s) Right EYE two times a day  vancomycin  IVPB 750 milliGRAM(s) IV Intermittent every 12 hours  vancomycin  IVPB        MEDICATIONS  (PRN):  acetaminophen    Suspension .. 650 milliGRAM(s) Oral every 6 hours PRN Temp greater or equal to 38C (100.4F), Mild Pain (1 - 3)      LABS:                        9.4    13.84 )-----------( 271      ( 06 Nov 2022 00:50 )             31.5     Hgb Trend: 9.4<--, 9.1<--, 9.2<--, 9.9<--, 9.4<--  11-06    138  |  102  |  15  ----------------------------<  120<H>  4.5   |  25  |  0.40<L>    Ca    9.0      06 Nov 2022 00:50  Phos  3.4     11-06  Mg     1.8     11-06    TPro  6.5  /  Alb  2.8<L>  /  TBili  0.2  /  DBili  x   /  AST  32  /  ALT  18  /  AlkPhos  116  11-06    Creatinine Trend: 0.40<--, 0.42<--, 0.42<--, 0.45<--, 0.44<--, 0.46<--      Arterial Blood Gas:  11-04 @ 12:11  7.42/47/97/30/98.1/5.3  ABG lactate: --        MICROBIOLOGY:     RADIOLOGY:  [x ] Reviewed and interpreted by me

## 2022-11-06 NOTE — PROGRESS NOTE ADULT - SUBJECTIVE AND OBJECTIVE BOX
Patient seen and examined at the bedside.    Remained critically ill on continuous ICU monitoring.    OBJECTIVE:  Vital Signs Last 24 Hrs  T(C): 36.7 (06 Nov 2022 04:00), Max: 36.7 (06 Nov 2022 04:00)  T(F): 98 (06 Nov 2022 04:00), Max: 98 (06 Nov 2022 04:00)  HR: 80 (06 Nov 2022 07:00) (66 - 86)  BP: 153/72 (06 Nov 2022 06:00) (120/60 - 153/73)  BP(mean): 103 (06 Nov 2022 06:00) (87 - 105)  RR: 29 (06 Nov 2022 07:00) (22 - 54)  SpO2: 99% (06 Nov 2022 06:11) (86% - 100%)    Parameters below as of 06 Nov 2022 06:11  Patient On (Oxygen Delivery Method): tracheostomy collar          Physical Exam:   General: Trach collar, NAD  Neurology: Nonfocal, responds to commands. Moves all extremities  ENT/Neck: + trach c/d/i, neck supple, trachea midline   Respiratory: coarse BS b/l, rhonchi throughout, minimal secretions present  CV: S1S2, no murmurs        [x] Sinus Rhythm   Abdominal: Soft, NT, ND, colostomy in place (stoma intact)  Extremities: nonpitting edema, improved. Right elbow wound vac c/d/i. warm and well-perfused.  Skin: Dry dressing over right heel. No cyanosis                               Assessment:  71F PMH DM, COPD, chronic adrenal insufficiency on Prednisone, history of colorectal cancer s/p resection (colostomy bag), Hx of CAD, chronic AF on Eliquis, and tracheomalacia s/p multiple intubations, recent dx of R foot OM s/p debridement on antibiotics and presented to ED at [facility] this morning with epigastric pain, belching and central chest pain. Found on CTA to have type A aortic dissection and transferred to [facility] for surgical evaluation by Dr. Cabrera.     Ascending aortic dissection s/p type A dissection repair and Biobentall on 9/7   Respiratory failure s/p Trach 10/10/22  Hypovolemia   Post op respiratory failure   Acute blood loss anemia  Stress hyperglycemia   Leukocytosis   RIJ thrombus  MRSA PNA        Plan:   ***Neuro***  [x] Nonfocal  follows commands, continue to monitor  PT/OT progress as tolerated, ambulated with physical therapy  OOBTC      ***Cardiovascular***  Limited TTE on 10/1: EF 69%, Hyperdynamic left ventricular systolic function. There is hypokinesis of the mid-base inferior wall. Nml RV fxn.   CT chest on 9/28: No CT evidence of pulmonary embolism. Status post repair of ascending aortic dissection with a stable dissection flap originating from the aortic arch and extending into the infrarenal abdominal aorta,  B/l UE duplex on 10/5: As before, there is an occluded RIGHT IJ vein with thrombosis extending to brachiocephalic vein. Superficial thrombophlebitis of the LEFT cephalic vein.   Invasive hemodynamic monitoring, assess perfusion indices   SR / MAP 84/ Hct 31.5/ Lactate 1.5  Continuos reassessment of hemodynamics   Hydralazine PO for BP management   Rate control with Lopressor and mexiletine   WVAC on rt elbow changed 11/4  [x] AC Therapy with Eliquis 5 BID for Afib and IJ thrombus  Serial EKG and cardiac enzymes     ***Pulmonary***  Trach collar 10L/40% since 10/25, consult with Dr. Cabrera about when to decannulate  Respiratory failure s/p Trach #8 portex  10/10/22, per ENT inner cannula needs to be changed daily, trach sutures d/c'ed by ENT 10/17 , 40% trach collar for 2 days  Titration of FiO2, follow SpO2, CXR, blood gases   Bronched 10/13  Continue duonebs  Suction q2hrly as needed   Aggressive Pulmonary toilet  Downsized to 7 cuffless trach 11/3                  ***GI***  [x] Diet: TFs Glucerna 1.2 @ goal 65ml/hr, tolerating without issues (when glucerna 1.5 back in stock will switch and decrease goal rate to 55ml/hr)  [x] Protonix    Reglan for gut motility       ***Renal***  Continue to monitor I/Os, BUN/Creatinine.   Replete lytes PRN      ***ID***  SCx on 10/4+Methicillin resistant Staphylococcus aureus, c/w IVPB Vancomycin, (plan for 6 week course in setting of valve surgery, 11/26 end date)  9/27 blood culture Neela Glabarata, on flucanazole (lifelong suppression)  BCx 10/13 NGTD, BC 10/21 and SCx NGTD  R elbow wound, S/p IR for elbow drainage 10/13 + Few Proteus mirabilis ESBL, Meropenem 7 day trial completed 10/19-> reordered 10/20 for reaccumulation of elbow bursitis-  10/22 plastics debrided R elbow eschar to subc tissue, ~8cc fluid aspirated and sent for culture. Wet to dry dressing changed 2x daily. PT changed vac yesterday  Joint Fluid Cx 10/22 - p. mirabilis  Meropenem for 8 day course (until 10/28),started in setting of elbow collection 10/22,Course completed.      ***Endocrine***  [x]  DM : HbA1c 9.4%                - [x] ISS  [x] NPH              - Need tight glycemic control to prevent wound infection.  Endocrine following, appreciate recommendations          Patient requires continuous monitoring with bedside rhythm monitoring, pulse oximetry monitoring, and continuous central venous and arterial pressure monitoring; and intermittent blood gas analysis. Care plan discussed with the ICU care team.   Patient remained critical, at risk for life threatening decompensation.    I have spent 30 minutes providing critical care management to this patient.    By signing my name below, I, Kieran Plascencia, attest that this documentation has been prepared under the direction and in the presence of KIANA Cuellar   Electronically signed: Georgi Cordero, 11-06-22 @ 07:47    I, KIANA Cuellar, personally performed the services described in this documentation. all medical record entries made by the marcyibronaldo were at my direction and in my presence. I have reviewed the chart and agree that the record reflects my personal performance and is accurate and complete  Electronically signed: KIANA Cuellar  Patient seen and examined at the bedside.    Remained critically ill on continuous ICU monitoring.    OBJECTIVE:  Vital Signs Last 24 Hrs  T(C): 36.7 (06 Nov 2022 04:00), Max: 36.7 (06 Nov 2022 04:00)  T(F): 98 (06 Nov 2022 04:00), Max: 98 (06 Nov 2022 04:00)  HR: 80 (06 Nov 2022 07:00) (66 - 86)  BP: 153/72 (06 Nov 2022 06:00) (120/60 - 153/73)  BP(mean): 103 (06 Nov 2022 06:00) (87 - 105)  RR: 29 (06 Nov 2022 07:00) (22 - 54)  SpO2: 99% (06 Nov 2022 06:11) (86% - 100%)    Parameters below as of 06 Nov 2022 06:11  Patient On (Oxygen Delivery Method): tracheostomy collar          Physical Exam:   General: Trach collar, NAD  Neurology: Nonfocal, responds to commands. Moves all extremities  ENT/Neck: + trach c/d/i, neck supple, trachea midline   Respiratory: coarse BS b/l, rhonchi throughout, minimal secretions present  CV: S1S2, no murmurs        [x] Sinus Rhythm   Abdominal: Soft, NT, ND, colostomy in place (stoma intact)  Extremities: nonpitting edema, improved. Right elbow wound vac c/d/i. warm and well-perfused.  Skin: Dry dressing over right heel. No cyanosis                               Assessment:  71F PMH DM, COPD, chronic adrenal insufficiency on Prednisone, history of colorectal cancer s/p resection (colostomy bag), Hx of CAD, chronic AF on Eliquis, and tracheomalacia s/p multiple intubations, recent dx of R foot OM s/p debridement on antibiotics and presented to ED at [facility] this morning with epigastric pain, belching and central chest pain. Found on CTA to have type A aortic dissection and transferred to [facility] for surgical evaluation by Dr. Cabrera.     Ascending aortic dissection s/p type A dissection repair and Biobentall on 9/7   Respiratory failure s/p Trach 10/10/22  Hypovolemia   Post op respiratory failure   Acute blood loss anemia  Stress hyperglycemia   Leukocytosis   RIJ thrombus  MRSA PNA        Plan:   ***Neuro***  [x] Nonfocal  follows commands, continue to monitor  PT/OT progress as tolerated, ambulated with physical therapy  OOBTC      ***Cardiovascular***  Limited TTE on 10/1: EF 69%, Hyperdynamic left ventricular systolic function. There is hypokinesis of the mid-base inferior wall. Nml RV fxn.   CT chest on 9/28: No CT evidence of pulmonary embolism. Status post repair of ascending aortic dissection with a stable dissection flap originating from the aortic arch and extending into the infrarenal abdominal aorta,  B/l UE duplex on 10/5: As before, there is an occluded RIGHT IJ vein with thrombosis extending to brachiocephalic vein. Superficial thrombophlebitis of the LEFT cephalic vein.   Invasive hemodynamic monitoring, assess perfusion indices   SR / MAP 84/ Hct 31.5/ Lactate 1.5  Continuos reassessment of hemodynamics   Hydralazine PO for BP management   Rate control with Lopressor and mexiletine   WVAC on rt elbow changed 11/4  [x] AC Therapy with Eliquis 5 BID for Afib and IJ thrombus  Serial EKG and cardiac enzymes     ***Pulmonary***  Trach collar 10L/40% since 10/25, consult with Dr. Cabrera about decannulation tomorrow   Respiratory failure s/p Trach #8 portex  10/10/22, per ENT inner cannula needs to be changed daily, trach sutures d/c'ed by ENT 10/17 , 40% trach collar for 2 days  Titration of FiO2, follow SpO2, CXR, blood gases   Bronched 10/13  Continue duonebs  Suction q2hrly as needed   Aggressive Pulmonary toilet  Downsized to 7 cuffless trach 11/3                  ***GI***  [x] Diet: TFs Glucerna 1.2 @ goal 65ml/hr, tolerating without issues (when glucerna 1.5 back in stock will switch and decrease goal rate to 55ml/hr)  [x] Protonix    Reglan for gut motility       ***Renal***  Continue to monitor I/Os, BUN/Creatinine.   Replete lytes PRN      ***ID***  SCx on 10/4+Methicillin resistant Staphylococcus aureus, c/w IVPB Vancomycin, (plan for 6 week course in setting of valve surgery, 11/26 end date)  9/27 blood culture Neela Glabarata, on flucanazole (lifelong suppression)  BCx 10/13 NGTD, BC 10/21 and SCx NGTD  R elbow wound, S/p IR for elbow drainage 10/13 + Few Proteus mirabilis ESBL, Meropenem 7 day trial completed 10/19-> reordered 10/20 for reaccumulation of elbow bursitis-  10/22 plastics debrided R elbow eschar to subc tissue, ~8cc fluid aspirated and sent for culture. Wet to dry dressing changed 2x daily. PT changed vac yesterday  Joint Fluid Cx 10/22 - p. mirabilis  Meropenem for 8 day course (until 10/28),started in setting of elbow collection 10/22,Course completed.      ***Endocrine***  [x]  DM : HbA1c 9.4%                - [x] ISS  [x] NPH              - Need tight glycemic control to prevent wound infection.  Endocrine following, appreciate recommendations          Patient requires continuous monitoring with bedside rhythm monitoring, pulse oximetry monitoring, and continuous central venous and arterial pressure monitoring; and intermittent blood gas analysis. Care plan discussed with the ICU care team.   Patient remained critical, at risk for life threatening decompensation.    I have spent 30 minutes providing critical care management to this patient.    By signing my name below, I, Kieran Plascencia, attest that this documentation has been prepared under the direction and in the presence of KIANA Cuellar   Electronically signed: Georgi Cordero, 11-06-22 @ 07:47    I, KIANA Cuellar, personally performed the services described in this documentation. all medical record entries made by the marcyibronaldo were at my direction and in my presence. I have reviewed the chart and agree that the record reflects my personal performance and is accurate and complete  Electronically signed: KIANA Cuellar  Patient seen and examined at the bedside.    Remained critically ill on continuous ICU monitoring.    OBJECTIVE:  Vital Signs Last 24 Hrs  T(C): 36.7 (06 Nov 2022 04:00), Max: 36.7 (06 Nov 2022 04:00)  T(F): 98 (06 Nov 2022 04:00), Max: 98 (06 Nov 2022 04:00)  HR: 80 (06 Nov 2022 07:00) (66 - 86)  BP: 153/72 (06 Nov 2022 06:00) (120/60 - 153/73)  BP(mean): 103 (06 Nov 2022 06:00) (87 - 105)  RR: 29 (06 Nov 2022 07:00) (22 - 54)  SpO2: 99% (06 Nov 2022 06:11) (86% - 100%)    Parameters below as of 06 Nov 2022 06:11  Patient On (Oxygen Delivery Method): tracheostomy collar          Physical Exam:   General: Trach collar, NAD  Neurology: Nonfocal, responds to commands. Moves all extremities  ENT/Neck: + trach c/d/i, neck supple, trachea midline   Respiratory: coarse BS b/l, rhonchi throughout, minimal secretions present  CV: S1S2, no murmurs        [x] Sinus Rhythm   Abdominal: Soft, NT, ND, colostomy in place (stoma intact)  Extremities: nonpitting edema, improved. Right elbow wound vac c/d/i. warm and well-perfused.  Skin: Dry dressing over right heel. No cyanosis                               Assessment:  71F PMH DM, COPD, chronic adrenal insufficiency on Prednisone, history of colorectal cancer s/p resection (colostomy bag), Hx of CAD, chronic AF on Eliquis, and tracheomalacia s/p multiple intubations, recent dx of R foot OM s/p debridement on antibiotics and presented to ED at [facility] this morning with epigastric pain, belching and central chest pain. Found on CTA to have type A aortic dissection and transferred to [facility] for surgical evaluation by Dr. Cabrera.     Ascending aortic dissection s/p type A dissection repair and Biobentall on 9/7   Respiratory failure s/p Trach 10/10/22  Hypovolemia   Post op respiratory failure   Acute blood loss anemia  Stress hyperglycemia   Leukocytosis   RIJ thrombus  MRSA PNA        Plan:   ***Neuro***  [x] Nonfocal  follows commands, continue to monitor  PT/OT progress as tolerated, ambulated with physical therapy  OOBTC      ***Cardiovascular***  Limited TTE on 10/1: EF 69%, Hyperdynamic left ventricular systolic function. There is hypokinesis of the mid-base inferior wall. Nml RV fxn.   CT chest on 9/28: No CT evidence of pulmonary embolism. Status post repair of ascending aortic dissection with a stable dissection flap originating from the aortic arch and extending into the infrarenal abdominal aorta,  B/l UE duplex on 10/5: As before, there is an occluded RIGHT IJ vein with thrombosis extending to brachiocephalic vein. Superficial thrombophlebitis of the LEFT cephalic vein.   Invasive hemodynamic monitoring, assess perfusion indices   SR / MAP 84/ Hct 31.5/ Lactate 1.5  Continuous reassessment of hemodynamics   Hydralazine PO for BP management   Rate control with Lopressor and mexiletine   WVAC on rt elbow changed 11/4  [x] AC Therapy with Eliquis 5 BID for Afib and IJ thrombus  Serial EKG and cardiac enzymes     ***Pulmonary***  Trach collar 10L/40% since 10/25, consult with Dr. Cabrera about decannulation tomorrow?  Respiratory failure s/p Trach #8 portex  10/10/22, per ENT inner cannula needs to be changed daily, trach sutures d/c'ed by ENT 10/17 , 40% trach collar for 2 days  Titration of FiO2, follow SpO2, CXR, blood gases   Bronched 10/13  Continue duonebs  Suction q2hrly as needed   Aggressive Pulmonary toilet  Downsized to 7 cuffless trach 11/3    ***GI***  [x] Diet: TFs Glucerna 1.2 @ goal 65ml/hr, tolerating without issues (when glucerna 1.5 back in stock will switch and decrease goal rate to 55ml/hr)  [x] Protonix    Reglan for gut motility     ***Renal***  Continue to monitor I/Os, BUN/Creatinine.   Replete lytes PRN    ***ID***  SCx on 10/4+Methicillin resistant Staphylococcus aureus, c/w IVPB Vancomycin, (plan for 6 week course in setting of valve surgery, 11/26 end date)  9/27 blood culture Neela Glabarata, on flucanazole (lifelong suppression)  BCx 10/13 NGTD, BC 10/21 and SCx NGTD  R elbow wound, S/p IR for elbow drainage 10/13 + Few Proteus mirabilis ESBL, Meropenem 7 day trial completed 10/19-> reordered 10/20 for reaccumulation of elbow bursitis-  10/22 plastics debrided R elbow eschar to subc tissue, ~8cc fluid aspirated and sent for culture. Wet to dry dressing changed 2x daily. PT changed vac yesterday  Joint Fluid Cx 10/22 - p. mirabilis  Meropenem for 8 day course (until 10/28),started in setting of elbow collection 10/22,Course completed.    ***Endocrine***  [x]  DM : HbA1c 9.4%                - [x] ISS  [x] NPH              - Need tight glycemic control to prevent wound infection.  Endocrine following, appreciate recommendations          Patient requires continuous monitoring with bedside rhythm monitoring, pulse oximetry monitoring, and continuous central venous and arterial pressure monitoring; and intermittent blood gas analysis. Care plan discussed with the ICU care team.   Patient remained critical, at risk for life threatening decompensation.    I have spent 30 minutes providing critical care management to this patient.    By signing my name below, I, Kieran Plascencia, attest that this documentation has been prepared under the direction and in the presence of KIANA Cuellar   Electronically signed: Georgi Cordero, 11-06-22 @ 07:47    I, KIANA Cuellar, personally performed the services described in this documentation. all medical record entries made by the marcyibronaldo were at my direction and in my presence. I have reviewed the chart and agree that the record reflects my personal performance and is accurate and complete  Electronically signed: KIANA Cuellar

## 2022-11-06 NOTE — CHART NOTE - NSCHARTNOTEFT_GEN_A_CORE
Age: 72y    Gender: Female    POCT Blood Glucose:  65 mg/dL (11-06-22 @ 17:14)  66 mg/dL (11-06-22 @ 17:12)  221 mg/dL (11-06-22 @ 12:28)  142 mg/dL (11-06-22 @ 06:24)  121 mg/dL (11-06-22 @ 01:11)      eMAR:  dextrose 50% Injectable   25 milliLiter(s) IV Push (11-06-22 @ 17:23)    insulin lispro (ADMELOG) corrective regimen sliding scale   0 Unit(s) SubCutaneous (11-06-22 @ 17:21)   4 Unit(s) SubCutaneous (11-06-22 @ 12:32)    insulin NPH human recombinant   46 Unit(s) SubCutaneous (11-06-22 @ 06:32)    insulin NPH human recombinant   10 Unit(s) SubCutaneous (11-06-22 @ 12:32)    insulin NPH human recombinant   8 Unit(s) SubCutaneous (11-06-22 @ 01:02)    methylPREDNISolone   8 milliGRAM(s) Oral (11-06-22 @ 06:31)      Type 2 diabetes mellitus with hyperglycemia.   ·  Plan: -test BG q6h if NPO/TF   -Hypoglycemic this evening , reduce noon NPH and continue to hold 1800 nph dose ; spoke RN Diaz pt pulled TF out so TF off for approx 2 hours todayand TF now replaced with TF to be restarted soon. Pt received D50% 1amp. at risk for rebound hyperglycemia however given severe hypo yesterday and today hypo after TF Held will continue with current regimen today.   12am: c/w NPH 8 units   6am: c/w NPH 46 units. Give 50% of dose if BG less than 120mg/dl. DO NOT HOLD IF RECEIVING TF  12pm: c/w NPH 10 units   6pm: Continue to hold this NPH dose for now.     ***-If TFs off after NPH is given please start D5 infusion at TF rate to prevent rebound hypoglycemia. ****  -C/w Admelog moderate correction scale q6h for now Age: 72y    Gender: Female    POCT Blood Glucose:  65 mg/dL (11-06-22 @ 17:14)  66 mg/dL (11-06-22 @ 17:12)  221 mg/dL (11-06-22 @ 12:28)  142 mg/dL (11-06-22 @ 06:24)  121 mg/dL (11-06-22 @ 01:11)      eMAR:  dextrose 50% Injectable   25 milliLiter(s) IV Push (11-06-22 @ 17:23)    insulin lispro (ADMELOG) corrective regimen sliding scale   0 Unit(s) SubCutaneous (11-06-22 @ 17:21)   4 Unit(s) SubCutaneous (11-06-22 @ 12:32)    insulin NPH human recombinant   46 Unit(s) SubCutaneous (11-06-22 @ 06:32)    insulin NPH human recombinant   10 Unit(s) SubCutaneous (11-06-22 @ 12:32)    insulin NPH human recombinant   8 Unit(s) SubCutaneous (11-06-22 @ 01:02)    methylPREDNISolone   8 milliGRAM(s) Oral (11-06-22 @ 06:31)      Type 2 diabetes mellitus with hyperglycemia.   ·  Plan: -test BG q6h if NPO/TF   -Hypoglycemic this evening  due to TF off x2 hrs. continue to hold 1800 nph dose ; spoke with - RADHA Perales pt pulled TF out so TF off for approx 2 hours today and TF now replaced with TF to be restarted soon. Pt received D50% 1amp. at risk for rebound hyperglycemia however given severe hypo yesterday and today hypo after TF Held will continue with current regimen today.   hold nph if TF OFF    12am: c/w NPH 8 units   6am: c/w NPH 46 units. Give 50% of dose if BG less than 120mg/dl. DO NOT HOLD IF RECEIVING TF  12pm: c/w NPH 10 units   6pm: Continue to hold this NPH dose for now.     ***-If TFs off after NPH is given please start D5 infusion at TF rate to prevent rebound hypoglycemia. ****  -C/w Admelog moderate correction scale q6h for now

## 2022-11-06 NOTE — PROGRESS NOTE ADULT - ASSESSMENT
71 year-old female with a history of DM2, CAD, A-Fib on apixaban, Severe Persistent Asthma (on chronic steroids, recently started on Tezspire), colon cancer s/p resection/chemo, and tracheobronchomalacia s/p tracheoplasty, and recent OM of the R foot s/b debridement and completed course of Vancomycin/Ertapenem for MRSA/ESBL Proteus/Corynebacterium who now presents with chest pain, found to have Type A dissection s/p repair with modified Bentall procedure and hemiarch replacement on 9/6/22. Post-op course complicated by acute hypoxemic respiratory failure, shock, anemia, and hyperglycemia requiring insulin gtt. Extubated 9/13, now awake and alert with mild encephalopathy but overall significantly improved.    Assessment:  Acute hypoxemic respiratory failure requiring intubation  Type A Aortic Dissection  Severe Persistent Asthma  History of Tracheobronchomalacia    Rec:  TC ATC, airway clearance (manual chest pt and chest vest 2-3x a day) and suctioning  Tolerating PMV and phonating  OOB to chair  c/w Bronchodilators, ICS, singulair and chronic steroids  ENT laryngoscopy and tracheoscopy on 11/2 unremarkable.   DVT ppx  Care per CTICU team     71 year-old female with a history of DM2, CAD, A-Fib on apixaban, Severe Persistent Asthma (on chronic steroids, recently started on Tezspire), colon cancer s/p resection/chemo, and tracheobronchomalacia s/p tracheoplasty, and recent OM of the R foot s/b debridement and completed course of Vancomycin/Ertapenem for MRSA/ESBL Proteus/Corynebacterium who now presents with chest pain, found to have Type A dissection s/p repair with modified Bentall procedure and hemiarch replacement on 9/6/22. Post-op course complicated by acute hypoxemic respiratory failure, shock, anemia, and hyperglycemia requiring insulin gtt. Extubated 9/13, now awake and alert with mild encephalopathy but overall significantly improved.    Assessment:  Acute hypoxemic respiratory failure requiring intubation  Type A Aortic Dissection  Severe Persistent Asthma  History of Tracheobronchomalacia    Rec:  TC ATC, airway clearance (manual chest pt and chest vest 2-3x a day) and suctioning  Tolerating PMV and phonating  ENT laryngoscopy and tracheoscopy on 11/2 unremarkable.   Recommend , with ABG 4 hours later and if stable, decannulate  OOB to chair  c/w Bronchodilators, ICS, singulair and chronic steroids  DVT ppx  Care per CTICU team  d/w patient at bedside

## 2022-11-07 LAB
ALBUMIN SERPL ELPH-MCNC: 2.1 G/DL — LOW (ref 3.3–5)
ALBUMIN SERPL ELPH-MCNC: 2.9 G/DL — LOW (ref 3.3–5)
ALP SERPL-CCNC: 115 U/L — SIGNIFICANT CHANGE UP (ref 40–120)
ALP SERPL-CCNC: 81 U/L — SIGNIFICANT CHANGE UP (ref 40–120)
ALT FLD-CCNC: 11 U/L — SIGNIFICANT CHANGE UP (ref 10–45)
ALT FLD-CCNC: 17 U/L — SIGNIFICANT CHANGE UP (ref 10–45)
ANION GAP SERPL CALC-SCNC: 7 MMOL/L — SIGNIFICANT CHANGE UP (ref 5–17)
ANION GAP SERPL CALC-SCNC: 9 MMOL/L — SIGNIFICANT CHANGE UP (ref 5–17)
AST SERPL-CCNC: 15 U/L — SIGNIFICANT CHANGE UP (ref 10–40)
AST SERPL-CCNC: 18 U/L — SIGNIFICANT CHANGE UP (ref 10–40)
BILIRUB SERPL-MCNC: 0.1 MG/DL — LOW (ref 0.2–1.2)
BILIRUB SERPL-MCNC: 0.2 MG/DL — SIGNIFICANT CHANGE UP (ref 0.2–1.2)
BUN SERPL-MCNC: 11 MG/DL — SIGNIFICANT CHANGE UP (ref 7–23)
BUN SERPL-MCNC: 14 MG/DL — SIGNIFICANT CHANGE UP (ref 7–23)
CALCIUM SERPL-MCNC: 6.1 MG/DL — CRITICAL LOW (ref 8.4–10.5)
CALCIUM SERPL-MCNC: 9.2 MG/DL — SIGNIFICANT CHANGE UP (ref 8.4–10.5)
CHLORIDE SERPL-SCNC: 102 MMOL/L — SIGNIFICANT CHANGE UP (ref 96–108)
CHLORIDE SERPL-SCNC: 115 MMOL/L — HIGH (ref 96–108)
CO2 SERPL-SCNC: 20 MMOL/L — LOW (ref 22–31)
CO2 SERPL-SCNC: 29 MMOL/L — SIGNIFICANT CHANGE UP (ref 22–31)
CREAT SERPL-MCNC: 0.4 MG/DL — LOW (ref 0.5–1.3)
CREAT SERPL-MCNC: <0.3 MG/DL — LOW (ref 0.5–1.3)
EGFR: 104 ML/MIN/1.73M2 — SIGNIFICANT CHANGE UP
EGFR: 111 ML/MIN/1.73M2 — SIGNIFICANT CHANGE UP
GLUCOSE BLDC GLUCOMTR-MCNC: 114 MG/DL — HIGH (ref 70–99)
GLUCOSE BLDC GLUCOMTR-MCNC: 143 MG/DL — HIGH (ref 70–99)
GLUCOSE BLDC GLUCOMTR-MCNC: 224 MG/DL — HIGH (ref 70–99)
GLUCOSE BLDC GLUCOMTR-MCNC: 89 MG/DL — SIGNIFICANT CHANGE UP (ref 70–99)
GLUCOSE SERPL-MCNC: 105 MG/DL — HIGH (ref 70–99)
GLUCOSE SERPL-MCNC: 71 MG/DL — SIGNIFICANT CHANGE UP (ref 70–99)
HCT VFR BLD CALC: 24.1 % — LOW (ref 34.5–45)
HCT VFR BLD CALC: 30.3 % — LOW (ref 34.5–45)
HGB BLD-MCNC: 7.4 G/DL — LOW (ref 11.5–15.5)
HGB BLD-MCNC: 9.3 G/DL — LOW (ref 11.5–15.5)
MAGNESIUM SERPL-MCNC: 1.2 MG/DL — LOW (ref 1.6–2.6)
MAGNESIUM SERPL-MCNC: 1.8 MG/DL — SIGNIFICANT CHANGE UP (ref 1.6–2.6)
MCHC RBC-ENTMCNC: 23.5 PG — LOW (ref 27–34)
MCHC RBC-ENTMCNC: 23.6 PG — LOW (ref 27–34)
MCHC RBC-ENTMCNC: 30.7 GM/DL — LOW (ref 32–36)
MCHC RBC-ENTMCNC: 30.7 GM/DL — LOW (ref 32–36)
MCV RBC AUTO: 76.5 FL — LOW (ref 80–100)
MCV RBC AUTO: 76.9 FL — LOW (ref 80–100)
NRBC # BLD: 0 /100 WBCS — SIGNIFICANT CHANGE UP (ref 0–0)
NRBC # BLD: 0 /100 WBCS — SIGNIFICANT CHANGE UP (ref 0–0)
PHOSPHATE SERPL-MCNC: 2.3 MG/DL — LOW (ref 2.5–4.5)
PHOSPHATE SERPL-MCNC: 3.5 MG/DL — SIGNIFICANT CHANGE UP (ref 2.5–4.5)
PLATELET # BLD AUTO: 185 K/UL — SIGNIFICANT CHANGE UP (ref 150–400)
PLATELET # BLD AUTO: 267 K/UL — SIGNIFICANT CHANGE UP (ref 150–400)
POTASSIUM SERPL-MCNC: 2.9 MMOL/L — CRITICAL LOW (ref 3.5–5.3)
POTASSIUM SERPL-MCNC: 3.9 MMOL/L — SIGNIFICANT CHANGE UP (ref 3.5–5.3)
POTASSIUM SERPL-SCNC: 2.9 MMOL/L — CRITICAL LOW (ref 3.5–5.3)
POTASSIUM SERPL-SCNC: 3.9 MMOL/L — SIGNIFICANT CHANGE UP (ref 3.5–5.3)
PROT SERPL-MCNC: 4.5 G/DL — LOW (ref 6–8.3)
PROT SERPL-MCNC: 6.4 G/DL — SIGNIFICANT CHANGE UP (ref 6–8.3)
RBC # BLD: 3.15 M/UL — LOW (ref 3.8–5.2)
RBC # BLD: 3.94 M/UL — SIGNIFICANT CHANGE UP (ref 3.8–5.2)
RBC # FLD: 21.2 % — HIGH (ref 10.3–14.5)
RBC # FLD: 21.4 % — HIGH (ref 10.3–14.5)
SODIUM SERPL-SCNC: 138 MMOL/L — SIGNIFICANT CHANGE UP (ref 135–145)
SODIUM SERPL-SCNC: 144 MMOL/L — SIGNIFICANT CHANGE UP (ref 135–145)
VANCOMYCIN TROUGH SERPL-MCNC: 10.7 UG/ML — SIGNIFICANT CHANGE UP (ref 10–20)
WBC # BLD: 11 K/UL — HIGH (ref 3.8–10.5)
WBC # BLD: 11.9 K/UL — HIGH (ref 3.8–10.5)
WBC # FLD AUTO: 11 K/UL — HIGH (ref 3.8–10.5)
WBC # FLD AUTO: 11.9 K/UL — HIGH (ref 3.8–10.5)

## 2022-11-07 PROCEDURE — 99232 SBSQ HOSP IP/OBS MODERATE 35: CPT

## 2022-11-07 PROCEDURE — 99291 CRITICAL CARE FIRST HOUR: CPT | Mod: 24

## 2022-11-07 PROCEDURE — 71045 X-RAY EXAM CHEST 1 VIEW: CPT | Mod: 26

## 2022-11-07 RX ORDER — HUMAN INSULIN 100 [IU]/ML
5 INJECTION, SUSPENSION SUBCUTANEOUS
Refills: 0 | Status: ACTIVE | OUTPATIENT
Start: 2022-11-07 | End: 2023-10-06

## 2022-11-07 RX ORDER — HUMAN INSULIN 100 [IU]/ML
48 INJECTION, SUSPENSION SUBCUTANEOUS
Refills: 0 | Status: DISCONTINUED | OUTPATIENT
Start: 2022-11-07 | End: 2022-11-08

## 2022-11-07 RX ORDER — MAGNESIUM SULFATE 500 MG/ML
2 VIAL (ML) INJECTION ONCE
Refills: 0 | Status: COMPLETED | OUTPATIENT
Start: 2022-11-07 | End: 2022-11-07

## 2022-11-07 RX ORDER — DIPHENHYDRAMINE HCL 50 MG
12.5 CAPSULE ORAL ONCE
Refills: 0 | Status: COMPLETED | OUTPATIENT
Start: 2022-11-07 | End: 2022-11-07

## 2022-11-07 RX ORDER — POTASSIUM CHLORIDE 20 MEQ
20 PACKET (EA) ORAL ONCE
Refills: 0 | Status: COMPLETED | OUTPATIENT
Start: 2022-11-07 | End: 2022-11-07

## 2022-11-07 RX ADMIN — Medication 50 MILLIGRAM(S): at 21:51

## 2022-11-07 RX ADMIN — HUMAN INSULIN 5 UNIT(S): 100 INJECTION, SUSPENSION SUBCUTANEOUS at 11:45

## 2022-11-07 RX ADMIN — MEXILETINE HYDROCHLORIDE 200 MILLIGRAM(S): 150 CAPSULE ORAL at 05:57

## 2022-11-07 RX ADMIN — MAGNESIUM OXIDE 400 MG ORAL TABLET 400 MILLIGRAM(S): 241.3 TABLET ORAL at 21:51

## 2022-11-07 RX ADMIN — Medication 3 MILLILITER(S): at 17:09

## 2022-11-07 RX ADMIN — FLUCONAZOLE 150 MILLIGRAM(S): 150 TABLET ORAL at 23:20

## 2022-11-07 RX ADMIN — Medication 0.5 MILLIGRAM(S): at 17:10

## 2022-11-07 RX ADMIN — Medication 1 APPLICATION(S): at 07:06

## 2022-11-07 RX ADMIN — Medication 0.5 MILLIGRAM(S): at 06:32

## 2022-11-07 RX ADMIN — APIXABAN 5 MILLIGRAM(S): 2.5 TABLET, FILM COATED ORAL at 18:05

## 2022-11-07 RX ADMIN — Medication 8 MILLIGRAM(S): at 06:46

## 2022-11-07 RX ADMIN — Medication 4: at 11:46

## 2022-11-07 RX ADMIN — Medication 25 GRAM(S): at 06:55

## 2022-11-07 RX ADMIN — NYSTATIN CREAM 1 APPLICATION(S): 100000 CREAM TOPICAL at 06:42

## 2022-11-07 RX ADMIN — Medication 12.5 MILLIGRAM(S): at 17:11

## 2022-11-07 RX ADMIN — HUMAN INSULIN 8 UNIT(S): 100 INJECTION, SUSPENSION SUBCUTANEOUS at 01:00

## 2022-11-07 RX ADMIN — Medication 3 MILLILITER(S): at 06:32

## 2022-11-07 RX ADMIN — DORZOLAMIDE HYDROCHLORIDE 1 DROP(S): 20 SOLUTION/ DROPS OPHTHALMIC at 14:29

## 2022-11-07 RX ADMIN — Medication 4 MILLILITER(S): at 17:10

## 2022-11-07 RX ADMIN — Medication 1 APPLICATION(S): at 14:35

## 2022-11-07 RX ADMIN — MEXILETINE HYDROCHLORIDE 200 MILLIGRAM(S): 150 CAPSULE ORAL at 14:24

## 2022-11-07 RX ADMIN — Medication 650 MILLIGRAM(S): at 10:42

## 2022-11-07 RX ADMIN — Medication 20 MILLIEQUIVALENT(S): at 07:42

## 2022-11-07 RX ADMIN — PANTOPRAZOLE SODIUM 40 MILLIGRAM(S): 20 TABLET, DELAYED RELEASE ORAL at 11:45

## 2022-11-07 RX ADMIN — Medication 3 MILLILITER(S): at 11:02

## 2022-11-07 RX ADMIN — Medication 5 MILLIGRAM(S): at 14:24

## 2022-11-07 RX ADMIN — Medication 250 MILLIGRAM(S): at 10:29

## 2022-11-07 RX ADMIN — DORZOLAMIDE HYDROCHLORIDE 1 DROP(S): 20 SOLUTION/ DROPS OPHTHALMIC at 21:55

## 2022-11-07 RX ADMIN — MAGNESIUM OXIDE 400 MG ORAL TABLET 400 MILLIGRAM(S): 241.3 TABLET ORAL at 14:25

## 2022-11-07 RX ADMIN — Medication 12.5 MILLIGRAM(S): at 15:51

## 2022-11-07 RX ADMIN — HUMAN INSULIN 46 UNIT(S): 100 INJECTION, SUSPENSION SUBCUTANEOUS at 06:54

## 2022-11-07 RX ADMIN — Medication 500 MILLIGRAM(S): at 11:47

## 2022-11-07 RX ADMIN — Medication 4 MILLILITER(S): at 06:32

## 2022-11-07 RX ADMIN — NYSTATIN CREAM 1 APPLICATION(S): 100000 CREAM TOPICAL at 17:12

## 2022-11-07 RX ADMIN — Medication 1 TABLET(S): at 11:47

## 2022-11-07 RX ADMIN — MAGNESIUM OXIDE 400 MG ORAL TABLET 400 MILLIGRAM(S): 241.3 TABLET ORAL at 06:47

## 2022-11-07 RX ADMIN — Medication 12.5 MILLIGRAM(S): at 05:57

## 2022-11-07 RX ADMIN — Medication 50 MILLIGRAM(S): at 14:25

## 2022-11-07 RX ADMIN — Medication 5 MILLIGRAM(S): at 21:50

## 2022-11-07 RX ADMIN — Medication 250 MILLIGRAM(S): at 22:02

## 2022-11-07 RX ADMIN — Medication 1 DROP(S): at 06:48

## 2022-11-07 RX ADMIN — Medication 50 MILLIGRAM(S): at 05:57

## 2022-11-07 RX ADMIN — Medication 1 APPLICATION(S): at 22:03

## 2022-11-07 RX ADMIN — Medication 3 MILLILITER(S): at 23:08

## 2022-11-07 RX ADMIN — MEXILETINE HYDROCHLORIDE 200 MILLIGRAM(S): 150 CAPSULE ORAL at 21:50

## 2022-11-07 RX ADMIN — CHLORHEXIDINE GLUCONATE 1 APPLICATION(S): 213 SOLUTION TOPICAL at 06:44

## 2022-11-07 RX ADMIN — Medication 1 APPLICATION(S): at 12:55

## 2022-11-07 RX ADMIN — Medication 1 DROP(S): at 17:11

## 2022-11-07 RX ADMIN — Medication 5 MILLIGRAM(S): at 05:58

## 2022-11-07 RX ADMIN — Medication 650 MILLIGRAM(S): at 12:00

## 2022-11-07 RX ADMIN — DORZOLAMIDE HYDROCHLORIDE 1 DROP(S): 20 SOLUTION/ DROPS OPHTHALMIC at 06:49

## 2022-11-07 RX ADMIN — APIXABAN 5 MILLIGRAM(S): 2.5 TABLET, FILM COATED ORAL at 05:57

## 2022-11-07 NOTE — PROGRESS NOTE ADULT - ASSESSMENT
72 F w/h/o uncontrolled T2DM (A1C 9.4%) on basal/bolus insulin PTA. Unknown DM complications. Also COPD, secondary adrenal Insufficiency on chronic steroids, colorectal cancer s/p resection (colostomy bag), Chronic A fib on Eliquis, and tracheomalacia and multiple intubations, recent dx of OM presented to ED at [facility] with epigastric pain, belching and central chest pain. Found on CTA to have type A aortic dissection and transferred to [facility] for surgical evaluation by Dr. Cabrera. Now s/p aortic dissection repair on 9/07/22. Endocrine consulted for assistance with uncontrolled DM/steroids for AI. Pt in ICU with ventricular dysfunction/sepsis, and now s/p trach 10/10 and more recently s/p R elbow eschar debridement 10/22 > Wound VAC 10/24.  On Prednisone dose 8mg for AI. Tolerating TF @ goal rate w/ on and off hypo/hyperglycemia. Hypoglycemic yesterday at 6pm after getting NPH 10 units at 12noon > decreased dose today. Also hyperglycemic at 12noon. Will increase am NPH dose when pt takes steroid dose. Will adjust regimen based on 24 hour pattern. BG goal (100-180mg/dl).

## 2022-11-07 NOTE — PROGRESS NOTE ADULT - SUBJECTIVE AND OBJECTIVE BOX
CHIEF COMPLAINT: f/up sob, chronic resp failure, TBM, severe persistent asthma, VC dysfunction, Type A aortic dissection s/p repair w/modified "Bentall procedure and hemiarch replacement 9/6-trached--on TC-feels well, able to phonate    Interval Events: more OOB, no new events    REVIEW OF SYSTEMS:  Constitutional: No fevers or chills. No weight loss. No fatigue or generalized malaise.  Eyes: No itching or discharge from the eyes  ENT: No ear pain. No ear discharge. No nasal congestion. No post nasal drip. No epistaxis. No throat pain. No sore throat. No difficulty swallowing.   CV: No chest pain. No palpitations. No lightheadedness or dizziness.   Resp: No dyspnea at rest. No dyspnea on exertion. No orthopnea. No wheezing. No cough. No stridor. No sputum production. No chest pain with respiration.  GI: No nausea. No vomiting. No diarrhea.  MSK: No joint pain or pain in any extremities  Integumentary: No skin lesions. No pedal edema.  Neurological: + gross motor weakness. No sensory changes.  [+ ] All other systems negative  [ ] Unable to assess ROS because ________    OBJECTIVE:  ICU Vital Signs Last 24 Hrs  T(C): 36.8 (06 Nov 2022 20:00), Max: 36.8 (06 Nov 2022 20:00)  T(F): 98.3 (06 Nov 2022 20:00), Max: 98.3 (06 Nov 2022 20:00)  HR: 64 (07 Nov 2022 03:47) (64 - 83)  BP: 130/54 (07 Nov 2022 03:00) (115/58 - 153/72)  BP(mean): 78 (07 Nov 2022 03:00) (77 - 103)  ABP: --  ABP(mean): --  RR: 26 (07 Nov 2022 03:47) (22 - 34)  SpO2: 99% (07 Nov 2022 03:47) (86% - 100%)    O2 Parameters below as of 07 Nov 2022 03:47  Patient On (Oxygen Delivery Method): tracheostomy collar  O2 Flow (L/min): 12  O2 Concentration (%): 50          11-05 @ 08:01  -  11-06 @ 07:00  --------------------------------------------------------  IN: 2585 mL / OUT: 975 mL / NET: 1610 mL    11-06 @ 07:01  -  11-07 @ 05:24  --------------------------------------------------------  IN: 1955 mL / OUT: 1500 mL / NET: 455 mL      CAPILLARY BLOOD GLUCOSE      POCT Blood Glucose.: 114 mg/dL (07 Nov 2022 00:54)      PHYSICAL EXAM: NAD on TC  General: Awake, alert, oriented X 3.   HEENT: Atraumatic, normocephalic.                 Mallampatti Grade 3                No nasal congestion.                No tonsillar or pharyngeal exudates.  Lymph Nodes: No palpable lymphadenopathy  Neck: No JVD. No carotid bruit.   Respiratory: abnormal chest expansion                         Normal percussion                         Normal and equal air entry                         No wheeze, rhonchi or rales.  Cardiovascular: S1 S2 normal. No murmurs, rubs or gallops.   Abdomen: Soft, non-tender, non-distended. No organomegaly. Normoactive bowel sounds.  Extremities: Warm to touch. Peripheral pulse palpable. No pedal edema.   Skin: No rashes or skin lesions  Neurological: Motor and sensory examination equal and normal in all four extremities.  Psychiatry: Appropriate mood and affect.    HOSPITAL MEDICATIONS:  MEDICATIONS  (STANDING):  acetylcysteine 10%  Inhalation 4 milliLiter(s) Inhalation every 12 hours  albuterol/ipratropium for Nebulization 3 milliLiter(s) Nebulizer every 6 hours  apixaban 5 milliGRAM(s) Oral every 12 hours  ascorbic acid 500 milliGRAM(s) Oral daily  buDESOnide    Inhalation Suspension 0.5 milliGRAM(s) Inhalation every 12 hours  chlorhexidine 2% Cloths 1 Application(s) Topical <User Schedule>  collagenase Ointment 1 Application(s) Topical daily  diphenhydramine 2%/zinc acetate 0.1% Cream 1 Application(s) Topical every 8 hours  dorzolamide 2% Ophthalmic Solution 1 Drop(s) Right EYE three times a day  fluconAZOLE IVPB 600 milliGRAM(s) IV Intermittent every 24 hours  hydrALAZINE 50 milliGRAM(s) Oral every 8 hours  insulin lispro (ADMELOG) corrective regimen sliding scale   SubCutaneous every 6 hours  insulin NPH human recombinant 46 Unit(s) SubCutaneous <User Schedule>  insulin NPH human recombinant 10 Unit(s) SubCutaneous <User Schedule>  insulin NPH human recombinant 8 Unit(s) SubCutaneous <User Schedule>  magnesium oxide 400 milliGRAM(s) Oral every 8 hours  methylPREDNISolone 8 milliGRAM(s) Oral daily  metoclopramide Injectable 5 milliGRAM(s) IV Push every 8 hours  metoprolol tartrate 12.5 milliGRAM(s) Oral every 12 hours  mexiletine 200 milliGRAM(s) Oral every 8 hours  multivitamin 1 Tablet(s) Oral daily  nystatin Powder 1 Application(s) Topical two times a day  pantoprazole  Injectable 40 milliGRAM(s) IV Push daily  sodium chloride 0.9%. 1000 milliLiter(s) (10 mL/Hr) IV Continuous <Continuous>  timolol 0.5% Solution 1 Drop(s) Right EYE two times a day  vancomycin  IVPB 750 milliGRAM(s) IV Intermittent every 12 hours  vancomycin  IVPB        MEDICATIONS  (PRN):  acetaminophen    Suspension .. 650 milliGRAM(s) Oral every 6 hours PRN Temp greater or equal to 38C (100.4F), Mild Pain (1 - 3)      LABS:                        9.3    11.90 )-----------( 267      ( 07 Nov 2022 03:00 )             30.3     11-07    138  |  102  |  14  ----------------------------<  105<H>  3.9   |  29  |  0.40<L>    Ca    9.2      07 Nov 2022 03:00  Phos  3.5     11-07  Mg     1.8     11-07    TPro  6.4  /  Alb  2.9<L>  /  TBili  0.2  /  DBili  x   /  AST  18  /  ALT  17  /  AlkPhos  115  11-07              MICROBIOLOGY:     RADIOLOGY:  [ ] Reviewed and interpreted by me    Point of Care Ultrasound Findings:    PFT:    EKG:

## 2022-11-07 NOTE — PROGRESS NOTE ADULT - TIME BILLING
as above: resp stable---ambulating daily  -ID f/up-resp stable--wound care f/up-TC continues- (she will always have secretions) due to TBM-s/p  trach 10/10-s/p  resp failure-s/p yicjpd-ESGK-al ABX-stable CV status-re- VEST rx in use  multifactorial dyspnea-resp failure-severe persistent asthma, TBM s/p tracheoplasty, s/p Aortic aneurysm repair, Bronchitis (proteus)-O2-keep 90%;TC  severe persistent asthma--medrol 8mg, singulair 10, duoneb q 6, budes .5 bid, tezspire 8/29-was due 9/29  TBM-s/p tracheoplasty--accapella, vest rx, mucomyst here 2 cc 10% q 8 (secretions)  s/p Aortic aneurysm repair--as per CTS staff care  AF-on eliquis rx               CV-improved hydralazine/lopressor/mexilitine   DVT R-IJ-on heparin rx  *****ID-s/p proteus bronchitis-s/p meropenum changed to ceftriaxone and back to meropenem/vanco- off of ABX as of 9/19--restart of ABX 9/27-meropenem/vanco-NOW on meropenem and vanco for MRSE sepsis (11/15 end date for vanco), plastics/ortho for elbow-proteus (?wash out)-vac in place-suppressive rx-bact/fluconazole  PT-OOB as able                             GI-TF as able--f/up LFTs-normal       ****ORTHO f/up--? additional wash out of elbow wound?; wound care f/up  **VC dysfunction--aspiration precautions-s/p trach 10/10-ENT f/up s/p laryngoscopy-? decannulation    Heme onc f/up colon ca  prog--critical in CTU                PT-to continue-more OOB     DC planning    Eulalio Allison MD-Pulmonary   [phone]

## 2022-11-07 NOTE — PROGRESS NOTE ADULT - SUBJECTIVE AND OBJECTIVE BOX
Patient seen and examined at the bedside.    Remained critically ill on continuous ICU monitoring.    OBJECTIVE:  Vital Signs Last 24 Hrs  T(C): 36.8 (06 Nov 2022 20:00), Max: 36.8 (06 Nov 2022 20:00)  T(F): 98.3 (06 Nov 2022 20:00), Max: 98.3 (06 Nov 2022 20:00)  HR: 80 (07 Nov 2022 06:51) (64 - 83)  BP: 156/63 (07 Nov 2022 05:00) (115/58 - 156/63)  BP(mean): 91 (07 Nov 2022 05:00) (77 - 103)  RR: 26 (07 Nov 2022 05:00) (22 - 34)  SpO2: 99% (07 Nov 2022 06:51) (99% - 100%)    Parameters below as of 07 Nov 2022 06:51  Patient On (Oxygen Delivery Method): tracheostomy collar      Physical Exam:   General: Trach collar, NAD  Neurology: Nonfocal, responds to commands. Moves all extremities  ENT/Neck: + trach c/d/i, neck supple, trachea midline   Respiratory: coarse BS b/l, rhonchi throughout, minimal secretions present  CV: S1S2, no murmurs        [x] Sinus Rhythm   Abdominal: Soft, NT, ND, colostomy in place (stoma intact)  Extremities: nonpitting edema, improved. Right elbow wound vac c/d/i. warm and well-perfused.  Skin: Dry dressing over right heel. No cyanosis          Assessment:  71F PMH DM, COPD, chronic adrenal insufficiency on Prednisone, history of colorectal cancer s/p resection (colostomy bag), Hx of CAD, chronic AF on Eliquis, and tracheomalacia s/p multiple intubations, recent dx of R foot OM s/p debridement on antibiotics and presented to ED at [facility] this morning with epigastric pain, belching and central chest pain. Found on CTA to have type A aortic dissection and transferred to [facility] for surgical evaluation by Dr. Cabrera.     Ascending aortic dissection s/p type A dissection repair and Biobentall on 9/7   Respiratory failure s/p Trach 10/10/22  Hypovolemia   Post op respiratory failure   Acute blood loss anemia  Stress hyperglycemia   Leukocytosis   RIJ thrombus  MRSA PNA    Plan:   ***Neuro***  [x] Nonfocal  follows commands, continue to monitor  PT/OT progress as tolerated, ambulated with physical therapy  OOBTC    ***Cardiovascular***  Limited TTE on 10/1: EF 69%, Hyperdynamic left ventricular systolic function. There is hypokinesis of the mid-base inferior wall. Nml RV fxn.   CT chest on 9/28: No CT evidence of pulmonary embolism. Status post repair of ascending aortic dissection with a stable dissection flap originating from the aortic arch and extending into the infrarenal abdominal aorta,  B/l UE duplex on 10/5: As before, there is an occluded RIGHT IJ vein with thrombosis extending to brachiocephalic vein. Superficial thrombophlebitis of the LEFT cephalic vein.   Invasive hemodynamic monitoring, assess perfusion indices   SR / MAP 84/ Hct 30.3%/ Lactate 1.5  Continuous reassessment of hemodynamics   Hydralazine PO for BP management   Rate control with Lopressor and mexiletine   WVAC on rt elbow changed 11/4  [x] AC Therapy with Eliquis 5 BID for Afib and IJ thrombus  Serial EKG and cardiac enzymes     ***Pulmonary***  Trach collar 10L/40% since 10/25, consult with Dr. Cabrera about decannulation tomorrow?  Respiratory failure s/p Trach #8 portex  10/10/22, per ENT inner cannula needs to be changed daily, trach sutures d/c'ed by ENT 10/17 , 40% trach collar for 2 days  Titration of FiO2, follow SpO2, CXR, blood gases   Bronched 10/13  Continue duonebs  Suction q2hrly as needed   Aggressive Pulmonary toilet  Downsized to 7 cuffless trach 11/3            ***GI***  [x] Diet: TFs Glucerna 1.2 @ goal 65ml/hr, tolerating without issues (when glucerna 1.5 back in stock will switch and decrease goal rate to 55ml/hr)  [x] Protonix    Reglan for gut motility     ***Renal***  Continue to monitor I/Os, BUN/Creatinine.   Replete lytes PRN    ***ID***  SCx on 10/4+Methicillin resistant Staphylococcus aureus, c/w IVPB Vancomycin, (plan for 6 week course in setting of valve surgery, 11/26 end date)  9/27 blood culture Neela Glabarata, on flucanazole (lifelong suppression)  BCx 10/13 NGTD, BC 10/21 and SCx NGTD  R elbow wound, S/p IR for elbow drainage 10/13 + Few Proteus mirabilis ESBL, Meropenem 7 day trial completed 10/19-> reordered 10/20 for reaccumulation of elbow bursitis-  10/22 plastics debrided R elbow eschar to subc tissue, ~8cc fluid aspirated and sent for culture. Wet to dry dressing changed 2x daily. PT changed vac yesterday  Joint Fluid Cx 10/22 - p. mirabilis  Meropenem for 8 day course (until 10/28),started in setting of elbow collection 10/22,Course completed.    ***Endocrine***  [x]  DM : HbA1c 9.4%                - [x] ISS  [x] NPH              - Need tight glycemic control to prevent wound infection.  Endocrine following, appreciate recommendations        Patient requires continuous monitoring with bedside rhythm monitoring, pulse oximetry monitoring, and continuous central venous and arterial pressure monitoring; and intermittent blood gas analysis. Care plan discussed with the ICU care team.   Patient remained critical, at risk for life threatening decompensation.    I have spent 30 minutes providing critical care management to this patient.    By signing my name below, I, Bharti Barrios, attest that this documentation has been prepared under the direction and in the presence of KIANA Issa.  Electronically signed: Georgi Gottlieb, 11-07-22 @ 07:42    I, Mackenzie Riggins, personally performed the services described in this documentation. all medical record entries made by the scribe were at my direction and in my presence. I have reviewed the chart and agree that the record reflects my personal performance and is accurate and complete  Electronically signed: KIANA Issa. Patient seen and examined at the bedside.    Remained critically ill on continuous ICU monitoring.    OBJECTIVE:  Vital Signs Last 24 Hrs  T(C): 36.8 (06 Nov 2022 20:00), Max: 36.8 (06 Nov 2022 20:00)  T(F): 98.3 (06 Nov 2022 20:00), Max: 98.3 (06 Nov 2022 20:00)  HR: 80 (07 Nov 2022 06:51) (64 - 83)  BP: 156/63 (07 Nov 2022 05:00) (115/58 - 156/63)  BP(mean): 91 (07 Nov 2022 05:00) (77 - 103)  RR: 26 (07 Nov 2022 05:00) (22 - 34)  SpO2: 99% (07 Nov 2022 06:51) (99% - 100%)    Parameters below as of 07 Nov 2022 06:51  Patient On (Oxygen Delivery Method): tracheostomy collar      Physical Exam:   General: Trach collar, NAD  Neurology: Nonfocal, responds to commands. Moves all extremities  ENT/Neck: + trach c/d/i, neck supple, trachea midline   Respiratory: coarse BS b/l, rhonchi throughout, minimal secretions present  CV: S1S2, no murmurs        [x] Sinus Rhythm   Abdominal: Soft, NT, ND, colostomy in place (stoma intact)  Extremities: nonpitting edema, improved. Right elbow wound vac c/d/i. warm and well-perfused.  Skin: Dry dressing over right heel. No cyanosis          Assessment:  71F PMH DM, COPD, chronic adrenal insufficiency on Prednisone, history of colorectal cancer s/p resection (colostomy bag), Hx of CAD, chronic AF on Eliquis, and tracheomalacia s/p multiple intubations, recent dx of R foot OM s/p debridement on antibiotics and presented to ED at [facility] this morning with epigastric pain, belching and central chest pain. Found on CTA to have type A aortic dissection and transferred to [facility] for surgical evaluation by Dr. Cabrera.     Ascending aortic dissection s/p type A dissection repair and Biobentall on 9/7   Respiratory failure s/p Trach 10/10/22  Hypovolemia   Post op respiratory failure   Acute blood loss anemia  Stress hyperglycemia   Leukocytosis   RIJ thrombus  MRSA PNA    Plan:   ***Neuro***  [x] Nonfocal  follows commands, continue to monitor  PT/OT progress as tolerated, ambulated with physical therapy  OOBTC    ***Cardiovascular***  Limited TTE on 10/1: EF 69%, Hyperdynamic left ventricular systolic function. There is hypokinesis of the mid-base inferior wall. Nml RV fxn.   CT chest on 9/28: No CT evidence of pulmonary embolism. Status post repair of ascending aortic dissection with a stable dissection flap originating from the aortic arch and extending into the infrarenal abdominal aorta,  B/l UE duplex on 10/5: As before, there is an occluded RIGHT IJ vein with thrombosis extending to brachiocephalic vein. Superficial thrombophlebitis of the LEFT cephalic vein.   Invasive hemodynamic monitoring, assess perfusion indices   SR / MAP 84/ Hct 30.3%/ Lactate 1.5  Continuous reassessment of hemodynamics   Hydralazine PO for BP management   Rate control with Lopressor and mexiletine   WVAC on rt elbow changed 11/4  [x] AC Therapy with Eliquis 5 BID for Afib and IJ thrombus  Serial EKG and cardiac enzymes     ***Pulmonary***  Trach collar 10L/40% since 10/25, consult with Dr. Cabrera about decannulation tomorrow?  Respiratory failure s/p Trach #8 portex  10/10/22, per ENT inner cannula needs to be changed daily, trach sutures d/c'ed by ENT 10/17 , 40% trach collar for 2 days  Titration of FiO2, follow SpO2, CXR, blood gases   Bronched 10/13  Continue duonebs  Suction q2hrly as needed   Aggressive Pulmonary toilet  Downsized to 7 cuffless trach 11/3            ***GI***  [x] Diet: TFs Glucerna 1.2 @ goal 65ml/hr, tolerating without issues (when glucerna 1.5 back in stock will switch and decrease goal rate to 55ml/hr)  [x] Protonix    Reglan for gut motility     ***Renal***  Continue to monitor I/Os, BUN/Creatinine.   Replete lytes PRN    ***ID***  SCx on 10/4+Methicillin resistant Staphylococcus aureus, c/w IVPB Vancomycin, (plan for 6 week course in setting of valve surgery, 11/26 end date)  9/27 blood culture Neela Glabarata, on flucanazole (lifelong suppression)  BCx 10/13 NGTD, BC 10/21 and SCx NGTD  R elbow wound, S/p IR for elbow drainage 10/13 + Few Proteus mirabilis ESBL, Meropenem 7 day trial completed 10/19-> reordered 10/20 for reaccumulation of elbow bursitis-  10/22 plastics debrided R elbow eschar to subc tissue, ~8cc fluid aspirated and sent for culture. Wet to dry dressing changed 2x daily. PT changed vac yesterday  Joint Fluid Cx 10/22 - p. mirabilis  Meropenem for 8 day course (until 10/28),started in setting of elbow collection 10/22,Course completed.  cont vanco and flluc    ***Endocrine***  [x]  DM : HbA1c 9.4%                - [x] ISS  [x] NPH              - Need tight glycemic control to prevent wound infection.  Endocrine following, appreciate recommendations        Patient requires continuous monitoring with bedside rhythm monitoring, pulse oximetry monitoring, and continuous central venous and arterial pressure monitoring; and intermittent blood gas analysis. Care plan discussed with the ICU care team.   Patient remained critical, at risk for life threatening decompensation.    I have spent 35 minutes providing critical care management to this patient.    By signing my name below, I, Bharti Barrios, attest that this documentation has been prepared under the direction and in the presence of KIANA Issa.  Electronically signed: Georgi Gottlieb, 11-07-22 @ 07:42    I, Mackenzie Riggins, personally performed the services described in this documentation. all medical record entries made by the scribe were at my direction and in my presence. I have reviewed the chart and agree that the record reflects my personal performance and is accurate and complete  Electronically signed: KIANA Issa.

## 2022-11-07 NOTE — PROGRESS NOTE ADULT - SUBJECTIVE AND OBJECTIVE BOX
DIABETES FOLLOW UP NOTE: Saw pt earlier today    Chief Complaint: Endocrine consult requested for management of T2DM    INTERVAL HX: Saw pt in CTU, per primary team,  pt tolerating 24 hour TFs of Glucerna at 65cc/hr. BG levels still not at goal between 60s to 200s in the last 24 hours. Noted hypoglycemia yesterday at 6pm with BG 60s after getting NPH 10 units at 12noon> decreased dose to 5 units at 12noon today. Also remains with hyperglycemia at 12noon after getting chronic dose of Prednisone 8mg for AI. Remains on antibiotics for sepsis. Pt able to talk today and answered simple questions via trach voice valve     Review of Systems:  General: As above  Cardiovascular: No chest pain, palpitations  Respiratory: No SOB, no cough  GI: No nausea, vomiting, abdominal pain  Endocrine: No S&Sx of hypoglycemia    Allergies    aspirin (Short breath)  Avelox (Short breath; Pruritus)  cefepime (Anaphylaxis)  codeine (Short breath)  Dilaudid (Short breath)  iodine (Short breath; Swelling)  penicillin (Anaphylaxis)  shellfish (Anaphylaxis)  tetanus toxoid (Short breath)  Valium (Short breath)    Intolerances      MEDICATIONS:  fluconAZOLE IVPB 600 milliGRAM(s) IV Intermittent every 24 hours  insulin lispro (ADMELOG) corrective regimen sliding scale   SubCutaneous every 6 hours  insulin NPH human recombinant 5 Unit(s) SubCutaneous <User Schedule>  insulin NPH human recombinant 46 Unit(s) SubCutaneous <User Schedule>  insulin NPH human recombinant 8 Unit(s) SubCutaneous <User Schedule>  methylPREDNISolone 8 milliGRAM(s) Oral daily  vancomycin  IVPB 750 milliGRAM(s) IV Intermittent every 12 hours        PHYSICAL EXAM:  VITALS: T(C): 36.7 (11-07-22 @ 12:00)  T(F): 98.1 (11-07-22 @ 12:00), Max: 98.4 (11-07-22 @ 08:00)  HR: 80 (11-07-22 @ 13:00) (64 - 91)  BP: 139/58 (11-07-22 @ 13:00) (119/56 - 158/68)  RR:  (22 - 36)  SpO2:  (97% - 100%)  Wt(kg): --  GENERAL: Female sitting in chair in NAD.   HEENT: Trach in place, NGT with TFs on at time of visit.    Chest: Sternal incision healed  Abdomen: Soft, nontender, non distended  Extremities: Warm, no edema in all 4 exts.   NEURO: Alert and able to answer simple questions    LABS:  POCT Blood Glucose.: 224 mg/dL (11-07-22 @ 11:43)  POCT Blood Glucose.: 143 mg/dL (11-07-22 @ 06:52)  POCT Blood Glucose.: 114 mg/dL (11-07-22 @ 00:54)  POCT Blood Glucose.: 127 mg/dL (11-06-22 @ 17:48)  POCT Blood Glucose.: 65 mg/dL (11-06-22 @ 17:14)  POCT Blood Glucose.: 66 mg/dL (11-06-22 @ 17:12)  POCT Blood Glucose.: 221 mg/dL (11-06-22 @ 12:28)  POCT Blood Glucose.: 142 mg/dL (11-06-22 @ 06:24)  POCT Blood Glucose.: 121 mg/dL (11-06-22 @ 01:11)  POCT Blood Glucose.: 151 mg/dL (11-05-22 @ 17:22)  POCT Blood Glucose.: 175 mg/dL (11-05-22 @ 12:17)  POCT Blood Glucose.: 90 mg/dL (11-05-22 @ 05:21)  POCT Blood Glucose.: 165 mg/dL (11-05-22 @ 02:19)  POCT Blood Glucose.: 49 mg/dL (11-05-22 @ 01:51)  POCT Blood Glucose.: 45 mg/dL (11-05-22 @ 01:47)  POCT Blood Glucose.: 273 mg/dL (11-04-22 @ 17:31)                            9.3    11.90 )-----------( 267      ( 07 Nov 2022 03:00 )             30.3       11-07    138  |  102  |  14  ----------------------------<  105<H>  3.9   |  29  |  0.40<L>    eGFR: 104    Ca    9.2      11-07  Mg     1.8     11-07  Phos  3.5     11-07    TPro  6.4  /  Alb  2.9<L>  /  TBili  0.2  /  DBili  x   /  AST  18  /  ALT  17  /  AlkPhos  115  11-07        A1C with Estimated Average Glucose Result: 9.4 % (09-06-22 @ 16:46)      Estimated Average Glucose: 223 mg/dL (09-06-22 @ 16:46)

## 2022-11-07 NOTE — PROGRESS NOTE ADULT - PROBLEM SELECTOR PLAN 1
-test BG q6h if NPO/TF   -Adjust NPH dose:   12am: c/w NPH 8 units   6am: increase dose to NPH 48 units. Give 50% of dose if BG less than 120mg/dl. DO NOT HOLD IF RECEIVING TF  12pm: decrease dose NPH 5 units.    6pm: Discontinue NPH dose for now.   -If TFs off after NPH is given please start D5 infusion at TF rate to prevent rebound hypoglycemia.   -C/w Admelog moderate correction scale q6h for now  Discharge plan:  - Likely to discharge patient on basal/bolus insulin. Final regimen pending clinical course.  - Recommend routine outpatient ophthalmology, podiatry  - Can f/u with endocrinologist Dr. Saavedra.  - Make sure pt has Rx for all DM supplies and insulin/ DM meds.  -Based on pt's clinical condition and mental status at this time she is not able to independently manage her DM. Will evaluate pt's ability to manage DM at time of discharge. If unable> pt will need family/ care giver (s) to manage DM care at home  -Will need rehab.

## 2022-11-07 NOTE — PROGRESS NOTE ADULT - ASSESSMENT

## 2022-11-07 NOTE — PROGRESS NOTE ADULT - NSPROGADDITIONALINFOA_GEN_ALL_CORE
-Plan discussed with pt/team.  Contact info: [phone] (24/7). pager [phone]  [email or web address] password [email or web address]  Teams  Spent 28 minutes assessing pt/labs/meds and discussing plan of care with primary team  Adjusting insulin  Discharge plan  Follow up care

## 2022-11-08 LAB
ALBUMIN SERPL ELPH-MCNC: 2.9 G/DL — LOW (ref 3.3–5)
ALP SERPL-CCNC: 123 U/L — HIGH (ref 40–120)
ALT FLD-CCNC: 15 U/L — SIGNIFICANT CHANGE UP (ref 10–45)
ANION GAP SERPL CALC-SCNC: 14 MMOL/L — SIGNIFICANT CHANGE UP (ref 5–17)
AST SERPL-CCNC: 25 U/L — SIGNIFICANT CHANGE UP (ref 10–40)
BILIRUB SERPL-MCNC: 0.3 MG/DL — SIGNIFICANT CHANGE UP (ref 0.2–1.2)
BUN SERPL-MCNC: 16 MG/DL — SIGNIFICANT CHANGE UP (ref 7–23)
CALCIUM SERPL-MCNC: 9 MG/DL — SIGNIFICANT CHANGE UP (ref 8.4–10.5)
CHLORIDE SERPL-SCNC: 100 MMOL/L — SIGNIFICANT CHANGE UP (ref 96–108)
CO2 SERPL-SCNC: 22 MMOL/L — SIGNIFICANT CHANGE UP (ref 22–31)
CREAT SERPL-MCNC: 0.42 MG/DL — LOW (ref 0.5–1.3)
EGFR: 103 ML/MIN/1.73M2 — SIGNIFICANT CHANGE UP
GLUCOSE BLDC GLUCOMTR-MCNC: 146 MG/DL — HIGH (ref 70–99)
GLUCOSE BLDC GLUCOMTR-MCNC: 181 MG/DL — HIGH (ref 70–99)
GLUCOSE BLDC GLUCOMTR-MCNC: 187 MG/DL — HIGH (ref 70–99)
GLUCOSE BLDC GLUCOMTR-MCNC: 259 MG/DL — HIGH (ref 70–99)
GLUCOSE BLDC GLUCOMTR-MCNC: 88 MG/DL — SIGNIFICANT CHANGE UP (ref 70–99)
GLUCOSE SERPL-MCNC: 180 MG/DL — HIGH (ref 70–99)
HCT VFR BLD CALC: 33.4 % — LOW (ref 34.5–45)
HGB BLD-MCNC: 10 G/DL — LOW (ref 11.5–15.5)
MAGNESIUM SERPL-MCNC: 1.8 MG/DL — SIGNIFICANT CHANGE UP (ref 1.6–2.6)
MCHC RBC-ENTMCNC: 23.5 PG — LOW (ref 27–34)
MCHC RBC-ENTMCNC: 29.9 GM/DL — LOW (ref 32–36)
MCV RBC AUTO: 78.6 FL — LOW (ref 80–100)
NRBC # BLD: 0 /100 WBCS — SIGNIFICANT CHANGE UP (ref 0–0)
PHOSPHATE SERPL-MCNC: 3.8 MG/DL — SIGNIFICANT CHANGE UP (ref 2.5–4.5)
PLATELET # BLD AUTO: 221 K/UL — SIGNIFICANT CHANGE UP (ref 150–400)
POTASSIUM SERPL-MCNC: 4.7 MMOL/L — SIGNIFICANT CHANGE UP (ref 3.5–5.3)
POTASSIUM SERPL-SCNC: 4.7 MMOL/L — SIGNIFICANT CHANGE UP (ref 3.5–5.3)
PROT SERPL-MCNC: 6.7 G/DL — SIGNIFICANT CHANGE UP (ref 6–8.3)
RBC # BLD: 4.25 M/UL — SIGNIFICANT CHANGE UP (ref 3.8–5.2)
RBC # FLD: 21.6 % — HIGH (ref 10.3–14.5)
SODIUM SERPL-SCNC: 136 MMOL/L — SIGNIFICANT CHANGE UP (ref 135–145)
WBC # BLD: 12.36 K/UL — HIGH (ref 3.8–10.5)
WBC # FLD AUTO: 12.36 K/UL — HIGH (ref 3.8–10.5)

## 2022-11-08 PROCEDURE — 99291 CRITICAL CARE FIRST HOUR: CPT | Mod: 24

## 2022-11-08 PROCEDURE — 99232 SBSQ HOSP IP/OBS MODERATE 35: CPT

## 2022-11-08 PROCEDURE — 71045 X-RAY EXAM CHEST 1 VIEW: CPT | Mod: 26

## 2022-11-08 RX ORDER — HUMAN INSULIN 100 [IU]/ML
50 INJECTION, SUSPENSION SUBCUTANEOUS
Refills: 0 | Status: DISCONTINUED | OUTPATIENT
Start: 2022-11-08 | End: 2022-11-11

## 2022-11-08 RX ADMIN — APIXABAN 5 MILLIGRAM(S): 2.5 TABLET, FILM COATED ORAL at 05:29

## 2022-11-08 RX ADMIN — APIXABAN 5 MILLIGRAM(S): 2.5 TABLET, FILM COATED ORAL at 18:01

## 2022-11-08 RX ADMIN — Medication 50 MILLIGRAM(S): at 22:06

## 2022-11-08 RX ADMIN — Medication 3 MILLILITER(S): at 17:08

## 2022-11-08 RX ADMIN — MEXILETINE HYDROCHLORIDE 200 MILLIGRAM(S): 150 CAPSULE ORAL at 22:06

## 2022-11-08 RX ADMIN — Medication 2: at 18:12

## 2022-11-08 RX ADMIN — NYSTATIN CREAM 1 APPLICATION(S): 100000 CREAM TOPICAL at 05:31

## 2022-11-08 RX ADMIN — Medication 50 MILLIGRAM(S): at 05:29

## 2022-11-08 RX ADMIN — Medication 12.5 MILLIGRAM(S): at 18:04

## 2022-11-08 RX ADMIN — Medication 1 DROP(S): at 18:10

## 2022-11-08 RX ADMIN — SODIUM CHLORIDE 10 MILLILITER(S): 9 INJECTION INTRAMUSCULAR; INTRAVENOUS; SUBCUTANEOUS at 05:30

## 2022-11-08 RX ADMIN — MAGNESIUM OXIDE 400 MG ORAL TABLET 400 MILLIGRAM(S): 241.3 TABLET ORAL at 05:34

## 2022-11-08 RX ADMIN — Medication 0.5 MILLIGRAM(S): at 05:21

## 2022-11-08 RX ADMIN — CHLORHEXIDINE GLUCONATE 1 APPLICATION(S): 213 SOLUTION TOPICAL at 05:30

## 2022-11-08 RX ADMIN — Medication 3 MILLILITER(S): at 05:21

## 2022-11-08 RX ADMIN — MAGNESIUM OXIDE 400 MG ORAL TABLET 400 MILLIGRAM(S): 241.3 TABLET ORAL at 22:16

## 2022-11-08 RX ADMIN — HUMAN INSULIN 48 UNIT(S): 100 INJECTION, SUSPENSION SUBCUTANEOUS at 06:06

## 2022-11-08 RX ADMIN — Medication 2: at 00:27

## 2022-11-08 RX ADMIN — Medication 5 MILLIGRAM(S): at 22:05

## 2022-11-08 RX ADMIN — FLUCONAZOLE 150 MILLIGRAM(S): 150 TABLET ORAL at 22:05

## 2022-11-08 RX ADMIN — NYSTATIN CREAM 1 APPLICATION(S): 100000 CREAM TOPICAL at 18:10

## 2022-11-08 RX ADMIN — HUMAN INSULIN 50 UNIT(S): 100 INJECTION, SUSPENSION SUBCUTANEOUS at 18:11

## 2022-11-08 RX ADMIN — Medication 8 MILLIGRAM(S): at 05:32

## 2022-11-08 RX ADMIN — MEXILETINE HYDROCHLORIDE 200 MILLIGRAM(S): 150 CAPSULE ORAL at 05:29

## 2022-11-08 RX ADMIN — Medication 3 MILLILITER(S): at 23:17

## 2022-11-08 RX ADMIN — Medication 5 MILLIGRAM(S): at 05:28

## 2022-11-08 RX ADMIN — Medication 4 MILLILITER(S): at 05:21

## 2022-11-08 RX ADMIN — HUMAN INSULIN 8 UNIT(S): 100 INJECTION, SUSPENSION SUBCUTANEOUS at 00:26

## 2022-11-08 RX ADMIN — Medication 12.5 MILLIGRAM(S): at 05:29

## 2022-11-08 RX ADMIN — Medication 0.5 MILLIGRAM(S): at 17:10

## 2022-11-08 NOTE — PROGRESS NOTE ADULT - SUBJECTIVE AND OBJECTIVE BOX
CHIEF COMPLAINT: f/up sob, chronic resp failure, TBM, severe persistent asthma, VC dysfunction, Type A aortic dissection s/p repair w/modified "Bentall procedure and hemiarch replacement 9/6-trached--on TC-feels well-arm  pain is only complaint at present    Interval Events: speaking valve trials    REVIEW OF SYSTEMS:  Constitutional: No fevers or chills. No weight loss. No fatigue or generalized malaise.  Eyes: No itching or discharge from the eyes  ENT: No ear pain. No ear discharge. No nasal congestion. No post nasal drip. No epistaxis. No throat pain. No sore throat. No difficulty swallowing.   CV: No chest pain. No palpitations. No lightheadedness or dizziness.   Resp: No dyspnea at rest. No dyspnea on exertion. No orthopnea. No wheezing. No cough. No stridor. No sputum production. No chest pain with respiration.  GI: No nausea. No vomiting. No diarrhea.  MSK: No joint pain or pain in any extremities  Integumentary: No skin lesions. No pedal edema.  Neurological: + gross motor weakness. No sensory changes.  [+ ] All other systems negative  [ ] Unable to assess ROS because ________    OBJECTIVE:  ICU Vital Signs Last 24 Hrs  T(C): 37.1 (08 Nov 2022 03:29), Max: 37.1 (08 Nov 2022 03:29)  T(F): 98.7 (08 Nov 2022 03:29), Max: 98.7 (08 Nov 2022 03:29)  HR: 80 (08 Nov 2022 04:00) (70 - 91)  BP: 109/51 (08 Nov 2022 04:00) (109/51 - 166/75)  BP(mean): 74 (08 Nov 2022 04:00) (72 - 117)  ABP: --  ABP(mean): --  RR: 33 (08 Nov 2022 04:00) (22 - 37)  SpO2: 97% (08 Nov 2022 04:00) (97% - 100%)    O2 Parameters below as of 08 Nov 2022 03:29  Patient On (Oxygen Delivery Method): tracheostomy collar  O2 Flow (L/min): 10  O2 Concentration (%): 50          11-06 @ 07:01  -  11-07 @ 07:00  --------------------------------------------------------  IN: 1955 mL / OUT: 1500 mL / NET: 455 mL    11-07 @ 07:01  - 11-08 @ 05:16  --------------------------------------------------------  IN: 2065 mL / OUT: 1270 mL / NET: 795 mL      CAPILLARY BLOOD GLUCOSE      POCT Blood Glucose.: 187 mg/dL (08 Nov 2022 00:06)      PHYSICAL EXAM: NAD in bed on TC  General: Awake, alert, oriented X 3.   HEENT: Atraumatic, normocephalic.                 Mallampatti Grade 3                No nasal congestion.                No tonsillar or pharyngeal exudates.  Lymph Nodes: No palpable lymphadenopathy  Neck: No JVD. No carotid bruit.   Respiratory: abnormal chest expansion                         Normal percussion                         Normal and equal air entry                         No wheeze, rhonchi or rales.  Cardiovascular: S1 S2 normal. No murmurs, rubs or gallops.   Abdomen: Soft, non-tender, non-distended. No organomegaly. Normoactive bowel sounds.  Extremities: Warm to touch. Peripheral pulse palpable. No pedal edema. right arm vac in place  Skin: No rashes or skin lesions  Neurological: Motor and sensory examination equal and abnormal in all four extremities.  Psychiatry: Appropriate mood and affect.    HOSPITAL MEDICATIONS:  MEDICATIONS  (STANDING):  acetylcysteine 10%  Inhalation 4 milliLiter(s) Inhalation every 12 hours  albuterol/ipratropium for Nebulization 3 milliLiter(s) Nebulizer every 6 hours  apixaban 5 milliGRAM(s) Oral every 12 hours  ascorbic acid 500 milliGRAM(s) Oral daily  buDESOnide    Inhalation Suspension 0.5 milliGRAM(s) Inhalation every 12 hours  chlorhexidine 2% Cloths 1 Application(s) Topical <User Schedule>  collagenase Ointment 1 Application(s) Topical daily  diphenhydramine 2%/zinc acetate 0.1% Cream 1 Application(s) Topical every 8 hours  dorzolamide 2% Ophthalmic Solution 1 Drop(s) Right EYE three times a day  fluconAZOLE IVPB 600 milliGRAM(s) IV Intermittent every 24 hours  hydrALAZINE 50 milliGRAM(s) Oral every 8 hours  insulin lispro (ADMELOG) corrective regimen sliding scale   SubCutaneous every 6 hours  insulin NPH human recombinant 48 Unit(s) SubCutaneous <User Schedule>  insulin NPH human recombinant 5 Unit(s) SubCutaneous <User Schedule>  insulin NPH human recombinant 8 Unit(s) SubCutaneous <User Schedule>  magnesium oxide 400 milliGRAM(s) Oral every 8 hours  methylPREDNISolone 8 milliGRAM(s) Oral daily  metoclopramide Injectable 5 milliGRAM(s) IV Push every 8 hours  metoprolol tartrate 12.5 milliGRAM(s) Oral every 12 hours  mexiletine 200 milliGRAM(s) Oral every 8 hours  multivitamin 1 Tablet(s) Oral daily  nystatin Powder 1 Application(s) Topical two times a day  pantoprazole  Injectable 40 milliGRAM(s) IV Push daily  sodium chloride 0.9%. 1000 milliLiter(s) (10 mL/Hr) IV Continuous <Continuous>  timolol 0.5% Solution 1 Drop(s) Right EYE two times a day  vancomycin  IVPB 750 milliGRAM(s) IV Intermittent every 12 hours  vancomycin  IVPB        MEDICATIONS  (PRN):  acetaminophen    Suspension .. 650 milliGRAM(s) Oral every 6 hours PRN Temp greater or equal to 38C (100.4F), Mild Pain (1 - 3)      LABS:                        10.0   12.36 )-----------( 221      ( 08 Nov 2022 00:37 )             33.4     11-08    136  |  100  |  16  ----------------------------<  180<H>  4.7   |  22  |  0.42<L>    Ca    9.0      08 Nov 2022 00:35  Phos  3.8     11-08  Mg     1.8     11-08    TPro  6.7  /  Alb  2.9<L>  /  TBili  0.3  /  DBili  x   /  AST  25  /  ALT  15  /  AlkPhos  123<H>  11-08              MICROBIOLOGY:     RADIOLOGY:  [ ] Reviewed and interpreted by me    Point of Care Ultrasound Findings:    PFT:    EKG:

## 2022-11-08 NOTE — PROGRESS NOTE ADULT - NSPROGADDITIONALINFOA_GEN_ALL_CORE
-Plan discussed with pt/team.  Contact info: [phone] (24/7). pager [phone]  [email or web address] password [email or web address]  Teams  Spent 28 minutes assessing pt/labs/meds and discussing plan of care with primary team  Adjusting insulin  Discharge plan  Follow up care

## 2022-11-08 NOTE — PROGRESS NOTE ADULT - TIME BILLING
as above: resp stable-speaking valve in place--ambulating daily  -ID f/up-resp stable--wound care f/up-TC continues- (she will always have secretions) due to TBM-s/p  trach 10/10-s/p  resp failure-s/p vwcodf-UPCA-pt ABX-stable CV status-re- VEST rx in use  multifactorial dyspnea-resp failure-severe persistent asthma, TBM s/p tracheoplasty, s/p Aortic aneurysm repair, Bronchitis (proteus)-O2-keep 90%;TC  severe persistent asthma--medrol 8mg, singulair 10, duoneb q 6, budes .5 bid, tezspire 8/29-was due 9/29-next upon DC  TBM-s/p tracheoplasty--accapella, vest rx, mucomyst here 2 cc 10% q 8 (secretions)  s/p Aortic aneurysm repair--as per CTS staff care  AF-on eliquis rx               CV-improved hydralazine/lopressor/mexilitine   DVT R-IJ-on heparin rx  *****ID-s/p proteus bronchitis-s/p meropenum changed to ceftriaxone and back to meropenem/vanco- off of ABX as of 9/19--restart of ABX 9/27-meropenem/vanco-NOW on meropenem and vanco for MRSE sepsis (11/15 end date for vanco), plastics/ortho for elbow-proteus (?wash out)-vac in place-suppressive rx-bact/fluconazole  PT-OOB as able                             GI-TF as able--f/up LFTs-normal       ****ORTHO f/up--? additional wash out of elbow wound?; wound care f/up  **VC dysfunction--aspiration precautions-s/p trach 10/10-ENT f/up s/p laryngoscopy-? decannulation    Heme onc f/up colon ca  prog--critical in CTU                PT-to continue-more OOB     DC planning    Eulalio Allison MD-Pulmonary   [phone]

## 2022-11-08 NOTE — PROGRESS NOTE ADULT - ASSESSMENT

## 2022-11-08 NOTE — PROGRESS NOTE ADULT - SUBJECTIVE AND OBJECTIVE BOX
DIABETES FOLLOW UP NOTE: Saw pt earlier today    Chief Complaint: Endocrine consult requested for management of T2DM    INTERVAL HX: Saw pt in CTU, pt tolerating 24 hour TFs of Glucerna at 65cc/hr. BG levels still not at goal between 80s to 200s in the last 24 hours. Noted BG <100 at 6pm yesterday even though NPH dose was reduced at 12noon. Also remains with hyperglycemia at 12noon after getting chronic dose of Prednisone 8mg for AI. On antibiotics for sepsis. Pt able to talk via trach voice valve. Sleepy at time of visit today     Review of Systems:  General: As above  Cardiovascular: No chest pain, palpitations  Respiratory: No SOB, no cough  GI: No nausea, vomiting, abdominal pain  Endocrine: NoS&Sx of hypoglycemia    Allergies    aspirin (Short breath)  Avelox (Short breath; Pruritus)  cefepime (Anaphylaxis)  codeine (Short breath)  Dilaudid (Short breath)  iodine (Short breath; Swelling)  penicillin (Anaphylaxis)  shellfish (Anaphylaxis)  tetanus toxoid (Short breath)  Valium (Short breath)    Intolerances      MEDICATIONS  fluconAZOLE IVPB 600 milliGRAM(s) IV Intermittent every 24 hours  insulin lispro (ADMELOG) corrective regimen sliding scale   SubCutaneous every 6 hours  insulin NPH human recombinant 8 Unit(s) SubCutaneous <User Schedule>  insulin NPH human recombinant 48 Unit(s) SubCutaneous <User Schedule>  methylPREDNISolone 8 milliGRAM(s) Oral daily  vancomycin  IVPB 750 milliGRAM(s) IV Intermittent every 12 hours          PHYSICAL EXAM:  VITALS: T(C): 35.8 (11-08-22 @ 12:00)  T(F): 96.5 (11-08-22 @ 12:00), Max: 98.7 (11-08-22 @ 03:29)  HR: 93 (11-08-22 @ 15:12) (65 - 95)  BP: 105/50 (11-08-22 @ 15:00) (99/47 - 164/66)  RR:  (22 - 37)  SpO2:  (93% - 100%)  Wt(kg): --  GENERAL: Female sitting in chair in NAD.   HEENT: Trach in place, NGT with TFs on at time of visit.    Chest: Sternal incision healed  Abdomen: Soft, nontender, non distended  Extremities: Warm, no edema in all 4 exts. RUE wound to vac with whitish foamy out put.   NEURO: Alert, sleepy but able to answer simple questions    LABS:  POCT Blood Glucose.: 259 mg/dL (11-08-22 @ 11:39)  POCT Blood Glucose.: 146 mg/dL (11-08-22 @ 06:02)  POCT Blood Glucose.: 187 mg/dL (11-08-22 @ 00:06)  POCT Blood Glucose.: 89 mg/dL (11-07-22 @ 17:18)  POCT Blood Glucose.: 224 mg/dL (11-07-22 @ 11:43)  POCT Blood Glucose.: 143 mg/dL (11-07-22 @ 06:52)  POCT Blood Glucose.: 114 mg/dL (11-07-22 @ 00:54)  POCT Blood Glucose.: 127 mg/dL (11-06-22 @ 17:48)  POCT Blood Glucose.: 65 mg/dL (11-06-22 @ 17:14)  POCT Blood Glucose.: 66 mg/dL (11-06-22 @ 17:12)  POCT Blood Glucose.: 221 mg/dL (11-06-22 @ 12:28)  POCT Blood Glucose.: 142 mg/dL (11-06-22 @ 06:24)  POCT Blood Glucose.: 121 mg/dL (11-06-22 @ 01:11)  POCT Blood Glucose.: 151 mg/dL (11-05-22 @ 17:22)                            10.0   12.36 )-----------( 221      ( 08 Nov 2022 00:37 )             33.4       11-08    136  |  100  |  16  ----------------------------<  180<H>  4.7   |  22  |  0.42<L>    eGFR: 103    Ca    9.0      11-08  Mg     1.8     11-08  Phos  3.8     11-08    TPro  6.7  /  Alb  2.9<L>  /  TBili  0.3  /  DBili  x   /  AST  25  /  ALT  15  /  AlkPhos  123<H>  11-08      A1C with Estimated Average Glucose Result: 9.4 % (09-06-22 @ 16:46)      Estimated Average Glucose: 223 mg/dL (09-06-22 @ 16:46)

## 2022-11-08 NOTE — PROGRESS NOTE ADULT - ASSESSMENT
72 F w/h/o uncontrolled T2DM (A1C 9.4%) on basal/bolus insulin PTA. Unknown DM complications. Also COPD, secondary adrenal Insufficiency on chronic steroids, colorectal cancer s/p resection (colostomy bag), Chronic A fib on Eliquis, and tracheomalacia and multiple intubations, recent dx of OM presented to ED at [facility] with epigastric pain, belching and central chest pain. Found on CTA to have type A aortic dissection and transferred to [facility] for surgical evaluation by Dr. Cabrera. Now s/p aortic dissection repair on 9/07/22. Endocrine consulted for assistance with uncontrolled DM/steroids for AI. Pt in ICU with ventricular dysfunction/sepsis, and now s/p trach 10/10 and more recently s/p R elbow eschar debridement 10/22 > Wound VAC 10/24.  On Prednisone dose 8mg for AI. Tolerating TF @ goal rate w/ BG not at goal while on present insulin doses.  Hyperglycemic at 12noon and BG still going down at 6pm while getting low dose 12 noon NPH insulin. Will continue to increase am NPH dose when pt takes steroid dose. Will discontinue 12 noon NPH to keep BG goal (100-180mg/dl).

## 2022-11-08 NOTE — PROGRESS NOTE ADULT - SUBJECTIVE AND OBJECTIVE BOX
Patient seen and examined at the bedside.    Remained critically ill on continuous ICU monitoring.    OBJECTIVE:  Vital Signs Last 24 Hrs  T(C): 37.1 (08 Nov 2022 03:29), Max: 37.1 (08 Nov 2022 03:29)  T(F): 98.7 (08 Nov 2022 03:29), Max: 98.7 (08 Nov 2022 03:29)  HR: 65 (08 Nov 2022 05:45) (65 - 91)  BP: 109/51 (08 Nov 2022 04:00) (109/51 - 166/75)  BP(mean): 74 (08 Nov 2022 04:00) (72 - 117)  RR: 33 (08 Nov 2022 04:00) (22 - 37)  SpO2: 99% (08 Nov 2022 05:45) (97% - 100%)    Parameters below as of 08 Nov 2022 05:45  Patient On (Oxygen Delivery Method): tracheostomy collar          Physical Exam:   General: Trach collar, NAD  Neurology: Nonfocal, responds to commands. Moves all extremities  ENT/Neck: + trach c/d/i, neck supple, trachea midline   Respiratory: coarse BS b/l, rhonchi throughout, minimal secretions present  CV: S1S2, no murmurs        [x] Sinus Rhythm   Abdominal: Soft, NT, ND, colostomy in place (stoma intact)  Extremities: nonpitting edema, improved. Right elbow wound vac c/d/i. warm and well-perfused.  Skin: Dry dressing over right heel. No cyanosis      Assessment:  71F PMH DM, COPD, chronic adrenal insufficiency on Prednisone, history of colorectal cancer s/p resection (colostomy bag), Hx of CAD, chronic AF on Eliquis, and tracheomalacia s/p multiple intubations, recent dx of R foot OM s/p debridement on antibiotics and presented to ED at [facility] this morning with epigastric pain, belching and central chest pain. Found on CTA to have type A aortic dissection and transferred to [facility] for surgical evaluation by Dr. Cabrera.     Ascending aortic dissection s/p type A dissection repair and Biobentall on 9/7   Respiratory failure s/p Trach 10/10/22  Hypovolemia   Post op respiratory failure   Acute blood loss anemia  Stress hyperglycemia   Leukocytosis   RIJ thrombus  MRSA PNA        Plan:   ***Neuro***  [x] Nonfocal  follows commands, continue to monitor  PT/OT progress as tolerated, ambulated with physical therapy  OOBTC    ***Cardiovascular***  Limited TTE on 10/1: EF 69%, Hyperdynamic left ventricular systolic function. There is hypokinesis of the mid-base inferior wall. Nml RV fxn.   CT chest on 9/28: No CT evidence of pulmonary embolism. Status post repair of ascending aortic dissection with a stable dissection flap originating from the aortic arch and extending into the infrarenal abdominal aorta,  B/l UE duplex on 10/5: As before, there is an occluded RIGHT IJ vein with thrombosis extending to brachiocephalic vein. Superficial thrombophlebitis of the LEFT cephalic vein.   Invasive hemodynamic monitoring, assess perfusion indices   SR / Hct 33.4/ Lactate 1.5  Continuous reassessment of hemodynamics   Hydralazine PO for BP management   Rate control with Lopressor and mexiletine   WVAC on rt elbow changed 11/4  [x] AC Therapy with Eliquis 5 BID for Afib and IJ thrombus  Serial EKG and cardiac enzymes     ***Pulmonary***  Trach collar 10L/40% since 10/25, consult with Dr. Cabrera about decannulation tomorrow?  Respiratory failure s/p Trach #8 portex  10/10/22, per ENT inner cannula needs to be changed daily, trach sutures d/c'ed by ENT 10/17 , 40% trach collar for 2 days  Titration of FiO2, follow SpO2, CXR, blood gases   Bronched 10/13  Continue duonebs  Suction q2hrly as needed   Aggressive Pulmonary toilet  Downsized to 7 cuffless trach 11/3       ***GI***  [x] Diet: TFs Glucerna 1.2 @ goal 65ml/hr, tolerating without issues (when glucerna 1.5 back in stock will switch and decrease goal rate to 55ml/hr)  [x] Protonix    Reglan for gut motility     ***Renal***  Continue to monitor I/Os, BUN/Creatinine.   Replete lytes PRN    ***ID***  SCx on 10/4+Methicillin resistant Staphylococcus aureus, c/w IVPB Vancomycin, (plan for 6 week course in setting of valve surgery, 11/26 end date)  9/27 blood culture Neela Glabarata, on flucanazole (lifelong suppression)  BCx 10/13 NGTD, BC 10/21 and SCx NGTD  R elbow wound, S/p IR for elbow drainage 10/13 + Few Proteus mirabilis ESBL, Meropenem 7 day trial completed 10/19-> reordered 10/20 for reaccumulation of elbow bursitis-  10/22 plastics debrided R elbow eschar to subc tissue, ~8cc fluid aspirated and sent for culture. Wet to dry dressing changed 2x daily. PT changed vac yesterday  Joint Fluid Cx 10/22 - p. mirabilis  Meropenem for 8 day course (until 10/28),started in setting of elbow collection 10/22,Course completed.  cont vanco and fluc    ***Endocrine***  [x]  DM : HbA1c 9.4%                - [x] ISS  [x] NPH              - Need tight glycemic control to prevent wound infection.  Endocrine following, appreciate recommendations          Patient requires continuous monitoring with bedside rhythm monitoring, pulse oximetry monitoring, and continuous central venous and arterial pressure monitoring; and intermittent blood gas analysis. Care plan discussed with the ICU care team.   Patient remained critical, at risk for life threatening decompensation.    I have spent 35 minutes providing critical care management to this patient.    By signing my name below, I, Kieran Plascencia, attest that this documentation has been prepared under the direction and in the presence of KIANA Issa   Electronically signed: Georgi Cordero, 11-08-22 @ 07:24    I, KIANA Issa, personally performed the services described in this documentation. all medical record entries made by the marcyibronaldo were at my direction and in my presence. I have reviewed the chart and agree that the record reflects my personal performance and is accurate and complete  Electronically signed: KIANA Issa  Patient seen and examined at the bedside.    Remained critically ill on continuous ICU monitoring.    OBJECTIVE:  Vital Signs Last 24 Hrs  T(C): 37.1 (08 Nov 2022 03:29), Max: 37.1 (08 Nov 2022 03:29)  T(F): 98.7 (08 Nov 2022 03:29), Max: 98.7 (08 Nov 2022 03:29)  HR: 65 (08 Nov 2022 05:45) (65 - 91)  BP: 109/51 (08 Nov 2022 04:00) (109/51 - 166/75)  BP(mean): 74 (08 Nov 2022 04:00) (72 - 117)  RR: 33 (08 Nov 2022 04:00) (22 - 37)  SpO2: 99% (08 Nov 2022 05:45) (97% - 100%)    Parameters below as of 08 Nov 2022 05:45  Patient On (Oxygen Delivery Method): tracheostomy collar          Physical Exam:   General: Trach collar, NAD  Neurology: Nonfocal, responds to commands. Moves all extremities  ENT/Neck: + trach c/d/i, neck supple, trachea midline   Respiratory: coarse BS b/l, rhonchi throughout, minimal secretions present  CV: S1S2, no murmurs        [x] Sinus Rhythm   Abdominal: Soft, NT, ND, colostomy in place (stoma intact)  Extremities: nonpitting edema, improved. Right elbow wound vac c/d/i. warm and well-perfused.  Skin: Dry dressing over right heel. No cyanosis      Assessment:  71F PMH DM, COPD, chronic adrenal insufficiency on Prednisone, history of colorectal cancer s/p resection (colostomy bag), Hx of CAD, chronic AF on Eliquis, and tracheomalacia s/p multiple intubations, recent dx of R foot OM s/p debridement on antibiotics and presented to ED at [facility] this morning with epigastric pain, belching and central chest pain. Found on CTA to have type A aortic dissection and transferred to [facility] for surgical evaluation by Dr. Cabrera.     Ascending aortic dissection s/p type A dissection repair and Biobentall on 9/7   Respiratory failure s/p Trach 10/10/22  Hypovolemia   Post op respiratory failure   Acute blood loss anemia  Stress hyperglycemia   Leukocytosis   RIJ thrombus  MRSA PNA        Plan:   ***Neuro***  [x] Nonfocal  follows commands, continue to monitor  PT/OT progress as tolerated, ambulated with physical therapy  OOBTC    ***Cardiovascular***  Limited TTE on 10/1: EF 69%, Hyperdynamic left ventricular systolic function. There is hypokinesis of the mid-base inferior wall. Nml RV fxn.   CT chest on 9/28: No CT evidence of pulmonary embolism. Status post repair of ascending aortic dissection with a stable dissection flap originating from the aortic arch and extending into the infrarenal abdominal aorta,  B/l UE duplex on 10/5: As before, there is an occluded RIGHT IJ vein with thrombosis extending to brachiocephalic vein. Superficial thrombophlebitis of the LEFT cephalic vein.   Invasive hemodynamic monitoring, assess perfusion indices   SR / Hct 33.4/ Lactate 1.5  Continuous reassessment of hemodynamics   Hydralazine PO for BP management   Rate control with Lopressor and mexiletine   WVAC on rt elbow changed 11/4  [x] AC Therapy with Eliquis 5 BID for Afib and IJ thrombus  Serial EKG and cardiac enzymes     ***Pulmonary***  Trach collar 10L/40% since 10/25, consult with Dr. Cabrera about decannulation tomorrow?  Respiratory failure s/p Trach #8 portex  10/10/22, per ENT inner cannula needs to be changed daily, trach sutures d/c'ed by ENT 10/17 , 40% trach collar for 2 days  Titration of FiO2, follow SpO2, CXR, blood gases   Bronched 10/13  Continue duonebs  Suction q2hrly as needed   Aggressive Pulmonary toilet  Downsized to 7 cuffless trach 11/3       ***GI***  [x] Diet: TFs Glucerna 1.2 @ goal 65ml/hr, tolerating without issues (when glucerna 1.5 back in stock will switch and decrease goal rate to 55ml/hr)  [x] Protonix    Reglan for gut motility     ***Renal***  Continue to monitor I/Os, BUN/Creatinine.   Replete lytes PRN    ***ID***  SCx on 10/4+Methicillin resistant Staphylococcus aureus, c/w IVPB Vancomycin, (plan for 6 week course in setting of valve surgery, 11/26 end date)  9/27 blood culture Neela Glabarata, on flucanazole (lifelong suppression)  BCx 10/13 NGTD, BC 10/21 and SCx NGTD  R elbow wound, S/p IR for elbow drainage 10/13 + Few Proteus mirabilis ESBL, Meropenem 7 day trial completed 10/19-> reordered 10/20 for reaccumulation of elbow bursitis-  10/22 plastics debrided R elbow eschar to subc tissue, ~8cc fluid aspirated and sent for culture. Wet to dry dressing changed 2x daily. PT changed vac yesterday  Joint Fluid Cx 10/22 - p. mirabilis  Meropenem for 8 day course (until 10/28),started in setting of elbow collection 10/22,Course completed.  cont vanco and fluc  needs to be on isolation until vanco complete and then re culture 3 days later and if clear can be off iso    ***Endocrine***  [x]  DM : HbA1c 9.4%                - [x] ISS  [x] NPH              - Need tight glycemic control to prevent wound infection.  Endocrine following, appreciate recommendations          Patient requires continuous monitoring with bedside rhythm monitoring, pulse oximetry monitoring, and continuous central venous and arterial pressure monitoring; and intermittent blood gas analysis. Care plan discussed with the ICU care team.   Patient remained critical, at risk for life threatening decompensation.    I have spent 35 minutes providing critical care management to this patient.    By signing my name below, I, Kieran Plascencia, attest that this documentation has been prepared under the direction and in the presence of KIANA Issa   Electronically signed: Georgi Cordero, 11-08-22 @ 07:24    I, KIANA Issa, personally performed the services described in this documentation. all medical record entries made by the scribe were at my direction and in my presence. I have reviewed the chart and agree that the record reflects my personal performance and is accurate and complete  Electronically signed: KIANA Issa

## 2022-11-08 NOTE — PROGRESS NOTE ADULT - PROBLEM SELECTOR PLAN 1
-test BG q6h if NPO/TF   -Adjust NPH dose:   12am: c/w NPH 8 units   6am: increase dose to NPH 50 units. Give 50% of dose if BG less than 120mg/dl. DO NOT HOLD IF RECEIVING TF  12pm: discontinue NPH insulin     6pm: Discontinue NPH dose for now.   -If TFs off after NPH is given please start D5 infusion at TF rate to prevent rebound hypoglycemia.   -C/w Admelog moderate correction scale q6h for now  Discharge plan:  - Likely to discharge patient on basal/bolus insulin. Final regimen pending clinical course.  - Recommend routine outpatient ophthalmology, podiatry  - Can f/u with endocrinologist Dr. Saavedra.  - Make sure pt has Rx for all DM supplies and insulin/ DM meds.  -Based on pt's clinical condition and mental status at this time she is not able to independently manage her DM. Will evaluate pt's ability to manage DM at time of discharge. If unable> pt will need family/ care giver (s) to manage DM care at home  -Will need rehab.

## 2022-11-09 LAB
ALBUMIN SERPL ELPH-MCNC: 3.2 G/DL — LOW (ref 3.3–5)
ALP SERPL-CCNC: 116 U/L — SIGNIFICANT CHANGE UP (ref 40–120)
ALT FLD-CCNC: 14 U/L — SIGNIFICANT CHANGE UP (ref 10–45)
ANION GAP SERPL CALC-SCNC: 10 MMOL/L — SIGNIFICANT CHANGE UP (ref 5–17)
AST SERPL-CCNC: 15 U/L — SIGNIFICANT CHANGE UP (ref 10–40)
BILIRUB SERPL-MCNC: 0.2 MG/DL — SIGNIFICANT CHANGE UP (ref 0.2–1.2)
BLD GP AB SCN SERPL QL: NEGATIVE — SIGNIFICANT CHANGE UP
BUN SERPL-MCNC: 16 MG/DL — SIGNIFICANT CHANGE UP (ref 7–23)
CALCIUM SERPL-MCNC: 9.2 MG/DL — SIGNIFICANT CHANGE UP (ref 8.4–10.5)
CHLORIDE SERPL-SCNC: 99 MMOL/L — SIGNIFICANT CHANGE UP (ref 96–108)
CO2 SERPL-SCNC: 27 MMOL/L — SIGNIFICANT CHANGE UP (ref 22–31)
CREAT SERPL-MCNC: 0.42 MG/DL — LOW (ref 0.5–1.3)
EGFR: 103 ML/MIN/1.73M2 — SIGNIFICANT CHANGE UP
GLUCOSE BLDC GLUCOMTR-MCNC: 100 MG/DL — HIGH (ref 70–99)
GLUCOSE BLDC GLUCOMTR-MCNC: 104 MG/DL — HIGH (ref 70–99)
GLUCOSE BLDC GLUCOMTR-MCNC: 117 MG/DL — HIGH (ref 70–99)
GLUCOSE BLDC GLUCOMTR-MCNC: 121 MG/DL — HIGH (ref 70–99)
GLUCOSE BLDC GLUCOMTR-MCNC: 137 MG/DL — HIGH (ref 70–99)
GLUCOSE BLDC GLUCOMTR-MCNC: 146 MG/DL — HIGH (ref 70–99)
GLUCOSE BLDC GLUCOMTR-MCNC: 149 MG/DL — HIGH (ref 70–99)
GLUCOSE BLDC GLUCOMTR-MCNC: 179 MG/DL — HIGH (ref 70–99)
GLUCOSE BLDC GLUCOMTR-MCNC: 182 MG/DL — HIGH (ref 70–99)
GLUCOSE BLDC GLUCOMTR-MCNC: 190 MG/DL — HIGH (ref 70–99)
GLUCOSE BLDC GLUCOMTR-MCNC: 91 MG/DL — SIGNIFICANT CHANGE UP (ref 70–99)
GLUCOSE BLDC GLUCOMTR-MCNC: 93 MG/DL — SIGNIFICANT CHANGE UP (ref 70–99)
GLUCOSE SERPL-MCNC: 88 MG/DL — SIGNIFICANT CHANGE UP (ref 70–99)
HCT VFR BLD CALC: 30.2 % — LOW (ref 34.5–45)
HGB BLD-MCNC: 9.2 G/DL — LOW (ref 11.5–15.5)
MAGNESIUM SERPL-MCNC: 1.8 MG/DL — SIGNIFICANT CHANGE UP (ref 1.6–2.6)
MCHC RBC-ENTMCNC: 23.4 PG — LOW (ref 27–34)
MCHC RBC-ENTMCNC: 30.5 GM/DL — LOW (ref 32–36)
MCV RBC AUTO: 76.8 FL — LOW (ref 80–100)
NRBC # BLD: 0 /100 WBCS — SIGNIFICANT CHANGE UP (ref 0–0)
PHOSPHATE SERPL-MCNC: 3.6 MG/DL — SIGNIFICANT CHANGE UP (ref 2.5–4.5)
PLATELET # BLD AUTO: 280 K/UL — SIGNIFICANT CHANGE UP (ref 150–400)
POTASSIUM SERPL-MCNC: 4.1 MMOL/L — SIGNIFICANT CHANGE UP (ref 3.5–5.3)
POTASSIUM SERPL-SCNC: 4.1 MMOL/L — SIGNIFICANT CHANGE UP (ref 3.5–5.3)
PROT SERPL-MCNC: 6.7 G/DL — SIGNIFICANT CHANGE UP (ref 6–8.3)
RBC # BLD: 3.93 M/UL — SIGNIFICANT CHANGE UP (ref 3.8–5.2)
RBC # FLD: 21.1 % — HIGH (ref 10.3–14.5)
RH IG SCN BLD-IMP: POSITIVE — SIGNIFICANT CHANGE UP
SODIUM SERPL-SCNC: 136 MMOL/L — SIGNIFICANT CHANGE UP (ref 135–145)
VANCOMYCIN TROUGH SERPL-MCNC: 11.1 UG/ML — SIGNIFICANT CHANGE UP (ref 10–20)
WBC # BLD: 13.63 K/UL — HIGH (ref 3.8–10.5)
WBC # FLD AUTO: 13.63 K/UL — HIGH (ref 3.8–10.5)

## 2022-11-09 PROCEDURE — 71045 X-RAY EXAM CHEST 1 VIEW: CPT | Mod: 26

## 2022-11-09 PROCEDURE — 99232 SBSQ HOSP IP/OBS MODERATE 35: CPT

## 2022-11-09 PROCEDURE — 99291 CRITICAL CARE FIRST HOUR: CPT | Mod: 24

## 2022-11-09 RX ORDER — MAGNESIUM OXIDE 400 MG ORAL TABLET 241.3 MG
400 TABLET ORAL EVERY 8 HOURS
Refills: 0 | Status: DISCONTINUED | OUTPATIENT
Start: 2022-11-09 | End: 2022-11-22

## 2022-11-09 RX ORDER — MAGNESIUM SULFATE 500 MG/ML
2 VIAL (ML) INJECTION ONCE
Refills: 0 | Status: COMPLETED | OUTPATIENT
Start: 2022-11-09 | End: 2022-11-09

## 2022-11-09 RX ORDER — HYDRALAZINE HCL 50 MG
50 TABLET ORAL THREE TIMES A DAY
Refills: 0 | Status: DISCONTINUED | OUTPATIENT
Start: 2022-11-09 | End: 2022-11-22

## 2022-11-09 RX ORDER — ASCORBIC ACID 60 MG
500 TABLET,CHEWABLE ORAL DAILY
Refills: 0 | Status: DISCONTINUED | OUTPATIENT
Start: 2022-11-09 | End: 2022-11-22

## 2022-11-09 RX ORDER — APIXABAN 2.5 MG/1
5 TABLET, FILM COATED ORAL EVERY 12 HOURS
Refills: 0 | Status: DISCONTINUED | OUTPATIENT
Start: 2022-11-09 | End: 2022-11-22

## 2022-11-09 RX ORDER — METOPROLOL TARTRATE 50 MG
12.5 TABLET ORAL
Refills: 0 | Status: DISCONTINUED | OUTPATIENT
Start: 2022-11-09 | End: 2022-11-12

## 2022-11-09 RX ADMIN — NYSTATIN CREAM 1 APPLICATION(S): 100000 CREAM TOPICAL at 05:59

## 2022-11-09 RX ADMIN — Medication 250 MILLIGRAM(S): at 00:36

## 2022-11-09 RX ADMIN — Medication 1 TABLET(S): at 11:12

## 2022-11-09 RX ADMIN — Medication 3 MILLILITER(S): at 05:32

## 2022-11-09 RX ADMIN — Medication 0.5 MILLIGRAM(S): at 05:32

## 2022-11-09 RX ADMIN — DORZOLAMIDE HYDROCHLORIDE 1 DROP(S): 20 SOLUTION/ DROPS OPHTHALMIC at 07:45

## 2022-11-09 RX ADMIN — APIXABAN 5 MILLIGRAM(S): 2.5 TABLET, FILM COATED ORAL at 17:42

## 2022-11-09 RX ADMIN — Medication 5 MILLIGRAM(S): at 05:59

## 2022-11-09 RX ADMIN — Medication 3 MILLILITER(S): at 23:59

## 2022-11-09 RX ADMIN — MEXILETINE HYDROCHLORIDE 200 MILLIGRAM(S): 150 CAPSULE ORAL at 07:46

## 2022-11-09 RX ADMIN — HUMAN INSULIN 50 UNIT(S): 100 INJECTION, SUSPENSION SUBCUTANEOUS at 05:58

## 2022-11-09 RX ADMIN — Medication 0.5 MILLIGRAM(S): at 19:19

## 2022-11-09 RX ADMIN — Medication 1 APPLICATION(S): at 11:14

## 2022-11-09 RX ADMIN — Medication 25 GRAM(S): at 03:51

## 2022-11-09 RX ADMIN — Medication 4 MILLILITER(S): at 05:32

## 2022-11-09 RX ADMIN — FLUCONAZOLE 150 MILLIGRAM(S): 150 TABLET ORAL at 21:08

## 2022-11-09 RX ADMIN — Medication 8 MILLIGRAM(S): at 11:43

## 2022-11-09 RX ADMIN — Medication 3 MILLILITER(S): at 11:31

## 2022-11-09 RX ADMIN — Medication 1 APPLICATION(S): at 07:46

## 2022-11-09 RX ADMIN — Medication 500 MILLIGRAM(S): at 11:17

## 2022-11-09 RX ADMIN — PANTOPRAZOLE SODIUM 40 MILLIGRAM(S): 20 TABLET, DELAYED RELEASE ORAL at 11:12

## 2022-11-09 RX ADMIN — Medication 5 MILLIGRAM(S): at 13:17

## 2022-11-09 RX ADMIN — MEXILETINE HYDROCHLORIDE 200 MILLIGRAM(S): 150 CAPSULE ORAL at 21:46

## 2022-11-09 RX ADMIN — APIXABAN 5 MILLIGRAM(S): 2.5 TABLET, FILM COATED ORAL at 06:20

## 2022-11-09 RX ADMIN — Medication 250 MILLIGRAM(S): at 23:28

## 2022-11-09 RX ADMIN — MAGNESIUM OXIDE 400 MG ORAL TABLET 400 MILLIGRAM(S): 241.3 TABLET ORAL at 06:00

## 2022-11-09 RX ADMIN — CHLORHEXIDINE GLUCONATE 1 APPLICATION(S): 213 SOLUTION TOPICAL at 06:05

## 2022-11-09 RX ADMIN — Medication 1 DROP(S): at 05:59

## 2022-11-09 RX ADMIN — Medication 50 MILLIGRAM(S): at 13:18

## 2022-11-09 RX ADMIN — Medication 4 MILLILITER(S): at 19:19

## 2022-11-09 RX ADMIN — Medication 250 MILLIGRAM(S): at 09:37

## 2022-11-09 RX ADMIN — NYSTATIN CREAM 1 APPLICATION(S): 100000 CREAM TOPICAL at 17:52

## 2022-11-09 RX ADMIN — Medication 12.5 MILLIGRAM(S): at 17:42

## 2022-11-09 RX ADMIN — Medication 50 MILLIGRAM(S): at 06:00

## 2022-11-09 RX ADMIN — MAGNESIUM OXIDE 400 MG ORAL TABLET 400 MILLIGRAM(S): 241.3 TABLET ORAL at 21:46

## 2022-11-09 RX ADMIN — Medication 3 MILLILITER(S): at 19:19

## 2022-11-09 RX ADMIN — MEXILETINE HYDROCHLORIDE 200 MILLIGRAM(S): 150 CAPSULE ORAL at 13:17

## 2022-11-09 RX ADMIN — HUMAN INSULIN 8 UNIT(S): 100 INJECTION, SUSPENSION SUBCUTANEOUS at 00:43

## 2022-11-09 RX ADMIN — Medication 50 MILLIGRAM(S): at 21:46

## 2022-11-09 RX ADMIN — HUMAN INSULIN 8 UNIT(S): 100 INJECTION, SUSPENSION SUBCUTANEOUS at 23:22

## 2022-11-09 RX ADMIN — Medication 8 MILLIGRAM(S): at 06:01

## 2022-11-09 RX ADMIN — SODIUM CHLORIDE 10 MILLILITER(S): 9 INJECTION INTRAMUSCULAR; INTRAVENOUS; SUBCUTANEOUS at 17:44

## 2022-11-09 RX ADMIN — Medication 12.5 MILLIGRAM(S): at 06:00

## 2022-11-09 RX ADMIN — MAGNESIUM OXIDE 400 MG ORAL TABLET 400 MILLIGRAM(S): 241.3 TABLET ORAL at 13:18

## 2022-11-09 RX ADMIN — Medication 5 MILLIGRAM(S): at 21:46

## 2022-11-09 NOTE — PROGRESS NOTE ADULT - SUBJECTIVE AND OBJECTIVE BOX
Patient seen and examined at the bedside.    Remained critically ill on continuous ICU monitoring.    OBJECTIVE:  ICU Vital Signs Last 24 Hrs  T(C): 36.8 (09 Nov 2022 04:00), Max: 36.9 (09 Nov 2022 00:00)  T(F): 98.3 (09 Nov 2022 04:00), Max: 98.5 (09 Nov 2022 00:00)  HR: 72 (09 Nov 2022 10:17) (65 - 95)  BP: 117/58 (09 Nov 2022 10:17) (99/47 - 166/71)  BP(mean): 83 (09 Nov 2022 10:17) (68 - 111)  RR: 21 (09 Nov 2022 10:17) (16 - 45)  SpO2: 98% (09 Nov 2022 10:17) (95% - 100%)    O2 Parameters below as of 09 Nov 2022 10:17  Patient On (Oxygen Delivery Method): tracheostomy collar  O2 Concentration (%): 40    Physical Exam:   General: Trach collar, NAD  Neurology: Nonfocal, responds to commands. Moves all extremities  ENT/Neck: + trach c/d/i, neck supple, trachea midline   Respiratory: coarse BS b/l, rhonchi throughout, minimal secretions present  CV: S1S2, no murmurs        [x] Sinus Rhythm   Abdominal: Soft, NT, ND, colostomy in place (stoma intact)  Extremities: nonpitting edema, improved. Right elbow wound vac c/d/i. warm and well-perfused.  Skin: Dry dressing over right heel. No cyanosis      Assessment:  71F PMH DM, COPD, chronic adrenal insufficiency on Prednisone, history of colorectal cancer s/p resection (colostomy bag), Hx of CAD, chronic AF on Eliquis, and tracheomalacia s/p multiple intubations, recent dx of R foot OM s/p debridement on antibiotics and presented to ED at [facility] this morning with epigastric pain, belching and central chest pain. Found on CTA to have type A aortic dissection and transferred to [facility] for surgical evaluation by Dr. Cabrera.     Ascending aortic dissection s/p type A dissection repair and Biobentall on 9/7   Respiratory failure s/p Trach 10/10/22  Hypovolemia   Post op respiratory failure   Acute blood loss anemia  Stress hyperglycemia   Leukocytosis   RIJ thrombus  MRSA PNA    Plan:   ***Neuro***  [x] Nonfocal  follows commands, continue to monitor  PT/OT progress as tolerated, ambulated with physical therapy  OOBTC    ***Cardiovascular***  Limited TTE on 10/1: EF 69%, Hyperdynamic left ventricular systolic function. There is hypokinesis of the mid-base inferior wall. Nml RV fxn.   CT chest on 9/28: No CT evidence of pulmonary embolism. Status post repair of ascending aortic dissection with a stable dissection flap originating from the aortic arch and extending into the infrarenal abdominal aorta,  B/l UE duplex on 10/5: As before, there is an occluded RIGHT IJ vein with thrombosis extending to brachiocephalic vein. Superficial thrombophlebitis of the LEFT cephalic vein.   Invasive hemodynamic monitoring, assess perfusion indices   SR / Hct 33.4/ Lactate 1.5  Continuous reassessment of hemodynamics   Hydralazine PO for BP management   Rate control with Lopressor and mexiletine   WVAC on rt elbow changed 11/4  [x] AC Therapy with Eliquis 5 BID for Afib and IJ thrombus    ***Pulmonary***  Trach collar 10L/40% since 10/25, consult with Dr. Cabrera about decannulation tomorrow?  Respiratory failure s/p Trach #8 portex  10/10/22, per ENT inner cannula needs to be changed daily, trach sutures d/c'ed by ENT 10/17 , 40% trach collar for 2 days  Titration of FiO2, follow SpO2, CXR, blood gases   Bronched 10/13  Continue duonebs  Suction q2hrly as needed   Aggressive Pulmonary toilet  Downsized to 7 cuffless trach 11/3     ***GI***  [x] Diet: TFs Glucerna 1.2 @ goal 65ml/hr, tolerating without issues (when glucerna 1.5 back in stock will switch and decrease goal rate to 55ml/hr)  [x] Protonix    Reglan for gut motility     ***Renal***  Continue to monitor I/Os, BUN/Creatinine.   Replete lytes PRN    ***ID***  SCx on 10/4+Methicillin resistant Staphylococcus aureus, c/w IVPB Vancomycin, (plan for 6 week course in setting of valve surgery, 11/26 end date)  9/27 blood culture Neela Glabarata, on flucanazole (lifelong suppression)  BCx 10/13 NGTD, BC 10/21 and SCx NGTD  R elbow wound, S/p IR for elbow drainage 10/13 + Few Proteus mirabilis ESBL, Meropenem 7 day trial completed 10/19-> reordered 10/20 for reaccumulation of elbow bursitis-  10/22 plastics debrided R elbow eschar to subc tissue, ~8cc fluid aspirated and sent for culture. Wet to dry dressing changed 2x daily. PT changed vac yesterday  Joint Fluid Cx 10/22 - p. mirabilis  Meropenem for 8 day course (until 10/28),started in setting of elbow collection 10/22,Course completed.  cont vanco (until 11/26) and diflucan (lifelong)  needs to be on isolation until vanco complete and then re culture 3 days later and if clear can be off iso    ***Endocrine***  [x]  DM : HbA1c 9.4%                - [x] ISS  [x] NPH              - Need tight glycemic control to prevent wound infection.  Endocrine following, appreciate recommendations    Patient requires continuous monitoring with bedside rhythm monitoring, pulse oximetry monitoring, and continuous central venous and arterial pressure monitoring; and intermittent blood gas analysis. Care plan discussed with the ICU care team. Patient remained critical, at risk for life threatening decompensation.    I have spent 35 minutes providing critical care management to this patient.    I, KIANA Powell, personally performed the services described in this documentation. all medical record entries made by the scribe were at my direction and in my presence. I have reviewed the chart and agree that the record reflects my personal performance and is accurate and complete  Electronically signed: KIANA Powell

## 2022-11-09 NOTE — PROGRESS NOTE ADULT - ASSESSMENT

## 2022-11-09 NOTE — PROGRESS NOTE ADULT - TIME BILLING
as above: awaiting floor bed--resp stable-speaking valve in place--ambulating daily  -ID f/up-resp stable--wound care f/up-TC continues- (she will always have secretions) due to TBM-s/p  trach 10/10-s/p  resp failure-s/p [id number]-VVEG-pg ABX-stable CV status-re- VEST rx in use  multifactorial dyspnea-resp failure-severe persistent asthma, TBM s/p tracheoplasty, s/p Aortic aneurysm repair, Bronchitis (proteus)-O2-keep 90%;TC  severe persistent asthma--medrol 8mg, singulair 10, duoneb q 6, budes .5 bid, tezspire 8/29-was due 9/29-next upon DC  TBM-s/p tracheoplasty--accapella, vest rx, mucomyst here 2 cc 10% q 8 (secretions)  s/p Aortic aneurysm repair--as per CTS staff care  AF-on eliquis rx               CV-improved hydralazine/lopressor/mexilitine   DVT R-IJ-on heparin rx  *****ID-s/p proteus bronchitis-s/p meropenum changed to ceftriaxone and back to meropenem/vanco- off of ABX as of 9/19--restart of ABX 9/27-meropenem/vanco-NOW on meropenem and vanco for MRSE sepsis (11/15 end date for vanco), plastics/ortho for elbow-proteus (?wash out)-vac in place-suppressive rx-bact/fluconazole  PT-OOB as able                             GI-TF as able--f/up LFTs-normal       ****ORTHO f/up--? additional wash out of elbow wound?; wound care f/up  **VC dysfunction--aspiration precautions-s/p trach 10/10-ENT f/up s/p laryngoscopy-? decannulation    Heme onc f/up colon ca  prog--fair                PT-to continue-more OOB     DC planning    Eulalio Allison MD-Pulmonary   [phone]

## 2022-11-09 NOTE — CHART NOTE - NSCHARTNOTEFT_GEN_A_CORE
Nutrition Follow Up Note  Patient seen for: malnutrition follow-up, transfer to CICU    Hospital course per chart: 73yo female with PMH of DM, COPD, chronic adrenal insufficiency, history of colorectal cancer s/p resection (colostomy bag), hx of CAD, chronic AF, and tracheomalacia s/p multiple intubations, with recent diagnosis PTA of R foot OM s/p debridement. Pt presented to ED at [facility] with epigastric pain, belching, and central chest pain. Found to have type A aortic dissection and transferred to [facility] for surgical evaluation. Admitted . Now s/p type A dissection repair and Biobentall on . Pt with respiratory failure s/p trach 10/10/2022. On trach collar since 10/25. Downsized to 7 cuffless trach on 11/3. Chart reviewed, events noted. Transferred to CICU this morning (), awaiting floor bed.     Source: [x] Patient (limited ability to participate in interview in setting of TC, however able to nod head yes/shake head no to RD questions)   [x] EMR        [] RN        [] Family at bedside       [x] Other: medical team     Diet Order:   Diet, NPO with Tube Feed:   Tube Feeding Modality: Nasogastric  Glucerna 1.2 [place] (GLUCERNARTH)  Total Volume for 24 Hours (mL): 1560  Continuous  Starting Tube Feed Rate {mL per Hour}: 65  Until Goal Tube Feed Rate (mL per Hour): 65  Tube Feed Duration (in Hours): 24  Tube Feed Start Time: 08:20  No Carb Prosource TF     Qty per Day:  1 (10-25-22)    EN Order Provides:    - Total volume: 1560 ml    - Kcal: 1872 (28 kcal/kg based on dosing weight 67 kg)    - Gm Protein: 94 (1.4 g/kg based on dosing weight 67 kg)    - mL free water: 1256  Protein Modular(s) Provides: 40 kcal, 11 gm protein  Current Pump Rate: 65 ml/hr (observed running at bedside)  EN provision: 92% EN volume goal provided in past 7 days    GI: patient denies nausea/vomiting, +colostomy  Bowel Regimen? [] Yes   [x] No  Ostomy Output: none documented x 24 hours  Will continue to monitor GI status as able    Weights:   Daily Weight in k.9 (-08), Weight in k.1 (), Weight in k.6 (), Weight in k.5 ()   - Weight fluctuations noted, likely in setting of fluid shifts vs. bed scale discrepancies  - Will continue to monitor and trend weights as able    MEDICATIONS  (STANDING):  ascorbic acid  fluconAZOLE IVPB  hydrALAZINE  insulin lispro (ADMELOG) corrective regimen sliding scale  insulin NPH human recombinant  insulin NPH human recombinant  magnesium oxide  methylPREDNISolone  metoprolol tartrate  mexiletine  multivitamin  pantoprazole  Injectable  sodium chloride 0.9%.  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  Edema: none per flowsheets    Estimated needs based on UBW 62.3 kg with consideration for trach collar, stage IV pressure injury, and malnutrition   Estimated Energy Needs (30-35 kcal/kg): 5383-7895 kcal/day  Estimated Protein Needs (1.6-2 g/kg): 100-125 g/day   Defer fluids to team    Previous Nutrition Diagnosis: Moderate Malnutrition  Nutrition Diagnosis is: [x] ongoing  [] resolved [] not applicable   Being addressed with enteral nutrition + protein modulars     New Nutrition Diagnosis: none at this time    Nutrition Care Plan:  [x] In Progress  [] Achieved  [] Not applicable    Nutrition Interventions:     Education Provided:       [] Yes:  [x] No: inappropriate at this time as patient with TC, on EN       Recommendations:    1) Continue current EN regimen: Glucerna 1.2 at 65 ml/hr x 24 hours with No Carb Prosource TF 1x/day   - Defer free water flushes to team  - RD remains available to adjust EN regimen as able/needed  2) Continue multivitamin and vitamin C as able to aid in wound healing    Monitoring and Evaluation:   Continue to monitor nutritional intake, tolerance to diet prescription, weights, labs, skin integrity      RD remains available upon request and will follow up per protocol.  Lakshmi Kennedy MS RD CDN [facility] Pager #[phone]

## 2022-11-09 NOTE — PROGRESS NOTE ADULT - NSPROGADDITIONALINFOA_GEN_ALL_CORE
-Plan discussed with pt/team.  Contact info: [phone] (24/7). pager [phone]  [email or web address] password [email or web address]  Teams  Spent 30 minutes assessing pt/labs/meds and discussing plan of care with primary team  Adjusting insulin  Discharge plan  Follow up care

## 2022-11-09 NOTE — PROGRESS NOTE ADULT - SUBJECTIVE AND OBJECTIVE BOX
CHIEF COMPLAINT: f/up sob, chronic resp failure, TBM, severe persistent asthma, VC dysfunction, Type A aortic dissection s/p repair w/modified "Bentall procedure and hemiarch replacement 9/6-trached--on TC-sleepy this am after transfer CCU    Interval Events: awaiting floor bed    REVIEW OF SYSTEMS:  Constitutional: No fevers or chills. No weight loss. No fatigue or generalized malaise.  Eyes: No itching or discharge from the eyes  ENT: No ear pain. No ear discharge. No nasal congestion. No post nasal drip. No epistaxis. No throat pain. No sore throat. No difficulty swallowing.   CV: No chest pain. No palpitations. No lightheadedness or dizziness.   Resp: No dyspnea at rest. No dyspnea on exertion. No orthopnea. No wheezing. No cough. No stridor. No sputum production. No chest pain with respiration.  GI: No nausea. No vomiting. No diarrhea.  MSK: No joint pain or pain in any extremities  Integumentary: No skin lesions. No pedal edema.  Neurological: No gross motor weakness. No sensory changes.  [ ] All other systems negative  [+ ] Unable to assess ROS because _sleepy_______    OBJECTIVE:  ICU Vital Signs Last 24 Hrs  T(C): 36.9 (09 Nov 2022 00:00), Max: 36.9 (09 Nov 2022 00:00)  T(F): 98.5 (09 Nov 2022 00:00), Max: 98.5 (09 Nov 2022 00:00)  HR: 76 (09 Nov 2022 03:00) (65 - 95)  BP: 137/95 (09 Nov 2022 03:00) (99/47 - 148/63)  BP(mean): 111 (09 Nov 2022 03:00) (68 - 111)  ABP: --  ABP(mean): --  RR: 31 (09 Nov 2022 03:00) (22 - 45)  SpO2: 97% (09 Nov 2022 03:00) (93% - 100%)    O2 Parameters below as of 09 Nov 2022 02:35  Patient On (Oxygen Delivery Method): tracheostomy collar  O2 Flow (L/min): 10  O2 Concentration (%): 40          11-07 @ 07:01  -  11-08 @ 07:00  --------------------------------------------------------  IN: 2375 mL / OUT: 1270 mL / NET: 1105 mL    11-08 @ 07:01  -  11-09 @ 04:50  --------------------------------------------------------  IN: 2355 mL / OUT: 675 mL / NET: 1680 mL      CAPILLARY BLOOD GLUCOSE      POCT Blood Glucose.: 88 mg/dL (08 Nov 2022 23:58)      PHYSICAL EXAM: NAD in bed on TC  General: Awake, alert, oriented X 3.   HEENT: Atraumatic, normocephalic.                 Mallampatti Grade 3                No nasal congestion.                No tonsillar or pharyngeal exudates.  Lymph Nodes: No palpable lymphadenopathy  Neck: No JVD. No carotid bruit.   Respiratory: abnormal chest expansion                         Normal percussion                         Normal and equal air entry                         No wheeze, rhonchi or rales.  Cardiovascular: S1 S2 normal. No murmurs, rubs or gallops.   Abdomen: Soft, non-tender, non-distended. No organomegaly. Normoactive bowel sounds.  Extremities: Warm to touch. Peripheral pulse palpable. No pedal edema.   Skin: No rashes or skin lesions  Neurological: Motor and sensory examination equal and normal in all four extremities.  Psychiatry: Appropriate mood and affect.    HOSPITAL MEDICATIONS:  MEDICATIONS  (STANDING):  acetylcysteine 10%  Inhalation 4 milliLiter(s) Inhalation every 12 hours  albuterol/ipratropium for Nebulization 3 milliLiter(s) Nebulizer every 6 hours  apixaban 5 milliGRAM(s) Oral every 12 hours  ascorbic acid 500 milliGRAM(s) Oral daily  buDESOnide    Inhalation Suspension 0.5 milliGRAM(s) Inhalation every 12 hours  chlorhexidine 2% Cloths 1 Application(s) Topical <User Schedule>  collagenase Ointment 1 Application(s) Topical daily  diphenhydramine 2%/zinc acetate 0.1% Cream 1 Application(s) Topical every 8 hours  dorzolamide 2% Ophthalmic Solution 1 Drop(s) Right EYE three times a day  fluconAZOLE IVPB 600 milliGRAM(s) IV Intermittent every 24 hours  hydrALAZINE 50 milliGRAM(s) Oral every 8 hours  insulin lispro (ADMELOG) corrective regimen sliding scale   SubCutaneous every 6 hours  insulin NPH human recombinant 50 Unit(s) SubCutaneous <User Schedule>  insulin NPH human recombinant 8 Unit(s) SubCutaneous <User Schedule>  magnesium oxide 400 milliGRAM(s) Oral every 8 hours  methylPREDNISolone 8 milliGRAM(s) Oral daily  metoclopramide Injectable 5 milliGRAM(s) IV Push every 8 hours  metoprolol tartrate 12.5 milliGRAM(s) Oral every 12 hours  mexiletine 200 milliGRAM(s) Oral every 8 hours  multivitamin 1 Tablet(s) Oral daily  nystatin Powder 1 Application(s) Topical two times a day  pantoprazole  Injectable 40 milliGRAM(s) IV Push daily  sodium chloride 0.9%. 1000 milliLiter(s) (10 mL/Hr) IV Continuous <Continuous>  timolol 0.5% Solution 1 Drop(s) Right EYE two times a day  vancomycin  IVPB 750 milliGRAM(s) IV Intermittent every 12 hours  vancomycin  IVPB        MEDICATIONS  (PRN):  acetaminophen    Suspension .. 650 milliGRAM(s) Oral every 6 hours PRN Temp greater or equal to 38C (100.4F), Mild Pain (1 - 3)      LABS:                        9.2    13.63 )-----------( 280      ( 09 Nov 2022 00:49 )             30.2     11-09    136  |  99  |  16  ----------------------------<  88  4.1   |  27  |  0.42<L>    Ca    9.2      09 Nov 2022 00:49  Phos  3.6     11-09  Mg     1.8     11-09    TPro  6.7  /  Alb  3.2<L>  /  TBili  0.2  /  DBili  x   /  AST  15  /  ALT  14  /  AlkPhos  116  11-09              MICROBIOLOGY:     RADIOLOGY:  [ ] Reviewed and interpreted by me    Point of Care Ultrasound Findings:    PFT:    EKG:

## 2022-11-09 NOTE — PROGRESS NOTE ADULT - PROBLEM SELECTOR PLAN 1
-test BG q1h until tomorrow at 6am for safety then q6h if NPO/TF   -C/w NPH doses as follows:   12am: c/w NPH 8 units.    6am: C/w NPH 50 units. Give 50% of dose if BG less than 120mg/dl. DO NOT HOLD IF RECEIVING TF  12pm: No NPH insulin     6pm: No NPH dose for now.   -If TFs off after NPH is given please start D5 infusion at TF rate to prevent rebound hypoglycemia.   -C/w Admelog moderate correction scale q6h for now  Discharge plan:  - Likely to discharge patient on basal/bolus insulin. Final regimen pending clinical course.  - Recommend routine outpatient ophthalmology, podiatry  - Can f/u with endocrinologist Dr. Saavedra.  - Make sure pt has Rx for all DM supplies and insulin/ DM meds.  -Based on pt's clinical condition and mental status at this time she is not able to independently manage her DM. Will evaluate pt's ability to manage DM at time of discharge. If unable> pt will need family/ care giver (s) to manage DM care at home  -Will need rehab.

## 2022-11-09 NOTE — PROGRESS NOTE ADULT - ASSESSMENT
72 F w/h/o uncontrolled T2DM (A1C 9.4%) on basal/bolus insulin PTA. Unknown DM complications. Also COPD, secondary adrenal Insufficiency on chronic steroids, colorectal cancer s/p resection (colostomy bag), Chronic A fib on Eliquis, and tracheomalacia and multiple intubations, recent dx of OM presented to ED at [facility] with epigastric pain, belching and central chest pain. Found on CTA to have type A aortic dissection and transferred to [facility] for surgical evaluation by Dr. Cabrera. Now s/p aortic dissection repair on 9/07/22, sepsis, and now s/p trach 10/10 and more recently s/p R elbow eschar debridement 10/22 > Wound VAC 10/24.  Endocrine consulted for assistance with uncontrolled DM/steroids for AI. Pt in CICU unit now but still CTU pt. Noted event in last 24 hours > pt received extra dose of NPH 50units at 6pm yesterday and this am (ordered for 6am only) with BG <100s but not hypoglycemic likely due to steroid effect since pt is on Prednisone dose 8mg for AI. Tolerating TF @ goal rate. Will test BG q1h and start D10 infusion to keep BG >100 until NPH effect wears off. BG goal 100 to 180s.

## 2022-11-10 LAB
ALBUMIN SERPL ELPH-MCNC: 3.3 G/DL — SIGNIFICANT CHANGE UP (ref 3.3–5)
ALP SERPL-CCNC: 115 U/L — SIGNIFICANT CHANGE UP (ref 40–120)
ALT FLD-CCNC: 15 U/L — SIGNIFICANT CHANGE UP (ref 10–45)
ANION GAP SERPL CALC-SCNC: 8 MMOL/L — SIGNIFICANT CHANGE UP (ref 5–17)
AST SERPL-CCNC: 17 U/L — SIGNIFICANT CHANGE UP (ref 10–40)
BILIRUB SERPL-MCNC: 0.2 MG/DL — SIGNIFICANT CHANGE UP (ref 0.2–1.2)
BUN SERPL-MCNC: 19 MG/DL — SIGNIFICANT CHANGE UP (ref 7–23)
CALCIUM SERPL-MCNC: 9.1 MG/DL — SIGNIFICANT CHANGE UP (ref 8.4–10.5)
CHLORIDE SERPL-SCNC: 101 MMOL/L — SIGNIFICANT CHANGE UP (ref 96–108)
CO2 SERPL-SCNC: 28 MMOL/L — SIGNIFICANT CHANGE UP (ref 22–31)
CREAT SERPL-MCNC: 0.44 MG/DL — LOW (ref 0.5–1.3)
EGFR: 101 ML/MIN/1.73M2 — SIGNIFICANT CHANGE UP
GLUCOSE BLDC GLUCOMTR-MCNC: 102 MG/DL — HIGH (ref 70–99)
GLUCOSE BLDC GLUCOMTR-MCNC: 110 MG/DL — HIGH (ref 70–99)
GLUCOSE BLDC GLUCOMTR-MCNC: 140 MG/DL — HIGH (ref 70–99)
GLUCOSE SERPL-MCNC: 152 MG/DL — HIGH (ref 70–99)
HCT VFR BLD CALC: 30.6 % — LOW (ref 34.5–45)
HGB BLD-MCNC: 9.4 G/DL — LOW (ref 11.5–15.5)
MAGNESIUM SERPL-MCNC: 1.9 MG/DL — SIGNIFICANT CHANGE UP (ref 1.6–2.6)
MCHC RBC-ENTMCNC: 23.4 PG — LOW (ref 27–34)
MCHC RBC-ENTMCNC: 30.7 GM/DL — LOW (ref 32–36)
MCV RBC AUTO: 76.1 FL — LOW (ref 80–100)
NRBC # BLD: 0 /100 WBCS — SIGNIFICANT CHANGE UP (ref 0–0)
PHOSPHATE SERPL-MCNC: 3.4 MG/DL — SIGNIFICANT CHANGE UP (ref 2.5–4.5)
PLATELET # BLD AUTO: 291 K/UL — SIGNIFICANT CHANGE UP (ref 150–400)
POTASSIUM SERPL-MCNC: 4.1 MMOL/L — SIGNIFICANT CHANGE UP (ref 3.5–5.3)
POTASSIUM SERPL-SCNC: 4.1 MMOL/L — SIGNIFICANT CHANGE UP (ref 3.5–5.3)
PROT SERPL-MCNC: 6.8 G/DL — SIGNIFICANT CHANGE UP (ref 6–8.3)
RBC # BLD: 4.02 M/UL — SIGNIFICANT CHANGE UP (ref 3.8–5.2)
RBC # FLD: 20.8 % — HIGH (ref 10.3–14.5)
SODIUM SERPL-SCNC: 137 MMOL/L — SIGNIFICANT CHANGE UP (ref 135–145)
WBC # BLD: 12.7 K/UL — HIGH (ref 3.8–10.5)
WBC # FLD AUTO: 12.7 K/UL — HIGH (ref 3.8–10.5)

## 2022-11-10 PROCEDURE — 71045 X-RAY EXAM CHEST 1 VIEW: CPT | Mod: 26

## 2022-11-10 PROCEDURE — 99232 SBSQ HOSP IP/OBS MODERATE 35: CPT

## 2022-11-10 PROCEDURE — 99291 CRITICAL CARE FIRST HOUR: CPT | Mod: 24

## 2022-11-10 RX ORDER — CALAMINE AND ZINC OXIDE AND PHENOL 160; 10 MG/ML; MG/ML
1 LOTION TOPICAL EVERY 8 HOURS
Refills: 0 | Status: DISCONTINUED | OUTPATIENT
Start: 2022-11-10 | End: 2022-11-22

## 2022-11-10 RX ORDER — CHLORHEXIDINE GLUCONATE 213 G/1000ML
1 SOLUTION TOPICAL
Refills: 0 | Status: DISCONTINUED | OUTPATIENT
Start: 2022-11-10 | End: 2022-11-22

## 2022-11-10 RX ORDER — MAGNESIUM SULFATE 500 MG/ML
2 VIAL (ML) INJECTION ONCE
Refills: 0 | Status: COMPLETED | OUTPATIENT
Start: 2022-11-10 | End: 2022-11-10

## 2022-11-10 RX ADMIN — CHLORHEXIDINE GLUCONATE 1 APPLICATION(S): 213 SOLUTION TOPICAL at 05:07

## 2022-11-10 RX ADMIN — MEXILETINE HYDROCHLORIDE 200 MILLIGRAM(S): 150 CAPSULE ORAL at 14:49

## 2022-11-10 RX ADMIN — NYSTATIN CREAM 1 APPLICATION(S): 100000 CREAM TOPICAL at 05:10

## 2022-11-10 RX ADMIN — FLUCONAZOLE 150 MILLIGRAM(S): 150 TABLET ORAL at 20:32

## 2022-11-10 RX ADMIN — Medication 50 MILLIGRAM(S): at 14:49

## 2022-11-10 RX ADMIN — Medication 3 MILLILITER(S): at 23:51

## 2022-11-10 RX ADMIN — Medication 650 MILLIGRAM(S): at 05:39

## 2022-11-10 RX ADMIN — HUMAN INSULIN 50 UNIT(S): 100 INJECTION, SUSPENSION SUBCUTANEOUS at 05:41

## 2022-11-10 RX ADMIN — CALAMINE AND ZINC OXIDE AND PHENOL 1 APPLICATION(S): 160; 10 LOTION TOPICAL at 22:30

## 2022-11-10 RX ADMIN — Medication 4 MILLILITER(S): at 06:13

## 2022-11-10 RX ADMIN — Medication 12.5 MILLIGRAM(S): at 17:26

## 2022-11-10 RX ADMIN — MAGNESIUM OXIDE 400 MG ORAL TABLET 400 MILLIGRAM(S): 241.3 TABLET ORAL at 05:10

## 2022-11-10 RX ADMIN — Medication 4 MILLILITER(S): at 17:34

## 2022-11-10 RX ADMIN — PANTOPRAZOLE SODIUM 40 MILLIGRAM(S): 20 TABLET, DELAYED RELEASE ORAL at 11:19

## 2022-11-10 RX ADMIN — Medication 650 MILLIGRAM(S): at 20:00

## 2022-11-10 RX ADMIN — Medication 0.5 MILLIGRAM(S): at 06:12

## 2022-11-10 RX ADMIN — Medication 3 MILLILITER(S): at 06:11

## 2022-11-10 RX ADMIN — Medication 5 MILLIGRAM(S): at 21:49

## 2022-11-10 RX ADMIN — MEXILETINE HYDROCHLORIDE 200 MILLIGRAM(S): 150 CAPSULE ORAL at 05:08

## 2022-11-10 RX ADMIN — APIXABAN 5 MILLIGRAM(S): 2.5 TABLET, FILM COATED ORAL at 17:25

## 2022-11-10 RX ADMIN — Medication 8 MILLIGRAM(S): at 06:20

## 2022-11-10 RX ADMIN — MAGNESIUM OXIDE 400 MG ORAL TABLET 400 MILLIGRAM(S): 241.3 TABLET ORAL at 21:49

## 2022-11-10 RX ADMIN — Medication 1 APPLICATION(S): at 21:49

## 2022-11-10 RX ADMIN — Medication 3 MILLILITER(S): at 11:41

## 2022-11-10 RX ADMIN — Medication 1 APPLICATION(S): at 12:08

## 2022-11-10 RX ADMIN — Medication 650 MILLIGRAM(S): at 20:30

## 2022-11-10 RX ADMIN — Medication 250 MILLIGRAM(S): at 21:50

## 2022-11-10 RX ADMIN — Medication 650 MILLIGRAM(S): at 05:09

## 2022-11-10 RX ADMIN — Medication 50 MILLIGRAM(S): at 21:49

## 2022-11-10 RX ADMIN — Medication 25 GRAM(S): at 02:57

## 2022-11-10 RX ADMIN — MAGNESIUM OXIDE 400 MG ORAL TABLET 400 MILLIGRAM(S): 241.3 TABLET ORAL at 14:48

## 2022-11-10 RX ADMIN — Medication 0.5 MILLIGRAM(S): at 17:35

## 2022-11-10 RX ADMIN — Medication 3 MILLILITER(S): at 17:32

## 2022-11-10 RX ADMIN — Medication 50 MILLIGRAM(S): at 05:08

## 2022-11-10 RX ADMIN — Medication 500 MILLIGRAM(S): at 11:20

## 2022-11-10 RX ADMIN — Medication 250 MILLIGRAM(S): at 10:50

## 2022-11-10 RX ADMIN — MEXILETINE HYDROCHLORIDE 200 MILLIGRAM(S): 150 CAPSULE ORAL at 21:49

## 2022-11-10 RX ADMIN — NYSTATIN CREAM 1 APPLICATION(S): 100000 CREAM TOPICAL at 17:26

## 2022-11-10 RX ADMIN — Medication 12.5 MILLIGRAM(S): at 05:08

## 2022-11-10 RX ADMIN — Medication 5 MILLIGRAM(S): at 05:07

## 2022-11-10 RX ADMIN — Medication 1 TABLET(S): at 11:19

## 2022-11-10 RX ADMIN — APIXABAN 5 MILLIGRAM(S): 2.5 TABLET, FILM COATED ORAL at 05:08

## 2022-11-10 NOTE — PROGRESS NOTE ADULT - SUBJECTIVE AND OBJECTIVE BOX
Patient seen and examined at the bedside.    Remained critically ill on continuous ICU monitoring.    24 Hour events:  - Changed daily X-rays to 2x a week  - No more daily blood work  - Discussion for possible deccanulation    OBJECTIVE:  Vital Signs Last 24 Hrs  T(C): 36.2 (10 Nov 2022 08:00), Max: 37 (09 Nov 2022 19:00)  T(F): 97.1 (10 Nov 2022 08:00), Max: 98.6 (09 Nov 2022 19:00)  HR: 68 (10 Nov 2022 10:00) (64 - 776)  BP: 140/66 (10 Nov 2022 10:00) (120/56 - 162/69)  BP(mean): 88 (10 Nov 2022 10:00) (78 - 101)  RR: 24 (10 Nov 2022 10:00) (19 - 24)  SpO2: 100% (10 Nov 2022 10:00) (95% - 100%)    Parameters below as of 10 Nov 2022 08:34  Patient On (Oxygen Delivery Method): tracheostomy collar    O2 Concentration (%): 40      Physical Exam:   General: Trach collar, NAD  Neurology: Nonfocal, responds to commands. Moves all extremities  ENT/Neck: + trach c/d/i, neck supple, trachea midline   Respiratory: coarse BS b/l, rhonchi throughout, minimal secretions present  CV: S1S2, no murmurs        [x] Sinus Rhythm   Abdominal: Soft, NT, ND, colostomy in place (stoma intact)  Extremities: nonpitting edema, improved. Right elbow wound vac c/d/i. warm and well-perfused.  Skin: Dry dressing over right heel. No cyanosis                         Assessment:  71F PMH DM, COPD, chronic adrenal insufficiency on Prednisone, history of colorectal cancer s/p resection (colostomy bag), Hx of CAD, chronic AF on Eliquis, and tracheomalacia s/p multiple intubations, recent dx of R foot OM s/p debridement on antibiotics and presented to ED at [facility] this morning with epigastric pain, belching and central chest pain. Found on CTA to have type A aortic dissection and transferred to [facility] for surgical evaluation by Dr. Cabrera.     Ascending aortic dissection s/p type A dissection repair and Biobentall on 9/7   Respiratory failure s/p Trach 10/10/22  Hypovolemia   Post op respiratory failure   Acute blood loss anemia  Stress hyperglycemia   Leukocytosis   RIJ thrombus  MRSA PNA        Plan:   ***Neuro***  [x] Nonfocal  follows commands, continue to monitor  PT/OT progress as tolerated, ambulated with physical therapy  OOBTC      ***Cardiovascular***  Limited TTE on 10/1: EF 69%, Hyperdynamic left ventricular systolic function. There is hypokinesis of the mid-base inferior wall. Nml RV fxn.   CT chest on 9/28: No CT evidence of pulmonary embolism. Status post repair of ascending aortic dissection with a stable dissection flap originating from the aortic arch and extending into the infrarenal abdominal aorta,  B/l UE duplex on 10/5: As before, there is an occluded RIGHT IJ vein with thrombosis extending to brachiocephalic vein. Superficial thrombophlebitis of the LEFT cephalic vein.   Invasive hemodynamic monitoring, assess perfusion indices   SR / Hct 30.6/ Lactate 1.5  Hydralazine PO for BP management   Rate control with Lopressor and mexiletine   WVAC on rt elbow changed 11/4  [x] AC Therapy with Eliquis 5 BID for Afib and IJ thrombus  Stopped daily blood work  Serial EKG and cardiac enzymes     ***Pulmonary***  Trach collar 10L/40% since 10/25, consult with Dr. Cabrera about decannulation?  Respiratory failure s/p Trach #8 portex  10/10/22, per ENT inner cannula needs to be changed daily, trach sutures d/c'ed by ENT 10/17 , 40% trach collar for 2 days  Daily X-rays changed to twice a week   Titration of FiO2, follow SpO2, CXR, blood gases   Bronched 10/13  Continue duonebs  Suction q2hrly as needed   Aggressive Pulmonary toilet  Downsized to 7 cuffless trach 11/3                  ***GI***  [x] Diet: TFs Glucerna 1.2 @ goal 65ml/hr, tolerating without issues (when glucerna 1.5 back in stock will switch and decrease goal rate to 55ml/hr)  [x] Protonix    Reglan for gut motility       ***Renal***  Continue to monitor I/Os, BUN/Creatinine.   Replete lytes PRN      ***ID***  SCx on 10/4+Methicillin resistant Staphylococcus aureus, c/w IVPB Vancomycin, (plan for 6 week course in setting of valve surgery, 11/26 end date)  9/27 blood culture Neela Glabarata, on flucanazole (lifelong suppression)  BCx 10/13 NGTD, BC 10/21 and SCx NGTD  R elbow wound, S/p IR for elbow drainage 10/13 + Few Proteus mirabilis ESBL, Meropenem 7 day trial completed 10/19-> reordered 10/20 for reaccumulation of elbow bursitis-  10/22 plastics debrided R elbow eschar to subc tissue, ~8cc fluid aspirated and sent for culture. Wet to dry dressing changed 2x daily. PT changed vac yesterday  Joint Fluid Cx 10/22 - p. mirabilis  Meropenem for 8 day course (until 10/28),started in setting of elbow collection 10/22,Course completed.  cont vanco and fluc  needs to be on isolation until vanco complete and then re culture 3 days later and if clear can be off iso       ***Endocrine***  [x]  DM : HbA1c 9.4%                - [x] ISS  [x] NPH              - Need tight glycemic control to prevent wound infection.  Endocrine following, appreciate recommendations          Patient requires continuous monitoring with bedside rhythm monitoring, pulse oximetry monitoring, and continuous central venous and arterial pressure monitoring; and intermittent blood gas analysis. Care plan discussed with the ICU care team.   Patient remained critical, at risk for life threatening decompensation.    I have spent 30 minutes providing critical care management to this patient.    By signing my name below, I, Kieran Plascencia, attest that this documentation has been prepared under the direction and in the presence of Gena Meyer NP   Electronically signed: Rogers Cordero, 11-10-22 @ 11:24    I, Gena Meyer NP, personally performed the services described in this documentation. all medical record entries made by the rogers were at my direction and in my presence. I have reviewed the chart and agree that the record reflects my personal performance and is accurate and complete  Electronically signed: Gena Meyer NP  Patient seen and examined at the bedside.    Remained critically ill on continuous ICU monitoring.    24 Hour events:  - Changed daily X-rays to 3x a week (t/th/sat)  - No more daily blood work, 3x a week (t/th/sat)  - Discussion for possible deccanulation to day or next few days based on secretions    OBJECTIVE:  Vital Signs Last 24 Hrs  T(C): 36.2 (10 Nov 2022 08:00), Max: 37 (09 Nov 2022 19:00)  T(F): 97.1 (10 Nov 2022 08:00), Max: 98.6 (09 Nov 2022 19:00)  HR: 68 (10 Nov 2022 10:00) (64 - 776)  BP: 140/66 (10 Nov 2022 10:00) (120/56 - 162/69)  BP(mean): 88 (10 Nov 2022 10:00) (78 - 101)  RR: 24 (10 Nov 2022 10:00) (19 - 24)  SpO2: 100% (10 Nov 2022 10:00) (95% - 100%)    Parameters below as of 10 Nov 2022 08:34  Patient On (Oxygen Delivery Method): tracheostomy collar    O2 Concentration (%): 40      Physical Exam:   General: Trach collar, NAD, Ambulates well  Neurology: Nonfocal, responds to commands. Moves all extremities  ENT/Neck: + trach c/d/i, neck supple, trachea midline   Respiratory: coarse BS b/l, rhonchi throughout, minimal secretions present  CV: S1S2, no murmurs        [x] Sinus Rhythm   Abdominal: Soft, NT, ND, colostomy in place (stoma intact)  Extremities: nonpitting edema, improved. Right elbow wound vac c/d/i. warm and well-perfused.  Skin: Dry dressing over right heel. No cyanosis                         Assessment:  71F PMH DM, COPD, chronic adrenal insufficiency on Prednisone, history of colorectal cancer s/p resection (colostomy bag), Hx of CAD, chronic AF on Eliquis, and tracheomalacia s/p multiple intubations, recent dx of R foot OM s/p debridement on antibiotics and presented to ED at [facility] this morning with epigastric pain, belching and central chest pain. Found on CTA to have type A aortic dissection and transferred to [facility] for surgical evaluation by Dr. Cabrera.     Ascending aortic dissection s/p type A dissection repair and Biobentall on 9/7   Respiratory failure s/p Trach 10/10/22  Hypovolemia   Post op respiratory failure   Acute blood loss anemia  Stress hyperglycemia   Leukocytosis   RIJ thrombus  MRSA PNA        Plan:   ***Neuro***  [x] Nonfocal  follows commands, continue to monitor  PT/OT progress as tolerated, ambulated with physical therapy  OOBTC      ***Cardiovascular***  Limited TTE on 10/1: EF 69%, Hyperdynamic left ventricular systolic function. There is hypokinesis of the mid-base inferior wall. Nml RV fxn.   CT chest on 9/28: No CT evidence of pulmonary embolism. Status post repair of ascending aortic dissection with a stable dissection flap originating from the aortic arch and extending into the infrarenal abdominal aorta,  B/l UE duplex on 10/5: As before, there is an occluded RIGHT IJ vein with thrombosis extending to brachiocephalic vein. Superficial thrombophlebitis of the LEFT cephalic vein.   Invasive hemodynamic monitoring, assess perfusion indices   SR / Hct 30.6/ Lactate 1.5  Hydralazine PO for BP management   Rate control with Lopressor and mexiletine   Wound VAC on rt elbow intact  [x] AC Therapy with Eliquis 5 BID for Afib and IJ thrombus  Stopped daily blood work (T/Th/Sat)  Serial EKG and cardiac enzymes     ***Pulmonary***  Trach collar 10L/40% since 10/25, possible decannulation this week if secretions manageable  Respiratory failure s/p Trach #8 portex  10/10/22, per ENT inner cannula needs to be changed daily, trach sutures d/c'ed by ENT 10/17 , 40% trach collar since 10/25  Daily X-rays changed to (T/Th/Sat)  Titration of FiO2, follow SpO2, CXR, blood gases   Bronched 10/13  Continue duonebs  Suction q2hrly as needed   Aggressive Pulmonary toilet  Downsized to 7 cuffless trach 11/3                  ***GI***  [x] Diet: TFs Glucerna 1.2 @ goal 65ml/hr, tolerating without issues (when glucerna 1.5 back in stock will switch and decrease goal rate to 55ml/hr)  [x] Protonix    Reglan for gut motility       ***Renal***  Continue to monitor I/Os, BUN/Creatinine.   Replete lytes PRN      ***ID***  SCx on 10/4+Methicillin resistant Staphylococcus aureus, c/w IVPB Vancomycin, (plan for 6 week course in setting of valve surgery, 11/26 end date)  9/27 blood culture Neela Glabarata, on flucanazole (lifelong suppression)  BCx 10/13 NGTD, BC 10/21 and SCx NGTD  R elbow wound, S/p IR for elbow drainage 10/13 + Few Proteus mirabilis ESBL, Meropenem 7 day trial completed 10/19-> reordered 10/20 for reaccumulation of elbow bursitis-  10/22 plastics debrided R elbow eschar to subc tissue, ~8cc fluid aspirated and sent for culture. VAC changes done by PT   Joint Fluid Cx 10/22 - p. mirabilis s/p Meropenem for 8 day course  cont vanco (until 11/26 for 6 week course) and fluconazole for lifelong suppression  needs to be on isolation until vanco complete and then re culture 3 days later and if clear can be off isolation       ***Endocrine***  [x]  DM : HbA1c 9.4%                - [x] ISS  [x] NPH              - Need tight glycemic control to prevent wound infection.  Endocrine following, appreciate recommendations    Stable for transfer to SDU          Patient requires continuous monitoring with bedside rhythm monitoring, pulse oximetry monitoring, and continuous central venous and arterial pressure monitoring; and intermittent blood gas analysis. Care plan discussed with the ICU care team.   Patient remained critical, at risk for life threatening decompensation.    I have spent 30 minutes providing critical care management to this patient.    By signing my name below, I, Kieran Plascencia, attest that this documentation has been prepared under the direction and in the presence of Gena Meyer NP   Electronically signed: Rogers Cordero, 11-10-22 @ 11:24    I, Gena Meyer NP, personally performed the services described in this documentation. all medical record entries made by the rogers were at my direction and in my presence. I have reviewed the chart and agree that the record reflects my personal performance and is accurate and complete  Electronically signed: Gena Meyer NP

## 2022-11-10 NOTE — PROGRESS NOTE ADULT - SUBJECTIVE AND OBJECTIVE BOX
CHIEF COMPLAINT: f/up sob, chronic resp failure, TBM, severe persistent asthma, VC dysfunction, Type A aortic dissection s/p repair w/modified "Bentall procedure and hemiarch replacement 9/6-trached--on TC-no complaints at present    Interval Events: await decannulation    REVIEW OF SYSTEMS:  Constitutional: No fevers or chills. No weight loss. No fatigue or generalized malaise.  Eyes: No itching or discharge from the eyes  ENT: No ear pain. No ear discharge. No nasal congestion. No post nasal drip. No epistaxis. No throat pain. No sore throat. No difficulty swallowing.   CV: No chest pain. No palpitations. No lightheadedness or dizziness.   Resp: No dyspnea at rest. No dyspnea on exertion. No orthopnea. No wheezing. No cough. No stridor. No sputum production. No chest pain with respiration.  GI: No nausea. No vomiting. No diarrhea.  MSK: No joint pain or pain in any extremities  Integumentary: No skin lesions. No pedal edema.  Neurological: No gross motor weakness. No sensory changes.  [+ ] All other systems negative  [ ] Unable to assess ROS because ________    OBJECTIVE:  ICU Vital Signs Last 24 Hrs  T(C): 36.2 (10 Nov 2022 00:00), Max: 37 (09 Nov 2022 19:00)  T(F): 97.2 (10 Nov 2022 00:00), Max: 98.6 (09 Nov 2022 19:00)  HR: 73 (10 Nov 2022 04:00) (65 - 776)  BP: 133/62 (10 Nov 2022 04:00) (105/52 - 162/69)  BP(mean): 89 (10 Nov 2022 04:00) (75 - 101)  ABP: --  ABP(mean): --  RR: 22 (10 Nov 2022 04:00) (16 - 29)  SpO2: 98% (10 Nov 2022 04:00) (95% - 100%)    O2 Parameters below as of 10 Nov 2022 04:00  Patient On (Oxygen Delivery Method): tracheostomy collar    O2 Concentration (%): 40          11-08 @ 07:01  -  11-09 @ 07:00  --------------------------------------------------------  IN: 2760 mL / OUT: 875 mL / NET: 1885 mL    11-09 @ 07:01  -  11-10 @ 05:24  --------------------------------------------------------  IN: 2755 mL / OUT: 1705 mL / NET: 1050 mL      CAPILLARY BLOOD GLUCOSE      POCT Blood Glucose.: 140 mg/dL (10 Nov 2022 05:06)      PHYSICAL EXAM: NAD in bed on TC  General: Awake, alert, oriented X 3.   HEENT: Atraumatic, normocephalic.                 Mallampatti Grade 3                No nasal congestion.                No tonsillar or pharyngeal exudates.  Lymph Nodes: No palpable lymphadenopathy  Neck: No JVD. No carotid bruit.   Respiratory: abnormal chest expansion                         Normal percussion                         Normal and equal air entry                         No wheeze, rhonchi or rales.  Cardiovascular: S1 S2 normal. No murmurs, rubs or gallops.   Abdomen: Soft, non-tender, non-distended. No organomegaly. Normoactive bowel sounds.  Extremities: Warm to touch. Peripheral pulse palpable. No pedal edema. Wound vac in place  Skin: No rashes or skin lesions  Neurological: Motor and sensory examination equal and normal in all four extremities.  Psychiatry: Appropriate mood and affect.    HOSPITAL MEDICATIONS:  MEDICATIONS  (STANDING):  acetylcysteine 10%  Inhalation 4 milliLiter(s) Inhalation every 12 hours  albuterol/ipratropium for Nebulization 3 milliLiter(s) Nebulizer every 6 hours  apixaban 5 milliGRAM(s) Enteral Tube every 12 hours  ascorbic acid 500 milliGRAM(s) Oral daily  buDESOnide    Inhalation Suspension 0.5 milliGRAM(s) Inhalation every 12 hours  chlorhexidine 2% Cloths 1 Application(s) Topical <User Schedule>  collagenase Ointment 1 Application(s) Topical daily  diphenhydramine 2%/zinc acetate 0.1% Cream 1 Application(s) Topical every 8 hours  fluconAZOLE IVPB 600 milliGRAM(s) IV Intermittent every 24 hours  hydrALAZINE 50 milliGRAM(s) Oral three times a day  insulin lispro (ADMELOG) corrective regimen sliding scale   SubCutaneous every 6 hours  insulin NPH human recombinant 8 Unit(s) SubCutaneous <User Schedule>  insulin NPH human recombinant 50 Unit(s) SubCutaneous <User Schedule>  magnesium oxide 400 milliGRAM(s) Oral every 8 hours  methylPREDNISolone 8 milliGRAM(s) Oral daily  metoclopramide Injectable 5 milliGRAM(s) IV Push every 8 hours  metoprolol tartrate 12.5 milliGRAM(s) Enteral Tube two times a day  mexiletine 200 milliGRAM(s) Oral every 8 hours  multivitamin 1 Tablet(s) Oral daily  nystatin Powder 1 Application(s) Topical two times a day  pantoprazole  Injectable 40 milliGRAM(s) IV Push daily  sodium chloride 0.9%. 1000 milliLiter(s) (10 mL/Hr) IV Continuous <Continuous>  vancomycin  IVPB 750 milliGRAM(s) IV Intermittent every 12 hours  vancomycin  IVPB        MEDICATIONS  (PRN):  acetaminophen    Suspension .. 650 milliGRAM(s) Oral every 6 hours PRN Temp greater or equal to 38C (100.4F), Mild Pain (1 - 3)      LABS:                        9.4    12.70 )-----------( 291      ( 10 Nov 2022 01:52 )             30.6     11-10    137  |  101  |  19  ----------------------------<  152<H>  4.1   |  28  |  0.44<L>    Ca    9.1      10 Nov 2022 01:52  Phos  3.4     11-10  Mg     1.9     11-10    TPro  6.8  /  Alb  3.3  /  TBili  0.2  /  DBili  x   /  AST  17  /  ALT  15  /  AlkPhos  115  11-10              MICROBIOLOGY:     RADIOLOGY:  [ ] Reviewed and interpreted by me    Point of Care Ultrasound Findings:    PFT:    EKG:

## 2022-11-10 NOTE — PROGRESS NOTE ADULT - ASSESSMENT

## 2022-11-10 NOTE — PROGRESS NOTE ADULT - SUBJECTIVE AND OBJECTIVE BOX
Podiatry pager #: [phone]/ [phone]    Patient is a 72y old  Female who presents with a chief complaint of Type A aortic dissection (10 Nov 2022 11:23) podiatry consult for evaluation of bilateral foot wounds.      HPI:  71F PMH DM, COPD, Chronic Adrenal Insufficiency on Chronic prednisone, history of colorectal cancer s/p resection (colostomy bag), Hx of CAD, Chronic A fib on Eliquis, and tracheomalacia and multiple intubations, recent dx of OM presented to ED at [facility] this morning with epigastric pain, belching and central chest pain. Found on CTA to have type A aortic dissection and transferred to [facility] for surgical evaluation by Dr. Cabrera. (06 Sep 2022 16:32)      PAST MEDICAL & SURGICAL HISTORY:  Atrial fibrillation  paroxysmal, on eliquis      Diabetes  Type 2      COPD (chronic obstructive pulmonary disease)      Adrenal insufficiency  Medrol daily for over 50 years      Aortic insufficiency  moderate AR on echo 5/3/2018      Pelvic fracture      Asthma      Tracheobronchomalacia  diagnosed 2015, s/p bronchial thermoplasty 2016 (Dr Zapien); recent bronchoscopy 6/5/2018 revealed no evidence of tracheobronchomalacia in trachea or bronchial tubes      Colorectal cancer  4/2018- last treatment , chemo and radiation      Rectal bleeding      Seizure  x 1 1/7/18      DVT (deep venous thrombosis)  15-20 years ago, took coumadin      TIA (transient ischemic attack)  multiple, last 5 years ago - presents as right-sided weakness      History of partial hysterectomy  30 years ago - fibroids      H/O total knee replacement, bilateral  5 years ago      History of sinus surgery  multiple sinus surgeries      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      H/O pelvic surgery  5 years ago - s/p fracture      History of tracheomalacia  2015 - attempted tracheal stenting ([facility])- course complicated by obstruction, respiratory failure, multiple CPR attempts -  stent discontinued; 10/20/2016 Tracheobronchoplasty (Prolene Mesh) performed at [facility] by Dr Zapien      S/P bronchoscopy  6/5/2018 - [place] (Dr Zapien) no evidence of tracheobronchomalacia in trachea or bronchial tubes      Rectal bleeding  exam under anesthesia ([facility]) 2/2018          MEDICATIONS  (STANDING):  acetylcysteine 10%  Inhalation 4 milliLiter(s) Inhalation every 12 hours  albuterol/ipratropium for Nebulization 3 milliLiter(s) Nebulizer every 6 hours  apixaban 5 milliGRAM(s) Enteral Tube every 12 hours  ascorbic acid 500 milliGRAM(s) Oral daily  buDESOnide    Inhalation Suspension 0.5 milliGRAM(s) Inhalation every 12 hours  chlorhexidine 2% Cloths 1 Application(s) Topical <User Schedule>  collagenase Ointment 1 Application(s) Topical daily  diphenhydramine 2%/zinc acetate 0.1% Cream 1 Application(s) Topical every 8 hours  fluconAZOLE IVPB 600 milliGRAM(s) IV Intermittent every 24 hours  hydrALAZINE 50 milliGRAM(s) Oral three times a day  insulin lispro (ADMELOG) corrective regimen sliding scale   SubCutaneous every 6 hours  insulin NPH human recombinant 8 Unit(s) SubCutaneous <User Schedule>  insulin NPH human recombinant 50 Unit(s) SubCutaneous <User Schedule>  magnesium oxide 400 milliGRAM(s) Oral every 8 hours  methylPREDNISolone 8 milliGRAM(s) Oral daily  metoclopramide Injectable 5 milliGRAM(s) IV Push every 8 hours  metoprolol tartrate 12.5 milliGRAM(s) Enteral Tube two times a day  mexiletine 200 milliGRAM(s) Oral every 8 hours  multivitamin 1 Tablet(s) Oral daily  nystatin Powder 1 Application(s) Topical two times a day  pantoprazole  Injectable 40 milliGRAM(s) IV Push daily  vancomycin  IVPB 750 milliGRAM(s) IV Intermittent every 12 hours  vancomycin  IVPB        MEDICATIONS  (PRN):  acetaminophen    Suspension .. 650 milliGRAM(s) Oral every 6 hours PRN Temp greater or equal to 38C (100.4F), Mild Pain (1 - 3)      Allergies    aspirin (Short breath)  Avelox (Short breath; Pruritus)  cefepime (Anaphylaxis)  codeine (Short breath)  Dilaudid (Short breath)  iodine (Short breath; Swelling)  penicillin (Anaphylaxis)  shellfish (Anaphylaxis)  tetanus toxoid (Short breath)  Valium (Short breath)    Intolerances        VITALS:    Vital Signs Last 24 Hrs  T(C): 36.2 (10 Nov 2022 12:00), Max: 37 (09 Nov 2022 19:00)  T(F): 97.1 (10 Nov 2022 12:00), Max: 98.6 (09 Nov 2022 19:00)  HR: 66 (10 Nov 2022 12:00) (64 - 776)  BP: 138/67 (10 Nov 2022 12:00) (120/56 - 162/69)  BP(mean): 87 (10 Nov 2022 12:00) (78 - 101)  RR: 20 (10 Nov 2022 12:00) (19 - 24)  SpO2: 100% (10 Nov 2022 12:00) (95% - 100%)    Parameters below as of 10 Nov 2022 11:44  Patient On (Oxygen Delivery Method): tracheostomy collar        LABS:                          9.4    12.70 )-----------( 291      ( 10 Nov 2022 01:52 )             30.6       11-10    137  |  101  |  19  ----------------------------<  152<H>  4.1   |  28  |  0.44<L>    Ca    9.1      10 Nov 2022 01:52  Phos  3.4     11-10  Mg     1.9     11-10    TPro  6.8  /  Alb  3.3  /  TBili  0.2  /  DBili  x   /  AST  17  /  ALT  15  /  AlkPhos  115  11-10      CAPILLARY BLOOD GLUCOSE      POCT Blood Glucose.: 140 mg/dL (10 Nov 2022 05:06)  POCT Blood Glucose.: 149 mg/dL (09 Nov 2022 23:21)  POCT Blood Glucose.: 190 mg/dL (09 Nov 2022 21:45)  POCT Blood Glucose.: 182 mg/dL (09 Nov 2022 20:39)  POCT Blood Glucose.: 179 mg/dL (09 Nov 2022 19:33)  POCT Blood Glucose.: 137 mg/dL (09 Nov 2022 18:16)  POCT Blood Glucose.: 146 mg/dL (09 Nov 2022 17:17)  POCT Blood Glucose.: 117 mg/dL (09 Nov 2022 16:30)  POCT Blood Glucose.: 100 mg/dL (09 Nov 2022 15:27)  POCT Blood Glucose.: 91 mg/dL (09 Nov 2022 14:31)  POCT Blood Glucose.: 93 mg/dL (09 Nov 2022 13:29)          LOWER EXTREMITY PHYSICAL EXAM:    Vasular: DP/PT 1_/4, B/L, CFT <_2 seconds B/L, Temperature gradient _WNL, B/L.   Neuro: Epicritic sensation _diminished to the level of toes_, B/L.  Skin: left heel DTI/fat pad atrophy.  0.5 cm in diameter.  No open skin lesions.  No fluctuance or clinical signs of infection left lower extremity  Wound #1:   Location: right medial midfoot.  Size: 0.7 cm in diameter  Depth: 0.2 cm in depth  Wound bed: red granular tissue  Drainage: nonfluctuant/none  Odor: none  Periwound:no clinical signs of infection  Etiology: present on admission    RADIOLOGY & ADDITIONAL STUDIES:

## 2022-11-10 NOTE — PROGRESS NOTE ADULT - ASSESSMENT
assessment/plan:    Chronic ulceration right medial midfoot: stable, noninfected, present on admission.  Left heel DTI: Noninfected, present remission.  Diabetes mellitus    Recommend normal saline cleanse with mupirocin ointment and DSD to the right foot ulceration daily.  Recommend continue decubitus precautions and use of Z flow boots.  Recommend cavilon  every other day to left heel.  We will continue to monitor for signs of infection and wound care recommendations while in house.  Patient to follow-up with Dr. Love at [phone] for continued wound care management as an outpatient

## 2022-11-10 NOTE — PROGRESS NOTE ADULT - TIME BILLING
as above: awaiting floor bed--resp stable-speaking valve in place--ambulating daily/stronger over all  -ID f/up-resp stable--wound care f/up-TC continues- (she will always have secretions) due to TBM-s/p  trach 10/10-s/p  resp failure-s/p [id number] ABX-stable CV status-re- VEST rx in use  multifactorial dyspnea-resp failure-severe persistent asthma, TBM s/p tracheoplasty, s/p Aortic aneurysm repair, Bronchitis (proteus)-O2-keep 90%;TC  severe persistent asthma--medrol 8mg, singulair 10, duoneb q 6, budes .5 bid, tezspire 8/29-was due 9/29-next upon DC  TBM-s/p tracheoplasty--accapella, vest rx, mucomyst here 2 cc 10% q 8 (secretions)  s/p Aortic aneurysm repair--as per CTS staff care  AF-on eliquis rx               CV-improved hydralazine/lopressor/mexilitine   DVT R-IJ-on heparin rx  *****ID-s/p proteus bronchitis-s/p meropenum changed to ceftriaxone and back to meropenem/vanco- off of ABX as of 9/19--restart of ABX 9/27-meropenem/vanco-s/p  meropenem but continues vanco for MRSE sepsis (11/26 end date for vanco), plastics/ortho for elbow-proteus (?wash out)-vac in place-suppressive rx-bact/fluconazole  PT-OOB as able                             GI-TF as able--f/up LFTs-normal       ****ORTHO f/up--? additional wash out of elbow wound?; wound care f/up  **VC dysfunction--aspiration precautions-s/p trach 10/10-ENT f/up s/p laryngoscopy-? decannulation    Heme onc f/up colon ca  prog--fair                PT-to continue-more OOB     DC planning    Eulalio Allison MD-Pulmonary   [phone]

## 2022-11-11 LAB
ALBUMIN SERPL ELPH-MCNC: 3.4 G/DL — SIGNIFICANT CHANGE UP (ref 3.3–5)
ALP SERPL-CCNC: 116 U/L — SIGNIFICANT CHANGE UP (ref 40–120)
ALT FLD-CCNC: 16 U/L — SIGNIFICANT CHANGE UP (ref 10–45)
ANION GAP SERPL CALC-SCNC: 11 MMOL/L — SIGNIFICANT CHANGE UP (ref 5–17)
AST SERPL-CCNC: 17 U/L — SIGNIFICANT CHANGE UP (ref 10–40)
BILIRUB SERPL-MCNC: 0.2 MG/DL — SIGNIFICANT CHANGE UP (ref 0.2–1.2)
BUN SERPL-MCNC: 17 MG/DL — SIGNIFICANT CHANGE UP (ref 7–23)
CALCIUM SERPL-MCNC: 9.2 MG/DL — SIGNIFICANT CHANGE UP (ref 8.4–10.5)
CHLORIDE SERPL-SCNC: 102 MMOL/L — SIGNIFICANT CHANGE UP (ref 96–108)
CO2 SERPL-SCNC: 27 MMOL/L — SIGNIFICANT CHANGE UP (ref 22–31)
CREAT SERPL-MCNC: 0.43 MG/DL — LOW (ref 0.5–1.3)
EGFR: 102 ML/MIN/1.73M2 — SIGNIFICANT CHANGE UP
GLUCOSE BLDC GLUCOMTR-MCNC: 128 MG/DL — HIGH (ref 70–99)
GLUCOSE BLDC GLUCOMTR-MCNC: 204 MG/DL — HIGH (ref 70–99)
GLUCOSE BLDC GLUCOMTR-MCNC: 218 MG/DL — HIGH (ref 70–99)
GLUCOSE BLDC GLUCOMTR-MCNC: 71 MG/DL — SIGNIFICANT CHANGE UP (ref 70–99)
GLUCOSE BLDC GLUCOMTR-MCNC: 77 MG/DL — SIGNIFICANT CHANGE UP (ref 70–99)
GLUCOSE BLDC GLUCOMTR-MCNC: 78 MG/DL — SIGNIFICANT CHANGE UP (ref 70–99)
GLUCOSE SERPL-MCNC: 72 MG/DL — SIGNIFICANT CHANGE UP (ref 70–99)
HCT VFR BLD CALC: 34.1 % — LOW (ref 34.5–45)
HGB BLD-MCNC: 10.4 G/DL — LOW (ref 11.5–15.5)
MAGNESIUM SERPL-MCNC: 1.8 MG/DL — SIGNIFICANT CHANGE UP (ref 1.6–2.6)
MCHC RBC-ENTMCNC: 23.2 PG — LOW (ref 27–34)
MCHC RBC-ENTMCNC: 30.5 GM/DL — LOW (ref 32–36)
MCV RBC AUTO: 75.9 FL — LOW (ref 80–100)
NRBC # BLD: 0 /100 WBCS — SIGNIFICANT CHANGE UP (ref 0–0)
PHOSPHATE SERPL-MCNC: 4.4 MG/DL — SIGNIFICANT CHANGE UP (ref 2.5–4.5)
PLATELET # BLD AUTO: SIGNIFICANT CHANGE UP K/UL (ref 150–400)
POTASSIUM SERPL-MCNC: 4.4 MMOL/L — SIGNIFICANT CHANGE UP (ref 3.5–5.3)
POTASSIUM SERPL-SCNC: 4.4 MMOL/L — SIGNIFICANT CHANGE UP (ref 3.5–5.3)
PROT SERPL-MCNC: 6.8 G/DL — SIGNIFICANT CHANGE UP (ref 6–8.3)
RBC # BLD: 4.49 M/UL — SIGNIFICANT CHANGE UP (ref 3.8–5.2)
RBC # FLD: 20.6 % — HIGH (ref 10.3–14.5)
SODIUM SERPL-SCNC: 140 MMOL/L — SIGNIFICANT CHANGE UP (ref 135–145)
WBC # BLD: 7.73 K/UL — SIGNIFICANT CHANGE UP (ref 3.8–10.5)
WBC # FLD AUTO: 7.73 K/UL — SIGNIFICANT CHANGE UP (ref 3.8–10.5)

## 2022-11-11 PROCEDURE — 99291 CRITICAL CARE FIRST HOUR: CPT | Mod: 24

## 2022-11-11 PROCEDURE — 99232 SBSQ HOSP IP/OBS MODERATE 35: CPT

## 2022-11-11 RX ORDER — HUMAN INSULIN 100 [IU]/ML
26 INJECTION, SUSPENSION SUBCUTANEOUS
Refills: 0 | Status: DISCONTINUED | OUTPATIENT
Start: 2022-11-12 | End: 2022-11-13

## 2022-11-11 RX ORDER — MAGNESIUM SULFATE 500 MG/ML
1 VIAL (ML) INJECTION ONCE
Refills: 0 | Status: COMPLETED | OUTPATIENT
Start: 2022-11-11 | End: 2022-11-11

## 2022-11-11 RX ORDER — HUMAN INSULIN 100 [IU]/ML
4 INJECTION, SUSPENSION SUBCUTANEOUS
Refills: 0 | Status: DISCONTINUED | OUTPATIENT
Start: 2022-11-12 | End: 2022-11-15

## 2022-11-11 RX ORDER — HUMAN INSULIN 100 [IU]/ML
4 INJECTION, SUSPENSION SUBCUTANEOUS ONCE
Refills: 0 | Status: COMPLETED | OUTPATIENT
Start: 2022-11-11 | End: 2022-11-11

## 2022-11-11 RX ADMIN — Medication 4: at 17:41

## 2022-11-11 RX ADMIN — Medication 5 MILLIGRAM(S): at 05:38

## 2022-11-11 RX ADMIN — Medication 50 MILLIGRAM(S): at 23:28

## 2022-11-11 RX ADMIN — Medication 3 MILLILITER(S): at 18:00

## 2022-11-11 RX ADMIN — Medication 50 MILLIGRAM(S): at 13:07

## 2022-11-11 RX ADMIN — CHLORHEXIDINE GLUCONATE 1 APPLICATION(S): 213 SOLUTION TOPICAL at 05:39

## 2022-11-11 RX ADMIN — Medication 1 APPLICATION(S): at 12:09

## 2022-11-11 RX ADMIN — CALAMINE AND ZINC OXIDE AND PHENOL 1 APPLICATION(S): 160; 10 LOTION TOPICAL at 05:39

## 2022-11-11 RX ADMIN — MAGNESIUM OXIDE 400 MG ORAL TABLET 400 MILLIGRAM(S): 241.3 TABLET ORAL at 08:37

## 2022-11-11 RX ADMIN — Medication 1 APPLICATION(S): at 13:07

## 2022-11-11 RX ADMIN — NYSTATIN CREAM 1 APPLICATION(S): 100000 CREAM TOPICAL at 17:44

## 2022-11-11 RX ADMIN — Medication 100 GRAM(S): at 08:37

## 2022-11-11 RX ADMIN — APIXABAN 5 MILLIGRAM(S): 2.5 TABLET, FILM COATED ORAL at 05:39

## 2022-11-11 RX ADMIN — Medication 0.5 MILLIGRAM(S): at 05:27

## 2022-11-11 RX ADMIN — NYSTATIN CREAM 1 APPLICATION(S): 100000 CREAM TOPICAL at 05:38

## 2022-11-11 RX ADMIN — MAGNESIUM OXIDE 400 MG ORAL TABLET 400 MILLIGRAM(S): 241.3 TABLET ORAL at 13:06

## 2022-11-11 RX ADMIN — Medication 500 MILLIGRAM(S): at 13:05

## 2022-11-11 RX ADMIN — HUMAN INSULIN 4 UNIT(S): 100 INJECTION, SUSPENSION SUBCUTANEOUS at 04:09

## 2022-11-11 RX ADMIN — Medication 3 MILLILITER(S): at 11:49

## 2022-11-11 RX ADMIN — FLUCONAZOLE 150 MILLIGRAM(S): 150 TABLET ORAL at 23:30

## 2022-11-11 RX ADMIN — Medication 3 MILLILITER(S): at 23:12

## 2022-11-11 RX ADMIN — Medication 1 TABLET(S): at 13:08

## 2022-11-11 RX ADMIN — MEXILETINE HYDROCHLORIDE 200 MILLIGRAM(S): 150 CAPSULE ORAL at 05:38

## 2022-11-11 RX ADMIN — Medication 4 MILLILITER(S): at 18:01

## 2022-11-11 RX ADMIN — Medication 5 MILLIGRAM(S): at 13:05

## 2022-11-11 RX ADMIN — CHLORHEXIDINE GLUCONATE 1 APPLICATION(S): 213 SOLUTION TOPICAL at 23:30

## 2022-11-11 RX ADMIN — Medication 3 MILLILITER(S): at 05:27

## 2022-11-11 RX ADMIN — Medication 250 MILLIGRAM(S): at 10:15

## 2022-11-11 RX ADMIN — Medication 50 MILLIGRAM(S): at 05:39

## 2022-11-11 RX ADMIN — Medication 4 MILLILITER(S): at 05:27

## 2022-11-11 RX ADMIN — Medication 4: at 12:07

## 2022-11-11 RX ADMIN — Medication 650 MILLIGRAM(S): at 05:45

## 2022-11-11 RX ADMIN — Medication 0.5 MILLIGRAM(S): at 18:00

## 2022-11-11 RX ADMIN — PANTOPRAZOLE SODIUM 40 MILLIGRAM(S): 20 TABLET, DELAYED RELEASE ORAL at 13:05

## 2022-11-11 RX ADMIN — Medication 250 MILLIGRAM(S): at 23:00

## 2022-11-11 RX ADMIN — APIXABAN 5 MILLIGRAM(S): 2.5 TABLET, FILM COATED ORAL at 17:39

## 2022-11-11 RX ADMIN — Medication 12.5 MILLIGRAM(S): at 05:38

## 2022-11-11 RX ADMIN — MEXILETINE HYDROCHLORIDE 200 MILLIGRAM(S): 150 CAPSULE ORAL at 13:06

## 2022-11-11 RX ADMIN — Medication 8 MILLIGRAM(S): at 07:26

## 2022-11-11 RX ADMIN — Medication 12.5 MILLIGRAM(S): at 17:38

## 2022-11-11 NOTE — PROGRESS NOTE ADULT - ASSESSMENT

## 2022-11-11 NOTE — PROGRESS NOTE ADULT - ASSESSMENT
72 F w/h/o uncontrolled T2DM (A1C 9.4%) on basal/bolus insulin PTA. Unknown DM complications. Also COPD, secondary adrenal Insufficiency on chronic steroids, colorectal cancer s/p resection (colostomy bag), Chronic A fib on Eliquis, and tracheomalacia and multiple intubations, recent dx of OM presented to ED at [facility] with epigastric pain, belching and central chest pain. Found on CTA to have type A aortic dissection and transferred to [facility] for surgical evaluation by Dr. Cabrera. Now s/p aortic dissection repair on 9/07/22, sepsis, and now s/p trach 10/10 and more recently s/p R elbow eschar debridement 10/22 > Wound VAC 10/24.  Endocrine consulted for assistance with uncontrolled DM/steroids for AI. Pt back in CTU. Noted last 24 hours insulin administration record including 1/2 NPH dose at 12mn with BG 70s at 6am. Also BG >200s at 12noon after dose of NPH held in am for BG in 70s. No s/sx of hypoglycemia. Will decrease insulin doses preventively to keep BG goal 100 to 180s. Per I&Os flowsheet, TFs were not held overnight. On  Prednisone dose 8mg for AI. Tolerating TF @ goal rate.   Spoke to RN about the need to obtain 50% NPH insulin dose for BG < 120 instead of holding insulin specially in am when pt receives steroid dose.

## 2022-11-11 NOTE — PROGRESS NOTE ADULT - TIME BILLING
as above: still awaiting floor bed--resp stable-speaking valve in place--ambulating daily/stronger over all  -ID f/up-resp stable--wound care f/up-TC continues- (she will always have secretions) due to TBM-s/p  trach 10/10-s/p  resp failure-s/p [id number] ABX-stable CV status-re- VEST rx in use  multifactorial dyspnea-resp failure-severe persistent asthma, TBM s/p tracheoplasty, s/p Aortic aneurysm repair, Bronchitis (proteus)-O2-keep 90%;TC  severe persistent asthma--medrol 8mg, singulair 10, duoneb q 6, budes .5 bid, tezspire 8/29-was due 9/29-next upon DC  TBM-s/p tracheoplasty--accapella, vest rx, mucomyst here 2 cc 10% q 8 (secretions)  s/p Aortic aneurysm repair--as per CTS staff care  AF-on eliquis rx               CV-improved hydralazine/lopressor/mexilitine   DVT R-IJ-on heparin rx  *****ID-s/p proteus bronchitis-s/p meropenum changed to ceftriaxone and back to meropenem/vanco- off of ABX as of 9/19--restart of ABX 9/27-meropenem/vanco-s/p  meropenem but continues vanco for MRSE sepsis (11/26 end date for vanco), plastics/ortho for elbow-proteus (?wash out)-vac in place-suppressive rx-bact/fluconazole  PT-OOB as able                             GI-TF as able--f/up LFTs-normal       ****ORTHO f/up--? additional wash out of elbow wound?; wound care f/up  **VC dysfunction--aspiration precautions-s/p trach 10/10-ENT f/up s/p laryngoscopy-? decannulation    Heme onc f/up colon ca  prog--fair                PT-to continue-more OOB     DC planning    Eulalio Allison MD-Pulmonary   [phone]

## 2022-11-11 NOTE — PROGRESS NOTE ADULT - PROBLEM SELECTOR PLAN 1
-test BG q1h until tomorrow at 6am for safety then q6h if NPO/TF   -C/w NPH doses as follows:   12am: c/w NPH 4 units.    6am: C/w NPH 26 units. Give 50% of dose if BG less than 120mg/dl. DO NOT HOLD IF RECEIVING TF  12pm: No NPH insulin     6pm: No NPH dose for now.   -If TFs off after NPH is given please start D5 infusion at TF rate to prevent rebound hypoglycemia.   -C/w Admelog moderate correction scale q6h for now  -Will adjust as needed  Discharge plan:  - Likely to discharge patient on basal/bolus insulin. Final regimen pending clinical course.  - Recommend routine outpatient ophthalmology, podiatry  - Can f/u with endocrinologist Dr. Saavedra.  - Make sure pt has Rx for all DM supplies and insulin/ DM meds.  -Based on pt's clinical condition and mental status at this time she is not able to independently manage her DM. Will evaluate pt's ability to manage DM at time of discharge. If unable> pt will need family/ care giver (s) to manage DM care at home  -Will need rehab.

## 2022-11-11 NOTE — PROGRESS NOTE ADULT - SUBJECTIVE AND OBJECTIVE BOX
Patient seen and examined at the bedside.    Remained critically ill on continuous ICU monitoring.    OBJECTIVE:  Vital Signs Last 24 Hrs  T(C): 36.9 (11 Nov 2022 02:00), Max: 36.9 (11 Nov 2022 02:00)  T(F): 98.5 (11 Nov 2022 02:00), Max: 98.5 (11 Nov 2022 02:00)  HR: 62 (11 Nov 2022 07:00) (59 - 81)  BP: 130/61 (11 Nov 2022 06:00) (120/67 - 164/67)  BP(mean): 88 (11 Nov 2022 06:00) (74 - 99)  RR: 26 (11 Nov 2022 07:00) (17 - 36)  SpO2: 100% (11 Nov 2022 07:00) (83% - 100%)    Parameters below as of 11 Nov 2022 05:47  Patient On (Oxygen Delivery Method): tracheostomy collar            Physical Exam:   General: Trach collar, NAD, Ambulates well  Neurology: Nonfocal, responds to commands. Moves all extremities  ENT/Neck: + trach c/d/i, neck supple, trachea midline   Respiratory: coarse BS b/l, rhonchi throughout, minimal secretions present  CV: S1S2, no murmurs        [x] Sinus Rhythm   Abdominal: Soft, NT, ND, colostomy in place (stoma intact)  Extremities: nonpitting edema, improved. Right elbow wound vac c/d/i. warm and well-perfused.  Skin: Dry dressing over right heel. No cyanosis                         Assessment:  71F PMH DM, COPD, chronic adrenal insufficiency on Prednisone, history of colorectal cancer s/p resection (colostomy bag), Hx of CAD, chronic AF on Eliquis, and tracheomalacia s/p multiple intubations, recent dx of R foot OM s/p debridement on antibiotics and presented to ED at [facility] this morning with epigastric pain, belching and central chest pain. Found on CTA to have type A aortic dissection and transferred to [facility] for surgical evaluation by Dr. Cabrera.     Ascending aortic dissection s/p type A dissection repair and Biobentall on 9/7   Respiratory failure s/p Trach 10/10/22  Hypovolemia   Post op respiratory failure   Acute blood loss anemia  Stress hyperglycemia   Leukocytosis   RIJ thrombus  MRSA PNA      Plan:   ***Neuro***  [x] Nonfocal  follows commands, continue to monitor  PT/OT progress as tolerated, ambulated with physical therapy  OOBTC      ***Cardiovascular***  Limited TTE on 10/1: EF 69%, Hyperdynamic left ventricular systolic function. There is hypokinesis of the mid-base inferior wall. Nml RV fxn.   CT chest on 9/28: No CT evidence of pulmonary embolism. Status post repair of ascending aortic dissection with a stable dissection flap originating from the aortic arch and extending into the infrarenal abdominal aorta,  B/l UE duplex on 10/5: As before, there is an occluded RIGHT IJ vein with thrombosis extending to brachiocephalic vein. Superficial thrombophlebitis of the LEFT cephalic vein.   Invasive hemodynamic monitoring, assess perfusion indices   SR / Hct 30.6/ Lactate 1.5  Hydralazine PO for BP management   Rate control with Lopressor and mexiletine   Wound VAC on rt elbow intact  [x] AC Therapy with Eliquis 5 BID for Afib and IJ thrombus  Stopped daily blood work (T/Th/Sat)  Serial EKG and cardiac enzymes     ***Pulmonary***  Trach collar 10L/40% since 10/25, possible decannulation this week if secretions manageable  Respiratory failure s/p Trach #8 portex  10/10/22, per ENT inner cannula needs to be changed daily, trach sutures d/c'ed by ENT 10/17 , 40% trach collar since 10/25  Daily X-rays changed to (T/Th/Sat)  Titration of FiO2, follow SpO2, CXR, blood gases   Bronched 10/13  Continue duonebs  Suction q2hrly as needed   Aggressive Pulmonary toilet  Downsized to 7 cuffless trach 11/3      ***GI***  [x] Diet: TFs Glucerna 1.2 @ goal 65ml/hr, tolerating without issues (when glucerna 1.5 back in stock will switch and decrease goal rate to 55ml/hr)  [x] Protonix    Reglan for gut motility       ***Renal***  Continue to monitor I/Os, BUN/Creatinine.   Replete lytes PRN      ***ID***  SCx on 10/4+Methicillin resistant Staphylococcus aureus, c/w IVPB Vancomycin, (plan for 6 week course in setting of valve surgery, 11/26 end date)  9/27 blood culture Neela Glabarata, on flucanazole (lifelong suppression)  BCx 10/13 NGTD, BC 10/21 and SCx NGTD  R elbow wound, S/p IR for elbow drainage 10/13 + Few Proteus mirabilis ESBL, Meropenem 7 day trial completed 10/19-> reordered 10/20 for reaccumulation of elbow bursitis-  10/22 plastics debrided R elbow eschar to subc tissue, ~8cc fluid aspirated and sent for culture. VAC changes done by PT   Joint Fluid Cx 10/22 - p. mirabilis s/p Meropenem for 8 day course  cont vanco (until 11/26 for 6 week course) and fluconazole for lifelong suppression  needs to be on isolation until vanco complete and then re culture 3 days later and if clear can be off isolation       ***Endocrine***  [x]  DM : HbA1c 9.4%                - [x] ISS  [x] NPH              - Need tight glycemic control to prevent wound infection.  Endocrine following, appreciate recommendations    Stable for transfer to SDU            Patient requires continuous monitoring with bedside rhythm monitoring, pulse oximetry monitoring, and continuous central venous and arterial pressure monitoring; and intermittent blood gas analysis. Care plan discussed with the ICU care team.   Patient remained critical, at risk for life threatening decompensation.    I have spent 30 minutes providing critical care management to this patient.    By signing my name below, I, Maria D'Amico, attest that this documentation has been prepared under the direction and in the presence of Edson Chris NP   Electronically signed: Maria D'Amico, Scribe, 11-11-22 @ 07:45    I, Edson Chris NP , personally performed the services described in this documentation. all medical record entries made by the [email or web address] were at my direction and in my presence. I have reviewed the chart and agree that the record reflects my personal performance and is accurate and complete  Electronically signed: Edson Chris NP  Patient seen and examined at the bedside.    Remained critically ill on continuous ICU monitoring.    OBJECTIVE:  Vital Signs Last 24 Hrs  T(C): 36.9 (11 Nov 2022 02:00), Max: 36.9 (11 Nov 2022 02:00)  T(F): 98.5 (11 Nov 2022 02:00), Max: 98.5 (11 Nov 2022 02:00)  HR: 62 (11 Nov 2022 07:00) (59 - 81)  BP: 130/61 (11 Nov 2022 06:00) (120/67 - 164/67)  BP(mean): 88 (11 Nov 2022 06:00) (74 - 99)  RR: 26 (11 Nov 2022 07:00) (17 - 36)  SpO2: 100% (11 Nov 2022 07:00) (83% - 100%)    Parameters below as of 11 Nov 2022 05:47  Patient On (Oxygen Delivery Method): tracheostomy collar            Physical Exam:   General: Trach collar, NAD, Ambulates well  Neurology: Nonfocal, responds to commands. Moves all extremities  ENT/Neck: + trach c/d/i, neck supple, trachea midline   Respiratory: coarse BS b/l, rhonchi throughout, minimal secretions present  CV: S1S2, no murmurs        [x] Sinus Rhythm   Abdominal: Soft, NT, ND, colostomy in place (stoma intact)  Extremities: nonpitting edema, improved. Right elbow wound vac c/d/i. warm and well-perfused.  Skin: Dry dressing over right heel. No cyanosis                         Assessment:  71F PMH DM, COPD, chronic adrenal insufficiency on Prednisone, history of colorectal cancer s/p resection (colostomy bag), Hx of CAD, chronic AF on Eliquis, and tracheomalacia s/p multiple intubations, recent dx of R foot OM s/p debridement on antibiotics and presented to ED at [facility] this morning with epigastric pain, belching and central chest pain. Found on CTA to have type A aortic dissection and transferred to [facility] for surgical evaluation by Dr. Cabrera.     Ascending aortic dissection s/p type A dissection repair and Biobentall on 9/7   Respiratory failure s/p Trach 10/10/22  Hypovolemia   Post op respiratory failure   Acute blood loss anemia  Stress hyperglycemia   Leukocytosis   RIJ thrombus  MRSA PNA      Plan:   ***Neuro***  [x] Nonfocal  follows commands, continue to monitor  PT/OT progress as tolerated, ambulated with physical therapy  OOBTC      ***Cardiovascular***  Limited TTE on 10/1: EF 69%, Hyperdynamic left ventricular systolic function. There is hypokinesis of the mid-base inferior wall. Nml RV fxn.   CT chest on 9/28: No CT evidence of pulmonary embolism. Status post repair of ascending aortic dissection with a stable dissection flap originating from the aortic arch and extending into the infrarenal abdominal aorta,  B/l UE duplex on 10/5: As before, there is an occluded RIGHT IJ vein with thrombosis extending to brachiocephalic vein. Superficial thrombophlebitis of the LEFT cephalic vein.   Invasive hemodynamic monitoring, assess perfusion indices   SR / Hct 30.6/ Lactate 1.5  Hydralazine PO for BP management   Rate control with Lopressor and mexiletine   Wound VAC on rt elbow intact, changed this morning  [x] AC Therapy with Eliquis 5 BID for Afib and IJ thrombus  Stopped daily blood work (T/Th/Sat)    ***Pulmonary***  Trach collar 10L/40% since 10/25, possible decannulation this week if secretions manageable  Respiratory failure s/p Trach #8 portex  10/10/22, per ENT inner cannula needs to be changed daily, trach sutures d/c'ed by ENT 10/17 , 40% trach collar since 10/25  Daily X-rays changed to (T/Th/Sat)  Titration of FiO2, follow SpO2, CXR, blood gases   Bronched 10/13  Continue duonebs  Suction as needed   Aggressive Pulmonary toilet  Downsized to 7 cuffless trach 11/3      ***GI***  [x] Diet: TFs Glucerna 1.2 @ goal 65ml/hr, tolerating without issues (when glucerna 1.5 back in stock will switch and decrease goal rate to 55ml/hr)  [x] Protonix    Reglan for gut motility       ***Renal***  Continue to monitor I/Os, BUN/Creatinine.   Replete lytes PRN      ***ID***  SCx on 10/4+Methicillin resistant Staphylococcus aureus, c/w IVPB Vancomycin, (plan for 6 week course in setting of valve surgery, 11/26 end date)  9/27 blood culture Neela Glabarata, on flucanazole (lifelong suppression)  BCx 10/13 NGTD, BC 10/21 and SCx NGTD  R elbow wound, S/p IR for elbow drainage 10/13 + Few Proteus mirabilis ESBL, Meropenem 7 day trial completed 10/19-> reordered 10/20 for reaccumulation of elbow bursitis-  10/22 plastics debrided R elbow eschar to subc tissue, ~8cc fluid aspirated and sent for culture. VAC changes done by PT   Joint Fluid Cx 10/22 - p. mirabilis s/p Meropenem for 8 day course  cont vanco (until 11/26 for 6 week course) and fluconazole for lifelong suppression  needs to be on isolation until vanco complete and then re culture 3 days later and if clear can be off isolation       ***Endocrine***  [x]  DM : HbA1c 9.4%                - [x] ISS  [x] NPH              - Need tight glycemic control to prevent wound infection.  Endocrine following, appreciate recommendations              Patient requires continuous monitoring with bedside rhythm monitoring, pulse oximetry monitoring, and continuous central venous and arterial pressure monitoring; and intermittent blood gas analysis. Care plan discussed with the ICU care team.   Patient remained critical, at risk for life threatening decompensation.    I have spent 30 minutes providing critical care management to this patient.    By signing my name below, I, Maria D'Amico, attest that this documentation has been prepared under the direction and in the presence of Edson Chris NP   Electronically signed: Maria D'Amico, Scribe, 11-11-22 @ 07:45    I, Edson Chris NP , personally performed the services described in this documentation. all medical record entries made by the scribe were at my direction and in my presence. I have reviewed the chart and agree that the record reflects my personal performance and is accurate and complete  Electronically signed: Edson Chris NP

## 2022-11-11 NOTE — PROGRESS NOTE ADULT - SUBJECTIVE AND OBJECTIVE BOX
DIABETES FOLLOW UP NOTE: Saw pt earlier today    Chief Complaint: Endocrine consult requested for management of T2DM    INTERVAL HX: Pt transferred back to CTU. Per staff tolerating 24 hour TFs of Glucerna at 65cc/hr. BG levels [id number] to low 100s overnight. Noted 50% NPH dose given at 12mn for BG 70s nut no NPH given this am for BG 78> pt on steroids with BG at 12noon >200s.  Pt states feeling stronger every day> ready to work with PT today. Denies any hypoglycemic symptoms. On chronic Prednisone 8mg for AI. On antibiotics for sepsis. Pt able to talk via trach voice valve.         Review of Systems:  General: As above  Cardiovascular: No chest pain, palpitations  Respiratory: No SOB, no cough  GI: No nausea, vomiting, abdominal pain  Endocrine: No S&Sx of hypoglycemia    Allergies    aspirin (Short breath)  Avelox (Short breath; Pruritus)  cefepime (Anaphylaxis)  codeine (Short breath)  Dilaudid (Short breath)  iodine (Short breath; Swelling)  penicillin (Anaphylaxis)  shellfish (Anaphylaxis)  tetanus toxoid (Short breath)  Valium (Short breath)    Intolerances      MEDICATIONS:  fluconAZOLE IVPB 600 milliGRAM(s) IV Intermittent every 24 hours  insulin lispro (ADMELOG) corrective regimen sliding scale   SubCutaneous every 6 hours  insulin NPH human recombinant 8 Unit(s) SubCutaneous <User Schedule>  insulin NPH human recombinant 50 Unit(s) SubCutaneous <User Schedule>  methylPREDNISolone 8 milliGRAM(s) Oral daily  vancomycin  IVPB 750 milliGRAM(s) IV Intermittent every 12 hours  vancomycin  IVPB          PHYSICAL EXAM:  VITALS: T(C): 36.3 (11-11-22 @ 16:00)  T(F): 97.4 (11-11-22 @ 16:00), Max: 98.5 (11-11-22 @ 02:00)  HR: 71 (11-11-22 @ 16:00) (59 - 77)  BP: 125/60 (11-11-22 @ 16:00) (121/58 - 164/67)  RR:  (21 - 36)  SpO2:  (83% - 100%)  Wt(kg): --  GENERAL: Female sitting in chair in NAD.   HEENT: Trach in place with voice valve, NGT with TFs on at time of visit.   Chest: Sternal incision healed   Abdomen: Soft, nontender, non distended  Extremities: Warm, no edema in all 4 exts. RUE wound to vac with whitish foamy out put.   NEURO: Alert, able to answer simple questions    LABS:  POCT Blood Glucose.: 218 mg/dL (11-11-22 @ 12:01)  POCT Blood Glucose.: 78 mg/dL (11-11-22 @ 07:11)  POCT Blood Glucose.: 77 mg/dL (11-11-22 @ 07:09)  POCT Blood Glucose.: 71 mg/dL (11-11-22 @ 00:40)  POCT Blood Glucose.: 110 mg/dL (11-10-22 @ 17:36)  POCT Blood Glucose.: 102 mg/dL (11-10-22 @ 12:02)  POCT Blood Glucose.: 140 mg/dL (11-10-22 @ 05:06)  POCT Blood Glucose.: 149 mg/dL (11-09-22 @ 23:21)  POCT Blood Glucose.: 190 mg/dL (11-09-22 @ 21:45)  POCT Blood Glucose.: 182 mg/dL (11-09-22 @ 20:39)  POCT Blood Glucose.: 179 mg/dL (11-09-22 @ 19:33)  POCT Blood Glucose.: 137 mg/dL (11-09-22 @ 18:16)  POCT Blood Glucose.: 146 mg/dL (11-09-22 @ 17:17)  POCT Blood Glucose.: 117 mg/dL (11-09-22 @ 16:30)  POCT Blood Glucose.: 100 mg/dL (11-09-22 @ 15:27)  POCT Blood Glucose.: 91 mg/dL (11-09-22 @ 14:31)  POCT Blood Glucose.: 93 mg/dL (11-09-22 @ 13:29)  POCT Blood Glucose.: 104 mg/dL (11-09-22 @ 11:09)  POCT Blood Glucose.: 121 mg/dL (11-09-22 @ 05:49)  POCT Blood Glucose.: 88 mg/dL (11-08-22 @ 23:58)  POCT Blood Glucose.: 181 mg/dL (11-08-22 @ 18:09)                            10.4   7.73  )-----------( Clumped    ( 11 Nov 2022 07:26 )             34.1       11-11    140  |  102  |  17  ----------------------------<  72  4.4   |  27  |  0.43<L>    eGFR: 102    Ca    9.2      11-11  Mg     1.8     11-11  Phos  4.4     11-11    TPro  6.8  /  Alb  3.4  /  TBili  0.2  /  DBili  x   /  AST  17  /  ALT  16  /  AlkPhos  116  11-11      A1C with Estimated Average Glucose Result: 9.4 % (09-06-22 @ 16:46)      Estimated Average Glucose: 223 mg/dL (09-06-22 @ 16:46)

## 2022-11-11 NOTE — CHART NOTE - NSCHARTNOTEFT_GEN_A_CORE
Consult requested for foot wound.  Pt being followed by dpm.  D/w team and will defer to dpm.  Remain available as requested.

## 2022-11-11 NOTE — PROGRESS NOTE ADULT - NSPROGADDITIONALINFOA_GEN_ALL_CORE
-Plan discussed with pt/team.  Contact info: [phone] (24/7). pager [phone]  [email or web address] password [email or web address]  Teams  Spent 30 minutes assessing pt/labs/meds and discussing plan of care with primary team  Adjusting insulin  Discharge plan  Follow up care

## 2022-11-11 NOTE — PROGRESS NOTE ADULT - SUBJECTIVE AND OBJECTIVE BOX
CHIEF COMPLAINT: f/up sob, chronic resp failure, TBM, severe persistent asthma, VC dysfunction, Type A aortic dissection s/p repair w/modified "Bentall procedure and hemiarch replacement 9/6-trached--on TC-quiet night    Interval Events: none    REVIEW OF SYSTEMS:  Constitutional: No fevers or chills. No weight loss. No fatigue or generalized malaise.  Eyes: No itching or discharge from the eyes  ENT: No ear pain. No ear discharge. No nasal congestion. No post nasal drip. No epistaxis. No throat pain. No sore throat. No difficulty swallowing.   CV: No chest pain. No palpitations. No lightheadedness or dizziness.   Resp: No dyspnea at rest. No dyspnea on exertion. No orthopnea. No wheezing. No cough. No stridor. No sputum production. No chest pain with respiration.  GI: No nausea. No vomiting. No diarrhea.  MSK: No joint pain or pain in any extremities  Integumentary: No skin lesions. No pedal edema.  Neurological: + gross motor weakness. No sensory changes.  [ +] All other systems negative  [ ] Unable to assess ROS because ________    OBJECTIVE:  ICU Vital Signs Last 24 Hrs  T(C): 36.7 (10 Nov 2022 20:00), Max: 36.7 (10 Nov 2022 20:00)  T(F): 98.1 (10 Nov 2022 20:00), Max: 98.1 (10 Nov 2022 20:00)  HR: 68 (11 Nov 2022 04:00) (59 - 81)  BP: 124/59 (11 Nov 2022 04:00) (120/67 - 164/67)  BP(mean): 85 (11 Nov 2022 04:00) (74 - 99)  ABP: --  ABP(mean): --  RR: 21 (11 Nov 2022 04:00) (17 - 31)  SpO2: 100% (11 Nov 2022 04:00) (83% - 100%)    O2 Parameters below as of 11 Nov 2022 04:00  Patient On (Oxygen Delivery Method): tracheostomy collar    O2 Concentration (%): 40          11-09 @ 07:01  -  11-10 @ 07:00  --------------------------------------------------------  IN: 3110 mL / OUT: 2025 mL / NET: 1085 mL    11-10 @ 07:01  - 11-11 @ 05:10  --------------------------------------------------------  IN: 2520 mL / OUT: 1200 mL / NET: 1320 mL      CAPILLARY BLOOD GLUCOSE      POCT Blood Glucose.: 71 mg/dL (11 Nov 2022 00:40)      PHYSICAL EXAM: NAD in bed on TC  General: Awake, alert, oriented X 3.   HEENT: Atraumatic, normocephalic.                 Mallampatti Grade 3                No nasal congestion.                No tonsillar or pharyngeal exudates.  Lymph Nodes: No palpable lymphadenopathy  Neck: No JVD. No carotid bruit.   Respiratory: abnormal chest expansion                         Normal percussion                         Normal and equal air entry                         No wheeze, rhonchi or rales.  Cardiovascular: S1 S2 normal. No murmurs, rubs or gallops.   Abdomen: Soft, non-tender, non-distended. No organomegaly. Normoactive bowel sounds.  Extremities: Warm to touch. Peripheral pulse palpable. No pedal edema.   Skin: No rashes or skin lesions  Neurological: Motor and sensory examination equal and normal in all four extremities.  Psychiatry: Appropriate mood and affect.    HOSPITAL MEDICATIONS:  MEDICATIONS  (STANDING):  acetylcysteine 10%  Inhalation 4 milliLiter(s) Inhalation every 12 hours  albuterol/ipratropium for Nebulization 3 milliLiter(s) Nebulizer every 6 hours  apixaban 5 milliGRAM(s) Enteral Tube every 12 hours  ascorbic acid 500 milliGRAM(s) Oral daily  buDESOnide    Inhalation Suspension 0.5 milliGRAM(s) Inhalation every 12 hours  chlorhexidine 2% Cloths 1 Application(s) Topical <User Schedule>  collagenase Ointment 1 Application(s) Topical daily  diphenhydramine 2%/zinc acetate 0.1% Cream 1 Application(s) Topical every 8 hours  fluconAZOLE IVPB 600 milliGRAM(s) IV Intermittent every 24 hours  hydrALAZINE 50 milliGRAM(s) Oral three times a day  insulin lispro (ADMELOG) corrective regimen sliding scale   SubCutaneous every 6 hours  insulin NPH human recombinant 8 Unit(s) SubCutaneous <User Schedule>  insulin NPH human recombinant 50 Unit(s) SubCutaneous <User Schedule>  magnesium oxide 400 milliGRAM(s) Oral every 8 hours  methylPREDNISolone 8 milliGRAM(s) Oral daily  metoclopramide Injectable 5 milliGRAM(s) IV Push every 8 hours  metoprolol tartrate 12.5 milliGRAM(s) Enteral Tube two times a day  mexiletine 200 milliGRAM(s) Oral every 8 hours  multivitamin 1 Tablet(s) Oral daily  nystatin Powder 1 Application(s) Topical two times a day  pantoprazole  Injectable 40 milliGRAM(s) IV Push daily  vancomycin  IVPB 750 milliGRAM(s) IV Intermittent every 12 hours  vancomycin  IVPB        MEDICATIONS  (PRN):  acetaminophen    Suspension .. 650 milliGRAM(s) Oral every 6 hours PRN Temp greater or equal to 38C (100.4F), Mild Pain (1 - 3)  calamine/zinc oxide Lotion 1 Application(s) Topical every 8 hours PRN Itching      LABS:                        9.4    12.70 )-----------( 291      ( 10 Nov 2022 01:52 )             30.6     11-10    137  |  101  |  19  ----------------------------<  152<H>  4.1   |  28  |  0.44<L>    Ca    9.1      10 Nov 2022 01:52  Phos  3.4     11-10  Mg     1.9     11-10    TPro  6.8  /  Alb  3.3  /  TBili  0.2  /  DBili  x   /  AST  17  /  ALT  15  /  AlkPhos  115  11-10              MICROBIOLOGY:     RADIOLOGY:  [ ] Reviewed and interpreted by me    Point of Care Ultrasound Findings:    PFT:    EKG:

## 2022-11-12 LAB
BASE EXCESS BLDV CALC-SCNC: 6.8 MMOL/L — HIGH (ref -2–3)
CA-I SERPL-SCNC: 1.24 MMOL/L — SIGNIFICANT CHANGE UP (ref 1.15–1.33)
CHLORIDE BLDV-SCNC: 99 MMOL/L — SIGNIFICANT CHANGE UP (ref 96–108)
CO2 BLDV-SCNC: 33 MMOL/L — HIGH (ref 22–26)
GAS PNL BLDV: 134 MMOL/L — LOW (ref 136–145)
GAS PNL BLDV: SIGNIFICANT CHANGE UP
GAS PNL BLDV: SIGNIFICANT CHANGE UP
GLUCOSE BLDC GLUCOMTR-MCNC: 156 MG/DL — HIGH (ref 70–99)
GLUCOSE BLDC GLUCOMTR-MCNC: 173 MG/DL — HIGH (ref 70–99)
GLUCOSE BLDC GLUCOMTR-MCNC: 185 MG/DL — HIGH (ref 70–99)
GLUCOSE BLDC GLUCOMTR-MCNC: 222 MG/DL — HIGH (ref 70–99)
GLUCOSE BLDV-MCNC: 204 MG/DL — HIGH (ref 70–99)
HCO3 BLDV-SCNC: 32 MMOL/L — HIGH (ref 22–29)
HCT VFR BLDA CALC: 33 % — LOW (ref 34.5–46.5)
HGB BLD CALC-MCNC: 11.1 G/DL — LOW (ref 11.7–16.1)
HOROWITZ INDEX BLDV+IHG-RTO: 44 — SIGNIFICANT CHANGE UP
LACTATE BLDV-MCNC: 2.4 MMOL/L — HIGH (ref 0.5–2)
PCO2 BLDV: 47 MMHG — HIGH (ref 39–42)
PH BLDV: 7.44 — HIGH (ref 7.32–7.43)
PO2 BLDV: 32 MMHG — SIGNIFICANT CHANGE UP (ref 25–45)
POTASSIUM BLDV-SCNC: 4 MMOL/L — SIGNIFICANT CHANGE UP (ref 3.5–5.1)
SAO2 % BLDV: 53.6 % — LOW (ref 67–88)
VANCOMYCIN TROUGH SERPL-MCNC: 5 UG/ML — LOW (ref 10–20)

## 2022-11-12 PROCEDURE — 99232 SBSQ HOSP IP/OBS MODERATE 35: CPT

## 2022-11-12 PROCEDURE — 71045 X-RAY EXAM CHEST 1 VIEW: CPT | Mod: 26

## 2022-11-12 PROCEDURE — 99291 CRITICAL CARE FIRST HOUR: CPT | Mod: 24

## 2022-11-12 RX ORDER — ONDANSETRON 8 MG/1
4 TABLET, FILM COATED ORAL ONCE
Refills: 0 | Status: COMPLETED | OUTPATIENT
Start: 2022-11-12 | End: 2022-11-12

## 2022-11-12 RX ORDER — MAGNESIUM SULFATE 500 MG/ML
1 VIAL (ML) INJECTION ONCE
Refills: 0 | Status: COMPLETED | OUTPATIENT
Start: 2022-11-12 | End: 2022-11-12

## 2022-11-12 RX ORDER — METOPROLOL TARTRATE 50 MG
25 TABLET ORAL
Refills: 0 | Status: DISCONTINUED | OUTPATIENT
Start: 2022-11-12 | End: 2022-11-12

## 2022-11-12 RX ORDER — METOPROLOL TARTRATE 50 MG
12.5 TABLET ORAL
Refills: 0 | Status: DISCONTINUED | OUTPATIENT
Start: 2022-11-13 | End: 2022-11-22

## 2022-11-12 RX ADMIN — Medication 2: at 18:12

## 2022-11-12 RX ADMIN — Medication 3 MILLILITER(S): at 23:03

## 2022-11-12 RX ADMIN — MAGNESIUM OXIDE 400 MG ORAL TABLET 400 MILLIGRAM(S): 241.3 TABLET ORAL at 07:18

## 2022-11-12 RX ADMIN — Medication 1 TABLET(S): at 12:10

## 2022-11-12 RX ADMIN — ONDANSETRON 4 MILLIGRAM(S): 8 TABLET, FILM COATED ORAL at 17:03

## 2022-11-12 RX ADMIN — MEXILETINE HYDROCHLORIDE 200 MILLIGRAM(S): 150 CAPSULE ORAL at 00:50

## 2022-11-12 RX ADMIN — Medication 4 MILLILITER(S): at 05:42

## 2022-11-12 RX ADMIN — MAGNESIUM OXIDE 400 MG ORAL TABLET 400 MILLIGRAM(S): 241.3 TABLET ORAL at 15:00

## 2022-11-12 RX ADMIN — Medication 500 MILLIGRAM(S): at 12:10

## 2022-11-12 RX ADMIN — Medication 0.5 MILLIGRAM(S): at 17:32

## 2022-11-12 RX ADMIN — Medication 250 MILLIGRAM(S): at 16:00

## 2022-11-12 RX ADMIN — Medication 1 APPLICATION(S): at 13:00

## 2022-11-12 RX ADMIN — FLUCONAZOLE 150 MILLIGRAM(S): 150 TABLET ORAL at 22:34

## 2022-11-12 RX ADMIN — NYSTATIN CREAM 1 APPLICATION(S): 100000 CREAM TOPICAL at 17:06

## 2022-11-12 RX ADMIN — MEXILETINE HYDROCHLORIDE 200 MILLIGRAM(S): 150 CAPSULE ORAL at 22:34

## 2022-11-12 RX ADMIN — Medication 0.5 MILLIGRAM(S): at 05:41

## 2022-11-12 RX ADMIN — HUMAN INSULIN 4 UNIT(S): 100 INJECTION, SUSPENSION SUBCUTANEOUS at 23:15

## 2022-11-12 RX ADMIN — MEXILETINE HYDROCHLORIDE 200 MILLIGRAM(S): 150 CAPSULE ORAL at 07:18

## 2022-11-12 RX ADMIN — Medication 50 MILLIGRAM(S): at 15:00

## 2022-11-12 RX ADMIN — Medication 650 MILLIGRAM(S): at 13:00

## 2022-11-12 RX ADMIN — PANTOPRAZOLE SODIUM 40 MILLIGRAM(S): 20 TABLET, DELAYED RELEASE ORAL at 12:09

## 2022-11-12 RX ADMIN — Medication 4 MILLILITER(S): at 17:32

## 2022-11-12 RX ADMIN — Medication 4: at 23:14

## 2022-11-12 RX ADMIN — HUMAN INSULIN 26 UNIT(S): 100 INJECTION, SUSPENSION SUBCUTANEOUS at 06:54

## 2022-11-12 RX ADMIN — Medication 3 MILLILITER(S): at 12:06

## 2022-11-12 RX ADMIN — MAGNESIUM OXIDE 400 MG ORAL TABLET 400 MILLIGRAM(S): 241.3 TABLET ORAL at 22:34

## 2022-11-12 RX ADMIN — Medication 1 APPLICATION(S): at 12:10

## 2022-11-12 RX ADMIN — Medication 3 MILLILITER(S): at 05:39

## 2022-11-12 RX ADMIN — Medication 5 MILLIGRAM(S): at 06:36

## 2022-11-12 RX ADMIN — Medication 12.5 MILLIGRAM(S): at 07:09

## 2022-11-12 RX ADMIN — MEXILETINE HYDROCHLORIDE 200 MILLIGRAM(S): 150 CAPSULE ORAL at 13:00

## 2022-11-12 RX ADMIN — MAGNESIUM OXIDE 400 MG ORAL TABLET 400 MILLIGRAM(S): 241.3 TABLET ORAL at 00:50

## 2022-11-12 RX ADMIN — HUMAN INSULIN 4 UNIT(S): 100 INJECTION, SUSPENSION SUBCUTANEOUS at 00:22

## 2022-11-12 RX ADMIN — Medication 650 MILLIGRAM(S): at 12:09

## 2022-11-12 RX ADMIN — Medication 25 MILLIGRAM(S): at 16:47

## 2022-11-12 RX ADMIN — APIXABAN 5 MILLIGRAM(S): 2.5 TABLET, FILM COATED ORAL at 06:34

## 2022-11-12 RX ADMIN — Medication 8 MILLIGRAM(S): at 07:09

## 2022-11-12 RX ADMIN — Medication 5 MILLIGRAM(S): at 00:51

## 2022-11-12 RX ADMIN — Medication 3 MILLILITER(S): at 17:32

## 2022-11-12 RX ADMIN — Medication 650 MILLIGRAM(S): at 23:46

## 2022-11-12 RX ADMIN — Medication 100 GRAM(S): at 06:33

## 2022-11-12 RX ADMIN — Medication 5 MILLIGRAM(S): at 22:34

## 2022-11-12 RX ADMIN — Medication 650 MILLIGRAM(S): at 00:50

## 2022-11-12 RX ADMIN — Medication 5 MILLIGRAM(S): at 15:00

## 2022-11-12 RX ADMIN — Medication 2: at 06:51

## 2022-11-12 RX ADMIN — Medication 50 MILLIGRAM(S): at 22:34

## 2022-11-12 RX ADMIN — Medication 650 MILLIGRAM(S): at 07:18

## 2022-11-12 RX ADMIN — APIXABAN 5 MILLIGRAM(S): 2.5 TABLET, FILM COATED ORAL at 18:02

## 2022-11-12 RX ADMIN — Medication 250 MILLIGRAM(S): at 22:35

## 2022-11-12 RX ADMIN — NYSTATIN CREAM 1 APPLICATION(S): 100000 CREAM TOPICAL at 00:00

## 2022-11-12 NOTE — PROGRESS NOTE ADULT - SUBJECTIVE AND OBJECTIVE BOX
CHIEF COMPLAINT: f/up sob, chronic resp failure, TBM, severe persistent asthma, VC dysfunction, Type A aortic dissection s/p repair w/modified "Bentall procedure and hemiarch replacement 9/6-trached--on TC-good spirits/ambulating    Interval Events: ambulating-TC continues    REVIEW OF SYSTEMS:  Constitutional: No fevers or chills. No weight loss. No fatigue or generalized malaise.  Eyes: No itching or discharge from the eyes  ENT: No ear pain. No ear discharge. No nasal congestion. No post nasal drip. No epistaxis. No throat pain. No sore throat. No difficulty swallowing.   CV: No chest pain. No palpitations. No lightheadedness or dizziness.   Resp: No dyspnea at rest. No dyspnea on exertion. No orthopnea. No wheezing. No cough. No stridor. N+sputum production. No chest pain with respiration.  GI: No nausea. No vomiting. No diarrhea.  MSK: No joint pain or pain in any extremities  Integumentary: No skin lesions. No pedal edema.  Neurological: No gross motor weakness. No sensory changes.  [+ ] All other systems negative  [ ] Unable to assess ROS because ________    OBJECTIVE:  ICU Vital Signs Last 24 Hrs  T(C): 36.3 (11 Nov 2022 19:39), Max: 36.8 (11 Nov 2022 08:00)  T(F): 97.3 (11 Nov 2022 19:39), Max: 98.2 (11 Nov 2022 08:00)  HR: 68 (12 Nov 2022 03:19) (62 - 78)  BP: 158/62 (12 Nov 2022 02:00) (121/58 - 158/62)  BP(mean): 89 (12 Nov 2022 02:00) (78 - 103)  ABP: --  ABP(mean): --  RR: 30 (12 Nov 2022 03:19) (21 - 39)  SpO2: 100% (12 Nov 2022 03:19) (96% - 100%)    O2 Parameters below as of 12 Nov 2022 03:19  Patient On (Oxygen Delivery Method): tracheostomy collar  O2 Flow (L/min): 10  O2 Concentration (%): 40          11-10 @ 07:01  -  11-11 @ 07:00  --------------------------------------------------------  IN: 2780 mL / OUT: 1500 mL / NET: 1280 mL    11-11 @ 07:01  - 11-12 @ 05:11  --------------------------------------------------------  IN: 1190 mL / OUT: 650 mL / NET: 540 mL      CAPILLARY BLOOD GLUCOSE      POCT Blood Glucose.: 128 mg/dL (11 Nov 2022 23:23)      PHYSICAL EXAM: NAD in bed on TC  General: Awake, alert, oriented X 3.   HEENT: Atraumatic, normocephalic.                 Mallampatti Grade 3                No nasal congestion.                No tonsillar or pharyngeal exudates.  Lymph Nodes: No palpable lymphadenopathy  Neck: No JVD. No carotid bruit.   Respiratory: abnormal chest expansion                         Normal percussion                         Normal and equal air entry                         No wheeze, rhonchi or rales.  Cardiovascular: S1 S2 normal. No murmurs, rubs or gallops.   Abdomen: Soft, non-tender, non-distended. No organomegaly. Normoactive bowel sounds.  Extremities: Warm to touch. Peripheral pulse palpable. No pedal edema.   Skin: No rashes or skin lesions  Neurological: Motor and sensory examination equal and normal in all four extremities.  Psychiatry: Appropriate mood and affect.    HOSPITAL MEDICATIONS:  MEDICATIONS  (STANDING):  acetylcysteine 10%  Inhalation 4 milliLiter(s) Inhalation every 12 hours  albuterol/ipratropium for Nebulization 3 milliLiter(s) Nebulizer every 6 hours  apixaban 5 milliGRAM(s) Enteral Tube every 12 hours  ascorbic acid 500 milliGRAM(s) Oral daily  buDESOnide    Inhalation Suspension 0.5 milliGRAM(s) Inhalation every 12 hours  chlorhexidine 2% Cloths 1 Application(s) Topical <User Schedule>  collagenase Ointment 1 Application(s) Topical daily  diphenhydramine 2%/zinc acetate 0.1% Cream 1 Application(s) Topical every 8 hours  fluconAZOLE IVPB 600 milliGRAM(s) IV Intermittent every 24 hours  hydrALAZINE 50 milliGRAM(s) Oral three times a day  insulin lispro (ADMELOG) corrective regimen sliding scale   SubCutaneous every 6 hours  insulin NPH human recombinant 4 Unit(s) SubCutaneous <User Schedule>  insulin NPH human recombinant 26 Unit(s) SubCutaneous <User Schedule>  magnesium oxide 400 milliGRAM(s) Oral every 8 hours  magnesium sulfate  IVPB 1 Gram(s) IV Intermittent once  methylPREDNISolone 8 milliGRAM(s) Oral daily  metoclopramide Injectable 5 milliGRAM(s) IV Push every 8 hours  metoprolol tartrate 12.5 milliGRAM(s) Enteral Tube two times a day  mexiletine 200 milliGRAM(s) Oral every 8 hours  multivitamin 1 Tablet(s) Oral daily  nystatin Powder 1 Application(s) Topical two times a day  pantoprazole  Injectable 40 milliGRAM(s) IV Push daily  vancomycin  IVPB 750 milliGRAM(s) IV Intermittent every 12 hours  vancomycin  IVPB        MEDICATIONS  (PRN):  acetaminophen    Suspension .. 650 milliGRAM(s) Oral every 6 hours PRN Temp greater or equal to 38C (100.4F), Mild Pain (1 - 3)  calamine/zinc oxide Lotion 1 Application(s) Topical every 8 hours PRN Itching      LABS:                        10.4   7.73  )-----------( Clumped    ( 11 Nov 2022 07:26 )             34.1     11-11    140  |  102  |  17  ----------------------------<  72  4.4   |  27  |  0.43<L>    Ca    9.2      11 Nov 2022 07:31  Phos  4.4     11-11  Mg     1.8     11-11    TPro  6.8  /  Alb  3.4  /  TBili  0.2  /  DBili  x   /  AST  17  /  ALT  16  /  AlkPhos  116  11-11              MICROBIOLOGY:     RADIOLOGY:  [ ] Reviewed and interpreted by me    Point of Care Ultrasound Findings:    PFT:    EKG:

## 2022-11-12 NOTE — PROGRESS NOTE ADULT - ASSESSMENT

## 2022-11-12 NOTE — PROGRESS NOTE ADULT - TIME BILLING
as above: stable--still awaiting floor bed--resp stable-speaking valve in place--ambulating daily/stronger over all  -ID f/up-resp stable--wound care f/up-TC continues- (she will always have secretions) due to TBM-s/p  trach 10/10-s/p  resp failure-s/p [id number] ABX-stable CV status-re- VEST rx in use  multifactorial dyspnea-resp failure-severe persistent asthma, TBM s/p tracheoplasty, s/p Aortic aneurysm repair, Bronchitis (proteus)-O2-keep 90%;TC  severe persistent asthma--medrol 8mg, singulair 10, duoneb q 6, budes .5 bid, tezspire 8/29-was due 9/29-next upon DC  TBM-s/p tracheoplasty--accapella, vest rx, mucomyst here 2 cc 10% q 8 (secretions)  s/p Aortic aneurysm repair--as per CTS staff care  AF-on eliquis rx               CV-improved hydralazine/lopressor/mexilitine   DVT R-IJ-on heparin rx  *****ID-s/p proteus bronchitis-s/p meropenum changed to ceftriaxone and back to meropenem/vanco- off of ABX as of 9/19--restart of ABX 9/27-meropenem/vanco-s/p  meropenem but continues vanco for MRSE sepsis (11/26 end date for vanco), plastics/ortho for elbow-proteus (?wash out)-vac in place-suppressive rx-bact/fluconazole  PT-OOB as able                             GI-TF as able--f/up LFTs-normal       ****ORTHO f/up--? additional wash out of elbow wound?; wound care f/up  **VC dysfunction--aspiration precautions-s/p trach 10/10-ENT f/up s/p laryngoscopy-? decannulation    Heme onc f/up colon ca  prog--fair                PT-to continue-more OOB     DC planning    Eulalio Allison MD-Pulmonary   [phone]

## 2022-11-12 NOTE — PROGRESS NOTE ADULT - SUBJECTIVE AND OBJECTIVE BOX
Patient seen and examined at the bedside.    Remained critically ill on continuous ICU monitoring.    OBJECTIVE:  Vital Signs Last 24 Hrs  T(C): 36.2 (12 Nov 2022 07:00), Max: 36.8 (11 Nov 2022 08:00)  T(F): 97.2 (12 Nov 2022 07:00), Max: 98.2 (11 Nov 2022 08:00)  HR: 72 (12 Nov 2022 07:00) (62 - 78)  BP: 124/47 (12 Nov 2022 07:00) (95/44 - 158/62)  BP(mean): 68 (12 Nov 2022 07:00) (63 - 103)  RR: 33 (12 Nov 2022 07:00) (21 - 39)  SpO2: 100% (12 Nov 2022 07:00) (96% - 100%)    Parameters below as of 12 Nov 2022 03:19  Patient On (Oxygen Delivery Method): tracheostomy collar  O2 Flow (L/min): 10  O2 Concentration (%): 40      Physical Exam:   General: Trach collar, NAD, Ambulates well  Neurology: Nonfocal, responds to commands. Moves all extremities  ENT/Neck: + trach c/d/i, neck supple, trachea midline   Respiratory: coarse BS b/l, rhonchi throughout, minimal secretions present  CV: S1S2, no murmurs        [x] Sinus Rhythm   Abdominal: Soft, NT, ND, colostomy in place (stoma intact)  Extremities: nonpitting edema, improved. Right elbow wound vac c/d/i. warm and well-perfused.  Skin: Dry dressing over right heel. No cyanosis                         Assessment:  71F PMH DM, COPD, chronic adrenal insufficiency on Prednisone, history of colorectal cancer s/p resection (colostomy bag), Hx of CAD, chronic AF on Eliquis, and tracheomalacia s/p multiple intubations, recent dx of R foot OM s/p debridement on antibiotics and presented to ED at [facility] this morning with epigastric pain, belching and central chest pain. Found on CTA to have type A aortic dissection and transferred to [facility] for surgical evaluation by Dr. Cabrera.     Ascending aortic dissection s/p type A dissection repair and Biobentall on 9/7   Respiratory failure s/p Trach 10/10/22  Hypovolemia   Post op respiratory failure   Acute blood loss anemia  Stress hyperglycemia   RIJ thrombus  MRSA PNA      Plan:   ***Neuro***  [x] Nonfocal  follows commands, continue to monitor  PT/OT progress as tolerated, ambulated with physical therapy  OOBTC      ***Cardiovascular***  Limited TTE on 10/1: EF 69%, Hyperdynamic left ventricular systolic function. There is hypokinesis of the mid-base inferior wall. Nml RV fxn.   CT chest on 9/28: No CT evidence of pulmonary embolism. Status post repair of ascending aortic dissection with a stable dissection flap originating from the aortic arch and extending into the infrarenal abdominal aorta,  B/l UE duplex on 10/5: As before, there is an occluded RIGHT IJ vein with thrombosis extending to brachiocephalic vein. Superficial thrombophlebitis of the LEFT cephalic vein.   Invasive hemodynamic monitoring, assess perfusion indices   SR / Hct 34.1/ Lactate 1.5  Hydralazine PO for BP management   Rate control with Lopressor and mexiletine   Wound VAC on rt elbow intact, changed yesterday morning  [x] AC Therapy with Eliquis 5 BID for Afib and IJ thrombus  Stopped daily blood work (T/Th/Sat)    ***Pulmonary***  Trach collar 10L/40% since 10/25, possible decannulation this week if secretions manageable  Respiratory failure s/p Trach #8 portex  10/10/22, per ENT inner cannula needs to be changed daily, trach sutures d/c'ed by ENT 10/17 , 40% trach collar since 10/25  Daily X-rays changed to (T/Th/Sat)  Titration of FiO2, follow SpO2, CXR, blood gases   Bronched 10/13  Continue duonebs  Suction as needed   Aggressive Pulmonary toilet  Downsized to 7 cuffless trach 11/3      ***GI***  [x] Diet: TFs Glucerna 1.2 @ goal 65ml/hr, tolerating without issues (when glucerna 1.5 back in stock will switch and decrease goal rate to 55ml/hr)  [x] Protonix    Reglan for gut motility       ***Renal***  Continue to monitor I/Os, BUN/Creatinine.   Replete lytes PRN      ***ID***  SCx on 10/4+Methicillin resistant Staphylococcus aureus, c/w IVPB Vancomycin, (plan for 6 week course in setting of valve surgery, 11/26 end date)  9/27 blood culture Neela Glabarata, on flucanazole (lifelong suppression)  BCx 10/13 NGTD, BC 10/21 and SCx NGTD  R elbow wound, S/p IR for elbow drainage 10/13 + Few Proteus mirabilis ESBL, Meropenem 7 day trial completed 10/19-> reordered 10/20 for reaccumulation of elbow bursitis-  10/22 plastics debrided R elbow eschar to subc tissue, ~8cc fluid aspirated and sent for culture. VAC changes done by PT   Joint Fluid Cx 10/22 - p. mirabilis s/p Meropenem for 8 day course  cont vanco (until 11/26 for 6 week course) and fluconazole for lifelong suppression  needs to be on isolation until vanco complete and then re culture 3 days later and if clear can be off isolation       ***Endocrine***  [x]  DM : HbA1c 9.4%                - [x] ISS  [x] NPH              - Need tight glycemic control to prevent wound infection.  Endocrine following, appreciate recommendations          Patient requires continuous monitoring with bedside rhythm monitoring, pulse oximetry monitoring, and continuous central venous and arterial pressure monitoring; and intermittent blood gas analysis. Care plan discussed with the ICU care team.   Patient remained critical, at risk for life threatening decompensation.    I have spent 30 minutes providing critical care management to this patient.    By signing my name below, I, Maria D'Amico, attest that this documentation has been prepared under the direction and in the presence of KIANA Powell   Electronically signed: Maria D'Amico, Scribe, 11-12-22 @ 07:33    I, KIANA Powell , personally performed the services described in this documentation. all medical record entries made by the rogers were at my direction and in my presence. I have reviewed the chart and agree that the record reflects my personal performance and is accurate and complete  Electronically signed: KIANA Powell  Patient seen and examined at the bedside.    Remained critically ill on continuous ICU monitoring.    OBJECTIVE:  Vital Signs Last 24 Hrs  T(C): 36.2 (12 Nov 2022 07:00), Max: 36.8 (11 Nov 2022 08:00)  T(F): 97.2 (12 Nov 2022 07:00), Max: 98.2 (11 Nov 2022 08:00)  HR: 72 (12 Nov 2022 07:00) (62 - 78)  BP: 124/47 (12 Nov 2022 07:00) (95/44 - 158/62)  BP(mean): 68 (12 Nov 2022 07:00) (63 - 103)  RR: 33 (12 Nov 2022 07:00) (21 - 39)  SpO2: 100% (12 Nov 2022 07:00) (96% - 100%)    Parameters below as of 12 Nov 2022 03:19  Patient On (Oxygen Delivery Method): tracheostomy collar  O2 Flow (L/min): 10  O2 Concentration (%): 40    Physical Exam:   General: Trach collar, NAD, Ambulates well  Neurology: Nonfocal, responds to commands. Moves all extremities  ENT/Neck: + trach c/d/i, neck supple, trachea midline   Respiratory: coarse BS b/l, rhonchi throughout, minimal secretions present  CV: S1S2, no murmurs        [x] Sinus Rhythm   Abdominal: Soft, NT, ND, colostomy in place (stoma intact)  Extremities: nonpitting edema, improved. Right elbow wound vac c/d/i. warm and well-perfused.  Skin: Dry dressing over right heel. No cyanosis                     Assessment:  71F PMH DM, COPD, chronic adrenal insufficiency on Prednisone, history of colorectal cancer s/p resection (colostomy bag), Hx of CAD, chronic AF on Eliquis, and tracheomalacia s/p multiple intubations, recent dx of R foot OM s/p debridement on antibiotics and presented to ED at [facility] this morning with epigastric pain, belching and central chest pain. Found on CTA to have type A aortic dissection and transferred to [facility] for surgical evaluation by Dr. Cabrera.     Ascending aortic dissection s/p type A dissection repair and Biobentall on 9/7   Respiratory failure s/p Trach 10/10/22  Hypovolemia   Post op respiratory failure   Acute blood loss anemia  Stress hyperglycemia   RIJ thrombus  MRSA PNA    Plan:   ***Neuro***  [x] Nonfocal  follows commands, continue to monitor  PT/OT progress as tolerated, ambulated with physical therapy  OOBTC    ***Cardiovascular***  Limited TTE on 10/1: EF 69%, Hyperdynamic left ventricular systolic function. There is hypokinesis of the mid-base inferior wall. Nml RV fxn.   CT chest on 9/28: No CT evidence of pulmonary embolism. Status post repair of ascending aortic dissection with a stable dissection flap originating from the aortic arch and extending into the infrarenal abdominal aorta,  B/l UE duplex on 10/5: As before, there is an occluded RIGHT IJ vein with thrombosis extending to brachiocephalic vein. Superficial thrombophlebitis of the LEFT cephalic vein.   Invasive hemodynamic monitoring, assess perfusion indices   SR / Hct 34.1/ Lactate 1.5  Hydralazine PO for BP management   Rate control with Lopressor and mexiletine   Wound VAC on rt elbow intact, changed yesterday morning  [x] AC Therapy with Eliquis 5 BID for Afib and IJ thrombus  Stopped daily blood work (T/Th/Sat)    ***Pulmonary***  Trach decannulated this AM  Daily X-rays changed to (T/Th/Sat)  Titration of FiO2, follow SpO2, CXR  Continue duonebs  Suction as needed   Aggressive Pulmonary toilet    ***GI***  [x] Diet: TFs Glucerna 1.2 @ goal 65ml/hr, tolerating without issues (when glucerna 1.5 back in stock will switch and decrease goal rate to 55ml/hr)  [x] Protonix    Reglan for gut motility     ***Renal***  Continue to monitor I/Os, BUN/Creatinine.   Replete lytes PRN    ***ID***  SCx on 10/4+Methicillin resistant Staphylococcus aureus, c/w IVPB Vancomycin, (plan for 6 week course in setting of valve surgery, 11/26 end date)  9/27 blood culture Neela Glabarata, on flucanazole (lifelong suppression)  BCx 10/13 NGTD, BC 10/21 and SCx NGTD  R elbow wound, S/p IR for elbow drainage 10/13 + Few Proteus mirabilis ESBL, Meropenem 7 day trial completed 10/19-> reordered 10/20 for reaccumulation of elbow bursitis-  10/22 plastics debrided R elbow eschar to subc tissue, ~8cc fluid aspirated and sent for culture. VAC changes done by PT   Joint Fluid Cx 10/22 - p. mirabilis s/p Meropenem for 8 day course  cont vanco (until 11/26 for 6 week course) and fluconazole for lifelong suppression  needs to be on isolation until vanco complete and then re culture 3 days later and if clear can be off isolation  Arrow catheter replaced today as it was unable to draw blood     ***Endocrine***  [x]  DM : HbA1c 9.4%                - [x] ISS  [x] NPH              - Need tight glycemic control to prevent wound infection.  Endocrine following, appreciate recommendations    Patient requires continuous monitoring with bedside rhythm monitoring, pulse oximetry monitoring, and continuous central venous and arterial pressure monitoring; and intermittent blood gas analysis. Care plan discussed with the ICU care team.   Patient remained critical, at risk for life threatening decompensation.    I have spent 35 minutes providing critical care management to this patient.    By signing my name below, I, Maria D'Amico, attest that this documentation has been prepared under the direction and in the presence of KIANA Powell   Electronically signed: Maria D'Amico, Scribe, 11-12-22 @ 07:33    I, KIANA Powell , personally performed the services described in this documentation. all medical record entries made by the rogers were at my direction and in my presence. I have reviewed the chart and agree that the record reflects my personal performance and is accurate and complete  Electronically signed: KIANA Powell

## 2022-11-13 LAB
ALBUMIN SERPL ELPH-MCNC: 3.2 G/DL — LOW (ref 3.3–5)
ALP SERPL-CCNC: 123 U/L — HIGH (ref 40–120)
ALT FLD-CCNC: 16 U/L — SIGNIFICANT CHANGE UP (ref 10–45)
ANION GAP SERPL CALC-SCNC: 13 MMOL/L — SIGNIFICANT CHANGE UP (ref 5–17)
AST SERPL-CCNC: 27 U/L — SIGNIFICANT CHANGE UP (ref 10–40)
BILIRUB SERPL-MCNC: 0.2 MG/DL — SIGNIFICANT CHANGE UP (ref 0.2–1.2)
BLD GP AB SCN SERPL QL: NEGATIVE — SIGNIFICANT CHANGE UP
BUN SERPL-MCNC: 18 MG/DL — SIGNIFICANT CHANGE UP (ref 7–23)
CALCIUM SERPL-MCNC: 9 MG/DL — SIGNIFICANT CHANGE UP (ref 8.4–10.5)
CHLORIDE SERPL-SCNC: 100 MMOL/L — SIGNIFICANT CHANGE UP (ref 96–108)
CO2 SERPL-SCNC: 23 MMOL/L — SIGNIFICANT CHANGE UP (ref 22–31)
CREAT SERPL-MCNC: 0.48 MG/DL — LOW (ref 0.5–1.3)
EGFR: 99 ML/MIN/1.73M2 — SIGNIFICANT CHANGE UP
GLUCOSE BLDC GLUCOMTR-MCNC: 164 MG/DL — HIGH (ref 70–99)
GLUCOSE BLDC GLUCOMTR-MCNC: 168 MG/DL — HIGH (ref 70–99)
GLUCOSE BLDC GLUCOMTR-MCNC: 307 MG/DL — HIGH (ref 70–99)
GLUCOSE SERPL-MCNC: 231 MG/DL — HIGH (ref 70–99)
HCT VFR BLD CALC: 35 % — SIGNIFICANT CHANGE UP (ref 34.5–45)
HGB BLD-MCNC: 10.6 G/DL — LOW (ref 11.5–15.5)
MAGNESIUM SERPL-MCNC: 1.8 MG/DL — SIGNIFICANT CHANGE UP (ref 1.6–2.6)
MCHC RBC-ENTMCNC: 23.3 PG — LOW (ref 27–34)
MCHC RBC-ENTMCNC: 30.3 GM/DL — LOW (ref 32–36)
MCV RBC AUTO: 77.1 FL — LOW (ref 80–100)
NRBC # BLD: 0 /100 WBCS — SIGNIFICANT CHANGE UP (ref 0–0)
PHOSPHATE SERPL-MCNC: 3.9 MG/DL — SIGNIFICANT CHANGE UP (ref 2.5–4.5)
PLATELET # BLD AUTO: 272 K/UL — SIGNIFICANT CHANGE UP (ref 150–400)
POTASSIUM SERPL-MCNC: 4.7 MMOL/L — SIGNIFICANT CHANGE UP (ref 3.5–5.3)
POTASSIUM SERPL-SCNC: 4.7 MMOL/L — SIGNIFICANT CHANGE UP (ref 3.5–5.3)
PROT SERPL-MCNC: 7 G/DL — SIGNIFICANT CHANGE UP (ref 6–8.3)
RBC # BLD: 4.54 M/UL — SIGNIFICANT CHANGE UP (ref 3.8–5.2)
RBC # FLD: 20.5 % — HIGH (ref 10.3–14.5)
RH IG SCN BLD-IMP: POSITIVE — SIGNIFICANT CHANGE UP
SARS-COV-2 RNA SPEC QL NAA+PROBE: SIGNIFICANT CHANGE UP
SODIUM SERPL-SCNC: 136 MMOL/L — SIGNIFICANT CHANGE UP (ref 135–145)
WBC # BLD: 12.47 K/UL — HIGH (ref 3.8–10.5)
WBC # FLD AUTO: 12.47 K/UL — HIGH (ref 3.8–10.5)

## 2022-11-13 PROCEDURE — 99291 CRITICAL CARE FIRST HOUR: CPT | Mod: 24

## 2022-11-13 PROCEDURE — 99232 SBSQ HOSP IP/OBS MODERATE 35: CPT

## 2022-11-13 PROCEDURE — 71045 X-RAY EXAM CHEST 1 VIEW: CPT | Mod: 26

## 2022-11-13 RX ORDER — METOPROLOL TARTRATE 50 MG
2.5 TABLET ORAL ONCE
Refills: 0 | Status: COMPLETED | OUTPATIENT
Start: 2022-11-13 | End: 2022-11-13

## 2022-11-13 RX ORDER — HUMAN INSULIN 100 [IU]/ML
28 INJECTION, SUSPENSION SUBCUTANEOUS
Refills: 0 | Status: DISCONTINUED | OUTPATIENT
Start: 2022-11-14 | End: 2022-11-14

## 2022-11-13 RX ADMIN — Medication 500 MILLIGRAM(S): at 12:49

## 2022-11-13 RX ADMIN — MAGNESIUM OXIDE 400 MG ORAL TABLET 400 MILLIGRAM(S): 241.3 TABLET ORAL at 05:56

## 2022-11-13 RX ADMIN — APIXABAN 5 MILLIGRAM(S): 2.5 TABLET, FILM COATED ORAL at 05:57

## 2022-11-13 RX ADMIN — Medication 50 MILLIGRAM(S): at 13:27

## 2022-11-13 RX ADMIN — FLUCONAZOLE 150 MILLIGRAM(S): 150 TABLET ORAL at 21:43

## 2022-11-13 RX ADMIN — Medication 2.5 MILLIGRAM(S): at 23:48

## 2022-11-13 RX ADMIN — Medication 2: at 05:58

## 2022-11-13 RX ADMIN — Medication 0.5 MILLIGRAM(S): at 05:23

## 2022-11-13 RX ADMIN — Medication 8: at 13:12

## 2022-11-13 RX ADMIN — Medication 3 MILLILITER(S): at 05:23

## 2022-11-13 RX ADMIN — Medication 2: at 17:30

## 2022-11-13 RX ADMIN — Medication 4 MILLILITER(S): at 17:25

## 2022-11-13 RX ADMIN — Medication 5 MILLIGRAM(S): at 05:56

## 2022-11-13 RX ADMIN — Medication 12.5 MILLIGRAM(S): at 17:42

## 2022-11-13 RX ADMIN — MAGNESIUM OXIDE 400 MG ORAL TABLET 400 MILLIGRAM(S): 241.3 TABLET ORAL at 21:44

## 2022-11-13 RX ADMIN — PANTOPRAZOLE SODIUM 40 MILLIGRAM(S): 20 TABLET, DELAYED RELEASE ORAL at 13:27

## 2022-11-13 RX ADMIN — NYSTATIN CREAM 1 APPLICATION(S): 100000 CREAM TOPICAL at 05:56

## 2022-11-13 RX ADMIN — Medication 50 MILLIGRAM(S): at 05:57

## 2022-11-13 RX ADMIN — Medication 5 MILLIGRAM(S): at 13:27

## 2022-11-13 RX ADMIN — Medication 12.5 MILLIGRAM(S): at 05:57

## 2022-11-13 RX ADMIN — HUMAN INSULIN 26 UNIT(S): 100 INJECTION, SUSPENSION SUBCUTANEOUS at 05:59

## 2022-11-13 RX ADMIN — Medication 4 MILLILITER(S): at 05:22

## 2022-11-13 RX ADMIN — Medication 3 MILLILITER(S): at 23:04

## 2022-11-13 RX ADMIN — Medication 1 TABLET(S): at 13:27

## 2022-11-13 RX ADMIN — Medication 50 MILLIGRAM(S): at 21:44

## 2022-11-13 RX ADMIN — Medication 0.5 MILLIGRAM(S): at 17:25

## 2022-11-13 RX ADMIN — APIXABAN 5 MILLIGRAM(S): 2.5 TABLET, FILM COATED ORAL at 17:42

## 2022-11-13 RX ADMIN — NYSTATIN CREAM 1 APPLICATION(S): 100000 CREAM TOPICAL at 17:43

## 2022-11-13 RX ADMIN — Medication 250 MILLIGRAM(S): at 21:44

## 2022-11-13 RX ADMIN — MEXILETINE HYDROCHLORIDE 200 MILLIGRAM(S): 150 CAPSULE ORAL at 05:56

## 2022-11-13 RX ADMIN — MAGNESIUM OXIDE 400 MG ORAL TABLET 400 MILLIGRAM(S): 241.3 TABLET ORAL at 13:27

## 2022-11-13 RX ADMIN — Medication 8 MILLIGRAM(S): at 05:56

## 2022-11-13 RX ADMIN — Medication 250 MILLIGRAM(S): at 11:09

## 2022-11-13 RX ADMIN — Medication 5 MILLIGRAM(S): at 21:43

## 2022-11-13 RX ADMIN — Medication 650 MILLIGRAM(S): at 00:15

## 2022-11-13 RX ADMIN — MEXILETINE HYDROCHLORIDE 200 MILLIGRAM(S): 150 CAPSULE ORAL at 13:27

## 2022-11-13 RX ADMIN — Medication 3 MILLILITER(S): at 11:16

## 2022-11-13 RX ADMIN — Medication 1 APPLICATION(S): at 12:49

## 2022-11-13 RX ADMIN — MEXILETINE HYDROCHLORIDE 200 MILLIGRAM(S): 150 CAPSULE ORAL at 21:44

## 2022-11-13 RX ADMIN — Medication 1 APPLICATION(S): at 23:52

## 2022-11-13 RX ADMIN — Medication 3 MILLILITER(S): at 17:25

## 2022-11-13 RX ADMIN — CHLORHEXIDINE GLUCONATE 1 APPLICATION(S): 213 SOLUTION TOPICAL at 05:55

## 2022-11-13 NOTE — PROGRESS NOTE ADULT - TIME BILLING
as above: remains stable--still awaiting floor bed--resp stable-speaking valve in place--ambulating daily/stronger over all  -ID f/up-resp stable--wound care f/up-TC continues- (she will always have secretions) due to TBM-s/p  trach 10/10-s/p  resp failure-s/p [id number] ABX-stable CV status-re- VEST rx in use  multifactorial dyspnea-resp failure-severe persistent asthma, TBM s/p tracheoplasty, s/p Aortic aneurysm repair, Bronchitis (proteus)-O2-keep 90%;TC  severe persistent asthma--medrol 8mg, singulair 10, duoneb q 6, budes .5 bid, tezspire 8/29-was due 9/29-next upon DC  TBM-s/p tracheoplasty--accapella, vest rx, mucomyst here 2 cc 10% q 8 (secretions)  s/p Aortic aneurysm repair--as per CTS staff care  AF-on eliquis rx               CV-improved hydralazine/lopressor/mexilitine   DVT R-IJ-on heparin rx  *****ID-s/p proteus bronchitis-s/p meropenum changed to ceftriaxone and back to meropenem/vanco- off of ABX as of 9/19--restart of ABX 9/27-meropenem/vanco-s/p  meropenem but continues vanco for MRSE sepsis (11/26 end date for vanco), plastics/ortho for elbow-proteus (?wash out)-vac in place-suppressive rx-bact/fluconazole  PT-OOB as able                             GI-TF as able--f/up LFTs-normal       ****ORTHO f/up--? additional wash out of elbow wound?; wound care f/up  **VC dysfunction--aspiration precautions-s/p trach 10/10-ENT f/up s/p laryngoscopy-? decannulation    Heme onc f/up colon ca  prog--fair                PT-to continue-more OOB     DC planning    Eulalio Allison MD-Pulmonary   [phone]

## 2022-11-13 NOTE — PROGRESS NOTE ADULT - NS CRITICAL PANP GEN_ALL_CORE NUM
40
30
35
30
35
40
30
30
35
40
30
35
30
30
35
45
30
40
45
30
35
40

## 2022-11-13 NOTE — PROGRESS NOTE ADULT - NSSCRIBESTATEMENT_GEN_ALL_CORE
ACP (NP/PA)

## 2022-11-13 NOTE — PROGRESS NOTE ADULT - ACP WITH SCRIBE
I personally performed the service described in the documentation  recorded by the scribe in my presence, and it accurately and completely records my words and actions.

## 2022-11-13 NOTE — PROGRESS NOTE ADULT - SUBJECTIVE AND OBJECTIVE BOX
Patient seen and examined at the bedside.    Remained critically ill on continuous ICU monitoring.    OBJECTIVE:  Vital Signs Last 24 Hrs  T(C): 36.9 (13 Nov 2022 04:00), Max: 36.9 (13 Nov 2022 04:00)  T(F): 98.4 (13 Nov 2022 04:00), Max: 98.4 (13 Nov 2022 04:00)  HR: 66 (13 Nov 2022 07:00) (66 - 88)  BP: 129/60 (13 Nov 2022 07:00) (94/40 - 173/104)  BP(mean): 87 (13 Nov 2022 07:00) (55 - 119)  RR: 22 (13 Nov 2022 07:00) (18 - 42)  SpO2: 100% (13 Nov 2022 07:00) (97% - 100%)    Parameters below as of 13 Nov 2022 08:00  Patient On (Oxygen Delivery Method): nasal cannula  O2 Flow (L/min): 2        Physical Exam:   General: Trach collar, NAD, Ambulates well  Neurology: Nonfocal, responds to commands. Moves all extremities  ENT/Neck: + trach c/d/i, neck supple, trachea midline   Respiratory: coarse BS b/l, rhonchi throughout, minimal secretions present  CV: S1S2, no murmurs        [x] Sinus Rhythm   Abdominal: Soft, NT, ND, colostomy in place (stoma intact)  Extremities: nonpitting edema, improved. Right elbow wound vac c/d/i. warm and well-perfused.  Skin: Dry dressing over right heel. No cyanosis                        Assessment:  71F PMH DM, COPD, chronic adrenal insufficiency on Prednisone, history of colorectal cancer s/p resection (colostomy bag), Hx of CAD, chronic AF on Eliquis, and tracheomalacia s/p multiple intubations, recent dx of R foot OM s/p debridement on antibiotics and presented to ED at [facility] this morning with epigastric pain, belching and central chest pain. Found on CTA to have type A aortic dissection and transferred to [facility] for surgical evaluation by Dr. Cabrera.     Ascending aortic dissection s/p type A dissection repair and Biobentall on 9/7   Respiratory failure s/p Trach 10/10/22  Hypovolemia   Post op respiratory failure   Acute blood loss anemia  Lactic Acidosis   Leukocytosis   Stress hyperglycemia   RIJ thrombus  MRSA PNA        Plan:   ***Neuro***  [x] Nonfocal  follows commands, continue to monitor  PT/OT progress as tolerated, ambulated with physical therapy  OOBTC      ***Cardiovascular***  Limited TTE on 10/1: EF 69%, Hyperdynamic left ventricular systolic function. There is hypokinesis of the mid-base inferior wall. Nml RV fxn.   CT chest on 9/28: No CT evidence of pulmonary embolism. Status post repair of ascending aortic dissection with a stable dissection flap originating from the aortic arch and extending into the infrarenal abdominal aorta,  B/l UE duplex on 10/5: As before, there is an occluded RIGHT IJ vein with thrombosis extending to brachiocephalic vein. Superficial thrombophlebitis of the LEFT cephalic vein.   Invasive hemodynamic monitoring, assess perfusion indices   SR / Hct 35.0/ Lactate 2.4  Hydralazine PO for BP management   Rate control with Lopressor and mexiletine   Wound VAC on rt elbow intact, changed 11/11  [x] AC Therapy with Eliquis 5 BID for Afib and IJ thrombus  Stopped daily blood work (T/Th/Sat)    ***Pulmonary***  Trach decannulated yesterday AM  Daily X-rays changed to (T/Th/Sat)  [x] 2L NC  Encourage incentive spirometry, continue pulse ox monitoring, follow ABGs   Continue duonebs  Suction as needed   Aggressive Pulmonary toilet                   ***GI***  [x] Diet: TFs Glucerna 1.2 @ goal 65ml/hr, tolerating without issues (when glucerna 1.5 back in stock will switch and decrease goal rate to 55ml/hr)  [x] Protonix    Reglan for gut motility       ***Renal***  Continue to monitor I/Os, BUN/Creatinine.   Replete lytes PRN      ***ID***  SCx on 10/4+Methicillin resistant Staphylococcus aureus, c/w IVPB Vancomycin, (plan for 6 week course in setting of valve surgery, 11/26 end date)  9/27 blood culture Neela Glabarata, on flucanazole (lifelong suppression)  BCx 10/13 NGTD, BC 10/21 and SCx NGTD  R elbow wound, S/p IR for elbow drainage 10/13 + Few Proteus mirabilis ESBL, Meropenem 7 day trial completed 10/19-> reordered 10/20 for reaccumulation of elbow bursitis-  10/22 plastics debrided R elbow eschar to subc tissue, ~8cc fluid aspirated and sent for culture. VAC changes done by PT   Joint Fluid Cx 10/22 - p. mirabilis s/p Meropenem for 8 day course  cont vanco (until 11/26 for 6 week course) and fluconazole for lifelong suppression  needs to be on isolation until vanco complete and then re culture 3 days later and if clear can be off isolation  Arrow catheter replaced today as it was unable to draw blood      ***Endocrine***  [x]  DM : HbA1c 9.4%                - [x] ISS  [x] NPH              - Need tight glycemic control to prevent wound infection.  Endocrine following, appreciate recommendations        Patient requires continuous monitoring with bedside rhythm monitoring, pulse oximetry monitoring, and continuous central venous and arterial pressure monitoring; and intermittent blood gas analysis. Care plan discussed with the ICU care team.   Patient remained critical, at risk for life threatening decompensation.    I have spent 30 minutes providing critical care management to this patient.    By signing my name below, I, Kieran Plascencia, attest that this documentation has been prepared under the direction and in the presence of KIANA Powell   Electronically signed: Georgi Cordero, 11-13-22 @ 08:01    I, KIANA Powell, personally performed the services described in this documentation. all medical record entries made by the marcyibronaldo were at my direction and in my presence. I have reviewed the chart and agree that the record reflects my personal performance and is accurate and complete  Electronically signed: KIANA Powell  Patient seen and examined at the bedside.    Remained critically ill on continuous ICU monitoring.    OBJECTIVE:  Vital Signs Last 24 Hrs  T(C): 36.9 (13 Nov 2022 04:00), Max: 36.9 (13 Nov 2022 04:00)  T(F): 98.4 (13 Nov 2022 04:00), Max: 98.4 (13 Nov 2022 04:00)  HR: 66 (13 Nov 2022 07:00) (66 - 88)  BP: 129/60 (13 Nov 2022 07:00) (94/40 - 173/104)  BP(mean): 87 (13 Nov 2022 07:00) (55 - 119)  RR: 22 (13 Nov 2022 07:00) (18 - 42)  SpO2: 100% (13 Nov 2022 07:00) (97% - 100%)    Parameters below as of 13 Nov 2022 08:00  Patient On (Oxygen Delivery Method): nasal cannula  O2 Flow (L/min): 2    Physical Exam:   General: Trach collar, NAD, Ambulates well  Neurology: Nonfocal, responds to commands. Moves all extremities  ENT/Neck: + trach c/d/i, neck supple, trachea midline   Respiratory: coarse BS b/l, rhonchi throughout, minimal secretions present  CV: S1S2, no murmurs        [x] Sinus Rhythm   Abdominal: Soft, NT, ND, colostomy in place (stoma intact)  Extremities: nonpitting edema, improved. Right elbow wound vac c/d/i. warm and well-perfused.  Skin: Dry dressing over right heel. No cyanosis                    Assessment:  71F PMH DM, COPD, chronic adrenal insufficiency on Prednisone, history of colorectal cancer s/p resection (colostomy bag), Hx of CAD, chronic AF on Eliquis, and tracheomalacia s/p multiple intubations, recent dx of R foot OM s/p debridement on antibiotics and presented to ED at [facility] this morning with epigastric pain, belching and central chest pain. Found on CTA to have type A aortic dissection and transferred to [facility] for surgical evaluation by Dr. Cabrera.     Ascending aortic dissection s/p type A dissection repair and Biobentall on 9/7   Respiratory failure s/p Trach 10/10/22  Hypovolemia   Post op respiratory failure   Acute blood loss anemia  Lactic Acidosis   Leukocytosis   Stress hyperglycemia   RIJ thrombus  MRSA PNA    Plan:   ***Neuro***  [x] Nonfocal  follows commands, continue to monitor  PT/OT progress as tolerated, ambulated with physical therapy  OOBTC    ***Cardiovascular***  Limited TTE on 10/1: EF 69%, Hyperdynamic left ventricular systolic function. There is hypokinesis of the mid-base inferior wall. Nml RV fxn.   CT chest on 9/28: No CT evidence of pulmonary embolism. Status post repair of ascending aortic dissection with a stable dissection flap originating from the aortic arch and extending into the infrarenal abdominal aorta,  B/l UE duplex on 10/5: As before, there is an occluded RIGHT IJ vein with thrombosis extending to brachiocephalic vein. Superficial thrombophlebitis of the LEFT cephalic vein.   Invasive hemodynamic monitoring, assess perfusion indices   SR / Hct 35.0/ Lactate 2.4  Hydralazine PO for BP management   Rate control with Lopressor and mexiletine   Wound VAC on rt elbow intact, changed 11/11  [x] AC Therapy with Eliquis 5 BID for Afib and IJ thrombus  Stopped daily blood work (T/Th/Sat)    ***Pulmonary***  Trach decannulated yesterday AM  Daily X-rays changed to (T/Th/Sat)  [x] 2L NC  Encourage incentive spirometry, continue pulse ox monitoring, follow ABGs   Continue duonebs  Suction as needed   Aggressive Pulmonary toilet        ***GI***  [x] Diet: TFs Glucerna 1.2 @ goal 65ml/hr, tolerating without issues (when glucerna 1.5 back in stock will switch and decrease goal rate to 55ml/hr)  [x] Protonix    Reglan for gut motility     ***Renal***  Continue to monitor I/Os, BUN/Creatinine.   Replete lytes PRN    ***ID***  SCx on 10/4+Methicillin resistant Staphylococcus aureus, c/w IVPB Vancomycin, (plan for 6 week course in setting of valve surgery, 11/26 end date)  9/27 blood culture Neela Glabarata, on flucanazole (lifelong suppression)  BCx 10/13 NGTD, BC 10/21 and SCx NGTD  R elbow wound, S/p IR for elbow drainage 10/13 + Few Proteus mirabilis ESBL, Meropenem 7 day trial completed 10/19-> reordered 10/20 for reaccumulation of elbow bursitis-  10/22 plastics debrided R elbow eschar to subc tissue, ~8cc fluid aspirated and sent for culture. VAC changes done by PT   Joint Fluid Cx 10/22 - p. mirabilis s/p Meropenem for 8 day course  cont vanco (until 11/26 for 6 week course) and fluconazole for lifelong suppression  needs to be on isolation until vanco complete and then re culture 3 days later and if clear can be off isolation  Arrow catheter replaced yesterday    ***Endocrine***  [x]  DM : HbA1c 9.4%                - [x] ISS  [x] NPH              - Need tight glycemic control to prevent wound infection.  Endocrine following, appreciate recommendations    Patient requires continuous monitoring with bedside rhythm monitoring, pulse oximetry monitoring, and continuous central venous and arterial pressure monitoring; and intermittent blood gas analysis. Care plan discussed with the ICU care team.   Patient remained critical, at risk for life threatening decompensation.    I have spent 30 minutes providing critical care management to this patient.    By signing my name below, I, Kieran Plascencia, attest that this documentation has been prepared under the direction and in the presence of KIANA Powell   Electronically signed: Rogers Cordero, 11-13-22 @ 08:01    I, KIANA Powell, personally performed the services described in this documentation. all medical record entries made by the rogers were at my direction and in my presence. I have reviewed the chart and agree that the record reflects my personal performance and is accurate and complete  Electronically signed: KIANA Powell

## 2022-11-13 NOTE — PROGRESS NOTE ADULT - CRITICAL CARE SERVICES PROVIDED
Patient is critically ill, requiring critical care services.

## 2022-11-13 NOTE — PROGRESS NOTE ADULT - ASSESSMENT

## 2022-11-13 NOTE — CHART NOTE - NSCHARTNOTEFT_GEN_A_CORE
Age: 72y    Gender: Female    POCT Blood Glucose:156 (11-12-22 @ 18:00)    307 mg/dL (11-13-22 @ 13:07)  164 mg/dL (11-13-22 @ 05:58)  222 mg/dL (11-12-22 @ 23:12)  156 mg/dL (11-12-22 @ 17:49)      eMAR:  insulin lispro (ADMELOG) corrective regimen sliding scale   8 Unit(s) SubCutaneous (11-13-22 @ 13:12)   2 Unit(s) SubCutaneous (11-13-22 @ 05:58)   4 Unit(s) SubCutaneous (11-12-22 @ 23:14)   2  SubCutaneous (11-12-22 @ 18:12)    insulin NPH human recombinant   4 Unit(s) SubCutaneous (11-12-22 @ 23:15)    insulin NPH human recombinant   26 Unit(s) SubCutaneous (11-13-22 @ 05:59)    methylPREDNISolone   8 milliGRAM(s) Oral (11-13-22 @ 05:56)      Noted noon  , per D/w RN pt refused repeat FS to confirm value. Pt remains on TF. NGT replaced yesterday and TF Held yesterday from 12noon to 1500 per flow sheet. BG at goal yesterday, BG trending higher 200-300 today    0000 C/w NPH 4 units   0600 Adjust NPH 28 units  Give 50% of dose if BG less than 120mg/dl. DO NOT HOLD IF RECEIVING TF  1200 hold NPH for now   1800 hold NPH for now  -If TFs off after NPH is given please start D5 infusion at TF rate to prevent rebound hypoglycemia.   -C/w Admelog moderate correction scale q6h for now    discussed with RN

## 2022-11-13 NOTE — PROGRESS NOTE ADULT - ACP NAME
Alysa Tejada
Alysa Tejada NP
Bharti Barrios
Radha Nichols
no scribe
Bharti Barrios
Kieran Plascencia
Radha Nichols
Alysa Tejada NP
Alysa johnson Np
no scribe
Radha Nichols
Alysa johnson NP
Radha Nichols
Sarath BRUNO
Sarath BRUNO
Vitor BRUNO
Radha Nichols
Radha Nichols
no scribe
Radha Nichols
Vitor BRUNO

## 2022-11-13 NOTE — PROGRESS NOTE ADULT - SUBJECTIVE AND OBJECTIVE BOX
CHIEF COMPLAINT: f/up sob, chronic resp failure, TBM, severe persistent asthma, VC dysfunction, Type A aortic dissection s/p repair w/modified "Bentall procedure and hemiarch replacement 9/6-trached--on TC-sleepy at present    Interval Events: TC continues/no new events    REVIEW OF SYSTEMS:  Constitutional: No fevers or chills. No weight loss. No fatigue or generalized malaise.  Eyes: No itching or discharge from the eyes  ENT: No ear pain. No ear discharge. No nasal congestion. No post nasal drip. No epistaxis. No throat pain. No sore throat. No difficulty swallowing.   CV: No chest pain. No palpitations. No lightheadedness or dizziness.   Resp: No dyspnea at rest. No dyspnea on exertion. No orthopnea. No wheezing. No cough. No stridor. No sputum production. No chest pain with respiration.  GI: No nausea. No vomiting. No diarrhea.  MSK: No joint pain or pain in any extremities  Integumentary: No skin lesions. No pedal edema.  Neurological: No gross motor weakness. No sensory changes.  [ ] All other systems negative  [+ ] Unable to assess ROS because __sleepy______    OBJECTIVE:  ICU Vital Signs Last 24 Hrs  T(C): 36.9 (13 Nov 2022 04:00), Max: 36.9 (13 Nov 2022 04:00)  T(F): 98.4 (13 Nov 2022 04:00), Max: 98.4 (13 Nov 2022 04:00)  HR: 78 (13 Nov 2022 04:00) (67 - 88)  BP: 98/55 (13 Nov 2022 04:00) (94/40 - 173/104)  BP(mean): 73 (13 Nov 2022 04:00) (55 - 119)  ABP: --  ABP(mean): --  RR: 30 (13 Nov 2022 04:00) (18 - 44)  SpO2: 98% (13 Nov 2022 04:00) (98% - 100%)    O2 Parameters below as of 13 Nov 2022 04:00  Patient On (Oxygen Delivery Method): nasal cannula  O2 Flow (L/min): 2  O2 Concentration (%): 28          11-11 @ 07:01  -  11-12 @ 07:00  --------------------------------------------------------  IN: 2140 mL / OUT: 1400 mL / NET: 740 mL    11-12 @ 07:01  - 11-13 @ 04:55  --------------------------------------------------------  IN: 2320 mL / OUT: 1200 mL / NET: 1120 mL      CAPILLARY BLOOD GLUCOSE  156 (12 Nov 2022 18:00)      POCT Blood Glucose.: 222 mg/dL (12 Nov 2022 23:12)      PHYSICAL EXAM: NAD in bed on TC  General: arrousable   HEENT: Atraumatic, normocephalic.                 Mallampatti Grade 3                 No nasal congestion.                No tonsillar or pharyngeal exudates.  Lymph Nodes: No palpable lymphadenopathy  Neck: No JVD. No carotid bruit.   Respiratory: abnormal chest expansion                         Normal percussion                         Normal and equal air entry                         No wheeze, rhonchi or rales.  Cardiovascular: S1 S2 normal. No murmurs, rubs or gallops.   Abdomen: Soft, non-tender, non-distended. No organomegaly. Normoactive bowel sounds.  Extremities: Warm to touch. Peripheral pulse palpable. No pedal edema.   Skin: No rashes or skin lesions  Neurological: Motor and sensory examination equal and normal in all four extremities.  Psychiatry: Appropriate mood and affect.    HOSPITAL MEDICATIONS:  MEDICATIONS  (STANDING):  acetylcysteine 10%  Inhalation 4 milliLiter(s) Inhalation every 12 hours  albuterol/ipratropium for Nebulization 3 milliLiter(s) Nebulizer every 6 hours  apixaban 5 milliGRAM(s) Enteral Tube every 12 hours  ascorbic acid 500 milliGRAM(s) Oral daily  buDESOnide    Inhalation Suspension 0.5 milliGRAM(s) Inhalation every 12 hours  chlorhexidine 2% Cloths 1 Application(s) Topical <User Schedule>  collagenase Ointment 1 Application(s) Topical daily  diphenhydramine 2%/zinc acetate 0.1% Cream 1 Application(s) Topical every 8 hours  fluconAZOLE IVPB 600 milliGRAM(s) IV Intermittent every 24 hours  hydrALAZINE 50 milliGRAM(s) Oral three times a day  insulin lispro (ADMELOG) corrective regimen sliding scale   SubCutaneous every 6 hours  insulin NPH human recombinant 4 Unit(s) SubCutaneous <User Schedule>  insulin NPH human recombinant 26 Unit(s) SubCutaneous <User Schedule>  magnesium oxide 400 milliGRAM(s) Oral every 8 hours  methylPREDNISolone 8 milliGRAM(s) Oral daily  metoclopramide Injectable 5 milliGRAM(s) IV Push every 8 hours  metoprolol tartrate 12.5 milliGRAM(s) Oral two times a day  mexiletine 200 milliGRAM(s) Oral every 8 hours  multivitamin 1 Tablet(s) Oral daily  nystatin Powder 1 Application(s) Topical two times a day  pantoprazole  Injectable 40 milliGRAM(s) IV Push daily  vancomycin  IVPB 750 milliGRAM(s) IV Intermittent every 12 hours  vancomycin  IVPB        MEDICATIONS  (PRN):  acetaminophen    Suspension .. 650 milliGRAM(s) Oral every 6 hours PRN Temp greater or equal to 38C (100.4F), Mild Pain (1 - 3)  calamine/zinc oxide Lotion 1 Application(s) Topical every 8 hours PRN Itching      LABS:                        10.6   12.47 )-----------( 272      ( 13 Nov 2022 00:17 )             35.0     11-13    136  |  100  |  18  ----------------------------<  231<H>  4.7   |  23  |  0.48<L>    Ca    9.0      13 Nov 2022 00:17  Phos  3.9     11-13  Mg     1.8     11-13    TPro  7.0  /  Alb  3.2<L>  /  TBili  0.2  /  DBili  x   /  AST  27  /  ALT  16  /  AlkPhos  123<H>  11-13          Venous Blood Gas:  11-12 @ 13:25  7.44/47/32/32/53.6  VBG Lactate: 2.4      MICROBIOLOGY:     RADIOLOGY:  [ ] Reviewed and interpreted by me    Point of Care Ultrasound Findings:    PFT:    EKG:

## 2022-11-13 NOTE — PROGRESS NOTE ADULT - ACP SCRIBE NAME
no scribe
Emelia Matteo
Megan Guerra
no scribe
russ
Bharti Barrios
Kieran Plascencia
Kieran Plascencia
Valentino Garcia
Maria D'Amico
Maria D'amico
Kieran Plascencia
Maria D'Amico
Maria D'Amico
ruiz Bailey
Bharti Barrios
Bharti Barrios
Maria D'Amico
myself
russ
Kieran Plascencia
Kieran Plascencia
None
Kieran Plascencia

## 2022-11-14 DIAGNOSIS — R78.81 BACTEREMIA: ICD-10-CM

## 2022-11-14 LAB
GLUCOSE BLDC GLUCOMTR-MCNC: 136 MG/DL — HIGH (ref 70–99)
GLUCOSE BLDC GLUCOMTR-MCNC: 151 MG/DL — HIGH (ref 70–99)
GLUCOSE BLDC GLUCOMTR-MCNC: 168 MG/DL — HIGH (ref 70–99)
GLUCOSE BLDC GLUCOMTR-MCNC: 198 MG/DL — HIGH (ref 70–99)
GLUCOSE BLDC GLUCOMTR-MCNC: 305 MG/DL — HIGH (ref 70–99)

## 2022-11-14 PROCEDURE — 99232 SBSQ HOSP IP/OBS MODERATE 35: CPT

## 2022-11-14 RX ORDER — SODIUM CHLORIDE 9 MG/ML
3 INJECTION INTRAMUSCULAR; INTRAVENOUS; SUBCUTANEOUS EVERY 8 HOURS
Refills: 0 | Status: DISCONTINUED | OUTPATIENT
Start: 2022-11-14 | End: 2022-11-22

## 2022-11-14 RX ORDER — ONDANSETRON 8 MG/1
4 TABLET, FILM COATED ORAL ONCE
Refills: 0 | Status: COMPLETED | OUTPATIENT
Start: 2022-11-14 | End: 2022-11-14

## 2022-11-14 RX ORDER — HUMAN INSULIN 100 [IU]/ML
32 INJECTION, SUSPENSION SUBCUTANEOUS
Refills: 0 | Status: DISCONTINUED | OUTPATIENT
Start: 2022-11-15 | End: 2022-11-15

## 2022-11-14 RX ADMIN — Medication 50 MILLIGRAM(S): at 05:36

## 2022-11-14 RX ADMIN — Medication 3 MILLILITER(S): at 11:01

## 2022-11-14 RX ADMIN — Medication 250 MILLIGRAM(S): at 23:52

## 2022-11-14 RX ADMIN — MEXILETINE HYDROCHLORIDE 200 MILLIGRAM(S): 150 CAPSULE ORAL at 05:38

## 2022-11-14 RX ADMIN — MAGNESIUM OXIDE 400 MG ORAL TABLET 400 MILLIGRAM(S): 241.3 TABLET ORAL at 13:56

## 2022-11-14 RX ADMIN — Medication 8: at 13:55

## 2022-11-14 RX ADMIN — Medication 500 MILLIGRAM(S): at 13:57

## 2022-11-14 RX ADMIN — Medication 5 MILLIGRAM(S): at 13:57

## 2022-11-14 RX ADMIN — MAGNESIUM OXIDE 400 MG ORAL TABLET 400 MILLIGRAM(S): 241.3 TABLET ORAL at 22:27

## 2022-11-14 RX ADMIN — Medication 3 MILLILITER(S): at 18:10

## 2022-11-14 RX ADMIN — APIXABAN 5 MILLIGRAM(S): 2.5 TABLET, FILM COATED ORAL at 05:37

## 2022-11-14 RX ADMIN — APIXABAN 5 MILLIGRAM(S): 2.5 TABLET, FILM COATED ORAL at 18:09

## 2022-11-14 RX ADMIN — Medication 12.5 MILLIGRAM(S): at 05:37

## 2022-11-14 RX ADMIN — Medication 50 MILLIGRAM(S): at 22:27

## 2022-11-14 RX ADMIN — NYSTATIN CREAM 1 APPLICATION(S): 100000 CREAM TOPICAL at 05:36

## 2022-11-14 RX ADMIN — Medication 0.5 MILLIGRAM(S): at 18:10

## 2022-11-14 RX ADMIN — Medication 0.5 MILLIGRAM(S): at 05:08

## 2022-11-14 RX ADMIN — HUMAN INSULIN 4 UNIT(S): 100 INJECTION, SUSPENSION SUBCUTANEOUS at 00:25

## 2022-11-14 RX ADMIN — Medication 250 MILLIGRAM(S): at 10:19

## 2022-11-14 RX ADMIN — Medication 3 MILLILITER(S): at 05:08

## 2022-11-14 RX ADMIN — Medication 1 APPLICATION(S): at 13:56

## 2022-11-14 RX ADMIN — Medication 5 MILLIGRAM(S): at 22:28

## 2022-11-14 RX ADMIN — FLUCONAZOLE 150 MILLIGRAM(S): 150 TABLET ORAL at 21:10

## 2022-11-14 RX ADMIN — HUMAN INSULIN 28 UNIT(S): 100 INJECTION, SUSPENSION SUBCUTANEOUS at 05:39

## 2022-11-14 RX ADMIN — Medication 2: at 05:41

## 2022-11-14 RX ADMIN — MAGNESIUM OXIDE 400 MG ORAL TABLET 400 MILLIGRAM(S): 241.3 TABLET ORAL at 05:37

## 2022-11-14 RX ADMIN — Medication 1 APPLICATION(S): at 13:21

## 2022-11-14 RX ADMIN — MEXILETINE HYDROCHLORIDE 200 MILLIGRAM(S): 150 CAPSULE ORAL at 13:56

## 2022-11-14 RX ADMIN — SODIUM CHLORIDE 3 MILLILITER(S): 9 INJECTION INTRAMUSCULAR; INTRAVENOUS; SUBCUTANEOUS at 22:11

## 2022-11-14 RX ADMIN — MEXILETINE HYDROCHLORIDE 200 MILLIGRAM(S): 150 CAPSULE ORAL at 22:27

## 2022-11-14 RX ADMIN — Medication 12.5 MILLIGRAM(S): at 18:13

## 2022-11-14 RX ADMIN — Medication 8 MILLIGRAM(S): at 05:38

## 2022-11-14 RX ADMIN — Medication 5 MILLIGRAM(S): at 05:36

## 2022-11-14 RX ADMIN — Medication 50 MILLIGRAM(S): at 13:57

## 2022-11-14 RX ADMIN — Medication 1 APPLICATION(S): at 06:24

## 2022-11-14 RX ADMIN — PANTOPRAZOLE SODIUM 40 MILLIGRAM(S): 20 TABLET, DELAYED RELEASE ORAL at 13:56

## 2022-11-14 RX ADMIN — CHLORHEXIDINE GLUCONATE 1 APPLICATION(S): 213 SOLUTION TOPICAL at 06:24

## 2022-11-14 RX ADMIN — Medication 2: at 00:24

## 2022-11-14 RX ADMIN — Medication 1 TABLET(S): at 13:57

## 2022-11-14 RX ADMIN — NYSTATIN CREAM 1 APPLICATION(S): 100000 CREAM TOPICAL at 18:13

## 2022-11-14 RX ADMIN — Medication 4 MILLILITER(S): at 05:08

## 2022-11-14 RX ADMIN — Medication 4 MILLILITER(S): at 18:10

## 2022-11-14 RX ADMIN — ONDANSETRON 4 MILLIGRAM(S): 8 TABLET, FILM COATED ORAL at 12:30

## 2022-11-14 NOTE — PROGRESS NOTE ADULT - NUTRITIONAL ASSESSMENT
Diet, NPO with Tube Feed:   Tube Feeding Modality: Nasogastric  Glucerna 1.5 Chago (GLUCERNA1.5RTH)  Total Volume for 24 Hours (mL): 1320  Continuous  Starting Tube Feed Rate {mL per Hour}: 55  Until Goal Tube Feed Rate (mL per Hour): 55  Tube Feed Duration (in Hours): 24  Tube Feed Start Time: 17:00 (11-14-22 @ 17:03) [Active]      Please see RD assessment and/or follow up.  Managed by primary team as well

## 2022-11-14 NOTE — PROGRESS NOTE ADULT - TIME BILLING
as above: decannulated stable--still awaiting floor bed--resp stable-speaking well, ambulating daily/stronger over all  -ID f/up-resp stable--wound care f/up-TC continues- (she will always have secretions) due to TBM-s/p  trach 10/10-s/p  resp failure-s/p [id number] ABX-stable CV status-re- VEST rx in use  multifactorial dyspnea-resp failure-severe persistent asthma, TBM s/p tracheoplasty, s/p Aortic aneurysm repair, Bronchitis (proteus)-O2-keep 90% (RA/NC)  severe persistent asthma--medrol 8mg, singulair 10, duoneb q 6, budes .5 bid, tezspire 8/29-was due 9/29-next upon DC  TBM-s/p tracheoplasty--accapella, vest rx, mucomyst here 2 cc 10% q 8 (secretions)  s/p Aortic aneurysm repair--as per CTS staff care  AF-on eliquis rx               CV-improved hydralazine/lopressor/mexilitine   DVT R-IJ-on heparin rx  *****ID-s/p proteus bronchitis-s/p meropenum changed to ceftriaxone and back to meropenem/vanco- off of ABX as of 9/19--restart of ABX 9/27-meropenem/vanco-s/p  meropenem but continues vanco for MRSE sepsis (11/26 end date for vanco), plastics/ortho for elbow-proteus (?wash out)-vac in place-suppressive rx-bact/fluconazole  PT-OOB as able                             GI-TF as able--f/up LFTs-normal       ****ORTHO f/up--? additional wash out of elbow wound?; wound care f/up  **VC dysfunction--aspiration precautions-s/p trach 10/10-ENT f/up s/p laryngoscopy- decannulated    Heme onc f/up colon ca  prog--fair                PT-to continue-more OOB     DC planning    Eulalio Allison MD-Pulmonary   [phone]

## 2022-11-14 NOTE — PROGRESS NOTE ADULT - ASSESSMENT

## 2022-11-14 NOTE — PROGRESS NOTE ADULT - ASSESSMENT
72 F w/h/o uncontrolled T2DM (A1C 9.4%) on basal/bolus insulin PTA. Unknown DM complications. Also COPD, secondary adrenal Insufficiency on chronic steroids, colorectal cancer s/p resection (colostomy bag), Chronic A fib on Eliquis, and tracheomalacia and multiple intubations, recent dx of OM presented to ED at [facility] with epigastric pain, belching and central chest pain. Found on CTA to have type A aortic dissection and transferred to [facility] for surgical evaluation by Dr. Cabrera. Now s/p aortic dissection repair on 9/07/22, sepsis, and now s/p trach 10/10 and more recently s/p R elbow eschar debridement 10/22 > Wound VAC 10/24.  Endocrine consulted for assistance with uncontrolled DM/steroids for AI. BG now variable with increasing values at 12noon after pt gets Prednisone. PT was requiring up to 50 units of NPH at 6am  last week so will continue to increase 6am doses to improve glycemic control at 12noon. No s/sx of hypoglycemia. BG goal 100 to 180s.   Noted TFs rate coming down to 55cc/hr today.

## 2022-11-14 NOTE — PROGRESS NOTE ADULT - NSPROGADDITIONALINFOA_GEN_ALL_CORE
-Plan discussed with pt/team.  Contact info: [phone] (24/7). pager [phone]  [email or web address] password [email or web address]  Teams  Spent 30 minutes assessing pt/labs/meds and discussing plan of care with primary team  Adjusting insulin  Discharge plan  Follow up care -Plan discussed with pt/team. Spoke to CTU team to f/u pt c/o R mouth/ear pain.   Contact info: [phone] (24/7). pager [phone]  [email or web address] password [email or web address]  Teams  Spent 30 minutes assessing pt/labs/meds and discussing plan of care with primary team  Adjusting insulin  Discharge plan  Follow up care

## 2022-11-14 NOTE — PROGRESS NOTE ADULT - SUBJECTIVE AND OBJECTIVE BOX
Subjective: "Hello"  Sitting up in bed  Alert, answers questions    Tele:     SR 80s                           T(C): 36.8 (11-14-22 @ 12:00), Max: 37.4 (11-13-22 @ 20:00)  HR: 80 (11-14-22 @ 15:00) (74 - 90)  BP: 112/50 (11-14-22 @ 14:00) (104/46 - 172/64)  RR: 22 (11-14-22 @ 15:00) (19 - 41)  SpO2: 96% (11-14-22 @ 15:00) (95% - 100%)        11-13    136  |  100  |  18  ----------------------------<  231<H>  4.7   |  23  |  0.48<L>    Ca    9.0      13 Nov 2022 00:17  Phos  3.9     11-13  Mg     1.8     11-13    TPro  7.0  /  Alb  3.2<L>  /  TBili  0.2  /  DBili  x   /  AST  27  /  ALT  16  /  AlkPhos  123<H>  11-13                               10.6   12.47 )-----------( 272      ( 13 Nov 2022 00:17 )             35.0            CAPILLARY BLOOD GLUCOSE  198 (14 Nov 2022 06:00)      POCT Blood Glucose.: 305 mg/dL (14 Nov 2022 13:53)  POCT Blood Glucose.: 198 mg/dL (14 Nov 2022 05:34)  POCT Blood Glucose.: 151 mg/dL (14 Nov 2022 00:23)  POCT Blood Glucose.: 168 mg/dL (13 Nov 2022 17:26)           CXR:      Assessment    General: NAD,   Neurology: Nonfocal, responds to commands. Moves all extremities  ENT/Neck: + trach dressing intact> neck supple, trachea midline   Respiratory: coarse BS b/l, rhonchi throughout, minimal secretions present  CV: S1S2, no murmurs  Abdominal: Soft, NT, ND, colostomy in place (stoma intact)  Extremities: nonpitting edema, improved. Right elbow wound vac c/d/i. warm and well-perfused.  Skin: Dry dressing over right heel. No cyanosis          MEDICATIONS  (STANDING):  acetylcysteine 10%  Inhalation 4 milliLiter(s) Inhalation every 12 hours  albuterol/ipratropium for Nebulization 3 milliLiter(s) Nebulizer every 6 hours  apixaban 5 milliGRAM(s) Enteral Tube every 12 hours  ascorbic acid 500 milliGRAM(s) Oral daily  buDESOnide    Inhalation Suspension 0.5 milliGRAM(s) Inhalation every 12 hours  chlorhexidine 2% Cloths 1 Application(s) Topical <User Schedule>  collagenase Ointment 1 Application(s) Topical daily  diphenhydramine 2%/zinc acetate 0.1% Cream 1 Application(s) Topical every 8 hours  fluconAZOLE IVPB 600 milliGRAM(s) IV Intermittent every 24 hours  hydrALAZINE 50 milliGRAM(s) Oral three times a day  insulin lispro (ADMELOG) corrective regimen sliding scale   SubCutaneous every 6 hours  insulin NPH human recombinant 4 Unit(s) SubCutaneous <User Schedule>  insulin NPH human recombinant 28 Unit(s) SubCutaneous <User Schedule>  magnesium oxide 400 milliGRAM(s) Oral every 8 hours  methylPREDNISolone 8 milliGRAM(s) Oral daily  metoclopramide Injectable 5 milliGRAM(s) IV Push every 8 hours  metoprolol tartrate 12.5 milliGRAM(s) Oral two times a day  mexiletine 200 milliGRAM(s) Oral every 8 hours  multivitamin 1 Tablet(s) Oral daily  nystatin Powder 1 Application(s) Topical two times a day  pantoprazole  Injectable 40 milliGRAM(s) IV Push daily  sodium chloride 0.9% lock flush 3 milliLiter(s) IV Push every 8 hours  vancomycin  IVPB 750 milliGRAM(s) IV Intermittent every 12 hours  vancomycin  IVPB           PAST MEDICAL & SURGICAL HISTORY:  Atrial fibrillation  paroxysmal, on eliquis      Diabetes  Type 2      COPD (chronic obstructive pulmonary disease)      Adrenal insufficiency  Medrol daily for over 50 years      Aortic insufficiency  moderate AR on echo 5/3/2018      Pelvic fracture      Asthma      Tracheobronchomalacia  diagnosed 2015, s/p bronchial thermoplasty 2016 (Dr Zapien); recent bronchoscopy 6/5/2018 revealed no evidence of tracheobronchomalacia in trachea or bronchial tubes      Colorectal cancer  4/2018- last treatment , chemo and radiation      Rectal bleeding      Seizure  x 1 1/7/18      DVT (deep venous thrombosis)  15-20 years ago, took coumadin      TIA (transient ischemic attack)  multiple, last 5 years ago - presents as right-sided weakness      History of partial hysterectomy  30 years ago - fibroids      H/O total knee replacement, bilateral  5 years ago      History of sinus surgery  multiple sinus surgeries      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

## 2022-11-14 NOTE — PROGRESS NOTE ADULT - PROBLEM SELECTOR PLAN 1
Hydralazine PO for BP management   Rate control with Lopressor and mexiletine   Wound VAC on rt elbow intact, changed 11/11  AC Therapy with Eliquis 5 BID for Afib and IJ thrombus  Stopped daily blood work (T/Th/Sat)   Glucerna 1.2 @ goal 65ml/hr, tolerating without issues

## 2022-11-14 NOTE — PROGRESS NOTE ADULT - ASSESSMENT
71F PMH DM, COPD, chronic adrenal insufficiency on Prednisone, history of colorectal cancer s/p resection (colostomy bag), Hx of CAD, chronic AF on Eliquis, and tracheomalacia s/p multiple intubations, recent dx of R foot OM s/p debridement on antibiotics and presented to ED at [facility] this morning with epigastric pain, belching and central chest pain. Found on CTA to have type A aortic dissection and transferred to [facility] for surgical evaluation by Dr. Cabrera.     Ascending aortic dissection s/p type A dissection repair and Biobentall on 9/7/22  Repeated Respiratory failure with subsequent  s/p Trach 10/10/22, decannulated on 11/12  Hypovolemia   Post op respiratory failure   Acute blood loss anemia  Lactic Acidosis   Leukocytosis   Stress hyperglycemia   RIJ thrombus  MRSA PNA  11/14 Tx 2 [place]

## 2022-11-14 NOTE — PROGRESS NOTE ADULT - ASSESSMENT
71 year-old female with a history of DM2, CAD, A-Fib on apixaban, Severe Persistent Asthma (on chronic steroids, recently started on Tezspire), colon cancer s/p resection/chemo, and tracheobronchomalacia s/p tracheoplasty, and recent OM of the R foot s/b debridement and completed course of Vancomycin/Ertapenem for MRSA/ESBL Proteus/Corynebacterium who now presents with chest pain, found to have Type A dissection s/p repair with modified Bentall procedure and hemiarch replacement on 9/6/22. Post-op course complicated by acute hypoxemic respiratory failure, shock, anemia, and hyperglycemia requiring insulin gtt. Extubated 9/13, now awake and alert with mild encephalopathy but overall significantly improved.    Assessment:  Acute hypoxemic respiratory failure requiring intubation  Type A Aortic Dissection  Severe Persistent Asthma  History of Tracheobronchomalacia  fevers and MSSA bacteremia and tracheal aspirate material with MSSA    -leukocytosis  Fevers and MRSA bacteremia last + culture 9/28  last positive Candida blood culture 9/30  Continue IV vancomycin- trough remains therapeutic  Right elbow growing ESBL Proteus -continue Meropenem and she may need further drainage or a washout to the joint given persistent positive cultures and leukocytosis, with next wound vac change would send a repeat photo and culture of the elbow drainage        MRSA  bacteremia and candidemia  Will plan to treat for 6 weeks in the setting of post surgical repair of thoracic aorta dissection through 11/15/22 after which she may need chronic oral suppression with Bactrim plus fluconazole  Continue to follow CBC  Continue to follow creatinine  Continue to follow Vanco trough levels Vancomycin Level, Trough: 11.2:   repeat blood cultures for evidence of fungemia and bacteremia clearance show evidence of clearing of infections and once IV antibiotics are completed will discuss oral suppression options  ESBL proteus in elbow area fluid sensitive to meropenem and aspiration 10/22 grew proteus (reportedly superficial and not involving bursa or joint)  completed meropenem        Jeff Calixto MD  Can be called via Teams  After 5pm/weekends [phone]       71 year-old female with a history of DM2, CAD, A-Fib on apixaban, Severe Persistent Asthma (on chronic steroids, recently started on Tezspire), colon cancer s/p resection/chemo, and tracheobronchomalacia s/p tracheoplasty, and recent OM of the R foot s/b debridement and completed course of Vancomycin/Ertapenem for MRSA/ESBL Proteus/Corynebacterium who now presents with chest pain, found to have Type A dissection s/p repair with modified Bentall procedure and hemiarch replacement on 9/6/22. Post-op course complicated by acute hypoxemic respiratory failure, shock, anemia, and hyperglycemia requiring insulin gtt. Extubated 9/13, now awake and alert with mild encephalopathy but overall significantly improved.    Assessment:  Acute hypoxemic respiratory failure requiring intubation  Type A Aortic Dissection  Severe Persistent Asthma  History of Tracheobronchomalacia  fevers and MRSA bacteremia and tracheal aspirate material with MRSA    -leukocytosis resolved   MRSA bacteremia last + culture 9/28  last positive Candida blood culture 9/30  Continue IV vancomycin- trough to be repeated  Right elbow area grew ESBL Proteus - treated with a course of meropenem and a wound vac with improvement in swelling and no involvement of the joint space reported        MRSA  bacteremia and candidemia  Will plan to treat for 6 weeks in the setting of post surgical repair of thoracic aorta dissection through 11/15/22 after which she may need chronic oral suppression with Bactrim plus fluconazole  Continue to follow CBC  Continue to follow creatinine  Continue to follow Vanco trough levels Vancomycin Level, Trough: 5 would repeat    blood cultures for evidence of fungemia and bacteremia clearance showed evidence of clearing of infections on 10/1 and once IV antibiotics are completed will discuss oral suppression options with fluconazole 400mg and Bactrim DS daily   ESBL proteus in elbow area fluid sensitive to meropenem and aspiration 10/22 grew proteus (reportedly superficial and not involving bursa or joint)  completed meropenem        Jeff Calixto MD  Can be called via Teams  After 5pm/weekends [phone]

## 2022-11-14 NOTE — CHART NOTE - NSCHARTNOTEFT_GEN_A_CORE
Nutrition Follow Up Note  Patient seen for: nutrition follow up.    Chart reviewed, events noted. Pt is a 71F w/h/o uncontrolled T2DM (A1C 9.4%) on basal/bolus insulin PTA. Unknown DM complications. Also COPD, secondary adrenal Insufficiency on chronic steroids, colorectal cancer s/p resection (colostomy bag), Chronic A fib on Eliquis, and tracheomalacia and multiple intubations, recent dx of OM presented to ED at [facility] with epigastric pain, belching and central chest pain. Found on CTA to have type A aortic dissection and transferred to [facility] for surgical evaluation by Dr. Cabrera. Now s/p Type A aortic dissection repair.     Source: [x] Patient       [x] EMR        [x] RN        [] Family at bedside       [] Other:    -If unable to interview patient: [] Trach/Vent/BiPAP  [] Disoriented/confused/inappropriate to interview    Diet, NPO with Tube Feed:   Tube Feeding Modality: Nasogastric  Glucerna 1.2 [place] (GLUCERNARTH)  Total Volume for 24 Hours (mL): 1560  Continuous  Starting Tube Feed Rate {mL per Hour}: 65  Until Goal Tube Feed Rate (mL per Hour): 65  Tube Feed Duration (in Hours): 24  Tube Feed Start Time: 08:20  No Carb Prosource TF     Qty per Day:  1 (10-25-22 @ 15:15)    EN Order Provides: 1560ml, 1912kcal and 105g protein, 1256mL free water. Based on UBW (62.3kg): 30.6kcal/kg and ~1.7g protein/kg.   Current Pump Rate: 65mL/hr  EN provision per flow sheets:    - 5 Day Average: 1456mL or 93% goal volume     Nutrition-related concerns:   - Pt decannulated .   - NPH + SSI for BG management. Endocrinology following for adjustments in NPH as needed. Pt continues on Medrol for adrenal insufficiency.   - IVF: NS @ 10mL/hr    GI: Colostomy output: 50mL ()   Bowel Regimen? [] Yes   [x] No   - Antibiotic regimen noted   - IV Zofran    Weights:   Daily Weight in k (-14), Weight in k.6 (-13), Weight in k.9 (-12), Weight in k.9 (-11), Weight in k.2 (11-10), Weight in k.9 (-)   - weight fluctuations noted, will continue to monitor    MEDICATIONS  (STANDING):  acetylcysteine 10%  Inhalation 4 milliLiter(s) Inhalation every 12 hours  albuterol/ipratropium for Nebulization 3 milliLiter(s) Nebulizer every 6 hours  apixaban 5 milliGRAM(s) Enteral Tube every 12 hours  ascorbic acid 500 milliGRAM(s) Oral daily  buDESOnide    Inhalation Suspension 0.5 milliGRAM(s) Inhalation every 12 hours  chlorhexidine 2% Cloths 1 Application(s) Topical <User Schedule>  collagenase Ointment 1 Application(s) Topical daily  diphenhydramine 2%/zinc acetate 0.1% Cream 1 Application(s) Topical every 8 hours  fluconAZOLE IVPB 600 milliGRAM(s) IV Intermittent every 24 hours  hydrALAZINE 50 milliGRAM(s) Oral three times a day  insulin lispro (ADMELOG) corrective regimen sliding scale   SubCutaneous every 6 hours  insulin NPH human recombinant 4 Unit(s) SubCutaneous <User Schedule>  insulin NPH human recombinant 28 Unit(s) SubCutaneous <User Schedule>  magnesium oxide 400 milliGRAM(s) Oral every 8 hours  methylPREDNISolone 8 milliGRAM(s) Oral daily  metoclopramide Injectable 5 milliGRAM(s) IV Push every 8 hours  metoprolol tartrate 12.5 milliGRAM(s) Oral two times a day  mexiletine 200 milliGRAM(s) Oral every 8 hours  multivitamin 1 Tablet(s) Oral daily  nystatin Powder 1 Application(s) Topical two times a day  ondansetron Injectable 4 milliGRAM(s) IV Push once  pantoprazole  Injectable 40 milliGRAM(s) IV Push daily  vancomycin  IVPB 750 milliGRAM(s) IV Intermittent every 12 hours  vancomycin  IVPB        MEDICATIONS  (PRN):  acetaminophen    Suspension .. 650 milliGRAM(s) Oral every 6 hours PRN Temp greater or equal to 38C (100.4F), Mild Pain (1 - 3)  calamine/zinc oxide Lotion 1 Application(s) Topical every 8 hours PRN Itching    Pertinent Labs: NO BMP AVAILABLE     A1C with Estimated Average Glucose Result: 9.4 % (22 @ 16:46)  A1C with Estimated Average Glucose Result: 9.2 % (22 @ 07:36)  A1C with Estimated Average Glucose Result: 8.0 % (05-10-22 @ 12:26)    Finger Sticks:   POCT Blood Glucose.: 198 mg/dL (2022 05:34)  POCT Blood Glucose.: 151 mg/dL (2022 00:23)  POCT Blood Glucose.: 168 mg/dL (2022 17:26)  POCT Blood Glucose.: 307 mg/dL (2022 13:07)    Skin per nursing documentation: Stage IV right elbow, DTI left heel  Edema: +1 generalized     Based on UBW 62.3kg with consideration for decannulation, pressure injuries  Estimated Energy Needs: 1869 - 2180kcal (30 - 35kcal/kg)   Estimated Protein Needs: 87 - 112g (1.4 - 1.8g/kg)  Estimated Fluid Needs: per team.    Previous Nutrition Diagnosis: Moderate Acute Malnutrition   Nutrition Diagnosis is: [x] ongoing  [] resolved [] not applicable     New Nutrition Diagnosis: n/a    Nutrition Care Plan:  [x] In Progress - being addressed with EN   [] Achieved  [] Not applicable    Nutrition Interventions:     Education Provided:       [] Yes:  [x] No: N/A    Recommendations:  1. GLUCERNA 1.5 BACK IN STOCK. Recommend change in EN regimen to Glucerna 1.5 with goal rate of 55mL/hr x 24hr. To provide: 1320mL, 1980kcal and 109g protein, 1002mL free water. Based on UBW (62.3kg): ~32kcal/kg and 1.7g protein/kg. Defer additional free water flushes to team.  2. Monitor GI tolerance. RD to remain available to adjust EN formulary, volume/rate PRN.   3. Antihyperglycemic regimen per Endocrinology.  4. Continue multivitamin and vitamin C to support wound healing.    Monitoring and Evaluation:   Continue to monitor nutritional intake, tolerance to diet prescription, weights, labs, skin integrity    RD remains available upon request and will follow up per protocol    Deedee Rosas MS, RD, CDN, [facility] Pager #[phone]

## 2022-11-14 NOTE — PROGRESS NOTE ADULT - SUBJECTIVE AND OBJECTIVE BOX
INFECTIOUS DISEASES FOLLOW UP-- Fouzia Marily  [phone]    This is a follow up note for this  72yFemale with  Dissection of thoracic aorta        ROS:  CONSTITUTIONAL:  No fever, good appetite  CARDIOVASCULAR:  No chest pain or palpitations  RESPIRATORY:  No dyspnea  GASTROINTESTINAL:  No nausea, vomiting, diarrhea, or abdominal pain  GENITOURINARY:  No dysuria  NEUROLOGIC:  No headache,     Allergies    aspirin (Short breath)  Avelox (Short breath; Pruritus)  cefepime (Anaphylaxis)  codeine (Short breath)  Dilaudid (Short breath)  iodine (Short breath; Swelling)  penicillin (Anaphylaxis)  shellfish (Anaphylaxis)  tetanus toxoid (Short breath)  Valium (Short breath)    Intolerances        ANTIBIOTICS/RELEVANT:  antimicrobials  fluconAZOLE IVPB 600 milliGRAM(s) IV Intermittent every 24 hours  vancomycin  IVPB 750 milliGRAM(s) IV Intermittent every 12 hours  vancomycin  IVPB        immunologic:    OTHER:  acetaminophen    Suspension .. 650 milliGRAM(s) Oral every 6 hours PRN  acetylcysteine 10%  Inhalation 4 milliLiter(s) Inhalation every 12 hours  albuterol/ipratropium for Nebulization 3 milliLiter(s) Nebulizer every 6 hours  apixaban 5 milliGRAM(s) Enteral Tube every 12 hours  ascorbic acid 500 milliGRAM(s) Oral daily  buDESOnide    Inhalation Suspension 0.5 milliGRAM(s) Inhalation every 12 hours  calamine/zinc oxide Lotion 1 Application(s) Topical every 8 hours PRN  chlorhexidine 2% Cloths 1 Application(s) Topical <User Schedule>  collagenase Ointment 1 Application(s) Topical daily  diphenhydramine 2%/zinc acetate 0.1% Cream 1 Application(s) Topical every 8 hours  hydrALAZINE 50 milliGRAM(s) Oral three times a day  insulin lispro (ADMELOG) corrective regimen sliding scale   SubCutaneous every 6 hours  insulin NPH human recombinant 4 Unit(s) SubCutaneous <User Schedule>  insulin NPH human recombinant 28 Unit(s) SubCutaneous <User Schedule>  magnesium oxide 400 milliGRAM(s) Oral every 8 hours  methylPREDNISolone 8 milliGRAM(s) Oral daily  metoclopramide Injectable 5 milliGRAM(s) IV Push every 8 hours  metoprolol tartrate 12.5 milliGRAM(s) Oral two times a day  mexiletine 200 milliGRAM(s) Oral every 8 hours  multivitamin 1 Tablet(s) Oral daily  nystatin Powder 1 Application(s) Topical two times a day  pantoprazole  Injectable 40 milliGRAM(s) IV Push daily  sodium chloride 0.9% lock flush 3 milliLiter(s) IV Push every 8 hours      Objective:  Vital Signs Last 24 Hrs  T(C): 36.8 (14 Nov 2022 12:00), Max: 37.4 (13 Nov 2022 20:00)  T(F): 98.3 (14 Nov 2022 12:00), Max: 99.4 (13 Nov 2022 20:00)  HR: 80 (14 Nov 2022 15:00) (74 - 90)  BP: 112/50 (14 Nov 2022 14:00) (104/46 - 172/64)  BP(mean): 72 (14 Nov 2022 14:00) (65 - 100)  RR: 22 (14 Nov 2022 15:00) (19 - 41)  SpO2: 96% (14 Nov 2022 15:00) (95% - 100%)    Parameters below as of 14 Nov 2022 12:00  Patient On (Oxygen Delivery Method): room air        PHYSICAL EXAM:  Constitutional:no acute distress  Eyes:EBER, EOMI  Ear/Nose/Throat: no oral lesions, 	  Respiratory: clear BL  Cardiovascular: S1S2  Gastrointestinal:soft, (+) BS, no tenderness  Extremities:no e/e/c  No Lymphadenopathy  IV sites not inflammed.    LABS:                        10.6   12.47 )-----------( 272      ( 13 Nov 2022 00:17 )             35.0     11-13    136  |  100  |  18  ----------------------------<  231<H>  4.7   |  23  |  0.48<L>    Ca    9.0      13 Nov 2022 00:17  Phos  3.9     11-13  Mg     1.8     11-13    TPro  7.0  /  Alb  3.2<L>  /  TBili  0.2  /  DBili  x   /  AST  27  /  ALT  16  /  AlkPhos  123<H>  11-13          MICROBIOLOGY:      COVID-19 PCR: NotDetec: You can help in the fight against COVID-19. [facility] may contact  you to see if you are interested in voluntarily participating in one of  our clinical trials.  Testing is performed using polymerase chain reaction (PCR) or  transcription mediated amplification (TMA). This COVID-19 (SARS-CoV-2)  nucleic acid amplification test was validated by [employer] and is  in use under the FDA Emergency Use Authorization (EUA) for clinical labs  CLIA-certified to perform high complexity testing. Test results should be  correlated with clinical presentation, patient history, and epidemiology. (11.13.22 @ 03:16)          RECENT CULTURES:      RADIOLOGY & ADDITIONAL STUDIES:  < from: Xray Chest 1 View- PORTABLE-Routine (Xray Chest 1 View- PORTABLE-Routine in AM.) (11.13.22 @ 04:15) >  FINDINGS:  Enteric tube courses past the diaphragm, distal tip not visualized.   Sternotomy wires.  The heart size is normal.  New, trace left pleural effusion.  No pneumothorax.    IMPRESSION:  New, trace left pleural effusion.    --- End of Report ---    < end of copied text >   INFECTIOUS DISEASES FOLLOW UP-- Fouzia Stephenach  [phone]    This is a follow up note for this  72yFemale with  Dissection of thoracic aorta  transferred to a floor bed today        ROS:  CONSTITUTIONAL:  No fever, good appetite  CARDIOVASCULAR:  No chest pain or palpitations  RESPIRATORY:  No dyspnea  GASTROINTESTINAL:  No nausea, vomiting, diarrhea, or abdominal pain  GENITOURINARY:  No dysuria  NEUROLOGIC:  No headache,     Allergies    aspirin (Short breath)  Avelox (Short breath; Pruritus)  cefepime (Anaphylaxis)  codeine (Short breath)  Dilaudid (Short breath)  iodine (Short breath; Swelling)  penicillin (Anaphylaxis)  shellfish (Anaphylaxis)  tetanus toxoid (Short breath)  Valium (Short breath)    Intolerances        ANTIBIOTICS/RELEVANT:  antimicrobials  fluconAZOLE IVPB 600 milliGRAM(s) IV Intermittent every 24 hours  vancomycin  IVPB 750 milliGRAM(s) IV Intermittent every 12 hours  vancomycin  IVPB        immunologic:    OTHER:  acetaminophen    Suspension .. 650 milliGRAM(s) Oral every 6 hours PRN  acetylcysteine 10%  Inhalation 4 milliLiter(s) Inhalation every 12 hours  albuterol/ipratropium for Nebulization 3 milliLiter(s) Nebulizer every 6 hours  apixaban 5 milliGRAM(s) Enteral Tube every 12 hours  ascorbic acid 500 milliGRAM(s) Oral daily  buDESOnide    Inhalation Suspension 0.5 milliGRAM(s) Inhalation every 12 hours  calamine/zinc oxide Lotion 1 Application(s) Topical every 8 hours PRN  chlorhexidine 2% Cloths 1 Application(s) Topical <User Schedule>  collagenase Ointment 1 Application(s) Topical daily  diphenhydramine 2%/zinc acetate 0.1% Cream 1 Application(s) Topical every 8 hours  hydrALAZINE 50 milliGRAM(s) Oral three times a day  insulin lispro (ADMELOG) corrective regimen sliding scale   SubCutaneous every 6 hours  insulin NPH human recombinant 4 Unit(s) SubCutaneous <User Schedule>  insulin NPH human recombinant 28 Unit(s) SubCutaneous <User Schedule>  magnesium oxide 400 milliGRAM(s) Oral every 8 hours  methylPREDNISolone 8 milliGRAM(s) Oral daily  metoclopramide Injectable 5 milliGRAM(s) IV Push every 8 hours  metoprolol tartrate 12.5 milliGRAM(s) Oral two times a day  mexiletine 200 milliGRAM(s) Oral every 8 hours  multivitamin 1 Tablet(s) Oral daily  nystatin Powder 1 Application(s) Topical two times a day  pantoprazole  Injectable 40 milliGRAM(s) IV Push daily  sodium chloride 0.9% lock flush 3 milliLiter(s) IV Push every 8 hours      Objective:  Vital Signs Last 24 Hrs  T(C): 36.8 (14 Nov 2022 12:00), Max: 37.4 (13 Nov 2022 20:00)  T(F): 98.3 (14 Nov 2022 12:00), Max: 99.4 (13 Nov 2022 20:00)  HR: 80 (14 Nov 2022 15:00) (74 - 90)  BP: 112/50 (14 Nov 2022 14:00) (104/46 - 172/64)  BP(mean): 72 (14 Nov 2022 14:00) (65 - 100)  RR: 22 (14 Nov 2022 15:00) (19 - 41)  SpO2: 96% (14 Nov 2022 15:00) (95% - 100%)    Parameters below as of 14 Nov 2022 12:00  Patient On (Oxygen Delivery Method): room air        PHYSICAL EXAM:  Constitutional:no acute distress, interactive, coherent  Eyes:EBER, EOMI  Ear/Nose/Throat: no oral lesions, trach site healing	  Respiratory: clear BL  Cardiovascular: S1S2 sternotomy wound healing  Gastrointestinal:soft, (+) BS, no tenderness  Extremities:no e/e/c wound vac on right elbow area, swelling has resolved  No Lymphadenopathy  IV sites not inflammed.    LABS:                        10.6   12.47 )-----------( 272      ( 13 Nov 2022 00:17 )             35.0     11-13    136  |  100  |  18  ----------------------------<  231<H>  4.7   |  23  |  0.48<L>    Ca    9.0      13 Nov 2022 00:17  Phos  3.9     11-13  Mg     1.8     11-13    TPro  7.0  /  Alb  3.2<L>  /  TBili  0.2  /  DBili  x   /  AST  27  /  ALT  16  /  AlkPhos  123<H>  11-13          MICROBIOLOGY:      COVID-19 PCR: NotDete: You can help in the fight against COVID-19. [facility] may contact  you to see if you are interested in voluntarily participating in one of  our clinical trials.  Testing is performed using polymerase chain reaction (PCR) or  transcription mediated amplification (TMA). This COVID-19 (SARS-CoV-2)  nucleic acid amplification test was validated by [employer] and is  in use under the FDA Emergency Use Authorization (EUA) for clinical labs  CLIA-certified to perform high complexity testing. Test results should be  correlated with clinical presentation, patient history, and epidemiology. (11.13.22 @ 03:16)      Culture - Blood (09.30.22 @ 11:45)    Gram Stain:   Growth in aerobic bottle: Yeast like cells    -  Amphotericin B: N/I 2    -  Anidulafungin: S 0.03    -  Caspofungin: S 0.06 CASPOFUNGIN: is indicated in the treatment of candidemia, intra-abdominal abscess, peritonitis, pleural space infections and esophageal candidiasis.    -  Fluconazole: SDD 2 Fluconazole = Data established for mucosal disease, and is generally applicable for invasive disease.  Susceptibility is dependent on achieving the maximal possible blood level.  Doses of 400 MG/day or more may be required in adults with normalrenalfunction and body habitus.    This test was developed and its performance characteristics determined by [facility], [place], [place]  It has not been cleared or approved by the U.S. Food and Drug Administration. S - Susceptible; SDD - Susceptible Dose Dependent; I - Intermediate; R - Resistant; N/I - No Interpretation Available    -  Micafungin: S 0.015    -  Posaconazole: N/I 0.25    -  Voriconazole: N/I 0.06 For Candida glabrata and voriconazole , current data are insufficient to demonstrate a correlation between in vitro susceptibility testing and clinical outcome.    VORICONAZOLE: The GARRETT has been determined by the colormetric microdilution method.    -  Candida glabrata: Detec    Specimen Source: .Blood Blood    Organism: Blood Culture PCR    Organism: Candida (Torulopsis) glabrata    Culture Results:   Growth in aerobic bottle: Candida glabrata  ***Blood Panel PCR results on this specimen are available  approximately 3 hours after the Gram stain result.***  Gram stain, PCR, and/or culture results may not always  correspond due to difference in methodologies.  ************************************************************  This PCR assay was performed by multiplex PCR. This  Assay tests for 66 bacterial and resistance gene targets.  Please refer to the [facility] [employer] test directory  at [email or web address] for details.    Organism Identification: Blood Culture PCR  Candida (Torulopsis) glabrata    Method Type: PCR    [facility]    Culture - Blood (09.28.22 @ 01:48)    Gram Stain:   Growth in anaerobic bottle: Gram Positive Cocci in Clusters    -  Ampicillin/Sulbactam: R <=8/4    -  Cefazolin: R 16    -  Clindamycin: R >4    -  Daptomycin: S 0.5    -  Erythromycin: R >4    -  Gentamicin: S <=1 Should not be used as monotherapy    -  Linezolid: S 2    -  Oxacillin: R >2    -  Penicillin: R >8    -  Rifampin: S <=1 Should not be used as monotherapy    -  Tetra/Doxy: R >8    -  Trimethoprim/Sulfamethoxazole: S <=0.5/9.5    -  Vancomycin: S 1    -  Methicillin resistant Staphylococcus aureus (MRSA): Detec    Specimen Source: .Blood Blood-Peripheral    Organism: Blood Culture PCR    Organism: Methicillin resistant Staphylococcus aureus    Culture Results:   Growth in anaerobic bottle: Methicillin Resistant Staphylococcus aureus  ***Blood Panel PCR results on this specimen are available  approximately 3 hours after the Gram stain result.***  Gram stain, PCR, and/or culture results may not always  correspond due to difference in methodologies.  ************************************************************  This PCR assay was performed by multiplex PCR. This  Assay tests for 66 bacterial and resistance gene targets.  Please refer to the [facility] [employer] test directory  at [email or web address] for details.    Organism Identification: Blood Culture PCR  Methicillin resistant Staphylococcus aureus    Method Type: PCR    Method Type: GARRETT        RECENT CULTURES:      RADIOLOGY & ADDITIONAL STUDIES:  < from: Xray Chest 1 View- PORTABLE-Routine (Xray Chest 1 View- PORTABLE-Routine in AM.) (11.13.22 @ 04:15) >  FINDINGS:  Enteric tube courses past the diaphragm, distal tip not visualized.   Sternotomy wires.  The heart size is normal.  New, trace left pleural effusion.  No pneumothorax.    IMPRESSION:  New, trace left pleural effusion.    --- End of Report ---    < end of copied text >

## 2022-11-14 NOTE — PROGRESS NOTE ADULT - SUBJECTIVE AND OBJECTIVE BOX
Patient seen and examined at the bedside.    Remained critically ill on continuous ICU monitoring.    OBJECTIVE:  Vital Signs Last 24 Hrs  T(C): 37.1 (14 Nov 2022 04:00), Max: 37.4 (13 Nov 2022 20:00)  T(F): 98.8 (14 Nov 2022 04:00), Max: 99.4 (13 Nov 2022 20:00)  HR: 74 (14 Nov 2022 07:00) (72 - 96)  BP: 105/47 (14 Nov 2022 07:00) (104/46 - 172/64)  BP(mean): 68 (14 Nov 2022 07:00) (67 - 100)  RR: 26 (14 Nov 2022 07:00) (19 - 42)  SpO2: 97% (14 Nov 2022 07:00) (90% - 100%)    Parameters below as of 14 Nov 2022 05:28  Patient On (Oxygen Delivery Method): room air    Physical Exam:   General: Trach collar, NAD, Ambulates well  Neurology: Nonfocal, responds to commands. Moves all extremities  ENT/Neck: + trach c/d/i, neck supple, trachea midline   Respiratory: coarse BS b/l, rhonchi throughout, minimal secretions present  CV: S1S2, no murmurs        [x] Sinus Rhythm   Abdominal: Soft, NT, ND, colostomy in place (stoma intact)  Extremities: nonpitting edema, improved. Right elbow wound vac c/d/i. warm and well-perfused.  Skin: Dry dressing over right heel. No cyanosis      Assessment:  71F PMH DM, COPD, chronic adrenal insufficiency on Prednisone, history of colorectal cancer s/p resection (colostomy bag), Hx of CAD, chronic AF on Eliquis, and tracheomalacia s/p multiple intubations, recent dx of R foot OM s/p debridement on antibiotics and presented to ED at [facility] this morning with epigastric pain, belching and central chest pain. Found on CTA to have type A aortic dissection and transferred to [facility] for surgical evaluation by Dr. Cabrera.     Ascending aortic dissection s/p type A dissection repair and Biobentall on 9/7   Respiratory failure s/p Trach 10/10/22  Hypovolemia   Post op respiratory failure   Acute blood loss anemia  Lactic Acidosis   Leukocytosis   Stress hyperglycemia   RIJ thrombus  MRSA PNA    Plan:   ***Neuro***  [x] Nonfocal  follows commands, continue to monitor  PT/OT progress as tolerated, ambulated with physical therapy  OOBTC    ***Cardiovascular***  Limited TTE on 10/1: EF 69%, Hyperdynamic left ventricular systolic function. There is hypokinesis of the mid-base inferior wall. Nml RV fxn.   CT chest on 9/28: No CT evidence of pulmonary embolism. Status post repair of ascending aortic dissection with a stable dissection flap originating from the aortic arch and extending into the infrarenal abdominal aorta,  B/l UE duplex on 10/5: As before, there is an occluded RIGHT IJ vein with thrombosis extending to brachiocephalic vein. Superficial thrombophlebitis of the LEFT cephalic vein.   Invasive hemodynamic monitoring, assess perfusion indices   SR / Hct 35.0/ Lactate 2.4  Hydralazine PO for BP management   Rate control with Lopressor and mexiletine   Wound VAC on rt elbow intact, changed 11/11  [x] AC Therapy with Eliquis 5 BID for Afib and IJ thrombus  Stopped daily blood work (T/Th/Sat)    ***Pulmonary***  Trach decannulated 11/12/22   Daily X-rays changed to (T/Th/Sat)  [x] Stable on RA  Encourage incentive spirometry, continue pulse ox monitoring, follow ABGs   Continue duonebs  Suction as needed   Aggressive Pulmonary toilet        ***GI***  [x] Diet: TFs Glucerna 1.2 @ goal 65ml/hr, tolerating without issues (when glucerna 1.5 back in stock will switch and decrease goal rate to 55ml/hr)  [x] Protonix    Reglan for gut motility     ***Renal***  Continue to monitor I/Os, BUN/Creatinine.   Replete lytes PRN    ***ID***  SCx on 10/4+Methicillin resistant Staphylococcus aureus, c/w IVPB Vancomycin, (plan for 6 week course in setting of valve surgery, 11/26 end date)  9/27 blood culture Neela Glabarata, on flucanazole (lifelong suppression)  BCx 10/13 NGTD, BC 10/21 and SCx NGTD  R elbow wound, S/p IR for elbow drainage 10/13 + Few Proteus mirabilis ESBL, Meropenem 7 day trial completed 10/19-> reordered 10/20 for reaccumulation of elbow bursitis-  10/22 plastics debrided R elbow eschar to subc tissue, ~8cc fluid aspirated and sent for culture. VAC changes done by PT   Joint Fluid Cx 10/22 - p. mirabilis s/p Meropenem for 8 day course  cont vanco (until 11/26 for 6 week course) and fluconazole for lifelong suppression  needs to be on isolation until vanco complete and then re culture 3 days later and if clear can be off isolation  Arrow catheter replaced on 11/12    ***Endocrine***  [x]  DM : HbA1c 9.4%                - [x] ISS  [x] NPH              - Need tight glycemic control to prevent wound infection.  Endocrine following, appreciate recommendations        Patient requires continuous monitoring with bedside rhythm monitoring, pulse oximetry monitoring, and continuous central venous and arterial pressure monitoring; and intermittent blood gas analysis. Care plan discussed with the ICU care team.   Patient remained critical, at risk for life threatening decompensation.    I have spent 30 minutes providing critical care management to this patient.    By signing my name below, I, Bharti Barrios, attest that this documentation has been prepared under the direction and in the presence of KIANA Garzon.  Electronically signed: Georgi Gottlieb, 11-14-22 @ 07:22    I, Dejuan Perry, personally performed the services described in this documentation. all medical record entries made by the scribe were at my direction and in my presence. I have reviewed the chart and agree that the record reflects my personal performance and is accurate and complete  Electronically signed: KIANA Garzon. Patient seen and examined at the bedside.    OOBTC, Ambulating with PT, Patient looks greatly improved and is awaiting transfer to a floor bed.   Patient offers no complaints. Denies any chest pain or shortness of breath.    OBJECTIVE:  Vital Signs Last 24 Hrs  T(C): 37.1 (14 Nov 2022 04:00), Max: 37.4 (13 Nov 2022 20:00)  T(F): 98.8 (14 Nov 2022 04:00), Max: 99.4 (13 Nov 2022 20:00)  HR: 74 (14 Nov 2022 07:00) (72 - 96)  BP: 105/47 (14 Nov 2022 07:00) (104/46 - 172/64)  BP(mean): 68 (14 Nov 2022 07:00) (67 - 100)  RR: 26 (14 Nov 2022 07:00) (19 - 42)  SpO2: 97% (14 Nov 2022 07:00) (90% - 100%)    Parameters below as of 14 Nov 2022 05:28  Patient On (Oxygen Delivery Method): room air    Physical Exam:   General: NAD, Ambulates well  Neurology: Nonfocal, responds to commands. Moves all extremities  ENT/Neck: + trach c/d/i, neck supple, trachea midline   Respiratory: coarse BS b/l, rhonchi throughout, minimal secretions present  CV: S1S2, no murmurs        [x] Sinus Rhythm   Abdominal: Soft, NT, ND, colostomy in place (stoma intact)  Extremities: nonpitting edema, improved. Right elbow wound vac c/d/i. warm and well-perfused.  Skin: Dry dressing over right heel. No cyanosis      Assessment:  71F PMH DM, COPD, chronic adrenal insufficiency on Prednisone, history of colorectal cancer s/p resection (colostomy bag), Hx of CAD, chronic AF on Eliquis, and tracheomalacia s/p multiple intubations, recent dx of R foot OM s/p debridement on antibiotics and presented to ED at [facility] this morning with epigastric pain, belching and central chest pain. Found on CTA to have type A aortic dissection and transferred to [facility] for surgical evaluation by Dr. Cabrera.     Ascending aortic dissection s/p type A dissection repair and Biobentall on 9/7   Respiratory failure s/p Trach 10/10/22, decannulated on 11/12  Hypovolemia   Post op respiratory failure   Acute blood loss anemia  Lactic Acidosis   Leukocytosis   Stress hyperglycemia   RIJ thrombus  MRSA PNA    Plan:   ***Neuro***  [x] Nonfocal  follows commands, continue to monitor  PT/OT progress as tolerated, ambulated with physical therapy  OOBTC    ***Cardiovascular***  Limited TTE on 10/1: EF 69%, Hyperdynamic left ventricular systolic function. There is hypokinesis of the mid-base inferior wall. Nml RV fxn.   CT chest on 9/28: No CT evidence of pulmonary embolism. Status post repair of ascending aortic dissection with a stable dissection flap originating from the aortic arch and extending into the infrarenal abdominal aorta,  B/l UE duplex on 10/5: As before, there is an occluded RIGHT IJ vein with thrombosis extending to brachiocephalic vein. Superficial thrombophlebitis of the LEFT cephalic vein.   Invasive hemodynamic monitoring, assess perfusion indices   SR / Hct 35.0/ Lactate 2.4  Hydralazine PO for BP management   Rate control with Lopressor and mexiletine   Wound VAC on rt elbow intact, changed 11/11  [x] AC Therapy with Eliquis 5 BID for Afib and IJ thrombus  Stopped daily blood work (T/Th/Sat)    ***Pulmonary***  Trach decannulated 11/12/22   Daily X-rays changed to (T/Th/Sat)  [x] Stable on RA  Encourage incentive spirometry, continue pulse ox monitoring, follow ABGs   Continue duonebs  Suction as needed   Aggressive Pulmonary toilet        ***GI***  [x] Diet: TFs Glucerna 1.2 @ goal 65ml/hr, tolerating without issues (when glucerna 1.5 back in stock will switch and decrease goal rate to 55ml/hr)  [x] Protonix    Reglan for gut motility     ***Renal***  Continue to monitor I/Os, BUN/Creatinine.   Replete lytes PRN    ***ID***  SCx on 10/4+Methicillin resistant Staphylococcus aureus, c/w IVPB Vancomycin, (plan for 6 week course in setting of valve surgery, 11/26 end date)  9/27 blood culture Neela Glabarata, on flucanazole (lifelong suppression)  BCx 10/13 NGTD, BC 10/21 and SCx NGTD  R elbow wound, S/p IR for elbow drainage 10/13 + Few Proteus mirabilis ESBL, Meropenem 7 day trial completed 10/19-> reordered 10/20 for reaccumulation of elbow bursitis-  10/22 plastics debrided R elbow eschar to subc tissue, ~8cc fluid aspirated and sent for culture. VAC changes done by PT   Joint Fluid Cx 10/22 - p. mirabilis s/p Meropenem for 8 day course  cont vanco (until 11/26 for 6 week course) and fluconazole for lifelong suppression  needs to be on isolation until vanco complete and then re culture 3 days later and if clear can be off isolation  Arrow catheter replaced on 11/12    ***Endocrine***  [x]  DM : HbA1c 9.4%                - [x] ISS  [x] NPH              - Need tight glycemic control to prevent wound infection.  Endocrine following, appreciate recommendations        Patient requires continuous monitoring with bedside rhythm monitoring, pulse oximetry monitoring. Care plan discussed with the ICU care team.           By signing my name below, I, Bharti Barrios, attest that this documentation has been prepared under the direction and in the presence of KIANA Garzon.  Electronically signed: Georgi Gottlieb, 11-14-22 @ 07:22    I, Dejuan Perry, personally performed the services described in this documentation. all medical record entries made by the scribe were at my direction and in my presence. I have reviewed the chart and agree that the record reflects my personal performance and is accurate and complete  Electronically signed: KIANA Garzon.

## 2022-11-14 NOTE — PROGRESS NOTE ADULT - PROBLEM SELECTOR PLAN 1
-test BG q6h if NPO/TF   -NPH doses as follows:   12am: c/w NPH 4 units.    6am: Change NPH dose to 32 units. Give 50% of dose if BG less than 120mg/dl. DO NOT HOLD IF RECEIVING TF  12pm: No NPH insulin     6pm: No NPH dose for now.   -If TFs off after NPH is given please start D5 infusion at TF rate to prevent rebound hypoglycemia.   -C/w Admelog moderate correction scale q6h for now  -Will adjust as needed  Discharge plan:  - Likely to discharge patient on basal/bolus insulin. Final regimen pending clinical course.  - Recommend routine outpatient ophthalmology, podiatry  - Can f/u with endocrinologist Dr. Saavedra.  - Make sure pt has Rx for all DM supplies and insulin/ DM meds.  -Based on pt's clinical condition and mental status at this time she is not able to independently manage her DM. Will evaluate pt's ability to manage DM at time of discharge. If unable> pt will need family/ care giver (s) to manage DM care at home  -Will need rehab.

## 2022-11-14 NOTE — PROGRESS NOTE ADULT - SUBJECTIVE AND OBJECTIVE BOX
DIABETES FOLLOW UP NOTE: Saw pt earlier today    Chief Complaint: Endocrine consult requested for management of T2DM    INTERVAL HX: Saw ot in CTU. Per staff tolerating 24 hour TFs of Glucerna at 65cc/hr. BG levels between 100s to 300s in the last 24 hours. Noted BG 300s at 12noon for the past 2 days. Pt states feeling better  every day. On chronic Prednisone 8mg for AI. On antibiotics for sepsis. Pt able to talk via trach voice valve and states she took her dentures off because now they are very loose. Alos reports pain in R side of mouth radiating to R ear.     Review of Systems:  General: As above  Cardiovascular: No chest pain, palpitations  Respiratory: No SOB, no cough  GI: No nausea, vomiting, abdominal pain  Endocrine: no polyuria, polydipsia or S&Sx of hypoglycemia    Allergies    aspirin (Short breath)  Avelox (Short breath; Pruritus)  cefepime (Anaphylaxis)  codeine (Short breath)  Dilaudid (Short breath)  iodine (Short breath; Swelling)  penicillin (Anaphylaxis)  shellfish (Anaphylaxis)  tetanus toxoid (Short breath)  Valium (Short breath)    Intolerances      MEDICATIONS:  fluconAZOLE IVPB 600 milliGRAM(s) IV Intermittent every 24 hours  insulin lispro (ADMELOG) corrective regimen sliding scale   SubCutaneous every 6 hours  insulin NPH human recombinant 4 Unit(s) SubCutaneous <User Schedule>  insulin NPH human recombinant 28 Unit(s) SubCutaneous <User Schedule>  methylPREDNISolone 8 milliGRAM(s) Oral daily  vancomycin  IVPB 750 milliGRAM(s) IV Intermittent every 12 hours           PHYSICAL EXAM:  VITALS: T(C): 36.7 (11-14-22 @ 17:30)  T(F): 98.1 (11-14-22 @ 17:30), Max: 99.4 (11-13-22 @ 20:00)  HR: 84 (11-14-22 @ 17:30) (74 - 90)  BP: 136/58 (11-14-22 @ 17:30) (104/46 - 172/64)  RR:  (19 - 41)  SpO2:  (95% - 100%)  Wt(kg): --  GENERAL: Female sitting in chair in NAD.   HEENT: Trach in place with voice valve, NGT with TFs on at time of visit.   Chest: Sternal incision healed   Abdomen: Soft, nontender, non distended  Extremities: Warm, no edema in all 4 exts. RUE wound to vac with whitish foamy out put.   NEURO: Alert, able to answer simple questions      LABS:  POCT Blood Glucose.: 305 mg/dL (11-14-22 @ 13:53)  POCT Blood Glucose.: 198 mg/dL (11-14-22 @ 05:34)  POCT Blood Glucose.: 151 mg/dL (11-14-22 @ 00:23)  POCT Blood Glucose.: 168 mg/dL (11-13-22 @ 17:26)  POCT Blood Glucose.: 307 mg/dL (11-13-22 @ 13:07)  POCT Blood Glucose.: 164 mg/dL (11-13-22 @ 05:58)  POCT Blood Glucose.: 222 mg/dL (11-12-22 @ 23:12)  POCT Blood Glucose.: 156 mg/dL (11-12-22 @ 17:49)  POCT Blood Glucose.: 185 mg/dL (11-12-22 @ 12:03)  POCT Blood Glucose.: 173 mg/dL (11-12-22 @ 06:46)  POCT Blood Glucose.: 128 mg/dL (11-11-22 @ 23:23)                            10.6   12.47 )-----------( 272      ( 13 Nov 2022 00:17 )             35.0       11-13    136  |  100  |  18  ----------------------------<  231<H>  4.7   |  23  |  0.48<L>    eGFR: 99    Ca    9.0      11-13  Mg     1.8     11-13  Phos  3.9     11-13    TPro  7.0  /  Alb  3.2<L>  /  TBili  0.2  /  DBili  x   /  AST  27  /  ALT  16  /  AlkPhos  123<H>  11-13    A1C with Estimated Average Glucose Result: 9.4 % (09-06-22 @ 16:46)      Estimated Average Glucose: 223 mg/dL (09-06-22 @ 16:46)

## 2022-11-14 NOTE — PROGRESS NOTE ADULT - NUTRITIONAL ASSESSMENT

## 2022-11-14 NOTE — PROGRESS NOTE ADULT - SUBJECTIVE AND OBJECTIVE BOX
CHIEF COMPLAINT:  f/up sob, chronic resp failure, TBM, severe persistent asthma, VC dysfunction, Type A aortic dissection s/p repair w/modified "Bentall procedure and hemiarch replacement 9/6-finally decannulated-on RA, feels well    Interval Events: swallow pend then rehab    REVIEW OF SYSTEMS:  Constitutional: No fevers or chills. No weight loss. No fatigue or generalized malaise.  Eyes: No itching or discharge from the eyes  ENT: No ear pain. No ear discharge. No nasal congestion. No post nasal drip. No epistaxis. No throat pain. No sore throat. No difficulty swallowing.   CV: No chest pain. No palpitations. No lightheadedness or dizziness.   Resp: No dyspnea at rest. No dyspnea on exertion. No orthopnea. No wheezing. No cough. No stridor. + sputum production. No chest pain with respiration.  GI: No nausea. No vomiting. No diarrhea.  MSK: No joint pain or pain in any extremities  Integumentary: No skin lesions. No pedal edema.  Neurological: No gross motor weakness. No sensory changes.  [+ ] All other systems negative  [ ] Unable to assess ROS because ________    OBJECTIVE:  ICU Vital Signs Last 24 Hrs  T(C): 36.8 (14 Nov 2022 00:00), Max: 37.4 (13 Nov 2022 20:00)  T(F): 98.3 (14 Nov 2022 00:00), Max: 99.4 (13 Nov 2022 20:00)  HR: 81 (14 Nov 2022 03:00) (66 - 96)  BP: 104/46 (14 Nov 2022 03:00) (104/46 - 172/64)  BP(mean): 67 (14 Nov 2022 03:00) (67 - 100)  ABP: --  ABP(mean): --  RR: 37 (14 Nov 2022 03:00) (19 - 42)  SpO2: 96% (14 Nov 2022 03:00) (90% - 100%)    O2 Parameters below as of 14 Nov 2022 00:00  Patient On (Oxygen Delivery Method): room air              11-12 @ 07:01  -  11-13 @ 07:00  --------------------------------------------------------  IN: 2450 mL / OUT: 1600 mL / NET: 850 mL    11-13 @ 07:01 - 11-14 @ 04:55  --------------------------------------------------------  IN: 2165 mL / OUT: 500 mL / NET: 1665 mL      CAPILLARY BLOOD GLUCOSE  156 (12 Nov 2022 18:00)      POCT Blood Glucose.: 151 mg/dL (14 Nov 2022 00:23)      PHYSICAL EXAM: NAD in bed on RA/NGT  General: Awake, alert, oriented X 3.   HEENT: Atraumatic, normocephalic.                 Mallampatti Grade 3                No nasal congestion.                No tonsillar or pharyngeal exudates.  Lymph Nodes: No palpable lymphadenopathy  Neck: No JVD. No carotid bruit.   Respiratory: Normal chest expansion                         Normal percussion                         Normal and equal air entry                         No wheeze,+ rhonchi or rales.  Cardiovascular: S1 S2 normal. No murmurs, rubs or gallops.   Abdomen: Soft, non-tender, non-distended. No organomegaly. Normoactive bowel sounds.  Extremities: Warm to touch. Peripheral pulse palpable. No pedal edema.   Skin: No rashes or skin lesions  Neurological: Motor and sensory examination equal and normal in all four extremities.  Psychiatry: Appropriate mood and affect.    HOSPITAL MEDICATIONS:  MEDICATIONS  (STANDING):  acetylcysteine 10%  Inhalation 4 milliLiter(s) Inhalation every 12 hours  albuterol/ipratropium for Nebulization 3 milliLiter(s) Nebulizer every 6 hours  apixaban 5 milliGRAM(s) Enteral Tube every 12 hours  ascorbic acid 500 milliGRAM(s) Oral daily  buDESOnide    Inhalation Suspension 0.5 milliGRAM(s) Inhalation every 12 hours  chlorhexidine 2% Cloths 1 Application(s) Topical <User Schedule>  collagenase Ointment 1 Application(s) Topical daily  diphenhydramine 2%/zinc acetate 0.1% Cream 1 Application(s) Topical every 8 hours  fluconAZOLE IVPB 600 milliGRAM(s) IV Intermittent every 24 hours  hydrALAZINE 50 milliGRAM(s) Oral three times a day  insulin lispro (ADMELOG) corrective regimen sliding scale   SubCutaneous every 6 hours  insulin NPH human recombinant 4 Unit(s) SubCutaneous <User Schedule>  insulin NPH human recombinant 28 Unit(s) SubCutaneous <User Schedule>  magnesium oxide 400 milliGRAM(s) Oral every 8 hours  methylPREDNISolone 8 milliGRAM(s) Oral daily  metoclopramide Injectable 5 milliGRAM(s) IV Push every 8 hours  metoprolol tartrate 12.5 milliGRAM(s) Oral two times a day  mexiletine 200 milliGRAM(s) Oral every 8 hours  multivitamin 1 Tablet(s) Oral daily  nystatin Powder 1 Application(s) Topical two times a day  pantoprazole  Injectable 40 milliGRAM(s) IV Push daily  vancomycin  IVPB 750 milliGRAM(s) IV Intermittent every 12 hours  vancomycin  IVPB        MEDICATIONS  (PRN):  acetaminophen    Suspension .. 650 milliGRAM(s) Oral every 6 hours PRN Temp greater or equal to 38C (100.4F), Mild Pain (1 - 3)  calamine/zinc oxide Lotion 1 Application(s) Topical every 8 hours PRN Itching      LABS:                        10.6   12.47 )-----------( 272      ( 13 Nov 2022 00:17 )             35.0     11-13    136  |  100  |  18  ----------------------------<  231<H>  4.7   |  23  |  0.48<L>    Ca    9.0      13 Nov 2022 00:17  Phos  3.9     11-13  Mg     1.8     11-13    TPro  7.0  /  Alb  3.2<L>  /  TBili  0.2  /  DBili  x   /  AST  27  /  ALT  16  /  AlkPhos  123<H>  11-13          Venous Blood Gas:  11-12 @ 13:25  7.44/47/32/32/53.6  VBG Lactate: 2.4      MICROBIOLOGY:     RADIOLOGY:  [ ] Reviewed and interpreted by me    Point of Care Ultrasound Findings:    PFT:    EKG:

## 2022-11-15 DIAGNOSIS — R78.81 BACTEREMIA: ICD-10-CM

## 2022-11-15 LAB
ALBUMIN SERPL ELPH-MCNC: 3.2 G/DL — LOW (ref 3.3–5)
ALP SERPL-CCNC: 113 U/L — SIGNIFICANT CHANGE UP (ref 40–120)
ALT FLD-CCNC: 16 U/L — SIGNIFICANT CHANGE UP (ref 10–45)
ANION GAP SERPL CALC-SCNC: 12 MMOL/L — SIGNIFICANT CHANGE UP (ref 5–17)
ANION GAP SERPL CALC-SCNC: 12 MMOL/L — SIGNIFICANT CHANGE UP (ref 5–17)
AST SERPL-CCNC: 15 U/L — SIGNIFICANT CHANGE UP (ref 10–40)
BASOPHILS # BLD AUTO: 0.03 K/UL — SIGNIFICANT CHANGE UP (ref 0–0.2)
BASOPHILS NFR BLD AUTO: 0.2 % — SIGNIFICANT CHANGE UP (ref 0–2)
BILIRUB SERPL-MCNC: 0.2 MG/DL — SIGNIFICANT CHANGE UP (ref 0.2–1.2)
BUN SERPL-MCNC: 15 MG/DL — SIGNIFICANT CHANGE UP (ref 7–23)
BUN SERPL-MCNC: 15 MG/DL — SIGNIFICANT CHANGE UP (ref 7–23)
CALCIUM SERPL-MCNC: 9.1 MG/DL — SIGNIFICANT CHANGE UP (ref 8.4–10.5)
CALCIUM SERPL-MCNC: 9.1 MG/DL — SIGNIFICANT CHANGE UP (ref 8.4–10.5)
CHLORIDE SERPL-SCNC: 100 MMOL/L — SIGNIFICANT CHANGE UP (ref 96–108)
CHLORIDE SERPL-SCNC: 100 MMOL/L — SIGNIFICANT CHANGE UP (ref 96–108)
CO2 SERPL-SCNC: 26 MMOL/L — SIGNIFICANT CHANGE UP (ref 22–31)
CO2 SERPL-SCNC: 27 MMOL/L — SIGNIFICANT CHANGE UP (ref 22–31)
CREAT SERPL-MCNC: 0.42 MG/DL — LOW (ref 0.5–1.3)
CREAT SERPL-MCNC: 0.43 MG/DL — LOW (ref 0.5–1.3)
EGFR: 102 ML/MIN/1.73M2 — SIGNIFICANT CHANGE UP
EGFR: 103 ML/MIN/1.73M2 — SIGNIFICANT CHANGE UP
EOSINOPHIL # BLD AUTO: 0.33 K/UL — SIGNIFICANT CHANGE UP (ref 0–0.5)
EOSINOPHIL NFR BLD AUTO: 2.7 % — SIGNIFICANT CHANGE UP (ref 0–6)
GLUCOSE BLDC GLUCOMTR-MCNC: 200 MG/DL — HIGH (ref 70–99)
GLUCOSE BLDC GLUCOMTR-MCNC: 220 MG/DL — HIGH (ref 70–99)
GLUCOSE BLDC GLUCOMTR-MCNC: 263 MG/DL — HIGH (ref 70–99)
GLUCOSE SERPL-MCNC: 195 MG/DL — HIGH (ref 70–99)
GLUCOSE SERPL-MCNC: 197 MG/DL — HIGH (ref 70–99)
HCT VFR BLD CALC: 33.6 % — LOW (ref 34.5–45)
HGB BLD-MCNC: 9.9 G/DL — LOW (ref 11.5–15.5)
IMM GRANULOCYTES NFR BLD AUTO: 0.8 % — SIGNIFICANT CHANGE UP (ref 0–0.9)
LYMPHOCYTES # BLD AUTO: 1.32 K/UL — SIGNIFICANT CHANGE UP (ref 1–3.3)
LYMPHOCYTES # BLD AUTO: 10.8 % — LOW (ref 13–44)
MAGNESIUM SERPL-MCNC: 1.7 MG/DL — SIGNIFICANT CHANGE UP (ref 1.6–2.6)
MCHC RBC-ENTMCNC: 22.8 PG — LOW (ref 27–34)
MCHC RBC-ENTMCNC: 29.5 GM/DL — LOW (ref 32–36)
MCV RBC AUTO: 77.4 FL — LOW (ref 80–100)
MONOCYTES # BLD AUTO: 0.72 K/UL — SIGNIFICANT CHANGE UP (ref 0–0.9)
MONOCYTES NFR BLD AUTO: 5.9 % — SIGNIFICANT CHANGE UP (ref 2–14)
NEUTROPHILS # BLD AUTO: 9.77 K/UL — HIGH (ref 1.8–7.4)
NEUTROPHILS NFR BLD AUTO: 79.6 % — HIGH (ref 43–77)
NRBC # BLD: 0 /100 WBCS — SIGNIFICANT CHANGE UP (ref 0–0)
PHOSPHATE SERPL-MCNC: 3.3 MG/DL — SIGNIFICANT CHANGE UP (ref 2.5–4.5)
PLATELET # BLD AUTO: 278 K/UL — SIGNIFICANT CHANGE UP (ref 150–400)
POTASSIUM SERPL-MCNC: 3.8 MMOL/L — SIGNIFICANT CHANGE UP (ref 3.5–5.3)
POTASSIUM SERPL-MCNC: 3.8 MMOL/L — SIGNIFICANT CHANGE UP (ref 3.5–5.3)
POTASSIUM SERPL-SCNC: 3.8 MMOL/L — SIGNIFICANT CHANGE UP (ref 3.5–5.3)
POTASSIUM SERPL-SCNC: 3.8 MMOL/L — SIGNIFICANT CHANGE UP (ref 3.5–5.3)
PROT SERPL-MCNC: 6.9 G/DL — SIGNIFICANT CHANGE UP (ref 6–8.3)
RBC # BLD: 4.34 M/UL — SIGNIFICANT CHANGE UP (ref 3.8–5.2)
RBC # FLD: 20.2 % — HIGH (ref 10.3–14.5)
SODIUM SERPL-SCNC: 138 MMOL/L — SIGNIFICANT CHANGE UP (ref 135–145)
SODIUM SERPL-SCNC: 139 MMOL/L — SIGNIFICANT CHANGE UP (ref 135–145)
WBC # BLD: 12.27 K/UL — HIGH (ref 3.8–10.5)
WBC # FLD AUTO: 12.27 K/UL — HIGH (ref 3.8–10.5)

## 2022-11-15 PROCEDURE — 99232 SBSQ HOSP IP/OBS MODERATE 35: CPT

## 2022-11-15 PROCEDURE — 71045 X-RAY EXAM CHEST 1 VIEW: CPT | Mod: 26

## 2022-11-15 RX ORDER — HUMAN INSULIN 100 [IU]/ML
5 INJECTION, SUSPENSION SUBCUTANEOUS
Refills: 0 | Status: DISCONTINUED | OUTPATIENT
Start: 2022-11-15 | End: 2022-11-16

## 2022-11-15 RX ORDER — FLUCONAZOLE 150 MG/1
400 TABLET ORAL DAILY
Refills: 0 | Status: DISCONTINUED | OUTPATIENT
Start: 2022-11-16 | End: 2022-11-22

## 2022-11-15 RX ORDER — HUMAN INSULIN 100 [IU]/ML
40 INJECTION, SUSPENSION SUBCUTANEOUS
Refills: 0 | Status: DISCONTINUED | OUTPATIENT
Start: 2022-11-16 | End: 2022-11-16

## 2022-11-15 RX ORDER — SPIRONOLACTONE 25 MG/1
25 TABLET, FILM COATED ORAL DAILY
Refills: 0 | Status: DISCONTINUED | OUTPATIENT
Start: 2022-11-15 | End: 2022-11-15

## 2022-11-15 RX ORDER — FUROSEMIDE 40 MG
20 TABLET ORAL DAILY
Refills: 0 | Status: DISCONTINUED | OUTPATIENT
Start: 2022-11-15 | End: 2022-11-15

## 2022-11-15 RX ADMIN — Medication 8 MILLIGRAM(S): at 06:35

## 2022-11-15 RX ADMIN — Medication 12.5 MILLIGRAM(S): at 17:38

## 2022-11-15 RX ADMIN — HUMAN INSULIN 32 UNIT(S): 100 INJECTION, SUSPENSION SUBCUTANEOUS at 06:37

## 2022-11-15 RX ADMIN — NYSTATIN CREAM 1 APPLICATION(S): 100000 CREAM TOPICAL at 17:38

## 2022-11-15 RX ADMIN — Medication 1 TABLET(S): at 13:42

## 2022-11-15 RX ADMIN — MAGNESIUM OXIDE 400 MG ORAL TABLET 400 MILLIGRAM(S): 241.3 TABLET ORAL at 13:51

## 2022-11-15 RX ADMIN — SODIUM CHLORIDE 3 MILLILITER(S): 9 INJECTION INTRAMUSCULAR; INTRAVENOUS; SUBCUTANEOUS at 06:32

## 2022-11-15 RX ADMIN — SODIUM CHLORIDE 3 MILLILITER(S): 9 INJECTION INTRAMUSCULAR; INTRAVENOUS; SUBCUTANEOUS at 13:41

## 2022-11-15 RX ADMIN — Medication 4 MILLILITER(S): at 17:59

## 2022-11-15 RX ADMIN — Medication 3 MILLILITER(S): at 06:34

## 2022-11-15 RX ADMIN — Medication 12.5 MILLIGRAM(S): at 06:35

## 2022-11-15 RX ADMIN — Medication 50 MILLIGRAM(S): at 13:52

## 2022-11-15 RX ADMIN — Medication 1 APPLICATION(S): at 13:42

## 2022-11-15 RX ADMIN — MAGNESIUM OXIDE 400 MG ORAL TABLET 400 MILLIGRAM(S): 241.3 TABLET ORAL at 06:36

## 2022-11-15 RX ADMIN — PANTOPRAZOLE SODIUM 40 MILLIGRAM(S): 20 TABLET, DELAYED RELEASE ORAL at 13:51

## 2022-11-15 RX ADMIN — HUMAN INSULIN 4 UNIT(S): 100 INJECTION, SUSPENSION SUBCUTANEOUS at 00:06

## 2022-11-15 RX ADMIN — Medication 0.5 MILLIGRAM(S): at 17:37

## 2022-11-15 RX ADMIN — NYSTATIN CREAM 1 APPLICATION(S): 100000 CREAM TOPICAL at 06:37

## 2022-11-15 RX ADMIN — CHLORHEXIDINE GLUCONATE 1 APPLICATION(S): 213 SOLUTION TOPICAL at 10:01

## 2022-11-15 RX ADMIN — Medication 2: at 17:35

## 2022-11-15 RX ADMIN — MEXILETINE HYDROCHLORIDE 200 MILLIGRAM(S): 150 CAPSULE ORAL at 22:26

## 2022-11-15 RX ADMIN — Medication 250 MILLIGRAM(S): at 11:17

## 2022-11-15 RX ADMIN — Medication 50 MILLIGRAM(S): at 06:36

## 2022-11-15 RX ADMIN — Medication 5 MILLIGRAM(S): at 13:51

## 2022-11-15 RX ADMIN — Medication 6: at 12:13

## 2022-11-15 RX ADMIN — Medication 3 MILLILITER(S): at 12:13

## 2022-11-15 RX ADMIN — Medication 50 MILLIGRAM(S): at 22:27

## 2022-11-15 RX ADMIN — Medication 500 MILLIGRAM(S): at 13:52

## 2022-11-15 RX ADMIN — Medication 3 MILLILITER(S): at 00:08

## 2022-11-15 RX ADMIN — Medication 3 MILLILITER(S): at 17:37

## 2022-11-15 RX ADMIN — MEXILETINE HYDROCHLORIDE 200 MILLIGRAM(S): 150 CAPSULE ORAL at 06:35

## 2022-11-15 RX ADMIN — Medication 5 MILLIGRAM(S): at 06:36

## 2022-11-15 RX ADMIN — Medication 4 MILLILITER(S): at 06:35

## 2022-11-15 RX ADMIN — APIXABAN 5 MILLIGRAM(S): 2.5 TABLET, FILM COATED ORAL at 06:36

## 2022-11-15 RX ADMIN — APIXABAN 5 MILLIGRAM(S): 2.5 TABLET, FILM COATED ORAL at 17:38

## 2022-11-15 RX ADMIN — MEXILETINE HYDROCHLORIDE 200 MILLIGRAM(S): 150 CAPSULE ORAL at 13:52

## 2022-11-15 RX ADMIN — MAGNESIUM OXIDE 400 MG ORAL TABLET 400 MILLIGRAM(S): 241.3 TABLET ORAL at 22:27

## 2022-11-15 RX ADMIN — Medication 0.5 MILLIGRAM(S): at 06:34

## 2022-11-15 RX ADMIN — Medication 1 APPLICATION(S): at 12:13

## 2022-11-15 NOTE — PROGRESS NOTE ADULT - ASSESSMENT

## 2022-11-15 NOTE — PROGRESS NOTE ADULT - PROBLEM SELECTOR PLAN 5
MRSA bacteremia last + culture 9/28  last positive Candida blood culture 9/30  MRSA  bacteremia and candidemia  Per Simon plan to treat for 6 weeks in the setting of post surgical repair of thoracic aorta dissection through 11/15/22 after which she may need chronic oral suppression with Bactrim plus fluconazole

## 2022-11-15 NOTE — SWALLOW BEDSIDE ASSESSMENT ADULT - COMMENTS
History continued:    9/12 CT CHEST IMPRESSION:  1.  Status post recent ascending aortic dissection repair. Small amount   of fluid surrounding the ascending aorta without evidence of enhancing   wall to suggest abscess.  2.  Bilateral lower lobe atelectasis with bilateral pleural effusions.  3.  Mild thickening of the cecum and ascending colon possibly   representing mild nonspecific proximal colitis.  4.  Hepatic cirrhosis.  5.  The focal IPMN of the pancreas with large cyst within the tail the   pancreas measuring 3.1 cm. Follow-up recommended.    9/14 CXR IMPRESSION: Interval removal of endotracheal tube. Decreased small left pleural effusion.    Pt known to this service. Patient seen for multiple bedside swallow evaluations during previous admissions. Seen for FEES 3/31/22 -->recommendations for puree/thin lquids due to trace penetration of minced and moist trial without immediate retrieval. 5/12/2022 MBS --> recommendations for regular solids/thin liquids, no laryngeal penetration/aspiration seen across textures.  During this admission, consulted 9/9/22, however pt currently intubated.
11/13 CXR IMPRESSION: New, trace left pleural effusion.    Seen by this service 9/14 for a bedside swallow evaluation with recommendations for soft-bite sized solids/mildly thick liquids. Plan for MBS pending transfer to floor. However, pt transferred to PICU and developed new baseline cough, leukocytosis, intermittent delirium and agitation, and ongoing workup for infection. Seen for follow up 9/16 with recommendations for NPO, with non-oral nutrition/hydration/medications pending workup and improvement in mentation. Tentative plan for FEES 9/20. FEES completed 9/20 with recommendations for regular solids/thin liquids.   Actively being followed by this service since 11/2 for speaking-valve evaluation. Pt cleared for PMV use.

## 2022-11-15 NOTE — PROGRESS NOTE ADULT - SUBJECTIVE AND OBJECTIVE BOX
CHIEF COMPLAINT:  f/up sob, chronic resp failure, TBM, severe persistent asthma, VC dysfunction, Type A aortic dissection s/p repair w/modified "Bentall procedure and hemiarch replacement 9/6-finally decannulated-on RA, feels well  -wants flu/shingles vax-wants to eat    Interval Events: tx to floor    REVIEW OF SYSTEMS:  Constitutional: No fevers or chills. No weight loss. No fatigue or generalized malaise.  Eyes: No itching or discharge from the eyes  ENT: No ear pain. No ear discharge. No nasal congestion. No post nasal drip. No epistaxis. No throat pain. No sore throat. No difficulty swallowing.   CV: No chest pain. No palpitations. No lightheadedness or dizziness.   Resp: No dyspnea at rest. No dyspnea on exertion. No orthopnea. No wheezing. No cough. No stridor. + sputum production. No chest pain with respiration.  GI: No nausea. No vomiting. No diarrhea.  MSK: No joint pain or pain in any extremities  Integumentary: No skin lesions. No pedal edema.  Neurological: No gross motor weakness. No sensory changes.  +[ ] All other systems negative  [ ] Unable to assess ROS because ________    OBJECTIVE:  ICU Vital Signs Last 24 Hrs  T(C): 36.7 (14 Nov 2022 17:30), Max: 36.8 (14 Nov 2022 08:00)  T(F): 98.1 (14 Nov 2022 17:30), Max: 98.3 (14 Nov 2022 08:00)  HR: 84 (14 Nov 2022 17:30) (74 - 84)  BP: 136/58 (14 Nov 2022 17:30) (105/47 - 136/58)  BP(mean): 72 (14 Nov 2022 14:00) (65 - 79)  ABP: --  ABP(mean): --  RR: 22 (14 Nov 2022 17:30) (20 - 41)  SpO2: 97% (14 Nov 2022 17:30) (96% - 100%)    O2 Parameters below as of 14 Nov 2022 17:30  Patient On (Oxygen Delivery Method): room air              11-13 @ 07:01  -  11-14 @ 07:00  --------------------------------------------------------  IN: 2410 mL / OUT: 700 mL / NET: 1710 mL    11-14 @ 07:01  -  11-15 @ 04:50  --------------------------------------------------------  IN: 905 mL / OUT: 400 mL / NET: 505 mL      CAPILLARY BLOOD GLUCOSE  198 (14 Nov 2022 06:00)      POCT Blood Glucose.: 168 mg/dL (14 Nov 2022 23:52)      PHYSICAL EXAM: NAD in bed on RA  General: Awake, alert, oriented X 3.   HEENT: Atraumatic, normocephalic.                 Mallampatti Grade 3                No nasal congestion.                No tonsillar or pharyngeal exudates.  Lymph Nodes: No palpable lymphadenopathy  Neck: No JVD. No carotid bruit.   Respiratory: abnormal chest expansion                         Normal percussion                         Normal and equal air entry                         No wheeze,++ rhonchi or rales.  Cardiovascular: S1 S2 normal. No murmurs, rubs or gallops.   Abdomen: Soft, non-tender, non-distended. No organomegaly. Normoactive bowel sounds.  Extremities: Warm to touch. Peripheral pulse palpable. No pedal edema.   Skin: No rashes or skin lesions  Neurological: Motor and sensory examination equal and normal in all four extremities.  Psychiatry: Appropriate mood and affect.    HOSPITAL MEDICATIONS:  MEDICATIONS  (STANDING):  acetylcysteine 10%  Inhalation 4 milliLiter(s) Inhalation every 12 hours  albuterol/ipratropium for Nebulization 3 milliLiter(s) Nebulizer every 6 hours  apixaban 5 milliGRAM(s) Enteral Tube every 12 hours  ascorbic acid 500 milliGRAM(s) Oral daily  buDESOnide    Inhalation Suspension 0.5 milliGRAM(s) Inhalation every 12 hours  chlorhexidine 2% Cloths 1 Application(s) Topical <User Schedule>  collagenase Ointment 1 Application(s) Topical daily  diphenhydramine 2%/zinc acetate 0.1% Cream 1 Application(s) Topical every 8 hours  fluconAZOLE IVPB 600 milliGRAM(s) IV Intermittent every 24 hours  hydrALAZINE 50 milliGRAM(s) Oral three times a day  insulin lispro (ADMELOG) corrective regimen sliding scale   SubCutaneous every 6 hours  insulin NPH human recombinant 4 Unit(s) SubCutaneous <User Schedule>  insulin NPH human recombinant 32 Unit(s) SubCutaneous <User Schedule>  magnesium oxide 400 milliGRAM(s) Oral every 8 hours  methylPREDNISolone 8 milliGRAM(s) Oral daily  metoclopramide Injectable 5 milliGRAM(s) IV Push every 8 hours  metoprolol tartrate 12.5 milliGRAM(s) Oral two times a day  mexiletine 200 milliGRAM(s) Oral every 8 hours  multivitamin 1 Tablet(s) Oral daily  nystatin Powder 1 Application(s) Topical two times a day  pantoprazole  Injectable 40 milliGRAM(s) IV Push daily  sodium chloride 0.9% lock flush 3 milliLiter(s) IV Push every 8 hours  vancomycin  IVPB 750 milliGRAM(s) IV Intermittent every 12 hours  vancomycin  IVPB        MEDICATIONS  (PRN):  acetaminophen    Suspension .. 650 milliGRAM(s) Oral every 6 hours PRN Temp greater or equal to 38C (100.4F), Mild Pain (1 - 3)  calamine/zinc oxide Lotion 1 Application(s) Topical every 8 hours PRN Itching      LABS:                    MICROBIOLOGY:     RADIOLOGY:  [ ] Reviewed and interpreted by me    Point of Care Ultrasound Findings:    PFT:    EKG:

## 2022-11-15 NOTE — PROGRESS NOTE ADULT - NSPROGADDITIONALINFOA_GEN_ALL_CORE
-Plan discussed with pt/team. Spoke to CTU team to f/u pt c/o R mouth/ear pain.   Contact info: [phone] (24/7). pager [phone]  [email or web address] password [email or web address]  Teams  Spent 30 minutes assessing pt/labs/meds and discussing plan of care with primary team  Adjusting insulin  Discharge plan  Follow up care

## 2022-11-15 NOTE — SWALLOW BEDSIDE ASSESSMENT ADULT - H & P REVIEW
71F PMH DM, COPD, Chronic Adrenal Insufficiency on Chronic prednisone, history of colorectal cancer s/p resection (colostomy bag), Hx of CAD, Chronic A fib on Eliquis, and tracheomalacia and multiple intubations, recent dx of OM presented to ED at [facility] this morning with epigastric pain, belching and central chest pain. Found on CTA to have type A aortic dissection and transferred to [facility] for surgical evaluation by Dr. Cabrera./yes
CONTACT - 7/6/22 MRSA R FOOT
71F PMH DM, COPD, Chronic Adrenal Insufficiency on Chronic prednisone, history of colorectal cancer s/p resection (colostomy bag), Hx of CAD, Chronic A fib on Eliquis, and tracheomalacia s/p tracheoplasty, and multiple intubations, recent dx of OM presented to ED at [facility] this morning with epigastric pain, belching and central chest pain. Found on CTA to have type A aortic dissection and transferred to [facility] for surgical evaluation by Dr. Cabrera. Pt admitted for ascending aortic dissection. Patient went to OR for emergency type A aortic dissection repair with Dr. Cabrera. Extubated post-op on 9/7. On 9/8 pt developed increasingly labored breathing and due to mild delirium and depressed mental status post operatively, patient was reintubated. Extubated 9/13. Passed FEES 9/20 and started on a regular diet/thin liquids, NGT remained in place w/ TF. Intubated 9/28 s/p emesis episode on nocturnal NGT feeds, s/p 10/11 trachestomy. 10/13 weaning to trach collar trials, tolerating TC overnight since 10/25. Decannulated 11/12.

## 2022-11-15 NOTE — PROGRESS NOTE ADULT - SUBJECTIVE AND OBJECTIVE BOX
DIABETES FOLLOW UP NOTE: Saw pt earlier today    Chief Complaint: Endocrine consult requested for management of T2DM    INTERVAL HX: Saw on step down cardiac unit. Per staff tolerating 24 hour TFs of Glucerna at 55cc/hr. BG levels not at goal with BG still elevated at 12noon  and at 6am in the last 24 hours. No hypoglycemia. Pt decannulated and  states feeling better every day. On chronic Prednisone 8mg for AI. On antibiotics for sepsis. Still c/o reports pain in R side of mouth radiating to R ear.           Review of Systems:  General: As above  Cardiovascular: No chest pain, palpitations  Respiratory: No SOB, no cough  GI: No nausea, vomiting, abdominal pain  Endocrine: no polyuria, polydipsia or S&Sx of hypoglycemia    Allergies    aspirin (Short breath)  Avelox (Short breath; Pruritus)  cefepime (Anaphylaxis)  codeine (Short breath)  Dilaudid (Short breath)  iodine (Short breath; Swelling)  penicillin (Anaphylaxis)  shellfish (Anaphylaxis)  tetanus toxoid (Short breath)  Valium (Short breath)    Intolerances      MEDICATIONS:  fluconAZOLE IVPB 600 milliGRAM(s) IV Intermittent every 24 hours  insulin lispro (ADMELOG) corrective regimen sliding scale   SubCutaneous every 6 hours  insulin NPH human recombinant 4 Unit(s) SubCutaneous <User Schedule>  insulin NPH human recombinant 32 Unit(s) SubCutaneous <User Schedule>  methylPREDNISolone 8 milliGRAM(s) Oral daily  vancomycin  IVPB 750 milliGRAM(s) IV Intermittent every 12 hours        PHYSICAL EXAM:  VITALS: T(C): 36.6 (11-15-22 @ 13:00)  T(F): 97.9 (11-15-22 @ 13:00), Max: 98 (11-15-22 @ 06:00)  HR: 80 (11-15-22 @ 13:00) (78 - 80)  BP: 115/69 (11-15-22 @ 13:00) (115/69 - 127/67)  RR:  (18 - 18)  SpO2:  (97% - 98%)  Wt(kg): --  GENERAL: Female sitting in chair in NAD.   HEENT: Small dressing noted in former trach site D&I, NGT with TFs on at time of visit.   Chest: Sternal incision healed   Abdomen: Soft, nontender, non distended  Extremities: Warm, no edema in all 4 exts. RUE wound to vac with whitish foamy out put.   NEURO: Alert, able to answer simple questions      LABS:  POCT Blood Glucose.: 200 mg/dL (11-15-22 @ 16:42)  POCT Blood Glucose.: 263 mg/dL (11-15-22 @ 12:06)  POCT Blood Glucose.: 220 mg/dL (11-15-22 @ 06:10)  POCT Blood Glucose.: 168 mg/dL (11-14-22 @ 23:52)  POCT Blood Glucose.: 136 mg/dL (11-14-22 @ 17:56)  POCT Blood Glucose.: 305 mg/dL (11-14-22 @ 13:53)  POCT Blood Glucose.: 198 mg/dL (11-14-22 @ 05:34)  POCT Blood Glucose.: 151 mg/dL (11-14-22 @ 00:23)  POCT Blood Glucose.: 168 mg/dL (11-13-22 @ 17:26)  POCT Blood Glucose.: 307 mg/dL (11-13-22 @ 13:07)  POCT Blood Glucose.: 164 mg/dL (11-13-22 @ 05:58)  POCT Blood Glucose.: 222 mg/dL (11-12-22 @ 23:12)  POCT Blood Glucose.: 156 mg/dL (11-12-22 @ 17:49)                            9.9    12.27 )-----------( 278      ( 15 Nov 2022 09:31 )             33.6       11-15    139  |  100  |  15  ----------------------------<  195<H>  3.8   |  27  |  0.42<L>    eGFR: 103    Ca    9.1      11-15  Mg     1.7     11-15  Phos  3.3     11-15    TPro  6.9  /  Alb  3.2<L>  /  TBili  0.2  /  DBili  x   /  AST  15  /  ALT  16  /  AlkPhos  113  11-15      A1C with Estimated Average Glucose Result: 9.4 % (09-06-22 @ 16:46)      Estimated Average Glucose: 223 mg/dL (09-06-22 @ 16:46)

## 2022-11-15 NOTE — PROGRESS NOTE ADULT - ASSESSMENT
71 year-old female with a history of DM2, CAD, A-Fib on apixaban, Severe Persistent Asthma (on chronic steroids, recently started on Tezspire), colon cancer s/p resection/chemo, and tracheobronchomalacia s/p tracheoplasty, and recent OM of the R foot s/b debridement and completed course of Vancomycin/Ertapenem for MRSA/ESBL Proteus/Corynebacterium who now presents with chest pain, found to have Type A dissection s/p repair with modified Bentall procedure and hemiarch replacement on 9/6/22. Post-op course complicated by acute hypoxemic respiratory failure, shock, anemia, and hyperglycemia requiring insulin gtt. Extubated 9/13, now awake and alert with mild encephalopathy but overall significantly improved.    Assessment:  Acute hypoxemic respiratory failure requiring intubation  Type A Aortic Dissection  Severe Persistent Asthma  History of Tracheobronchomalacia  fevers and MRSA bacteremia and tracheal aspirate material with MRSA    -leukocytosis resolved   MRSA bacteremia last + culture 9/28  last positive Candida blood culture 9/30  Continue IV vancomycin- trough to be repeated  Right elbow area grew ESBL Proteus - treated with a course of meropenem and a wound vac with improvement in swelling and no involvement of the joint space reported        MRSA  bacteremia and candidemia  Will plan to treat for 6 weeks in the setting of post surgical repair of thoracic aorta dissection through 11/15/22 after which she may need chronic oral suppression with Bactrim plus fluconazole  Continue to follow CBC  Continue to follow creatinine  Continue to follow Vanco trough levels Vancomycin Level, Trough: 5 would repeat    blood cultures for evidence of fungemia and bacteremia clearance showed evidence of clearing of infections on 10/1 and once IV antibiotics are completed will discuss oral suppression options with fluconazole 400mg and Bactrim DS daily   ESBL proteus in elbow area fluid sensitive to meropenem and aspiration 10/22 grew proteus (reportedly superficial and not involving bursa or joint)  completed meropenem        Jeff Calixto MD  Can be called via Teams  After 5pm/weekends [phone]       71 year-old female with a history of DM2, CAD, A-Fib on apixaban, Severe Persistent Asthma (on chronic steroids, recently started on Tezspire), colon cancer s/p resection/chemo, and tracheobronchomalacia s/p tracheoplasty, and recent OM of the R foot s/b debridement and completed course of Vancomycin/Ertapenem for MRSA/ESBL Proteus/Corynebacterium who now presents with chest pain, found to have Type A dissection s/p repair with modified Bentall procedure and hemiarch replacement on 9/6/22. Post-op course complicated by acute hypoxemic respiratory failure, shock, anemia, and hyperglycemia requiring insulin gtt. Extubated 9/13, now awake and alert with mild encephalopathy but overall significantly improved.    Assessment:  Acute hypoxemic respiratory failure requiring intubation  Type A Aortic Dissection  Severe Persistent Asthma  History of Tracheobronchomalacia  fevers and MRSA bacteremia and tracheal aspirate material with MRSA    -leukocytosis resolved   MRSA bacteremia last + culture 9/28  last positive Candida blood culture 9/30  Continue IV vancomycin- trough to be repeated  Right elbow area grew ESBL Proteus - treated with a course of meropenem and a wound vac with improvement in swelling and no involvement of the joint space reported        MRSA  bacteremia and candidemia  Will plan to treat for 6 weeks in the setting of post surgical repair of thoracic aorta dissection through 11/15/22 after which she may need chronic oral suppression with Bactrim plus fluconazole  Continue to follow CBC  Continue to follow creatinine  Continue to follow Vanco trough levels Vancomycin Level, Trough: 5 would repeat    blood cultures for evidence of fungemia and bacteremia clearance showed evidence of clearing of infections on 10/1 and IV antibiotics are completed will discuss oral suppression options with fluconazole 400mg and Bactrim DS daily   ESBL proteus in elbow area fluid sensitive to meropenem and aspiration 10/22 grew proteus (reportedly superficial and not involving bursa or joint)  completed meropenem        Jeff Calixto MD  Can be called via Teams  After 5pm/weekends [phone]

## 2022-11-15 NOTE — PROGRESS NOTE ADULT - PROBLEM SELECTOR PLAN 1
-test BG q6h if NPO/TF   -NPH doses as follows:   12am: c/w NPH 5 units.    6am: Change NPH dose to 40 units. Give 50% of dose if BG less than 120mg/dl. DO NOT HOLD IF RECEIVING TF  12pm: No NPH insulin     6pm: No NPH dose for now.   -If TFs off after NPH is given please start D5 infusion at TF rate to prevent rebound hypoglycemia.   -C/w Admelog moderate correction scale q6h for now  -Will adjust as needed  Discharge plan:  - Likely to discharge patient on basal/bolus insulin. Final regimen pending clinical course.  - Recommend routine outpatient ophthalmology, podiatry  - Can f/u with endocrinologist Dr. Saavedra.  - Make sure pt has Rx for all DM supplies and insulin/ DM meds.  -Based on pt's clinical condition and mental status at this time she is not able to independently manage her DM. Will evaluate pt's ability to manage DM at time of discharge. If unable> pt will need family/ care giver (s) to manage DM care at home  -Will need rehab.

## 2022-11-15 NOTE — PROGRESS NOTE ADULT - SUBJECTIVE AND OBJECTIVE BOX
INFECTIOUS DISEASES FOLLOW UP-- Fouzia Calixto  [phone]    This is a follow up note for this  72yFemale with  Dissection of thoracic aorta  ID following for prior MRSA bacteremia with candidemia        ROS:  CONSTITUTIONAL:  No fever, good appetite  CARDIOVASCULAR:  No chest pain or palpitations  RESPIRATORY:  No dyspnea  GASTROINTESTINAL:  No nausea, vomiting, diarrhea, or abdominal pain  GENITOURINARY:  No dysuria  NEUROLOGIC:  No headache,     Allergies    aspirin (Short breath)  Avelox (Short breath; Pruritus)  cefepime (Anaphylaxis)  codeine (Short breath)  Dilaudid (Short breath)  iodine (Short breath; Swelling)  penicillin (Anaphylaxis)  shellfish (Anaphylaxis)  tetanus toxoid (Short breath)  Valium (Short breath)    Intolerances        ANTIBIOTICS/RELEVANT:  antimicrobials  fluconAZOLE IVPB 600 milliGRAM(s) IV Intermittent every 24 hours  vancomycin  IVPB 750 milliGRAM(s) IV Intermittent every 12 hours  vancomycin  IVPB        immunologic:    OTHER:  acetaminophen    Suspension .. 650 milliGRAM(s) Oral every 6 hours PRN  acetylcysteine 10%  Inhalation 4 milliLiter(s) Inhalation every 12 hours  albuterol/ipratropium for Nebulization 3 milliLiter(s) Nebulizer every 6 hours  apixaban 5 milliGRAM(s) Enteral Tube every 12 hours  ascorbic acid 500 milliGRAM(s) Oral daily  buDESOnide    Inhalation Suspension 0.5 milliGRAM(s) Inhalation every 12 hours  calamine/zinc oxide Lotion 1 Application(s) Topical every 8 hours PRN  chlorhexidine 2% Cloths 1 Application(s) Topical <User Schedule>  collagenase Ointment 1 Application(s) Topical daily  diphenhydramine 2%/zinc acetate 0.1% Cream 1 Application(s) Topical every 8 hours  hydrALAZINE 50 milliGRAM(s) Oral three times a day  insulin lispro (ADMELOG) corrective regimen sliding scale   SubCutaneous every 6 hours  insulin NPH human recombinant 32 Unit(s) SubCutaneous <User Schedule>  insulin NPH human recombinant 4 Unit(s) SubCutaneous <User Schedule>  magnesium oxide 400 milliGRAM(s) Oral every 8 hours  methylPREDNISolone 8 milliGRAM(s) Oral daily  metoclopramide Injectable 5 milliGRAM(s) IV Push every 8 hours  metoprolol tartrate 12.5 milliGRAM(s) Oral two times a day  mexiletine 200 milliGRAM(s) Oral every 8 hours  multivitamin 1 Tablet(s) Oral daily  nystatin Powder 1 Application(s) Topical two times a day  pantoprazole  Injectable 40 milliGRAM(s) IV Push daily  sodium chloride 0.9% lock flush 3 milliLiter(s) IV Push every 8 hours      Objective:  Vital Signs Last 24 Hrs  T(C): 36.6 (15 Nov 2022 13:00), Max: 36.7 (15 Nov 2022 06:00)  T(F): 97.9 (15 Nov 2022 13:00), Max: 98 (15 Nov 2022 06:00)  HR: 80 (15 Nov 2022 13:00) (78 - 80)  BP: 115/69 (15 Nov 2022 13:00) (115/69 - 127/67)  BP(mean): --  RR: 18 (15 Nov 2022 13:00) (18 - 18)  SpO2: 97% (15 Nov 2022 13:00) (97% - 98%)    Parameters below as of 15 Nov 2022 13:00  Patient On (Oxygen Delivery Method): room air        PHYSICAL EXAM:  Constitutional:no acute distress  Eyes:EBER, EOMI  Ear/Nose/Throat: no oral lesions, 	  Respiratory: clear BL  Cardiovascular: S1S2  Gastrointestinal:soft, (+) BS, no tenderness  Extremities:no e/e/c  No Lymphadenopathy  IV sites not inflammed.    LABS:                        9.9    12.27 )-----------( 278      ( 15 Nov 2022 09:31 )             33.6     11-15    139  |  100  |  15  ----------------------------<  195<H>  3.8   |  27  |  0.42<L>    Ca    9.1      15 Nov 2022 09:31  Phos  3.3     11-15  Mg     1.7     11-15    TPro  6.9  /  Alb  3.2<L>  /  TBili  0.2  /  DBili  x   /  AST  15  /  ALT  16  /  AlkPhos  113  11-15          MICROBIOLOGY:    COVID-19 PCR: NotDetec: You can help in the fight against COVID-19. [facility] may contact  you to see if you are interested in voluntarily participating in one of  our clinical trials.  Testing is performed using polymerase chain reaction (PCR) or  transcription mediated amplification (TMA). This COVID-19 (SARS-CoV-2)  nucleic acid amplification test was validated by [facility] and is  in use under the FDA Emergency Use Authorization (EUA) for clinical labs  CLIA-certified to perform high complexity testing. Test results should be  correlated with clinical presentation, patient history, and epidemiology. (11.13.22 @ 03:16)            RECENT CULTURES:      RADIOLOGY & ADDITIONAL STUDIES:    < from: Xray Chest 1 View- PORTABLE-Routine (Xray Chest 1 View- PORTABLE-Routine in AM.) (11.15.22 @ 06:47) >  IMPRESSION:  Mild right basilar linear atelectasis.    Trace right pleural effusion.    < end of copied text >   INFECTIOUS DISEASES FOLLOW UP-- Fouzia Calixto  [phone]    This is a follow up note for this  72yFemale with  Dissection of thoracic aorta  ID following for prior MRSA bacteremia with candidemia        ROS:  CONSTITUTIONAL:  No fever, good appetite  CARDIOVASCULAR:  No chest pain or palpitations  RESPIRATORY:  No dyspnea  GASTROINTESTINAL:  No nausea, vomiting, diarrhea, or abdominal pain  GENITOURINARY:  No dysuria  NEUROLOGIC:  No headache,     Allergies    aspirin (Short breath)  Avelox (Short breath; Pruritus)  cefepime (Anaphylaxis)  codeine (Short breath)  Dilaudid (Short breath)  iodine (Short breath; Swelling)  penicillin (Anaphylaxis)  shellfish (Anaphylaxis)  tetanus toxoid (Short breath)  Valium (Short breath)    Intolerances        ANTIBIOTICS/RELEVANT:  antimicrobials  fluconAZOLE IVPB 600 milliGRAM(s) IV Intermittent every 24 hours  vancomycin  IVPB 750 milliGRAM(s) IV Intermittent every 12 hours  vancomycin  IVPB        immunologic:    OTHER:  acetaminophen    Suspension .. 650 milliGRAM(s) Oral every 6 hours PRN  acetylcysteine 10%  Inhalation 4 milliLiter(s) Inhalation every 12 hours  albuterol/ipratropium for Nebulization 3 milliLiter(s) Nebulizer every 6 hours  apixaban 5 milliGRAM(s) Enteral Tube every 12 hours  ascorbic acid 500 milliGRAM(s) Oral daily  buDESOnide    Inhalation Suspension 0.5 milliGRAM(s) Inhalation every 12 hours  calamine/zinc oxide Lotion 1 Application(s) Topical every 8 hours PRN  chlorhexidine 2% Cloths 1 Application(s) Topical <User Schedule>  collagenase Ointment 1 Application(s) Topical daily  diphenhydramine 2%/zinc acetate 0.1% Cream 1 Application(s) Topical every 8 hours  hydrALAZINE 50 milliGRAM(s) Oral three times a day  insulin lispro (ADMELOG) corrective regimen sliding scale   SubCutaneous every 6 hours  insulin NPH human recombinant 32 Unit(s) SubCutaneous <User Schedule>  insulin NPH human recombinant 4 Unit(s) SubCutaneous <User Schedule>  magnesium oxide 400 milliGRAM(s) Oral every 8 hours  methylPREDNISolone 8 milliGRAM(s) Oral daily  metoclopramide Injectable 5 milliGRAM(s) IV Push every 8 hours  metoprolol tartrate 12.5 milliGRAM(s) Oral two times a day  mexiletine 200 milliGRAM(s) Oral every 8 hours  multivitamin 1 Tablet(s) Oral daily  nystatin Powder 1 Application(s) Topical two times a day  pantoprazole  Injectable 40 milliGRAM(s) IV Push daily  sodium chloride 0.9% lock flush 3 milliLiter(s) IV Push every 8 hours      Objective:  Vital Signs Last 24 Hrs  T(C): 36.6 (15 Nov 2022 13:00), Max: 36.7 (15 Nov 2022 06:00)  T(F): 97.9 (15 Nov 2022 13:00), Max: 98 (15 Nov 2022 06:00)  HR: 80 (15 Nov 2022 13:00) (78 - 80)  BP: 115/69 (15 Nov 2022 13:00) (115/69 - 127/67)  BP(mean): --  RR: 18 (15 Nov 2022 13:00) (18 - 18)  SpO2: 97% (15 Nov 2022 13:00) (97% - 98%)    Parameters below as of 15 Nov 2022 13:00  Patient On (Oxygen Delivery Method): room air        PHYSICAL EXAM:  Constitutional:no acute distress  Eyes:EBER, EOMI  Ear/Nose/Throat: no oral lesions, trach site healing	  Respiratory: clear BL sternotomy site healed  Cardiovascular: S1S2  Gastrointestinal:soft, (+) BS, no tenderness  Extremities:no e/e/c  No Lymphadenopathy  IV sites not inflammed.    LABS:                        9.9    12.27 )-----------( 278      ( 15 Nov 2022 09:31 )             33.6     11-15    139  |  100  |  15  ----------------------------<  195<H>  3.8   |  27  |  0.42<L>    Ca    9.1      15 Nov 2022 09:31  Phos  3.3     11-15  Mg     1.7     11-15    TPro  6.9  /  Alb  3.2<L>  /  TBili  0.2  /  DBili  x   /  AST  15  /  ALT  16  /  AlkPhos  113  11-15          MICROBIOLOGY:    COVID-19 PCR: NotDetec: You can help in the fight against COVID-19. [facility] may contact  you to see if you are interested in voluntarily participating in one of  our clinical trials.  Testing is performed using polymerase chain reaction (PCR) or  transcription mediated amplification (TMA). This COVID-19 (SARS-CoV-2)  nucleic acid amplification test was validated by [employer] and is  in use under the FDA Emergency Use Authorization (EUA) for clinical labs  CLIA-certified to perform high complexity testing. Test results should be  correlated with clinical presentation, patient history, and epidemiology. (11.13.22 @ 03:16)            RECENT CULTURES:      RADIOLOGY & ADDITIONAL STUDIES:    < from: Xray Chest 1 View- PORTABLE-Routine (Xray Chest 1 View- PORTABLE-Routine in AM.) (11.15.22 @ 06:47) >  IMPRESSION:  Mild right basilar linear atelectasis.    Trace right pleural effusion.    < end of copied text >

## 2022-11-15 NOTE — PROGRESS NOTE ADULT - TIME BILLING
as above: decannulated stable-tx floor-resp stable-speaking well, ambulating daily/stronger over all  -ID f/up-resp stable--wound care f/up-TC continues- (she will always have secretions) due to TBM-s/p  trach 10/10-s/p  resp failure-s/p [place]-JDEO-zp ABX-stable CV status-re- VEST rx in use  multifactorial dyspnea-resp failure-severe persistent asthma, TBM s/p tracheoplasty, s/p Aortic aneurysm repair, Bronchitis (proteus)-O2-keep 90% (RA/NC)  severe persistent asthma--medrol 8mg, singulair 10, duoneb q 6, budes .5 bid, tezspire 8/29-was due 9/29-next upon DC  TBM-s/p tracheoplasty--accapella, vest rx, mucomyst here 2 cc 10% q 8 (secretions)  s/p Aortic aneurysm repair--as per CTS staff care  AF-on eliquis rx               CV-improved hydralazine/lopressor/mexilitine   DVT R-IJ-on heparin rx  *****ID-s/p proteus bronchitis-s/p meropenum changed to ceftriaxone and back to meropenem/vanco- off of ABX as of 9/19--restart of ABX 9/27-meropenem/vanco-s/p  meropenem but continues vanco for MRSE sepsis (11/26 end date for vanco), plastics/ortho for elbow-proteus (?wash out)-vac in place-suppressive rx-bact/fluconazole  PT-OOB as able                             GI-TF as able--f/up LFTs-normal-swallow evaln in progress       ****ORTHO f/up--? additional wash out of elbow wound?; wound care f/up  **VC dysfunction--aspiration precautions-s/p trach 10/10-ENT f/up s/p laryngoscopy- decannulated    Heme onc f/up colon ca  prog--fair                PT-to continue-more OOB     DC planning (flu shot/shingles etc.)    Eulalio Allison MD-Pulmonary   [phone]

## 2022-11-15 NOTE — PROGRESS NOTE ADULT - SUBJECTIVE AND OBJECTIVE BOX
VITAL SIGNS    Telemetry:    Vital Signs Last 24 Hrs  T(C): 36.7 (11-15-22 @ 06:00), Max: 36.8 (11-14-22 @ 08:00)  T(F): 98 (11-15-22 @ 06:00), Max: 98.3 (11-14-22 @ 08:00)  HR: 78 (11-15-22 @ 06:00) (75 - 84)  BP: 127/67 (11-15-22 @ 06:00) (107/48 - 136/58)  RR: 18 (11-15-22 @ 06:00) (18 - 24)  SpO2: 98% (11-15-22 @ 06:00) (96% - 100%)            11-14 @ 07:01  -  11-15 @ 07:00  --------------------------------------------------------  IN: 905 mL / OUT: 400 mL / NET: 505 mL       Daily     Daily   Admit Wt: Drug Dosing Weight  Height (cm): 170.2 (10 Oct 2022 13:39)  Weight (kg): 67 (10 Oct 2022 13:39)  BMI (kg/m2): 23.1 (10 Oct 2022 13:39)  BSA (m2): 1.78 (10 Oct 2022 13:39)     Daily     LABS                                CAPILLARY BLOOD GLUCOSE      POCT Blood Glucose.: 220 mg/dL (15 Nov 2022 06:10)  POCT Blood Glucose.: 168 mg/dL (14 Nov 2022 23:52)  POCT Blood Glucose.: 136 mg/dL (14 Nov 2022 17:56)  POCT Blood Glucose.: 305 mg/dL (14 Nov 2022 13:53)          Drains:     MS       [  ]   [  ]            L Pleural [  ]            R Pleural  [  ]            LACI  [  ]           Amezcua  [  ]    Pacing Wires      [  ]   Settings:                                  Isolated  [  ]                    CXR:      MEDICATIONS  acetaminophen    Suspension .. 650 milliGRAM(s) Oral every 6 hours PRN  acetylcysteine 10%  Inhalation 4 milliLiter(s) Inhalation every 12 hours  albuterol/ipratropium for Nebulization 3 milliLiter(s) Nebulizer every 6 hours  apixaban 5 milliGRAM(s) Enteral Tube every 12 hours  ascorbic acid 500 milliGRAM(s) Oral daily  buDESOnide    Inhalation Suspension 0.5 milliGRAM(s) Inhalation every 12 hours  calamine/zinc oxide Lotion 1 Application(s) Topical every 8 hours PRN  chlorhexidine 2% Cloths 1 Application(s) Topical <User Schedule>  collagenase Ointment 1 Application(s) Topical daily  diphenhydramine 2%/zinc acetate 0.1% Cream 1 Application(s) Topical every 8 hours  fluconAZOLE IVPB 600 milliGRAM(s) IV Intermittent every 24 hours  hydrALAZINE 50 milliGRAM(s) Oral three times a day  insulin lispro (ADMELOG) corrective regimen sliding scale   SubCutaneous every 6 hours  insulin NPH human recombinant 4 Unit(s) SubCutaneous <User Schedule>  insulin NPH human recombinant 32 Unit(s) SubCutaneous <User Schedule>  magnesium oxide 400 milliGRAM(s) Oral every 8 hours  methylPREDNISolone 8 milliGRAM(s) Oral daily  metoclopramide Injectable 5 milliGRAM(s) IV Push every 8 hours  metoprolol tartrate 12.5 milliGRAM(s) Oral two times a day  mexiletine 200 milliGRAM(s) Oral every 8 hours  multivitamin 1 Tablet(s) Oral daily  nystatin Powder 1 Application(s) Topical two times a day  pantoprazole  Injectable 40 milliGRAM(s) IV Push daily  sodium chloride 0.9% lock flush 3 milliLiter(s) IV Push every 8 hours  vancomycin  IVPB 750 milliGRAM(s) IV Intermittent every 12 hours  vancomycin  IVPB          PHYSICAL EXAM      Neurology: alert and oriented x 3, nonfocal, no gross deficits  CV :S1S2  Sternal Wound :  CDI , Stable  Lungs: cta  Abdomen: soft, nontender, nondistended, positive bowel sounds, last bowel movement   :    voids   Extremities:   warm to touch               PAST MEDICAL & SURGICAL HISTORY:  Atrial fibrillation  paroxysmal, on eliquis      Diabetes  Type 2      COPD (chronic obstructive pulmonary disease)      Adrenal insufficiency  Medrol daily for over 50 years      Aortic insufficiency  moderate AR on echo 5/3/2018      Pelvic fracture      Asthma      Tracheobronchomalacia  diagnosed 2015, s/p bronchial thermoplasty 2016 (Dr Zapien); recent bronchoscopy 6/5/2018 revealed no evidence of tracheobronchomalacia in trachea or bronchial tubes      Colorectal cancer  4/2018- last treatment , chemo and radiation      Rectal bleeding      Seizure  x 1 1/7/18      DVT (deep venous thrombosis)  15-20 years ago, took coumadin      TIA (transient ischemic attack)  multiple, last 5 years ago - presents as right-sided weakness      History of partial hysterectomy  30 years ago - fibroids      H/O total knee replacement, bilateral  5 years ago      History of sinus surgery  multiple sinus surgeries      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 by Dr Zapien      S/P bronchoscopy  6/5/2018 - [place] (Dr Zapien) no evidence of tracheobronchomalacia in trachea or bronchial tubes      Rectal bleeding  exam under anesthesia ([facility]) 2/2018                     Discussed with Cardiothoracic Team at AM rounds.

## 2022-11-15 NOTE — SWALLOW BEDSIDE ASSESSMENT ADULT - ASR SWALLOW RECOMMEND DIAG
MD, PLEASE ENTER ORDER FOR MODIFIED BARIUM SWALLOW STUDY (ENTER UNDER RADIOLOGY EXAMS THE FOLLOWING WAY: GO TO THE BROWSER AND TYPE IN XRAY, THEN HIT THE SPACEBAR, AND TYPE IN THE LETTER M.)  WILL SCHEDULE EXAM UPON RECEIPT IN RADIOLOGY./VFSS/MBS
ongoing assessment to determine candidacy

## 2022-11-15 NOTE — PROGRESS NOTE ADULT - ASSESSMENT

## 2022-11-15 NOTE — SWALLOW BEDSIDE ASSESSMENT ADULT - SLP PATIENT PROFILE REVIEW
Allergies: aspirin, Avelox, cefepime, codeine, Dilaudid, iodine, penicillin, tetanus toxoid, Valium, shellfish
yes
Contact [id number]/4/[id number]. Allergies: Aspirin, Avelox, cefepime, codeine, Dilaudid, iodine, penicillin, tetanus toxoid, valium, shellfish

## 2022-11-15 NOTE — SWALLOW BEDSIDE ASSESSMENT ADULT - SWALLOW EVAL: DIAGNOSIS
Chart reviewed, attempted to see pt for bedside swallow evaluation however pt currently intubated. This service will not actively follow. Please reconsult when clinically appropriate.
71F PMH h/o COPD, tracheomalacia s/p tracheoplasty, and multiple intubations. Found on CTA to have type A aortic dissection s/p repair, intubated x2. Patient p/w evidence of a mild oropharyngeal dysphagia, likely acute related to recent extubation 9/13. Across puree, minced-moist, soft-bite solids, and mildly thick liquids, there is slowed however efficient mastication, delayed oral transit, mild latency in the swallow response, and palpable hyolaryngeal elevation/excursion. Across thin liquids via single cup sips, there is suspected reduced bolus control resulting in premature spillover, mild latency in the swallow response, palpable hyolaryngeal elevation/excursion, and audible deglutition liklely indicative of poor coordination of respiration and deglutition. There were no overt s/s aspiration observed across all textures administered. However, given recent extubation and current presentation across less viscous liquids, diet modification is warranted at this time.
71F PMH h/o COPD, tracheomalacia s/p tracheoplasty, and multiple intubations. Found on CTA to have type A aortic dissection s/p repair, multiple intubations throughout hospital course, s/p aspiration event 9/28 subsequently intubated and trach placed 10/11, decannulated 11/12. Pt p/w suspected oropharyngeal dysphagia. Instrumental assessment warranted prior to initiation of a PO diet given h/o dysphagia, intubation 9/28 s/p emesis episode on nocturnal NGT feeds resulting in tracheostomy, now decannulated 11/12.

## 2022-11-15 NOTE — SWALLOW BEDSIDE ASSESSMENT ADULT - SLP GENERAL OBSERVATIONS
Pt encountered upright in bed, on room air, + NGT, awake/alert, Aox3. Stoma partially closed. Pt encountered upright in bed, on room air, + NGT, awake/alert, Aox3. Stoma partially closed. Pt able to follow multistep directives and answer open ended questions for purposes of exam.

## 2022-11-15 NOTE — SWALLOW BEDSIDE ASSESSMENT ADULT - ASR SWALLOW DENTITION
edentulous; upper/lower dentures present at bedside. However, pt endorsed upper dentures causing discomfort when placed on R upper gum. No edema or erythema visualized.
upper/lower dentures placed

## 2022-11-15 NOTE — PROGRESS NOTE ADULT - ASSESSMENT
72 F w/h/o uncontrolled T2DM (A1C 9.4%) on basal/bolus insulin PTA. Unknown DM complications. Also COPD, secondary adrenal Insufficiency on chronic steroids, colorectal cancer s/p resection (colostomy bag), Chronic A fib on Eliquis, and tracheomalacia and multiple intubations, recent dx of OM presented to ED at [facility] with epigastric pain, belching and central chest pain. Found on CTA to have type A aortic dissection and transferred to [facility] for surgical evaluation by Dr. Cabrera. Now s/p aortic dissection repair on 9/07/22, sepsis, and now s/p trach 10/10 and more recently s/p R elbow eschar debridement 10/22 > Wound VAC 10/24. Endocrine consulted for assistance with uncontrolled DM/steroids for AI. BG elevated at 12 noon and 6am so will adjust NPH doses at 12mn and 6am to BG goal 100 to 180s.   No hypoglycemia

## 2022-11-15 NOTE — SWALLOW BEDSIDE ASSESSMENT ADULT - ADDITIONAL RECOMMENDATIONS
Goals: maintain adequate oral hygiene
Goals:  1. Pt/family/caregiver will demonstrate understanding and carryover of dysphagia management (safe swallow guidelines, compensatory strategies, dysphagia diet).  2. Pt will tolerate recommended diet with no overt, clinical s/s of aspiration.

## 2022-11-16 LAB
GLUCOSE BLDC GLUCOMTR-MCNC: 116 MG/DL — HIGH (ref 70–99)
GLUCOSE BLDC GLUCOMTR-MCNC: 209 MG/DL — HIGH (ref 70–99)
GLUCOSE BLDC GLUCOMTR-MCNC: 56 MG/DL — LOW (ref 70–99)
GLUCOSE BLDC GLUCOMTR-MCNC: 78 MG/DL — SIGNIFICANT CHANGE UP (ref 70–99)
GLUCOSE BLDC GLUCOMTR-MCNC: 80 MG/DL — SIGNIFICANT CHANGE UP (ref 70–99)
GLUCOSE BLDC GLUCOMTR-MCNC: 89 MG/DL — SIGNIFICANT CHANGE UP (ref 70–99)
GLUCOSE BLDC GLUCOMTR-MCNC: 98 MG/DL — SIGNIFICANT CHANGE UP (ref 70–99)
GLUCOSE BLDC GLUCOMTR-MCNC: 98 MG/DL — SIGNIFICANT CHANGE UP (ref 70–99)

## 2022-11-16 PROCEDURE — 99232 SBSQ HOSP IP/OBS MODERATE 35: CPT

## 2022-11-16 PROCEDURE — 74230 X-RAY XM SWLNG FUNCJ C+: CPT | Mod: 26

## 2022-11-16 RX ORDER — DEXTROSE 50 % IN WATER 50 %
12.5 SYRINGE (ML) INTRAVENOUS ONCE
Refills: 0 | Status: DISCONTINUED | OUTPATIENT
Start: 2022-11-16 | End: 2022-11-22

## 2022-11-16 RX ORDER — HUMAN INSULIN 100 [IU]/ML
4 INJECTION, SUSPENSION SUBCUTANEOUS
Refills: 0 | Status: DISCONTINUED | OUTPATIENT
Start: 2022-11-16 | End: 2022-11-17

## 2022-11-16 RX ORDER — DEXTROSE 50 % IN WATER 50 %
25 SYRINGE (ML) INTRAVENOUS ONCE
Refills: 0 | Status: DISCONTINUED | OUTPATIENT
Start: 2022-11-16 | End: 2022-11-22

## 2022-11-16 RX ORDER — INSULIN LISPRO 100/ML
VIAL (ML) SUBCUTANEOUS
Refills: 0 | Status: DISCONTINUED | OUTPATIENT
Start: 2022-11-16 | End: 2022-11-20

## 2022-11-16 RX ORDER — GLUCAGON INJECTION, SOLUTION 0.5 MG/.1ML
1 INJECTION, SOLUTION SUBCUTANEOUS ONCE
Refills: 0 | Status: DISCONTINUED | OUTPATIENT
Start: 2022-11-16 | End: 2022-11-22

## 2022-11-16 RX ORDER — DEXTROSE 50 % IN WATER 50 %
15 SYRINGE (ML) INTRAVENOUS ONCE
Refills: 0 | Status: DISCONTINUED | OUTPATIENT
Start: 2022-11-16 | End: 2022-11-22

## 2022-11-16 RX ORDER — INSULIN LISPRO 100/ML
VIAL (ML) SUBCUTANEOUS
Refills: 0 | Status: DISCONTINUED | OUTPATIENT
Start: 2022-11-16 | End: 2022-11-22

## 2022-11-16 RX ADMIN — Medication 4 MILLILITER(S): at 17:53

## 2022-11-16 RX ADMIN — Medication 50 MILLIGRAM(S): at 14:03

## 2022-11-16 RX ADMIN — FLUCONAZOLE 400 MILLIGRAM(S): 150 TABLET ORAL at 12:43

## 2022-11-16 RX ADMIN — Medication 5 MILLIGRAM(S): at 21:55

## 2022-11-16 RX ADMIN — Medication 1 APPLICATION(S): at 13:01

## 2022-11-16 RX ADMIN — Medication 50 MILLIGRAM(S): at 06:12

## 2022-11-16 RX ADMIN — Medication 0.5 MILLIGRAM(S): at 06:13

## 2022-11-16 RX ADMIN — MEXILETINE HYDROCHLORIDE 200 MILLIGRAM(S): 150 CAPSULE ORAL at 21:54

## 2022-11-16 RX ADMIN — Medication 4 MILLILITER(S): at 06:13

## 2022-11-16 RX ADMIN — HUMAN INSULIN 40 UNIT(S): 100 INJECTION, SUSPENSION SUBCUTANEOUS at 06:41

## 2022-11-16 RX ADMIN — Medication 3 MILLILITER(S): at 00:48

## 2022-11-16 RX ADMIN — SODIUM CHLORIDE 3 MILLILITER(S): 9 INJECTION INTRAMUSCULAR; INTRAVENOUS; SUBCUTANEOUS at 06:29

## 2022-11-16 RX ADMIN — SODIUM CHLORIDE 3 MILLILITER(S): 9 INJECTION INTRAMUSCULAR; INTRAVENOUS; SUBCUTANEOUS at 21:50

## 2022-11-16 RX ADMIN — MEXILETINE HYDROCHLORIDE 200 MILLIGRAM(S): 150 CAPSULE ORAL at 14:04

## 2022-11-16 RX ADMIN — Medication 3 MILLILITER(S): at 23:53

## 2022-11-16 RX ADMIN — PANTOPRAZOLE SODIUM 40 MILLIGRAM(S): 20 TABLET, DELAYED RELEASE ORAL at 14:04

## 2022-11-16 RX ADMIN — Medication 5 MILLIGRAM(S): at 14:03

## 2022-11-16 RX ADMIN — Medication 0.5 MILLIGRAM(S): at 17:52

## 2022-11-16 RX ADMIN — Medication 500 MILLIGRAM(S): at 14:05

## 2022-11-16 RX ADMIN — Medication 3 MILLILITER(S): at 17:51

## 2022-11-16 RX ADMIN — SODIUM CHLORIDE 3 MILLILITER(S): 9 INJECTION INTRAMUSCULAR; INTRAVENOUS; SUBCUTANEOUS at 14:40

## 2022-11-16 RX ADMIN — HUMAN INSULIN 4 UNIT(S): 100 INJECTION, SUSPENSION SUBCUTANEOUS at 22:07

## 2022-11-16 RX ADMIN — Medication 4: at 06:39

## 2022-11-16 RX ADMIN — APIXABAN 5 MILLIGRAM(S): 2.5 TABLET, FILM COATED ORAL at 06:12

## 2022-11-16 RX ADMIN — MAGNESIUM OXIDE 400 MG ORAL TABLET 400 MILLIGRAM(S): 241.3 TABLET ORAL at 21:54

## 2022-11-16 RX ADMIN — Medication 12.5 MILLIGRAM(S): at 17:53

## 2022-11-16 RX ADMIN — NYSTATIN CREAM 1 APPLICATION(S): 100000 CREAM TOPICAL at 17:55

## 2022-11-16 RX ADMIN — Medication 3 MILLILITER(S): at 06:14

## 2022-11-16 RX ADMIN — HUMAN INSULIN 5 UNIT(S): 100 INJECTION, SUSPENSION SUBCUTANEOUS at 00:47

## 2022-11-16 RX ADMIN — MAGNESIUM OXIDE 400 MG ORAL TABLET 400 MILLIGRAM(S): 241.3 TABLET ORAL at 14:04

## 2022-11-16 RX ADMIN — Medication 250 MILLIGRAM(S): at 01:57

## 2022-11-16 RX ADMIN — Medication 8 MILLIGRAM(S): at 06:12

## 2022-11-16 RX ADMIN — MAGNESIUM OXIDE 400 MG ORAL TABLET 400 MILLIGRAM(S): 241.3 TABLET ORAL at 06:12

## 2022-11-16 RX ADMIN — Medication 3 MILLILITER(S): at 12:44

## 2022-11-16 RX ADMIN — CHLORHEXIDINE GLUCONATE 1 APPLICATION(S): 213 SOLUTION TOPICAL at 06:14

## 2022-11-16 RX ADMIN — Medication 5 MILLIGRAM(S): at 08:02

## 2022-11-16 RX ADMIN — APIXABAN 5 MILLIGRAM(S): 2.5 TABLET, FILM COATED ORAL at 17:53

## 2022-11-16 RX ADMIN — Medication 5 MILLIGRAM(S): at 01:56

## 2022-11-16 RX ADMIN — Medication 12.5 MILLIGRAM(S): at 06:12

## 2022-11-16 RX ADMIN — Medication 1 TABLET(S): at 12:43

## 2022-11-16 RX ADMIN — MEXILETINE HYDROCHLORIDE 200 MILLIGRAM(S): 150 CAPSULE ORAL at 06:45

## 2022-11-16 RX ADMIN — Medication 50 MILLIGRAM(S): at 21:54

## 2022-11-16 RX ADMIN — Medication 250 MILLIGRAM(S): at 14:01

## 2022-11-16 RX ADMIN — SODIUM CHLORIDE 3 MILLILITER(S): 9 INJECTION INTRAMUSCULAR; INTRAVENOUS; SUBCUTANEOUS at 02:15

## 2022-11-16 RX ADMIN — Medication 1 TABLET(S): at 14:05

## 2022-11-16 RX ADMIN — NYSTATIN CREAM 1 APPLICATION(S): 100000 CREAM TOPICAL at 06:13

## 2022-11-16 NOTE — SWALLOW VFSS/MBS ASSESSMENT ADULT - SLP PATIENT PROFILE REVIEW
Contact [id number]/4/[id number]. Allergies: Aspirin, Avelox, cefepime, codeine, Dilaudid, iodine, penicillin, tetanus toxoid, valium, shellfish

## 2022-11-16 NOTE — SWALLOW VFSS/MBS ASSESSMENT ADULT - ROSENBEK'S PENETRATION ASPIRATION SCALE
(1) no aspiration, contrast does not enter airway (1) tsp/single sips (3) straw sips (4) contrast contacts vocal cords, no residue remains (penetration) (5) contrast contacts vocal cords, visible residue remains (penetration)

## 2022-11-16 NOTE — SWALLOW VFSS/MBS ASSESSMENT ADULT - ORAL PHASE
piecemeal deglutition, spillover to valleculae/Reduced anterior - posterior transport/Uncontrolled bolus / spillover in dorothea-pharynx Spillover to valleculae/Reduced anterior - posterior transport/Uncontrolled bolus / spillover in dorothea-pharynx Spillover to valleculae with mild spillover to pyriform sinuses/Delayed oral transit time/Reduced anterior - posterior transport spillover to valleculae/Reduced anterior - posterior transport/Uncontrolled bolus / spillover in dorothea-pharynx Mod spillover to valleculae with mild spillover to pyriform sinuses/Delayed oral transit time/Reduced anterior - posterior transport piecemeal deglutition, mild oral residue/Reduced anterior - posterior transport

## 2022-11-16 NOTE — CONSULT NOTE ADULT - PROVIDER SPECIALTY LIST ADULT
Dental
Intervent Radiology
Pulmonology
Intervent Radiology
Orthopedics
Endocrinology
Infectious Disease
Intervent Radiology
Orthopedics
Podiatry
Ophthalmology
Plastic Surgery
ENT

## 2022-11-16 NOTE — PROGRESS NOTE ADULT - ASSESSMENT
72 F w/h/o uncontrolled T2DM (A1C 9.4%) on basal/bolus insulin PTA. Unknown DM complications. Also COPD, secondary adrenal Insufficiency on chronic steroids, colorectal cancer s/p resection (colostomy bag), Chronic A fib on Eliquis, and tracheomalacia and multiple intubations, recent dx of OM presented to ED at [facility] with epigastric pain, belching and central chest pain. Found on CTA to have type A aortic dissection and transferred to [facility] for surgical evaluation by Dr. Cabrera. Now s/p aortic dissection repair on 9/07/22, sepsis, and now s/p trach 10/10 and more recently s/p R elbow eschar debridement 10/22 > Wound VAC 10/24. Endocrine consulted for assistance with uncontrolled DM/steroids for AI. BG variable while on TFs and now with hypoglycemia after pt passed MBS and TF were discontinued. Pt to have night time TFs starting at 10pm for 9 hours (til 7am). Spoke to team NP about plan of care . Will need to change insulin regimen according to PO intake/TF and to keep BG goal 100 to 180s without hypoglycemia

## 2022-11-16 NOTE — SWALLOW VFSS/MBS ASSESSMENT ADULT - SLP GENERAL OBSERVATIONS
Pt encountered upright in EVAN chair, on room air, + NGT, awake/alert, Aox3. Stoma partially closed. Pt able to follow multistep directives and answer open ended questions for purposes of exam. Of note, prior to exam, pt stated some mild SOB, SpO2 96%. SpO2 remained stable throughout evaluation. Pt encountered upright in EVAN chair, on room air, + NGT, awake/alert, Aox3. Stoma partially closed. Pt able to follow multistep directives and answer open ended questions for purposes of exam. Of note, prior to exam, pt stated some mild SOB, SpO2 96%. SpO2 remained stable throughout evaluation. Of note, lower dentures in place, pt unable to wear upper dentures at this time due to discomfort.

## 2022-11-16 NOTE — SWALLOW VFSS/MBS ASSESSMENT ADULT - DIAGNOSTIC IMPRESSIONS
71F PMH h/o COPD, tracheomalacia s/p tracheoplasty, s/p Type A aortic dissection, multiple intubations throughout hospital course, s/p aspiration event 9/28 subsequently intubated and trach placed 10/11, decannulated 11/12. Patient p/w oropharyngeal, likely acute related to prolonged hospitalization and recent decannulation. Silent laryngeal penetration appreciated before the swallow with thin liquids and mildly thick liquids via straw sips due to reduced bolus control and poor airway closure. No aspiration appreciated on examination across all consistencies trialed. Solids not trialed given patient's inability to wear upper dentures due to discomfort, however pending patient's ability to wear upper dentures, can reassess solid trials at the bedside. Patient is a good candidate for behavioral swallow rehabilitation given intact cognition, ambulatory, and good family support.     Disorders: reduced lingual strength/control, delayed pharyngeal trigger, reduced hyo-laryngeal elevation/excursion, partial epiglottic retroflexion, reduced laryngeal vestibule closure, reduced supraglottic sensation.

## 2022-11-16 NOTE — SWALLOW VFSS/MBS ASSESSMENT ADULT - MODE OF PRESENTATION
spoon/fed by clinician cup/spoon/straw/self fed/fed by clinician cup/spoon/self fed fed by clinician

## 2022-11-16 NOTE — SWALLOW VFSS/MBS ASSESSMENT ADULT - ORAL PHASE COMMENTS
Straw sips-->mild-moderate silent laryngeal penetration over the laryngeal surface of the epiglottis and AE folds without complete retriveal. Trace remains in laryngeal vestibule. Straw sips-->mild-moderate silent laryngeal penetration over the laryngeal surface of the epiglottis and AE folds; not fully ejected. Trace remains in laryngeal vestibule. Tsp/straw sips-->mild-moderate silent laryngeal penetration over the AE folds to the level of the vocal cords; not fully ejected. Trace remains in laryngeal vestibule. Tsp/straw sips-->mild-moderate silent laryngeal penetration before the swallow over the AE folds to the level of the vocal cords; not fully ejected. Trace remains in laryngeal vestibule. Straw sips-->mild-moderate silent laryngeal penetration before the swallow over the laryngeal surface of the epiglottis and AE folds; not fully ejected. Trace remains in laryngeal vestibule.

## 2022-11-16 NOTE — PROGRESS NOTE ADULT - SUBJECTIVE AND OBJECTIVE BOX
INFECTIOUS DISEASES FOLLOW UP-- Fouzia Marily  [phone]    This is a follow up note for this  72yFemale with  Dissection of thoracic aorta        ROS:  CONSTITUTIONAL:  No fever, good appetite  CARDIOVASCULAR:  No chest pain or palpitations  RESPIRATORY:  No dyspnea  GASTROINTESTINAL:  No nausea, vomiting, diarrhea, or abdominal pain  GENITOURINARY:  No dysuria  NEUROLOGIC:  No headache,     Allergies    aspirin (Short breath)  Avelox (Short breath; Pruritus)  cefepime (Anaphylaxis)  codeine (Short breath)  Dilaudid (Short breath)  iodine (Short breath; Swelling)  penicillin (Anaphylaxis)  shellfish (Anaphylaxis)  tetanus toxoid (Short breath)  Valium (Short breath)    Intolerances        ANTIBIOTICS/RELEVANT:  antimicrobials  fluconAZOLE   Tablet 400 milliGRAM(s) Oral daily  trimethoprim   80 mG/sulfamethoxazole 400 mG 1 Tablet(s) Oral daily    immunologic:    OTHER:  acetaminophen    Suspension .. 650 milliGRAM(s) Oral every 6 hours PRN  acetylcysteine 10%  Inhalation 4 milliLiter(s) Inhalation every 12 hours  albuterol/ipratropium for Nebulization 3 milliLiter(s) Nebulizer every 6 hours  apixaban 5 milliGRAM(s) Enteral Tube every 12 hours  ascorbic acid 500 milliGRAM(s) Oral daily  buDESOnide    Inhalation Suspension 0.5 milliGRAM(s) Inhalation every 12 hours  calamine/zinc oxide Lotion 1 Application(s) Topical every 8 hours PRN  chlorhexidine 2% Cloths 1 Application(s) Topical <User Schedule>  collagenase Ointment 1 Application(s) Topical daily  dextrose 50% Injectable 25 Gram(s) IV Push once  dextrose 50% Injectable 12.5 Gram(s) IV Push once  dextrose 50% Injectable 25 Gram(s) IV Push once  dextrose Oral Gel 15 Gram(s) Oral once  diphenhydramine 2%/zinc acetate 0.1% Cream 1 Application(s) Topical every 8 hours  glucagon  Injectable 1 milliGRAM(s) IntraMuscular once  hydrALAZINE 50 milliGRAM(s) Oral three times a day  insulin lispro (ADMELOG) corrective regimen sliding scale   SubCutaneous three times a day before meals  insulin lispro (ADMELOG) corrective regimen sliding scale   SubCutaneous <User Schedule>  insulin NPH human recombinant 4 Unit(s) SubCutaneous <User Schedule>  magnesium oxide 400 milliGRAM(s) Oral every 8 hours  methylPREDNISolone 8 milliGRAM(s) Oral daily  metoclopramide Injectable 5 milliGRAM(s) IV Push every 8 hours  metoprolol tartrate 12.5 milliGRAM(s) Oral two times a day  mexiletine 200 milliGRAM(s) Oral every 8 hours  multivitamin 1 Tablet(s) Oral daily  nystatin Powder 1 Application(s) Topical two times a day  pantoprazole  Injectable 40 milliGRAM(s) IV Push daily  sodium chloride 0.9% lock flush 3 milliLiter(s) IV Push every 8 hours      Objective:  Vital Signs Last 24 Hrs  T(C): 36.6 (16 Nov 2022 11:46), Max: 36.8 (15 Nov 2022 20:43)  T(F): 97.9 (16 Nov 2022 11:46), Max: 98.3 (15 Nov 2022 20:43)  HR: 77 (16 Nov 2022 11:46) (72 - 79)  BP: 113/66 (16 Nov 2022 11:46) (113/66 - 137/70)  BP(mean): --  RR: 18 (16 Nov 2022 11:46) (17 - 20)  SpO2: 98% (16 Nov 2022 11:46) (95% - 98%)    Parameters below as of 16 Nov 2022 11:46  Patient On (Oxygen Delivery Method): room air        PHYSICAL EXAM:  Constitutional:no acute distress  Eyes:EBER, EOMI  Ear/Nose/Throat: no oral lesions, 	  Respiratory: clear BL  Cardiovascular: S1S2  Gastrointestinal:soft, (+) BS, no tenderness  Extremities:no e/e/c  No Lymphadenopathy  IV sites not inflammed.    LABS:                        9.9    12.27 )-----------( 278      ( 15 Nov 2022 09:31 )             33.6     11-15    139  |  100  |  15  ----------------------------<  195<H>  3.8   |  27  |  0.42<L>    Ca    9.1      15 Nov 2022 09:31  Phos  3.3     11-15  Mg     1.7     11-15    TPro  6.9  /  Alb  3.2<L>  /  TBili  0.2  /  DBili  x   /  AST  15  /  ALT  16  /  AlkPhos  113  11-15          MICROBIOLOGY:            RECENT CULTURES:      RADIOLOGY & ADDITIONAL STUDIES:    < from: Xray Cinesophagram Swallow Function w/ Contrast (11.16.22 @ 11:49) >  IMPRESSION:  Penetration with incomplete retrieval on a thin and thick   liquid trial. No aspiration was seen. A seperate report will be issued by   the department of speech and hearing.    < end of copied text >

## 2022-11-16 NOTE — CONSULT NOTE ADULT - CONSULT REASON
+ sputum culture
DM2
gum pain
right elbow fluid collection aspiration
R/o R elbow Septic arthritis
repeat Aspiration of right elbow collection
tracheostomy
port removal
r/o R elbow septic arthritis
right elbow echar
rule out candida endophthalmitis
acute respiratory failure
pedal wounds

## 2022-11-16 NOTE — PROGRESS NOTE ADULT - NSPROGADDITIONALINFOA_GEN_ALL_CORE
-Plan discussed with pt/team. Spoke to CTU team to f/u pt c/o R mouth/ear pain.   Contact info: [phone] (24/7). pager [phone]  [email or web address] password [email or web address]  Teams  Spent 30 minutes assessing pt/labs/meds and discussing plan of care with primary team  Adjusting insulin  Discharge plan  Follow up care

## 2022-11-16 NOTE — CONSULT NOTE ADULT - SUBJECTIVE AND OBJECTIVE BOX
Patient is a 72y old  Female who presents with a chief complaint of Type A aortic dissection (16 Nov 2022 18:19)      HPI:  71F PMH DM, COPD, Chronic Adrenal Insufficiency on Chronic prednisone, history of colorectal cancer s/p resection (colostomy bag), Hx of CAD, Chronic A fib on Eliquis, and tracheomalacia and multiple intubations, recent dx of OM presented to ED at [facility] this morning with epigastric pain, belching and central chest pain. Found on CTA to have type A aortic dissection and transferred to [facility] for surgical evaluation by Dr. Cabrera. (06 Sep 2022 16:32)      PAST MEDICAL & SURGICAL HISTORY:  Atrial fibrillation  paroxysmal, on eliquis      Diabetes  Type 2      COPD (chronic obstructive pulmonary disease)      Adrenal insufficiency  Medrol daily for over 50 years      Aortic insufficiency  moderate AR on echo 5/3/2018      Pelvic fracture      Asthma      Tracheobronchomalacia  diagnosed 2015, s/p bronchial thermoplasty 2016 (Dr Zapien); recent bronchoscopy 6/5/2018 revealed no evidence of tracheobronchomalacia in trachea or bronchial tubes      Colorectal cancer  4/2018- last treatment , chemo and radiation      Rectal bleeding      Seizure  x 1 1/7/18      DVT (deep venous thrombosis)  15-20 years ago, took coumadin      TIA (transient ischemic attack)  multiple, last 5 years ago - presents as right-sided weakness      History of partial hysterectomy  30 years ago - fibroids      H/O total knee replacement, bilateral  5 years ago      History of sinus surgery  multiple sinus surgeries      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)- course complicated by obstruction, respiratory failure, multiple CPR attempts -  stent discontinued; 10/20/2016 Tracheobronchoplasty (Prolene Mesh) performed at [facility] by Dr Zapien      S/P bronchoscopy  6/5/2018 - [place] (Dr Zapien) no evidence of tracheobronchomalacia in trachea or bronchial tubes      Rectal bleeding  exam under anesthesia ([facility]) 2/2018      MEDICATIONS  (STANDING):  acetylcysteine 10%  Inhalation 4 milliLiter(s) Inhalation every 12 hours  albuterol/ipratropium for Nebulization 3 milliLiter(s) Nebulizer every 6 hours  apixaban 5 milliGRAM(s) Enteral Tube every 12 hours  ascorbic acid 500 milliGRAM(s) Oral daily  buDESOnide    Inhalation Suspension 0.5 milliGRAM(s) Inhalation every 12 hours  chlorhexidine 2% Cloths 1 Application(s) Topical <User Schedule>  collagenase Ointment 1 Application(s) Topical daily  dextrose 50% Injectable 25 Gram(s) IV Push once  dextrose 50% Injectable 12.5 Gram(s) IV Push once  dextrose 50% Injectable 25 Gram(s) IV Push once  dextrose Oral Gel 15 Gram(s) Oral once  diphenhydramine 2%/zinc acetate 0.1% Cream 1 Application(s) Topical every 8 hours  fluconAZOLE   Tablet 400 milliGRAM(s) Oral daily  glucagon  Injectable 1 milliGRAM(s) IntraMuscular once  hydrALAZINE 50 milliGRAM(s) Oral three times a day  insulin lispro (ADMELOG) corrective regimen sliding scale   SubCutaneous three times a day before meals  insulin lispro (ADMELOG) corrective regimen sliding scale   SubCutaneous <User Schedule>  insulin NPH human recombinant 4 Unit(s) SubCutaneous <User Schedule>  magnesium oxide 400 milliGRAM(s) Oral every 8 hours  methylPREDNISolone 8 milliGRAM(s) Oral daily  metoclopramide Injectable 5 milliGRAM(s) IV Push every 8 hours  metoprolol tartrate 12.5 milliGRAM(s) Oral two times a day  mexiletine 200 milliGRAM(s) Oral every 8 hours  multivitamin 1 Tablet(s) Oral daily  nystatin Powder 1 Application(s) Topical two times a day  pantoprazole  Injectable 40 milliGRAM(s) IV Push daily  sodium chloride 0.9% lock flush 3 milliLiter(s) IV Push every 8 hours  trimethoprim   80 mG/sulfamethoxazole 400 mG 1 Tablet(s) Oral daily    MEDICATIONS  (PRN):  acetaminophen    Suspension .. 650 milliGRAM(s) Oral every 6 hours PRN Temp greater or equal to 38C (100.4F), Mild Pain (1 - 3)  calamine/zinc oxide Lotion 1 Application(s) Topical every 8 hours PRN Itching      Allergies    aspirin (Short breath)  Avelox (Short breath; Pruritus)  cefepime (Anaphylaxis)  codeine (Short breath)  Dilaudid (Short breath)  iodine (Short breath; Swelling)  penicillin (Anaphylaxis)  shellfish (Anaphylaxis)  tetanus toxoid (Short breath)  Valium (Short breath)    Intolerances        FAMILY HISTORY:  Family history of asthma    Family history of breast cancer (Sibling)    Family history of diabetes mellitus type II        *SOCIAL HISTORY: (guardian or who pt came with), (smoking hx)    *Last Dental Visit:    Vital Signs Last 24 Hrs  T(C): 36.5 (16 Nov 2022 19:42), Max: 36.7 (16 Nov 2022 00:39)  T(F): 97.7 (16 Nov 2022 19:42), Max: 98.1 (16 Nov 2022 00:39)  HR: 80 (16 Nov 2022 19:42) (72 - 80)  BP: 123/71 (16 Nov 2022 19:42) (113/66 - 137/70)  BP(mean): --  RR: 18 (16 Nov 2022 19:42) (18 - 20)  SpO2: 95% (16 Nov 2022 19:42) (95% - 98%)    Parameters below as of 16 Nov 2022 19:42  Patient On (Oxygen Delivery Method): room air        EOE:    Mandible FROM             Facial bones and MOM grossly intact             ( - ) trismus             ( - ) LAD             ( - ) swelling             ( - ) asymmetry             ( - ) palpation             ( - ) SOB             ( - ) dysphagia             ( - ) LOC    IOE:  Edentulous           tongue/FOM WNL           labial/buccal mucosa WNL           ( - ) percussion           ( + ) palpation, soreness UR buccal alveolus            ( - ) swelling     Dentition present: edentulous   Patient stated that she has not worn her upper dentures for 3 months, put them in yesterday and her mouth hurts. Upon a limited beside examination, sore spot noted on her upper right posterior alveolus.     Radiographs: No radiographs taken, as limited bedside examination.     LABS:                        9.9    12.27 )-----------( 278      ( 15 Nov 2022 09:31 )             33.6     11-15    139  |  100  |  15  ----------------------------<  195<H>  3.8   |  27  |  0.42<L>    Ca    9.1      15 Nov 2022 09:31  Phos  3.3     11-15  Mg     1.7     11-15    TPro  6.9  /  Alb  3.2<L>  /  TBili  0.2  /  DBili  x   /  AST  15  /  ALT  16  /  AlkPhos  113  11-15      Platelet Count - Automated: 278 K/uL [150 - 400] (11-15 @ 09:31)      ASSESSMENT: Traumatic ulcer due to denture prosthesis.     RECOMMENDATIONS:   1) Avoid wearing denture until healed/after healing and daily warm salt water rinses   2) Dental F/U with outpatient dentist for denture adjustments    Scott Valderrama DDS #[id number]

## 2022-11-16 NOTE — SWALLOW VFSS/MBS ASSESSMENT ADULT - NS SWALLOW VFSS REC ASPIR MON
Monitor for s/s aspiration/laryngeal penetration. If noted:  D/C p.o. intake, provide non-oral nutrition/hydration/meds, and contact this service @ [phone]/change of breathing pattern/cough/gurgly voice/fever/pneumonia/throat clearing/upper respiratory infection

## 2022-11-16 NOTE — PROGRESS NOTE ADULT - ASSESSMENT
71 year-old female with a history of DM2, CAD, A-Fib on apixaban, Severe Persistent Asthma (on chronic steroids, recently started on Tezspire), colon cancer s/p resection/chemo, and tracheobronchomalacia s/p tracheoplasty, and recent OM of the R foot s/b debridement and completed course of Vancomycin/Ertapenem for MRSA/ESBL Proteus/Corynebacterium who now presents with chest pain, found to have Type A dissection s/p repair with modified Bentall procedure and hemiarch replacement on 9/6/22. Post-op course complicated by acute hypoxemic respiratory failure, shock, anemia, and hyperglycemia requiring insulin gtt. Extubated 9/13, now awake and alert with mild encephalopathy but overall significantly improved.    Assessment:  Acute hypoxemic respiratory failure requiring intubation  Type A Aortic Dissection  Severe Persistent Asthma  History of Tracheobronchomalacia  fevers and MRSA bacteremia and tracheal aspirate material with MRSA    -leukocytosis resolved   MRSA bacteremia last + culture 9/28  last positive Candida blood culture 9/30  Right elbow area grew ESBL Proteus - treated with a course of meropenem and a wound vac with improvement in swelling and no involvement of the joint space reported        MRSA  bacteremia and candidemia  completed six weeks of IV fluconazole and IV Vancomycin and switched to oral suppression    oral suppression options with fluconazole 400mg and Bactrim DS daily     ESBL proteus in elbow area fluid sensitive to meropenem and aspiration 10/22 grew proteus (reportedly superficial and not involving bursa or joint)  completed meropenem    Please call if questions arise        Jeff Calixto MD  Can be called via Teams  After 5pm/weekends [phone]

## 2022-11-16 NOTE — PROGRESS NOTE ADULT - PROBLEM SELECTOR PLAN 1
s/p repair with modified Bentall procedure and hemiarch replacement on 9/6/22 s/p repair with modified Bentall procedure and hemiarch replacement on 9/6/22  continue postop care  continue eliquis for AC  continue lop 12.5 bid and mexitil 200 q8 for postop afib now rsr  continue hydral 50 q8 for htn  continue bronchodilators and pred 8 mg qd for COPD   npo w/ tf today- MBS today 11/16  dental called today: pt removed own dentures 2 days ago and now c/o rt upper jaw/ mouth pain   rt elbow vac changed today  abx as per ID- pt will need chronic suppression as per ID; bactrim and diflucan; ? d/c IV vanco today d/w ID   pulm toilet  pain management  increase activity as tolerated  bowel regimen  discharge planning- rehab when stable

## 2022-11-16 NOTE — SWALLOW VFSS/MBS ASSESSMENT ADULT - ORAL PREPARATORY PHASE
Functional slowed and inefficient mastication. Portion of bolus expectorated due to inability to masticate properly as per pt.

## 2022-11-16 NOTE — PROGRESS NOTE ADULT - PROBLEM SELECTOR PLAN 5
MRSA bacteremia last + culture 9/28  last positive Candida blood culture 9/30  MRSA  bacteremia and candidemia  Per Simon plan to treat for 6 weeks in the setting of post surgical repair of thoracic aorta dissection through 11/15/22 after which she may need chronic oral suppression with Bactrim plus fluconazole ID following   MRSA bacteremia last + culture 9/28  last positive Candida blood culture 9/30  MRSA  bacteremia and candidemia  Per Simon plan to treat for 6 weeks in the setting of post surgical repair of thoracic aorta dissection through 11/15/22 after which she may need chronic oral suppression with Bactrim plus fluconazole  confirm d/c IV vanco today w/ ID

## 2022-11-16 NOTE — PROGRESS NOTE ADULT - NUTRITIONAL ASSESSMENT
Diet, Pureed:   Mildly Thick Liquids (MILDTHICKLIQS)  Liquid via Teaspoon Only No Straws  Tube Feeding Modality: Nasogastric  Glucerna 1.5 Chago (GLUCERNA1.5RTH)  Total Volume for 24 Hours (mL): 495  Intermittent  Until Goal Tube Feed Rate (mL per Hour): 55  Tube Feeding Hours ON: 9  Tube Feeding OFF (Hours): 15  Tube Feed Start Time: 22:00 (11-16-22 @ 12:22) [Active]    Please see RD assessment and/or follow up.  Managed by primary team as well

## 2022-11-16 NOTE — CONSULT NOTE ADULT - REASON FOR ADMISSION
Type A aortic dissection

## 2022-11-16 NOTE — PROGRESS NOTE ADULT - SUBJECTIVE AND OBJECTIVE BOX
VITAL SIGNS    Telemetry:    Vital Signs Last 24 Hrs  T(C): 36.7 (22 @ 05:18), Max: 36.8 (11-15-22 @ 20:43)  T(F): 98.1 (22 @ 05:18), Max: 98.3 (11-15-22 @ 20:43)  HR: 79 (22 @ 05:18) (72 - 88)  BP: 129/71 (22 @ 05:18) (115/69 - 137/70)  RR: 20 (22 @ 05:18) (17 - 20)  SpO2: 97% (22 @ 05:18) (95% - 97%)            11-15 @ 07:01  -   @ 07:00  --------------------------------------------------------  IN: 690 mL / OUT: 1150 mL / NET: -460 mL       Daily     Daily Weight in k.5 (2022 08:27)  Admit Wt: Drug Dosing Weight  Height (cm): 170.2 (10 Oct 2022 13:39)  Weight (kg): 67 (10 Oct 2022 13:39)  BMI (kg/m2): 23.1 (10 Oct 2022 13:39)  BSA (m2): 1.78 (10 Oct 2022 13:39)      CAPILLARY BLOOD GLUCOSE      POCT Blood Glucose.: 209 mg/dL (2022 06:23)  POCT Blood Glucose.: 116 mg/dL (2022 00:36)  POCT Blood Glucose.: 200 mg/dL (15 Nov 2022 16:42)  POCT Blood Glucose.: 263 mg/dL (15 Nov 2022 12:06)          acetaminophen    Suspension .. 650 milliGRAM(s) Oral every 6 hours PRN  acetylcysteine 10%  Inhalation 4 milliLiter(s) Inhalation every 12 hours  albuterol/ipratropium for Nebulization 3 milliLiter(s) Nebulizer every 6 hours  apixaban 5 milliGRAM(s) Enteral Tube every 12 hours  ascorbic acid 500 milliGRAM(s) Oral daily  buDESOnide    Inhalation Suspension 0.5 milliGRAM(s) Inhalation every 12 hours  calamine/zinc oxide Lotion 1 Application(s) Topical every 8 hours PRN  chlorhexidine 2% Cloths 1 Application(s) Topical <User Schedule>  collagenase Ointment 1 Application(s) Topical daily  diphenhydramine 2%/zinc acetate 0.1% Cream 1 Application(s) Topical every 8 hours  fluconAZOLE   Tablet 400 milliGRAM(s) Oral daily  hydrALAZINE 50 milliGRAM(s) Oral three times a day  insulin lispro (ADMELOG) corrective regimen sliding scale   SubCutaneous every 6 hours  insulin NPH human recombinant 5 Unit(s) SubCutaneous <User Schedule>  insulin NPH human recombinant 40 Unit(s) SubCutaneous <User Schedule>  magnesium oxide 400 milliGRAM(s) Oral every 8 hours  methylPREDNISolone 8 milliGRAM(s) Oral daily  metoclopramide Injectable 5 milliGRAM(s) IV Push every 8 hours  metoprolol tartrate 12.5 milliGRAM(s) Oral two times a day  mexiletine 200 milliGRAM(s) Oral every 8 hours  multivitamin 1 Tablet(s) Oral daily  nystatin Powder 1 Application(s) Topical two times a day  pantoprazole  Injectable 40 milliGRAM(s) IV Push daily  sodium chloride 0.9% lock flush 3 milliLiter(s) IV Push every 8 hours  trimethoprim   80 mG/sulfamethoxazole 400 mG 1 Tablet(s) Oral daily  vancomycin  IVPB 750 milliGRAM(s) IV Intermittent every 12 hours  vancomycin  IVPB          PHYSICAL EXAM    Subjective: "Hi.   Neurology: alert and oriented x 3, nonfocal, no gross deficits  CV : tele:  RSR  Sternal Wound :  CDI with dressing , Stable  Lungs: clear. RR easy, unlabored   Abdomen: soft, nontender, nondistended, positive bowel sounds, bowel movement   Neg N/V/D   :  pt voiding without difficulty   Extremities:   POOLE; edema, neg calf tenderness.   PPP bilaterally      PW:  Chest tubes:                 VITAL SIGNS    Telemetry:  rsr 70-80  Vital Signs Last 24 Hrs  T(C): 36.7 (22 @ 05:18), Max: 36.8 (11-15-22 @ 20:43)  T(F): 98.1 (22 @ 05:18), Max: 98.3 (11-15-22 @ 20:43)  HR: 79 (22 @ 05:18) (72 - 88)  BP: 129/71 (22 @ 05:18) (115/69 - 137/70)  RR: 20 (22 @ 05:18) (17 - 20)  SpO2: 97% (22 @ 05:18) (95% - 97%)            11-15 @ 07:01  -   @ 07:00  --------------------------------------------------------  IN: 690 mL / OUT: 1150 mL / NET: -460 mL       Daily     Daily Weight in k.5 (2022 08:27)  Admit Wt: Drug Dosing Weight  Height (cm): 170.2 (10 Oct 2022 13:39)  Weight (kg): 67 (10 Oct 2022 13:39)  BMI (kg/m2): 23.1 (10 Oct 2022 13:39)  BSA (m2): 1.78 (10 Oct 2022 13:39)      CAPILLARY BLOOD GLUCOSE      POCT Blood Glucose.: 209 mg/dL (2022 06:23)  POCT Blood Glucose.: 116 mg/dL (2022 00:36)  POCT Blood Glucose.: 200 mg/dL (15 Nov 2022 16:42)  POCT Blood Glucose.: 263 mg/dL (15 Nov 2022 12:06)          acetaminophen    Suspension .. 650 milliGRAM(s) Oral every 6 hours PRN  acetylcysteine 10%  Inhalation 4 milliLiter(s) Inhalation every 12 hours  albuterol/ipratropium for Nebulization 3 milliLiter(s) Nebulizer every 6 hours  apixaban 5 milliGRAM(s) Enteral Tube every 12 hours  ascorbic acid 500 milliGRAM(s) Oral daily  buDESOnide    Inhalation Suspension 0.5 milliGRAM(s) Inhalation every 12 hours  calamine/zinc oxide Lotion 1 Application(s) Topical every 8 hours PRN  chlorhexidine 2% Cloths 1 Application(s) Topical <User Schedule>  collagenase Ointment 1 Application(s) Topical daily  diphenhydramine 2%/zinc acetate 0.1% Cream 1 Application(s) Topical every 8 hours  fluconAZOLE   Tablet 400 milliGRAM(s) Oral daily  hydrALAZINE 50 milliGRAM(s) Oral three times a day  insulin lispro (ADMELOG) corrective regimen sliding scale   SubCutaneous every 6 hours  insulin NPH human recombinant 5 Unit(s) SubCutaneous <User Schedule>  insulin NPH human recombinant 40 Unit(s) SubCutaneous <User Schedule>  magnesium oxide 400 milliGRAM(s) Oral every 8 hours  methylPREDNISolone 8 milliGRAM(s) Oral daily  metoclopramide Injectable 5 milliGRAM(s) IV Push every 8 hours  metoprolol tartrate 12.5 milliGRAM(s) Oral two times a day  mexiletine 200 milliGRAM(s) Oral every 8 hours  multivitamin 1 Tablet(s) Oral daily  nystatin Powder 1 Application(s) Topical two times a day  pantoprazole  Injectable 40 milliGRAM(s) IV Push daily  sodium chloride 0.9% lock flush 3 milliLiter(s) IV Push every 8 hours  trimethoprim   80 mG/sulfamethoxazole 400 mG 1 Tablet(s) Oral daily  vancomycin  IVPB 750 milliGRAM(s) IV Intermittent every 12 hours  vancomycin  IVPB          PHYSICAL EXAM    Subjective: "I feel ok."  Neurology: alert and oriented x 3, nonfocal, no gross deficits  CV : tele:  RSR 70-80  Sternal Wound :  CDI BARRETT- healing well   Lungs: + bilateral rhonchi;  RR easy, unlabored; former trach site cdi w/ dressing   Abdomen: soft, nontender, nondistended, + bs; + colostomy- stoma intact; Neg N/V/D; + kaofeed TF-glucerna @ 65 cc/ hr    :  pt voiding without difficulty   Extremities:   POOLE; neg LE edema, neg calf tenderness.   PPP bilaterally; rt foot dressing cdi; rt elbow vac intact     PW: no  Chest tubes: none

## 2022-11-16 NOTE — PROGRESS NOTE ADULT - PROBLEM SELECTOR PLAN 2
Continue ademelog and NPH per endocrine SABINO. Qasim following   continue admelog SS   npo with glucerna TF  continue NPH as per endo   accuchecks q6 hours

## 2022-11-16 NOTE — PROGRESS NOTE ADULT - PROBLEM SELECTOR PLAN 3
A-Fib on apixaban pt currently rsr 70-80  continue lop 12.5 bid  continue mexitil 200 q8  monitor and supplement electrolytes  AC with eliquis 5 bid

## 2022-11-16 NOTE — PROGRESS NOTE ADULT - ASSESSMENT
71 year-old female with a history of DM2, CAD, A-Fib on apixaban, Severe Persistent Asthma (on chronic steroids, recently started on Tezspire), colon cancer s/p resection/chemo, and tracheobronchomalacia s/p tracheoplasty, and recent OM of the R foot s/b debridement and completed course of Vancomycin/Ertapenem for MRSA/ESBL Proteus/Corynebacterium who now presents with chest pain, found to have Type A dissection s/p repair with modified Bentall procedure and hemiarch replacement on 22. Post-op course complicated by acute hypoxemic respiratory failure, shock, anemia, and hyperglycemia requiring insulin gtt. Extubated , now awake and alert with mild encephalopathy but overall significantly improved.    Assessment:  Acute hypoxemic respiratory failure requiring intubation  Type A Aortic Dissection  Severe Persistent Asthma  History of Tracheobronchomalacia    Plan:  See below:  -**************************                                SEE Below:  -improving but weakness  9/15-ABX adjusted to ceftriaxone (LFTS)-PICU  -PICU, low grade temp-fever work up in progress, no resp decline or sx  -resp stable at present but tenuous  -for FEESST today, NGT in place w/TF  -s/p FEESST-passed, remains in PICU          -TF in place at 40cc, more OOB          -hypoglycemia noted and rx, no change in resp status  -vented/sedated-pressors/inotropes/ABX  -remains vented on nitrous/less O2 needs, improving LFTS                   -vented/semi sedated--less O2 needs  10/3-on vanco, weaning sedation/, all lines changed  10/6-no changes-now afebrile-on vanco   10/7-no weaning-remains off pressors  10/10-for trach, no weaning done               10/11-s/p trach-uneventful  10/12-TC and ;cpap done and tolerated well  10/13-weaning TC continues              10/14-mucus issues-TC continues  10/24-TC x mult days-stable-secretion issues remain           10/25-replaced on vent for secretions            10/26-TC in place, mult consults  10/27-no changes-TC continues    10/28-wound care-vac in place  10/31-TC in place, elbow wound vac in place  -resp stable, elbow vac changed--ABX as per ID     -no new issues over the day  11/3-no new events-on TC/OOB as able; ENT f/up for awake laryngoscopy  -ENT f/up, vest use-feels well                                  -no new events--await decannulation   -phonating well, wound vac right arm in place  -awaiting floor bed-board CICU                    11/10-CTU-vanco until -no new events                     -continued ambulation, TC remains  -stable over the day  -decannulated and phonating well, wants rehab  11/15-VSS no acute issues overnight, await swallow evaln   71 year-old female with a history of DM2, CAD, A-Fib on apixaban, Severe Persistent Asthma (on chronic steroids, recently started on Tezspire), colon cancer s/p resection/chemo, and tracheobronchomalacia s/p tracheoplasty, and recent OM of the R foot s/b debridement and completed course of Vancomycin/Ertapenem for MRSA/ESBL Proteus/Corynebacterium who now presents with chest pain, found to have Type A dissection s/p repair with modified Bentall procedure and hemiarch replacement on 22. Post-op course complicated by acute hypoxemic respiratory failure, shock, anemia, and hyperglycemia requiring insulin gtt. Extubated , now awake and alert with mild encephalopathy but overall significantly improved.    Assessment:  Acute hypoxemic respiratory failure requiring intubation  Type A Aortic Dissection  Severe Persistent Asthma  History of Tracheobronchomalacia    Plan:  See below:  -**************************                                SEE Below:  -improving but weakness  9/15-ABX adjusted to ceftriaxone (LFTS)-PICU  -PICU, low grade temp-fever work up in progress, no resp decline or sx  -resp stable at present but tenuous  -for FEESST today, NGT in place w/TF  -s/p FEESST-passed, remains in PICU          -TF in place at 40cc, more OOB          -hypoglycemia noted and rx, no change in resp status  -vented/sedated-pressors/inotropes/ABX  -remains vented on nitrous/less O2 needs, improving LFTS                   -vented/semi sedated--less O2 needs  10/3-on vanco, weaning sedation/, all lines changed  10/6-no changes-now afebrile-on vanco   10/7-no weaning-remains off pressors  10/10-for trach, no weaning done               10/11-s/p trach-uneventful  10/12-TC and ;cpap done and tolerated well  10/13-weaning TC continues              10/14-mucus issues-TC continues  10/24-TC x mult days-stable-secretion issues remain           10/25-replaced on vent for secretions            10/26-TC in place, mult consults  10/27-no changes-TC continues    10/28-wound care-vac in place  10/31-TC in place, elbow wound vac in place  -resp stable, elbow vac changed--ABX as per ID     -no new issues over the day  11/3-no new events-on TC/OOB as able; ENT f/up for awake laryngoscopy  -ENT f/up, vest use-feels well                                  -no new events--await decannulation   -phonating well, wound vac right arm in place  -awaiting floor bed-board CICU                    11/10-CTU-vanco until -no new events                     -continued ambulation, TC remains  -stable over the day  -decannulated and phonating well, wants rehab  11/15-VSS no acute issues overnight, await swallow eval   VSS; RSR 70-80; npo w/ koafeed tf's; mbs today; insulin adjusted as per H. endo this am; dental consult- pt removed own dentures 2 days ago and now c/o rt upper jaw/ mouth pain; rt elbow vac changed today; abx as per ID- pt will need chronic suppression as per ID; bactrim and diflucan; ? d/c IV vanco today d/w ID   discharge planing- rehab when stable    71 year-old female with a history of DM2, CAD, A-Fib on apixaban, Severe Persistent Asthma (on chronic steroids, recently started on Tezspire), colon cancer s/p resection/chemo, and tracheobronchomalacia s/p tracheoplasty, and recent OM of the R foot s/b debridement and completed course of Vancomycin/Ertapenem for MRSA/ESBL Proteus/Corynebacterium who now presents with chest pain, found to have Type A dissection s/p repair with modified Bentall procedure and hemiarch replacement on 22. Post-op course complicated by acute hypoxemic respiratory failure, shock, anemia, and hyperglycemia requiring insulin gtt. Extubated , now awake and alert with mild encephalopathy but overall significantly improved.    Assessment:  Acute hypoxemic respiratory failure requiring intubation  Type A Aortic Dissection  Severe Persistent Asthma  History of Tracheobronchomalacia    Plan:  See below:  -**************************                                SEE Below:  -improving but weakness  9/15-ABX adjusted to ceftriaxone (LFTS)-PICU  -PICU, low grade temp-fever work up in progress, no resp decline or sx  -resp stable at present but tenuous  -for FEESST today, NGT in place w/TF  -s/p FEESST-passed, remains in PICU          -TF in place at 40cc, more OOB          -hypoglycemia noted and rx, no change in resp status  -vented/sedated-pressors/inotropes/ABX  -remains vented on nitrous/less O2 needs, improving LFTS                   -vented/semi sedated--less O2 needs  10/3-on vanco, weaning sedation/, all lines changed  10/6-no changes-now afebrile-on vanco   10/7-no weaning-remains off pressors  10/10-for trach, no weaning done               10/11-s/p trach-uneventful  10/12-TC and ;cpap done and tolerated well  10/13-weaning TC continues              10/14-mucus issues-TC continues  10/24-TC x mult days-stable-secretion issues remain           10/25-replaced on vent for secretions            10/26-TC in place, mult consults  10/27-no changes-TC continues    10/28-wound care-vac in place  10/31-TC in place, elbow wound vac in place  -resp stable, elbow vac changed--ABX as per ID     -no new issues over the day  11/3-no new events-on TC/OOB as able; ENT f/up for awake laryngoscopy  -ENT f/up, vest use-feels well                                  -no new events--await decannulation   -phonating well, wound vac right arm in place  -awaiting floor bed-board CICU                    11/10-CTU-vanco until -no new events                     -continued ambulation, TC remains  -stable over the day  -decannulated and phonating well, wants rehab  11/15-VSS no acute issues overnight, await swallow eval   VSS; RSR 70-80; npo w/ koafeed tf's; mbs today; diet advanced to puree diet w/ mild thick liquids; noctural tf; insulin adjusted as per H. endo as per diet changes; dental consult- pt removed own dentures 2 days ago and now c/o rt upper jaw/ mouth pain; rt elbow vac changed today; abx as per ID- pt will need chronic suppression as per ID; bactrim and diflucan; ? d/c IV vanco today d/w ID   discharge planing- rehab when stable

## 2022-11-16 NOTE — PROGRESS NOTE ADULT - ASSESSMENT
71 year-old female with a history of DM2, CAD, A-Fib on apixaban, Severe Persistent Asthma (on chronic steroids, recently started on Tezspire), colon cancer s/p resection/chemo, and tracheobronchomalacia s/p tracheoplasty, and recent OM of the R foot s/b debridement and completed course of Vancomycin/Ertapenem for MRSA/ESBL Proteus/Corynebacterium who now presents with chest pain, found to have Type A dissection s/p repair with modified Bentall procedure and hemiarch replacement on 22. Post-op course complicated by acute hypoxemic respiratory failure, shock, anemia, and hyperglycemia requiring insulin gtt. Extubated , now awake and alert with mild encephalopathy but overall significantly improved.    Assessment:  Acute hypoxemic respiratory failure requiring intubation  Type A Aortic Dissection  Severe Persistent Asthma  History of Tracheobronchomalacia    Plan:  See below:  -**************************                                SEE Below:  -improving but weakness  9/15-ABX adjusted to ceftriaxone (LFTS)-PICU  -PICU, low grade temp-fever work up in progress, no resp decline or sx  -resp stable at present but tenuous  -for FEESST today, NGT in place w/TF  -s/p FEESST-passed, remains in PICU          -TF in place at 40cc, more OOB          -hypoglycemia noted and rx, no change in resp status  -vented/sedated-pressors/inotropes/ABX  -remains vented on nitrous/less O2 needs, improving LFTS                   -vented/semi sedated--less O2 needs  10/3-on vanco, weaning sedation/, all lines changed  10/6-no changes-now afebrile-on vanco   10/7-no weaning-remains off pressors  10/10-for trach, no weaning done               10/11-s/p trach-uneventful  10/12-TC and ;cpap done and tolerated well  10/13-weaning TC continues              10/14-mucus issues-TC continues  10/24-TC x mult days-stable-secretion issues remain           10/25-replaced on vent for secretions            10/26-TC in place, mult consults  10/27-no changes-TC continues    10/28-wound care-vac in place  10/31-TC in place, elbow wound vac in place  -resp stable, elbow vac changed--ABX as per ID     -no new issues over the day  11/3-no new events-on TC/OOB as able; ENT f/up for awake laryngoscopy  -ENT f/up, vest use-feels well                                  -no new events--await decannulation   -phonating well, wound vac right arm in place  -awaiting floor bed-board CICU                    11/10-CTU-vanco until -no new events                     -continued ambulation, TC remains  -stable over the day  -decannulated and phonating well, wants rehab  11/15-tx floor, await swallow evaln  -swallow evaln in progress

## 2022-11-16 NOTE — PROGRESS NOTE ADULT - PROBLEM SELECTOR PLAN 1
-test BG ac meals/hs and 3am daily   -Please treat hypoglycemia as per protocol. Pt hypoglycemic but noted hypoglycemic protocol not followed. Spoke to team NP and RN  -If BG remains <100 might need D5 infusion at 30cc/hr until TFs are restarted tonight  -Start NPH 4 units at start of TF (10pm)   -Since unable to determine how much pt is eating will start Admelog low correction scale ac/hs and 3am for now.  -Will adjust insulin as needed  Discharge plan:  - Likely to discharge patient on basal/bolus insulin. Final regimen pending clinical course.  - Recommend routine outpatient ophthalmology, podiatry  - Can f/u with endocrinologist Dr. Saavedra.  - Make sure pt has Rx for all DM supplies and insulin/ DM meds.  -Based on pt's clinical condition and mental status at this time she is not able to independently manage her DM. Will evaluate pt's ability to manage DM at time of discharge. If unable> pt will need family/ care giver (s) to manage DM care at home  -Will need rehab.

## 2022-11-16 NOTE — PROGRESS NOTE ADULT - SUBJECTIVE AND OBJECTIVE BOX
CHIEF COMPLAINT: f/up sob, chronic resp failure, TBM, severe persistent asthma, VC dysfunction, Type A aortic dissection s/p repair w/modified "Bentall procedure and hemiarch replacement 9/6-finally decannulated-on RA, working on swallowing    Interval Events: no OOB/vest    REVIEW OF SYSTEMS:  Constitutional: No fevers or chills. No weight loss. No fatigue or generalized malaise.  Eyes: No itching or discharge from the eyes  ENT: No ear pain. No ear discharge. No nasal congestion. No post nasal drip. No epistaxis. No throat pain. No sore throat. No difficulty swallowing.   CV: No chest pain. No palpitations. No lightheadedness or dizziness.   Resp: No dyspnea at rest. No dyspnea on exertion. No orthopnea. No wheezing. No cough. No stridor. No sputum production. No chest pain with respiration.  GI: No nausea. No vomiting. No diarrhea.  MSK: No joint pain or pain in any extremities  Integumentary: No skin lesions. No pedal edema.  Neurological: + gross motor weakness. No sensory changes.  [+ ] All other systems negative  [ ] Unable to assess ROS because ________    OBJECTIVE:  ICU Vital Signs Last 24 Hrs  T(C): 36.7 (16 Nov 2022 00:39), Max: 36.8 (15 Nov 2022 20:43)  T(F): 98.1 (16 Nov 2022 00:39), Max: 98.3 (15 Nov 2022 20:43)  HR: 72 (16 Nov 2022 00:39) (72 - 88)  BP: 137/70 (16 Nov 2022 00:39) (115/69 - 137/70)  BP(mean): --  ABP: --  ABP(mean): --  RR: 20 (16 Nov 2022 00:39) (17 - 20)  SpO2: 95% (16 Nov 2022 00:39) (95% - 98%)    O2 Parameters below as of 16 Nov 2022 00:39  Patient On (Oxygen Delivery Method): room air              11-14 @ 07:01  -  11-15 @ 07:00  --------------------------------------------------------  IN: 905 mL / OUT: 400 mL / NET: 505 mL    11-15 @ 07:01 - 11-16 @ 04:49  --------------------------------------------------------  IN: 690 mL / OUT: 750 mL / NET: -60 mL      CAPILLARY BLOOD GLUCOSE  198 (14 Nov 2022 06:00)      POCT Blood Glucose.: 116 mg/dL (16 Nov 2022 00:36)      PHYSICAL EXAM: NAD in bed on RA  General: Awake, alert, oriented X 3.   HEENT: Atraumatic, normocephalic.                 Mallampatti Grade 3                No nasal congestion.                No tonsillar or pharyngeal exudates.  Lymph Nodes: No palpable lymphadenopathy  Neck: No JVD. No carotid bruit.   Respiratory: abnormal chest expansion                         Normal percussion                         Normal and equal air entry                         No wheeze, rhonchi or rales.  Cardiovascular: S1 S2 normal. No murmurs, rubs or gallops.   Abdomen: Soft, non-tender, non-distended. No organomegaly. Normoactive bowel sounds.  Extremities: Warm to touch. Peripheral pulse palpable. No pedal edema.   Skin: No rashes or skin lesions  Neurological: Motor and sensory examination equal and normal in all four extremities.  Psychiatry: Appropriate mood and affect.    HOSPITAL MEDICATIONS:  MEDICATIONS  (STANDING):  acetylcysteine 10%  Inhalation 4 milliLiter(s) Inhalation every 12 hours  albuterol/ipratropium for Nebulization 3 milliLiter(s) Nebulizer every 6 hours  apixaban 5 milliGRAM(s) Enteral Tube every 12 hours  ascorbic acid 500 milliGRAM(s) Oral daily  buDESOnide    Inhalation Suspension 0.5 milliGRAM(s) Inhalation every 12 hours  chlorhexidine 2% Cloths 1 Application(s) Topical <User Schedule>  collagenase Ointment 1 Application(s) Topical daily  diphenhydramine 2%/zinc acetate 0.1% Cream 1 Application(s) Topical every 8 hours  fluconAZOLE   Tablet 400 milliGRAM(s) Oral daily  hydrALAZINE 50 milliGRAM(s) Oral three times a day  insulin lispro (ADMELOG) corrective regimen sliding scale   SubCutaneous every 6 hours  insulin NPH human recombinant 5 Unit(s) SubCutaneous <User Schedule>  insulin NPH human recombinant 40 Unit(s) SubCutaneous <User Schedule>  magnesium oxide 400 milliGRAM(s) Oral every 8 hours  methylPREDNISolone 8 milliGRAM(s) Oral daily  metoclopramide Injectable 5 milliGRAM(s) IV Push every 8 hours  metoprolol tartrate 12.5 milliGRAM(s) Oral two times a day  mexiletine 200 milliGRAM(s) Oral every 8 hours  multivitamin 1 Tablet(s) Oral daily  nystatin Powder 1 Application(s) Topical two times a day  pantoprazole  Injectable 40 milliGRAM(s) IV Push daily  sodium chloride 0.9% lock flush 3 milliLiter(s) IV Push every 8 hours  trimethoprim   80 mG/sulfamethoxazole 400 mG 1 Tablet(s) Oral daily  vancomycin  IVPB 750 milliGRAM(s) IV Intermittent every 12 hours  vancomycin  IVPB        MEDICATIONS  (PRN):  acetaminophen    Suspension .. 650 milliGRAM(s) Oral every 6 hours PRN Temp greater or equal to 38C (100.4F), Mild Pain (1 - 3)  calamine/zinc oxide Lotion 1 Application(s) Topical every 8 hours PRN Itching      LABS:                        9.9    12.27 )-----------( 278      ( 15 Nov 2022 09:31 )             33.6     11-15    139  |  100  |  15  ----------------------------<  195<H>  3.8   |  27  |  0.42<L>    Ca    9.1      15 Nov 2022 09:31  Phos  3.3     11-15  Mg     1.7     11-15    TPro  6.9  /  Alb  3.2<L>  /  TBili  0.2  /  DBili  x   /  AST  15  /  ALT  16  /  AlkPhos  113  11-15              MICROBIOLOGY:     RADIOLOGY:  [ ] Reviewed and interpreted by me    Point of Care Ultrasound Findings:    PFT:    EKG:

## 2022-11-16 NOTE — SWALLOW VFSS/MBS ASSESSMENT ADULT - H & P REVIEW
71F PMH DM, COPD, Chronic Adrenal Insufficiency on Chronic prednisone, history of colorectal cancer s/p resection (colostomy bag), Hx of CAD, Chronic A fib on Eliquis, and tracheomalacia s/p tracheoplasty, and multiple intubations, recent dx of OM presented to ED at [facility] this morning with epigastric pain, belching and central chest pain. Found on CTA to have type A aortic dissection and transferred to [facility] for surgical evaluation by Dr. Cabrera. Pt admitted for ascending aortic dissection. Patient went to OR for emergency type A aortic dissection repair with Dr. Cabrera. Extubated post-op on 9/7. On 9/8 pt developed increasingly labored breathing and due to mild delirium and depressed mental status post operatively, patient was reintubated. Extubated 9/13. Passed FEES 9/20 and started on a regular diet/thin liquids, NGT remained in place w/ TF. Intubated 9/28 s/p emesis episode on nocturnal NGT feeds, s/p 10/11 trachestomy. 10/13 weaning to trach collar trials, tolerating TC overnight since 10/25. Decannulated 11/12.

## 2022-11-16 NOTE — PROGRESS NOTE ADULT - SUBJECTIVE AND OBJECTIVE BOX
DIABETES FOLLOW UP NOTE: Saw pt earlier today    Chief Complaint: Endocrine consult requested for management of T2DM    INTERVAL HX: Saw on step down cardiac unit. Per staff tolerating 24 hour TFs of Glucerna at 55cc/hr. BG levels still variable between 100s to 200s in the last 24 hours. Pt awaiting MBS eval today. Pt states feeling better. Remains on chronic Prednisone 8mg for AI. On antibiotics for sepsis. Still c/o reports pain in R side of mouth radiating to R ear.  Per primary team pt passed MBS this pm >TFs were discontinued with rebound hypoglycemia in the 50s this afternoon.        Review of Systems:  General: As above  Cardiovascular: No chest pain, palpitations  Respiratory: No SOB, no cough  GI: No nausea, vomiting, abdominal pain  Endocrine: No polyuria, polydipsia or S&Sx of hypoglycemia    Allergies    aspirin (Short breath)  Avelox (Short breath; Pruritus)  cefepime (Anaphylaxis)  codeine (Short breath)  Dilaudid (Short breath)  iodine (Short breath; Swelling)  penicillin (Anaphylaxis)  shellfish (Anaphylaxis)  tetanus toxoid (Short breath)  Valium (Short breath)    Intolerances      MEDICATIONS:  fluconAZOLE   Tablet 400 milliGRAM(s) Oral daily  insulin lispro (ADMELOG) corrective regimen sliding scale   SubCutaneous every 6 hours  insulin NPH human recombinant 5 Unit(s) SubCutaneous <User Schedule>  insulin NPH human recombinant 40 Unit(s) SubCutaneous <User Schedule>  methylPREDNISolone 8 milliGRAM(s) Oral daily  trimethoprim   80 mG/sulfamethoxazole 400 mG 1 Tablet(s) Oral daily      PHYSICAL EXAM:  VITALS: T(C): 36.6 (11-16-22 @ 11:46)  T(F): 97.9 (11-16-22 @ 11:46), Max: 98.3 (11-15-22 @ 20:43)  HR: 77 (11-16-22 @ 11:46) (72 - 88)  BP: 113/66 (11-16-22 @ 11:46) (113/66 - 137/70)  RR:  (17 - 20)  SpO2:  (95% - 98%)  Wt(kg): --  GENERAL: Female laying in bed in NAD.   HEENT: Small dressing noted in former [facility] site D&I, NGT with TFs on at time of visit.   Chest: Sternal incision healed   Abdomen: Soft, nontender, non distended  Extremities: Warm, no edema in all 4 exts. RUE wound to vac with whitish foamy out put.   NEURO: Alert, able to answer simple questions        LABS:  POCT Blood Glucose.: 56 mg/dL (11-16-22 @ 15:40)  POCT Blood Glucose.: 98 mg/dL (11-16-22 @ 12:57)  POCT Blood Glucose.: 209 mg/dL (11-16-22 @ 06:23)  POCT Blood Glucose.: 116 mg/dL (11-16-22 @ 00:36)  POCT Blood Glucose.: 200 mg/dL (11-15-22 @ 16:42)  POCT Blood Glucose.: 263 mg/dL (11-15-22 @ 12:06)  POCT Blood Glucose.: 220 mg/dL (11-15-22 @ 06:10)  POCT Blood Glucose.: 168 mg/dL (11-14-22 @ 23:52)  POCT Blood Glucose.: 136 mg/dL (11-14-22 @ 17:56)  POCT Blood Glucose.: 305 mg/dL (11-14-22 @ 13:53)  POCT Blood Glucose.: 198 mg/dL (11-14-22 @ 05:34)  POCT Blood Glucose.: 151 mg/dL (11-14-22 @ 00:23)  POCT Blood Glucose.: 168 mg/dL (11-13-22 @ 17:26)                            9.9    12.27 )-----------( 278      ( 15 Nov 2022 09:31 )             33.6       11-15    139  |  100  |  15  ----------------------------<  195<H>  3.8   |  27  |  0.42<L>    eGFR: 103    Ca    9.1      11-15  Mg     1.7     11-15  Phos  3.3     11-15    TPro  6.9  /  Alb  3.2<L>  /  TBili  0.2  /  DBili  x   /  AST  15  /  ALT  16  /  AlkPhos  113  11-15    A1C with Estimated Average Glucose Result: 9.4 % (09-06-22 @ 16:46)      Estimated Average Glucose: 223 mg/dL (09-06-22 @ 16:46)

## 2022-11-16 NOTE — SWALLOW VFSS/MBS ASSESSMENT ADULT - COMMENTS
11/13 CXR IMPRESSION: New, trace left pleural effusion.    Seen by this service 9/14 for a bedside swallow evaluation with recommendations for soft-bite sized solids/mildly thick liquids. Plan for MBS pending transfer to floor. However, pt transferred to PICU and developed new baseline cough, leukocytosis, intermittent delirium and agitation, and ongoing workup for infection. Seen for follow up 9/16 with recommendations for NPO, with non-oral nutrition/hydration/medications pending workup and improvement in mentation. Tentative plan for FEES 9/20. FEES completed 9/20 with recommendations for regular solids/thin liquids.   Actively being followed by this service since 11/2 for speaking-valve evaluation. Pt cleared for PMV use.  Seen for bedside swallow evaluation 11/15 with recommendations for NPO, with non-oral nutrition/hydration/medications pending MBS.

## 2022-11-16 NOTE — SWALLOW VFSS/MBS ASSESSMENT ADULT - ADDITIONAL INFORMATION
+ laryngeal calcifications of the arytenoids  + laryngeal calcifications of the anterior trachea  + anterior nonobstructive cervical osteophytes C5-C6

## 2022-11-16 NOTE — PROGRESS NOTE ADULT - TIME BILLING
as above: swallow evaln in progress-resp stable-speaking well, ambulating daily/stronger over all  -ID f/up-resp stable--wound care f/up-TC continues- (she will always have secretions) due to TBM-s/p  trach 10/10-s/p  resp failure-s/p kjqiai-ECBI-ps ABX-stable CV status-re- VEST rx in use  multifactorial dyspnea-resp failure-severe persistent asthma, TBM s/p tracheoplasty, s/p Aortic aneurysm repair, Bronchitis (proteus)-O2-keep 90% (RA/NC)  severe persistent asthma--medrol 8mg, singulair 10, duoneb q 6, budes .5 bid, tezspire 8/29-was due 9/29-next upon DC  TBM-s/p tracheoplasty--accapella, vest rx, mucomyst here 2 cc 10% q 8 (secretions)  s/p Aortic aneurysm repair--as per CTS staff care  AF-on eliquis rx               CV-improved hydralazine/lopressor/mexilitine   DVT R-IJ-on heparin rx  *****ID-s/p proteus bronchitis-s/p meropenum changed to ceftriaxone and back to meropenem/vanco- off of ABX as of 9/19--restart of ABX 9/27-meropenem/vanco-s/p  meropenem but continues vanco for MRSE sepsis (11/26 end date for vanco), plastics/ortho for elbow-proteus (?wash out)-vac in place-suppressive rx-bact/fluconazole  PT-OOB as able                             GI-TF as able--f/up LFTs-normal-swallow evaln in progress       ****ORTHO f/up--? additional wash out of elbow wound?; wound care f/up  **VC dysfunction--aspiration precautions-s/p trach 10/10-ENT f/up s/p laryngoscopy- decannulated  swallow issues-may need new dentures  Heme onc f/up colon ca  prog--fair                PT-to continue-more OOB     DC planning (flu shot/shingles etc.)    Eulalio Allison MD-Pulmonary   [phone]

## 2022-11-17 LAB
ANION GAP SERPL CALC-SCNC: 11 MMOL/L — SIGNIFICANT CHANGE UP (ref 5–17)
BUN SERPL-MCNC: 14 MG/DL — SIGNIFICANT CHANGE UP (ref 7–23)
CALCIUM SERPL-MCNC: 9.2 MG/DL — SIGNIFICANT CHANGE UP (ref 8.4–10.5)
CHLORIDE SERPL-SCNC: 99 MMOL/L — SIGNIFICANT CHANGE UP (ref 96–108)
CO2 SERPL-SCNC: 25 MMOL/L — SIGNIFICANT CHANGE UP (ref 22–31)
CREAT SERPL-MCNC: 0.47 MG/DL — LOW (ref 0.5–1.3)
EGFR: 100 ML/MIN/1.73M2 — SIGNIFICANT CHANGE UP
GLUCOSE BLDC GLUCOMTR-MCNC: 106 MG/DL — HIGH (ref 70–99)
GLUCOSE BLDC GLUCOMTR-MCNC: 134 MG/DL — HIGH (ref 70–99)
GLUCOSE BLDC GLUCOMTR-MCNC: 178 MG/DL — HIGH (ref 70–99)
GLUCOSE BLDC GLUCOMTR-MCNC: 179 MG/DL — HIGH (ref 70–99)
GLUCOSE BLDC GLUCOMTR-MCNC: 182 MG/DL — HIGH (ref 70–99)
GLUCOSE BLDC GLUCOMTR-MCNC: 250 MG/DL — HIGH (ref 70–99)
GLUCOSE SERPL-MCNC: 196 MG/DL — HIGH (ref 70–99)
HCT VFR BLD CALC: 33.4 % — LOW (ref 34.5–45)
HGB BLD-MCNC: 10 G/DL — LOW (ref 11.5–15.5)
MCHC RBC-ENTMCNC: 22.7 PG — LOW (ref 27–34)
MCHC RBC-ENTMCNC: 29.9 GM/DL — LOW (ref 32–36)
MCV RBC AUTO: 75.9 FL — LOW (ref 80–100)
NRBC # BLD: 0 /100 WBCS — SIGNIFICANT CHANGE UP (ref 0–0)
PLATELET # BLD AUTO: 280 K/UL — SIGNIFICANT CHANGE UP (ref 150–400)
POTASSIUM SERPL-MCNC: 4.4 MMOL/L — SIGNIFICANT CHANGE UP (ref 3.5–5.3)
POTASSIUM SERPL-SCNC: 4.4 MMOL/L — SIGNIFICANT CHANGE UP (ref 3.5–5.3)
RBC # BLD: 4.4 M/UL — SIGNIFICANT CHANGE UP (ref 3.8–5.2)
RBC # FLD: 20.2 % — HIGH (ref 10.3–14.5)
SODIUM SERPL-SCNC: 135 MMOL/L — SIGNIFICANT CHANGE UP (ref 135–145)
WBC # BLD: 15.27 K/UL — HIGH (ref 3.8–10.5)
WBC # FLD AUTO: 15.27 K/UL — HIGH (ref 3.8–10.5)

## 2022-11-17 PROCEDURE — 71045 X-RAY EXAM CHEST 1 VIEW: CPT | Mod: 26

## 2022-11-17 PROCEDURE — 99232 SBSQ HOSP IP/OBS MODERATE 35: CPT

## 2022-11-17 RX ORDER — INFLUENZA VIRUS VACCINE 15; 15; 15; 15 UG/.5ML; UG/.5ML; UG/.5ML; UG/.5ML
0.5 SUSPENSION INTRAMUSCULAR ONCE
Refills: 0 | Status: DISCONTINUED | OUTPATIENT
Start: 2022-11-17 | End: 2022-11-22

## 2022-11-17 RX ORDER — HUMAN INSULIN 100 [IU]/ML
3 INJECTION, SUSPENSION SUBCUTANEOUS
Refills: 0 | Status: DISCONTINUED | OUTPATIENT
Start: 2022-11-17 | End: 2022-11-19

## 2022-11-17 RX ADMIN — MAGNESIUM OXIDE 400 MG ORAL TABLET 400 MILLIGRAM(S): 241.3 TABLET ORAL at 06:10

## 2022-11-17 RX ADMIN — Medication 3 MILLILITER(S): at 06:06

## 2022-11-17 RX ADMIN — Medication 1 TABLET(S): at 13:35

## 2022-11-17 RX ADMIN — MEXILETINE HYDROCHLORIDE 200 MILLIGRAM(S): 150 CAPSULE ORAL at 13:34

## 2022-11-17 RX ADMIN — Medication 4 MILLILITER(S): at 07:23

## 2022-11-17 RX ADMIN — Medication 0.5 MILLIGRAM(S): at 17:12

## 2022-11-17 RX ADMIN — Medication 1: at 11:46

## 2022-11-17 RX ADMIN — MEXILETINE HYDROCHLORIDE 200 MILLIGRAM(S): 150 CAPSULE ORAL at 06:07

## 2022-11-17 RX ADMIN — Medication 50 MILLIGRAM(S): at 06:10

## 2022-11-17 RX ADMIN — Medication 5 MILLIGRAM(S): at 21:56

## 2022-11-17 RX ADMIN — Medication 5 MILLIGRAM(S): at 13:35

## 2022-11-17 RX ADMIN — Medication 0.5 MILLIGRAM(S): at 06:07

## 2022-11-17 RX ADMIN — Medication 1 TABLET(S): at 11:49

## 2022-11-17 RX ADMIN — MAGNESIUM OXIDE 400 MG ORAL TABLET 400 MILLIGRAM(S): 241.3 TABLET ORAL at 13:35

## 2022-11-17 RX ADMIN — NYSTATIN CREAM 1 APPLICATION(S): 100000 CREAM TOPICAL at 06:09

## 2022-11-17 RX ADMIN — Medication 3 MILLILITER(S): at 11:48

## 2022-11-17 RX ADMIN — SODIUM CHLORIDE 3 MILLILITER(S): 9 INJECTION INTRAMUSCULAR; INTRAVENOUS; SUBCUTANEOUS at 07:23

## 2022-11-17 RX ADMIN — CHLORHEXIDINE GLUCONATE 1 APPLICATION(S): 213 SOLUTION TOPICAL at 11:07

## 2022-11-17 RX ADMIN — Medication 8 MILLIGRAM(S): at 06:10

## 2022-11-17 RX ADMIN — Medication 3 MILLILITER(S): at 17:12

## 2022-11-17 RX ADMIN — Medication 2: at 16:57

## 2022-11-17 RX ADMIN — Medication 50 MILLIGRAM(S): at 13:35

## 2022-11-17 RX ADMIN — MAGNESIUM OXIDE 400 MG ORAL TABLET 400 MILLIGRAM(S): 241.3 TABLET ORAL at 21:56

## 2022-11-17 RX ADMIN — Medication 5 MILLIGRAM(S): at 06:07

## 2022-11-17 RX ADMIN — PANTOPRAZOLE SODIUM 40 MILLIGRAM(S): 20 TABLET, DELAYED RELEASE ORAL at 13:36

## 2022-11-17 RX ADMIN — MEXILETINE HYDROCHLORIDE 200 MILLIGRAM(S): 150 CAPSULE ORAL at 21:55

## 2022-11-17 RX ADMIN — APIXABAN 5 MILLIGRAM(S): 2.5 TABLET, FILM COATED ORAL at 06:10

## 2022-11-17 RX ADMIN — Medication 4 MILLILITER(S): at 17:13

## 2022-11-17 RX ADMIN — NYSTATIN CREAM 1 APPLICATION(S): 100000 CREAM TOPICAL at 17:13

## 2022-11-17 RX ADMIN — Medication 12.5 MILLIGRAM(S): at 06:11

## 2022-11-17 RX ADMIN — Medication 50 MILLIGRAM(S): at 21:55

## 2022-11-17 RX ADMIN — Medication 500 MILLIGRAM(S): at 13:37

## 2022-11-17 RX ADMIN — FLUCONAZOLE 400 MILLIGRAM(S): 150 TABLET ORAL at 11:48

## 2022-11-17 RX ADMIN — Medication 1 APPLICATION(S): at 12:38

## 2022-11-17 RX ADMIN — SODIUM CHLORIDE 3 MILLILITER(S): 9 INJECTION INTRAMUSCULAR; INTRAVENOUS; SUBCUTANEOUS at 15:25

## 2022-11-17 RX ADMIN — APIXABAN 5 MILLIGRAM(S): 2.5 TABLET, FILM COATED ORAL at 17:13

## 2022-11-17 RX ADMIN — Medication 12.5 MILLIGRAM(S): at 17:13

## 2022-11-17 NOTE — PROGRESS NOTE ADULT - PROBLEM SELECTOR PLAN 5
ID following   MRSA bacteremia last + culture 9/28  last positive Candida blood culture 9/30  MRSA  bacteremia and candidemia  Per Simon plan to treat for 6 weeks in the setting of post surgical repair of thoracic aorta dissection through 11/15/22 after which she may need chronic oral suppression with Bactrim plus fluconazole  confirm d/c IV vanco today w/ ID

## 2022-11-17 NOTE — PROGRESS NOTE ADULT - PROBLEM SELECTOR PLAN 3
pt currently rsr 70-80  continue lop 12.5 bid  continue mexitil 200 q8  monitor and supplement electrolytes  AC with eliquis 5 bid

## 2022-11-17 NOTE — PROGRESS NOTE ADULT - SUBJECTIVE AND OBJECTIVE BOX
CHIEF COMPLAINT: f/up sob, chronic resp failure, TBM, severe persistent asthma, VC dysfunction, Type A aortic dissection s/p repair w/modified "Bentall procedure and hemiarch replacement 9/6- decannulated-on RA, right shoulder pain upon deep breath/moving, oral ulcer-cant use dentures    Interval Events: sp and sw    REVIEW OF SYSTEMS:  Constitutional: No fevers or chills. No weight loss. No fatigue or generalized malaise.  Eyes: No itching or discharge from the eyes  ENT: No ear pain. No ear discharge. No nasal congestion. No post nasal drip. No epistaxis. No throat pain. No sore throat. No difficulty swallowing.   CV: No chest pain. No palpitations. No lightheadedness or dizziness.   Resp: No dyspnea at rest. No dyspnea on exertion. No orthopnea. No wheezing. No cough. No stridor. No sputum production.  + chest pain with respiration.  GI: No nausea. No vomiting. No diarrhea.  MSK: No joint pain or pain in any extremities  Integumentary: No skin lesions. No pedal edema.  Neurological: No gross motor weakness. No sensory changes.  [+ ] All other systems negative  [ ] Unable to assess ROS because ________    OBJECTIVE:  ICU Vital Signs Last 24 Hrs  T(C): 36.5 (16 Nov 2022 19:42), Max: 36.7 (16 Nov 2022 05:18)  T(F): 97.7 (16 Nov 2022 19:42), Max: 98.1 (16 Nov 2022 05:18)  HR: 80 (16 Nov 2022 19:42) (77 - 80)  BP: 123/71 (16 Nov 2022 19:42) (113/66 - 129/71)  BP(mean): --  ABP: --  ABP(mean): --  RR: 18 (16 Nov 2022 19:42) (18 - 20)  SpO2: 95% (16 Nov 2022 19:42) (95% - 98%)    O2 Parameters below as of 16 Nov 2022 19:42  Patient On (Oxygen Delivery Method): room air              11-15 @ 07:01  -  11-16 @ 07:00  --------------------------------------------------------  IN: 690 mL / OUT: 1150 mL / NET: -460 mL    11-16 @ 07:01  - 11-17 @ 04:48  --------------------------------------------------------  IN: 430 mL / OUT: 800 mL / NET: -370 mL      CAPILLARY BLOOD GLUCOSE      POCT Blood Glucose.: 106 mg/dL (17 Nov 2022 03:10)      PHYSICAL EXAM: NAD in bed on RA/NGT  General: Awake, alert, oriented X 3.   HEENT: Atraumatic, normocephalic.                 Mallampatti Grade 3                No nasal congestion.                No tonsillar or pharyngeal exudates.  Lymph Nodes: No palpable lymphadenopathy  Neck: No JVD. No carotid bruit.   Respiratory: Normal chest expansion                         Normal percussion                         Normal and equal air entry                         No wheeze, rhonchi or rales.  Cardiovascular: S1 S2 normal. No murmurs, rubs or gallops.   Abdomen: Soft, non-tender, non-distended. No organomegaly. Normoactive bowel sounds.  Extremities: Warm to touch. Peripheral pulse palpable. No pedal edema.   Skin: No rashes or skin lesions  Neurological: Motor and sensory examination equal and normal in all four extremities.  Psychiatry: Appropriate mood and affect.    HOSPITAL MEDICATIONS:  MEDICATIONS  (STANDING):  acetylcysteine 10%  Inhalation 4 milliLiter(s) Inhalation every 12 hours  albuterol/ipratropium for Nebulization 3 milliLiter(s) Nebulizer every 6 hours  apixaban 5 milliGRAM(s) Enteral Tube every 12 hours  ascorbic acid 500 milliGRAM(s) Oral daily  buDESOnide    Inhalation Suspension 0.5 milliGRAM(s) Inhalation every 12 hours  chlorhexidine 2% Cloths 1 Application(s) Topical <User Schedule>  collagenase Ointment 1 Application(s) Topical daily  dextrose 50% Injectable 25 Gram(s) IV Push once  dextrose 50% Injectable 12.5 Gram(s) IV Push once  dextrose 50% Injectable 25 Gram(s) IV Push once  dextrose Oral Gel 15 Gram(s) Oral once  diphenhydramine 2%/zinc acetate 0.1% Cream 1 Application(s) Topical every 8 hours  fluconAZOLE   Tablet 400 milliGRAM(s) Oral daily  glucagon  Injectable 1 milliGRAM(s) IntraMuscular once  hydrALAZINE 50 milliGRAM(s) Oral three times a day  insulin lispro (ADMELOG) corrective regimen sliding scale   SubCutaneous three times a day before meals  insulin lispro (ADMELOG) corrective regimen sliding scale   SubCutaneous <User Schedule>  insulin NPH human recombinant 4 Unit(s) SubCutaneous <User Schedule>  magnesium oxide 400 milliGRAM(s) Oral every 8 hours  methylPREDNISolone 8 milliGRAM(s) Oral daily  metoclopramide Injectable 5 milliGRAM(s) IV Push every 8 hours  metoprolol tartrate 12.5 milliGRAM(s) Oral two times a day  mexiletine 200 milliGRAM(s) Oral every 8 hours  multivitamin 1 Tablet(s) Oral daily  nystatin Powder 1 Application(s) Topical two times a day  pantoprazole  Injectable 40 milliGRAM(s) IV Push daily  sodium chloride 0.9% lock flush 3 milliLiter(s) IV Push every 8 hours  trimethoprim   80 mG/sulfamethoxazole 400 mG 1 Tablet(s) Oral daily    MEDICATIONS  (PRN):  acetaminophen    Suspension .. 650 milliGRAM(s) Oral every 6 hours PRN Temp greater or equal to 38C (100.4F), Mild Pain (1 - 3)  calamine/zinc oxide Lotion 1 Application(s) Topical every 8 hours PRN Itching      LABS:                        9.9    12.27 )-----------( 278      ( 15 Nov 2022 09:31 )             33.6     11-15    139  |  100  |  15  ----------------------------<  195<H>  3.8   |  27  |  0.42<L>    Ca    9.1      15 Nov 2022 09:31  Phos  3.3     11-15  Mg     1.7     11-15    TPro  6.9  /  Alb  3.2<L>  /  TBili  0.2  /  DBili  x   /  AST  15  /  ALT  16  /  AlkPhos  113  11-15              MICROBIOLOGY:     RADIOLOGY:  [ ] Reviewed and interpreted by me    Point of Care Ultrasound Findings:    PFT:    EKG:

## 2022-11-17 NOTE — PROGRESS NOTE ADULT - ASSESSMENT
71 year-old female with a history of DM2, CAD, A-Fib on apixaban, Severe Persistent Asthma (on chronic steroids, recently started on Tezspire), colon cancer s/p resection/chemo, and tracheobronchomalacia s/p tracheoplasty, and recent OM of the R foot s/b debridement and completed course of Vancomycin/Ertapenem for MRSA/ESBL Proteus/Corynebacterium who now presents with chest pain, found to have Type A dissection s/p repair with modified Bentall procedure and hemiarch replacement on 22. Post-op course complicated by acute hypoxemic respiratory failure, shock, anemia, and hyperglycemia requiring insulin gtt. Extubated , now awake and alert with mild encephalopathy but overall significantly improved.    Assessment:  Acute hypoxemic respiratory failure requiring intubation  Type A Aortic Dissection  Severe Persistent Asthma  History of Tracheobronchomalacia    Plan:  See below:  -**************************                                SEE Below:  -improving but weakness  9/15-ABX adjusted to ceftriaxone (LFTS)-PICU  -PICU, low grade temp-fever work up in progress, no resp decline or sx  -resp stable at present but tenuous  -for FEESST today, NGT in place w/TF  -s/p FEESST-passed, remains in PICU          -TF in place at 40cc, more OOB          -hypoglycemia noted and rx, no change in resp status  -vented/sedated-pressors/inotropes/ABX  -remains vented on nitrous/less O2 needs, improving LFTS                   -vented/semi sedated--less O2 needs  10/3-on vanco, weaning sedation/, all lines changed  10/6-no changes-now afebrile-on vanco   10/7-no weaning-remains off pressors  10/10-for trach, no weaning done               10/11-s/p trach-uneventful  10/12-TC and ;cpap done and tolerated well  10/13-weaning TC continues              10/14-mucus issues-TC continues  10/24-TC x mult days-stable-secretion issues remain           10/25-replaced on vent for secretions            10/26-TC in place, mult consults  10/27-no changes-TC continues    10/28-wound care-vac in place  10/31-TC in place, elbow wound vac in place  -resp stable, elbow vac changed--ABX as per ID     -no new issues over the day  11/3-no new events-on TC/OOB as able; ENT f/up for awake laryngoscopy  -ENT f/up, vest use-feels well                                  -no new events--await decannulation   -phonating well, wound vac right arm in place  -awaiting floor bed-board CICU                    11/10-CTU-vanco until -no new events                     -continued ambulation, TC remains  -stable over the day  -decannulated and phonating well, wants rehab  11/15-VSS no acute issues overnight, await swallow eval   VSS; RSR 70-80; npo w/ koafeed tf's; mbs today; diet advanced to puree diet w/ mild thick liquids; noctural tf; insulin adjusted as per H. endo as per diet changes; dental consult- pt removed own dentures 2 days ago and now c/o rt upper jaw/ mouth pain; rt elbow vac changed today; abx as per ID- pt will need chronic suppression as per ID; bactrim and diflucan; ? d/c IV vanco today d/w ID    VSS - pt tolerating puree diet w/ moderately thickened liquids  will d/c nocturnal feeds & kaofeed.  discharge planing- rehab Monday w/ right elbow vac - will ck Covid sun.

## 2022-11-17 NOTE — PROGRESS NOTE ADULT - TIME BILLING
as above: MS right shoulder pain, swallow evaln continues-resp stable-speaking well, ambulating daily/stronger over all  -ID f/up-resp stable--wound care f/up-TC continues- (she will always have secretions) due to TBM-s/p  trach 10/10-s/p  resp failure-s/p [id number] ABX-stable CV status-re- VEST rx in use  multifactorial dyspnea-resp failure-severe persistent asthma, TBM s/p tracheoplasty, s/p Aortic aneurysm repair, Bronchitis (proteus)-O2-keep 90% (RA/NC)  severe persistent asthma--medrol 8mg, singulair 10, duoneb q 6, budes .5 bid, tezspire 8/29-was due 9/29-next upon DC  TBM-s/p tracheoplasty--accapella, vest rx, mucomyst here 2 cc 10% q 8 (secretions)  s/p Aortic aneurysm repair--as per CTS staff care  AF-on eliquis rx               CV-improved hydralazine/lopressor/mexilitine   DVT R-IJ-on oral A/C  *****ID-s/p proteus bronchitis-s/p meropenum changed to ceftriaxone and back to meropenem/vanco- off of ABX as of 9/19--restart of ABX 9/27-meropenem/vanco-s/p  meropenem s/p vanco for MRSE sepsis x 6 wks, plastics/ortho for elbow-proteus (?wash out)-vac in place-suppressive rx-bact/fluconazole as of 11/15  PT-OOB as able                             GI-TF as able--f/up LFTs-normal-swallow evaln in progress       ****ORTHO f/up--? additional wash out of elbow wound?; wound care f/up  **VC dysfunction--aspiration precautions-s/p trach 10/10-ENT f/up s/p laryngoscopy- decannulated  swallow issues-may need new dentures  **right shoulder issues-? ortho consult  Heme onc f/up colon ca  prog--fair                PT-to continue-more OOB     DC planning (flu shot/shingles etc.)    Eulalio Allison MD-Pulmonary   [phone]

## 2022-11-17 NOTE — PROGRESS NOTE ADULT - SUBJECTIVE AND OBJECTIVE BOX
VITAL SIGNS-Telemetry:  SR 75-85  Vital Signs Last 24 Hrs  T(C): 36.6 (22 @ 06:13), Max: 36.6 (22 @ 11:46)  T(F): 97.8 (22 @ 06:13), Max: 97.9 (22 @ 11:46)  HR: 84 (22 @ 06:13) (77 - 84)  BP: 102/67 (22 @ 06:13) (102/67 - 123/71)  RR: 18 (22 @ 06:13) (18 - 18)  SpO2: 95% (22 @ 06:13) (95% - 98%)          @ 07:01  -   @ 07:00  --------------------------------------------------------  IN: 750 mL / OUT: 800 mL / NET: -50 mL    Daily     Daily Weight in k (2022 08:45)    CAPILLARY BLOOD GLUCOSE  POCT Blood Glucose.: 178 mg/dL (2022 10:35)  POCT Blood Glucose.: 134 mg/dL (2022 07:56)  POCT Blood Glucose.: 106 mg/dL (2022 03:10)  POCT Blood Glucose.: 98 mg/dL (2022 21:22)  POCT Blood Glucose.: 89 mg/dL (2022 17:30)  POCT Blood Glucose.: 78 mg/dL (2022 16:48)     PHYSICAL EXAM:  Neurology: alert and oriented x 3, nonfocal, no gross deficits  CV : S1S2  Sternal Wound :  CDI , Stable  Lungs: cta  Abdomen: soft, nontender, nondistended, positive bowel sounds, last bowel movement     -colostomy - patent  Extremities:     no c/c/e    r elbow wound vac  r foot wound w/ dressing     acetaminophen    Suspension .. 650 milliGRAM(s) Oral every 6 hours PRN  acetylcysteine 10%  Inhalation 4 milliLiter(s) Inhalation every 12 hours  albuterol/ipratropium for Nebulization 3 milliLiter(s) Nebulizer every 6 hours  apixaban 5 milliGRAM(s) Enteral Tube every 12 hours  ascorbic acid 500 milliGRAM(s) Oral daily  buDESOnide    Inhalation Suspension 0.5 milliGRAM(s) Inhalation every 12 hours  calamine/zinc oxide Lotion 1 Application(s) Topical every 8 hours PRN  chlorhexidine 2% Cloths 1 Application(s) Topical <User Schedule>  collagenase Ointment 1 Application(s) Topical daily  dextrose 50% Injectable 25 Gram(s) IV Push once  dextrose 50% Injectable 12.5 Gram(s) IV Push once  dextrose 50% Injectable 25 Gram(s) IV Push once  dextrose Oral Gel 15 Gram(s) Oral once  diphenhydramine 2%/zinc acetate 0.1% Cream 1 Application(s) Topical every 8 hours  fluconAZOLE   Tablet 400 milliGRAM(s) Oral daily  glucagon  Injectable 1 milliGRAM(s) IntraMuscular once  hydrALAZINE 50 milliGRAM(s) Oral three times a day  influenza  Vaccine (FLUBLOK) 0.5 milliLiter(s) IntraMuscular once  insulin lispro (ADMELOG) corrective regimen sliding scale   SubCutaneous three times a day before meals  insulin lispro (ADMELOG) corrective regimen sliding scale   SubCutaneous <User Schedule>  insulin NPH human recombinant 4 Unit(s) SubCutaneous <User Schedule>  magnesium oxide 400 milliGRAM(s) Oral every 8 hours  methylPREDNISolone 8 milliGRAM(s) Oral daily  metoclopramide Injectable 5 milliGRAM(s) IV Push every 8 hours  metoprolol tartrate 12.5 milliGRAM(s) Oral two times a day  mexiletine 200 milliGRAM(s) Oral every 8 hours  multivitamin 1 Tablet(s) Oral daily  nystatin Powder 1 Application(s) Topical two times a day  pantoprazole  Injectable 40 milliGRAM(s) IV Push daily  sodium chloride 0.9% lock flush 3 milliLiter(s) IV Push every 8 hours  trimethoprim   80 mG/sulfamethoxazole 400 mG 1 Tablet(s) Oral daily    Physical Therapy Rec:   Home  [  ]   Home w/ PT  [  ]  Rehab  [ x ]  Discussed with Cardiothoracic Team at AM rounds. VITAL SIGNS-Telemetry:  SR 75-85  Vital Signs Last 24 Hrs  T(C): 36.6 (22 @ 06:13), Max: 36.6 (22 @ 11:46)  T(F): 97.8 (22 @ 06:13), Max: 97.9 (22 @ 11:46)  HR: 84 (22 @ 06:13) (77 - 84)  BP: 102/67 (22 @ 06:13) (102/67 - 123/71)  RR: 18 (22 @ 06:13) (18 - 18)  SpO2: 95% (22 @ 06:13) (95% - 98%)          @ 07:01  -   @ 07:00  --------------------------------------------------------  IN: 750 mL / OUT: 800 mL / NET: -50 mL    Daily     Daily Weight in k (2022 08:45)    CAPILLARY BLOOD GLUCOSE  POCT Blood Glucose.: 178 mg/dL (2022 10:35)  POCT Blood Glucose.: 134 mg/dL (2022 07:56)  POCT Blood Glucose.: 106 mg/dL (2022 03:10)  POCT Blood Glucose.: 98 mg/dL (2022 21:22)  POCT Blood Glucose.: 89 mg/dL (2022 17:30)  POCT Blood Glucose.: 78 mg/dL (2022 16:48)     PHYSICAL EXAM:  Neurology: alert and oriented x 3, nonfocal, no gross deficits  CV : S1S2  Sternal Wound :  CDI , Stable  Lungs: rhonchi  Abdomen: soft, nontender, nondistended, positive bowel sounds, last bowel movement     -colostomy - patent  Extremities:     no c/c/e    r elbow wound vac  r foot wound w/ dressing     acetaminophen    Suspension .. 650 milliGRAM(s) Oral every 6 hours PRN  acetylcysteine 10%  Inhalation 4 milliLiter(s) Inhalation every 12 hours  albuterol/ipratropium for Nebulization 3 milliLiter(s) Nebulizer every 6 hours  apixaban 5 milliGRAM(s) Enteral Tube every 12 hours  ascorbic acid 500 milliGRAM(s) Oral daily  buDESOnide    Inhalation Suspension 0.5 milliGRAM(s) Inhalation every 12 hours  calamine/zinc oxide Lotion 1 Application(s) Topical every 8 hours PRN  chlorhexidine 2% Cloths 1 Application(s) Topical <User Schedule>  collagenase Ointment 1 Application(s) Topical daily  dextrose 50% Injectable 25 Gram(s) IV Push once  dextrose 50% Injectable 12.5 Gram(s) IV Push once  dextrose 50% Injectable 25 Gram(s) IV Push once  dextrose Oral Gel 15 Gram(s) Oral once  diphenhydramine 2%/zinc acetate 0.1% Cream 1 Application(s) Topical every 8 hours  fluconAZOLE   Tablet 400 milliGRAM(s) Oral daily  glucagon  Injectable 1 milliGRAM(s) IntraMuscular once  hydrALAZINE 50 milliGRAM(s) Oral three times a day  influenza  Vaccine (FLUBLOK) 0.5 milliLiter(s) IntraMuscular once  insulin lispro (ADMELOG) corrective regimen sliding scale   SubCutaneous three times a day before meals  insulin lispro (ADMELOG) corrective regimen sliding scale   SubCutaneous <User Schedule>  insulin NPH human recombinant 4 Unit(s) SubCutaneous <User Schedule>  magnesium oxide 400 milliGRAM(s) Oral every 8 hours  methylPREDNISolone 8 milliGRAM(s) Oral daily  metoclopramide Injectable 5 milliGRAM(s) IV Push every 8 hours  metoprolol tartrate 12.5 milliGRAM(s) Oral two times a day  mexiletine 200 milliGRAM(s) Oral every 8 hours  multivitamin 1 Tablet(s) Oral daily  nystatin Powder 1 Application(s) Topical two times a day  pantoprazole  Injectable 40 milliGRAM(s) IV Push daily  sodium chloride 0.9% lock flush 3 milliLiter(s) IV Push every 8 hours  trimethoprim   80 mG/sulfamethoxazole 400 mG 1 Tablet(s) Oral daily    Physical Therapy Rec:   Home  [  ]   Home w/ PT  [  ]  Rehab  [ x ]  Discussed with Cardiothoracic Team at AM rounds.

## 2022-11-17 NOTE — PROGRESS NOTE ADULT - ASSESSMENT
71 year-old female with a history of DM2, CAD, A-Fib on apixaban, Severe Persistent Asthma (on chronic steroids, recently started on Tezspire), colon cancer s/p resection/chemo, and tracheobronchomalacia s/p tracheoplasty, and recent OM of the R foot s/b debridement and completed course of Vancomycin/Ertapenem for MRSA/ESBL Proteus/Corynebacterium who now presents with chest pain, found to have Type A dissection s/p repair with modified Bentall procedure and hemiarch replacement on 22. Post-op course complicated by acute hypoxemic respiratory failure, shock, anemia, and hyperglycemia requiring insulin gtt. Extubated -trached/decannulated.    Assessment:  Acute hypoxemic respiratory failure requiring intubation  Type A Aortic Dissection  Severe Persistent Asthma  History of Tracheobronchomalacia    Plan:  See below:  -**************************                                SEE Below:  -improving but weakness  9/15-ABX adjusted to ceftriaxone (LFTS)-PICU  -PICU, low grade temp-fever work up in progress, no resp decline or sx  -resp stable at present but tenuous  -for FEESST today, NGT in place w/TF  -s/p FEESST-passed, remains in PICU          -TF in place at 40cc, more OOB          -hypoglycemia noted and rx, no change in resp status  -vented/sedated-pressors/inotropes/ABX  -remains vented on nitrous/less O2 needs, improving LFTS                   -vented/semi sedated--less O2 needs  10/3-on vanco, weaning sedation/, all lines changed  10/6-no changes-now afebrile-on vanco   10/7-no weaning-remains off pressors  10/10-for trach, no weaning done               10/11-s/p trach-uneventful  10/12-TC and ;cpap done and tolerated well  10/13-weaning TC continues              10/14-mucus issues-TC continues  10/24-TC x mult days-stable-secretion issues remain           10/25-replaced on vent for secretions            10/26-TC in place, mult consults  10/27-no changes-TC continues    10/28-wound care-vac in place  10/31-TC in place, elbow wound vac in place  -resp stable, elbow vac changed--ABX as per ID     -no new issues over the day  11/3-no new events-on TC/OOB as able; ENT f/up for awake laryngoscopy  -ENT f/up, vest use-feels well                                  -no new events--await decannulation   -phonating well, wound vac right arm in place  -awaiting floor bed-board CICU                    11/10-CTU-vanco until -no new events                     -continued ambulation, TC remains  -stable over the day  -decannulated and phonating well, wants rehab  11/15-tx floor, await swallow evaln  -swallow evaln in progress    -MS pain right shoulder

## 2022-11-17 NOTE — PROGRESS NOTE ADULT - NUTRITIONAL ASSESSMENT
This patient has been assessed with a concern for Malnutrition and has been determined to have a diagnosis/diagnoses of Moderate protein-calorie malnutrition.    This patient is being managed with:   Diet Pureed-  Mildly Thick Liquids (MILDTHICKLIQS)  Liquid via Teaspoon Only No Straws  Tube Feeding Modality: Nasogastric  Glucerna 1.5 Chago (GLUCERNA1.5RTH)  Total Volume for 24 Hours (mL): 495  Intermittent  Until Goal Tube Feed Rate (mL per Hour): 55  Tube Feeding Hours ON: 9  Tube Feeding OFF (Hours): 15  Tube Feed Start Time: 22:00  Entered: Nov 16 2022 12:22PM

## 2022-11-17 NOTE — PROGRESS NOTE ADULT - PROBLEM SELECTOR PLAN 2
SABINO. Qasim following   continue admelog SS   11/17b puree diet w/ mod thick liq  will d/c kaofeed & nocturnal feeds  continue NPH as per endo   accuchecks q6 hours

## 2022-11-17 NOTE — PROGRESS NOTE ADULT - PROBLEM SELECTOR PLAN 1
s/p repair with modified Bentall procedure and hemiarch replacement on 9/6/22  continue eliquis for AC  continue lop 12.5 bid and mexitil 200 q8 for postop afib now rsr  continue hydral 50 q8 for htn  continue bronchodilators and pred 8 mg qd for COPD   dental called today: pt removed own dentures 2 days ago and now c/o rt upper jaw/ mouth pain   rt elbow vac changed today  abx as per ID- pt will need chronic suppression as per ID; bactrim and diflucan; ? d/c IV vanco today d/w ID   pulm toilet  pain management  increase activity as tolerated  bowel regimen  discharge planning- rehab when stable

## 2022-11-17 NOTE — PROGRESS NOTE ADULT - ASSESSMENT
72 F w/h/o uncontrolled T2DM (A1C 9.4%) on basal/bolus insulin PTA. Unknown DM complications. Also COPD, secondary adrenal Insufficiency on chronic steroids, colorectal cancer s/p resection (colostomy bag), Chronic A fib on Eliquis, and tracheomalacia and multiple intubations, recent dx of OM presented to ED at [facility] with epigastric pain, belching and central chest pain. Found on CTA to have type A aortic dissection and transferred to [facility] for surgical evaluation by Dr. Cabrera. Now s/p aortic dissection repair on 9/07/22, sepsis, and now s/p trach 10/10 and more recently s/p R elbow eschar debridement 10/22 > Wound VAC 10/24. Endocrine consulted for assistance with uncontrolled DM/steroids for AI.     Type 2 diabetes mellitus with hyperglycemia.   ·  Plan:   -on overnight TF with po intake in daytime  -test BG ac meals/hs and 3am daily   -Please treat hypoglycemia as per protocol. Pt hypoglycemic but noted hypoglycemic protocol not followed. Spoke to team NP and RN  -If BG remains <100 might need D5 infusion at 30cc/hr until TFs are restarted tonight  -Adjust NPH 3 units at start of TF (10pm)   -c/w Admelog low correction scale ac/hs and 3am for now.  Discharge plan:  - Likely to discharge patient on basal/bolus insulin. Final regimen pending clinical course.  - Recommend routine outpatient ophthalmology, podiatry  - Can f/u with endocrinologist Dr. Saavedra.  - Make sure pt has Rx for all DM supplies and insulin/ DM meds.  -Based on pt's clinical condition and mental status at this time she is not able to independently manage her DM. Will evaluate pt's ability to manage DM at time of discharge. If unable> pt will need family/ care giver (s) to manage DM care at home  -Will need rehab.     Problem/Plan - 2:  ·  Problem: Adrenal insufficiency.   ·  Plan: On chronic steroids at home: medrol 8mg qd   C/w Prednisone 8mg qd   Will need stress steroids if acutely ill.     Problem/Plan - 3:  ·  Problem: Hyperlipidemia.   ·  Plan: Off rosuvastatin 5mg daily  Re start if not contraindicated and as per cardiology  -F/u levels as out pt.      discussed with patient and RN   Contact via Microsoft Teams during business hours  On evenings and weekends (or if no response on Microsoft Teams) please call [phone] or page endocrine fellow on call.   For nonurgent matters please email [email or web address]    Please note that this patient may be followed by different provider tomorrow.  Notify endocrine 24 hours prior to discharge for final recommendations    greater than 50% of the encounter was spent counseling and/or coordination of care.  26 minutes spent on total encounter; The necessity of the time spent during the encounter on this date of service was due to development of plan of care/coordination of care/glycemic control through review of labs, blood glucose values and vital signs.    72 F w/h/o uncontrolled T2DM (A1C 9.4%) on basal/bolus insulin PTA. Unknown DM complications. Also COPD, secondary adrenal Insufficiency on chronic steroids, colorectal cancer s/p resection (colostomy bag), Chronic A fib on Eliquis, and tracheomalacia and multiple intubations, recent dx of OM presented to ED at [facility] with epigastric pain, belching and central chest pain. Found on CTA to have type A aortic dissection and transferred to [facility] for surgical evaluation by Dr. Cabrera. Now s/p aortic dissection repair on 9/07/22, sepsis, and now s/p trach 10/10 and more recently s/p R elbow eschar debridement 10/22 > Wound VAC 10/24. Endocrine consulted for assistance with uncontrolled DM/steroids for AI.     Type 2 diabetes mellitus with hyperglycemia.   ·  Plan:   -on overnight TF with po intake in daytime  -test BG ac meals/hs and 3am daily   -Please treat hypoglycemia as per protocol.   -Adjust NPH 3 units at start of TF (10pm)   -c/w Admelog low correction scale ac/hs and 3am for now.  Discharge plan:  - Likely to discharge patient on basal/bolus insulin. Final regimen pending clinical course.  - Recommend routine outpatient ophthalmology, podiatry  - Can f/u with endocrinologist Dr. Saavedra.  - Make sure pt has Rx for all DM supplies and insulin/ DM meds.  -Based on pt's clinical condition and mental status at this time she is not able to independently manage her DM. Will evaluate pt's ability to manage DM at time of discharge. If unable> pt will need family/ care giver (s) to manage DM care at home  -Will need rehab.     Problem/Plan - 2:  ·  Problem: Adrenal insufficiency.   ·  Plan: On chronic steroids at home: medrol 8mg qd   C/w Prednisone 8mg qd   Will need stress steroids if acutely ill.     Problem/Plan - 3:  ·  Problem: Hyperlipidemia.   ·  Plan: Off rosuvastatin 5mg daily  Re start if not contraindicated and as per cardiology  -F/u levels as out pt.      discussed with patient and RN   Contact via Microsoft Teams during business hours  On evenings and weekends (or if no response on Microsoft Teams) please call [phone] or page endocrine fellow on call.   For nonurgent matters please email [email or web address]    Please note that this patient may be followed by different provider tomorrow.  Notify endocrine 24 hours prior to discharge for final recommendations    greater than 50% of the encounter was spent counseling and/or coordination of care.  26 minutes spent on total encounter; The necessity of the time spent during the encounter on this date of service was due to development of plan of care/coordination of care/glycemic control through review of labs, blood glucose values and vital signs.

## 2022-11-17 NOTE — PROGRESS NOTE ADULT - SUBJECTIVE AND OBJECTIVE BOX
seen earlier today     Chief Complaint: Type 2 Diabetes Mellitus     INTERVAL HX: on overnight feeds with overnight BG tightly controlled, no hypoglycemia . TF Off during day, tolerating puree diet with postp randial BG reasonable today. c/o fatigue asking to have FS checked, PCA at bedside checking FS , no hypoglycemia       Review of Systems:  General: As above  Cardiovascular: No chest pain  Respiratory: No SOB  GI: No nausea, vomiting  Endocrine: no  S&Sx of hypoglycemia    Allergies    aspirin (Short breath)  Avelox (Short breath; Pruritus)  cefepime (Anaphylaxis)  codeine (Short breath)  Dilaudid (Short breath)  iodine (Short breath; Swelling)  penicillin (Anaphylaxis)  shellfish (Anaphylaxis)  tetanus toxoid (Short breath)  Valium (Short breath)    Intolerances      MEDICATIONS  (STANDING):  acetylcysteine 10%  Inhalation 4 milliLiter(s) Inhalation every 12 hours  albuterol/ipratropium for Nebulization 3 milliLiter(s) Nebulizer every 6 hours  apixaban 5 milliGRAM(s) Enteral Tube every 12 hours  ascorbic acid 500 milliGRAM(s) Oral daily  buDESOnide    Inhalation Suspension 0.5 milliGRAM(s) Inhalation every 12 hours  chlorhexidine 2% Cloths 1 Application(s) Topical <User Schedule>  collagenase Ointment 1 Application(s) Topical daily  dextrose 50% Injectable 25 Gram(s) IV Push once  dextrose 50% Injectable 12.5 Gram(s) IV Push once  dextrose 50% Injectable 25 Gram(s) IV Push once  dextrose Oral Gel 15 Gram(s) Oral once  diphenhydramine 2%/zinc acetate 0.1% Cream 1 Application(s) Topical every 8 hours  fluconAZOLE   Tablet 400 milliGRAM(s) Oral daily  glucagon  Injectable 1 milliGRAM(s) IntraMuscular once  hydrALAZINE 50 milliGRAM(s) Oral three times a day  influenza  Vaccine (FLUBLOK) 0.5 milliLiter(s) IntraMuscular once  insulin lispro (ADMELOG) corrective regimen sliding scale   SubCutaneous three times a day before meals  insulin lispro (ADMELOG) corrective regimen sliding scale   SubCutaneous <User Schedule>  insulin NPH human recombinant 4 Unit(s) SubCutaneous <User Schedule>  magnesium oxide 400 milliGRAM(s) Oral every 8 hours  methylPREDNISolone 8 milliGRAM(s) Oral daily  metoclopramide Injectable 5 milliGRAM(s) IV Push every 8 hours  metoprolol tartrate 12.5 milliGRAM(s) Oral two times a day  mexiletine 200 milliGRAM(s) Oral every 8 hours  multivitamin 1 Tablet(s) Oral daily  nystatin Powder 1 Application(s) Topical two times a day  pantoprazole  Injectable 40 milliGRAM(s) IV Push daily  sodium chloride 0.9% lock flush 3 milliLiter(s) IV Push every 8 hours  trimethoprim   80 mG/sulfamethoxazole 400 mG 1 Tablet(s) Oral daily        insulin lispro (ADMELOG) corrective regimen sliding scale   1 Unit(s) SubCutaneous (11-17-22 @ 11:46)    insulin NPH human recombinant   4 Unit(s) SubCutaneous (11-16-22 @ 22:07)    methylPREDNISolone   8 milliGRAM(s) Oral (11-17-22 @ 06:10)        PHYSICAL EXAM:  VITALS: T(C): 36.4 (11-17-22 @ 12:33)  T(F): 97.5 (11-17-22 @ 12:33), Max: 97.8 (11-17-22 @ 06:13)  HR: 88 (11-17-22 @ 12:33) (80 - 88)  BP: 129/92 (11-17-22 @ 12:33) (102/67 - 129/92)  RR:  (18 - 18)  SpO2:  (93% - 95%)  Wt(kg): --  GENERAL: female laying in bed  in NAD, NGT , wound vac present  Respiratory: Respirations unlabored   Extremities: Warm  NEURO: Alert , appropriate     LABS:  POCT Blood Glucose.: 179 mg/dL (11-17-22 @ 11:40)  POCT Blood Glucose.: 178 mg/dL (11-17-22 @ 10:35)  POCT Blood Glucose.: 134 mg/dL (11-17-22 @ 07:56)  POCT Blood Glucose.: 106 mg/dL (11-17-22 @ 03:10)  POCT Blood Glucose.: 98 mg/dL (11-16-22 @ 21:22)  POCT Blood Glucose.: 89 mg/dL (11-16-22 @ 17:30)  POCT Blood Glucose.: 78 mg/dL (11-16-22 @ 16:48)  POCT Blood Glucose.: 80 mg/dL (11-16-22 @ 16:18)  POCT Blood Glucose.: 56 mg/dL (11-16-22 @ 15:40)  POCT Blood Glucose.: 98 mg/dL (11-16-22 @ 12:57)  POCT Blood Glucose.: 209 mg/dL (11-16-22 @ 06:23)  POCT Blood Glucose.: 116 mg/dL (11-16-22 @ 00:36)  POCT Blood Glucose.: 200 mg/dL (11-15-22 @ 16:42)  POCT Blood Glucose.: 263 mg/dL (11-15-22 @ 12:06)  POCT Blood Glucose.: 220 mg/dL (11-15-22 @ 06:10)  POCT Blood Glucose.: 168 mg/dL (11-14-22 @ 23:52)  POCT Blood Glucose.: 136 mg/dL (11-14-22 @ 17:56)  POCT Blood Glucose.: 305 mg/dL (11-14-22 @ 13:53)                          10.0   15.27 )-----------( 280      ( 17 Nov 2022 10:09 )             33.4     11-17    135  |  99  |  14  ----------------------------<  196<H>  4.4   |  25  |  0.47<L>    Ca    9.2      17 Nov 2022 10:09      eGFR: 100 mL/min/1.73m2 (17 Nov 2022 10:09)      Thyroid Function Tests:      A1C with Estimated Average Glucose Result: 9.4 % (09-06-22 @ 16:46)    Estimated Average Glucose: 223 mg/dL (09-06-22 @ 16:46)        Diet, Pureed:   Mildly Thick Liquids (MILDTHICKLIQS)  Liquid via Teaspoon Only No Straws  Tube Feeding Modality: Nasogastric  Glucerna 1.5 Chago (GLUCERNA1.5RTH)  Total Volume for 24 Hours (mL): 495  Intermittent  Until Goal Tube Feed Rate (mL per Hour): 55  Tube Feeding Hours ON: 9  Tube Feeding OFF (Hours): 15  Tube Feed Start Time: 22:00 (11-16-22 @ 12:22) [Active]              seen earlier today     Chief Complaint: Type 2 Diabetes Mellitus     INTERVAL HX: no further hypoglycemia today. on overnight feeds with overnight BG tightly controlled . TF Off during day, tolerating puree diet with postp randial BG reasonable today. c/o fatigue asking to have FS checked, PCA at bedside checking FS , no hypoglycemia       Review of Systems:  General: As above  Cardiovascular: No chest pain  Respiratory: No SOB  GI: No nausea, vomiting  Endocrine: no  S&Sx of hypoglycemia    Allergies    aspirin (Short breath)  Avelox (Short breath; Pruritus)  cefepime (Anaphylaxis)  codeine (Short breath)  Dilaudid (Short breath)  iodine (Short breath; Swelling)  penicillin (Anaphylaxis)  shellfish (Anaphylaxis)  tetanus toxoid (Short breath)  Valium (Short breath)    Intolerances      MEDICATIONS  (STANDING):  acetylcysteine 10%  Inhalation 4 milliLiter(s) Inhalation every 12 hours  albuterol/ipratropium for Nebulization 3 milliLiter(s) Nebulizer every 6 hours  apixaban 5 milliGRAM(s) Enteral Tube every 12 hours  ascorbic acid 500 milliGRAM(s) Oral daily  buDESOnide    Inhalation Suspension 0.5 milliGRAM(s) Inhalation every 12 hours  chlorhexidine 2% Cloths 1 Application(s) Topical <User Schedule>  collagenase Ointment 1 Application(s) Topical daily  dextrose 50% Injectable 25 Gram(s) IV Push once  dextrose 50% Injectable 12.5 Gram(s) IV Push once  dextrose 50% Injectable 25 Gram(s) IV Push once  dextrose Oral Gel 15 Gram(s) Oral once  diphenhydramine 2%/zinc acetate 0.1% Cream 1 Application(s) Topical every 8 hours  fluconAZOLE   Tablet 400 milliGRAM(s) Oral daily  glucagon  Injectable 1 milliGRAM(s) IntraMuscular once  hydrALAZINE 50 milliGRAM(s) Oral three times a day  influenza  Vaccine (FLUBLOK) 0.5 milliLiter(s) IntraMuscular once  insulin lispro (ADMELOG) corrective regimen sliding scale   SubCutaneous three times a day before meals  insulin lispro (ADMELOG) corrective regimen sliding scale   SubCutaneous <User Schedule>  insulin NPH human recombinant 4 Unit(s) SubCutaneous <User Schedule>  magnesium oxide 400 milliGRAM(s) Oral every 8 hours  methylPREDNISolone 8 milliGRAM(s) Oral daily  metoclopramide Injectable 5 milliGRAM(s) IV Push every 8 hours  metoprolol tartrate 12.5 milliGRAM(s) Oral two times a day  mexiletine 200 milliGRAM(s) Oral every 8 hours  multivitamin 1 Tablet(s) Oral daily  nystatin Powder 1 Application(s) Topical two times a day  pantoprazole  Injectable 40 milliGRAM(s) IV Push daily  sodium chloride 0.9% lock flush 3 milliLiter(s) IV Push every 8 hours  trimethoprim   80 mG/sulfamethoxazole 400 mG 1 Tablet(s) Oral daily        insulin lispro (ADMELOG) corrective regimen sliding scale   1 Unit(s) SubCutaneous (11-17-22 @ 11:46)    insulin NPH human recombinant   4 Unit(s) SubCutaneous (11-16-22 @ 22:07)    methylPREDNISolone   8 milliGRAM(s) Oral (11-17-22 @ 06:10)        PHYSICAL EXAM:  VITALS: T(C): 36.4 (11-17-22 @ 12:33)  T(F): 97.5 (11-17-22 @ 12:33), Max: 97.8 (11-17-22 @ 06:13)  HR: 88 (11-17-22 @ 12:33) (80 - 88)  BP: 129/92 (11-17-22 @ 12:33) (102/67 - 129/92)  RR:  (18 - 18)  SpO2:  (93% - 95%)  Wt(kg): --  GENERAL: female laying in bed  in NAD, NGT , wound vac present  Respiratory: Respirations unlabored   Extremities: Warm  NEURO: Alert , appropriate     LABS:  POCT Blood Glucose.: 179 mg/dL (11-17-22 @ 11:40)  POCT Blood Glucose.: 178 mg/dL (11-17-22 @ 10:35)  POCT Blood Glucose.: 134 mg/dL (11-17-22 @ 07:56)  POCT Blood Glucose.: 106 mg/dL (11-17-22 @ 03:10)  POCT Blood Glucose.: 98 mg/dL (11-16-22 @ 21:22)  POCT Blood Glucose.: 89 mg/dL (11-16-22 @ 17:30)  POCT Blood Glucose.: 78 mg/dL (11-16-22 @ 16:48)  POCT Blood Glucose.: 80 mg/dL (11-16-22 @ 16:18)  POCT Blood Glucose.: 56 mg/dL (11-16-22 @ 15:40)  POCT Blood Glucose.: 98 mg/dL (11-16-22 @ 12:57)  POCT Blood Glucose.: 209 mg/dL (11-16-22 @ 06:23)  POCT Blood Glucose.: 116 mg/dL (11-16-22 @ 00:36)  POCT Blood Glucose.: 200 mg/dL (11-15-22 @ 16:42)  POCT Blood Glucose.: 263 mg/dL (11-15-22 @ 12:06)  POCT Blood Glucose.: 220 mg/dL (11-15-22 @ 06:10)  POCT Blood Glucose.: 168 mg/dL (11-14-22 @ 23:52)  POCT Blood Glucose.: 136 mg/dL (11-14-22 @ 17:56)  POCT Blood Glucose.: 305 mg/dL (11-14-22 @ 13:53)                          10.0   15.27 )-----------( 280      ( 17 Nov 2022 10:09 )             33.4     11-17    135  |  99  |  14  ----------------------------<  196<H>  4.4   |  25  |  0.47<L>    Ca    9.2      17 Nov 2022 10:09      eGFR: 100 mL/min/1.73m2 (17 Nov 2022 10:09)      Thyroid Function Tests:      A1C with Estimated Average Glucose Result: 9.4 % (09-06-22 @ 16:46)    Estimated Average Glucose: 223 mg/dL (09-06-22 @ 16:46)        Diet, Pureed:   Mildly Thick Liquids (MILDTHICKLIQS)  Liquid via Teaspoon Only No Straws  Tube Feeding Modality: Nasogastric  Glucerna 1.5 Chago (GLUCERNA1.5RTH)  Total Volume for 24 Hours (mL): 495  Intermittent  Until Goal Tube Feed Rate (mL per Hour): 55  Tube Feeding Hours ON: 9  Tube Feeding OFF (Hours): 15  Tube Feed Start Time: 22:00 (11-16-22 @ 12:22) [Active]

## 2022-11-17 NOTE — PROGRESS NOTE ADULT - NUTRITIONAL ASSESSMENT
Diet, Pureed:   Mildly Thick Liquids (MILDTHICKLIQS)  Liquid via Teaspoon Only No Straws  Tube Feeding Modality: Nasogastric  Glucerna 1.5 Chago (GLUCERNA1.5RTH)  Total Volume for 24 Hours (mL): 495  Intermittent  Until Goal Tube Feed Rate (mL per Hour): 55  Tube Feeding Hours ON: 9  Tube Feeding OFF (Hours): 15  Tube Feed Start Time: 22:00 (11-16-22 @ 12:22) [Active]

## 2022-11-18 LAB
GLUCOSE BLDC GLUCOMTR-MCNC: 133 MG/DL — HIGH (ref 70–99)
GLUCOSE BLDC GLUCOMTR-MCNC: 184 MG/DL — HIGH (ref 70–99)
GLUCOSE BLDC GLUCOMTR-MCNC: 211 MG/DL — HIGH (ref 70–99)
GLUCOSE BLDC GLUCOMTR-MCNC: 248 MG/DL — HIGH (ref 70–99)
GLUCOSE BLDC GLUCOMTR-MCNC: 266 MG/DL — HIGH (ref 70–99)

## 2022-11-18 PROCEDURE — 99232 SBSQ HOSP IP/OBS MODERATE 35: CPT

## 2022-11-18 RX ORDER — INSULIN LISPRO 100/ML
2 VIAL (ML) SUBCUTANEOUS
Refills: 0 | Status: DISCONTINUED | OUTPATIENT
Start: 2022-11-18 | End: 2022-11-19

## 2022-11-18 RX ADMIN — NYSTATIN CREAM 1 APPLICATION(S): 100000 CREAM TOPICAL at 17:43

## 2022-11-18 RX ADMIN — APIXABAN 5 MILLIGRAM(S): 2.5 TABLET, FILM COATED ORAL at 17:40

## 2022-11-18 RX ADMIN — Medication 2: at 08:04

## 2022-11-18 RX ADMIN — Medication 1 TABLET(S): at 13:45

## 2022-11-18 RX ADMIN — CHLORHEXIDINE GLUCONATE 1 APPLICATION(S): 213 SOLUTION TOPICAL at 06:06

## 2022-11-18 RX ADMIN — Medication 3 MILLILITER(S): at 17:41

## 2022-11-18 RX ADMIN — SODIUM CHLORIDE 3 MILLILITER(S): 9 INJECTION INTRAMUSCULAR; INTRAVENOUS; SUBCUTANEOUS at 05:45

## 2022-11-18 RX ADMIN — Medication 3 MILLILITER(S): at 11:49

## 2022-11-18 RX ADMIN — Medication 50 MILLIGRAM(S): at 05:54

## 2022-11-18 RX ADMIN — Medication 1 APPLICATION(S): at 13:48

## 2022-11-18 RX ADMIN — Medication 12.5 MILLIGRAM(S): at 05:54

## 2022-11-18 RX ADMIN — SODIUM CHLORIDE 3 MILLILITER(S): 9 INJECTION INTRAMUSCULAR; INTRAVENOUS; SUBCUTANEOUS at 00:37

## 2022-11-18 RX ADMIN — Medication 4 MILLILITER(S): at 17:41

## 2022-11-18 RX ADMIN — Medication 12.5 MILLIGRAM(S): at 17:40

## 2022-11-18 RX ADMIN — MEXILETINE HYDROCHLORIDE 200 MILLIGRAM(S): 150 CAPSULE ORAL at 05:55

## 2022-11-18 RX ADMIN — Medication 5 MILLIGRAM(S): at 05:52

## 2022-11-18 RX ADMIN — Medication 3: at 11:50

## 2022-11-18 RX ADMIN — PANTOPRAZOLE SODIUM 40 MILLIGRAM(S): 20 TABLET, DELAYED RELEASE ORAL at 13:45

## 2022-11-18 RX ADMIN — SODIUM CHLORIDE 3 MILLILITER(S): 9 INJECTION INTRAMUSCULAR; INTRAVENOUS; SUBCUTANEOUS at 22:11

## 2022-11-18 RX ADMIN — Medication 5 MILLIGRAM(S): at 21:25

## 2022-11-18 RX ADMIN — Medication 1 APPLICATION(S): at 13:47

## 2022-11-18 RX ADMIN — Medication 3 MILLILITER(S): at 00:10

## 2022-11-18 RX ADMIN — Medication 0.5 MILLIGRAM(S): at 17:42

## 2022-11-18 RX ADMIN — Medication 50 MILLIGRAM(S): at 21:28

## 2022-11-18 RX ADMIN — Medication 2 UNIT(S): at 17:39

## 2022-11-18 RX ADMIN — MAGNESIUM OXIDE 400 MG ORAL TABLET 400 MILLIGRAM(S): 241.3 TABLET ORAL at 21:26

## 2022-11-18 RX ADMIN — FLUCONAZOLE 400 MILLIGRAM(S): 150 TABLET ORAL at 13:56

## 2022-11-18 RX ADMIN — MAGNESIUM OXIDE 400 MG ORAL TABLET 400 MILLIGRAM(S): 241.3 TABLET ORAL at 13:44

## 2022-11-18 RX ADMIN — Medication 500 MILLIGRAM(S): at 13:49

## 2022-11-18 RX ADMIN — MAGNESIUM OXIDE 400 MG ORAL TABLET 400 MILLIGRAM(S): 241.3 TABLET ORAL at 05:52

## 2022-11-18 RX ADMIN — MEXILETINE HYDROCHLORIDE 200 MILLIGRAM(S): 150 CAPSULE ORAL at 21:27

## 2022-11-18 RX ADMIN — Medication 1: at 17:39

## 2022-11-18 RX ADMIN — Medication 5 MILLIGRAM(S): at 13:45

## 2022-11-18 RX ADMIN — Medication 1 TABLET(S): at 13:44

## 2022-11-18 RX ADMIN — Medication 8 MILLIGRAM(S): at 05:51

## 2022-11-18 RX ADMIN — NYSTATIN CREAM 1 APPLICATION(S): 100000 CREAM TOPICAL at 05:53

## 2022-11-18 RX ADMIN — MEXILETINE HYDROCHLORIDE 200 MILLIGRAM(S): 150 CAPSULE ORAL at 13:43

## 2022-11-18 RX ADMIN — APIXABAN 5 MILLIGRAM(S): 2.5 TABLET, FILM COATED ORAL at 05:54

## 2022-11-18 RX ADMIN — SODIUM CHLORIDE 3 MILLILITER(S): 9 INJECTION INTRAMUSCULAR; INTRAVENOUS; SUBCUTANEOUS at 14:19

## 2022-11-18 NOTE — PROGRESS NOTE ADULT - TIME BILLING
as above: NGT out and eating, right shoulder pain,resp stable-speaking well, ambulating daily/stronger over all  -ID f/up-resp stable--wound care f/up-TC continues- (she will always have secretions) due to TBM-s/p  trach 10/10-s/p  resp failure-s/p [id number] ABX-stable CV status-re- VEST rx in use  multifactorial dyspnea-resp failure-severe persistent asthma, TBM s/p tracheoplasty, s/p Aortic aneurysm repair, Bronchitis (proteus)-O2-keep 90% (RA/NC)  severe persistent asthma--medrol 8mg, singulair 10, duoneb q 6, budes .5 bid, tezspire 8/29-was due 9/29-next upon DC  TBM-s/p tracheoplasty--accapella, vest rx, mucomyst here 2 cc 10% q 8 (secretions)  s/p Aortic aneurysm repair--as per CTS staff care  AF-on eliquis rx               CV-improved hydralazine/lopressor/mexilitine   DVT R-IJ-on oral A/C  *****ID-s/p proteus bronchitis-s/p meropenum changed to ceftriaxone and back to meropenem/vanco- off of ABX as of 9/19--restart of ABX 9/27-meropenem/vanco-s/p  meropenem s/p vanco for MRSE sepsis x 6 wks, plastics/ortho for elbow-proteus (?wash out)-vac in place-suppressive rx-bact/fluconazole as of 11/15  PT-OOB as able                             GI-TF as able--f/up LFTs-normal-NGT out-eating      ****ORTHO f/up--? additional wash out of elbow wound?; wound care f/up  **VC dysfunction--aspiration precautions-s/p trach 10/10-ENT f/up s/p laryngoscopy- decannulated  swallow issues-may need new dentures  **right shoulder issues-? ortho re-consult  Heme onc f/up colon ca  prog--fair                PT-to continue-more OOB     DC planning (flu shot/shingles etc.)    Eulalio Allison MD-Pulmonary   [phone]

## 2022-11-18 NOTE — PROGRESS NOTE ADULT - PROBLEM SELECTOR PLAN 1
-test BG ac meals/hs and 2am daily   -Discontinue NPH insulin [facility] ept is off TFs now  -Add 2 unitst Admelog ac meals for now. HOLD IF PT NOT EATING  -c/W Admelog low correction scale ac/hs and 2am for now.  -Will adjust insulin as needed  Discharge plan:  - Likely to discharge patient on basal/bolus insulin. Final regimen pending clinical course.  - Recommend routine outpatient ophthalmology, podiatry  - Can f/u with endocrinologist Dr. Saavedra.  - Make sure pt has Rx for all DM supplies and insulin/ DM meds.  -Based on pt's clinical condition and mental status at this time she is not able to independently manage her DM. Will evaluate pt's ability to manage DM at time of discharge. If unable> pt will need family/ care giver (s) to manage DM care once at home  -Will need rehab.

## 2022-11-18 NOTE — PROGRESS NOTE ADULT - PROBLEM SELECTOR PLAN 5
ID following   MRSA bacteremia last + culture 9/28  last positive Candida blood culture 9/30  MRSA  bacteremia and candidemia  Per Simon plan to treat for 6 weeks in the setting of post surgical repair of thoracic aorta dissection through 11/15/22 after which she may need chronic oral suppression with Bactrim plus fluconazole  confirm d/c IV vanco today w/ ID ID following   MRSA bacteremia last + culture 9/28  last positive Candida blood culture 9/30  MRSA  bacteremia and candidemia  Per IDYoni plan to treat for 6 weeks in the setting of post surgical repair of thoracic aorta dissection through 11/15/22 after which she may need chronic oral suppression with Bactrim plus fluconazole  IV vanco d/c 11/16 as per ID

## 2022-11-18 NOTE — PROGRESS NOTE ADULT - NUTRITIONAL ASSESSMENT
Diet, Pureed:   Mildly Thick Liquids (MILDTHICKLIQS)  Liquid via Teaspoon Only No Straws (11-17-22 @ 17:44) [Active]    Please see RD assessment and/or follow up.  Managed by primary team as well

## 2022-11-18 NOTE — PROGRESS NOTE ADULT - PROBLEM SELECTOR PLAN 1
s/p repair with modified Bentall procedure and hemiarch replacement on 9/6/22  continue eliquis for AC  continue lop 12.5 bid and mexitil 200 q8 for postop afib now rsr  continue hydral 50 q8 for htn  continue bronchodilators and pred 8 mg qd for COPD   dental called today: pt removed own dentures 2 days ago and now c/o rt upper jaw/ mouth pain   rt elbow vac changed today  abx as per ID- pt will need chronic suppression as per ID; bactrim and diflucan; ? d/c IV vanco today d/w ID   pulm toilet  pain management  increase activity as tolerated  bowel regimen  discharge planning- rehab when stable s/p repair with modified Bentall procedure and hemiarch replacement on 9/6/22  continue eliquis for AC  continue lop 12.5 bid and mexitil 200 q8 for postop afib now rsr  continue hydral 50 q8 for htn  continue bronchodilators and pred 8 mg qd for COPD   dental called today: pt removed own dentures 2 days ago and now c/o rt upper jaw/ mouth pain   rt elbow vac changed today  abx as per ID- pt will need chronic suppression as per ID; bactrim and diflucan; IV vanco d/c 11/16 as per ID   pulm toilet  pain management  increase activity as tolerated  bowel regimen  discharge planning- rehab when stable

## 2022-11-18 NOTE — PROGRESS NOTE ADULT - SUBJECTIVE AND OBJECTIVE BOX
Patient is a 72y old  Female who presents with a chief complaint of Type A aortic dissection (18 Nov 2022 14:16)       INTERVAL HPI/OVERNIGHT EVENTS:  Patient seen and evaluated at bedside.  Pt is resting comfortable in NAD.     Allergies    aspirin (Short breath)  Avelox (Short breath; Pruritus)  cefepime (Anaphylaxis)  codeine (Short breath)  Dilaudid (Short breath)  iodine (Short breath; Swelling)  penicillin (Anaphylaxis)  shellfish (Anaphylaxis)  tetanus toxoid (Short breath)  Valium (Short breath)    Intolerances        Vital Signs Last 24 Hrs  T(C): 37 (18 Nov 2022 20:37), Max: 37 (18 Nov 2022 20:37)  T(F): 98.6 (18 Nov 2022 20:37), Max: 98.6 (18 Nov 2022 20:37)  HR: 74 (18 Nov 2022 20:37) (74 - 85)  BP: 115/76 (18 Nov 2022 20:37) (99/68 - 137/77)  BP(mean): 99 (17 Nov 2022 21:50) (99 - 99)  RR: 18 (18 Nov 2022 20:37) (18 - 18)  SpO2: 97% (18 Nov 2022 20:37) (96% - 98%)    Parameters below as of 18 Nov 2022 20:37  Patient On (Oxygen Delivery Method): room air        LABS:                        10.0   15.27 )-----------( 280      ( 17 Nov 2022 10:09 )             33.4     11-17    135  |  99  |  14  ----------------------------<  196<H>  4.4   |  25  |  0.47<L>    Ca    9.2      17 Nov 2022 10:09          CAPILLARY BLOOD GLUCOSE      POCT Blood Glucose.: 248 mg/dL (18 Nov 2022 21:25)  POCT Blood Glucose.: 184 mg/dL (18 Nov 2022 16:43)  POCT Blood Glucose.: 266 mg/dL (18 Nov 2022 11:38)  POCT Blood Glucose.: 211 mg/dL (18 Nov 2022 07:37)  POCT Blood Glucose.: 133 mg/dL (18 Nov 2022 03:04)      Lower Extremity Physical Exam:  Vasular: DP/PT 1_/4, B/L, CFT <_2 seconds B/L, Temperature gradient _WNL, B/L.   Neuro: Epicritic sensation _diminished to the level of toes_, B/L.  Skin: left heel DTI/fat pad atrophy.  0.5 cm in diameter.  No open skin lesions.  No fluctuance or clinical signs of infection left lower extremity  Wound #1:   Location: right medial midfoot.  Size: 0.7 cm in diameter  Depth: 0.2 cm in depth  Wound bed: red granular tissue  Drainage: nonfluctuant/none  Odor: none  Periwound:no clinical signs of infection  Etiology: present on admission

## 2022-11-18 NOTE — PROGRESS NOTE ADULT - ASSESSMENT
71 year-old female with a history of DM2, CAD, A-Fib on apixaban, Severe Persistent Asthma (on chronic steroids, recently started on Tezspire), colon cancer s/p resection/chemo, and tracheobronchomalacia s/p tracheoplasty, and recent OM of the R foot s/b debridement and completed course of Vancomycin/Ertapenem for MRSA/ESBL Proteus/Corynebacterium who now presents with chest pain, found to have Type A dissection s/p repair with modified Bentall procedure and hemiarch replacement on 22. Post-op course complicated by acute hypoxemic respiratory failure, shock, anemia, and hyperglycemia requiring insulin gtt. Extubated , now awake and alert with mild encephalopathy but overall significantly improved.    Assessment:  Acute hypoxemic respiratory failure requiring intubation  Type A Aortic Dissection  Severe Persistent Asthma  History of Tracheobronchomalacia    Plan:  See below:  -**************************                                SEE Below:  -improving but weakness  9/15-ABX adjusted to ceftriaxone (LFTS)-PICU  -PICU, low grade temp-fever work up in progress, no resp decline or sx  -resp stable at present but tenuous  -for FEESST today, NGT in place w/TF  -s/p FEESST-passed, remains in PICU          -TF in place at 40cc, more OOB          -hypoglycemia noted and rx, no change in resp status  -vented/sedated-pressors/inotropes/ABX  -remains vented on nitrous/less O2 needs, improving LFTS                   -vented/semi sedated--less O2 needs  10/3-on vanco, weaning sedation/, all lines changed  10/6-no changes-now afebrile-on vanco   10/7-no weaning-remains off pressors  10/10-for trach, no weaning done               10/11-s/p trach-uneventful  10/12-TC and ;cpap done and tolerated well  10/13-weaning TC continues              10/14-mucus issues-TC continues  10/24-TC x mult days-stable-secretion issues remain           10/25-replaced on vent for secretions            10/26-TC in place, mult consults  10/27-no changes-TC continues    10/28-wound care-vac in place  10/31-TC in place, elbow wound vac in place  -resp stable, elbow vac changed--ABX as per ID     -no new issues over the day  11/3-no new events-on TC/OOB as able; ENT f/up for awake laryngoscopy  -ENT f/up, vest use-feels well                                  -no new events--await decannulation   -phonating well, wound vac right arm in place  -awaiting floor bed-board CICU                    11/10-CTU-vanco until -no new events                     -continued ambulation, TC remains  -stable over the day  -decannulated and phonating well, wants rehab  11/15-VSS no acute issues overnight, await swallow eval   VSS; RSR 70-80; npo w/ koafeed tf's; mbs today; diet advanced to puree diet w/ mild thick liquids; noctural tf; insulin adjusted as per H. endo as per diet changes; dental consult- pt removed own dentures 2 days ago and now c/o rt upper jaw/ mouth pain; rt elbow vac changed today; abx as per ID- pt will need chronic suppression as per ID; bactrim and diflucan; ? d/c IV vanco today d/w ID    VSS - pt tolerating puree diet w/ moderately thickened liquids  will d/c nocturnal feeds & kaofeed.  discharge planing- rehab Monday w/ right elbow vac - will ck Covid sun.    71 year-old female with a history of DM2, CAD, A-Fib on apixaban, Severe Persistent Asthma (on chronic steroids, recently started on Tezspire), colon cancer s/p resection/chemo, and tracheobronchomalacia s/p tracheoplasty, and recent OM of the R foot s/b debridement and completed course of Vancomycin/Ertapenem for MRSA/ESBL Proteus/Corynebacterium who now presents with chest pain, found to have Type A dissection s/p repair with modified Bentall procedure and hemiarch replacement on 22. Post-op course complicated by acute hypoxemic respiratory failure, shock, anemia, and hyperglycemia requiring insulin gtt. Extubated , now awake and alert with mild encephalopathy but overall significantly improved.    Assessment:  Acute hypoxemic respiratory failure requiring intubation  Type A Aortic Dissection  Severe Persistent Asthma  History of Tracheobronchomalacia    Plan:  See below:  -**************************                                SEE Below:  -improving but weakness  9/15-ABX adjusted to ceftriaxone (LFTS)-PICU  -PICU, low grade temp-fever work up in progress, no resp decline or sx  -resp stable at present but tenuous  -for FEESST today, NGT in place w/TF  -s/p FEESST-passed, remains in PICU          -TF in place at 40cc, more OOB          -hypoglycemia noted and rx, no change in resp status  -vented/sedated-pressors/inotropes/ABX  -remains vented on nitrous/less O2 needs, improving LFTS                   -vented/semi sedated--less O2 needs  10/3-on vanco, weaning sedation/, all lines changed  10/6-no changes-now afebrile-on vanco   10/7-no weaning-remains off pressors  10/10-for trach, no weaning done               10/11-s/p trach-uneventful  10/12-TC and ;cpap done and tolerated well  10/13-weaning TC continues              10/14-mucus issues-TC continues  10/24-TC x mult days-stable-secretion issues remain           10/25-replaced on vent for secretions            10/26-TC in place, mult consults  10/27-no changes-TC continues    10/28-wound care-vac in place  10/31-TC in place, elbow wound vac in place  -resp stable, elbow vac changed--ABX as per ID     -no new issues over the day  11/3-no new events-on TC/OOB as able; ENT f/up for awake laryngoscopy  -ENT f/up, vest use-feels well                                  -no new events--await decannulation   -phonating well, wound vac right arm in place  -awaiting floor bed-board CICU                    11/10-CTU-vanco until -no new events                     -continued ambulation, TC remains  -stable over the day  -decannulated and phonating well, wants rehab  11/15-VSS no acute issues overnight, await swallow eval   VSS; RSR 70-80; npo w/ koafeed tf's; mbs today; diet advanced to puree diet w/ mild thick liquids; noctural tf; insulin adjusted as per H. endo as per diet changes; dental consult- pt removed own dentures 2 days ago and now c/o rt upper jaw/ mouth pain-->Traumatic ulcer due to denture prosthesis- salt water rinses recommended;   rt elbow vac changed today; abx as per ID- pt will need chronic suppression as per ID; bactrim and diflucan; d/c IV vanco today d/w ID    VSS - pt tolerating puree diet w/ moderately thickened liquids  will d/c nocturnal feeds & kaofeed   VSS; rsr 70-90; rt elbow vac change today; tolerating puree diet; ck covid pcr sun; rehab placement mon/ e next week

## 2022-11-18 NOTE — PROGRESS NOTE ADULT - SUBJECTIVE AND OBJECTIVE BOX
DIABETES FOLLOW UP NOTE: Saw pt earlier today    Chief Complaint: Endocrine consult requested for management of T2DM    INTERVAL HX: Pt stable, reports tolerating POs and eating > 50% of her meals. BG levels going up while on correction scales. No hypoglycemia. Remains on chronic Prednisone 8mg for AI. On antibiotics for sepsis. Per pt, dentist saw her for her mouth complain and she is having salt gargles for a mouth ulcer> pain improved.       Review of Systems:  General: As above  Cardiovascular: No chest pain, palpitations  Respiratory: No SOB, no cough  GI: No nausea, vomiting, abdominal pain  Endocrine: No polyuria, polydipsia or S&Sx of hypoglycemia    Allergies    aspirin (Short breath)  Avelox (Short breath; Pruritus)  cefepime (Anaphylaxis)  codeine (Short breath)  Dilaudid (Short breath)  iodine (Short breath; Swelling)  penicillin (Anaphylaxis)  shellfish (Anaphylaxis)  tetanus toxoid (Short breath)  Valium (Short breath)    Intolerances      MEDICATIONS:  fluconAZOLE   Tablet 400 milliGRAM(s) Oral daily  insulin lispro (ADMELOG) corrective regimen sliding scale   SubCutaneous three times a day before meals  insulin lispro (ADMELOG) corrective regimen sliding scale   SubCutaneous <User Schedule>  insulin NPH human recombinant 3 Unit(s) SubCutaneous <User Schedule>  methylPREDNISolone 8 milliGRAM(s) Oral daily  trimethoprim   80 mG/sulfamethoxazole 400 mG 1 Tablet(s) Oral daily      PHYSICAL EXAM:  VITALS: T(C): 36.8 (11-18-22 @ 04:45)  T(F): 98.3 (11-18-22 @ 04:45), Max: 98.7 (11-17-22 @ 19:37)  HR: 81 (11-18-22 @ 14:18) (79 - 86)  BP: 99/68 (11-18-22 @ 14:18) (99/68 - 137/77)  RR:  (18 - 18)  SpO2:  (96% - 98%)  Wt(kg): --  GENERAL: Female laying in bed in NAD.   HEENT: Small dressing noted in former trach site D&I,   Chest: Sternal incision healed   Abdomen: Soft, nontender, non distended  Extremities: Warm, no edema in all 4 exts. RUE wound to vac with whitish foamy out put.   NEURO: Alert, able to answer simple questions      LABS:  POCT Blood Glucose.: 266 mg/dL (11-18-22 @ 11:38)  POCT Blood Glucose.: 211 mg/dL (11-18-22 @ 07:37)  POCT Blood Glucose.: 133 mg/dL (11-18-22 @ 03:04)  POCT Blood Glucose.: 182 mg/dL (11-17-22 @ 21:24)  POCT Blood Glucose.: 250 mg/dL (11-17-22 @ 16:54)  POCT Blood Glucose.: 179 mg/dL (11-17-22 @ 11:40)  POCT Blood Glucose.: 178 mg/dL (11-17-22 @ 10:35)  POCT Blood Glucose.: 134 mg/dL (11-17-22 @ 07:56)  POCT Blood Glucose.: 106 mg/dL (11-17-22 @ 03:10)  POCT Blood Glucose.: 98 mg/dL (11-16-22 @ 21:22)  POCT Blood Glucose.: 89 mg/dL (11-16-22 @ 17:30)  POCT Blood Glucose.: 78 mg/dL (11-16-22 @ 16:48)  POCT Blood Glucose.: 80 mg/dL (11-16-22 @ 16:18)  POCT Blood Glucose.: 56 mg/dL (11-16-22 @ 15:40)  POCT Blood Glucose.: 98 mg/dL (11-16-22 @ 12:57)  POCT Blood Glucose.: 209 mg/dL (11-16-22 @ 06:23)  POCT Blood Glucose.: 116 mg/dL (11-16-22 @ 00:36)  POCT Blood Glucose.: 200 mg/dL (11-15-22 @ 16:42)                            10.0   15.27 )-----------( 280      ( 17 Nov 2022 10:09 )             33.4       11-17    135  |  99  |  14  ----------------------------<  196<H>  4.4   |  25  |  0.47<L>    eGFR: 100    Ca    9.2      11-17      A1C with Estimated Average Glucose Result: 9.4 % (09-06-22 @ 16:46)      Estimated Average Glucose: 223 mg/dL (09-06-22 @ 16:46)

## 2022-11-18 NOTE — PROGRESS NOTE ADULT - ASSESSMENT
assessment/plan:    Chronic ulceration right medial midfoot: stable, noninfected, present on admission.  Left heel DTI: Noninfected, present remission.  Diabetes mellitus    Recommend normal saline cleanse with mupirocin ointment and DSD to the right foot ulceration daily.  Recommend continue decubitus precautions and use of Z flow boots.  Recommend cavilon  every other day to left heel.  We will continue to monitor for signs of infection and wound care recommendations while in house.  Patient to follow-up with Dr. Love at [phone] for continued wound care management as an outpatient

## 2022-11-18 NOTE — PROGRESS NOTE ADULT - SUBJECTIVE AND OBJECTIVE BOX
VITAL SIGNS    Telemetry:    Vital Signs Last 24 Hrs  T(C): 36.8 (22 @ 04:45), Max: 37.1 (22 @ 19:37)  T(F): 98.3 (22 @ 04:45), Max: 98.7 (22 @ 19:37)  HR: 84 (22 @ 04:45) (79 - 90)  BP: 125/62 (22 @ 04:45) (125/62 - 136/81)  RR: 18 (22 @ 04:45) (18 - 18)  SpO2: 96% (22 @ 04:45) (93% - 98%)             @ 07:01  -   @ 07:00  --------------------------------------------------------  IN: 300 mL / OUT: 800 mL / NET: -500 mL     @ 07:01  -   @ 11:01  --------------------------------------------------------  IN: 240 mL / OUT: 400 mL / NET: -160 mL       Daily     Daily Weight in k.3 (2022 10:40)  Admit Wt: Drug Dosing Weight  Height (cm): 170.2 (10 Oct 2022 13:39)  Weight (kg): 67 (10 Oct 2022 13:39)  BMI (kg/m2): 23.1 (10 Oct 2022 13:39)  BSA (m2): 1.78 (10 Oct 2022 13:39)      CAPILLARY BLOOD GLUCOSE      POCT Blood Glucose.: 211 mg/dL (2022 07:37)  POCT Blood Glucose.: 133 mg/dL (2022 03:04)  POCT Blood Glucose.: 182 mg/dL (2022 21:24)  POCT Blood Glucose.: 250 mg/dL (2022 16:54)  POCT Blood Glucose.: 179 mg/dL (2022 11:40)          acetaminophen    Suspension .. 650 milliGRAM(s) Oral every 6 hours PRN  acetylcysteine 10%  Inhalation 4 milliLiter(s) Inhalation every 12 hours  albuterol/ipratropium for Nebulization 3 milliLiter(s) Nebulizer every 6 hours  apixaban 5 milliGRAM(s) Enteral Tube every 12 hours  ascorbic acid 500 milliGRAM(s) Oral daily  buDESOnide    Inhalation Suspension 0.5 milliGRAM(s) Inhalation every 12 hours  calamine/zinc oxide Lotion 1 Application(s) Topical every 8 hours PRN  chlorhexidine 2% Cloths 1 Application(s) Topical <User Schedule>  collagenase Ointment 1 Application(s) Topical daily  dextrose 50% Injectable 25 Gram(s) IV Push once  dextrose 50% Injectable 12.5 Gram(s) IV Push once  dextrose 50% Injectable 25 Gram(s) IV Push once  dextrose Oral Gel 15 Gram(s) Oral once  diphenhydramine 2%/zinc acetate 0.1% Cream 1 Application(s) Topical every 8 hours  fluconAZOLE   Tablet 400 milliGRAM(s) Oral daily  glucagon  Injectable 1 milliGRAM(s) IntraMuscular once  hydrALAZINE 50 milliGRAM(s) Oral three times a day  influenza  Vaccine (FLUBLOK) 0.5 milliLiter(s) IntraMuscular once  insulin lispro (ADMELOG) corrective regimen sliding scale   SubCutaneous three times a day before meals  insulin lispro (ADMELOG) corrective regimen sliding scale   SubCutaneous <User Schedule>  insulin NPH human recombinant 3 Unit(s) SubCutaneous <User Schedule>  magnesium oxide 400 milliGRAM(s) Oral every 8 hours  methylPREDNISolone 8 milliGRAM(s) Oral daily  metoclopramide Injectable 5 milliGRAM(s) IV Push every 8 hours  metoprolol tartrate 12.5 milliGRAM(s) Oral two times a day  mexiletine 200 milliGRAM(s) Oral every 8 hours  multivitamin 1 Tablet(s) Oral daily  nystatin Powder 1 Application(s) Topical two times a day  pantoprazole  Injectable 40 milliGRAM(s) IV Push daily  sodium chloride 0.9% lock flush 3 milliLiter(s) IV Push every 8 hours  trimethoprim   80 mG/sulfamethoxazole 400 mG 1 Tablet(s) Oral daily      PHYSICAL EXAM    Subjective: "Hi.   Neurology: alert and oriented x 3, nonfocal, no gross deficits  CV : tele:  RSR  Sternal Wound :  CDI with dressing , Stable  Lungs: clear. RR easy, unlabored   Abdomen: soft, nontender, nondistended, positive bowel sounds, bowel movement   Neg N/V/D   :  pt voiding without difficulty   Extremities:   POOLE; edema, neg calf tenderness.   PPP bilaterally      PW:  Chest tubes:                 VITAL SIGNS    Telemetry:  rsr 70-90  Vital Signs Last 24 Hrs  T(C): 36.8 (22 @ 04:45), Max: 37.1 (22 @ 19:37)  T(F): 98.3 (22 @ 04:45), Max: 98.7 (22 @ 19:37)  HR: 84 (22 @ 04:45) (79 - 90)  BP: 125/62 (22 @ 04:45) (125/62 - 136/81)  RR: 18 (22 @ 04:45) (18 - 18)  SpO2: 96% (22 @ 04:45) (93% - 98%)             @ 07:01  -   @ 07:00  --------------------------------------------------------  IN: 300 mL / OUT: 800 mL / NET: -500 mL     @ 07:01  -   @ 11:01  --------------------------------------------------------  IN: 240 mL / OUT: 400 mL / NET: -160 mL       Daily     Daily Weight in k.3 (2022 10:40)  Admit Wt: Drug Dosing Weight  Height (cm): 170.2 (10 Oct 2022 13:39)  Weight (kg): 67 (10 Oct 2022 13:39)  BMI (kg/m2): 23.1 (10 Oct 2022 13:39)  BSA (m2): 1.78 (10 Oct 2022 13:39)      CAPILLARY BLOOD GLUCOSE      POCT Blood Glucose.: 211 mg/dL (2022 07:37)  POCT Blood Glucose.: 133 mg/dL (2022 03:04)  POCT Blood Glucose.: 182 mg/dL (2022 21:24)  POCT Blood Glucose.: 250 mg/dL (2022 16:54)  POCT Blood Glucose.: 179 mg/dL (2022 11:40)          acetaminophen    Suspension .. 650 milliGRAM(s) Oral every 6 hours PRN  acetylcysteine 10%  Inhalation 4 milliLiter(s) Inhalation every 12 hours  albuterol/ipratropium for Nebulization 3 milliLiter(s) Nebulizer every 6 hours  apixaban 5 milliGRAM(s) Enteral Tube every 12 hours  ascorbic acid 500 milliGRAM(s) Oral daily  buDESOnide    Inhalation Suspension 0.5 milliGRAM(s) Inhalation every 12 hours  calamine/zinc oxide Lotion 1 Application(s) Topical every 8 hours PRN  chlorhexidine 2% Cloths 1 Application(s) Topical <User Schedule>  collagenase Ointment 1 Application(s) Topical daily  dextrose 50% Injectable 25 Gram(s) IV Push once  dextrose 50% Injectable 12.5 Gram(s) IV Push once  dextrose 50% Injectable 25 Gram(s) IV Push once  dextrose Oral Gel 15 Gram(s) Oral once  diphenhydramine 2%/zinc acetate 0.1% Cream 1 Application(s) Topical every 8 hours  fluconAZOLE   Tablet 400 milliGRAM(s) Oral daily  glucagon  Injectable 1 milliGRAM(s) IntraMuscular once  hydrALAZINE 50 milliGRAM(s) Oral three times a day  influenza  Vaccine (FLUBLOK) 0.5 milliLiter(s) IntraMuscular once  insulin lispro (ADMELOG) corrective regimen sliding scale   SubCutaneous three times a day before meals  insulin lispro (ADMELOG) corrective regimen sliding scale   SubCutaneous <User Schedule>  insulin NPH human recombinant 3 Unit(s) SubCutaneous <User Schedule>  magnesium oxide 400 milliGRAM(s) Oral every 8 hours  methylPREDNISolone 8 milliGRAM(s) Oral daily  metoclopramide Injectable 5 milliGRAM(s) IV Push every 8 hours  metoprolol tartrate 12.5 milliGRAM(s) Oral two times a day  mexiletine 200 milliGRAM(s) Oral every 8 hours  multivitamin 1 Tablet(s) Oral daily  nystatin Powder 1 Application(s) Topical two times a day  pantoprazole  Injectable 40 milliGRAM(s) IV Push daily  sodium chloride 0.9% lock flush 3 milliLiter(s) IV Push every 8 hours  trimethoprim   80 mG/sulfamethoxazole 400 mG 1 Tablet(s) Oral daily      PHYSICAL EXAM    Subjective: "I feel ok."  Neurology: alert and oriented x 3, nonfocal, no gross deficits  CV : tele:  RSR 70-90  Sternal Wound :  CDI BARRETT- healing well   Lungs: + bilateral rhonchi;  RR easy, unlabored; former trach site cdi w/ dressing   Abdomen: soft, nontender, nondistended, + bs; + colostomy- stoma intact; Neg N/V/D  :  pt voiding without difficulty   Extremities:   POOLE; neg LE edema, neg calf tenderness.   PPP bilaterally; rt foot dressing cdi; rt elbow vac intact       PW: no  Chest tubes: none

## 2022-11-18 NOTE — PROGRESS NOTE ADULT - SUBJECTIVE AND OBJECTIVE BOX
CHIEF COMPLAINT:  f/up sob, chronic resp failure, TBM, severe persistent asthma, VC dysfunction, Type A aortic dissection s/p repair w/modified "Bentall procedure and hemiarch replacement 9/6- decannulated-on RA, right shoulder pain upon movement, good breathing and now eating    Interval Events: NGT out    REVIEW OF SYSTEMS:  Constitutional: No fevers or chills. No weight loss. No fatigue or generalized malaise.  Eyes: No itching or discharge from the eyes  ENT: No ear pain. No ear discharge. No nasal congestion. No post nasal drip. No epistaxis. No throat pain. No sore throat. No difficulty swallowing.   CV: No chest pain. No palpitations. No lightheadedness or dizziness.   Resp: No dyspnea at rest. No dyspnea on exertion. No orthopnea. No wheezing. No cough. No stridor. No sputum production. No chest pain with respiration.  GI: No nausea. No vomiting. No diarrhea.  MSK: No joint pain or pain in any extremities  Integumentary: No skin lesions. No pedal edema.  Neurological: No gross motor weakness. No sensory changes.R shoulder issues  [+ ] All other systems negative  [ ] Unable to assess ROS because ________    OBJECTIVE:  ICU Vital Signs Last 24 Hrs  T(C): 37.1 (17 Nov 2022 19:37), Max: 37.1 (17 Nov 2022 19:37)  T(F): 98.7 (17 Nov 2022 19:37), Max: 98.7 (17 Nov 2022 19:37)  HR: 79 (17 Nov 2022 21:50) (79 - 90)  BP: 136/81 (17 Nov 2022 21:50) (102/67 - 136/81)  BP(mean): 99 (17 Nov 2022 21:50) (99 - 99)  ABP: --  ABP(mean): --  RR: 18 (17 Nov 2022 21:50) (18 - 18)  SpO2: 98% (17 Nov 2022 21:50) (93% - 98%)    O2 Parameters below as of 18 Nov 2022 00:32  Patient On (Oxygen Delivery Method): room air              11-16 @ 07:01  -  11-17 @ 07:00  --------------------------------------------------------  IN: 750 mL / OUT: 800 mL / NET: -50 mL    11-17 @ 07:01  -  11-18 @ 04:54  --------------------------------------------------------  IN: 240 mL / OUT: 600 mL / NET: -360 mL      CAPILLARY BLOOD GLUCOSE      POCT Blood Glucose.: 133 mg/dL (18 Nov 2022 03:04)      PHYSICAL EXAM: NAD in bed on RA  General: Awake, alert, oriented X 3.   HEENT: Atraumatic, normocephalic.                 Mallampatti Grade 3                No nasal congestion.                No tonsillar or pharyngeal exudates.  Lymph Nodes: No palpable lymphadenopathy  Neck: No JVD. No carotid bruit.   Respiratory: Normal chest expansion                         Normal percussion                         Normal and equal air entry                         No wheeze, rhonchi or rales.  Cardiovascular: S1 S2 normal. No murmurs, rubs or gallops.   Abdomen: Soft, non-tender, non-distended. No organomegaly. Normoactive bowel sounds.  Extremities: Warm to touch. Peripheral pulse palpable. No pedal edema. R-shoulder issues  Skin: No rashes or skin lesions  Neurological: Motor and sensory examination equal and normal in all four extremities.  Psychiatry: Appropriate mood and affect.    HOSPITAL MEDICATIONS:  MEDICATIONS  (STANDING):  acetylcysteine 10%  Inhalation 4 milliLiter(s) Inhalation every 12 hours  albuterol/ipratropium for Nebulization 3 milliLiter(s) Nebulizer every 6 hours  apixaban 5 milliGRAM(s) Enteral Tube every 12 hours  ascorbic acid 500 milliGRAM(s) Oral daily  buDESOnide    Inhalation Suspension 0.5 milliGRAM(s) Inhalation every 12 hours  chlorhexidine 2% Cloths 1 Application(s) Topical <User Schedule>  collagenase Ointment 1 Application(s) Topical daily  dextrose 50% Injectable 25 Gram(s) IV Push once  dextrose 50% Injectable 12.5 Gram(s) IV Push once  dextrose 50% Injectable 25 Gram(s) IV Push once  dextrose Oral Gel 15 Gram(s) Oral once  diphenhydramine 2%/zinc acetate 0.1% Cream 1 Application(s) Topical every 8 hours  fluconAZOLE   Tablet 400 milliGRAM(s) Oral daily  glucagon  Injectable 1 milliGRAM(s) IntraMuscular once  hydrALAZINE 50 milliGRAM(s) Oral three times a day  influenza  Vaccine (FLUBLOK) 0.5 milliLiter(s) IntraMuscular once  insulin lispro (ADMELOG) corrective regimen sliding scale   SubCutaneous three times a day before meals  insulin lispro (ADMELOG) corrective regimen sliding scale   SubCutaneous <User Schedule>  insulin NPH human recombinant 3 Unit(s) SubCutaneous <User Schedule>  magnesium oxide 400 milliGRAM(s) Oral every 8 hours  methylPREDNISolone 8 milliGRAM(s) Oral daily  metoclopramide Injectable 5 milliGRAM(s) IV Push every 8 hours  metoprolol tartrate 12.5 milliGRAM(s) Oral two times a day  mexiletine 200 milliGRAM(s) Oral every 8 hours  multivitamin 1 Tablet(s) Oral daily  nystatin Powder 1 Application(s) Topical two times a day  pantoprazole  Injectable 40 milliGRAM(s) IV Push daily  sodium chloride 0.9% lock flush 3 milliLiter(s) IV Push every 8 hours  trimethoprim   80 mG/sulfamethoxazole 400 mG 1 Tablet(s) Oral daily    MEDICATIONS  (PRN):  acetaminophen    Suspension .. 650 milliGRAM(s) Oral every 6 hours PRN Temp greater or equal to 38C (100.4F), Mild Pain (1 - 3)  calamine/zinc oxide Lotion 1 Application(s) Topical every 8 hours PRN Itching      LABS:                        10.0   15.27 )-----------( 280      ( 17 Nov 2022 10:09 )             33.4     11-17    135  |  99  |  14  ----------------------------<  196<H>  4.4   |  25  |  0.47<L>    Ca    9.2      17 Nov 2022 10:09                MICROBIOLOGY:     RADIOLOGY:  [ ] Reviewed and interpreted by me    Point of Care Ultrasound Findings:    PFT:    EKG:

## 2022-11-18 NOTE — PROGRESS NOTE ADULT - ASSESSMENT
72 F w/h/o uncontrolled T2DM (A1C 9.4%) on basal/bolus insulin PTA. Unknown DM complications. Also COPD, secondary adrenal Insufficiency on chronic steroids, colorectal cancer s/p resection (colostomy bag), Chronic A fib on Eliquis, and tracheomalacia and multiple intubations, recent dx of OM presented to ED at [facility] with epigastric pain, belching and central chest pain. Found on CTA to have type A aortic dissection and transferred to [facility] for surgical evaluation by Dr. Cabrera. Now s/p aortic dissection repair on 9/07/22, sepsis, and now s/p trach 10/10 and more recently s/p R elbow eschar debridement 10/22 > Wound VAC 10/24. Endocrine consulted for assistance with uncontrolled DM/steroids for AI. BG levels going up as PO improves. Will add premeal insulin as pt reports eating > 50% of her meals. No need for basal insulin yet since FBG today was done after pt received prednisone and her BG at 3am was 133 per POC. BG goal 100 to 180s without hypoglycemia

## 2022-11-18 NOTE — PROGRESS NOTE ADULT - NSPROGADDITIONALINFOA_GEN_ALL_CORE
-Plan discussed with pt/team. Spoke to CTU team to f/u pt c/o R mouth/ear pain.   Contact info: [phone] (24/7). pager [phone]  [email or web address] password [email or web address]  Teams  Spent 30 minutes assessing pt/labs/meds and discussing plan of care with primary team  Adjusting insulin  Discharge plan  Follow up care

## 2022-11-18 NOTE — PROGRESS NOTE ADULT - PROBLEM SELECTOR PLAN 2
SABINO. Qasim following   continue admelog SS   11/17b puree diet w/ mod thick liq  will d/c kaofeed & nocturnal feeds  continue NPH as per endo   accuchecks q6 hours SABINO. Qasim following   continue admelog SS   11/17 puree diet w/ mod thick liq  nph and admelog as per endo   accuchecks

## 2022-11-18 NOTE — PROGRESS NOTE ADULT - NUTRITIONAL ASSESSMENT
This patient has been assessed with a concern for Malnutrition and has been determined to have a diagnosis/diagnoses of Moderate protein-calorie malnutrition.    This patient is being managed with:   Diet Pureed-  Mildly Thick Liquids (MILDTHICKLIQS)  Liquid via Teaspoon Only No Straws  Entered: Nov 17 2022  5:44PM

## 2022-11-18 NOTE — PROGRESS NOTE ADULT - ASSESSMENT
71 year-old female with a history of DM2, CAD, A-Fib on apixaban, Severe Persistent Asthma (on chronic steroids, recently started on Tezspire), colon cancer s/p resection/chemo, and tracheobronchomalacia s/p tracheoplasty, and recent OM of the R foot s/b debridement and completed course of Vancomycin/Ertapenem for MRSA/ESBL Proteus/Corynebacterium who now presents with chest pain, found to have Type A dissection s/p repair with modified Bentall procedure and hemiarch replacement on 22. Post-op course complicated by acute hypoxemic respiratory failure, shock, anemia, and hyperglycemia requiring insulin gtt. Extubated -trached/decannulated.    Assessment:  Acute hypoxemic respiratory failure requiring intubation  Type A Aortic Dissection  Severe Persistent Asthma  History of Tracheobronchomalacia    Plan:  See below:  -**************************                                SEE Below:  -improving but weakness  9/15-ABX adjusted to ceftriaxone (LFTS)-PICU  -PICU, low grade temp-fever work up in progress, no resp decline or sx  -resp stable at present but tenuous  -for FEESST today, NGT in place w/TF  -s/p FEESST-passed, remains in PICU          -TF in place at 40cc, more OOB          -hypoglycemia noted and rx, no change in resp status  -vented/sedated-pressors/inotropes/ABX  -remains vented on nitrous/less O2 needs, improving LFTS                   -vented/semi sedated--less O2 needs  10/3-on vanco, weaning sedation/, all lines changed  10/6-no changes-now afebrile-on vanco   10/7-no weaning-remains off pressors  10/10-for trach, no weaning done               10/11-s/p trach-uneventful  10/12-TC and ;cpap done and tolerated well  10/13-weaning TC continues              10/14-mucus issues-TC continues  10/24-TC x mult days-stable-secretion issues remain           10/25-replaced on vent for secretions            10/26-TC in place, mult consults  10/27-no changes-TC continues    10/28-wound care-vac in place  10/31-TC in place, elbow wound vac in place  -resp stable, elbow vac changed--ABX as per ID     -no new issues over the day  11/3-no new events-on TC/OOB as able; ENT f/up for awake laryngoscopy  -ENT f/up, vest use-feels well                                  -no new events--await decannulation   -phonating well, wound vac right arm in place  -awaiting floor bed-board CICU                    11/10-CTU-vanco until -no new events                     -continued ambulation, TC remains  -stable over the day  -decannulated and phonating well, wants rehab  11/15-tx floor, await swallow evaln  -swallow evaln in progress    -MS pain right shoulder                 -NGT removed and eating well

## 2022-11-19 LAB
ANION GAP SERPL CALC-SCNC: 14 MMOL/L — SIGNIFICANT CHANGE UP (ref 5–17)
BUN SERPL-MCNC: 17 MG/DL — SIGNIFICANT CHANGE UP (ref 7–23)
CALCIUM SERPL-MCNC: 9.4 MG/DL — SIGNIFICANT CHANGE UP (ref 8.4–10.5)
CHLORIDE SERPL-SCNC: 98 MMOL/L — SIGNIFICANT CHANGE UP (ref 96–108)
CO2 SERPL-SCNC: 20 MMOL/L — LOW (ref 22–31)
CREAT SERPL-MCNC: 0.54 MG/DL — SIGNIFICANT CHANGE UP (ref 0.5–1.3)
EGFR: 97 ML/MIN/1.73M2 — SIGNIFICANT CHANGE UP
GLUCOSE BLDC GLUCOMTR-MCNC: 110 MG/DL — HIGH (ref 70–99)
GLUCOSE BLDC GLUCOMTR-MCNC: 162 MG/DL — HIGH (ref 70–99)
GLUCOSE BLDC GLUCOMTR-MCNC: 238 MG/DL — HIGH (ref 70–99)
GLUCOSE BLDC GLUCOMTR-MCNC: 262 MG/DL — HIGH (ref 70–99)
GLUCOSE BLDC GLUCOMTR-MCNC: 360 MG/DL — HIGH (ref 70–99)
GLUCOSE SERPL-MCNC: 374 MG/DL — HIGH (ref 70–99)
HCT VFR BLD CALC: 37.8 % — SIGNIFICANT CHANGE UP (ref 34.5–45)
HGB BLD-MCNC: 10.8 G/DL — LOW (ref 11.5–15.5)
MAGNESIUM SERPL-MCNC: 1.8 MG/DL — SIGNIFICANT CHANGE UP (ref 1.6–2.6)
MCHC RBC-ENTMCNC: 22.9 PG — LOW (ref 27–34)
MCHC RBC-ENTMCNC: 28.6 GM/DL — LOW (ref 32–36)
MCV RBC AUTO: 80.1 FL — SIGNIFICANT CHANGE UP (ref 80–100)
NRBC # BLD: 0 /100 WBCS — SIGNIFICANT CHANGE UP (ref 0–0)
PHOSPHATE SERPL-MCNC: 3.3 MG/DL — SIGNIFICANT CHANGE UP (ref 2.5–4.5)
PLATELET # BLD AUTO: 281 K/UL — SIGNIFICANT CHANGE UP (ref 150–400)
POTASSIUM SERPL-MCNC: 5 MMOL/L — SIGNIFICANT CHANGE UP (ref 3.5–5.3)
POTASSIUM SERPL-SCNC: 5 MMOL/L — SIGNIFICANT CHANGE UP (ref 3.5–5.3)
RBC # BLD: 4.72 M/UL — SIGNIFICANT CHANGE UP (ref 3.8–5.2)
RBC # FLD: 20.1 % — HIGH (ref 10.3–14.5)
SODIUM SERPL-SCNC: 132 MMOL/L — LOW (ref 135–145)
WBC # BLD: 9.47 K/UL — SIGNIFICANT CHANGE UP (ref 3.8–10.5)
WBC # FLD AUTO: 9.47 K/UL — SIGNIFICANT CHANGE UP (ref 3.8–10.5)

## 2022-11-19 RX ORDER — INSULIN LISPRO 100/ML
4 VIAL (ML) SUBCUTANEOUS
Refills: 0 | Status: DISCONTINUED | OUTPATIENT
Start: 2022-11-19 | End: 2022-11-20

## 2022-11-19 RX ADMIN — Medication 3 MILLILITER(S): at 12:24

## 2022-11-19 RX ADMIN — SODIUM CHLORIDE 3 MILLILITER(S): 9 INJECTION INTRAMUSCULAR; INTRAVENOUS; SUBCUTANEOUS at 22:22

## 2022-11-19 RX ADMIN — MEXILETINE HYDROCHLORIDE 200 MILLIGRAM(S): 150 CAPSULE ORAL at 14:05

## 2022-11-19 RX ADMIN — APIXABAN 5 MILLIGRAM(S): 2.5 TABLET, FILM COATED ORAL at 18:11

## 2022-11-19 RX ADMIN — Medication 3 MILLILITER(S): at 00:24

## 2022-11-19 RX ADMIN — Medication 2 UNIT(S): at 08:29

## 2022-11-19 RX ADMIN — Medication 3 MILLILITER(S): at 18:11

## 2022-11-19 RX ADMIN — Medication 4 UNIT(S): at 17:20

## 2022-11-19 RX ADMIN — Medication 50 MILLIGRAM(S): at 21:29

## 2022-11-19 RX ADMIN — Medication 5 MILLIGRAM(S): at 21:29

## 2022-11-19 RX ADMIN — NYSTATIN CREAM 1 APPLICATION(S): 100000 CREAM TOPICAL at 05:10

## 2022-11-19 RX ADMIN — Medication 3: at 17:19

## 2022-11-19 RX ADMIN — MEXILETINE HYDROCHLORIDE 200 MILLIGRAM(S): 150 CAPSULE ORAL at 21:28

## 2022-11-19 RX ADMIN — MEXILETINE HYDROCHLORIDE 200 MILLIGRAM(S): 150 CAPSULE ORAL at 05:10

## 2022-11-19 RX ADMIN — FLUCONAZOLE 400 MILLIGRAM(S): 150 TABLET ORAL at 14:05

## 2022-11-19 RX ADMIN — Medication 12.5 MILLIGRAM(S): at 05:09

## 2022-11-19 RX ADMIN — MAGNESIUM OXIDE 400 MG ORAL TABLET 400 MILLIGRAM(S): 241.3 TABLET ORAL at 05:09

## 2022-11-19 RX ADMIN — Medication 500 MILLIGRAM(S): at 14:07

## 2022-11-19 RX ADMIN — MAGNESIUM OXIDE 400 MG ORAL TABLET 400 MILLIGRAM(S): 241.3 TABLET ORAL at 21:28

## 2022-11-19 RX ADMIN — Medication 0.5 MILLIGRAM(S): at 09:25

## 2022-11-19 RX ADMIN — Medication 4 MILLILITER(S): at 18:11

## 2022-11-19 RX ADMIN — SODIUM CHLORIDE 3 MILLILITER(S): 9 INJECTION INTRAMUSCULAR; INTRAVENOUS; SUBCUTANEOUS at 05:38

## 2022-11-19 RX ADMIN — Medication 12.5 MILLIGRAM(S): at 18:11

## 2022-11-19 RX ADMIN — Medication 1 TABLET(S): at 14:06

## 2022-11-19 RX ADMIN — Medication 5 MILLIGRAM(S): at 13:28

## 2022-11-19 RX ADMIN — Medication 2: at 08:28

## 2022-11-19 RX ADMIN — Medication 5 MILLIGRAM(S): at 05:09

## 2022-11-19 RX ADMIN — MAGNESIUM OXIDE 400 MG ORAL TABLET 400 MILLIGRAM(S): 241.3 TABLET ORAL at 14:05

## 2022-11-19 RX ADMIN — APIXABAN 5 MILLIGRAM(S): 2.5 TABLET, FILM COATED ORAL at 05:08

## 2022-11-19 RX ADMIN — Medication 0.5 MILLIGRAM(S): at 18:12

## 2022-11-19 RX ADMIN — Medication 50 MILLIGRAM(S): at 05:09

## 2022-11-19 RX ADMIN — SODIUM CHLORIDE 3 MILLILITER(S): 9 INJECTION INTRAMUSCULAR; INTRAVENOUS; SUBCUTANEOUS at 13:27

## 2022-11-19 RX ADMIN — CHLORHEXIDINE GLUCONATE 1 APPLICATION(S): 213 SOLUTION TOPICAL at 05:38

## 2022-11-19 RX ADMIN — Medication 1 TABLET(S): at 14:08

## 2022-11-19 RX ADMIN — Medication 1 APPLICATION(S): at 13:26

## 2022-11-19 RX ADMIN — Medication 5: at 13:14

## 2022-11-19 RX ADMIN — Medication 8 MILLIGRAM(S): at 05:08

## 2022-11-19 RX ADMIN — Medication 2 UNIT(S): at 13:14

## 2022-11-19 RX ADMIN — Medication 4 MILLILITER(S): at 09:24

## 2022-11-19 RX ADMIN — PANTOPRAZOLE SODIUM 40 MILLIGRAM(S): 20 TABLET, DELAYED RELEASE ORAL at 15:19

## 2022-11-19 RX ADMIN — Medication 50 MILLIGRAM(S): at 14:06

## 2022-11-19 RX ADMIN — NYSTATIN CREAM 1 APPLICATION(S): 100000 CREAM TOPICAL at 17:47

## 2022-11-19 NOTE — PROGRESS NOTE ADULT - SUPERVISING ATTENDING
Dr. Cabrera
Dr. Cabrera
Dr Cabrera
Dr. Cabrera
Dr. Carbera
Tan Cabrera
Dr Cabrera
Dr. Cabrera
MD Cabrera
Dr. Cabrera
Dr. Cabrera
MD Cabrera
Tan Cabrera
Tan Cabrera
Dr Cabrera
Dr. Cabrera
MD Cabrera
MD Cabrera
Tan Cabrera
Dr. Cabrera
MD Cabrera
MD Cabrera
Dr. Cabrera
Dr. Cabrera
MD Cabrera
Dr. Cabrera

## 2022-11-19 NOTE — PROGRESS NOTE ADULT - ASSESSMENT
71 year-old female with a history of DM2, CAD, A-Fib on apixaban, Severe Persistent Asthma (on chronic steroids, recently started on Tezspire), colon cancer s/p resection/chemo, and tracheobronchomalacia s/p tracheoplasty, and recent OM of the R foot s/b debridement and completed course of Vancomycin/Ertapenem for MRSA/ESBL Proteus/Corynebacterium who now presents with chest pain, found to have Type A dissection s/p repair with modified Bentall procedure and hemiarch replacement on 22. Post-op course complicated by acute hypoxemic respiratory failure, shock, anemia, and hyperglycemia requiring insulin gtt. Extubated , now awake and alert with mild encephalopathy but overall significantly improved.    Assessment:  Acute hypoxemic respiratory failure requiring intubation  Type A Aortic Dissection  Severe Persistent Asthma  History of Tracheobronchomalacia    Plan:  See below:  -**************************                                SEE Below:  -improving but weakness  9/15-ABX adjusted to ceftriaxone (LFTS)-PICU  -PICU, low grade temp-fever work up in progress, no resp decline or sx  -resp stable at present but tenuous  -for FEESST today, NGT in place w/TF  -s/p FEESST-passed, remains in PICU          -TF in place at 40cc, more OOB          -hypoglycemia noted and rx, no change in resp status  -vented/sedated-pressors/inotropes/ABX  -remains vented on nitrous/less O2 needs, improving LFTS                   -vented/semi sedated--less O2 needs  10/3-on vanco, weaning sedation/, all lines changed  10/6-no changes-now afebrile-on vanco   10/7-no weaning-remains off pressors  10/10-for trach, no weaning done               10/11-s/p trach-uneventful  10/12-TC and ;cpap done and tolerated well  10/13-weaning TC continues              10/14-mucus issues-TC continues  10/24-TC x mult days-stable-secretion issues remain           10/25-replaced on vent for secretions            10/26-TC in place, mult consults  10/27-no changes-TC continues    10/28-wound care-vac in place  10/31-TC in place, elbow wound vac in place  -resp stable, elbow vac changed--ABX as per ID     -no new issues over the day  11/3-no new events-on TC/OOB as able; ENT f/up for awake laryngoscopy  -ENT f/up, vest use-feels well                                  -no new events--await decannulation   -phonating well, wound vac right arm in place  -awaiting floor bed-board CICU                    11/10-CTU-vanco until -no new events                     -continued ambulation, TC remains  -stable over the day  -decannulated and phonating well, wants rehab  11/15-VSS no acute issues overnight, await swallow eval   VSS; RSR 70-80; npo w/ koafeed tf's; mbs today; diet advanced to puree diet w/ mild thick liquids; noctural tf; insulin adjusted as per H. endo as per diet changes; dental consult- pt removed own dentures 2 days ago and now c/o rt upper jaw/ mouth pain-->Traumatic ulcer due to denture prosthesis- salt water rinses recommended;   rt elbow vac changed today; abx as per ID- pt will need chronic suppression as per ID; bactrim and diflucan; d/c IV vanco today d/w ID    VSS - pt tolerating puree diet w/ moderately thickened liquids  will d/c nocturnal feeds & kaofeed   VSS; rsr 70-90; rt elbow vac change today; tolerating puree diet; ck covid pcr sun; rehab placement mon/ e next week    71 year-old female with a history of DM2, CAD, A-Fib on apixaban, Severe Persistent Asthma (on chronic steroids, recently started on Tezspire), colon cancer s/p resection/chemo, and tracheobronchomalacia s/p tracheoplasty, and recent OM of the R foot s/b debridement and completed course of Vancomycin/Ertapenem for MRSA/ESBL Proteus/Corynebacterium who now presents with chest pain, found to have Type A dissection s/p repair with modified Bentall procedure and hemiarch replacement on 22. Post-op course complicated by acute hypoxemic respiratory failure, shock, anemia, and hyperglycemia requiring insulin gtt. Extubated , now awake and alert with mild encephalopathy but overall significantly improved.    Assessment:  Acute hypoxemic respiratory failure requiring intubation  Type A Aortic Dissection  Severe Persistent Asthma  History of Tracheobronchomalacia    Plan:  See below:  -**************************                                SEE Below:  -improving but weakness  9/15-ABX adjusted to ceftriaxone (LFTS)-PICU  -PICU, low grade temp-fever work up in progress, no resp decline or sx  -resp stable at present but tenuous  -for FEESST today, NGT in place w/TF  -s/p FEESST-passed, remains in PICU          -TF in place at 40cc, more OOB          -hypoglycemia noted and rx, no change in resp status  -vented/sedated-pressors/inotropes/ABX  -remains vented on nitrous/less O2 needs, improving LFTS                   -vented/semi sedated--less O2 needs  10/3-on vanco, weaning sedation/, all lines changed  10/6-no changes-now afebrile-on vanco   10/7-no weaning-remains off pressors  10/10-for trach, no weaning done               10/11-s/p trach-uneventful  10/12-TC and ;cpap done and tolerated well  10/13-weaning TC continues              10/14-mucus issues-TC continues  10/24-TC x mult days-stable-secretion issues remain           10/25-replaced on vent for secretions            10/26-TC in place, mult consults  10/27-no changes-TC continues    10/28-wound care-vac in place  10/31-TC in place, elbow wound vac in place  -resp stable, elbow vac changed--ABX as per ID     -no new issues over the day  11/3-no new events-on TC/OOB as able; ENT f/up for awake laryngoscopy  -ENT f/up, vest use-feels well                                  -no new events--await decannulation   -phonating well, wound vac right arm in place  -awaiting floor bed-board CICU                    11/10-CTU-vanco until -no new events                     -continued ambulation, TC remains  -stable over the day  -decannulated and phonating well, wants rehab  11/15-VSS no acute issues overnight, await swallow eval   VSS; RSR 70-80; npo w/ koafeed tf's; mbs today; diet advanced to puree diet w/ mild thick liquids; noctural tf; insulin adjusted as per H. endo as per diet changes; dental consult- pt removed own dentures 2 days ago and now c/o rt upper jaw/ mouth pain-->Traumatic ulcer due to denture prosthesis- salt water rinses recommended;   rt elbow vac changed today; abx as per ID- pt will need chronic suppression as per ID; bactrim and diflucan; d/c IV vanco today d/w ID    VSS - pt tolerating puree diet w/ moderately thickened liquids  will d/c nocturnal feeds & kaofeed   VSS; rsr 70-90; rt elbow vac change today; tolerating puree diet; ck covid pcr sun; rehab placement mon/ tue next week    VSS: RSR ;  tolerating puree diet; ck covid pcr sun; rehab placement mon/ tue next week

## 2022-11-19 NOTE — PROGRESS NOTE ADULT - SUBJECTIVE AND OBJECTIVE BOX
VITAL SIGNS    Telemetry:    Vital Signs Last 24 Hrs  T(C): 36.8 (11-19-22 @ 05:00), Max: 37 (11-18-22 @ 20:37)  T(F): 98.2 (11-19-22 @ 05:00), Max: 98.6 (11-18-22 @ 20:37)  HR: 80 (11-19-22 @ 05:00) (74 - 85)  BP: 124/79 (11-19-22 @ 05:00) (99/68 - 132/67)  RR: 18 (11-19-22 @ 05:00) (18 - 18)  SpO2: 99% (11-19-22 @ 05:00) (97% - 99%)            11-18 @ 07:01  -  11-19 @ 07:00  --------------------------------------------------------  IN: 800 mL / OUT: 1500 mL / NET: -700 mL       Daily     Daily   Admit Wt: Drug Dosing Weight  Height (cm): 170.2 (10 Oct 2022 13:39)  Weight (kg): 67 (10 Oct 2022 13:39)  BMI (kg/m2): 23.1 (10 Oct 2022 13:39)  BSA (m2): 1.78 (10 Oct 2022 13:39)      CAPILLARY BLOOD GLUCOSE      POCT Blood Glucose.: 238 mg/dL (19 Nov 2022 08:01)  POCT Blood Glucose.: 162 mg/dL (19 Nov 2022 03:13)  POCT Blood Glucose.: 248 mg/dL (18 Nov 2022 21:25)  POCT Blood Glucose.: 184 mg/dL (18 Nov 2022 16:43)  POCT Blood Glucose.: 266 mg/dL (18 Nov 2022 11:38)          acetaminophen    Suspension .. 650 milliGRAM(s) Oral every 6 hours PRN  acetylcysteine 10%  Inhalation 4 milliLiter(s) Inhalation every 12 hours  albuterol/ipratropium for Nebulization 3 milliLiter(s) Nebulizer every 6 hours  apixaban 5 milliGRAM(s) Enteral Tube every 12 hours  ascorbic acid 500 milliGRAM(s) Oral daily  buDESOnide    Inhalation Suspension 0.5 milliGRAM(s) Inhalation every 12 hours  calamine/zinc oxide Lotion 1 Application(s) Topical every 8 hours PRN  chlorhexidine 2% Cloths 1 Application(s) Topical <User Schedule>  collagenase Ointment 1 Application(s) Topical daily  dextrose 50% Injectable 25 Gram(s) IV Push once  dextrose 50% Injectable 12.5 Gram(s) IV Push once  dextrose 50% Injectable 25 Gram(s) IV Push once  dextrose Oral Gel 15 Gram(s) Oral once  diphenhydramine 2%/zinc acetate 0.1% Cream 1 Application(s) Topical every 8 hours  fluconAZOLE   Tablet 400 milliGRAM(s) Oral daily  glucagon  Injectable 1 milliGRAM(s) IntraMuscular once  hydrALAZINE 50 milliGRAM(s) Oral three times a day  influenza  Vaccine (FLUBLOK) 0.5 milliLiter(s) IntraMuscular once  insulin lispro (ADMELOG) corrective regimen sliding scale   SubCutaneous three times a day before meals  insulin lispro (ADMELOG) corrective regimen sliding scale   SubCutaneous <User Schedule>  insulin lispro Injectable (ADMELOG) 2 Unit(s) SubCutaneous three times a day with meals  magnesium oxide 400 milliGRAM(s) Oral every 8 hours  methylPREDNISolone 8 milliGRAM(s) Oral daily  metoclopramide Injectable 5 milliGRAM(s) IV Push every 8 hours  metoprolol tartrate 12.5 milliGRAM(s) Oral two times a day  mexiletine 200 milliGRAM(s) Oral every 8 hours  multivitamin 1 Tablet(s) Oral daily  nystatin Powder 1 Application(s) Topical two times a day  pantoprazole  Injectable 40 milliGRAM(s) IV Push daily  sodium chloride 0.9% lock flush 3 milliLiter(s) IV Push every 8 hours  trimethoprim   80 mG/sulfamethoxazole 400 mG 1 Tablet(s) Oral daily      PHYSICAL EXAM    Subjective: "Hi.   Neurology: alert and oriented x 3, nonfocal, no gross deficits  CV : tele:  RSR  Sternal Wound :  CDI with dressing , Stable  Lungs: clear. RR easy, unlabored   Abdomen: soft, nontender, nondistended, positive bowel sounds, bowel movement   Neg N/V/D   :  pt voiding without difficulty   Extremities:   POOLE; edema, neg calf tenderness.   PPP bilaterally      PW:  Chest tubes:                 VITAL SIGNS    Telemetry:  rsr    Vital Signs Last 24 Hrs  T(C): 36.8 (11-19-22 @ 05:00), Max: 37 (11-18-22 @ 20:37)  T(F): 98.2 (11-19-22 @ 05:00), Max: 98.6 (11-18-22 @ 20:37)  HR: 80 (11-19-22 @ 05:00) (74 - 85)  BP: 124/79 (11-19-22 @ 05:00) (99/68 - 132/67)  RR: 18 (11-19-22 @ 05:00) (18 - 18)  SpO2: 99% (11-19-22 @ 05:00) (97% - 99%)            11-18 @ 07:01  -  11-19 @ 07:00  --------------------------------------------------------  IN: 800 mL / OUT: 1500 mL / NET: -700 mL       Daily     Daily   Admit Wt: Drug Dosing Weight  Height (cm): 170.2 (10 Oct 2022 13:39)  Weight (kg): 67 (10 Oct 2022 13:39)  BMI (kg/m2): 23.1 (10 Oct 2022 13:39)  BSA (m2): 1.78 (10 Oct 2022 13:39)      CAPILLARY BLOOD GLUCOSE      POCT Blood Glucose.: 238 mg/dL (19 Nov 2022 08:01)  POCT Blood Glucose.: 162 mg/dL (19 Nov 2022 03:13)  POCT Blood Glucose.: 248 mg/dL (18 Nov 2022 21:25)  POCT Blood Glucose.: 184 mg/dL (18 Nov 2022 16:43)  POCT Blood Glucose.: 266 mg/dL (18 Nov 2022 11:38)          acetaminophen    Suspension .. 650 milliGRAM(s) Oral every 6 hours PRN  acetylcysteine 10%  Inhalation 4 milliLiter(s) Inhalation every 12 hours  albuterol/ipratropium for Nebulization 3 milliLiter(s) Nebulizer every 6 hours  apixaban 5 milliGRAM(s) Enteral Tube every 12 hours  ascorbic acid 500 milliGRAM(s) Oral daily  buDESOnide    Inhalation Suspension 0.5 milliGRAM(s) Inhalation every 12 hours  calamine/zinc oxide Lotion 1 Application(s) Topical every 8 hours PRN  chlorhexidine 2% Cloths 1 Application(s) Topical <User Schedule>  collagenase Ointment 1 Application(s) Topical daily  dextrose 50% Injectable 25 Gram(s) IV Push once  dextrose 50% Injectable 12.5 Gram(s) IV Push once  dextrose 50% Injectable 25 Gram(s) IV Push once  dextrose Oral Gel 15 Gram(s) Oral once  diphenhydramine 2%/zinc acetate 0.1% Cream 1 Application(s) Topical every 8 hours  fluconAZOLE   Tablet 400 milliGRAM(s) Oral daily  glucagon  Injectable 1 milliGRAM(s) IntraMuscular once  hydrALAZINE 50 milliGRAM(s) Oral three times a day  influenza  Vaccine (FLUBLOK) 0.5 milliLiter(s) IntraMuscular once  insulin lispro (ADMELOG) corrective regimen sliding scale   SubCutaneous three times a day before meals  insulin lispro (ADMELOG) corrective regimen sliding scale   SubCutaneous <User Schedule>  insulin lispro Injectable (ADMELOG) 2 Unit(s) SubCutaneous three times a day with meals  magnesium oxide 400 milliGRAM(s) Oral every 8 hours  methylPREDNISolone 8 milliGRAM(s) Oral daily  metoclopramide Injectable 5 milliGRAM(s) IV Push every 8 hours  metoprolol tartrate 12.5 milliGRAM(s) Oral two times a day  mexiletine 200 milliGRAM(s) Oral every 8 hours  multivitamin 1 Tablet(s) Oral daily  nystatin Powder 1 Application(s) Topical two times a day  pantoprazole  Injectable 40 milliGRAM(s) IV Push daily  sodium chloride 0.9% lock flush 3 milliLiter(s) IV Push every 8 hours  trimethoprim   80 mG/sulfamethoxazole 400 mG 1 Tablet(s) Oral daily       PHYSICAL EXAM    Subjective: "I feel ok."  Neurology: alert and oriented x 3, nonfocal, no gross deficits  CV : tele:  RSR 70-90  Sternal Wound :  CDI BARRETT- healing well   Lungs: + bilateral rhonchi;  RR easy, unlabored; former trach site cdi w/ dressing   Abdomen: soft, nontender, nondistended, + bs; + colostomy- stoma intact; Neg N/V/D  :  pt voiding without difficulty   Extremities:   POOLE; neg LE edema, neg calf tenderness.   PPP bilaterally; rt foot dressing cdi; rt elbow vac intact       PW: no  Chest tubes: none

## 2022-11-19 NOTE — CHART NOTE - NSCHARTNOTEFT_GEN_A_CORE
Age: 72y    Gender: Female    POCT Blood Glucose:  360 mg/dL (11-19-22 @ 13:00)  238 mg/dL (11-19-22 @ 08:01)  162 mg/dL (11-19-22 @ 03:13)  248 mg/dL (11-18-22 @ 21:25)  184 mg/dL (11-18-22 @ 16:43)      eMAR:  insulin lispro (ADMELOG) corrective regimen sliding scale   5 Unit(s) SubCutaneous (11-19-22 @ 13:14)   2 Unit(s) SubCutaneous (11-19-22 @ 08:28)   1 Unit(s) SubCutaneous (11-18-22 @ 17:39)    insulin lispro Injectable (ADMELOG)   2 Unit(s) SubCutaneous (11-19-22 @ 13:14)   2 Unit(s) SubCutaneous (11-19-22 @ 08:29)   2 Unit(s) SubCutaneous (11-18-22 @ 17:39)    methylPREDNISolone   8 milliGRAM(s) Oral (11-19-22 @ 05:08)    per discussion with team pt is tolerating PO and eating most of meal, increase Admelog to 4 units AC and monitor for improvement in BG. rest of care per last progress note   discussed with Makenzie HAGER

## 2022-11-19 NOTE — PROGRESS NOTE ADULT - PROBLEM SELECTOR PLAN 5
ID following   MRSA bacteremia last + culture 9/28  last positive Candida blood culture 9/30  MRSA  bacteremia and candidemia  Per IDYoni plan to treat for 6 weeks in the setting of post surgical repair of thoracic aorta dissection through 11/15/22 after which she may need chronic oral suppression with Bactrim plus fluconazole  IV vanco d/c 11/16 as per ID

## 2022-11-19 NOTE — PROGRESS NOTE ADULT - PROBLEM SELECTOR PLAN 1
s/p repair with modified Bentall procedure and hemiarch replacement on 9/6/22  continue eliquis for AC  continue lop 12.5 bid and mexitil 200 q8 for postop afib now rsr  continue hydral 50 q8 for htn  continue bronchodilators and pred 8 mg qd for COPD   dental called today: pt removed own dentures 2 days ago and now c/o rt upper jaw/ mouth pain   rt elbow vac changed today  abx as per ID- pt will need chronic suppression as per ID; bactrim and diflucan; IV vanco d/c 11/16 as per ID   pulm toilet  pain management  increase activity as tolerated  bowel regimen  discharge planning- rehab when stable s/p repair with modified Bentall procedure and hemiarch replacement on 9/6/22  continue eliquis for AC  continue lop 12.5 bid and mexitil 200 q8 for postop afib now rsr  continue hydral 50 q8 for htn  continue bronchodilators and pred 8 mg qd for COPD   dental called today: pt removed own dentures 2 days ago and now c/o rt upper jaw/ mouth pain   rt elbow vac changed today  abx as per ID- pt will need chronic suppression as per ID; bactrim and diflucan; IV vanco d/c 11/16 as per ID   pulm toilet  pain management  increase activity as tolerated  bowel regimen  discharge planning- rehab when stable mon/ tue ; ck covid pcr sun

## 2022-11-19 NOTE — PROGRESS NOTE ADULT - PROBLEM SELECTOR PLAN 2
SABINO. Qasim following   continue admelog SS   11/17 puree diet w/ mod thick liq  nph and admelog as per endo   accuchecks

## 2022-11-19 NOTE — PROGRESS NOTE ADULT - PA/NP ONLY VISIT
PA/NP only visit

## 2022-11-20 LAB
GLUCOSE BLDC GLUCOMTR-MCNC: 111 MG/DL — HIGH (ref 70–99)
GLUCOSE BLDC GLUCOMTR-MCNC: 140 MG/DL — HIGH (ref 70–99)
GLUCOSE BLDC GLUCOMTR-MCNC: 163 MG/DL — HIGH (ref 70–99)
GLUCOSE BLDC GLUCOMTR-MCNC: 284 MG/DL — HIGH (ref 70–99)
GLUCOSE BLDC GLUCOMTR-MCNC: 310 MG/DL — HIGH (ref 70–99)

## 2022-11-20 PROCEDURE — 99233 SBSQ HOSP IP/OBS HIGH 50: CPT

## 2022-11-20 PROCEDURE — 99232 SBSQ HOSP IP/OBS MODERATE 35: CPT

## 2022-11-20 RX ORDER — INSULIN LISPRO 100/ML
4 VIAL (ML) SUBCUTANEOUS
Refills: 0 | Status: DISCONTINUED | OUTPATIENT
Start: 2022-11-21 | End: 2022-11-21

## 2022-11-20 RX ORDER — INSULIN LISPRO 100/ML
VIAL (ML) SUBCUTANEOUS
Refills: 0 | Status: DISCONTINUED | OUTPATIENT
Start: 2022-11-20 | End: 2022-11-21

## 2022-11-20 RX ORDER — INSULIN GLARGINE 100 [IU]/ML
4 INJECTION, SOLUTION SUBCUTANEOUS EVERY MORNING
Refills: 0 | Status: DISCONTINUED | OUTPATIENT
Start: 2022-11-21 | End: 2022-11-22

## 2022-11-20 RX ORDER — INSULIN LISPRO 100/ML
3 VIAL (ML) SUBCUTANEOUS
Refills: 0 | Status: DISCONTINUED | OUTPATIENT
Start: 2022-11-20 | End: 2022-11-22

## 2022-11-20 RX ORDER — INSULIN LISPRO 100/ML
3 VIAL (ML) SUBCUTANEOUS
Refills: 0 | Status: DISCONTINUED | OUTPATIENT
Start: 2022-11-21 | End: 2022-11-21

## 2022-11-20 RX ADMIN — Medication 3: at 10:20

## 2022-11-20 RX ADMIN — Medication 5 MILLIGRAM(S): at 22:18

## 2022-11-20 RX ADMIN — FLUCONAZOLE 400 MILLIGRAM(S): 150 TABLET ORAL at 13:14

## 2022-11-20 RX ADMIN — SODIUM CHLORIDE 3 MILLILITER(S): 9 INJECTION INTRAMUSCULAR; INTRAVENOUS; SUBCUTANEOUS at 14:43

## 2022-11-20 RX ADMIN — MEXILETINE HYDROCHLORIDE 200 MILLIGRAM(S): 150 CAPSULE ORAL at 22:19

## 2022-11-20 RX ADMIN — Medication 4: at 17:10

## 2022-11-20 RX ADMIN — Medication 1 APPLICATION(S): at 22:56

## 2022-11-20 RX ADMIN — Medication 8 MILLIGRAM(S): at 05:23

## 2022-11-20 RX ADMIN — SODIUM CHLORIDE 3 MILLILITER(S): 9 INJECTION INTRAMUSCULAR; INTRAVENOUS; SUBCUTANEOUS at 22:56

## 2022-11-20 RX ADMIN — APIXABAN 5 MILLIGRAM(S): 2.5 TABLET, FILM COATED ORAL at 17:42

## 2022-11-20 RX ADMIN — Medication 50 MILLIGRAM(S): at 14:43

## 2022-11-20 RX ADMIN — Medication 1 TABLET(S): at 14:41

## 2022-11-20 RX ADMIN — MAGNESIUM OXIDE 400 MG ORAL TABLET 400 MILLIGRAM(S): 241.3 TABLET ORAL at 22:18

## 2022-11-20 RX ADMIN — MAGNESIUM OXIDE 400 MG ORAL TABLET 400 MILLIGRAM(S): 241.3 TABLET ORAL at 14:42

## 2022-11-20 RX ADMIN — Medication 3 MILLILITER(S): at 17:41

## 2022-11-20 RX ADMIN — Medication 12.5 MILLIGRAM(S): at 05:24

## 2022-11-20 RX ADMIN — Medication 1 TABLET(S): at 13:13

## 2022-11-20 RX ADMIN — MEXILETINE HYDROCHLORIDE 200 MILLIGRAM(S): 150 CAPSULE ORAL at 05:23

## 2022-11-20 RX ADMIN — Medication 3 MILLILITER(S): at 00:21

## 2022-11-20 RX ADMIN — SODIUM CHLORIDE 3 MILLILITER(S): 9 INJECTION INTRAMUSCULAR; INTRAVENOUS; SUBCUTANEOUS at 05:58

## 2022-11-20 RX ADMIN — Medication 4 UNIT(S): at 10:20

## 2022-11-20 RX ADMIN — Medication 4 MILLILITER(S): at 12:01

## 2022-11-20 RX ADMIN — Medication 3 UNIT(S): at 17:11

## 2022-11-20 RX ADMIN — Medication 1 APPLICATION(S): at 12:36

## 2022-11-20 RX ADMIN — Medication 0.5 MILLIGRAM(S): at 12:02

## 2022-11-20 RX ADMIN — Medication 4 UNIT(S): at 13:50

## 2022-11-20 RX ADMIN — Medication 5 MILLIGRAM(S): at 13:51

## 2022-11-20 RX ADMIN — Medication 50 MILLIGRAM(S): at 22:18

## 2022-11-20 RX ADMIN — NYSTATIN CREAM 1 APPLICATION(S): 100000 CREAM TOPICAL at 17:12

## 2022-11-20 RX ADMIN — Medication 4 MILLILITER(S): at 22:57

## 2022-11-20 RX ADMIN — NYSTATIN CREAM 1 APPLICATION(S): 100000 CREAM TOPICAL at 05:25

## 2022-11-20 RX ADMIN — PANTOPRAZOLE SODIUM 40 MILLIGRAM(S): 20 TABLET, DELAYED RELEASE ORAL at 13:51

## 2022-11-20 RX ADMIN — Medication 50 MILLIGRAM(S): at 05:23

## 2022-11-20 RX ADMIN — MAGNESIUM OXIDE 400 MG ORAL TABLET 400 MILLIGRAM(S): 241.3 TABLET ORAL at 05:24

## 2022-11-20 RX ADMIN — CHLORHEXIDINE GLUCONATE 1 APPLICATION(S): 213 SOLUTION TOPICAL at 05:58

## 2022-11-20 RX ADMIN — Medication 500 MILLIGRAM(S): at 14:41

## 2022-11-20 RX ADMIN — Medication 5 MILLIGRAM(S): at 05:23

## 2022-11-20 RX ADMIN — APIXABAN 5 MILLIGRAM(S): 2.5 TABLET, FILM COATED ORAL at 05:24

## 2022-11-20 RX ADMIN — MEXILETINE HYDROCHLORIDE 200 MILLIGRAM(S): 150 CAPSULE ORAL at 13:52

## 2022-11-20 RX ADMIN — Medication 3 MILLILITER(S): at 12:01

## 2022-11-20 RX ADMIN — Medication 12.5 MILLIGRAM(S): at 17:41

## 2022-11-20 RX ADMIN — Medication 1: at 13:50

## 2022-11-20 NOTE — PROGRESS NOTE ADULT - PROBLEM SELECTOR PLAN 1
-test BG ac meals/hs and 6am daily  -Start Lantus 4 units QAM daily at 6am  -Adjust Admelog 4-3-3 w/meals  -c/W Admelog low correction scale ac/hs and 6am for now  -Will adjust insulin as needed  -diet-change to consistent carb diet  Discharge plan:  - Likely to discharge patient on basal/bolus insulin. Final regimen pending clinical course.  - Recommend routine outpatient ophthalmology, podiatry  - Can f/u with endocrinologist Dr. Saavedra.  - Make sure pt has Rx for all DM supplies and insulin/ DM meds.  -Based on pt's clinical condition and mental status at this time she is not able to independently manage her DM. Will evaluate pt's ability to manage DM at time of discharge. If unable> pt will need family/ care giver (s) to manage DM care once at home  -Will need rehab.

## 2022-11-20 NOTE — PROGRESS NOTE ADULT - ASSESSMENT
72 F w/h/o uncontrolled T2DM (A1C 9.4%) on basal/bolus insulin PTA. Unknown DM complications. Also COPD, secondary adrenal Insufficiency on chronic steroids, colorectal cancer s/p resection (colostomy bag), Chronic A fib on Eliquis, and tracheomalacia and multiple intubations, recent dx of OM presented to ED at [facility] with epigastric pain, belching and central chest pain. Found on CTA to have type A aortic dissection and transferred to [facility] for surgical evaluation by Dr. Cabrera. Now s/p aortic dissection repair on 9/07/22, sepsis, and now s/p trach 10/10 and more recently s/p R elbow eschar debridement 10/22 > Wound VAC 10/24. Endocrine consulted for assistance with uncontrolled DM/steroids for AI. Tolerating POs w/variable glycemic control given timing of steroid/effect on glucose. BG goal (100-180mg/dl).

## 2022-11-20 NOTE — PROGRESS NOTE ADULT - SUBJECTIVE AND OBJECTIVE BOX
VITAL SIGNS    Telemetry:      Vital Signs Last 24 Hrs  T(C): 36.9 (11-20-22 @ 05:05), Max: 36.9 (11-19-22 @ 18:00)  T(F): 98.4 (11-20-22 @ 05:05), Max: 98.4 (11-19-22 @ 18:00)  HR: 72 (11-20-22 @ 05:05) (72 - 93)  BP: 114/72 (11-20-22 @ 05:05) (107/69 - 135/70)  RR: 18 (11-20-22 @ 05:05) (18 - 18)  SpO2: 96% (11-20-22 @ 05:05) (95% - 96%)                   11-19 @ 07:01  -  11-20 @ 07:00  --------------------------------------------------------  IN: 360 mL / OUT: 601 mL / NET: -241 mL    11-20 @ 07:01  -  11-20 @ 13:29  --------------------------------------------------------  IN: 0 mL / OUT: 400 mL / NET: -400 mL          Daily     Daily             CAPILLARY BLOOD GLUCOSE      POCT Blood Glucose.: 284 mg/dL (20 Nov 2022 10:04)  POCT Blood Glucose.: 140 mg/dL (20 Nov 2022 03:03)  POCT Blood Glucose.: 110 mg/dL (19 Nov 2022 21:30)  POCT Blood Glucose.: 262 mg/dL (19 Nov 2022 16:50)            Drains:     MS         [  ] Drainage:                 L Pleural  [  ]  Drainage:                R Pleural  [  ]  Drainage:    Pacing Wires        [  ]   Settings:                                  Isolated  [  ]    Coumadin    [ ] YES          [  ]      NO                                   PHYSICAL EXAM        Neurology: alert and oriented x 3, nonfocal, no gross deficits  CV : s1 s2 RRR  Sternal Wound :  CDI , Stable  Lungs: cta  Abdomen: soft, nontender, nondistended, positive bowel sounds, last bowel movement                       chest tubes  :    voiding / ramirez - sbd         Extremities:      edema   /  -   calve tenderness ,    L leg  /  R leg  incisions cdi          acetaminophen    Suspension .. 650 milliGRAM(s) Oral every 6 hours PRN  acetylcysteine 10%  Inhalation 4 milliLiter(s) Inhalation every 12 hours  albuterol/ipratropium for Nebulization 3 milliLiter(s) Nebulizer every 6 hours  apixaban 5 milliGRAM(s) Enteral Tube every 12 hours  ascorbic acid 500 milliGRAM(s) Oral daily  buDESOnide    Inhalation Suspension 0.5 milliGRAM(s) Inhalation every 12 hours  calamine/zinc oxide Lotion 1 Application(s) Topical every 8 hours PRN  chlorhexidine 2% Cloths 1 Application(s) Topical <User Schedule>  collagenase Ointment 1 Application(s) Topical daily  dextrose 50% Injectable 25 Gram(s) IV Push once  dextrose 50% Injectable 12.5 Gram(s) IV Push once  dextrose 50% Injectable 25 Gram(s) IV Push once  dextrose Oral Gel 15 Gram(s) Oral once  diphenhydramine 2%/zinc acetate 0.1% Cream 1 Application(s) Topical every 8 hours  fluconAZOLE   Tablet 400 milliGRAM(s) Oral daily  glucagon  Injectable 1 milliGRAM(s) IntraMuscular once  hydrALAZINE 50 milliGRAM(s) Oral three times a day  influenza  Vaccine (FLUBLOK) 0.5 milliLiter(s) IntraMuscular once  insulin lispro (ADMELOG) corrective regimen sliding scale   SubCutaneous three times a day before meals  insulin lispro (ADMELOG) corrective regimen sliding scale   SubCutaneous <User Schedule>  insulin lispro Injectable (ADMELOG) 4 Unit(s) SubCutaneous three times a day before meals  magnesium oxide 400 milliGRAM(s) Oral every 8 hours  methylPREDNISolone 8 milliGRAM(s) Oral daily  metoclopramide Injectable 5 milliGRAM(s) IV Push every 8 hours  metoprolol tartrate 12.5 milliGRAM(s) Oral two times a day  mexiletine 200 milliGRAM(s) Oral every 8 hours  multivitamin 1 Tablet(s) Oral daily  nystatin Powder 1 Application(s) Topical two times a day  pantoprazole  Injectable 40 milliGRAM(s) IV Push daily  sodium chloride 0.9% lock flush 3 milliLiter(s) IV Push every 8 hours  trimethoprim   80 mG/sulfamethoxazole 400 mG 1 Tablet(s) Oral daily                    Physical Therapy Rec:   Home  [  ]   Home w/ PT  [  ]  Rehab  [  ]  Discussed with Cardiothoracic Team at AM rounds.

## 2022-11-20 NOTE — PROGRESS NOTE ADULT - PROBLEM SELECTOR PLAN 3
Off rosuvastatin 5mg daily  Re start if not contraindicated and as per cardiology  -F/u levels as out pt.    discussed w/pt and CT surgery team  Can be reached via TEAMS/pager: [phone]   office:  [phone] (M-F 9a-5pm)               [phone] (nights/weekends)   [email or web address] password [email or web address]

## 2022-11-20 NOTE — PROGRESS NOTE ADULT - ASSESSMENT
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 Now tolerating PO diet, NGT removed. Pt with baseline multifactorial dyspnea in the setting of persistent asthma and TBM s/p tracheoplasty but doing well overall.    -Pt now stable from a respiratory standpoint. Decannulated on 11/14. Continue to wean O2 supplementation for goal O2 saturation >90%. Ambulatory O2 saturation with NC/RA.  -Pain control and wound care f/u  -c/w asthma regimen: methylprednisolone 8mg QD, Singulair 10mg QD, DuoNebs q6hr atc, Budesonide nebs .5 BID  -c/w Tezspire last given 8/29, was due 9/29 but can be given next upon hospital d/c  -Pt with hx TBM s/p tracheoplasty with chronic secretions. c/w airway clearance therapy: Bronchodilators followed by acapella and/or vest rx.   -appreciate CT Sx recs s/p aortic aneurysm repair  -AF-on rate control with lopressor and A/C with eliquis   -Suppressive abx regimen as per ID and primary team.  -PT and OOB as tolerated. Now with PO diet. Swallow team recs appreciated.

## 2022-11-20 NOTE — PROGRESS NOTE ADULT - ASSESSMENT
71 year-old female with a history of DM2, CAD, A-Fib on apixaban, Severe Persistent Asthma (on chronic steroids, recently started on Tezspire), colon cancer s/p resection/chemo, and tracheobronchomalacia s/p tracheoplasty, and recent OM of the R foot s/b debridement and completed course of Vancomycin/Ertapenem for MRSA/ESBL Proteus/Corynebacterium who now presents with chest pain, found to have Type A dissection s/p repair with modified Bentall procedure and hemiarch replacement on 22. Post-op course complicated by acute hypoxemic respiratory failure, shock, anemia, and hyperglycemia requiring insulin gtt. Extubated , now awake and alert with mild encephalopathy but overall significantly improved.    Assessment:  Acute hypoxemic respiratory failure requiring intubation  Type A Aortic Dissection  Severe Persistent Asthma  History of Tracheobronchomalacia    Plan:  See below:  -**************************                                SEE Below:  -improving but weakness  9/15-ABX adjusted to ceftriaxone (LFTS)-PICU  -PICU, low grade temp-fever work up in progress, no resp decline or sx  -resp stable at present but tenuous  -for FEESST today, NGT in place w/TF  -s/p FEESST-passed, remains in PICU          -TF in place at 40cc, more OOB          -hypoglycemia noted and rx, no change in resp status  -vented/sedated-pressors/inotropes/ABX  -remains vented on nitrous/less O2 needs, improving LFTS                   -vented/semi sedated--less O2 needs  10/3-on vanco, weaning sedation/, all lines changed  10/6-no changes-now afebrile-on vanco   10/7-no weaning-remains off pressors  10/10-for trach, no weaning done               10/11-s/p trach-uneventful  10/12-TC and ;cpap done and tolerated well  10/13-weaning TC continues              10/14-mucus issues-TC continues  10/24-TC x mult days-stable-secretion issues remain           10/25-replaced on vent for secretions            10/26-TC in place, mult consults  10/27-no changes-TC continues    10/28-wound care-vac in place  10/31-TC in place, elbow wound vac in place  -resp stable, elbow vac changed--ABX as per ID     -no new issues over the day  11/3-no new events-on TC/OOB as able; ENT f/up for awake laryngoscopy  -ENT f/up, vest use-feels well                                  -no new events--await decannulation   -phonating well, wound vac right arm in place  -awaiting floor bed-board CICU                    11/10-CTU-vanco until -no new events                     -continued ambulation, TC remains  -stable over the day  -decannulated and phonating well, wants rehab  11/15-VSS no acute issues overnight, await swallow eval   VSS; RSR 70-80; npo w/ koafeed tf's; mbs today; diet advanced to puree diet w/ mild thick liquids; noctural tf; insulin adjusted as per H. endo as per diet changes; dental consult- pt removed own dentures 2 days ago and now c/o rt upper jaw/ mouth pain-->Traumatic ulcer due to denture prosthesis- salt water rinses recommended;   rt elbow vac changed today; abx as per ID- pt will need chronic suppression as per ID; bactrim and diflucan; d/c IV vanco today d/w ID    VSS - pt tolerating puree diet w/ moderately thickened liquids  will d/c nocturnal feeds & kaofeed   VSS; rsr 70-90; rt elbow vac change today; tolerating puree diet; ck covid pcr sun; rehab placement mon/ tue next week    VSS: RSR ;  tolerating puree diet; ck covid pcr sun; rehab placement mon/ tue next week     d/c planning   to rehab

## 2022-11-20 NOTE — PROGRESS NOTE ADULT - SUBJECTIVE AND OBJECTIVE BOX
Diabetes Follow up note:    Chief complaint: T2DM w/steroid induced hyperglycemia    Interval Hx: BG values 100-300s over past 24 hours. Pt seen at bedside. Pt reports eating only at mealtimes w/good appetite. DC planning for rehab this week. Pt requesting we not check her glucose at 3am and would prefer 6am as she is receiving steroids at that time and would like to sleep through the night.     Review of Systems:  General: denies pain  GI: Tolerating POs. Denies N/V/D/Abd pain  CV: Denies CP/SOB  ENDO: No S&Sx of hypoglycemia    MEDS:  insulin lispro (ADMELOG) corrective regimen sliding scale   SubCutaneous three times a day before meals  insulin lispro (ADMELOG) corrective regimen sliding scale   SubCutaneous <User Schedule>  insulin lispro Injectable (ADMELOG) 4 Unit(s) SubCutaneous three times a day before meals  methylPREDNISolone 8 milliGRAM(s) Oral daily    fluconAZOLE   Tablet 400 milliGRAM(s) Oral daily  trimethoprim   80 mG/sulfamethoxazole 400 mG 1 Tablet(s) Oral daily    Allergies    aspirin (Short breath)  Avelox (Short breath; Pruritus)  cefepime (Anaphylaxis)  codeine (Short breath)  Dilaudid (Short breath)  iodine (Short breath; Swelling)  penicillin (Anaphylaxis)  shellfish (Anaphylaxis)  tetanus toxoid (Short breath)  Valium (Short breath)      PE:  General: Female sitting in chair. NAD>   Vital Signs Last 24 Hrs  T(C): 36.9 (20 Nov 2022 05:05), Max: 36.9 (19 Nov 2022 18:00)  T(F): 98.4 (20 Nov 2022 05:05), Max: 98.4 (19 Nov 2022 18:00)  HR: 72 (20 Nov 2022 05:05) (72 - 93)  BP: 114/72 (20 Nov 2022 05:05) (107/69 - 135/70)  BP(mean): 86 (20 Nov 2022 05:05) (86 - 86)  RR: 18 (20 Nov 2022 05:05) (18 - 18)  SpO2: 96% (20 Nov 2022 05:05) (95% - 96%)    Parameters below as of 20 Nov 2022 05:05  Patient On (Oxygen Delivery Method): room air    HEENT: Trach dsg in place.   Abd: Soft, NT,ND,   Extremities: Warm. RUE wound to vac  Neuro: A&O X3    LABS:  POCT Blood Glucose.: 163 mg/dL (11-20-22 @ 13:38)  POCT Blood Glucose.: 284 mg/dL (11-20-22 @ 10:04)  POCT Blood Glucose.: 140 mg/dL (11-20-22 @ 03:03)  POCT Blood Glucose.: 110 mg/dL (11-19-22 @ 21:30)  POCT Blood Glucose.: 262 mg/dL (11-19-22 @ 16:50)  POCT Blood Glucose.: 360 mg/dL (11-19-22 @ 13:00)  POCT Blood Glucose.: 238 mg/dL (11-19-22 @ 08:01)  POCT Blood Glucose.: 162 mg/dL (11-19-22 @ 03:13)  POCT Blood Glucose.: 248 mg/dL (11-18-22 @ 21:25)  POCT Blood Glucose.: 184 mg/dL (11-18-22 @ 16:43)  POCT Blood Glucose.: 266 mg/dL (11-18-22 @ 11:38)  POCT Blood Glucose.: 211 mg/dL (11-18-22 @ 07:37)  POCT Blood Glucose.: 133 mg/dL (11-18-22 @ 03:04)  POCT Blood Glucose.: 182 mg/dL (11-17-22 @ 21:24)  POCT Blood Glucose.: 250 mg/dL (11-17-22 @ 16:54)                            10.8   9.47  )-----------( 281      ( 19 Nov 2022 11:09 )             37.8       11-19    132<L>  |  98  |  17  ----------------------------<  374<H>  5.0   |  20<L>  |  0.54    eGFR: 97    Ca    9.4      11-19  Mg     1.8     11-19  Phos  3.3     11-19        A1C with Estimated Average Glucose Result: 9.4 % (09-06-22 @ 16:46)  A1C with Estimated Average Glucose Result: 9.2 % (07-11-22 @ 07:36)  A1C with Estimated Average Glucose Result: 8.0 % (05-10-22 @ 12:26)  A1C with Estimated Average Glucose Result: 9.5 % (03-29-22 @ 13:50)  A1C with Estimated Average Glucose Result: 7.8 % (11-30-21 @ 09:05)          Contact number: karoline [phone] or [phone]

## 2022-11-20 NOTE — PROGRESS NOTE ADULT - PROBLEM SELECTOR PLAN 1
s/p repair with modified Bentall procedure and hemiarch replacement on 9/6/22  continue eliquis for AC  continue lop 12.5 bid and mexitil 200 q8 for postop afib now rsr  continue hydral 50 q8 for htn  continue bronchodilators and pred 8 mg qd for COPD   dental called today: pt removed own dentures 2 days ago and now c/o rt upper jaw/ mouth pain   rt elbow vac changed today  abx as per ID- pt will need chronic suppression as per ID; bactrim and diflucan; IV vanco d/c 11/16 as per ID   pulm toilet  pain management  increase activity as tolerated  bowel regimen  discharge planning- rehab when stable mon/ tue ; ck covid pcr sun

## 2022-11-20 NOTE — PROGRESS NOTE ADULT - NUTRITIONAL ASSESSMENT
Diet, Pureed:   Mildly Thick Liquids (MILDTHICKLIQS)  Liquid via Teaspoon Only No Straws (11-17-22 @ 17:44) [Active]

## 2022-11-20 NOTE — PROGRESS NOTE ADULT - SUBJECTIVE AND OBJECTIVE BOX
CHIEF COMPLAINT:  f/up sob, chronic resp failure, TBM, severe persistent asthma, VC dysfunction, Type A aortic dissection s/p repair w/modified "Bentall procedure and hemiarch replacement 9/6- decannulated-on RA, right shoulder pain upon movement, good breathing and now eating    Interval Events: Pt seen and examined at bedside. No acute events overnight. No new concerns or symptoms today.    REVIEW OF SYSTEMS:  Constitutional: No fevers or chills. No weight loss. No fatigue or generalized malaise.  Eyes: No itching or discharge from the eyes  ENT: No ear pain. No ear discharge. No nasal congestion. No post nasal drip. No epistaxis. No throat pain. No sore throat. No difficulty swallowing.   CV: No chest pain. No palpitations. No lightheadedness or dizziness.   Resp: No dyspnea at rest. No dyspnea on exertion. No orthopnea. No wheezing. No cough. No stridor. No sputum production. No chest pain with respiration.  GI: No nausea. No vomiting. No diarrhea.  MSK: No joint pain or pain in any extremities  Integumentary: No skin lesions. No pedal edema.  Neurological: No gross motor weakness. No sensory changes.R shoulder issues  [+ ] All other systems negative  [ ] Unable to assess ROS because ________    OBJECTIVE:  ICU Vital Signs Last 24 Hrs  T(C): 37.1 (17 Nov 2022 19:37), Max: 37.1 (17 Nov 2022 19:37)  T(F): 98.7 (17 Nov 2022 19:37), Max: 98.7 (17 Nov 2022 19:37)  HR: 79 (17 Nov 2022 21:50) (79 - 90)  BP: 136/81 (17 Nov 2022 21:50) (102/67 - 136/81)  BP(mean): 99 (17 Nov 2022 21:50) (99 - 99)  ABP: --  ABP(mean): --  RR: 18 (17 Nov 2022 21:50) (18 - 18)  SpO2: 98% (17 Nov 2022 21:50) (93% - 98%)    O2 Parameters below as of 18 Nov 2022 00:32  Patient On (Oxygen Delivery Method): room air    11-16 @ 07:01  -  11-17 @ 07:00  --------------------------------------------------------  IN: 750 mL / OUT: 800 mL / NET: -50 mL    11-17 @ 07:01  -  11-18 @ 04:54  --------------------------------------------------------  IN: 240 mL / OUT: 600 mL / NET: -360 mL      CAPILLARY BLOOD GLUCOSE      POCT Blood Glucose.: 133 mg/dL (18 Nov 2022 03:04)      PHYSICAL EXAM: NAD in bed on RA  General: Awake, alert, oriented X 3.   HEENT: Atraumatic, normocephalic.                 Mallampatti Grade 3                No nasal congestion.                No tonsillar or pharyngeal exudates.  Lymph Nodes: No palpable lymphadenopathy  Neck: No JVD. No carotid bruit.   Respiratory: Normal chest expansion                         Normal percussion                         Normal and equal air entry                         No wheeze, rhonchi or rales.  Cardiovascular: S1 S2 normal. No murmurs, rubs or gallops.   Abdomen: Soft, non-tender, non-distended. No organomegaly. Normoactive bowel sounds.  Extremities: Warm to touch. Peripheral pulse palpable. No pedal edema. R-shoulder issues  Skin: No rashes or skin lesions  Neurological: Motor and sensory examination equal and normal in all four extremities.  Psychiatry: Appropriate mood and affect.    HOSPITAL MEDICATIONS:  MEDICATIONS  (STANDING):  acetylcysteine 10%  Inhalation 4 milliLiter(s) Inhalation every 12 hours  albuterol/ipratropium for Nebulization 3 milliLiter(s) Nebulizer every 6 hours  apixaban 5 milliGRAM(s) Enteral Tube every 12 hours  ascorbic acid 500 milliGRAM(s) Oral daily  buDESOnide    Inhalation Suspension 0.5 milliGRAM(s) Inhalation every 12 hours  chlorhexidine 2% Cloths 1 Application(s) Topical <User Schedule>  collagenase Ointment 1 Application(s) Topical daily  dextrose 50% Injectable 25 Gram(s) IV Push once  dextrose 50% Injectable 12.5 Gram(s) IV Push once  dextrose 50% Injectable 25 Gram(s) IV Push once  dextrose Oral Gel 15 Gram(s) Oral once  diphenhydramine 2%/zinc acetate 0.1% Cream 1 Application(s) Topical every 8 hours  fluconAZOLE   Tablet 400 milliGRAM(s) Oral daily  glucagon  Injectable 1 milliGRAM(s) IntraMuscular once  hydrALAZINE 50 milliGRAM(s) Oral three times a day  influenza  Vaccine (FLUBLOK) 0.5 milliLiter(s) IntraMuscular once  insulin lispro (ADMELOG) corrective regimen sliding scale   SubCutaneous three times a day before meals  insulin lispro (ADMELOG) corrective regimen sliding scale   SubCutaneous <User Schedule>  insulin NPH human recombinant 3 Unit(s) SubCutaneous <User Schedule>  magnesium oxide 400 milliGRAM(s) Oral every 8 hours  methylPREDNISolone 8 milliGRAM(s) Oral daily  metoclopramide Injectable 5 milliGRAM(s) IV Push every 8 hours  metoprolol tartrate 12.5 milliGRAM(s) Oral two times a day  mexiletine 200 milliGRAM(s) Oral every 8 hours  multivitamin 1 Tablet(s) Oral daily  nystatin Powder 1 Application(s) Topical two times a day  pantoprazole  Injectable 40 milliGRAM(s) IV Push daily  sodium chloride 0.9% lock flush 3 milliLiter(s) IV Push every 8 hours  trimethoprim   80 mG/sulfamethoxazole 400 mG 1 Tablet(s) Oral daily    MEDICATIONS  (PRN):  acetaminophen    Suspension .. 650 milliGRAM(s) Oral every 6 hours PRN Temp greater or equal to 38C (100.4F), Mild Pain (1 - 3)  calamine/zinc oxide Lotion 1 Application(s) Topical every 8 hours PRN Itching      LABS:                        10.0   15.27 )-----------( 280      ( 17 Nov 2022 10:09 )             33.4     11-17    135  |  99  |  14  ----------------------------<  196<H>  4.4   |  25  |  0.47<L>    Ca    9.2      17 Nov 2022 10:09      MICROBIOLOGY:     RADIOLOGY:  [x ] Reviewed and interpreted by me    Point of Care Ultrasound Findings: N/A    PFT: None    EKG: Reviewed

## 2022-11-21 LAB
GLUCOSE BLDC GLUCOMTR-MCNC: 205 MG/DL — HIGH (ref 70–99)
GLUCOSE BLDC GLUCOMTR-MCNC: 228 MG/DL — HIGH (ref 70–99)
GLUCOSE BLDC GLUCOMTR-MCNC: 232 MG/DL — HIGH (ref 70–99)
GLUCOSE BLDC GLUCOMTR-MCNC: 297 MG/DL — HIGH (ref 70–99)
SARS-COV-2 RNA SPEC QL NAA+PROBE: SIGNIFICANT CHANGE UP

## 2022-11-21 PROCEDURE — 99232 SBSQ HOSP IP/OBS MODERATE 35: CPT

## 2022-11-21 RX ORDER — INSULIN LISPRO 100/ML
VIAL (ML) SUBCUTANEOUS
Refills: 0 | Status: DISCONTINUED | OUTPATIENT
Start: 2022-11-21 | End: 2022-11-22

## 2022-11-21 RX ORDER — INSULIN LISPRO 100/ML
5 VIAL (ML) SUBCUTANEOUS
Refills: 0 | Status: DISCONTINUED | OUTPATIENT
Start: 2022-11-22 | End: 2022-11-22

## 2022-11-21 RX ADMIN — Medication 3 MILLILITER(S): at 17:24

## 2022-11-21 RX ADMIN — Medication 2: at 12:33

## 2022-11-21 RX ADMIN — SODIUM CHLORIDE 3 MILLILITER(S): 9 INJECTION INTRAMUSCULAR; INTRAVENOUS; SUBCUTANEOUS at 22:35

## 2022-11-21 RX ADMIN — Medication 4 UNIT(S): at 08:08

## 2022-11-21 RX ADMIN — Medication 0.5 MILLIGRAM(S): at 17:24

## 2022-11-21 RX ADMIN — Medication 2: at 08:07

## 2022-11-21 RX ADMIN — INSULIN GLARGINE 4 UNIT(S): 100 INJECTION, SOLUTION SUBCUTANEOUS at 08:20

## 2022-11-21 RX ADMIN — FLUCONAZOLE 400 MILLIGRAM(S): 150 TABLET ORAL at 13:41

## 2022-11-21 RX ADMIN — MEXILETINE HYDROCHLORIDE 200 MILLIGRAM(S): 150 CAPSULE ORAL at 21:45

## 2022-11-21 RX ADMIN — Medication 1 APPLICATION(S): at 05:46

## 2022-11-21 RX ADMIN — Medication 1: at 21:42

## 2022-11-21 RX ADMIN — Medication 2: at 17:24

## 2022-11-21 RX ADMIN — Medication 12.5 MILLIGRAM(S): at 05:12

## 2022-11-21 RX ADMIN — Medication 4 MILLILITER(S): at 17:24

## 2022-11-21 RX ADMIN — SODIUM CHLORIDE 3 MILLILITER(S): 9 INJECTION INTRAMUSCULAR; INTRAVENOUS; SUBCUTANEOUS at 05:47

## 2022-11-21 RX ADMIN — Medication 5 MILLIGRAM(S): at 21:44

## 2022-11-21 RX ADMIN — Medication 3 UNIT(S): at 12:34

## 2022-11-21 RX ADMIN — Medication 1 TABLET(S): at 13:41

## 2022-11-21 RX ADMIN — PANTOPRAZOLE SODIUM 40 MILLIGRAM(S): 20 TABLET, DELAYED RELEASE ORAL at 13:42

## 2022-11-21 RX ADMIN — MEXILETINE HYDROCHLORIDE 200 MILLIGRAM(S): 150 CAPSULE ORAL at 05:11

## 2022-11-21 RX ADMIN — Medication 500 MILLIGRAM(S): at 13:41

## 2022-11-21 RX ADMIN — Medication 3 MILLILITER(S): at 12:33

## 2022-11-21 RX ADMIN — Medication 50 MILLIGRAM(S): at 05:12

## 2022-11-21 RX ADMIN — Medication 5 MILLIGRAM(S): at 13:41

## 2022-11-21 RX ADMIN — APIXABAN 5 MILLIGRAM(S): 2.5 TABLET, FILM COATED ORAL at 05:11

## 2022-11-21 RX ADMIN — Medication 3 UNIT(S): at 17:25

## 2022-11-21 RX ADMIN — MAGNESIUM OXIDE 400 MG ORAL TABLET 400 MILLIGRAM(S): 241.3 TABLET ORAL at 13:42

## 2022-11-21 RX ADMIN — Medication 0.5 MILLIGRAM(S): at 05:11

## 2022-11-21 RX ADMIN — APIXABAN 5 MILLIGRAM(S): 2.5 TABLET, FILM COATED ORAL at 17:23

## 2022-11-21 RX ADMIN — MAGNESIUM OXIDE 400 MG ORAL TABLET 400 MILLIGRAM(S): 241.3 TABLET ORAL at 21:45

## 2022-11-21 RX ADMIN — Medication 3 MILLILITER(S): at 05:10

## 2022-11-21 RX ADMIN — Medication 5 MILLIGRAM(S): at 05:11

## 2022-11-21 RX ADMIN — NYSTATIN CREAM 1 APPLICATION(S): 100000 CREAM TOPICAL at 05:47

## 2022-11-21 RX ADMIN — SODIUM CHLORIDE 3 MILLILITER(S): 9 INJECTION INTRAMUSCULAR; INTRAVENOUS; SUBCUTANEOUS at 13:14

## 2022-11-21 RX ADMIN — CHLORHEXIDINE GLUCONATE 1 APPLICATION(S): 213 SOLUTION TOPICAL at 05:46

## 2022-11-21 RX ADMIN — NYSTATIN CREAM 1 APPLICATION(S): 100000 CREAM TOPICAL at 18:00

## 2022-11-21 RX ADMIN — Medication 1 APPLICATION(S): at 10:53

## 2022-11-21 RX ADMIN — MAGNESIUM OXIDE 400 MG ORAL TABLET 400 MILLIGRAM(S): 241.3 TABLET ORAL at 05:12

## 2022-11-21 RX ADMIN — Medication 50 MILLIGRAM(S): at 21:45

## 2022-11-21 RX ADMIN — Medication 12.5 MILLIGRAM(S): at 17:23

## 2022-11-21 RX ADMIN — MEXILETINE HYDROCHLORIDE 200 MILLIGRAM(S): 150 CAPSULE ORAL at 13:40

## 2022-11-21 RX ADMIN — Medication 8 MILLIGRAM(S): at 05:12

## 2022-11-21 RX ADMIN — Medication 50 MILLIGRAM(S): at 13:43

## 2022-11-21 RX ADMIN — Medication 4 MILLILITER(S): at 05:10

## 2022-11-21 NOTE — PROGRESS NOTE ADULT - PROBLEM SELECTOR PROBLEM 4
Osteomyelitis
Osteomyelitis
Adrenal insufficiency
Osteomyelitis
Adrenal insufficiency
Asthma
Osteomyelitis
Adrenal insufficiency
Osteomyelitis
Asthma
Osteomyelitis
Osteomyelitis

## 2022-11-21 NOTE — PROGRESS NOTE ADULT - PROBLEM SELECTOR PROBLEM 1
Type 2 diabetes mellitus
Tracheostomy in place
Type 2 diabetes mellitus
Type 2 diabetes mellitus with hyperglycemia
Uncontrolled type 2 diabetes mellitus with hyperglycemia, with long-term current use of insulin
Tracheostomy in place
Type 2 diabetes mellitus with hyperglycemia
Uncontrolled type 2 diabetes mellitus with hyperglycemia, with long-term current use of insulin
Respiratory failure
Tracheostomy in place
Type 2 diabetes mellitus with hyperglycemia
Ascending aortic dissection
Ascending aortic dissection
Respiratory failure
Tracheostomy in place
Tracheostomy in place
Type 2 diabetes mellitus with hyperglycemia
Ascending aortic dissection
Ascending aortic dissection
Type 2 diabetes mellitus with hyperglycemia
Type 2 diabetes mellitus with hyperglycemia
Tracheostomy in place
Tracheostomy in place
Type 2 diabetes mellitus
Uncontrolled type 2 diabetes mellitus with hyperglycemia, with long-term current use of insulin
Ascending aortic dissection
Tracheostomy in place
Type 2 diabetes mellitus with hyperglycemia
Uncontrolled type 2 diabetes mellitus with hyperglycemia, with long-term current use of insulin
Type 2 diabetes mellitus with hyperglycemia
Ascending aortic dissection
Ascending aortic dissection
Type 2 diabetes mellitus with hyperglycemia
Ascending aortic dissection
Type 2 diabetes mellitus with hyperglycemia
Ascending aortic dissection
Type 2 diabetes mellitus with hyperglycemia
Type 2 diabetes mellitus with hyperglycemia
Ascending aortic dissection

## 2022-11-21 NOTE — PROGRESS NOTE ADULT - SUBJECTIVE AND OBJECTIVE BOX
Diabetes Follow up note:    Chief complaint: T2DM w/steroid induced hyperglycemia    Interval Hx: BG values 100-300s over past 24 hours. Pt seen at bedside. Reports tolerating POs w/continued good appetite. No hypoglycemia symptoms, pt reports only eating 3 meals a day. Pt c/o L foot discomfort. DC planning for rehab this week.     Review of Systems:  General: as above  GI: Tolerating POs. Denies N/V/D/Abd pain  CV: Denies CP/SOB  ENDO: No S&Sx of hypoglycemia    MEDS:  insulin glargine Injectable (LANTUS) 4 Unit(s) SubCutaneous every morning  insulin lispro (ADMELOG) corrective regimen sliding scale   SubCutaneous <User Schedule>  insulin lispro (ADMELOG) corrective regimen sliding scale   SubCutaneous three times a day before meals  insulin lispro Injectable (ADMELOG) 4 Unit(s) SubCutaneous before breakfast  insulin lispro Injectable (ADMELOG) 3 Unit(s) SubCutaneous before lunch  insulin lispro Injectable (ADMELOG) 3 Unit(s) SubCutaneous before dinner  methylPREDNISolone 8 milliGRAM(s) Oral daily    fluconAZOLE   Tablet 400 milliGRAM(s) Oral daily  trimethoprim   80 mG/sulfamethoxazole 400 mG 1 Tablet(s) Oral daily    Allergies    aspirin (Short breath)  Avelox (Short breath; Pruritus)  cefepime (Anaphylaxis)  codeine (Short breath)  Dilaudid (Short breath)  iodine (Short breath; Swelling)  penicillin (Anaphylaxis)  shellfish (Anaphylaxis)  tetanus toxoid (Short breath)  Valium (Short breath)        PE:  General: Female lying in bed. NAD.   Vital Signs Last 24 Hrs  T(C): 36.8 (21 Nov 2022 13:06), Max: 36.8 (20 Nov 2022 20:31)  T(F): 98.2 (21 Nov 2022 13:06), Max: 98.2 (20 Nov 2022 20:31)  HR: 96 (21 Nov 2022 13:06) (78 - 96)  BP: 138/82 (21 Nov 2022 13:06) (113/73 - 142/85)  BP(mean): 86 (21 Nov 2022 05:11) (86 - 104)  RR: 18 (21 Nov 2022 13:06) (18 - 18)  SpO2: 98% (21 Nov 2022 13:06) (96% - 98%)    Parameters below as of 21 Nov 2022 13:06  Patient On (Oxygen Delivery Method): room air    HEENT: Trach dsg c/d/i  Abd: Soft, NT,ND,   Extremities: Warm. RUE wound to vac  Neuro: A&O X3    LABS:  POCT Blood Glucose.: 232 mg/dL (11-21-22 @ 11:45)  POCT Blood Glucose.: 228 mg/dL (11-21-22 @ 07:42)  POCT Blood Glucose.: 111 mg/dL (11-20-22 @ 21:36)  POCT Blood Glucose.: 310 mg/dL (11-20-22 @ 16:35)  POCT Blood Glucose.: 163 mg/dL (11-20-22 @ 13:38)  POCT Blood Glucose.: 284 mg/dL (11-20-22 @ 10:04)  POCT Blood Glucose.: 140 mg/dL (11-20-22 @ 03:03)  POCT Blood Glucose.: 110 mg/dL (11-19-22 @ 21:30)  POCT Blood Glucose.: 262 mg/dL (11-19-22 @ 16:50)  POCT Blood Glucose.: 360 mg/dL (11-19-22 @ 13:00)  POCT Blood Glucose.: 238 mg/dL (11-19-22 @ 08:01)  POCT Blood Glucose.: 162 mg/dL (11-19-22 @ 03:13)  POCT Blood Glucose.: 248 mg/dL (11-18-22 @ 21:25)  POCT Blood Glucose.: 184 mg/dL (11-18-22 @ 16:43)          11-19    132<L>  |  98  |  17  ----------------------------<  374<H>  5.0   |  20<L>  |  0.54    eGFR: 97    Ca    9.4      11-19  Mg     1.8     11-19  Phos  3.3     11-19          A1C with Estimated Average Glucose Result: 9.4 % (09-06-22 @ 16:46)  A1C with Estimated Average Glucose Result: 9.2 % (07-11-22 @ 07:36)  A1C with Estimated Average Glucose Result: 8.0 % (05-10-22 @ 12:26)  A1C with Estimated Average Glucose Result: 9.5 % (03-29-22 @ 13:50)  A1C with Estimated Average Glucose Result: 7.8 % (11-30-21 @ 09:05)          Contact number: karoline [phone] or [phone]

## 2022-11-21 NOTE — PROGRESS NOTE ADULT - ASSESSMENT
71 year-old female with a history of DM2, CAD, A-Fib on apixaban, Severe Persistent Asthma (on chronic steroids, recently started on Tezspire), colon cancer s/p resection/chemo, and tracheobronchomalacia s/p tracheoplasty, and recent OM of the R foot s/b debridement and completed course of Vancomycin/Ertapenem for MRSA/ESBL Proteus/Corynebacterium who now presents with chest pain, found to have Type A dissection s/p repair with modified Bentall procedure and hemiarch replacement on 22. Post-op course complicated by acute hypoxemic respiratory failure, shock, anemia, and hyperglycemia requiring insulin gtt. Extubated -trached/decannulated.    Assessment:  Acute hypoxemic respiratory failure requiring intubation  Type A Aortic Dissection  Severe Persistent Asthma  History of Tracheobronchomalacia    Plan:  See below:  -**************************                                SEE Below:  -improving but weakness  9/15-ABX adjusted to ceftriaxone (LFTS)-PICU  -PICU, low grade temp-fever work up in progress, no resp decline or sx  -resp stable at present but tenuous  -for FEESST today, NGT in place w/TF  -s/p FEESST-passed, remains in PICU          -TF in place at 40cc, more OOB          -hypoglycemia noted and rx, no change in resp status  -vented/sedated-pressors/inotropes/ABX  -remains vented on nitrous/less O2 needs, improving LFTS                   -vented/semi sedated--less O2 needs  10/3-on vanco, weaning sedation/, all lines changed  10/6-no changes-now afebrile-on vanco   10/7-no weaning-remains off pressors  10/10-for trach, no weaning done               10/11-s/p trach-uneventful  10/12-TC and ;cpap done and tolerated well  10/13-weaning TC continues              10/14-mucus issues-TC continues  10/24-TC x mult days-stable-secretion issues remain           10/25-replaced on vent for secretions            10/26-TC in place, mult consults  10/27-no changes-TC continues    10/28-wound care-vac in place  10/31-TC in place, elbow wound vac in place  -resp stable, elbow vac changed--ABX as per ID     -no new issues over the day  11/3-no new events-on TC/OOB as able; ENT f/up for awake laryngoscopy  -ENT f/up, vest use-feels well                                  -no new events--await decannulation   -phonating well, wound vac right arm in place  -awaiting floor bed-board CICU                    11/10-CTU-vanco until -no new events                     -continued ambulation, TC remains  -stable over the day  -decannulated and phonating well, wants rehab  11/15-tx floor, await swallow evaln  -swallow evaln in progress    -MS pain right shoulder                   -NGT removed and eating well  -awaiting rehab as per pt

## 2022-11-21 NOTE — PROGRESS NOTE ADULT - SUBJECTIVE AND OBJECTIVE BOX
VITAL SIGNS-Telemetry:  SR 60-90  Vital Signs Last 24 Hrs  T(C): 36.8 (22 @ 13:06), Max: 36.8 (22 @ 20:31)  T(F): 98.2 (22 @ 13:06), Max: 98.2 (22 @ 20:31)  HR: 96 (22 @ 13:06) (78 - 96)  BP: 138/82 (22 @ 13:06) (113/73 - 142/85)  RR: 18 (22 @ 13:06) (18 - 18)  SpO2: 98% (22 @ 13:06) (96% - 98%)          @ 07:01  -   @ 07:00  --------------------------------------------------------  IN: 490 mL / OUT: 750 mL / NET: -260 mL     @ 07:01  -   @ 16:40  --------------------------------------------------------  IN: 600 mL / OUT: 350 mL / NET: 250 mL    Daily     Daily Weight in k.3 (2022 10:30)    CAPILLARY BLOOD GLUCOSE  POCT Blood Glucose.: 205 mg/dL (2022 16:37)  POCT Blood Glucose.: 232 mg/dL (2022 11:45)  POCT Blood Glucose.: 228 mg/dL (2022 07:42)  POCT Blood Glucose.: 111 mg/dL (2022 21:36)       PHYSICAL EXAM:  Neurology: alert and oriented x 3, nonfocal, no gross deficits  CV : S1S2  Sternal Wound :  CDI , Stable  Lungs: cta  Abdomen: soft, nontender, nondistended, positive bowel sounds, last bowel movement         Extremities:     tr edema, no calf tendenress right foot dsg  right elbow w/ aleven dressing - vac d/c'd    acetaminophen    Suspension .. 650 milliGRAM(s) Oral every 6 hours PRN  acetylcysteine 10%  Inhalation 4 milliLiter(s) Inhalation every 12 hours  albuterol/ipratropium for Nebulization 3 milliLiter(s) Nebulizer every 6 hours  apixaban 5 milliGRAM(s) Enteral Tube every 12 hours  ascorbic acid 500 milliGRAM(s) Oral daily  buDESOnide    Inhalation Suspension 0.5 milliGRAM(s) Inhalation every 12 hours  calamine/zinc oxide Lotion 1 Application(s) Topical every 8 hours PRN  chlorhexidine 2% Cloths 1 Application(s) Topical <User Schedule>  collagenase Ointment 1 Application(s) Topical daily  dextrose 50% Injectable 25 Gram(s) IV Push once  dextrose 50% Injectable 12.5 Gram(s) IV Push once  dextrose 50% Injectable 25 Gram(s) IV Push once  dextrose Oral Gel 15 Gram(s) Oral once  diphenhydramine 2%/zinc acetate 0.1% Cream 1 Application(s) Topical every 8 hours  fluconAZOLE   Tablet 400 milliGRAM(s) Oral daily  glucagon  Injectable 1 milliGRAM(s) IntraMuscular once  hydrALAZINE 50 milliGRAM(s) Oral three times a day  influenza  Vaccine (FLUBLOK) 0.5 milliLiter(s) IntraMuscular once  insulin glargine Injectable (LANTUS) 4 Unit(s) SubCutaneous every morning  insulin lispro (ADMELOG) corrective regimen sliding scale   SubCutaneous <User Schedule>  insulin lispro (ADMELOG) corrective regimen sliding scale   SubCutaneous three times a day before meals  insulin lispro Injectable (ADMELOG) 3 Unit(s) SubCutaneous before dinner  magnesium oxide 400 milliGRAM(s) Oral every 8 hours  methylPREDNISolone 8 milliGRAM(s) Oral daily  metoclopramide Injectable 5 milliGRAM(s) IV Push every 8 hours  metoprolol tartrate 12.5 milliGRAM(s) Oral two times a day  mexiletine 200 milliGRAM(s) Oral every 8 hours  multivitamin 1 Tablet(s) Oral daily  nystatin Powder 1 Application(s) Topical two times a day  pantoprazole  Injectable 40 milliGRAM(s) IV Push daily  sodium chloride 0.9% lock flush 3 milliLiter(s) IV Push every 8 hours  trimethoprim   80 mG/sulfamethoxazole 400 mG 1 Tablet(s) Oral daily    Physical Therapy Rec:   Home  [  ]   Home w/ PT  [  ]  Rehab  [ x ]  Discussed with Cardiothoracic Team at AM rounds.

## 2022-11-21 NOTE — PROGRESS NOTE ADULT - SUBJECTIVE AND OBJECTIVE BOX
CHIEF COMPLAINT: f/up sob, chronic resp failure, TBM, severe persistent asthma, VC dysfunction, Type A aortic dissection s/p repair w/modified "Bentall procedure and hemiarch replacement 9/6- decannulated-on RA, ambulating and looking to rehab    Interval Events: ambulating, ID etc f/up    REVIEW OF SYSTEMS:  Constitutional: No fevers or chills. No weight loss. No fatigue or generalized malaise.  Eyes: No itching or discharge from the eyes  ENT: No ear pain. No ear discharge. No nasal congestion. No post nasal drip. No epistaxis. No throat pain. No sore throat. No difficulty swallowing.   CV: No chest pain. No palpitations. No lightheadedness or dizziness.   Resp: No dyspnea at rest. No dyspnea on exertion. No orthopnea. No wheezing. No cough. No stridor. No sputum production. No chest pain with respiration.  GI: No nausea. No vomiting. No diarrhea.  MSK: No joint pain or pain in any extremities  Integumentary: No skin lesions. No pedal edema.  Neurological: + gross motor weakness. No sensory changes.  [+ ] All other systems negative  [ ] Unable to assess ROS because ________    OBJECTIVE:  ICU Vital Signs Last 24 Hrs  T(C): 36.8 (20 Nov 2022 20:31), Max: 36.9 (20 Nov 2022 05:05)  T(F): 98.2 (20 Nov 2022 20:31), Max: 98.4 (20 Nov 2022 05:05)  HR: 78 (20 Nov 2022 20:31) (72 - 80)  BP: 142/85 (20 Nov 2022 20:31) (114/72 - 142/85)  BP(mean): 104 (20 Nov 2022 20:31) (86 - 104)  ABP: --  ABP(mean): --  RR: 18 (20 Nov 2022 20:31) (18 - 18)  SpO2: 97% (20 Nov 2022 20:31) (95% - 97%)    O2 Parameters below as of 20 Nov 2022 20:31  Patient On (Oxygen Delivery Method): room air              11-19 @ 07:01  -  11-20 @ 07:00  --------------------------------------------------------  IN: 360 mL / OUT: 601 mL / NET: -241 mL    11-20 @ 07:01  - 11-21 @ 04:54  --------------------------------------------------------  IN: 440 mL / OUT: 750 mL / NET: -310 mL      CAPILLARY BLOOD GLUCOSE      POCT Blood Glucose.: 111 mg/dL (20 Nov 2022 21:36)      PHYSICAL EXAM: NAD in bed on RA  General: Awake, alert, oriented X 3.   HEENT: Atraumatic, normocephalic.                 Mallampatti Grade 2                No nasal congestion.                No tonsillar or pharyngeal exudates.  Lymph Nodes: No palpable lymphadenopathy  Neck: No JVD. No carotid bruit.   Respiratory: Normal chest expansion                         Normal percussion                         Normal and equal air entry                         No wheeze, rhonchi or rales.  Cardiovascular: S1 S2 normal. No murmurs, rubs or gallops.   Abdomen: Soft, non-tender, non-distended. No organomegaly. Normoactive bowel sounds.  Extremities: Warm to touch. Peripheral pulse palpable. No pedal edema.   Skin: No rashes or skin lesions  Neurological: Motor and sensory examination equal and normal in all four extremities.  Psychiatry: Appropriate mood and affect.    HOSPITAL MEDICATIONS:  MEDICATIONS  (STANDING):  acetylcysteine 10%  Inhalation 4 milliLiter(s) Inhalation every 12 hours  albuterol/ipratropium for Nebulization 3 milliLiter(s) Nebulizer every 6 hours  apixaban 5 milliGRAM(s) Enteral Tube every 12 hours  ascorbic acid 500 milliGRAM(s) Oral daily  buDESOnide    Inhalation Suspension 0.5 milliGRAM(s) Inhalation every 12 hours  chlorhexidine 2% Cloths 1 Application(s) Topical <User Schedule>  collagenase Ointment 1 Application(s) Topical daily  dextrose 50% Injectable 25 Gram(s) IV Push once  dextrose 50% Injectable 12.5 Gram(s) IV Push once  dextrose 50% Injectable 25 Gram(s) IV Push once  dextrose Oral Gel 15 Gram(s) Oral once  diphenhydramine 2%/zinc acetate 0.1% Cream 1 Application(s) Topical every 8 hours  fluconAZOLE   Tablet 400 milliGRAM(s) Oral daily  glucagon  Injectable 1 milliGRAM(s) IntraMuscular once  hydrALAZINE 50 milliGRAM(s) Oral three times a day  influenza  Vaccine (FLUBLOK) 0.5 milliLiter(s) IntraMuscular once  insulin glargine Injectable (LANTUS) 4 Unit(s) SubCutaneous every morning  insulin lispro (ADMELOG) corrective regimen sliding scale   SubCutaneous <User Schedule>  insulin lispro (ADMELOG) corrective regimen sliding scale   SubCutaneous three times a day before meals  insulin lispro Injectable (ADMELOG) 4 Unit(s) SubCutaneous before breakfast  insulin lispro Injectable (ADMELOG) 3 Unit(s) SubCutaneous before lunch  insulin lispro Injectable (ADMELOG) 3 Unit(s) SubCutaneous before dinner  magnesium oxide 400 milliGRAM(s) Oral every 8 hours  methylPREDNISolone 8 milliGRAM(s) Oral daily  metoclopramide Injectable 5 milliGRAM(s) IV Push every 8 hours  metoprolol tartrate 12.5 milliGRAM(s) Oral two times a day  mexiletine 200 milliGRAM(s) Oral every 8 hours  multivitamin 1 Tablet(s) Oral daily  nystatin Powder 1 Application(s) Topical two times a day  pantoprazole  Injectable 40 milliGRAM(s) IV Push daily  sodium chloride 0.9% lock flush 3 milliLiter(s) IV Push every 8 hours  trimethoprim   80 mG/sulfamethoxazole 400 mG 1 Tablet(s) Oral daily    MEDICATIONS  (PRN):  acetaminophen    Suspension .. 650 milliGRAM(s) Oral every 6 hours PRN Temp greater or equal to 38C (100.4F), Mild Pain (1 - 3)  calamine/zinc oxide Lotion 1 Application(s) Topical every 8 hours PRN Itching      LABS:                        10.8   9.47  )-----------( 281      ( 19 Nov 2022 11:09 )             37.8     11-19    132<L>  |  98  |  17  ----------------------------<  374<H>  5.0   |  20<L>  |  0.54    Ca    9.4      19 Nov 2022 11:09  Phos  3.3     11-19  Mg     1.8     11-19                MICROBIOLOGY:     RADIOLOGY:  [ ] Reviewed and interpreted by me    Point of Care Ultrasound Findings:    PFT:    EKG:

## 2022-11-21 NOTE — PROGRESS NOTE ADULT - PROBLEM SELECTOR PROBLEM 2
Adrenal insufficiency
Hoarseness
Adrenal insufficiency
Type 2 diabetes mellitus
Adrenal insufficiency
Hypertension
Adrenal insufficiency
Type 2 diabetes mellitus
Adrenal insufficiency
Adrenal insufficiency
Hypertension
MRSA bacteremia
Type 2 diabetes mellitus
Adrenal insufficiency
Type 2 diabetes mellitus
Type 2 diabetes mellitus
Adrenal insufficiency
Type 2 diabetes mellitus
Adrenal insufficiency
Hypertension
Adrenal insufficiency
Adrenal insufficiency
Hypertension
Type 2 diabetes mellitus
Hypertension
Adrenal insufficiency
Hypertension
Type 2 diabetes mellitus

## 2022-11-21 NOTE — PROGRESS NOTE ADULT - PROBLEM SELECTOR PROBLEM 3
Hyperlipidemia
Adrenal insufficiency
Hyperlipidemia
Adrenal insufficiency
Hyperlipidemia
Atrial fibrillation
Atrial fibrillation
Hyperlipidemia
Atrial fibrillation
Hyperlipidemia
Atrial fibrillation
Hyperlipidemia
Atrial fibrillation
Atrial fibrillation
Hyperlipidemia
Adrenal insufficiency
Atrial fibrillation
Adrenal insufficiency

## 2022-11-21 NOTE — PROGRESS NOTE ADULT - PROVIDER SPECIALTY LIST ADULT
Electrophysiology
Pulmonology
Radiation Therapy Patient Education    Person involved with teaching: Patient    Patient educational needs for self management of treatment-related side effects assessment completed.  [facility] Patient Ed tab contains Patient Learning Assessment    Education Materials Given  Skin Care During Radiation Treatment, sim pamphlet and schedule    Educational Topics Discussed  Side effects expected, Skin care, Activity, Nutrition and weight loss and When to call MD/RN    Response To Teaching  Verbalizes understanding    Referrals sent: None    Chemotherapy?  No          
Hospitalist
Electrophysiology
Hospitalist

## 2022-11-21 NOTE — PROGRESS NOTE ADULT - PROBLEM SELECTOR PLAN 1
-test BG ac meals/hs and 6am daily  -c/w Lantus 4 units QAM daily at 6am  -Adjust Admelog 5-5-3 w/meals  -c/W Admelog low correction scale ac/hs  -Will adjust insulin as needed  -diet-change to consistent carb diet  Discharge plan:  - Likely to discharge patient on basal/bolus insulin. Final regimen pending clinical course.  - Recommend routine outpatient ophthalmology, podiatry  - Can f/u with endocrinologist Dr. Saavedra.  - Make sure pt has Rx for all DM supplies and insulin/ DM meds.  -Based on pt's clinical condition and mental status at this time she is not able to independently manage her DM. Will evaluate pt's ability to manage DM at time of discharge. If unable> pt will need family/ care giver (s) to manage DM care once at home  -Will need rehab.

## 2022-11-21 NOTE — PROGRESS NOTE ADULT - TIME BILLING
as above:  awaiting rehab, resp stable-speaking well, ambulating daily/stronger over all  -ID f/up-resp stable--wound care f/up-TC continues- (she will always have secretions) due to TBM-s/p  trach 10/10-s/p  resp failure-s/p ouvyqa-STCO-mp ABX-stable CV status-re- VEST rx in use  multifactorial dyspnea-resp failure-severe persistent asthma, TBM s/p tracheoplasty, s/p Aortic aneurysm repair, Bronchitis (proteus)-O2-keep 90% (RA/NC)  severe persistent asthma--medrol 8mg, singulair 10, duoneb q 6, budes .5 bid, tezspire 8/29-was due 9/29-next upon DC  TBM-s/p tracheoplasty--accapella, vest rx, mucomyst here 2 cc 10% q 8 (secretions)  s/p Aortic aneurysm repair--as per CTS staff care  AF-on eliquis rx               CV-improved hydralazine/lopressor/mexilitine   DVT R-IJ-on oral A/C  *****ID-s/p proteus bronchitis-s/p meropenum changed to ceftriaxone and back to meropenem/vanco- off of ABX as of 9/19--restart of ABX 9/27-meropenem/vanco-s/p  meropenem s/p vanco for MRSE sepsis x 6 wks, plastics/ortho for elbow-proteus (?wash out)-vac in place-suppressive rx-bact/fluconazole as of 11/15  PT-OOB as able                             GI-TF as able--f/up LFTs-normal-NGT out-eating      ****ORTHO f/up--? additional wash out of elbow wound?; wound care f/up  **VC dysfunction--aspiration precautions-s/p trach 10/10-ENT f/up s/p laryngoscopy- decannulated  swallow issues-may need new dentures  **right shoulder issues-? ortho re-consult  Heme onc f/up colon ca  prog-improving                PT-to continue-more OOB       ***DC planning (flu shot/shingles etc.) to rehab    Eulalio Allison MD-Pulmonary   [phone]

## 2022-11-21 NOTE — PROGRESS NOTE ADULT - PROBLEM SELECTOR PLAN 4
on chronic steroids at home: medrol 8mg qd   while admitted on prednisone 8mg po qd  do not discontinue steroids abruptly , if pt made npo or refuses prednisone will need IV steroid dose  if need to transition to IV would order methylprednisone 6 mg IV qd if stable d/w Dr Stevenson on 9/15      Discussed with patient/CT surgery team  Can be reached via TEAMS/pager: [phone]   office:  [phone] (M-F 9a-5pm)               [phone] (nights/weekends)   [email or web address] password [email or web address]
recent OM of the R foot s/b debridement and completed course of Vancomycin/Ertapenem for MRSA/ESBL Proteus/Corynebacterium
multifactorial dyspnea-resp failure-severe persistent asthma, TBM s/p tracheoplasty, s/p Aortic aneurysm repair, Bronchitis (proteus)-O2 NC-keep 90%  severe persistent asthma--medrol 8mg, singulair 10, duoneb q 6, budes .5 bid, incentive spirometry, tezspire 8/29-next 9/29
On chronic steroids at home: medrol 8mg qd   -C/w prednisone 8mg po qd per primary team  -Do not discontinue steroids abruptly , if pt made npo or refuses prednisone will need IV steroid dose  if need to transition to IV would order methylprednisone 6 mg IV qd if stable as d/w Dr Stevenson on 9/15  -Contact endo with any questions.
On chronic steroids at home: medrol 8mg qd   -C/w prednisone 8mg po qd per primary team  -Do not discontinue steroids abruptly , if pt made npo or refuses prednisone will need IV steroid dose  if need to transition to IV would order methylprednisone 6 mg IV qd if stable as d/w Dr Stevenson on 9/15  -Contact endo with any questions.
multifactorial dyspnea- resp failure-severe persistent asthma, TBM s/p tracheoplasty, s/p Aortic aneurysm repair, Bronchitis (proteus)-O2 NC-keep 90%  severe persistent asthma--medrol 8mg, singular 10, Duoneb q 6, budes .5 bid, incentive spirometry, tezspire 8/29-next 9/29
On chronic steroids at home: medrol 8mg qd   -C/w prednisone 8mg po qd per primary team  -Do not discontinue steroids abruptly , if pt made npo or refuses prednisone will need IV steroid dose  if need to transition to IV would order methylprednisone 6 mg IV qd if stable as d/w Dr Stevenson on 9/15  -Contact endo with any questions.      discussed plan w/pt and team  Can be reached via TEAMS/pager: [phone]   office:  [phone] (M-F 9a-5pm)               [phone] (nights/weekends)   [email or web address] password [email or web address]
recent OM of the R foot s/b debridement and completed course of Vancomycin/Ertapenem for MRSA/ESBL Proteus/Corynebacterium
On chronic steroids at home: medrol 8mg qd   -C/w prednisone 8mg po qd per primary team  -Do not discontinue steroids abruptly , if pt made npo or refuses prednisone will need IV steroid dose  if need to transition to IV would order methylprednisone 6 mg IV qd if stable as d/w Dr Stevenson on 9/15  -Contact endo with any questions.
recent OM of the R foot s/b debridement and completed course of Vancomycin/Ertapenem for MRSA/ESBL Proteus/Corynebacterium
multifactorial dyspnea-resp failure-severe persistent asthma, TBM s/p tracheoplasty, s/p Aortic aneurysm repair, Bronchitis (proteus)-O2 NC-keep 90%  severe persistent asthma--medrol 8mg, singulair 10, duoneb q 6, budes .5 bid, incentive spirometry, tezspire 8/29-next 9/29
recent OM of the R foot s/b debridement and completed course of Vancomycin/Ertapenem for MRSA/ESBL Proteus/Corynebacterium
On chronic steroids at home: medrol 8mg qd   -C/w prednisone 8mg po qd per primary team  -Do not discontinue steroids abruptly , if pt made npo or refuses prednisone will need IV steroid dose  if need to transition to IV would order methylprednisone 6 mg IV qd if stable as d/w Dr Stevenson on 9/15  -Contact endo with any questions.
recent OM of the R foot s/b debridement and completed course of Vancomycin/Ertapenem for MRSA/ESBL Proteus/Corynebacterium
multifactorial dyspnea- resp failure-severe persistent asthma, TBM s/p tracheoplasty, s/p Aortic aneurysm repair, Bronchitis (proteus)-O2 NC-keep 90%  severe persistent asthma--medrol 8mg, singular 10, Duoneb q 6, budes .5 bid, incentive spirometry, tezspire 8/29-next 9/29

## 2022-11-21 NOTE — PROGRESS NOTE ADULT - PROBLEM SELECTOR PLAN 3
Off rosuvastatin 5mg daily  Re start if not contraindicated and as per cardiology  -F/u levels as out pt.    discussed w/pt and CT surgery team  Can be reached via TEAMS/pager: [phone]   office:  [phone] (M-F 9a-5pm)               [phone] (nights/weekends)   [email or web address] password [email or web address].

## 2022-11-21 NOTE — PROGRESS NOTE ADULT - ASSESSMENT
71 year-old female with a history of DM2, CAD, A-Fib on apixaban, Severe Persistent Asthma (on chronic steroids, recently started on Tezspire), colon cancer s/p resection/chemo, and tracheobronchomalacia s/p tracheoplasty, and recent OM of the R foot s/b debridement and completed course of Vancomycin/Ertapenem for MRSA/ESBL Proteus/Corynebacterium who now presents with chest pain, found to have Type A dissection s/p repair with modified Bentall procedure and hemiarch replacement on 22. Post-op course complicated by acute hypoxemic respiratory failure, shock, anemia, and hyperglycemia requiring insulin gtt. Extubated , now awake and alert with mild encephalopathy but overall significantly improved.    Assessment:  Acute hypoxemic respiratory failure requiring intubation  Type A Aortic Dissection  Severe Persistent Asthma  History of Tracheobronchomalacia    Plan:  See below:  -**************************                                SEE Below:  -improving but weakness  9/15-ABX adjusted to ceftriaxone (LFTS)-PICU  -PICU, low grade temp-fever work up in progress, no resp decline or sx  -resp stable at present but tenuous  -for FEESST today, NGT in place w/TF  -s/p FEESST-passed, remains in PICU          -TF in place at 40cc, more OOB          -hypoglycemia noted and rx, no change in resp status  -vented/sedated-pressors/inotropes/ABX  -remains vented on nitrous/less O2 needs, improving LFTS                   -vented/semi sedated--less O2 needs  10/3-on vanco, weaning sedation/, all lines changed  10/6-no changes-now afebrile-on vanco   10/7-no weaning-remains off pressors  10/10-for trach, no weaning done               10/11-s/p trach-uneventful  10/12-TC and ;cpap done and tolerated well  10/13-weaning TC continues              10/14-mucus issues-TC continues  10/24-TC x mult days-stable-secretion issues remain           10/25-replaced on vent for secretions            10/26-TC in place, mult consults  10/27-no changes-TC continues    10/28-wound care-vac in place  10/31-TC in place, elbow wound vac in place  -resp stable, elbow vac changed--ABX as per ID     -no new issues over the day  11/3-no new events-on TC/OOB as able; ENT f/up for awake laryngoscopy  -ENT f/up, vest use-feels well                                  -no new events--await decannulation   -phonating well, wound vac right arm in place  -awaiting floor bed-board CICU                    11/10-CTU-vanco until -no new events                     -continued ambulation, TC remains  -stable over the day  -decannulated and phonating well, wants rehab  11/15-VSS no acute issues overnight, await swallow eval   VSS; RSR 70-80; npo w/ koafeed tf's; mbs today; diet advanced to puree diet w/ mild thick liquids; noctural tf; insulin adjusted as per H. endo as per diet changes; dental consult- pt removed own dentures 2 days ago and now c/o rt upper jaw/ mouth pain-->Traumatic ulcer due to denture prosthesis- salt water rinses recommended;   rt elbow vac changed today; abx as per ID- pt will need chronic suppression as per ID; bactrim and diflucan; d/c IV vanco today d/w ID    VSS - pt tolerating puree diet w/ moderately thickened liquids  will d/c nocturnal feeds & kaofeed   VSS; rsr 70-90; rt elbow vac change today; tolerating puree diet; ck covid pcr sun; rehab placement mon/ e next week    VSS: RSR ;  tolerating puree diet; ck covid pcr sun; rehab placement mon/ tue next week     d/c planning   to rehab   VSS vac d/c'd from right elbow - as wound granulated - aquacel w/ aleven applied by PT - PT will remove on Wednesday to assess drainage- d/c to rehab tomorrow

## 2022-11-22 ENCOUNTER — TRANSCRIPTION ENCOUNTER (OUTPATIENT)
Age: 72
End: 2022-11-22

## 2022-11-22 VITALS — HEART RATE: 75 BPM

## 2022-11-22 LAB
ALBUMIN SERPL ELPH-MCNC: 3.4 G/DL — SIGNIFICANT CHANGE UP (ref 3.3–5)
ALP SERPL-CCNC: 115 U/L — SIGNIFICANT CHANGE UP (ref 40–120)
ALT FLD-CCNC: 26 U/L — SIGNIFICANT CHANGE UP (ref 10–45)
ANION GAP SERPL CALC-SCNC: 13 MMOL/L — SIGNIFICANT CHANGE UP (ref 5–17)
ANION GAP SERPL CALC-SCNC: 13 MMOL/L — SIGNIFICANT CHANGE UP (ref 5–17)
AST SERPL-CCNC: 41 U/L — HIGH (ref 10–40)
BASE EXCESS BLDV CALC-SCNC: 0.6 MMOL/L — SIGNIFICANT CHANGE UP (ref -2–3)
BILIRUB SERPL-MCNC: 0.3 MG/DL — SIGNIFICANT CHANGE UP (ref 0.2–1.2)
BLOOD GAS VENOUS - CREATININE: SIGNIFICANT CHANGE UP MG/DL (ref 0.5–1.3)
BUN SERPL-MCNC: 13 MG/DL — SIGNIFICANT CHANGE UP (ref 7–23)
BUN SERPL-MCNC: 13 MG/DL — SIGNIFICANT CHANGE UP (ref 7–23)
CA-I SERPL-SCNC: 1.32 MMOL/L — SIGNIFICANT CHANGE UP (ref 1.15–1.33)
CALCIUM SERPL-MCNC: 9.6 MG/DL — SIGNIFICANT CHANGE UP (ref 8.4–10.5)
CALCIUM SERPL-MCNC: 9.8 MG/DL — SIGNIFICANT CHANGE UP (ref 8.4–10.5)
CHLORIDE BLDV-SCNC: 101 MMOL/L — SIGNIFICANT CHANGE UP (ref 96–108)
CHLORIDE SERPL-SCNC: 100 MMOL/L — SIGNIFICANT CHANGE UP (ref 96–108)
CHLORIDE SERPL-SCNC: 101 MMOL/L — SIGNIFICANT CHANGE UP (ref 96–108)
CO2 BLDV-SCNC: 27 MMOL/L — HIGH (ref 22–26)
CO2 SERPL-SCNC: 22 MMOL/L — SIGNIFICANT CHANGE UP (ref 22–31)
CO2 SERPL-SCNC: 23 MMOL/L — SIGNIFICANT CHANGE UP (ref 22–31)
CREAT SERPL-MCNC: 0.57 MG/DL — SIGNIFICANT CHANGE UP (ref 0.5–1.3)
CREAT SERPL-MCNC: 0.58 MG/DL — SIGNIFICANT CHANGE UP (ref 0.5–1.3)
EGFR: 95 ML/MIN/1.73M2 — SIGNIFICANT CHANGE UP
EGFR: 95 ML/MIN/1.73M2 — SIGNIFICANT CHANGE UP
GAS PNL BLDV: 136 MMOL/L — SIGNIFICANT CHANGE UP (ref 136–145)
GAS PNL BLDV: SIGNIFICANT CHANGE UP
GAS PNL BLDV: SIGNIFICANT CHANGE UP
GLUCOSE BLDC GLUCOMTR-MCNC: 120 MG/DL — HIGH (ref 70–99)
GLUCOSE BLDC GLUCOMTR-MCNC: 153 MG/DL — HIGH (ref 70–99)
GLUCOSE BLDC GLUCOMTR-MCNC: 225 MG/DL — HIGH (ref 70–99)
GLUCOSE BLDV-MCNC: 117 MG/DL — HIGH (ref 70–99)
GLUCOSE SERPL-MCNC: 156 MG/DL — HIGH (ref 70–99)
GLUCOSE SERPL-MCNC: 266 MG/DL — HIGH (ref 70–99)
HCO3 BLDV-SCNC: 26 MMOL/L — SIGNIFICANT CHANGE UP (ref 22–29)
HCT VFR BLD CALC: 38.3 % — SIGNIFICANT CHANGE UP (ref 34.5–45)
HCT VFR BLDA CALC: 37 % — SIGNIFICANT CHANGE UP (ref 34.5–46.5)
HGB BLD CALC-MCNC: 12.3 G/DL — SIGNIFICANT CHANGE UP (ref 11.7–16.1)
HGB BLD-MCNC: 11.4 G/DL — LOW (ref 11.5–15.5)
LACTATE BLDV-MCNC: 4.4 MMOL/L — CRITICAL HIGH (ref 0.5–2)
MAGNESIUM SERPL-MCNC: 2 MG/DL — SIGNIFICANT CHANGE UP (ref 1.6–2.6)
MCHC RBC-ENTMCNC: 22.9 PG — LOW (ref 27–34)
MCHC RBC-ENTMCNC: 29.8 GM/DL — LOW (ref 32–36)
MCV RBC AUTO: 77.1 FL — LOW (ref 80–100)
NRBC # BLD: 0 /100 WBCS — SIGNIFICANT CHANGE UP (ref 0–0)
PCO2 BLDV: 44 MMHG — HIGH (ref 39–42)
PH BLDV: 7.38 — SIGNIFICANT CHANGE UP (ref 7.32–7.43)
PHOSPHATE SERPL-MCNC: 4.1 MG/DL — SIGNIFICANT CHANGE UP (ref 2.5–4.5)
PLATELET # BLD AUTO: 310 K/UL — SIGNIFICANT CHANGE UP (ref 150–400)
PO2 BLDV: 38 MMHG — SIGNIFICANT CHANGE UP (ref 25–45)
POTASSIUM BLDV-SCNC: 5 MMOL/L — SIGNIFICANT CHANGE UP (ref 3.5–5.1)
POTASSIUM SERPL-MCNC: 5.5 MMOL/L — HIGH (ref 3.5–5.3)
POTASSIUM SERPL-MCNC: 6.7 MMOL/L — CRITICAL HIGH (ref 3.5–5.3)
POTASSIUM SERPL-SCNC: 5.5 MMOL/L — HIGH (ref 3.5–5.3)
POTASSIUM SERPL-SCNC: 6.7 MMOL/L — CRITICAL HIGH (ref 3.5–5.3)
PROT SERPL-MCNC: 7.5 G/DL — SIGNIFICANT CHANGE UP (ref 6–8.3)
RBC # BLD: 4.97 M/UL — SIGNIFICANT CHANGE UP (ref 3.8–5.2)
RBC # FLD: 19.8 % — HIGH (ref 10.3–14.5)
SAO2 % BLDV: 57.5 % — LOW (ref 67–88)
SODIUM SERPL-SCNC: 136 MMOL/L — SIGNIFICANT CHANGE UP (ref 135–145)
SODIUM SERPL-SCNC: 136 MMOL/L — SIGNIFICANT CHANGE UP (ref 135–145)
WBC # BLD: 9.59 K/UL — SIGNIFICANT CHANGE UP (ref 3.8–10.5)
WBC # FLD AUTO: 9.59 K/UL — SIGNIFICANT CHANGE UP (ref 3.8–10.5)

## 2022-11-22 PROCEDURE — 76705 ECHO EXAM OF ABDOMEN: CPT

## 2022-11-22 PROCEDURE — 77001 FLUOROGUIDE FOR VEIN DEVICE: CPT

## 2022-11-22 PROCEDURE — 74230 X-RAY XM SWLNG FUNCJ C+: CPT

## 2022-11-22 PROCEDURE — 36415 COLL VENOUS BLD VENIPUNCTURE: CPT

## 2022-11-22 PROCEDURE — 87075 CULTR BACTERIA EXCEPT BLOOD: CPT

## 2022-11-22 PROCEDURE — 82803 BLOOD GASES ANY COMBINATION: CPT

## 2022-11-22 PROCEDURE — 97162 PT EVAL MOD COMPLEX 30 MIN: CPT

## 2022-11-22 PROCEDURE — 73070 X-RAY EXAM OF ELBOW: CPT

## 2022-11-22 PROCEDURE — C1892: CPT

## 2022-11-22 PROCEDURE — 80202 ASSAY OF VANCOMYCIN: CPT

## 2022-11-22 PROCEDURE — 85730 THROMBOPLASTIN TIME PARTIAL: CPT

## 2022-11-22 PROCEDURE — 84443 ASSAY THYROID STIM HORMONE: CPT

## 2022-11-22 PROCEDURE — 80048 BASIC METABOLIC PNL TOTAL CA: CPT

## 2022-11-22 PROCEDURE — P9047: CPT

## 2022-11-22 PROCEDURE — 97530 THERAPEUTIC ACTIVITIES: CPT

## 2022-11-22 PROCEDURE — 87389 HIV-1 AG W/HIV-1&-2 AB AG IA: CPT

## 2022-11-22 PROCEDURE — 83690 ASSAY OF LIPASE: CPT

## 2022-11-22 PROCEDURE — C9399: CPT

## 2022-11-22 PROCEDURE — 80162 ASSAY OF DIGOXIN TOTAL: CPT

## 2022-11-22 PROCEDURE — 93321 DOPPLER ECHO F-UP/LMTD STD: CPT

## 2022-11-22 PROCEDURE — 92611 MOTION FLUOROSCOPY/SWALLOW: CPT

## 2022-11-22 PROCEDURE — 36430 TRANSFUSION BLD/BLD COMPNT: CPT

## 2022-11-22 PROCEDURE — 71275 CT ANGIOGRAPHY CHEST: CPT

## 2022-11-22 PROCEDURE — 93306 TTE W/DOPPLER COMPLETE: CPT

## 2022-11-22 PROCEDURE — 76882 US LMTD JT/FCL EVL NVASC XTR: CPT

## 2022-11-22 PROCEDURE — 10160 PNXR ASPIR ABSC HMTMA BULLA: CPT

## 2022-11-22 PROCEDURE — 86780 TREPONEMA PALLIDUM: CPT

## 2022-11-22 PROCEDURE — C1769: CPT

## 2022-11-22 PROCEDURE — C8929: CPT

## 2022-11-22 PROCEDURE — 94640 AIRWAY INHALATION TREATMENT: CPT

## 2022-11-22 PROCEDURE — 86923 COMPATIBILITY TEST ELECTRIC: CPT

## 2022-11-22 PROCEDURE — 85379 FIBRIN DEGRADATION QUANT: CPT

## 2022-11-22 PROCEDURE — 82150 ASSAY OF AMYLASE: CPT

## 2022-11-22 PROCEDURE — 97164 PT RE-EVAL EST PLAN CARE: CPT

## 2022-11-22 PROCEDURE — 97116 GAIT TRAINING THERAPY: CPT

## 2022-11-22 PROCEDURE — 87340 HEPATITIS B SURFACE AG IA: CPT

## 2022-11-22 PROCEDURE — 87040 BLOOD CULTURE FOR BACTERIA: CPT

## 2022-11-22 PROCEDURE — 86703 HIV-1/HIV-2 1 RESULT ANTBDY: CPT

## 2022-11-22 PROCEDURE — 93308 TTE F-UP OR LMTD: CPT

## 2022-11-22 PROCEDURE — 87077 CULTURE AEROBIC IDENTIFY: CPT

## 2022-11-22 PROCEDURE — 87640 STAPH A DNA AMP PROBE: CPT

## 2022-11-22 PROCEDURE — 87186 SC STD MICRODIL/AGAR DIL: CPT

## 2022-11-22 PROCEDURE — 83605 ASSAY OF LACTIC ACID: CPT

## 2022-11-22 PROCEDURE — 85018 HEMOGLOBIN: CPT

## 2022-11-22 PROCEDURE — 85520 HEPARIN ASSAY: CPT

## 2022-11-22 PROCEDURE — 82435 ASSAY OF BLOOD CHLORIDE: CPT

## 2022-11-22 PROCEDURE — 86706 HEP B SURFACE ANTIBODY: CPT

## 2022-11-22 PROCEDURE — 85384 FIBRINOGEN ACTIVITY: CPT

## 2022-11-22 PROCEDURE — 86850 RBC ANTIBODY SCREEN: CPT

## 2022-11-22 PROCEDURE — 85025 COMPLETE CBC W/AUTO DIFF WBC: CPT

## 2022-11-22 PROCEDURE — 87205 SMEAR GRAM STAIN: CPT

## 2022-11-22 PROCEDURE — 92597 ORAL SPEECH DEVICE EVAL: CPT

## 2022-11-22 PROCEDURE — 80053 COMPREHEN METABOLIC PANEL: CPT

## 2022-11-22 PROCEDURE — 87086 URINE CULTURE/COLONY COUNT: CPT

## 2022-11-22 PROCEDURE — 84132 ASSAY OF SERUM POTASSIUM: CPT

## 2022-11-22 PROCEDURE — 93005 ELECTROCARDIOGRAM TRACING: CPT

## 2022-11-22 PROCEDURE — U0005: CPT

## 2022-11-22 PROCEDURE — 97110 THERAPEUTIC EXERCISES: CPT

## 2022-11-22 PROCEDURE — 82947 ASSAY GLUCOSE BLOOD QUANT: CPT

## 2022-11-22 PROCEDURE — U0003: CPT

## 2022-11-22 PROCEDURE — 86140 C-REACTIVE PROTEIN: CPT

## 2022-11-22 PROCEDURE — 83735 ASSAY OF MAGNESIUM: CPT

## 2022-11-22 PROCEDURE — 82330 ASSAY OF CALCIUM: CPT

## 2022-11-22 PROCEDURE — 85610 PROTHROMBIN TIME: CPT

## 2022-11-22 PROCEDURE — 94002 VENT MGMT INPAT INIT DAY: CPT

## 2022-11-22 PROCEDURE — 99232 SBSQ HOSP IP/OBS MODERATE 35: CPT

## 2022-11-22 PROCEDURE — 31720 CLEARANCE OF AIRWAYS: CPT

## 2022-11-22 PROCEDURE — 82962 GLUCOSE BLOOD TEST: CPT

## 2022-11-22 PROCEDURE — 86900 BLOOD TYPING SEROLOGIC ABO: CPT

## 2022-11-22 PROCEDURE — 84134 ASSAY OF PREALBUMIN: CPT

## 2022-11-22 PROCEDURE — 86592 SYPHILIS TEST NON-TREP QUAL: CPT

## 2022-11-22 PROCEDURE — 81001 URINALYSIS AUTO W/SCOPE: CPT

## 2022-11-22 PROCEDURE — 85396 CLOTTING ASSAY WHOLE BLOOD: CPT

## 2022-11-22 PROCEDURE — 85027 COMPLETE CBC AUTOMATED: CPT

## 2022-11-22 PROCEDURE — 92526 ORAL FUNCTION THERAPY: CPT

## 2022-11-22 PROCEDURE — 85652 RBC SED RATE AUTOMATED: CPT

## 2022-11-22 PROCEDURE — 86704 HEP B CORE ANTIBODY TOTAL: CPT

## 2022-11-22 PROCEDURE — 87522 HEPATITIS C REVRS TRNSCRPJ: CPT

## 2022-11-22 PROCEDURE — 83036 HEMOGLOBIN GLYCOSYLATED A1C: CPT

## 2022-11-22 PROCEDURE — P9045: CPT

## 2022-11-22 PROCEDURE — 92610 EVALUATE SWALLOWING FUNCTION: CPT

## 2022-11-22 PROCEDURE — 86022 PLATELET ANTIBODIES: CPT

## 2022-11-22 PROCEDURE — 82565 ASSAY OF CREATININE: CPT

## 2022-11-22 PROCEDURE — P9016: CPT

## 2022-11-22 PROCEDURE — 87536 HIV-1 QUANT&REVRSE TRNSCRPJ: CPT

## 2022-11-22 PROCEDURE — 97602 WOUND(S) CARE NON-SELECTIVE: CPT

## 2022-11-22 PROCEDURE — 84100 ASSAY OF PHOSPHORUS: CPT

## 2022-11-22 PROCEDURE — C1729: CPT

## 2022-11-22 PROCEDURE — 85014 HEMATOCRIT: CPT

## 2022-11-22 PROCEDURE — 74018 RADEX ABDOMEN 1 VIEW: CPT

## 2022-11-22 PROCEDURE — 86901 BLOOD TYPING SEROLOGIC RH(D): CPT

## 2022-11-22 PROCEDURE — 93971 EXTREMITY STUDY: CPT

## 2022-11-22 PROCEDURE — 94003 VENT MGMT INPAT SUBQ DAY: CPT

## 2022-11-22 PROCEDURE — 71045 X-RAY EXAM CHEST 1 VIEW: CPT

## 2022-11-22 PROCEDURE — 87070 CULTURE OTHR SPECIMN AEROBIC: CPT

## 2022-11-22 PROCEDURE — 87150 DNA/RNA AMPLIFIED PROBE: CPT

## 2022-11-22 PROCEDURE — 87641 MR-STAPH DNA AMP PROBE: CPT

## 2022-11-22 PROCEDURE — 92507 TX SP LANG VOICE COMM INDIV: CPT

## 2022-11-22 PROCEDURE — 36590 REMOVAL TUNNELED CV CATH: CPT

## 2022-11-22 PROCEDURE — L8699: CPT

## 2022-11-22 PROCEDURE — 84295 ASSAY OF SERUM SODIUM: CPT

## 2022-11-22 PROCEDURE — 94799 UNLISTED PULMONARY SVC/PX: CPT

## 2022-11-22 PROCEDURE — 93970 EXTREMITY STUDY: CPT

## 2022-11-22 PROCEDURE — 97605 NEG PRS WND THER DME<=50SQCM: CPT

## 2022-11-22 PROCEDURE — 86803 HEPATITIS C AB TEST: CPT

## 2022-11-22 PROCEDURE — 76942 ECHO GUIDE FOR BIOPSY: CPT

## 2022-11-22 PROCEDURE — 74174 CTA ABD&PLVS W/CONTRAST: CPT

## 2022-11-22 RX ORDER — TIOTROPIUM BROMIDE 18 UG/1
1 CAPSULE ORAL; RESPIRATORY (INHALATION)
Qty: 0 | Refills: 0 | DISCHARGE

## 2022-11-22 RX ORDER — METOPROLOL TARTRATE 50 MG
12.5 TABLET ORAL
Qty: 0 | Refills: 0 | DISCHARGE
Start: 2022-11-22

## 2022-11-22 RX ORDER — COLLAGENASE CLOSTRIDIUM HIST. 250 UNIT/G
1 OINTMENT (GRAM) TOPICAL
Qty: 0 | Refills: 0 | DISCHARGE
Start: 2022-11-22

## 2022-11-22 RX ORDER — CALAMINE AND ZINC OXIDE AND PHENOL 160; 10 MG/ML; MG/ML
1 LOTION TOPICAL
Qty: 0 | Refills: 0 | DISCHARGE
Start: 2022-11-22

## 2022-11-22 RX ORDER — ASCORBIC ACID 60 MG
1 TABLET,CHEWABLE ORAL
Qty: 0 | Refills: 0 | DISCHARGE
Start: 2022-11-22

## 2022-11-22 RX ORDER — MEXILETINE HYDROCHLORIDE 150 MG/1
1 CAPSULE ORAL
Qty: 0 | Refills: 0 | DISCHARGE
Start: 2022-11-22

## 2022-11-22 RX ORDER — INSULIN GLARGINE 100 [IU]/ML
18 INJECTION, SOLUTION SUBCUTANEOUS
Qty: 0 | Refills: 0 | DISCHARGE
Start: 2022-11-22

## 2022-11-22 RX ORDER — IPRATROPIUM/ALBUTEROL SULFATE 18-103MCG
3 AEROSOL WITH ADAPTER (GRAM) INHALATION
Qty: 0 | Refills: 0 | DISCHARGE
Start: 2022-11-22

## 2022-11-22 RX ORDER — APIXABAN 2.5 MG/1
1 TABLET, FILM COATED ORAL
Qty: 0 | Refills: 0 | DISCHARGE
Start: 2022-11-22

## 2022-11-22 RX ORDER — INSULIN LISPRO 100/ML
7 VIAL (ML) SUBCUTANEOUS
Qty: 0 | Refills: 0 | DISCHARGE
Start: 2022-11-22

## 2022-11-22 RX ORDER — FLUCONAZOLE 150 MG/1
2 TABLET ORAL
Qty: 0 | Refills: 0 | DISCHARGE
Start: 2022-11-22

## 2022-11-22 RX ORDER — NYSTATIN CREAM 100000 [USP'U]/G
1 CREAM TOPICAL
Qty: 0 | Refills: 0 | DISCHARGE
Start: 2022-11-22

## 2022-11-22 RX ORDER — ACETAMINOPHEN 500 MG
20.31 TABLET ORAL
Qty: 0 | Refills: 0 | DISCHARGE
Start: 2022-11-22

## 2022-11-22 RX ORDER — MAGNESIUM OXIDE 400 MG ORAL TABLET 241.3 MG
1 TABLET ORAL
Qty: 0 | Refills: 0 | DISCHARGE
Start: 2022-11-22

## 2022-11-22 RX ORDER — INSULIN GLARGINE 100 [IU]/ML
8 INJECTION, SOLUTION SUBCUTANEOUS
Qty: 0 | Refills: 0 | DISCHARGE
Start: 2022-11-22

## 2022-11-22 RX ORDER — ACETYLCYSTEINE 200 MG/ML
4 VIAL (ML) MISCELLANEOUS
Qty: 0 | Refills: 0 | DISCHARGE
Start: 2022-11-22

## 2022-11-22 RX ORDER — DIPHENHYDRAMINE HCL/ZINC ACET 2 %-0.1 %
1 CREAM (GRAM) TOPICAL
Qty: 0 | Refills: 0 | DISCHARGE
Start: 2022-11-22

## 2022-11-22 RX ORDER — HYDRALAZINE HCL 50 MG
1 TABLET ORAL
Qty: 0 | Refills: 0 | DISCHARGE
Start: 2022-11-22

## 2022-11-22 RX ORDER — BUDESONIDE, MICRONIZED 100 %
0.5 POWDER (GRAM) MISCELLANEOUS
Qty: 0 | Refills: 0 | DISCHARGE
Start: 2022-11-22

## 2022-11-22 RX ADMIN — Medication 1 TABLET(S): at 12:30

## 2022-11-22 RX ADMIN — Medication 3 MILLILITER(S): at 08:47

## 2022-11-22 RX ADMIN — NYSTATIN CREAM 1 APPLICATION(S): 100000 CREAM TOPICAL at 10:00

## 2022-11-22 RX ADMIN — INSULIN GLARGINE 4 UNIT(S): 100 INJECTION, SOLUTION SUBCUTANEOUS at 08:45

## 2022-11-22 RX ADMIN — Medication 2: at 08:43

## 2022-11-22 RX ADMIN — Medication 50 MILLIGRAM(S): at 06:39

## 2022-11-22 RX ADMIN — SODIUM CHLORIDE 3 MILLILITER(S): 9 INJECTION INTRAMUSCULAR; INTRAVENOUS; SUBCUTANEOUS at 05:14

## 2022-11-22 RX ADMIN — Medication 12.5 MILLIGRAM(S): at 06:38

## 2022-11-22 RX ADMIN — APIXABAN 5 MILLIGRAM(S): 2.5 TABLET, FILM COATED ORAL at 06:39

## 2022-11-22 RX ADMIN — Medication 4 MILLILITER(S): at 08:48

## 2022-11-22 RX ADMIN — Medication 0.5 MILLIGRAM(S): at 08:47

## 2022-11-22 RX ADMIN — MAGNESIUM OXIDE 400 MG ORAL TABLET 400 MILLIGRAM(S): 241.3 TABLET ORAL at 06:39

## 2022-11-22 RX ADMIN — MEXILETINE HYDROCHLORIDE 200 MILLIGRAM(S): 150 CAPSULE ORAL at 06:39

## 2022-11-22 RX ADMIN — Medication 3 MILLILITER(S): at 12:30

## 2022-11-22 RX ADMIN — FLUCONAZOLE 400 MILLIGRAM(S): 150 TABLET ORAL at 12:29

## 2022-11-22 RX ADMIN — Medication 8 MILLIGRAM(S): at 06:39

## 2022-11-22 RX ADMIN — Medication 5 MILLIGRAM(S): at 06:49

## 2022-11-22 RX ADMIN — Medication 5 UNIT(S): at 08:43

## 2022-11-22 RX ADMIN — CHLORHEXIDINE GLUCONATE 1 APPLICATION(S): 213 SOLUTION TOPICAL at 10:36

## 2022-11-22 RX ADMIN — Medication 1 APPLICATION(S): at 11:58

## 2022-11-22 NOTE — PROGRESS NOTE ADULT - TIME-BASED BILLING (NON-CRITICAL CARE)
Time-based billing (NON-critical care)

## 2022-11-22 NOTE — PROGRESS NOTE ADULT - PROVIDER SPECIALTY LIST ADULT
CT Surgery
CT Surgery
Critical Care
ENT
Endocrinology
Infectious Disease
Plastic Surgery
Podiatry
Pulmonology
CT Surgery
CT Surgery
Critical Care
ENT
Endocrinology
Endocrinology
Infectious Disease
Podiatry
Pulmonology
CT Surgery
CTU
Critical Care
ENT
Endocrinology
Infectious Disease
Podiatry
Pulmonology
Transplant ID
Wound Care
Critical Care
Endocrinology
Infectious Disease
Plastic Surgery
Podiatry
Podiatry
Pulmonology
Transplant ID
ENT
ENT
Endocrinology
Pulmonology
CT Surgery
ENT
ENT
Endocrinology
Pulmonology
ENT
Endocrinology
Pulmonology
Endocrinology
CT Surgery
Endocrinology
CT Surgery
CT Surgery
Critical Care
Endocrinology
CT Surgery
Endocrinology
CT Surgery
Endocrinology
CT Surgery
Endocrinology
CT Surgery
CT Surgery

## 2022-11-22 NOTE — PROGRESS NOTE ADULT - TIME BILLING
as above:  DC planning in progress-awaiting rehab, resp stable-speaking well, ambulating daily/stronger over all-less shoulder pain  -ID f/up-resp stable--wound care f/up-TC continues- (she will always have secretions) due to TBM-s/p  trach 10/10-s/p  resp failure-s/p rnsqna-THBZ-da ABX-stable CV status-re- VEST rx in use  multifactorial dyspnea-resp failure-severe persistent asthma, TBM s/p tracheoplasty, s/p Aortic aneurysm repair, Bronchitis (proteus)-O2-keep 90% (RA/NC)  severe persistent asthma--medrol 8mg, singulair 10, duoneb q 6, budes .5 bid, tezspire 8/29-was due 9/29-next upon DC  TBM-s/p tracheoplasty--accapella, vest rx, mucomyst here 2 cc 10% q 8 (secretions)  s/p Aortic aneurysm repair--as per CTS staff care  AF-on eliquis rx               CV-improved hydralazine/lopressor/mexilitine   DVT R-IJ-on oral A/C  *****ID-s/p proteus bronchitis-s/p meropenum changed to ceftriaxone and back to meropenem/vanco- off of ABX as of 9/19--restart of ABX 9/27-meropenem/vanco-s/p  meropenem s/p vanco for MRSE sepsis x 6 wks, plastics/ortho for elbow-proteus (?wash out)-vac in place-suppressive rx-bact/fluconazole as of 11/15  PT-OOB as able                             GI-TF as able--f/up LFTs-normal-NGT out-eating      ****ORTHO f/up--? additional wash out of elbow wound?; wound care f/up  **VC dysfunction--aspiration precautions-s/p trach 10/10-ENT f/up s/p laryngoscopy- decannulated  swallow issues-may need new dentures  **right shoulder issues-? ortho re-consult--improved significantly  Heme onc f/up colon ca  prog-improving                PT-to continue-more OOB       ***DC planning (flu shot/shingles etc.) to rehab    Eulalio Allison MD-Pulmonary   [phone]

## 2022-11-22 NOTE — PROGRESS NOTE ADULT - REASON FOR ADMISSION
Type A aortic dissection
Type A aortic dissection, bio bentail
Type A aortic dissection
Type A aortic dissection.
Type A aortic dissection

## 2022-11-22 NOTE — PROGRESS NOTE ADULT - ASSESSMENT
71 year-old female with a history of DM2, CAD, A-Fib on apixaban, Severe Persistent Asthma (on chronic steroids, recently started on Tezspire), colon cancer s/p resection/chemo, and tracheobronchomalacia s/p tracheoplasty, and recent OM of the R foot s/b debridement and completed course of Vancomycin/Ertapenem for MRSA/ESBL Proteus/Corynebacterium who now presents with chest pain, found to have Type A dissection s/p repair with modified Bentall procedure and hemiarch replacement on 22. Post-op course complicated by acute hypoxemic respiratory failure, shock, anemia, and hyperglycemia requiring insulin gtt. Extubated -trached/decannulated.    Assessment:  Acute hypoxemic respiratory failure requiring intubation  Type A Aortic Dissection  Severe Persistent Asthma  History of Tracheobronchomalacia    Plan:  See below:  -**************************                                SEE Below:  -improving but weakness  9/15-ABX adjusted to ceftriaxone (LFTS)-PICU  -PICU, low grade temp-fever work up in progress, no resp decline or sx  -resp stable at present but tenuous  -for FEESST today, NGT in place w/TF  -s/p FEESST-passed, remains in PICU          -TF in place at 40cc, more OOB          -hypoglycemia noted and rx, no change in resp status  -vented/sedated-pressors/inotropes/ABX  -remains vented on nitrous/less O2 needs, improving LFTS                   -vented/semi sedated--less O2 needs  10/3-on vanco, weaning sedation/, all lines changed  10/6-no changes-now afebrile-on vanco   10/7-no weaning-remains off pressors  10/10-for trach, no weaning done               10/11-s/p trach-uneventful  10/12-TC and ;cpap done and tolerated well  10/13-weaning TC continues              10/14-mucus issues-TC continues  10/24-TC x mult days-stable-secretion issues remain           10/25-replaced on vent for secretions            10/26-TC in place, mult consults  10/27-no changes-TC continues    10/28-wound care-vac in place  10/31-TC in place, elbow wound vac in place  -resp stable, elbow vac changed--ABX as per ID     -no new issues over the day  11/3-no new events-on TC/OOB as able; ENT f/up for awake laryngoscopy  -ENT f/up, vest use-feels well                                  -no new events--await decannulation   -phonating well, wound vac right arm in place  -awaiting floor bed-board CICU                    11/10-CTU-vanco until -no new events                     -continued ambulation, TC remains  -stable over the day  -decannulated and phonating well, wants rehab  11/15-tx floor, await swallow evaln  -swallow evaln in progress    -MS pain right shoulder                   -NGT removed and eating well  -awaiting rehab as per pt  -no new events-DC planning

## 2022-11-22 NOTE — PROGRESS NOTE ADULT - TIME-BASED
35
40
35
40
35
40
35
40
35

## 2022-11-22 NOTE — DISCHARGE NOTE NURSING/CASE MANAGEMENT/SOCIAL WORK - NSDCVIVACCINE_GEN_ALL_CORE_FT
COVID-19, mRNA, LNP-S, PF, 100 mcg/ 0.5 mL dose (Moderna); 06-Dec-2021 17:12; Afshan Lew (RAD[facility]); [employer] [id number] (Exp. Date: 22-Dec-2021); IntraMuscular; Deltoid Left.; 0.25 milliLiter(s);

## 2022-11-22 NOTE — DISCHARGE NOTE NURSING/CASE MANAGEMENT/SOCIAL WORK - NSDCPEFALRISK_GEN_ALL_CORE
For information on Fall & Injury Prevention, visit: [email or web address] OR  [email or web address] OR  https://www.cdc.gov/steadi/patient.html

## 2022-11-22 NOTE — CHART NOTE - NSCHARTNOTEFT_GEN_A_CORE
Called by primary team for final insulin recs. Pt going to rehab today.    Review BG in las 24 hours. 200s most of the time while on present insulin doses. Per team pt eating well. Pt was on  Lantus 35 units plus Novolog  ac meals of around 10 units.  Pt with long hospitalization needing less insulin but likley underdosing insulin now that she is eating. Also remains on Prednisone 8mg daily for AI.  Spoke to primary team for final recs.    POCT Blood Glucose.: 120 mg/dL (11-22-22 @ 11:32)  POCT Blood Glucose.: 225 mg/dL (11-22-22 @ 07:49)  POCT Blood Glucose.: 153 mg/dL (11-22-22 @ 00:00)  POCT Blood Glucose.: 297 mg/dL (11-21-22 @ 21:25)  POCT Blood Glucose.: 205 mg/dL (11-21-22 @ 16:37)  POCT Blood Glucose.: 232 mg/dL (11-21-22 @ 11:45)  POCT Blood Glucose.: 228 mg/dL (11-21-22 @ 07:42)  POCT Blood Glucose.: 111 mg/dL (11-20-22 @ 21:36)      MEDICATIONS:  fluconAZOLE   Tablet 400 milliGRAM(s) Oral daily  insulin glargine Injectable (LANTUS) 4 Unit(s) SubCutaneous every morning  insulin lispro (ADMELOG) corrective regimen sliding scale   SubCutaneous <User Schedule>  insulin lispro (ADMELOG) corrective regimen sliding scale   SubCutaneous three times a day before meals  insulin lispro Injectable (ADMELOG) 3 Unit(s) SubCutaneous before dinner  insulin lispro Injectable (ADMELOG) 5 Unit(s) SubCutaneous before breakfast  insulin lispro Injectable (ADMELOG) 5 Unit(s) SubCutaneous before lunch  methylPREDNISolone 8 milliGRAM(s) Oral daily  trimethoprim   80 mG/sulfamethoxazole 400 mG 1 Tablet(s) Oral daily    Discharge Plan:   -test BG ac meals/hs and 6am daily  -Increase Lantus dose to 8 units QAM daily at 6am. Fasting BG >200s for the past 5 days  -Adjust Admelog 7 w/meals. Pt requiring total of 5 units of insulin ac meals and still with BG >200s  -c/W Admelog low correction scale ac/hs  -Will jose e need further insulin adjustments in rehab  -diet-change to consistent carb diet  - Recommend routine outpatient ophthalmology, podiatry  - Can f/u with endocrinologist Dr. Saavedra.       Problem/Plan - 2:  ·  Problem: Adrenal insufficiency.   ·  Plan: On chronic steroids at home: medrol 8mg qd   C/w Prednisone 8mg qd   Will need stress steroids if acutely ill.     Problem/Plan - 3:  ·  Problem: Hyperlipidemia.   ·  Plan: Off rosuvastatin 5mg daily  Re start if not contraindicated and as per cardiology  -F/u levels as out pt.  Spoke to team about it

## 2022-11-22 NOTE — PROGRESS NOTE ADULT - SUBJECTIVE AND OBJECTIVE BOX
CHIEF COMPLAINT: f/up sob, chronic resp failure, TBM, severe persistent asthma, VC dysfunction, Type A aortic dissection s/p repair w/modified "Bentall procedure and hemiarch replacement 9/6- decannulated-on RA,-feeling well and no complaints-shoulder pain    Interval Events: endo and CTS f/up    REVIEW OF SYSTEMS:  Constitutional: No fevers or chills. No weight loss. No fatigue or generalized malaise.  Eyes: No itching or discharge from the eyes  ENT: No ear pain. No ear discharge. No nasal congestion. No post nasal drip. No epistaxis. No throat pain. No sore throat. No difficulty swallowing.   CV: No chest pain. No palpitations. No lightheadedness or dizziness.   Resp: No dyspnea at rest. No dyspnea on exertion. No orthopnea. No wheezing. No cough. No stridor. No sputum production. No chest pain with respiration.  GI: No nausea. No vomiting. No diarrhea.  MSK: No joint pain or pain in any extremities  Integumentary: No skin lesions. No pedal edema.  Neurological: No gross motor weakness. No sensory changes.  [+ ] All other systems negative  [ ] Unable to assess ROS because ________    OBJECTIVE:  ICU Vital Signs Last 24 Hrs  T(C): 37.1 (22 Nov 2022 00:30), Max: 37.1 (22 Nov 2022 00:30)  T(F): 98.8 (22 Nov 2022 00:30), Max: 98.8 (22 Nov 2022 00:30)  HR: 82 (22 Nov 2022 00:30) (79 - 96)  BP: 96/54 (22 Nov 2022 00:30) (96/54 - 138/82)  BP(mean): 86 (21 Nov 2022 05:11) (86 - 86)  ABP: --  ABP(mean): --  RR: 18 (22 Nov 2022 00:30) (18 - 18)  SpO2: 96% (22 Nov 2022 00:30) (96% - 98%)    O2 Parameters below as of 22 Nov 2022 00:30  Patient On (Oxygen Delivery Method): room air              11-20 @ 07:01  -  11-21 @ 07:00  --------------------------------------------------------  IN: 490 mL / OUT: 750 mL / NET: -260 mL    11-21 @ 07:01  -  11-22 @ 05:04  --------------------------------------------------------  IN: 720 mL / OUT: 650 mL / NET: 70 mL      CAPILLARY BLOOD GLUCOSE      POCT Blood Glucose.: 153 mg/dL (22 Nov 2022 00:00)      PHYSICAL EXAM: NAD in bed on RA  General: Awake, alert, oriented X 3.   HEENT: Atraumatic, normocephalic.                 Mallampatti Grade 2                No nasal congestion.                No tonsillar or pharyngeal exudates.  Lymph Nodes: No palpable lymphadenopathy  Neck: No JVD. No carotid bruit.   Respiratory: Normal chest expansion                         Normal percussion                         Normal and equal air entry                         No wheeze, rhonchi or rales.  Cardiovascular: S1 S2 normal. No murmurs, rubs or gallops.   Abdomen: Soft, non-tender, non-distended. No organomegaly. Normoactive bowel sounds.  Extremities: Warm to touch. Peripheral pulse palpable. No pedal edema.   Skin: No rashes or skin lesions  Neurological: Motor and sensory examination equal and normal in all four extremities.  Psychiatry: Appropriate mood and affect.    HOSPITAL MEDICATIONS:  MEDICATIONS  (STANDING):  acetylcysteine 10%  Inhalation 4 milliLiter(s) Inhalation every 12 hours  albuterol/ipratropium for Nebulization 3 milliLiter(s) Nebulizer every 6 hours  apixaban 5 milliGRAM(s) Enteral Tube every 12 hours  ascorbic acid 500 milliGRAM(s) Oral daily  buDESOnide    Inhalation Suspension 0.5 milliGRAM(s) Inhalation every 12 hours  chlorhexidine 2% Cloths 1 Application(s) Topical <User Schedule>  collagenase Ointment 1 Application(s) Topical daily  dextrose 50% Injectable 25 Gram(s) IV Push once  dextrose 50% Injectable 12.5 Gram(s) IV Push once  dextrose 50% Injectable 25 Gram(s) IV Push once  dextrose Oral Gel 15 Gram(s) Oral once  diphenhydramine 2%/zinc acetate 0.1% Cream 1 Application(s) Topical every 8 hours  fluconAZOLE   Tablet 400 milliGRAM(s) Oral daily  glucagon  Injectable 1 milliGRAM(s) IntraMuscular once  hydrALAZINE 50 milliGRAM(s) Oral three times a day  influenza  Vaccine (FLUBLOK) 0.5 milliLiter(s) IntraMuscular once  insulin glargine Injectable (LANTUS) 4 Unit(s) SubCutaneous every morning  insulin lispro (ADMELOG) corrective regimen sliding scale   SubCutaneous <User Schedule>  insulin lispro (ADMELOG) corrective regimen sliding scale   SubCutaneous three times a day before meals  insulin lispro Injectable (ADMELOG) 3 Unit(s) SubCutaneous before dinner  insulin lispro Injectable (ADMELOG) 5 Unit(s) SubCutaneous before breakfast  insulin lispro Injectable (ADMELOG) 5 Unit(s) SubCutaneous before lunch  magnesium oxide 400 milliGRAM(s) Oral every 8 hours  methylPREDNISolone 8 milliGRAM(s) Oral daily  metoclopramide Injectable 5 milliGRAM(s) IV Push every 8 hours  metoprolol tartrate 12.5 milliGRAM(s) Oral two times a day  mexiletine 200 milliGRAM(s) Oral every 8 hours  multivitamin 1 Tablet(s) Oral daily  nystatin Powder 1 Application(s) Topical two times a day  pantoprazole  Injectable 40 milliGRAM(s) IV Push daily  sodium chloride 0.9% lock flush 3 milliLiter(s) IV Push every 8 hours  trimethoprim   80 mG/sulfamethoxazole 400 mG 1 Tablet(s) Oral daily    MEDICATIONS  (PRN):  acetaminophen    Suspension .. 650 milliGRAM(s) Oral every 6 hours PRN Temp greater or equal to 38C (100.4F), Mild Pain (1 - 3)  calamine/zinc oxide Lotion 1 Application(s) Topical every 8 hours PRN Itching      LABS:                    MICROBIOLOGY:     RADIOLOGY:  [ ] Reviewed and interpreted by me    Point of Care Ultrasound Findings:    PFT:    EKG:

## 2022-11-22 NOTE — DISCHARGE NOTE NURSING/CASE MANAGEMENT/SOCIAL WORK - PATIENT PORTAL LINK FT
You can access the FollowMyHealth Patient Portal offered by [facility] by registering at the following website: [email or web address]. By joining [employer]’s FollowMyHealth portal, you will also be able to view your health information using other applications (apps) compatible with our system.

## 2022-11-22 NOTE — CHART NOTE - NSCHARTNOTESELECT_GEN_ALL_CORE
Event Note
Nutrition Services
endocrine/Event Note
endocrine/Event Note
wound team/Event Note
ENT/Event Note
Endocrine/Event Note
Endocrine/Event Note
Event Note
Nutrition Services
Pre-Bronchoscopy TB Screening/Event Note
Speech and Swallow
Speech and Swallow
endocrine/Event Note
endocrine/Event Note
wound team/Event Note

## 2022-11-23 ENCOUNTER — TRANSCRIPTION ENCOUNTER (OUTPATIENT)
Age: 72
End: 2022-11-23

## 2022-11-23 ENCOUNTER — NON-APPOINTMENT (OUTPATIENT)
Age: 72
End: 2022-11-23

## 2022-11-24 ENCOUNTER — INPATIENT (INPATIENT)
Facility: HOSPITAL | Age: 72
LOS: 5 days | Discharge: NURSING, REHAB OR LONG-TERM CARE | DRG: 177 | End: 2022-11-30
Attending: THORACIC SURGERY (CARDIOTHORACIC VASCULAR SURGERY) | Admitting: STUDENT IN AN ORGANIZED HEALTH CARE EDUCATION/TRAINING PROGRAM
Payer: MEDICARE

## 2022-11-24 ENCOUNTER — TRANSCRIPTION ENCOUNTER (OUTPATIENT)
Age: 72
End: 2022-11-24

## 2022-11-24 VITALS
SYSTOLIC BLOOD PRESSURE: 145 MMHG | WEIGHT: 136.91 LBS | RESPIRATION RATE: 20 BRPM | OXYGEN SATURATION: 99 % | TEMPERATURE: 98 F | HEART RATE: 69 BPM | HEIGHT: 67 IN | DIASTOLIC BLOOD PRESSURE: 81 MMHG

## 2022-11-24 DIAGNOSIS — R53.83 OTHER FATIGUE: ICD-10-CM

## 2022-11-24 DIAGNOSIS — H05.352 EXOSTOSIS OF LEFT ORBIT: Chronic | ICD-10-CM

## 2022-11-24 DIAGNOSIS — Z87.09 PERSONAL HISTORY OF OTHER DISEASES OF THE RESPIRATORY SYSTEM: Chronic | ICD-10-CM

## 2022-11-24 DIAGNOSIS — Z96.653 PRESENCE OF ARTIFICIAL KNEE JOINT, BILATERAL: Chronic | ICD-10-CM

## 2022-11-24 DIAGNOSIS — Z98.89 OTHER SPECIFIED POSTPROCEDURAL STATES: Chronic | ICD-10-CM

## 2022-11-24 DIAGNOSIS — K62.5 HEMORRHAGE OF ANUS AND RECTUM: Chronic | ICD-10-CM

## 2022-11-24 DIAGNOSIS — Z98.890 OTHER SPECIFIED POSTPROCEDURAL STATES: Chronic | ICD-10-CM

## 2022-11-24 DIAGNOSIS — R11.0 NAUSEA: ICD-10-CM

## 2022-11-24 LAB
ALBUMIN SERPL ELPH-MCNC: 3.7 G/DL — SIGNIFICANT CHANGE UP (ref 3.3–5)
ALP SERPL-CCNC: 131 U/L — HIGH (ref 40–120)
ALT FLD-CCNC: 22 U/L — SIGNIFICANT CHANGE UP (ref 10–45)
ANION GAP SERPL CALC-SCNC: 19 MMOL/L — HIGH (ref 5–17)
ANISOCYTOSIS BLD QL: SLIGHT — SIGNIFICANT CHANGE UP
AST SERPL-CCNC: 23 U/L — SIGNIFICANT CHANGE UP (ref 10–40)
BASE EXCESS BLDV CALC-SCNC: -0.2 MMOL/L — SIGNIFICANT CHANGE UP (ref -2–3)
BASOPHILS # BLD AUTO: 0 K/UL — SIGNIFICANT CHANGE UP (ref 0–0.2)
BASOPHILS NFR BLD AUTO: 0 % — SIGNIFICANT CHANGE UP (ref 0–2)
BILIRUB SERPL-MCNC: 0.3 MG/DL — SIGNIFICANT CHANGE UP (ref 0.2–1.2)
BUN SERPL-MCNC: 13 MG/DL — SIGNIFICANT CHANGE UP (ref 7–23)
CA-I SERPL-SCNC: 1.28 MMOL/L — SIGNIFICANT CHANGE UP (ref 1.15–1.33)
CALCIUM SERPL-MCNC: 10.1 MG/DL — SIGNIFICANT CHANGE UP (ref 8.4–10.5)
CHLORIDE BLDV-SCNC: 100 MMOL/L — SIGNIFICANT CHANGE UP (ref 96–108)
CHLORIDE SERPL-SCNC: 99 MMOL/L — SIGNIFICANT CHANGE UP (ref 96–108)
CO2 BLDV-SCNC: 27 MMOL/L — HIGH (ref 22–26)
CO2 SERPL-SCNC: 21 MMOL/L — LOW (ref 22–31)
CREAT SERPL-MCNC: 0.73 MG/DL — SIGNIFICANT CHANGE UP (ref 0.5–1.3)
DACRYOCYTES BLD QL SMEAR: SLIGHT — SIGNIFICANT CHANGE UP
EGFR: 86 ML/MIN/1.73M2 — SIGNIFICANT CHANGE UP
EOSINOPHIL # BLD AUTO: 0.11 K/UL — SIGNIFICANT CHANGE UP (ref 0–0.5)
EOSINOPHIL NFR BLD AUTO: 0.9 % — SIGNIFICANT CHANGE UP (ref 0–6)
GAS PNL BLDV: 135 MMOL/L — LOW (ref 136–145)
GAS PNL BLDV: SIGNIFICANT CHANGE UP
GAS PNL BLDV: SIGNIFICANT CHANGE UP
GLUCOSE BLDV-MCNC: 303 MG/DL — HIGH (ref 70–99)
GLUCOSE SERPL-MCNC: 275 MG/DL — HIGH (ref 70–99)
HCO3 BLDV-SCNC: 26 MMOL/L — SIGNIFICANT CHANGE UP (ref 22–29)
HCT VFR BLD CALC: 44.6 % — SIGNIFICANT CHANGE UP (ref 34.5–45)
HCT VFR BLDA CALC: 40 % — SIGNIFICANT CHANGE UP (ref 34.5–46.5)
HGB BLD CALC-MCNC: 13.2 G/DL — SIGNIFICANT CHANGE UP (ref 11.7–16.1)
HGB BLD-MCNC: 12.9 G/DL — SIGNIFICANT CHANGE UP (ref 11.5–15.5)
LACTATE BLDV-MCNC: 2.4 MMOL/L — HIGH (ref 0.5–2)
LYMPHOCYTES # BLD AUTO: 1.25 K/UL — SIGNIFICANT CHANGE UP (ref 1–3.3)
LYMPHOCYTES # BLD AUTO: 10.5 % — LOW (ref 13–44)
MAGNESIUM SERPL-MCNC: 1.9 MG/DL — SIGNIFICANT CHANGE UP (ref 1.6–2.6)
MANUAL SMEAR VERIFICATION: SIGNIFICANT CHANGE UP
MCHC RBC-ENTMCNC: 22.5 PG — LOW (ref 27–34)
MCHC RBC-ENTMCNC: 28.9 GM/DL — LOW (ref 32–36)
MCV RBC AUTO: 77.8 FL — LOW (ref 80–100)
METAMYELOCYTES # FLD: 0.9 % — HIGH (ref 0–0)
MICROCYTES BLD QL: SLIGHT — SIGNIFICANT CHANGE UP
MONOCYTES # BLD AUTO: 0.94 K/UL — HIGH (ref 0–0.9)
MONOCYTES NFR BLD AUTO: 7.9 % — SIGNIFICANT CHANGE UP (ref 2–14)
NEUTROPHILS # BLD AUTO: 9.54 K/UL — HIGH (ref 1.8–7.4)
NEUTROPHILS NFR BLD AUTO: 77.2 % — HIGH (ref 43–77)
NEUTS BAND # BLD: 2.6 % — SIGNIFICANT CHANGE UP (ref 0–8)
NRBC # BLD: 1 /100 — HIGH (ref 0–0)
PCO2 BLDV: 47 MMHG — HIGH (ref 39–42)
PH BLDV: 7.35 — SIGNIFICANT CHANGE UP (ref 7.32–7.43)
PHOSPHATE SERPL-MCNC: 3.6 MG/DL — SIGNIFICANT CHANGE UP (ref 2.5–4.5)
PLAT MORPH BLD: NORMAL — SIGNIFICANT CHANGE UP
PLATELET # BLD AUTO: 261 K/UL — SIGNIFICANT CHANGE UP (ref 150–400)
PO2 BLDV: 42 MMHG — SIGNIFICANT CHANGE UP (ref 25–45)
POIKILOCYTOSIS BLD QL AUTO: SLIGHT — SIGNIFICANT CHANGE UP
POLYCHROMASIA BLD QL SMEAR: SLIGHT — SIGNIFICANT CHANGE UP
POTASSIUM BLDV-SCNC: 4.4 MMOL/L — SIGNIFICANT CHANGE UP (ref 3.5–5.1)
POTASSIUM SERPL-MCNC: 4.5 MMOL/L — SIGNIFICANT CHANGE UP (ref 3.5–5.3)
POTASSIUM SERPL-SCNC: 4.5 MMOL/L — SIGNIFICANT CHANGE UP (ref 3.5–5.3)
PROT SERPL-MCNC: 7.9 G/DL — SIGNIFICANT CHANGE UP (ref 6–8.3)
RAPID RVP RESULT: DETECTED
RBC # BLD: 5.73 M/UL — HIGH (ref 3.8–5.2)
RBC # FLD: 19.7 % — HIGH (ref 10.3–14.5)
RBC BLD AUTO: ABNORMAL
SAO2 % BLDV: 65 % — LOW (ref 67–88)
SARS-COV-2 RNA SPEC QL NAA+PROBE: DETECTED
SCHISTOCYTES BLD QL AUTO: SLIGHT — SIGNIFICANT CHANGE UP
SODIUM SERPL-SCNC: 139 MMOL/L — SIGNIFICANT CHANGE UP (ref 135–145)
TARGETS BLD QL SMEAR: SLIGHT — SIGNIFICANT CHANGE UP
TROPONIN T, HIGH SENSITIVITY RESULT: 38 NG/L — SIGNIFICANT CHANGE UP (ref 0–51)
WBC # BLD: 11.95 K/UL — HIGH (ref 3.8–10.5)
WBC # FLD AUTO: 11.95 K/UL — HIGH (ref 3.8–10.5)

## 2022-11-24 PROCEDURE — 71045 X-RAY EXAM CHEST 1 VIEW: CPT | Mod: 26

## 2022-11-24 PROCEDURE — 99285 EMERGENCY DEPT VISIT HI MDM: CPT

## 2022-11-24 RX ORDER — INSULIN LISPRO 100/ML
VIAL (ML) SUBCUTANEOUS AT BEDTIME
Refills: 0 | Status: DISCONTINUED | OUTPATIENT
Start: 2022-11-24 | End: 2022-11-30

## 2022-11-24 RX ORDER — METOPROLOL TARTRATE 50 MG
12.5 TABLET ORAL
Refills: 0 | Status: DISCONTINUED | OUTPATIENT
Start: 2022-11-24 | End: 2022-11-30

## 2022-11-24 RX ORDER — FLUCONAZOLE 150 MG/1
400 TABLET ORAL DAILY
Refills: 0 | Status: DISCONTINUED | OUTPATIENT
Start: 2022-11-24 | End: 2022-11-30

## 2022-11-24 RX ORDER — NYSTATIN CREAM 100000 [USP'U]/G
1 CREAM TOPICAL
Refills: 0 | Status: DISCONTINUED | OUTPATIENT
Start: 2022-11-24 | End: 2022-11-30

## 2022-11-24 RX ORDER — ONDANSETRON 8 MG/1
4 TABLET, FILM COATED ORAL ONCE
Refills: 0 | Status: COMPLETED | OUTPATIENT
Start: 2022-11-24 | End: 2022-11-24

## 2022-11-24 RX ORDER — APIXABAN 2.5 MG/1
5 TABLET, FILM COATED ORAL EVERY 12 HOURS
Refills: 0 | Status: DISCONTINUED | OUTPATIENT
Start: 2022-11-24 | End: 2022-11-30

## 2022-11-24 RX ORDER — MEXILETINE HYDROCHLORIDE 150 MG/1
200 CAPSULE ORAL EVERY 8 HOURS
Refills: 0 | Status: DISCONTINUED | OUTPATIENT
Start: 2022-11-24 | End: 2022-11-30

## 2022-11-24 RX ORDER — HYDRALAZINE HCL 50 MG
50 TABLET ORAL THREE TIMES A DAY
Refills: 0 | Status: DISCONTINUED | OUTPATIENT
Start: 2022-11-24 | End: 2022-11-30

## 2022-11-24 RX ORDER — BUDESONIDE, MICRONIZED 100 %
0.5 POWDER (GRAM) MISCELLANEOUS
Refills: 0 | Status: DISCONTINUED | OUTPATIENT
Start: 2022-11-24 | End: 2022-11-30

## 2022-11-24 RX ORDER — INSULIN LISPRO 100/ML
VIAL (ML) SUBCUTANEOUS
Refills: 0 | Status: DISCONTINUED | OUTPATIENT
Start: 2022-11-24 | End: 2022-11-29

## 2022-11-24 RX ORDER — IPRATROPIUM/ALBUTEROL SULFATE 18-103MCG
3 AEROSOL WITH ADAPTER (GRAM) INHALATION EVERY 6 HOURS
Refills: 0 | Status: DISCONTINUED | OUTPATIENT
Start: 2022-11-24 | End: 2022-11-30

## 2022-11-24 RX ORDER — ATORVASTATIN CALCIUM 80 MG/1
20 TABLET, FILM COATED ORAL AT BEDTIME
Refills: 0 | Status: DISCONTINUED | OUTPATIENT
Start: 2022-11-24 | End: 2022-11-30

## 2022-11-24 RX ORDER — MAGNESIUM OXIDE 400 MG ORAL TABLET 241.3 MG
400 TABLET ORAL EVERY 8 HOURS
Refills: 0 | Status: DISCONTINUED | OUTPATIENT
Start: 2022-11-24 | End: 2022-11-30

## 2022-11-24 RX ORDER — COLLAGENASE CLOSTRIDIUM HIST. 250 UNIT/G
1 OINTMENT (GRAM) TOPICAL DAILY
Refills: 0 | Status: DISCONTINUED | OUTPATIENT
Start: 2022-11-24 | End: 2022-11-30

## 2022-11-24 RX ORDER — ASCORBIC ACID 60 MG
500 TABLET,CHEWABLE ORAL DAILY
Refills: 0 | Status: DISCONTINUED | OUTPATIENT
Start: 2022-11-24 | End: 2022-11-30

## 2022-11-24 RX ORDER — ACETYLCYSTEINE 200 MG/ML
4 VIAL (ML) MISCELLANEOUS EVERY 12 HOURS
Refills: 0 | Status: DISCONTINUED | OUTPATIENT
Start: 2022-11-24 | End: 2022-11-30

## 2022-11-24 RX ORDER — PANTOPRAZOLE SODIUM 20 MG/1
40 TABLET, DELAYED RELEASE ORAL
Refills: 0 | Status: DISCONTINUED | OUTPATIENT
Start: 2022-11-24 | End: 2022-11-30

## 2022-11-24 RX ORDER — CALAMINE AND ZINC OXIDE AND PHENOL 160; 10 MG/ML; MG/ML
1 LOTION TOPICAL EVERY 8 HOURS
Refills: 0 | Status: DISCONTINUED | OUTPATIENT
Start: 2022-11-24 | End: 2022-11-30

## 2022-11-24 RX ADMIN — ONDANSETRON 4 MILLIGRAM(S): 8 TABLET, FILM COATED ORAL at 20:18

## 2022-11-24 NOTE — ED ADULT NURSE NOTE - NSICDXPASTSURGICALHX_GEN_ALL_CORE_FT
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    H/O pelvic surgery 5 years ago - s/p fracture    H/O total knee replacement, bilateral 5 years ago    History of partial hysterectomy 30 years ago - fibroids    History of sinus surgery multiple sinus surgeries    History of tracheomalacia 2015 - attempted tracheal stenting ([facility])- course complicated by obstruction, respiratory failure, multiple CPR attempts -  stent discontinued; 10/20/2016 Tracheobronchoplasty (Prolene Mesh) performed at [facility] by Dr Zapien    Rectal bleeding exam under anesthesia ([facility]) 2/2018    S/P bronchoscopy 6/5/2018 - [place] [facility] (Dr Zapien) no evidence of tracheobronchomalacia in trachea or bronchial tubes

## 2022-11-24 NOTE — H&P ADULT - HISTORY OF PRESENT ILLNESS
72 year-old-female with history of DMII, CAD, atrial fibrillation on eliquis, severe persistent asthma on chronic steroids, colon cancer s/p resection/chemo and recent extended hospitalization for type I aortic dissection s/p Bentall procedure and hemiarch replacement 9/6 with Dr. Cabrera. Patient presents today with 2-day history of vomiting and lethargy. Per daughter at bedside, patient was noted to be more lethargic today, and she lasted saw her on Monday. Patient reports that she has been feeling nauseous since Monday, and has been throwing up since then. Patient however denies chest pain, shortness of breath, abdominal pain, difficulty urinating.

## 2022-11-24 NOTE — ED ADULT NURSE REASSESSMENT NOTE - NS ED NURSE REASSESS COMMENT FT1
Received report from RADHA Mark. Pt is A&Ox0, breathing spontaneously, unlabored sating at 91% on RA, switched to 2L NC, sating at 99%, ostomy bag in place and changed by daughter at bedside, US IV placed by MD, 20G on RUE, medication administered. Pt safety and comfort measures provided. Received report from RADHA Mark. Pt is A&Ox3, breathing spontaneously, unlabored sating at 91% on RA, switched to 2L NC, sating at 99%, ostomy bag in place and changed by daughter at bedside, US IV placed by MD, 20G on RUE, medication administered. Pt safety and comfort measures provided.

## 2022-11-24 NOTE — ED PROVIDER NOTE - CLINICAL SUMMARY MEDICAL DECISION MAKING FREE TEXT BOX
Patient is a 72 year-old-female with history of DMII, CAD, atrial fibrillation on eliquis, severe persistent asthma on chronic steroids, colon cancer s/p resection/chemo and recent extended hospitalization for type I aortic dissection presents with 2-day history of vomiting and lethargy. Will obtains sepsis workup. No indication for CT at this point given nonfocal exam. Will also consult CT surgery.

## 2022-11-24 NOTE — H&P ADULT - ASSESSMENT
72 year-old-female with history of DMII, CAD, atrial fibrillation on eliquis, severe persistent asthma on chronic steroids, colon cancer s/p resection/chemo and recent extended hospitalization for type I aortic dissection s/p Bentall procedure and hemiarch replacement  with Dr. Cabrera. Patient presents today with 2-day history of vomiting and lethargy. Per daughter at bedside, patient was noted to be more lethargic today, and she lasted saw her on Monday. Patient reports that she has been feeling nauseous since Monday, and has been throwing up since then. Patient however denies chest pain, shortness of breath, abdominal pain, difficulty urinating.    -improving but weakness  9/15-ABX adjusted to ceftriaxone (LFTS)-PICU  -PICU, low grade temp-fever work up in progress, no resp decline or sx  -resp stable at present but tenuous  -for FEESST today, NGT in place w/TF  -s/p FEESST-passed, remains in PICU          -TF in place at 40cc, more OOB          -hypoglycemia noted and rx, no change in resp status  -vented/sedated-pressors/inotropes/ABX  -remains vented on nitrous/less O2 needs, improving LFTS                   -vented/semi sedated--less O2 needs  10/3-on vanco, weaning sedation/, all lines changed  10/6-no changes-now afebrile-on vanco   10/7-no weaning-remains off pressors  10/10-for trach, no weaning done               10/11-s/p trach-uneventful  10/12-TC and ;cpap done and tolerated well  10/13-weaning TC continues              10/14-mucus issues-TC continues  10/24-TC x mult days-stable-secretion issues remain           10/25-replaced on vent for secretions            10/26-TC in place, mult consults  10/27-no changes-TC continues    10/28-wound care-vac in place  10/31-TC in place, elbow wound vac in place  -resp stable, elbow vac changed--ABX as per ID     -no new issues over the day  11/3-no new events-on TC/OOB as able; ENT f/up for awake laryngoscopy  -ENT f/up, vest use-feels well                                  -no new events--await decannulation   -phonating well, wound vac right arm in place  -awaiting floor bed-board CICU                    11/10-CTU-vanco until -no new events                     -continued ambulation, TC remains  -stable over the day  -decannulated and phonating well, wants rehab  11/15-tx floor, await swallow evaln  -swallow evaln in progress    -MS pain right shoulder                   -NGT removed and eating well  -awaiting rehab as per pt  -no new events-DC planning     Patient seen and examined at bedside with daughter present. Patient states she has been nauseous and vomiting and that her rehab did not have all her medications but she does not know which ones. she states they weren't giving her soft pureed food and could tell her what was on her plate. All labs and imaging to be reviewed by Dr. Cabrera and on call attending Dr. Kumar. Admit to CTS, Dr. Kumar. Blood Cultures x 2, Urinalysis, +MRSA contact. on bactrim for suppressive therapy. CXR noted.

## 2022-11-24 NOTE — H&P ADULT - NSHPPOACENTRALVENOUSCATHETER_GEN_ALL_CORE
Controlled Substance Refill Request  Medication:   Requested Prescriptions     Pending Prescriptions Disp Refills     dextroamphetamine-amphetamine (ADDERALL) 10 mg Tab tablet 30 tablet 0     Sig: Take 1 tablet by mouth daily. In afternoon     dextroamphetamine-amphetamine (ADDERALL XR) 25 MG 24 hr capsule 30 capsule 0     Sig: Take 1 capsule (25 mg total) by mouth daily. In morning     Date Last Fill: 12/31/18  Pharmacy: [id number]   Submit electronically to pharmacy  Controlled Substance Agreement on File:   Encounter-Level CSA Scan Date:    There are no encounter-level csa scan date.       Last office visit: Last office visit pertaining to requested medication was 11/20/18.     no

## 2022-11-24 NOTE — PATIENT PROFILE ADULT - FALL HARM RISK - HARM RISK INTERVENTIONS
Assistance with ambulation/Assistance OOB with selected safe patient handling equipment/Communicate Risk of Fall with Harm to all staff/Discuss with provider need for PT consult/Monitor gait and stability/Provide patient with walking aids - walker, cane, crutches/Reinforce activity limits and safety measures with patient and family/Sit up slowly, dangle for a short time, stand at bedside before walking/Tailored Fall Risk Interventions/Use of alarms - bed, chair and/or voice tab/Visual Cue: Yellow wristband and red socks/Bed in lowest position, wheels locked, appropriate side rails in place/Call bell, personal items and telephone in reach/Instruct patient to call for assistance before getting out of bed or chair/Non-slip footwear when patient is out of bed/[place] to call system/Physically safe environment - no spills, clutter or unnecessary equipment/Purposeful Proactive Rounding/Room/bathroom lighting operational, light cord in reach

## 2022-11-24 NOTE — ED PROVIDER NOTE - PHYSICAL EXAMINATION
General: ill-appearing   HEENT: atraumatic, normocephalic; pupils are equal, round and react to light, extraocular movements intact bilaterally without deficits, no conjunctival pallor, mucous membranes moist  Neck: no jugular venous distension, full range of motion  Chest/Lung: clear to auscultation bilaterally, no wheezes/rhonchi/rales  Heart: regular rate and rhythm, no murmur/gallops/rubs  Abdomen: normal bowel sounds, soft, non-tender, non-distended  Extremities: 1+pitting lower extremity edema, +2 radial pulses bilaterally, +2 dorsalis pedis pulses bilaterally  Musculoskeletal: full range of motion of all 4 extremities  Nervous System: alert and oriented, no motor deficits or sensory deficits; CNII-XII grossly intact; no focal neurologic deficits  Skin: no rashes/lacerations noted

## 2022-11-24 NOTE — ED PROVIDER NOTE - OBJECTIVE STATEMENT
Patient is a 72 year-old-female with history of DMII, CAD, atrial fibrillation on eliquis, severe persistent asthma on chronic steroids, colon cancer s/p resection/chemo and recent extended hospitalization for type I aortic dissection presents with 2-day history of vomiting and lethargy. Per daughter at bedside, patient was noted to be more lethargic today, and she lasted saw her on Monday. Patient reports that she has been feeling nauseous since Monday, and has been throwing up since then. Patient however denies chest pain, shortness of breath, abdominal pain, difficulty urinating.

## 2022-11-24 NOTE — H&P ADULT - NSICDXPASTSURGICALHX_GEN_ALL_CORE_FT
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    H/O pelvic surgery 5 years ago - s/p fracture    H/O total knee replacement, bilateral 5 years ago    History of partial hysterectomy 30 years ago - fibroids    History of sinus surgery multiple sinus surgeries    History of tracheomalacia 2015 - attempted tracheal stenting ([facility])- course complicated by obstruction, respiratory failure, multiple CPR attempts -  stent discontinued; 10/20/2016 Tracheobronchoplasty (Prolene Mesh) performed at [facility] by Dr Zapien    Rectal bleeding exam under anesthesia ([facility]) 2/2018    S/P bronchoscopy 6/5/2018 - [facility] (Dr Zapien) no evidence of tracheobronchomalacia in trachea or bronchial tubes

## 2022-11-24 NOTE — ED PROVIDER NOTE - ATTENDING CONTRIBUTION TO CARE
Attending (Ramsey Humphries M.D.):  I have personally seen and examined this patient. I have performed a substantive portion of the visit including all aspects of the medical decision making. Resident and/or ACP note reviewed. I agree on the plan of care except where noted.

## 2022-11-24 NOTE — H&P ADULT - NSHPPHYSICALEXAM_GEN_ALL_CORE
General: NAD  HEENT:  NC/AT  Neuro: A&Ox3, gait steady, speech clear, no focal deficits noted  Respiratory: BS CTA b/l, no wheezes, rales or rhonchi noted  Cardiovascular: RRR, (+) S1/S2, no murmur appreciated  Chest: +MSI Healing well  GI: Abd soft, NT/ND, (+) BSx4Q + colostomy bag in place  Peripheral Vascular:  no LE edema b/l, 2+ peripheral pulses b/l, no varicosities/PVD noted. +Right elbow dressing CDI  Musculoskeletal: B/L UE and LE 5/5 strength   Psychiatric: lethargic mood, and affect observed  Skin: Normal exam to inspection and palpation. no bleeding, no hematoma.

## 2022-11-24 NOTE — ED CLERICAL - NS ED CLERK NOTE PRE-ARRIVAL INFORMATION; ADDITIONAL PRE-ARRIVAL INFORMATION
This patient is enrolled in the Follow Your Heart program and has undergone a cardiac surgery procedure within the last 30 days and has active care navigation.   This patient can be followed up by the care navigation team within 24 hours. To arrange close follow-up or to obtain additional clinical information about this patient, please call the contact number above.   Please call the cardiac surgery team once patient is registered at [phone] for consultation PRIOR to disposition decision.  The patient recently underwent a cardiac surgery procedure and the team can assist in acute medical management.

## 2022-11-24 NOTE — H&P ADULT - PROBLEM SELECTOR PLAN 1
Admit to CTS Dr. Kumar  Blood Cultures x2  CXR done  Restart Discharge medication  Urinalysis  Dr. Kumar to see patient

## 2022-11-24 NOTE — H&P ADULT - NSHPREVIEWOFSYSTEMS_GEN_ALL_CORE
REVIEW OF SYSTEMS:  CONSTITUTIONAL: Denies fever, weight loss or fatigue  EYES: Denies eye pain, visual disturbances, or discharge  ENMT:  Denies difficulty hearing, tinnitus, vertigo, sinus or throat pain  NECK: Denies pain or stiffness  RESPIRATORY: Denies cough, wheezing, chills, hemoptysis or shortness of breath  CARDIOVASCULAR: Denies chest pain, palpitations, dizziness, or leg swelling  GASTROINTESTINAL: Denies abdominal or epigastric pain, nausea, vomiting, hematemesis, diarrhea or melena  GENITOURINARY: Denies dysuria, frequency, hematuria, or incontinence  NEUROLOGICAL: Denies headaches, memory loss, loss of strength, numbness or tremors  SKIN: Denies itching, burning, rashes, or lesions   LYMPH NODES: Denies enlarged glands  ENDOCRINE: Denies heat or cold intolerance or hair loss  MUSCULOSKELETAL: Denies joint pain or swelling, muscle, back or extremity pain  PSYCHIATRIC: Denies depression, anxiety, mood swings or difficulty sleeping  HEME/LYMPH: Denies easy bruising or bleeding gums  ALLERGY: Denies hives or eczema

## 2022-11-24 NOTE — ED ADULT NURSE NOTE - OBJECTIVE STATEMENT
72 year old female, BIBEMS from nursing home, c/o vomiting and lethargy x2 days. Pt recently had surgery for aortic dissection. Upon assessment, pt A+Ox3 and vomiting. Pt denies furthers symptoms/complaints. Pt refusing to answer further questions, and states "Leave me alone."

## 2022-11-24 NOTE — ED PROVIDER NOTE - NSICDXPASTSURGICALHX_GEN_ALL_CORE_FT
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    H/O pelvic surgery 5 years ago - s/p fracture    H/O total knee replacement, bilateral 5 years ago    History of partial hysterectomy 30 years ago - fibroids    History of sinus surgery multiple sinus surgeries    History of tracheomalacia 2015 - attempted tracheal stenting ([facility])- course complicated by obstruction, respiratory failure, multiple CPR attempts -  stent discontinued; 10/20/2016 Tracheobronchoplasty (Prolene Mesh) performed at [facility] by Dr Zapien    Rectal bleeding exam under anesthesia ([facility]) 2/2018    S/P bronchoscopy 6/5/2018 - [facility] (Dr Zapien) no evidence of tracheobronchomalacia in trachea or bronchial tubes

## 2022-11-24 NOTE — ED PROVIDER NOTE - PROGRESS NOTE DETAILS
[place] PGY2  Consulted CT surgery. Pepito Lombardi MD PGY-2  D/W CTSx recs, currently recommended to obtain an ABG, admit to Dr. Allan Kumar. Asked about Chest / abd imaging, told CXR fine for now. Clarified a few times indications for ABG & no abd imaging, and ultimately still reached the same conclusion.   - ABG  - Admit to [facility]

## 2022-11-25 DIAGNOSIS — R76.8 OTHER SPECIFIED ABNORMAL IMMUNOLOGICAL FINDINGS IN SERUM: ICD-10-CM

## 2022-11-25 LAB
ALBUMIN SERPL ELPH-MCNC: 3.4 G/DL — SIGNIFICANT CHANGE UP (ref 3.3–5)
ALBUMIN SERPL ELPH-MCNC: 3.5 G/DL — SIGNIFICANT CHANGE UP (ref 3.3–5)
ALP SERPL-CCNC: 117 U/L — SIGNIFICANT CHANGE UP (ref 40–120)
ALP SERPL-CCNC: 119 U/L — SIGNIFICANT CHANGE UP (ref 40–120)
ALT FLD-CCNC: 21 U/L — SIGNIFICANT CHANGE UP (ref 10–45)
ALT FLD-CCNC: 23 U/L — SIGNIFICANT CHANGE UP (ref 10–45)
ANION GAP SERPL CALC-SCNC: 16 MMOL/L — SIGNIFICANT CHANGE UP (ref 5–17)
ANION GAP SERPL CALC-SCNC: 19 MMOL/L — HIGH (ref 5–17)
APTT BLD: 38.2 SEC — HIGH (ref 27.5–35.5)
AST SERPL-CCNC: 21 U/L — SIGNIFICANT CHANGE UP (ref 10–40)
AST SERPL-CCNC: 24 U/L — SIGNIFICANT CHANGE UP (ref 10–40)
BASOPHILS # BLD AUTO: 0.07 K/UL — SIGNIFICANT CHANGE UP (ref 0–0.2)
BASOPHILS NFR BLD AUTO: 0.5 % — SIGNIFICANT CHANGE UP (ref 0–2)
BILIRUB DIRECT SERPL-MCNC: <0.1 MG/DL — SIGNIFICANT CHANGE UP (ref 0–0.3)
BILIRUB INDIRECT FLD-MCNC: >0.1 MG/DL — LOW (ref 0.2–1)
BILIRUB SERPL-MCNC: 0.2 MG/DL — SIGNIFICANT CHANGE UP (ref 0.2–1.2)
BILIRUB SERPL-MCNC: 0.3 MG/DL — SIGNIFICANT CHANGE UP (ref 0.2–1.2)
BLD GP AB SCN SERPL QL: NEGATIVE — SIGNIFICANT CHANGE UP
BUN SERPL-MCNC: 17 MG/DL — SIGNIFICANT CHANGE UP (ref 7–23)
BUN SERPL-MCNC: 18 MG/DL — SIGNIFICANT CHANGE UP (ref 7–23)
CALCIUM SERPL-MCNC: 10.3 MG/DL — SIGNIFICANT CHANGE UP (ref 8.4–10.5)
CALCIUM SERPL-MCNC: 9.9 MG/DL — SIGNIFICANT CHANGE UP (ref 8.4–10.5)
CHLORIDE SERPL-SCNC: 100 MMOL/L — SIGNIFICANT CHANGE UP (ref 96–108)
CHLORIDE SERPL-SCNC: 98 MMOL/L — SIGNIFICANT CHANGE UP (ref 96–108)
CO2 SERPL-SCNC: 20 MMOL/L — LOW (ref 22–31)
CO2 SERPL-SCNC: 22 MMOL/L — SIGNIFICANT CHANGE UP (ref 22–31)
CREAT SERPL-MCNC: 0.64 MG/DL — SIGNIFICANT CHANGE UP (ref 0.5–1.3)
CREAT SERPL-MCNC: 0.69 MG/DL — SIGNIFICANT CHANGE UP (ref 0.5–1.3)
EGFR: 91 ML/MIN/1.73M2 — SIGNIFICANT CHANGE UP
EGFR: 93 ML/MIN/1.73M2 — SIGNIFICANT CHANGE UP
EOSINOPHIL # BLD AUTO: 0.03 K/UL — SIGNIFICANT CHANGE UP (ref 0–0.5)
EOSINOPHIL NFR BLD AUTO: 0.2 % — SIGNIFICANT CHANGE UP (ref 0–6)
GAS PNL BLDA: SIGNIFICANT CHANGE UP
GLUCOSE BLDC GLUCOMTR-MCNC: 237 MG/DL — HIGH (ref 70–99)
GLUCOSE BLDC GLUCOMTR-MCNC: 268 MG/DL — HIGH (ref 70–99)
GLUCOSE BLDC GLUCOMTR-MCNC: 281 MG/DL — HIGH (ref 70–99)
GLUCOSE SERPL-MCNC: 284 MG/DL — HIGH (ref 70–99)
GLUCOSE SERPL-MCNC: 316 MG/DL — HIGH (ref 70–99)
HCT VFR BLD CALC: 42.5 % — SIGNIFICANT CHANGE UP (ref 34.5–45)
HCT VFR BLD CALC: 43.3 % — SIGNIFICANT CHANGE UP (ref 34.5–45)
HGB BLD-MCNC: 12.5 G/DL — SIGNIFICANT CHANGE UP (ref 11.5–15.5)
HGB BLD-MCNC: 12.8 G/DL — SIGNIFICANT CHANGE UP (ref 11.5–15.5)
IMM GRANULOCYTES NFR BLD AUTO: 2.8 % — HIGH (ref 0–0.9)
INR BLD: 1.51 RATIO — HIGH (ref 0.88–1.16)
INR BLD: 1.7 RATIO — HIGH (ref 0.88–1.16)
LACTATE SERPL-SCNC: 1.8 MMOL/L — SIGNIFICANT CHANGE UP (ref 0.5–2)
LYMPHOCYTES # BLD AUTO: 1.48 K/UL — SIGNIFICANT CHANGE UP (ref 1–3.3)
LYMPHOCYTES # BLD AUTO: 10.2 % — LOW (ref 13–44)
MCHC RBC-ENTMCNC: 22.6 PG — LOW (ref 27–34)
MCHC RBC-ENTMCNC: 22.7 PG — LOW (ref 27–34)
MCHC RBC-ENTMCNC: 29.4 GM/DL — LOW (ref 32–36)
MCHC RBC-ENTMCNC: 29.6 GM/DL — LOW (ref 32–36)
MCV RBC AUTO: 76.6 FL — LOW (ref 80–100)
MCV RBC AUTO: 76.7 FL — LOW (ref 80–100)
MONOCYTES # BLD AUTO: 0.62 K/UL — SIGNIFICANT CHANGE UP (ref 0–0.9)
MONOCYTES NFR BLD AUTO: 4.3 % — SIGNIFICANT CHANGE UP (ref 2–14)
NEUTROPHILS # BLD AUTO: 11.93 K/UL — HIGH (ref 1.8–7.4)
NEUTROPHILS NFR BLD AUTO: 82 % — HIGH (ref 43–77)
NRBC # BLD: 0 /100 WBCS — SIGNIFICANT CHANGE UP (ref 0–0)
NRBC # BLD: 0 /100 WBCS — SIGNIFICANT CHANGE UP (ref 0–0)
NT-PROBNP SERPL-SCNC: 239 PG/ML — SIGNIFICANT CHANGE UP (ref 0–300)
PLATELET # BLD AUTO: 234 K/UL — SIGNIFICANT CHANGE UP (ref 150–400)
PLATELET # BLD AUTO: 255 K/UL — SIGNIFICANT CHANGE UP (ref 150–400)
POTASSIUM SERPL-MCNC: 4.8 MMOL/L — SIGNIFICANT CHANGE UP (ref 3.5–5.3)
POTASSIUM SERPL-MCNC: 5.1 MMOL/L — SIGNIFICANT CHANGE UP (ref 3.5–5.3)
POTASSIUM SERPL-SCNC: 4.8 MMOL/L — SIGNIFICANT CHANGE UP (ref 3.5–5.3)
POTASSIUM SERPL-SCNC: 5.1 MMOL/L — SIGNIFICANT CHANGE UP (ref 3.5–5.3)
PROT SERPL-MCNC: 7.5 G/DL — SIGNIFICANT CHANGE UP (ref 6–8.3)
PROT SERPL-MCNC: 7.9 G/DL — SIGNIFICANT CHANGE UP (ref 6–8.3)
PROTHROM AB SERPL-ACNC: 17.6 SEC — HIGH (ref 10.5–13.4)
PROTHROM AB SERPL-ACNC: 19.8 SEC — HIGH (ref 10.5–13.4)
RBC # BLD: 5.54 M/UL — HIGH (ref 3.8–5.2)
RBC # BLD: 5.65 M/UL — HIGH (ref 3.8–5.2)
RBC # FLD: 19.3 % — HIGH (ref 10.3–14.5)
RBC # FLD: 20 % — HIGH (ref 10.3–14.5)
RH IG SCN BLD-IMP: POSITIVE — SIGNIFICANT CHANGE UP
SODIUM SERPL-SCNC: 137 MMOL/L — SIGNIFICANT CHANGE UP (ref 135–145)
SODIUM SERPL-SCNC: 138 MMOL/L — SIGNIFICANT CHANGE UP (ref 135–145)
WBC # BLD: 10.11 K/UL — SIGNIFICANT CHANGE UP (ref 3.8–10.5)
WBC # BLD: 14.54 K/UL — HIGH (ref 3.8–10.5)
WBC # FLD AUTO: 10.11 K/UL — SIGNIFICANT CHANGE UP (ref 3.8–10.5)
WBC # FLD AUTO: 14.54 K/UL — HIGH (ref 3.8–10.5)

## 2022-11-25 PROCEDURE — 99233 SBSQ HOSP IP/OBS HIGH 50: CPT | Mod: 24

## 2022-11-25 PROCEDURE — 99232 SBSQ HOSP IP/OBS MODERATE 35: CPT

## 2022-11-25 PROCEDURE — 99222 1ST HOSP IP/OBS MODERATE 55: CPT

## 2022-11-25 RX ORDER — METOCLOPRAMIDE HCL 10 MG
4 TABLET ORAL ONCE
Refills: 0 | Status: COMPLETED | OUTPATIENT
Start: 2022-11-25 | End: 2022-11-25

## 2022-11-25 RX ORDER — MUPIROCIN 20 MG/G
1 OINTMENT TOPICAL
Refills: 0 | Status: DISCONTINUED | OUTPATIENT
Start: 2022-11-25 | End: 2022-11-30

## 2022-11-25 RX ORDER — REMDESIVIR 5 MG/ML
100 INJECTION INTRAVENOUS EVERY 24 HOURS
Refills: 0 | Status: COMPLETED | OUTPATIENT
Start: 2022-11-26 | End: 2022-11-29

## 2022-11-25 RX ORDER — REMDESIVIR 5 MG/ML
INJECTION INTRAVENOUS
Refills: 0 | Status: COMPLETED | OUTPATIENT
Start: 2022-11-25 | End: 2022-11-29

## 2022-11-25 RX ORDER — SODIUM CHLORIDE 9 MG/ML
3 INJECTION INTRAMUSCULAR; INTRAVENOUS; SUBCUTANEOUS EVERY 8 HOURS
Refills: 0 | Status: DISCONTINUED | OUTPATIENT
Start: 2022-11-25 | End: 2022-11-30

## 2022-11-25 RX ORDER — PROCHLORPERAZINE MALEATE 5 MG
5 TABLET ORAL ONCE
Refills: 0 | Status: COMPLETED | OUTPATIENT
Start: 2022-11-25 | End: 2022-11-26

## 2022-11-25 RX ORDER — SODIUM CHLORIDE 9 MG/ML
1000 INJECTION INTRAMUSCULAR; INTRAVENOUS; SUBCUTANEOUS
Refills: 0 | Status: DISCONTINUED | OUTPATIENT
Start: 2022-11-25 | End: 2022-11-30

## 2022-11-25 RX ORDER — ONDANSETRON 8 MG/1
4 TABLET, FILM COATED ORAL ONCE
Refills: 0 | Status: COMPLETED | OUTPATIENT
Start: 2022-11-25 | End: 2022-11-25

## 2022-11-25 RX ORDER — ONDANSETRON 8 MG/1
4 TABLET, FILM COATED ORAL EVERY 8 HOURS
Refills: 0 | Status: DISCONTINUED | OUTPATIENT
Start: 2022-11-25 | End: 2022-11-30

## 2022-11-25 RX ORDER — REMDESIVIR 5 MG/ML
200 INJECTION INTRAVENOUS EVERY 24 HOURS
Refills: 0 | Status: COMPLETED | OUTPATIENT
Start: 2022-11-25 | End: 2022-11-25

## 2022-11-25 RX ORDER — CHLORHEXIDINE GLUCONATE 213 G/1000ML
1 SOLUTION TOPICAL DAILY
Refills: 0 | Status: DISCONTINUED | OUTPATIENT
Start: 2022-11-25 | End: 2022-11-30

## 2022-11-25 RX ADMIN — SODIUM CHLORIDE 3 MILLILITER(S): 9 INJECTION INTRAMUSCULAR; INTRAVENOUS; SUBCUTANEOUS at 21:28

## 2022-11-25 RX ADMIN — MAGNESIUM OXIDE 400 MG ORAL TABLET 400 MILLIGRAM(S): 241.3 TABLET ORAL at 21:07

## 2022-11-25 RX ADMIN — REMDESIVIR 500 MILLIGRAM(S): 5 INJECTION INTRAVENOUS at 21:07

## 2022-11-25 RX ADMIN — ATORVASTATIN CALCIUM 20 MILLIGRAM(S): 80 TABLET, FILM COATED ORAL at 21:07

## 2022-11-25 RX ADMIN — Medication 1 TABLET(S): at 18:57

## 2022-11-25 RX ADMIN — NYSTATIN CREAM 1 APPLICATION(S): 100000 CREAM TOPICAL at 18:55

## 2022-11-25 RX ADMIN — Medication 101.6 MILLIGRAM(S): at 10:30

## 2022-11-25 RX ADMIN — FLUCONAZOLE 400 MILLIGRAM(S): 150 TABLET ORAL at 18:58

## 2022-11-25 RX ADMIN — Medication 12.5 MILLIGRAM(S): at 00:21

## 2022-11-25 RX ADMIN — MEXILETINE HYDROCHLORIDE 200 MILLIGRAM(S): 150 CAPSULE ORAL at 05:31

## 2022-11-25 RX ADMIN — Medication 4 MILLILITER(S): at 05:29

## 2022-11-25 RX ADMIN — ONDANSETRON 4 MILLIGRAM(S): 8 TABLET, FILM COATED ORAL at 00:21

## 2022-11-25 RX ADMIN — Medication 12.5 MILLIGRAM(S): at 05:29

## 2022-11-25 RX ADMIN — PANTOPRAZOLE SODIUM 40 MILLIGRAM(S): 20 TABLET, DELAYED RELEASE ORAL at 05:32

## 2022-11-25 RX ADMIN — NYSTATIN CREAM 1 APPLICATION(S): 100000 CREAM TOPICAL at 05:28

## 2022-11-25 RX ADMIN — MAGNESIUM OXIDE 400 MG ORAL TABLET 400 MILLIGRAM(S): 241.3 TABLET ORAL at 05:29

## 2022-11-25 RX ADMIN — Medication 0.5 MILLIGRAM(S): at 05:30

## 2022-11-25 RX ADMIN — MEXILETINE HYDROCHLORIDE 200 MILLIGRAM(S): 150 CAPSULE ORAL at 21:07

## 2022-11-25 RX ADMIN — Medication 3: at 08:38

## 2022-11-25 RX ADMIN — APIXABAN 5 MILLIGRAM(S): 2.5 TABLET, FILM COATED ORAL at 05:29

## 2022-11-25 RX ADMIN — SODIUM CHLORIDE 3 MILLILITER(S): 9 INJECTION INTRAMUSCULAR; INTRAVENOUS; SUBCUTANEOUS at 05:43

## 2022-11-25 RX ADMIN — Medication 50 MILLIGRAM(S): at 21:07

## 2022-11-25 RX ADMIN — Medication 3 MILLILITER(S): at 00:21

## 2022-11-25 RX ADMIN — SODIUM CHLORIDE 45 MILLILITER(S): 9 INJECTION INTRAMUSCULAR; INTRAVENOUS; SUBCUTANEOUS at 15:03

## 2022-11-25 RX ADMIN — ONDANSETRON 104 MILLIGRAM(S): 8 TABLET, FILM COATED ORAL at 15:03

## 2022-11-25 RX ADMIN — Medication 12.5 MILLIGRAM(S): at 18:57

## 2022-11-25 RX ADMIN — Medication 8 MILLIGRAM(S): at 05:29

## 2022-11-25 RX ADMIN — SODIUM CHLORIDE 3 MILLILITER(S): 9 INJECTION INTRAMUSCULAR; INTRAVENOUS; SUBCUTANEOUS at 14:25

## 2022-11-25 RX ADMIN — ONDANSETRON 4 MILLIGRAM(S): 8 TABLET, FILM COATED ORAL at 06:22

## 2022-11-25 RX ADMIN — Medication 50 MILLIGRAM(S): at 05:29

## 2022-11-25 RX ADMIN — Medication 3 MILLILITER(S): at 05:30

## 2022-11-25 RX ADMIN — CHLORHEXIDINE GLUCONATE 1 APPLICATION(S): 213 SOLUTION TOPICAL at 18:59

## 2022-11-25 RX ADMIN — Medication 3: at 15:02

## 2022-11-25 RX ADMIN — APIXABAN 5 MILLIGRAM(S): 2.5 TABLET, FILM COATED ORAL at 18:57

## 2022-11-25 RX ADMIN — MEXILETINE HYDROCHLORIDE 200 MILLIGRAM(S): 150 CAPSULE ORAL at 18:57

## 2022-11-25 NOTE — CONSULT NOTE ADULT - SUBJECTIVE AND OBJECTIVE BOX
Wound Surgery Consult Note:    HPI:  72 year-old-female with history of DMII, CAD, atrial fibrillation on eliquis, severe persistent asthma on chronic steroids, colon cancer s/p resection/chemo and recent extended hospitalization for type I aortic dissection s/p Bentall procedure and hemiarch replacement 9/6 with Dr. Cabrera. Patient presents today with 2-day history of vomiting and lethargy. Per daughter at bedside, patient was noted to be more lethargic today, and she lasted saw her on Monday. Patient reports that she has been feeling nauseous since Monday, and has been throwing up since then. Patient however denies chest pain, shortness of breath, abdominal pain, difficulty urinating. (24 Nov 2022 21:51)    Request for wound care consult for sacral/bilateral buttocks and Right elbow wounds received from nursing. Ms. Bloom is well known to the wound care team from prior admission during which she was followed for a sacral/bilateral buttocks and RIght elbow wounds. She also has foot wounds which were followed by the wound care team Podiatrists, Pj Hutton/Kate/Nicolas who followed her during her prior admission. She is incontinent of urine but has an ostomy diverting fecal effluent. She complains of nausea and vomiting and was dry heaving during my visit. She stated that she does have some discomfort/pain associated with her sacral/bialteral buttocks wound. She denies pain related to her right elbow wound.     The Right elbow wound was precipitated by right olecranon bursal effusion which was drained by IR. The wound has granulated to skin level but there appears to be a small amount of yellow drainage.    PAST MEDICAL & SURGICAL HISTORY:  Atrial fibrillation  paroxysmal, on eliquis  Diabetes, Type 2  COPD (chronic obstructive pulmonary disease)  Adrenal insufficiency, Medrol daily for over 50 years  Aortic insufficiency, moderate AR on echo 5/3/2018  Pelvic fracture  Asthma  Tracheobronchomalacia  diagnosed 2015, s/p bronchial thermoplasty 2016 (Dr Zapien); recent bronchoscopy 6/5/2018 revealed no evidence of tracheobronchomalacia in trachea or bronchial tubes  Colorectal cancer, 4/2018- last treatment , chemo and radiation  Rectal bleeding  Seizure, x 1 1/7/18  DVT (deep venous thrombosis), 15-20 years ago, took coumadin  TIA (transient ischemic attack), multiple, last 5 years ago - presents as right-sided weakness  History of partial hysterectomy, 30 years ago - fibroids  H/O total knee replacement, bilateral, 5 years ago  History of sinus surgery, multiple sinus surgeries  Exostosis of orbit, left, 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  H/O pelvic surgery, 5 years ago - s/p fracture  History of tracheomalacia  2015 - attempted tracheal stenting ([facility])- course complicated by obstruction, respiratory failure, multiple CPR attempts -  stent discontinued; 10/20/2016 Tracheobronchoplasty (Prolene Mesh) performed at [facility] by Dr Zapien  S/P bronchoscopy, 6/5/2018 - [facility] (Dr Zapien) no evidence of tracheobronchomalacia in trachea or bronchial tubes  Rectal bleeding, exam under anesthesia ([facility]) 2/2018    REVIEW OF SYSTEMS:    Constitutional:     [x ] negative [ ] fevers [ ] chills [ ] weight loss [ ] weight gain  HEENT:                  [ ] negative [ ] dry eyes [ ] eye irritation [ ] postnasal drip [ ] nasal congestion [x] Left eye prosthesis  CV:                         [x ] negative  [ ] chest pain [ ] orthopnea [ ] palpitations [ ] tachycardia  Resp:                     [ ] negative [ x] cough [ ] shortness of breath [ ] dyspnea [ ] wheezing [ x] sputum [ ] hemoptysis  GI:                          [ ] negative [ x] nausea [ x] vomiting [ ] diarrhea [ ] constipation [ ] abd pain [ ] dysphagia [x ] ostomy  :                        [ ] negative [ ] dysuria [ ] nocturia [ ] hematuria [ ] increased urinary frequency [ x] incontinent of urine  Musculoskeletal:     [ ] negative [x ] back pain [ ] myalgias [ ] arthralgias [ ] fracture [ ] ambulatory  Skin:                       [ ] negative [ ] rash [ ] itch [ x] wound [ ] skin discoloration  Neurological:        [x ] negative [ ] headache [ ] dizziness [ ] syncope [ ] weakness [ ] numbness  Psychiatric:           [x ] negative [ ] anxiety [ ] depression  Endocrine:            [ ] negative [ x] diabetes [ x] thyroid problem  Heme/Lymph:      [x ] negative [ ] anemia [ ] bleeding problem  Allergic/Immune: [x ] negative [ ] itchy eyes [ ] nasal discharge [ ] hives [ ] angioedema    [x ] All other systems negative    MEDICATIONS  (STANDING):  acetylcysteine 10%  Inhalation 4 milliLiter(s) Inhalation every 12 hours  albuterol/ipratropium for Nebulization 3 milliLiter(s) Nebulizer every 6 hours  apixaban 5 milliGRAM(s) Oral every 12 hours  ascorbic acid 500 milliGRAM(s) Oral daily  atorvastatin 20 milliGRAM(s) Oral at bedtime  buDESOnide    Inhalation Suspension 0.5 milliGRAM(s) Inhalation two times a day  chlorhexidine 2% Cloths 1 Application(s) Topical daily  collagenase Ointment 1 Application(s) Topical daily  fluconAZOLE   Tablet 400 milliGRAM(s) Oral daily  hydrALAZINE 50 milliGRAM(s) Oral three times a day  insulin lispro (ADMELOG) corrective regimen sliding scale   SubCutaneous three times a day before meals  insulin lispro (ADMELOG) corrective regimen sliding scale   SubCutaneous at bedtime  magnesium oxide 400 milliGRAM(s) Oral every 8 hours  methylPREDNISolone 8 milliGRAM(s) Oral daily  metoprolol tartrate 12.5 milliGRAM(s) Oral two times a day  mexiletine 200 milliGRAM(s) Oral every 8 hours  multivitamin 1 Tablet(s) Oral daily  nystatin Powder 1 Application(s) Topical two times a day  pantoprazole    Tablet 40 milliGRAM(s) Oral before breakfast  remdesivir  IVPB 200 milliGRAM(s) IV Intermittent every 24 hours  remdesivir  IVPB   IV Intermittent   sodium chloride 0.9% lock flush 3 milliLiter(s) IV Push every 8 hours  sodium chloride 0.9%. 1000 milliLiter(s) (45 mL/Hr) IV Continuous <Continuous>  trimethoprim   80 mG/sulfamethoxazole 400 mG 1 Tablet(s) Oral daily    MEDICATIONS  (PRN):  calamine/zinc oxide Lotion 1 Application(s) Topical every 8 hours PRN Itching  ondansetron  IVPB 4 milliGRAM(s) IV Intermittent every 8 hours PRN Nausea  prochlorperazine   IVPB 5 milliGRAM(s) IV Intermittent once PRN naueas    Allergies    aspirin (Short breath)  Avelox (Short breath; Pruritus)  cefepime (Anaphylaxis)  codeine (Short breath)  Dilaudid (Short breath)  iodine (Short breath; Swelling)  penicillin (Anaphylaxis)  shellfish (Anaphylaxis)  tetanus toxoid (Short breath)  Valium (Short breath)    Intolerances    SOCIAL HISTORY:      FAMILY HISTORY:  Family history of asthma  Family history of breast cancer (Sibling)  Family history of diabetes mellitus type II    Vital Signs Last 24 Hrs  T(C): 36.5 (25 Nov 2022 14:33), Max: 37.4 (25 Nov 2022 05:26)  T(F): 97.7 (25 Nov 2022 14:33), Max: 99.3 (25 Nov 2022 05:26)  HR: 81 (25 Nov 2022 14:33) (69 - 104)  BP: 112/73 (25 Nov 2022 14:33) (112/73 - 145/81)  BP(mean): --  RR: 18 (25 Nov 2022 14:33) (16 - 20)  SpO2: 99% (25 Nov 2022 14:33) (94% - 99%)    Parameters below as of 25 Nov 2022 14:33  Patient On (Oxygen Delivery Method): nasal cannula  O2 Flow (L/min): 2    Physical Exam:  General: Alert, thin  Ophthamology: sclera clear  ENMT: moist mucous membranes, trachea midline  Respiratory: equal chest rise with respirations  Gastrointestinal: soft NT/ND, ostomy pouch  Neurology: minimally verbal, following commands  Psych: calm, appropriate  Musculoskeletal: no contractures  Vascular: BLE edema equal  Skin:  Sacral/bilateral buttocks with superficially denuded skin in and around the gluteal cleft with central deep maroon discoloration L 2cm X W 2cm x D 0.1cm with pink wound bed, no necrotic tissue, and scant serosanguinous drainage  Right elbow wound - L3.5cm x W 3.5cm x D 0.1cm wound bed is 100% pink, no necrotic tissue, small amount of yellowish drainage  No odor, erythema, increased warmth, tenderness, induration, fluctuance    LABS:  11-25    138  |  100  |  17  ----------------------------<  316<H>  4.8   |  22  |  0.69    Ca    10.3      25 Nov 2022 10:05  Phos  3.6     11-24  Mg     1.9     11-24    TPro  7.9  /  Alb  3.5  /  TBili  0.3  /  DBili  x   /  AST  24  /  ALT  23  /  AlkPhos  117  11-25                          12.8   14.54 )-----------( 234      ( 25 Nov 2022 10:05 )             43.3     PT/INR - ( 25 Nov 2022 15:55 )   PT: 17.6 sec;   INR: 1.51 ratio         PTT - ( 25 Nov 2022 10:05 )  PTT:38.2 sec      RADIOLOGY & ADDITIONAL STUDIES:    [facility] [employer] [facility]                          PROCEDURE DATE:  10/20/2022      INTERPRETATION:  CLINICAL INFORMATION: Elbow collection drainage.   Evaluate for reaccumulation.    TECHNIQUE: Limited sonographic evaluation of the right upper extremity   was performed about the elbow to evaluate the collection of interest.    FINDINGS:  Interval drainage of the right elbow collection.  The collection now measures 6.4 x 2.4 x 3.3 cm and previously measured   4.4 x 1.6 x 3.7 cm.  There is increased complexity with internal echoes. The collection is   deformable with probe pressure.    IMPRESSION:  Reaccumulation of the collection about the right elbow which now measures   up to 6.4 cm. Interval development of internal echoes may represent a   hemorrhagic component.    EXAM:  IR PROCEDURE NON PICC                          PROCEDURE DATE:  10/13/2022          INTERPRETATION:  Procedure: Ultrasound-guided right elbow fluid   collection aspiration. Images saved to PACS.    Clinical Information: 72-year-old female with right elbow fluid   collection and sepsis    Technique: Informed consent was obtained. The procedure was performed at   bedside. The right elbow was prepped and draped in the usual sterile   fashion. Timeout was performed. 1% lidocaine was used for local   anesthesia.    Under ultrasound guidance, an 5 [employer] centesis needle was advanced   percutaneously into the right elbow fluid collection. Aspiration of the   catheter yielded 20cc of sanguinous fluid, which was sent for culture.    Impression: Successful aspiration of a right elbow fluid collection    Plan: Follow-up cultures    [facility] [employer] [facility]                          PROCEDURE DATE:  10/12/2022      INTERPRETATION:  CLINICAL INFORMATION: Right elbow wound, assess for   abscess; MRSA blood cultures.    TECHNIQUE: Focused sonographic evaluation of the elbow and forearm was   performed. Grayscale and color Doppler images were acquired.    COMPARISON: No prior relevant imaging for comparison.    FINDINGS:    Right distal posterior arm not well-defined complex collection deep to   subcutaneous fat and along the dorsal surface of the elbow joint,   approximately measuring 4.4 x 1.6 x 3.7cm. Fluid collection is deformable   with probe pressure.    Proximal forearm soft tissue edema.    IMPRESSION:  Findings consistent with right olecranon bursal effusion, likely infected.    Cultures:    Right elbow Abscess culture 10.13.22  few Proteus mirabilis      EXAM:  DUPLEX SCAN EXT VEINS UPPER BI                          PROCEDURE DATE:  10/05/2022          INTERPRETATION:  CLINICAL INFORMATION: 72-year-old female, edema, exclude   DVT.    COMPARISON: Bilateral upper extremities venous duplex 9/30/2022 and   9/12/2022.    TECHNIQUE: Duplex sonography of the BILATERAL upper extremity veins with   color and spectral Doppler, with and without compression.    FINDINGS:    RIGHT:    As before, occluded right internal jugular vein with thrombus extending   to the brachiocephalic vein.    The right subclavian, axillary and brachial veins are patent and   compressible where applicable.    The right basilic and cephalic veins (superficial veins) are patent and   without thrombus.    LEFT:    Limited evaluation of the internal jugular vein and brachiocephalic vein   secondary to vascular catheters and overlying dressings.    Internal echogenicity and lack of compression of the cephalic vein, a   superficial vein, to the level of the wrist.    The left subclavian, axillary and brachial veins are patent and   compressible where applicable.  The basilic vein (superficial vein) is   patent and without thrombus.    Doppler examination shows normal spontaneous and phasic flow.    IMPRESSION:    As before, there is an occluded RIGHT IJ vein with thrombosis extending   to brachiocephalic vein.    Superficial thrombophlebitis of the LEFT cephalic vein.    Cultures:

## 2022-11-25 NOTE — PROGRESS NOTE ADULT - CONVERSATION DETAILS
Acute hypoxemic respiratory failure requiring intubation  Type A Aortic Dissection  Severe Persistent Asthma  History of tracheal malacia

## 2022-11-25 NOTE — PROGRESS NOTE ADULT - SUBJECTIVE AND OBJECTIVE BOX
VITAL SIGNS    Telemetry:  SR 90  Vital Signs Last 24 Hrs  T(C): 37.4 (11-25-22 @ 05:26), Max: 37.4 (11-25-22 @ 05:26)  T(F): 99.3 (11-25-22 @ 05:26), Max: 99.3 (11-25-22 @ 05:26)  HR: 103 (11-25-22 @ 05:26) (69 - 104)  BP: 138/84 (11-25-22 @ 05:26) (128/67 - 145/81)  RR: 17 (11-25-22 @ 05:26) (16 - 20)  SpO2: 99% (11-25-22 @ 05:26) (94% - 99%)            11-24 @ 07:01  -  11-25 @ 07:00  --------------------------------------------------------  IN: 50 mL / OUT: 250 mL / NET: -200 mL    11-25 @ 07:01  -  11-25 @ 13:19  --------------------------------------------------------  IN: 60 mL / OUT: 0 mL / NET: 60 mL       Daily Height in cm: 170.18 (24 Nov 2022 17:45)    Daily   Admit Wt: Drug Dosing Weight  Height (cm): 170.2 (24 Nov 2022 17:45)  Weight (kg): 62.097 (24 Nov 2022 17:45)  BMI (kg/m2): 21.4 (24 Nov 2022 17:45)  BSA (m2): 1.72 (24 Nov 2022 17:45)    Bilirubin Total, Serum: 0.3 mg/dL (11-25 @ 10:05)  Bilirubin Total, Serum: 0.3 mg/dL (11-24 @ 20:00)    CAPILLARY BLOOD GLUCOSE      POCT Blood Glucose.: 268 mg/dL (25 Nov 2022 08:20)          MEDICATIONS  acetylcysteine 10%  Inhalation 4 milliLiter(s) Inhalation every 12 hours  albuterol/ipratropium for Nebulization 3 milliLiter(s) Nebulizer every 6 hours  apixaban 5 milliGRAM(s) Oral every 12 hours  ascorbic acid 500 milliGRAM(s) Oral daily  atorvastatin 20 milliGRAM(s) Oral at bedtime  buDESOnide    Inhalation Suspension 0.5 milliGRAM(s) Inhalation two times a day  calamine/zinc oxide Lotion 1 Application(s) Topical every 8 hours PRN  collagenase Ointment 1 Application(s) Topical daily  fluconAZOLE   Tablet 400 milliGRAM(s) Oral daily  hydrALAZINE 50 milliGRAM(s) Oral three times a day  insulin lispro (ADMELOG) corrective regimen sliding scale   SubCutaneous three times a day before meals  insulin lispro (ADMELOG) corrective regimen sliding scale   SubCutaneous at bedtime  magnesium oxide 400 milliGRAM(s) Oral every 8 hours  methylPREDNISolone 8 milliGRAM(s) Oral daily  metoprolol tartrate 12.5 milliGRAM(s) Oral two times a day  mexiletine 200 milliGRAM(s) Oral every 8 hours  multivitamin 1 Tablet(s) Oral daily  nystatin Powder 1 Application(s) Topical two times a day  pantoprazole    Tablet 40 milliGRAM(s) Oral before breakfast  sodium chloride 0.9% lock flush 3 milliLiter(s) IV Push every 8 hours  trimethoprim   80 mG/sulfamethoxazole 400 mG 1 Tablet(s) Oral daily      >>> <<<  PHYSICAL EXAM  Subjective: c/o nausea  Neurology: alert and oriented x 3, nonfocal, no gross deficits  CV : s1s2  Sternal Wound :  CDI , Stable  Lungs: cta  Abdomen: soft, NT,ND, ( +)BM  :  voiding  Extremities:   -c/c/e    LABS  11-25    138  |  100  |  17  ----------------------------<  316<H>  4.8   |  22  |  0.69    Ca    10.3      25 Nov 2022 10:05  Phos  3.6     11-24  Mg     1.9     11-24    TPro  7.9  /  Alb  3.5  /  TBili  0.3  /  DBili  x   /  AST  24  /  ALT  23  /  AlkPhos  117  11-25                                 12.8   14.54 )-----------( 234      ( 25 Nov 2022 10:05 )             43.3          PT/INR - ( 25 Nov 2022 10:05 )   PT: 19.8 sec;   INR: 1.70 ratio         PTT - ( 25 Nov 2022 10:05 )  PTT:38.2 sec       PAST MEDICAL & SURGICAL HISTORY:  Atrial fibrillation  paroxysmal, on eliquis      Diabetes  Type 2      COPD (chronic obstructive pulmonary disease)      Adrenal insufficiency  Medrol daily for over 50 years      Aortic insufficiency  moderate AR on echo 5/3/2018      Pelvic fracture      Asthma      Tracheobronchomalacia  diagnosed 2015, s/p bronchial thermoplasty 2016 (Dr Zapien); recent bronchoscopy 6/5/2018 revealed no evidence of tracheobronchomalacia in trachea or bronchial tubes      Colorectal cancer  4/2018- last treatment , chemo and radiation      Rectal bleeding      Seizure  x 1 1/7/18      DVT (deep venous thrombosis)  15-20 years ago, took coumadin      TIA (transient ischemic attack)  multiple, last 5 years ago - presents as right-sided weakness      History of partial hysterectomy  30 years ago - fibroids      H/O total knee replacement, bilateral  5 years ago      History of sinus surgery  multiple sinus surgeries      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      H/O pelvic surgery  5 years ago - s/p fracture      History of tracheomalacia  2015 - attempted tracheal stenting ([facility])- course complicated by obstruction, respiratory failure, multiple CPR attempts -  stent discontinued; 10/20/2016 Tracheobronchoplasty (Prolene Mesh) performed at [facility] by Dr Zapien      S/P bronchoscopy  6/5/2018 - [place] (Dr Zapien) no evidence of tracheobronchomalacia in trachea or bronchial tubes      Rectal bleeding  exam under anesthesia ([facility]) 2/2018            VITAL SIGNS    Telemetry:  SR 90  Vital Signs Last 24 Hrs  T(C): 37.4 (11-25-22 @ 05:26), Max: 37.4 (11-25-22 @ 05:26)  T(F): 99.3 (11-25-22 @ 05:26), Max: 99.3 (11-25-22 @ 05:26)  HR: 103 (11-25-22 @ 05:26) (69 - 104)  BP: 138/84 (11-25-22 @ 05:26) (128/67 - 145/81)  RR: 17 (11-25-22 @ 05:26) (16 - 20)  SpO2: 99% (11-25-22 @ 05:26) (94% - 99%)            11-24 @ 07:01  -  11-25 @ 07:00  --------------------------------------------------------  IN: 50 mL / OUT: 250 mL / NET: -200 mL    11-25 @ 07:01  -  11-25 @ 13:19  --------------------------------------------------------  IN: 60 mL / OUT: 0 mL / NET: 60 mL       Daily Height in cm: 170.18 (24 Nov 2022 17:45)    Daily   Admit Wt: Drug Dosing Weight  Height (cm): 170.2 (24 Nov 2022 17:45)  Weight (kg): 62.097 (24 Nov 2022 17:45)  BMI (kg/m2): 21.4 (24 Nov 2022 17:45)  BSA (m2): 1.72 (24 Nov 2022 17:45)    Bilirubin Total, Serum: 0.3 mg/dL (11-25 @ 10:05)  Bilirubin Total, Serum: 0.3 mg/dL (11-24 @ 20:00)    CAPILLARY BLOOD GLUCOSE      POCT Blood Glucose.: 268 mg/dL (25 Nov 2022 08:20)          MEDICATIONS  acetylcysteine 10%  Inhalation 4 milliLiter(s) Inhalation every 12 hours  albuterol/ipratropium for Nebulization 3 milliLiter(s) Nebulizer every 6 hours  apixaban 5 milliGRAM(s) Oral every 12 hours  ascorbic acid 500 milliGRAM(s) Oral daily  atorvastatin 20 milliGRAM(s) Oral at bedtime  buDESOnide    Inhalation Suspension 0.5 milliGRAM(s) Inhalation two times a day  calamine/zinc oxide Lotion 1 Application(s) Topical every 8 hours PRN  collagenase Ointment 1 Application(s) Topical daily  fluconAZOLE   Tablet 400 milliGRAM(s) Oral daily  hydrALAZINE 50 milliGRAM(s) Oral three times a day  insulin lispro (ADMELOG) corrective regimen sliding scale   SubCutaneous three times a day before meals  insulin lispro (ADMELOG) corrective regimen sliding scale   SubCutaneous at bedtime  magnesium oxide 400 milliGRAM(s) Oral every 8 hours  methylPREDNISolone 8 milliGRAM(s) Oral daily  metoprolol tartrate 12.5 milliGRAM(s) Oral two times a day  mexiletine 200 milliGRAM(s) Oral every 8 hours  multivitamin 1 Tablet(s) Oral daily  nystatin Powder 1 Application(s) Topical two times a day  pantoprazole    Tablet 40 milliGRAM(s) Oral before breakfast  sodium chloride 0.9% lock flush 3 milliLiter(s) IV Push every 8 hours  trimethoprim   80 mG/sulfamethoxazole 400 mG 1 Tablet(s) Oral daily      >>> <<<  PHYSICAL EXAM  Subjective: c/o nausea  Neurology: alert and oriented x 3, nonfocal, no gross deficits  CV : s1s2  Sternal Wound :  CDI , Stable  Lungs: cta  Abdomen: soft, NT,ND, ( +)BM  :  voiding  Extremities:   -c/c/e   Sacral/bilateral buttocks with superficially denuded skin to gluteal cleft with discoloration L 2cm X W 2cm x D 0.1cm with pink wound bed,  scant serosanguinous drainage  Right elbow wound - L3.5cm x W 3.5cm x D 0.1cm wound bed, no necrotic tissue,  LABS  11-25    138  |  100  |  17  ----------------------------<  316<H>  4.8   |  22  |  0.69    Ca    10.3      25 Nov 2022 10:05  Phos  3.6     11-24  Mg     1.9     11-24    TPro  7.9  /  Alb  3.5  /  TBili  0.3  /  DBili  x   /  AST  24  /  ALT  23  /  AlkPhos  117  11-25                                 12.8   14.54 )-----------( 234      ( 25 Nov 2022 10:05 )             43.3          PT/INR - ( 25 Nov 2022 10:05 )   PT: 19.8 sec;   INR: 1.70 ratio         PTT - ( 25 Nov 2022 10:05 )  PTT:38.2 sec       PAST MEDICAL & SURGICAL HISTORY:  Atrial fibrillation  paroxysmal, on eliquis      Diabetes  Type 2      COPD (chronic obstructive pulmonary disease)      Adrenal insufficiency  Medrol daily for over 50 years      Aortic insufficiency  moderate AR on echo 5/3/2018      Pelvic fracture      Asthma      Tracheobronchomalacia  diagnosed 2015, s/p bronchial thermoplasty 2016 (Dr Zapien); recent bronchoscopy 6/5/2018 revealed no evidence of tracheobronchomalacia in trachea or bronchial tubes      Colorectal cancer  4/2018- last treatment , chemo and radiation      Rectal bleeding      Seizure  x 1 1/7/18      DVT (deep venous thrombosis)  15-20 years ago, took coumadin      TIA (transient ischemic attack)  multiple, last 5 years ago - presents as right-sided weakness      History of partial hysterectomy  30 years ago - fibroids      H/O total knee replacement, bilateral  5 years ago      History of sinus surgery  multiple sinus surgeries      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      H/O pelvic surgery  5 years ago - s/p fracture      History of tracheomalacia  2015 - attempted tracheal stenting ([facility])- course complicated by obstruction, respiratory failure, multiple CPR attempts -  stent discontinued; 10/20/2016 Tracheobronchoplasty (Prolene Mesh) performed at [facility] by Dr Zapien      S/P bronchoscopy  6/5/2018 - [place] (Dr Zapien) no evidence of tracheobronchomalacia in trachea or bronchial tubes      Rectal bleeding  exam under anesthesia ([facility]) 2/2018

## 2022-11-25 NOTE — CONSULT NOTE ADULT - ASSESSMENT
Impression:    Sacral/bilateral Buttocks deep tissue injury present on admission  Right elbow non-healing wound  Incontinence of bladder  Incontinence Dermatitis  Ostomy status    Recommend:  1.) topical therapy: Right elbow wound - cleanse with NS, pat dry, apply silvadene ointment BID cover with nonwoven gauze, secure with tape  sacral/buttock injury - cleanse with incontinence cleanser, pat dry, apply Triad ointment BID and PRN for incontinent episodes  2.) Incontinence Management - incontinence cleanser, pads, pericare BID, continue external female urinary catheter  3.) Maintain on an alternating air with low air loss surface  4.) Turn and reposition Q 2 hours  5.) Nutrition optimization - please add Isidro  6.) Offload heels/feet with complete cair air fluidized boots; ensure that the soles of the feet are not resting on the foot board of the bed.  7.) Wound Care Team Podiatrists, Pj Hutton/Kate/Nicolas will see patient for foot wounds  8.) routine ostomy management    Care as per medicine. Will follow  Upon discharge f/u as outpatient at [facility] [address] [place] [address] [phone]  Thank you for this consult  Olamide Lewis, ALLEY-C, CWOCN [id number] Impression:    Sacral/bilateral Buttocks deep tissue injury present on admission  Right elbow non-healing wound  Incontinence of bladder  Incontinence Dermatitis  Ostomy status    Recommend:  1.) topical therapy: Right elbow wound - cleanse with NS, pat dry, apply silvadene ointment BID cover with nonwoven gauze, secure with tape  sacral/buttock injury - cleanse with incontinence cleanser, pat dry, apply Triad ointment BID and PRN for incontinent episodes  2.) Incontinence Management - incontinence cleanser, pads, pericare BID, continue external female urinary catheter  3.) Maintain on an alternating air with low air loss surface  4.) Turn and reposition Q 2 hours  5.) Nutrition optimization - please add Isidro  6.) Offload heels/feet with complete cair air fluidized boots; ensure that the soles of the feet are not resting on the foot board of the bed.  7.) Wound Care Team Podiatrists, Pj Hutton/Kate/Nicolas will see patient for foot wounds  8.) routine ostomy management  9.) glycemic control - continue insulin SS    Care as per medicine. Will follow  Upon discharge f/u as outpatient at [facility] [address] [place] [address] [phone]  Thank you for this consult  Olamide Lewis, NP-C, CWOCN [id number]

## 2022-11-25 NOTE — PROGRESS NOTE ADULT - ASSESSMENT
72 year-old-female with history of DMII, CAD, atrial fibrillation on eliquis, severe persistent asthma on chronic steroids, colon cancer s/p resection/chemo and recent extended hospitalization for type I aortic dissection s/p Bentall procedure and hemiarch replacement 9/6 with Dr. Cabrera. Patient presents today with 2-day history of vomiting and lethargy. Per daughter at bedside, patient was noted to be more lethargic today, and she lasted saw her on Monday. Patient reports that she has been feeling nauseous since Monday,    11/25 Pt covid + on admission from Rehab. ID and Pulmonary consulted for assistance in management with covid, asthma and history of tracheal malacia. Will initiate remdesivir

## 2022-11-25 NOTE — PROGRESS NOTE ADULT - ASSESSMENT
72-year-old female with a past medical history most significant for recent hospitalization for type I aortic dissection status post Bentall procedure and Gustavo arch replacement who is being admitted to the hospital due to nausea and vomiting.    #SARS-CoV-2/COVID-19 infection  #Acute hypoxic respiratory failure  Requires 2 L supplemental oxygen on admission, previously was intubated during previous hospitalization however discharged on room air, patient denies requiring oxygen otherwise    #Leukocytosis, resolved    #History of MRSA bacteremia and candidemia    Recommendations  Continue remdesivir for planned course of 5 days  Wean oxygen as able, if on room air can discontinue remdesivir  Caution with steroids given recent vascular surgery  Obtain LDH, D-dimer, CRP, ferritin  Maintain strict isolation precautions  Follow fever curve and WBC count    Eyal Gonzalez MD  Division of Infectious Diseases  Available on Teams

## 2022-11-25 NOTE — PROGRESS NOTE ADULT - PROBLEM SELECTOR PLAN 1
Pt covid + on admission from Rehab. ID and Pulmonary consulted for assistance in management with covid, asthma and history of tracheal malacia. Will initiate remdesivir

## 2022-11-25 NOTE — CONSULT NOTE ADULT - ASSESSMENT
assessment/plan:    Chronic ulceration right medial midfoot: stable, noninfected, present on admission.  Left heel ulcer: Noninfected, present remission.  Diabetes mellitus    Recommend normal saline cleanse with mupirocin ointment and DSD to the right foot ulceration daily.  Recommend continue decubitus precautions and use of Z flow boots.  Recommend mupirocin and allevyn foam to left heel  We will continue to monitor for signs of infection and wound care recommendations while in house

## 2022-11-25 NOTE — PROGRESS NOTE ADULT - SUBJECTIVE AND OBJECTIVE BOX
Patient is a 72y old  Female who presents with a chief complaint of Nausea/ Lethargy (25 Nov 2022 17:39)    HPI:  72-year-old female with a past medical history most significant for recent hospitalization for type I aortic dissection status post Bentall procedure and Gustavo arch replacement who is being admitted to the hospital due to nausea and vomiting.    Patient was discharged on 11/22/2022 after prolonged hospitalization following type a aortic dissection repair.  Patient was discharged to rehab however shortly after arrival noted to have worsening lethargy, nausea and vomiting.  Presenting to the hospital again.    Upon admission, SARS-CoV-2 positive.  Otherwise has been afebrile.  Requiring 2 L supplemental oxygen.  Latest labs show no leukocytosis previously max 14.5, CMP unremarkable except for hyperglycemia.  X-ray did not show evidence for pulmonary consolidation/infection, trace pleural effusion was noted.  Blood cultures obtained and are pending.    Patient known to ID services from previous history where she was treated for MRSA bacteremia, candidemia and right elbow infection with ESBL Proteus.  Patient was treated with 6 weeks of IV fluconazole and IV vancomycin and then placed on chronic suppressive fluconazole and TMP–SMX.  Patient was treated for the ESBL Proteus with a course of meropenem.      prior hospital charts reviewed [  x]  primary team notes reviewed [x  ]  other consultant notes reviewed [ x ]    PAST MEDICAL & SURGICAL HISTORY:  Atrial fibrillation  paroxysmal, on eliquis      Diabetes  Type 2      COPD (chronic obstructive pulmonary disease)      Adrenal insufficiency  Medrol daily for over 50 years      Aortic insufficiency  moderate AR on echo 5/3/2018      Pelvic fracture      Asthma      Tracheobronchomalacia  diagnosed 2015, s/p bronchial thermoplasty 2016 (Dr Zapien); recent bronchoscopy 6/5/2018 revealed no evidence of tracheobronchomalacia in trachea or bronchial tubes      Colorectal cancer  4/2018- last treatment , chemo and radiation      Rectal bleeding      Seizure  x 1 1/7/18      DVT (deep venous thrombosis)  15-20 years ago, took coumadin      TIA (transient ischemic attack)  multiple, last 5 years ago - presents as right-sided weakness      History of partial hysterectomy  30 years ago - fibroids      H/O total knee replacement, bilateral  5 years ago      History of sinus surgery  multiple sinus surgeries      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      H/O pelvic surgery  5 years ago - s/p fracture      History of tracheomalacia  2015 - attempted tracheal stenting ([facility])- course complicated by obstruction, respiratory failure, multiple CPR attempts -  stent discontinued; 10/20/2016 Tracheobronchoplasty (Prolene Mesh) performed at [facility] by Dr Zapien      S/P bronchoscopy  6/5/2018 - [place] (Dr Zapien) no evidence of tracheobronchomalacia in trachea or bronchial tubes      Rectal bleeding  exam under anesthesia ([facility]) 2/2018          Allergies  aspirin (Short breath)  Avelox (Short breath; Pruritus)  cefepime (Anaphylaxis)  codeine (Short breath)  Dilaudid (Short breath)  iodine (Short breath; Swelling)  penicillin (Anaphylaxis)  shellfish (Anaphylaxis)  tetanus toxoid (Short breath)  Valium (Short breath)    ANTIMICROBIALS (past 90 days)  MEDICATIONS  (STANDING):        fluconAZOLE   Tablet 400 daily  remdesivir  IVPB 200 every 24 hours  remdesivir  IVPB    trimethoprim   80 mG/sulfamethoxazole 400 mG 1 daily    MEDICATIONS  (STANDING):  acetylcysteine 10%  Inhalation 4 every 12 hours  albuterol/ipratropium for Nebulization 3 every 6 hours  apixaban 5 every 12 hours  atorvastatin 20 at bedtime  buDESOnide    Inhalation Suspension 0.5 two times a day  hydrALAZINE 50 three times a day  insulin lispro (ADMELOG) corrective regimen sliding scale  three times a day before meals  insulin lispro (ADMELOG) corrective regimen sliding scale  at bedtime  methylPREDNISolone 8 daily  metoprolol tartrate 12.5 two times a day  mexiletine 200 every 8 hours  ondansetron  IVPB 4 every 8 hours PRN  pantoprazole    Tablet 40 before breakfast  prochlorperazine   IVPB 5 once PRN    SOCIAL HISTORY:     Social History:  · Substance use	No     Tobacco Screening:  · Core Measure Site	No  · Has the patient used tobacco in the past 30 days?	No    FAMILY HISTORY:  Family history of asthma    Family history of breast cancer (Sibling)    Family history of diabetes mellitus type II      REVIEW OF SYSTEMS  [  ] ROS unobtainable because:    [x  ] All other systems negative except as noted below:	    Constitutional:  [ ] fever [ ] chills  [ ] weight loss  [ ] weakness  Skin:  [ ] rash [ ] phlebitis	  Eyes: [ ] icterus [ ] pain  [ ] discharge	  ENMT: [ ] sore throat  [ ] thrush [ ] ulcers [ ] exudates  Respiratory: [ ] dyspnea [ ] hemoptysis [ ] cough [ ] sputum	  Cardiovascular:  [ ] chest pain [ ] palpitations [ ] edema	  Gastrointestinal:  [ ] nausea [ ] vomiting [ ] diarrhea [ ] constipation [ ] pain	  Genitourinary:  [ ] dysuria [ ] frequency [ ] hematuria [ ] discharge [ ] flank pain  [ ] incontinence  Musculoskeletal:  [ ] myalgias [ ] arthralgias [ ] arthritis  [ ] back pain  Neurological:  [ ] headache [ ] seizures  [ ] confusion/altered mental status  Psychiatric:  [ ] anxiety [ ] depression	  Hematology/Lymphatics:  [ ] lymphadenopathy  Endocrine:  [ ] adrenal [ ] thyroid  Allergic/Immunologic:	 [ ] transplant [ ] seasonal    Vital Signs Last 24 Hrs  T(F): 97.7 (11-25-22 @ 14:33), Max: 99.3 (11-25-22 @ 05:26)  Vital Signs Last 24 Hrs  HR: 81 (11-25-22 @ 14:33) (81 - 104)  BP: 112/73 (11-25-22 @ 14:33) (112/73 - 138/84)  RR: 18 (11-25-22 @ 14:33)  SpO2: 99% (11-25-22 @ 14:33) (94% - 99%)  Wt(kg): --    PHYSICAL EXAM:  Constitutional: non-toxic, no distress, NC in place  HEAD/EYES: anicteric, no conjunctival injection  ENT:  supple, no thrush  Cardiovascular:   normal S1, S2, no murmur, no edema  Respiratory:  decrease breath sounds no wheezes, no rales  GI:  soft, non-tender, normal bowel sounds  :  no ramirez, no CVA tenderness  Musculoskeletal:  no synovitis, normal ROM  Neurologic: awake and alert, normal strength, no focal findings  Skin:  no rash, no erythema, no phlebitis  Heme/Onc: no lymphadenopathy   Psychiatric:  awake, alert, appropriate mood                            12.5   10.11 )-----------( 255      ( 25 Nov 2022 17:35 )             42.5   11-25    138  |  100  |  17  ----------------------------<  316<H>  4.8   |  22  |  0.69    Ca    10.3      25 Nov 2022 10:05  Phos  3.6     11-24  Mg     1.9     11-24    TPro  7.9  /  Alb  3.5  /  TBili  0.3  /  DBili  x   /  AST  24  /  ALT  23  /  AlkPhos  117  11-25    MICROBIOLOGY:        HIV-1 RNA Quantitative, Viral Load Log: NOT DET. lg /mL (10-04-22 @ 00:31)    Rapid RVP Result: Detected (11-24 @ 18:36)      RADIOLOGY:  imaging below personally reviewed and agree with findings

## 2022-11-25 NOTE — CONSULT NOTE ADULT - SUBJECTIVE AND OBJECTIVE BOX
Patient is a 72y old  Female who presents with a chief complaint of Nausea/ Lethargy (25 Nov 2022 13:18)      HPI:  72 year-old-female with history of DMII, CAD, atrial fibrillation on eliquis, severe persistent asthma on chronic steroids, colon cancer s/p resection/chemo and recent extended hospitalization for type I aortic dissection s/p Bentall procedure and hemiarch replacement 9/6 with Dr. Cabrera. Patient presents today with 2-day history of vomiting and lethargy. Per daughter at bedside, patient was noted to be more lethargic today, and she lasted saw her on Monday. Patient reports that she has been feeling nauseous since Monday, and has been throwing up since then. Patient however denies chest pain, shortness of breath, abdominal pain, difficulty urinating. (24 Nov 2022 21:51)    Podiatry consulted for bilat foot wounds.    PAST MEDICAL & SURGICAL HISTORY:  Atrial fibrillation  paroxysmal, on eliquis      Diabetes  Type 2      COPD (chronic obstructive pulmonary disease)      Adrenal insufficiency  Medrol daily for over 50 years      Aortic insufficiency  moderate AR on echo 5/3/2018      Pelvic fracture      Asthma      Tracheobronchomalacia  diagnosed 2015, s/p bronchial thermoplasty 2016 (Dr Zapien); recent bronchoscopy 6/5/2018 revealed no evidence of tracheobronchomalacia in trachea or bronchial tubes      Colorectal cancer  4/2018- last treatment , chemo and radiation      Rectal bleeding      Seizure  x 1 1/7/18      DVT (deep venous thrombosis)  15-20 years ago, took coumadin      TIA (transient ischemic attack)  multiple, last 5 years ago - presents as right-sided weakness      History of partial hysterectomy  30 years ago - fibroids      H/O total knee replacement, bilateral  5 years ago      History of sinus surgery  multiple sinus surgeries      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      H/O pelvic surgery  5 years ago - s/p fracture      History of tracheomalacia  2015 - attempted tracheal stenting ([facility])- course complicated by obstruction, respiratory failure, multiple CPR attempts -  stent discontinued; 10/20/2016 Tracheobronchoplasty (Prolene Mesh) performed at [facility] by Dr Zapien      S/P bronchoscopy  6/5/2018 - [place] (Dr Zapien) no evidence of tracheobronchomalacia in trachea or bronchial tubes      Rectal bleeding  exam under anesthesia ([facility]) 2/2018          MEDICATIONS  (STANDING):  acetylcysteine 10%  Inhalation 4 milliLiter(s) Inhalation every 12 hours  albuterol/ipratropium for Nebulization 3 milliLiter(s) Nebulizer every 6 hours  apixaban 5 milliGRAM(s) Oral every 12 hours  ascorbic acid 500 milliGRAM(s) Oral daily  atorvastatin 20 milliGRAM(s) Oral at bedtime  buDESOnide    Inhalation Suspension 0.5 milliGRAM(s) Inhalation two times a day  chlorhexidine 2% Cloths 1 Application(s) Topical daily  collagenase Ointment 1 Application(s) Topical daily  fluconAZOLE   Tablet 400 milliGRAM(s) Oral daily  hydrALAZINE 50 milliGRAM(s) Oral three times a day  insulin lispro (ADMELOG) corrective regimen sliding scale   SubCutaneous three times a day before meals  insulin lispro (ADMELOG) corrective regimen sliding scale   SubCutaneous at bedtime  magnesium oxide 400 milliGRAM(s) Oral every 8 hours  methylPREDNISolone 8 milliGRAM(s) Oral daily  metoprolol tartrate 12.5 milliGRAM(s) Oral two times a day  mexiletine 200 milliGRAM(s) Oral every 8 hours  multivitamin 1 Tablet(s) Oral daily  nystatin Powder 1 Application(s) Topical two times a day  pantoprazole    Tablet 40 milliGRAM(s) Oral before breakfast  remdesivir  IVPB 200 milliGRAM(s) IV Intermittent every 24 hours  remdesivir  IVPB   IV Intermittent   sodium chloride 0.9% lock flush 3 milliLiter(s) IV Push every 8 hours  sodium chloride 0.9%. 1000 milliLiter(s) (45 mL/Hr) IV Continuous <Continuous>  trimethoprim   80 mG/sulfamethoxazole 400 mG 1 Tablet(s) Oral daily    MEDICATIONS  (PRN):  calamine/zinc oxide Lotion 1 Application(s) Topical every 8 hours PRN Itching  ondansetron  IVPB 4 milliGRAM(s) IV Intermittent every 8 hours PRN Nausea  prochlorperazine   IVPB 5 milliGRAM(s) IV Intermittent once PRN naueas      Allergies    aspirin (Short breath)  Avelox (Short breath; Pruritus)  cefepime (Anaphylaxis)  codeine (Short breath)  Dilaudid (Short breath)  iodine (Short breath; Swelling)  penicillin (Anaphylaxis)  shellfish (Anaphylaxis)  tetanus toxoid (Short breath)  Valium (Short breath)    Intolerances        VITALS:    Vital Signs Last 24 Hrs  T(C): 36.5 (25 Nov 2022 14:33), Max: 37.4 (25 Nov 2022 05:26)  T(F): 97.7 (25 Nov 2022 14:33), Max: 99.3 (25 Nov 2022 05:26)  HR: 81 (25 Nov 2022 14:33) (81 - 104)  BP: 112/73 (25 Nov 2022 14:33) (112/73 - 138/84)  BP(mean): --  RR: 18 (25 Nov 2022 14:33) (16 - 20)  SpO2: 99% (25 Nov 2022 14:33) (94% - 99%)    Parameters below as of 25 Nov 2022 14:33  Patient On (Oxygen Delivery Method): nasal cannula  O2 Flow (L/min): 2      LABS:                          12.5   10.11 )-----------( 255      ( 25 Nov 2022 17:35 )             42.5       11-25    138  |  100  |  17  ----------------------------<  316<H>  4.8   |  22  |  0.69    Ca    10.3      25 Nov 2022 10:05  Phos  3.6     11-24  Mg     1.9     11-24    TPro  7.9  /  Alb  3.5  /  TBili  0.3  /  DBili  x   /  AST  24  /  ALT  23  /  AlkPhos  117  11-25      CAPILLARY BLOOD GLUCOSE      POCT Blood Glucose.: 281 mg/dL (25 Nov 2022 14:18)  POCT Blood Glucose.: 268 mg/dL (25 Nov 2022 08:20)      PT/INR - ( 25 Nov 2022 15:55 )   PT: 17.6 sec;   INR: 1.51 ratio         PTT - ( 25 Nov 2022 10:05 )  PTT:38.2 sec    LOWER EXTREMITY PHYSICAL EXAM:    Vasular: DP/PT 1_/4, B/L, CFT <_2 seconds B/L, Temperature gradient _WNL, B/L.   Neuro: Epicritic sensation _diminished to the level of toes_, B/L.  Skin: left heel ulcer to subQ 0.5 cm in diameter secondary to pressure present on admission.  No fluctuance or clinical signs of infection left lower extremity  Wound #1:   Location: right medial midfoot.  Size: 0.7 cm in diameter  Depth: 0.2 cm in depth  Wound bed: red granular tissue  Drainage: nonfluctuant/none  Odor: none  Periwound:no clinical signs of infection  Etiology: present on admission

## 2022-11-26 LAB
ALBUMIN SERPL ELPH-MCNC: 3.3 G/DL — SIGNIFICANT CHANGE UP (ref 3.3–5)
ALP SERPL-CCNC: 101 U/L — SIGNIFICANT CHANGE UP (ref 40–120)
ALT FLD-CCNC: 17 U/L — SIGNIFICANT CHANGE UP (ref 10–45)
ANION GAP SERPL CALC-SCNC: 16 MMOL/L — SIGNIFICANT CHANGE UP (ref 5–17)
AST SERPL-CCNC: 20 U/L — SIGNIFICANT CHANGE UP (ref 10–40)
BILIRUB SERPL-MCNC: 0.2 MG/DL — SIGNIFICANT CHANGE UP (ref 0.2–1.2)
BUN SERPL-MCNC: 17 MG/DL — SIGNIFICANT CHANGE UP (ref 7–23)
CALCIUM SERPL-MCNC: 9.4 MG/DL — SIGNIFICANT CHANGE UP (ref 8.4–10.5)
CHLORIDE SERPL-SCNC: 102 MMOL/L — SIGNIFICANT CHANGE UP (ref 96–108)
CO2 SERPL-SCNC: 23 MMOL/L — SIGNIFICANT CHANGE UP (ref 22–31)
CREAT SERPL-MCNC: 0.55 MG/DL — SIGNIFICANT CHANGE UP (ref 0.5–1.3)
D DIMER BLD IA.RAPID-MCNC: 506 NG/ML DDU — HIGH
EGFR: 96 ML/MIN/1.73M2 — SIGNIFICANT CHANGE UP
FERRITIN SERPL-MCNC: 177 NG/ML — HIGH (ref 15–150)
GLUCOSE BLDC GLUCOMTR-MCNC: 187 MG/DL — HIGH (ref 70–99)
GLUCOSE BLDC GLUCOMTR-MCNC: 241 MG/DL — HIGH (ref 70–99)
GLUCOSE BLDC GLUCOMTR-MCNC: 277 MG/DL — HIGH (ref 70–99)
GLUCOSE BLDC GLUCOMTR-MCNC: 293 MG/DL — HIGH (ref 70–99)
GLUCOSE SERPL-MCNC: 267 MG/DL — HIGH (ref 70–99)
HCT VFR BLD CALC: 38 % — SIGNIFICANT CHANGE UP (ref 34.5–45)
HGB BLD-MCNC: 11.1 G/DL — LOW (ref 11.5–15.5)
LDH SERPL L TO P-CCNC: 214 U/L — SIGNIFICANT CHANGE UP (ref 50–242)
MCHC RBC-ENTMCNC: 22.5 PG — LOW (ref 27–34)
MCHC RBC-ENTMCNC: 29.2 GM/DL — LOW (ref 32–36)
MCV RBC AUTO: 77.1 FL — LOW (ref 80–100)
NRBC # BLD: 0 /100 WBCS — SIGNIFICANT CHANGE UP (ref 0–0)
PLATELET # BLD AUTO: 227 K/UL — SIGNIFICANT CHANGE UP (ref 150–400)
POTASSIUM SERPL-MCNC: 4.4 MMOL/L — SIGNIFICANT CHANGE UP (ref 3.5–5.3)
POTASSIUM SERPL-SCNC: 4.4 MMOL/L — SIGNIFICANT CHANGE UP (ref 3.5–5.3)
PROT SERPL-MCNC: 6.9 G/DL — SIGNIFICANT CHANGE UP (ref 6–8.3)
RBC # BLD: 4.93 M/UL — SIGNIFICANT CHANGE UP (ref 3.8–5.2)
RBC # FLD: 19 % — HIGH (ref 10.3–14.5)
SODIUM SERPL-SCNC: 141 MMOL/L — SIGNIFICANT CHANGE UP (ref 135–145)
WBC # BLD: 9.05 K/UL — SIGNIFICANT CHANGE UP (ref 3.8–10.5)
WBC # FLD AUTO: 9.05 K/UL — SIGNIFICANT CHANGE UP (ref 3.8–10.5)

## 2022-11-26 PROCEDURE — 99232 SBSQ HOSP IP/OBS MODERATE 35: CPT | Mod: 24

## 2022-11-26 PROCEDURE — 74018 RADEX ABDOMEN 1 VIEW: CPT | Mod: 26

## 2022-11-26 PROCEDURE — 99233 SBSQ HOSP IP/OBS HIGH 50: CPT | Mod: 24

## 2022-11-26 RX ADMIN — Medication 1 TABLET(S): at 08:31

## 2022-11-26 RX ADMIN — Medication 1 APPLICATION(S): at 06:22

## 2022-11-26 RX ADMIN — Medication 12.5 MILLIGRAM(S): at 17:29

## 2022-11-26 RX ADMIN — Medication 102 MILLIGRAM(S): at 00:31

## 2022-11-26 RX ADMIN — MEXILETINE HYDROCHLORIDE 200 MILLIGRAM(S): 150 CAPSULE ORAL at 06:09

## 2022-11-26 RX ADMIN — Medication 3 MILLILITER(S): at 00:05

## 2022-11-26 RX ADMIN — SODIUM CHLORIDE 3 MILLILITER(S): 9 INJECTION INTRAMUSCULAR; INTRAVENOUS; SUBCUTANEOUS at 20:50

## 2022-11-26 RX ADMIN — SODIUM CHLORIDE 3 MILLILITER(S): 9 INJECTION INTRAMUSCULAR; INTRAVENOUS; SUBCUTANEOUS at 13:45

## 2022-11-26 RX ADMIN — MAGNESIUM OXIDE 400 MG ORAL TABLET 400 MILLIGRAM(S): 241.3 TABLET ORAL at 06:09

## 2022-11-26 RX ADMIN — MEXILETINE HYDROCHLORIDE 200 MILLIGRAM(S): 150 CAPSULE ORAL at 22:46

## 2022-11-26 RX ADMIN — Medication 50 MILLIGRAM(S): at 06:09

## 2022-11-26 RX ADMIN — Medication 3: at 17:26

## 2022-11-26 RX ADMIN — Medication 2: at 08:30

## 2022-11-26 RX ADMIN — SODIUM CHLORIDE 45 MILLILITER(S): 9 INJECTION INTRAMUSCULAR; INTRAVENOUS; SUBCUTANEOUS at 22:56

## 2022-11-26 RX ADMIN — Medication 1 APPLICATION(S): at 08:32

## 2022-11-26 RX ADMIN — APIXABAN 5 MILLIGRAM(S): 2.5 TABLET, FILM COATED ORAL at 17:28

## 2022-11-26 RX ADMIN — Medication 50 MILLIGRAM(S): at 13:52

## 2022-11-26 RX ADMIN — Medication 3 MILLILITER(S): at 17:29

## 2022-11-26 RX ADMIN — PANTOPRAZOLE SODIUM 40 MILLIGRAM(S): 20 TABLET, DELAYED RELEASE ORAL at 06:09

## 2022-11-26 RX ADMIN — Medication 3 MILLILITER(S): at 12:41

## 2022-11-26 RX ADMIN — Medication 50 MILLIGRAM(S): at 22:21

## 2022-11-26 RX ADMIN — ONDANSETRON 104 MILLIGRAM(S): 8 TABLET, FILM COATED ORAL at 06:10

## 2022-11-26 RX ADMIN — Medication 4 MILLILITER(S): at 17:27

## 2022-11-26 RX ADMIN — ONDANSETRON 104 MILLIGRAM(S): 8 TABLET, FILM COATED ORAL at 22:54

## 2022-11-26 RX ADMIN — SODIUM CHLORIDE 45 MILLILITER(S): 9 INJECTION INTRAMUSCULAR; INTRAVENOUS; SUBCUTANEOUS at 12:42

## 2022-11-26 RX ADMIN — Medication 12.5 MILLIGRAM(S): at 06:09

## 2022-11-26 RX ADMIN — Medication 500 MILLIGRAM(S): at 08:31

## 2022-11-26 RX ADMIN — FLUCONAZOLE 400 MILLIGRAM(S): 150 TABLET ORAL at 08:31

## 2022-11-26 RX ADMIN — Medication 4 MILLILITER(S): at 06:22

## 2022-11-26 RX ADMIN — MUPIROCIN 1 APPLICATION(S): 20 OINTMENT TOPICAL at 06:22

## 2022-11-26 RX ADMIN — NYSTATIN CREAM 1 APPLICATION(S): 100000 CREAM TOPICAL at 17:30

## 2022-11-26 RX ADMIN — NYSTATIN CREAM 1 APPLICATION(S): 100000 CREAM TOPICAL at 06:22

## 2022-11-26 RX ADMIN — MAGNESIUM OXIDE 400 MG ORAL TABLET 400 MILLIGRAM(S): 241.3 TABLET ORAL at 22:20

## 2022-11-26 RX ADMIN — Medication 3 MILLILITER(S): at 06:23

## 2022-11-26 RX ADMIN — MAGNESIUM OXIDE 400 MG ORAL TABLET 400 MILLIGRAM(S): 241.3 TABLET ORAL at 13:52

## 2022-11-26 RX ADMIN — ONDANSETRON 104 MILLIGRAM(S): 8 TABLET, FILM COATED ORAL at 13:51

## 2022-11-26 RX ADMIN — Medication 1 APPLICATION(S): at 17:51

## 2022-11-26 RX ADMIN — Medication 0.5 MILLIGRAM(S): at 06:23

## 2022-11-26 RX ADMIN — Medication 8 MILLIGRAM(S): at 06:10

## 2022-11-26 RX ADMIN — REMDESIVIR 500 MILLIGRAM(S): 5 INJECTION INTRAVENOUS at 17:27

## 2022-11-26 RX ADMIN — MEXILETINE HYDROCHLORIDE 200 MILLIGRAM(S): 150 CAPSULE ORAL at 16:38

## 2022-11-26 RX ADMIN — SODIUM CHLORIDE 3 MILLILITER(S): 9 INJECTION INTRAMUSCULAR; INTRAVENOUS; SUBCUTANEOUS at 06:22

## 2022-11-26 RX ADMIN — APIXABAN 5 MILLIGRAM(S): 2.5 TABLET, FILM COATED ORAL at 06:09

## 2022-11-26 RX ADMIN — ATORVASTATIN CALCIUM 20 MILLIGRAM(S): 80 TABLET, FILM COATED ORAL at 22:21

## 2022-11-26 RX ADMIN — Medication 0.5 MILLIGRAM(S): at 17:27

## 2022-11-26 RX ADMIN — CHLORHEXIDINE GLUCONATE 1 APPLICATION(S): 213 SOLUTION TOPICAL at 16:37

## 2022-11-26 RX ADMIN — Medication 3: at 12:00

## 2022-11-26 NOTE — DIETITIAN NUTRITION RISK NOTIFICATION - TREATMENT: THE FOLLOWING DIET HAS BEEN RECOMMENDED
Diet, Pureed:   Consistent Carbohydrate {No Snacks} (CSTCHO)  DASH/TLC {Sodium & Cholesterol Restricted} (DASH)  Mildly Thick Liquids (MILDTHICKLIQS) (11-25-22 @ 00:07) [Active]

## 2022-11-26 NOTE — PROGRESS NOTE ADULT - SUBJECTIVE AND OBJECTIVE BOX
VITAL SIGNS    Telemetry:  SR 80  Vital Signs Last 24 Hrs  T(C): 36.6 (11-27-22 @ 04:51), Max: 36.7 (11-26-22 @ 18:01)  T(F): 97.9 (11-27-22 @ 04:51), Max: 98.1 (11-26-22 @ 18:01)  HR: 82 (11-27-22 @ 04:51) (76 - 89)  BP: 130/63 (11-27-22 @ 04:51) (120/76 - 141/85)  RR: 18 (11-27-22 @ 04:51) (17 - 18)  SpO2: 96% (11-27-22 @ 04:51) (96% - 99%)            11-26 @ 07:01  -  11-27 @ 07:00  --------------------------------------------------------  IN: 2210 mL / OUT: 800 mL / NET: 1410 mL    11-27 @ 07:01  -  11-27 @ 10:38  --------------------------------------------------------  IN: 240 mL / OUT: 0 mL / NET: 240 mL       Daily     Daily   Admit Wt: Drug Dosing Weight  Height (cm): 170.2 (24 Nov 2022 17:45)  Weight (kg): 62.097 (24 Nov 2022 17:45)  BMI (kg/m2): 21.4 (24 Nov 2022 17:45)  BSA (m2): 1.72 (24 Nov 2022 17:45)      CAPILLARY BLOOD GLUCOSE      POCT Blood Glucose.: 233 mg/dL (27 Nov 2022 08:24)  POCT Blood Glucose.: 187 mg/dL (26 Nov 2022 21:25)  POCT Blood Glucose.: 293 mg/dL (26 Nov 2022 16:57)  POCT Blood Glucose.: 277 mg/dL (26 Nov 2022 11:38)          MEDICATIONS  acetylcysteine 10%  Inhalation 4 milliLiter(s) Inhalation every 12 hours  albuterol/ipratropium for Nebulization 3 milliLiter(s) Nebulizer every 6 hours  apixaban 5 milliGRAM(s) Oral every 12 hours  ascorbic acid 500 milliGRAM(s) Oral daily  atorvastatin 20 milliGRAM(s) Oral at bedtime  buDESOnide    Inhalation Suspension 0.5 milliGRAM(s) Inhalation two times a day  calamine/zinc oxide Lotion 1 Application(s) Topical every 8 hours PRN  chlorhexidine 2% Cloths 1 Application(s) Topical daily  collagenase Ointment 1 Application(s) Topical daily  fluconAZOLE   Tablet 400 milliGRAM(s) Oral daily  hydrALAZINE 50 milliGRAM(s) Oral three times a day  insulin lispro (ADMELOG) corrective regimen sliding scale   SubCutaneous three times a day before meals  insulin lispro (ADMELOG) corrective regimen sliding scale   SubCutaneous at bedtime  magnesium oxide 400 milliGRAM(s) Oral every 8 hours  methylPREDNISolone 8 milliGRAM(s) Oral daily  metoprolol tartrate 12.5 milliGRAM(s) Oral two times a day  mexiletine 200 milliGRAM(s) Oral every 8 hours  multivitamin 1 Tablet(s) Oral daily  mupirocin 2% Ointment 1 Application(s) Topical <User Schedule>  nystatin Powder 1 Application(s) Topical two times a day  ondansetron  IVPB 4 milliGRAM(s) IV Intermittent every 8 hours PRN  pantoprazole    Tablet 40 milliGRAM(s) Oral before breakfast  remdesivir  IVPB 100 milliGRAM(s) IV Intermittent every 24 hours  remdesivir  IVPB   IV Intermittent   silver sulfADIAZINE 1% Cream 1 Application(s) Topical two times a day  sodium chloride 0.9% lock flush 3 milliLiter(s) IV Push every 8 hours  sodium chloride 0.9%. 1000 milliLiter(s) IV Continuous <Continuous>  trimethoprim   80 mG/sulfamethoxazole 400 mG 1 Tablet(s) Oral daily      >>> <<<  PHYSICAL EXAM  Subjective: NAD no n/v  Neurology: alert and oriented x 3, nonfocal, no gross deficits  CV : s1s2  Sternal Wound :  CDI , Stable  Lungs: CTA  Abdomen: soft, NT,ND, ( +) stool in colostomy bag  :  voiding  Extremities: -c/c/e      LABS  11-26    141  |  102  |  17  ----------------------------<  267<H>  4.4   |  23  |  0.55    Ca    9.4      26 Nov 2022 08:13    TPro  6.9  /  Alb  3.3  /  TBili  0.2  /  DBili  x   /  AST  20  /  ALT  17  /  AlkPhos  101  11-26                                 11.1   9.05  )-----------( 227      ( 26 Nov 2022 08:13 )             38.0          PT/INR - ( 25 Nov 2022 15:55 )   PT: 17.6 sec;   INR: 1.51 ratio                PAST MEDICAL & SURGICAL HISTORY:  Atrial fibrillation  paroxysmal, on eliquis      Diabetes  Type 2      COPD (chronic obstructive pulmonary disease)      Adrenal insufficiency  Medrol daily for over 50 years      Aortic insufficiency  moderate AR on echo 5/3/2018      Pelvic fracture      Asthma      Tracheobronchomalacia  diagnosed 2015, s/p bronchial thermoplasty 2016 (Dr Zapien); recent bronchoscopy 6/5/2018 revealed no evidence of tracheobronchomalacia in trachea or bronchial tubes      Colorectal cancer  4/2018- last treatment , chemo and radiation      Rectal bleeding      Seizure  x 1 1/7/18      DVT (deep venous thrombosis)  15-20 years ago, took coumadin      TIA (transient ischemic attack)  multiple, last 5 years ago - presents as right-sided weakness      History of partial hysterectomy  30 years ago - fibroids      H/O total knee replacement, bilateral  5 years ago      History of sinus surgery  multiple sinus surgeries      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

## 2022-11-26 NOTE — DIETITIAN INITIAL EVALUATION ADULT - DIET TYPE
Diet textures/consistencies per team. Consider SLP follow up./consistent carbohydrate (evening snack)

## 2022-11-26 NOTE — DIETITIAN INITIAL EVALUATION ADULT - NSFNSGIIOFT_GEN_A_CORE
Pt endorses nausea, last emesis unknown. Pt reports decreased ostomy output.     11-25-22 @ 07:01  -  11-26-22 @ 07:00  --------------------------------------------------------  OUT:    Colostomy (mL): 0 mL  Total OUT: 0 mL    Total NET: 0 mL

## 2022-11-26 NOTE — DIETITIAN INITIAL EVALUATION ADULT - REASON FOR ADMISSION
Pt is a 72 year-old-female with history of DMII, CAD, atrial fibrillation on eliquis, severe persistent asthma on chronic steroids, colon cancer s/p resection/chemo and recent extended hospitalization for type I aortic dissection s/p Bentall procedure and hemiarch replacement 9/6 with Dr. Cabrera. P/w nausea/vomiting and lethargy, COVID+

## 2022-11-26 NOTE — DIETITIAN INITIAL EVALUATION ADULT - NSFNSPHYEXAMSKINFT_GEN_A_CORE
Per wound care: "Sacral/bilateral Buttocks deep tissue injury present on admission  Right elbow non-healing wound" Per RN flow sheets, additionally: left heel DTI, right medial ankle DTI  +midline sternal incision (9/6)

## 2022-11-26 NOTE — DIETITIAN INITIAL EVALUATION ADULT - ORAL INTAKE PTA/DIET HISTORY
Pt known to this RD from previous [facility] admission. Pt received enteral nutrition for majority of length of stay while intubated/trached, decannulated 11/12. S/p MBS 11/16 "Puree/mildly thick liquids, single sips, no straws." Pt states towards end of hospitalization she was tolerating PO with fair to good intake, however states nausea started upon transfer to rehab (11/22) and pt with poor PO intake since. Receiving multivitamin, vitamin C, and magnesium oxide PTA. Pt endorses shellfish allergy.

## 2022-11-26 NOTE — DIETITIAN INITIAL EVALUATION ADULT - ETIOLOGY
inadequate protein energy intake 2/2 nausea/vomiting increased physiological demand for nutrients 2/2 wound healing

## 2022-11-26 NOTE — DIETITIAN INITIAL EVALUATION ADULT - ADD RECOMMEND
Recommend remove DASH/TLC restriction to liberalize diet. Food preferences obtained, RD to honor as feasible. Continue multivitamin and vitamin C to support wound healing. Provide encouragement with PO intake, menu selections, and assistance with meals as needed. Manage symptoms of nausea/vomiting as able. Continue to monitor nutritional intake, labs, weights, BM, skin, clinical course. Malnutrition alert placed in EMR.

## 2022-11-26 NOTE — DIETITIAN INITIAL EVALUATION ADULT - PERTINENT LABORATORY DATA
11-26    141  |  102  |  17  ----------------------------<  267<H>  4.4   |  23  |  0.55    Ca    9.4      26 Nov 2022 08:13  Phos  3.6     11-24  Mg     1.9     11-24    TPro  6.9  /  Alb  3.3  /  TBili  0.2  /  DBili  x   /  AST  20  /  ALT  17  /  AlkPhos  101  11-26  POCT Blood Glucose.: 241 mg/dL (11-26-22 @ 08:00)  A1C with Estimated Average Glucose Result: 9.4 % (09-06-22 @ 16:46)  A1C with Estimated Average Glucose Result: 9.2 % (07-11-22 @ 07:36)  A1C with Estimated Average Glucose Result: 8.0 % (05-10-22 @ 12:26)

## 2022-11-26 NOTE — PROGRESS NOTE ADULT - PROBLEM SELECTOR PLAN 1
Pt covid + on admission from Rehab. ID and Pulmonary consulted for assistance in management with covid, asthma and history of tracheal malacia. Initiate remdesivir 11/25

## 2022-11-26 NOTE — DIETITIAN INITIAL EVALUATION ADULT - NSICDXPASTSURGICALHX_GEN_ALL_CORE_FT
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    H/O pelvic surgery 5 years ago - s/p fracture    H/O total knee replacement, bilateral 5 years ago    History of partial hysterectomy 30 years ago - fibroids    History of sinus surgery multiple sinus surgeries    History of tracheomalacia 2015 - attempted tracheal stenting ([facility])- course complicated by obstruction, respiratory failure, multiple CPR attempts -  stent discontinued; 10/20/2016 Tracheobronchoplasty (Prolene Mesh) performed at [facility] by Dr Zapien    Rectal bleeding exam under anesthesia ([facility]) 2/2018    S/P bronchoscopy 6/5/2018 - [place] (Dr Zapien) no evidence of tracheobronchomalacia in trachea or bronchial tubes

## 2022-11-26 NOTE — DIETITIAN INITIAL EVALUATION ADULT - PERTINENT MEDS FT
MEDICATIONS  (STANDING):  acetylcysteine 10%  Inhalation 4 milliLiter(s) Inhalation every 12 hours  albuterol/ipratropium for Nebulization 3 milliLiter(s) Nebulizer every 6 hours  apixaban 5 milliGRAM(s) Oral every 12 hours  ascorbic acid 500 milliGRAM(s) Oral daily  atorvastatin 20 milliGRAM(s) Oral at bedtime  buDESOnide    Inhalation Suspension 0.5 milliGRAM(s) Inhalation two times a day  chlorhexidine 2% Cloths 1 Application(s) Topical daily  collagenase Ointment 1 Application(s) Topical daily  fluconAZOLE   Tablet 400 milliGRAM(s) Oral daily  hydrALAZINE 50 milliGRAM(s) Oral three times a day  insulin lispro (ADMELOG) corrective regimen sliding scale   SubCutaneous three times a day before meals  insulin lispro (ADMELOG) corrective regimen sliding scale   SubCutaneous at bedtime  magnesium oxide 400 milliGRAM(s) Oral every 8 hours  methylPREDNISolone 8 milliGRAM(s) Oral daily  metoprolol tartrate 12.5 milliGRAM(s) Oral two times a day  mexiletine 200 milliGRAM(s) Oral every 8 hours  multivitamin 1 Tablet(s) Oral daily  mupirocin 2% Ointment 1 Application(s) Topical <User Schedule>  nystatin Powder 1 Application(s) Topical two times a day  pantoprazole    Tablet 40 milliGRAM(s) Oral before breakfast  remdesivir  IVPB   IV Intermittent   remdesivir  IVPB 100 milliGRAM(s) IV Intermittent every 24 hours  silver sulfADIAZINE 1% Cream 1 Application(s) Topical two times a day  sodium chloride 0.9% lock flush 3 milliLiter(s) IV Push every 8 hours  sodium chloride 0.9%. 1000 milliLiter(s) (45 mL/Hr) IV Continuous <Continuous>  trimethoprim   80 mG/sulfamethoxazole 400 mG 1 Tablet(s) Oral daily    MEDICATIONS  (PRN):  calamine/zinc oxide Lotion 1 Application(s) Topical every 8 hours PRN Itching  ondansetron  IVPB 4 milliGRAM(s) IV Intermittent every 8 hours PRN Nausea

## 2022-11-26 NOTE — DIETITIAN INITIAL EVALUATION ADULT - NS FNS DIET ORDER
Diet, Pureed:   Consistent Carbohydrate {No Snacks} (CSTCHO)  DASH/TLC {Sodium & Cholesterol Restricted} (DASH)  Mildly Thick Liquids (MILDTHICKLIQS) (11-25-22 @ 00:07)

## 2022-11-26 NOTE — DIETITIAN INITIAL EVALUATION ADULT - OTHER INFO
-- Pt reports UBW 140lbs, noted dosing wt 137lbs. Pt 151.8lbs on 11/14 per previous RD note however pt in ICU care at the time likely with fluid retention. Reported UBW prior to September admission was 137lbs. Will continue to monitor.  -- HbA1c 9.4% suggesting poor glycemic control PTA. Pt required NPH and Lantus on previous admission, currently on sliding scale insulin. On Prednisone.  -- IVF: NS @ 45mL/hr

## 2022-11-26 NOTE — DIETITIAN INITIAL EVALUATION ADULT - NSICDXPASTMEDICALHX_GEN_ALL_CORE_FT
DISPLAY PLAN FREE TEXT
PAST MEDICAL HISTORY:  Adrenal insufficiency Medrol daily for over 50 years    Aortic insufficiency moderate AR on echo 5/3/2018    Asthma     Atrial fibrillation paroxysmal, on eliquis    Colorectal cancer 4/2018- last treatment , chemo and radiation    COPD (chronic obstructive pulmonary disease)     Diabetes Type 2    DVT (deep venous thrombosis) 15-20 years ago, took coumadin    Pelvic fracture     Rectal bleeding     Seizure x 1 1/7/18    TIA (transient ischemic attack) multiple, last 5 years ago - presents as right-sided weakness    Tracheobronchomalacia diagnosed 2015, s/p bronchial thermoplasty 2016 (Dr Zapien); recent bronchoscopy 6/5/2018 revealed no evidence of tracheobronchomalacia in trachea or bronchial tubes    
normal...

## 2022-11-26 NOTE — PROGRESS NOTE ADULT - ASSESSMENT
72 year-old-female with history of DMII, CAD, atrial fibrillation on eliquis, severe persistent asthma on chronic steroids, colon cancer s/p resection/chemo and recent extended hospitalization for type I aortic dissection s/p Bentall procedure and hemiarch replacement 9/6 with Dr. Cabrera. Patient presents today with 2-day history of vomiting and lethargy. Per daughter at bedside, patient was noted to be more lethargic today, and she lasted saw her on Monday. Patient reports that she has been feeling nauseous since Monday,    11/25 Pt covid + on admission from Rehab. ID and Pulmonary consulted for assistance in management with covid, asthma and history of tracheal malacia. Will initiate remdesivir  11/26 Supplemental O2 weaned. Pt remains afebrile O2 saturation >95% room air. ID consulted

## 2022-11-26 NOTE — DIETITIAN INITIAL EVALUATION ADULT - PERSON TAUGHT/METHOD
Provided recommendations to help prevent nausea/vomiting through foods, recommended small frequent meals, dry foods, not drinking fluid with meals, not laying down during and immediately after eating./verbal instruction/teach back - (Patient repeats in own words)/patient instructed

## 2022-11-26 NOTE — DIETITIAN NUTRITION RISK NOTIFICATION - PHYSICAL ASSESSMENT TEMPLES
[facility]  Palliative Care Follow Up    Claudparamjit Doherty Patient Status:  Inpatient    1958 MRN [id number]   Location [facility] 4W/SW/SE Attending Elli Diane MD   Hosp Day # 8 PCP Chris Gar     Date of Consult: Q3H PRN  •  vancomycin HCl (VANCOCIN) cap 125 mg, 125 mg, Oral, BID  •  diltiazem (CARDIZEM CD) 24 hr cap 120 mg, 120 mg, Oral, Daily  •  Dicyclomine HCl (BENTYL) cap 10 mg, 10 mg, Oral, TID PRN  •  traMADol HCl (ULTRAM) tab 50 mg, 50 mg, Oral, Q4H PRN  • units; -260- take 3 units; -300- take 4 units; -340-take 5 units; -380-take 6 units; -420- take 7 units; BG >420- take 8 units; MDD: 25 units daily  Insulin Pen Needle (BD PEN NEEDLE MEGAN U/F) 32G X 4 MM Does not apply Misc, U Exam:  General: Alert and in no apparent distress. Weak , Chronically- ill-appearing  HEENT: No focal deficits. +dry mucous membranes  Cardiac: Regular rate and rhythm, S1, S2 normal, no murmur, rub or gallop. Lungs: Diminished bilaterally, no wheezing.   [place] C-diff     Afib     DM     GERD  [place]     Malnutrition/Wt loss/poor po intake  -r/t malignancy, per dietician recs     Goals of care counseling  -see above for details  -Confirmed wishes for Full code; recommended pt to consider setting limits  -Ongoing GOC moderate

## 2022-11-27 LAB
ALBUMIN SERPL ELPH-MCNC: 2.9 G/DL — LOW (ref 3.3–5)
ALP SERPL-CCNC: 94 U/L — SIGNIFICANT CHANGE UP (ref 40–120)
ALT FLD-CCNC: 17 U/L — SIGNIFICANT CHANGE UP (ref 10–45)
AST SERPL-CCNC: 24 U/L — SIGNIFICANT CHANGE UP (ref 10–40)
BILIRUB DIRECT SERPL-MCNC: <0.1 MG/DL — SIGNIFICANT CHANGE UP (ref 0–0.3)
BILIRUB INDIRECT FLD-MCNC: >0.1 MG/DL — LOW (ref 0.2–1)
BILIRUB SERPL-MCNC: 0.2 MG/DL — SIGNIFICANT CHANGE UP (ref 0.2–1.2)
CREAT SERPL-MCNC: 0.49 MG/DL — LOW (ref 0.5–1.3)
EGFR: 99 ML/MIN/1.73M2 — SIGNIFICANT CHANGE UP
GLUCOSE BLDC GLUCOMTR-MCNC: 175 MG/DL — HIGH (ref 70–99)
GLUCOSE BLDC GLUCOMTR-MCNC: 233 MG/DL — HIGH (ref 70–99)
GLUCOSE BLDC GLUCOMTR-MCNC: 261 MG/DL — HIGH (ref 70–99)
GLUCOSE BLDC GLUCOMTR-MCNC: 329 MG/DL — HIGH (ref 70–99)
INR BLD: 1.81 RATIO — HIGH (ref 0.88–1.16)
PROT SERPL-MCNC: 6.7 G/DL — SIGNIFICANT CHANGE UP (ref 6–8.3)
PROTHROM AB SERPL-ACNC: 21.1 SEC — HIGH (ref 10.5–13.4)

## 2022-11-27 PROCEDURE — 71045 X-RAY EXAM CHEST 1 VIEW: CPT | Mod: 26

## 2022-11-27 PROCEDURE — 99233 SBSQ HOSP IP/OBS HIGH 50: CPT | Mod: 24

## 2022-11-27 PROCEDURE — 99222 1ST HOSP IP/OBS MODERATE 55: CPT | Mod: 25,GC

## 2022-11-27 RX ADMIN — ONDANSETRON 104 MILLIGRAM(S): 8 TABLET, FILM COATED ORAL at 15:14

## 2022-11-27 RX ADMIN — SODIUM CHLORIDE 45 MILLILITER(S): 9 INJECTION INTRAMUSCULAR; INTRAVENOUS; SUBCUTANEOUS at 06:39

## 2022-11-27 RX ADMIN — Medication 1 TABLET(S): at 13:35

## 2022-11-27 RX ADMIN — SODIUM CHLORIDE 3 MILLILITER(S): 9 INJECTION INTRAMUSCULAR; INTRAVENOUS; SUBCUTANEOUS at 05:03

## 2022-11-27 RX ADMIN — Medication 50 MILLIGRAM(S): at 13:35

## 2022-11-27 RX ADMIN — SODIUM CHLORIDE 3 MILLILITER(S): 9 INJECTION INTRAMUSCULAR; INTRAVENOUS; SUBCUTANEOUS at 13:36

## 2022-11-27 RX ADMIN — ATORVASTATIN CALCIUM 20 MILLIGRAM(S): 80 TABLET, FILM COATED ORAL at 21:14

## 2022-11-27 RX ADMIN — APIXABAN 5 MILLIGRAM(S): 2.5 TABLET, FILM COATED ORAL at 06:14

## 2022-11-27 RX ADMIN — NYSTATIN CREAM 1 APPLICATION(S): 100000 CREAM TOPICAL at 08:49

## 2022-11-27 RX ADMIN — Medication 4 MILLILITER(S): at 06:14

## 2022-11-27 RX ADMIN — SODIUM CHLORIDE 3 MILLILITER(S): 9 INJECTION INTRAMUSCULAR; INTRAVENOUS; SUBCUTANEOUS at 22:05

## 2022-11-27 RX ADMIN — CHLORHEXIDINE GLUCONATE 1 APPLICATION(S): 213 SOLUTION TOPICAL at 13:35

## 2022-11-27 RX ADMIN — PANTOPRAZOLE SODIUM 40 MILLIGRAM(S): 20 TABLET, DELAYED RELEASE ORAL at 06:13

## 2022-11-27 RX ADMIN — MAGNESIUM OXIDE 400 MG ORAL TABLET 400 MILLIGRAM(S): 241.3 TABLET ORAL at 13:35

## 2022-11-27 RX ADMIN — Medication 1 TABLET(S): at 13:36

## 2022-11-27 RX ADMIN — MEXILETINE HYDROCHLORIDE 200 MILLIGRAM(S): 150 CAPSULE ORAL at 06:39

## 2022-11-27 RX ADMIN — Medication 50 MILLIGRAM(S): at 21:15

## 2022-11-27 RX ADMIN — Medication 1 APPLICATION(S): at 08:48

## 2022-11-27 RX ADMIN — Medication 4: at 17:06

## 2022-11-27 RX ADMIN — FLUCONAZOLE 400 MILLIGRAM(S): 150 TABLET ORAL at 13:35

## 2022-11-27 RX ADMIN — Medication 12.5 MILLIGRAM(S): at 06:13

## 2022-11-27 RX ADMIN — MUPIROCIN 1 APPLICATION(S): 20 OINTMENT TOPICAL at 08:49

## 2022-11-27 RX ADMIN — Medication 3 MILLILITER(S): at 00:14

## 2022-11-27 RX ADMIN — Medication 1 APPLICATION(S): at 18:14

## 2022-11-27 RX ADMIN — ONDANSETRON 104 MILLIGRAM(S): 8 TABLET, FILM COATED ORAL at 06:09

## 2022-11-27 RX ADMIN — Medication 12.5 MILLIGRAM(S): at 17:06

## 2022-11-27 RX ADMIN — NYSTATIN CREAM 1 APPLICATION(S): 100000 CREAM TOPICAL at 18:14

## 2022-11-27 RX ADMIN — Medication 4 MILLILITER(S): at 17:07

## 2022-11-27 RX ADMIN — Medication 500 MILLIGRAM(S): at 13:35

## 2022-11-27 RX ADMIN — MAGNESIUM OXIDE 400 MG ORAL TABLET 400 MILLIGRAM(S): 241.3 TABLET ORAL at 06:39

## 2022-11-27 RX ADMIN — MEXILETINE HYDROCHLORIDE 200 MILLIGRAM(S): 150 CAPSULE ORAL at 13:35

## 2022-11-27 RX ADMIN — Medication 1 APPLICATION(S): at 13:35

## 2022-11-27 RX ADMIN — Medication 3: at 13:35

## 2022-11-27 RX ADMIN — Medication 50 MILLIGRAM(S): at 06:13

## 2022-11-27 RX ADMIN — REMDESIVIR 500 MILLIGRAM(S): 5 INJECTION INTRAVENOUS at 16:47

## 2022-11-27 RX ADMIN — Medication 3 MILLILITER(S): at 13:35

## 2022-11-27 RX ADMIN — Medication 3 MILLILITER(S): at 22:05

## 2022-11-27 RX ADMIN — Medication 8 MILLIGRAM(S): at 06:13

## 2022-11-27 RX ADMIN — Medication 2: at 08:48

## 2022-11-27 RX ADMIN — Medication 3 MILLILITER(S): at 06:14

## 2022-11-27 RX ADMIN — MEXILETINE HYDROCHLORIDE 200 MILLIGRAM(S): 150 CAPSULE ORAL at 21:14

## 2022-11-27 RX ADMIN — Medication 3 MILLILITER(S): at 18:14

## 2022-11-27 RX ADMIN — APIXABAN 5 MILLIGRAM(S): 2.5 TABLET, FILM COATED ORAL at 17:06

## 2022-11-27 RX ADMIN — MAGNESIUM OXIDE 400 MG ORAL TABLET 400 MILLIGRAM(S): 241.3 TABLET ORAL at 21:14

## 2022-11-27 RX ADMIN — Medication 0.5 MILLIGRAM(S): at 06:14

## 2022-11-27 RX ADMIN — Medication 0.5 MILLIGRAM(S): at 17:06

## 2022-11-27 NOTE — PROGRESS NOTE ADULT - SUBJECTIVE AND OBJECTIVE BOX
VITAL SIGNS    Telemetry:  SR 80  Vital Signs Last 24 Hrs  T(C): 36.6 (11-27-22 @ 04:51), Max: 36.7 (11-26-22 @ 18:01)  T(F): 97.9 (11-27-22 @ 04:51), Max: 98.1 (11-26-22 @ 18:01)  HR: 82 (11-27-22 @ 04:51) (76 - 89)  BP: 130/63 (11-27-22 @ 04:51) (120/76 - 141/85)  RR: 18 (11-27-22 @ 04:51) (17 - 18)  SpO2: 96% (11-27-22 @ 04:51) (96% - 99%)            11-26 @ 07:01  -  11-27 @ 07:00  --------------------------------------------------------  IN: 2210 mL / OUT: 800 mL / NET: 1410 mL    11-27 @ 07:01  -  11-27 @ 10:48  --------------------------------------------------------  IN: 240 mL / OUT: 0 mL / NET: 240 mL       Daily     Daily   Admit Wt: Drug Dosing Weight  Height (cm): 170.2 (24 Nov 2022 17:45)  Weight (kg): 62.097 (24 Nov 2022 17:45)  BMI (kg/m2): 21.4 (24 Nov 2022 17:45)  BSA (m2): 1.72 (24 Nov 2022 17:45)      CAPILLARY BLOOD GLUCOSE      POCT Blood Glucose.: 233 mg/dL (27 Nov 2022 08:24)  POCT Blood Glucose.: 187 mg/dL (26 Nov 2022 21:25)  POCT Blood Glucose.: 293 mg/dL (26 Nov 2022 16:57)  POCT Blood Glucose.: 277 mg/dL (26 Nov 2022 11:38)          MEDICATIONS  acetylcysteine 10%  Inhalation 4 milliLiter(s) Inhalation every 12 hours  albuterol/ipratropium for Nebulization 3 milliLiter(s) Nebulizer every 6 hours  apixaban 5 milliGRAM(s) Oral every 12 hours  ascorbic acid 500 milliGRAM(s) Oral daily  atorvastatin 20 milliGRAM(s) Oral at bedtime  buDESOnide    Inhalation Suspension 0.5 milliGRAM(s) Inhalation two times a day  calamine/zinc oxide Lotion 1 Application(s) Topical every 8 hours PRN  chlorhexidine 2% Cloths 1 Application(s) Topical daily  collagenase Ointment 1 Application(s) Topical daily  fluconAZOLE   Tablet 400 milliGRAM(s) Oral daily  hydrALAZINE 50 milliGRAM(s) Oral three times a day  insulin lispro (ADMELOG) corrective regimen sliding scale   SubCutaneous three times a day before meals  insulin lispro (ADMELOG) corrective regimen sliding scale   SubCutaneous at bedtime  magnesium oxide 400 milliGRAM(s) Oral every 8 hours  methylPREDNISolone 8 milliGRAM(s) Oral daily  metoprolol tartrate 12.5 milliGRAM(s) Oral two times a day  mexiletine 200 milliGRAM(s) Oral every 8 hours  multivitamin 1 Tablet(s) Oral daily  mupirocin 2% Ointment 1 Application(s) Topical <User Schedule>  nystatin Powder 1 Application(s) Topical two times a day  ondansetron  IVPB 4 milliGRAM(s) IV Intermittent every 8 hours PRN  pantoprazole    Tablet 40 milliGRAM(s) Oral before breakfast  remdesivir  IVPB 100 milliGRAM(s) IV Intermittent every 24 hours  remdesivir  IVPB   IV Intermittent   silver sulfADIAZINE 1% Cream 1 Application(s) Topical two times a day  sodium chloride 0.9% lock flush 3 milliLiter(s) IV Push every 8 hours  sodium chloride 0.9%. 1000 milliLiter(s) IV Continuous <Continuous>  trimethoprim   80 mG/sulfamethoxazole 400 mG 1 Tablet(s) Oral daily      >>> <<<  PHYSICAL EXAM  Subjective: NAD no SOB  Neurology: alert and oriented x 3, nonfocal, no gross deficits  CV : s1s2  Sternal Wound :  CDI , Stable  Lungs: CTA b/l  Abdomen: soft, NT,ND, (+ )BM  :  voiding  Extremities: -c/c/e  right heel dressing c/d/i  left elbow dressing c/d/i    LABS  11-26    141  |  102  |  17  ----------------------------<  267<H>  4.4   |  23  |  0.55    Ca    9.4      26 Nov 2022 08:13    TPro  6.9  /  Alb  3.3  /  TBili  0.2  /  DBili  x   /  AST  20  /  ALT  17  /  AlkPhos  101  11-26                                 11.1   9.05  )-----------( 227      ( 26 Nov 2022 08:13 )             38.0          PT/INR - ( 25 Nov 2022 15:55 )   PT: 17.6 sec;   INR: 1.51 ratio                PAST MEDICAL & SURGICAL HISTORY:  Atrial fibrillation  paroxysmal, on eliquis      Diabetes  Type 2      COPD (chronic obstructive pulmonary disease)      Adrenal insufficiency  Medrol daily for over 50 years      Aortic insufficiency  moderate AR on echo 5/3/2018      Pelvic fracture      Asthma      Tracheobronchomalacia  diagnosed 2015, s/p bronchial thermoplasty 2016 (Dr Zapien); recent bronchoscopy 6/5/2018 revealed no evidence of tracheobronchomalacia in trachea or bronchial tubes      Colorectal cancer  4/2018- last treatment , chemo and radiation      Rectal bleeding      Seizure  x 1 1/7/18      DVT (deep venous thrombosis)  15-20 years ago, took coumadin      TIA (transient ischemic attack)  multiple, last 5 years ago - presents as right-sided weakness      History of partial hysterectomy  30 years ago - fibroids      H/O total knee replacement, bilateral  5 years ago      History of sinus surgery  multiple sinus surgeries      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

## 2022-11-27 NOTE — CONSULT NOTE ADULT - ATTENDING COMMENTS
72 year-old-female with history of DMII, CAD, atrial fibrillation on eliquis, severe persistent asthma on chronic steroids, colon cancer s/p resection/chemo and recent extended hospitalization for type I aortic dissection s/p Bentall procedure and hemiarch replacement 9/6 with Dr. Cabrera. Patient presented with 2-day history of vomiting and lethargy. Found to be covid positive. No shortness of breath.  Not hypoxic.    #Severe persistent asthma  #COVID19 Infection    PLan:  - Dexamethasone 6mg  & Remdesivir    - Continue Singulair 10  - Cont Duoneb q6h with acapella  - Cont Budesonide 0.5mg bid    Dr. Allison will follow-up with patient in the AM.    Radha Desai MD 72 year-old-female with history of DMII, CAD, atrial fibrillation on eliquis, severe persistent asthma on chronic steroids, colon cancer s/p resection/chemo and recent extended hospitalization for type I aortic dissection s/p Bentall procedure and hemiarch replacement 9/6 with Dr. Cabrera. Patient presented with 2-day history of vomiting and lethargy. Found to be covid positive. No shortness of breath.  Not hypoxic.    #Severe persistent asthma  #COVID19 Infection    PLan:  - Dexamethasone 6mg x 10 days  & Remdesivir x 5 days    - Continue Singulair 10  - Cont Duoneb q6h with acapella  - Cont Budesonide 0.5mg bid    Dr. Allison will follow-up with patient in the AM.    Radha Desai MD

## 2022-11-27 NOTE — CONSULT NOTE ADULT - SUBJECTIVE AND OBJECTIVE BOX
CHIEF COMPLAINT:Patient is a 72y old  Female who presents with a chief complaint of Nausea/ Lethargy (27 Nov 2022 10:47)      HPI:  72 year-old-female with history of DMII, CAD, atrial fibrillation on eliquis, severe persistent asthma on chronic steroids, colon cancer s/p resection/chemo and recent extended hospitalization for type I aortic dissection s/p Bentall procedure and hemiarch replacement 9/6 with Dr. Cabrera. Patient presents today with 2-day history of vomiting and lethargy. Per daughter at bedside, patient was noted to be more lethargic today, and she lasted saw her on Monday. Patient reports that she has been feeling nauseous since Monday, and has been throwing up since then. Patient however denies chest pain, shortness of breath, abdominal pain, difficulty urinating. (24 Nov 2022 21:51)      PAST MEDICAL & SURGICAL HISTORY:  Atrial fibrillation  paroxysmal, on eliquis      Diabetes  Type 2      COPD (chronic obstructive pulmonary disease)      Adrenal insufficiency  Medrol daily for over 50 years      Aortic insufficiency  moderate AR on echo 5/3/2018      Pelvic fracture      Asthma      Tracheobronchomalacia  diagnosed 2015, s/p bronchial thermoplasty 2016 (Dr Zapien); recent bronchoscopy 6/5/2018 revealed no evidence of tracheobronchomalacia in trachea or bronchial tubes      Colorectal cancer  4/2018- last treatment , chemo and radiation      Rectal bleeding      Seizure  x 1 1/7/18      DVT (deep venous thrombosis)  15-20 years ago, took coumadin      TIA (transient ischemic attack)  multiple, last 5 years ago - presents as right-sided weakness      History of partial hysterectomy  30 years ago - fibroids      H/O total knee replacement, bilateral  5 years ago      History of sinus surgery  multiple sinus surgeries      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      H/O pelvic surgery  5 years ago - s/p fracture      History of tracheomalacia  2015 - attempted tracheal stenting ([facility])- course complicated by obstruction, respiratory failure, multiple CPR attempts -  stent discontinued; 10/20/2016 Tracheobronchoplasty (Prolene Mesh) performed at [facility] by Dr Zapien      S/P bronchoscopy  6/5/2018 - [place] (Dr Zapien) no evidence of tracheobronchomalacia in trachea or bronchial tubes      Rectal bleeding  exam under anesthesia ([facility]) 2/2018          FAMILY HISTORY:  Family history of asthma    Family history of breast cancer (Sibling)    Family history of diabetes mellitus type II        SOCIAL HISTORY:  Smoking: [ ] Never Smoked [ ] Former Smoker (__ packs x ___ years) [ ] Current Smoker  (__ packs x ___ years)  Substance Use: [ ] Never Used [ ] Used ____  EtOH Use:  Marital Status: [ ] Single [ ]  [ ]  [ ]   Sexual History:   Occupation:  Recent Travel:  Country of Birth:  Advance Directives:    Allergies    aspirin (Short breath)  Avelox (Short breath; Pruritus)  cefepime (Anaphylaxis)  codeine (Short breath)  Dilaudid (Short breath)  iodine (Short breath; Swelling)  penicillin (Anaphylaxis)  shellfish (Anaphylaxis)  tetanus toxoid (Short breath)  Valium (Short breath)    Intolerances        HOME MEDICATIONS:  Home Medications:  acetaminophen 160 mg/5 mL oral suspension: 20.31 milliliter(s) orally every 6 hours, As needed, Temp greater or equal to 38C (100.4F), Mild Pain (1 - 3) (22 Nov 2022 11:40)  acetylcysteine 10% inhalation solution: 4 milliliter(s) inhaled every 12 hours (22 Nov 2022 11:40)  apixaban 5 mg oral tablet: 1 tab(s) orally every 12 hours (22 Nov 2022 11:40)  ascorbic acid 500 mg oral tablet: 1 tab(s) orally once a day (22 Nov 2022 11:40)  budesonide: 0.5 milligram(s) inhaled 2 times a day (22 Nov 2022 11:40)  calamine topical lotion: 1 application topically every 8 hours, As needed, Itching (22 Nov 2022 11:40)  collagenase 250 units/g topical ointment: 1 application topically once a day (22 Nov 2022 11:40)  Crestor 5 mg oral tablet: 1 tab(s) orally once a day (at bedtime) (10 May 2022 18:50)  diphenhydramine-zinc acetate 2%-0.1% topical cream: 1 application topically every 8 hours (22 Nov 2022 11:40)  fluconazole 200 mg oral tablet: 2 tab(s) orally once a day (22 Nov 2022 11:40)  hydrALAZINE 50 mg oral tablet: 1 tab(s) orally 3 times a day (22 Nov 2022 11:40)  insulin glargine 100 units/mL subcutaneous solution: 8 unit(s) subcutaneous once a day (at bedtime) (22 Nov 2022 11:40)  insulin lispro 100 units/mL injectable solution: 7 unit(s) injectable 3 times a day (before meals) (22 Nov 2022 11:40)  ipratropium-albuterol 0.5 mg-2.5 mg/3 mL inhalation solution: 3 milliliter(s) inhaled every 6 hours (22 Nov 2022 11:40)  magnesium oxide 400 mg oral tablet: 1 tab(s) orally every 8 hours (22 Nov 2022 11:40)  methylPREDNISolone 8 mg oral tablet: 1 tab(s) orally once a day (22 Nov 2022 11:40)  metoprolol: 12.5 milligram(s) orally 2 times a day (22 Nov 2022 11:40)  mexiletine 200 mg oral capsule: 1 cap(s) orally every 8 hours (22 Nov 2022 11:40)  Multiple Vitamins oral tablet: 1 tab(s) orally once a day (22 Nov 2022 11:40)  nystatin 100,000 units/g topical powder: 1 application topically 2 times a day (22 Nov 2022 11:40)  pantoprazole 40 mg oral delayed release tablet: 1 tab(s) orally once a day (before a meal) (10 May 2022 18:50)  sulfamethoxazole-trimethoprim 400 mg-80 mg oral tablet: 1 tab(s) orally once a day (22 Nov 2022 11:40)      REVIEW OF SYSTEMS:  Constitutional: [ ] negative [ ] fevers [ ] chills [ ] weight loss [ ] weight gain  HEENT: [ ] negative [ ] dry eyes [ ] eye irritation [ ] postnasal drip [ ] nasal congestion  CV: [ ] negative  [ ] chest pain [ ] orthopnea [ ] palpitations [ ] murmur  Resp: [ ] negative [ ] cough [ ] shortness of breath [ ] dyspnea [ ] wheezing [ ] sputum [ ] hemoptysis  GI: [ ] negative [ ] nausea [ ] vomiting [ ] diarrhea [ ] constipation [ ] abd pain [ ] dysphagia   : [ ] negative [ ] dysuria [ ] nocturia [ ] hematuria [ ] increased urinary frequency  Musculoskeletal: [ ] negative [ ] back pain [ ] myalgias [ ] arthralgias [ ] fracture  Skin: [ ] negative [ ] rash [ ] itch  Neurological: [ ] negative [ ] headache [ ] dizziness [ ] syncope [ ] weakness [ ] numbness  Psychiatric: [ ] negative [ ] anxiety [ ] depression  Endocrine: [ ] negative [ ] diabetes [ ] thyroid problem  Hematologic/Lymphatic: [ ] negative [ ] anemia [ ] bleeding problem  Allergic/Immunologic: [ ] negative [ ] itchy eyes [ ] nasal discharge [ ] hives [ ] angioedema  [x] All other systems negative  [ ] Unable to assess ROS because ________    OBJECTIVE:  ICU Vital Signs Last 24 Hrs  T(C): 36.6 (27 Nov 2022 04:51), Max: 36.7 (26 Nov 2022 18:01)  T(F): 97.9 (27 Nov 2022 04:51), Max: 98.1 (26 Nov 2022 18:01)  HR: 82 (27 Nov 2022 04:51) (76 - 89)  BP: 130/63 (27 Nov 2022 04:51) (120/76 - 141/85)  BP(mean): 85 (27 Nov 2022 04:51) (85 - 104)  ABP: --  ABP(mean): --  RR: 18 (27 Nov 2022 04:51) (17 - 18)  SpO2: 96% (27 Nov 2022 04:51) (96% - 99%)    O2 Parameters below as of 27 Nov 2022 04:51  Patient On (Oxygen Delivery Method): room air              11-26 @ 07:01 - 11-27 @ 07:00  --------------------------------------------------------  IN: 2210 mL / OUT: 800 mL / NET: 1410 mL    11-27 @ 07:01  -  11-27 @ 11:16  --------------------------------------------------------  IN: 240 mL / OUT: 0 mL / NET: 240 mL      CAPILLARY BLOOD GLUCOSE      POCT Blood Glucose.: 233 mg/dL (27 Nov 2022 08:24)      PHYSICAL EXAM:  GENERAL: NAD, well-groomed, well-developed  HEAD:  Atraumatic, Normocephalic  EYES: EOMI, conjunctiva and sclera clear  ENMT: Moist mucous membranes  CHEST/LUNG: Clear to auscultation bilaterally; No rales, rhonchi, wheezing, or rubs  HEART: Regular rate and rhythm; No murmurs, rubs, or gallops  ABDOMEN: Nondistended  VASCULAR: No  cyanosis, or edema  SKIN: No rashes or lesions  NERVOUS SYSTEM:  Alert & Oriented X3, Good concentration    HOSPITAL MEDICATIONS:  Standing Meds:  acetylcysteine 10%  Inhalation 4 milliLiter(s) Inhalation every 12 hours  albuterol/ipratropium for Nebulization 3 milliLiter(s) Nebulizer every 6 hours  apixaban 5 milliGRAM(s) Oral every 12 hours  ascorbic acid 500 milliGRAM(s) Oral daily  atorvastatin 20 milliGRAM(s) Oral at bedtime  buDESOnide    Inhalation Suspension 0.5 milliGRAM(s) Inhalation two times a day  chlorhexidine 2% Cloths 1 Application(s) Topical daily  collagenase Ointment 1 Application(s) Topical daily  fluconAZOLE   Tablet 400 milliGRAM(s) Oral daily  hydrALAZINE 50 milliGRAM(s) Oral three times a day  insulin lispro (ADMELOG) corrective regimen sliding scale   SubCutaneous three times a day before meals  insulin lispro (ADMELOG) corrective regimen sliding scale   SubCutaneous at bedtime  magnesium oxide 400 milliGRAM(s) Oral every 8 hours  methylPREDNISolone 8 milliGRAM(s) Oral daily  metoprolol tartrate 12.5 milliGRAM(s) Oral two times a day  mexiletine 200 milliGRAM(s) Oral every 8 hours  multivitamin 1 Tablet(s) Oral daily  mupirocin 2% Ointment 1 Application(s) Topical <User Schedule>  nystatin Powder 1 Application(s) Topical two times a day  pantoprazole    Tablet 40 milliGRAM(s) Oral before breakfast  remdesivir  IVPB 100 milliGRAM(s) IV Intermittent every 24 hours  remdesivir  IVPB   IV Intermittent   silver sulfADIAZINE 1% Cream 1 Application(s) Topical two times a day  sodium chloride 0.9% lock flush 3 milliLiter(s) IV Push every 8 hours  sodium chloride 0.9%. 1000 milliLiter(s) IV Continuous <Continuous>  trimethoprim   80 mG/sulfamethoxazole 400 mG 1 Tablet(s) Oral daily      PRN Meds:  calamine/zinc oxide Lotion 1 Application(s) Topical every 8 hours PRN  ondansetron  IVPB 4 milliGRAM(s) IV Intermittent every 8 hours PRN      LABS:    11-26    141  |  102  |  17  ----------------------------<  267<H>  4.4   |  23  |  0.55  11-25    137  |  98  |  18  ----------------------------<  284<H>  5.1   |  20<L>  |  0.64  11-25    138  |  100  |  17  ----------------------------<  316<H>  4.8   |  22  |  0.69    Ca    9.4      26 Nov 2022 08:13  Ca    9.9      25 Nov 2022 17:35  Ca    10.3      25 Nov 2022 10:05    TPro  6.9  /  Alb  3.3  /  TBili  0.2  /  DBili  x   /  AST  20  /  ALT  17  /  AlkPhos  101  11-26  TPro  7.5  /  Alb  3.4  /  TBili  0.2  /  DBili  <0.1  /  AST  21  /  ALT  21  /  AlkPhos  119  11-25  TPro  7.9  /  Alb  3.5  /  TBili  0.3  /  DBili  x   /  AST  24  /  ALT  23  /  AlkPhos  117  11-25    Magnesium, Serum: 1.9 mg/dL (11-24-22 @ 20:00)    Phosphorus Level, Serum: 3.6 mg/dL (11-24-22 @ 20:00)      PT/INR - ( 27 Nov 2022 10:48 )   PT: 21.1 sec;   INR: 1.81 ratio                                                 11.1   9.05  )-----------( 227      ( 26 Nov 2022 08:13 )             38.0                         12.5   10.11 )-----------( 255      ( 25 Nov 2022 17:35 )             42.5                         12.8   14.54 )-----------( 234      ( 25 Nov 2022 10:05 )             43.3     CAPILLARY BLOOD GLUCOSE      POCT Blood Glucose.: 233 mg/dL (27 Nov 2022 08:24)  POCT Blood Glucose.: 187 mg/dL (26 Nov 2022 21:25)  POCT Blood Glucose.: 293 mg/dL (26 Nov 2022 16:57)  POCT Blood Glucose.: 277 mg/dL (26 Nov 2022 11:38)    Blood Gas Source Venous: Venous (11-24-22 @ 19:50)      MICROBIOLOGY:     Culture - Blood (collected 24 Nov 2022 19:45)  Source: .Blood Blood-Peripheral  Preliminary Report (25 Nov 2022 23:01):    No growth to date.    Culture - Blood (collected 24 Nov 2022 19:30)  Source: .Blood Blood-Peripheral  Preliminary Report (25 Nov 2022 23:01):    No growth to date.        RADIOLOGY:  [ ] Reviewed and interpreted by me   Pulmonology consult note    CHIEF COMPLAINT:Patient is a 72y old  Female who presents with a chief complaint of Nausea/ Lethargy (27 Nov 2022 10:47)      HPI:  72 year-old-female with history of DMII, CAD, atrial fibrillation on eliquis, severe persistent asthma on chronic steroids, colon cancer s/p resection/chemo and recent extended hospitalization for type I aortic dissection s/p Bentall procedure and hemiarch replacement 9/6 with Dr. Cabrera. Patient presented with 2-day history of vomiting and lethargy. Patient reports that she has been feeling nauseous since Monday, and has been throwing up since then.     Tested positive for covid. Remains on RA.       PAST MEDICAL & SURGICAL HISTORY:  Atrial fibrillation  paroxysmal, on eliquis      Diabetes  Type 2      COPD (chronic obstructive pulmonary disease)      Adrenal insufficiency  Medrol daily for over 50 years      Aortic insufficiency  moderate AR on echo 5/3/2018      Pelvic fracture      Asthma      Tracheobronchomalacia  diagnosed 2015, s/p bronchial thermoplasty 2016 (Dr Zapien); recent bronchoscopy 6/5/2018 revealed no evidence of tracheobronchomalacia in trachea or bronchial tubes      Colorectal cancer  4/2018- last treatment , chemo and radiation      Rectal bleeding      Seizure  x 1 1/7/18      DVT (deep venous thrombosis)  15-20 years ago, took coumadin      TIA (transient ischemic attack)  multiple, last 5 years ago - presents as right-sided weakness      History of partial hysterectomy  30 years ago - fibroids      H/O total knee replacement, bilateral  5 years ago      History of sinus surgery  multiple sinus surgeries      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      H/O pelvic surgery  5 years ago - s/p fracture      History of tracheomalacia  2015 - attempted tracheal stenting ([facility])- course complicated by obstruction, respiratory failure, multiple CPR attempts -  stent discontinued; 10/20/2016 Tracheobronchoplasty (Prolene Mesh) performed at [facility] by Dr Zapien      S/P bronchoscopy  6/5/2018 - [place] (Dr Zapien) no evidence of tracheobronchomalacia in trachea or bronchial tubes      Rectal bleeding  exam under anesthesia ([facility]) 2/2018          FAMILY HISTORY:  Family history of asthma    Family history of breast cancer (Sibling)    Family history of diabetes mellitus type II        SOCIAL HISTORY:  Smoking: [ ] Never Smoked [ ] Former Smoker (__ packs x ___ years) [ ] Current Smoker  (__ packs x ___ years)  Substance Use: [ ] Never Used [ ] Used ____  EtOH Use:  Marital Status: [ ] Single [ ]  [ ]  [ ]   Sexual History:   Occupation:  Recent Travel:  Country of Birth:  Advance Directives:    Allergies    aspirin (Short breath)  Avelox (Short breath; Pruritus)  cefepime (Anaphylaxis)  codeine (Short breath)  Dilaudid (Short breath)  iodine (Short breath; Swelling)  penicillin (Anaphylaxis)  shellfish (Anaphylaxis)  tetanus toxoid (Short breath)  Valium (Short breath)    Intolerances        HOME MEDICATIONS:  Home Medications:  acetaminophen 160 mg/5 mL oral suspension: 20.31 milliliter(s) orally every 6 hours, As needed, Temp greater or equal to 38C (100.4F), Mild Pain (1 - 3) (22 Nov 2022 11:40)  acetylcysteine 10% inhalation solution: 4 milliliter(s) inhaled every 12 hours (22 Nov 2022 11:40)  apixaban 5 mg oral tablet: 1 tab(s) orally every 12 hours (22 Nov 2022 11:40)  ascorbic acid 500 mg oral tablet: 1 tab(s) orally once a day (22 Nov 2022 11:40)  budesonide: 0.5 milligram(s) inhaled 2 times a day (22 Nov 2022 11:40)  calamine topical lotion: 1 application topically every 8 hours, As needed, Itching (22 Nov 2022 11:40)  collagenase 250 units/g topical ointment: 1 application topically once a day (22 Nov 2022 11:40)  Crestor 5 mg oral tablet: 1 tab(s) orally once a day (at bedtime) (10 May 2022 18:50)  diphenhydramine-zinc acetate 2%-0.1% topical cream: 1 application topically every 8 hours (22 Nov 2022 11:40)  fluconazole 200 mg oral tablet: 2 tab(s) orally once a day (22 Nov 2022 11:40)  hydrALAZINE 50 mg oral tablet: 1 tab(s) orally 3 times a day (22 Nov 2022 11:40)  insulin glargine 100 units/mL subcutaneous solution: 8 unit(s) subcutaneous once a day (at bedtime) (22 Nov 2022 11:40)  insulin lispro 100 units/mL injectable solution: 7 unit(s) injectable 3 times a day (before meals) (22 Nov 2022 11:40)  ipratropium-albuterol 0.5 mg-2.5 mg/3 mL inhalation solution: 3 milliliter(s) inhaled every 6 hours (22 Nov 2022 11:40)  magnesium oxide 400 mg oral tablet: 1 tab(s) orally every 8 hours (22 Nov 2022 11:40)  methylPREDNISolone 8 mg oral tablet: 1 tab(s) orally once a day (22 Nov 2022 11:40)  metoprolol: 12.5 milligram(s) orally 2 times a day (22 Nov 2022 11:40)  mexiletine 200 mg oral capsule: 1 cap(s) orally every 8 hours (22 Nov 2022 11:40)  Multiple Vitamins oral tablet: 1 tab(s) orally once a day (22 Nov 2022 11:40)  nystatin 100,000 units/g topical powder: 1 application topically 2 times a day (22 Nov 2022 11:40)  pantoprazole 40 mg oral delayed release tablet: 1 tab(s) orally once a day (before a meal) (10 May 2022 18:50)  sulfamethoxazole-trimethoprim 400 mg-80 mg oral tablet: 1 tab(s) orally once a day (22 Nov 2022 11:40)      REVIEW OF SYSTEMS:  Constitutional: [ ] negative [ ] fevers [ ] chills [ ] weight loss [ ] weight gain  HEENT: [ ] negative [ ] dry eyes [ ] eye irritation [ ] postnasal drip [ ] nasal congestion  CV: [ ] negative  [ ] chest pain [ ] orthopnea [ ] palpitations [ ] murmur  Resp: [ ] negative [ ] cough [ ] shortness of breath [ ] dyspnea [ ] wheezing [ ] sputum [ ] hemoptysis  GI: [ ] negative [ ] nausea [ ] vomiting [ ] diarrhea [ ] constipation [ ] abd pain [ ] dysphagia   : [ ] negative [ ] dysuria [ ] nocturia [ ] hematuria [ ] increased urinary frequency  Musculoskeletal: [ ] negative [ ] back pain [ ] myalgias [ ] arthralgias [ ] fracture  Skin: [ ] negative [ ] rash [ ] itch  Neurological: [ ] negative [ ] headache [ ] dizziness [ ] syncope [ ] weakness [ ] numbness  Psychiatric: [ ] negative [ ] anxiety [ ] depression  Endocrine: [ ] negative [ ] diabetes [ ] thyroid problem  Hematologic/Lymphatic: [ ] negative [ ] anemia [ ] bleeding problem  Allergic/Immunologic: [ ] negative [ ] itchy eyes [ ] nasal discharge [ ] hives [ ] angioedema  [x] All other systems negative  [ ] Unable to assess ROS because ________    OBJECTIVE:  ICU Vital Signs Last 24 Hrs  T(C): 36.6 (27 Nov 2022 04:51), Max: 36.7 (26 Nov 2022 18:01)  T(F): 97.9 (27 Nov 2022 04:51), Max: 98.1 (26 Nov 2022 18:01)  HR: 82 (27 Nov 2022 04:51) (76 - 89)  BP: 130/63 (27 Nov 2022 04:51) (120/76 - 141/85)  BP(mean): 85 (27 Nov 2022 04:51) (85 - 104)  ABP: --  ABP(mean): --  RR: 18 (27 Nov 2022 04:51) (17 - 18)  SpO2: 96% (27 Nov 2022 04:51) (96% - 99%)    O2 Parameters below as of 27 Nov 2022 04:51  Patient On (Oxygen Delivery Method): room air              11-26 @ 07:01 - 11-27 @ 07:00  --------------------------------------------------------  IN: 2210 mL / OUT: 800 mL / NET: 1410 mL    11-27 @ 07:01  -  11-27 @ 11:16  --------------------------------------------------------  IN: 240 mL / OUT: 0 mL / NET: 240 mL      CAPILLARY BLOOD GLUCOSE      POCT Blood Glucose.: 233 mg/dL (27 Nov 2022 08:24)      PHYSICAL EXAM:  GENERAL: NAD, well-groomed, well-developed  HEAD:  Atraumatic, Normocephalic  EYES: EOMI, conjunctiva and sclera clear  ENMT: Moist mucous membranes  CHEST/LUNG: Clear to auscultation bilaterally; No rales, rhonchi, wheezing, or rubs  HEART: Regular rate and rhythm; No murmurs, rubs, or gallops  ABDOMEN: Nondistended  VASCULAR: No  cyanosis, or edema  SKIN: No rashes or lesions  NERVOUS SYSTEM:  Alert & Oriented X3, Good concentration    HOSPITAL MEDICATIONS:  Standing Meds:  acetylcysteine 10%  Inhalation 4 milliLiter(s) Inhalation every 12 hours  albuterol/ipratropium for Nebulization 3 milliLiter(s) Nebulizer every 6 hours  apixaban 5 milliGRAM(s) Oral every 12 hours  ascorbic acid 500 milliGRAM(s) Oral daily  atorvastatin 20 milliGRAM(s) Oral at bedtime  buDESOnide    Inhalation Suspension 0.5 milliGRAM(s) Inhalation two times a day  chlorhexidine 2% Cloths 1 Application(s) Topical daily  collagenase Ointment 1 Application(s) Topical daily  fluconAZOLE   Tablet 400 milliGRAM(s) Oral daily  hydrALAZINE 50 milliGRAM(s) Oral three times a day  insulin lispro (ADMELOG) corrective regimen sliding scale   SubCutaneous three times a day before meals  insulin lispro (ADMELOG) corrective regimen sliding scale   SubCutaneous at bedtime  magnesium oxide 400 milliGRAM(s) Oral every 8 hours  methylPREDNISolone 8 milliGRAM(s) Oral daily  metoprolol tartrate 12.5 milliGRAM(s) Oral two times a day  mexiletine 200 milliGRAM(s) Oral every 8 hours  multivitamin 1 Tablet(s) Oral daily  mupirocin 2% Ointment 1 Application(s) Topical <User Schedule>  nystatin Powder 1 Application(s) Topical two times a day  pantoprazole    Tablet 40 milliGRAM(s) Oral before breakfast  remdesivir  IVPB 100 milliGRAM(s) IV Intermittent every 24 hours  remdesivir  IVPB   IV Intermittent   silver sulfADIAZINE 1% Cream 1 Application(s) Topical two times a day  sodium chloride 0.9% lock flush 3 milliLiter(s) IV Push every 8 hours  sodium chloride 0.9%. 1000 milliLiter(s) IV Continuous <Continuous>  trimethoprim   80 mG/sulfamethoxazole 400 mG 1 Tablet(s) Oral daily      PRN Meds:  calamine/zinc oxide Lotion 1 Application(s) Topical every 8 hours PRN  ondansetron  IVPB 4 milliGRAM(s) IV Intermittent every 8 hours PRN      LABS:    11-26    141  |  102  |  17  ----------------------------<  267<H>  4.4   |  23  |  0.55  11-25    137  |  98  |  18  ----------------------------<  284<H>  5.1   |  20<L>  |  0.64  11-25    138  |  100  |  17  ----------------------------<  316<H>  4.8   |  22  |  0.69    Ca    9.4      26 Nov 2022 08:13  Ca    9.9      25 Nov 2022 17:35  Ca    10.3      25 Nov 2022 10:05    TPro  6.9  /  Alb  3.3  /  TBili  0.2  /  DBili  x   /  AST  20  /  ALT  17  /  AlkPhos  101  11-26  TPro  7.5  /  Alb  3.4  /  TBili  0.2  /  DBili  <0.1  /  AST  21  /  ALT  21  /  AlkPhos  119  11-25  TPro  7.9  /  Alb  3.5  /  TBili  0.3  /  DBili  x   /  AST  24  /  ALT  23  /  AlkPhos  117  11-25    Magnesium, Serum: 1.9 mg/dL (11-24-22 @ 20:00)    Phosphorus Level, Serum: 3.6 mg/dL (11-24-22 @ 20:00)      PT/INR - ( 27 Nov 2022 10:48 )   PT: 21.1 sec;   INR: 1.81 ratio                                                 11.1   9.05  )-----------( 227      ( 26 Nov 2022 08:13 )             38.0                         12.5   10.11 )-----------( 255      ( 25 Nov 2022 17:35 )             42.5                         12.8   14.54 )-----------( 234      ( 25 Nov 2022 10:05 )             43.3     CAPILLARY BLOOD GLUCOSE      POCT Blood Glucose.: 233 mg/dL (27 Nov 2022 08:24)  POCT Blood Glucose.: 187 mg/dL (26 Nov 2022 21:25)  POCT Blood Glucose.: 293 mg/dL (26 Nov 2022 16:57)  POCT Blood Glucose.: 277 mg/dL (26 Nov 2022 11:38)    Blood Gas Source Venous: Venous (11-24-22 @ 19:50)      MICROBIOLOGY:     Culture - Blood (collected 24 Nov 2022 19:45)  Source: .Blood Blood-Peripheral  Preliminary Report (25 Nov 2022 23:01):    No growth to date.    Culture - Blood (collected 24 Nov 2022 19:30)  Source: .Blood Blood-Peripheral  Preliminary Report (25 Nov 2022 23:01):    No growth to date.        RADIOLOGY:  [ ] Reviewed and interpreted by me

## 2022-11-27 NOTE — CONSULT NOTE ADULT - ASSESSMENT
== Incomplete note, please do not follow reccs until complete==      -ID f/up-resp stable--wound care f/up-TC continues- (she will always have secretions) due to TBM-s/p  trach 10/10-s/p  resp failure-s/p [id number]-BOWQ-dl ABX-stable CV status-re- VEST rx in use  multifactorial dyspnea-resp failure-severe persistent asthma, TBM s/p tracheoplasty, s/p Aortic aneurysm repair, Bronchitis (proteus)-O2-keep 90% (RA/NC)  severe persistent asthma--medrol 8mg, singulair 10, duoneb q 6, budes .5 bid, tezspire 8/29-was due 9/29-next upon DC  TBM-s/p tracheoplasty--accapella, vest rx, mucomyst here 2 cc 10% q 8 (secretions)  s/p Aortic aneurysm repair--as per CTS staff care  AF-on eliquis rx               CV-improved hydralazine/lopressor/mexilitine   DVT R-IJ-on oral A/C  *****ID-s/p proteus bronchitis-s/p meropenum changed to ceftriaxone and back to meropenem/vanco- off of ABX as of 9/19--restart of ABX 9/27-meropenem/vanco-s/p  meropenem s/p vanco for MRSE sepsis x 6 wks, plastics/ortho for elbow-proteus (?wash out)-vac in place-suppressive rx-bact/fluconazole as of 11/15  PT-OOB as able                             GI-TF as able--f/up LFTs-normal-NGT out-eating      ****ORTHO f/up--? additional wash out of elbow wound?; wound care f/up  **VC dysfunction--aspiration precautions-s/p trach 10/10-ENT f/up s/p laryngoscopy- decannulated  swallow issues-may need new dentures  **right shoulder issues-? ortho re-consult--improved significantly  Heme onc f/up colon ca  prog-improving                PT-to continue-more OOB      72 year-old-female with history of DMII, CAD, atrial fibrillation on eliquis, severe persistent asthma on chronic steroids, colon cancer s/p resection/chemo and recent extended hospitalization for type I aortic dissection s/p Bentall procedure and hemiarch replacement 9/6 with Dr. Cabrera. Patient presented with 2-day history of vomiting and lethargy. Found to be covid positive.         #severe persistent asthma  #covid   - dexamethasone 6mg    - Remdesevir    - singulair 10  - duoneb q 6  - budes .5 bid  - o2 goal <90%    # TBM  -s/p tracheoplasty  -accapella  - mucomyst here 2 cc 10% q 8 (secretions)    #s/p Aortic aneurysm repair  --as per [facility]TS staff care  AF-on eliquis rx           CV-improved hydralazine/lopressor/mexilitine

## 2022-11-27 NOTE — PROGRESS NOTE ADULT - ASSESSMENT
72 year-old-female with history of DMII, CAD, atrial fibrillation on eliquis, severe persistent asthma on chronic steroids, colon cancer s/p resection/chemo and recent extended hospitalization for type I aortic dissection s/p Bentall procedure and hemiarch replacement 9/6 with Dr. Cabrera. Patient presents today with 2-day history of vomiting and lethargy. Per daughter at bedside, patient was noted to be more lethargic today, and she lasted saw her on Monday. Patient reports that she has been feeling nauseous since Monday,    11/25 Pt covid + on admission from Rehab. ID and Pulmonary consulted for assistance in management with covid, asthma and history of tracheal malacia. Will initiate remdesivir  11/26 Supplemental O2 weaned. Pt remains afebrile O2 saturation >95% room air. ID consulted  11/27 #3/5 day remdesivir. No evidence of respiratory distress. No episodes of nausea/vomitting overnight.

## 2022-11-28 LAB
ALBUMIN SERPL ELPH-MCNC: 3 G/DL — LOW (ref 3.3–5)
ALP SERPL-CCNC: 92 U/L — SIGNIFICANT CHANGE UP (ref 40–120)
ALT FLD-CCNC: 15 U/L — SIGNIFICANT CHANGE UP (ref 10–45)
ANION GAP SERPL CALC-SCNC: 12 MMOL/L — SIGNIFICANT CHANGE UP (ref 5–17)
AST SERPL-CCNC: 24 U/L — SIGNIFICANT CHANGE UP (ref 10–40)
BASOPHILS # BLD AUTO: 0.01 K/UL — SIGNIFICANT CHANGE UP (ref 0–0.2)
BASOPHILS NFR BLD AUTO: 0.2 % — SIGNIFICANT CHANGE UP (ref 0–2)
BILIRUB DIRECT SERPL-MCNC: <0.1 MG/DL — SIGNIFICANT CHANGE UP (ref 0–0.3)
BILIRUB INDIRECT FLD-MCNC: >0.1 MG/DL — LOW (ref 0.2–1)
BILIRUB SERPL-MCNC: 0.2 MG/DL — SIGNIFICANT CHANGE UP (ref 0.2–1.2)
BUN SERPL-MCNC: 10 MG/DL — SIGNIFICANT CHANGE UP (ref 7–23)
CALCIUM SERPL-MCNC: 8.7 MG/DL — SIGNIFICANT CHANGE UP (ref 8.4–10.5)
CHLORIDE SERPL-SCNC: 101 MMOL/L — SIGNIFICANT CHANGE UP (ref 96–108)
CO2 SERPL-SCNC: 25 MMOL/L — SIGNIFICANT CHANGE UP (ref 22–31)
CREAT SERPL-MCNC: 0.53 MG/DL — SIGNIFICANT CHANGE UP (ref 0.5–1.3)
EGFR: 97 ML/MIN/1.73M2 — SIGNIFICANT CHANGE UP
EOSINOPHIL # BLD AUTO: 0.01 K/UL — SIGNIFICANT CHANGE UP (ref 0–0.5)
EOSINOPHIL NFR BLD AUTO: 0.2 % — SIGNIFICANT CHANGE UP (ref 0–6)
GLUCOSE BLDC GLUCOMTR-MCNC: 151 MG/DL — HIGH (ref 70–99)
GLUCOSE BLDC GLUCOMTR-MCNC: 242 MG/DL — HIGH (ref 70–99)
GLUCOSE BLDC GLUCOMTR-MCNC: 275 MG/DL — HIGH (ref 70–99)
GLUCOSE BLDC GLUCOMTR-MCNC: 294 MG/DL — HIGH (ref 70–99)
GLUCOSE SERPL-MCNC: 200 MG/DL — HIGH (ref 70–99)
HCT VFR BLD CALC: 35.7 % — SIGNIFICANT CHANGE UP (ref 34.5–45)
HGB BLD-MCNC: 10.6 G/DL — LOW (ref 11.5–15.5)
IMM GRANULOCYTES NFR BLD AUTO: 0.4 % — SIGNIFICANT CHANGE UP (ref 0–0.9)
INR BLD: 1.7 RATIO — HIGH (ref 0.88–1.16)
LYMPHOCYTES # BLD AUTO: 0.73 K/UL — LOW (ref 1–3.3)
LYMPHOCYTES # BLD AUTO: 14 % — SIGNIFICANT CHANGE UP (ref 13–44)
MCHC RBC-ENTMCNC: 22.5 PG — LOW (ref 27–34)
MCHC RBC-ENTMCNC: 29.7 GM/DL — LOW (ref 32–36)
MCV RBC AUTO: 75.8 FL — LOW (ref 80–100)
MONOCYTES # BLD AUTO: 0.37 K/UL — SIGNIFICANT CHANGE UP (ref 0–0.9)
MONOCYTES NFR BLD AUTO: 7.1 % — SIGNIFICANT CHANGE UP (ref 2–14)
NEUTROPHILS # BLD AUTO: 4.09 K/UL — SIGNIFICANT CHANGE UP (ref 1.8–7.4)
NEUTROPHILS NFR BLD AUTO: 78.1 % — HIGH (ref 43–77)
NRBC # BLD: 0 /100 WBCS — SIGNIFICANT CHANGE UP (ref 0–0)
PLATELET # BLD AUTO: 201 K/UL — SIGNIFICANT CHANGE UP (ref 150–400)
POTASSIUM SERPL-MCNC: 3.9 MMOL/L — SIGNIFICANT CHANGE UP (ref 3.5–5.3)
POTASSIUM SERPL-SCNC: 3.9 MMOL/L — SIGNIFICANT CHANGE UP (ref 3.5–5.3)
PROT SERPL-MCNC: 6.6 G/DL — SIGNIFICANT CHANGE UP (ref 6–8.3)
PROTHROM AB SERPL-ACNC: 19.6 SEC — HIGH (ref 10.5–13.4)
RBC # BLD: 4.71 M/UL — SIGNIFICANT CHANGE UP (ref 3.8–5.2)
RBC # FLD: 18.4 % — HIGH (ref 10.3–14.5)
SODIUM SERPL-SCNC: 138 MMOL/L — SIGNIFICANT CHANGE UP (ref 135–145)
WBC # BLD: 5.23 K/UL — SIGNIFICANT CHANGE UP (ref 3.8–10.5)
WBC # FLD AUTO: 5.23 K/UL — SIGNIFICANT CHANGE UP (ref 3.8–10.5)

## 2022-11-28 PROCEDURE — 99232 SBSQ HOSP IP/OBS MODERATE 35: CPT

## 2022-11-28 PROCEDURE — 99232 SBSQ HOSP IP/OBS MODERATE 35: CPT | Mod: FS,24

## 2022-11-28 RX ORDER — DEXAMETHASONE 0.5 MG/5ML
6 ELIXIR ORAL DAILY
Refills: 0 | Status: DISCONTINUED | OUTPATIENT
Start: 2022-11-28 | End: 2022-11-30

## 2022-11-28 RX ORDER — ONDANSETRON 8 MG/1
4 TABLET, FILM COATED ORAL ONCE
Refills: 0 | Status: COMPLETED | OUTPATIENT
Start: 2022-11-28 | End: 2022-11-28

## 2022-11-28 RX ORDER — POTASSIUM BICARBONATE 978 MG/1
25 TABLET, EFFERVESCENT ORAL ONCE
Refills: 0 | Status: COMPLETED | OUTPATIENT
Start: 2022-11-28 | End: 2022-11-28

## 2022-11-28 RX ADMIN — Medication 1 APPLICATION(S): at 17:53

## 2022-11-28 RX ADMIN — Medication 6 MILLIGRAM(S): at 15:09

## 2022-11-28 RX ADMIN — MAGNESIUM OXIDE 400 MG ORAL TABLET 400 MILLIGRAM(S): 241.3 TABLET ORAL at 06:06

## 2022-11-28 RX ADMIN — Medication 4 MILLILITER(S): at 17:51

## 2022-11-28 RX ADMIN — ATORVASTATIN CALCIUM 20 MILLIGRAM(S): 80 TABLET, FILM COATED ORAL at 22:03

## 2022-11-28 RX ADMIN — Medication 0.5 MILLIGRAM(S): at 17:50

## 2022-11-28 RX ADMIN — Medication 0.5 MILLIGRAM(S): at 12:53

## 2022-11-28 RX ADMIN — Medication 1 TABLET(S): at 12:16

## 2022-11-28 RX ADMIN — Medication 2: at 22:04

## 2022-11-28 RX ADMIN — Medication 12.5 MILLIGRAM(S): at 17:51

## 2022-11-28 RX ADMIN — PANTOPRAZOLE SODIUM 40 MILLIGRAM(S): 20 TABLET, DELAYED RELEASE ORAL at 06:05

## 2022-11-28 RX ADMIN — Medication 50 MILLIGRAM(S): at 22:03

## 2022-11-28 RX ADMIN — SODIUM CHLORIDE 3 MILLILITER(S): 9 INJECTION INTRAMUSCULAR; INTRAVENOUS; SUBCUTANEOUS at 06:07

## 2022-11-28 RX ADMIN — APIXABAN 5 MILLIGRAM(S): 2.5 TABLET, FILM COATED ORAL at 17:51

## 2022-11-28 RX ADMIN — Medication 1 APPLICATION(S): at 06:07

## 2022-11-28 RX ADMIN — REMDESIVIR 500 MILLIGRAM(S): 5 INJECTION INTRAVENOUS at 17:52

## 2022-11-28 RX ADMIN — SODIUM CHLORIDE 3 MILLILITER(S): 9 INJECTION INTRAMUSCULAR; INTRAVENOUS; SUBCUTANEOUS at 14:55

## 2022-11-28 RX ADMIN — MUPIROCIN 1 APPLICATION(S): 20 OINTMENT TOPICAL at 06:07

## 2022-11-28 RX ADMIN — Medication 3 MILLILITER(S): at 06:06

## 2022-11-28 RX ADMIN — Medication 3 MILLILITER(S): at 17:50

## 2022-11-28 RX ADMIN — POTASSIUM BICARBONATE 25 MILLIEQUIVALENT(S): 978 TABLET, EFFERVESCENT ORAL at 15:08

## 2022-11-28 RX ADMIN — MEXILETINE HYDROCHLORIDE 200 MILLIGRAM(S): 150 CAPSULE ORAL at 06:07

## 2022-11-28 RX ADMIN — NYSTATIN CREAM 1 APPLICATION(S): 100000 CREAM TOPICAL at 17:52

## 2022-11-28 RX ADMIN — Medication 1 TABLET(S): at 12:13

## 2022-11-28 RX ADMIN — Medication 3 MILLILITER(S): at 12:54

## 2022-11-28 RX ADMIN — Medication 500 MILLIGRAM(S): at 12:17

## 2022-11-28 RX ADMIN — NYSTATIN CREAM 1 APPLICATION(S): 100000 CREAM TOPICAL at 06:07

## 2022-11-28 RX ADMIN — Medication 4 MILLILITER(S): at 12:53

## 2022-11-28 RX ADMIN — Medication 12.5 MILLIGRAM(S): at 06:06

## 2022-11-28 RX ADMIN — FLUCONAZOLE 400 MILLIGRAM(S): 150 TABLET ORAL at 12:14

## 2022-11-28 RX ADMIN — Medication 50 MILLIGRAM(S): at 15:09

## 2022-11-28 RX ADMIN — Medication 1 APPLICATION(S): at 12:54

## 2022-11-28 RX ADMIN — APIXABAN 5 MILLIGRAM(S): 2.5 TABLET, FILM COATED ORAL at 06:05

## 2022-11-28 RX ADMIN — Medication 8 MILLIGRAM(S): at 06:05

## 2022-11-28 RX ADMIN — MAGNESIUM OXIDE 400 MG ORAL TABLET 400 MILLIGRAM(S): 241.3 TABLET ORAL at 15:10

## 2022-11-28 RX ADMIN — CHLORHEXIDINE GLUCONATE 1 APPLICATION(S): 213 SOLUTION TOPICAL at 13:19

## 2022-11-28 RX ADMIN — Medication 3: at 15:51

## 2022-11-28 RX ADMIN — ONDANSETRON 4 MILLIGRAM(S): 8 TABLET, FILM COATED ORAL at 10:09

## 2022-11-28 RX ADMIN — SODIUM CHLORIDE 3 MILLILITER(S): 9 INJECTION INTRAMUSCULAR; INTRAVENOUS; SUBCUTANEOUS at 21:36

## 2022-11-28 RX ADMIN — Medication 2: at 12:52

## 2022-11-28 RX ADMIN — MEXILETINE HYDROCHLORIDE 200 MILLIGRAM(S): 150 CAPSULE ORAL at 22:02

## 2022-11-28 RX ADMIN — MEXILETINE HYDROCHLORIDE 200 MILLIGRAM(S): 150 CAPSULE ORAL at 15:09

## 2022-11-28 RX ADMIN — Medication 50 MILLIGRAM(S): at 06:06

## 2022-11-28 RX ADMIN — MAGNESIUM OXIDE 400 MG ORAL TABLET 400 MILLIGRAM(S): 241.3 TABLET ORAL at 22:03

## 2022-11-28 NOTE — PROGRESS NOTE ADULT - ASSESSMENT
72 year-old-female with history of DMII, CAD, atrial fibrillation on eliquis, severe persistent asthma on chronic steroids, colon cancer s/p resection/chemo and recent extended hospitalization for type I aortic dissection s/p Bentall procedure and hemiarch replacement 9/6 with Dr. Cabrera. Patient presents today with 2-day history of vomiting and lethargy. Per daughter at bedside, patient was noted to be more lethargic today, and she lasted saw her on Monday. Patient reports that she has been feeling nauseous since Monday,    11/25 Pt covid + on admission from Rehab. ID and Pulmonary consulted for assistance in management with covid, asthma and history of tracheal malacia. Will initiate remdesivir  11/26 Supplemental O2 weaned. Pt remains afebrile O2 saturation >95% room air. ID consulted  11/27 #3/5 day remdesivir. No evidence of respiratory distress. No episodes of nausea/vomitting overnight.  11/28 VSS, afebrile,  RA SpO2 94%. Completing Day 4/5 of remdesivir. Abd Xray non obstructive gas/stool pattern.

## 2022-11-28 NOTE — SWALLOW BEDSIDE ASSESSMENT ADULT - COMMENTS
Pt well known to this service with multiple speech and swallow visits. On 11/16/2022 most recent MBS performed: 71F PMH h/o COPD, tracheomalacia s/p tracheoplasty, s/p Type A aortic dissection, multiple intubations throughout hospital course, s/p aspiration event 9/28 subsequently intubated and trach placed 10/11, decannulated 11/12. Patient p/w oropharyngeal, likely acute related to prolonged hospitalization and recent decannulation. Silent laryngeal penetration appreciated before the swallow with thin liquids and mildly thick liquids via straw sips due to reduced bolus control and poor airway closure. No aspiration appreciated on examination across all consistencies trialed. Solids not trialed given patient's inability to wear upper dentures due to discomfort, however pending patient's ability to wear upper dentures, can reassess solid trials at the bedside. Patient is a good candidate for behavioral swallow rehabilitation given intact cognition, ambulatory, and good family support.  Recommendations: Puree/mildly thick liquids, single sips, no straws  crush medication (when feasible); no straws; oral hygiene; position upright (90 degrees); small sips/bites

## 2022-11-28 NOTE — PROGRESS NOTE ADULT - ASSESSMENT
72 year-old-female with history of DMII, CAD, atrial fibrillation on eliquis, severe persistent asthma on chronic steroids, colon cancer s/p resection/chemo and recent extended hospitalization for type I aortic dissection s/p Bentall procedure and hemiarch replacement 9/6 with Dr. Cabrera. Patient presented with 2-day history of vomiting and lethargy. Found to be covid positive.     #severe persistent asthma  #covid   - dexamethasone 6mg-for 10 days total    - Remdesevir x5   - singulair 10- duoneb q 6    - budes .5 bid       O2 goal <90%    # TBM  -s/p tracheoplasty     acapella/VEST rx        - mucomyst here 2 cc 10% q 8 (secretions)    #s/p Aortic aneurysm repair  --as per [facility]TS staff care       AF-on eliquis rx               CV-improved hydralazine/lopressor/mexilitine   ******************************            SEE Below:  11/28-better but wants different rehab

## 2022-11-28 NOTE — SWALLOW BEDSIDE ASSESSMENT ADULT - SWALLOW EVAL: PATIENT/FAMILY GOALS STATEMENT
Pt reported good tolerance of dysphagia diet and noted eating slowly and closely following safe swallow guidelines. Denied c/o dysphagia with current PO diet.

## 2022-11-28 NOTE — PROGRESS NOTE ADULT - SUBJECTIVE AND OBJECTIVE BOX
Subjective: Pt states "Hello" denies any CP or SOB. No acute events overnight.     Telemetry:  SR 60 - 80  Vital Signs Last 24 Hrs  T(C): 36.6 (11-28-22 @ 04:51), Max: 36.9 (11-27-22 @ 13:21)  T(F): 97.9 (11-28-22 @ 04:51), Max: 98.4 (11-27-22 @ 13:21)  HR: 70 (11-28-22 @ 04:51) (70 - 78)  BP: 123/70 (11-28-22 @ 04:51) (114/73 - 123/70)  RR: 18 (11-28-22 @ 04:51) (18 - 18)  SpO2: 94% (11-28-22 @ 04:51) (94% - 96%)             11-27 @ 07:01  -  11-28 @ 07:00  --------------------------------------------------------  IN: 1425 mL / OUT: 1151 mL / NET: 274 mL        11-27    x   |  x   |  x   ----------------------------<  x   x    |  x   |  0.49<L>    AST  24  /  ALT  17  /  AlkPhos  94  11-27        CAPILLARY BLOOD GLUCOSE  151 - 175      PHYSICAL EXAM  Neurology: A&Ox3, NAD  CV : RRR+S1S2  Sternal Wound: MSI CDI , Stable  Lungs: Respirations non-labored, B/L BS  Abdomen: Soft, NT/ND, +BSx4Q, last BM   (-)N/V/D  : Voiding without difficulty  Extremities: B/L LE edema, negative calf tenderness, +PP , SVG incision           MEDICATIONS  acetylcysteine 10%  Inhalation 4 milliLiter(s) Inhalation every 12 hours  albuterol/ipratropium for Nebulization 3 milliLiter(s) Nebulizer every 6 hours  apixaban 5 milliGRAM(s) Oral every 12 hours  ascorbic acid 500 milliGRAM(s) Oral daily  atorvastatin 20 milliGRAM(s) Oral at bedtime  buDESOnide    Inhalation Suspension 0.5 milliGRAM(s) Inhalation two times a day  calamine/zinc oxide Lotion 1 Application(s) Topical every 8 hours PRN  chlorhexidine 2% Cloths 1 Application(s) Topical daily  collagenase Ointment 1 Application(s) Topical daily  fluconAZOLE   Tablet 400 milliGRAM(s) Oral daily  hydrALAZINE 50 milliGRAM(s) Oral three times a day  insulin lispro (ADMELOG) corrective regimen sliding scale   SubCutaneous three times a day before meals  insulin lispro (ADMELOG) corrective regimen sliding scale   SubCutaneous at bedtime  magnesium oxide 400 milliGRAM(s) Oral every 8 hours  methylPREDNISolone 8 milliGRAM(s) Oral daily  metoprolol tartrate 12.5 milliGRAM(s) Oral two times a day  mexiletine 200 milliGRAM(s) Oral every 8 hours  multivitamin 1 Tablet(s) Oral daily  mupirocin 2% Ointment 1 Application(s) Topical <User Schedule>  nystatin Powder 1 Application(s) Topical two times a day  ondansetron  IVPB 4 milliGRAM(s) IV Intermittent every 8 hours PRN  pantoprazole    Tablet 40 milliGRAM(s) Oral before breakfast  remdesivir  IVPB   IV Intermittent   remdesivir  IVPB 100 milliGRAM(s) IV Intermittent every 24 hours  silver sulfADIAZINE 1% Cream 1 Application(s) Topical two times a day  sodium chloride 0.9% lock flush 3 milliLiter(s) IV Push every 8 hours  sodium chloride 0.9%. 1000 milliLiter(s) IV Continuous <Continuous>  trimethoprim   80 mG/sulfamethoxazole 400 mG 1 Tablet(s) Oral daily      Physical Therapy Rec:   Home  [  ]   Home w/ PT  [  ]  Rehab  [ X ]    Discussed with Cardiothoracic Team at AM rounds. Subjective: Pt states "Hello" denies any CP or SOB. No acute events overnight.     Telemetry:  SR 60 - 80  Vital Signs Last 24 Hrs  T(C): 36.6 (11-28-22 @ 04:51), Max: 36.9 (11-27-22 @ 13:21)  T(F): 97.9 (11-28-22 @ 04:51), Max: 98.4 (11-27-22 @ 13:21)  HR: 70 (11-28-22 @ 04:51) (70 - 78)  BP: 123/70 (11-28-22 @ 04:51) (114/73 - 123/70)  RR: 18 (11-28-22 @ 04:51) (18 - 18)  SpO2: 94% (11-28-22 @ 04:51) (94% - 96%)             11-27 @ 07:01  -  11-28 @ 07:00  --------------------------------------------------------  IN: 1425 mL / OUT: 1151 mL / NET: 274 mL        AST  24  /  ALT  17  /  AlkPhos  94  11-27        CAPILLARY BLOOD GLUCOSE  151 - 175      PHYSICAL EXAM  Neurology: A&Ox3, NAD  CV : RRR+S1S2  Sternal Wound: MSI CDI BARRETT, Stable  Lungs: Respirations non-labored, B/L BS clear, diminished at bases  Abdomen: Soft, NT/ND, +BSx4Q, +colostomy with stool  : Voiding without difficulty  Extremities: B/L LE no edema, negative calf tenderness, +PP            MEDICATIONS  acetylcysteine 10%  Inhalation 4 milliLiter(s) Inhalation every 12 hours  albuterol/ipratropium for Nebulization 3 milliLiter(s) Nebulizer every 6 hours  apixaban 5 milliGRAM(s) Oral every 12 hours  ascorbic acid 500 milliGRAM(s) Oral daily  atorvastatin 20 milliGRAM(s) Oral at bedtime  buDESOnide    Inhalation Suspension 0.5 milliGRAM(s) Inhalation two times a day  calamine/zinc oxide Lotion 1 Application(s) Topical every 8 hours PRN  chlorhexidine 2% Cloths 1 Application(s) Topical daily  collagenase Ointment 1 Application(s) Topical daily  fluconAZOLE   Tablet 400 milliGRAM(s) Oral daily  hydrALAZINE 50 milliGRAM(s) Oral three times a day  insulin lispro (ADMELOG) corrective regimen sliding scale   SubCutaneous three times a day before meals  insulin lispro (ADMELOG) corrective regimen sliding scale   SubCutaneous at bedtime  magnesium oxide 400 milliGRAM(s) Oral every 8 hours  methylPREDNISolone 8 milliGRAM(s) Oral daily  metoprolol tartrate 12.5 milliGRAM(s) Oral two times a day  mexiletine 200 milliGRAM(s) Oral every 8 hours  multivitamin 1 Tablet(s) Oral daily  mupirocin 2% Ointment 1 Application(s) Topical <User Schedule>  nystatin Powder 1 Application(s) Topical two times a day  ondansetron  IVPB 4 milliGRAM(s) IV Intermittent every 8 hours PRN  pantoprazole    Tablet 40 milliGRAM(s) Oral before breakfast  remdesivir  IVPB   IV Intermittent   remdesivir  IVPB 100 milliGRAM(s) IV Intermittent every 24 hours  silver sulfADIAZINE 1% Cream 1 Application(s) Topical two times a day  sodium chloride 0.9% lock flush 3 milliLiter(s) IV Push every 8 hours  sodium chloride 0.9%. 1000 milliLiter(s) IV Continuous <Continuous>  trimethoprim   80 mG/sulfamethoxazole 400 mG 1 Tablet(s) Oral daily      Physical Therapy Rec:   Home  [  ]   Home w/ PT  [  ]  Rehab  [ X ]    Discussed with Cardiothoracic Team at AM rounds.

## 2022-11-28 NOTE — SWALLOW BEDSIDE ASSESSMENT ADULT - CONSISTENCIES ADMINISTERED
mildly thick/pureed Chewables not trialed as pt edentulous. Thin liquids not trialed given recent h/o laryngeal penetration with incomplete retrieval.

## 2022-11-28 NOTE — PROGRESS NOTE ADULT - SUBJECTIVE AND OBJECTIVE BOX
CHIEF COMPLAINT: f/up sob, chronic resp failure, TBM, severe persistent asthma, VC dysfunction, Type A aortic dissection s/p repair w/modified "Bentall procedure and hemiarch replacement 9/6- decannulated-on RA-readmit w/ covid 19 on 11/24-some mucus but able to bring up    Interval Events: remdisivir rx in place    REVIEW OF SYSTEMS:  Constitutional: No fevers or chills. No weight loss. No fatigue or generalized malaise.  Eyes: No itching or discharge from the eyes  ENT: No ear pain. No ear discharge. No nasal congestion. No post nasal drip. No epistaxis. No throat pain. No sore throat. No difficulty swallowing.   CV: No chest pain. No palpitations. No lightheadedness or dizziness.   Resp: No dyspnea at rest. No dyspnea on exertion. No orthopnea. No wheezing. + cough. No stridor. No sputum production. No chest pain with respiration.  GI: No nausea. No vomiting. No diarrhea.  MSK: No joint pain or pain in any extremities  Integumentary: No skin lesions. No pedal edema.  Neurological: No gross motor weakness. No sensory changes.  [+ ] All other systems negative  [ ] Unable to assess ROS because ________    OBJECTIVE:  ICU Vital Signs Last 24 Hrs  T(C): 36.6 (28 Nov 2022 04:51), Max: 36.9 (27 Nov 2022 13:21)  T(F): 97.9 (28 Nov 2022 04:51), Max: 98.4 (27 Nov 2022 13:21)  HR: 70 (28 Nov 2022 04:51) (70 - 78)  BP: 123/70 (28 Nov 2022 04:51) (114/73 - 123/70)  BP(mean): 88 (28 Nov 2022 04:51) (88 - 88)  ABP: --  ABP(mean): --  RR: 18 (28 Nov 2022 04:51) (18 - 18)  SpO2: 94% (28 Nov 2022 04:51) (94% - 96%)    O2 Parameters below as of 28 Nov 2022 04:51  Patient On (Oxygen Delivery Method): room air              11-26 @ 07:01  -  11-27 @ 07:00  --------------------------------------------------------  IN: 2210 mL / OUT: 800 mL / NET: 1410 mL    11-27 @ 07:01  - 11-28 @ 05:26  --------------------------------------------------------  IN: 1425 mL / OUT: 1151 mL / NET: 274 mL      CAPILLARY BLOOD GLUCOSE      POCT Blood Glucose.: 175 mg/dL (27 Nov 2022 21:27)      PHYSICAL EXAM: NAD in bed on RA  General: Awake, alert, oriented X 3.   HEENT: Atraumatic, normocephalic.                 Mallampatti Grade 2                No nasal congestion.                No tonsillar or pharyngeal exudates.  Lymph Nodes: No palpable lymphadenopathy  Neck: No JVD. No carotid bruit.   Respiratory: Normal chest expansion                         Normal percussion                         Normal and equal air entry                         + wheeze/ rhonchi but no rales.  Cardiovascular: S1 S2 normal. No murmurs, rubs or gallops.   Abdomen: Soft, non-tender, non-distended. No organomegaly. Normoactive bowel sounds.  Extremities: Warm to touch. Peripheral pulse palpable. No pedal edema.   Skin: No rashes or skin lesions  Neurological: Motor and sensory examination equal and normal in all four extremities.  Psychiatry: Appropriate mood and affect.    HOSPITAL MEDICATIONS:  MEDICATIONS  (STANDING):  acetylcysteine 10%  Inhalation 4 milliLiter(s) Inhalation every 12 hours  albuterol/ipratropium for Nebulization 3 milliLiter(s) Nebulizer every 6 hours  apixaban 5 milliGRAM(s) Oral every 12 hours  ascorbic acid 500 milliGRAM(s) Oral daily  atorvastatin 20 milliGRAM(s) Oral at bedtime  buDESOnide    Inhalation Suspension 0.5 milliGRAM(s) Inhalation two times a day  chlorhexidine 2% Cloths 1 Application(s) Topical daily  collagenase Ointment 1 Application(s) Topical daily  fluconAZOLE   Tablet 400 milliGRAM(s) Oral daily  hydrALAZINE 50 milliGRAM(s) Oral three times a day  insulin lispro (ADMELOG) corrective regimen sliding scale   SubCutaneous three times a day before meals  insulin lispro (ADMELOG) corrective regimen sliding scale   SubCutaneous at bedtime  magnesium oxide 400 milliGRAM(s) Oral every 8 hours  methylPREDNISolone 8 milliGRAM(s) Oral daily  metoprolol tartrate 12.5 milliGRAM(s) Oral two times a day  mexiletine 200 milliGRAM(s) Oral every 8 hours  multivitamin 1 Tablet(s) Oral daily  mupirocin 2% Ointment 1 Application(s) Topical <User Schedule>  nystatin Powder 1 Application(s) Topical two times a day  pantoprazole    Tablet 40 milliGRAM(s) Oral before breakfast  remdesivir  IVPB 100 milliGRAM(s) IV Intermittent every 24 hours  remdesivir  IVPB   IV Intermittent   silver sulfADIAZINE 1% Cream 1 Application(s) Topical two times a day  sodium chloride 0.9% lock flush 3 milliLiter(s) IV Push every 8 hours  sodium chloride 0.9%. 1000 milliLiter(s) (45 mL/Hr) IV Continuous <Continuous>  trimethoprim   80 mG/sulfamethoxazole 400 mG 1 Tablet(s) Oral daily    MEDICATIONS  (PRN):  calamine/zinc oxide Lotion 1 Application(s) Topical every 8 hours PRN Itching  ondansetron  IVPB 4 milliGRAM(s) IV Intermittent every 8 hours PRN Nausea      LABS:                        11.1   9.05  )-----------( 227      ( 26 Nov 2022 08:13 )             38.0     11-27    x   |  x   |  x   ----------------------------<  x   x    |  x   |  0.49<L>    Ca    9.4      26 Nov 2022 08:13    TPro  6.7  /  Alb  2.9<L>  /  TBili  0.2  /  DBili  <0.1  /  AST  24  /  ALT  17  /  AlkPhos  94  11-27    PT/INR - ( 27 Nov 2022 10:48 )   PT: 21.1 sec;   INR: 1.81 ratio                   MICROBIOLOGY:     RADIOLOGY:  [ ] Reviewed and interpreted by me    Point of Care Ultrasound Findings:    PFT:    EKG:

## 2022-11-28 NOTE — SWALLOW BEDSIDE ASSESSMENT ADULT - SLP PERTINENT HISTORY OF CURRENT PROBLEM
Pt admitted for Nausea/ Lethargy.  ID and Pulmonary consulted for assistance in management with covid, asthma and history of tracheal malacia. Initiate remdesivir 11/25.

## 2022-11-28 NOTE — CHART NOTE - NSCHARTNOTEFT_GEN_A_CORE
Nutrition Follow Up Note  Patient seen for: malnutrition follow up / verbal consult for assessment    Per chart: "72 year-old-female with history of DMII, CAD, atrial fibrillation on eliquis, severe persistent asthma on chronic steroids, colon cancer s/p resection/chemo and recent extended hospitalization for type I aortic dissection s/p Bentall procedure and hemiarch replacement 9/6 with Dr. Cabrera. Patient presents today with 2-day history of vomiting and lethargy. Per daughter at bedside, patient was noted to be more lethargic today, and she lasted saw her on Monday. Patient reports that she has been feeling nauseous since Monday,    11/25 Pt covid + on admission from Rehab."  . S/p MBS previous admission 11/16 "Puree/mildly thick liquids, single sips, no straws."    Source: [x] Patient       [x] Medical Record        [x] RN and PCA       [] Family at bedside       [] Other:    -If unable to interview patient: [] Trach/Vent/BiPAP  [] Disoriented/confused/inappropriate to interview    Diet Order:   Diet, Pureed:   Consistent Carbohydrate {No Snacks} (CSTCHO)  DASH/TLC {Sodium & Cholesterol Restricted} (DASH)  Mildly Thick Liquids (MILDTHICKLIQS) (11-25-22)    - Is current order appropriate/adequate? see below for recommendations    - PO intake :   [] >75%  Adequate    [] 50-75%  Fair       [x] <50%  Poor  Pt with many food preferences, writer obtained.     Nutrition-related concerns:  -on insulin regimen for glycemic control  -remdesivir ordered    GI: Colostomy bag, last output noted yesterday per flow sheets. still with some nausea, likely secondary to covid. Bowel Regimen? [] Yes   [x] No    Dosing weight 62.1kg 11/24  Pt reports UBW 140lbs / 63.6kg  Per dietitian initial evaluation 11/26: " Pt 151.8lbs on 11/14 per previous RD note however pt in ICU care at the time likely with fluid retention."  RD will continue to monitor trends    Nutritionally Pertinent MEDICATIONS  (STANDING):  ascorbic acid  atorvastatin  fluconAZOLE   Tablet  hydrALAZINE  insulin lispro (ADMELOG) corrective regimen sliding scale  insulin lispro (ADMELOG) corrective regimen sliding scale  magnesium oxide  methylPREDNISolone  metoprolol tartrate  mexiletine  multivitamin  pantoprazole    Tablet  remdesivir  IVPB  remdesivir  IVPB  sodium chloride 0.9% lock flush  sodium chloride 0.9%.  trimethoprim   80 mG/sulfamethoxazole 400 mG    Pertinent Labs: 11-27 @ 10:48: Na --, BUN --, Cr 0.49<L>, BG --, K+ --, Phos --, Mg --, Alk Phos 94, ALT/SGPT 17, AST/SGOT 24, HbA1c --    A1C with Estimated Average Glucose Result: 9.4 % (09-06-22 @ 16:46)  A1C with Estimated Average Glucose Result: 9.2 % (07-11-22 @ 07:36)    Finger Sticks:  POCT Blood Glucose.: 151 mg/dL (11-28 @ 08:05)  POCT Blood Glucose.: 175 mg/dL (11-27 @ 21:27)  POCT Blood Glucose.: 329 mg/dL (11-27 @ 16:34)  POCT Blood Glucose.: 261 mg/dL (11-27 @ 12:59)      Skin per nursing documentation: left heel suspected deep tissue injury, right medial ankle suspected deep tissue injury, right elbow stage II   Edema per nursing documentation: none noted per flow sheets    Estimated Needs:   [x] no change since previous assessment  [] recalculated:     Previous Nutrition Diagnosis: Severe acute malnutrition, Increased Nutrient Needs  Nutrition Diagnosis is: [x] ongoing  [] resolved [] not applicable     Nutrition Care Plan:  [x] In Progress  [] Achieved  [] Not applicable    New Nutrition Diagnosis: [x] Not applicable    Nutrition Interventions:  Encouraged PO intake at meals. Reinforced: small frequent meals, dry foods, not drinking fluid with meals, not laying down during and immediately after eating.     Recommendations:      -Recommend remove DASH/TLC restriction to liberalize diet due to poor PO intake. Food preferences obtained, RD to honor as feasible.  -Defer diet texture/liquid consistency to team/SLP recommendations.   -Trial Glucerna Shake 240mls 1x daily (220kcals, 10g protein), monitor for tolerance  -Continue vitamin C and Multivitamin to aid in wound healing and to prevent micronutrient deficiencies considering poor PO intake    Monitoring and Evaluation:   Continue to monitor nutritional intake, tolerance to diet prescription, weights, labs, skin integrity    RD remains available upon request and will follow up per protocol  Shari Paz, MS, RD, CDN Nutrition Follow Up Note  Patient seen for: malnutrition follow up / verbal consult for assessment    Per chart: "72 year-old-female with history of DMII, CAD, atrial fibrillation on eliquis, severe persistent asthma on chronic steroids, colon cancer s/p resection/chemo and recent extended hospitalization for type I aortic dissection s/p Bentall procedure and hemiarch replacement 9/6 with Dr. Cabrera. Patient presents today with 2-day history of vomiting and lethargy. Per daughter at bedside, patient was noted to be more lethargic today, and she lasted saw her on Monday. Patient reports that she has been feeling nauseous since Monday,    11/25 Pt covid + on admission from Rehab."  . S/p MBS previous admission 11/16 "Puree/mildly thick liquids, single sips, no straws."    Source: [x] Patient       [x] Medical Record        [x] RN and PCA       [] Family at bedside       [] Other:    -If unable to interview patient: [] Trach/Vent/BiPAP  [] Disoriented/confused/inappropriate to interview    Diet Order:   Diet, Pureed:   Consistent Carbohydrate {No Snacks} (CSTCHO)  DASH/TLC {Sodium & Cholesterol Restricted} (DASH)  Mildly Thick Liquids (MILDTHICKLIQS) (11-25-22)    - Is current order appropriate/adequate? see below for recommendations    - PO intake :   [] >75%  Adequate    [] 50-75%  Fair       [x] <50%  Poor  Pt with many food preferences, writer obtained.     Nutrition-related concerns:  -on insulin regimen for glycemic control  -remdesivir ordered    GI: Colostomy bag, last output noted yesterday per flow sheets. still with some nausea, likely secondary to covid. Bowel Regimen? [] Yes   [x] No    Dosing weight 62.1kg 11/24  Pt reports UBW 140lbs / 63.6kg  Per dietitian initial evaluation 11/26: " Pt 151.8lbs on 11/14 per previous RD note however pt in ICU care at the time likely with fluid retention."  RD will continue to monitor trends    Nutritionally Pertinent MEDICATIONS  (STANDING):  ascorbic acid  atorvastatin  fluconAZOLE   Tablet  hydrALAZINE  insulin lispro (ADMELOG) corrective regimen sliding scale  insulin lispro (ADMELOG) corrective regimen sliding scale  magnesium oxide  methylPREDNISolone  metoprolol tartrate  mexiletine  multivitamin  pantoprazole    Tablet  remdesivir  IVPB  remdesivir  IVPB  sodium chloride 0.9% lock flush  sodium chloride 0.9%.  trimethoprim   80 mG/sulfamethoxazole 400 mG    Pertinent Labs: 11-27 @ 10:48: Na --, BUN --, Cr 0.49<L>, BG --, K+ --, Phos --, Mg --, Alk Phos 94, ALT/SGPT 17, AST/SGOT 24, HbA1c --    A1C with Estimated Average Glucose Result: 9.4 % (09-06-22 @ 16:46)  A1C with Estimated Average Glucose Result: 9.2 % (07-11-22 @ 07:36)    Finger Sticks:  POCT Blood Glucose.: 151 mg/dL (11-28 @ 08:05)  POCT Blood Glucose.: 175 mg/dL (11-27 @ 21:27)  POCT Blood Glucose.: 329 mg/dL (11-27 @ 16:34)  POCT Blood Glucose.: 261 mg/dL (11-27 @ 12:59)      Skin per nursing documentation: left heel suspected deep tissue injury, right medial ankle suspected deep tissue injury, right elbow stage II   Edema per nursing documentation: none noted per flow sheets    Estimated Needs:   [x] no change since previous assessment  [] recalculated:     Previous Nutrition Diagnosis: Severe acute malnutrition, Increased Nutrient Needs  Nutrition Diagnosis is: [x] ongoing  [] resolved [] not applicable     Nutrition Care Plan:  [x] In Progress  [] Achieved  [] Not applicable    New Nutrition Diagnosis: [x] Not applicable    Nutrition Interventions:  Encouraged PO intake at meals. Reinforced: small frequent meals, dry foods, not drinking fluid with meals, not laying down during and immediately after eating.     Recommendations:      -Recommend remove DASH/TLC restriction to liberalize diet due to poor PO intake. Food preferences obtained, RD to honor as feasible.  -Defer diet texture/liquid consistency to team/SLP recommendations.   -Trial Glucerna Shake 240mls 1x daily (220kcals, 10g protein), monitor for tolerance  -Continue vitamin C and Multivitamin to aid in wound healing and to prevent micronutrient deficiencies considering poor PO intake  -Consider SLP evaluation this admission.     Monitoring and Evaluation:   Continue to monitor nutritional intake, tolerance to diet prescription, weights, labs, skin integrity    RD remains available upon request and will follow up per protocol  Shari Paz MS, RD, CDN

## 2022-11-28 NOTE — SWALLOW BEDSIDE ASSESSMENT ADULT - H & P REVIEW
72 year-old-female with history of DMII, CAD, atrial fibrillation on eliquis, severe persistent asthma on chronic steroids, colon cancer s/p resection/chemo and recent extended hospitalization for type I aortic dissection s/p Bentall procedure and hemiarch replacement 9/6 with Dr. Cabrera. Patient presents today with 2-day history of vomiting and lethargy. Per daughter at bedside, patient was noted to be more lethargic today, and she lasted saw her on Monday. Patient reports that she has been feeling nauseous since Monday, and has been throwing up since then. Patient however denies chest pain, shortness of breath, abdominal pain, difficulty urinating./yes

## 2022-11-28 NOTE — SWALLOW BEDSIDE ASSESSMENT ADULT - SWALLOW EVAL: DIAGNOSIS
Pt is 71 y/o F well known to this service admitted for Nausea/ Lethargy. Most recently seen for MBS on 11/16 with recommendations for puree with mildly thick liquids. Now presenting with a suspected pharyngeal dysphagia functional to tolerate puree texture diet with mildly thick liquids. The swallow is marked by reduced hyolaryngeal excursion. No overt signs of laryngeal penetration/aspiration observed on PO trials offered.

## 2022-11-28 NOTE — SWALLOW BEDSIDE ASSESSMENT ADULT - ASR SWALLOW ASPIRATION MONITOR
Monitor for s/s aspiration/laryngeal penetration. If noted:  D/C p.o. intake, provide non-oral nutrition/hydration/meds, and contact this service @ [phone]/change of breathing pattern/cough/gurgly voice/fever/pneumonia/throat clearing/upper respiratory infection

## 2022-11-28 NOTE — SWALLOW BEDSIDE ASSESSMENT ADULT - SLP GENERAL OBSERVATIONS
Pt awake in bed, on room air, oriented x3. Able to communicate needs and follow directions for exam. Pleasant and cooperative. Good historian.

## 2022-11-29 DIAGNOSIS — I35.1 NONRHEUMATIC AORTIC (VALVE) INSUFFICIENCY: ICD-10-CM

## 2022-11-29 LAB
ALBUMIN SERPL ELPH-MCNC: 3 G/DL — LOW (ref 3.3–5)
ALP SERPL-CCNC: 92 U/L — SIGNIFICANT CHANGE UP (ref 40–120)
ALT FLD-CCNC: 18 U/L — SIGNIFICANT CHANGE UP (ref 10–45)
ANION GAP SERPL CALC-SCNC: 14 MMOL/L — SIGNIFICANT CHANGE UP (ref 5–17)
AST SERPL-CCNC: 26 U/L — SIGNIFICANT CHANGE UP (ref 10–40)
BILIRUB DIRECT SERPL-MCNC: <0.1 MG/DL — SIGNIFICANT CHANGE UP (ref 0–0.3)
BILIRUB INDIRECT FLD-MCNC: >0.1 MG/DL — LOW (ref 0.2–1)
BILIRUB SERPL-MCNC: 0.2 MG/DL — SIGNIFICANT CHANGE UP (ref 0.2–1.2)
BUN SERPL-MCNC: 8 MG/DL — SIGNIFICANT CHANGE UP (ref 7–23)
CALCIUM SERPL-MCNC: 8.8 MG/DL — SIGNIFICANT CHANGE UP (ref 8.4–10.5)
CHLORIDE SERPL-SCNC: 100 MMOL/L — SIGNIFICANT CHANGE UP (ref 96–108)
CO2 SERPL-SCNC: 20 MMOL/L — LOW (ref 22–31)
CREAT SERPL-MCNC: 0.41 MG/DL — LOW (ref 0.5–1.3)
CULTURE RESULTS: SIGNIFICANT CHANGE UP
CULTURE RESULTS: SIGNIFICANT CHANGE UP
EGFR: 103 ML/MIN/1.73M2 — SIGNIFICANT CHANGE UP
GLUCOSE BLDC GLUCOMTR-MCNC: 191 MG/DL — HIGH (ref 70–99)
GLUCOSE BLDC GLUCOMTR-MCNC: 216 MG/DL — HIGH (ref 70–99)
GLUCOSE BLDC GLUCOMTR-MCNC: 259 MG/DL — HIGH (ref 70–99)
GLUCOSE BLDC GLUCOMTR-MCNC: 406 MG/DL — HIGH (ref 70–99)
GLUCOSE SERPL-MCNC: 290 MG/DL — HIGH (ref 70–99)
POTASSIUM SERPL-MCNC: 4.7 MMOL/L — SIGNIFICANT CHANGE UP (ref 3.5–5.3)
POTASSIUM SERPL-SCNC: 4.7 MMOL/L — SIGNIFICANT CHANGE UP (ref 3.5–5.3)
PROT SERPL-MCNC: 6.5 G/DL — SIGNIFICANT CHANGE UP (ref 6–8.3)
SARS-COV-2 RNA SPEC QL NAA+PROBE: DETECTED
SODIUM SERPL-SCNC: 134 MMOL/L — LOW (ref 135–145)
SPECIMEN SOURCE: SIGNIFICANT CHANGE UP
SPECIMEN SOURCE: SIGNIFICANT CHANGE UP

## 2022-11-29 PROCEDURE — 99231 SBSQ HOSP IP/OBS SF/LOW 25: CPT | Mod: 24

## 2022-11-29 PROCEDURE — 99232 SBSQ HOSP IP/OBS MODERATE 35: CPT

## 2022-11-29 RX ORDER — INSULIN LISPRO 100/ML
VIAL (ML) SUBCUTANEOUS AT BEDTIME
Refills: 0 | Status: DISCONTINUED | OUTPATIENT
Start: 2022-11-29 | End: 2022-11-30

## 2022-11-29 RX ORDER — INSULIN LISPRO 100/ML
5 VIAL (ML) SUBCUTANEOUS
Refills: 0 | Status: DISCONTINUED | OUTPATIENT
Start: 2022-11-29 | End: 2022-11-30

## 2022-11-29 RX ORDER — INSULIN LISPRO 100/ML
VIAL (ML) SUBCUTANEOUS
Refills: 0 | Status: DISCONTINUED | OUTPATIENT
Start: 2022-11-29 | End: 2022-11-30

## 2022-11-29 RX ORDER — POLYETHYLENE GLYCOL 3350 17 G/17G
17 POWDER, FOR SOLUTION ORAL
Refills: 0 | Status: DISCONTINUED | OUTPATIENT
Start: 2022-11-29 | End: 2022-11-30

## 2022-11-29 RX ADMIN — SODIUM CHLORIDE 45 MILLILITER(S): 9 INJECTION INTRAMUSCULAR; INTRAVENOUS; SUBCUTANEOUS at 07:00

## 2022-11-29 RX ADMIN — FLUCONAZOLE 400 MILLIGRAM(S): 150 TABLET ORAL at 11:25

## 2022-11-29 RX ADMIN — Medication 3 MILLILITER(S): at 11:24

## 2022-11-29 RX ADMIN — PANTOPRAZOLE SODIUM 40 MILLIGRAM(S): 20 TABLET, DELAYED RELEASE ORAL at 06:18

## 2022-11-29 RX ADMIN — SODIUM CHLORIDE 3 MILLILITER(S): 9 INJECTION INTRAMUSCULAR; INTRAVENOUS; SUBCUTANEOUS at 13:41

## 2022-11-29 RX ADMIN — Medication 12.5 MILLIGRAM(S): at 17:21

## 2022-11-29 RX ADMIN — MAGNESIUM OXIDE 400 MG ORAL TABLET 400 MILLIGRAM(S): 241.3 TABLET ORAL at 06:19

## 2022-11-29 RX ADMIN — Medication 6 MILLIGRAM(S): at 06:18

## 2022-11-29 RX ADMIN — Medication 50 MILLIGRAM(S): at 06:19

## 2022-11-29 RX ADMIN — Medication 500 MILLIGRAM(S): at 11:25

## 2022-11-29 RX ADMIN — Medication 4 MILLILITER(S): at 06:16

## 2022-11-29 RX ADMIN — Medication 12.5 MILLIGRAM(S): at 06:18

## 2022-11-29 RX ADMIN — ATORVASTATIN CALCIUM 20 MILLIGRAM(S): 80 TABLET, FILM COATED ORAL at 21:57

## 2022-11-29 RX ADMIN — Medication 0.5 MILLIGRAM(S): at 17:21

## 2022-11-29 RX ADMIN — Medication 3 MILLILITER(S): at 06:17

## 2022-11-29 RX ADMIN — Medication 6: at 07:59

## 2022-11-29 RX ADMIN — MEXILETINE HYDROCHLORIDE 200 MILLIGRAM(S): 150 CAPSULE ORAL at 21:57

## 2022-11-29 RX ADMIN — Medication 5 UNIT(S): at 08:17

## 2022-11-29 RX ADMIN — Medication 12: at 17:23

## 2022-11-29 RX ADMIN — Medication 0.5 MILLIGRAM(S): at 06:19

## 2022-11-29 RX ADMIN — SODIUM CHLORIDE 3 MILLILITER(S): 9 INJECTION INTRAMUSCULAR; INTRAVENOUS; SUBCUTANEOUS at 22:52

## 2022-11-29 RX ADMIN — MAGNESIUM OXIDE 400 MG ORAL TABLET 400 MILLIGRAM(S): 241.3 TABLET ORAL at 13:48

## 2022-11-29 RX ADMIN — Medication 1 TABLET(S): at 11:25

## 2022-11-29 RX ADMIN — Medication 3 MILLILITER(S): at 17:22

## 2022-11-29 RX ADMIN — NYSTATIN CREAM 1 APPLICATION(S): 100000 CREAM TOPICAL at 16:47

## 2022-11-29 RX ADMIN — REMDESIVIR 500 MILLIGRAM(S): 5 INJECTION INTRAVENOUS at 17:21

## 2022-11-29 RX ADMIN — APIXABAN 5 MILLIGRAM(S): 2.5 TABLET, FILM COATED ORAL at 17:21

## 2022-11-29 RX ADMIN — POLYETHYLENE GLYCOL 3350 17 GRAM(S): 17 POWDER, FOR SOLUTION ORAL at 17:21

## 2022-11-29 RX ADMIN — APIXABAN 5 MILLIGRAM(S): 2.5 TABLET, FILM COATED ORAL at 06:19

## 2022-11-29 RX ADMIN — Medication 4 MILLILITER(S): at 17:22

## 2022-11-29 RX ADMIN — MEXILETINE HYDROCHLORIDE 200 MILLIGRAM(S): 150 CAPSULE ORAL at 06:18

## 2022-11-29 RX ADMIN — Medication 5 UNIT(S): at 11:25

## 2022-11-29 RX ADMIN — Medication 1 APPLICATION(S): at 11:41

## 2022-11-29 RX ADMIN — MAGNESIUM OXIDE 400 MG ORAL TABLET 400 MILLIGRAM(S): 241.3 TABLET ORAL at 21:58

## 2022-11-29 RX ADMIN — Medication 50 MILLIGRAM(S): at 21:57

## 2022-11-29 RX ADMIN — Medication 1 APPLICATION(S): at 22:52

## 2022-11-29 RX ADMIN — CHLORHEXIDINE GLUCONATE 1 APPLICATION(S): 213 SOLUTION TOPICAL at 10:42

## 2022-11-29 RX ADMIN — MUPIROCIN 1 APPLICATION(S): 20 OINTMENT TOPICAL at 11:41

## 2022-11-29 RX ADMIN — MEXILETINE HYDROCHLORIDE 200 MILLIGRAM(S): 150 CAPSULE ORAL at 13:48

## 2022-11-29 RX ADMIN — SODIUM CHLORIDE 3 MILLILITER(S): 9 INJECTION INTRAMUSCULAR; INTRAVENOUS; SUBCUTANEOUS at 06:59

## 2022-11-29 RX ADMIN — Medication 50 MILLIGRAM(S): at 13:48

## 2022-11-29 RX ADMIN — Medication 5 UNIT(S): at 17:22

## 2022-11-29 RX ADMIN — Medication 4: at 11:26

## 2022-11-29 NOTE — PROGRESS NOTE ADULT - ASSESSMENT
72 year-old-female with history of DMII, CAD, atrial fibrillation on eliquis, severe persistent asthma on chronic steroids, colon cancer s/p resection/chemo and recent extended hospitalization for type I aortic dissection s/p Bentall procedure and hemiarch replacement 9/6 with Dr. Cabrera. Patient presents today with 2-day history of vomiting and lethargy. Per daughter at bedside, patient was noted to be more lethargic today, and she lasted saw her on Monday. Patient reports that she has been feeling nauseous since Monday,    11/25 Pt covid + on admission from Rehab. ID and Pulmonary consulted for assistance in management with covid, asthma and history of tracheal malacia. Will initiate remdesivir  11/26 Supplemental O2 weaned. Pt remains afebrile O2 saturation >95% room air. ID consulted  11/27 #3/5 day remdesivir. No evidence of respiratory distress. No episodes of nausea/vomitting overnight.  11/28 VSS, afebrile,  RA SpO2 94%. Completing Day 4/5 of remdesivir. Abd Xray non obstructive gas/stool pattern.  11/29 72 year-old-female with history of DMII, CAD, atrial fibrillation on eliquis, severe persistent asthma on chronic steroids, colon cancer s/p resection/chemo and recent extended hospitalization for type I aortic dissection s/p Bentall procedure and hemiarch replacement 9/6 with Dr. Cabrera. Patient presents today with 2-day history of vomiting and lethargy. Per daughter at bedside, patient was noted to be more lethargic today, and she lasted saw her on Monday. Patient reports that she has been feeling nauseous since Monday,    11/25 Pt covid + on admission from Rehab. ID and Pulmonary consulted for assistance in management with covid, asthma and history of tracheal malacia. Will initiate remdesivir  11/26 Supplemental O2 weaned. Pt remains afebrile O2 saturation >95% room air. ID consulted  11/27 #3/5 day remdesivir. No evidence of respiratory distress. No episodes of nausea/vomitting overnight.  11/28 VSS, afebrile,  RA SpO2 94%. Completing Day 4/5 of remdesivir. Abd Xray non obstructive gas/stool pattern.  11/29   vss

## 2022-11-29 NOTE — PHYSICAL THERAPY INITIAL EVALUATION ADULT - PERTINENT HX OF CURRENT PROBLEM, REHAB EVAL
72 year-old-female with history of DMII, CAD, atrial fibrillation on eliquis, severe persistent asthma on chronic steroids, colon cancer s/p resection/chemo and recent extended hospitalization for type I aortic dissection s/p Bentall procedure and hemiarch replacement 9/6. Patient presents today with 2-day history of vomiting and lethargy. Started on remdesivir.

## 2022-11-29 NOTE — PROGRESS NOTE ADULT - ASSESSMENT
72 year-old-female with history of DMII, CAD, atrial fibrillation on eliquis, severe persistent asthma on chronic steroids, colon cancer s/p resection/chemo and recent extended hospitalization for type I aortic dissection s/p Bentall procedure and hemiarch replacement 9/6 with Dr. Cabrera. Patient presented with 2-day history of vomiting and lethargy. Found to be covid positive.     #severe persistent asthma  #covid   - dexamethasone 6mg-for 10 days total    - Remdesevir x5   - singulair 10- duoneb q 6    - budes .5 bid       O2 goal <90%    # TBM  -s/p tracheoplasty     acapella/VEST rx        - mucomyst here 2 cc 10% q 8 (secretions)    #s/p Aortic aneurysm repair  --as per [facility] staff care       AF-on eliquis rx               CV-improved hydralazine/lopressor/mexilitine   ******************************            SEE Below:  11/28-better but wants different rehab  11/29-D5/5 rem, D2/10 dex, DC planning

## 2022-11-29 NOTE — PHYSICAL THERAPY INITIAL EVALUATION ADULT - ADDITIONAL COMMENTS
Pt lives w/ sister ([place] pt) in a pvt house w/ 14 steps to enter. PTA amb indep w/o a device. Pt recently from [facility] w/ plans to return then to stay a dtr's home for short period.

## 2022-11-29 NOTE — PROGRESS NOTE ADULT - NUTRITIONAL ASSESSMENT
This patient has been assessed with a concern for Malnutrition and has been determined to have a diagnosis/diagnoses of Severe protein-calorie malnutrition.    This patient is being managed with:   Diet Pureed-  Consistent Carbohydrate {No Snacks} (CSTCHO)  DASH/TLC {Sodium & Cholesterol Restricted} (DASH)  Mildly Thick Liquids (MILDTHICKLIQS)  Entered: Nov 25 2022 12:07AM    
This patient has been assessed with a concern for Malnutrition and has been determined to have a diagnosis/diagnoses of Moderate protein-calorie malnutrition.    This patient is being managed with:   Diet Pureed-  Mildly Thick Liquids (MILDTHICKLIQS)  Liquid via Teaspoon Only No Straws  Entered: Nov 17 2022  5:44PM    
This patient has been assessed with a concern for Malnutrition and has been determined to have a diagnosis/diagnoses of Severe protein-calorie malnutrition.    This patient is being managed with:   Diet Pureed-  Consistent Carbohydrate {No Snacks} (CSTCHO)  Mildly Thick Liquids (MILDTHICKLIQS)  Supplement Feeding Modality:  Oral  Glucerna Shake Cans or Servings Per Day:  1       Frequency:  Daily  Entered: Nov 28 2022 12:00PM

## 2022-11-29 NOTE — PROGRESS NOTE ADULT - PROBLEM SELECTOR PLAN 1
Pt covid + on admission from Rehab. ID and Pulmonary consulted for assistance in management with covid, asthma and history of tracheal malacia. Initiate remdesivir 11/25 Pt covid + on admission from Rehab. ID and Pulmonary consulted for assistance in management with covid, asthma and history of tracheal malacia. Initiate remdesivir 11/25  completed today  rehab in am  resume medrol 8 mg on 12/9 per pulm

## 2022-11-29 NOTE — PROGRESS NOTE ADULT - SUBJECTIVE AND OBJECTIVE BOX
VITAL SIGNS    Telemetry:      Vital Signs Last 24 Hrs  T(C): 36.5 (11-29-22 @ 04:52), Max: 37 (11-28-22 @ 12:57)  T(F): 97.7 (11-29-22 @ 04:52), Max: 98.6 (11-28-22 @ 12:57)  HR: 69 (11-29-22 @ 04:52) (69 - 87)  BP: 144/78 (11-29-22 @ 04:52) (134/58 - 144/78)  RR: 18 (11-29-22 @ 04:52) (18 - 18)  SpO2: 98% (11-29-22 @ 04:52) (94% - 98%)                   Daily     Daily       Bilirubin Total, Serum: 0.2 mg/dL (11-28 @ 09:52)  Bilirubin Direct, Serum: <0.1 mg/dL (11-28 @ 09:52)    CAPILLARY BLOOD GLUCOSE      POCT Blood Glucose.: 275 mg/dL (28 Nov 2022 21:36)  POCT Blood Glucose.: 294 mg/dL (28 Nov 2022 15:42)  POCT Blood Glucose.: 242 mg/dL (28 Nov 2022 12:27)  POCT Blood Glucose.: 151 mg/dL (28 Nov 2022 08:05)                           PHYSICAL EXAM    Neurology: alert and oriented x 3, moves all extremities with no defecits  CV :  RRR  Sternal Wound :  CDI , Stable  Lungs:   CTA B/L  Abdomen: soft, nontender, nondistended, positive bowel sounds, last bowel movement   Extremities:                                            VITAL SIGNS    Telemetry:       sr 80    Vital Signs Last 24 Hrs  T(C): 36.5 (11-29-22 @ 04:52), Max: 37 (11-28-22 @ 12:57)  T(F): 97.7 (11-29-22 @ 04:52), Max: 98.6 (11-28-22 @ 12:57)  HR: 69 (11-29-22 @ 04:52) (69 - 87)  BP: 144/78 (11-29-22 @ 04:52) (134/58 - 144/78)  RR: 18 (11-29-22 @ 04:52) (18 - 18)  SpO2: 98% (11-29-22 @ 04:52) (94% - 98%)                   Daily     Daily       Bilirubin Total, Serum: 0.2 mg/dL (11-28 @ 09:52)  Bilirubin Direct, Serum: <0.1 mg/dL (11-28 @ 09:52)    CAPILLARY BLOOD GLUCOSE      POCT Blood Glucose.: 275 mg/dL (28 Nov 2022 21:36)  POCT Blood Glucose.: 294 mg/dL (28 Nov 2022 15:42)  POCT Blood Glucose.: 242 mg/dL (28 Nov 2022 12:27)  POCT Blood Glucose.: 151 mg/dL (28 Nov 2022 08:05)                           PHYSICAL EXAM  s  No sob   no cp"  Neurology: alert and oriented x 3, moves all extremities with no defecits  CV :  RRR  Sternal Wound :  CDI , Stable  Lungs:   CTA B/L  Abdomen: soft, nontender, nondistended, positive bowel sounds,  Extremities:     lt heal breakdown                                       VITAL SIGNS    Telemetry:       sr 80    Vital Signs Last 24 Hrs  T(C): 36.5 (11-29-22 @ 04:52), Max: 37 (11-28-22 @ 12:57)  T(F): 97.7 (11-29-22 @ 04:52), Max: 98.6 (11-28-22 @ 12:57)  HR: 69 (11-29-22 @ 04:52) (69 - 87)  BP: 144/78 (11-29-22 @ 04:52) (134/58 - 144/78)  RR: 18 (11-29-22 @ 04:52) (18 - 18)  SpO2: 98% (11-29-22 @ 04:52) (94% - 98%)                   Daily     Daily       Bilirubin Total, Serum: 0.2 mg/dL (11-28 @ 09:52)  Bilirubin Direct, Serum: <0.1 mg/dL (11-28 @ 09:52)    CAPILLARY BLOOD GLUCOSE      POCT Blood Glucose.: 275 mg/dL (28 Nov 2022 21:36)  POCT Blood Glucose.: 294 mg/dL (28 Nov 2022 15:42)  POCT Blood Glucose.: 242 mg/dL (28 Nov 2022 12:27)  POCT Blood Glucose.: 151 mg/dL (28 Nov 2022 08:05)                           PHYSICAL EXAM  s  No sob   no cp"  Neurology: alert and oriented x 3, moves all extremities with no defecits  CV :  RRR  Sternal Wound :  CDI , Stable  Lungs:   CTA B/L  Abdomen: soft, nontender, nondistended, positive bowel sounds,  + colostomy  Extremities:     lt heal breakdown

## 2022-11-29 NOTE — PROGRESS NOTE ADULT - SUBJECTIVE AND OBJECTIVE BOX
CHIEF COMPLAINT: f/up sob, chronic resp failure, TBM, severe persistent asthma, VC dysfunction, Type A aortic dissection s/p repair w/modified "Bentall procedure and hemiarch replacement 9/6- decannulated-on RA-readmit w/ covid 19 on 11/24-feels well, no new cough or sob    Interval Events: D5 remdisivir, D2/10 dex    REVIEW OF SYSTEMS:  Constitutional: No fevers or chills. No weight loss. No fatigue or generalized malaise.  Eyes: No itching or discharge from the eyes  ENT: No ear pain. No ear discharge. No nasal congestion. No post nasal drip. No epistaxis. No throat pain. No sore throat. No difficulty swallowing.   CV: No chest pain. No palpitations. No lightheadedness or dizziness.   Resp: No dyspnea at rest. No dyspnea on exertion. No orthopnea. No wheezing. No cough. No stridor. No sputum production. No chest pain with respiration.  GI: No nausea. No vomiting. No diarrhea.  MSK: No joint pain or pain in any extremities  Integumentary: No skin lesions. No pedal edema.  Neurological: No gross motor weakness. No sensory changes.  [+ ] All other systems negative  [ ] Unable to assess ROS because ________    OBJECTIVE:  ICU Vital Signs Last 24 Hrs  T(C): 36.5 (29 Nov 2022 04:52), Max: 37 (28 Nov 2022 12:57)  T(F): 97.7 (29 Nov 2022 04:52), Max: 98.6 (28 Nov 2022 12:57)  HR: 69 (29 Nov 2022 04:52) (69 - 87)  BP: 144/78 (29 Nov 2022 04:52) (134/58 - 144/78)  BP(mean): 100 (29 Nov 2022 04:52) (100 - 100)  ABP: --  ABP(mean): --  RR: 18 (29 Nov 2022 04:52) (18 - 18)  SpO2: 98% (29 Nov 2022 04:52) (94% - 98%)    O2 Parameters below as of 29 Nov 2022 04:52  Patient On (Oxygen Delivery Method): room air              11-27 @ 07:01  -  11-28 @ 07:00  --------------------------------------------------------  IN: 1425 mL / OUT: 1151 mL / NET: 274 mL    11-28 @ 07:01  - 11-29 @ 05:23  --------------------------------------------------------  IN: 790 mL / OUT: 1900 mL / NET: -1110 mL      CAPILLARY BLOOD GLUCOSE      POCT Blood Glucose.: 275 mg/dL (28 Nov 2022 21:36)      PHYSICAL EXAM: NAD in bed on RA  General: Awake, alert, oriented X 3.   HEENT: Atraumatic, normocephalic.                 Mallampatti Grade 3                No nasal congestion.                No tonsillar or pharyngeal exudates.  Lymph Nodes: No palpable lymphadenopathy  Neck: No JVD. No carotid bruit.   Respiratory: Normal chest expansion                         Normal percussion                         Normal and equal air entry                         No wheeze, rhonchi or rales.  Cardiovascular: S1 S2 normal. No murmurs, rubs or gallops.   Abdomen: Soft, non-tender, non-distended. No organomegaly. Normoactive bowel sounds.  Extremities: Warm to touch. Peripheral pulse palpable. No pedal edema.   Skin: No rashes or skin lesions  Neurological: Motor and sensory examination equal and normal in all four extremities.  Psychiatry: Appropriate mood and affect.    HOSPITAL MEDICATIONS:  MEDICATIONS  (STANDING):  acetylcysteine 10%  Inhalation 4 milliLiter(s) Inhalation every 12 hours  albuterol/ipratropium for Nebulization 3 milliLiter(s) Nebulizer every 6 hours  apixaban 5 milliGRAM(s) Oral every 12 hours  ascorbic acid 500 milliGRAM(s) Oral daily  atorvastatin 20 milliGRAM(s) Oral at bedtime  buDESOnide    Inhalation Suspension 0.5 milliGRAM(s) Inhalation two times a day  chlorhexidine 2% Cloths 1 Application(s) Topical daily  collagenase Ointment 1 Application(s) Topical daily  dexAMETHasone     Tablet 6 milliGRAM(s) Oral daily  fluconAZOLE   Tablet 400 milliGRAM(s) Oral daily  hydrALAZINE 50 milliGRAM(s) Oral three times a day  insulin lispro (ADMELOG) corrective regimen sliding scale   SubCutaneous three times a day before meals  insulin lispro (ADMELOG) corrective regimen sliding scale   SubCutaneous at bedtime  magnesium oxide 400 milliGRAM(s) Oral every 8 hours  metoprolol tartrate 12.5 milliGRAM(s) Oral two times a day  mexiletine 200 milliGRAM(s) Oral every 8 hours  multivitamin 1 Tablet(s) Oral daily  mupirocin 2% Ointment 1 Application(s) Topical <User Schedule>  nystatin Powder 1 Application(s) Topical two times a day  pantoprazole    Tablet 40 milliGRAM(s) Oral before breakfast  remdesivir  IVPB 100 milliGRAM(s) IV Intermittent every 24 hours  remdesivir  IVPB   IV Intermittent   silver sulfADIAZINE 1% Cream 1 Application(s) Topical two times a day  sodium chloride 0.9% lock flush 3 milliLiter(s) IV Push every 8 hours  sodium chloride 0.9%. 1000 milliLiter(s) (45 mL/Hr) IV Continuous <Continuous>  trimethoprim   80 mG/sulfamethoxazole 400 mG 1 Tablet(s) Oral daily    MEDICATIONS  (PRN):  calamine/zinc oxide Lotion 1 Application(s) Topical every 8 hours PRN Itching  ondansetron  IVPB 4 milliGRAM(s) IV Intermittent every 8 hours PRN Nausea      LABS:                        10.6   5.23  )-----------( 201      ( 28 Nov 2022 09:52 )             35.7     11-28    138  |  101  |  10  ----------------------------<  200<H>  3.9   |  25  |  0.53    Ca    8.7      28 Nov 2022 09:52    TPro  6.6  /  Alb  3.0<L>  /  TBili  0.2  /  DBili  <0.1  /  AST  24  /  ALT  15  /  AlkPhos  92  11-28    PT/INR - ( 28 Nov 2022 09:52 )   PT: 19.6 sec;   INR: 1.70 ratio                   MICROBIOLOGY:     RADIOLOGY:  [ ] Reviewed and interpreted by me    Point of Care Ultrasound Findings:    PFT:    EKG:

## 2022-11-29 NOTE — PROGRESS NOTE ADULT - PROBLEM SELECTOR PROBLEM 1
COVID-19 virus antibody detected

## 2022-11-30 ENCOUNTER — TRANSCRIPTION ENCOUNTER (OUTPATIENT)
Age: 72
End: 2022-11-30

## 2022-11-30 VITALS
HEART RATE: 75 BPM | SYSTOLIC BLOOD PRESSURE: 124 MMHG | RESPIRATION RATE: 18 BRPM | OXYGEN SATURATION: 98 % | DIASTOLIC BLOOD PRESSURE: 74 MMHG

## 2022-11-30 LAB
ALBUMIN SERPL ELPH-MCNC: 3 G/DL — LOW (ref 3.3–5)
ALP SERPL-CCNC: 83 U/L — SIGNIFICANT CHANGE UP (ref 40–120)
ALT FLD-CCNC: 12 U/L — SIGNIFICANT CHANGE UP (ref 10–45)
ANION GAP SERPL CALC-SCNC: 8 MMOL/L — SIGNIFICANT CHANGE UP (ref 5–17)
AST SERPL-CCNC: 13 U/L — SIGNIFICANT CHANGE UP (ref 10–40)
BILIRUB DIRECT SERPL-MCNC: <0.1 MG/DL — SIGNIFICANT CHANGE UP (ref 0–0.3)
BILIRUB INDIRECT FLD-MCNC: >0.1 MG/DL — LOW (ref 0.2–1)
BILIRUB SERPL-MCNC: 0.2 MG/DL — SIGNIFICANT CHANGE UP (ref 0.2–1.2)
BUN SERPL-MCNC: 12 MG/DL — SIGNIFICANT CHANGE UP (ref 7–23)
CALCIUM SERPL-MCNC: 8.6 MG/DL — SIGNIFICANT CHANGE UP (ref 8.4–10.5)
CHLORIDE SERPL-SCNC: 104 MMOL/L — SIGNIFICANT CHANGE UP (ref 96–108)
CO2 SERPL-SCNC: 25 MMOL/L — SIGNIFICANT CHANGE UP (ref 22–31)
CREAT SERPL-MCNC: 0.46 MG/DL — LOW (ref 0.5–1.3)
EGFR: 100 ML/MIN/1.73M2 — SIGNIFICANT CHANGE UP
GLUCOSE BLDC GLUCOMTR-MCNC: 142 MG/DL — HIGH (ref 70–99)
GLUCOSE BLDC GLUCOMTR-MCNC: 164 MG/DL — HIGH (ref 70–99)
GLUCOSE BLDC GLUCOMTR-MCNC: 336 MG/DL — HIGH (ref 70–99)
GLUCOSE SERPL-MCNC: 315 MG/DL — HIGH (ref 70–99)
HCT VFR BLD CALC: 33.6 % — LOW (ref 34.5–45)
HGB BLD-MCNC: 10 G/DL — LOW (ref 11.5–15.5)
INR BLD: 1.58 RATIO — HIGH (ref 0.88–1.16)
MCHC RBC-ENTMCNC: 22.1 PG — LOW (ref 27–34)
MCHC RBC-ENTMCNC: 29.8 GM/DL — LOW (ref 32–36)
MCV RBC AUTO: 74.3 FL — LOW (ref 80–100)
NRBC # BLD: 0 /100 WBCS — SIGNIFICANT CHANGE UP (ref 0–0)
PLATELET # BLD AUTO: 241 K/UL — SIGNIFICANT CHANGE UP (ref 150–400)
POTASSIUM SERPL-MCNC: 4.3 MMOL/L — SIGNIFICANT CHANGE UP (ref 3.5–5.3)
POTASSIUM SERPL-SCNC: 4.3 MMOL/L — SIGNIFICANT CHANGE UP (ref 3.5–5.3)
PROT SERPL-MCNC: 6.1 G/DL — SIGNIFICANT CHANGE UP (ref 6–8.3)
PROTHROM AB SERPL-ACNC: 18.3 SEC — HIGH (ref 10.5–13.4)
RBC # BLD: 4.52 M/UL — SIGNIFICANT CHANGE UP (ref 3.8–5.2)
RBC # FLD: 18.4 % — HIGH (ref 10.3–14.5)
SODIUM SERPL-SCNC: 137 MMOL/L — SIGNIFICANT CHANGE UP (ref 135–145)
WBC # BLD: 7.26 K/UL — SIGNIFICANT CHANGE UP (ref 3.8–10.5)
WBC # FLD AUTO: 7.26 K/UL — SIGNIFICANT CHANGE UP (ref 3.8–10.5)

## 2022-11-30 PROCEDURE — 80048 BASIC METABOLIC PNL TOTAL CA: CPT

## 2022-11-30 PROCEDURE — 86900 BLOOD TYPING SEROLOGIC ABO: CPT

## 2022-11-30 PROCEDURE — 96374 THER/PROPH/DIAG INJ IV PUSH: CPT

## 2022-11-30 PROCEDURE — 83615 LACTATE (LD) (LDH) ENZYME: CPT

## 2022-11-30 PROCEDURE — 87040 BLOOD CULTURE FOR BACTERIA: CPT

## 2022-11-30 PROCEDURE — 97161 PT EVAL LOW COMPLEX 20 MIN: CPT

## 2022-11-30 PROCEDURE — 85379 FIBRIN DEGRADATION QUANT: CPT

## 2022-11-30 PROCEDURE — 83880 ASSAY OF NATRIURETIC PEPTIDE: CPT

## 2022-11-30 PROCEDURE — 83605 ASSAY OF LACTIC ACID: CPT

## 2022-11-30 PROCEDURE — 85027 COMPLETE CBC AUTOMATED: CPT

## 2022-11-30 PROCEDURE — 71045 X-RAY EXAM CHEST 1 VIEW: CPT

## 2022-11-30 PROCEDURE — 82947 ASSAY GLUCOSE BLOOD QUANT: CPT

## 2022-11-30 PROCEDURE — 99285 EMERGENCY DEPT VISIT HI MDM: CPT

## 2022-11-30 PROCEDURE — 36415 COLL VENOUS BLD VENIPUNCTURE: CPT

## 2022-11-30 PROCEDURE — 83735 ASSAY OF MAGNESIUM: CPT

## 2022-11-30 PROCEDURE — 85610 PROTHROMBIN TIME: CPT

## 2022-11-30 PROCEDURE — 99232 SBSQ HOSP IP/OBS MODERATE 35: CPT

## 2022-11-30 PROCEDURE — 82435 ASSAY OF BLOOD CHLORIDE: CPT

## 2022-11-30 PROCEDURE — 85014 HEMATOCRIT: CPT

## 2022-11-30 PROCEDURE — 85018 HEMOGLOBIN: CPT

## 2022-11-30 PROCEDURE — 84132 ASSAY OF SERUM POTASSIUM: CPT

## 2022-11-30 PROCEDURE — 84295 ASSAY OF SERUM SODIUM: CPT

## 2022-11-30 PROCEDURE — 94664 DEMO&/EVAL PT USE INHALER: CPT

## 2022-11-30 PROCEDURE — 85730 THROMBOPLASTIN TIME PARTIAL: CPT

## 2022-11-30 PROCEDURE — 80076 HEPATIC FUNCTION PANEL: CPT

## 2022-11-30 PROCEDURE — 84484 ASSAY OF TROPONIN QUANT: CPT

## 2022-11-30 PROCEDURE — 0225U NFCT DS DNA&RNA 21 SARSCOV2: CPT

## 2022-11-30 PROCEDURE — 82803 BLOOD GASES ANY COMBINATION: CPT

## 2022-11-30 PROCEDURE — U0003: CPT

## 2022-11-30 PROCEDURE — 92610 EVALUATE SWALLOWING FUNCTION: CPT

## 2022-11-30 PROCEDURE — U0005: CPT

## 2022-11-30 PROCEDURE — 36600 WITHDRAWAL OF ARTERIAL BLOOD: CPT

## 2022-11-30 PROCEDURE — 94640 AIRWAY INHALATION TREATMENT: CPT

## 2022-11-30 PROCEDURE — 82728 ASSAY OF FERRITIN: CPT

## 2022-11-30 PROCEDURE — 74018 RADEX ABDOMEN 1 VIEW: CPT

## 2022-11-30 PROCEDURE — 80053 COMPREHEN METABOLIC PANEL: CPT

## 2022-11-30 PROCEDURE — 86901 BLOOD TYPING SEROLOGIC RH(D): CPT

## 2022-11-30 PROCEDURE — 82962 GLUCOSE BLOOD TEST: CPT

## 2022-11-30 PROCEDURE — 84100 ASSAY OF PHOSPHORUS: CPT

## 2022-11-30 PROCEDURE — 85025 COMPLETE CBC W/AUTO DIFF WBC: CPT

## 2022-11-30 PROCEDURE — 86850 RBC ANTIBODY SCREEN: CPT

## 2022-11-30 PROCEDURE — 82330 ASSAY OF CALCIUM: CPT

## 2022-11-30 PROCEDURE — 82248 BILIRUBIN DIRECT: CPT

## 2022-11-30 PROCEDURE — 82565 ASSAY OF CREATININE: CPT

## 2022-11-30 RX ORDER — IPRATROPIUM/ALBUTEROL SULFATE 18-103MCG
3 AEROSOL WITH ADAPTER (GRAM) INHALATION
Qty: 0 | Refills: 0 | DISCHARGE
Start: 2022-11-30

## 2022-11-30 RX ORDER — MUPIROCIN 20 MG/G
1 OINTMENT TOPICAL
Qty: 0 | Refills: 0 | DISCHARGE
Start: 2022-11-30

## 2022-11-30 RX ORDER — INSULIN GLARGINE 100 [IU]/ML
10 INJECTION, SOLUTION SUBCUTANEOUS AT BEDTIME
Refills: 0 | Status: DISCONTINUED | OUTPATIENT
Start: 2022-11-30 | End: 2022-11-30

## 2022-11-30 RX ORDER — DEXAMETHASONE 0.5 MG/5ML
1 ELIXIR ORAL
Qty: 0 | Refills: 0 | DISCHARGE
Start: 2022-11-30 | End: 2022-12-08

## 2022-11-30 RX ORDER — ACETYLCYSTEINE 200 MG/ML
4 VIAL (ML) MISCELLANEOUS
Qty: 0 | Refills: 0 | DISCHARGE
Start: 2022-11-30

## 2022-11-30 RX ORDER — POLYETHYLENE GLYCOL 3350 17 G/17G
17 POWDER, FOR SOLUTION ORAL
Qty: 0 | Refills: 0 | DISCHARGE
Start: 2022-11-30

## 2022-11-30 RX ORDER — COLLAGENASE CLOSTRIDIUM HIST. 250 UNIT/G
1 OINTMENT (GRAM) TOPICAL
Qty: 0 | Refills: 0 | DISCHARGE
Start: 2022-11-30

## 2022-11-30 RX ADMIN — Medication 4 MILLILITER(S): at 17:30

## 2022-11-30 RX ADMIN — Medication 4 MILLILITER(S): at 06:22

## 2022-11-30 RX ADMIN — Medication 0.5 MILLIGRAM(S): at 06:22

## 2022-11-30 RX ADMIN — Medication 1 APPLICATION(S): at 11:23

## 2022-11-30 RX ADMIN — NYSTATIN CREAM 1 APPLICATION(S): 100000 CREAM TOPICAL at 17:47

## 2022-11-30 RX ADMIN — Medication 5 UNIT(S): at 17:28

## 2022-11-30 RX ADMIN — MUPIROCIN 1 APPLICATION(S): 20 OINTMENT TOPICAL at 08:35

## 2022-11-30 RX ADMIN — Medication 3 MILLILITER(S): at 17:29

## 2022-11-30 RX ADMIN — Medication 1 TABLET(S): at 13:48

## 2022-11-30 RX ADMIN — MEXILETINE HYDROCHLORIDE 200 MILLIGRAM(S): 150 CAPSULE ORAL at 13:49

## 2022-11-30 RX ADMIN — APIXABAN 5 MILLIGRAM(S): 2.5 TABLET, FILM COATED ORAL at 17:30

## 2022-11-30 RX ADMIN — POLYETHYLENE GLYCOL 3350 17 GRAM(S): 17 POWDER, FOR SOLUTION ORAL at 17:30

## 2022-11-30 RX ADMIN — Medication 6 MILLIGRAM(S): at 06:23

## 2022-11-30 RX ADMIN — NYSTATIN CREAM 1 APPLICATION(S): 100000 CREAM TOPICAL at 08:36

## 2022-11-30 RX ADMIN — Medication 50 MILLIGRAM(S): at 13:49

## 2022-11-30 RX ADMIN — Medication 1 APPLICATION(S): at 12:43

## 2022-11-30 RX ADMIN — SODIUM CHLORIDE 3 MILLILITER(S): 9 INJECTION INTRAMUSCULAR; INTRAVENOUS; SUBCUTANEOUS at 13:47

## 2022-11-30 RX ADMIN — Medication 5 UNIT(S): at 08:34

## 2022-11-30 RX ADMIN — FLUCONAZOLE 400 MILLIGRAM(S): 150 TABLET ORAL at 13:48

## 2022-11-30 RX ADMIN — Medication 5 UNIT(S): at 12:43

## 2022-11-30 RX ADMIN — Medication 3 MILLILITER(S): at 06:21

## 2022-11-30 RX ADMIN — SODIUM CHLORIDE 3 MILLILITER(S): 9 INJECTION INTRAMUSCULAR; INTRAVENOUS; SUBCUTANEOUS at 06:14

## 2022-11-30 RX ADMIN — Medication 4: at 17:29

## 2022-11-30 RX ADMIN — MEXILETINE HYDROCHLORIDE 200 MILLIGRAM(S): 150 CAPSULE ORAL at 06:22

## 2022-11-30 RX ADMIN — MAGNESIUM OXIDE 400 MG ORAL TABLET 400 MILLIGRAM(S): 241.3 TABLET ORAL at 13:48

## 2022-11-30 RX ADMIN — POLYETHYLENE GLYCOL 3350 17 GRAM(S): 17 POWDER, FOR SOLUTION ORAL at 08:36

## 2022-11-30 RX ADMIN — PANTOPRAZOLE SODIUM 40 MILLIGRAM(S): 20 TABLET, DELAYED RELEASE ORAL at 06:23

## 2022-11-30 RX ADMIN — Medication 50 MILLIGRAM(S): at 06:23

## 2022-11-30 RX ADMIN — Medication 3 MILLILITER(S): at 12:03

## 2022-11-30 RX ADMIN — Medication 0.5 MILLIGRAM(S): at 17:30

## 2022-11-30 RX ADMIN — Medication 16: at 08:35

## 2022-11-30 RX ADMIN — CHLORHEXIDINE GLUCONATE 1 APPLICATION(S): 213 SOLUTION TOPICAL at 11:23

## 2022-11-30 RX ADMIN — MAGNESIUM OXIDE 400 MG ORAL TABLET 400 MILLIGRAM(S): 241.3 TABLET ORAL at 06:23

## 2022-11-30 RX ADMIN — APIXABAN 5 MILLIGRAM(S): 2.5 TABLET, FILM COATED ORAL at 06:23

## 2022-11-30 RX ADMIN — Medication 12.5 MILLIGRAM(S): at 06:22

## 2022-11-30 RX ADMIN — Medication 500 MILLIGRAM(S): at 13:49

## 2022-11-30 RX ADMIN — Medication 12.5 MILLIGRAM(S): at 17:30

## 2022-11-30 NOTE — DISCHARGE NOTE NURSING/CASE MANAGEMENT/SOCIAL WORK - NSDCVIVACCINE_GEN_ALL_CORE_FT
COVID-19, mRNA, LNP-S, PF, 100 mcg/ 0.5 mL dose (Moderna); 06-Dec-2021 17:12; Afshan Lew (RADHA); Modern[employer] [id number] (Exp. Date: 22-Dec-2021); IntraMuscular; Deltoid Left.; 0.25 milliLiter(s);

## 2022-11-30 NOTE — DISCHARGE NOTE NURSING/CASE MANAGEMENT/SOCIAL WORK - PATIENT PORTAL LINK FT
You can access the FollowMyHealth Patient Portal offered by [facility] by registering at the following website: [email or web address]. By joining [facility]’s FollowMyHealth portal, you will also be able to view your health information using other applications (apps) compatible with our system.

## 2022-11-30 NOTE — PROGRESS NOTE ADULT - ASSESSMENT
72 year-old-female with history of DMII, CAD, atrial fibrillation on eliquis, severe persistent asthma on chronic steroids, colon cancer s/p resection/chemo and recent extended hospitalization for type I aortic dissection s/p Bentall procedure and hemiarch replacement 9/6 with Dr. Cabrera. Patient presented with 2-day history of vomiting and lethargy. Found to be covid positive.     #severe persistent asthma  #covid   - dexamethasone 6mg-for 10 days total    - Remdesevir x5   - singulair 10- duoneb q 6    - budes .5 bid       O2 goal <90%    # TBM  -s/p tracheoplasty     acapella/VEST rx        - mucomyst here 2 cc 10% q 8 (secretions)    #s/p Aortic aneurysm repair  --as per [facility] staff care       AF-on eliquis rx               CV-improved hydralazine/lopressor/mexilitine   ******************************            SEE Below:  11/28-better but wants different rehab  11/29-D5/5 rem, D2/10 dex, DC planning  11/30-completed rem, D3/10 dex 6, await neg swab for DC

## 2022-11-30 NOTE — PROGRESS NOTE ADULT - SUBJECTIVE AND OBJECTIVE BOX
CHIEF COMPLAINT: f/up sob, chronic resp failure, TBM, severe persistent asthma, VC dysfunction, Type A aortic dissection s/p repair w/modified "Bentall procedure and hemiarch replacement 9/6- decannulated-on RA-readmit w/ covid 19 on 11/24-feels well, no cough or sob    Interval Events: completed rem/ D3/10 dex    REVIEW OF SYSTEMS:  Constitutional: No fevers or chills. No weight loss. No fatigue or generalized malaise.  Eyes: No itching or discharge from the eyes  ENT: No ear pain. No ear discharge. No nasal congestion. No post nasal drip. No epistaxis. No throat pain. No sore throat. No difficulty swallowing.   CV: No chest pain. No palpitations. No lightheadedness or dizziness.   Resp: No dyspnea at rest. No dyspnea on exertion. No orthopnea. No wheezing. No cough. No stridor. No sputum production. No chest pain with respiration.  GI: No nausea. No vomiting. No diarrhea.  MSK: No joint pain or pain in any extremities  Integumentary: No skin lesions. No pedal edema.  Neurological: No gross motor weakness. No sensory changes.  [+ ] All other systems negative  [ ] Unable to assess ROS because ________    OBJECTIVE:  ICU Vital Signs Last 24 Hrs  T(C): 36.7 (29 Nov 2022 19:46), Max: 36.9 (29 Nov 2022 13:30)  T(F): 98 (29 Nov 2022 19:46), Max: 98.4 (29 Nov 2022 13:30)  HR: 72 (29 Nov 2022 19:46) (72 - 80)  BP: 101/61 (29 Nov 2022 19:46) (101/61 - 131/71)  BP(mean): --  ABP: --  ABP(mean): --  RR: 20 (29 Nov 2022 19:46) (18 - 20)  SpO2: 92% (29 Nov 2022 19:46) (92% - 99%)    O2 Parameters below as of 29 Nov 2022 19:46  Patient On (Oxygen Delivery Method): room air              11-28 @ 07:01  -  11-29 @ 07:00  --------------------------------------------------------  IN: 1330 mL / OUT: 1900 mL / NET: -570 mL    11-29 @ 07:01  - 11-30 @ 05:08  --------------------------------------------------------  IN: 1370 mL / OUT: 375 mL / NET: 995 mL      CAPILLARY BLOOD GLUCOSE      POCT Blood Glucose.: 216 mg/dL (29 Nov 2022 21:41)      PHYSICAL EXAM: NAD in bed on RA  General: Awake, alert, oriented X 3.   HEENT: Atraumatic, normocephalic.                 Mallampatti Grade 2                No nasal congestion.                No tonsillar or pharyngeal exudates.  Lymph Nodes: No palpable lymphadenopathy  Neck: No JVD. No carotid bruit.   Respiratory: abnormal chest expansion                         Normal percussion                         Normal and equal air entry                         No wheeze, rhonchi or rales.  Cardiovascular: S1 S2 normal. No murmurs, rubs or gallops.   Abdomen: Soft, non-tender, non-distended. No organomegaly. Normoactive bowel sounds.  Extremities: Warm to touch. Peripheral pulse palpable. No pedal edema.   Skin: No rashes or skin lesions  Neurological: Motor and sensory examination equal and normal in all four extremities.  Psychiatry: Appropriate mood and affect.    HOSPITAL MEDICATIONS:  MEDICATIONS  (STANDING):  acetylcysteine 10%  Inhalation 4 milliLiter(s) Inhalation every 12 hours  albuterol/ipratropium for Nebulization 3 milliLiter(s) Nebulizer every 6 hours  apixaban 5 milliGRAM(s) Oral every 12 hours  ascorbic acid 500 milliGRAM(s) Oral daily  atorvastatin 20 milliGRAM(s) Oral at bedtime  buDESOnide    Inhalation Suspension 0.5 milliGRAM(s) Inhalation two times a day  chlorhexidine 2% Cloths 1 Application(s) Topical daily  collagenase Ointment 1 Application(s) Topical daily  dexAMETHasone     Tablet 6 milliGRAM(s) Oral daily  fluconAZOLE   Tablet 400 milliGRAM(s) Oral daily  hydrALAZINE 50 milliGRAM(s) Oral three times a day  insulin lispro (ADMELOG) corrective regimen sliding scale   SubCutaneous at bedtime  insulin lispro (ADMELOG) corrective regimen sliding scale   SubCutaneous three times a day before meals  insulin lispro (ADMELOG) corrective regimen sliding scale   SubCutaneous at bedtime  insulin lispro Injectable (ADMELOG) 5 Unit(s) SubCutaneous before breakfast  insulin lispro Injectable (ADMELOG) 5 Unit(s) SubCutaneous before lunch  insulin lispro Injectable (ADMELOG) 5 Unit(s) SubCutaneous before dinner  magnesium oxide 400 milliGRAM(s) Oral every 8 hours  metoprolol tartrate 12.5 milliGRAM(s) Oral two times a day  mexiletine 200 milliGRAM(s) Oral every 8 hours  multivitamin 1 Tablet(s) Oral daily  mupirocin 2% Ointment 1 Application(s) Topical <User Schedule>  nystatin Powder 1 Application(s) Topical two times a day  pantoprazole    Tablet 40 milliGRAM(s) Oral before breakfast  polyethylene glycol 3350 17 Gram(s) Oral two times a day  silver sulfADIAZINE 1% Cream 1 Application(s) Topical two times a day  sodium chloride 0.9% lock flush 3 milliLiter(s) IV Push every 8 hours  sodium chloride 0.9%. 1000 milliLiter(s) (45 mL/Hr) IV Continuous <Continuous>  trimethoprim   80 mG/sulfamethoxazole 400 mG 1 Tablet(s) Oral daily    MEDICATIONS  (PRN):  calamine/zinc oxide Lotion 1 Application(s) Topical every 8 hours PRN Itching  ondansetron  IVPB 4 milliGRAM(s) IV Intermittent every 8 hours PRN Nausea      LABS:                        10.6   5.23  )-----------( 201      ( 28 Nov 2022 09:52 )             35.7     11-29    134<L>  |  100  |  8   ----------------------------<  290<H>  4.7   |  20<L>  |  0.41<L>    Ca    8.8      29 Nov 2022 11:01    TPro  6.5  /  Alb  3.0<L>  /  TBili  0.2  /  DBili  <0.1  /  AST  26  /  ALT  18  /  AlkPhos  92  11-29    PT/INR - ( 28 Nov 2022 09:52 )   PT: 19.6 sec;   INR: 1.70 ratio                   MICROBIOLOGY:     RADIOLOGY:  [ ] Reviewed and interpreted by me    Point of Care Ultrasound Findings:    PFT:    EKG:

## 2022-11-30 NOTE — DISCHARGE NOTE PROVIDER - CARE PROVIDER_API CALL
Tan Cabrera)  Thoracic and Cardiac Surgery  [address] [place] [address]  Phone: [phone]  Fax: [phone]  Follow Up Time:

## 2022-11-30 NOTE — PROGRESS NOTE ADULT - PROVIDER SPECIALTY LIST ADULT
Pulmonology
CT Surgery
Infectious Disease
Pulmonology
CT Surgery
Pulmonology
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
## 2022-11-30 NOTE — DISCHARGE NOTE PROVIDER - NSDCCPCAREPLAN_GEN_ALL_CORE_FT
PRINCIPAL DISCHARGE DIAGNOSIS  Diagnosis: Lethargy  Assessment and Plan of Treatment:       SECONDARY DISCHARGE DIAGNOSES  Diagnosis: Nausea & vomiting  Assessment and Plan of Treatment:

## 2022-11-30 NOTE — DISCHARGE NOTE NURSING/CASE MANAGEMENT/SOCIAL WORK - NSDCPEFALRISK_GEN_ALL_CORE
For information on Fall & Injury Prevention, visit: [email or web address] OR  [email or web address] OR  https://www.cdc.gov/steadi/patient.html

## 2022-11-30 NOTE — DISCHARGE NOTE PROVIDER - NSDCFUSCHEDAPPT_GEN_ALL_CORE_FT
Rosa Isela Recinos  [facility]  VASCULAR [address]  Scheduled Appointment: 12/21/2022    Genesis Aragon  [facility]  ELECTROPH [id number]  Scheduled Appointment: 01/26/2023    Rico Glover  [facility]  CARDIOLOGY [id number]  Scheduled Appointment: 01/26/2023

## 2022-11-30 NOTE — PROGRESS NOTE ADULT - TIME BILLING
as above: completing remdisivir today,  DC planning after rx for covid19 pending neg swab, resp stable  multifactorial dyspnea-resp failure-severe persistent asthma, TBM s/p tracheoplasty, s/p Aortic aneurysm repair, covid19-O2-keep 90% (RA/NC)  severe persistent asthma-chronic medrol 8mg, singulair 10, duoneb q 6, budes .5 bid, tezspire 8/29-was due 9/29-next upon DC from rehab  covid19-as of 11/24-remdisivir for 5 days total, dexamethasone for D2/10 day ( her chronic medrol 8mg on hold for 10 days)  TBM-s/p tracheoplasty--accapella, vest rx, mucomyst here 2 cc 10% q 8 (secretions)  s/p Aortic aneurysm repair--as per [facility] staff care  AF-on eliquis rx               CV-contdrolled hydralazine/lopressor/mexilitine   DVT R-IJ-on oral A/C  *****ID-s/p proteus bronchitis-s/p meropenum changed to ceftriaxone and back to meropenem/vanco- off of ABX as of 9/19--restart of ABX 9/27-meropenem/vanco-s/p  meropenem s/p vanco for MRSE sepsis x 6 wks, plastics/ortho for elbow-proteus (?wash out)-vac in place-suppressive rx-bact/fluconazole as of 11/15  PT-OOB as able                             GI-aspiration precautions, PO feeds     ****ORTHO f/up-wound care f/up  **VC dysfunction--aspiration precautions-s/p trach 10/10-ENT f/up s/p laryngoscopy- decannulated-improved  swallow issues-will need new dentures    Heme onc f/up colon ca  prog-good                PT- OOB       ***DC planning to rehab    Eulalio Allison MD-Pulmonary   [phone].
as above: completed remdisivir 11/29,  DC planning after rx for covid19 pending neg swab, resp stable  multifactorial dyspnea-resp failure-severe persistent asthma, TBM s/p tracheoplasty, s/p Aortic aneurysm repair, covid19-O2-keep 90% (RA/NC)  severe persistent asthma-chronic medrol 8mg, singulair 10, duoneb q 6, budes .5 bid, tezspire 8/29-was due 9/29-next upon DC from rehab  covid19-as of 11/24-remdisivir s/p 5 days total, dexamethasone for D3/10 day ( her chronic medrol 8mg on hold for 10 days)  TBM-s/p tracheoplasty--accapella, vest rx, mucomyst here 2 cc 10% q 8 (secretions)  s/p Aortic aneurysm repair--as per [facility] staff care  AF-on eliquis rx               CV-contdrolled hydralazine/lopressor/mexilitine   DVT R-IJ-on oral A/C  *****ID-s/p proteus bronchitis-s/p meropenum changed to ceftriaxone and back to meropenem/vanco- off of ABX as of 9/19--restart of ABX 9/27-meropenem/vanco-s/p  meropenem s/p vanco for MRSE sepsis x 6 wks, plastics/ortho for elbow-proteus (?wash out)-vac in place-suppressive rx-bact/fluconazole as of 11/15  PT-OOB as able                             GI-aspiration precautions, PO feeds     ****ORTHO f/up-wound care f/up  **VC dysfunction--aspiration precautions-s/p trach 10/10-ENT f/up s/p laryngoscopy- decannulated-improved  swallow issues-will need new dentures    Heme onc f/up colon ca  prog-good                PT- OOB       ***DC planning to rehab    Eulalio Allison MD-Pulmonary   [phone].
as above:  DC planning after rx for covid19 pending neg swab, resp stable  multifactorial dyspnea-resp failure-severe persistent asthma, TBM s/p tracheoplasty, s/p Aortic aneurysm repair, covid19-O2-keep 90% (RA/NC)  severe persistent asthma-chronic medrol 8mg, singulair 10, duoneb q 6, budes .5 bid, tezspire 8/29-was due 9/29-next upon DC from rehab  covid19-as of 11/24-remdisivir for 5 days total, dexamethasone for 10 day (still on her chronic medrol 8mg)  TBM-s/p tracheoplasty--accapella, vest rx, mucomyst here 2 cc 10% q 8 (secretions)  s/p Aortic aneurysm repair--as per [facility] staff care  AF-on eliquis rx               CV-contdrolled hydralazine/lopressor/mexilitine   DVT R-IJ-on oral A/C  *****ID-s/p proteus bronchitis-s/p meropenum changed to ceftriaxone and back to meropenem/vanco- off of ABX as of 9/19--restart of ABX 9/27-meropenem/vanco-s/p  meropenem s/p vanco for MRSE sepsis x 6 wks, plastics/ortho for elbow-proteus (?wash out)-vac in place-suppressive rx-bact/fluconazole as of 11/15  PT-OOB as able                             GI-aspiration precautions, PO feeds     ****ORTHO f/up-wound care f/up  **VC dysfunction--aspiration precautions-s/p trach 10/10-ENT f/up s/p laryngoscopy- decannulated-improved  swallow issues-will need new dentures    Heme onc f/up colon ca  prog-good                PT- OOB       ***DC planning to rehab    Eulalio Allison MD-Pulmonary   [phone].
[place]

## 2022-11-30 NOTE — DISCHARGE NOTE PROVIDER - NSDCFUADDINST_GEN_ALL_CORE_FT
NO Meals  in bed   Check Blood sugar 4x day      Corrective  sliding scale   cover with admelog                     daytime     4 units   151-200                                      8 units   201-250                                      12 units 251-300   call MD                      bedtime    2 units    251-300                                      4 units    300         call MD   -Colostomy care

## 2022-11-30 NOTE — DISCHARGE NOTE PROVIDER - HOSPITAL COURSE
nystatin Powder 1 Application(s) Topical two times a day  ondansetron  IVPB 4 milliGRAM(s) IV Intermittent every 8 hours PRN  pantoprazole    Tablet 40 milliGRAM(s) Oral before breakfast  remdesivir  IVPB   IV Intermittent   remdesivir  IVPB 100 milliGRAM(s) IV Intermittent every 24 hours  silver sulfADIAZINE 1% Cream 1 Application(s) Topical two times a day  sodium chloride 0.9% lock flush 3 milliLiter(s) IV Push every 8 hours  sodium chloride 0.9%. 1000 milliLiter(s) IV Continuous <Continuous>  trimethoprim   80 mG/sulfamethoxazole 400 mG 1 Tablet(s) Oral     Assessment and Plan:   · Assessment    72 year-old-female with history of DMII, CAD, atrial fibrillation on eliquis, severe persistent asthma on chronic steroids, colon cancer s/p resection/chemo and recent extended hospitalization for type I aortic dissection s/p Bentall procedure and hemiarch replacement 9/6 with Dr. Cabrera. Patient presents today with 2-day history of vomiting and lethargy. Per daughter at bedside, patient was noted to be more lethargic today, and she lasted saw her on Monday. Patient reports that she has been feeling nauseous since Monday,    11/25 Pt covid + on admission from Rehab. ID and Pulmonary consulted for assistance in management with covid, asthma and history of tracheal malacia. Will initiate remdesivir  11/26 Supplemental O2 weaned. Pt remains afebrile O2 saturation >95% room air. ID consulted  11/27 #3/5 day remdesivir. No evidence of respiratory distress. No episodes of nausea/vomitting overnight.  11/28 VSS, afebrile,  RA SpO2 94%. Completing Day 4/5 of remdesivir. Abd Xray non obstructive gas/stool pattern.

## 2022-11-30 NOTE — DISCHARGE NOTE PROVIDER - NSDCMRMEDTOKEN_GEN_ALL_CORE_FT
acetaminophen 160 mg/5 mL oral suspension: 20.31 milliliter(s) orally every 6 hours, As needed, Temp greater or equal to 38C (100.4F), Mild Pain (1 - 3)  acetylcysteine 10% inhalation solution: 4 milliliter(s) inhaled every 12 hours  apixaban 5 mg oral tablet: 1 tab(s) orally every 12 hours  ascorbic acid 500 mg oral tablet: 1 tab(s) orally once a day  budesonide: 0.5 milligram(s) inhaled 2 times a day  collagenase 250 units/g topical ointment: 1 application topically once a day   and alleyvyn     sacrum  Crestor 5 mg oral tablet: 1 tab(s) orally once a day (at bedtime)  dexamethasone 6 mg oral tablet: 1 tab(s) orally once a day  fluconazole 200 mg oral tablet: 2 tab(s) orally once a day  hydrALAZINE 50 mg oral tablet: 1 tab(s) orally 3 times a day  insulin glargine 100 units/mL subcutaneous solution: 8 unit(s) subcutaneous once a day (at bedtime)  ipratropium-albuterol 0.5 mg-2.5 mg/3 mL inhalation solution: 3 milliliter(s) inhaled every 6 hours  Lovenox 40 mg/0.4 mL injectable solution: 1  injectable once a day  magnesium oxide 400 mg oral tablet: 1 tab(s) orally every 8 hours  Medrol 8 mg oral tablet: 8 milligram(s) orally once a day  metoprolol: 12.5 milligram(s) orally 2 times a day  mexiletine 200 mg oral capsule: 1 cap(s) orally every 8 hours  Multiple Vitamins oral tablet: 1 tab(s) orally once a day  mupirocin 2% topical ointment: Apply topically to affected area once a day      Left heel  and rt ankle   and apply Marychuy  nystatin 100,000 units/g topical powder: 1 application topically 2 times a day  pantoprazole 40 mg oral delayed release tablet: 1 tab(s) orally once a day (before a meal)  polyethylene glycol 3350 oral powder for reconstitution: 17 gram(s) orally 2 times a day, As Needed  silver sulfADIAZINE 1% topical cream: 1 application topically 2 times a day    to rt elbow   and cover with marychuy  sulfamethoxazole-trimethoprim 400 mg-80 mg oral tablet: 1 tab(s) orally once a day

## 2022-11-30 NOTE — DISCHARGE NOTE PROVIDER - DETAILS OF MALNUTRITION DIAGNOSIS/DIAGNOSES
This patient has been assessed with a concern for Malnutrition and was treated during this hospitalization for the following Nutrition diagnosis/diagnoses:     -  11/26/2022: Severe protein-calorie malnutrition

## 2022-11-30 NOTE — PROGRESS NOTE ADULT - REASON FOR ADMISSION
Nausea/ Lethargy
Nausea/ Lethargy
readmit nausea   lethargy
Nausea/ Lethargy

## 2022-12-01 ENCOUNTER — TRANSCRIPTION ENCOUNTER (OUTPATIENT)
Age: 72
End: 2022-12-01

## 2022-12-01 ENCOUNTER — NON-APPOINTMENT (OUTPATIENT)
Age: 72
End: 2022-12-01

## 2022-12-01 RX ORDER — ENOXAPARIN SODIUM 100 MG/ML
1 INJECTION SUBCUTANEOUS
Qty: 0 | Refills: 0 | DISCHARGE
Start: 2022-12-01

## 2022-12-02 ENCOUNTER — TRANSCRIPTION ENCOUNTER (OUTPATIENT)
Age: 72
End: 2022-12-02

## 2022-12-09 ENCOUNTER — TRANSCRIPTION ENCOUNTER (OUTPATIENT)
Age: 72
End: 2022-12-09

## 2022-12-14 ENCOUNTER — INPATIENT (INPATIENT)
Facility: HOSPITAL | Age: 72
LOS: 7 days | Discharge: NURSING, REHAB OR LONG-TERM CARE | DRG: 871 | End: 2022-12-22
Attending: HOSPITALIST | Admitting: INTERNAL MEDICINE
Payer: MEDICARE

## 2022-12-14 ENCOUNTER — TRANSCRIPTION ENCOUNTER (OUTPATIENT)
Age: 72
End: 2022-12-14

## 2022-12-14 VITALS
HEIGHT: 67 IN | SYSTOLIC BLOOD PRESSURE: 92 MMHG | RESPIRATION RATE: 24 BRPM | OXYGEN SATURATION: 82 % | DIASTOLIC BLOOD PRESSURE: 53 MMHG | HEART RATE: 135 BPM

## 2022-12-14 DIAGNOSIS — Z98.89 OTHER SPECIFIED POSTPROCEDURAL STATES: Chronic | ICD-10-CM

## 2022-12-14 DIAGNOSIS — K62.5 HEMORRHAGE OF ANUS AND RECTUM: Chronic | ICD-10-CM

## 2022-12-14 DIAGNOSIS — A41.9 SEPSIS, UNSPECIFIED ORGANISM: ICD-10-CM

## 2022-12-14 DIAGNOSIS — Z98.890 OTHER SPECIFIED POSTPROCEDURAL STATES: Chronic | ICD-10-CM

## 2022-12-14 DIAGNOSIS — H05.352 EXOSTOSIS OF LEFT ORBIT: Chronic | ICD-10-CM

## 2022-12-14 DIAGNOSIS — Z87.09 PERSONAL HISTORY OF OTHER DISEASES OF THE RESPIRATORY SYSTEM: Chronic | ICD-10-CM

## 2022-12-14 DIAGNOSIS — Z96.653 PRESENCE OF ARTIFICIAL KNEE JOINT, BILATERAL: Chronic | ICD-10-CM

## 2022-12-14 LAB
ALBUMIN SERPL ELPH-MCNC: 2.8 G/DL — LOW (ref 3.3–5)
ALP SERPL-CCNC: 99 U/L — SIGNIFICANT CHANGE UP (ref 40–120)
ALT FLD-CCNC: 20 U/L — SIGNIFICANT CHANGE UP (ref 12–78)
ANION GAP SERPL CALC-SCNC: 10 MMOL/L — SIGNIFICANT CHANGE UP (ref 5–17)
APTT BLD: 31 SEC — SIGNIFICANT CHANGE UP (ref 27.5–35.5)
AST SERPL-CCNC: 20 U/L — SIGNIFICANT CHANGE UP (ref 15–37)
BASE EXCESS BLDA CALC-SCNC: -3.3 MMOL/L — LOW (ref -2–3)
BASOPHILS # BLD AUTO: 0.03 K/UL — SIGNIFICANT CHANGE UP (ref 0–0.2)
BASOPHILS NFR BLD AUTO: 0.1 % — SIGNIFICANT CHANGE UP (ref 0–2)
BILIRUB SERPL-MCNC: 0.4 MG/DL — SIGNIFICANT CHANGE UP (ref 0.2–1.2)
BLOOD GAS COMMENTS ARTERIAL: SIGNIFICANT CHANGE UP
BUN SERPL-MCNC: 21 MG/DL — SIGNIFICANT CHANGE UP (ref 7–23)
CALCIUM SERPL-MCNC: 9.5 MG/DL — SIGNIFICANT CHANGE UP (ref 8.5–10.1)
CHLORIDE SERPL-SCNC: 104 MMOL/L — SIGNIFICANT CHANGE UP (ref 96–108)
CO2 BLDA-SCNC: 21 MMOL/L — SIGNIFICANT CHANGE UP (ref 19–24)
CO2 SERPL-SCNC: 22 MMOL/L — SIGNIFICANT CHANGE UP (ref 22–31)
CREAT SERPL-MCNC: 1.16 MG/DL — SIGNIFICANT CHANGE UP (ref 0.5–1.3)
EGFR: 49 ML/MIN/1.73M2 — LOW
EOSINOPHIL # BLD AUTO: 0.05 K/UL — SIGNIFICANT CHANGE UP (ref 0–0.5)
EOSINOPHIL NFR BLD AUTO: 0.2 % — SIGNIFICANT CHANGE UP (ref 0–6)
GLUCOSE SERPL-MCNC: 213 MG/DL — HIGH (ref 70–99)
HCO3 BLDA-SCNC: 20 MMOL/L — LOW (ref 21–28)
HCT VFR BLD CALC: 46 % — HIGH (ref 34.5–45)
HGB BLD-MCNC: 14.1 G/DL — SIGNIFICANT CHANGE UP (ref 11.5–15.5)
IMM GRANULOCYTES NFR BLD AUTO: 0.8 % — SIGNIFICANT CHANGE UP (ref 0–0.9)
INR BLD: 1.33 RATIO — HIGH (ref 0.88–1.16)
LYMPHOCYTES # BLD AUTO: 15 % — SIGNIFICANT CHANGE UP (ref 13–44)
LYMPHOCYTES # BLD AUTO: 3.37 K/UL — HIGH (ref 1–3.3)
MCHC RBC-ENTMCNC: 22.4 PG — LOW (ref 27–34)
MCHC RBC-ENTMCNC: 30.7 GM/DL — LOW (ref 32–36)
MCV RBC AUTO: 73.1 FL — LOW (ref 80–100)
MONOCYTES # BLD AUTO: 0.84 K/UL — SIGNIFICANT CHANGE UP (ref 0–0.9)
MONOCYTES NFR BLD AUTO: 3.7 % — SIGNIFICANT CHANGE UP (ref 2–14)
NEUTROPHILS # BLD AUTO: 17.95 K/UL — HIGH (ref 1.8–7.4)
NEUTROPHILS NFR BLD AUTO: 80.2 % — HIGH (ref 43–77)
NT-PROBNP SERPL-SCNC: 842 PG/ML — HIGH (ref 0–125)
PCO2 BLDA: 30 MMHG — LOW (ref 32–35)
PH BLDA: 7.43 — SIGNIFICANT CHANGE UP (ref 7.35–7.45)
PLATELET # BLD AUTO: 203 K/UL — SIGNIFICANT CHANGE UP (ref 150–400)
PO2 BLDA: 65 MMHG — LOW (ref 83–108)
POTASSIUM SERPL-MCNC: 4 MMOL/L — SIGNIFICANT CHANGE UP (ref 3.5–5.3)
POTASSIUM SERPL-SCNC: 4 MMOL/L — SIGNIFICANT CHANGE UP (ref 3.5–5.3)
PROT SERPL-MCNC: 7 GM/DL — SIGNIFICANT CHANGE UP (ref 6–8.3)
PROTHROM AB SERPL-ACNC: 15.5 SEC — HIGH (ref 10.5–13.4)
RAPID RVP RESULT: DETECTED
RBC # BLD: 6.29 M/UL — HIGH (ref 3.8–5.2)
RBC # FLD: 19.3 % — HIGH (ref 10.3–14.5)
SAO2 % BLDA: 93 % — SIGNIFICANT CHANGE UP
SARS-COV-2 RNA SPEC QL NAA+PROBE: DETECTED
SODIUM SERPL-SCNC: 136 MMOL/L — SIGNIFICANT CHANGE UP (ref 135–145)
TROPONIN I, HIGH SENSITIVITY RESULT: 44.18 NG/L — SIGNIFICANT CHANGE UP
WBC # BLD: 22.41 K/UL — HIGH (ref 3.8–10.5)
WBC # FLD AUTO: 22.41 K/UL — HIGH (ref 3.8–10.5)

## 2022-12-14 PROCEDURE — 36415 COLL VENOUS BLD VENIPUNCTURE: CPT

## 2022-12-14 PROCEDURE — 71250 CT THORAX DX C-: CPT | Mod: 26

## 2022-12-14 PROCEDURE — 97116 GAIT TRAINING THERAPY: CPT | Mod: GP

## 2022-12-14 PROCEDURE — 73630 X-RAY EXAM OF FOOT: CPT | Mod: 26,50

## 2022-12-14 PROCEDURE — 93010 ELECTROCARDIOGRAM REPORT: CPT

## 2022-12-14 PROCEDURE — 93306 TTE W/DOPPLER COMPLETE: CPT | Mod: 26

## 2022-12-14 PROCEDURE — 71045 X-RAY EXAM CHEST 1 VIEW: CPT | Mod: 26

## 2022-12-14 PROCEDURE — 73630 X-RAY EXAM OF FOOT: CPT | Mod: 50

## 2022-12-14 PROCEDURE — 83605 ASSAY OF LACTIC ACID: CPT

## 2022-12-14 PROCEDURE — 82962 GLUCOSE BLOOD TEST: CPT

## 2022-12-14 PROCEDURE — 71250 CT THORAX DX C-: CPT

## 2022-12-14 PROCEDURE — 97162 PT EVAL MOD COMPLEX 30 MIN: CPT | Mod: GP

## 2022-12-14 PROCEDURE — 80048 BASIC METABOLIC PNL TOTAL CA: CPT

## 2022-12-14 PROCEDURE — 97530 THERAPEUTIC ACTIVITIES: CPT | Mod: GP

## 2022-12-14 PROCEDURE — 85652 RBC SED RATE AUTOMATED: CPT

## 2022-12-14 PROCEDURE — 84145 PROCALCITONIN (PCT): CPT

## 2022-12-14 PROCEDURE — 93306 TTE W/DOPPLER COMPLETE: CPT

## 2022-12-14 PROCEDURE — 99291 CRITICAL CARE FIRST HOUR: CPT

## 2022-12-14 PROCEDURE — 84443 ASSAY THYROID STIM HORMONE: CPT

## 2022-12-14 PROCEDURE — 86140 C-REACTIVE PROTEIN: CPT

## 2022-12-14 PROCEDURE — 85027 COMPLETE CBC AUTOMATED: CPT

## 2022-12-14 PROCEDURE — 85025 COMPLETE CBC W/AUTO DIFF WBC: CPT

## 2022-12-14 PROCEDURE — 80053 COMPREHEN METABOLIC PANEL: CPT

## 2022-12-14 PROCEDURE — 83735 ASSAY OF MAGNESIUM: CPT

## 2022-12-14 PROCEDURE — 83036 HEMOGLOBIN GLYCOSYLATED A1C: CPT

## 2022-12-14 PROCEDURE — 94640 AIRWAY INHALATION TREATMENT: CPT

## 2022-12-14 PROCEDURE — 84100 ASSAY OF PHOSPHORUS: CPT

## 2022-12-14 RX ORDER — CHLORHEXIDINE GLUCONATE 213 G/1000ML
1 SOLUTION TOPICAL
Refills: 0 | Status: DISCONTINUED | OUTPATIENT
Start: 2022-12-14 | End: 2022-12-22

## 2022-12-14 RX ORDER — INSULIN LISPRO 100/ML
5 VIAL (ML) SUBCUTANEOUS
Refills: 0 | Status: DISCONTINUED | OUTPATIENT
Start: 2022-12-14 | End: 2022-12-22

## 2022-12-14 RX ORDER — VANCOMYCIN HCL 1 G
1000 VIAL (EA) INTRAVENOUS ONCE
Refills: 0 | Status: COMPLETED | OUTPATIENT
Start: 2022-12-14 | End: 2022-12-14

## 2022-12-14 RX ORDER — DEXTROSE 50 % IN WATER 50 %
15 SYRINGE (ML) INTRAVENOUS ONCE
Refills: 0 | Status: DISCONTINUED | OUTPATIENT
Start: 2022-12-14 | End: 2022-12-22

## 2022-12-14 RX ORDER — DEXTROSE 50 % IN WATER 50 %
25 SYRINGE (ML) INTRAVENOUS ONCE
Refills: 0 | Status: DISCONTINUED | OUTPATIENT
Start: 2022-12-14 | End: 2022-12-22

## 2022-12-14 RX ORDER — APIXABAN 2.5 MG/1
5 TABLET, FILM COATED ORAL
Refills: 0 | Status: DISCONTINUED | OUTPATIENT
Start: 2022-12-14 | End: 2022-12-22

## 2022-12-14 RX ORDER — PANTOPRAZOLE SODIUM 20 MG/1
40 TABLET, DELAYED RELEASE ORAL
Refills: 0 | Status: DISCONTINUED | OUTPATIENT
Start: 2022-12-14 | End: 2022-12-22

## 2022-12-14 RX ORDER — HYDROCORTISONE 20 MG
100 TABLET ORAL EVERY 8 HOURS
Refills: 0 | Status: DISCONTINUED | OUTPATIENT
Start: 2022-12-14 | End: 2022-12-17

## 2022-12-14 RX ORDER — INSULIN HUMAN 100 [IU]/ML
5 INJECTION, SOLUTION SUBCUTANEOUS
Refills: 0 | Status: DISCONTINUED | OUTPATIENT
Start: 2022-12-14 | End: 2022-12-14

## 2022-12-14 RX ORDER — AZTREONAM 2 G
1000 VIAL (EA) INJECTION ONCE
Refills: 0 | Status: COMPLETED | OUTPATIENT
Start: 2022-12-14 | End: 2022-12-14

## 2022-12-14 RX ORDER — SODIUM CHLORIDE 9 MG/ML
1000 INJECTION INTRAMUSCULAR; INTRAVENOUS; SUBCUTANEOUS ONCE
Refills: 0 | Status: COMPLETED | OUTPATIENT
Start: 2022-12-14 | End: 2022-12-14

## 2022-12-14 RX ORDER — FLUCONAZOLE 150 MG/1
400 TABLET ORAL ONCE
Refills: 0 | Status: COMPLETED | OUTPATIENT
Start: 2022-12-14 | End: 2022-12-14

## 2022-12-14 RX ORDER — AZTREONAM 2 G
1000 VIAL (EA) INJECTION EVERY 8 HOURS
Refills: 0 | Status: DISCONTINUED | OUTPATIENT
Start: 2022-12-14 | End: 2022-12-15

## 2022-12-14 RX ORDER — ONDANSETRON 8 MG/1
4 TABLET, FILM COATED ORAL ONCE
Refills: 0 | Status: COMPLETED | OUTPATIENT
Start: 2022-12-14 | End: 2022-12-14

## 2022-12-14 RX ORDER — FLUCONAZOLE 150 MG/1
TABLET ORAL
Refills: 0 | Status: DISCONTINUED | OUTPATIENT
Start: 2022-12-14 | End: 2022-12-21

## 2022-12-14 RX ORDER — METOPROLOL TARTRATE 50 MG
12.5 TABLET ORAL
Refills: 0 | Status: DISCONTINUED | OUTPATIENT
Start: 2022-12-14 | End: 2022-12-22

## 2022-12-14 RX ORDER — GLUCAGON INJECTION, SOLUTION 0.5 MG/.1ML
1 INJECTION, SOLUTION SUBCUTANEOUS ONCE
Refills: 0 | Status: DISCONTINUED | OUTPATIENT
Start: 2022-12-14 | End: 2022-12-22

## 2022-12-14 RX ORDER — MEXILETINE HYDROCHLORIDE 150 MG/1
200 CAPSULE ORAL EVERY 8 HOURS
Refills: 0 | Status: DISCONTINUED | OUTPATIENT
Start: 2022-12-14 | End: 2022-12-22

## 2022-12-14 RX ORDER — AZTREONAM 2 G
VIAL (EA) INJECTION
Refills: 0 | Status: DISCONTINUED | OUTPATIENT
Start: 2022-12-14 | End: 2022-12-15

## 2022-12-14 RX ORDER — SODIUM CHLORIDE 9 MG/ML
1900 INJECTION, SOLUTION INTRAVENOUS ONCE
Refills: 0 | Status: COMPLETED | OUTPATIENT
Start: 2022-12-14 | End: 2022-12-14

## 2022-12-14 RX ORDER — DEXTROSE 50 % IN WATER 50 %
12.5 SYRINGE (ML) INTRAVENOUS ONCE
Refills: 0 | Status: DISCONTINUED | OUTPATIENT
Start: 2022-12-14 | End: 2022-12-22

## 2022-12-14 RX ORDER — INSULIN GLARGINE 100 [IU]/ML
18 INJECTION, SOLUTION SUBCUTANEOUS AT BEDTIME
Refills: 0 | Status: DISCONTINUED | OUTPATIENT
Start: 2022-12-14 | End: 2022-12-22

## 2022-12-14 RX ORDER — FLUCONAZOLE 150 MG/1
400 TABLET ORAL EVERY 24 HOURS
Refills: 0 | Status: DISCONTINUED | OUTPATIENT
Start: 2022-12-15 | End: 2022-12-21

## 2022-12-14 RX ORDER — IPRATROPIUM/ALBUTEROL SULFATE 18-103MCG
3 AEROSOL WITH ADAPTER (GRAM) INHALATION
Refills: 0 | Status: DISCONTINUED | OUTPATIENT
Start: 2022-12-14 | End: 2022-12-22

## 2022-12-14 RX ORDER — INSULIN LISPRO 100/ML
VIAL (ML) SUBCUTANEOUS
Refills: 0 | Status: DISCONTINUED | OUTPATIENT
Start: 2022-12-14 | End: 2022-12-22

## 2022-12-14 RX ADMIN — ONDANSETRON 4 MILLIGRAM(S): 8 TABLET, FILM COATED ORAL at 12:25

## 2022-12-14 RX ADMIN — Medication 6: at 16:26

## 2022-12-14 RX ADMIN — Medication 100 MILLIGRAM(S): at 21:46

## 2022-12-14 RX ADMIN — ONDANSETRON 4 MILLIGRAM(S): 8 TABLET, FILM COATED ORAL at 15:38

## 2022-12-14 RX ADMIN — Medication 1 TABLET(S): at 21:47

## 2022-12-14 RX ADMIN — SODIUM CHLORIDE 1900 MILLILITER(S): 9 INJECTION, SOLUTION INTRAVENOUS at 15:41

## 2022-12-14 RX ADMIN — Medication 12.5 MILLIGRAM(S): at 21:46

## 2022-12-14 RX ADMIN — MEXILETINE HYDROCHLORIDE 200 MILLIGRAM(S): 150 CAPSULE ORAL at 22:15

## 2022-12-14 RX ADMIN — FLUCONAZOLE 100 MILLIGRAM(S): 150 TABLET ORAL at 16:26

## 2022-12-14 RX ADMIN — Medication 250 MILLIGRAM(S): at 12:26

## 2022-12-14 RX ADMIN — SODIUM CHLORIDE 2000 MILLILITER(S): 9 INJECTION INTRAMUSCULAR; INTRAVENOUS; SUBCUTANEOUS at 14:27

## 2022-12-14 RX ADMIN — Medication 50 MILLIGRAM(S): at 14:14

## 2022-12-14 RX ADMIN — CHLORHEXIDINE GLUCONATE 1 APPLICATION(S): 213 SOLUTION TOPICAL at 15:40

## 2022-12-14 RX ADMIN — APIXABAN 5 MILLIGRAM(S): 2.5 TABLET, FILM COATED ORAL at 21:46

## 2022-12-14 RX ADMIN — Medication 125 MILLIGRAM(S): at 10:59

## 2022-12-14 RX ADMIN — Medication 3 MILLILITER(S): at 11:07

## 2022-12-14 RX ADMIN — Medication 100 MILLIGRAM(S): at 15:38

## 2022-12-14 RX ADMIN — INSULIN GLARGINE 18 UNIT(S): 100 INJECTION, SOLUTION SUBCUTANEOUS at 22:46

## 2022-12-14 RX ADMIN — Medication 50 MILLIGRAM(S): at 21:37

## 2022-12-14 NOTE — PATIENT PROFILE ADULT - FUNCTIONAL ASSESSMENT - BASIC MOBILITY SECTION LABEL
"ED Provider Note    CHIEF COMPLAINT  Chief Complaint   Patient presents with   • Epigastric Pain     x 1 hour \"like heartburn but worse\"    • Back Pain     x2 hours \"like I need to crack my back\"        HPI  Sruthi Jenkins is a 42 y.o. female who presents with back pain that started while at work today.  When she got home she ate and developed epigastric pain though the back pain subsided.  She states that the epigastric pain was rather severe.  It lasted for about an hour.  No vomiting or fevers.  No dysuria or hematuria.  No prior history of similar symptoms in the past.  No injury to her back or chest.  No numbness or weakness.  No prior cardiovascular disease history.    The patient notes that she has a prior history of anemia.  She was supposed to be on birth control pills and iron supplements so she took herself off over the past several months.  Denies any active bleeding.  No vaginal bleeding.  She notes that she has exertional dyspnea at times and feels rather fatigued.  She uses naproxen on occasion for aches and pains possibly a few times per month.  None recently.    REVIEW OF SYSTEMS  See HPI for further details. All other systems are negative.     PAST MEDICAL HISTORY       SOCIAL HISTORY  Social History     Tobacco Use   • Smoking status: Current Every Day Smoker     Packs/day: 0.50   • Smokeless tobacco: Never Used   Vaping Use   • Vaping Use: Never used   Substance and Sexual Activity   • Alcohol use: No   • Drug use: No   • Sexual activity: Not on file       SURGICAL HISTORY  patient denies any surgical history    CURRENT MEDICATIONS  Home Medications     Reviewed by Gay Adan R.N. (Registered Nurse) on 01/20/22 at 0131  Med List Status: Not Addressed   Medication Last Dose Status   ascorbic acid (VITAMIN C) 500 MG tablet  Active   ferrous sulfate (FERROUSUL) 325 (65 Fe) MG tablet  Active   medroxyPROGESTERone (PROVERA) 10 MG Tab  Active   multivitamin (THERAGRAN) Tab  Active          " "      ALLERGIES  No Known Allergies    PHYSICAL EXAM  VITAL SIGNS: /73   Pulse 93   Temp 36.4 °C (97.6 °F) (Oral)   Resp 16   Ht 1.626 m (5' 4\")   Wt 104 kg (230 lb)   SpO2 98%   BMI 39.48 kg/m²   Pulse ox interpretation: I interpret this pulse ox as normal.  Constitutional: Alert in no apparent distress.  HENT: No signs of trauma, Bilateral external ears normal, Nose normal.   Eyes: Conjunctiva normal, Non-icteric.   Neck: Normal range of motion, Supple, No stridor.   Cardiovascular: Regular rate and rhythm.   Thorax & Lungs: Normal breath sounds, No respiratory distress, No wheezing, No chest tenderness.   Abdomen: Bowel sounds normal, Soft, epigastric tenderness, No masses, No pulsatile masses. No peritoneal signs.  Skin: Warm, Dry, No erythema, No rash.   Back: No bony tenderness, No CVA tenderness.   Extremities: Intact distal pulses, No edema, No cyanosis  Musculoskeletal: Good range of motion in all major joints. No major deformities noted.   Neurologic: Alert, No focal deficits noted.       DIAGNOSTIC STUDIES / PROCEDURES    EKG - Physician interpretation  Results for orders placed or performed during the hospital encounter of 22   EKG (NOW)   Result Value Ref Range    Report       [facility] Emergency Dept.    Test Date:  2022  Pt Name:    HEMA GARNER               Department: ER  MRN:        [id number]                      Room:  Gender:     Female                       Technician: [id number]  :        [id number]                   Requested By:ER TRIAGE PROTOCOL  Order #:    [id number]                    Reading MD: ADEOLA BYRNE MD    Measurements  Intervals                                Axis  Rate:       71                           P:          35  MO:         164                          QRS:        28  QRSD:       78                           T:          27  QT:         416  QTc:        453    Interpretive Statements  SINUS RHYTHM  Compared to ECG 2018 " 23:57:19  No significant changes  Electronically Signed On 1- 7:20:20 PST by ADEOLA BYRNE MD         LABS  Labs Reviewed   CBC WITH DIFFERENTIAL - Abnormal; Notable for the following components:       Result Value    Hemoglobin 6.7 (*)     Hematocrit 27.5 (*)     MCV 56.9 (*)     MCH 13.9 (*)     MCHC 24.4 (*)     Neutrophils-Polys 76.10 (*)     Lymphocytes 18.60 (*)     Neutrophils (Absolute) 7.91 (*)     Hypochromia 3+ (*)     Anisocytosis 3+ (*)     Microcytosis 3+ (*)     All other components within normal limits   COMP METABOLIC PANEL - Abnormal; Notable for the following components:    Potassium 3.3 (*)     Glucose 155 (*)     All other components within normal limits   URINALYSIS,CULTURE IF INDICATED - Abnormal; Notable for the following components:    Leukocyte Esterase Small (*)     All other components within normal limits    Narrative:     Indication for culture:->Patient WITHOUT an indwelling Cross  catheter in place with new onset of Dysuria, Frequency,  Urgency, and/or Suprapubic pain   FERRITIN - Abnormal; Notable for the following components:    Ferritin 1.7 (*)     All other components within normal limits   IRON/TOTAL IRON BIND - Abnormal; Notable for the following components:    Iron 12 (*)     Unsat Iron Binding 414 (*)     % Saturation 3 (*)     All other components within normal limits   URINE MICROSCOPIC (W/UA) - Abnormal; Notable for the following components:    WBC 10-20 (*)     Bacteria Few (*)     All other components within normal limits    Narrative:     Indication for culture:->Patient WITHOUT an indwelling Cross  catheter in place with new onset of Dysuria, Frequency,  Urgency, and/or Suprapubic pain   LIPASE   HCG QUAL SERUM   ESTIMATED GFR   DIFFERENTIAL MANUAL   PERIPHERAL SMEAR REVIEW   PLATELET ESTIMATE   MORPHOLOGY   COD (ADULT)   URINE CULTURE(NEW)    Narrative:     Indication for culture:->Patient WITHOUT an indwelling Cross  catheter in place with new onset of Dysuria,  Frequency,  Urgency, and/or Suprapubic pain   TROPONIN   RELEASE RED BLOOD CELLS   TRANSFUSE RED BLOOD CELLS-NURSING COMMUNICATION (UNITS)         RADIOLOGY  DX-CHEST-PORTABLE (1 VIEW)   Final Result      No acute cardiopulmonary abnormality.      US-RUQ   Final Result      Hepatic steatosis.            COURSE & MEDICAL DECISION MAKING    Medications - No data to display    Pertinent Labs & Imaging studies reviewed. (See chart for details)  42 y.o. female presenting with back pain that then became epigastric pain.  No vomiting or fevers.  No abdominal surgeries in the past.  Currently does not have any back pain or chest pain though notes some epigastric discomfort still however it has improved.  Vital signs normal upon my bedside evaluation.  The patient is in no distress.    Laboratory studies were performed and she was found to be rather anemic with microcytic anemia and iron deficiency.  Further history was obtained and it appears that she has had blood transfusions in the past.  She was given oral contraceptive medications to help regulate her menstrual cycles and was told to take iron supplements though has not been taking these medications recently.  Denies any recent active bleeding such as vaginal bleeding or blood in her stool or hematuria or bleeding from the mouth.  She has been rather anemic in the past as well.  I suspect that the patient's anemia is related to iron deficiency given microcytic anemia and low iron levels.  Again, no signs or symptoms of active hemorrhage.    Patient does note that she has had some exertional dyspnea recently.  Likely related to anemia.    With regards to the patient's back pain, unclear as to the etiology at this time.  No evidence of pancreatitis.  Low suspicion for aortic dissection.  Has equal pulses bilaterally.  Normal mediastinum on chest x-ray.  Normal blood pressure.  Patient is in no distress currently.  She has mild epigastric pain.  Ultrasound was performed  "and there is no evidence of biliary process such as cholelithiasis.  No significant metabolic abnormalities.  Normal liver enzymes.    Epigastric pain does not appear to be an ACS equivalent.  EKG and troponin are unremarkable.  Patient has a heart score of 1.  No shortness of breath symptoms.    Upon reevaluation, the patient reports feeling fine.  No residual pain symptoms.  I did discuss transfusion  given symptomatic anemia symptoms.  She is agreeable to blood transfusion.  She was transfused with 1 unit PRBCs.    With regards of the patient's epigastric pain, this may be related to gastritis or peptic ulcer disease.  Recommending PPI treatment and follow-up with outpatient provider for further management.  No history or symptoms suggestive of an upper GI bleed at this time.  Recommending that she stay away from NSAIDs.  She was also discharged home with a refill for her iron supplements.    I have explained to the patient the risks and benefits of transfusion of blood products.  This includes, as appropriate, the risk of mild allergic reaction, hemolytic reaction, transfusion-associated lung injury, febrile reactions, circulatory or iron overload, and infection.    We discussed possible alternatives and their risks, including directed donation, autologous transfusion, and no transfusion, including IV or oral iron supplementation, as appropriate.  I believe the patient understands the risks and benefits and was able to express understanding.    The patient was instructed to follow-up with primary care physician for further management.  To return immediately for any worsening symptoms or development of any other concerning signs or symptoms. The patient verbalizes understanding in their own words.    /82   Pulse 77   Temp 36 °C (96.8 °F) (Temporal)   Resp 16   Ht 1.626 m (5' 4\")   Wt 104 kg (230 lb)   SpO2 95%   BMI 39.48 kg/m²     The patient was referred to primary care where they will receive " further BP management.      Baldomero Lynch M.D.  [facility] [address] [place] [id number] [phone]    Schedule an appointment as soon as possible for a visit       [facility], Emergency Dept  [address] [facility] [place] [address] [phone]    As needed, If symptoms worsen      FINAL IMPRESSION  1. Epigastric pain    2. Iron deficiency anemia, unspecified iron deficiency anemia type            Electronically signed by: Ford Bal M.D., 1/20/2022 3:56 AM     .

## 2022-12-14 NOTE — ED ADULT TRIAGE NOTE - CHIEF COMPLAINT QUOTE
Patient BIBA from [facility] rehab with SOB. Patient has history of asthma and a recent MI which is why she is in rehab. oxygen saturation 82% on 15L. Rspiratory called, Dr Ewing notified.

## 2022-12-14 NOTE — ED ADULT NURSE NOTE - NSICDXPASTSURGICALHX_GEN_ALL_CORE_FT
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    H/O pelvic surgery 5 years ago - s/p fracture    H/O total knee replacement, bilateral 5 years ago    History of partial hysterectomy 30 years ago - fibroids    History of sinus surgery multiple sinus surgeries    History of tracheomalacia 2015 - attempted tracheal stenting ([facility])- course complicated by obstruction, respiratory failure, multiple CPR attempts -  stent discontinued; 10/20/2016 Tracheobronchoplasty (Prolene Mesh) performed at [facility] by Dr Zapien    Rectal bleeding exam under anesthesia ([facility]) 2/2018    S/P bronchoscopy 6/5/2018 - [facility] (Dr Zapien) no evidence of tracheobronchomalacia in trachea or bronchial tubes

## 2022-12-14 NOTE — ED ADULT NURSE NOTE - CHIEF COMPLAINT QUOTE
Patient BIBA from [facility] rehab with SOB. Patient has history of asthma and a recent MI which is why she is in rehab. oxygen saturation 82% on 15L. Rspiratory called, Dr Ewing notified.

## 2022-12-14 NOTE — H&P ADULT - NSHPPHYSICALEXAM_GEN_ALL_CORE
O:    Elderly F lying in bed  + bipap in place 10/5/.60  No JVD s/p trach, ostomy closed s/p decannulation  S1S2+ HR ~ 120's, irregular  Coarse BS B/L  Abd soft NTND  + ostomy noted  1+ edema B/L LE  Awake  + DTI sacrum, R elbow, B/L heels and feet wounds  Skin otherwise pink and warm

## 2022-12-14 NOTE — CONSULT NOTE ADULT - ASSESSMENT
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 Will f/u on results.   Applied allyvan pad to BLE  WC: SSD with DSD  Continue with IV antibiotics As Per ID/Med  Left heel wound small, superficial in nature, no PTB, no purulence, no fluctuance, stable. Hemorraghic bullae to medial R midfoot and ankle are small and stable without active discharge.  Recommend offloading of bilateral Heels while bedbound with Z flex heel boots  Patient tolerated interventions well without any complications.   Podiatry will follow while in house.  Case D/W Dr. Coleman

## 2022-12-14 NOTE — H&P ADULT - NSHPLABSRESULTS_GEN_ALL_CORE
LABS:    CBC Full  -  ( 14 Dec 2022 11:19 )  WBC Count : 22.41 K/uL  RBC Count : 6.29 M/uL  Hemoglobin : 14.1 g/dL  Hematocrit : 46.0 %  Platelet Count - Automated : 203 K/uL  Mean Cell Volume : 73.1 fl  Mean Cell Hemoglobin : 22.4 pg  Mean Cell Hemoglobin Concentration : 30.7 gm/dL  Auto Neutrophil # : 17.95 K/uL  Auto Lymphocyte # : 3.37 K/uL  Auto Monocyte # : 0.84 K/uL  Auto Eosinophil # : 0.05 K/uL  Auto Basophil # : 0.03 K/uL  Auto Neutrophil % : 80.2 %  Auto Lymphocyte % : 15.0 %  Auto Monocyte % : 3.7 %  Auto Eosinophil % : 0.2 %  Auto Basophil % : 0.1 %    12-14    136  |  104  |  21  ----------------------------<  213<H>  4.0   |  22  |  1.16    Ca    9.5      14 Dec 2022 11:19    TPro  7.0  /  Alb  2.8<L>  /  TBili  0.4  /  DBili  x   /  AST  20  /  ALT  20  /  AlkPhos  99  12-14    PT/INR - ( 14 Dec 2022 11:19 )   PT: 15.5 sec;   INR: 1.33 ratio         PTT - ( 14 Dec 2022 11:19 )  PTT:31.0 sec    CAPILLARY BLOOD GLUCOSE            LIVER FUNCTIONS - ( 14 Dec 2022 11:19 )  Alb: 2.8 g/dL / Pro: 7.0 gm/dL / ALK PHOS: 99 U/L / ALT: 20 U/L / AST: 20 U/L / GGT: x

## 2022-12-14 NOTE — ED PROVIDER NOTE - NSICDXPASTSURGICALHX_GEN_ALL_CORE_FT
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    H/O pelvic surgery 5 years ago - s/p fracture    H/O total knee replacement, bilateral 5 years ago    History of partial hysterectomy 30 years ago - fibroids    History of sinus surgery multiple sinus surgeries    History of tracheomalacia 2015 - attempted tracheal stenting ([facility])- course complicated by obstruction, respiratory failure, multiple CPR attempts -  stent discontinued; 10/20/2016 Tracheobronchoplasty (Prolene Mesh) performed at [facility] by Dr Zapien    Rectal bleeding exam under anesthesia ([facility]) 2/2018    S/P bronchoscopy 6/5/2018 - [place] (Dr Zapien) no evidence of tracheobronchomalacia in trachea or bronchial tubes

## 2022-12-14 NOTE — H&P ADULT - ASSESSMENT
A:    72F  HD # 1  FULL CODE    Here for:    1. Acute hypoxic resp failure 2/2  2. PNA  3. Hyperglycemia  4. Severe sepsis 2/2 above  5. Tracheal malacia    This patient requires critical care for support of one or more vital organ systems with a high probability of imminent or life threatening deterioration in his/her condition    P:    Severe sepsis, suspect 2/2 PNA  + COVID; previous active infxn ~ 1 month ago; do NOT think this is COVID PNA, suspect bacterial  Resp failure requiring NIV    Complex medical Hx    PRN analgesia, avoid deleriogenic meds.  HD monitoring, PRN vasopressors to maintain MAP > 65;TTE done last admit, EF largely preserved, s/p mechanical valve  On bipap, 10/5/.60; attempt to change to HFNC once in CCU, suspect 2/2 PNA  CT chest to better elucidate underlying lung pathology  Broad spectrum abx, made difficult by copious abx allergies; aztreonam 1g IV TID, s/p vancomycin 1g IV x 1; takes fluconazole at home, start IV here, start bactrim DS tab as on at home for MRSA suppression; Hx of MRSA bacteremia, multiple PNA's, fungal infx; ID consult; WBC 22k  VTE ppx DOAC PUD ppx protonix  f/u labs, replete lytes PRN  No central vascular access or indwelling catheters  Wound, podiatry consults  Hgb 14.1, Plt  203, no s/s active bleeding      Dispo: Admit to critical care svc. IVF, abx.     TOTAL CRITICAL CARE TIME: 70 minutes  (EXCLUSIVE of any non bundled procedures)    Note: This time spent INCLUDES time spent directly as this patient's bedside with evaluation, review of chart including review of laboratory and imaging studies, interpretation of vital signs and cardiac output measurements, any necessary ventilator management, and time spent discussing plan of care with patient and family, including goals of care discussion.   A:    72F  HD # 1  FULL CODE    Here for:    1. Acute hypoxic resp failure 2/2  2. PNA  3. Hyperglycemia  4. Severe sepsis 2/2 above  5. Tracheal malacia    This patient requires critical care for support of one or more vital organ systems with a high probability of imminent or life threatening deterioration in his/her condition    P:    Severe sepsis, suspect 2/2 PNA  + COVID; previous active infxn ~ 1 month ago; do NOT think this is COVID PNA, suspect bacterial  Resp failure requiring NIV    Complex medical Hx    PRN analgesia, avoid deleriogenic meds.  HD monitoring, PRN vasopressors to maintain MAP > 65;TTE done last admit, EF largely preserved, s/p mechanical valve  On bipap, 10/5/.60; attempt to change to HFNC once in CCU, suspect 2/2 PNA  CT chest to better elucidate underlying lung pathology  Broad spectrum abx, made difficult by copious abx allergies; aztreonam 1g IV TID, s/p vancomycin 1g IV x 1; takes fluconazole at home, start IV here, start bactrim DS tab as on at home for MRSA suppression; Hx of MRSA bacteremia, multiple PNA's, fungal infx; ID consult; WBC 22k  VTE ppx DOAC PUD ppx protonix  f/u labs, replete lytes PRN  No central vascular access or indwelling catheters  Wound, podiatry consults  Hgb 14.1, Plt  203, no s/s active bleeding  Start stress dose hydrocortisone 100mg IV TID, on medrol PO at home    Dispo: Admit to critical care svc. IVF, abx.     Discussed with daughter at bedside  Discussed with Dr Morrison    TOTAL CRITICAL CARE TIME: 70 minutes  (EXCLUSIVE of any non bundled procedures)    Note: This time spent INCLUDES time spent directly as this patient's bedside with evaluation, review of chart including review of laboratory and imaging studies, interpretation of vital signs and cardiac output measurements, any necessary ventilator management, and time spent discussing plan of care with patient and family, including goals of care discussion.

## 2022-12-14 NOTE — CONSULT NOTE ADULT - SUBJECTIVE AND OBJECTIVE BOX
CHIEF COMPLAINT:     S : 72y year old Female with PMHx of adrenal insufficiency, aortic insufficiency, asthma, Afib on Eliquis, colorectal cancer s/p resection with colostomy bag, COPD, DM, DVT, pelvic fracture, rectal bleeding, seizure, TIA, tracheobronchomalacia, type I aortic dissection s/p Bentall procedure and hemiarch replacement 9/6 with Dr. Cabrera presents to the ED BIBA from [facility] for worsening SOB since last night. Pt was found to be hypoxic at [facility] and sent to the ED for evaluation. Pt recently admitted for COVID and RDV Tx at [place]. Pt On bipap. CXR concerning for PNA. On abx.     Podiatry consulted for evaluation of bilateral foot wounds per daughter. Daughter not present at the time of evaluation. Pt resting comfortably at beside on bipap, none verbal, poor historian. Medical hx acquired from pt's chart. No additional pedal complaints reported.       PMH: Atrial fibrillation    Diabetes    Hypertension    COPD (chronic obstructive pulmonary disease)    Adrenal insufficiency    Aortic insufficiency    Pelvic fracture    Asthma    Hypertension    Tracheobronchomalacia    Colorectal cancer    Aortic disease    Rectal bleeding    Seizure    DVT (deep venous thrombosis)    TIA (transient ischemic attack)      PSH:History of partial hysterectomy    H/O total knee replacement, bilateral    History of sinus surgery    Exostosis of orbit, left    H/O pelvic surgery    History of surgery    History of tracheomalacia    S/P bronchoscopy    Rectal bleeding        Allergies:aspirin (Short breath)  Avelox (Short breath; Pruritus)  cefepime (Anaphylaxis)  codeine (Short breath)  Dilaudid (Short breath)  iodine (Short breath; Swelling)  penicillin (Anaphylaxis)  shellfish (Anaphylaxis)  tetanus toxoid (Short breath)  Valium (Short breath)    MEDICATIONS  (STANDING):  albuterol/ipratropium for Nebulization 3 milliLiter(s) Nebulizer every 20 minutes  apixaban 5 milliGRAM(s) Oral two times a day  aztreonam  IVPB 1000 milliGRAM(s) IV Intermittent every 8 hours  aztreonam  IVPB      chlorhexidine 4% Liquid 1 Application(s) Topical <User Schedule>  dextrose 50% Injectable 25 Gram(s) IV Push once  dextrose 50% Injectable 12.5 Gram(s) IV Push once  dextrose 50% Injectable 25 Gram(s) IV Push once  dextrose Oral Gel 15 Gram(s) Oral once  fluconAZOLE IVPB      glucagon  Injectable 1 milliGRAM(s) IntraMuscular once  hydrocortisone sodium succinate Injectable 100 milliGRAM(s) IV Push every 8 hours  insulin glargine Injectable (LANTUS) 18 Unit(s) SubCutaneous at bedtime  insulin lispro (ADMELOG) corrective regimen sliding scale   SubCutaneous three times a day before meals  insulin lispro Injectable (ADMELOG) 5 Unit(s) SubCutaneous before breakfast  insulin lispro Injectable (ADMELOG) 5 Unit(s) SubCutaneous before lunch  insulin lispro Injectable (ADMELOG) 5 Unit(s) SubCutaneous before dinner  metoprolol tartrate 12.5 milliGRAM(s) Oral two times a day  mexiletine 200 milliGRAM(s) Oral every 8 hours  pantoprazole    Tablet 40 milliGRAM(s) Oral before breakfast  trimethoprim  160 mG/sulfamethoxazole 800 mG 1 Tablet(s) Oral daily    MEDICATIONS  (PRN):      Labs:                          14.1   22.41 )-----------( 203      ( 14 Dec 2022 11:19 )             46.0     WBC Trend  22.41<H> Date (12-14 @ 11:19)  7.26 Date (11-30 @ 09:50)  5.23 Date (11-28 @ 09:52)  9.05 Date (11-26 @ 08:13)  10.11 Date (11-25 @ 17:35)  14.54<H> Date (11-25 @ 10:05)  11.95<H> Date (11-24 @ 20:00)  9.59 Date (11-22 @ 08:40)      Chem  12-14    136  |  104  |  21  ----------------------------<  213<H>  4.0   |  22  |  1.16    Ca    9.5      14 Dec 2022 11:19    TPro  7.0  /  Alb  2.8<L>  /  TBili  0.4  /  DBili  x   /  AST  20  /  ALT  20  /  AlkPhos  99  12-14          T(F): 97 (12-14-22 @ 16:00), Max: 97.7 (12-14-22 @ 14:00)  HR: 103 (12-14-22 @ 16:00) (103 - 135)  BP: 100/59 (12-14-22 @ 16:00) (90/58 - 100/70)  RR: 27 (12-14-22 @ 16:00) (20 - 27)  SpO2: 95% (12-14-22 @ 16:00) (82% - 99%)  Wt(kg): --    REVIEW OF SYSTEMS:  All other review of systems is negative unless indicated above    Physical Exam:   Constitutional: labored breathing, somnolence  Derm:  dry and supple bilateral.    Ulceration to left heel, superficial in nature, no hyperkeratotic border, wound base Fibrogranular, wound size (approx 1cm X 1cm), no edema, no timi-wound erythema, no purulence, no fluctuance, no tracking/tunneling, no probe to bone, no malodor.    Hemorraghic bullae at medial Right midfoot and Right medial malleolus, no drainage, no PTB, no purulence, no malodor.   Vascular: Dorsalis Pedis and Posterior Tibial pulses 1/4.  Capillary re-fill time less then 3 seconds digits 1-5 bilateral.    Neuro: Unable to assess due to pt condition  MSK: Unable to assess due to pt condition       Date of Consult: 12/14/22  HPI: 72y year old Female with PMHx of adrenal insufficiency, aortic insufficiency, asthma, Afib on Eliquis, colorectal cancer s/p resection with colostomy bag, COPD, DM, DVT, pelvic fracture, rectal bleeding, seizure, TIA, tracheobronchomalacia, type I aortic dissection s/p Bentall procedure and hemiarch replacement 9/6 with Dr. Cabrera presents to the ED BIBA from [facility] for worsening SOB since last night. Pt was found to be hypoxic at [facility] and sent to the ED for evaluation. Pt recently admitted for COVID and RDV Tx at [place]. Pt On bipap. CXR concerning for PNA. On abx.     Podiatry consulted for evaluation of bilateral foot wounds per daughter. Daughter not present at the time of evaluation. Pt resting comfortably at beside on bipap, none verbal, poor historian. Medical hx acquired from pt's chart. No additional pedal complaints reported.       PMH: Atrial fibrillation    Diabetes    Hypertension    COPD (chronic obstructive pulmonary disease)    Adrenal insufficiency    Aortic insufficiency    Pelvic fracture    Asthma    Hypertension    Tracheobronchomalacia    Colorectal cancer    Aortic disease    Rectal bleeding    Seizure    DVT (deep venous thrombosis)    TIA (transient ischemic attack)      PSH:History of partial hysterectomy    H/O total knee replacement, bilateral    History of sinus surgery    Exostosis of orbit, left    H/O pelvic surgery    History of surgery    History of tracheomalacia    S/P bronchoscopy    Rectal bleeding        Allergies:aspirin (Short breath)  Avelox (Short breath; Pruritus)  cefepime (Anaphylaxis)  codeine (Short breath)  Dilaudid (Short breath)  iodine (Short breath; Swelling)  penicillin (Anaphylaxis)  shellfish (Anaphylaxis)  tetanus toxoid (Short breath)  Valium (Short breath)    MEDICATIONS  (STANDING):  albuterol/ipratropium for Nebulization 3 milliLiter(s) Nebulizer every 20 minutes  apixaban 5 milliGRAM(s) Oral two times a day  aztreonam  IVPB 1000 milliGRAM(s) IV Intermittent every 8 hours  aztreonam  IVPB      chlorhexidine 4% Liquid 1 Application(s) Topical <User Schedule>  dextrose 50% Injectable 25 Gram(s) IV Push once  dextrose 50% Injectable 12.5 Gram(s) IV Push once  dextrose 50% Injectable 25 Gram(s) IV Push once  dextrose Oral Gel 15 Gram(s) Oral once  fluconAZOLE IVPB      glucagon  Injectable 1 milliGRAM(s) IntraMuscular once  hydrocortisone sodium succinate Injectable 100 milliGRAM(s) IV Push every 8 hours  insulin glargine Injectable (LANTUS) 18 Unit(s) SubCutaneous at bedtime  insulin lispro (ADMELOG) corrective regimen sliding scale   SubCutaneous three times a day before meals  insulin lispro Injectable (ADMELOG) 5 Unit(s) SubCutaneous before breakfast  insulin lispro Injectable (ADMELOG) 5 Unit(s) SubCutaneous before lunch  insulin lispro Injectable (ADMELOG) 5 Unit(s) SubCutaneous before dinner  metoprolol tartrate 12.5 milliGRAM(s) Oral two times a day  mexiletine 200 milliGRAM(s) Oral every 8 hours  pantoprazole    Tablet 40 milliGRAM(s) Oral before breakfast  trimethoprim  160 mG/sulfamethoxazole 800 mG 1 Tablet(s) Oral daily    MEDICATIONS  (PRN):      Labs:                          14.1   22.41 )-----------( 203      ( 14 Dec 2022 11:19 )             46.0     WBC Trend  22.41<H> Date (12-14 @ 11:19)  7.26 Date (11-30 @ 09:50)  5.23 Date (11-28 @ 09:52)  9.05 Date (11-26 @ 08:13)  10.11 Date (11-25 @ 17:35)  14.54<H> Date (11-25 @ 10:05)  11.95<H> Date (11-24 @ 20:00)  9.59 Date (11-22 @ 08:40)      Chem  12-14    136  |  104  |  21  ----------------------------<  213<H>  4.0   |  22  |  1.16    Ca    9.5      14 Dec 2022 11:19    TPro  7.0  /  Alb  2.8<L>  /  TBili  0.4  /  DBili  x   /  AST  20  /  ALT  20  /  AlkPhos  99  12-14          T(F): 97 (12-14-22 @ 16:00), Max: 97.7 (12-14-22 @ 14:00)  HR: 103 (12-14-22 @ 16:00) (103 - 135)  BP: 100/59 (12-14-22 @ 16:00) (90/58 - 100/70)  RR: 27 (12-14-22 @ 16:00) (20 - 27)  SpO2: 95% (12-14-22 @ 16:00) (82% - 99%)  Wt(kg): --    REVIEW OF SYSTEMS:  All other review of systems is negative unless indicated above    Physical Exam:   Constitutional: labored breathing, somnolence  Derm:  dry and supple bilateral.    Ulceration to left heel, superficial in nature, no hyperkeratotic border, wound base Fibrogranular, wound size (approx 1cm X 1cm), no edema, no timi-wound erythema, no purulence, no fluctuance, no tracking/tunneling, no probe to bone, no malodor.    Hemorraghic bullae at medial Right midfoot and Right medial malleolus, no drainage, no PTB, no purulence, no malodor.   Vascular: Dorsalis Pedis and Posterior Tibial pulses 1/4.  Capillary re-fill time less then 3 seconds digits 1-5 bilateral.    Neuro: Unable to assess due to pt condition  MSK: Unable to assess due to pt condition       Date of Consult: 12/14/22  HPI: 72y year old Female with PMHx of adrenal insufficiency, aortic insufficiency, asthma, Afib on Eliquis, colorectal cancer s/p resection with colostomy bag, COPD, DM, DVT, pelvic fracture, rectal bleeding, seizure, TIA, tracheobronchomalacia, type I aortic dissection s/p Bentall procedure and hemiarch replacement 9/6 with Dr. Cabrera presents to the ED BIBA from [facility] for worsening SOB since last night. Pt was found to be hypoxic at [facility] and sent to the ED for evaluation. Pt recently admitted for COVID and RDV Tx at [place]. Pt On bipap. CXR concerning for PNA. On abx.     Podiatry consulted for evaluation of bilateral foot wounds per daughter request. Daughter not present at the time of evaluation. Pt resting comfortably at beside on bipap, none verbal, poor historian. Medical hx acquired from pt's chart. No additional pedal complaints reported.       PMH: Atrial fibrillation    Diabetes    Hypertension    COPD (chronic obstructive pulmonary disease)    Adrenal insufficiency    Aortic insufficiency    Pelvic fracture    Asthma    Hypertension    Tracheobronchomalacia    Colorectal cancer    Aortic disease    Rectal bleeding    Seizure    DVT (deep venous thrombosis)    TIA (transient ischemic attack)      PSH:History of partial hysterectomy    H/O total knee replacement, bilateral    History of sinus surgery    Exostosis of orbit, left    H/O pelvic surgery    History of surgery    History of tracheomalacia    S/P bronchoscopy    Rectal bleeding        Allergies:aspirin (Short breath)  Avelox (Short breath; Pruritus)  cefepime (Anaphylaxis)  codeine (Short breath)  Dilaudid (Short breath)  iodine (Short breath; Swelling)  penicillin (Anaphylaxis)  shellfish (Anaphylaxis)  tetanus toxoid (Short breath)  Valium (Short breath)    MEDICATIONS  (STANDING):  albuterol/ipratropium for Nebulization 3 milliLiter(s) Nebulizer every 20 minutes  apixaban 5 milliGRAM(s) Oral two times a day  aztreonam  IVPB 1000 milliGRAM(s) IV Intermittent every 8 hours  aztreonam  IVPB      chlorhexidine 4% Liquid 1 Application(s) Topical <User Schedule>  dextrose 50% Injectable 25 Gram(s) IV Push once  dextrose 50% Injectable 12.5 Gram(s) IV Push once  dextrose 50% Injectable 25 Gram(s) IV Push once  dextrose Oral Gel 15 Gram(s) Oral once  fluconAZOLE IVPB      glucagon  Injectable 1 milliGRAM(s) IntraMuscular once  hydrocortisone sodium succinate Injectable 100 milliGRAM(s) IV Push every 8 hours  insulin glargine Injectable (LANTUS) 18 Unit(s) SubCutaneous at bedtime  insulin lispro (ADMELOG) corrective regimen sliding scale   SubCutaneous three times a day before meals  insulin lispro Injectable (ADMELOG) 5 Unit(s) SubCutaneous before breakfast  insulin lispro Injectable (ADMELOG) 5 Unit(s) SubCutaneous before lunch  insulin lispro Injectable (ADMELOG) 5 Unit(s) SubCutaneous before dinner  metoprolol tartrate 12.5 milliGRAM(s) Oral two times a day  mexiletine 200 milliGRAM(s) Oral every 8 hours  pantoprazole    Tablet 40 milliGRAM(s) Oral before breakfast  trimethoprim  160 mG/sulfamethoxazole 800 mG 1 Tablet(s) Oral daily    MEDICATIONS  (PRN):      Labs:                          14.1   22.41 )-----------( 203      ( 14 Dec 2022 11:19 )             46.0     WBC Trend  22.41<H> Date (12-14 @ 11:19)  7.26 Date (11-30 @ 09:50)  5.23 Date (11-28 @ 09:52)  9.05 Date (11-26 @ 08:13)  10.11 Date (11-25 @ 17:35)  14.54<H> Date (11-25 @ 10:05)  11.95<H> Date (11-24 @ 20:00)  9.59 Date (11-22 @ 08:40)      Chem  12-14    136  |  104  |  21  ----------------------------<  213<H>  4.0   |  22  |  1.16    Ca    9.5      14 Dec 2022 11:19    TPro  7.0  /  Alb  2.8<L>  /  TBili  0.4  /  DBili  x   /  AST  20  /  ALT  20  /  AlkPhos  99  12-14          T(F): 97 (12-14-22 @ 16:00), Max: 97.7 (12-14-22 @ 14:00)  HR: 103 (12-14-22 @ 16:00) (103 - 135)  BP: 100/59 (12-14-22 @ 16:00) (90/58 - 100/70)  RR: 27 (12-14-22 @ 16:00) (20 - 27)  SpO2: 95% (12-14-22 @ 16:00) (82% - 99%)  Wt(kg): --    REVIEW OF SYSTEMS:  All other review of systems is negative unless indicated above    Physical Exam:   Constitutional: labored breathing, somnolence  Derm:  dry and supple bilateral.    Ulceration to left heel, superficial in nature, no hyperkeratotic border, wound base Fibrogranular, wound size (approx 1cm X 1cm), no edema, no timi-wound erythema, no purulence, no fluctuance, no tracking/tunneling, no probe to bone, no malodor.    Hemorraghic bullae at medial Right midfoot and Right medial malleolus, no drainage, no PTB, no purulence, no malodor.   Vascular: Dorsalis Pedis and Posterior Tibial pulses 1/4.  Capillary re-fill time less then 3 seconds digits 1-5 bilateral.    Neuro: Unable to assess due to pt condition  MSK: Unable to assess due to pt condition

## 2022-12-14 NOTE — ED PROVIDER NOTE - PHYSICAL EXAMINATION
Constitutional: NAD AAOx3  Eyes: PERRL, EOMI  Head: Normocephalic atraumatic  Mouth: MMM  Cardiac: regular rate   Resp: Lungs CTAB  GI: Abd s/nt/nd  Neuro: CN2-12 intact  Extremities: Intact distal pulses b/l, no calf tenderness, normal ROM b/l UE and LE   Skin: No rashes Constitutional: NAD AAOx3  Eyes: PERRL, EOMI  Head: Normocephalic atraumatic  Mouth: MMM  Cardiac: Tachycardic  Resp: Coarse breath sounds b/l.  GI: Abd s/nt/nd  Neuro: CN2-12 intact  Extremities: Intact distal pulses b/l, no calf tenderness, normal ROM b/l UE and LE   Skin: No rashes Constitutional: NAD AAOx3  Eyes: PERRL, EOMI  Head: Normocephalic atraumatic  Mouth: MMM  Cardiac: Tachycardic  Resp: Coarse breath sounds b/l.  GI: Abd s/nt/nd  Neuro: CN2-12 intact  Extremities: Intact distal pulses b/l  Skin: No rashes

## 2022-12-14 NOTE — ED ADULT NURSE NOTE - OBJECTIVE STATEMENT
Pt [facility] ER from [facility] c/o SOB and nausea. Onset of symptoms began a couple days ago. Labored breathing, tachypneic, equal b/l chest expansion.

## 2022-12-14 NOTE — ED PROVIDER NOTE - OBJECTIVE STATEMENT
71 y/o female with a PMHx of adrenal insufficiency, aortic insufficiency, asthma, Afib on Eliquis, colorectal cancer s/p resection with colostomy bag, COPD, DM, DVT, pelvic fracture, rectal bleeding, seizure, TIA, tracheobronchomalacia, type I aortic dissection s/p Bentall procedure and hemiarch replacement 9/6 with Dr. Cabrera [facility] for [facility]. Pt recently admitted for COVID and received Remdesivir.

## 2022-12-14 NOTE — H&P ADULT - NSICDXPASTSURGICALHX_GEN_ALL_CORE_FT
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 by Dr Zapien    Rectal bleeding exam under anesthesia ([facility]) 2/2018    S/P bronchoscopy 6/5/2018 - [place] (Dr Zapien) no evidence of tracheobronchomalacia in trachea or bronchial tubes

## 2022-12-14 NOTE — ED ADULT NURSE NOTE - NSIMPLEMENTINTERV_GEN_ALL_ED
Implemented All Universal Safety Interventions:  [place] to call system. Call bell, personal items and telephone within reach. Instruct patient to call for assistance. Room bathroom lighting operational. Non-slip footwear when patient is off stretcher. Physically safe environment: no spills, clutter or unnecessary equipment. Stretcher in lowest position, wheels locked, appropriate side rails in place.

## 2022-12-14 NOTE — H&P ADULT - HISTORY OF PRESENT ILLNESS
S:    Pt seen and examined  HD # 1  FULL CODE  PMHx PMHx of adrenal insufficiency, aortic insufficiency, asthma, Afib on Eliquis, colorectal cancer s/p resection with colostomy bag, COPD, DM, DVT, pelvic fracture, rectal bleeding, seizure, TIA, tracheobronchomalacia, type I aortic dissection s/p Bentall procedure and hemiarch replacement 9/6 with Dr. Cabrera presents to the ED BIBA from [facility] for worsening SOB since last night. Pt was found to be hypoxic at [facility] and sent to the ED for evaluation. Pt recently admitted for COVID and RDV Tx at [place].    Placed on bipap, ICU consulted.     12/14 PM: On bipap. CXR concerning for PNA. On abx. Multiple abx allergies noted.

## 2022-12-14 NOTE — ED CLERICAL - NS ED CLERK NOTE PRE-ARRIVAL INFORMATION; ADDITIONAL PRE-ARRIVAL INFORMATION
This patient is enrolled in the Follow Your Heart program and has undergone a cardiac surgery procedure within the last 30 days and has active care navigation. This patient can be followed up by the care navigation team within 24 hours. To arrange close follow-up or to obtain additional clinical information about this patient, please call the contact number above. Please call the cardiac surgery team once patient is registered at [phone] for consultation PRIOR to disposition decision.  The patient recently underwent a cardiac surgery procedure and the team can assist in acute medical management.

## 2022-12-15 DIAGNOSIS — R79.89 OTHER SPECIFIED ABNORMAL FINDINGS OF BLOOD CHEMISTRY: ICD-10-CM

## 2022-12-15 DIAGNOSIS — A41.9 SEPSIS, UNSPECIFIED ORGANISM: ICD-10-CM

## 2022-12-15 DIAGNOSIS — J18.9 PNEUMONIA, UNSPECIFIED ORGANISM: ICD-10-CM

## 2022-12-15 LAB
A1C WITH ESTIMATED AVERAGE GLUCOSE RESULT: 8.3 % — HIGH (ref 4–5.6)
A1C WITH ESTIMATED AVERAGE GLUCOSE RESULT: 8.6 % — HIGH (ref 4–5.6)
ALBUMIN SERPL ELPH-MCNC: 2 G/DL — LOW (ref 3.3–5)
ALP SERPL-CCNC: 116 U/L — SIGNIFICANT CHANGE UP (ref 40–120)
ALT FLD-CCNC: 15 U/L — SIGNIFICANT CHANGE UP (ref 12–78)
ANION GAP SERPL CALC-SCNC: 8 MMOL/L — SIGNIFICANT CHANGE UP (ref 5–17)
AST SERPL-CCNC: 18 U/L — SIGNIFICANT CHANGE UP (ref 15–37)
BASOPHILS # BLD AUTO: 0 K/UL — SIGNIFICANT CHANGE UP (ref 0–0.2)
BASOPHILS NFR BLD AUTO: 0 % — SIGNIFICANT CHANGE UP (ref 0–2)
BILIRUB SERPL-MCNC: 0.2 MG/DL — SIGNIFICANT CHANGE UP (ref 0.2–1.2)
BUN SERPL-MCNC: 20 MG/DL — SIGNIFICANT CHANGE UP (ref 7–23)
CALCIUM SERPL-MCNC: 8.6 MG/DL — SIGNIFICANT CHANGE UP (ref 8.5–10.1)
CHLORIDE SERPL-SCNC: 108 MMOL/L — SIGNIFICANT CHANGE UP (ref 96–108)
CO2 SERPL-SCNC: 24 MMOL/L — SIGNIFICANT CHANGE UP (ref 22–31)
CREAT SERPL-MCNC: 0.84 MG/DL — SIGNIFICANT CHANGE UP (ref 0.5–1.3)
CRP SERPL-MCNC: 289 MG/L — HIGH
EGFR: 73 ML/MIN/1.73M2 — SIGNIFICANT CHANGE UP
EOSINOPHIL # BLD AUTO: 0 K/UL — SIGNIFICANT CHANGE UP (ref 0–0.5)
EOSINOPHIL NFR BLD AUTO: 0 % — SIGNIFICANT CHANGE UP (ref 0–6)
ERYTHROCYTE [SEDIMENTATION RATE] IN BLOOD: 39 MM/HR — HIGH (ref 0–20)
ESTIMATED AVERAGE GLUCOSE: 192 MG/DL — HIGH (ref 68–114)
ESTIMATED AVERAGE GLUCOSE: 200 MG/DL — HIGH (ref 68–114)
GLUCOSE SERPL-MCNC: 193 MG/DL — HIGH (ref 70–99)
HCT VFR BLD CALC: 31.4 % — LOW (ref 34.5–45)
HGB BLD-MCNC: 9.9 G/DL — LOW (ref 11.5–15.5)
LACTATE SERPL-SCNC: 3.4 MMOL/L — HIGH (ref 0.7–2)
LYMPHOCYTES # BLD AUTO: 0.27 K/UL — LOW (ref 1–3.3)
LYMPHOCYTES # BLD AUTO: 1 % — LOW (ref 13–44)
MAGNESIUM SERPL-MCNC: 1.7 MG/DL — SIGNIFICANT CHANGE UP (ref 1.6–2.6)
MCHC RBC-ENTMCNC: 22.9 PG — LOW (ref 27–34)
MCHC RBC-ENTMCNC: 31.5 GM/DL — LOW (ref 32–36)
MCV RBC AUTO: 72.7 FL — LOW (ref 80–100)
MONOCYTES # BLD AUTO: 0.53 K/UL — SIGNIFICANT CHANGE UP (ref 0–0.9)
MONOCYTES NFR BLD AUTO: 2 % — SIGNIFICANT CHANGE UP (ref 2–14)
NEUTROPHILS # BLD AUTO: 25.72 K/UL — HIGH (ref 1.8–7.4)
NEUTROPHILS NFR BLD AUTO: 93 % — HIGH (ref 43–77)
NRBC # BLD: SIGNIFICANT CHANGE UP /100 WBCS (ref 0–0)
PHOSPHATE SERPL-MCNC: 3.3 MG/DL — SIGNIFICANT CHANGE UP (ref 2.5–4.5)
PLATELET # BLD AUTO: 138 K/UL — LOW (ref 150–400)
POTASSIUM SERPL-MCNC: 4.4 MMOL/L — SIGNIFICANT CHANGE UP (ref 3.5–5.3)
POTASSIUM SERPL-SCNC: 4.4 MMOL/L — SIGNIFICANT CHANGE UP (ref 3.5–5.3)
PROCALCITONIN SERPL-MCNC: 6.03 NG/ML — HIGH (ref 0.02–0.1)
PROT SERPL-MCNC: 5.6 GM/DL — LOW (ref 6–8.3)
RBC # BLD: 4.32 M/UL — SIGNIFICANT CHANGE UP (ref 3.8–5.2)
RBC # FLD: 18 % — HIGH (ref 10.3–14.5)
SODIUM SERPL-SCNC: 140 MMOL/L — SIGNIFICANT CHANGE UP (ref 135–145)
TSH SERPL-MCNC: 1.1 UU/ML — SIGNIFICANT CHANGE UP (ref 0.34–4.82)
WBC # BLD: 26.52 K/UL — HIGH (ref 3.8–10.5)
WBC # FLD AUTO: 26.52 K/UL — HIGH (ref 3.8–10.5)

## 2022-12-15 PROCEDURE — 99232 SBSQ HOSP IP/OBS MODERATE 35: CPT

## 2022-12-15 RX ORDER — SODIUM CHLORIDE 9 MG/ML
1000 INJECTION, SOLUTION INTRAVENOUS
Refills: 0 | Status: COMPLETED | OUTPATIENT
Start: 2022-12-15 | End: 2022-12-15

## 2022-12-15 RX ORDER — MEROPENEM 1 G/30ML
1000 INJECTION INTRAVENOUS EVERY 8 HOURS
Refills: 0 | Status: COMPLETED | OUTPATIENT
Start: 2022-12-15 | End: 2022-12-22

## 2022-12-15 RX ADMIN — Medication 12.5 MILLIGRAM(S): at 10:18

## 2022-12-15 RX ADMIN — Medication 12.5 MILLIGRAM(S): at 21:09

## 2022-12-15 RX ADMIN — APIXABAN 5 MILLIGRAM(S): 2.5 TABLET, FILM COATED ORAL at 21:09

## 2022-12-15 RX ADMIN — APIXABAN 5 MILLIGRAM(S): 2.5 TABLET, FILM COATED ORAL at 10:19

## 2022-12-15 RX ADMIN — Medication 50 MILLIGRAM(S): at 05:37

## 2022-12-15 RX ADMIN — Medication 5 UNIT(S): at 16:15

## 2022-12-15 RX ADMIN — MEROPENEM 100 MILLIGRAM(S): 1 INJECTION INTRAVENOUS at 21:09

## 2022-12-15 RX ADMIN — MEXILETINE HYDROCHLORIDE 200 MILLIGRAM(S): 150 CAPSULE ORAL at 15:09

## 2022-12-15 RX ADMIN — Medication 100 MILLIGRAM(S): at 21:12

## 2022-12-15 RX ADMIN — Medication 2: at 16:14

## 2022-12-15 RX ADMIN — SODIUM CHLORIDE 500 MILLILITER(S): 9 INJECTION, SOLUTION INTRAVENOUS at 15:11

## 2022-12-15 RX ADMIN — Medication 1 TABLET(S): at 10:19

## 2022-12-15 RX ADMIN — CHLORHEXIDINE GLUCONATE 1 APPLICATION(S): 213 SOLUTION TOPICAL at 05:38

## 2022-12-15 RX ADMIN — MEXILETINE HYDROCHLORIDE 200 MILLIGRAM(S): 150 CAPSULE ORAL at 21:12

## 2022-12-15 RX ADMIN — MEROPENEM 100 MILLIGRAM(S): 1 INJECTION INTRAVENOUS at 15:09

## 2022-12-15 RX ADMIN — SODIUM CHLORIDE 500 MILLILITER(S): 9 INJECTION, SOLUTION INTRAVENOUS at 10:18

## 2022-12-15 RX ADMIN — Medication 1 APPLICATION(S): at 10:56

## 2022-12-15 RX ADMIN — Medication 100 MILLIGRAM(S): at 15:09

## 2022-12-15 RX ADMIN — INSULIN GLARGINE 18 UNIT(S): 100 INJECTION, SOLUTION SUBCUTANEOUS at 21:08

## 2022-12-15 RX ADMIN — FLUCONAZOLE 100 MILLIGRAM(S): 150 TABLET ORAL at 15:10

## 2022-12-15 RX ADMIN — Medication 100 MILLIGRAM(S): at 05:41

## 2022-12-15 RX ADMIN — MEXILETINE HYDROCHLORIDE 200 MILLIGRAM(S): 150 CAPSULE ORAL at 05:38

## 2022-12-15 NOTE — DIETITIAN INITIAL EVALUATION ADULT - ORAL INTAKE PTA/DIET HISTORY
unable to obtain 2/2 sleeping - from Maya  As per RD note 11/26/22: "Pt known to this RD from previous [facility] admission. Pt received enteral nutrition for majority of length of stay while intubated/trached, decannulated 11/12. S/p MBS 11/16 "Puree/mildly thick liquids, single sips, no straws." Pt states towards end of hospitalization she was tolerating PO with fair to good intake, however states nausea started upon transfer to rehab (11/22) and pt with poor PO intake since. Receiving multivitamin, vitamin C, and magnesium oxide PTA. Pt endorses shellfish allergy."

## 2022-12-15 NOTE — DIETITIAN INITIAL EVALUATION ADULT - NSICDXPASTSURGICALHX_GEN_ALL_CORE_FT
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    H/O pelvic surgery 5 years ago - s/p fracture    H/O total knee replacement, bilateral 5 years ago    History of partial hysterectomy 30 years ago - fibroids    History of sinus surgery multiple sinus surgeries    History of tracheomalacia 2015 - attempted tracheal stenting ([facility] course complicated by obstruction, respiratory failure, multiple CPR attempts -  stent discontinued; 10/20/2016 Tracheobronchoplasty (Prolene Mesh) performed at [facility] by Dr Zapien    Rectal bleeding exam under anesthesia ([facility]) 2/2018    S/P bronchoscopy 6/5/2018 - [facility] (Dr Zapien) no evidence of tracheobronchomalacia in trachea or bronchial tubes

## 2022-12-15 NOTE — DIETITIAN INITIAL EVALUATION ADULT - NSFNSGIIOFT_GEN_A_CORE
I&O's Detail    14 Dec 2022 07:01  -  15 Dec 2022 07:00  --------------------------------------------------------  IN:    IV PiggyBack: 50 mL  Total IN: 50 mL    OUT:    Voided (mL): 500 mL  Total OUT: 500 mL    Total NET: -450 mL

## 2022-12-15 NOTE — DIETITIAN INITIAL EVALUATION ADULT - OTHER INFO
71 YO F PMHx of adrenal insufficiency, aortic insufficiency, asthma, Afib on Eliquis, colorectal cancer s/p resection with colostomy bag, COPD, DM, DVT, pelvic fracture, rectal bleeding, seizure, TIA, tracheobronchomalacia, type I aortic dissection s/p Bentall procedure and hemiarch replacement 9/6 with Dr. Cabrera presents to the ED BIBA from [facility] for worsening SOB since last night. Pt was found to be hypoxic at [facility] and sent to the ED for evaluation. Pt recently admitted for COVID and RDV Tx at [facility]. COVID-19 (+)    Pt seen previously by RD's at [facility] and met criteria for severe PCM. Unable to obtain current diet/ wt hx 2/2 sleeping upon RD visit. Hx of poor PO intake. Reports UBW ~140#. On 11/24 weighed 136#. RD obtained current bed scale wt on 12/15 at 122# - unintentional wt loss of 14#/ 10.3% x 1 month (severe and clinically significant). NFPE noted with mod to severe muscle/ fat wasting - noted worsening wasting since seen by RD in Nov 2022. RD observed ~50% of tray consumed, but lacking in protein-rich foods. On soft and bite-sized, consistent CHO diet. Not seen by SLP. POCT variable and A1C 8.6%, elevated for age. Would rec'd to obtain SLP consult to determine safest diet consistency and maintain CHO restriction. Will add ensure max shakes BID. See other recommendations below.

## 2022-12-15 NOTE — DIETITIAN INITIAL EVALUATION ADULT - PERTINENT MEDS FT
MEDICATIONS  (STANDING):  albuterol/ipratropium for Nebulization 3 milliLiter(s) Nebulizer every 20 minutes  apixaban 5 milliGRAM(s) Oral two times a day  chlorhexidine 4% Liquid 1 Application(s) Topical <User Schedule>  dextrose 50% Injectable 25 Gram(s) IV Push once  dextrose 50% Injectable 12.5 Gram(s) IV Push once  dextrose 50% Injectable 25 Gram(s) IV Push once  dextrose Oral Gel 15 Gram(s) Oral once  fluconAZOLE IVPB      fluconAZOLE IVPB 400 milliGRAM(s) IV Intermittent every 24 hours  glucagon  Injectable 1 milliGRAM(s) IntraMuscular once  hydrocortisone sodium succinate Injectable 100 milliGRAM(s) IV Push every 8 hours  insulin glargine Injectable (LANTUS) 18 Unit(s) SubCutaneous at bedtime  insulin lispro (ADMELOG) corrective regimen sliding scale   SubCutaneous three times a day before meals  insulin lispro Injectable (ADMELOG) 5 Unit(s) SubCutaneous before breakfast  insulin lispro Injectable (ADMELOG) 5 Unit(s) SubCutaneous before lunch  insulin lispro Injectable (ADMELOG) 5 Unit(s) SubCutaneous before dinner  meropenem  IVPB 1000 milliGRAM(s) IV Intermittent every 8 hours  metoprolol tartrate 12.5 milliGRAM(s) Oral two times a day  mexiletine 200 milliGRAM(s) Oral every 8 hours  pantoprazole    Tablet 40 milliGRAM(s) Oral before breakfast  silver sulfADIAZINE 1% Cream 1 Application(s) Topical daily  trimethoprim  160 mG/sulfamethoxazole 800 mG 1 Tablet(s) Oral daily    MEDICATIONS  (PRN):

## 2022-12-15 NOTE — DIETITIAN INITIAL EVALUATION ADULT - ADD RECOMMEND
1) Obtain SLP consult to determine safest diet consistency 2/2 hx of chewing/ swallowing difficulties, 2) Add ensure max BID to optimize PO intake (provides 180 kcal, 30g protein/ shake), 3) Recommend to add MVI w/minerals, Vit C 500 mg BID, add Zinc Sulfate 220 mg x 10 days to promote wound healing, 4) Encourage protein-rich foods, maximize food preferences, 5) Consider adding thiamine 100 mg daily 2/2 poor PO intake/ malnutrition, 6) Monitor bowel movements, if no BM for >3 days, consider implementing bowel regimen, 7) Confirm goals of care regarding nutrition support, 8) Maintain aspiration precautions, back of bed >45 degrees, 9) provide feeding assistance/ meal encouragement prn, 10) Obtain vitamin D 25OH level to assess nutriture. RD will continue to monitor PO intake, labs, hydration, and wt prn.

## 2022-12-15 NOTE — DIETITIAN NUTRITION RISK NOTIFICATION - PHYSICAL ASSESSMENT CLAVICLES
[No Acute Distress] : no acute distress [Well Developed] : well developed [Well-Appearing] : well-appearing [Well Nourished] : well nourished [PERRL] : pupils equal round and reactive to light [EOMI] : extraocular movements intact severe [Normal Outer Ear/Nose] : the outer ears and nose were normal in appearance [Normal Oropharynx] : the oropharynx was normal [No JVD] : no jugular venous distention [No Lymphadenopathy] : no lymphadenopathy [Thyroid Normal, No Nodules] : the thyroid was normal and there were no nodules present [Supple] : supple [Clear to Auscultation] : lungs were clear to auscultation bilaterally [No Respiratory Distress] : no respiratory distress  [No Accessory Muscle Use] : no accessory muscle use [Normal Rate] : normal rate  [Normal S1, S2] : normal S1 and S2 [Regular Rhythm] : with a regular rhythm [No Varicosities] : no varicosities [No Abdominal Bruit] : a ~M bruit was not heard ~T in the abdomen [No Murmur] : no murmur heard [No Carotid Bruits] : no carotid bruits [No Edema] : there was no peripheral edema [Pedal Pulses Present] : the pedal pulses are present [No Palpable Aorta] : no palpable aorta [Soft] : abdomen soft [No Extremity Clubbing/Cyanosis] : no extremity clubbing/cyanosis [Non Tender] : non-tender [No Masses] : no abdominal mass palpated [No HSM] : no HSM [Non-distended] : non-distended [Normal Posterior Cervical Nodes] : no posterior cervical lymphadenopathy [Normal Bowel Sounds] : normal bowel sounds [Normal Axillary Nodes] : no axillary lymphadenopathy [Normal Supraclavicular Nodes] : no supraclavicular lymphadenopathy [Normal Femoral Nodes] : no femoral lymphadenopathy [Normal Anterior Cervical Nodes] : no anterior cervical lymphadenopathy [No CVA Tenderness] : no CVA  tenderness [No Joint Swelling] : no joint swelling [No Spinal Tenderness] : no spinal tenderness [Coordination Grossly Intact] : coordination grossly intact [Grossly Normal Strength/Tone] : grossly normal strength/tone [No Focal Deficits] : no focal deficits [No Rash] : no rash [Deep Tendon Reflexes (DTR)] : deep tendon reflexes were 2+ and symmetric [Normal Gait] : normal gait [Normal Affect] : the affect was normal [Normal Insight/Judgement] : insight and judgment were intact [de-identified] : redness of eyes

## 2022-12-15 NOTE — DIETITIAN NUTRITION RISK NOTIFICATION - TREATMENT: THE FOLLOWING DIET HAS BEEN RECOMMENDED
Diet, Soft and Bite Sized:   Consistent Carbohydrate {No Snacks} (CSTCHO) (12-15-22 @ 10:54) [Active]

## 2022-12-15 NOTE — DIETITIAN INITIAL EVALUATION ADULT - PERTINENT LABORATORY DATA
12-15    140  |  108  |  20  ----------------------------<  193<H>  4.4   |  24  |  0.84    Ca    8.6      15 Dec 2022 06:19  Phos  3.3     12-15  Mg     1.7     12-15    TPro  5.6<L>  /  Alb  2.0<L>  /  TBili  0.2  /  DBili  x   /  AST  18  /  ALT  15  /  AlkPhos  116  12-15  POCT Blood Glucose.: 199 mg/dL (12-15-22 @ 15:19)  A1C with Estimated Average Glucose Result: 8.6 % (12-15-22 @ 06:19)  A1C with Estimated Average Glucose Result: 8.3 % (12-14-22 @ 11:19)  A1C with Estimated Average Glucose Result: 9.4 % (09-06-22 @ 16:46)

## 2022-12-15 NOTE — DIETITIAN INITIAL EVALUATION ADULT - ETIOLOGY
r/t decreased ability to meet increased nutrient needs 2/2 COVID-19 infection/sepsis on COPD/ chewing/ swallowing difficulties

## 2022-12-15 NOTE — CONSULT NOTE ADULT - ASSESSMENT
71 y/o female with h/o adrenal insufficiency, aortic insufficiency, asthma, A.fib on Eliquis, colorectal cancer s/p resection with colostomy bag, COPD, DM, DVT, pelvic fracture, rectal bleeding, seizure, TIA, tracheobronchomalacia, type I aortic dissection s/p Bentall procedure and hemiarch replacement 9/6 with Dr. Cabrera, recent COVID-19 viral syndrome (Nov 2022) s/p remdesivir was admitted on 12/14 from [facility] for worsening SOB since last night. Pt was found to be hypoxic at [facility] and sent to the ED for evaluation. In ER she was placed on BiPAP and received fluconazole and aztreonam.     1. Acute on chronic respiratory failure. LLL pneumonia. Multifocal pneumonia. COPD. Allergy to PCN, cefepime, avelox.   -leukocytosis  -repeat COVID-PCR detected again in respiratory secretions on 12/14, before on 11/24  -obtain BC x 2, urine c/s, sputum c/s     71 y/o female with h/o adrenal insufficiency, aortic insufficiency, asthma, A.fib on Eliquis, colorectal cancer s/p resection with colostomy bag, COPD, DM, DVT, pelvic fracture, rectal bleeding, seizure, TIA, tracheobronchomalacia, type I aortic dissection s/p Bentall procedure and hemiarch replacement 9/6 with Dr. Cabrera, recent COVID-19 viral syndrome (Nov 2022) s/p remdesivir was admitted on 12/14 from [facility] for worsening SOB since last night. Pt was found to be hypoxic at [facility] and sent to the ED for evaluation. In ER she was placed on BiPAP and received fluconazole and aztreonam.     1. Acute on chronic respiratory failure. LLL pneumonia. Multifocal pneumonia. COPD exacerbation. Allergy to PCN, cefepime, avelox.   -leukocytosis  -repeat COVID-PCR detected again in respiratory secretions on 12/14, before on 11/24  -obtain BC x 2, urine c/s, sputum c/s  -start meropenem 1 gm IV q8h  -reason for abx use and side effects reviewed with patient; monitor BMP   -monitor closely in donnie of PCN allergy history  -continue fluconazole 400 mg IV qd - the patient was on fluconazole PTA - unclear reason at this time; continue fluconazole for now  -respirator ycare  -old chart reviewed to assess prior cultures  -monitor temps  -f/u CBC  -supportive care  2. Other issues:   -care per medicine

## 2022-12-15 NOTE — CONSULT NOTE ADULT - SUBJECTIVE AND OBJECTIVE BOX
Patient is a 72y old  Female who presents with a chief complaint of SOB, resp distress    HPI:  73 y/o female with h/o adrenal insufficiency, aortic insufficiency, asthma, A.fib on Eliquis, colorectal cancer s/p resection with colostomy bag, COPD, DM, DVT, pelvic fracture, rectal bleeding, seizure, TIA, tracheobronchomalacia, type I aortic dissection s/p Bentall procedure and hemiarch replacement  with Dr. Cabrera, recent COVID-19 viral syndrome (2022) s/p remdesivir was admitted on  from [facility] for worsening SOB since last night. Pt was found to be hypoxic at [facility] and sent to the ED for evaluation. In ER she was placed on BiPAP and received fluconazole and aztreonam.     PMH: as above  PSH: as above  Meds: per reconciliation sheet, noted below  MEDICATIONS  (STANDING):  albuterol/ipratropium for Nebulization 3 milliLiter(s) Nebulizer every 20 minutes  apixaban 5 milliGRAM(s) Oral two times a day  aztreonam  IVPB 1000 milliGRAM(s) IV Intermittent every 8 hours  aztreonam  IVPB      chlorhexidine 4% Liquid 1 Application(s) Topical <User Schedule>  dextrose 50% Injectable 25 Gram(s) IV Push once  dextrose 50% Injectable 12.5 Gram(s) IV Push once  dextrose 50% Injectable 25 Gram(s) IV Push once  dextrose Oral Gel 15 Gram(s) Oral once  fluconAZOLE IVPB      fluconAZOLE IVPB 400 milliGRAM(s) IV Intermittent every 24 hours  glucagon  Injectable 1 milliGRAM(s) IntraMuscular once  hydrocortisone sodium succinate Injectable 100 milliGRAM(s) IV Push every 8 hours  insulin glargine Injectable (LANTUS) 18 Unit(s) SubCutaneous at bedtime  insulin lispro (ADMELOG) corrective regimen sliding scale   SubCutaneous three times a day before meals  insulin lispro Injectable (ADMELOG) 5 Unit(s) SubCutaneous before breakfast  insulin lispro Injectable (ADMELOG) 5 Unit(s) SubCutaneous before lunch  insulin lispro Injectable (ADMELOG) 5 Unit(s) SubCutaneous before dinner  lactated ringers. 1000 milliLiter(s) (500 mL/Hr) IV Continuous <Continuous>  metoprolol tartrate 12.5 milliGRAM(s) Oral two times a day  mexiletine 200 milliGRAM(s) Oral every 8 hours  pantoprazole    Tablet 40 milliGRAM(s) Oral before breakfast  silver sulfADIAZINE 1% Cream 1 Application(s) Topical daily  trimethoprim  160 mG/sulfamethoxazole 800 mG 1 Tablet(s) Oral daily    MEDICATIONS  (PRN):    Allergies    aspirin (Short breath)  Avelox (Short breath; Pruritus)  cefepime (Anaphylaxis)  codeine (Short breath)  Dilaudid (Short breath)  iodine (Short breath; Swelling)  penicillin (Anaphylaxis)  shellfish (Anaphylaxis)  tetanus toxoid (Short breath)  Valium (Short breath)    Intolerances      Social: no smoking, no alcohol, no illegal drugs; no recent travel, no exposure to TB  FAMILY HISTORY:  Family history of asthma  Family history of breast cancer (Sibling)  Family history of diabetes mellitus type II  no history of premature cardiovascular disease in first degree relatives    ROS: the patient denies fever, no chills, no HA, no seizures, no dizziness, no sore throat, no nasal congestion, no blurry vision, no CP, no palpitations, has SOB, has cough, no abdominal pain, no diarrhea, no N/V, no dysuria, no leg pain, no claudication, no rash, no joint aches, no rectal pain or bleeding, no night sweats  All other systems reviewed and are negative    Vital Signs Last 24 Hrs  T(C): 36.4 (15 Dec 2022 10:05), Max: 36.7 (15 Dec 2022 00:15)  T(F): 97.5 (15 Dec 2022 10:05), Max: 98 (15 Dec 2022 00:15)  HR: 69 (15 Dec 2022 08:00) (66 - 123)  BP: 114/64 (15 Dec 2022 08:00) (90/58 - 126/80)  BP(mean): 75 (15 Dec 2022 08:00) (51 - 90)  RR: 17 (15 Dec 2022 08:00) (16 - 28)  SpO2: 100% (15 Dec 2022 08:00) (89% - 100%)    Parameters below as of 15 Dec 2022 07:00    O2 Flow (L/min): 35  O2 Concentration (%): 50  Daily     Daily Weight in k.1 (15 Dec 2022 06:22)    PE:    Constitutional:  No acute distress  HEENT: NC/AT, EOMI, PERRLA, conjunctivae clear; ears and nose atraumatic; pharynx benign  Neck: supple; thyroid not palpable  Back: no tenderness  Respiratory: respiratory effort normal; crackles at bases  Cardiovascular: S1S2 regular, no murmurs  Abdomen: soft, not tender, not distended, positive BS; no liver or spleen organomegaly  Genitourinary: no suprapubic tenderness  Lymphatic: no LN palpable  Musculoskeletal: no muscle tenderness, no joint swelling or tenderness  Extremities: no pedal edema  Neurological/ Psychiatric: AxOx3, judgement and insight normal; moving all extremities  Skin: no rashes; no palpable lesions    Labs: all available labs reviewed                        9.9    26.52 )-----------( 138      ( 15 Dec 2022 06:19 )             31.4     -15    140  |  108  |  20  ----------------------------<  193<H>  4.4   |  24  |  0.84    Ca    8.6      15 Dec 2022 06:19  Phos  3.3     12-15  Mg     1.7     -15    TPro  5.6<L>  /  Alb  2.0<L>  /  TBili  0.2  /  DBili  x   /  AST  18  /  ALT  15  /  AlkPhos  116  12-15     LIVER FUNCTIONS - ( 15 Dec 2022 06:19 )  Alb: 2.0 g/dL / Pro: 5.6 gm/dL / ALK PHOS: 116 U/L / ALT: 15 U/L / AST: 18 U/L / GGT: x           COVID ( @ 10:56)  Detected    Radiology: all available radiological tests reviewed    < from: CT Chest No Cont (22 @ 15:00) >  1.  Left lower lobe consolidation suspicious for pneumonia. Other   multifocal findings in both lungs are also suggestive of infection. Mild   multifocal bronchiectasis. A repeat chest CT scan is recommended in 6   months to assess for resolution.    2.  Calcified fibrin sheath versus catheter fragment in the distal right   brachiocephalic vein    < end of copied text >      Advanced directives addressed: full resuscitation

## 2022-12-15 NOTE — DIETITIAN INITIAL EVALUATION ADULT - NSFNSPHYEXAMSKINFT_GEN_A_CORE
Carlo score 12  Pressure Injury 1: coccyx, Stage II  Pressure Injury 2: Right:,elbow, Unstageable  Pressure Injury 3: Right:,foot, Unstageable  Pressure Injury 4: Right:,heel, Stage II  Pressure Injury 5: Left:,heel, Stage II  Pressure Injury 6: Left:,elbow, Stage II

## 2022-12-16 LAB
ANION GAP SERPL CALC-SCNC: 6 MMOL/L — SIGNIFICANT CHANGE UP (ref 5–17)
BUN SERPL-MCNC: 18 MG/DL — SIGNIFICANT CHANGE UP (ref 7–23)
CALCIUM SERPL-MCNC: 8.5 MG/DL — SIGNIFICANT CHANGE UP (ref 8.5–10.1)
CHLORIDE SERPL-SCNC: 107 MMOL/L — SIGNIFICANT CHANGE UP (ref 96–108)
CO2 SERPL-SCNC: 26 MMOL/L — SIGNIFICANT CHANGE UP (ref 22–31)
CREAT SERPL-MCNC: 0.42 MG/DL — LOW (ref 0.5–1.3)
EGFR: 103 ML/MIN/1.73M2 — SIGNIFICANT CHANGE UP
GLUCOSE SERPL-MCNC: 140 MG/DL — HIGH (ref 70–99)
HCT VFR BLD CALC: 28.7 % — LOW (ref 34.5–45)
HGB BLD-MCNC: 9.1 G/DL — LOW (ref 11.5–15.5)
MAGNESIUM SERPL-MCNC: 1.8 MG/DL — SIGNIFICANT CHANGE UP (ref 1.6–2.6)
MCHC RBC-ENTMCNC: 22.9 PG — LOW (ref 27–34)
MCHC RBC-ENTMCNC: 31.7 GM/DL — LOW (ref 32–36)
MCV RBC AUTO: 72.1 FL — LOW (ref 80–100)
PHOSPHATE SERPL-MCNC: 2.3 MG/DL — LOW (ref 2.5–4.5)
PLATELET # BLD AUTO: 123 K/UL — LOW (ref 150–400)
POTASSIUM SERPL-MCNC: 3.3 MMOL/L — LOW (ref 3.5–5.3)
POTASSIUM SERPL-SCNC: 3.3 MMOL/L — LOW (ref 3.5–5.3)
RBC # BLD: 3.98 M/UL — SIGNIFICANT CHANGE UP (ref 3.8–5.2)
RBC # FLD: 17.4 % — HIGH (ref 10.3–14.5)
SODIUM SERPL-SCNC: 139 MMOL/L — SIGNIFICANT CHANGE UP (ref 135–145)
WBC # BLD: 18.41 K/UL — HIGH (ref 3.8–10.5)
WBC # FLD AUTO: 18.41 K/UL — HIGH (ref 3.8–10.5)

## 2022-12-16 PROCEDURE — 99233 SBSQ HOSP IP/OBS HIGH 50: CPT

## 2022-12-16 RX ORDER — POTASSIUM PHOSPHATE, MONOBASIC POTASSIUM PHOSPHATE, DIBASIC 236; 224 MG/ML; MG/ML
15 INJECTION, SOLUTION INTRAVENOUS ONCE
Refills: 0 | Status: COMPLETED | OUTPATIENT
Start: 2022-12-16 | End: 2022-12-16

## 2022-12-16 RX ADMIN — Medication 5 UNIT(S): at 13:54

## 2022-12-16 RX ADMIN — MEROPENEM 100 MILLIGRAM(S): 1 INJECTION INTRAVENOUS at 05:30

## 2022-12-16 RX ADMIN — APIXABAN 5 MILLIGRAM(S): 2.5 TABLET, FILM COATED ORAL at 10:55

## 2022-12-16 RX ADMIN — Medication 1 APPLICATION(S): at 13:55

## 2022-12-16 RX ADMIN — Medication 100 MILLIGRAM(S): at 14:25

## 2022-12-16 RX ADMIN — Medication 100 MILLIGRAM(S): at 21:32

## 2022-12-16 RX ADMIN — MEROPENEM 100 MILLIGRAM(S): 1 INJECTION INTRAVENOUS at 21:32

## 2022-12-16 RX ADMIN — FLUCONAZOLE 100 MILLIGRAM(S): 150 TABLET ORAL at 15:31

## 2022-12-16 RX ADMIN — INSULIN GLARGINE 18 UNIT(S): 100 INJECTION, SOLUTION SUBCUTANEOUS at 22:17

## 2022-12-16 RX ADMIN — Medication 12.5 MILLIGRAM(S): at 21:34

## 2022-12-16 RX ADMIN — MEXILETINE HYDROCHLORIDE 200 MILLIGRAM(S): 150 CAPSULE ORAL at 14:25

## 2022-12-16 RX ADMIN — APIXABAN 5 MILLIGRAM(S): 2.5 TABLET, FILM COATED ORAL at 21:33

## 2022-12-16 RX ADMIN — Medication 12.5 MILLIGRAM(S): at 10:55

## 2022-12-16 RX ADMIN — Medication 5 UNIT(S): at 09:13

## 2022-12-16 RX ADMIN — POTASSIUM PHOSPHATE, MONOBASIC POTASSIUM PHOSPHATE, DIBASIC 62.5 MILLIMOLE(S): 236; 224 INJECTION, SOLUTION INTRAVENOUS at 22:16

## 2022-12-16 RX ADMIN — MEXILETINE HYDROCHLORIDE 200 MILLIGRAM(S): 150 CAPSULE ORAL at 21:33

## 2022-12-16 RX ADMIN — Medication 1 TABLET(S): at 10:55

## 2022-12-16 RX ADMIN — MEXILETINE HYDROCHLORIDE 200 MILLIGRAM(S): 150 CAPSULE ORAL at 05:30

## 2022-12-16 RX ADMIN — CHLORHEXIDINE GLUCONATE 1 APPLICATION(S): 213 SOLUTION TOPICAL at 10:55

## 2022-12-16 RX ADMIN — MEROPENEM 100 MILLIGRAM(S): 1 INJECTION INTRAVENOUS at 14:24

## 2022-12-16 RX ADMIN — Medication 100 MILLIGRAM(S): at 05:30

## 2022-12-16 NOTE — PHARMACOTHERAPY INTERVENTION NOTE - COMMENTS
Medication history complete, patient is from [facility], reviewed and confirmed medication with  list provided by facility.
Modified penicillin allergy to state patient tolerated meropenem during this admission.    Noah De La Cruz, PharmD  Clinical Pharmacy Specialist, Infectious Diseases  Tele-Antimicrobial Stewardship Program (Tele-ASP)  Tele-ASP Phone: [phone]

## 2022-12-16 NOTE — ADVANCED PRACTICE NURSE CONSULT - ASSESSMENT
This is a 72 year old female admitted to the hospital on 12/14/2022 from [facility] for worsening SOB  . PMHx of adrenal insufficiency, aortic insufficiency, asthma, Afib on Eliquis, colorectal cancer s/p resection with colostomy bag, COPD, DM, DVT, pelvic fracture, rectal bleeding, seizure, TIA, tracheobronchomalacia, type I aortic dissection s/p Bentall procedure and hemiarch replacement 9/6 with Dr. Cabrera. Patient recently admitted for COVID and RDV Tx at [facility].    Assessed patient in CCU and patient on Isolation for COVID. Patient consented to my assessment and recommendations.   Patient is laying on a Total Care Sport mattress with Heels elevated off mattress with Pillow. She refused to have me place on total CAIR boots.   Podiatry following wounds to feet and orders are placed.   Carlo 12  Nutrition: Pureed Consistent Carb with Glucerna shake with each meal.    Right Elbow with 3.1cm x 1.9cm x 0.1cm wound with 100% pink viable tissue to wound bed. Noted white drainage with no odor. Periwound intact.  For topical therapy recommend cleaning with Normal Saline and PAT dry, apply silvadene two times per day and cover nonwoven gauze and secure with tape.   Sacrum/Coccyx: Noted a 1.1 cm x 0.6cm hyperpigmented skin with mild blachable erythema and noted a small section of skin peeling. This can be classified as a stage 2 pressure injury present on admission. Noted in patients history she has had a deep tissue injury. Recommendation to Apply Triad cream and continue to turn and position. Keep 1 Purple pad under patient to wick away moisture and apply No sting barrier to bilateral gluteal and groin to protect skin from urine. Recommend not using a diaper while in bed.

## 2022-12-16 NOTE — ADVANCED PRACTICE NURSE CONSULT - RECOMMEDATIONS
-Right elbow wound - cleanse with NS, pat dry, apply silvadene ointment BID cover with nonwoven gauze, secure with tape  - Maintain patient on low air loss surface  - routine ostomy management  -glycemic control .    ·Recommendation: ·Turn and Reposition Q2H  ·Offload sacral-coccygeal area with positioner pillow, alternate sides Q2H,   ·Incontinence care with repositioning Q2H  -Sacrum:  Cleanse with NS, pat dry,  apply TRIAD (Zinc-oxide barrier paste) BID and PRN for soiling: It is alright to leave a thin layer of cream on the skin after cleansing as this will not impede wound healing  -Continue turning/positioning patient from side-to-side q2h while in bed, q1h when/if OOB chair, or in accordance w/ pt's plan of care. Utilize pillows and/or z-zeke fluidized positioner or Zeke-Form pillow to assist w/ turning/positioning. When/if OOB chair, utilize pillows or air waffle chair cushion to offload pressure. If patient a fall risk ensure chair alarm is on and working. Apply Antiskid mat under chair alarm to prevent patient from sliding off chair.   -Continue to offload heels from bed surface with soft pillow under calfs or by applying offloading boots to BLEs. May use foam boots or Total CAIR boot   -Continue utilizing one underpad underneath patient to contain incontinence episodes; change pad when saturated/soiled. For profuse incontinence use 1 Purple pad under patient accept and wick away moisture.   -Consider utilizing  Female External Device  (if patient candidate) to contain any urinary incontinence. (Please ensure placement and correct suction for external female device follow guidelines   -Continue nutrition consult for optimal wound healing & nutritional status and add ProGelatin with meals  -Assess skin/wound qshift, report changes to primary provider.   -No Diapering

## 2022-12-17 LAB
ALBUMIN SERPL ELPH-MCNC: 2.2 G/DL — LOW (ref 3.3–5)
ALP SERPL-CCNC: 82 U/L — SIGNIFICANT CHANGE UP (ref 40–120)
ALT FLD-CCNC: 25 U/L — SIGNIFICANT CHANGE UP (ref 12–78)
ANION GAP SERPL CALC-SCNC: 6 MMOL/L — SIGNIFICANT CHANGE UP (ref 5–17)
AST SERPL-CCNC: 20 U/L — SIGNIFICANT CHANGE UP (ref 15–37)
BILIRUB SERPL-MCNC: 0.3 MG/DL — SIGNIFICANT CHANGE UP (ref 0.2–1.2)
BUN SERPL-MCNC: 13 MG/DL — SIGNIFICANT CHANGE UP (ref 7–23)
CALCIUM SERPL-MCNC: 9 MG/DL — SIGNIFICANT CHANGE UP (ref 8.5–10.1)
CHLORIDE SERPL-SCNC: 104 MMOL/L — SIGNIFICANT CHANGE UP (ref 96–108)
CO2 SERPL-SCNC: 28 MMOL/L — SIGNIFICANT CHANGE UP (ref 22–31)
CREAT SERPL-MCNC: 0.45 MG/DL — LOW (ref 0.5–1.3)
EGFR: 101 ML/MIN/1.73M2 — SIGNIFICANT CHANGE UP
GLUCOSE SERPL-MCNC: 125 MG/DL — HIGH (ref 70–99)
HCT VFR BLD CALC: 36.7 % — SIGNIFICANT CHANGE UP (ref 34.5–45)
HGB BLD-MCNC: 11.1 G/DL — LOW (ref 11.5–15.5)
MCHC RBC-ENTMCNC: 22.2 PG — LOW (ref 27–34)
MCHC RBC-ENTMCNC: 30.2 GM/DL — LOW (ref 32–36)
MCV RBC AUTO: 73.5 FL — LOW (ref 80–100)
PLATELET # BLD AUTO: 130 K/UL — LOW (ref 150–400)
POTASSIUM SERPL-MCNC: 3.4 MMOL/L — LOW (ref 3.5–5.3)
POTASSIUM SERPL-SCNC: 3.4 MMOL/L — LOW (ref 3.5–5.3)
PROT SERPL-MCNC: 6.2 GM/DL — SIGNIFICANT CHANGE UP (ref 6–8.3)
RBC # BLD: 4.99 M/UL — SIGNIFICANT CHANGE UP (ref 3.8–5.2)
RBC # FLD: 17.6 % — HIGH (ref 10.3–14.5)
SODIUM SERPL-SCNC: 138 MMOL/L — SIGNIFICANT CHANGE UP (ref 135–145)
WBC # BLD: 12.55 K/UL — HIGH (ref 3.8–10.5)
WBC # FLD AUTO: 12.55 K/UL — HIGH (ref 3.8–10.5)

## 2022-12-17 PROCEDURE — 99233 SBSQ HOSP IP/OBS HIGH 50: CPT

## 2022-12-17 RX ORDER — ONDANSETRON 8 MG/1
4 TABLET, FILM COATED ORAL EVERY 8 HOURS
Refills: 0 | Status: DISCONTINUED | OUTPATIENT
Start: 2022-12-17 | End: 2022-12-22

## 2022-12-17 RX ORDER — HYDROCORTISONE 20 MG
50 TABLET ORAL EVERY 8 HOURS
Refills: 0 | Status: DISCONTINUED | OUTPATIENT
Start: 2022-12-17 | End: 2022-12-19

## 2022-12-17 RX ORDER — LACTULOSE 10 G/15ML
10 SOLUTION ORAL
Refills: 0 | Status: DISCONTINUED | OUTPATIENT
Start: 2022-12-17 | End: 2022-12-22

## 2022-12-17 RX ADMIN — Medication 1 APPLICATION(S): at 14:24

## 2022-12-17 RX ADMIN — FLUCONAZOLE 100 MILLIGRAM(S): 150 TABLET ORAL at 14:54

## 2022-12-17 RX ADMIN — MEROPENEM 100 MILLIGRAM(S): 1 INJECTION INTRAVENOUS at 05:56

## 2022-12-17 RX ADMIN — CHLORHEXIDINE GLUCONATE 1 APPLICATION(S): 213 SOLUTION TOPICAL at 05:55

## 2022-12-17 RX ADMIN — APIXABAN 5 MILLIGRAM(S): 2.5 TABLET, FILM COATED ORAL at 22:15

## 2022-12-17 RX ADMIN — Medication 100 MILLIGRAM(S): at 05:55

## 2022-12-17 RX ADMIN — MEXILETINE HYDROCHLORIDE 200 MILLIGRAM(S): 150 CAPSULE ORAL at 05:56

## 2022-12-17 RX ADMIN — Medication 2: at 12:30

## 2022-12-17 RX ADMIN — Medication 5 UNIT(S): at 17:39

## 2022-12-17 RX ADMIN — Medication 1 TABLET(S): at 09:38

## 2022-12-17 RX ADMIN — LACTULOSE 10 GRAM(S): 10 SOLUTION ORAL at 14:49

## 2022-12-17 RX ADMIN — Medication 50 MILLIGRAM(S): at 22:15

## 2022-12-17 RX ADMIN — Medication 100 MILLIGRAM(S): at 14:49

## 2022-12-17 RX ADMIN — MEROPENEM 100 MILLIGRAM(S): 1 INJECTION INTRAVENOUS at 22:15

## 2022-12-17 RX ADMIN — Medication 5 UNIT(S): at 12:30

## 2022-12-17 RX ADMIN — Medication 12.5 MILLIGRAM(S): at 11:00

## 2022-12-17 RX ADMIN — APIXABAN 5 MILLIGRAM(S): 2.5 TABLET, FILM COATED ORAL at 09:38

## 2022-12-17 RX ADMIN — Medication 4: at 17:39

## 2022-12-17 RX ADMIN — INSULIN GLARGINE 18 UNIT(S): 100 INJECTION, SOLUTION SUBCUTANEOUS at 22:15

## 2022-12-17 RX ADMIN — ONDANSETRON 4 MILLIGRAM(S): 8 TABLET, FILM COATED ORAL at 22:15

## 2022-12-17 RX ADMIN — MEROPENEM 100 MILLIGRAM(S): 1 INJECTION INTRAVENOUS at 14:53

## 2022-12-17 NOTE — PHYSICAL THERAPY INITIAL EVALUATION ADULT - NSACTIVITYREC_GEN_A_PT
Maximal Assistance Lift, such as Lidia, Cintia Flex or Cintia Stedy, is recommended for OOB transfers for safe staff and patient handling.

## 2022-12-17 NOTE — PHYSICAL THERAPY INITIAL EVALUATION ADULT - PLANNED THERAPY INTERVENTIONS, PT EVAL
today: gait training x 12 min, therapeutic exercises x 12 min/bed mobility training/gait training/transfer training

## 2022-12-17 NOTE — CHART NOTE - NSCHARTNOTEFT_GEN_A_CORE
12/15/22 consult inappropriate as RD already consulted on 12/14/22, see full evaluation and dx for PCM in chart on 12/15/22.     RDN will continue to monitor PO intake, labs, hydration, and wt prn.   Sangeetha Kahn MS, RDN, CDN, FAND [phone]

## 2022-12-17 NOTE — PHYSICAL THERAPY INITIAL EVALUATION ADULT - NSPTDISCHREC_GEN_A_CORE
Patient would benefit from continued physical therapy  services to reach maximal potential in a Sub Acute Rehab facility/Sub-acute Rehab

## 2022-12-17 NOTE — PHYSICAL THERAPY INITIAL EVALUATION ADULT - PERTINENT HX OF CURRENT PROBLEM, REHAB EVAL
71 yo F admitted from [facility] with hypoxia. (+)  Admitted to CCU, on high flow O2,  now downgraded to med/surg, on RA.  Recent admit to [facility] for N/V 11/2022 and (+) for CV-19, was able to ambulate 15 feet and was discharged to [facility].

## 2022-12-17 NOTE — PHYSICAL THERAPY INITIAL EVALUATION ADULT - PATIENT PROFILE REVIEW, REHAB EVAL
PMHx of adrenal insufficiency, aortic insufficiency, asthma, Afib on Eliquis, colorectal cancer s/p resection with colostomy bag, COPD, DM, DVT, pelvic fracture, rectal bleeding, seizure, TIA, tracheobronchomalacia, type I aortic dissection s/p Bentall procedure and hemiarch replacement 9/6 with Dr. Cabrera./yes

## 2022-12-17 NOTE — PHYSICAL THERAPY INITIAL EVALUATION ADULT - GENERAL OBSERVATIONS, REHAB EVAL
Patient received in bed in CCU, +ICU monitors, No O2 in use, sats > 92% throughout session. +PF and adult diaper in use. Patient c/o B foot pain only if touched. H/O wounds on B heels.

## 2022-12-17 NOTE — PHYSICAL THERAPY INITIAL EVALUATION ADULT - MODALITIES TREATMENT COMMENTS
Patient very weak and deconditioned.  Refused out of bed to chair or attempting steps c/o "my diaper is wet". Patient returned to bed by request, B heels of loaded, call bell in reach and bed alarm active. RN informed of session/status.

## 2022-12-18 PROCEDURE — 99233 SBSQ HOSP IP/OBS HIGH 50: CPT

## 2022-12-18 RX ADMIN — Medication 5 UNIT(S): at 08:37

## 2022-12-18 RX ADMIN — MEROPENEM 100 MILLIGRAM(S): 1 INJECTION INTRAVENOUS at 21:32

## 2022-12-18 RX ADMIN — Medication 5 UNIT(S): at 17:45

## 2022-12-18 RX ADMIN — Medication 50 MILLIGRAM(S): at 06:17

## 2022-12-18 RX ADMIN — MEXILETINE HYDROCHLORIDE 200 MILLIGRAM(S): 150 CAPSULE ORAL at 13:36

## 2022-12-18 RX ADMIN — MEXILETINE HYDROCHLORIDE 200 MILLIGRAM(S): 150 CAPSULE ORAL at 06:17

## 2022-12-18 RX ADMIN — FLUCONAZOLE 100 MILLIGRAM(S): 150 TABLET ORAL at 13:42

## 2022-12-18 RX ADMIN — Medication 12.5 MILLIGRAM(S): at 10:55

## 2022-12-18 RX ADMIN — Medication 50 MILLIGRAM(S): at 13:35

## 2022-12-18 RX ADMIN — LACTULOSE 10 GRAM(S): 10 SOLUTION ORAL at 10:59

## 2022-12-18 RX ADMIN — Medication 50 MILLIGRAM(S): at 21:33

## 2022-12-18 RX ADMIN — MEXILETINE HYDROCHLORIDE 200 MILLIGRAM(S): 150 CAPSULE ORAL at 21:33

## 2022-12-18 RX ADMIN — Medication 5 UNIT(S): at 11:26

## 2022-12-18 RX ADMIN — Medication 12.5 MILLIGRAM(S): at 21:33

## 2022-12-18 RX ADMIN — MEROPENEM 100 MILLIGRAM(S): 1 INJECTION INTRAVENOUS at 13:35

## 2022-12-18 RX ADMIN — APIXABAN 5 MILLIGRAM(S): 2.5 TABLET, FILM COATED ORAL at 10:56

## 2022-12-18 RX ADMIN — MEROPENEM 100 MILLIGRAM(S): 1 INJECTION INTRAVENOUS at 06:17

## 2022-12-18 RX ADMIN — Medication 1 TABLET(S): at 10:57

## 2022-12-18 RX ADMIN — APIXABAN 5 MILLIGRAM(S): 2.5 TABLET, FILM COATED ORAL at 22:34

## 2022-12-18 RX ADMIN — Medication 1 APPLICATION(S): at 11:25

## 2022-12-19 PROCEDURE — 99233 SBSQ HOSP IP/OBS HIGH 50: CPT

## 2022-12-19 RX ORDER — ALBUTEROL 90 UG/1
2.5 AEROSOL, METERED ORAL EVERY 6 HOURS
Refills: 0 | Status: DISCONTINUED | OUTPATIENT
Start: 2022-12-19 | End: 2022-12-22

## 2022-12-19 RX ORDER — HYDROCORTISONE 20 MG
25 TABLET ORAL EVERY 8 HOURS
Refills: 0 | Status: DISCONTINUED | OUTPATIENT
Start: 2022-12-19 | End: 2022-12-20

## 2022-12-19 RX ADMIN — MEXILETINE HYDROCHLORIDE 200 MILLIGRAM(S): 150 CAPSULE ORAL at 14:02

## 2022-12-19 RX ADMIN — Medication 5 UNIT(S): at 12:55

## 2022-12-19 RX ADMIN — Medication 12.5 MILLIGRAM(S): at 22:35

## 2022-12-19 RX ADMIN — Medication 4: at 08:32

## 2022-12-19 RX ADMIN — MEROPENEM 100 MILLIGRAM(S): 1 INJECTION INTRAVENOUS at 22:34

## 2022-12-19 RX ADMIN — Medication 1 TABLET(S): at 11:03

## 2022-12-19 RX ADMIN — MEROPENEM 100 MILLIGRAM(S): 1 INJECTION INTRAVENOUS at 05:12

## 2022-12-19 RX ADMIN — MEROPENEM 100 MILLIGRAM(S): 1 INJECTION INTRAVENOUS at 14:02

## 2022-12-19 RX ADMIN — Medication 25 MILLIGRAM(S): at 22:35

## 2022-12-19 RX ADMIN — Medication 12.5 MILLIGRAM(S): at 11:03

## 2022-12-19 RX ADMIN — Medication 4: at 12:55

## 2022-12-19 RX ADMIN — APIXABAN 5 MILLIGRAM(S): 2.5 TABLET, FILM COATED ORAL at 11:03

## 2022-12-19 RX ADMIN — Medication 1 APPLICATION(S): at 16:01

## 2022-12-19 RX ADMIN — Medication 50 MILLIGRAM(S): at 05:13

## 2022-12-19 RX ADMIN — INSULIN GLARGINE 18 UNIT(S): 100 INJECTION, SOLUTION SUBCUTANEOUS at 22:46

## 2022-12-19 RX ADMIN — MEXILETINE HYDROCHLORIDE 200 MILLIGRAM(S): 150 CAPSULE ORAL at 05:12

## 2022-12-19 RX ADMIN — MEXILETINE HYDROCHLORIDE 200 MILLIGRAM(S): 150 CAPSULE ORAL at 22:34

## 2022-12-19 RX ADMIN — Medication 5 UNIT(S): at 08:32

## 2022-12-19 RX ADMIN — Medication 5 UNIT(S): at 17:33

## 2022-12-19 RX ADMIN — APIXABAN 5 MILLIGRAM(S): 2.5 TABLET, FILM COATED ORAL at 22:34

## 2022-12-19 RX ADMIN — Medication 50 MILLIGRAM(S): at 14:02

## 2022-12-19 RX ADMIN — ALBUTEROL 2.5 MILLIGRAM(S): 90 AEROSOL, METERED ORAL at 21:23

## 2022-12-19 RX ADMIN — FLUCONAZOLE 100 MILLIGRAM(S): 150 TABLET ORAL at 14:02

## 2022-12-20 PROCEDURE — 99232 SBSQ HOSP IP/OBS MODERATE 35: CPT

## 2022-12-20 RX ADMIN — Medication 5 UNIT(S): at 16:55

## 2022-12-20 RX ADMIN — ALBUTEROL 2.5 MILLIGRAM(S): 90 AEROSOL, METERED ORAL at 14:10

## 2022-12-20 RX ADMIN — MEXILETINE HYDROCHLORIDE 200 MILLIGRAM(S): 150 CAPSULE ORAL at 13:50

## 2022-12-20 RX ADMIN — Medication 1 TABLET(S): at 10:21

## 2022-12-20 RX ADMIN — MEXILETINE HYDROCHLORIDE 200 MILLIGRAM(S): 150 CAPSULE ORAL at 21:23

## 2022-12-20 RX ADMIN — MEXILETINE HYDROCHLORIDE 200 MILLIGRAM(S): 150 CAPSULE ORAL at 05:54

## 2022-12-20 RX ADMIN — FLUCONAZOLE 100 MILLIGRAM(S): 150 TABLET ORAL at 14:43

## 2022-12-20 RX ADMIN — ALBUTEROL 2.5 MILLIGRAM(S): 90 AEROSOL, METERED ORAL at 08:34

## 2022-12-20 RX ADMIN — Medication 25 MILLIGRAM(S): at 13:45

## 2022-12-20 RX ADMIN — APIXABAN 5 MILLIGRAM(S): 2.5 TABLET, FILM COATED ORAL at 10:21

## 2022-12-20 RX ADMIN — Medication 25 MILLIGRAM(S): at 05:54

## 2022-12-20 RX ADMIN — Medication 4: at 12:04

## 2022-12-20 RX ADMIN — APIXABAN 5 MILLIGRAM(S): 2.5 TABLET, FILM COATED ORAL at 21:24

## 2022-12-20 RX ADMIN — INSULIN GLARGINE 18 UNIT(S): 100 INJECTION, SOLUTION SUBCUTANEOUS at 21:23

## 2022-12-20 RX ADMIN — Medication 5 UNIT(S): at 08:05

## 2022-12-20 RX ADMIN — MEROPENEM 100 MILLIGRAM(S): 1 INJECTION INTRAVENOUS at 05:54

## 2022-12-20 RX ADMIN — PANTOPRAZOLE SODIUM 40 MILLIGRAM(S): 20 TABLET, DELAYED RELEASE ORAL at 10:21

## 2022-12-20 RX ADMIN — Medication 2: at 16:55

## 2022-12-20 RX ADMIN — MEROPENEM 100 MILLIGRAM(S): 1 INJECTION INTRAVENOUS at 13:50

## 2022-12-20 RX ADMIN — Medication 12.5 MILLIGRAM(S): at 21:23

## 2022-12-20 RX ADMIN — CHLORHEXIDINE GLUCONATE 1 APPLICATION(S): 213 SOLUTION TOPICAL at 10:18

## 2022-12-20 RX ADMIN — ALBUTEROL 2.5 MILLIGRAM(S): 90 AEROSOL, METERED ORAL at 20:08

## 2022-12-20 RX ADMIN — Medication 2: at 08:04

## 2022-12-20 RX ADMIN — Medication 12.5 MILLIGRAM(S): at 10:21

## 2022-12-20 RX ADMIN — Medication 5 UNIT(S): at 12:04

## 2022-12-20 RX ADMIN — MEROPENEM 100 MILLIGRAM(S): 1 INJECTION INTRAVENOUS at 21:24

## 2022-12-20 RX ADMIN — Medication 1 APPLICATION(S): at 14:14

## 2022-12-21 ENCOUNTER — APPOINTMENT (OUTPATIENT)
Dept: VASCULAR SURGERY | Facility: CLINIC | Age: 72
End: 2022-12-21

## 2022-12-21 LAB
GLUCOSE BLDC GLUCOMTR-MCNC: 180 MG/DL — HIGH (ref 70–99)
GLUCOSE BLDC GLUCOMTR-MCNC: 248 MG/DL — HIGH (ref 70–99)

## 2022-12-21 PROCEDURE — 99232 SBSQ HOSP IP/OBS MODERATE 35: CPT

## 2022-12-21 RX ORDER — SODIUM CHLORIDE 9 MG/ML
500 INJECTION INTRAMUSCULAR; INTRAVENOUS; SUBCUTANEOUS ONCE
Refills: 0 | Status: COMPLETED | OUTPATIENT
Start: 2022-12-21 | End: 2022-12-21

## 2022-12-21 RX ORDER — FLUCONAZOLE 150 MG/1
400 TABLET ORAL EVERY 24 HOURS
Refills: 0 | Status: DISCONTINUED | OUTPATIENT
Start: 2022-12-21 | End: 2022-12-22

## 2022-12-21 RX ORDER — INFLUENZA VIRUS VACCINE 15; 15; 15; 15 UG/.5ML; UG/.5ML; UG/.5ML; UG/.5ML
0.7 SUSPENSION INTRAMUSCULAR ONCE
Refills: 0 | Status: DISCONTINUED | OUTPATIENT
Start: 2022-12-21 | End: 2022-12-22

## 2022-12-21 RX ADMIN — ALBUTEROL 2.5 MILLIGRAM(S): 90 AEROSOL, METERED ORAL at 20:18

## 2022-12-21 RX ADMIN — Medication 5 UNIT(S): at 11:29

## 2022-12-21 RX ADMIN — Medication 5 UNIT(S): at 16:41

## 2022-12-21 RX ADMIN — ALBUTEROL 2.5 MILLIGRAM(S): 90 AEROSOL, METERED ORAL at 13:52

## 2022-12-21 RX ADMIN — Medication 8 MILLIGRAM(S): at 09:37

## 2022-12-21 RX ADMIN — APIXABAN 5 MILLIGRAM(S): 2.5 TABLET, FILM COATED ORAL at 09:38

## 2022-12-21 RX ADMIN — CHLORHEXIDINE GLUCONATE 1 APPLICATION(S): 213 SOLUTION TOPICAL at 11:57

## 2022-12-21 RX ADMIN — Medication 2: at 11:29

## 2022-12-21 RX ADMIN — MEROPENEM 100 MILLIGRAM(S): 1 INJECTION INTRAVENOUS at 22:29

## 2022-12-21 RX ADMIN — Medication 1 TABLET(S): at 09:38

## 2022-12-21 RX ADMIN — Medication 2: at 16:41

## 2022-12-21 RX ADMIN — Medication 5 UNIT(S): at 07:53

## 2022-12-21 RX ADMIN — FLUCONAZOLE 400 MILLIGRAM(S): 150 TABLET ORAL at 14:13

## 2022-12-21 RX ADMIN — MEROPENEM 100 MILLIGRAM(S): 1 INJECTION INTRAVENOUS at 14:13

## 2022-12-21 RX ADMIN — MEXILETINE HYDROCHLORIDE 200 MILLIGRAM(S): 150 CAPSULE ORAL at 14:13

## 2022-12-21 RX ADMIN — APIXABAN 5 MILLIGRAM(S): 2.5 TABLET, FILM COATED ORAL at 22:28

## 2022-12-21 RX ADMIN — Medication 1 APPLICATION(S): at 11:57

## 2022-12-21 RX ADMIN — Medication 12.5 MILLIGRAM(S): at 09:38

## 2022-12-21 RX ADMIN — INSULIN GLARGINE 18 UNIT(S): 100 INJECTION, SOLUTION SUBCUTANEOUS at 22:28

## 2022-12-21 RX ADMIN — MEXILETINE HYDROCHLORIDE 200 MILLIGRAM(S): 150 CAPSULE ORAL at 05:47

## 2022-12-21 RX ADMIN — MEXILETINE HYDROCHLORIDE 200 MILLIGRAM(S): 150 CAPSULE ORAL at 22:28

## 2022-12-21 RX ADMIN — MEROPENEM 100 MILLIGRAM(S): 1 INJECTION INTRAVENOUS at 05:48

## 2022-12-21 RX ADMIN — PANTOPRAZOLE SODIUM 40 MILLIGRAM(S): 20 TABLET, DELAYED RELEASE ORAL at 05:47

## 2022-12-21 NOTE — PROGRESS NOTE ADULT - PROBLEM SELECTOR PROBLEM 2
Sepsis
2/2 dementia. Family reports variable PO intake prior to hospitalization. Patient with albumin 2.0, + generalized weakness, requires assistance with ADLs. Encourage PO intake.
Sepsis

## 2022-12-21 NOTE — PROGRESS NOTE ADULT - PROBLEM SELECTOR PROBLEM 3
Lactate blood increase

## 2022-12-21 NOTE — PROGRESS NOTE ADULT - SUBJECTIVE AND OBJECTIVE BOX
PMHx of adrenal insufficiency, aortic insufficiency, asthma, Afib on Eliquis, colorectal cancer s/p resection with colostomy bag, COPD, DM, DVT, pelvic fracture, rectal bleeding, seizure, TIA, tracheobronchomalacia, type I aortic dissection s/p Bentall procedure and hemiarch replacement 9/6 with Dr. Cabrera [facility] since last [facility]. [facility] and sent to the ED for evaluation.  -treated for sepsis due to Pna     12/15 Clinically improved on high flow. Case discussed on CCU rounds and with daughter.  12/16  Clinically stable, Case discussed on CCU rounds. For transfer today and PT.  12.17: +deep productive coughing, no cp  12.18: still coughing, had BM  12.19: deep productive coughing, has difficulty mobilizing secretions   12.20: ++cough, no cp, no sob, no n/v/d  12.21: still coughing but improved , no n/v/d     REVIEW OF SYSTEMS:    CONSTITUTIONAL: No weakness, No fevers or chills  ENT: No ear ache, No sorethroat  NECK: No pain, No stiffness  RESPIRATORY: ++cough, No wheezing, No hemoptysis; No dyspnea  CARDIOVASCULAR: No chest pain, No palpitations  GASTROINTESTINAL: No abd pain, No nausea, No vomiting, No hematemesis, No diarrhea or constipation. No melena, No hematochezia.  GENITOURINARY: No dysuria, No  hematuria  NEUROLOGICAL: No diplopia, No paresthesia, No motor dysfunction  MUSCULOSKELETAL: No arthralgia, No myalgia  SKIN: No rashes, or lesions   PSYCH: no anxiety, no suicidal ideation    All other review of systems is negative unless indicated above    Vital Signs Last 24 Hrs  T(C): 37.2 (21 Dec 2022 08:44), Max: 37.2 (21 Dec 2022 08:44)  T(F): 99 (21 Dec 2022 08:44), Max: 99 (21 Dec 2022 08:44)  HR: 63 (21 Dec 2022 08:44) (56 - 86)  BP: 128/69 (21 Dec 2022 08:44) (124/58 - 131/69)  RR: 18 (21 Dec 2022 08:44) (16 - 18)  SpO2: 100% (21 Dec 2022 08:44) (94% - 100%)    Parameters below as of 21 Dec 2022 10:57  Patient On (Oxygen Delivery Method): room air            PHYSICAL EXAM:    GENERAL: NAD  HEENT:  NC/AT, EOMI, PERRLA, No scleral icterus, Moist mucous membranes  NECK: Supple, No JVD  CNS:  Alert & Oriented X3, Motor Strength 5/5 B/L upper and lower extremities; DTRs 2+ intact   LUNG: Decreased Breath sounds, Clear to auscultation bilaterally, +rales, + rhonchi, No wheezing  HEART: RRR; No murmurs, No rubs  ABDOMEN: +BS, ST/ND/NT, +colostomy with feces   GENITOURINARY: Voiding, Bladder not distended  EXTREMITIES:  2+ Peripheral Pulses, No clubbing, No cyanosis, No tibial edema  MUSCULOSKELTAL: Joints normal ROM, No TTP, No effusion  VAGINAL: deferred  SKIN: no rashes  RECTAL: deferred, not indicated  BREAST: deferred                          11.1   12.55 )-----------( 130      ( 17 Dec 2022 06:58 )             36.7     12-17    138  |  104  |  13  ----------------------------<  125<H>  3.4<L>   |  28  |  0.45<L>    Ca    9.0      17 Dec 2022 06:58  Phos  2.3     12-16  Mg     1.8     12-16    TPro  6.2  /  Alb  2.2<L>  /  TBili  0.3  /  DBili  x   /  AST  20  /  ALT  25  /  AlkPhos  82  12-17    Vancomycin levels:   Cultures:     MEDICATIONS  (STANDING):  albuterol    0.083% 2.5 milliGRAM(s) Nebulizer every 6 hours  albuterol/ipratropium for Nebulization 3 milliLiter(s) Nebulizer every 20 minutes  apixaban 5 milliGRAM(s) Oral two times a day  chlorhexidine 4% Liquid 1 Application(s) Topical <User Schedule>  fluconAZOLE   Tablet 400 milliGRAM(s) Oral every 24 hours  glucagon  Injectable 1 milliGRAM(s) IntraMuscular once  influenza  Vaccine (HIGH DOSE) 0.7 milliLiter(s) IntraMuscular once  insulin glargine Injectable (LANTUS) 18 Unit(s) SubCutaneous at bedtime  insulin lispro (ADMELOG) corrective regimen sliding scale   SubCutaneous three times a day before meals  insulin lispro Injectable (ADMELOG) 5 Unit(s) SubCutaneous before breakfast  insulin lispro Injectable (ADMELOG) 5 Unit(s) SubCutaneous before lunch  insulin lispro Injectable (ADMELOG) 5 Unit(s) SubCutaneous before dinner  meropenem  IVPB 1000 milliGRAM(s) IV Intermittent every 8 hours  methylPREDNISolone 8 milliGRAM(s) Oral daily  metoprolol tartrate 12.5 milliGRAM(s) Oral two times a day  mexiletine 200 milliGRAM(s) Oral every 8 hours  pantoprazole    Tablet 40 milliGRAM(s) Oral before breakfast  silver sulfADIAZINE 1% Cream 1 Application(s) Topical daily  trimethoprim  160 mG/sulfamethoxazole 800 mG 1 Tablet(s) Oral daily          a/p:    1. Sepsis POA and Acute resp failure due to Pna:  resolving   c/w supplemental O2    2. Recurrent Pna:  c/w Meropenem IV Tid  day#6/7; c/w one more day of IV Abx per ID  Albuterol, Mucinex, Chest PT    h/o Fungemia: on Fluconazole po daily per ID    c/w Bactrim prophylaxis     3. Adrenal Insufficiency   c/w Medrol outpatient dose       4. DM type II    5. PAfib:  c/w Apixaban  c/w Lopressor , Mexiletine     dc tomorrow to [facility] 
PMHx of adrenal insufficiency, aortic insufficiency, asthma, Afib on Eliquis, colorectal cancer s/p resection with colostomy bag, COPD, DM, DVT, pelvic fracture, rectal bleeding, seizure, TIA, tracheobronchomalacia, type I aortic dissection s/p Bentall procedure and hemiarch replacement 9/6 with Dr. Cabrera presents to the ED BIBA from [facility] for worsening SOB since last night. Pt was found to be hypoxic at [facility] and sent to the ED for evaluation.    12/15 Clinically improved on high flow. Case discussed on CCU rounds and with daughter.  12/16  Clinically stable, Case discussed on CCU rounds. For transfer today and PT.  12.17: +deep productive coughing, no cp  12.18: still coughing, had BM      REVIEW OF SYSTEMS:    CONSTITUTIONAL: No weakness, No fevers or chills  ENT: No ear ache, No sorethroat  NECK: No pain, No stiffness  RESPIRATORY: ++cough, No wheezing, No hemoptysis; No dyspnea  CARDIOVASCULAR: No chest pain, No palpitations  GASTROINTESTINAL: No abd pain, No nausea, No vomiting, No hematemesis, No diarrhea or constipation. No melena, No hematochezia.  GENITOURINARY: No dysuria, No  hematuria  NEUROLOGICAL: No diplopia, No paresthesia, No motor dysfunction  MUSCULOSKELETAL: No arthralgia, No myalgia  SKIN: No rashes, or lesions   PSYCH: no anxiety, no suicidal ideation    All other review of systems is negative unless indicated above    Vital Signs Last 24 Hrs  T(C): 36 (17 Dec 2022 05:00), Max: 36 (17 Dec 2022 05:00)  T(F): 96.8 (17 Dec 2022 05:00), Max: 96.8 (17 Dec 2022 05:00)  HR: 52 (17 Dec 2022 16:00) (43 - 58)  BP: 159/68 (17 Dec 2022 16:00) (135/75 - 179/75)  BP(mean): 92 (17 Dec 2022 16:00) (81 - 101)  RR: 14 (17 Dec 2022 16:00) (10 - 17)  SpO2: 94% (17 Dec 2022 16:00) (92% - 100%)    Parameters below as of 17 Dec 2022 09:25  Patient On (Oxygen Delivery Method): nasal cannula  O2 Flow (L/min): 2      PHYSICAL EXAM:    GENERAL: NAD  HEENT:  NC/AT, EOMI, PERRLA, No scleral icterus, Moist mucous membranes  NECK: Supple, No JVD  CNS:  Alert & Oriented X3, Motor Strength 5/5 B/L upper and lower extremities; DTRs 2+ intact   LUNG: Normal Breath sounds, Clear to auscultation bilaterally, No rales, No rhonchi, No wheezing  HEART: RRR; No murmurs, No rubs  ABDOMEN: +BS, ST/ND/NT, +colostomy with feces   GENITOURINARY: Voiding, Bladder not distended  EXTREMITIES:  2+ Peripheral Pulses, No clubbing, No cyanosis, No tibial edema  MUSCULOSKELTAL: Joints normal ROM, No TTP, No effusion  VAGINAL: deferred  SKIN: no rashes  RECTAL: deferred, not indicated  BREAST: deferred                          11.1   12.55 )-----------( 130      ( 17 Dec 2022 06:58 )             36.7     12-17    138  |  104  |  13  ----------------------------<  125<H>  3.4<L>   |  28  |  0.45<L>    Ca    9.0      17 Dec 2022 06:58  Phos  2.3     12-16  Mg     1.8     12-16    TPro  6.2  /  Alb  2.2<L>  /  TBili  0.3  /  DBili  x   /  AST  20  /  ALT  25  /  AlkPhos  82  12-17    Vancomycin levels:   Cultures:     MEDICATIONS  (STANDING):  albuterol/ipratropium for Nebulization 3 milliLiter(s) Nebulizer every 20 minutes  apixaban 5 milliGRAM(s) Oral two times a day  chlorhexidine 4% Liquid 1 Application(s) Topical <User Schedule>  fluconAZOLE IVPB 400 milliGRAM(s) IV Intermittent every 24 hours  glucagon  Injectable 1 milliGRAM(s) IntraMuscular once  hydrocortisone sodium succinate Injectable 100 milliGRAM(s) IV Push every 8 hours  insulin glargine Injectable (LANTUS) 18 Unit(s) SubCutaneous at bedtime  insulin lispro (ADMELOG) corrective regimen sliding scale   SubCutaneous three times a day before meals  meropenem  IVPB 1000 milliGRAM(s) IV Intermittent every 8 hours  metoprolol tartrate 12.5 milliGRAM(s) Oral two times a day  mexiletine 200 milliGRAM(s) Oral every 8 hours  pantoprazole    Tablet 40 milliGRAM(s) Oral before breakfast  silver sulfADIAZINE 1% Cream 1 Application(s) Topical daily  trimethoprim  160 mG/sulfamethoxazole 800 mG 1 Tablet(s) Oral daily    MEDICATIONS  (PRN):  lactulose Syrup 10 Gram(s) Oral two times a day PRN constipation      all labs reviewed  all imaging reviewed      a/p:    1. Acute resp failure    2. Pna:  c/w Meropenem IV Tid    3. Adrenal Insuff:  c/w Hydrocortisone IV   taper as tolerated     4. DM type II    5. PAfib:  c/w Apixaban  c/w Lopressor , Mexiletine 
Date of service: 12-17-22 @ 13:46    Lying in bed in NAD  Alert and confused  Poorly verbal  Has dry cough    ROS: no fever or chills; denies pain    MEDICATIONS  (STANDING):  albuterol/ipratropium for Nebulization 3 milliLiter(s) Nebulizer every 20 minutes  apixaban 5 milliGRAM(s) Oral two times a day  chlorhexidine 4% Liquid 1 Application(s) Topical <User Schedule>  dextrose 50% Injectable 25 Gram(s) IV Push once  dextrose 50% Injectable 12.5 Gram(s) IV Push once  dextrose 50% Injectable 25 Gram(s) IV Push once  dextrose Oral Gel 15 Gram(s) Oral once  fluconAZOLE IVPB      fluconAZOLE IVPB 400 milliGRAM(s) IV Intermittent every 24 hours  glucagon  Injectable 1 milliGRAM(s) IntraMuscular once  hydrocortisone sodium succinate Injectable 100 milliGRAM(s) IV Push every 8 hours  insulin glargine Injectable (LANTUS) 18 Unit(s) SubCutaneous at bedtime  insulin lispro (ADMELOG) corrective regimen sliding scale   SubCutaneous three times a day before meals  insulin lispro Injectable (ADMELOG) 5 Unit(s) SubCutaneous before breakfast  insulin lispro Injectable (ADMELOG) 5 Unit(s) SubCutaneous before lunch  insulin lispro Injectable (ADMELOG) 5 Unit(s) SubCutaneous before dinner  meropenem  IVPB 1000 milliGRAM(s) IV Intermittent every 8 hours  metoprolol tartrate 12.5 milliGRAM(s) Oral two times a day  mexiletine 200 milliGRAM(s) Oral every 8 hours  pantoprazole    Tablet 40 milliGRAM(s) Oral before breakfast  silver sulfADIAZINE 1% Cream 1 Application(s) Topical daily  trimethoprim  160 mG/sulfamethoxazole 800 mG 1 Tablet(s) Oral daily    Vital Signs Last 24 Hrs  T(C): 36 (17 Dec 2022 05:00), Max: 36 (17 Dec 2022 05:00)  T(F): 96.8 (17 Dec 2022 05:00), Max: 96.8 (17 Dec 2022 05:00)  HR: 52 (17 Dec 2022 12:00) (43 - 59)  BP: 155/71 (17 Dec 2022 12:00) (135/75 - 179/75)  BP(mean): 91 (17 Dec 2022 12:00) (77 - 101)  RR: 16 (17 Dec 2022 12:00) (10 - 22)  SpO2: 98% (17 Dec 2022 12:00) (91% - 100%)    Parameters below as of 17 Dec 2022 09:25  Patient On (Oxygen Delivery Method): nasal cannula  O2 Flow (L/min): 2       Physical exam:    Constitutional:  No acute distress  HEENT: NC/AT, EOMI, PERRLA, conjunctivae clear; ears and nose atraumatic  Neck: supple; thyroid not palpable  Back: no tenderness  Respiratory: respiratory effort normal; crackles at bases  Cardiovascular: S1S2 regular, no murmurs  Abdomen: soft, not tender, not distended, positive BS  Genitourinary: no suprapubic tenderness  Lymphatic: no LN palpable  Musculoskeletal: no muscle tenderness, no joint swelling or tenderness  Extremities: no pedal edema  Neurological/ Psychiatric: AxOx3, moving all extremities  Skin: no rashes; no palpable lesions    Labs: reviewed                        11.1   12.55 )-----------( 130      ( 17 Dec 2022 06:58 )             36.7     12-17    138  |  104  |  13  ----------------------------<  125<H>  3.4<L>   |  28  |  0.45<L>    Ca    9.0      17 Dec 2022 06:58  Phos  2.3     12-16  Mg     1.8     12-16    TPro  6.2  /  Alb  2.2<L>  /  TBili  0.3  /  DBili  x   /  AST  20  /  ALT  25  /  AlkPhos  82  12-17    C-Reactive Protein, Serum: 289 mg/L (12-15-22 @ 06:19)                        9.1    18.41 )-----------( 123      ( 16 Dec 2022 12:58 )             28.7     12-16    139  |  107  |  18  ----------------------------<  140<H>  3.3<L>   |  26  |  0.42<L>    Ca    8.5      16 Dec 2022 12:58  Phos  2.3     12-16  Mg     1.8     12-16    TPro  5.6<L>  /  Alb  2.0<L>  /  TBili  0.2  /  DBili  x   /  AST  18  /  ALT  15  /  AlkPhos  116  12-15    C-Reactive Protein, Serum: 289 mg/L (12-15-22 @ 06:19)                        9.9    26.52 )-----------( 138      ( 15 Dec 2022 06:19 )             31.4     12-15    140  |  108  |  20  ----------------------------<  193<H>  4.4   |  24  |  0.84    Ca    8.6      15 Dec 2022 06:19  Phos  3.3     12-15  Mg     1.7     12-15    TPro  5.6<L>  /  Alb  2.0<L>  /  TBili  0.2  /  DBili  x   /  AST  18  /  ALT  15  /  AlkPhos  116  12-15     LIVER FUNCTIONS - ( 15 Dec 2022 06:19 )  Alb: 2.0 g/dL / Pro: 5.6 gm/dL / ALK PHOS: 116 U/L / ALT: 15 U/L / AST: 18 U/L / GGT: x           COVID (12-14 @ 10:56)  Detected      Culture - Blood (collected 14 Dec 2022 11:19)  Source: .Blood None  Preliminary Report (15 Dec 2022 17:02):    No growth to date.    Culture - Blood (collected 14 Dec 2022 11:19)  Source: .Blood None  Preliminary Report (15 Dec 2022 17:02):    No growth to date.    Radiology: all available radiological tests reviewed    < from: CT Chest No Cont (12.14.22 @ 15:00) >  1.  Left lower lobe consolidation suspicious for pneumonia. Other   multifocal findings in both lungs are also suggestive of infection. Mild   multifocal bronchiectasis. A repeat chest CT scan is recommended in 6 months to assess for resolution.  2.  Calcified fibrin sheath versus catheter fragment in the distal right   brachiocephalic vein  < end of copied text >      Advanced directives addressed: full resuscitation
Case signed out to Dr Crow
Date of service: 12-16-22 @ 13:53    Lying in bed in [facility]  More alert today  Opens eyes  Has dry cough    ROS: no fever or chills; poorly verbal    MEDICATIONS  (STANDING):  albuterol/ipratropium for Nebulization 3 milliLiter(s) Nebulizer every 20 minutes  apixaban 5 milliGRAM(s) Oral two times a day  chlorhexidine 4% Liquid 1 Application(s) Topical <User Schedule>  dextrose 50% Injectable 25 Gram(s) IV Push once  dextrose 50% Injectable 12.5 Gram(s) IV Push once  dextrose 50% Injectable 25 Gram(s) IV Push once  dextrose Oral Gel 15 Gram(s) Oral once  fluconAZOLE IVPB      fluconAZOLE IVPB 400 milliGRAM(s) IV Intermittent every 24 hours  glucagon  Injectable 1 milliGRAM(s) IntraMuscular once  hydrocortisone sodium succinate Injectable 100 milliGRAM(s) IV Push every 8 hours  insulin glargine Injectable (LANTUS) 18 Unit(s) SubCutaneous at bedtime  insulin lispro (ADMELOG) corrective regimen sliding scale   SubCutaneous three times a day before meals  insulin lispro Injectable (ADMELOG) 5 Unit(s) SubCutaneous before breakfast  insulin lispro Injectable (ADMELOG) 5 Unit(s) SubCutaneous before lunch  insulin lispro Injectable (ADMELOG) 5 Unit(s) SubCutaneous before dinner  meropenem  IVPB 1000 milliGRAM(s) IV Intermittent every 8 hours  metoprolol tartrate 12.5 milliGRAM(s) Oral two times a day  mexiletine 200 milliGRAM(s) Oral every 8 hours  pantoprazole    Tablet 40 milliGRAM(s) Oral before breakfast  silver sulfADIAZINE 1% Cream 1 Application(s) Topical daily  trimethoprim  160 mG/sulfamethoxazole 800 mG 1 Tablet(s) Oral daily    Vital Signs Last 24 Hrs  T(C): 36.4 (16 Dec 2022 09:21), Max: 36.6 (16 Dec 2022 00:55)  T(F): 97.6 (16 Dec 2022 09:21), Max: 97.9 (16 Dec 2022 00:55)  HR: 57 (16 Dec 2022 11:00) (55 - 72)  BP: 153/57 (16 Dec 2022 11:00) (117/51 - 153/57)  BP(mean): 81 (16 Dec 2022 11:00) (67 - 87)  RR: 12 (16 Dec 2022 11:00) (11 - 26)  SpO2: 92% (16 Dec 2022 11:00) (92% - 100%)    Parameters below as of 16 Dec 2022 08:00  Patient On (Oxygen Delivery Method): nasal cannula  O2 Flow (L/min): 2     Physical exam:    Constitutional:  No acute distress  HEENT: NC/AT, EOMI, PERRLA, conjunctivae clear; ears and nose atraumatic  Neck: supple; thyroid not palpable  Back: no tenderness  Respiratory: respiratory effort normal; crackles at bases  Cardiovascular: S1S2 regular, no murmurs  Abdomen: soft, not tender, not distended, positive BS  Genitourinary: no suprapubic tenderness  Lymphatic: no LN palpable  Musculoskeletal: no muscle tenderness, no joint swelling or tenderness  Extremities: no pedal edema  Neurological/ Psychiatric: AxOx3, moving all extremities  Skin: no rashes; no palpable lesions    Labs: reviewed                        9.1    18.41 )-----------( 123      ( 16 Dec 2022 12:58 )             28.7     12-16    139  |  107  |  18  ----------------------------<  140<H>  3.3<L>   |  26  |  0.42<L>    Ca    8.5      16 Dec 2022 12:58  Phos  2.3     12-16  Mg     1.8     12-16    TPro  5.6<L>  /  Alb  2.0<L>  /  TBili  0.2  /  DBili  x   /  AST  18  /  ALT  15  /  AlkPhos  116  12-15    C-Reactive Protein, Serum: 289 mg/L (12-15-22 @ 06:19)                        9.9    26.52 )-----------( 138      ( 15 Dec 2022 06:19 )             31.4     12-15    140  |  108  |  20  ----------------------------<  193<H>  4.4   |  24  |  0.84    Ca    8.6      15 Dec 2022 06:19  Phos  3.3     12-15  Mg     1.7     12-15    TPro  5.6<L>  /  Alb  2.0<L>  /  TBili  0.2  /  DBili  x   /  AST  18  /  ALT  15  /  AlkPhos  116  12-15     LIVER FUNCTIONS - ( 15 Dec 2022 06:19 )  Alb: 2.0 g/dL / Pro: 5.6 gm/dL / ALK PHOS: 116 U/L / ALT: 15 U/L / AST: 18 U/L / GGT: x           COVID (12-14 @ 10:56)  Detected    Culture - Blood (collected 14 Dec 2022 11:19)  Source: .Blood None  Preliminary Report (15 Dec 2022 17:02):    No growth to date.    Culture - Blood (collected 14 Dec 2022 11:19)  Source: .Blood None  Preliminary Report (15 Dec 2022 17:02):    No growth to date.    Radiology: all available radiological tests reviewed    < from: CT Chest No Cont (12.14.22 @ 15:00) >  1.  Left lower lobe consolidation suspicious for pneumonia. Other   multifocal findings in both lungs are also suggestive of infection. Mild   multifocal bronchiectasis. A repeat chest CT scan is recommended in 6 months to assess for resolution.  2.  Calcified fibrin sheath versus catheter fragment in the distal right   brachiocephalic vein  < end of copied text >      Advanced directives addressed: full resuscitation
Date of service: 12-18-22 @ 13:17    Lying in bed in NAD  Has dry cough  SOB is improved    ROS: no fever or chills; denies dizziness, no HA, no abdominal pain, no diarrhea or constipation; no dysuria, no legs pain, no rashes    MEDICATIONS  (STANDING):  albuterol/ipratropium for Nebulization 3 milliLiter(s) Nebulizer every 20 minutes  apixaban 5 milliGRAM(s) Oral two times a day  chlorhexidine 4% Liquid 1 Application(s) Topical <User Schedule>  dextrose 50% Injectable 25 Gram(s) IV Push once  dextrose 50% Injectable 12.5 Gram(s) IV Push once  dextrose 50% Injectable 25 Gram(s) IV Push once  dextrose Oral Gel 15 Gram(s) Oral once  fluconAZOLE IVPB      fluconAZOLE IVPB 400 milliGRAM(s) IV Intermittent every 24 hours  glucagon  Injectable 1 milliGRAM(s) IntraMuscular once  hydrocortisone sodium succinate Injectable 50 milliGRAM(s) IV Push every 8 hours  insulin glargine Injectable (LANTUS) 18 Unit(s) SubCutaneous at bedtime  insulin lispro (ADMELOG) corrective regimen sliding scale   SubCutaneous three times a day before meals  insulin lispro Injectable (ADMELOG) 5 Unit(s) SubCutaneous before breakfast  insulin lispro Injectable (ADMELOG) 5 Unit(s) SubCutaneous before lunch  insulin lispro Injectable (ADMELOG) 5 Unit(s) SubCutaneous before dinner  meropenem  IVPB 1000 milliGRAM(s) IV Intermittent every 8 hours  metoprolol tartrate 12.5 milliGRAM(s) Oral two times a day  mexiletine 200 milliGRAM(s) Oral every 8 hours  pantoprazole    Tablet 40 milliGRAM(s) Oral before breakfast  silver sulfADIAZINE 1% Cream 1 Application(s) Topical daily  trimethoprim  160 mG/sulfamethoxazole 800 mG 1 Tablet(s) Oral daily    Vital Signs Last 24 Hrs  T(C): 36.1 (18 Dec 2022 06:14), Max: 36.1 (18 Dec 2022 06:14)  T(F): 97 (18 Dec 2022 06:14), Max: 97 (18 Dec 2022 06:14)  HR: 53 (18 Dec 2022 08:00) (51 - 58)  BP: 152/76 (18 Dec 2022 08:00) (141/67 - 165/75)  BP(mean): 95 (18 Dec 2022 08:00) (80 - 96)  RR: 16 (18 Dec 2022 08:00) (10 - 16)  SpO2: 95% (18 Dec 2022 08:00) (93% - 96%)    Parameters below as of 18 Dec 2022 08:00  Patient On (Oxygen Delivery Method): room air     Physical exam:    Constitutional:  No acute distress  HEENT: NC/AT, EOMI, PERRLA, conjunctivae clear; ears and nose atraumatic  Neck: supple; thyroid not palpable  Back: no tenderness  Respiratory: respiratory effort normal; crackles at bases  Cardiovascular: S1S2 regular, no murmurs  Abdomen: soft, not tender, not distended, positive BS  Genitourinary: no suprapubic tenderness  Lymphatic: no LN palpable  Musculoskeletal: no muscle tenderness, no joint swelling or tenderness  Extremities: no pedal edema  Neurological/ Psychiatric: AxOx3, moving all extremities  Skin: no rashes; no palpable lesions    Labs: reviewed                        11.1   12.55 )-----------( 130      ( 17 Dec 2022 06:58 )             36.7     12-17    138  |  104  |  13  ----------------------------<  125<H>  3.4<L>   |  28  |  0.45<L>    Ca    9.0      17 Dec 2022 06:58    TPro  6.2  /  Alb  2.2<L>  /  TBili  0.3  /  DBili  x   /  AST  20  /  ALT  25  /  AlkPhos  82  12-17    C-Reactive Protein, Serum: 289 mg/L (12-15-22 @ 06:19)                        9.1    18.41 )-----------( 123      ( 16 Dec 2022 12:58 )             28.7     12-16    139  |  107  |  18  ----------------------------<  140<H>  3.3<L>   |  26  |  0.42<L>    Ca    8.5      16 Dec 2022 12:58  Phos  2.3     12-16  Mg     1.8     12-16    TPro  5.6<L>  /  Alb  2.0<L>  /  TBili  0.2  /  DBili  x   /  AST  18  /  ALT  15  /  AlkPhos  116  12-15    C-Reactive Protein, Serum: 289 mg/L (12-15-22 @ 06:19)                        9.9    26.52 )-----------( 138      ( 15 Dec 2022 06:19 )             31.4     12-15    140  |  108  |  20  ----------------------------<  193<H>  4.4   |  24  |  0.84    Ca    8.6      15 Dec 2022 06:19  Phos  3.3     12-15  Mg     1.7     12-15    TPro  5.6<L>  /  Alb  2.0<L>  /  TBili  0.2  /  DBili  x   /  AST  18  /  ALT  15  /  AlkPhos  116  12-15     LIVER FUNCTIONS - ( 15 Dec 2022 06:19 )  Alb: 2.0 g/dL / Pro: 5.6 gm/dL / ALK PHOS: 116 U/L / ALT: 15 U/L / AST: 18 U/L / GGT: x           COVID (12-14 @ 10:56)  Detected      Culture - Blood (collected 14 Dec 2022 11:19)  Source: .Blood None  Preliminary Report (15 Dec 2022 17:02):    No growth to date.    Culture - Blood (collected 14 Dec 2022 11:19)  Source: .Blood None  Preliminary Report (15 Dec 2022 17:02):    No growth to date.    Radiology: all available radiological tests reviewed    < from: CT Chest No Cont (12.14.22 @ 15:00) >  1.  Left lower lobe consolidation suspicious for pneumonia. Other   multifocal findings in both lungs are also suggestive of infection. Mild   multifocal bronchiectasis. A repeat chest CT scan is recommended in 6 months to assess for resolution.  2.  Calcified fibrin sheath versus catheter fragment in the distal right   brachiocephalic vein  < end of copied text >      Advanced directives addressed: full resuscitation
Date of service: 12-19-22 @ 12:59    Lying in bed in NAD  SOB is improving  Has dry cough  Off High flow O2    ROS: no fever or chills; denies dizziness, no HA, no abdominal pain, no diarrhea or constipation; no dysuria, no legs pain, no rashes    MEDICATIONS  (STANDING):  albuterol/ipratropium for Nebulization 3 milliLiter(s) Nebulizer every 20 minutes  apixaban 5 milliGRAM(s) Oral two times a day  chlorhexidine 4% Liquid 1 Application(s) Topical <User Schedule>  dextrose 50% Injectable 25 Gram(s) IV Push once  dextrose 50% Injectable 12.5 Gram(s) IV Push once  dextrose 50% Injectable 25 Gram(s) IV Push once  dextrose Oral Gel 15 Gram(s) Oral once  fluconAZOLE IVPB      fluconAZOLE IVPB 400 milliGRAM(s) IV Intermittent every 24 hours  glucagon  Injectable 1 milliGRAM(s) IntraMuscular once  hydrocortisone sodium succinate Injectable 50 milliGRAM(s) IV Push every 8 hours  insulin glargine Injectable (LANTUS) 18 Unit(s) SubCutaneous at bedtime  insulin lispro (ADMELOG) corrective regimen sliding scale   SubCutaneous three times a day before meals  insulin lispro Injectable (ADMELOG) 5 Unit(s) SubCutaneous before breakfast  insulin lispro Injectable (ADMELOG) 5 Unit(s) SubCutaneous before dinner  insulin lispro Injectable (ADMELOG) 5 Unit(s) SubCutaneous before lunch  meropenem  IVPB 1000 milliGRAM(s) IV Intermittent every 8 hours  metoprolol tartrate 12.5 milliGRAM(s) Oral two times a day  mexiletine 200 milliGRAM(s) Oral every 8 hours  pantoprazole    Tablet 40 milliGRAM(s) Oral before breakfast  silver sulfADIAZINE 1% Cream 1 Application(s) Topical daily  trimethoprim  160 mG/sulfamethoxazole 800 mG 1 Tablet(s) Oral daily    Vital Signs Last 24 Hrs  T(C): 36 (19 Dec 2022 11:06), Max: 36.3 (19 Dec 2022 00:57)  T(F): 96.8 (19 Dec 2022 11:06), Max: 97.3 (19 Dec 2022 00:57)  HR: 57 (19 Dec 2022 08:00) (52 - 62)  BP: 141/65 (19 Dec 2022 08:00) (121/60 - 149/71)  BP(mean): 83 (19 Dec 2022 08:00) (73 - 85)  RR: 18 (19 Dec 2022 08:00) (13 - 18)  SpO2: 99% (19 Dec 2022 08:00) (93% - 99%)    Parameters below as of 19 Dec 2022 08:00  Patient On (Oxygen Delivery Method): room air     Physical exam:    Constitutional:  No acute distress  HEENT: NC/AT, EOMI, PERRLA, conjunctivae clear; ears and nose atraumatic  Neck: supple; thyroid not palpable  Back: no tenderness  Respiratory: respiratory effort normal; crackles at bases  Cardiovascular: S1S2 regular, no murmurs  Abdomen: soft, not tender, not distended, positive BS  Genitourinary: no suprapubic tenderness  Lymphatic: no LN palpable  Musculoskeletal: no muscle tenderness, no joint swelling or tenderness  Extremities: no pedal edema  Neurological/ Psychiatric: AxOx3, moving all extremities  Skin: no rashes; no palpable lesions    Labs: reviewed    C-Reactive Protein, Serum: 289 mg/L (12-15-22 @ 06:19)                        11.1   12.55 )-----------( 130      ( 17 Dec 2022 06:58 )             36.7     12-17    138  |  104  |  13  ----------------------------<  125<H>  3.4<L>   |  28  |  0.45<L>    Ca    9.0      17 Dec 2022 06:58    TPro  6.2  /  Alb  2.2<L>  /  TBili  0.3  /  DBili  x   /  AST  20  /  ALT  25  /  AlkPhos  82  12-17    C-Reactive Protein, Serum: 289 mg/L (12-15-22 @ 06:19)                        9.1    18.41 )-----------( 123      ( 16 Dec 2022 12:58 )             28.7     12-16    139  |  107  |  18  ----------------------------<  140<H>  3.3<L>   |  26  |  0.42<L>    Ca    8.5      16 Dec 2022 12:58  Phos  2.3     12-16  Mg     1.8     12-16    TPro  5.6<L>  /  Alb  2.0<L>  /  TBili  0.2  /  DBili  x   /  AST  18  /  ALT  15  /  AlkPhos  116  12-15    C-Reactive Protein, Serum: 289 mg/L (12-15-22 @ 06:19)                        9.9    26.52 )-----------( 138      ( 15 Dec 2022 06:19 )             31.4     12-15    140  |  108  |  20  ----------------------------<  193<H>  4.4   |  24  |  0.84    Ca    8.6      15 Dec 2022 06:19  Phos  3.3     12-15  Mg     1.7     12-15    TPro  5.6<L>  /  Alb  2.0<L>  /  TBili  0.2  /  DBili  x   /  AST  18  /  ALT  15  /  AlkPhos  116  12-15     LIVER FUNCTIONS - ( 15 Dec 2022 06:19 )  Alb: 2.0 g/dL / Pro: 5.6 gm/dL / ALK PHOS: 116 U/L / ALT: 15 U/L / AST: 18 U/L / GGT: x           COVID (12-14 @ 10:56)  Detected      Culture - Blood (collected 14 Dec 2022 11:19)  Source: .Blood None  Preliminary Report (15 Dec 2022 17:02):    No growth to date.    Culture - Blood (collected 14 Dec 2022 11:19)  Source: .Blood None  Preliminary Report (15 Dec 2022 17:02):    No growth to date.    Radiology: all available radiological tests reviewed    < from: CT Chest No Cont (12.14.22 @ 15:00) >  1.  Left lower lobe consolidation suspicious for pneumonia. Other   multifocal findings in both lungs are also suggestive of infection. Mild   multifocal bronchiectasis. A repeat chest CT scan is recommended in 6 months to assess for resolution.  2.  Calcified fibrin sheath versus catheter fragment in the distal right   brachiocephalic vein  < end of copied text >      Advanced directives addressed: full resuscitation
Date of service: 12-21-22 @ 10:09    Lying in bed in NAD  Weak looking  Has dry cough    ROS: no fever or chills; denies dizziness, no HA, no abdominal pain, no diarrhea or constipation; no dysuria, no legs pain, no rashes    MEDICATIONS  (STANDING):  albuterol    0.083% 2.5 milliGRAM(s) Nebulizer every 6 hours  albuterol/ipratropium for Nebulization 3 milliLiter(s) Nebulizer every 20 minutes  apixaban 5 milliGRAM(s) Oral two times a day  chlorhexidine 4% Liquid 1 Application(s) Topical <User Schedule>  dextrose 50% Injectable 25 Gram(s) IV Push once  dextrose 50% Injectable 12.5 Gram(s) IV Push once  dextrose 50% Injectable 25 Gram(s) IV Push once  dextrose Oral Gel 15 Gram(s) Oral once  fluconAZOLE   Tablet 400 milliGRAM(s) Oral every 24 hours  glucagon  Injectable 1 milliGRAM(s) IntraMuscular once  insulin glargine Injectable (LANTUS) 18 Unit(s) SubCutaneous at bedtime  insulin lispro (ADMELOG) corrective regimen sliding scale   SubCutaneous three times a day before meals  insulin lispro Injectable (ADMELOG) 5 Unit(s) SubCutaneous before breakfast  insulin lispro Injectable (ADMELOG) 5 Unit(s) SubCutaneous before lunch  insulin lispro Injectable (ADMELOG) 5 Unit(s) SubCutaneous before dinner  meropenem  IVPB 1000 milliGRAM(s) IV Intermittent every 8 hours  methylPREDNISolone 8 milliGRAM(s) Oral daily  metoprolol tartrate 12.5 milliGRAM(s) Oral two times a day  mexiletine 200 milliGRAM(s) Oral every 8 hours  pantoprazole    Tablet 40 milliGRAM(s) Oral before breakfast  silver sulfADIAZINE 1% Cream 1 Application(s) Topical daily  trimethoprim  160 mG/sulfamethoxazole 800 mG 1 Tablet(s) Oral daily    Vital Signs Last 24 Hrs  T(C): 37.2 (21 Dec 2022 08:44), Max: 37.2 (21 Dec 2022 08:44)  T(F): 99 (21 Dec 2022 08:44), Max: 99 (21 Dec 2022 08:44)  HR: 63 (21 Dec 2022 08:44) (56 - 86)  BP: 128/69 (21 Dec 2022 08:44) (124/58 - 131/69)  BP(mean): --  RR: 18 (21 Dec 2022 08:44) (16 - 18)  SpO2: 100% (21 Dec 2022 08:44) (94% - 100%)    Parameters below as of 21 Dec 2022 08:44  Patient On (Oxygen Delivery Method): room air     Physical exam:    Constitutional:  No acute distress  HEENT: NC/AT, EOMI, PERRLA, conjunctivae clear; ears and nose atraumatic  Neck: supple; thyroid not palpable  Back: no tenderness  Respiratory: respiratory effort normal; crackles at bases  Cardiovascular: S1S2 regular, no murmurs  Abdomen: soft, not tender, not distended, positive BS  Genitourinary: no suprapubic tenderness  Lymphatic: no LN palpable  Musculoskeletal: no muscle tenderness, no joint swelling or tenderness  Extremities: no pedal edema  Neurological/ Psychiatric: AxOx3, moving all extremities  Skin: no rashes; no palpable lesions    Labs: reviewed    C-Reactive Protein, Serum: 289 mg/L (12-15-22 @ 06:19)                        11.1   12.55 )-----------( 130      ( 17 Dec 2022 06:58 )             36.7     12-17    138  |  104  |  13  ----------------------------<  125<H>  3.4<L>   |  28  |  0.45<L>    Ca    9.0      17 Dec 2022 06:58    TPro  6.2  /  Alb  2.2<L>  /  TBili  0.3  /  DBili  x   /  AST  20  /  ALT  25  /  AlkPhos  82  12-17    C-Reactive Protein, Serum: 289 mg/L (12-15-22 @ 06:19)                        9.1    18.41 )-----------( 123      ( 16 Dec 2022 12:58 )             28.7     12-16    139  |  107  |  18  ----------------------------<  140<H>  3.3<L>   |  26  |  0.42<L>    Ca    8.5      16 Dec 2022 12:58  Phos  2.3     12-16  Mg     1.8     12-16    TPro  5.6<L>  /  Alb  2.0<L>  /  TBili  0.2  /  DBili  x   /  AST  18  /  ALT  15  /  AlkPhos  116  12-15    C-Reactive Protein, Serum: 289 mg/L (12-15-22 @ 06:19)                        9.9    26.52 )-----------( 138      ( 15 Dec 2022 06:19 )             31.4     12-15    140  |  108  |  20  ----------------------------<  193<H>  4.4   |  24  |  0.84    Ca    8.6      15 Dec 2022 06:19  Phos  3.3     12-15  Mg     1.7     12-15    TPro  5.6<L>  /  Alb  2.0<L>  /  TBili  0.2  /  DBili  x   /  AST  18  /  ALT  15  /  AlkPhos  116  12-15     LIVER FUNCTIONS - ( 15 Dec 2022 06:19 )  Alb: 2.0 g/dL / Pro: 5.6 gm/dL / ALK PHOS: 116 U/L / ALT: 15 U/L / AST: 18 U/L / GGT: x           COVID (12-14 @ 10:56)  Detected    Culture - Blood (collected 14 Dec 2022 11:19)  Source: .Blood None  Preliminary Report (15 Dec 2022 17:02):    No growth to date.    Culture - Blood (collected 14 Dec 2022 11:19)  Source: .Blood None  Preliminary Report (15 Dec 2022 17:02):    No growth to date.    Radiology: all available radiological tests reviewed    < from: CT Chest No Cont (12.14.22 @ 15:00) >  1.  Left lower lobe consolidation suspicious for pneumonia. Other   multifocal findings in both lungs are also suggestive of infection. Mild   multifocal bronchiectasis. A repeat chest CT scan is recommended in 6 months to assess for resolution.  2.  Calcified fibrin sheath versus catheter fragment in the distal right   brachiocephalic vein  < end of copied text >      Advanced directives addressed: full resuscitation
Date of Consult: 12/15/22  HPI: 72y year old Female with PMHx of adrenal insufficiency, aortic insufficiency, asthma, Afib on Eliquis, colorectal cancer s/p resection with colostomy bag, COPD, DM, DVT, pelvic fracture, rectal bleeding, seizure, TIA, tracheobronchomalacia, type I aortic dissection s/p Bentall procedure and hemiarch replacement 9/6 with Dr. Cabrera presents to the ED BIBA from [facility] for worsening SOB since last night. Pt was found to be hypoxic at [facility] and sent to the ED for evaluation. Pt recently admitted for COVID and RDV Tx at [place]. Pt On bipap. CXR concerning for PNA. On abx.     Podiatry consulted for evaluation of bilateral foot wounds per daughter request. Daughter not present at the time of evaluation. Pt resting comfortably at beside on bipap, none verbal, poor historian. Medical hx acquired from pt's chart. No additional pedal complaints reported.     12/15/22: Patient seen by podiatry team with attending present this AM. Patient resting comfortably at bedside. No additional pedal complaints reported.      PMH: Atrial fibrillation    Diabetes    Hypertension    COPD (chronic obstructive pulmonary disease)    Adrenal insufficiency    Aortic insufficiency    Pelvic fracture    Asthma    Hypertension    Tracheobronchomalacia    Colorectal cancer    Aortic disease    Rectal bleeding    Seizure    DVT (deep venous thrombosis)    TIA (transient ischemic attack)      PSH:History of partial hysterectomy    H/O total knee replacement, bilateral    History of sinus surgery    Exostosis of orbit, left    H/O pelvic surgery    History of surgery    History of tracheomalacia    S/P bronchoscopy    Rectal bleeding        Allergies:aspirin (Short breath)  Avelox (Short breath; Pruritus)  cefepime (Anaphylaxis)  codeine (Short breath)  Dilaudid (Short breath)  iodine (Short breath; Swelling)  penicillin (Anaphylaxis)  shellfish (Anaphylaxis)  tetanus toxoid (Short breath)  Valium (Short breath)    MEDICATIONS  (STANDING):  albuterol/ipratropium for Nebulization 3 milliLiter(s) Nebulizer every 20 minutes  apixaban 5 milliGRAM(s) Oral two times a day  chlorhexidine 4% Liquid 1 Application(s) Topical <User Schedule>  dextrose 50% Injectable 25 Gram(s) IV Push once  dextrose 50% Injectable 12.5 Gram(s) IV Push once  dextrose 50% Injectable 25 Gram(s) IV Push once  dextrose Oral Gel 15 Gram(s) Oral once  fluconAZOLE IVPB      fluconAZOLE IVPB 400 milliGRAM(s) IV Intermittent every 24 hours  glucagon  Injectable 1 milliGRAM(s) IntraMuscular once  hydrocortisone sodium succinate Injectable 100 milliGRAM(s) IV Push every 8 hours  insulin glargine Injectable (LANTUS) 18 Unit(s) SubCutaneous at bedtime  insulin lispro (ADMELOG) corrective regimen sliding scale   SubCutaneous three times a day before meals  insulin lispro Injectable (ADMELOG) 5 Unit(s) SubCutaneous before breakfast  insulin lispro Injectable (ADMELOG) 5 Unit(s) SubCutaneous before lunch  insulin lispro Injectable (ADMELOG) 5 Unit(s) SubCutaneous before dinner  lactated ringers. 1000 milliLiter(s) (500 mL/Hr) IV Continuous <Continuous>  meropenem  IVPB 1000 milliGRAM(s) IV Intermittent every 8 hours  metoprolol tartrate 12.5 milliGRAM(s) Oral two times a day  mexiletine 200 milliGRAM(s) Oral every 8 hours  pantoprazole    Tablet 40 milliGRAM(s) Oral before breakfast  silver sulfADIAZINE 1% Cream 1 Application(s) Topical daily  trimethoprim  160 mG/sulfamethoxazole 800 mG 1 Tablet(s) Oral daily    MEDICATIONS  (PRN):        Labs:                                   9.9    26.52 )-----------( 138      ( 15 Dec 2022 06:19 )             31.4     12-15    140  |  108  |  20  ----------------------------<  193<H>  4.4   |  24  |  0.84    Ca    8.6      15 Dec 2022 06:19  Phos  3.3     12-15  Mg     1.7     12-15    TPro  5.6<L>  /  Alb  2.0<L>  /  TBili  0.2  /  DBili  x   /  AST  18  /  ALT  15  /  AlkPhos  116  12-15        T(F): 97 (12-14-22 @ 16:00), Max: 97.7 (12-14-22 @ 14:00)  HR: 103 (12-14-22 @ 16:00) (103 - 135)  BP: 100/59 (12-14-22 @ 16:00) (90/58 - 100/70)  RR: 27 (12-14-22 @ 16:00) (20 - 27)  SpO2: 95% (12-14-22 @ 16:00) (82% - 99%)  Wt(kg): --    REVIEW OF SYSTEMS:  All other review of systems is negative unless indicated above    Physical Exam:   Constitutional: labored breathing, somnolence  Derm:  dry and supple bilateral.    Ulceration to left heel, superficial in nature, no hyperkeratotic border, wound base Fibrogranular, wound size (approx 1cm X 1cm), no edema, no timi-wound erythema, no purulence, no fluctuance, no tracking/tunneling, no probe to bone, no malodor.    Hemorraghic bullae at medial Right midfoot and Right medial malleolus, no drainage, no PTB, no purulence, no malodor.   Vascular: Dorsalis Pedis and Posterior Tibial pulses 1/4.  Capillary re-fill time less then 3 seconds digits 1-5 bilateral.    Neuro: Unable to assess due to pt condition  MSK: Unable to assess due to pt condition      
PMHx of adrenal insufficiency, aortic insufficiency, asthma, Afib on Eliquis, colorectal cancer s/p resection with colostomy bag, COPD, DM, DVT, pelvic fracture, rectal bleeding, seizure, TIA, tracheobronchomalacia, type I aortic dissection s/p Bentall procedure and hemiarch replacement 9/6 with Dr. Cabrera presents to the ED BIBA from [facility] for worsening SOB since last night. Pt was found to be hypoxic at [facility] and sent to the ED for evaluation.    12/15 Clinically improved on high flow. Case discussed on CCU rounds and with daughter.  12/16  Clinically stable, Case discussed on CCU rounds. For transfer today and PT.  12.17: +deep productive coughing, no cp        REVIEW OF SYSTEMS:    CONSTITUTIONAL: No weakness, No fevers or chills  ENT: No ear ache, No sorethroat  NECK: No pain, No stiffness  RESPIRATORY: ++cough, No wheezing, No hemoptysis; No dyspnea  CARDIOVASCULAR: No chest pain, No palpitations  GASTROINTESTINAL: No abd pain, No nausea, No vomiting, No hematemesis, No diarrhea or constipation. No melena, No hematochezia.  GENITOURINARY: No dysuria, No  hematuria  NEUROLOGICAL: No diplopia, No paresthesia, No motor dysfunction  MUSCULOSKELETAL: No arthralgia, No myalgia  SKIN: No rashes, or lesions   PSYCH: no anxiety, no suicidal ideation    All other review of systems is negative unless indicated above    Vital Signs Last 24 Hrs  T(C): 36 (17 Dec 2022 05:00), Max: 36 (17 Dec 2022 05:00)  T(F): 96.8 (17 Dec 2022 05:00), Max: 96.8 (17 Dec 2022 05:00)  HR: 52 (17 Dec 2022 16:00) (43 - 58)  BP: 159/68 (17 Dec 2022 16:00) (135/75 - 179/75)  BP(mean): 92 (17 Dec 2022 16:00) (81 - 101)  RR: 14 (17 Dec 2022 16:00) (10 - 17)  SpO2: 94% (17 Dec 2022 16:00) (92% - 100%)    Parameters below as of 17 Dec 2022 09:25  Patient On (Oxygen Delivery Method): nasal cannula  O2 Flow (L/min): 2      PHYSICAL EXAM:    GENERAL: NAD  HEENT:  NC/AT, EOMI, PERRLA, No scleral icterus, Moist mucous membranes  NECK: Supple, No JVD  CNS:  Alert & Oriented X3, Motor Strength 5/5 B/L upper and lower extremities; DTRs 2+ intact   LUNG: Normal Breath sounds, Clear to auscultation bilaterally, No rales, No rhonchi, No wheezing  HEART: RRR; No murmurs, No rubs  ABDOMEN: +BS, ST/ND/NT  GENITOURINARY: Voiding, Bladder not distended  EXTREMITIES:  2+ Peripheral Pulses, No clubbing, No cyanosis, No tibial edema  MUSCULOSKELTAL: Joints normal ROM, No TTP, No effusion  VAGINAL: deferred  SKIN: no rashes  RECTAL: deferred, not indicated  BREAST: deferred                          11.1   12.55 )-----------( 130      ( 17 Dec 2022 06:58 )             36.7     12-17    138  |  104  |  13  ----------------------------<  125<H>  3.4<L>   |  28  |  0.45<L>    Ca    9.0      17 Dec 2022 06:58  Phos  2.3     12-16  Mg     1.8     12-16    TPro  6.2  /  Alb  2.2<L>  /  TBili  0.3  /  DBili  x   /  AST  20  /  ALT  25  /  AlkPhos  82  12-17    Vancomycin levels:   Cultures:     MEDICATIONS  (STANDING):  albuterol/ipratropium for Nebulization 3 milliLiter(s) Nebulizer every 20 minutes  apixaban 5 milliGRAM(s) Oral two times a day  chlorhexidine 4% Liquid 1 Application(s) Topical <User Schedule>  fluconAZOLE IVPB 400 milliGRAM(s) IV Intermittent every 24 hours  glucagon  Injectable 1 milliGRAM(s) IntraMuscular once  hydrocortisone sodium succinate Injectable 100 milliGRAM(s) IV Push every 8 hours  insulin glargine Injectable (LANTUS) 18 Unit(s) SubCutaneous at bedtime  insulin lispro (ADMELOG) corrective regimen sliding scale   SubCutaneous three times a day before meals  meropenem  IVPB 1000 milliGRAM(s) IV Intermittent every 8 hours  metoprolol tartrate 12.5 milliGRAM(s) Oral two times a day  mexiletine 200 milliGRAM(s) Oral every 8 hours  pantoprazole    Tablet 40 milliGRAM(s) Oral before breakfast  silver sulfADIAZINE 1% Cream 1 Application(s) Topical daily  trimethoprim  160 mG/sulfamethoxazole 800 mG 1 Tablet(s) Oral daily    MEDICATIONS  (PRN):  lactulose Syrup 10 Gram(s) Oral two times a day PRN constipation      all labs reviewed  all imaging reviewed      a/p:    1. Acute resp failure    2. Pna:  c/w Meropenem IV Tid    3. Adrenal Insuff:  c/w Hydrocortisone IV   taper as tolerated     4. DM type II
PMHx of adrenal insufficiency, aortic insufficiency, asthma, Afib on Eliquis, colorectal cancer s/p resection with colostomy bag, COPD, DM, DVT, pelvic fracture, rectal bleeding, seizure, TIA, tracheobronchomalacia, type I aortic dissection s/p Bentall procedure and hemiarch replacement 9/6 with Dr. Cabrera presents to the ED BIBA from [facility] for worsening SOB since last night. Pt was found to be hypoxic at [facility] and sent to the ED for evaluation.    12/15 Clinically improved on high flow. Case discussed on CCU rounds and with daughter.  12/16  Clinically stable, Case discussed on CCU rounds. For transfer today and PT.  12.17: +deep productive coughing, no cp  12.18: still coughing, had BM  12.19: deep productive coughing, has difficulty mobilizing secretions       REVIEW OF SYSTEMS:    CONSTITUTIONAL: No weakness, No fevers or chills  ENT: No ear ache, No sorethroat  NECK: No pain, No stiffness  RESPIRATORY: ++cough, No wheezing, No hemoptysis; No dyspnea  CARDIOVASCULAR: No chest pain, No palpitations  GASTROINTESTINAL: No abd pain, No nausea, No vomiting, No hematemesis, No diarrhea or constipation. No melena, No hematochezia.  GENITOURINARY: No dysuria, No  hematuria  NEUROLOGICAL: No diplopia, No paresthesia, No motor dysfunction  MUSCULOSKELETAL: No arthralgia, No myalgia  SKIN: No rashes, or lesions   PSYCH: no anxiety, no suicidal ideation    All other review of systems is negative unless indicated above    Vital Signs Last 24 Hrs  T(C): 36 (19 Dec 2022 11:06), Max: 36.3 (19 Dec 2022 00:57)  T(F): 96.8 (19 Dec 2022 11:06), Max: 97.3 (19 Dec 2022 00:57)  HR: 71 (19 Dec 2022 12:00) (52 - 71)  BP: 114/55 (19 Dec 2022 12:00) (114/55 - 149/71)  BP(mean): 69 (19 Dec 2022 12:00) (69 - 85)  RR: 18 (19 Dec 2022 12:00) (13 - 18)  SpO2: 96% (19 Dec 2022 12:00) (93% - 99%)    Parameters below as of 19 Dec 2022 08:00  Patient On (Oxygen Delivery Method): room air        PHYSICAL EXAM:    GENERAL: NAD  HEENT:  NC/AT, EOMI, PERRLA, No scleral icterus, Moist mucous membranes  NECK: Supple, No JVD  CNS:  Alert & Oriented X3, Motor Strength 5/5 B/L upper and lower extremities; DTRs 2+ intact   LUNG: Normal Breath sounds, Clear to auscultation bilaterally, No rales, No rhonchi, No wheezing  HEART: RRR; No murmurs, No rubs  ABDOMEN: +BS, ST/ND/NT, +colostomy with feces   GENITOURINARY: Voiding, Bladder not distended  EXTREMITIES:  2+ Peripheral Pulses, No clubbing, No cyanosis, No tibial edema  MUSCULOSKELTAL: Joints normal ROM, No TTP, No effusion  VAGINAL: deferred  SKIN: no rashes  RECTAL: deferred, not indicated  BREAST: deferred                          11.1   12.55 )-----------( 130      ( 17 Dec 2022 06:58 )             36.7     12-17    138  |  104  |  13  ----------------------------<  125<H>  3.4<L>   |  28  |  0.45<L>    Ca    9.0      17 Dec 2022 06:58  Phos  2.3     12-16  Mg     1.8     12-16    TPro  6.2  /  Alb  2.2<L>  /  TBili  0.3  /  DBili  x   /  AST  20  /  ALT  25  /  AlkPhos  82  12-17    Vancomycin levels:   Cultures:     MEDICATIONS  (STANDING):  albuterol/ipratropium for Nebulization 3 milliLiter(s) Nebulizer every 20 minutes  apixaban 5 milliGRAM(s) Oral two times a day  chlorhexidine 4% Liquid 1 Application(s) Topical <User Schedule>  fluconAZOLE IVPB 400 milliGRAM(s) IV Intermittent every 24 hours  glucagon  Injectable 1 milliGRAM(s) IntraMuscular once  hydrocortisone sodium succinate Injectable 100 milliGRAM(s) IV Push every 8 hours  insulin glargine Injectable (LANTUS) 18 Unit(s) SubCutaneous at bedtime  insulin lispro (ADMELOG) corrective regimen sliding scale   SubCutaneous three times a day before meals  meropenem  IVPB 1000 milliGRAM(s) IV Intermittent every 8 hours  metoprolol tartrate 12.5 milliGRAM(s) Oral two times a day  mexiletine 200 milliGRAM(s) Oral every 8 hours  pantoprazole    Tablet 40 milliGRAM(s) Oral before breakfast  silver sulfADIAZINE 1% Cream 1 Application(s) Topical daily  trimethoprim  160 mG/sulfamethoxazole 800 mG 1 Tablet(s) Oral daily    MEDICATIONS  (PRN):  lactulose Syrup 10 Gram(s) Oral two times a day PRN constipation      all labs reviewed  all imaging reviewed      a/p:    1. Acute resp failure due to Pna:  resolving   c/w supplemental O2    2. Pna:  c/w Meropenem IV Tid  day#5/7  Albuterol, Mucinex, Chest PT    3. Adrenal Insufficiency   c/w Hydrocortisone IV   taper as tolerated     on Fluconazole and Bactrim prophylaxis     4. DM type II    5. PAfib:  c/w Apixaban  c/w Lopressor , Mexiletine 
PMHx of adrenal insufficiency, aortic insufficiency, asthma, Afib on Eliquis, colorectal cancer s/p resection with colostomy bag, COPD, DM, DVT, pelvic fracture, rectal bleeding, seizure, TIA, tracheobronchomalacia, type I aortic dissection s/p Bentall procedure and hemiarch replacement 9/6 with Dr. Cabrera presents to the ED BIBA from [facility] for worsening SOB since last night. Pt was found to be hypoxic at [facility] and sent to the ED for evaluation.    12/15 Clinically improved on high flow. Case discussed on CCU rounds and with daughter.  12/16  Clinically stable, Case discussed on CCU rounds. For transfer today and PT.  12.17: +deep productive coughing, no cp  12.18: still coughing, had BM  12.19: deep productive coughing, has difficulty mobilizing secretions   12.20: ++cough, no cp, no sob, no n/v/d      REVIEW OF SYSTEMS:    CONSTITUTIONAL: No weakness, No fevers or chills  ENT: No ear ache, No sorethroat  NECK: No pain, No stiffness  RESPIRATORY: ++cough, No wheezing, No hemoptysis; No dyspnea  CARDIOVASCULAR: No chest pain, No palpitations  GASTROINTESTINAL: No abd pain, No nausea, No vomiting, No hematemesis, No diarrhea or constipation. No melena, No hematochezia.  GENITOURINARY: No dysuria, No  hematuria  NEUROLOGICAL: No diplopia, No paresthesia, No motor dysfunction  MUSCULOSKELETAL: No arthralgia, No myalgia  SKIN: No rashes, or lesions   PSYCH: no anxiety, no suicidal ideation    All other review of systems is negative unless indicated above    Vital Signs Last 24 Hrs  T(C): 36.4 (20 Dec 2022 08:12), Max: 36.8 (19 Dec 2022 21:30)  T(F): 97.5 (20 Dec 2022 08:12), Max: 98.2 (19 Dec 2022 21:30)  HR: 84 (20 Dec 2022 14:06) (60 - 90)  BP: 145/62 (20 Dec 2022 08:12) (106/54 - 145/62)  BP(mean): --  RR: 17 (20 Dec 2022 08:12) (16 - 17)  SpO2: 97% (20 Dec 2022 14:06) (97% - 100%)    Parameters below as of 20 Dec 2022 14:06  Patient On (Oxygen Delivery Method): room air        PHYSICAL EXAM:    GENERAL: NAD  HEENT:  NC/AT, EOMI, PERRLA, No scleral icterus, Moist mucous membranes  NECK: Supple, No JVD  CNS:  Alert & Oriented X3, Motor Strength 5/5 B/L upper and lower extremities; DTRs 2+ intact   LUNG: Decreased Breath sounds, Clear to auscultation bilaterally, +rales, + rhonchi, No wheezing  HEART: RRR; No murmurs, No rubs  ABDOMEN: +BS, ST/ND/NT, +colostomy with feces   GENITOURINARY: Voiding, Bladder not distended  EXTREMITIES:  2+ Peripheral Pulses, No clubbing, No cyanosis, No tibial edema  MUSCULOSKELTAL: Joints normal ROM, No TTP, No effusion  VAGINAL: deferred  SKIN: no rashes  RECTAL: deferred, not indicated  BREAST: deferred                          11.1   12.55 )-----------( 130      ( 17 Dec 2022 06:58 )             36.7     12-17    138  |  104  |  13  ----------------------------<  125<H>  3.4<L>   |  28  |  0.45<L>    Ca    9.0      17 Dec 2022 06:58  Phos  2.3     12-16  Mg     1.8     12-16    TPro  6.2  /  Alb  2.2<L>  /  TBili  0.3  /  DBili  x   /  AST  20  /  ALT  25  /  AlkPhos  82  12-17    Vancomycin levels:   Cultures:     MEDICATIONS  (STANDING):  albuterol    0.083% 2.5 milliGRAM(s) Nebulizer every 6 hours  albuterol/ipratropium for Nebulization 3 milliLiter(s) Nebulizer every 20 minutes  apixaban 5 milliGRAM(s) Oral two times a day  chlorhexidine 4% Liquid 1 Application(s) Topical <User Schedule>  fluconAZOLE IVPB 400 milliGRAM(s) IV Intermittent every 24 hours  glucagon  Injectable 1 milliGRAM(s) IntraMuscular once  hydrocortisone sodium succinate Injectable 25 milliGRAM(s) IV Push every 8 hours  insulin glargine Injectable (LANTUS) 18 Unit(s) SubCutaneous at bedtime  insulin lispro (ADMELOG) corrective regimen sliding scale   SubCutaneous three times a day before meals  insulin lispro Injectable (ADMELOG) 5 Unit(s) SubCutaneous before breakfast  insulin lispro Injectable (ADMELOG) 5 Unit(s) SubCutaneous before lunch  insulin lispro Injectable (ADMELOG) 5 Unit(s) SubCutaneous before dinner  meropenem  IVPB 1000 milliGRAM(s) IV Intermittent every 8 hours  metoprolol tartrate 12.5 milliGRAM(s) Oral two times a day  mexiletine 200 milliGRAM(s) Oral every 8 hours  pantoprazole    Tablet 40 milliGRAM(s) Oral before breakfast  silver sulfADIAZINE 1% Cream 1 Application(s) Topical daily  trimethoprim  160 mG/sulfamethoxazole 800 mG 1 Tablet(s) Oral daily        a/p:    1. Acute resp failure due to Pna:  resolving   c/w supplemental O2    2. Recurrent Pna:  c/w Meropenem IV Tid  day#6/7  Albuterol, Mucinex, Chest PT    3. Adrenal Insufficiency   c/w Hydrocortisone IV   taper as tolerated to outpatient dose     on Fluconazole and Bactrim prophylaxis     4. DM type II    5. PAfib:  c/w Apixaban  c/w Lopressor , Mexiletine 
PMHx of adrenal insufficiency, aortic insufficiency, asthma, Afib on Eliquis, colorectal cancer s/p resection with colostomy bag, COPD, DM, DVT, pelvic fracture, rectal bleeding, seizure, TIA, tracheobronchomalacia, type I aortic dissection s/p Bentall procedure and hemiarch replacement 9/6 with Dr. Cabrera presents to the ED BIBA from [facility] for worsening SOB since last night. Pt was found to be hypoxic at [facility] and sent to the ED for evaluation.  12/15 Clinically improved on high flow. Case discussed on CCU rounds and with daughter.  12/16  Clinically stable, Case discussed on CCU rounds. For transfer today and PT.    ICU Vital Signs Last 24 Hrs  T(C): 36.4 (16 Dec 2022 09:21), Max: 36.6 (16 Dec 2022 00:55)  T(F): 97.6 (16 Dec 2022 09:21), Max: 97.9 (16 Dec 2022 00:55)  HR: 57 (16 Dec 2022 11:00) (55 - 72)  BP: 153/57 (16 Dec 2022 11:00) (117/51 - 153/57)  BP(mean): 81 (16 Dec 2022 11:00) (67 - 87)  RR: 12 (16 Dec 2022 11:00) (11 - 26)  SpO2: 92% (16 Dec 2022 11:00) (92% - 100%)    O2 Parameters below as of 16 Dec 2022 08:00  Patient On (Oxygen Delivery Method): nasal cannula  O2 Flow (L/min): 2                                9.9    26.52 )-----------( 138      ( 15 Dec 2022 06:19 )             31.4         12-15    140  |  108  |  20  ----------------------------<  193<H>  4.4   |  24  |  0.84    Ca    8.6      15 Dec 2022 06:19  Phos  3.3     12-15  Mg     1.7     12-15    TPro  5.6<L>  /  Alb  2.0<L>  /  TBili  0.2  /  DBili  x   /  AST  18  /  ALT  15  /  AlkPhos  116  12-15    LIVER FUNCTIONS - ( 15 Dec 2022 06:19 )  Alb: 2.0 g/dL / Pro: 5.6 gm/dL / ALK PHOS: 116 U/L / ALT: 15 U/L / AST: 18 U/L / GGT: x             
PMHx of adrenal insufficiency, aortic insufficiency, asthma, Afib on Eliquis, colorectal cancer s/p resection with colostomy bag, COPD, DM, DVT, pelvic fracture, rectal bleeding, seizure, TIA, tracheobronchomalacia, type I aortic dissection s/p Bentall procedure and hemiarch replacement 9/6 with Dr. Cabrera presents to the ED BIBA from [facility] for worsening SOB since last night. Pt was found to be hypoxic at [facility] and sent to the ED for evaluation.  12/15 Clinically improved on high flow. Case discussed on CCU rounds and with daughter.      ICU Vital Signs Last 24 Hrs  T(C): 36.4 (15 Dec 2022 06:22), Max: 36.7 (15 Dec 2022 00:15)  T(F): 97.5 (15 Dec 2022 06:22), Max: 98 (15 Dec 2022 00:15)  HR: 69 (15 Dec 2022 08:00) (66 - 123)  BP: 114/64 (15 Dec 2022 08:00) (90/58 - 126/80)  BP(mean): 75 (15 Dec 2022 08:00) (51 - 90)  RR: 17 (15 Dec 2022 08:00) (16 - 28)  SpO2: 100% (15 Dec 2022 08:00) (89% - 100%)    O2 Parameters below as of 15 Dec 2022 07:00    O2 Flow (L/min): 35  O2 Concentration (%): 50    ABG - ( 14 Dec 2022 11:33 )  pH, Arterial: 7.43  pH, Blood: x     /  pCO2: 30    /  pO2: 65    / HCO3: 20    / Base Excess: -3.3  /  SaO2: 93                                          9.9    26.52 )-----------( 138      ( 15 Dec 2022 06:19 )             31.4         12-15    140  |  108  |  20  ----------------------------<  193<H>  4.4   |  24  |  0.84    Ca    8.6      15 Dec 2022 06:19  Phos  3.3     12-15  Mg     1.7     12-15    TPro  5.6<L>  /  Alb  2.0<L>  /  TBili  0.2  /  DBili  x   /  AST  18  /  ALT  15  /  AlkPhos  116  12-15    LIVER FUNCTIONS - ( 15 Dec 2022 06:19 )  Alb: 2.0 g/dL / Pro: 5.6 gm/dL / ALK PHOS: 116 U/L / ALT: 15 U/L / AST: 18 U/L / GGT: x             PT/INR - ( 14 Dec 2022 11:19 )   PT: 15.5 sec;   INR: 1.33 ratio         PTT - ( 14 Dec 2022 11:19 )  PTT:31.0 sec

## 2022-12-21 NOTE — PROGRESS NOTE ADULT - PROBLEM SELECTOR PLAN 1
Fluconazole  Meropenem
high flow  wean as tolerated  pulmonary toilet
Fluconazole  Meropenem

## 2022-12-21 NOTE — PROGRESS NOTE ADULT - NUTRITIONAL ASSESSMENT
This patient has been assessed with a concern for Malnutrition and has been determined to have a diagnosis/diagnoses of Severe protein-calorie malnutrition.    This patient is being managed with:   Diet Pureed-  Consistent Carbohydrate {No Snacks} (CSTCHO)  Supplement Feeding Modality:  Oral  Ensure Plus High Protein Cans or Servings Per Day:  1       Frequency:  Three Times a day  Entered: Dec 17 2022  9:54AM    
This patient has been assessed with a concern for Malnutrition and has been determined to have a diagnosis/diagnoses of Severe protein-calorie malnutrition.    This patient is being managed with:   Diet Pureed-  Consistent Carbohydrate {No Snacks} (CSTCHO)  Supplement Feeding Modality:  Oral  Ensure Plus High Protein Cans or Servings Per Day:  1       Frequency:  Three Times a day  Entered: Dec 17 2022  9:54AM    
This patient has been assessed with a concern for Malnutrition and has been determined to have a diagnosis/diagnoses of Severe protein-calorie malnutrition.    This patient is being managed with:   Diet Pureed-  Consistent Carbohydrate {No Snacks} (CSTCHO)  Supplement Feeding Modality:  Oral  Glucerna Shake Cans or Servings Per Day:  1       Frequency:  Three Times a day  Entered: Dec 16 2022  9:59AM    
This patient has been assessed with a concern for Malnutrition and has been determined to have a diagnosis/diagnoses of Severe protein-calorie malnutrition.    This patient is being managed with:   Diet Pureed-  Consistent Carbohydrate {No Snacks} (CSTCHO)  Supplement Feeding Modality:  Oral  Ensure Plus High Protein Cans or Servings Per Day:  1       Frequency:  Three Times a day  Entered: Dec 17 2022  9:54AM    
This patient has been assessed with a concern for Malnutrition and has been determined to have a diagnosis/diagnoses of Severe protein-calorie malnutrition.    This patient is being managed with:   Diet Pureed-  Consistent Carbohydrate {No Snacks} (CSTCHO)  Supplement Feeding Modality:  Oral  Glucerna Shake Cans or Servings Per Day:  1       Frequency:  Three Times a day  Entered: Dec 16 2022  9:59AM

## 2022-12-21 NOTE — PROGRESS NOTE ADULT - REASON FOR ADMISSION
SOB, resp distress

## 2022-12-21 NOTE — PROGRESS NOTE ADULT - ASSESSMENT
71 y/o female with h/o adrenal insufficiency, aortic insufficiency, asthma, A.fib on Eliquis, colorectal cancer s/p resection with colostomy bag, COPD, DM, DVT, pelvic fracture, rectal bleeding, seizure, TIA, tracheobronchomalacia, type I aortic dissection s/p Bentall procedure and hemiarch replacement 9/6 with Dr. Cabrera, recent COVID-19 viral syndrome (Nov 2022) s/p remdesivir was admitted on 12/14 from [facility] for worsening SOB since last night. Pt was found to be hypoxic at [facility] and sent to the ED for evaluation. In ER she was placed on BiPAP and received fluconazole and aztreonam.     1. Acute on chronic respiratory failure improving. LLL pneumonia. Multifocal pneumonia. COPD exacerbation. Recent fungemia. Allergy to PCN, cefepime, avelox.   -leukocytosis  -respiratory improving  -repeat COVID-PCR detected again in respiratory secretions on 12/14, and before on 11/24  -BC x 2, urine c/s, sputum c/s noted  -on meropenem 1 gm IV q8h # 6-7 and fluconazole 400 mg IV qd  -tolerating abx well so far; no side effects noted  -monitor closely in donnie of PCN allergy history  -respiratory care  -change fluconazole to PO  -continue abx coverage with suraj for one more day  -monitor temps  -f/u CBC  -supportive care  2. Other issues:   -care per medicine      
73 y/o female with h/o adrenal insufficiency, aortic insufficiency, asthma, A.fib on Eliquis, colorectal cancer s/p resection with colostomy bag, COPD, DM, DVT, pelvic fracture, rectal bleeding, seizure, TIA, tracheobronchomalacia, type I aortic dissection s/p Bentall procedure and hemiarch replacement 9/6 with Dr. Cabrera, recent COVID-19 viral syndrome (Nov 2022) s/p remdesivir was admitted on 12/14 from [facility] for worsening SOB since last night. Pt was found to be hypoxic at [facility] and sent to the ED for evaluation. In ER she was placed on BiPAP and received fluconazole and aztreonam.     1. Acute on chronic respiratory failure. LLL pneumonia. Multifocal pneumonia. COPD exacerbation. Recent fungemia. Allergy to PCN, cefepime, avelox.   -leukocytosis  -respiratory improving  -repeat COVID-PCR detected again in respiratory secretions on 12/14, and before on 11/24  -BC x 2, urine c/s, sputum c/s noted  -on meropenem 1 gm IV q8h # 4 and fluconazole 400 mg IV qd  -tolerating abx well so far; no side effects noted  -monitor closely in donnie of PCN allergy history  -respiratory care  -continue abx coverage   -monitor temps  -f/u CBC  -supportive care  2. Other issues:   -care per medicine      
73 y/o female with h/o adrenal insufficiency, aortic insufficiency, asthma, A.fib on Eliquis, colorectal cancer s/p resection with colostomy bag, COPD, DM, DVT, pelvic fracture, rectal bleeding, seizure, TIA, tracheobronchomalacia, type I aortic dissection s/p Bentall procedure and hemiarch replacement 9/6 with Dr. Cabrera, recent COVID-19 viral syndrome (Nov 2022) s/p remdesivir was admitted on 12/14 from [facility] for worsening SOB since last night. Pt was found to be hypoxic at [facility] and sent to the ED for evaluation. In ER she was placed on BiPAP and received fluconazole and aztreonam.     1. Acute on chronic respiratory failure improving. LLL pneumonia. Multifocal pneumonia. COPD exacerbation. Recent fungemia. Allergy to PCN, cefepime, avelox.   -leukocytosis  -respiratory improving  -repeat COVID-PCR detected again in respiratory secretions on 12/14, and before on 11/24  -BC x 2, urine c/s, sputum c/s noted  -on meropenem 1 gm IV q8h # 5 and fluconazole 400 mg IV qd  -tolerating abx well so far; no side effects noted  -monitor closely in donnie of PCN allergy history  -respiratory care  -continue abx coverage   -monitor temps  -f/u CBC  -supportive care  2. Other issues:   -care per medicine      
73 y/o female with h/o adrenal insufficiency, aortic insufficiency, asthma, A.fib on Eliquis, colorectal cancer s/p resection with colostomy bag, COPD, DM, DVT, pelvic fracture, rectal bleeding, seizure, TIA, tracheobronchomalacia, type I aortic dissection s/p Bentall procedure and hemiarch replacement 9/6 with Dr. Cabrera, recent COVID-19 viral syndrome (Nov 2022) s/p remdesivir was admitted on 12/14 from [facility] for worsening SOB since last night. Pt was found to be hypoxic at [facility] and sent to the ED for evaluation. In ER she was placed on BiPAP and received fluconazole and aztreonam.     1. Acute on chronic respiratory failure. LLL pneumonia. Multifocal pneumonia. COPD exacerbation. Recent fungemia. Allergy to PCN, cefepime, avelox.   -leukocytosis  -repeat COVID-PCR detected again in respiratory secretions on 12/14, before on 11/24  -BC x 2, urine c/s, sputum c/s noted  -on meropenem 1 gm IV q8h # 3 and fluconazole 400 mg IV qd  -tolerating abx well so far; no side effects noted  -monitor closely in donnie of PCN allergy history  -respiratory care  -continue abx coverage   -monitor temps  -f/u CBC  -supportive care  2. Other issues:   -care per medicine      
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 No soft tissue emphysema noted.   Applied allyvan pad to BLE  WC: SSD with DSD  Continue with IV antibiotics As Per ID/Med  Left heel wound small, superficial in nature, no PTB, no purulence, no fluctuance, stable. Hemorraghic bullae to medial R midfoot and ankle are small and stable without active discharge. No acute podiatric intervention at this time. Podiatry will sign off with nursing orders at this time. Please re-consult as needed, thank you.   Recommend offloading of bilateral Heels while bedbound with Z flex heel boots  Patient tolerated interventions well without any complications.   Podiatry will follow while in house.  Case D/W Dr. Coleman    Wound care changes daily to bilateral feet:   -Please carefully remove allyvan pad from Left heel and medial part of Right foot  -Please apply Silver Sulfadiazine to Left heel wound and Right foot hemorraghic bullae medial aspect.   -Then apply allyvan pad to Left heel and medial aspect of Right foot  -Please keep BLE offloaded while patient is bedbound with offloading Z boots  Thank you
73 y/o female with h/o adrenal insufficiency, aortic insufficiency, asthma, A.fib on Eliquis, colorectal cancer s/p resection with colostomy bag, COPD, DM, DVT, pelvic fracture, rectal bleeding, seizure, TIA, tracheobronchomalacia, type I aortic dissection s/p Bentall procedure and hemiarch replacement 9/6 with Dr. Cabrera, recent COVID-19 viral syndrome (Nov 2022) s/p remdesivir was admitted on 12/14 from [facility] for worsening SOB since last night. Pt was found to be hypoxic at [facility] and sent to the ED for evaluation. In ER she was placed on BiPAP and received fluconazole and aztreonam.     1. Acute on chronic respiratory failure. LLL pneumonia. Multifocal pneumonia. COPD exacerbation. Allergy to PCN, cefepime, avelox.   -leukocytosis  -repeat COVID-PCR detected again in respiratory secretions on 12/14, before on 11/24  -BC x 2, urine c/s, sputum c/s noted  -on meropenem 1 gm IV q8h # 2 and fluconazole 400 mg IV qd  -reason for abx use and side effects reviewed with patient; monitor BMP   -monitor closely in donnie of PCN allergy history  -respiratory care  -continue abx coverage   -monitor temps  -f/u CBC  -supportive care  2. Other issues:   -care per medicine

## 2022-12-21 NOTE — PROGRESS NOTE ADULT - PROVIDER SPECIALTY LIST ADULT
Infectious Disease
Infectious Disease
Podiatry
Critical Care
Infectious Disease
Critical Care
Critical Care
Hospitalist
Hospitalist
Critical Care
Hospitalist
Hospitalist

## 2022-12-22 ENCOUNTER — TRANSCRIPTION ENCOUNTER (OUTPATIENT)
Age: 72
End: 2022-12-22

## 2022-12-22 VITALS
HEART RATE: 73 BPM | OXYGEN SATURATION: 99 % | DIASTOLIC BLOOD PRESSURE: 53 MMHG | RESPIRATION RATE: 16 BRPM | SYSTOLIC BLOOD PRESSURE: 100 MMHG | WEIGHT: 133.38 LBS

## 2022-12-22 LAB
ALBUMIN SERPL ELPH-MCNC: 2 G/DL — LOW (ref 3.3–5)
ALP SERPL-CCNC: 78 U/L — SIGNIFICANT CHANGE UP (ref 40–120)
ALT FLD-CCNC: 20 U/L — SIGNIFICANT CHANGE UP (ref 12–78)
ANION GAP SERPL CALC-SCNC: 5 MMOL/L — SIGNIFICANT CHANGE UP (ref 5–17)
AST SERPL-CCNC: 15 U/L — SIGNIFICANT CHANGE UP (ref 15–37)
BASOPHILS # BLD AUTO: 0.01 K/UL — SIGNIFICANT CHANGE UP (ref 0–0.2)
BASOPHILS NFR BLD AUTO: 0.2 % — SIGNIFICANT CHANGE UP (ref 0–2)
BILIRUB SERPL-MCNC: 0.2 MG/DL — SIGNIFICANT CHANGE UP (ref 0.2–1.2)
BUN SERPL-MCNC: 14 MG/DL — SIGNIFICANT CHANGE UP (ref 7–23)
CALCIUM SERPL-MCNC: 8.5 MG/DL — SIGNIFICANT CHANGE UP (ref 8.5–10.1)
CHLORIDE SERPL-SCNC: 107 MMOL/L — SIGNIFICANT CHANGE UP (ref 96–108)
CO2 SERPL-SCNC: 28 MMOL/L — SIGNIFICANT CHANGE UP (ref 22–31)
CREAT SERPL-MCNC: 0.46 MG/DL — LOW (ref 0.5–1.3)
EGFR: 100 ML/MIN/1.73M2 — SIGNIFICANT CHANGE UP
EOSINOPHIL # BLD AUTO: 0.18 K/UL — SIGNIFICANT CHANGE UP (ref 0–0.5)
EOSINOPHIL NFR BLD AUTO: 3.2 % — SIGNIFICANT CHANGE UP (ref 0–6)
GLUCOSE BLDC GLUCOMTR-MCNC: 112 MG/DL — HIGH (ref 70–99)
GLUCOSE BLDC GLUCOMTR-MCNC: 139 MG/DL — HIGH (ref 70–99)
GLUCOSE SERPL-MCNC: 151 MG/DL — HIGH (ref 70–99)
HCT VFR BLD CALC: 36.6 % — SIGNIFICANT CHANGE UP (ref 34.5–45)
HGB BLD-MCNC: 11.2 G/DL — LOW (ref 11.5–15.5)
IMM GRANULOCYTES NFR BLD AUTO: 4.8 % — HIGH (ref 0–0.9)
LYMPHOCYTES # BLD AUTO: 1.36 K/UL — SIGNIFICANT CHANGE UP (ref 1–3.3)
LYMPHOCYTES # BLD AUTO: 23.9 % — SIGNIFICANT CHANGE UP (ref 13–44)
MAGNESIUM SERPL-MCNC: 2 MG/DL — SIGNIFICANT CHANGE UP (ref 1.6–2.6)
MCHC RBC-ENTMCNC: 22.3 PG — LOW (ref 27–34)
MCHC RBC-ENTMCNC: 30.6 GM/DL — LOW (ref 32–36)
MCV RBC AUTO: 72.9 FL — LOW (ref 80–100)
MONOCYTES # BLD AUTO: 0.46 K/UL — SIGNIFICANT CHANGE UP (ref 0–0.9)
MONOCYTES NFR BLD AUTO: 8.1 % — SIGNIFICANT CHANGE UP (ref 2–14)
NEUTROPHILS # BLD AUTO: 3.4 K/UL — SIGNIFICANT CHANGE UP (ref 1.8–7.4)
NEUTROPHILS NFR BLD AUTO: 59.8 % — SIGNIFICANT CHANGE UP (ref 43–77)
NRBC # BLD: 2 /100 WBCS — HIGH (ref 0–0)
PHOSPHATE SERPL-MCNC: 2.1 MG/DL — LOW (ref 2.5–4.5)
PLATELET # BLD AUTO: 232 K/UL — SIGNIFICANT CHANGE UP (ref 150–400)
POTASSIUM SERPL-MCNC: 4.1 MMOL/L — SIGNIFICANT CHANGE UP (ref 3.5–5.3)
POTASSIUM SERPL-SCNC: 4.1 MMOL/L — SIGNIFICANT CHANGE UP (ref 3.5–5.3)
PROT SERPL-MCNC: 5.5 GM/DL — LOW (ref 6–8.3)
RBC # BLD: 5.02 M/UL — SIGNIFICANT CHANGE UP (ref 3.8–5.2)
RBC # FLD: 18.1 % — HIGH (ref 10.3–14.5)
SODIUM SERPL-SCNC: 140 MMOL/L — SIGNIFICANT CHANGE UP (ref 135–145)
WBC # BLD: 5.68 K/UL — SIGNIFICANT CHANGE UP (ref 3.8–10.5)
WBC # FLD AUTO: 5.68 K/UL — SIGNIFICANT CHANGE UP (ref 3.8–10.5)

## 2022-12-22 PROCEDURE — 99239 HOSP IP/OBS DSCHRG MGMT >30: CPT

## 2022-12-22 RX ORDER — INSULIN LISPRO 100/ML
0 VIAL (ML) SUBCUTANEOUS
Qty: 0 | Refills: 0 | DISCHARGE

## 2022-12-22 RX ORDER — APIXABAN 2.5 MG/1
1 TABLET, FILM COATED ORAL
Qty: 0 | Refills: 0 | DISCHARGE
Start: 2022-12-22

## 2022-12-22 RX ORDER — MEXILETINE HYDROCHLORIDE 150 MG/1
1 CAPSULE ORAL
Qty: 0 | Refills: 0 | DISCHARGE
Start: 2022-12-22

## 2022-12-22 RX ORDER — SODIUM,POTASSIUM PHOSPHATES 278-250MG
1 POWDER IN PACKET (EA) ORAL
Refills: 0 | Status: DISCONTINUED | OUTPATIENT
Start: 2022-12-22 | End: 2022-12-22

## 2022-12-22 RX ORDER — METOPROLOL TARTRATE 50 MG
0.5 TABLET ORAL
Qty: 0 | Refills: 0 | DISCHARGE

## 2022-12-22 RX ORDER — SODIUM,POTASSIUM PHOSPHATES 278-250MG
1 POWDER IN PACKET (EA) ORAL
Qty: 0 | Refills: 0 | DISCHARGE
Start: 2022-12-22

## 2022-12-22 RX ORDER — INSULIN LISPRO 100/ML
5 VIAL (ML) SUBCUTANEOUS
Qty: 0 | Refills: 0 | DISCHARGE

## 2022-12-22 RX ORDER — ACETAMINOPHEN 500 MG
650 TABLET ORAL ONCE
Refills: 0 | Status: COMPLETED | OUTPATIENT
Start: 2022-12-22 | End: 2022-12-22

## 2022-12-22 RX ORDER — LACTULOSE 10 G/15ML
15 SOLUTION ORAL
Qty: 0 | Refills: 0 | DISCHARGE
Start: 2022-12-22

## 2022-12-22 RX ADMIN — MEXILETINE HYDROCHLORIDE 200 MILLIGRAM(S): 150 CAPSULE ORAL at 13:18

## 2022-12-22 RX ADMIN — Medication 650 MILLIGRAM(S): at 04:31

## 2022-12-22 RX ADMIN — Medication 8 MILLIGRAM(S): at 10:35

## 2022-12-22 RX ADMIN — Medication 650 MILLIGRAM(S): at 03:48

## 2022-12-22 RX ADMIN — Medication 1 PACKET(S): at 14:00

## 2022-12-22 RX ADMIN — MEROPENEM 100 MILLIGRAM(S): 1 INJECTION INTRAVENOUS at 06:00

## 2022-12-22 RX ADMIN — FLUCONAZOLE 400 MILLIGRAM(S): 150 TABLET ORAL at 13:18

## 2022-12-22 RX ADMIN — MEXILETINE HYDROCHLORIDE 200 MILLIGRAM(S): 150 CAPSULE ORAL at 06:00

## 2022-12-22 RX ADMIN — CHLORHEXIDINE GLUCONATE 1 APPLICATION(S): 213 SOLUTION TOPICAL at 06:04

## 2022-12-22 RX ADMIN — Medication 1 TABLET(S): at 10:36

## 2022-12-22 RX ADMIN — Medication 1 APPLICATION(S): at 12:31

## 2022-12-22 RX ADMIN — Medication 5 UNIT(S): at 08:31

## 2022-12-22 RX ADMIN — SODIUM CHLORIDE 500 MILLILITER(S): 9 INJECTION INTRAMUSCULAR; INTRAVENOUS; SUBCUTANEOUS at 00:07

## 2022-12-22 RX ADMIN — Medication 5 UNIT(S): at 12:31

## 2022-12-22 RX ADMIN — ALBUTEROL 2.5 MILLIGRAM(S): 90 AEROSOL, METERED ORAL at 08:39

## 2022-12-22 RX ADMIN — APIXABAN 5 MILLIGRAM(S): 2.5 TABLET, FILM COATED ORAL at 10:35

## 2022-12-22 RX ADMIN — PANTOPRAZOLE SODIUM 40 MILLIGRAM(S): 20 TABLET, DELAYED RELEASE ORAL at 10:35

## 2022-12-22 NOTE — DISCHARGE NOTE PROVIDER - NSDCMRMEDTOKEN_GEN_ALL_CORE_FT
acetaminophen 500 mg oral tablet: 1 tab(s) orally every 4 hours, As Needed s needed for pain, fever &gt; 101  acetylcysteine 10% inhalation solution: 4 milliliter(s) inhaled every 12 hours  Admelog SoloStar 100 units/mL injectable solution: 5 unit(s) subcutaneously with meals  Admelog SoloStar 100 units/mL injectable solution: Inject as per sliding scale:  &lt; 60 = 0 units  201-250 = 2 units  251-300 = 4 units  301-350 = 6 units  351-400 = 8 units  &gt; 400 = 0 units, call MD  apixaban 5 mg oral tablet: 1 tab(s) orally 2 times a day  ascorbic acid 500 mg oral tablet: 1 tab(s) orally once a day  budesonide 0.5 mg/2 mL inhalation suspension: 2 milliliter(s) inhaled 2 times a day  Crestor 5 mg oral tablet: 1 tab(s) orally once a day (at bedtime)  fluconazole 200 mg oral tablet: 2 tab(s) orally once a day  insulin glargine 100 units/mL subcutaneous solution: 18 unit(s) subcutaneous once a day (at bedtime)  ipratropium-albuterol 0.5 mg-2.5 mg/3 mL inhalation solution: 3 milliliter(s) inhaled every 6 hours  lactulose 10 g/15 mL oral syrup: 15 milliliter(s) orally 2 times a day, As needed, constipation  magnesium oxide 400 mg oral tablet: 1 tab(s) orally every 8 hours  Medrol 8 mg oral tablet: 1 tab(s) orally once a day  mexiletine 200 mg oral capsule: 1 cap(s) orally every 8 hours  MiraLax oral powder for reconstitution: 17 gram(s) orally 2 times a day  Multiple Vitamins oral tablet: 1 tab(s) orally once a day  mupirocin 2% topical ointment: Apply topically to left heel and right ankle every day shift, cleanse with NS, apply tx, dcd and kerlex  nystatin 100,000 units/g topical powder: Apply topically to groin/under B/L breast every day and evening shift for fungal rash, cleanse area with NS, pat dry and apply powder  omeprazole 20 mg oral delayed release tablet: 1 tab(s) orally once a day  silver sulfADIAZINE 1% topical cream: Apply topically every day and evening shift for wound care, cleanse with NS, apply tx and DCD with kerlex  sodium/potassium/phosphorus 160 mg-280 mg-250 mg oral powder for reconstitution: 1 packet(s) orally 3 times a day (with meals) for 2 days  sulfamethoxazole-trimethoprim 400 mg-80 mg oral tablet: 1 tab(s) orally once a day   acetaminophen 500 mg oral tablet: 1 tab(s) orally every 4 hours, As Needed s needed for pain, fever &gt; 101  acetylcysteine 10% inhalation solution: 4 milliliter(s) inhaled every 12 hours  Admelog SoloStar 100 units/mL injectable solution: 5 unit(s) subcutaneously 3 times a day with meals. Hold if not eating  Admelog SoloStar 100 units/mL injectable solution: Inject as per sliding scale:  &lt; 60 = 0 units  201-250 = 2 units  251-300 = 4 units  301-350 = 6 units  351-400 = 8 units  &gt; 400 = 0 units, call MD  apixaban 5 mg oral tablet: 1 tab(s) orally 2 times a day  ascorbic acid 500 mg oral tablet: 1 tab(s) orally once a day  budesonide 0.5 mg/2 mL inhalation suspension: 2 milliliter(s) inhaled 2 times a day  Crestor 5 mg oral tablet: 1 tab(s) orally once a day (at bedtime)  fluconazole 200 mg oral tablet: 2 tab(s) orally once a day  insulin glargine 100 units/mL subcutaneous solution: 18 unit(s) subcutaneous once a day (at bedtime)  ipratropium-albuterol 0.5 mg-2.5 mg/3 mL inhalation solution: 3 milliliter(s) inhaled every 6 hours  lactulose 10 g/15 mL oral syrup: 15 milliliter(s) orally 2 times a day, As needed, constipation  magnesium oxide 400 mg oral tablet: 1 tab(s) orally every 8 hours  Medrol 8 mg oral tablet: 1 tab(s) orally once a day  metoprolol tartrate 25 mg oral tablet: 0.5 tab(s) orally 2 times a day; Hold for SBP less than 100 and/or heart rate less than 60  mexiletine 200 mg oral capsule: 1 cap(s) orally every 8 hours  MiraLax oral powder for reconstitution: 17 gram(s) orally 2 times a day  Multiple Vitamins oral tablet: 1 tab(s) orally once a day  mupirocin 2% topical ointment: Apply topically to left heel and right ankle every day shift, cleanse with NS, apply tx, dcd and kerlex  nystatin 100,000 units/g topical powder: Apply topically to groin/under B/L breast every day and evening shift for fungal rash, cleanse area with NS, pat dry and apply powder  omeprazole 20 mg oral delayed release tablet: 1 tab(s) orally once a day  silver sulfADIAZINE 1% topical cream: Apply topically every day and evening shift for wound care, cleanse with NS, apply tx and DCD with kerlex  sodium/potassium/phosphorus 160 mg-280 mg-250 mg oral powder for reconstitution: 1 packet(s) orally 3 times a day (with meals) for 2 days  sulfamethoxazole-trimethoprim 400 mg-80 mg oral tablet: 1 tab(s) orally once a day

## 2022-12-22 NOTE — DISCHARGE NOTE PROVIDER - NSDCCAREPROVSEEN_GEN_ALL_CORE_FT
[place], Abraham Rutherford, Huseyin Vergara, Pepito Fernandez, Eric Morrison, Terrell Coleman, Yaneli Braden, Shola De Souza, Alexia Cooper, Tavo STARK

## 2022-12-22 NOTE — DISCHARGE NOTE PROVIDER - NSDCCPCAREPLAN_GEN_ALL_CORE_FT
PRINCIPAL DISCHARGE DIAGNOSIS  Diagnosis: Sepsis with acute hypoxic respiratory failure  Assessment and Plan of Treatment: Completed course of antibiotics.   Upper respiratory symptoms can linger around longer than active infection. Continue medications for symptom control  Close follow up outpatient with primary medical doctor advised.       PRINCIPAL DISCHARGE DIAGNOSIS  Diagnosis: Sepsis with acute hypoxic respiratory failure  Assessment and Plan of Treatment: Completed course of antibiotics.   Upper respiratory symptoms can linger around longer than active infection. Continue medications for symptom control  Close follow up outpatient with primary medical doctor advised.      SECONDARY DISCHARGE DIAGNOSES  Diagnosis: Fungal disease  Assessment and Plan of Treatment: You presented to the hospital on fluconazole. We did not receive any records as to the indication and/or duration of treatment. Continue taking as instructed and follow up with the initially prescribing provider or managing provider of your fluconazole treatment for further evaluation and titration of medication.

## 2022-12-22 NOTE — DISCHARGE NOTE NURSING/CASE MANAGEMENT/SOCIAL WORK - PATIENT PORTAL LINK FT
You can access the FollowMyHealth Patient Portal offered by [facility] by registering at the following website: [email or web address]. By joining [employer]’s FollowMyHealth portal, you will also be able to view your health information using other applications (apps) compatible with our system.

## 2022-12-22 NOTE — DISCHARGE NOTE PROVIDER - NSDCFUSCHEDAPPT_GEN_ALL_CORE_FT
Genesis Aragon  [facility] Physician [facility]  ELECTROPH [id number]  Scheduled Appointment: 01/26/2023    Rico Glover  [facility]  CARDIOLOGY [id number]  Scheduled Appointment: 01/26/2023

## 2022-12-22 NOTE — DISCHARGE NOTE PROVIDER - DETAILS OF MALNUTRITION DIAGNOSIS/DIAGNOSES
This patient has been assessed with a concern for Malnutrition and was treated during this hospitalization for the following Nutrition diagnosis/diagnoses:     -  12/15/2022: Severe protein-calorie malnutrition

## 2022-12-22 NOTE — DISCHARGE NOTE PROVIDER - HOSPITAL COURSE
FROM H&P:  PMHx PMHx of adrenal insufficiency, aortic insufficiency, asthma, Afib on Eliquis, colorectal cancer s/p resection with colostomy bag, COPD, DM, DVT, pelvic fracture, rectal bleeding, seizure, TIA, tracheobronchomalacia, type I aortic dissection s/p Bentall procedure and hemiarch replacement 9/6 with Dr. Cabrera presents to the ED BIBA from [facility] for worsening SOB since last night. Pt was found to be hypoxic at [facility] and sent to the ED for evaluation. Pt recently admitted for COVID and RDV Tx at [place].    Placed on bipap, ICU consulted.     12/14 PM: On bipap. CXR concerning for PNA. On abx. Multiple abx allergies noted.    Assessment/Plan    Sepsis POA and Acute resp failure due to Pna  Recurrent Pna HCAP; unable to clinically determine type of bacteria for the pneumonia  resolving   Completed course of merrem (7 days)  Respiratory status stable  Cleared for discahrge  c/w supplemental O2  Albuterol, Mucinex, Chest PT    hx of Fungal infection NOS  -on Fluconazole chronically prior to admission.  -Discussed with ID. Unclear as to the history and the circumstances as those records did not present with the patient. Patient is to continue fluconazole as ordered by the initially order provider and/or managing provider at NH and patient is to follow up with them for further evaluation and management.     c/w Bactrim prophylaxis     Adrenal Insufficiency   -c/w Medrol outpatient dose     DM type II  Continue to titrate insulin accordingly for adequate glucose control    Paroxysmal Afib:  -c/w Apixaban  -c/w Lopressor , Mexiletine     Patient medically stable for discharge back to [facility] and close outpatient follow up outpatient  T(C): 36.6 (12-22-22 @ 08:17), Max: 36.9 (12-21-22 @ 16:00)  HR: 73 (12-22-22 @ 10:30) (64 - 81)  BP: 100/53 (12-22-22 @ 10:30) (86/46 - 136/76)  RR: 16 (12-22-22 @ 10:30) (16 - 18)  SpO2: 99% (12-22-22 @ 10:30) (94% - 100%)  AAOx2-3; NAD  Mildly decreased breath sounds bilaterally; Normal respiratory effort  RRR  Discharge Management: 38 minutes  Date of Discharge/Service: 12/22/2022

## 2022-12-22 NOTE — DISCHARGE NOTE NURSING/CASE MANAGEMENT/SOCIAL WORK - NSDCPEFALRISK_GEN_ALL_CORE
For information on Fall & Injury Prevention, visit: [email or web address] OR  [email or web address] OR  https://www.cdc.gov/steadi/patient.html

## 2022-12-22 NOTE — DISCHARGE NOTE PROVIDER - CARE PROVIDER_API CALL
[place])  Internal Medicine  [address]  [place] [address]  Phone: [phone]  Fax: [phone]  Established Patient  Follow Up Time: 1 week

## 2022-12-22 NOTE — DISCHARGE NOTE NURSING/CASE MANAGEMENT/SOCIAL WORK - NSDCVIVACCINE_GEN_ALL_CORE_FT
COVID-19, mRNA, LNP-S, PF, 100 mcg/ 0.5 mL dose (Moderna); 06-Dec-2021 17:12; Afshan Lew (RADHA); [employer] [id number] (Exp. Date: 22-Dec-2021); IntraMuscular; Deltoid Left.; 0.25 milliLiter(s);

## 2022-12-27 ENCOUNTER — NON-APPOINTMENT (OUTPATIENT)
Age: 72
End: 2022-12-27

## 2022-12-28 DIAGNOSIS — Z79.52 LONG TERM (CURRENT) USE OF SYSTEMIC STEROIDS: ICD-10-CM

## 2022-12-28 DIAGNOSIS — A41.9 SEPSIS, UNSPECIFIED ORGANISM: ICD-10-CM

## 2022-12-28 DIAGNOSIS — U07.1 COVID-19: ICD-10-CM

## 2022-12-28 DIAGNOSIS — J15.9 UNSPECIFIED BACTERIAL PNEUMONIA: ICD-10-CM

## 2022-12-28 DIAGNOSIS — Z95.2 PRESENCE OF PROSTHETIC HEART VALVE: ICD-10-CM

## 2022-12-28 DIAGNOSIS — J44.1 CHRONIC OBSTRUCTIVE PULMONARY DISEASE WITH (ACUTE) EXACERBATION: ICD-10-CM

## 2022-12-28 DIAGNOSIS — L89.016: ICD-10-CM

## 2022-12-28 DIAGNOSIS — E27.40 UNSPECIFIED ADRENOCORTICAL INSUFFICIENCY: ICD-10-CM

## 2022-12-28 DIAGNOSIS — Z88.5 ALLERGY STATUS TO NARCOTIC AGENT: ICD-10-CM

## 2022-12-28 DIAGNOSIS — Z88.7 ALLERGY STATUS TO SERUM AND VACCINE: ICD-10-CM

## 2022-12-28 DIAGNOSIS — Z90.49 ACQUIRED ABSENCE OF OTHER SPECIFIED PARTS OF DIGESTIVE TRACT: ICD-10-CM

## 2022-12-28 DIAGNOSIS — I35.1 NONRHEUMATIC AORTIC (VALVE) INSUFFICIENCY: ICD-10-CM

## 2022-12-28 DIAGNOSIS — Z90.710 ACQUIRED ABSENCE OF BOTH CERVIX AND UTERUS: ICD-10-CM

## 2022-12-28 DIAGNOSIS — E11.65 TYPE 2 DIABETES MELLITUS WITH HYPERGLYCEMIA: ICD-10-CM

## 2022-12-28 DIAGNOSIS — R23.8 OTHER SKIN CHANGES: ICD-10-CM

## 2022-12-28 DIAGNOSIS — J44.0 CHRONIC OBSTRUCTIVE PULMONARY DISEASE WITH (ACUTE) LOWER RESPIRATORY INFECTION: ICD-10-CM

## 2022-12-28 DIAGNOSIS — B49 UNSPECIFIED MYCOSIS: ICD-10-CM

## 2022-12-28 DIAGNOSIS — Z91.013 ALLERGY TO SEAFOOD: ICD-10-CM

## 2022-12-28 DIAGNOSIS — S91.301A UNSPECIFIED OPEN WOUND, RIGHT FOOT, INITIAL ENCOUNTER: ICD-10-CM

## 2022-12-28 DIAGNOSIS — Z86.718 PERSONAL HISTORY OF OTHER VENOUS THROMBOSIS AND EMBOLISM: ICD-10-CM

## 2022-12-28 DIAGNOSIS — E43 UNSPECIFIED SEVERE PROTEIN-CALORIE MALNUTRITION: ICD-10-CM

## 2022-12-28 DIAGNOSIS — I48.0 PAROXYSMAL ATRIAL FIBRILLATION: ICD-10-CM

## 2022-12-28 DIAGNOSIS — Z85.038 PERSONAL HISTORY OF OTHER MALIGNANT NEOPLASM OF LARGE INTESTINE: ICD-10-CM

## 2022-12-28 DIAGNOSIS — Z93.3 COLOSTOMY STATUS: ICD-10-CM
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
## 2022-12-28 DIAGNOSIS — S91.302A UNSPECIFIED OPEN WOUND, LEFT FOOT, INITIAL ENCOUNTER: ICD-10-CM

## 2022-12-28 DIAGNOSIS — Z88.6 ALLERGY STATUS TO ANALGESIC AGENT: ICD-10-CM

## 2022-12-28 DIAGNOSIS — Z79.01 LONG TERM (CURRENT) USE OF ANTICOAGULANTS: ICD-10-CM

## 2022-12-28 DIAGNOSIS — Z88.1 ALLERGY STATUS TO OTHER ANTIBIOTIC AGENTS STATUS: ICD-10-CM

## 2022-12-28 DIAGNOSIS — R65.20 SEVERE SEPSIS WITHOUT SEPTIC SHOCK: ICD-10-CM

## 2022-12-28 DIAGNOSIS — Z92.3 PERSONAL HISTORY OF IRRADIATION: ICD-10-CM

## 2022-12-28 DIAGNOSIS — L89.156 PRESSURE-INDUCED DEEP TISSUE DAMAGE OF SACRAL REGION: ICD-10-CM

## 2022-12-28 DIAGNOSIS — Z88.0 ALLERGY STATUS TO PENICILLIN: ICD-10-CM

## 2022-12-28 DIAGNOSIS — Z92.21 PERSONAL HISTORY OF ANTINEOPLASTIC CHEMOTHERAPY: ICD-10-CM

## 2022-12-28 DIAGNOSIS — Z86.73 PERSONAL HISTORY OF TRANSIENT ISCHEMIC ATTACK (TIA), AND CEREBRAL INFARCTION WITHOUT RESIDUAL DEFICITS: ICD-10-CM

## 2022-12-28 DIAGNOSIS — Z96.653 PRESENCE OF ARTIFICIAL KNEE JOINT, BILATERAL: ICD-10-CM

## 2022-12-28 DIAGNOSIS — J96.21 ACUTE AND CHRONIC RESPIRATORY FAILURE WITH HYPOXIA: ICD-10-CM

## 2023-01-02 ENCOUNTER — EMERGENCY (EMERGENCY)
Facility: HOSPITAL | Age: 73
LOS: 0 days | Discharge: OTHER INSTITUTION | End: 2023-01-03
Attending: EMERGENCY MEDICINE
Payer: MEDICARE

## 2023-01-02 VITALS
RESPIRATION RATE: 22 BRPM | HEIGHT: 67 IN | HEART RATE: 100 BPM | OXYGEN SATURATION: 95 % | WEIGHT: 179.9 LBS | DIASTOLIC BLOOD PRESSURE: 117 MMHG | SYSTOLIC BLOOD PRESSURE: 161 MMHG | TEMPERATURE: 98 F

## 2023-01-02 DIAGNOSIS — Z98.890 OTHER SPECIFIED POSTPROCEDURAL STATES: Chronic | ICD-10-CM

## 2023-01-02 DIAGNOSIS — Z91.041 RADIOGRAPHIC DYE ALLERGY STATUS: ICD-10-CM

## 2023-01-02 DIAGNOSIS — Z85.038 PERSONAL HISTORY OF OTHER MALIGNANT NEOPLASM OF LARGE INTESTINE: ICD-10-CM

## 2023-01-02 DIAGNOSIS — Z90.710 ACQUIRED ABSENCE OF BOTH CERVIX AND UTERUS: ICD-10-CM

## 2023-01-02 DIAGNOSIS — Z88.6 ALLERGY STATUS TO ANALGESIC AGENT: ICD-10-CM

## 2023-01-02 DIAGNOSIS — K62.5 HEMORRHAGE OF ANUS AND RECTUM: Chronic | ICD-10-CM

## 2023-01-02 DIAGNOSIS — Z86.718 PERSONAL HISTORY OF OTHER VENOUS THROMBOSIS AND EMBOLISM: ICD-10-CM

## 2023-01-02 DIAGNOSIS — J18.9 PNEUMONIA, UNSPECIFIED ORGANISM: ICD-10-CM

## 2023-01-02 DIAGNOSIS — Z88.7 ALLERGY STATUS TO SERUM AND VACCINE: ICD-10-CM

## 2023-01-02 DIAGNOSIS — E11.9 TYPE 2 DIABETES MELLITUS WITHOUT COMPLICATIONS: ICD-10-CM

## 2023-01-02 DIAGNOSIS — R10.84 GENERALIZED ABDOMINAL PAIN: ICD-10-CM

## 2023-01-02 DIAGNOSIS — I48.0 PAROXYSMAL ATRIAL FIBRILLATION: ICD-10-CM

## 2023-01-02 DIAGNOSIS — Z98.89 OTHER SPECIFIED POSTPROCEDURAL STATES: Chronic | ICD-10-CM

## 2023-01-02 DIAGNOSIS — Z86.73 PERSONAL HISTORY OF TRANSIENT ISCHEMIC ATTACK (TIA), AND CEREBRAL INFARCTION WITHOUT RESIDUAL DEFICITS: ICD-10-CM

## 2023-01-02 DIAGNOSIS — R00.0 TACHYCARDIA, UNSPECIFIED: ICD-10-CM

## 2023-01-02 DIAGNOSIS — Z96.653 PRESENCE OF ARTIFICIAL KNEE JOINT, BILATERAL: Chronic | ICD-10-CM

## 2023-01-02 DIAGNOSIS — Z79.01 LONG TERM (CURRENT) USE OF ANTICOAGULANTS: ICD-10-CM

## 2023-01-02 DIAGNOSIS — Z88.0 ALLERGY STATUS TO PENICILLIN: ICD-10-CM

## 2023-01-02 DIAGNOSIS — Z91.013 ALLERGY TO SEAFOOD: ICD-10-CM

## 2023-01-02 DIAGNOSIS — Z88.5 ALLERGY STATUS TO NARCOTIC AGENT: ICD-10-CM

## 2023-01-02 DIAGNOSIS — Z96.653 PRESENCE OF ARTIFICIAL KNEE JOINT, BILATERAL: ICD-10-CM

## 2023-01-02 DIAGNOSIS — R11.10 VOMITING, UNSPECIFIED: ICD-10-CM

## 2023-01-02 DIAGNOSIS — Z88.1 ALLERGY STATUS TO OTHER ANTIBIOTIC AGENTS STATUS: ICD-10-CM

## 2023-01-02 DIAGNOSIS — Z87.09 PERSONAL HISTORY OF OTHER DISEASES OF THE RESPIRATORY SYSTEM: Chronic | ICD-10-CM

## 2023-01-02 DIAGNOSIS — F03.90 UNSPECIFIED DEMENTIA WITHOUT BEHAVIORAL DISTURBANCE: ICD-10-CM

## 2023-01-02 DIAGNOSIS — R50.9 FEVER, UNSPECIFIED: ICD-10-CM

## 2023-01-02 DIAGNOSIS — J44.9 CHRONIC OBSTRUCTIVE PULMONARY DISEASE, UNSPECIFIED: ICD-10-CM

## 2023-01-02 DIAGNOSIS — Z88.8 ALLERGY STATUS TO OTHER DRUGS, MEDICAMENTS AND BIOLOGICAL SUBSTANCES STATUS: ICD-10-CM

## 2023-01-02 DIAGNOSIS — I71.00 DISSECTION OF UNSPECIFIED SITE OF AORTA: ICD-10-CM

## 2023-01-02 DIAGNOSIS — Z79.4 LONG TERM (CURRENT) USE OF INSULIN: ICD-10-CM

## 2023-01-02 DIAGNOSIS — H05.352 EXOSTOSIS OF LEFT ORBIT: Chronic | ICD-10-CM

## 2023-01-02 PROCEDURE — 87186 SC STD MICRODIL/AGAR DIL: CPT

## 2023-01-02 PROCEDURE — 93005 ELECTROCARDIOGRAM TRACING: CPT

## 2023-01-02 PROCEDURE — G1004: CPT

## 2023-01-02 PROCEDURE — 85610 PROTHROMBIN TIME: CPT

## 2023-01-02 PROCEDURE — 96376 TX/PRO/DX INJ SAME DRUG ADON: CPT

## 2023-01-02 PROCEDURE — 0241U: CPT

## 2023-01-02 PROCEDURE — 87086 URINE CULTURE/COLONY COUNT: CPT

## 2023-01-02 PROCEDURE — 74177 CT ABD & PELVIS W/CONTRAST: CPT | Mod: MG

## 2023-01-02 PROCEDURE — 87040 BLOOD CULTURE FOR BACTERIA: CPT | Mod: 59

## 2023-01-02 PROCEDURE — 85025 COMPLETE CBC W/AUTO DIFF WBC: CPT

## 2023-01-02 PROCEDURE — 36415 COLL VENOUS BLD VENIPUNCTURE: CPT

## 2023-01-02 PROCEDURE — 81001 URINALYSIS AUTO W/SCOPE: CPT

## 2023-01-02 PROCEDURE — 99291 CRITICAL CARE FIRST HOUR: CPT

## 2023-01-02 PROCEDURE — 85730 THROMBOPLASTIN TIME PARTIAL: CPT

## 2023-01-02 PROCEDURE — 87077 CULTURE AEROBIC IDENTIFY: CPT

## 2023-01-02 PROCEDURE — 83605 ASSAY OF LACTIC ACID: CPT | Mod: 59

## 2023-01-02 PROCEDURE — 99291 CRITICAL CARE FIRST HOUR: CPT | Mod: 25

## 2023-01-02 PROCEDURE — 71045 X-RAY EXAM CHEST 1 VIEW: CPT

## 2023-01-02 PROCEDURE — 96365 THER/PROPH/DIAG IV INF INIT: CPT

## 2023-01-02 PROCEDURE — 80053 COMPREHEN METABOLIC PANEL: CPT

## 2023-01-02 PROCEDURE — 96375 TX/PRO/DX INJ NEW DRUG ADDON: CPT

## 2023-01-02 NOTE — ED ADULT TRIAGE NOTE - CHIEF COMPLAINT QUOTE
Patient BIBEMS from [facility], for fever, tachycardia and vomiting. Temp at home was 101.6, Tylenol given PTA. Hx dementia. Patient vomited twice in triage.

## 2023-01-03 ENCOUNTER — INPATIENT (INPATIENT)
Facility: HOSPITAL | Age: 73
LOS: 8 days | Discharge: NURSING, REHAB OR LONG-TERM CARE | DRG: 871 | End: 2023-01-12
Attending: INTERNAL MEDICINE | Admitting: HOSPITALIST
Payer: MEDICARE

## 2023-01-03 VITALS
OXYGEN SATURATION: 96 % | RESPIRATION RATE: 20 BRPM | SYSTOLIC BLOOD PRESSURE: 95 MMHG | DIASTOLIC BLOOD PRESSURE: 53 MMHG | HEART RATE: 116 BPM

## 2023-01-03 VITALS
DIASTOLIC BLOOD PRESSURE: 59 MMHG | HEART RATE: 107 BPM | SYSTOLIC BLOOD PRESSURE: 101 MMHG | WEIGHT: 133.82 LBS | HEIGHT: 67 IN | RESPIRATION RATE: 22 BRPM

## 2023-01-03 DIAGNOSIS — Z98.89 OTHER SPECIFIED POSTPROCEDURAL STATES: Chronic | ICD-10-CM

## 2023-01-03 DIAGNOSIS — A41.9 SEPSIS, UNSPECIFIED ORGANISM: ICD-10-CM

## 2023-01-03 DIAGNOSIS — Z98.890 OTHER SPECIFIED POSTPROCEDURAL STATES: Chronic | ICD-10-CM

## 2023-01-03 DIAGNOSIS — Z86.79 PERSONAL HISTORY OF OTHER DISEASES OF THE CIRCULATORY SYSTEM: ICD-10-CM

## 2023-01-03 DIAGNOSIS — Z96.653 PRESENCE OF ARTIFICIAL KNEE JOINT, BILATERAL: Chronic | ICD-10-CM

## 2023-01-03 DIAGNOSIS — Z87.09 PERSONAL HISTORY OF OTHER DISEASES OF THE RESPIRATORY SYSTEM: Chronic | ICD-10-CM

## 2023-01-03 DIAGNOSIS — H05.352 EXOSTOSIS OF LEFT ORBIT: Chronic | ICD-10-CM

## 2023-01-03 DIAGNOSIS — K62.5 HEMORRHAGE OF ANUS AND RECTUM: Chronic | ICD-10-CM

## 2023-01-03 LAB
ALBUMIN SERPL ELPH-MCNC: 3 G/DL — LOW (ref 3.3–5.2)
ALBUMIN SERPL ELPH-MCNC: 3.2 G/DL — LOW (ref 3.3–5)
ALP SERPL-CCNC: 112 U/L — SIGNIFICANT CHANGE UP (ref 40–120)
ALP SERPL-CCNC: 88 U/L — SIGNIFICANT CHANGE UP (ref 40–120)
ALT FLD-CCNC: 14 U/L — SIGNIFICANT CHANGE UP
ALT FLD-CCNC: 24 U/L — SIGNIFICANT CHANGE UP (ref 12–78)
ANION GAP SERPL CALC-SCNC: 10 MMOL/L — SIGNIFICANT CHANGE UP (ref 5–17)
ANION GAP SERPL CALC-SCNC: 14 MMOL/L — SIGNIFICANT CHANGE UP (ref 5–17)
ANISOCYTOSIS BLD QL: SIGNIFICANT CHANGE UP
ANISOCYTOSIS BLD QL: SLIGHT — SIGNIFICANT CHANGE UP
APPEARANCE UR: CLEAR — SIGNIFICANT CHANGE UP
APPEARANCE UR: CLEAR — SIGNIFICANT CHANGE UP
APTT BLD: 32.7 SEC — SIGNIFICANT CHANGE UP (ref 27.5–35.5)
APTT BLD: 34.6 SEC — SIGNIFICANT CHANGE UP (ref 27.5–35.5)
AST SERPL-CCNC: 27 U/L — SIGNIFICANT CHANGE UP
AST SERPL-CCNC: 30 U/L — SIGNIFICANT CHANGE UP (ref 15–37)
BACTERIA # UR AUTO: ABNORMAL
BACTERIA # UR AUTO: ABNORMAL
BASE EXCESS BLDV CALC-SCNC: -5.7 MMOL/L — LOW (ref -2–3)
BASOPHILS # BLD AUTO: 0.05 K/UL — SIGNIFICANT CHANGE UP (ref 0–0.2)
BASOPHILS # BLD AUTO: 0.13 K/UL — SIGNIFICANT CHANGE UP (ref 0–0.2)
BASOPHILS NFR BLD AUTO: 0.4 % — SIGNIFICANT CHANGE UP (ref 0–2)
BASOPHILS NFR BLD AUTO: 1 % — SIGNIFICANT CHANGE UP (ref 0–2)
BILIRUB SERPL-MCNC: 0.3 MG/DL — LOW (ref 0.4–2)
BILIRUB SERPL-MCNC: 0.4 MG/DL — SIGNIFICANT CHANGE UP (ref 0.2–1.2)
BILIRUB UR-MCNC: NEGATIVE — SIGNIFICANT CHANGE UP
BILIRUB UR-MCNC: NEGATIVE — SIGNIFICANT CHANGE UP
BUN SERPL-MCNC: 10.7 MG/DL — SIGNIFICANT CHANGE UP (ref 8–20)
BUN SERPL-MCNC: 14 MG/DL — SIGNIFICANT CHANGE UP (ref 7–23)
BURR CELLS BLD QL SMEAR: PRESENT — SIGNIFICANT CHANGE UP
CA-I SERPL-SCNC: 1.07 MMOL/L — LOW (ref 1.15–1.33)
CALCIUM SERPL-MCNC: 7.9 MG/DL — LOW (ref 8.4–10.5)
CALCIUM SERPL-MCNC: 9.6 MG/DL — SIGNIFICANT CHANGE UP (ref 8.5–10.1)
CHLORIDE BLDV-SCNC: 106 MMOL/L — SIGNIFICANT CHANGE UP (ref 96–108)
CHLORIDE SERPL-SCNC: 102 MMOL/L — SIGNIFICANT CHANGE UP (ref 96–108)
CHLORIDE SERPL-SCNC: 106 MMOL/L — SIGNIFICANT CHANGE UP (ref 96–108)
CO2 SERPL-SCNC: 20 MMOL/L — LOW (ref 22–29)
CO2 SERPL-SCNC: 25 MMOL/L — SIGNIFICANT CHANGE UP (ref 22–31)
COLOR SPEC: YELLOW — SIGNIFICANT CHANGE UP
COLOR SPEC: YELLOW — SIGNIFICANT CHANGE UP
CREAT SERPL-MCNC: 0.77 MG/DL — SIGNIFICANT CHANGE UP (ref 0.5–1.3)
CREAT SERPL-MCNC: 1.08 MG/DL — SIGNIFICANT CHANGE UP (ref 0.5–1.3)
DACRYOCYTES BLD QL SMEAR: SLIGHT — SIGNIFICANT CHANGE UP
DIFF PNL FLD: ABNORMAL
DIFF PNL FLD: ABNORMAL
EGFR: 54 ML/MIN/1.73M2 — LOW
EGFR: 81 ML/MIN/1.73M2 — SIGNIFICANT CHANGE UP
ELLIPTOCYTES BLD QL SMEAR: SLIGHT — SIGNIFICANT CHANGE UP
EOSINOPHIL # BLD AUTO: 0 K/UL — SIGNIFICANT CHANGE UP (ref 0–0.5)
EOSINOPHIL # BLD AUTO: 0.02 K/UL — SIGNIFICANT CHANGE UP (ref 0–0.5)
EOSINOPHIL NFR BLD AUTO: 0 % — SIGNIFICANT CHANGE UP (ref 0–6)
EOSINOPHIL NFR BLD AUTO: 0.2 % — SIGNIFICANT CHANGE UP (ref 0–6)
EPI CELLS # UR: SIGNIFICANT CHANGE UP
EPI CELLS # UR: SIGNIFICANT CHANGE UP
FLUAV AG NPH QL: SIGNIFICANT CHANGE UP
FLUBV AG NPH QL: SIGNIFICANT CHANGE UP
GAS PNL BLDV: 137 MMOL/L — SIGNIFICANT CHANGE UP (ref 136–145)
GAS PNL BLDV: SIGNIFICANT CHANGE UP
GLUCOSE BLDC GLUCOMTR-MCNC: 144 MG/DL — HIGH (ref 70–99)
GLUCOSE BLDV-MCNC: 144 MG/DL — HIGH (ref 70–99)
GLUCOSE SERPL-MCNC: 142 MG/DL — HIGH (ref 70–99)
GLUCOSE SERPL-MCNC: 231 MG/DL — HIGH (ref 70–99)
GLUCOSE UR QL: 100 MG/DL
GLUCOSE UR QL: 250
HCO3 BLDV-SCNC: 21 MMOL/L — LOW (ref 22–29)
HCT VFR BLD CALC: 39.7 % — SIGNIFICANT CHANGE UP (ref 34.5–45)
HCT VFR BLD CALC: 49.5 % — HIGH (ref 34.5–45)
HCT VFR BLDA CALC: 38 % — SIGNIFICANT CHANGE UP
HGB BLD CALC-MCNC: 12.5 G/DL — SIGNIFICANT CHANGE UP (ref 11.7–16.1)
HGB BLD-MCNC: 11.9 G/DL — SIGNIFICANT CHANGE UP (ref 11.5–15.5)
HGB BLD-MCNC: 15.1 G/DL — SIGNIFICANT CHANGE UP (ref 11.5–15.5)
HYPOCHROMIA BLD QL: SLIGHT — SIGNIFICANT CHANGE UP
IMM GRANULOCYTES NFR BLD AUTO: 1.6 % — HIGH (ref 0–0.9)
INR BLD: 1.34 RATIO — HIGH (ref 0.88–1.16)
INR BLD: 1.4 RATIO — HIGH (ref 0.88–1.16)
KETONES UR-MCNC: ABNORMAL
KETONES UR-MCNC: ABNORMAL
LACTATE BLDV-MCNC: 2.3 MMOL/L — HIGH (ref 0.5–2)
LACTATE SERPL-SCNC: 1.8 MMOL/L — SIGNIFICANT CHANGE UP (ref 0.7–2)
LACTATE SERPL-SCNC: 2.7 MMOL/L — HIGH (ref 0.5–2)
LACTATE SERPL-SCNC: 3 MMOL/L — HIGH (ref 0.7–2)
LACTATE SERPL-SCNC: 3.4 MMOL/L — HIGH (ref 0.5–2)
LACTATE SERPL-SCNC: 3.5 MMOL/L — HIGH (ref 0.5–2)
LEUKOCYTE ESTERASE UR-ACNC: NEGATIVE — SIGNIFICANT CHANGE UP
LEUKOCYTE ESTERASE UR-ACNC: NEGATIVE — SIGNIFICANT CHANGE UP
LYMPHOCYTES # BLD AUTO: 1.57 K/UL — SIGNIFICANT CHANGE UP (ref 1–3.3)
LYMPHOCYTES # BLD AUTO: 1.79 K/UL — SIGNIFICANT CHANGE UP (ref 1–3.3)
LYMPHOCYTES # BLD AUTO: 13.2 % — SIGNIFICANT CHANGE UP (ref 13–44)
LYMPHOCYTES # BLD AUTO: 14 % — SIGNIFICANT CHANGE UP (ref 13–44)
MANUAL SMEAR VERIFICATION: SIGNIFICANT CHANGE UP
MANUAL SMEAR VERIFICATION: SIGNIFICANT CHANGE UP
MCHC RBC-ENTMCNC: 21.8 PG — LOW (ref 27–34)
MCHC RBC-ENTMCNC: 22.1 PG — LOW (ref 27–34)
MCHC RBC-ENTMCNC: 30 GM/DL — LOW (ref 32–36)
MCHC RBC-ENTMCNC: 30.5 GM/DL — LOW (ref 32–36)
MCV RBC AUTO: 72.5 FL — LOW (ref 80–100)
MCV RBC AUTO: 72.8 FL — LOW (ref 80–100)
MICROCYTES BLD QL: SIGNIFICANT CHANGE UP
MICROCYTES BLD QL: SLIGHT — SIGNIFICANT CHANGE UP
MONOCYTES # BLD AUTO: 0.88 K/UL — SIGNIFICANT CHANGE UP (ref 0–0.9)
MONOCYTES # BLD AUTO: 1.28 K/UL — HIGH (ref 0–0.9)
MONOCYTES NFR BLD AUTO: 10 % — SIGNIFICANT CHANGE UP (ref 2–14)
MONOCYTES NFR BLD AUTO: 7.4 % — SIGNIFICANT CHANGE UP (ref 2–14)
NEUTROPHILS # BLD AUTO: 7.92 K/UL — HIGH (ref 1.8–7.4)
NEUTROPHILS # BLD AUTO: 9.15 K/UL — HIGH (ref 1.8–7.4)
NEUTROPHILS NFR BLD AUTO: 60 % — SIGNIFICANT CHANGE UP (ref 43–77)
NEUTROPHILS NFR BLD AUTO: 77.2 % — HIGH (ref 43–77)
NEUTS BAND # BLD: 2 % — SIGNIFICANT CHANGE UP (ref 0–8)
NITRITE UR-MCNC: NEGATIVE — SIGNIFICANT CHANGE UP
NITRITE UR-MCNC: NEGATIVE — SIGNIFICANT CHANGE UP
NRBC # BLD: 2 /100 — HIGH (ref 0–0)
NRBC # BLD: SIGNIFICANT CHANGE UP /100 WBCS (ref 0–0)
OVALOCYTES BLD QL SMEAR: SLIGHT — SIGNIFICANT CHANGE UP
OVALOCYTES BLD QL SMEAR: SLIGHT — SIGNIFICANT CHANGE UP
PCO2 BLDV: 42 MMHG — SIGNIFICANT CHANGE UP (ref 39–42)
PH BLDV: 7.3 — LOW (ref 7.32–7.43)
PH UR: 5 — SIGNIFICANT CHANGE UP (ref 5–8)
PH UR: 5 — SIGNIFICANT CHANGE UP (ref 5–8)
PLAT MORPH BLD: NORMAL — SIGNIFICANT CHANGE UP
PLAT MORPH BLD: NORMAL — SIGNIFICANT CHANGE UP
PLATELET # BLD AUTO: 183 K/UL — SIGNIFICANT CHANGE UP (ref 150–400)
PLATELET # BLD AUTO: 231 K/UL — SIGNIFICANT CHANGE UP (ref 150–400)
PO2 BLDV: 59 MMHG — HIGH (ref 25–45)
POIKILOCYTOSIS BLD QL AUTO: SIGNIFICANT CHANGE UP
POIKILOCYTOSIS BLD QL AUTO: SLIGHT — SIGNIFICANT CHANGE UP
POLYCHROMASIA BLD QL SMEAR: SLIGHT — SIGNIFICANT CHANGE UP
POLYCHROMASIA BLD QL SMEAR: SLIGHT — SIGNIFICANT CHANGE UP
POTASSIUM BLDV-SCNC: 3.3 MMOL/L — LOW (ref 3.5–5.1)
POTASSIUM SERPL-MCNC: 3.4 MMOL/L — LOW (ref 3.5–5.3)
POTASSIUM SERPL-MCNC: 4.5 MMOL/L — SIGNIFICANT CHANGE UP (ref 3.5–5.3)
POTASSIUM SERPL-SCNC: 3.4 MMOL/L — LOW (ref 3.5–5.3)
POTASSIUM SERPL-SCNC: 4.5 MMOL/L — SIGNIFICANT CHANGE UP (ref 3.5–5.3)
PROT SERPL-MCNC: 5.9 G/DL — LOW (ref 6.6–8.7)
PROT SERPL-MCNC: 7.9 GM/DL — SIGNIFICANT CHANGE UP (ref 6–8.3)
PROT UR-MCNC: 15
PROT UR-MCNC: 15
PROTHROM AB SERPL-ACNC: 15.6 SEC — HIGH (ref 10.5–13.4)
PROTHROM AB SERPL-ACNC: 16.3 SEC — HIGH (ref 10.5–13.4)
RBC # BLD: 5.45 M/UL — HIGH (ref 3.8–5.2)
RBC # BLD: 6.83 M/UL — HIGH (ref 3.8–5.2)
RBC # FLD: 19.3 % — HIGH (ref 10.3–14.5)
RBC # FLD: 20.2 % — HIGH (ref 10.3–14.5)
RBC BLD AUTO: ABNORMAL
RBC BLD AUTO: ABNORMAL
RBC CASTS # UR COMP ASSIST: SIGNIFICANT CHANGE UP /HPF (ref 0–4)
RBC CASTS # UR COMP ASSIST: SIGNIFICANT CHANGE UP /HPF (ref 0–4)
RSV RNA NPH QL NAA+NON-PROBE: DETECTED
SAO2 % BLDV: 85.5 % — SIGNIFICANT CHANGE UP
SARS-COV-2 RNA SPEC QL NAA+PROBE: SIGNIFICANT CHANGE UP
SCHISTOCYTES BLD QL AUTO: SLIGHT — SIGNIFICANT CHANGE UP
SCHISTOCYTES BLD QL AUTO: SLIGHT — SIGNIFICANT CHANGE UP
SODIUM SERPL-SCNC: 137 MMOL/L — SIGNIFICANT CHANGE UP (ref 135–145)
SODIUM SERPL-SCNC: 140 MMOL/L — SIGNIFICANT CHANGE UP (ref 135–145)
SP GR SPEC: 1.01 — SIGNIFICANT CHANGE UP (ref 1.01–1.02)
SP GR SPEC: 1.01 — SIGNIFICANT CHANGE UP (ref 1.01–1.02)
TARGETS BLD QL SMEAR: SLIGHT — SIGNIFICANT CHANGE UP
UROBILINOGEN FLD QL: NEGATIVE MG/DL — SIGNIFICANT CHANGE UP
UROBILINOGEN FLD QL: NEGATIVE — SIGNIFICANT CHANGE UP
VARIANT LYMPHS # BLD: 13 % — HIGH (ref 0–6)
WBC # BLD: 11.86 K/UL — HIGH (ref 3.8–10.5)
WBC # BLD: 12.78 K/UL — HIGH (ref 3.8–10.5)
WBC # FLD AUTO: 11.86 K/UL — HIGH (ref 3.8–10.5)
WBC # FLD AUTO: 12.78 K/UL — HIGH (ref 3.8–10.5)
WBC UR QL: SIGNIFICANT CHANGE UP /HPF (ref 0–5)
WBC UR QL: SIGNIFICANT CHANGE UP /HPF (ref 0–5)

## 2023-01-03 PROCEDURE — 71045 X-RAY EXAM CHEST 1 VIEW: CPT | Mod: 26,77

## 2023-01-03 PROCEDURE — 93010 ELECTROCARDIOGRAM REPORT: CPT

## 2023-01-03 PROCEDURE — 71045 X-RAY EXAM CHEST 1 VIEW: CPT | Mod: 26

## 2023-01-03 PROCEDURE — 99291 CRITICAL CARE FIRST HOUR: CPT | Mod: CS,GC

## 2023-01-03 PROCEDURE — 49440 PLACE GASTROSTOMY TUBE PERC: CPT | Mod: AS

## 2023-01-03 PROCEDURE — 93010 ELECTROCARDIOGRAM REPORT: CPT | Mod: 77

## 2023-01-03 PROCEDURE — 99223 1ST HOSP IP/OBS HIGH 75: CPT

## 2023-01-03 PROCEDURE — G1004: CPT

## 2023-01-03 PROCEDURE — 74177 CT ABD & PELVIS W/CONTRAST: CPT | Mod: 26,MG

## 2023-01-03 RX ORDER — METOCLOPRAMIDE HCL 10 MG
10 TABLET ORAL ONCE
Refills: 0 | Status: COMPLETED | OUTPATIENT
Start: 2023-01-03 | End: 2023-01-03

## 2023-01-03 RX ORDER — ACETAMINOPHEN 500 MG
1000 TABLET ORAL ONCE
Refills: 0 | Status: COMPLETED | OUTPATIENT
Start: 2023-01-03 | End: 2023-01-03

## 2023-01-03 RX ORDER — ONDANSETRON 8 MG/1
4 TABLET, FILM COATED ORAL ONCE
Refills: 0 | Status: COMPLETED | OUTPATIENT
Start: 2023-01-03 | End: 2023-01-03

## 2023-01-03 RX ORDER — FLUCONAZOLE 150 MG/1
400 TABLET ORAL EVERY 24 HOURS
Refills: 0 | Status: DISCONTINUED | OUTPATIENT
Start: 2023-01-03 | End: 2023-01-12

## 2023-01-03 RX ORDER — PIPERACILLIN AND TAZOBACTAM 4; .5 G/20ML; G/20ML
3.38 INJECTION, POWDER, LYOPHILIZED, FOR SOLUTION INTRAVENOUS ONCE
Refills: 0 | Status: COMPLETED | OUTPATIENT
Start: 2023-01-03 | End: 2023-01-03

## 2023-01-03 RX ORDER — VANCOMYCIN HCL 1 G
500 VIAL (EA) INTRAVENOUS EVERY 12 HOURS
Refills: 0 | Status: DISCONTINUED | OUTPATIENT
Start: 2023-01-03 | End: 2023-01-05

## 2023-01-03 RX ORDER — GLUCAGON INJECTION, SOLUTION 0.5 MG/.1ML
1 INJECTION, SOLUTION SUBCUTANEOUS ONCE
Refills: 0 | Status: DISCONTINUED | OUTPATIENT
Start: 2023-01-03 | End: 2023-01-12

## 2023-01-03 RX ORDER — ALBUTEROL 90 UG/1
2 AEROSOL, METERED ORAL EVERY 6 HOURS
Refills: 0 | Status: DISCONTINUED | OUTPATIENT
Start: 2023-01-03 | End: 2023-01-06

## 2023-01-03 RX ORDER — VANCOMYCIN HCL 1 G
VIAL (EA) INTRAVENOUS
Refills: 0 | Status: DISCONTINUED | OUTPATIENT
Start: 2023-01-03 | End: 2023-01-03

## 2023-01-03 RX ORDER — DEXTROSE 50 % IN WATER 50 %
12.5 SYRINGE (ML) INTRAVENOUS ONCE
Refills: 0 | Status: DISCONTINUED | OUTPATIENT
Start: 2023-01-03 | End: 2023-01-12

## 2023-01-03 RX ORDER — SODIUM CHLORIDE 9 MG/ML
1000 INJECTION, SOLUTION INTRAVENOUS
Refills: 0 | Status: DISCONTINUED | OUTPATIENT
Start: 2023-01-03 | End: 2023-01-12

## 2023-01-03 RX ORDER — MAGNESIUM OXIDE 400 MG ORAL TABLET 241.3 MG
400 TABLET ORAL EVERY 8 HOURS
Refills: 0 | Status: DISCONTINUED | OUTPATIENT
Start: 2023-01-03 | End: 2023-01-12

## 2023-01-03 RX ORDER — SODIUM CHLORIDE 9 MG/ML
1900 INJECTION INTRAMUSCULAR; INTRAVENOUS; SUBCUTANEOUS ONCE
Refills: 0 | Status: COMPLETED | OUTPATIENT
Start: 2023-01-03 | End: 2023-01-03

## 2023-01-03 RX ORDER — DEXTROSE 50 % IN WATER 50 %
25 SYRINGE (ML) INTRAVENOUS ONCE
Refills: 0 | Status: DISCONTINUED | OUTPATIENT
Start: 2023-01-03 | End: 2023-01-12

## 2023-01-03 RX ORDER — SODIUM CHLORIDE 9 MG/ML
1000 INJECTION INTRAMUSCULAR; INTRAVENOUS; SUBCUTANEOUS
Refills: 0 | Status: DISCONTINUED | OUTPATIENT
Start: 2023-01-03 | End: 2023-01-07

## 2023-01-03 RX ORDER — APIXABAN 2.5 MG/1
5 TABLET, FILM COATED ORAL EVERY 12 HOURS
Refills: 0 | Status: DISCONTINUED | OUTPATIENT
Start: 2023-01-03 | End: 2023-01-12

## 2023-01-03 RX ORDER — LACTULOSE 10 G/15ML
10 SOLUTION ORAL
Refills: 0 | Status: DISCONTINUED | OUTPATIENT
Start: 2023-01-03 | End: 2023-01-12

## 2023-01-03 RX ORDER — HYDROCORTISONE 20 MG
50 TABLET ORAL EVERY 8 HOURS
Refills: 0 | Status: DISCONTINUED | OUTPATIENT
Start: 2023-01-03 | End: 2023-01-05

## 2023-01-03 RX ORDER — POLYETHYLENE GLYCOL 3350 17 G/17G
17 POWDER, FOR SOLUTION ORAL
Refills: 0 | Status: DISCONTINUED | OUTPATIENT
Start: 2023-01-03 | End: 2023-01-12

## 2023-01-03 RX ORDER — TIOTROPIUM BROMIDE 18 UG/1
2 CAPSULE ORAL; RESPIRATORY (INHALATION) DAILY
Refills: 0 | Status: DISCONTINUED | OUTPATIENT
Start: 2023-01-03 | End: 2023-01-12

## 2023-01-03 RX ORDER — INSULIN LISPRO 100/ML
VIAL (ML) SUBCUTANEOUS
Refills: 0 | Status: DISCONTINUED | OUTPATIENT
Start: 2023-01-03 | End: 2023-01-04

## 2023-01-03 RX ORDER — MEROPENEM 1 G/30ML
1000 INJECTION INTRAVENOUS EVERY 8 HOURS
Refills: 0 | Status: COMPLETED | OUTPATIENT
Start: 2023-01-03 | End: 2023-01-11

## 2023-01-03 RX ORDER — COLLAGENASE CLOSTRIDIUM HIST. 250 UNIT/G
1 OINTMENT (GRAM) TOPICAL
Refills: 0 | Status: DISCONTINUED | OUTPATIENT
Start: 2023-01-03 | End: 2023-01-12

## 2023-01-03 RX ORDER — SODIUM CHLORIDE 9 MG/ML
1000 INJECTION INTRAMUSCULAR; INTRAVENOUS; SUBCUTANEOUS ONCE
Refills: 0 | Status: COMPLETED | OUTPATIENT
Start: 2023-01-03 | End: 2023-01-03

## 2023-01-03 RX ORDER — PIPERACILLIN AND TAZOBACTAM 4; .5 G/20ML; G/20ML
3.38 INJECTION, POWDER, LYOPHILIZED, FOR SOLUTION INTRAVENOUS ONCE
Refills: 0 | Status: DISCONTINUED | OUTPATIENT
Start: 2023-01-03 | End: 2023-01-03

## 2023-01-03 RX ORDER — MEXILETINE HYDROCHLORIDE 150 MG/1
200 CAPSULE ORAL EVERY 8 HOURS
Refills: 0 | Status: DISCONTINUED | OUTPATIENT
Start: 2023-01-03 | End: 2023-01-12

## 2023-01-03 RX ORDER — DEXTROSE 50 % IN WATER 50 %
15 SYRINGE (ML) INTRAVENOUS ONCE
Refills: 0 | Status: DISCONTINUED | OUTPATIENT
Start: 2023-01-03 | End: 2023-01-12

## 2023-01-03 RX ADMIN — MEROPENEM 1000 MILLIGRAM(S): 1 INJECTION INTRAVENOUS at 22:29

## 2023-01-03 RX ADMIN — Medication 50 MILLIGRAM(S): at 16:49

## 2023-01-03 RX ADMIN — APIXABAN 5 MILLIGRAM(S): 2.5 TABLET, FILM COATED ORAL at 22:27

## 2023-01-03 RX ADMIN — SODIUM CHLORIDE 1000 MILLILITER(S): 9 INJECTION INTRAMUSCULAR; INTRAVENOUS; SUBCUTANEOUS at 07:15

## 2023-01-03 RX ADMIN — PIPERACILLIN AND TAZOBACTAM 200 GRAM(S): 4; .5 INJECTION, POWDER, LYOPHILIZED, FOR SOLUTION INTRAVENOUS at 09:59

## 2023-01-03 RX ADMIN — PIPERACILLIN AND TAZOBACTAM 200 GRAM(S): 4; .5 INJECTION, POWDER, LYOPHILIZED, FOR SOLUTION INTRAVENOUS at 02:01

## 2023-01-03 RX ADMIN — PIPERACILLIN AND TAZOBACTAM 25 GRAM(S): 4; .5 INJECTION, POWDER, LYOPHILIZED, FOR SOLUTION INTRAVENOUS at 16:33

## 2023-01-03 RX ADMIN — SODIUM CHLORIDE 1900 MILLILITER(S): 9 INJECTION INTRAMUSCULAR; INTRAVENOUS; SUBCUTANEOUS at 00:53

## 2023-01-03 RX ADMIN — MAGNESIUM OXIDE 400 MG ORAL TABLET 400 MILLIGRAM(S): 241.3 TABLET ORAL at 22:27

## 2023-01-03 RX ADMIN — ONDANSETRON 4 MILLIGRAM(S): 8 TABLET, FILM COATED ORAL at 03:21

## 2023-01-03 RX ADMIN — Medication 10 MILLIGRAM(S): at 04:26

## 2023-01-03 RX ADMIN — PIPERACILLIN AND TAZOBACTAM 3.38 GRAM(S): 4; .5 INJECTION, POWDER, LYOPHILIZED, FOR SOLUTION INTRAVENOUS at 02:31

## 2023-01-03 RX ADMIN — Medication 1 APPLICATION(S): at 22:28

## 2023-01-03 RX ADMIN — Medication 400 MILLIGRAM(S): at 06:13

## 2023-01-03 RX ADMIN — Medication 10 MILLIGRAM(S): at 06:32

## 2023-01-03 RX ADMIN — ONDANSETRON 4 MILLIGRAM(S): 8 TABLET, FILM COATED ORAL at 13:27

## 2023-01-03 RX ADMIN — Medication 100 MILLIGRAM(S): at 11:46

## 2023-01-03 RX ADMIN — SODIUM CHLORIDE 70 MILLILITER(S): 9 INJECTION INTRAMUSCULAR; INTRAVENOUS; SUBCUTANEOUS at 19:02

## 2023-01-03 RX ADMIN — MEXILETINE HYDROCHLORIDE 200 MILLIGRAM(S): 150 CAPSULE ORAL at 22:27

## 2023-01-03 NOTE — ED ADULT NURSE REASSESSMENT NOTE - NS ED NURSE REASSESS COMMENT FT1
received pt from Skye Love at 0700, pt alert, disoriented, hypotensive, tachycardic, hypoxic.  Started 3rd liter of NS fluids, increase the oxygen to 6L, initiated 2nd IV, 22G over the left arm.  Called Zoe Flores, HCP, MD lozano notified and explained her about her condition and POC. HCP verbalized pt is full code, MOLST form and transfer sheet filled out.  report given to RN at [facility].  pt transfer via ambulance.

## 2023-01-03 NOTE — H&P ADULT - ASSESSMENT
Sepsis  and Acute resp failure Pneumonia/ Pyelonephritis   c/w supplemental O2  Stress steroids solcortef 50 every 8  Meropenem IV Tid  Albuterol, Mucinex, Chest PT    h/o Fungemia: on Fluconazole po daily per ID    c/w Bactrim prophylaxis      Adrenal Insufficiency   stress steroids while septic        DM type II    PAfib:  c/w Apixaban   Mexiletine   BB when BP allows      Sepsis  and Acute resp failure Pneumonia/ Pyelonephritis   c/w supplemental O2  Stress steroids solcortef 50 every 8  Meropenem IV Tid  Albuterol, Mucinex, Chest PT  h/o Fungemia: on Fluconazole cont  c/w Bactrim prophylaxis      Adrenal Insufficiency   stress steroids while septic       DM type II    PAfib:  c/w Apixaban   Mexiletine   BB when BP allows     DVT - Apixaban   82 year old female with ?dementia - currently not responding - daughter at bedside - states she has never been told patient has dementia but she has been in and out of hospitals and rehabs for over 9 months now.- she got called form [facility] yesterday     Sepsis  and Acute resp failure Pneumonia/ Pyelonephritis   c/w supplemental O2  Stress steroids solcortef 50 every 8  Meropenem IV Tid  Albuterol, Mucinex, Chest PT  h/o Fungemia: on Fluconazole cont  Bactrim prophylaxis     Adrenal Insufficiency   stress steroids while septic     DM type II    PAfib:  c/w Apixaban   Mexiletine   BB when BP allows     DVT - Apixaban

## 2023-01-03 NOTE — CONSULT NOTE ADULT - SUBJECTIVE AND OBJECTIVE BOX
INFECTIOUS DISEASES AND INTERNAL MEDICINE at [place]  =======================================================  Choco Dowell MD  Diplomates American Board of Internal Medicine and Infectious Diseases  Telephone [phone]  Fax            [phone]  =======================================================    RAYRAY [id number]      HPI:  82 year old female   TRANSFER  from [facility] for Aortic aneurysm evaluation-y  BIBA from [facility] for worsening SOB since last night [facility] -tx to [facility] ,seen by Ct Sx ->and no surgical intervention advised  significant medical hx includes but is not limited to adrenal insufficiency, aortic insufficiency, asthma, Afib on Eliquis, colorectal cancer s/p resection with colostomy bag, COPD, DM, DVT, pelvic fracture, rectal bleeding, seizure, TIA, tracheobronchomalacia, type I aortic dissection s/p Bentall procedure and hemiarch replacement    patient is unresponsive and hypoxic and hypotensive - intermittent opens eyes   currently lethargic- daughter at bedside -  -    PT HAS HX OF MRSA AND CANDIDEMIA AND HAS BEEN ON CHRONIC SUPPRESSIVE BACTRIM AND DIFLUCAN SINCE  HOSPITALIZATION AT [facility]  ASKED TO EVALUATE FROM ID STANDPOINT         PAST MEDICAL & SURGICAL HISTORY:  Atrial fibrillation  paroxysmal, on eliquis      Diabetes  Type 2      COPD (chronic obstructive pulmonary disease)      Adrenal insufficiency  Medrol daily for over 50 years      Aortic insufficiency  moderate AR on echo 5/3/2018      Pelvic fracture      Asthma      Tracheobronchomalacia  diagnosed , s/p bronchial thermoplasty  (Dr Zapien); recent bronchoscopy 2018 revealed no evidence of tracheobronchomalacia in trachea or bronchial tubes      Colorectal cancer  2018- last treatment , chemo and radiation      Rectal bleeding      Seizure  x 1 18      DVT (deep venous thrombosis)  15-20 years ago, took coumadin      TIA (transient ischemic attack)  multiple, last 5 years ago - presents as right-sided weakness      History of partial hysterectomy  30 years ago - fibroids      H/O total knee replacement, bilateral  5 years ago      History of sinus surgery  multiple sinus surgeries      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      H/O pelvic surgery  5 years ago - s/p fracture      History of tracheomalacia   - attempted tracheal stenting ([facility])- course complicated by obstruction, respiratory failure, multiple CPR attempts -  stent discontinued; 10/20/2016 Tracheobronchoplasty (Prolene Mesh) performed at [facility] by Dr Zapien      S/P bronchoscopy  2018 - [place] (Dr Zapien) no evidence of tracheobronchomalacia in trachea or bronchial tubes      Rectal bleeding  exam under anesthesia ([facility]) 2018          ANTIBIOTICS  fluconAZOLE IVPB 400 milliGRAM(s) IV Intermittent every 24 hours  meropenem Injectable 1000 milliGRAM(s) IV Push every 8 hours  vancomycin  IVPB 500 milliGRAM(s) IV Intermittent every 12 hours      Allergies    aspirin (Short breath)  Avelox (Short breath; Pruritus)  cefepime (Anaphylaxis)  codeine (Short breath)  Dilaudid (Short breath)  iodine (Short breath; Swelling)  penicillin (Anaphylaxis)  shellfish (Anaphylaxis)  tetanus toxoid (Short breath)  Valium (Short breath)    Intolerances        SOCIAL HISTORY:     FAMILY HX   FAMILY HISTORY:  Family history of asthma    Family history of breast cancer (Sibling)    Family history of diabetes mellitus type II        Vital Signs Last 24 Hrs  T(C): 36.7 (2023 15:44), Max: 38.3 (2023 00:24)  T(F): 98 (2023 15:44), Max: 100.9 (2023 00:24)  HR: 116 (2023 15:44) (76 - 131)  BP: 118/74 (2023 15:44) (79/57 - 161/117)  BP(mean): 67 (2023 07:00) (66 - 109)  RR: 20 (2023 15:44) (16 - 28)  SpO2: 94% (2023 15:44) (93% - 97%)    Parameters below as of 2023 15:44  Patient On (Oxygen Delivery Method): room air      Drug Dosing Weight  Height (cm): 170.2 (2023 08:55)  Weight (kg): 60.7 (2023 08:55)  BMI (kg/m2): 21 (2023 08:55)  BSA (m2): 1.7 (2023 08:55)      REVIEW OF SYSTEMS:    UNABLE TO OBTAIN              PHYSICAL EXAMINATION:    GENERAL: The patient is a _LETHARGIC   CACHETIC     VITAL SIGNS: T(C): 36.7 (23 @ 15:44), Max: 38.3 (23 @ 00:24)  HR: 116 (23 @ 15:44) (76 - 131)  BP: 118/74 (23 @ 15:44) (79/57 - 161/117)  RR: 20 (23 @ 15:44) (16 - 28)  SpO2: 94% (23 @ 15:44) (93% - 97%)  Wt(kg): --    HEENT: Head is normocephalic and atraumatic.    ANICTERIC  NECK: Supple. No carotid bruits.  No lymphadenopathy or thyromegaly.    LUNGS: COARSE BREATH SOUNDS    HEART: Regular rate and rhythm without murmur.    ABDOMEN: Soft, nontender, and nondistended.  Positive bowel sounds.  No hepatosplenomegaly was noted. NO REBOUND NO GUARDING    EXTREMITIES: NO EDEMA NO ERYTHEMA    NEUROLOGIC: LETHARGIC  RESPONSIVE TO VOICE       SKIN: PRESSURE  ULCER ON HEEL  AND ELBOW AS  PER PHOTOS         BLOOD CULTURES       URINE CX          LABS:                        11.9   11.86 )-----------( 183      ( 2023 09:16 )             39.7         140  |  106  |  10.7  ----------------------------<  142<H>  3.4<L>   |  20.0<L>  |  0.77    Ca    7.9<L>      2023 09:16    TPro  5.9<L>  /  Alb  3.0<L>  /  TBili  0.3<L>  /  DBili  x   /  AST  27  /  ALT  14  /  AlkPhos  88      PT/INR - ( 2023 09:16 )   PT: 16.3 sec;   INR: 1.40 ratio         PTT - ( 2023 09:16 )  PTT:32.7 sec  Urinalysis Basic - ( 2023 09:50 )    Color: Yellow / Appearance: Clear / S.010 / pH: x  Gluc: x / Ketone: Small  / Bili: Negative / Urobili: Negative mg/dL   Blood: x / Protein: 15 / Nitrite: Negative   Leuk Esterase: Negative / RBC: 0-2 /HPF / WBC 0-2 /HPF   Sq Epi: x / Non Sq Epi: Occasional / Bacteria: Few        RADIOLOGY & ADDITIONAL STUDIES:      ASSESSMENT/PLAN  82 year old female   TRANSFER  from [facility] for Aortic aneurysm evaluation-y  BIBA from [facility] for worsening SOB since last night [facility] -tx to [facility] ,seen by Ct Sx ->and no surgical intervention advised  significant medical hx includes but is not limited to adrenal insufficiency, aortic insufficiency, asthma, Afib on Eliquis, colorectal cancer s/p resection with colostomy bag, COPD, DM, DVT, pelvic fracture, rectal bleeding, seizure, TIA, tracheobronchomalacia, type I aortic dissection s/p Bentall procedure and hemiarch replacement    patient is unresponsive and hypoxic and hypotensive - intermittent opens eyes   currently lethargic- daughter at bedside -  -    PT HAS HX OF MRSA AND CANDIDEMIA AND HAS BEEN ON CHRONIC SUPPRESSIVE BACTRIM AND DIFLUCAN SINCE  HOSPITALIZATION AT [facility]  BLOOD CX X2 SENT WILL FOLLOWUP   PLACED ON BROAD SPECTRUM ABX AND ANTIFUNGAL  WILL CONTINUE FOR NOW  PT WITH RVP POS RSV  NO TREATMENT FOR THIS  PT TO BE PLACED IN MONITORED SETTING  CT SURGERY EVAL NOTED   SPOKE TO DAUGHTER AT BEDSIDE SHOWED  PHOTOS OF DECUBITUS ULCERS ON HEELS AND ELBOW  APPEARED TO BE CLEAN   SHE IS ALSO CONCERNED ABOUT ASPIRATION HX OF TRACHEOMALACIA    SUGGEST SPEECH AND SWALLOW EVAL  WILL  FOLLOWUP  WITH FURTHER RECOMMENDATIONS                SHERMAN RYAN MD

## 2023-01-03 NOTE — ED PROVIDER NOTE - OBJECTIVE STATEMENT
72F hx late dementia, adrenal insufficiency, aortic insufficiency, asthma, Afib on Eliquis, colorectal cancer s/p resection with colostomy bag, COPD, DM, DVT, pelvic fracture, rectal bleeding, seizure, TIA, tracheobronchomalacia, type I aortic dissection s/p Bentall procedure and hemiarch replacement 9/6 with Dr. Cabrera, recent admission fo r sepsis 2/2 pneumonia transferred from [facility] with hypoxia and hypotension with +RSV, possible overlying pneumonia and pyelo on CT, concerning for sepsis. Vascular aware of transfer and at bedside.  Patient given IVF and zosyn at prior hospital. Patient poor historian. Patient full code.

## 2023-01-03 NOTE — PROCEDURE NOTE - NSINFORMCONSENT_GEN_A_CORE
Daughter (HCP) at bedside/Benefits, risks, and possible complications of procedure explained to patient/caregiver who verbalized understanding and gave verbal consent.

## 2023-01-03 NOTE — ED PROVIDER NOTE - NSICDXPASTSURGICALHX_GEN_ALL_CORE_FT
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    H/O pelvic surgery 5 years ago - s/p fracture    H/O total knee replacement, bilateral 5 years ago    History of partial hysterectomy 30 years ago - fibroids    History of sinus surgery multiple sinus surgeries    History of tracheomalacia 2015 - attempted tracheal stenting ([facility])- course complicated by obstruction, respiratory failure, multiple CPR attempts -  stent discontinued; 10/20/2016 Tracheobronchoplasty (Prolene Mesh) performed at [facility] by Dr Zapien    Rectal bleeding exam under anesthesia ([facility]) 2/2018    S/P bronchoscopy 6/5/2018 - [place] (Dr Zapien) no evidence of tracheobronchomalacia in trachea or bronchial tubes

## 2023-01-03 NOTE — ED PROVIDER NOTE - CLINICAL SUMMARY MEDICAL DECISION MAKING FREE TEXT BOX
71 y/o female with h/o multiple medical issues from the [facility] in ED with fever, tachycardia and vomiting today.   pt with h/o dementia unable to give further history.   per EMS, temp at .6,   active vomiting in ED.     pt meeting sepsis criteria.   will w/u and admit likely PNA

## 2023-01-03 NOTE — ED ADULT NURSE NOTE - CHIEF COMPLAINT QUOTE
Patient BIBEMS from [facility], for fever, tachycardia and vomiting. Temp at home was 101.6, Tylenol given PTA. Hx dementia. Patient vomited twice in triage.

## 2023-01-03 NOTE — ED ADULT NURSE REASSESSMENT NOTE - NS ED NURSE REASSESS COMMENT FT1
assuming care for this pt from RADHA aleman. pt awake and oriented x 2. pt has 2 iv lines 20 right forearm and 18 left upper arm. pt hooked to monitor sinus tachcyardia noted. ramirez catheter in placed. still for NGT insterion. called med pa for a possible ngt insertion. will continue to monitor

## 2023-01-03 NOTE — ED PROVIDER NOTE - PROGRESS NOTE DETAILS
Given the significant and immediate threats to this patient based on initial presentation, the benefits of emergency contrast-enhanced CT imaging without obtaining GFR/creatinine serum level results greatly outweigh the potential risk of harm due to contrast-induced nephropathy or possible allergy, patient has tolerated contrast earlier today without issue. Sonali: I spoke with Dr. Childs of radiology who feels CT angio is not indicated at this point. I recommended he communicate with Dr. Mason and Dr. Allison direction as CT Sx and Vasc Sx evaluated the patient and requested these images. I will cancel imaging now until further discussion is had regarding necessity.

## 2023-01-03 NOTE — ED ADULT NURSE NOTE - NSIMPLEMENTINTERV_GEN_ALL_ED
no
Implemented All Fall with Harm Risk Interventions:  [place] to call system. Call bell, personal items and telephone within reach. Instruct patient to call for assistance. Room bathroom lighting operational. Non-slip footwear when patient is off stretcher. Physically safe environment: no spills, clutter or unnecessary equipment. Stretcher in lowest position, wheels locked, appropriate side rails in place. Provide visual cue, wrist band, yellow gown, etc. Monitor gait and stability. Monitor for mental status changes and reorient to person, place, and time. Review medications for side effects contributing to fall risk. Reinforce activity limits and safety measures with patient and family. Provide visual clues: red socks.

## 2023-01-03 NOTE — CONSULT NOTE ADULT - PROBLEM SELECTOR RECOMMENDATION 9
Type A dissection recently repaired in 9/2022 without evolution of the thoracic/abdominal dissection based upon prior and current CT scans.  Pending repeat CTA  Current symptoms best explained by sepsis  No surgical intervention indicated at this time  Will continue to follow  Case discussed with Dr. Mason

## 2023-01-03 NOTE — ED PROVIDER NOTE - CARE PLAN
Principal Discharge DX:	Septic shock  Secondary Diagnosis:	Multifocal pneumonia  Secondary Diagnosis:	Respiratory syncytial virus (RSV)  Secondary Diagnosis:	Acute respiratory failure with hypoxia   1

## 2023-01-03 NOTE — ED ADULT NURSE NOTE - OBJECTIVE STATEMENT
pt transferred from [facility] for evaluation of aortic dissection. pt given 3L NS and zosyn prior to arrival. pt has #20 RAC and #22 LAC from [place] upon arrival. pt a&ox1

## 2023-01-03 NOTE — H&P ADULT - HISTORY OF PRESENT ILLNESS
82 year old female with advanced dementia - currently not responding - daughter at bedside - states she has never been told patient has dementia but she has been in and out of hospitals and rehabs for over 9 months now.- she got called form [facility] yesterday saying she wasn't well and hypoxic-. BIBA from [facility] for worsening SOB since last night [place] -tx to [facility] ,seen by Ct Sx and no surgical intervention advised- meanwhile in ED   significant medical hx includes but is not limited to adrenal insufficiency, aortic insufficiency, asthma, Afib on Eliquis, colorectal cancer s/p resection with colostomy bag, COPD, DM, DVT, pelvic fracture, rectal bleeding, seizure, TIA, tracheobronchomalacia, type I aortic dissection s/p Bentall procedure and hemiarch replacement 9/6   patient is unresponsive and hypoxic and hypotensive    82 year old female with advanced dementia - currently not responding - daughter at bedside - states she has never been told patient has dementia but she has been in and out of hospitals and rehabs for over 9 months now.- she got called [facility] yesterday saying she wasn't well and hypoxic-. BIBA from [facility] for worsening SOB since last night [place] -tx to [facility] ,seen by Ct Sx and no surgical intervention advised- meanwhile in ED   significant medical hx includes but is not limited to adrenal insufficiency, aortic insufficiency, asthma, Afib on Eliquis, colorectal cancer s/p resection with colostomy bag, COPD, DM, DVT, pelvic fracture, rectal bleeding, seizure, TIA, tracheobronchomalacia, type I aortic dissection s/p Bentall procedure and hemiarch replacement 9/6   patient is unresponsive and hypoxic and hypotensive - intermittent opens eyes    82 year old female with ?dementia - sent from [place] for Aortic aneurysm evaluation-y  BIBA from [facility] for worsening SOB since last night [place] -tx to [facility] ,seen by Ct Sx ->and no surgical intervention advised  significant medical hx includes but is not limited to adrenal insufficiency, aortic insufficiency, asthma, Afib on Eliquis, colorectal cancer s/p resection with colostomy bag, COPD, DM, DVT, pelvic fracture, rectal bleeding, seizure, TIA, tracheobronchomalacia, type I aortic dissection s/p Bentall procedure and hemiarch replacement 9/6   patient is unresponsive and hypoxic and hypotensive - intermittent opens eyes   currently lethargic- daughter at bedside - states she has never been told patient has dementia but she has been in and out of hospitals and rehabs for over 9 months now.- with decubitus ulcers on legs elbow and back- daughter has pictures- states mom has been not really ambulating and not been improving for several months, but does get better neurologically and can hold a conversation at baseline 82 year old female with ?dementia - sent from [place] for Aortic aneurysm evaluation-y  BIBA from [facility] for worsening SOB since last night [facility] -tx to [facility] ,seen by Ct Sx ->and no surgical intervention advised  significant medical hx includes but is not limited to adrenal insufficiency, aortic insufficiency, asthma, Afib on Eliquis, colorectal cancer s/p resection with colostomy bag, COPD, DM, DVT, pelvic fracture, rectal bleeding, seizure, TIA, tracheobronchomalacia, type I aortic dissection s/p Bentall procedure and hemiarch replacement 9/6 , candemia and MRSA  patient is unresponsive and hypoxic and hypotensive - intermittent opens eyes   currently lethargic- daughter at bedside - states she has never been told patient has dementia but she has been in and out of hospitals and rehabs for over 9 months now.- with decubitus ulcers on legs elbow and back- daughter has pictures- states mom has been not really ambulating and not been improving for several months, but does get better neurologically and can hold a conversation at baseline

## 2023-01-03 NOTE — ED ADULT NURSE REASSESSMENT NOTE - NS ED NURSE REASSESS COMMENT FT1
Assumed care at 10:50, received report from Yan[facility] pt brought over from critical transport from [place] to rule out new abdominal dissection. Pt's a&ox1, pending Vancomycin administration,

## 2023-01-03 NOTE — ED ADULT NURSE NOTE - OBJECTIVE STATEMENT
BIBA, per triage note pt presents from care facility for tachycardia, fever. Hx dementia, arf, afib, DM II, asthma, metabolic encephalopathy, pneumonia, HLD, HTN,  GERD, colostomy bag. Pt febrile, tachycardic, denies pain, actively vomiting.

## 2023-01-03 NOTE — CONSULT NOTE ADULT - SUBJECTIVE AND OBJECTIVE BOX
Consult for surgeon:  Isabella    HPI:  72y Female with PMHx type I aortic dissection s/p Bentall procedure and hemiarch replacement 2022 with Dr. Cabrera, of adrenal insufficiency, aortic insufficiency, asthma, Afib on Eliquis, colorectal cancer s/p resection with colostomy bag, COPD, DM, DVT, pelvic fracture, rectal bleeding, seizure, TIA, tracheobronchomalacia, BIBA to [facility] from [facility] for fever, tachycardia and vomiting, Further w/u at  significant sepsis 2/2 multifocal pneumonia and pyelonephritis. Pt was treated with ABX and fluid resuscitation. CTA abd/pelivs showed and aortic dissection which extends from the visualized portions of the descending thoracic aorta into the abdomen with true lumen supplying the mesenteric branch vessels, renal arteries and common iliac arteries. CT surgery was consulted by  for aortic dissection. Pt arrived to [facility] ED, was seen and examined by CTS team. Pt with SBP in the 90s after fluid boluses. Mentation is appropriate. Pt with hx of dementia, but answers all questions appropriately. Patient lying in bed in NAD. Denies fevers, chills, lightheadedness, dizziness, HA, CP, palpitations, SOB, cough, abdominal pain, N/V/D. Pt only chief complain at this is buttock pain.  Review of systems:  ROS negative x 10 systems except as noted above.    PAST MEDICAL & SURGICAL HISTORY:  Atrial fibrillation, paroxysmal, on Eliquis  Diabetes Type 2  COPD (chronic obstructive pulmonary disease)  Adrenal insufficiency - Medrol daily for over 50 years  Aortic insufficiency - moderate AR on echo 5/3/2018  Pelvic fracture  Asthma  Tracheobronchomalacia  diagnosed , s/p bronchial thermoplasty  (Dr Zapien); recent bronchoscopy 2018 revealed no evidence of          tracheobronchomalacia in trachea or bronchial tubes  Colorectal cancer  2018- last treatment , chemo and radiation  Rectal bleeding  Seizure x 1 18  DVT (deep venous thrombosis)  15-20 years ago, took coumadin  TIA (transient ischemic attack)  multiple, last 5 years ago - presents as right-sided weakness    History of partial hysterectomy  30 years ago - fibroids  H/O total knee replacement, bilateral  5 years ago  History of sinus surgery  multiple sinus surgeries  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  H/O pelvic surgery  5 years ago - s/p fracture  History of tracheomalacia   - attempted tracheal stenting ([facility])- course complicated by obstruction, respiratory failure, multiple CPR attempts -  stent discontinued; 10/20/2016 Tracheobronchoplasty (Prolene Mesh) performed at [facility] by Dr Zapien  S/P bronchoscopy  2018 - [place] (Dr Zapien) no evidence of tracheobronchomalacia in trachea or bronchial tubes  Rectal bleeding  exam under anesthesia ([facility]) 2018      MEDICATIONS (HOME MEDS):  · 	Medrol 8 mg oral tablet: Last Dose Taken:  , 1 tab(s) orally once a day  · 	silver sulfADIAZINE 1% topical cream: Last Dose Taken:  , Apply topically every day and evening shift for wound care, cleanse with NS, apply tx and DCD with kerlex  · 	ipratropium-albuterol 0.5 mg-2.5 mg/3 mL inhalation solution: Last Dose Taken:  , 3 milliliter(s) inhaled every 6 hours  · 	mupirocin 2% topical ointment: Last Dose Taken:  , Apply topically to left heel and right ankle every day shift, cleanse with NS, apply tx, dcd and kerlex  · 	acetylcysteine 10% inhalation solution: Last Dose Taken:  , 4 milliliter(s) inhaled every 12 hours  · 	ascorbic acid 500 mg oral tablet: Last Dose Taken:  , 1 tab(s) orally once a day  · 	Multiple Vitamins oral tablet: Last Dose Taken:  , 1 tab(s) orally once a day  · 	hydrALAZINE 50 mg oral tablet: Last Dose Taken:  , 1 tab(s) orally 3 times a day  · 	sulfamethoxazole-trimethoprim 400 mg-80 mg oral tablet: Last Dose Taken:  , 1 tab(s) orally once a day  · 	magnesium oxide 400 mg oral tablet: Last Dose Taken:  , 1 tab(s) orally every 8 hours  · 	nystatin 100,000 units/g topical powder: Last Dose Taken:  , Apply topically to groin/under B/L breast every day and evening shift for fungal rash, cleanse area with NS, pat dry and apply powder  · 	fluconazole 200 mg oral tablet: Last Dose Taken:  , 2 tab(s) orally once a day  · 	insulin glargine 100 units/mL subcutaneous solution: Last Dose Taken:  , 18 unit(s) subcutaneous once a day (at bedtime)  · 	apixaban 5 mg oral tablet: Last Dose Taken:  , 1 tab(s) orally every 12 hours  · 	mexiletine 200 mg oral capsule: Last Dose Taken:  , 1 cap(s) orally every 8 hours  · 	Crestor 5 mg oral tablet: Last Dose Taken:  , 1 tab(s) orally once a day (at bedtime)  · 	omeprazole 20 mg oral delayed release tablet: Last Dose Taken:  , 1 tab(s) orally once a day  · 	acetaminophen 500 mg oral tablet: Last Dose Taken:  , 1 tab(s) orally every 4 hours, As Needed s needed for pain, fever > 101  · 	Admelog SoloStar 100 units/mL injectable solution: Last Dose Taken:  , 5 unit(s) subcutaneously with meals  · 	Admelog SoloStar 100 units/mL injectable solution: Last Dose Taken:  , Inject as per sliding scale:  	< 60 = 0 units  	201-250 = 2 units  	251-300 = 4 units  	301-350 = 6 units  	351-400 = 8 units  	> 400 = 0 units, call MD  · 	budesonide 0.5 mg/2 mL inhalation suspension: Last Dose Taken:  , 2 milliliter(s) inhaled 2 times a day  · 	metoprolol tartrate 25 mg oral tablet: Last Dose Taken:  , 0.5 tab(s) orally 2 times a day  · 	MiraLax oral powder for reconstitution: Last Dose Taken:  , 17 gram(s) orally 2 times a day    MEDICATIONS  (STANDING):  piperacillin/tazobactam IVPB. 3.375 Gram(s) IV Intermittent once  piperacillin/tazobactam IVPB.. 3.375 Gram(s) IV Intermittent once  vancomycin  IVPB 500 milliGRAM(s) IV Intermittent every 12 hours    MEDICATIONS  (PRN):      Anti-coagulation: Eliquis    Allergies:  aspirin (Short breath)  Avelox (Short breath; Pruritus)  cefepime (Anaphylaxis)  codeine (Short breath)  Dilaudid (Short breath)  iodine (Short breath; Swelling)  penicillin (Anaphylaxis)  shellfish (Anaphylaxis)  tetanus toxoid (Short breath)  Valium (Short breath)      Social History:  Smoking        - Never smoked  Alcohol  -No active alcohol conusmption   Illicit Drug use  -No illicit drug use   -Assisted living facility    VITALS:  T(F): 98.8 (23 @ 06:43)  HR: 106 (23 @ 08:56)  BP: 99/55 (23 @ 08:57)  RR: 22 (23 @ 08:56)  SpO2: 93% (23 @ 08:56)               11.9   11.86 )-----------( 183      ( 2023 09:16 )             39.7         137  |  102  |  14  ----------------------------<  231<H>  4.5   |  25  |  1.08    Ca    9.6      2023 00:41    TPro  7.9  /  Alb  3.2<L>  /  TBili  0.4  /  DBili  x   /  AST  30  /  ALT  24  /  AlkPhos  112      Blood Gas Venous - Lactate: 1.8 mmol/L (23 @ 04:15)    PT/INR - ( 2023 00:41 )   PT: 15.6 sec;   INR: 1.34 ratio    PTT - ( 2023 00:41 )  PTT:34.6 sec    Urinalysis Basic - ( 2023 03:18 )  Color: Yellow / Appearance: Clear / S.015 / pH: x  Gluc: x / Ketone: Small  / Bili: Negative / Urobili: Negative   Blood: x / Protein: 15 / Nitrite: Negative   Leuk Esterase: Negative / RBC: 0-2 /HPF / WBC 0-2 /HPF   Sq Epi: x / Non Sq Epi: Occasional / Bacteria: Occasional    CT Abdomen and Pelvis w/ IV Cont (23 @ 04:52)  IMPRESSION:  Bibasilar opacities of the lungs, slightly worsened on the right and   improved on the left. This is compatible with multifocal infection. Given   the distribution, aspiration may be considered.    Postoperative changes, with left lower quadrant end colostomy.   Thick-walled urinary bladder which may represent cystitis. Correlation   with urinalysis is advised.    Focal areas of ill-defined hypoattenuation involving the left kidney.   Possibility of multifocal pyelonephritis is raised. Additional bilateral   hypodense renal lesions are noted, some of which appear compatible with   cysts and others which are indeterminate nature or too small to   characterize. Evaluation is limited due to streak artifact in this   region. Consider follow-up sonographic or repeat CT evaluation.    Aortic dissection which extends from the visualized portions of the   descending thoracic aorta into the abdomen. The true lumen supplies the   mesenteric branch vessels, renal arteries and common iliac arteries.    Cystic lesions of the pancreatic tail. These may represent pseudocysts,   sidebranch type IPMNs or other mucinous lesions. This appears relatively   stable dating back to 2017. Continued q.2 year surveillance is advised to   establish at least 9 years stability (as per 2017 ACR White Paper   recommendations).    Comparison CT Chest No Cont (22 @ 15:00)   Heart and Vessels: The heart is stable in size. No pericardial effusion.   Status post aortic valve replacement and ascending aortic repair for type   A dissection.    Recent TTE Echo Complete w/o Contrast w/ Doppler (22 @ 17:11)  Summary   The left ventricle is normal in size.   Mild concentric left ventricular hypertrophy is present.   Overall LV function appears low-normal.   Estimated left ventricular ejection fraction is 50-55 %.   Right ventricle systolic function appears impaired, TAPSE measuring 1 cm.   There is thickening of both mitral valve leaflets.   Moderate mitral annular calcification is present.   EA reversal of the mitral inflow consistent with reduced compliance of   the   left ventricle.   Normal appearing tricuspid valve structure.   Trace tricuspid valve regurgitation is present.   Normal appearing pulmonic valve structure.   Mild to moderate pulmonic valvular regurgitation is present.   No evidence of pericardial effusion.    Physical Exam:  General: elderly, frail, no acute distress                                                         Neuro: Normal exam oriented to person/place & time with no focal motor or sensory deficits                Neck: no JVD noted  Chest: course breath sounds bilaterally                                                                    CV:  RRR, no murmur, no abdominal bruit auscultated  Carotids: No Bruits appreciated                                                                  GI: soft, NT, ND, normoactive bowel sounds, ostomy LLQ                                                                                             Extremities: No edema b/l lower extremities, warm, dry  Lower Extremity Pulses: + DP, popliteal, femoral pulses b/l lower extremities

## 2023-01-03 NOTE — ED PROVIDER NOTE - CARE PLAN
Principal Discharge DX:	Pneumonia   1 Principal Discharge DX:	Pneumonia  Secondary Diagnosis:	Aortic dissection

## 2023-01-03 NOTE — H&P ADULT - CONVERSATION DETAILS
states no one has ever told her that her mother has dementia but has been seeing her mother's health has been declining fr past 9 months when she has been in and out of hospital and rehabs states no one has ever told her that her mother has dementia but has been seeing her mother's health has been declining fr past 9 months when she has been in and out of hospital and rehabs    but for now she wants CPR- she understands that her mom has not been getting better and that her prognosis is poor states no one has ever told her that her mother has dementia but has been seeing her mother's health has been declining fr past 9 months when she has been in and out of hospital and rehabs    but for now she wants CPR- she understands that her mom has not been getting better and that her prognosis is poor   per daughter patient had the DNR wrist band removed in june when she was told what DNR meant - so she wants to honor mom's wishes and cont CPR

## 2023-01-03 NOTE — CONSULT NOTE ADULT - ASSESSMENT
72y Female with PMHx type 1 aortic dissection s/p Bentall procedure and hemiarch replacement 9/6/2022 with Dr. Cabrera, adrenal insufficiency, aortic insufficiency, asthma, Afib on Eliquis, colorectal cancer s/p resection with colostomy bag, COPD, DM, DVT, pelvic fracture, rectal bleeding, seizure, TIA, tracheobronchomalacia, BIBA to [facility] from [facility] for for fever, tachycardia and vomiting, Further w/u at  significant sepsis 2/2 multifocal pneumonia and possible pyelonephritis. Pt was treated with ABX and fluid resuscitation. CTA abd/pelivs showed aortic dissection which extends from the visualized portions of the descending thoracic aorta into the abdomen with true lumen supplying the mesenteric branch vessels, renal arteries and common iliac arteries. CT surgery was consulted by  for aortic dissection. Pt arrived to [facility] ED, was seen and examined on consult by CTS team. Patient lying in bed in NAD. Pt with SBP in the 90s after fluid boluses. Mentation is appropriate. Pt with hx of dementia, but answers all questions appropriately. Denies fevers, chills, lightheadedness, dizziness, HA, CP, palpitations, SOB, cough, abdominal pain, N/V/D. Pt's only chief complain at this is buttock pain.    Pt transferred to [facility] 2/2 aortic dissection found on imaging at . Upon further chart review, pt with known hx of type 1 aortic dissection s/p repair 9/2022 with known residual type 2 which has been unchanged from prior imaging. There is no chest pain or abdominal pain, H&H is stable. No evidence of end-organ dysfunction. Per radiology report on CT scan, the aortic  true lumen supplies the mesenteric branch vessels, renal arteries and common iliac arteries. Pt's hypotensive likely r/t infection and sepsis. Case discussed with Dr. Mason.

## 2023-01-03 NOTE — CONSULT NOTE ADULT - SUBJECTIVE AND OBJECTIVE BOX
Vascular Attending:  Farzana      HPI: 72 year old transfer from [place], see ED HPI below.  Concern for aortic dissection with no performed radiograph except  CXR. No pain complAINTS AT THIS TIME, NO chest or abd pain. No fever/chills.       HPI: 72F hx late dementia, adrenal insufficiency, aortic insufficiency, asthma, Afib on Eliquis, colorectal cancer s/p resection with colostomy bag, COPD, DM, DVT, pelvic fracture, rectal bleeding, seizure, TIA, tracheobronchomalacia, type I aortic dissection s/p Bentall procedure and hemiarch replacement  with Dr. Cabrera, recent admission fo r sepsis 2/2 pneumonia transferred from [facility] with hypoxia and hypotension with +RSV, possible overlying pneumonia and pyelo on CT, concerning for sepsis. Vascular aware of transfer and at bedside.  Patient given IVF and zosyn at prior hospital. Patient poor historian      PAST MEDICAL & SURGICAL HISTORY:  Atrial fibrillation  paroxysmal, on eliquis      Diabetes  Type 2      COPD (chronic obstructive pulmonary disease)      Adrenal insufficiency  Medrol daily for over 50 years      Aortic insufficiency  moderate AR on echo 5/3/2018      Pelvic fracture      Asthma      Tracheobronchomalacia  diagnosed , s/p bronchial thermoplasty  (Dr Zapien); recent bronchoscopy 2018 revealed no evidence of tracheobronchomalacia in trachea or bronchial tubes      Colorectal cancer  2018- last treatment , chemo and radiation      Rectal bleeding      Seizure  x 1 18      DVT (deep venous thrombosis)  15-20 years ago, took coumadin      TIA (transient ischemic attack)  multiple, last 5 years ago - presents as right-sided weakness      History of partial hysterectomy  30 years ago - fibroids      H/O total knee replacement, bilateral  5 years ago      History of sinus surgery  multiple sinus surgeries      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      H/O pelvic surgery  5 years ago - s/p fracture      History of tracheomalacia   - attempted tracheal stenting ([facility])- course complicated by obstruction, respiratory failure, multiple CPR attempts -  stent discontinued; 10/20/2016 Tracheobronchoplasty (Prolene Mesh) performed at [facility] by Dr Zapien      S/P bronchoscopy  2018 - [facility] (Dr Zapien) no evidence of tracheobronchomalacia in trachea or bronchial tubes      Rectal bleeding  exam under anesthesia ([facility]) 2018          REVIEW OF SYSTEMS:  see hpi       General:	    Skin/Breast:  	  Ophthalmologic:  	  ENMT:	    Respiratory and Thorax:  	  Cardiovascular:	    Gastrointestinal:	    Genitourinary:	    Musculoskeletal:	    Neurological:	    Psychiatric:	    Hematology/Lymphatics:	    Endocrine:	    Allergic/Immunologic:	    MEDICATIONS  (STANDING):  piperacillin/tazobactam IVPB.. 3.375 Gram(s) IV Intermittent once  vancomycin  IVPB 500 milliGRAM(s) IV Intermittent every 12 hours    MEDICATIONS  (PRN):      Allergies    aspirin (Short breath)  Avelox (Short breath; Pruritus)  cefepime (Anaphylaxis)  codeine (Short breath)  Dilaudid (Short breath)  iodine (Short breath; Swelling)  penicillin (Anaphylaxis)  shellfish (Anaphylaxis)  tetanus toxoid (Short breath)  Valium (Short breath)    Intolerances        SOCIAL HISTORY: non contributy       Vital Signs Last 24 Hrs  T(C): 37.1 (2023 06:43), Max: 38.3 (2023 00:24)  T(F): 98.8 (2023 06:43), Max: 100.9 (2023 00:24)  HR: 106 (2023 08:56) (100 - 131)  BP: 99/55 (2023 08:57) (79/57 - 161/117)  BP(mean): 67 (2023 07:00) (66 - 109)  RR: 22 (2023 08:56) (16 - 28)  SpO2: 93% (2023 08:56) (93% - 97%)    Parameters below as of 2023 08:56  Patient On (Oxygen Delivery Method): room air        PHYSICAL EXAM:      Constitutional: no distress,     Eyes: exophthalmos     ENMT: poor dentition     Neck: supple, some JVD       Respiratory: non labored     Cardiovascular: s1/s2     Gastrointestinal: left ostomy bag present with stool     Genitourinary: ramirez present     Rectal: not examined     Extremities: warm, no edema, bilateral foot arch abnormality     Vascular: palpable pedals, femorals, brachials     Neurological: ?left Le motor deficit,  not very co-operative with exam     Skin: no ulcer     Psychiatric: odd affect       Pulses:   Right:                                                                          Left:  FEM [ x]2+ [ ]1+ [ ]doppler                                             FEM [ x]2+ [ ]1+ [ ]doppler    POP [x ]2+ [ ]1+ [ ]doppler                                             POP [x ]2+ [ ]1+ [ ]doppler    DP [x ]2+ [ ]1+ [ ]doppler                                                DP [ x]2+ [ ]1+ [ ]doppler  PT[ ]2+ [ ]1+ [ ]doppler                                                  PT [ ]2+ [ ]1+ [ ]doppler      LABS:                        11.9   11.86 )-----------( 183      ( 2023 09:16 )             39.7         137  |  102  |  14  ----------------------------<  231<H>  4.5   |  25  |  1.08    Ca    9.6      2023 00:41    TPro  7.9  /  Alb  3.2<L>  /  TBili  0.4  /  DBili  x   /  AST  30  /  ALT  24  /  AlkPhos  112  -03    PT/INR - ( 2023 09:16 )   PT: 16.3 sec;   INR: 1.40 ratio         PTT - ( 2023 09:16 )  PTT:32.7 sec  Urinalysis Basic - ( 2023 03:18 )    Color: Yellow / Appearance: Clear / S.015 / pH: x  Gluc: x / Ketone: Small  / Bili: Negative / Urobili: Negative   Blood: x / Protein: 15 / Nitrite: Negative   Leuk Esterase: Negative / RBC: 0-2 /HPF / WBC 0-2 /HPF   Sq Epi: x / Non Sq Epi: Occasional / Bacteria: Occasional        RADIOLOGY & ADDITIONAL STUDIES    Impression and Plan:    concern for taa/aaA     -  NO abd or chest pain at this time   - will follow up CTA  Vascular Attending:  Farzana      HPI: 72 year old transfer from [place], see ED HPI below.  Concern for aortic dissection with no performed radiograph except  CXR and chest CT at [place]. No pain complAINTS AT THIS TIME, NO chest or abd pain. No fever/chills.       HPI: 72F hx late dementia, adrenal insufficiency, aortic insufficiency, asthma, Afib on Eliquis, colorectal cancer s/p resection with colostomy bag, COPD, DM, DVT, pelvic fracture, rectal bleeding, seizure, TIA, tracheobronchomalacia, type I aortic dissection s/p Bentall procedure and hemiarch replacement  with Dr. Cabrera, recent admission fo r sepsis 2/2 pneumonia transferred from [facility] with hypoxia and hypotension with +RSV, possible overlying pneumonia and pyelo on CT, concerning for sepsis. Vascular aware of transfer and at bedside.  Patient given IVF and zosyn at prior hospital. Patient poor historian      PAST MEDICAL & SURGICAL HISTORY:  Atrial fibrillation  paroxysmal, on eliquis      Diabetes  Type 2      COPD (chronic obstructive pulmonary disease)      Adrenal insufficiency  Medrol daily for over 50 years      Aortic insufficiency  moderate AR on echo 5/3/2018      Pelvic fracture      Asthma      Tracheobronchomalacia  diagnosed , s/p bronchial thermoplasty  (Dr Zapien); recent bronchoscopy 2018 revealed no evidence of tracheobronchomalacia in trachea or bronchial tubes      Colorectal cancer  2018- last treatment , chemo and radiation      Rectal bleeding      Seizure  x 1 18      DVT (deep venous thrombosis)  15-20 years ago, took coumadin      TIA (transient ischemic attack)  multiple, last 5 years ago - presents as right-sided weakness      History of partial hysterectomy  30 years ago - fibroids      H/O total knee replacement, bilateral  5 years ago      History of sinus surgery  multiple sinus surgeries      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      H/O pelvic surgery  5 years ago - s/p fracture      History of tracheomalacia   - attempted tracheal stenting ([facility])- course complicated by obstruction, respiratory failure, multiple CPR attempts -  stent discontinued; 10/20/2016 Tracheobronchoplasty (Prolene Mesh) performed at [facility] by Dr Zapien      S/P bronchoscopy  2018 - [facility] (Dr Zapien) no evidence of tracheobronchomalacia in trachea or bronchial tubes      Rectal bleeding  exam under anesthesia ([facility]) 2018          REVIEW OF SYSTEMS:  see hpi       General:	    Skin/Breast:  	  Ophthalmologic:  	  ENMT:	    Respiratory and Thorax:  	  Cardiovascular:	    Gastrointestinal:	    Genitourinary:	    Musculoskeletal:	    Neurological:	    Psychiatric:	    Hematology/Lymphatics:	    Endocrine:	    Allergic/Immunologic:	    MEDICATIONS  (STANDING):  piperacillin/tazobactam IVPB.. 3.375 Gram(s) IV Intermittent once  vancomycin  IVPB 500 milliGRAM(s) IV Intermittent every 12 hours    MEDICATIONS  (PRN):      Allergies    aspirin (Short breath)  Avelox (Short breath; Pruritus)  cefepime (Anaphylaxis)  codeine (Short breath)  Dilaudid (Short breath)  iodine (Short breath; Swelling)  penicillin (Anaphylaxis)  shellfish (Anaphylaxis)  tetanus toxoid (Short breath)  Valium (Short breath)    Intolerances        SOCIAL HISTORY: non contributy       Vital Signs Last 24 Hrs  T(C): 37.1 (2023 06:43), Max: 38.3 (2023 00:24)  T(F): 98.8 (2023 06:43), Max: 100.9 (2023 00:24)  HR: 106 (2023 08:56) (100 - 131)  BP: 99/55 (2023 08:57) (79/57 - 161/117)  BP(mean): 67 (2023 07:00) (66 - 109)  RR: 22 (2023 08:56) (16 - 28)  SpO2: 93% (2023 08:56) (93% - 97%)    Parameters below as of 2023 08:56  Patient On (Oxygen Delivery Method): room air        PHYSICAL EXAM:      Constitutional: no distress,     Eyes: exophthalmos     ENMT: poor dentition     Neck: supple, some JVD       Respiratory: non labored     Cardiovascular: s1/s2     Gastrointestinal: left ostomy bag present with stool     Genitourinary: ramirez present     Rectal: not examined     Extremities: warm, no edema, bilateral foot arch abnormality     Vascular: palpable pedals, femorals, brachials     Neurological: ?left Le motor deficit,  not very co-operative with exam     Skin: no ulcer     Psychiatric: odd affect       Pulses:   Right:                                                                          Left:  FEM [ x]2+ [ ]1+ [ ]doppler                                             FEM [ x]2+ [ ]1+ [ ]doppler    POP [x ]2+ [ ]1+ [ ]doppler                                             POP [x ]2+ [ ]1+ [ ]doppler    DP [x ]2+ [ ]1+ [ ]doppler                                                DP [ x]2+ [ ]1+ [ ]doppler  PT[ ]2+ [ ]1+ [ ]doppler                                                  PT [ ]2+ [ ]1+ [ ]doppler      LABS:                        11.9   11.86 )-----------( 183      ( 2023 09:16 )             39.7         137  |  102  |  14  ----------------------------<  231<H>  4.5   |  25  |  1.08    Ca    9.6      2023 00:41    TPro  7.9  /  Alb  3.2<L>  /  TBili  0.4  /  DBili  x   /  AST  30  /  ALT  24  /  AlkPhos  112  -03    PT/INR - ( 2023 09:16 )   PT: 16.3 sec;   INR: 1.40 ratio         PTT - ( 2023 09:16 )  PTT:32.7 sec  Urinalysis Basic - ( 2023 03:18 )    Color: Yellow / Appearance: Clear / S.015 / pH: x  Gluc: x / Ketone: Small  / Bili: Negative / Urobili: Negative   Blood: x / Protein: 15 / Nitrite: Negative   Leuk Esterase: Negative / RBC: 0-2 /HPF / WBC 0-2 /HPF   Sq Epi: x / Non Sq Epi: Occasional / Bacteria: Occasional        RADIOLOGY & ADDITIONAL STUDIES    Impression and Plan:    concern for taa/aaA   past history of type A repair     -  NO abd or chest pain at this time   - will follow up CTA

## 2023-01-03 NOTE — ED PROVIDER NOTE - OBJECTIVE STATEMENT
73 y/o female with h/o multiple medical issues from the [facility] in ED with fever, tachycardia and vomiting today.   pt with h/o dementia unable to give further history.   per EMS, temp at .6,   active vomiting in ED.

## 2023-01-03 NOTE — ED PROVIDER NOTE - CLINICAL SUMMARY MEDICAL DECISION MAKING FREE TEXT BOX
72F hx late dementia, adrenal insufficiency, aortic insufficiency, asthma, Afib on Eliquis, colorectal cancer s/p resection with colostomy bag, COPD, DM, DVT, pelvic fracture, rectal bleeding, seizure, TIA, tracheobronchomalacia, type I aortic dissection s/p Bentall procedure and hemiarch replacement 9/6 with Dr. Cabrera, recent admission fo r sepsis 2/2 pneumonia transferred from [facility] with hypoxia and hypotension with +RSV, possible overlying pneumonia and pyelo on CT, concerning for sepsis.  Patient was sent here out of concern for dissection, however vascular team has evaluated and state it is stable and requires no further intervention.  IVF and antibiotics continued upon arrival with repeat laboratory studies which remain stable.  Patient to be admitted to medicine for further management of sepsis.

## 2023-01-03 NOTE — ED ADULT TRIAGE NOTE - HOSPITALS/PSYCHIATRIC FACILITIES
- Follow-up 1 year with ophthalmology exam.       -Samantha Noe MD  Specialty: Dermatology  [facility]  [address]  [address]  [place] [address]  Office: [phone]   [facility]

## 2023-01-03 NOTE — ED ADULT NURSE REASSESSMENT NOTE - NS ED NURSE REASSESS COMMENT FT1
Pt continues to be actively vomiting at this time- MD Andersen made aware and states to give patient 10mg of reglan IVP at this time.

## 2023-01-03 NOTE — ED ADULT NURSE REASSESSMENT NOTE - NS ED NURSE REASSESS COMMENT FT1
Made MD Solorzano aware pt still altered, hypotensive, and tachycardic. Pt has been requiring suction from the mouth due to thick sputum and inability to clear sputum. Pt's daughter at bedside, would like to speak to MD concerning home medication.

## 2023-01-03 NOTE — ED PROVIDER NOTE - ATTENDING CONTRIBUTION TO CARE
I, Marbella Lyon DO, have personally provided 45 minutes of critical care time exclusive of time spent on separately billable procedures. Time includes review of laboratory data, radiology results, discussion with consultants, and monitoring for potential decompensation. Interventions were performed as documented above.     I personally saw the patient with the resident, and completed the key components of the history and physical exam. I then discussed the management plan with the resident.    71 y/o F with PMH dementia, A fib on Eliquis, DM, COPD, recent Type A dissection repair in Sept 2022 with Dr. Cabrera at [facility] with resulting Type B presents as transfer for CT surgery and vascular evaluation after she presented to [place] ED for abdominal pain, was hypotensive and hypoxic, was found to be RSV+, multifocal PNA and possible pyelonephritis. She got 3L NS and Zosyn prior to transfer.    I agree with exam as documented.    CT surgery and vascular surgery evaluated patient. Will continue ABx, patient stably mildly hypotensive - will monitor - if continues to drop will start levo, will require admission.

## 2023-01-03 NOTE — ED ADULT NURSE NOTE - CHIEF COMPLAINT QUOTE
Pt is a transfer from [facility] fro aortic dissection. Pt was given 3L of NS PTA for hypotension, pt still hypotensive and c/o abdominal pain. Pt is also RSV+

## 2023-01-03 NOTE — H&P ADULT - NSHPPHYSICALEXAM_GEN_ALL_CORE
GENERAL: unresponsive , non verbal  HEAD:  Atraumatic, Normocephalic  EYES: closed eyes   ENMT: No , exudates, or enlargement;   NECK: Supple, No JVD,   NERVOUS SYSTEM:  barely moves upper and lower extremities;   CHEST/LUNG: Clear to percussion bilaterally; No rales, rhonchi, wheezing, or rubs  HEART: Regular rate and rhythm; No murmurs, rubs, or gallops  ABDOMEN: Soft, Nontender, Nondistended; Bowel sounds present  EXTREMITIES:  No edema  LYMPH: No lymphadenopathy noted  SKIN: present opn admission multiple decubitus wounds - daughter showed me pics of back heel and elbow GENERAL: unresponsive , non verbal at times opens eyes randomly  HEAD:  Atraumatic, Normocephalic  EYES: closed eyes   ENMT: No , exudates, or enlargement;   NECK: Supple, No JVD,   NERVOUS SYSTEM:  barely moves upper and lower extremities;   CHEST/LUNG: Clear to percussion bilaterally; No rales, rhonchi, wheezing, or rubs  HEART: Regular rate and rhythm; No murmurs, rubs, or gallops  ABDOMEN: Soft, Nontender, Nondistended; Bowel sounds present  EXTREMITIES:  No edema  LYMPH: No lymphadenopathy noted  SKIN: present opn admission multiple decubitus wounds - daughter showed me pics of back heel and elbow

## 2023-01-03 NOTE — ED ADULT TRIAGE NOTE - CHIEF COMPLAINT QUOTE
Pt is a transfer from [facility] fro aortic dissection. Pt was given 3L of NS PTA for hypotension, pt still hypotensive and c/o abdominal pain. Pt is also RSV+

## 2023-01-03 NOTE — ED PROVIDER NOTE - PROGRESS NOTE DETAILS
case  d/w Patane about aortic dissection and recommend consult vascular.   pending vascular call back Discussed wit Dr Kent vascular, rec transfer [place], disc with CTICU and vascular at [place], accepted, discussed with daughter, full code, aware of plan. Pt had valvular disease and prior dissection according to daughter.

## 2023-01-03 NOTE — ED PROVIDER NOTE - PHYSICAL EXAMINATION
General: NAD, ill appearing  HEENT: Normocephalic, atraumatic  Neck: No apparent stiffness or JVD  Pulm: Chest wall symmetric and nontender, lungs coarse sounding bilaterally  Cardiac: Fast rate and regular rhythm  Abdomen: Nontender and nondistended  Skin: Skin is warm, dry and intact without rashes or lesions.  Neuro: No motor or sensory deficits above reported baseline  MSK: No deformity or tenderness above reported baseline General: NAD, ill appearing  HEENT: Normocephalic, atraumatic  Neck: No apparent stiffness or JVD  Pulm: Chest wall symmetric and nontender, lungs coarse sounding bilaterally  Cardiac: Fast rate and regular rhythm  Abdomen: Nontender and nondistended  Skin: Multiple stasis and pressure ulcers elbows, legs, sacrum.    Neuro: No motor or sensory deficits above reported baseline  MSK: No deformity or tenderness above reported baseline

## 2023-01-03 NOTE — ED PROVIDER NOTE - NSICDXPASTSURGICALHX_GEN_ALL_CORE_FT
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    H/O pelvic surgery 5 years ago - s/p fracture    H/O total knee replacement, bilateral 5 years ago    History of partial hysterectomy 30 years ago - fibroids    History of sinus surgery multiple sinus surgeries    History of tracheomalacia 2015 - attempted tracheal stenting ([facility])- course complicated by obstruction, respiratory failure, multiple CPR attempts -  stent discontinued; 10/20/2016 Tracheobronchoplasty (Prolene Mesh) performed at [facility] by Dr Zapien    Rectal bleeding exam under anesthesia ([facility]) 2/2018    S/P bronchoscopy 6/5/2018 - [facility] (Dr Zapien) no evidence of tracheobronchomalacia in trachea or bronchial tubes

## 2023-01-04 LAB
ALBUMIN SERPL ELPH-MCNC: 2.6 G/DL — LOW (ref 3.3–5.2)
ALP SERPL-CCNC: 80 U/L — SIGNIFICANT CHANGE UP (ref 40–120)
ALT FLD-CCNC: 17 U/L — SIGNIFICANT CHANGE UP
ANION GAP SERPL CALC-SCNC: 14 MMOL/L — SIGNIFICANT CHANGE UP (ref 5–17)
ANISOCYTOSIS BLD QL: SLIGHT — SIGNIFICANT CHANGE UP
AST SERPL-CCNC: 42 U/L — HIGH
BASOPHILS # BLD AUTO: 0 K/UL — SIGNIFICANT CHANGE UP (ref 0–0.2)
BASOPHILS NFR BLD AUTO: 0 % — SIGNIFICANT CHANGE UP (ref 0–2)
BILIRUB SERPL-MCNC: 0.3 MG/DL — LOW (ref 0.4–2)
BUN SERPL-MCNC: 11.4 MG/DL — SIGNIFICANT CHANGE UP (ref 8–20)
CALCIUM SERPL-MCNC: 8.1 MG/DL — LOW (ref 8.4–10.5)
CHLORIDE SERPL-SCNC: 108 MMOL/L — SIGNIFICANT CHANGE UP (ref 96–108)
CO2 SERPL-SCNC: 19 MMOL/L — LOW (ref 22–29)
CREAT SERPL-MCNC: 0.62 MG/DL — SIGNIFICANT CHANGE UP (ref 0.5–1.3)
CULTURE RESULTS: NO GROWTH — SIGNIFICANT CHANGE UP
EGFR: 93 ML/MIN/1.73M2 — SIGNIFICANT CHANGE UP
EOSINOPHIL # BLD AUTO: 0 K/UL — SIGNIFICANT CHANGE UP (ref 0–0.5)
EOSINOPHIL NFR BLD AUTO: 0 % — SIGNIFICANT CHANGE UP (ref 0–6)
GLUCOSE BLDC GLUCOMTR-MCNC: 167 MG/DL — HIGH (ref 70–99)
GLUCOSE BLDC GLUCOMTR-MCNC: 176 MG/DL — HIGH (ref 70–99)
GLUCOSE BLDC GLUCOMTR-MCNC: 190 MG/DL — HIGH (ref 70–99)
GLUCOSE BLDC GLUCOMTR-MCNC: 224 MG/DL — HIGH (ref 70–99)
GLUCOSE SERPL-MCNC: 166 MG/DL — HIGH (ref 70–99)
GRAM STN FLD: SIGNIFICANT CHANGE UP
HCT VFR BLD CALC: 39.4 % — SIGNIFICANT CHANGE UP (ref 34.5–45)
HGB BLD-MCNC: 11.6 G/DL — SIGNIFICANT CHANGE UP (ref 11.5–15.5)
LACTATE SERPL-SCNC: 1.4 MMOL/L — SIGNIFICANT CHANGE UP (ref 0.5–2)
LYMPHOCYTES # BLD AUTO: 0.72 K/UL — LOW (ref 1–3.3)
LYMPHOCYTES # BLD AUTO: 6.2 % — LOW (ref 13–44)
MANUAL SMEAR VERIFICATION: SIGNIFICANT CHANGE UP
MCHC RBC-ENTMCNC: 21.9 PG — LOW (ref 27–34)
MCHC RBC-ENTMCNC: 29.4 GM/DL — LOW (ref 32–36)
MCV RBC AUTO: 74.3 FL — LOW (ref 80–100)
MICROCYTES BLD QL: SLIGHT — SIGNIFICANT CHANGE UP
MONOCYTES # BLD AUTO: 0.1 K/UL — SIGNIFICANT CHANGE UP (ref 0–0.9)
MONOCYTES NFR BLD AUTO: 0.9 % — LOW (ref 2–14)
MYELOCYTES NFR BLD: 0.9 % — HIGH (ref 0–0)
NEUTROPHILS # BLD AUTO: 10.6 K/UL — HIGH (ref 1.8–7.4)
NEUTROPHILS NFR BLD AUTO: 87.6 % — HIGH (ref 43–77)
NEUTS BAND # BLD: 3.5 % — SIGNIFICANT CHANGE UP (ref 0–8)
OVALOCYTES BLD QL SMEAR: SLIGHT — SIGNIFICANT CHANGE UP
PLAT MORPH BLD: NORMAL — SIGNIFICANT CHANGE UP
PLATELET # BLD AUTO: 163 K/UL — SIGNIFICANT CHANGE UP (ref 150–400)
POIKILOCYTOSIS BLD QL AUTO: SLIGHT — SIGNIFICANT CHANGE UP
POLYCHROMASIA BLD QL SMEAR: SLIGHT — SIGNIFICANT CHANGE UP
POTASSIUM SERPL-MCNC: 3.8 MMOL/L — SIGNIFICANT CHANGE UP (ref 3.5–5.3)
POTASSIUM SERPL-SCNC: 3.8 MMOL/L — SIGNIFICANT CHANGE UP (ref 3.5–5.3)
PROT SERPL-MCNC: 5.8 G/DL — LOW (ref 6.6–8.7)
RBC # BLD: 5.3 M/UL — HIGH (ref 3.8–5.2)
RBC # FLD: 19.3 % — HIGH (ref 10.3–14.5)
RBC BLD AUTO: ABNORMAL
SCHISTOCYTES BLD QL AUTO: SLIGHT — SIGNIFICANT CHANGE UP
SMUDGE CELLS # BLD: PRESENT — SIGNIFICANT CHANGE UP
SODIUM SERPL-SCNC: 141 MMOL/L — SIGNIFICANT CHANGE UP (ref 135–145)
SPECIMEN SOURCE: SIGNIFICANT CHANGE UP
TARGETS BLD QL SMEAR: SLIGHT — SIGNIFICANT CHANGE UP
VANCOMYCIN TROUGH SERPL-MCNC: 10 UG/ML — SIGNIFICANT CHANGE UP (ref 10–20)
VARIANT LYMPHS # BLD: 0.9 % — SIGNIFICANT CHANGE UP (ref 0–6)
WBC # BLD: 11.64 K/UL — HIGH (ref 3.8–10.5)
WBC # FLD AUTO: 11.64 K/UL — HIGH (ref 3.8–10.5)

## 2023-01-04 PROCEDURE — 99233 SBSQ HOSP IP/OBS HIGH 50: CPT

## 2023-01-04 RX ORDER — ONDANSETRON 8 MG/1
4 TABLET, FILM COATED ORAL EVERY 6 HOURS
Refills: 0 | Status: DISCONTINUED | OUTPATIENT
Start: 2023-01-04 | End: 2023-01-12

## 2023-01-04 RX ORDER — TAMSULOSIN HYDROCHLORIDE 0.4 MG/1
0.4 CAPSULE ORAL AT BEDTIME
Refills: 0 | Status: DISCONTINUED | OUTPATIENT
Start: 2023-01-04 | End: 2023-01-12

## 2023-01-04 RX ORDER — INSULIN LISPRO 100/ML
VIAL (ML) SUBCUTANEOUS
Refills: 0 | Status: DISCONTINUED | OUTPATIENT
Start: 2023-01-04 | End: 2023-01-12

## 2023-01-04 RX ORDER — ACETAMINOPHEN 500 MG
650 TABLET ORAL EVERY 6 HOURS
Refills: 0 | Status: DISCONTINUED | OUTPATIENT
Start: 2023-01-04 | End: 2023-01-12

## 2023-01-04 RX ORDER — PANTOPRAZOLE SODIUM 20 MG/1
40 TABLET, DELAYED RELEASE ORAL DAILY
Refills: 0 | Status: DISCONTINUED | OUTPATIENT
Start: 2023-01-04 | End: 2023-01-12

## 2023-01-04 RX ORDER — ACETAMINOPHEN 500 MG
1000 TABLET ORAL ONCE
Refills: 0 | Status: COMPLETED | OUTPATIENT
Start: 2023-01-04 | End: 2023-01-04

## 2023-01-04 RX ORDER — INFLUENZA VIRUS VACCINE 15; 15; 15; 15 UG/.5ML; UG/.5ML; UG/.5ML; UG/.5ML
0.7 SUSPENSION INTRAMUSCULAR ONCE
Refills: 0 | Status: DISCONTINUED | OUTPATIENT
Start: 2023-01-04 | End: 2023-01-12

## 2023-01-04 RX ADMIN — MAGNESIUM OXIDE 400 MG ORAL TABLET 400 MILLIGRAM(S): 241.3 TABLET ORAL at 15:27

## 2023-01-04 RX ADMIN — Medication 400 MILLIGRAM(S): at 05:08

## 2023-01-04 RX ADMIN — Medication 1: at 08:05

## 2023-01-04 RX ADMIN — Medication 100 MILLIGRAM(S): at 21:39

## 2023-01-04 RX ADMIN — FLUCONAZOLE 100 MILLIGRAM(S): 150 TABLET ORAL at 07:54

## 2023-01-04 RX ADMIN — Medication 50 MILLIGRAM(S): at 15:28

## 2023-01-04 RX ADMIN — MEROPENEM 1000 MILLIGRAM(S): 1 INJECTION INTRAVENOUS at 20:54

## 2023-01-04 RX ADMIN — TAMSULOSIN HYDROCHLORIDE 0.4 MILLIGRAM(S): 0.4 CAPSULE ORAL at 20:55

## 2023-01-04 RX ADMIN — APIXABAN 5 MILLIGRAM(S): 2.5 TABLET, FILM COATED ORAL at 06:22

## 2023-01-04 RX ADMIN — Medication 1000 MILLIGRAM(S): at 05:38

## 2023-01-04 RX ADMIN — POLYETHYLENE GLYCOL 3350 17 GRAM(S): 17 POWDER, FOR SOLUTION ORAL at 17:29

## 2023-01-04 RX ADMIN — MEXILETINE HYDROCHLORIDE 200 MILLIGRAM(S): 150 CAPSULE ORAL at 15:27

## 2023-01-04 RX ADMIN — ALBUTEROL 2 PUFF(S): 90 AEROSOL, METERED ORAL at 08:47

## 2023-01-04 RX ADMIN — Medication 50 MILLIGRAM(S): at 00:47

## 2023-01-04 RX ADMIN — MEROPENEM 1000 MILLIGRAM(S): 1 INJECTION INTRAVENOUS at 06:23

## 2023-01-04 RX ADMIN — MAGNESIUM OXIDE 400 MG ORAL TABLET 400 MILLIGRAM(S): 241.3 TABLET ORAL at 06:22

## 2023-01-04 RX ADMIN — Medication 1: at 17:31

## 2023-01-04 RX ADMIN — MEROPENEM 1000 MILLIGRAM(S): 1 INJECTION INTRAVENOUS at 15:28

## 2023-01-04 RX ADMIN — MAGNESIUM OXIDE 400 MG ORAL TABLET 400 MILLIGRAM(S): 241.3 TABLET ORAL at 20:55

## 2023-01-04 RX ADMIN — ALBUTEROL 2 PUFF(S): 90 AEROSOL, METERED ORAL at 20:50

## 2023-01-04 RX ADMIN — MEXILETINE HYDROCHLORIDE 200 MILLIGRAM(S): 150 CAPSULE ORAL at 20:55

## 2023-01-04 RX ADMIN — TIOTROPIUM BROMIDE 2 PUFF(S): 18 CAPSULE ORAL; RESPIRATORY (INHALATION) at 08:50

## 2023-01-04 RX ADMIN — Medication 50 MILLIGRAM(S): at 20:55

## 2023-01-04 RX ADMIN — ALBUTEROL 2 PUFF(S): 90 AEROSOL, METERED ORAL at 14:49

## 2023-01-04 RX ADMIN — Medication 1 APPLICATION(S): at 06:23

## 2023-01-04 RX ADMIN — Medication 100 MILLIGRAM(S): at 12:20

## 2023-01-04 RX ADMIN — APIXABAN 5 MILLIGRAM(S): 2.5 TABLET, FILM COATED ORAL at 17:29

## 2023-01-04 RX ADMIN — Medication 1: at 12:18

## 2023-01-04 RX ADMIN — MEXILETINE HYDROCHLORIDE 200 MILLIGRAM(S): 150 CAPSULE ORAL at 06:23

## 2023-01-04 RX ADMIN — Medication 1 APPLICATION(S): at 18:55

## 2023-01-04 RX ADMIN — Medication 50 MILLIGRAM(S): at 07:54

## 2023-01-04 RX ADMIN — POLYETHYLENE GLYCOL 3350 17 GRAM(S): 17 POWDER, FOR SOLUTION ORAL at 06:23

## 2023-01-04 RX ADMIN — Medication 100 MILLIGRAM(S): at 02:15

## 2023-01-04 RX ADMIN — Medication 2: at 21:30

## 2023-01-04 NOTE — SWALLOW BEDSIDE ASSESSMENT ADULT - DIET PRIOR TO ADMI
As per pt: regular solids, thin fluids (pt reports her diet was advanced at rehab upon discharge from [facility])

## 2023-01-04 NOTE — PATIENT PROFILE ADULT - FUNCTIONAL ASSESSMENT - BASIC MOBILITY 6.
1-calculated by average/Not able to assess (calculate score using [facility] averaging method)

## 2023-01-04 NOTE — PROGRESS NOTE ADULT - ASSESSMENT
82 year old female   TRANSFER  from [place] for Aortic aneurysm evaluation-y  BIBA from [facility] for worsening SOB since last night [place] -tx to [facility] ,seen by Ct Sx ->and no surgical intervention advised  significant medical hx includes but is not limited to adrenal insufficiency, aortic insufficiency, asthma, Afib on Eliquis, colorectal cancer s/p resection with colostomy bag, COPD, DM, DVT, pelvic fracture, rectal bleeding, seizure, TIA, tracheobronchomalacia, type I aortic dissection s/p Bentall procedure and hemiarch replacement 9/6   patient is unresponsive and hypoxic and hypotensive - intermittent opens eyes   currently lethargic- daughter at bedside -  -    PT HAS HX OF MRSA AND CANDIDEMIA AND HAS BEEN ON CHRONIC SUPPRESSIVE BACTRIM AND DIFLUCAN SINCE  HOSPITALIZATION AT [facility]  BLOOD CX X2 SENT WILL FOLLOWUP   PLACED ON BROAD SPECTRUM ABX AND ANTIFUNGAL  WILL CONTINUE FOR NOW  PT WITH RVP POS RSV  NO TREATMENT FOR THIS  PT TO BE PLACED IN MONITORED SETTING  CT SURGERY EVAL NOTED   PT IS MORE AWAKE ALERT   OVERALL IMPROVED WILL FOLLOWUP CX    S   SUGGEST SPEECH AND SWALLOW EVAL  WILL  FOLLOWUP  WITH FURTHER RECOMMENDATIONS

## 2023-01-04 NOTE — PROGRESS NOTE ADULT - ASSESSMENT
72 year old female with recurrent hospitalization and complex medical history including Fungemia on Fluconazole, Adrenal Insufficiency, Type I Aortic Dissection s/p Bentall Procedure / Hemiarch Replacement on 9/6, Tracheobronchomalacia, Colon Cancer s/p Resection with Colostomy, Paroxysmal A. Fib on Eliquis, TIA, Seizures, DM 2, DVT, COPD and Pelvic Fracture presented to [place] ER from [facility] with fever, tachycardia and vomiting -- found to have aortic dissection so she was transferred to [facility] for evaluation.     1) Sepsis   - Likely due to Pneumonia (Aspiration vs HCAP - Gram Negative Rods vs MRSA) and Pyelonephritis  - Follow cultures  - Continue Meropenem and Vancomycin   - ID Consult appreciated     2) Aortic Dissection   - Likely chronic   - No intervention or repeat imaging as per CTS or Vascular Surgery     3) History of Dysphagia   - Will consult speech therapy for evaluation  - Puree with mildly thick liquids based on previous eval     4) History of Fungemia   - Continue Fluconazole   - ID following     5) Adrenal Insufficiency   - Continue stress dose steroids     INCOMPLETE 72 year old female with recurrent hospitalization and complex medical history including Adrenal Insufficiency, Type I Aortic Dissection s/p Bentall Procedure / Hemiarch Replacement on 9/7, Tracheobronchomalacia, Colon Cancer s/p Resection with Colostomy, Paroxysmal A. Fib on Eliquis, TIA, Seizures, DM 2, DVT, COPD, Fungemia on Fluconazole, Right Foot Osteomyelitis and Pelvic Fracture presented to [place] ER from [facility] with fever, tachycardia and vomiting -- found to have aortic dissection so she was transferred to [facility] for evaluation.     1) Sepsis   - Likely due to Pneumonia (Aspiration vs HCAP - Gram Negative Rods vs MRSA) and Pyelonephritis  - Follow cultures  - Continue Meropenem and Vancomycin   - ID Consult appreciated     2) Aortic Dissection   - Likely chronic   - No intervention or repeat imaging as per CTS or Vascular Surgery     3) History of Dysphagia   - Will consult speech therapy for evaluation  - Puree with mildly thick liquids based on previous eval     4) Metabolic Encephalopathy  - Likely due to Sepsis  - Improving     5) History of Fungemia   - Continue Fluconazole   - ID following     6) Adrenal Insufficiency   - Continue stress dose steroids     7) PAF  - Continue Mexiletine and Eliquis     8) DM 2  - HbA1c 8.6 on 12/15/22  - Accu checks and ISS    9) COPD  - Continue Spiriva and Albuterol    10) Urinary Retention  - Continue Amezcua's catheter   - Add Flomax  - TOV in 2-3 days     DVT Prophylaxis -- Patient is on Eliquis.    Advance Directives: Full Code.    Dispo: Likely [facility] once stable.    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 Fib on Eliquis, TIA, Seizures, DM 2, DVT, COPD, Fungemia on Fluconazole, MRSA on Bactrim, Right Foot Osteomyelitis and Pelvic Fracture presented to [place] ER from [facility] with fever, tachycardia and vomiting -- found to have aortic dissection so she was transferred to [facility] for evaluation.     1) Sepsis   - Likely due to Pneumonia (Aspiration vs HCAP - Gram Negative Rods vs MRSA) and Pyelonephritis  - Follow cultures  - Continue Meropenem and Vancomycin   - ID Consult appreciated     2) Aortic Dissection   - Likely chronic   - No intervention or repeat imaging as per CTS or Vascular Surgery     3) History of Dysphagia   - Will consult speech therapy for evaluation  - Puree with mildly thick liquids based on previous eval     4) Metabolic Encephalopathy  - Likely due to Sepsis  - Improving     5) History of Fungemia   - Continue Fluconazole   - ID following     6) Adrenal Insufficiency   - Continue stress dose steroids     7) PAF  - Continue Mexiletine and Eliquis     8) DM 2  - HbA1c 8.6 on 12/15/22  - Accu checks and ISS    9) COPD  - Continue Spiriva and Albuterol    10) Urinary Retention  - Continue Amezcua's catheter   - Add Flomax  - TOV in 2-3 days     11) RSV Infection  - Supportive Care     DVT Prophylaxis -- Patient is on Eliquis.    Advance Directives: Full Code.    Dispo: Likely [facility] once stable.

## 2023-01-04 NOTE — SWALLOW BEDSIDE ASSESSMENT ADULT - ASR SWALLOW RECOMMEND DIAG
pt refusing current diet, stating it was advanced upon discharge from [facility] (no objective documentation (repeat MBS/FEES) able to be located in chart/VFSS/MBS

## 2023-01-04 NOTE — SWALLOW BEDSIDE ASSESSMENT ADULT - SLP PERTINENT HISTORY OF CURRENT PROBLEM
pt with h/o dysphagia: multiple objective studies completed in 2022 (FEES & MBS) at [facility]. Most recent MBS 11/16 RX: puree, mildly thick fluids

## 2023-01-04 NOTE — SWALLOW BEDSIDE ASSESSMENT ADULT - COMMENTS
As per MD note: "72 year old female with recurrent hospitalization and complex medical history including Adrenal Insufficiency, Type I Aortic Dissection s/p Bentall Procedure / Hemiarch Replacement on 9/7, Tracheobronchomalacia, Colon Cancer s/p Resection with Colostomy, Paroxysmal A. Fib on Eliquis, TIA, Seizures, DM 2, DVT, COPD, Fungemia on Fluconazole, MRSA on Bactrim, Right Foot Osteomyelitis and Pelvic Fracture presented to [place] [facility] from [facility] with fever, tachycardia and vomiting -- found to have aortic dissection so she was transferred to [facility] for evaluation." Pt declined trials of mildly thick fluids, stating: " I want the regular stuff"  Pt educated [place] [facility] [place], however, with reduced insight due to diet reportedly being advanced post discharge from [facility]

## 2023-01-04 NOTE — SWALLOW BEDSIDE ASSESSMENT ADULT - SLP GENERAL OBSERVATIONS
Pt received & seen seated upright in bed, awake/alert, reduced orientation, confused at times with reduced cognition, NGT, 0/10 pain pre/post

## 2023-01-04 NOTE — PATIENT PROFILE ADULT - FALL HARM RISK - HARM RISK INTERVENTIONS
Assistance with ambulation/Assistance OOB with selected safe patient handling equipment/Communicate Risk of Fall with Harm to all staff/Discuss with provider need for PT consult/Monitor gait and stability/Reinforce activity limits and safety measures with patient and family/Sit up slowly, dangle for a short time, stand at bedside before walking/Tailored Fall Risk Interventions/Visual Cue: Yellow wristband and red socks/Bed in lowest position, wheels locked, appropriate side rails in place/Call bell, personal items and telephone in reach/Instruct patient to call for assistance before getting out of bed or chair/Non-slip footwear when patient is out of bed/[place] to call system/Physically safe environment - no spills, clutter or unnecessary equipment/Purposeful Proactive Rounding/Room/bathroom lighting operational, light cord in reach

## 2023-01-04 NOTE — SWALLOW BEDSIDE ASSESSMENT ADULT - SWALLOW EVAL: MANDIBULAR STRENGTH AND MOBILITY
intact O-Z Flap Text: The defect edges were debeveled with a #15 scalpel blade.  Given the location of the defect, shape of the defect and the proximity to free margins an O-Z flap was deemed most appropriate.  Using a sterile surgical marker, an appropriate transposition flap was drawn incorporating the defect and placing the expected incisions within the relaxed skin tension lines where possible. The area thus outlined was incised deep to adipose tissue with a #15 scalpel blade.  The skin margins were undermined to an appropriate distance in all directions utilizing iris scissors.

## 2023-01-04 NOTE — SWALLOW BEDSIDE ASSESSMENT ADULT - SWALLOW EVAL: DIAGNOSIS
Oral & pharyngeal stage of swallow clinically unremarkable for accepted trials of puree. Pt declined trials of mildly thick fluids stating her diet was adanced PTA (no record of repeat objective eval able to be located upon chart review). Pt is at risk for aspiration for thin fluids given medical hx, therefore, repeat MBS is warranted for further evaluation

## 2023-01-05 ENCOUNTER — NON-APPOINTMENT (OUTPATIENT)
Age: 73
End: 2023-01-05

## 2023-01-05 LAB
-  AMIKACIN: SIGNIFICANT CHANGE UP
-  AMIKACIN: SIGNIFICANT CHANGE UP
-  AMOXICILLIN/CLAVULANIC ACID: SIGNIFICANT CHANGE UP
-  AMOXICILLIN/CLAVULANIC ACID: SIGNIFICANT CHANGE UP
-  AMPICILLIN/SULBACTAM: SIGNIFICANT CHANGE UP
-  AMPICILLIN/SULBACTAM: SIGNIFICANT CHANGE UP
-  AMPICILLIN: SIGNIFICANT CHANGE UP
-  AMPICILLIN: SIGNIFICANT CHANGE UP
-  AZTREONAM: SIGNIFICANT CHANGE UP
-  AZTREONAM: SIGNIFICANT CHANGE UP
-  CEFAZOLIN: SIGNIFICANT CHANGE UP
-  CEFAZOLIN: SIGNIFICANT CHANGE UP
-  CEFEPIME: SIGNIFICANT CHANGE UP
-  CEFEPIME: SIGNIFICANT CHANGE UP
-  CEFOXITIN: SIGNIFICANT CHANGE UP
-  CEFTRIAXONE: SIGNIFICANT CHANGE UP
-  CEFTRIAXONE: SIGNIFICANT CHANGE UP
-  CEFUROXIME: SIGNIFICANT CHANGE UP
-  CEFUROXIME: SIGNIFICANT CHANGE UP
-  CIPROFLOXACIN: SIGNIFICANT CHANGE UP
-  CIPROFLOXACIN: SIGNIFICANT CHANGE UP
-  ERTAPENEM: SIGNIFICANT CHANGE UP
-  ERTAPENEM: SIGNIFICANT CHANGE UP
-  GENTAMICIN: SIGNIFICANT CHANGE UP
-  GENTAMICIN: SIGNIFICANT CHANGE UP
-  IMIPENEM: SIGNIFICANT CHANGE UP
-  LEVOFLOXACIN: SIGNIFICANT CHANGE UP
-  LEVOFLOXACIN: SIGNIFICANT CHANGE UP
-  MEROPENEM: SIGNIFICANT CHANGE UP
-  MEROPENEM: SIGNIFICANT CHANGE UP
-  NITROFURANTOIN: SIGNIFICANT CHANGE UP
-  NITROFURANTOIN: SIGNIFICANT CHANGE UP
-  PIPERACILLIN/TAZOBACTAM: SIGNIFICANT CHANGE UP
-  PIPERACILLIN/TAZOBACTAM: SIGNIFICANT CHANGE UP
-  STAPHYLOCOCCUS EPIDERMIDIS, METHICILLIN RESISTANT: SIGNIFICANT CHANGE UP
-  TOBRAMYCIN: SIGNIFICANT CHANGE UP
-  TOBRAMYCIN: SIGNIFICANT CHANGE UP
-  TRIMETHOPRIM/SULFAMETHOXAZOLE: SIGNIFICANT CHANGE UP
-  TRIMETHOPRIM/SULFAMETHOXAZOLE: SIGNIFICANT CHANGE UP
ALBUMIN SERPL ELPH-MCNC: 2.8 G/DL — LOW (ref 3.3–5.2)
ALP SERPL-CCNC: 63 U/L — SIGNIFICANT CHANGE UP (ref 40–120)
ALT FLD-CCNC: 19 U/L — SIGNIFICANT CHANGE UP
ANION GAP SERPL CALC-SCNC: 12 MMOL/L — SIGNIFICANT CHANGE UP (ref 5–17)
AST SERPL-CCNC: 28 U/L — SIGNIFICANT CHANGE UP
BASOPHILS # BLD AUTO: 0 K/UL — SIGNIFICANT CHANGE UP (ref 0–0.2)
BASOPHILS NFR BLD AUTO: 0 % — SIGNIFICANT CHANGE UP (ref 0–2)
BILIRUB SERPL-MCNC: 0.2 MG/DL — LOW (ref 0.4–2)
BUN SERPL-MCNC: 12.8 MG/DL — SIGNIFICANT CHANGE UP (ref 8–20)
CALCIUM SERPL-MCNC: 8.1 MG/DL — LOW (ref 8.4–10.5)
CHLORIDE SERPL-SCNC: 105 MMOL/L — SIGNIFICANT CHANGE UP (ref 96–108)
CO2 SERPL-SCNC: 22 MMOL/L — SIGNIFICANT CHANGE UP (ref 22–29)
CREAT SERPL-MCNC: 0.39 MG/DL — LOW (ref 0.5–1.3)
CULTURE RESULTS: SIGNIFICANT CHANGE UP
EGFR: 104 ML/MIN/1.73M2 — SIGNIFICANT CHANGE UP
EOSINOPHIL # BLD AUTO: 0 K/UL — SIGNIFICANT CHANGE UP (ref 0–0.5)
EOSINOPHIL NFR BLD AUTO: 0 % — SIGNIFICANT CHANGE UP (ref 0–6)
GLUCOSE BLDC GLUCOMTR-MCNC: 218 MG/DL — HIGH (ref 70–99)
GLUCOSE BLDC GLUCOMTR-MCNC: 219 MG/DL — HIGH (ref 70–99)
GLUCOSE BLDC GLUCOMTR-MCNC: 288 MG/DL — HIGH (ref 70–99)
GLUCOSE BLDC GLUCOMTR-MCNC: 294 MG/DL — HIGH (ref 70–99)
GLUCOSE SERPL-MCNC: 222 MG/DL — HIGH (ref 70–99)
GRAM STN FLD: SIGNIFICANT CHANGE UP
HCT VFR BLD CALC: 30.6 % — LOW (ref 34.5–45)
HGB BLD-MCNC: 9.2 G/DL — LOW (ref 11.5–15.5)
IMM GRANULOCYTES NFR BLD AUTO: 0.6 % — SIGNIFICANT CHANGE UP (ref 0–0.9)
LYMPHOCYTES # BLD AUTO: 0.51 K/UL — LOW (ref 1–3.3)
LYMPHOCYTES # BLD AUTO: 7.6 % — LOW (ref 13–44)
MAGNESIUM SERPL-MCNC: 1.8 MG/DL — SIGNIFICANT CHANGE UP (ref 1.6–2.6)
MCHC RBC-ENTMCNC: 21.7 PG — LOW (ref 27–34)
MCHC RBC-ENTMCNC: 30.1 GM/DL — LOW (ref 32–36)
MCV RBC AUTO: 72.3 FL — LOW (ref 80–100)
METHOD TYPE: SIGNIFICANT CHANGE UP
MONOCYTES # BLD AUTO: 0.58 K/UL — SIGNIFICANT CHANGE UP (ref 0–0.9)
MONOCYTES NFR BLD AUTO: 8.6 % — SIGNIFICANT CHANGE UP (ref 2–14)
NEUTROPHILS # BLD AUTO: 5.6 K/UL — SIGNIFICANT CHANGE UP (ref 1.8–7.4)
NEUTROPHILS NFR BLD AUTO: 83.2 % — HIGH (ref 43–77)
ORGANISM # SPEC MICROSCOPIC CNT: SIGNIFICANT CHANGE UP
PLATELET # BLD AUTO: 168 K/UL — SIGNIFICANT CHANGE UP (ref 150–400)
POTASSIUM SERPL-MCNC: 3.7 MMOL/L — SIGNIFICANT CHANGE UP (ref 3.5–5.3)
POTASSIUM SERPL-SCNC: 3.7 MMOL/L — SIGNIFICANT CHANGE UP (ref 3.5–5.3)
PROT SERPL-MCNC: 5.3 G/DL — LOW (ref 6.6–8.7)
RBC # BLD: 4.23 M/UL — SIGNIFICANT CHANGE UP (ref 3.8–5.2)
RBC # FLD: 18.2 % — HIGH (ref 10.3–14.5)
SODIUM SERPL-SCNC: 139 MMOL/L — SIGNIFICANT CHANGE UP (ref 135–145)
SPECIMEN SOURCE: SIGNIFICANT CHANGE UP
VANCOMYCIN TROUGH SERPL-MCNC: 6.6 UG/ML — LOW (ref 10–20)
VANCOMYCIN TROUGH SERPL-MCNC: <4 UG/ML — LOW (ref 10–20)
WBC # BLD: 6.73 K/UL — SIGNIFICANT CHANGE UP (ref 3.8–10.5)
WBC # FLD AUTO: 6.73 K/UL — SIGNIFICANT CHANGE UP (ref 3.8–10.5)

## 2023-01-05 PROCEDURE — 99233 SBSQ HOSP IP/OBS HIGH 50: CPT

## 2023-01-05 PROCEDURE — 74230 X-RAY XM SWLNG FUNCJ C+: CPT | Mod: 26

## 2023-01-05 RX ORDER — HYDROCORTISONE 20 MG
25 TABLET ORAL EVERY 8 HOURS
Refills: 0 | Status: DISCONTINUED | OUTPATIENT
Start: 2023-01-05 | End: 2023-01-06

## 2023-01-05 RX ORDER — SENNA PLUS 8.6 MG/1
2 TABLET ORAL AT BEDTIME
Refills: 0 | Status: DISCONTINUED | OUTPATIENT
Start: 2023-01-05 | End: 2023-01-12

## 2023-01-05 RX ORDER — VANCOMYCIN HCL 1 G
750 VIAL (EA) INTRAVENOUS EVERY 12 HOURS
Refills: 0 | Status: DISCONTINUED | OUTPATIENT
Start: 2023-01-05 | End: 2023-01-07

## 2023-01-05 RX ORDER — INSULIN GLARGINE 100 [IU]/ML
20 INJECTION, SOLUTION SUBCUTANEOUS AT BEDTIME
Refills: 0 | Status: DISCONTINUED | OUTPATIENT
Start: 2023-01-05 | End: 2023-01-07

## 2023-01-05 RX ADMIN — Medication 2: at 22:40

## 2023-01-05 RX ADMIN — Medication 3: at 13:12

## 2023-01-05 RX ADMIN — Medication 50 MILLIGRAM(S): at 14:14

## 2023-01-05 RX ADMIN — APIXABAN 5 MILLIGRAM(S): 2.5 TABLET, FILM COATED ORAL at 06:31

## 2023-01-05 RX ADMIN — Medication 2: at 08:50

## 2023-01-05 RX ADMIN — MAGNESIUM OXIDE 400 MG ORAL TABLET 400 MILLIGRAM(S): 241.3 TABLET ORAL at 14:14

## 2023-01-05 RX ADMIN — FLUCONAZOLE 100 MILLIGRAM(S): 150 TABLET ORAL at 09:25

## 2023-01-05 RX ADMIN — MEXILETINE HYDROCHLORIDE 200 MILLIGRAM(S): 150 CAPSULE ORAL at 06:33

## 2023-01-05 RX ADMIN — ALBUTEROL 2 PUFF(S): 90 AEROSOL, METERED ORAL at 21:56

## 2023-01-05 RX ADMIN — TIOTROPIUM BROMIDE 2 PUFF(S): 18 CAPSULE ORAL; RESPIRATORY (INHALATION) at 09:57

## 2023-01-05 RX ADMIN — MEROPENEM 1000 MILLIGRAM(S): 1 INJECTION INTRAVENOUS at 14:09

## 2023-01-05 RX ADMIN — MAGNESIUM OXIDE 400 MG ORAL TABLET 400 MILLIGRAM(S): 241.3 TABLET ORAL at 06:32

## 2023-01-05 RX ADMIN — INSULIN GLARGINE 20 UNIT(S): 100 INJECTION, SOLUTION SUBCUTANEOUS at 22:51

## 2023-01-05 RX ADMIN — Medication 1 APPLICATION(S): at 06:32

## 2023-01-05 RX ADMIN — PANTOPRAZOLE SODIUM 40 MILLIGRAM(S): 20 TABLET, DELAYED RELEASE ORAL at 14:27

## 2023-01-05 RX ADMIN — SODIUM CHLORIDE 70 MILLILITER(S): 9 INJECTION INTRAMUSCULAR; INTRAVENOUS; SUBCUTANEOUS at 22:33

## 2023-01-05 RX ADMIN — APIXABAN 5 MILLIGRAM(S): 2.5 TABLET, FILM COATED ORAL at 18:34

## 2023-01-05 RX ADMIN — POLYETHYLENE GLYCOL 3350 17 GRAM(S): 17 POWDER, FOR SOLUTION ORAL at 06:35

## 2023-01-05 RX ADMIN — ALBUTEROL 2 PUFF(S): 90 AEROSOL, METERED ORAL at 09:56

## 2023-01-05 RX ADMIN — Medication 25 MILLIGRAM(S): at 22:39

## 2023-01-05 RX ADMIN — MEROPENEM 1000 MILLIGRAM(S): 1 INJECTION INTRAVENOUS at 22:39

## 2023-01-05 RX ADMIN — Medication 250 MILLIGRAM(S): at 22:28

## 2023-01-05 RX ADMIN — MEROPENEM 1000 MILLIGRAM(S): 1 INJECTION INTRAVENOUS at 06:33

## 2023-01-05 RX ADMIN — Medication 1 APPLICATION(S): at 18:26

## 2023-01-05 RX ADMIN — Medication 100 MILLIGRAM(S): at 10:49

## 2023-01-05 RX ADMIN — SENNA PLUS 2 TABLET(S): 8.6 TABLET ORAL at 22:42

## 2023-01-05 RX ADMIN — MEXILETINE HYDROCHLORIDE 200 MILLIGRAM(S): 150 CAPSULE ORAL at 14:09

## 2023-01-05 RX ADMIN — MEXILETINE HYDROCHLORIDE 200 MILLIGRAM(S): 150 CAPSULE ORAL at 22:42

## 2023-01-05 RX ADMIN — ALBUTEROL 2 PUFF(S): 90 AEROSOL, METERED ORAL at 04:31

## 2023-01-05 RX ADMIN — Medication 50 MILLIGRAM(S): at 06:35

## 2023-01-05 RX ADMIN — Medication 3: at 18:24

## 2023-01-05 RX ADMIN — LACTULOSE 10 GRAM(S): 10 SOLUTION ORAL at 18:26

## 2023-01-05 RX ADMIN — MAGNESIUM OXIDE 400 MG ORAL TABLET 400 MILLIGRAM(S): 241.3 TABLET ORAL at 22:42

## 2023-01-05 RX ADMIN — TAMSULOSIN HYDROCHLORIDE 0.4 MILLIGRAM(S): 0.4 CAPSULE ORAL at 22:39

## 2023-01-05 NOTE — SWALLOW VFSS/MBS ASSESSMENT ADULT - DIAGNOSTIC IMPRESSIONS
Functional pharyngeal stage of swallow for puree, mildly thick & thin fluids Mild oral dysphagia puree & easy to chew solids with reduced lingual strength & coordination, further impacted by lack of dentition for solids    Functional pharyngeal stage of swallow for puree, mildly thick & thin fluids, no stasis penetration &/or aspiration noted   Mild to moderate pharyngeal dysphagia for easy to chew solids with reduced tongue base retraction, resulting in valleculae stasis, which was cleared with successive swallow. +cough noted post swallow, however, no penetration/aspiration was viewed

## 2023-01-05 NOTE — SWALLOW VFSS/MBS ASSESSMENT ADULT - RECOMMENDED FEEDING/EATING TECHNIQUES
allow for swallow between intakes/alternate food with liquid/maintain upright posture during/after eating for 30 mins/position upright (90 degrees)/provide rest periods between swallows/small sips/bites swallow x2 after each bite/allow for swallow between intakes/alternate food with liquid/maintain upright posture during/after eating for 30 mins/position upright (90 degrees)/provide rest periods between swallows/small sips/bites

## 2023-01-05 NOTE — SWALLOW VFSS/MBS ASSESSMENT ADULT - COMMENTS
As per MD note: "72 year old female with recurrent hospitalization and complex medical history including Adrenal Insufficiency, Type I Aortic Dissection s/p Bentall Procedure / Hemiarch Replacement on 9/7, Tracheobronchomalacia, Colon Cancer s/p Resection with Colostomy, Paroxysmal A. Fib on Eliquis, TIA, Seizures, DM 2, DVT, COPD, Fungemia on Fluconazole, MRSA on Bactrim, Right Foot Osteomyelitis and Pelvic Fracture presented to [place] ER from [facility] with fever, tachycardia and vomiting -- found to have aortic dissection so she was transferred to [facility] for evaluation. "

## 2023-01-05 NOTE — PROGRESS NOTE ADULT - ASSESSMENT
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 Fib on Eliquis, TIA, Seizures, DM 2, DVT, COPD, Fungemia on Fluconazole, MRSA on Bactrim, Right Foot Osteomyelitis and Pelvic Fracture presented to [place] ER from [facility] with fever, tachycardia and vomiting -- found to have aortic dissection so she was transferred to [facility] for evaluation.     1) Sepsis   - Likely due to Pneumonia (Aspiration vs HCAP - Gram Negative Rods vs MRSA) and Pyelonephritis  - Blood cultures with MRSE, follow repeat cultures   - Urine culture from [place] with Klebsiella and Proteus ESBL   - Continue Meropenem and Vancomycin   - ID following     2) Aortic Dissection   - Likely chronic   - No intervention or repeat imaging as per CTS or Vascular Surgery     3) History of Dysphagia   - s/p MBS: soft bite sized solids with thin liquids     4) Metabolic Encephalopathy  - Likely due to Sepsis  - Improved    5) History of Fungemia   - Continue Fluconazole   - ID following     6) Adrenal Insufficiency   - Continue stress dose steroids     7) PAF  - Continue Mexiletine and Eliquis     8) DM 2  - HbA1c 8.6 on 12/15/22  - Accu checks and ISS    9) COPD  - Continue Spiriva and Albuterol    10) Urinary Retention  - Continue Amezcua's catheter   - Added Flomax  - TOV in 48 hours      11) RSV Infection  - Supportive Care     DVT Prophylaxis -- Patient is on Eliquis.    Advance Directives: Full Code.    Dispo: [facility] once stable.    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 Fib on Eliquis, TIA, Seizures, DM 2, DVT, COPD, Fungemia on Fluconazole, MRSA on Bactrim, Right Foot Osteomyelitis and Pelvic Fracture presented to [place] ER from [facility] with fever, tachycardia and vomiting -- found to have aortic dissection so she was transferred to [facility] for evaluation.     1) Sepsis   - Likely due to Pneumonia (Aspiration vs HCAP - Gram Negative Rods vs MRSA) and Pyelonephritis  - Blood cultures with MRSE, follow repeat cultures   - Urine culture from [place] with Klebsiella and Proteus ESBL   - Continue Meropenem and Vancomycin   - ID following     2) Aortic Dissection   - Likely chronic   - No intervention or repeat imaging as per CTS or Vascular Surgery     3) History of Dysphagia   - s/p MBS: soft bite sized solids with thin liquids     4) Metabolic Encephalopathy  - Likely due to Sepsis  - Improved    5) History of Fungemia   - Continue Fluconazole   - ID following     6) Adrenal Insufficiency   - Continue stress dose steroids (decrease Solu-Cortef to 25 mg q 8 hours)    7) PAF  - Continue Mexiletine and Eliquis     8) DM 2  - HbA1c 8.6 on 12/15/22  - Accu checks and ISS  - Add Lantus 20 units at bedtime     9) COPD  - Continue Spiriva and Albuterol    10) Urinary Retention  - Continue Amezcua's catheter   - Added Flomax  - TOV in 48 hours      11) RSV Infection  - Supportive Care     DVT Prophylaxis -- Patient is on Eliquis.    Advance Directives: Full Code.    Dispo: [facility] once stable.

## 2023-01-05 NOTE — SWALLOW VFSS/MBS ASSESSMENT ADULT - ADDITIONAL RECOMMENDATIONS
Will follow to check PO tolerance as schedule permits Diet may be advanced to easy to chew solids, upon pt request   Will follow to check PO tolerance as schedule permits

## 2023-01-05 NOTE — SWALLOW VFSS/MBS ASSESSMENT ADULT - SLP PERTINENT HISTORY OF CURRENT PROBLEM
pt with h/o dysphagia: multiple objective studies completed in 2022 (FEES & MBS) at [facility]. Most recent MBS done November 2022 in which a puree diet & mildly thick fluids was RX. Pt stating her diet was advanced upon discharge from [facility] to regular & thin fluids (unable to locate documentation)

## 2023-01-05 NOTE — SWALLOW VFSS/MBS ASSESSMENT ADULT - ORAL PHASE
varied/Uncontrolled bolus / spillover in dorothea-pharynx Uncontrolled bolus / spillover in dorothea-pharynx within functional limits Delayed oral transit time/Reduced anterior - posterior transport/Uncontrolled bolus / spillover in dorothea-pharynx/Uncontrolled bolus / spillover in hypopharynx varied (with consecutive cup sips)/Uncontrolled bolus / spillover in dorothea-pharynx

## 2023-01-06 LAB
-  AMPICILLIN/SULBACTAM: SIGNIFICANT CHANGE UP
-  CEFAZOLIN: SIGNIFICANT CHANGE UP
-  CLINDAMYCIN: SIGNIFICANT CHANGE UP
-  ERYTHROMYCIN: SIGNIFICANT CHANGE UP
-  GENTAMICIN: SIGNIFICANT CHANGE UP
-  OXACILLIN: SIGNIFICANT CHANGE UP
-  PENICILLIN: SIGNIFICANT CHANGE UP
-  RIFAMPIN: SIGNIFICANT CHANGE UP
-  TETRACYCLINE: SIGNIFICANT CHANGE UP
-  TRIMETHOPRIM/SULFAMETHOXAZOLE: SIGNIFICANT CHANGE UP
-  VANCOMYCIN: SIGNIFICANT CHANGE UP
ANION GAP SERPL CALC-SCNC: 10 MMOL/L — SIGNIFICANT CHANGE UP (ref 5–17)
BASOPHILS # BLD AUTO: 0 K/UL — SIGNIFICANT CHANGE UP (ref 0–0.2)
BASOPHILS NFR BLD AUTO: 0 % — SIGNIFICANT CHANGE UP (ref 0–2)
BUN SERPL-MCNC: 9.5 MG/DL — SIGNIFICANT CHANGE UP (ref 8–20)
CALCIUM SERPL-MCNC: 8.4 MG/DL — SIGNIFICANT CHANGE UP (ref 8.4–10.5)
CHLORIDE SERPL-SCNC: 109 MMOL/L — HIGH (ref 96–108)
CO2 SERPL-SCNC: 23 MMOL/L — SIGNIFICANT CHANGE UP (ref 22–29)
CREAT SERPL-MCNC: 0.4 MG/DL — LOW (ref 0.5–1.3)
CULTURE RESULTS: SIGNIFICANT CHANGE UP
EGFR: 104 ML/MIN/1.73M2 — SIGNIFICANT CHANGE UP
EOSINOPHIL # BLD AUTO: 0 K/UL — SIGNIFICANT CHANGE UP (ref 0–0.5)
EOSINOPHIL NFR BLD AUTO: 0 % — SIGNIFICANT CHANGE UP (ref 0–6)
GLUCOSE BLDC GLUCOMTR-MCNC: 120 MG/DL — HIGH (ref 70–99)
GLUCOSE BLDC GLUCOMTR-MCNC: 160 MG/DL — HIGH (ref 70–99)
GLUCOSE BLDC GLUCOMTR-MCNC: 210 MG/DL — HIGH (ref 70–99)
GLUCOSE BLDC GLUCOMTR-MCNC: 297 MG/DL — HIGH (ref 70–99)
GLUCOSE SERPL-MCNC: 104 MG/DL — HIGH (ref 70–99)
HCT VFR BLD CALC: 30 % — LOW (ref 34.5–45)
HGB BLD-MCNC: 9.1 G/DL — LOW (ref 11.5–15.5)
IMM GRANULOCYTES NFR BLD AUTO: 0.6 % — SIGNIFICANT CHANGE UP (ref 0–0.9)
LYMPHOCYTES # BLD AUTO: 0.72 K/UL — LOW (ref 1–3.3)
LYMPHOCYTES # BLD AUTO: 10.9 % — LOW (ref 13–44)
MAGNESIUM SERPL-MCNC: 1.8 MG/DL — SIGNIFICANT CHANGE UP (ref 1.6–2.6)
MCHC RBC-ENTMCNC: 22 PG — LOW (ref 27–34)
MCHC RBC-ENTMCNC: 30.3 GM/DL — LOW (ref 32–36)
MCV RBC AUTO: 72.6 FL — LOW (ref 80–100)
METHOD TYPE: SIGNIFICANT CHANGE UP
MONOCYTES # BLD AUTO: 0.55 K/UL — SIGNIFICANT CHANGE UP (ref 0–0.9)
MONOCYTES NFR BLD AUTO: 8.3 % — SIGNIFICANT CHANGE UP (ref 2–14)
NEUTROPHILS # BLD AUTO: 5.29 K/UL — SIGNIFICANT CHANGE UP (ref 1.8–7.4)
NEUTROPHILS NFR BLD AUTO: 80.2 % — HIGH (ref 43–77)
ORGANISM # SPEC MICROSCOPIC CNT: SIGNIFICANT CHANGE UP
ORGANISM # SPEC MICROSCOPIC CNT: SIGNIFICANT CHANGE UP
PLATELET # BLD AUTO: 175 K/UL — SIGNIFICANT CHANGE UP (ref 150–400)
POTASSIUM SERPL-MCNC: 3.9 MMOL/L — SIGNIFICANT CHANGE UP (ref 3.5–5.3)
POTASSIUM SERPL-SCNC: 3.9 MMOL/L — SIGNIFICANT CHANGE UP (ref 3.5–5.3)
RBC # BLD: 4.13 M/UL — SIGNIFICANT CHANGE UP (ref 3.8–5.2)
RBC # FLD: 17.9 % — HIGH (ref 10.3–14.5)
SODIUM SERPL-SCNC: 142 MMOL/L — SIGNIFICANT CHANGE UP (ref 135–145)
SPECIMEN SOURCE: SIGNIFICANT CHANGE UP
WBC # BLD: 6.6 K/UL — SIGNIFICANT CHANGE UP (ref 3.8–10.5)
WBC # FLD AUTO: 6.6 K/UL — SIGNIFICANT CHANGE UP (ref 3.8–10.5)

## 2023-01-06 PROCEDURE — 99233 SBSQ HOSP IP/OBS HIGH 50: CPT

## 2023-01-06 RX ORDER — HYDROCORTISONE 20 MG
25 TABLET ORAL EVERY 12 HOURS
Refills: 0 | Status: DISCONTINUED | OUTPATIENT
Start: 2023-01-07 | End: 2023-01-08

## 2023-01-06 RX ORDER — IPRATROPIUM/ALBUTEROL SULFATE 18-103MCG
3 AEROSOL WITH ADAPTER (GRAM) INHALATION EVERY 6 HOURS
Refills: 0 | Status: DISCONTINUED | OUTPATIENT
Start: 2023-01-06 | End: 2023-01-12

## 2023-01-06 RX ADMIN — APIXABAN 5 MILLIGRAM(S): 2.5 TABLET, FILM COATED ORAL at 16:58

## 2023-01-06 RX ADMIN — MAGNESIUM OXIDE 400 MG ORAL TABLET 400 MILLIGRAM(S): 241.3 TABLET ORAL at 23:00

## 2023-01-06 RX ADMIN — MEXILETINE HYDROCHLORIDE 200 MILLIGRAM(S): 150 CAPSULE ORAL at 05:50

## 2023-01-06 RX ADMIN — MEROPENEM 1000 MILLIGRAM(S): 1 INJECTION INTRAVENOUS at 05:51

## 2023-01-06 RX ADMIN — Medication 650 MILLIGRAM(S): at 10:30

## 2023-01-06 RX ADMIN — Medication 25 MILLIGRAM(S): at 05:51

## 2023-01-06 RX ADMIN — Medication 1: at 09:40

## 2023-01-06 RX ADMIN — Medication 3 MILLILITER(S): at 21:05

## 2023-01-06 RX ADMIN — POLYETHYLENE GLYCOL 3350 17 GRAM(S): 17 POWDER, FOR SOLUTION ORAL at 05:52

## 2023-01-06 RX ADMIN — Medication 1 APPLICATION(S): at 05:52

## 2023-01-06 RX ADMIN — Medication 3: at 22:57

## 2023-01-06 RX ADMIN — Medication 25 MILLIGRAM(S): at 16:56

## 2023-01-06 RX ADMIN — MAGNESIUM OXIDE 400 MG ORAL TABLET 400 MILLIGRAM(S): 241.3 TABLET ORAL at 05:50

## 2023-01-06 RX ADMIN — Medication 650 MILLIGRAM(S): at 09:45

## 2023-01-06 RX ADMIN — Medication 2: at 17:08

## 2023-01-06 RX ADMIN — APIXABAN 5 MILLIGRAM(S): 2.5 TABLET, FILM COATED ORAL at 05:50

## 2023-01-06 RX ADMIN — Medication 1 APPLICATION(S): at 16:59

## 2023-01-06 RX ADMIN — MEXILETINE HYDROCHLORIDE 200 MILLIGRAM(S): 150 CAPSULE ORAL at 22:57

## 2023-01-06 RX ADMIN — MAGNESIUM OXIDE 400 MG ORAL TABLET 400 MILLIGRAM(S): 241.3 TABLET ORAL at 16:56

## 2023-01-06 RX ADMIN — ALBUTEROL 2 PUFF(S): 90 AEROSOL, METERED ORAL at 11:22

## 2023-01-06 RX ADMIN — MEROPENEM 1000 MILLIGRAM(S): 1 INJECTION INTRAVENOUS at 16:57

## 2023-01-06 RX ADMIN — SODIUM CHLORIDE 70 MILLILITER(S): 9 INJECTION INTRAMUSCULAR; INTRAVENOUS; SUBCUTANEOUS at 23:55

## 2023-01-06 RX ADMIN — MEXILETINE HYDROCHLORIDE 200 MILLIGRAM(S): 150 CAPSULE ORAL at 16:55

## 2023-01-06 RX ADMIN — TAMSULOSIN HYDROCHLORIDE 0.4 MILLIGRAM(S): 0.4 CAPSULE ORAL at 22:51

## 2023-01-06 RX ADMIN — TIOTROPIUM BROMIDE 2 PUFF(S): 18 CAPSULE ORAL; RESPIRATORY (INHALATION) at 10:21

## 2023-01-06 RX ADMIN — Medication 250 MILLIGRAM(S): at 10:14

## 2023-01-06 RX ADMIN — PANTOPRAZOLE SODIUM 40 MILLIGRAM(S): 20 TABLET, DELAYED RELEASE ORAL at 16:58

## 2023-01-06 RX ADMIN — FLUCONAZOLE 100 MILLIGRAM(S): 150 TABLET ORAL at 10:13

## 2023-01-06 RX ADMIN — INSULIN GLARGINE 20 UNIT(S): 100 INJECTION, SOLUTION SUBCUTANEOUS at 22:56

## 2023-01-06 RX ADMIN — MEROPENEM 1000 MILLIGRAM(S): 1 INJECTION INTRAVENOUS at 22:50

## 2023-01-06 RX ADMIN — Medication 250 MILLIGRAM(S): at 22:58

## 2023-01-06 NOTE — PROGRESS NOTE ADULT - ASSESSMENT
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 Fib on Eliquis, TIA, Seizures, DM 2, DVT, COPD, Fungemia on Fluconazole, MRSA on Bactrim, Right Foot Osteomyelitis and Pelvic Fracture presented to [place] ER from [facility] with fever, tachycardia and vomiting -- found to have aortic dissection so she was transferred to [facility] for evaluation.     1) Sepsis   - Likely due to Pneumonia (Aspiration vs HCAP - Gram Negative Rods vs MRSA) and Pyelonephritis  - Blood cultures with MRSE, follow repeat cultures - negative so far   - Urine culture from [place] with Klebsiella and Proteus ESBL   - Continue Meropenem and Vancomycin   - ID following     2) Aortic Dissection   - Likely chronic   - No intervention or repeat imaging as per CTS or Vascular Surgery     3) History of Dysphagia   - s/p MBS: soft bite sized solids with thin liquids     4) Metabolic Encephalopathy  - Likely due to Sepsis  - Improved    5) History of Fungemia   - Continue Fluconazole   - ID following     6) Adrenal Insufficiency   - Continue stress dose steroids (decrease Cortef to 25 mg q 12 hours)    7) PAF  - Continue Mexiletine and Eliquis     8) DM 2  - HbA1c 8.6 on 12/15/22  - Accu checks and ISS  - Add Lantus 20 units at bedtime     9) COPD  - Continue Spiriva   - DuoNebs     10) Urinary Retention  - Start TOV    - Added Flomax    11) RSV Infection  - Supportive Care     DVT Prophylaxis -- Patient is on Eliquis.    Advance Directives: Full Code.    Dispo: [facility]ely [facility] once stable.     Called patient's Thoracic Surgeon per daughter's request however his office was closed. Left a message to call once he is back on Monday.    Updated patient's daughter.    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 Fib on Eliquis, TIA, Seizures, DM 2, DVT, COPD, Fungemia on Fluconazole, MRSA on Bactrim, Right Foot Osteomyelitis and Pelvic Fracture presented to [place] ER from [facility] with fever, tachycardia and vomiting -- found to have aortic dissection so she was transferred to [facility] for evaluation.     1) Sepsis   - Likely due to Pneumonia (Aspiration vs HCAP - Gram Negative Rods vs MRSA) and Pyelonephritis  - Blood cultures with MRSE, follow repeat cultures - negative so far   - Urine culture from [place] with Klebsiella and Proteus ESBL   - Continue Meropenem and Vancomycin   - ID following     2) Aortic Dissection   - Likely chronic   - No intervention or repeat imaging as per CTS or Vascular Surgery     3) History of Dysphagia   - s/p MBS: soft bite sized solids with thin liquids     4) Metabolic Encephalopathy  - Likely due to Sepsis  - Improved    5) History of Fungemia   - Continue Fluconazole   - ID following     6) Adrenal Insufficiency   - Continue stress dose steroids (decrease Cortef to 25 mg q 12 hours)    7) PAF  - Continue Mexiletine and Eliquis     8) DM 2  - HbA1c 8.6 on 12/15/22  - Accu checks and ISS  - Add Lantus 20 units at bedtime     9) COPD  - Continue Spiriva   - DuoNebs     10) Urinary Retention  - Start TOV    - Added Flomax    11) RSV Infection  - Supportive Care     12) Left Heel / Right Elbow / Lower Back Ulcers  - Podiatry / Wound Care Consults appreciated    DVT Prophylaxis -- Patient is on Eliquis.    Advance Directives: Full Code.    Dispo: Likely [facility] once stable.     Called patient's Thoracic Surgeon per daughter's request however his office was closed. Left a message to call once he is back on Monday.    Updated patient's daughter.

## 2023-01-06 NOTE — DIETITIAN NUTRITION RISK NOTIFICATION - TREATMENT: THE FOLLOWING DIET HAS BEEN RECOMMENDED
Diet, Consistent Carbohydrate/No Snacks:   DASH/TLC {Sodium & Cholesterol Restricted} (DASH)  Soft and Bite Sized (SOFTBTSZ)  Supplement Feeding Modality:  Oral  Glucerna Shake Cans or Servings Per Day:  1       Frequency:  Three Times a day (01-05-23 @ 13:02) [Active]

## 2023-01-06 NOTE — DIETITIAN INITIAL EVALUATION ADULT - ADD RECOMMEND
Rx: MVI daily, vitamin C (500mg daily), and zinc sulfate (220mg daily x 10 days) to aid in wound healing.   Provide HBV protein sources  Continue Glucerna shake TID to optimize po intake and provide an additional 220 kcal, 10g protein per serving

## 2023-01-06 NOTE — DIETITIAN INITIAL EVALUATION ADULT - NSICDXPASTSURGICALHX_GEN_ALL_CORE_FT
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    H/O pelvic surgery 5 years ago - s/p fracture    H/O total knee replacement, bilateral 5 years ago    History of partial hysterectomy 30 years ago - fibroids    History of sinus surgery multiple sinus surgeries    History of tracheomalacia 2015 - attempted tracheal stenting ([facility])- course complicated by obstruction, respiratory failure, multiple CPR attempts -  stent discontinued; 10/20/2016 Tracheobronchoplasty (Prolene Mesh) performed at [facility] by Dr Zapien    Rectal bleeding exam under anesthesia ([facility]) 2/2018    S/P bronchoscopy 6/5/2018 - [place] (Dr Zapien) no evidence of tracheobronchomalacia in trachea or bronchial tubes

## 2023-01-06 NOTE — DIETITIAN INITIAL EVALUATION ADULT - PERTINENT LABORATORY DATA
01-06    142  |  109<H>  |  9.5  ----------------------------<  104<H>  3.9   |  23.0  |  0.40<L>    Ca    8.4      06 Jan 2023 05:30  Mg     1.8     01-06    TPro  5.3<L>  /  Alb  2.8<L>  /  TBili  0.2<L>  /  DBili  x   /  AST  28  /  ALT  19  /  AlkPhos  63  01-05  POCT Blood Glucose.: 160 mg/dL (01-06-23 @ 09:33)  A1C with Estimated Average Glucose Result: 8.6 % (12-15-22 @ 06:19)  A1C with Estimated Average Glucose Result: 8.3 % (12-14-22 @ 11:19)  A1C with Estimated Average Glucose Result: 9.4 % (09-06-22 @ 16:46)

## 2023-01-06 NOTE — DIETITIAN INITIAL EVALUATION ADULT - PERTINENT MEDS FT
MEDICATIONS  (STANDING):  albuterol    90 MICROgram(s) HFA Inhaler 2 Puff(s) Inhalation every 6 hours  apixaban 5 milliGRAM(s) Oral every 12 hours  collagenase Ointment 1 Application(s) Topical two times a day  dextrose 5%. 1000 milliLiter(s) (100 mL/Hr) IV Continuous <Continuous>  dextrose 5%. 1000 milliLiter(s) (50 mL/Hr) IV Continuous <Continuous>  dextrose 50% Injectable 25 Gram(s) IV Push once  dextrose 50% Injectable 12.5 Gram(s) IV Push once  dextrose 50% Injectable 25 Gram(s) IV Push once  fluconAZOLE IVPB 400 milliGRAM(s) IV Intermittent every 24 hours  glucagon  Injectable 1 milliGRAM(s) IntraMuscular once  hydrocortisone sodium succinate Injectable 25 milliGRAM(s) IV Push every 8 hours  influenza  Vaccine (HIGH DOSE) 0.7 milliLiter(s) IntraMuscular once  insulin glargine Injectable (LANTUS) 20 Unit(s) SubCutaneous at bedtime  insulin lispro (ADMELOG) corrective regimen sliding scale   SubCutaneous Before meals and at bedtime  magnesium oxide 400 milliGRAM(s) Oral every 8 hours  meropenem Injectable 1000 milliGRAM(s) IV Push every 8 hours  mexiletine 200 milliGRAM(s) Oral every 8 hours  pantoprazole    Tablet 40 milliGRAM(s) Oral daily  polyethylene glycol 3350 17 Gram(s) Oral two times a day  senna 2 Tablet(s) Oral at bedtime  sodium chloride 0.9%. 1000 milliLiter(s) (70 mL/Hr) IV Continuous <Continuous>  tamsulosin 0.4 milliGRAM(s) Oral at bedtime  tiotropium 2.5 MICROgram(s) Inhaler 2 Puff(s) Inhalation daily  vancomycin  IVPB 750 milliGRAM(s) IV Intermittent every 12 hours    MEDICATIONS  (PRN):  acetaminophen     Tablet .. 650 milliGRAM(s) Oral every 6 hours PRN Temp greater or equal to 38C (100.4F), Mild Pain (1 - 3), Moderate Pain (4 - 6)  dextrose Oral Gel 15 Gram(s) Oral once PRN Blood Glucose LESS THAN 70 milliGRAM(s)/deciliter  lactulose Syrup 10 Gram(s) Oral two times a day PRN constipation  ondansetron Injectable 4 milliGRAM(s) IV Push every 6 hours PRN Nausea and/or Vomiting

## 2023-01-06 NOTE — DIETITIAN INITIAL EVALUATION ADULT - NS FNS DIET ORDER
Diet, Consistent Carbohydrate/No Snacks:   DASH/TLC {Sodium & Cholesterol Restricted} (DASH)  Soft and Bite Sized (SOFTBTSZ)  Supplement Feeding Modality:  Oral  Glucerna Shake Cans or Servings Per Day:  1       Frequency:  Three Times a day (01-05-23 @ 13:02)

## 2023-01-06 NOTE — PROGRESS NOTE ADULT - ASSESSMENT
82 year old female   TRANSFER  from [place] for Aortic aneurysm evaluation-y  BIBA from [facility] for worsening SOB since last night [place] -tx to [facility] ,seen by Ct Sx ->and no surgical intervention advised  significant medical hx includes but is not limited to adrenal insufficiency, aortic insufficiency, asthma, Afib on Eliquis, colorectal cancer s/p resection with colostomy bag, COPD, DM, DVT, pelvic fracture, rectal bleeding, seizure, TIA, tracheobronchomalacia, type I aortic dissection s/p Bentall procedure and hemiarch replacement 9/6   patient is unresponsive and hypoxic and hypotensive - intermittent opens eyes   currently lethargic- daughter at bedside -  -    PT HAS HX OF MRSA AND CANDIDEMIA AND HAS BEEN ON CHRONIC SUPPRESSIVE BACTRIM AND DIFLUCAN SINCE  HOSPITALIZATION AT [facility]  BLOOD CX X2 SENT WILL FOLLOWUP   PLACED ON BROAD SPECTRUM ABX AND ANTIFUNGAL  WILL CONTINUE FOR NOW  PT WITH RVP POS RSV  NO TREATMENT FOR THIS     CT SURGERY EVAL NOTED   PT IS MORE AWAKE ALERT   OVERALL IMPROVED WILL FOLLOWUP CX       SUGGEST SPEECH AND SWALLOW EVAL noted  Blood cx mrse may be contaminant await repeat cx    IF NEG CAN D.C VANCO AND RESTART BACTRIM   WILL  FOLLOWUP  WITH FURTHER RECOMMENDATIONS

## 2023-01-06 NOTE — ADVANCED PRACTICE NURSE CONSULT - RECOMMEDATIONS
·	Left Heel - cleanse with NS, pat dry, apply Aquacel to wound bed and cover with Allevyn foam dressing  ·	Coccyx - cleanse with NS, pat dry, apply Aquacel to wound bed and cover with Allevyn foam dressing  ·	Right Elbow - cleanse with NS, pat dry, apply Aquacel to wound bed and cover with Allevyn foam dressing    ·	Turn and Reposition Q2-4H  ·	Offload sacral-coccygeal area with positioner pillow, alternate sides Q2-4H  ·	Incontinence care with repositioning Q2-4H  ·	Apply Heel-Offloading cAIRboots or vertical pillows under each leg at all times while in bed. - provide skin checks and foot placement Q8H      -Continue turning/positioning patient from side-to-side q2h while in bed, q1h when/if OOB chair, or in accordance w/ pt's plan of care. Utilize pillows and/or Spry positioner pillow to assist w/ turning/positioning. When/if OOB chair, utilize pillows or chair cushion to offload pressure.   -Continue to offload heels from bed surface with soft pillow under calfs or by applying offloading boots to BLEs.   -Continue applying Coloplast Neptali Protect moisture barrier cream to buttock and perineal area daily and prn after each incontinent episode.    -Continue utilizing one underpad underneath patient to contain incontinence episodes; change pad when saturated/soiled.   -Consider utilizing female incontinence device/Purewick (if patient candidate) to contain urinary incontinence.  -Consider utilizing external fecal  (if patient candidate) or fecal management system/rectal tube/DigniShield (if patient candidate; MD order required) to contain loose fecal incontinence.   -Continue nutrition consult for optimal wound healing & nutritional status.   -Assess skin/wound qshift, report changes to primary provider.     Plan of Care: Primary RN made aware of above recs. Spoke w/ provider in regards to above. No further needs/recs from [facility] service at this time. Staff RN to perform routine skin/wound assessment and manage wound care. Questions or concerns or if wound worsens and reconsult needed, please contact [facility]

## 2023-01-06 NOTE — DIETITIAN INITIAL EVALUATION ADULT - OTHER INFO
72 year old female with ?dementia - sent from [place] for Aortic aneurysm evaluation-y  BIBA from [facility] for worsening SOB since last night [place] -tx to [facility] ,seen by Ct Sx ->and no surgical intervention advised  significant medical hx includes but is not limited to adrenal insufficiency, aortic insufficiency, asthma, Afib on Eliquis, colorectal cancer s/p resection with colostomy bag, COPD, DM, DVT, pelvic fracture, rectal bleeding, seizure, TIA, tracheobronchomalacia, type I aortic dissection s/p Bentall procedure and hemiarch replacement 9/6 , candemia and MRSA  patient is unresponsive and hypoxic and hypotensive - intermittent opens eyes   currently lethargic- daughter at bedside - states she has never been told patient has dementia but she has been in and out of hospitals and rehabs for over 9 months now.- with decubitus ulcers on legs elbow and back- daughter has pictures- states mom has been not really ambulating and not been improving for several months, but does get better neurologically and can hold a conversation at baseline.

## 2023-01-06 NOTE — DIETITIAN INITIAL EVALUATION ADULT - NSFNSPHYEXAMSKINFT_GEN_A_CORE
Pressure Injury 1: Right:, Stage III  Pressure Injury 2: Left:,heel, none  Pressure Injury 3: Right:,medial,ankle, Stage II  Pressure Injury 4: none, none  Pressure Injury 5: none, none  Pressure Injury 6: none, none  Pressure Injury 7: none, none  Pressure Injury 8: none, none  Pressure Injury 9: none, none  Pressure Injury 10: none, none  Pressure Injury 11: none, none, Pressure Injury 1: Right:, none  Pressure Injury 2: Left:,heel, none  Pressure Injury 3: Right:,medial ankle, none  Pressure Injury 4: none, none  Pressure Injury 5: none, none  Pressure Injury 6: none, none  Pressure Injury 7: none, none  Pressure Injury 8: none, none  Pressure Injury 9: none, none  Pressure Injury 10: none, none  Pressure Injury 11: none, none

## 2023-01-06 NOTE — ADVANCED PRACTICE NURSE CONSULT - ASSESSMENT
Patient laying supine in bed, turned to right (pillow under side), HOB elevated 30 degrees. Pt awake, A&Ox3, made aware of purpose of WOCN visit, agreeable to consult.  Patient with limited mobility in bed. Documented history of Incontinence Associated Dermatitis (IAD), Moisture Associated Dermatitis (MAD), Incontinence of urine and stool. Ordered for CC no snacks diet, current Carlo score of 12. With assistance, patient turned patient to side for skin assessment. Skin assessment as below:    ·	Right Heel Wound - Stage 3 Pressure Injury (1x1cm) red pink wound  bed with macerated edges and white moist slough noted to 10% of wound bed surface with moderate serosanguinous drainage.  Periwound skin pink, boggy blanching intact   ·	Podiatry at bedside for R foot wound - see POD for wound care orders    ·	Right Elbow - Stage 2 Pressure Injury ( x cm) - red/pink moist wound bed with well-defined edges, moderate serosanguinous drainage.  Periwound red/pink with blanching hyperpigmentation (chronic tissue injury), moist, fissuring, skin with evolving blister formation    ·	Coccyx - Healed Fragile prior Pressure Injury with scar tissue present (1x1cm) white moist fragile fissuring scar tissue with irregular edges.  Periwound skin light pink moist shiny intact with fissuring.

## 2023-01-07 LAB
GLUCOSE BLDC GLUCOMTR-MCNC: 167 MG/DL — HIGH (ref 70–99)
GLUCOSE BLDC GLUCOMTR-MCNC: 169 MG/DL — HIGH (ref 70–99)
GLUCOSE BLDC GLUCOMTR-MCNC: 280 MG/DL — HIGH (ref 70–99)
GLUCOSE BLDC GLUCOMTR-MCNC: 312 MG/DL — HIGH (ref 70–99)
HCT VFR BLD CALC: 33.8 % — LOW (ref 34.5–45)
HGB BLD-MCNC: 10.1 G/DL — LOW (ref 11.5–15.5)
MCHC RBC-ENTMCNC: 21.7 PG — LOW (ref 27–34)
MCHC RBC-ENTMCNC: 29.9 GM/DL — LOW (ref 32–36)
MCV RBC AUTO: 72.7 FL — LOW (ref 80–100)
NRBC # BLD: 2 /100 WBCS — HIGH (ref 0–0)
PLATELET # BLD AUTO: 203 K/UL — SIGNIFICANT CHANGE UP (ref 150–400)
RBC # BLD: 4.65 M/UL — SIGNIFICANT CHANGE UP (ref 3.8–5.2)
RBC # FLD: 17.8 % — HIGH (ref 10.3–14.5)
VANCOMYCIN TROUGH SERPL-MCNC: 16.2 UG/ML — SIGNIFICANT CHANGE UP (ref 10–20)
WBC # BLD: 7.28 K/UL — SIGNIFICANT CHANGE UP (ref 3.8–10.5)
WBC # FLD AUTO: 7.28 K/UL — SIGNIFICANT CHANGE UP (ref 3.8–10.5)

## 2023-01-07 PROCEDURE — 99233 SBSQ HOSP IP/OBS HIGH 50: CPT

## 2023-01-07 RX ORDER — INSULIN GLARGINE 100 [IU]/ML
22 INJECTION, SOLUTION SUBCUTANEOUS AT BEDTIME
Refills: 0 | Status: DISCONTINUED | OUTPATIENT
Start: 2023-01-07 | End: 2023-01-08

## 2023-01-07 RX ORDER — INSULIN LISPRO 100/ML
2 VIAL (ML) SUBCUTANEOUS
Refills: 0 | Status: DISCONTINUED | OUTPATIENT
Start: 2023-01-07 | End: 2023-01-12

## 2023-01-07 RX ORDER — ACETYLCYSTEINE 200 MG/ML
4 VIAL (ML) MISCELLANEOUS EVERY 12 HOURS
Refills: 0 | Status: COMPLETED | OUTPATIENT
Start: 2023-01-07 | End: 2023-01-08

## 2023-01-07 RX ADMIN — APIXABAN 5 MILLIGRAM(S): 2.5 TABLET, FILM COATED ORAL at 05:38

## 2023-01-07 RX ADMIN — Medication 650 MILLIGRAM(S): at 15:13

## 2023-01-07 RX ADMIN — TIOTROPIUM BROMIDE 2 PUFF(S): 18 CAPSULE ORAL; RESPIRATORY (INHALATION) at 08:51

## 2023-01-07 RX ADMIN — FLUCONAZOLE 100 MILLIGRAM(S): 150 TABLET ORAL at 07:05

## 2023-01-07 RX ADMIN — Medication 1: at 09:12

## 2023-01-07 RX ADMIN — MEROPENEM 1000 MILLIGRAM(S): 1 INJECTION INTRAVENOUS at 15:11

## 2023-01-07 RX ADMIN — Medication 3: at 11:59

## 2023-01-07 RX ADMIN — Medication 25 MILLIGRAM(S): at 06:32

## 2023-01-07 RX ADMIN — Medication 2 UNIT(S): at 18:20

## 2023-01-07 RX ADMIN — MAGNESIUM OXIDE 400 MG ORAL TABLET 400 MILLIGRAM(S): 241.3 TABLET ORAL at 15:11

## 2023-01-07 RX ADMIN — Medication 4 MILLILITER(S): at 21:18

## 2023-01-07 RX ADMIN — MEXILETINE HYDROCHLORIDE 200 MILLIGRAM(S): 150 CAPSULE ORAL at 22:57

## 2023-01-07 RX ADMIN — Medication 650 MILLIGRAM(S): at 05:37

## 2023-01-07 RX ADMIN — MEROPENEM 1000 MILLIGRAM(S): 1 INJECTION INTRAVENOUS at 05:39

## 2023-01-07 RX ADMIN — TAMSULOSIN HYDROCHLORIDE 0.4 MILLIGRAM(S): 0.4 CAPSULE ORAL at 22:57

## 2023-01-07 RX ADMIN — PANTOPRAZOLE SODIUM 40 MILLIGRAM(S): 20 TABLET, DELAYED RELEASE ORAL at 11:49

## 2023-01-07 RX ADMIN — SENNA PLUS 2 TABLET(S): 8.6 TABLET ORAL at 22:57

## 2023-01-07 RX ADMIN — Medication 1: at 18:21

## 2023-01-07 RX ADMIN — MAGNESIUM OXIDE 400 MG ORAL TABLET 400 MILLIGRAM(S): 241.3 TABLET ORAL at 22:57

## 2023-01-07 RX ADMIN — Medication 1 APPLICATION(S): at 18:23

## 2023-01-07 RX ADMIN — Medication 3 MILLILITER(S): at 15:02

## 2023-01-07 RX ADMIN — MEXILETINE HYDROCHLORIDE 200 MILLIGRAM(S): 150 CAPSULE ORAL at 15:11

## 2023-01-07 RX ADMIN — MAGNESIUM OXIDE 400 MG ORAL TABLET 400 MILLIGRAM(S): 241.3 TABLET ORAL at 05:39

## 2023-01-07 RX ADMIN — APIXABAN 5 MILLIGRAM(S): 2.5 TABLET, FILM COATED ORAL at 18:23

## 2023-01-07 RX ADMIN — MEXILETINE HYDROCHLORIDE 200 MILLIGRAM(S): 150 CAPSULE ORAL at 05:38

## 2023-01-07 RX ADMIN — MEROPENEM 1000 MILLIGRAM(S): 1 INJECTION INTRAVENOUS at 22:57

## 2023-01-07 RX ADMIN — Medication 4: at 22:56

## 2023-01-07 RX ADMIN — Medication 250 MILLIGRAM(S): at 09:31

## 2023-01-07 RX ADMIN — Medication 1 APPLICATION(S): at 05:40

## 2023-01-07 RX ADMIN — INSULIN GLARGINE 22 UNIT(S): 100 INJECTION, SOLUTION SUBCUTANEOUS at 22:56

## 2023-01-07 RX ADMIN — Medication 4 MILLILITER(S): at 15:03

## 2023-01-07 RX ADMIN — Medication 25 MILLIGRAM(S): at 18:22

## 2023-01-07 RX ADMIN — Medication 3 MILLILITER(S): at 21:17

## 2023-01-07 RX ADMIN — Medication 3 MILLILITER(S): at 08:50

## 2023-01-07 NOTE — PHARMACOTHERAPY INTERVENTION NOTE - COMMENTS
Vancomycin level ordered 
Vancomycin level < 4, adjusted dose to 750 mg IV q12h.
updated based on transfer records    star queta list
Vancomycin level ordered 
Vancomycin level ordered

## 2023-01-07 NOTE — PHARMACOTHERAPY INTERVENTION NOTE - NSPHARMCOMMASP
ASP - Dose optimization/Non-Renal dose adjustment
ASP - Lab/ test recommended

## 2023-01-07 NOTE — PROGRESS NOTE ADULT - ASSESSMENT
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 Fib on Eliquis, TIA, Seizures, DM 2, DVT, COPD, Fungemia on Fluconazole, MRSA on Bactrim, Right Foot Osteomyelitis and Pelvic Fracture presented to [place] [facility] from [facility] with fever, tachycardia and vomiting -- found to have aortic dissection so she was transferred to [facility] for evaluation.     1) Sepsis   - Likely due to Pneumonia (Aspiration vs HCAP - Gram Negative Rods vs MRSA) and Pyelonephritis  - Blood cultures with MRSE, follow repeat cultures - negative so far   - Urine culture from [place] with Klebsiella and Proteus ESBL   - Continue Meropenem   - D/C Vancomycin   - ID following     2) Aortic Dissection   - Likely chronic   - No intervention or repeat imaging as per CTS or Vascular Surgery     3) History of Dysphagia   - s/p MBS: soft bite sized solids with thin liquids     4) Metabolic Encephalopathy  - Likely due to Sepsis  - Improved    5) History of Fungemia / MRSA  - On chronic suppressive therapy   - Continue Fluconazole   - Resume Bactrim   - ID following     6) Adrenal Insufficiency   - Continue stress dose steroids (decreased Cortef to 25 mg q 12 hours)    7) PAF  - Continue Mexiletine and Eliquis     8) DM 2  - HbA1c 8.6 on 12/15/22  - Accu checks and ISS  - Increase Lantus to 22 units at bedtime   - Add Premeal Admelog 2 units     9) COPD  - Continue Spiriva   - DuoNebs     10) Urinary Retention  - TOV started on 1/6/23  - Added Flomax    11) RSV Infection  - Supportive Care     12) Left Heel / Right Elbow / Lower Back Ulcers  - Podiatry / Wound Care Consults appreciated    DVT Prophylaxis -- Patient is on Eliquis.    Advance Directives: Full Code.    Dispo: Likely [facility] once stable. PT Eval.     Called patient's Thoracic Surgeon (Dr. Zohaib Zapien [phone]) on 1/6/23 per daughter's request however his office was closed. Left a message to call once he is back on Monday. Per daughter, he has few recommendations regarding patient's underlying tracheobronchomalacia -- may need temporary PEG. Will discuss and will clarify.     Updated patient's daughter.

## 2023-01-08 LAB
CULTURE RESULTS: SIGNIFICANT CHANGE UP
CULTURE RESULTS: SIGNIFICANT CHANGE UP
GLUCOSE BLDC GLUCOMTR-MCNC: 149 MG/DL — HIGH (ref 70–99)
GLUCOSE BLDC GLUCOMTR-MCNC: 199 MG/DL — HIGH (ref 70–99)
GLUCOSE BLDC GLUCOMTR-MCNC: 236 MG/DL — HIGH (ref 70–99)
GLUCOSE BLDC GLUCOMTR-MCNC: 241 MG/DL — HIGH (ref 70–99)
SPECIMEN SOURCE: SIGNIFICANT CHANGE UP
SPECIMEN SOURCE: SIGNIFICANT CHANGE UP

## 2023-01-08 PROCEDURE — 99233 SBSQ HOSP IP/OBS HIGH 50: CPT

## 2023-01-08 RX ORDER — HYDROCORTISONE 20 MG
25 TABLET ORAL DAILY
Refills: 0 | Status: DISCONTINUED | OUTPATIENT
Start: 2023-01-09 | End: 2023-01-12

## 2023-01-08 RX ORDER — INSULIN GLARGINE 100 [IU]/ML
25 INJECTION, SOLUTION SUBCUTANEOUS AT BEDTIME
Refills: 0 | Status: DISCONTINUED | OUTPATIENT
Start: 2023-01-08 | End: 2023-01-09

## 2023-01-08 RX ORDER — COLLAGENASE CLOSTRIDIUM HIST. 250 UNIT/G
1 OINTMENT (GRAM) TOPICAL DAILY
Refills: 0 | Status: DISCONTINUED | OUTPATIENT
Start: 2023-01-08 | End: 2023-01-12

## 2023-01-08 RX ADMIN — MEROPENEM 1000 MILLIGRAM(S): 1 INJECTION INTRAVENOUS at 15:09

## 2023-01-08 RX ADMIN — TIOTROPIUM BROMIDE 2 PUFF(S): 18 CAPSULE ORAL; RESPIRATORY (INHALATION) at 08:35

## 2023-01-08 RX ADMIN — Medication 2 UNIT(S): at 09:26

## 2023-01-08 RX ADMIN — MEROPENEM 1000 MILLIGRAM(S): 1 INJECTION INTRAVENOUS at 05:09

## 2023-01-08 RX ADMIN — MEXILETINE HYDROCHLORIDE 200 MILLIGRAM(S): 150 CAPSULE ORAL at 05:09

## 2023-01-08 RX ADMIN — MAGNESIUM OXIDE 400 MG ORAL TABLET 400 MILLIGRAM(S): 241.3 TABLET ORAL at 05:09

## 2023-01-08 RX ADMIN — Medication 1 APPLICATION(S): at 18:52

## 2023-01-08 RX ADMIN — Medication 1 APPLICATION(S): at 05:06

## 2023-01-08 RX ADMIN — APIXABAN 5 MILLIGRAM(S): 2.5 TABLET, FILM COATED ORAL at 18:51

## 2023-01-08 RX ADMIN — APIXABAN 5 MILLIGRAM(S): 2.5 TABLET, FILM COATED ORAL at 05:09

## 2023-01-08 RX ADMIN — Medication 3 MILLILITER(S): at 14:25

## 2023-01-08 RX ADMIN — Medication 25 MILLIGRAM(S): at 05:09

## 2023-01-08 RX ADMIN — MEXILETINE HYDROCHLORIDE 200 MILLIGRAM(S): 150 CAPSULE ORAL at 22:48

## 2023-01-08 RX ADMIN — Medication 2: at 22:58

## 2023-01-08 RX ADMIN — Medication 3 MILLILITER(S): at 20:56

## 2023-01-08 RX ADMIN — MAGNESIUM OXIDE 400 MG ORAL TABLET 400 MILLIGRAM(S): 241.3 TABLET ORAL at 15:33

## 2023-01-08 RX ADMIN — Medication 2: at 09:26

## 2023-01-08 RX ADMIN — Medication 1 TABLET(S): at 11:53

## 2023-01-08 RX ADMIN — Medication 1 APPLICATION(S): at 11:54

## 2023-01-08 RX ADMIN — PANTOPRAZOLE SODIUM 40 MILLIGRAM(S): 20 TABLET, DELAYED RELEASE ORAL at 11:53

## 2023-01-08 RX ADMIN — Medication 2 UNIT(S): at 11:59

## 2023-01-08 RX ADMIN — Medication 1: at 11:59

## 2023-01-08 RX ADMIN — Medication 4 MILLILITER(S): at 20:57

## 2023-01-08 RX ADMIN — TAMSULOSIN HYDROCHLORIDE 0.4 MILLIGRAM(S): 0.4 CAPSULE ORAL at 22:47

## 2023-01-08 RX ADMIN — Medication 2 UNIT(S): at 18:51

## 2023-01-08 RX ADMIN — MEXILETINE HYDROCHLORIDE 200 MILLIGRAM(S): 150 CAPSULE ORAL at 15:09

## 2023-01-08 RX ADMIN — Medication 2: at 22:30

## 2023-01-08 RX ADMIN — MAGNESIUM OXIDE 400 MG ORAL TABLET 400 MILLIGRAM(S): 241.3 TABLET ORAL at 22:49

## 2023-01-08 RX ADMIN — MEROPENEM 1000 MILLIGRAM(S): 1 INJECTION INTRAVENOUS at 22:48

## 2023-01-08 RX ADMIN — INSULIN GLARGINE 25 UNIT(S): 100 INJECTION, SOLUTION SUBCUTANEOUS at 22:50

## 2023-01-08 RX ADMIN — Medication 4 MILLILITER(S): at 08:45

## 2023-01-08 RX ADMIN — FLUCONAZOLE 100 MILLIGRAM(S): 150 TABLET ORAL at 11:53

## 2023-01-08 RX ADMIN — Medication 3 MILLILITER(S): at 08:35

## 2023-01-08 NOTE — PROGRESS NOTE ADULT - ASSESSMENT
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 Fib on Eliquis, TIA, Seizures, DM 2, DVT, COPD, Fungemia on Fluconazole, MRSA on Bactrim, Right Foot Osteomyelitis and Pelvic Fracture presented to [place] ER from [facility] with fever, tachycardia and vomiting -- found to have aortic dissection so she was transferred to [facility] for evaluation.     1) Sepsis   - Likely due to Pneumonia (Aspiration vs HCAP - Gram Negative Rods) and Pyelonephritis  - Blood cultures with MRSE, follow repeat cultures - negative so far   - Urine culture from [place] with Klebsiella and Proteus ESBL   - Continue Meropenem   - D/C Vancomycin   - ID following   - Sepsis resolved     2) Aortic Dissection   - Likely chronic   - No intervention or repeat imaging as per CTS or Vascular Surgery     3) History of Dysphagia   - s/p MBS: soft bite sized solids with thin liquids     4) Metabolic Encephalopathy  - Likely due to Sepsis  - Improved    5) History of Fungemia / MRSA  - On chronic suppressive therapy   - Continue Fluconazole   - Resumed Bactrim   - ID following     6) Adrenal Insufficiency   - Continue stress dose steroids (decreased Cortef to 25 mg q 24 hours)    7) PAF  - Continue Mexiletine and Eliquis     8) DM 2  - HbA1c 8.6 on 12/15/22  - Accu checks and ISS  - Increase Lantus to 25 units at bedtime   - Continue Premeal Admelog 2 units     9) COPD  - Continue Spiriva   - DuoNebs     10) Urinary Retention  - Successful TOV on 1/6/23  - Added Flomax    11) RSV Infection  - Supportive Care     12) Left Heel / Right Elbow / Lower Back Ulcers  - Podiatry / Wound Care Consults appreciated  - Wound care     DVT Prophylaxis -- Patient is on Eliquis.    Advance Directives: Full Code.    Dispo: Likely [facility] once stable. PT Yocastaal.     Called patient's Thoracic Surgeon (Dr. Zohaib Zapien [phone]) on 1/6/23 per daughter's request however his office was closed. Left a message to call once he is back on Monday. Per daughter, he has few recommendations regarding patient's underlying tracheobronchomalacia -- may need temporary PEG. Will discuss and will clarify.     Updated patient's daughter at bedside.

## 2023-01-08 NOTE — PROGRESS NOTE ADULT - NS ATTEST RISK PROBLEM GEN_ALL_CORE FT
Bacteremia.
Multiple comorbidities. Needs close monitoring.
Multiple comorbidities. Needs close monitoring.
Sepsis.  Bacteremia.
Sepsis.

## 2023-01-09 LAB
ANION GAP SERPL CALC-SCNC: 7 MMOL/L — SIGNIFICANT CHANGE UP (ref 5–17)
ANISOCYTOSIS BLD QL: SIGNIFICANT CHANGE UP
BASOPHILS # BLD AUTO: 0 K/UL — SIGNIFICANT CHANGE UP (ref 0–0.2)
BASOPHILS NFR BLD AUTO: 0 % — SIGNIFICANT CHANGE UP (ref 0–2)
BUN SERPL-MCNC: 11 MG/DL — SIGNIFICANT CHANGE UP (ref 8–20)
CALCIUM SERPL-MCNC: 8.5 MG/DL — SIGNIFICANT CHANGE UP (ref 8.4–10.5)
CHLORIDE SERPL-SCNC: 102 MMOL/L — SIGNIFICANT CHANGE UP (ref 96–108)
CO2 SERPL-SCNC: 33 MMOL/L — HIGH (ref 22–29)
CREAT SERPL-MCNC: 0.44 MG/DL — LOW (ref 0.5–1.3)
DACRYOCYTES BLD QL SMEAR: SLIGHT — SIGNIFICANT CHANGE UP
EGFR: 101 ML/MIN/1.73M2 — SIGNIFICANT CHANGE UP
EOSINOPHIL # BLD AUTO: 0.07 K/UL — SIGNIFICANT CHANGE UP (ref 0–0.5)
EOSINOPHIL NFR BLD AUTO: 0.9 % — SIGNIFICANT CHANGE UP (ref 0–6)
GIANT PLATELETS BLD QL SMEAR: PRESENT — SIGNIFICANT CHANGE UP
GLUCOSE BLDC GLUCOMTR-MCNC: 114 MG/DL — HIGH (ref 70–99)
GLUCOSE BLDC GLUCOMTR-MCNC: 212 MG/DL — HIGH (ref 70–99)
GLUCOSE BLDC GLUCOMTR-MCNC: 268 MG/DL — HIGH (ref 70–99)
GLUCOSE BLDC GLUCOMTR-MCNC: 55 MG/DL — LOW (ref 70–99)
GLUCOSE BLDC GLUCOMTR-MCNC: 69 MG/DL — LOW (ref 70–99)
GLUCOSE SERPL-MCNC: 45 MG/DL — CRITICAL LOW (ref 70–99)
HCT VFR BLD CALC: 34.6 % — SIGNIFICANT CHANGE UP (ref 34.5–45)
HGB BLD-MCNC: 10.4 G/DL — LOW (ref 11.5–15.5)
LYMPHOCYTES # BLD AUTO: 0.47 K/UL — LOW (ref 1–3.3)
LYMPHOCYTES # BLD AUTO: 6.3 % — LOW (ref 13–44)
MACROCYTES BLD QL: SLIGHT — SIGNIFICANT CHANGE UP
MAGNESIUM SERPL-MCNC: 1.8 MG/DL — SIGNIFICANT CHANGE UP (ref 1.6–2.6)
MANUAL SMEAR VERIFICATION: SIGNIFICANT CHANGE UP
MCHC RBC-ENTMCNC: 21.4 PG — LOW (ref 27–34)
MCHC RBC-ENTMCNC: 30.1 GM/DL — LOW (ref 32–36)
MCV RBC AUTO: 71.3 FL — LOW (ref 80–100)
METAMYELOCYTES # FLD: 0.9 % — HIGH (ref 0–0)
MICROCYTES BLD QL: SIGNIFICANT CHANGE UP
MONOCYTES # BLD AUTO: 1.14 K/UL — HIGH (ref 0–0.9)
MONOCYTES NFR BLD AUTO: 15.3 % — HIGH (ref 2–14)
MYELOCYTES NFR BLD: 7.2 % — HIGH (ref 0–0)
NEUTROPHILS # BLD AUTO: 4.72 K/UL — SIGNIFICANT CHANGE UP (ref 1.8–7.4)
NEUTROPHILS NFR BLD AUTO: 62.2 % — SIGNIFICANT CHANGE UP (ref 43–77)
NEUTS BAND # BLD: 0.9 % — SIGNIFICANT CHANGE UP (ref 0–8)
NRBC # BLD: 11 /100 — HIGH (ref 0–0)
OVALOCYTES BLD QL SMEAR: SLIGHT — SIGNIFICANT CHANGE UP
PLAT MORPH BLD: NORMAL — SIGNIFICANT CHANGE UP
PLATELET # BLD AUTO: 208 K/UL — SIGNIFICANT CHANGE UP (ref 150–400)
POIKILOCYTOSIS BLD QL AUTO: SLIGHT — SIGNIFICANT CHANGE UP
POLYCHROMASIA BLD QL SMEAR: SLIGHT — SIGNIFICANT CHANGE UP
POTASSIUM SERPL-MCNC: 2.9 MMOL/L — CRITICAL LOW (ref 3.5–5.3)
POTASSIUM SERPL-SCNC: 2.9 MMOL/L — CRITICAL LOW (ref 3.5–5.3)
RBC # BLD: 4.85 M/UL — SIGNIFICANT CHANGE UP (ref 3.8–5.2)
RBC # FLD: 18.6 % — HIGH (ref 10.3–14.5)
RBC BLD AUTO: ABNORMAL
SCHISTOCYTES BLD QL AUTO: SLIGHT — SIGNIFICANT CHANGE UP
SMUDGE CELLS # BLD: PRESENT — SIGNIFICANT CHANGE UP
SODIUM SERPL-SCNC: 142 MMOL/L — SIGNIFICANT CHANGE UP (ref 135–145)
TARGETS BLD QL SMEAR: SLIGHT — SIGNIFICANT CHANGE UP
VARIANT LYMPHS # BLD: 6.3 % — HIGH (ref 0–6)
WBC # BLD: 7.48 K/UL — SIGNIFICANT CHANGE UP (ref 3.8–10.5)
WBC # FLD AUTO: 7.48 K/UL — SIGNIFICANT CHANGE UP (ref 3.8–10.5)

## 2023-01-09 PROCEDURE — 99232 SBSQ HOSP IP/OBS MODERATE 35: CPT

## 2023-01-09 RX ORDER — INSULIN GLARGINE 100 [IU]/ML
23 INJECTION, SOLUTION SUBCUTANEOUS AT BEDTIME
Refills: 0 | Status: DISCONTINUED | OUTPATIENT
Start: 2023-01-09 | End: 2023-01-12

## 2023-01-09 RX ORDER — DEXTROSE 50 % IN WATER 50 %
25 SYRINGE (ML) INTRAVENOUS ONCE
Refills: 0 | Status: COMPLETED | OUTPATIENT
Start: 2023-01-09 | End: 2023-01-09

## 2023-01-09 RX ORDER — POTASSIUM CHLORIDE 20 MEQ
40 PACKET (EA) ORAL ONCE
Refills: 0 | Status: COMPLETED | OUTPATIENT
Start: 2023-01-09 | End: 2023-01-09

## 2023-01-09 RX ORDER — POTASSIUM CHLORIDE 20 MEQ
10 PACKET (EA) ORAL
Refills: 0 | Status: COMPLETED | OUTPATIENT
Start: 2023-01-09 | End: 2023-01-09

## 2023-01-09 RX ORDER — DEXTROSE 50 % IN WATER 50 %
12.5 SYRINGE (ML) INTRAVENOUS ONCE
Refills: 0 | Status: COMPLETED | OUTPATIENT
Start: 2023-01-09 | End: 2023-01-09

## 2023-01-09 RX ADMIN — MEXILETINE HYDROCHLORIDE 200 MILLIGRAM(S): 150 CAPSULE ORAL at 06:25

## 2023-01-09 RX ADMIN — Medication 3: at 16:30

## 2023-01-09 RX ADMIN — POLYETHYLENE GLYCOL 3350 17 GRAM(S): 17 POWDER, FOR SOLUTION ORAL at 06:26

## 2023-01-09 RX ADMIN — APIXABAN 5 MILLIGRAM(S): 2.5 TABLET, FILM COATED ORAL at 06:25

## 2023-01-09 RX ADMIN — APIXABAN 5 MILLIGRAM(S): 2.5 TABLET, FILM COATED ORAL at 19:00

## 2023-01-09 RX ADMIN — Medication 2 UNIT(S): at 16:30

## 2023-01-09 RX ADMIN — PANTOPRAZOLE SODIUM 40 MILLIGRAM(S): 20 TABLET, DELAYED RELEASE ORAL at 11:50

## 2023-01-09 RX ADMIN — Medication 12.5 GRAM(S): at 09:15

## 2023-01-09 RX ADMIN — MEXILETINE HYDROCHLORIDE 200 MILLIGRAM(S): 150 CAPSULE ORAL at 16:29

## 2023-01-09 RX ADMIN — Medication 20 MILLIEQUIVALENT(S): at 11:46

## 2023-01-09 RX ADMIN — Medication 3 MILLILITER(S): at 20:05

## 2023-01-09 RX ADMIN — TAMSULOSIN HYDROCHLORIDE 0.4 MILLIGRAM(S): 0.4 CAPSULE ORAL at 22:32

## 2023-01-09 RX ADMIN — Medication 1 APPLICATION(S): at 19:00

## 2023-01-09 RX ADMIN — Medication 3 MILLILITER(S): at 09:40

## 2023-01-09 RX ADMIN — FLUCONAZOLE 100 MILLIGRAM(S): 150 TABLET ORAL at 08:56

## 2023-01-09 RX ADMIN — MAGNESIUM OXIDE 400 MG ORAL TABLET 400 MILLIGRAM(S): 241.3 TABLET ORAL at 06:25

## 2023-01-09 RX ADMIN — Medication 3 MILLILITER(S): at 15:27

## 2023-01-09 RX ADMIN — Medication 25 MILLIGRAM(S): at 06:25

## 2023-01-09 RX ADMIN — MAGNESIUM OXIDE 400 MG ORAL TABLET 400 MILLIGRAM(S): 241.3 TABLET ORAL at 17:02

## 2023-01-09 RX ADMIN — Medication 100 MILLIEQUIVALENT(S): at 11:48

## 2023-01-09 RX ADMIN — Medication 100 MILLIEQUIVALENT(S): at 18:57

## 2023-01-09 RX ADMIN — INSULIN GLARGINE 23 UNIT(S): 100 INJECTION, SOLUTION SUBCUTANEOUS at 22:43

## 2023-01-09 RX ADMIN — MEROPENEM 1000 MILLIGRAM(S): 1 INJECTION INTRAVENOUS at 16:30

## 2023-01-09 RX ADMIN — MAGNESIUM OXIDE 400 MG ORAL TABLET 400 MILLIGRAM(S): 241.3 TABLET ORAL at 22:32

## 2023-01-09 RX ADMIN — MEXILETINE HYDROCHLORIDE 200 MILLIGRAM(S): 150 CAPSULE ORAL at 22:31

## 2023-01-09 RX ADMIN — MEROPENEM 1000 MILLIGRAM(S): 1 INJECTION INTRAVENOUS at 06:26

## 2023-01-09 RX ADMIN — TIOTROPIUM BROMIDE 2 PUFF(S): 18 CAPSULE ORAL; RESPIRATORY (INHALATION) at 09:41

## 2023-01-09 RX ADMIN — Medication 1 APPLICATION(S): at 11:49

## 2023-01-09 RX ADMIN — MEROPENEM 1000 MILLIGRAM(S): 1 INJECTION INTRAVENOUS at 22:32

## 2023-01-09 RX ADMIN — Medication 1 TABLET(S): at 11:50

## 2023-01-09 RX ADMIN — Medication 1 APPLICATION(S): at 06:26

## 2023-01-09 RX ADMIN — Medication 100 MILLIEQUIVALENT(S): at 16:31

## 2023-01-09 RX ADMIN — SENNA PLUS 2 TABLET(S): 8.6 TABLET ORAL at 22:31

## 2023-01-09 NOTE — PROGRESS NOTE ADULT - ASSESSMENT
82 year old female   TRANSFER  from [place] for Aortic aneurysm evaluation-y  BIBA from [facility] for worsening SOB since last night [place] -tx to [facility] ,seen by Ct Sx ->and no surgical intervention advised  significant medical hx includes but is not limited to adrenal insufficiency, aortic insufficiency, asthma, Afib on Eliquis, colorectal cancer s/p resection with colostomy bag, COPD, DM, DVT, pelvic fracture, rectal bleeding, seizure, TIA, tracheobronchomalacia, type I aortic dissection s/p Bentall procedure and hemiarch replacement 9/6   patient is unresponsive and hypoxic and hypotensive - intermittent opens eyes   currently lethargic- daughter at bedside -  -    PT HAS HX OF MRSA AND CANDIDEMIA AND HAS BEEN ON CHRONIC SUPPRESSIVE BACTRIM AND DIFLUCAN SINCE  HOSPITALIZATION AT [facility]  BLOOD CX X2 SENT WILL FOLLOWUP   PLACED ON BROAD SPECTRUM ABX AND ANTIFUNGAL     PT WITH RVP POS RSV  NO TREATMENT FOR THIS   CT SURGERY EVAL NOTED   PT IS MORE AWAKE ALERT   OVERALL IMPROVED WILL FOLLOWUP CX         SPEECH AND SWALLOW EVAL noted  Blood cx mrse may be contaminant   repeat cx NEGATIVE    OVEALL IMPROVED AWAITING POSSIBEL PEG  WOULD CONTINUE IF   MERREM TO COMPLETE 7 DAYS THROUGH  1/10/23  CONITNUE  DIFLUCAN BACTRIM SUPPRESSIVE REGIMEN AS  SHE WAS ON PRIOR TO HOSP STAY   WILL FOLLOW UP AS NEEDED PLEASE CALL IF QUESTIONS

## 2023-01-09 NOTE — PROGRESS NOTE ADULT - ASSESSMENT
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 Fib on Eliquis, TIA, Seizures, DM 2, DVT, COPD, Fungemia on Fluconazole, MRSA on Bactrim, Right Foot Osteomyelitis and Pelvic Fracture presented to [place] ER from [facility] with fever, tachycardia and vomiting -- found to have aortic dissection so she was transferred to [facility] for evaluation.     *Sepsis   Likely due to Pneumonia (Aspiration vs HCAP - Gram Negative Rods) and Pyelonephritis  Blood cultures with MRSE, follow repeat cultures - negative so far   Urine culture from [place] with Klebsiella and Proteus ESBL   Continue Meropenem, last dose tomorrow   D/C Vancomycin   ID following   Sepsis resolved     *Aortic Dissection   Likely chronic   No intervention or repeat imaging as per CTS or Vascular Surgery     *History of Dysphagia   s/p MBS: soft bite sized solids with thin liquids     *Metabolic Encephalopathy  resolved     *History of Fungemia / MRSA  On chronic suppressive therapy   Continue Fluconazole   Resumed Bactrim   ID following     *Adrenal Insufficiency   Continue stress dose steroids (decreased Cortef to 25 mg q 24 hours)    *PAF  Continue Mexiletine and Eliquis     *DM 2  HbA1c 8.6 on 12/15/22  Accu checks and ISS  Increase Lantus to 25 units at bedtime   Continue Premeal Admelog 2 units     *COPD  Continue Spiriva   DuoNebs     *Urinary Retention  Successful TOV on 1/6/23  Added Flomax    *RSV Infection  Supportive Care     *Left Heel / Right Elbow / Lower Back Ulcers  Podiatry / Wound Care Consults appreciated  Wound care     DVT Prophylaxis -- Patient is on Eliquis.    Advance Directives: Full Code.    Dispo: Likely [facility] once stable. PT Eval.     Called patient's Thoracic Surgeon (Dr. Zohaib Zapien [phone]) twice today, pending return call. Per daughter, he has few recommendations regarding patient's underlying tracheobronchomalacia -- may need temporary PEG. Will discuss and will clarify.     Updated patient's daughter via phone via [phone] today

## 2023-01-10 ENCOUNTER — TRANSCRIPTION ENCOUNTER (OUTPATIENT)
Age: 73
End: 2023-01-10

## 2023-01-10 LAB
CULTURE RESULTS: SIGNIFICANT CHANGE UP
GLUCOSE BLDC GLUCOMTR-MCNC: 180 MG/DL — HIGH (ref 70–99)
GLUCOSE BLDC GLUCOMTR-MCNC: 183 MG/DL — HIGH (ref 70–99)
GLUCOSE BLDC GLUCOMTR-MCNC: 232 MG/DL — HIGH (ref 70–99)
GLUCOSE BLDC GLUCOMTR-MCNC: 68 MG/DL — LOW (ref 70–99)
ORGANISM # SPEC MICROSCOPIC CNT: SIGNIFICANT CHANGE UP
ORGANISM # SPEC MICROSCOPIC CNT: SIGNIFICANT CHANGE UP
SARS-COV-2 RNA SPEC QL NAA+PROBE: SIGNIFICANT CHANGE UP
SPECIMEN SOURCE: SIGNIFICANT CHANGE UP

## 2023-01-10 PROCEDURE — 99232 SBSQ HOSP IP/OBS MODERATE 35: CPT | Mod: GC

## 2023-01-10 RX ADMIN — MAGNESIUM OXIDE 400 MG ORAL TABLET 400 MILLIGRAM(S): 241.3 TABLET ORAL at 06:21

## 2023-01-10 RX ADMIN — MEROPENEM 1000 MILLIGRAM(S): 1 INJECTION INTRAVENOUS at 23:13

## 2023-01-10 RX ADMIN — Medication 3 MILLILITER(S): at 15:21

## 2023-01-10 RX ADMIN — Medication 2: at 13:20

## 2023-01-10 RX ADMIN — MEXILETINE HYDROCHLORIDE 200 MILLIGRAM(S): 150 CAPSULE ORAL at 23:13

## 2023-01-10 RX ADMIN — Medication 1 APPLICATION(S): at 06:21

## 2023-01-10 RX ADMIN — Medication 25 MILLIGRAM(S): at 06:20

## 2023-01-10 RX ADMIN — APIXABAN 5 MILLIGRAM(S): 2.5 TABLET, FILM COATED ORAL at 18:12

## 2023-01-10 RX ADMIN — Medication 2 UNIT(S): at 13:19

## 2023-01-10 RX ADMIN — INSULIN GLARGINE 23 UNIT(S): 100 INJECTION, SOLUTION SUBCUTANEOUS at 23:12

## 2023-01-10 RX ADMIN — MAGNESIUM OXIDE 400 MG ORAL TABLET 400 MILLIGRAM(S): 241.3 TABLET ORAL at 23:13

## 2023-01-10 RX ADMIN — POLYETHYLENE GLYCOL 3350 17 GRAM(S): 17 POWDER, FOR SOLUTION ORAL at 06:22

## 2023-01-10 RX ADMIN — Medication 2 UNIT(S): at 18:12

## 2023-01-10 RX ADMIN — TAMSULOSIN HYDROCHLORIDE 0.4 MILLIGRAM(S): 0.4 CAPSULE ORAL at 23:12

## 2023-01-10 RX ADMIN — Medication 1: at 18:12

## 2023-01-10 RX ADMIN — PANTOPRAZOLE SODIUM 40 MILLIGRAM(S): 20 TABLET, DELAYED RELEASE ORAL at 13:18

## 2023-01-10 RX ADMIN — Medication 3 MILLILITER(S): at 09:05

## 2023-01-10 RX ADMIN — FLUCONAZOLE 100 MILLIGRAM(S): 150 TABLET ORAL at 08:41

## 2023-01-10 RX ADMIN — Medication 1 APPLICATION(S): at 18:13

## 2023-01-10 RX ADMIN — Medication 1: at 23:11

## 2023-01-10 RX ADMIN — Medication 1 TABLET(S): at 13:22

## 2023-01-10 RX ADMIN — MEXILETINE HYDROCHLORIDE 200 MILLIGRAM(S): 150 CAPSULE ORAL at 14:04

## 2023-01-10 RX ADMIN — SENNA PLUS 2 TABLET(S): 8.6 TABLET ORAL at 23:13

## 2023-01-10 RX ADMIN — APIXABAN 5 MILLIGRAM(S): 2.5 TABLET, FILM COATED ORAL at 06:21

## 2023-01-10 RX ADMIN — MAGNESIUM OXIDE 400 MG ORAL TABLET 400 MILLIGRAM(S): 241.3 TABLET ORAL at 14:04

## 2023-01-10 RX ADMIN — MEXILETINE HYDROCHLORIDE 200 MILLIGRAM(S): 150 CAPSULE ORAL at 06:21

## 2023-01-10 RX ADMIN — Medication 1 APPLICATION(S): at 13:19

## 2023-01-10 RX ADMIN — TIOTROPIUM BROMIDE 2 PUFF(S): 18 CAPSULE ORAL; RESPIRATORY (INHALATION) at 15:21

## 2023-01-10 RX ADMIN — MEROPENEM 1000 MILLIGRAM(S): 1 INJECTION INTRAVENOUS at 06:21

## 2023-01-10 RX ADMIN — MEROPENEM 1000 MILLIGRAM(S): 1 INJECTION INTRAVENOUS at 14:04

## 2023-01-10 NOTE — DISCHARGE NOTE PROVIDER - PROVIDER TOKENS
FREE:[LAST:[Dr. Zapien],PHONE:[(   )    -],FAX:[(   )    -],FOLLOWUP:[Routine],ESTABLISHEDPATIENT:[T]],FREE:[LAST:[Primary Care Provider],PHONE:[(   )    -],FAX:[(   )    -],FOLLOWUP:[1 week]]

## 2023-01-10 NOTE — DISCHARGE NOTE PROVIDER - HOSPITAL COURSE
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 Fib on Eliquis, TIA, Seizures, DM 2, DVT, COPD, Fungemia on Fluconazole, MRSA on Bactrim, Right Foot Osteomyelitis and Pelvic Fracture presented to [place] ER from [facility] with fever, tachycardia and vomiting -- found to have aortic dissection so she was transferred from [facility] to [facility] for evaluation. No intervention or repeat imaging as per CTS or Vascular surgery, dissection likely chronic in nature. Patient found to be septic likely 2/2 Asp vs HCAP and Pyelonephritis. Bacteriemic with MRSE and urine culture + for Klebsiella and Proteus ESBL, RVP + for RSV. Stress dose of steroids given 2/2 adrenal insufficiency. ID consulted, repeat Bcx NGTD, completed Abx course of Meropenem, passed TOV. Hx of dysphagia, underwent MBS recommending soft and bite sized solids with thin liquids. PEG under consideration pending outpatient follow up with CT surgeon Dr. Zapien. Podiatry / Wound Care Consults appreciated for Left Heel / Right Elbow / Lower Back Ulcers. PT eval recommending [facility]. Patient is normoxic on room air and medically sable for DC to [facility] with adherence to all medications as prescribed and outpatient follow up as scheduled/recommended.              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 Fib on Eliquis, TIA, Seizures, DM 2, DVT, COPD, Fungemia on Fluconazole, MRSA on Bactrim, Right Foot Osteomyelitis and Pelvic Fracture presented to [place] ER from [facility] with fever, tachycardia and vomiting -- found to have aortic dissection so she was transferred from [facility] to [facility] for evaluation. No intervention or repeat imaging as per CTS or Vascular surgery, dissection likely chronic in nature. Patient found to be septic likely 2/2 Asp vs HCAP and Pyelonephritis. Bacteriemic with MRSE and urine culture + for Klebsiella and Proteus ESBL, RVP + for RSV. Stress dose of steroids given 2/2 adrenal insufficiency. ID consulted, repeat Bcx NGTD, completed Abx course of Meropenem, passed TOV. Hx of dysphagia, underwent MBS recommending soft and bite sized solids with thin liquids. PEG under consideration pending outpatient follow up with CT surgeon Dr. Zapien. Podiatry / Wound Care Consults appreciated for Left Heel / Right Elbow / Lower Back Ulcers. PT eval recommending [facility].Medically sable for DC to [facility] with adherence to all medications as prescribed and outpatient follow up as scheduled/recommended.         Disposition: Stable for discharge.  Outpatient followup discussed.  Total time spent on discharge is  35  minutes.

## 2023-01-10 NOTE — DISCHARGE NOTE PROVIDER - CARE PROVIDER_API CALL
Dr. Zapien,   Phone: (   )    -  Fax: (   )    -  Established Patient  Follow Up Time: Routine    Primary Care Provider,   Phone: (   )    -  Fax: (   )    -  Follow Up Time: 1 week

## 2023-01-10 NOTE — DISCHARGE NOTE PROVIDER - NSDCMRMEDTOKEN_GEN_ALL_CORE_FT
acetaminophen 500 mg oral tablet: 1 tab(s) orally every 4 hours, As Needed s needed for pain, fever &gt; 101  acetylcysteine 10% inhalation solution: 4 milliliter(s) inhaled every 12 hours  Admelog SoloStar 100 units/mL injectable solution: 5 unit(s) subcutaneously 3 times a day with meals. Hold if not eating  Admelog SoloStar 100 units/mL injectable solution: Inject as per sliding scale:  &lt; 60 = 0 units  201-250 = 2 units  251-300 = 4 units  301-350 = 6 units  351-400 = 8 units  &gt; 400 = 0 units, call MD  apixaban 5 mg oral tablet: 1 tab(s) orally 2 times a day  budesonide 0.5 mg/2 mL inhalation suspension: 2 milliliter(s) inhaled 2 times a day  Crestor 5 mg oral tablet: 1 tab(s) orally once a day (at bedtime)  fluconazole 200 mg oral tablet: 2 tab(s) orally once a day  insulin glargine 100 units/mL subcutaneous solution: 18 unit(s) subcutaneous once a day (at bedtime)  ipratropium-albuterol 0.5 mg-2.5 mg/3 mL inhalation solution: 3 milliliter(s) inhaled every 6 hours  lactulose 10 g/15 mL oral syrup: 15 milliliter(s) orally 2 times a day, As needed, constipation  magnesium oxide 400 mg oral tablet: 1 tab(s) orally every 8 hours  Medrol 8 mg oral tablet: 1 tab(s) orally once a day  metoprolol tartrate 25 mg oral tablet: 0.5 tab(s) orally 2 times a day; Hold for SBP less than 100 and/or heart rate less than 60  mexiletine 200 mg oral capsule: 1 cap(s) orally every 8 hours  MiraLax oral powder for reconstitution: 17 gram(s) orally 2 times a day  Multiple Vitamins oral tablet: 1 tab(s) orally once a day  mupirocin 2% topical ointment: Apply topically to left heel and right ankle every day shift, cleanse with NS, apply tx, dcd and kerlex  omeprazole 20 mg oral delayed release tablet: 1 tab(s) orally once a day  silver sulfADIAZINE 1% topical cream: Apply topically every day and evening shift for wound care, cleanse with NS, apply tx and DCD with kerlex  sodium/potassium/phosphorus 160 mg-280 mg-250 mg oral powder for reconstitution: 1 packet(s) orally 3 times a day (with meals) for 2 days  sulfamethoxazole-trimethoprim 400 mg-80 mg oral tablet: 1 tab(s) orally once a day   apixaban 5 mg oral tablet: 1 tab(s) orally 2 times a day  budesonide 0.5 mg/2 mL inhalation suspension: 2 milliliter(s) inhaled 2 times a day  collagenase 250 units/g topical ointment: 1 application topically 2 times a day  Crestor 5 mg oral tablet: 1 tab(s) orally once a day (at bedtime)  fluconazole 200 mg oral tablet: 2 tab(s) orally once a day  insulin glargine 100 units/mL subcutaneous solution: 23 unit(s) subcutaneous once a day (at bedtime)  insulin lispro 100 units/mL injectable solution: 2 unit(s) subcutaneous 3 times a day (before meals)  ipratropium-albuterol 0.5 mg-2.5 mg/3 mL inhalation solution: 3 milliliter(s) inhaled every 6 hours  lactulose 10 g/15 mL oral syrup: 15 milliliter(s) orally 2 times a day, As needed, constipation  magnesium oxide 400 mg oral tablet: 1 tab(s) orally every 8 hours  mexiletine 200 mg oral capsule: 1 cap(s) orally every 8 hours  MiraLax oral powder for reconstitution: 17 gram(s) orally 2 times a day  omeprazole 20 mg oral delayed release tablet: 1 tab(s) orally once a day  sulfamethoxazole-trimethoprim 400 mg-80 mg oral tablet: 1 tab(s) orally once a day  tamsulosin 0.4 mg oral capsule: 1 cap(s) orally once a day (at bedtime)

## 2023-01-10 NOTE — DISCHARGE NOTE NURSING/CASE MANAGEMENT/SOCIAL WORK - NSDCVIVACCINE_GEN_ALL_CORE_FT
COVID-19, mRNA, LNP-S, PF, 100 mcg/ 0.5 mL dose (Moderna); 06-Dec-2021 17:12; Afshan Lew (RADHA); [employer] [id number] (Exp. Date: 22-Dec-2021); IntraMuscular; Deltoid Left.; 0.25 milliLiter(s);

## 2023-01-10 NOTE — DISCHARGE NOTE NURSING/CASE MANAGEMENT/SOCIAL WORK - NSDCPEFALRISK_GEN_ALL_CORE
For information on Fall & Injury Prevention, visit: [email or web address] OR  [email or web address] OR  https://www.cdc.gov/steadi/patient.html

## 2023-01-10 NOTE — DISCHARGE NOTE NURSING/CASE MANAGEMENT/SOCIAL WORK - PATIENT PORTAL LINK FT
You can access the FollowMyHealth Patient Portal offered by [facility] by registering at the following website: [email or web address]. By joining [email or web address]’s FollowMyHealth portal, you will also be able to view your health information using other applications (apps) compatible with our system.

## 2023-01-10 NOTE — DISCHARGE NOTE PROVIDER - NSDCFUSCHEDAPPT_GEN_ALL_CORE_FT
Genesis Aragon  [facility] Physician [facility]  ELECTROPH [address]  Scheduled Appointment: 01/26/2023    Rico Glover  [facility]  CARDIOLOGY [address]  Scheduled Appointment: 01/26/2023     Eulalio Allison  [facility]  PULED [address] [facility]  Scheduled Appointment: 01/24/2023    Genesis Aragon  [facility]  ELECTROPH [address]  Scheduled Appointment: 01/26/2023    Rico Glover  [facility]  CARDIOLOGY [address]  Scheduled Appointment: 01/26/2023

## 2023-01-10 NOTE — CHART NOTE - NSCHARTNOTEFT_GEN_A_CORE
Discussed with Dr. Mason.  Type A dissection recently repaired in 9/2022 without evolution of the thoracic/abdominal dissection based upon prior and current CT scans.  No indication for surgical intervention from a cts standpoint.  WIll sign off.  Please reconsult as necessary.
Source: Patient [ ]  Family [ ]   other [ x]EMR, rounds    Current Diet: Consistency CHO, Dash, soft and bite sized with Glucerna shake TID    Patient reports [ ] nausea  [ ] vomiting [ ] diarrhea [ ] constipation  [ ]chewing problems [ ] swallowing issues  [ ] other:     PO intake:  < 50% [ x]   50-75%  [x ]   %  [ ]  other :    Source for PO intake [ ] Patient [ ] family [ x] chart [ ] staff [ ] other    Enteral /Parenteral Nutrition:     Current Weight: 133.8# 1/3  no edema    % Weight Change     Pertinent Medications: MEDICATIONS  (STANDING):  albuterol/ipratropium for Nebulization 3 milliLiter(s) Nebulizer every 6 hours  apixaban 5 milliGRAM(s) Oral every 12 hours  collagenase Ointment 1 Application(s) Topical two times a day  collagenase Ointment 1 Application(s) Topical daily  dextrose 5%. 1000 milliLiter(s) (100 mL/Hr) IV Continuous <Continuous>  dextrose 5%. 1000 milliLiter(s) (50 mL/Hr) IV Continuous <Continuous>  dextrose 50% Injectable 25 Gram(s) IV Push once  dextrose 50% Injectable 12.5 Gram(s) IV Push once  dextrose 50% Injectable 25 Gram(s) IV Push once  fluconAZOLE IVPB 400 milliGRAM(s) IV Intermittent every 24 hours  glucagon  Injectable 1 milliGRAM(s) IntraMuscular once  hydrocortisone 25 milliGRAM(s) Oral daily  influenza  Vaccine (HIGH DOSE) 0.7 milliLiter(s) IntraMuscular once  insulin glargine Injectable (LANTUS) 23 Unit(s) SubCutaneous at bedtime  insulin lispro (ADMELOG) corrective regimen sliding scale   SubCutaneous Before meals and at bedtime  insulin lispro Injectable (ADMELOG) 2 Unit(s) SubCutaneous three times a day before meals  magnesium oxide 400 milliGRAM(s) Oral every 8 hours  meropenem Injectable 1000 milliGRAM(s) IV Push every 8 hours  mexiletine 200 milliGRAM(s) Oral every 8 hours  pantoprazole    Tablet 40 milliGRAM(s) Oral daily  polyethylene glycol 3350 17 Gram(s) Oral two times a day  senna 2 Tablet(s) Oral at bedtime  tamsulosin 0.4 milliGRAM(s) Oral at bedtime  tiotropium 2.5 MICROgram(s) Inhaler 2 Puff(s) Inhalation daily  trimethoprim  160 mG/sulfamethoxazole 800 mG 1 Tablet(s) Oral daily    MEDICATIONS  (PRN):  acetaminophen     Tablet .. 650 milliGRAM(s) Oral every 6 hours PRN Temp greater or equal to 38C (100.4F), Mild Pain (1 - 3), Moderate Pain (4 - 6)  dextrose Oral Gel 15 Gram(s) Oral once PRN Blood Glucose LESS THAN 70 milliGRAM(s)/deciliter  lactulose Syrup 10 Gram(s) Oral two times a day PRN constipation  ondansetron Injectable 4 milliGRAM(s) IV Push every 6 hours PRN Nausea and/or Vomiting    Pertinent Labs: CBC Full  -  ( 09 Jan 2023 07:25 )  WBC Count : 7.48 K/uL  RBC Count : 4.85 M/uL  Hemoglobin : 10.4 g/dL  Hematocrit : 34.6 %  Platelet Count - Automated : 208 K/uL  Mean Cell Volume : 71.3 fl  Mean Cell Hemoglobin : 21.4 pg  Mean Cell Hemoglobin Concentration : 30.1 gm/dL  Auto Neutrophil # : 4.72 K/uL  Auto Lymphocyte # : 0.47 K/uL  Auto Monocyte # : 1.14 K/uL  Auto Eosinophil # : 0.07 K/uL  Auto Basophil # : 0.00 K/uL  Auto Neutrophil % : 62.2 %  Auto Lymphocyte % : 6.3 %  Auto Monocyte % : 15.3 %  Auto Eosinophil % : 0.9 %  Auto Basophil % : 0.0 %      01-09 Na142 mmol/L Glu 45 mg/dL<LL> K+ 2.9 mmol/L<LL> Cr  0.44 mg/dL<L> BUN 11.0 mg/dL Phos n/a   Alb n/a   PAB n/a           Skin: stage 2 right elbow, stage 1 left butt, stage 1 left heel.    Nutrition focused physical exam conducted - found signs of malnutrition [ ]absent [ x]present    Subcutaneous fat loss: [ ] Orbital fat pads region, [ ]Buccal fat region, [ ]Triceps region,  [ ]Ribs region    Muscle wasting: [x ]Temples region, [ x]Clavicle region, [x ]Shoulder region, [x ]Scapula region, [ ]Interosseous region,  [ ]thigh region, [ ]Calf region    Estimated Needs:   [x ] no change since previous assessment  [ ] recalculated:     Current Nutrition Diagnosis: Malnutrition...  acute on chronic  related to inadequate energy intake in setting of sepsis, dementia, pneumonia, poor skin integrity, colostomy, tracheobronchomalacia  as evidenced by poor skin integrity, <75% intake > 1 mo, weight loss, sev muscle/fat depletion. As per team pt may require a PEG, awaiting eval. Po intake is poor.       Recommendations: Rec MVI, Vit C, [id number]  Continue to provide HBV protein sources.   Continue Glucerna shake TID to optimize po intake and provide an additional 220 kcal, 10g protein per serving     Monitoring and Evaluation:   [ x] PO intake [x ] Tolerance to diet prescription [X] Weights  [X] Follow up per protocol [X] Labs:

## 2023-01-10 NOTE — DISCHARGE NOTE PROVIDER - DETAILS OF MALNUTRITION DIAGNOSIS/DIAGNOSES
This patient has been assessed with a concern for Malnutrition and was treated during this hospitalization for the following Nutrition diagnosis/diagnoses:     -  01/06/2023: Severe protein-calorie malnutrition

## 2023-01-10 NOTE — DISCHARGE NOTE NURSING/CASE MANAGEMENT/SOCIAL WORK - NSDCFUADDAPPT_GEN_ALL_CORE_FT
STAR INFO:Sarita Allison on 01/24 at 5:30. If you are unable to attend your pre-scheduled appointment,   please contact the office directly at [phone] to reschedule.  pt d/c to [facility]

## 2023-01-10 NOTE — PROGRESS NOTE ADULT - ASSESSMENT
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 Fib on Eliquis, TIA, Seizures, DM 2, DVT, COPD, Fungemia on Fluconazole, MRSA on Bactrim, Right Foot Osteomyelitis and Pelvic Fracture presented to [place] [facility] from [facility] with fever, tachycardia and vomiting -- found to have aortic dissection so she was transferred to [facility] for evaluation.     *Sepsis   Likely due to Pneumonia (Aspiration vs HCAP - Gram Negative Rods) and Pyelonephritis  Blood cultures with MRSE, follow repeat cultures - negative so far   Urine culture from [place] with Klebsiella and Proteus ESBL   Continue Meropenem, last dose today   D/C Vancomycin   ID following   Sepsis resolved     *Aortic Dissection   Likely chronic   No intervention or repeat imaging as per CTS or Vascular Surgery     *History of Dysphagia   s/p MBS: soft bite sized solids with thin liquids     *Metabolic Encephalopathy  resolved     *History of Fungemia / MRSA  On chronic suppressive therapy   Continue Fluconazole   Resumed Bactrim   ID following     *Adrenal Insufficiency   Continue stress dose steroids (decreased Cortef to 25 mg q 24 hours)    *PAF  Continue Mexiletine and Eliquis     *DM 2  HbA1c 8.6 on 12/15/22  Accu checks and ISS  decreased Lantus to 23 units at bedtime due to hypoglycemia   Continue Premeal Admelog 2 units     *COPD  Continue Spiriva   DuoNebs     *Urinary Retention  Successful TOV on 1/6/23  Added Flomax    *RSV Infection  Supportive Care     *Left Heel / Right Elbow / Lower Back Ulcers  Podiatry / Wound Care Consults appreciated  Wound care     DVT Prophylaxis -- Patient is on Eliquis.    Advance Directives: Full Code.    Dispo: Likely [facility] tomorrow     Called patient's Thoracic Surgeon (Dr. Zohaib Zapien [phone])multiple times. Per daughter, he has few recommendations regarding patient's underlying tracheobronchomalacia -- may need temporary PEG. Due to Dr. Zapien has not called back, cleared to discharge back to [facility]  Pt will discharge to [facility] tomorrow AM    Updated patient's daughter Zoe via phone at [phone] today

## 2023-01-10 NOTE — DISCHARGE NOTE PROVIDER - NSDCFUADDINST_GEN_ALL_CORE_FT
None It is important to see your primary physician as well as any specialty physicians within the next week to perform a comprehensive medical review.  Call their offices for an appointment as soon as you leave the hospital.  You will also need to see them for renewal of your medications.  If have any difficulty following with a physician, contact the [facility] [phone] or via [email or web address].   To obtain your results, you can access the [email or web address] Patient Portal at [email or web address].  Your medical issues appear to be stable at this time, but if your symptoms recur or worsen, contact your physicians and/or return to the hospital if necessary.  If you encounter any issues or questions with your medication, call your physicians before stopping the medication.  Do not drive.  Limit your diet to 2 grams of sodium daily.

## 2023-01-10 NOTE — DISCHARGE NOTE PROVIDER - NSDCCPCAREPLAN_GEN_ALL_CORE_FT
PRINCIPAL DISCHARGE DIAGNOSIS  Diagnosis: Septic shock  Assessment and Plan of Treatment: lung infection + kidney infection- completed IV antibiotic course  passed trial of void  no supplemental oxygen required      SECONDARY DISCHARGE DIAGNOSES  Diagnosis: Multifocal pneumonia  Assessment and Plan of Treatment: resolved    Diagnosis: Respiratory syncytial virus (RSV)  Assessment and Plan of Treatment: resolved    Diagnosis: Acute respiratory failure with hypoxia  Assessment and Plan of Treatment: resolved    Diagnosis: Pyelonephritis  Assessment and Plan of Treatment: resolved    Diagnosis: DM (diabetes mellitus)  Assessment and Plan of Treatment: Your A1c is 8.2% which is significantly elevated and indicates your diabetes is uncontrolled. Uncontrolled diabetes is aossicated with but not limited to an increased risk of heart disease, stroke, hypertension (high blood pressire), atherosclerosis, nerve damage, increased risk of infection, blindness and decreased lifespan. Please take all medications as prescribed, eat carbohydrate controlled diet, and test your blood sugars reguarly. It is advise that you follow up with your primary care provider/endocrinologist outpatient to review both your diet and lifestyle as well as adherance to your current medical management regime in one week.   take all medications as prescribed, adhere to a consistent carb diet     PRINCIPAL DISCHARGE DIAGNOSIS  Diagnosis: Septic shock  Assessment and Plan of Treatment: lung infection + kidney infection- completed IV antibiotic course  passed trial of void  no supplemental oxygen required      SECONDARY DISCHARGE DIAGNOSES  Diagnosis: Multifocal pneumonia  Assessment and Plan of Treatment: resolved    Diagnosis: Respiratory syncytial virus (RSV)  Assessment and Plan of Treatment: resolved    Diagnosis: Acute respiratory failure with hypoxia  Assessment and Plan of Treatment: resolved    Diagnosis: Pyelonephritis  Assessment and Plan of Treatment: resolved    Diagnosis: DM (diabetes mellitus)  Assessment and Plan of Treatment: Your A1c is 8.2% which is significantly elevated and indicates your diabetes is uncontrolled. Uncontrolled diabetes is aossicated with but not limited to an increased risk of heart disease, stroke, hypertension (high blood pressire), atherosclerosis, nerve damage, increased risk of infection, blindness and decreased lifespan. Please take all medications as prescribed, eat carbohydrate controlled diet, and test your blood sugars reguarly. It is advise that you follow up with your primary care provider/endocrinologist outpatient to review both your diet and lifestyle as well as adherance to your current medical management regime in one week.       Diagnosis: Dysphagia  Assessment and Plan of Treatment: Modified barium swallow evalutation: recommended soft and bite sized solids with thin liquids  PEG under consideration pending outpatient follow up with Dr. Zapien    Diagnosis: Aortic dissection  Assessment and Plan of Treatment: chronic, no intervention warranted at present    Diagnosis: Afib  Assessment and Plan of Treatment: Atrial fibrillation is a heart arrhythmia were the atria beat rapidly but inefficently. This condition when not well controlled leads to an increased risk of forming blood clots that leads to an increased risk of stroke. You are on an anticoagulation (blood thinner) medication called Eliquis. While on an anticoagulation medication you are at increased risk for bleeding, the addition of alcohol can further increase this risk. Please notify your PCP immediately if you experience any bleeding events or experience any significant falls/accidents/trauma. Please continue to take all medications as prescribed and follow up with your cardiologist in one week.    Diagnosis: History of adrenal insufficiency  Assessment and Plan of Treatment: you received a stress dose of steroids while actively septic, no further acute treatment needed    Diagnosis: Severe protein-calorie malnutrition  Assessment and Plan of Treatment: you were found to have significant nutritional deficits, please supplemental with Glucerna shakes three times a day     Principal Discharge DX:	Strep throat

## 2023-01-11 LAB
ANION GAP SERPL CALC-SCNC: 8 MMOL/L — SIGNIFICANT CHANGE UP (ref 5–17)
BUN SERPL-MCNC: 8.6 MG/DL — SIGNIFICANT CHANGE UP (ref 8–20)
CALCIUM SERPL-MCNC: 8.7 MG/DL — SIGNIFICANT CHANGE UP (ref 8.4–10.5)
CHLORIDE SERPL-SCNC: 102 MMOL/L — SIGNIFICANT CHANGE UP (ref 96–108)
CO2 SERPL-SCNC: 28 MMOL/L — SIGNIFICANT CHANGE UP (ref 22–29)
CREAT SERPL-MCNC: 0.49 MG/DL — LOW (ref 0.5–1.3)
EGFR: 99 ML/MIN/1.73M2 — SIGNIFICANT CHANGE UP
GLUCOSE BLDC GLUCOMTR-MCNC: 152 MG/DL — HIGH (ref 70–99)
GLUCOSE BLDC GLUCOMTR-MCNC: 211 MG/DL — HIGH (ref 70–99)
GLUCOSE BLDC GLUCOMTR-MCNC: 218 MG/DL — HIGH (ref 70–99)
GLUCOSE BLDC GLUCOMTR-MCNC: 227 MG/DL — HIGH (ref 70–99)
GLUCOSE SERPL-MCNC: 189 MG/DL — HIGH (ref 70–99)
POTASSIUM SERPL-MCNC: 4.5 MMOL/L — SIGNIFICANT CHANGE UP (ref 3.5–5.3)
POTASSIUM SERPL-SCNC: 4.5 MMOL/L — SIGNIFICANT CHANGE UP (ref 3.5–5.3)
SODIUM SERPL-SCNC: 138 MMOL/L — SIGNIFICANT CHANGE UP (ref 135–145)

## 2023-01-11 PROCEDURE — 99239 HOSP IP/OBS DSCHRG MGMT >30: CPT

## 2023-01-11 RX ORDER — TAMSULOSIN HYDROCHLORIDE 0.4 MG/1
1 CAPSULE ORAL
Qty: 0 | Refills: 0 | DISCHARGE
Start: 2023-01-11

## 2023-01-11 RX ORDER — ACETAMINOPHEN 500 MG
1 TABLET ORAL
Qty: 0 | Refills: 0 | DISCHARGE

## 2023-01-11 RX ORDER — INSULIN LISPRO 100/ML
1 VIAL (ML) SUBCUTANEOUS
Qty: 0 | Refills: 0 | DISCHARGE

## 2023-01-11 RX ORDER — INSULIN GLARGINE 100 [IU]/ML
23 INJECTION, SOLUTION SUBCUTANEOUS
Qty: 0 | Refills: 0 | DISCHARGE
Start: 2023-01-11

## 2023-01-11 RX ORDER — INSULIN LISPRO 100/ML
5 VIAL (ML) SUBCUTANEOUS
Qty: 0 | Refills: 0 | DISCHARGE

## 2023-01-11 RX ORDER — INSULIN GLARGINE 100 [IU]/ML
45 INJECTION, SOLUTION SUBCUTANEOUS
Qty: 0 | Refills: 0 | DISCHARGE
Start: 2023-01-11

## 2023-01-11 RX ORDER — COLLAGENASE CLOSTRIDIUM HIST. 250 UNIT/G
1 OINTMENT (GRAM) TOPICAL
Qty: 0 | Refills: 0 | DISCHARGE
Start: 2023-01-11

## 2023-01-11 RX ORDER — METOPROLOL TARTRATE 50 MG
0.5 TABLET ORAL
Qty: 0 | Refills: 0 | DISCHARGE

## 2023-01-11 RX ORDER — INSULIN LISPRO 100/ML
2 VIAL (ML) SUBCUTANEOUS
Qty: 0 | Refills: 0 | DISCHARGE
Start: 2023-01-11

## 2023-01-11 RX ADMIN — MEXILETINE HYDROCHLORIDE 200 MILLIGRAM(S): 150 CAPSULE ORAL at 22:13

## 2023-01-11 RX ADMIN — MAGNESIUM OXIDE 400 MG ORAL TABLET 400 MILLIGRAM(S): 241.3 TABLET ORAL at 14:00

## 2023-01-11 RX ADMIN — TIOTROPIUM BROMIDE 2 PUFF(S): 18 CAPSULE ORAL; RESPIRATORY (INHALATION) at 10:08

## 2023-01-11 RX ADMIN — Medication 1 APPLICATION(S): at 05:59

## 2023-01-11 RX ADMIN — MAGNESIUM OXIDE 400 MG ORAL TABLET 400 MILLIGRAM(S): 241.3 TABLET ORAL at 06:00

## 2023-01-11 RX ADMIN — Medication 2 UNIT(S): at 13:59

## 2023-01-11 RX ADMIN — Medication 1: at 22:19

## 2023-01-11 RX ADMIN — Medication 650 MILLIGRAM(S): at 22:19

## 2023-01-11 RX ADMIN — Medication 2: at 09:07

## 2023-01-11 RX ADMIN — APIXABAN 5 MILLIGRAM(S): 2.5 TABLET, FILM COATED ORAL at 17:47

## 2023-01-11 RX ADMIN — SENNA PLUS 2 TABLET(S): 8.6 TABLET ORAL at 22:14

## 2023-01-11 RX ADMIN — MEROPENEM 1000 MILLIGRAM(S): 1 INJECTION INTRAVENOUS at 13:59

## 2023-01-11 RX ADMIN — MEROPENEM 1000 MILLIGRAM(S): 1 INJECTION INTRAVENOUS at 05:58

## 2023-01-11 RX ADMIN — POLYETHYLENE GLYCOL 3350 17 GRAM(S): 17 POWDER, FOR SOLUTION ORAL at 06:00

## 2023-01-11 RX ADMIN — Medication 2 UNIT(S): at 09:06

## 2023-01-11 RX ADMIN — Medication 3 MILLILITER(S): at 10:08

## 2023-01-11 RX ADMIN — Medication 2: at 17:46

## 2023-01-11 RX ADMIN — Medication 25 MILLIGRAM(S): at 05:59

## 2023-01-11 RX ADMIN — Medication 2 UNIT(S): at 17:46

## 2023-01-11 RX ADMIN — MEXILETINE HYDROCHLORIDE 200 MILLIGRAM(S): 150 CAPSULE ORAL at 05:59

## 2023-01-11 RX ADMIN — INSULIN GLARGINE 23 UNIT(S): 100 INJECTION, SOLUTION SUBCUTANEOUS at 22:18

## 2023-01-11 RX ADMIN — Medication 1 TABLET(S): at 17:47

## 2023-01-11 RX ADMIN — MEXILETINE HYDROCHLORIDE 200 MILLIGRAM(S): 150 CAPSULE ORAL at 14:00

## 2023-01-11 RX ADMIN — Medication 1 APPLICATION(S): at 17:47

## 2023-01-11 RX ADMIN — MAGNESIUM OXIDE 400 MG ORAL TABLET 400 MILLIGRAM(S): 241.3 TABLET ORAL at 22:23

## 2023-01-11 RX ADMIN — PANTOPRAZOLE SODIUM 40 MILLIGRAM(S): 20 TABLET, DELAYED RELEASE ORAL at 17:47

## 2023-01-11 RX ADMIN — APIXABAN 5 MILLIGRAM(S): 2.5 TABLET, FILM COATED ORAL at 05:59

## 2023-01-11 RX ADMIN — FLUCONAZOLE 100 MILLIGRAM(S): 150 TABLET ORAL at 09:07

## 2023-01-11 RX ADMIN — Medication 1 APPLICATION(S): at 17:48

## 2023-01-11 RX ADMIN — Medication 2: at 13:58

## 2023-01-11 NOTE — PROGRESS NOTE ADULT - ASSESSMENT
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 Fib on Eliquis, TIA, Seizures, DM 2, DVT, COPD, Fungemia on Fluconazole, MRSA on Bactrim, Right Foot Osteomyelitis and Pelvic Fracture presented to [place] [facility] from [facility] with fever, tachycardia and vomiting -- found to have aortic dissection so she was transferred to [facility] for evaluation.  Per CT Surgery, no further intervention.     *Sepsis   Likely due to Pneumonia (Aspiration vs HCAP - Gram Negative Rods) and Pyelonephritis  Blood cultures with MRSE, follow repeat cultures - negative so far   Urine culture from [place] with Klebsiella and Proteus ESBL   Continue Meropenem, last dose today   D/C Vancomycin   ID following   Sepsis resolved     *Aortic Dissection   Likely chronic   No intervention or repeat imaging as per CTS or Vascular Surgery     *History of Dysphagia, Tracheomalacia - s/p MBS: soft bite sized solids with thin liquids.  Outpatient f/u with CTS regarding role for PEG.  She is presently tolerating po intake.     *Metabolic Encephalopathy  resolved     *History of Fungemia / MRSA  On chronic suppressive therapy   Continue Fluconazole   Resumed Bactrim   ID following     *Adrenal Insufficiency   Continue stress dose steroids (decreased Cortef to 25 mg q 24 hours)    *PAF  Continue Mexiletine and Eliquis     *DM 2  HbA1c 8.6 on 12/15/22  Accu checks and ISS  decreased Lantus to 23 units at bedtime due to hypoglycemia   Continue Premeal Admelog 2 units     *COPD  Continue Spiriva   DuoNebs     *Urinary Retention  Successful TOV on 1/6/23  Added Flomax    *RSV Infection  Supportive Care     *Left Heel / Right Elbow / Lower Back Ulcers  Podiatry / Wound Care Consults appreciated  Wound care     DVT Prophylaxis -- Patient is on Eliquis.    Advance Directives: Full Code.    Dispo: Stable for [facility].      Dr. Woodard had attempted to call patient's Thoracic Surgeon (Dr. Zohaib Zapien [phone])multiple times, unable to reach.    Stable for [facility].  I left a message for daughter Zoe via phone at [phone]    72 year old female with recurrent hospitalization and complex medical history including Adrenal Insufficiency, Type I Aortic Dissection s/p Bentall Procedure / Hemiarch Replacement on 9/7, Tracheobronchomalacia, Colon Cancer s/p Resection with Colostomy, Paroxysmal A. Fib on Eliquis, TIA, Seizures, DM 2, DVT, COPD, Fungemia on Fluconazole, MRSA on Bactrim, Right Foot Osteomyelitis and Pelvic Fracture presented to [place] ER from [facility] with fever, tachycardia and vomiting -- found to have aortic dissection so she was transferred to [facility] for evaluation.  Per CT Surgery, no further intervention.     *Sepsis   Likely due to Pneumonia (Aspiration vs HCAP - Gram Negative Rods) and Pyelonephritis  Blood cultures with MRSE, follow repeat cultures - negative so far   Urine culture from [place] with Klebsiella and Proteus ESBL   Continue Meropenem, last dose today   D/C Vancomycin   ID following   Sepsis resolved     *Aortic Dissection   Likely chronic   No intervention or repeat imaging as per CTS or Vascular Surgery     *History of Dysphagia, Tracheomalacia - s/p MBS: soft bite sized solids with thin liquids.  Outpatient f/u with CTS regarding role for PEG.  She is presently tolerating po intake.     *Metabolic Encephalopathy  resolved     *History of Fungemia / MRSA  On chronic suppressive therapy   Continue Fluconazole   Resumed Bactrim   ID following     *Adrenal Insufficiency   Continue stress dose steroids (decreased Cortef to 25 mg q 24 hours)    *PAF  Continue Mexiletine and Eliquis     *DM 2  HbA1c 8.6 on 12/15/22  Accu checks and ISS  decreased Lantus to 23 units at bedtime due to hypoglycemia   Continue Premeal Admelog 2 units     *COPD  Continue Spiriva   DuoNebs     *Urinary Retention  Successful TOV on 1/6/23  Added Flomax    *RSV Infection  Supportive Care     *Left Heel / Right Elbow / Lower Back Ulcers  Podiatry / Wound Care Consults appreciated  Wound care     DVT Prophylaxis -- Patient is on Eliquis.    Advance Directives: Full Code.    Dispo: Stable for [facility].      Dr. Woodard had attempted to call patient's Thoracic Surgeon (Dr. Zohaib Zapien [phone])multiple times, unable to reach.    Stable for [facility].  I dw daughter Zoe via phone at [phone].  Reviewed MBS with daughter, option of PEG placement discussed.  She agreed on trial of po intake per S&S recommendations, and outpatient f/u with Dr. Zapien regarding furhter plans.  She consideration and balance of reaspiration vs quality of life was reviewed.   Agrees to proceed with [facility].

## 2023-01-12 ENCOUNTER — NON-APPOINTMENT (OUTPATIENT)
Age: 73
End: 2023-01-12

## 2023-01-12 VITALS
HEART RATE: 76 BPM | DIASTOLIC BLOOD PRESSURE: 76 MMHG | SYSTOLIC BLOOD PRESSURE: 122 MMHG | OXYGEN SATURATION: 93 % | RESPIRATION RATE: 18 BRPM | TEMPERATURE: 98 F

## 2023-01-12 LAB — GLUCOSE BLDC GLUCOMTR-MCNC: 131 MG/DL — HIGH (ref 70–99)

## 2023-01-12 PROCEDURE — 94760 N-INVAS EAR/PLS OXIMETRY 1: CPT

## 2023-01-12 PROCEDURE — 93005 ELECTROCARDIOGRAM TRACING: CPT

## 2023-01-12 PROCEDURE — 87040 BLOOD CULTURE FOR BACTERIA: CPT

## 2023-01-12 PROCEDURE — 92611 MOTION FLUOROSCOPY/SWALLOW: CPT

## 2023-01-12 PROCEDURE — 92610 EVALUATE SWALLOWING FUNCTION: CPT

## 2023-01-12 PROCEDURE — 96374 THER/PROPH/DIAG INJ IV PUSH: CPT

## 2023-01-12 PROCEDURE — 85014 HEMATOCRIT: CPT

## 2023-01-12 PROCEDURE — 96375 TX/PRO/DX INJ NEW DRUG ADDON: CPT

## 2023-01-12 PROCEDURE — 80202 ASSAY OF VANCOMYCIN: CPT

## 2023-01-12 PROCEDURE — 83735 ASSAY OF MAGNESIUM: CPT

## 2023-01-12 PROCEDURE — 82435 ASSAY OF BLOOD CHLORIDE: CPT

## 2023-01-12 PROCEDURE — 94640 AIRWAY INHALATION TREATMENT: CPT

## 2023-01-12 PROCEDURE — 85730 THROMBOPLASTIN TIME PARTIAL: CPT

## 2023-01-12 PROCEDURE — 74230 X-RAY XM SWLNG FUNCJ C+: CPT

## 2023-01-12 PROCEDURE — 82947 ASSAY GLUCOSE BLOOD QUANT: CPT

## 2023-01-12 PROCEDURE — 82803 BLOOD GASES ANY COMBINATION: CPT

## 2023-01-12 PROCEDURE — 85610 PROTHROMBIN TIME: CPT

## 2023-01-12 PROCEDURE — 99232 SBSQ HOSP IP/OBS MODERATE 35: CPT

## 2023-01-12 PROCEDURE — 85018 HEMOGLOBIN: CPT

## 2023-01-12 PROCEDURE — 87150 DNA/RNA AMPLIFIED PROBE: CPT

## 2023-01-12 PROCEDURE — 36415 COLL VENOUS BLD VENIPUNCTURE: CPT

## 2023-01-12 PROCEDURE — 84132 ASSAY OF SERUM POTASSIUM: CPT

## 2023-01-12 PROCEDURE — 80053 COMPREHEN METABOLIC PANEL: CPT

## 2023-01-12 PROCEDURE — 80048 BASIC METABOLIC PNL TOTAL CA: CPT

## 2023-01-12 PROCEDURE — 82962 GLUCOSE BLOOD TEST: CPT

## 2023-01-12 PROCEDURE — 81001 URINALYSIS AUTO W/SCOPE: CPT

## 2023-01-12 PROCEDURE — 85027 COMPLETE CBC AUTOMATED: CPT

## 2023-01-12 PROCEDURE — 87086 URINE CULTURE/COLONY COUNT: CPT

## 2023-01-12 PROCEDURE — 85025 COMPLETE CBC W/AUTO DIFF WBC: CPT

## 2023-01-12 PROCEDURE — 84295 ASSAY OF SERUM SODIUM: CPT

## 2023-01-12 PROCEDURE — 87186 SC STD MICRODIL/AGAR DIL: CPT

## 2023-01-12 PROCEDURE — 99285 EMERGENCY DEPT VISIT HI MDM: CPT | Mod: 25

## 2023-01-12 PROCEDURE — 87077 CULTURE AEROBIC IDENTIFY: CPT

## 2023-01-12 PROCEDURE — 71045 X-RAY EXAM CHEST 1 VIEW: CPT

## 2023-01-12 PROCEDURE — 82330 ASSAY OF CALCIUM: CPT

## 2023-01-12 PROCEDURE — U0003: CPT

## 2023-01-12 PROCEDURE — U0005: CPT

## 2023-01-12 PROCEDURE — 83605 ASSAY OF LACTIC ACID: CPT

## 2023-01-12 RX ADMIN — FLUCONAZOLE 100 MILLIGRAM(S): 150 TABLET ORAL at 06:21

## 2023-01-12 RX ADMIN — APIXABAN 5 MILLIGRAM(S): 2.5 TABLET, FILM COATED ORAL at 05:56

## 2023-01-12 RX ADMIN — MAGNESIUM OXIDE 400 MG ORAL TABLET 400 MILLIGRAM(S): 241.3 TABLET ORAL at 05:57

## 2023-01-12 RX ADMIN — Medication 2 UNIT(S): at 08:07

## 2023-01-12 RX ADMIN — MEXILETINE HYDROCHLORIDE 200 MILLIGRAM(S): 150 CAPSULE ORAL at 05:57

## 2023-01-12 RX ADMIN — Medication 25 MILLIGRAM(S): at 06:00

## 2023-01-12 RX ADMIN — POLYETHYLENE GLYCOL 3350 17 GRAM(S): 17 POWDER, FOR SOLUTION ORAL at 06:04

## 2023-01-12 RX ADMIN — Medication 1 APPLICATION(S): at 06:02

## 2023-01-12 NOTE — PROGRESS NOTE ADULT - NUTRITIONAL ASSESSMENT
This patient has been assessed with a concern for Malnutrition and has been determined to have a diagnosis/diagnoses of Severe protein-calorie malnutrition.    This patient is being managed with:   Diet Consistent Carbohydrate/No Snacks-  DASH/TLC {Sodium & Cholesterol Restricted} (DASH)  Soft and Bite Sized ([id number])  Supplement Feeding Modality:  Oral  Glucerna Shake Cans or Servings Per Day:  1       Frequency:  Three Times a day  Entered: Jan 5 2023  1:02PM    

## 2023-01-12 NOTE — PROGRESS NOTE ADULT - SUBJECTIVE AND OBJECTIVE BOX
HPI: 82 year old female with ?dementia - sent from [place] for Aortic aneurysm evaluation-y  BIBA from [facility] for worsening SOB since last night [place] -tx to [facility] ,seen by Ct Sx ->and no surgical intervention advised  significant medical hx includes but is not limited to adrenal insufficiency, aortic insufficiency, asthma, Afib on Eliquis, colorectal cancer s/p resection with colostomy bag, COPD, DM, DVT, pelvic fracture, rectal bleeding, seizure, TIA, tracheobronchomalacia, type I aortic dissection s/p Bentall procedure and hemiarch replacement 9/6 , candemia and MRSA  patient is unresponsive and hypoxic and hypotensive - intermittent opens eyes   currently lethargic- daughter at bedside - states she has never been told patient has dementia but she has been in and out of hospitals and rehabs for over 9 months now.- with decubitus ulcers on legs elbow and back- daughter has pictures- states mom has been not really ambulating and not been improving for several months, but does get better neurologically and can hold a conversation at baseline    Podiatry HPI: Podiatry consulted regarding 72y year old Female patient regarding left foot heel wound. Patient unsure about how long wound has been present. States that she has been seen by woundcare doctors at rehab facilities. Notes some discomfort to the left foot with palpation of wound and timi wound area. Also notes discomfort on the right foot, due to rubbing.        Patient denies fevers, chills, nausea, or vomiting    Medications acetaminophen     Tablet .. 650 milliGRAM(s) Oral every 6 hours PRN  albuterol    90 MICROgram(s) HFA Inhaler 2 Puff(s) Inhalation every 6 hours  apixaban 5 milliGRAM(s) Oral every 12 hours  collagenase Ointment 1 Application(s) Topical two times a day  dextrose 5%. 1000 milliLiter(s) IV Continuous <Continuous>  dextrose 5%. 1000 milliLiter(s) IV Continuous <Continuous>  dextrose 50% Injectable 25 Gram(s) IV Push once  dextrose 50% Injectable 12.5 Gram(s) IV Push once  dextrose 50% Injectable 25 Gram(s) IV Push once  dextrose Oral Gel 15 Gram(s) Oral once PRN  fluconAZOLE IVPB 400 milliGRAM(s) IV Intermittent every 24 hours  glucagon  Injectable 1 milliGRAM(s) IntraMuscular once  hydrocortisone sodium succinate Injectable 25 milliGRAM(s) IV Push every 8 hours  influenza  Vaccine (HIGH DOSE) 0.7 milliLiter(s) IntraMuscular once  insulin glargine Injectable (LANTUS) 20 Unit(s) SubCutaneous at bedtime  insulin lispro (ADMELOG) corrective regimen sliding scale   SubCutaneous Before meals and at bedtime  lactulose Syrup 10 Gram(s) Oral two times a day PRN  magnesium oxide 400 milliGRAM(s) Oral every 8 hours  meropenem Injectable 1000 milliGRAM(s) IV Push every 8 hours  mexiletine 200 milliGRAM(s) Oral every 8 hours  ondansetron Injectable 4 milliGRAM(s) IV Push every 6 hours PRN  pantoprazole    Tablet 40 milliGRAM(s) Oral daily  polyethylene glycol 3350 17 Gram(s) Oral two times a day  senna 2 Tablet(s) Oral at bedtime  sodium chloride 0.9%. 1000 milliLiter(s) IV Continuous <Continuous>  tamsulosin 0.4 milliGRAM(s) Oral at bedtime  tiotropium 2.5 MICROgram(s) Inhaler 2 Puff(s) Inhalation daily  vancomycin  IVPB 750 milliGRAM(s) IV Intermittent every 12 hours    FHFamily history of asthma    Family history of breast cancer (Sibling)    Family history of diabetes mellitus type II    ,   PMHAtrial fibrillation    Diabetes    Hypertension    COPD (chronic obstructive pulmonary disease)    Adrenal insufficiency    Aortic insufficiency    Pelvic fracture    Asthma    Hypertension    Tracheobronchomalacia    Colorectal cancer    Aortic disease    Rectal bleeding    Seizure    DVT (deep venous thrombosis)    TIA (transient ischemic attack)       PSHHistory of partial hysterectomy    H/O total knee replacement, bilateral    History of sinus surgery    Exostosis of orbit, left    H/O pelvic surgery    History of surgery    History of tracheomalacia    S/P bronchoscopy    Rectal bleeding        Labs                          9.1    6.60  )-----------( 175      ( 06 Jan 2023 05:30 )             30.0      01-06    142  |  109<H>  |  9.5  ----------------------------<  104<H>  3.9   |  23.0  |  0.40<L>    Ca    8.4      06 Jan 2023 05:30  Mg     1.8     01-06    TPro  5.3<L>  /  Alb  2.8<L>  /  TBili  0.2<L>  /  DBili  x   /  AST  28  /  ALT  19  /  AlkPhos  63  01-05     Vital Signs Last 24 Hrs  T(C): 36.6 (06 Jan 2023 10:06), Max: 36.6 (05 Jan 2023 16:09)  T(F): 97.9 (06 Jan 2023 10:06), Max: 97.9 (05 Jan 2023 16:09)  HR: 63 (06 Jan 2023 10:06) (63 - 79)  BP: 138/81 (06 Jan 2023 10:06) (136/77 - 146/84)  BP(mean): --  RR: 18 (06 Jan 2023 10:06) (18 - 18)  SpO2: 97% (06 Jan 2023 10:06) (96% - 97%)    Parameters below as of 06 Jan 2023 10:06  Patient On (Oxygen Delivery Method): room air      Sedimentation Rate, Erythrocyte: 39 mm/hr (12-15-22 @ 06:19)  Sedimentation Rate, Erythrocyte: 115 mm/hr (10-10-22 @ 13:56)         C-Reactive Protein, Serum: 289 mg/L (12-15-22 @ 06:19)  C-Reactive Protein, Serum: 75 mg/L (10-10-22 @ 13:56)   WBC Count: 6.60 K/uL (01-06-23 @ 05:30)      ROS: All others negative unless otherwise stated in the HPI    Physical exam:  Vascular: DP, PT palpable bilaterally. CFT intact to all digits  Derm: Left posterior heel noted with small, approximately .5 x .6 x .2 cm wound, hyperkeratotic border, fibrotic base, -PTB, -drainage, - malodor. Right foot dorsomedial midfoot noted with small abrasion  Neuro: Protective sensation grossly intact  MSK: Patient able to move all extremities    Assessment:  Pressure ulcer    Plan:  Patient evaluated, chart reviewed  Discussed with patient etiology of their condition  Ordered left foot X ray  No culture needed - area is grossly uninfected  replaced aquacel, allevyn pad dressing  WCO placed: Left foot to be dressed with santyl, DSD, replace every other day  Pending X ray result stable from podiatric standpoint  Discussed and seen with attending Dr. Randhawa
INFECTIOUS DISEASES AND INTERNAL MEDICINE at [place]  =======================================================  Choco Dowell MD  Diplomates American Board of Internal Medicine and Infectious Diseases  Telephone [phone]  Fax            [phone]  =======================================================    RAYRAY RODRIGUEZ [id number]    Follow up: rsv fevers UTI    Allergies:  aspirin (Short breath)  Avelox (Short breath; Pruritus)  cefepime (Anaphylaxis)  codeine (Short breath)  Dilaudid (Short breath)  iodine (Short breath; Swelling)  penicillin (Anaphylaxis)  shellfish (Anaphylaxis)  tetanus toxoid (Short breath)  Valium (Short breath)      Medications:  albuterol    90 MICROgram(s) HFA Inhaler 2 Puff(s) Inhalation every 6 hours  apixaban 5 milliGRAM(s) Oral every 12 hours  collagenase Ointment 1 Application(s) Topical two times a day  dextrose 5%. 1000 milliLiter(s) IV Continuous <Continuous>  dextrose 5%. 1000 milliLiter(s) IV Continuous <Continuous>  dextrose 50% Injectable 25 Gram(s) IV Push once  dextrose 50% Injectable 12.5 Gram(s) IV Push once  dextrose 50% Injectable 25 Gram(s) IV Push once  dextrose Oral Gel 15 Gram(s) Oral once PRN  fluconAZOLE IVPB 400 milliGRAM(s) IV Intermittent every 24 hours  glucagon  Injectable 1 milliGRAM(s) IntraMuscular once  hydrocortisone sodium succinate Injectable 50 milliGRAM(s) IV Push every 8 hours  influenza  Vaccine (HIGH DOSE) 0.7 milliLiter(s) IntraMuscular once  insulin lispro (ADMELOG) corrective regimen sliding scale   SubCutaneous three times a day before meals  lactulose Syrup 10 Gram(s) Oral two times a day PRN  magnesium oxide 400 milliGRAM(s) Oral every 8 hours  meropenem Injectable 1000 milliGRAM(s) IV Push every 8 hours  mexiletine 200 milliGRAM(s) Oral every 8 hours  polyethylene glycol 3350 17 Gram(s) Oral two times a day  sodium chloride 0.9%. 1000 milliLiter(s) IV Continuous <Continuous>  tiotropium 2.5 MICROgram(s) Inhaler 2 Puff(s) Inhalation daily       SOCIAL       FAMILY   FAMILY HISTORY:  Family history of asthma    Family history of breast cancer (Sibling)    Family history of diabetes mellitus type II      REVIEW OF SYSTEMS:  CONSTITUTIONAL:  No Fever or chills  HEENT:   No diplopia or blurred vision.  No earache, sore throat or runny nose.  CARDIOVASCULAR:  No pressure, squeezing, strangling, tightness, heaviness or aching about the chest, neck, axilla or epigastrium.  RESPIRATORY:    cough, , PND or orthopnea.  GASTROINTESTINAL:  No nausea, vomiting or diarrhea.  GENITOURINARY:  No dysuria, frequency or urgency. No Blood in urine  MUSCULOSKELETAL:   moves all joints  SKIN:  No change in skin, hair or nails.  NEUROLOGIC:  r weakness.              Physical Exam:   Vital Signs Last 24 Hrs  T(C): 36.8 (09 Jan 2023 12:29), Max: 36.8 (09 Jan 2023 04:32)  T(F): 98.2 (09 Jan 2023 12:29), Max: 98.2 (09 Jan 2023 04:32)  HR: 84 (09 Jan 2023 12:29) (79 - 92)  BP: 116/74 (09 Jan 2023 12:29) (101/60 - 132/82)  BP(mean): --  RR: 18 (09 Jan 2023 12:29) (18 - 18)  SpO2: 93% (09 Jan 2023 12:29) (93% - 97%)    Parameters below as of 09 Jan 2023 12:29  Patient On (Oxygen Delivery Method): room air              GEN: NAD,   HEENT: normocephalic and atraumatic. EOMI. CEZAR.    NECK: Supple. No carotid bruits.  No lymphadenopathy or thyromegaly.  LUNGS: COARSE BREATH SOUNDS   HEART: Regular rate and rhythm without murmur.  ABDOMEN: Soft, nontender, and nondistended.  Positive bowel sounds.    : No CVA tenderness  EXTREMITIES: Without any cyanosis, clubbing, rash, lesions or edema.  MSK: no joint swelling  NEUROLOGIC:  AWAKE ALERT         Labs:  Vitals:  ===   =======================================================  Current Antibiotics:     meropenem Injectable 1000 milliGRAM(s) IV Push every 8 hours       Other medications:  albuterol    90 MICROgram(s) HFA Inhaler 2 Puff(s) Inhalation every 6 hours  apixaban 5 milliGRAM(s) Oral every 12 hours  collagenase Ointment 1 Application(s) Topical two times a day  dextrose 5%. 1000 milliLiter(s) IV Continuous <Continuous>  dextrose 5%. 1000 milliLiter(s) IV Continuous <Continuous>  dextrose 50% Injectable 25 Gram(s) IV Push once  dextrose 50% Injectable 12.5 Gram(s) IV Push once  dextrose 50% Injectable 25 Gram(s) IV Push once  glucagon  Injectable 1 milliGRAM(s) IntraMuscular once  hydrocortisone sodium succinate Injectable 50 milliGRAM(s) IV Push every 8 hours  influenza  Vaccine (HIGH DOSE) 0.7 milliLiter(s) IntraMuscular once  insulin lispro (ADMELOG) corrective regimen sliding scale   SubCutaneous three times a day before meals  magnesium oxide 400 milliGRAM(s) Oral every 8 hours  mexiletine 200 milliGRAM(s) Oral every 8 hours  polyethylene glycol 3350 17 Gram(s) Oral two times a day  sodium chloride 0.9%. 1000 milliLiter(s) IV Continuous <Continuous>  tiotropium 2.5 MICROgram(s) Inhaler 2 Puff(s) Inhalation daily      =======================================================  Labs:                                                10.4   7.48  )-----------( 208      ( 09 Jan 2023 07:25 )             34.6   01-09    142  |  102  |  11.0  ----------------------------<  45<LL>  2.9<LL>   |  33.0<H>  |  0.44<L>    Ca    8.5      09 Jan 2023 07:25  Mg     1.8     01-09            WBC Count: 11.64 K/uL (01-04-23 @ 00:32)  WBC Count: 11.86 K/uL (01-03-23 @ 09:16)  WBC Count: 12.78 K/uL (01-03-23 @ 00:41)    SARS-CoV-2 Result: NotDetec (01-03-23 @ 00:10)  SARS-CoV-2: Detected (12-14-22 @ 10:56)      Alkaline Phosphatase, Serum: 80 U/L (01-04-23 @ 00:32)  Alkaline Phosphatase, Serum: 88 U/L (01-03-23 @ 09:16)  Alkaline Phosphatase, Serum: 112 U/L (01-03-23 @ 00:41)  Alanine Aminotransferase (ALT/SGPT): 17 U/L (01-04-23 @ 00:32)  Alanine Aminotransferase (ALT/SGPT): 14 U/L (01-03-23 @ 09:16)  Alanine Aminotransferase (ALT/SGPT): 24 U/L (01-03-23 @ 00:41)  Aspartate Aminotransferase (AST/SGOT): 42 U/L (01-04-23 @ 00:32)  Aspartate Aminotransferase (AST/SGOT): 27 U/L (01-03-23 @ 09:16)  Aspartate Aminotransferase (AST/SGOT): 30 U/L (01-03-23 @ 00:41)  Bilirubin Total, Serum: 0.3 mg/dL (01-04-23 @ 00:32)  Bilirubin Total, Serum: 0.3 mg/dL (01-03-23 @ 09:16)  Bilirubin Total, Serum: 0.4 mg/dL (01-03-23 @ 00:41)  
INFECTIOUS DISEASES AND INTERNAL MEDICINE at [place]  =======================================================  Choco Dowell MD  Diplomates American Board of Internal Medicine and Infectious Diseases  Telephone [phone]  Fax            [phone]  =======================================================    RAYRAY RODRIGUEZ [id number]    Follow up: rsv fevers    Allergies:  aspirin (Short breath)  Avelox (Short breath; Pruritus)  cefepime (Anaphylaxis)  codeine (Short breath)  Dilaudid (Short breath)  iodine (Short breath; Swelling)  penicillin (Anaphylaxis)  shellfish (Anaphylaxis)  tetanus toxoid (Short breath)  Valium (Short breath)      Medications:  albuterol    90 MICROgram(s) HFA Inhaler 2 Puff(s) Inhalation every 6 hours  apixaban 5 milliGRAM(s) Oral every 12 hours  collagenase Ointment 1 Application(s) Topical two times a day  dextrose 5%. 1000 milliLiter(s) IV Continuous <Continuous>  dextrose 5%. 1000 milliLiter(s) IV Continuous <Continuous>  dextrose 50% Injectable 25 Gram(s) IV Push once  dextrose 50% Injectable 12.5 Gram(s) IV Push once  dextrose 50% Injectable 25 Gram(s) IV Push once  dextrose Oral Gel 15 Gram(s) Oral once PRN  fluconAZOLE IVPB 400 milliGRAM(s) IV Intermittent every 24 hours  glucagon  Injectable 1 milliGRAM(s) IntraMuscular once  hydrocortisone sodium succinate Injectable 50 milliGRAM(s) IV Push every 8 hours  influenza  Vaccine (HIGH DOSE) 0.7 milliLiter(s) IntraMuscular once  insulin lispro (ADMELOG) corrective regimen sliding scale   SubCutaneous three times a day before meals  lactulose Syrup 10 Gram(s) Oral two times a day PRN  magnesium oxide 400 milliGRAM(s) Oral every 8 hours  meropenem Injectable 1000 milliGRAM(s) IV Push every 8 hours  mexiletine 200 milliGRAM(s) Oral every 8 hours  polyethylene glycol 3350 17 Gram(s) Oral two times a day  sodium chloride 0.9%. 1000 milliLiter(s) IV Continuous <Continuous>  tiotropium 2.5 MICROgram(s) Inhaler 2 Puff(s) Inhalation daily  vancomycin  IVPB 500 milliGRAM(s) IV Intermittent every 12 hours    SOCIAL       FAMILY   FAMILY HISTORY:  Family history of asthma    Family history of breast cancer (Sibling)    Family history of diabetes mellitus type II      REVIEW OF SYSTEMS:  CONSTITUTIONAL:  No Fever or chills  HEENT:   No diplopia or blurred vision.  No earache, sore throat or runny nose.  CARDIOVASCULAR:  No pressure, squeezing, strangling, tightness, heaviness or aching about the chest, neck, axilla or epigastrium.  RESPIRATORY:    cough, , PND or orthopnea.  GASTROINTESTINAL:  No nausea, vomiting or diarrhea.  GENITOURINARY:  No dysuria, frequency or urgency. No Blood in urine  MUSCULOSKELETAL:   moves all joints  SKIN:  No change in skin, hair or nails.  NEUROLOGIC:  r weakness.              Physical Exam:  ICU Vital Signs Last 24 Hrs  T(C): 37 (03 Jan 2023 19:45), Max: 37 (03 Jan 2023 19:45)  T(F): 98.6 (03 Jan 2023 19:45), Max: 98.6 (03 Jan 2023 19:45)  HR: 81 (04 Jan 2023 08:06) (76 - 116)  BP: 133/60 (04 Jan 2023 08:06) (90/49 - 133/60)  BP(mean): 85 (03 Jan 2023 23:47) (85 - 85)  ABP: --  ABP(mean): --  RR: 20 (04 Jan 2023 08:06) (20 - 21)  SpO2: 98% (04 Jan 2023 08:06) (92% - 98%)    O2 Parameters below as of 03 Jan 2023 23:47  Patient On (Oxygen Delivery Method): room air          GEN: NAD,   HEENT: normocephalic and atraumatic. EOMI. CEZAR.    NECK: Supple. No carotid bruits.  No lymphadenopathy or thyromegaly.  LUNGS: COARSE BREATH SOUNDS   HEART: Regular rate and rhythm without murmur.  ABDOMEN: Soft, nontender, and nondistended.  Positive bowel sounds.    : No CVA tenderness  EXTREMITIES: Without any cyanosis, clubbing, rash, lesions or edema.  MSK: no joint swelling  NEUROLOGIC:  AWAKE ALERT         Labs:  Vitals:  ============  T(F): 98.6 (03 Jan 2023 19:45), Max: 98.6 (03 Jan 2023 19:45)  HR: 81 (04 Jan 2023 08:06)  BP: 133/60 (04 Jan 2023 08:06)  RR: 20 (04 Jan 2023 08:06)  SpO2: 98% (04 Jan 2023 08:06) (92% - 98%)  temp max in last 48H T(F): , Max: 100.9 (01-03-23 @ 00:24)    =======================================================  Current Antibiotics:  fluconAZOLE IVPB 400 milliGRAM(s) IV Intermittent every 24 hours  meropenem Injectable 1000 milliGRAM(s) IV Push every 8 hours  vancomycin  IVPB 500 milliGRAM(s) IV Intermittent every 12 hours    Other medications:  albuterol    90 MICROgram(s) HFA Inhaler 2 Puff(s) Inhalation every 6 hours  apixaban 5 milliGRAM(s) Oral every 12 hours  collagenase Ointment 1 Application(s) Topical two times a day  dextrose 5%. 1000 milliLiter(s) IV Continuous <Continuous>  dextrose 5%. 1000 milliLiter(s) IV Continuous <Continuous>  dextrose 50% Injectable 25 Gram(s) IV Push once  dextrose 50% Injectable 12.5 Gram(s) IV Push once  dextrose 50% Injectable 25 Gram(s) IV Push once  glucagon  Injectable 1 milliGRAM(s) IntraMuscular once  hydrocortisone sodium succinate Injectable 50 milliGRAM(s) IV Push every 8 hours  influenza  Vaccine (HIGH DOSE) 0.7 milliLiter(s) IntraMuscular once  insulin lispro (ADMELOG) corrective regimen sliding scale   SubCutaneous three times a day before meals  magnesium oxide 400 milliGRAM(s) Oral every 8 hours  mexiletine 200 milliGRAM(s) Oral every 8 hours  polyethylene glycol 3350 17 Gram(s) Oral two times a day  sodium chloride 0.9%. 1000 milliLiter(s) IV Continuous <Continuous>  tiotropium 2.5 MICROgram(s) Inhaler 2 Puff(s) Inhalation daily      =======================================================  Labs:                        11.6   11.64 )-----------( 163      ( 04 Jan 2023 00:32 )             39.4     01-04    141  |  108  |  11.4  ----------------------------<  166<H>  3.8   |  19.0<L>  |  0.62    Ca    8.1<L>      04 Jan 2023 00:32    TPro  5.8<L>  /  Alb  2.6<L>  /  TBili  0.3<L>  /  DBili  x   /  AST  42<H>  /  ALT  17  /  AlkPhos  80  01-04      Culture - Urine (collected 01-03-23 @ 03:18)  Source: Clean Catch None    Culture - Blood (collected 01-03-23 @ 00:41)  Source: .Blood Blood-Peripheral      Creatinine, Serum: 0.62 mg/dL (01-04-23 @ 00:32)  Creatinine, Serum: 0.77 mg/dL (01-03-23 @ 09:16)  Creatinine, Serum: 1.08 mg/dL (01-03-23 @ 00:41)            WBC Count: 11.64 K/uL (01-04-23 @ 00:32)  WBC Count: 11.86 K/uL (01-03-23 @ 09:16)  WBC Count: 12.78 K/uL (01-03-23 @ 00:41)    SARS-CoV-2 Result: NotDetec (01-03-23 @ 00:10)  SARS-CoV-2: Detected (12-14-22 @ 10:56)      Alkaline Phosphatase, Serum: 80 U/L (01-04-23 @ 00:32)  Alkaline Phosphatase, Serum: 88 U/L (01-03-23 @ 09:16)  Alkaline Phosphatase, Serum: 112 U/L (01-03-23 @ 00:41)  Alanine Aminotransferase (ALT/SGPT): 17 U/L (01-04-23 @ 00:32)  Alanine Aminotransferase (ALT/SGPT): 14 U/L (01-03-23 @ 09:16)  Alanine Aminotransferase (ALT/SGPT): 24 U/L (01-03-23 @ 00:41)  Aspartate Aminotransferase (AST/SGOT): 42 U/L (01-04-23 @ 00:32)  Aspartate Aminotransferase (AST/SGOT): 27 U/L (01-03-23 @ 09:16)  Aspartate Aminotransferase (AST/SGOT): 30 U/L (01-03-23 @ 00:41)  Bilirubin Total, Serum: 0.3 mg/dL (01-04-23 @ 00:32)  Bilirubin Total, Serum: 0.3 mg/dL (01-03-23 @ 09:16)  Bilirubin Total, Serum: 0.4 mg/dL (01-03-23 @ 00:41)  
Sepsis    HPI:  72 year old female with ?dementia - sent from [place] for Aortic aneurysm evaluation-y  BIBA from [facility] for worsening SOB since last night [place] -tx to [facility] ,seen by Ct Sx ->and no surgical intervention advised  significant medical hx includes but is not limited to adrenal insufficiency, aortic insufficiency, asthma, Afib on Eliquis, colorectal cancer s/p resection with colostomy bag, COPD, DM, DVT, pelvic fracture, rectal bleeding, seizure, TIA, tracheobronchomalacia, type I aortic dissection s/p Bentall procedure and hemiarch replacement 9/6 , candemia and MRSA  patient is unresponsive and hypoxic and hypotensive - intermittent opens eyes   currently lethargic- daughter at bedside - states she has never been told patient has dementia but she has been in and out of hospitals and rehabs for over 9 months now.- with decubitus ulcers on legs elbow and back- daughter has pictures- states mom has been not really ambulating and not been improving for several months, but does get better neurologically and can hold a conversation at baseline (03 Jan 2023 15:34)    Interval History:  Patient was seen and examined at bedside around 9:45 am and again around 1 pm.  Not happy as she was woken up last night at 2 am for dressing change.   Breathing and cough are much better.   Denies chest pain, palpitations, shortness of breath, nausea, vomiting or abdominal pain.  Tolerating PO.     ROS:  As per interval history otherwise unremarkable.    PHYSICAL EXAM:  Vital Signs  T(C): 36.7 (08 Jan 2023 11:48), Max: 37.1 (07 Jan 2023 16:29)  T(F): 98.1 (08 Jan 2023 11:48), Max: 98.7 (07 Jan 2023 16:29)  HR: 82 (08 Jan 2023 11:48) (67 - 82)  BP: 116/81 (08 Jan 2023 11:48) (116/81 - 154/87)  RR: 18 (08 Jan 2023 11:48) (16 - 18)  SpO2: 96% (08 Jan 2023 11:48) (94% - 98%)  Parameters below as of 08 Jan 2023 11:48  Patient On (Oxygen Delivery Method): room air  General: Elderly female sitting in bed comfortably. No acute distress  HEENT: EOMI. Clear conjunctivae. Moist mucus membrane.    Neck: Supple.   Chest: Good air entry. No wheezing, rales or rhonchi. No chest wall tenderness.  Heart: Normal S1 & S2. RRR.   Abdomen: Non distended. Soft. Non-tender. + BS. Colostomy in place.   Ext: No pedal edema. No calf tenderness. Ulcer on left heel and right elbow.   Back: Wound on lower back. No signs of infection.   Neuro: Active and alert. No focal deficit. No speech disorder  Skin: Warm and Dry  Psychiatry: Normal mood and affect    I&O's Summary    07 Jan 2023 07:01  -  08 Jan 2023 07:00  --------------------------------------------------------  IN: 0 mL / OUT: 1350 mL / NET: -1350 mL    LABS:  CAPILLARY BLOOD GLUCOSE  POCT Blood Glucose.: 199 mg/dL (08 Jan 2023 11:55)  POCT Blood Glucose.: 236 mg/dL (08 Jan 2023 09:23)  POCT Blood Glucose.: 312 mg/dL (07 Jan 2023 21:41)  POCT Blood Glucose.: 169 mg/dL (07 Jan 2023 18:18)                      10.1   7.28  )-----------( 203      ( 07 Jan 2023 06:15 )             33.8     Blood Culture: 01-05 @ 10:15  Organism --  Gram Stain Blood -- Gram Stain --  Specimen Source .Blood Blood  Culture-Blood --    01-05 @ 10:10  Organism --  Gram Stain Blood -- Gram Stain --  Specimen Source .Blood Blood  Culture-Blood --    RADIOLOGY & ADDITIONAL STUDIES:  Reviewed     MEDICATIONS  (STANDING):  acetylcysteine 20%  Inhalation 4 milliLiter(s) Inhalation every 12 hours  albuterol/ipratropium for Nebulization 3 milliLiter(s) Nebulizer every 6 hours  apixaban 5 milliGRAM(s) Oral every 12 hours  collagenase Ointment 1 Application(s) Topical two times a day  collagenase Ointment 1 Application(s) Topical daily  dextrose 5%. 1000 milliLiter(s) (100 mL/Hr) IV Continuous <Continuous>  dextrose 5%. 1000 milliLiter(s) (50 mL/Hr) IV Continuous <Continuous>  dextrose 50% Injectable 25 Gram(s) IV Push once  dextrose 50% Injectable 12.5 Gram(s) IV Push once  dextrose 50% Injectable 25 Gram(s) IV Push once  fluconAZOLE IVPB 400 milliGRAM(s) IV Intermittent every 24 hours  glucagon  Injectable 1 milliGRAM(s) IntraMuscular once  hydrocortisone 25 milliGRAM(s) Oral every 12 hours  influenza  Vaccine (HIGH DOSE) 0.7 milliLiter(s) IntraMuscular once  insulin glargine Injectable (LANTUS) 22 Unit(s) SubCutaneous at bedtime  insulin lispro (ADMELOG) corrective regimen sliding scale   SubCutaneous Before meals and at bedtime  insulin lispro Injectable (ADMELOG) 2 Unit(s) SubCutaneous three times a day before meals  magnesium oxide 400 milliGRAM(s) Oral every 8 hours  meropenem Injectable 1000 milliGRAM(s) IV Push every 8 hours  mexiletine 200 milliGRAM(s) Oral every 8 hours  pantoprazole    Tablet 40 milliGRAM(s) Oral daily  polyethylene glycol 3350 17 Gram(s) Oral two times a day  senna 2 Tablet(s) Oral at bedtime  tamsulosin 0.4 milliGRAM(s) Oral at bedtime  tiotropium 2.5 MICROgram(s) Inhaler 2 Puff(s) Inhalation daily  trimethoprim  160 mG/sulfamethoxazole 800 mG 1 Tablet(s) Oral daily    MEDICATIONS  (PRN):  acetaminophen     Tablet .. 650 milliGRAM(s) Oral every 6 hours PRN Temp greater or equal to 38C (100.4F), Mild Pain (1 - 3), Moderate Pain (4 - 6)  dextrose Oral Gel 15 Gram(s) Oral once PRN Blood Glucose LESS THAN 70 milliGRAM(s)/deciliter  lactulose Syrup 10 Gram(s) Oral two times a day PRN constipation  ondansetron Injectable 4 milliGRAM(s) IV Push every 6 hours PRN Nausea and/or Vomiting              
INFECTIOUS DISEASES AND INTERNAL MEDICINE at [place]  =======================================================  Choco Dowell MD  Diplomates American Board of Internal Medicine and Infectious Diseases  Telephone [phone]  Fax            [phone]  =======================================================    RAYRAY RODRIGUEZ [id number]    Follow up: rsv fevers    Allergies:  aspirin (Short breath)  Avelox (Short breath; Pruritus)  cefepime (Anaphylaxis)  codeine (Short breath)  Dilaudid (Short breath)  iodine (Short breath; Swelling)  penicillin (Anaphylaxis)  shellfish (Anaphylaxis)  tetanus toxoid (Short breath)  Valium (Short breath)      Medications:  albuterol    90 MICROgram(s) HFA Inhaler 2 Puff(s) Inhalation every 6 hours  apixaban 5 milliGRAM(s) Oral every 12 hours  collagenase Ointment 1 Application(s) Topical two times a day  dextrose 5%. 1000 milliLiter(s) IV Continuous <Continuous>  dextrose 5%. 1000 milliLiter(s) IV Continuous <Continuous>  dextrose 50% Injectable 25 Gram(s) IV Push once  dextrose 50% Injectable 12.5 Gram(s) IV Push once  dextrose 50% Injectable 25 Gram(s) IV Push once  dextrose Oral Gel 15 Gram(s) Oral once PRN  fluconAZOLE IVPB 400 milliGRAM(s) IV Intermittent every 24 hours  glucagon  Injectable 1 milliGRAM(s) IntraMuscular once  hydrocortisone sodium succinate Injectable 50 milliGRAM(s) IV Push every 8 hours  influenza  Vaccine (HIGH DOSE) 0.7 milliLiter(s) IntraMuscular once  insulin lispro (ADMELOG) corrective regimen sliding scale   SubCutaneous three times a day before meals  lactulose Syrup 10 Gram(s) Oral two times a day PRN  magnesium oxide 400 milliGRAM(s) Oral every 8 hours  meropenem Injectable 1000 milliGRAM(s) IV Push every 8 hours  mexiletine 200 milliGRAM(s) Oral every 8 hours  polyethylene glycol 3350 17 Gram(s) Oral two times a day  sodium chloride 0.9%. 1000 milliLiter(s) IV Continuous <Continuous>  tiotropium 2.5 MICROgram(s) Inhaler 2 Puff(s) Inhalation daily  vancomycin  IVPB 500 milliGRAM(s) IV Intermittent every 12 hours    SOCIAL       FAMILY   FAMILY HISTORY:  Family history of asthma    Family history of breast cancer (Sibling)    Family history of diabetes mellitus type II      REVIEW OF SYSTEMS:  CONSTITUTIONAL:  No Fever or chills  HEENT:   No diplopia or blurred vision.  No earache, sore throat or runny nose.  CARDIOVASCULAR:  No pressure, squeezing, strangling, tightness, heaviness or aching about the chest, neck, axilla or epigastrium.  RESPIRATORY:    cough, , PND or orthopnea.  GASTROINTESTINAL:  No nausea, vomiting or diarrhea.  GENITOURINARY:  No dysuria, frequency or urgency. No Blood in urine  MUSCULOSKELETAL:   moves all joints  SKIN:  No change in skin, hair or nails.  NEUROLOGIC:  r weakness.              Physical Exam:  IC Vital Signs Last 24 Hrs  T(C): 36.6 (06 Jan 2023 10:06), Max: 36.6 (05 Jan 2023 16:09)  T(F): 97.9 (06 Jan 2023 10:06), Max: 97.9 (05 Jan 2023 16:09)  HR: 63 (06 Jan 2023 10:06) (63 - 79)  BP: 138/81 (06 Jan 2023 10:06) (136/77 - 146/84)  BP(mean): --  RR: 18 (06 Jan 2023 10:06) (18 - 18)  SpO2: 97% (06 Jan 2023 10:06) (96% - 97%)    Parameters below as of 06 Jan 2023 10:06  Patient On (Oxygen Delivery Method): room air        O2 Parameters below as of 03 Jan 2023 23:47  Patient On (Oxygen Delivery Method): room air          GEN: NAD,   HEENT: normocephalic and atraumatic. EOMI. CEZAR.    NECK: Supple. No carotid bruits.  No lymphadenopathy or thyromegaly.  LUNGS: COARSE BREATH SOUNDS   HEART: Regular rate and rhythm without murmur.  ABDOMEN: Soft, nontender, and nondistended.  Positive bowel sounds.    : No CVA tenderness  EXTREMITIES: Without any cyanosis, clubbing, rash, lesions or edema.  MSK: no joint swelling  NEUROLOGIC:  AWAKE ALERT         Labs:  Vitals:  ===   =======================================================  Current Antibiotics:  fluconAZOLE IVPB 400 milliGRAM(s) IV Intermittent every 24 hours  meropenem Injectable 1000 milliGRAM(s) IV Push every 8 hours  vancomycin  IVPB 500 milliGRAM(s) IV Intermittent every 12 hours    Other medications:  albuterol    90 MICROgram(s) HFA Inhaler 2 Puff(s) Inhalation every 6 hours  apixaban 5 milliGRAM(s) Oral every 12 hours  collagenase Ointment 1 Application(s) Topical two times a day  dextrose 5%. 1000 milliLiter(s) IV Continuous <Continuous>  dextrose 5%. 1000 milliLiter(s) IV Continuous <Continuous>  dextrose 50% Injectable 25 Gram(s) IV Push once  dextrose 50% Injectable 12.5 Gram(s) IV Push once  dextrose 50% Injectable 25 Gram(s) IV Push once  glucagon  Injectable 1 milliGRAM(s) IntraMuscular once  hydrocortisone sodium succinate Injectable 50 milliGRAM(s) IV Push every 8 hours  influenza  Vaccine (HIGH DOSE) 0.7 milliLiter(s) IntraMuscular once  insulin lispro (ADMELOG) corrective regimen sliding scale   SubCutaneous three times a day before meals  magnesium oxide 400 milliGRAM(s) Oral every 8 hours  mexiletine 200 milliGRAM(s) Oral every 8 hours  polyethylene glycol 3350 17 Gram(s) Oral two times a day  sodium chloride 0.9%. 1000 milliLiter(s) IV Continuous <Continuous>  tiotropium 2.5 MICROgram(s) Inhaler 2 Puff(s) Inhalation daily      =======================================================  Labs:                                            9.1    6.60  )-----------( 175      ( 06 Jan 2023 05:30 )             30.0   01-06    142  |  109<H>  |  9.5  ----------------------------<  104<H>  3.9   |  23.0  |  0.40<L>    Ca    8.4      06 Jan 2023 05:30  Mg     1.8     01-06    TPro  5.3<L>  /  Alb  2.8<L>  /  TBili  0.2<L>  /  DBili  x   /  AST  28  /  ALT  19  /  AlkPhos  63  01-05    Source: Clean Catch None    Culture - Blood (collected 01-03-23 @ 00:41)  Source: .Blood Blood-Peripheral      Creatinine, Serum: 0.62 mg/dL (01-04-23 @ 00:32)  Creatinine, Serum: 0.77 mg/dL (01-03-23 @ 09:16)  Creatinine, Serum: 1.08 mg/dL (01-03-23 @ 00:41)            WBC Count: 11.64 K/uL (01-04-23 @ 00:32)  WBC Count: 11.86 K/uL (01-03-23 @ 09:16)  WBC Count: 12.78 K/uL (01-03-23 @ 00:41)    SARS-CoV-2 Result: NotDetec (01-03-23 @ 00:10)  SARS-CoV-2: Detected (12-14-22 @ 10:56)      Alkaline Phosphatase, Serum: 80 U/L (01-04-23 @ 00:32)  Alkaline Phosphatase, Serum: 88 U/L (01-03-23 @ 09:16)  Alkaline Phosphatase, Serum: 112 U/L (01-03-23 @ 00:41)  Alanine Aminotransferase (ALT/SGPT): 17 U/L (01-04-23 @ 00:32)  Alanine Aminotransferase (ALT/SGPT): 14 U/L (01-03-23 @ 09:16)  Alanine Aminotransferase (ALT/SGPT): 24 U/L (01-03-23 @ 00:41)  Aspartate Aminotransferase (AST/SGOT): 42 U/L (01-04-23 @ 00:32)  Aspartate Aminotransferase (AST/SGOT): 27 U/L (01-03-23 @ 09:16)  Aspartate Aminotransferase (AST/SGOT): 30 U/L (01-03-23 @ 00:41)  Bilirubin Total, Serum: 0.3 mg/dL (01-04-23 @ 00:32)  Bilirubin Total, Serum: 0.3 mg/dL (01-03-23 @ 09:16)  Bilirubin Total, Serum: 0.4 mg/dL (01-03-23 @ 00:41)  
                          Patient: RAYRAY RODRIGUEZ [id number] 72y Female                            Hospitalist Attending Note    No complaints.  Tolerating po intake.  No cough / SOB.   No additional complaints.     ____________________PHYSICAL EXAM:  GENERAL:  NAD Alert and Oriented x 3 pleasant.   HEENT: NCAT  CARDIOVASCULAR:  S1, S2  LUNGS: CTAB  ABDOMEN:  soft, (-) tenderness, (-) distension, (+) bowel sounds, (-) guarding, (-) rebound (-) rigidity  EXTREMITIES:  no cyanosis / clubbing / edema.   ____________________    VITALS:  Vital Signs Last 24 Hrs  T(C): 36.7 (12 Jan 2023 09:18), Max: 37 (11 Jan 2023 22:11)  T(F): 98 (12 Jan 2023 09:18), Max: 98.6 (11 Jan 2023 22:11)  HR: 76 (12 Jan 2023 09:18) (70 - 90)  BP: 122/76 (12 Jan 2023 09:18) (104/68 - 125/82)  BP(mean): --  RR: 18 (12 Jan 2023 09:18) (18 - 18)  SpO2: 93% (12 Jan 2023 09:18) (93% - 97%)    Parameters below as of 12 Jan 2023 09:18  Patient On (Oxygen Delivery Method): room air     Daily     Daily   CAPILLARY BLOOD GLUCOSE      POCT Blood Glucose.: 131 mg/dL (12 Jan 2023 08:06)  POCT Blood Glucose.: 152 mg/dL (11 Jan 2023 22:17)  POCT Blood Glucose.: 227 mg/dL (11 Jan 2023 17:42)  POCT Blood Glucose.: 218 mg/dL (11 Jan 2023 13:57)    I&O's Summary    11 Jan 2023 07:01  -  12 Jan 2023 07:00  --------------------------------------------------------  IN: 0 mL / OUT: 825 mL / NET: -825 mL        LABS:    01-11    138  |  102  |  8.6  ----------------------------<  189<H>  4.5   |  28.0  |  0.49<L>    Ca    8.7      11 Jan 2023 06:32                    MEDICATIONS:  acetaminophen     Tablet .. 650 milliGRAM(s) Oral every 6 hours PRN  albuterol/ipratropium for Nebulization 3 milliLiter(s) Nebulizer every 6 hours  apixaban 5 milliGRAM(s) Oral every 12 hours  collagenase Ointment 1 Application(s) Topical two times a day  collagenase Ointment 1 Application(s) Topical daily  dextrose 5%. 1000 milliLiter(s) IV Continuous <Continuous>  dextrose 5%. 1000 milliLiter(s) IV Continuous <Continuous>  dextrose 50% Injectable 25 Gram(s) IV Push once  dextrose 50% Injectable 12.5 Gram(s) IV Push once  dextrose 50% Injectable 25 Gram(s) IV Push once  dextrose Oral Gel 15 Gram(s) Oral once PRN  fluconAZOLE IVPB 400 milliGRAM(s) IV Intermittent every 24 hours  glucagon  Injectable 1 milliGRAM(s) IntraMuscular once  hydrocortisone 25 milliGRAM(s) Oral daily  influenza  Vaccine (HIGH DOSE) 0.7 milliLiter(s) IntraMuscular once  insulin glargine Injectable (LANTUS) 23 Unit(s) SubCutaneous at bedtime  insulin lispro (ADMELOG) corrective regimen sliding scale   SubCutaneous Before meals and at bedtime  insulin lispro Injectable (ADMELOG) 2 Unit(s) SubCutaneous three times a day before meals  lactulose Syrup 10 Gram(s) Oral two times a day PRN  magnesium oxide 400 milliGRAM(s) Oral every 8 hours  mexiletine 200 milliGRAM(s) Oral every 8 hours  ondansetron Injectable 4 milliGRAM(s) IV Push every 6 hours PRN  pantoprazole    Tablet 40 milliGRAM(s) Oral daily  polyethylene glycol 3350 17 Gram(s) Oral two times a day  senna 2 Tablet(s) Oral at bedtime  tamsulosin 0.4 milliGRAM(s) Oral at bedtime  tiotropium 2.5 MICROgram(s) Inhaler 2 Puff(s) Inhalation daily  trimethoprim  160 mG/sulfamethoxazole 800 mG 1 Tablet(s) Oral daily    
                          Patient: RAYRAY RODRIGUEZ [id number] 72y Female                            Hospitalist Attending Note    No complaints.  Tolerating po intake.  No cough / SOB.   No additional complaints.     ____________________PHYSICAL EXAM:  GENERAL:  NAD Alert and Oriented x 3 pleasant.   HEENT: NCAT  CARDIOVASCULAR:  S1, S2  LUNGS: CTAB  ABDOMEN:  soft, (-) tenderness, (-) distension, (+) bowel sounds, (-) guarding, (-) rebound (-) rigidity  EXTREMITIES:  no cyanosis / clubbing / edema.   ____________________       VITALS:  Vital Signs Last 24 Hrs  T(C): 36.5 (11 Jan 2023 05:06), Max: 37 (10 Facundo 2023 15:44)  T(F): 97.7 (11 Jan 2023 05:06), Max: 98.6 (10 Facundo 2023 15:44)  HR: 81 (11 Jan 2023 05:06) (81 - 103)  BP: 116/72 (11 Jan 2023 05:06) (105/67 - 116/72)  BP(mean): --  RR: 18 (11 Jan 2023 05:06) (18 - 18)  SpO2: 96% (11 Jan 2023 05:06) (91% - 98%)    Parameters below as of 11 Jan 2023 05:06  Patient On (Oxygen Delivery Method): room air     Daily     Daily   CAPILLARY BLOOD GLUCOSE      POCT Blood Glucose.: 211 mg/dL (11 Jan 2023 09:05)  POCT Blood Glucose.: 183 mg/dL (10 Facundo 2023 23:10)  POCT Blood Glucose.: 180 mg/dL (10 Facundo 2023 18:10)  POCT Blood Glucose.: 232 mg/dL (10 Facundo 2023 13:16)    I&O's Summary    10 Facundo 2023 07:01  -  11 Jan 2023 07:00  --------------------------------------------------------  IN: 0 mL / OUT: 350 mL / NET: -350 mL        HISTORY:  PAST MEDICAL & SURGICAL HISTORY:  Atrial fibrillation  paroxysmal, on eliquis      Diabetes  Type 2      COPD (chronic obstructive pulmonary disease)      Adrenal insufficiency  Medrol daily for over 50 years      Aortic insufficiency  moderate AR on echo 5/3/2018      Pelvic fracture      Asthma      Tracheobronchomalacia  diagnosed 2015, s/p bronchial thermoplasty 2016 (Dr Zapien); recent bronchoscopy 6/5/2018 revealed no evidence of tracheobronchomalacia in trachea or bronchial tubes      Colorectal cancer  4/2018- last treatment , chemo and radiation      Rectal bleeding      Seizure  x 1 1/7/18      DVT (deep venous thrombosis)  15-20 years ago, took coumadin      TIA (transient ischemic attack)  multiple, last 5 years ago - presents as right-sided weakness      History of partial hysterectomy  30 years ago - fibroids      H/O total knee replacement, bilateral  5 years ago      History of sinus surgery  multiple sinus surgeries      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      H/O pelvic surgery  5 years ago - s/p fracture      History of tracheomalacia  2015 - attempted tracheal stenting ([facility])- course complicated by obstruction, respiratory failure, multiple CPR attempts -  stent discontinued; 10/20/2016 Tracheobronchoplasty (Prolene Mesh) performed at [facility] by Dr Zapien      S/P bronchoscopy  6/5/2018 - [place] (Dr Zapien) no evidence of tracheobronchomalacia in trachea or bronchial tubes      Rectal bleeding  exam under anesthesia ([facility]) 2/2018      Allergies    aspirin (Short breath)  Avelox (Short breath; Pruritus)  cefepime (Anaphylaxis)  codeine (Short breath)  Dilaudid (Short breath)  iodine (Short breath; Swelling)  penicillin (Anaphylaxis)  shellfish (Anaphylaxis)  tetanus toxoid (Short breath)  Valium (Short breath)    Intolerances       LABS:    01-11    138  |  102  |  8.6  ----------------------------<  189<H>  4.5   |  28.0  |  0.49<L>    Ca    8.7      11 Jan 2023 06:32                    MEDICATIONS:  MEDICATIONS  (STANDING):  albuterol/ipratropium for Nebulization 3 milliLiter(s) Nebulizer every 6 hours  apixaban 5 milliGRAM(s) Oral every 12 hours  collagenase Ointment 1 Application(s) Topical two times a day  collagenase Ointment 1 Application(s) Topical daily  dextrose 5%. 1000 milliLiter(s) (50 mL/Hr) IV Continuous <Continuous>  dextrose 5%. 1000 milliLiter(s) (100 mL/Hr) IV Continuous <Continuous>  dextrose 50% Injectable 25 Gram(s) IV Push once  dextrose 50% Injectable 12.5 Gram(s) IV Push once  dextrose 50% Injectable 25 Gram(s) IV Push once  fluconAZOLE IVPB 400 milliGRAM(s) IV Intermittent every 24 hours  glucagon  Injectable 1 milliGRAM(s) IntraMuscular once  hydrocortisone 25 milliGRAM(s) Oral daily  influenza  Vaccine (HIGH DOSE) 0.7 milliLiter(s) IntraMuscular once  insulin glargine Injectable (LANTUS) 23 Unit(s) SubCutaneous at bedtime  insulin lispro (ADMELOG) corrective regimen sliding scale   SubCutaneous Before meals and at bedtime  insulin lispro Injectable (ADMELOG) 2 Unit(s) SubCutaneous three times a day before meals  magnesium oxide 400 milliGRAM(s) Oral every 8 hours  meropenem Injectable 1000 milliGRAM(s) IV Push every 8 hours  mexiletine 200 milliGRAM(s) Oral every 8 hours  pantoprazole    Tablet 40 milliGRAM(s) Oral daily  polyethylene glycol 3350 17 Gram(s) Oral two times a day  senna 2 Tablet(s) Oral at bedtime  tamsulosin 0.4 milliGRAM(s) Oral at bedtime  tiotropium 2.5 MICROgram(s) Inhaler 2 Puff(s) Inhalation daily  trimethoprim  160 mG/sulfamethoxazole 800 mG 1 Tablet(s) Oral daily    MEDICATIONS  (PRN):  acetaminophen     Tablet .. 650 milliGRAM(s) Oral every 6 hours PRN Temp greater or equal to 38C (100.4F), Mild Pain (1 - 3), Moderate Pain (4 - 6)  dextrose Oral Gel 15 Gram(s) Oral once PRN Blood Glucose LESS THAN 70 milliGRAM(s)/deciliter  lactulose Syrup 10 Gram(s) Oral two times a day PRN constipation  ondansetron Injectable 4 milliGRAM(s) IV Push every 6 hours PRN Nausea and/or Vomiting  
HPI  Pt is a 73yo F admitted to [facility] for aspiration pna and pyelonephritis.   Pt was seen and examined at bedside. No overnight complaints. Hypoglycemia noted this am.     Vital Signs Last 24 Hrs  T(C): 36.8 (09 Jan 2023 12:29), Max: 36.8 (09 Jan 2023 04:32)  T(F): 98.2 (09 Jan 2023 12:29), Max: 98.2 (09 Jan 2023 04:32)  HR: 84 (09 Jan 2023 12:29) (84 - 92)  BP: 116/74 (09 Jan 2023 12:29) (101/60 - 132/82)  BP(mean): --  RR: 18 (09 Jan 2023 12:29) (18 - 18)  SpO2: 93% (09 Jan 2023 12:29) (93% - 97%)    Parameters below as of 09 Jan 2023 12:29  Patient On (Oxygen Delivery Method): room air        I&O's Summary      CAPILLARY BLOOD GLUCOSE      POCT Blood Glucose.: 114 mg/dL (09 Jan 2023 09:27)  POCT Blood Glucose.: 69 mg/dL (09 Jan 2023 08:53)  POCT Blood Glucose.: 55 mg/dL (09 Jan 2023 08:50)  POCT Blood Glucose.: 241 mg/dL (08 Jan 2023 22:50)  POCT Blood Glucose.: 149 mg/dL (08 Jan 2023 18:49)      PHYSICAL EXAM:    Constitutional: NAD,   HEENT: PERR, EOMI,   Neck: Soft and supple,   Respiratory: Breath sounds are clear bilaterally,    Cardiovascular: S1 and S2,   Gastrointestinal: Bowel Sounds present, soft, nontender, Colostomy in place   Extremities: No peripheral edema  Vascular: 2+ peripheral pulses  Neurological: A/O x 3, no focal deficits  Musculoskeletal: 5/5 strength b/l upper and lower extremities  Skin: No rashes    MEDICATIONS:  MEDICATIONS  (STANDING):  albuterol/ipratropium for Nebulization 3 milliLiter(s) Nebulizer every 6 hours  apixaban 5 milliGRAM(s) Oral every 12 hours  collagenase Ointment 1 Application(s) Topical two times a day  collagenase Ointment 1 Application(s) Topical daily  dextrose 5%. 1000 milliLiter(s) (100 mL/Hr) IV Continuous <Continuous>  dextrose 5%. 1000 milliLiter(s) (50 mL/Hr) IV Continuous <Continuous>  dextrose 50% Injectable 25 Gram(s) IV Push once  dextrose 50% Injectable 12.5 Gram(s) IV Push once  dextrose 50% Injectable 25 Gram(s) IV Push once  fluconAZOLE IVPB 400 milliGRAM(s) IV Intermittent every 24 hours  glucagon  Injectable 1 milliGRAM(s) IntraMuscular once  hydrocortisone 25 milliGRAM(s) Oral daily  influenza  Vaccine (HIGH DOSE) 0.7 milliLiter(s) IntraMuscular once  insulin glargine Injectable (LANTUS) 25 Unit(s) SubCutaneous at bedtime  insulin lispro (ADMELOG) corrective regimen sliding scale   SubCutaneous Before meals and at bedtime  insulin lispro Injectable (ADMELOG) 2 Unit(s) SubCutaneous three times a day before meals  magnesium oxide 400 milliGRAM(s) Oral every 8 hours  meropenem Injectable 1000 milliGRAM(s) IV Push every 8 hours  mexiletine 200 milliGRAM(s) Oral every 8 hours  pantoprazole    Tablet 40 milliGRAM(s) Oral daily  polyethylene glycol 3350 17 Gram(s) Oral two times a day  potassium chloride  10 mEq/100 mL IVPB 10 milliEquivalent(s) IV Intermittent every 1 hour  senna 2 Tablet(s) Oral at bedtime  tamsulosin 0.4 milliGRAM(s) Oral at bedtime  tiotropium 2.5 MICROgram(s) Inhaler 2 Puff(s) Inhalation daily  trimethoprim  160 mG/sulfamethoxazole 800 mG 1 Tablet(s) Oral daily      LABS: All Labs Reviewed:                        10.4   7.48  )-----------( 208      ( 09 Jan 2023 07:25 )             34.6     01-09    142  |  102  |  11.0  ----------------------------<  45<LL>  2.9<LL>   |  33.0<H>  |  0.44<L>    Ca    8.5      09 Jan 2023 07:25  Mg     1.8     01-09            Blood Culture: 01-05 @ 10:15  Organism --  Gram Stain Blood -- Gram Stain --  Specimen Source .Blood Blood  Culture-Blood --    01-05 @ 10:10  Organism --  Gram Stain Blood -- Gram Stain --  Specimen Source .Blood Blood  Culture-Blood --        RADIOLOGY/EKG:    DVT PPX:    ADVANCED DIRECTIVE:    DISPOSITION:
Sepsis    HPI:  72 year old female with ?dementia - sent from [place] for Aortic aneurysm evaluation-y  BIBA from [facility] for worsening SOB since last night [place] -tx to [facility] ,seen by Ct Sx ->and no surgical intervention advised  significant medical hx includes but is not limited to adrenal insufficiency, aortic insufficiency, asthma, Afib on Eliquis, colorectal cancer s/p resection with colostomy bag, COPD, DM, DVT, pelvic fracture, rectal bleeding, seizure, TIA, tracheobronchomalacia, type I aortic dissection s/p Bentall procedure and hemiarch replacement 9/6 , candemia and MRSA  patient is unresponsive and hypoxic and hypotensive - intermittent opens eyes   currently lethargic- daughter at bedside - states she has never been told patient has dementia but she has been in and out of hospitals and rehabs for over 9 months now.- with decubitus ulcers on legs elbow and back- daughter has pictures- states mom has been not really ambulating and not been improving for several months, but does get better neurologically and can hold a conversation at baseline (03 Jan 2023 15:34)    Interval History:  Patient was seen and examined at bedside around 10:45 am.  Was having cough yesterday. Coughed up a lot of phlegm and now is feeling better.   Denies chest pain, palpitations, shortness of breath, nausea, vomiting or abdominal pain.  Tolerating PO.     ROS:  As per interval history otherwise unremarkable.    PHYSICAL EXAM:  Vital Signs   T(C): 36.8 (07 Jan 2023 11:22), Max: 36.8 (07 Jan 2023 11:22)  T(F): 98.2 (07 Jan 2023 11:22), Max: 98.2 (07 Jan 2023 11:22)  HR: 78 (07 Jan 2023 11:22) (62 - 82)  BP: 147/84 (07 Jan 2023 11:22) (147/84 - 158/89)  RR: 18 (07 Jan 2023 11:22) (18 - 18)  SpO2: 96% (07 Jan 2023 11:22) (95% - 98%)  Parameters below as of 07 Jan 2023 11:22  Patient On (Oxygen Delivery Method): room air  General: Elderly female sitting in bed comfortably. No acute distress  HEENT: EOMI. Clear conjunctivae. Moist mucus membrane.    Neck: Supple.   Chest: Good air entry. No wheezing, rales or rhonchi. No chest wall tenderness.  Heart: Normal S1 & S2. RRR.   Abdomen: Non distended. Soft. Non-tender. + BS. Colostomy in place.   Ext: No pedal edema. No calf tenderness. Ulcer on left heel and right elbow.   Back: Wound on lower back. No signs of infection.   Neuro: Active and alert. No focal deficit. No speech disorder  Skin: Warm and Dry  Psychiatry: Normal mood and affect    I&O's Summary    06 Jan 2023 07:01  -  07 Jan 2023 07:00  --------------------------------------------------------  IN: 0 mL / OUT: 1000 mL / NET: -1000 mL    07 Jan 2023 07:01  -  07 Jan 2023 14:59  --------------------------------------------------------  IN: 0 mL / OUT: 900 mL / NET: -900 mL    LABS:  CAPILLARY BLOOD GLUCOSE  POCT Blood Glucose.: 280 mg/dL (07 Jan 2023 11:52)  POCT Blood Glucose.: 167 mg/dL (07 Jan 2023 09:08)  POCT Blood Glucose.: 297 mg/dL (06 Jan 2023 22:49)  POCT Blood Glucose.: 210 mg/dL (06 Jan 2023 16:52)                      10.1   7.28  )-----------( 203      ( 07 Jan 2023 06:15 )             33.8     01-06    142  |  109<H>  |  9.5  ----------------------------<  104<H>  3.9   |  23.0  |  0.40<L>    Ca    8.4      06 Jan 2023 05:30  Mg     1.8     01-06    Blood Culture: 01-05 @ 10:15  Organism --  Gram Stain Blood -- Gram Stain --  Specimen Source .Blood Blood  Culture-Blood --    01-05 @ 10:10  Organism --  Gram Stain Blood -- Gram Stain --  Specimen Source .Blood Blood  Culture-Blood --    01-03 @ 09:50  Organism --  Gram Stain Blood -- Gram Stain --  Specimen Source Clean Catch Clean Catch (Midstream)  Culture-Blood --    01-03 @ 09:20  Organism Blood Culture PCR  Gram Stain Blood -- Gram Stain   Growth in aerobic bottle: Gram Positive Cocci in Clusters  Specimen Source .Blood Blood-Peripheral  Culture-Blood --    01-03 @ 09:16  Organism Staphylococcus epidermidis  Gram Stain Blood -- Gram Stain   Growth in anaerobic bottle: Gram Positive Cocci in Clusters  Specimen Source .Blood Blood-Peripheral  Culture-Blood --    01-03 @ 03:18  Organism Klebsiella pneumoniae  Gram Stain Blood -- Gram Stain --  Specimen Source Clean Catch None  Culture-Blood --    01-03 @ 01:31  Organism --  Gram Stain Blood -- Gram Stain --  Specimen Source .Blood None  Culture-Blood --    01-03 @ 00:41  Organism --  Gram Stain Blood -- Gram Stain --  Specimen Source .Blood Blood-Peripheral  Culture-Blood --    RADIOLOGY & ADDITIONAL STUDIES:  Reviewed     MEDICATIONS  (STANDING):  acetylcysteine 20%  Inhalation 4 milliLiter(s) Inhalation every 12 hours  albuterol/ipratropium for Nebulization 3 milliLiter(s) Nebulizer every 6 hours  apixaban 5 milliGRAM(s) Oral every 12 hours  collagenase Ointment 1 Application(s) Topical two times a day  dextrose 5%. 1000 milliLiter(s) (50 mL/Hr) IV Continuous <Continuous>  dextrose 5%. 1000 milliLiter(s) (100 mL/Hr) IV Continuous <Continuous>  dextrose 50% Injectable 25 Gram(s) IV Push once  dextrose 50% Injectable 12.5 Gram(s) IV Push once  dextrose 50% Injectable 25 Gram(s) IV Push once  fluconAZOLE IVPB 400 milliGRAM(s) IV Intermittent every 24 hours  glucagon  Injectable 1 milliGRAM(s) IntraMuscular once  hydrocortisone 25 milliGRAM(s) Oral every 12 hours  influenza  Vaccine (HIGH DOSE) 0.7 milliLiter(s) IntraMuscular once  insulin glargine Injectable (LANTUS) 20 Unit(s) SubCutaneous at bedtime  insulin lispro (ADMELOG) corrective regimen sliding scale   SubCutaneous Before meals and at bedtime  magnesium oxide 400 milliGRAM(s) Oral every 8 hours  meropenem Injectable 1000 milliGRAM(s) IV Push every 8 hours  mexiletine 200 milliGRAM(s) Oral every 8 hours  pantoprazole    Tablet 40 milliGRAM(s) Oral daily  polyethylene glycol 3350 17 Gram(s) Oral two times a day  senna 2 Tablet(s) Oral at bedtime  sodium chloride 0.9%. 1000 milliLiter(s) (70 mL/Hr) IV Continuous <Continuous>  tamsulosin 0.4 milliGRAM(s) Oral at bedtime  tiotropium 2.5 MICROgram(s) Inhaler 2 Puff(s) Inhalation daily  vancomycin  IVPB 750 milliGRAM(s) IV Intermittent every 12 hours    MEDICATIONS  (PRN):  acetaminophen     Tablet .. 650 milliGRAM(s) Oral every 6 hours PRN Temp greater or equal to 38C (100.4F), Mild Pain (1 - 3), Moderate Pain (4 - 6)  dextrose Oral Gel 15 Gram(s) Oral once PRN Blood Glucose LESS THAN 70 milliGRAM(s)/deciliter  lactulose Syrup 10 Gram(s) Oral two times a day PRN constipation  ondansetron Injectable 4 milliGRAM(s) IV Push every 6 hours PRN Nausea and/or Vomiting            
Sepsis    HPI:  72 year old female with ?dementia - sent from [place] for Aortic aneurysm evaluation-y  BIBA from [facility] for worsening SOB since last night [place] -tx to [facility] ,seen by Ct Sx ->and no surgical intervention advised  significant medical hx includes but is not limited to adrenal insufficiency, aortic insufficiency, asthma, Afib on Eliquis, colorectal cancer s/p resection with colostomy bag, COPD, DM, DVT, pelvic fracture, rectal bleeding, seizure, TIA, tracheobronchomalacia, type I aortic dissection s/p Bentall procedure and hemiarch replacement  , candemia and MRSA  patient is unresponsive and hypoxic and hypotensive - intermittent opens eyes   currently lethargic- daughter at bedside - states she has never been told patient has dementia but she has been in and out of hospitals and rehabs for over 9 months now.- with decubitus ulcers on legs elbow and back- daughter has pictures- states mom has been not really ambulating and not been improving for several months, but does get better neurologically and can hold a conversation at baseline (2023 15:34)    Interval History:  Patient was seen and examined at bedside around 11 am.  Awake and alert. Providing good information. Asking for water.   Coughed up some blood with sputum earlier today.   Denies chest pain, palpitations, shortness of breath, headache, dizziness, visual symptoms, nausea, vomiting or abdominal pain.    ROS:  As per interval history otherwise unremarkable.    PHYSICAL EXAM:  Vital Signs   T(C): 36.6 (2023 11:27), Max: 37 (2023 19:45)  T(F): 97.8 (2023 11:27), Max: 98.6 (2023 19:45)  HR: 78 (2023 11:27) (78 - 116)  BP: 134/72 (2023 11:27) (90/49 - 134/72)  BP(mean): 85 (2023 23:47) (85 - 85)  RR: 19 (2023 11:27) (19 - 21)  SpO2: 100% (2023 11:27) (92% - 100%)  Parameters below as of 2023 11:27  Patient On (Oxygen Delivery Method): room air  General: Elderly female sitting in bed comfortably. No acute distress  HEENT: EOMI. Clear conjunctivae. Moist mucus membrane. NGT in place.   Neck: Supple.   Chest: Good air entry. No wheezing, rales or rhonchi. No chest wall tenderness.  Heart: Normal S1 & S2. RRR.   Abdomen: Non distended. Soft. Non-tender. + BS. Colostomy in place.   : Amezcua's catheter in place.   Ext: No pedal edema. No calf tenderness   Neuro: Active and alert. No focal deficit. No speech disorder  Skin: Warm and Dry  Psychiatry: Normal mood and affect    I&O's Summary    2023 07:01  -  2023 07:00  --------------------------------------------------------  IN: 430 mL / OUT: 245 mL / NET: 185 mL    LABS:  CAPILLARY BLOOD GLUCOSE  POCT Blood Glucose.: 176 mg/dL (2023 12:15)  POCT Blood Glucose.: 167 mg/dL (2023 07:57)  POCT Blood Glucose.: 144 mg/dL (2023 16:33)                      11.6   11.64 )-----------( 163      ( 2023 00:32 )             39.4     01-04    141  |  108  |  11.4  ----------------------------<  166<H>  3.8   |  19.0<L>  |  0.62    Ca    8.1<L>      2023 00:32    TPro  5.8<L>  /  Alb  2.6<L>  /  TBili  0.3<L>  /  DBili  x   /  AST  42<H>  /  ALT  17  /  AlkPhos  80  -04    PT/INR - ( 2023 09:16 )   PT: 16.3 sec;   INR: 1.40 ratio       PTT - ( 2023 09:16 )  PTT:32.7 sec  Urinalysis Basic - ( 2023 09:50 )    Color: Yellow / Appearance: Clear / S.010 / pH: x  Gluc: x / Ketone: Small  / Bili: Negative / Urobili: Negative mg/dL   Blood: x / Protein: 15 / Nitrite: Negative   Leuk Esterase: Negative / RBC: 0-2 /HPF / WBC 0-2 /HPF   Sq Epi: x / Non Sq Epi: Occasional / Bacteria: Few    RADIOLOGY & ADDITIONAL STUDIES:  Reviewed     MEDICATIONS  (STANDING):  albuterol    90 MICROgram(s) HFA Inhaler 2 Puff(s) Inhalation every 6 hours  apixaban 5 milliGRAM(s) Oral every 12 hours  collagenase Ointment 1 Application(s) Topical two times a day  dextrose 5%. 1000 milliLiter(s) (100 mL/Hr) IV Continuous <Continuous>  dextrose 5%. 1000 milliLiter(s) (50 mL/Hr) IV Continuous <Continuous>  dextrose 50% Injectable 25 Gram(s) IV Push once  dextrose 50% Injectable 12.5 Gram(s) IV Push once  dextrose 50% Injectable 25 Gram(s) IV Push once  fluconAZOLE IVPB 400 milliGRAM(s) IV Intermittent every 24 hours  glucagon  Injectable 1 milliGRAM(s) IntraMuscular once  hydrocortisone sodium succinate Injectable 50 milliGRAM(s) IV Push every 8 hours  influenza  Vaccine (HIGH DOSE) 0.7 milliLiter(s) IntraMuscular once  insulin lispro (ADMELOG) corrective regimen sliding scale   SubCutaneous three times a day before meals  magnesium oxide 400 milliGRAM(s) Oral every 8 hours  meropenem Injectable 1000 milliGRAM(s) IV Push every 8 hours  mexiletine 200 milliGRAM(s) Oral every 8 hours  polyethylene glycol 3350 17 Gram(s) Oral two times a day  sodium chloride 0.9%. 1000 milliLiter(s) (70 mL/Hr) IV Continuous <Continuous>  tiotropium 2.5 MICROgram(s) Inhaler 2 Puff(s) Inhalation daily  vancomycin  IVPB 500 milliGRAM(s) IV Intermittent every 12 hours    MEDICATIONS  (PRN):  dextrose Oral Gel 15 Gram(s) Oral once PRN Blood Glucose LESS THAN 70 milliGRAM(s)/deciliter  lactulose Syrup 10 Gram(s) Oral two times a day PRN constipation      
HPI  Pt is a 71yo F admitted to [facility] for aspiration pna and pyelonephritis.   Pt was seen and examined at bedside. No overnight complaints.      Vital Signs Last 24 Hrs  T(C): 36.7 (10 Facundo 2023 10:30), Max: 36.9 (09 Jan 2023 17:11)  T(F): 98.1 (10 Facundo 2023 10:30), Max: 98.4 (09 Jan 2023 17:11)  HR: 99 (10 Facundo 2023 10:30) (75 - 99)  BP: 106/70 (10 Facundo 2023 10:30) (101/59 - 122/76)  BP(mean): --  RR: 18 (10 Facundo 2023 10:30) (18 - 18)  SpO2: 91% (10 Facundo 2023 10:30) (91% - 97%)    Parameters below as of 10 Facundo 2023 10:30  Patient On (Oxygen Delivery Method): room air        I&O's Summary      CAPILLARY BLOOD GLUCOSE      POCT Blood Glucose.: 232 mg/dL (10 Facundo 2023 13:16)  POCT Blood Glucose.: 68 mg/dL (10 Facundo 2023 08:39)  POCT Blood Glucose.: 212 mg/dL (09 Jan 2023 22:35)  POCT Blood Glucose.: 268 mg/dL (09 Jan 2023 16:26)      PHYSICAL EXAM:    Constitutional: NAD,   HEENT: PERR, EOMI,   Neck: Soft and supple,   Respiratory: Breath sounds are clear bilaterally,    Cardiovascular: S1 and S2,   Gastrointestinal: Bowel Sounds present, soft, nontender, Colostomy in place   Extremities: No peripheral edema  Vascular: 2+ peripheral pulses  Neurological: A/O x 3, no focal deficits  Musculoskeletal: 5/5 strength b/l upper and lower extremities  Skin: No rashes    MEDICATIONS:  MEDICATIONS  (STANDING):  albuterol/ipratropium for Nebulization 3 milliLiter(s) Nebulizer every 6 hours  apixaban 5 milliGRAM(s) Oral every 12 hours  collagenase Ointment 1 Application(s) Topical two times a day  collagenase Ointment 1 Application(s) Topical daily  dextrose 5%. 1000 milliLiter(s) (50 mL/Hr) IV Continuous <Continuous>  dextrose 5%. 1000 milliLiter(s) (100 mL/Hr) IV Continuous <Continuous>  dextrose 50% Injectable 25 Gram(s) IV Push once  dextrose 50% Injectable 12.5 Gram(s) IV Push once  dextrose 50% Injectable 25 Gram(s) IV Push once  fluconAZOLE IVPB 400 milliGRAM(s) IV Intermittent every 24 hours  glucagon  Injectable 1 milliGRAM(s) IntraMuscular once  hydrocortisone 25 milliGRAM(s) Oral daily  influenza  Vaccine (HIGH DOSE) 0.7 milliLiter(s) IntraMuscular once  insulin glargine Injectable (LANTUS) 23 Unit(s) SubCutaneous at bedtime  insulin lispro (ADMELOG) corrective regimen sliding scale   SubCutaneous Before meals and at bedtime  insulin lispro Injectable (ADMELOG) 2 Unit(s) SubCutaneous three times a day before meals  magnesium oxide 400 milliGRAM(s) Oral every 8 hours  meropenem Injectable 1000 milliGRAM(s) IV Push every 8 hours  mexiletine 200 milliGRAM(s) Oral every 8 hours  pantoprazole    Tablet 40 milliGRAM(s) Oral daily  polyethylene glycol 3350 17 Gram(s) Oral two times a day  senna 2 Tablet(s) Oral at bedtime  tamsulosin 0.4 milliGRAM(s) Oral at bedtime  tiotropium 2.5 MICROgram(s) Inhaler 2 Puff(s) Inhalation daily  trimethoprim  160 mG/sulfamethoxazole 800 mG 1 Tablet(s) Oral daily      LABS: All Labs Reviewed:                        10.4   7.48  )-----------( 208      ( 09 Jan 2023 07:25 )             34.6     01-09    142  |  102  |  11.0  ----------------------------<  45<LL>  2.9<LL>   |  33.0<H>  |  0.44<L>    Ca    8.5      09 Jan 2023 07:25  Mg     1.8     01-09            Blood Culture:     RADIOLOGY/EKG:    DVT PPX:    ADVANCED DIRECTIVE:    DISPOSITION:
Sepsis    HPI:  72 year old female with ?dementia - sent from [place] for Aortic aneurysm evaluation-y  BIBA from [facility] for worsening SOB since last night [place] -tx to [facility] ,seen by Ct Sx ->and no surgical intervention advised  significant medical hx includes but is not limited to adrenal insufficiency, aortic insufficiency, asthma, Afib on Eliquis, colorectal cancer s/p resection with colostomy bag, COPD, DM, DVT, pelvic fracture, rectal bleeding, seizure, TIA, tracheobronchomalacia, type I aortic dissection s/p Bentall procedure and hemiarch replacement 9/6 , candemia and MRSA  patient is unresponsive and hypoxic and hypotensive - intermittent opens eyes   currently lethargic- daughter at bedside - states she has never been told patient has dementia but she has been in and out of hospitals and rehabs for over 9 months now.- with decubitus ulcers on legs elbow and back- daughter has pictures- states mom has been not really ambulating and not been improving for several months, but does get better neurologically and can hold a conversation at baseline (03 Jan 2023 15:34)    Interval History:  Patient was seen and examined at bedside around 11 am.  Feels much better.   Has non productive cough.   Denies chest pain, palpitations, shortness of breath, nausea, vomiting or abdominal pain.  Tolerating PO.     ROS:  As per interval history otherwise unremarkable.    PHYSICAL EXAM:  Vital Signs   T(C): 36.7 (06 Jan 2023 16:22), Max: 36.7 (06 Jan 2023 16:22)  T(F): 98 (06 Jan 2023 16:22), Max: 98 (06 Jan 2023 16:22)  HR: 66 (06 Jan 2023 16:22) (63 - 77)  BP: 158/89 (06 Jan 2023 16:22) (136/77 - 158/89)  RR: 18 (06 Jan 2023 16:22) (18 - 18)  SpO2: 96% (06 Jan 2023 16:22) (96% - 97%)  Parameters below as of 06 Jan 2023 16:22  Patient On (Oxygen Delivery Method): room air  General: Elderly female sitting in bed comfortably. No acute distress  HEENT: EOMI. Clear conjunctivae. Moist mucus membrane.    Neck: Supple.   Chest: Good air entry. No wheezing, rales or rhonchi. No chest wall tenderness.  Heart: Normal S1 & S2. RRR.   Abdomen: Non distended. Soft. Non-tender. + BS. Colostomy in place.   : Amezcua's catheter in place at the time of exam.   Ext: No pedal edema. No calf tenderness. Ulcer on left heel and right elbow.   Back: Wound on lower back. No signs of infection.   Neuro: Active and alert. No focal deficit. No speech disorder  Skin: Warm and Dry  Psychiatry: Normal mood and affect    I&O's Summary    05 Jan 2023 07:01  -  06 Jan 2023 07:00  --------------------------------------------------------  IN: 0 mL / OUT: 600 mL / NET: -600 mL    LABS:  CAPILLARY BLOOD GLUCOSE  POCT Blood Glucose.: 210 mg/dL (06 Jan 2023 16:52)  POCT Blood Glucose.: 120 mg/dL (06 Jan 2023 14:27)  POCT Blood Glucose.: 160 mg/dL (06 Jan 2023 09:33)  POCT Blood Glucose.: 218 mg/dL (05 Jan 2023 22:19)  POCT Blood Glucose.: 288 mg/dL (05 Jan 2023 18:23)                      9.1    6.60  )-----------( 175      ( 06 Jan 2023 05:30 )             30.0     01-06    142  |  109<H>  |  9.5  ----------------------------<  104<H>  3.9   |  23.0  |  0.40<L>    Ca    8.4      06 Jan 2023 05:30  Mg     1.8     01-06    TPro  5.3<L>  /  Alb  2.8<L>  /  TBili  0.2<L>  /  DBili  x   /  AST  28  /  ALT  19  /  AlkPhos  63  01-05    Blood Culture: 01-05 @ 10:15  Organism --  Gram Stain Blood -- Gram Stain --  Specimen Source .Blood Blood  Culture-Blood --    01-05 @ 10:10  Organism --  Gram Stain Blood -- Gram Stain --  Specimen Source .Blood Blood  Culture-Blood --    01-03 @ 09:50  Organism --  Gram Stain Blood -- Gram Stain --  Specimen Source Clean Catch Clean Catch (Midstream)  Culture-Blood --    01-03 @ 09:20  Organism Blood Culture PCR  Gram Stain Blood -- Gram Stain   Growth in aerobic bottle: Gram Positive Cocci in Clusters  Specimen Source .Blood Blood-Peripheral  Culture-Blood --    01-03 @ 09:16  Organism Staphylococcus epidermidis  Gram Stain Blood -- Gram Stain   Growth in anaerobic bottle: Gram Positive Cocci in Clusters  Specimen Source .Blood Blood-Peripheral  Culture-Blood --    01-03 @ 03:18  Organism Klebsiella pneumoniae  Gram Stain Blood -- Gram Stain --  Specimen Source Clean Catch None  Culture-Blood --    01-03 @ 01:31  Organism --  Gram Stain Blood -- Gram Stain --  Specimen Source .Blood None  Culture-Blood --    01-03 @ 00:41  Organism --  Gram Stain Blood -- Gram Stain --  Specimen Source .Blood Blood-Peripheral  Culture-Blood --    RADIOLOGY & ADDITIONAL STUDIES:  Reviewed     MEDICATIONS  (STANDING):  albuterol/ipratropium for Nebulization 3 milliLiter(s) Nebulizer every 6 hours  apixaban 5 milliGRAM(s) Oral every 12 hours  collagenase Ointment 1 Application(s) Topical two times a day  dextrose 5%. 1000 milliLiter(s) (50 mL/Hr) IV Continuous <Continuous>  dextrose 5%. 1000 milliLiter(s) (100 mL/Hr) IV Continuous <Continuous>  dextrose 50% Injectable 25 Gram(s) IV Push once  dextrose 50% Injectable 12.5 Gram(s) IV Push once  dextrose 50% Injectable 25 Gram(s) IV Push once  fluconAZOLE IVPB 400 milliGRAM(s) IV Intermittent every 24 hours  glucagon  Injectable 1 milliGRAM(s) IntraMuscular once  hydrocortisone sodium succinate Injectable 25 milliGRAM(s) IV Push every 8 hours  influenza  Vaccine (HIGH DOSE) 0.7 milliLiter(s) IntraMuscular once  insulin glargine Injectable (LANTUS) 20 Unit(s) SubCutaneous at bedtime  insulin lispro (ADMELOG) corrective regimen sliding scale   SubCutaneous Before meals and at bedtime  magnesium oxide 400 milliGRAM(s) Oral every 8 hours  meropenem Injectable 1000 milliGRAM(s) IV Push every 8 hours  mexiletine 200 milliGRAM(s) Oral every 8 hours  pantoprazole    Tablet 40 milliGRAM(s) Oral daily  polyethylene glycol 3350 17 Gram(s) Oral two times a day  senna 2 Tablet(s) Oral at bedtime  sodium chloride 0.9%. 1000 milliLiter(s) (70 mL/Hr) IV Continuous <Continuous>  tamsulosin 0.4 milliGRAM(s) Oral at bedtime  tiotropium 2.5 MICROgram(s) Inhaler 2 Puff(s) Inhalation daily  vancomycin  IVPB 750 milliGRAM(s) IV Intermittent every 12 hours    MEDICATIONS  (PRN):  acetaminophen     Tablet .. 650 milliGRAM(s) Oral every 6 hours PRN Temp greater or equal to 38C (100.4F), Mild Pain (1 - 3), Moderate Pain (4 - 6)  dextrose Oral Gel 15 Gram(s) Oral once PRN Blood Glucose LESS THAN 70 milliGRAM(s)/deciliter  lactulose Syrup 10 Gram(s) Oral two times a day PRN constipation  ondansetron Injectable 4 milliGRAM(s) IV Push every 6 hours PRN Nausea and/or Vomiting          
Sepsis    HPI:  72 year old female with ?dementia - sent from [place] for Aortic aneurysm evaluation-y  BIBA from [facility] for worsening SOB since last night [place] -tx to [facility] ,seen by Ct Sx ->and no surgical intervention advised  significant medical hx includes but is not limited to adrenal insufficiency, aortic insufficiency, asthma, Afib on Eliquis, colorectal cancer s/p resection with colostomy bag, COPD, DM, DVT, pelvic fracture, rectal bleeding, seizure, TIA, tracheobronchomalacia, type I aortic dissection s/p Bentall procedure and hemiarch replacement 9/6 , candemia and MRSA  patient is unresponsive and hypoxic and hypotensive - intermittent opens eyes   currently lethargic- daughter at bedside - states she has never been told patient has dementia but she has been in and out of hospitals and rehabs for over 9 months now.- with decubitus ulcers on legs elbow and back- daughter has pictures- states mom has been not really ambulating and not been improving for several months, but does get better neurologically and can hold a conversation at baseline (03 Jan 2023 15:34)    Interval History:  Patient was seen and examined at bedside around 8:45 am.  Was eating breakfast.   Has productive cough. No more blood in sputum.   Denies chest pain, palpitations, shortness of breath, headache, dizziness, visual symptoms, nausea, vomiting or abdominal pain.    ROS:  As per interval history otherwise unremarkable.    PHYSICAL EXAM:  Vital Signs   T(C): 36.4 (05 Jan 2023 08:57), Max: 36.8 (04 Jan 2023 16:09)  T(F): 97.6 (05 Jan 2023 08:57), Max: 98.3 (04 Jan 2023 16:09)  HR: 82 (05 Jan 2023 09:58) (81 - 94)  BP: 129/70 (05 Jan 2023 08:57) (117/77 - 130/81)  RR: 18 (05 Jan 2023 08:57) (18 - 18)  SpO2: 98% (05 Jan 2023 09:58) (96% - 98%)  Parameters below as of 05 Jan 2023 09:58  Patient On (Oxygen Delivery Method): room air  General: Elderly female sitting in bed comfortably. No acute distress  HEENT: EOMI. Clear conjunctivae. Moist mucus membrane. NGT in place at the time of exam.   Neck: Supple.   Chest: Good air entry. No wheezing, rales or rhonchi. No chest wall tenderness.  Heart: Normal S1 & S2. RRR.   Abdomen: Non distended. Soft. Non-tender. + BS. Colostomy in place.   : Amezcua's catheter in place.   Ext: No pedal edema. No calf tenderness   Neuro: Active and alert. No focal deficit. No speech disorder  Skin: Warm and Dry  Psychiatry: Normal mood and affect    I&O's Summary    04 Jan 2023 07:01  -  05 Jan 2023 07:00  --------------------------------------------------------  IN: 0 mL / OUT: 250 mL / NET: -250 mL    LABS:  CAPILLARY BLOOD GLUCOSE  POCT Blood Glucose.: 294 mg/dL (05 Jan 2023 13:02)  POCT Blood Glucose.: 219 mg/dL (05 Jan 2023 08:22)  POCT Blood Glucose.: 224 mg/dL (04 Jan 2023 21:17)  POCT Blood Glucose.: 190 mg/dL (04 Jan 2023 17:29)                      9.2    6.73  )-----------( 168      ( 05 Jan 2023 06:54 )             30.6     01-05    139  |  105  |  12.8  ----------------------------<  222<H>  3.7   |  22.0  |  0.39<L>    Ca    8.1<L>      05 Jan 2023 06:54  Mg     1.8     01-05    TPro  5.3<L>  /  Alb  2.8<L>  /  TBili  0.2<L>  /  DBili  x   /  AST  28  /  ALT  19  /  AlkPhos  63  01-05    Blood Culture: 01-03 @ 09:50  Organism --  Gram Stain Blood -- Gram Stain --  Specimen Source Clean Catch Clean Catch (Midstream)  Culture-Blood --    01-03 @ 09:20  Organism Blood Culture PCR  Gram Stain Blood -- Gram Stain   Growth in aerobic bottle: Gram Positive Cocci in Clusters  Specimen Source .Blood Blood-Peripheral  Culture-Blood --    01-03 @ 09:16  Organism --  Gram Stain Blood -- Gram Stain   Growth in anaerobic bottle: Gram Positive Cocci in Clusters  Specimen Source .Blood Blood-Peripheral  Culture-Blood --    01-03 @ 03:18  Organism Klebsiella pneumoniae  Gram Stain Blood -- Gram Stain --  Specimen Source Clean Catch None  Culture-Blood --    01-03 @ 01:31  Organism --  Gram Stain Blood -- Gram Stain --  Specimen Source .Blood None  Culture-Blood --    01-03 @ 00:41  Organism --  Gram Stain Blood -- Gram Stain --  Specimen Source .Blood Blood-Peripheral  Culture-Blood --    RADIOLOGY & ADDITIONAL STUDIES:  Reviewed     MEDICATIONS  (STANDING):  albuterol    90 MICROgram(s) HFA Inhaler 2 Puff(s) Inhalation every 6 hours  apixaban 5 milliGRAM(s) Oral every 12 hours  collagenase Ointment 1 Application(s) Topical two times a day  dextrose 5%. 1000 milliLiter(s) (100 mL/Hr) IV Continuous <Continuous>  dextrose 5%. 1000 milliLiter(s) (50 mL/Hr) IV Continuous <Continuous>  dextrose 50% Injectable 25 Gram(s) IV Push once  dextrose 50% Injectable 12.5 Gram(s) IV Push once  dextrose 50% Injectable 25 Gram(s) IV Push once  fluconAZOLE IVPB 400 milliGRAM(s) IV Intermittent every 24 hours  glucagon  Injectable 1 milliGRAM(s) IntraMuscular once  hydrocortisone sodium succinate Injectable 50 milliGRAM(s) IV Push every 8 hours  influenza  Vaccine (HIGH DOSE) 0.7 milliLiter(s) IntraMuscular once  insulin glargine Injectable (LANTUS) 20 Unit(s) SubCutaneous at bedtime  insulin lispro (ADMELOG) corrective regimen sliding scale   SubCutaneous Before meals and at bedtime  magnesium oxide 400 milliGRAM(s) Oral every 8 hours  meropenem Injectable 1000 milliGRAM(s) IV Push every 8 hours  mexiletine 200 milliGRAM(s) Oral every 8 hours  pantoprazole    Tablet 40 milliGRAM(s) Oral daily  polyethylene glycol 3350 17 Gram(s) Oral two times a day  sodium chloride 0.9%. 1000 milliLiter(s) (70 mL/Hr) IV Continuous <Continuous>  tamsulosin 0.4 milliGRAM(s) Oral at bedtime  tiotropium 2.5 MICROgram(s) Inhaler 2 Puff(s) Inhalation daily  vancomycin  IVPB 750 milliGRAM(s) IV Intermittent every 12 hours    MEDICATIONS  (PRN):  acetaminophen     Tablet .. 650 milliGRAM(s) Oral every 6 hours PRN Temp greater or equal to 38C (100.4F), Mild Pain (1 - 3), Moderate Pain (4 - 6)  dextrose Oral Gel 15 Gram(s) Oral once PRN Blood Glucose LESS THAN 70 milliGRAM(s)/deciliter  lactulose Syrup 10 Gram(s) Oral two times a day PRN constipation  ondansetron Injectable 4 milliGRAM(s) IV Push every 6 hours PRN Nausea and/or Vomiting

## 2023-01-12 NOTE — PROGRESS NOTE ADULT - PROVIDER SPECIALTY LIST ADULT
Hospitalist
Infectious Disease
Podiatry
Hospitalist
Hospitalist

## 2023-01-12 NOTE — PROGRESS NOTE ADULT - REASON FOR ADMISSION
sepsis hypoxia

## 2023-01-12 NOTE — PROGRESS NOTE ADULT - ASSESSMENT
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 Fib on Eliquis, TIA, Seizures, DM 2, DVT, COPD, Fungemia on Fluconazole, MRSA on Bactrim, Right Foot Osteomyelitis and Pelvic Fracture presented to [place] ER from [facility] with fever, tachycardia and vomiting -- found to have aortic dissection so she was transferred to [facility] for evaluation.  Per CT Surgery, no further intervention.     *Sepsis   Likely due to Pneumonia (Aspiration vs HCAP - Gram Negative Rods) and Pyelonephritis  Blood cultures with MRSE, follow repeat cultures - negative so far   Urine culture from [place] with Klebsiella and Proteus ESBL   Completed course of Meropenem, Vancomycin.  ID following   Sepsis resolved     *Aortic Dissection   Likely chronic   No intervention or repeat imaging as per CTS or Vascular Surgery     *History of Dysphagia, Tracheomalacia - s/p MBS: soft bite sized solids with thin liquids.  She is presently tolerating po intake.  Daughter had been requesting PEG.  We discussed the MBS study and the risks, benefits and alternatives.  They were in agreement with the current plan of care and will f/u with CTS.  Outpatient f/u with CTS regarding role for PEG.      *Metabolic Encephalopathy  resolved     *History of Fungemia / MRSA  On chronic suppressive therapy   Continue Fluconazole   Resumed Bactrim   ID following     *Adrenal Insufficiency   Continue stress dose steroids (decreased Cortef to 25 mg q 24 hours)    *PAF  Continue Mexiletine and Eliquis     *DM 2  HbA1c 8.6 on 12/15/22  Accu checks and ISS  decreased Lantus to 23 units at bedtime due to hypoglycemia   Continue Premeal Admelog 2 units     *COPD  Continue Spiriva   DuoNebs     *Urinary Retention  Successful TOV on 1/6/23  Added Flomax    *RSV Infection  Supportive Care     *Left Heel / Right Elbow / Lower Back Ulcers  Podiatry / Wound Care Consults appreciated  Wound care     DVT Prophylaxis -- Patient is on Eliquis.    Advance Directives: Full Code.    Dispo: Stable for [facility].      Dr. Woodard had attempted to call patient's Thoracic Surgeon (Dr. Zohaib Zapien [phone])multiple times, unable to reach.    Stable for [facility].  I dw daughter Zoe via phone at [phone] on 1/11.  Reviewed MBS with daughter, option of PEG placement discussed.  She agreed on trial of po intake per S&S recommendations, and outpatient f/u with Dr. Zapien regarding furhter plans.  She consideration and balance of reaspiration vs quality of life was reviewed.   Agrees to proceed with [facility].

## 2023-01-13 ENCOUNTER — TRANSCRIPTION ENCOUNTER (OUTPATIENT)
Age: 73
End: 2023-01-13

## 2023-01-20 NOTE — SWALLOW BEDSIDE ASSESSMENT ADULT - POSITIONING
Quality 431: Preventive Care And Screening: Unhealthy Alcohol Use - Screening: Patient not identified as an unhealthy alcohol user when screened for unhealthy alcohol use using a systematic screening method Quality 130: Documentation Of Current Medications In The Medical Record: Current Medications Documented Quality 226: Preventive Care And Screening: Tobacco Use: Screening And Cessation Intervention: Patient screened for tobacco use and is an ex/non-smoker Detail Level: Detailed upright (90 degrees)

## 2023-01-24 ENCOUNTER — APPOINTMENT (OUTPATIENT)
Dept: PULMONOLOGY | Facility: CLINIC | Age: 73
End: 2023-01-24
Payer: MEDICARE

## 2023-01-24 VITALS
RESPIRATION RATE: 16 BRPM | HEIGHT: 67 IN | WEIGHT: 129 LBS | BODY MASS INDEX: 20.25 KG/M2 | TEMPERATURE: 97.2 F | HEART RATE: 106 BPM | OXYGEN SATURATION: 99 % | SYSTOLIC BLOOD PRESSURE: 110 MMHG | DIASTOLIC BLOOD PRESSURE: 62 MMHG

## 2023-01-24 PROCEDURE — 90662 IIV NO PRSV INCREASED AG IM: CPT

## 2023-01-24 PROCEDURE — G0008: CPT

## 2023-01-24 PROCEDURE — 99214 OFFICE O/P EST MOD 30 MIN: CPT | Mod: 25

## 2023-01-24 NOTE — ASSESSMENT
[FreeTextEntry1] : Ms. Bloom is a 72 year old female with a history of mm, rectal CA, AVDz, asthma, allergy, GERD, TBM, s/p multiple pneumonias, who presents s/p TAA repair/ sepsis/ PNA- mild sob/ mucous production c/w active severe persistent asthma (still); off usual Rx\par \par Her chronic SOB which is multifactorial due to:\par - tracheomalacia (s/p tracheoplasty with residual frequent mucus production, though patient is non-compliant with vest therapy)\par - chronic bronchitis\par - asthma\par - allergic rhinitis\par - GERD\par - poor breathing mechanics \par - CAD/AV disease, arrhythmia, electrolyte issue\par \par problem 1a: severe persistent asthma -(steroid dependent) - active \par -continue Medrol 8mg/day \par -continue to use albuterol via the nebulizer QID or DuoNeb via nebulizer, Q6H \par -followed by Mucomyst QiD 2cc 10% q8h\par -followed by the acapella device/ chest vest therapy \par -(1st) followed by Perforomist via the nebulizer BID\par -(2nd) followed by Budesonide 0.5% via the nebulizer BID \par -continue to use Breo Ellipta 200 at 1 inhalation QD \par -continue to use Spiriva 1 inhalation QD\par -failed Xolair 225 injection; follow up injections every 2 weeks - Dupixent initiated (12.3.19) -off since 3/2022 - Initiate Tezspire 8/2022\par -continue to use Accolate 20 mg BID\par -Information sheet given about prednisone to the patient to be reviewed, this medication is never to be used without consulting the prescribing physician. Proper dietary restraint is necessary specifically salt containing foods, if any reaction may occur should be reported. \par -Asthma is believed to be caused by inherited (genetic) and environmental factor, but its exact cause is unknown. Asthma may be triggered by allergens, lung infections, or irritants in the air. Asthma triggers are different for each person\par -Inhaler technique reviewed as well as oral hygiene techniques reviewed with patient. Avoidance of cold air, extremes of temperature, rescue inhaler should be used before exercise. Order of medication reviewed with patient. Recommended use of a cool mist humidifier in the bedroom. \par \par problem 2: tracheomalacia, residual bronchomalacia \par -s/p tracheoplasty with Dr. [place] Zapien\par -laser Bronchoscopy pending\par -continue to follow up \par -s/p f/u Dynamic chest CT 10/2019 positive w TBM - repeat PRN\par -Tracheomalacia is usually acquired in adults and common causes include damage by tracheostomy or endotracheal intubation damaging the tracheal cartilage with increase risk with multiple intubations, prolonged intubation, and concurrent high dose steroid therapy; external chest wall trauma and surgery; chronic compression of the trachea by benign etiologies (eg, benign mediastinal goiter) or malignancy; relapsing polychondritis; or recurrent infection. Tracheomalacia can be asymptomatic, however signs or symptoms can develop as the severity of the airway narrowing progresses with major symptoms include dyspnea, cough, and sputum retention. Other symptoms include severe paroxysms of coughing, wheezing or stridor, barking cough and may be exacerbated by forced expiration, cough, and Valsalva maneuver. Tracheomalacia is diagnosed by a bronchoscopic visualization of dynamic airway collapse on dynamic chest CT. Therapy is warranted in symptomatic patients with severe tracheomalacia and includes surgical repair as tracheobronchoplasty. The patient was referred to Dr. Valentino Nguyen or Dr. Abrahan Armando at [facility] for a surgical consult. \par \par problem 3: chronic bronchitis and mucus clearance\par -continue to use acapella device multiple times daily\par -recommended to use chest vest therapy multiple times daily \par -she is being sent for sputum cultures \par Patient has a chronic cough greater than 6 months, tried and failed manual chest physiotherapy at home, no skilled caregiver available at home to perform manual CPT, tried and failed acapella vibratory physiotherapy, and recommended chest vest therapy \par \par Problem 3A: Multiple infections ?Active (1/31/2022) \par -off Tobramycin BID for 1 month (completed 12/20/19)\par -Complete follow up Sputum culture after completing ABx if needed. (resend) \par \par Problem 3B: multi myeloma\par -appointment  on 1/28/2020\par \par problem 4: GERD\par -continue to use Protonix 40 mg before breakfast\par -continue Baclofen 10 mg q-meal\par -Rule of 2s: avoid eating too much, eating too late, eating too spicy, eating two hours before bed\par -Things to avoid including overeating, spicy foods, tight clothing, eating within three hours of bed, this list is not all inclusive. \par -For treatment of reflux, possible options discussed including diet control, H2 blockers, PPIs, as well as coating motility agents discussed as treatment options. Timing of meals and proximity of last meal to sleep were discussed. If symptoms persist, a formal gastrointestinal evaluation is needed. \par \par problem 5: allergic rhinitis \par -continue to use nasal saline\par -continue to use Xyzal 5 mg before bed\par -Environmental measures for allergies were encouraged including mattress and pillow cover, air purifier, and environmental controls. \par \par problem 6: hx of abnormal aortic valve / cardiac health - arrhythmia \par -continue to follow up with Dr. Leahy, AV Disease, AF\par -echocardiogram in June 2018  (Tosin); Kale Tristan for f/u, Ismail\par \par problem 7: colon cancer\par -s/p radiation therapy, surgery \par -continue to use chemotherapy follow up with Dr. King or Dr. Mario\par \par problem 8: poor breathing mechanics\par -Recommended Wim Hof and Buteyko breathing techniques \par -Proper breathing techniques were reviewed with an emphasis of exhalation. Patient instructed to breath in for 1 second and out for four seconds. Patient was encouraged to not talk while walking.\par \par problem 9: immunodeficiency\par - Due to the fact that this pt has had more infections than would be expected and immunological blood work is indicated this would include: IgG subclasses, quantitative immunoglobulins, Strep pneumoniae titers as well as Vitamin D levels. Based on this blood work we will be able to decide where the pt needs additional pneumococcal vaccine either Prevnar 13 or pneumovax. Immunology evaluation will also be potentially indicated.\par \par problem 10: r/o immunodeficiency (on Medrol)\par -Due to the fact that this pt has had more infections than would be expected and immunological blood work is indicated this would include: IgG subclasses, quantitative immunoglobulins, Strep pneumoniae titers as well as Vitamin D levels.\par -Based on this blood work we will be able to decide where the pt needs additional pneumococcal vaccine either Prevnar 13 or pneumovax. Immunology evaluation will also be potentially indicated. \par \par problem 11: abnormal chest CT- ? new nodule- likely inflammation \par - F/u PET/CT 4/2019- if changed Bx (Rupali); follow up and re-evaluate as per Rupali\par -questionable DIEUDONNE or HERMELINDO, all sputum is negative \par -CAT scans are the only radiological modality to identify abnormalities w/in the lungs with regards to nodules/masses/lymph nodes. Risks, benefits were reviewed in detail. The guidelines for abnormalities include follow up CT scans at various intervals which could range from 6 weeks to 1 year intervals. If there is a change for the worse then consideration for a biopsy will be considered if you are a candidate. Second opinion evaluation with a thoracic surgeon or an interventional radiologist could be offered. 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 It sometimes develops following a URI, herpes zoster outbreak in pharynx or thyroid or cervical spine injury. However, many patients have no identifiable antecedent explanation. \par \par Problem 14: Health Maintenance/COVID19 Precautions \par -s/p  Moderna COVID 19 vaccine x 3\par - Clean your hands often. Wash your hands often with soap and water for at least 20 seconds, especially after blowing your nose, coughing, or sneezing, or having been in a public place.\par - If soap and water are not available, use a hand  that contains at least 60% alcohol.\par - To the extent possible, avoid touching high-touch surfaces in public places - elevator buttons, door handles, handrails, handshaking with people, etc. Use a tissue or your sleeve to cover your hand or finger if you must touch something.\par - Wash your hands after touching surfaces in public places.\par - Avoid touching your face, nose, eyes, etc.\par - Clean and disinfect your home to remove germs: practice routine cleaning of frequently touched surfaces (for example: tables, doorknobs, light switches, handles, desks, toilets, faucets, sinks & cell phones)\par - Avoid crowds, especially in poorly ventilated spaces. Your risk of exposure to respiratory viruses like COVID-19 may increase in crowded, closed-in settings with little air circulation if there are people in the crowd who are sick. All patients are recommended to practice social distancing and stay at least 6 feet away from others. \par - Avoid all non-essential travel including plane trips, and especially avoid embarking on cruise ships.\par -If COVID-19 is spreading in your community, take extra measures to put distance between yourself and other people to further reduce your risk of being exposed to this new virus.\par -Stay home as much as possible.\par - Consider ways of getting food brought to your house through family, social, or commercial networks\par -Be aware that the virus has been known to live in the air up to 3 hours post exposure, cardboard up to 24 hours post exposure, copper up to 4 hours post exposure, steel and plastic up to 2-3 days post exposure. Risk of transmission from these surfaces are affected by many variables.\par COVID-19 precautionary Immune Support Recommendations:\par -OTC Vitamin C 500mg BID \par -OTC Quercetin 250-500mg BID \par -OTC Zinc 75-100mg per day \par -OTC Melatonin 1 or 2mg a night \par -OTC Vitamin D 1-4000mg per day \par -OTC Tonic Water 8oz per day\par -Water 0.5-1 gallon per day\par Asthma and COVID19:\par You need to make sure your asthma is under control. This often requires the use of inhaled corticosteroids (and sometimes oral corticosteroids). Inhaled corticosteroids do not likely reduce your immune system’s ability to fight infections, but oral corticosteroids may. It is important to use the steps above to protect yourself to limit your exposure to any respiratory virus. \par \par problem 15:  health maintenance \par -s/p flu shot 10/30/2020\par -6/2021 Shingeles: Valacyclovir in progress \par -recommended strep pneumonia vaccines: Prevnar-13 vaccine, followed by Pneumo vaccine 23 one year following\par -recommended early intervention for URIs\par -recommended regular osteoporosis evaluations\par -recommended early dermatological evaluations\par -recommended after the age of 50 to the age of 70, colonoscopy every 5 years \par \par F/U in 6 weeks - SPI / NIOX\par She is encouraged to call with any changes, concerns, or questions.

## 2023-01-24 NOTE — ADDENDUM
[FreeTextEntry1] : Documented by GARRETT Rincon acting as a scribe for Dr. Eulalio Allison on 01/24/2023 .\par \par All medical record entries made by the Scribe were at my, Dr. Eulalio Allison's, direction and personally dictated by me on 01/24/2023. I have reviewed the chart and agree that the record accurately reflects my personal performance of the history, physical exam, assessment and plan. I have also personally directed, reviewed, and agree with the discharge instructions.

## 2023-01-26 ENCOUNTER — APPOINTMENT (OUTPATIENT)
Dept: ELECTROPHYSIOLOGY | Facility: CLINIC | Age: 73
End: 2023-01-26
Payer: MEDICARE

## 2023-01-26 ENCOUNTER — NON-APPOINTMENT (OUTPATIENT)
Age: 73
End: 2023-01-26

## 2023-01-26 ENCOUNTER — APPOINTMENT (OUTPATIENT)
Dept: CARDIOLOGY | Facility: CLINIC | Age: 73
End: 2023-01-26
Payer: MEDICARE

## 2023-01-26 VITALS
SYSTOLIC BLOOD PRESSURE: 114 MMHG | BODY MASS INDEX: 20.25 KG/M2 | HEART RATE: 99 BPM | WEIGHT: 129 LBS | TEMPERATURE: 97.2 F | HEIGHT: 67 IN | DIASTOLIC BLOOD PRESSURE: 80 MMHG | OXYGEN SATURATION: 100 %

## 2023-01-26 PROCEDURE — 93000 ELECTROCARDIOGRAM COMPLETE: CPT

## 2023-01-26 PROCEDURE — 99214 OFFICE O/P EST MOD 30 MIN: CPT

## 2023-01-26 PROCEDURE — 99215 OFFICE O/P EST HI 40 MIN: CPT

## 2023-01-26 PROCEDURE — 93010 ELECTROCARDIOGRAM REPORT: CPT

## 2023-01-26 RX ORDER — LORATADINE 10 MG
17 TABLET,DISINTEGRATING ORAL TWICE DAILY
Refills: 0 | Status: ACTIVE | COMMUNITY

## 2023-01-26 RX ORDER — BUDESONIDE 0.5 MG/2ML
0.5 INHALANT ORAL
Qty: 60 | Refills: 2 | Status: DISCONTINUED | COMMUNITY
Start: 2017-08-09 | End: 2023-01-26

## 2023-01-26 RX ORDER — ZOSTER VACCINE RECOMBINANT, ADJUVANTED 50 MCG/0.5
50 KIT INTRAMUSCULAR
Qty: 1 | Refills: 1 | Status: DISCONTINUED | COMMUNITY
Start: 2021-12-14 | End: 2023-01-26

## 2023-01-26 RX ORDER — DILTIAZEM HYDROCHLORIDE 60 MG/1
60 TABLET ORAL
Qty: 60 | Refills: 5 | Status: DISCONTINUED | COMMUNITY
Start: 2022-07-28 | End: 2023-01-26

## 2023-01-26 RX ORDER — ERGOCALCIFEROL 1.25 MG/1
1.25 MG CAPSULE ORAL
Qty: 13 | Refills: 3 | Status: DISCONTINUED | COMMUNITY
Start: 2020-11-16 | End: 2023-01-26

## 2023-01-26 RX ORDER — INSULIN GLARGINE 100 [IU]/ML
100 INJECTION, SOLUTION SUBCUTANEOUS DAILY
Qty: 5 | Refills: 3 | Status: DISCONTINUED | COMMUNITY
Start: 2018-10-25 | End: 2023-01-26

## 2023-01-26 RX ORDER — INSULIN LISPRO 100 [IU]/ML
100 INJECTION, SOLUTION INTRAVENOUS; SUBCUTANEOUS EVERY 8 HOURS
Qty: 6 | Refills: 3 | Status: DISCONTINUED | COMMUNITY
Start: 1900-01-01 | End: 2023-01-26

## 2023-01-26 RX ORDER — GUAIFENESIN 600 MG/1
TABLET, EXTENDED RELEASE ORAL
Refills: 0 | Status: DISCONTINUED | COMMUNITY
End: 2023-01-26

## 2023-01-26 RX ORDER — TIOTROPIUM BROMIDE 18 UG/1
18 CAPSULE ORAL; RESPIRATORY (INHALATION)
Refills: 0 | Status: DISCONTINUED | COMMUNITY
Start: 2022-07-28 | End: 2023-01-26

## 2023-01-26 NOTE — HISTORY OF PRESENT ILLNESS
[FreeTextEntry1] : Shirley Bloom is a 71y/o woman with Hx of tracheobronchomalacia s/p tracheobronchoplasty, lung nodules, severe allergic asthma, adrenal insufficiency, bronchiectasis, DM II, HTN, CRC s/p colostomy, mod-severe AI, paroxysmal afib, on Eliquis, and frequent PVCs, on mexiletine, who presents today for routine f/u. On prior visit she had been hospitalized for osteomyelitis and discharged to Rehab for IV antibiotics via left chest wall mediport. When hospitalized, course c/b anaphylaxis 2/2 cefepime administration as well as episode of Vtach following epi administration requiring lidocaine for . She spent -2022 in MICU intubated and sedated, and was \par successfully extubated on 22. R foot MRI concerning for osteomyelitis. On 22 she underwent R foot I&D and wound debridement by podiatry team. Since that time, has been doing well. Restarted mexiletine 150 BID on prior visit (was taken off in Rehab) with improvement in palpitations. Denies chest pain, palpitations, SOB, syncope or near syncope.

## 2023-01-26 NOTE — DISCUSSION/SUMMARY
[FreeTextEntry1] : Impression:\par \par 1. PVCs: EKG reviewed today and reveals NSR without evidence of PVCs. Has long standing history of PVCs, suppressed with mexiletine use (recurrent symptoms when taken off in 2022). Resume mexiletine 150mg BID for continued PVC suppression. Last ECHO 2021 with normal LVEF. \par \par 2. HTN: resume oral antihypertensives as prescribed. Encouraged heart healthy diet, sodium restriction, and weight loss. Continue regular f/u with Cardiologist for further HTN management.\par \par 3. Paroxysmal afib: no recent afib noted. Remains on Eliquis for thromboembolic prophylaxis. \par \par 4. HLD: resume statin therapy as prescribed and regular f/u with Cardiologist for routine lipid monitoring and management.\par \par Resume regular f/u with Cardiologist and may RTO as needed or if any new or worsening symptoms occur.

## 2023-01-26 NOTE — H&P PST ADULT - VISION (WITH CORRECTIVE LENSES IF THE PATIENT USUALLY WEARS THEM):
REPORT OF CONTINUOUS VIDEO EEG   [facility]: [address], [place] [address], Phone: [phone] [facility]: [address], [place] [address], Phone: [phone] [facility]: [address] [place] [address], Phone: [phone]  Patient Name: Mayra Salazar   Age: 45 year, : 1978 MRN #: -, [place] Referring Physician: - EEG #: [id number]  Study Date: 2023   Start Time: 1:13:27 AM     End Date:          End Time: 08:00:03 AM    Study Duration: 367.9  Study Information:  EEG Recording Technique: The patient underwent continuous Video-EEG monitoring, using Telemetry System hardware on the XLTek Digital System. EEG and video data were stored on a computer hard drive with important events saved in digital archive files. The material was reviewed by a physician (electroencephalographer / epileptologist) on a daily basis. Devon and seizure detection algorithms were utilized and reviewed. An EEG Technician attended to the patient, and was available throughout daytime work hours.  The epilepsy center neurologist was available in person or on call 24-hours per day.  EEG Placement and Labeling of Electrodes: The EEG was performed utilizing 20 channel referential EEG connections (coronal over temporal over parasagittal montage) using all standard 10-20 electrode placements with EKG, with additional electrodes placed in the inferior temporal region using the modified 10-10 montage electrode placements for elective admissions, or if deemed necessary. Recording was at a sampling rate of 256 samples per second per channel. Time synchronized digital video recording was done simultaneously with EEG recording. A low light infrared camera was used for low light recording.   History:  VEEG performed at bedside COR: awake, unable to follow commands NO HV protocol due to COVID protocol NO PHOTIC due to pt keeps eyes open, unable to follow commands NOTCH flter applied to EKG  46 y/o Female  HO: ESRD, ITP, Chronic renal insufficiency PW: AMS   Medication Oxycodone Protonix Vimpat (Lacosamide)  Interpretation:  [[[Abbreviation Key:  PDR=alpha rhythm/posterior dominant rhythm. A-P=anterior posterior.  Amplitude: ‘very low’:<20; ‘low’:20-49; ‘medium’:; ‘high’:>150uV.  Persistence for periodic/rhythmic patterns (% of epoch) ‘rare’:<1%; ‘occasional’:1-10%; ‘frequent’:10-50%; ‘abundant’:50-90%; ‘continuous’:>90%.  Persistence for sporadic discharges: ‘rare’:<1/hr; ‘occasional’:1/min-1/hr; ‘frequent’:>1/min; ‘abundant’:>1/10 sec.  RPP=rhythmic and periodic patterns; GRDA=generalized rhythmic delta activity; FIRDA=frontal intermittent GRDA; LRDA=lateralized rhythmic delta activity; TIRDA=temporal intermittent rhythmic delta activity;  LPD=PLED=lateralized periodic discharges; GPD=generalized periodic discharges; BIPDs =bilateral independent periodic discharges; Mf=multifocal; SIRPDs=stimulus induced rhythmic, periodic, or ictal appearing discharges; BIRDs=brief potentially ictal rhythmic discharges >4 Hz, lasting .5-10s; PFA (paroxysmal bursts >13 Hz or =8 Hz <10s).  Modifiers: +F=with fast component; +S=with spike component; +R=with rhythmic component.  S-B=burst suppression pattern.  Max=maximal. N1-drowsy; N2-stage II sleep; N3-slow wave sleep. SSS/BETS=small sharp spikes/benign epileptiform transients of sleep. HV=hyperventilation; PS=photic stimulation]]]  Daily EEG Visual Analysis  Study Date: 2023   Start Time: 1:13:27 AM    x Duration (hours) = 6:07  FINDINGS:    Background: Symmetry: asymmetric Continuous: continuous PDR: absent Reactivity: present Voltage: normal, mostly 20-150uV Anterior Posterior Gradient: absent Breach: absent  Background Slowing: Generalized slowing: predominantly theta and delta waves Focal slowing: subtle R hemispheric attenuation was present.  State Changes:  -Stage II sleep transients were not recorded.  Sporadic Epileptiform Discharges:  None  Electrographic and Electroclinical seizures: None  Other Clinical Events: None  Activation Procedures:  -Hyperventilation was not performed.   -Photic stimulation was not performed.   Artifacts: Intermittent myogenic and movement artifacts were noted.  ECG: The heart rate on single channel ECG   EEG Summary / Classification:  Abnormal EEG Right hemispheric attenuation Background slowing, moderate generalized   EEG Impression / Clinical Correlate:  Abnormal prolonged EEG study. There is evidence of Right hemispheric abnormality Moderate generalized or multifocal brain dysfunction No epileptic discharges recorded. No seizures recorded.   Robert Peterson MD Neurology Resident     ________________________	 Tanner Buckley MD			   Attending Physician, [facility]   ------------------------------------ EEG Reading Room: [phone] On Call Service After Hours: [phone]    REPORT OF CONTINUOUS VIDEO EEG   [facility]: [address], [place] [address], Phone: [phone] [facility]: [address] [place] [address], [place] [address], Phone: [phone] [facility]: [address] [place] [address], Phone: [phone]  Patient Name: Mayra Salazar   Age: 45 year, : 1978 MRN #: -, [place] Referring Physician: - EEG #: [id number]-  Study Date: 2023   Start Time: 1:13:27 AM     End Date: 2023         End Time: 08:00:03 AM    Study Duration: 6H 45min  Study Information:  EEG Recording Technique: The patient underwent continuous Video-EEG monitoring, using Telemetry System hardware on the XLTek Digital System. EEG and video data were stored on a computer hard drive with important events saved in digital archive files. The material was reviewed by a physician (electroencephalographer / epileptologist) on a daily basis. Devon and seizure detection algorithms were utilized and reviewed. An EEG Technician attended to the patient, and was available throughout daytime work hours.  The epilepsy center neurologist was available in person or on call 24-hours per day.  EEG Placement and Labeling of Electrodes: The EEG was performed utilizing 20 channel referential EEG connections (coronal over temporal over parasagittal montage) using all standard 10-20 electrode placements with EKG, with additional electrodes placed in the inferior temporal region using the modified 10-10 montage electrode placements for elective admissions, or if deemed necessary. Recording was at a sampling rate of 256 samples per second per channel. Time synchronized digital video recording was done simultaneously with EEG recording. A low light infrared camera was used for low light recording.   History:  VEEG performed at bedside COR: awake, unable to follow commands NO HV protocol due to COVID protocol NO PHOTIC due to pt keeps eyes open, unable to follow commands NOTCH flter applied to EKG  44 y/o Female  HO: ESRD, ITP, Chronic renal insufficiency PW: AMS   Medication Oxycodone Protonix Vimpat (Lacosamide)  Interpretation:  [[[Abbreviation Key:  PDR=alpha rhythm/posterior dominant rhythm. A-P=anterior posterior.  Amplitude: ‘very low’:<20; ‘low’:20-49; ‘medium’:; ‘high’:>150uV.  Persistence for periodic/rhythmic patterns (% of epoch) ‘rare’:<1%; ‘occasional’:1-10%; ‘frequent’:10-50%; ‘abundant’:50-90%; ‘continuous’:>90%.  Persistence for sporadic discharges: ‘rare’:<1/hr; ‘occasional’:1/min-1/hr; ‘frequent’:>1/min; ‘abundant’:>1/10 sec.  RPP=rhythmic and periodic patterns; GRDA=generalized rhythmic delta activity; FIRDA=frontal intermittent GRDA; LRDA=lateralized rhythmic delta activity; TIRDA=temporal intermittent rhythmic delta activity;  LPD=PLED=lateralized periodic discharges; GPD=generalized periodic discharges; BIPDs =bilateral independent periodic discharges; Mf=multifocal; SIRPDs=stimulus induced rhythmic, periodic, or ictal appearing discharges; BIRDs=brief potentially ictal rhythmic discharges >4 Hz, lasting .5-10s; PFA (paroxysmal bursts >13 Hz or =8 Hz <10s).  Modifiers: +F=with fast component; +S=with spike component; +R=with rhythmic component.  S-B=burst suppression pattern.  Max=maximal. N1-drowsy; N2-stage II sleep; N3-slow wave sleep. SSS/BETS=small sharp spikes/benign epileptiform transients of sleep. HV=hyperventilation; PS=photic stimulation]]]  Daily EEG Visual Analysis   FINDINGS:    Background: Symmetry: asymmetric Continuous: continuous PDR: absent Reactivity: present Voltage: normal, mostly 20-150uV Anterior Posterior Gradient: absent Breach: absent  Background Slowing: Generalized slowing: predominantly theta and delta waves Focal slowing: subtle R hemispheric attenuation was present.  State Changes:  -Stage II sleep transients were not recorded.  Sporadic Epileptiform Discharges:  None  Electrographic and Electroclinical seizures: None  Other Clinical Events: None  Activation Procedures:  -Hyperventilation was not performed.   -Photic stimulation was not performed.   Artifacts: Intermittent myogenic and movement artifacts were noted.  ECG: The heart rate on single channel ECG   EEG Summary / Classification:  Abnormal EEG Right hemispheric attenuation Background slowing, moderate generalized   EEG Impression / Clinical Correlate:  Abnormal prolonged EEG study. There is evidence of Right hemispheric abnormality Moderate generalized or multifocal brain dysfunction No epileptic discharges recorded. No seizures recorded.   Robert Peterson MD Neurology Resident     ________________________	 Tanner Buckley MD			   Attending Physician, [facility]   ------------------------------------ EEG Reading Room: [phone] On Call Service After Hours: [phone]    REPORT OF CONTINUOUS VIDEO EEG   [facility]: [address], [place] [address], Phone: [phone] [facility]: [address] [place] [address], [place] [address], Phone: [phone] [facility]: [address] [place] [address], Phone: [phone]  Patient Name: Mayra Salazar   Age: 45 year, : 1978 MRN #: -, [place] Referring Physician: - EEG #: [id number]-  Study Date: 2023   Start Time: 1:13:27 AM     End Date: 2023         End Time:     Study Duration: 18H 30min  Study Information:  EEG Recording Technique: The patient underwent continuous Video-EEG monitoring, using Telemetry System hardware on the XLTek Digital System. EEG and video data were stored on a computer hard drive with important events saved in digital archive files. The material was reviewed by a physician (electroencephalographer / epileptologist) on a daily basis. Devon and seizure detection algorithms were utilized and reviewed. An EEG Technician attended to the patient, and was available throughout daytime work hours.  The epilepsy center neurologist was available in person or on call 24-hours per day.  EEG Placement and Labeling of Electrodes: The EEG was performed utilizing 20 channel referential EEG connections (coronal over temporal over parasagittal montage) using all standard 10-20 electrode placements with EKG, with additional electrodes placed in the inferior temporal region using the modified 10-10 montage electrode placements for elective admissions, or if deemed necessary. Recording was at a sampling rate of 256 samples per second per channel. Time synchronized digital video recording was done simultaneously with EEG recording. A low light infrared camera was used for low light recording.   History:  VEEG performed at bedside COR: awake, unable to follow commands NO HV protocol due to COVID protocol NO PHOTIC due to pt keeps eyes open, unable to follow commands NOTCH flter applied to EKG  44 y/o Female  HO: ESRD, ITP, Chronic renal insufficiency PW: AMS   Medication Oxycodone Protonix Vimpat (Lacosamide)  Interpretation:  [[[Abbreviation Key:  PDR=alpha rhythm/posterior dominant rhythm. A-P=anterior posterior.  Amplitude: ‘very low’:<20; ‘low’:20-49; ‘medium’:; ‘high’:>150uV.  Persistence for periodic/rhythmic patterns (% of epoch) ‘rare’:<1%; ‘occasional’:1-10%; ‘frequent’:10-50%; ‘abundant’:50-90%; ‘continuous’:>90%.  Persistence for sporadic discharges: ‘rare’:<1/hr; ‘occasional’:1/min-1/hr; ‘frequent’:>1/min; ‘abundant’:>1/10 sec.  RPP=rhythmic and periodic patterns; GRDA=generalized rhythmic delta activity; FIRDA=frontal intermittent GRDA; LRDA=lateralized rhythmic delta activity; TIRDA=temporal intermittent rhythmic delta activity;  LPD=PLED=lateralized periodic discharges; GPD=generalized periodic discharges; BIPDs =bilateral independent periodic discharges; Mf=multifocal; SIRPDs=stimulus induced rhythmic, periodic, or ictal appearing discharges; BIRDs=brief potentially ictal rhythmic discharges >4 Hz, lasting .5-10s; PFA (paroxysmal bursts >13 Hz or =8 Hz <10s).  Modifiers: +F=with fast component; +S=with spike component; +R=with rhythmic component.  S-B=burst suppression pattern.  Max=maximal. N1-drowsy; N2-stage II sleep; N3-slow wave sleep. SSS/BETS=small sharp spikes/benign epileptiform transients of sleep. HV=hyperventilation; PS=photic stimulation]]]  Daily EEG Visual Analysis   FINDINGS:    Background: Symmetry: asymmetric Continuous: continuous PDR: absent Reactivity: present Voltage: normal, mostly 20-150uV Anterior Posterior Gradient: absent Breach: absent  Background Slowing: Generalized slowing: predominantly theta and delta waves Focal slowing: subtle R hemispheric attenuation was present.  State Changes:  -Stage II sleep transients were not recorded.  Sporadic Epileptiform Discharges:  None  Electrographic and Electroclinical seizures: None  Other Clinical Events: None  Activation Procedures:  -Hyperventilation was not performed.   -Photic stimulation was not performed.   Artifacts: Intermittent myogenic and movement artifacts were noted.  ECG: The heart rate on single channel ECG   EEG Summary / Classification:  Abnormal EEG Right hemispheric attenuation Background slowing, moderate generalized   EEG Impression / Clinical Correlate:  Abnormal prolonged EEG study. There is evidence of Right hemispheric abnormality Moderate generalized or multifocal brain dysfunction No epileptic discharges recorded. No seizures recorded.   Robert Peterson MD Neurology Resident     ________________________	 Tanner Buckley MD			   Attending Physician, [facility]   ------------------------------------ EEG Reading Room: [phone] On Call Service After Hours: [phone]  Normal vision: sees adequately in most situations; can see medication labels, newsprint glasses/Partially impaired: cannot see medication labels or newsprint, but can see obstacles in path, and the surrounding layout; can count fingers at arm's length

## 2023-01-26 NOTE — CARDIOLOGY SUMMARY
[de-identified] : 11/2021: CONCLUSIONS:\par 1. Mitral annular calcification, otherwise normal mitral\par valve. Minimal mitral regurgitation.\par 2. Calcified trileaflet aortic valve with normal opening.\par Moderate-severe aortic regurgitation.\par 3. Mild left atrial enlargement.\par 4. Normal left ventricular internal dimensions and wall\par thicknesses.\par 5. Endocardium not well visualized; grossly normal left\par ventricular systolic function.\par 6. Mild diastolic dysfunction (Stage I).\par 7. The right ventricle is not well visualized; grossly\par normal right ventricular systolic function.\par 8. Normal tricuspid valve. Minimal tricuspid regurgitation.\par 9. Estimated pulmonary artery systolic pressure equals 26\par mm Hg, assuming right atrial pressure equals 10  mm Hg,\par consistent with normal pulmonary pressures.\par *** Compared with echocardiogram of 4/23/2021, no\par significant changes noted.

## 2023-01-28 NOTE — REVIEW OF SYSTEMS
[Feeling Fatigued] : feeling fatigued [Weight Loss (___ Lbs)] : [unfilled] ~Ulb weight loss [Cough] : cough [Negative] : Heme/Lymph [Wheezing] : no wheezing [Coughing Up Blood] : no hemoptysis [Snoring] : no snoring

## 2023-01-28 NOTE — REASON FOR VISIT
[FreeTextEntry1] : Since her last visit, very complicated course which includes:\par \par 1. Aortic dissection sustained on 9/6/22 -- operated by Dr. Cabrera at [facility]\par 2, COVID and associated PNA, on O2 supplement support via NC 2-3L NC prn\par 3. Can only tolerate soft foods, was pending feeding tube\par 4. Currently staying in Rehab, has not yet seen CTS or thoracic sx since discharge.\par 5. saw pulm on tues, restarted on steroids\par \par This has been in addition to prior extensive course.\par She was in [facility] March 2022 \par PNA respiratory distress -- vent 2 weeks\par Went to [facility] rehab x 3 days\par Had PNA --> [facility]\par At [facility] had venodynes\par Rt ankle/foot --> open wound nonhealing\par on IV abx for OM (vanco, IV Invanz/Zosyn).\par s/p bone debridement/resection  Dr. Hooks\par \par Still on Eliquis, mexiletine Crestor\par metoprolol, hydralazine\par \par BPs are controlled\par /80  HR 99\par \par 1. PVCs: EKG reviewed today and reveals NSR without evidence of PVCs. Has long standing history of PVCs, suppressed with mexiletine use (recurrent symptoms when taken off in 2022). Resume mexiletine 150mg BID for continued PVC suppression. Last ECHO 2021 with normal LVEF. \par \par 2. HTN: resume oral antihypertensives as prescribed. Encouraged heart healthy diet, sodium restriction, and weight loss. Continue regular f/u with Cardiologist for further HTN management.\par \par 3. Paroxysmal afib: no recent afib noted. Remains on Eliquis for thromboembolic prophylaxis. \par \par 4. HLD: resume statin therapy as prescribed and regular f/u with Cardiologist for routine lipid monitoring and management.

## 2023-01-28 NOTE — PHYSICAL EXAM
[Normal Conjunctiva] : the conjunctiva exhibited no abnormalities [Eyelids - No Xanthelasma] : the eyelids demonstrated no xanthelasmas [Normal Oral Mucosa] : normal oral mucosa [No Oral Pallor] : no oral pallor [No Oral Cyanosis] : no oral cyanosis [Normal Jugular Venous A Waves Present] : normal jugular venous A waves present [Normal Jugular Venous V Waves Present] : normal jugular venous V waves present [No Jugular Venous Espino A Waves] : no jugular venous espino A waves [Respiration, Rhythm And Depth] : normal respiratory rhythm and effort [Exaggerated Use Of Accessory Muscles For Inspiration] : no accessory muscle use [Auscultation Breath Sounds / Voice Sounds] : lungs were clear to auscultation bilaterally [Heart Rate And Rhythm] : heart rate and rhythm were normal [Heart Sounds] : normal S1 and S2 [Murmurs] : no murmurs present [Abdomen Soft] : soft [Abdomen Tenderness] : non-tender [Abdomen Mass (___ Cm)] : no abdominal mass palpated [Abnormal Walk] : normal gait [Gait - Sufficient For Exercise Testing] : the gait was sufficient for exercise testing [Nail Clubbing] : no clubbing of the fingernails [Cyanosis, Localized] : no localized cyanosis [Petechial Hemorrhages (___cm)] : no petechial hemorrhages [Skin Color & Pigmentation] : normal skin color and pigmentation [] : no rash [No Venous Stasis] : no venous stasis [Skin Lesions] : no skin lesions [No Skin Ulcers] : no skin ulcer [No Xanthoma] : no  xanthoma was observed [Oriented To Time, Place, And Person] : oriented to person, place, and time [Affect] : the affect was normal [Mood] : the mood was normal [No Anxiety] : not feeling anxious [FreeTextEntry1] : thin, chronically-ill appearing, wheelchair

## 2023-01-31 ENCOUNTER — TRANSCRIPTION ENCOUNTER (OUTPATIENT)
Age: 73
End: 2023-01-31

## 2023-02-01 ENCOUNTER — TRANSCRIPTION ENCOUNTER (OUTPATIENT)
Age: 73
End: 2023-02-01

## 2023-02-01 NOTE — ED ADULT NURSE NOTE - CAS EDN DISCHARGE ASSESSMENT
[FreeTextEntry1] : YI MONTOYA 87 year F presents with HTN No interval complaints  reports she is fatigued. Tolerating medications well.  Tires more easily.  Echo pending LVEF last 65%. No presyncope. Continue losartan 50 mg/d. RTC 6m. SB 41 BPM. Wean metoprolol over 7d days QOD to stop. Stop lovastatin. RTC 3m. Total visit time 45min. 
Alert and oriented to person, place and time/Patient baseline mental status

## 2023-02-03 ENCOUNTER — APPOINTMENT (OUTPATIENT)
Dept: PULMONOLOGY | Facility: CLINIC | Age: 73
End: 2023-02-03
Payer: COMMERCIAL

## 2023-02-03 VITALS
RESPIRATION RATE: 17 BRPM | HEART RATE: 102 BPM | OXYGEN SATURATION: 99 % | DIASTOLIC BLOOD PRESSURE: 82 MMHG | WEIGHT: 129 LBS | SYSTOLIC BLOOD PRESSURE: 120 MMHG | BODY MASS INDEX: 20.25 KG/M2 | HEIGHT: 67 IN | TEMPERATURE: 97.7 F

## 2023-02-03 PROCEDURE — 96372 THER/PROPH/DIAG INJ SC/IM: CPT

## 2023-02-03 RX ORDER — TEZEPELUMAB-EKKO 210 MG/1.9ML
210 INJECTION, SOLUTION SUBCUTANEOUS
Qty: 0 | Refills: 0 | Status: COMPLETED | OUTPATIENT
Start: 2023-02-02

## 2023-02-10 ENCOUNTER — APPOINTMENT (OUTPATIENT)
Dept: CARDIOTHORACIC SURGERY | Facility: CLINIC | Age: 73
End: 2023-02-10
Payer: MEDICARE

## 2023-02-10 VITALS
HEIGHT: 67 IN | WEIGHT: 129 LBS | RESPIRATION RATE: 16 BRPM | TEMPERATURE: 98.3 F | DIASTOLIC BLOOD PRESSURE: 66 MMHG | HEART RATE: 94 BPM | SYSTOLIC BLOOD PRESSURE: 127 MMHG | BODY MASS INDEX: 20.25 KG/M2 | OXYGEN SATURATION: 94 %

## 2023-02-10 DIAGNOSIS — Z09 ENCOUNTER FOR FOLLOW-UP EXAMINATION AFTER COMPLETED TREATMENT FOR CONDITIONS OTHER THAN MALIGNANT NEOPLASM: ICD-10-CM

## 2023-02-10 PROCEDURE — 99213 OFFICE O/P EST LOW 20 MIN: CPT

## 2023-02-10 RX ORDER — VANCOMYCIN HYDROCHLORIDE 1 G/20ML
1 INJECTION, POWDER, LYOPHILIZED, FOR SOLUTION INTRAVENOUS
Refills: 0 | Status: COMPLETED | COMMUNITY
End: 2023-02-10

## 2023-02-14 NOTE — ED PROVIDER NOTE - IV ALTEPLASE ADMIN OUTSIDE HIDDEN
REASON FOR VISIT:  Chief Complaint   Patient presents with   • Office Visit     [facility]   • Eye Exam       HISTORY OF PRESENT ILLNESS: Annel Ibarra is a 35 year old female who presents for a comprehensive dilated eye examination and refraction for evaluation of ocular health and refractive error. Patient was last examined by me on February 9, 2022. She reports no ocular complaints. She feels her vision is stable. She uses artificial tear drops occasionally for dryness.     ASSESSMENT:  1. Myopia of both eyes with astigmatism    2. Anisocoria since childhood    3. History of laser refractive surgery        PLAN:  Orders Placed This Encounter   Procedures   • REFRACTION     -Prescription given for eyeglasses as written below. Recommend 100% UV protection when outdoors to lessen the risk of cataracts, macular degeneration, and eyelid skin cancers.   -Anisocoria has been present since childhood and is non-pathologic.  -Copy of note sent to [facility] per patient's request.    FOLLOWUP:  Return in about 1 year (around 2/14/2024) for eye exam.      FINAL Rx GLASSES:  Final Rx       Sphere Cylinder [place] [facility]    Right -1.25 +0.50 135 20/20    Left -1.75 +0.25 045 20/20    Expiration Date: 2/14/2024          I reviewed the allergies, medication list, and past medical, family, and social history sections listed in the medical record as well as any pertinent nursing/tech notes and recent labs.  
show

## 2023-02-23 NOTE — ED CDU PROVIDER INITIAL DAY NOTE - PROGRESS NOTE
Nephrology clinic follow up visit note    Chief complaint: CKD stage IIIb    HPI: 82-year-old female with past medical history of CAD status post CABG, hypertension, glaucoma, CKD stage IIIb with baseline creatinine 1.2-1.4, EGFR 37ml/min/1.73 m2 now came to follow up in the CKD clinic.      Interval history: Doing well. No new compaint. She denies any NSAID use.  She denies any nausea, vomiting, chest pain, fevers, shortness of breath , LE edema, dysuria or hematuria.    Review of systems:   Constitutional:  Negative except as documented in history of present illness.    Eye:  Negative except as documented in history of present illness.    Ear/Nose/Mouth/Throat:  Negative except as documented in history of present illness.    Cardiovascular:  Negative except as documented in history of present illness.    Respiratory:  Negative except as documented in history of present illness.    Gastrointestinal:  Negative except as documented in history of present illness.    Genitourinary:  Negative except as documented in history of present illness.    Musculoskeletal:  Negative except as documented in history of present illness.    Integumentary:  Negative except as documented in history of present illness.    Hematology/Lymphatics:  Negative except as documented in history of present illness.    Neurologic:  Negative except as documented in history of present illness.    Endocrine:  Negative except as documented in history of present illness.    Allergy/Immunologic:  Negative except as documented in history of present illness.    Psychiatric:  Negative except as documented in history of present illness.    All other systems ROS reviewed as documented in chart .     Past medical history: CAD s/p CABG, hypertension, glaucoma, CKD stage IIIb.  Past surgical history: Cataract surgery, bilateral breast surgery, , CABG.  Allergic history: No known drug allergies  Family history: Mother had CVA at age 54  Social history:  Former smoker, quit in 2016. No history of alcohol or drug abuse.    Current medications:  Allopurinol 100 mg daily  Rosuvastatin 10 mg once daily  Omeprazole 20 mg once daily  Calcium vitamin D 600-400 mg tablet once daily  Aricept 10 mg once daily  Cosopt eyedrops for glaucoma as directed  Aspirin 81 mg once daily  Toprol-XL 50 mg once daily  Lisinopril 5mg once daily      Physical exam:   /74mmHg, Pulse 54/min, RR 17/min, Temp. 99.0F  In no acute distress  HEENT: No icterus, no pallor, no scars lesions masses noted  Neck is supple, no JVD  Lungs: CTA B, no wheezes, rhonchi, crackles heard  CVS: S1-S2 positive  Abdomen: Soft, nontender, bowel sounds positive  Extremities: No edema, cyanosis or clubbing noted  Integumentary: No LE edema  Neurologically: No gross neurologic deficit  Psychiatry: Appropriate mood and effect.         Labs reviewed    Renal US 11/29/22:  FINDINGS: Urinary bladder is unremarkable with both ureteral jets   demonstrated.   Kidneys are normal in size and echogenicity with the right measuring 9.9 cm   and the left measuring 9.1 cm in length.    1.3 cm simple cyst in the upper right kidney measured 1 cm on prior. Mild   bilateral hydronephrosis. No solid renal lesion or renal calculi.   Impression:     Mild bilateral hydronephrosis with right new and left unchanged.    Relatively stable simple cyst in the right kidney.           Assessment and plan: 82-year-old female with past medical history of CAD status post CABG, hypertension, glaucoma, CKD stage IIIb with baseline creatinine 1.2-1.4, EGFR 37ml/min/1 7 3 m2 came to follow up in the CKD clinic.    #CKD stage IIIb A2:  S. Cr. 1.55<1.4  Baseline serum creatinine 1.2-1.4, EGFR 37 mL/min/1.73m2  CKD likely in presence of longstanding hypertension.  ?  Hypertensive nephrosclerosis  Urine protein creatinine ratio 104mg/g, HbA1C 6.0  reviewed renal US reviewed as above. Recommend repeat renal US in 1-2 years for cyst surveillance.  All  other work up including VIRGIL with reflex panel, HIV, chronic hepatitis panel, SPEP/UPEP with reflex RUBEN unremarkable.  Likely dehydrated with concentrated urine sample and hyaline casts.  Encouraged her to keep well-developed well-hydrated.  Avoid NSAIDs, nephrotoxins, IV contrast.    # ? diabetes:  HbA1c 6.0  Urine protein creatinine ratio 104mg/g.  On lisinopril 5mg po daily.  Will defer to Dr. Wong for glycemic control.    #Essential hypertension:  #CAD s/p CABG  Currently on aspirin 81 mg once daily, metoprolol XL 50 mg once daily, lisinopril 5mg once daily.  CAD management per cardiology clinic.  Will monitor trend and suggest changes accordingly.    #Anemia of CKD  Hemoglobin 12.6  Ferritin >40, % iron saturation 19  Age appropriate cancer screening per PCP.    #Bone and mineral disease of CKD:   Calcium 9.6, phosphorus 4.0  25-hydroxy vitamin D level >30ng/ml.    Primary CARE physician: Tere Roman CNP; Kevin Bose MD    Return to clinic in 3 months with repeat labs and imaging as above, or earlier if need arises.    Thank you for allowing me to take part in the care if this patient. Will follow along with you.  Please call with questions if any on .  MIPS: A) MIPS  : Most recent systolic blood pressure <140 mmHmmHg  : Most recent diastolic blood pressure < 90 mmHmmHg  : I ATTEST AND DOCUMENTED CURRENT MEDICATIONS IN THE MEDICAL RECORDS, UPDATED, OR REVIEWED THE PATIENT’S CURRENT MEDICATIONS.  1036F: Current tobacco non-user non smoker  Medications reviewed  BMI normal.    [facility]  [facility], [place], operated by [facility]   Professional Building   [address], [address]  [place] [address]  Contact: [phone], Fax: [phone]  Perfect Serve: Kaiden Morales.       The 21st Century Cures Act makes medical notes like these available to patients in the interest of transparency. Please be advised that this is a medical document. Medical documents are  intended to carry relevant information and the clinical opinion of the practitioner. The medical note is intended as medical provider to provider communication, and may appear blunt or direct. It is written in medical language, and may contain abbreviations or verbiage that are unfamiliar.      Stable. Improved.

## 2023-02-24 NOTE — PHYSICAL EXAM
[Sclera] : the sclera and conjunctiva were normal [PERRL With Normal Accommodation] : pupils were equal in size, round, and reactive to light [Neck Appearance] : the appearance of the neck was normal [] : no respiratory distress [Respiration, Rhythm And Depth] : normal respiratory rhythm and effort [Apical Impulse] : the apical impulse was normal [Heart Rate And Rhythm] : heart rate was normal and rhythm regular [Heart Sounds] : normal S1 and S2 [Murmurs] : no murmurs [Examination Of The Chest] : the chest was normal in appearance [2+] : left 2+ [Breast Appearance] : normal in appearance [Bowel Sounds] : normal bowel sounds [Abdomen Soft] : soft [No CVA Tenderness] : no ~M costovertebral angle tenderness [Involuntary Movements] : no involuntary movements were seen [Skin Color & Pigmentation] : normal skin color and pigmentation [Skin Turgor] : normal skin turgor [No Focal Deficits] : no focal deficits [Oriented To Time, Place, And Person] : oriented to person, place, and time [Impaired Insight] : insight and judgment were intact [Affect] : the affect was normal [Mood] : the mood was normal [Memory Recent] : recent memory was not impaired [Memory Remote] : remote memory was not impaired [FreeTextEntry1] : RT ankle ulcer \par Healed LT elbow and LT ankle

## 2023-02-24 NOTE — CONSULT LETTER
[Dear  ___] : Dear  [unfilled], [Courtesy Letter:] : I had the pleasure of seeing your patient, [unfilled], in my office today. [Please see my note below.] : Please see my note below. [Consult Closing:] : Thank you very much for allowing me to participate in the care of this patient.  If you have any questions, please do not hesitate to contact me. [Sincerely,] : Sincerely, [FreeTextEntry2] : Dr. Rico Glover [FreeTextEntry3] : Tan Cabrera MD\par Director\par [facility]\par Professor \par Cardiovascular & Thoracic Surgery\par [employer].\par \par

## 2023-02-24 NOTE — HISTORY OF PRESENT ILLNESS
[FreeTextEntry1] : Ms. RODRIGUEZ is a 72 year old female with recurrent hospitalization and complex medical history \par including Adrenal Insufficiency, Type I Aortic Dissection s/p Bentall Procedure / Hemiarch Replacement on 9/7/22 with  , Tracheobronchomalacia, Colon Cancer s/p Resection with Colostomy, Paroxysmal A. Fib on Eliquis, TIA, Seizures, DM 2, DVT, COPD, Fungemia on Fluconazole, MRSA on Bactrim, Right Foot Osteomyelitis and Pelvic Fracture presented to [place] ER from [facility] with fever, tachycardia and vomiting -- found to have aortic dissection so she was transferred from [facility] to [facility] for evaluation. No intervention or repeat imaging as per CTS or Vascular surgery, dissection likely chronic in  nature. Patient found to be septic likely 2/2 Asp vs HCAP and Pyelonephritis. Bacteremic with MRSE and urine culture + for Klebsiella and Proteus ESBL, RVP+ for RSV. Stress dose of steroids given 2/2 adrenal insufficiency. ID consulted, repeat Bcx NGTD, completed Abx course of Meropenem, passed TOV. Hx of dysphagia, underwent MBS recommending soft and bite sized solids with thin  liquids. PEG under consideration pending outpatient follow up with CT surgeon Dr. Zapien. Podiatry / Wound Care Consults appreciated for Left Heel / Right Elbow / Lower Back Ulcers. PT eval recommending [facility]. Medically stable for DC to [facility] with adherence to all medications as prescribed and outpatient follow up as scheduled/recommended. \par \par \par Today she presents and reports that she has sob with activities but she was started on steroids 2 days ago and she is feeling better. She is currently in Rehab and Rehab facility told her that they will send her to the hospital for shortness of breath but she refused. She also reports that rehab facility stated that if she is not getting better with steroid, she will be send to the hospital. She is wheel chair bound , walk with walker with one assist.  She is also on oxygen 2L via [place] . She also has RT ankle ulcer which is healing . Denies any chest pain, palpitations, dizziness, syncope, back pain or pedal edema. \par \par

## 2023-02-24 NOTE — ASSESSMENT
[FreeTextEntry1] : Ms. RODRIGUEZ is a 72 year old female with recurrent hospitalization and complex medical history \par including Adrenal Insufficiency, Type I Aortic Dissection s/p Bentall Procedure / Hemiarch Replacement on 9/7/22 with  , Tracheobronchomalacia, Colon Cancer s/p Resection with Colostomy, Paroxysmal A. Fib on Eliquis, TIA, Seizures, DM 2, DVT, COPD, Fungemia on Fluconazole, MRSA on Bactrim, Right Foot Osteomyelitis and Pelvic Fracture presented to [place] ER from [facility] with fever, tachycardia and vomiting -- found to have aortic dissection so she was transferred from [facility] [place] to [facility] for evaluation. No intervention or repeat imaging as per CTS or Vascular surgery, dissection likely chronic in  nature. Patient found to be septic likely 2/2 Asp vs HCAP and Pyelonephritis. Bacteremic with MRSE and urine culture + for Klebsiella and Proteus ESBL, RVP+ for RSV. Stress dose of steroids given 2/2 adrenal insufficiency. ID consulted, repeat Bcx NGTD, completed Abx course of Meropenem, passed TOV. Hx of dysphagia, underwent MBS recommending soft and bite sized solids with thin  liquids. PEG under consideration pending outpatient follow up with CT surgeon Dr. Zapien. Podiatry / Wound Care Consults appreciated for Left Heel / Right Elbow / Lower Back Ulcers. PT eval recommending [facility]. Medically stable for DC to [facility] with adherence to all medications as prescribed and outpatient follow up as scheduled/recommended. \par \par \par Today she presents and reports that she has sob with activities but she was started on steroids 2 days ago and she is feeling better. She is currently in Rehab and Rehab facility told her that they will send her to the hospital for shortness of breath but she refused. She also reports that rehab facility stated that if she is not getting better with steroid, she will be send to the hospital. She is wheel chair bound , walk with walker with one assist.  She is also on oxygen 2L via [place] . She also has RT ankle ulcer which is healing . Denies any chest pain, palpitations, dizziness, syncope, back pain or pedal edema. \par \par Today on exam patient's lungs clear bilaterally, normal sinus rhythm, sternum stable, incision clean, dry and intact.  No peripheral edema noted.\par \par \par Plan:\par 1) Continue current medication regimen\par 2) Follow up with cardiologist ( Dr.Joe Glover ) and PCP \par 3) CTA CAP in March 2023 for aortic surveillance \par 4) Follow up with EP ( Dr. Genesis shepard) \par 5) SBE antibiotic prophylaxis discussed at length \par 6) Follow up with ID\par 7) Advised her to get admitted in the  hospital if shortness of breath is not getting better given the history of COPD which she is currently refusing \par \par \par

## 2023-03-04 ENCOUNTER — INPATIENT (INPATIENT)
Facility: HOSPITAL | Age: 73
LOS: 4 days | Discharge: NURSING, REHAB OR LONG-TERM CARE | DRG: 177 | End: 2023-03-09
Attending: STUDENT IN AN ORGANIZED HEALTH CARE EDUCATION/TRAINING PROGRAM | Admitting: FAMILY MEDICINE
Payer: MEDICARE

## 2023-03-04 VITALS — WEIGHT: 130.07 LBS | HEIGHT: 64 IN

## 2023-03-04 DIAGNOSIS — H05.352 EXOSTOSIS OF LEFT ORBIT: Chronic | ICD-10-CM

## 2023-03-04 DIAGNOSIS — Z96.653 PRESENCE OF ARTIFICIAL KNEE JOINT, BILATERAL: Chronic | ICD-10-CM

## 2023-03-04 DIAGNOSIS — Z98.89 OTHER SPECIFIED POSTPROCEDURAL STATES: Chronic | ICD-10-CM

## 2023-03-04 DIAGNOSIS — Z98.890 OTHER SPECIFIED POSTPROCEDURAL STATES: Chronic | ICD-10-CM

## 2023-03-04 DIAGNOSIS — J18.9 PNEUMONIA, UNSPECIFIED ORGANISM: ICD-10-CM

## 2023-03-04 DIAGNOSIS — Z87.09 PERSONAL HISTORY OF OTHER DISEASES OF THE RESPIRATORY SYSTEM: Chronic | ICD-10-CM

## 2023-03-04 DIAGNOSIS — K62.5 HEMORRHAGE OF ANUS AND RECTUM: Chronic | ICD-10-CM

## 2023-03-04 LAB
ALBUMIN SERPL ELPH-MCNC: 3 G/DL — LOW (ref 3.3–5)
ALP SERPL-CCNC: 113 U/L — SIGNIFICANT CHANGE UP (ref 40–120)
ALT FLD-CCNC: 13 U/L — SIGNIFICANT CHANGE UP (ref 12–78)
ANION GAP SERPL CALC-SCNC: 5 MMOL/L — SIGNIFICANT CHANGE UP (ref 5–17)
ANISOCYTOSIS BLD QL: SIGNIFICANT CHANGE UP
AST SERPL-CCNC: 13 U/L — LOW (ref 15–37)
BASE EXCESS BLDV CALC-SCNC: -0.2 MMOL/L — SIGNIFICANT CHANGE UP
BASOPHILS # BLD AUTO: 0 K/UL — SIGNIFICANT CHANGE UP (ref 0–0.2)
BASOPHILS NFR BLD AUTO: 0 % — SIGNIFICANT CHANGE UP (ref 0–2)
BILIRUB SERPL-MCNC: 0.3 MG/DL — SIGNIFICANT CHANGE UP (ref 0.2–1.2)
BUN SERPL-MCNC: 12 MG/DL — SIGNIFICANT CHANGE UP (ref 7–23)
CALCIUM SERPL-MCNC: 9.2 MG/DL — SIGNIFICANT CHANGE UP (ref 8.5–10.1)
CHLORIDE SERPL-SCNC: 105 MMOL/L — SIGNIFICANT CHANGE UP (ref 96–108)
CO2 BLDV-SCNC: 20 MMOL/L — LOW (ref 22–26)
CO2 SERPL-SCNC: 28 MMOL/L — SIGNIFICANT CHANGE UP (ref 22–31)
CREAT SERPL-MCNC: 0.55 MG/DL — SIGNIFICANT CHANGE UP (ref 0.5–1.3)
DACRYOCYTES BLD QL SMEAR: SLIGHT — SIGNIFICANT CHANGE UP
EGFR: 96 ML/MIN/1.73M2 — SIGNIFICANT CHANGE UP
ELLIPTOCYTES BLD QL SMEAR: SLIGHT — SIGNIFICANT CHANGE UP
EOSINOPHIL # BLD AUTO: 0 K/UL — SIGNIFICANT CHANGE UP (ref 0–0.5)
EOSINOPHIL NFR BLD AUTO: 0 % — SIGNIFICANT CHANGE UP (ref 0–6)
FLUAV AG NPH QL: SIGNIFICANT CHANGE UP
FLUBV AG NPH QL: SIGNIFICANT CHANGE UP
GAS PNL BLDV: SIGNIFICANT CHANGE UP
GLUCOSE BLDC GLUCOMTR-MCNC: 157 MG/DL — HIGH (ref 70–99)
GLUCOSE BLDC GLUCOMTR-MCNC: 160 MG/DL — HIGH (ref 70–99)
GLUCOSE SERPL-MCNC: 196 MG/DL — HIGH (ref 70–99)
HCO3 BLDV-SCNC: 20 MMOL/L — LOW (ref 22–29)
HCT VFR BLD CALC: 39 % — SIGNIFICANT CHANGE UP (ref 34.5–45)
HGB BLD-MCNC: 11.6 G/DL — SIGNIFICANT CHANGE UP (ref 11.5–15.5)
LYMPHOCYTES # BLD AUTO: 1.43 K/UL — SIGNIFICANT CHANGE UP (ref 1–3.3)
LYMPHOCYTES # BLD AUTO: 17 % — SIGNIFICANT CHANGE UP (ref 13–44)
MACROCYTES BLD QL: SLIGHT — SIGNIFICANT CHANGE UP
MAGNESIUM SERPL-MCNC: 2 MG/DL — SIGNIFICANT CHANGE UP (ref 1.6–2.6)
MANUAL SMEAR VERIFICATION: SIGNIFICANT CHANGE UP
MCHC RBC-ENTMCNC: 20.9 PG — LOW (ref 27–34)
MCHC RBC-ENTMCNC: 29.7 GM/DL — LOW (ref 32–36)
MCV RBC AUTO: 70.4 FL — LOW (ref 80–100)
METAMYELOCYTES # FLD: 1 % — HIGH (ref 0–0)
MICROCYTES BLD QL: SIGNIFICANT CHANGE UP
MONOCYTES # BLD AUTO: 0.17 K/UL — SIGNIFICANT CHANGE UP (ref 0–0.9)
MONOCYTES NFR BLD AUTO: 2 % — SIGNIFICANT CHANGE UP (ref 2–14)
NEUTROPHILS # BLD AUTO: 6.74 K/UL — SIGNIFICANT CHANGE UP (ref 1.8–7.4)
NEUTROPHILS NFR BLD AUTO: 77 % — SIGNIFICANT CHANGE UP (ref 43–77)
NEUTS BAND # BLD: 3 % — SIGNIFICANT CHANGE UP (ref 0–8)
NRBC # BLD: 1 /100 — HIGH (ref 0–0)
NRBC # BLD: SIGNIFICANT CHANGE UP /100 WBCS (ref 0–0)
NT-PROBNP SERPL-SCNC: 227 PG/ML — HIGH (ref 0–125)
PCO2 BLDV: 21 MMHG — LOW (ref 39–42)
PH BLDV: 7.58 — HIGH (ref 7.32–7.43)
PLAT MORPH BLD: NORMAL — SIGNIFICANT CHANGE UP
PLATELET # BLD AUTO: 213 K/UL — SIGNIFICANT CHANGE UP (ref 150–400)
PLATELET COUNT - ESTIMATE: NORMAL — SIGNIFICANT CHANGE UP
PO2 BLDV: 228 MMHG — SIGNIFICANT CHANGE UP
POIKILOCYTOSIS BLD QL AUTO: SLIGHT — SIGNIFICANT CHANGE UP
POLYCHROMASIA BLD QL SMEAR: SLIGHT — SIGNIFICANT CHANGE UP
POTASSIUM SERPL-MCNC: 3.9 MMOL/L — SIGNIFICANT CHANGE UP (ref 3.5–5.3)
POTASSIUM SERPL-SCNC: 3.9 MMOL/L — SIGNIFICANT CHANGE UP (ref 3.5–5.3)
PROT SERPL-MCNC: 7.3 GM/DL — SIGNIFICANT CHANGE UP (ref 6–8.3)
RBC # BLD: 5.54 M/UL — HIGH (ref 3.8–5.2)
RBC # FLD: 20.5 % — HIGH (ref 10.3–14.5)
RBC BLD AUTO: ABNORMAL
RSV RNA NPH QL NAA+NON-PROBE: SIGNIFICANT CHANGE UP
SAO2 % BLDV: 99.5 % — SIGNIFICANT CHANGE UP
SARS-COV-2 RNA SPEC QL NAA+PROBE: SIGNIFICANT CHANGE UP
SCHISTOCYTES BLD QL AUTO: SLIGHT — SIGNIFICANT CHANGE UP
SODIUM SERPL-SCNC: 138 MMOL/L — SIGNIFICANT CHANGE UP (ref 135–145)
TARGETS BLD QL SMEAR: SLIGHT — SIGNIFICANT CHANGE UP
TROPONIN I, HIGH SENSITIVITY RESULT: 12.12 NG/L — SIGNIFICANT CHANGE UP
WBC # BLD: 8.42 K/UL — SIGNIFICANT CHANGE UP (ref 3.8–10.5)
WBC # FLD AUTO: 8.42 K/UL — SIGNIFICANT CHANGE UP (ref 3.8–10.5)

## 2023-03-04 PROCEDURE — 85025 COMPLETE CBC W/AUTO DIFF WBC: CPT

## 2023-03-04 PROCEDURE — 92610 EVALUATE SWALLOWING FUNCTION: CPT | Mod: GN

## 2023-03-04 PROCEDURE — 83735 ASSAY OF MAGNESIUM: CPT

## 2023-03-04 PROCEDURE — 87070 CULTURE OTHR SPECIMN AEROBIC: CPT

## 2023-03-04 PROCEDURE — 93010 ELECTROCARDIOGRAM REPORT: CPT

## 2023-03-04 PROCEDURE — 80048 BASIC METABOLIC PNL TOTAL CA: CPT

## 2023-03-04 PROCEDURE — 85027 COMPLETE CBC AUTOMATED: CPT

## 2023-03-04 PROCEDURE — 80053 COMPREHEN METABOLIC PANEL: CPT

## 2023-03-04 PROCEDURE — 99285 EMERGENCY DEPT VISIT HI MDM: CPT | Mod: CS

## 2023-03-04 PROCEDURE — 71250 CT THORAX DX C-: CPT | Mod: 26,MA

## 2023-03-04 PROCEDURE — 36415 COLL VENOUS BLD VENIPUNCTURE: CPT

## 2023-03-04 PROCEDURE — 82962 GLUCOSE BLOOD TEST: CPT

## 2023-03-04 PROCEDURE — 80202 ASSAY OF VANCOMYCIN: CPT

## 2023-03-04 PROCEDURE — 99223 1ST HOSP IP/OBS HIGH 75: CPT

## 2023-03-04 PROCEDURE — 94640 AIRWAY INHALATION TREATMENT: CPT

## 2023-03-04 PROCEDURE — 71045 X-RAY EXAM CHEST 1 VIEW: CPT | Mod: 26

## 2023-03-04 PROCEDURE — 83036 HEMOGLOBIN GLYCOSYLATED A1C: CPT

## 2023-03-04 RX ORDER — BUDESONIDE, MICRONIZED 100 %
0.5 POWDER (GRAM) MISCELLANEOUS
Refills: 0 | Status: DISCONTINUED | OUTPATIENT
Start: 2023-03-04 | End: 2023-03-09

## 2023-03-04 RX ORDER — ONDANSETRON 8 MG/1
4 TABLET, FILM COATED ORAL EVERY 6 HOURS
Refills: 0 | Status: DISCONTINUED | OUTPATIENT
Start: 2023-03-04 | End: 2023-03-04

## 2023-03-04 RX ORDER — TAMSULOSIN HYDROCHLORIDE 0.4 MG/1
0.4 CAPSULE ORAL AT BEDTIME
Refills: 0 | Status: DISCONTINUED | OUTPATIENT
Start: 2023-03-04 | End: 2023-03-09

## 2023-03-04 RX ORDER — ACETAMINOPHEN 500 MG
650 TABLET ORAL EVERY 6 HOURS
Refills: 0 | Status: DISCONTINUED | OUTPATIENT
Start: 2023-03-04 | End: 2023-03-09

## 2023-03-04 RX ORDER — VANCOMYCIN HCL 1 G
1000 VIAL (EA) INTRAVENOUS EVERY 12 HOURS
Refills: 0 | Status: DISCONTINUED | OUTPATIENT
Start: 2023-03-04 | End: 2023-03-05

## 2023-03-04 RX ORDER — ONDANSETRON 8 MG/1
4 TABLET, FILM COATED ORAL EVERY 6 HOURS
Refills: 0 | Status: DISCONTINUED | OUTPATIENT
Start: 2023-03-04 | End: 2023-03-09

## 2023-03-04 RX ORDER — FLUCONAZOLE 150 MG/1
400 TABLET ORAL DAILY
Refills: 0 | Status: DISCONTINUED | OUTPATIENT
Start: 2023-03-04 | End: 2023-03-05

## 2023-03-04 RX ORDER — DEXTROSE 50 % IN WATER 50 %
15 SYRINGE (ML) INTRAVENOUS ONCE
Refills: 0 | Status: DISCONTINUED | OUTPATIENT
Start: 2023-03-04 | End: 2023-03-09

## 2023-03-04 RX ORDER — MEROPENEM 1 G/30ML
1000 INJECTION INTRAVENOUS EVERY 8 HOURS
Refills: 0 | Status: DISCONTINUED | OUTPATIENT
Start: 2023-03-04 | End: 2023-03-09

## 2023-03-04 RX ORDER — ATORVASTATIN CALCIUM 80 MG/1
20 TABLET, FILM COATED ORAL AT BEDTIME
Refills: 0 | Status: DISCONTINUED | OUTPATIENT
Start: 2023-03-04 | End: 2023-03-09

## 2023-03-04 RX ORDER — SODIUM CHLORIDE 9 MG/ML
1000 INJECTION, SOLUTION INTRAVENOUS
Refills: 0 | Status: DISCONTINUED | OUTPATIENT
Start: 2023-03-04 | End: 2023-03-09

## 2023-03-04 RX ORDER — ALBUTEROL 90 UG/1
2 AEROSOL, METERED ORAL EVERY 6 HOURS
Refills: 0 | Status: DISCONTINUED | OUTPATIENT
Start: 2023-03-04 | End: 2023-03-04

## 2023-03-04 RX ORDER — ONDANSETRON 8 MG/1
4 TABLET, FILM COATED ORAL EVERY 8 HOURS
Refills: 0 | Status: DISCONTINUED | OUTPATIENT
Start: 2023-03-04 | End: 2023-03-09

## 2023-03-04 RX ORDER — DEXTROSE 50 % IN WATER 50 %
12.5 SYRINGE (ML) INTRAVENOUS ONCE
Refills: 0 | Status: DISCONTINUED | OUTPATIENT
Start: 2023-03-04 | End: 2023-03-09

## 2023-03-04 RX ORDER — ACETAMINOPHEN 500 MG
650 TABLET ORAL ONCE
Refills: 0 | Status: COMPLETED | OUTPATIENT
Start: 2023-03-04 | End: 2023-03-04

## 2023-03-04 RX ORDER — MAGNESIUM OXIDE 400 MG ORAL TABLET 241.3 MG
400 TABLET ORAL EVERY 8 HOURS
Refills: 0 | Status: DISCONTINUED | OUTPATIENT
Start: 2023-03-04 | End: 2023-03-09

## 2023-03-04 RX ORDER — ALBUTEROL 90 UG/1
1.25 AEROSOL, METERED ORAL EVERY 6 HOURS
Refills: 0 | Status: DISCONTINUED | OUTPATIENT
Start: 2023-03-04 | End: 2023-03-06

## 2023-03-04 RX ORDER — ACETYLCYSTEINE 200 MG/ML
4 VIAL (ML) MISCELLANEOUS EVERY 8 HOURS
Refills: 0 | Status: DISCONTINUED | OUTPATIENT
Start: 2023-03-04 | End: 2023-03-09

## 2023-03-04 RX ORDER — INSULIN LISPRO 100/ML
3 VIAL (ML) SUBCUTANEOUS
Refills: 0 | Status: DISCONTINUED | OUTPATIENT
Start: 2023-03-04 | End: 2023-03-09

## 2023-03-04 RX ORDER — GLUCAGON INJECTION, SOLUTION 0.5 MG/.1ML
1 INJECTION, SOLUTION SUBCUTANEOUS ONCE
Refills: 0 | Status: DISCONTINUED | OUTPATIENT
Start: 2023-03-04 | End: 2023-03-09

## 2023-03-04 RX ORDER — PANTOPRAZOLE SODIUM 20 MG/1
40 TABLET, DELAYED RELEASE ORAL
Refills: 0 | Status: DISCONTINUED | OUTPATIENT
Start: 2023-03-04 | End: 2023-03-09

## 2023-03-04 RX ORDER — VANCOMYCIN HCL 1 G
1000 VIAL (EA) INTRAVENOUS ONCE
Refills: 0 | Status: COMPLETED | OUTPATIENT
Start: 2023-03-04 | End: 2023-03-04

## 2023-03-04 RX ORDER — LANOLIN ALCOHOL/MO/W.PET/CERES
3 CREAM (GRAM) TOPICAL AT BEDTIME
Refills: 0 | Status: DISCONTINUED | OUTPATIENT
Start: 2023-03-04 | End: 2023-03-09

## 2023-03-04 RX ORDER — INSULIN GLARGINE 100 [IU]/ML
25 INJECTION, SOLUTION SUBCUTANEOUS AT BEDTIME
Refills: 0 | Status: DISCONTINUED | OUTPATIENT
Start: 2023-03-04 | End: 2023-03-05

## 2023-03-04 RX ORDER — POLYETHYLENE GLYCOL 3350 17 G/17G
17 POWDER, FOR SOLUTION ORAL
Refills: 0 | Status: DISCONTINUED | OUTPATIENT
Start: 2023-03-04 | End: 2023-03-09

## 2023-03-04 RX ORDER — TRAMADOL HYDROCHLORIDE 50 MG/1
50 TABLET ORAL EVERY 8 HOURS
Refills: 0 | Status: DISCONTINUED | OUTPATIENT
Start: 2023-03-04 | End: 2023-03-09

## 2023-03-04 RX ORDER — DEXTROSE 50 % IN WATER 50 %
25 SYRINGE (ML) INTRAVENOUS ONCE
Refills: 0 | Status: DISCONTINUED | OUTPATIENT
Start: 2023-03-04 | End: 2023-03-09

## 2023-03-04 RX ORDER — MEROPENEM 1 G/30ML
1000 INJECTION INTRAVENOUS ONCE
Refills: 0 | Status: COMPLETED | OUTPATIENT
Start: 2023-03-04 | End: 2023-03-04

## 2023-03-04 RX ORDER — APIXABAN 2.5 MG/1
5 TABLET, FILM COATED ORAL
Refills: 0 | Status: DISCONTINUED | OUTPATIENT
Start: 2023-03-04 | End: 2023-03-09

## 2023-03-04 RX ORDER — TIOTROPIUM BROMIDE 18 UG/1
2 CAPSULE ORAL; RESPIRATORY (INHALATION) DAILY
Refills: 0 | Status: DISCONTINUED | OUTPATIENT
Start: 2023-03-04 | End: 2023-03-09

## 2023-03-04 RX ORDER — MEXILETINE HYDROCHLORIDE 150 MG/1
200 CAPSULE ORAL EVERY 8 HOURS
Refills: 0 | Status: DISCONTINUED | OUTPATIENT
Start: 2023-03-04 | End: 2023-03-09

## 2023-03-04 RX ORDER — INSULIN LISPRO 100/ML
VIAL (ML) SUBCUTANEOUS
Refills: 0 | Status: DISCONTINUED | OUTPATIENT
Start: 2023-03-04 | End: 2023-03-09

## 2023-03-04 RX ADMIN — Medication 650 MILLIGRAM(S): at 14:14

## 2023-03-04 RX ADMIN — Medication 3 UNIT(S): at 18:07

## 2023-03-04 RX ADMIN — MEROPENEM 100 MILLIGRAM(S): 1 INJECTION INTRAVENOUS at 20:49

## 2023-03-04 RX ADMIN — ALBUTEROL 1.25 MILLIGRAM(S): 90 AEROSOL, METERED ORAL at 23:35

## 2023-03-04 RX ADMIN — Medication 8 MILLIGRAM(S): at 18:07

## 2023-03-04 RX ADMIN — Medication 250 MILLIGRAM(S): at 21:34

## 2023-03-04 RX ADMIN — MAGNESIUM OXIDE 400 MG ORAL TABLET 400 MILLIGRAM(S): 241.3 TABLET ORAL at 20:48

## 2023-03-04 RX ADMIN — Medication 1: at 18:07

## 2023-03-04 RX ADMIN — MEXILETINE HYDROCHLORIDE 200 MILLIGRAM(S): 150 CAPSULE ORAL at 21:03

## 2023-03-04 RX ADMIN — MEROPENEM 100 MILLIGRAM(S): 1 INJECTION INTRAVENOUS at 13:19

## 2023-03-04 RX ADMIN — Medication 0.5 MILLIGRAM(S): at 20:24

## 2023-03-04 RX ADMIN — APIXABAN 5 MILLIGRAM(S): 2.5 TABLET, FILM COATED ORAL at 18:08

## 2023-03-04 RX ADMIN — ATORVASTATIN CALCIUM 20 MILLIGRAM(S): 80 TABLET, FILM COATED ORAL at 20:48

## 2023-03-04 RX ADMIN — FLUCONAZOLE 400 MILLIGRAM(S): 150 TABLET ORAL at 18:08

## 2023-03-04 RX ADMIN — INSULIN GLARGINE 25 UNIT(S): 100 INJECTION, SOLUTION SUBCUTANEOUS at 21:13

## 2023-03-04 RX ADMIN — Medication 1 TABLET(S): at 18:07

## 2023-03-04 RX ADMIN — POLYETHYLENE GLYCOL 3350 17 GRAM(S): 17 POWDER, FOR SOLUTION ORAL at 20:48

## 2023-03-04 RX ADMIN — Medication 250 MILLIGRAM(S): at 14:13

## 2023-03-04 RX ADMIN — Medication 4 MILLILITER(S): at 23:33

## 2023-03-04 RX ADMIN — TAMSULOSIN HYDROCHLORIDE 0.4 MILLIGRAM(S): 0.4 CAPSULE ORAL at 20:48

## 2023-03-04 NOTE — ED PROVIDER NOTE - OBJECTIVE STATEMENT
72 year old female with PMHx of tracheal malaise, Afib on Eliquis, COPD on 3L home O2, adrenal insufficience, asthma, tracheobronchomalacia, colorectal cancer, seizure, DVT, TIA, DM, and aortic dissection in September presents to the ED BIBEMS from [facility] complaining of SOB, KRAMER, and cough x 2 weeks. Describes feeling like "someone is sitting on her chest." Denies chest pain. Pt states 1 week ago she spit up dark green phlegm, went to PCP who cultured her and it grew Proteus, and was put on Doxycycline without relief. Pt had 2 normal chest x-rays from PCP, so was told to go to ED for further evaluation. Per daughter, pt's last PNA was in January. Daughter is health care proxy.

## 2023-03-04 NOTE — H&P ADULT - NSICDXPASTSURGICALHX_GEN_ALL_CORE_FT
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)- course complicated by obstruction, respiratory failure, multiple CPR attempts -  stent discontinued; 10/20/2016 Tracheobronchoplasty (Prolene Mesh) performed at [facility] by Dr Zapien    Rectal bleeding exam under anesthesia ([facility]) 2/2018    S/P bronchoscopy 6/5/2018 - [facility] (Dr Zapien) no evidence of tracheobronchomalacia in trachea or bronchial tubes

## 2023-03-04 NOTE — ED ADULT NURSE NOTE - OBJECTIVE STATEMENT
Pt reports chest pain , productive cough , sob and KRAMER . Pt is on 3L on NC . Pt has extensive medical history

## 2023-03-04 NOTE — H&P ADULT - ASSESSMENT
72 year old female with history of DM, CAD, Afib, Asthma on chronic steroids, colon cancer sp resection and recent aortic dissection repair 9/22 presents with worsening sob and cough.     # ESBL Pneumonia with Proteus  -  Admit to medicine  - Start Meropenem   - Add Vanco   - FU repeat sputum culture  - [facility]  ESBL Proteus  - ID consult  - Pulmonary consult  - Sputum for legionella and strep  - FU blood cultures  - Tylenol for fever      # Severe persistent Asthma  - On chronic steroids  - Continue Medrol   - Acetylcysteine  - Continue Albuterol  - Add spiriva  - On budesonide BID via neb    # Afib  - Continue eliquis  - Continue mexiletine  - Monitor on remote tele    # DM  - Continue Basal bolus  - Check A1c    # CAD  - Stable  - Continue atorvastatin    # Bladder outlet obstruction  - Continue flomax    # History of MRSA and Candidaemia   - Continue fluconazole and bactrim for now   - Unsure in documentation why she is on this    # GERD  - Continue protonix    # DVT prophylaxis  - Eliquis      # Disposition: Full code

## 2023-03-04 NOTE — ED ADULT TRIAGE NOTE - ISOLATION TYPE:
None [Itching] : itching [Skin: A Rash] : rash: [Chest  Pressure] : no chest pressure [Shortness Of Breath] : no shortness of breath [Leg Claudication] : no intermittent leg claudication [Lower Ext Edema] : no extremity edema [Skin Lesions] : no skin lesions

## 2023-03-04 NOTE — H&P ADULT - HISTORY OF PRESENT ILLNESS
72 year old female with history of DM, CAD, Afib, Asthma on chronic steroids, colon cancer sp resection and recent aortic dissection repair 9/22 presents with worsening sob and cough. Patient reports she was being treated for pneumonia at [facility] with Invanz for ESBL proteus however she remained febrile for the past week being on this antibiotic. She was sent into the ED for further evaluation.

## 2023-03-04 NOTE — ED PROVIDER NOTE - CLINICAL SUMMARY MEDICAL DECISION MAKING FREE TEXT BOX
Elderly female with multiple co-morbidities coming in with PNA failing outpatient antibiotics. Vitals normal, not hypoxic, febrile to 101. Pt with audible congestion, frequent cough, and rhonchi. Plan: Antibiotics, rule out ACS with Troponin, CT chest to evaluate for PNA, blood gas, and admit.

## 2023-03-04 NOTE — PATIENT PROFILE ADULT - FALL HARM RISK - HARM RISK INTERVENTIONS

## 2023-03-04 NOTE — H&P ADULT - NSHPLABSRESULTS_GEN_ALL_CORE
LABS:                            11.6   8.42  )-----------( 213      ( 04 Mar 2023 11:45 )             39.0     03-04    138  |  105  |  12  ----------------------------<  196<H>  3.9   |  28  |  0.55    Ca    9.2      04 Mar 2023 12:27  Mg     2.0     03-04    TPro  7.3  /  Alb  3.0<L>  /  TBili  0.3  /  DBili  x   /  AST  13<L>  /  ALT  13  /  AlkPhos  113  03-04        LIVER FUNCTIONS - ( 04 Mar 2023 12:27 )  Alb: 3.0 g/dL / Pro: 7.3 gm/dL / ALK PHOS: 113 U/L / ALT: 13 U/L / AST: 13 U/L / GGT: x             Trop 12.12    EKG Bifascicular block

## 2023-03-04 NOTE — ED PROVIDER NOTE - NSICDXPASTSURGICALHX_GEN_ALL_CORE_FT
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    H/O pelvic surgery 5 years ago - s/p fracture    H/O total knee replacement, bilateral 5 years ago    History of partial hysterectomy 30 years ago - fibroids    History of sinus surgery multiple sinus surgeries    History of tracheomalacia 2015 - attempted tracheal stenting ([facility])- course complicated by obstruction, respiratory failure, multiple CPR attempts -  stent discontinued; 10/20/2016 Tracheobronchoplasty (Prolene Mesh) performed at [facility] by Dr Zapien    Rectal bleeding exam under anesthesia ([facility]) 2/2018    S/P bronchoscopy 6/5/2018 - [facility] (Dr Zapien) no evidence of tracheobronchomalacia in trachea or bronchial tubes

## 2023-03-05 LAB
A1C WITH ESTIMATED AVERAGE GLUCOSE RESULT: 10.3 % — HIGH (ref 4–5.6)
ALBUMIN SERPL ELPH-MCNC: 2.7 G/DL — LOW (ref 3.3–5)
ALP SERPL-CCNC: 106 U/L — SIGNIFICANT CHANGE UP (ref 40–120)
ALT FLD-CCNC: 11 U/L — LOW (ref 12–78)
ANION GAP SERPL CALC-SCNC: 4 MMOL/L — LOW (ref 5–17)
AST SERPL-CCNC: 16 U/L — SIGNIFICANT CHANGE UP (ref 15–37)
BILIRUB SERPL-MCNC: 0.2 MG/DL — SIGNIFICANT CHANGE UP (ref 0.2–1.2)
BUN SERPL-MCNC: 15 MG/DL — SIGNIFICANT CHANGE UP (ref 7–23)
CALCIUM SERPL-MCNC: 9.1 MG/DL — SIGNIFICANT CHANGE UP (ref 8.5–10.1)
CHLORIDE SERPL-SCNC: 106 MMOL/L — SIGNIFICANT CHANGE UP (ref 96–108)
CO2 SERPL-SCNC: 25 MMOL/L — SIGNIFICANT CHANGE UP (ref 22–31)
CREAT SERPL-MCNC: 0.68 MG/DL — SIGNIFICANT CHANGE UP (ref 0.5–1.3)
EGFR: 91 ML/MIN/1.73M2 — SIGNIFICANT CHANGE UP
ESTIMATED AVERAGE GLUCOSE: 249 MG/DL — HIGH (ref 68–114)
GLUCOSE BLDC GLUCOMTR-MCNC: 263 MG/DL — HIGH (ref 70–99)
GLUCOSE BLDC GLUCOMTR-MCNC: 264 MG/DL — HIGH (ref 70–99)
GLUCOSE BLDC GLUCOMTR-MCNC: 275 MG/DL — HIGH (ref 70–99)
GLUCOSE BLDC GLUCOMTR-MCNC: 390 MG/DL — HIGH (ref 70–99)
GLUCOSE SERPL-MCNC: 328 MG/DL — HIGH (ref 70–99)
GRAM STN FLD: SIGNIFICANT CHANGE UP
HCT VFR BLD CALC: 36.7 % — SIGNIFICANT CHANGE UP (ref 34.5–45)
HGB BLD-MCNC: 10.8 G/DL — LOW (ref 11.5–15.5)
MCHC RBC-ENTMCNC: 20.6 PG — LOW (ref 27–34)
MCHC RBC-ENTMCNC: 29.4 GM/DL — LOW (ref 32–36)
MCV RBC AUTO: 70 FL — LOW (ref 80–100)
PLATELET # BLD AUTO: 239 K/UL — SIGNIFICANT CHANGE UP (ref 150–400)
POTASSIUM SERPL-MCNC: 4.4 MMOL/L — SIGNIFICANT CHANGE UP (ref 3.5–5.3)
POTASSIUM SERPL-SCNC: 4.4 MMOL/L — SIGNIFICANT CHANGE UP (ref 3.5–5.3)
PROT SERPL-MCNC: 6.7 GM/DL — SIGNIFICANT CHANGE UP (ref 6–8.3)
RBC # BLD: 5.24 M/UL — HIGH (ref 3.8–5.2)
RBC # FLD: 20.1 % — HIGH (ref 10.3–14.5)
SODIUM SERPL-SCNC: 135 MMOL/L — SIGNIFICANT CHANGE UP (ref 135–145)
SPECIMEN SOURCE: SIGNIFICANT CHANGE UP
WBC # BLD: 6.27 K/UL — SIGNIFICANT CHANGE UP (ref 3.8–10.5)
WBC # FLD AUTO: 6.27 K/UL — SIGNIFICANT CHANGE UP (ref 3.8–10.5)

## 2023-03-05 PROCEDURE — 99232 SBSQ HOSP IP/OBS MODERATE 35: CPT

## 2023-03-05 RX ORDER — VANCOMYCIN HCL 1 G
750 VIAL (EA) INTRAVENOUS EVERY 12 HOURS
Refills: 0 | Status: DISCONTINUED | OUTPATIENT
Start: 2023-03-05 | End: 2023-03-08

## 2023-03-05 RX ORDER — COLLAGENASE CLOSTRIDIUM HIST. 250 UNIT/G
1 OINTMENT (GRAM) TOPICAL DAILY
Refills: 0 | Status: DISCONTINUED | OUTPATIENT
Start: 2023-03-05 | End: 2023-03-09

## 2023-03-05 RX ORDER — INSULIN GLARGINE 100 [IU]/ML
30 INJECTION, SOLUTION SUBCUTANEOUS AT BEDTIME
Refills: 0 | Status: DISCONTINUED | OUTPATIENT
Start: 2023-03-05 | End: 2023-03-06

## 2023-03-05 RX ADMIN — Medication 1 TABLET(S): at 09:29

## 2023-03-05 RX ADMIN — TIOTROPIUM BROMIDE 2 PUFF(S): 18 CAPSULE ORAL; RESPIRATORY (INHALATION) at 07:56

## 2023-03-05 RX ADMIN — MEROPENEM 100 MILLIGRAM(S): 1 INJECTION INTRAVENOUS at 05:53

## 2023-03-05 RX ADMIN — Medication 8 MILLIGRAM(S): at 09:29

## 2023-03-05 RX ADMIN — APIXABAN 5 MILLIGRAM(S): 2.5 TABLET, FILM COATED ORAL at 05:53

## 2023-03-05 RX ADMIN — Medication 1 APPLICATION(S): at 17:08

## 2023-03-05 RX ADMIN — ALBUTEROL 1.25 MILLIGRAM(S): 90 AEROSOL, METERED ORAL at 20:40

## 2023-03-05 RX ADMIN — Medication 3: at 12:41

## 2023-03-05 RX ADMIN — Medication 3 UNIT(S): at 12:41

## 2023-03-05 RX ADMIN — MEROPENEM 100 MILLIGRAM(S): 1 INJECTION INTRAVENOUS at 14:31

## 2023-03-05 RX ADMIN — MEXILETINE HYDROCHLORIDE 200 MILLIGRAM(S): 150 CAPSULE ORAL at 21:15

## 2023-03-05 RX ADMIN — Medication 3: at 08:46

## 2023-03-05 RX ADMIN — ALBUTEROL 1.25 MILLIGRAM(S): 90 AEROSOL, METERED ORAL at 07:51

## 2023-03-05 RX ADMIN — Medication 0.5 MILLIGRAM(S): at 07:50

## 2023-03-05 RX ADMIN — MEXILETINE HYDROCHLORIDE 200 MILLIGRAM(S): 150 CAPSULE ORAL at 05:55

## 2023-03-05 RX ADMIN — MEXILETINE HYDROCHLORIDE 200 MILLIGRAM(S): 150 CAPSULE ORAL at 14:30

## 2023-03-05 RX ADMIN — PANTOPRAZOLE SODIUM 40 MILLIGRAM(S): 20 TABLET, DELAYED RELEASE ORAL at 05:53

## 2023-03-05 RX ADMIN — ALBUTEROL 1.25 MILLIGRAM(S): 90 AEROSOL, METERED ORAL at 23:15

## 2023-03-05 RX ADMIN — ALBUTEROL 1.25 MILLIGRAM(S): 90 AEROSOL, METERED ORAL at 13:28

## 2023-03-05 RX ADMIN — MAGNESIUM OXIDE 400 MG ORAL TABLET 400 MILLIGRAM(S): 241.3 TABLET ORAL at 14:31

## 2023-03-05 RX ADMIN — Medication 0.5 MILLIGRAM(S): at 20:40

## 2023-03-05 RX ADMIN — MAGNESIUM OXIDE 400 MG ORAL TABLET 400 MILLIGRAM(S): 241.3 TABLET ORAL at 05:55

## 2023-03-05 RX ADMIN — MEROPENEM 100 MILLIGRAM(S): 1 INJECTION INTRAVENOUS at 21:10

## 2023-03-05 RX ADMIN — MAGNESIUM OXIDE 400 MG ORAL TABLET 400 MILLIGRAM(S): 241.3 TABLET ORAL at 22:26

## 2023-03-05 RX ADMIN — TAMSULOSIN HYDROCHLORIDE 0.4 MILLIGRAM(S): 0.4 CAPSULE ORAL at 21:08

## 2023-03-05 RX ADMIN — APIXABAN 5 MILLIGRAM(S): 2.5 TABLET, FILM COATED ORAL at 17:31

## 2023-03-05 RX ADMIN — Medication 3 UNIT(S): at 08:46

## 2023-03-05 RX ADMIN — POLYETHYLENE GLYCOL 3350 17 GRAM(S): 17 POWDER, FOR SOLUTION ORAL at 09:30

## 2023-03-05 RX ADMIN — Medication 4 MILLILITER(S): at 23:15

## 2023-03-05 RX ADMIN — Medication 4 MILLILITER(S): at 07:51

## 2023-03-05 RX ADMIN — Medication 3 UNIT(S): at 17:30

## 2023-03-05 RX ADMIN — Medication 250 MILLIGRAM(S): at 22:26

## 2023-03-05 RX ADMIN — Medication 5: at 17:31

## 2023-03-05 RX ADMIN — INSULIN GLARGINE 30 UNIT(S): 100 INJECTION, SOLUTION SUBCUTANEOUS at 22:25

## 2023-03-05 RX ADMIN — ATORVASTATIN CALCIUM 20 MILLIGRAM(S): 80 TABLET, FILM COATED ORAL at 21:09

## 2023-03-05 RX ADMIN — POLYETHYLENE GLYCOL 3350 17 GRAM(S): 17 POWDER, FOR SOLUTION ORAL at 21:09

## 2023-03-05 RX ADMIN — Medication 250 MILLIGRAM(S): at 11:08

## 2023-03-05 NOTE — SWALLOW BEDSIDE ASSESSMENT ADULT - SLP GENERAL OBSERVATIONS
pt lying in bed; awake and alert and verbally interactive, coherent and able to maintain a conversation with old and new information and with appropriate turn-taking.  pt appears thin and somewhat frail but was able to lift herself up in bed, and serve herself the po samples.

## 2023-03-05 NOTE — SWALLOW BEDSIDE ASSESSMENT ADULT - COMMENTS
72  year old female with history of DM, CAD, Afib, Asthma on chronic steroids, colon cancer sp resection and recent aortic dissection repair 9/22 presents with worsening sob and cough. Patient reports she was being treated for pneumonia at [facility] with Invanz for ESBL proteus however she remained febrile for the past week being on this antibiotic. She was sent into the ED for further evaluation.   Note also : pt has a hx tracheomalacia. As per Nsg, pt is well aware of what she can and cannot eat, tends to eat soft foods and takes her medications crushed .  pt is seen by service for po intake and determine appropriate level of diet. 72  year old female with history of DM, CAD, Afib, Asthma on chronic steroids, colon cancer sp resection and recent aortic dissection repair 9/22 presents with worsening sob and cough. Patient reports she was being treated for pneumonia at [facility] with Invanz for ESBL proteus however she remained febrile for the past week being on this antibiotic. She was sent into the ED for further evaluation.   Note also : pt has a hx tracheomalacia. As per Nsg, pt is well aware of what she can and cannot eat, tends to eat soft foods and takes her medications crushed in pudding. She states she is "apple-sauced out" .  pt is seen by Service for po intake and determine appropriate level of diet.

## 2023-03-05 NOTE — CONSULT NOTE ADULT - ASSESSMENT
73 y/o female with h/o DM type 2, CAD, A.fib, asthma on chronic steroids, colon cancer s/p resection, aortic dissection repair 9/22 was admitted on 3/4 for worsening sob and cough. Patient reports she was being treated for pneumonia at [facility] with Invanz for ESBL proteus however she remained febrile for the past week despite being on this antibiotic. She was sent into the ED for further evaluation. In ER she was noted with low grade fever to 100.1F and received meropenem, vancomycin IV, fluconazole IV and bactrim PO.    1. Low grade fever. Possible sepsis. Right base pneumonia. Possible aspiration pneumonia. Allergy to PCN, cefepime (anaphylaxis).  -mucoid impaction  -reported febrile despite IV abx therapy with ertapenem ?cause ?MDRO ?drug fever  -obtain BC x 2, sputum c/s  -abx choice is limited to multiple abx allergies  -start meropenem 1 gm IV q8h and vancomycin 750 mg IV q12h  -reason for abx use and side effects reviewed with patient; monitor BMP and vancomycin trough levels   -monitor closely in donnie of PCN allergy history  -respiratory care  -old chart reviewed to assess prior cultures  -monitor temps  -f/u CBC  -supportive care  2. Other issues:   -care per medicine             73 y/o female with h/o DM type 2, CAD, A.fib, asthma on chronic steroids, colon cancer s/p resection, aortic dissection repair 9/22 was admitted on 3/4 for worsening sob and cough. Patient reports she was being treated for pneumonia at [facility] with Invanz for ESBL proteus however she remained febrile for the past week despite being on this antibiotic. She was sent into the ED for further evaluation. In ER she was noted with low grade fever to 100.1F and received meropenem, vancomycin IV, fluconazole IV and bactrim PO.    1. Low grade fever. Possible sepsis. Right base pneumonia. Possible aspiration pneumonia. Allergy to PCN, cefepime (anaphylaxis).  -mucoid impaction  -reported febrile despite IV abx therapy with ertapenem ?cause ?MDRO ?drug fever  -obtain BC x 2, sputum c/s  -abx choice is limited to multiple abx allergies  -start meropenem 1 gm IV q8h and vancomycin 750 mg IV q12h  -reason for abx use and side effects reviewed with patient; monitor BMP and vancomycin trough levels   -monitor closely in donnie of PCN allergy history  -pulmonary evaluation  -respiratory care  -old chart reviewed to assess prior cultures  -monitor temps  -f/u CBC  -supportive care  2. Other issues:   -care per medicine

## 2023-03-05 NOTE — PROGRESS NOTE ADULT - SUBJECTIVE AND OBJECTIVE BOX
CC:     Vital Signs Last 24 Hrs  T(F): 97.5 (05 Mar 2023 15:27), Max: 99 (04 Mar 2023 23:33)  HR: 79 (05 Mar 2023 15:27) (75 - 89)  BP: 136/83 (05 Mar 2023 15:27) (105/56 - 136/83)  RR: 18 (05 Mar 2023 15:27) (16 - 18)  SpO2: 99% (05 Mar 2023 15:27) (94% - 100%)/RA   Constitutional: NAD, awake and alert  HEENT: PERR, EOMI  Neck: Soft and supple,  No JVD  Respiratory: Breath sounds are clear bilaterally, No wheezing, rales or rhonchi  Cardiovascular: S1 and S2, regular rate and rhythm, no Murmurs  Gastrointestinal: Bowel Sounds present, soft, nontender, nondistended  Extremities: No peripheral edema  Vascular: 2+ peripheral pulses  Neurological: A/O x 3, no focal deficits  Musculoskeletal: 5/5 strength b/l upper and lower extremities  Skin: No rashes    MEDICATIONS:  MEDICATIONS  (STANDING):  acetylcysteine 10%  Inhalation 4 milliLiter(s) Inhalation every 8 hours  albuterol   0.042% 1.25 milliGRAM(s) Nebulizer every 6 hours  apixaban 5 milliGRAM(s) Oral two times a day  atorvastatin 20 milliGRAM(s) Oral at bedtime  buDESOnide    Inhalation Suspension 0.5 milliGRAM(s) Inhalation two times a day  collagenase Ointment 1 Application(s) Topical daily  insulin glargine Injectable (LANTUS) 25 Unit(s) SubCutaneous at bedtime  insulin lispro (ADMELOG) corrective regimen sliding scale   SubCutaneous three times a day before meals  insulin lispro Injectable (ADMELOG) 3 Unit(s) SubCutaneous three times a day before meals  magnesium oxide 400 milliGRAM(s) Oral every 8 hours  meropenem  IVPB 1000 milliGRAM(s) IV Intermittent every 8 hours  methylPREDNISolone 8 milliGRAM(s) Oral daily  mexiletine 200 milliGRAM(s) Oral every 8 hours  pantoprazole    Tablet 40 milliGRAM(s) Oral before breakfast  polyethylene glycol 3350 17 Gram(s) Oral two times a day  tamsulosin 0.4 milliGRAM(s) Oral at bedtime  tiotropium 2.5 MICROgram(s) Inhaler 2 Puff(s) Inhalation daily  trimethoprim   80 mG/sulfamethoxazole 400 mG 1 Tablet(s) Oral daily  vancomycin  IVPB 750 milliGRAM(s) IV Intermittent every 12 hours      LABS: All Labs Reviewed:                        10.8   6.27  )-----------( 239      ( 05 Mar 2023 06:35 )             36.7   135  |  106  |  15  ----------------------------<  328<H>  4.4   |  25  |  0.68  Ca    9.1      05 Mar 2023 06:35  Mg     2.0     03-04  TPro  6.7  /  Alb  2.7<L>  /  TBili  0.2  /  DBili  x   /  AST  16  /  ALT  11<L>  /  AlkPhos  106  03-05      RADIOLOGY/EKG:  < from: CT Chest No Cont (03.04.23 @ 13:53) >  In comparison with 12/14/2022, interval decrease of bilateral mucoid   impactions, tree-in-bud opacities and consolidative opacity within left   lower lobe. No new opacity.    < end of copied text >   CC: 72 year old female with history of DM, CAD, Afib, Asthma on chronic steroids, colon cancer sp resection and recent aortic dissection repair 9/22 presents with worsening sob and cough. Patient reports she was being treated for pneumonia at [facility] with Invanz for ESBL proteus however she remained febrile for the past week being on this antibiotic. She was sent into the ED for further evaluation.     3/5 - some cough, occ green to brown sputum; reports that she has chr asthma and is always on steroids; retired      Vital Signs Last 24 Hrs  T(F): 97.5 (05 Mar 2023 15:27), Max: 99 (04 Mar 2023 23:33)  HR: 79 (05 Mar 2023 15:27) (75 - 89)  BP: 136/83 (05 Mar 2023 15:27) (105/56 - 136/83)  RR: 18 (05 Mar 2023 15:27) (16 - 18)  SpO2: 99% (05 Mar 2023 15:27) (94% - 100%)/RA   Constitutional: NAD, awake and alert  HEENT: PERR, EOMI  Neck: Soft and supple,  No JVD  Respiratory: Breath sounds are clear bilaterally, No wheezing, rales or rhonchi  Cardiovascular: S1 and S2, regular rate and rhythm, no Murmurs  Gastrointestinal: Bowel Sounds present, soft, nontender, nondistended  Extremities: No peripheral edema  Vascular: 2+ peripheral pulses  Neurological: A/O x 3, no focal deficits  Musculoskeletal: 5/5 strength b/l upper and lower extremities  Skin: No rashes    MEDICATIONS:  MEDICATIONS  (STANDING):  acetylcysteine 10%  Inhalation 4 milliLiter(s) Inhalation every 8 hours  albuterol   0.042% 1.25 milliGRAM(s) Nebulizer every 6 hours  apixaban 5 milliGRAM(s) Oral two times a day  atorvastatin 20 milliGRAM(s) Oral at bedtime  buDESOnide    Inhalation Suspension 0.5 milliGRAM(s) Inhalation two times a day  collagenase Ointment 1 Application(s) Topical daily  insulin glargine Injectable (LANTUS) 25 Unit(s) SubCutaneous at bedtime  insulin lispro (ADMELOG) corrective regimen sliding scale   SubCutaneous three times a day before meals  insulin lispro Injectable (ADMELOG) 3 Unit(s) SubCutaneous three times a day before meals  magnesium oxide 400 milliGRAM(s) Oral every 8 hours  meropenem  IVPB 1000 milliGRAM(s) IV Intermittent every 8 hours  methylPREDNISolone 8 milliGRAM(s) Oral daily  mexiletine 200 milliGRAM(s) Oral every 8 hours  pantoprazole    Tablet 40 milliGRAM(s) Oral before breakfast  polyethylene glycol 3350 17 Gram(s) Oral two times a day  tamsulosin 0.4 milliGRAM(s) Oral at bedtime  tiotropium 2.5 MICROgram(s) Inhaler 2 Puff(s) Inhalation daily  trimethoprim   80 mG/sulfamethoxazole 400 mG 1 Tablet(s) Oral daily  vancomycin  IVPB 750 milliGRAM(s) IV Intermittent every 12 hours      LABS: All Labs Reviewed:                        10.8   6.27  )-----------( 239      ( 05 Mar 2023 06:35 )             36.7   135  |  106  |  15  ----------------------------<  328<H>  4.4   |  25  |  0.68  Ca    9.1      05 Mar 2023 06:35  Mg     2.0     03-04  TPro  6.7  /  Alb  2.7<L>  /  TBili  0.2  /  DBili  x   /  AST  16  /  ALT  11<L>  /  AlkPhos  106  03-05      RADIOLOGY/EKG:  < from: CT Chest No Cont (03.04.23 @ 13:53) >  In comparison with 12/14/2022, interval decrease of bilateral mucoid   impactions, tree-in-bud opacities and consolidative opacity within left   lower lobe. No new opacity.    < end of copied text >

## 2023-03-05 NOTE — SWALLOW BEDSIDE ASSESSMENT ADULT - NS SPL SWALLOW CLINIC TRIAL FT
pt demonstrates no oral-pharyngeal contraindication for regular texture diet.  pt is well aware of her limitations, and is able to work around them.  Maintain regular texture. Set up tray as necessary.  Disc with pt and with NSg.  Service will not follow. Please re-consult prn.

## 2023-03-05 NOTE — CONSULT NOTE ADULT - SUBJECTIVE AND OBJECTIVE BOX
Patient is a 72y old  Female who presents with a chief complaint of Cough    HPI:  73 y/o female with h/o DM type 2, CAD, A.fib, asthma on chronic steroids, colon cancer s/p resection, aortic dissection repair 9/22 was admitted on 3/4 for worsening sob and cough. Patient reports she was being treated for pneumonia at [facility] with Invanz for ESBL proteus however she remained febrile for the past week despite being on this antibiotic. She was sent into the ED for further evaluation. In ER she was noted with low grade fever to 100.1F and received meropenem, vancomycin IV, fluconazole PO and bactrim PO.    PMH: as above  PSH: as above  Meds: per reconciliation sheet, noted below  MEDICATIONS  (STANDING):  acetylcysteine 10%  Inhalation 4 milliLiter(s) Inhalation every 8 hours  albuterol   0.042% 1.25 milliGRAM(s) Nebulizer every 6 hours  apixaban 5 milliGRAM(s) Oral two times a day  atorvastatin 20 milliGRAM(s) Oral at bedtime  buDESOnide    Inhalation Suspension 0.5 milliGRAM(s) Inhalation two times a day  dextrose 5%. 1000 milliLiter(s) (50 mL/Hr) IV Continuous <Continuous>  dextrose 5%. 1000 milliLiter(s) (100 mL/Hr) IV Continuous <Continuous>  dextrose 50% Injectable 25 Gram(s) IV Push once  dextrose 50% Injectable 12.5 Gram(s) IV Push once  dextrose 50% Injectable 25 Gram(s) IV Push once  fluconAZOLE   Tablet 400 milliGRAM(s) Oral daily  glucagon  Injectable 1 milliGRAM(s) IntraMuscular once  insulin glargine Injectable (LANTUS) 25 Unit(s) SubCutaneous at bedtime  insulin lispro (ADMELOG) corrective regimen sliding scale   SubCutaneous three times a day before meals  insulin lispro Injectable (ADMELOG) 3 Unit(s) SubCutaneous three times a day before meals  magnesium oxide 400 milliGRAM(s) Oral every 8 hours  meropenem  IVPB 1000 milliGRAM(s) IV Intermittent every 8 hours  methylPREDNISolone 8 milliGRAM(s) Oral daily  mexiletine 200 milliGRAM(s) Oral every 8 hours  pantoprazole    Tablet 40 milliGRAM(s) Oral before breakfast  polyethylene glycol 3350 17 Gram(s) Oral two times a day  tamsulosin 0.4 milliGRAM(s) Oral at bedtime  tiotropium 2.5 MICROgram(s) Inhaler 2 Puff(s) Inhalation daily  trimethoprim   80 mG/sulfamethoxazole 400 mG 1 Tablet(s) Oral daily  vancomycin  IVPB 1000 milliGRAM(s) IV Intermittent every 12 hours    MEDICATIONS  (PRN):  acetaminophen     Tablet .. 650 milliGRAM(s) Oral every 6 hours PRN Temp greater or equal to 38C (100.4F), Moderate Pain (4 - 6)  aluminum hydroxide/magnesium hydroxide/simethicone Suspension 30 milliLiter(s) Oral every 4 hours PRN Dyspepsia  dextrose Oral Gel 15 Gram(s) Oral once PRN Blood Glucose LESS THAN 70 milliGRAM(s)/deciliter  melatonin 3 milliGRAM(s) Oral at bedtime PRN Insomnia  ondansetron   Disintegrating Tablet 4 milliGRAM(s) Oral every 6 hours PRN Nausea and/or Vomiting  ondansetron Injectable 4 milliGRAM(s) IV Push every 8 hours PRN Nausea and/or Vomiting  traMADol 50 milliGRAM(s) Oral every 8 hours PRN Severe Pain (7 - 10)    Allergies    aspirin (Short breath)  Avelox (Short breath; Pruritus)  cefepime (Anaphylaxis)  codeine (Short breath)  Dilaudid (Short breath)  iodine (Short breath; Swelling)  penicillin (Anaphylaxis)  shellfish (Anaphylaxis)  tetanus toxoid (Short breath)  Valium (Short breath)    Intolerances    Social: no smoking, no alcohol, no illegal drugs; no recent travel, no exposure to TB  FAMILY HISTORY:  Family history of asthma  Family history of breast cancer (Sibling)  Family history of diabetes mellitus type II    ROS: the patient denies fever, no chills, no HA, no seizures, no dizziness, no sore throat, no nasal congestion, no blurry vision, no CP, no palpitations, no SOB, no cough, no abdominal pain, no diarrhea, no N/V, no dysuria, no leg pain, no claudication, no rash, no joint aches, no rectal pain or bleeding, no night sweats  All other systems reviewed and are negative    Vital Signs Last 24 Hrs  T(C): 37.2 (04 Mar 2023 23:33), Max: 37.8 (04 Mar 2023 10:51)  T(F): 99 (04 Mar 2023 23:33), Max: 100.1 (04 Mar 2023 10:51)  HR: 89 (04 Mar 2023 23:33) (89 - 94)  BP: 105/56 (04 Mar 2023 23:33) (105/56 - 130/74)  BP(mean): 91 (04 Mar 2023 10:51) (91 - 91)  RR: 16 (04 Mar 2023 23:33) (16 - 18)  SpO2: 99% (04 Mar 2023 23:33) (94% - 99%)    Parameters below as of 04 Mar 2023 16:46  Patient On (Oxygen Delivery Method): nasal cannula  O2 Flow (L/min): 3    Daily Height in cm: 162.56 (04 Mar 2023 10:46)    Daily     PE:    Constitutional:  No acute distress  HEENT: NC/AT, EOMI, PERRLA, conjunctivae clear; ears and nose atraumatic; pharynx benign  Neck: supple; thyroid not palpable  Back: no tenderness  Respiratory: respiratory effort normal; crackles at bases  Cardiovascular: S1S2 regular, no murmurs  Abdomen: soft, not tender, not distended, positive BS; no liver or spleen organomegaly  Genitourinary: no suprapubic tenderness  Lymphatic: no LN palpable  Musculoskeletal: no muscle tenderness, no joint swelling or tenderness  Extremities: no pedal edema  Neurological/ Psychiatric: AxOx3, judgement and insight normal; moving all extremities  Skin: no rashes; no palpable lesions    Labs: all available labs reviewed                        10.8   6.27  )-----------( 239      ( 05 Mar 2023 06:35 )             36.7     03-05    135  |  106  |  15  ----------------------------<  328<H>  4.4   |  25  |  0.68    Ca    9.1      05 Mar 2023 06:35  Mg     2.0     03-04    TPro  6.7  /  Alb  2.7<L>  /  TBili  0.2  /  DBili  x   /  AST  16  /  ALT  11<L>  /  AlkPhos  106  03-05     LIVER FUNCTIONS - ( 05 Mar 2023 06:35 )  Alb: 2.7 g/dL / Pro: 6.7 gm/dL / ALK PHOS: 106 U/L / ALT: 11 U/L / AST: 16 U/L / GGT: x               Radiology: all available radiological tests reviewed    < from: CT Chest No Cont (03.04.23 @ 13:53) >  In comparison with 12/14/2022, interval decrease of bilateral mucoid   impactions, tree-in-bud opacities and consolidative opacity within left   lower lobe. No new opacity.  < end of copied text >    < from: Xray Chest 1 View- PORTABLE-Urgent (03.04.23 @ 11:32) >  IMPRESSION: Patchy infiltrate at the right base for which follow-up   radiographs aresuggested.  < end of copied text >    Advanced directives addressed: full resuscitation
Chief Complaint: fever    HPI (per admission H & P):  72 year old female with history of DM, CAD, Afib, Asthma on chronic steroids, colon cancer sp resection and recent aortic dissection repair 9/22 presents with worsening sob and cough. Patient reports she was being treated for pneumonia at [facility] with Invanz for ESBL proteus however she remained febrile for the past week being on this antibiotic. She was sent into the ED for further evaluation.    (04 Mar 2023 16:07)    3/5/2023: Patient resting comfortably. Intermittent cough especially when taking a deep breath. Denies hemoptysis. Scant sputum production. Patient reports she was having chest discomfort at [facility] but currently denies any pain.       Patient is followed as an outpatient by Dr. Allison (pulmonologist in [place]).       PAST MEDICAL & SURGICAL HISTORY:  Atrial fibrillation  paroxysmal, on eliquis      Diabetes  Type 2      COPD (chronic obstructive pulmonary disease)      Adrenal insufficiency  Medrol daily for over 50 years      Aortic insufficiency  moderate AR on echo 5/3/2018      Pelvic fracture      Asthma      Tracheobronchomalacia  diagnosed 2015, s/p bronchial thermoplasty 2016 (Dr Zapien); bronchoscopy 6/5/2018 revealed no evidence of tracheobronchomalacia in trachea or bronchial tubes      Colorectal cancer  4/2018- last treatment , chemo and radiation      Rectal bleeding      Seizure  x 1 1/7/18      DVT (deep venous thrombosis)  15-20 years ago, took coumadin      TIA (transient ischemic attack)  multiple, last 5 years ago - presents as right-sided weakness      History of partial hysterectomy  30 years ago - fibroids      H/O total knee replacement, bilateral  5 years ago      History of sinus surgery  multiple sinus surgeries      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      H/O pelvic surgery  5 years ago - s/p fracture      History of tracheomalacia  2015 - attempted tracheal stenting ([facility])- course complicated by obstruction, respiratory failure, multiple CPR attempts -  stent discontinued; 10/20/2016 Tracheobronchoplasty (Prolene Mesh) performed at [facility] by Dr Zapien      S/P bronchoscopy  6/5/2018 - [place] (Dr Zapien) no evidence of tracheobronchomalacia in trachea or bronchial tubes      Rectal bleeding  exam under anesthesia ([facility]) 2/2018          REVIEW OF SYSTEMS:    As in HPI  All other review of systems is negative unless indicated above    Vital Signs Last 24 Hrs  T(C): 36.7 (05 Mar 2023 08:54), Max: 37.2 (04 Mar 2023 23:33)  T(F): 98.1 (05 Mar 2023 08:54), Max: 99 (04 Mar 2023 23:33)  HR: 75 (05 Mar 2023 08:54) (75 - 94)  BP: 119/67 (05 Mar 2023 08:54) (105/56 - 119/67)  BP(mean): --  RR: 18 (05 Mar 2023 08:54) (16 - 18)  SpO2: 100% (05 Mar 2023 08:54) (95% - 100%)    Parameters below as of 05 Mar 2023 08:54  Patient On (Oxygen Delivery Method): nasal cannula        I&O's Summary      PHYSICAL EXAM:    Constitutional: NAD, awake and alert, well-developed  Neck: Soft and supple, No LAD, No JVD  Respiratory: Breath sounds are equal bilaterally, bilateral rhonchi  Cardiovascular: S1 and S2, irregular rate and rhythm  Gastrointestinal: Bowel Sounds present, soft, nontender, nondistended, no guarding, no rebound  Extremities: No peripheral edema  Neurological: A/O x 3  Skin: No rashes    Medications:  MEDICATIONS  (STANDING):  acetylcysteine 10%  Inhalation 4 milliLiter(s) Inhalation every 8 hours  albuterol   0.042% 1.25 milliGRAM(s) Nebulizer every 6 hours  apixaban 5 milliGRAM(s) Oral two times a day  atorvastatin 20 milliGRAM(s) Oral at bedtime  buDESOnide    Inhalation Suspension 0.5 milliGRAM(s) Inhalation two times a day  dextrose 5%. 1000 milliLiter(s) (50 mL/Hr) IV Continuous <Continuous>  dextrose 5%. 1000 milliLiter(s) (100 mL/Hr) IV Continuous <Continuous>  dextrose 50% Injectable 25 Gram(s) IV Push once  dextrose 50% Injectable 12.5 Gram(s) IV Push once  dextrose 50% Injectable 25 Gram(s) IV Push once  glucagon  Injectable 1 milliGRAM(s) IntraMuscular once  insulin glargine Injectable (LANTUS) 25 Unit(s) SubCutaneous at bedtime  insulin lispro (ADMELOG) corrective regimen sliding scale   SubCutaneous three times a day before meals  insulin lispro Injectable (ADMELOG) 3 Unit(s) SubCutaneous three times a day before meals  magnesium oxide 400 milliGRAM(s) Oral every 8 hours  meropenem  IVPB 1000 milliGRAM(s) IV Intermittent every 8 hours  methylPREDNISolone 8 milliGRAM(s) Oral daily  mexiletine 200 milliGRAM(s) Oral every 8 hours  pantoprazole    Tablet 40 milliGRAM(s) Oral before breakfast  polyethylene glycol 3350 17 Gram(s) Oral two times a day  tamsulosin 0.4 milliGRAM(s) Oral at bedtime  tiotropium 2.5 MICROgram(s) Inhaler 2 Puff(s) Inhalation daily  trimethoprim   80 mG/sulfamethoxazole 400 mG 1 Tablet(s) Oral daily  vancomycin  IVPB 750 milliGRAM(s) IV Intermittent every 12 hours      Labs: All Labs Reviewed:                        10.8   6.27  )-----------( 239      ( 05 Mar 2023 06:35 )             36.7     03-05    135  |  106  |  15  ----------------------------<  328<H>  4.4   |  25  |  0.68    Ca    9.1      05 Mar 2023 06:35  Mg     2.0     03-04    TPro  6.7  /  Alb  2.7<L>  /  TBili  0.2  /  DBili  x   /  AST  16  /  ALT  11<L>  /  AlkPhos  106  03-05          Blood Culture:     RADIOLOGY:      EXAM:  CT CHEST   ORDERED BY: SHERRY MARX   PROCEDURE DATE:  03/04/2023        INTERPRETATION:  INDICATION: Abnormal chest CT  TECHNIQUE: Unenhanced CT of the chest. Coronal, sagittal, and MIP images   were reconstructed and reviewed.  COMPARISON: 12/14/2022 chest CT.    FINDINGS:    AIRWAYS, LUNGS and PLEURA: Patent central airways. Mild paraseptal   emphysema. In comparison with 12/14/2022, interval decrease of bilateral   mucoid impactions, tree-in-bud opacities and consolidative opacity within   left lower lobe. No new opacity. No pleural effusion.    LYMPH NODES, MEDIASTINUM AND MARANDA: No lymphadenopathy.    HEART AND VESSELS: Heart size is normal. No pericardial effusion. Status   post aortic valve and ascending aorta replacement. Mild coronary   calcifications.    VISUALIZED UPPER ABDOMEN: Within normal limits.    CHEST WALL AND BONES: No aggressive osseous lesion. Intact sternotomy.    LOWER NECK: Within normal limits.    IMPRESSION:    In comparison with 12/14/2022, interval decrease of bilateral mucoid   impactions, tree-in-bud opacities and consolidative opacity within left   lower lobe. No new opacity.      NABILA BROWN MD; Attending Radiologist          Assessment/Plan: 1. Fever. Patient was treated with Invanz at the rehab facility for positive sputum culture ([facility]). Patient transferred to  presumably for persistent fever. Chest CT scan does not show findings suggestive of pneumonia. D/W  (ID).Continue antibiotics per ID pending culture results. Consider non-pulmonary source of infection if fever persists.     2. Severe, chronic persistent asthma. Continue current treatment. Patient is chronically on steroids; methylprednisolone 8 mg daily. Continue inhaled bronchodilators/ICS.     3. Hx of tracheomalacia - S/P tracheobronchoplasty. Continue mucolytics (acetylcysteine).    4. Atrial fib. On Eliquis.     5. Hx of Aortic Insufficiency/ASHD. On Eliquis. No signs of decompensated cardiac disease. Continue meds - per hospitalist team.     6. DM and other comorbidities - per hospitalist team.         Time Span: 60 minutes

## 2023-03-05 NOTE — SWALLOW BEDSIDE ASSESSMENT ADULT - SWALLOW EVAL: RECOMMENDED FEEDING/EATING TECHNIQUES
allow for swallow between intakes allow for swallow between intakes/alternate food with liquid/crush medication (when feasible)/hard swallow w/ each bite or sip/maintain upright posture during/after eating for 30 mins/no straws/position upright (90 degrees)/small sips/bites

## 2023-03-05 NOTE — PROGRESS NOTE ADULT - ASSESSMENT
72 year old female, retired ,  with history of DM, CAD, Afib, Asthma on chronic steroids, colon cancer sp resection and recent aortic dissection repair 9/22 presents with worsening sob and cough.     # ESBL Pneumonia with Proteus on Invanz at [facility]  Patient sent to  for low-grade fever   H/o Severe Chronic persistent asthma  H/O Tracheomalacia s/p tracheobronchoplasty   - seen by ID and Pulm  current on IV meropenem for suspected persistent pneumonia   also on vancomycin IV  FU repeat sputum culture  - Sputum for legionella and strep  - FU blood cultures  - cont chronic steroids and inhalers  and acetylcysteine       # Afib  - Continue eliquis  - Continue mexiletine  - Monitor on remote tele today - can likely dc tele tmr     # DM  - Continue Basal bolus  - Check A1c    # CAD  - Stable  - Continue atorvastatin    # Bladder outlet obstruction  - Continue flomax    # History of MRSA and Candidaemia   - Continue fluconazole and bactrim for now       # GERD  - Continue protonix    # DVT prophylaxis  - Eliquis     72 year old female, retired ,  with history of DM, CAD, Afib, Asthma on chronic steroids, colon cancer sp resection and recent aortic dissection repair 9/22 presents with worsening sob and cough.     # ESBL Pneumonia with Proteus on Invanz at [facility]  Patient sent to  for low-grade fever   H/o Severe Chronic persistent asthma  H/O Tracheomalacia s/p tracheobronchoplasty   - seen by ID and Pulm  current on IV meropenem for suspected persistent pneumonia   also on vancomycin IV  FU repeat sputum culture  - Sputum for legionella and strep  - FU blood cultures  - cont chronic steroids and inhalers  and acetylcysteine       # Afib  - Continue eliquis  - Continue mexiletine  - Monitor on remote tele today - can likely dc tele tmr     # DM  - Continue Basal bolus  - Check A1c    # CAD  - Stable  - Continue atorvastatin    # Bladder outlet obstruction  - Continue flomax    # History of MRSA and Candidaemia   - Continue fluconazole and bactrim for now       # GERD  - Continue protonix    # DVT/CVA  prophylaxis  - Eliquis     72 year old female, retired ,  with history of DM, CAD, Afib, Asthma on chronic steroids, colon cancer sp resection and recent aortic dissection repair 9/22 presents with worsening sob and cough.     # ESBL Pneumonia with Proteus on Invanz at [facility]  Patient sent to  for low-grade fever   H/o Severe Chronic persistent asthma  H/O Tracheomalacia s/p tracheobronchoplasty   - seen by ID and Pulm  current on IV meropenem for suspected persistent pneumonia   also on vancomycin IV  FU repeat sputum culture  - Sputum for legionella and strep  - FU blood cultures  - cont chronic steroids and inhalers  and acetylcysteine       # Afib  - Continue eliquis  - Continue mexiletine  - Monitor on remote tele today - can likely dc tele tmr     # DM  - Continue Basal bolus  - note elevated A1C, FS high - will increase lantus from 25 to 30 qhs     # CAD  - Stable  - Continue atorvastatin    # Bladder outlet obstruction  - Continue flomax    # History of MRSA and Candidaemia   - Continue fluconazole and bactrim for now       # GERD  - Continue protonix    # DVT/CVA  prophylaxis  - Eliquis

## 2023-03-06 ENCOUNTER — NON-APPOINTMENT (OUTPATIENT)
Age: 73
End: 2023-03-06

## 2023-03-06 LAB
ANION GAP SERPL CALC-SCNC: 3 MMOL/L — LOW (ref 5–17)
ANISOCYTOSIS BLD QL: SIGNIFICANT CHANGE UP
BASOPHILS # BLD AUTO: 0 K/UL — SIGNIFICANT CHANGE UP (ref 0–0.2)
BASOPHILS NFR BLD AUTO: 0 % — SIGNIFICANT CHANGE UP (ref 0–2)
BUN SERPL-MCNC: 13 MG/DL — SIGNIFICANT CHANGE UP (ref 7–23)
CALCIUM SERPL-MCNC: 9.2 MG/DL — SIGNIFICANT CHANGE UP (ref 8.5–10.1)
CHLORIDE SERPL-SCNC: 106 MMOL/L — SIGNIFICANT CHANGE UP (ref 96–108)
CO2 SERPL-SCNC: 29 MMOL/L — SIGNIFICANT CHANGE UP (ref 22–31)
CREAT SERPL-MCNC: 0.6 MG/DL — SIGNIFICANT CHANGE UP (ref 0.5–1.3)
EGFR: 94 ML/MIN/1.73M2 — SIGNIFICANT CHANGE UP
ELLIPTOCYTES BLD QL SMEAR: SLIGHT — SIGNIFICANT CHANGE UP
EOSINOPHIL # BLD AUTO: 0.07 K/UL — SIGNIFICANT CHANGE UP (ref 0–0.5)
EOSINOPHIL NFR BLD AUTO: 1 % — SIGNIFICANT CHANGE UP (ref 0–6)
GLUCOSE BLDC GLUCOMTR-MCNC: 164 MG/DL — HIGH (ref 70–99)
GLUCOSE BLDC GLUCOMTR-MCNC: 198 MG/DL — HIGH (ref 70–99)
GLUCOSE BLDC GLUCOMTR-MCNC: 312 MG/DL — HIGH (ref 70–99)
GLUCOSE BLDC GLUCOMTR-MCNC: 354 MG/DL — HIGH (ref 70–99)
GLUCOSE SERPL-MCNC: 225 MG/DL — HIGH (ref 70–99)
HCT VFR BLD CALC: 36.6 % — SIGNIFICANT CHANGE UP (ref 34.5–45)
HGB BLD-MCNC: 11 G/DL — LOW (ref 11.5–15.5)
HYPOCHROMIA BLD QL: SLIGHT — SIGNIFICANT CHANGE UP
LYMPHOCYTES # BLD AUTO: 1.51 K/UL — SIGNIFICANT CHANGE UP (ref 1–3.3)
LYMPHOCYTES # BLD AUTO: 22 % — SIGNIFICANT CHANGE UP (ref 13–44)
MAGNESIUM SERPL-MCNC: 2 MG/DL — SIGNIFICANT CHANGE UP (ref 1.6–2.6)
MANUAL SMEAR VERIFICATION: SIGNIFICANT CHANGE UP
MCHC RBC-ENTMCNC: 20.9 PG — LOW (ref 27–34)
MCHC RBC-ENTMCNC: 30.1 GM/DL — LOW (ref 32–36)
MCV RBC AUTO: 69.6 FL — LOW (ref 80–100)
MICROCYTES BLD QL: SIGNIFICANT CHANGE UP
MONOCYTES # BLD AUTO: 0.82 K/UL — SIGNIFICANT CHANGE UP (ref 0–0.9)
MONOCYTES NFR BLD AUTO: 12 % — SIGNIFICANT CHANGE UP (ref 2–14)
NEUTROPHILS # BLD AUTO: 4.05 K/UL — SIGNIFICANT CHANGE UP (ref 1.8–7.4)
NEUTROPHILS NFR BLD AUTO: 59 % — SIGNIFICANT CHANGE UP (ref 43–77)
NRBC # BLD: 1 /100 — HIGH (ref 0–0)
NRBC # BLD: SIGNIFICANT CHANGE UP /100 WBCS (ref 0–0)
OVALOCYTES BLD QL SMEAR: SLIGHT — SIGNIFICANT CHANGE UP
PLAT MORPH BLD: NORMAL — SIGNIFICANT CHANGE UP
PLATELET # BLD AUTO: 220 K/UL — SIGNIFICANT CHANGE UP (ref 150–400)
POIKILOCYTOSIS BLD QL AUTO: SLIGHT — SIGNIFICANT CHANGE UP
POTASSIUM SERPL-MCNC: 3.9 MMOL/L — SIGNIFICANT CHANGE UP (ref 3.5–5.3)
POTASSIUM SERPL-SCNC: 3.9 MMOL/L — SIGNIFICANT CHANGE UP (ref 3.5–5.3)
RBC # BLD: 5.26 M/UL — HIGH (ref 3.8–5.2)
RBC # FLD: 19.7 % — HIGH (ref 10.3–14.5)
RBC BLD AUTO: ABNORMAL
SCHISTOCYTES BLD QL AUTO: SLIGHT — SIGNIFICANT CHANGE UP
SODIUM SERPL-SCNC: 138 MMOL/L — SIGNIFICANT CHANGE UP (ref 135–145)
TARGETS BLD QL SMEAR: SIGNIFICANT CHANGE UP
VANCOMYCIN TROUGH SERPL-MCNC: 13.6 UG/ML — SIGNIFICANT CHANGE UP (ref 10–20)
VARIANT LYMPHS # BLD: 6 % — SIGNIFICANT CHANGE UP (ref 0–6)
WBC # BLD: 6.86 K/UL — SIGNIFICANT CHANGE UP (ref 3.8–10.5)
WBC # FLD AUTO: 6.86 K/UL — SIGNIFICANT CHANGE UP (ref 3.8–10.5)

## 2023-03-06 PROCEDURE — 99232 SBSQ HOSP IP/OBS MODERATE 35: CPT

## 2023-03-06 RX ORDER — ALBUTEROL 90 UG/1
1.25 AEROSOL, METERED ORAL EVERY 6 HOURS
Refills: 0 | Status: DISCONTINUED | OUTPATIENT
Start: 2023-03-06 | End: 2023-03-09

## 2023-03-06 RX ORDER — INSULIN GLARGINE 100 [IU]/ML
34 INJECTION, SOLUTION SUBCUTANEOUS AT BEDTIME
Refills: 0 | Status: DISCONTINUED | OUTPATIENT
Start: 2023-03-06 | End: 2023-03-09

## 2023-03-06 RX ADMIN — MAGNESIUM OXIDE 400 MG ORAL TABLET 400 MILLIGRAM(S): 241.3 TABLET ORAL at 13:45

## 2023-03-06 RX ADMIN — MEROPENEM 100 MILLIGRAM(S): 1 INJECTION INTRAVENOUS at 22:14

## 2023-03-06 RX ADMIN — ALBUTEROL 1.25 MILLIGRAM(S): 90 AEROSOL, METERED ORAL at 15:07

## 2023-03-06 RX ADMIN — Medication 0.5 MILLIGRAM(S): at 09:57

## 2023-03-06 RX ADMIN — Medication 4 MILLILITER(S): at 23:02

## 2023-03-06 RX ADMIN — Medication 5: at 17:09

## 2023-03-06 RX ADMIN — Medication 1: at 11:54

## 2023-03-06 RX ADMIN — ATORVASTATIN CALCIUM 20 MILLIGRAM(S): 80 TABLET, FILM COATED ORAL at 22:18

## 2023-03-06 RX ADMIN — Medication 1 APPLICATION(S): at 12:24

## 2023-03-06 RX ADMIN — TIOTROPIUM BROMIDE 2 PUFF(S): 18 CAPSULE ORAL; RESPIRATORY (INHALATION) at 10:01

## 2023-03-06 RX ADMIN — MEXILETINE HYDROCHLORIDE 200 MILLIGRAM(S): 150 CAPSULE ORAL at 22:14

## 2023-03-06 RX ADMIN — Medication 1 TABLET(S): at 09:15

## 2023-03-06 RX ADMIN — POLYETHYLENE GLYCOL 3350 17 GRAM(S): 17 POWDER, FOR SOLUTION ORAL at 22:17

## 2023-03-06 RX ADMIN — MAGNESIUM OXIDE 400 MG ORAL TABLET 400 MILLIGRAM(S): 241.3 TABLET ORAL at 07:03

## 2023-03-06 RX ADMIN — ALBUTEROL 1.25 MILLIGRAM(S): 90 AEROSOL, METERED ORAL at 20:24

## 2023-03-06 RX ADMIN — Medication 250 MILLIGRAM(S): at 11:52

## 2023-03-06 RX ADMIN — Medication 4 MILLILITER(S): at 09:58

## 2023-03-06 RX ADMIN — POLYETHYLENE GLYCOL 3350 17 GRAM(S): 17 POWDER, FOR SOLUTION ORAL at 09:11

## 2023-03-06 RX ADMIN — MAGNESIUM OXIDE 400 MG ORAL TABLET 400 MILLIGRAM(S): 241.3 TABLET ORAL at 22:16

## 2023-03-06 RX ADMIN — Medication 1: at 08:11

## 2023-03-06 RX ADMIN — ALBUTEROL 1.25 MILLIGRAM(S): 90 AEROSOL, METERED ORAL at 23:01

## 2023-03-06 RX ADMIN — TAMSULOSIN HYDROCHLORIDE 0.4 MILLIGRAM(S): 0.4 CAPSULE ORAL at 22:14

## 2023-03-06 RX ADMIN — PANTOPRAZOLE SODIUM 40 MILLIGRAM(S): 20 TABLET, DELAYED RELEASE ORAL at 07:03

## 2023-03-06 RX ADMIN — APIXABAN 5 MILLIGRAM(S): 2.5 TABLET, FILM COATED ORAL at 07:04

## 2023-03-06 RX ADMIN — Medication 3 UNIT(S): at 11:53

## 2023-03-06 RX ADMIN — APIXABAN 5 MILLIGRAM(S): 2.5 TABLET, FILM COATED ORAL at 17:10

## 2023-03-06 RX ADMIN — Medication 125 MILLIGRAM(S): at 22:16

## 2023-03-06 RX ADMIN — INSULIN GLARGINE 34 UNIT(S): 100 INJECTION, SOLUTION SUBCUTANEOUS at 22:15

## 2023-03-06 RX ADMIN — ALBUTEROL 1.25 MILLIGRAM(S): 90 AEROSOL, METERED ORAL at 09:57

## 2023-03-06 RX ADMIN — Medication 4 MILLILITER(S): at 15:06

## 2023-03-06 RX ADMIN — MEXILETINE HYDROCHLORIDE 200 MILLIGRAM(S): 150 CAPSULE ORAL at 13:45

## 2023-03-06 RX ADMIN — Medication 3 UNIT(S): at 08:11

## 2023-03-06 RX ADMIN — Medication 0.5 MILLIGRAM(S): at 20:24

## 2023-03-06 RX ADMIN — Medication 8 MILLIGRAM(S): at 09:14

## 2023-03-06 RX ADMIN — MEXILETINE HYDROCHLORIDE 200 MILLIGRAM(S): 150 CAPSULE ORAL at 07:03

## 2023-03-06 RX ADMIN — Medication 3 UNIT(S): at 17:10

## 2023-03-06 NOTE — PROGRESS NOTE ADULT - SUBJECTIVE AND OBJECTIVE BOX
RAYRAY RODRIGUEZ  MRN: [id number]    S: Chief Complaint: fever    HPI (per admission H & P):  72 year old female with history of DM, CAD, Afib, Asthma on chronic steroids, colon cancer sp resection and recent aortic dissection repair 9/22 presents with worsening sob and cough. Patient reports she was being treated for pneumonia at [facility] with Invanz for ESBL proteus however she remained febrile for the past week being on this antibiotic. She was sent into the ED for further evaluation.    (04 Mar 2023 16:07)    3/5/2023: Patient resting comfortably. Intermittent cough especially when taking a deep breath. Denies hemoptysis. Scant sputum production. Patient reports she was having chest discomfort at [facility] but currently denies any pain.     3/6/2023: Persistent cough. Afebrilr. Sputum culture result noted. Blood c & s negative.       Patient is followed as an outpatient by Dr. Allison (pulmonologist in [place]).       PAST MEDICAL & SURGICAL HISTORY:  Atrial fibrillation  paroxysmal, on eliquis      Diabetes  Type 2      COPD (chronic obstructive pulmonary disease)      Adrenal insufficiency  Medrol daily for over 50 years      Aortic insufficiency  moderate AR on echo 5/3/2018      Pelvic fracture      Asthma      Tracheobronchomalacia  diagnosed 2015, s/p bronchial thermoplasty 2016 (Dr Zapien); recent bronchoscopy 6/5/2018 revealed no evidence of tracheobronchomalacia in trachea or bronchial tubes      Colorectal cancer  4/2018- last treatment , chemo and radiation      Rectal bleeding      Seizure  x 1 1/7/18      DVT (deep venous thrombosis)  15-20 years ago, took coumadin      TIA (transient ischemic attack)  multiple, last 5 years ago - presents as right-sided weakness      History of partial hysterectomy  30 years ago - fibroids      H/O total knee replacement, bilateral  5 years ago      History of sinus surgery  multiple sinus surgeries      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      H/O pelvic surgery  5 years ago - s/p fracture      History of tracheomalacia  2015 - attempted tracheal stenting ([facility])- course complicated by obstruction, respiratory failure, multiple CPR attempts -  stent discontinued; 10/20/2016 Tracheobronchoplasty (Prolene Mesh) performed at [facility] by Dr Zapien      S/P bronchoscopy  6/5/2018 - [facility] (Dr Zapien) no evidence of tracheobronchomalacia in trachea or bronchial tubes      Rectal bleeding  exam under anesthesia ([facility]) 2/2018          O: T(C): 36.5 (03-06-23 @ 08:21), Max: 36.9 (03-05-23 @ 23:44)  HR: 68 (03-06-23 @ 08:21) (68 - 85)  BP: 127/73 (03-06-23 @ 08:21) (118/64 - 136/83)  RR: 17 (03-06-23 @ 08:21) (16 - 18)  SpO2: 100% (03-06-23 @ 08:21) (94% - 100%)  Wt(kg): --    PHYSICAL EXAM:      Constitutional: NAD, awake and alert, well-developed  Neck: Soft and supple, No LAD, No JVD  Respiratory: Breath sounds are equal bilaterally, bilateral rhonchi  Cardiovascular: S1 and S2, irregular rate and rhythm  Gastrointestinal: Bowel Sounds present, soft, nontender, nondistended, no guarding, no rebound  Extremities: No peripheral edema  Neurological: A/O x 3        LABS:    Culture - Sputum . (03.05.23 @ 14:20)   Gram Stain:   Few Squamous epithelial cells per low power field   Few polymorphonuclear leukocytes per low power field   No organisms seen per oil power field   Specimen Source: .Sputum Sputum                         11.0   6.86  )-----------( 220      ( 06 Mar 2023 06:46 )             36.6       03-06    138  |  106  |  13  ----------------------------<  225<H>  3.9   |  29  |  0.60    Ca    9.2      06 Mar 2023 06:46  Mg     2.0     03-06    TPro  6.7  /  Alb  2.7<L>  /  TBili  0.2  /  DBili  x   /  AST  16  /  ALT  11<L>  /  AlkPhos  106  03-05    RADIOLOGY:      EXAM:  CT CHEST   ORDERED BY: SHERRY MARX   PROCEDURE DATE:  03/04/2023        INTERPRETATION:  INDICATION: Abnormal chest CT  TECHNIQUE: Unenhanced CT of the chest. Coronal, sagittal, and MIP images   were reconstructed and reviewed.  COMPARISON: 12/14/2022 chest CT.    FINDINGS:    AIRWAYS, LUNGS and PLEURA: Patent central airways. Mild paraseptal   emphysema. In comparison with 12/14/2022, interval decrease of bilateral   mucoid impactions, tree-in-bud opacities and consolidative opacity within   left lower lobe. No new opacity. No pleural effusion.    LYMPH NODES, MEDIASTINUM AND MARANDA: No lymphadenopathy.    HEART AND VESSELS: Heart size is normal. No pericardial effusion. Status   post aortic valve and ascending aorta replacement. Mild coronary   calcifications.    VISUALIZED UPPER ABDOMEN: Within normal limits.    CHEST WALL AND BONES: No aggressive osseous lesion. Intact sternotomy.    LOWER NECK: Within normal limits.    IMPRESSION:    In comparison with 12/14/2022, interval decrease of bilateral mucoid   impactions, tree-in-bud opacities and consolidative opacity within left   lower lobe. No new opacity.      NABILA BROWN MD; Attending Radiologist        MEDICATIONS  (STANDING):  acetylcysteine 10%  Inhalation 4 milliLiter(s) Inhalation every 8 hours  albuterol    0.083% 1.25 milliGRAM(s) Nebulizer every 6 hours  apixaban 5 milliGRAM(s) Oral two times a day  atorvastatin 20 milliGRAM(s) Oral at bedtime  buDESOnide    Inhalation Suspension 0.5 milliGRAM(s) Inhalation two times a day  collagenase Ointment 1 Application(s) Topical daily  dextrose 5%. 1000 milliLiter(s) (50 mL/Hr) IV Continuous <Continuous>  dextrose 5%. 1000 milliLiter(s) (100 mL/Hr) IV Continuous <Continuous>  dextrose 50% Injectable 25 Gram(s) IV Push once  dextrose 50% Injectable 12.5 Gram(s) IV Push once  dextrose 50% Injectable 25 Gram(s) IV Push once  glucagon  Injectable 1 milliGRAM(s) IntraMuscular once  insulin glargine Injectable (LANTUS) 30 Unit(s) SubCutaneous at bedtime  insulin lispro (ADMELOG) corrective regimen sliding scale   SubCutaneous three times a day before meals  insulin lispro Injectable (ADMELOG) 3 Unit(s) SubCutaneous three times a day before meals  magnesium oxide 400 milliGRAM(s) Oral every 8 hours  meropenem  IVPB 1000 milliGRAM(s) IV Intermittent every 8 hours  methylPREDNISolone 8 milliGRAM(s) Oral daily  mexiletine 200 milliGRAM(s) Oral every 8 hours  pantoprazole    Tablet 40 milliGRAM(s) Oral before breakfast  polyethylene glycol 3350 17 Gram(s) Oral two times a day  tamsulosin 0.4 milliGRAM(s) Oral at bedtime  tiotropium 2.5 MICROgram(s) Inhaler 2 Puff(s) Inhalation daily  trimethoprim   80 mG/sulfamethoxazole 400 mG 1 Tablet(s) Oral daily  vancomycin  IVPB 750 milliGRAM(s) IV Intermittent every 12 hours    MEDICATIONS  (PRN):  acetaminophen     Tablet .. 650 milliGRAM(s) Oral every 6 hours PRN Temp greater or equal to 38C (100.4F), Moderate Pain (4 - 6)  aluminum hydroxide/magnesium hydroxide/simethicone Suspension 30 milliLiter(s) Oral every 4 hours PRN Dyspepsia  dextrose Oral Gel 15 Gram(s) Oral once PRN Blood Glucose LESS THAN 70 milliGRAM(s)/deciliter  melatonin 3 milliGRAM(s) Oral at bedtime PRN Insomnia  ondansetron   Disintegrating Tablet 4 milliGRAM(s) Oral every 6 hours PRN Nausea and/or Vomiting  ondansetron Injectable 4 milliGRAM(s) IV Push every 8 hours PRN Nausea and/or Vomiting  traMADol 50 milliGRAM(s) Oral every 8 hours PRN Severe Pain (7 - 10)        A/P: Assessment/Plan: 1. Fever - now afebrile; normal WBC. Patient was treated with Invanz at the rehab facility for positive sputum culture (Proteus). Patient transferred to  presumably for persistent fever. Chest CT scan does not show findings suggestive of pneumonia. D/W  (ID).Continue antibiotics per ID.      2. Severe, chronic persistent asthma. Continue current treatment. Patient is chronically on steroids; methylprednisolone 8 mg daily. Continue inhaled bronchodilators/ICS.     3. Hx of tracheomalacia - S/P tracheobronchoplasty. Continue mucolytics (acetylcysteine).    4. Atrial fib. On Eliquis.     5. Hx of Aortic Insufficiency/ASHD. On Eliquis. No signs of decompensated cardiac disease. Continue meds - per hospitalist team.     6. DM and other comorbidities - per hospitalist team.

## 2023-03-06 NOTE — DIETITIAN INITIAL EVALUATION ADULT - PERTINENT MEDS FT
MEDICATIONS  (STANDING):  acetylcysteine 10%  Inhalation 4 milliLiter(s) Inhalation every 8 hours  albuterol    0.083% 1.25 milliGRAM(s) Nebulizer every 6 hours  apixaban 5 milliGRAM(s) Oral two times a day  atorvastatin 20 milliGRAM(s) Oral at bedtime  buDESOnide    Inhalation Suspension 0.5 milliGRAM(s) Inhalation two times a day  collagenase Ointment 1 Application(s) Topical daily  dextrose 5%. 1000 milliLiter(s) (50 mL/Hr) IV Continuous <Continuous>  dextrose 5%. 1000 milliLiter(s) (100 mL/Hr) IV Continuous <Continuous>  dextrose 50% Injectable 25 Gram(s) IV Push once  dextrose 50% Injectable 12.5 Gram(s) IV Push once  dextrose 50% Injectable 25 Gram(s) IV Push once  glucagon  Injectable 1 milliGRAM(s) IntraMuscular once  insulin glargine Injectable (LANTUS) 30 Unit(s) SubCutaneous at bedtime  insulin lispro (ADMELOG) corrective regimen sliding scale   SubCutaneous three times a day before meals  insulin lispro Injectable (ADMELOG) 3 Unit(s) SubCutaneous three times a day before meals  magnesium oxide 400 milliGRAM(s) Oral every 8 hours  meropenem  IVPB 1000 milliGRAM(s) IV Intermittent every 8 hours  methylPREDNISolone 8 milliGRAM(s) Oral daily  mexiletine 200 milliGRAM(s) Oral every 8 hours  pantoprazole    Tablet 40 milliGRAM(s) Oral before breakfast  polyethylene glycol 3350 17 Gram(s) Oral two times a day  tamsulosin 0.4 milliGRAM(s) Oral at bedtime  tiotropium 2.5 MICROgram(s) Inhaler 2 Puff(s) Inhalation daily  trimethoprim   80 mG/sulfamethoxazole 400 mG 1 Tablet(s) Oral daily  vancomycin  IVPB 750 milliGRAM(s) IV Intermittent every 12 hours    MEDICATIONS  (PRN):  acetaminophen     Tablet .. 650 milliGRAM(s) Oral every 6 hours PRN Temp greater or equal to 38C (100.4F), Moderate Pain (4 - 6)  aluminum hydroxide/magnesium hydroxide/simethicone Suspension 30 milliLiter(s) Oral every 4 hours PRN Dyspepsia  dextrose Oral Gel 15 Gram(s) Oral once PRN Blood Glucose LESS THAN 70 milliGRAM(s)/deciliter  melatonin 3 milliGRAM(s) Oral at bedtime PRN Insomnia  ondansetron   Disintegrating Tablet 4 milliGRAM(s) Oral every 6 hours PRN Nausea and/or Vomiting  ondansetron Injectable 4 milliGRAM(s) IV Push every 8 hours PRN Nausea and/or Vomiting  traMADol 50 milliGRAM(s) Oral every 8 hours PRN Severe Pain (7 - 10)

## 2023-03-06 NOTE — DIETITIAN INITIAL EVALUATION ADULT - NSICDXPASTSURGICALHX_GEN_ALL_CORE_FT
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)- course complicated by obstruction, respiratory failure, multiple CPR attempts -  stent discontinued; 10/20/2016 Tracheobronchoplasty (Prolene Mesh) performed at [facility] by Dr Zapien    Rectal bleeding exam under anesthesia ([facility]) 2/2018    S/P bronchoscopy 6/5/2018 - [facility] (Dr Zapien) no evidence of tracheobronchomalacia in trachea or bronchial tubes

## 2023-03-06 NOTE — PROGRESS NOTE ADULT - ASSESSMENT
71 y/o female with h/o DM type 2, CAD, A.fib, asthma on chronic steroids, colon cancer s/p resection, aortic dissection repair 9/22 was admitted on 3/4 for worsening sob and cough. Patient reports she was being treated for pneumonia at [facility] with Invanz for ESBL proteus however she remained febrile for the past week despite being on this antibiotic. She was sent into the ED for further evaluation. In ER she was noted with low grade fever to 100.1F and received meropenem, vancomycin IV, fluconazole IV and bactrim PO.    1. Low grade fever. Possible sepsis. Right base pneumonia. Possible aspiration pneumonia. Allergy to PCN, cefepime (anaphylaxis).  -mucoid impaction  -reported febrile despite IV abx therapy with ertapenem ?cause ?MDRO ?drug fever  -BC x 2, sputum c/s noted  -abx choice is limited to multiple abx allergies  -on meropenem 1 gm IV q8h and vancomycin 750 mg IV q12h # 2  -tolerating abx well so far; no side effects noted  -obtain vancomycin trough level  -monitor closely in donnie of PCN allergy history  -pulmonary evaluation appreciated  -respiratory care  -continue abx coverage   -f/u cultures  -monitor temps  -f/u CBC  -supportive care  2. Other issues:   -care per medicine

## 2023-03-06 NOTE — DIETITIAN INITIAL EVALUATION ADULT - PERTINENT LABORATORY DATA
03-06    138  |  106  |  13  ----------------------------<  225<H>  3.9   |  29  |  0.60    Ca    9.2      06 Mar 2023 06:46  Mg     2.0     03-06    TPro  6.7  /  Alb  2.7<L>  /  TBili  0.2  /  DBili  x   /  AST  16  /  ALT  11<L>  /  AlkPhos  106  03-05  POCT Blood Glucose.: 164 mg/dL (03-06-23 @ 11:51)  A1C with Estimated Average Glucose Result: 10.3 % (03-05-23 @ 06:35)  A1C with Estimated Average Glucose Result: 8.6 % (12-15-22 @ 06:19)  A1C with Estimated Average Glucose Result: 8.3 % (12-14-22 @ 11:19)    Iron Total, Serum: 26 ug/dL (12-02-21 @ 10:51)

## 2023-03-06 NOTE — PROGRESS NOTE ADULT - ASSESSMENT
72 year old female, retired ,  with history of DM, CAD, Afib, Asthma on chronic steroids, colon cancer sp resection and recent aortic dissection repair 9/22 presents with worsening sob and cough.     # ESBL Pneumonia with Proteus on Invanz at [facility]  Patient sent to  for low-grade fever   H/o Severe Chronic persistent asthma  H/O Tracheomalacia s/p tracheobronchoplasty   - seen by ID and Pulm  current on IV meropenem for suspected persistent pneumonia   also on vancomycin IV, f/u Vanco trough, monitor serum Cr  FU repeat sputum culture  - Sputum for legionella and strep  - FU blood cultures  - cont chronic steroids and inhalers  and acetylcysteine       # Afib  - Continue eliquis  - Continue mexiletine  - Monitor on remote tele today - can likely dc tele tmr     # DM  - Continue Basal bolus  - note elevated A1C, FS high - will increase lantus from 25 to 30 qhs     # CAD  - Stable  - Continue atorvastatin    # Bladder outlet obstruction  - Continue flomax    # History of MRSA and Candidaemia   - Continue fluconazole and bactrim for now     # GERD  - Continue protonix    # DVT/CVA  prophylaxis  - Eliquis      DISPO - cont IV abx, follow labs incl vanco trough and serum Cr    72 year old female, retired ,  with history of DM, CAD, Afib, Asthma on chronic steroids, colon cancer sp resection and recent aortic dissection repair 9/22 presents with worsening sob and cough.     # ESBL Pneumonia with Proteus on Invanz at [facility]  Patient sent to  for low-grade fever   H/o Severe Chronic persistent asthma  H/O Tracheomalacia s/p tracheobronchoplasty   - seen by ID and Pulm  current on IV meropenem for suspected persistent pneumonia   also on vancomycin IV, f/u Vanco trough, monitor serum Cr  FU repeat sputum culture  - Sputum for legionella and strep  - FU blood cultures  - cont chronic steroids and inhalers  and acetylcysteine       # Afib  - Continue eliquis  - Continue mexiletine  - Monitor on remote tele today - can likely dc tele tmr     # DM  - Continue Basal bolus  - note elevated A1C, FS high - will increase lantus from 25 to 30 qhs   - 3/6 - increase lantus further to 34U given hyperglycemia     # CAD  - Stable  - Continue atorvastatin    # Bladder outlet obstruction  - Continue flomax    # History of MRSA and Candidaemia   - Continue fluconazole and bactrim for now     # GERD  - Continue protonix    # DVT/CVA  prophylaxis  - Eliquis      DISPO - cont IV abx, follow labs incl vanco trough and serum Cr, monitor FS

## 2023-03-06 NOTE — DIETITIAN NUTRITION RISK NOTIFICATION - ADDITIONAL COMMENTS/DIETITIAN RECOMMENDATIONS
1. C/w current PO diet (consistent CHO diet) w/ regular texture as per SLP (consult appreciated)  2. Provide Glucerna TID (provides 220 kcal, 10 g protein/ shake) to optimize nutritional needs and promote wound healing  3. Encourage protein-rich foods, maximize food preferences. Provide meal encouragement and provide assistance w/ meals prn.   4. Closely monitor bowel movements; maintain adequate hydration and if no BM for >3 days, consider implementing bowel regimen.   5. Recommend to add MVI w/minerals, Vit C 500 mg BID, add Zinc Sulfate 220 mg x 10 days to promote wound healing.   6. Consider adding thiamine 100 mg daily 2/2 poor PO intake/ malnutrition   7. Maintain aspiration precautions, back of bed >45 degrees.   8. Confirm goals of care regarding nutrition support   RD will continue to monitor PO intake, labs, hydration, and wt prn.

## 2023-03-06 NOTE — DIETITIAN INITIAL EVALUATION ADULT - OTHER INFO
72 year old female with history of DM, CAD, Afib, Asthma on chronic steroids, colon cancer sp resection and recent aortic dissection repair 9/22 presents with worsening sob and cough.    Pt well-known to RD and nutr services. Upon RD visit, consumed 100% breakfast tray (avocado toast, breakfast sausage, 1 egg, bowl of mixed fruit). Currently on consistent CHO diet w/ regular texture (SLP consult appreciated). POCTs x 24 hrs: 275, 263, 390, 264, 198, A1c 10.3%; received 13 units Admelog sliding scale x 24 hrs. Reports UBW at 140# however endorses recent wt loss, stating recent wt obtained at 129# x 2 weeks ago. 6 month wt hx as per chart: 134# on 1/6; 122# on 12/15; 137# on 11/26; 152# on 11/14 (possible fluid retention); 154# on 10/17; 144# on 9/24; 148# on 9/7 (possible fluid retention); wt fluctuates 2/2 fluid retention. Bed scale wt of 140# taken by RD on 3/6, however ? accuracy of wt. Admit wt obtained at 130# (3/4), 4# wt loss/ 2.98% wt change x 2 months - not clinically significant. Has hx colon CA (2018) w/ colostomy bag, reports of no issues. Appeared very thin, frail, and bony upon assessment. NFPE reveals severe muscle/fat wasting. Will trial Glucerna TID (provides 220 kcal, 10 g protein/ shake) to optimize nutritional needs and promote wound healing - pt receptive. Also encourage high-protein foods to help w/ wound healing. See recommendations below.

## 2023-03-06 NOTE — DIETITIAN INITIAL EVALUATION ADULT - ORAL INTAKE PTA/DIET HISTORY
From [facility] - on no concentrated CHO, mechanical soft diet and receives Glucerna TID. Likely meeting <50% ENN.

## 2023-03-06 NOTE — PROGRESS NOTE ADULT - SUBJECTIVE AND OBJECTIVE BOX
CC: 72 year old female with history of DM, CAD, Afib, Asthma on chronic steroids, colon cancer sp resection and recent aortic dissection repair 9/22 presents with worsening sob and cough. Patient reports she was being treated for pneumonia at [facility] with Invanz for ESBL proteus however she remained febrile for the past week being on this antibiotic. She was sent into the ED for further evaluation.     3/5 - some cough, occ green to brown sputum; reports that she has chr asthma and is always on steroids; retired    3/6 - no cp palps sob abdo pain, cough is improving, tolerating po diet     PHYSICAL EXAMN:  Vital Signs Last 24 Hrs  T(F): 97.7 (06 Mar 2023 08:21), Max: 98.4 (05 Mar 2023 23:44)  HR: 89 (06 Mar 2023 09:50) (68 - 90)  BP: 127/73 (06 Mar 2023 08:21) (118/64 - 127/73)  RR: 17 (06 Mar 2023 08:21) (16 - 17)  SpO2: 94% (06 Mar 2023 09:50) (94% - 100%) nasal cannula,2   Constitutional: NAD, awake and alert  HEENT: PERR, EOMI  Neck: Soft and supple,  No JVD  Respiratory: Breath sounds are clear bilaterally, No wheezing, rales or rhonchi  Cardiovascular: S1 and S2, regular rate and rhythm, no Murmurs  Gastrointestinal: Bowel Sounds present, soft, nontender, nondistended  Extremities: No peripheral edema  Vascular: 2+ peripheral pulses  Neurological: A/O x 3, no focal deficits  Musculoskeletal: 5/5 strength b/l upper and lower extremities  Skin: No rashes    RADIOLOGY/EKG:  < from: CT Chest No Cont (03.04.23 @ 13:53) >  In comparison with 12/14/2022, interval decrease of bilateral mucoid   impactions, tree-in-bud opacities and consolidative opacity within left   lower lobe. No new opacity.    LABS: All Labs Reviewed:                     11.0   6.86  )-----------( 220      ( 06 Mar 2023 06:46 )             36.6   138  |  106  |  13  ----------------------------<  225<H>  3.9   |  29  |  0.60  Ca    9.2      06 Mar 2023 06:46  Mg     2.0     03-06  TPro  6.7  /  Alb  2.7<L>  /  TBili  0.2  /  DBili  x   /  AST  16  /  ALT  11<L>  /  AlkPhos  106  03-05        MEDS:   acetaminophen     Tablet .. 650 milliGRAM(s) Oral every 6 hours PRN  acetylcysteine 10%  Inhalation 4 milliLiter(s) Inhalation every 8 hours  albuterol    0.083% 1.25 milliGRAM(s) Nebulizer every 6 hours  aluminum hydroxide/magnesium hydroxide/simethicone Suspension 30 milliLiter(s) Oral every 4 hours PRN  apixaban 5 milliGRAM(s) Oral two times a day  atorvastatin 20 milliGRAM(s) Oral at bedtime  buDESOnide    Inhalation Suspension 0.5 milliGRAM(s) Inhalation two times a day  collagenase Ointment 1 Application(s) Topical daily  insulin glargine Injectable (LANTUS) 30 Unit(s) SubCutaneous at bedtime  insulin lispro (ADMELOG) corrective regimen sliding scale   SubCutaneous three times a day before meals  insulin lispro Injectable (ADMELOG) 3 Unit(s) SubCutaneous three times a day before meals  magnesium oxide 400 milliGRAM(s) Oral every 8 hours  melatonin 3 milliGRAM(s) Oral at bedtime PRN  meropenem  IVPB 1000 milliGRAM(s) IV Intermittent every 8 hours  methylPREDNISolone 8 milliGRAM(s) Oral daily  mexiletine 200 milliGRAM(s) Oral every 8 hours  ondansetron   Disintegrating Tablet 4 milliGRAM(s) Oral every 6 hours PRN  ondansetron Injectable 4 milliGRAM(s) IV Push every 8 hours PRN  pantoprazole    Tablet 40 milliGRAM(s) Oral before breakfast  polyethylene glycol 3350 17 Gram(s) Oral two times a day  tamsulosin 0.4 milliGRAM(s) Oral at bedtime  tiotropium 2.5 MICROgram(s) Inhaler 2 Puff(s) Inhalation daily  traMADol 50 milliGRAM(s) Oral every 8 hours PRN  trimethoprim   80 mG/sulfamethoxazole 400 mG 1 Tablet(s) Oral daily  vancomycin  IVPB 750 milliGRAM(s) IV Intermittent every 12 hours         CC: 72 year old female with history of DM, CAD, Afib, Asthma on chronic steroids, colon cancer sp resection and recent aortic dissection repair 9/22 presents with worsening sob and cough. Patient reports she was being treated for pneumonia at [facility] with Invanz for ESBL proteus however she remained febrile for the past week being on this antibiotic. She was sent into the ED for further evaluation.     3/5 - some cough, occ green to brown sputum; reports that she has chr asthma and is always on steroids; retired    3/6 - no cp palps sob abdo pain, cough is improving, tolerating po diet; on steroids and hyperglycemia noted will incr sharontus qhs     PHYSICAL EXAMN:  Vital Signs Last 24 Hrs  T(F): 97.7 (06 Mar 2023 08:21), Max: 98.4 (05 Mar 2023 23:44)  HR: 89 (06 Mar 2023 09:50) (68 - 90)  BP: 127/73 (06 Mar 2023 08:21) (118/64 - 127/73)  RR: 17 (06 Mar 2023 08:21) (16 - 17)  SpO2: 94% (06 Mar 2023 09:50) (94% - 100%) nasal cannula,2   Constitutional: NAD, awake and alert  HEENT: PERR, EOMI  Neck: Soft and supple,  No JVD  Respiratory: Breath sounds are clear bilaterally, No wheezing, rales or rhonchi  Cardiovascular: S1 and S2, regular rate and rhythm, no Murmurs  Gastrointestinal: Bowel Sounds present, soft, nontender, nondistended  Extremities: No peripheral edema  Vascular: 2+ peripheral pulses  Neurological: A/O x 3, no focal deficits  Musculoskeletal: 5/5 strength b/l upper and lower extremities  Skin: No rashes    RADIOLOGY/EKG:  < from: CT Chest No Cont (03.04.23 @ 13:53) >  In comparison with 12/14/2022, interval decrease of bilateral mucoid   impactions, tree-in-bud opacities and consolidative opacity within left   lower lobe. No new opacity.    LABS: All Labs Reviewed:                     11.0   6.86  )-----------( 220      ( 06 Mar 2023 06:46 )             36.6   138  |  106  |  13  ----------------------------<  225<H>  3.9   |  29  |  0.60  Ca    9.2      06 Mar 2023 06:46  Mg     2.0     03-06  TPro  6.7  /  Alb  2.7<L>  /  TBili  0.2  /  DBili  x   /  AST  16  /  ALT  11<L>  /  AlkPhos  106  03-05        MEDS:   acetaminophen     Tablet .. 650 milliGRAM(s) Oral every 6 hours PRN  acetylcysteine 10%  Inhalation 4 milliLiter(s) Inhalation every 8 hours  albuterol    0.083% 1.25 milliGRAM(s) Nebulizer every 6 hours  aluminum hydroxide/magnesium hydroxide/simethicone Suspension 30 milliLiter(s) Oral every 4 hours PRN  apixaban 5 milliGRAM(s) Oral two times a day  atorvastatin 20 milliGRAM(s) Oral at bedtime  buDESOnide    Inhalation Suspension 0.5 milliGRAM(s) Inhalation two times a day  collagenase Ointment 1 Application(s) Topical daily  insulin glargine Injectable (LANTUS) 30 Unit(s) SubCutaneous at bedtime  insulin lispro (ADMELOG) corrective regimen sliding scale   SubCutaneous three times a day before meals  insulin lispro Injectable (ADMELOG) 3 Unit(s) SubCutaneous three times a day before meals  magnesium oxide 400 milliGRAM(s) Oral every 8 hours  melatonin 3 milliGRAM(s) Oral at bedtime PRN  meropenem  IVPB 1000 milliGRAM(s) IV Intermittent every 8 hours  methylPREDNISolone 8 milliGRAM(s) Oral daily  mexiletine 200 milliGRAM(s) Oral every 8 hours  ondansetron   Disintegrating Tablet 4 milliGRAM(s) Oral every 6 hours PRN  ondansetron Injectable 4 milliGRAM(s) IV Push every 8 hours PRN  pantoprazole    Tablet 40 milliGRAM(s) Oral before breakfast  polyethylene glycol 3350 17 Gram(s) Oral two times a day  tamsulosin 0.4 milliGRAM(s) Oral at bedtime  tiotropium 2.5 MICROgram(s) Inhaler 2 Puff(s) Inhalation daily  traMADol 50 milliGRAM(s) Oral every 8 hours PRN  trimethoprim   80 mG/sulfamethoxazole 400 mG 1 Tablet(s) Oral daily  vancomycin  IVPB 750 milliGRAM(s) IV Intermittent every 12 hours

## 2023-03-06 NOTE — PROVIDER CONTACT NOTE (OTHER) - BACKGROUND
Pt had CT Cx 3/6/23 + for lung abscess, Cardiothoracic Consulted, PA spoke with pt and wife regarding possible treatment plans

## 2023-03-06 NOTE — PROGRESS NOTE ADULT - SUBJECTIVE AND OBJECTIVE BOX
Date of service: 03-06-23 @ 12:36    Has cough with scant sputum  Fever is down    ROS: no fever or chills; denies dizziness, no HA, no abdominal pain, no diarrhea or constipation; no dysuria, no legs pain, no rashes    MEDICATIONS  (STANDING):  acetylcysteine 10%  Inhalation 4 milliLiter(s) Inhalation every 8 hours  albuterol    0.083% 1.25 milliGRAM(s) Nebulizer every 6 hours  apixaban 5 milliGRAM(s) Oral two times a day  atorvastatin 20 milliGRAM(s) Oral at bedtime  buDESOnide    Inhalation Suspension 0.5 milliGRAM(s) Inhalation two times a day  collagenase Ointment 1 Application(s) Topical daily  dextrose 5%. 1000 milliLiter(s) (50 mL/Hr) IV Continuous <Continuous>  dextrose 5%. 1000 milliLiter(s) (100 mL/Hr) IV Continuous <Continuous>  dextrose 50% Injectable 25 Gram(s) IV Push once  dextrose 50% Injectable 12.5 Gram(s) IV Push once  dextrose 50% Injectable 25 Gram(s) IV Push once  glucagon  Injectable 1 milliGRAM(s) IntraMuscular once  insulin glargine Injectable (LANTUS) 30 Unit(s) SubCutaneous at bedtime  insulin lispro (ADMELOG) corrective regimen sliding scale   SubCutaneous three times a day before meals  insulin lispro Injectable (ADMELOG) 3 Unit(s) SubCutaneous three times a day before meals  magnesium oxide 400 milliGRAM(s) Oral every 8 hours  meropenem  IVPB 1000 milliGRAM(s) IV Intermittent every 8 hours  methylPREDNISolone 8 milliGRAM(s) Oral daily  mexiletine 200 milliGRAM(s) Oral every 8 hours  pantoprazole    Tablet 40 milliGRAM(s) Oral before breakfast  polyethylene glycol 3350 17 Gram(s) Oral two times a day  tamsulosin 0.4 milliGRAM(s) Oral at bedtime  tiotropium 2.5 MICROgram(s) Inhaler 2 Puff(s) Inhalation daily  trimethoprim   80 mG/sulfamethoxazole 400 mG 1 Tablet(s) Oral daily  vancomycin  IVPB 750 milliGRAM(s) IV Intermittent every 12 hours    Vital Signs Last 24 Hrs  T(C): 36.5 (06 Mar 2023 08:21), Max: 36.9 (05 Mar 2023 23:44)  T(F): 97.7 (06 Mar 2023 08:21), Max: 98.4 (05 Mar 2023 23:44)  HR: 68 (06 Mar 2023 08:21) (68 - 85)  BP: 127/73 (06 Mar 2023 08:21) (118/64 - 136/83)  BP(mean): --  RR: 17 (06 Mar 2023 08:21) (16 - 18)  SpO2: 100% (06 Mar 2023 08:21) (94% - 100%)    Parameters below as of 06 Mar 2023 08:21  Patient On (Oxygen Delivery Method): nasal cannula     Physical exam:    Constitutional:  No acute distress  HEENT: NC/AT, EOMI, PERRLA, conjunctivae clear; ears and nose atraumatic  Neck: supple; thyroid not palpable  Back: no tenderness  Respiratory: respiratory effort normal; crackles at bases  Cardiovascular: S1S2 regular, no murmurs  Abdomen: soft, not tender, not distended, positive BS  Genitourinary: no suprapubic tenderness  Lymphatic: no LN palpable  Musculoskeletal: no muscle tenderness, no joint swelling or tenderness  Extremities: no pedal edema  Neurological/ Psychiatric: AxOx3, moving all extremities  Skin: no rashes; no palpable lesions    Labs: all available labs reviewed                        10.8   6.27  )-----------( 239      ( 05 Mar 2023 06:35 )             36.7     03-05    135  |  106  |  15  ----------------------------<  328<H>  4.4   |  25  |  0.68    Ca    9.1      05 Mar 2023 06:35  Mg     2.0     03-04    TPro  6.7  /  Alb  2.7<L>  /  TBili  0.2  /  DBili  x   /  AST  16  /  ALT  11<L>  /  AlkPhos  106  03-05     LIVER FUNCTIONS - ( 05 Mar 2023 06:35 )  Alb: 2.7 g/dL / Pro: 6.7 gm/dL / ALK PHOS: 106 U/L / ALT: 11 U/L / AST: 16 U/L / GGT: x             Culture - Sputum (collected 05 Mar 2023 14:20)  Source: .Sputum Sputum  Gram Stain (05 Mar 2023 20:44):    Few Squamous epithelial cells per low power field    Few polymorphonuclear leukocytes per low power field    No organisms seen per oil power field    Culture - Blood (collected 04 Mar 2023 11:47)  Source: .Blood Blood  Preliminary Report (05 Mar 2023 18:02):    No growth to date.    Radiology: all available radiological tests reviewed    < from: CT Chest No Cont (03.04.23 @ 13:53) >  In comparison with 12/14/2022, interval decrease of bilateral mucoid   impactions, tree-in-bud opacities and consolidative opacity within left   lower lobe. No new opacity.  < end of copied text >    < from: Xray Chest 1 View- PORTABLE-Urgent (03.04.23 @ 11:32) >  IMPRESSION: Patchy infiltrate at the right base for which follow-up   radiographs aresuggested.  < end of copied text >    Advanced directives addressed: full resuscitation

## 2023-03-07 LAB
CULTURE RESULTS: SIGNIFICANT CHANGE UP
GLUCOSE BLDC GLUCOMTR-MCNC: 209 MG/DL — HIGH (ref 70–99)
GLUCOSE BLDC GLUCOMTR-MCNC: 227 MG/DL — HIGH (ref 70–99)
GLUCOSE BLDC GLUCOMTR-MCNC: 272 MG/DL — HIGH (ref 70–99)
GLUCOSE BLDC GLUCOMTR-MCNC: 278 MG/DL — HIGH (ref 70–99)
SPECIMEN SOURCE: SIGNIFICANT CHANGE UP

## 2023-03-07 PROCEDURE — 99233 SBSQ HOSP IP/OBS HIGH 50: CPT

## 2023-03-07 RX ADMIN — PANTOPRAZOLE SODIUM 40 MILLIGRAM(S): 20 TABLET, DELAYED RELEASE ORAL at 06:10

## 2023-03-07 RX ADMIN — Medication 3 UNIT(S): at 08:53

## 2023-03-07 RX ADMIN — Medication 2: at 13:14

## 2023-03-07 RX ADMIN — Medication 2: at 08:53

## 2023-03-07 RX ADMIN — MAGNESIUM OXIDE 400 MG ORAL TABLET 400 MILLIGRAM(S): 241.3 TABLET ORAL at 06:10

## 2023-03-07 RX ADMIN — MEXILETINE HYDROCHLORIDE 200 MILLIGRAM(S): 150 CAPSULE ORAL at 06:10

## 2023-03-07 RX ADMIN — MEROPENEM 100 MILLIGRAM(S): 1 INJECTION INTRAVENOUS at 15:24

## 2023-03-07 RX ADMIN — MAGNESIUM OXIDE 400 MG ORAL TABLET 400 MILLIGRAM(S): 241.3 TABLET ORAL at 22:08

## 2023-03-07 RX ADMIN — APIXABAN 5 MILLIGRAM(S): 2.5 TABLET, FILM COATED ORAL at 18:01

## 2023-03-07 RX ADMIN — APIXABAN 5 MILLIGRAM(S): 2.5 TABLET, FILM COATED ORAL at 06:10

## 2023-03-07 RX ADMIN — Medication 4 MILLILITER(S): at 14:50

## 2023-03-07 RX ADMIN — Medication 1 APPLICATION(S): at 22:04

## 2023-03-07 RX ADMIN — ALBUTEROL 1.25 MILLIGRAM(S): 90 AEROSOL, METERED ORAL at 14:50

## 2023-03-07 RX ADMIN — MEROPENEM 100 MILLIGRAM(S): 1 INJECTION INTRAVENOUS at 22:05

## 2023-03-07 RX ADMIN — Medication 3 UNIT(S): at 18:00

## 2023-03-07 RX ADMIN — Medication 0.5 MILLIGRAM(S): at 20:26

## 2023-03-07 RX ADMIN — ALBUTEROL 1.25 MILLIGRAM(S): 90 AEROSOL, METERED ORAL at 23:40

## 2023-03-07 RX ADMIN — Medication 3 UNIT(S): at 13:14

## 2023-03-07 RX ADMIN — Medication 4 MILLILITER(S): at 09:03

## 2023-03-07 RX ADMIN — Medication 1 TABLET(S): at 10:39

## 2023-03-07 RX ADMIN — ALBUTEROL 1.25 MILLIGRAM(S): 90 AEROSOL, METERED ORAL at 09:02

## 2023-03-07 RX ADMIN — MAGNESIUM OXIDE 400 MG ORAL TABLET 400 MILLIGRAM(S): 241.3 TABLET ORAL at 15:24

## 2023-03-07 RX ADMIN — MEXILETINE HYDROCHLORIDE 200 MILLIGRAM(S): 150 CAPSULE ORAL at 22:06

## 2023-03-07 RX ADMIN — ATORVASTATIN CALCIUM 20 MILLIGRAM(S): 80 TABLET, FILM COATED ORAL at 22:07

## 2023-03-07 RX ADMIN — INSULIN GLARGINE 34 UNIT(S): 100 INJECTION, SOLUTION SUBCUTANEOUS at 22:06

## 2023-03-07 RX ADMIN — MEXILETINE HYDROCHLORIDE 200 MILLIGRAM(S): 150 CAPSULE ORAL at 15:25

## 2023-03-07 RX ADMIN — Medication 125 MILLIGRAM(S): at 10:40

## 2023-03-07 RX ADMIN — Medication 4 MILLILITER(S): at 23:40

## 2023-03-07 RX ADMIN — TIOTROPIUM BROMIDE 2 PUFF(S): 18 CAPSULE ORAL; RESPIRATORY (INHALATION) at 09:23

## 2023-03-07 RX ADMIN — Medication 0.5 MILLIGRAM(S): at 09:02

## 2023-03-07 RX ADMIN — Medication 8 MILLIGRAM(S): at 10:39

## 2023-03-07 RX ADMIN — POLYETHYLENE GLYCOL 3350 17 GRAM(S): 17 POWDER, FOR SOLUTION ORAL at 22:07

## 2023-03-07 RX ADMIN — Medication 125 MILLIGRAM(S): at 22:57

## 2023-03-07 RX ADMIN — TAMSULOSIN HYDROCHLORIDE 0.4 MILLIGRAM(S): 0.4 CAPSULE ORAL at 22:06

## 2023-03-07 RX ADMIN — Medication 3: at 18:00

## 2023-03-07 RX ADMIN — POLYETHYLENE GLYCOL 3350 17 GRAM(S): 17 POWDER, FOR SOLUTION ORAL at 10:39

## 2023-03-07 RX ADMIN — ALBUTEROL 1.25 MILLIGRAM(S): 90 AEROSOL, METERED ORAL at 20:24

## 2023-03-07 RX ADMIN — MEROPENEM 100 MILLIGRAM(S): 1 INJECTION INTRAVENOUS at 06:08

## 2023-03-07 NOTE — PROGRESS NOTE ADULT - SUBJECTIVE AND OBJECTIVE BOX
RAYRAY RODRIGUEZ  MRN: [id number]    S: HPI (per admission H & P):  72 year old female with history of DM, CAD, Afib, Asthma on chronic steroids, colon cancer sp resection and recent aortic dissection repair 9/22 presents with worsening sob and cough. Patient reports she was being treated for pneumonia at [facility] with Invanz for ESBL proteus however she remained febrile for the past week being on this antibiotic. She was sent into the ED for further evaluation.    (04 Mar 2023 16:07)    3/5/2023: Patient resting comfortably. Intermittent cough especially when taking a deep breath. Denies hemoptysis. Scant sputum production. Patient reports she was having chest discomfort at [facility] but currently denies any pain.     3/6/2023: Persistent cough. Afebrilr. Sputum culture result noted. Blood c & s negative.     3/7/2023: Less cough. Feels improved. Afebrile.       Patient is followed as an outpatient by Dr. Allison (pulmonologist in [place]).       PAST MEDICAL & SURGICAL HISTORY:  Atrial fibrillation  paroxysmal, on eliquis      Diabetes  Type 2      COPD (chronic obstructive pulmonary disease)      Adrenal insufficiency  Medrol daily for over 50 years      Aortic insufficiency  moderate AR on echo 5/3/2018      Pelvic fracture      Asthma      Tracheobronchomalacia  diagnosed 2015, s/p bronchial thermoplasty 2016 (Dr Zapien); recent bronchoscopy 6/5/2018 revealed no evidence of tracheobronchomalacia in trachea or bronchial tubes      Colorectal cancer  4/2018- last treatment , chemo and radiation      Rectal bleeding      Seizure  x 1 1/7/18      DVT (deep venous thrombosis)  15-20 years ago, took coumadin      TIA (transient ischemic attack)  multiple, last 5 years ago - presents as right-sided weakness      History of partial hysterectomy  30 years ago - fibroids      H/O total knee replacement, bilateral  5 years ago      History of sinus surgery  multiple sinus surgeries      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      H/O pelvic surgery  5 years ago - s/p fracture      History of tracheomalacia  2015 - attempted tracheal stenting ([facility])- course complicated by obstruction, respiratory failure, multiple CPR attempts -  stent discontinued; 10/20/2016 Tracheobronchoplasty (Prolene Mesh) performed at [facility] by Dr Zapien      S/P bronchoscopy  6/5/2018 - [place] [facility] (Dr Zapien) no evidence of tracheobronchomalacia in trachea or bronchial tubes      Rectal bleeding  exam under anesthesia ([facility]) 2/2018          O: T(C): 36.5 (03-07-23 @ 08:15), Max: 36.7 (03-06-23 @ 23:14)  HR: 72 (03-07-23 @ 08:15) (70 - 91)  BP: 125/50 (03-07-23 @ 08:15) (103/60 - 125/50)  RR: 17 (03-07-23 @ 08:15) (17 - 17)  SpO2: 100% (03-07-23 @ 08:15) (92% - 100%)  Wt(kg): --    PHYSICAL EXAM:      Constitutional: NAD, awake and alert, well-developed  Neck: Soft and supple, No LAD, No JVD  Respiratory: Breath sounds are equal bilaterally, no wheezing. Clear this AM.   Cardiovascular: S1 and S2, irregular rate and rhythm  Gastrointestinal: Bowel Sounds present, soft, nontender, nondistended, no guarding, no rebound  Extremities: No peripheral edema  Neurological: A/O x 3    LABS:    Culture - Sputum . (03.05.23 @ 14:20)   Gram Stain:   Few Squamous epithelial cells per low power field   Few polymorphonuclear leukocytes per low power field   No organisms seen per oil power field   Specimen Source: .Sputum Sputum   Culture Results:   Normal Respiratory Val present                         11.0   6.86  )-----------( 220      ( 06 Mar 2023 06:46 )             36.6       03-06    138  |  106  |  13  ----------------------------<  225<H>  3.9   |  29  |  0.60    Ca    9.2      06 Mar 2023 06:46  Mg     2.0     03-06    RADIOLOGY:      EXAM:  CT CHEST   ORDERED BY: SHERRY MARX   PROCEDURE DATE:  03/04/2023        INTERPRETATION:  INDICATION: Abnormal chest CT  TECHNIQUE: Unenhanced CT of the chest. Coronal, sagittal, and MIP images   were reconstructed and reviewed.  COMPARISON: 12/14/2022 chest CT.    FINDINGS:    AIRWAYS, LUNGS and PLEURA: Patent central airways. Mild paraseptal   emphysema. In comparison with 12/14/2022, interval decrease of bilateral   mucoid impactions, tree-in-bud opacities and consolidative opacity within   left lower lobe. No new opacity. No pleural effusion.    LYMPH NODES, MEDIASTINUM AND MARANDA: No lymphadenopathy.    HEART AND VESSELS: Heart size is normal. No pericardial effusion. Status   post aortic valve and ascending aorta replacement. Mild coronary   calcifications.    VISUALIZED UPPER ABDOMEN: Within normal limits.    CHEST WALL AND BONES: No aggressive osseous lesion. Intact sternotomy.    LOWER NECK: Within normal limits.    IMPRESSION:    In comparison with 12/14/2022, interval decrease of bilateral mucoid   impactions, tree-in-bud opacities and consolidative opacity within left   lower lobe. No new opacity.      NABILA BROWN MD; Attending Radiologist      MEDICATIONS  (STANDING):  acetylcysteine 10%  Inhalation 4 milliLiter(s) Inhalation every 8 hours  albuterol    0.083% 1.25 milliGRAM(s) Nebulizer every 6 hours  apixaban 5 milliGRAM(s) Oral two times a day  atorvastatin 20 milliGRAM(s) Oral at bedtime  buDESOnide    Inhalation Suspension 0.5 milliGRAM(s) Inhalation two times a day  collagenase Ointment 1 Application(s) Topical daily  dextrose 5%. 1000 milliLiter(s) (50 mL/Hr) IV Continuous <Continuous>  dextrose 5%. 1000 milliLiter(s) (100 mL/Hr) IV Continuous <Continuous>  dextrose 50% Injectable 25 Gram(s) IV Push once  dextrose 50% Injectable 12.5 Gram(s) IV Push once  dextrose 50% Injectable 25 Gram(s) IV Push once  glucagon  Injectable 1 milliGRAM(s) IntraMuscular once  insulin glargine Injectable (LANTUS) 34 Unit(s) SubCutaneous at bedtime  insulin lispro (ADMELOG) corrective regimen sliding scale   SubCutaneous three times a day before meals  insulin lispro Injectable (ADMELOG) 3 Unit(s) SubCutaneous three times a day before meals  magnesium oxide 400 milliGRAM(s) Oral every 8 hours  meropenem  IVPB 1000 milliGRAM(s) IV Intermittent every 8 hours  methylPREDNISolone 8 milliGRAM(s) Oral daily  mexiletine 200 milliGRAM(s) Oral every 8 hours  pantoprazole    Tablet 40 milliGRAM(s) Oral before breakfast  polyethylene glycol 3350 17 Gram(s) Oral two times a day  tamsulosin 0.4 milliGRAM(s) Oral at bedtime  tiotropium 2.5 MICROgram(s) Inhaler 2 Puff(s) Inhalation daily  trimethoprim   80 mG/sulfamethoxazole 400 mG 1 Tablet(s) Oral daily  vancomycin  IVPB 750 milliGRAM(s) IV Intermittent every 12 hours    MEDICATIONS  (PRN):  acetaminophen     Tablet .. 650 milliGRAM(s) Oral every 6 hours PRN Temp greater or equal to 38C (100.4F), Moderate Pain (4 - 6)  aluminum hydroxide/magnesium hydroxide/simethicone Suspension 30 milliLiter(s) Oral every 4 hours PRN Dyspepsia  dextrose Oral Gel 15 Gram(s) Oral once PRN Blood Glucose LESS THAN 70 milliGRAM(s)/deciliter  melatonin 3 milliGRAM(s) Oral at bedtime PRN Insomnia  ondansetron   Disintegrating Tablet 4 milliGRAM(s) Oral every 6 hours PRN Nausea and/or Vomiting  ondansetron Injectable 4 milliGRAM(s) IV Push every 8 hours PRN Nausea and/or Vomiting  traMADol 50 milliGRAM(s) Oral every 8 hours PRN Severe Pain (7 - 10)        A/P: 1. Fever - now afebrile; normal WBC. Patient was treated with Invanz at the rehab facility for positive sputum culture (Proteus). Patient transferred to  presumably for persistent fever. Chest CT scan does not show findings suggestive of pneumonia. D/W  (ID).Continue antibiotics per ID. Sputum culture on admission shows normal respiratory val.      2. Severe, chronic persistent asthma. Continue current treatment. Patient is chronically on steroids; methylprednisolone 8 mg daily. Continue inhaled bronchodilators/ICS.     3. Hx of tracheomalacia - S/P tracheobronchoplasty. Continue mucolytics (acetylcysteine).    4. Atrial fib. On Eliquis.     5. Hx of Aortic Insufficiency/ASHD. On Eliquis. No signs of decompensated cardiac disease. Continue meds - per hospitalist team.     6. DM and other comorbidities - per hospitalist team.     7. Discharge planning per hospitalist team.

## 2023-03-07 NOTE — PROGRESS NOTE ADULT - SUBJECTIVE AND OBJECTIVE BOX
CC: 72 year old female with history of DM, CAD, Afib, Asthma on chronic steroids, colon cancer sp resection and recent aortic dissection repair 9/22 presents with worsening sob and cough. Patient reports she was being treated for pneumonia at [facility] with Invanz for ESBL proteus however she remained febrile for the past week being on this antibiotic. She was sent into the ED for further evaluation.     Patient seen and eval. Doing well. No need for O2. Denies any HA, CP, SOB. No fevers, chills or shakes. Patient with increased cough /  sputum production /  ronchorus.   PT consulted /  RR consulted /  CM eval initiated for placement in [facility].     PHYSICAL EXAMN:  T(F): 97.7 (06 Mar 2023 08:21), Max: 98.4 (05 Mar 2023 23:44)  HR: 89 (06 Mar 2023 09:50) (68 - 90)  BP: 127/73 (06 Mar 2023 08:21) (118/64 - 127/73)  RR: 17 (06 Mar 2023 08:21) (16 - 17)  SpO2: 94% (06 Mar 2023 09:50) (94% - 100%) nasal cannula,2   Constitutional: Deconditioned, frail, chronically sick appearing lady /  poor functional state.   HEENT: PERR, EOMI  Neck: Soft and supple,  No JVD  Respiratory: Very ronchorus  Cardiovascular: RRR  Gastrointestinal: ostomy bag (surrounding sites look clean /  not infected)  Extremities: No peripheral edema  Vascular: 2+ peripheral pulses  Neurological: A/O x 3, no focal deficits  Musculoskeletal: muscle wasting  Skin: No rashes    RADIOLOGY/EKG:  < from: CT Chest No Cont (03.04.23 @ 13:53) >  In comparison with 12/14/2022, interval decrease of bilateral mucoid   impactions, tree-in-bud opacities and consolidative opacity within left   lower lobe. No new opacity.    LABS: All Labs Reviewed:                     11.0   6.86  )-----------( 220      ( 06 Mar 2023 06:46 )             36.6   138  |  106  |  13  ----------------------------<  225<H>  3.9   |  29  |  0.60  Ca    9.2      06 Mar 2023 06:46  Mg     2.0     03-06  TPro  6.7  /  Alb  2.7<L>  /  TBili  0.2  /  DBili  x   /  AST  16  /  ALT  11<L>  /  AlkPhos  106  03-05       Patient is a 72 year old female, retired ,  with history of DM, CAD, Afib, Asthma on chronic steroids, colon cancer sp resection and recent aortic dissection repair 9/22 presents with worsening sob and cough.     # Acute on chronic respiratory   # Pneumonia suspetced gram negative organism  # ESBL Pneumonia with Proteus on Invanz at [facility]  # Mucoid inpaction  # Chronic respiratory failure in the setting of  Severe Chronic persistent asthma  # H/O Tracheomalacia s/p tracheobronchoplasty   - Sputum for legionella and strep  - cont chronic steroids and inhalers  and acetylcysteine   - reported febrile despite IV abx therapy with ertapenem ?cause ?MDRO ?drug fever  - Meropenem 1 gm IV q8h  - Vancomycin 750 mg IV q12h #   - obtain vancomycin trough level    # Afib  - Continue Eliquis  - Continue mexiletine  - Monitor on remote tele today - can likely dc tele tmr     # DM  - Continue Basal bolus  - note elevated A1C, FS high - will increase lantus from 25 to 30 qhs   - 3/6 - increase Lantus further to 34U given hyperglycemia     # CAD  - Stable  - Continue atorvastatin    # Bladder outlet obstruction  - Continue flomax    # History of MRSA and Candidaemia   - Continue fluconazole and bactrim for now     # GERD  - Continue protonix    # DVT/CVA  prophylaxis  - Eliquis

## 2023-03-07 NOTE — PHARMACOTHERAPY INTERVENTION NOTE - COMMENTS
.  72y Female being treated for Low grade fever. Possible sepsis. Right base pneumonia. Possible aspiration pneumonia with empiric vancomycin.  Height (cm): 162.6 (03-04-23 @ 10:46)  Weight (kg): 59 (03-04-23 @ 10:46)    Creatinine, Serum: 0.60 mg/dL (03-06-23 @ 06:46)  Creatinine, Serum: 0.68 mg/dL (03-05-23 @ 06:35)  Creatinine, Serum: 0.55 mg/dL (03-04-23 @ 12:27)    Current Order:  vancomycin  IVPB 750 milliGRAM(s) IV Intermittent every 12 hours    Vancomycin Levels:   vancomycin  IVPB 750 milliGRAM(s) IV Intermittent every 12 hours (03-06-23 @ 11:52)  Vancomycin Level, Trough: 13.6 ug/mL (03-06-23 @ 21:39) correlates with  mcg*hr/mL  AUC < 600; Patient does not have culture confirmation of MRSA; Continue with trough based monitoring (Trough therapeutic, taken slightly early, predicted trough using above kinetics 11.1, will defer dose changes to ID); Continue to monitor for nephrotoxicity with AUC
Medication history complete. Medications and allergies reviewed with [facility] and confirmed with Dr.First.

## 2023-03-07 NOTE — PHARMACOTHERAPY INTERVENTION NOTE - INTERVENTION CATEGORIES
Call to momLilliana. She was informed of normal chest XR results. Advised mom if pt still is having pain then give our office a call.   
Med Reconciliation
ASP

## 2023-03-08 LAB
ALBUMIN SERPL ELPH-MCNC: 2.7 G/DL — LOW (ref 3.3–5)
ALP SERPL-CCNC: 110 U/L — SIGNIFICANT CHANGE UP (ref 40–120)
ALT FLD-CCNC: 15 U/L — SIGNIFICANT CHANGE UP (ref 12–78)
ANION GAP SERPL CALC-SCNC: 4 MMOL/L — LOW (ref 5–17)
AST SERPL-CCNC: 12 U/L — LOW (ref 15–37)
BILIRUB SERPL-MCNC: 0.2 MG/DL — SIGNIFICANT CHANGE UP (ref 0.2–1.2)
BUN SERPL-MCNC: 18 MG/DL — SIGNIFICANT CHANGE UP (ref 7–23)
CALCIUM SERPL-MCNC: 8.9 MG/DL — SIGNIFICANT CHANGE UP (ref 8.5–10.1)
CHLORIDE SERPL-SCNC: 106 MMOL/L — SIGNIFICANT CHANGE UP (ref 96–108)
CO2 SERPL-SCNC: 26 MMOL/L — SIGNIFICANT CHANGE UP (ref 22–31)
CREAT SERPL-MCNC: 0.56 MG/DL — SIGNIFICANT CHANGE UP (ref 0.5–1.3)
EGFR: 96 ML/MIN/1.73M2 — SIGNIFICANT CHANGE UP
GLUCOSE BLDC GLUCOMTR-MCNC: 217 MG/DL — HIGH (ref 70–99)
GLUCOSE BLDC GLUCOMTR-MCNC: 252 MG/DL — HIGH (ref 70–99)
GLUCOSE BLDC GLUCOMTR-MCNC: 265 MG/DL — HIGH (ref 70–99)
GLUCOSE BLDC GLUCOMTR-MCNC: 268 MG/DL — HIGH (ref 70–99)
GLUCOSE SERPL-MCNC: 242 MG/DL — HIGH (ref 70–99)
HCT VFR BLD CALC: 36.3 % — SIGNIFICANT CHANGE UP (ref 34.5–45)
HGB BLD-MCNC: 10.7 G/DL — LOW (ref 11.5–15.5)
MCHC RBC-ENTMCNC: 20.7 PG — LOW (ref 27–34)
MCHC RBC-ENTMCNC: 29.5 GM/DL — LOW (ref 32–36)
MCV RBC AUTO: 70.2 FL — LOW (ref 80–100)
PLATELET # BLD AUTO: 232 K/UL — SIGNIFICANT CHANGE UP (ref 150–400)
POTASSIUM SERPL-MCNC: 4.2 MMOL/L — SIGNIFICANT CHANGE UP (ref 3.5–5.3)
POTASSIUM SERPL-SCNC: 4.2 MMOL/L — SIGNIFICANT CHANGE UP (ref 3.5–5.3)
PROT SERPL-MCNC: 6.6 GM/DL — SIGNIFICANT CHANGE UP (ref 6–8.3)
RBC # BLD: 5.17 M/UL — SIGNIFICANT CHANGE UP (ref 3.8–5.2)
RBC # FLD: 19.4 % — HIGH (ref 10.3–14.5)
SODIUM SERPL-SCNC: 136 MMOL/L — SIGNIFICANT CHANGE UP (ref 135–145)
WBC # BLD: 8.52 K/UL — SIGNIFICANT CHANGE UP (ref 3.8–10.5)
WBC # FLD AUTO: 8.52 K/UL — SIGNIFICANT CHANGE UP (ref 3.8–10.5)

## 2023-03-08 PROCEDURE — 99233 SBSQ HOSP IP/OBS HIGH 50: CPT

## 2023-03-08 RX ADMIN — POLYETHYLENE GLYCOL 3350 17 GRAM(S): 17 POWDER, FOR SOLUTION ORAL at 09:33

## 2023-03-08 RX ADMIN — Medication 3: at 12:56

## 2023-03-08 RX ADMIN — MAGNESIUM OXIDE 400 MG ORAL TABLET 400 MILLIGRAM(S): 241.3 TABLET ORAL at 13:12

## 2023-03-08 RX ADMIN — MAGNESIUM OXIDE 400 MG ORAL TABLET 400 MILLIGRAM(S): 241.3 TABLET ORAL at 07:46

## 2023-03-08 RX ADMIN — ALBUTEROL 1.25 MILLIGRAM(S): 90 AEROSOL, METERED ORAL at 09:31

## 2023-03-08 RX ADMIN — Medication 3 UNIT(S): at 08:08

## 2023-03-08 RX ADMIN — APIXABAN 5 MILLIGRAM(S): 2.5 TABLET, FILM COATED ORAL at 17:33

## 2023-03-08 RX ADMIN — APIXABAN 5 MILLIGRAM(S): 2.5 TABLET, FILM COATED ORAL at 06:04

## 2023-03-08 RX ADMIN — Medication 3: at 17:10

## 2023-03-08 RX ADMIN — TAMSULOSIN HYDROCHLORIDE 0.4 MILLIGRAM(S): 0.4 CAPSULE ORAL at 21:29

## 2023-03-08 RX ADMIN — POLYETHYLENE GLYCOL 3350 17 GRAM(S): 17 POWDER, FOR SOLUTION ORAL at 21:28

## 2023-03-08 RX ADMIN — Medication 4 MILLILITER(S): at 14:31

## 2023-03-08 RX ADMIN — Medication 4 MILLILITER(S): at 09:33

## 2023-03-08 RX ADMIN — ALBUTEROL 1.25 MILLIGRAM(S): 90 AEROSOL, METERED ORAL at 21:56

## 2023-03-08 RX ADMIN — Medication 3 UNIT(S): at 17:10

## 2023-03-08 RX ADMIN — Medication 2: at 08:08

## 2023-03-08 RX ADMIN — Medication 0.5 MILLIGRAM(S): at 21:56

## 2023-03-08 RX ADMIN — MEXILETINE HYDROCHLORIDE 200 MILLIGRAM(S): 150 CAPSULE ORAL at 13:11

## 2023-03-08 RX ADMIN — MEXILETINE HYDROCHLORIDE 200 MILLIGRAM(S): 150 CAPSULE ORAL at 21:28

## 2023-03-08 RX ADMIN — MEXILETINE HYDROCHLORIDE 200 MILLIGRAM(S): 150 CAPSULE ORAL at 06:04

## 2023-03-08 RX ADMIN — MAGNESIUM OXIDE 400 MG ORAL TABLET 400 MILLIGRAM(S): 241.3 TABLET ORAL at 21:28

## 2023-03-08 RX ADMIN — Medication 3 UNIT(S): at 12:55

## 2023-03-08 RX ADMIN — MEROPENEM 100 MILLIGRAM(S): 1 INJECTION INTRAVENOUS at 06:03

## 2023-03-08 RX ADMIN — TIOTROPIUM BROMIDE 2 PUFF(S): 18 CAPSULE ORAL; RESPIRATORY (INHALATION) at 09:33

## 2023-03-08 RX ADMIN — ALBUTEROL 1.25 MILLIGRAM(S): 90 AEROSOL, METERED ORAL at 14:31

## 2023-03-08 RX ADMIN — Medication 125 MILLIGRAM(S): at 09:33

## 2023-03-08 RX ADMIN — MEROPENEM 100 MILLIGRAM(S): 1 INJECTION INTRAVENOUS at 21:29

## 2023-03-08 RX ADMIN — Medication 8 MILLIGRAM(S): at 09:33

## 2023-03-08 RX ADMIN — Medication 4 MILLILITER(S): at 21:56

## 2023-03-08 RX ADMIN — PANTOPRAZOLE SODIUM 40 MILLIGRAM(S): 20 TABLET, DELAYED RELEASE ORAL at 09:33

## 2023-03-08 RX ADMIN — ATORVASTATIN CALCIUM 20 MILLIGRAM(S): 80 TABLET, FILM COATED ORAL at 21:29

## 2023-03-08 RX ADMIN — Medication 1 APPLICATION(S): at 21:30

## 2023-03-08 RX ADMIN — INSULIN GLARGINE 34 UNIT(S): 100 INJECTION, SOLUTION SUBCUTANEOUS at 21:29

## 2023-03-08 RX ADMIN — Medication 0.5 MILLIGRAM(S): at 09:32

## 2023-03-08 RX ADMIN — MEROPENEM 100 MILLIGRAM(S): 1 INJECTION INTRAVENOUS at 13:10

## 2023-03-08 RX ADMIN — Medication 1 TABLET(S): at 09:33

## 2023-03-08 NOTE — PHYSICAL THERAPY INITIAL EVALUATION ADULT - GAIT PATTERN USED, PT EVAL
impaired endurance, min discomfort L leg/foot due to heel wound, adative mm shortening PF/INV mm , able to achieve L foot flat/2-point gait

## 2023-03-08 NOTE — PHYSICAL THERAPY INITIAL EVALUATION ADULT - ADDITIONAL COMMENTS
pt has been at [facility] on/off since 12/22 ,and was previously at [facility] last year ,unclear how many rehab days pt has left , d/w JÚNIOR De Leon

## 2023-03-08 NOTE — PROGRESS NOTE ADULT - SUBJECTIVE AND OBJECTIVE BOX
Patient is a 72 year old female with history of DM, CAD, Afib, Asthma on chronic steroids, colon cancer sp resection and recent aortic dissection repair 9/22 presents with worsening sob and cough. Patient reports she was being treated for pneumonia at [facility] with Invanz for ESBL proteus however she remained febrile for the past week being on this antibiotic. She was sent into the ED for further evaluation.     Patient seen and eval. Doing well. No need for O2. Denies any HA, CP, SOB. No fevers, chills or shakes. Patient with increased cough /  sputum production /  ronchorus.   PT consulted /  RR consulted /  CM eval initiated for placement in [facility].     PHYSICAL EXAMN:  T(F): 97.7 (06 Mar 2023 08:21), Max: 98.4 (05 Mar 2023 23:44)  HR: 89 (06 Mar 2023 09:50) (68 - 90)  BP: 127/73 (06 Mar 2023 08:21) (118/64 - 127/73)  RR: 17 (06 Mar 2023 08:21) (16 - 17)  SpO2: 94% (06 Mar 2023 09:50) (94% - 100%) nasal cannula,2   Constitutional: Deconditioned, frail, chronically sick appearing lady /  poor functional state.   HEENT: PERR, EOMI  Neck: Soft and supple,  No JVD  Respiratory: Very ronchorus  Cardiovascular: RRR  Gastrointestinal: ostomy bag (surrounding sites look clean /  not infected)  Extremities: No peripheral edema  Vascular: 2+ peripheral pulses  Neurological: A/O x 3, no focal deficits  Musculoskeletal: muscle wasting  Skin: No rashes    RADIOLOGY/EKG:  < from: CT Chest No Cont (03.04.23 @ 13:53) >  In comparison with 12/14/2022, interval decrease of bilateral mucoid   impactions, tree-in-bud opacities and consolidative opacity within left   lower lobe. No new opacity.    LABS: All Labs Reviewed:                     11.0   6.86  )-----------( 220      ( 06 Mar 2023 06:46 )             36.6   138  |  106  |  13  ----------------------------<  225<H>  3.9   |  29  |  0.60  Ca    9.2      06 Mar 2023 06:46  Mg     2.0     03-06  TPro  6.7  /  Alb  2.7<L>  /  TBili  0.2  /  DBili  x   /  AST  16  /  ALT  11<L>  /  AlkPhos  106  03-05       Patient is a 72 year old female, retired ,  with history of DM, CAD, Afib, Asthma on chronic steroids, colon cancer sp resection and recent aortic dissection repair 9/22 presents with worsening sob and cough.     # Acute on chronic respiratory   # Pneumonia suspetced gram negative organism  # ESBL Pneumonia with Proteus on Invanz at [facility]  # Mucoid inpaction  # Chronic respiratory failure in the setting of  Severe Chronic persistent asthma  # H/O Tracheomalacia s/p tracheobronchoplasty   - Sputum for legionella and strep  - cont chronic steroids and inhalers  and acetylcysteine   - reported febrile despite IV abx therapy with ertapenem ?cause ?MDRO ?drug fever  - Meropenem 1 gm IV q8h x more days  - D/C IV vancomycin  - obtain vancomycin trough level    # Afib  - Continue Eliquis  - Continue mexiletine  - Monitor on remote tele today - can likely dc tele tmr     # DM  - Continue Basal bolus  - note elevated A1C, FS high - will increase lantus from 25 to 30 qhs   - 3/6 - increase Lantus further to 34U given hyperglycemia     # CAD  - Stable  - Continue atorvastatin    # Bladder outlet obstruction  - Continue flomax    # History of MRSA and Candidaemia   - Continue fluconazole and bactrim for now     # GERD  - Continue protonix    # DVT/CVA  prophylaxis  - Eliquis

## 2023-03-08 NOTE — PHYSICAL THERAPY INITIAL EVALUATION ADULT - NS ASR WT BEARING DETAIL LLE
pt with acute L heel wound , healed wounds R medial/lateral malleoli, R posterior elbow/weight-bearing as tolerated

## 2023-03-08 NOTE — PHYSICAL THERAPY INITIAL EVALUATION ADULT - PRECAUTIONS/LIMITATIONS, REHAB EVAL
fall precautions/isolation precautions h/o ESBL/Proteus mirabilis 1/3/23/fall precautions/isolation precautions

## 2023-03-08 NOTE — PHYSICAL THERAPY INITIAL EVALUATION ADULT - PASSIVE RANGE OF MOTION EXAMINATION, REHAB EVAL
PROM L ankle DF to eutral , EVER >10 degrees , knee FLEX L >100 degrees/Left LE Passive ROM was WFL (within functional limits)

## 2023-03-08 NOTE — PHYSICAL THERAPY INITIAL EVALUATION ADULT - NSPTDISCHREC_GEN_A_CORE
came from [facility] where she has been a patient on/off since 12/22/22 (>2.5 months )/Sub-acute Rehab

## 2023-03-08 NOTE — PHYSICAL THERAPY INITIAL EVALUATION ADULT - ACTIVE RANGE OF MOTION EXAMINATION, REHAB EVAL
L ankle equinovarus deformity with limited DF/EV/bilateral upper extremity Active ROM was WFL (within functional limits)/Right LE Active ROM was WFL (within functional limits)/deficits as listed below

## 2023-03-08 NOTE — PHYSICAL THERAPY INITIAL EVALUATION ADULT - PATIENT PROFILE REVIEW, REHAB EVAL
yes pt is a native of [place], [place] ,has been on steroids since age 11 years for adrenal insufficiency , suffers from chronic severe asthma/yes

## 2023-03-08 NOTE — PROGRESS NOTE ADULT - NUTRITIONAL ASSESSMENT
This patient has been assessed with a concern for Malnutrition and has been determined to have a diagnosis/diagnoses of Severe protein-calorie malnutrition.    This patient is being managed with:   Diet Consistent Carbohydrate w/Evening Snack-  Entered: Mar  4 2023  4:32PM    

## 2023-03-08 NOTE — PHYSICAL THERAPY INITIAL EVALUATION ADULT - RISK REDUCTION/PREVENTION, PT EVAL
chronic severe asthma, superimposed PNA ,/risk factors/recurrence of condition/secondary impairments immune

## 2023-03-08 NOTE — PHYSICAL THERAPY INITIAL EVALUATION ADULT - DIAGNOSIS, PT EVAL
SOB/cough, severe persistent asthma , PNA (ESBL/Proteus Mirabilis) being treated with Invanz @ [facility];

## 2023-03-08 NOTE — PHYSICAL THERAPY INITIAL EVALUATION ADULT - IMPAIRMENTS FOUND, PT EVAL
equinovarus deformity L ankle/foot, severe intrinsic B foot mm. atrophy/aerobic capacity/endurance/joint integrity and mobility/muscle strength/ROM

## 2023-03-08 NOTE — PHYSICAL THERAPY INITIAL EVALUATION ADULT - PERTINENT HX OF CURRENT PROBLEM, REHAB EVAL
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 Post-op course complicated by acute hypoxemic respiratory failure, shock, anemia, and hyperglycemia requiring insulin gtt. Intubated 9/6,Extubated 9/13; Pt had prolonged hospital course.  Developed Rt elbow eschar;  Pt noted to have fluid over right elbow s/p IR drainage which grew proteus.  Now reaccumulated with fluid. s/p excisional debridement by Plastics,treated with wound VAC,discharged to [facility] 11/22/22,rehosp with +COVID 11/24-11/30/22 at [facility], discharged to [facility] ,has been in/out of [facility],last returned 1/12/23 72 year-old female with a history of DM2, CAD, A-Fib on apixaban, Severe Persistent Asthma (on chronic steroids, recently started on Tezspire), colon cancer s/p resection/chemo, and tracheobronchomalacia s/p tracheoplasty, and recent OM of the R foot s/b debridement and completed course of Vancomycin/Ertapenem for MRSA/ESBL Proteus/Corynebacterium ,h/o chest pain, found to have Type A dissection  s/p repair with modified Bentall procedure and hemiarch replacement on 9/6/22. Post-op course complicated by acute hypoxemic respiratory failure, shock, anemia, and hyperglycemia requiring insulin gtt. Intubated 9/6,Extubated 9/13; Pt had prolonged hospital course, developed Rt elbow eschar;  fluid over right elbow s/p IR drainage which grew proteus, s/p excisional debridement by Plastics, treated with wound VAC 10/2022 discharged to [facility] 11/22/22,rehosp with +COVID 11/24-11/30/22 at [facility], discharged to [facility] ,has been in/out of [facility] ,, [facility], last returned to [facility] 1/12/23

## 2023-03-08 NOTE — PROGRESS NOTE ADULT - SUBJECTIVE AND OBJECTIVE BOX
RAYRAY RODRIGUEZ  MRN: [id number]    S: HPI (per admission H & P):  72 year old female with history of DM, CAD, Afib, Asthma on chronic steroids, colon cancer sp resection and recent aortic dissection repair 9/22 presents with worsening sob and cough. Patient reports she was being treated for pneumonia at [facility] with Invanz for ESBL proteus however she remained febrile for the past week being on this antibiotic. She was sent into the ED for further evaluation.    (04 Mar 2023 16:07)    3/5/2023: Patient resting comfortably. Intermittent cough especially when taking a deep breath. Denies hemoptysis. Scant sputum production. Patient reports she was having chest discomfort at [facility] but currently denies any pain.     3/6/2023: Persistent cough. Afebrilr. Sputum culture result noted. Blood c & s negative.     3/7/2023: Less cough. Feels improved. Afebrile.     3/8/2023: Comfortable lying flat in bed. Admits to occasional cough which is less productive.       Patient is followed as an outpatient by Dr. Allison (pulmonologist in [place]).         PAST MEDICAL & SURGICAL HISTORY:  Atrial fibrillation  paroxysmal, on eliquis      Diabetes  Type 2      COPD (chronic obstructive pulmonary disease)      Adrenal insufficiency  Medrol daily for over 50 years      Aortic insufficiency  moderate AR on echo 5/3/2018      Pelvic fracture      Asthma      Tracheobronchomalacia  diagnosed 2015, s/p bronchial thermoplasty 2016 (Dr Zapien); recent bronchoscopy 6/5/2018 revealed no evidence of tracheobronchomalacia in trachea or bronchial tubes      Colorectal cancer  4/2018- last treatment , chemo and radiation      Rectal bleeding      Seizure  x 1 1/7/18      DVT (deep venous thrombosis)  15-20 years ago, took coumadin      TIA (transient ischemic attack)  multiple, last 5 years ago - presents as right-sided weakness      History of partial hysterectomy  30 years ago - fibroids      H/O total knee replacement, bilateral  5 years ago      History of sinus surgery  multiple sinus surgeries      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      H/O pelvic surgery  5 years ago - s/p fracture      History of tracheomalacia  2015 - attempted tracheal stenting ([facility])- course complicated by obstruction, respiratory failure, multiple CPR attempts -  stent discontinued; 10/20/2016 Tracheobronchoplasty (Prolene Mesh) performed at [facility] by Dr Zapien      S/P bronchoscopy  6/5/2018 - [place] (Dr Zapien) no evidence of tracheobronchomalacia in trachea or bronchial tubes      Rectal bleeding  exam under anesthesia ([facility]) 2/2018          O: T(C): 36.5 (03-08-23 @ 09:07), Max: 36.9 (03-07-23 @ 16:10)  HR: 78 (03-08-23 @ 09:07) (72 - 97)  BP: 107/62 (03-08-23 @ 09:07) (102/69 - 126/70)  RR: 17 (03-08-23 @ 09:07) (17 - 18)  SpO2: 99% (03-08-23 @ 09:07) (95% - 100%)  Wt(kg): --    PHYSICAL EXAM:      GENERAL: comfortable    NEURO: awake / alert    NECK: no JVD    CHEST: occ rhonchi; no wheezing    CARDIAC: RR    EXT: no edema      LABS:    Culture - Sputum . (03.05.23 @ 14:20)   Gram Stain:   Few Squamous epithelial cells per low power field   Few polymorphonuclear leukocytes per low power field   No organisms seen per oil power field   Specimen Source: .Sputum Sputum   Culture Results:   Normal Respiratory Val present                         11.0   6.86  )-----------( 220      ( 06 Mar 2023 06:46 )             36.6       03-06    138  |  106  |  13  ----------------------------<  225<H>  3.9   |  29  |  0.60    Ca    9.2      06 Mar 2023 06:46  Mg     2.0     03-06    RADIOLOGY:      EXAM:  CT CHEST   ORDERED BY: SHERRY MARX   PROCEDURE DATE:  03/04/2023        INTERPRETATION:  INDICATION: Abnormal chest CT  TECHNIQUE: Unenhanced CT of the chest. Coronal, sagittal, and MIP images   were reconstructed and reviewed.  COMPARISON: 12/14/2022 chest CT.    FINDINGS:    AIRWAYS, LUNGS and PLEURA: Patent central airways. Mild paraseptal   emphysema. In comparison with 12/14/2022, interval decrease of bilateral   mucoid impactions, tree-in-bud opacities and consolidative opacity within   left lower lobe. No new opacity. No pleural effusion.    LYMPH NODES, MEDIASTINUM AND MARANDA: No lymphadenopathy.    HEART AND VESSELS: Heart size is normal. No pericardial effusion. Status   post aortic valve and ascending aorta replacement. Mild coronary   calcifications.    VISUALIZED UPPER ABDOMEN: Within normal limits.    CHEST WALL AND BONES: No aggressive osseous lesion. Intact sternotomy.    LOWER NECK: Within normal limits.    IMPRESSION:    In comparison with 12/14/2022, interval decrease of bilateral mucoid   impactions, tree-in-bud opacities and consolidative opacity within left   lower lobe. No new opacity.      NABILA BROWN MD; Attending Radiologist                MEDICATIONS  (STANDING):  acetylcysteine 10%  Inhalation 4 milliLiter(s) Inhalation every 8 hours  albuterol    0.083% 1.25 milliGRAM(s) Nebulizer every 6 hours  apixaban 5 milliGRAM(s) Oral two times a day  atorvastatin 20 milliGRAM(s) Oral at bedtime  buDESOnide    Inhalation Suspension 0.5 milliGRAM(s) Inhalation two times a day  collagenase Ointment 1 Application(s) Topical daily  dextrose 5%. 1000 milliLiter(s) (50 mL/Hr) IV Continuous <Continuous>  dextrose 5%. 1000 milliLiter(s) (100 mL/Hr) IV Continuous <Continuous>  dextrose 50% Injectable 25 Gram(s) IV Push once  dextrose 50% Injectable 12.5 Gram(s) IV Push once  dextrose 50% Injectable 25 Gram(s) IV Push once  glucagon  Injectable 1 milliGRAM(s) IntraMuscular once  insulin glargine Injectable (LANTUS) 34 Unit(s) SubCutaneous at bedtime  insulin lispro (ADMELOG) corrective regimen sliding scale   SubCutaneous three times a day before meals  insulin lispro Injectable (ADMELOG) 3 Unit(s) SubCutaneous three times a day before meals  magnesium oxide 400 milliGRAM(s) Oral every 8 hours  meropenem  IVPB 1000 milliGRAM(s) IV Intermittent every 8 hours  methylPREDNISolone 8 milliGRAM(s) Oral daily  mexiletine 200 milliGRAM(s) Oral every 8 hours  pantoprazole    Tablet 40 milliGRAM(s) Oral before breakfast  polyethylene glycol 3350 17 Gram(s) Oral two times a day  tamsulosin 0.4 milliGRAM(s) Oral at bedtime  tiotropium 2.5 MICROgram(s) Inhaler 2 Puff(s) Inhalation daily  trimethoprim   80 mG/sulfamethoxazole 400 mG 1 Tablet(s) Oral daily  vancomycin  IVPB 750 milliGRAM(s) IV Intermittent every 12 hours    MEDICATIONS  (PRN):  acetaminophen     Tablet .. 650 milliGRAM(s) Oral every 6 hours PRN Temp greater or equal to 38C (100.4F), Moderate Pain (4 - 6)  aluminum hydroxide/magnesium hydroxide/simethicone Suspension 30 milliLiter(s) Oral every 4 hours PRN Dyspepsia  dextrose Oral Gel 15 Gram(s) Oral once PRN Blood Glucose LESS THAN 70 milliGRAM(s)/deciliter  melatonin 3 milliGRAM(s) Oral at bedtime PRN Insomnia  ondansetron   Disintegrating Tablet 4 milliGRAM(s) Oral every 6 hours PRN Nausea and/or Vomiting  ondansetron Injectable 4 milliGRAM(s) IV Push every 8 hours PRN Nausea and/or Vomiting  traMADol 50 milliGRAM(s) Oral every 8 hours PRN Severe Pain (7 - 10)        A/P: . Fever - now afebrile; normal WBC. Patient was treated with Invanz at the rehab facility for positive sputum culture (Proteus). Patient transferred to  presumably for persistent fever. Chest CT scan does not show findings suggestive of pneumonia. Continue antibiotics per ID. Sputum culture on admission shows normal respiratory val.      2. Severe, chronic persistent asthma. Continue current treatment. Patient is chronically on steroids; methylprednisolone 8 mg daily. Continue inhaled bronchodilators/ICS.     3. Hx of tracheomalacia - S/P tracheobronchoplasty. Continue mucolytics (acetylcysteine).    4. Atrial fib. On Eliquis.     5. Hx of Aortic Insufficiency/ASHD. On Eliquis. No signs of decompensated cardiac disease. Continue meds - per hospitalist team.     6. DM and other comorbidities - per hospitalist team.     7. Discharge planning per hospitalist team.       I will no longer follow daily. Please call if needed.

## 2023-03-08 NOTE — PHYSICAL THERAPY INITIAL EVALUATION ADULT - RANGE OF MOTION EXAMINATION, REHAB EVAL
mild decreased FLEXION noted L hip/knee ,AROM L ankle  DF -10 degrees of neutral, absent EVER/deficits as listed below

## 2023-03-08 NOTE — PHYSICAL THERAPY INITIAL EVALUATION ADULT - PATIENT/FAMILY/SIGNIFICANT OTHER GOALS STATEMENT, PT EVAL
Pt states she has been in/out of hospital ,rehab past 1.5 years , including comminuted pelvic fxs requiring titanium implants

## 2023-03-08 NOTE — PHYSICAL THERAPY INITIAL EVALUATION ADULT - GENERAL OBSERVATIONS, REHAB EVAL
resting supine in bed , awake, alert, Ox3, pleasant & cooperative,+exopthalmia OS >>OD,  +equinovarus deformity noted L foot with large foam dressing to L heel , smaller foam dressings to R med/lat malleoli ankle, R post.elbow

## 2023-03-08 NOTE — PHYSICAL THERAPY INITIAL EVALUATION ADULT - SKIN INTEGRITY
healed wounds R ankle med/lateral malleoli , R posterior elbow ; PU L posterior heel ( does not like Total CAIR boot provided , refusing to wear) well healed surgical scars B knees s/p TKRs; Wound Care RN Ty ENRIQUEZ in to evaluate/scar/healed wound/ulcers (specify)/wound/pressure injury

## 2023-03-08 NOTE — PROGRESS NOTE ADULT - SUBJECTIVE AND OBJECTIVE BOX
Date of service: 03-08-23 @ 09:40    Lying in bed in NAD  Has dry cough  No SOB at rest    ROS: no fever or chills; denies dizziness, no HA, no abdominal pain, no diarrhea or constipation; no dysuria, no legs pain, no rashes    MEDICATIONS  (STANDING):  acetylcysteine 10%  Inhalation 4 milliLiter(s) Inhalation every 8 hours  albuterol    0.083% 1.25 milliGRAM(s) Nebulizer every 6 hours  apixaban 5 milliGRAM(s) Oral two times a day  atorvastatin 20 milliGRAM(s) Oral at bedtime  buDESOnide    Inhalation Suspension 0.5 milliGRAM(s) Inhalation two times a day  collagenase Ointment 1 Application(s) Topical daily  dextrose 5%. 1000 milliLiter(s) (50 mL/Hr) IV Continuous <Continuous>  dextrose 5%. 1000 milliLiter(s) (100 mL/Hr) IV Continuous <Continuous>  dextrose 50% Injectable 25 Gram(s) IV Push once  dextrose 50% Injectable 12.5 Gram(s) IV Push once  dextrose 50% Injectable 25 Gram(s) IV Push once  glucagon  Injectable 1 milliGRAM(s) IntraMuscular once  insulin glargine Injectable (LANTUS) 34 Unit(s) SubCutaneous at bedtime  insulin lispro (ADMELOG) corrective regimen sliding scale   SubCutaneous three times a day before meals  insulin lispro Injectable (ADMELOG) 3 Unit(s) SubCutaneous three times a day before meals  magnesium oxide 400 milliGRAM(s) Oral every 8 hours  meropenem  IVPB 1000 milliGRAM(s) IV Intermittent every 8 hours  methylPREDNISolone 8 milliGRAM(s) Oral daily  mexiletine 200 milliGRAM(s) Oral every 8 hours  pantoprazole    Tablet 40 milliGRAM(s) Oral before breakfast  polyethylene glycol 3350 17 Gram(s) Oral two times a day  tamsulosin 0.4 milliGRAM(s) Oral at bedtime  tiotropium 2.5 MICROgram(s) Inhaler 2 Puff(s) Inhalation daily  trimethoprim   80 mG/sulfamethoxazole 400 mG 1 Tablet(s) Oral daily  vancomycin  IVPB 750 milliGRAM(s) IV Intermittent every 12 hours    Vital Signs Last 24 Hrs  T(C): 36.5 (08 Mar 2023 09:07), Max: 36.9 (07 Mar 2023 16:10)  T(F): 97.7 (08 Mar 2023 09:07), Max: 98.4 (07 Mar 2023 16:10)  HR: 78 (08 Mar 2023 09:07) (72 - 97)  BP: 107/62 (08 Mar 2023 09:07) (102/69 - 126/70)  BP(mean): --  RR: 17 (08 Mar 2023 09:07) (17 - 18)  SpO2: 99% (08 Mar 2023 09:07) (95% - 100%)    Parameters below as of 08 Mar 2023 09:07  Patient On (Oxygen Delivery Method): room air     Physical exam:    Constitutional:  No acute distress  HEENT: NC/AT, EOMI, PERRLA, conjunctivae clear; ears and nose atraumatic  Neck: supple; thyroid not palpable  Back: no tenderness  Respiratory: respiratory effort normal; crackles at bases  Cardiovascular: S1S2 regular, no murmurs  Abdomen: soft, not tender, not distended, positive BS  Genitourinary: no suprapubic tenderness  Lymphatic: no LN palpable  Musculoskeletal: no muscle tenderness, no joint swelling or tenderness  Extremities: no pedal edema  Neurological/ Psychiatric: AxOx3, moving all extremities  Skin: no rashes; no palpable lesions    Labs: reviewed    Vancomycin Level, Trough: 13.6 ug/mL (03-06 @ 21:39)                        10.8   6.27  )-----------( 239      ( 05 Mar 2023 06:35 )             36.7     03-05    135  |  106  |  15  ----------------------------<  328<H>  4.4   |  25  |  0.68    Ca    9.1      05 Mar 2023 06:35  Mg     2.0     03-04    TPro  6.7  /  Alb  2.7<L>  /  TBili  0.2  /  DBili  x   /  AST  16  /  ALT  11<L>  /  AlkPhos  106  03-05     LIVER FUNCTIONS - ( 05 Mar 2023 06:35 )  Alb: 2.7 g/dL / Pro: 6.7 gm/dL / ALK PHOS: 106 U/L / ALT: 11 U/L / AST: 16 U/L / GGT: x             Culture - Sputum (collected 05 Mar 2023 14:20)  Source: .Sputum Sputum  Gram Stain (05 Mar 2023 20:44):    Few Squamous epithelial cells per low power field    Few polymorphonuclear leukocytes per low power field    No organisms seen per oil power field  Final Report (07 Mar 2023 21:16):    Normal Respiratory Jessica present    Culture - Blood (collected 04 Mar 2023 11:47)  Source: .Blood Blood  Preliminary Report (05 Mar 2023 18:02):    No growth to date.    Radiology: all available radiological tests reviewed    < from: CT Chest No Cont (03.04.23 @ 13:53) >  In comparison with 12/14/2022, interval decrease of bilateral mucoid   impactions, tree-in-bud opacities and consolidative opacity within left   lower lobe. No new opacity.  < end of copied text >    < from: Xray Chest 1 View- PORTABLE-Urgent (03.04.23 @ 11:32) >  IMPRESSION: Patchy infiltrate at the right base for which follow-up   radiographs aresuggested.  < end of copied text >    Advanced directives addressed: full resuscitation

## 2023-03-08 NOTE — PROGRESS NOTE ADULT - ASSESSMENT
71 y/o female with h/o DM type 2, CAD, A.fib, asthma on chronic steroids, colon cancer s/p resection, aortic dissection repair 9/22 was admitted on 3/4 for worsening sob and cough. Patient reports she was being treated for pneumonia at [facility] with Invanz for ESBL proteus however she remained febrile for the past week despite being on this antibiotic. She was sent into the ED for further evaluation. In ER she was noted with low grade fever to 100.1F and received meropenem, vancomycin IV, fluconazole IV and bactrim PO.    1. Low grade fever. Possible sepsis. Right base pneumonia. Possible aspiration pneumonia. Allergy to PCN, cefepime (anaphylaxis).  -mucoid impaction  -reported febrile despite IV abx therapy with ertapenem ?cause ?MDRO ?drug fever  -BC x 2, sputum c/s noted  -abx choice is limited to multiple abx allergies  -on meropenem 1 gm IV q8h and vancomycin 750 mg IV q12h # 4  -tolerating abx well so far; no side effects noted  -obtain vancomycin trough level  -monitor closely in donnie of PCN allergy history  -no evidence of MRSA - d/c vancomycin   -pulmonary evaluation appreciated  -respiratory care  -continue abx coverage for one more day  -f/u cultures  -monitor temps  -f/u CBC  -supportive care  2. Other issues:   -care per medicine

## 2023-03-09 ENCOUNTER — TRANSCRIPTION ENCOUNTER (OUTPATIENT)
Age: 73
End: 2023-03-09

## 2023-03-09 VITALS
TEMPERATURE: 99 F | DIASTOLIC BLOOD PRESSURE: 59 MMHG | OXYGEN SATURATION: 98 % | HEART RATE: 84 BPM | SYSTOLIC BLOOD PRESSURE: 124 MMHG | RESPIRATION RATE: 16 BRPM

## 2023-03-09 LAB
CULTURE RESULTS: SIGNIFICANT CHANGE UP
GLUCOSE BLDC GLUCOMTR-MCNC: 180 MG/DL — HIGH (ref 70–99)
GLUCOSE BLDC GLUCOMTR-MCNC: 218 MG/DL — HIGH (ref 70–99)
SPECIMEN SOURCE: SIGNIFICANT CHANGE UP

## 2023-03-09 PROCEDURE — 99239 HOSP IP/OBS DSCHRG MGMT >30: CPT

## 2023-03-09 RX ORDER — ALBUTEROL 90 UG/1
1 AEROSOL, METERED ORAL
Qty: 0 | Refills: 0 | DISCHARGE
Start: 2023-03-09

## 2023-03-09 RX ADMIN — Medication 3 UNIT(S): at 09:01

## 2023-03-09 RX ADMIN — Medication 3 UNIT(S): at 13:18

## 2023-03-09 RX ADMIN — Medication 0.5 MILLIGRAM(S): at 08:38

## 2023-03-09 RX ADMIN — MAGNESIUM OXIDE 400 MG ORAL TABLET 400 MILLIGRAM(S): 241.3 TABLET ORAL at 05:43

## 2023-03-09 RX ADMIN — Medication 1: at 12:51

## 2023-03-09 RX ADMIN — APIXABAN 5 MILLIGRAM(S): 2.5 TABLET, FILM COATED ORAL at 05:44

## 2023-03-09 RX ADMIN — TIOTROPIUM BROMIDE 2 PUFF(S): 18 CAPSULE ORAL; RESPIRATORY (INHALATION) at 08:39

## 2023-03-09 RX ADMIN — Medication 4 MILLILITER(S): at 08:38

## 2023-03-09 RX ADMIN — ALBUTEROL 1.25 MILLIGRAM(S): 90 AEROSOL, METERED ORAL at 08:38

## 2023-03-09 RX ADMIN — MEXILETINE HYDROCHLORIDE 200 MILLIGRAM(S): 150 CAPSULE ORAL at 05:44

## 2023-03-09 RX ADMIN — Medication 2: at 08:43

## 2023-03-09 RX ADMIN — PANTOPRAZOLE SODIUM 40 MILLIGRAM(S): 20 TABLET, DELAYED RELEASE ORAL at 05:43

## 2023-03-09 RX ADMIN — POLYETHYLENE GLYCOL 3350 17 GRAM(S): 17 POWDER, FOR SOLUTION ORAL at 10:42

## 2023-03-09 RX ADMIN — MAGNESIUM OXIDE 400 MG ORAL TABLET 400 MILLIGRAM(S): 241.3 TABLET ORAL at 15:46

## 2023-03-09 RX ADMIN — MEROPENEM 100 MILLIGRAM(S): 1 INJECTION INTRAVENOUS at 05:44

## 2023-03-09 RX ADMIN — Medication 1 TABLET(S): at 10:43

## 2023-03-09 RX ADMIN — Medication 8 MILLIGRAM(S): at 10:42

## 2023-03-09 NOTE — DISCHARGE NOTE PROVIDER - NSDCFUSCHEDAPPT_GEN_ALL_CORE_FT
Kina Jackson  [facility]  PULMMED [address]  Scheduled Appointment: 03/16/2023    [facility]  PULMMED [address]  Scheduled Appointment: 03/16/2023    Eulalio Allison  [facility]  PULMMED [address]  Scheduled Appointment: 05/31/2023    [facility]  PULMMED [address]  Scheduled Appointment: 05/31/2023

## 2023-03-09 NOTE — PROGRESS NOTE ADULT - PROVIDER SPECIALTY LIST ADULT
Hospitalist
Infectious Disease
Pulmonology
Pulmonology
Hospitalist
Hospitalist
Infectious Disease
Pulmonology
Infectious Disease
Hospitalist

## 2023-03-09 NOTE — DISCHARGE NOTE PROVIDER - NSDCCPCAREPLAN_GEN_ALL_CORE_FT
PRINCIPAL DISCHARGE DIAGNOSIS  Diagnosis: Pneumonia  Assessment and Plan of Treatment: # Acute on chronic respiratory   # Pneumonia suspetced gram negative organism  # ESBL Pneumonia with Proteus on Invanz at [facility]  # Mucoid inpaction  # Chronic respiratory failure in the setting of  Severe Chronic persistent asthma  # H/O Tracheomalacia s/p tracheobronchoplasty   - Sputum for legionella and strep  - cont chronic steroids and inhalers  and acetylcysteine   - reported febrile despite IV abx therapy with ertapenem ?cause ?MDRO ?drug fever  - s/p full course of antibiotics           SECONDARY DISCHARGE DIAGNOSES  Diagnosis: Chronic atrial fibrillation  Assessment and Plan of Treatment: - Continue Eliquis  - Continue mexiletine  - Monitor on remote tele today - can likely dc tele tmr

## 2023-03-09 NOTE — DISCHARGE NOTE NURSING/CASE MANAGEMENT/SOCIAL WORK - NSDCFUADDAPPT_GEN_ALL_CORE_FT
It is extremely important for the patient to start following very closely with primary care physician    Please follow up with pulmonary doctor as an outpatient - within 1 week

## 2023-03-09 NOTE — DISCHARGE NOTE NURSING/CASE MANAGEMENT/SOCIAL WORK - NSDCVIVACCINE_GEN_ALL_CORE_FT
COVID-19, mRNA, LNP-S, PF, 100 mcg/ 0.5 mL dose (Moderna); 06-Dec-2021 17:12; Afshan Lew (RADHA); [employer] [id number] (Exp. Date: 22-Dec-2021); IntraMuscular; Deltoid Left.; 0.25 milliLiter(s);

## 2023-03-09 NOTE — DISCHARGE NOTE PROVIDER - NSDCFUADDAPPT_GEN_ALL_CORE_FT
It is extremely important for the patient to start following very closely with primary care physician    Please follow up with pulmonary doctor as an outpatient - within 1 weeek It is extremely important for the patient to start following very closely with primary care physician    Please follow up with pulmonary doctor as an outpatient - within 1 week

## 2023-03-09 NOTE — DISCHARGE NOTE PROVIDER - CARE PROVIDER_API CALL
Huseyin Rutherford  INFECTIOUS DISEASE  [address], [address]  [place] [address]  Phone: [phone]  Fax: [phone]  Follow Up Time:

## 2023-03-09 NOTE — DISCHARGE NOTE PROVIDER - DETAILS OF MALNUTRITION DIAGNOSIS/DIAGNOSES
This patient has been assessed with a concern for Malnutrition and was treated during this hospitalization for the following Nutrition diagnosis/diagnoses:     -  03/06/2023: Severe protein-calorie malnutrition

## 2023-03-09 NOTE — PROGRESS NOTE ADULT - SUBJECTIVE AND OBJECTIVE BOX
Date of service: 03-09-23 @ 09:01    Sitting in bed in NAD  Feels weak  Has dry cough  No SOB at rest    ROS: no fever or chills; denies dizziness, no HA, no abdominal pain, no diarrhea or constipation; no dysuria, no legs pain, no rashes    MEDICATIONS  (STANDING):  acetylcysteine 10%  Inhalation 4 milliLiter(s) Inhalation every 8 hours  albuterol    0.083% 1.25 milliGRAM(s) Nebulizer every 6 hours  apixaban 5 milliGRAM(s) Oral two times a day  atorvastatin 20 milliGRAM(s) Oral at bedtime  buDESOnide    Inhalation Suspension 0.5 milliGRAM(s) Inhalation two times a day  collagenase Ointment 1 Application(s) Topical daily  dextrose 5%. 1000 milliLiter(s) (50 mL/Hr) IV Continuous <Continuous>  dextrose 5%. 1000 milliLiter(s) (100 mL/Hr) IV Continuous <Continuous>  dextrose 50% Injectable 25 Gram(s) IV Push once  dextrose 50% Injectable 12.5 Gram(s) IV Push once  dextrose 50% Injectable 25 Gram(s) IV Push once  glucagon  Injectable 1 milliGRAM(s) IntraMuscular once  insulin glargine Injectable (LANTUS) 34 Unit(s) SubCutaneous at bedtime  insulin lispro (ADMELOG) corrective regimen sliding scale   SubCutaneous three times a day before meals  insulin lispro Injectable (ADMELOG) 3 Unit(s) SubCutaneous three times a day before meals  magnesium oxide 400 milliGRAM(s) Oral every 8 hours  meropenem  IVPB 1000 milliGRAM(s) IV Intermittent every 8 hours  methylPREDNISolone 8 milliGRAM(s) Oral daily  mexiletine 200 milliGRAM(s) Oral every 8 hours  pantoprazole    Tablet 40 milliGRAM(s) Oral before breakfast  polyethylene glycol 3350 17 Gram(s) Oral two times a day  tamsulosin 0.4 milliGRAM(s) Oral at bedtime  tiotropium 2.5 MICROgram(s) Inhaler 2 Puff(s) Inhalation daily  trimethoprim   80 mG/sulfamethoxazole 400 mG 1 Tablet(s) Oral daily    Vital Signs Last 24 Hrs  T(C): 36.3 (09 Mar 2023 07:59), Max: 36.7 (08 Mar 2023 16:23)  T(F): 97.3 (09 Mar 2023 07:59), Max: 98.1 (08 Mar 2023 16:23)  HR: 78 (09 Mar 2023 08:43) (78 - 99)  BP: 113/60 (09 Mar 2023 07:59) (107/62 - 115/58)  BP(mean): --  RR: 18 (09 Mar 2023 07:59) (17 - 18)  SpO2: 97% (09 Mar 2023 08:43) (92% - 100%)    Parameters below as of 09 Mar 2023 08:43  Patient On (Oxygen Delivery Method): room air     Physical exam:    Constitutional:  No acute distress  HEENT: NC/AT, EOMI, PERRLA, conjunctivae clear; ears and nose atraumatic  Neck: supple; thyroid not palpable  Back: no tenderness  Respiratory: respiratory effort normal; few crackles at bases  Cardiovascular: S1S2 regular, no murmurs  Abdomen: soft, not tender, not distended, positive BS  Genitourinary: no suprapubic tenderness  Lymphatic: no LN palpable  Musculoskeletal: no muscle tenderness, no joint swelling or tenderness  Extremities: no pedal edema  Neurological/ Psychiatric: AxOx3, moving all extremities  Skin: no rashes; no palpable lesions    Labs: reviewed                        10.7   8.52  )-----------( 232      ( 08 Mar 2023 13:27 )             36.3     03-08    136  |  106  |  18  ----------------------------<  242<H>  4.2   |  26  |  0.56    Ca    8.9      08 Mar 2023 13:27    TPro  6.6  /  Alb  2.7<L>  /  TBili  0.2  /  DBili  x   /  AST  12<L>  /  ALT  15  /  AlkPhos  110  03-08    Vancomycin Level, Trough: 13.6 ug/mL (03-06 @ 21:39)                        10.8   6.27  )-----------( 239      ( 05 Mar 2023 06:35 )             36.7     03-05    135  |  106  |  15  ----------------------------<  328<H>  4.4   |  25  |  0.68    Ca    9.1      05 Mar 2023 06:35  Mg     2.0     03-04    TPro  6.7  /  Alb  2.7<L>  /  TBili  0.2  /  DBili  x   /  AST  16  /  ALT  11<L>  /  AlkPhos  106  03-05     LIVER FUNCTIONS - ( 05 Mar 2023 06:35 )  Alb: 2.7 g/dL / Pro: 6.7 gm/dL / ALK PHOS: 106 U/L / ALT: 11 U/L / AST: 16 U/L / GGT: x             Culture - Sputum (collected 05 Mar 2023 14:20)  Source: .Sputum Sputum  Gram Stain (05 Mar 2023 20:44):    Few Squamous epithelial cells per low power field    Few polymorphonuclear leukocytes per low power field    No organisms seen per oil power field  Final Report (07 Mar 2023 21:16):    Normal Respiratory Jessica present    Culture - Blood (collected 04 Mar 2023 11:47)  Source: .Blood Blood  Preliminary Report (05 Mar 2023 18:02):    No growth to date.    Radiology: all available radiological tests reviewed    < from: CT Chest No Cont (03.04.23 @ 13:53) >  In comparison with 12/14/2022, interval decrease of bilateral mucoid   impactions, tree-in-bud opacities and consolidative opacity within left   lower lobe. No new opacity.  < end of copied text >    < from: Xray Chest 1 View- PORTABLE-Urgent (03.04.23 @ 11:32) >  IMPRESSION: Patchy infiltrate at the right base for which follow-up   radiographs aresuggested.  < end of copied text >    Advanced directives addressed: full resuscitation

## 2023-03-09 NOTE — PROGRESS NOTE ADULT - ASSESSMENT
71 y/o female with h/o DM type 2, CAD, A.fib, asthma on chronic steroids, colon cancer s/p resection, aortic dissection repair 9/22 was admitted on 3/4 for worsening sob and cough. Patient reports she was being treated for pneumonia at [facility] with Invanz for ESBL proteus however she remained febrile for the past week despite being on this antibiotic. She was sent into the ED for further evaluation. In ER she was noted with low grade fever to 100.1F and received meropenem, vancomycin IV, fluconazole IV and bactrim PO.    1. Low grade fever. Possible sepsis. Right base pneumonia. Possible aspiration pneumonia. Allergy to PCN, cefepime (anaphylaxis).  -mucoid impaction  -reported febrile despite IV abx therapy with ertapenem ?cause ?MDRO ?drug fever  -BC x 2, sputum c/s noted  -abx choice is limited to multiple abx allergies  -s/p vancomycin 750 mg IV q12h # 4  -on meropenem 1 gm IV q8h # 5  -tolerating abx well so far; no side effects noted  -monitor closely in donnie of PCN allergy history  -pulmonary evaluation appreciated  -respiratory care  -complete abx therapy today  -f/u cultures  -monitor temps  -f/u CBC  -supportive care  2. Other issues:   -care per medicine

## 2023-03-09 NOTE — DISCHARGE NOTE NURSING/CASE MANAGEMENT/SOCIAL WORK - PATIENT PORTAL LINK FT
You can access the FollowMyHealth Patient Portal offered by [facility] by registering at the following website: [email or web address]. By joining [facility]’s FollowMyHealth portal, you will also be able to view your health information using other applications (apps) compatible with our system.

## 2023-03-09 NOTE — DISCHARGE NOTE PROVIDER - NSDCMRMEDTOKEN_GEN_ALL_CORE_FT
acetaminophen 500 mg oral tablet: 1 tab(s) orally every 6 hours, As Needed  acetylcysteine 10% inhalation solution: 2 milliliter(s) inhaled every 8 hours  albuterol 2.5 mg/3 mL (0.083%) inhalation solution: 1 inhaler(s) inhaled every 6 hours, As Needed for bronchospasms  apixaban 5 mg oral tablet: 1 tab(s) orally 2 times a day  budesonide 0.5 mg/2 mL inhalation suspension: 2 milliliter(s) inhaled 2 times a day  collagenase 250 units/g topical ointment: 1 application topically 2 times a day  Crestor 5 mg oral tablet: 1 tab(s) orally once a day (at bedtime)  Incruse Ellipta 62.5 mcg/inh inhalation powder: 1 puff(s) inhaled every 24 hours  insulin glargine 100 units/mL subcutaneous solution: 25 unit(s) subcutaneous once a day (at bedtime)  insulin lispro 100 units/mL injectable solution: 2 unit(s) subcutaneous 3 times a day (before meals)  ipratropium-albuterol 0.5 mg-2.5 mg/3 mL inhalation solution: 3 milliliter(s) inhaled every 6 hours  magnesium oxide 400 mg oral tablet: 1 tab(s) orally every 8 hours  Medrol 8 mg oral tablet: 1 tab(s) orally once a day  mexiletine 200 mg oral capsule: 1 cap(s) orally every 8 hours  MiraLax oral powder for reconstitution: 17 gram(s) orally 2 times a day  omeprazole 20 mg oral delayed release tablet: 1 tab(s) orally once a day  ondansetron 4 mg oral tablet: 1 tab(s) orally every 6 hours, As Needed  saccharomyces boulardii lyo 250 mg oral capsule: 1 cap(s) orally 2 times a day  sulfamethoxazole-trimethoprim 400 mg-80 mg oral tablet: 1 tab(s) orally once a day  tamsulosin 0.4 mg oral capsule: 1 cap(s) orally once a day (at bedtime)  traMADol 50 mg oral tablet: 1 tab(s) orally every 8 hours, As Needed

## 2023-03-09 NOTE — DISCHARGE NOTE PROVIDER - HOSPITAL COURSE
Patient is a 72 year old female with history of DM, CAD, Afib, Asthma on chronic steroids, colon cancer sp resection and recent aortic dissection repair 9/22 presents with worsening sob and cough. Patient reports she was being treated for pneumonia at [facility] with Invanz for ESBL proteus however she remained febrile for the past week being on this antibiotic. She was sent into the ED for further evaluation.     Patient seen and eval. Doing well. No need for O2. Denies any HA, CP, SOB. No fevers, chills or shakes. Patient with increased cough /  sputum production /  ronchorus.   PT consulted /  RR consulted /  CM eval initiated for placement in [facility].    Physical Exam:   GENERAL APPEARANCE:  NAD, deconditioned and frail  T(C): 36.3 (03-09-23 @ 07:59), Max: 36.7 (03-08-23 @ 16:23)  HR: 78 (03-09-23 @ 08:43) (78 - 99)  BP: 113/60 (03-09-23 @ 07:59) (113/60 - 115/58)  RR: 18 (03-09-23 @ 07:59) (17 - 18)  SpO2: 97% (03-09-23 @ 08:43) (92% - 100%)  HEENT:  atraumatic  Skin:  thin and dry  NECK:  Supple without lymphadenopathy.   HEART:  Regular rate and rhythm. normal S1 and S2, No M/R/G  LUNGS:  ronchi  ABDOMEN:  Soft, nontender, nondistended with good bowel sounds heard  EXTREMITIES:  Without cyanosis, clubbing or edema.   NEUROLOGICAL:  Gross nonfocal     Patient is a 72 year old female with history of DM, CAD, Afib (AC), Asthma (on chronic steroids), colon cancer (sp resection) and recent aortic dissection repair 9/22 presents with worsening sob and cough. Patient reports she was being treated for pneumonia at [facility] with Invanz for ESBL proteus however she remained febrile for the past week being on this antibiotic. She was sent into the ED for further evaluation.     Patient seen and eval. Doing well. No need for O2. Denies any HA, CP, SOB. No fevers, chills or shakes. Patient with increased cough /  sputum production /  ronchorus.   PT consulted /  RR consulted /  CM eval initiated for placement in [facility].    Physical Exam:   GENERAL APPEARANCE:  NAD, deconditioned and frail  T(C): 36.3 (03-09-23 @ 07:59), Max: 36.7 (03-08-23 @ 16:23)  HR: 78 (03-09-23 @ 08:43) (78 - 99)  BP: 113/60 (03-09-23 @ 07:59) (113/60 - 115/58)  RR: 18 (03-09-23 @ 07:59) (17 - 18)  SpO2: 97% (03-09-23 @ 08:43) (92% - 100%)  HEENT:  atraumatic  Skin:  thin and dry  NECK:  Supple without lymphadenopathy.   HEART:  Regular rate and rhythm. normal S1 and S2, No M/R/G  LUNGS:  rhonchi / crackles  ABDOMEN:  Soft, nontender, nondistended with good bowel sounds heard  EXTREMITIES:  Without cyanosis, clubbing or edema.   NEUROLOGICAL:  Gross nonfocal

## 2023-03-10 ENCOUNTER — NON-APPOINTMENT (OUTPATIENT)
Age: 73
End: 2023-03-10

## 2023-03-10 ENCOUNTER — TRANSCRIPTION ENCOUNTER (OUTPATIENT)
Age: 73
End: 2023-03-10

## 2023-03-14 DIAGNOSIS — J45.50 SEVERE PERSISTENT ASTHMA, UNCOMPLICATED: ICD-10-CM

## 2023-03-14 DIAGNOSIS — J44.9 CHRONIC OBSTRUCTIVE PULMONARY DISEASE, UNSPECIFIED: ICD-10-CM

## 2023-03-14 DIAGNOSIS — I48.20 CHRONIC ATRIAL FIBRILLATION, UNSPECIFIED: ICD-10-CM

## 2023-03-14 DIAGNOSIS — Z79.01 LONG TERM (CURRENT) USE OF ANTICOAGULANTS: ICD-10-CM

## 2023-03-14 DIAGNOSIS — Z91.013 ALLERGY TO SEAFOOD: ICD-10-CM

## 2023-03-14 DIAGNOSIS — Z85.038 PERSONAL HISTORY OF OTHER MALIGNANT NEOPLASM OF LARGE INTESTINE: ICD-10-CM

## 2023-03-14 DIAGNOSIS — J96.20 ACUTE AND CHRONIC RESPIRATORY FAILURE, UNSPECIFIED WHETHER WITH HYPOXIA OR HYPERCAPNIA: ICD-10-CM

## 2023-03-14 DIAGNOSIS — Z86.718 PERSONAL HISTORY OF OTHER VENOUS THROMBOSIS AND EMBOLISM: ICD-10-CM

## 2023-03-14 DIAGNOSIS — Z88.0 ALLERGY STATUS TO PENICILLIN: ICD-10-CM

## 2023-03-14 DIAGNOSIS — I25.10 ATHEROSCLEROTIC HEART DISEASE OF NATIVE CORONARY ARTERY WITHOUT ANGINA PECTORIS: ICD-10-CM

## 2023-03-14 DIAGNOSIS — I69.351 HEMIPLEGIA AND HEMIPARESIS FOLLOWING CEREBRAL INFARCTION AFFECTING RIGHT DOMINANT SIDE: ICD-10-CM

## 2023-03-14 DIAGNOSIS — Z99.81 DEPENDENCE ON SUPPLEMENTAL OXYGEN: ICD-10-CM

## 2023-03-14 DIAGNOSIS — E43 UNSPECIFIED SEVERE PROTEIN-CALORIE MALNUTRITION: ICD-10-CM

## 2023-03-14 DIAGNOSIS — N32.0 BLADDER-NECK OBSTRUCTION: ICD-10-CM

## 2023-03-14 DIAGNOSIS — Z88.6 ALLERGY STATUS TO ANALGESIC AGENT: ICD-10-CM

## 2023-03-14 DIAGNOSIS — E11.9 TYPE 2 DIABETES MELLITUS WITHOUT COMPLICATIONS: ICD-10-CM

## 2023-03-14 DIAGNOSIS — J15.6 PNEUMONIA DUE TO OTHER GRAM-NEGATIVE BACTERIA: ICD-10-CM

## 2023-03-14 DIAGNOSIS — Z79.4 LONG TERM (CURRENT) USE OF INSULIN: ICD-10-CM

## 2023-03-16 ENCOUNTER — APPOINTMENT (OUTPATIENT)
Dept: PULMONOLOGY | Facility: CLINIC | Age: 73
End: 2023-03-16
Payer: MEDICARE

## 2023-03-16 ENCOUNTER — NON-APPOINTMENT (OUTPATIENT)
Age: 73
End: 2023-03-16

## 2023-03-16 VITALS
HEIGHT: 67 IN | TEMPERATURE: 97.8 F | BODY MASS INDEX: 21.19 KG/M2 | RESPIRATION RATE: 16 BRPM | OXYGEN SATURATION: 98 % | WEIGHT: 135 LBS | SYSTOLIC BLOOD PRESSURE: 140 MMHG | HEART RATE: 91 BPM | DIASTOLIC BLOOD PRESSURE: 80 MMHG

## 2023-03-16 PROCEDURE — ZZZZZ: CPT

## 2023-03-16 PROCEDURE — 99214 OFFICE O/P EST MOD 30 MIN: CPT | Mod: 25

## 2023-03-16 PROCEDURE — 94010 BREATHING CAPACITY TEST: CPT | Mod: 26

## 2023-03-16 PROCEDURE — 96372 THER/PROPH/DIAG INJ SC/IM: CPT

## 2023-03-16 RX ORDER — TEZEPELUMAB-EKKO 210 MG/1.9ML
210 INJECTION, SOLUTION SUBCUTANEOUS
Qty: 0 | Refills: 0 | Status: COMPLETED | OUTPATIENT
Start: 2023-03-15

## 2023-03-16 NOTE — REASON FOR VISIT
[Follow-Up - From Hospitalization] : a follow-up visit after a recent hospitalization [Asthma] : asthma [Family Member] : family member

## 2023-03-20 NOTE — DISCUSSION/SUMMARY
[FreeTextEntry1] : 3/4/23 CHEST CT - compared with 12/14/22. \par AIRWAYS, LUNGS and PLEURA: \par - Patent central airways. \par - Mild paraseptal  emphysema. \par - In comparison with 12/14/2022, interval decrease of bilateral  mucoid impactions, tree-in-bud opacities and consolidative opacity within  left lower lobe. \par - No new opacity. \par - No pleural effusion.

## 2023-03-20 NOTE — PROCEDURE
[FreeTextEntry1] : spi performed in office show \par FEV1: 30% \par FEV1/FVC Ratio: 54% \par MSH75-69%: 13% \par \par 6/10/2022 PFT's performed in office show \par FEV1: 46%\par FEV1/FVC Ratio: 48%\par HOD61-98%: 22%\par DLCO: 63%\par \par \par \par

## 2023-03-20 NOTE — REVIEW OF SYSTEMS
[Cough] : cough [SOB on Exertion] : sob on exertion [Negative] : Endocrine [Chest Tightness] : no chest tightness [Sputum] : no sputum [Dyspnea] : no dyspnea [Wheezing] : no wheezing

## 2023-03-20 NOTE — ASSESSMENT
[FreeTextEntry1] : Ms. Bloom is a 72 year old female with a history of abnormal CXR/chest CT, allergic rhinitis, severe persistent asthma, bronchiectasis, GERD, COPD, recurrent PNA, PND, s/p tracheoplasty for TBM, Covid-19 infection 11/2022, and SOB presenting to the office today for a hospital follow up visit. She is accompanied by her daughter. \par \par 1. Severe persistent asthma -(steroid dependent):\par - continue Medrol 8mg/day \par - continue DuoNeb via nebulizer, Q6H (and discontinue use of Albuterol only nebulizer - both were listed on current medication regimen provided from [facility] facility - informed patient she should not be double dosed). \par - continue Budesonide 0.5% via the nebulizer BID \par - continue to use Incruse 1 inhale QAM (or equivalent - this is currently provided by [facility]). \par - Add Breo Ellipta 200 mcg at 1 inhalation QD - rinse and gargle post use (patient not currently on ICS/LABA per med regimen sheet provided from [facility]). \par - Patient received Tezspire Sub-Q in the RLQ; next dose in 4 weeks. \par \par 2. chronic bronchitis and mucus impaction:\par - Continue Mucomyst QiD 2cc 10% q8h\par - continue to use acapella device TID. \par - continue to use chest vest therapy BID-TID.\par - Improved based on recent inpatient Chest CT. \par \par 3. GERD\par -continue to use Protonix 40 mg before breakfast\par -continue Baclofen 10 mg q-meal\par \par Patient to follow up in 1 month with RN for next Tezspire and with Dr. Allison as scheduled for 5/31/23.\par Patient to call with further questions and concerns.\par Patient verbalizes understanding of care plan and is agreeable.\par \par \par

## 2023-03-20 NOTE — PHYSICAL EXAM
[No Acute Distress] : no acute distress [Normal Appearance] : normal appearance [No Neck Mass] : no neck mass [Normal Rate/Rhythm] : normal rate/rhythm [Normal S1, S2] : normal s1, s2 [No Murmurs] : no murmurs [No Resp Distress] : no resp distress [No Abnormalities] : no abnormalities [No Clubbing] : no clubbing [No Cyanosis] : no cyanosis [No Edema] : no edema [FROM] : FROM [Normal Color/ Pigmentation] : normal color/ pigmentation [No Focal Deficits] : no focal deficits [Oriented x3] : oriented x3 [Normal Affect] : normal affect [Rhonchi] : rhonchi [No Deformities] : no deformities [TextBox_99] : Wheelchair.

## 2023-03-27 ENCOUNTER — TRANSCRIPTION ENCOUNTER (OUTPATIENT)
Age: 73
End: 2023-03-27

## 2023-03-28 NOTE — PHYSICAL EXAM
Pt ambulatory to the restroom without difficulty.       Kelsi Ly RN  03/28/23 [id number] [FreeTextEntry1] : On examination she is alert answering appropriately without aphasia or dysarthria cranial nerves with full extraocular movements fundi are benign no facial asymmetry to be noted and color temperature or skin cShe has reduced sensation distally in her lower extremities. No color temperature or skin changes noted no bruits are detectable. Pulses appear to be intact upper extremities and cranial nerves are normal.hanges could be noted no bruits can be detectable

## 2023-03-30 ENCOUNTER — APPOINTMENT (OUTPATIENT)
Dept: CT IMAGING | Facility: CLINIC | Age: 73
End: 2023-03-30

## 2023-04-05 ENCOUNTER — OUTPATIENT (OUTPATIENT)
Dept: OUTPATIENT SERVICES | Facility: HOSPITAL | Age: 73
LOS: 1 days | End: 2023-04-05
Payer: MEDICARE

## 2023-04-05 ENCOUNTER — RESULT REVIEW (OUTPATIENT)
Age: 73
End: 2023-04-05

## 2023-04-05 DIAGNOSIS — K62.5 HEMORRHAGE OF ANUS AND RECTUM: Chronic | ICD-10-CM

## 2023-04-05 DIAGNOSIS — Z98.890 OTHER SPECIFIED POSTPROCEDURAL STATES: Chronic | ICD-10-CM

## 2023-04-05 DIAGNOSIS — Z98.890 OTHER SPECIFIED POSTPROCEDURAL STATES: ICD-10-CM

## 2023-04-05 DIAGNOSIS — Z96.653 PRESENCE OF ARTIFICIAL KNEE JOINT, BILATERAL: Chronic | ICD-10-CM

## 2023-04-05 DIAGNOSIS — H05.352 EXOSTOSIS OF LEFT ORBIT: Chronic | ICD-10-CM

## 2023-04-05 DIAGNOSIS — Z98.89 OTHER SPECIFIED POSTPROCEDURAL STATES: Chronic | ICD-10-CM

## 2023-04-05 DIAGNOSIS — Z87.09 PERSONAL HISTORY OF OTHER DISEASES OF THE RESPIRATORY SYSTEM: Chronic | ICD-10-CM

## 2023-04-05 PROCEDURE — 74174 CTA ABD&PLVS W/CONTRAST: CPT | Mod: 26,MH

## 2023-04-05 PROCEDURE — 71275 CT ANGIOGRAPHY CHEST: CPT | Mod: MH

## 2023-04-05 PROCEDURE — 71275 CT ANGIOGRAPHY CHEST: CPT | Mod: 26,MH

## 2023-04-05 PROCEDURE — 74174 CTA ABD&PLVS W/CONTRAST: CPT | Mod: MH

## 2023-04-06 DIAGNOSIS — Z98.890 OTHER SPECIFIED POSTPROCEDURAL STATES: ICD-10-CM

## 2023-04-13 ENCOUNTER — APPOINTMENT (OUTPATIENT)
Dept: PULMONOLOGY | Facility: CLINIC | Age: 73
End: 2023-04-13
Payer: MEDICARE

## 2023-04-13 VITALS
RESPIRATION RATE: 16 BRPM | HEART RATE: 84 BPM | HEIGHT: 67 IN | SYSTOLIC BLOOD PRESSURE: 110 MMHG | TEMPERATURE: 98 F | DIASTOLIC BLOOD PRESSURE: 80 MMHG | OXYGEN SATURATION: 94 %

## 2023-04-13 PROCEDURE — 96372 THER/PROPH/DIAG INJ SC/IM: CPT

## 2023-04-13 RX ORDER — TEZEPELUMAB-EKKO 210 MG/1.9ML
210 INJECTION, SOLUTION SUBCUTANEOUS
Qty: 0 | Refills: 0 | Status: COMPLETED | OUTPATIENT
Start: 2023-04-12

## 2023-04-17 NOTE — PROGRESS NOTE ADULT - PROBLEM SELECTOR PROBLEM 10
no
Prophylactic measure

## 2023-05-02 RX ORDER — MEROPENEM 1 G/30ML
500 INJECTION INTRAVENOUS
Refills: 0 | DISCHARGE
Start: 2023-05-02 | End: 2023-05-06

## 2023-05-02 NOTE — PROGRESS NOTE ADULT - SUBJECTIVE AND OBJECTIVE BOX
CHIEF COMPLAINT: Chest pain    Interval Events: No acute events. Feels ok. No chest pain, dyspnea, fevers, chills, nausea, emesis, or diarrhea.     REVIEW OF SYSTEMS:  See above. ROS otherwise negative.    OBJECTIVE:  ICU Vital Signs Last 24 Hrs  T(C): 36.1 (20 Oct 2022 12:00), Max: 37.2 (19 Oct 2022 16:00)  T(F): 97 (20 Oct 2022 12:00), Max: 98.9 (19 Oct 2022 16:00)  HR: 84 (20 Oct 2022 12:00) (74 - 98)  BP: 111/56 (19 Oct 2022 20:00) (111/56 - 111/56)  BP(mean): 80 (19 Oct 2022 20:00) (80 - 80)  ABP: 129/59 (20 Oct 2022 12:00) (92/54 - 156/69)  ABP(mean): 80 (20 Oct 2022 12:00) (69 - 98)  RR: 32 (20 Oct 2022 12:00) (18 - 32)  SpO2: 99% (20 Oct 2022 12:00) (95% - 100%)    O2 Parameters below as of 20 Oct 2022 12:00  Patient On (Oxygen Delivery Method): tracheostomy collar  O2 Flow (L/min): 10  O2 Concentration (%): 40      Mode: vent off, FiO2: 40    10-19 @ 07:01  -  10-20 @ 07:00  --------------------------------------------------------  IN: 3267.5 mL / OUT: 1850 mL / NET: 1417.5 mL    10-20 @ 07:01  -  10-20 @ 12:59  --------------------------------------------------------  IN: 422.5 mL / OUT: 0 mL / NET: 422.5 mL      CAPILLARY BLOOD GLUCOSE      POCT Blood Glucose.: 146 mg/dL (20 Oct 2022 11:31)      PHYSICAL EXAM:  General: Adult female sitting comfortably in chair, NAD  HEENT: NC/AT sclerae anicteric.   Neck: Trach in place, trach collar on  Respiratory: No increased WOB. CTAB  Cardiovascular: S1, S2  Abdomen: Soft, + BS  Extremities: WWP  Skin: Incision dressed  Neurological: Awake, alert, follows commands  Psychiatry: Appropriate affect    HOSPITAL MEDICATIONS:  MEDICATIONS  (STANDING):  acetylcysteine 10%  Inhalation 4 milliLiter(s) Inhalation every 12 hours  albuterol/ipratropium for Nebulization 3 milliLiter(s) Nebulizer every 6 hours  buDESOnide    Inhalation Suspension 0.5 milliGRAM(s) Inhalation every 12 hours  chlorhexidine 0.12% Liquid 15 milliLiter(s) Oral Mucosa every 12 hours  chlorhexidine 2% Cloths 1 Application(s) Topical <User Schedule>  collagenase Ointment 1 Application(s) Topical daily  dextrose 50% Injectable 50 milliLiter(s) IV Push every 15 minutes  dorzolamide 2% Ophthalmic Solution 1 Drop(s) Left EYE three times a day  fluconAZOLE IVPB 600 milliGRAM(s) IV Intermittent every 24 hours  furosemide   Injectable 40 milliGRAM(s) IV Push two times a day  heparin  Infusion 1050 Unit(s)/Hr (9.5 mL/Hr) IV Continuous <Continuous>  hydrALAZINE 37.5 milliGRAM(s) Oral every 8 hours  insulin lispro (ADMELOG) corrective regimen sliding scale   SubCutaneous every 6 hours  insulin NPH human recombinant 20 Unit(s) SubCutaneous every 6 hours  magnesium oxide 400 milliGRAM(s) Oral every 8 hours  methylPREDNISolone 8 milliGRAM(s) Oral daily  metoprolol tartrate 12.5 milliGRAM(s) Enteral Tube every 12 hours  mexiletine 200 milliGRAM(s) Oral every 8 hours  multivitamin 1 Tablet(s) Oral daily  pantoprazole  Injectable 40 milliGRAM(s) IV Push daily  sodium chloride 0.9%. 1000 milliLiter(s) (10 mL/Hr) IV Continuous <Continuous>  timolol 0.5% Solution 1 Drop(s) Left EYE two times a day  vancomycin  IVPB 750 milliGRAM(s) IV Intermittent every 12 hours  vancomycin  IVPB        MEDICATIONS  (PRN):  acetaminophen    Suspension .. 650 milliGRAM(s) Oral every 6 hours PRN Temp greater or equal to 38C (100.4F), Mild Pain (1 - 3)      LABS:                        10.4   14.51 )-----------( 362      ( 20 Oct 2022 00:23 )             35.2     Hgb Trend: 10.4<--, 10.3<--, 9.1<--, 9.7<--, 10.1<--  10-20    138  |  97  |  19  ----------------------------<  179<H>  4.5   |  29  |  0.41<L>    Ca    10.0      20 Oct 2022 00:22  Phos  3.4     10-20  Mg     1.8     10-20    TPro  7.9  /  Alb  3.4  /  TBili  0.3  /  DBili  x   /  AST  25  /  ALT  24  /  AlkPhos  166<H>  10-20    Creatinine Trend: 0.41<--, 0.43<--, 0.40<--, 0.45<--, 0.46<--, 0.41<--  PT/INR - ( 19 Oct 2022 00:38 )   PT: 12.2 sec;   INR: 1.06 ratio         PTT - ( 20 Oct 2022 00:23 )  PTT:58.1 sec    Arterial Blood Gas:  10-20 @ 11:31  7.50/48/107/37/98.7/12.6  ABG lactate: --  Arterial Blood Gas:  10-20 @ 00:00  7.45/51/133/35/99.5/10.0  ABG lactate: --  Arterial Blood Gas:  10-19 @ 12:00  7.46/47/123/33/99.3/8.5  ABG lactate: --  Arterial Blood Gas:  10-19 @ 00:05  7.46/47/115/33/97.8/8.5  ABG lactate: --        MICROBIOLOGY:       RADIOLOGY:  [ ] Reviewed and interpreted by me    PULMONARY FUNCTION TESTS:    EKG: 01-Jun-2021 no

## 2023-05-04 ENCOUNTER — INPATIENT (INPATIENT)
Facility: HOSPITAL | Age: 73
LOS: 17 days | Discharge: HOME | DRG: 871 | End: 2023-05-22
Attending: INTERNAL MEDICINE | Admitting: HOSPITALIST
Payer: MEDICARE

## 2023-05-04 VITALS
DIASTOLIC BLOOD PRESSURE: 78 MMHG | HEIGHT: 67 IN | WEIGHT: 130.95 LBS | SYSTOLIC BLOOD PRESSURE: 146 MMHG | HEART RATE: 101 BPM | OXYGEN SATURATION: 98 % | RESPIRATION RATE: 16 BRPM | TEMPERATURE: 101 F

## 2023-05-04 DIAGNOSIS — Z98.890 OTHER SPECIFIED POSTPROCEDURAL STATES: Chronic | ICD-10-CM

## 2023-05-04 DIAGNOSIS — Z87.09 PERSONAL HISTORY OF OTHER DISEASES OF THE RESPIRATORY SYSTEM: Chronic | ICD-10-CM

## 2023-05-04 DIAGNOSIS — K62.5 HEMORRHAGE OF ANUS AND RECTUM: Chronic | ICD-10-CM

## 2023-05-04 DIAGNOSIS — Z96.653 PRESENCE OF ARTIFICIAL KNEE JOINT, BILATERAL: Chronic | ICD-10-CM

## 2023-05-04 DIAGNOSIS — Z98.89 OTHER SPECIFIED POSTPROCEDURAL STATES: Chronic | ICD-10-CM

## 2023-05-04 DIAGNOSIS — H05.352 EXOSTOSIS OF LEFT ORBIT: Chronic | ICD-10-CM

## 2023-05-04 DIAGNOSIS — J18.9 PNEUMONIA, UNSPECIFIED ORGANISM: ICD-10-CM

## 2023-05-04 LAB
ADD ON TEST-SPECIMEN IN LAB: SIGNIFICANT CHANGE UP
ALBUMIN SERPL ELPH-MCNC: 3.2 G/DL — LOW (ref 3.3–5)
ALP SERPL-CCNC: 104 U/L — SIGNIFICANT CHANGE UP (ref 40–120)
ALT FLD-CCNC: 21 U/L — SIGNIFICANT CHANGE UP (ref 12–78)
ANION GAP SERPL CALC-SCNC: 6 MMOL/L — SIGNIFICANT CHANGE UP (ref 5–17)
ANISOCYTOSIS BLD QL: SIGNIFICANT CHANGE UP
APPEARANCE UR: CLEAR — SIGNIFICANT CHANGE UP
AST SERPL-CCNC: 27 U/L — SIGNIFICANT CHANGE UP (ref 15–37)
BACTERIA # UR AUTO: ABNORMAL
BASOPHILS # BLD AUTO: 0.02 K/UL — SIGNIFICANT CHANGE UP (ref 0–0.2)
BASOPHILS NFR BLD AUTO: 0.2 % — SIGNIFICANT CHANGE UP (ref 0–2)
BILIRUB SERPL-MCNC: 0.3 MG/DL — SIGNIFICANT CHANGE UP (ref 0.2–1.2)
BILIRUB UR-MCNC: NEGATIVE — SIGNIFICANT CHANGE UP
BLD GP AB SCN SERPL QL: SIGNIFICANT CHANGE UP
BUN SERPL-MCNC: 12 MG/DL — SIGNIFICANT CHANGE UP (ref 7–23)
CALCIUM SERPL-MCNC: 8.9 MG/DL — SIGNIFICANT CHANGE UP (ref 8.5–10.1)
CHLORIDE SERPL-SCNC: 100 MMOL/L — SIGNIFICANT CHANGE UP (ref 96–108)
CO2 SERPL-SCNC: 30 MMOL/L — SIGNIFICANT CHANGE UP (ref 22–31)
COLOR SPEC: YELLOW — SIGNIFICANT CHANGE UP
COMMENT - URINE: SIGNIFICANT CHANGE UP
CREAT SERPL-MCNC: 0.65 MG/DL — SIGNIFICANT CHANGE UP (ref 0.5–1.3)
DIFF PNL FLD: ABNORMAL
EGFR: 92 ML/MIN/1.73M2 — SIGNIFICANT CHANGE UP
EOSINOPHIL # BLD AUTO: 0.08 K/UL — SIGNIFICANT CHANGE UP (ref 0–0.5)
EOSINOPHIL NFR BLD AUTO: 0.8 % — SIGNIFICANT CHANGE UP (ref 0–6)
EPI CELLS # UR: SIGNIFICANT CHANGE UP
FLUAV AG NPH QL: SIGNIFICANT CHANGE UP
FLUBV AG NPH QL: SIGNIFICANT CHANGE UP
GLUCOSE BLDC GLUCOMTR-MCNC: 145 MG/DL — HIGH (ref 70–99)
GLUCOSE BLDC GLUCOMTR-MCNC: 95 MG/DL — SIGNIFICANT CHANGE UP (ref 70–99)
GLUCOSE SERPL-MCNC: 135 MG/DL — HIGH (ref 70–99)
GLUCOSE UR QL: NEGATIVE — SIGNIFICANT CHANGE UP
HCT VFR BLD CALC: 45.7 % — HIGH (ref 34.5–45)
HGB BLD-MCNC: 13.3 G/DL — SIGNIFICANT CHANGE UP (ref 11.5–15.5)
HPIV3 RNA SPEC QL NAA+PROBE: DETECTED
IMM GRANULOCYTES NFR BLD AUTO: 0.7 % — SIGNIFICANT CHANGE UP (ref 0–0.9)
KETONES UR-MCNC: ABNORMAL
LACTATE SERPL-SCNC: 1.1 MMOL/L — SIGNIFICANT CHANGE UP (ref 0.7–2)
LEUKOCYTE ESTERASE UR-ACNC: NEGATIVE — SIGNIFICANT CHANGE UP
LIDOCAIN IGE QN: 91 U/L — SIGNIFICANT CHANGE UP (ref 73–393)
LYMPHOCYTES # BLD AUTO: 1.57 K/UL — SIGNIFICANT CHANGE UP (ref 1–3.3)
LYMPHOCYTES # BLD AUTO: 15.4 % — SIGNIFICANT CHANGE UP (ref 13–44)
MANUAL SMEAR VERIFICATION: SIGNIFICANT CHANGE UP
MCHC RBC-ENTMCNC: 21.4 PG — LOW (ref 27–34)
MCHC RBC-ENTMCNC: 29.1 GM/DL — LOW (ref 32–36)
MCV RBC AUTO: 73.5 FL — LOW (ref 80–100)
MICROCYTES BLD QL: SIGNIFICANT CHANGE UP
MONOCYTES # BLD AUTO: 0.93 K/UL — HIGH (ref 0–0.9)
MONOCYTES NFR BLD AUTO: 9.1 % — SIGNIFICANT CHANGE UP (ref 2–14)
NEUTROPHILS # BLD AUTO: 7.53 K/UL — HIGH (ref 1.8–7.4)
NEUTROPHILS NFR BLD AUTO: 73.8 % — SIGNIFICANT CHANGE UP (ref 43–77)
NITRITE UR-MCNC: NEGATIVE — SIGNIFICANT CHANGE UP
PH UR: 7 — SIGNIFICANT CHANGE UP (ref 5–8)
PLAT MORPH BLD: NORMAL — SIGNIFICANT CHANGE UP
PLATELET # BLD AUTO: 193 K/UL — SIGNIFICANT CHANGE UP (ref 150–400)
POIKILOCYTOSIS BLD QL AUTO: SLIGHT — SIGNIFICANT CHANGE UP
POLYCHROMASIA BLD QL SMEAR: SLIGHT — SIGNIFICANT CHANGE UP
POTASSIUM SERPL-MCNC: 3.7 MMOL/L — SIGNIFICANT CHANGE UP (ref 3.5–5.3)
POTASSIUM SERPL-SCNC: 3.7 MMOL/L — SIGNIFICANT CHANGE UP (ref 3.5–5.3)
PROT SERPL-MCNC: 7.6 GM/DL — SIGNIFICANT CHANGE UP (ref 6–8.3)
PROT UR-MCNC: NEGATIVE — SIGNIFICANT CHANGE UP
RAPID RVP RESULT: DETECTED
RBC # BLD: 6.22 M/UL — HIGH (ref 3.8–5.2)
RBC # FLD: 24 % — HIGH (ref 10.3–14.5)
RBC BLD AUTO: ABNORMAL
RBC CASTS # UR COMP ASSIST: ABNORMAL /HPF (ref 0–4)
RSV RNA NPH QL NAA+NON-PROBE: SIGNIFICANT CHANGE UP
SARS-COV-2 RNA SPEC QL NAA+PROBE: SIGNIFICANT CHANGE UP
SARS-COV-2 RNA SPEC QL NAA+PROBE: SIGNIFICANT CHANGE UP
SCHISTOCYTES BLD QL AUTO: SLIGHT — SIGNIFICANT CHANGE UP
SODIUM SERPL-SCNC: 136 MMOL/L — SIGNIFICANT CHANGE UP (ref 135–145)
SP GR SPEC: 1.01 — SIGNIFICANT CHANGE UP (ref 1.01–1.02)
TROPONIN I, HIGH SENSITIVITY RESULT: 24.44 NG/L — SIGNIFICANT CHANGE UP
UROBILINOGEN FLD QL: NEGATIVE — SIGNIFICANT CHANGE UP
WBC # BLD: 10.2 K/UL — SIGNIFICANT CHANGE UP (ref 3.8–10.5)
WBC # FLD AUTO: 10.2 K/UL — SIGNIFICANT CHANGE UP (ref 3.8–10.5)
WBC UR QL: SIGNIFICANT CHANGE UP /HPF (ref 0–5)

## 2023-05-04 PROCEDURE — 97116 GAIT TRAINING THERAPY: CPT | Mod: GP

## 2023-05-04 PROCEDURE — 12345: CPT | Mod: NC

## 2023-05-04 PROCEDURE — 83735 ASSAY OF MAGNESIUM: CPT

## 2023-05-04 PROCEDURE — 87070 CULTURE OTHR SPECIMN AEROBIC: CPT

## 2023-05-04 PROCEDURE — 99285 EMERGENCY DEPT VISIT HI MDM: CPT | Mod: CS

## 2023-05-04 PROCEDURE — 97530 THERAPEUTIC ACTIVITIES: CPT | Mod: GP

## 2023-05-04 PROCEDURE — 94640 AIRWAY INHALATION TREATMENT: CPT

## 2023-05-04 PROCEDURE — 87077 CULTURE AEROBIC IDENTIFY: CPT

## 2023-05-04 PROCEDURE — 99223 1ST HOSP IP/OBS HIGH 75: CPT

## 2023-05-04 PROCEDURE — 80053 COMPREHEN METABOLIC PANEL: CPT

## 2023-05-04 PROCEDURE — 85027 COMPLETE CBC AUTOMATED: CPT

## 2023-05-04 PROCEDURE — 87186 SC STD MICRODIL/AGAR DIL: CPT

## 2023-05-04 PROCEDURE — 94760 N-INVAS EAR/PLS OXIMETRY 1: CPT

## 2023-05-04 PROCEDURE — 71250 CT THORAX DX C-: CPT | Mod: MA

## 2023-05-04 PROCEDURE — 83036 HEMOGLOBIN GLYCOSYLATED A1C: CPT

## 2023-05-04 PROCEDURE — 82962 GLUCOSE BLOOD TEST: CPT

## 2023-05-04 PROCEDURE — 84100 ASSAY OF PHOSPHORUS: CPT

## 2023-05-04 PROCEDURE — 36415 COLL VENOUS BLD VENIPUNCTURE: CPT

## 2023-05-04 PROCEDURE — 71045 X-RAY EXAM CHEST 1 VIEW: CPT

## 2023-05-04 PROCEDURE — 80048 BASIC METABOLIC PNL TOTAL CA: CPT

## 2023-05-04 PROCEDURE — 71250 CT THORAX DX C-: CPT | Mod: 26,MA

## 2023-05-04 PROCEDURE — 71045 X-RAY EXAM CHEST 1 VIEW: CPT | Mod: 26

## 2023-05-04 RX ORDER — MAGNESIUM OXIDE 400 MG ORAL TABLET 241.3 MG
400 TABLET ORAL
Refills: 0 | Status: DISCONTINUED | OUTPATIENT
Start: 2023-05-04 | End: 2023-05-22

## 2023-05-04 RX ORDER — LANOLIN ALCOHOL/MO/W.PET/CERES
3 CREAM (GRAM) TOPICAL AT BEDTIME
Refills: 0 | Status: DISCONTINUED | OUTPATIENT
Start: 2023-05-04 | End: 2023-05-22

## 2023-05-04 RX ORDER — IPRATROPIUM/ALBUTEROL SULFATE 18-103MCG
3 AEROSOL WITH ADAPTER (GRAM) INHALATION EVERY 6 HOURS
Refills: 0 | Status: DISCONTINUED | OUTPATIENT
Start: 2023-05-04 | End: 2023-05-22

## 2023-05-04 RX ORDER — ACETAMINOPHEN 500 MG
1000 TABLET ORAL ONCE
Refills: 0 | Status: COMPLETED | OUTPATIENT
Start: 2023-05-04 | End: 2023-05-04

## 2023-05-04 RX ORDER — ONDANSETRON 8 MG/1
4 TABLET, FILM COATED ORAL ONCE
Refills: 0 | Status: COMPLETED | OUTPATIENT
Start: 2023-05-04 | End: 2023-05-04

## 2023-05-04 RX ORDER — INSULIN GLARGINE 100 [IU]/ML
25 INJECTION, SOLUTION SUBCUTANEOUS AT BEDTIME
Refills: 0 | Status: DISCONTINUED | OUTPATIENT
Start: 2023-05-04 | End: 2023-05-07

## 2023-05-04 RX ORDER — GUAIFENESIN/DEXTROMETHORPHAN 600MG-30MG
10 TABLET, EXTENDED RELEASE 12 HR ORAL EVERY 4 HOURS
Refills: 0 | Status: DISCONTINUED | OUTPATIENT
Start: 2023-05-04 | End: 2023-05-22

## 2023-05-04 RX ORDER — VANCOMYCIN HCL 1 G
1250 VIAL (EA) INTRAVENOUS EVERY 12 HOURS
Refills: 0 | Status: DISCONTINUED | OUTPATIENT
Start: 2023-05-04 | End: 2023-05-05

## 2023-05-04 RX ORDER — DEXTROSE 50 % IN WATER 50 %
25 SYRINGE (ML) INTRAVENOUS ONCE
Refills: 0 | Status: DISCONTINUED | OUTPATIENT
Start: 2023-05-04 | End: 2023-05-22

## 2023-05-04 RX ORDER — POLYETHYLENE GLYCOL 3350 17 G/17G
17 POWDER, FOR SOLUTION ORAL DAILY
Refills: 0 | Status: DISCONTINUED | OUTPATIENT
Start: 2023-05-04 | End: 2023-05-22

## 2023-05-04 RX ORDER — SODIUM CHLORIDE 9 MG/ML
1000 INJECTION, SOLUTION INTRAVENOUS
Refills: 0 | Status: DISCONTINUED | OUTPATIENT
Start: 2023-05-04 | End: 2023-05-22

## 2023-05-04 RX ORDER — SODIUM CHLORIDE 9 MG/ML
1000 INJECTION INTRAMUSCULAR; INTRAVENOUS; SUBCUTANEOUS
Refills: 0 | Status: DISCONTINUED | OUTPATIENT
Start: 2023-05-04 | End: 2023-05-07

## 2023-05-04 RX ORDER — GLUCAGON INJECTION, SOLUTION 0.5 MG/.1ML
1 INJECTION, SOLUTION SUBCUTANEOUS ONCE
Refills: 0 | Status: DISCONTINUED | OUTPATIENT
Start: 2023-05-04 | End: 2023-05-22

## 2023-05-04 RX ORDER — SODIUM CHLORIDE 9 MG/ML
1000 INJECTION INTRAMUSCULAR; INTRAVENOUS; SUBCUTANEOUS ONCE
Refills: 0 | Status: COMPLETED | OUTPATIENT
Start: 2023-05-04 | End: 2023-05-04

## 2023-05-04 RX ORDER — ACETYLCYSTEINE 200 MG/ML
2 VIAL (ML) MISCELLANEOUS
Qty: 0 | Refills: 0 | DISCHARGE

## 2023-05-04 RX ORDER — INSULIN LISPRO 100/ML
VIAL (ML) SUBCUTANEOUS AT BEDTIME
Refills: 0 | Status: DISCONTINUED | OUTPATIENT
Start: 2023-05-04 | End: 2023-05-22

## 2023-05-04 RX ORDER — NYSTATIN 500MM UNIT
500000 POWDER (EA) MISCELLANEOUS EVERY 8 HOURS
Refills: 0 | Status: DISCONTINUED | OUTPATIENT
Start: 2023-05-04 | End: 2023-05-05

## 2023-05-04 RX ORDER — ALBUTEROL 90 UG/1
1 AEROSOL, METERED ORAL
Refills: 0 | Status: DISCONTINUED | OUTPATIENT
Start: 2023-05-04 | End: 2023-05-16

## 2023-05-04 RX ORDER — MEXILETINE HYDROCHLORIDE 150 MG/1
200 CAPSULE ORAL EVERY 8 HOURS
Refills: 0 | Status: DISCONTINUED | OUTPATIENT
Start: 2023-05-04 | End: 2023-05-22

## 2023-05-04 RX ORDER — MEROPENEM 1 G/30ML
1000 INJECTION INTRAVENOUS EVERY 8 HOURS
Refills: 0 | Status: DISCONTINUED | OUTPATIENT
Start: 2023-05-04 | End: 2023-05-05

## 2023-05-04 RX ORDER — DEXTROSE 50 % IN WATER 50 %
15 SYRINGE (ML) INTRAVENOUS ONCE
Refills: 0 | Status: DISCONTINUED | OUTPATIENT
Start: 2023-05-04 | End: 2023-05-22

## 2023-05-04 RX ORDER — POLYETHYLENE GLYCOL 3350 17 G/17G
17 POWDER, FOR SOLUTION ORAL
Qty: 0 | Refills: 0 | DISCHARGE

## 2023-05-04 RX ORDER — ACETAMINOPHEN 500 MG
1 TABLET ORAL
Qty: 0 | Refills: 0 | DISCHARGE

## 2023-05-04 RX ORDER — INSULIN LISPRO 100/ML
VIAL (ML) SUBCUTANEOUS
Refills: 0 | Status: DISCONTINUED | OUTPATIENT
Start: 2023-05-04 | End: 2023-05-22

## 2023-05-04 RX ORDER — TRAMADOL HYDROCHLORIDE 50 MG/1
50 TABLET ORAL EVERY 8 HOURS
Refills: 0 | Status: DISCONTINUED | OUTPATIENT
Start: 2023-05-04 | End: 2023-05-05

## 2023-05-04 RX ORDER — ENOXAPARIN SODIUM 100 MG/ML
40 INJECTION SUBCUTANEOUS EVERY 24 HOURS
Refills: 0 | Status: DISCONTINUED | OUTPATIENT
Start: 2023-05-04 | End: 2023-05-04

## 2023-05-04 RX ORDER — DEXTROSE 50 % IN WATER 50 %
12.5 SYRINGE (ML) INTRAVENOUS ONCE
Refills: 0 | Status: DISCONTINUED | OUTPATIENT
Start: 2023-05-04 | End: 2023-05-22

## 2023-05-04 RX ORDER — ACETAMINOPHEN 500 MG
650 TABLET ORAL EVERY 6 HOURS
Refills: 0 | Status: DISCONTINUED | OUTPATIENT
Start: 2023-05-04 | End: 2023-05-22

## 2023-05-04 RX ORDER — ONDANSETRON 8 MG/1
4 TABLET, FILM COATED ORAL EVERY 8 HOURS
Refills: 0 | Status: DISCONTINUED | OUTPATIENT
Start: 2023-05-04 | End: 2023-05-22

## 2023-05-04 RX ORDER — VANCOMYCIN HCL 1 G
1000 VIAL (EA) INTRAVENOUS ONCE
Refills: 0 | Status: COMPLETED | OUTPATIENT
Start: 2023-05-04 | End: 2023-05-04

## 2023-05-04 RX ORDER — BUDESONIDE, MICRONIZED 100 %
0.5 POWDER (GRAM) MISCELLANEOUS
Refills: 0 | Status: DISCONTINUED | OUTPATIENT
Start: 2023-05-04 | End: 2023-05-05

## 2023-05-04 RX ORDER — IPRATROPIUM/ALBUTEROL SULFATE 18-103MCG
3 AEROSOL WITH ADAPTER (GRAM) INHALATION ONCE
Refills: 0 | Status: COMPLETED | OUTPATIENT
Start: 2023-05-04 | End: 2023-05-04

## 2023-05-04 RX ORDER — APIXABAN 2.5 MG/1
5 TABLET, FILM COATED ORAL
Refills: 0 | Status: DISCONTINUED | OUTPATIENT
Start: 2023-05-04 | End: 2023-05-22

## 2023-05-04 RX ADMIN — Medication 500000 UNIT(S): at 23:21

## 2023-05-04 RX ADMIN — Medication 650 MILLIGRAM(S): at 18:52

## 2023-05-04 RX ADMIN — SODIUM CHLORIDE 1000 MILLILITER(S): 9 INJECTION INTRAMUSCULAR; INTRAVENOUS; SUBCUTANEOUS at 10:37

## 2023-05-04 RX ADMIN — MAGNESIUM OXIDE 400 MG ORAL TABLET 400 MILLIGRAM(S): 241.3 TABLET ORAL at 16:37

## 2023-05-04 RX ADMIN — Medication 3 MILLILITER(S): at 12:25

## 2023-05-04 RX ADMIN — Medication 40 MILLIGRAM(S): at 18:52

## 2023-05-04 RX ADMIN — MEROPENEM 100 MILLIGRAM(S): 1 INJECTION INTRAVENOUS at 22:57

## 2023-05-04 RX ADMIN — Medication 100 MILLIGRAM(S): at 16:30

## 2023-05-04 RX ADMIN — ONDANSETRON 4 MILLIGRAM(S): 8 TABLET, FILM COATED ORAL at 16:30

## 2023-05-04 RX ADMIN — Medication 0.5 MILLIGRAM(S): at 21:07

## 2023-05-04 RX ADMIN — Medication 650 MILLIGRAM(S): at 19:10

## 2023-05-04 RX ADMIN — SODIUM CHLORIDE 1000 MILLILITER(S): 9 INJECTION INTRAMUSCULAR; INTRAVENOUS; SUBCUTANEOUS at 15:58

## 2023-05-04 RX ADMIN — Medication 1000 MILLIGRAM(S): at 11:05

## 2023-05-04 RX ADMIN — Medication 3 MILLILITER(S): at 21:08

## 2023-05-04 RX ADMIN — Medication 400 MILLIGRAM(S): at 10:37

## 2023-05-04 RX ADMIN — Medication 250 MILLIGRAM(S): at 12:25

## 2023-05-04 RX ADMIN — MEXILETINE HYDROCHLORIDE 200 MILLIGRAM(S): 150 CAPSULE ORAL at 23:20

## 2023-05-04 RX ADMIN — INSULIN GLARGINE 25 UNIT(S): 100 INJECTION, SOLUTION SUBCUTANEOUS at 23:22

## 2023-05-04 RX ADMIN — APIXABAN 5 MILLIGRAM(S): 2.5 TABLET, FILM COATED ORAL at 23:20

## 2023-05-04 RX ADMIN — Medication 0: at 17:01

## 2023-05-04 RX ADMIN — SODIUM CHLORIDE 125 MILLILITER(S): 9 INJECTION INTRAMUSCULAR; INTRAVENOUS; SUBCUTANEOUS at 17:24

## 2023-05-04 RX ADMIN — SODIUM CHLORIDE 1000 MILLILITER(S): 9 INJECTION INTRAMUSCULAR; INTRAVENOUS; SUBCUTANEOUS at 12:44

## 2023-05-04 RX ADMIN — ONDANSETRON 4 MILLIGRAM(S): 8 TABLET, FILM COATED ORAL at 10:37

## 2023-05-04 RX ADMIN — SODIUM CHLORIDE 125 MILLILITER(S): 9 INJECTION INTRAMUSCULAR; INTRAVENOUS; SUBCUTANEOUS at 22:37

## 2023-05-04 NOTE — ED PROVIDER NOTE - PHYSICAL EXAMINATION
Gen: Well appearing in NAD  Head: NC/AT  Neck: trachea midline  Resp:  No distress  Ext: no deformities  Neuro:  A&O appears non focal  Skin:  Warm and dry as visualized. Pick line LUE   Psych:  Normal affect and mood

## 2023-05-04 NOTE — ED PROVIDER NOTE - NSICDXPASTSURGICALHX_GEN_ALL_CORE_FT
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    H/O pelvic surgery 5 years ago - s/p fracture    H/O total knee replacement, bilateral 5 years ago    History of partial hysterectomy 30 years ago - fibroids    History of sinus surgery multiple sinus surgeries    History of tracheomalacia 2015 - attempted tracheal stenting ([facility])- course complicated by obstruction, respiratory failure, multiple CPR attempts -  stent discontinued; 10/20/2016 Tracheobronchoplasty (Prolene Mesh) performed at [facility] by Dr Zapien    Rectal bleeding exam under anesthesia ([facility]) 2/2018    S/P bronchoscopy 6/5/2018 - [facility] (Dr Zapien) no evidence of tracheobronchomalacia in trachea or bronchial tubes

## 2023-05-04 NOTE — ED ADULT TRIAGE NOTE - CHIEF COMPLAINT QUOTE
[facility] 2 days.  as per EMS, patient has been feeling under the weather the last couple of days, was started on IV meropenum on tuesday night for "infection."  patient noted to be febrile in triage.  EMS notes respiratory virus going around nursing facility.

## 2023-05-04 NOTE — ED ADULT NURSE NOTE - CHIEF COMPLAINT QUOTE
patient brought in by EMS from [facility] c/o lethargy x 2 days.  as per EMS, patient has been feeling under the weather the last couple of days, was started on IV meropenum on tuesday night for "infection."  patient noted to be febrile in triage.  EMS notes respiratory virus going around nursing facility.

## 2023-05-04 NOTE — ED ADULT NURSE NOTE - OBJECTIVE STATEMENT
Patient presents to the ER from [facility] with complaints of lethargy, nausea, productive cough, shortness of breath and fever. Patient with daughter at bedside, who states there is a "respiratory virus going around the place." Patient on 2L nasal cannula as she was just started on it and started on Meropenum Tuesday at facility. Patient with midline to left upper arm, patent, flushes without difficulty and positive blood return. IV RN Karla made aware. Patient with generalized weakness, heels elevated off mattress and per daughter, healed wound to elbow and sacrum.

## 2023-05-04 NOTE — ED ADULT NURSE REASSESSMENT NOTE - NS ED NURSE REASSESS COMMENT FT1
Pt. received from previous RN in stable condition, able to make all needs known.  No s/sx of pain.  Moist productive cough observed, tan, thick. ED provider removed mid-line from LUE, site ecchymotic, gauze with tape in place.  Hypotensive at current time, 1,000ml bolus in prog, will be followed by continuous IVF's, NS at 125cc/hr.  No other issues at the moment, will continue to monitor.
Pt. remains in stable condition, no s/sx of resp. distress noted.  Hypotension rectified, VSS, maintenance fluids continued.  Report given to 2SW, transferred safely via stretcher to in-patient unit.
Patient repositioned at this time, linens changed, HOB >30 degrees, feet elevated off mattress. Plan of care discussed with patient and daughter who verbalized understanding. Patient states when she has to urinate she will let me know for sample.

## 2023-05-04 NOTE — H&P ADULT - HISTORY OF PRESENT ILLNESS
HPI:  72F w/PMH TIA, DVT, Colorectal CA s/p tx, Tracheobronchomalacia s/p Tracheobronchoplasty  (no further evidence), adrenal insufficiency on steroids, COPD/Asthma, Afib/AC, T2DM, recent admit 2 mos ago,  presents from NH for fever x2 days.  She had Picc line placed and started on meropenem empirically 2 days ago, but continues to have fever and worsening productive cough and sob. Pt is AAOx3 and gives her own hx.  She endorses dry heaves and vomiting  last night, denies any diarrhea/abd pain.  At time of my exam, pt found to be hypotensive on repeat bp checks.      In ED, MD d/w ID, rec to remove picc line (to be done by ED), given vanco iV, 1L ivf, duoneb, +febrile, CT chest: Increased infectious or inflammatory bronchitis/bronchiolitis since   2023.          PAST MEDICAL & SURGICAL HISTORY:  Atrial fibrillation paroxysmal, on eliquis  Diabetes Type 2  COPD (chronic obstructive pulmonary disease)  Adrenal insufficiency Medrol daily for over 50 years  Aortic insufficiency moderate AR on echo 5/3/2018  Pelvic fracture  Asthma  Colorectal cancer 2018- last treatment , chemo and radiation  Rectal bleeding  Seizure x 1 18  DVT (deep venous thrombosis) 15-20 years ago, took coumadin  TIA (transient ischemic attack) multiple, last 5 years ago - presents as right-sided weakness  History of partial hysterectomy 30 years ago - fibroids  H/O total knee replacement, bilateral 5 years ago  History of sinus surgery, multiple sinus surgeries  Exostosis of orbit, 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  H/O pelvic surgery 5 years ago - s/p fracture  History of tracheomalacia  - attempted tracheal stenting ([facility])- course complicated by obstruction, respiratory failure, multiple CPR attempts -  stent discontinued; 10/20/2016 Tracheobronchoplasty (Prolene Mesh) performed at [facility] by Dr Zapien, S/P bronchoscopy 2018 - [facility] (Dr Zapien) no evidence of tracheobronchomalacia in trachea or bronchial tubes      Social History:   Currently in [facility]NH  needs assistance to ambulate  denies smoking/etoh    FAMILY HISTORY:  Family history of asthma  Family history of breast cancer (Sibling)  Family history of diabetes mellitus type II        MEDICATIONS  (STANDING):  albuterol/ipratropium for Nebulization 3 milliLiter(s) Nebulizer every 6 hours  apixaban 5 milliGRAM(s) Oral two times a day  buDESOnide    Inhalation Suspension 0.5 milliGRAM(s) Inhalation two times a day  dextrose 5%. 1000 milliLiter(s) (50 mL/Hr) IV Continuous <Continuous>  dextrose 5%. 1000 milliLiter(s) (100 mL/Hr) IV Continuous <Continuous>  dextrose 50% Injectable 25 Gram(s) IV Push once  dextrose 50% Injectable 12.5 Gram(s) IV Push once  dextrose 50% Injectable 25 Gram(s) IV Push once  glucagon  Injectable 1 milliGRAM(s) IntraMuscular once  insulin glargine Injectable (LANTUS) 25 Unit(s) SubCutaneous at bedtime  insulin lispro (ADMELOG) corrective regimen sliding scale   SubCutaneous three times a day before meals  insulin lispro (ADMELOG) corrective regimen sliding scale   SubCutaneous at bedtime  magnesium oxide 400 milliGRAM(s) Oral two times a day with meals  meropenem  IVPB 1000 milliGRAM(s) IV Intermittent every 8 hours  methylPREDNISolone sodium succinate Injectable 40 milliGRAM(s) IV Push two times a day  mexiletine 200 milliGRAM(s) Oral every 8 hours  nystatin    Suspension 244935 Unit(s) Oral every 8 hours  polyethylene glycol 3350 17 Gram(s) Oral daily  sodium chloride 0.9%. 1000 milliLiter(s) (125 mL/Hr) IV Continuous <Continuous>  vancomycin  IVPB 1250 milliGRAM(s) IV Intermittent every 12 hours    MEDICATIONS  (PRN):  acetaminophen     Tablet .. 650 milliGRAM(s) Oral every 6 hours PRN Temp greater or equal to 38C (100.4F), Mild Pain (1 - 3)  albuterol    90 MICROgram(s) HFA Inhaler 1 Puff(s) Inhalation four times a day PRN Shortness of Breath and/or Wheezing  aluminum hydroxide/magnesium hydroxide/simethicone Suspension 30 milliLiter(s) Oral every 4 hours PRN Dyspepsia  benzonatate 100 milliGRAM(s) Oral every 8 hours PRN Cough  dextrose Oral Gel 15 Gram(s) Oral once PRN Blood Glucose LESS THAN 70 milliGRAM(s)/deciliter  guaifenesin/dextromethorphan Oral Liquid 10 milliLiter(s) Oral every 4 hours PRN Cough  melatonin 3 milliGRAM(s) Oral at bedtime PRN Insomnia  ondansetron Injectable 4 milliGRAM(s) IV Push every 8 hours PRN Nausea and/or Vomiting  traMADol 50 milliGRAM(s) Oral every 8 hours PRN Moderate Pain (4 - 6)      CAPILLARY BLOOD GLUCOSE  POCT Blood Glucose.: 95 mg/dL (04 May 2023 16:46)        LABS:                        13.3   10.20 )-----------( 193      ( 04 May 2023 09:51 )             45.7         136  |  100  |  12  ----------------------------<  135<H>  3.7   |  30  |  0.65    Ca    8.9      04 May 2023 09:51    TPro  7.6  /  Alb  3.2<L>  /  TBili  0.3  /  DBili  x   /  AST  27  /  ALT  21  /  AlkPhos  104  05-          Urinalysis Basic - ( 04 May 2023 13:31 )    Color: Yellow / Appearance: Clear / S.015 / pH: x  Gluc: x / Ketone: Small  / Bili: Negative / Urobili: Negative   Blood: x / Protein: Negative / Nitrite: Negative   Leuk Esterase: Negative / RBC: 6-10 /HPF / WBC 3-5 /HPF   Sq Epi: x / Non Sq Epi: x / Bacteria: Occasional        RADIOLOGY & ADDITIONAL TESTS:    Imaging Personally Reviewed:  CT chest: Increased infectious or inflammatory bronchitis/bronchiolitis since 2023.      EKG Personally Reviewed:    Consultant(s) Notes Reviewed:      Care Discussed with Consultants/Other Providers: ICU PA

## 2023-05-04 NOTE — H&P ADULT - NSHPPHYSICALEXAM_GEN_ALL_CORE
Vital Signs Last 24 Hrs  T(C): 37.3 (04 May 2023 17:44), Max: 38.3 (04 May 2023 10:33)  T(F): 99.1 (04 May 2023 17:44), Max: 101 (04 May 2023 10:33)  HR: 87 (04 May 2023 17:44) (87 - 101)  BP: 150/73 (04 May 2023 17:44) (90/61 - 150/73)  BP(mean): 70 (04 May 2023 15:23) (70 - 77)  RR: 18 (04 May 2023 17:44) (16 - 18)  SpO2: 100% (04 May 2023 17:44) (95% - 100%)    Parameters below as of 04 May 2023 17:44  Patient On (Oxygen Delivery Method): nasal cannula  O2 Flow (L/min): 2

## 2023-05-04 NOTE — ED PROVIDER NOTE - OBJECTIVE STATEMENT
71 y/o female with a pmhx of TIA, DVT, seizure, rectal bleeding, colorectal cancer, tracheobronchomalacia, asthma, pelvic fracture, aortic insufficieny, adrenal insufficieny, COPD, DM2, Afib, presents to the ED [facility] from [facility] with daughter c/o lethargy x 2 days. As per EMS, patient has been feeling under the weather the last couple of days, was started on IV Meropenum on Tuesday night for "infection." As per daughter at bedside, pt also receives Vancomycin with Meropenum for infection, but wanted pt to receive Vancomycin in ED so came to [facility] for evaluation. Pt with fever in ED of 100.6 but steroid dependent. 71 y/o female with a PMHx of TIA, DVT, seizure, rectal bleeding, colorectal cancer, tracheobronchomalacia, asthma, pelvic fracture, aortic insufficieny, adrenal insufficieny, COPD, DM2, Afib, presents to the ED [facility] from [facility] with daughter c/o lethargy x 2 days. As per EMS, patient has been feeling under the weather the last couple of days, was started on IV Meropenum on Tuesday night for "infection." As per daughter at bedside, pt also receives Vancomycin with Meropenum for infection, but wanted pt to receive Vancomycin in ED so came to [facility] for evaluation. Pt with fever in ED of 100.6 but steroid dependent.

## 2023-05-04 NOTE — CONSULT NOTE ADULT - ASSESSMENT
A:    72F  HD # 1  FULL CODE    Here for:    1. Sepsis 2/2  2. PNA  3. Parainfluenza  4. Dehydration/hypovolemia  5. Hypoxia    P/recommendations:    Hypotensive, responding to IVF.  Awake and alert, not in shock.  LA wnl  Suspect sepsis 2/2 PNA.    - > 30cc/kg IVF  - Broad spectrum abx  - Sepsis labs  - Consider system steroids; medrol dose pack on MAR, may benefit from stress dose steroids, also in setting of chronic asthma    Dispo: OK for floor admission.     If hypotension develops despite IVF resuscitation, call back ICU svc.

## 2023-05-04 NOTE — H&P ADULT - ASSESSMENT
72F w/PMH TIA, DVT, Colorectal CA s/p tx, Tracheobronchomalacia s/p Tracheobronchoplasty 2016 (no further evidence), adrenal insufficiency on steroids, COPD/Asthma, PAfib/AC, T2DM, recent admit 2 mos ago,  presents from NH for fever x2 days.  +worsening productive cough and sob.   In ED, MD d/w ID, rec to remove picc line (to be done by ED), given vanco iV, 1L ivf, duoneb, +febrile, CT chest: Increased infectious or inflammatory bronchitis/bronchiolitis since   4/5/2023.      #Sepsis as evidenced by hypotension, fever in setting of pneumonia  #pneumonia  #Asthma exacerbation  - IVF  - ICU eval for hypotension  - IV solumedrol 40mg bid  - duonebs, alb inh prn  - pulm cs  - ID cs  - check cultures  - start iv meropenem/vanco  - antitussives  -monitor labs, vitals    #T2DM  - resume home dose insulin  - start ssi  - monitor FS  - check HA1c    #Parox Afib, TIA  - c/w AC    #PPX- on AC

## 2023-05-04 NOTE — ED ADULT NURSE NOTE - NSIMPLEMENTINTERV_GEN_ALL_ED
Implemented All Fall with Harm Risk Interventions:  [place] to call system. Call bell, personal items and telephone within reach. Instruct patient to call for assistance. Room bathroom lighting operational. Non-slip footwear when patient is off stretcher. Physically safe environment: no spills, clutter or unnecessary equipment. Stretcher in lowest position, wheels locked, appropriate side rails in place. Provide visual cue, wrist band, yellow gown, etc. Monitor gait and stability. Monitor for mental status changes and reorient to person, place, and time. Review medications for side effects contributing to fall risk. Reinforce activity limits and safety measures with patient and family. Provide visual clues: red socks.

## 2023-05-04 NOTE — ED PROVIDER NOTE - PROGRESS NOTE DETAILS
Georgi Romero for attending Dr. Arrieta: Spoke with Dr. Rutherford, recommends giving vancomycin, removing line and admitting pt.

## 2023-05-04 NOTE — PATIENT PROFILE ADULT - FALL HARM RISK - HARM RISK INTERVENTIONS

## 2023-05-04 NOTE — ED ADULT NURSE NOTE - NSICDXPASTSURGICALHX_GEN_ALL_CORE_FT
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    H/O pelvic surgery 5 years ago - s/p fracture    H/O total knee replacement, bilateral 5 years ago    History of partial hysterectomy 30 years ago - fibroids    History of sinus surgery multiple sinus surgeries    History of tracheomalacia 2015 - attempted tracheal stenting ([facility])- course complicated by obstruction, respiratory failure, multiple CPR attempts -  stent discontinued; 10/20/2016 Tracheobronchoplasty (Prolene Mesh) performed at [facility] by Dr Zapien    Rectal bleeding exam under anesthesia ([facility]) 2/2018    S/P bronchoscopy 6/5/2018 - [facility] (Dr Zapien) no evidence of tracheobronchomalacia in trachea or bronchial tubes

## 2023-05-04 NOTE — ED PROVIDER NOTE - IV ALTEPLASE INCLUSION HIDDEN
"    4/10/2018         RE: Lesli Grey  [address] [facility]  [place] [address]        Dear Colleague,    Thank you for referring your patient, Lesli Grey, to the [facility]. Please see a copy of my visit note below.    Name: Lesli Grey  Seen at the request of No ref. provider found for Hypoglycemia.  HPI:  Lesli Grey is a 37 year old female who presents for the evaluation of:    1. Hypoglycemia.  Milder symptoms started \"as far back\" as she remembers but have been worsening the past 3 years and significantly pronounced the past 6 months.  Feels symptoms are now substantially interfering in her day to day life.  Symptoms occur when she waits too long to eat or if she doesn't eat first thing in the morning after waking up.  Symptoms occur most commonly in the late morning.    Four years ago, symptom occurred twice per year.  Six months ago, symptoms occurred at least once per month.  Recently, symptoms occur daily with the exception of the past 9 days while on vacation and was particularly focused on eating constantly to avoid symptoms.  Symptoms include shakiness, weakness, increased anxiety, confusion, slurred speech, vision changes (loss of peripheral vision and inability to understand written words).  These symptoms are relieved by eating.  Previously were relieved immediately by eating, now it seems to take 20-30 minutes before symptoms subside.  She has found avoiding caffeine and high-sugar foods has helped decrease symptoms/episodes.  No known family history of diabetes.  States she was \"borderline\" GD with her last pregnancy 6 years ago.   GI surgeries: No   Gastric Bypass: Np  Before/After meals: after meals  Fasting: No fasting hypoglycemia  Symptoms: yes, multiple, see above  Family history of DM:   No   Checked BS during \"episodes\": Not yet  History of Cancers:   No  Thyroid Dysfunction:  No  PMH/PSH:  Past Medical History:   Diagnosis Date     NO ACTIVE " PROBLEMS      Past Surgical History:   Procedure Laterality Date     HC INSERTION INTRAUTERINE DEVICE  9-11-12    Mirena     HC LEVONORGESTREL IU 52MG 5 YR  08/15/2017     TONSILLECTOMY  1988     Family Hx:  Family History   Problem Relation Age of Onset     CANCER Maternal Grandmother      Thyroid disease:  No        DM2: No         Autoimmune: DM1, SLE, RA, Vitiligo     Social Hx:  Social History     Social History     Marital status:      Spouse name: Foster     Number of children: 2     Years of education: N/A     Occupational History     asst teacher [employer]     Social History Main Topics     Smoking status: Never Smoker     Smokeless tobacco: Never Used     Alcohol use Yes     Drug use: No     Sexual activity: Yes     Partners: Male     Birth control/ protection: IUD      Comment: Mirena placed 8-15-17,remove 8-15-23     Other Topics Concern     Not on file     Social History Narrative    .    Daughter Jocelynn, born 3/13/08    .        Caffeine intake/servings daily - less than 1    Calcium intake/servings daily - at least 3    Exercise 0 times weekly - describe none    Sunscreen used - Sometimes    Seatbelts used - Yes    Guns stored in the home - No    Self Breast Exam - no    Pap test up to date -  Yes    Eye exam up to date -  Yes    Dental exam up to date -  Yes    DEXA scan up to date -  Not Applicable    Flex Sig/Colonoscopy up to date -  Not Applicable    Mammography up to date -  Not Applicable    Immunizations reviewed and up to date - No and last one was 10 yrs ago    Abuse: Current or Past (Physical, Sexual or Emotional) - No    Do you feel safe in your environment - Yes    Do you cope well with stress - Yes    Do you suffer from insomnia - No    Last updated by: Moon Whitaker  12/26/2011                          MEDICATIONS:  has a current medication list which includes the following prescription(s): blood glucose monitoring, blood glucose  monitoring, levonorgestrel, and multiple vitamins-minerals.    ROS     ROS: 10 point ROS neg other than the symptoms noted above in the HPI.    Physical Exam   VS: /68 (BP Location: Left arm, Patient Position: Chair, Cuff Size: Adult Regular)  Pulse 77  Temp 98.1  F (36.7  C) (Oral)  Resp 16  Wt 86.6 kg (191 lb)  LMP  (LMP Unknown)  BMI 30.83 kg/m2  GENERAL: AXOX3, NAD, well dressed, answering questions appropriately, appears stated age.  HEENT: no exophthalmos, no proptosis, EOMI, no lig lag, no retraction  CV: RRR, no rubs, gallops, no murmurs  LUNGS: CTAB, no wheezes, rales, or rhonchi  ABDOMEN: soft, nontender, nondistended  EXTREMITIES: no edema, +pulses, no rashes, no lesions  NEUROLOGY: CN grossly intact, no tremors  MSK: grossly intact  SKIN: no rashes, no lesions    LABS:  BMP:  LFTs:  TFTs:  A1c:  C- Peptide:    All pertinent notes, labs, and images personally reviewed by me.     A/P  Ms.Catherine TIERRA Grey is a 37 year old here for the evaluation of:    1. Hypoglycemia    No fasting symptoms.  No documented hypoglycemia seen on previous labs.  Symptoms most consistent with reactive hypoglycemia.  Previous hypoglycemic work up done in 2015 was unremarkable.  Will obtain hypoglycemic work up.  Glucose meter provided.  Recommend she keep a symptom diary and check her blood sugar/record the reading when symptomatic.  Also requested she record symptoms, severity, previous meal (timing and content), along with treatment (type and response).  If labs are unremarkable, consider rechecking insulin, glucose, and c-peptide while symptomatic.    Labs ordered today:   Orders Placed This Encounter   Procedures     Beta hydroxybutyrate level     Cortisol     C-peptide     Glucose     Insulin antibody     Insulin Growth Factor 1 by Immunoassay     Insulin level     Proinsulin     TSH     T4 FREE     Hemoglobin A1c     Radiology/Consults ordered today: None    More than 50% of the time spent with Ms. Grey on  counseling / coordinating her care.  Total face to face time was 45 minutes.      Follow-up:  To be determined based on lab results    Ama Grove NP  Endocrinology  [facility]  CC: Jamila Perez     Again, thank you for allowing me to participate in the care of your patient.        Sincerely,        GUERLINE Mendenhall CNP     show

## 2023-05-05 DIAGNOSIS — R06.00 DYSPNEA, UNSPECIFIED: ICD-10-CM

## 2023-05-05 DIAGNOSIS — J47.9 BRONCHIECTASIS, UNCOMPLICATED: ICD-10-CM

## 2023-05-05 DIAGNOSIS — J96.01 ACUTE RESPIRATORY FAILURE WITH HYPOXIA: ICD-10-CM

## 2023-05-05 DIAGNOSIS — B34.8 OTHER VIRAL INFECTIONS OF UNSPECIFIED SITE: ICD-10-CM

## 2023-05-05 DIAGNOSIS — J45.909 UNSPECIFIED ASTHMA, UNCOMPLICATED: ICD-10-CM

## 2023-05-05 DIAGNOSIS — J39.8 OTHER SPECIFIED DISEASES OF UPPER RESPIRATORY TRACT: ICD-10-CM

## 2023-05-05 LAB
ANION GAP SERPL CALC-SCNC: 7 MMOL/L — SIGNIFICANT CHANGE UP (ref 5–17)
BUN SERPL-MCNC: 12 MG/DL — SIGNIFICANT CHANGE UP (ref 7–23)
CALCIUM SERPL-MCNC: 8.7 MG/DL — SIGNIFICANT CHANGE UP (ref 8.5–10.1)
CHLORIDE SERPL-SCNC: 107 MMOL/L — SIGNIFICANT CHANGE UP (ref 96–108)
CO2 SERPL-SCNC: 23 MMOL/L — SIGNIFICANT CHANGE UP (ref 22–31)
CREAT SERPL-MCNC: 0.56 MG/DL — SIGNIFICANT CHANGE UP (ref 0.5–1.3)
EGFR: 96 ML/MIN/1.73M2 — SIGNIFICANT CHANGE UP
GLUCOSE BLDC GLUCOMTR-MCNC: 244 MG/DL — HIGH (ref 70–99)
GLUCOSE BLDC GLUCOMTR-MCNC: 253 MG/DL — HIGH (ref 70–99)
GLUCOSE BLDC GLUCOMTR-MCNC: 279 MG/DL — HIGH (ref 70–99)
GLUCOSE BLDC GLUCOMTR-MCNC: 317 MG/DL — HIGH (ref 70–99)
GLUCOSE SERPL-MCNC: 248 MG/DL — HIGH (ref 70–99)
HCT VFR BLD CALC: 42 % — SIGNIFICANT CHANGE UP (ref 34.5–45)
HGB BLD-MCNC: 11.8 G/DL — SIGNIFICANT CHANGE UP (ref 11.5–15.5)
MCHC RBC-ENTMCNC: 21.4 PG — LOW (ref 27–34)
MCHC RBC-ENTMCNC: 28.1 GM/DL — LOW (ref 32–36)
MCV RBC AUTO: 76.2 FL — LOW (ref 80–100)
PLATELET # BLD AUTO: 181 K/UL — SIGNIFICANT CHANGE UP (ref 150–400)
POTASSIUM SERPL-MCNC: 4.9 MMOL/L — SIGNIFICANT CHANGE UP (ref 3.5–5.3)
POTASSIUM SERPL-SCNC: 4.9 MMOL/L — SIGNIFICANT CHANGE UP (ref 3.5–5.3)
RBC # BLD: 5.51 M/UL — HIGH (ref 3.8–5.2)
RBC # FLD: 23.7 % — HIGH (ref 10.3–14.5)
SODIUM SERPL-SCNC: 137 MMOL/L — SIGNIFICANT CHANGE UP (ref 135–145)
WBC # BLD: 8.07 K/UL — SIGNIFICANT CHANGE UP (ref 3.8–10.5)
WBC # FLD AUTO: 8.07 K/UL — SIGNIFICANT CHANGE UP (ref 3.8–10.5)

## 2023-05-05 PROCEDURE — 99497 ADVNCD CARE PLAN 30 MIN: CPT

## 2023-05-05 PROCEDURE — 99233 SBSQ HOSP IP/OBS HIGH 50: CPT

## 2023-05-05 PROCEDURE — 99223 1ST HOSP IP/OBS HIGH 75: CPT

## 2023-05-05 RX ORDER — MONTELUKAST 4 MG/1
10 TABLET, CHEWABLE ORAL AT BEDTIME
Refills: 0 | Status: DISCONTINUED | OUTPATIENT
Start: 2023-05-05 | End: 2023-05-22

## 2023-05-05 RX ORDER — ALBUTEROL 90 UG/1
2 AEROSOL, METERED ORAL EVERY 4 HOURS
Refills: 0 | Status: DISCONTINUED | OUTPATIENT
Start: 2023-05-05 | End: 2023-05-22

## 2023-05-05 RX ORDER — IPRATROPIUM/ALBUTEROL SULFATE 18-103MCG
3 AEROSOL WITH ADAPTER (GRAM) INHALATION EVERY 6 HOURS
Refills: 0 | Status: DISCONTINUED | OUTPATIENT
Start: 2023-05-05 | End: 2023-05-12

## 2023-05-05 RX ORDER — ACETYLCYSTEINE 200 MG/ML
4 VIAL (ML) MISCELLANEOUS THREE TIMES A DAY
Refills: 0 | Status: DISCONTINUED | OUTPATIENT
Start: 2023-05-05 | End: 2023-05-22

## 2023-05-05 RX ORDER — BUDESONIDE, MICRONIZED 100 %
0.5 POWDER (GRAM) MISCELLANEOUS EVERY 12 HOURS
Refills: 0 | Status: DISCONTINUED | OUTPATIENT
Start: 2023-05-05 | End: 2023-05-22

## 2023-05-05 RX ORDER — FLUCONAZOLE 150 MG/1
100 TABLET ORAL DAILY
Refills: 0 | Status: DISCONTINUED | OUTPATIENT
Start: 2023-05-05 | End: 2023-05-22

## 2023-05-05 RX ADMIN — Medication 8: at 17:45

## 2023-05-05 RX ADMIN — SODIUM CHLORIDE 125 MILLILITER(S): 9 INJECTION INTRAMUSCULAR; INTRAVENOUS; SUBCUTANEOUS at 19:27

## 2023-05-05 RX ADMIN — Medication 10 MILLILITER(S): at 00:03

## 2023-05-05 RX ADMIN — Medication 3 MILLILITER(S): at 04:11

## 2023-05-05 RX ADMIN — MEXILETINE HYDROCHLORIDE 200 MILLIGRAM(S): 150 CAPSULE ORAL at 12:56

## 2023-05-05 RX ADMIN — Medication 500000 UNIT(S): at 05:52

## 2023-05-05 RX ADMIN — SODIUM CHLORIDE 125 MILLILITER(S): 9 INJECTION INTRAMUSCULAR; INTRAVENOUS; SUBCUTANEOUS at 05:52

## 2023-05-05 RX ADMIN — TRAMADOL HYDROCHLORIDE 50 MILLIGRAM(S): 50 TABLET ORAL at 06:01

## 2023-05-05 RX ADMIN — Medication 3 MILLILITER(S): at 14:34

## 2023-05-05 RX ADMIN — Medication 166.67 MILLIGRAM(S): at 09:09

## 2023-05-05 RX ADMIN — Medication 60 MILLIGRAM(S): at 17:46

## 2023-05-05 RX ADMIN — Medication 6: at 12:57

## 2023-05-05 RX ADMIN — Medication 166.67 MILLIGRAM(S): at 00:03

## 2023-05-05 RX ADMIN — Medication 2: at 21:59

## 2023-05-05 RX ADMIN — Medication 3 MILLILITER(S): at 09:52

## 2023-05-05 RX ADMIN — Medication 3 MILLILITER(S): at 20:52

## 2023-05-05 RX ADMIN — MAGNESIUM OXIDE 400 MG ORAL TABLET 400 MILLIGRAM(S): 241.3 TABLET ORAL at 17:49

## 2023-05-05 RX ADMIN — TRAMADOL HYDROCHLORIDE 50 MILLIGRAM(S): 50 TABLET ORAL at 07:01

## 2023-05-05 RX ADMIN — MAGNESIUM OXIDE 400 MG ORAL TABLET 400 MILLIGRAM(S): 241.3 TABLET ORAL at 09:09

## 2023-05-05 RX ADMIN — MEROPENEM 100 MILLIGRAM(S): 1 INJECTION INTRAVENOUS at 05:22

## 2023-05-05 RX ADMIN — MEXILETINE HYDROCHLORIDE 200 MILLIGRAM(S): 150 CAPSULE ORAL at 05:52

## 2023-05-05 RX ADMIN — Medication 0.5 MILLIGRAM(S): at 20:52

## 2023-05-05 RX ADMIN — MONTELUKAST 10 MILLIGRAM(S): 4 TABLET, CHEWABLE ORAL at 21:58

## 2023-05-05 RX ADMIN — Medication 4 MILLILITER(S): at 20:54

## 2023-05-05 RX ADMIN — Medication 4: at 09:09

## 2023-05-05 RX ADMIN — MEXILETINE HYDROCHLORIDE 200 MILLIGRAM(S): 150 CAPSULE ORAL at 21:59

## 2023-05-05 RX ADMIN — INSULIN GLARGINE 25 UNIT(S): 100 INJECTION, SOLUTION SUBCUTANEOUS at 21:59

## 2023-05-05 RX ADMIN — APIXABAN 5 MILLIGRAM(S): 2.5 TABLET, FILM COATED ORAL at 21:58

## 2023-05-05 RX ADMIN — Medication 60 MILLIGRAM(S): at 23:11

## 2023-05-05 RX ADMIN — FLUCONAZOLE 100 MILLIGRAM(S): 150 TABLET ORAL at 12:04

## 2023-05-05 RX ADMIN — APIXABAN 5 MILLIGRAM(S): 2.5 TABLET, FILM COATED ORAL at 09:10

## 2023-05-05 RX ADMIN — Medication 10 MILLILITER(S): at 19:45

## 2023-05-05 RX ADMIN — Medication 0.5 MILLIGRAM(S): at 09:57

## 2023-05-05 RX ADMIN — Medication 60 MILLIGRAM(S): at 12:04

## 2023-05-05 RX ADMIN — Medication 40 MILLIGRAM(S): at 05:52

## 2023-05-05 NOTE — CONSULT NOTE ADULT - ASSESSMENT
71 y/o Female with h/o TIA, DVT, Colorectal CA s/p tx, Tracheobronchomalacia s/p tracheobronchoplasty 2016, adrenal insufficiency on steroids, COPD/Asthma, Afib/AC, T2DM was admitted on 5/4 from NH for fever x 2 days. At the NH she had PICC line placed and started on meropenem empirically 2 days PTA, but continues to have fever and worsening productive cough and SOB. She endorses dry heaves and vomiting  last night, denies any diarrhea/abd pain. In ER, pt found to be hypotensive and received vancomycin IV.     1. Febrile syndrome. Acute bronchiolitis with parainfluenza type 3. Allergy to PCN with anaphylaxis.   -obtain BC x 2     71 y/o Female with h/o TIA, DVT, Colorectal CA s/p tx, Tracheobronchomalacia s/p tracheobronchoplasty 2016, adrenal insufficiency on steroids, COPD/Asthma, Afib/AC, T2DM was admitted on 5/4 from NH for fever x 2 days. At the NH she had PICC line placed and started on meropenem empirically 2 days PTA, but continues to have fever and worsening productive cough and SOB. She endorses dry heaves and vomiting  last night, denies any diarrhea/abd pain. In ER, pt found to be hypotensive and received vancomycin IV.     1. Febrile syndrome. Acute bronchiolitis with parainfluenza type 3. Oral thrush. Allergy to PCN with anaphylaxis.   -obtain BC x 2  -CT chest noted - no infiltrates noted  -given meropenem for several days without improvement  -obtain sputum c/s  -start fluconazole 100 mg PO qd  -reason for abx use and side effects reviewed with patient; monitor BMP  -would observe off abx for now  -respiratory care  -pulmonary evaluation appreciated  -old chart reviewed to assess prior cultures  -monitor temps  -f/u CBC  -supportive care  2. Other issues:   -care per medicine

## 2023-05-05 NOTE — PHYSICAL THERAPY INITIAL EVALUATION ADULT - MODALITIES TREATMENT COMMENTS
Pt returned to supine sitting w/ HOB elevated due to fatigue and refused chair this session, NC+ 2L O2+, +alarm, LUCAS, RN aware, NAD, denies pain. SOB/wheezing resolved w/ rest

## 2023-05-05 NOTE — CONSULT NOTE ADULT - ASSESSMENT
Assessment/Plan    - Increase solumedrol to 60MG Q6H  - Aerosols with Duoneb/Budesonide  - Sputum C+S, fungus  - Mucomyst 10% TID  - Chest PT/Smart Vest  - Singulair  - Monitor O2 Sats  - Hold IV Abx-D/W Dr. Rutherford

## 2023-05-05 NOTE — PROGRESS NOTE ADULT - ASSESSMENT
72F w/PMH TIA, DVT, Colorectal CA s/p tx, Tracheobronchomalacia s/p Tracheobronchoplasty 2016 (no further evidence), adrenal insufficiency on steroids, COPD/Asthma, PAfib/AC, T2DM, recent admit 2 mos ago,  presents from NH for fever x2 days.  +worsening productive cough and sob.   In ED, MD d/w ID, rec to remove picc line (to be done by ED), given vanco iV, 1L ivf, duoneb, +febrile, CT chest: Increased infectious or inflammatory bronchitis/bronchiolitis since   4/5/2023.      #Sepsis as evidenced by hypotension, fever : No PNA  Parainfluenza Bonchiolitis  # Hx of Bronchiectasis  # Oral thrush  #Asthma exacerbation  - IVF  - ICU eval for hypotension appreciated; BP responded to iiv fluids  - IV solumedrol 40mg bid  - duonebs, alb inh prn  - pulm cs  - ID cs  - check cultures  - d/c meropenem/vanco  - antitussives  -monitor labs, vitals  increase solumedrol to 60 mg q 6 hrs  add diflucan  smart vest for chest PTx    #T2DM  - resume home dose insulin  - start ssi  - monitor FS  - check HA1c    #Parox Afib, TIA  - c/w eliquis    # DVT PPX: on Eliquis      poc discussed with pt, team,  Dr. Cedeno; Dr. Krish MEHTA and advance care planning meeting done with the patient. She wishes to be fully resuscitated and mechanically ventilated if need arises. There are no limitations of any sort in her medical care and management. She is FULL CODE. Additional time spent 19 min.

## 2023-05-05 NOTE — PROGRESS NOTE ADULT - SUBJECTIVE AND OBJECTIVE BOX
5/5: sob better  no cp  on O2 2 L nc      PHYSICAL EXAM:    Daily     Daily     Vital Signs Last 24 Hrs  T(C): 36.9 (05 May 2023 15:45), Max: 37.3 (04 May 2023 17:44)  T(F): 98.4 (05 May 2023 15:45), Max: 99.1 (04 May 2023 17:44)  HR: 90 (05 May 2023 15:45) (87 - 96)  BP: 117/57 (05 May 2023 15:45) (117/57 - 150/73)  BP(mean): --  RR: 18 (05 May 2023 15:45) (18 - 18)  SpO2: 99% (05 May 2023 15:45) (98% - 100%)    Constitutional: Weakl appearing  HEENT: Atraumatic, BEER,   Respiratory: Breath Sounds normal, no rhonchi/wheeze  Cardiovascular: N S1S2;   Gastrointestinal: Abdomen soft, non tender, Bowel Sounds present  Extremities: No edema, peripheral pulses present  Neurological: AAO x 3, no gross focal motor deficits  Skin: Non cellulitic, no rash, ulcers  Lymph Nodes: No lymphadenopathy noted  Back: No CVA tenderness   Musculoskeletal: non tender  Breasts: Deferred  Genitourinary: deferred  Rectal: Deferred    All Labs/EKG/Radiology/Meds reviewed by me                          11.8   8.07  )-----------( 181      ( 05 May 2023 06:21 )             42.0       CBC Full  -  ( 05 May 2023 06:21 )  WBC Count : 8.07 K/uL  RBC Count : 5.51 M/uL  Hemoglobin : 11.8 g/dL  Hematocrit : 42.0 %  Platelet Count - Automated : 181 K/uL  Mean Cell Volume : 76.2 fl  Mean Cell Hemoglobin : 21.4 pg  Mean Cell Hemoglobin Concentration : 28.1 gm/dL  Auto Neutrophil # : x  Auto Lymphocyte # : x  Auto Monocyte # : x  Auto Eosinophil # : x  Auto Basophil # : x  Auto Neutrophil % : x  Auto Lymphocyte % : x  Auto Monocyte % : x  Auto Eosinophil % : x  Auto Basophil % : x          137  |  107  |  12  ----------------------------<  248<H>  4.9   |  23  |  0.56    Ca    8.7      05 May 2023 06:21    TPro  7.6  /  Alb  3.2<L>  /  TBili  0.3  /  DBili  x   /  AST  27  /  ALT  21  /  AlkPhos  104  05-04      LIVER FUNCTIONS - ( 04 May 2023 09:51 )  Alb: 3.2 g/dL / Pro: 7.6 gm/dL / ALK PHOS: 104 U/L / ALT: 21 U/L / AST: 27 U/L / GGT: x                       Urinalysis Basic - ( 04 May 2023 13:31 )    Color: Yellow / Appearance: Clear / S.015 / pH: x  Gluc: x / Ketone: Small  / Bili: Negative / Urobili: Negative   Blood: x / Protein: Negative / Nitrite: Negative   Leuk Esterase: Negative / RBC: 6-10 /HPF / WBC 3-5 /HPF   Sq Epi: x / Non Sq Epi: x / Bacteria: Occasional        < from: CT Chest No Cont (23 @ 13:36) >  Increased infectious or inflammatory bronchitis/bronchiolitis since   2023.    < end of copied text >      MEDICATIONS  (STANDING):  acetylcysteine 10%  Inhalation 4 milliLiter(s) Inhalation three times a day  albuterol/ipratropium for Nebulization 3 milliLiter(s) Nebulizer every 6 hours  albuterol/ipratropium for Nebulization 3 milliLiter(s) Nebulizer every 6 hours  apixaban 5 milliGRAM(s) Oral two times a day  buDESOnide    Inhalation Suspension 0.5 milliGRAM(s) Inhalation every 12 hours  dextrose 5%. 1000 milliLiter(s) (100 mL/Hr) IV Continuous <Continuous>  dextrose 5%. 1000 milliLiter(s) (50 mL/Hr) IV Continuous <Continuous>  dextrose 50% Injectable 25 Gram(s) IV Push once  dextrose 50% Injectable 12.5 Gram(s) IV Push once  dextrose 50% Injectable 25 Gram(s) IV Push once  fluconAZOLE   Tablet 100 milliGRAM(s) Oral daily  glucagon  Injectable 1 milliGRAM(s) IntraMuscular once  insulin glargine Injectable (LANTUS) 25 Unit(s) SubCutaneous at bedtime  insulin lispro (ADMELOG) corrective regimen sliding scale   SubCutaneous three times a day before meals  insulin lispro (ADMELOG) corrective regimen sliding scale   SubCutaneous at bedtime  magnesium oxide 400 milliGRAM(s) Oral two times a day with meals  methylPREDNISolone sodium succinate Injectable 60 milliGRAM(s) IV Push every 6 hours  mexiletine 200 milliGRAM(s) Oral every 8 hours  montelukast 10 milliGRAM(s) Oral at bedtime  polyethylene glycol 3350 17 Gram(s) Oral daily  sodium chloride 0.9%. 1000 milliLiter(s) (125 mL/Hr) IV Continuous <Continuous>    MEDICATIONS  (PRN):  acetaminophen     Tablet .. 650 milliGRAM(s) Oral every 6 hours PRN Temp greater or equal to 38C (100.4F), Mild Pain (1 - 3)  albuterol    90 MICROgram(s) HFA Inhaler 1 Puff(s) Inhalation four times a day PRN Shortness of Breath and/or Wheezing  albuterol    90 MICROgram(s) HFA Inhaler 2 Puff(s) Inhalation every 4 hours PRN Shortness of Breath  aluminum hydroxide/magnesium hydroxide/simethicone Suspension 30 milliLiter(s) Oral every 4 hours PRN Dyspepsia  benzonatate 100 milliGRAM(s) Oral every 8 hours PRN Cough  dextrose Oral Gel 15 Gram(s) Oral once PRN Blood Glucose LESS THAN 70 milliGRAM(s)/deciliter  guaifenesin/dextromethorphan Oral Liquid 10 milliLiter(s) Oral every 4 hours PRN Cough  melatonin 3 milliGRAM(s) Oral at bedtime PRN Insomnia  ondansetron Injectable 4 milliGRAM(s) IV Push every 8 hours PRN Nausea and/or Vomiting  traMADol 50 milliGRAM(s) Oral every 8 hours PRN Moderate Pain (4 - 6)

## 2023-05-05 NOTE — PHYSICAL THERAPY INITIAL EVALUATION ADULT - PRECAUTIONS/LIMITATIONS, REHAB EVAL
IP droplet, Paraflu, ESBL Sputum/fall precautions Complex Repair And Flap Additional Text (Will Appearing After The Standard Complex Repair Text): The complex repair was not sufficient to completely close the primary defect. The remaining additional defect was repaired with the flap mentioned below.

## 2023-05-05 NOTE — PHYSICAL THERAPY INITIAL EVALUATION ADULT - PERTINENT HX OF CURRENT PROBLEM, REHAB EVAL
72F  presents from NH for fever x2 days.  She had Picc line placed and started on meropenem empirically 2 days ago, but continues to have fever and worsening productive cough and sob, found to be hypotensive.   PMH TIA, DVT, Colorectal CA s/p tx, Tracheobronchomalacia s/p Tracheobronchoplasty 2016 (no further evidence), adrenal insufficiency on steroids, COPD/Asthma, Afib/AC, T2DM, recent admit 2 mos ago,

## 2023-05-06 LAB
CULTURE RESULTS: SIGNIFICANT CHANGE UP
GLUCOSE BLDC GLUCOMTR-MCNC: 245 MG/DL — HIGH (ref 70–99)
GLUCOSE BLDC GLUCOMTR-MCNC: 272 MG/DL — HIGH (ref 70–99)
GLUCOSE BLDC GLUCOMTR-MCNC: 289 MG/DL — HIGH (ref 70–99)
GLUCOSE BLDC GLUCOMTR-MCNC: 325 MG/DL — HIGH (ref 70–99)
GRAM STN FLD: SIGNIFICANT CHANGE UP
SPECIMEN SOURCE: SIGNIFICANT CHANGE UP
SPECIMEN SOURCE: SIGNIFICANT CHANGE UP

## 2023-05-06 PROCEDURE — 99232 SBSQ HOSP IP/OBS MODERATE 35: CPT

## 2023-05-06 RX ADMIN — SODIUM CHLORIDE 125 MILLILITER(S): 9 INJECTION INTRAMUSCULAR; INTRAVENOUS; SUBCUTANEOUS at 02:50

## 2023-05-06 RX ADMIN — Medication 3 MILLILITER(S): at 09:48

## 2023-05-06 RX ADMIN — Medication 60 MILLIGRAM(S): at 23:07

## 2023-05-06 RX ADMIN — Medication 30 MILLILITER(S): at 12:16

## 2023-05-06 RX ADMIN — MEXILETINE HYDROCHLORIDE 200 MILLIGRAM(S): 150 CAPSULE ORAL at 13:13

## 2023-05-06 RX ADMIN — INSULIN GLARGINE 25 UNIT(S): 100 INJECTION, SOLUTION SUBCUTANEOUS at 22:04

## 2023-05-06 RX ADMIN — MEXILETINE HYDROCHLORIDE 200 MILLIGRAM(S): 150 CAPSULE ORAL at 22:04

## 2023-05-06 RX ADMIN — Medication 30 MILLILITER(S): at 23:07

## 2023-05-06 RX ADMIN — Medication 6: at 08:32

## 2023-05-06 RX ADMIN — Medication 4 MILLILITER(S): at 21:07

## 2023-05-06 RX ADMIN — Medication 6: at 12:13

## 2023-05-06 RX ADMIN — MONTELUKAST 10 MILLIGRAM(S): 4 TABLET, CHEWABLE ORAL at 22:04

## 2023-05-06 RX ADMIN — Medication 3 MILLILITER(S): at 02:40

## 2023-05-06 RX ADMIN — FLUCONAZOLE 100 MILLIGRAM(S): 150 TABLET ORAL at 09:44

## 2023-05-06 RX ADMIN — Medication 60 MILLIGRAM(S): at 12:15

## 2023-05-06 RX ADMIN — SODIUM CHLORIDE 125 MILLILITER(S): 9 INJECTION INTRAMUSCULAR; INTRAVENOUS; SUBCUTANEOUS at 17:41

## 2023-05-06 RX ADMIN — APIXABAN 5 MILLIGRAM(S): 2.5 TABLET, FILM COATED ORAL at 22:03

## 2023-05-06 RX ADMIN — MAGNESIUM OXIDE 400 MG ORAL TABLET 400 MILLIGRAM(S): 241.3 TABLET ORAL at 17:35

## 2023-05-06 RX ADMIN — Medication 60 MILLIGRAM(S): at 05:10

## 2023-05-06 RX ADMIN — Medication 4 MILLILITER(S): at 15:26

## 2023-05-06 RX ADMIN — Medication 8: at 17:35

## 2023-05-06 RX ADMIN — POLYETHYLENE GLYCOL 3350 17 GRAM(S): 17 POWDER, FOR SOLUTION ORAL at 09:44

## 2023-05-06 RX ADMIN — Medication 0.5 MILLIGRAM(S): at 09:48

## 2023-05-06 RX ADMIN — Medication 3 MILLILITER(S): at 15:22

## 2023-05-06 RX ADMIN — Medication 10 MILLILITER(S): at 09:45

## 2023-05-06 RX ADMIN — Medication 0.5 MILLIGRAM(S): at 21:06

## 2023-05-06 RX ADMIN — ONDANSETRON 4 MILLIGRAM(S): 8 TABLET, FILM COATED ORAL at 13:13

## 2023-05-06 RX ADMIN — Medication 60 MILLIGRAM(S): at 17:42

## 2023-05-06 RX ADMIN — MEXILETINE HYDROCHLORIDE 200 MILLIGRAM(S): 150 CAPSULE ORAL at 05:10

## 2023-05-06 RX ADMIN — Medication 4 MILLILITER(S): at 02:42

## 2023-05-06 RX ADMIN — APIXABAN 5 MILLIGRAM(S): 2.5 TABLET, FILM COATED ORAL at 09:45

## 2023-05-06 RX ADMIN — Medication 3 MILLILITER(S): at 21:06

## 2023-05-06 RX ADMIN — MAGNESIUM OXIDE 400 MG ORAL TABLET 400 MILLIGRAM(S): 241.3 TABLET ORAL at 09:45

## 2023-05-06 NOTE — PROGRESS NOTE ADULT - ASSESSMENT
PROBLEMS:    Acute respiratory failure with hypoxia  Severe asthma  Acute bronchiolitis with parainfluenza type 3   Bronchiectasis  Tracheobronchomalacia  Oral thrush    PLAN:    IV solumedrol to 60MG Q6H  Aerosols with Duoneb/Budesonide  Sputum C+S, fungus  Mucomyst 10% TID  Chest PT/Smart Vest  Singulair  OOB  ID FU  DVT PROPHYLASIX

## 2023-05-06 NOTE — PROGRESS NOTE ADULT - SUBJECTIVE AND OBJECTIVE BOX
Subjective:    pat lying in bed, dry cough, no fever, no new complaint.    Home Medications:  acetylcysteine 10% inhalation solution: 2 milliliter(s) orally every 6 hours (04 May 2023 14:25)  apixaban 5 mg oral tablet: 1 tab(s) orally 2 times a day (04 May 2023 12:42)  Breo Ellipta 200 mcg-25 mcg/inh inhalation powder: 1 puff(s) inhaled once a day (04 May 2023 14:25)  budesonide 0.5 mg/2 mL inhalation suspension: 2 milliliter(s) inhaled 2 times a day (04 May 2023 12:42)  Crestor 5 mg oral tablet: 1 tab(s) orally once a day (at bedtime) (04 May 2023 12:42)  Diabetic Expectorant 100 mg/5 mL oral liquid: 10 milliliter(s) orally 4 times a day x 7 days (04 May 2023 14:25)  ferrous sulfate 325 mg (65 mg elemental iron) oral tablet: 1 tab(s) orally 2 times a day (04 May 2023 14:25)  HumaLOG KwikPen 100 units/mL injectable solution: Inject as per sliding scale: 100-150 = 5 units, 151-200 = 6 units, 201-250 = 7 units, 251-300 = 8 units, 301-350 = 9 units, 351-400 = 10 units, &gt; 400 = call md (04 May 2023 14:25)  Incruse Ellipta 62.5 mcg/inh inhalation powder: 1 puff(s) inhaled every 24 hours (04 May 2023 12:42)  insulin glargine 100 units/mL subcutaneous solution: 25 unit(s) subcutaneous once a day (at bedtime) (04 May 2023 12:42)  ipratropium-albuterol 0.5 mg-2.5 mg/3 mL inhalation solution: 3 milliliter(s) by nebulizer every 4 hours as needed for  shortness of breath and/or wheezing (04 May 2023 14:25)  ipratropium-albuterol 0.5 mg-2.5 mg/3 mL inhalation solution: 3 milliliter(s) inhaled every 6 hours (04 May 2023 12:42)  magnesium oxide 400 mg oral tablet: 1 tab(s) orally 2 times a day (04 May 2023 14:25)  Medrol 8 mg oral tablet: 1 tab(s) orally once a day (04 May 2023 12:42)  Merrem 500 mg intravenous injection: 500 milligram(s) intravenously every 12 hours x 5 days ***Course Not Complete*** (04 May 2023 14:25)  mexiletine 200 mg oral capsule: 1 cap(s) orally every 8 hours (04 May 2023 12:42)  MiraLax oral powder for reconstitution: 17 gram(s) orally once a day (04 May 2023 14:25)  omeprazole 20 mg oral delayed release tablet: 1 tab(s) orally once a day (04 May 2023 12:42)  ondansetron 4 mg oral tablet: 1 tab(s) orally every 6 hours, As Needed (04 May 2023 12:42)  saccharomyces boulardii lyo 250 mg oral capsule: 1 cap(s) orally 2 times a day (04 May 2023 12:42)  Senna Plus 50 mg-8.6 mg oral tablet: 2 tab(s) orally once a day (at bedtime) (04 May 2023 14:25)  sulfamethoxazole-trimethoprim 400 mg-80 mg oral tablet: 1 tab(s) orally once a day (04 May 2023 12:42)  traMADol 50 mg oral tablet: 1 tab(s) orally every 8 hours, As Needed (04 May 2023 12:42)    MEDICATIONS  (STANDING):  acetylcysteine 10%  Inhalation 4 milliLiter(s) Inhalation three times a day  albuterol/ipratropium for Nebulization 3 milliLiter(s) Nebulizer every 6 hours  albuterol/ipratropium for Nebulization 3 milliLiter(s) Nebulizer every 6 hours  apixaban 5 milliGRAM(s) Oral two times a day  buDESOnide    Inhalation Suspension 0.5 milliGRAM(s) Inhalation every 12 hours  dextrose 5%. 1000 milliLiter(s) (100 mL/Hr) IV Continuous <Continuous>  dextrose 5%. 1000 milliLiter(s) (50 mL/Hr) IV Continuous <Continuous>  dextrose 50% Injectable 25 Gram(s) IV Push once  dextrose 50% Injectable 12.5 Gram(s) IV Push once  dextrose 50% Injectable 25 Gram(s) IV Push once  fluconAZOLE   Tablet 100 milliGRAM(s) Oral daily  glucagon  Injectable 1 milliGRAM(s) IntraMuscular once  insulin glargine Injectable (LANTUS) 25 Unit(s) SubCutaneous at bedtime  insulin lispro (ADMELOG) corrective regimen sliding scale   SubCutaneous three times a day before meals  insulin lispro (ADMELOG) corrective regimen sliding scale   SubCutaneous at bedtime  magnesium oxide 400 milliGRAM(s) Oral two times a day with meals  methylPREDNISolone sodium succinate Injectable 60 milliGRAM(s) IV Push every 6 hours  mexiletine 200 milliGRAM(s) Oral every 8 hours  montelukast 10 milliGRAM(s) Oral at bedtime  polyethylene glycol 3350 17 Gram(s) Oral daily  sodium chloride 0.9%. 1000 milliLiter(s) (125 mL/Hr) IV Continuous <Continuous>    MEDICATIONS  (PRN):  acetaminophen     Tablet .. 650 milliGRAM(s) Oral every 6 hours PRN Temp greater or equal to 38C (100.4F), Mild Pain (1 - 3)  albuterol    90 MICROgram(s) HFA Inhaler 1 Puff(s) Inhalation four times a day PRN Shortness of Breath and/or Wheezing  albuterol    90 MICROgram(s) HFA Inhaler 2 Puff(s) Inhalation every 4 hours PRN Shortness of Breath  aluminum hydroxide/magnesium hydroxide/simethicone Suspension 30 milliLiter(s) Oral every 4 hours PRN Dyspepsia  benzonatate 100 milliGRAM(s) Oral every 8 hours PRN Cough  dextrose Oral Gel 15 Gram(s) Oral once PRN Blood Glucose LESS THAN 70 milliGRAM(s)/deciliter  guaifenesin/dextromethorphan Oral Liquid 10 milliLiter(s) Oral every 4 hours PRN Cough  melatonin 3 milliGRAM(s) Oral at bedtime PRN Insomnia  ondansetron Injectable 4 milliGRAM(s) IV Push every 8 hours PRN Nausea and/or Vomiting  traMADol 50 milliGRAM(s) Oral every 8 hours PRN Moderate Pain (4 - 6)      Allergies    iodine (Short breath; Swelling)  Avelox (Short breath; Pruritus)  shellfish (Anaphylaxis)  aspirin (Short breath)  Dilaudid (Short breath)  codeine (Short breath)  cefepime (Anaphylaxis)  penicillin (Anaphylaxis)  tetanus toxoid (Short breath)  Valium (Short breath)    Intolerances        Vital Signs Last 24 Hrs  T(C): 36.3 (06 May 2023 08:08), Max: 36.9 (05 May 2023 15:45)  T(F): 97.4 (06 May 2023 08:08), Max: 98.4 (05 May 2023 15:45)  HR: 82 (06 May 2023 08:08) (82 - 95)  BP: 143/70 (06 May 2023 08:08) (117/57 - 143/70)  BP(mean): --  RR: 18 (06 May 2023 08:08) (18 - 18)  SpO2: 100% (06 May 2023 08:08) (99% - 100%)    Parameters below as of 06 May 2023 08:08  Patient On (Oxygen Delivery Method): nasal cannula  O2 Flow (L/min): 2        PHYSICAL EXAMINATION:    NECK:  Supple. No lymphadenopathy. Jugular venous pressure not elevated. Carotids equal.   HEART:   The cardiac impulse has a normal quality. Reg., Nl S1 and S2.  There are no murmurs, rubs or gallops noted  CHEST:  Chest rhonchi to auscultation. Normal respiratory effort.  ABDOMEN:  Soft and nontender.   EXTREMITIES:  There is no edema.       LABS:                        11.8   8.07  )-----------( 181      ( 05 May 2023 06:21 )             42.0         137  |  107  |  12  ----------------------------<  248<H>  4.9   |  23  |  0.56    Ca    8.7      05 May 2023 06:21        Urinalysis Basic - ( 04 May 2023 13:31 )    Color: Yellow / Appearance: Clear / S.015 / pH: x  Gluc: x / Ketone: Small  / Bili: Negative / Urobili: Negative   Blood: x / Protein: Negative / Nitrite: Negative   Leuk Esterase: Negative / RBC: 6-10 /HPF / WBC 3-5 /HPF   Sq Epi: x / Non Sq Epi: x / Bacteria: Occasional        Culture - Sputum (collected 23 @ 21:53)  Source: .Sputum Sputum  Gram Stain (23 @ 07:44):    Few polymorphonuclear leukocytes per low power field    Few Squamous epithelial cells per low power field    Moderate Gram Positive Rods seen per oil power field      CT Chest No Cont (23 @ 13:36) >  IMPRESSION:    Increased infectious or inflammatory bronchitis/bronchiolitis since   2023.

## 2023-05-06 NOTE — PROGRESS NOTE ADULT - SUBJECTIVE AND OBJECTIVE BOX
Date of service: 23 @ 08:37    Lying in bed in NAD  Weak looking  Hs dry cough  No fever    ROS: no fever or chills; denies dizziness, no HA, no abdominal pain, no diarrhea or constipation; no dysuria, no legs pain, no rashes    MEDICATIONS  (STANDING):  acetylcysteine 10%  Inhalation 4 milliLiter(s) Inhalation three times a day  albuterol/ipratropium for Nebulization 3 milliLiter(s) Nebulizer every 6 hours  albuterol/ipratropium for Nebulization 3 milliLiter(s) Nebulizer every 6 hours  apixaban 5 milliGRAM(s) Oral two times a day  buDESOnide    Inhalation Suspension 0.5 milliGRAM(s) Inhalation every 12 hours  dextrose 5%. 1000 milliLiter(s) (100 mL/Hr) IV Continuous <Continuous>  dextrose 5%. 1000 milliLiter(s) (50 mL/Hr) IV Continuous <Continuous>  dextrose 50% Injectable 25 Gram(s) IV Push once  dextrose 50% Injectable 12.5 Gram(s) IV Push once  dextrose 50% Injectable 25 Gram(s) IV Push once  fluconAZOLE   Tablet 100 milliGRAM(s) Oral daily  glucagon  Injectable 1 milliGRAM(s) IntraMuscular once  insulin glargine Injectable (LANTUS) 25 Unit(s) SubCutaneous at bedtime  insulin lispro (ADMELOG) corrective regimen sliding scale   SubCutaneous three times a day before meals  insulin lispro (ADMELOG) corrective regimen sliding scale   SubCutaneous at bedtime  magnesium oxide 400 milliGRAM(s) Oral two times a day with meals  methylPREDNISolone sodium succinate Injectable 60 milliGRAM(s) IV Push every 6 hours  mexiletine 200 milliGRAM(s) Oral every 8 hours  montelukast 10 milliGRAM(s) Oral at bedtime  polyethylene glycol 3350 17 Gram(s) Oral daily  sodium chloride 0.9%. 1000 milliLiter(s) (125 mL/Hr) IV Continuous <Continuous>    Vital Signs Last 24 Hrs  T(C): 36.3 (06 May 2023 08:08), Max: 36.9 (05 May 2023 15:45)  T(F): 97.4 (06 May 2023 08:08), Max: 98.4 (05 May 2023 15:45)  HR: 82 (06 May 2023 08:08) (82 - 95)  BP: 143/70 (06 May 2023 08:08) (117/57 - 143/70)  BP(mean): --  RR: 18 (06 May 2023 08:08) (18 - 18)  SpO2: 100% (06 May 2023 08:08) (99% - 100%)    Parameters below as of 06 May 2023 08:08  Patient On (Oxygen Delivery Method): nasal cannula  O2 Flow (L/min): 2     Physical exam:    Constitutional:  No acute distress  HEENT: NC/AT, EOMI, PERRLA, conjunctivae clear; ears and nose atraumatic  Neck: supple; thyroid not palpable  Back: no tenderness  Respiratory: respiratory effort normal; few crackles at bases  Cardiovascular: S1S2 regular, no murmurs  Abdomen: soft, not tender, not distended, positive BS; no liver or spleen organomegaly  Genitourinary: no suprapubic tenderness  Lymphatic: no LN palpable  Musculoskeletal: no muscle tenderness, no joint swelling or tenderness  Extremities: no pedal edema  Neurological/ Psychiatric: AxOx3, judgement and insight normal; moving all extremities  Skin: no rashes; no palpable lesions    Labs: reviewed                        11.8   8.07  )-----------( 181      ( 05 May 2023 06:21 )             42.0     05-05    137  |  107  |  12  ----------------------------<  248<H>  4.9   |  23  |  0.56    Ca    8.7      05 May 2023 06:21    TPro  7.6  /  Alb  3.2<L>  /  TBili  0.3  /  DBili  x   /  AST  27  /  ALT  21  /  AlkPhos  104  05-04     LIVER FUNCTIONS - ( 04 May 2023 09:51 )  Alb: 3.2 g/dL / Pro: 7.6 gm/dL / ALK PHOS: 104 U/L / ALT: 21 U/L / AST: 27 U/L / GGT: x           Urinalysis Basic - ( 04 May 2023 13:31 )    Color: Yellow / Appearance: Clear / S.015 / pH: x  Gluc: x / Ketone: Small  / Bili: Negative / Urobili: Negative   Blood: x / Protein: Negative / Nitrite: Negative   Leuk Esterase: Negative / RBC: 6-10 /HPF / WBC 3-5 /HPF   Sq Epi: x / Non Sq Epi: x / Bacteria: Occasional    Parainfluenza 3 ( @ 09:51)  Detected      Culture - Sputum (collected 05 May 2023 21:53)  Source: .Sputum Sputum  Gram Stain (06 May 2023 07:44):    Few polymorphonuclear leukocytes per low power field    Few Squamous epithelial cells per low power field    Moderate Gram Positive Rods seen per oil power field    Culture - Blood (collected 04 May 2023 10:15)  Source: .Blood None  Preliminary Report (05 May 2023 16:01):    No growth to date.    Culture - Blood (collected 04 May 2023 09:51)  Source: .Blood Blood-Peripheral  Preliminary Report (05 May 2023 16:01):    No growth to date.    Radiology: all available radiological tests reviewed    < from: CT Chest No Cont (23 @ 13:36) >  Increased infectious or inflammatory bronchitis/bronchiolitis since 2023.  < end of copied text >      Advanced directives addressed: full resuscitation

## 2023-05-06 NOTE — PROGRESS NOTE ADULT - SUBJECTIVE AND OBJECTIVE BOX
5/5: sob better  no cp  on O2 2 L nc    5/6: says that she is feeling less terrible today; sob better  asking for glucerna 3 times a day, added    PHYSICAL EXAM:    Vital Signs Last 24 Hrs  T(C): 36.3 (06 May 2023 08:08), Max: 36.9 (05 May 2023 15:45)  T(F): 97.4 (06 May 2023 08:08), Max: 98.4 (05 May 2023 15:45)  HR: 82 (06 May 2023 08:08) (82 - 95)  BP: 143/70 (06 May 2023 08:08) (117/57 - 143/70)  BP(mean): --  RR: 18 (06 May 2023 08:08) (18 - 18)  SpO2: 100% (06 May 2023 08:08) (99% - 100%)    Parameters below as of 06 May 2023 08:08  Patient On (Oxygen Delivery Method): nasal cannula  O2 Flow (L/min): 2      Constitutional: Weak appearing  HEENT: Atraumatic, EBER,   Respiratory: Breath Sounds normal, no rhonchi/wheeze  Cardiovascular: N S1S2;   Gastrointestinal: Abdomen soft, non tender, Bowel Sounds present  Extremities: No edema, peripheral pulses present  Neurological: AAO x 3, no gross focal motor deficits  Skin: Non cellulitic, no rash, ulcers  Lymph Nodes: No lymphadenopathy noted  Back: No CVA tenderness   Musculoskeletal: non tender  Breasts: Deferred  Genitourinary: deferred  Rectal: Deferred    All Labs/EKG/Radiology/Meds reviewed by me                          11.8   8.07  )-----------( 181      ( 05 May 2023 06:21 )             42.0       CBC Full  -  ( 05 May 2023 06:21 )  WBC Count : 8.07 K/uL  RBC Count : 5.51 M/uL  Hemoglobin : 11.8 g/dL  Hematocrit : 42.0 %  Platelet Count - Automated : 181 K/uL  Mean Cell Volume : 76.2 fl  Mean Cell Hemoglobin : 21.4 pg  Mean Cell Hemoglobin Concentration : 28.1 gm/dL  Auto Neutrophil # : x  Auto Lymphocyte # : x  Auto Monocyte # : x  Auto Eosinophil # : x  Auto Basophil # : x  Auto Neutrophil % : x  Auto Lymphocyte % : x  Auto Monocyte % : x    Culture - Sputum (collected 05 May 2023 21:53)  Source: .Sputum Sputum  Gram Stain (06 May 2023 07:44):    Few polymorphonuclear leukocytes per low power field    Few Squamous epithelial cells per low power field    Moderate Gram Positive Rods seen per oil power field    Culture - Urine (collected 04 May 2023 13:31)  Source: Clean Catch Clean Catch (Midstream)  Final Report (06 May 2023 10:42):    <10,000 CFU/mL Normal Urogenital Jessica    Culture - Blood (collected 04 May 2023 10:15)  Source: .Blood None  Preliminary Report (05 May 2023 16:01):    No growth to date.    Culture - Blood (collected 04 May 2023 09:51)  Source: .Blood Blood-Peripheral  Preliminary Report (05 May 2023 16:01):    No growth to date.    Auto Eosinophil % : x  Auto Basophil % : x          137  |  107  |  12  ----------------------------<  248<H>  4.9   |  23  |  0.56    Ca    8.7      05 May 2023 06:21    TPro  7.6  /  Alb  3.2<L>  /  TBili  0.3  /  DBili  x   /  AST  27  /  ALT  21  /  AlkPhos  104  05-04      LIVER FUNCTIONS - ( 04 May 2023 09:51 )  Alb: 3.2 g/dL / Pro: 7.6 gm/dL / ALK PHOS: 104 U/L / ALT: 21 U/L / AST: 27 U/L / GGT: x                       Urinalysis Basic - ( 04 May 2023 13:31 )    Color: Yellow / Appearance: Clear / S.015 / pH: x  Gluc: x / Ketone: Small  / Bili: Negative / Urobili: Negative   Blood: x / Protein: Negative / Nitrite: Negative   Leuk Esterase: Negative / RBC: 6-10 /HPF / WBC 3-5 /HPF   Sq Epi: x / Non Sq Epi: x / Bacteria: Occasional        < from: CT Chest No Cont (23 @ 13:36) >  Increased infectious or inflammatory bronchitis/bronchiolitis since   2023.    < end of copied text >      MEDICATIONS  (STANDING):  acetylcysteine 10%  Inhalation 4 milliLiter(s) Inhalation three times a day  albuterol/ipratropium for Nebulization 3 milliLiter(s) Nebulizer every 6 hours  albuterol/ipratropium for Nebulization 3 milliLiter(s) Nebulizer every 6 hours  apixaban 5 milliGRAM(s) Oral two times a day  buDESOnide    Inhalation Suspension 0.5 milliGRAM(s) Inhalation every 12 hours  dextrose 5%. 1000 milliLiter(s) (100 mL/Hr) IV Continuous <Continuous>  dextrose 5%. 1000 milliLiter(s) (50 mL/Hr) IV Continuous <Continuous>  dextrose 50% Injectable 25 Gram(s) IV Push once  dextrose 50% Injectable 12.5 Gram(s) IV Push once  dextrose 50% Injectable 25 Gram(s) IV Push once  fluconAZOLE   Tablet 100 milliGRAM(s) Oral daily  glucagon  Injectable 1 milliGRAM(s) IntraMuscular once  insulin glargine Injectable (LANTUS) 25 Unit(s) SubCutaneous at bedtime  insulin lispro (ADMELOG) corrective regimen sliding scale   SubCutaneous three times a day before meals  insulin lispro (ADMELOG) corrective regimen sliding scale   SubCutaneous at bedtime  magnesium oxide 400 milliGRAM(s) Oral two times a day with meals  methylPREDNISolone sodium succinate Injectable 60 milliGRAM(s) IV Push every 6 hours  mexiletine 200 milliGRAM(s) Oral every 8 hours  montelukast 10 milliGRAM(s) Oral at bedtime  polyethylene glycol 3350 17 Gram(s) Oral daily  sodium chloride 0.9%. 1000 milliLiter(s) (125 mL/Hr) IV Continuous <Continuous>    MEDICATIONS  (PRN):  acetaminophen     Tablet .. 650 milliGRAM(s) Oral every 6 hours PRN Temp greater or equal to 38C (100.4F), Mild Pain (1 - 3)  albuterol    90 MICROgram(s) HFA Inhaler 1 Puff(s) Inhalation four times a day PRN Shortness of Breath and/or Wheezing  albuterol    90 MICROgram(s) HFA Inhaler 2 Puff(s) Inhalation every 4 hours PRN Shortness of Breath  aluminum hydroxide/magnesium hydroxide/simethicone Suspension 30 milliLiter(s) Oral every 4 hours PRN Dyspepsia  benzonatate 100 milliGRAM(s) Oral every 8 hours PRN Cough  dextrose Oral Gel 15 Gram(s) Oral once PRN Blood Glucose LESS THAN 70 milliGRAM(s)/deciliter  guaifenesin/dextromethorphan Oral Liquid 10 milliLiter(s) Oral every 4 hours PRN Cough  melatonin 3 milliGRAM(s) Oral at bedtime PRN Insomnia  ondansetron Injectable 4 milliGRAM(s) IV Push every 8 hours PRN Nausea and/or Vomiting  traMADol 50 milliGRAM(s) Oral every 8 hours PRN Moderate Pain (4 - 6)     5/5: sob better  no cp  on O2 2 L nc    5/6: says that she is feeling less terrible today; sob better  asking for glucerna 3 times a day, added  refused to take diabetic diet, wants regular diet    PHYSICAL EXAM:    Vital Signs Last 24 Hrs  T(C): 36.3 (06 May 2023 08:08), Max: 36.9 (05 May 2023 15:45)  T(F): 97.4 (06 May 2023 08:08), Max: 98.4 (05 May 2023 15:45)  HR: 82 (06 May 2023 08:08) (82 - 95)  BP: 143/70 (06 May 2023 08:08) (117/57 - 143/70)  BP(mean): --  RR: 18 (06 May 2023 08:08) (18 - 18)  SpO2: 100% (06 May 2023 08:08) (99% - 100%)    Parameters below as of 06 May 2023 08:08  Patient On (Oxygen Delivery Method): nasal cannula  O2 Flow (L/min): 2      Constitutional: Weak appearing  HEENT: Atraumatic, EBER,   Respiratory: Breath Sounds normal, no rhonchi/wheeze  Cardiovascular: N S1S2;   Gastrointestinal: Abdomen soft, non tender, Bowel Sounds present  Extremities: No edema, peripheral pulses present  Neurological: AAO x 3, no gross focal motor deficits  Skin: Non cellulitic, no rash, ulcers  Lymph Nodes: No lymphadenopathy noted  Back: No CVA tenderness   Musculoskeletal: non tender  Breasts: Deferred  Genitourinary: deferred  Rectal: Deferred    All Labs/EKG/Radiology/Meds reviewed by me                          11.8   8.07  )-----------( 181      ( 05 May 2023 06:21 )             42.0       CBC Full  -  ( 05 May 2023 06:21 )  WBC Count : 8.07 K/uL  RBC Count : 5.51 M/uL  Hemoglobin : 11.8 g/dL  Hematocrit : 42.0 %  Platelet Count - Automated : 181 K/uL  Mean Cell Volume : 76.2 fl  Mean Cell Hemoglobin : 21.4 pg  Mean Cell Hemoglobin Concentration : 28.1 gm/dL  Auto Neutrophil # : x  Auto Lymphocyte # : x  Auto Monocyte # : x  Auto Eosinophil # : x  Auto Basophil # : x  Auto Neutrophil % : x  Auto Lymphocyte % : x  Auto Monocyte % : x    Culture - Sputum (collected 05 May 2023 21:53)  Source: .Sputum Sputum  Gram Stain (06 May 2023 07:44):    Few polymorphonuclear leukocytes per low power field    Few Squamous epithelial cells per low power field    Moderate Gram Positive Rods seen per oil power field    Culture - Urine (collected 04 May 2023 13:31)  Source: Clean Catch Clean Catch (Midstream)  Final Report (06 May 2023 10:42):    <10,000 CFU/mL Normal Urogenital Jessica    Culture - Blood (collected 04 May 2023 10:15)  Source: .Blood None  Preliminary Report (05 May 2023 16:01):    No growth to date.    Culture - Blood (collected 04 May 2023 09:51)  Source: .Blood Blood-Peripheral  Preliminary Report (05 May 2023 16:01):    No growth to date.    Auto Eosinophil % : x  Auto Basophil % : x          137  |  107  |  12  ----------------------------<  248<H>  4.9   |  23  |  0.56    Ca    8.7      05 May 2023 06:21    TPro  7.6  /  Alb  3.2<L>  /  TBili  0.3  /  DBili  x   /  AST  27  /  ALT  21  /  AlkPhos  104  05-04      LIVER FUNCTIONS - ( 04 May 2023 09:51 )  Alb: 3.2 g/dL / Pro: 7.6 gm/dL / ALK PHOS: 104 U/L / ALT: 21 U/L / AST: 27 U/L / GGT: x                       Urinalysis Basic - ( 04 May 2023 13:31 )    Color: Yellow / Appearance: Clear / S.015 / pH: x  Gluc: x / Ketone: Small  / Bili: Negative / Urobili: Negative   Blood: x / Protein: Negative / Nitrite: Negative   Leuk Esterase: Negative / RBC: 6-10 /HPF / WBC 3-5 /HPF   Sq Epi: x / Non Sq Epi: x / Bacteria: Occasional        < from: CT Chest No Cont (23 @ 13:36) >  Increased infectious or inflammatory bronchitis/bronchiolitis since   2023.    < end of copied text >      MEDICATIONS  (STANDING):  acetylcysteine 10%  Inhalation 4 milliLiter(s) Inhalation three times a day  albuterol/ipratropium for Nebulization 3 milliLiter(s) Nebulizer every 6 hours  albuterol/ipratropium for Nebulization 3 milliLiter(s) Nebulizer every 6 hours  apixaban 5 milliGRAM(s) Oral two times a day  buDESOnide    Inhalation Suspension 0.5 milliGRAM(s) Inhalation every 12 hours  dextrose 5%. 1000 milliLiter(s) (100 mL/Hr) IV Continuous <Continuous>  dextrose 5%. 1000 milliLiter(s) (50 mL/Hr) IV Continuous <Continuous>  dextrose 50% Injectable 25 Gram(s) IV Push once  dextrose 50% Injectable 12.5 Gram(s) IV Push once  dextrose 50% Injectable 25 Gram(s) IV Push once  fluconAZOLE   Tablet 100 milliGRAM(s) Oral daily  glucagon  Injectable 1 milliGRAM(s) IntraMuscular once  insulin glargine Injectable (LANTUS) 25 Unit(s) SubCutaneous at bedtime  insulin lispro (ADMELOG) corrective regimen sliding scale   SubCutaneous three times a day before meals  insulin lispro (ADMELOG) corrective regimen sliding scale   SubCutaneous at bedtime  magnesium oxide 400 milliGRAM(s) Oral two times a day with meals  methylPREDNISolone sodium succinate Injectable 60 milliGRAM(s) IV Push every 6 hours  mexiletine 200 milliGRAM(s) Oral every 8 hours  montelukast 10 milliGRAM(s) Oral at bedtime  polyethylene glycol 3350 17 Gram(s) Oral daily  sodium chloride 0.9%. 1000 milliLiter(s) (125 mL/Hr) IV Continuous <Continuous>    MEDICATIONS  (PRN):  acetaminophen     Tablet .. 650 milliGRAM(s) Oral every 6 hours PRN Temp greater or equal to 38C (100.4F), Mild Pain (1 - 3)  albuterol    90 MICROgram(s) HFA Inhaler 1 Puff(s) Inhalation four times a day PRN Shortness of Breath and/or Wheezing  albuterol    90 MICROgram(s) HFA Inhaler 2 Puff(s) Inhalation every 4 hours PRN Shortness of Breath  aluminum hydroxide/magnesium hydroxide/simethicone Suspension 30 milliLiter(s) Oral every 4 hours PRN Dyspepsia  benzonatate 100 milliGRAM(s) Oral every 8 hours PRN Cough  dextrose Oral Gel 15 Gram(s) Oral once PRN Blood Glucose LESS THAN 70 milliGRAM(s)/deciliter  guaifenesin/dextromethorphan Oral Liquid 10 milliLiter(s) Oral every 4 hours PRN Cough  melatonin 3 milliGRAM(s) Oral at bedtime PRN Insomnia  ondansetron Injectable 4 milliGRAM(s) IV Push every 8 hours PRN Nausea and/or Vomiting  traMADol 50 milliGRAM(s) Oral every 8 hours PRN Moderate Pain (4 - 6)

## 2023-05-06 NOTE — PROGRESS NOTE ADULT - ASSESSMENT
72F w/PMH TIA, DVT, Colorectal CA s/p tx, Tracheobronchomalacia s/p Tracheobronchoplasty 2016 (no further evidence), adrenal insufficiency on steroids, COPD/Asthma, PAfib/AC, T2DM, recent admit 2 mos ago,  presents from NH for fever x2 days.  +worsening productive cough and sob.   In ED, MD d/w ID, rec to remove picc line (to be done by ED), given vanco iV, 1L ivf, duoneb, +febrile, CT chest: Increased infectious or inflammatory bronchitis/bronchiolitis since   4/5/2023.      #Sepsis as evidenced by hypotension, fever : No PNA  Parainfluenza Bonchiolitis  # Hx of Bronchiectasis  # Oral thrush  #Asthma exacerbation  - IVF  - ICU eval for hypotension appreciated; BP responded to iiv fluids  - IV solumedrol 40mg bid  - duonebs, alb inh prn  - pulm cs  - ID cs  - d/c meropenem/vanco  - antitussives  -monitor labs, vitals  increase solumedrol to 60 mg q 6 hrs  add diflucan  smart vest for chest PTx  sputum gram stain +, await cx results. No ABX at this time per ID.     #T2DM  - resume home dose insulin  - start ssi  - monitor FS  - check HA1c  change diet to [facility] diet  Glucerna 1 can  tid    #Parox Afib, TIA  - c/w eliquis    # DVT PPX: on Eliquis      poc discussed with pt, team,  Dr. Krish MEHTA and advance care planning meeting done with the patient. She wishes to be fully resuscitated and mechanically ventilated if need arises. There are no limitations of any sort in her medical care and management. She is FULL CODE.   72F w/PMH TIA, DVT, Colorectal CA s/p tx, Tracheobronchomalacia s/p Tracheobronchoplasty 2016 (no further evidence), adrenal insufficiency on steroids, COPD/Asthma, PAfib/AC, T2DM, recent admit 2 mos ago,  presents from NH for fever x2 days.  +worsening productive cough and sob.   In ED, MD d/w ID, rec to remove picc line (to be done by ED), given vanco iV, 1L ivf, duoneb, +febrile, CT chest: Increased infectious or inflammatory bronchitis/bronchiolitis since   4/5/2023.      #Sepsis as evidenced by hypotension, fever : No PNA  Parainfluenza Bonchiolitis  # Hx of Bronchiectasis  # Oral thrush  #Asthma exacerbation  - IVF  - ICU eval for hypotension appreciated; BP responded to iiv fluids  - IV solumedrol 40mg bid  - duonebs, alb inh prn  - pulm cs  - ID cs  - d/c meropenem/vanco  - antitussives  -monitor labs, vitals  increase solumedrol to 60 mg q 6 hrs  add diflucan  smart vest for chest PTx  sputum gram stain +, await cx results. No ABX at this time per ID.     #T2DM  - resume home dose insulin  - start ssi  - monitor FS  - check HA1c  changed diet to [facility] diet but refusing it and asking for regular diet.   Glucerna 1 can  tid    #Parox Afib, TIA  - c/w eliquis    # DVT PPX: on Eliquis      poc discussed with pt, team,  Dr. Krish MEHTA and advance care planning meeting done with the patient. She wishes to be fully resuscitated and mechanically ventilated if need arises. There are no limitations of any sort in her medical care and management. She is FULL CODE.

## 2023-05-06 NOTE — PROGRESS NOTE ADULT - ASSESSMENT
73 y/o Female with h/o TIA, DVT, Colorectal CA s/p tx, Tracheobronchomalacia s/p tracheobronchoplasty 2016, adrenal insufficiency on steroids, COPD/Asthma, Afib/AC, T2DM was admitted on 5/4 from NH for fever x 2 days. At the NH she had PICC line placed and started on meropenem empirically 2 days PTA, but continues to have fever and worsening productive cough and SOB. She endorses dry heaves and vomiting  last night, denies any diarrhea/abd pain. In ER, pt found to be hypotensive and received vancomycin IV.     1. Febrile syndrome improving. Acute bronchiolitis with parainfluenza type 3. Oral thrush. Allergy to PCN with anaphylaxis.   -BC x 2 noted  -CT chest noted - no infiltrates noted  -given meropenem for several days without improvement  -sputum c/s collected  -on fluconazole 100 mg PO qd # 2  -tolerating abx well so far; no side effects noted  -continue antifungal coverage   -respiratory care  -pulmonary evaluation appreciated  -monitor temps  -f/u CBC  -supportive care  2. Other issues:   -care per medicine

## 2023-05-07 LAB
CULTURE RESULTS: SIGNIFICANT CHANGE UP
GLUCOSE BLDC GLUCOMTR-MCNC: 219 MG/DL — HIGH (ref 70–99)
GLUCOSE BLDC GLUCOMTR-MCNC: 269 MG/DL — HIGH (ref 70–99)
GLUCOSE BLDC GLUCOMTR-MCNC: 284 MG/DL — HIGH (ref 70–99)
GLUCOSE BLDC GLUCOMTR-MCNC: 294 MG/DL — HIGH (ref 70–99)
SPECIMEN SOURCE: SIGNIFICANT CHANGE UP

## 2023-05-07 PROCEDURE — 99232 SBSQ HOSP IP/OBS MODERATE 35: CPT

## 2023-05-07 RX ORDER — NYSTATIN 500MM UNIT
500000 POWDER (EA) MISCELLANEOUS EVERY 6 HOURS
Refills: 0 | Status: DISCONTINUED | OUTPATIENT
Start: 2023-05-07 | End: 2023-05-22

## 2023-05-07 RX ORDER — PANTOPRAZOLE SODIUM 20 MG/1
40 TABLET, DELAYED RELEASE ORAL
Refills: 0 | Status: DISCONTINUED | OUTPATIENT
Start: 2023-05-07 | End: 2023-05-16

## 2023-05-07 RX ORDER — INSULIN GLARGINE 100 [IU]/ML
30 INJECTION, SOLUTION SUBCUTANEOUS AT BEDTIME
Refills: 0 | Status: DISCONTINUED | OUTPATIENT
Start: 2023-05-07 | End: 2023-05-17

## 2023-05-07 RX ADMIN — Medication 30 MILLILITER(S): at 22:35

## 2023-05-07 RX ADMIN — Medication 60 MILLIGRAM(S): at 12:45

## 2023-05-07 RX ADMIN — MAGNESIUM OXIDE 400 MG ORAL TABLET 400 MILLIGRAM(S): 241.3 TABLET ORAL at 09:25

## 2023-05-07 RX ADMIN — APIXABAN 5 MILLIGRAM(S): 2.5 TABLET, FILM COATED ORAL at 09:22

## 2023-05-07 RX ADMIN — Medication 3 MILLILITER(S): at 20:36

## 2023-05-07 RX ADMIN — FLUCONAZOLE 100 MILLIGRAM(S): 150 TABLET ORAL at 09:22

## 2023-05-07 RX ADMIN — Medication 60 MILLIGRAM(S): at 23:49

## 2023-05-07 RX ADMIN — MONTELUKAST 10 MILLIGRAM(S): 4 TABLET, CHEWABLE ORAL at 22:31

## 2023-05-07 RX ADMIN — Medication 6: at 17:23

## 2023-05-07 RX ADMIN — Medication 6: at 12:45

## 2023-05-07 RX ADMIN — INSULIN GLARGINE 30 UNIT(S): 100 INJECTION, SOLUTION SUBCUTANEOUS at 22:29

## 2023-05-07 RX ADMIN — MEXILETINE HYDROCHLORIDE 200 MILLIGRAM(S): 150 CAPSULE ORAL at 12:46

## 2023-05-07 RX ADMIN — Medication 4: at 08:31

## 2023-05-07 RX ADMIN — Medication 30 MILLILITER(S): at 10:21

## 2023-05-07 RX ADMIN — SODIUM CHLORIDE 125 MILLILITER(S): 9 INJECTION INTRAMUSCULAR; INTRAVENOUS; SUBCUTANEOUS at 01:30

## 2023-05-07 RX ADMIN — Medication 60 MILLIGRAM(S): at 05:51

## 2023-05-07 RX ADMIN — Medication 500000 UNIT(S): at 17:23

## 2023-05-07 RX ADMIN — Medication 0.5 MILLIGRAM(S): at 20:36

## 2023-05-07 RX ADMIN — MAGNESIUM OXIDE 400 MG ORAL TABLET 400 MILLIGRAM(S): 241.3 TABLET ORAL at 17:23

## 2023-05-07 RX ADMIN — Medication 0.5 MILLIGRAM(S): at 15:17

## 2023-05-07 RX ADMIN — Medication 30 MILLILITER(S): at 05:54

## 2023-05-07 RX ADMIN — Medication 3 MILLILITER(S): at 01:54

## 2023-05-07 RX ADMIN — Medication 30 MILLILITER(S): at 14:05

## 2023-05-07 RX ADMIN — Medication 3 MILLILITER(S): at 15:18

## 2023-05-07 RX ADMIN — POLYETHYLENE GLYCOL 3350 17 GRAM(S): 17 POWDER, FOR SOLUTION ORAL at 09:22

## 2023-05-07 RX ADMIN — Medication 60 MILLIGRAM(S): at 17:23

## 2023-05-07 RX ADMIN — APIXABAN 5 MILLIGRAM(S): 2.5 TABLET, FILM COATED ORAL at 22:31

## 2023-05-07 RX ADMIN — Medication 4 MILLILITER(S): at 16:09

## 2023-05-07 RX ADMIN — MEXILETINE HYDROCHLORIDE 200 MILLIGRAM(S): 150 CAPSULE ORAL at 22:30

## 2023-05-07 RX ADMIN — Medication 2: at 22:30

## 2023-05-07 RX ADMIN — MEXILETINE HYDROCHLORIDE 200 MILLIGRAM(S): 150 CAPSULE ORAL at 05:50

## 2023-05-07 RX ADMIN — PANTOPRAZOLE SODIUM 40 MILLIGRAM(S): 20 TABLET, DELAYED RELEASE ORAL at 14:03

## 2023-05-07 RX ADMIN — Medication 4 MILLILITER(S): at 10:38

## 2023-05-07 RX ADMIN — Medication 500000 UNIT(S): at 23:49

## 2023-05-07 RX ADMIN — Medication 4 MILLILITER(S): at 20:36

## 2023-05-07 RX ADMIN — Medication 3 MILLILITER(S): at 10:39

## 2023-05-07 NOTE — PROGRESS NOTE ADULT - SUBJECTIVE AND OBJECTIVE BOX
5/5: sob better  no cp  on O2 2 L nc    6: says that she is feeling less terrible today; sob better  asking for glucerna 3 times a day, added  refused to take diabetic diet, wants regular diet    : feeling better  Off O2; 97% room air    PHYSICAL EXAM:    Vital Signs Last 24 Hrs  T(C): 36.3 (07 May 2023 08:06), Max: 37.2 (06 May 2023 15:29)  T(F): 97.3 (07 May 2023 08:06), Max: 99 (06 May 2023 15:29)  HR: 68 (07 May 2023 08:06) (68 - 82)  BP: 114/69 (07 May 2023 08:06) (114/69 - 146/78)  BP(mean): --  RR: 18 (07 May 2023 08:06) (18 - 18)  SpO2: 97% (07 May 2023 08:06) (95% - 97%)    Parameters below as of 07 May 2023 08:06  Patient On (Oxygen Delivery Method): room air          Constitutional: Weak appearing  HEENT: Atraumatic, EBER,   Respiratory: Breath Sounds normal, no rhonchi/wheeze  Cardiovascular: N S1S2;   Gastrointestinal: Abdomen soft, non tender, Bowel Sounds present  Extremities: No edema, peripheral pulses present  Neurological: AAO x 3, no gross focal motor deficits  Skin: Non cellulitic, no rash, ulcers  Lymph Nodes: No lymphadenopathy noted  Back: No CVA tenderness   Musculoskeletal: non tender  Breasts: Deferred  Genitourinary: deferred  Rectal: Deferred    All Labs/EKG/Radiology/Meds reviewed by me                          11.8   8.07  )-----------( 181      ( 05 May 2023 06:21 )             42.0       CBC Full  -  ( 05 May 2023 06:21 )  WBC Count : 8.07 K/uL  RBC Count : 5.51 M/uL  Hemoglobin : 11.8 g/dL  Hematocrit : 42.0 %  Platelet Count - Automated : 181 K/uL  Mean Cell Volume : 76.2 fl  Mean Cell Hemoglobin : 21.4 pg  Mean Cell Hemoglobin Concentration : 28.1 gm/dL  Auto Neutrophil # : x  Auto Lymphocyte # : x  Auto Monocyte # : x  Auto Eosinophil # : x  Auto Basophil # : x  Auto Neutrophil % : x  Auto Lymphocyte % : x  Auto Monocyte % : x    Culture - Sputum (collected 05 May 2023 21:53)  Source: .Sputum Sputum  Gram Stain (06 May 2023 07:44):    Few polymorphonuclear leukocytes per low power field    Few Squamous epithelial cells per low power field    Moderate Gram Positive Rods seen per oil power field    Culture - Urine (collected 04 May 2023 13:31)  Source: Clean Catch Clean Catch (Midstream)  Final Report (06 May 2023 10:42):    <10,000 CFU/mL Normal Urogenital Jessica    Culture - Blood (collected 04 May 2023 10:15)  Source: .Blood None  Preliminary Report (05 May 2023 16:01):    No growth to date.    Culture - Blood (collected 04 May 2023 09:51)  Source: .Blood Blood-Peripheral  Preliminary Report (05 May 2023 16:01):    No growth to date.    Auto Eosinophil % : x  Auto Basophil % : x          137  |  107  |  12  ----------------------------<  248<H>  4.9   |  23  |  0.56    Ca    8.7      05 May 2023 06:21    TPro  7.6  /  Alb  3.2<L>  /  TBili  0.3  /  DBili  x   /  AST  27  /  ALT  21  /  AlkPhos  104  05-04      LIVER FUNCTIONS - ( 04 May 2023 09:51 )  Alb: 3.2 g/dL / Pro: 7.6 gm/dL / ALK PHOS: 104 U/L / ALT: 21 U/L / AST: 27 U/L / GGT: x                       Urinalysis Basic - ( 04 May 2023 13:31 )    Color: Yellow / Appearance: Clear / S.015 / pH: x  Gluc: x / Ketone: Small  / Bili: Negative / Urobili: Negative   Blood: x / Protein: Negative / Nitrite: Negative   Leuk Esterase: Negative / RBC: 6-10 /HPF / WBC 3-5 /HPF   Sq Epi: x / Non Sq Epi: x / Bacteria: Occasional        < from: CT Chest No Cont (23 @ 13:36) >  Increased infectious or inflammatory bronchitis/bronchiolitis since   2023.    < end of copied text >      MEDICATIONS  (STANDING):  acetylcysteine 10%  Inhalation 4 milliLiter(s) Inhalation three times a day  albuterol/ipratropium for Nebulization 3 milliLiter(s) Nebulizer every 6 hours  albuterol/ipratropium for Nebulization 3 milliLiter(s) Nebulizer every 6 hours  apixaban 5 milliGRAM(s) Oral two times a day  buDESOnide    Inhalation Suspension 0.5 milliGRAM(s) Inhalation every 12 hours  dextrose 5%. 1000 milliLiter(s) (100 mL/Hr) IV Continuous <Continuous>  dextrose 5%. 1000 milliLiter(s) (50 mL/Hr) IV Continuous <Continuous>  dextrose 50% Injectable 25 Gram(s) IV Push once  dextrose 50% Injectable 12.5 Gram(s) IV Push once  dextrose 50% Injectable 25 Gram(s) IV Push once  fluconAZOLE   Tablet 100 milliGRAM(s) Oral daily  glucagon  Injectable 1 milliGRAM(s) IntraMuscular once  insulin glargine Injectable (LANTUS) 25 Unit(s) SubCutaneous at bedtime  insulin lispro (ADMELOG) corrective regimen sliding scale   SubCutaneous three times a day before meals  insulin lispro (ADMELOG) corrective regimen sliding scale   SubCutaneous at bedtime  magnesium oxide 400 milliGRAM(s) Oral two times a day with meals  methylPREDNISolone sodium succinate Injectable 60 milliGRAM(s) IV Push every 6 hours  mexiletine 200 milliGRAM(s) Oral every 8 hours  montelukast 10 milliGRAM(s) Oral at bedtime  polyethylene glycol 3350 17 Gram(s) Oral daily  sodium chloride 0.9%. 1000 milliLiter(s) (125 mL/Hr) IV Continuous <Continuous>    MEDICATIONS  (PRN):  acetaminophen     Tablet .. 650 milliGRAM(s) Oral every 6 hours PRN Temp greater or equal to 38C (100.4F), Mild Pain (1 - 3)  albuterol    90 MICROgram(s) HFA Inhaler 1 Puff(s) Inhalation four times a day PRN Shortness of Breath and/or Wheezing  albuterol    90 MICROgram(s) HFA Inhaler 2 Puff(s) Inhalation every 4 hours PRN Shortness of Breath  aluminum hydroxide/magnesium hydroxide/simethicone Suspension 30 milliLiter(s) Oral every 4 hours PRN Dyspepsia  benzonatate 100 milliGRAM(s) Oral every 8 hours PRN Cough  dextrose Oral Gel 15 Gram(s) Oral once PRN Blood Glucose LESS THAN 70 milliGRAM(s)/deciliter  guaifenesin/dextromethorphan Oral Liquid 10 milliLiter(s) Oral every 4 hours PRN Cough  melatonin 3 milliGRAM(s) Oral at bedtime PRN Insomnia  ondansetron Injectable 4 milliGRAM(s) IV Push every 8 hours PRN Nausea and/or Vomiting  traMADol 50 milliGRAM(s) Oral every 8 hours PRN Moderate Pain (4 - 6)

## 2023-05-07 NOTE — PROGRESS NOTE ADULT - SUBJECTIVE AND OBJECTIVE BOX
Date of service: 23 @ 09:30    Lying in bed in NAD  Has dry cough  Recent sputum culture reviewed  Fever is down    ROS: denies dizziness, no HA, no SOB, no abdominal pain, no diarrhea or constipation; no dysuria, no legs pain, no rashes    MEDICATIONS  (STANDING):  acetylcysteine 10%  Inhalation 4 milliLiter(s) Inhalation three times a day  albuterol/ipratropium for Nebulization 3 milliLiter(s) Nebulizer every 6 hours  albuterol/ipratropium for Nebulization 3 milliLiter(s) Nebulizer every 6 hours  apixaban 5 milliGRAM(s) Oral two times a day  buDESOnide    Inhalation Suspension 0.5 milliGRAM(s) Inhalation every 12 hours  dextrose 5%. 1000 milliLiter(s) (100 mL/Hr) IV Continuous <Continuous>  dextrose 5%. 1000 milliLiter(s) (50 mL/Hr) IV Continuous <Continuous>  dextrose 50% Injectable 25 Gram(s) IV Push once  dextrose 50% Injectable 12.5 Gram(s) IV Push once  dextrose 50% Injectable 25 Gram(s) IV Push once  fluconAZOLE   Tablet 100 milliGRAM(s) Oral daily  glucagon  Injectable 1 milliGRAM(s) IntraMuscular once  insulin glargine Injectable (LANTUS) 30 Unit(s) SubCutaneous at bedtime  insulin lispro (ADMELOG) corrective regimen sliding scale   SubCutaneous three times a day before meals  insulin lispro (ADMELOG) corrective regimen sliding scale   SubCutaneous at bedtime  magnesium oxide 400 milliGRAM(s) Oral two times a day with meals  methylPREDNISolone sodium succinate Injectable 60 milliGRAM(s) IV Push every 6 hours  mexiletine 200 milliGRAM(s) Oral every 8 hours  montelukast 10 milliGRAM(s) Oral at bedtime  polyethylene glycol 3350 17 Gram(s) Oral daily  sodium chloride 0.9%. 1000 milliLiter(s) (125 mL/Hr) IV Continuous <Continuous>    Vital Signs Last 24 Hrs  T(C): 36.3 (07 May 2023 08:06), Max: 37.2 (06 May 2023 15:29)  T(F): 97.3 (07 May 2023 08:06), Max: 99 (06 May 2023 15:29)  HR: 68 (07 May 2023 08:06) (68 - 82)  BP: 114/69 (07 May 2023 08:06) (114/69 - 146/78)  BP(mean): --  RR: 18 (07 May 2023 08:06) (18 - 18)  SpO2: 97% (07 May 2023 08:06) (95% - 97%)    Parameters below as of 07 May 2023 08:06  Patient On (Oxygen Delivery Method): room air     Physical exam:    Constitutional:  No acute distress  HEENT: NC/AT, EOMI, PERRLA, conjunctivae clear; ears and nose atraumatic  Neck: supple; thyroid not palpable  Back: no tenderness  Respiratory: respiratory effort normal; few crackles at bases  Cardiovascular: S1S2 regular, no murmurs  Abdomen: soft, not tender, not distended, positive BS; no liver or spleen organomegaly  Genitourinary: no suprapubic tenderness  Lymphatic: no LN palpable  Musculoskeletal: no muscle tenderness, no joint swelling or tenderness  Extremities: no pedal edema  Neurological/ Psychiatric: AxOx3, judgement and insight normal; moving all extremities  Skin: no rashes; no palpable lesions    Labs: reviewed                        11.8   8.07  )-----------( 181      ( 05 May 2023 06:21 )             42.0     05-    137  |  107  |  12  ----------------------------<  248<H>  4.9   |  23  |  0.56    Ca    8.7      05 May 2023 06:21    TPro  7.6  /  Alb  3.2<L>  /  TBili  0.3  /  DBili  x   /  AST  27  /  ALT  21  /  AlkPhos  104  05-04     LIVER FUNCTIONS - ( 04 May 2023 09:51 )  Alb: 3.2 g/dL / Pro: 7.6 gm/dL / ALK PHOS: 104 U/L / ALT: 21 U/L / AST: 27 U/L / GGT: x           Urinalysis Basic - ( 04 May 2023 13:31 )    Color: Yellow / Appearance: Clear / S.015 / pH: x  Gluc: x / Ketone: Small  / Bili: Negative / Urobili: Negative   Blood: x / Protein: Negative / Nitrite: Negative   Leuk Esterase: Negative / RBC: 6-10 /HPF / WBC 3-5 /HPF   Sq Epi: x / Non Sq Epi: x / Bacteria: Occasional    Parainfluenza 3 ( @ 09:51)  Detected    Culture - Sputum (collected 05 May 2023 21:53)  Source: .Sputum Sputum  Gram Stain (06 May 2023 07:44):    Few polymorphonuclear leukocytes per low power field    Few Squamous epithelial cells per low power field    Moderate Gram Positive Rods seen per oil power field  Preliminary Report (06 May 2023 21:29):    Normal Respiratory Jessica present    Culture - Urine (collected 04 May 2023 13:31)  Source: Clean Catch Clean Catch (Midstream)  Final Report (06 May 2023 10:42):    <10,000 CFU/mL Normal Urogenital Jessica    Culture - Blood (collected 04 May 2023 10:15)  Source: .Blood None  Preliminary Report (05 May 2023 16:01):    No growth to date.    Culture - Blood (collected 04 May 2023 09:51)  Source: .Blood Blood-Peripheral  Preliminary Report (05 May 2023 16:01):    No growth to date.    Radiology: all available radiological tests reviewed    < from: CT Chest No Cont (23 @ 13:36) >  Increased infectious or inflammatory bronchitis/bronchiolitis since 2023.  < end of copied text >      Advanced directives addressed: full resuscitation

## 2023-05-07 NOTE — PROGRESS NOTE ADULT - ASSESSMENT
PROBLEMS:    Acute respiratory failure with hypoxia  Severe asthma  Acute bronchiolitis with parainfluenza type 3   Bronchiectasis  Tracheobronchomalacia  Oral thrush    PLAN:    pulmonary unchanged-contine current rx  IV solumedrol to 60MG Q6H  Aerosols with Duoneb/Budesonide  Sputum C+S, fungus  Mucomyst 10% TID  Chest PT/Smart Vest  Singulair  OOB  ID FU  DVT PROPHYLASIX

## 2023-05-07 NOTE — PROGRESS NOTE ADULT - ASSESSMENT
72F w/PMH TIA, DVT, Colorectal CA s/p tx, Tracheobronchomalacia s/p Tracheobronchoplasty 2016 (no further evidence), adrenal insufficiency on steroids, COPD/Asthma, PAfib/AC, T2DM, recent admit 2 mos ago,  presents from NH for fever x2 days.  +worsening productive cough and sob.   In ED, MD d/w ID, rec to remove picc line (to be done by ED), given vanco iV, 1L ivf, duoneb, +febrile, CT chest: Increased infectious or inflammatory bronchitis/bronchiolitis since   4/5/2023.      #Sepsis as evidenced by hypotension, fever : No PNA  Parainfluenza Bonchiolitis  # Hx of Bronchiectasis  # Oral thrush  #Asthma exacerbation  - IVF  - ICU eval for hypotension appreciated; BP responded to iv fluids  - duonebs, alb inh prn  - pulm cs  - ID cs  - d/c meropenem/vanco  - antitussives  -monitor labs, vitals   solumedrol to 60 mg q 6 hrs  diflucan  smart vest for chest PTx  sputum gram stain +,but Cx grew normal val    #T2DM  - resume home dose insulin  - start ssi  - monitor FS  - check HA1c  changed diet to [facility] diet but refusing it and asking for regular diet.   Glucerna 1 can  tid    #Parox Afib, TIA  - c/w eliquis    # DVT PPX: on Eliquis      poc discussed with pt, team,  Dr. Krish MEHTA and advance care planning meeting done with the patient. She wishes to be fully resuscitated and mechanically ventilated if need arises. There are no limitations of any sort in her medical care and management. She is FULL CODE.

## 2023-05-07 NOTE — PROGRESS NOTE ADULT - SUBJECTIVE AND OBJECTIVE BOX
Subjective:    pat still congested & wheezy, sitting in bed. Getting out of the bed & using vest.    Home Medications:  acetylcysteine 10% inhalation solution: 2 milliliter(s) orally every 6 hours (04 May 2023 14:25)  apixaban 5 mg oral tablet: 1 tab(s) orally 2 times a day (04 May 2023 12:42)  Breo Ellipta 200 mcg-25 mcg/inh inhalation powder: 1 puff(s) inhaled once a day (04 May 2023 14:25)  budesonide 0.5 mg/2 mL inhalation suspension: 2 milliliter(s) inhaled 2 times a day (04 May 2023 12:42)  Crestor 5 mg oral tablet: 1 tab(s) orally once a day (at bedtime) (04 May 2023 12:42)  Diabetic Expectorant 100 mg/5 mL oral liquid: 10 milliliter(s) orally 4 times a day x 7 days (04 May 2023 14:25)  ferrous sulfate 325 mg (65 mg elemental iron) oral tablet: 1 tab(s) orally 2 times a day (04 May 2023 14:25)  HumaLOG KwikPen 100 units/mL injectable solution: Inject as per sliding scale: 100-150 = 5 units, 151-200 = 6 units, 201-250 = 7 units, 251-300 = 8 units, 301-350 = 9 units, 351-400 = 10 units, &gt; 400 = call md (04 May 2023 14:25)  Incruse Ellipta 62.5 mcg/inh inhalation powder: 1 puff(s) inhaled every 24 hours (04 May 2023 12:42)  insulin glargine 100 units/mL subcutaneous solution: 25 unit(s) subcutaneous once a day (at bedtime) (04 May 2023 12:42)  ipratropium-albuterol 0.5 mg-2.5 mg/3 mL inhalation solution: 3 milliliter(s) by nebulizer every 4 hours as needed for  shortness of breath and/or wheezing (04 May 2023 14:25)  ipratropium-albuterol 0.5 mg-2.5 mg/3 mL inhalation solution: 3 milliliter(s) inhaled every 6 hours (04 May 2023 12:42)  magnesium oxide 400 mg oral tablet: 1 tab(s) orally 2 times a day (04 May 2023 14:25)  Medrol 8 mg oral tablet: 1 tab(s) orally once a day (04 May 2023 12:42)  Merrem 500 mg intravenous injection: 500 milligram(s) intravenously every 12 hours x 5 days ***Course Not Complete*** (04 May 2023 14:25)  mexiletine 200 mg oral capsule: 1 cap(s) orally every 8 hours (04 May 2023 12:42)  MiraLax oral powder for reconstitution: 17 gram(s) orally once a day (04 May 2023 14:25)  omeprazole 20 mg oral delayed release tablet: 1 tab(s) orally once a day (04 May 2023 12:42)  ondansetron 4 mg oral tablet: 1 tab(s) orally every 6 hours, As Needed (04 May 2023 12:42)  saccharomyces boulardii lyo 250 mg oral capsule: 1 cap(s) orally 2 times a day (04 May 2023 12:42)  Senna Plus 50 mg-8.6 mg oral tablet: 2 tab(s) orally once a day (at bedtime) (04 May 2023 14:25)  sulfamethoxazole-trimethoprim 400 mg-80 mg oral tablet: 1 tab(s) orally once a day (04 May 2023 12:42)  traMADol 50 mg oral tablet: 1 tab(s) orally every 8 hours, As Needed (04 May 2023 12:42)    MEDICATIONS  (STANDING):  acetylcysteine 10%  Inhalation 4 milliLiter(s) Inhalation three times a day  albuterol/ipratropium for Nebulization 3 milliLiter(s) Nebulizer every 6 hours  albuterol/ipratropium for Nebulization 3 milliLiter(s) Nebulizer every 6 hours  apixaban 5 milliGRAM(s) Oral two times a day  buDESOnide    Inhalation Suspension 0.5 milliGRAM(s) Inhalation every 12 hours  dextrose 5%. 1000 milliLiter(s) (100 mL/Hr) IV Continuous <Continuous>  dextrose 5%. 1000 milliLiter(s) (50 mL/Hr) IV Continuous <Continuous>  dextrose 50% Injectable 25 Gram(s) IV Push once  dextrose 50% Injectable 12.5 Gram(s) IV Push once  dextrose 50% Injectable 25 Gram(s) IV Push once  fluconAZOLE   Tablet 100 milliGRAM(s) Oral daily  glucagon  Injectable 1 milliGRAM(s) IntraMuscular once  insulin glargine Injectable (LANTUS) 30 Unit(s) SubCutaneous at bedtime  insulin lispro (ADMELOG) corrective regimen sliding scale   SubCutaneous at bedtime  insulin lispro (ADMELOG) corrective regimen sliding scale   SubCutaneous three times a day before meals  magnesium oxide 400 milliGRAM(s) Oral two times a day with meals  methylPREDNISolone sodium succinate Injectable 60 milliGRAM(s) IV Push every 6 hours  mexiletine 200 milliGRAM(s) Oral every 8 hours  montelukast 10 milliGRAM(s) Oral at bedtime  polyethylene glycol 3350 17 Gram(s) Oral daily  sodium chloride 0.9%. 1000 milliLiter(s) (125 mL/Hr) IV Continuous <Continuous>    MEDICATIONS  (PRN):  acetaminophen     Tablet .. 650 milliGRAM(s) Oral every 6 hours PRN Temp greater or equal to 38C (100.4F), Mild Pain (1 - 3)  albuterol    90 MICROgram(s) HFA Inhaler 1 Puff(s) Inhalation four times a day PRN Shortness of Breath and/or Wheezing  albuterol    90 MICROgram(s) HFA Inhaler 2 Puff(s) Inhalation every 4 hours PRN Shortness of Breath  aluminum hydroxide/magnesium hydroxide/simethicone Suspension 30 milliLiter(s) Oral every 4 hours PRN Dyspepsia  benzonatate 100 milliGRAM(s) Oral every 8 hours PRN Cough  dextrose Oral Gel 15 Gram(s) Oral once PRN Blood Glucose LESS THAN 70 milliGRAM(s)/deciliter  guaifenesin/dextromethorphan Oral Liquid 10 milliLiter(s) Oral every 4 hours PRN Cough  melatonin 3 milliGRAM(s) Oral at bedtime PRN Insomnia  ondansetron Injectable 4 milliGRAM(s) IV Push every 8 hours PRN Nausea and/or Vomiting  traMADol 50 milliGRAM(s) Oral every 8 hours PRN Moderate Pain (4 - 6)      Allergies    iodine (Short breath; Swelling)  Avelox (Short breath; Pruritus)  shellfish (Anaphylaxis)  aspirin (Short breath)  Dilaudid (Short breath)  codeine (Short breath)  cefepime (Anaphylaxis)  penicillin (Anaphylaxis)  tetanus toxoid (Short breath)  Valium (Short breath)    Intolerances        Vital Signs Last 24 Hrs  T(C): 36.3 (07 May 2023 08:06), Max: 37.2 (06 May 2023 15:29)  T(F): 97.3 (07 May 2023 08:06), Max: 99 (06 May 2023 15:29)  HR: 68 (07 May 2023 08:06) (68 - 82)  BP: 114/69 (07 May 2023 08:06) (114/69 - 146/78)  BP(mean): --  RR: 18 (07 May 2023 08:06) (18 - 18)  SpO2: 97% (07 May 2023 08:06) (95% - 97%)    Parameters below as of 07 May 2023 08:06  Patient On (Oxygen Delivery Method): room air          PHYSICAL EXAMINATION:    NECK:  Supple. No lymphadenopathy. Jugular venous pressure not elevated. Carotids equal.   HEART:   The cardiac impulse has a normal quality. Reg., Nl S1 and S2.  There are no murmurs, rubs or gallops noted  CHEST:  Chest rhonchi to auscultation. Normal respiratory effort.  ABDOMEN:  Soft and nontender.   EXTREMITIES:  There is no edema.       LABS:

## 2023-05-07 NOTE — PROGRESS NOTE ADULT - ASSESSMENT
71 y/o Female with h/o TIA, DVT, Colorectal CA s/p tx, Tracheobronchomalacia s/p tracheobronchoplasty 2016, adrenal insufficiency on steroids, COPD/Asthma, Afib/AC, T2DM was admitted on 5/4 from NH for fever x 2 days. At the NH she had PICC line placed and started on meropenem empirically 2 days PTA, but continues to have fever and worsening productive cough and SOB. She endorses dry heaves and vomiting  last night, denies any diarrhea/abd pain. In ER, pt found to be hypotensive and received vancomycin IV.     1. Febrile syndrome improving. Acute bronchiolitis with parainfluenza type 3. Oral thrush. Allergy to PCN with anaphylaxis.   -BC x 2 noted  -CT chest noted - no infiltrates noted  -given meropenem for several days without improvement  -sputum c/s hsowes normal respiratory val  -on fluconazole 100 mg PO qd # 3  -tolerating abx well so far; no side effects noted  -continue antifungal coverage   -no need for abx therapy at this time  -respiratory care  -pulmonary evaluation appreciated  -monitor temps  -f/u CBC  -supportive care  2. Other issues:   -care per medicine

## 2023-05-08 LAB
GLUCOSE BLDC GLUCOMTR-MCNC: 201 MG/DL — HIGH (ref 70–99)
GLUCOSE BLDC GLUCOMTR-MCNC: 240 MG/DL — HIGH (ref 70–99)
GLUCOSE BLDC GLUCOMTR-MCNC: 302 MG/DL — HIGH (ref 70–99)
GLUCOSE BLDC GLUCOMTR-MCNC: 393 MG/DL — HIGH (ref 70–99)

## 2023-05-08 PROCEDURE — 99232 SBSQ HOSP IP/OBS MODERATE 35: CPT

## 2023-05-08 PROCEDURE — 99233 SBSQ HOSP IP/OBS HIGH 50: CPT

## 2023-05-08 RX ADMIN — Medication 0.5 MILLIGRAM(S): at 20:38

## 2023-05-08 RX ADMIN — Medication 60 MILLIGRAM(S): at 17:17

## 2023-05-08 RX ADMIN — Medication 60 MILLIGRAM(S): at 06:16

## 2023-05-08 RX ADMIN — POLYETHYLENE GLYCOL 3350 17 GRAM(S): 17 POWDER, FOR SOLUTION ORAL at 09:22

## 2023-05-08 RX ADMIN — Medication 4: at 09:14

## 2023-05-08 RX ADMIN — Medication 3 MILLILITER(S): at 09:53

## 2023-05-08 RX ADMIN — APIXABAN 5 MILLIGRAM(S): 2.5 TABLET, FILM COATED ORAL at 09:14

## 2023-05-08 RX ADMIN — Medication 4 MILLILITER(S): at 15:50

## 2023-05-08 RX ADMIN — MONTELUKAST 10 MILLIGRAM(S): 4 TABLET, CHEWABLE ORAL at 21:53

## 2023-05-08 RX ADMIN — MEXILETINE HYDROCHLORIDE 200 MILLIGRAM(S): 150 CAPSULE ORAL at 13:22

## 2023-05-08 RX ADMIN — Medication 3 MILLILITER(S): at 20:38

## 2023-05-08 RX ADMIN — MAGNESIUM OXIDE 400 MG ORAL TABLET 400 MILLIGRAM(S): 241.3 TABLET ORAL at 09:13

## 2023-05-08 RX ADMIN — FLUCONAZOLE 100 MILLIGRAM(S): 150 TABLET ORAL at 09:14

## 2023-05-08 RX ADMIN — Medication 500000 UNIT(S): at 17:17

## 2023-05-08 RX ADMIN — APIXABAN 5 MILLIGRAM(S): 2.5 TABLET, FILM COATED ORAL at 21:53

## 2023-05-08 RX ADMIN — PANTOPRAZOLE SODIUM 40 MILLIGRAM(S): 20 TABLET, DELAYED RELEASE ORAL at 06:15

## 2023-05-08 RX ADMIN — Medication 0.5 MILLIGRAM(S): at 09:53

## 2023-05-08 RX ADMIN — Medication 4: at 12:58

## 2023-05-08 RX ADMIN — Medication 4 MILLILITER(S): at 09:52

## 2023-05-08 RX ADMIN — INSULIN GLARGINE 30 UNIT(S): 100 INJECTION, SOLUTION SUBCUTANEOUS at 21:52

## 2023-05-08 RX ADMIN — Medication 4: at 21:53

## 2023-05-08 RX ADMIN — Medication 4 MILLILITER(S): at 20:38

## 2023-05-08 RX ADMIN — Medication 3 MILLILITER(S): at 14:45

## 2023-05-08 RX ADMIN — Medication 3 MILLILITER(S): at 01:28

## 2023-05-08 RX ADMIN — Medication 500000 UNIT(S): at 06:16

## 2023-05-08 RX ADMIN — MEXILETINE HYDROCHLORIDE 200 MILLIGRAM(S): 150 CAPSULE ORAL at 21:53

## 2023-05-08 RX ADMIN — Medication 500000 UNIT(S): at 12:16

## 2023-05-08 RX ADMIN — Medication 60 MILLIGRAM(S): at 12:16

## 2023-05-08 RX ADMIN — Medication 10: at 17:16

## 2023-05-08 RX ADMIN — MEXILETINE HYDROCHLORIDE 200 MILLIGRAM(S): 150 CAPSULE ORAL at 06:16

## 2023-05-08 RX ADMIN — MAGNESIUM OXIDE 400 MG ORAL TABLET 400 MILLIGRAM(S): 241.3 TABLET ORAL at 17:17

## 2023-05-08 RX ADMIN — Medication 30 MILLILITER(S): at 09:22

## 2023-05-08 NOTE — PROGRESS NOTE ADULT - SUBJECTIVE AND OBJECTIVE BOX
Subjective:    Awake, alert. Feels better today. +Sputum.    MEDICATIONS  (STANDING):  acetylcysteine 10%  Inhalation 4 milliLiter(s) Inhalation three times a day  albuterol/ipratropium for Nebulization 3 milliLiter(s) Nebulizer every 6 hours  albuterol/ipratropium for Nebulization 3 milliLiter(s) Nebulizer every 6 hours  apixaban 5 milliGRAM(s) Oral two times a day  buDESOnide    Inhalation Suspension 0.5 milliGRAM(s) Inhalation every 12 hours  dextrose 5%. 1000 milliLiter(s) (100 mL/Hr) IV Continuous <Continuous>  dextrose 5%. 1000 milliLiter(s) (50 mL/Hr) IV Continuous <Continuous>  dextrose 50% Injectable 25 Gram(s) IV Push once  dextrose 50% Injectable 12.5 Gram(s) IV Push once  dextrose 50% Injectable 25 Gram(s) IV Push once  fluconAZOLE   Tablet 100 milliGRAM(s) Oral daily  glucagon  Injectable 1 milliGRAM(s) IntraMuscular once  insulin glargine Injectable (LANTUS) 30 Unit(s) SubCutaneous at bedtime  insulin lispro (ADMELOG) corrective regimen sliding scale   SubCutaneous three times a day before meals  insulin lispro (ADMELOG) corrective regimen sliding scale   SubCutaneous at bedtime  magnesium oxide 400 milliGRAM(s) Oral two times a day with meals  methylPREDNISolone sodium succinate Injectable 60 milliGRAM(s) IV Push every 6 hours  mexiletine 200 milliGRAM(s) Oral every 8 hours  montelukast 10 milliGRAM(s) Oral at bedtime  nystatin    Suspension 629282 Unit(s) Oral every 6 hours  pantoprazole    Tablet 40 milliGRAM(s) Oral before breakfast  polyethylene glycol 3350 17 Gram(s) Oral daily    MEDICATIONS  (PRN):  acetaminophen     Tablet .. 650 milliGRAM(s) Oral every 6 hours PRN Temp greater or equal to 38C (100.4F), Mild Pain (1 - 3)  albuterol    90 MICROgram(s) HFA Inhaler 1 Puff(s) Inhalation four times a day PRN Shortness of Breath and/or Wheezing  albuterol    90 MICROgram(s) HFA Inhaler 2 Puff(s) Inhalation every 4 hours PRN Shortness of Breath  aluminum hydroxide/magnesium hydroxide/simethicone Suspension 30 milliLiter(s) Oral every 4 hours PRN Dyspepsia  benzonatate 100 milliGRAM(s) Oral every 8 hours PRN Cough  dextrose Oral Gel 15 Gram(s) Oral once PRN Blood Glucose LESS THAN 70 milliGRAM(s)/deciliter  guaifenesin/dextromethorphan Oral Liquid 10 milliLiter(s) Oral every 4 hours PRN Cough  melatonin 3 milliGRAM(s) Oral at bedtime PRN Insomnia  ondansetron Injectable 4 milliGRAM(s) IV Push every 8 hours PRN Nausea and/or Vomiting  traMADol 50 milliGRAM(s) Oral every 8 hours PRN Moderate Pain (4 - 6)      Allergies    iodine (Short breath; Swelling)  Avelox (Short breath; Pruritus)  shellfish (Anaphylaxis)  aspirin (Short breath)  Dilaudid (Short breath)  codeine (Short breath)  cefepime (Anaphylaxis)  penicillin (Anaphylaxis)  tetanus toxoid (Short breath)  Valium (Short breath)    Intolerances        Vital Signs Last 24 Hrs  T(C): 36.6 (08 May 2023 08:00), Max: 36.9 (07 May 2023 23:21)  T(F): 97.8 (08 May 2023 08:00), Max: 98.4 (07 May 2023 23:21)  HR: 65 (08 May 2023 08:00) (65 - 93)  BP: 142/71 (08 May 2023 08:00) (142/71 - 152/85)  BP(mean): --  RR: 18 (08 May 2023 08:00) (18 - 18)  SpO2: 100% (08 May 2023 08:00) (93% - 100%)    Parameters below as of 08 May 2023 08:00  Patient On (Oxygen Delivery Method): room air        PHYSICAL EXAMINATION:    NECK:  Supple. No lymphadenopathy. Jugular venous pressure not elevated.   HEART:   The cardiac impulse has a normal quality. Reg., Nl S1 and S2.    CHEST:  Chest with scattered insp./exp wheezes with sl. improved air entry. Normal respiratory effort.  ABDOMEN:  Soft and nontender.   EXTREMITIES:  There is tr edema.       LABS:                RADIOLOGY & ADDITIONAL TESTS:    Assessment and Recommendation:   · Assessment	  Assessment/Plan    - Maintain solumedrol at 60MG Q6H for another 24 hrs  - Aerosols with Duoneb/Budesonide  - Sputum C+S, fungus  - Mucomyst 10% TID  - Chest PT/Smart Vest  - Singulair  - Monitor O2 Sats  - OOB to chair    Problem/Recommendation - 1:  ·  Acute respiratory failure with hypoxia.   Problem/Recommendation - 2:  ·  Severe asthma.   Problem/Recommendation - 3:  ·  Dyspnea.   Problem/Recommendation - 4:  ·  Bronchiectasis.   Problem/Recommendation - 5:  ·  Parainfluenza infection.   Problem/Recommendation - 6:  ·  Tracheobronchomalacia.

## 2023-05-08 NOTE — PROGRESS NOTE ADULT - ASSESSMENT
72F w/PMH TIA, DVT, Colorectal CA s/p tx, Tracheobronchomalacia s/p Tracheobronchoplasty 2016 (no further evidence), adrenal insufficiency on steroids, COPD/Asthma, PAfib/AC, T2DM, recent admit 2 mos ago,  presents from NH for fever x2 days.  +worsening productive cough and sob.   In ED, MD d/w ID, rec to remove picc line (to be done by ED), given vanco iV, 1L ivf, duoneb, +febrile, CT chest: Increased infectious or inflammatory bronchitis/bronchiolitis since   4/5/2023.      #Sepsis as evidenced by hypotension, fever : No PNA  Parainfluenza Bonchiolitis  # Hx of Bronchiectasis  # Oral thrush  #Asthma exacerbation  - ICU eval for hypotension appreciated; BP responded to iv fluids  - duonebs, alb inh prn  - pulm cs  - ID cs  - d/c meropenem/vanco  - antitussives  -monitor labs, vitals   solumedrol 60 mg q 6 hrs  diflucan  smart vest for chest PTx  sputum gram stain +, but Cx grew normal val  monitor clinically     #T2DM  - resume home dose insulin  - start ssi  - monitor FS  - check HA1c  changed diet to [facility] diet but refusing it and asking for regular diet.   Glucerna 1 can  tid    #Parox Afib, TIA  - c/w eliquis    # DVT PPX: on Eliquis      poc discussed with pt, team,     GOC and advance care planning meeting done with the patient. She wishes to be fully resuscitated and mechanically ventilated if need arises. There are no limitations of any sort in her medical care and management. She is FULL CODE.

## 2023-05-08 NOTE — PROGRESS NOTE ADULT - SUBJECTIVE AND OBJECTIVE BOX
: sob better  no cp  on O2 2 L nc    : says that she is feeling less terrible today; sob better  asking for glucerna 3 times a day, added  refused to take diabetic diet, wants regular diet    : feeling better  Off O2; 97% room air    : c/o feet and ankle edema but refusing any diuretics for it    PHYSICAL EXAM:    Vital Signs Last 24 Hrs  T(C): 36.8 (08 May 2023 16:07), Max: 36.9 (07 May 2023 23:21)  T(F): 98.2 (08 May 2023 16:07), Max: 98.4 (07 May 2023 23:21)  HR: 80 (08 May 2023 16:07) (65 - 93)  BP: 132/70 (08 May 2023 16:07) (132/70 - 150/64)  BP(mean): --  RR: 18 (08 May 2023 16:07) (18 - 18)  SpO2: 96% (08 May 2023 16:07) (93% - 100%)    Parameters below as of 08 May 2023 16:07  Patient On (Oxygen Delivery Method): room air        Constitutional: Weak appearing  HEENT: Atraumatic, EBER,   Respiratory: Breath Sounds normal, no rhonchi/wheeze  Cardiovascular: N S1S2;   Gastrointestinal: Abdomen soft, non tender, Bowel Sounds present  Extremities: 1+ lower leg/feet edema, peripheral pulses present  Neurological: AAO x 3, no gross focal motor deficits  Skin: Non cellulitic, no rash, ulcers  Lymph Nodes: No lymphadenopathy noted  Back: No CVA tenderness   Musculoskeletal: non tender  Breasts: Deferred  Genitourinary: deferred  Rectal: Deferred    All Labs/EKG/Radiology/Meds reviewed by me                          11.8   8.07  )-----------( 181      ( 05 May 2023 06:21 )             42.0       CBC Full  -  ( 05 May 2023 06:21 )  WBC Count : 8.07 K/uL  RBC Count : 5.51 M/uL  Hemoglobin : 11.8 g/dL  Hematocrit : 42.0 %  Platelet Count - Automated : 181 K/uL  Mean Cell Volume : 76.2 fl  Mean Cell Hemoglobin : 21.4 pg  Mean Cell Hemoglobin Concentration : 28.1 gm/dL  Auto Neutrophil # : x  Auto Lymphocyte # : x  Auto Monocyte # : x  Auto Eosinophil # : x  Auto Basophil # : x  Auto Neutrophil % : x  Auto Lymphocyte % : x  Auto Monocyte % : x    Culture - Sputum (collected 05 May 2023 21:53)  Source: .Sputum Sputum  Gram Stain (06 May 2023 07:44):    Few polymorphonuclear leukocytes per low power field    Few Squamous epithelial cells per low power field    Moderate Gram Positive Rods seen per oil power field    Culture - Urine (collected 04 May 2023 13:31)  Source: Clean Catch Clean Catch (Midstream)  Final Report (06 May 2023 10:42):    <10,000 CFU/mL Normal Urogenital Jessica    Culture - Blood (collected 04 May 2023 10:15)  Source: .Blood None  Preliminary Report (05 May 2023 16:01):    No growth to date.    Culture - Blood (collected 04 May 2023 09:51)  Source: .Blood Blood-Peripheral  Preliminary Report (05 May 2023 16:01):    No growth to date.    Auto Eosinophil % : x  Auto Basophil % : x          137  |  107  |  12  ----------------------------<  248<H>  4.9   |  23  |  0.56    Ca    8.7      05 May 2023 06:21    TPro  7.6  /  Alb  3.2<L>  /  TBili  0.3  /  DBili  x   /  AST  27  /  ALT  21  /  AlkPhos  104  05-04      LIVER FUNCTIONS - ( 04 May 2023 09:51 )  Alb: 3.2 g/dL / Pro: 7.6 gm/dL / ALK PHOS: 104 U/L / ALT: 21 U/L / AST: 27 U/L / GGT: x                       Urinalysis Basic - ( 04 May 2023 13:31 )    Color: Yellow / Appearance: Clear / S.015 / pH: x  Gluc: x / Ketone: Small  / Bili: Negative / Urobili: Negative   Blood: x / Protein: Negative / Nitrite: Negative   Leuk Esterase: Negative / RBC: 6-10 /HPF / WBC 3-5 /HPF   Sq Epi: x / Non Sq Epi: x / Bacteria: Occasional        < from: CT Chest No Cont (23 @ 13:36) >  Increased infectious or inflammatory bronchitis/bronchiolitis since   2023.    < end of copied text >      MEDICATIONS  (STANDING):  acetylcysteine 10%  Inhalation 4 milliLiter(s) Inhalation three times a day  albuterol/ipratropium for Nebulization 3 milliLiter(s) Nebulizer every 6 hours  albuterol/ipratropium for Nebulization 3 milliLiter(s) Nebulizer every 6 hours  apixaban 5 milliGRAM(s) Oral two times a day  buDESOnide    Inhalation Suspension 0.5 milliGRAM(s) Inhalation every 12 hours  dextrose 5%. 1000 milliLiter(s) (100 mL/Hr) IV Continuous <Continuous>  dextrose 5%. 1000 milliLiter(s) (50 mL/Hr) IV Continuous <Continuous>  dextrose 50% Injectable 25 Gram(s) IV Push once  dextrose 50% Injectable 12.5 Gram(s) IV Push once  dextrose 50% Injectable 25 Gram(s) IV Push once  fluconAZOLE   Tablet 100 milliGRAM(s) Oral daily  glucagon  Injectable 1 milliGRAM(s) IntraMuscular once  insulin glargine Injectable (LANTUS) 25 Unit(s) SubCutaneous at bedtime  insulin lispro (ADMELOG) corrective regimen sliding scale   SubCutaneous three times a day before meals  insulin lispro (ADMELOG) corrective regimen sliding scale   SubCutaneous at bedtime  magnesium oxide 400 milliGRAM(s) Oral two times a day with meals  methylPREDNISolone sodium succinate Injectable 60 milliGRAM(s) IV Push every 6 hours  mexiletine 200 milliGRAM(s) Oral every 8 hours  montelukast 10 milliGRAM(s) Oral at bedtime  polyethylene glycol 3350 17 Gram(s) Oral daily      MEDICATIONS  (PRN):  acetaminophen     Tablet .. 650 milliGRAM(s) Oral every 6 hours PRN Temp greater or equal to 38C (100.4F), Mild Pain (1 - 3)  albuterol    90 MICROgram(s) HFA Inhaler 1 Puff(s) Inhalation four times a day PRN Shortness of Breath and/or Wheezing  albuterol    90 MICROgram(s) HFA Inhaler 2 Puff(s) Inhalation every 4 hours PRN Shortness of Breath  aluminum hydroxide/magnesium hydroxide/simethicone Suspension 30 milliLiter(s) Oral every 4 hours PRN Dyspepsia  benzonatate 100 milliGRAM(s) Oral every 8 hours PRN Cough  dextrose Oral Gel 15 Gram(s) Oral once PRN Blood Glucose LESS THAN 70 milliGRAM(s)/deciliter  guaifenesin/dextromethorphan Oral Liquid 10 milliLiter(s) Oral every 4 hours PRN Cough  melatonin 3 milliGRAM(s) Oral at bedtime PRN Insomnia  ondansetron Injectable 4 milliGRAM(s) IV Push every 8 hours PRN Nausea and/or Vomiting  traMADol 50 milliGRAM(s) Oral every 8 hours PRN Moderate Pain (4 - 6)

## 2023-05-09 LAB
ALBUMIN SERPL ELPH-MCNC: 2.7 G/DL — LOW (ref 3.3–5)
ALP SERPL-CCNC: 77 U/L — SIGNIFICANT CHANGE UP (ref 40–120)
ALT FLD-CCNC: 25 U/L — SIGNIFICANT CHANGE UP (ref 12–78)
ANION GAP SERPL CALC-SCNC: 5 MMOL/L — SIGNIFICANT CHANGE UP (ref 5–17)
AST SERPL-CCNC: 20 U/L — SIGNIFICANT CHANGE UP (ref 15–37)
BILIRUB SERPL-MCNC: 0.3 MG/DL — SIGNIFICANT CHANGE UP (ref 0.2–1.2)
BUN SERPL-MCNC: 18 MG/DL — SIGNIFICANT CHANGE UP (ref 7–23)
CALCIUM SERPL-MCNC: 8.5 MG/DL — SIGNIFICANT CHANGE UP (ref 8.5–10.1)
CHLORIDE SERPL-SCNC: 106 MMOL/L — SIGNIFICANT CHANGE UP (ref 96–108)
CO2 SERPL-SCNC: 29 MMOL/L — SIGNIFICANT CHANGE UP (ref 22–31)
CREAT SERPL-MCNC: 0.68 MG/DL — SIGNIFICANT CHANGE UP (ref 0.5–1.3)
CULTURE RESULTS: SIGNIFICANT CHANGE UP
CULTURE RESULTS: SIGNIFICANT CHANGE UP
EGFR: 91 ML/MIN/1.73M2 — SIGNIFICANT CHANGE UP
GLUCOSE BLDC GLUCOMTR-MCNC: 181 MG/DL — HIGH (ref 70–99)
GLUCOSE BLDC GLUCOMTR-MCNC: 325 MG/DL — HIGH (ref 70–99)
GLUCOSE BLDC GLUCOMTR-MCNC: 340 MG/DL — HIGH (ref 70–99)
GLUCOSE BLDC GLUCOMTR-MCNC: 359 MG/DL — HIGH (ref 70–99)
GLUCOSE SERPL-MCNC: 179 MG/DL — HIGH (ref 70–99)
HCT VFR BLD CALC: 37 % — SIGNIFICANT CHANGE UP (ref 34.5–45)
HGB BLD-MCNC: 10.9 G/DL — LOW (ref 11.5–15.5)
MCHC RBC-ENTMCNC: 21.6 PG — LOW (ref 27–34)
MCHC RBC-ENTMCNC: 29.5 GM/DL — LOW (ref 32–36)
MCV RBC AUTO: 73.4 FL — LOW (ref 80–100)
NRBC # BLD: 3 /100 WBCS — HIGH (ref 0–0)
PLATELET # BLD AUTO: 241 K/UL — SIGNIFICANT CHANGE UP (ref 150–400)
POTASSIUM SERPL-MCNC: 4.1 MMOL/L — SIGNIFICANT CHANGE UP (ref 3.5–5.3)
POTASSIUM SERPL-SCNC: 4.1 MMOL/L — SIGNIFICANT CHANGE UP (ref 3.5–5.3)
PROT SERPL-MCNC: 5.9 GM/DL — LOW (ref 6–8.3)
RBC # BLD: 5.04 M/UL — SIGNIFICANT CHANGE UP (ref 3.8–5.2)
RBC # FLD: 23.4 % — HIGH (ref 10.3–14.5)
SODIUM SERPL-SCNC: 140 MMOL/L — SIGNIFICANT CHANGE UP (ref 135–145)
SPECIMEN SOURCE: SIGNIFICANT CHANGE UP
SPECIMEN SOURCE: SIGNIFICANT CHANGE UP
WBC # BLD: 12.91 K/UL — HIGH (ref 3.8–10.5)
WBC # FLD AUTO: 12.91 K/UL — HIGH (ref 3.8–10.5)

## 2023-05-09 PROCEDURE — 99233 SBSQ HOSP IP/OBS HIGH 50: CPT

## 2023-05-09 PROCEDURE — 99232 SBSQ HOSP IP/OBS MODERATE 35: CPT

## 2023-05-09 RX ADMIN — Medication 3 MILLILITER(S): at 01:49

## 2023-05-09 RX ADMIN — Medication 60 MILLIGRAM(S): at 06:44

## 2023-05-09 RX ADMIN — APIXABAN 5 MILLIGRAM(S): 2.5 TABLET, FILM COATED ORAL at 22:38

## 2023-05-09 RX ADMIN — Medication 0.5 MILLIGRAM(S): at 21:52

## 2023-05-09 RX ADMIN — FLUCONAZOLE 100 MILLIGRAM(S): 150 TABLET ORAL at 09:06

## 2023-05-09 RX ADMIN — MONTELUKAST 10 MILLIGRAM(S): 4 TABLET, CHEWABLE ORAL at 22:39

## 2023-05-09 RX ADMIN — Medication 4: at 22:39

## 2023-05-09 RX ADMIN — Medication 500000 UNIT(S): at 23:15

## 2023-05-09 RX ADMIN — Medication 40 MILLIGRAM(S): at 17:18

## 2023-05-09 RX ADMIN — MEXILETINE HYDROCHLORIDE 200 MILLIGRAM(S): 150 CAPSULE ORAL at 22:38

## 2023-05-09 RX ADMIN — PANTOPRAZOLE SODIUM 40 MILLIGRAM(S): 20 TABLET, DELAYED RELEASE ORAL at 09:06

## 2023-05-09 RX ADMIN — MAGNESIUM OXIDE 400 MG ORAL TABLET 400 MILLIGRAM(S): 241.3 TABLET ORAL at 17:19

## 2023-05-09 RX ADMIN — Medication 4 MILLILITER(S): at 10:03

## 2023-05-09 RX ADMIN — Medication 500000 UNIT(S): at 17:18

## 2023-05-09 RX ADMIN — Medication 500000 UNIT(S): at 01:23

## 2023-05-09 RX ADMIN — Medication 3 MILLILITER(S): at 21:52

## 2023-05-09 RX ADMIN — MEXILETINE HYDROCHLORIDE 200 MILLIGRAM(S): 150 CAPSULE ORAL at 06:45

## 2023-05-09 RX ADMIN — APIXABAN 5 MILLIGRAM(S): 2.5 TABLET, FILM COATED ORAL at 09:06

## 2023-05-09 RX ADMIN — Medication 10: at 17:19

## 2023-05-09 RX ADMIN — Medication 0.5 MILLIGRAM(S): at 10:03

## 2023-05-09 RX ADMIN — MAGNESIUM OXIDE 400 MG ORAL TABLET 400 MILLIGRAM(S): 241.3 TABLET ORAL at 09:07

## 2023-05-09 RX ADMIN — Medication 40 MILLIGRAM(S): at 11:34

## 2023-05-09 RX ADMIN — Medication 4 MILLILITER(S): at 14:01

## 2023-05-09 RX ADMIN — Medication 3 MILLILITER(S): at 10:05

## 2023-05-09 RX ADMIN — Medication 8: at 12:02

## 2023-05-09 RX ADMIN — Medication 60 MILLIGRAM(S): at 01:22

## 2023-05-09 RX ADMIN — INSULIN GLARGINE 30 UNIT(S): 100 INJECTION, SOLUTION SUBCUTANEOUS at 22:39

## 2023-05-09 RX ADMIN — Medication 3 MILLILITER(S): at 13:59

## 2023-05-09 RX ADMIN — MEXILETINE HYDROCHLORIDE 200 MILLIGRAM(S): 150 CAPSULE ORAL at 13:32

## 2023-05-09 RX ADMIN — Medication 500000 UNIT(S): at 06:44

## 2023-05-09 RX ADMIN — Medication 2: at 09:06

## 2023-05-09 RX ADMIN — Medication 40 MILLIGRAM(S): at 23:15

## 2023-05-09 NOTE — DIETITIAN INITIAL EVALUATION ADULT - ORAL NUTRITION SUPPLEMENTS
Jose Massey Jr. is an 51 year old male here for his annual physical and follow-up of hypertension for which he was begun on lisinopril in 6/2018 .  He denies any headaches or change in vision, any chest pain, chest discomfort unusual shortness of breath, PND or orthopnea.  No neurologic symptoms He has seasonal allergies and takes over-the-counter medications.  Previously is allergic to fruits and couldn't eat pears, peaches, apples and plums without his mouth swelling up.  Symptoms  improved and now he can eat apples and pears in limited amounts without reaction. He also has a history of gout but no recent attacks.  No history of kidney stones or tophi.    Past Medical History:   Diagnosis Date   • Essential (primary) hypertension    • Gout    • History of food allergy     Pears, peaches, apples and plums, leading to mild swelling but now can eat apples and pears.   • Hypercholesterolemia    • Plantar fasciitis, bilateral    • Psoriasis      Past Surgical History:   Procedure Laterality Date   • Colonoscopy diagnostic  08/01/2019    Affi, occult blood positive, benign mucosal polyp, 10 years   • Lasik       ALLERGIES:  No Known Allergies  Current Outpatient Medications   Medication Sig Dispense Refill   • lisinopril (ZESTRIL) 10 MG tablet TAKE 1 TABLET DAILY 90 tablet 0     No current facility-administered medications for this visit.     Social History     Socioeconomic History   • Marital status: Single     Spouse name: Not on file   • Number of children: 0   • Years of education: Not on file   • Highest education level: Not on file   Occupational History   • Occupation:      Employer: [employer]   Tobacco Use   • Smoking status: Never Smoker   • Smokeless tobacco: Never Used   Substance and Sexual Activity   • Alcohol use: Yes     Alcohol/week: 0.0 - 2.0 standard drinks   • Drug use: Never   • Sexual activity: Not on file   Other Topics Concern   • Not on file   Social  History Narrative   • Not on file     Social Determinants of Health     Financial Resource Strain:    • Social Determinants: Financial Resource Strain:    Food Insecurity:    • Social Determinants: Food Insecurity:    Transportation Needs:    • Lack of Transportation (Medical):    • Lack of Transportation (Non-Medical):    Physical Activity:    • Days of Exercise per Week:    • Minutes of Exercise per Session:    Stress:    • Social Determinants: Stress:    Social Connections:    • Social Determinants: Social Connections:    Intimate Partner Violence:    • Social Determinants: Intimate Partner Violence Past Fear:    • Social Determinants: Intimate Partner Violence Current Fear:      Family Status   Relation Name Status   • Fa  Alive        MI age 51, had CABG, has AAA   • Mo  Alive        breast cancer, remote hx MI     ROS  GENERAL: Weight down 7 lb in thse last year Appetite and level of energy are stable. Sleeps okay.  HEENT: Eyes: No loss of vision, blurred vision, double vision. ENT: No  headaches or change in hearing. No vertigo or tinnitus. No nasal problems,   No oral problems, hoarseness, sore throat or difficulty swallowing.   CARDIOVASCULAR: No chest pain, chest discomfort or palpitations.  PULMONARY: No cough, shortness of breath, PND or orthopnea.  GASTROINTESTINAL: No nausea, vomiting,  abdominal pain, change  in bowel habits. No gross GI bleeding.   GENITOURINARY: No frequency, dysuria, hematuria, or decreased  urinary stream. No incontinence.  MUSCULOSKELETAL: No joint swelling, pain, muscle aches or weakness.  NEUROLOGIC: No lightheadedness, focal weakness, numbness or tingling. No  problems with balance.  SKIN: No itching, rash or jaundice.    Physical Exam  GENERAL: White male in no apparent distress, alert and oriented x3.  Visit Vitals  /80 (BP Location: LUE - Left upper extremity, Patient Position: Sitting, Cuff Size: Regular)   Pulse (!) 54   Ht 5' 9\" (1.753 m)   Wt 95.1 kg   SpO2 98%    BMI 30.96 kg/m²     HEENT: Eyes: Pupils are 3 mm, equal, round, reactive directly,   consensually and near. Full conjugate extraocular movements bilateral.   Fundi benign. Auditory canals clear. Tympanic membranes normal. Nasal mucosa, oral soft tissues and throat are within normal   limits. Adequate dental repair.   NECK: No cervical or supraclavicular lymphadenopathy. No thyromegaly,   thyroid mass, or tenderness. Trachea midline and mobile.   CARDIOVASCULAR: Regular rate and rhythm, normal S1 and S2 without murmur or gallop. The PMI is nondisplaced. Carotids are 2+ without bruit.   LUNGS: Clear to auscultation and percussion. Breathing is nonlabored.   ABDOMEN: Nontender, nondistended without hepatosplenomegaly or mass,  bowel sounds normal.  EXTREMITIES: Without cyanosis, clubbing or edema. Peripheral pulses intact. Bunion deformity of feet left worse than right. Flat feet  SKIN:No rash or jaundice.    Assessment:  Hypertension, adequate control.  History of gout  Seasonal allergies  History of food allergies, improved  GERD  History of hyperplastic colon polyp 08/02/2019, follow-up 10 years    Plan:  Same medications,  check fasting blood sugar to screen for diabetes, lipid panel to screen for ischemic heart disease We discussed prostate cancer screening and after hearing potential risks and benefits of screening, he declines prostate cancer screening with PSA. Follow-up otherwise in a year.    Jens Metzger MD  5/13/2021   Add Abhijeet ARGUELLO (provides 220kcal, 10g protein)

## 2023-05-09 NOTE — DIETITIAN INITIAL EVALUATION ADULT - OTHER INFO
72F w/PMH TIA, DVT, Colorectal CA s/p tx, Tracheobronchomalacia s/p Tracheobronchoplasty 2016 (no further evidence), adrenal insufficiency on steroids, COPD/Asthma, Afib/AC, T2DM, recent admit 2 mos ago.  She had PICC line placed and started on meropenem empirically 2 days ago, but continues to have fever and worsening productive cough and sob. Gives own hx, she endorses dry heaves and vomiting last night.  Admit for sepsis as evidenced by hypotension, fever in setting of pneumonia    Known to nutr services and dx'd w/ mal on multiple admissions; still meets criteria. W/ T2DM - hgb A1C of 10.3% on 3/5/23 elevated & POCTs elevated x 24 hrs(302, 393, 240). RD observed small break fast tray and consumed ~100% (muffin, glucerna, coffee). Reports UBW of 150# since 2018 (x 5 years); bed scale wt of 147# taken by RD on 5/9/23 (+1 r/l leg edema); could be masking losses. Wt hx as per EMR: 140# (taken by RD on 3/6/23), however ? accuracy of wt as per EMR, 134# on 1/6/23; 122# on 12/15/22; 137# on 11/26/22; 152# on 11/14/22 (possible fluid retention); 154# on 10/17/22; 144# on 9/24/22; 148# on 9/7/22 (possible fluid retention); wt fluctuates 2/2 fluid retention; no pertinent wt changes at this time. NFPE reveals moderate-severe muscle/ fat wasting - pt appears thin, frail, and nalini. Pt refusing CHO consistent diet; c/w regular diet and is requesting glucerna TID. Add Glucerna TID (provides 220kcal, 10g protein) and alternate flavors - pt is receptive. Suggest SLP consult 2/2 on soft and bite sized diet previously. RD provided written and verbal nutrition education on over all healthy eating, target BGL and CHO counting - pt is receptive. See below for other recommendations.

## 2023-05-09 NOTE — PROGRESS NOTE ADULT - ASSESSMENT
72F w/PMH TIA, DVT, Colorectal CA s/p tx, Tracheobronchomalacia s/p Tracheobronchoplasty 2016 (no further evidence), adrenal insufficiency on steroids, COPD/Asthma, PAfib/AC, T2DM, recent admit 2 mos ago,  presents from NH for fever x2 days.  +worsening productive cough and sob.   In ED, MD d/w ID, rec to remove picc line (to be done by ED), given vanco iV, 1L ivf, duoneb, +febrile, CT chest: Increased infectious or inflammatory bronchitis/bronchiolitis since   4/5/2023.      #Sepsis as evidenced by hypotension, fever : No PNA  Parainfluenza Bonchiolitis  # Hx of Bronchiectasis  # Oral thrush  #Asthma exacerbation  - ICU eval for hypotension appreciated; BP responded to iv fluids  - duonebs, alb inh prn  - pulm cs  - ID cs  - d/c meropenem/vanco  - antitussives  -monitor labs, vitals   solumedrol 40 mg q 6 hrs  diflucan  smart vest for chest PTx  sputum gram stain +, but Cx grew normal val  monitor clinically     #T2DM  - resume home dose insulin  - start ssi  - monitor FS  - check HA1c  changed diet to [facility] diet but refusing it and asking for regular diet.   Glucerna 1 can  tid    #Parox Afib, TIA  - c/w eliquis    # DVT PPX: on Eliquis      poc discussed with pt, team,     GOC and advance care planning meeting done with the patient. She wishes to be fully resuscitated and mechanically ventilated if need arises. There are no limitations of any sort in her medical care and management. She is FULL CODE.

## 2023-05-09 NOTE — PROGRESS NOTE ADULT - SUBJECTIVE AND OBJECTIVE BOX
Subjective:    Awake, alert. Feels better today. Decreased cough and sputum.    MEDICATIONS  (STANDING):  acetylcysteine 10%  Inhalation 4 milliLiter(s) Inhalation three times a day  albuterol/ipratropium for Nebulization 3 milliLiter(s) Nebulizer every 6 hours  albuterol/ipratropium for Nebulization 3 milliLiter(s) Nebulizer every 6 hours  apixaban 5 milliGRAM(s) Oral two times a day  buDESOnide    Inhalation Suspension 0.5 milliGRAM(s) Inhalation every 12 hours  dextrose 5%. 1000 milliLiter(s) (100 mL/Hr) IV Continuous <Continuous>  dextrose 5%. 1000 milliLiter(s) (50 mL/Hr) IV Continuous <Continuous>  dextrose 50% Injectable 25 Gram(s) IV Push once  dextrose 50% Injectable 12.5 Gram(s) IV Push once  dextrose 50% Injectable 25 Gram(s) IV Push once  fluconAZOLE   Tablet 100 milliGRAM(s) Oral daily  glucagon  Injectable 1 milliGRAM(s) IntraMuscular once  insulin glargine Injectable (LANTUS) 30 Unit(s) SubCutaneous at bedtime  insulin lispro (ADMELOG) corrective regimen sliding scale   SubCutaneous three times a day before meals  insulin lispro (ADMELOG) corrective regimen sliding scale   SubCutaneous at bedtime  magnesium oxide 400 milliGRAM(s) Oral two times a day with meals  methylPREDNISolone sodium succinate Injectable 40 milliGRAM(s) IV Push every 6 hours  mexiletine 200 milliGRAM(s) Oral every 8 hours  montelukast 10 milliGRAM(s) Oral at bedtime  nystatin    Suspension 043965 Unit(s) Oral every 6 hours  pantoprazole    Tablet 40 milliGRAM(s) Oral before breakfast  polyethylene glycol 3350 17 Gram(s) Oral daily    MEDICATIONS  (PRN):  acetaminophen     Tablet .. 650 milliGRAM(s) Oral every 6 hours PRN Temp greater or equal to 38C (100.4F), Mild Pain (1 - 3)  albuterol    90 MICROgram(s) HFA Inhaler 1 Puff(s) Inhalation four times a day PRN Shortness of Breath and/or Wheezing  albuterol    90 MICROgram(s) HFA Inhaler 2 Puff(s) Inhalation every 4 hours PRN Shortness of Breath  aluminum hydroxide/magnesium hydroxide/simethicone Suspension 30 milliLiter(s) Oral every 4 hours PRN Dyspepsia  dextrose Oral Gel 15 Gram(s) Oral once PRN Blood Glucose LESS THAN 70 milliGRAM(s)/deciliter  guaifenesin/dextromethorphan Oral Liquid 10 milliLiter(s) Oral every 4 hours PRN Cough  melatonin 3 milliGRAM(s) Oral at bedtime PRN Insomnia  ondansetron Injectable 4 milliGRAM(s) IV Push every 8 hours PRN Nausea and/or Vomiting  traMADol 50 milliGRAM(s) Oral every 8 hours PRN Moderate Pain (4 - 6)      Allergies    iodine (Short breath; Swelling)  Avelox (Short breath; Pruritus)  shellfish (Anaphylaxis)  aspirin (Short breath)  Dilaudid (Short breath)  codeine (Short breath)  cefepime (Anaphylaxis)  penicillin (Anaphylaxis)  tetanus toxoid (Short breath)  Valium (Short breath)    Intolerances        Vital Signs Last 24 Hrs  T(C): 36.9 (09 May 2023 08:22), Max: 36.9 (09 May 2023 08:22)  T(F): 98.4 (09 May 2023 08:22), Max: 98.4 (09 May 2023 08:22)  HR: 79 (09 May 2023 08:22) (72 - 82)  BP: 132/67 (09 May 2023 08:22) (132/67 - 160/87)  BP(mean): --  RR: 18 (09 May 2023 08:22) (18 - 18)  SpO2: 95% (09 May 2023 08:22) (95% - 99%)    Parameters below as of 09 May 2023 08:22  Patient On (Oxygen Delivery Method): room air        PHYSICAL EXAMINATION:    NECK:  Supple. No lymphadenopathy. Jugular venous pressure not elevated.   HEART:   The cardiac impulse has a normal quality. Reg., Nl S1 and S2.    CHEST:  Chest with scattered wheezes and loose rhonchi. Normal respiratory effort.  ABDOMEN:  Soft and nontender.   EXTREMITIES:  There is no edema.       LABS:                        10.9   12.91 )-----------( 241      ( 09 May 2023 08:25 )             37.0     05-09    140  |  106  |  18  ----------------------------<  179<H>  4.1   |  29  |  0.68    Ca    8.5      09 May 2023 08:25    TPro  5.9<L>  /  Alb  2.7<L>  /  TBili  0.3  /  DBili  x   /  AST  20  /  ALT  25  /  AlkPhos  77  05-09          RADIOLOGY & ADDITIONAL TESTS:        Assessment and Recommendation:     Assessment/Plan    - Decrease solumedrol at 40MG Q6H for another 24 hrs  - Aerosols with Duoneb/Budesonide  - Sputum C+S, fungus  - Mucomyst 10% TID  - Chest PT/Smart Vest  - Singulair  - Monitor O2 Sats  - OOB to chair    Problem/Recommendation - 1:  ·  Acute respiratory failure with hypoxia.   Problem/Recommendation - 2:  ·  Severe asthma.   Problem/Recommendation - 3:  ·  Dyspnea.   Problem/Recommendation - 4:  ·  Bronchiectasis.   Problem/Recommendation - 5:  ·  Parainfluenza infection.   Problem/Recommendation - 6:  ·  Tracheobronchomalacia.

## 2023-05-09 NOTE — DIETITIAN INITIAL EVALUATION ADULT - ORAL INTAKE PTA/DIET HISTORY
Pt lives at [facility]. On CHO consistent diet; however, reports that pt "doesn't restrict herself." Likely w/ fair po intake 50-75% of ENN 2/2 worsening productive cough, fever, SOB. Utilizes free style chin to monitor BGL; dm meds - lantus and humalog.

## 2023-05-09 NOTE — DIETITIAN NUTRITION RISK NOTIFICATION - TREATMENT: THE FOLLOWING DIET HAS BEEN RECOMMENDED
Diet, Regular:   Supplement Feeding Modality:  Oral  Glucerna Shake Cans or Servings Per Day:  1       Frequency:  Three Times a day (05-06-23 @ 12:48) [Active]

## 2023-05-09 NOTE — DIETITIAN INITIAL EVALUATION ADULT - PERTINENT MEDS FT
MEDICATIONS  (STANDING):  acetylcysteine 10%  Inhalation 4 milliLiter(s) Inhalation three times a day  albuterol/ipratropium for Nebulization 3 milliLiter(s) Nebulizer every 6 hours  albuterol/ipratropium for Nebulization 3 milliLiter(s) Nebulizer every 6 hours  apixaban 5 milliGRAM(s) Oral two times a day  buDESOnide    Inhalation Suspension 0.5 milliGRAM(s) Inhalation every 12 hours  dextrose 5%. 1000 milliLiter(s) (50 mL/Hr) IV Continuous <Continuous>  dextrose 5%. 1000 milliLiter(s) (100 mL/Hr) IV Continuous <Continuous>  dextrose 50% Injectable 25 Gram(s) IV Push once  dextrose 50% Injectable 12.5 Gram(s) IV Push once  dextrose 50% Injectable 25 Gram(s) IV Push once  fluconAZOLE   Tablet 100 milliGRAM(s) Oral daily  glucagon  Injectable 1 milliGRAM(s) IntraMuscular once  insulin glargine Injectable (LANTUS) 30 Unit(s) SubCutaneous at bedtime  insulin lispro (ADMELOG) corrective regimen sliding scale   SubCutaneous at bedtime  insulin lispro (ADMELOG) corrective regimen sliding scale   SubCutaneous three times a day before meals  magnesium oxide 400 milliGRAM(s) Oral two times a day with meals  methylPREDNISolone sodium succinate Injectable 40 milliGRAM(s) IV Push every 6 hours  mexiletine 200 milliGRAM(s) Oral every 8 hours  montelukast 10 milliGRAM(s) Oral at bedtime  nystatin    Suspension 762062 Unit(s) Oral every 6 hours  pantoprazole    Tablet 40 milliGRAM(s) Oral before breakfast  polyethylene glycol 3350 17 Gram(s) Oral daily    MEDICATIONS  (PRN):  acetaminophen     Tablet .. 650 milliGRAM(s) Oral every 6 hours PRN Temp greater or equal to 38C (100.4F), Mild Pain (1 - 3)  albuterol    90 MICROgram(s) HFA Inhaler 1 Puff(s) Inhalation four times a day PRN Shortness of Breath and/or Wheezing  albuterol    90 MICROgram(s) HFA Inhaler 2 Puff(s) Inhalation every 4 hours PRN Shortness of Breath  aluminum hydroxide/magnesium hydroxide/simethicone Suspension 30 milliLiter(s) Oral every 4 hours PRN Dyspepsia  dextrose Oral Gel 15 Gram(s) Oral once PRN Blood Glucose LESS THAN 70 milliGRAM(s)/deciliter  guaifenesin/dextromethorphan Oral Liquid 10 milliLiter(s) Oral every 4 hours PRN Cough  melatonin 3 milliGRAM(s) Oral at bedtime PRN Insomnia  ondansetron Injectable 4 milliGRAM(s) IV Push every 8 hours PRN Nausea and/or Vomiting  traMADol 50 milliGRAM(s) Oral every 8 hours PRN Moderate Pain (4 - 6)

## 2023-05-09 NOTE — PROGRESS NOTE ADULT - SUBJECTIVE AND OBJECTIVE BOX
: sob better  no cp  on O2 2 L nc    : says that she is feeling less terrible today; sob better  asking for glucerna 3 times a day, added  refused to take diabetic diet, wants regular diet    : feeling better  Off O2; 97% room air    : c/o feet and ankle edema but refusing any diuretics for it    : edema better  feeling better  solumedrol decreased to 40 q 6 hrs    PHYSICAL EXAM:    Vital Signs Last 24 Hrs  T(C): 36.9 (09 May 2023 08:22), Max: 36.9 (09 May 2023 08:22)  T(F): 98.4 (09 May 2023 08:22), Max: 98.4 (09 May 2023 08:22)  HR: 80 (09 May 2023 10:00) (72 - 82)  BP: 132/67 (09 May 2023 08:22) (132/67 - 160/87)  BP(mean): --  RR: 18 (09 May 2023 08:22) (18 - 18)  SpO2: 95% (09 May 2023 08:22) (95% - 99%)    Parameters below as of 09 May 2023 08:22  Patient On (Oxygen Delivery Method): room air          Constitutional: Weak appearing  HEENT: Atraumatic, EBER,   Respiratory: Breath Sounds normal, no rhonchi/wheeze  Cardiovascular: N S1S2;   Gastrointestinal: Abdomen soft, non tender, Bowel Sounds present  Extremities: trace lower leg/feet edema, peripheral pulses present  Neurological: AAO x 3, no gross focal motor deficits  Skin: Non cellulitic, no rash, ulcers  Lymph Nodes: No lymphadenopathy noted  Back: No CVA tenderness   Musculoskeletal: non tender  Breasts: Deferred  Genitourinary: deferred  Rectal: Deferred    All Labs/EKG/Radiology/Meds reviewed by me                          11.8   8.07  )-----------( 181      ( 05 May 2023 06:21 )             42.0       CBC Full  -  ( 05 May 2023 06:21 )  WBC Count : 8.07 K/uL  RBC Count : 5.51 M/uL  Hemoglobin : 11.8 g/dL  Hematocrit : 42.0 %  Platelet Count - Automated : 181 K/uL  Mean Cell Volume : 76.2 fl  Mean Cell Hemoglobin : 21.4 pg  Mean Cell Hemoglobin Concentration : 28.1 gm/dL  Auto Neutrophil # : x  Auto Lymphocyte # : x  Auto Monocyte # : x  Auto Eosinophil # : x  Auto Basophil # : x  Auto Neutrophil % : x  Auto Lymphocyte % : x  Auto Monocyte % : x    Culture - Sputum (collected 05 May 2023 21:53)  Source: .Sputum Sputum  Gram Stain (06 May 2023 07:44):    Few polymorphonuclear leukocytes per low power field    Few Squamous epithelial cells per low power field    Moderate Gram Positive Rods seen per oil power field    Culture - Urine (collected 04 May 2023 13:31)  Source: Clean Catch Clean Catch (Midstream)  Final Report (06 May 2023 10:42):    <10,000 CFU/mL Normal Urogenital Jessica    Culture - Blood (collected 04 May 2023 10:15)  Source: .Blood None  Preliminary Report (05 May 2023 16:01):    No growth to date.    Culture - Blood (collected 04 May 2023 09:51)  Source: .Blood Blood-Peripheral  Preliminary Report (05 May 2023 16:01):    No growth to date.    Auto Eosinophil % : x  Auto Basophil % : x          137  |  107  |  12  ----------------------------<  248<H>  4.9   |  23  |  0.56    Ca    8.7      05 May 2023 06:21    TPro  7.6  /  Alb  3.2<L>  /  TBili  0.3  /  DBili  x   /  AST  27  /  ALT  21  /  AlkPhos  104  05-04      LIVER FUNCTIONS - ( 04 May 2023 09:51 )  Alb: 3.2 g/dL / Pro: 7.6 gm/dL / ALK PHOS: 104 U/L / ALT: 21 U/L / AST: 27 U/L / GGT: x                       Urinalysis Basic - ( 04 May 2023 13:31 )    Color: Yellow / Appearance: Clear / S.015 / pH: x  Gluc: x / Ketone: Small  / Bili: Negative / Urobili: Negative   Blood: x / Protein: Negative / Nitrite: Negative   Leuk Esterase: Negative / RBC: 6-10 /HPF / WBC 3-5 /HPF   Sq Epi: x / Non Sq Epi: x / Bacteria: Occasional        < from: CT Chest No Cont (23 @ 13:36) >  Increased infectious or inflammatory bronchitis/bronchiolitis since   2023.    < end of copied text >      MEDICATIONS  (STANDING):  acetylcysteine 10%  Inhalation 4 milliLiter(s) Inhalation three times a day  albuterol/ipratropium for Nebulization 3 milliLiter(s) Nebulizer every 6 hours  albuterol/ipratropium for Nebulization 3 milliLiter(s) Nebulizer every 6 hours  apixaban 5 milliGRAM(s) Oral two times a day  buDESOnide    Inhalation Suspension 0.5 milliGRAM(s) Inhalation every 12 hours  dextrose 5%. 1000 milliLiter(s) (100 mL/Hr) IV Continuous <Continuous>  dextrose 5%. 1000 milliLiter(s) (50 mL/Hr) IV Continuous <Continuous>  dextrose 50% Injectable 25 Gram(s) IV Push once  dextrose 50% Injectable 12.5 Gram(s) IV Push once  dextrose 50% Injectable 25 Gram(s) IV Push once  fluconAZOLE   Tablet 100 milliGRAM(s) Oral daily  glucagon  Injectable 1 milliGRAM(s) IntraMuscular once  insulin glargine Injectable (LANTUS) 25 Unit(s) SubCutaneous at bedtime  insulin lispro (ADMELOG) corrective regimen sliding scale   SubCutaneous three times a day before meals  insulin lispro (ADMELOG) corrective regimen sliding scale   SubCutaneous at bedtime  magnesium oxide 400 milliGRAM(s) Oral two times a day with meals  methylPREDNISolone sodium succinate Injectable 40 milliGRAM(s) IV Push every 6 hours  mexiletine 200 milliGRAM(s) Oral every 8 hours  montelukast 10 milliGRAM(s) Oral at bedtime  polyethylene glycol 3350 17 Gram(s) Oral daily      MEDICATIONS  (PRN):  acetaminophen     Tablet .. 650 milliGRAM(s) Oral every 6 hours PRN Temp greater or equal to 38C (100.4F), Mild Pain (1 - 3)  albuterol    90 MICROgram(s) HFA Inhaler 1 Puff(s) Inhalation four times a day PRN Shortness of Breath and/or Wheezing  albuterol    90 MICROgram(s) HFA Inhaler 2 Puff(s) Inhalation every 4 hours PRN Shortness of Breath  aluminum hydroxide/magnesium hydroxide/simethicone Suspension 30 milliLiter(s) Oral every 4 hours PRN Dyspepsia  benzonatate 100 milliGRAM(s) Oral every 8 hours PRN Cough  dextrose Oral Gel 15 Gram(s) Oral once PRN Blood Glucose LESS THAN 70 milliGRAM(s)/deciliter  guaifenesin/dextromethorphan Oral Liquid 10 milliLiter(s) Oral every 4 hours PRN Cough  melatonin 3 milliGRAM(s) Oral at bedtime PRN Insomnia  ondansetron Injectable 4 milliGRAM(s) IV Push every 8 hours PRN Nausea and/or Vomiting  traMADol 50 milliGRAM(s) Oral every 8 hours PRN Moderate Pain (4 - 6)

## 2023-05-09 NOTE — DIETITIAN INITIAL EVALUATION ADULT - NSFNSGIIOFT_GEN_A_CORE
05-08-23 @ 07:01  -  05-09-23 @ 07:00  --------------------------------------------------------  OUT:    Ileostomy (mL): 1 mL  Total OUT: 1 mL    Total NET: -1 mL

## 2023-05-09 NOTE — DIETITIAN INITIAL EVALUATION ADULT - ADD RECOMMEND
1) Continue w/ regular diet as tolerated  2) Add Glucerna TID (provides 220kcal, 10g protein)  3) Monitor bowel movements, if no BM for >3 days, consider implementing bowel regimen.  4) Encourage protein-rich foods, maximize food preferences  5) MVI w/ minerals daily to ensure 100% RDA met  6) Consider adding thiamine 100 mg daily 2/2 poor PO intake/ malnutrition  7) Maintain -180mg/dL   8) Encourage f/u with outpatient dietitian and endocrinologist for diabetes management  9) Confirm goals of care regarding nutrition support  10) Suggest SLP consult; on soft and bite sized diet previously   RD will continue to monitor PO intake, labs, hydration, and wt prn.

## 2023-05-09 NOTE — DIETITIAN INITIAL EVALUATION ADULT - NSICDXPASTSURGICALHX_GEN_ALL_CORE_FT
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    H/O pelvic surgery 5 years ago - s/p fracture    H/O total knee replacement, bilateral 5 years ago    History of partial hysterectomy 30 years ago - fibroids    History of sinus surgery multiple sinus surgeries    History of tracheomalacia 2015 - attempted tracheal stenting ([facility])- course complicated by obstruction, respiratory failure, multiple CPR attempts -  stent discontinued; 10/20/2016 Tracheobronchoplasty (Prolene Mesh) performed at [facility] by Dr Zapien    Rectal bleeding exam under anesthesia ([facility]) 2/2018    S/P bronchoscopy 6/5/2018 - [facility] (Dr Zapien) no evidence of tracheobronchomalacia in trachea or bronchial tubes

## 2023-05-09 NOTE — DIETITIAN INITIAL EVALUATION ADULT - PERTINENT LABORATORY DATA
05-09    140  |  106  |  18  ----------------------------<  179<H>  4.1   |  29  |  0.68    Ca    8.5      09 May 2023 08:25    TPro  5.9<L>  /  Alb  2.7<L>  /  TBili  0.3  /  DBili  x   /  AST  20  /  ALT  25  /  AlkPhos  77  05-09  POCT Blood Glucose.: 340 mg/dL (05-09-23 @ 11:57)  A1C with Estimated Average Glucose Result: 10.3 % (03-05-23 @ 06:35)  A1C with Estimated Average Glucose Result: 8.6 % (12-15-22 @ 06:19)  A1C with Estimated Average Glucose Result: 8.3 % (12-14-22 @ 11:19)

## 2023-05-10 LAB
GLUCOSE BLDC GLUCOMTR-MCNC: 185 MG/DL — HIGH (ref 70–99)
GLUCOSE BLDC GLUCOMTR-MCNC: 300 MG/DL — HIGH (ref 70–99)
GLUCOSE BLDC GLUCOMTR-MCNC: 399 MG/DL — HIGH (ref 70–99)
GLUCOSE BLDC GLUCOMTR-MCNC: 74 MG/DL — SIGNIFICANT CHANGE UP (ref 70–99)

## 2023-05-10 PROCEDURE — 99233 SBSQ HOSP IP/OBS HIGH 50: CPT

## 2023-05-10 PROCEDURE — 99232 SBSQ HOSP IP/OBS MODERATE 35: CPT

## 2023-05-10 RX ADMIN — Medication 500000 UNIT(S): at 10:44

## 2023-05-10 RX ADMIN — POLYETHYLENE GLYCOL 3350 17 GRAM(S): 17 POWDER, FOR SOLUTION ORAL at 10:44

## 2023-05-10 RX ADMIN — Medication 30 MILLILITER(S): at 22:04

## 2023-05-10 RX ADMIN — Medication 500000 UNIT(S): at 23:09

## 2023-05-10 RX ADMIN — Medication 2: at 22:54

## 2023-05-10 RX ADMIN — Medication 0.5 MILLIGRAM(S): at 11:16

## 2023-05-10 RX ADMIN — PANTOPRAZOLE SODIUM 40 MILLIGRAM(S): 20 TABLET, DELAYED RELEASE ORAL at 06:04

## 2023-05-10 RX ADMIN — FLUCONAZOLE 100 MILLIGRAM(S): 150 TABLET ORAL at 10:44

## 2023-05-10 RX ADMIN — MONTELUKAST 10 MILLIGRAM(S): 4 TABLET, CHEWABLE ORAL at 22:57

## 2023-05-10 RX ADMIN — Medication 3 MILLILITER(S): at 22:00

## 2023-05-10 RX ADMIN — MAGNESIUM OXIDE 400 MG ORAL TABLET 400 MILLIGRAM(S): 241.3 TABLET ORAL at 08:24

## 2023-05-10 RX ADMIN — MEXILETINE HYDROCHLORIDE 200 MILLIGRAM(S): 150 CAPSULE ORAL at 22:55

## 2023-05-10 RX ADMIN — Medication 4 MILLILITER(S): at 14:25

## 2023-05-10 RX ADMIN — Medication 0.5 MILLIGRAM(S): at 22:00

## 2023-05-10 RX ADMIN — MEXILETINE HYDROCHLORIDE 200 MILLIGRAM(S): 150 CAPSULE ORAL at 06:04

## 2023-05-10 RX ADMIN — Medication 3 MILLILITER(S): at 11:16

## 2023-05-10 RX ADMIN — APIXABAN 5 MILLIGRAM(S): 2.5 TABLET, FILM COATED ORAL at 10:44

## 2023-05-10 RX ADMIN — Medication 4 MILLILITER(S): at 11:16

## 2023-05-10 RX ADMIN — INSULIN GLARGINE 30 UNIT(S): 100 INJECTION, SOLUTION SUBCUTANEOUS at 22:54

## 2023-05-10 RX ADMIN — MAGNESIUM OXIDE 400 MG ORAL TABLET 400 MILLIGRAM(S): 241.3 TABLET ORAL at 17:45

## 2023-05-10 RX ADMIN — Medication 40 MILLIGRAM(S): at 22:57

## 2023-05-10 RX ADMIN — Medication 10: at 17:44

## 2023-05-10 RX ADMIN — Medication 40 MILLIGRAM(S): at 13:02

## 2023-05-10 RX ADMIN — MEXILETINE HYDROCHLORIDE 200 MILLIGRAM(S): 150 CAPSULE ORAL at 13:02

## 2023-05-10 RX ADMIN — Medication 2: at 13:01

## 2023-05-10 RX ADMIN — Medication 40 MILLIGRAM(S): at 06:04

## 2023-05-10 RX ADMIN — Medication 500000 UNIT(S): at 17:45

## 2023-05-10 RX ADMIN — Medication 4 MILLILITER(S): at 22:00

## 2023-05-10 RX ADMIN — APIXABAN 5 MILLIGRAM(S): 2.5 TABLET, FILM COATED ORAL at 22:55

## 2023-05-10 RX ADMIN — Medication 500000 UNIT(S): at 06:04

## 2023-05-10 RX ADMIN — Medication 3 MILLILITER(S): at 14:25

## 2023-05-10 NOTE — PROGRESS NOTE ADULT - SUBJECTIVE AND OBJECTIVE BOX
: sob better  no cp  on O2 2 L nc    : says that she is feeling less terrible today; sob better  asking for glucerna 3 times a day, added  refused to take diabetic diet, wants regular diet    : feeling better  Off O2; 97% room air    : c/o feet and ankle edema but refusing any diuretics for it    : edema better  feeling better  solumedrol decreased to 40 q 6 hrs    5/10: feeling better  decreased solumedrol to 40 q 8    PHYSICAL EXAM:    Vital Signs Last 24 Hrs  T(C): 36.5 (10 May 2023 08:38), Max: 37 (09 May 2023 16:10)  T(F): 97.7 (10 May 2023 08:38), Max: 98.6 (09 May 2023 16:10)  HR: 76 (10 May 2023 15:05) (64 - 82)  BP: 140/70 (10 May 2023 08:38) (140/70 - 167/76)  BP(mean): --  RR: 17 (10 May 2023 08:38) (17 - 18)  SpO2: 95% (10 May 2023 08:38) (92% - 96%)    Parameters below as of 10 May 2023 08:38  Patient On (Oxygen Delivery Method): room air      Constitutional: Weak appearing  HEENT: Atraumatic, EBER,   Respiratory: Breath Sounds normal, no rhonchi/wheeze  Cardiovascular: N S1S2;   Gastrointestinal: Abdomen soft, non tender, Bowel Sounds present  Extremities: trace lower leg/feet edema, peripheral pulses present  Neurological: AAO x 3, no gross focal motor deficits  Skin: Non cellulitic, no rash, ulcers  Lymph Nodes: No lymphadenopathy noted  Back: No CVA tenderness   Musculoskeletal: non tender  Breasts: Deferred  Genitourinary: deferred  Rectal: Deferred    All Labs/EKG/Radiology/Meds reviewed by me                          11.8   8.07  )-----------( 181      ( 05 May 2023 06:21 )             42.0       CBC Full  -  ( 05 May 2023 06:21 )  WBC Count : 8.07 K/uL  RBC Count : 5.51 M/uL  Hemoglobin : 11.8 g/dL  Hematocrit : 42.0 %  Platelet Count - Automated : 181 K/uL  Mean Cell Volume : 76.2 fl  Mean Cell Hemoglobin : 21.4 pg  Mean Cell Hemoglobin Concentration : 28.1 gm/dL  Auto Neutrophil # : x  Auto Lymphocyte # : x  Auto Monocyte # : x  Auto Eosinophil # : x  Auto Basophil # : x  Auto Neutrophil % : x  Auto Lymphocyte % : x  Auto Monocyte % : x    Culture - Sputum (collected 05 May 2023 21:53)  Source: .Sputum Sputum  Gram Stain (06 May 2023 07:44):    Few polymorphonuclear leukocytes per low power field    Few Squamous epithelial cells per low power field    Moderate Gram Positive Rods seen per oil power field    Culture - Urine (collected 04 May 2023 13:31)  Source: Clean Catch Clean Catch (Midstream)  Final Report (06 May 2023 10:42):    <10,000 CFU/mL Normal Urogenital Jessica    Culture - Blood (collected 04 May 2023 10:15)  Source: .Blood None  Preliminary Report (05 May 2023 16:01):    No growth to date.    Culture - Blood (collected 04 May 2023 09:51)  Source: .Blood Blood-Peripheral  Preliminary Report (05 May 2023 16:01):    No growth to date.    Auto Eosinophil % : x  Auto Basophil % : x          137  |  107  |  12  ----------------------------<  248<H>  4.9   |  23  |  0.56    Ca    8.7      05 May 2023 06:21    TPro  7.6  /  Alb  3.2<L>  /  TBili  0.3  /  DBili  x   /  AST  27  /  ALT  21  /  AlkPhos  104  05-04      LIVER FUNCTIONS - ( 04 May 2023 09:51 )  Alb: 3.2 g/dL / Pro: 7.6 gm/dL / ALK PHOS: 104 U/L / ALT: 21 U/L / AST: 27 U/L / GGT: x                       Urinalysis Basic - ( 04 May 2023 13:31 )    Color: Yellow / Appearance: Clear / S.015 / pH: x  Gluc: x / Ketone: Small  / Bili: Negative / Urobili: Negative   Blood: x / Protein: Negative / Nitrite: Negative   Leuk Esterase: Negative / RBC: 6-10 /HPF / WBC 3-5 /HPF   Sq Epi: x / Non Sq Epi: x / Bacteria: Occasional        < from: CT Chest No Cont (23 @ 13:36) >  Increased infectious or inflammatory bronchitis/bronchiolitis since   2023.    < end of copied text >      MEDICATIONS  (STANDING):  acetylcysteine 10%  Inhalation 4 milliLiter(s) Inhalation three times a day  albuterol/ipratropium for Nebulization 3 milliLiter(s) Nebulizer every 6 hours  albuterol/ipratropium for Nebulization 3 milliLiter(s) Nebulizer every 6 hours  apixaban 5 milliGRAM(s) Oral two times a day  buDESOnide    Inhalation Suspension 0.5 milliGRAM(s) Inhalation every 12 hours  dextrose 5%. 1000 milliLiter(s) (100 mL/Hr) IV Continuous <Continuous>  dextrose 5%. 1000 milliLiter(s) (50 mL/Hr) IV Continuous <Continuous>  dextrose 50% Injectable 25 Gram(s) IV Push once  dextrose 50% Injectable 12.5 Gram(s) IV Push once  dextrose 50% Injectable 25 Gram(s) IV Push once  fluconAZOLE   Tablet 100 milliGRAM(s) Oral daily  glucagon  Injectable 1 milliGRAM(s) IntraMuscular once  insulin glargine Injectable (LANTUS) 25 Unit(s) SubCutaneous at bedtime  insulin lispro (ADMELOG) corrective regimen sliding scale   SubCutaneous three times a day before meals  insulin lispro (ADMELOG) corrective regimen sliding scale   SubCutaneous at bedtime  magnesium oxide 400 milliGRAM(s) Oral two times a day with meals  methylPREDNISolone sodium succinate Injectable 40 milliGRAM(s) IV Push every 6 hours  mexiletine 200 milliGRAM(s) Oral every 8 hours  montelukast 10 milliGRAM(s) Oral at bedtime  polyethylene glycol 3350 17 Gram(s) Oral daily      MEDICATIONS  (PRN):  acetaminophen     Tablet .. 650 milliGRAM(s) Oral every 6 hours PRN Temp greater or equal to 38C (100.4F), Mild Pain (1 - 3)  albuterol    90 MICROgram(s) HFA Inhaler 1 Puff(s) Inhalation four times a day PRN Shortness of Breath and/or Wheezing  albuterol    90 MICROgram(s) HFA Inhaler 2 Puff(s) Inhalation every 4 hours PRN Shortness of Breath  aluminum hydroxide/magnesium hydroxide/simethicone Suspension 30 milliLiter(s) Oral every 4 hours PRN Dyspepsia  benzonatate 100 milliGRAM(s) Oral every 8 hours PRN Cough  dextrose Oral Gel 15 Gram(s) Oral once PRN Blood Glucose LESS THAN 70 milliGRAM(s)/deciliter  guaifenesin/dextromethorphan Oral Liquid 10 milliLiter(s) Oral every 4 hours PRN Cough  melatonin 3 milliGRAM(s) Oral at bedtime PRN Insomnia  ondansetron Injectable 4 milliGRAM(s) IV Push every 8 hours PRN Nausea and/or Vomiting  traMADol 50 milliGRAM(s) Oral every 8 hours PRN Moderate Pain (4 - 6)

## 2023-05-10 NOTE — PROGRESS NOTE ADULT - SUBJECTIVE AND OBJECTIVE BOX
Subjective:    Awake, alert. Improving with less cough and dyspnea. Ambulating in room.    MEDICATIONS  (STANDING):  acetylcysteine 10%  Inhalation 4 milliLiter(s) Inhalation three times a day  albuterol/ipratropium for Nebulization 3 milliLiter(s) Nebulizer every 6 hours  albuterol/ipratropium for Nebulization 3 milliLiter(s) Nebulizer every 6 hours  apixaban 5 milliGRAM(s) Oral two times a day  buDESOnide    Inhalation Suspension 0.5 milliGRAM(s) Inhalation every 12 hours  dextrose 5%. 1000 milliLiter(s) (100 mL/Hr) IV Continuous <Continuous>  dextrose 5%. 1000 milliLiter(s) (50 mL/Hr) IV Continuous <Continuous>  dextrose 50% Injectable 25 Gram(s) IV Push once  dextrose 50% Injectable 12.5 Gram(s) IV Push once  dextrose 50% Injectable 25 Gram(s) IV Push once  fluconAZOLE   Tablet 100 milliGRAM(s) Oral daily  glucagon  Injectable 1 milliGRAM(s) IntraMuscular once  insulin glargine Injectable (LANTUS) 30 Unit(s) SubCutaneous at bedtime  insulin lispro (ADMELOG) corrective regimen sliding scale   SubCutaneous three times a day before meals  insulin lispro (ADMELOG) corrective regimen sliding scale   SubCutaneous at bedtime  magnesium oxide 400 milliGRAM(s) Oral two times a day with meals  methylPREDNISolone sodium succinate Injectable 40 milliGRAM(s) IV Push every 8 hours  mexiletine 200 milliGRAM(s) Oral every 8 hours  montelukast 10 milliGRAM(s) Oral at bedtime  nystatin    Suspension 931873 Unit(s) Oral every 6 hours  pantoprazole    Tablet 40 milliGRAM(s) Oral before breakfast  polyethylene glycol 3350 17 Gram(s) Oral daily    MEDICATIONS  (PRN):  acetaminophen     Tablet .. 650 milliGRAM(s) Oral every 6 hours PRN Temp greater or equal to 38C (100.4F), Mild Pain (1 - 3)  albuterol    90 MICROgram(s) HFA Inhaler 1 Puff(s) Inhalation four times a day PRN Shortness of Breath and/or Wheezing  albuterol    90 MICROgram(s) HFA Inhaler 2 Puff(s) Inhalation every 4 hours PRN Shortness of Breath  aluminum hydroxide/magnesium hydroxide/simethicone Suspension 30 milliLiter(s) Oral every 4 hours PRN Dyspepsia  dextrose Oral Gel 15 Gram(s) Oral once PRN Blood Glucose LESS THAN 70 milliGRAM(s)/deciliter  guaifenesin/dextromethorphan Oral Liquid 10 milliLiter(s) Oral every 4 hours PRN Cough  melatonin 3 milliGRAM(s) Oral at bedtime PRN Insomnia  ondansetron Injectable 4 milliGRAM(s) IV Push every 8 hours PRN Nausea and/or Vomiting  traMADol 50 milliGRAM(s) Oral every 8 hours PRN Moderate Pain (4 - 6)      Allergies    iodine (Short breath; Swelling)  Avelox (Short breath; Pruritus)  shellfish (Anaphylaxis)  aspirin (Short breath)  Dilaudid (Short breath)  codeine (Short breath)  cefepime (Anaphylaxis)  penicillin (Anaphylaxis)  tetanus toxoid (Short breath)  Valium (Short breath)    Intolerances        Vital Signs Last 24 Hrs  T(C): 36.5 (10 May 2023 08:38), Max: 37 (09 May 2023 16:10)  T(F): 97.7 (10 May 2023 08:38), Max: 98.6 (09 May 2023 16:10)  HR: 66 (10 May 2023 08:38) (64 - 82)  BP: 140/70 (10 May 2023 08:38) (140/70 - 167/76)  BP(mean): --  RR: 17 (10 May 2023 08:38) (17 - 18)  SpO2: 95% (10 May 2023 08:38) (92% - 96%)    Parameters below as of 10 May 2023 08:38  Patient On (Oxygen Delivery Method): room air        PHYSICAL EXAMINATION:    NECK:  Supple. No lymphadenopathy. Jugular venous pressure not elevated.   HEART:   The cardiac impulse has a normal quality. Reg., Nl S1 and S2.    CHEST:  Chest with diffuse insp./exp. wheezes. Sl. improvement in air entry.  Normal respiratory effort.  ABDOMEN:  Soft and nontender.   EXTREMITIES:  There is tr edema.       LABS:                        10.9   12.91 )-----------( 241      ( 09 May 2023 08:25 )             37.0     05-09    140  |  106  |  18  ----------------------------<  179<H>  4.1   |  29  |  0.68    Ca    8.5      09 May 2023 08:25    TPro  5.9<L>  /  Alb  2.7<L>  /  TBili  0.3  /  DBili  x   /  AST  20  /  ALT  25  /  AlkPhos  77  05-09          RADIOLOGY & ADDITIONAL TESTS:    Assessment/Plan    - Decrease solumedrol to 40MG Q8H   - Aerosols with Duoneb/Budesonide  - Sputum C+S, fungus  - Mucomyst 10% TID  - Chest PT/Smart Vest  - Singulair  - Monitor O2 Sats  - OOB to chair    Problem/Recommendation - 1:  ·  Acute respiratory failure with hypoxia.   Problem/Recommendation - 2:  ·  Severe asthma.   Problem/Recommendation - 3:  ·  Dyspnea.   Problem/Recommendation - 4:  ·  Bronchiectasis.   Problem/Recommendation - 5:  ·  Parainfluenza infection.   Problem/Recommendation - 6:  ·  Tracheobronchomalacia.

## 2023-05-11 LAB
GLUCOSE BLDC GLUCOMTR-MCNC: 147 MG/DL — HIGH (ref 70–99)
GLUCOSE BLDC GLUCOMTR-MCNC: 224 MG/DL — HIGH (ref 70–99)
GLUCOSE BLDC GLUCOMTR-MCNC: 322 MG/DL — HIGH (ref 70–99)
GLUCOSE BLDC GLUCOMTR-MCNC: 348 MG/DL — HIGH (ref 70–99)

## 2023-05-11 PROCEDURE — 99233 SBSQ HOSP IP/OBS HIGH 50: CPT

## 2023-05-11 PROCEDURE — 99232 SBSQ HOSP IP/OBS MODERATE 35: CPT

## 2023-05-11 RX ORDER — SIMETHICONE 80 MG/1
80 TABLET, CHEWABLE ORAL ONCE
Refills: 0 | Status: COMPLETED | OUTPATIENT
Start: 2023-05-11 | End: 2023-05-11

## 2023-05-11 RX ORDER — SIMETHICONE 80 MG/1
80 TABLET, CHEWABLE ORAL THREE TIMES A DAY
Refills: 0 | Status: DISCONTINUED | OUTPATIENT
Start: 2023-05-11 | End: 2023-05-22

## 2023-05-11 RX ADMIN — POLYETHYLENE GLYCOL 3350 17 GRAM(S): 17 POWDER, FOR SOLUTION ORAL at 10:06

## 2023-05-11 RX ADMIN — Medication 8: at 17:13

## 2023-05-11 RX ADMIN — Medication 4 MILLILITER(S): at 09:12

## 2023-05-11 RX ADMIN — Medication 3 MILLILITER(S): at 20:39

## 2023-05-11 RX ADMIN — Medication 40 MILLIGRAM(S): at 06:07

## 2023-05-11 RX ADMIN — Medication 4 MILLILITER(S): at 20:39

## 2023-05-11 RX ADMIN — MAGNESIUM OXIDE 400 MG ORAL TABLET 400 MILLIGRAM(S): 241.3 TABLET ORAL at 17:14

## 2023-05-11 RX ADMIN — MEXILETINE HYDROCHLORIDE 200 MILLIGRAM(S): 150 CAPSULE ORAL at 14:04

## 2023-05-11 RX ADMIN — Medication 3 MILLIGRAM(S): at 21:34

## 2023-05-11 RX ADMIN — MEXILETINE HYDROCHLORIDE 200 MILLIGRAM(S): 150 CAPSULE ORAL at 06:06

## 2023-05-11 RX ADMIN — Medication 0.5 MILLIGRAM(S): at 20:39

## 2023-05-11 RX ADMIN — SIMETHICONE 80 MILLIGRAM(S): 80 TABLET, CHEWABLE ORAL at 11:30

## 2023-05-11 RX ADMIN — SIMETHICONE 80 MILLIGRAM(S): 80 TABLET, CHEWABLE ORAL at 06:06

## 2023-05-11 RX ADMIN — PANTOPRAZOLE SODIUM 40 MILLIGRAM(S): 20 TABLET, DELAYED RELEASE ORAL at 06:07

## 2023-05-11 RX ADMIN — FLUCONAZOLE 100 MILLIGRAM(S): 150 TABLET ORAL at 10:06

## 2023-05-11 RX ADMIN — Medication 500000 UNIT(S): at 17:14

## 2023-05-11 RX ADMIN — Medication 3 MILLILITER(S): at 09:11

## 2023-05-11 RX ADMIN — Medication 20 MILLIGRAM(S): at 14:07

## 2023-05-11 RX ADMIN — SIMETHICONE 80 MILLIGRAM(S): 80 TABLET, CHEWABLE ORAL at 19:47

## 2023-05-11 RX ADMIN — Medication 500000 UNIT(S): at 11:31

## 2023-05-11 RX ADMIN — Medication 4 MILLILITER(S): at 14:11

## 2023-05-11 RX ADMIN — INSULIN GLARGINE 30 UNIT(S): 100 INJECTION, SOLUTION SUBCUTANEOUS at 21:33

## 2023-05-11 RX ADMIN — MAGNESIUM OXIDE 400 MG ORAL TABLET 400 MILLIGRAM(S): 241.3 TABLET ORAL at 10:07

## 2023-05-11 RX ADMIN — MEXILETINE HYDROCHLORIDE 200 MILLIGRAM(S): 150 CAPSULE ORAL at 21:34

## 2023-05-11 RX ADMIN — APIXABAN 5 MILLIGRAM(S): 2.5 TABLET, FILM COATED ORAL at 21:41

## 2023-05-11 RX ADMIN — Medication 30 MILLILITER(S): at 21:55

## 2023-05-11 RX ADMIN — Medication 4: at 08:17

## 2023-05-11 RX ADMIN — Medication 0.5 MILLIGRAM(S): at 09:12

## 2023-05-11 RX ADMIN — Medication 3 MILLILITER(S): at 14:11

## 2023-05-11 RX ADMIN — MONTELUKAST 10 MILLIGRAM(S): 4 TABLET, CHEWABLE ORAL at 21:34

## 2023-05-11 RX ADMIN — Medication 500000 UNIT(S): at 06:06

## 2023-05-11 RX ADMIN — APIXABAN 5 MILLIGRAM(S): 2.5 TABLET, FILM COATED ORAL at 10:06

## 2023-05-11 RX ADMIN — Medication 20 MILLIGRAM(S): at 21:34

## 2023-05-11 RX ADMIN — Medication 500000 UNIT(S): at 21:56

## 2023-05-11 NOTE — PROGRESS NOTE ADULT - SUBJECTIVE AND OBJECTIVE BOX
5/5: sob better  no cp  on O2 2 L nc    5/6: says that she is feeling less terrible today; sob better  asking for glucerna 3 times a day, added  refused to take diabetic diet, wants regular diet    5/7: feeling better  Off O2; 97% room air    5/8: c/o feet and ankle edema but refusing any diuretics for it    5/9: edema better  feeling better  solumedrol decreased to 40 q 6 hrs    5/10: feeling better  decreased solumedrol to 40 q 8    5/11: solumedrol decreased to 20 mg q 8  c/o abd gas ; simethicone added  feeling better  remains OFF O2    PHYSICAL EXAM:    Vital Signs Last 24 Hrs  T(C): 36.6 (11 May 2023 08:25), Max: 36.9 (10 May 2023 16:03)  T(F): 97.9 (11 May 2023 08:25), Max: 98.4 (10 May 2023 16:03)  HR: 87 (11 May 2023 14:15) (72 - 96)  BP: 143/77 (11 May 2023 08:25) (138/51 - 147/72)  BP(mean): --  RR: 18 (11 May 2023 08:25) (18 - 18)  SpO2: 97% (11 May 2023 14:15) (95% - 97%)    Parameters below as of 11 May 2023 14:15  Patient On (Oxygen Delivery Method): room air          Constitutional: Well appearing  HEENT: Atraumatic, EBER,   Respiratory: Breath Sounds normal, no rhonchi/wheeze  Cardiovascular: N S1S2;   Gastrointestinal: Abdomen soft, non tender, Bowel Sounds present, ostomy +  Extremities: trace lower leg/feet edema, peripheral pulses present  Neurological: AAO x 3, no gross focal motor deficits  Skin: Non cellulitic, no rash, ulcers  Lymph Nodes: No lymphadenopathy noted  Back: No CVA tenderness   Musculoskeletal: non tender  Breasts: Deferred  Genitourinary: deferred  Rectal: Deferred    All Labs/EKG/Radiology/Meds reviewed by me.     Lab Results:  CBC    .		Differential:	[] Automated		[] Manual  Chemistry                        MICROBIOLOGY/CULTURES:  Culture Results:   Normal Respiratory Jessica present (05-05 @ 21:53)      RADIOLOGY RESULTS:    MEDICATIONS  (STANDING):  acetylcysteine 10%  Inhalation 4 milliLiter(s) Inhalation three times a day  albuterol/ipratropium for Nebulization 3 milliLiter(s) Nebulizer every 6 hours  albuterol/ipratropium for Nebulization 3 milliLiter(s) Nebulizer every 6 hours  apixaban 5 milliGRAM(s) Oral two times a day  buDESOnide    Inhalation Suspension 0.5 milliGRAM(s) Inhalation every 12 hours  dextrose 5%. 1000 milliLiter(s) (50 mL/Hr) IV Continuous <Continuous>  dextrose 5%. 1000 milliLiter(s) (100 mL/Hr) IV Continuous <Continuous>  dextrose 50% Injectable 25 Gram(s) IV Push once  dextrose 50% Injectable 12.5 Gram(s) IV Push once  dextrose 50% Injectable 25 Gram(s) IV Push once  fluconAZOLE   Tablet 100 milliGRAM(s) Oral daily  glucagon  Injectable 1 milliGRAM(s) IntraMuscular once  insulin glargine Injectable (LANTUS) 30 Unit(s) SubCutaneous at bedtime  insulin lispro (ADMELOG) corrective regimen sliding scale   SubCutaneous three times a day before meals  insulin lispro (ADMELOG) corrective regimen sliding scale   SubCutaneous at bedtime  magnesium oxide 400 milliGRAM(s) Oral two times a day with meals  methylPREDNISolone sodium succinate Injectable 20 milliGRAM(s) IV Push every 8 hours  mexiletine 200 milliGRAM(s) Oral every 8 hours  montelukast 10 milliGRAM(s) Oral at bedtime  nystatin    Suspension 465235 Unit(s) Oral every 6 hours  pantoprazole    Tablet 40 milliGRAM(s) Oral before breakfast  polyethylene glycol 3350 17 Gram(s) Oral daily    MEDICATIONS  (PRN):  acetaminophen     Tablet .. 650 milliGRAM(s) Oral every 6 hours PRN Temp greater or equal to 38C (100.4F), Mild Pain (1 - 3)  albuterol    90 MICROgram(s) HFA Inhaler 1 Puff(s) Inhalation four times a day PRN Shortness of Breath and/or Wheezing  albuterol    90 MICROgram(s) HFA Inhaler 2 Puff(s) Inhalation every 4 hours PRN Shortness of Breath  aluminum hydroxide/magnesium hydroxide/simethicone Suspension 30 milliLiter(s) Oral every 4 hours PRN Dyspepsia  dextrose Oral Gel 15 Gram(s) Oral once PRN Blood Glucose LESS THAN 70 milliGRAM(s)/deciliter  guaifenesin/dextromethorphan Oral Liquid 10 milliLiter(s) Oral every 4 hours PRN Cough  melatonin 3 milliGRAM(s) Oral at bedtime PRN Insomnia  ondansetron Injectable 4 milliGRAM(s) IV Push every 8 hours PRN Nausea and/or Vomiting  simethicone 80 milliGRAM(s) Chew three times a day PRN Gas  traMADol 50 milliGRAM(s) Oral every 8 hours PRN Moderate Pain (4 - 6)

## 2023-05-11 NOTE — PROGRESS NOTE ADULT - ASSESSMENT
72F w/PMH TIA, DVT, Colorectal CA s/p tx, Tracheobronchomalacia s/p Tracheobronchoplasty 2016 (no further evidence), adrenal insufficiency on steroids, COPD/Asthma, PAfib/AC, T2DM, recent admit 2 mos ago,  presents from NH for fever x2 days.  +worsening productive cough and sob.   In ED, MD d/w ID, rec to remove picc line (to be done by ED), given vanco iV, 1L ivf, duoneb, +febrile, CT chest: Increased infectious or inflammatory bronchitis/bronchiolitis since   4/5/2023.      #Sepsis as evidenced by hypotension, fever : No PNA  Parainfluenza Bronchiolitis  # Hx of Bronchiectasis  # Oral thrush  #Asthma exacerbation  - ICU eval for hypotension appreciated; BP responded to iv fluids  - duonebs, alb inh prn  - pulm cs  - ID cs  - d/judi  meropenem/vanco  - antitussives  -monitor labs, vitals   solumedrol 20 mg q 8 hrs  diflucan  smart vest for chest PTx  sputum gram stain +, but Cx grew normal val  monitor clinically     # C/o Abd Gas: simethicone added    #T2DM  - resume home dose insulin  ISS  - monitor FS  changed diet to [facility] diet but refusing it and asking for regular diet.   Glucerna 1 can  tid    #Parox Afib, TIA  - c/w eliquis    # DVT PPX: on Eliquis      poc discussed with pt, team, Dr. Wilian MEHTA and advance care planning meeting done with the patient. She wishes to be fully resuscitated and mechanically ventilated if need arises. There are no limitations of any sort in her medical care and management. She is FULL CODE.    DISPO : slow tapering of iv steroids

## 2023-05-11 NOTE — PROGRESS NOTE ADULT - SUBJECTIVE AND OBJECTIVE BOX
Subjective:    Awake, alert. Continues to improve.    MEDICATIONS  (STANDING):  acetylcysteine 10%  Inhalation 4 milliLiter(s) Inhalation three times a day  albuterol/ipratropium for Nebulization 3 milliLiter(s) Nebulizer every 6 hours  albuterol/ipratropium for Nebulization 3 milliLiter(s) Nebulizer every 6 hours  apixaban 5 milliGRAM(s) Oral two times a day  buDESOnide    Inhalation Suspension 0.5 milliGRAM(s) Inhalation every 12 hours  dextrose 5%. 1000 milliLiter(s) (50 mL/Hr) IV Continuous <Continuous>  dextrose 5%. 1000 milliLiter(s) (100 mL/Hr) IV Continuous <Continuous>  dextrose 50% Injectable 25 Gram(s) IV Push once  dextrose 50% Injectable 12.5 Gram(s) IV Push once  dextrose 50% Injectable 25 Gram(s) IV Push once  fluconAZOLE   Tablet 100 milliGRAM(s) Oral daily  glucagon  Injectable 1 milliGRAM(s) IntraMuscular once  insulin glargine Injectable (LANTUS) 30 Unit(s) SubCutaneous at bedtime  insulin lispro (ADMELOG) corrective regimen sliding scale   SubCutaneous three times a day before meals  insulin lispro (ADMELOG) corrective regimen sliding scale   SubCutaneous at bedtime  magnesium oxide 400 milliGRAM(s) Oral two times a day with meals  methylPREDNISolone sodium succinate Injectable 20 milliGRAM(s) IV Push every 8 hours  mexiletine 200 milliGRAM(s) Oral every 8 hours  montelukast 10 milliGRAM(s) Oral at bedtime  nystatin    Suspension 469935 Unit(s) Oral every 6 hours  pantoprazole    Tablet 40 milliGRAM(s) Oral before breakfast  polyethylene glycol 3350 17 Gram(s) Oral daily    MEDICATIONS  (PRN):  acetaminophen     Tablet .. 650 milliGRAM(s) Oral every 6 hours PRN Temp greater or equal to 38C (100.4F), Mild Pain (1 - 3)  albuterol    90 MICROgram(s) HFA Inhaler 1 Puff(s) Inhalation four times a day PRN Shortness of Breath and/or Wheezing  albuterol    90 MICROgram(s) HFA Inhaler 2 Puff(s) Inhalation every 4 hours PRN Shortness of Breath  aluminum hydroxide/magnesium hydroxide/simethicone Suspension 30 milliLiter(s) Oral every 4 hours PRN Dyspepsia  dextrose Oral Gel 15 Gram(s) Oral once PRN Blood Glucose LESS THAN 70 milliGRAM(s)/deciliter  guaifenesin/dextromethorphan Oral Liquid 10 milliLiter(s) Oral every 4 hours PRN Cough  melatonin 3 milliGRAM(s) Oral at bedtime PRN Insomnia  ondansetron Injectable 4 milliGRAM(s) IV Push every 8 hours PRN Nausea and/or Vomiting  traMADol 50 milliGRAM(s) Oral every 8 hours PRN Moderate Pain (4 - 6)      Allergies    iodine (Short breath; Swelling)  Avelox (Short breath; Pruritus)  shellfish (Anaphylaxis)  aspirin (Short breath)  Dilaudid (Short breath)  codeine (Short breath)  cefepime (Anaphylaxis)  penicillin (Anaphylaxis)  tetanus toxoid (Short breath)  Valium (Short breath)    Intolerances        Vital Signs Last 24 Hrs  T(C): 36.6 (11 May 2023 08:25), Max: 36.9 (10 May 2023 16:03)  T(F): 97.9 (11 May 2023 08:25), Max: 98.4 (10 May 2023 16:03)  HR: 96 (11 May 2023 09:15) (72 - 96)  BP: 143/77 (11 May 2023 08:25) (138/51 - 147/72)  BP(mean): --  RR: 18 (11 May 2023 08:25) (18 - 18)  SpO2: 97% (11 May 2023 09:15) (95% - 97%)    Parameters below as of 11 May 2023 09:15  Patient On (Oxygen Delivery Method): room air        PHYSICAL EXAMINATION:    NECK:  Supple. No lymphadenopathy. Jugular venous pressure not elevated.   HEART:   The cardiac impulse has a normal quality. Reg., Nl S1 and S2.    CHEST:  Chest with fair air entry. Sl decrease in diffuse wheezing. Normal respiratory effort.  ABDOMEN:  Soft and nontender.   EXTREMITIES:  There is no edema.       LABS:                RADIOLOGY & ADDITIONAL TESTS:    Assessment/Plan    - Decrease solumedrol to 20MG Q8H   - Aerosols with Duoneb/Budesonide  - Mucomyst 10% TID  - Chest PT/Smart Vest  - Singulair  - Monitor O2 Sats  - OOB to chair    Problem/Recommendation - 1:  ·  Acute respiratory failure with hypoxia.   Problem/Recommendation - 2:  ·  Severe asthma.   Problem/Recommendation - 3:  ·  Dyspnea.   Problem/Recommendation - 4:  ·  Bronchiectasis.   Problem/Recommendation - 5:  ·  Parainfluenza infection.   Problem/Recommendation - 6:  ·  Tracheobronchomalacia.

## 2023-05-12 LAB
A1C WITH ESTIMATED AVERAGE GLUCOSE RESULT: 9.4 % — HIGH (ref 4–5.6)
ANION GAP SERPL CALC-SCNC: 5 MMOL/L — SIGNIFICANT CHANGE UP (ref 5–17)
BUN SERPL-MCNC: 19 MG/DL — SIGNIFICANT CHANGE UP (ref 7–23)
CALCIUM SERPL-MCNC: 8.4 MG/DL — LOW (ref 8.5–10.1)
CHLORIDE SERPL-SCNC: 104 MMOL/L — SIGNIFICANT CHANGE UP (ref 96–108)
CO2 SERPL-SCNC: 30 MMOL/L — SIGNIFICANT CHANGE UP (ref 22–31)
CREAT SERPL-MCNC: 0.76 MG/DL — SIGNIFICANT CHANGE UP (ref 0.5–1.3)
EGFR: 82 ML/MIN/1.73M2 — SIGNIFICANT CHANGE UP
ESTIMATED AVERAGE GLUCOSE: 223 MG/DL — HIGH (ref 68–114)
GLUCOSE BLDC GLUCOMTR-MCNC: 194 MG/DL — HIGH (ref 70–99)
GLUCOSE BLDC GLUCOMTR-MCNC: 294 MG/DL — HIGH (ref 70–99)
GLUCOSE BLDC GLUCOMTR-MCNC: 332 MG/DL — HIGH (ref 70–99)
GLUCOSE BLDC GLUCOMTR-MCNC: 374 MG/DL — HIGH (ref 70–99)
GLUCOSE SERPL-MCNC: 232 MG/DL — HIGH (ref 70–99)
HCT VFR BLD CALC: 37.1 % — SIGNIFICANT CHANGE UP (ref 34.5–45)
HGB BLD-MCNC: 11.1 G/DL — LOW (ref 11.5–15.5)
MCHC RBC-ENTMCNC: 21.6 PG — LOW (ref 27–34)
MCHC RBC-ENTMCNC: 29.9 GM/DL — LOW (ref 32–36)
MCV RBC AUTO: 72 FL — LOW (ref 80–100)
PLATELET # BLD AUTO: 218 K/UL — SIGNIFICANT CHANGE UP (ref 150–400)
POTASSIUM SERPL-MCNC: 4.8 MMOL/L — SIGNIFICANT CHANGE UP (ref 3.5–5.3)
POTASSIUM SERPL-SCNC: 4.8 MMOL/L — SIGNIFICANT CHANGE UP (ref 3.5–5.3)
RBC # BLD: 5.15 M/UL — SIGNIFICANT CHANGE UP (ref 3.8–5.2)
RBC # FLD: 23.5 % — HIGH (ref 10.3–14.5)
SODIUM SERPL-SCNC: 139 MMOL/L — SIGNIFICANT CHANGE UP (ref 135–145)
WBC # BLD: 23.68 K/UL — HIGH (ref 3.8–10.5)
WBC # FLD AUTO: 23.68 K/UL — HIGH (ref 3.8–10.5)

## 2023-05-12 PROCEDURE — 99232 SBSQ HOSP IP/OBS MODERATE 35: CPT

## 2023-05-12 PROCEDURE — 99233 SBSQ HOSP IP/OBS HIGH 50: CPT

## 2023-05-12 RX ORDER — FAMOTIDINE 10 MG/ML
20 INJECTION INTRAVENOUS ONCE
Refills: 0 | Status: COMPLETED | OUTPATIENT
Start: 2023-05-12 | End: 2023-05-12

## 2023-05-12 RX ADMIN — MAGNESIUM OXIDE 400 MG ORAL TABLET 400 MILLIGRAM(S): 241.3 TABLET ORAL at 09:04

## 2023-05-12 RX ADMIN — Medication 6: at 21:14

## 2023-05-12 RX ADMIN — MEXILETINE HYDROCHLORIDE 200 MILLIGRAM(S): 150 CAPSULE ORAL at 06:49

## 2023-05-12 RX ADMIN — FAMOTIDINE 20 MILLIGRAM(S): 10 INJECTION INTRAVENOUS at 17:52

## 2023-05-12 RX ADMIN — SIMETHICONE 80 MILLIGRAM(S): 80 TABLET, CHEWABLE ORAL at 21:08

## 2023-05-12 RX ADMIN — MAGNESIUM OXIDE 400 MG ORAL TABLET 400 MILLIGRAM(S): 241.3 TABLET ORAL at 17:52

## 2023-05-12 RX ADMIN — Medication 500000 UNIT(S): at 06:49

## 2023-05-12 RX ADMIN — Medication 30 MILLILITER(S): at 21:09

## 2023-05-12 RX ADMIN — Medication 2: at 09:11

## 2023-05-12 RX ADMIN — Medication 3 MILLILITER(S): at 09:39

## 2023-05-12 RX ADMIN — Medication 20 MILLIGRAM(S): at 06:49

## 2023-05-12 RX ADMIN — Medication 0.5 MILLIGRAM(S): at 22:18

## 2023-05-12 RX ADMIN — PANTOPRAZOLE SODIUM 40 MILLIGRAM(S): 20 TABLET, DELAYED RELEASE ORAL at 06:49

## 2023-05-12 RX ADMIN — APIXABAN 5 MILLIGRAM(S): 2.5 TABLET, FILM COATED ORAL at 21:07

## 2023-05-12 RX ADMIN — Medication 20 MILLIGRAM(S): at 21:08

## 2023-05-12 RX ADMIN — Medication 500000 UNIT(S): at 17:52

## 2023-05-12 RX ADMIN — Medication 3 MILLILITER(S): at 22:18

## 2023-05-12 RX ADMIN — POLYETHYLENE GLYCOL 3350 17 GRAM(S): 17 POWDER, FOR SOLUTION ORAL at 09:04

## 2023-05-12 RX ADMIN — Medication 0.5 MILLIGRAM(S): at 09:39

## 2023-05-12 RX ADMIN — MONTELUKAST 10 MILLIGRAM(S): 4 TABLET, CHEWABLE ORAL at 21:07

## 2023-05-12 RX ADMIN — Medication 20 MILLIGRAM(S): at 13:18

## 2023-05-12 RX ADMIN — INSULIN GLARGINE 30 UNIT(S): 100 INJECTION, SOLUTION SUBCUTANEOUS at 21:09

## 2023-05-12 RX ADMIN — Medication 3 MILLILITER(S): at 14:28

## 2023-05-12 RX ADMIN — MEXILETINE HYDROCHLORIDE 200 MILLIGRAM(S): 150 CAPSULE ORAL at 21:08

## 2023-05-12 RX ADMIN — Medication 8: at 13:17

## 2023-05-12 RX ADMIN — Medication 4 MILLILITER(S): at 09:40

## 2023-05-12 RX ADMIN — MEXILETINE HYDROCHLORIDE 200 MILLIGRAM(S): 150 CAPSULE ORAL at 13:18

## 2023-05-12 RX ADMIN — Medication 6: at 17:52

## 2023-05-12 RX ADMIN — APIXABAN 5 MILLIGRAM(S): 2.5 TABLET, FILM COATED ORAL at 09:04

## 2023-05-12 RX ADMIN — Medication 4 MILLILITER(S): at 22:17

## 2023-05-12 RX ADMIN — FLUCONAZOLE 100 MILLIGRAM(S): 150 TABLET ORAL at 09:04

## 2023-05-12 RX ADMIN — Medication 500000 UNIT(S): at 13:18

## 2023-05-12 RX ADMIN — Medication 4 MILLILITER(S): at 14:30

## 2023-05-12 NOTE — PROGRESS NOTE ADULT - SUBJECTIVE AND OBJECTIVE BOX
Cheif complaints and Diagnosis: bronchiolitis/ asthma    Subjective: patient burping, alot of gas      REVIEW OF SYSTEMS:    CONSTITUTIONAL: No weakness, fevers or chills  EYES/ENT: No visual changes;  No vertigo or throat pain   NECK: No pain or stiffness  RESPIRATORY: No cough, wheezing, hemoptysis; No shortness of breath  CARDIOVASCULAR: No chest pain or palpitations  GASTROINTESTINAL: No abdominal or epigastric pain. No nausea, vomiting, or hematemesis; No diarrhea or constipation. No melena or hematochezia.  GENITOURINARY: No dysuria, frequency or hematuria  NEUROLOGICAL: No numbness or weakness  SKIN: No itching, burning, rashes, or lesions   All other review of systems is negative unless indicated above      Vital Signs Last 24 Hrs  T(C): 36.4 (12 May 2023 08:11), Max: 36.9 (11 May 2023 16:41)  T(F): 97.5 (12 May 2023 08:11), Max: 98.4 (11 May 2023 16:41)  HR: 77 (12 May 2023 14:41) (77 - 87)  BP: 114/63 (12 May 2023 08:11) (114/63 - 123/68)  BP(mean): --  RR: 18 (12 May 2023 08:11) (18 - 18)  SpO2: 97% (12 May 2023 08:11) (95% - 98%)    Parameters below as of 12 May 2023 09:17  Patient On (Oxygen Delivery Method): room air        HEENT:   pupils equal and reactive, EOMI, no oropharyngeal lesions, erythema, exudates, oral thrush    NECK:   supple, no carotid bruits, no palpable lymph nodes, no thyromegaly    CV:  +S1, +S2, regular, no murmurs or rubs    RESP:   lungs clear to auscultation bilaterally, no wheezing, rales, rhonchi, good air entry bilaterally    BREAST:  not examined    GI:  abdomen soft, non-tender, non-distended, normal BS, no bruits, no abdominal masses, no palpable masses    RECTAL:  not examined    :  not examined    MSK:   normal muscle tone, no atrophy, no rigidity, no contractions    EXT:   no clubbing, no cyanosis, no edema, no calf pain, swelling or erythema    VASCULAR:  pulses equal and symmetric in the upper and lower extremities    NEURO:  AAOX3, no focal neurological deficits, follows all commands, able to move extremities spontaneously    SKIN:  no ulcers, lesions or rashes    MEDICATIONS  (STANDING):  acetylcysteine 10%  Inhalation 4 milliLiter(s) Inhalation three times a day  albuterol/ipratropium for Nebulization 3 milliLiter(s) Nebulizer every 6 hours  apixaban 5 milliGRAM(s) Oral two times a day  buDESOnide    Inhalation Suspension 0.5 milliGRAM(s) Inhalation every 12 hours  dextrose 5%. 1000 milliLiter(s) (100 mL/Hr) IV Continuous <Continuous>  dextrose 5%. 1000 milliLiter(s) (50 mL/Hr) IV Continuous <Continuous>  dextrose 50% Injectable 25 Gram(s) IV Push once  dextrose 50% Injectable 12.5 Gram(s) IV Push once  dextrose 50% Injectable 25 Gram(s) IV Push once  famotidine Injectable 20 milliGRAM(s) IV Push once  fluconAZOLE   Tablet 100 milliGRAM(s) Oral daily  glucagon  Injectable 1 milliGRAM(s) IntraMuscular once  insulin glargine Injectable (LANTUS) 30 Unit(s) SubCutaneous at bedtime  insulin lispro (ADMELOG) corrective regimen sliding scale   SubCutaneous three times a day before meals  insulin lispro (ADMELOG) corrective regimen sliding scale   SubCutaneous at bedtime  magnesium oxide 400 milliGRAM(s) Oral two times a day with meals  methylPREDNISolone sodium succinate Injectable 20 milliGRAM(s) IV Push every 8 hours  mexiletine 200 milliGRAM(s) Oral every 8 hours  montelukast 10 milliGRAM(s) Oral at bedtime  nystatin    Suspension [id number] Unit(s) Oral every 6 hours  pantoprazole    Tablet 40 milliGRAM(s) Oral before breakfast  polyethylene glycol 3350 17 Gram(s) Oral daily    MEDICATIONS  (PRN):  acetaminophen     Tablet .. 650 milliGRAM(s) Oral every 6 hours PRN Temp greater or equal to 38C (100.4F), Mild Pain (1 - 3)  albuterol    90 MICROgram(s) HFA Inhaler 1 Puff(s) Inhalation four times a day PRN Shortness of Breath and/or Wheezing  albuterol    90 MICROgram(s) HFA Inhaler 2 Puff(s) Inhalation every 4 hours PRN Shortness of Breath  aluminum hydroxide/magnesium hydroxide/simethicone Suspension 30 milliLiter(s) Oral every 4 hours PRN Dyspepsia  aluminum hydroxide/magnesium hydroxide/simethicone Suspension 30 milliLiter(s) Oral every 4 hours PRN Dyspepsia  dextrose Oral Gel 15 Gram(s) Oral once PRN Blood Glucose LESS THAN 70 milliGRAM(s)/deciliter  guaifenesin/dextromethorphan Oral Liquid 10 milliLiter(s) Oral every 4 hours PRN Cough  melatonin 3 milliGRAM(s) Oral at bedtime PRN Insomnia  ondansetron Injectable 4 milliGRAM(s) IV Push every 8 hours PRN Nausea and/or Vomiting  simethicone 80 milliGRAM(s) Chew three times a day PRN Gas      12 May 2023 07:40    139    |  104    |  19     ----------------------------<  232    4.8     |  30     |  0.76     Ca    8.4        12 May 2023 07:40    CBC Full  -  ( 12 May 2023 07:40 )  WBC Count : 23.68 K/uL  Hemoglobin : 11.1 g/dL  Hematocrit : 37.1 %  Platelet Count - Automated : 218 K/uL  Mean Cell Volume : 72.0 fl  Mean Cell Hemoglobin : 21.6 pg  Mean Cell Hemoglobin Concentration : 29.9 gm/dL  Auto Neutrophil # : x  Auto Lymphocyte # : x  Auto Monocyte # : x  Auto Eosinophil # : x  Auto Basophil # : x  Auto Neutrophil % : x  Auto Lymphocyte % : x  Auto Monocyte % : x  Auto Eosinophil % : x  Auto Basophil % : x                Assessment and Plan:     72F w/PMH TIA, DVT, Colorectal CA s/p tx, Tracheobronchomalacia s/p Tracheobronchoplasty 2016 (no further evidence), adrenal insufficiency on steroids, COPD/Asthma, PAfib/AC, T2DM, recent admit 2 mos ago,  presents from NH for fever x2 days.  +worsening productive cough and sob.   In ED, MD d/w ID, rec to remove picc line (to be done by ED), given vanco iV, 1L ivf, duoneb, +febrile, CT chest: Increased infectious or inflammatory bronchitis/bronchiolitis since   4/5/2023.      #Sepsis as evidenced by hypotension, fever : No PNA  Parainfluenza Bronchiolitis  # Hx of Bronchiectasis  # Oral thrush  #Asthma exacerbation  - ICU eval for hypotension appreciated; BP responded to iv fluids  - duonebs, alb inh prn  - pulm cs  - ID cs  - d/judi  meropenem/vanco  - antitussives  -monitor labs, vitals   solumedrol 20 mg q 8 hrs  diflucan  smart vest for chest PTx  sputum gram stain +, but Cx grew normal val  monitor clinically     # C/o Abd Gas: simethicone added    #T2DM  - resume home dose insulin  ISS  - monitor FS  changed diet to [facility] diet but refusing it and asking for regular diet.   Glucerna 1 can  tid    #Parox Afib, TIA  - c/w eliquis    # DVT PPX: on Eliquis      poc discussed with pt, team, Dr. Wilian MEHTA and advance care planning meeting done with the patient. She wishes to be fully resuscitated and mechanically ventilated if need arises. There are no limitations of any sort in her medical care and management. She is FULL CODE.

## 2023-05-12 NOTE — PROGRESS NOTE ADULT - SUBJECTIVE AND OBJECTIVE BOX
Subjective:    Awake, alert. No new pulmonary complaints.    MEDICATIONS  (STANDING):  acetylcysteine 10%  Inhalation 4 milliLiter(s) Inhalation three times a day  albuterol/ipratropium for Nebulization 3 milliLiter(s) Nebulizer every 6 hours  albuterol/ipratropium for Nebulization 3 milliLiter(s) Nebulizer every 6 hours  apixaban 5 milliGRAM(s) Oral two times a day  buDESOnide    Inhalation Suspension 0.5 milliGRAM(s) Inhalation every 12 hours  dextrose 5%. 1000 milliLiter(s) (100 mL/Hr) IV Continuous <Continuous>  dextrose 5%. 1000 milliLiter(s) (50 mL/Hr) IV Continuous <Continuous>  dextrose 50% Injectable 25 Gram(s) IV Push once  dextrose 50% Injectable 12.5 Gram(s) IV Push once  dextrose 50% Injectable 25 Gram(s) IV Push once  fluconAZOLE   Tablet 100 milliGRAM(s) Oral daily  glucagon  Injectable 1 milliGRAM(s) IntraMuscular once  insulin glargine Injectable (LANTUS) 30 Unit(s) SubCutaneous at bedtime  insulin lispro (ADMELOG) corrective regimen sliding scale   SubCutaneous three times a day before meals  insulin lispro (ADMELOG) corrective regimen sliding scale   SubCutaneous at bedtime  magnesium oxide 400 milliGRAM(s) Oral two times a day with meals  methylPREDNISolone sodium succinate Injectable 20 milliGRAM(s) IV Push every 8 hours  mexiletine 200 milliGRAM(s) Oral every 8 hours  montelukast 10 milliGRAM(s) Oral at bedtime  nystatin    Suspension 365422 Unit(s) Oral every 6 hours  pantoprazole    Tablet 40 milliGRAM(s) Oral before breakfast  polyethylene glycol 3350 17 Gram(s) Oral daily    MEDICATIONS  (PRN):  acetaminophen     Tablet .. 650 milliGRAM(s) Oral every 6 hours PRN Temp greater or equal to 38C (100.4F), Mild Pain (1 - 3)  albuterol    90 MICROgram(s) HFA Inhaler 1 Puff(s) Inhalation four times a day PRN Shortness of Breath and/or Wheezing  albuterol    90 MICROgram(s) HFA Inhaler 2 Puff(s) Inhalation every 4 hours PRN Shortness of Breath  aluminum hydroxide/magnesium hydroxide/simethicone Suspension 30 milliLiter(s) Oral every 4 hours PRN Dyspepsia  dextrose Oral Gel 15 Gram(s) Oral once PRN Blood Glucose LESS THAN 70 milliGRAM(s)/deciliter  guaifenesin/dextromethorphan Oral Liquid 10 milliLiter(s) Oral every 4 hours PRN Cough  melatonin 3 milliGRAM(s) Oral at bedtime PRN Insomnia  ondansetron Injectable 4 milliGRAM(s) IV Push every 8 hours PRN Nausea and/or Vomiting  simethicone 80 milliGRAM(s) Chew three times a day PRN Gas      Allergies    iodine (Short breath; Swelling)  Avelox (Short breath; Pruritus)  shellfish (Anaphylaxis)  aspirin (Short breath)  Dilaudid (Short breath)  codeine (Short breath)  cefepime (Anaphylaxis)  penicillin (Anaphylaxis)  tetanus toxoid (Short breath)  Valium (Short breath)    Intolerances        Vital Signs Last 24 Hrs  T(C): 36.4 (12 May 2023 08:11), Max: 36.9 (11 May 2023 16:41)  T(F): 97.5 (12 May 2023 08:11), Max: 98.4 (11 May 2023 16:41)  HR: 77 (12 May 2023 08:11) (77 - 96)  BP: 114/63 (12 May 2023 08:11) (114/63 - 123/68)  BP(mean): --  RR: 18 (12 May 2023 08:11) (18 - 18)  SpO2: 97% (12 May 2023 08:11) (95% - 98%)    Parameters below as of 12 May 2023 08:11  Patient On (Oxygen Delivery Method): room air        PHYSICAL EXAMINATION:    NECK:  Supple. No lymphadenopathy. Jugular venous pressure not elevated.  HEART:   The cardiac impulse has a normal quality. Reg., Nl S1 and S2.    CHEST:  Chest with scattered wheezes and rhonchi. Normal respiratory effort.  ABDOMEN:  Soft and nontender.   EXTREMITIES:  There is no edema.       LABS:                        11.1   23.68 )-----------( 218      ( 12 May 2023 07:40 )             37.1     05-12    139  |  104  |  19  ----------------------------<  232<H>  4.8   |  30  |  0.76    Ca    8.4<L>      12 May 2023 07:40            RADIOLOGY & ADDITIONAL TESTS:    Assessment/Plan    - Maintain solumedrol at 20MG Q8H today-taper as tolerated over next few days  - Aerosols with Duoneb/Budesonide  - Mucomyst 10% TID  - Chest PT/Smart Vest  - Singulair  - Monitor O2 Sats  - OOB to chair    Problem/Recommendation - 1:  ·  Acute respiratory failure with hypoxia.   Problem/Recommendation - 2:  ·  Severe asthma.   Problem/Recommendation - 3:  ·  Dyspnea.   Problem/Recommendation - 4:  ·  Bronchiectasis.   Problem/Recommendation - 5:  ·  Parainfluenza infection.   Problem/Recommendation - 6:  ·  Tracheobronchomalacia.

## 2023-05-13 LAB
GLUCOSE BLDC GLUCOMTR-MCNC: 173 MG/DL — HIGH (ref 70–99)
GLUCOSE BLDC GLUCOMTR-MCNC: 295 MG/DL — HIGH (ref 70–99)
GLUCOSE BLDC GLUCOMTR-MCNC: 347 MG/DL — HIGH (ref 70–99)
GLUCOSE BLDC GLUCOMTR-MCNC: 364 MG/DL — HIGH (ref 70–99)

## 2023-05-13 PROCEDURE — 99232 SBSQ HOSP IP/OBS MODERATE 35: CPT

## 2023-05-13 PROCEDURE — 99233 SBSQ HOSP IP/OBS HIGH 50: CPT

## 2023-05-13 RX ORDER — FAMOTIDINE 10 MG/ML
20 INJECTION INTRAVENOUS DAILY
Refills: 0 | Status: DISCONTINUED | OUTPATIENT
Start: 2023-05-13 | End: 2023-05-17

## 2023-05-13 RX ADMIN — APIXABAN 5 MILLIGRAM(S): 2.5 TABLET, FILM COATED ORAL at 10:14

## 2023-05-13 RX ADMIN — APIXABAN 5 MILLIGRAM(S): 2.5 TABLET, FILM COATED ORAL at 22:35

## 2023-05-13 RX ADMIN — Medication 4 MILLILITER(S): at 14:14

## 2023-05-13 RX ADMIN — Medication 3 MILLILITER(S): at 08:56

## 2023-05-13 RX ADMIN — Medication 500000 UNIT(S): at 23:14

## 2023-05-13 RX ADMIN — Medication 30 MILLILITER(S): at 02:17

## 2023-05-13 RX ADMIN — MEXILETINE HYDROCHLORIDE 200 MILLIGRAM(S): 150 CAPSULE ORAL at 22:35

## 2023-05-13 RX ADMIN — MAGNESIUM OXIDE 400 MG ORAL TABLET 400 MILLIGRAM(S): 241.3 TABLET ORAL at 08:50

## 2023-05-13 RX ADMIN — Medication 500000 UNIT(S): at 06:28

## 2023-05-13 RX ADMIN — Medication 0.5 MILLIGRAM(S): at 20:33

## 2023-05-13 RX ADMIN — MAGNESIUM OXIDE 400 MG ORAL TABLET 400 MILLIGRAM(S): 241.3 TABLET ORAL at 17:37

## 2023-05-13 RX ADMIN — Medication 30 MILLILITER(S): at 15:04

## 2023-05-13 RX ADMIN — Medication 30 MILLILITER(S): at 06:29

## 2023-05-13 RX ADMIN — MEXILETINE HYDROCHLORIDE 200 MILLIGRAM(S): 150 CAPSULE ORAL at 13:11

## 2023-05-13 RX ADMIN — Medication 500000 UNIT(S): at 13:11

## 2023-05-13 RX ADMIN — Medication 4 MILLILITER(S): at 08:56

## 2023-05-13 RX ADMIN — Medication 3 MILLILITER(S): at 20:33

## 2023-05-13 RX ADMIN — Medication 500000 UNIT(S): at 17:37

## 2023-05-13 RX ADMIN — Medication 4 MILLILITER(S): at 20:33

## 2023-05-13 RX ADMIN — MONTELUKAST 10 MILLIGRAM(S): 4 TABLET, CHEWABLE ORAL at 22:37

## 2023-05-13 RX ADMIN — Medication 10 MILLILITER(S): at 22:40

## 2023-05-13 RX ADMIN — Medication 2: at 08:48

## 2023-05-13 RX ADMIN — POLYETHYLENE GLYCOL 3350 17 GRAM(S): 17 POWDER, FOR SOLUTION ORAL at 10:14

## 2023-05-13 RX ADMIN — Medication 8: at 13:14

## 2023-05-13 RX ADMIN — MEXILETINE HYDROCHLORIDE 200 MILLIGRAM(S): 150 CAPSULE ORAL at 06:29

## 2023-05-13 RX ADMIN — Medication 0.5 MILLIGRAM(S): at 08:57

## 2023-05-13 RX ADMIN — Medication 20 MILLIGRAM(S): at 06:28

## 2023-05-13 RX ADMIN — FAMOTIDINE 20 MILLIGRAM(S): 10 INJECTION INTRAVENOUS at 13:11

## 2023-05-13 RX ADMIN — PANTOPRAZOLE SODIUM 40 MILLIGRAM(S): 20 TABLET, DELAYED RELEASE ORAL at 06:28

## 2023-05-13 RX ADMIN — FLUCONAZOLE 100 MILLIGRAM(S): 150 TABLET ORAL at 10:15

## 2023-05-13 RX ADMIN — Medication 30 MILLILITER(S): at 18:51

## 2023-05-13 RX ADMIN — SIMETHICONE 80 MILLIGRAM(S): 80 TABLET, CHEWABLE ORAL at 02:17

## 2023-05-13 RX ADMIN — Medication 6: at 22:35

## 2023-05-13 RX ADMIN — Medication 500000 UNIT(S): at 00:42

## 2023-05-13 RX ADMIN — Medication 6: at 18:01

## 2023-05-13 RX ADMIN — INSULIN GLARGINE 30 UNIT(S): 100 INJECTION, SOLUTION SUBCUTANEOUS at 22:35

## 2023-05-13 RX ADMIN — Medication 20 MILLIGRAM(S): at 22:34

## 2023-05-13 RX ADMIN — SIMETHICONE 80 MILLIGRAM(S): 80 TABLET, CHEWABLE ORAL at 10:15

## 2023-05-13 RX ADMIN — Medication 20 MILLIGRAM(S): at 13:12

## 2023-05-13 RX ADMIN — Medication 3 MILLILITER(S): at 14:12

## 2023-05-13 NOTE — PROGRESS NOTE ADULT - SUBJECTIVE AND OBJECTIVE BOX
Cheif complaints and Diagnosis: bronchiolitis/ asthma    Subjective: no complaints      REVIEW OF SYSTEMS:    CONSTITUTIONAL: No weakness, fevers or chills  EYES/ENT: No visual changes;  No vertigo or throat pain   NECK: No pain or stiffness  RESPIRATORY: No cough, wheezing, hemoptysis; No shortness of breath  CARDIOVASCULAR: No chest pain or palpitations  GASTROINTESTINAL: No abdominal or epigastric pain. No nausea, vomiting, or hematemesis; No diarrhea or constipation. No melena or hematochezia.  GENITOURINARY: No dysuria, frequency or hematuria  NEUROLOGICAL: No numbness or weakness  SKIN: No itching, burning, rashes, or lesions   All other review of systems is negative unless indicated above      Vital Signs Last 24 Hrs  T(C): 36.4 (13 May 2023 08:13), Max: 36.8 (12 May 2023 15:50)  T(F): 97.5 (13 May 2023 08:13), Max: 98.2 (12 May 2023 15:50)  HR: 89 (13 May 2023 08:13) (76 - 89)  BP: 117/64 (13 May 2023 08:13) (117/64 - 132/64)  BP(mean): --  RR: 16 (13 May 2023 08:13) (16 - 16)  SpO2: 98% (13 May 2023 08:13) (93% - 98%)    Parameters below as of 13 May 2023 08:13  Patient On (Oxygen Delivery Method): room air        HEENT:   pupils equal and reactive, EOMI, no oropharyngeal lesions, erythema, exudates, oral thrush    NECK:   supple, no carotid bruits, no palpable lymph nodes, no thyromegaly    CV:  +S1, +S2, regular, no murmurs or rubs    RESP:   lungs clear to auscultation bilaterally, no wheezing, rales, rhonchi, good air entry bilaterally    BREAST:  not examined    GI:  abdomen soft, non-tender, non-distended, normal BS, no bruits, no abdominal masses, no palpable masses    RECTAL:  not examined    :  not examined    MSK:   normal muscle tone, no atrophy, no rigidity, no contractions    EXT:   no clubbing, no cyanosis, no edema, no calf pain, swelling or erythema    VASCULAR:  pulses equal and symmetric in the upper and lower extremities    NEURO:  AAOX3, no focal neurological deficits, follows all commands, able to move extremities spontaneously    SKIN:  no ulcers, lesions or rashes    MEDICATIONS  (STANDING):  acetylcysteine 10%  Inhalation 4 milliLiter(s) Inhalation three times a day  albuterol/ipratropium for Nebulization 3 milliLiter(s) Nebulizer every 6 hours  apixaban 5 milliGRAM(s) Oral two times a day  buDESOnide    Inhalation Suspension 0.5 milliGRAM(s) Inhalation every 12 hours  dextrose 5%. 1000 milliLiter(s) (100 mL/Hr) IV Continuous <Continuous>  dextrose 5%. 1000 milliLiter(s) (50 mL/Hr) IV Continuous <Continuous>  dextrose 50% Injectable 25 Gram(s) IV Push once  dextrose 50% Injectable 12.5 Gram(s) IV Push once  dextrose 50% Injectable 25 Gram(s) IV Push once  fluconAZOLE   Tablet 100 milliGRAM(s) Oral daily  glucagon  Injectable 1 milliGRAM(s) IntraMuscular once  insulin glargine Injectable (LANTUS) 30 Unit(s) SubCutaneous at bedtime  insulin lispro (ADMELOG) corrective regimen sliding scale   SubCutaneous at bedtime  insulin lispro (ADMELOG) corrective regimen sliding scale   SubCutaneous three times a day before meals  magnesium oxide 400 milliGRAM(s) Oral two times a day with meals  methylPREDNISolone sodium succinate Injectable 20 milliGRAM(s) IV Push every 8 hours  mexiletine 200 milliGRAM(s) Oral every 8 hours  montelukast 10 milliGRAM(s) Oral at bedtime  nystatin    Suspension 326288 Unit(s) Oral every 6 hours  pantoprazole    Tablet 40 milliGRAM(s) Oral before breakfast  polyethylene glycol 3350 17 Gram(s) Oral daily    MEDICATIONS  (PRN):  acetaminophen     Tablet .. 650 milliGRAM(s) Oral every 6 hours PRN Temp greater or equal to 38C (100.4F), Mild Pain (1 - 3)  albuterol    90 MICROgram(s) HFA Inhaler 1 Puff(s) Inhalation four times a day PRN Shortness of Breath and/or Wheezing  albuterol    90 MICROgram(s) HFA Inhaler 2 Puff(s) Inhalation every 4 hours PRN Shortness of Breath  aluminum hydroxide/magnesium hydroxide/simethicone Suspension 30 milliLiter(s) Oral every 4 hours PRN Dyspepsia  dextrose Oral Gel 15 Gram(s) Oral once PRN Blood Glucose LESS THAN 70 milliGRAM(s)/deciliter  guaifenesin/dextromethorphan Oral Liquid 10 milliLiter(s) Oral every 4 hours PRN Cough  melatonin 3 milliGRAM(s) Oral at bedtime PRN Insomnia  ondansetron Injectable 4 milliGRAM(s) IV Push every 8 hours PRN Nausea and/or Vomiting  simethicone 80 milliGRAM(s) Chew three times a day PRN Gas      12 May 2023 07:40    139    |  104    |  19     ----------------------------<  232    4.8     |  30     |  0.76     Ca    8.4        12 May 2023 07:40    CBC Full  -  ( 12 May 2023 07:40 )  WBC Count : 23.68 K/uL  Hemoglobin : 11.1 g/dL  Hematocrit : 37.1 %  Platelet Count - Automated : 218 K/uL  Mean Cell Volume : 72.0 fl  Mean Cell Hemoglobin : 21.6 pg  Mean Cell Hemoglobin Concentration : 29.9 gm/dL  Auto Neutrophil # : x  Auto Lymphocyte # : x  Auto Monocyte # : x  Auto Eosinophil # : x  Auto Basophil # : x  Auto Neutrophil % : x  Auto Lymphocyte % : x  Auto Monocyte % : x  Auto Eosinophil % : x  Auto Basophil % : x                    Assessment and Plan:     72F w/PMH TIA, DVT, Colorectal CA s/p tx, Tracheobronchomalacia s/p Tracheobronchoplasty 2016 (no further evidence), adrenal insufficiency on steroids, COPD/Asthma, PAfib/AC, T2DM, recent admit 2 mos ago,  presents from NH for fever x2 days.  +worsening productive cough and sob.   In ED, MD d/w ID, rec to remove picc line (to be done by ED), given vanco iV, 1L ivf, duoneb, +febrile, CT chest: Increased infectious or inflammatory bronchitis/bronchiolitis since   4/5/2023.      #Sepsis as evidenced by hypotension, fever : No PNA  Parainfluenza Bronchiolitis  # Hx of Bronchiectasis  # Oral thrush  #Asthma exacerbation  - ICU eval for hypotension appreciated; BP responded to iv fluids  - duonebs, alb inh prn  - pulm cs  - ID cs  - d/judi  meropenem/vanco  - antitussives  -monitor labs, vitals   solumedrol 20 mg q 8 hrs  diflucan  smart vest for chest PTx  sputum gram stain +, but Cx grew normal val  monitor clinically     # C/o Abd Gas: simethicone added  -iv pepcid  -maalox    #T2DM  - resume home dose insulin  ISS  - monitor FS  changed diet to [facility] diet but refusing it and asking for regular diet.   Glucerna 1 can  tid    #Parox Afib, TIA  - c/w eliquis    # DVT PPX: on Eliquis      poc discussed with pt, team, Dr. Wilian MEHTA and advance care planning meeting done with the patient. She wishes to be fully resuscitated and mechanically ventilated if need arises. There are no limitations of any sort in her medical care and management. She is FULL CODE.

## 2023-05-13 NOTE — PROGRESS NOTE ADULT - SUBJECTIVE AND OBJECTIVE BOX
Subjective:    Awake, alert. No new pulmonary complaints.    5/13: awake, alert, no distress, is improving.  says is bringing up some greenish sputum.    MEDICATIONS  (STANDING):  acetylcysteine 10%  Inhalation 4 milliLiter(s) Inhalation three times a day  albuterol/ipratropium for Nebulization 3 milliLiter(s) Nebulizer every 6 hours  albuterol/ipratropium for Nebulization 3 milliLiter(s) Nebulizer every 6 hours  apixaban 5 milliGRAM(s) Oral two times a day  buDESOnide    Inhalation Suspension 0.5 milliGRAM(s) Inhalation every 12 hours  dextrose 5%. 1000 milliLiter(s) (100 mL/Hr) IV Continuous <Continuous>  dextrose 5%. 1000 milliLiter(s) (50 mL/Hr) IV Continuous <Continuous>  dextrose 50% Injectable 25 Gram(s) IV Push once  dextrose 50% Injectable 12.5 Gram(s) IV Push once  dextrose 50% Injectable 25 Gram(s) IV Push once  fluconAZOLE   Tablet 100 milliGRAM(s) Oral daily  glucagon  Injectable 1 milliGRAM(s) IntraMuscular once  insulin glargine Injectable (LANTUS) 30 Unit(s) SubCutaneous at bedtime  insulin lispro (ADMELOG) corrective regimen sliding scale   SubCutaneous three times a day before meals  insulin lispro (ADMELOG) corrective regimen sliding scale   SubCutaneous at bedtime  magnesium oxide 400 milliGRAM(s) Oral two times a day with meals  methylPREDNISolone sodium succinate Injectable 20 milliGRAM(s) IV Push every 8 hours  mexiletine 200 milliGRAM(s) Oral every 8 hours  montelukast 10 milliGRAM(s) Oral at bedtime  nystatin    Suspension 369039 Unit(s) Oral every 6 hours  pantoprazole    Tablet 40 milliGRAM(s) Oral before breakfast  polyethylene glycol 3350 17 Gram(s) Oral daily    MEDICATIONS  (PRN):  acetaminophen     Tablet .. 650 milliGRAM(s) Oral every 6 hours PRN Temp greater or equal to 38C (100.4F), Mild Pain (1 - 3)  albuterol    90 MICROgram(s) HFA Inhaler 1 Puff(s) Inhalation four times a day PRN Shortness of Breath and/or Wheezing  albuterol    90 MICROgram(s) HFA Inhaler 2 Puff(s) Inhalation every 4 hours PRN Shortness of Breath  aluminum hydroxide/magnesium hydroxide/simethicone Suspension 30 milliLiter(s) Oral every 4 hours PRN Dyspepsia  dextrose Oral Gel 15 Gram(s) Oral once PRN Blood Glucose LESS THAN 70 milliGRAM(s)/deciliter  guaifenesin/dextromethorphan Oral Liquid 10 milliLiter(s) Oral every 4 hours PRN Cough  melatonin 3 milliGRAM(s) Oral at bedtime PRN Insomnia  ondansetron Injectable 4 milliGRAM(s) IV Push every 8 hours PRN Nausea and/or Vomiting  simethicone 80 milliGRAM(s) Chew three times a day PRN Gas      Allergies    iodine (Short breath; Swelling)  Avelox (Short breath; Pruritus)  shellfish (Anaphylaxis)  aspirin (Short breath)  Dilaudid (Short breath)  codeine (Short breath)  cefepime (Anaphylaxis)  penicillin (Anaphylaxis)  tetanus toxoid (Short breath)  Valium (Short breath)    Intolerances        Vital Signs Last 24 Hrs  T(C): 36.4 (12 May 2023 08:11), Max: 36.9 (11 May 2023 16:41)  T(F): 97.5 (12 May 2023 08:11), Max: 98.4 (11 May 2023 16:41)  HR: 77 (12 May 2023 08:11) (77 - 96)  BP: 114/63 (12 May 2023 08:11) (114/63 - 123/68)  BP(mean): --  RR: 18 (12 May 2023 08:11) (18 - 18)  SpO2: 97% (12 May 2023 08:11) (95% - 98%)    Parameters below as of 12 May 2023 08:11  Patient On (Oxygen Delivery Method): room air        PHYSICAL EXAMINATION:    NECK:  Supple. No lymphadenopathy. Jugular venous pressure not elevated.  HEART:   The cardiac impulse has a normal quality. Reg., Nl S1 and S2.    CHEST:  Chest with low pithced wheezes and rhnochi. Normal respiratory effort.  ABDOMEN:  Soft and nontender.   EXTREMITIES:  There is no edema.       LABS:                        11.1   23.68 )-----------( 218      ( 12 May 2023 07:40 )             37.1     05-12    139  |  104  |  19  ----------------------------<  232<H>  4.8   |  30  |  0.76    Ca    8.4<L>      12 May 2023 07:40            RADIOLOGY & ADDITIONAL TESTS:    Assessment/Plan    - Maintain solumedrol at 20MG Q8H today-taper as tolerated over next few days  - Aerosols with Duoneb/Budesonide  - Mucomyst 10% TID  - Chest PT/Smart Vest  - Singulair  - Monitor O2 Sats  - OOB to chair  - will obtain sputum culture    Problem/Recommendation - 1:  ·  Acute respiratory failure with hypoxia.   Problem/Recommendation - 2:  ·  Severe asthma.   Problem/Recommendation - 3:  ·  Dyspnea.   Problem/Recommendation - 4:  ·  Bronchiectasis.   Problem/Recommendation - 5:  ·  Parainfluenza infection.   Problem/Recommendation - 6:  ·  Tracheobronchomalacia.

## 2023-05-14 LAB
GLUCOSE BLDC GLUCOMTR-MCNC: 151 MG/DL — HIGH (ref 70–99)
GLUCOSE BLDC GLUCOMTR-MCNC: 283 MG/DL — HIGH (ref 70–99)
GLUCOSE BLDC GLUCOMTR-MCNC: 306 MG/DL — HIGH (ref 70–99)
GLUCOSE BLDC GLUCOMTR-MCNC: 435 MG/DL — HIGH (ref 70–99)
GRAM STN FLD: SIGNIFICANT CHANGE UP
SPECIMEN SOURCE: SIGNIFICANT CHANGE UP

## 2023-05-14 PROCEDURE — 99233 SBSQ HOSP IP/OBS HIGH 50: CPT

## 2023-05-14 PROCEDURE — 99239 HOSP IP/OBS DSCHRG MGMT >30: CPT

## 2023-05-14 RX ORDER — INSULIN LISPRO 100/ML
8 VIAL (ML) SUBCUTANEOUS ONCE
Refills: 0 | Status: COMPLETED | OUTPATIENT
Start: 2023-05-14 | End: 2023-05-14

## 2023-05-14 RX ADMIN — POLYETHYLENE GLYCOL 3350 17 GRAM(S): 17 POWDER, FOR SOLUTION ORAL at 09:32

## 2023-05-14 RX ADMIN — Medication 3 MILLILITER(S): at 09:13

## 2023-05-14 RX ADMIN — FAMOTIDINE 20 MILLIGRAM(S): 10 INJECTION INTRAVENOUS at 09:32

## 2023-05-14 RX ADMIN — Medication 0.5 MILLIGRAM(S): at 09:13

## 2023-05-14 RX ADMIN — Medication 12: at 17:11

## 2023-05-14 RX ADMIN — Medication 4 MILLILITER(S): at 21:13

## 2023-05-14 RX ADMIN — Medication 500000 UNIT(S): at 13:09

## 2023-05-14 RX ADMIN — Medication 0.5 MILLIGRAM(S): at 21:13

## 2023-05-14 RX ADMIN — MEXILETINE HYDROCHLORIDE 200 MILLIGRAM(S): 150 CAPSULE ORAL at 21:02

## 2023-05-14 RX ADMIN — Medication 2: at 09:36

## 2023-05-14 RX ADMIN — MEXILETINE HYDROCHLORIDE 200 MILLIGRAM(S): 150 CAPSULE ORAL at 13:10

## 2023-05-14 RX ADMIN — Medication 3 MILLILITER(S): at 15:24

## 2023-05-14 RX ADMIN — Medication 500000 UNIT(S): at 06:09

## 2023-05-14 RX ADMIN — Medication 20 MILLIGRAM(S): at 21:02

## 2023-05-14 RX ADMIN — Medication 10 MILLILITER(S): at 21:03

## 2023-05-14 RX ADMIN — MAGNESIUM OXIDE 400 MG ORAL TABLET 400 MILLIGRAM(S): 241.3 TABLET ORAL at 17:14

## 2023-05-14 RX ADMIN — Medication 20 MILLIGRAM(S): at 06:10

## 2023-05-14 RX ADMIN — Medication 8 UNIT(S): at 17:18

## 2023-05-14 RX ADMIN — PANTOPRAZOLE SODIUM 40 MILLIGRAM(S): 20 TABLET, DELAYED RELEASE ORAL at 06:09

## 2023-05-14 RX ADMIN — Medication 3 MILLILITER(S): at 21:13

## 2023-05-14 RX ADMIN — Medication 10 MILLILITER(S): at 13:10

## 2023-05-14 RX ADMIN — APIXABAN 5 MILLIGRAM(S): 2.5 TABLET, FILM COATED ORAL at 09:31

## 2023-05-14 RX ADMIN — Medication 4 MILLILITER(S): at 09:14

## 2023-05-14 RX ADMIN — Medication 3 MILLIGRAM(S): at 01:40

## 2023-05-14 RX ADMIN — Medication 30 MILLILITER(S): at 23:31

## 2023-05-14 RX ADMIN — INSULIN GLARGINE 30 UNIT(S): 100 INJECTION, SOLUTION SUBCUTANEOUS at 21:01

## 2023-05-14 RX ADMIN — Medication 500000 UNIT(S): at 23:31

## 2023-05-14 RX ADMIN — FLUCONAZOLE 100 MILLIGRAM(S): 150 TABLET ORAL at 09:31

## 2023-05-14 RX ADMIN — MONTELUKAST 10 MILLIGRAM(S): 4 TABLET, CHEWABLE ORAL at 21:03

## 2023-05-14 RX ADMIN — Medication 500000 UNIT(S): at 17:14

## 2023-05-14 RX ADMIN — MAGNESIUM OXIDE 400 MG ORAL TABLET 400 MILLIGRAM(S): 241.3 TABLET ORAL at 09:31

## 2023-05-14 RX ADMIN — MEXILETINE HYDROCHLORIDE 200 MILLIGRAM(S): 150 CAPSULE ORAL at 06:09

## 2023-05-14 RX ADMIN — APIXABAN 5 MILLIGRAM(S): 2.5 TABLET, FILM COATED ORAL at 21:02

## 2023-05-14 RX ADMIN — Medication 4 MILLILITER(S): at 15:24

## 2023-05-14 RX ADMIN — Medication 2: at 21:01

## 2023-05-14 NOTE — PROGRESS NOTE ADULT - SUBJECTIVE AND OBJECTIVE BOX
Subjective:    Awake, alert. No new pulmonary complaints.    5/13: awake, alert, no distress, is improving.  says is bringing up some greenish sputum.  5/13: awake, alert, has cough, some greenish sputum.     MEDICATIONS  (STANDING):  acetylcysteine 10%  Inhalation 4 milliLiter(s) Inhalation three times a day  albuterol/ipratropium for Nebulization 3 milliLiter(s) Nebulizer every 6 hours  albuterol/ipratropium for Nebulization 3 milliLiter(s) Nebulizer every 6 hours  apixaban 5 milliGRAM(s) Oral two times a day  buDESOnide    Inhalation Suspension 0.5 milliGRAM(s) Inhalation every 12 hours  dextrose 5%. 1000 milliLiter(s) (100 mL/Hr) IV Continuous <Continuous>  dextrose 5%. 1000 milliLiter(s) (50 mL/Hr) IV Continuous <Continuous>  dextrose 50% Injectable 25 Gram(s) IV Push once  dextrose 50% Injectable 12.5 Gram(s) IV Push once  dextrose 50% Injectable 25 Gram(s) IV Push once  fluconAZOLE   Tablet 100 milliGRAM(s) Oral daily  glucagon  Injectable 1 milliGRAM(s) IntraMuscular once  insulin glargine Injectable (LANTUS) 30 Unit(s) SubCutaneous at bedtime  insulin lispro (ADMELOG) corrective regimen sliding scale   SubCutaneous three times a day before meals  insulin lispro (ADMELOG) corrective regimen sliding scale   SubCutaneous at bedtime  magnesium oxide 400 milliGRAM(s) Oral two times a day with meals  methylPREDNISolone sodium succinate Injectable 20 milliGRAM(s) IV Push every 8 hours  mexiletine 200 milliGRAM(s) Oral every 8 hours  montelukast 10 milliGRAM(s) Oral at bedtime  nystatin    Suspension 955326 Unit(s) Oral every 6 hours  pantoprazole    Tablet 40 milliGRAM(s) Oral before breakfast  polyethylene glycol 3350 17 Gram(s) Oral daily    MEDICATIONS  (PRN):  acetaminophen     Tablet .. 650 milliGRAM(s) Oral every 6 hours PRN Temp greater or equal to 38C (100.4F), Mild Pain (1 - 3)  albuterol    90 MICROgram(s) HFA Inhaler 1 Puff(s) Inhalation four times a day PRN Shortness of Breath and/or Wheezing  albuterol    90 MICROgram(s) HFA Inhaler 2 Puff(s) Inhalation every 4 hours PRN Shortness of Breath  aluminum hydroxide/magnesium hydroxide/simethicone Suspension 30 milliLiter(s) Oral every 4 hours PRN Dyspepsia  dextrose Oral Gel 15 Gram(s) Oral once PRN Blood Glucose LESS THAN 70 milliGRAM(s)/deciliter  guaifenesin/dextromethorphan Oral Liquid 10 milliLiter(s) Oral every 4 hours PRN Cough  melatonin 3 milliGRAM(s) Oral at bedtime PRN Insomnia  ondansetron Injectable 4 milliGRAM(s) IV Push every 8 hours PRN Nausea and/or Vomiting  simethicone 80 milliGRAM(s) Chew three times a day PRN Gas      Allergies    iodine (Short breath; Swelling)  Avelox (Short breath; Pruritus)  shellfish (Anaphylaxis)  aspirin (Short breath)  Dilaudid (Short breath)  codeine (Short breath)  cefepime (Anaphylaxis)  penicillin (Anaphylaxis)  tetanus toxoid (Short breath)  Valium (Short breath)    Intolerances        Vital Signs Last 24 Hrs  T(C): 36.4 (12 May 2023 08:11), Max: 36.9 (11 May 2023 16:41)  T(F): 97.5 (12 May 2023 08:11), Max: 98.4 (11 May 2023 16:41)  HR: 77 (12 May 2023 08:11) (77 - 96)  BP: 114/63 (12 May 2023 08:11) (114/63 - 123/68)  BP(mean): --  RR: 18 (12 May 2023 08:11) (18 - 18)  SpO2: 97% (12 May 2023 08:11) (95% - 98%)    Parameters below as of 12 May 2023 08:11  Patient On (Oxygen Delivery Method): room air        PHYSICAL EXAMINATION:    NECK:  Supple. No lymphadenopathy. Jugular venous pressure not elevated.  HEART:   The cardiac impulse has a normal quality. Reg., Nl S1 and S2.    CHEST:  Chest with few rhnochi. Normal respiratory effort.  ABDOMEN:  Soft and nontender.   EXTREMITIES:  There is no edema.       LABS:                        11.1   23.68 )-----------( 218      ( 12 May 2023 07:40 )             37.1     05-12    139  |  104  |  19  ----------------------------<  232<H>  4.8   |  30  |  0.76    Ca    8.4<L>      12 May 2023 07:40            RADIOLOGY & ADDITIONAL TESTS:    Assessment/Plan    - Solumedrol at 20MG Q8H -taper as tolerated over next few days-  now on prednisone 20 mg 2 times per day.   - Aerosols with Duoneb/Budesonide  - Mucomyst 10% TID  - Chest PT/Smart Vest  - Singulair  - Monitor O2 Sats  - OOB to chair  - will obtain sputum culture    Problem/Recommendation - 1:  ·  Acute respiratory failure with hypoxia.   Problem/Recommendation - 2:  ·  Severe asthma.   Problem/Recommendation - 3:  ·  Dyspnea.   Problem/Recommendation - 4:  ·  Bronchiectasis.   Problem/Recommendation - 5:  ·  Parainfluenza infection.   Problem/Recommendation - 6:  ·  Tracheobronchomalacia.

## 2023-05-14 NOTE — PROGRESS NOTE ADULT - SUBJECTIVE AND OBJECTIVE BOX
Cheif complaints and Diagnosis: bronchiolitis/acute asthma exacerbation    Subjective: no complaints      REVIEW OF SYSTEMS:    CONSTITUTIONAL: No weakness, fevers or chills  EYES/ENT: No visual changes;  No vertigo or throat pain   NECK: No pain or stiffness  RESPIRATORY: No cough, wheezing, hemoptysis; No shortness of breath  CARDIOVASCULAR: No chest pain or palpitations  GASTROINTESTINAL: No abdominal or epigastric pain. No nausea, vomiting, or hematemesis; No diarrhea or constipation. No melena or hematochezia.  GENITOURINARY: No dysuria, frequency or hematuria  NEUROLOGICAL: No numbness or weakness  SKIN: No itching, burning, rashes, or lesions   All other review of systems is negative unless indicated above      Vital Signs Last 24 Hrs  T(C): 36.3 (13 May 2023 23:44), Max: 36.6 (13 May 2023 17:00)  T(F): 97.3 (13 May 2023 23:44), Max: 97.9 (13 May 2023 17:00)  HR: 73 (13 May 2023 23:44) (70 - 89)  BP: 156/87 (13 May 2023 23:44) (125/58 - 156/87)  BP(mean): --  RR: 18 (13 May 2023 23:44) (16 - 18)  SpO2: 97% (13 May 2023 23:44) (95% - 98%)    Parameters below as of 13 May 2023 23:44  Patient On (Oxygen Delivery Method): room air        HEENT:   pupils equal and reactive, EOMI, no oropharyngeal lesions, erythema, exudates, oral thrush    NECK:   supple, no carotid bruits, no palpable lymph nodes, no thyromegaly    CV:  +S1, +S2, regular, no murmurs or rubs    RESP:   lungs clear to auscultation bilaterally, no wheezing, rales, rhonchi, good air entry bilaterally    BREAST:  not examined    GI:  abdomen soft, non-tender, non-distended, normal BS, no bruits, no abdominal masses, no palpable masses    RECTAL:  not examined    :  not examined    MSK:   normal muscle tone, no atrophy, no rigidity, no contractions    EXT:   no clubbing, no cyanosis, no edema, no calf pain, swelling or erythema    VASCULAR:  pulses equal and symmetric in the upper and lower extremities    NEURO:  AAOX3, no focal neurological deficits, follows all commands, able to move extremities spontaneously    SKIN:  no ulcers, lesions or rashes    MEDICATIONS  (STANDING):  acetylcysteine 10%  Inhalation 4 milliLiter(s) Inhalation three times a day  albuterol/ipratropium for Nebulization 3 milliLiter(s) Nebulizer every 6 hours  apixaban 5 milliGRAM(s) Oral two times a day  buDESOnide    Inhalation Suspension 0.5 milliGRAM(s) Inhalation every 12 hours  dextrose 5%. 1000 milliLiter(s) (100 mL/Hr) IV Continuous <Continuous>  dextrose 5%. 1000 milliLiter(s) (50 mL/Hr) IV Continuous <Continuous>  dextrose 50% Injectable 25 Gram(s) IV Push once  dextrose 50% Injectable 12.5 Gram(s) IV Push once  dextrose 50% Injectable 25 Gram(s) IV Push once  famotidine    Tablet 20 milliGRAM(s) Oral daily  fluconAZOLE   Tablet 100 milliGRAM(s) Oral daily  glucagon  Injectable 1 milliGRAM(s) IntraMuscular once  insulin glargine Injectable (LANTUS) 30 Unit(s) SubCutaneous at bedtime  insulin lispro (ADMELOG) corrective regimen sliding scale   SubCutaneous at bedtime  insulin lispro (ADMELOG) corrective regimen sliding scale   SubCutaneous three times a day before meals  magnesium oxide 400 milliGRAM(s) Oral two times a day with meals  methylPREDNISolone sodium succinate Injectable 20 milliGRAM(s) IV Push every 8 hours  mexiletine 200 milliGRAM(s) Oral every 8 hours  montelukast 10 milliGRAM(s) Oral at bedtime  nystatin    Suspension 623776 Unit(s) Oral every 6 hours  pantoprazole    Tablet 40 milliGRAM(s) Oral before breakfast  polyethylene glycol 3350 17 Gram(s) Oral daily    MEDICATIONS  (PRN):  acetaminophen     Tablet .. 650 milliGRAM(s) Oral every 6 hours PRN Temp greater or equal to 38C (100.4F), Mild Pain (1 - 3)  albuterol    90 MICROgram(s) HFA Inhaler 2 Puff(s) Inhalation every 4 hours PRN Shortness of Breath  albuterol    90 MICROgram(s) HFA Inhaler 1 Puff(s) Inhalation four times a day PRN Shortness of Breath and/or Wheezing  aluminum hydroxide/magnesium hydroxide/simethicone Suspension 30 milliLiter(s) Oral every 4 hours PRN Dyspepsia  dextrose Oral Gel 15 Gram(s) Oral once PRN Blood Glucose LESS THAN 70 milliGRAM(s)/deciliter  guaifenesin/dextromethorphan Oral Liquid 10 milliLiter(s) Oral every 4 hours PRN Cough  melatonin 3 milliGRAM(s) Oral at bedtime PRN Insomnia  ondansetron Injectable 4 milliGRAM(s) IV Push every 8 hours PRN Nausea and/or Vomiting  simethicone 80 milliGRAM(s) Chew three times a day PRN Gas                Assessment and Plan:     72F w/PMH TIA, DVT, Colorectal CA s/p tx, Tracheobronchomalacia s/p Tracheobronchoplasty 2016 (no further evidence), adrenal insufficiency on steroids, COPD/Asthma, PAfib/AC, T2DM, recent admit 2 mos ago,  presents from NH for fever x2 days.  +worsening productive cough and sob.   In ED, MD d/w ID, rec to remove picc line (to be done by ED), given vanco iV, 1L ivf, duoneb, +febrile, CT chest: Increased infectious or inflammatory bronchitis/bronchiolitis since   4/5/2023.      #Sepsis as evidenced by hypotension, fever : No PNA  Parainfluenza Bronchiolitis  # Hx of Bronchiectasis  # Oral thrush  #Asthma exacerbation  - ICU eval for hypotension appreciated; BP responded to iv fluids  - duonebs, alb inh prn  - pulm cs  - ID cs  - d/judi  meropenem/vanco  - antitussives  -monitor labs, vitals   solumedrol 20 mg q 8 hrs>>>> taper to oral prednisone 20mg BID, if tolerates MARY aguayo   diflucan  smart vest for chest PTx  sputum gram stain +, but Cx grew normal val  monitor clinically     # C/o Abd Gas, belching  -PO pepcid  -maalox  -lots of air in the colostomy bag  -patient requesting GI consult. f/u reccomendations    #T2DM  - resume home dose insulin  ISS  - monitor FS  changed diet to [facility] diet but refusing it and asking for regular diet.   Glucerna 1 can  tid    #Parox Afib, TIA  - c/w eliquis    # DVT PPX: on Eliquis      poc discussed with pt, team, Dr. Wilian MEHTA and advance care planning meeting done with the patient. She wishes to be fully resuscitated and mechanically ventilated if need arises. There are no limitations of any sort in her medical care and management. She is FULL CODE.

## 2023-05-15 ENCOUNTER — APPOINTMENT (OUTPATIENT)
Dept: PULMONOLOGY | Facility: CLINIC | Age: 73
End: 2023-05-15

## 2023-05-15 LAB
-  AMIKACIN: SIGNIFICANT CHANGE UP
-  AMOXICILLIN/CLAVULANIC ACID: SIGNIFICANT CHANGE UP
-  AMPICILLIN/SULBACTAM: SIGNIFICANT CHANGE UP
-  AMPICILLIN: SIGNIFICANT CHANGE UP
-  AZTREONAM: SIGNIFICANT CHANGE UP
-  CEFAZOLIN: SIGNIFICANT CHANGE UP
-  CEFEPIME: SIGNIFICANT CHANGE UP
-  CEFTRIAXONE: SIGNIFICANT CHANGE UP
-  CIPROFLOXACIN: SIGNIFICANT CHANGE UP
-  ERTAPENEM: SIGNIFICANT CHANGE UP
-  GENTAMICIN: SIGNIFICANT CHANGE UP
-  LEVOFLOXACIN: SIGNIFICANT CHANGE UP
-  MEROPENEM: SIGNIFICANT CHANGE UP
-  PIPERACILLIN/TAZOBACTAM: SIGNIFICANT CHANGE UP
-  TOBRAMYCIN: SIGNIFICANT CHANGE UP
-  TRIMETHOPRIM/SULFAMETHOXAZOLE: SIGNIFICANT CHANGE UP
CULTURE RESULTS: SIGNIFICANT CHANGE UP
GLUCOSE BLDC GLUCOMTR-MCNC: 163 MG/DL — HIGH (ref 70–99)
GLUCOSE BLDC GLUCOMTR-MCNC: 333 MG/DL — HIGH (ref 70–99)
GLUCOSE BLDC GLUCOMTR-MCNC: 375 MG/DL — HIGH (ref 70–99)
GLUCOSE BLDC GLUCOMTR-MCNC: 72 MG/DL — SIGNIFICANT CHANGE UP (ref 70–99)
METHOD TYPE: SIGNIFICANT CHANGE UP
ORGANISM # SPEC MICROSCOPIC CNT: SIGNIFICANT CHANGE UP
ORGANISM # SPEC MICROSCOPIC CNT: SIGNIFICANT CHANGE UP
SPECIMEN SOURCE: SIGNIFICANT CHANGE UP

## 2023-05-15 PROCEDURE — 99233 SBSQ HOSP IP/OBS HIGH 50: CPT

## 2023-05-15 PROCEDURE — 99232 SBSQ HOSP IP/OBS MODERATE 35: CPT

## 2023-05-15 RX ADMIN — Medication 500000 UNIT(S): at 06:03

## 2023-05-15 RX ADMIN — Medication 4 MILLILITER(S): at 20:22

## 2023-05-15 RX ADMIN — INSULIN GLARGINE 30 UNIT(S): 100 INJECTION, SOLUTION SUBCUTANEOUS at 20:20

## 2023-05-15 RX ADMIN — MEXILETINE HYDROCHLORIDE 200 MILLIGRAM(S): 150 CAPSULE ORAL at 13:02

## 2023-05-15 RX ADMIN — MAGNESIUM OXIDE 400 MG ORAL TABLET 400 MILLIGRAM(S): 241.3 TABLET ORAL at 10:19

## 2023-05-15 RX ADMIN — MONTELUKAST 10 MILLIGRAM(S): 4 TABLET, CHEWABLE ORAL at 20:16

## 2023-05-15 RX ADMIN — Medication 4 MILLILITER(S): at 14:55

## 2023-05-15 RX ADMIN — Medication 4: at 20:21

## 2023-05-15 RX ADMIN — Medication 3 MILLILITER(S): at 20:22

## 2023-05-15 RX ADMIN — MAGNESIUM OXIDE 400 MG ORAL TABLET 400 MILLIGRAM(S): 241.3 TABLET ORAL at 17:19

## 2023-05-15 RX ADMIN — Medication 0.5 MILLIGRAM(S): at 20:22

## 2023-05-15 RX ADMIN — Medication 2: at 11:32

## 2023-05-15 RX ADMIN — Medication 4 MILLILITER(S): at 09:26

## 2023-05-15 RX ADMIN — Medication 30 MILLILITER(S): at 20:17

## 2023-05-15 RX ADMIN — Medication 10: at 17:18

## 2023-05-15 RX ADMIN — APIXABAN 5 MILLIGRAM(S): 2.5 TABLET, FILM COATED ORAL at 20:16

## 2023-05-15 RX ADMIN — Medication 500000 UNIT(S): at 23:01

## 2023-05-15 RX ADMIN — Medication 20 MILLIGRAM(S): at 20:16

## 2023-05-15 RX ADMIN — Medication 20 MILLIGRAM(S): at 10:19

## 2023-05-15 RX ADMIN — FLUCONAZOLE 100 MILLIGRAM(S): 150 TABLET ORAL at 10:20

## 2023-05-15 RX ADMIN — PANTOPRAZOLE SODIUM 40 MILLIGRAM(S): 20 TABLET, DELAYED RELEASE ORAL at 06:04

## 2023-05-15 RX ADMIN — MEXILETINE HYDROCHLORIDE 200 MILLIGRAM(S): 150 CAPSULE ORAL at 06:03

## 2023-05-15 RX ADMIN — Medication 3 MILLILITER(S): at 09:24

## 2023-05-15 RX ADMIN — APIXABAN 5 MILLIGRAM(S): 2.5 TABLET, FILM COATED ORAL at 10:19

## 2023-05-15 RX ADMIN — Medication 500000 UNIT(S): at 12:03

## 2023-05-15 RX ADMIN — Medication 0.5 MILLIGRAM(S): at 09:24

## 2023-05-15 RX ADMIN — SIMETHICONE 80 MILLIGRAM(S): 80 TABLET, CHEWABLE ORAL at 10:25

## 2023-05-15 RX ADMIN — FAMOTIDINE 20 MILLIGRAM(S): 10 INJECTION INTRAVENOUS at 10:19

## 2023-05-15 RX ADMIN — SIMETHICONE 80 MILLIGRAM(S): 80 TABLET, CHEWABLE ORAL at 20:16

## 2023-05-15 RX ADMIN — MEXILETINE HYDROCHLORIDE 200 MILLIGRAM(S): 150 CAPSULE ORAL at 20:16

## 2023-05-15 RX ADMIN — Medication 3 MILLILITER(S): at 14:56

## 2023-05-15 NOTE — PROGRESS NOTE ADULT - ASSESSMENT
Assessment/Plan    - s/p Solumedrol at 20MG Q8H -taper as tolerated over next few days-  now on prednisone 20 mg 2 times per day.   - Aerosols with Duoneb/Budesonide  - Mucomyst 10% TID  - Chest PT/Smart Vest  - Singulair  - Monitor O2 Sats  - OOB to chair  - Sputum Cx: moderate  P. mirabilis, will ask ID to evaluate    Problem/Recommendation - 1:  ·  Acute respiratory failure with hypoxia.   Problem/Recommendation - 2:  ·  Severe asthma.   Problem/Recommendation - 3:  ·  Dyspnea.   Problem/Recommendation - 4:  ·  Bronchiectasis.   Problem/Recommendation - 5:  ·  Parainfluenza infection.   Problem/Recommendation - 6:  ·  Tracheobronchomalacia.

## 2023-05-15 NOTE — PROGRESS NOTE ADULT - SUBJECTIVE AND OBJECTIVE BOX
Date of service: 05-15-23 @ 12:34    Asked to reevaluate for new sputum culture   Reported with new proteus spp in sputum  Has cough  No fever    ROS: no fever or chills; denies dizziness, no HA, no SOB, no abdominal pain, no diarrhea or constipation; no dysuria, no legs pain, no rashes    MEDICATIONS  (STANDING):  acetylcysteine 10%  Inhalation 4 milliLiter(s) Inhalation three times a day  albuterol/ipratropium for Nebulization 3 milliLiter(s) Nebulizer every 6 hours  apixaban 5 milliGRAM(s) Oral two times a day  buDESOnide    Inhalation Suspension 0.5 milliGRAM(s) Inhalation every 12 hours  dextrose 5%. 1000 milliLiter(s) (100 mL/Hr) IV Continuous <Continuous>  dextrose 5%. 1000 milliLiter(s) (50 mL/Hr) IV Continuous <Continuous>  dextrose 50% Injectable 25 Gram(s) IV Push once  dextrose 50% Injectable 12.5 Gram(s) IV Push once  dextrose 50% Injectable 25 Gram(s) IV Push once  famotidine    Tablet 20 milliGRAM(s) Oral daily  fluconAZOLE   Tablet 100 milliGRAM(s) Oral daily  glucagon  Injectable 1 milliGRAM(s) IntraMuscular once  insulin glargine Injectable (LANTUS) 30 Unit(s) SubCutaneous at bedtime  insulin lispro (ADMELOG) corrective regimen sliding scale   SubCutaneous three times a day before meals  insulin lispro (ADMELOG) corrective regimen sliding scale   SubCutaneous at bedtime  magnesium oxide 400 milliGRAM(s) Oral two times a day with meals  mexiletine 200 milliGRAM(s) Oral every 8 hours  montelukast 10 milliGRAM(s) Oral at bedtime  nystatin    Suspension 304424 Unit(s) Oral every 6 hours  pantoprazole    Tablet 40 milliGRAM(s) Oral before breakfast  polyethylene glycol 3350 17 Gram(s) Oral daily  predniSONE   Tablet 20 milliGRAM(s) Oral two times a day    Vital Signs Last 24 Hrs  T(C): 36.6 (15 May 2023 08:26), Max: 36.8 (14 May 2023 23:25)  T(F): 97.8 (15 May 2023 08:26), Max: 98.2 (14 May 2023 23:25)  HR: 66 (15 May 2023 09:24) (66 - 88)  BP: 128/78 (15 May 2023 08:26) (125/71 - 136/93)  BP(mean): --  RR: 18 (15 May 2023 08:26) (18 - 18)  SpO2: 97% (15 May 2023 09:24) (97% - 100%)    Parameters below as of 15 May 2023 09:24  Patient On (Oxygen Delivery Method): room air     Physical exam:    Constitutional:  No acute distress  HEENT: NC/AT, EOMI, PERRLA, conjunctivae clear; ears and nose atraumatic  Neck: supple; thyroid not palpable  Back: no tenderness  Respiratory: respiratory effort normal; few crackles at bases  Cardiovascular: S1S2 regular, no murmurs  Abdomen: soft, not tender, not distended, positive BS  Genitourinary: no suprapubic tenderness  Lymphatic: no LN palpable  Musculoskeletal: no muscle tenderness, no joint swelling or tenderness  Extremities: no pedal edema  Neurological/ Psychiatric: AxOx3, judgement and insight normal; moving all extremities  Skin: no rashes; no palpable lesions    Labs: reviewed                        11.8   8.07  )-----------( 181      ( 05 May 2023 06:21 )             42.0     05-    137  |  107  |  12  ----------------------------<  248<H>  4.9   |  23  |  0.56    Ca    8.7      05 May 2023 06:21    TPro  7.6  /  Alb  3.2<L>  /  TBili  0.3  /  DBili  x   /  AST  27  /  ALT  21  /  AlkPhos  104  05-04     LIVER FUNCTIONS - ( 04 May 2023 09:51 )  Alb: 3.2 g/dL / Pro: 7.6 gm/dL / ALK PHOS: 104 U/L / ALT: 21 U/L / AST: 27 U/L / GGT: x           Urinalysis Basic - ( 04 May 2023 13:31 )    Color: Yellow / Appearance: Clear / S.015 / pH: x  Gluc: x / Ketone: Small  / Bili: Negative / Urobili: Negative   Blood: x / Protein: Negative / Nitrite: Negative   Leuk Esterase: Negative / RBC: 6-10 /HPF / WBC 3-5 /HPF   Sq Epi: x / Non Sq Epi: x / Bacteria: Occasional    Parainfluenza 3 ( @ 09:51)  Detected      Culture - Sputum (collected 13 May 2023 15:02)  Source: .Sputum Sputum  Gram Stain (14 May 2023 00:02):    Moderate polymorphonuclear leukocytes per low power field    No Squamous epithelial cells per low power field    Moderate Gram Negative Rods per oil power field  Preliminary Report (14 May 2023 22:15):    Moderate Proteus mirabilis    Normal Respiratory Jessica present    Culture - Sputum (collected 05 May 2023 21:53)  Source: .Sputum Sputum  Gram Stain (06 May 2023 07:44):    Few polymorphonuclear leukocytes per low power field    Few Squamous epithelial cells per low power field    Moderate Gram Positive Rods seen per oil power field  Final Report (07 May 2023 18:24):    Normal Respiratory Jessica present    Radiology: all available radiological tests reviewed    < from: CT Chest No Cont (23 @ 13:36) >  Increased infectious or inflammatory bronchitis/bronchiolitis since 2023.  < end of copied text >        Advanced directives addressed: full resuscitation

## 2023-05-15 NOTE — PROGRESS NOTE ADULT - ASSESSMENT
71 y/o Female with h/o TIA, DVT, Colorectal CA s/p tx, Tracheobronchomalacia s/p tracheobronchoplasty 2016, adrenal insufficiency on steroids, COPD/Asthma, Afib/AC, T2DM was admitted on 5/4 from NH for fever x 2 days. At the NH she had PICC line placed and started on meropenem empirically 2 days PTA, but continues to have fever and worsening productive cough and SOB. She endorses dry heaves and vomiting  last night, denies any diarrhea/abd pain. In ER, pt found to be hypotensive and received vancomycin IV.     1. Acute bronchiolitis with parainfluenza type 3. Allergy to PCN with anaphylaxis.   -sputum c/s noted  -new PRMI reported on recent sputum culture ?pathogen vs colonizant  -respiratory she remains frail  -CT chest on 5/4 - no infiltrates noted  -would repeat CXR  -observe off abx for now, will reconsider abx therapy once s/s is available  -abx choice will be difficult in donnie of her PCN allergy with anaphylaxis  -respiratory care  -pulmonary evaluation appreciated  -monitor temps  -f/u CBC  -supportive care  2. Other issues:   -care per medicine    d/w Dr. Whitfield and pulmonary NP

## 2023-05-15 NOTE — PROGRESS NOTE ADULT - SUBJECTIVE AND OBJECTIVE BOX
HOSPITALIST PROGRESS NOTE:  SUBJECTIVE:  PCP:  Chief Complaint: Patient is a 72y old  Female who presents with a chief complaint of fever (15 May 2023 10:15)      HPI:  HPI:  72F w/PMH TIA, DVT, Colorectal CA s/p tx, Tracheobronchomalacia s/p Tracheobronchoplasty 2016 (no further evidence), adrenal insufficiency on steroids, COPD/Asthma, Afib/AC, T2DM, recent admit 2 mos ago,  presents from NH for fever x2 days.  She had Picc line placed and started on meropenem empirically 2 days ago, but continues to have fever and worsening productive cough and sob. Pt is AAOx3 and gives her own hx.  She endorses dry heaves and vomiting  last night, denies any diarrhea/abd pain.  At time of my exam, pt found to be hypotensive on repeat bp checks.      In ED, MD d/w ID, rec to remove picc line (to be done by ED), given vanco iV, 1L ivf, duoneb, +febrile, CT chest: Increased infectious or inflammatory bronchitis/bronchiolitis since   4/5/2023.    5/15: Patient has no complaints. NO dyspnea; Sputum cutlures came back proteus miraboilis. patient continues to have belching; improved with mylicon; No abd pain, CP    Allergies:  iodine (Short breath; Swelling)  Avelox (Short breath; Pruritus)  shellfish (Anaphylaxis)  aspirin (Short breath)  Dilaudid (Short breath)  codeine (Short breath)  cefepime (Anaphylaxis)  penicillin (Anaphylaxis)  tetanus toxoid (Short breath)  Valium (Short breath)    REVIEW OF SYSTEMS:  See HPI. All other review of systems is negative unless indicated above.     OBJECTIVE  Physical Exam:  Vital Signs:    Vital Signs Last 24 Hrs  T(C): 36.6 (15 May 2023 08:26), Max: 36.8 (14 May 2023 23:25)  T(F): 97.8 (15 May 2023 08:26), Max: 98.2 (14 May 2023 23:25)  HR: 66 (15 May 2023 09:24) (66 - 88)  BP: 128/78 (15 May 2023 08:26) (125/71 - 136/93)  BP(mean): --  RR: 18 (15 May 2023 08:26) (18 - 18)  SpO2: 97% (15 May 2023 09:24) (97% - 100%)    Parameters below as of 15 May 2023 09:24  Patient On (Oxygen Delivery Method): room air      I&O's Summary    14 May 2023 07:01  -  15 May 2023 07:00  --------------------------------------------------------  IN: 600 mL / OUT: 0 mL / NET: 600 mL        Constitutional: NAD, awake and alert  Neurological: AAO x 3, no focal deficits  HEENT: PERRLA, EOMI, MMM  Neck: Soft and supple, No LAD, No JVD  Respiratory: Breath sounds are clear bilaterally, No wheezing, rales or rhonchi  Cardiovascular: S1 and S2, regular rate and rhythm; no Murmurs, gallops or rubs  Gastrointestinal: Bowel Sounds present, soft, nontender, nondistended, no guarding, no rebound tenderness  Back: No CVA tenderness   Extremities: No peripheral edema  Vascular: 2+ peripheral pulses  Musculoskeletal: 5/5 strength b/l upper and lower extremities  Skin: No rashes  Breast: Deferred  Rectal: Deferred    MEDICATIONS  (STANDING):  acetylcysteine 10%  Inhalation 4 milliLiter(s) Inhalation three times a day  albuterol/ipratropium for Nebulization 3 milliLiter(s) Nebulizer every 6 hours  apixaban 5 milliGRAM(s) Oral two times a day  buDESOnide    Inhalation Suspension 0.5 milliGRAM(s) Inhalation every 12 hours  dextrose 5%. 1000 milliLiter(s) (100 mL/Hr) IV Continuous <Continuous>  dextrose 5%. 1000 milliLiter(s) (50 mL/Hr) IV Continuous <Continuous>  dextrose 50% Injectable 25 Gram(s) IV Push once  dextrose 50% Injectable 12.5 Gram(s) IV Push once  dextrose 50% Injectable 25 Gram(s) IV Push once  famotidine    Tablet 20 milliGRAM(s) Oral daily  fluconAZOLE   Tablet 100 milliGRAM(s) Oral daily  glucagon  Injectable 1 milliGRAM(s) IntraMuscular once  insulin glargine Injectable (LANTUS) 30 Unit(s) SubCutaneous at bedtime  insulin lispro (ADMELOG) corrective regimen sliding scale   SubCutaneous at bedtime  insulin lispro (ADMELOG) corrective regimen sliding scale   SubCutaneous three times a day before meals  magnesium oxide 400 milliGRAM(s) Oral two times a day with meals  mexiletine 200 milliGRAM(s) Oral every 8 hours  montelukast 10 milliGRAM(s) Oral at bedtime  nystatin    Suspension 780506 Unit(s) Oral every 6 hours  pantoprazole    Tablet 40 milliGRAM(s) Oral before breakfast  polyethylene glycol 3350 17 Gram(s) Oral daily  predniSONE   Tablet 20 milliGRAM(s) Oral two times a day      MICROBIOLOGY/CULTURES:  Culture Results:   Moderate Proteus mirabilis  Normal Respiratory Jessica present (05-13 @ 15:02)        RADIOLOGY/EKG:      < from: Xray Chest 1 View- PORTABLE-Urgent (Xray Chest 1 View- PORTABLE-Urgent .) (05.04.23 @ 10:47) >    IMPRESSION: Persistent small density right base laterally.    < end of copied text >  < from: CT Chest No Cont (05.04.23 @ 13:36) >    IMPRESSION:    Increased infectious or inflammatory bronchitis/bronchiolitis since   4/5/2023.    < end of copied text >

## 2023-05-15 NOTE — PROGRESS NOTE ADULT - SUBJECTIVE AND OBJECTIVE BOX
Subjective:    Awake, alert. No new pulmonary complaints.    5/13: awake, alert, no distress, is improving.  says is bringing up some greenish sputum.  5/13: awake, alert, has cough, some greenish sputum.   5/15: Awake and alert, sitting up in bed eating breakfast. + cough productive of greenish brown sputum, no fever or chills. Saturating well on room air. No KRAMER/SOB.       MEDICATIONS  (STANDING):  acetylcysteine 10%  Inhalation 4 milliLiter(s) Inhalation three times a day  albuterol/ipratropium for Nebulization 3 milliLiter(s) Nebulizer every 6 hours  apixaban 5 milliGRAM(s) Oral two times a day  buDESOnide    Inhalation Suspension 0.5 milliGRAM(s) Inhalation every 12 hours  dextrose 5%. 1000 milliLiter(s) (100 mL/Hr) IV Continuous <Continuous>  dextrose 5%. 1000 milliLiter(s) (50 mL/Hr) IV Continuous <Continuous>  dextrose 50% Injectable 25 Gram(s) IV Push once  dextrose 50% Injectable 12.5 Gram(s) IV Push once  dextrose 50% Injectable 25 Gram(s) IV Push once  famotidine    Tablet 20 milliGRAM(s) Oral daily  fluconAZOLE   Tablet 100 milliGRAM(s) Oral daily  glucagon  Injectable 1 milliGRAM(s) IntraMuscular once  insulin glargine Injectable (LANTUS) 30 Unit(s) SubCutaneous at bedtime  insulin lispro (ADMELOG) corrective regimen sliding scale   SubCutaneous three times a day before meals  insulin lispro (ADMELOG) corrective regimen sliding scale   SubCutaneous at bedtime  magnesium oxide 400 milliGRAM(s) Oral two times a day with meals  mexiletine 200 milliGRAM(s) Oral every 8 hours  montelukast 10 milliGRAM(s) Oral at bedtime  nystatin    Suspension 089161 Unit(s) Oral every 6 hours  pantoprazole    Tablet 40 milliGRAM(s) Oral before breakfast  polyethylene glycol 3350 17 Gram(s) Oral daily  predniSONE   Tablet 20 milliGRAM(s) Oral two times a day    MEDICATIONS  (PRN):  acetaminophen     Tablet .. 650 milliGRAM(s) Oral every 6 hours PRN Temp greater or equal to 38C (100.4F), Mild Pain (1 - 3)  albuterol    90 MICROgram(s) HFA Inhaler 1 Puff(s) Inhalation four times a day PRN Shortness of Breath and/or Wheezing  albuterol    90 MICROgram(s) HFA Inhaler 2 Puff(s) Inhalation every 4 hours PRN Shortness of Breath  aluminum hydroxide/magnesium hydroxide/simethicone Suspension 30 milliLiter(s) Oral every 4 hours PRN Dyspepsia  dextrose Oral Gel 15 Gram(s) Oral once PRN Blood Glucose LESS THAN 70 milliGRAM(s)/deciliter  guaifenesin/dextromethorphan Oral Liquid 10 milliLiter(s) Oral every 4 hours PRN Cough  melatonin 3 milliGRAM(s) Oral at bedtime PRN Insomnia  ondansetron Injectable 4 milliGRAM(s) IV Push every 8 hours PRN Nausea and/or Vomiting  simethicone 80 milliGRAM(s) Chew three times a day PRN Gas        Allergies    iodine (Short breath; Swelling)  Avelox (Short breath; Pruritus)  shellfish (Anaphylaxis)  aspirin (Short breath)  Dilaudid (Short breath)  codeine (Short breath)  cefepime (Anaphylaxis)  penicillin (Anaphylaxis)  tetanus toxoid (Short breath)  Valium (Short breath)    Intolerances        Vital Signs Last 24 Hrs  T(C): 36.6 (15 May 2023 08:26), Max: 36.8 (14 May 2023 23:25)  T(F): 97.8 (15 May 2023 08:26), Max: 98.2 (14 May 2023 23:25)  HR: 66 (15 May 2023 09:24) (66 - 88)  BP: 128/78 (15 May 2023 08:26) (125/71 - 136/93)  RR: 18 (15 May 2023 08:26) (18 - 18)  SpO2: 97% (15 May 2023 09:24) (97% - 100%)    Parameters below as of 15 May 2023 09:24  Patient On (Oxygen Delivery Method): room air    PHYSICAL EXAMINATION:    NECK:  Supple. No lymphadenopathy. Jugular venous pressure not elevated.  HEART:   The cardiac impulse has a normal quality. Reg., Nl S1 and S2.    CHEST:  Scattered rhonchi, no wheezing. Normal respiratory effort.  ABDOMEN:  Soft and nontender.   EXTREMITIES:  There is no edema.       LABS:                          11.1   23.68 )-----------( 218      ( 12 May 2023 07:40 )             37.1     05-12    139  |  104  |  19  ----------------------------<  232<H>  4.8   |  30  |  0.76    Ca    8.4<L>      12 May 2023 07:40      CULTURE:   Culture - Sputum . (05.13.23 @ 15:02)   Gram Stain:   Moderate polymorphonuclear leukocytes per low power field   No Squamous epithelial cells per low power field   Moderate Gram Negative Rods per oil power field  Specimen Source: .Sputum Sputum  Culture Results:   Moderate Proteus mirabilis   Normal Respiratory Jessica present    RADIOLOGY & ADDITIONAL TESTS:  < from: CT Chest No Cont (05.04.23 @ 13:36) >  INTERPRETATION:  INDICATION: Cough    TECHNIQUE: Volumetric images of the chest without intravenous contrast.   Maximum intensity projection images were generated.    COMPARISON: CT chest 4/5/2023.    FINDINGS:    LUNGS/AIRWAYS/PLEURA: Increased tree-in-bud in both lungs, preferential   to the right lower lobe. Several mucous impacted subsegmental bronchi and   new airway wall thickening in the right lower lobe. New lingular opacity   along the oblique fissure, likely atelectasis. Dendriform pulmonary   ossification in the right lower lobe. Upper lobe predominant paraseptal   emphysema. Unchanged atelectasis or scarring in the lower lobes and   middle lobe. No pleural effusion.    LYMPH NODES/MEDIASTINUM: Unremarkable.    HEART/VASCULATURE: Aortic valve and a ascending aortic replacement.   Normal heart size. No pericardial effusion. Unchanged probable calcified   fibrin sheath in the right brachiocephalic vein. Unchanged displaced   intimal calcification in the descending aorta compatible with a   dissection.    UPPER ABDOMEN: Unchanged pancreatic tail cystic lesions and partially   calcified indeterminate left renal lesion.    BONES/SOFT TISSUES: Sternotomy.      IMPRESSION:    Increased infectious or inflammatory bronchitis/bronchiolitis since   4/5/2023.    < end of copied text >

## 2023-05-15 NOTE — PROGRESS NOTE ADULT - ASSESSMENT
#Sepsis as evidenced by hypotension, fever : No PNA  #Parainfluenza Bronchiolitis  # Hx of Bronchiectasis  # Oral thrush  #Asthma exacerbation  - ICU eval for hypotension appreciated; BP responded to iv fluids  - duonebs, alb inh prn  - pulm cs appreciated   - ID cs appreciated   - S/P  meropenem/vanco  - antitussives  - solumedrol 20 mg q 8 hrs>>>> taper to oral prednisone 20mg BID, if tolerates tommarow, DC   diflucan  smart vest for chest PTx  sputum gram stain +, Sputum Cx grew moderate  P. mirabilis, Discussed with ID and await sensitivity  Pulse ox on ambulation WNL    # C/o Abd Gas, belching  -PO pepcid  -maalox  -lots of air in the colostomy bag  -patient requesting GI consult. f/u reccomendations    #T2DM  - resume home dose insulin  ISS  - monitor FS  changed diet to [facility] diet but refusing it and asking for regular diet.   Glucerna 1 can  tid    #Parox Afib, TIA  - c/w eliquis    # DVT PPX: on Eliquis      poc discussed with pt, team, Dr. Wilian MEHTA and advance care planning meeting done with the patient. She wishes to be fully resuscitated and mechanically ventilated if need arises. There are no limitations of any sort in her medical care and management. She is FULL CODE.

## 2023-05-16 LAB
ANION GAP SERPL CALC-SCNC: 4 MMOL/L — LOW (ref 5–17)
BUN SERPL-MCNC: 24 MG/DL — HIGH (ref 7–23)
CALCIUM SERPL-MCNC: 8.7 MG/DL — SIGNIFICANT CHANGE UP (ref 8.5–10.1)
CHLORIDE SERPL-SCNC: 107 MMOL/L — SIGNIFICANT CHANGE UP (ref 96–108)
CO2 SERPL-SCNC: 29 MMOL/L — SIGNIFICANT CHANGE UP (ref 22–31)
CREAT SERPL-MCNC: 0.71 MG/DL — SIGNIFICANT CHANGE UP (ref 0.5–1.3)
EGFR: 89 ML/MIN/1.73M2 — SIGNIFICANT CHANGE UP
GLUCOSE BLDC GLUCOMTR-MCNC: 229 MG/DL — HIGH (ref 70–99)
GLUCOSE BLDC GLUCOMTR-MCNC: 297 MG/DL — HIGH (ref 70–99)
GLUCOSE BLDC GLUCOMTR-MCNC: 313 MG/DL — HIGH (ref 70–99)
GLUCOSE BLDC GLUCOMTR-MCNC: 339 MG/DL — HIGH (ref 70–99)
GLUCOSE SERPL-MCNC: 355 MG/DL — HIGH (ref 70–99)
HCT VFR BLD CALC: 40.1 % — SIGNIFICANT CHANGE UP (ref 34.5–45)
HGB BLD-MCNC: 11.9 G/DL — SIGNIFICANT CHANGE UP (ref 11.5–15.5)
MAGNESIUM SERPL-MCNC: 2.5 MG/DL — SIGNIFICANT CHANGE UP (ref 1.6–2.6)
MCHC RBC-ENTMCNC: 21.7 PG — LOW (ref 27–34)
MCHC RBC-ENTMCNC: 29.7 GM/DL — LOW (ref 32–36)
MCV RBC AUTO: 73.2 FL — LOW (ref 80–100)
PHOSPHATE SERPL-MCNC: 2.6 MG/DL — SIGNIFICANT CHANGE UP (ref 2.5–4.5)
PLATELET # BLD AUTO: 197 K/UL — SIGNIFICANT CHANGE UP (ref 150–400)
POTASSIUM SERPL-MCNC: 5 MMOL/L — SIGNIFICANT CHANGE UP (ref 3.5–5.3)
POTASSIUM SERPL-SCNC: 5 MMOL/L — SIGNIFICANT CHANGE UP (ref 3.5–5.3)
RBC # BLD: 5.48 M/UL — HIGH (ref 3.8–5.2)
RBC # FLD: 23.9 % — HIGH (ref 10.3–14.5)
SODIUM SERPL-SCNC: 140 MMOL/L — SIGNIFICANT CHANGE UP (ref 135–145)
WBC # BLD: 12.53 K/UL — HIGH (ref 3.8–10.5)
WBC # FLD AUTO: 12.53 K/UL — HIGH (ref 3.8–10.5)

## 2023-05-16 PROCEDURE — 99233 SBSQ HOSP IP/OBS HIGH 50: CPT

## 2023-05-16 PROCEDURE — 71045 X-RAY EXAM CHEST 1 VIEW: CPT | Mod: 26

## 2023-05-16 PROCEDURE — 99232 SBSQ HOSP IP/OBS MODERATE 35: CPT | Mod: GC

## 2023-05-16 RX ORDER — MEROPENEM 1 G/30ML
1000 INJECTION INTRAVENOUS EVERY 8 HOURS
Refills: 0 | Status: DISCONTINUED | OUTPATIENT
Start: 2023-05-16 | End: 2023-05-22

## 2023-05-16 RX ORDER — PANTOPRAZOLE SODIUM 20 MG/1
40 TABLET, DELAYED RELEASE ORAL
Refills: 0 | Status: DISCONTINUED | OUTPATIENT
Start: 2023-05-16 | End: 2023-05-22

## 2023-05-16 RX ADMIN — MONTELUKAST 10 MILLIGRAM(S): 4 TABLET, CHEWABLE ORAL at 22:00

## 2023-05-16 RX ADMIN — FAMOTIDINE 20 MILLIGRAM(S): 10 INJECTION INTRAVENOUS at 11:03

## 2023-05-16 RX ADMIN — Medication 500000 UNIT(S): at 17:42

## 2023-05-16 RX ADMIN — APIXABAN 5 MILLIGRAM(S): 2.5 TABLET, FILM COATED ORAL at 22:00

## 2023-05-16 RX ADMIN — Medication 3 MILLILITER(S): at 07:57

## 2023-05-16 RX ADMIN — MEXILETINE HYDROCHLORIDE 200 MILLIGRAM(S): 150 CAPSULE ORAL at 14:38

## 2023-05-16 RX ADMIN — MAGNESIUM OXIDE 400 MG ORAL TABLET 400 MILLIGRAM(S): 241.3 TABLET ORAL at 11:03

## 2023-05-16 RX ADMIN — Medication 4: at 22:04

## 2023-05-16 RX ADMIN — Medication 20 MILLIGRAM(S): at 22:02

## 2023-05-16 RX ADMIN — Medication 4: at 17:44

## 2023-05-16 RX ADMIN — Medication 4 MILLILITER(S): at 13:45

## 2023-05-16 RX ADMIN — Medication 4 MILLILITER(S): at 07:57

## 2023-05-16 RX ADMIN — MAGNESIUM OXIDE 400 MG ORAL TABLET 400 MILLIGRAM(S): 241.3 TABLET ORAL at 17:42

## 2023-05-16 RX ADMIN — APIXABAN 5 MILLIGRAM(S): 2.5 TABLET, FILM COATED ORAL at 11:03

## 2023-05-16 RX ADMIN — Medication 30 MILLILITER(S): at 00:44

## 2023-05-16 RX ADMIN — INSULIN GLARGINE 30 UNIT(S): 100 INJECTION, SOLUTION SUBCUTANEOUS at 22:05

## 2023-05-16 RX ADMIN — Medication 0.5 MILLIGRAM(S): at 07:58

## 2023-05-16 RX ADMIN — Medication 6: at 08:58

## 2023-05-16 RX ADMIN — Medication 500000 UNIT(S): at 22:51

## 2023-05-16 RX ADMIN — MEROPENEM 100 MILLIGRAM(S): 1 INJECTION INTRAVENOUS at 14:38

## 2023-05-16 RX ADMIN — Medication 30 MILLILITER(S): at 05:15

## 2023-05-16 RX ADMIN — PANTOPRAZOLE SODIUM 40 MILLIGRAM(S): 20 TABLET, DELAYED RELEASE ORAL at 05:15

## 2023-05-16 RX ADMIN — MEXILETINE HYDROCHLORIDE 200 MILLIGRAM(S): 150 CAPSULE ORAL at 21:59

## 2023-05-16 RX ADMIN — Medication 500000 UNIT(S): at 05:15

## 2023-05-16 RX ADMIN — Medication 3 MILLILITER(S): at 13:44

## 2023-05-16 RX ADMIN — MEROPENEM 100 MILLIGRAM(S): 1 INJECTION INTRAVENOUS at 22:07

## 2023-05-16 RX ADMIN — Medication 30 MILLILITER(S): at 12:32

## 2023-05-16 RX ADMIN — MEXILETINE HYDROCHLORIDE 200 MILLIGRAM(S): 150 CAPSULE ORAL at 05:15

## 2023-05-16 RX ADMIN — Medication 500000 UNIT(S): at 11:03

## 2023-05-16 RX ADMIN — PANTOPRAZOLE SODIUM 40 MILLIGRAM(S): 20 TABLET, DELAYED RELEASE ORAL at 21:59

## 2023-05-16 RX ADMIN — Medication 3 MILLILITER(S): at 21:29

## 2023-05-16 RX ADMIN — Medication 4 MILLILITER(S): at 21:28

## 2023-05-16 RX ADMIN — Medication 8: at 13:03

## 2023-05-16 RX ADMIN — Medication 20 MILLIGRAM(S): at 11:04

## 2023-05-16 RX ADMIN — Medication 0.5 MILLIGRAM(S): at 21:29

## 2023-05-16 RX ADMIN — FLUCONAZOLE 100 MILLIGRAM(S): 150 TABLET ORAL at 11:03

## 2023-05-16 NOTE — PROGRESS NOTE ADULT - SUBJECTIVE AND OBJECTIVE BOX
HOSPITALIST PROGRESS NOTE:  SUBJECTIVE:  PCP:  Chief Complaint: Patient is a 72y old  Female who presents with a chief complaint of fever (15 May 2023 10:15)      HPI:  HPI:  72F w/PMH TIA, DVT, Colorectal CA s/p tx, Tracheobronchomalacia s/p Tracheobronchoplasty 2016 (no further evidence), adrenal insufficiency on steroids, COPD/Asthma, Afib/AC, T2DM, recent admit 2 mos ago,  presents from NH for fever x2 days.  She had Picc line placed and started on meropenem empirically 2 days ago, but continues to have fever and worsening productive cough and sob. Pt is AAOx3 and gives her own hx.  She endorses dry heaves and vomiting  last night, denies any diarrhea/abd pain.  At time of my exam, pt found to be hypotensive on repeat bp checks.      In ED, MD d/w ID, rec to remove picc line (to be done by ED), given vanco iV, 1L ivf, duoneb, +febrile, CT chest: Increased infectious or inflammatory bronchitis/bronchiolitis since   4/5/2023.    5/15: Patient has no complaints. NO dyspnea; Sputum cutlures came back proteus miraboilis. patient continues to have belching; improved with mylicon; No abd pain, CP  5/16:  Patient upset that GI hasnt seen her for th acid reflux; sputum cx came back for proteus and ESBL; No other complaints     Allergies:  iodine (Short breath; Swelling)  Avelox (Short breath; Pruritus)  shellfish (Anaphylaxis)  aspirin (Short breath)  Dilaudid (Short breath)  codeine (Short breath)  cefepime (Anaphylaxis)  penicillin (Anaphylaxis)  tetanus toxoid (Short breath)  Valium (Short breath)    REVIEW OF SYSTEMS:  See HPI. All other review of systems is negative unless indicated above.     OBJECTIVE  Physical Exam:  Vital Signs Last 24 Hrs  T(C): 36.1 (16 May 2023 08:15), Max: 37 (15 May 2023 23:15)  T(F): 97 (16 May 2023 08:15), Max: 98.6 (15 May 2023 23:15)  HR: 68 (16 May 2023 08:15) (66 - 82)  BP: 110/66 (16 May 2023 08:15) (110/66 - 130/66)  BP(mean): --  RR: 18 (16 May 2023 08:15) (18 - 18)  SpO2: 100% (16 May 2023 08:15) (96% - 100%)    Parameters below as of 16 May 2023 08:15  Patient On (Oxygen Delivery Method): room air    Constitutional: NAD, awake and alert  Neurological: AAO x 3, no focal deficits  HEENT: PERRLA, EOMI, MMM  Neck: Soft and supple, No LAD, No JVD  Respiratory: Breath sounds are clear bilaterally, No wheezing, rales or rhonchi  Cardiovascular: S1 and S2, regular rate and rhythm; no Murmurs, gallops or rubs  Gastrointestinal: Bowel Sounds present, soft, nontender, nondistended, no guarding, no rebound tenderness  Back: No CVA tenderness   Extremities: No peripheral edema  Vascular: 2+ peripheral pulses  Musculoskeletal: 5/5 strength b/l upper and lower extremities  Skin: No rashes  Breast: Deferred  Rectal: Deferred    MEDICATIONS  (STANDING):  acetylcysteine 10%  Inhalation 4 milliLiter(s) Inhalation three times a day  albuterol/ipratropium for Nebulization 3 milliLiter(s) Nebulizer every 6 hours  apixaban 5 milliGRAM(s) Oral two times a day  buDESOnide    Inhalation Suspension 0.5 milliGRAM(s) Inhalation every 12 hours  dextrose 5%. 1000 milliLiter(s) (100 mL/Hr) IV Continuous <Continuous>  dextrose 5%. 1000 milliLiter(s) (50 mL/Hr) IV Continuous <Continuous>  dextrose 50% Injectable 25 Gram(s) IV Push once  dextrose 50% Injectable 12.5 Gram(s) IV Push once  dextrose 50% Injectable 25 Gram(s) IV Push once  famotidine    Tablet 20 milliGRAM(s) Oral daily  fluconAZOLE   Tablet 100 milliGRAM(s) Oral daily  glucagon  Injectable 1 milliGRAM(s) IntraMuscular once  insulin glargine Injectable (LANTUS) 30 Unit(s) SubCutaneous at bedtime  insulin lispro (ADMELOG) corrective regimen sliding scale   SubCutaneous at bedtime  insulin lispro (ADMELOG) corrective regimen sliding scale   SubCutaneous three times a day before meals  magnesium oxide 400 milliGRAM(s) Oral two times a day with meals  mexiletine 200 milliGRAM(s) Oral every 8 hours  montelukast 10 milliGRAM(s) Oral at bedtime  nystatin    Suspension 235034 Unit(s) Oral every 6 hours  pantoprazole    Tablet 40 milliGRAM(s) Oral before breakfast  polyethylene glycol 3350 17 Gram(s) Oral daily  predniSONE   Tablet 20 milliGRAM(s) Oral two times a day      MICROBIOLOGY/CULTURES:  Culture Results:   Moderate Proteus mirabilis  Normal Respiratory Jessica present (05-13 @ 15:02)        RADIOLOGY/EKG:      < from: Xray Chest 1 View- PORTABLE-Urgent (Xray Chest 1 View- PORTABLE-Urgent .) (05.04.23 @ 10:47) >    IMPRESSION: Persistent small density right base laterally.    < end of copied text >  < from: CT Chest No Cont (05.04.23 @ 13:36) >    IMPRESSION:    Increased infectious or inflammatory bronchitis/bronchiolitis since   4/5/2023.    < end of copied text >

## 2023-05-16 NOTE — PROGRESS NOTE ADULT - SUBJECTIVE AND OBJECTIVE BOX
Date of service: 23 @ 09:25    Events noted   Reported increased sputum production  Sputum looks darker  Mild SOB with exercise    ROS: denies dizziness, no HA, no abdominal pain, no diarrhea or constipation; no dysuria, no legs pain, no rashes    MEDICATIONS  (STANDING):  acetylcysteine 10%  Inhalation 4 milliLiter(s) Inhalation three times a day  albuterol/ipratropium for Nebulization 3 milliLiter(s) Nebulizer every 6 hours  apixaban 5 milliGRAM(s) Oral two times a day  buDESOnide    Inhalation Suspension 0.5 milliGRAM(s) Inhalation every 12 hours  dextrose 5%. 1000 milliLiter(s) (100 mL/Hr) IV Continuous <Continuous>  dextrose 5%. 1000 milliLiter(s) (50 mL/Hr) IV Continuous <Continuous>  dextrose 50% Injectable 25 Gram(s) IV Push once  dextrose 50% Injectable 12.5 Gram(s) IV Push once  dextrose 50% Injectable 25 Gram(s) IV Push once  famotidine    Tablet 20 milliGRAM(s) Oral daily  fluconAZOLE   Tablet 100 milliGRAM(s) Oral daily  glucagon  Injectable 1 milliGRAM(s) IntraMuscular once  insulin glargine Injectable (LANTUS) 30 Unit(s) SubCutaneous at bedtime  insulin lispro (ADMELOG) corrective regimen sliding scale   SubCutaneous at bedtime  insulin lispro (ADMELOG) corrective regimen sliding scale   SubCutaneous three times a day before meals  magnesium oxide 400 milliGRAM(s) Oral two times a day with meals  mexiletine 200 milliGRAM(s) Oral every 8 hours  montelukast 10 milliGRAM(s) Oral at bedtime  nystatin    Suspension 912744 Unit(s) Oral every 6 hours  pantoprazole    Tablet 40 milliGRAM(s) Oral before breakfast  polyethylene glycol 3350 17 Gram(s) Oral daily  predniSONE   Tablet 20 milliGRAM(s) Oral two times a day    Vital Signs Last 24 Hrs  T(C): 36.1 (16 May 2023 08:15), Max: 37 (15 May 2023 23:15)  T(F): 97 (16 May 2023 08:15), Max: 98.6 (15 May 2023 23:15)  HR: 68 (16 May 2023 08:15) (66 - 82)  BP: 110/66 (16 May 2023 08:15) (110/66 - 130/66)  BP(mean): --  RR: 18 (16 May 2023 08:15) (18 - 18)  SpO2: 100% (16 May 2023 08:15) (96% - 100%)    Parameters below as of 16 May 2023 08:15  Patient On (Oxygen Delivery Method): room air     Physical exam:    Constitutional:  No acute distress  HEENT: NC/AT, EOMI, PERRLA, conjunctivae clear; ears and nose atraumatic  Neck: supple; thyroid not palpable  Back: no tenderness  Respiratory: respiratory effort normal; few crackles at bases  Cardiovascular: S1S2 regular, no murmurs  Abdomen: soft, not tender, not distended, positive BS  Genitourinary: no suprapubic tenderness  Lymphatic: no LN palpable  Musculoskeletal: no muscle tenderness, no joint swelling or tenderness  Extremities: no pedal edema  Neurological/ Psychiatric: AxOx3, judgement and insight normal; moving all extremities  Skin: no rashes; no palpable lesions    Labs: reviewed                        11.9   12.53 )-----------( 197      ( 16 May 2023 06:23 )             40.1     -    140  |  107  |  24<H>  ----------------------------<  355<H>  5.0   |  29  |  0.71    Ca    8.7      16 May 2023 06:23  Phos  2.6     05-  Mg     2.5                             11.8   8.07  )-----------( 181      ( 05 May 2023 06:21 )             42.0     05-    137  |  107  |  12  ----------------------------<  248<H>  4.9   |  23  |  0.56    Ca    8.7      05 May 2023 06:21    TPro  7.6  /  Alb  3.2<L>  /  TBili  0.3  /  DBili  x   /  AST  27  /  ALT  21  /  AlkPhos  104  05-04     LIVER FUNCTIONS - ( 04 May 2023 09:51 )  Alb: 3.2 g/dL / Pro: 7.6 gm/dL / ALK PHOS: 104 U/L / ALT: 21 U/L / AST: 27 U/L / GGT: x           Urinalysis Basic - ( 04 May 2023 13:31 )    Color: Yellow / Appearance: Clear / S.015 / pH: x  Gluc: x / Ketone: Small  / Bili: Negative / Urobili: Negative   Blood: x / Protein: Negative / Nitrite: Negative   Leuk Esterase: Negative / RBC: 6-10 /HPF / WBC 3-5 /HPF   Sq Epi: x / Non Sq Epi: x / Bacteria: Occasional    Parainfluenza 3 ( @ 09:51)  Detected      Culture - Sputum (collected 13 May 2023 15:02)  Source: .Sputum Sputum  Gram Stain (14 May 2023 00:02):    Moderate polymorphonuclear leukocytes per low power field    No Squamous epithelial cells per low power field    Moderate Gram Negative Rods per oil power field  Final Report (15 May 2023 21:45):    Numerous Proteus mirabilis ESBL    Normal Respiratory Jessica present  Organism: Proteus mirabilis ESBL (15 May 2023 21:45)  Organism: Proteus mirabilis ESBL (15 May 2023 21:45)      Method Type: GARRETT      -  Amikacin: S <=16      -  Amoxicillin/Clavulanic Acid: S <=8/4      -  Ampicillin: R >16 These ampicillin results predict results for amoxicillin      -  Ampicillin/Sulbactam: R <=4/2 Enterobacter, Klebsiella aerogenes, Citrobacter, and Serratia may develop resistance during prolonged therapy (3-4 days)      -  Aztreonam: R >16      -  Cefazolin: R >16 Enterobacter, Klebsiella aerogenes, Citrobacter, and Serratia may develop resistance during prolonged therapy (3-4 days)      -  Cefepime: R >16      -  Ceftriaxone: R >32 Enterobacter, Klebsiella aerogenes, Citrobacter, and Serratia may develop resistance during prolonged therapy      -  Ciprofloxacin: R >2      -  Ertapenem: S <=0.5      -  Gentamicin: R >8      -  Levofloxacin: R >4      -  Meropenem: S <=1      -  Piperacillin/Tazobactam: R <=8      -  Tobramycin: R >8      -  Trimethoprim/Sulfamethoxazole: R >2/38        Radiology: all available radiological tests reviewed    < from: CT Chest No Cont (23 @ 13:36) >  Increased infectious or inflammatory bronchitis/bronchiolitis since 2023.  < end of copied text >        Advanced directives addressed: full resuscitation

## 2023-05-16 NOTE — PROGRESS NOTE ADULT - SUBJECTIVE AND OBJECTIVE BOX
Subjective:    Awake, alert. Cough productive of green sputum.    MEDICATIONS  (STANDING):  acetylcysteine 10%  Inhalation 4 milliLiter(s) Inhalation three times a day  albuterol/ipratropium for Nebulization 3 milliLiter(s) Nebulizer every 6 hours  apixaban 5 milliGRAM(s) Oral two times a day  buDESOnide    Inhalation Suspension 0.5 milliGRAM(s) Inhalation every 12 hours  dextrose 5%. 1000 milliLiter(s) (100 mL/Hr) IV Continuous <Continuous>  dextrose 5%. 1000 milliLiter(s) (50 mL/Hr) IV Continuous <Continuous>  dextrose 50% Injectable 25 Gram(s) IV Push once  dextrose 50% Injectable 12.5 Gram(s) IV Push once  dextrose 50% Injectable 25 Gram(s) IV Push once  famotidine    Tablet 20 milliGRAM(s) Oral daily  fluconAZOLE   Tablet 100 milliGRAM(s) Oral daily  glucagon  Injectable 1 milliGRAM(s) IntraMuscular once  insulin glargine Injectable (LANTUS) 30 Unit(s) SubCutaneous at bedtime  insulin lispro (ADMELOG) corrective regimen sliding scale   SubCutaneous three times a day before meals  insulin lispro (ADMELOG) corrective regimen sliding scale   SubCutaneous at bedtime  magnesium oxide 400 milliGRAM(s) Oral two times a day with meals  mexiletine 200 milliGRAM(s) Oral every 8 hours  montelukast 10 milliGRAM(s) Oral at bedtime  nystatin    Suspension 216361 Unit(s) Oral every 6 hours  pantoprazole    Tablet 40 milliGRAM(s) Oral before breakfast  polyethylene glycol 3350 17 Gram(s) Oral daily  predniSONE   Tablet 20 milliGRAM(s) Oral two times a day    MEDICATIONS  (PRN):  acetaminophen     Tablet .. 650 milliGRAM(s) Oral every 6 hours PRN Temp greater or equal to 38C (100.4F), Mild Pain (1 - 3)  albuterol    90 MICROgram(s) HFA Inhaler 2 Puff(s) Inhalation every 4 hours PRN Shortness of Breath  aluminum hydroxide/magnesium hydroxide/simethicone Suspension 30 milliLiter(s) Oral every 4 hours PRN Dyspepsia  dextrose Oral Gel 15 Gram(s) Oral once PRN Blood Glucose LESS THAN 70 milliGRAM(s)/deciliter  guaifenesin/dextromethorphan Oral Liquid 10 milliLiter(s) Oral every 4 hours PRN Cough  melatonin 3 milliGRAM(s) Oral at bedtime PRN Insomnia  ondansetron Injectable 4 milliGRAM(s) IV Push every 8 hours PRN Nausea and/or Vomiting  simethicone 80 milliGRAM(s) Chew three times a day PRN Gas      Allergies    iodine (Short breath; Swelling)  Avelox (Short breath; Pruritus)  shellfish (Anaphylaxis)  aspirin (Short breath)  Dilaudid (Short breath)  codeine (Short breath)  cefepime (Anaphylaxis)  penicillin (Anaphylaxis)  tetanus toxoid (Short breath)  Valium (Short breath)    Intolerances        Vital Signs Last 24 Hrs  T(C): 36.1 (16 May 2023 08:15), Max: 37 (15 May 2023 23:15)  T(F): 97 (16 May 2023 08:15), Max: 98.6 (15 May 2023 23:15)  HR: 68 (16 May 2023 08:15) (66 - 82)  BP: 110/66 (16 May 2023 08:15) (110/66 - 130/66)  BP(mean): --  RR: 18 (16 May 2023 08:15) (18 - 18)  SpO2: 100% (16 May 2023 08:15) (96% - 100%)    Parameters below as of 16 May 2023 08:15  Patient On (Oxygen Delivery Method): room air        PHYSICAL EXAMINATION:    NECK:  Supple. No lymphadenopathy. Jugular venous pressure not elevated.   HEART:   The cardiac impulse has a normal quality. Reg., Nl S1 and S2.    CHEST:  Chest with scattered rhonchi. Fair air entry. Normal respiratory effort.  ABDOMEN:  Soft and nontender.   EXTREMITIES:  There is no edema.       LABS:                        11.9   12.53 )-----------( 197      ( 16 May 2023 06:23 )             40.1     05-16    140  |  107  |  24<H>  ----------------------------<  355<H>  5.0   |  29  |  0.71    Ca    8.7      16 May 2023 06:23  Phos  2.6     05-16  Mg     2.5     05-16            RADIOLOGY & ADDITIONAL TESTS: CXR-PND    Assessment/Plan    - Maintain prednisone 20 mg 2 times per day.   - Aerosols with Duoneb/Budesonide  - Mucomyst 10% TID  - Chest PT/Smart Vest  - Singulair  - Monitor O2 Sats  - OOB to chair  - Sputum Cx: moderate  P. mirabilis w/ ESBL. Will ask ID to evaluate  - Await CXR official reading    Problem/Recommendation - 1:  ·  Acute respiratory failure with hypoxia.   Problem/Recommendation - 2:  ·  Severe asthma.   Problem/Recommendation - 3:  ·  Dyspnea.   Problem/Recommendation - 4:  ·  Bronchiectasis.   Problem/Recommendation - 5:  ·  Parainfluenza infection.   Problem/Recommendation - 6:  ·  Tracheobronchomalacia.

## 2023-05-16 NOTE — PROGRESS NOTE ADULT - ASSESSMENT
#Sepsis as evidenced by hypotension, fever : No PNA  #Parainfluenza Bronchiolitis  # Hx of Bronchiectasis  # Oral thrush  #Asthma exacerbation  - ICU eval for hypotension appreciated; BP responded to iv fluids  - duonebs, alb inh prn  - pulm cs appreciated   - ID cs appreciated   - S/P  meropenem/vanco  - antitussives  - solumedrol 20 mg q 8 hrs>>>> taper to oral prednisone 20mg BID, if tolerates tommarow, DC   diflucan  smart vest for chest PTx  sputum gram stain +, Sputum Cx grew moderate  P. mirabilis and ESBL Started on IV meropenem (5/16)   Pulse ox on ambulation WNL    # C/o Abd Gas, belching  -PO pepcid  -maalox  -lots of air in the colostomy bag  -patient requesting GI consult. f/u reccomendations    #T2DM  - resume home dose insulin  ISS  - monitor FS  changed diet to [facility] diet but refusing it and asking for regular diet.   Glucerna 1 can  tid    #Parox Afib, TIA  - c/w eliquis    # DVT PPX: on Eliquis      poc discussed with pt, team, Dr. Wilian MEHTA and advance care planning meeting done with the patient. She wishes to be fully resuscitated and mechanically ventilated if need arises. There are no limitations of any sort in her medical care and management. She is FULL CODE.

## 2023-05-16 NOTE — PROGRESS NOTE ADULT - ASSESSMENT
71 y/o Female with h/o TIA, DVT, Colorectal CA s/p tx, Tracheobronchomalacia s/p tracheobronchoplasty 2016, adrenal insufficiency on steroids, COPD/Asthma, Afib/AC, T2DM was admitted on 5/4 from NH for fever x 2 days. At the NH she had PICC line placed and started on meropenem empirically 2 days PTA, but continues to have fever and worsening productive cough and SOB. She endorses dry heaves and vomiting  last night, denies any diarrhea/abd pain. In ER, pt found to be hypotensive and received vancomycin IV.     1. Acute on chronic respiratory failure. Acute bronchiolitis with PRMI-ESBL. Allergy to PCN with anaphylaxis.   -sputum c/s noted  -new PRMI reported on recent sputum culture ?pathogen vs colonizant  -respiratory she remains frail  -CT chest on 5/4 - no infiltrates noted  -repeat CXR shows stable infiltrates  -start meropenem 1 gm IV q8h  -reason for abx use and side effects reviewed with patient; monitor BMP   -abx choice will be difficult in donnie of her PCN allergy with anaphylaxis  -respiratory care  -pulmonary evaluation appreciated  -monitor temps  -f/u CBC  -supportive care  2. Other issues:   -care per medicine    d/w Dr. Whitfield and pulmonary NP

## 2023-05-17 LAB
GLUCOSE BLDC GLUCOMTR-MCNC: 213 MG/DL — HIGH (ref 70–99)
GLUCOSE BLDC GLUCOMTR-MCNC: 236 MG/DL — HIGH (ref 70–99)
GLUCOSE BLDC GLUCOMTR-MCNC: 363 MG/DL — HIGH (ref 70–99)
GLUCOSE BLDC GLUCOMTR-MCNC: 451 MG/DL — CRITICAL HIGH (ref 70–99)
GLUCOSE BLDC GLUCOMTR-MCNC: 464 MG/DL — CRITICAL HIGH (ref 70–99)

## 2023-05-17 PROCEDURE — 99232 SBSQ HOSP IP/OBS MODERATE 35: CPT | Mod: GC

## 2023-05-17 PROCEDURE — 99233 SBSQ HOSP IP/OBS HIGH 50: CPT

## 2023-05-17 RX ORDER — INSULIN GLARGINE 100 [IU]/ML
40 INJECTION, SOLUTION SUBCUTANEOUS AT BEDTIME
Refills: 0 | Status: DISCONTINUED | OUTPATIENT
Start: 2023-05-17 | End: 2023-05-20

## 2023-05-17 RX ORDER — INSULIN GLARGINE 100 [IU]/ML
10 INJECTION, SOLUTION SUBCUTANEOUS EVERY MORNING
Refills: 0 | Status: DISCONTINUED | OUTPATIENT
Start: 2023-05-18 | End: 2023-05-22

## 2023-05-17 RX ADMIN — APIXABAN 5 MILLIGRAM(S): 2.5 TABLET, FILM COATED ORAL at 21:57

## 2023-05-17 RX ADMIN — MEROPENEM 100 MILLIGRAM(S): 1 INJECTION INTRAVENOUS at 21:56

## 2023-05-17 RX ADMIN — MEXILETINE HYDROCHLORIDE 200 MILLIGRAM(S): 150 CAPSULE ORAL at 15:05

## 2023-05-17 RX ADMIN — Medication 20 MILLIGRAM(S): at 21:57

## 2023-05-17 RX ADMIN — Medication 500000 UNIT(S): at 05:59

## 2023-05-17 RX ADMIN — Medication 10: at 17:44

## 2023-05-17 RX ADMIN — PANTOPRAZOLE SODIUM 40 MILLIGRAM(S): 20 TABLET, DELAYED RELEASE ORAL at 09:06

## 2023-05-17 RX ADMIN — Medication 0.5 MILLIGRAM(S): at 20:44

## 2023-05-17 RX ADMIN — Medication 4 MILLILITER(S): at 14:40

## 2023-05-17 RX ADMIN — PANTOPRAZOLE SODIUM 40 MILLIGRAM(S): 20 TABLET, DELAYED RELEASE ORAL at 21:57

## 2023-05-17 RX ADMIN — Medication 4 MILLILITER(S): at 20:44

## 2023-05-17 RX ADMIN — Medication 500000 UNIT(S): at 13:02

## 2023-05-17 RX ADMIN — Medication 10 MILLILITER(S): at 15:04

## 2023-05-17 RX ADMIN — MEXILETINE HYDROCHLORIDE 200 MILLIGRAM(S): 150 CAPSULE ORAL at 05:59

## 2023-05-17 RX ADMIN — Medication 12: at 13:01

## 2023-05-17 RX ADMIN — SIMETHICONE 80 MILLIGRAM(S): 80 TABLET, CHEWABLE ORAL at 21:56

## 2023-05-17 RX ADMIN — Medication 500000 UNIT(S): at 17:44

## 2023-05-17 RX ADMIN — Medication 20 MILLIGRAM(S): at 09:07

## 2023-05-17 RX ADMIN — Medication 10 MILLILITER(S): at 09:07

## 2023-05-17 RX ADMIN — Medication 4: at 08:36

## 2023-05-17 RX ADMIN — Medication 3 MILLILITER(S): at 08:41

## 2023-05-17 RX ADMIN — MAGNESIUM OXIDE 400 MG ORAL TABLET 400 MILLIGRAM(S): 241.3 TABLET ORAL at 17:44

## 2023-05-17 RX ADMIN — MONTELUKAST 10 MILLIGRAM(S): 4 TABLET, CHEWABLE ORAL at 21:58

## 2023-05-17 RX ADMIN — FLUCONAZOLE 100 MILLIGRAM(S): 150 TABLET ORAL at 09:07

## 2023-05-17 RX ADMIN — INSULIN GLARGINE 40 UNIT(S): 100 INJECTION, SOLUTION SUBCUTANEOUS at 21:58

## 2023-05-17 RX ADMIN — Medication 0.5 MILLIGRAM(S): at 08:41

## 2023-05-17 RX ADMIN — MEROPENEM 100 MILLIGRAM(S): 1 INJECTION INTRAVENOUS at 05:59

## 2023-05-17 RX ADMIN — APIXABAN 5 MILLIGRAM(S): 2.5 TABLET, FILM COATED ORAL at 09:07

## 2023-05-17 RX ADMIN — Medication 3 MILLILITER(S): at 20:43

## 2023-05-17 RX ADMIN — MEROPENEM 100 MILLIGRAM(S): 1 INJECTION INTRAVENOUS at 15:04

## 2023-05-17 RX ADMIN — Medication 3 MILLILITER(S): at 14:40

## 2023-05-17 RX ADMIN — MAGNESIUM OXIDE 400 MG ORAL TABLET 400 MILLIGRAM(S): 241.3 TABLET ORAL at 09:07

## 2023-05-17 RX ADMIN — Medication 30 MILLILITER(S): at 21:58

## 2023-05-17 RX ADMIN — MEXILETINE HYDROCHLORIDE 200 MILLIGRAM(S): 150 CAPSULE ORAL at 21:56

## 2023-05-17 RX ADMIN — Medication 4 MILLILITER(S): at 08:41

## 2023-05-17 RX ADMIN — FAMOTIDINE 20 MILLIGRAM(S): 10 INJECTION INTRAVENOUS at 09:07

## 2023-05-17 NOTE — PROGRESS NOTE ADULT - SUBJECTIVE AND OBJECTIVE BOX
Subjective:    Awake, alert. Sputum not as dark in color.    MEDICATIONS  (STANDING):  acetylcysteine 10%  Inhalation 4 milliLiter(s) Inhalation three times a day  albuterol/ipratropium for Nebulization 3 milliLiter(s) Nebulizer every 6 hours  apixaban 5 milliGRAM(s) Oral two times a day  buDESOnide    Inhalation Suspension 0.5 milliGRAM(s) Inhalation every 12 hours  dextrose 5%. 1000 milliLiter(s) (100 mL/Hr) IV Continuous <Continuous>  dextrose 5%. 1000 milliLiter(s) (50 mL/Hr) IV Continuous <Continuous>  dextrose 50% Injectable 25 Gram(s) IV Push once  dextrose 50% Injectable 12.5 Gram(s) IV Push once  dextrose 50% Injectable 25 Gram(s) IV Push once  famotidine    Tablet 20 milliGRAM(s) Oral daily  fluconAZOLE   Tablet 100 milliGRAM(s) Oral daily  glucagon  Injectable 1 milliGRAM(s) IntraMuscular once  insulin glargine Injectable (LANTUS) 30 Unit(s) SubCutaneous at bedtime  insulin lispro (ADMELOG) corrective regimen sliding scale   SubCutaneous three times a day before meals  insulin lispro (ADMELOG) corrective regimen sliding scale   SubCutaneous at bedtime  magnesium oxide 400 milliGRAM(s) Oral two times a day with meals  meropenem  IVPB 1000 milliGRAM(s) IV Intermittent every 8 hours  mexiletine 200 milliGRAM(s) Oral every 8 hours  montelukast 10 milliGRAM(s) Oral at bedtime  nystatin    Suspension 686674 Unit(s) Oral every 6 hours  pantoprazole    Tablet 40 milliGRAM(s) Oral two times a day  polyethylene glycol 3350 17 Gram(s) Oral daily  predniSONE   Tablet 20 milliGRAM(s) Oral two times a day    MEDICATIONS  (PRN):  acetaminophen     Tablet .. 650 milliGRAM(s) Oral every 6 hours PRN Temp greater or equal to 38C (100.4F), Mild Pain (1 - 3)  albuterol    90 MICROgram(s) HFA Inhaler 2 Puff(s) Inhalation every 4 hours PRN Shortness of Breath  aluminum hydroxide/magnesium hydroxide/simethicone Suspension 30 milliLiter(s) Oral every 4 hours PRN Dyspepsia  dextrose Oral Gel 15 Gram(s) Oral once PRN Blood Glucose LESS THAN 70 milliGRAM(s)/deciliter  guaifenesin/dextromethorphan Oral Liquid 10 milliLiter(s) Oral every 4 hours PRN Cough  melatonin 3 milliGRAM(s) Oral at bedtime PRN Insomnia  ondansetron Injectable 4 milliGRAM(s) IV Push every 8 hours PRN Nausea and/or Vomiting  simethicone 80 milliGRAM(s) Chew three times a day PRN Gas      Allergies    iodine (Short breath; Swelling)  Avelox (Short breath; Pruritus)  shellfish (Anaphylaxis)  aspirin (Short breath)  Dilaudid (Short breath)  codeine (Short breath)  cefepime (Anaphylaxis)  penicillin (Anaphylaxis)  tetanus toxoid (Short breath)  Valium (Short breath)    Intolerances        Vital Signs Last 24 Hrs  T(C): 36.5 (17 May 2023 08:09), Max: 37.1 (16 May 2023 16:24)  T(F): 97.7 (17 May 2023 08:09), Max: 98.8 (16 May 2023 16:24)  HR: 68 (17 May 2023 08:09) (68 - 88)  BP: 124/76 (17 May 2023 08:09) (119/65 - 132/58)  BP(mean): --  RR: 17 (17 May 2023 08:09) (14 - 17)  SpO2: 100% (17 May 2023 08:09) (95% - 100%)    Parameters below as of 17 May 2023 08:09  Patient On (Oxygen Delivery Method): room air        PHYSICAL EXAMINATION:    NECK:  Supple. No lymphadenopathy. Jugular venous pressure not elevated.    HEART:   The cardiac impulse has a normal quality. Reg., Nl S1 and S2.    CHEST:  Chest iswith scattered wheezes/rhonchi. Normal respiratory effort.  ABDOMEN:  Soft and nontender.   EXTREMITIES:  There is no edema.       LABS:                        11.9   12.53 )-----------( 197      ( 16 May 2023 06:23 )             40.1     05-16    140  |  107  |  24<H>  ----------------------------<  355<H>  5.0   |  29  |  0.71    Ca    8.7      16 May 2023 06:23  Phos  2.6     05-16  Mg     2.5     05-16            RADIOLOGY & ADDITIONAL TESTS:    Assessment/Plan    - Maintain prednisone 20 mg 2 times per day.   - Aerosols with Duoneb/Budesonide  - Mucomyst 10% TID  - Chest PT/Smart Vest  - Singulair  - Monitor O2 Sats  - OOB to chair  - Sputum Cx: moderate  P. mirabilis w/ ESBL-Now on Meropenem  - Await CXR official reading    Problem/Recommendation - 1:  ·  Acute respiratory failure with hypoxia.   Problem/Recommendation - 2:  ·  Severe asthma.   Problem/Recommendation - 3:  ·  Dyspnea.   Problem/Recommendation - 4:  ·  Bronchiectasis.   Problem/Recommendation - 5:  ·  Parainfluenza infection.   Problem/Recommendation - 6:  ·  Tracheobronchomalacia.

## 2023-05-17 NOTE — GOALS OF CARE CONVERSATION - ADVANCED CARE PLANNING - CONVERSATION DETAILS
GOC and advance care planning meeting done with the patient. She wishes to be fully resuscitated and mechanically ventilated if need arises. There are no limitations of any sort in her medical care and management. She is FULL CODE.

## 2023-05-17 NOTE — PROGRESS NOTE ADULT - ASSESSMENT
#Sepsis as evidenced by hypotension, fever : No PNA  #Parainfluenza Bronchiolitis  # Hx of Bronchiectasis  # Oral thrush  #Asthma exacerbation  - ICU eval for hypotension appreciated; BP responded to iv fluids  - duonebs, alb inh prn  - pulm cs appreciated   - ID cs appreciated   - S/P  meropenem/vanco  - antitussives  - solumedrol 20 mg q 8 hrs- oral prednisone 20mg BID  diflucan  smart vest for chest PTx  sputum gram stain +, Sputum Cx grew moderate  P. mirabilis and ESBL Started on IV meropenem #2(5/16)   Pulse ox on ambulation WNL    # C/o Abd Gas, belching  -PPI BID   -maalox  -lots of air in the colostomy bag  -patient requesting GI consult. - trial of lactose free diet     #T2DM - uncontrolled 2ndry to steroids   - resume home dose insulin  - increase lantus 40 QHS and 10 in AM  - ISS  - monitor FS  changed diet to [facility] diet but refusing it and asking for regular diet.   Glucerna 1 can  tid    #Parox Afib, TIA  - c/w eliquis    # DVT PPX: on Eliquis      poc discussed with pt, team, Dr. Wilian MEHTA and advance care planning meeting done with the patient. She wishes to be fully resuscitated and mechanically ventilated if need arises. There are no limitations of any sort in her medical care and management. She is FULL CODE.    Dispo - FU witb ID regarding suraj, rehab once cleared

## 2023-05-17 NOTE — PROGRESS NOTE ADULT - ASSESSMENT
71 y/o Female with h/o TIA, DVT, Colorectal CA s/p tx, Tracheobronchomalacia s/p tracheobronchoplasty 2016, adrenal insufficiency on steroids, COPD/Asthma, Afib/AC, T2DM was admitted on 5/4 from NH for fever x 2 days. At the NH she had PICC line placed and started on meropenem empirically 2 days PTA, but continues to have fever and worsening productive cough and SOB. She endorses dry heaves and vomiting  last night, denies any diarrhea/abd pain. In ER, pt found to be hypotensive and received vancomycin IV.     1. Acute on chronic respiratory failure. Acute bronchiolitis with PRMI-ESBL. Allergy to PCN with anaphylaxis.   -sputum c/s noted  -new PRMI reported on recent sputum culture ?pathogen vs colonizant  -respiratory she remains frail  -on meropenem 1 gm IV q8h # 2  -tolerating abx well so far; no side effects noted  -monitor closely in donnie of PCN allergy history  -respiratory care  -pulmonary evaluation appreciated  -continue abx coverage   -monitor temps  -f/u CBC  -supportive care  2. Other issues:   -care per medicine    d/w Dr. Whitfield and pulmonary NP

## 2023-05-17 NOTE — CONSULT NOTE ADULT - ASSESSMENT
Excessive burping  GERD  REC  Trial of Lactose free diet  Small bland meals   Lifestyle /dietary modifications

## 2023-05-17 NOTE — PROGRESS NOTE ADULT - SUBJECTIVE AND OBJECTIVE BOX
Date of service: 23 @ 08:42    Lying in bed in NAD  Has cough with scant sputum  SOB with light exercise     ROS: no fever or chills; denies dizziness, no HA, no abdominal pain, no diarrhea or constipation; no dysuria, no legs pain, no rashes    MEDICATIONS  (STANDING):  acetylcysteine 10%  Inhalation 4 milliLiter(s) Inhalation three times a day  albuterol/ipratropium for Nebulization 3 milliLiter(s) Nebulizer every 6 hours  apixaban 5 milliGRAM(s) Oral two times a day  buDESOnide    Inhalation Suspension 0.5 milliGRAM(s) Inhalation every 12 hours  dextrose 5%. 1000 milliLiter(s) (50 mL/Hr) IV Continuous <Continuous>  dextrose 5%. 1000 milliLiter(s) (100 mL/Hr) IV Continuous <Continuous>  dextrose 50% Injectable 25 Gram(s) IV Push once  dextrose 50% Injectable 12.5 Gram(s) IV Push once  dextrose 50% Injectable 25 Gram(s) IV Push once  famotidine    Tablet 20 milliGRAM(s) Oral daily  fluconAZOLE   Tablet 100 milliGRAM(s) Oral daily  glucagon  Injectable 1 milliGRAM(s) IntraMuscular once  insulin glargine Injectable (LANTUS) 30 Unit(s) SubCutaneous at bedtime  insulin lispro (ADMELOG) corrective regimen sliding scale   SubCutaneous at bedtime  insulin lispro (ADMELOG) corrective regimen sliding scale   SubCutaneous three times a day before meals  magnesium oxide 400 milliGRAM(s) Oral two times a day with meals  meropenem  IVPB 1000 milliGRAM(s) IV Intermittent every 8 hours  mexiletine 200 milliGRAM(s) Oral every 8 hours  montelukast 10 milliGRAM(s) Oral at bedtime  nystatin    Suspension 060718 Unit(s) Oral every 6 hours  pantoprazole    Tablet 40 milliGRAM(s) Oral two times a day  polyethylene glycol 3350 17 Gram(s) Oral daily  predniSONE   Tablet 20 milliGRAM(s) Oral two times a day    Vital Signs Last 24 Hrs  T(C): 36.5 (17 May 2023 08:09), Max: 37.1 (16 May 2023 16:24)  T(F): 97.7 (17 May 2023 08:09), Max: 98.8 (16 May 2023 16:24)  HR: 68 (17 May 2023 08:09) (68 - 88)  BP: 124/76 (17 May 2023 08:09) (119/65 - 132/58)  BP(mean): --  RR: 17 (17 May 2023 08:09) (14 - 17)  SpO2: 100% (17 May 2023 08:09) (95% - 100%)    Parameters below as of 17 May 2023 08:09  Patient On (Oxygen Delivery Method): room air     Physical exam:    Constitutional:  No acute distress  HEENT: NC/AT, EOMI, PERRLA, conjunctivae clear; ears and nose atraumatic  Neck: supple; thyroid not palpable  Back: no tenderness  Respiratory: respiratory effort normal; few crackles at bases  Cardiovascular: S1S2 regular, no murmurs  Abdomen: soft, not tender, not distended, positive BS  Genitourinary: no suprapubic tenderness  Lymphatic: no LN palpable  Musculoskeletal: no muscle tenderness, no joint swelling or tenderness  Extremities: no pedal edema  Neurological/ Psychiatric: AxOx3, judgement and insight normal; moving all extremities  Skin: no rashes; no palpable lesions    Labs: reviewed                        11.9   12.53 )-----------( 197      ( 16 May 2023 06:23 )             40.1     05-16    140  |  107  |  24<H>  ----------------------------<  355<H>  5.0   |  29  |  0.71    Ca    8.7      16 May 2023 06:23  Phos  2.6     05-16  Mg     2.5     05-16                        11.8   8.07  )-----------( 181      ( 05 May 2023 06:21 )             42.0     05-05    137  |  107  |  12  ----------------------------<  248<H>  4.9   |  23  |  0.56    Ca    8.7      05 May 2023 06:21    TPro  7.6  /  Alb  3.2<L>  /  TBili  0.3  /  DBili  x   /  AST  27  /  ALT  21  /  AlkPhos  104  05-04     LIVER FUNCTIONS - ( 04 May 2023 09:51 )  Alb: 3.2 g/dL / Pro: 7.6 gm/dL / ALK PHOS: 104 U/L / ALT: 21 U/L / AST: 27 U/L / GGT: x           Urinalysis Basic - ( 04 May 2023 13:31 )    Color: Yellow / Appearance: Clear / S.015 / pH: x  Gluc: x / Ketone: Small  / Bili: Negative / Urobili: Negative   Blood: x / Protein: Negative / Nitrite: Negative   Leuk Esterase: Negative / RBC: 6-10 /HPF / WBC 3-5 /HPF   Sq Epi: x / Non Sq Epi: x / Bacteria: Occasional    Parainfluenza 3 ( @ 09:51)  Detected    Culture - Sputum (collected 13 May 2023 15:02)  Source: .Sputum Sputum  Gram Stain (14 May 2023 00:02):    Moderate polymorphonuclear leukocytes per low power field    No Squamous epithelial cells per low power field    Moderate Gram Negative Rods per oil power field  Final Report (15 May 2023 21:45):    Numerous Proteus mirabilis ESBL    Normal Respiratory Jessica present  Organism: Proteus mirabilis ESBL (15 May 2023 21:45)  Organism: Proteus mirabilis ESBL (15 May 2023 21:45)      Method Type: GARRETT      -  Amikacin: S <=16      -  Amoxicillin/Clavulanic Acid: S <=8/4      -  Ampicillin: R >16 These ampicillin results predict results for amoxicillin      -  Ampicillin/Sulbactam: R <=4/2 Enterobacter, Klebsiella aerogenes, Citrobacter, and Serratia may develop resistance during prolonged therapy (3-4 days)      -  Aztreonam: R >16      -  Cefazolin: R >16 Enterobacter, Klebsiella aerogenes, Citrobacter, and Serratia may develop resistance during prolonged therapy (3-4 days)      -  Cefepime: R >16      -  Ceftriaxone: R >32 Enterobacter, Klebsiella aerogenes, Citrobacter, and Serratia may develop resistance during prolonged therapy      -  Ciprofloxacin: R >2      -  Ertapenem: S <=0.5      -  Gentamicin: R >8      -  Levofloxacin: R >4      -  Meropenem: S <=1      -  Piperacillin/Tazobactam: R <=8      -  Tobramycin: R >8      -  Trimethoprim/Sulfamethoxazole: R >2/38        Radiology: all available radiological tests reviewed    < from: CT Chest No Cont (23 @ 13:36) >  Increased infectious or inflammatory bronchitis/bronchiolitis since 2023.  < end of copied text >        Advanced directives addressed: full resuscitation

## 2023-05-17 NOTE — CONSULT NOTE ADULT - SUBJECTIVE AND OBJECTIVE BOX
Patient is a 72y old  Female who presents with a chief complaint of fever     HPI:  71 y/o Female with h/o TIA, DVT, Colorectal CA s/p tx, Tracheobronchomalacia s/p tracheobronchoplasty , adrenal insufficiency on steroids, COPD/Asthma, Afib/AC, T2DM was admitted on  from NH for fever x 2 days. At the NH she had PICC line placed and started on meropenem empirically 2 days PTA, but continues to have fever and worsening productive cough and SOB. She endorses dry heaves and vomiting  last night, denies any diarrhea/abd pain. In ER, pt found to be hypotensive and received vancomycin IV.      PAST MEDICAL & SURGICAL HISTORY:  Atrial fibrillation paroxysmal, on eliquis  Diabetes Type 2  COPD (chronic obstructive pulmonary disease)  Adrenal insufficiency Medrol daily for over 50 years  Aortic insufficiency moderate AR on echo 5/3/2018  Pelvic fracture  Asthma  Colorectal cancer 2018- last treatment , chemo and radiation  Rectal bleeding  Seizure x 1 18  DVT (deep venous thrombosis) 15-20 years ago, took coumadin  TIA (transient ischemic attack) multiple, last 5 years ago - presents as right-sided weakness  History of partial hysterectomy 30 years ago - fibroids  H/O total knee replacement, bilateral 5 years ago  History of sinus surgery, multiple sinus surgeries  Exostosis of orbit, 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  H/O pelvic surgery 5 years ago - s/p fracture  History of tracheomalacia  - attempted tracheal stenting ([facility])- course complicated by obstruction, respiratory failure, multiple CPR attempts -  stent discontinued; 10/20/2016 Tracheobronchoplasty (Prolene Mesh) performed at [facility] by Dr Zapien, S/P bronchoscopy 2018 - [place] (Dr Zapien) no evidence of tracheobronchomalacia in trachea or bronchial tubes    Meds: per reconciliation sheet, noted below  MEDICATIONS  (STANDING):  albuterol/ipratropium for Nebulization 3 milliLiter(s) Nebulizer every 6 hours  apixaban 5 milliGRAM(s) Oral two times a day  buDESOnide    Inhalation Suspension 0.5 milliGRAM(s) Inhalation two times a day  dextrose 5%. 1000 milliLiter(s) (100 mL/Hr) IV Continuous <Continuous>  dextrose 5%. 1000 milliLiter(s) (50 mL/Hr) IV Continuous <Continuous>  dextrose 50% Injectable 25 Gram(s) IV Push once  dextrose 50% Injectable 12.5 Gram(s) IV Push once  dextrose 50% Injectable 25 Gram(s) IV Push once  glucagon  Injectable 1 milliGRAM(s) IntraMuscular once  insulin glargine Injectable (LANTUS) 25 Unit(s) SubCutaneous at bedtime  insulin lispro (ADMELOG) corrective regimen sliding scale   SubCutaneous three times a day before meals  insulin lispro (ADMELOG) corrective regimen sliding scale   SubCutaneous at bedtime  magnesium oxide 400 milliGRAM(s) Oral two times a day with meals  meropenem  IVPB 1000 milliGRAM(s) IV Intermittent every 8 hours  methylPREDNISolone sodium succinate Injectable 40 milliGRAM(s) IV Push two times a day  mexiletine 200 milliGRAM(s) Oral every 8 hours  nystatin    Suspension 441213 Unit(s) Oral every 8 hours  polyethylene glycol 3350 17 Gram(s) Oral daily  sodium chloride 0.9%. 1000 milliLiter(s) (125 mL/Hr) IV Continuous <Continuous>  vancomycin  IVPB 1250 milliGRAM(s) IV Intermittent every 12 hours    MEDICATIONS  (PRN):  acetaminophen     Tablet .. 650 milliGRAM(s) Oral every 6 hours PRN Temp greater or equal to 38C (100.4F), Mild Pain (1 - 3)  albuterol    90 MICROgram(s) HFA Inhaler 1 Puff(s) Inhalation four times a day PRN Shortness of Breath and/or Wheezing  aluminum hydroxide/magnesium hydroxide/simethicone Suspension 30 milliLiter(s) Oral every 4 hours PRN Dyspepsia  benzonatate 100 milliGRAM(s) Oral every 8 hours PRN Cough  dextrose Oral Gel 15 Gram(s) Oral once PRN Blood Glucose LESS THAN 70 milliGRAM(s)/deciliter  guaifenesin/dextromethorphan Oral Liquid 10 milliLiter(s) Oral every 4 hours PRN Cough  melatonin 3 milliGRAM(s) Oral at bedtime PRN Insomnia  ondansetron Injectable 4 milliGRAM(s) IV Push every 8 hours PRN Nausea and/or Vomiting  traMADol 50 milliGRAM(s) Oral every 8 hours PRN Moderate Pain (4 - 6)    Allergies    iodine (Short breath; Swelling)  Avelox (Short breath; Pruritus)  shellfish (Anaphylaxis)  aspirin (Short breath)  Dilaudid (Short breath)  codeine (Short breath)  cefepime (Anaphylaxis)  penicillin (Anaphylaxis)  tetanus toxoid (Short breath)  Valium (Short breath)    Intolerances      Social: no smoking, no alcohol, no illegal drugs; no recent travel, no exposure to TB  FAMILY HISTORY:  Family history of asthma  Family history of breast cancer (Sibling)  Family history of diabetes mellitus type II    ROS: the patient denies fever, no chills, no HA, no seizures, no dizziness, no sore throat, no nasal congestion, no blurry vision, no CP, no palpitations, has SOB, has cough, no abdominal pain, no diarrhea, had N/V, no dysuria, no leg pain, no claudication, no rash, no joint aches, no rectal pain or bleeding, no night sweats  All other systems reviewed and are negative    Vital Signs Last 24 Hrs  T(C): 36.4 (05 May 2023 08:08), Max: 38.3 (04 May 2023 10:33)  T(F): 97.5 (05 May 2023 08:08), Max: 101 (04 May 2023 10:33)  HR: 96 (05 May 2023 08:08) (87 - 101)  BP: 138/67 (05 May 2023 08:08) (90/61 - 150/73)  BP(mean): 70 (04 May 2023 15:23) (70 - 77)  RR: 18 (05 May 2023 08:08) (16 - 18)  SpO2: 100% (05 May 2023 08:08) (95% - 100%)    Parameters below as of 05 May 2023 08:08  Patient On (Oxygen Delivery Method): nasal cannula  O2 Flow (L/min): 2    Daily Height in cm: 170.18 (04 May 2023 09:38)    Daily     PE:    Constitutional:  No acute distress  HEENT: NC/AT, EOMI, PERRLA, conjunctivae clear; ears and nose atraumatic; pharynx benign  Neck: supple; thyroid not palpable  Back: no tenderness  Respiratory: respiratory effort normal; clear to auscultation  Cardiovascular: S1S2 regular, no murmurs  Abdomen: soft, not tender, not distended, positive BS; no liver or spleen organomegaly  Genitourinary: no suprapubic tenderness  Lymphatic: no LN palpable  Musculoskeletal: no muscle tenderness, no joint swelling or tenderness  Extremities: no pedal edema  Neurological/ Psychiatric: AxOx3, judgement and insight normal; moving all extremities  Skin: no rashes; no palpable lesions    Labs: all available labs reviewed                        11.8   8.07  )-----------( 181      ( 05 May 2023 06:21 )             42.0         137  |  107  |  12  ----------------------------<  248<H>  4.9   |  23  |  0.56    Ca    8.7      05 May 2023 06:21    TPro  7.6  /  Alb  3.2<L>  /  TBili  0.3  /  DBili  x   /  AST  27  /  ALT  21  /  AlkPhos  104       LIVER FUNCTIONS - ( 04 May 2023 09:51 )  Alb: 3.2 g/dL / Pro: 7.6 gm/dL / ALK PHOS: 104 U/L / ALT: 21 U/L / AST: 27 U/L / GGT: x           Urinalysis Basic - ( 04 May 2023 13:31 )    Color: Yellow / Appearance: Clear / S.015 / pH: x  Gluc: x / Ketone: Small  / Bili: Negative / Urobili: Negative   Blood: x / Protein: Negative / Nitrite: Negative   Leuk Esterase: Negative / RBC: 6-10 /HPF / WBC 3-5 /HPF   Sq Epi: x / Non Sq Epi: x / Bacteria: Occasional    Parainfluenza 3 ( @ 09:51)  Detected    Radiology: all available radiological tests reviewed    < from: CT Chest No Cont (23 @ 13:36) >  Increased infectious or inflammatory bronchitis/bronchiolitis since 2023.  < end of copied text >      Advanced directives addressed: full resuscitation
ICU Consult Note    HPI:    S:    Pt seen and examined  HD # 1  FULL CODE  PMHx of TIA, DVT, seizure, rectal bleeding, colorectal cancer, tracheobronchomalacia, asthma, pelvic fracture, aortic insufficieny, adrenal insufficieny, COPD, DM2, Afib, presents to the ED [facility]IBEMS from [facility] with daughter c/o lethargy x 2 days. As per EMS, patient has been feeling under the weather the last couple of days, was started on IV Meropenum on Tuesday night for "infection." As per daughter at bedside, pt also receives Vancomycin with Meropenum for infection, but wanted pt to receive Vancomycin in ED so came to [facility] for evaluation. Pt with fever in ED of 100.6 but steroid dependent.    ICU consult for hypotension    5/ PM: Pt lying in bed, awake and alert. c/o of SOB, malaise. w/u thus far consistent with PNA.    ROS: + SOB, malaise  Remainder of systems reviewed, negative    Allergies    iodine (Short breath; Swelling)  Avelox (Short breath; Pruritus)  shellfish (Anaphylaxis)  aspirin (Short breath)  Dilaudid (Short breath)  codeine (Short breath)  cefepime (Anaphylaxis)  penicillin (Anaphylaxis)  tetanus toxoid (Short breath)  Valium (Short breath)    Intolerances    MEDICATIONS  (STANDING):    apixaban 5 milliGRAM(s) Oral two times a day  buDESOnide    Inhalation Suspension 0.5 milliGRAM(s) Inhalation two times a day  dextrose 5%. 1000 milliLiter(s) (50 mL/Hr) IV Continuous <Continuous>  dextrose 5%. 1000 milliLiter(s) (100 mL/Hr) IV Continuous <Continuous>  dextrose 50% Injectable 25 Gram(s) IV Push once  dextrose 50% Injectable 12.5 Gram(s) IV Push once  dextrose 50% Injectable 25 Gram(s) IV Push once  glucagon  Injectable 1 milliGRAM(s) IntraMuscular once  insulin glargine Injectable (LANTUS) 25 Unit(s) SubCutaneous at bedtime  insulin lispro (ADMELOG) corrective regimen sliding scale   SubCutaneous three times a day before meals  insulin lispro (ADMELOG) corrective regimen sliding scale   SubCutaneous at bedtime  magnesium oxide 400 milliGRAM(s) Oral two times a day with meals  meropenem  IVPB 1000 milliGRAM(s) IV Intermittent every 8 hours  mexiletine 200 milliGRAM(s) Oral every 8 hours  polyethylene glycol 3350 17 Gram(s) Oral daily  sodium chloride 0.9%. 1000 milliLiter(s) (125 mL/Hr) IV Continuous <Continuous>  vancomycin  IVPB 1250 milliGRAM(s) IV Intermittent every 12 hours    MEDICATIONS  (PRN):    acetaminophen     Tablet .. 650 milliGRAM(s) Oral every 6 hours PRN Temp greater or equal to 38C (100.4F), Mild Pain (1 - 3)  aluminum hydroxide/magnesium hydroxide/simethicone Suspension 30 milliLiter(s) Oral every 4 hours PRN Dyspepsia  benzonatate 100 milliGRAM(s) Oral every 8 hours PRN Cough  dextrose Oral Gel 15 Gram(s) Oral once PRN Blood Glucose LESS THAN 70 milliGRAM(s)/deciliter  guaifenesin/dextromethorphan Oral Liquid 10 milliLiter(s) Oral every 4 hours PRN Cough  melatonin 3 milliGRAM(s) Oral at bedtime PRN Insomnia  ondansetron Injectable 4 milliGRAM(s) IV Push every 8 hours PRN Nausea and/or Vomiting  traMADol 50 milliGRAM(s) Oral every 8 hours PRN Moderate Pain (4 - 6)    Drug Dosing Weight    Height (cm): 170.2 (04 May 2023 09:38)  Weight (kg): 59.4 (04 May 2023 09:38)  BMI (kg/m2): 20.5 (04 May 2023 09:38)  BSA (m2): 1.69 (04 May 2023 09:38)    PAST MEDICAL & SURGICAL HISTORY:    Atrial fibrillation  paroxysmal, on eliquis  Diabetes  Type 2  COPD (chronic obstructive pulmonary disease)  Adrenal insufficiency  Medrol daily for over 50 years  Aortic insufficiency  moderate AR on echo 5/3/2018  Pelvic fracture  Asthma  Tracheobronchomalacia  diagnosed , s/p bronchial thermoplasty  (Dr Zapien); recent bronchoscopy 2018 revealed no evidence of tracheobronchomalacia in trachea or bronchial tubes  Colorectal cancer  2018- last treatment , chemo and radiation  Rectal bleeding  Seizure  x 1 18  DVT (deep venous thrombosis)  15-20 years ago, took coumadin  TIA (transient ischemic attack)  multiple, last 5 years ago - presents as right-sided weakness  History of partial hysterectomy  30 years ago - fibroids  H/O total knee replacement, bilateral  5 years ago  History of sinus surgery  multiple sinus surgeries  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  H/O pelvic surgery  5 years ago - s/p fracture  History of tracheomalacia   - attempted tracheal stenting ([facility])- course complicated by obstruction, respiratory failure, multiple CPR attempts -  stent discontinued; 10/20/2016 Tracheobronchoplasty (Prolene Mesh) performed at [facility] by Dr Zapien  S/P bronchoscopy  2018 - [place] (Dr Zapien) no evidence of tracheobronchomalacia in trachea or bronchial tubes  Rectal bleeding  exam under anesthesia ([facility]) 2018    FAMILY HISTORY:    Family history of asthma    Family history of breast cancer (Sibling)    Family history of diabetes mellitus type II    ROS: See HPI; otherwise, all systems reviewed and negative.    O:    ICU Vital Signs Last 24 Hrs  T(C): 37.2 (04 May 2023 16:22), Max: 38.3 (04 May 2023 10:33)  T(F): 98.9 (04 May 2023 16:22), Max: 101 (04 May 2023 10:33)  HR: 88 (04 May 2023 16:22) (88 - 101)  BP: 99/56 (04 May 2023 16:22) (90/61 - 146/78)  BP(mean): 70 (04 May 2023 15:23) (70 - 77)  ABP: --  ABP(mean): --  RR: 18 (04 May 2023 16:22) (16 - 18)  SpO2: 97% (04 May 2023 16:22) (95% - 100%)    O2 Parameters below as of 04 May 2023 16:22  Patient On (Oxygen Delivery Method): nasal cannula  O2 Flow (L/min): 2    I&O's Detail    PE:    Elderly F lying in bed  No JVD trachea midline  S1S2+  Coarse BS B/L  Abd soft NTND  No leg swelling/edema noted  Awake and alert  Skin pink, well perfused  + LUE PICC    LABS:    CBC Full  -  ( 04 May 2023 09:51 )  WBC Count : 10.20 K/uL  RBC Count : 6.22 M/uL  Hemoglobin : 13.3 g/dL  Hematocrit : 45.7 %  Platelet Count - Automated : 193 K/uL  Mean Cell Volume : 73.5 fl  Mean Cell Hemoglobin : 21.4 pg  Mean Cell Hemoglobin Concentration : 29.1 gm/dL  Auto Neutrophil # : 7.53 K/uL  Auto Lymphocyte # : 1.57 K/uL  Auto Monocyte # : 0.93 K/uL  Auto Eosinophil # : 0.08 K/uL  Auto Basophil # : 0.02 K/uL  Auto Neutrophil % : 73.8 %  Auto Lymphocyte % : 15.4 %  Auto Monocyte % : 9.1 %  Auto Eosinophil % : 0.8 %  Auto Basophil % : 0.2 %        136  |  100  |  12  ----------------------------<  135<H>  3.7   |  30  |  0.65    Ca    8.9      04 May 2023 09:51    TPro  7.6  /  Alb  3.2<L>  /  TBili  0.3  /  DBili  x   /  AST  27  /  ALT  21  /  AlkPhos  104  05-04    Urinalysis Basic - ( 04 May 2023 13:31 )    Color: Yellow / Appearance: Clear / S.015 / pH: x  Gluc: x / Ketone: Small  / Bili: Negative / Urobili: Negative   Blood: x / Protein: Negative / Nitrite: Negative   Leuk Esterase: Negative / RBC: 6-10 /HPF / WBC 3-5 /HPF   Sq Epi: x / Non Sq Epi: x / Bacteria: Occasional    CAPILLARY BLOOD GLUCOSE    LIVER FUNCTIONS - ( 04 May 2023 09:51 )    Alb: 3.2 g/dL / Pro: 7.6 gm/dL / ALK PHOS: 104 U/L / ALT: 21 U/L / AST: 27 U/L / GGT: x               
HPI:  Excessive burping  Acid Reflux controlled          PAST MEDICAL & SURGICAL HISTORY:  Atrial fibrillation paroxysmal, on eliquis  Diabetes Type 2  COPD (chronic obstructive pulmonary disease)  Adrenal insufficiency Medrol daily for over 50 years  Aortic insufficiency moderate AR on echo 5/3/2018  Pelvic fracture  Asthma  Colorectal cancer 2018- last treatment , chemo and radiation  Rectal bleeding  Seizure x 1 18  DVT (deep venous thrombosis) 15-20 years ago, took coumadin  TIA (transient ischemic attack) multiple, last 5 years ago - presents as right-sided weakness  History of partial hysterectomy 30 years ago - fibroids  H/O total knee replacement, bilateral 5 years ago  History of sinus surgery, multiple sinus surgeries  Exostosis of orbit, 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  H/O pelvic surgery 5 years ago - s/p fracture  History of tracheomalacia  - attempted tracheal stenting ([facility])- course complicated by obstruction, respiratory failure, multiple CPR attempts -  stent discontinued; 10/20/2016 Tracheobronchoplasty (Prolene Mesh) performed at [facility] by Dr Zapien, S/P bronchoscopy 2018 - [place] (Dr Zapien) no evidence of tracheobronchomalacia in trachea or bronchial tubes      Social History:   Currently in [facility]  needs assistance to ambulate  denies smoking/etoh    FAMILY HISTORY:  Family history of asthma  Family history of breast cancer (Sibling)  Family history of diabetes mellitus type II        MEDICATIONS  (STANDING):  albuterol/ipratropium for Nebulization 3 milliLiter(s) Nebulizer every 6 hours  apixaban 5 milliGRAM(s) Oral two times a day  buDESOnide    Inhalation Suspension 0.5 milliGRAM(s) Inhalation two times a day  dextrose 5%. 1000 milliLiter(s) (50 mL/Hr) IV Continuous <Continuous>  dextrose 5%. 1000 milliLiter(s) (100 mL/Hr) IV Continuous <Continuous>  dextrose 50% Injectable 25 Gram(s) IV Push once  dextrose 50% Injectable 12.5 Gram(s) IV Push once  dextrose 50% Injectable 25 Gram(s) IV Push once  glucagon  Injectable 1 milliGRAM(s) IntraMuscular once  insulin glargine Injectable (LANTUS) 25 Unit(s) SubCutaneous at bedtime  insulin lispro (ADMELOG) corrective regimen sliding scale   SubCutaneous three times a day before meals  insulin lispro (ADMELOG) corrective regimen sliding scale   SubCutaneous at bedtime  magnesium oxide 400 milliGRAM(s) Oral two times a day with meals  meropenem  IVPB 1000 milliGRAM(s) IV Intermittent every 8 hours  methylPREDNISolone sodium succinate Injectable 40 milliGRAM(s) IV Push two times a day  mexiletine 200 milliGRAM(s) Oral every 8 hours  nystatin    Suspension 444314 Unit(s) Oral every 8 hours  polyethylene glycol 3350 17 Gram(s) Oral daily  sodium chloride 0.9%. 1000 milliLiter(s) (125 mL/Hr) IV Continuous <Continuous>  vancomycin  IVPB 1250 milliGRAM(s) IV Intermittent every 12 hours    MEDICATIONS  (PRN):  acetaminophen     Tablet .. 650 milliGRAM(s) Oral every 6 hours PRN Temp greater or equal to 38C (100.4F), Mild Pain (1 - 3)  albuterol    90 MICROgram(s) HFA Inhaler 1 Puff(s) Inhalation four times a day PRN Shortness of Breath and/or Wheezing  aluminum hydroxide/magnesium hydroxide/simethicone Suspension 30 milliLiter(s) Oral every 4 hours PRN Dyspepsia  benzonatate 100 milliGRAM(s) Oral every 8 hours PRN Cough  dextrose Oral Gel 15 Gram(s) Oral once PRN Blood Glucose LESS THAN 70 milliGRAM(s)/deciliter  guaifenesin/dextromethorphan Oral Liquid 10 milliLiter(s) Oral every 4 hours PRN Cough  melatonin 3 milliGRAM(s) Oral at bedtime PRN Insomnia  ondansetron Injectable 4 milliGRAM(s) IV Push every 8 hours PRN Nausea and/or Vomiting  traMADol 50 milliGRAM(s) Oral every 8 hours PRN Moderate Pain (4 - 6)      CAPILLARY BLOOD GLUCOSE  POCT Blood Glucose.: 95 mg/dL (04 May 2023 16:46)        LABS:                        13.3   10.20 )-----------( 193      ( 04 May 2023 09:51 )             45.7     05-04    136  |  100  |  12  ----------------------------<  135<H>  3.7   |  30  |  0.65    Ca    8.9      04 May 2023 09:51    TPro  7.6  /  Alb  3.2<L>  /  TBili  0.3  /  DBili  x   /  AST  27  /  ALT  21  /  AlkPhos  104  05-04          Urinalysis Basic - ( 04 May 2023 13:31 )    Color: Yellow / Appearance: Clear / S.015 / pH: x  Gluc: x / Ketone: Small  / Bili: Negative / Urobili: Negative   Blood: x / Protein: Negative / Nitrite: Negative   Leuk Esterase: Negative / RBC: 6-10 /HPF / WBC 3-5 /HPF   Sq Epi: x / Non Sq Epi: x / Bacteria: Occasional        RADIOLOGY & ADDITIONAL TESTS:    Imaging Personally Reviewed:  CT chest: Increased infectious or inflammatory bronchitis/bronchiolitis since 2023.      EKG Personally Reviewed:    Consultant(s) Notes Reviewed:      Care Discussed with Consultants/Other Providers: ICU PA   (04 May 2023 16:06)      PAST MEDICAL & SURGICAL HISTORY:  Atrial fibrillation  paroxysmal, on eliquis      Diabetes  Type 2      COPD (chronic obstructive pulmonary disease)      Adrenal insufficiency  Medrol daily for over 50 years      Aortic insufficiency  moderate AR on echo 5/3/2018      Pelvic fracture      Asthma      Tracheobronchomalacia  diagnosed , s/p bronchial thermoplasty  (Dr Zapien); recent bronchoscopy 2018 revealed no evidence of tracheobronchomalacia in trachea or bronchial tubes      Colorectal cancer  2018- last treatment , chemo and radiation      Rectal bleeding      Seizure  x 1 18      DVT (deep venous thrombosis)  15-20 years ago, took coumadin      TIA (transient ischemic attack)  multiple, last 5 years ago - presents as right-sided weakness      History of partial hysterectomy  30 years ago - fibroids      H/O total knee replacement, bilateral  5 years ago      History of sinus surgery  multiple sinus surgeries      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      H/O pelvic surgery  5 years ago - s/p fracture      History of tracheomalacia   - attempted tracheal stenting ([facility])- course complicated by obstruction, respiratory failure, multiple CPR attempts -  stent discontinued; 10/20/2016 Tracheobronchoplasty (Prolene Mesh) performed at [facility] by Dr Zapien      S/P bronchoscopy  2018 - [place] (Dr Zapien) no evidence of tracheobronchomalacia in trachea or bronchial tubes      Rectal bleeding  exam under anesthesia ([facility]) 2018          MEDICATIONS  (STANDING):  acetylcysteine 10%  Inhalation 4 milliLiter(s) Inhalation three times a day  albuterol/ipratropium for Nebulization 3 milliLiter(s) Nebulizer every 6 hours  apixaban 5 milliGRAM(s) Oral two times a day  buDESOnide    Inhalation Suspension 0.5 milliGRAM(s) Inhalation every 12 hours  dextrose 5%. 1000 milliLiter(s) (50 mL/Hr) IV Continuous <Continuous>  dextrose 5%. 1000 milliLiter(s) (100 mL/Hr) IV Continuous <Continuous>  dextrose 50% Injectable 25 Gram(s) IV Push once  dextrose 50% Injectable 12.5 Gram(s) IV Push once  dextrose 50% Injectable 25 Gram(s) IV Push once  famotidine    Tablet 20 milliGRAM(s) Oral daily  fluconAZOLE   Tablet 100 milliGRAM(s) Oral daily  glucagon  Injectable 1 milliGRAM(s) IntraMuscular once  insulin glargine Injectable (LANTUS) 30 Unit(s) SubCutaneous at bedtime  insulin lispro (ADMELOG) corrective regimen sliding scale   SubCutaneous three times a day before meals  insulin lispro (ADMELOG) corrective regimen sliding scale   SubCutaneous at bedtime  magnesium oxide 400 milliGRAM(s) Oral two times a day with meals  meropenem  IVPB 1000 milliGRAM(s) IV Intermittent every 8 hours  mexiletine 200 milliGRAM(s) Oral every 8 hours  montelukast 10 milliGRAM(s) Oral at bedtime  nystatin    Suspension 198237 Unit(s) Oral every 6 hours  pantoprazole    Tablet 40 milliGRAM(s) Oral two times a day  polyethylene glycol 3350 17 Gram(s) Oral daily  predniSONE   Tablet 20 milliGRAM(s) Oral two times a day    MEDICATIONS  (PRN):  acetaminophen     Tablet .. 650 milliGRAM(s) Oral every 6 hours PRN Temp greater or equal to 38C (100.4F), Mild Pain (1 - 3)  albuterol    90 MICROgram(s) HFA Inhaler 2 Puff(s) Inhalation every 4 hours PRN Shortness of Breath  aluminum hydroxide/magnesium hydroxide/simethicone Suspension 30 milliLiter(s) Oral every 4 hours PRN Dyspepsia  dextrose Oral Gel 15 Gram(s) Oral once PRN Blood Glucose LESS THAN 70 milliGRAM(s)/deciliter  guaifenesin/dextromethorphan Oral Liquid 10 milliLiter(s) Oral every 4 hours PRN Cough  melatonin 3 milliGRAM(s) Oral at bedtime PRN Insomnia  ondansetron Injectable 4 milliGRAM(s) IV Push every 8 hours PRN Nausea and/or Vomiting  simethicone 80 milliGRAM(s) Chew three times a day PRN Gas      Allergies    iodine (Short breath; Swelling)  Avelox (Short breath; Pruritus)  shellfish (Anaphylaxis)  aspirin (Short breath)  Dilaudid (Short breath)  codeine (Short breath)  cefepime (Anaphylaxis)  penicillin (Anaphylaxis)  tetanus toxoid (Short breath)  Valium (Short breath)    Intolerances        SOCIAL HISTORY:    FAMILY HISTORY:  Family history of asthma    Family history of breast cancer (Sibling)    Family history of diabetes mellitus type II     Non-contributory    REVIEW OF SYSTEMS      General:	    Respiratory and Thorax:  	  Cardiovascular:	    Gastrointestinal:	    Musculoskeletal:	   Vital Signs Last 24 Hrs  T(C): 36.5 (17 May 2023 08:09), Max: 37.1 (16 May 2023 16:24)  T(F): 97.7 (17 May 2023 08:09), Max: 98.8 (16 May 2023 16:24)  HR: 68 (17 May 2023 08:09) (68 - 88)  BP: 124/76 (17 May 2023 08:09) (119/65 - 132/58)  BP(mean): --  RR: 17 (17 May 2023 08:09) (14 - 17)  SpO2: 100% (17 May 2023 08:09) (95% - 100%)    Parameters below as of 17 May 2023 08:09  Patient On (Oxygen Delivery Method): room air        HEENT :No Pallor.No icterus. EOMI,PERLAA  Chest : Clear to Auscultation  CVS : S1S2 Normal.No murmurs.  Abdomen: Soft.Non tender .Normal bowel sounds.No Organomegaly.  CNS: Alert.Oriented to Time,Place and Person.No focal deficit.  LABS:                        11.9   12.53 )-----------( 197      ( 16 May 2023 06:23 )             40.1         140  |  107  |  24<H>  ----------------------------<  355<H>  5.0   |  29  |  0.71    Ca    8.7      16 May 2023 06:23  Phos  2.6     05-16  Mg     2.5     05-16            RADIOLOGY & ADDITIONAL STUDIES:
  HPI:    72F w/PMH TIA, DVT, Colorectal CA s/p tx, Tracheobronchomalacia s/p Tracheobronchoplasty  (no further evidence), adrenal insufficiency on steroids, COPD/Asthma, Afib/AC, T2DM, recent admit 2 mos ago,  presents from NH for fever x2 days.  She had Picc line placed and started on meropenem empirically 2 days ago, but continues to have fever and worsening productive cough and sob. Pt is AAOx3 and gives her own hx.  She endorses dry heaves and vomiting  last night, denies any diarrhea/abd pain.  At time of my exam, pt found to be hypotensive on repeat bp checks.      In ED, MD d/w ID, rec to remove picc line (to be done by ED), given vanco iV, 1L ivf, duoneb, +febrile, CT chest: Increased infectious or inflammatory bronchitis/bronchiolitis since   2023.    The patient has a complex pulmonary history including severe steroid dependent asthma, bronchiectasis and tracheobronchomalacia as documented above. She has a chronic daily cough, productive of sputum. She wears  Smart Vest at home to facilitate removal of secretions. She is also on mucolytics, as well as aerosols. Now admitted for aggressive Tx of her asthma and resp. failure.      PAST MEDICAL & SURGICAL HISTORY:  Atrial fibrillation  paroxysmal, on eliquis      Diabetes  Type 2      COPD (chronic obstructive pulmonary disease)      Adrenal insufficiency  Medrol daily for over 50 years      Aortic insufficiency  moderate AR on echo 5/3/2018      Pelvic fracture      Asthma      Tracheobronchomalacia  diagnosed , s/p bronchial thermoplasty  (Dr Zapien); recent bronchoscopy 2018 revealed no evidence of tracheobronchomalacia in trachea or bronchial tubes      Colorectal cancer  2018- last treatment , chemo and radiation      Rectal bleeding      Seizure  x 1 18      DVT (deep venous thrombosis)  15-20 years ago, took coumadin      TIA (transient ischemic attack)  multiple, last 5 years ago - presents as right-sided weakness      History of partial hysterectomy  30 years ago - fibroids      H/O total knee replacement, bilateral  5 years ago      History of sinus surgery  multiple sinus surgeries      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      H/O pelvic surgery  5 years ago - s/p fracture      History of tracheomalacia   - attempted tracheal stenting ([facility])- course complicated by obstruction, respiratory failure, multiple CPR attempts -  stent discontinued; 10/20/2016 Tracheobronchoplasty (Prolene Mesh) performed at [facility] by Dr Zapien      S/P bronchoscopy  2018 - [place] (Dr Zapien) no evidence of tracheobronchomalacia in trachea or bronchial tubes      Rectal bleeding  exam under anesthesia ([facility]) 2018          MEDICATIONS  (STANDING):  acetylcysteine 10%  Inhalation 4 milliLiter(s) Inhalation three times a day  albuterol/ipratropium for Nebulization 3 milliLiter(s) Nebulizer every 6 hours  albuterol/ipratropium for Nebulization 3 milliLiter(s) Nebulizer every 6 hours  apixaban 5 milliGRAM(s) Oral two times a day  buDESOnide    Inhalation Suspension 0.5 milliGRAM(s) Inhalation every 12 hours  dextrose 5%. 1000 milliLiter(s) (100 mL/Hr) IV Continuous <Continuous>  dextrose 5%. 1000 milliLiter(s) (50 mL/Hr) IV Continuous <Continuous>  dextrose 50% Injectable 25 Gram(s) IV Push once  dextrose 50% Injectable 12.5 Gram(s) IV Push once  dextrose 50% Injectable 25 Gram(s) IV Push once  glucagon  Injectable 1 milliGRAM(s) IntraMuscular once  insulin glargine Injectable (LANTUS) 25 Unit(s) SubCutaneous at bedtime  insulin lispro (ADMELOG) corrective regimen sliding scale   SubCutaneous three times a day before meals  insulin lispro (ADMELOG) corrective regimen sliding scale   SubCutaneous at bedtime  magnesium oxide 400 milliGRAM(s) Oral two times a day with meals  meropenem  IVPB 1000 milliGRAM(s) IV Intermittent every 8 hours  methylPREDNISolone sodium succinate Injectable 60 milliGRAM(s) IV Push every 6 hours  mexiletine 200 milliGRAM(s) Oral every 8 hours  montelukast 10 milliGRAM(s) Oral at bedtime  nystatin    Suspension 104433 Unit(s) Oral every 8 hours  polyethylene glycol 3350 17 Gram(s) Oral daily  sodium chloride 0.9%. 1000 milliLiter(s) (125 mL/Hr) IV Continuous <Continuous>  vancomycin  IVPB 1250 milliGRAM(s) IV Intermittent every 12 hours    MEDICATIONS  (PRN):  acetaminophen     Tablet .. 650 milliGRAM(s) Oral every 6 hours PRN Temp greater or equal to 38C (100.4F), Mild Pain (1 - 3)  albuterol    90 MICROgram(s) HFA Inhaler 1 Puff(s) Inhalation four times a day PRN Shortness of Breath and/or Wheezing  albuterol    90 MICROgram(s) HFA Inhaler 2 Puff(s) Inhalation every 4 hours PRN Shortness of Breath  aluminum hydroxide/magnesium hydroxide/simethicone Suspension 30 milliLiter(s) Oral every 4 hours PRN Dyspepsia  benzonatate 100 milliGRAM(s) Oral every 8 hours PRN Cough  dextrose Oral Gel 15 Gram(s) Oral once PRN Blood Glucose LESS THAN 70 milliGRAM(s)/deciliter  guaifenesin/dextromethorphan Oral Liquid 10 milliLiter(s) Oral every 4 hours PRN Cough  melatonin 3 milliGRAM(s) Oral at bedtime PRN Insomnia  ondansetron Injectable 4 milliGRAM(s) IV Push every 8 hours PRN Nausea and/or Vomiting  traMADol 50 milliGRAM(s) Oral every 8 hours PRN Moderate Pain (4 - 6)      Allergies    iodine (Short breath; Swelling)  Avelox (Short breath; Pruritus)  shellfish (Anaphylaxis)  aspirin (Short breath)  Dilaudid (Short breath)  codeine (Short breath)  cefepime (Anaphylaxis)  penicillin (Anaphylaxis)  tetanus toxoid (Short breath)  Valium (Short breath)    Intolerances        SOCIAL HISTORY: Denies tobacco, etoh abuse or illicit drug use    FAMILY HISTORY:  Family history of asthma    Family history of breast cancer (Sibling)    Family history of diabetes mellitus type II        Vital Signs Last 24 Hrs  T(C): 36.4 (05 May 2023 08:08), Max: 38.3 (04 May 2023 10:33)  T(F): 97.5 (05 May 2023 08:08), Max: 101 (04 May 2023 10:33)  HR: 96 (05 May 2023 08:08) (87 - 100)  BP: 138/67 (05 May 2023 08:08) (90/61 - 150/73)  BP(mean): 70 (04 May 2023 15:23) (70 - 77)  RR: 18 (05 May 2023 08:08) (16 - 18)  SpO2: 100% (05 May 2023 08:08) (95% - 100%)    Parameters below as of 05 May 2023 08:08  Patient On (Oxygen Delivery Method): nasal cannula  O2 Flow (L/min): 2      REVIEW OF SYSTEMS:    CONSTITUTIONAL:  As per HPI.  HEENT:  Eyes:  No diplopia or blurred vision. ENT:  No earache, sore throat or runny nose.  CARDIOVASCULAR:  No pressure, squeezing, tightness, heaviness or aching about the chest, neck, axilla or epigastrium.  RESPIRATORY:  See above  GASTROINTESTINAL:  No nausea, vomiting or diarrhea.  GENITOURINARY:  No dysuria, frequency or urgency.  MUSCULOSKELETAL:  As per HPI.  SKIN:  No change in skin, hair or nails.  NEUROLOGIC:  No paresthesias, fasciculations, seizures or weakness.  PSYCHIATRIC:  No disorder of thought or mood.  ENDOCRINE:  No heat or cold intolerance, polyuria or polydipsia.  HEMATOLOGICAL:  No easy bruising or bleedings:  .     PHYSICAL EXAMINATION:    GENERAL APPEARANCE:  Pt. is not currently dyspneic, in no distress. Pt. is alert, oriented, and pleasant.  HEENT:  Pupils are normal and react normally. No icterus. Mucous membranes well colored.  NECK:  Supple. No lymphadenopathy. Jugular venous pressure not elevated.   HEART:   The cardiac impulse has a normal quality. Regularly, irreg. Normal S1 and S2.  CHEST:  Chest with diffuse insp./exp. wheezes, scattered rhonchi. Normal respiratory effort.  ABDOMEN:  Soft and nontender.   EXTREMITIES:  There is no cyanosis, clubbing or edema.   SKIN:  No rash or significant lesions are noted.    LABS:                        11.8   8.07  )-----------( 181      ( 05 May 2023 06:21 )             42.0         137  |  107  |  12  ----------------------------<  248<H>  4.9   |  23  |  0.56    Ca    8.7      05 May 2023 06:21    TPro  7.6  /  Alb  3.2<L>  /  TBili  0.3  /  DBili  x   /  AST  27  /  ALT  21  /  AlkPhos  104  05-04    LIVER FUNCTIONS - ( 04 May 2023 09:51 )  Alb: 3.2 g/dL / Pro: 7.6 gm/dL / ALK PHOS: 104 U/L / ALT: 21 U/L / AST: 27 U/L / GGT: x                 Urinalysis Basic - ( 04 May 2023 13:31 )    Color: Yellow / Appearance: Clear / S.015 / pH: x  Gluc: x / Ketone: Small  / Bili: Negative / Urobili: Negative   Blood: x / Protein: Negative / Nitrite: Negative   Leuk Esterase: Negative / RBC: 6-10 /HPF / WBC 3-5 /HPF   Sq Epi: x / Non Sq Epi: x / Bacteria: Occasional          RADIOLOGY & ADDITIONAL STUDIES: < from: CT Chest No Cont (23 @ 13:36) >  ACC: [id number] EXAM:  CT CHEST   ORDERED BY: VERONA JUNIOR     PROCEDURE DATE:  2023          INTERPRETATION:  INDICATION: Cough    TECHNIQUE: Volumetric images of the chest without intravenous contrast.   Maximum intensity projection images were generated.    COMPARISON: CT chest 2023.    FINDINGS:    LUNGS/AIRWAYS/PLEURA: Increased tree-in-bud in both lungs, preferential   to the right lower lobe. Several mucous impacted subsegmental bronchi and   new airway wall thickening in the right lower lobe. New lingular opacity   along the oblique fissure, likely atelectasis. Dendriform pulmonary   ossification in the right lower lobe. Upper lobe predominant paraseptal   emphysema. Unchanged atelectasis or scarring in the lower lobes and   middle lobe. No pleural effusion.    LYMPH NODES/MEDIASTINUM: Unremarkable.    HEART/VASCULATURE: Aortic valve and a ascending aortic replacement.   Normal heart size. No pericardial effusion. Unchanged probable calcified   fibrin sheath in the right brachiocephalic vein. Unchanged displaced   intimal calcification in the descending aorta compatible with a   dissection.    UPPER ABDOMEN: Unchanged pancreatic tail cystic lesions and partially   calcified indeterminate left renal lesion.    BONES/SOFT TISSUES: Sternotomy.      IMPRESSION:    Increased infectious or inflammatory bronchitis/bronchiolitis since   2023.      ROSALINDA AMES M.D.

## 2023-05-17 NOTE — PROGRESS NOTE ADULT - SUBJECTIVE AND OBJECTIVE BOX
HOSPITALIST PROGRESS NOTE:  SUBJECTIVE:  PCP:  Chief Complaint: Patient is a 72y old  Female who presents with a chief complaint of fever (15 May 2023 10:15)      HPI:  HPI:  72F w/PMH TIA, DVT, Colorectal CA s/p tx, Tracheobronchomalacia s/p Tracheobronchoplasty 2016 (no further evidence), adrenal insufficiency on steroids, COPD/Asthma, Afib/AC, T2DM, recent admit 2 mos ago,  presents from NH for fever x2 days.  She had Picc line placed and started on meropenem empirically 2 days ago, but continues to have fever and worsening productive cough and sob. Pt is AAOx3 and gives her own hx.  She endorses dry heaves and vomiting  last night, denies any diarrhea/abd pain.  At time of my exam, pt found to be hypotensive on repeat bp checks.      In ED, MD d/w ID, rec to remove picc line (to be done by ED), given vanco iV, 1L ivf, duoneb, +febrile, CT chest: Increased infectious or inflammatory bronchitis/bronchiolitis since   4/5/2023.    5/15: Patient has no complaints. NO dyspnea; Sputum cutlures came back proteus miraboilis. patient continues to have belching; improved with mylicon; No abd pain, CP  5/16:  Patient upset that GI hasnt seen her for th acid reflux; sputum cx came back for proteus and ESBL; No other complaints   5/17: SUgars have been uncontroled; Cough improving; No other issues     Allergies:  iodine (Short breath; Swelling)  Avelox (Short breath; Pruritus)  shellfish (Anaphylaxis)  aspirin (Short breath)  Dilaudid (Short breath)  codeine (Short breath)  cefepime (Anaphylaxis)  penicillin (Anaphylaxis)  tetanus toxoid (Short breath)  Valium (Short breath)    REVIEW OF SYSTEMS:  See HPI. All other review of systems is negative unless indicated above.     OBJECTIVE  Physical Exam:  Vital Signs Last 24 Hrs  T(C): 36.2 (17 May 2023 15:50), Max: 36.5 (16 May 2023 23:47)  T(F): 97.1 (17 May 2023 15:50), Max: 97.7 (16 May 2023 23:47)  HR: 75 (17 May 2023 15:50) (68 - 88)  BP: 140/68 (17 May 2023 15:50) (124/76 - 140/68)  BP(mean): --  RR: 18 (17 May 2023 15:50) (17 - 18)  SpO2: 100% (17 May 2023 15:50) (98% - 100%)    Parameters below as of 17 May 2023 15:50  Patient On (Oxygen Delivery Method): room air      Constitutional: NAD, awake and alert  Neurological: AAO x 3, no focal deficits  HEENT: PERRLA, EOMI, MMM  Neck: Soft and supple, No LAD, No JVD  Respiratory: Breath sounds are clear bilaterally, No wheezing, rales or rhonchi  Cardiovascular: S1 and S2, regular rate and rhythm; no Murmurs, gallops or rubs  Gastrointestinal: Bowel Sounds present, soft, nontender, nondistended, no guarding, no rebound tenderness  Back: No CVA tenderness   Extremities: No peripheral edema  Vascular: 2+ peripheral pulses  Musculoskeletal: 5/5 strength b/l upper and lower extremities  Skin: No rashes  Breast: Deferred  Rectal: Deferred    MEDICATIONS  (STANDING):  acetylcysteine 10%  Inhalation 4 milliLiter(s) Inhalation three times a day  albuterol/ipratropium for Nebulization 3 milliLiter(s) Nebulizer every 6 hours  apixaban 5 milliGRAM(s) Oral two times a day  buDESOnide    Inhalation Suspension 0.5 milliGRAM(s) Inhalation every 12 hours  dextrose 5%. 1000 milliLiter(s) (100 mL/Hr) IV Continuous <Continuous>  dextrose 5%. 1000 milliLiter(s) (50 mL/Hr) IV Continuous <Continuous>  dextrose 50% Injectable 25 Gram(s) IV Push once  dextrose 50% Injectable 12.5 Gram(s) IV Push once  dextrose 50% Injectable 25 Gram(s) IV Push once  famotidine    Tablet 20 milliGRAM(s) Oral daily  fluconAZOLE   Tablet 100 milliGRAM(s) Oral daily  glucagon  Injectable 1 milliGRAM(s) IntraMuscular once  insulin glargine Injectable (LANTUS) 30 Unit(s) SubCutaneous at bedtime  insulin lispro (ADMELOG) corrective regimen sliding scale   SubCutaneous at bedtime  insulin lispro (ADMELOG) corrective regimen sliding scale   SubCutaneous three times a day before meals  magnesium oxide 400 milliGRAM(s) Oral two times a day with meals  mexiletine 200 milliGRAM(s) Oral every 8 hours  montelukast 10 milliGRAM(s) Oral at bedtime  nystatin    Suspension 796212 Unit(s) Oral every 6 hours  pantoprazole    Tablet 40 milliGRAM(s) Oral before breakfast  polyethylene glycol 3350 17 Gram(s) Oral daily  predniSONE   Tablet 20 milliGRAM(s) Oral two times a day      MICROBIOLOGY/CULTURES:  Culture Results:   Moderate Proteus mirabilis  Normal Respiratory Jessica present (05-13 @ 15:02)    Lab Results:  CBC  CBC Full  -  ( 16 May 2023 06:23 )  WBC Count : 12.53 K/uL  RBC Count : 5.48 M/uL  Hemoglobin : 11.9 g/dL  Hematocrit : 40.1 %  Platelet Count - Automated : 197 K/uL  Mean Cell Volume : 73.2 fl  Mean Cell Hemoglobin : 21.7 pg  Mean Cell Hemoglobin Concentration : 29.7 gm/dL  Auto Neutrophil # : x  Auto Lymphocyte # : x  Auto Monocyte # : x  Auto Eosinophil # : x  Auto Basophil # : x  Auto Neutrophil % : x  Auto Lymphocyte % : x  Auto Monocyte % : x  Auto Eosinophil % : x  Auto Basophil % : x    .		Differential:	[] Automated		[] Manual  Chemistry                        11.9   12.53 )-----------( 197      ( 16 May 2023 06:23 )             40.1     05-16    140  |  107  |  24<H>  ----------------------------<  355<H>  5.0   |  29  |  0.71    Ca    8.7      16 May 2023 06:23  Phos  2.6     05-16  Mg     2.5     05-16        MICROBIOLOGY/CULTURES:  Culture Results:   Numerous Proteus mirabilis ESBL  Normal Respiratory Jessica present (05-13 @ 15:02)      RADIOLOGY/EKG:      < from: Xray Chest 1 View- PORTABLE-Urgent (Xray Chest 1 View- PORTABLE-Urgent .) (05.04.23 @ 10:47) >    IMPRESSION: Persistent small density right base laterally.    < end of copied text >  < from: CT Chest No Cont (05.04.23 @ 13:36) >    IMPRESSION:    Increased infectious or inflammatory bronchitis/bronchiolitis since   4/5/2023.    < end of copied text >

## 2023-05-18 LAB
ANION GAP SERPL CALC-SCNC: 4 MMOL/L — LOW (ref 5–17)
BUN SERPL-MCNC: 25 MG/DL — HIGH (ref 7–23)
CALCIUM SERPL-MCNC: 8.3 MG/DL — LOW (ref 8.5–10.1)
CHLORIDE SERPL-SCNC: 109 MMOL/L — HIGH (ref 96–108)
CO2 SERPL-SCNC: 27 MMOL/L — SIGNIFICANT CHANGE UP (ref 22–31)
CREAT SERPL-MCNC: 0.75 MG/DL — SIGNIFICANT CHANGE UP (ref 0.5–1.3)
EGFR: 83 ML/MIN/1.73M2 — SIGNIFICANT CHANGE UP
GLUCOSE BLDC GLUCOMTR-MCNC: 157 MG/DL — HIGH (ref 70–99)
GLUCOSE BLDC GLUCOMTR-MCNC: 281 MG/DL — HIGH (ref 70–99)
GLUCOSE BLDC GLUCOMTR-MCNC: 293 MG/DL — HIGH (ref 70–99)
GLUCOSE BLDC GLUCOMTR-MCNC: 315 MG/DL — HIGH (ref 70–99)
GLUCOSE SERPL-MCNC: 178 MG/DL — HIGH (ref 70–99)
HCT VFR BLD CALC: 39.5 % — SIGNIFICANT CHANGE UP (ref 34.5–45)
HGB BLD-MCNC: 11.7 G/DL — SIGNIFICANT CHANGE UP (ref 11.5–15.5)
MAGNESIUM SERPL-MCNC: 2.3 MG/DL — SIGNIFICANT CHANGE UP (ref 1.6–2.6)
MCHC RBC-ENTMCNC: 21.6 PG — LOW (ref 27–34)
MCHC RBC-ENTMCNC: 29.6 GM/DL — LOW (ref 32–36)
MCV RBC AUTO: 72.9 FL — LOW (ref 80–100)
PHOSPHATE SERPL-MCNC: 3.3 MG/DL — SIGNIFICANT CHANGE UP (ref 2.5–4.5)
PLATELET # BLD AUTO: 186 K/UL — SIGNIFICANT CHANGE UP (ref 150–400)
POTASSIUM SERPL-MCNC: 4.5 MMOL/L — SIGNIFICANT CHANGE UP (ref 3.5–5.3)
POTASSIUM SERPL-SCNC: 4.5 MMOL/L — SIGNIFICANT CHANGE UP (ref 3.5–5.3)
RBC # BLD: 5.42 M/UL — HIGH (ref 3.8–5.2)
RBC # FLD: 24.2 % — HIGH (ref 10.3–14.5)
SODIUM SERPL-SCNC: 140 MMOL/L — SIGNIFICANT CHANGE UP (ref 135–145)
WBC # BLD: 18.8 K/UL — HIGH (ref 3.8–10.5)
WBC # FLD AUTO: 18.8 K/UL — HIGH (ref 3.8–10.5)

## 2023-05-18 PROCEDURE — 99232 SBSQ HOSP IP/OBS MODERATE 35: CPT

## 2023-05-18 RX ADMIN — POLYETHYLENE GLYCOL 3350 17 GRAM(S): 17 POWDER, FOR SOLUTION ORAL at 17:47

## 2023-05-18 RX ADMIN — Medication 6: at 13:27

## 2023-05-18 RX ADMIN — Medication 4 MILLILITER(S): at 08:54

## 2023-05-18 RX ADMIN — MEROPENEM 100 MILLIGRAM(S): 1 INJECTION INTRAVENOUS at 21:50

## 2023-05-18 RX ADMIN — APIXABAN 5 MILLIGRAM(S): 2.5 TABLET, FILM COATED ORAL at 11:51

## 2023-05-18 RX ADMIN — MEXILETINE HYDROCHLORIDE 200 MILLIGRAM(S): 150 CAPSULE ORAL at 06:11

## 2023-05-18 RX ADMIN — Medication 20 MILLIGRAM(S): at 21:51

## 2023-05-18 RX ADMIN — MEROPENEM 100 MILLIGRAM(S): 1 INJECTION INTRAVENOUS at 15:13

## 2023-05-18 RX ADMIN — Medication 3 MILLILITER(S): at 08:53

## 2023-05-18 RX ADMIN — Medication 3 MILLILITER(S): at 20:57

## 2023-05-18 RX ADMIN — Medication 4 MILLILITER(S): at 13:59

## 2023-05-18 RX ADMIN — MEXILETINE HYDROCHLORIDE 200 MILLIGRAM(S): 150 CAPSULE ORAL at 21:50

## 2023-05-18 RX ADMIN — Medication 2: at 09:34

## 2023-05-18 RX ADMIN — MEROPENEM 100 MILLIGRAM(S): 1 INJECTION INTRAVENOUS at 06:10

## 2023-05-18 RX ADMIN — Medication 4 MILLILITER(S): at 20:57

## 2023-05-18 RX ADMIN — INSULIN GLARGINE 40 UNIT(S): 100 INJECTION, SOLUTION SUBCUTANEOUS at 21:51

## 2023-05-18 RX ADMIN — MONTELUKAST 10 MILLIGRAM(S): 4 TABLET, CHEWABLE ORAL at 21:52

## 2023-05-18 RX ADMIN — Medication 4: at 21:50

## 2023-05-18 RX ADMIN — Medication 500000 UNIT(S): at 06:11

## 2023-05-18 RX ADMIN — APIXABAN 5 MILLIGRAM(S): 2.5 TABLET, FILM COATED ORAL at 21:51

## 2023-05-18 RX ADMIN — Medication 0.5 MILLIGRAM(S): at 08:53

## 2023-05-18 RX ADMIN — Medication 20 MILLIGRAM(S): at 11:50

## 2023-05-18 RX ADMIN — FLUCONAZOLE 100 MILLIGRAM(S): 150 TABLET ORAL at 11:50

## 2023-05-18 RX ADMIN — Medication 500000 UNIT(S): at 15:13

## 2023-05-18 RX ADMIN — MAGNESIUM OXIDE 400 MG ORAL TABLET 400 MILLIGRAM(S): 241.3 TABLET ORAL at 11:51

## 2023-05-18 RX ADMIN — MEXILETINE HYDROCHLORIDE 200 MILLIGRAM(S): 150 CAPSULE ORAL at 15:13

## 2023-05-18 RX ADMIN — MAGNESIUM OXIDE 400 MG ORAL TABLET 400 MILLIGRAM(S): 241.3 TABLET ORAL at 17:52

## 2023-05-18 RX ADMIN — Medication 500000 UNIT(S): at 23:26

## 2023-05-18 RX ADMIN — INSULIN GLARGINE 10 UNIT(S): 100 INJECTION, SOLUTION SUBCUTANEOUS at 09:19

## 2023-05-18 RX ADMIN — PANTOPRAZOLE SODIUM 40 MILLIGRAM(S): 20 TABLET, DELAYED RELEASE ORAL at 21:51

## 2023-05-18 RX ADMIN — Medication 0.5 MILLIGRAM(S): at 20:57

## 2023-05-18 RX ADMIN — PANTOPRAZOLE SODIUM 40 MILLIGRAM(S): 20 TABLET, DELAYED RELEASE ORAL at 11:51

## 2023-05-18 RX ADMIN — Medication 3 MILLILITER(S): at 13:58

## 2023-05-18 RX ADMIN — Medication 6: at 17:51

## 2023-05-18 NOTE — PROGRESS NOTE ADULT - ASSESSMENT
73 y/o Female with h/o TIA, DVT, Colorectal CA s/p tx, Tracheobronchomalacia s/p tracheobronchoplasty 2016, adrenal insufficiency on steroids, COPD/Asthma, Afib/AC, T2DM was admitted on 5/4 from NH for fever x 2 days. At the NH she had PICC line placed and started on meropenem empirically 2 days PTA, but continues to have fever and worsening productive cough and SOB. She endorses dry heaves and vomiting  last night, denies any diarrhea/abd pain. In ER, pt found to be hypotensive and received vancomycin IV.     1. Acute on chronic respiratory failure resolving. Acute bronchiolitis with PRMI-ESBL. Allergy to PCN with anaphylaxis.   -respiratory improving  -sputum c/s noted  -new PRMI reported on recent sputum culture ?pathogen vs colonizant  -respiratory she remains frail  -on meropenem 1 gm IV q8h # 3  -tolerating abx well so far; no side effects noted  -monitor closely in donnie of PCN allergy history  -respiratory care  -pulmonary evaluation appreciated  -continue abx coverage   -monitor temps  -f/u CBC  -supportive care  2. Other issues:   -care per medicine    d/w Dr. Whitfield and pulmonary NP

## 2023-05-18 NOTE — PROGRESS NOTE ADULT - ASSESSMENT
#Sepsis as evidenced by hypotension, fever : No PNA  #Parainfluenza Bronchiolitis  # Hx of Bronchiectasis  # Oral thrush  #Asthma exacerbation  - ICU eval for hypotension appreciated; BP responded to iv fluids  - duonebs, alb inh prn  - pulm cs appreciated   - ID cs appreciated   - S/P  meropenem/vanco  - antitussives  - solumedrol 20 mg q 8 hrs- oral prednisone 20mg BID  diflucan  smart vest for chest PTx  sputum gram stain +, Sputum Cx grew moderate  P. mirabilis and ESBL Started on IV meropenem #3(5/16)   Pulse ox on ambulation WNL    # C/o Abd Gas, belching  -PPI BID   -maalox  -lots of air in the colostomy bag  -patient requesting GI consult. - trial of lactose free diet     #T2DM - uncontrolled 2ndry to steroids   - resume home dose insulin  - increase lantus 40 QHS and 10 in AM  - ISS  - monitor FS  changed diet to [facility] diet but refusing it and asking for regular diet.   Glucerna 1 can  tid    #Parox Afib, TIA  - c/w eliquis    # DVT PPX: on Eliquis      poc discussed with pt, team, Dr. Wilina MEHTA and advance care planning meeting done with the patient. She wishes to be fully resuscitated and mechanically ventilated if need arises. There are no limitations of any sort in her medical care and management. She is FULL CODE.    Dispo - FU witb ID regarding suraj, rehab once cleared

## 2023-05-18 NOTE — PROGRESS NOTE ADULT - SUBJECTIVE AND OBJECTIVE BOX
HOSPITALIST PROGRESS NOTE:  SUBJECTIVE:  PCP:  Chief Complaint: Patient is a 72y old  Female who presents with a chief complaint of fever (15 May 2023 10:15)      HPI:  HPI:  72F w/PMH TIA, DVT, Colorectal CA s/p tx, Tracheobronchomalacia s/p Tracheobronchoplasty 2016 (no further evidence), adrenal insufficiency on steroids, COPD/Asthma, Afib/AC, T2DM, recent admit 2 mos ago,  presents from NH for fever x2 days.  She had Picc line placed and started on meropenem empirically 2 days ago, but continues to have fever and worsening productive cough and sob. Pt is AAOx3 and gives her own hx.  She endorses dry heaves and vomiting  last night, denies any diarrhea/abd pain.  At time of my exam, pt found to be hypotensive on repeat bp checks.      In ED, MD d/w ID, rec to remove picc line (to be done by ED), given vanco iV, 1L ivf, duoneb, +febrile, CT chest: Increased infectious or inflammatory bronchitis/bronchiolitis since   4/5/2023.    5/18: Pt seen and examined. no new complaints. states SOB improving slowly. +cough. no fevers. sugars improved today but still elevated.     Allergies:  iodine (Short breath; Swelling)  Avelox (Short breath; Pruritus)  shellfish (Anaphylaxis)  aspirin (Short breath)  Dilaudid (Short breath)  codeine (Short breath)  cefepime (Anaphylaxis)  penicillin (Anaphylaxis)  tetanus toxoid (Short breath)  Valium (Short breath)    REVIEW OF SYSTEMS:  See HPI. All other review of systems is negative unless indicated above.     OBJECTIVE  Physical Exam:  Vital Signs Last 24 Hrs  T(C): 36.7 (18 May 2023 07:55), Max: 36.7 (18 May 2023 07:55)  T(F): 98.1 (18 May 2023 07:55), Max: 98.1 (18 May 2023 07:55)  HR: 88 (18 May 2023 14:05) (72 - 88)  BP: 132/65 (18 May 2023 07:55) (108/65 - 140/68)  BP(mean): --  RR: 16 (18 May 2023 07:55) (16 - 18)  SpO2: 97% (18 May 2023 14:05) (95% - 100%)    Parameters below as of 18 May 2023 14:05  Patient On (Oxygen Delivery Method): room air    Constitutional: NAD, awake and alert  Neurological: AAO x 3, no focal deficits  HEENT: PERRLA, EOMI, MMM  Neck: Soft and supple, No LAD, No JVD  Respiratory: Breath sounds are clear bilaterally, No wheezing, rales or rhonchi  Cardiovascular: S1 and S2, regular rate and rhythm; no Murmurs, gallops or rubs  Gastrointestinal: Bowel Sounds present, soft, nontender, nondistended, no guarding, no rebound tenderness  Back: No CVA tenderness   Extremities: No peripheral edema  Vascular: 2+ peripheral pulses  Musculoskeletal: 5/5 strength b/l upper and lower extremities  Skin: No rashes  Breast: Deferred  Rectal: Deferred    Labs                              11.7   18.80 )-----------( 186      ( 18 May 2023 07:15 )             39.5     18 May 2023 09:03    140    |  109    |  25     ----------------------------<  178    4.5     |  27     |  0.75     Ca    8.3        18 May 2023 09:03  Phos  3.3       18 May 2023 09:03  Mg     2.3       18 May 2023 09:03        CAPILLARY BLOOD GLUCOSE      POCT Blood Glucose.: 293 mg/dL (18 May 2023 13:21)  POCT Blood Glucose.: 157 mg/dL (18 May 2023 08:46)  POCT Blood Glucose.: 236 mg/dL (17 May 2023 21:50)  POCT Blood Glucose.: 363 mg/dL (17 May 2023 17:43)      MICROBIOLOGY/CULTURES:  Culture Results:   Numerous Proteus mirabilis ESBL  Normal Respiratory Jessica present (05-13 @ 15:02)      RADIOLOGY/EKG:      < from: Xray Chest 1 View- PORTABLE-Urgent (Xray Chest 1 View- PORTABLE-Urgent .) (05.04.23 @ 10:47) >    IMPRESSION: Persistent small density right base laterally.    < end of copied text >  < from: CT Chest No Cont (05.04.23 @ 13:36) >    IMPRESSION:    Increased infectious or inflammatory bronchitis/bronchiolitis since   4/5/2023.    < end of copied text >

## 2023-05-18 NOTE — PROGRESS NOTE ADULT - SUBJECTIVE AND OBJECTIVE BOX
Date of service: 23 @ 08:42    Lying in bed in NAD  Has cough with scant sputum  SOB with light exercise     ROS: no fever or chills; denies dizziness, no HA, no abdominal pain, no diarrhea or constipation; no dysuria, no legs pain, no rashes    MEDICATIONS  (STANDING):  acetylcysteine 10%  Inhalation 4 milliLiter(s) Inhalation three times a day  albuterol/ipratropium for Nebulization 3 milliLiter(s) Nebulizer every 6 hours  apixaban 5 milliGRAM(s) Oral two times a day  buDESOnide    Inhalation Suspension 0.5 milliGRAM(s) Inhalation every 12 hours  dextrose 5%. 1000 milliLiter(s) (50 mL/Hr) IV Continuous <Continuous>  dextrose 5%. 1000 milliLiter(s) (100 mL/Hr) IV Continuous <Continuous>  dextrose 50% Injectable 25 Gram(s) IV Push once  dextrose 50% Injectable 12.5 Gram(s) IV Push once  dextrose 50% Injectable 25 Gram(s) IV Push once  famotidine    Tablet 20 milliGRAM(s) Oral daily  fluconAZOLE   Tablet 100 milliGRAM(s) Oral daily  glucagon  Injectable 1 milliGRAM(s) IntraMuscular once  insulin glargine Injectable (LANTUS) 30 Unit(s) SubCutaneous at bedtime  insulin lispro (ADMELOG) corrective regimen sliding scale   SubCutaneous at bedtime  insulin lispro (ADMELOG) corrective regimen sliding scale   SubCutaneous three times a day before meals  magnesium oxide 400 milliGRAM(s) Oral two times a day with meals  meropenem  IVPB 1000 milliGRAM(s) IV Intermittent every 8 hours  mexiletine 200 milliGRAM(s) Oral every 8 hours  montelukast 10 milliGRAM(s) Oral at bedtime  nystatin    Suspension 222126 Unit(s) Oral every 6 hours  pantoprazole    Tablet 40 milliGRAM(s) Oral two times a day  polyethylene glycol 3350 17 Gram(s) Oral daily  predniSONE   Tablet 20 milliGRAM(s) Oral two times a day    Vital Signs Last 24 Hrs  T(C): 36.5 (17 May 2023 08:09), Max: 37.1 (16 May 2023 16:24)  T(F): 97.7 (17 May 2023 08:09), Max: 98.8 (16 May 2023 16:24)  HR: 68 (17 May 2023 08:09) (68 - 88)  BP: 124/76 (17 May 2023 08:09) (119/65 - 132/58)  BP(mean): --  RR: 17 (17 May 2023 08:09) (14 - 17)  SpO2: 100% (17 May 2023 08:09) (95% - 100%)    Parameters below as of 17 May 2023 08:09  Patient On (Oxygen Delivery Method): room air     Physical exam:    Constitutional:  No acute distress  HEENT: NC/AT, EOMI, PERRLA, conjunctivae clear; ears and nose atraumatic  Neck: supple; thyroid not palpable  Back: no tenderness  Respiratory: respiratory effort normal; few crackles at bases  Cardiovascular: S1S2 regular, no murmurs  Abdomen: soft, not tender, not distended, positive BS  Genitourinary: no suprapubic tenderness  Lymphatic: no LN palpable  Musculoskeletal: no muscle tenderness, no joint swelling or tenderness  Extremities: no pedal edema  Neurological/ Psychiatric: AxOx3, judgement and insight normal; moving all extremities  Skin: no rashes; no palpable lesions    Labs: reviewed                        11.7   18.80 )-----------( 186      ( 18 May 2023 07:15 )             39.5                         11.9   12.53 )-----------( 197      ( 16 May 2023 06:23 )             40.1     05-    140  |  107  |  24<H>  ----------------------------<  355<H>  5.0   |  29  |  0.71    Ca    8.7      16 May 2023 06:23  Phos  2.6     05-16  Mg     2.5     05-16                        11.8   8.07  )-----------( 181      ( 05 May 2023 06:21 )             42.0     05-05    137  |  107  |  12  ----------------------------<  248<H>  4.9   |  23  |  0.56    Ca    8.7      05 May 2023 06:21    TPro  7.6  /  Alb  3.2<L>  /  TBili  0.3  /  DBili  x   /  AST  27  /  ALT  21  /  AlkPhos  104       LIVER FUNCTIONS - ( 04 May 2023 09:51 )  Alb: 3.2 g/dL / Pro: 7.6 gm/dL / ALK PHOS: 104 U/L / ALT: 21 U/L / AST: 27 U/L / GGT: x           Urinalysis Basic - ( 04 May 2023 13:31 )    Color: Yellow / Appearance: Clear / S.015 / pH: x  Gluc: x / Ketone: Small  / Bili: Negative / Urobili: Negative   Blood: x / Protein: Negative / Nitrite: Negative   Leuk Esterase: Negative / RBC: 6-10 /HPF / WBC 3-5 /HPF   Sq Epi: x / Non Sq Epi: x / Bacteria: Occasional    Parainfluenza 3 ( @ 09:51)  Detected    Culture - Sputum (collected 13 May 2023 15:02)  Source: .Sputum Sputum  Gram Stain (14 May 2023 00:02):    Moderate polymorphonuclear leukocytes per low power field    No Squamous epithelial cells per low power field    Moderate Gram Negative Rods per oil power field  Final Report (15 May 2023 21:45):    Numerous Proteus mirabilis ESBL    Normal Respiratory Jessica present  Organism: Proteus mirabilis ESBL (15 May 2023 21:45)  Organism: Proteus mirabilis ESBL (15 May 2023 21:45)      Method Type: GARRETT      -  Amikacin: S <=16      -  Amoxicillin/Clavulanic Acid: S <=8/4      -  Ampicillin: R >16 These ampicillin results predict results for amoxicillin      -  Ampicillin/Sulbactam: R <=4/2 Enterobacter, Klebsiella aerogenes, Citrobacter, and Serratia may develop resistance during prolonged therapy (3-4 days)      -  Aztreonam: R >16      -  Cefazolin: R >16 Enterobacter, Klebsiella aerogenes, Citrobacter, and Serratia may develop resistance during prolonged therapy (3-4 days)      -  Cefepime: R >16      -  Ceftriaxone: R >32 Enterobacter, Klebsiella aerogenes, Citrobacter, and Serratia may develop resistance during prolonged therapy      -  Ciprofloxacin: R >2      -  Ertapenem: S <=0.5      -  Gentamicin: R >8      -  Levofloxacin: R >4      -  Meropenem: S <=1      -  Piperacillin/Tazobactam: R <=8      -  Tobramycin: R >8      -  Trimethoprim/Sulfamethoxazole: R >2/38    Radiology: all available radiological tests reviewed    < from: CT Chest No Cont (23 @ 13:36) >  Increased infectious or inflammatory bronchitis/bronchiolitis since 2023.  < end of copied text >    Advanced directives addressed: full resuscitation

## 2023-05-19 LAB
ANION GAP SERPL CALC-SCNC: 5 MMOL/L — SIGNIFICANT CHANGE UP (ref 5–17)
BUN SERPL-MCNC: 26 MG/DL — HIGH (ref 7–23)
CALCIUM SERPL-MCNC: 8.6 MG/DL — SIGNIFICANT CHANGE UP (ref 8.5–10.1)
CHLORIDE SERPL-SCNC: 107 MMOL/L — SIGNIFICANT CHANGE UP (ref 96–108)
CO2 SERPL-SCNC: 26 MMOL/L — SIGNIFICANT CHANGE UP (ref 22–31)
CREAT SERPL-MCNC: 0.76 MG/DL — SIGNIFICANT CHANGE UP (ref 0.5–1.3)
EGFR: 82 ML/MIN/1.73M2 — SIGNIFICANT CHANGE UP
GLUCOSE BLDC GLUCOMTR-MCNC: 237 MG/DL — HIGH (ref 70–99)
GLUCOSE BLDC GLUCOMTR-MCNC: 311 MG/DL — HIGH (ref 70–99)
GLUCOSE BLDC GLUCOMTR-MCNC: 358 MG/DL — HIGH (ref 70–99)
GLUCOSE BLDC GLUCOMTR-MCNC: 369 MG/DL — HIGH (ref 70–99)
GLUCOSE SERPL-MCNC: 352 MG/DL — HIGH (ref 70–99)
HCT VFR BLD CALC: 39.7 % — SIGNIFICANT CHANGE UP (ref 34.5–45)
HGB BLD-MCNC: 12.1 G/DL — SIGNIFICANT CHANGE UP (ref 11.5–15.5)
MCHC RBC-ENTMCNC: 22.1 PG — LOW (ref 27–34)
MCHC RBC-ENTMCNC: 30.5 GM/DL — LOW (ref 32–36)
MCV RBC AUTO: 72.6 FL — LOW (ref 80–100)
PLATELET # BLD AUTO: 172 K/UL — SIGNIFICANT CHANGE UP (ref 150–400)
POTASSIUM SERPL-MCNC: 4.8 MMOL/L — SIGNIFICANT CHANGE UP (ref 3.5–5.3)
POTASSIUM SERPL-SCNC: 4.8 MMOL/L — SIGNIFICANT CHANGE UP (ref 3.5–5.3)
RBC # BLD: 5.47 M/UL — HIGH (ref 3.8–5.2)
RBC # FLD: 24 % — HIGH (ref 10.3–14.5)
SODIUM SERPL-SCNC: 138 MMOL/L — SIGNIFICANT CHANGE UP (ref 135–145)
WBC # BLD: 16.7 K/UL — HIGH (ref 3.8–10.5)
WBC # FLD AUTO: 16.7 K/UL — HIGH (ref 3.8–10.5)

## 2023-05-19 PROCEDURE — 99232 SBSQ HOSP IP/OBS MODERATE 35: CPT

## 2023-05-19 PROCEDURE — 99233 SBSQ HOSP IP/OBS HIGH 50: CPT

## 2023-05-19 RX ORDER — POLYETHYLENE GLYCOL 3350 17 G/17G
17 POWDER, FOR SOLUTION ORAL ONCE
Refills: 0 | Status: COMPLETED | OUTPATIENT
Start: 2023-05-19 | End: 2023-05-19

## 2023-05-19 RX ADMIN — Medication 3 MILLILITER(S): at 14:28

## 2023-05-19 RX ADMIN — Medication 20 MILLIGRAM(S): at 09:34

## 2023-05-19 RX ADMIN — Medication 8: at 17:27

## 2023-05-19 RX ADMIN — FLUCONAZOLE 100 MILLIGRAM(S): 150 TABLET ORAL at 09:34

## 2023-05-19 RX ADMIN — MEROPENEM 100 MILLIGRAM(S): 1 INJECTION INTRAVENOUS at 22:08

## 2023-05-19 RX ADMIN — INSULIN GLARGINE 40 UNIT(S): 100 INJECTION, SOLUTION SUBCUTANEOUS at 22:11

## 2023-05-19 RX ADMIN — MEXILETINE HYDROCHLORIDE 200 MILLIGRAM(S): 150 CAPSULE ORAL at 13:50

## 2023-05-19 RX ADMIN — Medication 10: at 12:57

## 2023-05-19 RX ADMIN — Medication 30 MILLILITER(S): at 05:23

## 2023-05-19 RX ADMIN — Medication 500000 UNIT(S): at 05:24

## 2023-05-19 RX ADMIN — MAGNESIUM OXIDE 400 MG ORAL TABLET 400 MILLIGRAM(S): 241.3 TABLET ORAL at 09:02

## 2023-05-19 RX ADMIN — MEROPENEM 100 MILLIGRAM(S): 1 INJECTION INTRAVENOUS at 13:50

## 2023-05-19 RX ADMIN — MEXILETINE HYDROCHLORIDE 200 MILLIGRAM(S): 150 CAPSULE ORAL at 22:12

## 2023-05-19 RX ADMIN — Medication 3 MILLILITER(S): at 20:34

## 2023-05-19 RX ADMIN — Medication 500000 UNIT(S): at 17:27

## 2023-05-19 RX ADMIN — PANTOPRAZOLE SODIUM 40 MILLIGRAM(S): 20 TABLET, DELAYED RELEASE ORAL at 09:34

## 2023-05-19 RX ADMIN — Medication 0.5 MILLIGRAM(S): at 20:34

## 2023-05-19 RX ADMIN — PANTOPRAZOLE SODIUM 40 MILLIGRAM(S): 20 TABLET, DELAYED RELEASE ORAL at 22:12

## 2023-05-19 RX ADMIN — INSULIN GLARGINE 10 UNIT(S): 100 INJECTION, SOLUTION SUBCUTANEOUS at 09:01

## 2023-05-19 RX ADMIN — Medication 6: at 22:08

## 2023-05-19 RX ADMIN — Medication 500000 UNIT(S): at 23:08

## 2023-05-19 RX ADMIN — MEXILETINE HYDROCHLORIDE 200 MILLIGRAM(S): 150 CAPSULE ORAL at 05:24

## 2023-05-19 RX ADMIN — APIXABAN 5 MILLIGRAM(S): 2.5 TABLET, FILM COATED ORAL at 09:34

## 2023-05-19 RX ADMIN — Medication 4 MILLILITER(S): at 14:28

## 2023-05-19 RX ADMIN — Medication 4 MILLILITER(S): at 20:33

## 2023-05-19 RX ADMIN — MONTELUKAST 10 MILLIGRAM(S): 4 TABLET, CHEWABLE ORAL at 22:12

## 2023-05-19 RX ADMIN — Medication 20 MILLIGRAM(S): at 22:12

## 2023-05-19 RX ADMIN — POLYETHYLENE GLYCOL 3350 17 GRAM(S): 17 POWDER, FOR SOLUTION ORAL at 13:49

## 2023-05-19 RX ADMIN — APIXABAN 5 MILLIGRAM(S): 2.5 TABLET, FILM COATED ORAL at 22:12

## 2023-05-19 RX ADMIN — Medication 4: at 09:01

## 2023-05-19 RX ADMIN — MEROPENEM 100 MILLIGRAM(S): 1 INJECTION INTRAVENOUS at 05:23

## 2023-05-19 RX ADMIN — MAGNESIUM OXIDE 400 MG ORAL TABLET 400 MILLIGRAM(S): 241.3 TABLET ORAL at 17:26

## 2023-05-19 RX ADMIN — Medication 30 MILLILITER(S): at 13:49

## 2023-05-19 RX ADMIN — Medication 500000 UNIT(S): at 12:57

## 2023-05-19 NOTE — PROGRESS NOTE ADULT - ASSESSMENT
#Sepsis as evidenced by hypotension, fever : No PNA  #Parainfluenza Bronchiolitis  # Hx of Bronchiectasis  # Oral thrush  #Asthma exacerbation  - ICU eval for hypotension appreciated; BP responded to iv fluids  - duonebs, alb inh prn  - pulm cs appreciated   - ID cs appreciated   - S/P  meropenem/vanco  - antitussives  - solumedrol 20 mg q 8 hrs- oral prednisone 20mg BID  diflucan  smart vest for chest PTx  sputum gram stain +, Sputum Cx grew moderate  P. mirabilis and ESBL Started on IV meropenem #3(5/16)   Pulse ox on ambulation WNL    # C/o Abd Gas, belching  -PPI BID   -maalox  -lots of air in the colostomy bag  -patient requesting GI consult. - trial of lactose free diet     #T2DM - uncontrolled 2ndry to steroids   - resume home dose insulin  - increase lantus 40 QHS and 10 in AM  - ISS  - monitor FS  changed diet to [facility] diet but refusing it and asking for regular diet.   Glucerna 1 can  tid    #Parox Afib, TIA  - c/w eliquis    # DVT PPX: on Eliquis      poc discussed with pt, team, Dr. Wilian MEHTA and advance care planning meeting done with the patient. She wishes to be fully resuscitated and mechanically ventilated if need arises. There are no limitations of any sort in her medical care and management. She is FULL CODE.    Dispo - cont Meropenem till 5/22

## 2023-05-19 NOTE — PROGRESS NOTE ADULT - SUBJECTIVE AND OBJECTIVE BOX
HOSPITALIST PROGRESS NOTE:  SUBJECTIVE:  PCP:  Chief Complaint: Patient is a 72y old  Female who presents with a chief complaint of fever (15 May 2023 10:15)      HPI:  HPI:  72F w/PMH TIA, DVT, Colorectal CA s/p tx, Tracheobronchomalacia s/p Tracheobronchoplasty 2016 (no further evidence), adrenal insufficiency on steroids, COPD/Asthma, Afib/AC, T2DM, recent admit 2 mos ago,  presents from NH for fever x2 days.  She had Picc line placed and started on meropenem empirically 2 days ago, but continues to have fever and worsening productive cough and sob. Pt is AAOx3 and gives her own hx.  She endorses dry heaves and vomiting  last night, denies any diarrhea/abd pain.  At time of my exam, pt found to be hypotensive on repeat bp checks.      In ED, MD d/w ID, rec to remove picc line (to be done by ED), given vanco iV, 1L ivf, duoneb, +febrile, CT chest: Increased infectious or inflammatory bronchitis/bronchiolitis since   4/5/2023.    5/18: Pt seen and examined. no new complaints. states SOB improving slowly. +cough. no fevers. sugars improved today but still elevated.     5/19: feeling much better  cont meropenem till 5/22    Allergies:  iodine (Short breath; Swelling)  Avelox (Short breath; Pruritus)  shellfish (Anaphylaxis)  aspirin (Short breath)  Dilaudid (Short breath)  codeine (Short breath)  cefepime (Anaphylaxis)  penicillin (Anaphylaxis)  tetanus toxoid (Short breath)  Valium (Short breath)    REVIEW OF SYSTEMS:  See HPI. All other review of systems is negative unless indicated above.     OBJECTIVE  Physical Exam:      PHYSICAL EXAM:    Daily     Daily     Vital Signs Last 24 Hrs  T(C): 36.6 (19 May 2023 15:35), Max: 36.7 (18 May 2023 23:23)  T(F): 97.9 (19 May 2023 15:35), Max: 98.1 (18 May 2023 23:23)  HR: 81 (19 May 2023 15:35) (74 - 84)  BP: 117/61 (19 May 2023 15:35) (92/68 - 117/70)  BP(mean): --  RR: 18 (19 May 2023 15:35) (17 - 18)  SpO2: 99% (19 May 2023 15:35) (98% - 99%)    Constitutional: Weak  appearing  HEENT: Atraumatic, EBER, Normal, No congestion  Respiratory: Breath Sounds normal, no rhonchi/wheeze  Cardiovascular: N S1S2;   Gastrointestinal: Abdomen soft, non tender, Bowel Sounds present  Extremities: No edema, peripheral pulses present  Neurological: AAO x 3, no gross focal motor deficits  Skin: Non cellulitic, no rash, ulcers  Lymph Nodes: No lymphadenopathy noted  Back: No CVA tenderness   Musculoskeletal: non tender  Breasts: Deferred  Genitourinary: deferred  Rectal: Deferred    All Labs/EKG/Radiology/Meds reviewed    Lab Results:  CBC  CBC Full  -  ( 19 May 2023 07:52 )  WBC Count : 16.70 K/uL  RBC Count : 5.47 M/uL  Hemoglobin : 12.1 g/dL  Hematocrit : 39.7 %  Platelet Count - Automated : 172 K/uL  Mean Cell Volume : 72.6 fl  Mean Cell Hemoglobin : 22.1 pg  Mean Cell Hemoglobin Concentration : 30.5 gm/dL  Auto Neutrophil # : x  Auto Lymphocyte # : x  Auto Monocyte # : x  Auto Eosinophil # : x  Auto Basophil # : x  Auto Neutrophil % : x  Auto Lymphocyte % : x  Auto Monocyte % : x  Auto Eosinophil % : x  Auto Basophil % : x    .		Differential:	[] Automated		[] Manual  Chemistry  05-19    138  |  107  |  26<H>  ----------------------------<  352<H>  4.8   |  26  |  0.76    Ca    8.6      19 May 2023 07:52  Phos  3.3     05-18  Mg     2.3     05-18 05-18-23 @ 07:01  -  05-19-23 @ 07:00  --------------------------------------------------------  IN: 50 mL / OUT: 0 mL / NET: 50 mL    05-19-23 @ 07:01  -  05-19-23 @ 16:09  --------------------------------------------------------  IN: 50 mL / OUT: 0 mL / NET: 50 mL      MICROBIOLOGY/CULTURES:  Culture Results:   Numerous Proteus mirabilis ESBL  Normal Respiratory Jessica present (05-13 @ 15:02)          MICROBIOLOGY/CULTURES:  Culture Results:   Numerous Proteus mirabilis ESBL  Normal Respiratory Jessica present (05-13 @ 15:02)      RADIOLOGY/EKG:      < from: Xray Chest 1 View- PORTABLE-Urgent (Xray Chest 1 View- PORTABLE-Urgent .) (05.04.23 @ 10:47) >    IMPRESSION: Persistent small density right base laterally.    < end of copied text >  < from: CT Chest No Cont (05.04.23 @ 13:36) >    IMPRESSION:    Increased infectious or inflammatory bronchitis/bronchiolitis since   4/5/2023.    < end of copied text >

## 2023-05-19 NOTE — PROGRESS NOTE ADULT - SUBJECTIVE AND OBJECTIVE BOX
Date of service: 23 @ 09:55    Lying in bed in NAD  Has dry cough  SOB is improving    ROS: no fever or chills; denies dizziness, no HA, no abdominal pain, no diarrhea or constipation; no dysuria, no legs pain, no rashes    MEDICATIONS  (STANDING):  acetylcysteine 10%  Inhalation 4 milliLiter(s) Inhalation three times a day  albuterol/ipratropium for Nebulization 3 milliLiter(s) Nebulizer every 6 hours  apixaban 5 milliGRAM(s) Oral two times a day  buDESOnide    Inhalation Suspension 0.5 milliGRAM(s) Inhalation every 12 hours  dextrose 5%. 1000 milliLiter(s) (50 mL/Hr) IV Continuous <Continuous>  dextrose 5%. 1000 milliLiter(s) (100 mL/Hr) IV Continuous <Continuous>  dextrose 50% Injectable 25 Gram(s) IV Push once  dextrose 50% Injectable 12.5 Gram(s) IV Push once  dextrose 50% Injectable 25 Gram(s) IV Push once  fluconAZOLE   Tablet 100 milliGRAM(s) Oral daily  glucagon  Injectable 1 milliGRAM(s) IntraMuscular once  insulin glargine Injectable (LANTUS) 10 Unit(s) SubCutaneous every morning  insulin glargine Injectable (LANTUS) 40 Unit(s) SubCutaneous at bedtime  insulin lispro (ADMELOG) corrective regimen sliding scale   SubCutaneous three times a day before meals  insulin lispro (ADMELOG) corrective regimen sliding scale   SubCutaneous at bedtime  magnesium oxide 400 milliGRAM(s) Oral two times a day with meals  meropenem  IVPB 1000 milliGRAM(s) IV Intermittent every 8 hours  mexiletine 200 milliGRAM(s) Oral every 8 hours  montelukast 10 milliGRAM(s) Oral at bedtime  nystatin    Suspension 430574 Unit(s) Oral every 6 hours  pantoprazole    Tablet 40 milliGRAM(s) Oral two times a day  polyethylene glycol 3350 17 Gram(s) Oral daily  predniSONE   Tablet 20 milliGRAM(s) Oral two times a day    Vital Signs Last 24 Hrs  T(C): 36.4 (19 May 2023 08:30), Max: 36.7 (18 May 2023 23:23)  T(F): 97.5 (19 May 2023 08:30), Max: 98.1 (18 May 2023 23:23)  HR: 74 (19 May 2023 08:30) (74 - 88)  BP: 92/68 (19 May 2023 08:30) (92/68 - 126/63)  BP(mean): --  RR: 17 (19 May 2023 08:30) (17 - 18)  SpO2: 98% (19 May 2023 08:30) (97% - 100%)    Parameters below as of 19 May 2023 08:30  Patient On (Oxygen Delivery Method): room air     Physical exam:    Constitutional:  No acute distress  HEENT: NC/AT, EOMI, PERRLA, conjunctivae clear; ears and nose atraumatic  Neck: supple; thyroid not palpable  Back: no tenderness  Respiratory: respiratory effort normal; few crackles at bases  Cardiovascular: S1S2 regular, no murmurs  Abdomen: soft, not tender, not distended, positive BS  Genitourinary: no suprapubic tenderness  Lymphatic: no LN palpable  Musculoskeletal: no muscle tenderness, no joint swelling or tenderness  Extremities: no pedal edema  Neurological/ Psychiatric: AxOx3, judgement and insight normal; moving all extremities  Skin: no rashes; no palpable lesions    Labs: reviewed                        12.1   16.70 )-----------( 172      ( 19 May 2023 07:52 )             39.7     05-19    138  |  107  |  26<H>  ----------------------------<  352<H>  4.8   |  26  |  0.76    Ca    8.6      19 May 2023 07:52  Phos  3.3     05-18  Mg     2.3     05-18                        11.9   12.53 )-----------( 197      ( 16 May 2023 06:23 )             40.1     05-16    140  |  107  |  24<H>  ----------------------------<  355<H>  5.0   |  29  |  0.71    Ca    8.7      16 May 2023 06:23  Phos  2.6     05-16  Mg     2.5     05-16                        11.8   8.07  )-----------( 181      ( 05 May 2023 06:21 )             42.0     05-05    137  |  107  |  12  ----------------------------<  248<H>  4.9   |  23  |  0.56    Ca    8.7      05 May 2023 06:21    TPro  7.6  /  Alb  3.2<L>  /  TBili  0.3  /  DBili  x   /  AST  27  /  ALT  21  /  AlkPhos  104  05-04     LIVER FUNCTIONS - ( 04 May 2023 09:51 )  Alb: 3.2 g/dL / Pro: 7.6 gm/dL / ALK PHOS: 104 U/L / ALT: 21 U/L / AST: 27 U/L / GGT: x           Urinalysis Basic - ( 04 May 2023 13:31 )    Color: Yellow / Appearance: Clear / S.015 / pH: x  Gluc: x / Ketone: Small  / Bili: Negative / Urobili: Negative   Blood: x / Protein: Negative / Nitrite: Negative   Leuk Esterase: Negative / RBC: 6-10 /HPF / WBC 3-5 /HPF   Sq Epi: x / Non Sq Epi: x / Bacteria: Occasional    Parainfluenza 3 ( @ 09:51)  Detected    Culture - Sputum (collected 13 May 2023 15:02)  Source: .Sputum Sputum  Gram Stain (14 May 2023 00:02):    Moderate polymorphonuclear leukocytes per low power field    No Squamous epithelial cells per low power field    Moderate Gram Negative Rods per oil power field  Final Report (15 May 2023 21:45):    Numerous Proteus mirabilis ESBL    Normal Respiratory Jessica present  Organism: Proteus mirabilis ESBL (15 May 2023 21:45)  Organism: Proteus mirabilis ESBL (15 May 2023 21:45)      Method Type: GARRETT      -  Amikacin: S <=16      -  Amoxicillin/Clavulanic Acid: S <=8/4      -  Ampicillin: R >16 These ampicillin results predict results for amoxicillin      -  Ampicillin/Sulbactam: R <=4/2 Enterobacter, Klebsiella aerogenes, Citrobacter, and Serratia may develop resistance during prolonged therapy (3-4 days)      -  Aztreonam: R >16      -  Cefazolin: R >16 Enterobacter, Klebsiella aerogenes, Citrobacter, and Serratia may develop resistance during prolonged therapy (3-4 days)      -  Cefepime: R >16      -  Ceftriaxone: R >32 Enterobacter, Klebsiella aerogenes, Citrobacter, and Serratia may develop resistance during prolonged therapy      -  Ciprofloxacin: R >2      -  Ertapenem: S <=0.5      -  Gentamicin: R >8      -  Levofloxacin: R >4      -  Meropenem: S <=1      -  Piperacillin/Tazobactam: R <=8      -  Tobramycin: R >8      -  Trimethoprim/Sulfamethoxazole: R >38    Radiology: all available radiological tests reviewed    < from: CT Chest No Cont (23 @ 13:36) >  Increased infectious or inflammatory bronchitis/bronchiolitis since 2023.  < end of copied text >    Advanced directives addressed: full resuscitation

## 2023-05-19 NOTE — PROGRESS NOTE ADULT - SUBJECTIVE AND OBJECTIVE BOX
Subjective:    Awake, alert. Improved overall.    MEDICATIONS  (STANDING):  acetylcysteine 10%  Inhalation 4 milliLiter(s) Inhalation three times a day  albuterol/ipratropium for Nebulization 3 milliLiter(s) Nebulizer every 6 hours  apixaban 5 milliGRAM(s) Oral two times a day  buDESOnide    Inhalation Suspension 0.5 milliGRAM(s) Inhalation every 12 hours  dextrose 5%. 1000 milliLiter(s) (100 mL/Hr) IV Continuous <Continuous>  dextrose 5%. 1000 milliLiter(s) (50 mL/Hr) IV Continuous <Continuous>  dextrose 50% Injectable 25 Gram(s) IV Push once  dextrose 50% Injectable 12.5 Gram(s) IV Push once  dextrose 50% Injectable 25 Gram(s) IV Push once  fluconAZOLE   Tablet 100 milliGRAM(s) Oral daily  glucagon  Injectable 1 milliGRAM(s) IntraMuscular once  insulin glargine Injectable (LANTUS) 10 Unit(s) SubCutaneous every morning  insulin glargine Injectable (LANTUS) 40 Unit(s) SubCutaneous at bedtime  insulin lispro (ADMELOG) corrective regimen sliding scale   SubCutaneous three times a day before meals  insulin lispro (ADMELOG) corrective regimen sliding scale   SubCutaneous at bedtime  magnesium oxide 400 milliGRAM(s) Oral two times a day with meals  meropenem  IVPB 1000 milliGRAM(s) IV Intermittent every 8 hours  mexiletine 200 milliGRAM(s) Oral every 8 hours  montelukast 10 milliGRAM(s) Oral at bedtime  nystatin    Suspension 970893 Unit(s) Oral every 6 hours  pantoprazole    Tablet 40 milliGRAM(s) Oral two times a day  polyethylene glycol 3350 17 Gram(s) Oral daily  predniSONE   Tablet 20 milliGRAM(s) Oral two times a day    MEDICATIONS  (PRN):  acetaminophen     Tablet .. 650 milliGRAM(s) Oral every 6 hours PRN Temp greater or equal to 38C (100.4F), Mild Pain (1 - 3)  albuterol    90 MICROgram(s) HFA Inhaler 2 Puff(s) Inhalation every 4 hours PRN Shortness of Breath  aluminum hydroxide/magnesium hydroxide/simethicone Suspension 30 milliLiter(s) Oral every 4 hours PRN Dyspepsia  dextrose Oral Gel 15 Gram(s) Oral once PRN Blood Glucose LESS THAN 70 milliGRAM(s)/deciliter  guaifenesin/dextromethorphan Oral Liquid 10 milliLiter(s) Oral every 4 hours PRN Cough  melatonin 3 milliGRAM(s) Oral at bedtime PRN Insomnia  ondansetron Injectable 4 milliGRAM(s) IV Push every 8 hours PRN Nausea and/or Vomiting  simethicone 80 milliGRAM(s) Chew three times a day PRN Gas      Allergies    iodine (Short breath; Swelling)  Avelox (Short breath; Pruritus)  shellfish (Anaphylaxis)  aspirin (Short breath)  Dilaudid (Short breath)  codeine (Short breath)  cefepime (Anaphylaxis)  penicillin (Anaphylaxis)  tetanus toxoid (Short breath)  Valium (Short breath)    Intolerances        Vital Signs Last 24 Hrs  T(C): 36.4 (19 May 2023 08:30), Max: 36.7 (18 May 2023 23:23)  T(F): 97.5 (19 May 2023 08:30), Max: 98.1 (18 May 2023 23:23)  HR: 74 (19 May 2023 08:30) (74 - 88)  BP: 92/68 (19 May 2023 08:30) (92/68 - 126/63)  BP(mean): --  RR: 17 (19 May 2023 08:30) (17 - 18)  SpO2: 98% (19 May 2023 08:30) (97% - 100%)    Parameters below as of 19 May 2023 08:30  Patient On (Oxygen Delivery Method): room air        PHYSICAL EXAMINATION:    NECK:  Supple. No lymphadenopathy. Jugular venous pressure not elevated.   HEART:   The cardiac impulse has a normal quality. Reg., Nl S1 and S2.    CHEST:  Chest with exp. wheezes and rhonchi. Normal respiratory effort.  ABDOMEN:  Soft and nontender.   EXTREMITIES:  There is no edema.       LABS:                        12.1   16.70 )-----------( 172      ( 19 May 2023 07:52 )             39.7     05-19    138  |  107  |  26<H>  ----------------------------<  352<H>  4.8   |  26  |  0.76    Ca    8.6      19 May 2023 07:52  Phos  3.3     05-18  Mg     2.3     05-18            RADIOLOGY & ADDITIONAL TESTS: < from: Xray Chest 1 View- PORTABLE-Routine (Xray Chest 1 View- PORTABLE-Routine in AM.) (05.16.23 @ 08:19) >  ACC: [id number] EXAM:  XR CHEST PORTABLE ROUTINE 1V   ORDERED BY: SHERRY RIVAS     PROCEDURE DATE:  05/16/2023          INTERPRETATION:  TIME OF EXAM: May 16, 2023 at 7:23 AM.    CLINICAL INFORMATION: Severe asthma. Bronchiectasis. Nichole sputum.    COMPARISON:  AP chest x-ray and CT scan of the chest from May 4, 2023.    TECHNIQUE:   AP Portable chest x-ray.    INTERPRETATION:    Heart size and the mediastinum cannot be accurately evaluated on this   projection. Median sternotomy sutures and surgical clips, and aortic   valve replacement are seen.  No significant interval change in patchy right lower lung opacities. The   left lung appears clear.  No pleural effusion or pneumothorax seen.  No acute bony abnormality.    IMPRESSION:  No significant interval change in patchy right lower lung   opacities corresponding to findings on the CT scan. Please see that   report for additional detail.        EDI FIELD MD          Assessment/Plan    - Maintain prednisone 20 mg 2 times per day.   - Aerosols with Duoneb/Budesonide  - Mucomyst 10% TID  - Chest PT/Smart Vest  - Singulair  - Monitor O2 Sats  - OOB to chair  - Sputum Cx: moderate  P. mirabilis w/ ESBL-Now on Meropenem-Day #3  - CXR noted    Problem/Recommendation - 1:  ·  Acute respiratory failure with hypoxia.   Problem/Recommendation - 2:  ·  Severe asthma.   Problem/Recommendation - 3:  ·  Dyspnea.   Problem/Recommendation - 4:  ·  Bronchiectasis.   Problem/Recommendation - 5:  ·  Parainfluenza infection.   Problem/Recommendation - 6:  ·  Tracheobronchomalacia.

## 2023-05-19 NOTE — PROGRESS NOTE ADULT - ASSESSMENT
71 y/o Female with h/o TIA, DVT, Colorectal CA s/p tx, Tracheobronchomalacia s/p tracheobronchoplasty 2016, adrenal insufficiency on steroids, COPD/Asthma, Afib/AC, T2DM was admitted on 5/4 from NH for fever x 2 days. At the NH she had PICC line placed and started on meropenem empirically 2 days PTA, but continues to have fever and worsening productive cough and SOB. She endorses dry heaves and vomiting  last night, denies any diarrhea/abd pain. In ER, pt found to be hypotensive and received vancomycin IV.     1. Acute on chronic respiratory failure resolving. Acute bronchiolitis with PRMI-ESBL. Allergy to PCN with anaphylaxis.   -respiratory improving  -leukolcytosis  -sputum c/s noted  -new PRMI reported on recent sputum culture  -respiratory she remains frail  -on meropenem 1 gm IV q8h # 4  -tolerating abx well so far; no side effects noted  -monitor closely in donnie of PCN allergy history  -respiratory care  -pulmonary evaluation appreciated  -continue abx coverage   -monitor temps  -f/u CBC  -supportive care  2. Other issues:   -care per medicine    d/w Dr. Whitfield and pulmonary NP

## 2023-05-20 LAB
GLUCOSE BLDC GLUCOMTR-MCNC: 128 MG/DL — HIGH (ref 70–99)
GLUCOSE BLDC GLUCOMTR-MCNC: 311 MG/DL — HIGH (ref 70–99)
GLUCOSE BLDC GLUCOMTR-MCNC: 317 MG/DL — HIGH (ref 70–99)
GLUCOSE BLDC GLUCOMTR-MCNC: 432 MG/DL — HIGH (ref 70–99)

## 2023-05-20 PROCEDURE — 99233 SBSQ HOSP IP/OBS HIGH 50: CPT

## 2023-05-20 PROCEDURE — 99232 SBSQ HOSP IP/OBS MODERATE 35: CPT

## 2023-05-20 RX ORDER — INSULIN GLARGINE 100 [IU]/ML
45 INJECTION, SOLUTION SUBCUTANEOUS AT BEDTIME
Refills: 0 | Status: DISCONTINUED | OUTPATIENT
Start: 2023-05-20 | End: 2023-05-22

## 2023-05-20 RX ADMIN — FLUCONAZOLE 100 MILLIGRAM(S): 150 TABLET ORAL at 09:45

## 2023-05-20 RX ADMIN — INSULIN GLARGINE 10 UNIT(S): 100 INJECTION, SOLUTION SUBCUTANEOUS at 09:44

## 2023-05-20 RX ADMIN — MEXILETINE HYDROCHLORIDE 200 MILLIGRAM(S): 150 CAPSULE ORAL at 13:08

## 2023-05-20 RX ADMIN — MAGNESIUM OXIDE 400 MG ORAL TABLET 400 MILLIGRAM(S): 241.3 TABLET ORAL at 09:44

## 2023-05-20 RX ADMIN — PANTOPRAZOLE SODIUM 40 MILLIGRAM(S): 20 TABLET, DELAYED RELEASE ORAL at 09:45

## 2023-05-20 RX ADMIN — Medication 4: at 22:27

## 2023-05-20 RX ADMIN — MEROPENEM 100 MILLIGRAM(S): 1 INJECTION INTRAVENOUS at 13:07

## 2023-05-20 RX ADMIN — Medication 8: at 18:27

## 2023-05-20 RX ADMIN — MEROPENEM 100 MILLIGRAM(S): 1 INJECTION INTRAVENOUS at 05:08

## 2023-05-20 RX ADMIN — INSULIN GLARGINE 45 UNIT(S): 100 INJECTION, SOLUTION SUBCUTANEOUS at 22:26

## 2023-05-20 RX ADMIN — APIXABAN 5 MILLIGRAM(S): 2.5 TABLET, FILM COATED ORAL at 09:44

## 2023-05-20 RX ADMIN — Medication 500000 UNIT(S): at 13:07

## 2023-05-20 RX ADMIN — MEROPENEM 100 MILLIGRAM(S): 1 INJECTION INTRAVENOUS at 22:26

## 2023-05-20 RX ADMIN — POLYETHYLENE GLYCOL 3350 17 GRAM(S): 17 POWDER, FOR SOLUTION ORAL at 09:44

## 2023-05-20 RX ADMIN — Medication 30 MILLILITER(S): at 05:09

## 2023-05-20 RX ADMIN — Medication 500000 UNIT(S): at 23:17

## 2023-05-20 RX ADMIN — Medication 4 MILLILITER(S): at 18:59

## 2023-05-20 RX ADMIN — Medication 3 MILLILITER(S): at 18:59

## 2023-05-20 RX ADMIN — PANTOPRAZOLE SODIUM 40 MILLIGRAM(S): 20 TABLET, DELAYED RELEASE ORAL at 22:16

## 2023-05-20 RX ADMIN — Medication 20 MILLIGRAM(S): at 09:44

## 2023-05-20 RX ADMIN — MONTELUKAST 10 MILLIGRAM(S): 4 TABLET, CHEWABLE ORAL at 22:16

## 2023-05-20 RX ADMIN — MAGNESIUM OXIDE 400 MG ORAL TABLET 400 MILLIGRAM(S): 241.3 TABLET ORAL at 18:27

## 2023-05-20 RX ADMIN — APIXABAN 5 MILLIGRAM(S): 2.5 TABLET, FILM COATED ORAL at 22:16

## 2023-05-20 RX ADMIN — Medication 30 MILLILITER(S): at 19:52

## 2023-05-20 RX ADMIN — Medication 500000 UNIT(S): at 18:27

## 2023-05-20 RX ADMIN — Medication 30 MILLILITER(S): at 23:59

## 2023-05-20 RX ADMIN — Medication 12: at 13:07

## 2023-05-20 RX ADMIN — Medication 500000 UNIT(S): at 05:08

## 2023-05-20 RX ADMIN — MEXILETINE HYDROCHLORIDE 200 MILLIGRAM(S): 150 CAPSULE ORAL at 05:08

## 2023-05-20 RX ADMIN — MEXILETINE HYDROCHLORIDE 200 MILLIGRAM(S): 150 CAPSULE ORAL at 22:16

## 2023-05-20 NOTE — PROGRESS NOTE ADULT - SUBJECTIVE AND OBJECTIVE BOX
Subjective:    Awake, alert. Comfortable at rest. Coughed up mucus plug last PM.    MEDICATIONS  (STANDING):  acetylcysteine 10%  Inhalation 4 milliLiter(s) Inhalation three times a day  albuterol/ipratropium for Nebulization 3 milliLiter(s) Nebulizer every 6 hours  apixaban 5 milliGRAM(s) Oral two times a day  buDESOnide    Inhalation Suspension 0.5 milliGRAM(s) Inhalation every 12 hours  dextrose 5%. 1000 milliLiter(s) (100 mL/Hr) IV Continuous <Continuous>  dextrose 5%. 1000 milliLiter(s) (50 mL/Hr) IV Continuous <Continuous>  dextrose 50% Injectable 25 Gram(s) IV Push once  dextrose 50% Injectable 12.5 Gram(s) IV Push once  dextrose 50% Injectable 25 Gram(s) IV Push once  fluconAZOLE   Tablet 100 milliGRAM(s) Oral daily  glucagon  Injectable 1 milliGRAM(s) IntraMuscular once  insulin glargine Injectable (LANTUS) 10 Unit(s) SubCutaneous every morning  insulin glargine Injectable (LANTUS) 45 Unit(s) SubCutaneous at bedtime  insulin lispro (ADMELOG) corrective regimen sliding scale   SubCutaneous at bedtime  insulin lispro (ADMELOG) corrective regimen sliding scale   SubCutaneous three times a day before meals  magnesium oxide 400 milliGRAM(s) Oral two times a day with meals  meropenem  IVPB 1000 milliGRAM(s) IV Intermittent every 8 hours  mexiletine 200 milliGRAM(s) Oral every 8 hours  montelukast 10 milliGRAM(s) Oral at bedtime  nystatin    Suspension 429270 Unit(s) Oral every 6 hours  pantoprazole    Tablet 40 milliGRAM(s) Oral two times a day  polyethylene glycol 3350 17 Gram(s) Oral daily  predniSONE   Tablet 30 milliGRAM(s) Oral daily    MEDICATIONS  (PRN):  acetaminophen     Tablet .. 650 milliGRAM(s) Oral every 6 hours PRN Temp greater or equal to 38C (100.4F), Mild Pain (1 - 3)  albuterol    90 MICROgram(s) HFA Inhaler 2 Puff(s) Inhalation every 4 hours PRN Shortness of Breath  aluminum hydroxide/magnesium hydroxide/simethicone Suspension 30 milliLiter(s) Oral every 4 hours PRN Dyspepsia  dextrose Oral Gel 15 Gram(s) Oral once PRN Blood Glucose LESS THAN 70 milliGRAM(s)/deciliter  guaifenesin/dextromethorphan Oral Liquid 10 milliLiter(s) Oral every 4 hours PRN Cough  melatonin 3 milliGRAM(s) Oral at bedtime PRN Insomnia  ondansetron Injectable 4 milliGRAM(s) IV Push every 8 hours PRN Nausea and/or Vomiting  simethicone 80 milliGRAM(s) Chew three times a day PRN Gas      Allergies    iodine (Short breath; Swelling)  Avelox (Short breath; Pruritus)  shellfish (Anaphylaxis)  aspirin (Short breath)  Dilaudid (Short breath)  codeine (Short breath)  cefepime (Anaphylaxis)  penicillin (Anaphylaxis)  tetanus toxoid (Short breath)  Valium (Short breath)    Intolerances        Vital Signs Last 24 Hrs  T(C): 36.4 (20 May 2023 08:38), Max: 36.8 (19 May 2023 23:35)  T(F): 97.5 (20 May 2023 08:38), Max: 98.2 (19 May 2023 23:35)  HR: 67 (20 May 2023 08:38) (67 - 88)  BP: 103/69 (20 May 2023 08:38) (103/69 - 130/65)  BP(mean): --  RR: 18 (20 May 2023 08:38) (18 - 18)  SpO2: 100% (20 May 2023 08:38) (96% - 100%)    Parameters below as of 20 May 2023 08:38  Patient On (Oxygen Delivery Method): room air        PHYSICAL EXAMINATION:    NECK:  Supple. No lymphadenopathy. Jugular venous pressure not elevated.   HEART:   The cardiac impulse has a normal quality. Reg., Nl S1 and S2.    CHEST:  Chest with scattered wheezes/rhonchi. Normal respiratory effort.  ABDOMEN:  Soft and nontender.   EXTREMITIES:  There is tr edema.       LABS:                        12.1   16.70 )-----------( 172      ( 19 May 2023 07:52 )             39.7     05-19    138  |  107  |  26<H>  ----------------------------<  352<H>  4.8   |  26  |  0.76    Ca    8.6      19 May 2023 07:52            RADIOLOGY & ADDITIONAL TESTS:    Assessment/Plan    - Decrease prednisone to 30 mg QD.   - Aerosols with Duoneb/Budesonide  - Mucomyst 10% TID  - Chest PT/Smart Vest  - Singulair  - Monitor O2 Sats  - OOB to chair  - Sputum Cx: moderate  P. mirabilis w/ ESBL-Now on Meropenem-Day #4      Problem/Recommendation - 1:  ·  Acute respiratory failure with hypoxia.   Problem/Recommendation - 2:  ·  Severe asthma.   Problem/Recommendation - 3:  ·  Dyspnea.   Problem/Recommendation - 4:  ·  Bronchiectasis.   Problem/Recommendation - 5:  ·  Parainfluenza infection.   Problem/Recommendation - 6:  ·  Tracheobronchomalacia.

## 2023-05-20 NOTE — PROGRESS NOTE ADULT - ASSESSMENT
72/F admitted with :    #Sepsis as evidenced by hypotension, fever : No PNA  #Parainfluenza Bronchiolitis  # Hx of Bronchiectasis  # Oral thrush  #Asthma exacerbation  - ICU eval for hypotension appreciated; BP responded to iv fluids  - duonebs, alb inh prn  - pulm cs appreciated   - ID cs appreciated   - S/P  meropenem/vanco  - antitussives  - solumedrol 20 mg q 8 hrs- oral prednisone 20mg BID  diflucan  smart vest for chest PTx  sputum gram stain +, Sputum Cx grew moderate  P. mirabilis and ESBL Started on IV meropenem , cont till 5/22  Pulse ox on ambulation WNL    # C/o Abd Gas, belching  -PPI BID   -maalox  -lots of air in the colostomy bag  -patient requesting GI consult. - trial of lactose free diet     #T2DM - uncontrolled 2ndry to steroids   - resume home dose insulin  - increase lantus 45 QHS and 10 in AM  - ISS  - monitor FS  changed diet to [facility] diet but refusing it and asking for regular diet.   Glucerna 1 can  tid    #Parox Afib, TIA  - c/w eliquis    # DVT PPX: on Eliquis      poc discussed with pt, team, Dr. Wilian MEHTA and advance care planning meeting done with the patient. She wishes to be fully resuscitated and mechanically ventilated if need arises. There are no limitations of any sort in her medical care and management. She is FULL CODE.    Dispo - cont Meropenem till 5/22

## 2023-05-20 NOTE — PROGRESS NOTE ADULT - SUBJECTIVE AND OBJECTIVE BOX
HOSPITALIST PROGRESS NOTE:  SUBJECTIVE:  PCP:  Chief Complaint: Patient is a 72y old  Female who presents with a chief complaint of fever (15 May 2023 10:15)      HPI:  HPI:  72F w/PMH TIA, DVT, Colorectal CA s/p tx, Tracheobronchomalacia s/p Tracheobronchoplasty 2016 (no further evidence), adrenal insufficiency on steroids, COPD/Asthma, Afib/AC, T2DM, recent admit 2 mos ago,  presents from NH for fever x2 days.  She had Picc line placed and started on meropenem empirically 2 days ago, but continues to have fever and worsening productive cough and sob. Pt is AAOx3 and gives her own hx.  She endorses dry heaves and vomiting  last night, denies any diarrhea/abd pain.  At time of my exam, pt found to be hypotensive on repeat bp checks.      In ED, MD d/w ID, rec to remove picc line (to be done by ED), given vanco iV, 1L ivf, duoneb, +febrile, CT chest: Increased infectious or inflammatory bronchitis/bronchiolitis since   4/5/2023.    5/18: Pt seen and examined. no new complaints. states SOB improving slowly. +cough. no fevers. sugars improved today but still elevated.     5/19: feeling much better  cont meropenem till 5/22 5/20: feeling better  FS uncontrolled; increase lantus to 45 u at hs    Allergies:  iodine (Short breath; Swelling)  Avelox (Short breath; Pruritus)  shellfish (Anaphylaxis)  aspirin (Short breath)  Dilaudid (Short breath)  codeine (Short breath)  cefepime (Anaphylaxis)  penicillin (Anaphylaxis)  tetanus toxoid (Short breath)  Valium (Short breath)    REVIEW OF SYSTEMS:  See HPI. All other review of systems is negative unless indicated above.     OBJECTIVE  Physical Exam:      PHYSICAL EXAM:    Vital Signs Last 24 Hrs  T(C): 36.4 (20 May 2023 08:38), Max: 36.8 (19 May 2023 23:35)  T(F): 97.5 (20 May 2023 08:38), Max: 98.2 (19 May 2023 23:35)  HR: 67 (20 May 2023 08:38) (67 - 88)  BP: 103/69 (20 May 2023 08:38) (103/69 - 130/65)  BP(mean): --  RR: 18 (20 May 2023 08:38) (18 - 18)  SpO2: 100% (20 May 2023 08:38) (96% - 100%)    Parameters below as of 20 May 2023 08:38  Patient On (Oxygen Delivery Method): room air      Constitutional: Well appearing  HEENT: Atraumatic, EBER, Normal, No congestion  Respiratory: Breath Sounds normal, no rhonchi/wheeze  Cardiovascular: N S1S2;   Gastrointestinal: Abdomen soft, non tender, Bowel Sounds present  Extremities: No edema, peripheral pulses present  Neurological: AAO x 3, no gross focal motor deficits  Skin: Non cellulitic, no rash, ulcers  Lymph Nodes: No lymphadenopathy noted  Back: No CVA tenderness   Musculoskeletal: non tender  Breasts: Deferred  Genitourinary: deferred  Rectal: Deferred    All Labs/EKG/Radiology/Meds reviewed    Lab Results:  CBC  CBC Full  -  ( 19 May 2023 07:52 )  WBC Count : 16.70 K/uL  RBC Count : 5.47 M/uL  Hemoglobin : 12.1 g/dL  Hematocrit : 39.7 %  Platelet Count - Automated : 172 K/uL  Mean Cell Volume : 72.6 fl  Mean Cell Hemoglobin : 22.1 pg  Mean Cell Hemoglobin Concentration : 30.5 gm/dL  Auto Neutrophil # : x  Auto Lymphocyte # : x  Auto Monocyte # : x  Auto Eosinophil # : x  Auto Basophil # : x  Auto Neutrophil % : x  Auto Lymphocyte % : x  Auto Monocyte % : x  Auto Eosinophil % : x  Auto Basophil % : x    .		Differential:	[] Automated		[] Manual  Chemistry  05-19    138  |  107  |  26<H>  ----------------------------<  352<H>  4.8   |  26  |  0.76    Ca    8.6      19 May 2023 07:52  Phos  3.3     05-18  Mg     2.3     05-18 05-18-23 @ 07:01  -  05-19-23 @ 07:00  --------------------------------------------------------  IN: 50 mL / OUT: 0 mL / NET: 50 mL    05-19-23 @ 07:01  -  05-19-23 @ 16:09  --------------------------------------------------------  IN: 50 mL / OUT: 0 mL / NET: 50 mL      MICROBIOLOGY/CULTURES:  Culture Results:   Numerous Proteus mirabilis ESBL  Normal Respiratory Jessica present (05-13 @ 15:02)          MICROBIOLOGY/CULTURES:  Culture Results:   Numerous Proteus mirabilis ESBL  Normal Respiratory Jessica present (05-13 @ 15:02)      RADIOLOGY/EKG:      < from: Xray Chest 1 View- PORTABLE-Urgent (Xray Chest 1 View- PORTABLE-Urgent .) (05.04.23 @ 10:47) >    IMPRESSION: Persistent small density right base laterally.    < end of copied text >  < from: CT Chest No Cont (05.04.23 @ 13:36) >    IMPRESSION:    Increased infectious or inflammatory bronchitis/bronchiolitis since   4/5/2023.    < end of copied text >    MEDICATIONS  (STANDING):  acetylcysteine 10%  Inhalation 4 milliLiter(s) Inhalation three times a day  albuterol/ipratropium for Nebulization 3 milliLiter(s) Nebulizer every 6 hours  apixaban 5 milliGRAM(s) Oral two times a day  buDESOnide    Inhalation Suspension 0.5 milliGRAM(s) Inhalation every 12 hours  dextrose 5%. 1000 milliLiter(s) (100 mL/Hr) IV Continuous <Continuous>  dextrose 5%. 1000 milliLiter(s) (50 mL/Hr) IV Continuous <Continuous>  dextrose 50% Injectable 25 Gram(s) IV Push once  dextrose 50% Injectable 12.5 Gram(s) IV Push once  dextrose 50% Injectable 25 Gram(s) IV Push once  fluconAZOLE   Tablet 100 milliGRAM(s) Oral daily  glucagon  Injectable 1 milliGRAM(s) IntraMuscular once  insulin glargine Injectable (LANTUS) 10 Unit(s) SubCutaneous every morning  insulin glargine Injectable (LANTUS) 45 Unit(s) SubCutaneous at bedtime  insulin lispro (ADMELOG) corrective regimen sliding scale   SubCutaneous three times a day before meals  insulin lispro (ADMELOG) corrective regimen sliding scale   SubCutaneous at bedtime  magnesium oxide 400 milliGRAM(s) Oral two times a day with meals  meropenem  IVPB 1000 milliGRAM(s) IV Intermittent every 8 hours  mexiletine 200 milliGRAM(s) Oral every 8 hours  montelukast 10 milliGRAM(s) Oral at bedtime  nystatin    Suspension 834874 Unit(s) Oral every 6 hours  pantoprazole    Tablet 40 milliGRAM(s) Oral two times a day  polyethylene glycol 3350 17 Gram(s) Oral daily  predniSONE   Tablet 20 milliGRAM(s) Oral two times a day    MEDICATIONS  (PRN):  acetaminophen     Tablet .. 650 milliGRAM(s) Oral every 6 hours PRN Temp greater or equal to 38C (100.4F), Mild Pain (1 - 3)  albuterol    90 MICROgram(s) HFA Inhaler 2 Puff(s) Inhalation every 4 hours PRN Shortness of Breath  aluminum hydroxide/magnesium hydroxide/simethicone Suspension 30 milliLiter(s) Oral every 4 hours PRN Dyspepsia  dextrose Oral Gel 15 Gram(s) Oral once PRN Blood Glucose LESS THAN 70 milliGRAM(s)/deciliter  guaifenesin/dextromethorphan Oral Liquid 10 milliLiter(s) Oral every 4 hours PRN Cough  melatonin 3 milliGRAM(s) Oral at bedtime PRN Insomnia  ondansetron Injectable 4 milliGRAM(s) IV Push every 8 hours PRN Nausea and/or Vomiting  simethicone 80 milliGRAM(s) Chew three times a day PRN Gas

## 2023-05-20 NOTE — PROGRESS NOTE ADULT - SUBJECTIVE AND OBJECTIVE BOX
Date of service: 23 @ 10:14    Lying in bed in NAD  Has cough  Sputum is more clear  No fever    ROS: no fever or chills; denies dizziness, no HA, no abdominal pain, no diarrhea or constipation; no dysuria, no legs pain, no rashes    MEDICATIONS  (STANDING):  acetylcysteine 10%  Inhalation 4 milliLiter(s) Inhalation three times a day  albuterol/ipratropium for Nebulization 3 milliLiter(s) Nebulizer every 6 hours  apixaban 5 milliGRAM(s) Oral two times a day  buDESOnide    Inhalation Suspension 0.5 milliGRAM(s) Inhalation every 12 hours  dextrose 5%. 1000 milliLiter(s) (50 mL/Hr) IV Continuous <Continuous>  dextrose 5%. 1000 milliLiter(s) (100 mL/Hr) IV Continuous <Continuous>  dextrose 50% Injectable 25 Gram(s) IV Push once  dextrose 50% Injectable 12.5 Gram(s) IV Push once  dextrose 50% Injectable 25 Gram(s) IV Push once  fluconAZOLE   Tablet 100 milliGRAM(s) Oral daily  glucagon  Injectable 1 milliGRAM(s) IntraMuscular once  insulin glargine Injectable (LANTUS) 40 Unit(s) SubCutaneous at bedtime  insulin glargine Injectable (LANTUS) 10 Unit(s) SubCutaneous every morning  insulin lispro (ADMELOG) corrective regimen sliding scale   SubCutaneous three times a day before meals  insulin lispro (ADMELOG) corrective regimen sliding scale   SubCutaneous at bedtime  magnesium oxide 400 milliGRAM(s) Oral two times a day with meals  meropenem  IVPB 1000 milliGRAM(s) IV Intermittent every 8 hours  mexiletine 200 milliGRAM(s) Oral every 8 hours  montelukast 10 milliGRAM(s) Oral at bedtime  nystatin    Suspension 817865 Unit(s) Oral every 6 hours  pantoprazole    Tablet 40 milliGRAM(s) Oral two times a day  polyethylene glycol 3350 17 Gram(s) Oral daily  predniSONE   Tablet 20 milliGRAM(s) Oral two times a day    Vital Signs Last 24 Hrs  T(C): 36.4 (20 May 2023 08:38), Max: 36.8 (19 May 2023 23:35)  T(F): 97.5 (20 May 2023 08:38), Max: 98.2 (19 May 2023 23:35)  HR: 67 (20 May 2023 08:38) (67 - 88)  BP: 103/69 (20 May 2023 08:38) (103/69 - 130/65)  BP(mean): --  RR: 18 (20 May 2023 08:38) (18 - 18)  SpO2: 100% (20 May 2023 08:38) (96% - 100%)    Parameters below as of 20 May 2023 08:38  Patient On (Oxygen Delivery Method): room air     Physical exam:    Constitutional:  No acute distress  HEENT: NC/AT, EOMI, PERRLA, conjunctivae clear; ears and nose atraumatic  Neck: supple; thyroid not palpable  Back: no tenderness  Respiratory: respiratory effort normal; few crackles at bases  Cardiovascular: S1S2 regular, no murmurs  Abdomen: soft, not tender, not distended, positive BS  Genitourinary: no suprapubic tenderness  Lymphatic: no LN palpable  Musculoskeletal: no muscle tenderness, no joint swelling or tenderness  Extremities: no pedal edema  Neurological/ Psychiatric: AxOx3, judgement and insight normal; moving all extremities  Skin: no rashes; no palpable lesions    Labs: reviewed                                  12.1   16.70 )-----------( 172      ( 19 May 2023 07:52 )             39.7     05-19    138  |  107  |  26<H>  ----------------------------<  352<H>  4.8   |  26  |  0.76    Ca    8.6      19 May 2023 07:52  Phos  3.3     05-18  Mg     2.3     05-18                        11.9   12.53 )-----------( 197      ( 16 May 2023 06:23 )             40.1     05-16    140  |  107  |  24<H>  ----------------------------<  355<H>  5.0   |  29  |  0.71    Ca    8.7      16 May 2023 06:23  Phos  2.6     05-16  Mg     2.5     05-16                        11.8   8.07  )-----------( 181      ( 05 May 2023 06:21 )             42.0     05-05    137  |  107  |  12  ----------------------------<  248<H>  4.9   |  23  |  0.56    Ca    8.7      05 May 2023 06:21    TPro  7.6  /  Alb  3.2<L>  /  TBili  0.3  /  DBili  x   /  AST  27  /  ALT  21  /  AlkPhos  104  05-04     LIVER FUNCTIONS - ( 04 May 2023 09:51 )  Alb: 3.2 g/dL / Pro: 7.6 gm/dL / ALK PHOS: 104 U/L / ALT: 21 U/L / AST: 27 U/L / GGT: x           Urinalysis Basic - ( 04 May 2023 13:31 )    Color: Yellow / Appearance: Clear / S.015 / pH: x  Gluc: x / Ketone: Small  / Bili: Negative / Urobili: Negative   Blood: x / Protein: Negative / Nitrite: Negative   Leuk Esterase: Negative / RBC: 6-10 /HPF / WBC 3-5 /HPF   Sq Epi: x / Non Sq Epi: x / Bacteria: Occasional    Parainfluenza 3 ( @ 09:51)  Detected    Culture - Sputum (collected 13 May 2023 15:02)  Source: .Sputum Sputum  Gram Stain (14 May 2023 00:02):    Moderate polymorphonuclear leukocytes per low power field    No Squamous epithelial cells per low power field    Moderate Gram Negative Rods per oil power field  Final Report (15 May 2023 21:45):    Numerous Proteus mirabilis ESBL    Normal Respiratory Jessica present  Organism: Proteus mirabilis ESBL (15 May 2023 21:45)  Organism: Proteus mirabilis ESBL (15 May 2023 21:45)      Method Type: GARRETT      -  Amikacin: S <=16      -  Amoxicillin/Clavulanic Acid: S <=8/4      -  Ampicillin: R >16 These ampicillin results predict results for amoxicillin      -  Ampicillin/Sulbactam: R <=4/2 Enterobacter, Klebsiella aerogenes, Citrobacter, and Serratia may develop resistance during prolonged therapy (3-4 days)      -  Aztreonam: R >16      -  Cefazolin: R >16 Enterobacter, Klebsiella aerogenes, Citrobacter, and Serratia may develop resistance during prolonged therapy (3-4 days)      -  Cefepime: R >16      -  Ceftriaxone: R >32 Enterobacter, Klebsiella aerogenes, Citrobacter, and Serratia may develop resistance during prolonged therapy      -  Ciprofloxacin: R >2      -  Ertapenem: S <=0.5      -  Gentamicin: R >8      -  Levofloxacin: R >4      -  Meropenem: S <=1      -  Piperacillin/Tazobactam: R <=8      -  Tobramycin: R >8      -  Trimethoprim/Sulfamethoxazole: R >38    Radiology: all available radiological tests reviewed    < from: CT Chest No Cont (23 @ 13:36) >  Increased infectious or inflammatory bronchitis/bronchiolitis since 2023.  < end of copied text >    Advanced directives addressed: full resuscitation

## 2023-05-20 NOTE — PROGRESS NOTE ADULT - ASSESSMENT
71 y/o Female with h/o TIA, DVT, Colorectal CA s/p tx, Tracheobronchomalacia s/p tracheobronchoplasty 2016, adrenal insufficiency on steroids, COPD/Asthma, Afib/AC, T2DM was admitted on 5/4 from NH for fever x 2 days. At the NH she had PICC line placed and started on meropenem empirically 2 days PTA, but continues to have fever and worsening productive cough and SOB. She endorses dry heaves and vomiting  last night, denies any diarrhea/abd pain. In ER, pt found to be hypotensive and received vancomycin IV.     1. Acute on chronic respiratory failure resolving. Acute bronchiolitis with PRMI-ESBL. Allergy to PCN with anaphylaxis.   -respiratory improving  -leukocytosis, no diarrhea reported  -sputum c/s noted  -new PRMI reported on recent sputum culture  -respiratory she remains frail  -on meropenem 1 gm IV q8h # 5  -tolerating abx well so far; no side effects noted  -monitor closely in donnie of PCN allergy history  -respiratory care  -pulmonary evaluation appreciated  -continue abx coverage   -monitor temps  -f/u CBC  -supportive care  2. Other issues:   -care per medicine    d/w Dr. Whitfield and pulmonary NP

## 2023-05-21 LAB
GLUCOSE BLDC GLUCOMTR-MCNC: 250 MG/DL — HIGH (ref 70–99)
GLUCOSE BLDC GLUCOMTR-MCNC: 257 MG/DL — HIGH (ref 70–99)
GLUCOSE BLDC GLUCOMTR-MCNC: 299 MG/DL — HIGH (ref 70–99)
GLUCOSE BLDC GLUCOMTR-MCNC: 77 MG/DL — SIGNIFICANT CHANGE UP (ref 70–99)

## 2023-05-21 PROCEDURE — 99232 SBSQ HOSP IP/OBS MODERATE 35: CPT

## 2023-05-21 PROCEDURE — 99233 SBSQ HOSP IP/OBS HIGH 50: CPT

## 2023-05-21 RX ADMIN — FLUCONAZOLE 100 MILLIGRAM(S): 150 TABLET ORAL at 09:51

## 2023-05-21 RX ADMIN — Medication 500000 UNIT(S): at 13:12

## 2023-05-21 RX ADMIN — Medication 0.5 MILLIGRAM(S): at 09:32

## 2023-05-21 RX ADMIN — PANTOPRAZOLE SODIUM 40 MILLIGRAM(S): 20 TABLET, DELAYED RELEASE ORAL at 09:51

## 2023-05-21 RX ADMIN — Medication 0.5 MILLIGRAM(S): at 21:00

## 2023-05-21 RX ADMIN — Medication 6: at 13:12

## 2023-05-21 RX ADMIN — SIMETHICONE 80 MILLIGRAM(S): 80 TABLET, CHEWABLE ORAL at 13:12

## 2023-05-21 RX ADMIN — INSULIN GLARGINE 45 UNIT(S): 100 INJECTION, SOLUTION SUBCUTANEOUS at 22:39

## 2023-05-21 RX ADMIN — MONTELUKAST 10 MILLIGRAM(S): 4 TABLET, CHEWABLE ORAL at 22:39

## 2023-05-21 RX ADMIN — Medication 2: at 22:40

## 2023-05-21 RX ADMIN — Medication 3 MILLILITER(S): at 15:12

## 2023-05-21 RX ADMIN — Medication 4 MILLILITER(S): at 15:12

## 2023-05-21 RX ADMIN — Medication 4 MILLILITER(S): at 08:49

## 2023-05-21 RX ADMIN — Medication 30 MILLILITER(S): at 05:06

## 2023-05-21 RX ADMIN — Medication 500000 UNIT(S): at 05:06

## 2023-05-21 RX ADMIN — Medication 30 MILLIGRAM(S): at 09:51

## 2023-05-21 RX ADMIN — APIXABAN 5 MILLIGRAM(S): 2.5 TABLET, FILM COATED ORAL at 09:51

## 2023-05-21 RX ADMIN — MEROPENEM 100 MILLIGRAM(S): 1 INJECTION INTRAVENOUS at 22:40

## 2023-05-21 RX ADMIN — MEROPENEM 100 MILLIGRAM(S): 1 INJECTION INTRAVENOUS at 05:06

## 2023-05-21 RX ADMIN — Medication 30 MILLILITER(S): at 09:51

## 2023-05-21 RX ADMIN — MEXILETINE HYDROCHLORIDE 200 MILLIGRAM(S): 150 CAPSULE ORAL at 22:39

## 2023-05-21 RX ADMIN — PANTOPRAZOLE SODIUM 40 MILLIGRAM(S): 20 TABLET, DELAYED RELEASE ORAL at 22:39

## 2023-05-21 RX ADMIN — MAGNESIUM OXIDE 400 MG ORAL TABLET 400 MILLIGRAM(S): 241.3 TABLET ORAL at 17:28

## 2023-05-21 RX ADMIN — Medication 500000 UNIT(S): at 17:28

## 2023-05-21 RX ADMIN — Medication 4 MILLILITER(S): at 21:00

## 2023-05-21 RX ADMIN — Medication 3 MILLILITER(S): at 08:49

## 2023-05-21 RX ADMIN — APIXABAN 5 MILLIGRAM(S): 2.5 TABLET, FILM COATED ORAL at 22:39

## 2023-05-21 RX ADMIN — Medication 4: at 17:27

## 2023-05-21 RX ADMIN — Medication 3 MILLILITER(S): at 21:00

## 2023-05-21 RX ADMIN — SIMETHICONE 80 MILLIGRAM(S): 80 TABLET, CHEWABLE ORAL at 19:27

## 2023-05-21 RX ADMIN — MEROPENEM 100 MILLIGRAM(S): 1 INJECTION INTRAVENOUS at 13:13

## 2023-05-21 RX ADMIN — MEXILETINE HYDROCHLORIDE 200 MILLIGRAM(S): 150 CAPSULE ORAL at 13:12

## 2023-05-21 RX ADMIN — INSULIN GLARGINE 10 UNIT(S): 100 INJECTION, SOLUTION SUBCUTANEOUS at 09:50

## 2023-05-21 RX ADMIN — MEXILETINE HYDROCHLORIDE 200 MILLIGRAM(S): 150 CAPSULE ORAL at 05:06

## 2023-05-21 RX ADMIN — MAGNESIUM OXIDE 400 MG ORAL TABLET 400 MILLIGRAM(S): 241.3 TABLET ORAL at 09:51

## 2023-05-21 RX ADMIN — Medication 500000 UNIT(S): at 22:58

## 2023-05-21 NOTE — PROGRESS NOTE ADULT - SUBJECTIVE AND OBJECTIVE BOX
Subjective:    Awake, alert. Status quo. Ambulating in room    MEDICATIONS  (STANDING):  acetylcysteine 10%  Inhalation 4 milliLiter(s) Inhalation three times a day  albuterol/ipratropium for Nebulization 3 milliLiter(s) Nebulizer every 6 hours  apixaban 5 milliGRAM(s) Oral two times a day  buDESOnide    Inhalation Suspension 0.5 milliGRAM(s) Inhalation every 12 hours  dextrose 5%. 1000 milliLiter(s) (100 mL/Hr) IV Continuous <Continuous>  dextrose 5%. 1000 milliLiter(s) (50 mL/Hr) IV Continuous <Continuous>  dextrose 50% Injectable 25 Gram(s) IV Push once  dextrose 50% Injectable 12.5 Gram(s) IV Push once  dextrose 50% Injectable 25 Gram(s) IV Push once  fluconAZOLE   Tablet 100 milliGRAM(s) Oral daily  glucagon  Injectable 1 milliGRAM(s) IntraMuscular once  insulin glargine Injectable (LANTUS) 45 Unit(s) SubCutaneous at bedtime  insulin glargine Injectable (LANTUS) 10 Unit(s) SubCutaneous every morning  insulin lispro (ADMELOG) corrective regimen sliding scale   SubCutaneous at bedtime  insulin lispro (ADMELOG) corrective regimen sliding scale   SubCutaneous three times a day before meals  magnesium oxide 400 milliGRAM(s) Oral two times a day with meals  meropenem  IVPB 1000 milliGRAM(s) IV Intermittent every 8 hours  mexiletine 200 milliGRAM(s) Oral every 8 hours  montelukast 10 milliGRAM(s) Oral at bedtime  nystatin    Suspension 914572 Unit(s) Oral every 6 hours  pantoprazole    Tablet 40 milliGRAM(s) Oral two times a day  polyethylene glycol 3350 17 Gram(s) Oral daily  predniSONE   Tablet 30 milliGRAM(s) Oral daily    MEDICATIONS  (PRN):  acetaminophen     Tablet .. 650 milliGRAM(s) Oral every 6 hours PRN Temp greater or equal to 38C (100.4F), Mild Pain (1 - 3)  albuterol    90 MICROgram(s) HFA Inhaler 2 Puff(s) Inhalation every 4 hours PRN Shortness of Breath  aluminum hydroxide/magnesium hydroxide/simethicone Suspension 30 milliLiter(s) Oral every 4 hours PRN Dyspepsia  dextrose Oral Gel 15 Gram(s) Oral once PRN Blood Glucose LESS THAN 70 milliGRAM(s)/deciliter  guaifenesin/dextromethorphan Oral Liquid 10 milliLiter(s) Oral every 4 hours PRN Cough  melatonin 3 milliGRAM(s) Oral at bedtime PRN Insomnia  ondansetron Injectable 4 milliGRAM(s) IV Push every 8 hours PRN Nausea and/or Vomiting  simethicone 80 milliGRAM(s) Chew three times a day PRN Gas      Allergies    iodine (Short breath; Swelling)  Avelox (Short breath; Pruritus)  shellfish (Anaphylaxis)  aspirin (Short breath)  Dilaudid (Short breath)  codeine (Short breath)  cefepime (Anaphylaxis)  penicillin (Anaphylaxis)  tetanus toxoid (Short breath)  Valium (Short breath)    Intolerances        Vital Signs Last 24 Hrs  T(C): 36.3 (21 May 2023 08:15), Max: 36.6 (20 May 2023 15:45)  T(F): 97.4 (21 May 2023 08:15), Max: 97.8 (20 May 2023 15:45)  HR: 65 (21 May 2023 08:15) (65 - 77)  BP: 114/60 (21 May 2023 08:15) (114/60 - 144/67)  BP(mean): --  RR: 18 (21 May 2023 08:15) (18 - 18)  SpO2: 100% (21 May 2023 08:15) (100% - 100%)    Parameters below as of 21 May 2023 08:15  Patient On (Oxygen Delivery Method): room air        PHYSICAL EXAMINATION:    NECK:  Supple. No lymphadenopathy. Jugular venous pressure not elevated.   HEART:   The cardiac impulse has a normal quality. Reg., Nl S1 and S2.    CHEST:  Chest with scattered wheezes/rhonchi. Normal respiratory effort.  ABDOMEN:  Soft and nontender.   EXTREMITIES:  There is no edema.       LABS:                RADIOLOGY & ADDITIONAL TESTS:    Assessment/Plan    - Maintain prednisone at 30 mg QD. Patient would like switch to medrol at time of discharge.  - Aerosols with Duoneb/Budesonide  - Mucomyst 10% TID  - Chest PT/Smart Vest  - Singulair  - Monitor O2 Sats  - OOB to chair  - Sputum Cx: moderate  P. mirabilis w/ ESBL-Now on Meropenem-Day #4      Problem/Recommendation - 1:  ·  Acute respiratory failure with hypoxia.   Problem/Recommendation - 2:  ·  Severe asthma.   Problem/Recommendation - 3:  ·  Dyspnea.   Problem/Recommendation - 4:  ·  Bronchiectasis.   Problem/Recommendation - 5:  ·  Parainfluenza infection.   Problem/Recommendation - 6:  ·  Tracheobronchomalacia.

## 2023-05-21 NOTE — PROGRESS NOTE ADULT - SUBJECTIVE AND OBJECTIVE BOX
Date of service: 23 @ 10:05    Lying in bed in NAD  Alert and verbal  Has cough  Less sputum    ROS: no fever or chills; denies dizziness, no HA, no abdominal pain, no diarrhea or constipation; no dysuria, no legs pain, no rashes    MEDICATIONS  (STANDING):  acetylcysteine 10%  Inhalation 4 milliLiter(s) Inhalation three times a day  albuterol/ipratropium for Nebulization 3 milliLiter(s) Nebulizer every 6 hours  apixaban 5 milliGRAM(s) Oral two times a day  buDESOnide    Inhalation Suspension 0.5 milliGRAM(s) Inhalation every 12 hours  dextrose 5%. 1000 milliLiter(s) (100 mL/Hr) IV Continuous <Continuous>  dextrose 5%. 1000 milliLiter(s) (50 mL/Hr) IV Continuous <Continuous>  dextrose 50% Injectable 25 Gram(s) IV Push once  dextrose 50% Injectable 12.5 Gram(s) IV Push once  dextrose 50% Injectable 25 Gram(s) IV Push once  fluconAZOLE   Tablet 100 milliGRAM(s) Oral daily  glucagon  Injectable 1 milliGRAM(s) IntraMuscular once  insulin glargine Injectable (LANTUS) 10 Unit(s) SubCutaneous every morning  insulin glargine Injectable (LANTUS) 45 Unit(s) SubCutaneous at bedtime  insulin lispro (ADMELOG) corrective regimen sliding scale   SubCutaneous at bedtime  insulin lispro (ADMELOG) corrective regimen sliding scale   SubCutaneous three times a day before meals  magnesium oxide 400 milliGRAM(s) Oral two times a day with meals  meropenem  IVPB 1000 milliGRAM(s) IV Intermittent every 8 hours  mexiletine 200 milliGRAM(s) Oral every 8 hours  montelukast 10 milliGRAM(s) Oral at bedtime  nystatin    Suspension 191193 Unit(s) Oral every 6 hours  pantoprazole    Tablet 40 milliGRAM(s) Oral two times a day  polyethylene glycol 3350 17 Gram(s) Oral daily  predniSONE   Tablet 30 milliGRAM(s) Oral daily    Vital Signs Last 24 Hrs  T(C): 36.3 (21 May 2023 08:15), Max: 36.6 (20 May 2023 15:45)  T(F): 97.4 (21 May 2023 08:15), Max: 97.8 (20 May 2023 15:45)  HR: 66 (21 May 2023 09:28) (65 - 77)  BP: 114/60 (21 May 2023 08:15) (114/60 - 144/67)  BP(mean): --  RR: 18 (21 May 2023 08:15) (18 - 18)  SpO2: 100% (21 May 2023 09:28) (100% - 100%)    Parameters below as of 21 May 2023 09:28  Patient On (Oxygen Delivery Method): room air     Physical exam:    Constitutional:  No acute distress  HEENT: NC/AT, EOMI, PERRLA, conjunctivae clear; ears and nose atraumatic  Neck: supple; thyroid not palpable  Back: no tenderness  Respiratory: respiratory effort normal; few crackles at bases  Cardiovascular: S1S2 regular, no murmurs  Abdomen: soft, not tender, not distended, positive BS  Genitourinary: no suprapubic tenderness  Lymphatic: no LN palpable  Musculoskeletal: no muscle tenderness, no joint swelling or tenderness  Extremities: no pedal edema  Neurological/ Psychiatric: AxOx3, judgement and insight normal; moving all extremities  Skin: no rashes; no palpable lesions    Labs: reviewed                        12.1   16.70 )-----------( 172      ( 19 May 2023 07:52 )             39.7     05-19    138  |  107  |  26<H>  ----------------------------<  352<H>  4.8   |  26  |  0.76    Ca    8.6      19 May 2023 07:52  Phos  3.3     05-18  Mg     2.3     05-18                        11.9   12.53 )-----------( 197      ( 16 May 2023 06:23 )             40.1     05-16    140  |  107  |  24<H>  ----------------------------<  355<H>  5.0   |  29  |  0.71    Ca    8.7      16 May 2023 06:23  Phos  2.6     05-16  Mg     2.5     05-16                        11.8   8.07  )-----------( 181      ( 05 May 2023 06:21 )             42.0     05-05    137  |  107  |  12  ----------------------------<  248<H>  4.9   |  23  |  0.56    Ca    8.7      05 May 2023 06:21    TPro  7.6  /  Alb  3.2<L>  /  TBili  0.3  /  DBili  x   /  AST  27  /  ALT  21  /  AlkPhos  104  05-04     LIVER FUNCTIONS - ( 04 May 2023 09:51 )  Alb: 3.2 g/dL / Pro: 7.6 gm/dL / ALK PHOS: 104 U/L / ALT: 21 U/L / AST: 27 U/L / GGT: x           Urinalysis Basic - ( 04 May 2023 13:31 )    Color: Yellow / Appearance: Clear / S.015 / pH: x  Gluc: x / Ketone: Small  / Bili: Negative / Urobili: Negative   Blood: x / Protein: Negative / Nitrite: Negative   Leuk Esterase: Negative / RBC: 6-10 /HPF / WBC 3-5 /HPF   Sq Epi: x / Non Sq Epi: x / Bacteria: Occasional    Parainfluenza 3 ( @ 09:51)  Detected    Culture - Sputum (collected 13 May 2023 15:02)  Source: .Sputum Sputum  Gram Stain (14 May 2023 00:02):    Moderate polymorphonuclear leukocytes per low power field    No Squamous epithelial cells per low power field    Moderate Gram Negative Rods per oil power field  Final Report (15 May 2023 21:45):    Numerous Proteus mirabilis ESBL    Normal Respiratory Jessica present  Organism: Proteus mirabilis ESBL (15 May 2023 21:45)  Organism: Proteus mirabilis ESBL (15 May 2023 21:45)      Method Type: GARRETT      -  Amikacin: S <=16      -  Amoxicillin/Clavulanic Acid: S <=8/4      -  Ampicillin: R >16 These ampicillin results predict results for amoxicillin      -  Ampicillin/Sulbactam: R <=4/2 Enterobacter, Klebsiella aerogenes, Citrobacter, and Serratia may develop resistance during prolonged therapy (3-4 days)      -  Aztreonam: R >16      -  Cefazolin: R >16 Enterobacter, Klebsiella aerogenes, Citrobacter, and Serratia may develop resistance during prolonged therapy (3-4 days)      -  Cefepime: R >16      -  Ceftriaxone: R >32 Enterobacter, Klebsiella aerogenes, Citrobacter, and Serratia may develop resistance during prolonged therapy      -  Ciprofloxacin: R >2      -  Ertapenem: S <=0.5      -  Gentamicin: R >8      -  Levofloxacin: R >4      -  Meropenem: S <=1      -  Piperacillin/Tazobactam: R <=8      -  Tobramycin: R >8      -  Trimethoprim/Sulfamethoxazole: R >38    Radiology: all available radiological tests reviewed    < from: CT Chest No Cont (23 @ 13:36) >  Increased infectious or inflammatory bronchitis/bronchiolitis since 2023.  < end of copied text >    Advanced directives addressed: full resuscitation

## 2023-05-21 NOTE — PROGRESS NOTE ADULT - SUBJECTIVE AND OBJECTIVE BOX
HOSPITALIST PROGRESS NOTE:  SUBJECTIVE:  PCP:  Chief Complaint: Patient is a 72y old  Female who presents with a chief complaint of fever (15 May 2023 10:15)      HPI:  HPI:  72F w/PMH TIA, DVT, Colorectal CA s/p tx, Tracheobronchomalacia s/p Tracheobronchoplasty 2016 (no further evidence), adrenal insufficiency on steroids, COPD/Asthma, Afib/AC, T2DM, recent admit 2 mos ago,  presents from NH for fever x2 days.  She had Picc line placed and started on meropenem empirically 2 days ago, but continues to have fever and worsening productive cough and sob. Pt is AAOx3 and gives her own hx.  She endorses dry heaves and vomiting  last night, denies any diarrhea/abd pain.  At time of my exam, pt found to be hypotensive on repeat bp checks.      In ED, MD d/w ID, rec to remove picc line (to be done by ED), given vanco iV, 1L ivf, duoneb, +febrile, CT chest: Increased infectious or inflammatory bronchitis/bronchiolitis since   4/5/2023.    5/18: Pt seen and examined. no new complaints. states SOB improving slowly. +cough. no fevers. sugars improved today but still elevated.     5/19: feeling much better  cont meropenem till 5/22 5/20: feeling better  FS uncontrolled; increase lantus to 45 u at hs    5/21: feeling better  FS better; pt non compliant with diabetic diet      Allergies:  iodine (Short breath; Swelling)  Avelox (Short breath; Pruritus)  shellfish (Anaphylaxis)  aspirin (Short breath)  Dilaudid (Short breath)  codeine (Short breath)  cefepime (Anaphylaxis)  penicillin (Anaphylaxis)  tetanus toxoid (Short breath)  Valium (Short breath)    REVIEW OF SYSTEMS:  See HPI. All other review of systems is negative unless indicated above.     OBJECTIVE  Physical Exam:      PHYSICAL EXAM:    Vital Signs Last 24 Hrs  T(C): 36.3 (21 May 2023 08:15), Max: 36.6 (20 May 2023 15:45)  T(F): 97.4 (21 May 2023 08:15), Max: 97.8 (20 May 2023 15:45)  HR: 66 (21 May 2023 09:28) (65 - 77)  BP: 114/60 (21 May 2023 08:15) (114/60 - 144/67)  BP(mean): --  RR: 18 (21 May 2023 08:15) (18 - 18)  SpO2: 100% (21 May 2023 09:28) (100% - 100%)    Parameters below as of 21 May 2023 09:28  Patient On (Oxygen Delivery Method): room air          Constitutional: Well appearing  HEENT: Atraumatic, EBER, Normal, No congestion  Respiratory: Breath Sounds normal, no rhonchi/wheeze  Cardiovascular: N S1S2;   Gastrointestinal: Abdomen soft, non tender, Bowel Sounds present  Extremities: No edema, peripheral pulses present  Neurological: AAO x 3, no gross focal motor deficits  Skin: Non cellulitic, no rash, ulcers  Lymph Nodes: No lymphadenopathy noted  Back: No CVA tenderness   Musculoskeletal: non tender  Breasts: Deferred  Genitourinary: deferred  Rectal: Deferred    All Labs/EKG/Radiology/Meds reviewed    Lab Results:  CBC  CBC Full  -  ( 19 May 2023 07:52 )  WBC Count : 16.70 K/uL  RBC Count : 5.47 M/uL  Hemoglobin : 12.1 g/dL  Hematocrit : 39.7 %  Platelet Count - Automated : 172 K/uL  Mean Cell Volume : 72.6 fl  Mean Cell Hemoglobin : 22.1 pg  Mean Cell Hemoglobin Concentration : 30.5 gm/dL  Auto Neutrophil # : x  Auto Lymphocyte # : x  Auto Monocyte # : x  Auto Eosinophil # : x  Auto Basophil # : x  Auto Neutrophil % : x  Auto Lymphocyte % : x  Auto Monocyte % : x  Auto Eosinophil % : x  Auto Basophil % : x    .		Differential:	[] Automated		[] Manual  Chemistry  05-19    138  |  107  |  26<H>  ----------------------------<  352<H>  4.8   |  26  |  0.76    Ca    8.6      19 May 2023 07:52  Phos  3.3     05-18  Mg     2.3     05-18 05-18-23 @ 07:01  -  05-19-23 @ 07:00  --------------------------------------------------------  IN: 50 mL / OUT: 0 mL / NET: 50 mL    05-19-23 @ 07:01  -  05-19-23 @ 16:09  --------------------------------------------------------  IN: 50 mL / OUT: 0 mL / NET: 50 mL      MICROBIOLOGY/CULTURES:  Culture Results:   Numerous Proteus mirabilis ESBL  Normal Respiratory Jessica present (05-13 @ 15:02)          MICROBIOLOGY/CULTURES:  Culture Results:   Numerous Proteus mirabilis ESBL  Normal Respiratory Jessica present (05-13 @ 15:02)      RADIOLOGY/EKG:      < from: Xray Chest 1 View- PORTABLE-Urgent (Xray Chest 1 View- PORTABLE-Urgent .) (05.04.23 @ 10:47) >    IMPRESSION: Persistent small density right base laterally.    < end of copied text >  < from: CT Chest No Cont (05.04.23 @ 13:36) >    IMPRESSION:    Increased infectious or inflammatory bronchitis/bronchiolitis since   4/5/2023.    < end of copied text >    MEDICATIONS  (STANDING):  acetylcysteine 10%  Inhalation 4 milliLiter(s) Inhalation three times a day  albuterol/ipratropium for Nebulization 3 milliLiter(s) Nebulizer every 6 hours  apixaban 5 milliGRAM(s) Oral two times a day  buDESOnide    Inhalation Suspension 0.5 milliGRAM(s) Inhalation every 12 hours  dextrose 5%. 1000 milliLiter(s) (100 mL/Hr) IV Continuous <Continuous>  dextrose 5%. 1000 milliLiter(s) (50 mL/Hr) IV Continuous <Continuous>  dextrose 50% Injectable 25 Gram(s) IV Push once  dextrose 50% Injectable 12.5 Gram(s) IV Push once  dextrose 50% Injectable 25 Gram(s) IV Push once  fluconAZOLE   Tablet 100 milliGRAM(s) Oral daily  glucagon  Injectable 1 milliGRAM(s) IntraMuscular once  insulin glargine Injectable (LANTUS) 10 Unit(s) SubCutaneous every morning  insulin glargine Injectable (LANTUS) 45 Unit(s) SubCutaneous at bedtime  insulin lispro (ADMELOG) corrective regimen sliding scale   SubCutaneous three times a day before meals  insulin lispro (ADMELOG) corrective regimen sliding scale   SubCutaneous at bedtime  magnesium oxide 400 milliGRAM(s) Oral two times a day with meals  meropenem  IVPB 1000 milliGRAM(s) IV Intermittent every 8 hours  mexiletine 200 milliGRAM(s) Oral every 8 hours  montelukast 10 milliGRAM(s) Oral at bedtime  nystatin    Suspension 106788 Unit(s) Oral every 6 hours  pantoprazole    Tablet 40 milliGRAM(s) Oral two times a day  polyethylene glycol 3350 17 Gram(s) Oral daily  predniSONE   Tablet 20 milliGRAM(s) Oral two times a day    MEDICATIONS  (PRN):  acetaminophen     Tablet .. 650 milliGRAM(s) Oral every 6 hours PRN Temp greater or equal to 38C (100.4F), Mild Pain (1 - 3)  albuterol    90 MICROgram(s) HFA Inhaler 2 Puff(s) Inhalation every 4 hours PRN Shortness of Breath  aluminum hydroxide/magnesium hydroxide/simethicone Suspension 30 milliLiter(s) Oral every 4 hours PRN Dyspepsia  dextrose Oral Gel 15 Gram(s) Oral once PRN Blood Glucose LESS THAN 70 milliGRAM(s)/deciliter  guaifenesin/dextromethorphan Oral Liquid 10 milliLiter(s) Oral every 4 hours PRN Cough  melatonin 3 milliGRAM(s) Oral at bedtime PRN Insomnia  ondansetron Injectable 4 milliGRAM(s) IV Push every 8 hours PRN Nausea and/or Vomiting  simethicone 80 milliGRAM(s) Chew three times a day PRN Gas

## 2023-05-21 NOTE — PROGRESS NOTE ADULT - NUTRITIONAL ASSESSMENT
This patient has been assessed with a concern for Malnutrition and has been determined to have a diagnosis/diagnoses of Severe protein-calorie malnutrition.    This patient is being managed with:   Diet Regular-  No Carbonated Beverages  No Lactose  Entered: May 17 2023 12:52PM  
This patient has been assessed with a concern for Malnutrition and has been determined to have a diagnosis/diagnoses of Severe protein-calorie malnutrition.    This patient is being managed with:   Diet Regular-  Supplement Feeding Modality:  Oral  Glucerna Shake Cans or Servings Per Day:  1       Frequency:  Three Times a day  Entered: May  6 2023 12:48PM  
This patient has been assessed with a concern for Malnutrition and has been determined to have a diagnosis/diagnoses of Severe protein-calorie malnutrition.    This patient is being managed with:   Diet Regular-  Supplement Feeding Modality:  Oral  Glucerna Shake Cans or Servings Per Day:  1       Frequency:  Three Times a day  Entered: May  6 2023 12:48PM  
This patient has been assessed with a concern for Malnutrition and has been determined to have a diagnosis/diagnoses of Severe protein-calorie malnutrition.    This patient is being managed with:   Diet Regular-  No Carbonated Beverages  No Lactose  Entered: May 17 2023 12:52PM  
This patient has been assessed with a concern for Malnutrition and has been determined to have a diagnosis/diagnoses of Severe protein-calorie malnutrition.    This patient is being managed with:   Diet Regular-  No Carbonated Beverages  No Lactose  Entered: May 17 2023 12:52PM  
This patient has been assessed with a concern for Malnutrition and has been determined to have a diagnosis/diagnoses of Severe protein-calorie malnutrition.    This patient is being managed with:   Diet Regular-  Supplement Feeding Modality:  Oral  Glucerna Shake Cans or Servings Per Day:  1       Frequency:  Three Times a day  Entered: May  6 2023 12:48PM  
This patient has been assessed with a concern for Malnutrition and has been determined to have a diagnosis/diagnoses of Severe protein-calorie malnutrition.    This patient is being managed with:   Diet Regular-  No Carbonated Beverages  No Lactose  Entered: May 17 2023 12:52PM  
This patient has been assessed with a concern for Malnutrition and has been determined to have a diagnosis/diagnoses of Severe protein-calorie malnutrition.    This patient is being managed with:   Diet Regular-  No Carbonated Beverages  No Lactose  Entered: May 17 2023 12:52PM  
This patient has been assessed with a concern for Malnutrition and has been determined to have a diagnosis/diagnoses of Severe protein-calorie malnutrition.    This patient is being managed with:   Diet Regular-  Supplement Feeding Modality:  Oral  Glucerna Shake Cans or Servings Per Day:  1       Frequency:  Three Times a day  Entered: May  6 2023 12:48PM

## 2023-05-21 NOTE — PROGRESS NOTE ADULT - ASSESSMENT
71 y/o Female with h/o TIA, DVT, Colorectal CA s/p tx, Tracheobronchomalacia s/p tracheobronchoplasty 2016, adrenal insufficiency on steroids, COPD/Asthma, Afib/AC, T2DM was admitted on 5/4 from NH for fever x 2 days. At the NH she had PICC line placed and started on meropenem empirically 2 days PTA, but continues to have fever and worsening productive cough and SOB. She endorses dry heaves and vomiting  last night, denies any diarrhea/abd pain. In ER, pt found to be hypotensive and received vancomycin IV.     1. Acute on chronic respiratory failure resolving. Acute bronchiolitis with PRMI-ESBL. Allergy to PCN with anaphylaxis.   -respiratory improving  -leukocytosis, no diarrhea reported  -sputum c/s noted  -PRMI reported on recent sputum culture  -respiratory she remains frail  -on meropenem 1 gm IV q8h # 6  -tolerating abx well so far; no side effects noted  -monitor closely in donnie of PCN allergy history  -respiratory care  -pulmonary evaluation appreciated  -continue abx coverage for one more day until tomorrow  -monitor temps  -f/u CBC  -supportive care  2. Other issues:   -care per medicine    d/w Dr. Whitfield and pulmonary NP

## 2023-05-22 ENCOUNTER — TRANSCRIPTION ENCOUNTER (OUTPATIENT)
Age: 73
End: 2023-05-22

## 2023-05-22 VITALS
HEART RATE: 85 BPM | OXYGEN SATURATION: 100 % | DIASTOLIC BLOOD PRESSURE: 65 MMHG | SYSTOLIC BLOOD PRESSURE: 128 MMHG | RESPIRATION RATE: 18 BRPM | TEMPERATURE: 98 F

## 2023-05-22 LAB
GLUCOSE BLDC GLUCOMTR-MCNC: 231 MG/DL — HIGH (ref 70–99)
GLUCOSE BLDC GLUCOMTR-MCNC: 322 MG/DL — HIGH (ref 70–99)
GLUCOSE BLDC GLUCOMTR-MCNC: 77 MG/DL — SIGNIFICANT CHANGE UP (ref 70–99)

## 2023-05-22 PROCEDURE — 99233 SBSQ HOSP IP/OBS HIGH 50: CPT

## 2023-05-22 PROCEDURE — 99239 HOSP IP/OBS DSCHRG MGMT >30: CPT

## 2023-05-22 RX ORDER — FLUCONAZOLE 150 MG/1
1 TABLET ORAL
Qty: 7 | Refills: 0
Start: 2023-05-22 | End: 2023-05-28

## 2023-05-22 RX ORDER — MONTELUKAST 4 MG/1
1 TABLET, CHEWABLE ORAL
Qty: 30 | Refills: 0
Start: 2023-05-22 | End: 2023-06-20

## 2023-05-22 RX ORDER — NYSTATIN 500MM UNIT
5 POWDER (EA) MISCELLANEOUS
Qty: 140 | Refills: 0
Start: 2023-05-22 | End: 2023-05-28

## 2023-05-22 RX ADMIN — MAGNESIUM OXIDE 400 MG ORAL TABLET 400 MILLIGRAM(S): 241.3 TABLET ORAL at 17:42

## 2023-05-22 RX ADMIN — Medication 3 MILLILITER(S): at 09:03

## 2023-05-22 RX ADMIN — Medication 500000 UNIT(S): at 12:25

## 2023-05-22 RX ADMIN — MAGNESIUM OXIDE 400 MG ORAL TABLET 400 MILLIGRAM(S): 241.3 TABLET ORAL at 08:46

## 2023-05-22 RX ADMIN — SIMETHICONE 80 MILLIGRAM(S): 80 TABLET, CHEWABLE ORAL at 17:42

## 2023-05-22 RX ADMIN — POLYETHYLENE GLYCOL 3350 17 GRAM(S): 17 POWDER, FOR SOLUTION ORAL at 09:29

## 2023-05-22 RX ADMIN — Medication 500000 UNIT(S): at 17:42

## 2023-05-22 RX ADMIN — MEROPENEM 100 MILLIGRAM(S): 1 INJECTION INTRAVENOUS at 14:27

## 2023-05-22 RX ADMIN — MEXILETINE HYDROCHLORIDE 200 MILLIGRAM(S): 150 CAPSULE ORAL at 06:00

## 2023-05-22 RX ADMIN — APIXABAN 5 MILLIGRAM(S): 2.5 TABLET, FILM COATED ORAL at 09:30

## 2023-05-22 RX ADMIN — Medication 4 MILLILITER(S): at 13:51

## 2023-05-22 RX ADMIN — SIMETHICONE 80 MILLIGRAM(S): 80 TABLET, CHEWABLE ORAL at 09:29

## 2023-05-22 RX ADMIN — Medication 500000 UNIT(S): at 06:00

## 2023-05-22 RX ADMIN — MEROPENEM 100 MILLIGRAM(S): 1 INJECTION INTRAVENOUS at 06:00

## 2023-05-22 RX ADMIN — SIMETHICONE 80 MILLIGRAM(S): 80 TABLET, CHEWABLE ORAL at 02:45

## 2023-05-22 RX ADMIN — Medication 4: at 12:25

## 2023-05-22 RX ADMIN — FLUCONAZOLE 100 MILLIGRAM(S): 150 TABLET ORAL at 09:29

## 2023-05-22 RX ADMIN — PANTOPRAZOLE SODIUM 40 MILLIGRAM(S): 20 TABLET, DELAYED RELEASE ORAL at 09:30

## 2023-05-22 RX ADMIN — INSULIN GLARGINE 10 UNIT(S): 100 INJECTION, SOLUTION SUBCUTANEOUS at 08:46

## 2023-05-22 RX ADMIN — Medication 4 MILLILITER(S): at 09:03

## 2023-05-22 RX ADMIN — Medication 0.5 MILLIGRAM(S): at 09:03

## 2023-05-22 RX ADMIN — Medication 3 MILLILITER(S): at 13:51

## 2023-05-22 RX ADMIN — MEXILETINE HYDROCHLORIDE 200 MILLIGRAM(S): 150 CAPSULE ORAL at 14:28

## 2023-05-22 RX ADMIN — Medication 8: at 17:41

## 2023-05-22 RX ADMIN — Medication 30 MILLILITER(S): at 14:28

## 2023-05-22 RX ADMIN — Medication 30 MILLIGRAM(S): at 09:30

## 2023-05-22 NOTE — PROGRESS NOTE ADULT - PROVIDER SPECIALTY LIST ADULT
Hospitalist
Hospitalist
Infectious Disease
Pulmonology
Hospitalist
Infectious Disease
Pulmonology
Hospitalist
Infectious Disease
Pulmonology
Hospitalist
Pulmonology
Pulmonology
Hospitalist

## 2023-05-22 NOTE — DISCHARGE NOTE PROVIDER - HOSPITAL COURSE
PHYSICAL EXAM:    Daily     Daily     Vital Signs Last 24 Hrs  T(C): 36.4 (22 May 2023 08:08), Max: 36.9 (21 May 2023 15:40)  T(F): 97.5 (22 May 2023 08:08), Max: 98.4 (21 May 2023 15:40)  HR: 69 (22 May 2023 09:43) (67 - 80)  BP: 131/71 (22 May 2023 08:08) (107/78 - 131/71)  BP(mean): --  RR: 18 (22 May 2023 08:08) (18 - 18)  SpO2: 100% (22 May 2023 08:08) (100% - 100%)    Constitutional: Well appearing  HEENT: Atraumatic, EBER, Normal, No congestion  Respiratory: Breath Sounds normal, no rhonchi/wheeze  Cardiovascular: N S1S2;   Gastrointestinal: Abdomen soft, non tender, Bowel Sounds present, colostomy bag present  Extremities: No edema, peripheral pulses present  Neurological: AAO x 3, no gross focal motor deficits  Skin: Non cellulitic, no rash, ulcers  Lymph Nodes: No lymphadenopathy noted  Back: No CVA tenderness   Musculoskeletal: non tender  Breasts: Deferred  Genitourinary: deferred  Rectal: Deferred    72/F admitted with :    #Sepsis as evidenced by hypotension, fever : No PNA  #Parainfluenza Bronchiolitis  # Hx of Bronchiectasis  # Oral thrush  #Asthma exacerbation  - ICU eval for hypotension appreciated; BP responded to iv fluids  - duonebs, alb inh prn  - pulm cs appreciated   - ID cs appreciated   - S/P  meropenem/vanco  - antitussives  - solumedrol 20 mg q 8 hrs- oral prednisone 20mg BID; d/c home on solumedrol 24 mg x 2 days then taper down by 4 mg every 4 days  f/u with Dr. Keegan underwood  smart vest for chest PTx  sputum gram stain +, Sputum Cx grew moderate  P. mirabilis and ESBL Started on IV meropenem , cont till 5/22; completed  Pulse ox on ambulation WNL    # C/o Abd Gas, belching  -PPI BID   -maalox  -lots of air in the colostomy bag   GI consult. - trial of lactose free diet     #T2DM - uncontrolled 2ndry to steroids   - lantus 45 units at bed time + sliding scale insulin  insulin requirements might go down with steroid taper; adjust dose of lantus accordingly  f/u with PCP  pt non complaint with diet  see an endocrinologist as out pt    #[place], KAMARI  - c/w eliquis    GOC and advance care planning meeting done with the patient. She wishes to be fully resuscitated and mechanically ventilated if need arises. There are no limitations of any sort in her medical care and management. She is FULL CODE.    d/c home; Does not want to go to [facility]    time spent 45 min

## 2023-05-22 NOTE — DISCHARGE NOTE NURSING/CASE MANAGEMENT/SOCIAL WORK - PATIENT PORTAL LINK FT
You can access the FollowMyHealth Patient Portal offered by [facility] by registering at the following website: [email or web address]. By joining [employer]’s FollowMyHealth portal, you will also be able to view your health information using other applications (apps) compatible with our system.

## 2023-05-22 NOTE — DISCHARGE NOTE PROVIDER - CARE PROVIDERS DIRECT ADDRESSES
[email or web address]

## 2023-05-22 NOTE — DISCHARGE NOTE PROVIDER - NSDCMRMEDTOKEN_GEN_ALL_CORE_FT
acetylcysteine 10% inhalation solution: 2 milliliter(s) orally every 6 hours  apixaban 5 mg oral tablet: 1 tab(s) orally 2 times a day  Breo Ellipta 200 mcg-25 mcg/inh inhalation powder: 1 puff(s) inhaled once a day  budesonide 0.5 mg/2 mL inhalation suspension: 2 milliliter(s) inhaled 2 times a day  Crestor 5 mg oral tablet: 1 tab(s) orally once a day (at bedtime)  Diabetic Expectorant 100 mg/5 mL oral liquid: 10 milliliter(s) orally 4 times a day x 7 days  ferrous sulfate 325 mg (65 mg elemental iron) oral tablet: 1 tab(s) orally 2 times a day  fluconazole 100 mg oral tablet: 1 tab(s) orally once a day  HumaLOG KwikPen 100 units/mL injectable solution: 5 unit(s) subcutaneously 3 times a day (before meals) Inject as per sliding scale: 100-150 = 5 units, 151-200 = 6 units, 201-250 = 7 units, 251-300 = 8 units, 301-350 = 9 units, 351-400 = 10 units, &gt; 400 = call md  Incruse Ellipta 62.5 mcg/inh inhalation powder: 1 puff(s) inhaled every 24 hours  insulin glargine 100 units/mL subcutaneous solution: 45 unit(s) subcutaneous once a day (at bedtime)  ipratropium-albuterol 0.5 mg-2.5 mg/3 mL inhalation solution: 3 milliliter(s) by nebulizer every 4 hours as needed for  shortness of breath and/or wheezing  ipratropium-albuterol 0.5 mg-2.5 mg/3 mL inhalation solution: 3 milliliter(s) inhaled every 6 hours  magnesium oxide 400 mg oral tablet: 1 tab(s) orally 2 times a day  Medrol 8 mg oral tablet: 3 tab(s) orally once a day Medrol 24 mg orally once a day for 2 days; then 20 mg orally daily x 4 days; then taper by 4 mg every 4 days  mexiletine 200 mg oral capsule: 1 cap(s) orally every 8 hours  MiraLax oral powder for reconstitution: 17 gram(s) orally once a day  montelukast 10 mg oral tablet: 1 tab(s) orally once a day (at bedtime)  nystatin 100,000 units/mL oral suspension: 5 milliliter(s) orally every 6 hours  omeprazole 20 mg oral delayed release tablet: 1 tab(s) orally once a day  ondansetron 4 mg oral tablet: 1 tab(s) orally every 6 hours, As Needed  saccharomyces boulardii lyo 250 mg oral capsule: 1 cap(s) orally 2 times a day  Senna Plus 50 mg-8.6 mg oral tablet: 2 tab(s) orally once a day (at bedtime)  traMADol 50 mg oral tablet: 1 tab(s) orally every 8 hours, As Needed   acetylcysteine 10% inhalation solution: 2 milliliter(s) orally every 6 hours  apixaban 5 mg oral tablet: 1 tab(s) orally 2 times a day  Breo Ellipta 200 mcg-25 mcg/inh inhalation powder: 1 puff(s) inhaled once a day  budesonide 0.5 mg/2 mL inhalation suspension: 2 milliliter(s) inhaled 2 times a day  Crestor 5 mg oral tablet: 1 tab(s) orally once a day (at bedtime)  Diabetic Expectorant 100 mg/5 mL oral liquid: 10 milliliter(s) orally 4 times a day x 7 days  ferrous sulfate 325 mg (65 mg elemental iron) oral tablet: 1 tab(s) orally 2 times a day  fluconazole 100 mg oral tablet: 1 tab(s) orally once a day  glucometer: dispense one  HumaLOG KwikPen 100 units/mL injectable solution: 5 unit(s) subcutaneously 3 times a day (before meals) Inject as per sliding scale: 100-150 = 5 units, 151-200 = 6 units, 201-250 = 7 units, 251-300 = 8 units, 301-350 = 9 units, 351-400 = 10 units, &gt; 400 = call md  Incruse Ellipta 62.5 mcg/inh inhalation powder: 1 puff(s) inhaled every 24 hours  insulin glargine 100 units/mL subcutaneous solution: 45 unit(s) subcutaneous once a day (at bedtime)  insulin needles: 1 box  ipratropium-albuterol 0.5 mg-2.5 mg/3 mL inhalation solution: 3 milliliter(s) by nebulizer every 4 hours as needed for  shortness of breath and/or wheezing  ipratropium-albuterol 0.5 mg-2.5 mg/3 mL inhalation solution: 3 milliliter(s) inhaled every 6 hours  lancets: 1 bix  magnesium oxide 400 mg oral tablet: 1 tab(s) orally 2 times a day  Medrol 8 mg oral tablet: 3 tab(s) orally once a day Medrol 24 mg orally once a day for 2 days; then 20 mg orally daily x 4 days; then taper by 4 mg every 4 days  mexiletine 200 mg oral capsule: 1 cap(s) orally every 8 hours  MiraLax oral powder for reconstitution: 17 gram(s) orally once a day  montelukast 10 mg oral tablet: 1 tab(s) orally once a day (at bedtime)  nystatin 100,000 units/mL oral suspension: 5 milliliter(s) orally every 6 hours  omeprazole 20 mg oral delayed release tablet: 1 tab(s) orally once a day  ondansetron 4 mg oral tablet: 1 tab(s) orally every 6 hours, As Needed  saccharomyces boulardii lyo 250 mg oral capsule: 1 cap(s) orally 2 times a day  Senna Plus 50 mg-8.6 mg oral tablet: 2 tab(s) orally once a day (at bedtime)  test strips: 1 box  traMADol 50 mg oral tablet: 1 tab(s) orally every 8 hours, As Needed   acetylcysteine 10% inhalation solution: 2 milliliter(s) orally every 6 hours  apixaban 5 mg oral tablet: 1 tab(s) orally 2 times a day  Breo Ellipta 200 mcg-25 mcg/inh inhalation powder: 1 puff(s) inhaled once a day  budesonide 0.5 mg/2 mL inhalation suspension: 2 milliliter(s) inhaled 2 times a day  Crestor 5 mg oral tablet: 1 tab(s) orally once a day (at bedtime)  Diabetic Expectorant 100 mg/5 mL oral liquid: 10 milliliter(s) orally 4 times a day x 7 days  ferrous sulfate 325 mg (65 mg elemental iron) oral tablet: 1 tab(s) orally 2 times a day  fluconazole 100 mg oral tablet: 1 tab(s) orally once a day  glucometer: dispense one  HumaLOG KwikPen 100 units/mL injectable solution: 5 unit(s) subcutaneously 3 times a day (before meals) Inject as per sliding scale: 100-150 = 5 units, 151-200 = 6 units, 201-250 = 7 units, 251-300 = 8 units, 301-350 = 9 units, 351-400 = 10 units, &gt; 400 = call md  Incruse Ellipta 62.5 mcg/inh inhalation powder: 1 puff(s) inhaled every 24 hours  insulin glargine 100 units/mL subcutaneous solution: 45 unit(s) subcutaneous once a day (at bedtime)  insulin needles: 1 box  ipratropium-albuterol 0.5 mg-2.5 mg/3 mL inhalation solution: 3 milliliter(s) inhaled every 6 hours  lancets: 1 bix  magnesium oxide 400 mg oral tablet: 1 tab(s) orally 2 times a day  Medrol 8 mg oral tablet: 3 tab(s) orally once a day Medrol 24 mg orally once a day for 2 days; then 20 mg orally daily x 4 days; then taper by 4 mg every 4 days  mexiletine 200 mg oral capsule: 1 cap(s) orally every 8 hours  MiraLax oral powder for reconstitution: 17 gram(s) orally once a day  montelukast 10 mg oral tablet: 1 tab(s) orally once a day (at bedtime)  nystatin 100,000 units/mL oral suspension: 5 milliliter(s) orally every 6 hours  omeprazole 20 mg oral delayed release tablet: 1 tab(s) orally once a day  ondansetron 4 mg oral tablet: 1 tab(s) orally every 6 hours as needed for  nausea  saccharomyces boulardii lyo 250 mg oral capsule: 1 cap(s) orally 2 times a day  Senna Plus 50 mg-8.6 mg oral tablet: 2 tab(s) orally once a day (at bedtime)  test strips: 1 box  test strips: 1 box  traMADol 50 mg oral tablet: 1 tab(s) orally every 8 hours as needed for  moderate pain MDD: 150 mg

## 2023-05-22 NOTE — DISCHARGE NOTE PROVIDER - NSDCFUSCHEDAPPT_GEN_ALL_CORE_FT
Trevon Smith  [facility] Physician [facility]  PULMMED [address]  Scheduled Appointment: 05/25/2023    Rico Glover  [facility]  CARDIOLOGY [address]  Scheduled Appointment: 07/27/2023    Genesis Aragon  [facility]  ELECTROPH [address]  Scheduled Appointment: 07/27/2023    Eulalio Allison  [facility]  PULMMED [address]  Scheduled Appointment: 08/01/2023    [facility]  PULED [address]  Scheduled Appointment: 08/01/2023     Martin Kamara  [facility]  INTMED [address]  Scheduled Appointment: 05/24/2023    [facility]  PULMMED [address]  Scheduled Appointment: 05/25/2023    Trevon Smith  [facility]  PULMMED [address]  Scheduled Appointment: 05/25/2023    Héctor Aragonkaty  [facility]  ELECTROPH [address]  Scheduled Appointment: 06/08/2023    Rico Glover  [facility]  CARDIOLOGY [address]  Scheduled Appointment: 06/08/2023    Rico Glover  [facility]  CARDIOLOGY [address]  Scheduled Appointment: 07/27/2023    Genesis Aragon  [facility]  ELECTROPH [address]  Scheduled Appointment: 07/27/2023    Eulalio Allison  [facility]  PULMMED [address]  Scheduled Appointment: 08/01/2023    [facility]  PULMMED [address]  Scheduled Appointment: 08/01/2023

## 2023-05-22 NOTE — DISCHARGE NOTE NURSING/CASE MANAGEMENT/SOCIAL WORK - NSDCVIVACCINE_GEN_ALL_CORE_FT
COVID-19, mRNA, LNP-S, PF, 100 mcg/ 0.5 mL dose (Moderna); 06-Dec-2021 17:12; Afshan Lew (RADHA); [employer] [id number] (Exp. Date: 22-Dec-2021); IntraMuscular; Deltoid Left.; 0.25 milliLiter(s);

## 2023-05-22 NOTE — PROGRESS NOTE ADULT - REASON FOR ADMISSION
fever

## 2023-05-22 NOTE — DISCHARGE NOTE PROVIDER - NSDCCPCAREPLAN_GEN_ALL_CORE_FT
PRINCIPAL DISCHARGE DIAGNOSIS  Diagnosis: Pneumonia  Assessment and Plan of Treatment: resolved      SECONDARY DISCHARGE DIAGNOSES  Diagnosis: Fever  Assessment and Plan of Treatment: resolved    Diagnosis: DM (diabetes mellitus)  Assessment and Plan of Treatment: lantus 45 units  sliding scale insulin  lantus requirements likely to go down once steroids are tapered off  check Your blood glucose levels by finger stick 4 times daily  f/u closely with your PCP  see and endocrinologist as out pt

## 2023-05-22 NOTE — ADVANCED PRACTICE NURSE CONSULT - ASSESSMENT
This is a 72Felame admitted on 5/3/2023 and per MD H&P" w/PMH TIA, DVT, Colorectal CA s/p tx, Tracheobronchomalacia s/p Tracheobronchoplasty 2016 (no further evidence), adrenal insufficiency on steroids, COPD/Asthma, Afib/AC, T2DM, recent admit 2 mos ago,  presents from NH for fever x2 days.  She had Picc line placed and started on meropenem empirically 2 days ago, but continues to have fever and worsening productive cough and sob. Pt is AAOx3 and gives her own hx.  She endorses dry heaves and vomiting  last night, denies any diarrhea/abd pain.  At time of my exam, pt found to be hypotensive on repeat bp checks."    Consulted to assess patients skin to upper inner gluteal.   Assessed patient on 2 SW and patient sitting in chair and agreeable to me assessing her.     Patient was able to stand up by herself out of the chair   Noted to the Right Intergluteal with 1cm x 0.5cm x 0.1cm with pink wound bed and hyper and hypopigmentation to periwound. Patient noted to slide on chair and slide herself back. This wound can be classified a friction injury. Patient is able to reposition self and educated to lift herself up when repositioning instead of sliding to reduce friction. The friction can cause skin breakdown. Recommend applying Triad Cream to wound bed and periwound and can cover with a foam dressing to assist in reducing friction and pressure. On chair recommend Maintaining Air Waffle cushion for pressure redistribution.

## 2023-05-22 NOTE — DISCHARGE NOTE PROVIDER - CARE PROVIDER_API CALL
Eulalio Allison (MD)  Pulmonary Disease  [address] [facility], [address]  [place] [address]  Phone: [phone]  Fax: [phone]  Follow Up Time:     [place])  Internal Medicine  [address] [facility], [facility] [address]  [place] [address]  Phone: [phone]  Fax: [phone]  Follow Up Time:

## 2023-05-22 NOTE — DISCHARGE NOTE NURSING/CASE MANAGEMENT/SOCIAL WORK - NSDCPEFALRISK_GEN_ALL_CORE
For information on Fall & Injury Prevention, visit: [email or web address] OR  [email or web address] OR  https://www.cdc.gov/steadi/patient.html

## 2023-05-22 NOTE — DISCHARGE NOTE NURSING/CASE MANAGEMENT/SOCIAL WORK - NSDCFUADDAPPT_GEN_ALL_CORE_FT
Appointment with Eulalio Allison office NP: Trevon Smith 5/25 @1:30pm  [address] [address]  [phone]     Spoke with Yolanda - Called by Victor Hugo.

## 2023-05-22 NOTE — PROGRESS NOTE ADULT - SUBJECTIVE AND OBJECTIVE BOX
Subjective:    Awake, alert. Improved.    MEDICATIONS  (STANDING):  acetylcysteine 10%  Inhalation 4 milliLiter(s) Inhalation three times a day  albuterol/ipratropium for Nebulization 3 milliLiter(s) Nebulizer every 6 hours  apixaban 5 milliGRAM(s) Oral two times a day  buDESOnide    Inhalation Suspension 0.5 milliGRAM(s) Inhalation every 12 hours  dextrose 5%. 1000 milliLiter(s) (100 mL/Hr) IV Continuous <Continuous>  dextrose 5%. 1000 milliLiter(s) (50 mL/Hr) IV Continuous <Continuous>  dextrose 50% Injectable 25 Gram(s) IV Push once  dextrose 50% Injectable 12.5 Gram(s) IV Push once  dextrose 50% Injectable 25 Gram(s) IV Push once  fluconAZOLE   Tablet 100 milliGRAM(s) Oral daily  glucagon  Injectable 1 milliGRAM(s) IntraMuscular once  insulin glargine Injectable (LANTUS) 10 Unit(s) SubCutaneous every morning  insulin glargine Injectable (LANTUS) 45 Unit(s) SubCutaneous at bedtime  insulin lispro (ADMELOG) corrective regimen sliding scale   SubCutaneous three times a day before meals  insulin lispro (ADMELOG) corrective regimen sliding scale   SubCutaneous at bedtime  magnesium oxide 400 milliGRAM(s) Oral two times a day with meals  meropenem  IVPB 1000 milliGRAM(s) IV Intermittent every 8 hours  mexiletine 200 milliGRAM(s) Oral every 8 hours  montelukast 10 milliGRAM(s) Oral at bedtime  nystatin    Suspension 590858 Unit(s) Oral every 6 hours  pantoprazole    Tablet 40 milliGRAM(s) Oral two times a day  polyethylene glycol 3350 17 Gram(s) Oral daily  predniSONE   Tablet 30 milliGRAM(s) Oral daily    MEDICATIONS  (PRN):  acetaminophen     Tablet .. 650 milliGRAM(s) Oral every 6 hours PRN Temp greater or equal to 38C (100.4F), Mild Pain (1 - 3)  albuterol    90 MICROgram(s) HFA Inhaler 2 Puff(s) Inhalation every 4 hours PRN Shortness of Breath  aluminum hydroxide/magnesium hydroxide/simethicone Suspension 30 milliLiter(s) Oral every 4 hours PRN Dyspepsia  dextrose Oral Gel 15 Gram(s) Oral once PRN Blood Glucose LESS THAN 70 milliGRAM(s)/deciliter  guaifenesin/dextromethorphan Oral Liquid 10 milliLiter(s) Oral every 4 hours PRN Cough  melatonin 3 milliGRAM(s) Oral at bedtime PRN Insomnia  ondansetron Injectable 4 milliGRAM(s) IV Push every 8 hours PRN Nausea and/or Vomiting  simethicone 80 milliGRAM(s) Chew three times a day PRN Gas      Allergies    iodine (Short breath; Swelling)  Avelox (Short breath; Pruritus)  shellfish (Anaphylaxis)  aspirin (Short breath)  Dilaudid (Short breath)  codeine (Short breath)  cefepime (Anaphylaxis)  penicillin (Anaphylaxis)  tetanus toxoid (Short breath)  Valium (Short breath)    Intolerances        Vital Signs Last 24 Hrs  T(C): 36.4 (22 May 2023 08:08), Max: 36.9 (21 May 2023 15:40)  T(F): 97.5 (22 May 2023 08:08), Max: 98.4 (21 May 2023 15:40)  HR: 67 (22 May 2023 08:08) (67 - 80)  BP: 131/71 (22 May 2023 08:08) (107/78 - 131/71)  BP(mean): --  RR: 18 (22 May 2023 08:08) (18 - 18)  SpO2: 100% (22 May 2023 08:08) (100% - 100%)    Parameters below as of 22 May 2023 08:21  Patient On (Oxygen Delivery Method): room air        PHYSICAL EXAMINATION:    NECK:  Supple. No lymphadenopathy. Jugular venous pressure not elevated.    HEART:   The cardiac impulse has a normal quality. Reg., Nl S1 and S2.   CHEST:  Chest with scattered exp. wheezes. Improved air entry overall. Normal respiratory effort.  ABDOMEN:  Soft and nontender.   EXTREMITIES:  There is no edema.       LABS:                RADIOLOGY & ADDITIONAL TESTS:    Assessment/Plan    - Maintain prednisone at 30 mg QD. Patient would like switch to medrol at time of discharge-24MG QD. Will taper as outpatient  - Aerosols with Duoneb/Budesonide  - Mucomyst 10% TID  - Chest PT/Smart Vest  - Singulair  - Monitor O2 Sats  - OOB to chair  - Sputum Cx: moderate  P. mirabilis w/ ESBL-Now on Meropenem-Day #5  - Patient will F/U with her pulmonologist, Dr. Allison.      Problem/Recommendation - 1:  ·  Acute respiratory failure with hypoxia.   Problem/Recommendation - 2:  ·  Severe asthma.   Problem/Recommendation - 3:  ·  Dyspnea.   Problem/Recommendation - 4:  ·  Bronchiectasis.   Problem/Recommendation - 5:  ·  Parainfluenza infection.   Problem/Recommendation - 6:  ·  Tracheobronchomalacia.

## 2023-05-22 NOTE — ADVANCED PRACTICE NURSE CONSULT - RECOMMEDATIONS
Upper Inter Gluteal Cleft Right:  -Cleanse with saline  -Pat Dry   -Apply Triad cream: please note that Triad Cream will leave a residue. It is okay to not to remove as this will not impede wound healing. Apply another light coat after cleasing.   -Apply Daily and prn if soiled.     Utilize Air Waffle cushion when sitting in chair for pressure redistribution  Continue to educate patient not to slide back on a chair when repositioning herself and to lift gluteal off the mattress or chair and then position self.

## 2023-05-22 NOTE — DISCHARGE NOTE PROVIDER - NSDCFUADDAPPT_GEN_ALL_CORE_FT
Appointment with Eulalio Allison office NP: Trevon Smith 5/25 @1:30pm  [address] [address]  [phone]     Spoke with Yolanda - Called by Victor Hugo.

## 2023-05-23 ENCOUNTER — TRANSCRIPTION ENCOUNTER (OUTPATIENT)
Age: 73
End: 2023-05-23

## 2023-05-23 RX ORDER — INSULIN GLARGINE 100 [IU]/ML
45 INJECTION, SOLUTION SUBCUTANEOUS
Qty: 2 | Refills: 0
Start: 2023-05-23 | End: 2023-06-21

## 2023-05-23 RX ORDER — BUDESONIDE, MICRONIZED 100 %
2 POWDER (GRAM) MISCELLANEOUS
Qty: 0 | Refills: 0 | DISCHARGE

## 2023-05-23 RX ORDER — FERROUS SULFATE 325(65) MG
1 TABLET ORAL
Qty: 60 | Refills: 0
Start: 2023-05-23 | End: 2023-06-21

## 2023-05-23 RX ORDER — INSULIN LISPRO 100/ML
5 VIAL (ML) SUBCUTANEOUS
Qty: 2 | Refills: 0
Start: 2023-05-23 | End: 2023-06-21

## 2023-05-23 RX ORDER — UMECLIDINIUM 62.5 UG/1
1 AEROSOL, POWDER ORAL
Qty: 4 | Refills: 0
Start: 2023-05-23 | End: 2023-06-21

## 2023-05-23 RX ORDER — POLYETHYLENE GLYCOL 3350 17 G/17G
17 POWDER, FOR SOLUTION ORAL
Qty: 510 | Refills: 0
Start: 2023-05-23 | End: 2023-06-21

## 2023-05-23 RX ORDER — ONDANSETRON 8 MG/1
1 TABLET, FILM COATED ORAL
Qty: 40 | Refills: 0
Start: 2023-05-23 | End: 2023-06-01

## 2023-05-23 RX ORDER — FERROUS SULFATE 325(65) MG
1 TABLET ORAL
Refills: 0 | DISCHARGE

## 2023-05-23 RX ORDER — ROSUVASTATIN CALCIUM 5 MG/1
1 TABLET ORAL
Qty: 0 | Refills: 0 | DISCHARGE

## 2023-05-23 RX ORDER — ONDANSETRON 8 MG/1
1 TABLET, FILM COATED ORAL
Qty: 0 | Refills: 0 | DISCHARGE

## 2023-05-23 RX ORDER — FLUTICASONE FUROATE AND VILANTEROL TRIFENATATE 100; 25 UG/1; UG/1
1 POWDER RESPIRATORY (INHALATION)
Qty: 1 | Refills: 0
Start: 2023-05-23 | End: 2023-06-21

## 2023-05-23 RX ORDER — ACETYLCYSTEINE 200 MG/ML
2 VIAL (ML) MISCELLANEOUS
Refills: 0 | DISCHARGE

## 2023-05-23 RX ORDER — IPRATROPIUM/ALBUTEROL SULFATE 18-103MCG
3 AEROSOL WITH ADAPTER (GRAM) INHALATION
Refills: 0 | DISCHARGE

## 2023-05-23 RX ORDER — OMEPRAZOLE 10 MG/1
1 CAPSULE, DELAYED RELEASE ORAL
Qty: 0 | Refills: 0 | DISCHARGE

## 2023-05-23 RX ORDER — TRAMADOL HYDROCHLORIDE 50 MG/1
1 TABLET ORAL
Qty: 0 | Refills: 0 | DISCHARGE

## 2023-05-23 RX ORDER — SENNOSIDES/DOCUSATE SODIUM 8.6MG-50MG
2 TABLET ORAL
Refills: 0 | DISCHARGE

## 2023-05-23 RX ORDER — FLUTICASONE FUROATE AND VILANTEROL TRIFENATATE 100; 25 UG/1; UG/1
1 POWDER RESPIRATORY (INHALATION)
Refills: 0 | DISCHARGE

## 2023-05-23 RX ORDER — IPRATROPIUM/ALBUTEROL SULFATE 18-103MCG
3 AEROSOL WITH ADAPTER (GRAM) INHALATION
Qty: 360 | Refills: 0
Start: 2023-05-23 | End: 2023-06-21

## 2023-05-23 RX ORDER — OMEPRAZOLE 10 MG/1
1 CAPSULE, DELAYED RELEASE ORAL
Qty: 30 | Refills: 0
Start: 2023-05-23 | End: 2023-06-21

## 2023-05-23 RX ORDER — ACETYLCYSTEINE 200 MG/ML
2 VIAL (ML) MISCELLANEOUS
Qty: 240 | Refills: 0
Start: 2023-05-23 | End: 2023-06-21

## 2023-05-23 RX ORDER — APIXABAN 2.5 MG/1
1 TABLET, FILM COATED ORAL
Qty: 60 | Refills: 0
Start: 2023-05-23 | End: 2023-06-21

## 2023-05-23 RX ORDER — INSULIN LISPRO 100/ML
5 VIAL (ML) SUBCUTANEOUS
Qty: 0 | Refills: 0 | DISCHARGE

## 2023-05-23 RX ORDER — MEXILETINE HYDROCHLORIDE 150 MG/1
1 CAPSULE ORAL
Qty: 90 | Refills: 0
Start: 2023-05-23 | End: 2023-06-21

## 2023-05-23 RX ORDER — MAGNESIUM OXIDE 400 MG ORAL TABLET 241.3 MG
1 TABLET ORAL
Refills: 0 | DISCHARGE

## 2023-05-23 RX ORDER — MAGNESIUM OXIDE 400 MG ORAL TABLET 241.3 MG
1 TABLET ORAL
Qty: 60 | Refills: 0
Start: 2023-05-23 | End: 2023-06-21

## 2023-05-23 RX ORDER — SENNOSIDES/DOCUSATE SODIUM 8.6MG-50MG
2 TABLET ORAL
Qty: 60 | Refills: 0
Start: 2023-05-23 | End: 2023-06-21

## 2023-05-23 RX ORDER — TRAMADOL HYDROCHLORIDE 50 MG/1
1 TABLET ORAL
Qty: 15 | Refills: 0
Start: 2023-05-23 | End: 2023-05-27

## 2023-05-23 RX ORDER — POLYETHYLENE GLYCOL 3350 17 G/17G
17 POWDER, FOR SOLUTION ORAL
Refills: 0 | DISCHARGE

## 2023-05-23 RX ORDER — BUDESONIDE, MICRONIZED 100 %
2 POWDER (GRAM) MISCELLANEOUS
Qty: 60 | Refills: 0
Start: 2023-05-23 | End: 2023-06-21

## 2023-05-23 RX ORDER — UMECLIDINIUM 62.5 UG/1
1 AEROSOL, POWDER ORAL
Qty: 0 | Refills: 0 | DISCHARGE

## 2023-05-23 RX ORDER — ROSUVASTATIN CALCIUM 5 MG/1
1 TABLET ORAL
Qty: 30 | Refills: 0
Start: 2023-05-23 | End: 2023-06-21

## 2023-05-24 ENCOUNTER — APPOINTMENT (OUTPATIENT)
Dept: INTERNAL MEDICINE | Facility: CLINIC | Age: 73
End: 2023-05-24
Payer: MEDICARE

## 2023-05-24 ENCOUNTER — TRANSCRIPTION ENCOUNTER (OUTPATIENT)
Age: 73
End: 2023-05-24

## 2023-05-24 VITALS
WEIGHT: 131 LBS | BODY MASS INDEX: 20.52 KG/M2 | SYSTOLIC BLOOD PRESSURE: 142 MMHG | DIASTOLIC BLOOD PRESSURE: 78 MMHG | HEART RATE: 88 BPM | RESPIRATION RATE: 14 BRPM

## 2023-05-24 PROCEDURE — 99213 OFFICE O/P EST LOW 20 MIN: CPT

## 2023-05-24 RX ORDER — BUDESONIDE 0.5 MG/2ML
0.5 INHALANT ORAL TWICE DAILY
Refills: 0 | Status: DISCONTINUED | COMMUNITY
End: 2023-05-24

## 2023-05-24 RX ORDER — PREDNISONE 50 MG/1
50 TABLET ORAL
Qty: 3 | Refills: 2 | Status: DISCONTINUED | COMMUNITY
Start: 2023-03-28 | End: 2023-05-24

## 2023-05-24 RX ORDER — OMEPRAZOLE 20 MG/1
20 CAPSULE, DELAYED RELEASE ORAL DAILY
Refills: 0 | Status: DISCONTINUED | COMMUNITY
End: 2023-05-24

## 2023-05-24 RX ORDER — SULFAMETHOXAZOLE AND TRIMETHOPRIM 400; 80 MG/1; MG/1
400-80 TABLET ORAL DAILY
Refills: 0 | Status: DISCONTINUED | COMMUNITY
End: 2023-05-24

## 2023-05-24 RX ORDER — ACETYLCYSTEINE 100 MG/ML
10 SOLUTION ORAL; RESPIRATORY (INHALATION)
Qty: 270 | Refills: 1 | Status: DISCONTINUED | COMMUNITY
Start: 2023-01-25 | End: 2023-05-24

## 2023-05-24 NOTE — PHYSICAL EXAM
[Rhonchi Bilateral] : rhonchi were heard diffusely over both lungs [Normal] : normal rate, regular rhythm, normal S1 and S2 and no murmur heard

## 2023-05-24 NOTE — HISTORY OF PRESENT ILLNESS
[de-identified] : Arrived late and could not address all issues.\par Discharged 2 days ago from a prolonged hospitalization for Proteus / ESBL pneumonia.\par Feels week and is unsteady with walking. \par Home care in place; home PT to start soon. \par Needs refills of several medications. \par Feels OK, still with deep productive cough. \par

## 2023-05-24 NOTE — PLAN
[FreeTextEntry1] : F/U with pulmonary\par Refills sent\par Reinforced need for PT and assistance with gait to preclude falls\par \par 24 minutes spent in preparation of this visit, including review of previous notes and test results, interview and examination of patient, discussion of plan, arranging for appropriate testing and treatment, and documentation.\par

## 2023-05-25 ENCOUNTER — APPOINTMENT (OUTPATIENT)
Dept: PULMONOLOGY | Facility: CLINIC | Age: 73
End: 2023-05-25
Payer: MEDICARE

## 2023-05-25 ENCOUNTER — APPOINTMENT (OUTPATIENT)
Dept: PULMONOLOGY | Facility: CLINIC | Age: 73
End: 2023-05-25

## 2023-05-25 VITALS
SYSTOLIC BLOOD PRESSURE: 142 MMHG | HEART RATE: 90 BPM | RESPIRATION RATE: 16 BRPM | TEMPERATURE: 97.5 F | OXYGEN SATURATION: 97 % | WEIGHT: 140 LBS | DIASTOLIC BLOOD PRESSURE: 78 MMHG | BODY MASS INDEX: 21.97 KG/M2 | HEIGHT: 67 IN

## 2023-05-25 DIAGNOSIS — Z79.52 LONG TERM (CURRENT) USE OF SYSTEMIC STEROIDS: ICD-10-CM

## 2023-05-25 DIAGNOSIS — J96.21 ACUTE AND CHRONIC RESPIRATORY FAILURE WITH HYPOXIA: ICD-10-CM

## 2023-05-25 DIAGNOSIS — E11.9 TYPE 2 DIABETES MELLITUS WITHOUT COMPLICATIONS: ICD-10-CM

## 2023-05-25 DIAGNOSIS — Z80.3 FAMILY HISTORY OF MALIGNANT NEOPLASM OF BREAST: ICD-10-CM

## 2023-05-25 DIAGNOSIS — Z93.3 COLOSTOMY STATUS: ICD-10-CM

## 2023-05-25 DIAGNOSIS — Z96.653 PRESENCE OF ARTIFICIAL KNEE JOINT, BILATERAL: ICD-10-CM

## 2023-05-25 DIAGNOSIS — A41.9 SEPSIS, UNSPECIFIED ORGANISM: ICD-10-CM

## 2023-05-25 DIAGNOSIS — I48.0 PAROXYSMAL ATRIAL FIBRILLATION: ICD-10-CM

## 2023-05-25 DIAGNOSIS — Z91.013 ALLERGY TO SEAFOOD: ICD-10-CM

## 2023-05-25 DIAGNOSIS — E86.0 DEHYDRATION: ICD-10-CM

## 2023-05-25 DIAGNOSIS — E43 UNSPECIFIED SEVERE PROTEIN-CALORIE MALNUTRITION: ICD-10-CM

## 2023-05-25 DIAGNOSIS — J45.901 UNSPECIFIED ASTHMA WITH (ACUTE) EXACERBATION: ICD-10-CM

## 2023-05-25 DIAGNOSIS — R05.3 CHRONIC COUGH: ICD-10-CM

## 2023-05-25 DIAGNOSIS — Z79.899 OTHER LONG TERM (CURRENT) DRUG THERAPY: ICD-10-CM

## 2023-05-25 DIAGNOSIS — Z92.3 PERSONAL HISTORY OF IRRADIATION: ICD-10-CM

## 2023-05-25 DIAGNOSIS — Z88.8 ALLERGY STATUS TO OTHER DRUGS, MEDICAMENTS AND BIOLOGICAL SUBSTANCES: ICD-10-CM

## 2023-05-25 DIAGNOSIS — J21.8 ACUTE BRONCHIOLITIS DUE TO OTHER SPECIFIED ORGANISMS: ICD-10-CM

## 2023-05-25 DIAGNOSIS — Z88.0 ALLERGY STATUS TO PENICILLIN: ICD-10-CM

## 2023-05-25 DIAGNOSIS — E86.1 HYPOVOLEMIA: ICD-10-CM

## 2023-05-25 DIAGNOSIS — Z79.4 LONG TERM (CURRENT) USE OF INSULIN: ICD-10-CM

## 2023-05-25 DIAGNOSIS — Z85.038 PERSONAL HISTORY OF OTHER MALIGNANT NEOPLASM OF LARGE INTESTINE: ICD-10-CM

## 2023-05-25 DIAGNOSIS — Z88.7 ALLERGY STATUS TO SERUM AND VACCINE: ICD-10-CM

## 2023-05-25 DIAGNOSIS — B96.4 PROTEUS (MIRABILIS) (MORGANII) AS THE CAUSE OF DISEASES CLASSIFIED ELSEWHERE: ICD-10-CM

## 2023-05-25 DIAGNOSIS — Z79.01 LONG TERM (CURRENT) USE OF ANTICOAGULANTS: ICD-10-CM

## 2023-05-25 DIAGNOSIS — Z92.21 PERSONAL HISTORY OF ANTINEOPLASTIC CHEMOTHERAPY: ICD-10-CM

## 2023-05-25 DIAGNOSIS — Z86.73 PERSONAL HISTORY OF TRANSIENT ISCHEMIC ATTACK (TIA), AND CEREBRAL INFARCTION WITHOUT RESIDUAL DEFICITS: ICD-10-CM

## 2023-05-25 DIAGNOSIS — Z16.12 EXTENDED SPECTRUM BETA LACTAMASE (ESBL) RESISTANCE: ICD-10-CM

## 2023-05-25 DIAGNOSIS — E27.40 UNSPECIFIED ADRENOCORTICAL INSUFFICIENCY: ICD-10-CM

## 2023-05-25 DIAGNOSIS — Z90.710 ACQUIRED ABSENCE OF BOTH CERVIX AND UTERUS: ICD-10-CM

## 2023-05-25 DIAGNOSIS — Z88.5 ALLERGY STATUS TO NARCOTIC AGENT: ICD-10-CM

## 2023-05-25 DIAGNOSIS — Z88.1 ALLERGY STATUS TO OTHER ANTIBIOTIC AGENTS STATUS: ICD-10-CM

## 2023-05-25 DIAGNOSIS — B37.0 CANDIDAL STOMATITIS: ICD-10-CM

## 2023-05-25 DIAGNOSIS — Z86.718 PERSONAL HISTORY OF OTHER VENOUS THROMBOSIS AND EMBOLISM: ICD-10-CM

## 2023-05-25 PROCEDURE — 96372 THER/PROPH/DIAG INJ SC/IM: CPT

## 2023-05-25 PROCEDURE — 99214 OFFICE O/P EST MOD 30 MIN: CPT | Mod: 25

## 2023-05-25 RX ORDER — TEZEPELUMAB-EKKO 210 MG/1.9ML
210 INJECTION, SOLUTION SUBCUTANEOUS
Qty: 0 | Refills: 0 | Status: COMPLETED | OUTPATIENT
Start: 2023-05-25

## 2023-05-25 RX ADMIN — TEZEPELUMAB-EKKO 210 MG/1.91ML: 210 INJECTION, SOLUTION SUBCUTANEOUS at 00:00

## 2023-05-25 NOTE — REASON FOR VISIT
[Follow-Up] : a follow-up visit [Asthma] : asthma [Cough] : cough [Shortness of Breath] : shortness of breath [Family Member] : family member

## 2023-05-26 ENCOUNTER — NON-APPOINTMENT (OUTPATIENT)
Age: 73
End: 2023-05-26

## 2023-05-27 RX ORDER — ALBUTEROL SULFATE 90 UG/1
108 (90 BASE) INHALANT RESPIRATORY (INHALATION)
Qty: 1 | Refills: 1 | Status: ACTIVE | COMMUNITY
Start: 2023-05-26 | End: 1900-01-01

## 2023-05-28 PROBLEM — R05.3 CHRONIC COUGH: Status: ACTIVE | Noted: 2019-11-01

## 2023-05-28 NOTE — HISTORY OF PRESENT ILLNESS
[TextBox_4] : Ms. Bloom is a 72 year old female with a history of abnormal CXR/chest CT, allergic rhinitis, severe persistent asthma, bronchiectasis, GERD, COPD, recurrent PNA, PND, s/p tracheoplasty for TBM, Covid-19 infection 11/2022, and SOB presenting to the office today for a hospital follow up visit. She is accompanied by her daughter\par \par Patient was admitted to [facility] from 5/4/2023 - 5/22/2023 for parainfluenza bronchiolitis.  She was discharged to the nursing home where she is currently residing.  Patient states she feels better but reports occasional shortness of breath on exertion.  She states she is not using any supplemental oxygen and her SPO2 is above 95%.  Patient reports she has not been able to use her nebulizer medication as hospital didn't sent her home with anything.  Patient reports she is currently taking Breo and Spiriva inhalers.\par \par Patient denies fever, chills, SOB @ rest, wheezing, nasal congestion, runny nose, PND, or heartburn/reflux.\par

## 2023-05-28 NOTE — ASSESSMENT
[FreeTextEntry1] : Ms. Bloom is a 72 year old female with a history of abnormal CXR/chest CT, allergic rhinitis, severe persistent asthma, bronchiectasis, GERD, COPD, recurrent PNA, PND, s/p tracheoplasty for TBM, Covid-19 infection 11/2022, and SOB presenting to the office today for a hospital follow up visit. She is accompanied by her daughter.\par \par 1. Severe persistent asthma -(steroid dependent):\par - continue Medrol 8mg/day \par - continue DuoNeb via nebulizer, Q6H . \par - continue Budesonide 0.5% via the nebulizer BID \par - continue to use Incruse 1 inhale QAM \par - continue Breo Ellipta 200 mcg at 1 inhalation QD - rinse and gargle post use \par - Patient received Tezspire Sub-Q in the LLE; next dose in 4 weeks. \par \par 2. chronic bronchitis and mucus impaction:\par - Continue Mucomyst QiD 2cc 10% q8h\par - continue to use acapella device TID. \par - continue to use chest vest therapy BID-TID.\par - Improved based on recent inpatient Chest CT. \par \par 3. GERD\par -continue to use Protonix 40 mg before breakfast\par -continue Baclofen 10 mg q-meal\par \par Patient to follow up in 1 month with RN for next Tezspire and with Dr. Allison as scheduled for 08/01/23.\par Patient to call with further questions and concerns.\par Patient verbalizes understanding of care plan and is agreeable.

## 2023-05-28 NOTE — REVIEW OF SYSTEMS
[Fatigue] : fatigue [Cough] : cough [Sputum] : sputum [SOB on Exertion] : sob on exertion [Negative] : Psychiatric [Fever] : no fever [Recent Wt Gain (___ Lbs)] : ~T no recent weight gain [EDS] : no EDS [Chills] : no chills [Poor Appetite] : no poor appetite [Recent Wt Loss (___ Lbs)] : ~T no recent weight loss [Hemoptysis] : no hemoptysis [Chest Tightness] : no chest tightness [Frequent URIs] : no frequent URIs [Dyspnea] : no dyspnea [Pleuritic Pain] : no pleuritic pain [Wheezing] : no wheezing [A.M. Dry Mouth] : no a.m. dry mouth

## 2023-05-31 ENCOUNTER — TRANSCRIPTION ENCOUNTER (OUTPATIENT)
Age: 73
End: 2023-05-31

## 2023-05-31 ENCOUNTER — APPOINTMENT (OUTPATIENT)
Dept: PULMONOLOGY | Facility: CLINIC | Age: 73
End: 2023-05-31

## 2023-06-04 ENCOUNTER — INPATIENT (INPATIENT)
Facility: HOSPITAL | Age: 73
LOS: 11 days | Discharge: HOME | DRG: 177 | End: 2023-06-16
Attending: STUDENT IN AN ORGANIZED HEALTH CARE EDUCATION/TRAINING PROGRAM | Admitting: SURGERY
Payer: MEDICARE

## 2023-06-04 VITALS — OXYGEN SATURATION: 97 % | HEIGHT: 67 IN | HEART RATE: 87 BPM | WEIGHT: 139.99 LBS

## 2023-06-04 DIAGNOSIS — J45.51 SEVERE PERSISTENT ASTHMA WITH (ACUTE) EXACERBATION: ICD-10-CM

## 2023-06-04 DIAGNOSIS — K62.5 HEMORRHAGE OF ANUS AND RECTUM: Chronic | ICD-10-CM

## 2023-06-04 DIAGNOSIS — Z79.899 OTHER LONG TERM (CURRENT) DRUG THERAPY: ICD-10-CM

## 2023-06-04 DIAGNOSIS — I48.0 PAROXYSMAL ATRIAL FIBRILLATION: ICD-10-CM

## 2023-06-04 DIAGNOSIS — Z16.12 EXTENDED SPECTRUM BETA LACTAMASE (ESBL) RESISTANCE: ICD-10-CM

## 2023-06-04 DIAGNOSIS — Z92.21 PERSONAL HISTORY OF ANTINEOPLASTIC CHEMOTHERAPY: ICD-10-CM

## 2023-06-04 DIAGNOSIS — Z92.3 PERSONAL HISTORY OF IRRADIATION: ICD-10-CM

## 2023-06-04 DIAGNOSIS — Z79.01 LONG TERM (CURRENT) USE OF ANTICOAGULANTS: ICD-10-CM

## 2023-06-04 DIAGNOSIS — H05.352 EXOSTOSIS OF LEFT ORBIT: Chronic | ICD-10-CM

## 2023-06-04 DIAGNOSIS — Z85.038 PERSONAL HISTORY OF OTHER MALIGNANT NEOPLASM OF LARGE INTESTINE: ICD-10-CM

## 2023-06-04 DIAGNOSIS — Z98.89 OTHER SPECIFIED POSTPROCEDURAL STATES: Chronic | ICD-10-CM

## 2023-06-04 DIAGNOSIS — Z88.0 ALLERGY STATUS TO PENICILLIN: ICD-10-CM

## 2023-06-04 DIAGNOSIS — E11.65 TYPE 2 DIABETES MELLITUS WITH HYPERGLYCEMIA: ICD-10-CM

## 2023-06-04 DIAGNOSIS — Z88.8 ALLERGY STATUS TO OTHER DRUGS, MEDICAMENTS AND BIOLOGICAL SUBSTANCES: ICD-10-CM

## 2023-06-04 DIAGNOSIS — E27.40 UNSPECIFIED ADRENOCORTICAL INSUFFICIENCY: ICD-10-CM

## 2023-06-04 DIAGNOSIS — Z96.653 PRESENCE OF ARTIFICIAL KNEE JOINT, BILATERAL: Chronic | ICD-10-CM

## 2023-06-04 DIAGNOSIS — Z96.653 PRESENCE OF ARTIFICIAL KNEE JOINT, BILATERAL: ICD-10-CM

## 2023-06-04 DIAGNOSIS — J21.9 ACUTE BRONCHIOLITIS, UNSPECIFIED: ICD-10-CM

## 2023-06-04 DIAGNOSIS — T38.0X5A ADVERSE EFFECT OF GLUCOCORTICOIDS AND SYNTHETIC ANALOGUES, INITIAL ENCOUNTER: ICD-10-CM

## 2023-06-04 DIAGNOSIS — Z88.1 ALLERGY STATUS TO OTHER ANTIBIOTIC AGENTS STATUS: ICD-10-CM

## 2023-06-04 DIAGNOSIS — Z87.09 PERSONAL HISTORY OF OTHER DISEASES OF THE RESPIRATORY SYSTEM: Chronic | ICD-10-CM

## 2023-06-04 DIAGNOSIS — Z98.890 OTHER SPECIFIED POSTPROCEDURAL STATES: Chronic | ICD-10-CM

## 2023-06-04 DIAGNOSIS — Z86.718 PERSONAL HISTORY OF OTHER VENOUS THROMBOSIS AND EMBOLISM: ICD-10-CM

## 2023-06-04 DIAGNOSIS — E87.20 ACIDOSIS, UNSPECIFIED: ICD-10-CM

## 2023-06-04 DIAGNOSIS — T48.6X5A ADVERSE EFFECT OF ANTIASTHMATICS, INITIAL ENCOUNTER: ICD-10-CM

## 2023-06-04 DIAGNOSIS — Z86.73 PERSONAL HISTORY OF TRANSIENT ISCHEMIC ATTACK (TIA), AND CEREBRAL INFARCTION WITHOUT RESIDUAL DEFICITS: ICD-10-CM

## 2023-06-04 DIAGNOSIS — Z91.013 ALLERGY TO SEAFOOD: ICD-10-CM

## 2023-06-04 DIAGNOSIS — J47.9 BRONCHIECTASIS, UNCOMPLICATED: ICD-10-CM

## 2023-06-04 DIAGNOSIS — J15.6 PNEUMONIA DUE TO OTHER GRAM-NEGATIVE BACTERIA: ICD-10-CM

## 2023-06-04 DIAGNOSIS — E43 UNSPECIFIED SEVERE PROTEIN-CALORIE MALNUTRITION: ICD-10-CM

## 2023-06-04 DIAGNOSIS — Z79.4 LONG TERM (CURRENT) USE OF INSULIN: ICD-10-CM

## 2023-06-04 LAB
ALBUMIN SERPL ELPH-MCNC: 3.2 G/DL — LOW (ref 3.3–5)
ALP SERPL-CCNC: 115 U/L — SIGNIFICANT CHANGE UP (ref 40–120)
ALT FLD-CCNC: 25 U/L — SIGNIFICANT CHANGE UP (ref 12–78)
ANION GAP SERPL CALC-SCNC: 6 MMOL/L — SIGNIFICANT CHANGE UP (ref 5–17)
ANISOCYTOSIS BLD QL: SLIGHT — SIGNIFICANT CHANGE UP
AST SERPL-CCNC: 17 U/L — SIGNIFICANT CHANGE UP (ref 15–37)
BASE EXCESS BLDV CALC-SCNC: 5.6 MMOL/L — HIGH (ref -2–3)
BASOPHILS # BLD AUTO: 0 K/UL — SIGNIFICANT CHANGE UP (ref 0–0.2)
BASOPHILS NFR BLD AUTO: 0 % — SIGNIFICANT CHANGE UP (ref 0–2)
BILIRUB SERPL-MCNC: 0.2 MG/DL — SIGNIFICANT CHANGE UP (ref 0.2–1.2)
BUN SERPL-MCNC: 27 MG/DL — HIGH (ref 7–23)
CALCIUM SERPL-MCNC: 8.7 MG/DL — SIGNIFICANT CHANGE UP (ref 8.5–10.1)
CHLORIDE SERPL-SCNC: 109 MMOL/L — HIGH (ref 96–108)
CO2 SERPL-SCNC: 25 MMOL/L — SIGNIFICANT CHANGE UP (ref 22–31)
CREAT SERPL-MCNC: 0.78 MG/DL — SIGNIFICANT CHANGE UP (ref 0.5–1.3)
EGFR: 80 ML/MIN/1.73M2 — SIGNIFICANT CHANGE UP
EOSINOPHIL # BLD AUTO: 0.11 K/UL — SIGNIFICANT CHANGE UP (ref 0–0.5)
EOSINOPHIL NFR BLD AUTO: 1 % — SIGNIFICANT CHANGE UP (ref 0–6)
GLUCOSE SERPL-MCNC: 260 MG/DL — HIGH (ref 70–99)
HCO3 BLDV-SCNC: 29 MMOL/L — SIGNIFICANT CHANGE UP (ref 22–29)
HCT VFR BLD CALC: 41.6 % — SIGNIFICANT CHANGE UP (ref 34.5–45)
HGB BLD-MCNC: 12.8 G/DL — SIGNIFICANT CHANGE UP (ref 11.5–15.5)
HYPOCHROMIA BLD QL: SLIGHT — SIGNIFICANT CHANGE UP
LACTATE SERPL-SCNC: 2.5 MMOL/L — HIGH (ref 0.7–2)
LACTATE SERPL-SCNC: 3.3 MMOL/L — HIGH (ref 0.7–2)
LYMPHOCYTES # BLD AUTO: 1.76 K/UL — SIGNIFICANT CHANGE UP (ref 1–3.3)
LYMPHOCYTES # BLD AUTO: 16 % — SIGNIFICANT CHANGE UP (ref 13–44)
MANUAL SMEAR VERIFICATION: SIGNIFICANT CHANGE UP
MCHC RBC-ENTMCNC: 22.3 PG — LOW (ref 27–34)
MCHC RBC-ENTMCNC: 30.8 GM/DL — LOW (ref 32–36)
MCV RBC AUTO: 72.6 FL — LOW (ref 80–100)
METAMYELOCYTES # FLD: 2 % — HIGH (ref 0–0)
MICROCYTES BLD QL: SIGNIFICANT CHANGE UP
MONOCYTES # BLD AUTO: 0.77 K/UL — SIGNIFICANT CHANGE UP (ref 0–0.9)
MONOCYTES NFR BLD AUTO: 7 % — SIGNIFICANT CHANGE UP (ref 2–14)
MYELOCYTES NFR BLD: 3 % — HIGH (ref 0–0)
NEUTROPHILS # BLD AUTO: 7.82 K/UL — HIGH (ref 1.8–7.4)
NEUTROPHILS NFR BLD AUTO: 71 % — SIGNIFICANT CHANGE UP (ref 43–77)
NRBC # BLD: 0 /100 — SIGNIFICANT CHANGE UP (ref 0–0)
NRBC # BLD: SIGNIFICANT CHANGE UP /100 WBCS (ref 0–0)
NT-PROBNP SERPL-SCNC: 238 PG/ML — HIGH (ref 0–125)
PCO2 BLDV: 36 MMHG — LOW (ref 39–42)
PH BLDV: 7.51 — HIGH (ref 7.32–7.43)
PLAT MORPH BLD: NORMAL — SIGNIFICANT CHANGE UP
PLATELET # BLD AUTO: 162 K/UL — SIGNIFICANT CHANGE UP (ref 150–400)
PO2 BLDV: 122 MMHG — HIGH (ref 25–45)
POIKILOCYTOSIS BLD QL AUTO: SIGNIFICANT CHANGE UP
POTASSIUM SERPL-MCNC: 4.4 MMOL/L — SIGNIFICANT CHANGE UP (ref 3.5–5.3)
POTASSIUM SERPL-SCNC: 4.4 MMOL/L — SIGNIFICANT CHANGE UP (ref 3.5–5.3)
PROT SERPL-MCNC: 6.6 GM/DL — SIGNIFICANT CHANGE UP (ref 6–8.3)
RAPID RVP RESULT: SIGNIFICANT CHANGE UP
RBC # BLD: 5.73 M/UL — HIGH (ref 3.8–5.2)
RBC # FLD: 23.9 % — HIGH (ref 10.3–14.5)
RBC BLD AUTO: ABNORMAL
SAO2 % BLDV: 99 % — HIGH (ref 67–88)
SARS-COV-2 RNA SPEC QL NAA+PROBE: SIGNIFICANT CHANGE UP
SCHISTOCYTES BLD QL AUTO: SLIGHT — SIGNIFICANT CHANGE UP
SODIUM SERPL-SCNC: 140 MMOL/L — SIGNIFICANT CHANGE UP (ref 135–145)
TROPONIN I, HIGH SENSITIVITY RESULT: 18.33 NG/L — SIGNIFICANT CHANGE UP
WBC # BLD: 11.02 K/UL — HIGH (ref 3.8–10.5)
WBC # FLD AUTO: 11.02 K/UL — HIGH (ref 3.8–10.5)

## 2023-06-04 PROCEDURE — 99285 EMERGENCY DEPT VISIT HI MDM: CPT

## 2023-06-04 PROCEDURE — 71045 X-RAY EXAM CHEST 1 VIEW: CPT | Mod: 26

## 2023-06-04 PROCEDURE — 71250 CT THORAX DX C-: CPT | Mod: 26,MA

## 2023-06-04 RX ORDER — IPRATROPIUM/ALBUTEROL SULFATE 18-103MCG
3 AEROSOL WITH ADAPTER (GRAM) INHALATION
Refills: 0 | Status: COMPLETED | OUTPATIENT
Start: 2023-06-04 | End: 2023-06-04

## 2023-06-04 RX ORDER — SODIUM CHLORIDE 9 MG/ML
1000 INJECTION INTRAMUSCULAR; INTRAVENOUS; SUBCUTANEOUS ONCE
Refills: 0 | Status: COMPLETED | OUTPATIENT
Start: 2023-06-04 | End: 2023-06-04

## 2023-06-04 RX ADMIN — Medication 3 MILLILITER(S): at 20:41

## 2023-06-04 RX ADMIN — Medication 125 MILLIGRAM(S): at 20:43

## 2023-06-04 RX ADMIN — Medication 3 MILLILITER(S): at 21:38

## 2023-06-04 RX ADMIN — SODIUM CHLORIDE 1000 MILLILITER(S): 9 INJECTION INTRAMUSCULAR; INTRAVENOUS; SUBCUTANEOUS at 21:38

## 2023-06-04 NOTE — ED PROVIDER NOTE - PHYSICAL EXAMINATION
Constitutional: Awake, Alert, non-toxic.  HEAD: Normocephalic, atraumatic.   EYES: PERRL, EOM intact, conjunctiva and sclera are clear bilaterally.  ENT: External ears normal. No rhinorrhea, no tracheal deviation   NECK: Supple, non-tender  CARDIOVASCULAR: regular rate and rhythm.  RESPIRATORY: Normal respiratory effort; breath sounds with wheezing and rhonci b/l. Speaking in full sentences. No accessory muscle use.   ABDOMEN: Soft; non-tender, non-distended. No rebound or guarding. ostomy in place.  MSK:  no lower extremity edema, no deformities  SKIN: Warm, dry  NEURO: A&O x3. Sensory and motor functions are grossly intact. Speech is normal. No facial droop.  PSYCH: Appearance and judgement seem appropriate for gender and age.

## 2023-06-04 NOTE — ED ADULT NURSE REASSESSMENT NOTE - NS ED NURSE REASSESS COMMENT FT1
Pt ambulation trial complete. Pt tolerated walking 50 meters with no drop of O2 saturation. Pt denies any difficulty breathing on ambulation. MD Morales made aware. Pt safely returned to room.

## 2023-06-04 NOTE — ED ADULT NURSE NOTE - NSFALLRISKINTERV_ED_ALL_ED
Assistance OOB with selected safe patient handling equipment if applicable/Assistance with ambulation/Communicate fall risk and risk factors to all staff, patient, and family/Monitor gait and stability/Provide visual cue: yellow wristband, yellow gown, etc/Reinforce activity limits and safety measures with patient and family/Call bell, personal items and telephone in reach/Instruct patient to call for assistance before getting out of bed/chair/stretcher/Non-slip footwear applied when patient is off stretcher/[place] to call system/Physically safe environment - no spills, clutter or unnecessary equipment/Purposeful Proactive Rounding/Room/bathroom lighting operational, light cord in reach

## 2023-06-04 NOTE — ED PROVIDER NOTE - CARE PROVIDER_API CALL
Eulalio Allison Parrish  Pulmonary Disease  [address] [facility] [address]  [place] [address]  Phone: [phone]  Fax: [phone]  Follow Up Time: 1-3 Days

## 2023-06-04 NOTE — ED PROVIDER NOTE - CLINICAL SUMMARY MEDICAL DECISION MAKING FREE TEXT BOX
Patient with shortness of breath and wheezing, concern for asthma exacerbation.  Recent parainfluenza virus, will check x-ray for any superimposed pneumonia.  Chest tightness related to her breathing and wheezing.  Otherwise denies any chest pain, do not suspect ACS or PE.  She is also not tachycardic.  She has no significant lower extremity edema to suggest CHF.  Plan on lab work, imaging, medications and possible admission.

## 2023-06-04 NOTE — ED ADULT NURSE NOTE - CHIEF COMPLAINT QUOTE
Patient presents to ED with daughter c/o SOB, chest tightness, weakness, and cough. Pt states she was discharged about 1 week ago from  for being admitted for 2 weeks for parainfluenza. Pt unable to fill her mucomyst prescription d/t not being available at the pharmacy. Pt denies fever/chills.
Abdominal Pain, N/V/D

## 2023-06-04 NOTE — ED PROVIDER NOTE - PROGRESS NOTE DETAILS
Patient reevaluated, feels much better at this time.  She ambulated in the ED and maintained pulse ox 97% on room air.  She was found to have a repeated lactate that was elevated.  This is likely in the setting of the albuterol use causing a lactic acidosis.  She otherwise does not have any evidence of acute infection at this time.  She is not tachycardic or febrile.  CT chest also shows improvement in findings from previous.  I discussed with patient at bedside, will plan to provide additional fluids, recheck lactate and then discharge if lactate is downtrending.  If it continues to uptrend the patient will likely need to be admitted for additional evaluation.  Daughter and patient at bedside states that they will follow-up with her pulmonologist tomorrow if discharge. lactate now 4.4. it has increased. will call hospitalist to admit to observation. will dw family. YOSELIN Berry DO spoke with Dr. Cruz pt tba to obs, , urinalysis pending, unknown reason for lactic acid elevation after 2 liters of ns when pt does not meet sepsis criteria B Kristi ETIENNE

## 2023-06-04 NOTE — ED PROVIDER NOTE - NSFOLLOWUPINSTRUCTIONS_ED_ALL_ED_FT
Shortness of Breath, Adult    Shortness of breath means you have trouble breathing. Shortness of breath could be a sign of a medical problem.    Follow these instructions at home:   •Watch for any changes in your symptoms.  • Do not use any products that contain nicotine or tobacco, such as cigarettes, e-cigarettes, and chewing tobacco.  • Do not smoke. Smoking can cause shortness of breath. If you need help to quit smoking, ask your doctor.    •Avoid things that can make it harder to breathe, such as:  •Mold.  •Dust.  •Air pollution.  •Chemical smells.  •Things that can cause allergy symptoms (allergens), if you have allergies.  •Keep your living space clean. Use products that help remove mold and dust.  •Rest as needed. Slowly return to your normal activities.    •Take over-the-counter and prescription medicines only as told by your doctor. This includes oxygen therapy and inhaled medicines.    •Keep all follow-up visits as told by your doctor. This is important.    Contact a doctor if:  •Your condition does not get better as soon as expected.  •You have a hard time doing your normal activities, even after you rest.  •You have new symptoms.    Get help right away if:  •Your shortness of breath gets worse.  •You have trouble breathing when you are resting.  •You feel light-headed or you pass out (faint).  •You have a cough that is not helped by medicines.  •You cough up blood.  •You have pain with breathing.  •You have pain in your chest, arms, shoulders, or belly (abdomen).  •You have a fever.  •You cannot walk up stairs.  •You cannot exercise the way you normally do.    These symptoms may represent a serious problem that is an emergency. Do not wait to see if the symptoms will go away. Get medical help right away. Call your local emergency services (911 in the [place] Do not drive yourself to the hospital.     Summary  •Shortness of breath is when you have trouble breathing enough air. It can be a sign of a medical problem.  •Avoid things that make it hard for you to breathe, such as smoking, pollution, mold, and dust.  •Watch for any changes in your symptoms. Contact your doctor if you do not get better or you get worse.    This information is not intended to replace advice given to you by your health care provider. Make sure you discuss any questions you have with your health care provider.    Document Revised: 05/20/2019 Document Reviewed: 05/20/2019    Elsevier Patient Education © 2022 [employer]

## 2023-06-04 NOTE — ED ADULT TRIAGE NOTE - CHIEF COMPLAINT QUOTE
Patient presents to ED with daughter c/o SOB, chest tightness, weakness, and cough. Pt states she was discharged about 1 week ago from  for being admitted for 2 weeks for parainfluenza. Pt unable to fill her mucomyst prescription d/t not being available at the pharmacy. Pt denies fever/chills.

## 2023-06-04 NOTE — ED PROVIDER NOTE - OBJECTIVE STATEMENT
Patient with a past medical history of asthma, diabetes, paroxysmal A-fib on Eliquis colorectal cancer many years ago status post colostomy is presenting with concern for shortness of breath.  States that she was recently discharged a few weeks ago from this hospital for parainfluenza.  Since being home she had done well and then 2 days ago she began to feel chest tightness with shortness of breath again.  States she has not had her Mucomyst which she feels may be contributing to her symptoms.  Denies any pleuritic chest pain.  Has been having productive cough similar to previous episodes.  Still taking steroids.

## 2023-06-04 NOTE — ED ADULT NURSE NOTE - OBJECTIVE STATEMENT
Pt comes to ED c/o SOB on ambulation and chest tightness. Pt recently DCed from  x1 week ago for parainfuenza. Pt states her mucomyst Rx is unable to be filled and has had worsened respiratory symptoms. Pt is AOx4. Pt is x1 assist to bathroom.  Pt denies CP N/V/D, headaches, dizziness, any fever or chills at home. Pt has audible productive cough. Pt is in no apparent distress and speaking in full sentences with no issues. Pt PIV placed with no issues. EKG done at bedside. Pt safety maintained.

## 2023-06-05 DIAGNOSIS — J39.8 OTHER SPECIFIED DISEASES OF UPPER RESPIRATORY TRACT: ICD-10-CM

## 2023-06-05 DIAGNOSIS — J47.9 BRONCHIECTASIS, UNCOMPLICATED: ICD-10-CM

## 2023-06-05 DIAGNOSIS — J96.01 ACUTE RESPIRATORY FAILURE WITH HYPOXIA: ICD-10-CM

## 2023-06-05 DIAGNOSIS — R06.00 DYSPNEA, UNSPECIFIED: ICD-10-CM

## 2023-06-05 DIAGNOSIS — J45.909 UNSPECIFIED ASTHMA, UNCOMPLICATED: ICD-10-CM

## 2023-06-05 DIAGNOSIS — R06.02 SHORTNESS OF BREATH: ICD-10-CM

## 2023-06-05 DIAGNOSIS — E11.10 TYPE 2 DIABETES MELLITUS WITH KETOACIDOSIS WITHOUT COMA: ICD-10-CM

## 2023-06-05 LAB
ACETONE SERPL-MCNC: NEGATIVE — SIGNIFICANT CHANGE UP
ALBUMIN SERPL ELPH-MCNC: 3.2 G/DL — LOW (ref 3.3–5)
ALP SERPL-CCNC: 114 U/L — SIGNIFICANT CHANGE UP (ref 40–120)
ALT FLD-CCNC: 25 U/L — SIGNIFICANT CHANGE UP (ref 12–78)
ANION GAP SERPL CALC-SCNC: 6 MMOL/L — SIGNIFICANT CHANGE UP (ref 5–17)
ANION GAP SERPL CALC-SCNC: 6 MMOL/L — SIGNIFICANT CHANGE UP (ref 5–17)
ANION GAP SERPL CALC-SCNC: 9 MMOL/L — SIGNIFICANT CHANGE UP (ref 5–17)
APPEARANCE UR: CLEAR — SIGNIFICANT CHANGE UP
AST SERPL-CCNC: 19 U/L — SIGNIFICANT CHANGE UP (ref 15–37)
BACTERIA # UR AUTO: NEGATIVE — SIGNIFICANT CHANGE UP
BASE EXCESS BLDA CALC-SCNC: -4.1 MMOL/L — LOW (ref -2–3)
BASOPHILS # BLD AUTO: 0.1 K/UL — SIGNIFICANT CHANGE UP (ref 0–0.2)
BASOPHILS NFR BLD AUTO: 0.9 % — SIGNIFICANT CHANGE UP (ref 0–2)
BILIRUB SERPL-MCNC: 0.3 MG/DL — SIGNIFICANT CHANGE UP (ref 0.2–1.2)
BILIRUB UR-MCNC: NEGATIVE — SIGNIFICANT CHANGE UP
BLOOD GAS COMMENTS ARTERIAL: SIGNIFICANT CHANGE UP
BUN SERPL-MCNC: 18 MG/DL — SIGNIFICANT CHANGE UP (ref 7–23)
BUN SERPL-MCNC: 20 MG/DL — SIGNIFICANT CHANGE UP (ref 7–23)
BUN SERPL-MCNC: 24 MG/DL — HIGH (ref 7–23)
CALCIUM SERPL-MCNC: 8.3 MG/DL — LOW (ref 8.5–10.1)
CALCIUM SERPL-MCNC: 8.4 MG/DL — LOW (ref 8.5–10.1)
CALCIUM SERPL-MCNC: 8.8 MG/DL — SIGNIFICANT CHANGE UP (ref 8.5–10.1)
CHLORIDE SERPL-SCNC: 108 MMOL/L — SIGNIFICANT CHANGE UP (ref 96–108)
CHLORIDE SERPL-SCNC: 109 MMOL/L — HIGH (ref 96–108)
CHLORIDE SERPL-SCNC: 112 MMOL/L — HIGH (ref 96–108)
CO2 SERPL-SCNC: 19 MMOL/L — LOW (ref 22–31)
CO2 SERPL-SCNC: 22 MMOL/L — SIGNIFICANT CHANGE UP (ref 22–31)
CO2 SERPL-SCNC: 23 MMOL/L — SIGNIFICANT CHANGE UP (ref 22–31)
COLOR SPEC: YELLOW — SIGNIFICANT CHANGE UP
CREAT SERPL-MCNC: 0.69 MG/DL — SIGNIFICANT CHANGE UP (ref 0.5–1.3)
CREAT SERPL-MCNC: 0.74 MG/DL — SIGNIFICANT CHANGE UP (ref 0.5–1.3)
CREAT SERPL-MCNC: 0.99 MG/DL — SIGNIFICANT CHANGE UP (ref 0.5–1.3)
DIFF PNL FLD: ABNORMAL
EGFR: 60 ML/MIN/1.73M2 — SIGNIFICANT CHANGE UP
EGFR: 85 ML/MIN/1.73M2 — SIGNIFICANT CHANGE UP
EGFR: 91 ML/MIN/1.73M2 — SIGNIFICANT CHANGE UP
EOSINOPHIL # BLD AUTO: 0 K/UL — SIGNIFICANT CHANGE UP (ref 0–0.5)
EOSINOPHIL NFR BLD AUTO: 0 % — SIGNIFICANT CHANGE UP (ref 0–6)
EPI CELLS # UR: SIGNIFICANT CHANGE UP
GLUCOSE BLDC GLUCOMTR-MCNC: 141 MG/DL — HIGH (ref 70–99)
GLUCOSE BLDC GLUCOMTR-MCNC: 154 MG/DL — HIGH (ref 70–99)
GLUCOSE BLDC GLUCOMTR-MCNC: 155 MG/DL — HIGH (ref 70–99)
GLUCOSE BLDC GLUCOMTR-MCNC: 231 MG/DL — HIGH (ref 70–99)
GLUCOSE BLDC GLUCOMTR-MCNC: 321 MG/DL — HIGH (ref 70–99)
GLUCOSE BLDC GLUCOMTR-MCNC: 416 MG/DL — HIGH (ref 70–99)
GLUCOSE BLDC GLUCOMTR-MCNC: 51 MG/DL — CRITICAL LOW (ref 70–99)
GLUCOSE BLDC GLUCOMTR-MCNC: 56 MG/DL — LOW (ref 70–99)
GLUCOSE SERPL-MCNC: 241 MG/DL — HIGH (ref 70–99)
GLUCOSE SERPL-MCNC: 503 MG/DL — CRITICAL HIGH (ref 70–99)
GLUCOSE SERPL-MCNC: 88 MG/DL — SIGNIFICANT CHANGE UP (ref 70–99)
GLUCOSE UR QL: 1000 MG/DL
HCO3 BLDA-SCNC: 20 MMOL/L — LOW (ref 21–28)
HCT VFR BLD CALC: 42.7 % — SIGNIFICANT CHANGE UP (ref 34.5–45)
HGB BLD-MCNC: 12.7 G/DL — SIGNIFICANT CHANGE UP (ref 11.5–15.5)
IMM GRANULOCYTES NFR BLD AUTO: 6.3 % — HIGH (ref 0–0.9)
KETONES UR-MCNC: NEGATIVE — SIGNIFICANT CHANGE UP
LACTATE SERPL-SCNC: 3.7 MMOL/L — HIGH (ref 0.7–2)
LACTATE SERPL-SCNC: 4.4 MMOL/L — CRITICAL HIGH (ref 0.7–2)
LACTATE SERPL-SCNC: 5.6 MMOL/L — CRITICAL HIGH (ref 0.7–2)
LEUKOCYTE ESTERASE UR-ACNC: NEGATIVE — SIGNIFICANT CHANGE UP
LYMPHOCYTES # BLD AUTO: 0.86 K/UL — LOW (ref 1–3.3)
LYMPHOCYTES # BLD AUTO: 7.4 % — LOW (ref 13–44)
MCHC RBC-ENTMCNC: 22.4 PG — LOW (ref 27–34)
MCHC RBC-ENTMCNC: 29.7 GM/DL — LOW (ref 32–36)
MCV RBC AUTO: 75.3 FL — LOW (ref 80–100)
MONOCYTES # BLD AUTO: 0.64 K/UL — SIGNIFICANT CHANGE UP (ref 0–0.9)
MONOCYTES NFR BLD AUTO: 5.5 % — SIGNIFICANT CHANGE UP (ref 2–14)
NEUTROPHILS # BLD AUTO: 9.24 K/UL — HIGH (ref 1.8–7.4)
NEUTROPHILS NFR BLD AUTO: 79.9 % — HIGH (ref 43–77)
NITRITE UR-MCNC: NEGATIVE — SIGNIFICANT CHANGE UP
PCO2 BLDA: 33 MMHG — SIGNIFICANT CHANGE UP (ref 32–45)
PH BLDA: 7.39 — SIGNIFICANT CHANGE UP (ref 7.35–7.45)
PH UR: 5 — SIGNIFICANT CHANGE UP (ref 5–8)
PLATELET # BLD AUTO: 152 K/UL — SIGNIFICANT CHANGE UP (ref 150–400)
PO2 BLDA: 115 MMHG — HIGH (ref 83–108)
POTASSIUM SERPL-MCNC: 4.5 MMOL/L — SIGNIFICANT CHANGE UP (ref 3.5–5.3)
POTASSIUM SERPL-MCNC: 5 MMOL/L — SIGNIFICANT CHANGE UP (ref 3.5–5.3)
POTASSIUM SERPL-MCNC: 5 MMOL/L — SIGNIFICANT CHANGE UP (ref 3.5–5.3)
POTASSIUM SERPL-SCNC: 4.5 MMOL/L — SIGNIFICANT CHANGE UP (ref 3.5–5.3)
POTASSIUM SERPL-SCNC: 5 MMOL/L — SIGNIFICANT CHANGE UP (ref 3.5–5.3)
POTASSIUM SERPL-SCNC: 5 MMOL/L — SIGNIFICANT CHANGE UP (ref 3.5–5.3)
PROT SERPL-MCNC: 6.7 GM/DL — SIGNIFICANT CHANGE UP (ref 6–8.3)
PROT UR-MCNC: 15
RBC # BLD: 5.67 M/UL — HIGH (ref 3.8–5.2)
RBC # FLD: 23.9 % — HIGH (ref 10.3–14.5)
RBC CASTS # UR COMP ASSIST: SIGNIFICANT CHANGE UP /HPF (ref 0–4)
SAO2 % BLDA: 99 % — HIGH (ref 94–98)
SODIUM SERPL-SCNC: 136 MMOL/L — SIGNIFICANT CHANGE UP (ref 135–145)
SODIUM SERPL-SCNC: 138 MMOL/L — SIGNIFICANT CHANGE UP (ref 135–145)
SODIUM SERPL-SCNC: 140 MMOL/L — SIGNIFICANT CHANGE UP (ref 135–145)
SP GR SPEC: 1.02 — SIGNIFICANT CHANGE UP (ref 1.01–1.02)
UROBILINOGEN FLD QL: NEGATIVE — SIGNIFICANT CHANGE UP
WBC # BLD: 11.57 K/UL — HIGH (ref 3.8–10.5)
WBC # FLD AUTO: 11.57 K/UL — HIGH (ref 3.8–10.5)
WBC UR QL: SIGNIFICANT CHANGE UP /HPF (ref 0–5)

## 2023-06-05 PROCEDURE — 97530 THERAPEUTIC ACTIVITIES: CPT | Mod: GP

## 2023-06-05 PROCEDURE — 83735 ASSAY OF MAGNESIUM: CPT

## 2023-06-05 PROCEDURE — 97162 PT EVAL MOD COMPLEX 30 MIN: CPT | Mod: GP

## 2023-06-05 PROCEDURE — 80202 ASSAY OF VANCOMYCIN: CPT

## 2023-06-05 PROCEDURE — 85027 COMPLETE CBC AUTOMATED: CPT

## 2023-06-05 PROCEDURE — 84145 PROCALCITONIN (PCT): CPT

## 2023-06-05 PROCEDURE — 87077 CULTURE AEROBIC IDENTIFY: CPT

## 2023-06-05 PROCEDURE — 83605 ASSAY OF LACTIC ACID: CPT

## 2023-06-05 PROCEDURE — 82803 BLOOD GASES ANY COMBINATION: CPT

## 2023-06-05 PROCEDURE — C1751: CPT

## 2023-06-05 PROCEDURE — 84484 ASSAY OF TROPONIN QUANT: CPT

## 2023-06-05 PROCEDURE — 87186 SC STD MICRODIL/AGAR DIL: CPT

## 2023-06-05 PROCEDURE — 97116 GAIT TRAINING THERAPY: CPT | Mod: GP

## 2023-06-05 PROCEDURE — 82962 GLUCOSE BLOOD TEST: CPT

## 2023-06-05 PROCEDURE — 94760 N-INVAS EAR/PLS OXIMETRY 1: CPT

## 2023-06-05 PROCEDURE — 36600 WITHDRAWAL OF ARTERIAL BLOOD: CPT

## 2023-06-05 PROCEDURE — 36415 COLL VENOUS BLD VENIPUNCTURE: CPT

## 2023-06-05 PROCEDURE — 84100 ASSAY OF PHOSPHORUS: CPT

## 2023-06-05 PROCEDURE — 99223 1ST HOSP IP/OBS HIGH 75: CPT

## 2023-06-05 PROCEDURE — 93005 ELECTROCARDIOGRAM TRACING: CPT

## 2023-06-05 PROCEDURE — 99222 1ST HOSP IP/OBS MODERATE 55: CPT

## 2023-06-05 PROCEDURE — 80048 BASIC METABOLIC PNL TOTAL CA: CPT

## 2023-06-05 PROCEDURE — 82009 KETONE BODYS QUAL: CPT

## 2023-06-05 PROCEDURE — 71045 X-RAY EXAM CHEST 1 VIEW: CPT

## 2023-06-05 PROCEDURE — 87070 CULTURE OTHR SPECIMN AEROBIC: CPT

## 2023-06-05 PROCEDURE — 94640 AIRWAY INHALATION TREATMENT: CPT

## 2023-06-05 RX ORDER — SODIUM CHLORIDE 9 MG/ML
1000 INJECTION, SOLUTION INTRAVENOUS
Refills: 0 | Status: DISCONTINUED | OUTPATIENT
Start: 2023-06-05 | End: 2023-06-06

## 2023-06-05 RX ORDER — UMECLIDINIUM 62.5 UG/1
1 AEROSOL, POWDER ORAL DAILY
Refills: 0 | Status: DISCONTINUED | OUTPATIENT
Start: 2023-06-05 | End: 2023-06-05

## 2023-06-05 RX ORDER — APIXABAN 2.5 MG/1
5 TABLET, FILM COATED ORAL EVERY 12 HOURS
Refills: 0 | Status: DISCONTINUED | OUTPATIENT
Start: 2023-06-05 | End: 2023-06-16

## 2023-06-05 RX ORDER — MONTELUKAST 4 MG/1
1 TABLET, CHEWABLE ORAL
Refills: 0 | DISCHARGE

## 2023-06-05 RX ORDER — ATORVASTATIN CALCIUM 80 MG/1
20 TABLET, FILM COATED ORAL AT BEDTIME
Refills: 0 | Status: DISCONTINUED | OUTPATIENT
Start: 2023-06-05 | End: 2023-06-16

## 2023-06-05 RX ORDER — SACCHAROMYCES BOULARDII 250 MG
1 POWDER IN PACKET (EA) ORAL
Qty: 0 | Refills: 0 | DISCHARGE

## 2023-06-05 RX ORDER — SODIUM CHLORIDE 9 MG/ML
1000 INJECTION, SOLUTION INTRAVENOUS
Refills: 0 | Status: DISCONTINUED | OUTPATIENT
Start: 2023-06-05 | End: 2023-06-16

## 2023-06-05 RX ORDER — GLUCAGON INJECTION, SOLUTION 0.5 MG/.1ML
1 INJECTION, SOLUTION SUBCUTANEOUS ONCE
Refills: 0 | Status: DISCONTINUED | OUTPATIENT
Start: 2023-06-05 | End: 2023-06-16

## 2023-06-05 RX ORDER — ACETYLCYSTEINE 200 MG/ML
4 VIAL (ML) MISCELLANEOUS THREE TIMES A DAY
Refills: 0 | Status: DISCONTINUED | OUTPATIENT
Start: 2023-06-05 | End: 2023-06-16

## 2023-06-05 RX ORDER — MEROPENEM 1 G/30ML
1000 INJECTION INTRAVENOUS EVERY 12 HOURS
Refills: 0 | Status: DISCONTINUED | OUTPATIENT
Start: 2023-06-05 | End: 2023-06-05

## 2023-06-05 RX ORDER — DEXTROSE 50 % IN WATER 50 %
25 SYRINGE (ML) INTRAVENOUS ONCE
Refills: 0 | Status: DISCONTINUED | OUTPATIENT
Start: 2023-06-05 | End: 2023-06-16

## 2023-06-05 RX ORDER — IPRATROPIUM/ALBUTEROL SULFATE 18-103MCG
3 AEROSOL WITH ADAPTER (GRAM) INHALATION EVERY 6 HOURS
Refills: 0 | Status: DISCONTINUED | OUTPATIENT
Start: 2023-06-05 | End: 2023-06-16

## 2023-06-05 RX ORDER — DEXTROSE 50 % IN WATER 50 %
12.5 SYRINGE (ML) INTRAVENOUS ONCE
Refills: 0 | Status: DISCONTINUED | OUTPATIENT
Start: 2023-06-05 | End: 2023-06-16

## 2023-06-05 RX ORDER — INSULIN LISPRO 100/ML
VIAL (ML) SUBCUTANEOUS
Refills: 0 | Status: DISCONTINUED | OUTPATIENT
Start: 2023-06-05 | End: 2023-06-16

## 2023-06-05 RX ORDER — ONDANSETRON 8 MG/1
4 TABLET, FILM COATED ORAL EVERY 6 HOURS
Refills: 0 | Status: DISCONTINUED | OUTPATIENT
Start: 2023-06-05 | End: 2023-06-06

## 2023-06-05 RX ORDER — MEROPENEM 1 G/30ML
1000 INJECTION INTRAVENOUS EVERY 8 HOURS
Refills: 0 | Status: DISCONTINUED | OUTPATIENT
Start: 2023-06-05 | End: 2023-06-05

## 2023-06-05 RX ORDER — DEXTROSE 50 % IN WATER 50 %
25 SYRINGE (ML) INTRAVENOUS
Refills: 0 | Status: DISCONTINUED | OUTPATIENT
Start: 2023-06-05 | End: 2023-06-16

## 2023-06-05 RX ORDER — PANTOPRAZOLE SODIUM 20 MG/1
40 TABLET, DELAYED RELEASE ORAL
Refills: 0 | Status: DISCONTINUED | OUTPATIENT
Start: 2023-06-05 | End: 2023-06-16

## 2023-06-05 RX ORDER — DIPHENHYDRAMINE HCL 50 MG
50 CAPSULE ORAL ONCE
Refills: 0 | Status: COMPLETED | OUTPATIENT
Start: 2023-06-05 | End: 2023-06-05

## 2023-06-05 RX ORDER — AZTREONAM 2 G
1000 VIAL (EA) INJECTION EVERY 8 HOURS
Refills: 0 | Status: DISCONTINUED | OUTPATIENT
Start: 2023-06-05 | End: 2023-06-09

## 2023-06-05 RX ORDER — ALBUTEROL 90 UG/1
2 AEROSOL, METERED ORAL EVERY 4 HOURS
Refills: 0 | Status: COMPLETED | OUTPATIENT
Start: 2023-06-05 | End: 2024-05-03

## 2023-06-05 RX ORDER — FLUTICASONE FUROATE AND VILANTEROL TRIFENATATE 100; 25 UG/1; UG/1
1 POWDER RESPIRATORY (INHALATION) DAILY
Refills: 0 | Status: DISCONTINUED | OUTPATIENT
Start: 2023-06-05 | End: 2023-06-05

## 2023-06-05 RX ORDER — ACETYLCYSTEINE 200 MG/ML
4 VIAL (ML) MISCELLANEOUS EVERY 6 HOURS
Refills: 0 | Status: DISCONTINUED | OUTPATIENT
Start: 2023-06-05 | End: 2023-06-05

## 2023-06-05 RX ORDER — BUDESONIDE, MICRONIZED 100 %
0.5 POWDER (GRAM) MISCELLANEOUS
Refills: 0 | Status: DISCONTINUED | OUTPATIENT
Start: 2023-06-05 | End: 2023-06-16

## 2023-06-05 RX ORDER — SODIUM CHLORIDE 9 MG/ML
1000 INJECTION, SOLUTION INTRAVENOUS
Refills: 0 | Status: DISCONTINUED | OUTPATIENT
Start: 2023-06-05 | End: 2023-06-05

## 2023-06-05 RX ORDER — ALBUTEROL 90 UG/1
2 AEROSOL, METERED ORAL EVERY 6 HOURS
Refills: 0 | Status: DISCONTINUED | OUTPATIENT
Start: 2023-06-05 | End: 2023-06-05

## 2023-06-05 RX ORDER — VANCOMYCIN HCL 1 G
1000 VIAL (EA) INTRAVENOUS EVERY 12 HOURS
Refills: 0 | Status: COMPLETED | OUTPATIENT
Start: 2023-06-05 | End: 2023-06-09

## 2023-06-05 RX ORDER — DEXTROSE 50 % IN WATER 50 %
50 SYRINGE (ML) INTRAVENOUS
Refills: 0 | Status: DISCONTINUED | OUTPATIENT
Start: 2023-06-05 | End: 2023-06-16

## 2023-06-05 RX ORDER — INSULIN GLARGINE 100 [IU]/ML
45 INJECTION, SOLUTION SUBCUTANEOUS AT BEDTIME
Refills: 0 | Status: DISCONTINUED | OUTPATIENT
Start: 2023-06-05 | End: 2023-06-05

## 2023-06-05 RX ORDER — DEXTROSE 50 % IN WATER 50 %
15 SYRINGE (ML) INTRAVENOUS ONCE
Refills: 0 | Status: DISCONTINUED | OUTPATIENT
Start: 2023-06-05 | End: 2023-06-16

## 2023-06-05 RX ORDER — MONTELUKAST 4 MG/1
10 TABLET, CHEWABLE ORAL AT BEDTIME
Refills: 0 | Status: DISCONTINUED | OUTPATIENT
Start: 2023-06-05 | End: 2023-06-16

## 2023-06-05 RX ORDER — MEROPENEM 1 G/30ML
1000 INJECTION INTRAVENOUS ONCE
Refills: 0 | Status: COMPLETED | OUTPATIENT
Start: 2023-06-05 | End: 2023-06-05

## 2023-06-05 RX ORDER — INSULIN LISPRO 100/ML
5 VIAL (ML) SUBCUTANEOUS
Refills: 0 | Status: DISCONTINUED | OUTPATIENT
Start: 2023-06-05 | End: 2023-06-16

## 2023-06-05 RX ORDER — INSULIN GLARGINE 100 [IU]/ML
45 INJECTION, SOLUTION SUBCUTANEOUS AT BEDTIME
Refills: 0 | Status: DISCONTINUED | OUTPATIENT
Start: 2023-06-05 | End: 2023-06-06

## 2023-06-05 RX ORDER — INSULIN LISPRO 100/ML
VIAL (ML) SUBCUTANEOUS
Refills: 0 | Status: DISCONTINUED | OUTPATIENT
Start: 2023-06-05 | End: 2023-06-05

## 2023-06-05 RX ORDER — DEXTROSE 50 % IN WATER 50 %
12.5 SYRINGE (ML) INTRAVENOUS ONCE
Refills: 0 | Status: COMPLETED | OUTPATIENT
Start: 2023-06-05 | End: 2023-06-05

## 2023-06-05 RX ORDER — INSULIN LISPRO 100/ML
VIAL (ML) SUBCUTANEOUS AT BEDTIME
Refills: 0 | Status: DISCONTINUED | OUTPATIENT
Start: 2023-06-05 | End: 2023-06-05

## 2023-06-05 RX ORDER — INSULIN LISPRO 100/ML
12 VIAL (ML) SUBCUTANEOUS ONCE
Refills: 0 | Status: COMPLETED | OUTPATIENT
Start: 2023-06-05 | End: 2023-06-05

## 2023-06-05 RX ORDER — FLUTICASONE FUROATE AND VILANTEROL TRIFENATATE 100; 25 UG/1; UG/1
1 POWDER RESPIRATORY (INHALATION)
Refills: 0 | DISCHARGE

## 2023-06-05 RX ORDER — INSULIN HUMAN 100 [IU]/ML
8 INJECTION, SOLUTION SUBCUTANEOUS
Qty: 100 | Refills: 0 | Status: DISCONTINUED | OUTPATIENT
Start: 2023-06-05 | End: 2023-06-05

## 2023-06-05 RX ORDER — INSULIN LISPRO 100/ML
5 VIAL (ML) SUBCUTANEOUS
Refills: 0 | Status: DISCONTINUED | OUTPATIENT
Start: 2023-06-05 | End: 2023-06-05

## 2023-06-05 RX ORDER — MEXILETINE HYDROCHLORIDE 150 MG/1
200 CAPSULE ORAL EVERY 8 HOURS
Refills: 0 | Status: DISCONTINUED | OUTPATIENT
Start: 2023-06-05 | End: 2023-06-16

## 2023-06-05 RX ADMIN — Medication 12.5 GRAM(S): at 17:23

## 2023-06-05 RX ADMIN — Medication 50 MILLIGRAM(S): at 14:41

## 2023-06-05 RX ADMIN — Medication 4 MILLILITER(S): at 20:59

## 2023-06-05 RX ADMIN — Medication 12 UNIT(S): at 11:46

## 2023-06-05 RX ADMIN — Medication 4: at 20:01

## 2023-06-05 RX ADMIN — SODIUM CHLORIDE 1000 MILLILITER(S): 9 INJECTION INTRAMUSCULAR; INTRAVENOUS; SUBCUTANEOUS at 00:30

## 2023-06-05 RX ADMIN — Medication 250 MILLIGRAM(S): at 14:47

## 2023-06-05 RX ADMIN — MEXILETINE HYDROCHLORIDE 200 MILLIGRAM(S): 150 CAPSULE ORAL at 17:24

## 2023-06-05 RX ADMIN — INSULIN GLARGINE 45 UNIT(S): 100 INJECTION, SOLUTION SUBCUTANEOUS at 21:47

## 2023-06-05 RX ADMIN — SODIUM CHLORIDE 1000 MILLILITER(S): 9 INJECTION, SOLUTION INTRAVENOUS at 11:55

## 2023-06-05 RX ADMIN — Medication 40 MILLIGRAM(S): at 21:47

## 2023-06-05 RX ADMIN — INSULIN GLARGINE 45 UNIT(S): 100 INJECTION, SOLUTION SUBCUTANEOUS at 04:24

## 2023-06-05 RX ADMIN — Medication 3 MILLILITER(S): at 20:59

## 2023-06-05 RX ADMIN — MEXILETINE HYDROCHLORIDE 200 MILLIGRAM(S): 150 CAPSULE ORAL at 21:46

## 2023-06-05 RX ADMIN — Medication 40 MILLIGRAM(S): at 14:41

## 2023-06-05 RX ADMIN — MONTELUKAST 10 MILLIGRAM(S): 4 TABLET, CHEWABLE ORAL at 21:47

## 2023-06-05 RX ADMIN — Medication 50 MILLIGRAM(S): at 21:48

## 2023-06-05 RX ADMIN — ATORVASTATIN CALCIUM 20 MILLIGRAM(S): 80 TABLET, FILM COATED ORAL at 21:47

## 2023-06-05 RX ADMIN — Medication 600 MILLIGRAM(S): at 21:46

## 2023-06-05 RX ADMIN — Medication 50 MILLIGRAM(S): at 12:17

## 2023-06-05 RX ADMIN — Medication 0.5 MILLIGRAM(S): at 21:16

## 2023-06-05 RX ADMIN — MEROPENEM 100 MILLIGRAM(S): 1 INJECTION INTRAVENOUS at 11:46

## 2023-06-05 RX ADMIN — APIXABAN 5 MILLIGRAM(S): 2.5 TABLET, FILM COATED ORAL at 21:47

## 2023-06-05 RX ADMIN — Medication 250 MILLIGRAM(S): at 21:48

## 2023-06-05 RX ADMIN — SODIUM CHLORIDE 100 MILLILITER(S): 9 INJECTION, SOLUTION INTRAVENOUS at 15:12

## 2023-06-05 NOTE — H&P ADULT - NSICDXPASTSURGICALHX_GEN_ALL_CORE_FT
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    H/O pelvic surgery 5 years ago - s/p fracture    H/O total knee replacement, bilateral 5 years ago    History of partial hysterectomy 30 years ago - fibroids    History of sinus surgery multiple sinus surgeries    History of tracheomalacia 2015 - attempted tracheal stenting ([facility])- course complicated by obstruction, respiratory failure, multiple CPR attempts -  stent discontinued; 10/20/2016 Tracheobronchoplasty (Prolene Mesh) performed at [facility] by Dr Zapien    Rectal bleeding exam under anesthesia ([facility]) 2/2018    S/P bronchoscopy 6/5/2018 - [place] (Dr Zapien) no evidence of tracheobronchomalacia in trachea or bronchial tubes

## 2023-06-05 NOTE — H&P ADULT - ASSESSMENT
A:    72F  HD # 1    Here for:    1. DKA  2. Lactic acidosis  3. Hyperglycemia  4. SOB  5. PNA  6. Asthma  7. Bronchiectasis    This patient requires critical care for support of one or more vital organ systems with a high probability of imminent or life threatening deterioration in his/her condition    P:    DKA, rising LA despite LA insulin and IVF  Steroids and albuterol (potent B2 agonist) contributing to lactic acidosis, suspecting bringing on DKA in this Pt with IDDM on high dose insulin therapy    Start insulin drip  IVF; add'l IL bolus now, then start LR  Trend LA, BMP at 1500 today  CT chest shows possible PNA; recent admit for ESBL PNA; not febrile, but leukocytosis, although that can also be 2/2 demargination from current steroids use; start merrem 1g IV BID, ID consult multiple abx allergies, ESBL only S to carbapenem and aminoglycosides)  Nebs, mucomyst, inhalers, inhaled corticosteroids, mucomyst  Continue steroids and taper  Statin  PPI  VTE ppx on DOAC already  Diet  OOB as able  BG < 180      Dispo: Accept to critical care.    TOTAL CRITICAL CARE TIME: 112 minutes  (EXCLUSIVE of any non bundled procedures)    Note: This time spent INCLUDES time spent directly as this patient's bedside with evaluation, review of chart including review of laboratory and imaging studies, interpretation of vital signs and cardiac output measurements, any necessary ventilator management, and time spent discussing plan of care with patient and family, including goals of care discussion.

## 2023-06-05 NOTE — CONSULT NOTE ADULT - SUBJECTIVE AND OBJECTIVE BOX
72y Female past medical history of asthma, diabetes, paroxysmal A-fib on Eliquis colorectal cancer many years ago status post colostomy is presenting with concern for shortness of breath.  States that she was recently discharged a few weeks ago from this hospital for parainfluenza.  Since being home she had done well and then 2 days ago she began to feel chest tightness with shortness of breath again.  States she has not had her Mucomyst which she feels may be contributing to her symptoms.  Denies any pleuritic chest pain.  Has been having productive cough similar to previous episodes.  Still taking steroids.      maintained pulse ox 97% on room air.  She was found to have a repeated lactate that was elevated.  This is likely in the setting of the albuterol use causing a lactic acidosis.  She otherwise does not have any evidence of acute infection at this time.  She is not tachycardic or febrile.  CT chest also shows improvement in findings from previous.  I discussed with patient at     /PMH TIA, DVT, Colorectal CA s/p tx, Tracheobronchomalacia s/p Tracheobronchoplasty 2016 (no further evidence), adrenal insufficiency on steroids, COPD/Asthma, Afib/AC, T2DM, #Sepsis as evidenced by hypotension, fever#Parainfluenza Bronchiolitis#Asthma exacerbationSputum Cx grew moderate  P. mirabilis and ESBL  IV meropenem , cont till 5/22;    72y Female past medical history  TIA, DVT, Colorectal CA s/p tx, Tracheobronchomalacia s/p Tracheobronchoplasty 2016 (no further evidence), adrenal insufficiency on steroids, COPD/Asthma,  T2DM, of , , paroxysmal A-fib on Eliquis colorectal cancer many years ago status post colostomy is presenting with concern for shortness of breath.    She was recently discharged May 2023 s/p hospitalisation for parainfluenza bronchiolitis , Proteus mirabilis  ESBL sputim positive  PNA completed meropenem until 23  In 2 to 3 days after discharge  she began to have cough again  and 2 days ago  she began to feel chest tightness with shortness of breath again.    Denies any pleuritic chest pain.  Has been having productive cough similar to previous episodes.   still on medrol dose taper from last admission    After neb in ED maintained pulse ox 97% on room air and SOb improved .  She was found to have a repeated lactate that was elevated.  This is likely in the setting of the albuterol use causing a lactic acidosis.  She otherwise does not have any evidence of acute infection at this time.  She is not tachycardic or febrile.  CT chest also shows improvement in findings from previous.      PAST MEDICAL HISTORY:  Adrenal insufficiency Medrol daily for over 50 years    Aortic insufficiency moderate AR on echo 5/3/2018    Asthma     Atrial fibrillation paroxysmal, on eliquis    Colorectal cancer 2018- last treatment , chemo and radiation    COPD (chronic obstructive pulmonary disease)     Diabetes Type 2    DVT (deep venous thrombosis) 15-20 years ago, took coumadin    Pelvic fracture     Rectal bleeding     Seizure x 1 18    TIA (transient ischemic attack) multiple, last 5 years ago - presents as right-sided weakness    Tracheobronchomalacia diagnosed , s/p bronchial thermoplasty  (Dr Zapien); recent bronchoscopy 2018 revealed no evidence of tracheobronchomalacia in trachea or bronchial tubes.     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    H/O pelvic surgery 5 years ago - s/p fracture    H/O total knee replacement, bilateral 5 years ago    History of partial hysterectomy 30 years ago - fibroids    History of sinus surgery multiple sinus surgeries    History of tracheomalacia  - attempted tracheal stenting ([facility])- course complicated by obstruction, respiratory failure, multiple CPR attempts -  stent discontinued; 10/20/2016 Tracheobronchoplasty (Prolene Mesh) performed at [facility] by Dr Zapien    Rectal bleeding exam under anesthesia ([facility]) 2018    S/P bronchoscopy 2018 - [place] (Dr Zapien) no evidence of tracheobronchomalacia in trachea or bronchial tubes.     Social History:   Currently in [facility]  needs assistance to ambulate  denies smoking/etoh    FAMILY HISTORY:  Family history of asthma  Family history of breast cancer (Sibling)  Family history of diabetes mellitus type II    Vital Signs Last 24 Hrs  T(C): 36.5 (2023 12:18), Max: 36.7 (2023 18:53)  T(F): 97.7 (2023 12:18), Max: 98 (2023 18:53)  HR: 83 (2023 14:00) (72 - 94)  BP: 126/76 (2023 14:00) (126/76 - 154/82)  BP(mean): 103 (2023 13:08) (102 - 106)  RR: 24 (2023 14:00) (17 - 25)  SpO2: 100% (2023 14:00) (96% - 100%)    Parameters below as of 2023 14:00  Patient On (Oxygen Delivery Method): room air      Daily Height in cm: 170.18 (2023 18:50)    Daily     PE:  Constitutional: frail looking  HEENT: NC/AT, EOMI, PERRLA, conjunctivae clear; ears and nose atraumatic; pharynx benign  Neck: supple; thyroid not palpable  Back: no tenderness  Respiratory: decreased breath sounds, no wheezing    Cardiovascular: S1S2 regular, no murmurs  Abdomen: soft, not tender, not distended, colostomy bag with fecal output  Genitourinary: no suprapubic tenderness  Lymphatic: no LN palpable  Musculoskeletal: no muscle tenderness, no joint swelling or tenderness  Extremities: no pedal edema  Neurological/ Psychiatric: moving all extremities  Skin: sacral decubitus stage 2 to3     Labs: all available labs reviewed                        12.7   11.57 )-----------( 152      ( 2023 10:26 )             42.7     06-05    140  |  112<H>  |  20  ----------------------------<  88  5.0   |  22  |  0.69    Ca    8.4<L>      2023 15:09    TPro  6.7  /  Alb  3.2<L>  /  TBili  0.3  /  DBili  x   /  AST  19  /  ALT  25  /  AlkPhos  114  06-05     LIVER FUNCTIONS - ( 2023 10:26 )  Alb: 3.2 g/dL / Pro: 6.7 gm/dL / ALK PHOS: 114 U/L / ALT: 25 U/L / AST: 19 U/L / GGT: x           Urinalysis Basic - ( 2023 03:40 )    Color: Yellow / Appearance: Clear / S.020 / pH: x  Gluc: x / Ketone: Negative  / Bili: Negative / Urobili: Negative   Blood: x / Protein: 15 / Nitrite: Negative   Leuk Esterase: Negative / RBC: 0-2 /HPF / WBC 0-2 /HPF   Sq Epi: x / Non Sq Epi: x / Bacteria: Negative          Radiology: all available radiological tests reviewed  < from: CT Chest No Cont (23 @ 22:01) >  ACC: [id number] EXAM:  CT CHEST   ORDERED BY: DELONTE NUGENT     PROCEDURE DATE:  2023          INTERPRETATION:  CLINICAL INFORMATION: Shortness of breath. History of   lung disease.    COMPARISON: 2023.    CONTRAST/COMPLICATIONS:  IV Contrast: NONE  Oral Contrast: NONE  Complications: None reported at time of study completion    PROCEDURE:  CT of the Chest was performed.  Sagittal and coronal reformats were performed.    FINDINGS:    LUNGS AND AIRWAYS: Patent central airways.  Overall improved appearance   of previously noted nodules. Small residual clusters of tree-in-bud type   nodules within the right lung base are appreciated within the right   middle and right lower lobes. There are no confluent airspace opacities.   Linear atelectasis or scarring is seen within the lingula.  PLEURA: No pleural effusion.  MEDIASTINUM AND MARANDA: No lymphadenopathy. A small hiatal hernia.  VESSELS: Ascending aortic prosthetic. Coronary and aortic calcifications   are appreciated. 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 This appearance is stable   compared to previous exam  HEART: Rate valve repair changes are appreciated. The heart size is   normal. Valvular calcifications are noted. No pericardial effusion.  CHEST WALL AND LOWER NECK: Within normal limits.  VISUALIZED UPPER ABDOMEN: Punctate calcified granuloma of the right   hepatic lobe. Calcification of the left kidney may be parenchymal in   nature. Alternatively, this maybe related to complex cyst. If clinically   sonography or contrast enhancement or MRI or CT can further delineation.  Cystic lesions of the pancreatic tail are again noted and are stable.  BONES: Poststernotomy changes. Mottled appearance of the osseous   structures, which may be related to osteopenia pattern, though may also   indicate underlying metabolic or hematologic condition. Correlate   clinically.    IMPRESSION:  Interval improvement of infectious/inflammatory bronchiolitis/bronchitis.   Mild residual clusters of tree in bud type nodularity are seen at the   right middle and right lower lobes inferiorly.    Additional findings as above.

## 2023-06-05 NOTE — CONSULT NOTE ADULT - ASSESSMENT
71 yo PMHx  asthma, diabetes, paroxysmal A-fib on Eliquis colorectal cancer many years ago status post colostomy presenting with concern for shortness of breath. States that she was recently discharged a few weeks ago from this hospital for parainfluenza. Since being home she had done well and then 2 days ago she began to feel chest tightness with shortness of breath again. States she has not had her Mucomyst which she feels may be contributing to her symptoms. Has been having productive cough similar to previous episodes. Still taking steroids In OBS O/N Increasing lactic acid, serum glucose and decreasing serum bicarbonate prompting ICU consult. Imaging shows interval improvement of infectious/inflammatory bronchiolitis/bronchitis. Mild residual clusters of tree in bud type nodularity are seen at the right middle and right lower lobes inferiorly. Was given merrem developed flushing on face/rash/itchiness, concern for allergy raised, abx discontinued.     1. Dyspnea. RLL resolving pneumonia. Bronchiolitis. PCN allergy.   - imaging reviewed  - dc merrem possible allergic rxn  - start vancomycin 0ksi06w check trough prior to 4th dose  - start aztreonam 1gmq8h  - continue with antibiotic coverage  - fu cultures  - monitor temps  - tolerating abx well so far; no side effects noted  - reason for abx use and side effects reviewed with patient  - supportive care  - fu cbc    2. other issues - care per medicine 
72y Female past medical history  TIA, DVT, Colorectal CA s/p tx, Tracheobronchomalacia s/p Tracheobronchoplasty 2016 (no further evidence), adrenal insufficiency on steroids, COPD/Asthma,  T2DM, of , , paroxysmal A-fib on Eliquis colorectal cancer many years ago status post colostomy is presenting with concern for shortness of breath.    A/P  DKA likely from steroid use  Lacticacidosis likely from albuterol and   Asthma exacerbation mild, hx bronchiectasis    Resolving Pneumonia- no new infiltrates     I dw with ICU to transfer to higher level of care for insulin drip    s/p 3 L IVF boluses , started IVF 250cc/hr  meropenem tolerated last admission- hence 1 dose meropenem given - but concern for allergy - so ID switch to azactam   blood cx   taper steroids   nebulizer   pt full code   pt being transferred to ICU  i dw with ICU PA       
Assessment/Plan:    - Insulin Drip  - Broad spectrum Abx-Vanco/Aztreonam- per ID  - IV solumedrol  - Aerosols with Duoneb/Budesonide  - Mucomyst  - Chest PT  - Patient to bring in Smart Vest for use BID  - Follow [facility] carefully  - Mucinex

## 2023-06-05 NOTE — CONSULT NOTE ADULT - SUBJECTIVE AND OBJECTIVE BOX
Patient is a 72y old  Female who presents with a chief complaint of Malaise, SOB (2023 12:08)      73 yo PMHx  asthma, diabetes, paroxysmal A-fib on Eliquis colorectal cancer many years ago status post colostomy presenting with concern for shortness of breath. States that she was recently discharged a few weeks ago from this hospital for parainfluenza. Since being home she had done well and then 2 days ago she began to feel chest tightness with shortness of breath again. States she has not had her Mucomyst which she feels may be contributing to her symptoms. Denies any pleuritic chest pain. Has been having productive cough similar to previous episodes. Still taking steroids In OBS O/N Increasing lactic acid, serum glucose and decreasing serum bicarbonate prompting ICU consult. Imaging shows interval improvement of infectious/inflammatory bronchiolitis/bronchitis. Mild residual clusters of tree in bud type nodularity are seen at the right middle and right lower lobes inferiorly. Was given merrem developed flushing on face/rash/itchiness, concern for allergy raised, abx discontinued.     PMH: as above  PSH: as above    Meds: per reconciliation sheet, noted below  MEDICATIONS  (STANDING):  acetylcysteine 10%  Inhalation 4 milliLiter(s) Inhalation three times a day  apixaban 5 milliGRAM(s) Oral every 12 hours  atorvastatin 20 milliGRAM(s) Oral at bedtime  aztreonam  IVPB 1000 milliGRAM(s) IV Intermittent every 8 hours  buDESOnide    Inhalation Suspension 0.5 milliGRAM(s) Inhalation two times a day  dextrose 5%. 1000 milliLiter(s) (50 mL/Hr) IV Continuous <Continuous>  dextrose 5%. 1000 milliLiter(s) (100 mL/Hr) IV Continuous <Continuous>  dextrose 50% Injectable 50 milliLiter(s) IV Push every 15 minutes  dextrose 50% Injectable 25 milliLiter(s) IV Push every 15 minutes  dextrose 50% Injectable 25 Gram(s) IV Push once  dextrose 50% Injectable 12.5 Gram(s) IV Push once  dextrose 50% Injectable 25 Gram(s) IV Push once  fluticasone furoate/vilanterol 200-25 MICROgram(s) Inhaler 1 Puff(s) Inhalation daily  glucagon  Injectable 1 milliGRAM(s) IntraMuscular once  insulin regular Infusion 8 Unit(s)/Hr (8 mL/Hr) IV Continuous <Continuous>  lactated ringers. 1000 milliLiter(s) (100 mL/Hr) IV Continuous <Continuous>  methylPREDNISolone sodium succinate Injectable 40 milliGRAM(s) IV Push three times a day  mexiletine 200 milliGRAM(s) Oral every 8 hours  montelukast 10 milliGRAM(s) Oral at bedtime  pantoprazole    Tablet 40 milliGRAM(s) Oral before breakfast  sodium chloride 0.45%. 1000 milliLiter(s) (1000 mL/Hr) IV Continuous <Continuous>  umeclidinium 62.5 MICROgram(s) Inhaler 1 Puff(s) Inhalation daily  vancomycin  IVPB 1000 milliGRAM(s) IV Intermittent every 12 hours      Allergies    Dilaudid (Short breath)  tetanus toxoid (Short breath)  cefepime (Anaphylaxis)  Valium (Short breath)  Avelox (Short breath; Pruritus)  iodine (Short breath; Swelling)  penicillin (Anaphylaxis)  codeine (Short breath)  shellfish (Anaphylaxis)  aspirin (Short breath)    Intolerances      Social: no smoking, no alcohol, no illegal drugs; no recent travel, no exposure to TB  FAMILY HISTORY:  Family history of asthma    Family history of breast cancer (Sibling)    Family history of diabetes mellitus type II       no history of premature cardiovascular disease in first degree relatives    ROS: the patient denies fever, no chills, no HA, no dizziness, no sore throat, no blurry vision, no CP, no palpitations, + SOB, + cough, no abdominal pain, no diarrhea, no N/V, no dysuria, no leg pain, no claudication, no rash, no joint aches, no rectal pain or bleeding, no night sweats    All other systems reviewed and are negative    Vital Signs Last 24 Hrs  T(C): 36.5 (2023 12:18), Max: 36.7 (2023 18:53)  T(F): 97.7 (2023 12:18), Max: 98 (2023 18:53)  HR: 83 (2023 14:00) (72 - 94)  BP: 126/76 (2023 14:00) (126/76 - 154/82)  BP(mean): 103 (2023 13:08) (102 - 106)  RR: 24 (2023 14:00) (17 - 25)  SpO2: 100% (2023 14:00) (96% - 100%)    Parameters below as of 2023 14:00  Patient On (Oxygen Delivery Method): room air      Daily Height in cm: 170.18 (2023 18:50)    Daily     PE:  Constitutional: frail looking  HEENT: NC/AT, EOMI, PERRLA, conjunctivae clear; ears and nose atraumatic; pharynx benign  Neck: supple; thyroid not palpable  Back: no tenderness  Respiratory: decreased breath sounds, rhonchi   Cardiovascular: S1S2 regular, no murmurs  Abdomen: soft, not tender, not distended, positive BS; liver and spleen WNL  Genitourinary: no suprapubic tenderness  Lymphatic: no LN palpable  Musculoskeletal: no muscle tenderness, no joint swelling or tenderness  Extremities: no pedal edema  Neurological/ Psychiatric: moving all extremities  Skin: no rashes; no palpable lesions    Labs: all available labs reviewed                        12.7   11.57 )-----------( 152      ( 2023 10:26 )             42.7     06-05    140  |  112<H>  |  20  ----------------------------<  88  5.0   |  22  |  0.69    Ca    8.4<L>      2023 15:09    TPro  6.7  /  Alb  3.2<L>  /  TBili  0.3  /  DBili  x   /  AST  19  /  ALT  25  /  AlkPhos  114  06-05     LIVER FUNCTIONS - ( 2023 10:26 )  Alb: 3.2 g/dL / Pro: 6.7 gm/dL / ALK PHOS: 114 U/L / ALT: 25 U/L / AST: 19 U/L / GGT: x           Urinalysis Basic - ( 2023 03:40 )    Color: Yellow / Appearance: Clear / S.020 / pH: x  Gluc: x / Ketone: Negative  / Bili: Negative / Urobili: Negative   Blood: x / Protein: 15 / Nitrite: Negative   Leuk Esterase: Negative / RBC: 0-2 /HPF / WBC 0-2 /HPF   Sq Epi: x / Non Sq Epi: x / Bacteria: Negative          Radiology: all available radiological tests reviewed  < from: CT Chest No Cont (23 @ 22:01) >  ACC: [id number] EXAM:  CT CHEST   ORDERED BY: DELONTE NUGENT     PROCEDURE DATE:  2023          INTERPRETATION:  CLINICAL INFORMATION: Shortness of breath. History of   lung disease.    COMPARISON: 2023.    CONTRAST/COMPLICATIONS:  IV Contrast: NONE  Oral Contrast: NONE  Complications: None reported at time of study completion    PROCEDURE:  CT of the Chest was performed.  Sagittal and coronal reformats were performed.    FINDINGS:    LUNGS AND AIRWAYS: Patent central airways.  Overall improved appearance   of previously noted nodules. Small residual clusters of tree-in-bud type   nodules within the right lung base are appreciated within the right   middle and right lower lobes. There are no confluent airspace opacities.   Linear atelectasis or scarring is seen within the lingula.  PLEURA: No pleural effusion.  MEDIASTINUM AND MARANDA: No lymphadenopathy. A small hiatal hernia.  VESSELS: Ascending aortic prosthetic. Coronary and aortic calcifications   are appreciated. 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 This appearance is stable   compared to previous exam  HEART: Rate valve repair changes are appreciated. The heart size is   normal. Valvular calcifications are noted. No pericardial effusion.  CHEST WALL AND LOWER NECK: Within normal limits.  VISUALIZED UPPER ABDOMEN: Punctate calcified granuloma of the right   hepatic lobe. Calcification of the left kidney may be parenchymal in   nature. Alternatively, this maybe related to complex cyst. If clinically   sonography or contrast enhancement or MRI or CT can further delineation.  Cystic lesions of the pancreatic tail are again noted and are stable.  BONES: Poststernotomy changes. Mottled appearance of the osseous   structures, which may be related to osteopenia pattern, though may also   indicate underlying metabolic or hematologic condition. Correlate   clinically.    IMPRESSION:  Interval improvement of infectious/inflammatory bronchiolitis/bronchitis.   Mild residual clusters of tree in bud type nodularity are seen at the   right middle and right lower lobes inferiorly.    Additional findings as above.        Advanced directives addressed: full resuscitation

## 2023-06-05 NOTE — H&P ADULT - NSHPPHYSICALEXAM_GEN_ALL_CORE
O:    Elderly F sitting in chair  No JVD trachea midline  + some scattered expiratory wheezing noted  S1S2+  Abd soft NTND  Pedal edema in feet  Awake and alert  Skin otherwise well perfused and warm

## 2023-06-05 NOTE — PATIENT PROFILE ADULT - FALL HARM RISK - HARM RISK INTERVENTIONS

## 2023-06-05 NOTE — H&P ADULT - HISTORY OF PRESENT ILLNESS
72y Female past medical history of asthma, diabetes, paroxysmal A-fib on Eliquis colorectal cancer many years ago status post colostomy is presenting with concern for shortness of breath.  States that she was recently discharged a few weeks ago from this hospital for parainfluenza.  Since being home she had done well and then 2 days ago she began to feel chest tightness with shortness of breath again.  States she has not had her Mucomyst which she feels may be contributing to her symptoms.  Denies any pleuritic chest pain.  Has been having productive cough similar to previous episodes.  Still taking steroids.      maintained pulse ox 97% on room air.  She was found to have a repeated lactate that was elevated.  This is likely in the setting of the albuterol use causing a lactic acidosis.  She otherwise does not have any evidence of acute infection at this time.  She is not tachycardic or febrile.  CT chest also shows improvement in findings from previous.  I discussed with patient at     /PMH TIA, DVT, Colorectal CA s/p tx, Tracheobronchomalacia s/p Tracheobronchoplasty 2016 (no further evidence), adrenal insufficiency on steroids, COPD/Asthma, Afib/AC, T2DM, #Sepsis as evidenced by hypotension, fever#Parainfluenza Bronchiolitis#Asthma exacerbationSputum Cx grew moderate  P. mirabilis and ESBL  IV meropenem , cont till 5/22; 
ICU Admit Note    S:    Pt seen and examined  HD # 1  FULL CODE  PMHx  asthma, diabetes, paroxysmal A-fib on Eliquis colorectal cancer many years ago status post colostomy  presenting with concern for shortness of breath.  States that she was recently discharged a few weeks ago from this hospital for parainfluenza.  Since being home she had done well and then 2 days ago she began to feel chest tightness with shortness of breath again.  States she has not had her Mucomyst which she feels may be contributing to her symptoms.  Denies any pleuritic chest pain.  Has been having productive cough similar to previous episodes.  Still taking steroids    In OBS O/N  Increasing lactic acid, serum glucose and decreasing serum bicarbonate prompting ICU consult    6/5: Pt sitting in chair, c/o of malaise, SOB. Meds ordered.

## 2023-06-05 NOTE — H&P ADULT - NSHPLABSRESULTS_GEN_ALL_CORE
LABS:    CBC Full  -  ( 2023 10:26 )  WBC Count : 11.57 K/uL  RBC Count : 5.67 M/uL  Hemoglobin : 12.7 g/dL  Hematocrit : 42.7 %  Platelet Count - Automated : 152 K/uL  Mean Cell Volume : 75.3 fl  Mean Cell Hemoglobin : 22.4 pg  Mean Cell Hemoglobin Concentration : 29.7 gm/dL  Auto Neutrophil # : 9.24 K/uL  Auto Lymphocyte # : 0.86 K/uL  Auto Monocyte # : 0.64 K/uL  Auto Eosinophil # : 0.00 K/uL  Auto Basophil # : 0.10 K/uL  Auto Neutrophil % : 79.9 %  Auto Lymphocyte % : 7.4 %  Auto Monocyte % : 5.5 %  Auto Eosinophil % : 0.0 %  Auto Basophil % : 0.9 %        136  |  108  |  24<H>  ----------------------------<  503<HH>  5.0   |  19<L>  |  0.99    Ca    8.8      2023 10:26    TPro  6.7  /  Alb  3.2<L>  /  TBili  0.3  /  DBili  x   /  AST  19  /  ALT  25  /  AlkPhos  114  06-05      Urinalysis Basic - ( 2023 03:40 )    Color: Yellow / Appearance: Clear / S.020 / pH: x  Gluc: x / Ketone: Negative  / Bili: Negative / Urobili: Negative   Blood: x / Protein: 15 / Nitrite: Negative   Leuk Esterase: Negative / RBC: 0-2 /HPF / WBC 0-2 /HPF   Sq Epi: x / Non Sq Epi: x / Bacteria: Negative      CAPILLARY BLOOD GLUCOSE      POCT Blood Glucose.: 416 mg/dL (2023 11:38)  POCT Blood Glucose.: 380 mg/dL (2023 03:42)        LIVER FUNCTIONS - ( 2023 10:26 )  Alb: 3.2 g/dL / Pro: 6.7 gm/dL / ALK PHOS: 114 U/L / ALT: 25 U/L / AST: 19 U/L / GGT: x

## 2023-06-05 NOTE — CONSULT NOTE ADULT - SUBJECTIVE AND OBJECTIVE BOX
HPI:  ICU Admit Note    S:    Pt seen and examined  HD # 1  FULL CODE  PMHx  asthma, diabetes, paroxysmal A-fib on Eliquis colorectal cancer many years ago status post colostomy  presenting with concern for shortness of breath.  States that she was recently discharged a few weeks ago from this hospital for parainfluenza.  Since being home she had done well and then 2 days ago she began to feel chest tightness with shortness of breath again.  States she has not had her Mucomyst which she feels may be contributing to her symptoms.  Denies any pleuritic chest pain.  Has been having productive cough similar to previous episodes.  Still taking steroids    Increasing lactic acid, serum glucose and decreasing serum bicarbonate prompting ICU consult    Patient admitted to ICU for insulin drip. ABG's have improved.          PAST MEDICAL & SURGICAL HISTORY:  Atrial fibrillation  paroxysmal, on eliquis      Diabetes  Type 2      COPD (chronic obstructive pulmonary disease)      Adrenal insufficiency  Medrol daily for over 50 years      Aortic insufficiency  moderate AR on echo 5/3/2018      Pelvic fracture      Asthma      Tracheobronchomalacia  diagnosed , s/p bronchial thermoplasty  (Dr Zapien); recent bronchoscopy 2018 revealed no evidence of tracheobronchomalacia in trachea or bronchial tubes      Colorectal cancer  2018- last treatment , chemo and radiation      Rectal bleeding      Seizure  x 1 18      DVT (deep venous thrombosis)  15-20 years ago, took coumadin      TIA (transient ischemic attack)  multiple, last 5 years ago - presents as right-sided weakness      History of partial hysterectomy  30 years ago - fibroids      H/O total knee replacement, bilateral  5 years ago      History of sinus surgery  multiple sinus surgeries      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 by Dr Zapien      S/P bronchoscopy  2018 - [place] (Dr Zapien) no evidence of tracheobronchomalacia in trachea or bronchial tubes      MEDICATIONS  (STANDING):  acetylcysteine 10%  Inhalation 4 milliLiter(s) Inhalation three times a day  albuterol/ipratropium for Nebulization 3 milliLiter(s) Nebulizer every 6 hours  apixaban 5 milliGRAM(s) Oral every 12 hours  atorvastatin 20 milliGRAM(s) Oral at bedtime  aztreonam  IVPB 1000 milliGRAM(s) IV Intermittent every 8 hours  buDESOnide    Inhalation Suspension 0.5 milliGRAM(s) Inhalation two times a day  dextrose 5%. 1000 milliLiter(s) (50 mL/Hr) IV Continuous <Continuous>  dextrose 5%. 1000 milliLiter(s) (100 mL/Hr) IV Continuous <Continuous>  dextrose 50% Injectable 50 milliLiter(s) IV Push every 15 minutes  dextrose 50% Injectable 25 milliLiter(s) IV Push every 15 minutes  dextrose 50% Injectable 25 Gram(s) IV Push once  dextrose 50% Injectable 12.5 Gram(s) IV Push once  dextrose 50% Injectable 25 Gram(s) IV Push once  glucagon  Injectable 1 milliGRAM(s) IntraMuscular once  insulin glargine Injectable (LANTUS) 45 Unit(s) SubCutaneous at bedtime  insulin lispro (ADMELOG) corrective regimen sliding scale   SubCutaneous Before meals and at bedtime  insulin lispro Injectable (ADMELOG) 5 Unit(s) SubCutaneous three times a day before meals  lactated ringers. 1000 milliLiter(s) (100 mL/Hr) IV Continuous <Continuous>  methylPREDNISolone sodium succinate Injectable 40 milliGRAM(s) IV Push three times a day  mexiletine 200 milliGRAM(s) Oral every 8 hours  montelukast 10 milliGRAM(s) Oral at bedtime  pantoprazole    Tablet 40 milliGRAM(s) Oral before breakfast  sodium chloride 0.45%. 1000 milliLiter(s) (1000 mL/Hr) IV Continuous <Continuous>  umeclidinium 62.5 MICROgram(s) Inhaler 1 Puff(s) Inhalation daily  vancomycin  IVPB 1000 milliGRAM(s) IV Intermittent every 12 hours    MEDICATIONS  (PRN):  albuterol    90 MICROgram(s) HFA Inhaler 2 Puff(s) Inhalation every 4 hours PRN Shortness of Breath and/or Wheezing  albuterol    90 MICROgram(s) HFA Inhaler 2 Puff(s) Inhalation every 6 hours PRN for shortness of breath and/or wheezing after neb  dextrose Oral Gel 15 Gram(s) Oral once PRN Blood Glucose LESS THAN 70 milliGRAM(s)/deciliter  ondansetron    Tablet 4 milliGRAM(s) Oral every 6 hours PRN for nausea      Allergies    Dilaudid (Short breath)  tetanus toxoid (Short breath)  cefepime (Anaphylaxis)  Valium (Short breath)  Avelox (Short breath; Pruritus)  iodine (Short breath; Swelling)  penicillin (Anaphylaxis)  codeine (Short breath)  shellfish (Anaphylaxis)  aspirin (Short breath)    Intolerances        SOCIAL HISTORY: Denies tobacco, etoh abuse or illicit drug use    FAMILY HISTORY:  Family history of asthma    Family history of breast cancer (Sibling)    Family history of diabetes mellitus type II        Vital Signs Last 24 Hrs  T(C): 36.3 (2023 18:30), Max: 36.7 (2023 07:13)  T(F): 97.4 (2023 18:30), Max: 98 (2023 07:13)  HR: 83 (2023 18:30) (72 - 94)  BP: 148/54 (2023 18:30) (119/82 - 154/82)  BP(mean): 88 (2023 16:30) (88 - 103)  RR: 18 (2023 18:30) (17 - 25)  SpO2: 100% (2023 18:30) (96% - 100%)    Parameters below as of 2023 18:30  Patient On (Oxygen Delivery Method): room air        REVIEW OF SYSTEMS:    CONSTITUTIONAL:  As per HPI.  HEENT:  Eyes:  No diplopia or blurred vision. ENT:  No earache, sore throat or runny nose.  CARDIOVASCULAR:  No pressure, squeezing, tightness, heaviness or aching about the chest, neck, axilla or epigastrium.  RESPIRATORY:  See HPI  GASTROINTESTINAL:  No nausea, vomiting or diarrhea.  GENITOURINARY:  No dysuria, frequency or urgency.  MUSCULOSKELETAL:  As per HPI.  SKIN:  No change in skin, hair or nails.  NEUROLOGIC:  No paresthesias, fasciculations, seizures or weakness.  PSYCHIATRIC:  No disorder of thought or mood.  ENDOCRINE:  No heat or cold intolerance, polyuria or polydipsia.  HEMATOLOGICAL:  No easy bruising or bleedings:  .     PHYSICAL EXAMINATION:    GENERAL APPEARANCE:  Pt. is not currently dyspneic, in no distress. Pt. is alert, oriented, and pleasant.  HEENT:  Pupils are normal and react normally. No icterus. Mucous membranes well colored.  NECK:  Supple. No lymphadenopathy. Jugular venous pressure not elevated.  HEART:   The cardiac impulse has a normal quality. Regular. Normal S1 and S2.   CHEST:  Chest with scattered wheezes, more pronounced with forced maneuvers. Increased respiratory effort.  ABDOMEN:  Soft and nontender.   EXTREMITIES:  There is no cyanosis, clubbing or edema.   SKIN:  No rash or significant lesions are noted.    LABS:                        12.7   11.57 )-----------( 152      ( 2023 10:26 )             42.7     06-    140  |  112<H>  |  20  ----------------------------<  88  5.0   |  22  |  0.69    Ca    8.4<L>      2023 15:09    TPro  6.7  /  Alb  3.2<L>  /  TBili  0.3  /  DBili  x   /  AST  19  /  ALT  25  /  AlkPhos  114  -05    LIVER FUNCTIONS - ( 2023 10:26 )  Alb: 3.2 g/dL / Pro: 6.7 gm/dL / ALK PHOS: 114 U/L / ALT: 25 U/L / AST: 19 U/L / GGT: x                 Urinalysis Basic - ( 2023 03:40 )    Color: Yellow / Appearance: Clear / S.020 / pH: x  Gluc: x / Ketone: Negative  / Bili: Negative / Urobili: Negative   Blood: x / Protein: 15 / Nitrite: Negative   Leuk Esterase: Negative / RBC: 0-2 /HPF / WBC 0-2 /HPF   Sq Epi: x / Non Sq Epi: x / Bacteria: Negative      ABG - ( 2023 18:00 )  pH, Arterial: 7.39  pH, Blood: x     /  pCO2: 33    /  pO2: 115   / HCO3: 20    / Base Excess: -4.1  /  SaO2: 99          RADIOLOGY & ADDITIONAL STUDIES: < from: CT Chest No Cont (23 @ 22:01) >  ACC: [id number] EXAM:  CT CHEST   ORDERED BY: DELONTE NUGENT     PROCEDURE DATE:  2023          INTERPRETATION:  CLINICAL INFORMATION: Shortness of breath. History of   lung disease.    COMPARISON: 2023.    CONTRAST/COMPLICATIONS:  IV Contrast: NONE  Oral Contrast: NONE  Complications: None reported at time of study completion    PROCEDURE:  CT of the Chest was performed.  Sagittal and coronal reformats were performed.    FINDINGS:    LUNGS AND AIRWAYS: Patent central airways.  Overall improved appearance   of previously noted nodules. Small residual clusters of tree-in-bud type   nodules within the right lung base are appreciated within the right   middle and right lower lobes. There are no confluent airspace opacities.   Linear atelectasis or scarring is seen within the lingula.  PLEURA: No pleural effusion.  MEDIASTINUM AND MARANDA: No lymphadenopathy. A small hiatal hernia.  VESSELS: Ascending aortic prosthetic. Coronary and aortic calcifications   are appreciated. 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 This appearance is stable   compared to previous exam  HEART: Rate valve repair changes are appreciated. The heart size is   normal. Valvular calcifications are noted. No pericardial effusion.  CHEST WALL AND LOWER NECK: Within normal limits.  VISUALIZED UPPER ABDOMEN: Punctate calcified granuloma of the right   hepatic lobe. Calcification of the left kidney may be parenchymal in   nature. Alternatively, this maybe related to complex cyst. If clinically   sonography or contrast enhancement or MRI or CT can further delineation.  Cystic lesions of the pancreatic tail are again noted and are stable.  BONES: Poststernotomy changes. Mottled appearance of the osseous   structures, which may be related to osteopenia pattern, though may also   indicate underlying metabolic or hematologic condition. Correlate   clinically.    IMPRESSION:  Interval improvement of infectious/inflammatory bronchiolitis/bronchitis.   Mild residual clusters of tree in bud type nodularity are seen at the   right middle and right lower lobes inferiorly.    Additional findings as above.        SABRINA MESSINA MD

## 2023-06-06 LAB
ANION GAP SERPL CALC-SCNC: 4 MMOL/L — LOW (ref 5–17)
ANION GAP SERPL CALC-SCNC: 5 MMOL/L — SIGNIFICANT CHANGE UP (ref 5–17)
BUN SERPL-MCNC: 21 MG/DL — SIGNIFICANT CHANGE UP (ref 7–23)
BUN SERPL-MCNC: 23 MG/DL — SIGNIFICANT CHANGE UP (ref 7–23)
CALCIUM SERPL-MCNC: 8.3 MG/DL — LOW (ref 8.5–10.1)
CALCIUM SERPL-MCNC: 8.4 MG/DL — LOW (ref 8.5–10.1)
CHLORIDE SERPL-SCNC: 105 MMOL/L — SIGNIFICANT CHANGE UP (ref 96–108)
CHLORIDE SERPL-SCNC: 110 MMOL/L — HIGH (ref 96–108)
CO2 SERPL-SCNC: 27 MMOL/L — SIGNIFICANT CHANGE UP (ref 22–31)
CO2 SERPL-SCNC: 28 MMOL/L — SIGNIFICANT CHANGE UP (ref 22–31)
CREAT SERPL-MCNC: 0.59 MG/DL — SIGNIFICANT CHANGE UP (ref 0.5–1.3)
CREAT SERPL-MCNC: 0.88 MG/DL — SIGNIFICANT CHANGE UP (ref 0.5–1.3)
EGFR: 69 ML/MIN/1.73M2 — SIGNIFICANT CHANGE UP
EGFR: 95 ML/MIN/1.73M2 — SIGNIFICANT CHANGE UP
GLUCOSE BLDC GLUCOMTR-MCNC: 198 MG/DL — HIGH (ref 70–99)
GLUCOSE BLDC GLUCOMTR-MCNC: 213 MG/DL — HIGH (ref 70–99)
GLUCOSE BLDC GLUCOMTR-MCNC: 308 MG/DL — HIGH (ref 70–99)
GLUCOSE BLDC GLUCOMTR-MCNC: 361 MG/DL — HIGH (ref 70–99)
GLUCOSE SERPL-MCNC: 243 MG/DL — HIGH (ref 70–99)
GLUCOSE SERPL-MCNC: 393 MG/DL — HIGH (ref 70–99)
HCT VFR BLD CALC: 36.1 % — SIGNIFICANT CHANGE UP (ref 34.5–45)
HGB BLD-MCNC: 11 G/DL — LOW (ref 11.5–15.5)
MAGNESIUM SERPL-MCNC: 2.1 MG/DL — SIGNIFICANT CHANGE UP (ref 1.6–2.6)
MCHC RBC-ENTMCNC: 22.4 PG — LOW (ref 27–34)
MCHC RBC-ENTMCNC: 30.5 GM/DL — LOW (ref 32–36)
MCV RBC AUTO: 73.5 FL — LOW (ref 80–100)
PHOSPHATE SERPL-MCNC: 2.6 MG/DL — SIGNIFICANT CHANGE UP (ref 2.5–4.5)
PLATELET # BLD AUTO: 142 K/UL — LOW (ref 150–400)
POTASSIUM SERPL-MCNC: 4.4 MMOL/L — SIGNIFICANT CHANGE UP (ref 3.5–5.3)
POTASSIUM SERPL-MCNC: 4.6 MMOL/L — SIGNIFICANT CHANGE UP (ref 3.5–5.3)
POTASSIUM SERPL-SCNC: 4.4 MMOL/L — SIGNIFICANT CHANGE UP (ref 3.5–5.3)
POTASSIUM SERPL-SCNC: 4.6 MMOL/L — SIGNIFICANT CHANGE UP (ref 3.5–5.3)
PROCALCITONIN SERPL-MCNC: 0.08 NG/ML — SIGNIFICANT CHANGE UP (ref 0.02–0.1)
RBC # BLD: 4.91 M/UL — SIGNIFICANT CHANGE UP (ref 3.8–5.2)
RBC # FLD: 23.8 % — HIGH (ref 10.3–14.5)
SODIUM SERPL-SCNC: 137 MMOL/L — SIGNIFICANT CHANGE UP (ref 135–145)
SODIUM SERPL-SCNC: 142 MMOL/L — SIGNIFICANT CHANGE UP (ref 135–145)
VANCOMYCIN TROUGH SERPL-MCNC: 14.7 UG/ML — SIGNIFICANT CHANGE UP (ref 10–20)
WBC # BLD: 11.23 K/UL — HIGH (ref 3.8–10.5)
WBC # FLD AUTO: 11.23 K/UL — HIGH (ref 3.8–10.5)

## 2023-06-06 PROCEDURE — 99233 SBSQ HOSP IP/OBS HIGH 50: CPT

## 2023-06-06 RX ORDER — ALBUTEROL 90 UG/1
2 AEROSOL, METERED ORAL EVERY 4 HOURS
Refills: 0 | Status: DISCONTINUED | OUTPATIENT
Start: 2023-06-06 | End: 2023-06-16

## 2023-06-06 RX ORDER — INSULIN GLARGINE 100 [IU]/ML
55 INJECTION, SOLUTION SUBCUTANEOUS AT BEDTIME
Refills: 0 | Status: DISCONTINUED | OUTPATIENT
Start: 2023-06-06 | End: 2023-06-09

## 2023-06-06 RX ORDER — ONDANSETRON 8 MG/1
4 TABLET, FILM COATED ORAL EVERY 6 HOURS
Refills: 0 | Status: DISCONTINUED | OUTPATIENT
Start: 2023-06-06 | End: 2023-06-16

## 2023-06-06 RX ADMIN — Medication 4 MILLILITER(S): at 20:39

## 2023-06-06 RX ADMIN — Medication 50 MILLIGRAM(S): at 21:20

## 2023-06-06 RX ADMIN — Medication 250 MILLIGRAM(S): at 22:36

## 2023-06-06 RX ADMIN — Medication 10: at 13:47

## 2023-06-06 RX ADMIN — Medication 0.5 MILLIGRAM(S): at 20:39

## 2023-06-06 RX ADMIN — Medication 3 MILLILITER(S): at 20:39

## 2023-06-06 RX ADMIN — MEXILETINE HYDROCHLORIDE 200 MILLIGRAM(S): 150 CAPSULE ORAL at 05:54

## 2023-06-06 RX ADMIN — Medication 3 MILLILITER(S): at 15:25

## 2023-06-06 RX ADMIN — Medication 3 MILLILITER(S): at 09:43

## 2023-06-06 RX ADMIN — MEXILETINE HYDROCHLORIDE 200 MILLIGRAM(S): 150 CAPSULE ORAL at 21:20

## 2023-06-06 RX ADMIN — Medication 50 MILLIGRAM(S): at 05:54

## 2023-06-06 RX ADMIN — ATORVASTATIN CALCIUM 20 MILLIGRAM(S): 80 TABLET, FILM COATED ORAL at 21:21

## 2023-06-06 RX ADMIN — Medication 2: at 09:13

## 2023-06-06 RX ADMIN — Medication 8: at 17:56

## 2023-06-06 RX ADMIN — Medication 1 TABLET(S): at 13:43

## 2023-06-06 RX ADMIN — Medication 5 UNIT(S): at 09:13

## 2023-06-06 RX ADMIN — Medication 0.5 MILLIGRAM(S): at 09:16

## 2023-06-06 RX ADMIN — Medication 600 MILLIGRAM(S): at 21:21

## 2023-06-06 RX ADMIN — MONTELUKAST 10 MILLIGRAM(S): 4 TABLET, CHEWABLE ORAL at 21:21

## 2023-06-06 RX ADMIN — Medication 50 MILLIGRAM(S): at 13:45

## 2023-06-06 RX ADMIN — APIXABAN 5 MILLIGRAM(S): 2.5 TABLET, FILM COATED ORAL at 21:21

## 2023-06-06 RX ADMIN — ALBUTEROL 2 PUFF(S): 90 AEROSOL, METERED ORAL at 13:13

## 2023-06-06 RX ADMIN — Medication 250 MILLIGRAM(S): at 12:10

## 2023-06-06 RX ADMIN — INSULIN GLARGINE 55 UNIT(S): 100 INJECTION, SOLUTION SUBCUTANEOUS at 21:40

## 2023-06-06 RX ADMIN — MEXILETINE HYDROCHLORIDE 200 MILLIGRAM(S): 150 CAPSULE ORAL at 13:45

## 2023-06-06 RX ADMIN — Medication 40 MILLIGRAM(S): at 05:53

## 2023-06-06 RX ADMIN — Medication 4: at 21:24

## 2023-06-06 RX ADMIN — Medication 5 UNIT(S): at 17:55

## 2023-06-06 RX ADMIN — PANTOPRAZOLE SODIUM 40 MILLIGRAM(S): 20 TABLET, DELAYED RELEASE ORAL at 05:53

## 2023-06-06 RX ADMIN — Medication 5 UNIT(S): at 13:46

## 2023-06-06 RX ADMIN — Medication 4 MILLILITER(S): at 09:42

## 2023-06-06 RX ADMIN — Medication 600 MILLIGRAM(S): at 12:10

## 2023-06-06 RX ADMIN — APIXABAN 5 MILLIGRAM(S): 2.5 TABLET, FILM COATED ORAL at 12:09

## 2023-06-06 NOTE — DIETITIAN INITIAL EVALUATION ADULT - PERTINENT MEDS FT
MEDICATIONS  (STANDING):  acetylcysteine 10%  Inhalation 4 milliLiter(s) Inhalation three times a day  albuterol/ipratropium for Nebulization 3 milliLiter(s) Nebulizer every 6 hours  apixaban 5 milliGRAM(s) Oral every 12 hours  atorvastatin 20 milliGRAM(s) Oral at bedtime  aztreonam  IVPB 1000 milliGRAM(s) IV Intermittent every 8 hours  buDESOnide    Inhalation Suspension 0.5 milliGRAM(s) Inhalation two times a day  dextrose 5%. 1000 milliLiter(s) (50 mL/Hr) IV Continuous <Continuous>  dextrose 5%. 1000 milliLiter(s) (100 mL/Hr) IV Continuous <Continuous>  dextrose 50% Injectable 50 milliLiter(s) IV Push every 15 minutes  dextrose 50% Injectable 25 milliLiter(s) IV Push every 15 minutes  dextrose 50% Injectable 25 Gram(s) IV Push once  dextrose 50% Injectable 12.5 Gram(s) IV Push once  dextrose 50% Injectable 25 Gram(s) IV Push once  glucagon  Injectable 1 milliGRAM(s) IntraMuscular once  guaiFENesin  milliGRAM(s) Oral every 12 hours  insulin glargine Injectable (LANTUS) 45 Unit(s) SubCutaneous at bedtime  insulin lispro (ADMELOG) corrective regimen sliding scale   SubCutaneous Before meals and at bedtime  insulin lispro Injectable (ADMELOG) 5 Unit(s) SubCutaneous three times a day before meals  methylPREDNISolone 4 milliGRAM(s) Oral daily  mexiletine 200 milliGRAM(s) Oral every 8 hours  montelukast 10 milliGRAM(s) Oral at bedtime  pantoprazole    Tablet 40 milliGRAM(s) Oral before breakfast  trimethoprim  160 mG/sulfamethoxazole 800 mG 1 Tablet(s) Oral daily  vancomycin  IVPB 1000 milliGRAM(s) IV Intermittent every 12 hours    MEDICATIONS  (PRN):  albuterol    90 MICROgram(s) HFA Inhaler 2 Puff(s) Inhalation every 4 hours PRN Shortness of Breath and/or Wheezing  dextrose Oral Gel 15 Gram(s) Oral once PRN Blood Glucose LESS THAN 70 milliGRAM(s)/deciliter  ondansetron   Disintegrating Tablet 4 milliGRAM(s) Oral every 6 hours PRN Nausea

## 2023-06-06 NOTE — DIETITIAN INITIAL EVALUATION ADULT - ADD RECOMMEND
1) c/w current diet rx - maintain POCT between 140- 180 mg/dL, 2) Encourage protein-rich foods, maximize food preferences, 3) add glucerna shakes TID as per pt request to meet increased nutrient needs, 4) MVI w/ minerals daily to ensure 100% RDA met, 5) Consider adding thiamine 100 mg daily 2/2 poor PO intake/ malnutrition, 6) Monitor bowel movements, if no BM for >3 days, consider implementing bowel regimen, 7) Obtain vitamin D 25OH level to assess nutriture, 8) consider checking B6, B12, thiamine, folate, carnitine, and copper levels as malnutrition in cause these to be deficient, 9) monitor lytes/ min and replete prn, 10) daily wts to track/trend changes. RD will continue to monitor PO intake, labs, hydration, and wt prn.  1) c/w current diet rx - maintain POCT between 140- 180 mg/dL, 2) Encourage protein-rich foods, maximize food preferences, 3) add glucerna shakes TID as per pt request to meet increased nutrient needs, 4) Recommend to add MVI w/minerals, Vit C 500 mg BID, add Zinc Sulfate 220 mg x 10 days to promote wound healing, 5) Consider adding thiamine 100 mg daily 2/2 poor PO intake/ malnutrition, 6) Monitor bowel movements, if no BM for >3 days, consider implementing bowel regimen, 7) Obtain vitamin D 25OH level to assess nutriture, 8) consider checking B6, B12, thiamine, folate, carnitine, and copper levels as malnutrition in cause these to be deficient, 9) monitor lytes/ min and replete prn, 10) daily wts to track/trend changes. RD will continue to monitor PO intake, labs, hydration, and wt prn.

## 2023-06-06 NOTE — DIETITIAN INITIAL EVALUATION ADULT - OTHER INFO
73 YO F PMHx  asthma, diabetes, paroxysmal A-fib on Eliquis colorectal cancer many years ago status post colostomy presenting with concern for shortness of breath.  States that she was recently discharged a few weeks ago from this hospital for parainfluenza.  Since being home she had done well and then 2 days ago she began to feel chest tightness with shortness of breath again.  States she has not had her Mucomyst which she feels may be contributing to her symptoms. Has been having productive cough similar to previous episodes.  Still taking steroids. 6/5: Pt sitting in chair, c/o of malaise, SOB.    Pt in chair at time of RD visit - has been seen by RD service on multiple past admits. Has previously met criteria for PCM and continues to meet criteria. NFPE reveals varied degrees of muscle/ fat wasting. Wt hx as per EMR: 147# on 5/9/23 - with mild edema; 140# (taken by RD on 3/6/23), however ? accuracy of wt as per EMR, 134# on 1/6/23; 122# on 12/15/22; 137# on 11/26/22; 152# on 11/14/22 (possible fluid retention); 154# on 10/17/22; 144# on 9/24/22; 148# on 9/7/22 (possible fluid retention); wt fluctuates 2/2 fluid retention. Pt reports UBW stable between 140- 145#, RD took bed scale wt on 6/6 at 139#. No pertinent wt changes at this time. On consistent CHO diet, would continue as POCT elevated. Will provide glucerna TID as requested. See other recs below.

## 2023-06-06 NOTE — DIETITIAN INITIAL EVALUATION ADULT - PERTINENT LABORATORY DATA
06-06    142  |  110<H>  |  23  ----------------------------<  243<H>  4.4   |  28  |  0.59    Ca    8.4<L>      06 Jun 2023 05:35  Phos  2.6     06-06  Mg     2.1     06-06    TPro  6.7  /  Alb  3.2<L>  /  TBili  0.3  /  DBili  x   /  AST  19  /  ALT  25  /  AlkPhos  114  06-05  POCT Blood Glucose.: 198 mg/dL (06-06-23 @ 08:46)  A1C with Estimated Average Glucose Result: 9.4 % (05-12-23 @ 07:40)  A1C with Estimated Average Glucose Result: 10.3 % (03-05-23 @ 06:35)  A1C with Estimated Average Glucose Result: 8.6 % (12-15-22 @ 06:19)

## 2023-06-06 NOTE — DIETITIAN INITIAL EVALUATION ADULT - NSFNSGIIOFT_GEN_A_CORE
I&O's Detail    05 Jun 2023 07:01  -  06 Jun 2023 07:00  --------------------------------------------------------  IN:    IV PiggyBack: 300 mL    Lactated Ringers: 455 mL    Oral Fluid: 440 mL  Total IN: 1195 mL    OUT:  Total OUT: 0 mL    Total NET: 1195 mL

## 2023-06-06 NOTE — DIETITIAN NUTRITION RISK NOTIFICATION - TREATMENT: THE FOLLOWING DIET HAS BEEN RECOMMENDED
Diet, Consistent Carbohydrate w/Evening Snack:   Supplement Feeding Modality:  Oral  Glucerna Shake Cans or Servings Per Day:  1       Frequency:  Three Times a day (06-06-23 @ 09:50) [Active]

## 2023-06-06 NOTE — DIETITIAN INITIAL EVALUATION ADULT - NS FNS DIET ORDER
Diet, Consistent Carbohydrate w/Evening Snack:   Supplement Feeding Modality:  Oral  Glucerna Shake Cans or Servings Per Day:  1       Frequency:  Three Times a day (06-06-23 @ 09:50)

## 2023-06-06 NOTE — PROGRESS NOTE ADULT - ASSESSMENT
73 yo PMHx  asthma, diabetes, paroxysmal A-fib on Eliquis colorectal cancer many years ago status post colostomy presenting with concern for shortness of breath. States that she was recently discharged a few weeks ago from this hospital for parainfluenza. Since being home she had done well and then 2 days ago she began to feel chest tightness with shortness of breath again. States she has not had her Mucomyst which she feels may be contributing to her symptoms. Has been having productive cough similar to previous episodes. Still taking steroids In OBS O/N Increasing lactic acid, serum glucose and decreasing serum bicarbonate prompting ICU consult. Imaging shows interval improvement of infectious/inflammatory bronchiolitis/bronchitis. Mild residual clusters of tree in bud type nodularity are seen at the right middle and right lower lobes inferiorly. Was given merrem developed flushing on face/rash/itchiness, concern for allergy raised, abx discontinued.     1. Dyspnea. RLL resolving pneumonia. Bronchiolitis. Asthma exacerbation. PCN allergy.   - imaging reviewed  - dc merrem possible allergic rxn  - on vancomycin [id number] check trough prior to 4th dose #2  - on aztreonam 1gmq8h #2   - continue with antibiotic coverage  - on steroids  - fu cultures - blood cx no growth   - monitor temps  - tolerating abx well so far; no side effects noted  - reason for abx use and side effects reviewed with patient  - pulmonary f/u noted   - supportive care  - fu cbc    2. other issues - care per medicine

## 2023-06-06 NOTE — PROGRESS NOTE ADULT - ASSESSMENT
Patient with hx. asthma, diabetes was transferred to ICU with hyperglycemia  never actually DKA.  did not require insulin drip  on home dose  no wheezing, will dec steroids to home dose, to help with blood sugars  on abx. vanco, aztreonam, minimal infiltrate on ct  bactim pateint was on at home for pcp prophylaxis  can transfer to floor  on home regiment of insulin 45 qhs glucose better Patient with hx. asthma, diabetes was transferred to ICU with hyperglycemia  never actually DKA.  did not require insulin drip  on home dose  no wheezing, will dec steroids to home dose, to help with blood sugars  on abx. vanco, aztreonam, minimal infiltrate on ct  bactim pateint was on at home for pcp prophylaxis  can transfer to floor signed out to Dr. Stoll  on home regiment of insulin 45 qhs glucose better

## 2023-06-06 NOTE — DIETITIAN INITIAL EVALUATION ADULT - ORAL INTAKE PTA/DIET HISTORY
eats 3 small meals daily with 3 Glucerna shakes TID  Lives w/ DTR who cooks/ shops for pt currently - was in [place] at [facility] previously   Wears upper and low dentures - denies difficulty chewing/ swallowing difficulties

## 2023-06-07 LAB
-  AMIKACIN: SIGNIFICANT CHANGE UP
-  AMOXICILLIN/CLAVULANIC ACID: SIGNIFICANT CHANGE UP
-  AMPICILLIN/SULBACTAM: SIGNIFICANT CHANGE UP
-  AMPICILLIN: SIGNIFICANT CHANGE UP
-  AZTREONAM: SIGNIFICANT CHANGE UP
-  CEFAZOLIN: SIGNIFICANT CHANGE UP
-  CEFEPIME: SIGNIFICANT CHANGE UP
-  CEFTRIAXONE: SIGNIFICANT CHANGE UP
-  CEFUROXIME: SIGNIFICANT CHANGE UP
-  CIPROFLOXACIN: SIGNIFICANT CHANGE UP
-  ERTAPENEM: SIGNIFICANT CHANGE UP
-  GENTAMICIN: SIGNIFICANT CHANGE UP
-  LEVOFLOXACIN: SIGNIFICANT CHANGE UP
-  MEROPENEM: SIGNIFICANT CHANGE UP
-  NITROFURANTOIN: SIGNIFICANT CHANGE UP
-  PIPERACILLIN/TAZOBACTAM: SIGNIFICANT CHANGE UP
-  TOBRAMYCIN: SIGNIFICANT CHANGE UP
-  TRIMETHOPRIM/SULFAMETHOXAZOLE: SIGNIFICANT CHANGE UP
CULTURE RESULTS: SIGNIFICANT CHANGE UP
GLUCOSE BLDC GLUCOMTR-MCNC: 141 MG/DL — HIGH (ref 70–99)
GLUCOSE BLDC GLUCOMTR-MCNC: 168 MG/DL — HIGH (ref 70–99)
GLUCOSE BLDC GLUCOMTR-MCNC: 229 MG/DL — HIGH (ref 70–99)
GLUCOSE BLDC GLUCOMTR-MCNC: 235 MG/DL — HIGH (ref 70–99)
METHOD TYPE: SIGNIFICANT CHANGE UP
ORGANISM # SPEC MICROSCOPIC CNT: SIGNIFICANT CHANGE UP
ORGANISM # SPEC MICROSCOPIC CNT: SIGNIFICANT CHANGE UP
SPECIMEN SOURCE: SIGNIFICANT CHANGE UP
TROPONIN I, HIGH SENSITIVITY RESULT: 28.08 NG/L — SIGNIFICANT CHANGE UP

## 2023-06-07 PROCEDURE — 99233 SBSQ HOSP IP/OBS HIGH 50: CPT

## 2023-06-07 RX ORDER — POLYETHYLENE GLYCOL 3350 17 G/17G
17 POWDER, FOR SOLUTION ORAL DAILY
Refills: 0 | Status: DISCONTINUED | OUTPATIENT
Start: 2023-06-07 | End: 2023-06-16

## 2023-06-07 RX ADMIN — Medication 1 TABLET(S): at 11:19

## 2023-06-07 RX ADMIN — Medication 4 MILLILITER(S): at 09:29

## 2023-06-07 RX ADMIN — Medication 250 MILLIGRAM(S): at 11:19

## 2023-06-07 RX ADMIN — Medication 4: at 12:18

## 2023-06-07 RX ADMIN — ATORVASTATIN CALCIUM 20 MILLIGRAM(S): 80 TABLET, FILM COATED ORAL at 21:22

## 2023-06-07 RX ADMIN — MEXILETINE HYDROCHLORIDE 200 MILLIGRAM(S): 150 CAPSULE ORAL at 13:35

## 2023-06-07 RX ADMIN — Medication 0.5 MILLIGRAM(S): at 09:29

## 2023-06-07 RX ADMIN — Medication 4 MILLILITER(S): at 13:32

## 2023-06-07 RX ADMIN — Medication 4 MILLILITER(S): at 20:11

## 2023-06-07 RX ADMIN — PANTOPRAZOLE SODIUM 40 MILLIGRAM(S): 20 TABLET, DELAYED RELEASE ORAL at 05:59

## 2023-06-07 RX ADMIN — Medication 4: at 22:01

## 2023-06-07 RX ADMIN — Medication 50 MILLIGRAM(S): at 05:47

## 2023-06-07 RX ADMIN — Medication 600 MILLIGRAM(S): at 11:17

## 2023-06-07 RX ADMIN — Medication 4 MILLIGRAM(S): at 11:18

## 2023-06-07 RX ADMIN — Medication 50 MILLIGRAM(S): at 13:36

## 2023-06-07 RX ADMIN — Medication 50 MILLIGRAM(S): at 22:03

## 2023-06-07 RX ADMIN — APIXABAN 5 MILLIGRAM(S): 2.5 TABLET, FILM COATED ORAL at 21:21

## 2023-06-07 RX ADMIN — Medication 3 MILLILITER(S): at 13:33

## 2023-06-07 RX ADMIN — Medication 600 MILLIGRAM(S): at 21:23

## 2023-06-07 RX ADMIN — MEXILETINE HYDROCHLORIDE 200 MILLIGRAM(S): 150 CAPSULE ORAL at 21:26

## 2023-06-07 RX ADMIN — Medication 5 UNIT(S): at 08:40

## 2023-06-07 RX ADMIN — Medication 5 UNIT(S): at 17:33

## 2023-06-07 RX ADMIN — Medication 12 MILLIGRAM(S): at 13:36

## 2023-06-07 RX ADMIN — Medication 250 MILLIGRAM(S): at 21:26

## 2023-06-07 RX ADMIN — POLYETHYLENE GLYCOL 3350 17 GRAM(S): 17 POWDER, FOR SOLUTION ORAL at 11:19

## 2023-06-07 RX ADMIN — MEXILETINE HYDROCHLORIDE 200 MILLIGRAM(S): 150 CAPSULE ORAL at 05:47

## 2023-06-07 RX ADMIN — ALBUTEROL 2 PUFF(S): 90 AEROSOL, METERED ORAL at 12:35

## 2023-06-07 RX ADMIN — Medication 0.5 MILLIGRAM(S): at 20:10

## 2023-06-07 RX ADMIN — Medication 3 MILLILITER(S): at 09:29

## 2023-06-07 RX ADMIN — INSULIN GLARGINE 55 UNIT(S): 100 INJECTION, SOLUTION SUBCUTANEOUS at 21:59

## 2023-06-07 RX ADMIN — Medication 3 MILLILITER(S): at 01:46

## 2023-06-07 RX ADMIN — Medication 5 UNIT(S): at 12:17

## 2023-06-07 RX ADMIN — Medication 2: at 08:41

## 2023-06-07 RX ADMIN — Medication 3 MILLILITER(S): at 20:10

## 2023-06-07 RX ADMIN — Medication 4 MILLILITER(S): at 01:46

## 2023-06-07 RX ADMIN — MONTELUKAST 10 MILLIGRAM(S): 4 TABLET, CHEWABLE ORAL at 21:24

## 2023-06-07 RX ADMIN — APIXABAN 5 MILLIGRAM(S): 2.5 TABLET, FILM COATED ORAL at 11:18

## 2023-06-07 NOTE — PHYSICAL THERAPY INITIAL EVALUATION ADULT - PERTINENT HX OF CURRENT PROBLEM, REHAB EVAL
Pt c/o malaise, SOB Pt c/o malaise, increased shortness of breath at home , pt recently hospitalized 5/4/23 - and discharged to home with [facility].

## 2023-06-07 NOTE — PHYSICAL THERAPY INITIAL EVALUATION ADULT - NSPTDISCHREC_GEN_A_CORE
pt has had extensive [place] over past 15 months and may have exhausted benefit , opted for home care after last hospitalization May 2023/Home PT

## 2023-06-07 NOTE — PHYSICAL THERAPY INITIAL EVALUATION ADULT - IMPAIRMENTS FOUND, PT EVAL
pt reports h/o L foot drop that developed while at [facility] in past with improved AROM/motor function since onset but not WNL/aerobic capacity/endurance/gait, locomotion, and balance/integumentary integrity/muscle strength/ROM

## 2023-06-07 NOTE — PHYSICAL THERAPY INITIAL EVALUATION ADULT - MARITAL STATUS
pt relocated to [place] 8 years ago from [facility] to care for sister with cancer/Single

## 2023-06-07 NOTE — PHYSICAL THERAPY INITIAL EVALUATION ADULT - PATIENT PROFILE REVIEW, REHAB EVAL
yes pt with h/o aortic dissection s/p Bentall procedure & hemiarch replacement Sept 2022 (Dr Cabrera); pt with h/o R foot OM/abscess /+MRSA July 2022 s/p I&D /wound debridement R foot @ [facility] c/b medication induced anaphylaxis due to Cefepime /PCN allergy, also V.Tach requiring EPI/Lidocaine ,intubation/sedation on vent due to hypoxia /to protect airway at that time, successfully extubated/yes

## 2023-06-07 NOTE — PHYSICAL THERAPY INITIAL EVALUATION ADULT - LEVEL OF INDEPENDENCE: STAND/SIT, REHAB EVAL
out of bed to chair after encounter michaela FITCH in place , REFUSES to allow chair alarm/contact guard/stand-by assist

## 2023-06-07 NOTE — PHYSICAL THERAPY INITIAL EVALUATION ADULT - LEVEL OF INDEPENDENCE: SIT/SUPINE, REHAB EVAL
out of bed to chair after encounter (REFUSES CHAIR ALARM ,RN NOTIFIED) insists she is not stupid and will not get up without first requesting assistance

## 2023-06-07 NOTE — PROGRESS NOTE ADULT - ASSESSMENT
72F hx asthma on chronic steroids, DM, pAfib on Eliquis, colorectal ca yrs ago s/p colostomy, recent admission for parainfluenza, PNA. P/w SOB, and elevated BG, not in DKA.    #SOB 2/2 asthma exacerbation  -on RA  -CT chest improved  -most recent steroid dose at home medrol 12mg (per pt and recent taper prescribed on d/c 5/22), will resume medrol at 16mg given worsening SOB, will need repeat taper on d/c  -5d broad spectrum abx, to end 6/9  -nebs  -appreciate pulm and ID input    #IDDM  -A1c 9.4  -home insulin regimen, SSI  -monitor BG while on steroids    #PCP ppx prevention given chronic steroids  -Bactrim    #Afib  -Eliquis, Mexilitine    #DVT ppx- full a/c w Eliquis    PT eval    Poss d/c 6/9 or 6/10 after completing abx course

## 2023-06-07 NOTE — PHYSICAL THERAPY INITIAL EVALUATION ADULT - LIVES WITH, PROFILE
pt resides in [facility] style home in [place] with her dtr Zoe Flores , there are 14 steps to enter/children pt resides in [facility] style home in [place] with her dtr Zoe Flores , there are 14 steps to enter, she reports daughter will be returning to work later this month and this presents a problem re: care during days/children

## 2023-06-07 NOTE — PHYSICAL THERAPY INITIAL EVALUATION ADULT - NSACTIVITYREC_GEN_A_PT
out of bed to chair daily (ie-all meals) amb with RW /close supervision in room while she remains on isolation or in cast[place] with PPE including mask/gown , pt is uncomfortable amb in castillo or being near other patients as she fears she will catch something transmissable .Daily amb with RW , progress to stair climbing prior to DC , rests as needed  impaired endurance , perceived dyspnea on exertion

## 2023-06-07 NOTE — PHYSICAL THERAPY INITIAL EVALUATION ADULT - GAIT DISTANCE, PT EVAL
50ft +50ft +30ft =130ft with standing rests due to c/o dyspnea on exertion ,checked O2 SAT with amb on RA during 2nd standing rest (after 100ft) =97%

## 2023-06-07 NOTE — PHYSICAL THERAPY INITIAL EVALUATION ADULT - MANUAL MUSCLE TESTING RESULTS, REHAB EVAL
WFL BUE/BLEs except L ankle DF diminished vs R in the 3/5 range ,and pt reports h/o L foot drop while at [facility]

## 2023-06-07 NOTE — PHYSICAL THERAPY INITIAL EVALUATION ADULT - GENERAL OBSERVATIONS, REHAB EVAL
supine lying in bed ,wearing adult diaper she complains is wet and needs to be changed prior to out of bed , +exopthalmia noted B eyes , dysconjugate gaze ,R eye appears cloudy/?cataract

## 2023-06-07 NOTE — PHYSICAL THERAPY INITIAL EVALUATION ADULT - PATIENT/FAMILY/SIGNIFICANT OTHER GOALS STATEMENT, PT EVAL
pt is very cautious/anxious about catching another "bug" ,blames all her infections on hospital acquired infections including Covid November 2022, PNA ,Parainfluenza recently May 2023 ,is reluctant to walk in hallway, prefers to do laps in her room

## 2023-06-07 NOTE — PROGRESS NOTE ADULT - ASSESSMENT
- Insulin Drip D/C'ed  - Broad spectrum Abx-Vanco/Aztreonam- per ID  - IV solumedrol discontinued, now Methylprednisolone 4mg daily (home dose)   - Aerosols with Duoneb/Budesonide  - Mucomyst  - Chest PT  - Patient to bring in Smart Vest for use BID  - Mucinex  - OOB to chair  - Remains on Bactrim for PCP PPx    Problem/Recommendation - 1:  ·  Acute hypoxemic respiratory failure.   Problem/Recommendation - 2:  ·  Asthma, severe.   Problem/Recommendation - 3:  ·  Bronchiectasis.   Problem/Recommendation - 4:  ·  Tracheobronchomalacia.   Problem/Recommendation - 5:  ·  Dyspnea.   Problem/Recommendation - 6:  ·  Diabetic ketoacidosis.

## 2023-06-07 NOTE — PHYSICAL THERAPY INITIAL EVALUATION ADULT - ADDITIONAL COMMENTS
pt uses a rollator to ambulate , also owns a shower chair; she had attended [facility] prior to last admission 5/4/23 pt uses a rollator to ambulate , also owns a shower chair; she had attended [facility] on/off in-between hospitalizations since 11/30/22 up thru last admission 5/4/23.Pt reports after ~6 months was transferred to LTC unit and no longer receiving rehab services regularly so refused to return after last hospitalization and was discharged home with [facility] .Pt had also attended [facility] first early 2022 and then [facility] July 2022

## 2023-06-07 NOTE — CHART NOTE - NSCHARTNOTEFT_GEN_A_CORE
RN called as pt with +ESBL in urine culture  Pt already with ID on board and with aztreonam   Already on isolation for ESBL in sputum  Continue current management

## 2023-06-07 NOTE — PHYSICAL THERAPY INITIAL EVALUATION ADULT - SKIN INTEGRITY
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

## 2023-06-07 NOTE — PHYSICAL THERAPY INITIAL EVALUATION ADULT - PRECAUTIONS/LIMITATIONS, REHAB EVAL
h/o ESBL sputum/urine 5/13/23, moderate proteus mirabilis in sputum cx 5/13/isolation precautions h/o ESBL sputum/urine 5/13/23, moderate proteus mirabilis in sputum cx 5/13/fall precautions/isolation precautions h/o ESBL sputum/urine 5/13/23, urine cx 6/5 +proteus mirabilis /ESBL ; moderate proteus mirabilis in sputum cx 5/13/fall precautions/isolation precautions

## 2023-06-07 NOTE — PROGRESS NOTE ADULT - ASSESSMENT
73 yo PMHx  asthma, diabetes, paroxysmal A-fib on Eliquis colorectal cancer many years ago status post colostomy presenting with concern for shortness of breath. States that she was recently discharged a few weeks ago from this hospital for parainfluenza. Since being home she had done well and then 2 days ago she began to feel chest tightness with shortness of breath again. States she has not had her Mucomyst which she feels may be contributing to her symptoms. Has been having productive cough similar to previous episodes. Still taking steroids In OBS O/N Increasing lactic acid, serum glucose and decreasing serum bicarbonate prompting ICU consult. Imaging shows interval improvement of infectious/inflammatory bronchiolitis/bronchitis. Mild residual clusters of tree in bud type nodularity are seen at the right middle and right lower lobes inferiorly. Was given merrem developed flushing on face/rash/itchiness, concern for allergy raised, abx discontinued.     1. Dyspnea. RLL resolving pneumonia. Bronchiolitis. Asthma exacerbation. PCN allergy.   - imaging reviewed  - on vancomycin [id number] trough therapeutic #3  - on aztreonam 1gmq8h #3  - continue with antibiotic coverage  - on steroids  - on pcp prophylaxis as on chronic steroids   - fu cultures - blood cx no growth   - monitor temps  - tolerating abx well so far; no side effects noted  - reason for abx use and side effects reviewed with patient  - pulmonary f/u noted   - supportive care  - fu cbc    2. other issues - care per medicine

## 2023-06-08 ENCOUNTER — APPOINTMENT (OUTPATIENT)
Dept: ELECTROPHYSIOLOGY | Facility: CLINIC | Age: 73
End: 2023-06-08

## 2023-06-08 ENCOUNTER — APPOINTMENT (OUTPATIENT)
Dept: CARDIOLOGY | Facility: CLINIC | Age: 73
End: 2023-06-08

## 2023-06-08 LAB
GLUCOSE BLDC GLUCOMTR-MCNC: 238 MG/DL — HIGH (ref 70–99)
GLUCOSE BLDC GLUCOMTR-MCNC: 289 MG/DL — HIGH (ref 70–99)
GLUCOSE BLDC GLUCOMTR-MCNC: 297 MG/DL — HIGH (ref 70–99)
GLUCOSE BLDC GLUCOMTR-MCNC: 74 MG/DL — SIGNIFICANT CHANGE UP (ref 70–99)

## 2023-06-08 PROCEDURE — 99232 SBSQ HOSP IP/OBS MODERATE 35: CPT

## 2023-06-08 PROCEDURE — 99233 SBSQ HOSP IP/OBS HIGH 50: CPT

## 2023-06-08 PROCEDURE — 93010 ELECTROCARDIOGRAM REPORT: CPT

## 2023-06-08 RX ADMIN — Medication 3 MILLILITER(S): at 08:40

## 2023-06-08 RX ADMIN — Medication 600 MILLIGRAM(S): at 11:14

## 2023-06-08 RX ADMIN — Medication 250 MILLIGRAM(S): at 22:29

## 2023-06-08 RX ADMIN — Medication 3 MILLILITER(S): at 20:53

## 2023-06-08 RX ADMIN — Medication 0.5 MILLIGRAM(S): at 08:38

## 2023-06-08 RX ADMIN — Medication 20 MILLIGRAM(S): at 21:19

## 2023-06-08 RX ADMIN — Medication 4 MILLILITER(S): at 08:41

## 2023-06-08 RX ADMIN — MEXILETINE HYDROCHLORIDE 200 MILLIGRAM(S): 150 CAPSULE ORAL at 05:56

## 2023-06-08 RX ADMIN — MEXILETINE HYDROCHLORIDE 200 MILLIGRAM(S): 150 CAPSULE ORAL at 14:48

## 2023-06-08 RX ADMIN — Medication 6: at 18:30

## 2023-06-08 RX ADMIN — Medication 5 UNIT(S): at 14:39

## 2023-06-08 RX ADMIN — Medication 0.5 MILLIGRAM(S): at 20:51

## 2023-06-08 RX ADMIN — ATORVASTATIN CALCIUM 20 MILLIGRAM(S): 80 TABLET, FILM COATED ORAL at 21:19

## 2023-06-08 RX ADMIN — Medication 50 MILLIGRAM(S): at 21:20

## 2023-06-08 RX ADMIN — POLYETHYLENE GLYCOL 3350 17 GRAM(S): 17 POWDER, FOR SOLUTION ORAL at 11:36

## 2023-06-08 RX ADMIN — MONTELUKAST 10 MILLIGRAM(S): 4 TABLET, CHEWABLE ORAL at 22:13

## 2023-06-08 RX ADMIN — Medication 20 MILLIGRAM(S): at 11:14

## 2023-06-08 RX ADMIN — Medication 5 UNIT(S): at 18:29

## 2023-06-08 RX ADMIN — Medication 250 MILLIGRAM(S): at 11:19

## 2023-06-08 RX ADMIN — MEXILETINE HYDROCHLORIDE 200 MILLIGRAM(S): 150 CAPSULE ORAL at 21:19

## 2023-06-08 RX ADMIN — Medication 4 MILLILITER(S): at 20:53

## 2023-06-08 RX ADMIN — APIXABAN 5 MILLIGRAM(S): 2.5 TABLET, FILM COATED ORAL at 21:19

## 2023-06-08 RX ADMIN — PANTOPRAZOLE SODIUM 40 MILLIGRAM(S): 20 TABLET, DELAYED RELEASE ORAL at 06:36

## 2023-06-08 RX ADMIN — INSULIN GLARGINE 55 UNIT(S): 100 INJECTION, SOLUTION SUBCUTANEOUS at 21:18

## 2023-06-08 RX ADMIN — Medication 4: at 14:40

## 2023-06-08 RX ADMIN — APIXABAN 5 MILLIGRAM(S): 2.5 TABLET, FILM COATED ORAL at 11:14

## 2023-06-08 RX ADMIN — Medication 600 MILLIGRAM(S): at 21:19

## 2023-06-08 RX ADMIN — Medication 3 MILLILITER(S): at 14:16

## 2023-06-08 RX ADMIN — Medication 1 TABLET(S): at 11:14

## 2023-06-08 RX ADMIN — Medication 50 MILLIGRAM(S): at 05:56

## 2023-06-08 RX ADMIN — Medication 50 MILLIGRAM(S): at 14:51

## 2023-06-08 RX ADMIN — Medication 3 MILLILITER(S): at 02:37

## 2023-06-08 RX ADMIN — Medication 6: at 21:18

## 2023-06-08 NOTE — PROGRESS NOTE ADULT - ASSESSMENT
73 yo PMHx  asthma, diabetes, paroxysmal A-fib on Eliquis colorectal cancer many years ago status post colostomy presenting with concern for shortness of breath. States that she was recently discharged a few weeks ago from this hospital for parainfluenza. Since being home she had done well and then 2 days ago she began to feel chest tightness with shortness of breath again. States she has not had her Mucomyst which she feels may be contributing to her symptoms. Has been having productive cough similar to previous episodes. Still taking steroids In OBS O/N Increasing lactic acid, serum glucose and decreasing serum bicarbonate prompting ICU consult. Imaging shows interval improvement of infectious/inflammatory bronchiolitis/bronchitis. Mild residual clusters of tree in bud type nodularity are seen at the right middle and right lower lobes inferiorly. Was given merrem developed flushing on face/rash/itchiness, concern for allergy raised, abx discontinued.     1. Dyspnea. RLL resolving pneumonia. Bronchiolitis. Asthma exacerbation. PCN allergy.   - imaging reviewed  - on vancomycin [id number] trough therapeutic #4  - on aztreonam 1gmq8h #4  - continue with antibiotic coverage, complete 5 day course   - on steroids  - on pcp prophylaxis as on chronic steroids   - fu cultures - blood cx no growth   - monitor temps  - tolerating abx well so far; no side effects noted  - reason for abx use and side effects reviewed with patient  - pulmonary f/u noted   - supportive care  - fu cbc    2. other issues - care per medicine

## 2023-06-08 NOTE — PHYSICAL THERAPY INITIAL EVALUATION ADULT - HEALTH SCREEN CRITERIA
[facility] is faxing for a refill, no change in medication, The medication(s) are set up and waiting for your approval.     Preferred pharmacy has been setup and verified  
escitalopram (LEXAPRO) 5 MG tablet    Refill protocol met.     Last refill date reviewed. 12/14/2022    Refill sent to preferred pharmacy.   
yes

## 2023-06-08 NOTE — PROGRESS NOTE ADULT - ASSESSMENT
72F hx asthma on chronic steroids, DM, pAfib on Eliquis, colorectal ca yrs ago s/p colostomy, recent admission for parainfluenza, PNA. P/w SOB, and elevated BG, not in DKA.    #SOB 2/2 asthma exacerbation  -on RA  -CT chest improved  -steroids now methylpred 20iv q12 per pulm f/u pulm recs, will need prolonged taper  -5d broad spectrum abx, to end 6/9  -nebs  -appreciate pulm and ID input    #IDDM  -A1c 9.4  -home insulin regimen, SSI  -monitor BG while on steroids    #PCP ppx prevention given chronic steroids  -Bactrim    #Afib  -Eliquis, Mexilitine    #DVT ppx- full a/c w Eliquis    PT eval    Dispo pending clinical improvement, >48hrs, needs prolonged steroid taper

## 2023-06-09 ENCOUNTER — TRANSCRIPTION ENCOUNTER (OUTPATIENT)
Age: 73
End: 2023-06-09

## 2023-06-09 LAB
GLUCOSE BLDC GLUCOMTR-MCNC: 125 MG/DL — HIGH (ref 70–99)
GLUCOSE BLDC GLUCOMTR-MCNC: 206 MG/DL — HIGH (ref 70–99)
GLUCOSE BLDC GLUCOMTR-MCNC: 232 MG/DL — HIGH (ref 70–99)
GLUCOSE BLDC GLUCOMTR-MCNC: 291 MG/DL — HIGH (ref 70–99)
GLUCOSE BLDC GLUCOMTR-MCNC: 63 MG/DL — LOW (ref 70–99)
GLUCOSE BLDC GLUCOMTR-MCNC: 67 MG/DL — LOW (ref 70–99)
GLUCOSE BLDC GLUCOMTR-MCNC: 69 MG/DL — LOW (ref 70–99)
GRAM STN FLD: SIGNIFICANT CHANGE UP
SPECIMEN SOURCE: SIGNIFICANT CHANGE UP

## 2023-06-09 PROCEDURE — 99233 SBSQ HOSP IP/OBS HIGH 50: CPT

## 2023-06-09 RX ORDER — INSULIN GLARGINE 100 [IU]/ML
50 INJECTION, SOLUTION SUBCUTANEOUS AT BEDTIME
Refills: 0 | Status: DISCONTINUED | OUTPATIENT
Start: 2023-06-09 | End: 2023-06-16

## 2023-06-09 RX ADMIN — Medication 6: at 18:25

## 2023-06-09 RX ADMIN — PANTOPRAZOLE SODIUM 40 MILLIGRAM(S): 20 TABLET, DELAYED RELEASE ORAL at 05:45

## 2023-06-09 RX ADMIN — Medication 50 MILLIGRAM(S): at 05:46

## 2023-06-09 RX ADMIN — Medication 20 MILLIGRAM(S): at 21:31

## 2023-06-09 RX ADMIN — MEXILETINE HYDROCHLORIDE 200 MILLIGRAM(S): 150 CAPSULE ORAL at 13:31

## 2023-06-09 RX ADMIN — APIXABAN 5 MILLIGRAM(S): 2.5 TABLET, FILM COATED ORAL at 09:21

## 2023-06-09 RX ADMIN — Medication 3 MILLILITER(S): at 02:40

## 2023-06-09 RX ADMIN — Medication 250 MILLIGRAM(S): at 21:34

## 2023-06-09 RX ADMIN — Medication 4 MILLILITER(S): at 02:38

## 2023-06-09 RX ADMIN — APIXABAN 5 MILLIGRAM(S): 2.5 TABLET, FILM COATED ORAL at 21:29

## 2023-06-09 RX ADMIN — Medication 0.5 MILLIGRAM(S): at 20:38

## 2023-06-09 RX ADMIN — POLYETHYLENE GLYCOL 3350 17 GRAM(S): 17 POWDER, FOR SOLUTION ORAL at 09:20

## 2023-06-09 RX ADMIN — Medication 3 MILLILITER(S): at 20:38

## 2023-06-09 RX ADMIN — Medication 600 MILLIGRAM(S): at 21:29

## 2023-06-09 RX ADMIN — Medication 20 MILLIGRAM(S): at 09:20

## 2023-06-09 RX ADMIN — Medication 1 TABLET(S): at 09:21

## 2023-06-09 RX ADMIN — Medication 3 MILLILITER(S): at 10:06

## 2023-06-09 RX ADMIN — Medication 250 MILLIGRAM(S): at 09:20

## 2023-06-09 RX ADMIN — Medication 4 MILLILITER(S): at 10:06

## 2023-06-09 RX ADMIN — INSULIN GLARGINE 50 UNIT(S): 100 INJECTION, SOLUTION SUBCUTANEOUS at 21:30

## 2023-06-09 RX ADMIN — ATORVASTATIN CALCIUM 20 MILLIGRAM(S): 80 TABLET, FILM COATED ORAL at 21:29

## 2023-06-09 RX ADMIN — MEXILETINE HYDROCHLORIDE 200 MILLIGRAM(S): 150 CAPSULE ORAL at 21:30

## 2023-06-09 RX ADMIN — Medication 5 UNIT(S): at 18:25

## 2023-06-09 RX ADMIN — Medication 4: at 13:30

## 2023-06-09 RX ADMIN — Medication 50 MILLIGRAM(S): at 18:24

## 2023-06-09 RX ADMIN — MONTELUKAST 10 MILLIGRAM(S): 4 TABLET, CHEWABLE ORAL at 21:29

## 2023-06-09 RX ADMIN — Medication 600 MILLIGRAM(S): at 09:21

## 2023-06-09 RX ADMIN — Medication 0.5 MILLIGRAM(S): at 10:02

## 2023-06-09 RX ADMIN — Medication 5 UNIT(S): at 13:30

## 2023-06-09 RX ADMIN — Medication 3 MILLILITER(S): at 14:05

## 2023-06-09 RX ADMIN — Medication 4: at 21:30

## 2023-06-09 RX ADMIN — MEXILETINE HYDROCHLORIDE 200 MILLIGRAM(S): 150 CAPSULE ORAL at 05:45

## 2023-06-09 NOTE — PROGRESS NOTE ADULT - ASSESSMENT
73 yo PMHx  asthma, diabetes, paroxysmal A-fib on Eliquis colorectal cancer many years ago status post colostomy presenting with concern for shortness of breath. States that she was recently discharged a few weeks ago from this hospital for parainfluenza. Since being home she had done well and then 2 days ago she began to feel chest tightness with shortness of breath again. States she has not had her Mucomyst which she feels may be contributing to her symptoms. Has been having productive cough similar to previous episodes. Still taking steroids In OBS O/N Increasing lactic acid, serum glucose and decreasing serum bicarbonate prompting ICU consult. Imaging shows interval improvement of infectious/inflammatory bronchiolitis/bronchitis. Mild residual clusters of tree in bud type nodularity are seen at the right middle and right lower lobes inferiorly. Was given merrem developed flushing on face/rash/itchiness, concern for allergy raised, abx discontinued.     1. Dyspnea. RLL resolving pneumonia. Bronchiolitis. Asthma exacerbation. PCN allergy.   - imaging reviewed  - on vancomycin [id number] trough therapeutic #5  - on aztreonam 1gmq8h #5  - continue with antibiotic coverage, complete 5 day course   - on steroids  - on pcp prophylaxis as on chronic steroids   - fu cultures - blood cx no growth   - monitor temps  - tolerating abx well so far; no side effects noted  - reason for abx use and side effects reviewed with patient  - pulmonary f/u noted   - supportive care  - fu cbc    2. other issues - care per medicine

## 2023-06-09 NOTE — CHART NOTE - NSCHARTNOTEFT_GEN_A_CORE
Consult to address pt's diet  Pt on consistent Carb diet, and is complaining  Pt was found to be at risk for severe malnutrition  Consider liberalizing diet to regular  BG POCT went from 289   (6-08) to 69 (6-09)  on 6/9 Pt refused apple juice to increase BG  but was strongly encouraged to drink the juice  Pt reported that she would eat cereal and banana  BG to be retested Consult to address pt's diet  Pt on consistent Carb diet, and is complaining  Pt was found to be at risk for severe malnutrition  Consider liberalizing diet to regular  BG POCT went from 289   (6-08) to 69 (6-09)  on 6/9 Pt refused apple juice to increase BG  but was strongly encouraged to drink the juice  Pt reported that she would eat cereal and banana, added more OJ  BG retested, now 125

## 2023-06-09 NOTE — PROVIDER CONTACT NOTE (OTHER) - RECOMMENDATIONS
Pt drank apple juice in front of RN and was encouraged to eat breakfast. Consider workup for CP. Will re-check BGM at 9:15.

## 2023-06-09 NOTE — DISCHARGE NOTE NURSING/CASE MANAGEMENT/SOCIAL WORK - NSDCPEFALRISK_GEN_ALL_CORE
For information on Fall & Injury Prevention, visit: [email or web address] OR  [email or web address] OR  https://www.cdc.gov/steadi/patient.html

## 2023-06-09 NOTE — PROVIDER CONTACT NOTE (HYPOGLYCEMIA EVENT) - NS PROVIDER CONTACT BACKGROUND-HYPO
Age: 72y    Gender: Female    POCT Blood Glucose:  69 mg/dL (06-09-23 @ 08:39)  289 mg/dL (06-08-23 @ 20:39)  297 mg/dL (06-08-23 @ 18:01)  238 mg/dL (06-08-23 @ 13:30)      eMAR:atorvastatin   20 milliGRAM(s) Oral (06-08-23 @ 21:19)    insulin glargine Injectable (LANTUS)   55 Unit(s) SubCutaneous (06-08-23 @ 21:18)    insulin lispro (ADMELOG) corrective regimen sliding scale   6 Unit(s) SubCutaneous (06-08-23 @ 21:18)   6 Unit(s) SubCutaneous (06-08-23 @ 18:30)   4  SubCutaneous (06-08-23 @ 14:40)    insulin lispro Injectable (ADMELOG)   5 Unit(s) SubCutaneous (06-08-23 @ 18:29)   5 Unit(s) SubCutaneous (06-08-23 @ 14:39)    methylPREDNISolone sodium succinate Injectable   20 milliGRAM(s) IV Push (06-08-23 @ 21:19)   20 milliGRAM(s) IV Push (06-08-23 @ 11:14)

## 2023-06-09 NOTE — PROVIDER CONTACT NOTE (HYPOGLYCEMIA EVENT) - NS PROVIDER CONTACT RECOMMEND-HYPO
Pt refusing to drink apple juice; states she will eat her cereal with banana.   I will re-check BGM in 15 min. Will hold AM insulin.

## 2023-06-09 NOTE — PROVIDER CONTACT NOTE (OTHER) - ASSESSMENT
Pt resting in bed asymptomatic of hypoglycemia. Separately c/o substernal CP "since before breakfast" but didn't notify RN until now. Pt rates pain 8/10 and characterizes it as dull with no radiation.

## 2023-06-09 NOTE — DISCHARGE NOTE NURSING/CASE MANAGEMENT/SOCIAL WORK - NSDCVIVACCINE_GEN_ALL_CORE_FT
COVID-19, mRNA, LNP-S, PF, 100 mcg/ 0.5 mL dose (Moderna); 06-Dec-2021 17:12; Afshan eLw (RADHA); [employer] [id number] (Exp. Date: 22-Dec-2021); IntraMuscular; Deltoid Left.; 0.25 milliLiter(s);

## 2023-06-09 NOTE — DISCHARGE NOTE NURSING/CASE MANAGEMENT/SOCIAL WORK - NSSCNAMETXT_GEN_ALL_CORE
[facility] AT HOME [facility] AT [place]    [phone]   RN/PT      [facility] INFUSION ([facility])  [phone]    IV ANTIBIOTICS

## 2023-06-09 NOTE — DISCHARGE NOTE NURSING/CASE MANAGEMENT/SOCIAL WORK - PATIENT PORTAL LINK FT
You can access the FollowMyHealth Patient Portal offered by [facility] by registering at the following website: [email or web address]. By joining [employer]’s FollowMyHealth portal, you will also be able to view your health information using other applications (apps) compatible with our system.

## 2023-06-10 LAB
CULTURE RESULTS: SIGNIFICANT CHANGE UP
GLUCOSE BLDC GLUCOMTR-MCNC: 127 MG/DL — HIGH (ref 70–99)
GLUCOSE BLDC GLUCOMTR-MCNC: 188 MG/DL — HIGH (ref 70–99)
GLUCOSE BLDC GLUCOMTR-MCNC: 247 MG/DL — HIGH (ref 70–99)
GLUCOSE BLDC GLUCOMTR-MCNC: 305 MG/DL — HIGH (ref 70–99)
GLUCOSE BLDC GLUCOMTR-MCNC: 64 MG/DL — LOW (ref 70–99)
SPECIMEN SOURCE: SIGNIFICANT CHANGE UP

## 2023-06-10 PROCEDURE — 99233 SBSQ HOSP IP/OBS HIGH 50: CPT

## 2023-06-10 RX ORDER — DIPHENHYDRAMINE HCL 50 MG
50 CAPSULE ORAL ONCE
Refills: 0 | Status: COMPLETED | OUTPATIENT
Start: 2023-06-10 | End: 2023-06-10

## 2023-06-10 RX ORDER — AZTREONAM 2 G
1000 VIAL (EA) INJECTION EVERY 8 HOURS
Refills: 0 | Status: DISCONTINUED | OUTPATIENT
Start: 2023-06-10 | End: 2023-06-11

## 2023-06-10 RX ADMIN — MEXILETINE HYDROCHLORIDE 200 MILLIGRAM(S): 150 CAPSULE ORAL at 05:49

## 2023-06-10 RX ADMIN — APIXABAN 5 MILLIGRAM(S): 2.5 TABLET, FILM COATED ORAL at 21:35

## 2023-06-10 RX ADMIN — MEXILETINE HYDROCHLORIDE 200 MILLIGRAM(S): 150 CAPSULE ORAL at 14:44

## 2023-06-10 RX ADMIN — Medication 600 MILLIGRAM(S): at 10:41

## 2023-06-10 RX ADMIN — Medication 20 MILLIGRAM(S): at 10:40

## 2023-06-10 RX ADMIN — INSULIN GLARGINE 50 UNIT(S): 100 INJECTION, SOLUTION SUBCUTANEOUS at 21:33

## 2023-06-10 RX ADMIN — Medication 50 MILLIGRAM(S): at 21:32

## 2023-06-10 RX ADMIN — Medication 3 MILLILITER(S): at 01:48

## 2023-06-10 RX ADMIN — Medication 3 MILLILITER(S): at 20:13

## 2023-06-10 RX ADMIN — MONTELUKAST 10 MILLIGRAM(S): 4 TABLET, CHEWABLE ORAL at 21:35

## 2023-06-10 RX ADMIN — Medication 0.5 MILLIGRAM(S): at 08:28

## 2023-06-10 RX ADMIN — PANTOPRAZOLE SODIUM 40 MILLIGRAM(S): 20 TABLET, DELAYED RELEASE ORAL at 05:49

## 2023-06-10 RX ADMIN — Medication 3 MILLILITER(S): at 14:17

## 2023-06-10 RX ADMIN — Medication 600 MILLIGRAM(S): at 21:35

## 2023-06-10 RX ADMIN — Medication 50 MILLIGRAM(S): at 22:35

## 2023-06-10 RX ADMIN — POLYETHYLENE GLYCOL 3350 17 GRAM(S): 17 POWDER, FOR SOLUTION ORAL at 14:44

## 2023-06-10 RX ADMIN — Medication 5 UNIT(S): at 09:06

## 2023-06-10 RX ADMIN — Medication 5 UNIT(S): at 18:57

## 2023-06-10 RX ADMIN — Medication 1 TABLET(S): at 11:02

## 2023-06-10 RX ADMIN — Medication 8: at 21:33

## 2023-06-10 RX ADMIN — Medication 50 MILLIGRAM(S): at 01:35

## 2023-06-10 RX ADMIN — Medication 4: at 18:57

## 2023-06-10 RX ADMIN — Medication 3 MILLILITER(S): at 08:29

## 2023-06-10 RX ADMIN — Medication 4 MILLILITER(S): at 08:41

## 2023-06-10 RX ADMIN — Medication 4 MILLILITER(S): at 20:13

## 2023-06-10 RX ADMIN — Medication 50 MILLIGRAM(S): at 17:17

## 2023-06-10 RX ADMIN — Medication 20 MILLIGRAM(S): at 21:31

## 2023-06-10 RX ADMIN — MEXILETINE HYDROCHLORIDE 200 MILLIGRAM(S): 150 CAPSULE ORAL at 21:34

## 2023-06-10 RX ADMIN — ATORVASTATIN CALCIUM 20 MILLIGRAM(S): 80 TABLET, FILM COATED ORAL at 21:35

## 2023-06-10 RX ADMIN — Medication 4 MILLILITER(S): at 14:17

## 2023-06-10 RX ADMIN — Medication 0.5 MILLIGRAM(S): at 20:12

## 2023-06-10 RX ADMIN — APIXABAN 5 MILLIGRAM(S): 2.5 TABLET, FILM COATED ORAL at 10:40

## 2023-06-11 LAB
-  AMIKACIN: SIGNIFICANT CHANGE UP
-  AMOXICILLIN/CLAVULANIC ACID: SIGNIFICANT CHANGE UP
-  AMPICILLIN/SULBACTAM: SIGNIFICANT CHANGE UP
-  AMPICILLIN: SIGNIFICANT CHANGE UP
-  AZTREONAM: SIGNIFICANT CHANGE UP
-  CEFAZOLIN: SIGNIFICANT CHANGE UP
-  CEFEPIME: SIGNIFICANT CHANGE UP
-  CEFTRIAXONE: SIGNIFICANT CHANGE UP
-  CIPROFLOXACIN: SIGNIFICANT CHANGE UP
-  ERTAPENEM: SIGNIFICANT CHANGE UP
-  GENTAMICIN: SIGNIFICANT CHANGE UP
-  LEVOFLOXACIN: SIGNIFICANT CHANGE UP
-  MEROPENEM: SIGNIFICANT CHANGE UP
-  PIPERACILLIN/TAZOBACTAM: SIGNIFICANT CHANGE UP
-  TOBRAMYCIN: SIGNIFICANT CHANGE UP
-  TRIMETHOPRIM/SULFAMETHOXAZOLE: SIGNIFICANT CHANGE UP
CULTURE RESULTS: SIGNIFICANT CHANGE UP
GLUCOSE BLDC GLUCOMTR-MCNC: 128 MG/DL — HIGH (ref 70–99)
GLUCOSE BLDC GLUCOMTR-MCNC: 186 MG/DL — HIGH (ref 70–99)
GLUCOSE BLDC GLUCOMTR-MCNC: 195 MG/DL — HIGH (ref 70–99)
GLUCOSE BLDC GLUCOMTR-MCNC: 218 MG/DL — HIGH (ref 70–99)
GLUCOSE BLDC GLUCOMTR-MCNC: 58 MG/DL — LOW (ref 70–99)
METHOD TYPE: SIGNIFICANT CHANGE UP
ORGANISM # SPEC MICROSCOPIC CNT: SIGNIFICANT CHANGE UP
ORGANISM # SPEC MICROSCOPIC CNT: SIGNIFICANT CHANGE UP
SPECIMEN SOURCE: SIGNIFICANT CHANGE UP

## 2023-06-11 PROCEDURE — 99233 SBSQ HOSP IP/OBS HIGH 50: CPT

## 2023-06-11 RX ADMIN — Medication 50 MILLIGRAM(S): at 15:27

## 2023-06-11 RX ADMIN — Medication 600 MILLIGRAM(S): at 22:51

## 2023-06-11 RX ADMIN — ATORVASTATIN CALCIUM 20 MILLIGRAM(S): 80 TABLET, FILM COATED ORAL at 22:51

## 2023-06-11 RX ADMIN — Medication 20 MILLIGRAM(S): at 09:55

## 2023-06-11 RX ADMIN — INSULIN GLARGINE 50 UNIT(S): 100 INJECTION, SOLUTION SUBCUTANEOUS at 22:50

## 2023-06-11 RX ADMIN — MEXILETINE HYDROCHLORIDE 200 MILLIGRAM(S): 150 CAPSULE ORAL at 05:50

## 2023-06-11 RX ADMIN — Medication 600 MILLIGRAM(S): at 09:55

## 2023-06-11 RX ADMIN — Medication 5 UNIT(S): at 18:43

## 2023-06-11 RX ADMIN — Medication 1 TABLET(S): at 09:57

## 2023-06-11 RX ADMIN — Medication 0.5 MILLIGRAM(S): at 20:27

## 2023-06-11 RX ADMIN — Medication 2: at 13:56

## 2023-06-11 RX ADMIN — Medication 4 MILLILITER(S): at 20:27

## 2023-06-11 RX ADMIN — APIXABAN 5 MILLIGRAM(S): 2.5 TABLET, FILM COATED ORAL at 09:55

## 2023-06-11 RX ADMIN — Medication 0.5 MILLIGRAM(S): at 07:53

## 2023-06-11 RX ADMIN — Medication 3 MILLILITER(S): at 20:27

## 2023-06-11 RX ADMIN — Medication 20 MILLIGRAM(S): at 22:52

## 2023-06-11 RX ADMIN — APIXABAN 5 MILLIGRAM(S): 2.5 TABLET, FILM COATED ORAL at 22:51

## 2023-06-11 RX ADMIN — Medication 4 MILLILITER(S): at 14:08

## 2023-06-11 RX ADMIN — Medication 3 MILLILITER(S): at 01:32

## 2023-06-11 RX ADMIN — Medication 50 MILLIGRAM(S): at 05:50

## 2023-06-11 RX ADMIN — POLYETHYLENE GLYCOL 3350 17 GRAM(S): 17 POWDER, FOR SOLUTION ORAL at 09:57

## 2023-06-11 RX ADMIN — Medication 5 UNIT(S): at 13:57

## 2023-06-11 RX ADMIN — PANTOPRAZOLE SODIUM 40 MILLIGRAM(S): 20 TABLET, DELAYED RELEASE ORAL at 06:01

## 2023-06-11 RX ADMIN — MEXILETINE HYDROCHLORIDE 200 MILLIGRAM(S): 150 CAPSULE ORAL at 15:32

## 2023-06-11 RX ADMIN — MONTELUKAST 10 MILLIGRAM(S): 4 TABLET, CHEWABLE ORAL at 22:51

## 2023-06-11 RX ADMIN — MEXILETINE HYDROCHLORIDE 200 MILLIGRAM(S): 150 CAPSULE ORAL at 22:51

## 2023-06-11 RX ADMIN — Medication 4 MILLILITER(S): at 07:54

## 2023-06-11 RX ADMIN — Medication 4: at 18:44

## 2023-06-11 RX ADMIN — Medication 3 MILLILITER(S): at 07:53

## 2023-06-11 RX ADMIN — Medication 3 MILLILITER(S): at 14:09

## 2023-06-12 LAB
ANION GAP SERPL CALC-SCNC: 5 MMOL/L — SIGNIFICANT CHANGE UP (ref 5–17)
BUN SERPL-MCNC: 25 MG/DL — HIGH (ref 7–23)
CALCIUM SERPL-MCNC: 8.9 MG/DL — SIGNIFICANT CHANGE UP (ref 8.5–10.1)
CHLORIDE SERPL-SCNC: 108 MMOL/L — SIGNIFICANT CHANGE UP (ref 96–108)
CO2 SERPL-SCNC: 28 MMOL/L — SIGNIFICANT CHANGE UP (ref 22–31)
CREAT SERPL-MCNC: 0.7 MG/DL — SIGNIFICANT CHANGE UP (ref 0.5–1.3)
EGFR: 91 ML/MIN/1.73M2 — SIGNIFICANT CHANGE UP
GLUCOSE BLDC GLUCOMTR-MCNC: 140 MG/DL — HIGH (ref 70–99)
GLUCOSE BLDC GLUCOMTR-MCNC: 167 MG/DL — HIGH (ref 70–99)
GLUCOSE BLDC GLUCOMTR-MCNC: 241 MG/DL — HIGH (ref 70–99)
GLUCOSE BLDC GLUCOMTR-MCNC: 262 MG/DL — HIGH (ref 70–99)
GLUCOSE SERPL-MCNC: 189 MG/DL — HIGH (ref 70–99)
HCT VFR BLD CALC: 37.5 % — SIGNIFICANT CHANGE UP (ref 34.5–45)
HGB BLD-MCNC: 11.2 G/DL — LOW (ref 11.5–15.5)
MCHC RBC-ENTMCNC: 22.4 PG — LOW (ref 27–34)
MCHC RBC-ENTMCNC: 29.9 GM/DL — LOW (ref 32–36)
MCV RBC AUTO: 75 FL — LOW (ref 80–100)
NRBC # BLD: 2 /100 WBCS — HIGH (ref 0–0)
PLATELET # BLD AUTO: 192 K/UL — SIGNIFICANT CHANGE UP (ref 150–400)
POTASSIUM SERPL-MCNC: 4.4 MMOL/L — SIGNIFICANT CHANGE UP (ref 3.5–5.3)
POTASSIUM SERPL-SCNC: 4.4 MMOL/L — SIGNIFICANT CHANGE UP (ref 3.5–5.3)
RBC # BLD: 5 M/UL — SIGNIFICANT CHANGE UP (ref 3.8–5.2)
RBC # FLD: 24.2 % — HIGH (ref 10.3–14.5)
SODIUM SERPL-SCNC: 141 MMOL/L — SIGNIFICANT CHANGE UP (ref 135–145)
WBC # BLD: 15.01 K/UL — HIGH (ref 3.8–10.5)
WBC # FLD AUTO: 15.01 K/UL — HIGH (ref 3.8–10.5)

## 2023-06-12 PROCEDURE — 99232 SBSQ HOSP IP/OBS MODERATE 35: CPT

## 2023-06-12 PROCEDURE — 99233 SBSQ HOSP IP/OBS HIGH 50: CPT

## 2023-06-12 PROCEDURE — 71045 X-RAY EXAM CHEST 1 VIEW: CPT | Mod: 26

## 2023-06-12 RX ORDER — ERTAPENEM SODIUM 1 G/1
1000 INJECTION, POWDER, LYOPHILIZED, FOR SOLUTION INTRAMUSCULAR; INTRAVENOUS EVERY 24 HOURS
Refills: 0 | Status: COMPLETED | OUTPATIENT
Start: 2023-06-12 | End: 2023-06-16

## 2023-06-12 RX ORDER — DIPHENHYDRAMINE HCL 50 MG
50 CAPSULE ORAL EVERY 6 HOURS
Refills: 0 | Status: DISCONTINUED | OUTPATIENT
Start: 2023-06-12 | End: 2023-06-16

## 2023-06-12 RX ORDER — NYSTATIN 500MM UNIT
500000 POWDER (EA) MISCELLANEOUS
Refills: 0 | Status: DISCONTINUED | OUTPATIENT
Start: 2023-06-12 | End: 2023-06-16

## 2023-06-12 RX ADMIN — Medication 20 MILLIGRAM(S): at 09:39

## 2023-06-12 RX ADMIN — Medication 3 MILLILITER(S): at 20:24

## 2023-06-12 RX ADMIN — Medication 0.5 MILLIGRAM(S): at 08:25

## 2023-06-12 RX ADMIN — MONTELUKAST 10 MILLIGRAM(S): 4 TABLET, CHEWABLE ORAL at 22:03

## 2023-06-12 RX ADMIN — ERTAPENEM SODIUM 120 MILLIGRAM(S): 1 INJECTION, POWDER, LYOPHILIZED, FOR SOLUTION INTRAMUSCULAR; INTRAVENOUS at 18:25

## 2023-06-12 RX ADMIN — MEXILETINE HYDROCHLORIDE 200 MILLIGRAM(S): 150 CAPSULE ORAL at 22:01

## 2023-06-12 RX ADMIN — Medication 0.5 MILLIGRAM(S): at 20:25

## 2023-06-12 RX ADMIN — Medication 600 MILLIGRAM(S): at 09:39

## 2023-06-12 RX ADMIN — Medication 4 MILLILITER(S): at 20:24

## 2023-06-12 RX ADMIN — ATORVASTATIN CALCIUM 20 MILLIGRAM(S): 80 TABLET, FILM COATED ORAL at 22:03

## 2023-06-12 RX ADMIN — APIXABAN 5 MILLIGRAM(S): 2.5 TABLET, FILM COATED ORAL at 22:03

## 2023-06-12 RX ADMIN — Medication 3 MILLILITER(S): at 14:46

## 2023-06-12 RX ADMIN — Medication 5 UNIT(S): at 09:42

## 2023-06-12 RX ADMIN — MEXILETINE HYDROCHLORIDE 200 MILLIGRAM(S): 150 CAPSULE ORAL at 14:36

## 2023-06-12 RX ADMIN — Medication 4 MILLILITER(S): at 08:27

## 2023-06-12 RX ADMIN — Medication 6: at 18:53

## 2023-06-12 RX ADMIN — Medication 3 MILLILITER(S): at 01:19

## 2023-06-12 RX ADMIN — Medication 600 MILLIGRAM(S): at 22:03

## 2023-06-12 RX ADMIN — PANTOPRAZOLE SODIUM 40 MILLIGRAM(S): 20 TABLET, DELAYED RELEASE ORAL at 06:39

## 2023-06-12 RX ADMIN — Medication 3 MILLILITER(S): at 08:24

## 2023-06-12 RX ADMIN — Medication 500000 UNIT(S): at 18:25

## 2023-06-12 RX ADMIN — Medication 2: at 14:36

## 2023-06-12 RX ADMIN — Medication 20 MILLIGRAM(S): at 22:04

## 2023-06-12 RX ADMIN — Medication 5 UNIT(S): at 14:36

## 2023-06-12 RX ADMIN — APIXABAN 5 MILLIGRAM(S): 2.5 TABLET, FILM COATED ORAL at 09:39

## 2023-06-12 RX ADMIN — INSULIN GLARGINE 50 UNIT(S): 100 INJECTION, SOLUTION SUBCUTANEOUS at 22:02

## 2023-06-12 RX ADMIN — Medication 5 UNIT(S): at 18:52

## 2023-06-12 RX ADMIN — Medication 1 TABLET(S): at 09:42

## 2023-06-12 RX ADMIN — MEXILETINE HYDROCHLORIDE 200 MILLIGRAM(S): 150 CAPSULE ORAL at 06:39

## 2023-06-12 RX ADMIN — Medication 50 MILLIGRAM(S): at 18:04

## 2023-06-12 NOTE — PROVIDER CONTACT NOTE (CRITICAL VALUE NOTIFICATION) - SITUATION
Sputum culture from 6/9 positive for numerous proteus mirabilis ESBL. PA Jim aware.
+ ESBL in urine and P.meribellus

## 2023-06-12 NOTE — PROVIDER CONTACT NOTE (CRITICAL VALUE NOTIFICATION) - TEST AND RESULT REPORTED:
Lactate: 4.4
glucose 503  lactate 5.6
Sputum Culture, numerous proteus mirabilis ESBL
+ ESBL in urine and P.meribellus

## 2023-06-13 LAB
GLUCOSE BLDC GLUCOMTR-MCNC: 196 MG/DL — HIGH (ref 70–99)
GLUCOSE BLDC GLUCOMTR-MCNC: 198 MG/DL — HIGH (ref 70–99)
GLUCOSE BLDC GLUCOMTR-MCNC: 200 MG/DL — HIGH (ref 70–99)
GLUCOSE BLDC GLUCOMTR-MCNC: 312 MG/DL — HIGH (ref 70–99)

## 2023-06-13 PROCEDURE — 99232 SBSQ HOSP IP/OBS MODERATE 35: CPT

## 2023-06-13 PROCEDURE — 99233 SBSQ HOSP IP/OBS HIGH 50: CPT

## 2023-06-13 RX ADMIN — Medication 1 TABLET(S): at 09:52

## 2023-06-13 RX ADMIN — Medication 2: at 13:24

## 2023-06-13 RX ADMIN — Medication 5 UNIT(S): at 17:38

## 2023-06-13 RX ADMIN — Medication 2: at 17:38

## 2023-06-13 RX ADMIN — Medication 2: at 09:48

## 2023-06-13 RX ADMIN — Medication 0.5 MILLIGRAM(S): at 20:28

## 2023-06-13 RX ADMIN — Medication 600 MILLIGRAM(S): at 22:04

## 2023-06-13 RX ADMIN — MEXILETINE HYDROCHLORIDE 200 MILLIGRAM(S): 150 CAPSULE ORAL at 05:23

## 2023-06-13 RX ADMIN — Medication 500000 UNIT(S): at 05:23

## 2023-06-13 RX ADMIN — Medication 600 MILLIGRAM(S): at 09:49

## 2023-06-13 RX ADMIN — Medication 0.5 MILLIGRAM(S): at 08:38

## 2023-06-13 RX ADMIN — Medication 3 MILLILITER(S): at 20:28

## 2023-06-13 RX ADMIN — Medication 20 MILLIGRAM(S): at 22:02

## 2023-06-13 RX ADMIN — Medication 500000 UNIT(S): at 12:21

## 2023-06-13 RX ADMIN — Medication 3 MILLILITER(S): at 01:23

## 2023-06-13 RX ADMIN — Medication 3 MILLILITER(S): at 14:21

## 2023-06-13 RX ADMIN — MEXILETINE HYDROCHLORIDE 200 MILLIGRAM(S): 150 CAPSULE ORAL at 13:24

## 2023-06-13 RX ADMIN — ATORVASTATIN CALCIUM 20 MILLIGRAM(S): 80 TABLET, FILM COATED ORAL at 22:04

## 2023-06-13 RX ADMIN — INSULIN GLARGINE 50 UNIT(S): 100 INJECTION, SOLUTION SUBCUTANEOUS at 21:59

## 2023-06-13 RX ADMIN — Medication 4 MILLILITER(S): at 14:26

## 2023-06-13 RX ADMIN — Medication 3 MILLILITER(S): at 08:39

## 2023-06-13 RX ADMIN — Medication 50 MILLIGRAM(S): at 17:34

## 2023-06-13 RX ADMIN — Medication 500000 UNIT(S): at 23:34

## 2023-06-13 RX ADMIN — Medication 8: at 22:02

## 2023-06-13 RX ADMIN — Medication 5 UNIT(S): at 13:23

## 2023-06-13 RX ADMIN — Medication 4 MILLILITER(S): at 20:29

## 2023-06-13 RX ADMIN — Medication 500000 UNIT(S): at 18:16

## 2023-06-13 RX ADMIN — Medication 500000 UNIT(S): at 00:48

## 2023-06-13 RX ADMIN — PANTOPRAZOLE SODIUM 40 MILLIGRAM(S): 20 TABLET, DELAYED RELEASE ORAL at 05:23

## 2023-06-13 RX ADMIN — ERTAPENEM SODIUM 120 MILLIGRAM(S): 1 INJECTION, POWDER, LYOPHILIZED, FOR SOLUTION INTRAMUSCULAR; INTRAVENOUS at 18:15

## 2023-06-13 RX ADMIN — Medication 20 MILLIGRAM(S): at 09:50

## 2023-06-13 RX ADMIN — Medication 5 UNIT(S): at 09:47

## 2023-06-13 RX ADMIN — MONTELUKAST 10 MILLIGRAM(S): 4 TABLET, CHEWABLE ORAL at 22:04

## 2023-06-13 RX ADMIN — Medication 4 MILLILITER(S): at 08:39

## 2023-06-13 RX ADMIN — APIXABAN 5 MILLIGRAM(S): 2.5 TABLET, FILM COATED ORAL at 22:04

## 2023-06-13 RX ADMIN — APIXABAN 5 MILLIGRAM(S): 2.5 TABLET, FILM COATED ORAL at 09:49

## 2023-06-13 RX ADMIN — MEXILETINE HYDROCHLORIDE 200 MILLIGRAM(S): 150 CAPSULE ORAL at 22:03

## 2023-06-14 LAB
GLUCOSE BLDC GLUCOMTR-MCNC: 196 MG/DL — HIGH (ref 70–99)
GLUCOSE BLDC GLUCOMTR-MCNC: 269 MG/DL — HIGH (ref 70–99)
GLUCOSE BLDC GLUCOMTR-MCNC: 284 MG/DL — HIGH (ref 70–99)
GLUCOSE BLDC GLUCOMTR-MCNC: 341 MG/DL — HIGH (ref 70–99)

## 2023-06-14 PROCEDURE — 99232 SBSQ HOSP IP/OBS MODERATE 35: CPT

## 2023-06-14 RX ORDER — FLUCONAZOLE 150 MG/1
150 TABLET ORAL ONCE
Refills: 0 | Status: COMPLETED | OUTPATIENT
Start: 2023-06-14 | End: 2023-06-14

## 2023-06-14 RX ADMIN — ATORVASTATIN CALCIUM 20 MILLIGRAM(S): 80 TABLET, FILM COATED ORAL at 22:39

## 2023-06-14 RX ADMIN — Medication 500000 UNIT(S): at 05:26

## 2023-06-14 RX ADMIN — Medication 5 UNIT(S): at 18:26

## 2023-06-14 RX ADMIN — Medication 3 MILLILITER(S): at 20:19

## 2023-06-14 RX ADMIN — ERTAPENEM SODIUM 120 MILLIGRAM(S): 1 INJECTION, POWDER, LYOPHILIZED, FOR SOLUTION INTRAMUSCULAR; INTRAVENOUS at 17:50

## 2023-06-14 RX ADMIN — FLUCONAZOLE 150 MILLIGRAM(S): 150 TABLET ORAL at 13:57

## 2023-06-14 RX ADMIN — MEXILETINE HYDROCHLORIDE 200 MILLIGRAM(S): 150 CAPSULE ORAL at 22:42

## 2023-06-14 RX ADMIN — Medication 4 MILLILITER(S): at 08:07

## 2023-06-14 RX ADMIN — Medication 0.5 MILLIGRAM(S): at 20:19

## 2023-06-14 RX ADMIN — Medication 4 MILLILITER(S): at 14:03

## 2023-06-14 RX ADMIN — Medication 3 MILLILITER(S): at 01:18

## 2023-06-14 RX ADMIN — Medication 5 UNIT(S): at 13:44

## 2023-06-14 RX ADMIN — Medication 6: at 09:59

## 2023-06-14 RX ADMIN — Medication 600 MILLIGRAM(S): at 10:01

## 2023-06-14 RX ADMIN — Medication 0.5 MILLIGRAM(S): at 08:01

## 2023-06-14 RX ADMIN — Medication 20 MILLIGRAM(S): at 10:01

## 2023-06-14 RX ADMIN — APIXABAN 5 MILLIGRAM(S): 2.5 TABLET, FILM COATED ORAL at 10:01

## 2023-06-14 RX ADMIN — INSULIN GLARGINE 50 UNIT(S): 100 INJECTION, SOLUTION SUBCUTANEOUS at 22:41

## 2023-06-14 RX ADMIN — Medication 3 MILLILITER(S): at 08:01

## 2023-06-14 RX ADMIN — PANTOPRAZOLE SODIUM 40 MILLIGRAM(S): 20 TABLET, DELAYED RELEASE ORAL at 05:26

## 2023-06-14 RX ADMIN — Medication 8: at 18:27

## 2023-06-14 RX ADMIN — Medication 5 UNIT(S): at 09:59

## 2023-06-14 RX ADMIN — MEXILETINE HYDROCHLORIDE 200 MILLIGRAM(S): 150 CAPSULE ORAL at 13:57

## 2023-06-14 RX ADMIN — Medication 1 TABLET(S): at 10:01

## 2023-06-14 RX ADMIN — MEXILETINE HYDROCHLORIDE 200 MILLIGRAM(S): 150 CAPSULE ORAL at 05:26

## 2023-06-14 RX ADMIN — Medication 20 MILLIGRAM(S): at 22:47

## 2023-06-14 RX ADMIN — Medication 6: at 13:45

## 2023-06-14 RX ADMIN — MONTELUKAST 10 MILLIGRAM(S): 4 TABLET, CHEWABLE ORAL at 22:50

## 2023-06-14 RX ADMIN — Medication 3 MILLILITER(S): at 14:03

## 2023-06-14 RX ADMIN — APIXABAN 5 MILLIGRAM(S): 2.5 TABLET, FILM COATED ORAL at 22:39

## 2023-06-14 RX ADMIN — Medication 600 MILLIGRAM(S): at 22:40

## 2023-06-14 RX ADMIN — Medication 4 MILLILITER(S): at 20:19

## 2023-06-14 RX ADMIN — Medication 500000 UNIT(S): at 17:54

## 2023-06-14 RX ADMIN — Medication 500000 UNIT(S): at 12:11

## 2023-06-14 RX ADMIN — Medication 50 MILLIGRAM(S): at 16:59

## 2023-06-15 LAB
GLUCOSE BLDC GLUCOMTR-MCNC: 230 MG/DL — HIGH (ref 70–99)
GLUCOSE BLDC GLUCOMTR-MCNC: 267 MG/DL — HIGH (ref 70–99)
GLUCOSE BLDC GLUCOMTR-MCNC: 323 MG/DL — HIGH (ref 70–99)
GLUCOSE BLDC GLUCOMTR-MCNC: 371 MG/DL — HIGH (ref 70–99)
HCT VFR BLD CALC: 41.8 % — SIGNIFICANT CHANGE UP (ref 34.5–45)
HGB BLD-MCNC: 12.3 G/DL — SIGNIFICANT CHANGE UP (ref 11.5–15.5)
MCHC RBC-ENTMCNC: 22.7 PG — LOW (ref 27–34)
MCHC RBC-ENTMCNC: 29.4 GM/DL — LOW (ref 32–36)
MCV RBC AUTO: 77.1 FL — LOW (ref 80–100)
NRBC # BLD: 2 /100 WBCS — HIGH (ref 0–0)
PLATELET # BLD AUTO: 239 K/UL — SIGNIFICANT CHANGE UP (ref 150–400)
RBC # BLD: 5.42 M/UL — HIGH (ref 3.8–5.2)
RBC # FLD: 25.5 % — HIGH (ref 10.3–14.5)
WBC # BLD: 21.51 K/UL — HIGH (ref 3.8–10.5)
WBC # FLD AUTO: 21.51 K/UL — HIGH (ref 3.8–10.5)

## 2023-06-15 PROCEDURE — 99233 SBSQ HOSP IP/OBS HIGH 50: CPT

## 2023-06-15 PROCEDURE — 99232 SBSQ HOSP IP/OBS MODERATE 35: CPT

## 2023-06-15 RX ADMIN — Medication 600 MILLIGRAM(S): at 21:46

## 2023-06-15 RX ADMIN — INSULIN GLARGINE 50 UNIT(S): 100 INJECTION, SOLUTION SUBCUTANEOUS at 21:44

## 2023-06-15 RX ADMIN — PANTOPRAZOLE SODIUM 40 MILLIGRAM(S): 20 TABLET, DELAYED RELEASE ORAL at 05:47

## 2023-06-15 RX ADMIN — Medication 0.5 MILLIGRAM(S): at 20:04

## 2023-06-15 RX ADMIN — Medication 500000 UNIT(S): at 13:57

## 2023-06-15 RX ADMIN — Medication 4 MILLILITER(S): at 20:04

## 2023-06-15 RX ADMIN — Medication 5 UNIT(S): at 09:20

## 2023-06-15 RX ADMIN — Medication 0.5 MILLIGRAM(S): at 08:46

## 2023-06-15 RX ADMIN — MEXILETINE HYDROCHLORIDE 200 MILLIGRAM(S): 150 CAPSULE ORAL at 21:51

## 2023-06-15 RX ADMIN — Medication 20 MILLIGRAM(S): at 21:49

## 2023-06-15 RX ADMIN — MEXILETINE HYDROCHLORIDE 200 MILLIGRAM(S): 150 CAPSULE ORAL at 14:01

## 2023-06-15 RX ADMIN — Medication 5 UNIT(S): at 13:58

## 2023-06-15 RX ADMIN — Medication 6: at 09:19

## 2023-06-15 RX ADMIN — ERTAPENEM SODIUM 120 MILLIGRAM(S): 1 INJECTION, POWDER, LYOPHILIZED, FOR SOLUTION INTRAMUSCULAR; INTRAVENOUS at 18:41

## 2023-06-15 RX ADMIN — MONTELUKAST 10 MILLIGRAM(S): 4 TABLET, CHEWABLE ORAL at 21:46

## 2023-06-15 RX ADMIN — Medication 1 TABLET(S): at 09:22

## 2023-06-15 RX ADMIN — Medication 500000 UNIT(S): at 00:02

## 2023-06-15 RX ADMIN — Medication 600 MILLIGRAM(S): at 09:18

## 2023-06-15 RX ADMIN — Medication 3 MILLILITER(S): at 14:48

## 2023-06-15 RX ADMIN — Medication 8: at 21:44

## 2023-06-15 RX ADMIN — APIXABAN 5 MILLIGRAM(S): 2.5 TABLET, FILM COATED ORAL at 09:18

## 2023-06-15 RX ADMIN — Medication 500000 UNIT(S): at 05:44

## 2023-06-15 RX ADMIN — Medication 4 MILLILITER(S): at 08:46

## 2023-06-15 RX ADMIN — Medication 5 UNIT(S): at 18:39

## 2023-06-15 RX ADMIN — Medication 500000 UNIT(S): at 18:42

## 2023-06-15 RX ADMIN — Medication 3 MILLILITER(S): at 01:00

## 2023-06-15 RX ADMIN — Medication 20 MILLIGRAM(S): at 09:21

## 2023-06-15 RX ADMIN — MEXILETINE HYDROCHLORIDE 200 MILLIGRAM(S): 150 CAPSULE ORAL at 05:44

## 2023-06-15 RX ADMIN — Medication 3 MILLILITER(S): at 20:03

## 2023-06-15 RX ADMIN — Medication 4 MILLILITER(S): at 14:48

## 2023-06-15 RX ADMIN — Medication 50 MILLIGRAM(S): at 17:22

## 2023-06-15 RX ADMIN — Medication 3 MILLILITER(S): at 08:46

## 2023-06-15 RX ADMIN — Medication 10: at 18:40

## 2023-06-15 RX ADMIN — Medication 4: at 13:59

## 2023-06-15 RX ADMIN — APIXABAN 5 MILLIGRAM(S): 2.5 TABLET, FILM COATED ORAL at 21:49

## 2023-06-15 RX ADMIN — ATORVASTATIN CALCIUM 20 MILLIGRAM(S): 80 TABLET, FILM COATED ORAL at 21:49

## 2023-06-15 NOTE — PROGRESS NOTE ADULT - ASSESSMENT
71 yo PMHx  asthma, diabetes, paroxysmal A-fib on Eliquis colorectal cancer many years ago status post colostomy presenting with concern for shortness of breath. States that she was recently discharged a few weeks ago from this hospital for parainfluenza. Since being home she had done well and then 2 days ago she began to feel chest tightness with shortness of breath again. States she has not had her Mucomyst which she feels may be contributing to her symptoms. Has been having productive cough similar to previous episodes. Still taking steroids In OBS O/N Increasing lactic acid, serum glucose and decreasing serum bicarbonate prompting ICU consult. Imaging shows interval improvement of infectious/inflammatory bronchiolitis/bronchitis. Mild residual clusters of tree in bud type nodularity are seen at the right middle and right lower lobes inferiorly. Was given merrem developed flushing on face/rash/itchiness, concern for allergy raised, abx discontinued.     1. Dyspnea. Recurrent pneumonia with ESBL Proteus Mirabilis. Bronchiolitis. Asthma exacerbation. PCN allergy.   - imaging reviewed  - completed 5 day course of vancomycin/aztreonam prior 6/5-6/9  - sputum cx grew ESBL Proteus mirabilis  - started on invanz 1gm daily #4  - plan for midline and IV invanz 1gm daily 10 more days for 14 day course until 6/25  - on steroids  - on pcp prophylaxis as on chronic steroids   - fu cultures - blood cx no growth   - monitor temps  - tolerating abx well so far; no side effects noted  - reason for abx use and side effects reviewed with patient  - pulmonary f/u noted   - supportive care  - fu cbc    2. other issues - care per medicine

## 2023-06-15 NOTE — PROGRESS NOTE ADULT - NUTRITIONAL ASSESSMENT
This patient has been assessed with a concern for Malnutrition and has been determined to have a diagnosis/diagnoses of Severe protein-calorie malnutrition.    This patient is being managed with:   Diet Consistent Carbohydrate w/Evening Snack-  Supplement Feeding Modality:  Oral  Glucerna Shake Cans or Servings Per Day:  1       Frequency:  Three Times a day  Entered: Jun 6 2023  9:50AM  

## 2023-06-16 ENCOUNTER — TRANSCRIPTION ENCOUNTER (OUTPATIENT)
Age: 73
End: 2023-06-16

## 2023-06-16 ENCOUNTER — EMERGENCY (EMERGENCY)
Facility: HOSPITAL | Age: 73
LOS: 0 days | Discharge: HOME | End: 2023-06-17
Attending: STUDENT IN AN ORGANIZED HEALTH CARE EDUCATION/TRAINING PROGRAM
Payer: MEDICARE

## 2023-06-16 VITALS
TEMPERATURE: 98 F | DIASTOLIC BLOOD PRESSURE: 40 MMHG | OXYGEN SATURATION: 95 % | HEART RATE: 91 BPM | RESPIRATION RATE: 18 BRPM | SYSTOLIC BLOOD PRESSURE: 111 MMHG

## 2023-06-16 VITALS — HEIGHT: 67 IN | WEIGHT: 139.99 LBS

## 2023-06-16 DIAGNOSIS — H05.352 EXOSTOSIS OF LEFT ORBIT: Chronic | ICD-10-CM

## 2023-06-16 DIAGNOSIS — Z86.718 PERSONAL HISTORY OF OTHER VENOUS THROMBOSIS AND EMBOLISM: ICD-10-CM

## 2023-06-16 DIAGNOSIS — Z79.01 LONG TERM (CURRENT) USE OF ANTICOAGULANTS: ICD-10-CM
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
## 2023-06-16 DIAGNOSIS — Z98.89 OTHER SPECIFIED POSTPROCEDURAL STATES: Chronic | ICD-10-CM

## 2023-06-16 DIAGNOSIS — Z92.21 PERSONAL HISTORY OF ANTINEOPLASTIC CHEMOTHERAPY: ICD-10-CM

## 2023-06-16 DIAGNOSIS — Z88.6 ALLERGY STATUS TO ANALGESIC AGENT: ICD-10-CM

## 2023-06-16 DIAGNOSIS — Z98.890 OTHER SPECIFIED POSTPROCEDURAL STATES: Chronic | ICD-10-CM

## 2023-06-16 DIAGNOSIS — Z96.653 PRESENCE OF ARTIFICIAL KNEE JOINT, BILATERAL: Chronic | ICD-10-CM

## 2023-06-16 DIAGNOSIS — Z88.7 ALLERGY STATUS TO SERUM AND VACCINE: ICD-10-CM

## 2023-06-16 DIAGNOSIS — K62.5 HEMORRHAGE OF ANUS AND RECTUM: Chronic | ICD-10-CM
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
## 2023-06-16 DIAGNOSIS — T82.838A HEMORRHAGE DUE TO VASCULAR PROSTHETIC DEVICES, IMPLANTS AND GRAFTS, INITIAL ENCOUNTER: ICD-10-CM

## 2023-06-16 DIAGNOSIS — J45.909 UNSPECIFIED ASTHMA, UNCOMPLICATED: ICD-10-CM

## 2023-06-16 DIAGNOSIS — Z87.09 PERSONAL HISTORY OF OTHER DISEASES OF THE RESPIRATORY SYSTEM: Chronic | ICD-10-CM

## 2023-06-16 DIAGNOSIS — E11.9 TYPE 2 DIABETES MELLITUS WITHOUT COMPLICATIONS: ICD-10-CM

## 2023-06-16 DIAGNOSIS — Z90.711 ACQUIRED ABSENCE OF UTERUS WITH REMAINING CERVICAL STUMP: ICD-10-CM

## 2023-06-16 DIAGNOSIS — Z88.0 ALLERGY STATUS TO PENICILLIN: ICD-10-CM

## 2023-06-16 DIAGNOSIS — Z91.013 ALLERGY TO SEAFOOD: ICD-10-CM

## 2023-06-16 DIAGNOSIS — Z88.5 ALLERGY STATUS TO NARCOTIC AGENT: ICD-10-CM

## 2023-06-16 DIAGNOSIS — Z85.038 PERSONAL HISTORY OF OTHER MALIGNANT NEOPLASM OF LARGE INTESTINE: ICD-10-CM

## 2023-06-16 DIAGNOSIS — Z79.4 LONG TERM (CURRENT) USE OF INSULIN: ICD-10-CM

## 2023-06-16 DIAGNOSIS — Z96.653 PRESENCE OF ARTIFICIAL KNEE JOINT, BILATERAL: ICD-10-CM

## 2023-06-16 DIAGNOSIS — Z88.1 ALLERGY STATUS TO OTHER ANTIBIOTIC AGENTS STATUS: ICD-10-CM

## 2023-06-16 DIAGNOSIS — Z91.041 RADIOGRAPHIC DYE ALLERGY STATUS: ICD-10-CM

## 2023-06-16 DIAGNOSIS — I48.0 PAROXYSMAL ATRIAL FIBRILLATION: ICD-10-CM

## 2023-06-16 LAB
GLUCOSE BLDC GLUCOMTR-MCNC: 107 MG/DL — HIGH (ref 70–99)
GLUCOSE BLDC GLUCOMTR-MCNC: 172 MG/DL — HIGH (ref 70–99)
GLUCOSE BLDC GLUCOMTR-MCNC: 238 MG/DL — HIGH (ref 70–99)

## 2023-06-16 PROCEDURE — 99239 HOSP IP/OBS DSCHRG MGMT >30: CPT

## 2023-06-16 PROCEDURE — 86900 BLOOD TYPING SEROLOGIC ABO: CPT

## 2023-06-16 PROCEDURE — 86850 RBC ANTIBODY SCREEN: CPT

## 2023-06-16 PROCEDURE — 99285 EMERGENCY DEPT VISIT HI MDM: CPT | Mod: 25

## 2023-06-16 PROCEDURE — 93931 UPPER EXTREMITY STUDY: CPT | Mod: RT

## 2023-06-16 PROCEDURE — 99233 SBSQ HOSP IP/OBS HIGH 50: CPT

## 2023-06-16 PROCEDURE — 96374 THER/PROPH/DIAG INJ IV PUSH: CPT | Mod: XU

## 2023-06-16 PROCEDURE — 86901 BLOOD TYPING SEROLOGIC RH(D): CPT

## 2023-06-16 PROCEDURE — 36573 INSJ PICC RS&I 5 YR+: CPT | Mod: LT

## 2023-06-16 PROCEDURE — 80053 COMPREHEN METABOLIC PANEL: CPT

## 2023-06-16 PROCEDURE — 99285 EMERGENCY DEPT VISIT HI MDM: CPT

## 2023-06-16 PROCEDURE — 83036 HEMOGLOBIN GLYCOSYLATED A1C: CPT

## 2023-06-16 PROCEDURE — 85610 PROTHROMBIN TIME: CPT

## 2023-06-16 PROCEDURE — 36589 REMOVAL TUNNELED CV CATH: CPT | Mod: RT

## 2023-06-16 PROCEDURE — 94640 AIRWAY INHALATION TREATMENT: CPT

## 2023-06-16 PROCEDURE — 36415 COLL VENOUS BLD VENIPUNCTURE: CPT

## 2023-06-16 PROCEDURE — 85027 COMPLETE CBC AUTOMATED: CPT

## 2023-06-16 PROCEDURE — 96375 TX/PRO/DX INJ NEW DRUG ADDON: CPT | Mod: XU

## 2023-06-16 PROCEDURE — C1751: CPT

## 2023-06-16 PROCEDURE — 85730 THROMBOPLASTIN TIME PARTIAL: CPT

## 2023-06-16 RX ORDER — ALBUTEROL 90 UG/1
2.5 AEROSOL, METERED ORAL
Refills: 0 | DISCHARGE

## 2023-06-16 RX ORDER — PANTOPRAZOLE SODIUM 20 MG/1
1 TABLET, DELAYED RELEASE ORAL
Refills: 0 | DISCHARGE

## 2023-06-16 RX ORDER — ERTAPENEM SODIUM 1 G/1
1 INJECTION, POWDER, LYOPHILIZED, FOR SOLUTION INTRAMUSCULAR; INTRAVENOUS
Qty: 0 | Refills: 0 | DISCHARGE
Start: 2023-06-16 | End: 2023-06-25

## 2023-06-16 RX ORDER — UMECLIDINIUM 62.5 UG/1
1 AEROSOL, POWDER ORAL
Qty: 4 | Refills: 0
Start: 2023-06-16 | End: 2023-07-15

## 2023-06-16 RX ORDER — ISOPROPYL ALCOHOL, BENZOCAINE .7; .06 ML/ML; ML/ML
0 SWAB TOPICAL
Qty: 100 | Refills: 1
Start: 2023-06-16

## 2023-06-16 RX ORDER — APIXABAN 2.5 MG/1
1 TABLET, FILM COATED ORAL
Refills: 0 | DISCHARGE

## 2023-06-16 RX ORDER — ALBUTEROL 90 UG/1
2.5 AEROSOL, METERED ORAL
Qty: 360 | Refills: 0
Start: 2023-06-16 | End: 2023-07-15

## 2023-06-16 RX ORDER — MEXILETINE HYDROCHLORIDE 150 MG/1
1 CAPSULE ORAL
Qty: 90 | Refills: 0
Start: 2023-06-16 | End: 2023-07-15

## 2023-06-16 RX ORDER — MONTELUKAST 4 MG/1
1 TABLET, CHEWABLE ORAL
Qty: 30 | Refills: 0
Start: 2023-06-16 | End: 2023-07-15

## 2023-06-16 RX ORDER — POLYETHYLENE GLYCOL 3350 17 G/17G
17 POWDER, FOR SOLUTION ORAL
Qty: 510 | Refills: 0
Start: 2023-06-16 | End: 2023-07-15

## 2023-06-16 RX ORDER — ALBUTEROL 90 UG/1
2 AEROSOL, METERED ORAL
Refills: 0 | DISCHARGE

## 2023-06-16 RX ORDER — APIXABAN 2.5 MG/1
1 TABLET, FILM COATED ORAL
Qty: 60 | Refills: 0
Start: 2023-06-16 | End: 2023-07-15

## 2023-06-16 RX ORDER — PANTOPRAZOLE SODIUM 20 MG/1
1 TABLET, DELAYED RELEASE ORAL
Qty: 30 | Refills: 0
Start: 2023-06-16 | End: 2023-07-15

## 2023-06-16 RX ORDER — DIPHENHYDRAMINE HCL 50 MG
1 CAPSULE ORAL
Qty: 20 | Refills: 0
Start: 2023-06-16 | End: 2023-06-20

## 2023-06-16 RX ORDER — FLUTICASONE FUROATE AND VILANTEROL TRIFENATATE 100; 25 UG/1; UG/1
1 POWDER RESPIRATORY (INHALATION)
Qty: 1 | Refills: 0
Start: 2023-06-16 | End: 2023-07-15

## 2023-06-16 RX ORDER — INSULIN GLARGINE 100 [IU]/ML
45 INJECTION, SOLUTION SUBCUTANEOUS
Qty: 5 | Refills: 0
Start: 2023-06-16 | End: 2023-07-15

## 2023-06-16 RX ORDER — BUDESONIDE, MICRONIZED 100 %
2 POWDER (GRAM) MISCELLANEOUS
Qty: 60 | Refills: 0
Start: 2023-06-16 | End: 2023-07-15

## 2023-06-16 RX ORDER — ONDANSETRON 8 MG/1
1 TABLET, FILM COATED ORAL
Qty: 40 | Refills: 0
Start: 2023-06-16 | End: 2023-06-25

## 2023-06-16 RX ORDER — ALBUTEROL 90 UG/1
2 AEROSOL, METERED ORAL
Qty: 1 | Refills: 0
Start: 2023-06-16 | End: 2023-07-15

## 2023-06-16 RX ORDER — INSULIN LISPRO 100/ML
5 VIAL (ML) SUBCUTANEOUS
Qty: 5 | Refills: 0
Start: 2023-06-16 | End: 2023-07-15

## 2023-06-16 RX ORDER — ROSUVASTATIN CALCIUM 5 MG/1
1 TABLET ORAL
Qty: 30 | Refills: 0
Start: 2023-06-16 | End: 2023-07-15

## 2023-06-16 RX ADMIN — Medication 50 MILLIGRAM(S): at 16:19

## 2023-06-16 RX ADMIN — Medication 500000 UNIT(S): at 13:51

## 2023-06-16 RX ADMIN — MEXILETINE HYDROCHLORIDE 200 MILLIGRAM(S): 150 CAPSULE ORAL at 13:51

## 2023-06-16 RX ADMIN — Medication 5 UNIT(S): at 13:50

## 2023-06-16 RX ADMIN — Medication 4 MILLILITER(S): at 14:27

## 2023-06-16 RX ADMIN — APIXABAN 5 MILLIGRAM(S): 2.5 TABLET, FILM COATED ORAL at 11:03

## 2023-06-16 RX ADMIN — Medication 1 TABLET(S): at 11:03

## 2023-06-16 RX ADMIN — Medication 600 MILLIGRAM(S): at 11:03

## 2023-06-16 RX ADMIN — Medication 3 MILLILITER(S): at 14:26

## 2023-06-16 RX ADMIN — Medication 0.5 MILLIGRAM(S): at 08:20

## 2023-06-16 RX ADMIN — Medication 4 MILLILITER(S): at 08:20

## 2023-06-16 RX ADMIN — Medication 3 MILLILITER(S): at 08:15

## 2023-06-16 RX ADMIN — Medication 2: at 13:51

## 2023-06-16 RX ADMIN — ERTAPENEM SODIUM 120 MILLIGRAM(S): 1 INJECTION, POWDER, LYOPHILIZED, FOR SOLUTION INTRAMUSCULAR; INTRAVENOUS at 16:53

## 2023-06-16 RX ADMIN — PANTOPRAZOLE SODIUM 40 MILLIGRAM(S): 20 TABLET, DELAYED RELEASE ORAL at 06:38

## 2023-06-16 RX ADMIN — Medication 5 UNIT(S): at 10:53

## 2023-06-16 RX ADMIN — Medication 20 MILLIGRAM(S): at 11:04

## 2023-06-16 RX ADMIN — MEXILETINE HYDROCHLORIDE 200 MILLIGRAM(S): 150 CAPSULE ORAL at 06:51

## 2023-06-16 RX ADMIN — Medication 500000 UNIT(S): at 06:38

## 2023-06-16 RX ADMIN — Medication 500000 UNIT(S): at 00:06

## 2023-06-16 RX ADMIN — Medication 3 MILLILITER(S): at 01:39

## 2023-06-16 NOTE — ED STATDOCS - OBJECTIVE STATEMENT
72 year old female with hx asthma on chronic steroids, DM, pAfib on Eliquis, colorectal ca yrs ago s/p colostomy, recent admission for parainfluenza, PNA presents to the ED c/o midline that was inserted earlier today that is bleeding on R arm. Patient was d/c today from [facility] after admission for resp failure with hypoxia due to asthma/COPD exacerbation and bronchopneumonia and noticed midline was bleeding x2 hours later. No other complaints at this time.

## 2023-06-16 NOTE — ED STATDOCS - NSFOLLOWUPINSTRUCTIONS_ED_ALL_ED_FT
Return to the Emergency Department for worsening or persistent symptoms, and/or ANY NEW OR CONCERNING SYMPTOMS. If you have issues obtaining follow up, please call: [phone] or [phone]  to obtain a doctor or specialist who takes your insurance in your area.    Midline Catheter  A midline catheter is a small, thin tube that is inserted into a vein in the upper arm or at the bend in the elbow. Its tip ends at or near the armpit (axillary) area. A midline catheter is a type of IV access.    A midline catheter may be used to:  Give IV fluids and nutrients.  Give medicines.  Draw blood.  Give blood back to the body, such as during a blood transfusion or hemodialysis.  Inject a contrast dye for a CT scan (power injection).  Provide IV access for treatment that lasts 1–4 weeks.  Tell a health care provider about:  Any allergies you have.  All medicines you are taking, including vitamins, herbs, eye drops, creams, and over-the-counter medicines.  Any problems you or family members have had with anesthetic medicines.  Any blood disorders you have.  Any surgeries you have had.  Any medical conditions you have.  Whether you are pregnant or may be pregnant.  Any history of health care providers not being able to find a vein for an IV or blood draw.  What are the risks?  Generally, insertion and use of midline catheters is safe. However, problems may occur, including:  Blood clots. A clot can form in the midline catheter or at its tip.  Phlebitis. This is when the vein becomes warm, swollen, and tender. A red streak may develop along the vein where the midline catheter is inserted.  Leaking (infiltration) of IV fluids or medicine into the tissue surrounding the vein. This can cause swelling, pain, and tissue damage in the arm.  Infection.  Nerve or tendon injury or irritation during midline catheter insertion.  What happens before the procedure?  Follow instructions from your health care provider about eating or drinking restrictions.  Ask your health care provider about changing or stopping your regular medicines. This is especially important if you are taking diabetes medicines or blood thinners.  What happens during the procedure?  Your skin at the IV site will be washed with a germ-killing (antiseptic) solution to help prevent infection.  A bandage or cord (tourniquet) will be tightly wrapped around your upper arm.  An ultrasound may be used to identify the correct vein.  You may be given a medicine to numb the area (local anesthetic). This may be applied to or injected into the skin where the IV catheter will be placed.  A sharp needle will be used to enter the vein, and the catheter will be placed into the vein.  The needle will be taken out, and the catheter will stay in place.  The tourniquet will be removed.  A syringe may be attached to the catheter to make sure that you can draw blood from it.  The catheter may be flushed with a liquid called normal saline or heparin.  A clear bandage (dressing) or tape will be applied to hold the catheter in place.  The procedure may vary among health care providers and hospitals.    What can I expect after the procedure?  The catheter may be capped for use later on, or an IV tube may be attached to give fluids, medicine, or blood.  Your health care provider will check the IV site as needed to make sure:  There is no bleeding, swelling, or pain.  Fluid is flowing through the catheter properly.  There are no signs of infection.  Follow these instructions at home:    Follow instructions from your health care provider about how to take care of your midline catheter at home. To make sure that your catheter works well:  Wash your hands with soap and water for at least 20 seconds before and after caring for or using your midline catheter. If soap and water are not available, use hand .  Before connecting a syringe or tubing to your midline catheter, scrub the tip of your catheter with a new alcohol wipe for 10–14 seconds. Allow the catheter tip to dry completely. Do this every time before you connect the syringe or tubing to your catheter.  Do not let the midline catheter dressing get wet. Change the dressing right away if it becomes wet.  Do not take baths, swim, or use a hot tub until your health care provider approves. Cover the dressing with a watertight covering when you take a bath or a shower.  Do not pull on the midline catheter or tubing. Doing this can move the catheter out of its place in the vein. If the midline catheter is pulled out of place, the IV fluids or medicine you are getting can leak into the surrounding tissue.  Do not allow blood pressure monitoring or needle punctures on the side where the midline catheter is located.  Do not lift anything that is heavier than 10 lb (4.5 kg) or the limit that you are told by your health care provider.  Check your insertion site every day for signs of infection. Check for:  Redness, swelling, or pain.  Fluid or blood.  Warmth.  Pus or a bad smell.  Contact a health care provider if:  The dressing is loose and the midline catheter insertion site is exposed.  The skin is irritated where the dressing has been applied.  Get help right away if:  You have chills or a fever.  There is bleeding at the site where the midline catheter enters your arm.  There is drainage, redness, swelling, discomfort, or warmth in the arm with the midline catheter.  You are unable to flush your midline catheter, or it feels blocked.  The midline catheter is partially or completely pulled out.  Your catheter is broken or leaking.  You are dizzy.  You have chest pain or shortness of breath.  You have an irregular heartbeat.  These symptoms may represent a serious problem that is an emergency. Do not wait to see if the symptoms will go away. Get medical help right away. Call your local emergency services (911 in the [place] Do not drive yourself to the hospital.    Summary  A midline catheter is a small, thin tube that is inserted into a vein in the upper arm or at the bend in the elbow.  A midline catheter may be used to give IV fluids or nutrients, give medicines, draw blood, give blood back to the body, inject a dye for a CT scan, or provide IV access for treatment that lasts 1–4 weeks.  Check your insertion site every day for signs of infection. Signs include redness, swelling, pain, fluid, blood, warmth, pus, or a bad smell.  This information is not intended to replace advice given to you by your health care provider. Make sure you discuss any questions you have with your health care provider.

## 2023-06-16 NOTE — DISCHARGE NOTE PROVIDER - NSDCFUSCHEDAPPT_GEN_ALL_CORE_FT
Martin Kamara  [facility]  INTMED [address]  Scheduled Appointment: 06/20/2023    [facility]  PULMMED [address]  Scheduled Appointment: 06/22/2023    Rico Glover  [facility]  CARDIOLOGY [address]  Scheduled Appointment: 07/27/2023    Genesis Aragon  [facility]  ELECTROPH [id number]  Scheduled Appointment: 07/27/2023    Eulalio Allison  [facility]  PULMMED [address]  Scheduled Appointment: 08/01/2023    [facility]  PULED [address]  Scheduled Appointment: 08/01/2023

## 2023-06-16 NOTE — PROGRESS NOTE ADULT - REASON FOR ADMISSION
Malaise, SOB

## 2023-06-16 NOTE — DISCHARGE NOTE PROVIDER - NSDCCPCAREPLAN_GEN_ALL_CORE_FT
PRINCIPAL DISCHARGE DIAGNOSIS  Diagnosis: Pneumonia  Assessment and Plan of Treatment: WHAT IS PNEUMONIA? Pneumonia (PNA) is a lung infection caused by bacteria which makes your lungs inflamed.  THINGS TO DO: (1) Rest as needed (2) Do not smoke – smoking increases your risk for pneumonia (3) Prevent the spread of germs – wash your hands often and cover your mouth when you cough (4) Ask about vaccines with your primary care provider  MONITOR THESE SIGNS AND SYMPTOMS: (1) Worsening confusion (2) Worsening/trouble breathing (3) Fever > 100.4. If you experience any of these, DO alert your primary care provider, or return to the Emergency Department if you feel very sick.   Found to have pneumonia due to resistant bacteria  Continue IV Ertapenem 1g once a day throught midline to complete 14 day course (last dose on 6/25/23)      SECONDARY DISCHARGE DIAGNOSES  Diagnosis: Acute hypoxemic respiratory failure  Assessment and Plan of Treatment: Due to asthma/COPD exacerbation, pneumonia as above. Now resolved on breathing on room air    Diagnosis: Asthma exacerbation in COPD  Assessment and Plan of Treatment: Continue inhalers, nebulizers as originally prescribed  Received IV steroids in the hospital   Continue Medrol Taper as outlined:   Take Medrol 24mg once a day x4 days, then  Take Medrol 20mg once a day x4 days, then  Take Medrol 16mg once a day x4 days, then  Take Medrol 12mg once a day x4 days, then  Take Medrol 8mg once a day x4 days, then  Take Medrol 4mg once a day until otherwise told to stop by your pulmonogist   Follow up with outpatient pulmonary Dr. Allison for further management       Diagnosis: Diabetes mellitus  Assessment and Plan of Treatment: Continue Basaglar Kwikpen 45 units at bedtime   Take your blood sugar 4 times daily; once before breakfast (this is your FASTING blood sugar = no caloric intake for 6-8 hours); and then also take blood sugar once before lunch, dinner and then before bedtime.  For every day your FASTING blood sugar is greater than 150, increase the dose of the subsequent Basaglar dose by 1 unit. For every day your FASTING blood sugar is less than 105, then decrease the subsequent dose of Basaglar by 1 Unit.   Continue Humalog Kwikpen 5 units three times a day with meals + sliding scale   Maintain a log of your blood sugar readings and the dosages of insulin administered to be reviewed by your primary medical doctor and further evaluation and titration of medication

## 2023-06-16 NOTE — ED STATDOCS - PHYSICAL EXAMINATION
Constitutional: Awake, Alert, non-toxic. No acute distress.  HEAD: Normocephalic, atraumatic.   EYES: PERRL, EOM intact, conjunctiva and sclera are clear bilaterally.  ENT: External ears normal. No rhinorrhea, no tracheal deviation   NECK: Supple, non-tender  CARDIOVASCULAR: regular rate and rhythm.  RESPIRATORY: Normal respiratory effort; breath sounds CTAB, no wheezes, rhonchi, or rales. Speaking in full sentences. No accessory muscle use.   ABDOMEN: Soft; non-tender, non-distended. No rebound or guarding.   MSK:  no lower extremity edema, no deformities  SKIN: Warm, dry +RUE midline dressing with coagulated blood  NEURO: A&O x3. Sensory and motor functions are grossly intact. Speech is normal. No facial droop.  PSYCH: Appearance and judgement seem appropriate for gender and age.

## 2023-06-16 NOTE — ED ADULT TRIAGE NOTE - MEANS OF ARRIVAL
Elissa Genao (Self) [phone]          Alternative phone number: none     Turnaround time given to caller:   \"This message will be sent to [state Provider's name]. The clinical team will fulfill your request as soon as they review your message.\"     ambulatory

## 2023-06-16 NOTE — ED STATDOCS - CLINICAL SUMMARY MEDICAL DECISION MAKING FREE TEXT BOX
Patient just d/c with midline, now with bleeding. No other complaints. IV team not currently in hospital. Will likely hold in ER overnight for eval by IV team in AM. Patient just d/c with midline, now with bleeding. No other complaints. IV team not currently in hospital. Will likely hold in ER overnight for eval by IV team in AM.  -Andres - pt given pretreatment and ertapenem through new LUE midline w/o issue, no further bleeding. Stable for DC with mild compression bandage on RUE hematoma and continued home care

## 2023-06-16 NOTE — ADVANCED PRACTICE NURSE CONSULT - ASSESSMENT
Alert and oriented, chart reviewed prior to insertion, verbal consent given, risk and benefits explained and pt agreed to the procedure, P/s she had multiple line placements through the years, "...I've had piccs and ports and all kinds of lines. I know all about them..." US assessments bilaterally reveals mostly non-compliant veins, pt in bed in semi-fowlers position,  pt wanted it on the  left side, attempted x1 for the only vein that was fairly compressable was the brachial vein, when trying to access the vein pt c/o shooting pain. needle withdrew quickly and no further c/o, rationale given for the need to go on the right side, pt then agreed, again lacking choices for access for placement, basilic and brachial compliant, attempted the larger brachial vein and again pt c/o shooting pain, withdrew and redirected to the nearby smaller basilic vein with good blood return and flushes well with 20mL/NS, catheter length 10cm SL 18g, vein access using the sonosite US, no blood loss, procedure done using sterile technique, pt tolerated it well, no CXR needed may use ASAP, all sharps disposed of per policy, report given to district RN, [id number]   Alert and oriented, chart reviewed prior to insertion, verbal consent given, risk and benefits explained and pt agreed to the procedure, P/s she had multiple line placements through the years, "...I've had piccs and ports and all kinds of lines. I know all about them..." US assessments bilaterally reveals mostly non-compliant veins, pt in bed in semi-fowlers position,  pt wanted it on the  left side, attempted x1 for the only vein that was fairly compressable was the brachial vein, when trying to access the vein pt c/o shooting pain. needle withdrew quickly and no further c/o, rationale given for the need to go on the right side, pt then agreed, again lacking choices for access for placement, basilic and brachial compliant, attempted the larger brachial vein and again pt c/o shooting pain, withdrew and redirected to the nearby smaller basilic vein with good blood return and flushes well with 20mL/NS, A Bard PowerGlide Pro catheter placed with length 10cm SL 18g, vein access using the sonosite US, no blood loss, procedure done using sterile technique, pt tolerated it well, no CXR needed may use ASAP, all sharps disposed of per policy, report given to district RN, [id number]

## 2023-06-16 NOTE — DISCHARGE NOTE PROVIDER - HOSPITAL COURSE
Physical Exam:  Vital Signs Last 24 Hrs  T(C): 36.9 (16 Jun 2023 15:23), Max: 36.9 (16 Jun 2023 15:23)  T(F): 98.5 (16 Jun 2023 15:23), Max: 98.5 (16 Jun 2023 15:23)  HR: 91 (16 Jun 2023 15:23) (76 - 91)  BP: 111/40 (16 Jun 2023 15:23) (103/54 - 135/49)  RR: 18 (16 Jun 2023 15:23) (18 - 18)  SpO2: 95% (16 Jun 2023 15:23) (95% - 100%)    Constitutional: NAD, awake and alert  HEENT: PERRLA, EOMI, MMM  Respiratory: Breath sounds are clear bilaterally, No wheezing, rales or rhonchi  Cardiovascular: S1 and S2, RRR, no murmurs, gallops or rubs  Gastrointestinal: +BS, soft, non-tender, non-distended, no CVA tenderness  Extremities: No peripheral edema, +DP pulses b/l  Neurological: A&O x 3, no focal deficits  Musculoskeletal: 5/5 strength b/l upper and lower extremities  Skin: Normal, skin warm and dry    Assessment/Plan:  72F hx asthma on chronic steroids, DM, pAfib on Eliquis, colorectal ca yrs ago s/p colostomy, recent admission for parainfluenza, PNA. P/w SOB, and elevated BG, not in DKA.    #Acute resp failure with hypoxia due to asthma/COPD exacerbation and bronchopneumonia  -now on RA   -CT chest improved  -Sputum Cx: ESBL Proteus, yeast --- day 4 of Ertapenem 1g QD and benadryl 50mg prior given hx of skin reaction -> continue until 6/25/23 per ID  -ID evaluation appreciated    -s/p 5d Vanco IV, and aztreonam   -steroids now Methylpred 20iv q12 ,  c/w prolonged medrol taper  -nebs, Smart Vest   -appreciate pulm and ID input  -F/u outpatient pulm Dr Allison    #IDDM  -Lantus 45u qHS, Humalog 5U pre-meal w/ ISS  -close outpatient f/u with PCP or endocrinology for further management     #PCP ppx prevention given chronic steroids  -Bactrim      #Afib  -Eliquis, Mexilitine    Dispo: discharge to home w/ home care in stable condition    Final diagnosis, treatment plan, and follow-up recommendations were discussed and explained to the patient. The patient was given an opportunity to ask questions concerning the diagnosis and treatment plan. The patient acknowledged understanding of the diagnosis, treatment, and follow-up recommendations. The patient was advised to seek urgent care upon discharge if worsening symptoms develop prior to scheduled follow-up. Time spent on discharge included time with the patient, and also coordinating discharge care as outlined below.    Total time spent: 50 min

## 2023-06-16 NOTE — ED STATDOCS - PROGRESS NOTE DETAILS
Consult placed for PICC line as patient does not want anyone else manipulating the midline at this time.  Home meds ordered.  Nain Morales MD. Andres - assumed care in am. Patient seen by IV team, right upper extremity midline was removed, there was a small palpable hematoma that grew larger after removal so pressure dressing was placed.  IV team was able to place line on the left arm, was technically difficult but line was drawing back and flushing.  Will trial first dose of ertapenem preceded by Benadryl here in the ED, if line functioning well patient can be discharged for continuation of treatment at home

## 2023-06-16 NOTE — ED CLERICAL - NS ED CARE COORDINATION INFORMATION
This patient is enrolled in the readmission program and has active care navigation. This patient can be followed up by the care navigation team within 24 hours. To arrange close follow-up or to obtain additional clinical information about this patient, please call the contact number above.   Please call the hospitalist as needed to collaborate on further medical management ([phone])

## 2023-06-16 NOTE — DISCHARGE NOTE PROVIDER - PROVIDER TOKENS
PROVIDER:[TOKEN:[[id number]],FOLLOWUP:[1 week],ESTABLISHEDPATIENT:[T]],PROVIDER:[TOKEN:[[id number]],FOLLOWUP:[1 week],ESTABLISHEDPATIENT:[T]]

## 2023-06-16 NOTE — ED STATDOCS - NSICDXPASTSURGICALHX_GEN_ALL_CORE_FT
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    H/O pelvic surgery 5 years ago - s/p fracture    H/O total knee replacement, bilateral 5 years ago    History of partial hysterectomy 30 years ago - fibroids    History of sinus surgery multiple sinus surgeries    History of tracheomalacia 2015 - attempted tracheal stenting ([facility])- course complicated by obstruction, respiratory failure, multiple CPR attempts -  stent discontinued; 10/20/2016 Tracheobronchoplasty (Prolene Mesh) performed at [facility] by Dr Zapien    Rectal bleeding exam under anesthesia ([facility]) 2/2018    S/P bronchoscopy 6/5/2018 - [place] [facility] (Dr Zapien) no evidence of tracheobronchomalacia in trachea or bronchial tubes

## 2023-06-16 NOTE — DISCHARGE NOTE PROVIDER - NSDCMRMEDTOKEN_GEN_ALL_CORE_FT
Albuterol (Eqv-Ventolin HFA) 90 mcg/inh inhalation aerosol: 2 puff(s) inhaled 3 times a day as needed for  shortness of breath and/or wheezing after neb  albuterol 2.5 mg/3 mL (0.083%) inhalation solution: 2.5 milligram(s) by nebulizer every 6 hours as needed for  shortness of breath and/or wheezing  alcohol swabs: Apply topically to affected area 4 times a day  apixaban 5 mg oral tablet: 1 tab(s) orally 2 times a day  Bactrim  mg-160 mg oral tablet: 1 tab(s) orally once a day  Basaglar KwikPen 100 units/mL subcutaneous solution: 45 unit(s) subcutaneous once a day (at bedtime)  Breo Ellipta 200 mcg-25 mcg/inh inhalation powder: 1 puff(s) inhaled once a day  budesonide 0.5 mg/2 mL inhalation suspension: 2 milliliter(s) inhaled 2 times a day  Crestor 5 mg oral tablet: 1 tab(s) orally once a day (at bedtime)  diphenhydrAMINE 50 mg oral capsule: 1 cap(s) orally every 6 hours as needed for Rash and/or Itching  ertapenem 1 g injection: 1 gram(s) injectable once a day Continue until 6/25/23  glucometer (per patient&#x27;s insurance): Test blood sugars four times a day. Dispense #1 glucometer.  guaiFENesin 600 mg oral tablet, extended release: 1 tab(s) orally every 12 hours  HumaLOG KwikPen 100 units/mL injectable solution: 5 unit(s) subcutaneously 3 times a day (before meals) Inject as per sliding scale: 100-150 = 5 units, 151-200 = 6 units, 201-250 = 7 units, 251-300 = 8 units, 301-350 = 9 units, 351-400 = 10 units, &gt; 400 = call md  Incruse Ellipta 62.5 mcg/inh inhalation powder: 1 puff(s) inhaled every 24 hours  Insulin Pen Needles, 4mm: 1 application subcutaneously 4 times a day. ** Use with insulin pen **  lancets: 1 application subcutaneously 4 times a day  Medrol 4 mg oral tablet: 6 tab(s) orally once a day Medrol Taper: Start with 24mg once a day x4 days, then decrease by 4mg every 4 days, then take 4mg daily until told to stop by your pulmonologist  mexiletine 200 mg oral capsule: 1 cap(s) orally every 8 hours  montelukast 10 mg oral tablet: 1 tab(s) orally once a day (at bedtime)  ondansetron 4 mg oral tablet: 1 tab(s) orally every 6 hours as needed for  nausea  pantoprazole 40 mg oral delayed release tablet: 1 tab(s) orally once a day  polyethylene glycol 3350 oral powder for reconstitution: 17 gram(s) orally once a day as needed for Constipation  test strips (per patient&#x27;s insurance): 1 application subcutaneously 4 times a day. ** Compatible with patient&#x27;s glucometer **

## 2023-06-16 NOTE — PROGRESS NOTE ADULT - PROVIDER SPECIALTY LIST ADULT
Hospitalist
Hospitalist
Pulmonology
Hospitalist
Infectious Disease
Pulmonology
Critical Care
Hospitalist
Infectious Disease
Infectious Disease
Pulmonology
Pulmonology
Hospitalist
Infectious Disease
Infectious Disease
Hospitalist
Hospitalist

## 2023-06-16 NOTE — PROGRESS NOTE ADULT - SUBJECTIVE AND OBJECTIVE BOX
72F hx/o severe persistent asthma on chronic steroids, previously in ICU,  DM, pAfib on Eliquis, colorectal ca yrs ago s/p colostomy, recent admission for parainfluenza, PNA. P/w SOB, found to have bronchopna and asthma exacerbation     Additional results/Imaging, I have personally reviewed:  CT chest noncon 6/4/23: Interval improvement of infectious/inflammatory bronchiolitis/bronchitis. Mild residual clusters of tree in bud type nodularity are seen at the right middle and right lower lobes inferiorly.    CXR 6/4/23: No acute pulmonary disease.    6.10: +deep productive coughing, gray sputum, +KR[place]ER, no wheezing   6.11: deep coughing , no wheezing, no fever/chills, +KRAMER            REVIEW OF SYSTEMS:    CONSTITUTIONAL: No weakness, No fevers or chills  ENT: No ear ache, No sorethroat  NECK: No pain, No stiffness  RESPIRATORY: ++deep cough, No wheezing, No hemoptysis; ++ dyspnea  CARDIOVASCULAR: No chest pain, No palpitations  GASTROINTESTINAL: No abd pain, No nausea, No vomiting, No hematemesis, No diarrhea or constipation. No melena, No hematochezia.  GENITOURINARY: No dysuria, No  hematuria  NEUROLOGICAL: No diplopia, No paresthesia, No motor dysfunction  MUSCULOSKELETAL: No arthralgia, No myalgia  SKIN: No rashes, or lesions   PSYCH: no anxiety, no suicidal ideation    All other review of systems is negative unless indicated above    Vital Signs Last 24 Hrs  T(C): 37.2 (11 Jun 2023 16:33), Max: 37.2 (11 Jun 2023 16:33)  T(F): 98.9 (11 Jun 2023 16:33), Max: 98.9 (11 Jun 2023 16:33)  HR: 92 (11 Jun 2023 16:33) (66 - 92)  BP: 155/71 (11 Jun 2023 16:33) (120/53 - 155/71)  BP(mean): --  RR: 18 (11 Jun 2023 16:33) (18 - 18)  SpO2: 96% (11 Jun 2023 16:33) (96% - 99%)    Parameters below as of 11 Jun 2023 16:33  Patient On (Oxygen Delivery Method): room air          PHYSICAL EXAM:    GENERAL: NAD  HEENT:  NC/AT, EOMI, PERRLA, No scleral icterus, Moist mucous membranes  NECK: Supple, No JVD  CNS:  Alert & Oriented X3, Motor Strength 5/5 B/L upper and lower extremities; DTRs 2+ intact   LUNG: Normal Breath sounds, Clear to auscultation bilaterally, No rales, ++rhonchi, No wheezing  HEART: RRR; No murmurs, No rubs  ABDOMEN: +BS, ST/ND/NT  GENITOURINARY: Voiding, Bladder not distended  EXTREMITIES:  2+ Peripheral Pulses, No clubbing, No cyanosis, No tibial edema  MUSCULOSKELTAL: Joints normal ROM, No TTP, No effusion  VAGINAL: deferred  SKIN: no rashes  RECTAL: deferred, not indicated  BREAST: deferred            Vancomycin levels:   Cultures:     MEDICATIONS  (STANDING):  acetylcysteine 10%  Inhalation 4 milliLiter(s) Inhalation three times a day  albuterol/ipratropium for Nebulization 3 milliLiter(s) Nebulizer every 6 hours  apixaban 5 milliGRAM(s) Oral every 12 hours  atorvastatin 20 milliGRAM(s) Oral at bedtime  buDESOnide    Inhalation Suspension 0.5 milliGRAM(s) Inhalation two times a day  glucagon  Injectable 1 milliGRAM(s) IntraMuscular once  guaiFENesin  milliGRAM(s) Oral every 12 hours  insulin glargine Injectable (LANTUS) 50 Unit(s) SubCutaneous at bedtime  insulin lispro (ADMELOG) corrective regimen sliding scale   SubCutaneous Before meals and at bedtime  insulin lispro Injectable (ADMELOG) 5 Unit(s) SubCutaneous three times a day before meals  methylPREDNISolone sodium succinate Injectable 20 milliGRAM(s) IV Push every 12 hours  mexiletine 200 milliGRAM(s) Oral every 8 hours  montelukast 10 milliGRAM(s) Oral at bedtime  pantoprazole    Tablet 40 milliGRAM(s) Oral before breakfast  trimethoprim  160 mG/sulfamethoxazole 800 mG 1 Tablet(s) Oral daily    MEDICATIONS  (PRN):  albuterol    90 MICROgram(s) HFA Inhaler 2 Puff(s) Inhalation every 4 hours PRN Shortness of Breath and/or Wheezing  dextrose Oral Gel 15 Gram(s) Oral once PRN Blood Glucose LESS THAN 70 milliGRAM(s)/deciliter  ondansetron   Disintegrating Tablet 4 milliGRAM(s) Oral every 6 hours PRN Nausea  polyethylene glycol 3350 17 Gram(s) Oral daily PRN Constipation      all labs reviewed  all imaging reviewed        72F hx asthma on chronic steroids, DM, pAfib on Eliquis, colorectal ca yrs ago s/p colostomy, recent admission for parainfluenza, PNA. P/w SOB, and elevated BG, not in DKA.    #Acute resp failure due to asthma exacerbation and bronchopneumonia  -on RA  -CT chest improved  Sputum Cx: Proteus, yeast  ID evaluation   s/p 5d Vanco IV, on Aztreonam day#7/&  -steroids now Methylpred 20iv q12 ,  will need prolonged taper  -nebs, mucomyst, Smart Vest   -appreciate pulm and ID input    #IDDM  -Lantus 50u qHS, ISS  -monitor BG while on steroids    #PCP ppx prevention given chronic steroids  -Bactrim TIW    #Afib  -Eliquis, Mexilitine    #DVT ppx- full a/c w Eliquis    PT eval    Dispo: f/u ID consult regarding sputum Cx, needs prolonged steroid taper outpatient  dc planning 24-48hr    
Date of service: 06-15-23 @ 13:57    pt seen and examined  feels better  grew esbl proteus from sputum   has cough  afebrile     ROS: no fever or chills; denies dizziness, no HA, no abdominal pain, no diarrhea or constipation; no dysuria, no urinary frequency, no legs pain, no rashes    MEDICATIONS  (STANDING):  acetylcysteine 10%  Inhalation 4 milliLiter(s) Inhalation three times a day  albuterol/ipratropium for Nebulization 3 milliLiter(s) Nebulizer every 6 hours  apixaban 5 milliGRAM(s) Oral every 12 hours  atorvastatin 20 milliGRAM(s) Oral at bedtime  buDESOnide    Inhalation Suspension 0.5 milliGRAM(s) Inhalation two times a day  dextrose 5%. 1000 milliLiter(s) (50 mL/Hr) IV Continuous <Continuous>  dextrose 5%. 1000 milliLiter(s) (100 mL/Hr) IV Continuous <Continuous>  dextrose 50% Injectable 50 milliLiter(s) IV Push every 15 minutes  dextrose 50% Injectable 25 milliLiter(s) IV Push every 15 minutes  dextrose 50% Injectable 25 Gram(s) IV Push once  dextrose 50% Injectable 12.5 Gram(s) IV Push once  dextrose 50% Injectable 25 Gram(s) IV Push once  ertapenem  IVPB 1000 milliGRAM(s) IV Intermittent every 24 hours  glucagon  Injectable 1 milliGRAM(s) IntraMuscular once  guaiFENesin  milliGRAM(s) Oral every 12 hours  insulin glargine Injectable (LANTUS) 50 Unit(s) SubCutaneous at bedtime  insulin lispro (ADMELOG) corrective regimen sliding scale   SubCutaneous Before meals and at bedtime  insulin lispro Injectable (ADMELOG) 5 Unit(s) SubCutaneous three times a day before meals  methylPREDNISolone sodium succinate Injectable 20 milliGRAM(s) IV Push every 12 hours  mexiletine 200 milliGRAM(s) Oral every 8 hours  montelukast 10 milliGRAM(s) Oral at bedtime  nystatin    Suspension 233346 Unit(s) Oral four times a day  pantoprazole    Tablet 40 milliGRAM(s) Oral before breakfast  trimethoprim  160 mG/sulfamethoxazole 800 mG 1 Tablet(s) Oral daily    Vital Signs Last 24 Hrs  T(C): 37.1 (15 Christopher 2023 07:30), Max: 37.1 (15 Christopher 2023 07:30)  T(F): 98.8 (15 Christopher 2023 07:30), Max: 98.8 (15 Christopher 2023 07:30)  HR: 83 (15 Christopher 2023 07:30) (83 - 92)  BP: 138/81 (15 Christopher 2023 07:30) (138/81 - 147/68)  BP(mean): --  RR: 18 (15 Christopher 2023 07:30) (18 - 18)  SpO2: 97% (15 Christopher 2023 07:30) (95% - 97%)    Parameters below as of 15 Christopher 2023 07:30  Patient On (Oxygen Delivery Method): room air        PE:  Constitutional: frail looking  HEENT: NC/AT, EOMI, PERRLA, conjunctivae clear; ears and nose atraumatic; pharynx benign  Neck: supple; thyroid not palpable  Back: no tenderness  Respiratory: decreased breath sounds, rhonchi   Cardiovascular: S1S2 regular, no murmurs  Abdomen: soft, not tender, not distended, positive BS; liver and spleen WNL  Genitourinary: no suprapubic tenderness  Lymphatic: no LN palpable  Musculoskeletal: no muscle tenderness, no joint swelling or tenderness  Extremities: no pedal edema  Neurological/ Psychiatric: moving all extremities  Skin: no rashes; no palpable lesions    Labs: all available labs reviewed                                12.3   51 )-----------( 239      ( 15 Christopher 2023 08:37 )             41.8          Vancomycin Level, Trough: 14.7 ug/mL (06-06 @ 21:37)      Urinalysis Basic - ( 2023 03:40 )    Color: Yellow / Appearance: Clear / S.020 / pH: x  Gluc: x / Ketone: Negative  / Bili: Negative / Urobili: Negative   Blood: x / Protein: 15 / Nitrite: Negative   Leuk Esterase: Negative / RBC: 0-2 /HPF / WBC 0-2 /HPF   Sq Epi: x / Non Sq Epi: x / Bacteria: Negative    Culture - Blood (23 @ 04:00)   Specimen Source: .Blood Blood-Peripheral  Culture Results:   No growth to date.  Specimen Source: .Sputum Sputum  Culture Results:   Numerous Proteus mirabilis ESBL   Normal Respiratory Jessica present  Organism Identification: Proteus mirabilis ESBL  Organism: Proteus mirabilis ESBL  Method Type: GARRETT  Radiology: all available radiological tests reviewed    ACC: [id number] EXAM:  CT CHEST   ORDERED BY: DELONTE NUGENT     PROCEDURE DATE:  2023          INTERPRETATION:  CLINICAL INFORMATION: Shortness of breath. History of   lung disease.    COMPARISON: 2023.    CONTRAST/COMPLICATIONS:  IV Contrast: NONE  Oral Contrast: NONE  Complications: None reported at time of study completion    PROCEDURE:  CT of the Chest was performed.  Sagittal and coronal reformats were performed.    FINDINGS:    LUNGS AND AIRWAYS: Patent central airways.  Overall improved appearance   of previously noted nodules. Small residual clusters of tree-in-bud type   nodules within the right lung base are appreciated within the right   middle and right lower lobes. There are no confluent airspace opacities.   Linear atelectasis or scarring is seen within the lingula.  PLEURA: No pleural effusion.  MEDIASTINUM AND MARANDA: No lymphadenopathy. A small hiatal hernia.  VESSELS: Ascending aortic prosthetic. Coronary and aortic calcifications   are appreciated. 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 This appearance is stable   compared to previous exam  HEART: Rate valve repair changes are appreciated. The heart size is   normal. Valvular calcifications are noted. No pericardial effusion.  CHEST WALL AND LOWER NECK: Within normal limits.  VISUALIZED UPPER ABDOMEN: Punctate calcified granuloma of the right   hepatic lobe. Calcification of the left kidney may be parenchymal in   nature. Alternatively, this maybe related to complex cyst. If clinically   sonography or contrast enhancement or MRI or CT can further delineation.  Cystic lesions of the pancreatic tail are again noted and are stable.  BONES: Poststernotomy changes. Mottled appearance of the osseous   structures, which may be related to osteopenia pattern, though may also   indicate underlying metabolic or hematologic condition. Correlate   clinically.    IMPRESSION:  Interval improvement of infectious/inflammatory bronchiolitis/bronchitis.   Mild residual clusters of tree in bud type nodularity are seen at the   right middle and right lower lobes inferiorly.    Additional findings as above.        Advanced directives addressed: full resuscitation
HOSPITALIST ATTENDING PROGRESS NOTE    Chart and meds reviewed.  Patient seen and examined.    CC: malaise    Subjective: Reports continued SOB, cough w yellow sputum.     All other systems reviewed and found to be negative with the exception of what has been described above.    MEDICATIONS  (STANDING):  acetylcysteine 10%  Inhalation 4 milliLiter(s) Inhalation three times a day  albuterol/ipratropium for Nebulization 3 milliLiter(s) Nebulizer every 6 hours  apixaban 5 milliGRAM(s) Oral every 12 hours  atorvastatin 20 milliGRAM(s) Oral at bedtime  aztreonam  IVPB 1000 milliGRAM(s) IV Intermittent every 8 hours  buDESOnide    Inhalation Suspension 0.5 milliGRAM(s) Inhalation two times a day  dextrose 5%. 1000 milliLiter(s) (50 mL/Hr) IV Continuous <Continuous>  dextrose 5%. 1000 milliLiter(s) (100 mL/Hr) IV Continuous <Continuous>  dextrose 50% Injectable 50 milliLiter(s) IV Push every 15 minutes  dextrose 50% Injectable 25 milliLiter(s) IV Push every 15 minutes  dextrose 50% Injectable 25 Gram(s) IV Push once  dextrose 50% Injectable 25 Gram(s) IV Push once  dextrose 50% Injectable 12.5 Gram(s) IV Push once  glucagon  Injectable 1 milliGRAM(s) IntraMuscular once  guaiFENesin  milliGRAM(s) Oral every 12 hours  insulin glargine Injectable (LANTUS) 55 Unit(s) SubCutaneous at bedtime  insulin lispro (ADMELOG) corrective regimen sliding scale   SubCutaneous Before meals and at bedtime  insulin lispro Injectable (ADMELOG) 5 Unit(s) SubCutaneous three times a day before meals  methylPREDNISolone sodium succinate Injectable 20 milliGRAM(s) IV Push every 12 hours  mexiletine 200 milliGRAM(s) Oral every 8 hours  montelukast 10 milliGRAM(s) Oral at bedtime  pantoprazole    Tablet 40 milliGRAM(s) Oral before breakfast  trimethoprim  160 mG/sulfamethoxazole 800 mG 1 Tablet(s) Oral daily  vancomycin  IVPB 1000 milliGRAM(s) IV Intermittent every 12 hours    MEDICATIONS  (PRN):  albuterol    90 MICROgram(s) HFA Inhaler 2 Puff(s) Inhalation every 4 hours PRN Shortness of Breath and/or Wheezing  dextrose Oral Gel 15 Gram(s) Oral once PRN Blood Glucose LESS THAN 70 milliGRAM(s)/deciliter  ondansetron   Disintegrating Tablet 4 milliGRAM(s) Oral every 6 hours PRN Nausea  polyethylene glycol 3350 17 Gram(s) Oral daily PRN Constipation      VITALS:  T(F): 97.6 (06-08-23 @ 08:16), Max: 97.7 (06-07-23 @ 16:00)  HR: 74 (06-08-23 @ 08:16) (74 - 90)  BP: 131/65 (06-08-23 @ 08:16) (114/68 - 131/65)  RR: 20 (06-08-23 @ 08:16) (20 - 21)  SpO2: 98% (06-08-23 @ 08:16) (95% - 98%)  Wt(kg): --    I&O's Summary    07 Jun 2023 07:01  -  08 Jun 2023 07:00  --------------------------------------------------------  IN: 350 mL / OUT: 0 mL / NET: 350 mL        CAPILLARY BLOOD GLUCOSE      POCT Blood Glucose.: 238 mg/dL (08 Jun 2023 13:30)  POCT Blood Glucose.: 74 mg/dL (08 Jun 2023 07:45)  POCT Blood Glucose.: 229 mg/dL (07 Jun 2023 21:30)  POCT Blood Glucose.: 141 mg/dL (07 Jun 2023 17:20)      PHYSICAL EXAM:  Gen: NAD  HEENT:  pupils equal and reactive, EOMI, no oropharyngeal lesions, erythema, exudates, oral thrush  NECK:   supple, no carotid bruits, no palpable lymph nodes, no thyromegaly  CV:  +S1, +S2, regular, no murmurs or rubs  RESP:   fair air entry, b/l wheeze/rhonchi  BREAST:  not examined  GI:  abdomen soft, non-tender, non-distended, normal BS, no bruits, no abdominal masses, no palpable masses  RECTAL:  not examined  :  not examined  MSK:   normal muscle tone, no atrophy, no rigidity, no contractions  EXT:  no clubbing, no cyanosis, no edema, no calf pain, swelling or erythema  VASCULAR:  pulses equal and symmetric in the upper and lower extremities  NEURO:  AAOX3, no focal neurological deficits, follows all commands, able to move extremities spontaneously  SKIN:  no ulcers, lesions or rashes    LABS:    CULTURES:  BCx NG  UCX 10-49K    Additional results/Imaging, I have personally reviewed:  CT chest noncon 6/4/23: Interval improvement of infectious/inflammatory bronchiolitis/bronchitis. Mild residual clusters of tree in bud type nodularity are seen at the right middle and right lower lobes inferiorly.    CXR 6/4/23: No acute pulmonary disease.      
HOSPITALIST ATTENDING PROGRESS NOTE    Chart and meds reviewed.  Patient seen and examined.    CC: malaise    Subjective: Reports continued SOB, cough w yellow sputum.     All other systems reviewed and found to be negative with the exception of what has been described above.    MEDICATIONS  (STANDING):  acetylcysteine 10%  Inhalation 4 milliLiter(s) Inhalation three times a day  albuterol/ipratropium for Nebulization 3 milliLiter(s) Nebulizer every 6 hours  apixaban 5 milliGRAM(s) Oral every 12 hours  atorvastatin 20 milliGRAM(s) Oral at bedtime  aztreonam  IVPB 1000 milliGRAM(s) IV Intermittent every 8 hours  buDESOnide    Inhalation Suspension 0.5 milliGRAM(s) Inhalation two times a day  glucagon  Injectable 1 milliGRAM(s) IntraMuscular once  guaiFENesin  milliGRAM(s) Oral every 12 hours  insulin glargine Injectable (LANTUS) 55 Unit(s) SubCutaneous at bedtime  insulin lispro (ADMELOG) corrective regimen sliding scale   SubCutaneous Before meals and at bedtime  insulin lispro Injectable (ADMELOG) 5 Unit(s) SubCutaneous three times a day before meals  methylPREDNISolone sodium succinate Injectable 20 milliGRAM(s) IV Push every 12 hours  mexiletine 200 milliGRAM(s) Oral every 8 hours  montelukast 10 milliGRAM(s) Oral at bedtime  pantoprazole    Tablet 40 milliGRAM(s) Oral before breakfast  trimethoprim  160 mG/sulfamethoxazole 800 mG 1 Tablet(s) Oral daily  vancomycin  IVPB 1000 milliGRAM(s) IV Intermittent every 12 hours    MEDICATIONS  (PRN):  albuterol    90 MICROgram(s) HFA Inhaler 2 Puff(s) Inhalation every 4 hours PRN Shortness of Breath and/or Wheezing  dextrose Oral Gel 15 Gram(s) Oral once PRN Blood Glucose LESS THAN 70 milliGRAM(s)/deciliter  ondansetron   Disintegrating Tablet 4 milliGRAM(s) Oral every 6 hours PRN Nausea  polyethylene glycol 3350 17 Gram(s) Oral daily PRN Constipation      Vital Signs Last 24 Hrs  T(C): 36.3 (09 Jun 2023 16:09), Max: 36.6 (09 Jun 2023 08:00)  T(F): 97.4 (09 Jun 2023 16:09), Max: 97.9 (09 Jun 2023 08:00)  HR: 84 (09 Jun 2023 16:09) (71 - 86)  BP: 125/71 (09 Jun 2023 16:09) (111/70 - 131/63)  BP(mean): 82 (09 Jun 2023 16:09) (82 - 82)  RR: 18 (09 Jun 2023 16:09) (18 - 19)  SpO2: 100% (09 Jun 2023 16:09) (98% - 100%)    Parameters below as of 09 Jun 2023 16:09  Patient On (Oxygen Delivery Method): room air          CAPILLARY BLOOD GLUCOSE      POCT Blood Glucose.: 238 mg/dL (08 Jun 2023 13:30)  POCT Blood Glucose.: 74 mg/dL (08 Jun 2023 07:45)  POCT Blood Glucose.: 229 mg/dL (07 Jun 2023 21:30)  POCT Blood Glucose.: 141 mg/dL (07 Jun 2023 17:20)      PHYSICAL EXAM:  Gen: NAD  HEENT:  pupils equal and reactive, EOMI, no oropharyngeal lesions, erythema, exudates, oral thrush  NECK:   supple, no carotid bruits, no palpable lymph nodes, no thyromegaly  CV:  +S1, +S2, regular, no murmurs or rubs  RESP:   fair air entry, +bilat ronchi, no wheezing   BREAST:  not examined  GI:  abdomen soft, non-tender, non-distended, normal BS, no bruits, no abdominal masses, no palpable masses  RECTAL:  not examined  :  not examined  MSK:   normal muscle tone, no atrophy, no rigidity, no contractions  EXT:  no clubbing, no cyanosis, no edema, no calf pain, swelling or erythema  VASCULAR:  pulses equal and symmetric in the upper and lower extremities  NEURO:  AAOX3, no focal neurological deficits, follows all commands, able to move extremities spontaneously  SKIN:  no ulcers, lesions or rashes    LABS:    CULTURES:  BCx NG  UCX 10-49K    Additional results/Imaging, I have personally reviewed:  CT chest noncon 6/4/23: Interval improvement of infectious/inflammatory bronchiolitis/bronchitis. Mild residual clusters of tree in bud type nodularity are seen at the right middle and right lower lobes inferiorly.    CXR 6/4/23: No acute pulmonary disease.      72F hx asthma on chronic steroids, DM, pAfib on Eliquis, colorectal ca yrs ago s/p colostomy, recent admission for parainfluenza, PNA. P/w SOB, and elevated BG, not in DKA.    #Acute resp failure due to asthma exacerbation  -on RA  -CT chest improved  -steroids now methylpred 20iv q12 per pulm f/u pulm recs, will need prolonged taper  -5d broad spectrum abx, to end 6/9; will c/w IV abx to complete 7d  -nebs  -appreciate pulm and ID input    #IDDM  -Lantus 50u qHS, ISS  -monitor BG while on steroids    #PCP ppx prevention given chronic steroids  -Bactrim    #Afib  -Eliquis, Mexilitine    #DVT ppx- full a/c w Eliquis    PT eval    Dispo pending clinical improvement, >48hrs, needs prolonged steroid taper    
Subjective:    Awake, alert. No new complaints.    MEDICATIONS  (STANDING):  acetylcysteine 10%  Inhalation 4 milliLiter(s) Inhalation three times a day  albuterol/ipratropium for Nebulization 3 milliLiter(s) Nebulizer every 6 hours  apixaban 5 milliGRAM(s) Oral every 12 hours  atorvastatin 20 milliGRAM(s) Oral at bedtime  buDESOnide    Inhalation Suspension 0.5 milliGRAM(s) Inhalation two times a day  dextrose 5%. 1000 milliLiter(s) (50 mL/Hr) IV Continuous <Continuous>  dextrose 5%. 1000 milliLiter(s) (100 mL/Hr) IV Continuous <Continuous>  dextrose 50% Injectable 50 milliLiter(s) IV Push every 15 minutes  dextrose 50% Injectable 25 milliLiter(s) IV Push every 15 minutes  dextrose 50% Injectable 25 Gram(s) IV Push once  dextrose 50% Injectable 25 Gram(s) IV Push once  dextrose 50% Injectable 12.5 Gram(s) IV Push once  ertapenem  IVPB 1000 milliGRAM(s) IV Intermittent every 24 hours  glucagon  Injectable 1 milliGRAM(s) IntraMuscular once  guaiFENesin  milliGRAM(s) Oral every 12 hours  insulin glargine Injectable (LANTUS) 50 Unit(s) SubCutaneous at bedtime  insulin lispro (ADMELOG) corrective regimen sliding scale   SubCutaneous Before meals and at bedtime  insulin lispro Injectable (ADMELOG) 5 Unit(s) SubCutaneous three times a day before meals  methylPREDNISolone sodium succinate Injectable 20 milliGRAM(s) IV Push every 12 hours  mexiletine 200 milliGRAM(s) Oral every 8 hours  montelukast 10 milliGRAM(s) Oral at bedtime  nystatin    Suspension 685410 Unit(s) Oral four times a day  pantoprazole    Tablet 40 milliGRAM(s) Oral before breakfast  trimethoprim  160 mG/sulfamethoxazole 800 mG 1 Tablet(s) Oral daily    MEDICATIONS  (PRN):  albuterol    90 MICROgram(s) HFA Inhaler 2 Puff(s) Inhalation every 4 hours PRN Shortness of Breath and/or Wheezing  dextrose Oral Gel 15 Gram(s) Oral once PRN Blood Glucose LESS THAN 70 milliGRAM(s)/deciliter  diphenhydrAMINE 50 milliGRAM(s) Oral every 6 hours PRN Rash and/or Itching  ondansetron   Disintegrating Tablet 4 milliGRAM(s) Oral every 6 hours PRN Nausea  polyethylene glycol 3350 17 Gram(s) Oral daily PRN Constipation      Allergies    Dilaudid (Short breath)  tetanus toxoid (Short breath)  cefepime (Anaphylaxis)  Valium (Short breath)  Avelox (Short breath; Pruritus)  iodine (Short breath; Swelling)  penicillin (Anaphylaxis)  codeine (Short breath)  shellfish (Anaphylaxis)  aspirin (Short breath)    Intolerances        Vital Signs Last 24 Hrs  T(C): 37.1 (15 Christopher 2023 07:30), Max: 37.1 (15 Christopher 2023 07:30)  T(F): 98.8 (15 Christopher 2023 07:30), Max: 98.8 (15 Christopher 2023 07:30)  HR: 83 (15 Christopher 2023 07:30) (83 - 92)  BP: 138/81 (15 Christopher 2023 07:30) (138/81 - 147/68)  BP(mean): --  RR: 18 (15 Christopher 2023 07:30) (18 - 18)  SpO2: 97% (15 Christopher 2023 07:30) (95% - 97%)    Parameters below as of 15 Christopher 2023 07:30  Patient On (Oxygen Delivery Method): room air        PHYSICAL EXAMINATION:    NECK:  Supple. No lymphadenopathy. Jugular venous pressure not elevated.   HEART:   The cardiac impulse has a normal quality. Reg., Nl S1 and S2.   CHEST:  Chest with scattered wheezes/rhonchi. Normal respiratory effort.  ABDOMEN:  Soft and nontender.   EXTREMITIES:  There is no edema.       LABS:                RADIOLOGY & ADDITIONAL TESTS: < from: Xray Chest 1 View- PORTABLE-Routine (Xray Chest 1 View- PORTABLE-Routine .) (06.12.23 @ 11:06) >  ACC: [id number] EXAM:  XR CHEST PORTABLE ROUTINE 1V   ORDERED BY: JACQUES GONZALES     PROCEDURE DATE:  06/12/2023          INTERPRETATION:  Portable chest radiograph    CLINICAL INFORMATION: Asthma    TECHNIQUE:  Portable  AP chest radiograph.    COMPARISON: CT chest 6/4/2023 . Chest x-ray 6/4/2023    FINDINGS:  CATHETERS AND TUBES: None    PULMONARY: Prominent bilateral bronchovascular markings without large   airspace consolidation or effusion. No pneumothorax.    HEART/VASCULAR: The heart and mediastinum size and configuration are   within normal limits. Status post cardiac valve replacement.    BONES: Visualized osseous thorax intact.    IMPRESSION:   No radiographic evidence of active chest disease.    MAURA BANKS MD        Assessment and Plan:   	  - Ertapenem started for Proteus. Need to discuss length of Tx w/ ID. Bactrim for PCP PPx  - IV solumedrol to be continued @ 20MG Q12H. Will switch to orals when closer to D/C  - Aerosols with Duoneb/Budesonide  - Mucomyst  - Chest PT  - Smart Vest for use BID  - Mucinex  - OOB to chair      Problem/Recommendation - 1:  ·  Acute hypoxemic respiratory failure.   Problem/Recommendation - 2:  ·  Asthma, severe.   Problem/Recommendation - 3:  ·  Bronchiectasis.   Problem/Recommendation - 4:  ·  Tracheobronchomalacia.   Problem/Recommendation - 5:  ·  Dyspnea. 
Subjective:    Awake, alert. Still w/ cough and purulent sputum.    MEDICATIONS  (STANDING):  acetylcysteine 10%  Inhalation 4 milliLiter(s) Inhalation three times a day  albuterol/ipratropium for Nebulization 3 milliLiter(s) Nebulizer every 6 hours  apixaban 5 milliGRAM(s) Oral every 12 hours  atorvastatin 20 milliGRAM(s) Oral at bedtime  aztreonam  IVPB 1000 milliGRAM(s) IV Intermittent every 8 hours  buDESOnide    Inhalation Suspension 0.5 milliGRAM(s) Inhalation two times a day  dextrose 5%. 1000 milliLiter(s) (50 mL/Hr) IV Continuous <Continuous>  dextrose 5%. 1000 milliLiter(s) (100 mL/Hr) IV Continuous <Continuous>  dextrose 50% Injectable 50 milliLiter(s) IV Push every 15 minutes  dextrose 50% Injectable 25 milliLiter(s) IV Push every 15 minutes  dextrose 50% Injectable 12.5 Gram(s) IV Push once  dextrose 50% Injectable 25 Gram(s) IV Push once  dextrose 50% Injectable 25 Gram(s) IV Push once  glucagon  Injectable 1 milliGRAM(s) IntraMuscular once  guaiFENesin  milliGRAM(s) Oral every 12 hours  insulin glargine Injectable (LANTUS) 55 Unit(s) SubCutaneous at bedtime  insulin lispro (ADMELOG) corrective regimen sliding scale   SubCutaneous Before meals and at bedtime  insulin lispro Injectable (ADMELOG) 5 Unit(s) SubCutaneous three times a day before meals  methylPREDNISolone sodium succinate Injectable 20 milliGRAM(s) IV Push every 12 hours  mexiletine 200 milliGRAM(s) Oral every 8 hours  montelukast 10 milliGRAM(s) Oral at bedtime  pantoprazole    Tablet 40 milliGRAM(s) Oral before breakfast  trimethoprim  160 mG/sulfamethoxazole 800 mG 1 Tablet(s) Oral daily  vancomycin  IVPB 1000 milliGRAM(s) IV Intermittent every 12 hours    MEDICATIONS  (PRN):  albuterol    90 MICROgram(s) HFA Inhaler 2 Puff(s) Inhalation every 4 hours PRN Shortness of Breath and/or Wheezing  dextrose Oral Gel 15 Gram(s) Oral once PRN Blood Glucose LESS THAN 70 milliGRAM(s)/deciliter  ondansetron   Disintegrating Tablet 4 milliGRAM(s) Oral every 6 hours PRN Nausea  polyethylene glycol 3350 17 Gram(s) Oral daily PRN Constipation      Allergies    Dilaudid (Short breath)  tetanus toxoid (Short breath)  cefepime (Anaphylaxis)  Valium (Short breath)  Avelox (Short breath; Pruritus)  iodine (Short breath; Swelling)  penicillin (Anaphylaxis)  codeine (Short breath)  shellfish (Anaphylaxis)  aspirin (Short breath)    Intolerances        Vital Signs Last 24 Hrs  T(C): 36.4 (08 Jun 2023 08:16), Max: 36.5 (07 Jun 2023 16:00)  T(F): 97.6 (08 Jun 2023 08:16), Max: 97.7 (07 Jun 2023 16:00)  HR: 74 (08 Jun 2023 08:16) (74 - 92)  BP: 131/65 (08 Jun 2023 08:16) (114/68 - 131/65)  BP(mean): 82 (07 Jun 2023 16:00) (82 - 89)  RR: 20 (08 Jun 2023 08:16) (20 - 25)  SpO2: 98% (08 Jun 2023 08:16) (94% - 98%)    Parameters below as of 08 Jun 2023 08:16  Patient On (Oxygen Delivery Method): room air        PHYSICAL EXAMINATION:    NECK:  Supple. No lymphadenopathy. Jugular venous pressure not elevated.   HEART:   The cardiac impulse has a normal quality. Reg., Nl S1 and S2.   CHEST:  Chest with scattered rhonchi and exp. wheezes. Normal respiratory effort.  ABDOMEN:  Soft and nontender.   EXTREMITIES:  There is tr edema.       LABS:                RADIOLOGY & ADDITIONAL TESTS:    Assessment and Plan:   	  - Broad spectrum Abx-Vanco/Aztreonam- per ID. Bactrim added  for PCP PPx    - IV solumedrol to be re-started @ 20MG Q12H due to persistent bronchospasm and chest tightness  - Aerosols with Duoneb/Budesonide  - Mucomyst  - Chest PT  - To resume Smart Vest for use BID today  - Mucinex  - OOB to chair    Problem/Recommendation - 1:  ·  Acute hypoxemic respiratory failure.   Problem/Recommendation - 2:  ·  Asthma, severe.   Problem/Recommendation - 3:  ·  Bronchiectasis.   Problem/Recommendation - 4:  ·  Tracheobronchomalacia.   Problem/Recommendation - 5:  ·  Dyspnea.   Problem/Recommendation 
72F hx/o severe persistent asthma on chronic steroids, previously in ICU,  DM, pAfib on Eliquis, colorectal ca yrs ago s/p colostomy, recent admission for parainfluenza, PNA. P/w SOB, and elevated BG, not in DKA.    Additional results/Imaging, I have personally reviewed:  CT chest noncon 6/4/23: Interval improvement of infectious/inflammatory bronchiolitis/bronchitis. Mild residual clusters of tree in bud type nodularity are seen at the right middle and right lower lobes inferiorly.    CXR 6/4/23: No acute pulmonary disease.    6.10: +deep productive coughing, gray sputum, +KRAMER, no wheezing             REVIEW OF SYSTEMS:    CONSTITUTIONAL: No weakness, No fevers or chills  ENT: No ear ache, No sorethroat  NECK: No pain, No stiffness  RESPIRATORY: No cough, No wheezing, No hemoptysis; No dyspnea  CARDIOVASCULAR: No chest pain, No palpitations  GASTROINTESTINAL: No abd pain, No nausea, No vomiting, No hematemesis, No diarrhea or constipation. No melena, No hematochezia.  GENITOURINARY: No dysuria, No  hematuria  NEUROLOGICAL: No diplopia, No paresthesia, No motor dysfunction  MUSCULOSKELETAL: No arthralgia, No myalgia  SKIN: No rashes, or lesions   PSYCH: no anxiety, no suicidal ideation    All other review of systems is negative unless indicated above    Vital Signs Last 24 Hrs  T(C): 36.3 (10 Christopher 2023 15:20), Max: 36.4 (09 Jun 2023 22:07)  T(F): 97.4 (10 Christopher 2023 15:20), Max: 97.6 (09 Jun 2023 22:07)  HR: 78 (10 Christopher 2023 15:20) (65 - 91)  BP: 113/49 (10 Christopher 2023 15:20) (113/49 - 144/73)  BP(mean): --  RR: 18 (10 Christopher 2023 15:20) (18 - 18)  SpO2: 96% (10 Christopher 2023 15:20) (94% - 97%)    Parameters below as of 10 Christopher 2023 15:20  Patient On (Oxygen Delivery Method): room air        PHYSICAL EXAM:    GENERAL: NAD  HEENT:  NC/AT, EOMI, PERRLA, No scleral icterus, Moist mucous membranes  NECK: Supple, No JVD  CNS:  Alert & Oriented X3, Motor Strength 5/5 B/L upper and lower extremities; DTRs 2+ intact   LUNG: Normal Breath sounds, Clear to auscultation bilaterally, No rales, No rhonchi, No wheezing  HEART: RRR; No murmurs, No rubs  ABDOMEN: +BS, ST/ND/NT  GENITOURINARY: Voiding, Bladder not distended  EXTREMITIES:  2+ Peripheral Pulses, No clubbing, No cyanosis, No tibial edema  MUSCULOSKELTAL: Joints normal ROM, No TTP, No effusion  VAGINAL: deferred  SKIN: no rashes  RECTAL: deferred, not indicated  BREAST: deferred            Vancomycin levels:   Cultures:     MEDICATIONS  (STANDING):  acetylcysteine 10%  Inhalation 4 milliLiter(s) Inhalation three times a day  albuterol/ipratropium for Nebulization 3 milliLiter(s) Nebulizer every 6 hours  apixaban 5 milliGRAM(s) Oral every 12 hours  atorvastatin 20 milliGRAM(s) Oral at bedtime  buDESOnide    Inhalation Suspension 0.5 milliGRAM(s) Inhalation two times a day  glucagon  Injectable 1 milliGRAM(s) IntraMuscular once  guaiFENesin  milliGRAM(s) Oral every 12 hours  insulin glargine Injectable (LANTUS) 50 Unit(s) SubCutaneous at bedtime  insulin lispro (ADMELOG) corrective regimen sliding scale   SubCutaneous Before meals and at bedtime  insulin lispro Injectable (ADMELOG) 5 Unit(s) SubCutaneous three times a day before meals  methylPREDNISolone sodium succinate Injectable 20 milliGRAM(s) IV Push every 12 hours  mexiletine 200 milliGRAM(s) Oral every 8 hours  montelukast 10 milliGRAM(s) Oral at bedtime  pantoprazole    Tablet 40 milliGRAM(s) Oral before breakfast  trimethoprim  160 mG/sulfamethoxazole 800 mG 1 Tablet(s) Oral daily    MEDICATIONS  (PRN):  albuterol    90 MICROgram(s) HFA Inhaler 2 Puff(s) Inhalation every 4 hours PRN Shortness of Breath and/or Wheezing  dextrose Oral Gel 15 Gram(s) Oral once PRN Blood Glucose LESS THAN 70 milliGRAM(s)/deciliter  ondansetron   Disintegrating Tablet 4 milliGRAM(s) Oral every 6 hours PRN Nausea  polyethylene glycol 3350 17 Gram(s) Oral daily PRN Constipation      all labs reviewed  all imaging reviewed        72F hx asthma on chronic steroids, DM, pAfib on Eliquis, colorectal ca yrs ago s/p colostomy, recent admission for parainfluenza, PNA. P/w SOB, and elevated BG, not in DKA.    #Acute resp failure due to asthma exacerbation and bronchopneumonia  -on RA  -CT chest improved  -steroids now methylpred 20iv q12 per pulm f/u pulm recs, will need prolonged taper  -5d broad spectrum abx, to end 6/9; will c/w IV Aztreonam  to complete 7d  -nebs  -appreciate pulm and ID input    #IDDM  -Lantus 50u qHS, ISS  -monitor BG while on steroids    #PCP ppx prevention given chronic steroids  -Bactrim    #Afib  -Eliquis, Mexilitine    #DVT ppx- full a/c w Eliquis    PT eval    Dispo pending clinical improvement, >48hrs, needs prolonged steroid taper    
72F hx/o severe persistent asthma on chronic steroids, previously in ICU,  DM, pAfib on Eliquis, colorectal ca yrs ago s/p colostomy, recent admission for parainfluenza, PNA. P/w SOB, found to have bronchopna and asthma exacerbation     Additional results/Imaging, I have personally reviewed:  CT chest noncon 6/4/23: Interval improvement of infectious/inflammatory bronchiolitis/bronchitis. Mild residual clusters of tree in bud type nodularity are seen at the right middle and right lower lobes inferiorly.    CXR 6/4/23: No acute pulmonary disease.    6.10: +deep productive coughing, gray sputum, +KRAMER, no wheezing   6.11: deep coughing , no wheezing, no fever/chills, +KRAMER  6/12: sitting up in bed, still with productive congested cough. and dyspnea on minimal exertion.           REVIEW OF SYSTEMS:    CONSTITUTIONAL: No weakness, No fevers or chills  ENT: No ear ache, No sorethroat  NECK: No pain, No stiffness  RESPIRATORY: ++deep cough, No wheezing, No hemoptysis; ++ dyspnea  CARDIOVASCULAR: No chest pain, No palpitations  GASTROINTESTINAL: No abd pain, No nausea, No vomiting, No hematemesis, No diarrhea or constipation. No melena, No hematochezia.  GENITOURINARY: No dysuria, No  hematuria  NEUROLOGICAL: No diplopia, No paresthesia, No motor dysfunction  MUSCULOSKELETAL: No arthralgia, No myalgia  SKIN: No rashes, or lesions   PSYCH: no anxiety, no suicidal ideation    All other review of systems is negative unless indicated above    Vital Signs Last 24 Hrs  T(C): 36.2 (12 Jun 2023 08:29), Max: 37.2 (11 Jun 2023 16:33)  T(F): 97.2 (12 Jun 2023 08:29), Max: 98.9 (11 Jun 2023 16:33)  HR: 80 (12 Jun 2023 08:29) (80 - 92)  BP: 105/52 (12 Jun 2023 08:29) (105/52 - 155/71)  BP(mean): --  RR: 18 (12 Jun 2023 08:29) (18 - 18)  SpO2: 99% (12 Jun 2023 08:29) (96% - 99%)    Parameters below as of 12 Jun 2023 08:29  Patient On (Oxygen Delivery Method): room air            PHYSICAL EXAM:    GENERAL: NAD  HEENT:  NC/AT, EOMI, PERRLA, No scleral icterus, Moist mucous membranes  NECK: Supple, No JVD  CNS:  Alert & Oriented X3, Motor Strength 5/5 B/L upper and lower extremities; DTRs 2+ intact   LUNG: Normal Breath sounds, Clear to auscultation bilaterally, No rales, +coarse rhonci.   HEART: RRR; No murmurs, No rubs  ABDOMEN: +BS, ST/ND/NT  GENITOURINARY: Voiding, Bladder not distended  EXTREMITIES:  2+ Peripheral Pulses, No clubbing, No cyanosis, No tibial edema  MUSCULOSKELTAL: Joints normal ROM, No TTP, No effusion  VAGINAL: deferred  SKIN: no rashes  RECTAL: deferred, not indicated  BREAST: deferred        MEDICATIONS  (STANDING):  acetylcysteine 10%  Inhalation 4 milliLiter(s) Inhalation three times a day  albuterol/ipratropium for Nebulization 3 milliLiter(s) Nebulizer every 6 hours  apixaban 5 milliGRAM(s) Oral every 12 hours  atorvastatin 20 milliGRAM(s) Oral at bedtime  buDESOnide    Inhalation Suspension 0.5 milliGRAM(s) Inhalation two times a day  glucagon  Injectable 1 milliGRAM(s) IntraMuscular once  guaiFENesin  milliGRAM(s) Oral every 12 hours  insulin glargine Injectable (LANTUS) 50 Unit(s) SubCutaneous at bedtime  insulin lispro (ADMELOG) corrective regimen sliding scale   SubCutaneous Before meals and at bedtime  insulin lispro Injectable (ADMELOG) 5 Unit(s) SubCutaneous three times a day before meals  methylPREDNISolone sodium succinate Injectable 20 milliGRAM(s) IV Push every 12 hours  mexiletine 200 milliGRAM(s) Oral every 8 hours  montelukast 10 milliGRAM(s) Oral at bedtime  pantoprazole    Tablet 40 milliGRAM(s) Oral before breakfast  trimethoprim  160 mG/sulfamethoxazole 800 mG 1 Tablet(s) Oral daily    MEDICATIONS  (PRN):  albuterol    90 MICROgram(s) HFA Inhaler 2 Puff(s) Inhalation every 4 hours PRN Shortness of Breath and/or Wheezing  dextrose Oral Gel 15 Gram(s) Oral once PRN Blood Glucose LESS THAN 70 milliGRAM(s)/deciliter  ondansetron   Disintegrating Tablet 4 milliGRAM(s) Oral every 6 hours PRN Nausea  polyethylene glycol 3350 17 Gram(s) Oral daily PRN Constipation      all labs reviewed  all imaging reviewed        72F hx asthma on chronic steroids, DM, pAfib on Eliquis, colorectal ca yrs ago s/p colostomy, recent admission for parainfluenza, PNA. P/w SOB, and elevated BG, not in DKA.    #Acute resp failure due to asthma exacerbation and bronchopneumonia  -on RA  -CT chest improved  -Sputum Cx: Proteus, yeast --- started on Ertapenum and benadryl 50mg prior given hx of skin reaction. started on nystatin suspension   -ID evaluation   -s/p 5d Vanco IV, on Aztreonam day#7   -steroids now Methylpred 20iv q12 ,  will need prolonged taper  -nebs, mucomyst, Smart Vest   -appreciate pulm and ID input    #IDDM  -Lantus 50u qHS, ISS  -monitor BG while on steroids    #PCP ppx prevention given chronic steroids  -Bactrim TIW    #Afib  -Eliquis, Mexilitine    #DVT ppx- full a/c w Eliquis    PT eval    Dispo: f/u ID consult regarding sputum Cx, needs prolonged steroid taper outpatient  dc planning 24-48hr    
72F hx/o severe persistent asthma on chronic steroids, previously in ICU,  DM, pAfib on Eliquis, colorectal ca yrs ago s/p colostomy, recent admission for parainfluenza, PNA. P/w SOB, found to have bronchopna and asthma exacerbation     Additional results/Imaging, I have personally reviewed:  CT chest noncon 6/4/23: Interval improvement of infectious/inflammatory bronchiolitis/bronchitis. Mild residual clusters of tree in bud type nodularity are seen at the right middle and right lower lobes inferiorly.    CXR 6/4/23: No acute pulmonary disease.    6.10: +deep productive coughing, gray sputum, +KRAMER, no wheezing   6.11: deep coughing , no wheezing, no fever/chills, +KRAMER  6/12: sitting up in bed, still with productive congested cough. and dyspnea on minimal exertion.   6/13: felt tremulous today, concerned about low blood sugar  (tested to be 200). breathing improving. tolerated abx overnight.   6/14: breathing improved today. still on abx. tolerated well.       REVIEW OF SYSTEMS:    CONSTITUTIONAL: No weakness, No fevers or chills  ENT: No ear ache, No sorethroat  NECK: No pain, No stiffness  RESPIRATORY: +deep cough, No wheezing, No hemoptysis; + dyspnea slowly improving   CARDIOVASCULAR: No chest pain, No palpitations  GASTROINTESTINAL: No abd pain, No nausea, No vomiting, No hematemesis, No diarrhea or constipation. No melena, No hematochezia.  GENITOURINARY: No dysuria, No  hematuria  NEUROLOGICAL: No diplopia, No paresthesia, No motor dysfunction  MUSCULOSKELETAL: No arthralgia, No myalgia  SKIN: No rashes, or lesions   PSYCH: no anxiety, no suicidal ideation    All other review of systems is negative unless indicated above    Vital Signs Last 24 Hrs  T(C): 36.9 (14 Jun 2023 07:59), Max: 37.1 (13 Jun 2023 21:25)  T(F): 98.5 (14 Jun 2023 07:59), Max: 98.8 (13 Jun 2023 21:25)  HR: 84 (14 Jun 2023 07:59) (81 - 87)  BP: 138/71 (14 Jun 2023 07:59) (124/56 - 147/71)  BP(mean): --  RR: 18 (14 Jun 2023 07:59) (18 - 18)  SpO2: 95% (14 Jun 2023 07:59) (95% - 97%)    Parameters below as of 14 Jun 2023 07:59  Patient On (Oxygen Delivery Method): room air        PHYSICAL EXAM:    GENERAL: NAD  HEENT:  NC/AT, EOMI, PERRLA, No scleral icterus, Moist mucous membranes. exophthalmos to left eye   NECK: Supple, No JVD  CNS:  Alert & Oriented X3, Motor Strength 5/5 B/L upper and lower extremities; DTRs 2+ intact   LUNG: Normal Breath sounds, Clear to auscultation bilaterally, No rales, +coarse rhonci. --- slowly improving   HEART: RRR; No murmurs, No rubs  ABDOMEN: +BS, ST/ND/NT  GENITOURINARY: Voiding, Bladder not distended  EXTREMITIES:  2+ Peripheral Pulses, No clubbing, No cyanosis, No tibial edema  MUSCULOSKELTAL: Joints normal ROM, No TTP, No effusion  VAGINAL: deferred  SKIN: no rashes        MEDICATIONS  (STANDING):  acetylcysteine 10%  Inhalation 4 milliLiter(s) Inhalation three times a day  albuterol/ipratropium for Nebulization 3 milliLiter(s) Nebulizer every 6 hours  apixaban 5 milliGRAM(s) Oral every 12 hours  atorvastatin 20 milliGRAM(s) Oral at bedtime  buDESOnide    Inhalation Suspension 0.5 milliGRAM(s) Inhalation two times a day  glucagon  Injectable 1 milliGRAM(s) IntraMuscular once  guaiFENesin  milliGRAM(s) Oral every 12 hours  insulin glargine Injectable (LANTUS) 50 Unit(s) SubCutaneous at bedtime  insulin lispro (ADMELOG) corrective regimen sliding scale   SubCutaneous Before meals and at bedtime  insulin lispro Injectable (ADMELOG) 5 Unit(s) SubCutaneous three times a day before meals  methylPREDNISolone sodium succinate Injectable 20 milliGRAM(s) IV Push every 12 hours  mexiletine 200 milliGRAM(s) Oral every 8 hours  montelukast 10 milliGRAM(s) Oral at bedtime  pantoprazole    Tablet 40 milliGRAM(s) Oral before breakfast  trimethoprim  160 mG/sulfamethoxazole 800 mG 1 Tablet(s) Oral daily    MEDICATIONS  (PRN):  albuterol    90 MICROgram(s) HFA Inhaler 2 Puff(s) Inhalation every 4 hours PRN Shortness of Breath and/or Wheezing  dextrose Oral Gel 15 Gram(s) Oral once PRN Blood Glucose LESS THAN 70 milliGRAM(s)/deciliter  ondansetron   Disintegrating Tablet 4 milliGRAM(s) Oral every 6 hours PRN Nausea  polyethylene glycol 3350 17 Gram(s) Oral daily PRN Constipation      all labs reviewed  all imaging reviewed        72F hx asthma on chronic steroids, DM, pAfib on Eliquis, colorectal ca yrs ago s/p colostomy, recent admission for parainfluenza, PNA. P/w SOB, and elevated BG, not in DKA.    #Acute resp failure due to asthma exacerbation and bronchopneumonia  -on RA   -CT chest improved  -Sputum Cx: Proteus, yeast --- day 3 of Ertapenum and benadryl 50mg prior given hx of skin reaction. started on nystatin suspension   -ID evaluation   -s/p 5d Vanco IV, and aztreonam   -steroids now Methylpred 20iv q12 ,  will need prolonged taper  -nebs, mucomyst, Smart Vest   -appreciate pulm and ID input    #IDDM  -Lantus 50u qHS, ISS  -monitor BG while on steroids    #PCP ppx prevention given chronic steroids  -Bactrim TIW    #Afib  -Eliquis, Mexilitine    #DVT ppx- full a/c w Eliquis    PT eval    Dispo: f/u ID consult regarding sputum Cx, needs prolonged steroid taper outpatient      
Date of service: 23 @ 12:41    pt seen and examined  feels better  has some cough  afebrile     ROS: no fever or chills; denies dizziness, no HA, no abdominal pain, no diarrhea or constipation; no dysuria, no urinary frequency, no legs pain, no rashes    MEDICATIONS  (STANDING):  acetylcysteine 10%  Inhalation 4 milliLiter(s) Inhalation three times a day  albuterol/ipratropium for Nebulization 3 milliLiter(s) Nebulizer every 6 hours  apixaban 5 milliGRAM(s) Oral every 12 hours  atorvastatin 20 milliGRAM(s) Oral at bedtime  aztreonam  IVPB 1000 milliGRAM(s) IV Intermittent every 8 hours  buDESOnide    Inhalation Suspension 0.5 milliGRAM(s) Inhalation two times a day  dextrose 5%. 1000 milliLiter(s) (50 mL/Hr) IV Continuous <Continuous>  dextrose 5%. 1000 milliLiter(s) (100 mL/Hr) IV Continuous <Continuous>  dextrose 50% Injectable 50 milliLiter(s) IV Push every 15 minutes  dextrose 50% Injectable 25 milliLiter(s) IV Push every 15 minutes  dextrose 50% Injectable 12.5 Gram(s) IV Push once  dextrose 50% Injectable 25 Gram(s) IV Push once  dextrose 50% Injectable 25 Gram(s) IV Push once  glucagon  Injectable 1 milliGRAM(s) IntraMuscular once  guaiFENesin  milliGRAM(s) Oral every 12 hours  insulin glargine Injectable (LANTUS) 55 Unit(s) SubCutaneous at bedtime  insulin lispro (ADMELOG) corrective regimen sliding scale   SubCutaneous Before meals and at bedtime  insulin lispro Injectable (ADMELOG) 5 Unit(s) SubCutaneous three times a day before meals  methylPREDNISolone sodium succinate Injectable 20 milliGRAM(s) IV Push every 12 hours  mexiletine 200 milliGRAM(s) Oral every 8 hours  montelukast 10 milliGRAM(s) Oral at bedtime  pantoprazole    Tablet 40 milliGRAM(s) Oral before breakfast  trimethoprim  160 mG/sulfamethoxazole 800 mG 1 Tablet(s) Oral daily  vancomycin  IVPB 1000 milliGRAM(s) IV Intermittent every 12 hours      Vital Signs Last 24 Hrs  T(C): 36.6 (2023 08:00), Max: 36.6 (2023 15:31)  T(F): 97.9 (2023 08:00), Max: 97.9 (2023 08:00)  HR: 71 (2023 08:00) (71 - 91)  BP: 131/63 (2023 08:00) (105/52 - 131/63)  BP(mean): --  RR: 18 (2023 08:00) (18 - 20)  SpO2: 100% (2023 08:00) (98% - 100%)    Parameters below as of 2023 08:00  Patient On (Oxygen Delivery Method): room air    PE:  Constitutional: frail looking  HEENT: NC/AT, EOMI, PERRLA, conjunctivae clear; ears and nose atraumatic; pharynx benign  Neck: supple; thyroid not palpable  Back: no tenderness  Respiratory: decreased breath sounds, rhonchi   Cardiovascular: S1S2 regular, no murmurs  Abdomen: soft, not tender, not distended, positive BS; liver and spleen WNL  Genitourinary: no suprapubic tenderness  Lymphatic: no LN palpable  Musculoskeletal: no muscle tenderness, no joint swelling or tenderness  Extremities: no pedal edema  Neurological/ Psychiatric: moving all extremities  Skin: no rashes; no palpable lesions    Labs: all available labs reviewed                  Vancomycin Level, Trough: 14.7 ug/mL ( @ 21:37)      Urinalysis Basic - ( 2023 03:40 )    Color: Yellow / Appearance: Clear / S.020 / pH: x  Gluc: x / Ketone: Negative  / Bili: Negative / Urobili: Negative   Blood: x / Protein: 15 / Nitrite: Negative   Leuk Esterase: Negative / RBC: 0-2 /HPF / WBC 0-2 /HPF   Sq Epi: x / Non Sq Epi: x / Bacteria: Negative    Culture - Blood (23 @ 04:00)   Specimen Source: .Blood Blood-Peripheral  Culture Results:   No growth to date.    Radiology: all available radiological tests reviewed    ACC: [id number] EXAM:  CT CHEST   ORDERED BY: DELONTE NUGENT     PROCEDURE DATE:  2023          INTERPRETATION:  CLINICAL INFORMATION: Shortness of breath. History of   lung disease.    COMPARISON: 2023.    CONTRAST/COMPLICATIONS:  IV Contrast: NONE  Oral Contrast: NONE  Complications: None reported at time of study completion    PROCEDURE:  CT of the Chest was performed.  Sagittal and coronal reformats were performed.    FINDINGS:    LUNGS AND AIRWAYS: Patent central airways.  Overall improved appearance   of previously noted nodules. Small residual clusters of tree-in-bud type   nodules within the right lung base are appreciated within the right   middle and right lower lobes. There are no confluent airspace opacities.   Linear atelectasis or scarring is seen within the lingula.  PLEURA: No pleural effusion.  MEDIASTINUM AND MARANDA: No lymphadenopathy. A small hiatal hernia.  VESSELS: Ascending aortic prosthetic. Coronary and aortic calcifications   are appreciated. 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 This appearance is stable   compared to previous exam  HEART: Rate valve repair changes are appreciated. The heart size is   normal. Valvular calcifications are noted. No pericardial effusion.  CHEST WALL AND LOWER NECK: Within normal limits.  VISUALIZED UPPER ABDOMEN: Punctate calcified granuloma of the right   hepatic lobe. Calcification of the left kidney may be parenchymal in   nature. Alternatively, this maybe related to complex cyst. If clinically   sonography or contrast enhancement or MRI or CT can further delineation.  Cystic lesions of the pancreatic tail are again noted and are stable.  BONES: Poststernotomy changes. Mottled appearance of the osseous   structures, which may be related to osteopenia pattern, though may also   indicate underlying metabolic or hematologic condition. Correlate   clinically.    IMPRESSION:  Interval improvement of infectious/inflammatory bronchiolitis/bronchitis.   Mild residual clusters of tree in bud type nodularity are seen at the   right middle and right lower lobes inferiorly.    Additional findings as above.        Advanced directives addressed: full resuscitation
Date of service: 23 @ 12:52    pt seen and examined  yesterday felt worse  better today   has cough  afebrile     ROS: no fever or chills; denies dizziness, no HA, no abdominal pain, no diarrhea or constipation; no dysuria, no urinary frequency, no legs pain, no rashes    MEDICATIONS  (STANDING):  acetylcysteine 10%  Inhalation 4 milliLiter(s) Inhalation three times a day  albuterol/ipratropium for Nebulization 3 milliLiter(s) Nebulizer every 6 hours  apixaban 5 milliGRAM(s) Oral every 12 hours  atorvastatin 20 milliGRAM(s) Oral at bedtime  aztreonam  IVPB 1000 milliGRAM(s) IV Intermittent every 8 hours  buDESOnide    Inhalation Suspension 0.5 milliGRAM(s) Inhalation two times a day  dextrose 5%. 1000 milliLiter(s) (50 mL/Hr) IV Continuous <Continuous>  dextrose 5%. 1000 milliLiter(s) (100 mL/Hr) IV Continuous <Continuous>  dextrose 50% Injectable 50 milliLiter(s) IV Push every 15 minutes  dextrose 50% Injectable 25 milliLiter(s) IV Push every 15 minutes  dextrose 50% Injectable 12.5 Gram(s) IV Push once  dextrose 50% Injectable 25 Gram(s) IV Push once  dextrose 50% Injectable 25 Gram(s) IV Push once  glucagon  Injectable 1 milliGRAM(s) IntraMuscular once  guaiFENesin  milliGRAM(s) Oral every 12 hours  insulin glargine Injectable (LANTUS) 55 Unit(s) SubCutaneous at bedtime  insulin lispro (ADMELOG) corrective regimen sliding scale   SubCutaneous Before meals and at bedtime  insulin lispro Injectable (ADMELOG) 5 Unit(s) SubCutaneous three times a day before meals  methylPREDNISolone sodium succinate Injectable 20 milliGRAM(s) IV Push every 12 hours  mexiletine 200 milliGRAM(s) Oral every 8 hours  montelukast 10 milliGRAM(s) Oral at bedtime  pantoprazole    Tablet 40 milliGRAM(s) Oral before breakfast  trimethoprim  160 mG/sulfamethoxazole 800 mG 1 Tablet(s) Oral daily  vancomycin  IVPB 1000 milliGRAM(s) IV Intermittent every 12 hours      Vital Signs Last 24 Hrs  T(C): 36.4 (2023 08:16), Max: 36.5 (2023 16:00)  T(F): 97.6 (2023 08:16), Max: 97.7 (2023 16:00)  HR: 74 (2023 08:16) (74 - 90)  BP: 131/65 (2023 08:16) (114/68 - 131/65)  BP(mean): 82 (2023 16:00) (82 - 82)  RR: 20 (2023 08:16) (20 - 21)  SpO2: 98% (2023 08:16) (95% - 98%)    Parameters below as of 2023 08:16  Patient On (Oxygen Delivery Method): room air    PE:  Constitutional: frail looking  HEENT: NC/AT, EOMI, PERRLA, conjunctivae clear; ears and nose atraumatic; pharynx benign  Neck: supple; thyroid not palpable  Back: no tenderness  Respiratory: decreased breath sounds, rhonchi   Cardiovascular: S1S2 regular, no murmurs  Abdomen: soft, not tender, not distended, positive BS; liver and spleen WNL  Genitourinary: no suprapubic tenderness  Lymphatic: no LN palpable  Musculoskeletal: no muscle tenderness, no joint swelling or tenderness  Extremities: no pedal edema  Neurological/ Psychiatric: moving all extremities  Skin: no rashes; no palpable lesions    Labs: all available labs reviewed                  Vancomycin Level, Trough: 14.7 ug/mL ( @ 21:37)      Urinalysis Basic - ( 2023 03:40 )    Color: Yellow / Appearance: Clear / S.020 / pH: x  Gluc: x / Ketone: Negative  / Bili: Negative / Urobili: Negative   Blood: x / Protein: 15 / Nitrite: Negative   Leuk Esterase: Negative / RBC: 0-2 /HPF / WBC 0-2 /HPF   Sq Epi: x / Non Sq Epi: x / Bacteria: Negative    Culture - Blood (23 @ 04:00)   Specimen Source: .Blood Blood-Peripheral  Culture Results:   No growth to date.    Radiology: all available radiological tests reviewed    ACC: [id number] EXAM:  CT CHEST   ORDERED BY: DELONTE NUGENT     PROCEDURE DATE:  2023          INTERPRETATION:  CLINICAL INFORMATION: Shortness of breath. History of   lung disease.    COMPARISON: 2023.    CONTRAST/COMPLICATIONS:  IV Contrast: NONE  Oral Contrast: NONE  Complications: None reported at time of study completion    PROCEDURE:  CT of the Chest was performed.  Sagittal and coronal reformats were performed.    FINDINGS:    LUNGS AND AIRWAYS: Patent central airways.  Overall improved appearance   of previously noted nodules. Small residual clusters of tree-in-bud type   nodules within the right lung base are appreciated within the right   middle and right lower lobes. There are no confluent airspace opacities.   Linear atelectasis or scarring is seen within the lingula.  PLEURA: No pleural effusion.  MEDIASTINUM AND MARANDA: No lymphadenopathy. A small hiatal hernia.  VESSELS: Ascending aortic prosthetic. Coronary and aortic calcifications   are appreciated. 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 This appearance is stable   compared to previous exam  HEART: Rate valve repair changes are appreciated. The heart size is   normal. Valvular calcifications are noted. No pericardial effusion.  CHEST WALL AND LOWER NECK: Within normal limits.  VISUALIZED UPPER ABDOMEN: Punctate calcified granuloma of the right   hepatic lobe. Calcification of the left kidney may be parenchymal in   nature. Alternatively, this maybe related to complex cyst. If clinically   sonography or contrast enhancement or MRI or CT can further delineation.  Cystic lesions of the pancreatic tail are again noted and are stable.  BONES: Poststernotomy changes. Mottled appearance of the osseous   structures, which may be related to osteopenia pattern, though may also   indicate underlying metabolic or hematologic condition. Correlate   clinically.    IMPRESSION:  Interval improvement of infectious/inflammatory bronchiolitis/bronchitis.   Mild residual clusters of tree in bud type nodularity are seen at the   right middle and right lower lobes inferiorly.    Additional findings as above.        Advanced directives addressed: full resuscitation
Subjective:    Awake, alert. Copious secretions.    MEDICATIONS  (STANDING):  acetylcysteine 10%  Inhalation 4 milliLiter(s) Inhalation three times a day  albuterol/ipratropium for Nebulization 3 milliLiter(s) Nebulizer every 6 hours  apixaban 5 milliGRAM(s) Oral every 12 hours  atorvastatin 20 milliGRAM(s) Oral at bedtime  buDESOnide    Inhalation Suspension 0.5 milliGRAM(s) Inhalation two times a day  dextrose 5%. 1000 milliLiter(s) (100 mL/Hr) IV Continuous <Continuous>  dextrose 5%. 1000 milliLiter(s) (50 mL/Hr) IV Continuous <Continuous>  dextrose 50% Injectable 50 milliLiter(s) IV Push every 15 minutes  dextrose 50% Injectable 25 milliLiter(s) IV Push every 15 minutes  dextrose 50% Injectable 25 Gram(s) IV Push once  dextrose 50% Injectable 25 Gram(s) IV Push once  dextrose 50% Injectable 12.5 Gram(s) IV Push once  glucagon  Injectable 1 milliGRAM(s) IntraMuscular once  guaiFENesin  milliGRAM(s) Oral every 12 hours  insulin glargine Injectable (LANTUS) 50 Unit(s) SubCutaneous at bedtime  insulin lispro (ADMELOG) corrective regimen sliding scale   SubCutaneous Before meals and at bedtime  insulin lispro Injectable (ADMELOG) 5 Unit(s) SubCutaneous three times a day before meals  methylPREDNISolone sodium succinate Injectable 20 milliGRAM(s) IV Push every 12 hours  mexiletine 200 milliGRAM(s) Oral every 8 hours  montelukast 10 milliGRAM(s) Oral at bedtime  pantoprazole    Tablet 40 milliGRAM(s) Oral before breakfast  trimethoprim  160 mG/sulfamethoxazole 800 mG 1 Tablet(s) Oral daily    MEDICATIONS  (PRN):  albuterol    90 MICROgram(s) HFA Inhaler 2 Puff(s) Inhalation every 4 hours PRN Shortness of Breath and/or Wheezing  dextrose Oral Gel 15 Gram(s) Oral once PRN Blood Glucose LESS THAN 70 milliGRAM(s)/deciliter  ondansetron   Disintegrating Tablet 4 milliGRAM(s) Oral every 6 hours PRN Nausea  polyethylene glycol 3350 17 Gram(s) Oral daily PRN Constipation      Allergies    Dilaudid (Short breath)  tetanus toxoid (Short breath)  cefepime (Anaphylaxis)  Valium (Short breath)  Avelox (Short breath; Pruritus)  iodine (Short breath; Swelling)  penicillin (Anaphylaxis)  codeine (Short breath)  shellfish (Anaphylaxis)  aspirin (Short breath)    Intolerances        Vital Signs Last 24 Hrs  T(C): 36.2 (12 Jun 2023 08:29), Max: 37.2 (11 Jun 2023 16:33)  T(F): 97.2 (12 Jun 2023 08:29), Max: 98.9 (11 Jun 2023 16:33)  HR: 80 (12 Jun 2023 08:29) (67 - 92)  BP: 105/52 (12 Jun 2023 08:29) (105/52 - 155/71)  BP(mean): --  RR: 18 (12 Jun 2023 08:29) (18 - 18)  SpO2: 99% (12 Jun 2023 08:29) (96% - 99%)    Parameters below as of 12 Jun 2023 08:29  Patient On (Oxygen Delivery Method): room air        PHYSICAL EXAMINATION:    NECK:  Supple. No lymphadenopathy. Jugular venous pressure not elevated.   HEART:   The cardiac impulse has a normal quality. Reg., Nl S1 and S2.   CHEST:  Chest with scattered wheezes/rhonchi. Normal respiratory effort.  ABDOMEN:  Soft and nontender.   EXTREMITIES:  There is no edema.       LABS:                        11.2   15.01 )-----------( 192      ( 12 Jun 2023 06:53 )             37.5     06-12    141  |  108  |  25<H>  ----------------------------<  189<H>  4.4   |  28  |  0.70    Ca    8.9      12 Jun 2023 06:53      Sputum-Proteus/Nl. Jessica      RADIOLOGY & ADDITIONAL TESTS:    Assessment and Plan:   	  - Off Abx. Proteus noted-await ID re-evaluation. Bactrim added  for PCP PPx  - IV solumedrol to be continued @ 20MG Q12H. Will switch to orals when closer to D/C  - Aerosols with Duoneb/Budesonide  - Mucomyst  - Chest PT  - Smart Vest for use BID  - Mucinex  - OOB to chair  - Monitor blood sugars  - Repeat CXR today    Problem/Recommendation - 1:  ·  Acute hypoxemic respiratory failure.   Problem/Recommendation - 2:  ·  Asthma, severe.   Problem/Recommendation - 3:  ·  Bronchiectasis.   Problem/Recommendation - 4:  ·  Tracheobronchomalacia.   Problem/Recommendation - 5:  ·  Dyspnea. 
Subjective:    Awake, alert. Feels better today. Less cough and congestion.    MEDICATIONS  (STANDING):  acetylcysteine 10%  Inhalation 4 milliLiter(s) Inhalation three times a day  albuterol/ipratropium for Nebulization 3 milliLiter(s) Nebulizer every 6 hours  apixaban 5 milliGRAM(s) Oral every 12 hours  atorvastatin 20 milliGRAM(s) Oral at bedtime  buDESOnide    Inhalation Suspension 0.5 milliGRAM(s) Inhalation two times a day  dextrose 5%. 1000 milliLiter(s) (50 mL/Hr) IV Continuous <Continuous>  dextrose 5%. 1000 milliLiter(s) (100 mL/Hr) IV Continuous <Continuous>  dextrose 50% Injectable 50 milliLiter(s) IV Push every 15 minutes  dextrose 50% Injectable 25 milliLiter(s) IV Push every 15 minutes  dextrose 50% Injectable 12.5 Gram(s) IV Push once  dextrose 50% Injectable 25 Gram(s) IV Push once  dextrose 50% Injectable 25 Gram(s) IV Push once  ertapenem  IVPB 1000 milliGRAM(s) IV Intermittent every 24 hours  glucagon  Injectable 1 milliGRAM(s) IntraMuscular once  guaiFENesin  milliGRAM(s) Oral every 12 hours  insulin glargine Injectable (LANTUS) 50 Unit(s) SubCutaneous at bedtime  insulin lispro (ADMELOG) corrective regimen sliding scale   SubCutaneous Before meals and at bedtime  insulin lispro Injectable (ADMELOG) 5 Unit(s) SubCutaneous three times a day before meals  methylPREDNISolone sodium succinate Injectable 20 milliGRAM(s) IV Push every 12 hours  mexiletine 200 milliGRAM(s) Oral every 8 hours  montelukast 10 milliGRAM(s) Oral at bedtime  nystatin    Suspension 535421 Unit(s) Oral four times a day  pantoprazole    Tablet 40 milliGRAM(s) Oral before breakfast  trimethoprim  160 mG/sulfamethoxazole 800 mG 1 Tablet(s) Oral daily    MEDICATIONS  (PRN):  albuterol    90 MICROgram(s) HFA Inhaler 2 Puff(s) Inhalation every 4 hours PRN Shortness of Breath and/or Wheezing  dextrose Oral Gel 15 Gram(s) Oral once PRN Blood Glucose LESS THAN 70 milliGRAM(s)/deciliter  diphenhydrAMINE 50 milliGRAM(s) Oral every 6 hours PRN Rash and/or Itching  ondansetron   Disintegrating Tablet 4 milliGRAM(s) Oral every 6 hours PRN Nausea  polyethylene glycol 3350 17 Gram(s) Oral daily PRN Constipation      Allergies    Dilaudid (Short breath)  tetanus toxoid (Short breath)  cefepime (Anaphylaxis)  Valium (Short breath)  Avelox (Short breath; Pruritus)  iodine (Short breath; Swelling)  penicillin (Anaphylaxis)  codeine (Short breath)  shellfish (Anaphylaxis)  aspirin (Short breath)    Intolerances        Vital Signs Last 24 Hrs  T(C): 36.4 (13 Jun 2023 07:16), Max: 36.8 (12 Jun 2023 16:00)  T(F): 97.6 (13 Jun 2023 07:16), Max: 98.3 (12 Jun 2023 16:00)  HR: 86 (13 Jun 2023 07:16) (79 - 98)  BP: 121/60 (13 Jun 2023 07:16) (107/76 - 123/51)  BP(mean): --  RR: 18 (13 Jun 2023 07:16) (18 - 18)  SpO2: 96% (13 Jun 2023 07:16) (96% - 100%)    Parameters below as of 13 Jun 2023 07:16  Patient On (Oxygen Delivery Method): room air        PHYSICAL EXAMINATION:    NECK:  Supple. No lymphadenopathy. Jugular venous pressure not elevated.   HEART:   The cardiac impulse has a normal quality. Reg., Nl S1 and S2.    CHEST:  Chest with scattered wheezes and rhonchi, but decreased c/w yesterday. Normal respiratory effort.  ABDOMEN:  Soft and nontender.   EXTREMITIES:  There is tr edema.       LABS:                        11.2   15.01 )-----------( 192      ( 12 Jun 2023 06:53 )             37.5     06-12    141  |  108  |  25<H>  ----------------------------<  189<H>  4.4   |  28  |  0.70    Ca    8.9      12 Jun 2023 06:53            RADIOLOGY & ADDITIONAL TESTS: CXR (6/12)-increased markings, no focal infiltrate    Assessment and Plan:   	  - Ertapenem started for Proteus. Need to discuss length of Tx w/ ID. Bactrim for PCP PPx  - IV solumedrol to be continued @ 20MG Q12H. Will switch to orals when closer to D/C  - Aerosols with Duoneb/Budesonide  - Mucomyst  - Chest PT  - Smart Vest for use BID  - Mucinex  - OOB to chair  - Monitor blood sugars  - Repeat CXR noted    Problem/Recommendation - 1:  ·  Acute hypoxemic respiratory failure.   Problem/Recommendation - 2:  ·  Asthma, severe.   Problem/Recommendation - 3:  ·  Bronchiectasis.   Problem/Recommendation - 4:  ·  Tracheobronchomalacia.   Problem/Recommendation - 5:  ·  Dyspnea. 
Subjective:    Awake, alert. Sl. improvement in cough. +green sputum    MEDICATIONS  (STANDING):  acetylcysteine 10%  Inhalation 4 milliLiter(s) Inhalation three times a day  albuterol/ipratropium for Nebulization 3 milliLiter(s) Nebulizer every 6 hours  apixaban 5 milliGRAM(s) Oral every 12 hours  atorvastatin 20 milliGRAM(s) Oral at bedtime  aztreonam  IVPB 1000 milliGRAM(s) IV Intermittent every 8 hours  buDESOnide    Inhalation Suspension 0.5 milliGRAM(s) Inhalation two times a day  dextrose 5%. 1000 milliLiter(s) (50 mL/Hr) IV Continuous <Continuous>  dextrose 5%. 1000 milliLiter(s) (100 mL/Hr) IV Continuous <Continuous>  dextrose 50% Injectable 50 milliLiter(s) IV Push every 15 minutes  dextrose 50% Injectable 25 milliLiter(s) IV Push every 15 minutes  dextrose 50% Injectable 12.5 Gram(s) IV Push once  dextrose 50% Injectable 25 Gram(s) IV Push once  dextrose 50% Injectable 25 Gram(s) IV Push once  glucagon  Injectable 1 milliGRAM(s) IntraMuscular once  guaiFENesin  milliGRAM(s) Oral every 12 hours  insulin glargine Injectable (LANTUS) 45 Unit(s) SubCutaneous at bedtime  insulin lispro (ADMELOG) corrective regimen sliding scale   SubCutaneous Before meals and at bedtime  insulin lispro Injectable (ADMELOG) 5 Unit(s) SubCutaneous three times a day before meals  lactated ringers. 1000 milliLiter(s) (100 mL/Hr) IV Continuous <Continuous>  methylPREDNISolone sodium succinate Injectable 40 milliGRAM(s) IV Push three times a day  mexiletine 200 milliGRAM(s) Oral every 8 hours  montelukast 10 milliGRAM(s) Oral at bedtime  pantoprazole    Tablet 40 milliGRAM(s) Oral before breakfast  sodium chloride 0.45%. 1000 milliLiter(s) (1000 mL/Hr) IV Continuous <Continuous>  vancomycin  IVPB 1000 milliGRAM(s) IV Intermittent every 12 hours    MEDICATIONS  (PRN):  albuterol    90 MICROgram(s) HFA Inhaler 2 Puff(s) Inhalation every 4 hours PRN Shortness of Breath and/or Wheezing  dextrose Oral Gel 15 Gram(s) Oral once PRN Blood Glucose LESS THAN 70 milliGRAM(s)/deciliter  ondansetron   Disintegrating Tablet 4 milliGRAM(s) Oral every 6 hours PRN Nausea      Allergies    Dilaudid (Short breath)  tetanus toxoid (Short breath)  cefepime (Anaphylaxis)  Valium (Short breath)  Avelox (Short breath; Pruritus)  iodine (Short breath; Swelling)  penicillin (Anaphylaxis)  codeine (Short breath)  shellfish (Anaphylaxis)  aspirin (Short breath)    Intolerances        Vital Signs Last 24 Hrs  T(C): 37.4 (2023 04:00), Max: 37.4 (2023 04:00)  T(F): 99.4 (2023 04:00), Max: 99.4 (2023 04:00)  HR: 67 (2023 08:00) (62 - 94)  BP: 154/83 (2023 08:00) (90/78 - 168/72)  BP(mean): 103 (2023 08:00) (77 - 103)  RR: 17 (2023 08:00) (16 - 30)  SpO2: 98% (2023 08:00) (94% - 100%)    Parameters below as of 2023 08:00  Patient On (Oxygen Delivery Method): room air        PHYSICAL EXAMINATION:    NECK:  Supple. No lymphadenopathy. Jugular venous pressure not elevated.   HEART:   The cardiac impulse has a normal quality. Reg., Nl S1 and S2.    CHEST:  Chest with diffuse insp./exp. wheezes/rhonchi. Increased respiratory effort.  ABDOMEN:  Soft and nontender.   EXTREMITIES:  There is no edema.       LABS:                        11.0   11.23 )-----------( 142      ( 2023 05:35 )             36.1     06-06    142  |  110<H>  |  23  ----------------------------<  243<H>  4.4   |  28  |  0.59    Ca    8.4<L>      2023 05:35  Phos  2.6     06-06  Mg     2.1     06-06    TPro  6.7  /  Alb  3.2<L>  /  TBili  0.3  /  DBili  x   /  AST  19  /  ALT  25  /  AlkPhos  114  06-05      Urinalysis Basic - ( 2023 03:40 )    Color: Yellow / Appearance: Clear / S.020 / pH: x  Gluc: x / Ketone: Negative  / Bili: Negative / Urobili: Negative   Blood: x / Protein: 15 / Nitrite: Negative   Leuk Esterase: Negative / RBC: 0-2 /HPF / WBC 0-2 /HPF   Sq Epi: x / Non Sq Epi: x / Bacteria: Negative        RADIOLOGY & ADDITIONAL TESTS:    Assessment/Plan:    - Insulin Drip D/C'ed  - Broad spectrum Abx-Vanco/Aztreonam- per ID  - IV solumedrol-maintain @ 40MG Q8H  - Aerosols with Duoneb/Budesonide  - Mucomyst  - Chest PT  - Patient to bring in Smart Vest for use BID  - Mucinex  - OOB to chair    Problem/Recommendation - 1:  ·  Acute hypoxemic respiratory failure.   Problem/Recommendation - 2:  ·  Asthma, severe.   Problem/Recommendation - 3:  ·  Bronchiectasis.   Problem/Recommendation - 4:  ·  Tracheobronchomalacia.   Problem/Recommendation - 5:  ·  Dyspnea.   Problem/Recommendation - 6:  ·  Diabetic ketoacidosis. 
Subjective:    Awake, alert. Sputum slightly diminished in quantity. Lighter in color.    MEDICATIONS  (STANDING):  acetylcysteine 10%  Inhalation 4 milliLiter(s) Inhalation three times a day  albuterol/ipratropium for Nebulization 3 milliLiter(s) Nebulizer every 6 hours  apixaban 5 milliGRAM(s) Oral every 12 hours  atorvastatin 20 milliGRAM(s) Oral at bedtime  buDESOnide    Inhalation Suspension 0.5 milliGRAM(s) Inhalation two times a day  dextrose 5%. 1000 milliLiter(s) (50 mL/Hr) IV Continuous <Continuous>  dextrose 5%. 1000 milliLiter(s) (100 mL/Hr) IV Continuous <Continuous>  dextrose 50% Injectable 25 Gram(s) IV Push once  dextrose 50% Injectable 50 milliLiter(s) IV Push every 15 minutes  dextrose 50% Injectable 25 milliLiter(s) IV Push every 15 minutes  dextrose 50% Injectable 25 Gram(s) IV Push once  dextrose 50% Injectable 12.5 Gram(s) IV Push once  ertapenem  IVPB 1000 milliGRAM(s) IV Intermittent every 24 hours  glucagon  Injectable 1 milliGRAM(s) IntraMuscular once  guaiFENesin  milliGRAM(s) Oral every 12 hours  insulin glargine Injectable (LANTUS) 50 Unit(s) SubCutaneous at bedtime  insulin lispro (ADMELOG) corrective regimen sliding scale   SubCutaneous Before meals and at bedtime  insulin lispro Injectable (ADMELOG) 5 Unit(s) SubCutaneous three times a day before meals  methylPREDNISolone sodium succinate Injectable 20 milliGRAM(s) IV Push every 12 hours  mexiletine 200 milliGRAM(s) Oral every 8 hours  montelukast 10 milliGRAM(s) Oral at bedtime  nystatin    Suspension 255199 Unit(s) Oral four times a day  pantoprazole    Tablet 40 milliGRAM(s) Oral before breakfast  trimethoprim  160 mG/sulfamethoxazole 800 mG 1 Tablet(s) Oral daily    MEDICATIONS  (PRN):  albuterol    90 MICROgram(s) HFA Inhaler 2 Puff(s) Inhalation every 4 hours PRN Shortness of Breath and/or Wheezing  dextrose Oral Gel 15 Gram(s) Oral once PRN Blood Glucose LESS THAN 70 milliGRAM(s)/deciliter  diphenhydrAMINE 50 milliGRAM(s) Oral every 6 hours PRN Rash and/or Itching  ondansetron   Disintegrating Tablet 4 milliGRAM(s) Oral every 6 hours PRN Nausea  polyethylene glycol 3350 17 Gram(s) Oral daily PRN Constipation      Allergies    Dilaudid (Short breath)  tetanus toxoid (Short breath)  cefepime (Anaphylaxis)  Valium (Short breath)  Avelox (Short breath; Pruritus)  iodine (Short breath; Swelling)  penicillin (Anaphylaxis)  codeine (Short breath)  shellfish (Anaphylaxis)  aspirin (Short breath)    Intolerances        Vital Signs Last 24 Hrs  T(C): 36.3 (16 Jun 2023 07:36), Max: 36.8 (15 Christopher 2023 15:55)  T(F): 97.3 (16 Jun 2023 07:36), Max: 98.3 (15 Christopher 2023 15:55)  HR: 77 (16 Jun 2023 07:36) (76 - 88)  BP: 103/54 (16 Jun 2023 07:36) (103/54 - 135/49)  BP(mean): --  RR: 18 (16 Jun 2023 07:36) (18 - 18)  SpO2: 100% (16 Jun 2023 07:36) (95% - 100%)    Parameters below as of 16 Jun 2023 07:36  Patient On (Oxygen Delivery Method): room air        PHYSICAL EXAMINATION:    NECK:  Supple. No lymphadenopathy. Jugular venous pressure not elevated.   HEART:   The cardiac impulse has a normal quality. Reg., Nl S1 and S2.   CHEST:  Chest with scattered wheezes/rhonchi. Increased respiratory effort.  ABDOMEN:  Soft and nontender.   EXTREMITIES:  There is no edema.       LABS:                        12.3   21.51 )-----------( 239      ( 15 Christopher 2023 08:37 )             41.8                 RADIOLOGY & ADDITIONAL TESTS:    Assessment and Plan:   	  - On Ertapenem for Proteus-day #5. Will be treated for 14 days total. Midline catheter to be placed today.  - IV solumedrol to be continued @ 20MG Q12H. Will switch to Medrol 24MG QD at time of D/C  - Aerosols with Duoneb/Budesonide  - Mucomyst  - Chest PT  - Smart Vest for use BID  - Mucinex  - OOB to chair  - Patient will F/U with her pulmonologist, Dr. Allison, after D/C      Problem/Recommendation - 1:  ·  Acute hypoxemic respiratory failure.   Problem/Recommendation - 2:  ·  Asthma, severe.   Problem/Recommendation - 3:  ·  Bronchiectasis.   Problem/Recommendation - 4:  ·  Tracheobronchomalacia.   Problem/Recommendation - 5:  ·  Dyspnea. 
Subjective:    Awake, alert. Status quo. Still with cough.    6/10: alert, awake, no distress, has cough, yellow sputum.  Says feels some better.     MEDICATIONS  (STANDING):  acetylcysteine 10%  Inhalation 4 milliLiter(s) Inhalation three times a day  albuterol/ipratropium for Nebulization 3 milliLiter(s) Nebulizer every 6 hours  apixaban 5 milliGRAM(s) Oral every 12 hours  atorvastatin 20 milliGRAM(s) Oral at bedtime  aztreonam  IVPB 1000 milliGRAM(s) IV Intermittent every 8 hours  buDESOnide    Inhalation Suspension 0.5 milliGRAM(s) Inhalation two times a day  dextrose 5%. 1000 milliLiter(s) (50 mL/Hr) IV Continuous <Continuous>  dextrose 5%. 1000 milliLiter(s) (100 mL/Hr) IV Continuous <Continuous>  dextrose 50% Injectable 50 milliLiter(s) IV Push every 15 minutes  dextrose 50% Injectable 25 milliLiter(s) IV Push every 15 minutes  dextrose 50% Injectable 12.5 Gram(s) IV Push once  dextrose 50% Injectable 25 Gram(s) IV Push once  dextrose 50% Injectable 25 Gram(s) IV Push once  glucagon  Injectable 1 milliGRAM(s) IntraMuscular once  guaiFENesin  milliGRAM(s) Oral every 12 hours  insulin glargine Injectable (LANTUS) 55 Unit(s) SubCutaneous at bedtime  insulin lispro (ADMELOG) corrective regimen sliding scale   SubCutaneous Before meals and at bedtime  insulin lispro Injectable (ADMELOG) 5 Unit(s) SubCutaneous three times a day before meals  methylPREDNISolone sodium succinate Injectable 20 milliGRAM(s) IV Push every 12 hours  mexiletine 200 milliGRAM(s) Oral every 8 hours  montelukast 10 milliGRAM(s) Oral at bedtime  pantoprazole    Tablet 40 milliGRAM(s) Oral before breakfast  trimethoprim  160 mG/sulfamethoxazole 800 mG 1 Tablet(s) Oral daily  vancomycin  IVPB 1000 milliGRAM(s) IV Intermittent every 12 hours    MEDICATIONS  (PRN):  albuterol    90 MICROgram(s) HFA Inhaler 2 Puff(s) Inhalation every 4 hours PRN Shortness of Breath and/or Wheezing  dextrose Oral Gel 15 Gram(s) Oral once PRN Blood Glucose LESS THAN 70 milliGRAM(s)/deciliter  ondansetron   Disintegrating Tablet 4 milliGRAM(s) Oral every 6 hours PRN Nausea  polyethylene glycol 3350 17 Gram(s) Oral daily PRN Constipation      Allergies    Dilaudid (Short breath)  tetanus toxoid (Short breath)  cefepime (Anaphylaxis)  Valium (Short breath)  Avelox (Short breath; Pruritus)  iodine (Short breath; Swelling)  penicillin (Anaphylaxis)  codeine (Short breath)  shellfish (Anaphylaxis)  aspirin (Short breath)    Intolerances        Vital Signs Last 24 Hrs  T(C): 36.6 (09 Jun 2023 08:00), Max: 36.6 (08 Jun 2023 15:31)  T(F): 97.9 (09 Jun 2023 08:00), Max: 97.9 (09 Jun 2023 08:00)  HR: 71 (09 Jun 2023 08:00) (71 - 91)  BP: 131/63 (09 Jun 2023 08:00) (105/52 - 131/63)  BP(mean): --  RR: 18 (09 Jun 2023 08:00) (18 - 20)  SpO2: 100% (09 Jun 2023 08:00) (98% - 100%)    Parameters below as of 09 Jun 2023 08:00  Patient On (Oxygen Delivery Method): room air        PHYSICAL EXAMINATION:    NECK:  Supple. No lymphadenopathy. Jugular venous pressure not elevated.   HEART:   The cardiac impulse has a normal quality. Reg., Nl S1 and S2.  CHEST:  Chest with scattered rhonchi and exp. wheezes. Increased respiratory effort.  ABDOMEN:  Soft and nontender.   EXTREMITIES:  There is no edema.       LABS:                RADIOLOGY & ADDITIONAL TESTS:    Assessment and Plan:   	  - Broad spectrum Abx-Vanco/Aztreonam- per ID. Bactrim added  for PCP PPx    - IV solumedrol to be continued @ 20MG Q12H due to persistent bronchospasm and chest tightness  - Aerosols with Duoneb/Budesonide  - Mucomyst  - Chest PT  - To resume Smart Vest for use BID today  - Mucinex  - OOB to chair  - Sputum Cx-follow up on results.  - Monitor blood sugars    Problem/Recommendation - 1:  ·  Acute hypoxemic respiratory failure.   Problem/Recommendation - 2:  ·  Asthma, severe.   Problem/Recommendation - 3:  ·  Bronchiectasis.   Problem/Recommendation - 4:  ·  Tracheobronchomalacia.   Problem/Recommendation - 5:  ·  Dyspnea. 
Subjective:    Awake, alert. Status quo. Still with cough.    6/10: alert, awake, no distress, has cough, yellow sputum.  Says feels some better.   6/11: alert, in chair, on RA, no distress. has cough, yellow sputum.     MEDICATIONS  (STANDING):  acetylcysteine 10%  Inhalation 4 milliLiter(s) Inhalation three times a day  albuterol/ipratropium for Nebulization 3 milliLiter(s) Nebulizer every 6 hours  apixaban 5 milliGRAM(s) Oral every 12 hours  atorvastatin 20 milliGRAM(s) Oral at bedtime  aztreonam  IVPB 1000 milliGRAM(s) IV Intermittent every 8 hours  buDESOnide    Inhalation Suspension 0.5 milliGRAM(s) Inhalation two times a day  dextrose 5%. 1000 milliLiter(s) (50 mL/Hr) IV Continuous <Continuous>  dextrose 5%. 1000 milliLiter(s) (100 mL/Hr) IV Continuous <Continuous>  dextrose 50% Injectable 50 milliLiter(s) IV Push every 15 minutes  dextrose 50% Injectable 25 milliLiter(s) IV Push every 15 minutes  dextrose 50% Injectable 12.5 Gram(s) IV Push once  dextrose 50% Injectable 25 Gram(s) IV Push once  dextrose 50% Injectable 25 Gram(s) IV Push once  glucagon  Injectable 1 milliGRAM(s) IntraMuscular once  guaiFENesin  milliGRAM(s) Oral every 12 hours  insulin glargine Injectable (LANTUS) 55 Unit(s) SubCutaneous at bedtime  insulin lispro (ADMELOG) corrective regimen sliding scale   SubCutaneous Before meals and at bedtime  insulin lispro Injectable (ADMELOG) 5 Unit(s) SubCutaneous three times a day before meals  methylPREDNISolone sodium succinate Injectable 20 milliGRAM(s) IV Push every 12 hours  mexiletine 200 milliGRAM(s) Oral every 8 hours  montelukast 10 milliGRAM(s) Oral at bedtime  pantoprazole    Tablet 40 milliGRAM(s) Oral before breakfast  trimethoprim  160 mG/sulfamethoxazole 800 mG 1 Tablet(s) Oral daily  vancomycin  IVPB 1000 milliGRAM(s) IV Intermittent every 12 hours    MEDICATIONS  (PRN):  albuterol    90 MICROgram(s) HFA Inhaler 2 Puff(s) Inhalation every 4 hours PRN Shortness of Breath and/or Wheezing  dextrose Oral Gel 15 Gram(s) Oral once PRN Blood Glucose LESS THAN 70 milliGRAM(s)/deciliter  ondansetron   Disintegrating Tablet 4 milliGRAM(s) Oral every 6 hours PRN Nausea  polyethylene glycol 3350 17 Gram(s) Oral daily PRN Constipation      Allergies    Dilaudid (Short breath)  tetanus toxoid (Short breath)  cefepime (Anaphylaxis)  Valium (Short breath)  Avelox (Short breath; Pruritus)  iodine (Short breath; Swelling)  penicillin (Anaphylaxis)  codeine (Short breath)  shellfish (Anaphylaxis)  aspirin (Short breath)    Intolerances        Vital Signs Last 24 Hrs  T(C): 36.6 (09 Jun 2023 08:00), Max: 36.6 (08 Jun 2023 15:31)  T(F): 97.9 (09 Jun 2023 08:00), Max: 97.9 (09 Jun 2023 08:00)  HR: 71 (09 Jun 2023 08:00) (71 - 91)  BP: 131/63 (09 Jun 2023 08:00) (105/52 - 131/63)  BP(mean): --  RR: 18 (09 Jun 2023 08:00) (18 - 20)  SpO2: 100% (09 Jun 2023 08:00) (98% - 100%)    Parameters below as of 09 Jun 2023 08:00  Patient On (Oxygen Delivery Method): room air        PHYSICAL EXAMINATION:    NECK:  Supple. No lymphadenopathy. Jugular venous pressure not elevated.   HEART:   The cardiac impulse has a normal quality. Reg., Nl S1 and S2.  CHEST:  Chest with rhonchi and soft low pitched exp. wheezes.   ABDOMEN:  Soft and nontender.   EXTREMITIES:  There is no edema.       LABS:                RADIOLOGY & ADDITIONAL TESTS:    Assessment and Plan:   	  - Broad spectrum Abx-Vanco/Aztreonam- per ID. Bactrim added  for PCP PPx    - IV solumedrol to be continued @ 20MG Q12H due to persistent bronchospasm and chest tightness  - Aerosols with Duoneb/Budesonide  - Mucomyst  - Chest PT  - Smart Vest for use BID today  - Mucinex  - OOB to chair  - Sputum Cx- nl resp val, proteus m.  ID following, sensitivities pending.   - Monitor blood sugars    Problem/Recommendation - 1:  ·  Acute hypoxemic respiratory failure.   Problem/Recommendation - 2:  ·  Asthma, severe.   Problem/Recommendation - 3:  ·  Bronchiectasis.   Problem/Recommendation - 4:  ·  Tracheobronchomalacia.   Problem/Recommendation - 5:  ·  Dyspnea. 
Subjective:    Awake, alert. Status quo. Still with cough.    MEDICATIONS  (STANDING):  acetylcysteine 10%  Inhalation 4 milliLiter(s) Inhalation three times a day  albuterol/ipratropium for Nebulization 3 milliLiter(s) Nebulizer every 6 hours  apixaban 5 milliGRAM(s) Oral every 12 hours  atorvastatin 20 milliGRAM(s) Oral at bedtime  aztreonam  IVPB 1000 milliGRAM(s) IV Intermittent every 8 hours  buDESOnide    Inhalation Suspension 0.5 milliGRAM(s) Inhalation two times a day  dextrose 5%. 1000 milliLiter(s) (50 mL/Hr) IV Continuous <Continuous>  dextrose 5%. 1000 milliLiter(s) (100 mL/Hr) IV Continuous <Continuous>  dextrose 50% Injectable 50 milliLiter(s) IV Push every 15 minutes  dextrose 50% Injectable 25 milliLiter(s) IV Push every 15 minutes  dextrose 50% Injectable 12.5 Gram(s) IV Push once  dextrose 50% Injectable 25 Gram(s) IV Push once  dextrose 50% Injectable 25 Gram(s) IV Push once  glucagon  Injectable 1 milliGRAM(s) IntraMuscular once  guaiFENesin  milliGRAM(s) Oral every 12 hours  insulin glargine Injectable (LANTUS) 55 Unit(s) SubCutaneous at bedtime  insulin lispro (ADMELOG) corrective regimen sliding scale   SubCutaneous Before meals and at bedtime  insulin lispro Injectable (ADMELOG) 5 Unit(s) SubCutaneous three times a day before meals  methylPREDNISolone sodium succinate Injectable 20 milliGRAM(s) IV Push every 12 hours  mexiletine 200 milliGRAM(s) Oral every 8 hours  montelukast 10 milliGRAM(s) Oral at bedtime  pantoprazole    Tablet 40 milliGRAM(s) Oral before breakfast  trimethoprim  160 mG/sulfamethoxazole 800 mG 1 Tablet(s) Oral daily  vancomycin  IVPB 1000 milliGRAM(s) IV Intermittent every 12 hours    MEDICATIONS  (PRN):  albuterol    90 MICROgram(s) HFA Inhaler 2 Puff(s) Inhalation every 4 hours PRN Shortness of Breath and/or Wheezing  dextrose Oral Gel 15 Gram(s) Oral once PRN Blood Glucose LESS THAN 70 milliGRAM(s)/deciliter  ondansetron   Disintegrating Tablet 4 milliGRAM(s) Oral every 6 hours PRN Nausea  polyethylene glycol 3350 17 Gram(s) Oral daily PRN Constipation      Allergies    Dilaudid (Short breath)  tetanus toxoid (Short breath)  cefepime (Anaphylaxis)  Valium (Short breath)  Avelox (Short breath; Pruritus)  iodine (Short breath; Swelling)  penicillin (Anaphylaxis)  codeine (Short breath)  shellfish (Anaphylaxis)  aspirin (Short breath)    Intolerances        Vital Signs Last 24 Hrs  T(C): 36.6 (09 Jun 2023 08:00), Max: 36.6 (08 Jun 2023 15:31)  T(F): 97.9 (09 Jun 2023 08:00), Max: 97.9 (09 Jun 2023 08:00)  HR: 71 (09 Jun 2023 08:00) (71 - 91)  BP: 131/63 (09 Jun 2023 08:00) (105/52 - 131/63)  BP(mean): --  RR: 18 (09 Jun 2023 08:00) (18 - 20)  SpO2: 100% (09 Jun 2023 08:00) (98% - 100%)    Parameters below as of 09 Jun 2023 08:00  Patient On (Oxygen Delivery Method): room air        PHYSICAL EXAMINATION:    NECK:  Supple. No lymphadenopathy. Jugular venous pressure not elevated.   HEART:   The cardiac impulse has a normal quality. Reg., Nl S1 and S2.  CHEST:  Chest with scattered rhonchi and exp. wheezes. Increased respiratory effort.  ABDOMEN:  Soft and nontender.   EXTREMITIES:  There is no edema.       LABS:                RADIOLOGY & ADDITIONAL TESTS:    Assessment and Plan:   	  - Broad spectrum Abx-Vanco/Aztreonam- per ID. Bactrim added  for PCP PPx    - IV solumedrol to be continued @ 20MG Q12H due to persistent bronchospasm and chest tightness  - Aerosols with Duoneb/Budesonide  - Mucomyst  - Chest PT  - To resume Smart Vest for use BID today  - Mucinex  - OOB to chair  - Send Sputum Cx-not done several days ago, when previously ordered  - Monitor blood sugars    Problem/Recommendation - 1:  ·  Acute hypoxemic respiratory failure.   Problem/Recommendation - 2:  ·  Asthma, severe.   Problem/Recommendation - 3:  ·  Bronchiectasis.   Problem/Recommendation - 4:  ·  Tracheobronchomalacia.   Problem/Recommendation - 5:  ·  Dyspnea. 
72F hx/o severe persistent asthma on chronic steroids, previously in ICU,  DM, pAfib on Eliquis, colorectal ca yrs ago s/p colostomy, recent admission for parainfluenza, PNA. P/w SOB, found to have bronchopna and asthma exacerbation     Additional results/Imaging, I have personally reviewed:  CT chest noncon 6/4/23: Interval improvement of infectious/inflammatory bronchiolitis/bronchitis. Mild residual clusters of tree in bud type nodularity are seen at the right middle and right lower lobes inferiorly.    CXR 6/4/23: No acute pulmonary disease.    6.10: +deep productive coughing, gray sputum, +KRAMER, no wheezing   6.11: deep coughing , no wheezing, no fever/chills, +KRAMER  6/12: sitting up in bed, still with productive congested cough. and dyspnea on minimal exertion.   6/13: felt tremulous today, concerned about low blood sugar  (tested to be 200). breathing improving. tolerated abx overnight.         REVIEW OF SYSTEMS:    CONSTITUTIONAL: No weakness, No fevers or chills  ENT: No ear ache, No sorethroat  NECK: No pain, No stiffness  RESPIRATORY: +deep cough, No wheezing, No hemoptysis; ++ dyspnea slowly improving   CARDIOVASCULAR: No chest pain, No palpitations  GASTROINTESTINAL: No abd pain, No nausea, No vomiting, No hematemesis, No diarrhea or constipation. No melena, No hematochezia.  GENITOURINARY: No dysuria, No  hematuria  NEUROLOGICAL: No diplopia, No paresthesia, No motor dysfunction  MUSCULOSKELETAL: No arthralgia, No myalgia  SKIN: No rashes, or lesions   PSYCH: no anxiety, no suicidal ideation    All other review of systems is negative unless indicated above    Vital Signs Last 24 Hrs  T(C): 36.4 (13 Jun 2023 07:16), Max: 36.8 (12 Jun 2023 16:00)  T(F): 97.6 (13 Jun 2023 07:16), Max: 98.3 (12 Jun 2023 16:00)  HR: 86 (13 Jun 2023 07:16) (79 - 98)  BP: 121/60 (13 Jun 2023 07:16) (107/76 - 123/51)  BP(mean): --  RR: 18 (13 Jun 2023 07:16) (18 - 18)  SpO2: 96% (13 Jun 2023 07:16) (96% - 100%)    Parameters below as of 13 Jun 2023 07:16  Patient On (Oxygen Delivery Method): room air        PHYSICAL EXAM:    GENERAL: NAD  HEENT:  NC/AT, EOMI, PERRLA, No scleral icterus, Moist mucous membranes. exophthalmos to left eye   NECK: Supple, No JVD  CNS:  Alert & Oriented X3, Motor Strength 5/5 B/L upper and lower extremities; DTRs 2+ intact   LUNG: Normal Breath sounds, Clear to auscultation bilaterally, No rales, +coarse rhonci. --- slowly improving   HEART: RRR; No murmurs, No rubs  ABDOMEN: +BS, ST/ND/NT  GENITOURINARY: Voiding, Bladder not distended  EXTREMITIES:  2+ Peripheral Pulses, No clubbing, No cyanosis, No tibial edema  MUSCULOSKELTAL: Joints normal ROM, No TTP, No effusion  VAGINAL: deferred  SKIN: no rashes        MEDICATIONS  (STANDING):  acetylcysteine 10%  Inhalation 4 milliLiter(s) Inhalation three times a day  albuterol/ipratropium for Nebulization 3 milliLiter(s) Nebulizer every 6 hours  apixaban 5 milliGRAM(s) Oral every 12 hours  atorvastatin 20 milliGRAM(s) Oral at bedtime  buDESOnide    Inhalation Suspension 0.5 milliGRAM(s) Inhalation two times a day  glucagon  Injectable 1 milliGRAM(s) IntraMuscular once  guaiFENesin  milliGRAM(s) Oral every 12 hours  insulin glargine Injectable (LANTUS) 50 Unit(s) SubCutaneous at bedtime  insulin lispro (ADMELOG) corrective regimen sliding scale   SubCutaneous Before meals and at bedtime  insulin lispro Injectable (ADMELOG) 5 Unit(s) SubCutaneous three times a day before meals  methylPREDNISolone sodium succinate Injectable 20 milliGRAM(s) IV Push every 12 hours  mexiletine 200 milliGRAM(s) Oral every 8 hours  montelukast 10 milliGRAM(s) Oral at bedtime  pantoprazole    Tablet 40 milliGRAM(s) Oral before breakfast  trimethoprim  160 mG/sulfamethoxazole 800 mG 1 Tablet(s) Oral daily    MEDICATIONS  (PRN):  albuterol    90 MICROgram(s) HFA Inhaler 2 Puff(s) Inhalation every 4 hours PRN Shortness of Breath and/or Wheezing  dextrose Oral Gel 15 Gram(s) Oral once PRN Blood Glucose LESS THAN 70 milliGRAM(s)/deciliter  ondansetron   Disintegrating Tablet 4 milliGRAM(s) Oral every 6 hours PRN Nausea  polyethylene glycol 3350 17 Gram(s) Oral daily PRN Constipation      all labs reviewed  all imaging reviewed        72F hx asthma on chronic steroids, DM, pAfib on Eliquis, colorectal ca yrs ago s/p colostomy, recent admission for parainfluenza, PNA. P/w SOB, and elevated BG, not in DKA.    #Acute resp failure due to asthma exacerbation and bronchopneumonia  -on RA   -CT chest improved  -Sputum Cx: Proteus, yeast --- day 2 of Ertapenum and benadryl 50mg prior given hx of skin reaction. started on nystatin suspension   -ID evaluation   -s/p 5d Vanco IV, and aztreonam   -steroids now Methylpred 20iv q12 ,  will need prolonged taper  -nebs, mucomyst, Smart Vest   -appreciate pulm and ID input    #IDDM  -Lantus 50u qHS, ISS  -monitor BG while on steroids    #PCP ppx prevention given chronic steroids  -Bactrim TIW    #Afib  -Eliquis, Mexilitine    #DVT ppx- full a/c w Eliquis    PT eval    Dispo: f/u ID consult regarding sputum Cx, needs prolonged steroid taper outpatient  possible dc Thursday after completed course of IV Ertapenem     
72F hx/o severe persistent asthma on chronic steroids, previously in ICU,  DM, pAfib on Eliquis, colorectal ca yrs ago s/p colostomy, recent admission for parainfluenza, PNA. P/w SOB, found to have bronchopna and asthma exacerbation     Additional results/Imaging, I have personally reviewed:  CT chest noncon 6/4/23: Interval improvement of infectious/inflammatory bronchiolitis/bronchitis. Mild residual clusters of tree in bud type nodularity are seen at the right middle and right lower lobes inferiorly.    CXR 6/4/23: No acute pulmonary disease.    6.10: +deep productive coughing, gray sputum, +KRAMER, no wheezing   6.11: deep coughing , no wheezing, no fever/chills, +KRAMER  6/12: sitting up in bed, still with productive congested cough. and dyspnea on minimal exertion.   6/13: felt tremulous today, concerned about low blood sugar  (tested to be 200). breathing improving. tolerated abx overnight.   6/14: breathing improved today. still on abx. tolerated well.   6/15: feeling well today. no issues. still on steroid and abx will need long term abx upon dc which she is agreeable to      REVIEW OF SYSTEMS:    CONSTITUTIONAL: No weakness, No fevers or chills  ENT: No ear ache, No sorethroat  NECK: No pain, No stiffness  RESPIRATORY: +deep cough, No wheezing, No hemoptysis; + dyspnea slowly improving   CARDIOVASCULAR: No chest pain, No palpitations  GASTROINTESTINAL: No abd pain, No nausea, No vomiting, No hematemesis, No diarrhea or constipation. No melena, No hematochezia.  GENITOURINARY: No dysuria, No  hematuria  NEUROLOGICAL: No diplopia, No paresthesia, No motor dysfunction  MUSCULOSKELETAL: No arthralgia, No myalgia  SKIN: No rashes, or lesions   PSYCH: no anxiety, no suicidal ideation    All other review of systems is negative unless indicated above    Vital Signs Last 24 Hrs  T(C): 36.9 (14 Jun 2023 07:59), Max: 37.1 (13 Jun 2023 21:25)  T(F): 98.5 (14 Jun 2023 07:59), Max: 98.8 (13 Jun 2023 21:25)  HR: 84 (14 Jun 2023 07:59) (81 - 87)  BP: 138/71 (14 Jun 2023 07:59) (124/56 - 147/71)  BP(mean): --  RR: 18 (14 Jun 2023 07:59) (18 - 18)  SpO2: 95% (14 Jun 2023 07:59) (95% - 97%)    Parameters below as of 14 Jun 2023 07:59  Patient On (Oxygen Delivery Method): room air        PHYSICAL EXAM:    GENERAL: NAD  HEENT:  NC/AT, EOMI, PERRLA, No scleral icterus, Moist mucous membranes. exophthalmos to left eye   NECK: Supple, No JVD  CNS:  Alert & Oriented X3, Motor Strength 5/5 B/L upper and lower extremities; DTRs 2+ intact   LUNG: Normal Breath sounds, Clear to auscultation bilaterally, No rales, +coarse rhonci. --- slowly improving   HEART: RRR; No murmurs, No rubs  ABDOMEN: +BS, ST/ND/NT  GENITOURINARY: Voiding, Bladder not distended  EXTREMITIES:  2+ Peripheral Pulses, No clubbing, No cyanosis, No tibial edema  MUSCULOSKELTAL: Joints normal ROM, No TTP, No effusion  VAGINAL: deferred  SKIN: no rashes        MEDICATIONS  (STANDING):  acetylcysteine 10%  Inhalation 4 milliLiter(s) Inhalation three times a day  albuterol/ipratropium for Nebulization 3 milliLiter(s) Nebulizer every 6 hours  apixaban 5 milliGRAM(s) Oral every 12 hours  atorvastatin 20 milliGRAM(s) Oral at bedtime  buDESOnide    Inhalation Suspension 0.5 milliGRAM(s) Inhalation two times a day  glucagon  Injectable 1 milliGRAM(s) IntraMuscular once  guaiFENesin  milliGRAM(s) Oral every 12 hours  insulin glargine Injectable (LANTUS) 50 Unit(s) SubCutaneous at bedtime  insulin lispro (ADMELOG) corrective regimen sliding scale   SubCutaneous Before meals and at bedtime  insulin lispro Injectable (ADMELOG) 5 Unit(s) SubCutaneous three times a day before meals  methylPREDNISolone sodium succinate Injectable 20 milliGRAM(s) IV Push every 12 hours  mexiletine 200 milliGRAM(s) Oral every 8 hours  montelukast 10 milliGRAM(s) Oral at bedtime  pantoprazole    Tablet 40 milliGRAM(s) Oral before breakfast  trimethoprim  160 mG/sulfamethoxazole 800 mG 1 Tablet(s) Oral daily    MEDICATIONS  (PRN):  albuterol    90 MICROgram(s) HFA Inhaler 2 Puff(s) Inhalation every 4 hours PRN Shortness of Breath and/or Wheezing  dextrose Oral Gel 15 Gram(s) Oral once PRN Blood Glucose LESS THAN 70 milliGRAM(s)/deciliter  ondansetron   Disintegrating Tablet 4 milliGRAM(s) Oral every 6 hours PRN Nausea  polyethylene glycol 3350 17 Gram(s) Oral daily PRN Constipation      all labs reviewed  all imaging reviewed        72F hx asthma on chronic steroids, DM, pAfib on Eliquis, colorectal ca yrs ago s/p colostomy, recent admission for parainfluenza, PNA. P/w SOB, and elevated BG, not in DKA.    #Acute resp failure due to asthma exacerbation and bronchopneumonia  -on RA   -CT chest improved  -Sputum Cx: Proteus, yeast --- day 4 of Ertapenum and benadryl 50mg prior given hx of skin reaction. started on nystatin suspension   -ID evaluation   -s/p 5d Vanco IV, and aztreonam   -steroids now Methylpred 20iv q12 ,  will need prolonged taper  -nebs, mucomyst, Smart Vest   -appreciate pulm and ID input    #IDDM  -Lantus 50u qHS, ISS  -monitor BG while on steroids    #PCP ppx prevention given chronic steroids  -Bactrim TIW    #Afib  -Eliquis, Mexilitine    #DVT ppx- full a/c w Eliquis    PT eval    Dispo: f/u ID consult regarding sputum Cx, needs prolonged steroid taper outpatient  plan for midline for iv abx upon dc for proteus esbl      
CHIEF COMPLAINT: cough    SUBJECTIVE: 6/7/23 pt seen and examined lying in bed, saturating well on room air. + cough productive of brown mucous. No resp distress.     REVIEW OF SYSTEMS:  CONSTITUTIONAL: No weakness, fevers or chills  EYES/ENT: No visual changes;  No vertigo or throat pain   NECK: No pain or stiffness  RESPIRATORY: + cough and wheezing, no hemoptysis; No shortness of breath  CARDIOVASCULAR: No chest pain or palpitations  GASTROINTESTINAL: No abdominal or epigastric pain. No nausea, vomiting  GENITOURINARY: No dysuria, frequency or hematuria  NEUROLOGICAL: No numbness or weakness  All other review of systems is negative unless indicated above    Vital Signs Last 24 Hrs  T(C): 36.2 (07 Jun 2023 08:00), Max: 37.1 (07 Jun 2023 00:00)  T(F): 97.1 (07 Jun 2023 08:00), Max: 98.7 (07 Jun 2023 00:00)  HR: 77 (07 Jun 2023 08:00) (77 - 92)  BP: 112/65 (07 Jun 2023 08:00) (95/42 - 138/71)  BP(mean): 79 (07 Jun 2023 08:00) (58 - 96)  RR: 19 (07 Jun 2023 08:00) (17 - 28)  SpO2: 98% (07 Jun 2023 08:00) (91% - 100%)    Parameters below as of 07 Jun 2023 08:00  Patient On (Oxygen Delivery Method): room air    PHYSICAL EXAM:  Constitutional: NAD, awake and alert  HEENT: PERR, EOMI  Neck: Soft and supple, No JVD  Respiratory: scattered rhonchi bilaterally, + wheezing, no rales  Cardiovascular: S1 and S2, regular rate and rhythm, no Murmurs  Gastrointestinal: Bowel Sounds present, soft, nontender, nondistended  Extremities: No peripheral edema  Vascular: 2+ peripheral pulses  Neurological: A/O x 3, no focal deficits  Musculoskeletal: 5/5 strength b/l upper and lower extremities    MEDICATIONS:  MEDICATIONS  (STANDING):  acetylcysteine 10%  Inhalation 4 milliLiter(s) Inhalation three times a day  albuterol/ipratropium for Nebulization 3 milliLiter(s) Nebulizer every 6 hours  apixaban 5 milliGRAM(s) Oral every 12 hours  atorvastatin 20 milliGRAM(s) Oral at bedtime  aztreonam  IVPB 1000 milliGRAM(s) IV Intermittent every 8 hours  buDESOnide    Inhalation Suspension 0.5 milliGRAM(s) Inhalation two times a day  dextrose 5%. 1000 milliLiter(s) (50 mL/Hr) IV Continuous <Continuous>  dextrose 5%. 1000 milliLiter(s) (100 mL/Hr) IV Continuous <Continuous>  dextrose 50% Injectable 50 milliLiter(s) IV Push every 15 minutes  dextrose 50% Injectable 25 milliLiter(s) IV Push every 15 minutes  dextrose 50% Injectable 12.5 Gram(s) IV Push once  dextrose 50% Injectable 25 Gram(s) IV Push once  dextrose 50% Injectable 25 Gram(s) IV Push once  glucagon  Injectable 1 milliGRAM(s) IntraMuscular once  guaiFENesin  milliGRAM(s) Oral every 12 hours  insulin glargine Injectable (LANTUS) 55 Unit(s) SubCutaneous at bedtime  insulin lispro (ADMELOG) corrective regimen sliding scale   SubCutaneous Before meals and at bedtime  insulin lispro Injectable (ADMELOG) 5 Unit(s) SubCutaneous three times a day before meals  methylPREDNISolone 4 milliGRAM(s) Oral daily  mexiletine 200 milliGRAM(s) Oral every 8 hours  montelukast 10 milliGRAM(s) Oral at bedtime  pantoprazole    Tablet 40 milliGRAM(s) Oral before breakfast  trimethoprim  160 mG/sulfamethoxazole 800 mG 1 Tablet(s) Oral daily  vancomycin  IVPB 1000 milliGRAM(s) IV Intermittent every 12 hours    MEDICATIONS  (PRN):  albuterol    90 MICROgram(s) HFA Inhaler 2 Puff(s) Inhalation every 4 hours PRN Shortness of Breath and/or Wheezing  dextrose Oral Gel 15 Gram(s) Oral once PRN Blood Glucose LESS THAN 70 milliGRAM(s)/deciliter  ondansetron   Disintegrating Tablet 4 milliGRAM(s) Oral every 6 hours PRN Nausea  polyethylene glycol 3350 17 Gram(s) Oral daily PRN Constipation    LABS: All Labs Reviewed:                        11.0   11.23 )-----------( 142      ( 06 Jun 2023 05:35 )             36.1     06-06    142  |  110<H>  |  23  ----------------------------<  243<H>  4.4   |  28  |  0.59    Ca    8.4<L>      06 Jun 2023 05:35  Phos  2.6     06-06  Mg     2.1     06-06    TPro  6.7  /  Alb  3.2<L>  /  TBili  0.3  /  DBili  x   /  AST  19  /  ALT  25  /  AlkPhos  114  06-05    MICRO:  Blood Culture: 06-05 @ 04:00  Organism --  Gram Stain Blood -- Gram Stain --  Specimen Source .Blood Blood-Peripheral  Culture-Blood --    06-05 @ 03:40  Organism --  Gram Stain Blood -- Gram Stain --  Specimen Source Clean Catch Clean Catch (Midstream)  Culture-Blood --    RADIOLOGY:  < from: CT Chest No Cont (06.04.23 @ 22:01) >  FINDINGS:  LUNGS AND AIRWAYS: Patent central airways.  Overall improved appearance of previously noted nodules. Small residual clusters of tree-in-bud type nodules within the right lung base are appreciated within the right middle and right lower lobes. There are no confluent airspace opacities. Linear atelectasis or scarring is seen within the lingula.  PLEURA: No pleural effusion.  MEDIASTINUM AND MARANDA: No lymphadenopathy. A small hiatal hernia.  VESSELS: Ascending aortic prosthetic. Coronary and aortic calcifications are appreciated. Medially displaced calcification of the descending thoracic aorta is likely related to dissection. This appearance is stable compared to previous exam  HEART: Rate valve repair changes are appreciated. The heart size is normal. Valvular calcifications are noted. No pericardial effusion.  CHEST WALL AND LOWER NECK: Within normal limits.  VISUALIZED UPPER ABDOMEN: Punctate calcified granuloma of the right hepatic lobe. Calcification of the left kidney may be parenchymal in nature. Alternatively, this maybe related to complex cyst. If clinically sonography or contrast enhancement or MRI or CT can further delineation. Cystic lesions of the pancreatic tail are again noted and are stable.  BONES: Poststernotomy changes. Mottled appearance of the osseous structures, which may be related to osteopenia pattern, though may also indicate underlying metabolic or hematologic condition. Correlate clinically.  IMPRESSION:  Interval improvement of infectious/inflammatory bronchiolitis/bronchitis.   Mild residual clusters of tree in bud type nodularity are seen at the   right middle and right lower lobes inferiorly.  Additional findings as above.  < end of copied text >    < from: Xray Chest 1 View- PORTABLE-Urgent (06.04.23 @ 20:56) >  Overlying EKG leads and wires. The most inferior left costophrenic angle is cut from the film.  FINDINGS:  Heart/Vascular: The heart size, mediastinum, hilum and aorta are within normal limits for projection. Status post median sternotomy and aortic valve replacement.  Pulmonary: Midline trachea. There is no focal infiltrate, congestion or effusion.  Bones: There is no fracture.  Lines and catheter: None  Impression:  No acute pulmonary disease.  < end of copied text >    
Date of service: 23 @ 11:20    pt seen and examined  feels better   no resp distress  no further rash/itchiness  has cough  afebrile     ROS: no fever or chills; denies dizziness, no HA, no abdominal pain, no diarrhea or constipation; no dysuria, no urinary frequency, no legs pain, no rashes    MEDICATIONS  (STANDING):  acetylcysteine 10%  Inhalation 4 milliLiter(s) Inhalation three times a day  albuterol/ipratropium for Nebulization 3 milliLiter(s) Nebulizer every 6 hours  apixaban 5 milliGRAM(s) Oral every 12 hours  atorvastatin 20 milliGRAM(s) Oral at bedtime  aztreonam  IVPB 1000 milliGRAM(s) IV Intermittent every 8 hours  buDESOnide    Inhalation Suspension 0.5 milliGRAM(s) Inhalation two times a day  dextrose 5%. 1000 milliLiter(s) (50 mL/Hr) IV Continuous <Continuous>  dextrose 5%. 1000 milliLiter(s) (100 mL/Hr) IV Continuous <Continuous>  dextrose 50% Injectable 50 milliLiter(s) IV Push every 15 minutes  dextrose 50% Injectable 25 milliLiter(s) IV Push every 15 minutes  dextrose 50% Injectable 12.5 Gram(s) IV Push once  dextrose 50% Injectable 25 Gram(s) IV Push once  dextrose 50% Injectable 25 Gram(s) IV Push once  glucagon  Injectable 1 milliGRAM(s) IntraMuscular once  guaiFENesin  milliGRAM(s) Oral every 12 hours  insulin glargine Injectable (LANTUS) 55 Unit(s) SubCutaneous at bedtime  insulin lispro (ADMELOG) corrective regimen sliding scale   SubCutaneous Before meals and at bedtime  insulin lispro Injectable (ADMELOG) 5 Unit(s) SubCutaneous three times a day before meals  methylPREDNISolone 4 milliGRAM(s) Oral daily  mexiletine 200 milliGRAM(s) Oral every 8 hours  montelukast 10 milliGRAM(s) Oral at bedtime  pantoprazole    Tablet 40 milliGRAM(s) Oral before breakfast  trimethoprim  160 mG/sulfamethoxazole 800 mG 1 Tablet(s) Oral daily  vancomycin  IVPB 1000 milliGRAM(s) IV Intermittent every 12 hours      Vital Signs Last 24 Hrs  T(C): 36.2 (2023 08:00), Max: 37.1 (2023 00:00)  T(F): 97.1 (2023 08:00), Max: 98.7 (2023 00:00)  HR: 77 (2023 08:00) (77 - 92)  BP: 112/65 (2023 08:00) (95/42 - 138/71)  BP(mean): 79 (2023 08:00) (58 - 91)  RR: 19 (2023 08:00) (19 - 28)  SpO2: 98% (2023 08:00) (93% - 100%)    Parameters below as of 2023 08:00  Patient On (Oxygen Delivery Method): room air      PE:  Constitutional: frail looking  HEENT: NC/AT, EOMI, PERRLA, conjunctivae clear; ears and nose atraumatic; pharynx benign  Neck: supple; thyroid not palpable  Back: no tenderness  Respiratory: decreased breath sounds, rhonchi   Cardiovascular: S1S2 regular, no murmurs  Abdomen: soft, not tender, not distended, positive BS; liver and spleen WNL  Genitourinary: no suprapubic tenderness  Lymphatic: no LN palpable  Musculoskeletal: no muscle tenderness, no joint swelling or tenderness  Extremities: no pedal edema  Neurological/ Psychiatric: moving all extremities  Skin: no rashes; no palpable lesions    Labs: all available labs reviewed                                   11.0   11.23 )-----------( 142      ( 2023 05:35 )             36.1     06-06    142  |  110<H>  |  23  ----------------------------<  243<H>  4.4   |  28  |  0.59    Ca    8.4<L>      2023 05:35  Phos  2.6     06-06  Mg     2.1     06-06         Vancomycin Level, Trough: 14.7 ug/mL ( @ 21:37)      Urinalysis Basic - ( 2023 03:40 )    Color: Yellow / Appearance: Clear / S.020 / pH: x  Gluc: x / Ketone: Negative  / Bili: Negative / Urobili: Negative   Blood: x / Protein: 15 / Nitrite: Negative   Leuk Esterase: Negative / RBC: 0-2 /HPF / WBC 0-2 /HPF   Sq Epi: x / Non Sq Epi: x / Bacteria: Negative    Culture - Blood (23 @ 04:00)   Specimen Source: .Blood Blood-Peripheral  Culture Results:   No growth to date.    Radiology: all available radiological tests reviewed    ACC: [id number] EXAM:  CT CHEST   ORDERED BY: DELONTE NUGENT     PROCEDURE DATE:  2023          INTERPRETATION:  CLINICAL INFORMATION: Shortness of breath. History of   lung disease.    COMPARISON: 2023.    CONTRAST/COMPLICATIONS:  IV Contrast: NONE  Oral Contrast: NONE  Complications: None reported at time of study completion    PROCEDURE:  CT of the Chest was performed.  Sagittal and coronal reformats were performed.    FINDINGS:    LUNGS AND AIRWAYS: Patent central airways.  Overall improved appearance   of previously noted nodules. Small residual clusters of tree-in-bud type   nodules within the right lung base are appreciated within the right   middle and right lower lobes. There are no confluent airspace opacities.   Linear atelectasis or scarring is seen within the lingula.  PLEURA: No pleural effusion.  MEDIASTINUM AND MARANDA: No lymphadenopathy. A small hiatal hernia.  VESSELS: Ascending aortic prosthetic. Coronary and aortic calcifications   are appreciated. 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 This appearance is stable   compared to previous exam  HEART: Rate valve repair changes are appreciated. The heart size is   normal. Valvular calcifications are noted. No pericardial effusion.  CHEST WALL AND LOWER NECK: Within normal limits.  VISUALIZED UPPER ABDOMEN: Punctate calcified granuloma of the right   hepatic lobe. Calcification of the left kidney may be parenchymal in   nature. Alternatively, this maybe related to complex cyst. If clinically   sonography or contrast enhancement or MRI or CT can further delineation.  Cystic lesions of the pancreatic tail are again noted and are stable.  BONES: Poststernotomy changes. Mottled appearance of the osseous   structures, which may be related to osteopenia pattern, though may also   indicate underlying metabolic or hematologic condition. Correlate   clinically.    IMPRESSION:  Interval improvement of infectious/inflammatory bronchiolitis/bronchitis.   Mild residual clusters of tree in bud type nodularity are seen at the   right middle and right lower lobes inferiorly.    Additional findings as above.        Advanced directives addressed: full resuscitation
Date of service: 23 @ 12:22    pt seen and examined  feels better   no resp distress  afebrile     ROS: no fever or chills; denies dizziness, no HA, no abdominal pain, no diarrhea or constipation; no dysuria, no urinary frequency, no legs pain, no rashes    MEDICATIONS  (STANDING):  acetylcysteine 10%  Inhalation 4 milliLiter(s) Inhalation three times a day  albuterol/ipratropium for Nebulization 3 milliLiter(s) Nebulizer every 6 hours  apixaban 5 milliGRAM(s) Oral every 12 hours  atorvastatin 20 milliGRAM(s) Oral at bedtime  aztreonam  IVPB 1000 milliGRAM(s) IV Intermittent every 8 hours  buDESOnide    Inhalation Suspension 0.5 milliGRAM(s) Inhalation two times a day  dextrose 5%. 1000 milliLiter(s) (50 mL/Hr) IV Continuous <Continuous>  dextrose 5%. 1000 milliLiter(s) (100 mL/Hr) IV Continuous <Continuous>  dextrose 50% Injectable 50 milliLiter(s) IV Push every 15 minutes  dextrose 50% Injectable 25 milliLiter(s) IV Push every 15 minutes  dextrose 50% Injectable 12.5 Gram(s) IV Push once  dextrose 50% Injectable 25 Gram(s) IV Push once  dextrose 50% Injectable 25 Gram(s) IV Push once  glucagon  Injectable 1 milliGRAM(s) IntraMuscular once  guaiFENesin  milliGRAM(s) Oral every 12 hours  insulin glargine Injectable (LANTUS) 45 Unit(s) SubCutaneous at bedtime  insulin lispro (ADMELOG) corrective regimen sliding scale   SubCutaneous Before meals and at bedtime  insulin lispro Injectable (ADMELOG) 5 Unit(s) SubCutaneous three times a day before meals  methylPREDNISolone 4 milliGRAM(s) Oral daily  mexiletine 200 milliGRAM(s) Oral every 8 hours  montelukast 10 milliGRAM(s) Oral at bedtime  pantoprazole    Tablet 40 milliGRAM(s) Oral before breakfast  trimethoprim  160 mG/sulfamethoxazole 800 mG 1 Tablet(s) Oral daily  vancomycin  IVPB 1000 milliGRAM(s) IV Intermittent every 12 hours    Vital Signs Last 24 Hrs  T(C): 36.2 (2023 08:00), Max: 37.4 (2023 04:00)  T(F): 97.2 (2023 08:00), Max: 99.4 (2023 04:00)  HR: 72 (2023 09:00) (62 - 91)  BP: 159/78 (2023 09:00) (90/78 - 168/72)  BP(mean): 102 (2023 09:00) (77 - 103)  RR: 15 (2023 09:00) (15 - 30)  SpO2: 98% (2023 09:00) (94% - 100%)    Parameters below as of 2023 09:00  Patient On (Oxygen Delivery Method): room air    PE:  Constitutional: frail looking  HEENT: NC/AT, EOMI, PERRLA, conjunctivae clear; ears and nose atraumatic; pharynx benign  Neck: supple; thyroid not palpable  Back: no tenderness  Respiratory: decreased breath sounds, rhonchi   Cardiovascular: S1S2 regular, no murmurs  Abdomen: soft, not tender, not distended, positive BS; liver and spleen WNL  Genitourinary: no suprapubic tenderness  Lymphatic: no LN palpable  Musculoskeletal: no muscle tenderness, no joint swelling or tenderness  Extremities: no pedal edema  Neurological/ Psychiatric: moving all extremities  Skin: no rashes; no palpable lesions    Labs: all available labs reviewed                        11.0   11.23 )-----------( 142      ( 2023 05:35 )             36.1     06-06    142  |  110<H>  |  23  ----------------------------<  243<H>  4.4   |  28  |  0.59    Ca    8.4<L>      2023 05:35  Phos  2.6     06-06  Mg     2.1     06-06    TPro  6.7  /  Alb  3.2<L>  /  TBili  0.3  /  DBili  x   /  AST  19  /  ALT  25  /  AlkPhos  114  06-05      Urinalysis Basic - ( 2023 03:40 )    Color: Yellow / Appearance: Clear / S.020 / pH: x  Gluc: x / Ketone: Negative  / Bili: Negative / Urobili: Negative   Blood: x / Protein: 15 / Nitrite: Negative   Leuk Esterase: Negative / RBC: 0-2 /HPF / WBC 0-2 /HPF   Sq Epi: x / Non Sq Epi: x / Bacteria: Negative    Culture - Blood (23 @ 04:00)   Specimen Source: .Blood Blood-Peripheral  Culture Results:   No growth to date.    Radiology: all available radiological tests reviewed    ACC: [id number] EXAM:  CT CHEST   ORDERED BY: DELONTE NUGENT     PROCEDURE DATE:  2023          INTERPRETATION:  CLINICAL INFORMATION: Shortness of breath. History of   lung disease.    COMPARISON: 2023.    CONTRAST/COMPLICATIONS:  IV Contrast: NONE  Oral Contrast: NONE  Complications: None reported at time of study completion    PROCEDURE:  CT of the Chest was performed.  Sagittal and coronal reformats were performed.    FINDINGS:    LUNGS AND AIRWAYS: Patent central airways.  Overall improved appearance   of previously noted nodules. Small residual clusters of tree-in-bud type   nodules within the right lung base are appreciated within the right   middle and right lower lobes. There are no confluent airspace opacities.   Linear atelectasis or scarring is seen within the lingula.  PLEURA: No pleural effusion.  MEDIASTINUM AND MARANDA: No lymphadenopathy. A small hiatal hernia.  VESSELS: Ascending aortic prosthetic. Coronary and aortic calcifications   are appreciated. 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 This appearance is stable   compared to previous exam  HEART: Rate valve repair changes are appreciated. The heart size is   normal. Valvular calcifications are noted. No pericardial effusion.  CHEST WALL AND LOWER NECK: Within normal limits.  VISUALIZED UPPER ABDOMEN: Punctate calcified granuloma of the right   hepatic lobe. Calcification of the left kidney may be parenchymal in   nature. Alternatively, this maybe related to complex cyst. If clinically   sonography or contrast enhancement or MRI or CT can further delineation.  Cystic lesions of the pancreatic tail are again noted and are stable.  BONES: Poststernotomy changes. Mottled appearance of the osseous   structures, which may be related to osteopenia pattern, though may also   indicate underlying metabolic or hematologic condition. Correlate   clinically.    IMPRESSION:  Interval improvement of infectious/inflammatory bronchiolitis/bronchitis.   Mild residual clusters of tree in bud type nodularity are seen at the   right middle and right lower lobes inferiorly.    Additional findings as above.        Advanced directives addressed: full resuscitation
Events Overnight: was never on insulin drip, blood sugar in 200s, on solumedrol, feeling a little better, still slight sob    HPI:   72 year old female with pmh  asthma, diabetes, paroxysmal A-fib on Eliquis colorectal cancer many years ago status post colostomy  presented concern for shortness of breath.  States that she was recently discharged a few weeks ago from this hospital for parainfluenza.  Since being home she had done well and then 2 days ago she began to feel chest tightness with shortness of breath again.   She has been persistently on medrol steroids over past year  in ed had glucose of 500 and was transferred to ICU but was never started oon insulin drip  was not in DKA  CT chest some inflammatory changes, no clear infiltrates  RVP negative, had rash to meropenem, was started on vanco, Aztreonam    ROS feels slightly        6/5: Pt sitting in chair, c/o of malaise, SOB. Meds ordered.      Review of Systems:  Review of Systems: + SOB but improved, some greenish sputum, no chest pain, no abdominal pain, no fevers, no chills  rash better, no extremity pain, ,     PMH:      as above    Social hx. never smoked  Family hx. non contributory       MEDICATIONS  (STANDING):  acetylcysteine 10%  Inhalation 4 milliLiter(s) Inhalation three times a day  albuterol/ipratropium for Nebulization 3 milliLiter(s) Nebulizer every 6 hours  apixaban 5 milliGRAM(s) Oral every 12 hours  atorvastatin 20 milliGRAM(s) Oral at bedtime  aztreonam  IVPB 1000 milliGRAM(s) IV Intermittent every 8 hours  buDESOnide    Inhalation Suspension 0.5 milliGRAM(s) Inhalation two times a day  dextrose 5%. 1000 milliLiter(s) (50 mL/Hr) IV Continuous <Continuous>  dextrose 5%. 1000 milliLiter(s) (100 mL/Hr) IV Continuous <Continuous>  dextrose 50% Injectable 50 milliLiter(s) IV Push every 15 minutes  dextrose 50% Injectable 25 milliLiter(s) IV Push every 15 minutes  dextrose 50% Injectable 12.5 Gram(s) IV Push once  dextrose 50% Injectable 25 Gram(s) IV Push once  dextrose 50% Injectable 25 Gram(s) IV Push once  glucagon  Injectable 1 milliGRAM(s) IntraMuscular once  guaiFENesin  milliGRAM(s) Oral every 12 hours  insulin glargine Injectable (LANTUS) 45 Unit(s) SubCutaneous at bedtime  insulin lispro (ADMELOG) corrective regimen sliding scale   SubCutaneous Before meals and at bedtime  insulin lispro Injectable (ADMELOG) 5 Unit(s) SubCutaneous three times a day before meals  methylPREDNISolone 4 milliGRAM(s) Oral daily  mexiletine 200 milliGRAM(s) Oral every 8 hours  montelukast 10 milliGRAM(s) Oral at bedtime  pantoprazole    Tablet 40 milliGRAM(s) Oral before breakfast  vancomycin  IVPB 1000 milliGRAM(s) IV Intermittent every 12 hours    MEDICATIONS  (PRN):  albuterol    90 MICROgram(s) HFA Inhaler 2 Puff(s) Inhalation every 4 hours PRN Shortness of Breath and/or Wheezing  dextrose Oral Gel 15 Gram(s) Oral once PRN Blood Glucose LESS THAN 70 milliGRAM(s)/deciliter  ondansetron   Disintegrating Tablet 4 milliGRAM(s) Oral every 6 hours PRN Nausea    Weight (kg): 68.1 ( @ 13:08)    ICU Vital Signs Last 24 Hrs  T(C): 36.2 (2023 08:00), Max: 37.4 (2023 04:00)  T(F): 97.2 (2023 08:00), Max: 99.4 (2023 04:00)  HR: 72 (2023 09:00) (62 - 94)  BP: 159/78 (2023 09:00) (90/78 - 168/72)  BP(mean): 102 (2023 09:00) (77 - 103)  ABP: --  ABP(mean): --  RR: 15 (2023 09:00) (15 - 30)  SpO2: 98% (2023 09:00) (94% - 100%)    O2 Parameters below as of 2023 09:00  Patient On (Oxygen Delivery Method): room air    Physical Exam    general no distress , sitting in chair  HEENT - nc/at  neck supple  lungs distant bs, no wheezing  cv irreg,irreg no murmur  abdomen s/p colostomy  extrmeiteis warm  neuro awake alert, gcs 15  skin rash resolved   voiding    I&O's Summary    2023 07:01  -  2023 07:00  --------------------------------------------------------  IN: 1195 mL / OUT: 0 mL / NET: 1195 mL                            11.0   11.23 )-----------( 142      ( 2023 05:35 )             36.1       06-06    142  |  110<H>  |  23  ----------------------------<  243<H>  4.4   |  28  |  0.59    Ca    8.4<L>      2023 05:35  Phos  2.6     06-  Mg     2.1     06-    TPro  6.7  /  Alb  3.2<L>  /  TBili  0.3  /  DBili  x   /  AST  19  /  ALT  25  /  AlkPhos  114  06-05      ABG - ( 2023 18:00 )  pH, Arterial: 7.39  pH, Blood: x     /  pCO2: 33    /  pO2: 115   / HCO3: 20    / Base Excess: -4.1  /  SaO2: 99          Urinalysis Basic - ( 2023 03:40 )    Color: Yellow / Appearance: Clear / S.020 / pH: x  Gluc: x / Ketone: Negative  / Bili: Negative / Urobili: Negative   Blood: x / Protein: 15 / Nitrite: Negative   Leuk Esterase: Negative / RBC: 0-2 /HPF / WBC 0-2 /HPF   Sq Epi: x / Non Sq Epi: x / Bacteria: Negative    DVT Prophylaxis:  Eliquis                                                                Advanced Directives: Full Code                     
HOSPITALIST ATTENDING PROGRESS NOTE    Chart and meds reviewed.  Patient seen and examined.    CC: SOB    Subjective: Pt reports chest tightness and SOB. Reports was taking higher steroid dose at home. Denies CP.     All other systems reviewed and found to be negative with the exception of what has been described above.    MEDICATIONS  (STANDING):  acetylcysteine 10%  Inhalation 4 milliLiter(s) Inhalation three times a day  albuterol/ipratropium for Nebulization 3 milliLiter(s) Nebulizer every 6 hours  apixaban 5 milliGRAM(s) Oral every 12 hours  atorvastatin 20 milliGRAM(s) Oral at bedtime  aztreonam  IVPB 1000 milliGRAM(s) IV Intermittent every 8 hours  buDESOnide    Inhalation Suspension 0.5 milliGRAM(s) Inhalation two times a day  dextrose 5%. 1000 milliLiter(s) (50 mL/Hr) IV Continuous <Continuous>  dextrose 5%. 1000 milliLiter(s) (100 mL/Hr) IV Continuous <Continuous>  dextrose 50% Injectable 50 milliLiter(s) IV Push every 15 minutes  dextrose 50% Injectable 25 Gram(s) IV Push once  dextrose 50% Injectable 12.5 Gram(s) IV Push once  dextrose 50% Injectable 25 Gram(s) IV Push once  dextrose 50% Injectable 25 milliLiter(s) IV Push every 15 minutes  glucagon  Injectable 1 milliGRAM(s) IntraMuscular once  guaiFENesin  milliGRAM(s) Oral every 12 hours  insulin glargine Injectable (LANTUS) 55 Unit(s) SubCutaneous at bedtime  insulin lispro (ADMELOG) corrective regimen sliding scale   SubCutaneous Before meals and at bedtime  insulin lispro Injectable (ADMELOG) 5 Unit(s) SubCutaneous three times a day before meals  mexiletine 200 milliGRAM(s) Oral every 8 hours  montelukast 10 milliGRAM(s) Oral at bedtime  pantoprazole    Tablet 40 milliGRAM(s) Oral before breakfast  trimethoprim  160 mG/sulfamethoxazole 800 mG 1 Tablet(s) Oral daily  vancomycin  IVPB 1000 milliGRAM(s) IV Intermittent every 12 hours    MEDICATIONS  (PRN):  albuterol    90 MICROgram(s) HFA Inhaler 2 Puff(s) Inhalation every 4 hours PRN Shortness of Breath and/or Wheezing  dextrose Oral Gel 15 Gram(s) Oral once PRN Blood Glucose LESS THAN 70 milliGRAM(s)/deciliter  ondansetron   Disintegrating Tablet 4 milliGRAM(s) Oral every 6 hours PRN Nausea  polyethylene glycol 3350 17 Gram(s) Oral daily PRN Constipation      VITALS:  T(F): 97.3 (06-07-23 @ 12:00), Max: 98.7 (06-07-23 @ 00:00)  HR: 92 (06-07-23 @ 12:35) (77 - 92)  BP: 119/67 (06-07-23 @ 12:35) (95/42 - 124/73)  RR: 24 (06-07-23 @ 12:35) (19 - 28)  SpO2: 95% (06-07-23 @ 12:35) (93% - 100%)  Wt(kg): --    I&O's Summary    06 Jun 2023 07:01  -  07 Jun 2023 07:00  --------------------------------------------------------  IN: 990 mL / OUT: 0 mL / NET: 990 mL        CAPILLARY BLOOD GLUCOSE      POCT Blood Glucose.: 235 mg/dL (07 Jun 2023 12:09)  POCT Blood Glucose.: 168 mg/dL (07 Jun 2023 08:14)  POCT Blood Glucose.: 213 mg/dL (06 Jun 2023 21:23)  POCT Blood Glucose.: 308 mg/dL (06 Jun 2023 17:54)      PHYSICAL EXAM:  Gen: NAD  HEENT:  pupils equal and reactive, EOMI, no oropharyngeal lesions, erythema, exudates, oral thrush  NECK:   supple, no carotid bruits, no palpable lymph nodes, no thyromegaly  CV:  +S1, +S2, regular, no murmurs or rubs  RESP: fair air mvmnt, scattered rhonchi  BREAST:  not examined  GI:  abdomen soft, non-tender, non-distended, normal BS, no bruits, no abdominal masses, no palpable masses  RECTAL:  not examined  :  not examined  MSK:   normal muscle tone, no atrophy, no rigidity, no contractions  EXT:  no clubbing, no cyanosis, no edema, no calf pain, swelling or erythema  VASCULAR:  pulses equal and symmetric in the upper and lower extremities  NEURO:  AAOX3, no focal neurological deficits, follows all commands, able to move extremities spontaneously  SKIN:  no ulcers, lesions or rashes    LABS:                            11.0   11.23 )-----------( 142      ( 06 Jun 2023 05:35 )             36.1     06-06    142  |  110<H>  |  23  ----------------------------<  243<H>  4.4   |  28  |  0.59    Ca    8.4<L>      06 Jun 2023 05:35  Phos  2.6     06-06  Mg     2.1     06-06    ABG - ( 05 Jun 2023 18:00 )  pH, Arterial: 7.39  pH, Blood: x     /  pCO2: 33    /  pO2: 115   / HCO3: 20    / Base Excess: -4.1  /  SaO2: 99        CULTURES:  BCx NG  UCX 10-49K    Additional results/Imaging, I have personally reviewed:  CT chest noncon 6/4/23: Interval improvement of infectious/inflammatory bronchiolitis/bronchitis. Mild residual clusters of tree in bud type nodularity are seen at the right middle and right lower lobes inferiorly.    CXR 6/4/23: No acute pulmonary disease.

## 2023-06-16 NOTE — ED ADULT TRIAGE NOTE - CHIEF COMPLAINT QUOTE
Pt presents with midline that was inserted earlier today that is bleeding.  Midline placed on R arm.  Pt d/judi today.

## 2023-06-16 NOTE — ED STATDOCS - PATIENT PORTAL LINK FT
You can access the FollowMyHealth Patient Portal offered by [facility] by registering at the following website: [email or web address]. By joining [employer]’s FollowMyHealth portal, you will also be able to view your health information using other applications (apps) compatible with our system.

## 2023-06-16 NOTE — DISCHARGE NOTE PROVIDER - CARE PROVIDER_API CALL
Eulalio Allison  Pulmonary Disease  [address], [address]  [place] [address]  Phone: [phone]  Fax: [phone]  Established Patient  Follow Up Time: 1 week    [facility]  Internal Medicine  [address], [address]  [place] [address]  Phone: [phone]  Fax: [phone]  Established Patient  Follow Up Time: 1 week

## 2023-06-17 ENCOUNTER — TRANSCRIPTION ENCOUNTER (OUTPATIENT)
Age: 73
End: 2023-06-17

## 2023-06-17 VITALS
SYSTOLIC BLOOD PRESSURE: 112 MMHG | HEART RATE: 77 BPM | RESPIRATION RATE: 17 BRPM | DIASTOLIC BLOOD PRESSURE: 78 MMHG | OXYGEN SATURATION: 99 %

## 2023-06-17 LAB
A1C WITH ESTIMATED AVERAGE GLUCOSE RESULT: 9.1 % — HIGH (ref 4–5.6)
ALBUMIN SERPL ELPH-MCNC: 3 G/DL — LOW (ref 3.3–5)
ALP SERPL-CCNC: 88 U/L — SIGNIFICANT CHANGE UP (ref 40–120)
ALT FLD-CCNC: 27 U/L — SIGNIFICANT CHANGE UP (ref 12–78)
ANION GAP SERPL CALC-SCNC: 2 MMOL/L — LOW (ref 5–17)
APTT BLD: 26.2 SEC — LOW (ref 27.5–35.5)
AST SERPL-CCNC: 27 U/L — SIGNIFICANT CHANGE UP (ref 15–37)
BILIRUB SERPL-MCNC: 0.3 MG/DL — SIGNIFICANT CHANGE UP (ref 0.2–1.2)
BLD GP AB SCN SERPL QL: SIGNIFICANT CHANGE UP
BUN SERPL-MCNC: 24 MG/DL — HIGH (ref 7–23)
CALCIUM SERPL-MCNC: 8.9 MG/DL — SIGNIFICANT CHANGE UP (ref 8.5–10.1)
CHLORIDE SERPL-SCNC: 109 MMOL/L — HIGH (ref 96–108)
CO2 SERPL-SCNC: 33 MMOL/L — HIGH (ref 22–31)
CREAT SERPL-MCNC: 0.64 MG/DL — SIGNIFICANT CHANGE UP (ref 0.5–1.3)
EGFR: 93 ML/MIN/1.73M2 — SIGNIFICANT CHANGE UP
ESTIMATED AVERAGE GLUCOSE: 214 MG/DL — HIGH (ref 68–114)
GLUCOSE SERPL-MCNC: 93 MG/DL — SIGNIFICANT CHANGE UP (ref 70–99)
HCT VFR BLD CALC: 38.4 % — SIGNIFICANT CHANGE UP (ref 34.5–45)
HGB BLD-MCNC: 11.6 G/DL — SIGNIFICANT CHANGE UP (ref 11.5–15.5)
INR BLD: 1.01 RATIO — SIGNIFICANT CHANGE UP (ref 0.88–1.16)
MCHC RBC-ENTMCNC: 23 PG — LOW (ref 27–34)
MCHC RBC-ENTMCNC: 30.2 GM/DL — LOW (ref 32–36)
MCV RBC AUTO: 76.2 FL — LOW (ref 80–100)
NRBC # BLD: 4 /100 WBCS — HIGH (ref 0–0)
PLATELET # BLD AUTO: 113 K/UL — LOW (ref 150–400)
POTASSIUM SERPL-MCNC: 4.2 MMOL/L — SIGNIFICANT CHANGE UP (ref 3.5–5.3)
POTASSIUM SERPL-SCNC: 4.2 MMOL/L — SIGNIFICANT CHANGE UP (ref 3.5–5.3)
PROT SERPL-MCNC: 6 GM/DL — SIGNIFICANT CHANGE UP (ref 6–8.3)
PROTHROM AB SERPL-ACNC: 11.7 SEC — SIGNIFICANT CHANGE UP (ref 10.5–13.4)
RBC # BLD: 5.04 M/UL — SIGNIFICANT CHANGE UP (ref 3.8–5.2)
RBC # FLD: 26.1 % — HIGH (ref 10.3–14.5)
SODIUM SERPL-SCNC: 144 MMOL/L — SIGNIFICANT CHANGE UP (ref 135–145)
WBC # BLD: 12.55 K/UL — HIGH (ref 3.8–10.5)
WBC # FLD AUTO: 12.55 K/UL — HIGH (ref 3.8–10.5)

## 2023-06-17 PROCEDURE — 93931 UPPER EXTREMITY STUDY: CPT | Mod: 26,RT

## 2023-06-17 RX ORDER — DIPHENHYDRAMINE HCL 50 MG
50 CAPSULE ORAL ONCE
Refills: 0 | Status: COMPLETED | OUTPATIENT
Start: 2023-06-17 | End: 2023-06-17

## 2023-06-17 RX ORDER — ALBUTEROL 90 UG/1
2.5 AEROSOL, METERED ORAL ONCE
Refills: 0 | Status: COMPLETED | OUTPATIENT
Start: 2023-06-17 | End: 2023-06-17

## 2023-06-17 RX ORDER — IPRATROPIUM/ALBUTEROL SULFATE 18-103MCG
3 AEROSOL WITH ADAPTER (GRAM) INHALATION ONCE
Refills: 0 | Status: COMPLETED | OUTPATIENT
Start: 2023-06-17 | End: 2023-06-17

## 2023-06-17 RX ORDER — BUDESONIDE, MICRONIZED 100 %
0.5 POWDER (GRAM) MISCELLANEOUS ONCE
Refills: 0 | Status: COMPLETED | OUTPATIENT
Start: 2023-06-17 | End: 2023-06-17

## 2023-06-17 RX ORDER — ERTAPENEM SODIUM 1 G/1
1000 INJECTION, POWDER, LYOPHILIZED, FOR SOLUTION INTRAMUSCULAR; INTRAVENOUS ONCE
Refills: 0 | Status: COMPLETED | OUTPATIENT
Start: 2023-06-17 | End: 2023-06-17

## 2023-06-17 RX ORDER — ACETYLCYSTEINE 200 MG/ML
2 VIAL (ML) MISCELLANEOUS ONCE
Refills: 0 | Status: DISCONTINUED | OUTPATIENT
Start: 2023-06-17 | End: 2023-06-17

## 2023-06-17 RX ORDER — ACETYLCYSTEINE 200 MG/ML
4 VIAL (ML) MISCELLANEOUS ONCE
Refills: 0 | Status: COMPLETED | OUTPATIENT
Start: 2023-06-17 | End: 2023-06-17

## 2023-06-17 RX ORDER — BUDESONIDE, MICRONIZED 100 %
0.25 POWDER (GRAM) MISCELLANEOUS ONCE
Refills: 0 | Status: DISCONTINUED | OUTPATIENT
Start: 2023-06-17 | End: 2023-06-17

## 2023-06-17 RX ADMIN — Medication 3 MILLILITER(S): at 02:37

## 2023-06-17 RX ADMIN — Medication 4 MILLILITER(S): at 02:37

## 2023-06-17 RX ADMIN — ALBUTEROL 2.5 MILLIGRAM(S): 90 AEROSOL, METERED ORAL at 14:05

## 2023-06-17 RX ADMIN — ERTAPENEM SODIUM 120 MILLIGRAM(S): 1 INJECTION, POWDER, LYOPHILIZED, FOR SOLUTION INTRAMUSCULAR; INTRAVENOUS at 14:06

## 2023-06-17 RX ADMIN — Medication 0.5 MILLIGRAM(S): at 02:38

## 2023-06-17 RX ADMIN — Medication 50 MILLIGRAM(S): at 14:05

## 2023-06-17 NOTE — CHART NOTE - NSCHARTNOTEFT_GEN_A_CORE
CM met with patient at bedside, role explained. Pt was discharged from the hospital yesterday with arranged services [facility] and [facility] to start today. CM contacted [facility] and [facility] and informed them that pt will be receiving her Ertapenem and benadryl in the ER today. The new start of care for both agencies is Sunday, 6/18. Midline report was faxed to [facility].

## 2023-06-17 NOTE — PROVIDER CONTACT NOTE (OTHER) - RECOMMENDATIONS
Get a US Duplex of Right Upper arm, order blood work panel, will attempt to insert midline Left arm and if unsuccessful an IR consult will need to be placed.

## 2023-06-17 NOTE — ADVANCED PRACTICE NURSE CONSULT - ASSESSMENT
Patient received resting in bed, awake, alert, oriented x 4, pleasant and cooperative. Risks and benefits of midline catheter placement explained to patient, verbal consent obtained at bedside . Patient verbalized understanding via teach back method.  Time out and hand hygiene performed. BioFlo Midline with endexo technology(LOT #[id number]) 4F, 8cm inserted to left basilic vein via ultrasound guidance via sterile technique. Lidocaine used prior to insertion. Flushes well with 20cc of NS. Brisk blood return present. patient is on eliquis and bruises easily. After dilator was inserted, small bruise formed. Catheter was unable to be inserted all 8cm, and 1.5cm pulled to ensure proper blood return, secured with a statlock. Dressing, CHG patch and end cap placed. MD Kristian Campos at bedside, confirmed placement of catheter via ultrasound.

## 2023-06-17 NOTE — ED ADULT NURSE NOTE - NSFALLRISKINTERV_ED_ALL_ED

## 2023-06-17 NOTE — PROVIDER CONTACT NOTE (OTHER) - BACKGROUND
Invanz was ordered for 10 more days to treat infection. Patient was discharged from  on 6/16.  6/16 R Upper arm Basilic 18g/10cm midline placed

## 2023-06-17 NOTE — ED ADULT NURSE NOTE - NSICDXPASTSURGICALHX_GEN_ALL_CORE_FT
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    H/O pelvic surgery 5 years ago - s/p fracture    H/O total knee replacement, bilateral 5 years ago    History of partial hysterectomy 30 years ago - fibroids    History of sinus surgery multiple sinus surgeries    History of tracheomalacia 2015 - attempted tracheal stenting ([facility])- course complicated by obstruction, respiratory failure, multiple CPR attempts -  stent discontinued; 10/20/2016 Tracheobronchoplasty (Prolene Mesh) performed at [facility] by Dr Zapien    Rectal bleeding exam under anesthesia ([facility]) 2/2018    S/P bronchoscopy 6/5/2018 - [place] (Dr Zapien) no evidence of tracheobronchomalacia in trachea or bronchial tubes

## 2023-06-17 NOTE — ED ADULT NURSE NOTE - OBJECTIVE STATEMENT
Pt presented to ER c/o bleeding from midline that was placed yesterday before she was discharged from . Pt called hospital and was advised to come to the ER. Pt with no other complaints at this time.

## 2023-06-17 NOTE — ADVANCED PRACTICE NURSE CONSULT - RECOMMEDATIONS
Report given to district RN. Due to last midline failing, Estelle Boothe RN notified to assess arm frequently for any signs of complications before discharge

## 2023-06-17 NOTE — PROVIDER CONTACT NOTE (OTHER) - ASSESSMENT
After removal large blood loss noted, hematoma growing in size. pressure with gauze applied for 10min then pressure dressing applied.

## 2023-06-17 NOTE — PROVIDER CONTACT NOTE (OTHER) - SITUATION
Upon assessment midline was saturated in blood and hematoma was present above insertion site. Midline removed all 10cm present.

## 2023-06-19 ENCOUNTER — NON-APPOINTMENT (OUTPATIENT)
Age: 73
End: 2023-06-19

## 2023-06-19 ENCOUNTER — TRANSCRIPTION ENCOUNTER (OUTPATIENT)
Age: 73
End: 2023-06-19

## 2023-06-20 ENCOUNTER — APPOINTMENT (OUTPATIENT)
Dept: INTERNAL MEDICINE | Facility: CLINIC | Age: 73
End: 2023-06-20
Payer: MEDICARE

## 2023-06-20 PROCEDURE — 99213 OFFICE O/P EST LOW 20 MIN: CPT

## 2023-06-20 NOTE — ASSESSMENT
[FreeTextEntry1] : Reassured regarding hematoma.  Signs and symptoms described to warrant urgent medical attention \par \par \par 24 minutes spent in preparation of this visit, including review of previous notes and test results, interview and examination of patient, discussion of plan, arranging for appropriate testing and treatment, and documentation.\par

## 2023-06-20 NOTE — PHYSICAL EXAM
[de-identified] : Large hematoma right, medial upper arm, extending to antecubital fossa.  Soft tissues not tense, no neruo deficits in arm.  Pulsations normal.

## 2023-06-20 NOTE — HISTORY OF PRESENT ILLNESS
[de-identified] : Recently had IV removed from right arm due to placement issues. \par Developed a hematoma. \par Non-tender.  No numbness in arm.

## 2023-06-22 ENCOUNTER — APPOINTMENT (OUTPATIENT)
Dept: PULMONOLOGY | Facility: CLINIC | Age: 73
End: 2023-06-22
Payer: MEDICARE

## 2023-06-22 ENCOUNTER — NON-APPOINTMENT (OUTPATIENT)
Age: 73
End: 2023-06-22

## 2023-06-22 VITALS
WEIGHT: 143 LBS | SYSTOLIC BLOOD PRESSURE: 120 MMHG | RESPIRATION RATE: 16 BRPM | HEIGHT: 67 IN | OXYGEN SATURATION: 96 % | TEMPERATURE: 97.1 F | HEART RATE: 78 BPM | BODY MASS INDEX: 22.44 KG/M2 | DIASTOLIC BLOOD PRESSURE: 70 MMHG

## 2023-06-22 PROCEDURE — 99214 OFFICE O/P EST MOD 30 MIN: CPT | Mod: 25

## 2023-06-22 PROCEDURE — 94010 BREATHING CAPACITY TEST: CPT

## 2023-06-22 PROCEDURE — 96372 THER/PROPH/DIAG INJ SC/IM: CPT

## 2023-06-22 RX ORDER — TEZEPELUMAB-EKKO 210 MG/1.9ML
210 INJECTION, SOLUTION SUBCUTANEOUS
Qty: 0 | Refills: 0 | Status: COMPLETED | OUTPATIENT
Start: 2023-06-21

## 2023-06-23 RX ORDER — SODIUM CHLORIDE FOR INHALATION 0.9 %
0.9 VIAL, NEBULIZER (ML) INHALATION
Qty: 3 | Refills: 1 | Status: ACTIVE | COMMUNITY
Start: 2023-06-20 | End: 1900-01-01

## 2023-06-26 NOTE — REASON FOR VISIT
[Follow-Up] : a follow-up visit [Family Member] : family member [Asthma] : asthma [Cough] : cough [Shortness of Breath] : shortness of breath

## 2023-06-26 NOTE — CDI QUERY NOTE - NSCDINOTECODERNAME_GEN_A_CORE_FT
Please tell the patient to come today around 3:00 after finishing with home schooling, can bring her son, we do need a blood pressure and the heart rate    BP Readings from Last 3 Encounters:   07/07/21 118/68   02/01/21 127/80   09/15/20 105/70
Scarlett Corbett RN CCS
Scarlett Corbett RN CCS

## 2023-06-26 NOTE — CDI QUERY NOTE - NSCDIOTHERTXTBX_GEN_ALL_CORE_HH
Although acute hypoxemic respiratory failure is documented in the discharge summary, it lacks the specific indicators to clinically substantiate and support the diagnosis. The patient has remained on room air this admission with the lowest oxygen saturation of 93%. Additionally, per previous CDI query on 6/7/2023 acute respiratory failure was documented as ruled out.     Supporting documentation:     lood Gas Profile - Arterial in AM (06.05.23 @ 18:00)   pH, Arterial: 7.39  pCO2, Arterial: 33 mmHg  pO2, Arterial: 115 mmHg  HCO3, Arterial: 20 mmol/L  Base Excess, Arterial: -4.1 mmol/L  Oxygen Saturation, Arterial: 99 %    Blood Gas Profile - Venous (06.04.23 @ 20:18)   pH, Venous: 7.51  pCO2, Venous: 36 mmHg  pO2, Venous: 122 mmHg  HCO3, Venous: 29 mmol/L  Base Excess, Venous: 5.6 mmol/L  Oxygen Saturation, Venous: 99 %    Pulmonary consult 6/5/2023   ABG's have improved.   RR: 18 (05 Jun 2023 18:30) (17 - 25)  SpO2: 100% (05 Jun 2023 18:30) (96% - 100%)  Patient On (Oxygen Delivery Method): room air  Acute hypoxemic respiratory failure.     Pulmonology progress note 6/7/2023   saturating well on room air. No resp distress.   RR: 19 (07 Jun 2023 08:00) (17 - 28)  SpO2: 98% (07 Jun 2023 08:00) (91% - 100%)  Acute hypoxemic respiratory failure.     Chart Note- Event attending 6/7/2023   - Acute hypoxemic respiratory failure is ruled out after study.  Jane Mariano)  (Signed 07-Jun-2023 13:31)    Discharge provider note 6/16/2023   Acute resp failure with hypoxia due to asthma/COPD exacerbation and bronchopneumonia  -now on RA   -steroids now Methylpred 20iv q12 ,  c/w prolonged medrol taper      Due to additional conflicting documentation above can the status of the respiratory failure be clarified?      - Acute hypoxemic respiratory failure is ruled out after study  - Acute hypoxemic respiratory failure confirmed (please document additional supporting information or mitigating factors to support this diagnosis)  - Other (specify).
Although acute hypoxemic respiratory failure is documented in the medical record, it lacks the specific indicators to clinically substantiate and support the diagnosis. The patient has remained on room air this admission.     Supporting documentation:     Blood Gas Profile - Arterial in AM (06.05.23 @ 18:00)   pH, Arterial: 7.39  pCO2, Arterial: 33 mmHg  pO2, Arterial: 115 mmHg  HCO3, Arterial: 20 mmol/L  Base Excess, Arterial: -4.1 mmol/L  Oxygen Saturation, Arterial: 99 %    Blood Gas Profile - Venous (06.04.23 @ 20:18)   pH, Venous: 7.51  pCO2, Venous: 36 mmHg  pO2, Venous: 122 mmHg  HCO3, Venous: 29 mmol/L  Base Excess, Venous: 5.6 mmol/L  Oxygen Saturation, Venous: 99 %    Pulmonary consult 6/5/2023   ABG's have improved.   RR: 18 (05 Jun 2023 18:30) (17 - 25)  SpO2: 100% (05 Jun 2023 18:30) (96% - 100%)  Patient On (Oxygen Delivery Method): room air  Acute hypoxemic respiratory failure.     Pulmonology progress note 6/7/2023   saturating well on room air. No resp distress.   RR: 19 (07 Jun 2023 08:00) (17 - 28)  SpO2: 98% (07 Jun 2023 08:00) (91% - 100%)  Acute hypoxemic respiratory failure.     In order to ensure accurate coding and accuracy of the clinical record, the documentation in this patient’s medical record requires additional clarification.      Please clarify the status of respiratory function in your progress note and/or discharge summary:    - Acute hypoxemic respiratory failure is ruled out after study  - Acute hypoxemic respiratory failure confirmed (please document additional supporting information or mitigating factors to support this diagnosis)  - Other (specify)

## 2023-06-26 NOTE — REVIEW OF SYSTEMS
[Cough] : cough [Sputum] : sputum [SOB on Exertion] : sob on exertion [Negative] : Psychiatric [Fever] : no fever [Fatigue] : no fatigue [Recent Wt Gain (___ Lbs)] : ~T no recent weight gain [EDS] : no EDS [Chills] : no chills [Poor Appetite] : no poor appetite [Recent Wt Loss (___ Lbs)] : ~T no recent weight loss [Hemoptysis] : no hemoptysis [Chest Tightness] : no chest tightness [Frequent URIs] : no frequent URIs [Dyspnea] : no dyspnea [Pleuritic Pain] : no pleuritic pain [Wheezing] : no wheezing [A.M. Dry Mouth] : no a.m. dry mouth

## 2023-06-26 NOTE — PROCEDURE
[FreeTextEntry1] : SPI performed in office show severe airway obstruction.\par FEV: 42\par FEV1/FVC Ratio: 70\par WMW12-23%:23\par \par

## 2023-06-26 NOTE — HISTORY OF PRESENT ILLNESS
[TextBox_4] : Ms. Bloom is a 72 year old female with a history of abnormal CXR/chest CT, allergic rhinitis, severe persistent asthma, bronchiectasis, GERD, COPD, recurrent PNA, PND, s/p tracheoplasty for TBM, Covid-19 infection 11/2022, and SOB presenting to the office today for a hospital follow up visit. She is accompanied by her daughter.\par \par Her chief concern is recent hospitalization..\par \par Patient was admitted to [facility] from 6/5/2023 - 6/16/2023 for shortness of breath and elevated blood sugar.  She states she was discharged home with with an IV line in her right arm which caused hematoma on her right upper extremity.  Patient had to go back to [facility] for IV line in left arm to continue IV antibiotics.  Patient is currently on Medrol taper dosage.  She states she is compliant with the nebulizers and inhalers and feels that shortness of breath and cough has improved.\par Patient recently had blood work with her PCP and was noted to have a hemoglobin of 10.9.  She states she is not aware that she is anemic but will follow-up with PCP and hematology.\par Daughter is requesting physical therapy for patient.  Patient has been in and out of hospital since beginning of this year and is not steady on her feet.\par \par Patient denies fever, chills, SOB @ rest, wheezing, nasal congestion, runny nose, PND, or heartburn/reflux.\par \par

## 2023-06-26 NOTE — ASSESSMENT
[FreeTextEntry1] : Ms. Bloom is a 72 year old female with a history of abnormal CXR/chest CT, allergic rhinitis, severe persistent asthma, bronchiectasis, GERD, COPD, recurrent PNA, PND, s/p tracheoplasty for TBM, Covid-19 infection 11/2022, and SOB presenting to the office today for a hospital follow up visit. She is accompanied by her daughter.\par \par 1. Severe persistent asthma -(steroid dependent):\par - continue Medrol tapered dose\par - continue DuoNeb via nebulizer, Q6H. \par - continue Budesonide 0.5% via the nebulizer BID \par - continue to use Incruse 1 inhale QAM \par - continue Breo Ellipta 200 mcg at 1 inhalation QD - rinse and gargle post use \par - Patient received Tezspire Sub-Q in the RLQ of abdomen; next dose in 4 weeks. \par \par 2. chronic bronchitis and mucus impaction:\par - Continue Mucomyst QiD 2cc 10% q8h\par - continue to use acapella device TID. \par - continue to use chest vest therapy BID-TID.\par \par 3. GERD\par -continue to use Protonix 40 mg before breakfast\par -continue Baclofen 10 mg q-meal\par \par 4.  Anemia\par -Advised to follow-up with his PCP/hematology oncology\par \par 5.  Gait instability\par -Add PT referral\par \par Patient to follow up in 1 month with RN for next Tezspire and with Dr. Allison as scheduled for 08/01/23.\par Patient to call with further questions and concerns.\par Patient verbalizes understanding of care plan and is agreeable. \par

## 2023-07-04 NOTE — ED PROVIDER NOTE - CADM POA CENTRAL LINE
NO anemia/ infection. Normal kidney/ liver function. Cholesterol is better from a year ago. Uric acid is normal so foot pain is not caused by gout. Prostate level is normal so urinary frequency is not related to prostate. Still may want to consider doing an exam of it or going to see urology since you are going to the bathroom so much. No

## 2023-07-06 ENCOUNTER — OUTPATIENT (OUTPATIENT)
Dept: OUTPATIENT SERVICES | Facility: HOSPITAL | Age: 73
LOS: 1 days | Discharge: HOME | End: 2023-07-06

## 2023-07-06 DIAGNOSIS — H05.352 EXOSTOSIS OF LEFT ORBIT: Chronic | ICD-10-CM

## 2023-07-06 DIAGNOSIS — Z98.89 OTHER SPECIFIED POSTPROCEDURAL STATES: Chronic | ICD-10-CM

## 2023-07-06 DIAGNOSIS — Z96.653 PRESENCE OF ARTIFICIAL KNEE JOINT, BILATERAL: Chronic | ICD-10-CM

## 2023-07-06 DIAGNOSIS — K62.5 HEMORRHAGE OF ANUS AND RECTUM: Chronic | ICD-10-CM

## 2023-07-06 DIAGNOSIS — Z98.890 OTHER SPECIFIED POSTPROCEDURAL STATES: Chronic | ICD-10-CM

## 2023-07-06 DIAGNOSIS — C18.9 MALIGNANT NEOPLASM OF COLON, UNSPECIFIED: ICD-10-CM

## 2023-07-06 DIAGNOSIS — Z87.09 PERSONAL HISTORY OF OTHER DISEASES OF THE RESPIRATORY SYSTEM: Chronic | ICD-10-CM

## 2023-07-10 ENCOUNTER — RESULT REVIEW (OUTPATIENT)
Age: 73
End: 2023-07-10

## 2023-07-10 ENCOUNTER — APPOINTMENT (OUTPATIENT)
Dept: HEMATOLOGY ONCOLOGY | Facility: CLINIC | Age: 73
End: 2023-07-10

## 2023-07-10 ENCOUNTER — APPOINTMENT (OUTPATIENT)
Dept: HEMATOLOGY ONCOLOGY | Facility: CLINIC | Age: 73
End: 2023-07-10
Payer: MEDICARE

## 2023-07-10 VITALS
TEMPERATURE: 97.2 F | RESPIRATION RATE: 16 BRPM | SYSTOLIC BLOOD PRESSURE: 137 MMHG | HEART RATE: 82 BPM | OXYGEN SATURATION: 97 % | WEIGHT: 143.3 LBS | DIASTOLIC BLOOD PRESSURE: 85 MMHG | BODY MASS INDEX: 22.44 KG/M2

## 2023-07-10 LAB
ANISOCYTOSIS BLD QL: SLIGHT — SIGNIFICANT CHANGE UP
BASOPHILS # BLD AUTO: 0.04 K/UL — SIGNIFICANT CHANGE UP (ref 0–0.2)
BASOPHILS NFR BLD AUTO: 0.4 % — SIGNIFICANT CHANGE UP (ref 0–2)
DACRYOCYTES BLD QL SMEAR: SLIGHT — SIGNIFICANT CHANGE UP
EOSINOPHIL # BLD AUTO: 0.01 K/UL — SIGNIFICANT CHANGE UP (ref 0–0.5)
EOSINOPHIL NFR BLD AUTO: 0.1 % — SIGNIFICANT CHANGE UP (ref 0–6)
HCT VFR BLD CALC: 36.4 % — SIGNIFICANT CHANGE UP (ref 34.5–45)
HGB BLD-MCNC: 10.9 G/DL — LOW (ref 11.5–15.5)
HYPOCHROMIA BLD QL: SLIGHT — SIGNIFICANT CHANGE UP
IMM GRANULOCYTES NFR BLD AUTO: 1.6 % — HIGH (ref 0–0.9)
LYMPHOCYTES # BLD AUTO: 0.68 K/UL — LOW (ref 1–3.3)
LYMPHOCYTES # BLD AUTO: 6.2 % — LOW (ref 13–44)
MCHC RBC-ENTMCNC: 23.3 PG — LOW (ref 27–34)
MCHC RBC-ENTMCNC: 29.9 G/DL — LOW (ref 32–36)
MCV RBC AUTO: 77.9 FL — LOW (ref 80–100)
MICROCYTES BLD QL: SLIGHT — SIGNIFICANT CHANGE UP
MONOCYTES # BLD AUTO: 0.57 K/UL — SIGNIFICANT CHANGE UP (ref 0–0.9)
MONOCYTES NFR BLD AUTO: 5.2 % — SIGNIFICANT CHANGE UP (ref 2–14)
NEUTROPHILS # BLD AUTO: 9.52 K/UL — HIGH (ref 1.8–7.4)
NEUTROPHILS NFR BLD AUTO: 86.5 % — HIGH (ref 43–77)
NRBC # BLD: 0 /100 WBCS — SIGNIFICANT CHANGE UP (ref 0–0)
PLAT MORPH BLD: NORMAL — SIGNIFICANT CHANGE UP
PLATELET # BLD AUTO: 198 K/UL — SIGNIFICANT CHANGE UP (ref 150–400)
POIKILOCYTOSIS BLD QL AUTO: SLIGHT — SIGNIFICANT CHANGE UP
POLYCHROMASIA BLD QL SMEAR: SLIGHT — SIGNIFICANT CHANGE UP
RBC # BLD: 4.67 M/UL — SIGNIFICANT CHANGE UP (ref 3.8–5.2)
RBC # FLD: 23.3 % — HIGH (ref 10.3–14.5)
RBC BLD AUTO: ABNORMAL
RETICS #: 234.1 K/UL — HIGH (ref 25–125)
RETICS/RBC NFR: 5 % — HIGH (ref 0.5–2.5)
SCHISTOCYTES BLD QL AUTO: SIGNIFICANT CHANGE UP
WBC # BLD: 11 K/UL — HIGH (ref 3.8–10.5)
WBC # FLD AUTO: 11 K/UL — HIGH (ref 3.8–10.5)

## 2023-07-10 PROCEDURE — 99214 OFFICE O/P EST MOD 30 MIN: CPT

## 2023-07-10 NOTE — REVIEW OF SYSTEMS
[Negative] : Allergic/Immunologic [FreeTextEntry6] : chronic cough - has been better [FreeTextEntry8] : burning [de-identified] : She walks without cane; gait off balance.

## 2023-07-10 NOTE — PHYSICAL EXAM
[Ambulatory and capable of all self care but unable to carry out any work activities] : Status 2- Ambulatory and capable of all self care but unable to carry out any work activities. Up and about more than 50% of waking hours [Thin] : thin [Normal] : affect appropriate [de-identified] : Left eye enucleation [de-identified] : mild wheeze; upper airway sounds transmitted.  [FreeTextEntry1] : Stoma is functioning well. Small opening at anal site, and no discharge.  Small 0.8 cm X 0.8 cm raised lesion proximal to anal canal located ~ 3-4 cm proximal to anal canal. [de-identified] : non-focal

## 2023-07-11 LAB
ALBUMIN SERPL ELPH-MCNC: 4.5 G/DL
ALP BLD-CCNC: 90 U/L
ALT SERPL-CCNC: 20 U/L
ANION GAP SERPL CALC-SCNC: 15 MMOL/L
AST SERPL-CCNC: 40 U/L
BILIRUB SERPL-MCNC: 0.4 MG/DL
BUN SERPL-MCNC: 12 MG/DL
CALCIUM SERPL-MCNC: 9.2 MG/DL
CEA SERPL-MCNC: 3.8 NG/ML
CHLORIDE SERPL-SCNC: 102 MMOL/L
CO2 SERPL-SCNC: 22 MMOL/L
CREAT SERPL-MCNC: 0.7 MG/DL
EGFR: 91 ML/MIN/1.73M2
FERRITIN SERPL-MCNC: 53 NG/ML
FOLATE SERPL-MCNC: >20 NG/ML
GLUCOSE SERPL-MCNC: 174 MG/DL
IRON SERPL-MCNC: 45 UG/DL
POTASSIUM SERPL-SCNC: 3.9 MMOL/L
PROT SERPL-MCNC: 6.4 G/DL
SODIUM SERPL-SCNC: 140 MMOL/L
VIT B12 SERPL-MCNC: 996 PG/ML

## 2023-07-11 NOTE — PATIENT PROFILE ADULT - LEGAL HELP
July 11, 2023     Patient: Rikki Regan   YOB: 2001   Date of Visit: 7/11/2023       To Whom It May Concern:    Please excuse Rikki Regan from work 7/11/2023 through 7/12/2023. Please allow to wear the cam boot at work.          Sincerely,        Brian Javed PA-C no

## 2023-07-13 LAB
ALBUMIN MFR SERPL ELPH: 60.5 %
ALBUMIN SERPL-MCNC: 3.9 G/DL
ALBUMIN/GLOB SERPL: 1.6 RATIO
ALPHA1 GLOB MFR SERPL ELPH: 6.6 %
ALPHA1 GLOB SERPL ELPH-MCNC: 0.4 G/DL
ALPHA2 GLOB MFR SERPL ELPH: 10.3 %
ALPHA2 GLOB SERPL ELPH-MCNC: 0.7 G/DL
B-GLOBULIN MFR SERPL ELPH: 16.5 %
B-GLOBULIN SERPL ELPH-MCNC: 1.1 G/DL
DEPRECATED KAPPA LC FREE/LAMBDA SER: 2.01 RATIO
GAMMA GLOB FLD ELPH-MCNC: 0.4 G/DL
GAMMA GLOB MFR SERPL ELPH: 6.1 %
IGA SER QL IEP: 421 MG/DL
IGG SER QL IEP: 376 MG/DL
IGM SER QL IEP: 68 MG/DL
INTERPRETATION SERPL IEP-IMP: NORMAL
KAPPA LC CSF-MCNC: 0.91 MG/DL
KAPPA LC SERPL-MCNC: 1.83 MG/DL
M PROTEIN MFR SERPL ELPH: 4 %
M PROTEIN SPEC IFE-MCNC: NORMAL
MONOCLON BAND OBS SERPL: 0.3 G/DL
PROT SERPL-MCNC: 6.4 G/DL
PROT SERPL-MCNC: 6.4 G/DL

## 2023-07-14 ENCOUNTER — NON-APPOINTMENT (OUTPATIENT)
Age: 73
End: 2023-07-14

## 2023-07-15 ENCOUNTER — NON-APPOINTMENT (OUTPATIENT)
Age: 73
End: 2023-07-15

## 2023-07-18 ENCOUNTER — APPOINTMENT (OUTPATIENT)
Dept: INTERNAL MEDICINE | Facility: CLINIC | Age: 73
End: 2023-07-18
Payer: MEDICARE

## 2023-07-18 VITALS — DIASTOLIC BLOOD PRESSURE: 70 MMHG | SYSTOLIC BLOOD PRESSURE: 110 MMHG

## 2023-07-18 DIAGNOSIS — T14.8XXA OTHER INJURY OF UNSPECIFIED BODY REGION, INITIAL ENCOUNTER: ICD-10-CM

## 2023-07-18 PROCEDURE — 99212 OFFICE O/P EST SF 10 MIN: CPT

## 2023-07-18 NOTE — HISTORY OF PRESENT ILLNESS
[de-identified] : RTC to follow up on hematoma of RUE.\par Has since resolved, and has no pain. \par Had episode of hypoglycemia (25) followed by hyperglycemia (500).  Plans to follow up with endocrine.\par Needs blood drawn for hematology; asks if they can be done here. \par

## 2023-07-19 LAB
APTT BLD: 29.8 SEC
ESTIMATED AVERAGE GLUCOSE: 174 MG/DL
HAPTOGLOB SERPL-MCNC: <20 MG/DL
HBA1C MFR BLD HPLC: 7.7 %
INR PPP: 1.24 RATIO
LDH SERPL-CCNC: 1349 U/L
PT BLD: 14.7 SEC

## 2023-07-20 LAB — EPO SERPL-MCNC: 39.1 MIU/ML

## 2023-07-21 ENCOUNTER — APPOINTMENT (OUTPATIENT)
Dept: COLORECTAL SURGERY | Facility: CLINIC | Age: 73
End: 2023-07-21
Payer: MEDICARE

## 2023-07-21 PROCEDURE — 99212 OFFICE O/P EST SF 10 MIN: CPT

## 2023-07-21 RX ORDER — NEBULIZER
EACH MISCELLANEOUS
Qty: 1 | Refills: 0 | Status: ACTIVE | OUTPATIENT
Start: 2023-07-21

## 2023-07-24 ENCOUNTER — RESULT REVIEW (OUTPATIENT)
Age: 73
End: 2023-07-24

## 2023-07-24 ENCOUNTER — APPOINTMENT (OUTPATIENT)
Dept: PULMONOLOGY | Facility: CLINIC | Age: 73
End: 2023-07-24
Payer: MEDICARE

## 2023-07-24 ENCOUNTER — NON-APPOINTMENT (OUTPATIENT)
Age: 73
End: 2023-07-24

## 2023-07-24 VITALS
HEIGHT: 67 IN | SYSTOLIC BLOOD PRESSURE: 120 MMHG | OXYGEN SATURATION: 98 % | RESPIRATION RATE: 18 BRPM | WEIGHT: 143 LBS | HEART RATE: 95 BPM | BODY MASS INDEX: 22.44 KG/M2 | TEMPERATURE: 98.8 F | DIASTOLIC BLOOD PRESSURE: 85 MMHG

## 2023-07-24 DIAGNOSIS — R09.3 ABNORMAL SPUTUM: ICD-10-CM

## 2023-07-24 DIAGNOSIS — A49.8 OTHER BACTERIAL INFECTIONS OF UNSPECIFIED SITE: ICD-10-CM

## 2023-07-24 DIAGNOSIS — A49.02 METHICILLIN RESISTANT STAPHYLOCOCCUS AUREUS INFECTION, UNSPECIFIED SITE: ICD-10-CM

## 2023-07-24 PROCEDURE — 99214 OFFICE O/P EST MOD 30 MIN: CPT | Mod: 25

## 2023-07-24 PROCEDURE — 71045 X-RAY EXAM CHEST 1 VIEW: CPT | Mod: 26

## 2023-07-24 PROCEDURE — 96372 THER/PROPH/DIAG INJ SC/IM: CPT

## 2023-07-24 RX ORDER — TEZEPELUMAB-EKKO 210 MG/1.9ML
210 INJECTION, SOLUTION SUBCUTANEOUS
Qty: 0 | Refills: 0 | Status: COMPLETED | OUTPATIENT
Start: 2023-07-23

## 2023-07-24 NOTE — REVIEW OF SYSTEMS
[Fatigue] : fatigue [Cough] : cough [Sputum] : sputum [Dyspnea] : dyspnea [Wheezing] : wheezing [SOB on Exertion] : sob on exertion [Negative] : Psychiatric [Diabetes] : diabetes [Fever] : no fever [Recent Wt Gain (___ Lbs)] : ~T no recent weight gain [EDS] : no EDS [Chills] : no chills [Poor Appetite] : no poor appetite [Recent Wt Loss (___ Lbs)] : ~T no recent weight loss [Hemoptysis] : no hemoptysis [Chest Tightness] : no chest tightness [Frequent URIs] : no frequent URIs [Pleuritic Pain] : no pleuritic pain [A.M. Dry Mouth] : no a.m. dry mouth [TextBox_122] : legs weak, feels like she is wearing shoes when is not [TextBox_144] : needs a new endo MD

## 2023-07-24 NOTE — PROCEDURE
[FreeTextEntry1] : CXR: Interpreted by Dr. Allison\par -No acute pulmonary disease\par -chronic changes on left\par

## 2023-07-24 NOTE — REASON FOR VISIT
[Acute] : an acute visit [Asthma] : asthma [Cough] : cough [Shortness of Breath] : shortness of breath [Family Member] : family member [TextBox_44] : increased mucus production

## 2023-07-24 NOTE — HISTORY OF PRESENT ILLNESS
[TextBox_4] : LAST VISIT 6/22/2023:\par Ms. Bloom is a 72 year old female with a history of abnormal CXR/chest CT, allergic rhinitis, severe persistent asthma, bronchiectasis, GERD, COPD, recurrent PNA, PND, s/p tracheoplasty for TBM, Covid-19 infection 11/2022, and SOB presenting to the office today for a hospital follow up visit. She is accompanied by her daughter.\par \par Her chief concern is recent hospitalization..\par \par Patient was admitted to [facility] from 6/5/2023 - 6/16/2023 for shortness of breath and elevated blood sugar.  She states she was discharged home with with an IV line in her right arm which caused hematoma on her right upper extremity.  Patient had to go back to [facility] for IV line in left arm to continue IV antibiotics.  Patient is currently on Medrol taper dosage.  She states she is compliant with the nebulizers and inhalers and feels that shortness of breath and cough has improved.\par Patient recently had blood work with her PCP and was noted to have a hemoglobin of 10.9.  She states she is not aware that she is anemic but will follow-up with PCP and hematology.\par Daughter is requesting physical therapy for patient.  Patient has been in and out of hospital since beginning of this year and is not steady on her feet.\par \par Patient denies fever, chills, SOB @ rest, wheezing, nasal congestion, runny nose, PND, or heartburn/reflux.\par \par \par VISIT TODAY 7/24/2023:\par Pt is here for Tezspire injection as well as a sick visit. \par She reports for the last couple weeks she has been dealing with pus like mucus.\par Sputum cultures came back positive for MRSA and Pseudomonas.\par She is currently not on inhaled Aki but is awaiting arrival of that medication.\par \par She also is now dealing with extreme shortness of breath with exertion, weakness, fatigue, experiencing issues with balance and overall not feeling well.  She cannot tell me how long this has been going on.  But she does feel as though this has been worsening in the last few days.\par \par She denies fevers, chills, sore throat, sweats, ear pain.\par \par She reports she is compliant on her medications.  After going through the medication list, she is not using Incruse at this time.\par She is nebulizing with budesonide twice a day, using her albuterol 4 times a day, and is using Mucomyst 2 times a day.\par \par Reports she is being worked up for blood loss–they are unsure where this is coming from.  Her last hemoglobin was 10.9.  Iron studies have been done based on chart results.\par \par She reports he went to the emergency room 2 weeks ago due to feeling unwell as well–she got IV fluids there.\par \par She is currently not undergoing PT due to no more coverage from Medicare for this.  She is looking into Medicaid with her daughter.\par She reports she has been falling a lot–she is able to catch herself but is losing her balance.\par \par She needs a new endo MD- she cannot follow up with her prior doc. Has one sched for Oct w/Prabha- Dr. Panchal.

## 2023-07-24 NOTE — ASSESSMENT
[FreeTextEntry1] : Ms. Bloom is a 72 year old female with a history of abnormal CXR/chest CT, allergic rhinitis, severe persistent asthma, bronchiectasis, GERD, COPD, recurrent PNA, PND, s/p tracheoplasty for TBM, Covid-19 infection 11/2022, and SOB presenting to the office today for routine tezspire injection but acute visit due to cough, mucus and SOB.\par CXR was within normal limits for the patient. \par Repeat labs were recommended to the patient- recheck Hgb levels, Ddimer, CMP, pt declined at this time. \par I advised if the patient continued to deteriorate despite new plan- she needs to report to the emergency room.\par \par 1. Severe persistent asthma -(steroid dependent):\par - increase medrol to 24 mg x 5d, 20 mg x 5 days, 16 mg x 5 days, 12 mg x 5 days then back to 8mg dose. Pt to keep us posted. \par - continue DuoNeb via nebulizer, Q6H. \par - continue Budesonide 0.5% via the nebulizer BID- can now increase to TID\par - continue Breo Ellipta 200 mcg at 1 inhalation QD - rinse and gargle post use \par -restart incruse 1 inhalation daily rinse and gargle (had not been using)\par - Patient received Tezspire Sub-Q today\par \par 2. chronic bronchitis and mucus impaction:\par - Continue Mucomyst QiD 2cc 10%- advised patient to increase to TID for now\par - continue to use acapella device TID. \par - continue to use chest vest therapy BID-TID.\par \par 3.Pseudomonas infection and MRSA infection/abnormal sputum color and amount\par -awaiting inhaled TYLER from [facility] care (tasked nursing to look into this order)\par -add Doxycycline 100 mg PO BID x 10 days to get a jumpstart on the MRSA infection\par -likely dealing with systemic side effects from this- fatigue/malaise/SOB\par \par 4. GERD\par -continue to use Protonix 40 mg before breakfast\par -continue Baclofen 10 mg q-meal\par \par 5.  Anemia\par -Advised to follow-up with his PCP/hematology oncology\par \par 6.  Gait instability\par -Add PT referral, pt now looking to see if Medicaid would cover this cost\par \par 7.Endo eval need\par -pt reports she is running out of insulin and needs a new endocrinologist- we can try [facility] to refer her\par \par \par Patient to follow up as scheduled with Dr. Allison next week\par Patient verbalizes understanding of care plan and is agreeable. \par

## 2023-07-24 NOTE — PHYSICAL EXAM
[No Acute Distress] : no acute distress [Normal Oropharynx] : normal oropharynx [Normal Appearance] : normal appearance [Normal Rate/Rhythm] : normal rate/rhythm [Normal S1, S2] : normal s1, s2 [No Abnormalities] : no abnormalities [Normal Color/ Pigmentation] : normal color/ pigmentation [No Focal Deficits] : no focal deficits [Oriented x3] : oriented x3 [Normal Affect] : normal affect [Supple] : supple [No Neck Mass] : no neck mass [No JVD] : no jvd [No Resp Distress] : no resp distress [Gait - Sufficient For Exercise Testing] : gait sufficient for exercise testing [No Clubbing] : no clubbing [No Cyanosis] : no cyanosis [No Edema] : no edema [TextBox_68] : ramon LAO

## 2023-07-27 ENCOUNTER — APPOINTMENT (OUTPATIENT)
Dept: CARDIOLOGY | Facility: CLINIC | Age: 73
End: 2023-07-27
Payer: MEDICARE

## 2023-07-27 ENCOUNTER — APPOINTMENT (OUTPATIENT)
Dept: ELECTROPHYSIOLOGY | Facility: CLINIC | Age: 73
End: 2023-07-27
Payer: MEDICARE

## 2023-07-27 ENCOUNTER — NON-APPOINTMENT (OUTPATIENT)
Age: 73
End: 2023-07-27

## 2023-07-27 VITALS
DIASTOLIC BLOOD PRESSURE: 81 MMHG | WEIGHT: 143 LBS | HEIGHT: 67 IN | HEART RATE: 88 BPM | SYSTOLIC BLOOD PRESSURE: 144 MMHG | OXYGEN SATURATION: 100 % | BODY MASS INDEX: 22.44 KG/M2

## 2023-07-27 DIAGNOSIS — I10 ESSENTIAL (PRIMARY) HYPERTENSION: ICD-10-CM

## 2023-07-27 PROCEDURE — 93000 ELECTROCARDIOGRAM COMPLETE: CPT

## 2023-07-27 PROCEDURE — 99214 OFFICE O/P EST MOD 30 MIN: CPT

## 2023-07-27 NOTE — HISTORY OF PRESENT ILLNESS
[FreeTextEntry1] : [place] Bloom is a 71y/o woman with Hx of tracheobronchomalacia s/p tracheobronchoplasty, lung nodules, severe allergic asthma, adrenal insufficiency, bronchiectasis, DM II, HTN, CRC s/p colostomy, mod-severe AI, paroxysmal afib, on Eliquis, and frequent PVCs, on mexiletine, who presents today for routine f/u. On prior visit she had been hospitalized for osteomyelitis and discharged to Rehab for IV antibiotics via left chest wall mediport. When hospitalized, course c/b anaphylaxis 2/2 cefepime administration as well as episode of Vtach following epi administration requiring lidocaine for . She spent -2022 in MICU intubated and sedated, and was \par successfully extubated on 22. R foot MRI concerning for osteomyelitis. On 22 she underwent R foot I&D and wound debridement by podiatry team. Since that time, she is now struggling with PNA (+pseudomonas and MRSA) being treated by Pulmonary. Has been on mexiletine 150 BID on prior visit (was taken off in Rehab) with improvement in palpitations and no recent PVCs. Denies chest pain, palpitations, syncope or near syncope.

## 2023-07-27 NOTE — CARDIOLOGY SUMMARY
[de-identified] : 11/2021: CONCLUSIONS:\par 1. Mitral annular calcification, otherwise normal mitral\par valve. Minimal mitral regurgitation.\par 2. Calcified trileaflet aortic valve with normal opening.\par Moderate-severe aortic regurgitation.\par 3. Mild left atrial enlargement.\par 4. Normal left ventricular internal dimensions and wall\par thicknesses.\par 5. Endocardium not well visualized; grossly normal left\par ventricular systolic function.\par 6. Mild diastolic dysfunction (Stage I).\par 7. The right ventricle is not well visualized; grossly\par normal right ventricular systolic function.\par 8. Normal tricuspid valve. Minimal tricuspid regurgitation.\par 9. Estimated pulmonary artery systolic pressure equals 26\par mm Hg, assuming right atrial pressure equals 10  mm Hg,\par consistent with normal pulmonary pressures.\par *** Compared with echocardiogram of 4/23/2021, no\par significant changes noted.

## 2023-07-28 RX ORDER — BLOOD-GLUCOSE METER
KIT MISCELLANEOUS
Qty: 3 | Refills: 5 | Status: ACTIVE | COMMUNITY
Start: 2023-07-28 | End: 1900-01-01

## 2023-07-28 RX ORDER — INSULIN LISPRO 100 [IU]/ML
100 INJECTION, SOLUTION INTRAVENOUS; SUBCUTANEOUS
Qty: 1 | Refills: 2 | Status: ACTIVE | COMMUNITY
Start: 2023-05-24 | End: 1900-01-01

## 2023-07-28 NOTE — HISTORY OF PRESENT ILLNESS
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  Patient has multiple medical problems including severe asthma.  Within the last few years she has had multiple issues including an unexplained bacteremia, aortic dissection requiring surgery, issues with her ventricle, etc. etc.  After an extended stay in rehab she is actually doing quite well still having occasional exacerbations of her asthma.\par \par She presents here today simply for routine follow-up related to her squamous cell carcinoma.  Patient is status post abdominoperineal resection.  Physical examination reveals a soft, nontender, nondistended abdomen.  Her specimen extraction site in the low midline is fully healed with no evidence of incisional hernia.  Her permanent left lower quadrant end colostomy is pink, viable and healthy in appearance.  She has a well adherent ostomy appliance in place.  Her perineal wound is fully healed with no evidence of drainage.  There is a small area of some dry skin in the intergluteal region but no significant pathology was noted.\par \par [facility] was encouraged that from my perspective all is well.  She will have a repeat CAT scan of the chest, abdomen and pelvis, scheduled precautionarily by her medical oncologist.

## 2023-08-01 ENCOUNTER — APPOINTMENT (OUTPATIENT)
Dept: PULMONOLOGY | Facility: CLINIC | Age: 73
End: 2023-08-01
Payer: MEDICARE

## 2023-08-01 ENCOUNTER — APPOINTMENT (OUTPATIENT)
Dept: HEMATOLOGY ONCOLOGY | Facility: CLINIC | Age: 73
End: 2023-08-01

## 2023-08-01 ENCOUNTER — APPOINTMENT (OUTPATIENT)
Dept: CT IMAGING | Facility: IMAGING CENTER | Age: 73
End: 2023-08-01
Payer: MEDICARE

## 2023-08-01 ENCOUNTER — OUTPATIENT (OUTPATIENT)
Dept: OUTPATIENT SERVICES | Facility: HOSPITAL | Age: 73
LOS: 1 days | End: 2023-08-01
Payer: MEDICARE

## 2023-08-01 VITALS
SYSTOLIC BLOOD PRESSURE: 148 MMHG | TEMPERATURE: 96.5 F | HEIGHT: 67 IN | RESPIRATION RATE: 16 BRPM | BODY MASS INDEX: 22.44 KG/M2 | HEART RATE: 87 BPM | DIASTOLIC BLOOD PRESSURE: 80 MMHG | OXYGEN SATURATION: 98 % | WEIGHT: 143 LBS

## 2023-08-01 DIAGNOSIS — C21.1 MALIGNANT NEOPLASM OF ANAL CANAL: ICD-10-CM

## 2023-08-01 DIAGNOSIS — Z98.890 OTHER SPECIFIED POSTPROCEDURAL STATES: Chronic | ICD-10-CM

## 2023-08-01 DIAGNOSIS — Z98.89 OTHER SPECIFIED POSTPROCEDURAL STATES: Chronic | ICD-10-CM

## 2023-08-01 DIAGNOSIS — Z96.653 PRESENCE OF ARTIFICIAL KNEE JOINT, BILATERAL: Chronic | ICD-10-CM

## 2023-08-01 DIAGNOSIS — R09.82 POSTNASAL DRIP: ICD-10-CM

## 2023-08-01 DIAGNOSIS — H05.352 EXOSTOSIS OF LEFT ORBIT: Chronic | ICD-10-CM

## 2023-08-01 PROCEDURE — 74177 CT ABD & PELVIS W/CONTRAST: CPT

## 2023-08-01 PROCEDURE — 71260 CT THORAX DX C+: CPT

## 2023-08-01 PROCEDURE — 94010 BREATHING CAPACITY TEST: CPT

## 2023-08-01 PROCEDURE — 99214 OFFICE O/P EST MOD 30 MIN: CPT | Mod: 25

## 2023-08-01 PROCEDURE — 74177 CT ABD & PELVIS W/CONTRAST: CPT | Mod: 26,MH

## 2023-08-01 PROCEDURE — 94727 GAS DIL/WSHOT DETER LNG VOL: CPT

## 2023-08-01 PROCEDURE — ZZZZZ: CPT

## 2023-08-01 PROCEDURE — 95012 NITRIC OXIDE EXP GAS DETER: CPT

## 2023-08-01 PROCEDURE — 94729 DIFFUSING CAPACITY: CPT

## 2023-08-01 PROCEDURE — 71260 CT THORAX DX C+: CPT | Mod: 26,MH

## 2023-08-01 NOTE — ADDENDUM
[FreeTextEntry1] : Documented by Joy Bello acting as a scribe for Dr. Eulalio Allison on 08/01/2023. All medical record entries made by the Scribe were at my, Dr. Eulalio Allison's, direction and personally dictated by me on 08/01/2023. I have reviewed the chart and agree that the record accurately reflects my personal performance of the history, physical exam, assessment and plan. I have also personally directed, reviewed, and agree with the discharge instructions.

## 2023-08-01 NOTE — ASSESSMENT
[FreeTextEntry1] : Ms. Bloom is a 72 year old female with a history of mm, rectal CA, AVDz, asthma, allergy, GERD, TBM, s/p multiple pneumonias, who presents s/p TAA repair/ sepsis/ PNA- mild sob/ mucus production c/w active severe persistent asthma present; weakness  Her chronic SOB which is multifactorial due to: - tracheomalacia (s/p tracheoplasty with residual frequent mucus production, though patient is non-compliant with vest therapy) - chronic bronchitis - asthma - allergic rhinitis - GERD - poor breathing mechanics  - CAD/AV disease, arrhythmia, electrolyte issue  problem 1a: severe persistent asthma -(steroid dependent) - active  -Boost Medrol 8mg/day to 32 mg/day x 1 week- taper as able  -recommended to use POWERbreathe Medic Plus IMT (30 reps, 2X per day)  -recommended to use the Breather -continue to use albuterol via the nebulizer QID or DuoNeb via nebulizer, Q6H  -followed by Mucomyst QiD 2cc 10% q8h -followed by the acapella device/ chest vest therapy  -(1st) followed by Perforomist via the nebulizer BID -(2nd) followed by Budesonide 0.5% via the nebulizer BID  -continue to use Breo Ellipta 200 at 1 inhalation QD  -continue to use Spiriva 1 inhalation QD -failed Xolair 225 injection; follow up injections every 2 weeks - Dupixent initiated (12.3.19) -off since 3/2022 - Initiate Tezspire 8/2022 -continue to use Accolate 20 mg BID -Information sheet given about prednisone to the patient to be reviewed, this medication is never to be used without consulting the prescribing physician. Proper dietary restraint is necessary specifically salt containing foods, if any reaction may occur should be reported.  -Asthma is believed to be caused by inherited (genetic) and environmental factor, but its exact cause is unknown. Asthma may be triggered by allergens, lung infections, or irritants in the air. Asthma triggers are different for each person -Inhaler technique reviewed as well as oral hygiene techniques reviewed with patient. Avoidance of cold air, extremes of temperature, rescue inhaler should be used before exercise. Order of medication reviewed with patient. Recommended use of a cool mist humidifier in the bedroom.   problem 2: tracheomalacia, residual bronchomalacia  -s/p tracheoplasty with Dr. Mt Zapien -laser Bronchoscopy pending -continue to follow up  -s/p f/u Dynamic chest CT 10/2019 positive w TBM - repeat PRN -Tracheomalacia is usually acquired in adults and common causes include damage by tracheostomy or endotracheal intubation damaging the tracheal cartilage with increase risk with multiple intubations, prolonged intubation, and concurrent high dose steroid therapy; external chest wall trauma and surgery; chronic compression of the trachea by benign etiologies (eg, benign mediastinal goiter) or malignancy; relapsing polychondritis; or recurrent infection. Tracheomalacia can be asymptomatic, however signs or symptoms can develop as the severity of the airway narrowing progresses with major symptoms include dyspnea, cough, and sputum retention. Other symptoms include severe paroxysms of coughing, wheezing or stridor, barking cough and may be exacerbated by forced expiration, cough, and Valsalva maneuver. Tracheomalacia is diagnosed by a bronchoscopic visualization of dynamic airway collapse on dynamic chest CT. Therapy is warranted in symptomatic patients with severe tracheomalacia and includes surgical repair as tracheobronchoplasty. The patient was referred to Dr. Valentino Nguyen or Dr. Abrahan Armando at [facility] for a surgical consult.   problem 3: chronic bronchitis and mucus clearance -continue to use acapella device multiple times daily -recommended to use chest vest therapy multiple times daily  -she is being sent for sputum cultures  Patient has a chronic cough greater than 6 months, tried and failed manual chest physiotherapy at home, no skilled caregiver available at home to perform manual CPT, tried and failed acapella vibratory physiotherapy, and recommended chest vest therapy   Problem 3A: Multiple infections ?Active (1/31/2022)  -off Tobramycin BID for 1 month (completed 12/20/19) -Complete follow up Sputum culture after completing ABx if needed. (resend)   Problem 3B: multi myeloma -appointment  on 1/28/2020  problem 4: GERD -continue to use Protonix 40 mg before breakfast -continue Baclofen 10 mg q-meal -Rule of 2s: avoid eating too much, eating too late, eating too spicy, eating two hours before bed -Things to avoid including overeating, spicy foods, tight clothing, eating within three hours of bed, this list is not all inclusive.  -For treatment of reflux, possible options discussed including diet control, H2 blockers, PPIs, as well as coating motility agents discussed as treatment options. Timing of meals and proximity of last meal to sleep were discussed. If symptoms persist, a formal gastrointestinal evaluation is needed.   problem 5: allergic rhinitis  -continue to use nasal saline -continue to use Xyzal 5 mg before bed -Environmental measures for allergies were encouraged including mattress and pillow cover, air purifier, and environmental controls.   problem 6: hx of abnormal aortic valve / cardiac health - arrhythmia  -continue to follow up with Dr. Leahy, AV Disease, AF -echocardiogram in June 2018  (Tosin); Kale Tristan for f/u, Ismail  problem 7: colon cancer -s/p radiation therapy, surgery  -continue to use chemotherapy follow up with Dr. King or Dr. Luong (CT pending)  problem 8: poor breathing mechanics -Recommended Wim Hof and Buteyko breathing techniques  -Proper breathing techniques were reviewed with an emphasis of exhalation. Patient instructed to breath in for 1 second and out for four seconds. Patient was encouraged to not talk while walking.  problem 9: immunodeficiency - Due to the fact that this pt has had more infections than would be expected and immunological blood work is indicated this would include: IgG subclasses, quantitative immunoglobulins, Strep pneumoniae titers as well as Vitamin D levels. Based on this blood work we will be able to decide where the pt needs additional pneumococcal vaccine either Prevnar 13 or pneumovax. Immunology evaluation will also be potentially indicated.  problem 10: r/o immunodeficiency (on Medrol) -Due to the fact that this pt has had more infections than would be expected and immunological blood work is indicated this would include: IgG subclasses, quantitative immunoglobulins, Strep pneumoniae titers as well as Vitamin D levels. -Based on this blood work we will be able to decide where the pt needs additional pneumococcal vaccine either Prevnar 13 or pneumovax. Immunology evaluation will also be potentially indicated.   problem 11: abnormal chest CT- ? new nodule- likely inflammation  - F/u PET/CT 4/2019- if changed Bx (Rupali); follow up and re-evaluate as per Rupali -questionable DIEUDONNE or HERMELINDO, all sputum is negative  -CAT scans are the only radiological modality to identify abnormalities w/in the lungs with regards to nodules/masses/lymph nodes. Risks, benefits were reviewed in detail. The guidelines for abnormalities include follow up CT scans at various intervals which could range from 6 weeks to 1 year intervals. If there is a change for the worse then consideration for a biopsy will be considered if you are a candidate. Second opinion evaluation with a thoracic surgeon or an interventional radiologist could be offered.   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 It sometimes develops following a URI, herpes zoster outbreak in pharynx or thyroid or cervical spine injury. However, many patients have no identifiable antecedent explanation.   Problem 14: Health Maintenance/COVID19 Precautions  -s/p  Moderna COVID 19 vaccine x 3 - Clean your hands often. Wash your hands often with soap and water for at least 20 seconds, especially after blowing your nose, coughing, or sneezing, or having been in a public place. - If soap and water are not available, use a hand  that contains at least 60% alcohol. - To the extent possible, avoid touching high-touch surfaces in public places - elevator buttons, door handles, handrails, handshaking with people, etc. Use a tissue or your sleeve to cover your hand or finger if you must touch something. - Wash your hands after touching surfaces in public places. - Avoid touching your face, nose, eyes, etc. - Clean and disinfect your home to remove germs: practice routine cleaning of frequently touched surfaces (for example: tables, doorknobs, light switches, handles, desks, toilets, faucets, sinks & cell phones) - Avoid crowds, especially in poorly ventilated spaces. Your risk of exposure to respiratory viruses like COVID-19 may increase in crowded, closed-in settings with little air circulation if there are people in the crowd who are sick. All patients are recommended to practice social distancing and stay at least 6 feet away from others.  - Avoid all non-essential travel including plane trips, and especially avoid embarking on cruise ships. -If COVID-19 is spreading in your community, take extra measures to put distance between yourself and other people to further reduce your risk of being exposed to this new virus. -Stay home as much as possible. - Consider ways of getting food brought to your house through family, social, or commercial networks -Be aware that the virus has been known to live in the air up to 3 hours post exposure, cardboard up to 24 hours post exposure, copper up to 4 hours post exposure, steel and plastic up to 2-3 days post exposure. Risk of transmission from these surfaces are affected by many variables. COVID-19 precautionary Immune Support Recommendations: -OTC Vitamin C 500mg BID  -OTC Quercetin 250-500mg BID  -OTC Zinc 75-100mg per day  -OTC Melatonin 1 or 2mg a night  -OTC Vitamin D 1-4000mg per day  -OTC Tonic Water 8oz per day -Water 0.5-1 gallon per day Asthma and COVID19: You need to make sure your asthma is under control. This often requires the use of inhaled corticosteroids (and sometimes oral corticosteroids). Inhaled corticosteroids do not likely reduce your immune system's ability to fight infections, but oral corticosteroids may. It is important to use the steps above to protect yourself to limit your exposure to any respiratory virus.   problem 15:  health maintenance  -s/p flu shot 10/30/2023 -6/2021 Shingeles: Valacyclovir in progress  -recommended strep pneumonia vaccines: Prevnar-13 vaccine, followed by Pneumo vaccine 23 one year following (completed) -recommended early intervention for URIs -recommended regular osteoporosis evaluations -recommended early dermatological evaluations -recommended after the age of 50 to the age of 70, colonoscopy every 5 years   F/P in 4-6 months SPI / NIOX She is encouraged to call with any changes, concerns, or questions.

## 2023-08-01 NOTE — PROCEDURE
[FreeTextEntry1] : Full PFT reveals mild restrictive and moderate obstructive dysfunction; FEV1 was 1.07L which is 50% of predicted; mildly reduced lung volumes; mild-moderately reduced diffusion at 11.3, which is 58% of predicted; normal flow volume loop. PFTs were performed to evaluate for SOB, asthma  FENO was 5; a normal value being less than 25 Fractional exhaled nitric oxide (FENO) is regarded as a simple, noninvasive method for assessing eosinophilic airway inflammation. Produced by a variety of cells within the lung, nitric oxide (NO) concentrations are generally low in healthy individuals. However, high concentrations of NO appear to be involved in nonspecific host defense mechanisms and chronic inflammatory diseases such as asthma. The American Thoracic Society (ATS) therefore has recommended using FENO to aid in the diagnosis and monitoring of eosinophilic airway inflammation and asthma, and for identifying steroid responsive individuals whose chronic respiratory symptoms may be caused by airway inflammation.

## 2023-08-01 NOTE — HISTORY OF PRESENT ILLNESS
[FreeTextEntry1] : Ms. Bloom is a 72 year old female with a history of abnormal CXR/chest CT, allergic rhinitis, severe persistent asthma, bronchiectasis, GERD, COPD, recurrent PNA, PND, s/p tracheoplasty, TBM, and SOB presenting to the office today for a follow up visit. Her chief complaint is  -she notes falling one week ago at home -she notes high blood sugar due to taking Medrol  -she notes KRAMER with walking -she notes feeling generally unwell -she notes severe wheezing -she notes nausea, which she controls with Zofran -she notes GERD Sx and nausea yesterday, which were improved with yogurt -she notes on Tezspire  -she denies any headaches, emesis, fever, chills, sweats, chest pain, chest pressure, coughing, palpitations, diarrhea, constipation, dysphagia, vertigo, leg swelling, itchy eyes, itchy ears, or sour taste in the mouth.

## 2023-08-01 NOTE — PHYSICAL EXAM
[No Acute Distress] : no acute distress [Normal Oropharynx] : normal oropharynx [III] : Mallampati Class: III [Normal Appearance] : normal appearance [No Neck Mass] : no neck mass [Normal Rate/Rhythm] : normal rate/rhythm [Murmur ___ / 6] : murmur [unfilled] / 6 [Normal S1, S2] : normal s1, s2 [No Resp Distress] : no resp distress [No Abnormalities] : no abnormalities [Benign] : benign [Normal Gait] : normal gait [No Clubbing] : no clubbing [No Cyanosis] : no cyanosis [FROM] : FROM [Normal Color/ Pigmentation] : normal color/ pigmentation [No Focal Deficits] : no focal deficits [Oriented x3] : oriented x3 [Normal Affect] : normal affect [TextBox_54] : 2/6 systolic murmur  [TextBox_68] : I:E 1:3;  expiratory wheezes b/l [TextBox_105] : venous stasis changes, Right leg LE wound

## 2023-08-02 NOTE — ED ADULT NURSE NOTE - NS ED NURSE LEVEL OF CONSCIOUSNESS SPEECH
----- Message from MAGO Lewis Speaking Coherently

## 2023-08-11 ENCOUNTER — INPATIENT (INPATIENT)
Facility: HOSPITAL | Age: 73
LOS: 29 days | Discharge: HOME | DRG: 266 | End: 2023-09-10
Attending: THORACIC SURGERY (CARDIOTHORACIC VASCULAR SURGERY) | Admitting: INTERNAL MEDICINE
Payer: MEDICARE

## 2023-08-11 VITALS
WEIGHT: 143.08 LBS | DIASTOLIC BLOOD PRESSURE: 65 MMHG | TEMPERATURE: 98 F | OXYGEN SATURATION: 100 % | RESPIRATION RATE: 22 BRPM | HEIGHT: 67 IN | SYSTOLIC BLOOD PRESSURE: 121 MMHG | HEART RATE: 90 BPM

## 2023-08-11 DIAGNOSIS — Z98.890 OTHER SPECIFIED POSTPROCEDURAL STATES: Chronic | ICD-10-CM

## 2023-08-11 DIAGNOSIS — Z98.89 OTHER SPECIFIED POSTPROCEDURAL STATES: Chronic | ICD-10-CM

## 2023-08-11 DIAGNOSIS — H05.352 EXOSTOSIS OF LEFT ORBIT: Chronic | ICD-10-CM

## 2023-08-11 DIAGNOSIS — Z96.653 PRESENCE OF ARTIFICIAL KNEE JOINT, BILATERAL: Chronic | ICD-10-CM

## 2023-08-11 DIAGNOSIS — Z29.9 ENCOUNTER FOR PROPHYLACTIC MEASURES, UNSPECIFIED: ICD-10-CM

## 2023-08-11 DIAGNOSIS — R65.10 SYSTEMIC INFLAMMATORY RESPONSE SYNDROME (SIRS) OF NON-INFECTIOUS ORIGIN WITHOUT ACUTE ORGAN DYSFUNCTION: ICD-10-CM

## 2023-08-11 DIAGNOSIS — R06.00 DYSPNEA, UNSPECIFIED: ICD-10-CM

## 2023-08-11 DIAGNOSIS — J45.901 UNSPECIFIED ASTHMA WITH (ACUTE) EXACERBATION: ICD-10-CM

## 2023-08-11 DIAGNOSIS — E11.9 TYPE 2 DIABETES MELLITUS WITHOUT COMPLICATIONS: ICD-10-CM

## 2023-08-11 DIAGNOSIS — K62.5 HEMORRHAGE OF ANUS AND RECTUM: Chronic | ICD-10-CM

## 2023-08-11 DIAGNOSIS — Z87.09 PERSONAL HISTORY OF OTHER DISEASES OF THE RESPIRATORY SYSTEM: Chronic | ICD-10-CM

## 2023-08-11 DIAGNOSIS — I48.91 UNSPECIFIED ATRIAL FIBRILLATION: ICD-10-CM

## 2023-08-11 LAB
ALBUMIN SERPL ELPH-MCNC: 4.3 G/DL — SIGNIFICANT CHANGE UP (ref 3.3–5)
ALP SERPL-CCNC: 86 U/L — SIGNIFICANT CHANGE UP (ref 40–120)
ALT FLD-CCNC: 18 U/L — SIGNIFICANT CHANGE UP (ref 10–45)
ANION GAP SERPL CALC-SCNC: 16 MMOL/L — SIGNIFICANT CHANGE UP (ref 5–17)
ANISOCYTOSIS BLD QL: SIGNIFICANT CHANGE UP
APPEARANCE UR: CLEAR — SIGNIFICANT CHANGE UP
APTT BLD: 28 SEC — SIGNIFICANT CHANGE UP (ref 24.5–35.6)
AST SERPL-CCNC: 36 U/L — SIGNIFICANT CHANGE UP (ref 10–40)
BACTERIA # UR AUTO: NEGATIVE — SIGNIFICANT CHANGE UP
BASOPHILS # BLD AUTO: 0.19 K/UL — SIGNIFICANT CHANGE UP (ref 0–0.2)
BASOPHILS NFR BLD AUTO: 1.8 % — SIGNIFICANT CHANGE UP (ref 0–2)
BILIRUB SERPL-MCNC: 0.6 MG/DL — SIGNIFICANT CHANGE UP (ref 0.2–1.2)
BILIRUB UR-MCNC: NEGATIVE — SIGNIFICANT CHANGE UP
BUN SERPL-MCNC: 14 MG/DL — SIGNIFICANT CHANGE UP (ref 7–23)
BURR CELLS BLD QL SMEAR: PRESENT — SIGNIFICANT CHANGE UP
CALCIUM SERPL-MCNC: 8.6 MG/DL — SIGNIFICANT CHANGE UP (ref 8.4–10.5)
CHLORIDE SERPL-SCNC: 101 MMOL/L — SIGNIFICANT CHANGE UP (ref 96–108)
CO2 SERPL-SCNC: 22 MMOL/L — SIGNIFICANT CHANGE UP (ref 22–31)
COLOR SPEC: YELLOW — SIGNIFICANT CHANGE UP
CREAT SERPL-MCNC: 0.8 MG/DL — SIGNIFICANT CHANGE UP (ref 0.5–1.3)
DACRYOCYTES BLD QL SMEAR: SLIGHT — SIGNIFICANT CHANGE UP
DIFF PNL FLD: ABNORMAL
EGFR: 77 ML/MIN/1.73M2 — SIGNIFICANT CHANGE UP
EOSINOPHIL # BLD AUTO: 0.1 K/UL — SIGNIFICANT CHANGE UP (ref 0–0.5)
EOSINOPHIL NFR BLD AUTO: 0.9 % — SIGNIFICANT CHANGE UP (ref 0–6)
EPI CELLS # UR: 1 /HPF — SIGNIFICANT CHANGE UP
FLUAV AG NPH QL: SIGNIFICANT CHANGE UP
FLUBV AG NPH QL: SIGNIFICANT CHANGE UP
GLUCOSE SERPL-MCNC: 272 MG/DL — HIGH (ref 70–99)
GLUCOSE UR QL: ABNORMAL
HCT VFR BLD CALC: 35.8 % — SIGNIFICANT CHANGE UP (ref 34.5–45)
HGB BLD-MCNC: 10 G/DL — LOW (ref 11.5–15.5)
HYALINE CASTS # UR AUTO: 1 /LPF — SIGNIFICANT CHANGE UP (ref 0–2)
HYPOCHROMIA BLD QL: SLIGHT — SIGNIFICANT CHANGE UP
INR BLD: 1.09 RATIO — SIGNIFICANT CHANGE UP (ref 0.85–1.18)
KETONES UR-MCNC: ABNORMAL
LEUKOCYTE ESTERASE UR-ACNC: ABNORMAL
LYMPHOCYTES # BLD AUTO: 1.31 K/UL — SIGNIFICANT CHANGE UP (ref 1–3.3)
LYMPHOCYTES # BLD AUTO: 12.4 % — LOW (ref 13–44)
MACROCYTES BLD QL: SLIGHT — SIGNIFICANT CHANGE UP
MANUAL SMEAR VERIFICATION: SIGNIFICANT CHANGE UP
MCHC RBC-ENTMCNC: 22.5 PG — LOW (ref 27–34)
MCHC RBC-ENTMCNC: 27.9 GM/DL — LOW (ref 32–36)
MCV RBC AUTO: 80.6 FL — SIGNIFICANT CHANGE UP (ref 80–100)
MICROCYTES BLD QL: SLIGHT — SIGNIFICANT CHANGE UP
MONOCYTES # BLD AUTO: 1.21 K/UL — HIGH (ref 0–0.9)
MONOCYTES NFR BLD AUTO: 11.5 % — SIGNIFICANT CHANGE UP (ref 2–14)
NEUTROPHILS # BLD AUTO: 7.75 K/UL — HIGH (ref 1.8–7.4)
NEUTROPHILS NFR BLD AUTO: 73.4 % — SIGNIFICANT CHANGE UP (ref 43–77)
NITRITE UR-MCNC: NEGATIVE — SIGNIFICANT CHANGE UP
OVALOCYTES BLD QL SMEAR: SLIGHT — SIGNIFICANT CHANGE UP
PH UR: 6.5 — SIGNIFICANT CHANGE UP (ref 5–8)
PLAT MORPH BLD: NORMAL — SIGNIFICANT CHANGE UP
PLATELET # BLD AUTO: 227 K/UL — SIGNIFICANT CHANGE UP (ref 150–400)
POIKILOCYTOSIS BLD QL AUTO: SIGNIFICANT CHANGE UP
POLYCHROMASIA BLD QL SMEAR: SLIGHT — SIGNIFICANT CHANGE UP
POTASSIUM SERPL-MCNC: 4.4 MMOL/L — SIGNIFICANT CHANGE UP (ref 3.5–5.3)
POTASSIUM SERPL-SCNC: 4.4 MMOL/L — SIGNIFICANT CHANGE UP (ref 3.5–5.3)
PROT SERPL-MCNC: 6.1 G/DL — SIGNIFICANT CHANGE UP (ref 6–8.3)
PROT UR-MCNC: ABNORMAL
PROTHROM AB SERPL-ACNC: 12 SEC — SIGNIFICANT CHANGE UP (ref 9.5–13)
RAPID RVP RESULT: SIGNIFICANT CHANGE UP
RBC # BLD: 4.44 M/UL — SIGNIFICANT CHANGE UP (ref 3.8–5.2)
RBC # FLD: 25.5 % — HIGH (ref 10.3–14.5)
RBC BLD AUTO: ABNORMAL
RBC CASTS # UR COMP ASSIST: 4 /HPF — SIGNIFICANT CHANGE UP (ref 0–4)
RSV RNA NPH QL NAA+NON-PROBE: SIGNIFICANT CHANGE UP
SARS-COV-2 RNA SPEC QL NAA+PROBE: SIGNIFICANT CHANGE UP
SARS-COV-2 RNA SPEC QL NAA+PROBE: SIGNIFICANT CHANGE UP
SCHISTOCYTES BLD QL AUTO: SLIGHT — SIGNIFICANT CHANGE UP
SODIUM SERPL-SCNC: 139 MMOL/L — SIGNIFICANT CHANGE UP (ref 135–145)
SP GR SPEC: 1.02 — SIGNIFICANT CHANGE UP (ref 1.01–1.02)
UROBILINOGEN FLD QL: ABNORMAL
WBC # BLD: 10.56 K/UL — HIGH (ref 3.8–10.5)
WBC # FLD AUTO: 10.56 K/UL — HIGH (ref 3.8–10.5)
WBC UR QL: 6 /HPF — HIGH (ref 0–5)

## 2023-08-11 PROCEDURE — 99222 1ST HOSP IP/OBS MODERATE 55: CPT

## 2023-08-11 PROCEDURE — 99222 1ST HOSP IP/OBS MODERATE 55: CPT | Mod: GC

## 2023-08-11 PROCEDURE — 71250 CT THORAX DX C-: CPT | Mod: 26,MA

## 2023-08-11 PROCEDURE — 71045 X-RAY EXAM CHEST 1 VIEW: CPT | Mod: 26

## 2023-08-11 PROCEDURE — 99285 EMERGENCY DEPT VISIT HI MDM: CPT

## 2023-08-11 RX ORDER — ACETAMINOPHEN 500 MG
650 TABLET ORAL EVERY 6 HOURS
Refills: 0 | Status: DISCONTINUED | OUTPATIENT
Start: 2023-08-11 | End: 2023-09-06

## 2023-08-11 RX ORDER — DIPHENHYDRAMINE HCL 50 MG
25 CAPSULE ORAL ONCE
Refills: 0 | Status: COMPLETED | OUTPATIENT
Start: 2023-08-11 | End: 2023-08-11

## 2023-08-11 RX ORDER — AZITHROMYCIN 500 MG/1
TABLET, FILM COATED ORAL
Refills: 0 | Status: DISCONTINUED | OUTPATIENT
Start: 2023-08-11 | End: 2023-08-11

## 2023-08-11 RX ORDER — INSULIN LISPRO 100/ML
7 VIAL (ML) SUBCUTANEOUS
Refills: 0 | Status: DISCONTINUED | OUTPATIENT
Start: 2023-08-11 | End: 2023-08-13

## 2023-08-11 RX ORDER — INSULIN GLARGINE 100 [IU]/ML
20 INJECTION, SOLUTION SUBCUTANEOUS AT BEDTIME
Refills: 0 | Status: DISCONTINUED | OUTPATIENT
Start: 2023-08-11 | End: 2023-08-13

## 2023-08-11 RX ORDER — INSULIN LISPRO 100/ML
VIAL (ML) SUBCUTANEOUS
Refills: 0 | Status: DISCONTINUED | OUTPATIENT
Start: 2023-08-11 | End: 2023-09-05

## 2023-08-11 RX ORDER — SODIUM CHLORIDE 9 MG/ML
4 INJECTION INTRAMUSCULAR; INTRAVENOUS; SUBCUTANEOUS EVERY 12 HOURS
Refills: 0 | Status: DISCONTINUED | OUTPATIENT
Start: 2023-08-11 | End: 2023-08-14

## 2023-08-11 RX ORDER — ONDANSETRON 8 MG/1
4 TABLET, FILM COATED ORAL EVERY 8 HOURS
Refills: 0 | Status: DISCONTINUED | OUTPATIENT
Start: 2023-08-11 | End: 2023-09-06

## 2023-08-11 RX ORDER — ERTAPENEM SODIUM 1 G/1
1000 INJECTION, POWDER, LYOPHILIZED, FOR SOLUTION INTRAMUSCULAR; INTRAVENOUS ONCE
Refills: 0 | Status: COMPLETED | OUTPATIENT
Start: 2023-08-11 | End: 2023-08-11

## 2023-08-11 RX ORDER — DEXTROSE 50 % IN WATER 50 %
25 SYRINGE (ML) INTRAVENOUS ONCE
Refills: 0 | Status: DISCONTINUED | OUTPATIENT
Start: 2023-08-11 | End: 2023-09-06

## 2023-08-11 RX ORDER — DEXTROSE 50 % IN WATER 50 %
15 SYRINGE (ML) INTRAVENOUS ONCE
Refills: 0 | Status: DISCONTINUED | OUTPATIENT
Start: 2023-08-11 | End: 2023-09-06

## 2023-08-11 RX ORDER — ALBUTEROL 90 UG/1
2.5 AEROSOL, METERED ORAL EVERY 6 HOURS
Refills: 0 | Status: DISCONTINUED | OUTPATIENT
Start: 2023-08-11 | End: 2023-08-12

## 2023-08-11 RX ORDER — APIXABAN 2.5 MG/1
5 TABLET, FILM COATED ORAL EVERY 12 HOURS
Refills: 0 | Status: DISCONTINUED | OUTPATIENT
Start: 2023-08-11 | End: 2023-09-04

## 2023-08-11 RX ORDER — GLUCAGON INJECTION, SOLUTION 0.5 MG/.1ML
1 INJECTION, SOLUTION SUBCUTANEOUS ONCE
Refills: 0 | Status: DISCONTINUED | OUTPATIENT
Start: 2023-08-11 | End: 2023-09-06

## 2023-08-11 RX ORDER — SODIUM CHLORIDE 9 MG/ML
1000 INJECTION, SOLUTION INTRAVENOUS
Refills: 0 | Status: DISCONTINUED | OUTPATIENT
Start: 2023-08-11 | End: 2023-09-06

## 2023-08-11 RX ORDER — DEXTROSE 50 % IN WATER 50 %
12.5 SYRINGE (ML) INTRAVENOUS ONCE
Refills: 0 | Status: DISCONTINUED | OUTPATIENT
Start: 2023-08-11 | End: 2023-09-06

## 2023-08-11 RX ORDER — ATORVASTATIN CALCIUM 80 MG/1
20 TABLET, FILM COATED ORAL AT BEDTIME
Refills: 0 | Status: DISCONTINUED | OUTPATIENT
Start: 2023-08-11 | End: 2023-09-06

## 2023-08-11 RX ORDER — SODIUM CHLORIDE 9 MG/ML
500 INJECTION, SOLUTION INTRAVENOUS ONCE
Refills: 0 | Status: COMPLETED | OUTPATIENT
Start: 2023-08-11 | End: 2023-08-11

## 2023-08-11 RX ORDER — MEXILETINE HYDROCHLORIDE 150 MG/1
200 CAPSULE ORAL EVERY 8 HOURS
Refills: 0 | Status: DISCONTINUED | OUTPATIENT
Start: 2023-08-11 | End: 2023-09-06

## 2023-08-11 RX ORDER — IPRATROPIUM/ALBUTEROL SULFATE 18-103MCG
3 AEROSOL WITH ADAPTER (GRAM) INHALATION ONCE
Refills: 0 | Status: DISCONTINUED | OUTPATIENT
Start: 2023-08-11 | End: 2023-08-11

## 2023-08-11 RX ORDER — IPRATROPIUM/ALBUTEROL SULFATE 18-103MCG
3 AEROSOL WITH ADAPTER (GRAM) INHALATION ONCE
Refills: 0 | Status: COMPLETED | OUTPATIENT
Start: 2023-08-11 | End: 2023-08-11

## 2023-08-11 RX ORDER — DIPHENHYDRAMINE HCL 50 MG
25 CAPSULE ORAL ONCE
Refills: 0 | Status: DISCONTINUED | OUTPATIENT
Start: 2023-08-11 | End: 2023-08-11

## 2023-08-11 RX ORDER — INSULIN LISPRO 100/ML
VIAL (ML) SUBCUTANEOUS AT BEDTIME
Refills: 0 | Status: DISCONTINUED | OUTPATIENT
Start: 2023-08-11 | End: 2023-08-26

## 2023-08-11 RX ORDER — BUDESONIDE, MICRONIZED 100 %
0.25 POWDER (GRAM) MISCELLANEOUS EVERY 12 HOURS
Refills: 0 | Status: DISCONTINUED | OUTPATIENT
Start: 2023-08-11 | End: 2023-08-15

## 2023-08-11 RX ORDER — TIOTROPIUM BROMIDE 18 UG/1
2 CAPSULE ORAL; RESPIRATORY (INHALATION) DAILY
Refills: 0 | Status: DISCONTINUED | OUTPATIENT
Start: 2023-08-11 | End: 2023-09-06

## 2023-08-11 RX ORDER — LANOLIN ALCOHOL/MO/W.PET/CERES
3 CREAM (GRAM) TOPICAL AT BEDTIME
Refills: 0 | Status: DISCONTINUED | OUTPATIENT
Start: 2023-08-11 | End: 2023-09-06

## 2023-08-11 RX ORDER — INSULIN GLARGINE 100 [IU]/ML
20 INJECTION, SOLUTION SUBCUTANEOUS ONCE
Refills: 0 | Status: COMPLETED | OUTPATIENT
Start: 2023-08-11 | End: 2023-08-11

## 2023-08-11 RX ADMIN — ERTAPENEM SODIUM 120 MILLIGRAM(S): 1 INJECTION, POWDER, LYOPHILIZED, FOR SOLUTION INTRAMUSCULAR; INTRAVENOUS at 22:09

## 2023-08-11 RX ADMIN — ALBUTEROL 2.5 MILLIGRAM(S): 90 AEROSOL, METERED ORAL at 21:00

## 2023-08-11 RX ADMIN — SODIUM CHLORIDE 500 MILLILITER(S): 9 INJECTION, SOLUTION INTRAVENOUS at 16:22

## 2023-08-11 RX ADMIN — Medication 25 MILLIGRAM(S): at 21:31

## 2023-08-11 RX ADMIN — Medication 40 MILLIGRAM(S): at 21:31

## 2023-08-11 RX ADMIN — Medication 3 MILLILITER(S): at 14:48

## 2023-08-11 RX ADMIN — Medication 0.25 MILLIGRAM(S): at 21:25

## 2023-08-11 NOTE — H&P ADULT - PROBLEM SELECTOR PLAN 1
- CT chest showed inceased bronchial secretions  - on RA saturating well   - Sputum cultures   - check full RVP  - hypertonic saline and albuterol neb q6h standing, followed by use of airway clerance device (patient brought her own from home)  - chest PT as tolerated   - budesoninde neb q12h standing   - increase methylprednisolone to 40 mg IV bid for now, with taper to 40 mg IV daily in 1-2 days   - pulm following - CT chest showed increased bronchial secretions, on RA saturating well, speaking in full sentences, unclear trigger, likely due to infection, or inflammation.     - Sputum cultures , urine legionella, strep   - check full RVP  - hypertonic saline and albuterol neb q6h standing, followed by use of airway clerance device (patient brought her own from home)  - chest PT as tolerated   - budesonide neb q12h standing   - Ertapenem for at least 7 days , consider double coverage and/or atypical   - increase methylprednisolone to 40 mg IV bid for now, with taper to 40 mg IV daily in 1-2 days   - pulm following - CT chest showed increased bronchial secretions, on RA saturating well, speaking in full sentences, unclear trigger, likely due to infection, or inflammation.     - Sputum cultures , urine legionella, strep   - RVP negative  - hypertonic saline and albuterol neb q6h standing, followed by use of airway clerance device (patient brought her own from home)  - chest PT as tolerated   - budesonide neb q12h standing   - Monitor off antibiotics  - increase methylprednisolone to 40 mg IV bid for now, with taper to 40 mg IV daily in 1-2 days   - pulm following

## 2023-08-11 NOTE — ED PROVIDER NOTE - NSICDXPASTSURGICALHX_GEN_ALL_CORE_FT
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    H/O pelvic surgery 5 years ago - s/p fracture    H/O total knee replacement, bilateral 5 years ago    History of partial hysterectomy 30 years ago - fibroids    History of sinus surgery multiple sinus surgeries    History of tracheomalacia 2015 - attempted tracheal stenting ([facility])- course complicated by obstruction, respiratory failure, multiple CPR attempts -  stent discontinued; 10/20/2016 Tracheobronchoplasty (Prolene Mesh) performed at [facility] by Dr Zapien    Rectal bleeding exam under anesthesia ([facility]) 2/2018    S/P bronchoscopy 6/5/2018 - [place] (Dr Zapien) no evidence of tracheobronchomalacia in trachea or bronchial tubes

## 2023-08-11 NOTE — H&P ADULT - TIME BILLING
- Ordering, reviewing, and interpreting labs, testing, and imaging.  - Independently obtaining a review of systems and performing a physical exam  - Reviewing consultant documentation/recommendations  - Counselling and educating patient regarding interpretation of aforementioned items and plan of care.  - Does not include time spent on resident education.

## 2023-08-11 NOTE — CONSULT NOTE ADULT - ASSESSMENT
71 yo PMHx  asthma on chronic steroids, bronchiectasis, allergic rhinitis,  recurrent PNA, PND, tracheomalacia s/p tracheoplasty, tracheobronchomalacia, ESBL proteus infections (sputum),  diabetes, paroxysmal A-fib on Eliquis, colorectal cancer many years ago status post colostomy presenting with acute on chronic shortness of breath in the setting of recent completion of abx and tapering of her steroids as an outpatient. She reports increased cough/sputum production, worsened dyspnea/wheezing.      - Pt so far afebrile, leukocytosis present, but not too far from baseline. Would consult ID given prior history of multidrug resistant organisms   - send sputum cultures   - pending CT chest   - check full RVP  - hypertonic saline and albuterol neb q6h standing, followed by use of airway clerance device (patient brought her own from home)  - chest PT as tolerated   - budesoninde neb q12h standing   - increase methylprednisolone to 40 mg IV bid for now, with taper to 40 mg IV daily in1-2 days   -  Thank you for this consult, will continue to follow with you

## 2023-08-11 NOTE — H&P ADULT - ATTENDING COMMENTS
72 F with hx of asthma on chronic steroids, DM, pAfib on Eliquis, colorectal ca yrs ago s/p colostomy, recent admission for parainfluenza PNA with ESBL proteus in sputum s/p ertapenem course. Pt p/w SOB, found to have bronchial secretions on CT, admitted for asthma exacerbation.    # Asthma exacerbation - May have been caused by steroid taper outpatient vs infection. However currently afebrile mild leukocytosis near baseline (on steroids). Pulmonology following, planning to increase methylprednisolone to 40mg IV BID for now with taper in upcoming days. Continue airway clearance with duoneb, hypertonic saline, and chest PT. RVP negative. Check sputum cultures.     # SIRS - no organ dysfunction. Not convinced there is active infection currently. However would do infectious workup. Blood cultures pending. Obtain sputum cultures, check urine legionella ag. Pt has hx of ESBL proteus in sputum and was treated with ertapenem previously. Consult ID in the AM for recommendations. Given no clear infectious source right now, will monitor off antibiotics. However low threshold to start empiric abx if fever or worsening leukocytosis.     # T2DM - Start Lantus 20u, admelog 7u, and insulin sliding scale. CC diet. Uptitrate as needed given pt on steroids and has hx of uncontrolled diabetes (A1C 9.1 in June 2023)    # Afib - Continue Eliquis and mexiletine. Check EKG.     # DVT ppx - on eliquis  # Abx ppx - on bactrim for ppx given chronic steroid use    Discussed plan with resident. 72 F with hx of asthma on chronic steroids, DM, pAfib on Eliquis, colorectal ca yrs ago s/p colostomy, recent admission for parainfluenza PNA with ESBL proteus in sputum s/p ertapenem course. Pt p/w SOB, found to have bronchial secretions on CT, admitted for asthma exacerbation.    # Asthma exacerbation - May have been caused by steroid taper outpatient vs infection. However currently afebrile mild leukocytosis near baseline (on steroids). Pulmonology following, consultation note reviewed; planning to increase methylprednisolone to 40mg IV BID for now with taper in upcoming days. CT Chest findings reviewed; new mucous secretions in bronchi. Continue airway clearance with duoneb, hypertonic saline, and chest PT. RVP negative. Check sputum cultures.     # SIRS - no organ dysfunction. Not convinced there is active infection currently. However would do infectious workup. Blood cultures pending. Obtain sputum cultures, check urine legionella ag. Pt has hx of ESBL proteus in sputum and was treated with ertapenem previously. Consult ID in the AM for recommendations. Given no clear infectious source right now, will monitor off antibiotics. However low threshold to start empiric abx if fever or worsening leukocytosis.     # T2DM - Start Lantus 20u, admelog 7u, and insulin sliding scale. CC diet. Uptitrate as needed given pt on steroids and has hx of uncontrolled diabetes (A1C 9.1 in June 2023)    # Afib - Continue Eliquis and mexiletine. Check EKG - ordered.     # DVT ppx - on eliquis  # Abx ppx - on bactrim for ppx given chronic steroid use    Discussed plan with resident.    60 minutes spent on total encounter. Time spent during the encounter on this date of service was due to:  - Ordering, reviewing, and interpreting labs, testing, and imaging.  - Independently obtaining a review of systems and performing a physical exam  - Reviewing consultant documentation/recommendations  - Counselling and educating patient regarding interpretation of aforementioned items and plan of care.  - Does not include time spent on resident education.

## 2023-08-11 NOTE — ED PROVIDER NOTE - ATTENDING CONTRIBUTION TO CARE
71 yo F w/ PMHx of multidrug-resistant respiratory infection s/p broad-spectrum antibiotics, COPD/asthma on chronic steroids, DM on insulin, A-fib on Eliquis, and history of colorectal cancer s/p colostomy presents for 3 days of worsening SOB and cough with greenish phlegm, most recently admitted for acute respiratory failure with hypoxia secondary to asthma/COPD exacerbation and bronchopneumonia (6/5/2023 - 6/16/2023).    ESBL/Proteus/yeast in sputum culture, pulm already aware to see patient. In the setting of recent pneumonia productive cough rhonchorous breath sounds dyspnea on exertion.  Bill appears well here with no respiratory distress conversational vital stable awaiting pulmonology input and work-up.

## 2023-08-11 NOTE — H&P ADULT - PROBLEM SELECTOR PLAN 5
PCP PPT: c/w bactrim   diet, dash consistent carb  Dvt: on eliquis  Dispo: pending clinical improvement

## 2023-08-11 NOTE — H&P ADULT - NSHPSOCIALHISTORY_GEN_ALL_CORE
no smoking, quit drinking, and lives with daughter after she moved from [facility]

## 2023-08-11 NOTE — H&P ADULT - NSICDXPASTSURGICALHX_GEN_ALL_CORE_FT
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    H/O pelvic surgery 5 years ago - s/p fracture    H/O total knee replacement, bilateral 5 years ago    History of partial hysterectomy 30 years ago - fibroids    History of sinus surgery multiple sinus surgeries    History of tracheomalacia 2015 - attempted tracheal stenting ([facility])- course complicated by obstruction, respiratory failure, multiple CPR attempts -  stent discontinued; 10/20/2016 Tracheobronchoplasty (Prolene Mesh) performed at [facility] by Dr Zapien    Rectal bleeding exam under anesthesia ([facility]) 2/2018    S/P bronchoscopy 6/5/2018 - [place] [facility] (Dr Zapien) no evidence of tracheobronchomalacia in trachea or bronchial tubes

## 2023-08-11 NOTE — ED PROCEDURE NOTE - PROCEDURE ADDITIONAL DETAILS
18G US IV proximal to L AC
Emergency Department Focused Ultrasound performed at patient's bedside for placement of ultrasound guided IV. Target vein compressed and visualized without pulsation. The study was confirmed with blood return and ease of flushing saline.    Upper extremity laterality: L forearm  IV Gauge: 20g

## 2023-08-11 NOTE — ED ADULT TRIAGE NOTE - NSWEIGHTCALCTOOLDRUG_GEN_A_CORE
States "I was spotting last night and then I had some pink-streak in my urine today". LMP 1/10/23. . Denies abdominal pain or cramping, vaginal bleeding, dysuria or n/v at this time. Denies PMH.  used

## 2023-08-11 NOTE — ED PROVIDER NOTE - PHYSICAL EXAMINATION
GENERAL: no acute distress, mesomorphic body habitus  HEENT: atraumatic, normocephalic, vision grossly intact, EOMI, no conjunctivitis or discharge, hearing grossly intact, no nasal discharge or epistaxis, clear pharynx  CV: regular rate, normal rhythm, normal S1/S2, no murmurs/rubs, no cyanosis  PULM: rales/rhonchi in diffuse lung fields, slightly increased work of breathing on RA  GI: soft/non-tender/nondistended abdomen, no guarding or rebound tenderness, no palpable masses  NEURO: A&Ox4, follows commands, normal speech, no focal motor or sensory deficits  MSK: no joint tenderness/swelling/erythema, ranging all extremities with no appreciable loss of ROM  EXT: no peripheral edema, no calf tenderness, no redness or swelling  SKIN: warm, dry, and intact, no rashes  PSYCH: appropriate mood and affect

## 2023-08-11 NOTE — CONSULT NOTE ADULT - SUBJECTIVE AND OBJECTIVE BOX
[facility] DIVISION OF PULMONARY, CRITICAL CARE AND SLEEP MEDICINE  PULMONARY CONSULTATION NOTE  OFFICE NUMBER: [phone]    NAME: RAYRAY BLOOM  MEDICAL RECORD NUMBER: [id number]    CHIEF COMPLAINT: Patient is a 72y old  Female who presents with a chief complaint of dyspnea    HISTORY OF PRESENT ILLNESS: 73 yo PMHx  asthma on chronic steroids, bronchiectasis, allergic rhinitis,  recurrent PNA, PND, tracheomalacia s/p tracheoplasty, tracheobronchomalacia, ESBL proteus infections (sputum),  diabetes, paroxysmal A-fib on Eliquis, colorectal cancer many years ago status post colostomy presenting with concern for shortness of breath.     Ms. Bloom states she has had chronic shortness of breath and cough for years. SHe had a recent hospitalization at an outside hospital and had just recently completed a course of antibiotics (last dose 1 week ago). She was on a steroid taper. Methylprednisolone dose was reduced from 32 to 28 mg po daily. She states since dose reduction she has had worsened dyspnea. On day of admission she could not walk due to severe shortness of breath. She also has a worsened cough - with greenish/yellowish sputum - increased from baseline. She endorses diaphoresis and nasal congestion.     He has had chronic dyspnea/severe asthma since childhood. She states she had a severe sinus infection requiring surgery at ?10 yo and since then has had difficulties with breathing. She has been on chronic steroids for over 20 years per report. She AT home she uses Breo, Spiriva, albuterol neb and a cough assist device.       ====================BACKGROUND INFORMATION============================    FAMILY HISTORY: FAMILY HISTORY:  Family history of asthma    Family history of breast cancer (Sibling)    Family history of diabetes mellitus type II        PAST MEDICAL AND SURGICAL HISTORY: PAST MEDICAL & SURGICAL HISTORY:  Atrial fibrillation  paroxysmal, on eliquis      Diabetes  Type 2      COPD (chronic obstructive pulmonary disease)      Adrenal insufficiency  Medrol daily for over 50 years      Aortic insufficiency  moderate AR on echo 5/3/2018      Pelvic fracture      Asthma      Tracheobronchomalacia  diagnosed 2015, s/p bronchial thermoplasty 2016 (Dr Zapien); recent bronchoscopy 6/5/2018 revealed no evidence of tracheobronchomalacia in trachea or bronchial tubes      Colorectal cancer  4/2018- last treatment , chemo and radiation      Rectal bleeding      Seizure  x 1 1/7/18      DVT (deep venous thrombosis)  15-20 years ago, took coumadin      TIA (transient ischemic attack)  multiple, last 5 years ago - presents as right-sided weakness      History of partial hysterectomy  30 years ago - fibroids      H/O total knee replacement, bilateral  5 years ago      History of sinus surgery  multiple sinus surgeries      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      H/O pelvic surgery  5 years ago - s/p fracture      History of tracheomalacia  2015 - attempted tracheal stenting ([facility])- course complicated by obstruction, respiratory failure, multiple CPR attempts -  stent discontinued; 10/20/2016 Tracheobronchoplasty (Prolene Mesh) performed at [facility] by Dr Zapien      S/P bronchoscopy  6/5/2018 - [place] (Dr Zapien) no evidence of tracheobronchomalacia in trachea or bronchial tubes      Rectal bleeding  exam under anesthesia ([facility]) 2/2018          ALLERGIES:Allergies    tetanus toxoid (Short breath)  cefepime (Anaphylaxis)  penicillin (Anaphylaxis)  Avelox (Short breath; Pruritus)  Dilaudid (Short breath)  codeine (Short breath)  aspirin (Short breath)  iodine (Short breath; Swelling)  Valium (Short breath)  shellfish (Anaphylaxis)    Intolerances        HOME MEDICATIONS:     OUTPATIENT PULMONARY DOCTOR:     SOCIAL HISTORY:  TOBACCO USE:  ALCOHOL:  DRUGS:  MARITAL STATUS:  EMPLOYMENT:  EXPOSURES:  RECENT TRAVELS:  PETS:  CODE STATUS:    ====================REVIEW OF SYSTEMS=====================================  CONSTITUTIONAL:  CARDIOVASCULAR:  PULMONARY:  GASTROINTESTINAL:  [] ALL OTHER REVIEW OF SYSTEMS ARE NEGATIVE   [] UNABLE TO OBTAIN REVIEW OF SYSTEMS DUE TO ______________      ====================PHYSICAL EXAM=========================================    VITALS: ICU Vital Signs Last 24 Hrs  T(C): 36.4 (11 Aug 2023 13:09), Max: 36.4 (11 Aug 2023 13:09)  T(F): 97.5 (11 Aug 2023 13:09), Max: 97.5 (11 Aug 2023 13:09)  HR: 90 (11 Aug 2023 13:09) (90 - 90)  BP: 121/65 (11 Aug 2023 13:09) (121/65 - 121/65)  BP(mean): --  ABP: --  ABP(mean): --  RR: 22 (11 Aug 2023 13:09) (22 - 22)  SpO2: 100% (11 Aug 2023 13:09) (100% - 100%)    O2 Parameters below as of 11 Aug 2023 13:09  Patient On (Oxygen Delivery Method): room air            INTAKE and OUTPUT: I&O's Summary      WEIGHT: Daily Height in cm: 170.18 (11 Aug 2023 13:09)    Daily     GLUCOSE: CAPILLARY BLOOD GLUCOSE       GENERAL: awake, alert, NAD   CARDIOVASCULAR:s1/s2, RRR  RESPIRATORY: coarse breath sounds bilaterally, occasional expiratory wheeze, + cough, saturating 99% on ra  EXTREMITIES no significant edema  NEUROLOGIC:awake, alert conversing normally   PSYCHIATRIC appropriate mood and affect         ====================MEDICATIONS===========================================  MEDICATIONS  (STANDING):      MEDICATIONS  (PRN):      ====================LABORATORY RESULTS====================================  CBC Full  -  ( 11 Aug 2023 16:05 )  WBC Count : 10.56 K/uL  RBC Count : 4.44 M/uL  Hemoglobin : 10.0 g/dL  Hematocrit : 35.8 %  Platelet Count - Automated : 227 K/uL  Mean Cell Volume : 80.6 fl  Mean Cell Hemoglobin : 22.5 pg  Mean Cell Hemoglobin Concentration : 27.9 gm/dL  Auto Neutrophil # : 7.75 K/uL  Auto Lymphocyte # : 1.31 K/uL  Auto Monocyte # : 1.21 K/uL  Auto Eosinophil # : 0.10 K/uL  Auto Basophil # : 0.19 K/uL  Auto Neutrophil % : 73.4 %  Auto Lymphocyte % : 12.4 %  Auto Monocyte % : 11.5 %  Auto Eosinophil % : 0.9 %  Auto Basophil % : 1.8 %                                    08-11    139  |  101  |  14  ----------------------------<  272<H>  4.4   |  22  |  0.80    Ca    8.6      11 Aug 2023 16:05    TPro  6.1  /  Alb  4.3  /  TBili  0.6  /  DBili  x   /  AST  36  /  ALT  18  /  AlkPhos  86  08-11        PT/INR - ( 11 Aug 2023 16:06 )   PT: 12.0 sec;   INR: 1.09 ratio         PTT - ( 11 Aug 2023 16:06 )  PTT:28.0 sec    Creatinine Trend: 0.80<--      ====================RADIOLOGY and ECHOCARDIOGRAPHY=======================    CXR:    CT CHEST:    ECHOCARDIOGRAPHY:    POINT OF CARE ULTRASOUND:

## 2023-08-11 NOTE — H&P ADULT - ASSESSMENT
72F hx asthma on chronic steroids, DM, pAfib on Eliquis, colorectal ca yrs ago s/p colostomy, recent admission for parainfluenza PNA on various antibiotics see HPI . P/w SOB, found to have bronchial secretions on CT,admit to medicine for exacerbation of asthma.

## 2023-08-11 NOTE — ED ADULT TRIAGE NOTE - ACCOMPANIED BY
DATE  19    REASON FOR CONSULTATION  Low back pain    REQUESTING PROVIDER  Sesar Hinojosa MD    HISTORY OF PRESENT CORBY Prince returns to discuss her progress. In review, she is a pleasant 57 year old female who is status post L5-S1 anterior fusion performed by Dr. Joe You in . She did well following surgery until several months ago when she developed mechanical low back pain. As before, the pain is described as an aching sensation, localized to the lumbosacral region, aggravated by twisting and bending movements. Pain improves significantly when at rest.    Since her previous visit, she has undergone a course of physical therapy without significant improvement. Today, she also discussed her chronic neck pain. This pain is moderate to severe, aggravated by neck activities. She does not report any upper extremity neurological symptoms.    PAST MEDICAL HISTORY/PAST SURGICAL HISTORY/FAMILY HISTORY/REVIEW OF SYSTEMS  I have reviewed her previous medical history, surgical history, family history, social history, current medications, and review of systems as outlined in the medical records and have no revision to this.    Past Medical History:   Diagnosis Date   • Allergic rhinitis    • Anxiety    • Asthma    • Bronchitis    • Depression    • Endometriosis of cervix    • Fibromyalgia    • Fracture    • Gastroesophageal reflux disease    • HTN (hypertension)    • Hypothyroidism    • MVP (mitral valve prolapse)    • PONV (postoperative nausea and vomiting)    • Sinusitis, chronic      Past Surgical History:   Procedure Laterality Date   • Back surgery     •  delivery only     •  section, classic      x3   • Colonoscopy  2016   • Colonoscopy w/ polypectomy  2012   • Dexa bone density axial skeleton  2009   • Eye surgery     • Hysterectomy     • Lumbar fusion  2009   • Myringotomy w/ tubes Left    • Salpingoophorectomy      bilateral,  2006     Social  History     Socioeconomic History   • Marital status:      Spouse name: Not on file   • Number of children: Not on file   • Years of education: Not on file   • Highest education level: Not on file   Occupational History   • Not on file   Social Needs   • Financial resource strain: Not on file   • Food insecurity:     Worry: Not on file     Inability: Not on file   • Transportation needs:     Medical: Not on file     Non-medical: Not on file   Tobacco Use   • Smoking status: Never Smoker   • Smokeless tobacco: Never Used   Substance and Sexual Activity   • Alcohol use: No   • Drug use: No   • Sexual activity: Never     Birth control/protection: Surgical   Lifestyle   • Physical activity:     Days per week: Not on file     Minutes per session: Not on file   • Stress: Not on file   Relationships   • Social connections:     Talks on phone: Not on file     Gets together: Not on file     Attends Yazdanism service: Not on file     Active member of club or organization: Not on file     Attends meetings of clubs or organizations: Not on file     Relationship status: Not on file   • Intimate partner violence:     Fear of current or ex partner: Not on file     Emotionally abused: Not on file     Physically abused: Not on file     Forced sexual activity: Not on file   Other Topics Concern   • Not on file   Social History Narrative   • Not on file     Family History   Problem Relation Age of Onset   • Cancer Father         lung   • Hypertension Father    • Dermatitis Father    • Cancer Mother 39        endometrial   • Hypertension Mother    • Dermatitis Mother    • Hypertension Sister    • Dermatitis Sister    • Thyroid Sister    • Hypertension Brother    • Dermatitis Brother    • Hypertension Son    • Dermatitis Maternal Grandmother    • Dermatitis Paternal Grandmother    • Dermatitis Maternal Grandfather    • Dermatitis Paternal Grandfather    • Hypertension Brother    • Dermatitis Brother      Patient Active Problem  List   Diagnosis   • HTN (hypertension)   • Anxiety   • Hypothyroidism   • Asthma   • Allergic rhinitis   • MVP (mitral valve prolapse)   • Fibromyalgia   • Skin infection   • Actinic keratosis   • Benign neoplasm of skin, site unspecified   • Other seborrheic keratosis   • Temporomandibular joint disorders, unspecified   • Cervicalgia   • Unspecified sinusitis (chronic)   • Lipoma of other skin and subcutaneous tissue   • Simple chronic serous otitis media       REVIEW OF SYSTEMS  All other systems are reviewed and are negative except as documented in the HPI (history of present illness).    PHYSICAL EXAMINATION  Visit Vitals  /70   Pulse 58   Resp 16   Wt 61.2 kg   SpO2 99%   BMI 24.69 kg/m²     She is sitting and standing comfortably with normal hygiene and apparel.  Peripheral vascular examination shows normal skin color and turgor.  Gait and station are normal.  Motor examination shows intact strength in hip flexors, quadriceps, hamstrings, anterior tibialis, and gastrocsoleus muscles bilaterally.  Sensory testing is intact in all lower extremity dermatomes and grossly in both upper extremities.  Deep tendon reflexes are symmetric in biceps, triceps, quadriceps, and ankle jerks.  No pathological reflexes are seen.  Mental status examination shows she is alert and oriented with intact language and memory functions.  Cranial nerves are grossly intact.        DIAGNOSTICS  MRI lumbar spine from December 2018 shows previous L5-S1 fusion without complication. There is an incidental bone island at T11, and multilevel spondylosis without significant neural compression.    ASSESSMENT AND PLAN  Lumbar spondylosis and mechanical low back pain.    We again reviewed her diagnosis and treatment options. For now, she will follow-up with pain management to discuss percutaneous options. She will also add neck treatment of her current physical therapy. She knows to contact me any time with new symptoms, otherwise will  follow-up after her pain management procedures.    Total time spent 25 minutes, with greater than 50% of the total time spent in education, counseling, coordination of care, etc.     daughter/Immediate family member

## 2023-08-11 NOTE — ED PROVIDER NOTE - OBJECTIVE STATEMENT
73 yo F w/ PMHx of multidrug-resistant respiratory infection s/p broad-spectrum antibiotics, COPD/asthma on chronic steroids, DM on insulin, A-fib on Eliquis, and history of colorectal cancer s/p colostomy presents for 3 days of worsening SOB, diaphragmatic pressure, and CP.  Patient has had several hospitalizations since 3/2022 for multiple reasons including sepsis secondary to pneumonia, aortic dissection, valve repair, COVID-pneumonia, Proteus/MRSA/pseudomonal respiratory infections.  Per chart review, patient was most recently admitted for acute respiratory failure with hypoxia secondary to asthma/COPD exacerbation and bronchopneumonia (6/5/2023 - 6/16/2023).  She was found to have ESBL/Proteus/yeast in sputum culture and was treated with ertapenem/Benadryl/vancomycin/aztreonam and methylprednisolone.  She was discharged home with a midline and was prescribed tobramycin, Medrol 28 mg, and unspecified inhalers.  Since discharge, patient has been developing worsening shortness of breath (with minimal exertion), CP, diaphragmatic pain with no coughs, and occasional diaphoresis.  She spoke with daughter pulmonologist Dr. Eulalio Allison who asked her to present to the ED and spoke with Dr. Neida Higgins.  Today, she took Zofran, meclizine, and 2 unspecified inhalers.  ROS negative for falls, syncope, and /GI symptoms.

## 2023-08-11 NOTE — ED ADULT NURSE NOTE - NSICDXPASTSURGICALHX_GEN_ALL_CORE_FT
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    H/O pelvic surgery 5 years ago - s/p fracture    H/O total knee replacement, bilateral 5 years ago    History of partial hysterectomy 30 years ago - fibroids    History of sinus surgery multiple sinus surgeries    History of tracheomalacia 2015 - attempted tracheal stenting ([facility])- course complicated by obstruction, respiratory failure, multiple CPR attempts -  stent discontinued; 10/20/2016 Tracheobronchoplasty (Prolene Mesh) performed at [facility] by Dr Zapien    Rectal bleeding exam under anesthesia ([facility]) 2/2018    S/P bronchoscopy 6/5/2018 - [facility] (Dr Zapien) no evidence of tracheobronchomalacia in trachea or bronchial tubes

## 2023-08-11 NOTE — H&P ADULT - PROBLEM SELECTOR PLAN 3
-on 25-30 lantus at home, and 7-20 mealtime as well  -ISS moderate risk   -change to 20 lantus, and 7 meal time for now. -c/w Eliquis, Mexiletine -c/w Eliquis, Mexiletine  - EKG ordered

## 2023-08-11 NOTE — ED PROVIDER NOTE - CLINICAL SUMMARY MEDICAL DECISION MAKING FREE TEXT BOX
73 yo F w/ PMHx of multidrug-resistant respiratory infection s/p broad-spectrum antibiotics, COPD/asthma on chronic steroids, DM on insulin, A-fib on Eliquis, and history of colorectal cancer s/p colostomy presents for 3 days of worsening SOB, diaphragmatic pressure, and CP.  Vital signs unremarkable.  Physical exam remarkable for rales/rhonchi in diffuse lung fields with slightly increased work of breathing on room air.  Concern for COPD/asthma exacerbation vs recurrence of multidrug-resistant pneumonia with concern for sepsis.  Also concern for ACS.  Low threshold for broad-spectrum empiric antibiotics and additional fluids.  Plan for sepsis work-up, ACS work-up, DuoNebs x3, and pulmonology consult.

## 2023-08-11 NOTE — H&P ADULT - NSHPLABSRESULTS_GEN_ALL_CORE
.  LABS:                         10.0   10.56 )-----------( 227      ( 11 Aug 2023 16:05 )             35.8     08-11    139  |  101  |  14  ----------------------------<  272<H>  4.4   |  22  |  0.80    Ca    8.6      11 Aug 2023 16:05    TPro  6.1  /  Alb  4.3  /  TBili  0.6  /  DBili  x   /  AST  36  /  ALT  18  /  AlkPhos  86  08-11    PT/INR - ( 11 Aug 2023 16:06 )   PT: 12.0 sec;   INR: 1.09 ratio         PTT - ( 11 Aug 2023 16:06 )  PTT:28.0 sec  Urinalysis Basic - ( 11 Aug 2023 19:17 )    Color: Yellow / Appearance: Clear / S.020 / pH: x  Gluc: x / Ketone: Small  / Bili: Negative / Urobili: 2 mg/dL   Blood: x / Protein: 30 mg/dL / Nitrite: Negative   Leuk Esterase: Small / RBC: 4 /hpf / WBC 6 /HPF   Sq Epi: x / Non Sq Epi: x / Bacteria: Negative    < from: CT Chest No Cont (23 @ 17:50) >    IMPRESSION:  Compared to 2023:    New mucous secretions in the bronchus intermedius.    Unchanged infectious/inflammatory small airways disease in the right   middle/lower lobes with multifocal small tree-in-bud nodules.    Status post ascending aortic and aortic valve replacement, with residual   dissection flap better seen on the prior study.    Stable indeterminate small left renal nodule and numerous pancreatic   cysts.    < end of copied text >

## 2023-08-11 NOTE — H&P ADULT - PROBLEM SELECTOR PLAN 2
-c/w Eliquis, Mexilitine -c/w Eliquis, Mexiletine -meet SIRS  -f/u blood culture, urine culture  -IVF   -monitor off abx for now

## 2023-08-11 NOTE — H&P ADULT - NSHPREVIEWOFSYSTEMS_GEN_ALL_CORE
Review of Systems:  Constitutional: No fever, No weight loss, good appetite/po intake  Head: No headache   Eyes: No blurry vision, No diplopia  Neuro: No tremors, No muscle weakness   Cardiovascular: + chest pain, No palpitations  Respiratory: + SOB, + cough  GI: No nausea, No vomiting, No diarrhea  : No dysuria, No hematuria  Skin: No rash  MSK: No joint pain   Psych: No depression  Heme: No abnormal bruising, no abnormal bleeding

## 2023-08-11 NOTE — ED ADULT NURSE NOTE - OBJECTIVE STATEMENT
Pt is a 71 y/o femae w/ PMHx of multidrug-resistant respiratory infection, AAA repair, COPD, asthma on chronic steroids, T2DM, Afib (Eliquis), colorectal cancer presents to the ED c/o worsening SOB.  Per chart review pt was found to have ESBL/Proteus/yeast in sputum culture and was treated with ertapenem/Benadryl/vancomycin/aztreonam and methylprednisolone.  Since discharge, patient has been developing worsening shortness of breath with chest pain and occasional diaphoresis.  Pt presents alert & oriented x 4, calm, able to follow commands, speech clear. Breathing spontaneous & nonlabored. On cardiac monitor, NSR. Abdomen soft & nondistended. Healing ulcer to L foot heel. Skin warm, clean, dry & intact. Denies  abdominal pain, back pain, n/v/d, fever, changes in vision. Call bell within reach and pt instructed on how to use, bed in lowest position, side rails up, wheels locked. Pt is a 73 y/o femae w/ PMHx of multidrug-resistant respiratory infection, AAA repair, COPD, asthma on chronic steroids, T2DM, Afib (Eliquis), colorectal cancer presents to the ED c/o worsening SOB.  Per chart review pt was found to have ESBL/Proteus/yeast in sputum culture and was treated with ertapenem/Benadryl/vancomycin/aztreonam and methylprednisolone.  Since discharge, patient has been developing worsening shortness of breath with chest pain and occasional diaphoresis.  Pt presents alert & oriented x 4, calm, able to follow commands, speech clear. Breathing spontaneous & nonlabored. On cardiac monitor, NSR. Ostomy bag noted to  reginadmen. Healing ulcer to L foot heel. Skin warm, clean, dry & intact. Denies  abdominal pain, back pain, n/v/d, fever, changes in vision. Call bell within reach and pt instructed on how to use, bed in lowest position, side rails up, wheels locked.

## 2023-08-11 NOTE — H&P ADULT - NSHPPHYSICALEXAM_GEN_ALL_CORE
PHYSICAL EXAM    Vital Signs Last 24 Hrs  T(C): 37.2 (11 Aug 2023 22:00), Max: 37.2 (11 Aug 2023 22:00)  T(F): 98.9 (11 Aug 2023 22:00), Max: 98.9 (11 Aug 2023 22:00)  HR: 90 (11 Aug 2023 22:00) (78 - 91)  BP: 119/72 (11 Aug 2023 22:00) (119/72 - 139/78)  BP(mean): --  RR: 18 (11 Aug 2023 22:00) (18 - 22)  SpO2: 98% (11 Aug 2023 22:00) (98% - 100%)    Parameters below as of 11 Aug 2023 22:00  Patient On (Oxygen Delivery Method): room air      GENERAL: NAD, lying comfortably in bed   HEAD:  Atraumatic, Normocephalic  EYES: EOMI b/l, PERRLA b/l, conjunctiva and sclera clear  NECK: Supple, No JVD, No LAD   CHEST/LUNG: ronchi throughout the lung field bilaterally  HEART: Regular rate and rhythm; S1 and S2 present, No murmurs, rubs, or gallops  ABDOMEN: Soft, Nontender, Nondistended; Bowel sounds present  EXTREMITIES:  2+ Peripheral Pulses, No clubbing, cyanosis, or edema  NEURO: AAOx3, non-focal   SKIN: No rashes or lesions PHYSICAL EXAM    Vital Signs Last 24 Hrs  T(C): 37.2 (11 Aug 2023 22:00), Max: 37.2 (11 Aug 2023 22:00)  T(F): 98.9 (11 Aug 2023 22:00), Max: 98.9 (11 Aug 2023 22:00)  HR: 90 (11 Aug 2023 22:00) (78 - 91)  BP: 119/72 (11 Aug 2023 22:00) (119/72 - 139/78)  BP(mean): --  RR: 18 (11 Aug 2023 22:00) (18 - 22)  SpO2: 98% (11 Aug 2023 22:00) (98% - 100%)    Parameters below as of 11 Aug 2023 22:00  Patient On (Oxygen Delivery Method): room air      GENERAL: NAD, lying comfortably in bed   HEAD:  Atraumatic, Normocephalic  EYES: EOMI b/l, PERRLA b/l, conjunctiva and sclera clear  NECK: Supple, No JVD, No LAD   CHEST/LUNG: rhonchi throughout the lung field bilaterally  HEART: Regular rate and rhythm; S1 and S2 present, + Systolic ejection murmur  ABDOMEN: Soft, Nontender, Nondistended; Bowel sounds present  EXTREMITIES:  2+ Peripheral Pulses, No clubbing, cyanosis, or edema  NEURO: AAOx3, non-focal   SKIN: No rashes or lesions

## 2023-08-11 NOTE — ED ADULT NURSE REASSESSMENT NOTE - NS ED NURSE REASSESS COMMENT FT1
Pt endorsing pain around IV site. IV site swollen and no longer patent. IV removed. MD Mera aware pt needs new USIV placement Pt endorsing pain around IV site. IV site swollen and no longer patent. IV removed. MD Mera aware pt needs new USIV placement for IV ABX

## 2023-08-11 NOTE — H&P ADULT - HISTORY OF PRESENT ILLNESS
71 yo PMHx  asthma on chronic steroids, bronchiectasis s/p surgery, allergic rhinitis,  recurrent PNA, PND, tracheomalacia s/p tracheoplasty, ESBL proteus infections (sputum),  diabetes, paroxysmal A-fib on Eliquis, colorectal cancer many years ago status post colostomy presenting with concern for shortness of breath. It started 2 and half weeks ago with dysnea on exertion, increased sputum production and running nose. She also endorsed sweatiness, chill, abd pain, denied fever, n/v/c/d, sick contact. she is upto date with vaccines.     Of note, she has had chronic shortness of breath and cough for years. SHe had a recent hospitalization at an outside hospital and had just recently completed a course of antibiotics (last dose 1 week ago). She was on a steroid taper. Methylprednisolone dose was reduced from 32 to 28 mg po daily from last monday to this monday. She states since dose reduction she has had worsened dyspnea. when she called Dr. Allison's office, he urged the patient to come to the ED for further management. Pulm is following and     She has had chronic dyspnea/severe asthma since childhood. She states she had a severe sinus infection requiring surgery at ?10 yo and since then has had difficulties with breathing. She has been on chronic steroids for over 20 years per report.. Patient was most recently admitted for acute respiratory failure with hypoxia secondary to asthma/COPD exacerbation and bronchopneumonia (6/5/2023 - 6/16/2023).  She was found to have ESBL/Proteus/yeast in sputum culture and was treated with ertapenem/Benadryl/vancomycin/aztreonam and methylprednisolone.  She was discharged home with a midline and was prescribed tobramycinShe uses Breo, Spiriva, albuterol neb and a cough assist device.     In the ED, BP stable 121/65, RR 22, saturating at RA at 100%, 97.5 temp. CBC has 10.56 HGB 10, platelet 227. chemistry normal except glucose 272. procal 0.13, trop 28. Last A1C 9.1 VBG 7.34, pCO2 is 52. CT showed new mucous secretions in the bronchus intermedius.Unchanged infectious/inflammatory small airways disease in the right middle/lower lobes with multifocal small tree-in-bud nodules.Status post ascending aortic and aortic valve replacement, with residual dissection flap better seen on the prior study.Stable indeterminate small left renal nodule and numerous pancreatic cysts. In the ED, she was on Duonebs, LR 500cc, ertapenem adn benadryl was given as well.  71 yo PMHx  asthma on chronic steroids, bronchiectasis s/p surgery, allergic rhinitis,  recurrent PNA, PND, tracheomalacia s/p tracheoplasty, ESBL proteus infections (sputum),  diabetes, paroxysmal A-fib on Eliquis, colorectal cancer many years ago status post colostomy presenting with concern for shortness of breath. It started 2 and half weeks ago with dysnea on exertion, increased sputum production and running nose. She also endorsed sweatiness, chill, abd pain, denied fever, n/v/c/d, sick contact. she is up to date with vaccines. she has had chronic shortness of breath and cough for years. SHe had a recent hospitalization at an outside hospital  for acute respiratory failure with hypoxia secondary to asthma/COPD exacerbation and bronchopneumonia 6/5/2023 - 6/16/2023), She was found to have ESBL/Proteus/yeast in sputum culture and was treated with ertapenem/Benadryl/vancomycin/aztreonam and methylprednisolone.   She was discharged home with a midline and completed a course of antibiotics ertapenem (last dose 1 week ago).Dr. Allison also prescribed tobramycin after discharge,  and her metylprednisolone dose was reduced from 32 to 28 mg po daily from last monday to this monday. She states since dose reduction she has had worsened dyspnea. when she called Dr. Allison's office, he urged the patient to come to the ED for further management.     Of note had chronic dyspnea/severe asthma since childhood. She states she had a severe sinus infection requiring surgery at ?12 yo and since then has had difficulties with breathing. She has been on chronic steroids for over 20 years per report.. She uses Breo, Spiriva, albuterol neb and a cough assist device.     In the ED, BP stable 121/65, RR 22, saturating at RA at 100%, 97.5 temp. CBC has 10.56 HGB 10, platelet 227. chemistry normal except glucose 272. procal 0.13, trop 28. Last A1C 9.1 VBG 7.34, pCO2 is 52. CT showed new mucous secretions in the bronchus intermedius.Unchanged infectious/inflammatory small airways disease in the right middle/lower lobes with multifocal small tree-in-bud nodules.Status post ascending aortic and aortic valve replacement, with residual dissection flap better seen on the prior study.Stable indeterminate small left renal nodule and numerous pancreatic cysts. In the ED, she was on Duonebs, LR 500cc, ertapenem adn benadryl was given as well.  71 yo PMHx  asthma on chronic steroids, bronchiectasis s/p surgery, allergic rhinitis,  recurrent PNA, PND, tracheomalacia s/p tracheoplasty, ESBL proteus infections (sputum),  diabetes, paroxysmal A-fib on Eliquis, colorectal cancer many years ago status post colostomy presenting with concern for shortness of breath. It started 2 and half weeks ago with dysnea on exertion, increased sputum production and running nose. She also endorsed sweatiness, chill, abd pain, denied fever, n/v/c/d, sick contact. she is up to date with vaccines. she has had chronic shortness of breath and cough for years. SHe had a recent hospitalization at an outside hospital  for acute respiratory failure with hypoxia secondary to asthma/COPD exacerbation and bronchopneumonia 6/5/2023 - 6/16/2023), She was found to have ESBL/Proteus/yeast in sputum culture and was treated with ertapenem/Benadryl/vancomycin/aztreonam and methylprednisolone.   She was discharged home with a midline and completed a course of antibiotics ertapenem (last dose 06/25).Dr. Allison also prescribed tobramycin after discharge,  and her metylprednisolone dose was reduced from 32 to 28 mg po daily from last monday to this monday. She states since dose reduction she has had worsened dyspnea. when she called Dr. Allison's office, he urged the patient to come to the ED for further management.     Of note had chronic dyspnea/severe asthma since childhood. She states she had a severe sinus infection requiring surgery at ?10 yo and since then has had difficulties with breathing. She has been on chronic steroids for over 20 years per report.. She uses Breo, Spiriva, albuterol neb and a cough assist device.     In the ED, BP stable 121/65, RR 22, saturating at RA at 100%, 97.5 temp. CBC has 10.56 HGB 10, platelet 227. chemistry normal except glucose 272. procal 0.13, trop 28. Last A1C 9.1 VBG 7.34, pCO2 is 52. CT showed new mucous secretions in the bronchus intermedius.Unchanged infectious/inflammatory small airways disease in the right middle/lower lobes with multifocal small tree-in-bud nodules.Status post ascending aortic and aortic valve replacement, with residual dissection flap better seen on the prior study.Stable indeterminate small left renal nodule and numerous pancreatic cysts. In the ED, she was on Duonebs, LR 500cc, ertapenem adn benadryl was given as well.

## 2023-08-11 NOTE — H&P ADULT - PROBLEM SELECTOR PLAN 4
PCP PPT: c/w bactrim   diet, dash consistent carb  Dvt: on eliquis  Dispo: pending clincal improvement PCP PPT: c/w bactrim   diet, dash consistent carb  Dvt: on eliquis  Dispo: pending clinical improvement -on 25-30 lantus at home, and 7-20 mealtime as well  -ISS moderate risk   -change to 20 lantus, and 7 meal time for now. -on 25-30 lantus at home, and 7-20 mealtime as well  -ISS moderate risk   -change to 20 lantus, and 7 meal time for now.  - Will likely need to uptitrate given increase in steroids and hx of uncontrolled diabetes.

## 2023-08-11 NOTE — PATIENT PROFILE ADULT - FALL HARM RISK - HARM RISK INTERVENTIONS
Assistance with ambulation/Assistance OOB with selected safe patient handling equipment/Communicate Risk of Fall with Harm to all staff/Reinforce activity limits and safety measures with patient and family/Tailored Fall Risk Interventions/Visual Cue: Yellow wristband and red socks/Bed in lowest position, wheels locked, appropriate side rails in place/Call bell, personal items and telephone in reach/Instruct patient to call for assistance before getting out of bed or chair/Non-slip footwear when patient is out of bed/[place] to call system/Physically safe environment - no spills, clutter or unnecessary equipment/Purposeful Proactive Rounding/Room/bathroom lighting operational, light cord in reach

## 2023-08-12 LAB
ALBUMIN SERPL ELPH-MCNC: 3.5 G/DL — SIGNIFICANT CHANGE UP (ref 3.3–5)
ALP SERPL-CCNC: 82 U/L — SIGNIFICANT CHANGE UP (ref 40–120)
ALT FLD-CCNC: 16 U/L — SIGNIFICANT CHANGE UP (ref 10–45)
ANION GAP SERPL CALC-SCNC: 15 MMOL/L — SIGNIFICANT CHANGE UP (ref 5–17)
AST SERPL-CCNC: 33 U/L — SIGNIFICANT CHANGE UP (ref 10–40)
BASOPHILS # BLD AUTO: 0.01 K/UL — SIGNIFICANT CHANGE UP (ref 0–0.2)
BASOPHILS NFR BLD AUTO: 0.1 % — SIGNIFICANT CHANGE UP (ref 0–2)
BILIRUB SERPL-MCNC: 0.5 MG/DL — SIGNIFICANT CHANGE UP (ref 0.2–1.2)
BUN SERPL-MCNC: 16 MG/DL — SIGNIFICANT CHANGE UP (ref 7–23)
CALCIUM SERPL-MCNC: 8.8 MG/DL — SIGNIFICANT CHANGE UP (ref 8.4–10.5)
CHLORIDE SERPL-SCNC: 101 MMOL/L — SIGNIFICANT CHANGE UP (ref 96–108)
CO2 SERPL-SCNC: 21 MMOL/L — LOW (ref 22–31)
CREAT SERPL-MCNC: 0.67 MG/DL — SIGNIFICANT CHANGE UP (ref 0.5–1.3)
EGFR: 92 ML/MIN/1.73M2 — SIGNIFICANT CHANGE UP
EOSINOPHIL # BLD AUTO: 0 K/UL — SIGNIFICANT CHANGE UP (ref 0–0.5)
EOSINOPHIL NFR BLD AUTO: 0 % — SIGNIFICANT CHANGE UP (ref 0–6)
GLUCOSE SERPL-MCNC: 353 MG/DL — HIGH (ref 70–99)
HCT VFR BLD CALC: 34.3 % — LOW (ref 34.5–45)
HGB BLD-MCNC: 9.7 G/DL — LOW (ref 11.5–15.5)
IMM GRANULOCYTES NFR BLD AUTO: 1.2 % — HIGH (ref 0–0.9)
LYMPHOCYTES # BLD AUTO: 0.36 K/UL — LOW (ref 1–3.3)
LYMPHOCYTES # BLD AUTO: 4 % — LOW (ref 13–44)
MCHC RBC-ENTMCNC: 22.6 PG — LOW (ref 27–34)
MCHC RBC-ENTMCNC: 28.3 GM/DL — LOW (ref 32–36)
MCV RBC AUTO: 79.8 FL — LOW (ref 80–100)
MONOCYTES # BLD AUTO: 0.14 K/UL — SIGNIFICANT CHANGE UP (ref 0–0.9)
MONOCYTES NFR BLD AUTO: 1.5 % — LOW (ref 2–14)
NEUTROPHILS # BLD AUTO: 8.43 K/UL — HIGH (ref 1.8–7.4)
NEUTROPHILS NFR BLD AUTO: 93.2 % — HIGH (ref 43–77)
NRBC # BLD: 0 /100 WBCS — SIGNIFICANT CHANGE UP (ref 0–0)
PLATELET # BLD AUTO: 179 K/UL — SIGNIFICANT CHANGE UP (ref 150–400)
POTASSIUM SERPL-MCNC: 4.9 MMOL/L — SIGNIFICANT CHANGE UP (ref 3.5–5.3)
POTASSIUM SERPL-SCNC: 4.9 MMOL/L — SIGNIFICANT CHANGE UP (ref 3.5–5.3)
PROT SERPL-MCNC: 5.9 G/DL — LOW (ref 6–8.3)
RBC # BLD: 4.3 M/UL — SIGNIFICANT CHANGE UP (ref 3.8–5.2)
RBC # FLD: 26.2 % — HIGH (ref 10.3–14.5)
SODIUM SERPL-SCNC: 137 MMOL/L — SIGNIFICANT CHANGE UP (ref 135–145)
WBC # BLD: 9.05 K/UL — SIGNIFICANT CHANGE UP (ref 3.8–10.5)
WBC # FLD AUTO: 9.05 K/UL — SIGNIFICANT CHANGE UP (ref 3.8–10.5)

## 2023-08-12 PROCEDURE — 99233 SBSQ HOSP IP/OBS HIGH 50: CPT | Mod: GC

## 2023-08-12 PROCEDURE — 93010 ELECTROCARDIOGRAM REPORT: CPT

## 2023-08-12 RX ORDER — CHLORHEXIDINE GLUCONATE 213 G/1000ML
1 SOLUTION TOPICAL DAILY
Refills: 0 | Status: DISCONTINUED | OUTPATIENT
Start: 2023-08-12 | End: 2023-09-06

## 2023-08-12 RX ORDER — DIPHENHYDRAMINE HCL 50 MG
50 CAPSULE ORAL ONCE
Refills: 0 | Status: COMPLETED | OUTPATIENT
Start: 2023-08-12 | End: 2023-08-12

## 2023-08-12 RX ORDER — DIPHENHYDRAMINE HCL 50 MG
25 CAPSULE ORAL ONCE
Refills: 0 | Status: DISCONTINUED | OUTPATIENT
Start: 2023-08-12 | End: 2023-08-12

## 2023-08-12 RX ORDER — IPRATROPIUM/ALBUTEROL SULFATE 18-103MCG
3 AEROSOL WITH ADAPTER (GRAM) INHALATION EVERY 6 HOURS
Refills: 0 | Status: DISCONTINUED | OUTPATIENT
Start: 2023-08-12 | End: 2023-09-06

## 2023-08-12 RX ORDER — ERTAPENEM SODIUM 1 G/1
1000 INJECTION, POWDER, LYOPHILIZED, FOR SOLUTION INTRAMUSCULAR; INTRAVENOUS EVERY 24 HOURS
Refills: 0 | Status: COMPLETED | OUTPATIENT
Start: 2023-08-12 | End: 2023-08-18

## 2023-08-12 RX ORDER — POLYETHYLENE GLYCOL 3350 17 G/17G
17 POWDER, FOR SOLUTION ORAL
Refills: 0 | Status: DISCONTINUED | OUTPATIENT
Start: 2023-08-12 | End: 2023-09-06

## 2023-08-12 RX ADMIN — Medication 50 MILLIGRAM(S): at 23:02

## 2023-08-12 RX ADMIN — Medication 7 UNIT(S): at 09:06

## 2023-08-12 RX ADMIN — Medication 8: at 09:06

## 2023-08-12 RX ADMIN — Medication 40 MILLIGRAM(S): at 17:22

## 2023-08-12 RX ADMIN — SODIUM CHLORIDE 4 MILLILITER(S): 9 INJECTION INTRAMUSCULAR; INTRAVENOUS; SUBCUTANEOUS at 17:22

## 2023-08-12 RX ADMIN — Medication 3 MILLILITER(S): at 17:22

## 2023-08-12 RX ADMIN — SODIUM CHLORIDE 4 MILLILITER(S): 9 INJECTION INTRAMUSCULAR; INTRAVENOUS; SUBCUTANEOUS at 05:13

## 2023-08-12 RX ADMIN — APIXABAN 5 MILLIGRAM(S): 2.5 TABLET, FILM COATED ORAL at 17:22

## 2023-08-12 RX ADMIN — Medication 0.25 MILLIGRAM(S): at 17:26

## 2023-08-12 RX ADMIN — POLYETHYLENE GLYCOL 3350 17 GRAM(S): 17 POWDER, FOR SOLUTION ORAL at 17:22

## 2023-08-12 RX ADMIN — APIXABAN 5 MILLIGRAM(S): 2.5 TABLET, FILM COATED ORAL at 05:14

## 2023-08-12 RX ADMIN — Medication 12: at 13:37

## 2023-08-12 RX ADMIN — CHLORHEXIDINE GLUCONATE 1 APPLICATION(S): 213 SOLUTION TOPICAL at 13:39

## 2023-08-12 RX ADMIN — Medication 7 UNIT(S): at 13:37

## 2023-08-12 RX ADMIN — MEXILETINE HYDROCHLORIDE 200 MILLIGRAM(S): 150 CAPSULE ORAL at 05:14

## 2023-08-12 RX ADMIN — Medication 8: at 17:25

## 2023-08-12 RX ADMIN — ALBUTEROL 2.5 MILLIGRAM(S): 90 AEROSOL, METERED ORAL at 05:15

## 2023-08-12 RX ADMIN — INSULIN GLARGINE 20 UNIT(S): 100 INJECTION, SOLUTION SUBCUTANEOUS at 00:34

## 2023-08-12 RX ADMIN — Medication 4: at 21:50

## 2023-08-12 RX ADMIN — Medication 7 UNIT(S): at 17:25

## 2023-08-12 RX ADMIN — ERTAPENEM SODIUM 120 MILLIGRAM(S): 1 INJECTION, POWDER, LYOPHILIZED, FOR SOLUTION INTRAMUSCULAR; INTRAVENOUS at 21:28

## 2023-08-12 RX ADMIN — ATORVASTATIN CALCIUM 20 MILLIGRAM(S): 80 TABLET, FILM COATED ORAL at 21:49

## 2023-08-12 RX ADMIN — Medication 1 TABLET(S): at 13:38

## 2023-08-12 RX ADMIN — TIOTROPIUM BROMIDE 2 PUFF(S): 18 CAPSULE ORAL; RESPIRATORY (INHALATION) at 13:42

## 2023-08-12 RX ADMIN — Medication 3 MILLILITER(S): at 13:42

## 2023-08-12 RX ADMIN — Medication 6: at 00:35

## 2023-08-12 RX ADMIN — INSULIN GLARGINE 20 UNIT(S): 100 INJECTION, SOLUTION SUBCUTANEOUS at 21:50

## 2023-08-12 RX ADMIN — MEXILETINE HYDROCHLORIDE 200 MILLIGRAM(S): 150 CAPSULE ORAL at 21:49

## 2023-08-12 RX ADMIN — Medication 0.25 MILLIGRAM(S): at 05:13

## 2023-08-12 RX ADMIN — MEXILETINE HYDROCHLORIDE 200 MILLIGRAM(S): 150 CAPSULE ORAL at 13:38

## 2023-08-12 RX ADMIN — Medication 40 MILLIGRAM(S): at 05:13

## 2023-08-12 NOTE — PROGRESS NOTE ADULT - PROBLEM SELECTOR PLAN 1
- CT chest showed increased bronchial secretions, on RA saturating well, speaking in full sentences, unclear trigger, likely due to infection, or inflammation.     - Sputum cultures , urine legionella, strep   - RVP negative  - hypertonic saline and albuterol neb q6h standing, followed by use of airway clerance device (patient brought her own from home)  - chest PT as tolerated   - budesonide neb q12h standing   - Monitor off antibiotics  - increase methylprednisolone to 40 mg IV bid for now, with taper to 40 mg IV daily in 1-2 days   - pulm following - CT chest showed increased bronchial secretions, on RA saturating well, speaking in full sentences, unclear trigger, likely due to infection, or inflammation.   - RVP negative  - hypertonic saline and albuterol neb q6h standing, followed by use of airway clearance device (patient brought her own from home)  - chest PT as tolerated   - c/w Spiriva daily and budesonide neb q12h standing   - Monitor off antibiotics  - increase methylprednisolone to 40 mg IV bid for now, with taper to 40 mg IV daily in 1-2 days   - f/u Sputum cultures, urine legionella, strep. Patient reports not producing any sputum - advised to collect if cough productive, sample container at bedside.   - pulm following

## 2023-08-12 NOTE — PROGRESS NOTE ADULT - ATTENDING COMMENTS
72 F with hx of asthma on chronic steroids, DM, pAfib on Eliquis, colorectal CA yrs ago s/p colostomy, recent admission for parainfluenza PNA with ESBL proteus in sputum s/p ertapenem course. Pt p/w SOB, found to have bronchial secretions on CT, admitted for asthma exacerbation.    # Asthma exacerbation - May have been caused by steroid taper outpatient vs infection. However currently afebrile mild leukocytosis near baseline (on steroids). Pulmonology following, consultation note reviewed; planning to increase methylprednisolone to 40mg IV BID for now with taper in upcoming days. CT Chest findings reviewed; new mucous secretions in bronchi. Continue airway clearance with duoneb, hypertonic saline, and chest PT. RVP negative. c/w budesonide and Spiriva. Check sputum cultures.     # SIRS - no organ dysfunction. Not convinced there is active infection currently. However would do infectious workup. Blood cultures pending. Obtain sputum cultures, check urine legionella ag. Pt has hx of ESBL proteus in sputum and was treated with ertapenem previously. ID consulted for additional recommendations. Given no clear infectious source right now, will monitor off antibiotics. However low threshold to start empiric abx if fever or worsening leukocytosis.     # T2DM - Start Lantus 20u, admelog 7u, and insulin sliding scale. CC diet. Uptitrate as needed given pt on steroids and has hx of uncontrolled diabetes (A1C 9.1 in June 2023)    # Afib - Continue Eliquis and mexiletine. EKG 8/12/23: NSR@76bpm with PVCs, RBBB, LAFB (present on prior EKG)    # DVT ppx - on Eliquis    # Abx ppx - on Bactrim for Ppx given chronic steroid use    Discussed plan with resident.

## 2023-08-12 NOTE — PROGRESS NOTE ADULT - PROBLEM SELECTOR PLAN 5
PCP PPT: c/w bactrim   diet, dash consistent carb  Dvt: on eliquis  Dispo: pending clinical improvement PCP PPT: c/w bactrim   diet: consistent carb  Dvt: on Eliquis  Dispo: pending clinical improvement

## 2023-08-12 NOTE — CONSULT NOTE ADULT - ASSESSMENT
73 yo PMHx  asthma on chronic steroids, bronchiectasis s/p surgery, allergic rhinitis,  recurrent PNA, PND, tracheomalacia s/p tracheoplasty, ESBL proteus infections (sputum),  diabetes, paroxysmal A-fib on Eliquis, colorectal cancer many years ago status post colostomy presenting with concern for shortness of breath. Prior issues with ESBL/Proteus/yeast in sputum culture and was treated with ertapenem/Benadryl/vancomycin/aztreonam and methylprednisolone.   She was discharged home with a midline and completed a course of antibiotics ertapenem (last dose 06/25) PT now readmitted with severe dyspnea and reports that having had asthma for 61 years she feels like she needs an antibiotic. Reports not tolerating meropenem.    RECOMMENDATIONS  -no evidence of airspace disease so more of a bronchiectasis exacerbation  -recent sputums with ESBL proteus so having tolerated ertapenem will restart  -ertapenem started 8/12  coordination with Dr Sheridan who knows this complicated pt very well    Thank you for consulting us and involving us in the management of this most interesting and challenging case.  We will follow along in the care of this patient. Please call us at [phone] or text me directly on my cell# at [phone] with any concerns.

## 2023-08-12 NOTE — CONSULT NOTE ADULT - SUBJECTIVE AND OBJECTIVE BOX
[facility] DIVISION of INFECTIOUS DISEASE  Scott Gruber MD PhD, Priyanka Nguyen MD, Savannah Boothe MD, Tello Fernandez MD, Richar Tejada MD  and providing coverage with Sudha Francisco MD  Providing Infectious Disease Consultations at [facility], [facility], [place], [place], [facility], [facility]'s    Office# [phone] to schedule follow up appointments  Answering Service for urgent calls or New Consults [phone]  Cell# to text for urgent issues Scott Gruber [phone]     HPI:  73 yo PMHx  asthma on chronic steroids, bronchiectasis s/p surgery, allergic rhinitis,  recurrent PNA, PND, tracheomalacia s/p tracheoplasty, ESBL proteus infections (sputum),  diabetes, paroxysmal A-fib on Eliquis, colorectal cancer many years ago status post colostomy presenting with concern for shortness of breath. Prior issues with ESBL/Proteus/yeast in sputum culture and was treated with ertapenem/Benadryl/vancomycin/aztreonam and methylprednisolone.   She was discharged home with a midline and completed a course of antibiotics ertapenem (last dose 06/25) PT now readmitted with severe dyspnea and reports that having had asthma for 61 years she feels like she needs an antibiotic. Reports not tolerating meropenem.      PAST MEDICAL & SURGICAL HISTORY:  Atrial fibrillation  paroxysmal, on eliquis      Diabetes  Type 2      COPD (chronic obstructive pulmonary disease)      Adrenal insufficiency  Medrol daily for over 50 years      Aortic insufficiency  moderate AR on echo 5/3/2018      Pelvic fracture      Asthma      Tracheobronchomalacia  diagnosed 2015, s/p bronchial thermoplasty 2016 (Dr Zapien); recent bronchoscopy 6/5/2018 revealed no evidence of tracheobronchomalacia in trachea or bronchial tubes      Colorectal cancer  4/2018- last treatment , chemo and radiation      Rectal bleeding      Seizure  x 1 1/7/18      DVT (deep venous thrombosis)  15-20 years ago, took coumadin      TIA (transient ischemic attack)  multiple, last 5 years ago - presents as right-sided weakness      History of partial hysterectomy  30 years ago - fibroids      H/O total knee replacement, bilateral  5 years ago      History of sinus surgery  multiple sinus surgeries      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      H/O pelvic surgery  5 years ago - s/p fracture      History of tracheomalacia  2015 - attempted tracheal stenting ([facility])- course complicated by obstruction, respiratory failure, multiple CPR attempts -  stent discontinued; 10/20/2016 Tracheobronchoplasty (Prolene Mesh) performed at [facility] by Dr Zapien      S/P bronchoscopy  6/5/2018 - [place] [facility] (Dr Zapien) no evidence of tracheobronchomalacia in trachea or bronchial tubes      Rectal bleeding  exam under anesthesia ([facility]) 2/2018          Antimicrobials  trimethoprim  160 mG/sulfamethoxazole 800 mG 1 Tablet(s) Oral daily      Immunological      Other  acetaminophen     Tablet .. 650 milliGRAM(s) Oral every 6 hours PRN  albuterol/ipratropium for Nebulization 3 milliLiter(s) Nebulizer every 6 hours  aluminum hydroxide/magnesium hydroxide/simethicone Suspension 30 milliLiter(s) Oral every 4 hours PRN  apixaban 5 milliGRAM(s) Oral every 12 hours  atorvastatin 20 milliGRAM(s) Oral at bedtime  buDESOnide    Inhalation Suspension 0.25 milliGRAM(s) Inhalation every 12 hours  chlorhexidine 4% Liquid 1 Application(s) Topical daily  dextrose 5%. 1000 milliLiter(s) IV Continuous <Continuous>  dextrose 5%. 1000 milliLiter(s) IV Continuous <Continuous>  dextrose 50% Injectable 25 Gram(s) IV Push once  dextrose 50% Injectable 12.5 Gram(s) IV Push once  dextrose 50% Injectable 25 Gram(s) IV Push once  dextrose Oral Gel 15 Gram(s) Oral once PRN  glucagon  Injectable 1 milliGRAM(s) IntraMuscular once  insulin glargine Injectable (LANTUS) 20 Unit(s) SubCutaneous at bedtime  insulin lispro (ADMELOG) corrective regimen sliding scale   SubCutaneous three times a day before meals  insulin lispro (ADMELOG) corrective regimen sliding scale   SubCutaneous at bedtime  insulin lispro Injectable (ADMELOG) 7 Unit(s) SubCutaneous three times a day before meals  melatonin 3 milliGRAM(s) Oral at bedtime PRN  methylPREDNISolone sodium succinate Injectable 40 milliGRAM(s) IV Push every 12 hours  mexiletine 200 milliGRAM(s) Oral every 8 hours  ondansetron Injectable 4 milliGRAM(s) IV Push every 8 hours PRN  polyethylene glycol 3350 17 Gram(s) Oral two times a day  sodium chloride 3%  Inhalation 4 milliLiter(s) Inhalation every 12 hours  tiotropium 2.5 MICROgram(s) Inhaler 2 Puff(s) Inhalation daily      Allergies    tetanus toxoid (Short breath)  cefepime (Anaphylaxis)  penicillin (Anaphylaxis)  Avelox (Short breath; Pruritus)  Dilaudid (Short breath)  codeine (Short breath)  aspirin (Short breath)  iodine (Short breath; Swelling)  Valium (Short breath)  shellfish (Anaphylaxis)    Intolerances        SOCIAL HISTORY:  Social History:  no smoking, quit drinking, and lives with daughter after she moved from [facility] (11 Aug 2023 22:09)      FAMILY HISTORY:  Family history of asthma    Family history of breast cancer (Sibling)    Family history of diabetes mellitus type II        ROS:    EYES:  Negative  blurry vision or double vision  GASTROINTESTINAL:  Negative for nausea, vomiting, diarrhea  -otherwise negative except for subjective    Vital Signs Last 24 Hrs  T(C): 36.7 (12 Aug 2023 05:17), Max: 37.3 (11 Aug 2023 23:14)  T(F): 98.1 (12 Aug 2023 05:17), Max: 99.1 (11 Aug 2023 23:14)  HR: 82 (12 Aug 2023 12:45) (78 - 98)  BP: 132/67 (12 Aug 2023 12:45) (112/54 - 142/81)  BP(mean): --  RR: 18 (12 Aug 2023 12:45) (18 - 20)  SpO2: 100% (12 Aug 2023 12:45) (95% - 100%)    Parameters below as of 12 Aug 2023 12:45  Patient On (Oxygen Delivery Method): room air        PE:  In no distress  HEENT:  NC, PERRL, sclerae anicteric, conjunctivae clear, EOMI.  Noted proptosis, Sinuses nontender, no nasal exudate.  No buccal or pharyngeal lesions, erythema or exudate  Neck:  Supple, no adenopathy  Lungs:  No accessory muscle use, bilaterally clear to auscultation but dec BS  Cor:  distant  Abd:  Symmetric, normoactive BS.  Soft, nontender, no masses, guarding or rebound.  Liver and spleen not enlarged  Extrem:  No cyanosis or edema  Skin:  No rashes.  Neuro: grossly intact  Musc: moving all limbs freely, no focal deficits        LABS:                        9.7    9.05  )-----------( 179      ( 12 Aug 2023 07:17 )             34.3       WBC Count: 9.05 K/uL (08-12-23 @ 07:17)  WBC Count: 10.56 K/uL (08-11-23 @ 16:05)      08-12    137  |  101  |  16  ----------------------------<  353<H>  4.9   |  21<L>  |  0.67    Ca    8.8      12 Aug 2023 07:21    TPro  5.9<L>  /  Alb  3.5  /  TBili  0.5  /  DBili  x   /  AST  33  /  ALT  16  /  AlkPhos  82  08-12      Creatinine: 0.67 mg/dL (08-12-23 @ 07:21)  Creatinine: 0.80 mg/dL (08-11-23 @ 16:05)      Urinalysis Basic - ( 12 Aug 2023 07:21 )    Color: x / Appearance: x / SG: x / pH: x  Gluc: 353 mg/dL / Ketone: x  / Bili: x / Urobili: x   Blood: x / Protein: x / Nitrite: x   Leuk Esterase: x / RBC: x / WBC x   Sq Epi: x / Non Sq Epi: x / Bacteria: x              MICROBIOLOGY:      RADIOLOGY & ADDITIONAL STUDIES:    --< from: CT Chest No Cont (08.11.23 @ 17:50) >    ACC: [id number] EXAM:  CT CHEST   ORDERED BY:  HEBERT SUBRAMANIAN     PROCEDURE DATE:  08/11/2023          INTERPRETATION:  CLINICAL INFORMATION: Worsening dyspnea.    COMPARISON: CT chest 8/1/2023.    CONTRAST/COMPLICATIONS:  IV Contrast: NONE  Oral Contrast: NONE  Complications: None reported at time of study completion    PROCEDURE:  CT scan of the chest was obtained without intravenous contrast.    FINDINGS:    MEDIASTINUM/LYMPH NODES: No lymphadenopathy. Small hiatal hernia.    HEART/VASCULATURE: The heart is normal in size. Status post ascending   aortic and aortic valve replacement. Known residual dissection flap   involving the arch and descending thoracic aorta is better seen on the   contrast-enhanced study from 8/1/2023. Qualitatively mild coronary   arterial calcification. Mitral annular calcification. No pericardial   effusion.    AIRWAYS/LUNGS/PLEURA: Apical paraseptal emphysema. Retained secretions in   the bronchus intermedius, new from 8/1/2023. The central airways are   otherwise patent. Faint diffuse bilateral groundglass opacities. Mild   right lower lobe bronchiectasis.. Right middle lobe and right lower lobe   tree-in-bud opacities. No pleural effusion or pneumothorax. Calcified   granulomas.    UPPER ABDOMEN: Right hepatic lobe punctate granuloma. Indeterminate,   partially calcified left renal nodule is unchanged since the prior study.   Several stable pancreatic head, body and tail cysts measuring up to 1.6   cm.    BONES/SOFT TISSUES: Sternotomy wires are midline and intact. Scattered   bone islands. No aggressive osseous lesion.    IMPRESSION:  Compared to 8/1/2023:    New mucous secretions in the bronchus intermedius.    Unchanged infectious/inflammatory small airways disease in the right   middle/lower lobes with multifocal small tree-in-bud nodules.    Status post ascending aortic and aortic valve replacement, with residual   dissection flap better seen on the prior study.    Stable indeterminate small left renal nodule and numerous pancreatic   cysts.    --- End of Report ---           YOANA SMALLS MD; Resident Radiologist  This document has been electronically signed.  REFUGIO LEACH M.D., Attending Radiologist  This document has been electronically signed. Aug 11 2023  6:28PM    < end of copied text >

## 2023-08-12 NOTE — PROGRESS NOTE ADULT - PROBLEM SELECTOR PLAN 2
-meet SIRS  -f/u blood culture, urine culture  -IVF   -monitor off abx for now  -ID c/s given complex PNA hx

## 2023-08-12 NOTE — PROGRESS NOTE ADULT - PROBLEM SELECTOR PLAN 4
-on 25-30 lantus at home, and 7-20 mealtime as well  -ISS moderate risk   -change to 20 lantus, and 7 meal time for now.  - Will likely need to uptitrate given increase in steroids and hx of uncontrolled diabetes. Hgb A1c 9.1   -on 25-30 Lantus at home, and 7-20 mealtime as well  -ISS moderate risk   -c/w 20 lantus, and 7 meal time for now.  - Will likely need to uptitrate given increase in steroids and hx of uncontrolled diabetes.

## 2023-08-12 NOTE — PROGRESS NOTE ADULT - PROBLEM SELECTOR PLAN 3
-c/w Eliquis, Mexiletine  - EKG ordered -c/w Eliquis, Mexiletine  EKG 8/12/23: NSR@76bpm with PVCs, RBBB, LAFB (present on prior EKG)

## 2023-08-12 NOTE — PROGRESS NOTE ADULT - SUBJECTIVE AND OBJECTIVE BOX
Lázaro Molina, PGY-1  Please contact on Teams    RAYRAY RODRIGUEZ  72y  Female      SUBJECTIVE/INTERVAL EVENTS: No acute events. Pt seen and examined at bedside.    PHYSICAL EXAM:  GENERAL: NAD, lying comfortably in bed   HEAD:  Atraumatic, Normocephalic  EYES: EOMI b/l, PERRLA b/l, conjunctiva and sclera clear  NECK: Supple, No JVD, No LAD   CHEST/LUNG: rhonchi throughout the lung field bilaterally  HEART: Regular rate and rhythm; S1 and S2 present, + Systolic ejection murmur  ABDOMEN: Soft, Nontender, Nondistended; Bowel sounds present  EXTREMITIES:  2+ Peripheral Pulses, No clubbing, cyanosis, or edema  NEURO: AAOx3, non-focal   SKIN: No rashes or lesions    Vital Signs Last 24 Hrs  T(C): 36.7 (12 Aug 2023 05:17), Max: 37.3 (11 Aug 2023 23:14)  T(F): 98.1 (12 Aug 2023 05:17), Max: 99.1 (11 Aug 2023 23:14)  HR: 84 (12 Aug 2023 05:17) (78 - 98)  BP: 112/54 (12 Aug 2023 05:17) (112/54 - 142/81)  BP(mean): --  RR: 18 (12 Aug 2023 05:17) (18 - 22)  SpO2: 100% (12 Aug 2023 05:17) (95% - 100%)    Parameters below as of 12 Aug 2023 05:17  Patient On (Oxygen Delivery Method): room air        Consultant(s) Notes Reviewed:  [x] YES  [ ] NO  Care Discussed with Consultants/Other Providers [x] YES  [ ] NO    LABS:                        10.0   10.56 )-----------( 227      ( 11 Aug 2023 16:05 )             35.8                               139    |  101    |  14                  Calcium: 8.6   / iCa: x      ( @ 16:05)    ----------------------------<  272       Magnesium: x                                4.4     |  22     |  0.80             Phosphorous: x        TPro  6.1    /  Alb  4.3    /  TBili  0.6    /  DBili  x      /  AST  36     /  ALT  18     /  AlkPhos  86     11 Aug 2023 16:05    Urinalysis Basic - ( 11 Aug 2023 19:17 )    Color: Yellow / Appearance: Clear / S.020 / pH: x  Gluc: x / Ketone: Small  / Bili: Negative / Urobili: 2 mg/dL   Blood: x / Protein: 30 mg/dL / Nitrite: Negative   Leuk Esterase: Small / RBC: 4 /hpf / WBC 6 /HPF   Sq Epi: x / Non Sq Epi: x / Bacteria: Negative        RADIOLOGY & ADDITIONAL TESTS:    Imaging Personally Reviewed:  [x] YES  [ ] NO    MEDICATIONS  (STANDING):  albuterol/ipratropium for Nebulization 3 milliLiter(s) Nebulizer every 6 hours  apixaban 5 milliGRAM(s) Oral every 12 hours  atorvastatin 20 milliGRAM(s) Oral at bedtime  buDESOnide    Inhalation Suspension 0.25 milliGRAM(s) Inhalation every 12 hours  dextrose 5%. 1000 milliLiter(s) (100 mL/Hr) IV Continuous <Continuous>  dextrose 5%. 1000 milliLiter(s) (50 mL/Hr) IV Continuous <Continuous>  dextrose 50% Injectable 25 Gram(s) IV Push once  dextrose 50% Injectable 12.5 Gram(s) IV Push once  dextrose 50% Injectable 25 Gram(s) IV Push once  glucagon  Injectable 1 milliGRAM(s) IntraMuscular once  insulin glargine Injectable (LANTUS) 20 Unit(s) SubCutaneous at bedtime  insulin lispro (ADMELOG) corrective regimen sliding scale   SubCutaneous three times a day before meals  insulin lispro (ADMELOG) corrective regimen sliding scale   SubCutaneous at bedtime  insulin lispro Injectable (ADMELOG) 7 Unit(s) SubCutaneous three times a day before meals  methylPREDNISolone sodium succinate Injectable 40 milliGRAM(s) IV Push every 12 hours  mexiletine 200 milliGRAM(s) Oral every 8 hours  sodium chloride 3%  Inhalation 4 milliLiter(s) Inhalation every 12 hours  tiotropium 2.5 MICROgram(s) Inhaler 2 Puff(s) Inhalation daily  trimethoprim  160 mG/sulfamethoxazole 800 mG 1 Tablet(s) Oral daily    MEDICATIONS  (PRN):  acetaminophen     Tablet .. 650 milliGRAM(s) Oral every 6 hours PRN Temp greater or equal to 38C (100.4F), Mild Pain (1 - 3)  aluminum hydroxide/magnesium hydroxide/simethicone Suspension 30 milliLiter(s) Oral every 4 hours PRN Dyspepsia  dextrose Oral Gel 15 Gram(s) Oral once PRN Blood Glucose LESS THAN 70 milliGRAM(s)/deciliter  melatonin 3 milliGRAM(s) Oral at bedtime PRN Insomnia  ondansetron Injectable 4 milliGRAM(s) IV Push every 8 hours PRN Nausea and/or Vomiting      HEALTH ISSUES - PROBLEM Dx:  Atrial fibrillation  paroxysmal, on eliquis    Diabetes mellitus    Prophylactic measure    Acute asthma exacerbation    SIRS without infection or organ dysfunction

## 2023-08-13 DIAGNOSIS — J47.1 BRONCHIECTASIS WITH (ACUTE) EXACERBATION: ICD-10-CM

## 2023-08-13 LAB
ALBUMIN SERPL ELPH-MCNC: 2.6 G/DL — LOW (ref 3.3–5)
ALP SERPL-CCNC: 74 U/L — SIGNIFICANT CHANGE UP (ref 40–120)
ALT FLD-CCNC: 15 U/L — SIGNIFICANT CHANGE UP (ref 10–45)
ANION GAP SERPL CALC-SCNC: 12 MMOL/L — SIGNIFICANT CHANGE UP (ref 5–17)
AST SERPL-CCNC: 50 U/L — HIGH (ref 10–40)
BASOPHILS # BLD AUTO: 0.06 K/UL — SIGNIFICANT CHANGE UP (ref 0–0.2)
BASOPHILS NFR BLD AUTO: 0.4 % — SIGNIFICANT CHANGE UP (ref 0–2)
BILIRUB SERPL-MCNC: 0.8 MG/DL — SIGNIFICANT CHANGE UP (ref 0.2–1.2)
BLD GP AB SCN SERPL QL: NEGATIVE — SIGNIFICANT CHANGE UP
BUN SERPL-MCNC: 21 MG/DL — SIGNIFICANT CHANGE UP (ref 7–23)
CALCIUM SERPL-MCNC: 8.6 MG/DL — SIGNIFICANT CHANGE UP (ref 8.4–10.5)
CHLORIDE SERPL-SCNC: 100 MMOL/L — SIGNIFICANT CHANGE UP (ref 96–108)
CO2 SERPL-SCNC: 18 MMOL/L — LOW (ref 22–31)
CREAT SERPL-MCNC: 0.68 MG/DL — SIGNIFICANT CHANGE UP (ref 0.5–1.3)
EGFR: 91 ML/MIN/1.73M2 — SIGNIFICANT CHANGE UP
EOSINOPHIL # BLD AUTO: 0 K/UL — SIGNIFICANT CHANGE UP (ref 0–0.5)
EOSINOPHIL NFR BLD AUTO: 0 % — SIGNIFICANT CHANGE UP (ref 0–6)
GLUCOSE SERPL-MCNC: 365 MG/DL — HIGH (ref 70–99)
HCT VFR BLD CALC: 31.7 % — LOW (ref 34.5–45)
HGB BLD-MCNC: 8.6 G/DL — LOW (ref 11.5–15.5)
IMM GRANULOCYTES NFR BLD AUTO: 1.2 % — HIGH (ref 0–0.9)
LEGIONELLA AG UR QL: NEGATIVE — SIGNIFICANT CHANGE UP
LYMPHOCYTES # BLD AUTO: 1.06 K/UL — SIGNIFICANT CHANGE UP (ref 1–3.3)
LYMPHOCYTES # BLD AUTO: 6.3 % — LOW (ref 13–44)
MAGNESIUM SERPL-MCNC: 2 MG/DL — SIGNIFICANT CHANGE UP (ref 1.6–2.6)
MCHC RBC-ENTMCNC: 23.2 PG — LOW (ref 27–34)
MCHC RBC-ENTMCNC: 27.1 GM/DL — LOW (ref 32–36)
MCV RBC AUTO: 85.4 FL — SIGNIFICANT CHANGE UP (ref 80–100)
MONOCYTES # BLD AUTO: 1.01 K/UL — HIGH (ref 0–0.9)
MONOCYTES NFR BLD AUTO: 6 % — SIGNIFICANT CHANGE UP (ref 2–14)
MRSA PCR RESULT.: SIGNIFICANT CHANGE UP
NEUTROPHILS # BLD AUTO: 14.4 K/UL — HIGH (ref 1.8–7.4)
NEUTROPHILS NFR BLD AUTO: 86.1 % — HIGH (ref 43–77)
NRBC # BLD: 0 /100 WBCS — SIGNIFICANT CHANGE UP (ref 0–0)
PHOSPHATE SERPL-MCNC: 3.7 MG/DL — SIGNIFICANT CHANGE UP (ref 2.5–4.5)
PLATELET # BLD AUTO: 180 K/UL — SIGNIFICANT CHANGE UP (ref 150–400)
POTASSIUM SERPL-MCNC: 5.2 MMOL/L — SIGNIFICANT CHANGE UP (ref 3.5–5.3)
POTASSIUM SERPL-SCNC: 5.2 MMOL/L — SIGNIFICANT CHANGE UP (ref 3.5–5.3)
PROT SERPL-MCNC: 5.4 G/DL — LOW (ref 6–8.3)
RBC # BLD: 3.71 M/UL — LOW (ref 3.8–5.2)
RBC # FLD: 25.8 % — HIGH (ref 10.3–14.5)
RH IG SCN BLD-IMP: POSITIVE — SIGNIFICANT CHANGE UP
S AUREUS DNA NOSE QL NAA+PROBE: DETECTED
S PNEUM AG UR QL: NEGATIVE — SIGNIFICANT CHANGE UP
SODIUM SERPL-SCNC: 130 MMOL/L — LOW (ref 135–145)
WBC # BLD: 16.73 K/UL — HIGH (ref 3.8–10.5)
WBC # FLD AUTO: 16.73 K/UL — HIGH (ref 3.8–10.5)

## 2023-08-13 PROCEDURE — 99233 SBSQ HOSP IP/OBS HIGH 50: CPT | Mod: GC

## 2023-08-13 RX ORDER — INSULIN LISPRO 100/ML
9 VIAL (ML) SUBCUTANEOUS
Refills: 0 | Status: DISCONTINUED | OUTPATIENT
Start: 2023-08-13 | End: 2023-08-15

## 2023-08-13 RX ORDER — INSULIN GLARGINE 100 [IU]/ML
25 INJECTION, SOLUTION SUBCUTANEOUS AT BEDTIME
Refills: 0 | Status: DISCONTINUED | OUTPATIENT
Start: 2023-08-13 | End: 2023-08-13

## 2023-08-13 RX ORDER — INSULIN GLARGINE 100 [IU]/ML
27 INJECTION, SOLUTION SUBCUTANEOUS AT BEDTIME
Refills: 0 | Status: DISCONTINUED | OUTPATIENT
Start: 2023-08-13 | End: 2023-08-15

## 2023-08-13 RX ADMIN — Medication 10: at 08:44

## 2023-08-13 RX ADMIN — Medication 2: at 17:55

## 2023-08-13 RX ADMIN — CHLORHEXIDINE GLUCONATE 1 APPLICATION(S): 213 SOLUTION TOPICAL at 13:27

## 2023-08-13 RX ADMIN — Medication 40 MILLIGRAM(S): at 05:56

## 2023-08-13 RX ADMIN — Medication 9 UNIT(S): at 17:56

## 2023-08-13 RX ADMIN — Medication 0.25 MILLIGRAM(S): at 05:55

## 2023-08-13 RX ADMIN — APIXABAN 5 MILLIGRAM(S): 2.5 TABLET, FILM COATED ORAL at 17:56

## 2023-08-13 RX ADMIN — Medication 3 MILLILITER(S): at 00:07

## 2023-08-13 RX ADMIN — Medication 3 MILLILITER(S): at 05:56

## 2023-08-13 RX ADMIN — Medication 40 MILLIGRAM(S): at 17:57

## 2023-08-13 RX ADMIN — ERTAPENEM SODIUM 120 MILLIGRAM(S): 1 INJECTION, POWDER, LYOPHILIZED, FOR SOLUTION INTRAMUSCULAR; INTRAVENOUS at 18:01

## 2023-08-13 RX ADMIN — MEXILETINE HYDROCHLORIDE 200 MILLIGRAM(S): 150 CAPSULE ORAL at 13:27

## 2023-08-13 RX ADMIN — Medication 0.25 MILLIGRAM(S): at 17:57

## 2023-08-13 RX ADMIN — MEXILETINE HYDROCHLORIDE 200 MILLIGRAM(S): 150 CAPSULE ORAL at 05:55

## 2023-08-13 RX ADMIN — SODIUM CHLORIDE 4 MILLILITER(S): 9 INJECTION INTRAMUSCULAR; INTRAVENOUS; SUBCUTANEOUS at 17:56

## 2023-08-13 RX ADMIN — Medication 3 MILLILITER(S): at 17:57

## 2023-08-13 RX ADMIN — MEXILETINE HYDROCHLORIDE 200 MILLIGRAM(S): 150 CAPSULE ORAL at 21:37

## 2023-08-13 RX ADMIN — POLYETHYLENE GLYCOL 3350 17 GRAM(S): 17 POWDER, FOR SOLUTION ORAL at 17:58

## 2023-08-13 RX ADMIN — SODIUM CHLORIDE 4 MILLILITER(S): 9 INJECTION INTRAMUSCULAR; INTRAVENOUS; SUBCUTANEOUS at 05:57

## 2023-08-13 RX ADMIN — ATORVASTATIN CALCIUM 20 MILLIGRAM(S): 80 TABLET, FILM COATED ORAL at 21:37

## 2023-08-13 RX ADMIN — Medication 12: at 13:04

## 2023-08-13 RX ADMIN — Medication 30 MILLILITER(S): at 21:48

## 2023-08-13 RX ADMIN — Medication 1 TABLET(S): at 13:26

## 2023-08-13 RX ADMIN — INSULIN GLARGINE 27 UNIT(S): 100 INJECTION, SOLUTION SUBCUTANEOUS at 21:41

## 2023-08-13 RX ADMIN — POLYETHYLENE GLYCOL 3350 17 GRAM(S): 17 POWDER, FOR SOLUTION ORAL at 05:55

## 2023-08-13 RX ADMIN — Medication 3 MILLILITER(S): at 13:26

## 2023-08-13 RX ADMIN — Medication 9 UNIT(S): at 13:05

## 2023-08-13 RX ADMIN — Medication 7 UNIT(S): at 08:44

## 2023-08-13 RX ADMIN — APIXABAN 5 MILLIGRAM(S): 2.5 TABLET, FILM COATED ORAL at 05:56

## 2023-08-13 RX ADMIN — TIOTROPIUM BROMIDE 2 PUFF(S): 18 CAPSULE ORAL; RESPIRATORY (INHALATION) at 13:26

## 2023-08-13 NOTE — PROGRESS NOTE ADULT - ATTENDING COMMENTS
72 F with hx of asthma on chronic steroids, DM, pAfib on Eliquis, colorectal CA yrs ago s/p colostomy, recent admission for parainfluenza PNA with ESBL proteus in sputum s/p ertapenem course. Pt p/w SOB, found to have bronchial secretions on CT, admitted for asthma exacerbation.     # Asthma exacerbation - May have been caused by steroid taper outpatient vs infection. Currently afebrile mild leukocytosis near baseline (on steroids). Pulmonology following, c/w methylprednisolone to 40mg IV BID for now with taper in upcoming days. CT Chest findings reviewed; new mucous secretions in bronchi. Continue airway clearance with duoneb, hypertonic saline, and chest PT. RVP negative. c/w budesonide and Spiriva. Check sputum cultures - trying to induce with hypertonic saline.     # SIRS - Blood cultures NGTD. Urine Legionella Neg. Obtain sputum cultures. Pt has hx of ESBL proteus in sputum and was treated with ertapenem previously. ID consulted for additional recommendations- given recent ESBL Proteus infection, will resume Ertapenem empirically.    # T2DM - CC diet. Uptitrate as needed given pt on steroids and has hx of uncontrolled diabetes (A1C 9.1 in June 2023). c/w Lantus 27units QHS and Admelog 9 units TIDCC.    # Afib - Continue Eliquis and mexiletine. EKG 8/12/23: NSR@76bpm with PVCs, RBBB, LAFB (present on prior EKG)    # DVT ppx - on Eliquis    # Abx ppx - on Bactrim for Ppx given chronic steroid use

## 2023-08-13 NOTE — PROGRESS NOTE ADULT - SUBJECTIVE AND OBJECTIVE BOX
Lázaro Molina, PGY-1  Please contact on Teams    RAYRAY RODRIGUEZ  72y  Female    Reason for Admission:       SUBJECTIVE/INTERVAL EVENTS: No acute events. Pt seen and examined at bedside.    PHYSICAL EXAM:  GENERAL: NAD, lying comfortably in bed   HEAD:  Atraumatic, Normocephalic  EYES: EOMI b/l, PERRLA b/l, conjunctiva and sclera clear  NECK: Supple, No JVD, No LAD   CHEST/LUNG: rhonchi throughout the lung field bilaterally  HEART: Regular rate and rhythm; S1 and S2 present, + Systolic ejection murmur  ABDOMEN: Soft, Nontender, Nondistended; Bowel sounds present  EXTREMITIES:  2+ Peripheral Pulses, No clubbing, cyanosis, or edema  NEURO: AAOx3, non-focal   SKIN: No rashes or lesions    Vital Signs Last 24 Hrs  T(C): 36.6 (13 Aug 2023 04:47), Max: 36.8 (12 Aug 2023 21:30)  T(F): 97.9 (13 Aug 2023 04:47), Max: 98.3 (12 Aug 2023 21:30)  HR: 71 (13 Aug 2023 04:47) (71 - 89)  BP: 121/77 (13 Aug 2023 04:47) (100/63 - 132/67)  BP(mean): --  RR: 18 (13 Aug 2023 04:47) (18 - 18)  SpO2: 95% (13 Aug 2023 04:47) (95% - 100%)    Parameters below as of 13 Aug 2023 04:47  Patient On (Oxygen Delivery Method): room air        Consultant(s) Notes Reviewed:  [x] YES  [ ] NO  Care Discussed with Consultants/Other Providers [x] YES  [ ] NO    LABS:                        9.7    9.05  )-----------( 179      ( 12 Aug 2023 07:17 )             34.3                         10.0   10.56 )-----------( 227      ( 11 Aug 2023 16:05 )             35.8                               137    |  101    |  16                  Calcium: 8.8   / iCa: x      (08-12 @ 07:21)    ----------------------------<  353       Magnesium: x                                4.9     |  21     |  0.67             Phosphorous: x        TPro  5.9    /  Alb  3.5    /  TBili  0.5    /  DBili  x      /  AST  33     /  ALT  16     /  AlkPhos  82     12 Aug 2023 07:21    Urinalysis Basic - ( 12 Aug 2023 07:21 )    Color: x / Appearance: x / SG: x / pH: x  Gluc: 353 mg/dL / Ketone: x  / Bili: x / Urobili: x   Blood: x / Protein: x / Nitrite: x   Leuk Esterase: x / RBC: x / WBC x   Sq Epi: x / Non Sq Epi: x / Bacteria: x        RADIOLOGY & ADDITIONAL TESTS:    Imaging Personally Reviewed:  [x] YES  [ ] NO    MEDICATIONS  (STANDING):  albuterol/ipratropium for Nebulization 3 milliLiter(s) Nebulizer every 6 hours  apixaban 5 milliGRAM(s) Oral every 12 hours  atorvastatin 20 milliGRAM(s) Oral at bedtime  buDESOnide    Inhalation Suspension 0.25 milliGRAM(s) Inhalation every 12 hours  chlorhexidine 4% Liquid 1 Application(s) Topical daily  dextrose 5%. 1000 milliLiter(s) (50 mL/Hr) IV Continuous <Continuous>  dextrose 5%. 1000 milliLiter(s) (100 mL/Hr) IV Continuous <Continuous>  dextrose 50% Injectable 25 Gram(s) IV Push once  dextrose 50% Injectable 12.5 Gram(s) IV Push once  dextrose 50% Injectable 25 Gram(s) IV Push once  ertapenem  IVPB 1000 milliGRAM(s) IV Intermittent every 24 hours  glucagon  Injectable 1 milliGRAM(s) IntraMuscular once  insulin glargine Injectable (LANTUS) 20 Unit(s) SubCutaneous at bedtime  insulin lispro (ADMELOG) corrective regimen sliding scale   SubCutaneous three times a day before meals  insulin lispro (ADMELOG) corrective regimen sliding scale   SubCutaneous at bedtime  insulin lispro Injectable (ADMELOG) 7 Unit(s) SubCutaneous three times a day before meals  methylPREDNISolone sodium succinate Injectable 40 milliGRAM(s) IV Push every 12 hours  mexiletine 200 milliGRAM(s) Oral every 8 hours  polyethylene glycol 3350 17 Gram(s) Oral two times a day  sodium chloride 3%  Inhalation 4 milliLiter(s) Inhalation every 12 hours  tiotropium 2.5 MICROgram(s) Inhaler 2 Puff(s) Inhalation daily  trimethoprim  160 mG/sulfamethoxazole 800 mG 1 Tablet(s) Oral daily    MEDICATIONS  (PRN):  acetaminophen     Tablet .. 650 milliGRAM(s) Oral every 6 hours PRN Temp greater or equal to 38C (100.4F), Mild Pain (1 - 3)  aluminum hydroxide/magnesium hydroxide/simethicone Suspension 30 milliLiter(s) Oral every 4 hours PRN Dyspepsia  dextrose Oral Gel 15 Gram(s) Oral once PRN Blood Glucose LESS THAN 70 milliGRAM(s)/deciliter  melatonin 3 milliGRAM(s) Oral at bedtime PRN Insomnia  ondansetron Injectable 4 milliGRAM(s) IV Push every 8 hours PRN Nausea and/or Vomiting      HEALTH ISSUES - PROBLEM Dx:  Atrial fibrillation  paroxysmal, on eliquis    Diabetes mellitus    Prophylactic measure    Acute asthma exacerbation    SIRS without infection or organ dysfunction               Lázaro Molina, PGY-1  Please contact on Teams    RAYRAY RODRIGUEZ  72y  Female    Reason for Admission:       SUBJECTIVE/INTERVAL EVENTS: No acute events. Pt seen and examined at bedside.    PHYSICAL EXAM:  GENERAL: NAD, lying comfortably in bed   HEAD:  Atraumatic, Normocephalic  EYES: EOMI b/l, PERRLA b/l, conjunctiva and sclera clear  NECK: Supple, No JVD, No LAD   CHEST/LUNG: clear lungs to auscultation  HEART: Regular rate and rhythm; S1 and S2 present, + Systolic ejection murmur  ABDOMEN: Soft, Nontender, Nondistended; Bowel sounds present  EXTREMITIES:  2+ Peripheral Pulses, No clubbing, cyanosis, or edema  NEURO: AAOx3, non-focal   SKIN: No rashes or lesions    Vital Signs Last 24 Hrs  T(C): 36.6 (13 Aug 2023 04:47), Max: 36.8 (12 Aug 2023 21:30)  T(F): 97.9 (13 Aug 2023 04:47), Max: 98.3 (12 Aug 2023 21:30)  HR: 71 (13 Aug 2023 04:47) (71 - 89)  BP: 121/77 (13 Aug 2023 04:47) (100/63 - 132/67)  BP(mean): --  RR: 18 (13 Aug 2023 04:47) (18 - 18)  SpO2: 95% (13 Aug 2023 04:47) (95% - 100%)    Parameters below as of 13 Aug 2023 04:47  Patient On (Oxygen Delivery Method): room air        Consultant(s) Notes Reviewed:  [x] YES  [ ] NO  Care Discussed with Consultants/Other Providers [x] YES  [ ] NO    LABS:                        9.7    9.05  )-----------( 179      ( 12 Aug 2023 07:17 )             34.3                         10.0   10.56 )-----------( 227      ( 11 Aug 2023 16:05 )             35.8                               137    |  101    |  16                  Calcium: 8.8   / iCa: x      (08-12 @ 07:21)    ----------------------------<  353       Magnesium: x                                4.9     |  21     |  0.67             Phosphorous: x        TPro  5.9    /  Alb  3.5    /  TBili  0.5    /  DBili  x      /  AST  33     /  ALT  16     /  AlkPhos  82     12 Aug 2023 07:21    Urinalysis Basic - ( 12 Aug 2023 07:21 )    Color: x / Appearance: x / SG: x / pH: x  Gluc: 353 mg/dL / Ketone: x  / Bili: x / Urobili: x   Blood: x / Protein: x / Nitrite: x   Leuk Esterase: x / RBC: x / WBC x   Sq Epi: x / Non Sq Epi: x / Bacteria: x        RADIOLOGY & ADDITIONAL TESTS:    Imaging Personally Reviewed:  [x] YES  [ ] NO    MEDICATIONS  (STANDING):  albuterol/ipratropium for Nebulization 3 milliLiter(s) Nebulizer every 6 hours  apixaban 5 milliGRAM(s) Oral every 12 hours  atorvastatin 20 milliGRAM(s) Oral at bedtime  buDESOnide    Inhalation Suspension 0.25 milliGRAM(s) Inhalation every 12 hours  chlorhexidine 4% Liquid 1 Application(s) Topical daily  dextrose 5%. 1000 milliLiter(s) (50 mL/Hr) IV Continuous <Continuous>  dextrose 5%. 1000 milliLiter(s) (100 mL/Hr) IV Continuous <Continuous>  dextrose 50% Injectable 25 Gram(s) IV Push once  dextrose 50% Injectable 12.5 Gram(s) IV Push once  dextrose 50% Injectable 25 Gram(s) IV Push once  ertapenem  IVPB 1000 milliGRAM(s) IV Intermittent every 24 hours  glucagon  Injectable 1 milliGRAM(s) IntraMuscular once  insulin glargine Injectable (LANTUS) 20 Unit(s) SubCutaneous at bedtime  insulin lispro (ADMELOG) corrective regimen sliding scale   SubCutaneous three times a day before meals  insulin lispro (ADMELOG) corrective regimen sliding scale   SubCutaneous at bedtime  insulin lispro Injectable (ADMELOG) 7 Unit(s) SubCutaneous three times a day before meals  methylPREDNISolone sodium succinate Injectable 40 milliGRAM(s) IV Push every 12 hours  mexiletine 200 milliGRAM(s) Oral every 8 hours  polyethylene glycol 3350 17 Gram(s) Oral two times a day  sodium chloride 3%  Inhalation 4 milliLiter(s) Inhalation every 12 hours  tiotropium 2.5 MICROgram(s) Inhaler 2 Puff(s) Inhalation daily  trimethoprim  160 mG/sulfamethoxazole 800 mG 1 Tablet(s) Oral daily    MEDICATIONS  (PRN):  acetaminophen     Tablet .. 650 milliGRAM(s) Oral every 6 hours PRN Temp greater or equal to 38C (100.4F), Mild Pain (1 - 3)  aluminum hydroxide/magnesium hydroxide/simethicone Suspension 30 milliLiter(s) Oral every 4 hours PRN Dyspepsia  dextrose Oral Gel 15 Gram(s) Oral once PRN Blood Glucose LESS THAN 70 milliGRAM(s)/deciliter  melatonin 3 milliGRAM(s) Oral at bedtime PRN Insomnia  ondansetron Injectable 4 milliGRAM(s) IV Push every 8 hours PRN Nausea and/or Vomiting      HEALTH ISSUES - PROBLEM Dx:  Atrial fibrillation  paroxysmal, on eliquis    Diabetes mellitus    Prophylactic measure    Acute asthma exacerbation    SIRS without infection or organ dysfunction               Lázaro Molina, PGY-1  Please contact on Teams    RAYRAY RODRIGUEZ  72y  Female    Reason for Admission:       SUBJECTIVE/INTERVAL EVENTS: No acute events. Pt seen and examined at bedside.    PHYSICAL EXAM:  GENERAL: NAD, lying comfortably in bed   HEAD:  Atraumatic, Normocephalic  EYES: EOMI b/l, PERRLA b/l, conjunctiva and sclera clear  NECK: Supple, No JVD, No LAD   CHEST/LUNG: clear lungs to auscultation, improved from prior  HEART: Regular rate and rhythm; S1 and S2 present, + Systolic ejection murmur  ABDOMEN: Soft, Nontender, Nondistended; Bowel sounds present  EXTREMITIES:  2+ Peripheral Pulses, No clubbing, cyanosis, or edema  NEURO: AAOx3, non-focal   SKIN: No rashes or lesions    Vital Signs Last 24 Hrs  T(C): 36.6 (13 Aug 2023 04:47), Max: 36.8 (12 Aug 2023 21:30)  T(F): 97.9 (13 Aug 2023 04:47), Max: 98.3 (12 Aug 2023 21:30)  HR: 71 (13 Aug 2023 04:47) (71 - 89)  BP: 121/77 (13 Aug 2023 04:47) (100/63 - 132/67)  BP(mean): --  RR: 18 (13 Aug 2023 04:47) (18 - 18)  SpO2: 95% (13 Aug 2023 04:47) (95% - 100%)    Parameters below as of 13 Aug 2023 04:47  Patient On (Oxygen Delivery Method): room air        Consultant(s) Notes Reviewed:  [x] YES  [ ] NO  Care Discussed with Consultants/Other Providers [x] YES  [ ] NO    LABS:                        9.7    9.05  )-----------( 179      ( 12 Aug 2023 07:17 )             34.3                         10.0   10.56 )-----------( 227      ( 11 Aug 2023 16:05 )             35.8                               137    |  101    |  16                  Calcium: 8.8   / iCa: x      (08-12 @ 07:21)    ----------------------------<  353       Magnesium: x                                4.9     |  21     |  0.67             Phosphorous: x        TPro  5.9    /  Alb  3.5    /  TBili  0.5    /  DBili  x      /  AST  33     /  ALT  16     /  AlkPhos  82     12 Aug 2023 07:21    Urinalysis Basic - ( 12 Aug 2023 07:21 )    Color: x / Appearance: x / SG: x / pH: x  Gluc: 353 mg/dL / Ketone: x  / Bili: x / Urobili: x   Blood: x / Protein: x / Nitrite: x   Leuk Esterase: x / RBC: x / WBC x   Sq Epi: x / Non Sq Epi: x / Bacteria: x        RADIOLOGY & ADDITIONAL TESTS:    Imaging Personally Reviewed:  [x] YES  [ ] NO    MEDICATIONS  (STANDING):  albuterol/ipratropium for Nebulization 3 milliLiter(s) Nebulizer every 6 hours  apixaban 5 milliGRAM(s) Oral every 12 hours  atorvastatin 20 milliGRAM(s) Oral at bedtime  buDESOnide    Inhalation Suspension 0.25 milliGRAM(s) Inhalation every 12 hours  chlorhexidine 4% Liquid 1 Application(s) Topical daily  dextrose 5%. 1000 milliLiter(s) (50 mL/Hr) IV Continuous <Continuous>  dextrose 5%. 1000 milliLiter(s) (100 mL/Hr) IV Continuous <Continuous>  dextrose 50% Injectable 25 Gram(s) IV Push once  dextrose 50% Injectable 12.5 Gram(s) IV Push once  dextrose 50% Injectable 25 Gram(s) IV Push once  ertapenem  IVPB 1000 milliGRAM(s) IV Intermittent every 24 hours  glucagon  Injectable 1 milliGRAM(s) IntraMuscular once  insulin glargine Injectable (LANTUS) 20 Unit(s) SubCutaneous at bedtime  insulin lispro (ADMELOG) corrective regimen sliding scale   SubCutaneous three times a day before meals  insulin lispro (ADMELOG) corrective regimen sliding scale   SubCutaneous at bedtime  insulin lispro Injectable (ADMELOG) 7 Unit(s) SubCutaneous three times a day before meals  methylPREDNISolone sodium succinate Injectable 40 milliGRAM(s) IV Push every 12 hours  mexiletine 200 milliGRAM(s) Oral every 8 hours  polyethylene glycol 3350 17 Gram(s) Oral two times a day  sodium chloride 3%  Inhalation 4 milliLiter(s) Inhalation every 12 hours  tiotropium 2.5 MICROgram(s) Inhaler 2 Puff(s) Inhalation daily  trimethoprim  160 mG/sulfamethoxazole 800 mG 1 Tablet(s) Oral daily    MEDICATIONS  (PRN):  acetaminophen     Tablet .. 650 milliGRAM(s) Oral every 6 hours PRN Temp greater or equal to 38C (100.4F), Mild Pain (1 - 3)  aluminum hydroxide/magnesium hydroxide/simethicone Suspension 30 milliLiter(s) Oral every 4 hours PRN Dyspepsia  dextrose Oral Gel 15 Gram(s) Oral once PRN Blood Glucose LESS THAN 70 milliGRAM(s)/deciliter  melatonin 3 milliGRAM(s) Oral at bedtime PRN Insomnia  ondansetron Injectable 4 milliGRAM(s) IV Push every 8 hours PRN Nausea and/or Vomiting      HEALTH ISSUES - PROBLEM Dx:  Atrial fibrillation  paroxysmal, on eliquis    Diabetes mellitus    Prophylactic measure    Acute asthma exacerbation    SIRS without infection or organ dysfunction               Láazro Molina, PGY-1  Please contact on Teams    RAYRAY RODRIGUEZ  72y  Female    Reason for Admission:       SUBJECTIVE/INTERVAL EVENTS: No acute events. Pt seen and examined at bedside.    PHYSICAL EXAM:  GENERAL: NAD, lying comfortably in bed   HEAD:  Atraumatic, Normocephalic  EYES: EOMI b/l, PERRLA b/l, conjunctiva and sclera clear  NECK: Supple, No JVD, No LAD   CHEST/LUNG: coarse breath sounds, improved from prior  HEART: Regular rate and rhythm; S1 and S2 present, + Systolic ejection murmur  ABDOMEN: Soft, Nontender, Nondistended; Bowel sounds present  EXTREMITIES:  2+ Peripheral Pulses, No clubbing, cyanosis, or edema  NEURO: AAOx3, non-focal   SKIN: No rashes or lesions    Vital Signs Last 24 Hrs  T(C): 36.6 (13 Aug 2023 04:47), Max: 36.8 (12 Aug 2023 21:30)  T(F): 97.9 (13 Aug 2023 04:47), Max: 98.3 (12 Aug 2023 21:30)  HR: 71 (13 Aug 2023 04:47) (71 - 89)  BP: 121/77 (13 Aug 2023 04:47) (100/63 - 132/67)  BP(mean): --  RR: 18 (13 Aug 2023 04:47) (18 - 18)  SpO2: 95% (13 Aug 2023 04:47) (95% - 100%)    Parameters below as of 13 Aug 2023 04:47  Patient On (Oxygen Delivery Method): room air        Consultant(s) Notes Reviewed:  [x] YES  [ ] NO  Care Discussed with Consultants/Other Providers [x] YES  [ ] NO    LABS:                        9.7    9.05  )-----------( 179      ( 12 Aug 2023 07:17 )             34.3                         10.0   10.56 )-----------( 227      ( 11 Aug 2023 16:05 )             35.8                               137    |  101    |  16                  Calcium: 8.8   / iCa: x      (08-12 @ 07:21)    ----------------------------<  353       Magnesium: x                                4.9     |  21     |  0.67             Phosphorous: x        TPro  5.9    /  Alb  3.5    /  TBili  0.5    /  DBili  x      /  AST  33     /  ALT  16     /  AlkPhos  82     12 Aug 2023 07:21    Urinalysis Basic - ( 12 Aug 2023 07:21 )    Color: x / Appearance: x / SG: x / pH: x  Gluc: 353 mg/dL / Ketone: x  / Bili: x / Urobili: x   Blood: x / Protein: x / Nitrite: x   Leuk Esterase: x / RBC: x / WBC x   Sq Epi: x / Non Sq Epi: x / Bacteria: x        RADIOLOGY & ADDITIONAL TESTS:    Imaging Personally Reviewed:  [x] YES  [ ] NO    MEDICATIONS  (STANDING):  albuterol/ipratropium for Nebulization 3 milliLiter(s) Nebulizer every 6 hours  apixaban 5 milliGRAM(s) Oral every 12 hours  atorvastatin 20 milliGRAM(s) Oral at bedtime  buDESOnide    Inhalation Suspension 0.25 milliGRAM(s) Inhalation every 12 hours  chlorhexidine 4% Liquid 1 Application(s) Topical daily  dextrose 5%. 1000 milliLiter(s) (50 mL/Hr) IV Continuous <Continuous>  dextrose 5%. 1000 milliLiter(s) (100 mL/Hr) IV Continuous <Continuous>  dextrose 50% Injectable 25 Gram(s) IV Push once  dextrose 50% Injectable 12.5 Gram(s) IV Push once  dextrose 50% Injectable 25 Gram(s) IV Push once  ertapenem  IVPB 1000 milliGRAM(s) IV Intermittent every 24 hours  glucagon  Injectable 1 milliGRAM(s) IntraMuscular once  insulin glargine Injectable (LANTUS) 20 Unit(s) SubCutaneous at bedtime  insulin lispro (ADMELOG) corrective regimen sliding scale   SubCutaneous three times a day before meals  insulin lispro (ADMELOG) corrective regimen sliding scale   SubCutaneous at bedtime  insulin lispro Injectable (ADMELOG) 7 Unit(s) SubCutaneous three times a day before meals  methylPREDNISolone sodium succinate Injectable 40 milliGRAM(s) IV Push every 12 hours  mexiletine 200 milliGRAM(s) Oral every 8 hours  polyethylene glycol 3350 17 Gram(s) Oral two times a day  sodium chloride 3%  Inhalation 4 milliLiter(s) Inhalation every 12 hours  tiotropium 2.5 MICROgram(s) Inhaler 2 Puff(s) Inhalation daily  trimethoprim  160 mG/sulfamethoxazole 800 mG 1 Tablet(s) Oral daily    MEDICATIONS  (PRN):  acetaminophen     Tablet .. 650 milliGRAM(s) Oral every 6 hours PRN Temp greater or equal to 38C (100.4F), Mild Pain (1 - 3)  aluminum hydroxide/magnesium hydroxide/simethicone Suspension 30 milliLiter(s) Oral every 4 hours PRN Dyspepsia  dextrose Oral Gel 15 Gram(s) Oral once PRN Blood Glucose LESS THAN 70 milliGRAM(s)/deciliter  melatonin 3 milliGRAM(s) Oral at bedtime PRN Insomnia  ondansetron Injectable 4 milliGRAM(s) IV Push every 8 hours PRN Nausea and/or Vomiting      HEALTH ISSUES - PROBLEM Dx:  Atrial fibrillation  paroxysmal, on eliquis    Diabetes mellitus    Prophylactic measure    Acute asthma exacerbation    SIRS without infection or organ dysfunction

## 2023-08-13 NOTE — PHYSICAL THERAPY INITIAL EVALUATION ADULT - GAIT DEVIATIONS NOTED, PT EVAL
SOB, O2 sats fell to 90% , returned to 98% in <1 min/decreased bernie/decreased weight-shifting ability

## 2023-08-13 NOTE — PROGRESS NOTE ADULT - PROBLEM SELECTOR PLAN 5
PCP PPT: c/w bactrim   diet, dash consistent carb  Dvt: on eliquis  Dispo: pending clinical improvement PCP PPT: c/w bactrim   diet, dash consistent carb  Dvt: on Eliquis  Dispo: pending clinical improvement

## 2023-08-13 NOTE — PROGRESS NOTE ADULT - ASSESSMENT
72F hx asthma on chronic steroids, DM, pAfib on Eliquis, colorectal ca yrs ago s/p colostomy, recent admission for parainfluenza PNA on various antibiotics see HPI . P/w SOB, found to have bronchial secretions on CT, admit to medicine for exacerbation of bronchiectasis.

## 2023-08-13 NOTE — PROGRESS NOTE ADULT - PROBLEM SELECTOR PLAN 2
-meet SIRS  -blood cultures 8/11 NGTD  -f/u urine culture  - ID consulted, started ertapenem 8/12  -IVF -meet SIRS  -blood cultures 8/11 NGTD  -f/u urine culture  - ID consulted, started ertapenem 8/12 empirically given history of ESBL proteus infection   -IVF

## 2023-08-13 NOTE — PROGRESS NOTE ADULT - PROBLEM SELECTOR PLAN 4
-on 25-30 lantus at home, and 7-20 mealtime as well  -ISS moderate risk   -change to 20 lantus, and 7 meal time for now.  - Will likely need to uptitrate given increase in steroids and hx of uncontrolled diabetes. -on 25-30 lantus at home, and 7-20 mealtime as well  -ISS moderate risk   -change to 27 lantus, and 9 meal time for now.  - Will likely need to uptitrate given increase in steroids and hx of uncontrolled diabetes.

## 2023-08-13 NOTE — PROGRESS NOTE ADULT - PROBLEM SELECTOR PLAN 1
- CT chest showed increased bronchial secretions, on RA saturating well, speaking in full sentences, unclear trigger, likely due to infection, or inflammation.     - Sputum cultures , urine legionella, strep   - RVP negative  - hypertonic saline and albuterol neb q6h standing, followed by use of airway clerance device (patient brought her own from home)  - chest PT as tolerated   - budesonide neb q12h standing   - increase methylprednisolone to 40 mg IV bid for now, with taper to 40 mg IV daily in 1-2 days     - pulm following - CT chest showed increased bronchial secretions, on RA saturating well, speaking in full sentences, unclear trigger, likely due to infection, or inflammation.     - Sputum cultures , urine legionella, strep   - RVP negative  - hypertonic saline and albuterol neb q6h standing, followed by use of airway clerance device (patient brought her own from home)  - chest PT as tolerated   - budesonide neb q12h standing   - increase methylprednisolone to 40 mg IV bid for now, with taper to 40 mg IV daily in 1-2 days   - pulm following - CT chest showed increased bronchial secretions, on RA saturating well, speaking in full sentences, unclear trigger, likely due to infection, or inflammation.     - Sputum cultures, urine legionella, strep   - RVP negative  - hypertonic saline and albuterol neb q6h standing, followed by use of airway clearance device (patient brought her own from home)  - chest PT as tolerated   - budesonide neb q12h standing   - c/w methylprednisolone to 40 mg IV bid for now, with taper to 40 mg IV daily in 1-2 days   - pulm following

## 2023-08-14 LAB
ALBUMIN SERPL ELPH-MCNC: 3.5 G/DL — SIGNIFICANT CHANGE UP (ref 3.3–5)
ALP SERPL-CCNC: 70 U/L — SIGNIFICANT CHANGE UP (ref 40–120)
ALT FLD-CCNC: 17 U/L — SIGNIFICANT CHANGE UP (ref 10–45)
ANION GAP SERPL CALC-SCNC: 14 MMOL/L — SIGNIFICANT CHANGE UP (ref 5–17)
AST SERPL-CCNC: 37 U/L — SIGNIFICANT CHANGE UP (ref 10–40)
BASOPHILS # BLD AUTO: 0.01 K/UL — SIGNIFICANT CHANGE UP (ref 0–0.2)
BASOPHILS NFR BLD AUTO: 0.1 % — SIGNIFICANT CHANGE UP (ref 0–2)
BILIRUB SERPL-MCNC: 0.6 MG/DL — SIGNIFICANT CHANGE UP (ref 0.2–1.2)
BUN SERPL-MCNC: 23 MG/DL — SIGNIFICANT CHANGE UP (ref 7–23)
CALCIUM SERPL-MCNC: 8.8 MG/DL — SIGNIFICANT CHANGE UP (ref 8.4–10.5)
CHLORIDE SERPL-SCNC: 98 MMOL/L — SIGNIFICANT CHANGE UP (ref 96–108)
CO2 SERPL-SCNC: 25 MMOL/L — SIGNIFICANT CHANGE UP (ref 22–31)
CREAT SERPL-MCNC: 0.76 MG/DL — SIGNIFICANT CHANGE UP (ref 0.5–1.3)
EGFR: 82 ML/MIN/1.73M2 — SIGNIFICANT CHANGE UP
EOSINOPHIL # BLD AUTO: 0 K/UL — SIGNIFICANT CHANGE UP (ref 0–0.5)
EOSINOPHIL NFR BLD AUTO: 0 % — SIGNIFICANT CHANGE UP (ref 0–6)
GLUCOSE BLDC GLUCOMTR-MCNC: 182 MG/DL — HIGH (ref 70–99)
GLUCOSE BLDC GLUCOMTR-MCNC: 216 MG/DL — HIGH (ref 70–99)
GLUCOSE BLDC GLUCOMTR-MCNC: 375 MG/DL — HIGH (ref 70–99)
GLUCOSE SERPL-MCNC: 326 MG/DL — HIGH (ref 70–99)
HCT VFR BLD CALC: 28.6 % — LOW (ref 34.5–45)
HGB BLD-MCNC: 8.2 G/DL — LOW (ref 11.5–15.5)
IMM GRANULOCYTES NFR BLD AUTO: 1.5 % — HIGH (ref 0–0.9)
LYMPHOCYTES # BLD AUTO: 0.8 K/UL — LOW (ref 1–3.3)
LYMPHOCYTES # BLD AUTO: 4.1 % — LOW (ref 13–44)
MAGNESIUM SERPL-MCNC: 2.3 MG/DL — SIGNIFICANT CHANGE UP (ref 1.6–2.6)
MCHC RBC-ENTMCNC: 23 PG — LOW (ref 27–34)
MCHC RBC-ENTMCNC: 28.7 GM/DL — LOW (ref 32–36)
MCV RBC AUTO: 80.3 FL — SIGNIFICANT CHANGE UP (ref 80–100)
MONOCYTES # BLD AUTO: 1.14 K/UL — HIGH (ref 0–0.9)
MONOCYTES NFR BLD AUTO: 5.9 % — SIGNIFICANT CHANGE UP (ref 2–14)
NEUTROPHILS # BLD AUTO: 17.12 K/UL — HIGH (ref 1.8–7.4)
NEUTROPHILS NFR BLD AUTO: 88.4 % — HIGH (ref 43–77)
NRBC # BLD: 0 /100 WBCS — SIGNIFICANT CHANGE UP (ref 0–0)
PHOSPHATE SERPL-MCNC: 2.4 MG/DL — LOW (ref 2.5–4.5)
PLATELET # BLD AUTO: 240 K/UL — SIGNIFICANT CHANGE UP (ref 150–400)
POTASSIUM SERPL-MCNC: 5.1 MMOL/L — SIGNIFICANT CHANGE UP (ref 3.5–5.3)
POTASSIUM SERPL-SCNC: 5.1 MMOL/L — SIGNIFICANT CHANGE UP (ref 3.5–5.3)
PROT SERPL-MCNC: 5.5 G/DL — LOW (ref 6–8.3)
RBC # BLD: 3.56 M/UL — LOW (ref 3.8–5.2)
RBC # FLD: 25 % — HIGH (ref 10.3–14.5)
SODIUM SERPL-SCNC: 137 MMOL/L — SIGNIFICANT CHANGE UP (ref 135–145)
WBC # BLD: 19.37 K/UL — HIGH (ref 3.8–10.5)
WBC # FLD AUTO: 19.37 K/UL — HIGH (ref 3.8–10.5)

## 2023-08-14 PROCEDURE — 99233 SBSQ HOSP IP/OBS HIGH 50: CPT | Mod: GC

## 2023-08-14 PROCEDURE — 99233 SBSQ HOSP IP/OBS HIGH 50: CPT

## 2023-08-14 RX ORDER — SODIUM,POTASSIUM PHOSPHATES 278-250MG
1 POWDER IN PACKET (EA) ORAL ONCE
Refills: 0 | Status: COMPLETED | OUTPATIENT
Start: 2023-08-14 | End: 2023-08-14

## 2023-08-14 RX ORDER — SODIUM CHLORIDE 9 MG/ML
4 INJECTION INTRAMUSCULAR; INTRAVENOUS; SUBCUTANEOUS EVERY 12 HOURS
Refills: 0 | Status: DISCONTINUED | OUTPATIENT
Start: 2023-08-14 | End: 2023-08-15

## 2023-08-14 RX ADMIN — Medication 3 MILLILITER(S): at 05:30

## 2023-08-14 RX ADMIN — Medication 3 MILLILITER(S): at 12:11

## 2023-08-14 RX ADMIN — Medication 8: at 08:59

## 2023-08-14 RX ADMIN — Medication 0.25 MILLIGRAM(S): at 05:29

## 2023-08-14 RX ADMIN — ERTAPENEM SODIUM 120 MILLIGRAM(S): 1 INJECTION, POWDER, LYOPHILIZED, FOR SOLUTION INTRAMUSCULAR; INTRAVENOUS at 17:48

## 2023-08-14 RX ADMIN — SODIUM CHLORIDE 4 MILLILITER(S): 9 INJECTION INTRAMUSCULAR; INTRAVENOUS; SUBCUTANEOUS at 17:33

## 2023-08-14 RX ADMIN — TIOTROPIUM BROMIDE 2 PUFF(S): 18 CAPSULE ORAL; RESPIRATORY (INHALATION) at 12:11

## 2023-08-14 RX ADMIN — POLYETHYLENE GLYCOL 3350 17 GRAM(S): 17 POWDER, FOR SOLUTION ORAL at 17:31

## 2023-08-14 RX ADMIN — Medication 9 UNIT(S): at 17:29

## 2023-08-14 RX ADMIN — Medication 40 MILLIGRAM(S): at 05:29

## 2023-08-14 RX ADMIN — INSULIN GLARGINE 27 UNIT(S): 100 INJECTION, SOLUTION SUBCUTANEOUS at 21:40

## 2023-08-14 RX ADMIN — Medication 40 MILLIGRAM(S): at 17:31

## 2023-08-14 RX ADMIN — MEXILETINE HYDROCHLORIDE 200 MILLIGRAM(S): 150 CAPSULE ORAL at 05:29

## 2023-08-14 RX ADMIN — APIXABAN 5 MILLIGRAM(S): 2.5 TABLET, FILM COATED ORAL at 05:29

## 2023-08-14 RX ADMIN — Medication 30 MILLILITER(S): at 17:33

## 2023-08-14 RX ADMIN — MEXILETINE HYDROCHLORIDE 200 MILLIGRAM(S): 150 CAPSULE ORAL at 21:40

## 2023-08-14 RX ADMIN — Medication 3 MILLILITER(S): at 23:25

## 2023-08-14 RX ADMIN — SODIUM CHLORIDE 4 MILLILITER(S): 9 INJECTION INTRAMUSCULAR; INTRAVENOUS; SUBCUTANEOUS at 05:30

## 2023-08-14 RX ADMIN — Medication 9 UNIT(S): at 09:00

## 2023-08-14 RX ADMIN — Medication 1 TABLET(S): at 12:10

## 2023-08-14 RX ADMIN — MEXILETINE HYDROCHLORIDE 200 MILLIGRAM(S): 150 CAPSULE ORAL at 13:20

## 2023-08-14 RX ADMIN — ONDANSETRON 4 MILLIGRAM(S): 8 TABLET, FILM COATED ORAL at 17:33

## 2023-08-14 RX ADMIN — Medication 1 PACKET(S): at 12:11

## 2023-08-14 RX ADMIN — Medication 4: at 17:28

## 2023-08-14 RX ADMIN — Medication 0.25 MILLIGRAM(S): at 17:30

## 2023-08-14 RX ADMIN — Medication 10: at 13:19

## 2023-08-14 RX ADMIN — POLYETHYLENE GLYCOL 3350 17 GRAM(S): 17 POWDER, FOR SOLUTION ORAL at 05:28

## 2023-08-14 RX ADMIN — ATORVASTATIN CALCIUM 20 MILLIGRAM(S): 80 TABLET, FILM COATED ORAL at 21:40

## 2023-08-14 RX ADMIN — Medication 3 MILLILITER(S): at 00:00

## 2023-08-14 RX ADMIN — Medication 9 UNIT(S): at 13:19

## 2023-08-14 RX ADMIN — Medication 3 MILLILITER(S): at 17:30

## 2023-08-14 RX ADMIN — APIXABAN 5 MILLIGRAM(S): 2.5 TABLET, FILM COATED ORAL at 17:30

## 2023-08-14 NOTE — PROGRESS NOTE ADULT - PROBLEM SELECTOR PLAN 2
-meet SIRS  -blood cultures 8/11 NGTD  -f/u urine culture  - ID consulted, started ertapenem 8/12 empirically given history of ESBL proteus infection   -IVF as needed

## 2023-08-14 NOTE — PROGRESS NOTE ADULT - PROBLEM SELECTOR PLAN 1
- CT chest showed increased bronchial secretions, on RA saturating well, speaking in full sentences, unclear trigger, likely due to infection, or inflammation.     - Sputum cultures, urine legionella, strep   - RVP negative  - hypertonic saline and albuterol neb q6h standing, followed by use of airway clearance device (patient brought her own from home)  - chest PT as tolerated   - budesonide neb q12h standing   - c/w methylprednisolone to 40 mg IV bid for now, with taper to 40 mg IV daily in 1-2 days   - pulm following

## 2023-08-14 NOTE — CONSULT NOTE ADULT - SUBJECTIVE AND OBJECTIVE BOX
Patient is a 72y old  Female who presents with a chief complaint of sob (14 Aug 2023 09:56)      HPI:  71 yo PMHx  asthma on chronic steroids, bronchiectasis s/p surgery, allergic rhinitis,  recurrent PNA, PND, tracheomalacia s/p tracheoplasty, ESBL proteus infections (sputum),  diabetes, paroxysmal A-fib on Eliquis, colorectal cancer many years ago status post colostomy presenting with concern for shortness of breath. It started 2 and half weeks ago with dysnea on exertion, increased sputum production and running nose. She also endorsed sweatiness, chill, abd pain, denied fever, n/v/c/d, sick contact. she is up to date with vaccines. she has had chronic shortness of breath and cough for years. SHe had a recent hospitalization at an outside hospital  for acute respiratory failure with hypoxia secondary to asthma/COPD exacerbation and bronchopneumonia 6/5/2023 - 6/16/2023), She was found to have ESBL/Proteus/yeast in sputum culture and was treated with ertapenem/Benadryl/vancomycin/aztreonam and methylprednisolone.   She was discharged home with a midline and completed a course of antibiotics ertapenem (last dose 06/25).Dr. Allison also prescribed tobramycin after discharge,  and her metylprednisolone dose was reduced from 32 to 28 mg po daily from last monday to this monday. She states since dose reduction she has had worsened dyspnea. when she called Dr. Allison's office, he urged the patient to come to the ED for further management.     Of note had chronic dyspnea/severe asthma since childhood. She states she had a severe sinus infection requiring surgery at ?10 yo and since then has had difficulties with breathing. She has been on chronic steroids for over 20 years per report.. She uses Breo, Spiriva, albuterol neb and a cough assist device.     In the ED, BP stable 121/65, RR 22, saturating at RA at 100%, 97.5 temp. CBC has 10.56 HGB 10, platelet 227. chemistry normal except glucose 272. procal 0.13, trop 28. Last A1C 9.1 VBG 7.34, pCO2 is 52. CT showed new mucous secretions in the bronchus intermedius.Unchanged infectious/inflammatory small airways disease in the right middle/lower lobes with multifocal small tree-in-bud nodules.Status post ascending aortic and aortic valve replacement, with residual dissection flap better seen on the prior study.Stable indeterminate small left renal nodule and numerous pancreatic cysts. In the ED, she was on Duonebs, LR 500cc, ertapenem adn benadryl was given as well.  (11 Aug 2023 22:09)      PAST MEDICAL & SURGICAL HISTORY:  Atrial fibrillation  paroxysmal, on eliquis      Diabetes  Type 2      COPD (chronic obstructive pulmonary disease)      Adrenal insufficiency  Medrol daily for over 50 years      Aortic insufficiency  moderate AR on echo 5/3/2018      Pelvic fracture      Asthma      Tracheobronchomalacia  diagnosed 2015, s/p bronchial thermoplasty 2016 (Dr Zapien); recent bronchoscopy 6/5/2018 revealed no evidence of tracheobronchomalacia in trachea or bronchial tubes      Colorectal cancer  4/2018- last treatment , chemo and radiation      Rectal bleeding      Seizure  x 1 1/7/18      DVT (deep venous thrombosis)  15-20 years ago, took coumadin      TIA (transient ischemic attack)  multiple, last 5 years ago - presents as right-sided weakness      History of partial hysterectomy  30 years ago - fibroids      H/O total knee replacement, bilateral  5 years ago      History of sinus surgery  multiple sinus surgeries      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      H/O pelvic surgery  5 years ago - s/p fracture      History of tracheomalacia  2015 - attempted tracheal stenting ([facility])- course complicated by obstruction, respiratory failure, multiple CPR attempts -  stent discontinued; 10/20/2016 Tracheobronchoplasty (Prolene Mesh) performed at [facility] by Dr Zapien      S/P bronchoscopy  6/5/2018 - [facility] (Dr Zapien) no evidence of tracheobronchomalacia in trachea or bronchial tubes      Rectal bleeding  exam under anesthesia ([facility]) 2/2018          MEDICATIONS  (STANDING):  albuterol/ipratropium for Nebulization 3 milliLiter(s) Nebulizer every 6 hours  apixaban 5 milliGRAM(s) Oral every 12 hours  atorvastatin 20 milliGRAM(s) Oral at bedtime  buDESOnide    Inhalation Suspension 0.25 milliGRAM(s) Inhalation every 12 hours  chlorhexidine 4% Liquid 1 Application(s) Topical daily  dextrose 5%. 1000 milliLiter(s) (50 mL/Hr) IV Continuous <Continuous>  dextrose 5%. 1000 milliLiter(s) (100 mL/Hr) IV Continuous <Continuous>  dextrose 50% Injectable 12.5 Gram(s) IV Push once  dextrose 50% Injectable 25 Gram(s) IV Push once  dextrose 50% Injectable 25 Gram(s) IV Push once  ertapenem  IVPB 1000 milliGRAM(s) IV Intermittent every 24 hours  glucagon  Injectable 1 milliGRAM(s) IntraMuscular once  insulin glargine Injectable (LANTUS) 27 Unit(s) SubCutaneous at bedtime  insulin lispro (ADMELOG) corrective regimen sliding scale   SubCutaneous three times a day before meals  insulin lispro (ADMELOG) corrective regimen sliding scale   SubCutaneous at bedtime  insulin lispro Injectable (ADMELOG) 9 Unit(s) SubCutaneous three times a day with meals  methylPREDNISolone sodium succinate Injectable 40 milliGRAM(s) IV Push two times a day  mexiletine 200 milliGRAM(s) Oral every 8 hours  polyethylene glycol 3350 17 Gram(s) Oral two times a day  sodium chloride 7% Inhalation 4 milliLiter(s) Inhalation every 12 hours  tiotropium 2.5 MICROgram(s) Inhaler 2 Puff(s) Inhalation daily  trimethoprim  160 mG/sulfamethoxazole 800 mG 1 Tablet(s) Oral daily    MEDICATIONS  (PRN):  acetaminophen     Tablet .. 650 milliGRAM(s) Oral every 6 hours PRN Temp greater or equal to 38C (100.4F), Mild Pain (1 - 3)  aluminum hydroxide/magnesium hydroxide/simethicone Suspension 30 milliLiter(s) Oral every 4 hours PRN Dyspepsia  dextrose Oral Gel 15 Gram(s) Oral once PRN Blood Glucose LESS THAN 70 milliGRAM(s)/deciliter  melatonin 3 milliGRAM(s) Oral at bedtime PRN Insomnia  ondansetron Injectable 4 milliGRAM(s) IV Push every 8 hours PRN Nausea and/or Vomiting      Allergies    tetanus toxoid (Short breath)  cefepime (Anaphylaxis)  penicillin (Anaphylaxis)  Avelox (Short breath; Pruritus)  Dilaudid (Short breath)  codeine (Short breath)  aspirin (Short breath)  iodine (Short breath; Swelling)  Valium (Short breath)  shellfish (Anaphylaxis)    Intolerances        VITALS:    Vital Signs Last 24 Hrs  T(C): 36.5 (14 Aug 2023 12:59), Max: 37.1 (13 Aug 2023 20:37)  T(F): 97.7 (14 Aug 2023 12:59), Max: 98.7 (13 Aug 2023 20:37)  HR: 80 (14 Aug 2023 12:59) (76 - 81)  BP: 132/64 (14 Aug 2023 12:59) (101/56 - 132/64)  BP(mean): --  RR: 18 (14 Aug 2023 12:59) (18 - 18)  SpO2: 100% (14 Aug 2023 12:59) (100% - 100%)    Parameters below as of 14 Aug 2023 12:59  Patient On (Oxygen Delivery Method): room air        LABS:                          8.2    19.37 )-----------( 240      ( 14 Aug 2023 07:25 )             28.6       08-14    137  |  98  |  23  ----------------------------<  326<H>  5.1   |  25  |  0.76    Ca    8.8      14 Aug 2023 07:21  Phos  2.4     08-14  Mg     2.3     08-14    TPro  5.5<L>  /  Alb  3.5  /  TBili  0.6  /  DBili  x   /  AST  37  /  ALT  17  /  AlkPhos  70  08-14      CAPILLARY BLOOD GLUCOSE      POCT Blood Glucose.: 375 mg/dL (14 Aug 2023 12:34)  POCT Blood Glucose.: 344 mg/dL (14 Aug 2023 08:56)  POCT Blood Glucose.: 250 mg/dL (13 Aug 2023 21:37)  POCT Blood Glucose.: 181 mg/dL (13 Aug 2023 17:07)          LOWER EXTREMITY PHYSICAL EXAM:    Vascular: DP/PT 1/4,right foot, left foot non-palpable, CFT <3 seconds B/L, Temperature gradient wnl, B/L.   Neuro: Epicritic sensation present to the level of toes, B/L.  Musculoskeletal/Ortho: ulcer posterior left heel with no signs of infection no signs of cellulitis left foot

## 2023-08-14 NOTE — CONSULT NOTE ADULT - ASSESSMENT
patient seen at bedside in good spirits      LOWER EXTREMITY PHYSICAL EXAM:    Vascular: DP/PT 1/4,right foot, left foot non-palpable, CFT <3 seconds B/L, Temperature gradient wnl, B/L.   Neuro: Epicritic sensation present to the level of toes, B/L.  Musculoskeletal/Ortho: ulcer posterior left heel with no signs of infection no signs of cellulitis left foot  continue with allevyn pads left foot  patient refuses z float boots but will use pillows under her calves to avoid heel pressure  reconsult podiatry as needed

## 2023-08-14 NOTE — PROGRESS NOTE ADULT - ASSESSMENT
71 yo PMHx  asthma on chronic steroids, bronchiectasis s/p surgery, allergic rhinitis,  recurrent PNA, PND, tracheomalacia s/p tracheoplasty, ESBL proteus infections (sputum),  diabetes, paroxysmal A-fib on Eliquis, colorectal cancer many years ago status post colostomy presenting with concern for shortness of breath. Prior issues with ESBL/Proteus/yeast in sputum culture and was treated with ertapenem/Benadryl/vancomycin/aztreonam and methylprednisolone.   She was discharged home with a midline and completed a course of antibiotics ertapenem (last dose 06/25) PT now readmitted with severe dyspnea and reports that having had asthma for 61 years she feels like she needs an antibiotic. Reports not tolerating meropenem.    RECOMMENDATIONS  -no evidence of airspace disease so more of a bronchiectasis exacerbation  -recent sputums with ESBL proteus so having tolerated ertapenem will restart  -ertapenem started 8/12 -- continue for now   -leukocytosis noted - patient on IV steroids, suspect may be reactive   - no new complaints or focal findings on exam; remains afebrile   Patient follows with Dr Allison who knows this complicated pt very well  Monitor temps/CBC    Tello Fernandez M.D.  [facility], Division of Infectious Diseases  [phone]  After 5pm on weekdays and all day on weekends - please call [phone]

## 2023-08-14 NOTE — PROGRESS NOTE ADULT - PROBLEM SELECTOR PLAN 4
-on 25-30 lantus at home, and 7-20 mealtime as well  -ISS moderate risk   -change to 27 lantus, and 9 meal time for now.  - may need to uptitrate given increase in steroids and hx of uncontrolled diabetes.

## 2023-08-14 NOTE — PROGRESS NOTE ADULT - SUBJECTIVE AND OBJECTIVE BOX
Lázaro Molina, PGY-1  Please contact on Teams    RAYRAY RODRIGUEZ  72y  Female    Reason for Admission:       SUBJECTIVE/INTERVAL EVENTS: No acute events. Pt seen and examined at bedside.    PHYSICAL EXAM:  GENERAL: NAD, lying comfortably in bed   HEAD:  Atraumatic, Normocephalic  EYES: EOMI b/l, PERRLA b/l, conjunctiva and sclera clear  NECK: Supple, No JVD, No LAD   CHEST/LUNG: coarse breath sounds, improved from prior  HEART: Regular rate and rhythm; S1 and S2 present, + Systolic ejection murmur  ABDOMEN: Soft, Nontender, Nondistended; Bowel sounds present  EXTREMITIES:  2+ Peripheral Pulses, No clubbing, cyanosis, or edema  NEURO: AAOx3, non-focal   SKIN: No rashes or lesions    Vital Signs Last 24 Hrs  T(C): 36.6 (14 Aug 2023 04:15), Max: 37.1 (13 Aug 2023 20:37)  T(F): 97.8 (14 Aug 2023 04:15), Max: 98.7 (13 Aug 2023 20:37)  HR: 81 (14 Aug 2023 04:15) (76 - 101)  BP: 101/56 (14 Aug 2023 04:15) (101/56 - 124/71)  BP(mean): --  RR: 18 (14 Aug 2023 04:15) (18 - 18)  SpO2: 100% (14 Aug 2023 04:15) (90% - 100%)    Parameters below as of 14 Aug 2023 04:15  Patient On (Oxygen Delivery Method): room air        Consultant(s) Notes Reviewed:  [x] YES  [ ] NO  Care Discussed with Consultants/Other Providers [x] YES  [ ] NO    LABS:                        8.6    16.73 )-----------( 180      ( 13 Aug 2023 07:21 )             31.7                         9.7    9.05  )-----------( 179      ( 12 Aug 2023 07:17 )             34.3                         10.0   10.56 )-----------( 227      ( 11 Aug 2023 16:05 )             35.8         Urinalysis Basic - ( 13 Aug 2023 07:22 )    Color: x / Appearance: x / SG: x / pH: x  Gluc: 365 mg/dL / Ketone: x  / Bili: x / Urobili: x   Blood: x / Protein: x / Nitrite: x   Leuk Esterase: x / RBC: x / WBC x   Sq Epi: x / Non Sq Epi: x / Bacteria: x        RADIOLOGY & ADDITIONAL TESTS:    Imaging Personally Reviewed:  [x] YES  [ ] NO    MEDICATIONS  (STANDING):  albuterol/ipratropium for Nebulization 3 milliLiter(s) Nebulizer every 6 hours  apixaban 5 milliGRAM(s) Oral every 12 hours  atorvastatin 20 milliGRAM(s) Oral at bedtime  buDESOnide    Inhalation Suspension 0.25 milliGRAM(s) Inhalation every 12 hours  chlorhexidine 4% Liquid 1 Application(s) Topical daily  dextrose 5%. 1000 milliLiter(s) (50 mL/Hr) IV Continuous <Continuous>  dextrose 5%. 1000 milliLiter(s) (100 mL/Hr) IV Continuous <Continuous>  dextrose 50% Injectable 25 Gram(s) IV Push once  dextrose 50% Injectable 12.5 Gram(s) IV Push once  dextrose 50% Injectable 25 Gram(s) IV Push once  ertapenem  IVPB 1000 milliGRAM(s) IV Intermittent every 24 hours  glucagon  Injectable 1 milliGRAM(s) IntraMuscular once  insulin glargine Injectable (LANTUS) 27 Unit(s) SubCutaneous at bedtime  insulin lispro (ADMELOG) corrective regimen sliding scale   SubCutaneous three times a day before meals  insulin lispro (ADMELOG) corrective regimen sliding scale   SubCutaneous at bedtime  insulin lispro Injectable (ADMELOG) 9 Unit(s) SubCutaneous three times a day with meals  methylPREDNISolone sodium succinate Injectable 40 milliGRAM(s) IV Push every 12 hours  mexiletine 200 milliGRAM(s) Oral every 8 hours  polyethylene glycol 3350 17 Gram(s) Oral two times a day  sodium chloride 3%  Inhalation 4 milliLiter(s) Inhalation every 12 hours  tiotropium 2.5 MICROgram(s) Inhaler 2 Puff(s) Inhalation daily  trimethoprim  160 mG/sulfamethoxazole 800 mG 1 Tablet(s) Oral daily    MEDICATIONS  (PRN):  acetaminophen     Tablet .. 650 milliGRAM(s) Oral every 6 hours PRN Temp greater or equal to 38C (100.4F), Mild Pain (1 - 3)  aluminum hydroxide/magnesium hydroxide/simethicone Suspension 30 milliLiter(s) Oral every 4 hours PRN Dyspepsia  dextrose Oral Gel 15 Gram(s) Oral once PRN Blood Glucose LESS THAN 70 milliGRAM(s)/deciliter  melatonin 3 milliGRAM(s) Oral at bedtime PRN Insomnia  ondansetron Injectable 4 milliGRAM(s) IV Push every 8 hours PRN Nausea and/or Vomiting      HEALTH ISSUES - PROBLEM Dx:  Acute exacerbation of bronchiectasis    Acute asthma exacerbation    SIRS without infection or organ dysfunction    Atrial fibrillation  paroxysmal, on eliquis    Diabetes mellitus    Prophylactic measure

## 2023-08-14 NOTE — PROGRESS NOTE ADULT - SUBJECTIVE AND OBJECTIVE BOX
[facility] DIVISION OF INFECTIOUS DISEASES  GISSELL Uriostegui Y. Patel, S. Shah, G. Terrell  [phone]  ([phone] - weekdays after 5pm and weekends)    Name: RAYRAY RODRIGUEZ  Age/Gender: 72y Female  MRN: [id number]    Interval History:  Patient seen and examined this morning.   States she has dyspnea, minimal dry cough.  Did not feel duoneb helped earlier this am  Denies fever or chills, no pain or other complaints.  Notes reviewed. No concerning overnight events.  Afebrile.     Allergies: tetanus toxoid (Short breath)  cefepime (Anaphylaxis)  penicillin (Anaphylaxis)  Avelox (Short breath; Pruritus)  Dilaudid (Short breath)  codeine (Short breath)  aspirin (Short breath)  iodine (Short breath; Swelling)  Valium (Short breath)  shellfish (Anaphylaxis)      Objective:  Vitals:   T(F): 97.8 (08-14-23 @ 04:15), Max: 98.7 (08-13-23 @ 20:37)  HR: 81 (08-14-23 @ 04:15) (76 - 81)  BP: 101/56 (08-14-23 @ 04:15) (101/56 - 120/65)  RR: 18 (08-14-23 @ 04:15) (18 - 18)  SpO2: 100% (08-14-23 @ 04:15) (100% - 100%)  Physical Examination:  General: no acute distress, RA  HEENT: NC/AT, EOMI, anicteric, neck supple  Cardio: S1, S2 present, normal rate  Resp: clear to auscultation bilaterally  Abd: soft, NT, ND, +ostomy  Neuro: AAOx3, no obvious focal deficits  Ext: no edema, no cyanosis, moving extremities  Skin: warm, dry, no visible rash  Psych: appropriate affect and mood for situation  Lines: PIV    Laboratory Studies:  CBC:                       8.2    19.37 )-----------( 240      ( 14 Aug 2023 07:25 )             28.6     WBC Trend:  19.37 08-14-23 @ 07:25  16.73 08-13-23 @ 07:21  9.05 08-12-23 @ 07:17  10.56 08-11-23 @ 16:05    CMP: 08-14    137  |  98  |  23  ----------------------------<  326<H>  5.1   |  25  |  0.76    Ca    8.8      14 Aug 2023 07:21  Phos  2.4     08-14  Mg     2.3     08-14    TPro  5.5<L>  /  Alb  3.5  /  TBili  0.6  /  DBili  x   /  AST  37  /  ALT  17  /  AlkPhos  70  08-14    Creatinine: 0.76 mg/dL (08-14-23 @ 07:21)  Creatinine: 0.68 mg/dL (08-13-23 @ 07:22)  Creatinine: 0.67 mg/dL (08-12-23 @ 07:21)  Creatinine: 0.80 mg/dL (08-11-23 @ 16:05)    LIVER FUNCTIONS - ( 14 Aug 2023 07:21 )  Alb: 3.5 g/dL / Pro: 5.5 g/dL / ALK PHOS: 70 U/L / ALT: 17 U/L / AST: 37 U/L / GGT: x           Microbiology: reviewed   Culture - Blood (collected 08-11-23 @ 16:00)  Source: .Blood Blood-Peripheral  Preliminary Report (08-13-23 @ 20:01):    No growth at 48 Hours    Culture - Blood (collected 08-11-23 @ 15:15)  Source: .Blood Blood-Peripheral  Preliminary Report (08-13-23 @ 20:01):    No growth at 48 Hours    SARS-CoV-2 Result: NotDete (11 Aug 2023 16:06)    Radiology: reviewed     Medications:  acetaminophen     Tablet .. 650 milliGRAM(s) Oral every 6 hours PRN  albuterol/ipratropium for Nebulization 3 milliLiter(s) Nebulizer every 6 hours  aluminum hydroxide/magnesium hydroxide/simethicone Suspension 30 milliLiter(s) Oral every 4 hours PRN  apixaban 5 milliGRAM(s) Oral every 12 hours  atorvastatin 20 milliGRAM(s) Oral at bedtime  buDESOnide    Inhalation Suspension 0.25 milliGRAM(s) Inhalation every 12 hours  chlorhexidine 4% Liquid 1 Application(s) Topical daily  dextrose 5%. 1000 milliLiter(s) IV Continuous <Continuous>  dextrose 5%. 1000 milliLiter(s) IV Continuous <Continuous>  dextrose 50% Injectable 25 Gram(s) IV Push once  dextrose 50% Injectable 12.5 Gram(s) IV Push once  dextrose 50% Injectable 25 Gram(s) IV Push once  dextrose Oral Gel 15 Gram(s) Oral once PRN  ertapenem  IVPB 1000 milliGRAM(s) IV Intermittent every 24 hours  glucagon  Injectable 1 milliGRAM(s) IntraMuscular once  insulin glargine Injectable (LANTUS) 27 Unit(s) SubCutaneous at bedtime  insulin lispro (ADMELOG) corrective regimen sliding scale   SubCutaneous three times a day before meals  insulin lispro (ADMELOG) corrective regimen sliding scale   SubCutaneous at bedtime  insulin lispro Injectable (ADMELOG) 9 Unit(s) SubCutaneous three times a day with meals  melatonin 3 milliGRAM(s) Oral at bedtime PRN  methylPREDNISolone sodium succinate Injectable 40 milliGRAM(s) IV Push every 12 hours  mexiletine 200 milliGRAM(s) Oral every 8 hours  ondansetron Injectable 4 milliGRAM(s) IV Push every 8 hours PRN  polyethylene glycol 3350 17 Gram(s) Oral two times a day  sodium chloride 3%  Inhalation 4 milliLiter(s) Inhalation every 12 hours  tiotropium 2.5 MICROgram(s) Inhaler 2 Puff(s) Inhalation daily  trimethoprim  160 mG/sulfamethoxazole 800 mG 1 Tablet(s) Oral daily    Current Antimicrobials:  ertapenem  IVPB 1000 milliGRAM(s) IV Intermittent every 24 hours  trimethoprim  160 mG/sulfamethoxazole 800 mG 1 Tablet(s) Oral daily    Prior/Completed Antimicrobials:  ertapenem  IVPB

## 2023-08-14 NOTE — PROGRESS NOTE ADULT - SUBJECTIVE AND OBJECTIVE BOX
Interval History:  Patient seen and examined this morning. Continued paroxysms of cough with difficulty clearing sputum.  O2Sat on ambulation to bathroom was 90% on RA.  Still with dyspnea.  Unable to produce sputum.    Allergies: tetanus toxoid (Short breath)  cefepime (Anaphylaxis)  penicillin (Anaphylaxis)  Avelox (Short breath; Pruritus)  Dilaudid (Short breath)  codeine (Short breath)  aspirin (Short breath)  iodine (Short breath; Swelling)  Valium (Short breath)  shellfish (Anaphylaxis)      Objective:  Vitals:   T(F): 97.8 (08-14-23 @ 04:15), Max: 98.7 (08-13-23 @ 20:37)  HR: 81 (08-14-23 @ 04:15) (76 - 81)  BP: 101/56 (08-14-23 @ 04:15) (101/56 - 120/65)  RR: 18 (08-14-23 @ 04:15) (18 - 18)  SpO2: 100% (08-14-23 @ 04:15) (100% - 100%)  Physical Examination:  General: no acute distress, RA  HEENT: NC/AT, EOMI, anicteric, neck supple  Cardio: S1, S2 present, normal rate  Resp: clear to auscultation bilaterally  Abd: soft, NT, ND, +ostomy  Neuro: AAOx3, no obvious focal deficits  Ext: no edema, no cyanosis, moving extremities  Skin: warm, dry, no visible rash  Psych: appropriate affect and mood for situation  Lines: PIV    Laboratory Studies:  CBC:                       8.2    19.37 )-----------( 240      ( 14 Aug 2023 07:25 )             28.6     WBC Trend:  19.37 08-14-23 @ 07:25  16.73 08-13-23 @ 07:21  9.05 08-12-23 @ 07:17  10.56 08-11-23 @ 16:05    CMP: 08-14    137  |  98  |  23  ----------------------------<  326<H>  5.1   |  25  |  0.76    Ca    8.8      14 Aug 2023 07:21  Phos  2.4     08-14  Mg     2.3     08-14    TPro  5.5<L>  /  Alb  3.5  /  TBili  0.6  /  DBili  x   /  AST  37  /  ALT  17  /  AlkPhos  70  08-14    Creatinine: 0.76 mg/dL (08-14-23 @ 07:21)  Creatinine: 0.68 mg/dL (08-13-23 @ 07:22)  Creatinine: 0.67 mg/dL (08-12-23 @ 07:21)  Creatinine: 0.80 mg/dL (08-11-23 @ 16:05)    LIVER FUNCTIONS - ( 14 Aug 2023 07:21 )  Alb: 3.5 g/dL / Pro: 5.5 g/dL / ALK PHOS: 70 U/L / ALT: 17 U/L / AST: 37 U/L / GGT: x           Microbiology: reviewed   Culture - Blood (collected 08-11-23 @ 16:00)  Source: .Blood Blood-Peripheral  Preliminary Report (08-13-23 @ 20:01):    No growth at 48 Hours    Culture - Blood (collected 08-11-23 @ 15:15)  Source: .Blood Blood-Peripheral  Preliminary Report (08-13-23 @ 20:01):    No growth at 48 Hours    SARS-CoV-2 Result: NotDete (11 Aug 2023 16:06)    Radiology: reviewed     Medications:  acetaminophen     Tablet .. 650 milliGRAM(s) Oral every 6 hours PRN  albuterol/ipratropium for Nebulization 3 milliLiter(s) Nebulizer every 6 hours  aluminum hydroxide/magnesium hydroxide/simethicone Suspension 30 milliLiter(s) Oral every 4 hours PRN  apixaban 5 milliGRAM(s) Oral every 12 hours  atorvastatin 20 milliGRAM(s) Oral at bedtime  buDESOnide    Inhalation Suspension 0.25 milliGRAM(s) Inhalation every 12 hours  chlorhexidine 4% Liquid 1 Application(s) Topical daily  dextrose 5%. 1000 milliLiter(s) IV Continuous <Continuous>  dextrose 5%. 1000 milliLiter(s) IV Continuous <Continuous>  dextrose 50% Injectable 25 Gram(s) IV Push once  dextrose 50% Injectable 12.5 Gram(s) IV Push once  dextrose 50% Injectable 25 Gram(s) IV Push once  dextrose Oral Gel 15 Gram(s) Oral once PRN  ertapenem  IVPB 1000 milliGRAM(s) IV Intermittent every 24 hours  glucagon  Injectable 1 milliGRAM(s) IntraMuscular once  insulin glargine Injectable (LANTUS) 27 Unit(s) SubCutaneous at bedtime  insulin lispro (ADMELOG) corrective regimen sliding scale   SubCutaneous three times a day before meals  insulin lispro (ADMELOG) corrective regimen sliding scale   SubCutaneous at bedtime  insulin lispro Injectable (ADMELOG) 9 Unit(s) SubCutaneous three times a day with meals  melatonin 3 milliGRAM(s) Oral at bedtime PRN  methylPREDNISolone sodium succinate Injectable 40 milliGRAM(s) IV Push every 12 hours  mexiletine 200 milliGRAM(s) Oral every 8 hours  ondansetron Injectable 4 milliGRAM(s) IV Push every 8 hours PRN  polyethylene glycol 3350 17 Gram(s) Oral two times a day  sodium chloride 3%  Inhalation 4 milliLiter(s) Inhalation every 12 hours  tiotropium 2.5 MICROgram(s) Inhaler 2 Puff(s) Inhalation daily  trimethoprim  160 mG/sulfamethoxazole 800 mG 1 Tablet(s) Oral daily    Current Antimicrobials:  ertapenem  IVPB 1000 milliGRAM(s) IV Intermittent every 24 hours  trimethoprim  160 mG/sulfamethoxazole 800 mG 1 Tablet(s) Oral daily

## 2023-08-14 NOTE — PROGRESS NOTE ADULT - ASSESSMENT
73 yo PMHx  asthma on chronic steroids, bronchiectasis, allergic rhinitis,  recurrent PNA, PND, tracheomalacia s/p tracheoplasty, tracheobronchomalacia, ESBL proteus infections (sputum),  diabetes, paroxysmal A-fib on Eliquis, colorectal cancer many years ago status post colostomy, aortic dissection s/p ascending aorta repair presenting with acute on chronic shortness of breath in the setting of recent completion of abx and tapering of her steroids as an outpatient. She reports increased cough/sputum production, worsened dyspnea/wheezing.  RVP (-). Procal 0.13. CTA neg for PE but showing new mucous secretions in BI, mild RLL BE, RML/RLL TIB opacities and some mild central bilateral GGOs.  Noted more focal left-sided wheezing on exam today. Suspect asthma exacerbation/acute bronchitis?    - remains afebrile but with persistent leukocytosis  - send sputum cultures for bacterial, fungal and AFB smear/cx (to eval for NTM)  - change hypertonic saline to 7% (currently just on 3% q12h) WITH albuterol neb q6h standing, followed by use of airway clearance device (patient brought her own from home)  - chest PT as tolerated   - cont Budesonide neb but increase from 0.25mg to 0.5mg q12h   - increase methylprednisolone to 40 mg IV bid TODAY  - repeat CXR  - ID following, recs appreciated, currently on Ertapenem (8/12 -)  - PT/OT, OOB to chair as much as possible    Radha Desai MD

## 2023-08-14 NOTE — PROGRESS NOTE ADULT - ATTENDING COMMENTS
This is a 72F with h/o bronchiectasis on chronic steroid, s/p surgery, allergic rhinitis,  recurrent PNA, PND, tracheomalacia s/p tracheoplasty, ESBL proteus infections (sputum), T2DM, paroxysmal A-fib on Eliquis, colorectal cancer many years ago status post colostomy presenting with shortness of breath for 3 days. Prior issues with ESBL/Proteus/yeast in sputum culture and was treated with ertapenem/Benadryl/vancomycin/aztreonam and methylprednisolone. She was discharged home with a midline and completed a course of antibiotics ertapenem (last dose 06/25). Recently she was treated for MRSA and pseudomonas in sputum and completed Tobra nebs and doxycycline.    1. Acute on chronic hypoxic RF due to exacerbation of bronchiectasis (on chronic steroid)  2. Recent tracheo-bronchitis due to ESBL proteus/MRSA/pseudomonas  3. Paroxysmal A-fib on Eliquis  4. H/o colorectal cancer, status post colostomy    - Gets SOB during deep inspiration and talk, no chest pain, remains afebrile. WBC 19, O2 sat 99% RA in rest  - ID and Pulmonary plans noted- on bronchodilators, IV/inhaled steroids, IV Ertapenem  - Elevated WBC is likely from IV steroid  - PT, O2 sat on ambulation  - Echo  - c/w Eliquis, Lantus/Admelog at current dose, FS elevated due to IV steroid

## 2023-08-15 LAB
ALBUMIN SERPL ELPH-MCNC: 3.4 G/DL — SIGNIFICANT CHANGE UP (ref 3.3–5)
ALP SERPL-CCNC: 66 U/L — SIGNIFICANT CHANGE UP (ref 40–120)
ALT FLD-CCNC: 17 U/L — SIGNIFICANT CHANGE UP (ref 10–45)
ANION GAP SERPL CALC-SCNC: 13 MMOL/L — SIGNIFICANT CHANGE UP (ref 5–17)
AST SERPL-CCNC: 39 U/L — SIGNIFICANT CHANGE UP (ref 10–40)
BASOPHILS # BLD AUTO: 0.03 K/UL — SIGNIFICANT CHANGE UP (ref 0–0.2)
BASOPHILS NFR BLD AUTO: 0.1 % — SIGNIFICANT CHANGE UP (ref 0–2)
BILIRUB SERPL-MCNC: 0.6 MG/DL — SIGNIFICANT CHANGE UP (ref 0.2–1.2)
BUN SERPL-MCNC: 18 MG/DL — SIGNIFICANT CHANGE UP (ref 7–23)
CALCIUM SERPL-MCNC: 8.8 MG/DL — SIGNIFICANT CHANGE UP (ref 8.4–10.5)
CHLORIDE SERPL-SCNC: 99 MMOL/L — SIGNIFICANT CHANGE UP (ref 96–108)
CO2 SERPL-SCNC: 27 MMOL/L — SIGNIFICANT CHANGE UP (ref 22–31)
CREAT SERPL-MCNC: 0.71 MG/DL — SIGNIFICANT CHANGE UP (ref 0.5–1.3)
EGFR: 89 ML/MIN/1.73M2 — SIGNIFICANT CHANGE UP
EOSINOPHIL # BLD AUTO: 0 K/UL — SIGNIFICANT CHANGE UP (ref 0–0.5)
EOSINOPHIL NFR BLD AUTO: 0 % — SIGNIFICANT CHANGE UP (ref 0–6)
GLUCOSE BLDC GLUCOMTR-MCNC: 117 MG/DL — HIGH (ref 70–99)
GLUCOSE BLDC GLUCOMTR-MCNC: 185 MG/DL — HIGH (ref 70–99)
GLUCOSE BLDC GLUCOMTR-MCNC: 217 MG/DL — HIGH (ref 70–99)
GLUCOSE BLDC GLUCOMTR-MCNC: 95 MG/DL — SIGNIFICANT CHANGE UP (ref 70–99)
GLUCOSE SERPL-MCNC: 249 MG/DL — HIGH (ref 70–99)
GRAM STN FLD: SIGNIFICANT CHANGE UP
HCT VFR BLD CALC: 29.8 % — LOW (ref 34.5–45)
HGB BLD-MCNC: 8.5 G/DL — LOW (ref 11.5–15.5)
IMM GRANULOCYTES NFR BLD AUTO: 2.8 % — HIGH (ref 0–0.9)
LYMPHOCYTES # BLD AUTO: 1.42 K/UL — SIGNIFICANT CHANGE UP (ref 1–3.3)
LYMPHOCYTES # BLD AUTO: 6.8 % — LOW (ref 13–44)
MAGNESIUM SERPL-MCNC: 2.3 MG/DL — SIGNIFICANT CHANGE UP (ref 1.6–2.6)
MCHC RBC-ENTMCNC: 22.7 PG — LOW (ref 27–34)
MCHC RBC-ENTMCNC: 28.5 GM/DL — LOW (ref 32–36)
MCV RBC AUTO: 79.7 FL — LOW (ref 80–100)
MONOCYTES # BLD AUTO: 1.47 K/UL — HIGH (ref 0–0.9)
MONOCYTES NFR BLD AUTO: 7 % — SIGNIFICANT CHANGE UP (ref 2–14)
NEUTROPHILS # BLD AUTO: 17.41 K/UL — HIGH (ref 1.8–7.4)
NEUTROPHILS NFR BLD AUTO: 83.3 % — HIGH (ref 43–77)
NIGHT BLUE STAIN TISS: SIGNIFICANT CHANGE UP
NRBC # BLD: 3 /100 WBCS — HIGH (ref 0–0)
PHOSPHATE SERPL-MCNC: 2.8 MG/DL — SIGNIFICANT CHANGE UP (ref 2.5–4.5)
PLATELET # BLD AUTO: 210 K/UL — SIGNIFICANT CHANGE UP (ref 150–400)
POTASSIUM SERPL-MCNC: 4.7 MMOL/L — SIGNIFICANT CHANGE UP (ref 3.5–5.3)
POTASSIUM SERPL-SCNC: 4.7 MMOL/L — SIGNIFICANT CHANGE UP (ref 3.5–5.3)
PROT SERPL-MCNC: 5.4 G/DL — LOW (ref 6–8.3)
RBC # BLD: 3.74 M/UL — LOW (ref 3.8–5.2)
RBC # FLD: 26.9 % — HIGH (ref 10.3–14.5)
SODIUM SERPL-SCNC: 139 MMOL/L — SIGNIFICANT CHANGE UP (ref 135–145)
SPECIMEN SOURCE: SIGNIFICANT CHANGE UP
SPECIMEN SOURCE: SIGNIFICANT CHANGE UP
WBC # BLD: 20.91 K/UL — HIGH (ref 3.8–10.5)
WBC # FLD AUTO: 20.91 K/UL — HIGH (ref 3.8–10.5)

## 2023-08-15 PROCEDURE — 99233 SBSQ HOSP IP/OBS HIGH 50: CPT

## 2023-08-15 PROCEDURE — 71045 X-RAY EXAM CHEST 1 VIEW: CPT | Mod: 26

## 2023-08-15 PROCEDURE — 93306 TTE W/DOPPLER COMPLETE: CPT | Mod: 26

## 2023-08-15 RX ORDER — INSULIN GLARGINE 100 [IU]/ML
32 INJECTION, SOLUTION SUBCUTANEOUS AT BEDTIME
Refills: 0 | Status: DISCONTINUED | OUTPATIENT
Start: 2023-08-15 | End: 2023-08-16

## 2023-08-15 RX ORDER — NYSTATIN 500MM UNIT
500000 POWDER (EA) MISCELLANEOUS
Refills: 0 | Status: DISCONTINUED | OUTPATIENT
Start: 2023-08-15 | End: 2023-09-06

## 2023-08-15 RX ORDER — SODIUM CHLORIDE 9 MG/ML
4 INJECTION INTRAMUSCULAR; INTRAVENOUS; SUBCUTANEOUS EVERY 6 HOURS
Refills: 0 | Status: DISCONTINUED | OUTPATIENT
Start: 2023-08-15 | End: 2023-09-06

## 2023-08-15 RX ORDER — BUDESONIDE, MICRONIZED 100 %
0.5 POWDER (GRAM) MISCELLANEOUS
Refills: 0 | Status: DISCONTINUED | OUTPATIENT
Start: 2023-08-15 | End: 2023-09-06

## 2023-08-15 RX ORDER — INSULIN LISPRO 100/ML
11 VIAL (ML) SUBCUTANEOUS
Refills: 0 | Status: DISCONTINUED | OUTPATIENT
Start: 2023-08-15 | End: 2023-08-16

## 2023-08-15 RX ADMIN — Medication 3 MILLILITER(S): at 18:11

## 2023-08-15 RX ADMIN — SODIUM CHLORIDE 4 MILLILITER(S): 9 INJECTION INTRAMUSCULAR; INTRAVENOUS; SUBCUTANEOUS at 23:17

## 2023-08-15 RX ADMIN — Medication 1 TABLET(S): at 11:20

## 2023-08-15 RX ADMIN — SODIUM CHLORIDE 4 MILLILITER(S): 9 INJECTION INTRAMUSCULAR; INTRAVENOUS; SUBCUTANEOUS at 06:05

## 2023-08-15 RX ADMIN — Medication 11 UNIT(S): at 09:08

## 2023-08-15 RX ADMIN — Medication 11 UNIT(S): at 18:08

## 2023-08-15 RX ADMIN — Medication 3 MILLILITER(S): at 11:19

## 2023-08-15 RX ADMIN — Medication 11 UNIT(S): at 13:34

## 2023-08-15 RX ADMIN — Medication 40 MILLIGRAM(S): at 18:11

## 2023-08-15 RX ADMIN — Medication 4: at 09:08

## 2023-08-15 RX ADMIN — MEXILETINE HYDROCHLORIDE 200 MILLIGRAM(S): 150 CAPSULE ORAL at 21:13

## 2023-08-15 RX ADMIN — Medication 0.5 MILLIGRAM(S): at 18:12

## 2023-08-15 RX ADMIN — POLYETHYLENE GLYCOL 3350 17 GRAM(S): 17 POWDER, FOR SOLUTION ORAL at 05:57

## 2023-08-15 RX ADMIN — APIXABAN 5 MILLIGRAM(S): 2.5 TABLET, FILM COATED ORAL at 05:57

## 2023-08-15 RX ADMIN — MEXILETINE HYDROCHLORIDE 200 MILLIGRAM(S): 150 CAPSULE ORAL at 13:33

## 2023-08-15 RX ADMIN — Medication 500000 UNIT(S): at 18:18

## 2023-08-15 RX ADMIN — Medication 0.25 MILLIGRAM(S): at 05:57

## 2023-08-15 RX ADMIN — Medication 3 MILLILITER(S): at 23:17

## 2023-08-15 RX ADMIN — Medication 2: at 13:33

## 2023-08-15 RX ADMIN — Medication 30 MILLILITER(S): at 18:18

## 2023-08-15 RX ADMIN — Medication 3 MILLILITER(S): at 05:58

## 2023-08-15 RX ADMIN — TIOTROPIUM BROMIDE 2 PUFF(S): 18 CAPSULE ORAL; RESPIRATORY (INHALATION) at 11:19

## 2023-08-15 RX ADMIN — ATORVASTATIN CALCIUM 20 MILLIGRAM(S): 80 TABLET, FILM COATED ORAL at 21:13

## 2023-08-15 RX ADMIN — ERTAPENEM SODIUM 120 MILLIGRAM(S): 1 INJECTION, POWDER, LYOPHILIZED, FOR SOLUTION INTRAMUSCULAR; INTRAVENOUS at 18:12

## 2023-08-15 RX ADMIN — SODIUM CHLORIDE 4 MILLILITER(S): 9 INJECTION INTRAMUSCULAR; INTRAVENOUS; SUBCUTANEOUS at 18:17

## 2023-08-15 RX ADMIN — MEXILETINE HYDROCHLORIDE 200 MILLIGRAM(S): 150 CAPSULE ORAL at 05:57

## 2023-08-15 RX ADMIN — APIXABAN 5 MILLIGRAM(S): 2.5 TABLET, FILM COATED ORAL at 18:11

## 2023-08-15 RX ADMIN — Medication 40 MILLIGRAM(S): at 05:57

## 2023-08-15 RX ADMIN — CHLORHEXIDINE GLUCONATE 1 APPLICATION(S): 213 SOLUTION TOPICAL at 11:18

## 2023-08-15 RX ADMIN — INSULIN GLARGINE 32 UNIT(S): 100 INJECTION, SOLUTION SUBCUTANEOUS at 21:13

## 2023-08-15 NOTE — PROGRESS NOTE ADULT - ASSESSMENT
71 yo F with PMHx  asthma on chronic steroids, bronchiectasis, allergic rhinitis,  recurrent PNA, PND, tracheomalacia s/p tracheoplasty, tracheobronchomalacia, ESBL proteus infections (sputum),  diabetes, paroxysmal A-fib on Eliquis, colorectal cancer many years ago status post colostomy, aortic dissection s/p ascending aorta repair presenting with acute on chronic shortness of breath in the setting of recent completion of abx and tapering of her steroids as an outpatient. She reports increased cough/sputum production, worsened dyspnea/wheezing.  RVP (-). Procal 0.13. CTA neg for PE but showing new mucous secretions in BI, mild RLL BE, RML/RLL TIB opacities and some mild central bilateral GGOs.  Had suspected asthma exacerbation/acute bronchitis but patient reports quality of symptoms different from prior exacerbations and unable to pinpoint difference- noted desaturation when ambulating to bathroom but otherwise normal O2Sat at rest. CXR without overt parenchymal abnormalities/infilatrates to suggest evolving PNA. No role for bronchoscopy at this time given treatment for asthma exacerbation, TBM and lack of overt CXR/CT findings - will continue to try to obtain resp culture noninvasively.    - remains afebrile but with persistent leukocytosis  - send sputum cultures for bacterial, fungal and AFB smear/cx (to eval for NTM) --- one culture sent today, repeat additional culture  - continue hypertonic saline to 7% (currently just on 3% q12h) WITH albuterol neb q6h standing, followed by use of airway clearance device (patient brought her own from home)  - chest PT as tolerated   - cont Budesonide neb should be on 0.5mg q12h   - increase methylprednisolone to 40 mg IV TID and monitor for response  - ID following, recs appreciated, currently on Ertapenem (8/12 -)  - f/u echo results, make require bubble study depending on findings  - PT/OT, OOB to chair as much as possible    Radha Desai MD

## 2023-08-15 NOTE — PROGRESS NOTE ADULT - ATTENDING COMMENTS
This is a 72F with h/o bronchiectasis on chronic steroid, s/p surgery, allergic rhinitis,  recurrent PNA, PND, tracheomalacia s/p tracheoplasty, ESBL proteus infections (sputum), T2DM, paroxysmal A-fib on Eliquis, colorectal cancer many years ago status post colostomy presenting with shortness of breath for 3 days. Prior issues with ESBL/Proteus/yeast in sputum culture and was treated with ertapenem/Benadryl/vancomycin/aztreonam and methylprednisolone. She was discharged home with a midline and completed a course of antibiotics ertapenem (last dose 06/25). Recently she was treated for MRSA and pseudomonas in sputum and completed Tobra nebs and doxycycline.    1. Acute on chronic hypoxic RF due to exacerbation of bronchiectasis (on chronic steroid)  2. Recent tracheo-bronchitis due to ESBL proteus/MRSA/pseudomonas  3. Paroxysmal A-fib on Eliquis  4. H/o colorectal cancer, status post colostomy    - Feels better, resting in bed, less cough, c/o SOB on exertion, no chest pain, remains afebrile. WBC 20, O2 sat 99% RA in rest  - ID and Pulmonary plans noted- on bronchodilators, IV/inhaled steroids, IV Ertapenem  - will send sputum c/s today  - Elevated WBC is likely from IV steroid. CT chest shows unchanged infectious/inflammatory small airways disease in the right middle/lower lobes with multifocal small tree-in-bud nodules. Status post ascending aortic and aortic valve replacement, with residual dissection flap better seen on the prior study. Stable indeterminate small left renal nodule and numerous pancreatic cysts.  - PT, O2 sat on ambulation  - Echo result pending  - c/w Eliquis, Lantus/Admelog at current doses, FS elevated due to IV steroid   - Her outpatient Pul- Dr Allison is aware This is a 72F with h/o bronchiectasis on chronic steroid, s/p surgery, allergic rhinitis,  recurrent PNA, PND, tracheomalacia s/p tracheoplasty, ESBL proteus infections (sputum), T2DM, paroxysmal A-fib on Eliquis, colorectal cancer many years ago status post colostomy presenting with shortness of breath for 3 days. Prior issues with ESBL/Proteus/yeast in sputum culture and was treated with ertapenem/Benadryl/vancomycin/aztreonam and methylprednisolone. She was discharged home with a midline and completed a course of antibiotics ertapenem (last dose 06/25). Recently she was treated for MRSA and pseudomonas in sputum and completed Tobra nebs and doxycycline.    1. Acute on chronic hypoxic RF due to exacerbation of bronchiectasis (on chronic steroid)  2. Recent tracheo-bronchitis due to ESBL proteus/MRSA/pseudomonas  3. Paroxysmal A-fib on Eliquis  4. H/o colorectal cancer, status post colostomy    - Feels better, resting in bed, less cough, c/o SOB on exertion, no chest pain, remains afebrile. WBC 20, O2 sat 99% RA in rest  - ID and Pulmonary plans noted- on bronchodilators, IV/inhaled steroids, IV Ertapenem.  - Hypertonic saline to 7% with bronchodilators followed by use of airway clearance device (patient brought her own from home)  - chest PT as tolerated   - will send sputum c/s today  - Elevated WBC is likely from IV steroid. CT chest shows unchanged infectious/inflammatory small airways disease in the right middle/lower lobes with multifocal small tree-in-bud nodules. Status post ascending aortic and aortic valve replacement, with residual dissection flap better seen on the prior study. Stable indeterminate small left renal nodule and numerous pancreatic cysts.  - PT, O2 sat on ambulation  - Echo result pending  - c/w Eliquis, Lantus/Admelog at current doses, FS elevated due to IV steroid   - Her outpatient Pul- Dr Allison is aware This is a 72F with h/o bronchiectasis on chronic steroid, s/p surgery, allergic rhinitis,  recurrent PNA, PND, tracheomalacia s/p tracheoplasty, ESBL proteus infections (sputum), T2DM, paroxysmal A-fib on Eliquis, colorectal cancer many years ago status post colostomy presenting with shortness of breath for 3 days. Prior issues with ESBL/Proteus/yeast in sputum culture and was treated with ertapenem/Benadryl/vancomycin/aztreonam and methylprednisolone. She was discharged home with a midline and completed a course of antibiotics ertapenem (last dose 06/25). Recently she was treated for MRSA and pseudomonas in sputum and completed Tobra nebs and doxycycline.    1. Acute on chronic hypoxic RF due to exacerbation of bronchiectasis (on chronic steroid)  2. Recent tracheo-bronchitis due to ESBL proteus/MRSA/pseudomonas  3. Paroxysmal A-fib on Eliquis  4. H/o colorectal cancer, status post colostomy    - Feels better, resting in bed, less cough, c/o SOB on exertion, no chest pain, remains afebrile. WBC 20, O2 sat 99% RA in rest  - ID and Pulmonary plans noted- on bronchodilators, IV/inhaled steroids, IV Ertapenem. sent sputum c/s today  - Hypertonic saline to 7% with bronchodilators followed by use of airway clearance device (patient brought her own from home) and chest PT as tolerated   - Elevated WBC is likely from IV steroid. CT chest shows unchanged infectious/inflammatory small airways disease in the right middle/lower lobes with multifocal small tree-in-bud nodules. Status post ascending aortic and aortic valve replacement, with residual dissection flap better seen on the prior study. Stable indeterminate small left renal nodule and numerous pancreatic cysts.  - PT, O2 sat on ambulation  - Echo result pending  - c/w Eliquis, Lantus/Admelog at current doses, FS elevated due to IV steroid   - Her outpatient Pul- Dr Allison is aware

## 2023-08-15 NOTE — PROGRESS NOTE ADULT - PROBLEM SELECTOR PLAN 4
-on 25-30 lantus at home, and 7-20 mealtime as well  -ISS moderate risk   -change to 32 lantus, and 11 meal time for now.  - may need to uptitrate given increase in steroids and hx of uncontrolled diabetes.

## 2023-08-15 NOTE — CHART NOTE - NSCHARTNOTEFT_GEN_A_CORE
Wound Care Team Note:    Request for wound care consult for foot wound received from team. Ms. Bloom was seen and is being managed by Podiatry. Will defer to podiatry for management. Will not follow. Please refer any questions or concerns to podiatry.    Olamide Lewis, NP-C, CWOCN

## 2023-08-15 NOTE — PROGRESS NOTE ADULT - ASSESSMENT
71 yo PMHx  asthma on chronic steroids, bronchiectasis s/p surgery, allergic rhinitis,  recurrent PNA, PND, tracheomalacia s/p tracheoplasty, ESBL proteus infections (sputum),  diabetes, paroxysmal A-fib on Eliquis, colorectal cancer many years ago status post colostomy presenting with concern for shortness of breath. Prior issues with ESBL/Proteus/yeast in sputum culture and was treated with ertapenem/Benadryl/vancomycin/aztreonam and methylprednisolone.   She was discharged home with a midline and completed a course of antibiotics ertapenem (last dose 06/25) PT now readmitted with severe dyspnea and reports that having had asthma for 61 years she feels like she needs an antibiotic. Reports not tolerating meropenem. Patient follows with Dr Allison who knows this complicated pt very well    RECOMMENDATIONS  -no evidence of airspace disease so more of a bronchiectasis exacerbation  -recent sputums with ESBL proteus so having tolerated ertapenem will restart  -ertapenem started 8/12 -- continue for now   -leukocytosis noted - patient on IV steroids - increased 8/14, suspect may be reactive   - no new complaints or focal findings on exam; remains afebrile, trend noted   Pulmonary following, follow up cultures   Supportive care, chest PT, neb treatments   Monitor temps/CBC    Tello Fernandez M.D.  [facility], Division of Infectious Diseases  [phone]  After 5pm on weekdays and all day on weekends - please call [phone]

## 2023-08-15 NOTE — PROGRESS NOTE ADULT - ASSESSMENT
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 P/w SOB, found to have bronchial secretions on CT, admit to medicine for exacerbation of bronchiectasis.

## 2023-08-15 NOTE — PROGRESS NOTE ADULT - SUBJECTIVE AND OBJECTIVE BOX
Interval History:  Patient seen and examined this morning.   Continues to have cough, no fevers or pain.       Allergies: tetanus toxoid (Short breath)  cefepime (Anaphylaxis)  penicillin (Anaphylaxis)  Avelox (Short breath; Pruritus)  Dilaudid (Short breath)  codeine (Short breath)  aspirin (Short breath)  iodine (Short breath; Swelling)  Valium (Short breath)  shellfish (Anaphylaxis)    Objective:  Vitals:   T(F): 98.3 (08-15-23 @ 04:45), Max: 98.3 (08-15-23 @ 04:45)  HR: 79 (08-15-23 @ 04:45) (79 - 80)  BP: 109/54 (08-15-23 @ 04:45) (109/54 - 145/64)  RR: 18 (08-15-23 @ 04:45) (18 - 23)  SpO2: 99% (08-15-23 @ 04:45) (98% - 100%)  Physical Examination:  General: no acute distress  HEENT: NC/AT, EOMI, anicteric, neck supple  Cardio: S1, S2 present, normal rate  Resp: coarse breath sounds bilaterally  Abd: soft, NT, ND, + BS  Neuro: AAOx3, no obvious focal deficits  Ext: no edema, no cyanosis, moving extremities  Skin: warm, dry, no visible rash  Psych: appropriate affect and mood for situation  Lines: PIV    Laboratory Studies:  CBC:                       8.5    20.91 )-----------( 210      ( 15 Aug 2023 07:04 )             29.8     WBC Trend:  20.91 08-15-23 @ 07:04  19.37 08-14-23 @ 07:25  16.73 08-13-23 @ 07:21  9.05 08-12-23 @ 07:17  10.56 08-11-23 @ 16:05    CMP: 08-15    139  |  99  |  18  ----------------------------<  249<H>  4.7   |  27  |  0.71    Ca    8.8      15 Aug 2023 07:02  Phos  2.8     08-15  Mg     2.3     08-15    TPro  5.4<L>  /  Alb  3.4  /  TBili  0.6  /  DBili  x   /  AST  39  /  ALT  17  /  AlkPhos  66  08-15    Creatinine: 0.71 mg/dL (08-15-23 @ 07:02)  Creatinine: 0.76 mg/dL (08-14-23 @ 07:21)  Creatinine: 0.68 mg/dL (08-13-23 @ 07:22)  Creatinine: 0.67 mg/dL (08-12-23 @ 07:21)  Creatinine: 0.80 mg/dL (08-11-23 @ 16:05)    LIVER FUNCTIONS - ( 15 Aug 2023 07:02 )  Alb: 3.4 g/dL / Pro: 5.4 g/dL / ALK PHOS: 66 U/L / ALT: 17 U/L / AST: 39 U/L / GGT: x           Microbiology: reviewed   Culture - Blood (collected 08-11-23 @ 16:00)  Source: .Blood Blood-Peripheral  Preliminary Report (08-14-23 @ 20:01):    No growth at 72 Hours    Culture - Blood (collected 08-11-23 @ 15:15)  Source: .Blood Blood-Peripheral  Preliminary Report (08-14-23 @ 20:01):    No growth at 72 Hours    SARS-CoV-2 Result: NotDetec (11 Aug 2023 16:06)    Radiology: reviewed     Medications:  acetaminophen     Tablet .. 650 milliGRAM(s) Oral every 6 hours PRN  albuterol/ipratropium for Nebulization 3 milliLiter(s) Nebulizer every 6 hours  aluminum hydroxide/magnesium hydroxide/simethicone Suspension 30 milliLiter(s) Oral every 4 hours PRN  apixaban 5 milliGRAM(s) Oral every 12 hours  atorvastatin 20 milliGRAM(s) Oral at bedtime  buDESOnide    Inhalation Suspension 0.5 milliGRAM(s) Inhalation two times a day  chlorhexidine 4% Liquid 1 Application(s) Topical daily  dextrose 5%. 1000 milliLiter(s) IV Continuous <Continuous>  dextrose 5%. 1000 milliLiter(s) IV Continuous <Continuous>  dextrose 50% Injectable 25 Gram(s) IV Push once  dextrose 50% Injectable 12.5 Gram(s) IV Push once  dextrose 50% Injectable 25 Gram(s) IV Push once  dextrose Oral Gel 15 Gram(s) Oral once PRN  ertapenem  IVPB 1000 milliGRAM(s) IV Intermittent every 24 hours  glucagon  Injectable 1 milliGRAM(s) IntraMuscular once  insulin glargine Injectable (LANTUS) 32 Unit(s) SubCutaneous at bedtime  insulin lispro (ADMELOG) corrective regimen sliding scale   SubCutaneous three times a day before meals  insulin lispro (ADMELOG) corrective regimen sliding scale   SubCutaneous at bedtime  insulin lispro Injectable (ADMELOG) 11 Unit(s) SubCutaneous three times a day with meals  melatonin 3 milliGRAM(s) Oral at bedtime PRN  methylPREDNISolone sodium succinate Injectable 40 milliGRAM(s) IV Push two times a day  mexiletine 200 milliGRAM(s) Oral every 8 hours  ondansetron Injectable 4 milliGRAM(s) IV Push every 8 hours PRN  polyethylene glycol 3350 17 Gram(s) Oral two times a day  sodium chloride 7% Inhalation 4 milliLiter(s) Inhalation every 12 hours  tiotropium 2.5 MICROgram(s) Inhaler 2 Puff(s) Inhalation daily  trimethoprim  160 mG/sulfamethoxazole 800 mG 1 Tablet(s) Oral daily    Current Antimicrobials:  ertapenem  IVPB 1000 milliGRAM(s) IV Intermittent every 24 hours  trimethoprim  160 mG/sulfamethoxazole 800 mG 1 Tablet(s) Oral daily    Prior/Completed Antimicrobials:  ertapenem  IVPB

## 2023-08-15 NOTE — PROGRESS NOTE ADULT - SUBJECTIVE AND OBJECTIVE BOX
[facility] DIVISION OF [facility]FECTIOUS DISEASES  GISSELL Uriostegui Y. Patel, S. Shah, G. Terrell  [phone]  ([phone] - weekdays after 5pm and weekends)    Name: RAYRAY RODRIGUEZ  Age/Gender: 72y Female  MRN: [id number]    Interval History:  Patient seen and examined this morning.   Continues to have cough, no fevers or pain.   Notes reviewed. No concerning overnight events.  Afebrile.     Allergies: tetanus toxoid (Short breath)  cefepime (Anaphylaxis)  penicillin (Anaphylaxis)  Avelox (Short breath; Pruritus)  Dilaudid (Short breath)  codeine (Short breath)  aspirin (Short breath)  iodine (Short breath; Swelling)  Valium (Short breath)  shellfish (Anaphylaxis)    Objective:  Vitals:   T(F): 98.3 (08-15-23 @ 04:45), Max: 98.3 (08-15-23 @ 04:45)  HR: 79 (08-15-23 @ 04:45) (79 - 80)  BP: 109/54 (08-15-23 @ 04:45) (109/54 - 145/64)  RR: 18 (08-15-23 @ 04:45) (18 - 23)  SpO2: 99% (08-15-23 @ 04:45) (98% - 100%)  Physical Examination:  General: no acute distress  HEENT: NC/AT, EOMI, anicteric, neck supple  Cardio: S1, S2 present, normal rate  Resp: coarse breath sounds bilaterally  Abd: soft, NT, ND, + BS  Neuro: AAOx3, no obvious focal deficits  Ext: no edema, no cyanosis, moving extremities  Skin: warm, dry, no visible rash  Psych: appropriate affect and mood for situation  Lines: PIV    Laboratory Studies:  CBC:                       8.5    20.91 )-----------( 210      ( 15 Aug 2023 07:04 )             29.8     WBC Trend:  20.91 08-15-23 @ 07:04  19.37 08-14-23 @ 07:25  16.73 08-13-23 @ 07:21  9.05 08-12-23 @ 07:17  10.56 08-11-23 @ 16:05    CMP: 08-15    139  |  99  |  18  ----------------------------<  249<H>  4.7   |  27  |  0.71    Ca    8.8      15 Aug 2023 07:02  Phos  2.8     08-15  Mg     2.3     08-15    TPro  5.4<L>  /  Alb  3.4  /  TBili  0.6  /  DBili  x   /  AST  39  /  ALT  17  /  AlkPhos  66  08-15    Creatinine: 0.71 mg/dL (08-15-23 @ 07:02)  Creatinine: 0.76 mg/dL (08-14-23 @ 07:21)  Creatinine: 0.68 mg/dL (08-13-23 @ 07:22)  Creatinine: 0.67 mg/dL (08-12-23 @ 07:21)  Creatinine: 0.80 mg/dL (08-11-23 @ 16:05)    LIVER FUNCTIONS - ( 15 Aug 2023 07:02 )  Alb: 3.4 g/dL / Pro: 5.4 g/dL / ALK PHOS: 66 U/L / ALT: 17 U/L / AST: 39 U/L / GGT: x           Microbiology: reviewed   Culture - Blood (collected 08-11-23 @ 16:00)  Source: .Blood Blood-Peripheral  Preliminary Report (08-14-23 @ 20:01):    No growth at 72 Hours    Culture - Blood (collected 08-11-23 @ 15:15)  Source: .Blood Blood-Peripheral  Preliminary Report (08-14-23 @ 20:01):    No growth at 72 Hours    SARS-CoV-2 Result: NotDete (11 Aug 2023 16:06)    Radiology: reviewed     Medications:  acetaminophen     Tablet .. 650 milliGRAM(s) Oral every 6 hours PRN  albuterol/ipratropium for Nebulization 3 milliLiter(s) Nebulizer every 6 hours  aluminum hydroxide/magnesium hydroxide/simethicone Suspension 30 milliLiter(s) Oral every 4 hours PRN  apixaban 5 milliGRAM(s) Oral every 12 hours  atorvastatin 20 milliGRAM(s) Oral at bedtime  buDESOnide    Inhalation Suspension 0.5 milliGRAM(s) Inhalation two times a day  chlorhexidine 4% Liquid 1 Application(s) Topical daily  dextrose 5%. 1000 milliLiter(s) IV Continuous <Continuous>  dextrose 5%. 1000 milliLiter(s) IV Continuous <Continuous>  dextrose 50% Injectable 25 Gram(s) IV Push once  dextrose 50% Injectable 12.5 Gram(s) IV Push once  dextrose 50% Injectable 25 Gram(s) IV Push once  dextrose Oral Gel 15 Gram(s) Oral once PRN  ertapenem  IVPB 1000 milliGRAM(s) IV Intermittent every 24 hours  glucagon  Injectable 1 milliGRAM(s) IntraMuscular once  insulin glargine Injectable (LANTUS) 32 Unit(s) SubCutaneous at bedtime  insulin lispro (ADMELOG) corrective regimen sliding scale   SubCutaneous three times a day before meals  insulin lispro (ADMELOG) corrective regimen sliding scale   SubCutaneous at bedtime  insulin lispro Injectable (ADMELOG) 11 Unit(s) SubCutaneous three times a day with meals  melatonin 3 milliGRAM(s) Oral at bedtime PRN  methylPREDNISolone sodium succinate Injectable 40 milliGRAM(s) IV Push two times a day  mexiletine 200 milliGRAM(s) Oral every 8 hours  ondansetron Injectable 4 milliGRAM(s) IV Push every 8 hours PRN  polyethylene glycol 3350 17 Gram(s) Oral two times a day  sodium chloride 7% Inhalation 4 milliLiter(s) Inhalation every 12 hours  tiotropium 2.5 MICROgram(s) Inhaler 2 Puff(s) Inhalation daily  trimethoprim  160 mG/sulfamethoxazole 800 mG 1 Tablet(s) Oral daily    Current Antimicrobials:  ertapenem  IVPB 1000 milliGRAM(s) IV Intermittent every 24 hours  trimethoprim  160 mG/sulfamethoxazole 800 mG 1 Tablet(s) Oral daily    Prior/Completed Antimicrobials:  ertapenem  IVPB

## 2023-08-15 NOTE — PROGRESS NOTE ADULT - SUBJECTIVE AND OBJECTIVE BOX
Lázaro Molina, PGY-1  Please contact on Teams    RAYRAY RODRIGUEZ  72y  Female    Reason for Admission:       SUBJECTIVE/INTERVAL EVENTS: No acute events. Pt seen and examined at bedside.    PHYSICAL EXAM:  GENERAL: NAD, lying comfortably in bed   HEAD:  Atraumatic, Normocephalic  EYES: EOMI b/l, PERRLA b/l, conjunctiva and sclera clear  NECK: Supple, No JVD, No LAD   CHEST/LUNG: coarse breath sounds, improved from prior  HEART: Regular rate and rhythm; S1 and S2 present, + Systolic ejection murmur  ABDOMEN: Soft, Nontender, Nondistended; Bowel sounds present  EXTREMITIES:  No clubbing, cyanosis, or edema  NEURO: AAOx3, non-focal   SKIN: No rashes or lesions    Vital Signs Last 24 Hrs  T(C): 36.8 (15 Aug 2023 04:45), Max: 36.8 (15 Aug 2023 04:45)  T(F): 98.3 (15 Aug 2023 04:45), Max: 98.3 (15 Aug 2023 04:45)  HR: 79 (15 Aug 2023 04:45) (79 - 80)  BP: 109/54 (15 Aug 2023 04:45) (109/54 - 145/64)  BP(mean): --  RR: 18 (15 Aug 2023 04:45) (18 - 23)  SpO2: 99% (15 Aug 2023 04:45) (98% - 100%)    Parameters below as of 15 Aug 2023 04:45  Patient On (Oxygen Delivery Method): room air        Consultant(s) Notes Reviewed:  [x] YES  [ ] NO  Care Discussed with Consultants/Other Providers [x] YES  [ ] NO    LABS:                        8.5    20.91 )-----------( 210      ( 15 Aug 2023 07:04 )             29.8                         8.2    19.37 )-----------( 240      ( 14 Aug 2023 07:25 )             28.6                         8.6    16.73 )-----------( 180      ( 13 Aug 2023 07:21 )             31.7         Urinalysis Basic - ( 14 Aug 2023 07:21 )    Color: x / Appearance: x / SG: x / pH: x  Gluc: 326 mg/dL / Ketone: x  / Bili: x / Urobili: x   Blood: x / Protein: x / Nitrite: x   Leuk Esterase: x / RBC: x / WBC x   Sq Epi: x / Non Sq Epi: x / Bacteria: x        RADIOLOGY & ADDITIONAL TESTS:    Imaging Personally Reviewed:  [x] YES  [ ] NO    MEDICATIONS  (STANDING):  albuterol/ipratropium for Nebulization 3 milliLiter(s) Nebulizer every 6 hours  apixaban 5 milliGRAM(s) Oral every 12 hours  atorvastatin 20 milliGRAM(s) Oral at bedtime  buDESOnide    Inhalation Suspension 0.5 milliGRAM(s) Inhalation two times a day  chlorhexidine 4% Liquid 1 Application(s) Topical daily  dextrose 5%. 1000 milliLiter(s) (100 mL/Hr) IV Continuous <Continuous>  dextrose 5%. 1000 milliLiter(s) (50 mL/Hr) IV Continuous <Continuous>  dextrose 50% Injectable 25 Gram(s) IV Push once  dextrose 50% Injectable 12.5 Gram(s) IV Push once  dextrose 50% Injectable 25 Gram(s) IV Push once  ertapenem  IVPB 1000 milliGRAM(s) IV Intermittent every 24 hours  glucagon  Injectable 1 milliGRAM(s) IntraMuscular once  insulin glargine Injectable (LANTUS) 32 Unit(s) SubCutaneous at bedtime  insulin lispro (ADMELOG) corrective regimen sliding scale   SubCutaneous three times a day before meals  insulin lispro (ADMELOG) corrective regimen sliding scale   SubCutaneous at bedtime  insulin lispro Injectable (ADMELOG) 11 Unit(s) SubCutaneous three times a day with meals  methylPREDNISolone sodium succinate Injectable 40 milliGRAM(s) IV Push two times a day  mexiletine 200 milliGRAM(s) Oral every 8 hours  polyethylene glycol 3350 17 Gram(s) Oral two times a day  sodium chloride 7% Inhalation 4 milliLiter(s) Inhalation every 12 hours  tiotropium 2.5 MICROgram(s) Inhaler 2 Puff(s) Inhalation daily  trimethoprim  160 mG/sulfamethoxazole 800 mG 1 Tablet(s) Oral daily    MEDICATIONS  (PRN):  acetaminophen     Tablet .. 650 milliGRAM(s) Oral every 6 hours PRN Temp greater or equal to 38C (100.4F), Mild Pain (1 - 3)  aluminum hydroxide/magnesium hydroxide/simethicone Suspension 30 milliLiter(s) Oral every 4 hours PRN Dyspepsia  dextrose Oral Gel 15 Gram(s) Oral once PRN Blood Glucose LESS THAN 70 milliGRAM(s)/deciliter  melatonin 3 milliGRAM(s) Oral at bedtime PRN Insomnia  ondansetron Injectable 4 milliGRAM(s) IV Push every 8 hours PRN Nausea and/or Vomiting      HEALTH ISSUES - PROBLEM Dx:  Acute exacerbation of bronchiectasis    Acute asthma exacerbation    SIRS without infection or organ dysfunction    Atrial fibrillation  paroxysmal, on eliquis    Diabetes mellitus    Prophylactic measure

## 2023-08-15 NOTE — PROGRESS NOTE ADULT - PROBLEM SELECTOR PLAN 1
- CT chest showed increased bronchial secretions, on RA saturating well, speaking in full sentences, unclear trigger, likely due to infection, or inflammation.     - Sputum culture pending  - urine legionella, strep neg  - RVP negative  - hypertonic saline 7% and albuterol neb q6h standing, followed by use of airway clearance device (patient brought her own from home)  - chest PT as tolerated   - budesonide 0.5mg neb q12h standing   - c/w methylprednisolone to 40 mg IV bid for now, with taper to 40 mg IV daily once improving  - pulm following, appreciate recs

## 2023-08-16 LAB
ALBUMIN SERPL ELPH-MCNC: 3.4 G/DL — SIGNIFICANT CHANGE UP (ref 3.3–5)
ALP SERPL-CCNC: 77 U/L — SIGNIFICANT CHANGE UP (ref 40–120)
ALT FLD-CCNC: 25 U/L — SIGNIFICANT CHANGE UP (ref 10–45)
ANION GAP SERPL CALC-SCNC: 13 MMOL/L — SIGNIFICANT CHANGE UP (ref 5–17)
ANISOCYTOSIS BLD QL: SIGNIFICANT CHANGE UP
AST SERPL-CCNC: 44 U/L — HIGH (ref 10–40)
BASOPHILS # BLD AUTO: 0 K/UL — SIGNIFICANT CHANGE UP (ref 0–0.2)
BASOPHILS NFR BLD AUTO: 0 % — SIGNIFICANT CHANGE UP (ref 0–2)
BILIRUB SERPL-MCNC: 0.5 MG/DL — SIGNIFICANT CHANGE UP (ref 0.2–1.2)
BUN SERPL-MCNC: 22 MG/DL — SIGNIFICANT CHANGE UP (ref 7–23)
CALCIUM SERPL-MCNC: 9 MG/DL — SIGNIFICANT CHANGE UP (ref 8.4–10.5)
CHLORIDE SERPL-SCNC: 98 MMOL/L — SIGNIFICANT CHANGE UP (ref 96–108)
CO2 SERPL-SCNC: 25 MMOL/L — SIGNIFICANT CHANGE UP (ref 22–31)
CREAT SERPL-MCNC: 0.73 MG/DL — SIGNIFICANT CHANGE UP (ref 0.5–1.3)
CULTURE RESULTS: SIGNIFICANT CHANGE UP
CULTURE RESULTS: SIGNIFICANT CHANGE UP
EGFR: 86 ML/MIN/1.73M2 — SIGNIFICANT CHANGE UP
ELLIPTOCYTES BLD QL SMEAR: SLIGHT — SIGNIFICANT CHANGE UP
EOSINOPHIL # BLD AUTO: 0 K/UL — SIGNIFICANT CHANGE UP (ref 0–0.5)
EOSINOPHIL NFR BLD AUTO: 0 % — SIGNIFICANT CHANGE UP (ref 0–6)
GLUCOSE BLDC GLUCOMTR-MCNC: 134 MG/DL — HIGH (ref 70–99)
GLUCOSE BLDC GLUCOMTR-MCNC: 220 MG/DL — HIGH (ref 70–99)
GLUCOSE BLDC GLUCOMTR-MCNC: 307 MG/DL — HIGH (ref 70–99)
GLUCOSE BLDC GLUCOMTR-MCNC: 335 MG/DL — HIGH (ref 70–99)
GLUCOSE SERPL-MCNC: 330 MG/DL — HIGH (ref 70–99)
HCT VFR BLD CALC: 30.5 % — LOW (ref 34.5–45)
HGB BLD-MCNC: 8.6 G/DL — LOW (ref 11.5–15.5)
LDH SERPL L TO P-CCNC: 1382 U/L — HIGH (ref 50–242)
LYMPHOCYTES # BLD AUTO: 0.97 K/UL — LOW (ref 1–3.3)
LYMPHOCYTES # BLD AUTO: 7 % — LOW (ref 13–44)
MACROCYTES BLD QL: SIGNIFICANT CHANGE UP
MAGNESIUM SERPL-MCNC: 2.3 MG/DL — SIGNIFICANT CHANGE UP (ref 1.6–2.6)
MANUAL SMEAR VERIFICATION: SIGNIFICANT CHANGE UP
MCHC RBC-ENTMCNC: 22.6 PG — LOW (ref 27–34)
MCHC RBC-ENTMCNC: 28.2 GM/DL — LOW (ref 32–36)
MCV RBC AUTO: 80.3 FL — SIGNIFICANT CHANGE UP (ref 80–100)
MICROCYTES BLD QL: SIGNIFICANT CHANGE UP
MONOCYTES # BLD AUTO: 0.36 K/UL — SIGNIFICANT CHANGE UP (ref 0–0.9)
MONOCYTES NFR BLD AUTO: 2.6 % — SIGNIFICANT CHANGE UP (ref 2–14)
NEUTROPHILS # BLD AUTO: 12.54 K/UL — HIGH (ref 1.8–7.4)
NEUTROPHILS NFR BLD AUTO: 90.4 % — HIGH (ref 43–77)
NRBC # BLD: 4 /100 — HIGH (ref 0–0)
PHOSPHATE SERPL-MCNC: 4 MG/DL — SIGNIFICANT CHANGE UP (ref 2.5–4.5)
PLAT MORPH BLD: NORMAL — SIGNIFICANT CHANGE UP
PLATELET # BLD AUTO: 216 K/UL — SIGNIFICANT CHANGE UP (ref 150–400)
POIKILOCYTOSIS BLD QL AUTO: SIGNIFICANT CHANGE UP
POLYCHROMASIA BLD QL SMEAR: SLIGHT — SIGNIFICANT CHANGE UP
POTASSIUM SERPL-MCNC: 4.9 MMOL/L — SIGNIFICANT CHANGE UP (ref 3.5–5.3)
POTASSIUM SERPL-SCNC: 4.9 MMOL/L — SIGNIFICANT CHANGE UP (ref 3.5–5.3)
PROT SERPL-MCNC: 5.6 G/DL — LOW (ref 6–8.3)
RBC # BLD: 3.8 M/UL — SIGNIFICANT CHANGE UP (ref 3.8–5.2)
RBC # FLD: 27.1 % — HIGH (ref 10.3–14.5)
RBC BLD AUTO: ABNORMAL
SCHISTOCYTES BLD QL AUTO: SIGNIFICANT CHANGE UP
SODIUM SERPL-SCNC: 136 MMOL/L — SIGNIFICANT CHANGE UP (ref 135–145)
SPECIMEN SOURCE: SIGNIFICANT CHANGE UP
SPECIMEN SOURCE: SIGNIFICANT CHANGE UP
WBC # BLD: 13.87 K/UL — HIGH (ref 3.8–10.5)
WBC # FLD AUTO: 13.87 K/UL — HIGH (ref 3.8–10.5)

## 2023-08-16 PROCEDURE — 93321 DOPPLER ECHO F-UP/LMTD STD: CPT | Mod: 26

## 2023-08-16 PROCEDURE — 93308 TTE F-UP OR LMTD: CPT | Mod: 26

## 2023-08-16 PROCEDURE — 99233 SBSQ HOSP IP/OBS HIGH 50: CPT

## 2023-08-16 RX ORDER — DIPHENHYDRAMINE HCL 50 MG
25 CAPSULE ORAL DAILY
Refills: 0 | Status: DISCONTINUED | OUTPATIENT
Start: 2023-08-16 | End: 2023-08-30

## 2023-08-16 RX ORDER — INSULIN GLARGINE 100 [IU]/ML
34 INJECTION, SOLUTION SUBCUTANEOUS AT BEDTIME
Refills: 0 | Status: DISCONTINUED | OUTPATIENT
Start: 2023-08-16 | End: 2023-08-17

## 2023-08-16 RX ORDER — INSULIN LISPRO 100/ML
16 VIAL (ML) SUBCUTANEOUS
Refills: 0 | Status: DISCONTINUED | OUTPATIENT
Start: 2023-08-16 | End: 2023-08-21

## 2023-08-16 RX ADMIN — Medication 0.5 MILLIGRAM(S): at 17:33

## 2023-08-16 RX ADMIN — Medication 0.5 MILLIGRAM(S): at 06:02

## 2023-08-16 RX ADMIN — INSULIN GLARGINE 34 UNIT(S): 100 INJECTION, SOLUTION SUBCUTANEOUS at 21:36

## 2023-08-16 RX ADMIN — APIXABAN 5 MILLIGRAM(S): 2.5 TABLET, FILM COATED ORAL at 17:33

## 2023-08-16 RX ADMIN — POLYETHYLENE GLYCOL 3350 17 GRAM(S): 17 POWDER, FOR SOLUTION ORAL at 06:01

## 2023-08-16 RX ADMIN — Medication 3 MILLILITER(S): at 17:32

## 2023-08-16 RX ADMIN — Medication 3 MILLILITER(S): at 11:28

## 2023-08-16 RX ADMIN — Medication 8: at 08:47

## 2023-08-16 RX ADMIN — ERTAPENEM SODIUM 120 MILLIGRAM(S): 1 INJECTION, POWDER, LYOPHILIZED, FOR SOLUTION INTRAMUSCULAR; INTRAVENOUS at 17:59

## 2023-08-16 RX ADMIN — CHLORHEXIDINE GLUCONATE 1 APPLICATION(S): 213 SOLUTION TOPICAL at 11:29

## 2023-08-16 RX ADMIN — Medication 8: at 13:02

## 2023-08-16 RX ADMIN — Medication 11 UNIT(S): at 08:48

## 2023-08-16 RX ADMIN — Medication 500000 UNIT(S): at 17:33

## 2023-08-16 RX ADMIN — Medication 30 MILLILITER(S): at 18:01

## 2023-08-16 RX ADMIN — Medication 25 MILLIGRAM(S): at 17:58

## 2023-08-16 RX ADMIN — Medication 3 MILLILITER(S): at 06:02

## 2023-08-16 RX ADMIN — SODIUM CHLORIDE 4 MILLILITER(S): 9 INJECTION INTRAMUSCULAR; INTRAVENOUS; SUBCUTANEOUS at 06:01

## 2023-08-16 RX ADMIN — TIOTROPIUM BROMIDE 2 PUFF(S): 18 CAPSULE ORAL; RESPIRATORY (INHALATION) at 11:28

## 2023-08-16 RX ADMIN — Medication 500000 UNIT(S): at 06:06

## 2023-08-16 RX ADMIN — Medication 4: at 17:34

## 2023-08-16 RX ADMIN — MEXILETINE HYDROCHLORIDE 200 MILLIGRAM(S): 150 CAPSULE ORAL at 17:32

## 2023-08-16 RX ADMIN — MEXILETINE HYDROCHLORIDE 200 MILLIGRAM(S): 150 CAPSULE ORAL at 21:34

## 2023-08-16 RX ADMIN — APIXABAN 5 MILLIGRAM(S): 2.5 TABLET, FILM COATED ORAL at 06:02

## 2023-08-16 RX ADMIN — ONDANSETRON 4 MILLIGRAM(S): 8 TABLET, FILM COATED ORAL at 18:01

## 2023-08-16 RX ADMIN — SODIUM CHLORIDE 4 MILLILITER(S): 9 INJECTION INTRAMUSCULAR; INTRAVENOUS; SUBCUTANEOUS at 17:37

## 2023-08-16 RX ADMIN — Medication 11 UNIT(S): at 13:02

## 2023-08-16 RX ADMIN — Medication 40 MILLIGRAM(S): at 06:01

## 2023-08-16 RX ADMIN — POLYETHYLENE GLYCOL 3350 17 GRAM(S): 17 POWDER, FOR SOLUTION ORAL at 17:33

## 2023-08-16 RX ADMIN — SODIUM CHLORIDE 4 MILLILITER(S): 9 INJECTION INTRAMUSCULAR; INTRAVENOUS; SUBCUTANEOUS at 11:30

## 2023-08-16 RX ADMIN — MEXILETINE HYDROCHLORIDE 200 MILLIGRAM(S): 150 CAPSULE ORAL at 06:02

## 2023-08-16 RX ADMIN — Medication 1 TABLET(S): at 11:28

## 2023-08-16 RX ADMIN — Medication 16 UNIT(S): at 17:34

## 2023-08-16 RX ADMIN — ATORVASTATIN CALCIUM 20 MILLIGRAM(S): 80 TABLET, FILM COATED ORAL at 21:34

## 2023-08-16 RX ADMIN — Medication 40 MILLIGRAM(S): at 17:33

## 2023-08-16 NOTE — PROGRESS NOTE ADULT - SUBJECTIVE AND OBJECTIVE BOX
Lázaro Molina, PGY-1  Please contact on Teams    RAYRAY RODRIGUEZ  72y  Female    Reason for Admission:       SUBJECTIVE/INTERVAL EVENTS: No acute events. Pt seen and examined at bedside. Reporting subjective improvement in breathing, renewed ability to bring up sputum.    PHYSICAL EXAM:  GENERAL: NAD, lying comfortably in bed   HEAD:  Atraumatic, Normocephalic  EYES: EOMI b/l, PERRLA b/l, conjunctiva and sclera clear  NECK: Supple, No JVD, No LAD   CHEST/LUNG: coarse breath sounds, similar to prior  HEART: Regular rate and rhythm; S1 and S2 present  ABDOMEN: Soft, Nontender, Nondistended; Bowel sounds present  EXTREMITIES:  No clubbing, cyanosis, or edema  NEURO: AAOx3, non-focal   SKIN: No rashes or lesions      Vital Signs Last 24 Hrs  T(C): 36.8 (16 Aug 2023 05:14), Max: 36.8 (16 Aug 2023 05:14)  T(F): 98.2 (16 Aug 2023 05:14), Max: 98.2 (16 Aug 2023 05:14)  HR: 81 (16 Aug 2023 05:14) (79 - 83)  BP: 103/58 (16 Aug 2023 05:14) (103/58 - 122/67)  BP(mean): --  RR: 20 (16 Aug 2023 05:14) (18 - 20)  SpO2: 99% (16 Aug 2023 05:14) (98% - 99%)    Parameters below as of 16 Aug 2023 05:14  Patient On (Oxygen Delivery Method): room air        Consultant(s) Notes Reviewed:  [x] YES  [ ] NO  Care Discussed with Consultants/Other Providers [x] YES  [ ] NO    LABS:                        8.6    13.87 )-----------( 216      ( 16 Aug 2023 06:30 )             30.5                         8.5    20.91 )-----------( 210      ( 15 Aug 2023 07:04 )             29.8                         8.2    19.37 )-----------( 240      ( 14 Aug 2023 07:25 )             28.6                               136    |  98     |  22                  Calcium: 9.0   / iCa: x      (08-16 @ 06:31)    ----------------------------<  330       Magnesium: 2.3                              4.9     |  25     |  0.73             Phosphorous: 4.0      TPro  5.6    /  Alb  3.4    /  TBili  0.5    /  DBili  x      /  AST  44     /  ALT  25     /  AlkPhos  77     16 Aug 2023 06:31    Urinalysis Basic - ( 16 Aug 2023 06:31 )    Color: x / Appearance: x / SG: x / pH: x  Gluc: 330 mg/dL / Ketone: x  / Bili: x / Urobili: x   Blood: x / Protein: x / Nitrite: x   Leuk Esterase: x / RBC: x / WBC x   Sq Epi: x / Non Sq Epi: x / Bacteria: x    .Sputum Sputum  08-15 @ 12:49 --  --    Rare Squamous epithelial cells per low power field  Rare polymorphonuclear leukocytes per low power field  Rare Yeast like cells per oil power field      .Sputum Sputum  08-14 @ 13:11 --  --  --      .Blood Blood-Peripheral  08-11 @ 16:00   No growth at 4 days  --  --      .Blood Blood-Peripheral  08-11 @ 15:15   No growth at 4 days  --  --      .Sputum Sputum  06-09 @ 16:18   Numerous Proteus mirabilis ESBL  Normal Respiratory Jessica present  --  Proteus mirabilis ESBL      .Blood Blood-Peripheral  06-05 @ 04:00   No Growth Final  --  --      Clean Catch Clean Catch (Midstream)  06-05 @ 03:40   10,000 - 49,000 CFU/mL Proteus mirabilis ESBL  <10,000 CFU/ml Normal Urogenital jessica present  --  Proteus mirabilis ESBL            RADIOLOGY & ADDITIONAL TESTS:    Imaging Personally Reviewed:  [x] YES  [ ] NO    MEDICATIONS  (STANDING):  albuterol/ipratropium for Nebulization 3 milliLiter(s) Nebulizer every 6 hours  apixaban 5 milliGRAM(s) Oral every 12 hours  atorvastatin 20 milliGRAM(s) Oral at bedtime  buDESOnide    Inhalation Suspension 0.5 milliGRAM(s) Inhalation two times a day  chlorhexidine 4% Liquid 1 Application(s) Topical daily  dextrose 5%. 1000 milliLiter(s) (50 mL/Hr) IV Continuous <Continuous>  dextrose 5%. 1000 milliLiter(s) (100 mL/Hr) IV Continuous <Continuous>  dextrose 50% Injectable 12.5 Gram(s) IV Push once  dextrose 50% Injectable 25 Gram(s) IV Push once  dextrose 50% Injectable 25 Gram(s) IV Push once  ertapenem  IVPB 1000 milliGRAM(s) IV Intermittent every 24 hours  glucagon  Injectable 1 milliGRAM(s) IntraMuscular once  insulin glargine Injectable (LANTUS) 32 Unit(s) SubCutaneous at bedtime  insulin lispro (ADMELOG) corrective regimen sliding scale   SubCutaneous three times a day before meals  insulin lispro (ADMELOG) corrective regimen sliding scale   SubCutaneous at bedtime  insulin lispro Injectable (ADMELOG) 11 Unit(s) SubCutaneous three times a day with meals  methylPREDNISolone sodium succinate Injectable 40 milliGRAM(s) IV Push two times a day  mexiletine 200 milliGRAM(s) Oral every 8 hours  nystatin    Suspension 343963 Unit(s) Swish and Swallow two times a day  polyethylene glycol 3350 17 Gram(s) Oral two times a day  sodium chloride 7% Inhalation 4 milliLiter(s) Inhalation every 6 hours  tiotropium 2.5 MICROgram(s) Inhaler 2 Puff(s) Inhalation daily  trimethoprim  160 mG/sulfamethoxazole 800 mG 1 Tablet(s) Oral daily    MEDICATIONS  (PRN):  acetaminophen     Tablet .. 650 milliGRAM(s) Oral every 6 hours PRN Temp greater or equal to 38C (100.4F), Mild Pain (1 - 3)  aluminum hydroxide/magnesium hydroxide/simethicone Suspension 30 milliLiter(s) Oral every 4 hours PRN Dyspepsia  dextrose Oral Gel 15 Gram(s) Oral once PRN Blood Glucose LESS THAN 70 milliGRAM(s)/deciliter  melatonin 3 milliGRAM(s) Oral at bedtime PRN Insomnia  ondansetron Injectable 4 milliGRAM(s) IV Push every 8 hours PRN Nausea and/or Vomiting      HEALTH ISSUES - PROBLEM Dx:  Acute exacerbation of bronchiectasis    Acute asthma exacerbation    SIRS without infection or organ dysfunction    Atrial fibrillation  paroxysmal, on eliquis    Diabetes mellitus    Prophylactic measure

## 2023-08-16 NOTE — PROGRESS NOTE ADULT - ASSESSMENT
73 yo F with PMHx  asthma on chronic steroids, bronchiectasis, allergic rhinitis,  recurrent PNA, PND, tracheomalacia s/p tracheoplasty, tracheobronchomalacia, ESBL proteus infections (sputum),  diabetes, paroxysmal A-fib on Eliquis, colorectal cancer many years ago status post colostomy, aortic dissection s/p ascending aorta repair presenting with acute on chronic shortness of breath in the setting of recent completion of abx and tapering of her steroids as an outpatient. She reports increased cough/sputum production, worsened dyspnea/wheezing.  RVP (-). Procal 0.13. CTA neg for PE but showing new mucous secretions in BI, mild RLL BE, RML/RLL TIB opacities and some mild central bilateral GGOs.  Had suspected asthma exacerbation/acute bronchitis but patient reports quality of symptoms different from prior exacerbations and unable to pinpoint difference- noted desaturation when ambulating to bathroom but otherwise normal O2Sat at rest. CXR without overt parenchymal abnormalities/infilatrates to suggest evolving PNA. No role for bronchoscopy at this time given treatment for asthma exacerbation, TBM and lack of overt CXR/CT findings - will continue to try to obtain resp culture noninvasively.    - PLEASE check ambulatory pulse ox  - remains afebrile but with persistent leukocytosis  - send sputum cultures for bacterial, fungal and AFB smear/cx (to eval for NTM) -  - one culture sent 8/15/23, PLEASE repeat additional cultures for bacterial/AFB/fungal  - continue hypertonic saline to 7% WITH albuterol neb q6h standing, followed by use of airway clearance device (patient brought her own from home)  - chest PT as tolerated   - cont Budesonide neb should be on 0.5mg q12h   - increase methylprednisolone to 40 mg IV TID and monitor for response  - ID following, recs appreciated, currently on Ertapenem (8/12 -)  - repeat limited echo with doppler for better evaluation of TAVR and perform with bubble  - PT/OT, OOB to chair as much as possible    Radha Desai MD

## 2023-08-16 NOTE — PROGRESS NOTE ADULT - SUBJECTIVE AND OBJECTIVE BOX
Interval History:  Feels somewhat better today, bringing up more sputum with increased hypersal treatments.  Still noting band-like tightness lower chest and SOB when ambulating to bathroom.  Per patient dropped O2Sat when walking to bathroom yesterday but not recorded in MAR.    Allergies: tetanus toxoid (Short breath)  cefepime (Anaphylaxis)  penicillin (Anaphylaxis)  Avelox (Short breath; Pruritus)  Dilaudid (Short breath)  codeine (Short breath)  aspirin (Short breath)  iodine (Short breath; Swelling)  Valium (Short breath)  shellfish (Anaphylaxis)      Objective:  Vitals:   T(F): 97.8 (08-16-23 @ 11:44), Max: 98.2 (08-16-23 @ 05:14)  HR: 80 (08-16-23 @ 11:44) (79 - 83)  BP: 117/62 (08-16-23 @ 11:44) (103/58 - 122/67)  RR: 20 (08-16-23 @ 11:44) (18 - 20)  SpO2: 100% (08-16-23 @ 11:44) (98% - 100%)  Physical Examination:  General: no acute distress  HEENT: NC/AT, anicteric, neck supple  Respiratory: no acc muscle use, breathing comfortably  Cardiovascular: S1 and S2 present  Gastrointestinal: normal appearing, nondistended  Extremities: no edema, no cyanosis  Skin: no visible rash    Laboratory Studies:  CBC:                       8.6    13.87 )-----------( 216      ( 16 Aug 2023 06:30 )             30.5     WBC Trend:  13.87 08-16-23 @ 06:30  20.91 08-15-23 @ 07:04  19.37 08-14-23 @ 07:25  16.73 08-13-23 @ 07:21  9.05 08-12-23 @ 07:17  10.56 08-11-23 @ 16:05    CMP: 08-16    136  |  98  |  22  ----------------------------<  330<H>  4.9   |  25  |  0.73    Ca    9.0      16 Aug 2023 06:31  Phos  4.0     08-16  Mg     2.3     08-16    TPro  5.6<L>  /  Alb  3.4  /  TBili  0.5  /  DBili  x   /  AST  44<H>  /  ALT  25  /  AlkPhos  77  08-16    Creatinine: 0.73 mg/dL (08-16-23 @ 06:31)  Creatinine: 0.71 mg/dL (08-15-23 @ 07:02)  Creatinine: 0.76 mg/dL (08-14-23 @ 07:21)  Creatinine: 0.68 mg/dL (08-13-23 @ 07:22)  Creatinine: 0.67 mg/dL (08-12-23 @ 07:21)  Creatinine: 0.80 mg/dL (08-11-23 @ 16:05)    LIVER FUNCTIONS - ( 16 Aug 2023 06:31 )  Alb: 3.4 g/dL / Pro: 5.6 g/dL / ALK PHOS: 77 U/L / ALT: 25 U/L / AST: 44 U/L / GGT: x           Microbiology: reviewed   Culture - Sputum (collected 08-15-23 @ 12:49)  Source: .Sputum Sputum  Gram Stain (08-15-23 @ 21:07):    Rare Squamous epithelial cells per low power field    Rare polymorphonuclear leukocytes per low power field    Rare Yeast like cells per oil power field    Culture - Acid Fast - Sputum w/Smear (collected 08-14-23 @ 13:11)  Source: .Sputum Sputum    Culture - Blood (collected 08-11-23 @ 16:00)  Source: .Blood Blood-Peripheral  Preliminary Report (08-15-23 @ 20:00):    No growth at 4 days    Culture - Blood (collected 08-11-23 @ 15:15)  Source: .Blood Blood-Peripheral  Preliminary Report (08-15-23 @ 20:00):    No growth at 4 days    SARS-CoV-2 Result: NotDetec (11 Aug 2023 16:06)    Radiology: reviewed     Medications:  acetaminophen     Tablet .. 650 milliGRAM(s) Oral every 6 hours PRN  albuterol/ipratropium for Nebulization 3 milliLiter(s) Nebulizer every 6 hours  aluminum hydroxide/magnesium hydroxide/simethicone Suspension 30 milliLiter(s) Oral every 4 hours PRN  apixaban 5 milliGRAM(s) Oral every 12 hours  atorvastatin 20 milliGRAM(s) Oral at bedtime  buDESOnide    Inhalation Suspension 0.5 milliGRAM(s) Inhalation two times a day  chlorhexidine 4% Liquid 1 Application(s) Topical daily  dextrose 5%. 1000 milliLiter(s) IV Continuous <Continuous>  dextrose 5%. 1000 milliLiter(s) IV Continuous <Continuous>  dextrose 50% Injectable 25 Gram(s) IV Push once  dextrose 50% Injectable 12.5 Gram(s) IV Push once  dextrose 50% Injectable 25 Gram(s) IV Push once  dextrose Oral Gel 15 Gram(s) Oral once PRN  ertapenem  IVPB 1000 milliGRAM(s) IV Intermittent every 24 hours  glucagon  Injectable 1 milliGRAM(s) IntraMuscular once  insulin glargine Injectable (LANTUS) 32 Unit(s) SubCutaneous at bedtime  insulin lispro (ADMELOG) corrective regimen sliding scale   SubCutaneous three times a day before meals  insulin lispro (ADMELOG) corrective regimen sliding scale   SubCutaneous at bedtime  insulin lispro Injectable (ADMELOG) 11 Unit(s) SubCutaneous three times a day with meals  melatonin 3 milliGRAM(s) Oral at bedtime PRN  methylPREDNISolone sodium succinate Injectable 40 milliGRAM(s) IV Push two times a day  mexiletine 200 milliGRAM(s) Oral every 8 hours  nystatin    Suspension 497956 Unit(s) Swish and Swallow two times a day  ondansetron Injectable 4 milliGRAM(s) IV Push every 8 hours PRN  polyethylene glycol 3350 17 Gram(s) Oral two times a day  sodium chloride 7% Inhalation 4 milliLiter(s) Inhalation every 6 hours  tiotropium 2.5 MICROgram(s) Inhaler 2 Puff(s) Inhalation daily  trimethoprim  160 mG/sulfamethoxazole 800 mG 1 Tablet(s) Oral daily    Current Antimicrobials:  ertapenem  IVPB 1000 milliGRAM(s) IV Intermittent every 24 hours  nystatin    Suspension 069324 Unit(s) Swish and Swallow two times a day  trimethoprim  160 mG/sulfamethoxazole 800 mG 1 Tablet(s) Oral daily    Prior/Completed Antimicrobials:  ertapenem  IVPB

## 2023-08-16 NOTE — PROGRESS NOTE ADULT - SUBJECTIVE AND OBJECTIVE BOX
[facility]  GISSELL Uriostegui Y. Patel, S. Shah, G. Casimir  [phone]  ([phone] - weekdays after 5pm and weekends)    Name: RAYRAY RODRIGUEZ  Age/Gender: 72y Female  MRN: [id number]    Interval History:  Patient seen and examined this morning.   Feels better today, no new complaints.  Notes reviewed  No concerning overnight events  Afebrile     Allergies: tetanus toxoid (Short breath)  cefepime (Anaphylaxis)  penicillin (Anaphylaxis)  Avelox (Short breath; Pruritus)  Dilaudid (Short breath)  codeine (Short breath)  aspirin (Short breath)  iodine (Short breath; Swelling)  Valium (Short breath)  shellfish (Anaphylaxis)      Objective:  Vitals:   T(F): 97.8 (08-16-23 @ 11:44), Max: 98.2 (08-16-23 @ 05:14)  HR: 80 (08-16-23 @ 11:44) (79 - 83)  BP: 117/62 (08-16-23 @ 11:44) (103/58 - 122/67)  RR: 20 (08-16-23 @ 11:44) (18 - 20)  SpO2: 100% (08-16-23 @ 11:44) (98% - 100%)  Physical Examination:  General: no acute distress  HEENT: NC/AT, anicteric, neck supple  Respiratory: no acc muscle use, breathing comfortably  Cardiovascular: S1 and S2 present  Gastrointestinal: normal appearing, nondistended  Extremities: no edema, no cyanosis  Skin: no visible rash    Laboratory Studies:  CBC:                       8.6    13.87 )-----------( 216      ( 16 Aug 2023 06:30 )             30.5     WBC Trend:  13.87 08-16-23 @ 06:30  20.91 08-15-23 @ 07:04  19.37 08-14-23 @ 07:25  16.73 08-13-23 @ 07:21  9.05 08-12-23 @ 07:17  10.56 08-11-23 @ 16:05    CMP: 08-16    136  |  98  |  22  ----------------------------<  330<H>  4.9   |  25  |  0.73    Ca    9.0      16 Aug 2023 06:31  Phos  4.0     08-16  Mg     2.3     08-16    TPro  5.6<L>  /  Alb  3.4  /  TBili  0.5  /  DBili  x   /  AST  44<H>  /  ALT  25  /  AlkPhos  77  08-16    Creatinine: 0.73 mg/dL (08-16-23 @ 06:31)  Creatinine: 0.71 mg/dL (08-15-23 @ 07:02)  Creatinine: 0.76 mg/dL (08-14-23 @ 07:21)  Creatinine: 0.68 mg/dL (08-13-23 @ 07:22)  Creatinine: 0.67 mg/dL (08-12-23 @ 07:21)  Creatinine: 0.80 mg/dL (08-11-23 @ 16:05)    LIVER FUNCTIONS - ( 16 Aug 2023 06:31 )  Alb: 3.4 g/dL / Pro: 5.6 g/dL / ALK PHOS: 77 U/L / ALT: 25 U/L / AST: 44 U/L / GGT: x           Microbiology: reviewed   Culture - Sputum (collected 08-15-23 @ 12:49)  Source: .Sputum Sputum  Gram Stain (08-15-23 @ 21:07):    Rare Squamous epithelial cells per low power field    Rare polymorphonuclear leukocytes per low power field    Rare Yeast like cells per oil power field    Culture - Acid Fast - Sputum w/Smear (collected 08-14-23 @ 13:11)  Source: .Sputum Sputum    Culture - Blood (collected 08-11-23 @ 16:00)  Source: .Blood Blood-Peripheral  Preliminary Report (08-15-23 @ 20:00):    No growth at 4 days    Culture - Blood (collected 08-11-23 @ 15:15)  Source: .Blood Blood-Peripheral  Preliminary Report (08-15-23 @ 20:00):    No growth at 4 days    SARS-CoV-2 Result: NotDetec (11 Aug 2023 16:06)    Radiology: reviewed     Medications:  acetaminophen     Tablet .. 650 milliGRAM(s) Oral every 6 hours PRN  albuterol/ipratropium for Nebulization 3 milliLiter(s) Nebulizer every 6 hours  aluminum hydroxide/magnesium hydroxide/simethicone Suspension 30 milliLiter(s) Oral every 4 hours PRN  apixaban 5 milliGRAM(s) Oral every 12 hours  atorvastatin 20 milliGRAM(s) Oral at bedtime  buDESOnide    Inhalation Suspension 0.5 milliGRAM(s) Inhalation two times a day  chlorhexidine 4% Liquid 1 Application(s) Topical daily  dextrose 5%. 1000 milliLiter(s) IV Continuous <Continuous>  dextrose 5%. 1000 milliLiter(s) IV Continuous <Continuous>  dextrose 50% Injectable 25 Gram(s) IV Push once  dextrose 50% Injectable 12.5 Gram(s) IV Push once  dextrose 50% Injectable 25 Gram(s) IV Push once  dextrose Oral Gel 15 Gram(s) Oral once PRN  ertapenem  IVPB 1000 milliGRAM(s) IV Intermittent every 24 hours  glucagon  Injectable 1 milliGRAM(s) IntraMuscular once  insulin glargine Injectable (LANTUS) 32 Unit(s) SubCutaneous at bedtime  insulin lispro (ADMELOG) corrective regimen sliding scale   SubCutaneous three times a day before meals  insulin lispro (ADMELOG) corrective regimen sliding scale   SubCutaneous at bedtime  insulin lispro Injectable (ADMELOG) 11 Unit(s) SubCutaneous three times a day with meals  melatonin 3 milliGRAM(s) Oral at bedtime PRN  methylPREDNISolone sodium succinate Injectable 40 milliGRAM(s) IV Push two times a day  mexiletine 200 milliGRAM(s) Oral every 8 hours  nystatin    Suspension 973508 Unit(s) Swish and Swallow two times a day  ondansetron Injectable 4 milliGRAM(s) IV Push every 8 hours PRN  polyethylene glycol 3350 17 Gram(s) Oral two times a day  sodium chloride 7% Inhalation 4 milliLiter(s) Inhalation every 6 hours  tiotropium 2.5 MICROgram(s) Inhaler 2 Puff(s) Inhalation daily  trimethoprim  160 mG/sulfamethoxazole 800 mG 1 Tablet(s) Oral daily    Current Antimicrobials:  ertapenem  IVPB 1000 milliGRAM(s) IV Intermittent every 24 hours  nystatin    Suspension 248380 Unit(s) Swish and Swallow two times a day  trimethoprim  160 mG/sulfamethoxazole 800 mG 1 Tablet(s) Oral daily    Prior/Completed Antimicrobials:  ertapenem  IVPB

## 2023-08-16 NOTE — PROGRESS NOTE ADULT - PROBLEM SELECTOR PLAN 2
-meet SIRS  -blood cultures 8/11 NGTD  -sputum gram stain noncontributory, acid fast stain negative  -continue to follow sputum cultures  - ID consulted, started ertapenem 8/12 empirically given history of ESBL proteus infection   -IVF as needed

## 2023-08-16 NOTE — CHART NOTE - NSCHARTNOTEFT_GEN_A_CORE
Chart reviewed.    71 yo PMH of asthma on chronic steroids, bronchiectasis s/p surgery, allergic rhinitis, recurrent PNA, tracheomalacia s/p tracheoplasty, Type 2 DM, paroxysmal Afib, colorectal cancer many years ago status post colostomy presenting with acute on chronic shortness of breath in the setting of recent completion of abx and tapering of her steroids as an outpatient. Currently being treated with high dose steroids for asthma/bronchiectasis exacerbation. On methylprednisolone 40mg IV BID since 8/11-.    Home meds: Basaglar 25-30 units QHS, Humalog 7-20 units TIDAC    CAPILLARY BLOOD GLUCOSE  POCT Blood Glucose.: 335 mg/dL (16 Aug 2023 12:55)  POCT Blood Glucose.: 307 mg/dL (16 Aug 2023 08:45)  POCT Blood Glucose.: 117 mg/dL (15 Aug 2023 21:11)  POCT Blood Glucose.: 95 mg/dL (15 Aug 2023 17:41)    No evidence of DKA on labs.  A1c 9.1%.      Preliminary recommendations:  - Recommend CC diet WITHOUT evening snack  - Lantus 34 units QHS  - Admelog 16 units TIDAC  - Continue moderate dose scale TIDAC, QHS    Full consult tomorrow.    Armaan Lynne MD  Endocrine Fellow  Can be reached via teams. For follow up questions, discharge recommendations, or new consults, please call answering service at [phone] (weekdays); [phone] (nights/weekends). Chart reviewed.    71 yo PMH of asthma on chronic steroids, bronchiectasis s/p surgery, allergic rhinitis, recurrent PNA, tracheomalacia s/p tracheoplasty, Type 2 DM, paroxysmal Afib, colorectal cancer many years ago status post colostomy presenting with acute on chronic shortness of breath in the setting of recent completion of abx and tapering of her steroids as an outpatient. Currently being treated with high dose steroids for asthma/bronchiectasis exacerbation. On methylprednisolone 40mg IV BID since 8/11-.    Consulted for steroid induced hyperglycemia in T2DM patient.    Home meds: Basaglar 25-30 units QHS, Humalog 7-20 units TIDAC    CAPILLARY BLOOD GLUCOSE  POCT Blood Glucose.: 335 mg/dL (16 Aug 2023 12:55)  POCT Blood Glucose.: 307 mg/dL (16 Aug 2023 08:45)  POCT Blood Glucose.: 117 mg/dL (15 Aug 2023 21:11)  POCT Blood Glucose.: 95 mg/dL (15 Aug 2023 17:41)    No evidence of DKA on labs.  A1c 9.1%.      Preliminary recommendations:  - Recommend CC diet WITHOUT evening snack  - Lantus 34 units QHS  - Admelog 16 units TIDAC  - Continue moderate dose scale TIDAC, QHS    Full consult tomorrow.    Armaan Lynne MD  Endocrine Fellow  Can be reached via teams. For follow up questions, discharge recommendations, or new consults, please call answering service at [phone] (weekdays); [phone] (nights/weekends).

## 2023-08-16 NOTE — PROGRESS NOTE ADULT - ASSESSMENT
71 yo PMHx  asthma on chronic steroids, bronchiectasis s/p surgery, allergic rhinitis,  recurrent PNA, PND, tracheomalacia s/p tracheoplasty, ESBL proteus infections (sputum),  diabetes, paroxysmal A-fib on Eliquis, colorectal cancer many years ago status post colostomy presenting with concern for shortness of breath. Prior issues with ESBL/Proteus/yeast in sputum culture and was treated with ertapenem/Benadryl/vancomycin/aztreonam and methylprednisolone.   She was discharged home with a midline and completed a course of antibiotics ertapenem (last dose 06/25) PT now readmitted with severe dyspnea and reports that having had asthma for 61 years she feels like she needs an antibiotic. Reports not tolerating meropenem. Patient follows with Dr Allison who knows this complicated pt very well    RECOMMENDATIONS  -no evidence of airspace disease so more of a bronchiectasis exacerbation  -recent sputums with ESBL proteus so having tolerated ertapenem will restart  -leukocytosis improved - suspect may be reactive in setting of steroids    - no new complaints or focal findings on exam; remains afebrile, feels better today   -Repeat CXR with no pneumonia   Pulmonary following  Follow up cultures (gram stain noted), AFB smear negative   Continue ertapenem (8/12--)   Supportive care, chest PT, neb treatments   Monitor temps/CBC      Tello Fernandez M.D.  [facility], Division of Infectious Diseases  [phone]  After 5pm on weekdays and all day on weekends - please call [phone]

## 2023-08-16 NOTE — PROGRESS NOTE ADULT - ATTENDING COMMENTS
This is a 72F with h/o bronchiectasis on chronic steroid, s/p surgery, allergic rhinitis,  recurrent PNA, PND, tracheomalacia s/p tracheoplasty, ESBL proteus infections (sputum), T2DM, paroxysmal A-fib on Eliquis, colorectal cancer many years ago status post colostomy presenting with shortness of breath for 3 days. Prior issues with ESBL/Proteus/yeast in sputum culture and was treated with ertapenem/Benadryl/vancomycin/aztreonam and methylprednisolone. She was discharged home with a midline and completed a course of antibiotics ertapenem (last dose 06/25). Recently she was treated for MRSA and pseudomonas in sputum and completed Tobra nebs and doxycycline.    1. Acute on chronic hypoxic RF due to exacerbation of bronchiectasis (on chronic steroid)  2. Recent tracheo-bronchitis due to ESBL proteus/MRSA/pseudomonas  3. Paroxysmal A-fib on Eliquis  4. H/o colorectal cancer, status post colostomy    - Feels better, less cough, c/o SOB on exertion, no chest pain, remains afebrile. WBC 13, O2 sat 99% RA in rest & on ambulation  - ID and Pulmonary plans noted- on bronchodilators, IV/inhaled steroids, IV Ertapenem. Awaiting on sputum c/s.   - Hypertonic saline to 7% with bronchodilators followed by use of airway clearance device (patient brought her own from home) and chest PT as tolerated   - Elevated WBC is likely from IV steroid. CT chest shows unchanged infectious/inflammatory small airways disease in the right middle/lower lobes with multifocal small tree-in-bud nodules. Status post ascending aortic and aortic valve replacement, with residual dissection flap better seen on the prior study. Stable indeterminate small left renal nodule and numerous pancreatic cysts.  - LDH 1300, Fungitell sent  - PT, O2 sat on ambulation  - Echo bubble study per Pulmonary   - c/w Eliquis, Lantus/Admelog at current doses, FS elevated due to IV steroid, Endo consult  - Her outpatient Pul- Dr Allison is aware.

## 2023-08-16 NOTE — PROGRESS NOTE ADULT - PROBLEM SELECTOR PLAN 1
- CT chest showed increased bronchial secretions, on RA saturating well, speaking in full sentences, unclear trigger, likely due to infection, or inflammation.     - Sputum culture pending  - urine legionella, strep neg  - RVP negative  - hypertonic saline 7% and albuterol neb q6h standing, followed by use of airway clearance device (patient brought her own from home)  - chest PT as tolerated   - budesonide 0.5mg neb q12h standing   - c/w methylprednisolone to 40 mg IV bid for now, with taper to 40 mg IV daily once improving  - pulm following, appreciate recs - CT chest showed increased bronchial secretions, on RA saturating well, speaking in full sentences, unclear trigger, likely due to infection, or inflammation.     - Sputum culture pending  - urine legionella, strep neg  - RVP negative  - hypertonic saline 7% and albuterol neb q6h standing, followed by use of airway clearance device (patient brought her own from home)  - chest PT as tolerated   - budesonide 0.5mg neb q12h standing   - c/w methylprednisolone to 40 mg IV bid for now, with taper to 40 mg IV daily once improving  - pulm following, appreciate recs  - echo: hyperdynamic LV, likely normal RV function

## 2023-08-17 DIAGNOSIS — E78.5 HYPERLIPIDEMIA, UNSPECIFIED: ICD-10-CM

## 2023-08-17 DIAGNOSIS — I10 ESSENTIAL (PRIMARY) HYPERTENSION: ICD-10-CM

## 2023-08-17 DIAGNOSIS — E11.65 TYPE 2 DIABETES MELLITUS WITH HYPERGLYCEMIA: ICD-10-CM

## 2023-08-17 LAB
ALBUMIN SERPL ELPH-MCNC: 3.3 G/DL — SIGNIFICANT CHANGE UP (ref 3.3–5)
ALP SERPL-CCNC: 72 U/L — SIGNIFICANT CHANGE UP (ref 40–120)
ALT FLD-CCNC: 23 U/L — SIGNIFICANT CHANGE UP (ref 10–45)
ANION GAP SERPL CALC-SCNC: 11 MMOL/L — SIGNIFICANT CHANGE UP (ref 5–17)
AST SERPL-CCNC: 35 U/L — SIGNIFICANT CHANGE UP (ref 10–40)
BASOPHILS # BLD AUTO: 0.02 K/UL — SIGNIFICANT CHANGE UP (ref 0–0.2)
BASOPHILS NFR BLD AUTO: 0.1 % — SIGNIFICANT CHANGE UP (ref 0–2)
BILIRUB SERPL-MCNC: 0.5 MG/DL — SIGNIFICANT CHANGE UP (ref 0.2–1.2)
BUN SERPL-MCNC: 21 MG/DL — SIGNIFICANT CHANGE UP (ref 7–23)
CALCIUM SERPL-MCNC: 8.5 MG/DL — SIGNIFICANT CHANGE UP (ref 8.4–10.5)
CHLORIDE SERPL-SCNC: 102 MMOL/L — SIGNIFICANT CHANGE UP (ref 96–108)
CO2 SERPL-SCNC: 25 MMOL/L — SIGNIFICANT CHANGE UP (ref 22–31)
CREAT SERPL-MCNC: 0.72 MG/DL — SIGNIFICANT CHANGE UP (ref 0.5–1.3)
CULTURE RESULTS: SIGNIFICANT CHANGE UP
EGFR: 88 ML/MIN/1.73M2 — SIGNIFICANT CHANGE UP
EOSINOPHIL # BLD AUTO: 0 K/UL — SIGNIFICANT CHANGE UP (ref 0–0.5)
EOSINOPHIL NFR BLD AUTO: 0 % — SIGNIFICANT CHANGE UP (ref 0–6)
GLUCOSE BLDC GLUCOMTR-MCNC: 203 MG/DL — HIGH (ref 70–99)
GLUCOSE BLDC GLUCOMTR-MCNC: 288 MG/DL — HIGH (ref 70–99)
GLUCOSE BLDC GLUCOMTR-MCNC: 309 MG/DL — HIGH (ref 70–99)
GLUCOSE BLDC GLUCOMTR-MCNC: 319 MG/DL — HIGH (ref 70–99)
GLUCOSE BLDC GLUCOMTR-MCNC: 339 MG/DL — HIGH (ref 70–99)
GLUCOSE SERPL-MCNC: 300 MG/DL — HIGH (ref 70–99)
GRAM STN FLD: SIGNIFICANT CHANGE UP
HCT VFR BLD CALC: 28.5 % — LOW (ref 34.5–45)
HGB BLD-MCNC: 8.1 G/DL — LOW (ref 11.5–15.5)
IMM GRANULOCYTES NFR BLD AUTO: 1.9 % — HIGH (ref 0–0.9)
LYMPHOCYTES # BLD AUTO: 1.05 K/UL — SIGNIFICANT CHANGE UP (ref 1–3.3)
LYMPHOCYTES # BLD AUTO: 6.7 % — LOW (ref 13–44)
MAGNESIUM SERPL-MCNC: 2.3 MG/DL — SIGNIFICANT CHANGE UP (ref 1.6–2.6)
MCHC RBC-ENTMCNC: 22.8 PG — LOW (ref 27–34)
MCHC RBC-ENTMCNC: 28.4 GM/DL — LOW (ref 32–36)
MCV RBC AUTO: 80.3 FL — SIGNIFICANT CHANGE UP (ref 80–100)
MONOCYTES # BLD AUTO: 1.08 K/UL — HIGH (ref 0–0.9)
MONOCYTES NFR BLD AUTO: 6.9 % — SIGNIFICANT CHANGE UP (ref 2–14)
NEUTROPHILS # BLD AUTO: 13.15 K/UL — HIGH (ref 1.8–7.4)
NEUTROPHILS NFR BLD AUTO: 84.4 % — HIGH (ref 43–77)
NRBC # BLD: 6 /100 WBCS — HIGH (ref 0–0)
PHOSPHATE SERPL-MCNC: 3.9 MG/DL — SIGNIFICANT CHANGE UP (ref 2.5–4.5)
PLATELET # BLD AUTO: 215 K/UL — SIGNIFICANT CHANGE UP (ref 150–400)
POTASSIUM SERPL-MCNC: 5.1 MMOL/L — SIGNIFICANT CHANGE UP (ref 3.5–5.3)
POTASSIUM SERPL-SCNC: 5.1 MMOL/L — SIGNIFICANT CHANGE UP (ref 3.5–5.3)
PROT SERPL-MCNC: 5.1 G/DL — LOW (ref 6–8.3)
RBC # BLD: 3.55 M/UL — LOW (ref 3.8–5.2)
RBC # FLD: 27.5 % — HIGH (ref 10.3–14.5)
SODIUM SERPL-SCNC: 138 MMOL/L — SIGNIFICANT CHANGE UP (ref 135–145)
SPECIMEN SOURCE: SIGNIFICANT CHANGE UP
SPECIMEN SOURCE: SIGNIFICANT CHANGE UP
WBC # BLD: 15.59 K/UL — HIGH (ref 3.8–10.5)
WBC # FLD AUTO: 15.59 K/UL — HIGH (ref 3.8–10.5)

## 2023-08-17 PROCEDURE — 99233 SBSQ HOSP IP/OBS HIGH 50: CPT

## 2023-08-17 PROCEDURE — 99223 1ST HOSP IP/OBS HIGH 75: CPT

## 2023-08-17 RX ORDER — INSULIN GLARGINE 100 [IU]/ML
48 INJECTION, SOLUTION SUBCUTANEOUS AT BEDTIME
Refills: 0 | Status: DISCONTINUED | OUTPATIENT
Start: 2023-08-17 | End: 2023-08-18

## 2023-08-17 RX ORDER — SENNA PLUS 8.6 MG/1
2 TABLET ORAL AT BEDTIME
Refills: 0 | Status: DISCONTINUED | OUTPATIENT
Start: 2023-08-17 | End: 2023-09-06

## 2023-08-17 RX ORDER — DIPHENHYDRAMINE HCL 50 MG
50 CAPSULE ORAL ONCE
Refills: 0 | Status: COMPLETED | OUTPATIENT
Start: 2023-08-17 | End: 2023-08-17

## 2023-08-17 RX ADMIN — Medication 3 MILLILITER(S): at 00:07

## 2023-08-17 RX ADMIN — Medication 8: at 08:47

## 2023-08-17 RX ADMIN — Medication 16 UNIT(S): at 12:27

## 2023-08-17 RX ADMIN — SENNA PLUS 2 TABLET(S): 8.6 TABLET ORAL at 21:56

## 2023-08-17 RX ADMIN — APIXABAN 5 MILLIGRAM(S): 2.5 TABLET, FILM COATED ORAL at 06:16

## 2023-08-17 RX ADMIN — ONDANSETRON 4 MILLIGRAM(S): 8 TABLET, FILM COATED ORAL at 23:14

## 2023-08-17 RX ADMIN — Medication 500000 UNIT(S): at 18:01

## 2023-08-17 RX ADMIN — MEXILETINE HYDROCHLORIDE 200 MILLIGRAM(S): 150 CAPSULE ORAL at 14:48

## 2023-08-17 RX ADMIN — POLYETHYLENE GLYCOL 3350 17 GRAM(S): 17 POWDER, FOR SOLUTION ORAL at 17:53

## 2023-08-17 RX ADMIN — TIOTROPIUM BROMIDE 2 PUFF(S): 18 CAPSULE ORAL; RESPIRATORY (INHALATION) at 12:26

## 2023-08-17 RX ADMIN — Medication 8: at 17:56

## 2023-08-17 RX ADMIN — SODIUM CHLORIDE 4 MILLILITER(S): 9 INJECTION INTRAMUSCULAR; INTRAVENOUS; SUBCUTANEOUS at 00:52

## 2023-08-17 RX ADMIN — Medication 0.5 MILLIGRAM(S): at 18:11

## 2023-08-17 RX ADMIN — SODIUM CHLORIDE 4 MILLILITER(S): 9 INJECTION INTRAMUSCULAR; INTRAVENOUS; SUBCUTANEOUS at 06:51

## 2023-08-17 RX ADMIN — Medication 40 MILLIGRAM(S): at 06:17

## 2023-08-17 RX ADMIN — Medication 500000 UNIT(S): at 06:17

## 2023-08-17 RX ADMIN — INSULIN GLARGINE 48 UNIT(S): 100 INJECTION, SOLUTION SUBCUTANEOUS at 21:57

## 2023-08-17 RX ADMIN — APIXABAN 5 MILLIGRAM(S): 2.5 TABLET, FILM COATED ORAL at 17:53

## 2023-08-17 RX ADMIN — Medication 0.5 MILLIGRAM(S): at 06:18

## 2023-08-17 RX ADMIN — SODIUM CHLORIDE 4 MILLILITER(S): 9 INJECTION INTRAMUSCULAR; INTRAVENOUS; SUBCUTANEOUS at 12:28

## 2023-08-17 RX ADMIN — Medication 3 MILLILITER(S): at 06:16

## 2023-08-17 RX ADMIN — Medication 3 MILLILITER(S): at 17:54

## 2023-08-17 RX ADMIN — Medication 16 UNIT(S): at 17:57

## 2023-08-17 RX ADMIN — MEXILETINE HYDROCHLORIDE 200 MILLIGRAM(S): 150 CAPSULE ORAL at 06:18

## 2023-08-17 RX ADMIN — Medication 8: at 12:27

## 2023-08-17 RX ADMIN — Medication 3 MILLILITER(S): at 23:10

## 2023-08-17 RX ADMIN — SODIUM CHLORIDE 4 MILLILITER(S): 9 INJECTION INTRAMUSCULAR; INTRAVENOUS; SUBCUTANEOUS at 23:10

## 2023-08-17 RX ADMIN — ATORVASTATIN CALCIUM 20 MILLIGRAM(S): 80 TABLET, FILM COATED ORAL at 21:56

## 2023-08-17 RX ADMIN — Medication 1 TABLET(S): at 12:29

## 2023-08-17 RX ADMIN — MEXILETINE HYDROCHLORIDE 200 MILLIGRAM(S): 150 CAPSULE ORAL at 21:56

## 2023-08-17 RX ADMIN — SODIUM CHLORIDE 4 MILLILITER(S): 9 INJECTION INTRAMUSCULAR; INTRAVENOUS; SUBCUTANEOUS at 17:53

## 2023-08-17 RX ADMIN — Medication 30 MILLILITER(S): at 22:06

## 2023-08-17 RX ADMIN — ERTAPENEM SODIUM 120 MILLIGRAM(S): 1 INJECTION, POWDER, LYOPHILIZED, FOR SOLUTION INTRAMUSCULAR; INTRAVENOUS at 19:35

## 2023-08-17 RX ADMIN — Medication 3 MILLILITER(S): at 12:26

## 2023-08-17 RX ADMIN — CHLORHEXIDINE GLUCONATE 1 APPLICATION(S): 213 SOLUTION TOPICAL at 12:30

## 2023-08-17 RX ADMIN — Medication 16 UNIT(S): at 08:48

## 2023-08-17 RX ADMIN — Medication 50 MILLIGRAM(S): at 18:32

## 2023-08-17 RX ADMIN — POLYETHYLENE GLYCOL 3350 17 GRAM(S): 17 POWDER, FOR SOLUTION ORAL at 06:17

## 2023-08-17 NOTE — PROGRESS NOTE ADULT - ATTENDING COMMENTS
This is a 72F with h/o bronchiectasis on chronic steroid, s/p surgery, allergic rhinitis,  recurrent PNA, PND, tracheomalacia s/p tracheoplasty, ESBL proteus infections (sputum), T2DM, paroxysmal A-fib on Eliquis, colorectal cancer many years ago status post colostomy presenting with shortness of breath for 3 days. Prior issues with ESBL/Proteus/yeast in sputum culture and was treated with ertapenem/Benadryl/vancomycin/aztreonam and methylprednisolone. She was discharged home with a midline and completed a course of antibiotics ertapenem (last dose 06/25). Recently she was treated for MRSA and pseudomonas in sputum and completed Tobra nebs and doxycycline.    1. Acute on chronic hypoxic RF due to exacerbation of bronchiectasis (on chronic steroid)  2. Recent tracheo-bronchitis due to ESBL proteus/MRSA/pseudomonas  3. Paroxysmal A-fib on Eliquis  4. H/o colorectal cancer, status post colostomy    - afebrile, c/o SOB on exertion, no chest pain, WBC 15, O2 sat 99% RA in rest & on ambulation  - ID and Pulmonary plans noted- on bronchodilators, IV/inhaled steroids, IV Ertapenem. sputum c/s neg 8/15.   - Hypertonic saline to 7% with bronchodilators followed by use of airway clearance device (patient brought her own from home) and chest PT as tolerated   - Elevated WBC is likely from IV steroid. CT chest shows unchanged infectious/inflammatory small airways disease in the right middle/lower lobes with multifocal small tree-in-bud nodules. Status post ascending aortic and aortic valve replacement, with residual dissection flap better seen on the prior study. Stable indeterminate small left renal nodule and numerous pancreatic cysts.  - LDH 1382, Fungitell sent  - Echo bubble study no shunt  - c/w Eliquis,   - Endocrine f/u plans noted- on Lantus 48u qhs and Admelog 16u AC and SS, FS elevated due to IV steroid  - Her outpatient Pul- Dr Allison is aware.

## 2023-08-17 NOTE — PROGRESS NOTE ADULT - SUBJECTIVE AND OBJECTIVE BOX
Lázaro Molina, PGY-1  Please contact on Teams    MICHAEL RAYRAY  72y  Female      SUBJECTIVE/INTERVAL EVENTS: No acute events. Pt seen and examined at bedside.    PHYSICAL EXAM:  GENERAL: NAD, lying comfortably in bed   HEAD:  Atraumatic, Normocephalic  EYES: EOMI b/l, PERRLA b/l, conjunctiva and sclera clear  NECK: Supple, No JVD, No LAD   CHEST/LUNG: coarse breath sounds, similar to prior  HEART: Regular rate and rhythm; S1 and S2 present  ABDOMEN: Soft, Nontender, Nondistended; Bowel sounds present  EXTREMITIES:  No clubbing, cyanosis, or edema  NEURO: AAOx3, non-focal   SKIN: No rashes or lesions      Vital Signs Last 24 Hrs  T(C): 36.8 (17 Aug 2023 06:00), Max: 37.6 (16 Aug 2023 20:48)  T(F): 98.3 (17 Aug 2023 06:00), Max: 99.6 (16 Aug 2023 20:48)  HR: 73 (17 Aug 2023 06:00) (73 - 85)  BP: 103/56 (17 Aug 2023 06:00) (103/56 - 154/84)  BP(mean): --  RR: 20 (17 Aug 2023 06:00) (18 - 24)  SpO2: 96% (17 Aug 2023 06:00) (96% - 100%)    Parameters below as of 17 Aug 2023 06:00  Patient On (Oxygen Delivery Method): room air        Consultant(s) Notes Reviewed:  [x] YES  [ ] NO  Care Discussed with Consultants/Other Providers [x] YES  [ ] NO    LABS:                        8.1    15.59 )-----------( 215      ( 17 Aug 2023 06:32 )             28.5                         8.6    13.87 )-----------( 216      ( 16 Aug 2023 06:30 )             30.5                         8.5    20.91 )-----------( 210      ( 15 Aug 2023 07:04 )             29.8                               138    |  102    |  21                  Calcium: 8.5   / iCa: x      (08-17 @ 06:32)    ----------------------------<  300       Magnesium: 2.3                              5.1     |  25     |  0.72             Phosphorous: 3.9      TPro  5.1    /  Alb  3.3    /  TBili  0.5    /  DBili  x      /  AST  35     /  ALT  23     /  AlkPhos  72     17 Aug 2023 06:32    Urinalysis Basic - ( 17 Aug 2023 06:32 )    Color: x / Appearance: x / SG: x / pH: x  Gluc: 300 mg/dL / Ketone: x  / Bili: x / Urobili: x   Blood: x / Protein: x / Nitrite: x   Leuk Esterase: x / RBC: x / WBC x   Sq Epi: x / Non Sq Epi: x / Bacteria: x        RADIOLOGY & ADDITIONAL TESTS:    Imaging Personally Reviewed:  [x] YES  [ ] NO    MEDICATIONS  (STANDING):  albuterol/ipratropium for Nebulization 3 milliLiter(s) Nebulizer every 6 hours  apixaban 5 milliGRAM(s) Oral every 12 hours  atorvastatin 20 milliGRAM(s) Oral at bedtime  buDESOnide    Inhalation Suspension 0.5 milliGRAM(s) Inhalation two times a day  chlorhexidine 4% Liquid 1 Application(s) Topical daily  dextrose 5%. 1000 milliLiter(s) (50 mL/Hr) IV Continuous <Continuous>  dextrose 5%. 1000 milliLiter(s) (100 mL/Hr) IV Continuous <Continuous>  dextrose 50% Injectable 25 Gram(s) IV Push once  dextrose 50% Injectable 12.5 Gram(s) IV Push once  dextrose 50% Injectable 25 Gram(s) IV Push once  ertapenem  IVPB 1000 milliGRAM(s) IV Intermittent every 24 hours  glucagon  Injectable 1 milliGRAM(s) IntraMuscular once  insulin glargine Injectable (LANTUS) 34 Unit(s) SubCutaneous at bedtime  insulin lispro (ADMELOG) corrective regimen sliding scale   SubCutaneous three times a day before meals  insulin lispro (ADMELOG) corrective regimen sliding scale   SubCutaneous at bedtime  insulin lispro Injectable (ADMELOG) 16 Unit(s) SubCutaneous three times a day with meals  methylPREDNISolone sodium succinate Injectable 40 milliGRAM(s) IV Push two times a day  mexiletine 200 milliGRAM(s) Oral every 8 hours  nystatin    Suspension 498706 Unit(s) Swish and Swallow two times a day  polyethylene glycol 3350 17 Gram(s) Oral two times a day  sodium chloride 7% Inhalation 4 milliLiter(s) Inhalation every 6 hours  tiotropium 2.5 MICROgram(s) Inhaler 2 Puff(s) Inhalation daily  trimethoprim  160 mG/sulfamethoxazole 800 mG 1 Tablet(s) Oral daily    MEDICATIONS  (PRN):  acetaminophen     Tablet .. 650 milliGRAM(s) Oral every 6 hours PRN Temp greater or equal to 38C (100.4F), Mild Pain (1 - 3)  aluminum hydroxide/magnesium hydroxide/simethicone Suspension 30 milliLiter(s) Oral every 4 hours PRN Dyspepsia  dextrose Oral Gel 15 Gram(s) Oral once PRN Blood Glucose LESS THAN 70 milliGRAM(s)/deciliter  diphenhydrAMINE 25 milliGRAM(s) Oral daily PRN Allergy symptoms  melatonin 3 milliGRAM(s) Oral at bedtime PRN Insomnia  ondansetron Injectable 4 milliGRAM(s) IV Push every 8 hours PRN Nausea and/or Vomiting      HEALTH ISSUES - PROBLEM Dx:  Acute exacerbation of bronchiectasis    Acute asthma exacerbation    SIRS without infection or organ dysfunction    Atrial fibrillation  paroxysmal, on eliquis    Diabetes mellitus    Prophylactic measure

## 2023-08-17 NOTE — PROGRESS NOTE ADULT - PROBLEM SELECTOR PLAN 4
-on 25-30 lantus at home, and 7-20 mealtime as well  -ISS moderate risk   -endo consult, 34 basal, 16 premeal

## 2023-08-17 NOTE — PROGRESS NOTE ADULT - NS ATTEND BILL GEN_ALL_CORE
Render In Strict Bullet Format?: No
Detail Level: Zone
Attending to bill
Initiate Treatment: Ketoconazole shampoo 2-3 times weekly
Attending to bill

## 2023-08-17 NOTE — PROGRESS NOTE ADULT - ASSESSMENT
73 yo PMHx  asthma on chronic steroids, bronchiectasis s/p surgery, allergic rhinitis,  recurrent PNA, PND, tracheomalacia s/p tracheoplasty, ESBL proteus infections (sputum),  diabetes, paroxysmal A-fib on Eliquis, colorectal cancer many years ago status post colostomy presenting with concern for shortness of breath. Prior issues with ESBL/Proteus/yeast in sputum culture and was treated with ertapenem/Benadryl/vancomycin/aztreonam and methylprednisolone.   She was discharged home with a midline and completed a course of antibiotics ertapenem (last dose 06/25) PT now readmitted with severe dyspnea and reports that having had asthma for 61 years she feels like she needs an antibiotic. Reports not tolerating meropenem. Patient follows with Dr Allison who knows this complicated pt very well    RECOMMENDATIONS  -no evidence of airspace disease so more of a bronchiectasis exacerbation  -recent sputums with ESBL proteus so having tolerated ertapenem will restart  -leukocytosis trend noted - suspect may be reactive in setting of steroids    - no new complaints or focal findings on exam; remains afebrile, feels better today   -Repeat CXR with no pneumonia   -8/15 Sputum culture with normal resp val   -8/14 Sputum AFB smear negative     Pulmonary following  Follow up repeat sputum culture - gram stain noted  Follow AFB sputum culture   Continue ertapenem (8/12--)  - can complete total 7d course tomorrow 8/18  Supportive care, chest PT, neb treatments   Monitor temps/CBC      Tello Fernandez M.D.  [facility], Division of Infectious Diseases  [phone]  After 5pm on weekdays and all day on weekends - please call [phone]

## 2023-08-17 NOTE — CONSULT NOTE ADULT - PROBLEM SELECTOR RECOMMENDATION 9
Diabetes Education and Nutrition Eval  Increase Lantus to 48 units qhs  Monitor on Admelog 16 units qac  Mod correction scale qac + bedtime  Goal glucose 100-180  Outpt. endo follow-up  Outpt. optho, podiatry, micro/cr  Plan to d/c on basal bolus Diabetes Education and Nutrition Eval  Increase Lantus to 48 units qhs  Monitor on Admelog 16 units qac for now  Mod correction scale qac + bedtime  Goal glucose 100-180  Outpt. endo follow-up  Outpt. optho, podiatry, micro/cr  Please notify us about any changes to steroid dosing as insulin will need to be adjusted accordingly  Plan to d/c on basal bolus

## 2023-08-17 NOTE — PROGRESS NOTE ADULT - SUBJECTIVE AND OBJECTIVE BOX
[facility] DIVISION OF INFECTIOUS DISEASES  GISSELL Uriostegui Y. Patel, S. Shah, G. Terrell  [phone]  ([phone] - weekdays after 5pm and weekends)    Name: RAYRAY RODRIGUEZ  Age/Gender: 72y Female  MRN: [id number]    Interval History:  Patient seen and examined this morning.   States she continues to feel better  No new complaints noted.  Notes reviewed  No concerning overnight events  Afebrile     Allergies: tetanus toxoid (Short breath)  cefepime (Anaphylaxis)  penicillin (Anaphylaxis)  Avelox (Short breath; Pruritus)  Dilaudid (Short breath)  codeine (Short breath)  aspirin (Short breath)  iodine (Short breath; Swelling)  Valium (Short breath)  shellfish (Anaphylaxis)      Objective:  Vitals:   T(F): 98.1 (08-17-23 @ 13:17), Max: 99.6 (08-16-23 @ 20:48)  HR: 69 (08-17-23 @ 13:17) (68 - 85)  BP: 118/79 (08-17-23 @ 13:17) (103/56 - 154/84)  RR: 20 (08-17-23 @ 13:17) (18 - 24)  SpO2: 96% (08-17-23 @ 13:17) (96% - 100%)  Physical Examination:  General: no acute distress, RA  HEENT: NC/AT, anicteric, neck supple  Respiratory: no acc muscle use, breathing comfortably  Cardiovascular: S1 and S2 present  Gastrointestinal: normal appearing, nondistended  Extremities: no edema, no cyanosis  Skin: no visible rash    Laboratory Studies:  CBC:                       8.1    15.59 )-----------( 215      ( 17 Aug 2023 06:32 )             28.5     WBC Trend:  15.59 08-17-23 @ 06:32  13.87 08-16-23 @ 06:30  20.91 08-15-23 @ 07:04  19.37 08-14-23 @ 07:25  16.73 08-13-23 @ 07:21  9.05 08-12-23 @ 07:17  10.56 08-11-23 @ 16:05    CMP: 08-17    138  |  102  |  21  ----------------------------<  300<H>  5.1   |  25  |  0.72    Ca    8.5      17 Aug 2023 06:32  Phos  3.9     08-17  Mg     2.3     08-17    TPro  5.1<L>  /  Alb  3.3  /  TBili  0.5  /  DBili  x   /  AST  35  /  ALT  23  /  AlkPhos  72  08-17    Creatinine: 0.72 mg/dL (08-17-23 @ 06:32)  Creatinine: 0.73 mg/dL (08-16-23 @ 06:31)  Creatinine: 0.71 mg/dL (08-15-23 @ 07:02)  Creatinine: 0.76 mg/dL (08-14-23 @ 07:21)  Creatinine: 0.68 mg/dL (08-13-23 @ 07:22)  Creatinine: 0.67 mg/dL (08-12-23 @ 07:21)  Creatinine: 0.80 mg/dL (08-11-23 @ 16:05)    LIVER FUNCTIONS - ( 17 Aug 2023 06:32 )  Alb: 3.3 g/dL / Pro: 5.1 g/dL / ALK PHOS: 72 U/L / ALT: 23 U/L / AST: 35 U/L / GGT: x           Microbiology: reviewed     Culture - Sputum, Cystic Fibrosis (collected 08-17-23 @ 01:18)  Source: .Sputum Sputum  Gram Stain (08-17-23 @ 10:15):    Rare Squamous epithelial cells per low power field    No polymorphonuclear leukocytes per low power field    Rare Gram positive cocci in pairs per oil power field    Culture - Sputum (collected 08-15-23 @ 12:49)  Source: .Sputum Sputum  Gram Stain (08-15-23 @ 21:07):    Rare Squamous epithelial cells per low power field    Rare polymorphonuclear leukocytes per low power field    Rare Yeast like cells per oil power field  Preliminary Report (08-16-23 @ 17:45):    Normal Respiratory Jessica present    Culture - Acid Fast - Sputum w/Smear (collected 08-14-23 @ 13:11)  Source: .Sputum Sputum    Culture - Blood (collected 08-11-23 @ 16:00)  Source: .Blood Blood-Peripheral  Final Report (08-16-23 @ 20:00):    No growth at 5 days    Culture - Blood (collected 08-11-23 @ 15:15)  Source: .Blood Blood-Peripheral  Final Report (08-16-23 @ 20:00):    No growth at 5 days    SARS-CoV-2 Result: Milagro (11 Aug 2023 16:06)    Radiology: reviewed     Medications:  acetaminophen     Tablet .. 650 milliGRAM(s) Oral every 6 hours PRN  albuterol/ipratropium for Nebulization 3 milliLiter(s) Nebulizer every 6 hours  aluminum hydroxide/magnesium hydroxide/simethicone Suspension 30 milliLiter(s) Oral every 4 hours PRN  apixaban 5 milliGRAM(s) Oral every 12 hours  atorvastatin 20 milliGRAM(s) Oral at bedtime  buDESOnide    Inhalation Suspension 0.5 milliGRAM(s) Inhalation two times a day  chlorhexidine 4% Liquid 1 Application(s) Topical daily  dextrose 5%. 1000 milliLiter(s) IV Continuous <Continuous>  dextrose 5%. 1000 milliLiter(s) IV Continuous <Continuous>  dextrose 50% Injectable 25 Gram(s) IV Push once  dextrose 50% Injectable 12.5 Gram(s) IV Push once  dextrose 50% Injectable 25 Gram(s) IV Push once  dextrose Oral Gel 15 Gram(s) Oral once PRN  diphenhydrAMINE 25 milliGRAM(s) Oral daily PRN  ertapenem  IVPB 1000 milliGRAM(s) IV Intermittent every 24 hours  glucagon  Injectable 1 milliGRAM(s) IntraMuscular once  insulin glargine Injectable (LANTUS) 48 Unit(s) SubCutaneous at bedtime  insulin lispro (ADMELOG) corrective regimen sliding scale   SubCutaneous three times a day before meals  insulin lispro (ADMELOG) corrective regimen sliding scale   SubCutaneous at bedtime  insulin lispro Injectable (ADMELOG) 16 Unit(s) SubCutaneous three times a day with meals  melatonin 3 milliGRAM(s) Oral at bedtime PRN  methylPREDNISolone sodium succinate Injectable 40 milliGRAM(s) IV Push two times a day  mexiletine 200 milliGRAM(s) Oral every 8 hours  nystatin    Suspension 009127 Unit(s) Swish and Swallow two times a day  ondansetron Injectable 4 milliGRAM(s) IV Push every 8 hours PRN  polyethylene glycol 3350 17 Gram(s) Oral two times a day  sodium chloride 7% Inhalation 4 milliLiter(s) Inhalation every 6 hours  tiotropium 2.5 MICROgram(s) Inhaler 2 Puff(s) Inhalation daily  trimethoprim  160 mG/sulfamethoxazole 800 mG 1 Tablet(s) Oral daily    Current Antimicrobials:  ertapenem  IVPB 1000 milliGRAM(s) IV Intermittent every 24 hours  nystatin    Suspension 069700 Unit(s) Swish and Swallow two times a day  trimethoprim  160 mG/sulfamethoxazole 800 mG 1 Tablet(s) Oral daily    Prior/Completed Antimicrobials:  ertapenem  IVPB

## 2023-08-17 NOTE — CONSULT NOTE ADULT - SUBJECTIVE AND OBJECTIVE BOX
HPI:  71 yo PMHx  asthma on chronic steroids, bronchiectasis s/p surgery, allergic rhinitis,  recurrent PNA, PND, tracheomalacia s/p tracheoplasty, ESBL proteus infections (sputum), paroxysmal A-fib on Eliquis, colorectal cancer many years ago status post colostomy presenting with concern for shortness of breath. It started 2 and half weeks ago with dysnea on exertion, increased sputum production and running nose. She also endorsed sweatiness, chill, abd pain, denied fever, n/v/c/d, sick contact. she is up to date with vaccines. she has had chronic shortness of breath and cough for years. SHe had a recent hospitalization at an outside hospital  for acute respiratory failure with hypoxia secondary to asthma/COPD exacerbation and bronchopneumonia 6/5/2023 - 6/16/2023), She was found to have ESBL/Proteus/yeast in sputum culture and was treated with ertapenem/Benadryl/vancomycin/aztreonam and methylprednisolone.   She was discharged home with a midline and completed a course of antibiotics ertapenem (last dose 06/25).Dr. Allison also prescribed tobramycin after discharge,  and her metylprednisolone dose was reduced from 32 to 28 mg po daily from last monday to this monday. She states since dose reduction she has had worsened dyspnea. when she called Dr. Allison's office, he urged the patient to come to the ED for further management.     Of note had chronic dyspnea/severe asthma since childhood. She states she had a severe sinus infection requiring surgery at ?10 yo and since then has had difficulties with breathing. She has been on chronic steroids for over 20 years per report.. She uses Breo, Spiriva, albuterol neb and a cough assist device.     In the ED, BP stable 121/65, RR 22, saturating at RA at 100%, 97.5 temp. CBC has 10.56 HGB 10, platelet 227. chemistry normal except glucose 272. procal 0.13, trop 28. Last A1C 9.1 VBG 7.34, pCO2 is 52. CT showed new mucous secretions in the bronchus intermedius.Unchanged infectious/inflammatory small airways disease in the right middle/lower lobes with multifocal small tree-in-bud nodules.Status post ascending aortic and aortic valve replacement, with residual dissection flap better seen on the prior study.Stable indeterminate small left renal nodule and numerous pancreatic cysts. In the ED, she was on Duonebs, LR 500cc, ertapenem adn benadryl was given as well.      Endocrine consulted for uncontrolled diabetes with hyperglycemia exacerbated by steroids now on Solumedrol 40mg IV q12.  Seen on prior admission 11/2022 after she presented to ED at [facility] with epigastric pain, belching and central chest pain. Found on CTA to have type A aortic dissection and transferred to [facility] for surgical evaluation by Dr. Cabrera s/p dissection repair.     DM diagnosis: DM2 as teenager  Last A1c: 9.1%  Endocrinologist: Dr. Saavedra  Home DM meds: Lantus 25-30 units qhs. Novolog 7-20 TIDac, counts carbs so doses vary but usually takes around 10 units pre-meal. If BG high after meals, sometimes takes an additional 2 units Novolog. Of note, patient has been on steroids.   Microvascular complications: None  Macrovascular complications: ?CAD  SMBG: Fasting BG low 100, infrequent lows. Later in the day can be as high as 300.  Symptoms: No sxs of high or low BG  Diet at home: Sometimes snacks on fruit, counts carbs  Appetite in the hospital: Getting glucerna through NG tube but switching to PO diet  PMHx: As above. No known hx of HF, pancreatitis or UTIs.        PAST MEDICAL & SURGICAL HISTORY:  Atrial fibrillation  paroxysmal, on eliquis      Diabetes  Type 2      COPD (chronic obstructive pulmonary disease)      Adrenal insufficiency  Medrol daily for over 50 years      Aortic insufficiency  moderate AR on echo 5/3/2018      Pelvic fracture      Asthma      Tracheobronchomalacia  diagnosed 2015, s/p bronchial thermoplasty 2016 (Dr Zapien); recent bronchoscopy 6/5/2018 revealed no evidence of tracheobronchomalacia in trachea or bronchial tubes      Colorectal cancer  4/2018- last treatment , chemo and radiation      Rectal bleeding      Seizure  x 1 1/7/18      DVT (deep venous thrombosis)  15-20 years ago, took coumadin      TIA (transient ischemic attack)  multiple, last 5 years ago - presents as right-sided weakness      History of partial hysterectomy  30 years ago - fibroids      H/O total knee replacement, bilateral  5 years ago      History of sinus surgery  multiple sinus surgeries      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      H/O pelvic surgery  5 years ago - s/p fracture      History of tracheomalacia  2015 - attempted tracheal stenting ([facility])- course complicated by obstruction, respiratory failure, multiple CPR attempts -  stent discontinued; 10/20/2016 Tracheobronchoplasty (Prolene Mesh) performed at [facility] by Dr Zapien      S/P bronchoscopy  6/5/2018 - [facility] (Dr Zapien) no evidence of tracheobronchomalacia in trachea or bronchial tubes      Rectal bleeding  exam under anesthesia ([facility]) 2/2018          FAMILY HISTORY:  Family history of asthma    Family history of breast cancer (Sibling)    Family history of diabetes mellitus type II        Social History:    Outpatient Medications:    MEDICATIONS  (STANDING):  albuterol/ipratropium for Nebulization 3 milliLiter(s) Nebulizer every 6 hours  apixaban 5 milliGRAM(s) Oral every 12 hours  atorvastatin 20 milliGRAM(s) Oral at bedtime  buDESOnide    Inhalation Suspension 0.5 milliGRAM(s) Inhalation two times a day  chlorhexidine 4% Liquid 1 Application(s) Topical daily  dextrose 5%. 1000 milliLiter(s) (50 mL/Hr) IV Continuous <Continuous>  dextrose 5%. 1000 milliLiter(s) (100 mL/Hr) IV Continuous <Continuous>  dextrose 50% Injectable 25 Gram(s) IV Push once  dextrose 50% Injectable 12.5 Gram(s) IV Push once  dextrose 50% Injectable 25 Gram(s) IV Push once  ertapenem  IVPB 1000 milliGRAM(s) IV Intermittent every 24 hours  glucagon  Injectable 1 milliGRAM(s) IntraMuscular once  insulin glargine Injectable (LANTUS) 48 Unit(s) SubCutaneous at bedtime  insulin lispro (ADMELOG) corrective regimen sliding scale   SubCutaneous three times a day before meals  insulin lispro (ADMELOG) corrective regimen sliding scale   SubCutaneous at bedtime  insulin lispro Injectable (ADMELOG) 16 Unit(s) SubCutaneous three times a day with meals  methylPREDNISolone sodium succinate Injectable 40 milliGRAM(s) IV Push two times a day  mexiletine 200 milliGRAM(s) Oral every 8 hours  nystatin    Suspension 463272 Unit(s) Swish and Swallow two times a day  polyethylene glycol 3350 17 Gram(s) Oral two times a day  sodium chloride 7% Inhalation 4 milliLiter(s) Inhalation every 6 hours  tiotropium 2.5 MICROgram(s) Inhaler 2 Puff(s) Inhalation daily  trimethoprim  160 mG/sulfamethoxazole 800 mG 1 Tablet(s) Oral daily    MEDICATIONS  (PRN):  acetaminophen     Tablet .. 650 milliGRAM(s) Oral every 6 hours PRN Temp greater or equal to 38C (100.4F), Mild Pain (1 - 3)  aluminum hydroxide/magnesium hydroxide/simethicone Suspension 30 milliLiter(s) Oral every 4 hours PRN Dyspepsia  dextrose Oral Gel 15 Gram(s) Oral once PRN Blood Glucose LESS THAN 70 milliGRAM(s)/deciliter  diphenhydrAMINE 25 milliGRAM(s) Oral daily PRN Allergy symptoms  melatonin 3 milliGRAM(s) Oral at bedtime PRN Insomnia  ondansetron Injectable 4 milliGRAM(s) IV Push every 8 hours PRN Nausea and/or Vomiting      Allergies    tetanus toxoid (Short breath)  cefepime (Anaphylaxis)  penicillin (Anaphylaxis)  Avelox (Short breath; Pruritus)  Dilaudid (Short breath)  codeine (Short breath)  aspirin (Short breath)  iodine (Short breath; Swelling)  Valium (Short breath)  shellfish (Anaphylaxis)    Intolerances      Review of Systems:  Constitutional: No fever, No change in weight  Eyes: No blurry vision  Neuro: No headache, No paresthesias  HEENT: No throat pain  Cardiovascular: No chest pain  Respiratory: No SOB  GI: No nausea or vomiting  : No polyuria  Skin: no rash  Psych: no depression  Endocrine: No polydipsia, No heat or cold intolerance, rest as noted in HPI  Hem/lymph: no swelling    All other review of systems negative      PHYSICAL EXAM:  VITALS: T(C): 36.8 (08-17-23 @ 06:00)  T(F): 98.3 (08-17-23 @ 06:00), Max: 99.6 (08-16-23 @ 20:48)  HR: 73 (08-17-23 @ 06:00) (73 - 85)  BP: 103/56 (08-17-23 @ 06:00) (103/56 - 154/84)  RR:  (18 - 24)  SpO2:  (96% - 100%)  Wt(kg): --  GENERAL: NAD at this time  EYES: No proptosis, EOMI  HEENT:  Atraumatic, Normocephalic,   THYROID: Normal size, no palpable nodules  RESPIRATORY: Clear to auscultation bilaterally, full excursion, non-labored  CARDIOVASCULAR: Regular rhythm; No murmurs; no peripheral edema  GI: Soft, nontender, non distended, normal bowel sounds  SKIN: Dry, intact, No rashes or lesions  MUSCULOSKELETAL: normal strength  NEURO: follows commands, no tremor, normal reflexes  PSYCH: Alert and oriented x 3, normal affect, normal mood  CUSHING'S SIGNS: no striae      POCT Blood Glucose.: 319 mg/dL (08-17-23 @ 08:20)  POCT Blood Glucose.: 134 mg/dL (08-16-23 @ 21:13)  POCT Blood Glucose.: 220 mg/dL (08-16-23 @ 17:21)  POCT Blood Glucose.: 335 mg/dL (08-16-23 @ 12:55)  POCT Blood Glucose.: 307 mg/dL (08-16-23 @ 08:45)  POCT Blood Glucose.: 117 mg/dL (08-15-23 @ 21:11)  POCT Blood Glucose.: 95 mg/dL (08-15-23 @ 17:41)  POCT Blood Glucose.: 185 mg/dL (08-15-23 @ 13:18)  POCT Blood Glucose.: 217 mg/dL (08-15-23 @ 09:03)  POCT Blood Glucose.: 182 mg/dL (08-14-23 @ 21:27)  POCT Blood Glucose.: 216 mg/dL (08-14-23 @ 17:24)  POCT Blood Glucose.: 375 mg/dL (08-14-23 @ 12:34)                              8.1    15.59 )-----------( 215      ( 17 Aug 2023 06:32 )             28.5       08-17    138  |  102  |  21  ----------------------------<  300<H>  5.1   |  25  |  0.72    eGFR: 88    Ca    8.5      08-17  Mg     2.3     08-17  Phos  3.9     08-17    TPro  5.1<L>  /  Alb  3.3  /  TBili  0.5  /  DBili  x   /  AST  35  /  ALT  23  /  AlkPhos  72  08-17    Thyroid Function Tests:            Radiology:                  HPI:  71 yo PMHx  asthma on chronic steroids, bronchiectasis s/p surgery, allergic rhinitis,  recurrent PNA, PND, tracheomalacia s/p tracheoplasty, ESBL proteus infections (sputum), paroxysmal A-fib on Eliquis, colorectal cancer many years ago status post colostomy presenting with concern for shortness of breath. It started 2 and half weeks ago with dysnea on exertion, increased sputum production and running nose. She also endorsed sweatiness, chill, abd pain, denied fever, n/v/c/d, sick contact. she is up to date with vaccines. she has had chronic shortness of breath and cough for years. SHe had a recent hospitalization at an outside hospital  for acute respiratory failure with hypoxia secondary to asthma/COPD exacerbation and bronchopneumonia 6/5/2023 - 6/16/2023), She was found to have ESBL/Proteus/yeast in sputum culture and was treated with ertapenem/Benadryl/vancomycin/aztreonam and methylprednisolone.   She was discharged home with a midline and completed a course of antibiotics ertapenem (last dose 06/25).Dr. Allison also prescribed tobramycin after discharge,  and her metylprednisolone dose was reduced from 32 to 28 mg po daily from last monday to this monday. She states since dose reduction she has had worsened dyspnea. when she called Dr. Allison's office, he urged the patient to come to the ED for further management.     Of note had chronic dyspnea/severe asthma since childhood. She states she had a severe sinus infection requiring surgery at ?10 yo and since then has had difficulties with breathing. She has been on chronic steroids for over 20 years per report.. She uses Breo, Spiriva, albuterol neb and a cough assist device.     In the ED, BP stable 121/65, RR 22, saturating at RA at 100%, 97.5 temp. CBC has 10.56 HGB 10, platelet 227. chemistry normal except glucose 272. procal 0.13, trop 28. Last A1C 9.1 VBG 7.34, pCO2 is 52. CT showed new mucous secretions in the bronchus intermedius.Unchanged infectious/inflammatory small airways disease in the right middle/lower lobes with multifocal small tree-in-bud nodules.Status post ascending aortic and aortic valve replacement, with residual dissection flap better seen on the prior study.Stable indeterminate small left renal nodule and numerous pancreatic cysts. In the ED, she was on Duonebs, LR 500cc, ertapenem adn benadryl was given as well.      Endocrine consulted for uncontrolled diabetes with hyperglycemia exacerbated by steroids now on Solumedrol 40mg IV q12.  Seen on prior admission 11/2022 after she presented to ED at [facility] with epigastric pain, belching and central chest pain. Found on CTA to have type A aortic dissection and transferred to [facility] for surgical evaluation by Dr. Cabrera s/p dissection repair.     DM diagnosis: DM2 as teenager  Last A1c: 9.1%  Endocrinologist: Dr. Saavedra  Home DM meds: Lantus 30-40 units qhs. Novolog 7-15 TIDac, counts carbs so doses vary but usually takes around 7 units pre-meal. If BG high after meals, sometimes takes an additional 2 units Novolog. Of note, patient has been on steroids.   Microvascular complications: None  Macrovascular complications: ?CAD  Glucose values at home have been variable ranging from  without symptoms of hypoglycemia.  Diet at home: Sometimes snacks on fruit, counts carbs  PMHx: As above. No known hx of HF, pancreatitis or UTIs.        PAST MEDICAL & SURGICAL HISTORY:  Atrial fibrillation  paroxysmal, on eliquis      Diabetes  Type 2      COPD (chronic obstructive pulmonary disease)      Adrenal insufficiency  Medrol daily for over 50 years      Aortic insufficiency  moderate AR on echo 5/3/2018      Pelvic fracture      Asthma      Tracheobronchomalacia  diagnosed 2015, s/p bronchial thermoplasty 2016 (Dr Zapien); recent bronchoscopy 6/5/2018 revealed no evidence of tracheobronchomalacia in trachea or bronchial tubes      Colorectal cancer  4/2018- last treatment , chemo and radiation      Rectal bleeding      Seizure  x 1 1/7/18      DVT (deep venous thrombosis)  15-20 years ago, took coumadin      TIA (transient ischemic attack)  multiple, last 5 years ago - presents as right-sided weakness      History of partial hysterectomy  30 years ago - fibroids      H/O total knee replacement, bilateral  5 years ago      History of sinus surgery  multiple sinus surgeries      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      H/O pelvic surgery  5 years ago - s/p fracture      History of tracheomalacia  2015 - attempted tracheal stenting ([facility])- course complicated by obstruction, respiratory failure, multiple CPR attempts -  stent discontinued; 10/20/2016 Tracheobronchoplasty (Prolene Mesh) performed at [facility] by Dr Zapien      S/P bronchoscopy  6/5/2018 - [facility] (Dr Zapien) no evidence of tracheobronchomalacia in trachea or bronchial tubes      Rectal bleeding  exam under anesthesia ([facility]) 2/2018          FAMILY HISTORY:  Family history of asthma    Family history of breast cancer (Sibling)    Family history of diabetes mellitus type II- Mother and Father        Social History: No tobacco or alcohol use    Outpatient Medications:  · 	Crestor 5 mg oral tablet: Last Dose Taken:  , 1 tab(s) orally once a day (at bedtime)  · 	Medrol 4 mg oral tablet: Last Dose Taken:  , 6 tab(s) orally once a day Medrol Taper: Start with 24mg once a day x4 days, then decrease by 4mg every 4 days, then take 4mg daily until told to stop by your pulmonologist  · 	apixaban 5 mg oral tablet: Last Dose Taken:  , 1 tab(s) orally 2 times a day  · 	Breo Ellipta 200 mcg-25 mcg/inh inhalation powder: Last Dose Taken:  , 1 puff(s) inhaled once a day  · 	mexiletine 200 mg oral capsule: Last Dose Taken:  , 1 cap(s) orally every 8 hours  · 	budesonide 0.5 mg/2 mL inhalation suspension: Last Dose Taken:  , 2 milliliter(s) inhaled 2 times a day  · 	Incruse Ellipta 62.5 mcg/inh inhalation powder: Last Dose Taken:  , 1 puff(s) inhaled every 24 hours  · 	Basaglar KwikPen 100 units/mL subcutaneous solution: Last Dose Taken:  , 45 unit(s) subcutaneous once a day (at bedtime)  · 	Bactrim  mg-160 mg oral tablet: Last Dose Taken:  , 1 tab(s) orally once a day  · 	ondansetron 4 mg oral tablet: Last Dose Taken:  , 1 tab(s) orally every 6 hours as needed for  nausea  · 	guaiFENesin 600 mg oral tablet, extended release: Last Dose Taken:  , 1 tab(s) orally every 12 hours  · 	diphenhydrAMINE 50 mg oral capsule: Last Dose Taken:  , 1 cap(s) orally every 6 hours as needed for Rash and/or Itching  · 	albuterol 2.5 mg/3 mL (0.083%) inhalation solution: Last Dose Taken:  , 2.5 milligram(s) by nebulizer every 6 hours as needed for  shortness of breath and/or wheezing  · 	HumaLOG KwikPen 100 units/mL injectable solution: Last Dose Taken:  , 5 unit(s) subcutaneously 3 times a day (before meals) Inject as per sliding scale: 100-150 = 5 units, 151-200 = 6 units, 201-250 = 7 units, 251-300 = 8 units, 301-350 = 9 units, 351-400 = 10 units, > 400 = call md  · 	Albuterol (Eqv-Ventolin HFA) 90 mcg/inh inhalation aerosol: Last Dose Taken:  , 2 puff(s) inhaled 3 times a day as needed for  shortness of breath and/or wheezing after neb  · 	alcohol swabs: Last Dose Taken:  , Apply topically to affected area 4 times a day  · 	glucometer (per patient's insurance): Last Dose Taken:  , Test blood sugars four times a day. Dispense #1 glucometer.  · 	Insulin Pen Needles, 4mm: Last Dose Taken:  , 1 application subcutaneously 4 times a day. ** Use with insulin pen **  · 	test strips (per patient's insurance): Last Dose Taken:  , 1 application subcutaneously 4 times a day. ** Compatible with patient's glucometer **  · 	lancets: Last Dose Taken:  , 1 application subcutaneously 4 times a day    MEDICATIONS  (STANDING):  albuterol/ipratropium for Nebulization 3 milliLiter(s) Nebulizer every 6 hours  apixaban 5 milliGRAM(s) Oral every 12 hours  atorvastatin 20 milliGRAM(s) Oral at bedtime  buDESOnide    Inhalation Suspension 0.5 milliGRAM(s) Inhalation two times a day  chlorhexidine 4% Liquid 1 Application(s) Topical daily  dextrose 5%. 1000 milliLiter(s) (50 mL/Hr) IV Continuous <Continuous>  dextrose 5%. 1000 milliLiter(s) (100 mL/Hr) IV Continuous <Continuous>  dextrose 50% Injectable 25 Gram(s) IV Push once  dextrose 50% Injectable 12.5 Gram(s) IV Push once  dextrose 50% Injectable 25 Gram(s) IV Push once  ertapenem  IVPB 1000 milliGRAM(s) IV Intermittent every 24 hours  glucagon  Injectable 1 milliGRAM(s) IntraMuscular once  insulin glargine Injectable (LANTUS) 48 Unit(s) SubCutaneous at bedtime  insulin lispro (ADMELOG) corrective regimen sliding scale   SubCutaneous three times a day before meals  insulin lispro (ADMELOG) corrective regimen sliding scale   SubCutaneous at bedtime  insulin lispro Injectable (ADMELOG) 16 Unit(s) SubCutaneous three times a day with meals  methylPREDNISolone sodium succinate Injectable 40 milliGRAM(s) IV Push two times a day  mexiletine 200 milliGRAM(s) Oral every 8 hours  nystatin    Suspension [id number] Unit(s) Swish and Swallow two times a day  polyethylene glycol 3350 17 Gram(s) Oral two times a day  sodium chloride 7% Inhalation 4 milliLiter(s) Inhalation every 6 hours  tiotropium 2.5 MICROgram(s) Inhaler 2 Puff(s) Inhalation daily  trimethoprim  160 mG/sulfamethoxazole 800 mG 1 Tablet(s) Oral daily    MEDICATIONS  (PRN):  acetaminophen     Tablet .. 650 milliGRAM(s) Oral every 6 hours PRN Temp greater or equal to 38C (100.4F), Mild Pain (1 - 3)  aluminum hydroxide/magnesium hydroxide/simethicone Suspension 30 milliLiter(s) Oral every 4 hours PRN Dyspepsia  dextrose Oral Gel 15 Gram(s) Oral once PRN Blood Glucose LESS THAN 70 milliGRAM(s)/deciliter  diphenhydrAMINE 25 milliGRAM(s) Oral daily PRN Allergy symptoms  melatonin 3 milliGRAM(s) Oral at bedtime PRN Insomnia  ondansetron Injectable 4 milliGRAM(s) IV Push every 8 hours PRN Nausea and/or Vomiting      Allergies    tetanus toxoid (Short breath)  cefepime (Anaphylaxis)  penicillin (Anaphylaxis)  Avelox (Short breath; Pruritus)  Dilaudid (Short breath)  codeine (Short breath)  aspirin (Short breath)  iodine (Short breath; Swelling)  Valium (Short breath)  shellfish (Anaphylaxis)    Intolerances      Review of Systems:  Constitutional: No fever, No change in weight  Eyes: No blurry vision  Neuro: No headache, No paresthesias  HEENT: No throat pain  Cardiovascular: No chest pain  Respiratory: + SOB  GI: No nausea or vomiting  : No polyuria  Skin: no rash  Psych: no depression  Endocrine: No polydipsia, No heat or cold intolerance, rest as noted in HPI  Hem/lymph: no swelling    All other review of systems negative      PHYSICAL EXAM:  VITALS: T(C): 36.8 (08-17-23 @ 06:00)  T(F): 98.3 (08-17-23 @ 06:00), Max: 99.6 (08-16-23 @ 20:48)  HR: 73 (08-17-23 @ 06:00) (73 - 85)  BP: 103/56 (08-17-23 @ 06:00) (103/56 - 154/84)  RR:  (18 - 24)  SpO2:  (96% - 100%)  Wt(kg): --  GENERAL: NAD at this time  EYES: No proptosis, EOMI  HEENT:  Atraumatic, Normocephalic,   THYROID: Normal size, no palpable nodules  RESPIRATORY: Clear to auscultation bilaterally, full excursion, non-labored  CARDIOVASCULAR: Regular rhythm; No murmurs; no peripheral edema  GI: Soft, nontender, non distended, normal bowel sounds  SKIN: Dry, intact, No rashes or lesions  MUSCULOSKELETAL: normal strength  NEURO: follows commands  PSYCH: Alert and oriented x 3, normal affect, normal mood  CUSHING'S SIGNS: no striae      POCT Blood Glucose.: 319 mg/dL (08-17-23 @ 08:20)  POCT Blood Glucose.: 134 mg/dL (08-16-23 @ 21:13)  POCT Blood Glucose.: 220 mg/dL (08-16-23 @ 17:21)  POCT Blood Glucose.: 335 mg/dL (08-16-23 @ 12:55)  POCT Blood Glucose.: 307 mg/dL (08-16-23 @ 08:45)  POCT Blood Glucose.: 117 mg/dL (08-15-23 @ 21:11)  POCT Blood Glucose.: 95 mg/dL (08-15-23 @ 17:41)  POCT Blood Glucose.: 185 mg/dL (08-15-23 @ 13:18)  POCT Blood Glucose.: 217 mg/dL (08-15-23 @ 09:03)  POCT Blood Glucose.: 182 mg/dL (08-14-23 @ 21:27)  POCT Blood Glucose.: 216 mg/dL (08-14-23 @ 17:24)  POCT Blood Glucose.: 375 mg/dL (08-14-23 @ 12:34)                              8.1    15.59 )-----------( 215      ( 17 Aug 2023 06:32 )             28.5       08-17    138  |  102  |  21  ----------------------------<  300<H>  5.1   |  25  |  0.72    eGFR: 88    Ca    8.5      08-17  Mg     2.3     08-17  Phos  3.9     08-17    TPro  5.1<L>  /  Alb  3.3  /  TBili  0.5  /  DBili  x   /  AST  35  /  ALT  23  /  AlkPhos  72  08-17    Thyroid Function Tests:            Radiology:

## 2023-08-17 NOTE — CONSULT NOTE ADULT - ASSESSMENT
73 y/o F w/ uncontrolled Type 2 DM w/ hyperglycemia, HTN, HLD admitted with SOB (high risk patient with severely uncontrolled diabetes with severe hyperglycemia with glucose values > 300's on high dose steroids at high risk of CAD and CVA with high level decision-making).

## 2023-08-17 NOTE — PROGRESS NOTE ADULT - ASSESSMENT
73 yo F with PMHx  asthma on chronic steroids, bronchiectasis, allergic rhinitis,  recurrent PNA, PND, tracheomalacia s/p tracheoplasty, tracheobronchomalacia, ESBL proteus infections (sputum),  diabetes, paroxysmal A-fib on Eliquis, colorectal cancer many years ago status post colostomy, aortic dissection s/p ascending aorta repair presenting with acute on chronic shortness of breath in the setting of recent completion of abx and tapering of her steroids as an outpatient. She reports increased cough/sputum production, worsened dyspnea/wheezing.  RVP (-). Procal 0.13. CTA neg for PE but showing new mucous secretions in BI, mild RLL BE, RML/RLL TIB opacities and some mild central bilateral GGOs.  Had suspected asthma exacerbation/acute bronchitis but patient reports quality of symptoms different from prior exacerbations and unable to pinpoint difference- noted desaturation when ambulating to bathroom but otherwise normal O2Sat at rest. CXR without overt parenchymal abnormalities/infilatrates to suggest evolving PNA. No role for bronchoscopy at this time given treatment for asthma exacerbation, TBM and lack of overt CXR/CT findings - will continue to try to obtain resp culture noninvasively.    - PLEASE check ambulatory pulse ox  - remains afebrile but with persistent leukocytosis  - send sputum cultures for bacterial, fungal and AFB smear/cx (to eval for NTM) -  - one culture sent 8/15/23, PLEASE repeat additional cultures for bacterial/AFB/fungal  - continue hypertonic saline to 7% WITH albuterol neb q6h standing, followed by use of airway clearance device (patient brought her own from home)  - chest PT as tolerated   - cont Budesonide neb should be on 0.5mg q12h   - cont methylprednisolone to 40 mg IV BID and monitor for response  - ID following, recs appreciated, currently on Ertapenem (8/12 -)  - repeat limited echo with doppler for better evaluation of TAVR and perform with bubble  - PT/OT, OOB to chair as much as possible    Radha Desai MD         73 yo F with PMHx  asthma on chronic steroids, bronchiectasis, allergic rhinitis,  recurrent PNA, PND, tracheomalacia s/p tracheoplasty, tracheobronchomalacia, ESBL proteus infections (sputum),  diabetes, paroxysmal A-fib on Eliquis, colorectal cancer many years ago status post colostomy, aortic dissection s/p ascending aorta repair presenting with acute on chronic shortness of breath in the setting of recent completion of abx and tapering of her steroids as an outpatient. She reports increased cough/sputum production, worsened dyspnea/wheezing.  RVP (-). Procal 0.13. CTA neg for PE but showing new mucous secretions in BI, mild RLL BE, RML/RLL TIB opacities and some mild central bilateral GGOs.  Had suspected asthma exacerbation/acute bronchitis but patient reports quality of symptoms different from prior exacerbations and unable to pinpoint difference- noted desaturation when ambulating to bathroom but otherwise normal O2Sat at rest. CXR without overt parenchymal abnormalities/infilatrates to suggest evolving PNA. No role for bronchoscopy at this time given treatment for asthma exacerbation, TBM and lack of overt CXR/CT findings - will continue to try to obtain resp culture noninvasively. Resp culture with normal val but had some epith cells.  AFB smear negative.  Etiology of her persistent dyspnea not clear, patient is not obviously dyspneic nor hypoxic. Cough apparent only with deep breathing likely a limitation of her TBM.    - PLEASE check ambulatory pulse ox  - remains afebrile, WBC down to 15.6  - send repeat sputum culture (bacterial) as well as fungal culture  - continue hypertonic saline to 7% WITH albuterol neb q6h standing, followed by use of airway clearance device (patient brought her own from home)  - chest PT as tolerated   - cont Budesonide neb should be on 0.5mg q12h   - cont methylprednisolone but can decrease to 40 mg daily  - ID following, recs appreciated, currently on Ertapenem to complete 8/18, of note not covering for Pseudomonas or MRSA which has been isolated on previous cultures  - repeat limited echo with doppler for better evaluation of TAVR not performed; but bubble study negative, no e/o shunt  - PT/OT, OOB to chair as much as possible    Radha Desai MD

## 2023-08-18 LAB
1,3 BETA GLUCAN SER QL: NEGATIVE — SIGNIFICANT CHANGE UP
ALBUMIN SERPL ELPH-MCNC: 3.3 G/DL — SIGNIFICANT CHANGE UP (ref 3.3–5)
ALP SERPL-CCNC: 83 U/L — SIGNIFICANT CHANGE UP (ref 40–120)
ALT FLD-CCNC: 26 U/L — SIGNIFICANT CHANGE UP (ref 10–45)
ANION GAP SERPL CALC-SCNC: 16 MMOL/L — SIGNIFICANT CHANGE UP (ref 5–17)
AST SERPL-CCNC: 70 U/L — HIGH (ref 10–40)
BILIRUB SERPL-MCNC: 0.5 MG/DL — SIGNIFICANT CHANGE UP (ref 0.2–1.2)
BUN SERPL-MCNC: 23 MG/DL — SIGNIFICANT CHANGE UP (ref 7–23)
CALCIUM SERPL-MCNC: 8.5 MG/DL — SIGNIFICANT CHANGE UP (ref 8.4–10.5)
CHLORIDE SERPL-SCNC: 102 MMOL/L — SIGNIFICANT CHANGE UP (ref 96–108)
CO2 SERPL-SCNC: 17 MMOL/L — LOW (ref 22–31)
CREAT SERPL-MCNC: 0.79 MG/DL — SIGNIFICANT CHANGE UP (ref 0.5–1.3)
EGFR: 78 ML/MIN/1.73M2 — SIGNIFICANT CHANGE UP
FUNGITELL: <31 PG/ML — SIGNIFICANT CHANGE UP
GLUCOSE BLDC GLUCOMTR-MCNC: 151 MG/DL — HIGH (ref 70–99)
GLUCOSE BLDC GLUCOMTR-MCNC: 235 MG/DL — HIGH (ref 70–99)
GLUCOSE BLDC GLUCOMTR-MCNC: 245 MG/DL — HIGH (ref 70–99)
GLUCOSE BLDC GLUCOMTR-MCNC: 81 MG/DL — SIGNIFICANT CHANGE UP (ref 70–99)
GLUCOSE SERPL-MCNC: 66 MG/DL — LOW (ref 70–99)
GRAM STN FLD: SIGNIFICANT CHANGE UP
MAGNESIUM SERPL-MCNC: 2.5 MG/DL — SIGNIFICANT CHANGE UP (ref 1.6–2.6)
PHOSPHATE SERPL-MCNC: 3.6 MG/DL — SIGNIFICANT CHANGE UP (ref 2.5–4.5)
POTASSIUM SERPL-MCNC: 6.3 MMOL/L — CRITICAL HIGH (ref 3.5–5.3)
POTASSIUM SERPL-SCNC: 6.3 MMOL/L — CRITICAL HIGH (ref 3.5–5.3)
PROT SERPL-MCNC: 6.6 G/DL — SIGNIFICANT CHANGE UP (ref 6–8.3)
SODIUM SERPL-SCNC: 135 MMOL/L — SIGNIFICANT CHANGE UP (ref 135–145)
SPECIMEN SOURCE: SIGNIFICANT CHANGE UP

## 2023-08-18 PROCEDURE — 99233 SBSQ HOSP IP/OBS HIGH 50: CPT

## 2023-08-18 PROCEDURE — 99232 SBSQ HOSP IP/OBS MODERATE 35: CPT

## 2023-08-18 RX ORDER — LINEZOLID 600 MG/300ML
600 INJECTION, SOLUTION INTRAVENOUS EVERY 12 HOURS
Refills: 0 | Status: COMPLETED | OUTPATIENT
Start: 2023-08-18 | End: 2023-08-24

## 2023-08-18 RX ORDER — INSULIN GLARGINE 100 [IU]/ML
40 INJECTION, SOLUTION SUBCUTANEOUS AT BEDTIME
Refills: 0 | Status: DISCONTINUED | OUTPATIENT
Start: 2023-08-18 | End: 2023-08-19

## 2023-08-18 RX ORDER — PANTOPRAZOLE SODIUM 20 MG/1
40 TABLET, DELAYED RELEASE ORAL
Refills: 0 | Status: DISCONTINUED | OUTPATIENT
Start: 2023-08-18 | End: 2023-09-06

## 2023-08-18 RX ADMIN — POLYETHYLENE GLYCOL 3350 17 GRAM(S): 17 POWDER, FOR SOLUTION ORAL at 06:34

## 2023-08-18 RX ADMIN — Medication 16 UNIT(S): at 18:27

## 2023-08-18 RX ADMIN — ERTAPENEM SODIUM 120 MILLIGRAM(S): 1 INJECTION, POWDER, LYOPHILIZED, FOR SOLUTION INTRAMUSCULAR; INTRAVENOUS at 19:12

## 2023-08-18 RX ADMIN — ATORVASTATIN CALCIUM 20 MILLIGRAM(S): 80 TABLET, FILM COATED ORAL at 22:15

## 2023-08-18 RX ADMIN — Medication 30 MILLILITER(S): at 19:15

## 2023-08-18 RX ADMIN — SENNA PLUS 2 TABLET(S): 8.6 TABLET ORAL at 22:18

## 2023-08-18 RX ADMIN — Medication 3 MILLILITER(S): at 13:21

## 2023-08-18 RX ADMIN — APIXABAN 5 MILLIGRAM(S): 2.5 TABLET, FILM COATED ORAL at 18:25

## 2023-08-18 RX ADMIN — INSULIN GLARGINE 40 UNIT(S): 100 INJECTION, SOLUTION SUBCUTANEOUS at 22:12

## 2023-08-18 RX ADMIN — Medication 4: at 13:20

## 2023-08-18 RX ADMIN — Medication 16 UNIT(S): at 13:20

## 2023-08-18 RX ADMIN — APIXABAN 5 MILLIGRAM(S): 2.5 TABLET, FILM COATED ORAL at 06:34

## 2023-08-18 RX ADMIN — MEXILETINE HYDROCHLORIDE 200 MILLIGRAM(S): 150 CAPSULE ORAL at 22:16

## 2023-08-18 RX ADMIN — Medication 0.5 MILLIGRAM(S): at 18:25

## 2023-08-18 RX ADMIN — Medication 3 MILLILITER(S): at 18:26

## 2023-08-18 RX ADMIN — Medication 3 MILLILITER(S): at 06:35

## 2023-08-18 RX ADMIN — SODIUM CHLORIDE 4 MILLILITER(S): 9 INJECTION INTRAMUSCULAR; INTRAVENOUS; SUBCUTANEOUS at 23:05

## 2023-08-18 RX ADMIN — Medication 3 MILLILITER(S): at 23:07

## 2023-08-18 RX ADMIN — Medication 500000 UNIT(S): at 18:25

## 2023-08-18 RX ADMIN — Medication 500000 UNIT(S): at 06:34

## 2023-08-18 RX ADMIN — Medication 40 MILLIGRAM(S): at 06:34

## 2023-08-18 RX ADMIN — SODIUM CHLORIDE 4 MILLILITER(S): 9 INJECTION INTRAMUSCULAR; INTRAVENOUS; SUBCUTANEOUS at 06:35

## 2023-08-18 RX ADMIN — CHLORHEXIDINE GLUCONATE 1 APPLICATION(S): 213 SOLUTION TOPICAL at 13:21

## 2023-08-18 RX ADMIN — MEXILETINE HYDROCHLORIDE 200 MILLIGRAM(S): 150 CAPSULE ORAL at 06:34

## 2023-08-18 RX ADMIN — MEXILETINE HYDROCHLORIDE 200 MILLIGRAM(S): 150 CAPSULE ORAL at 13:22

## 2023-08-18 RX ADMIN — Medication 4: at 17:23

## 2023-08-18 RX ADMIN — SODIUM CHLORIDE 4 MILLILITER(S): 9 INJECTION INTRAMUSCULAR; INTRAVENOUS; SUBCUTANEOUS at 18:26

## 2023-08-18 RX ADMIN — SODIUM CHLORIDE 4 MILLILITER(S): 9 INJECTION INTRAMUSCULAR; INTRAVENOUS; SUBCUTANEOUS at 13:21

## 2023-08-18 RX ADMIN — TIOTROPIUM BROMIDE 2 PUFF(S): 18 CAPSULE ORAL; RESPIRATORY (INHALATION) at 13:21

## 2023-08-18 RX ADMIN — POLYETHYLENE GLYCOL 3350 17 GRAM(S): 17 POWDER, FOR SOLUTION ORAL at 18:25

## 2023-08-18 RX ADMIN — Medication 1 TABLET(S): at 13:21

## 2023-08-18 RX ADMIN — Medication 0.5 MILLIGRAM(S): at 06:35

## 2023-08-18 RX ADMIN — LINEZOLID 600 MILLIGRAM(S): 600 INJECTION, SOLUTION INTRAVENOUS at 19:11

## 2023-08-18 NOTE — PROGRESS NOTE ADULT - ASSESSMENT
72 F w/h/o uncontrolled T2DM (A1C 9.4%) on basal/bolus insulin PTA. Unknown DM complications. Also COPD, secondary adrenal Insufficiency on chronic steroids, colorectal cancer s/p resection (colostomy bag), Chronic A fib on Eliquis, and tracheomalacia and multiple intubations, type A aortic dissection s/p aortic dissection repair on 9/07/22, sepsis. Pt well known to this provider from prior admissions. Here with SOB> treated for PNA and on steroid pulse coming down to Prednisone 40mg tomorrow. Endocrine consulted for assistance with uncontrolled DM/steroids for AI on steroid pulse for SOB . BG levels going up after steroid doses are given but with fasting hypoglycemia while on present insulin doses. Will decrease doses of Lantus and will assess for further needs of premeal insulin once pt receives ordered meal time insulin for lunch today. BG goal 100 to 180s without hypoglycemia.

## 2023-08-18 NOTE — PROGRESS NOTE ADULT - SUBJECTIVE AND OBJECTIVE BOX
DIABETES FOLLOW UP NOTE: Saw pt earlier today    Chief Complaint: Endocrine consult requested for management of T2DM    INTERVAL HX: Pt stable, sleepy at time of visit. Reports eating well but unable to recall what she had for breakfast. States breathing better,  just feeling tired and sleepy. BG levels variable. Noted FBG 81 per POC ac breakfast and  66 per BMP earlier today. Pt denies any s/sx alberto hypoglycemia besides feeling tired most of the time. Remains on MTP 40mg daily with rebound hyperglycemia ac lunch after meal time insulin was held this am for BG <100. Noted pt will start Prednisone 40mg daily tomorrow.       Review of Systems:  General: As above  Cardiovascular: No chest pain, palpitations  Respiratory: No SOB, no cough  GI: No nausea, vomiting, abdominal pain  Endocrine: No polyuria, polydipsia or S&Sx of hypoglycemia    Allergies    tetanus toxoid (Short breath)  cefepime (Anaphylaxis)  penicillin (Anaphylaxis)  Avelox (Short breath; Pruritus)  Dilaudid (Short breath)  codeine (Short breath)  aspirin (Short breath)  iodine (Short breath; Swelling)  Valium (Short breath)  shellfish (Anaphylaxis)    Intolerances      MEDICATIONS:  atorvastatin 20 milliGRAM(s) Oral at bedtime  ertapenem  IVPB 1000 milliGRAM(s) IV Intermittent every 24 hours  insulin glargine Injectable (LANTUS) 48 Unit(s) SubCutaneous at bedtime  insulin lispro (ADMELOG) corrective regimen sliding scale   SubCutaneous three times a day before meals  insulin lispro (ADMELOG) corrective regimen sliding scale   SubCutaneous at bedtime  insulin lispro Injectable (ADMELOG) 16 Unit(s) SubCutaneous three times a day with meals  trimethoprim  160 mG/sulfamethoxazole 800 mG 1 Tablet(s) Oral daily      PHYSICAL EXAM:  VITALS: T(C): 36.6 (08-18-23 @ 12:37)  T(F): 97.9 (08-18-23 @ 12:37), Max: 98.8 (08-17-23 @ 23:58)  HR: 75 (08-18-23 @ 12:37) (69 - 77)  BP: 108/59 (08-18-23 @ 12:37) (99/50 - 135/64)  RR:  (18 - 20)  SpO2:  (96% - 100%)  Wt(kg): --  GENERAL: Female laying in bed in NAD  Abdomen: Soft, nontender, non distended  Extremities: Warm, no edema in all 4 exts  NEURO: Alert and able to answer simple questions> sleepy    LABS:  POCT Blood Glucose.: 245 mg/dL (08-18-23 @ 12:27)  POCT Blood Glucose.: 81 mg/dL (08-18-23 @ 08:20)  POCT Blood Glucose.: 203 mg/dL (08-17-23 @ 21:33)  POCT Blood Glucose.: 309 mg/dL (08-17-23 @ 17:35)  POCT Blood Glucose.: 339 mg/dL (08-17-23 @ 12:24)  POCT Blood Glucose.: 288 mg/dL (08-17-23 @ 11:08)  POCT Blood Glucose.: 319 mg/dL (08-17-23 @ 08:20)  POCT Blood Glucose.: 134 mg/dL (08-16-23 @ 21:13)  POCT Blood Glucose.: 220 mg/dL (08-16-23 @ 17:21)  POCT Blood Glucose.: 335 mg/dL (08-16-23 @ 12:55)  POCT Blood Glucose.: 307 mg/dL (08-16-23 @ 08:45)  POCT Blood Glucose.: 117 mg/dL (08-15-23 @ 21:11)  POCT Blood Glucose.: 95 mg/dL (08-15-23 @ 17:41)                            8.1    15.59 )-----------( 215      ( 17 Aug 2023 06:32 )             28.5       08-18    135  |  102  |  23  ----------------------------<  66<L>  6.3<HH>   |  17<L>  |  0.79    eGFR: 78    Ca    8.5      08-18  Mg     2.5     08-18  Phos  3.6     08-18    TPro  6.6  /  Alb  3.3  /  TBili  0.5  /  DBili  x   /  AST  70<H>  /  ALT  26  /  AlkPhos  83  08-18      A1C with Estimated Average Glucose Result: 9.1 % (06-17-23 @ 10:27)      Estimated Average Glucose: 214 mg/dL (06-17-23 @ 10:27)

## 2023-08-18 NOTE — PROGRESS NOTE ADULT - ATTENDING COMMENTS
This is a 72F with h/o bronchiectasis on chronic steroid, s/p surgery, allergic rhinitis,  recurrent PNA, PND, tracheomalacia s/p tracheoplasty, ESBL proteus infections (sputum), T2DM, paroxysmal A-fib on Eliquis, colorectal cancer many years ago status post colostomy presenting with shortness of breath for 3 days. Prior issues with ESBL/Proteus/yeast in sputum culture and was treated with ertapenem/Benadryl/vancomycin/aztreonam and methylprednisolone. She was discharged home with a midline and completed a course of antibiotics ertapenem (last dose 06/25). Recently she was treated for MRSA and pseudomonas in sputum and completed Tobra nebs and doxycycline.    1. Acute on chronic hypoxic RF due to exacerbation of bronchiectasis (on chronic steroid)  2. Recent tracheo-bronchitis due to ESBL proteus/MRSA/pseudomonas  3. Paroxysmal A-fib on Eliquis  4. H/o colorectal cancer, status post colostomy    - Less SOB, feels tired on exertion, no chest pain, WBC 15, O2 sat 99% RA in rest & on ambulation  - Pulmonary plans noted- on bronchodilators, IV solemedrol 40mg/d, inhaled steroid, IV Ertapenem ends today. sputum c/s neg 8/15.   - Hypertonic saline to 7% with bronchodilators followed by use of airway clearance device (patient brought her own from home) and chest PT as tolerated   - Elevated WBC is likely from IV steroid. CT chest shows unchanged infectious/inflammatory small airways disease in the right middle/lower lobes with multifocal small tree-in-bud nodules. Status post ascending aortic and aortic valve replacement, with residual dissection flap better seen on the prior study. Stable indeterminate small left renal nodule and numerous pancreatic cysts.  - LDH 1382, Fungitell sent  - Echo bubble study no shunt, repeat echo ordered to see transvalvular gradient post TAVR  - c/w Eliquis,   - Endocrine f/u plans noted- on basal/bolus insulins FS elevated due to IV steroid  - Her outpatient Pul- Dr Allison is aware.

## 2023-08-18 NOTE — PROGRESS NOTE ADULT - PROBLEM SELECTOR PLAN 4
-on 25-30 lantus at home, and 7-20 mealtime as well  -ISS moderate risk   -hyperglycemic despite escalating insulin dosing due to steroids  -insulin dosing per endo

## 2023-08-18 NOTE — PROGRESS NOTE ADULT - SUBJECTIVE AND OBJECTIVE BOX
SUBJECTIVE/INTERVAL EVENTS: No acute events.    PHYSICAL EXAM:  GENERAL: NAD, lying comfortably in bed   HEAD:  Atraumatic, Normocephalic  EYES: EOMI b/l, PERRLA b/l, conjunctiva and sclera clear  NECK: Supple, No JVD, No LAD   CHEST/LUNG: coarse breath sounds, similar to prior  HEART: Regular rate and rhythm; S1 and S2 present  ABDOMEN: Soft, Nontender, Nondistended; Bowel sounds present  EXTREMITIES:  No clubbing, cyanosis, or edema  NEURO: AAOx3, non-focal   SKIN: No rashes or       Vital Signs Last 24 Hrs  T(C): 36.6 (18 Aug 2023 05:06), Max: 37.1 (17 Aug 2023 23:58)  T(F): 97.9 (18 Aug 2023 05:06), Max: 98.8 (17 Aug 2023 23:58)  HR: 69 (18 Aug 2023 05:06) (68 - 77)  BP: 99/50 (18 Aug 2023 05:06) (99/50 - 135/64)  BP(mean): --  RR: 20 (18 Aug 2023 05:06) (18 - 20)  SpO2: 97% (18 Aug 2023 05:06) (96% - 100%)    Parameters below as of 18 Aug 2023 05:06  Patient On (Oxygen Delivery Method): room air        Consultant(s) Notes Reviewed:  [x] YES  [ ] NO  Care Discussed with Consultants/Other Providers [x] YES  [ ] NO    LABS:                        8.1    15.59 )-----------( 215      ( 17 Aug 2023 06:32 )             28.5                         8.6    13.87 )-----------( 216      ( 16 Aug 2023 06:30 )             30.5         Urinalysis Basic - ( 17 Aug 2023 06:32 )    Color: x / Appearance: x / SG: x / pH: x  Gluc: 300 mg/dL / Ketone: x  / Bili: x / Urobili: x   Blood: x / Protein: x / Nitrite: x   Leuk Esterase: x / RBC: x / WBC x   Sq Epi: x / Non Sq Epi: x / Bacteria: x        RADIOLOGY & ADDITIONAL TESTS:    Imaging Personally Reviewed:  [x] YES  [ ] NO    MEDICATIONS  (STANDING):  albuterol/ipratropium for Nebulization 3 milliLiter(s) Nebulizer every 6 hours  apixaban 5 milliGRAM(s) Oral every 12 hours  atorvastatin 20 milliGRAM(s) Oral at bedtime  buDESOnide    Inhalation Suspension 0.5 milliGRAM(s) Inhalation two times a day  chlorhexidine 4% Liquid 1 Application(s) Topical daily  dextrose 5%. 1000 milliLiter(s) (100 mL/Hr) IV Continuous <Continuous>  dextrose 5%. 1000 milliLiter(s) (50 mL/Hr) IV Continuous <Continuous>  dextrose 50% Injectable 25 Gram(s) IV Push once  dextrose 50% Injectable 12.5 Gram(s) IV Push once  dextrose 50% Injectable 25 Gram(s) IV Push once  ertapenem  IVPB 1000 milliGRAM(s) IV Intermittent every 24 hours  glucagon  Injectable 1 milliGRAM(s) IntraMuscular once  insulin glargine Injectable (LANTUS) 48 Unit(s) SubCutaneous at bedtime  insulin lispro (ADMELOG) corrective regimen sliding scale   SubCutaneous three times a day before meals  insulin lispro (ADMELOG) corrective regimen sliding scale   SubCutaneous at bedtime  insulin lispro Injectable (ADMELOG) 16 Unit(s) SubCutaneous three times a day with meals  methylPREDNISolone sodium succinate Injectable 40 milliGRAM(s) IV Push daily  mexiletine 200 milliGRAM(s) Oral every 8 hours  nystatin    Suspension [id number] Unit(s) Swish and Swallow two times a day  polyethylene glycol 3350 17 Gram(s) Oral two times a day  senna 2 Tablet(s) Oral at bedtime  sodium chloride 7% Inhalation 4 milliLiter(s) Inhalation every 6 hours  tiotropium 2.5 MICROgram(s) Inhaler 2 Puff(s) Inhalation daily  trimethoprim  160 mG/sulfamethoxazole 800 mG 1 Tablet(s) Oral daily    MEDICATIONS  (PRN):  acetaminophen     Tablet .. 650 milliGRAM(s) Oral every 6 hours PRN Temp greater or equal to 38C (100.4F), Mild Pain (1 - 3)  aluminum hydroxide/magnesium hydroxide/simethicone Suspension 30 milliLiter(s) Oral every 4 hours PRN Dyspepsia  dextrose Oral Gel 15 Gram(s) Oral once PRN Blood Glucose LESS THAN 70 milliGRAM(s)/deciliter  diphenhydrAMINE 25 milliGRAM(s) Oral daily PRN Allergy symptoms  melatonin 3 milliGRAM(s) Oral at bedtime PRN Insomnia  ondansetron Injectable 4 milliGRAM(s) IV Push every 8 hours PRN Nausea and/or Vomiting      HEALTH ISSUES - PROBLEM Dx:  Acute exacerbation of bronchiectasis    Acute asthma exacerbation    SIRS without infection or organ dysfunction    Atrial fibrillation  paroxysmal, on eliquis    Diabetes mellitus    Prophylactic measure    Uncontrolled type 2 diabetes mellitus with hyperglycemia    Essential hypertension    Hyperlipidemia     SUBJECTIVE/INTERVAL EVENTS: No acute events. Feeling tired this afternoon.  But reports that cough is not worse, if anything somewhat better since yesterday as bringing up less sputum - cough overall improved since admission but "still I feel something is not right".    PHYSICAL EXAM:  GENERAL: NAD, lying comfortably in bed   HEAD:  Atraumatic, Normocephalic  EYES: EOMI b/l, PERRLA b/l, conjunctiva and sclera clear  NECK: Supple, No JVD, No LAD   CHEST/LUNG: coarse breath sounds, similar to prior  HEART: Regular rate and rhythm; S1 and S2 present  ABDOMEN: Soft, Nontender, Nondistended; Bowel sounds present  EXTREMITIES:  No cyanosis, or edema  NEURO: AAOx3, non-focal   SKIN: No rashes       Vital Signs Last 24 Hrs  T(C): 36.6 (18 Aug 2023 05:06), Max: 37.1 (17 Aug 2023 23:58)  T(F): 97.9 (18 Aug 2023 05:06), Max: 98.8 (17 Aug 2023 23:58)  HR: 69 (18 Aug 2023 05:06) (68 - 77)  BP: 99/50 (18 Aug 2023 05:06) (99/50 - 135/64)  BP(mean): --  RR: 20 (18 Aug 2023 05:06) (18 - 20)  SpO2: 97% (18 Aug 2023 05:06) (96% - 100%)    Parameters below as of 18 Aug 2023 05:06  Patient On (Oxygen Delivery Method): room air        Consultant(s) Notes Reviewed:  [x] YES  [ ] NO  Care Discussed with Consultants/Other Providers [x] YES  [ ] NO    LABS:                        8.1    15.59 )-----------( 215      ( 17 Aug 2023 06:32 )             28.5                         8.6    13.87 )-----------( 216      ( 16 Aug 2023 06:30 )             30.5         Urinalysis Basic - ( 17 Aug 2023 06:32 )    Color: x / Appearance: x / SG: x / pH: x  Gluc: 300 mg/dL / Ketone: x  / Bili: x / Urobili: x   Blood: x / Protein: x / Nitrite: x   Leuk Esterase: x / RBC: x / WBC x   Sq Epi: x / Non Sq Epi: x / Bacteria: x        RADIOLOGY & ADDITIONAL TESTS:    Imaging Personally Reviewed:  [x] YES  [ ] NO    MEDICATIONS  (STANDING):  albuterol/ipratropium for Nebulization 3 milliLiter(s) Nebulizer every 6 hours  apixaban 5 milliGRAM(s) Oral every 12 hours  atorvastatin 20 milliGRAM(s) Oral at bedtime  buDESOnide    Inhalation Suspension 0.5 milliGRAM(s) Inhalation two times a day  chlorhexidine 4% Liquid 1 Application(s) Topical daily  dextrose 5%. 1000 milliLiter(s) (100 mL/Hr) IV Continuous <Continuous>  dextrose 5%. 1000 milliLiter(s) (50 mL/Hr) IV Continuous <Continuous>  dextrose 50% Injectable 25 Gram(s) IV Push once  dextrose 50% Injectable 12.5 Gram(s) IV Push once  dextrose 50% Injectable 25 Gram(s) IV Push once  ertapenem  IVPB 1000 milliGRAM(s) IV Intermittent every 24 hours  glucagon  Injectable 1 milliGRAM(s) IntraMuscular once  insulin glargine Injectable (LANTUS) 48 Unit(s) SubCutaneous at bedtime  insulin lispro (ADMELOG) corrective regimen sliding scale   SubCutaneous three times a day before meals  insulin lispro (ADMELOG) corrective regimen sliding scale   SubCutaneous at bedtime  insulin lispro Injectable (ADMELOG) 16 Unit(s) SubCutaneous three times a day with meals  methylPREDNISolone sodium succinate Injectable 40 milliGRAM(s) IV Push daily  mexiletine 200 milliGRAM(s) Oral every 8 hours  nystatin    Suspension [id number] Unit(s) Swish and Swallow two times a day  polyethylene glycol 3350 17 Gram(s) Oral two times a day  senna 2 Tablet(s) Oral at bedtime  sodium chloride 7% Inhalation 4 milliLiter(s) Inhalation every 6 hours  tiotropium 2.5 MICROgram(s) Inhaler 2 Puff(s) Inhalation daily  trimethoprim  160 mG/sulfamethoxazole 800 mG 1 Tablet(s) Oral daily    MEDICATIONS  (PRN):  acetaminophen     Tablet .. 650 milliGRAM(s) Oral every 6 hours PRN Temp greater or equal to 38C (100.4F), Mild Pain (1 - 3)  aluminum hydroxide/magnesium hydroxide/simethicone Suspension 30 milliLiter(s) Oral every 4 hours PRN Dyspepsia  dextrose Oral Gel 15 Gram(s) Oral once PRN Blood Glucose LESS THAN 70 milliGRAM(s)/deciliter  diphenhydrAMINE 25 milliGRAM(s) Oral daily PRN Allergy symptoms  melatonin 3 milliGRAM(s) Oral at bedtime PRN Insomnia  ondansetron Injectable 4 milliGRAM(s) IV Push every 8 hours PRN Nausea and/or Vomiting      HEALTH ISSUES - PROBLEM Dx:  Acute exacerbation of bronchiectasis    Acute asthma exacerbation    SIRS without infection or organ dysfunction    Atrial fibrillation  paroxysmal, on eliquis    Diabetes mellitus    Prophylactic measure    Uncontrolled type 2 diabetes mellitus with hyperglycemia    Essential hypertension    Hyperlipidemia

## 2023-08-18 NOTE — PROGRESS NOTE ADULT - SUBJECTIVE AND OBJECTIVE BOX
Lázaro Molina, PGY-1  Please contact on Teams    RAYRAY RODRIGUEZ  72y  Female    Reason for Admission:       SUBJECTIVE/INTERVAL EVENTS: No acute events. Pt seen and examined at bedside.    PHYSICAL EXAM:  GENERAL: NAD, lying comfortably in bed   HEAD:  Atraumatic, Normocephalic  EYES: EOMI b/l, PERRLA b/l, conjunctiva and sclera clear  NECK: Supple, No JVD, No LAD   CHEST/LUNG: coarse breath sounds, similar to prior  HEART: Regular rate and rhythm; S1 and S2 present  ABDOMEN: Soft, Nontender, Nondistended; Bowel sounds present  EXTREMITIES:  No clubbing, cyanosis, or edema  NEURO: AAOx3, non-focal   SKIN: No rashes or       Vital Signs Last 24 Hrs  T(C): 36.6 (18 Aug 2023 05:06), Max: 37.1 (17 Aug 2023 23:58)  T(F): 97.9 (18 Aug 2023 05:06), Max: 98.8 (17 Aug 2023 23:58)  HR: 69 (18 Aug 2023 05:06) (68 - 77)  BP: 99/50 (18 Aug 2023 05:06) (99/50 - 135/64)  BP(mean): --  RR: 20 (18 Aug 2023 05:06) (18 - 20)  SpO2: 97% (18 Aug 2023 05:06) (96% - 100%)    Parameters below as of 18 Aug 2023 05:06  Patient On (Oxygen Delivery Method): room air        Consultant(s) Notes Reviewed:  [x] YES  [ ] NO  Care Discussed with Consultants/Other Providers [x] YES  [ ] NO    LABS:                        8.1    15.59 )-----------( 215      ( 17 Aug 2023 06:32 )             28.5                         8.6    13.87 )-----------( 216      ( 16 Aug 2023 06:30 )             30.5         Urinalysis Basic - ( 17 Aug 2023 06:32 )    Color: x / Appearance: x / SG: x / pH: x  Gluc: 300 mg/dL / Ketone: x  / Bili: x / Urobili: x   Blood: x / Protein: x / Nitrite: x   Leuk Esterase: x / RBC: x / WBC x   Sq Epi: x / Non Sq Epi: x / Bacteria: x        RADIOLOGY & ADDITIONAL TESTS:    Imaging Personally Reviewed:  [x] YES  [ ] NO    MEDICATIONS  (STANDING):  albuterol/ipratropium for Nebulization 3 milliLiter(s) Nebulizer every 6 hours  apixaban 5 milliGRAM(s) Oral every 12 hours  atorvastatin 20 milliGRAM(s) Oral at bedtime  buDESOnide    Inhalation Suspension 0.5 milliGRAM(s) Inhalation two times a day  chlorhexidine 4% Liquid 1 Application(s) Topical daily  dextrose 5%. 1000 milliLiter(s) (100 mL/Hr) IV Continuous <Continuous>  dextrose 5%. 1000 milliLiter(s) (50 mL/Hr) IV Continuous <Continuous>  dextrose 50% Injectable 25 Gram(s) IV Push once  dextrose 50% Injectable 12.5 Gram(s) IV Push once  dextrose 50% Injectable 25 Gram(s) IV Push once  ertapenem  IVPB 1000 milliGRAM(s) IV Intermittent every 24 hours  glucagon  Injectable 1 milliGRAM(s) IntraMuscular once  insulin glargine Injectable (LANTUS) 48 Unit(s) SubCutaneous at bedtime  insulin lispro (ADMELOG) corrective regimen sliding scale   SubCutaneous three times a day before meals  insulin lispro (ADMELOG) corrective regimen sliding scale   SubCutaneous at bedtime  insulin lispro Injectable (ADMELOG) 16 Unit(s) SubCutaneous three times a day with meals  methylPREDNISolone sodium succinate Injectable 40 milliGRAM(s) IV Push daily  mexiletine 200 milliGRAM(s) Oral every 8 hours  nystatin    Suspension [id number] Unit(s) Swish and Swallow two times a day  polyethylene glycol 3350 17 Gram(s) Oral two times a day  senna 2 Tablet(s) Oral at bedtime  sodium chloride 7% Inhalation 4 milliLiter(s) Inhalation every 6 hours  tiotropium 2.5 MICROgram(s) Inhaler 2 Puff(s) Inhalation daily  trimethoprim  160 mG/sulfamethoxazole 800 mG 1 Tablet(s) Oral daily    MEDICATIONS  (PRN):  acetaminophen     Tablet .. 650 milliGRAM(s) Oral every 6 hours PRN Temp greater or equal to 38C (100.4F), Mild Pain (1 - 3)  aluminum hydroxide/magnesium hydroxide/simethicone Suspension 30 milliLiter(s) Oral every 4 hours PRN Dyspepsia  dextrose Oral Gel 15 Gram(s) Oral once PRN Blood Glucose LESS THAN 70 milliGRAM(s)/deciliter  diphenhydrAMINE 25 milliGRAM(s) Oral daily PRN Allergy symptoms  melatonin 3 milliGRAM(s) Oral at bedtime PRN Insomnia  ondansetron Injectable 4 milliGRAM(s) IV Push every 8 hours PRN Nausea and/or Vomiting      HEALTH ISSUES - PROBLEM Dx:  Acute exacerbation of bronchiectasis    Acute asthma exacerbation    SIRS without infection or organ dysfunction    Atrial fibrillation  paroxysmal, on eliquis    Diabetes mellitus    Prophylactic measure    Uncontrolled type 2 diabetes mellitus with hyperglycemia    Essential hypertension    Hyperlipidemia

## 2023-08-18 NOTE — PROGRESS NOTE ADULT - ASSESSMENT
71 yo PMHx  asthma on chronic steroids, bronchiectasis s/p surgery, allergic rhinitis,  recurrent PNA, PND, tracheomalacia s/p tracheoplasty, ESBL proteus infections (sputum),  diabetes, paroxysmal A-fib on Eliquis, colorectal cancer many years ago status post colostomy presenting with concern for shortness of breath. Prior issues with ESBL/Proteus/yeast in sputum culture and was treated with ertapenem/Benadryl/vancomycin/aztreonam and methylprednisolone.   She was discharged home with a midline and completed a course of antibiotics ertapenem (last dose 06/25) PT now readmitted with severe dyspnea and reports that having had asthma for 61 years she feels like she needs an antibiotic. Reports not tolerating meropenem. Patient follows with Dr Allison who knows this complicated pt very well  Noted  pt has PCN, cefepime allergies    RECOMMENDATIONS  -no evidence of airspace disease so suspect likely Bronchiectasis exacerbation  -recent sputums with ESBL proteus so having tolerated ertapenem and was restarted   -Repeat CXR with no pneumonia     -8/15 Sputum culture with normal resp val   -8/14 Sputum AFB smear negative -f/u culture to rule out DIEUDONNE  -8/17 CF sputum culture with mod Staph aureus and normal resp val    -leukocytosis trend noted - suspect may be reactive in setting of steroids   - no new complaints or focal findings on exam; remains afebrile, reports feels slightly better today but still feels "something is not right"    Pulmonary following - planning to change to po steroid 8/19  Follow AFB, fungal sputum cultures   Follow up sputum culture for S.aureus sensitivities   Will start on linezolid for now pending above and monitor for improvement   Continue ertapenem (8/12--)  - can complete total 7d course today 8/18  Supportive care, chest PT, neb treatments   Monitor temps/CBC    Over the weekend Dr. Priyanka Nguyen will be covering for our group. If you have any questions, concerns or new micro data, please reach out to them at [phone]    Tello Fernandez M.D.  [facility], Division of Infectious Diseases  [phone]  After 5pm on weekdays and all day on weekends - please call [phone] 73 yo PMHx  asthma on chronic steroids, bronchiectasis s/p surgery, allergic rhinitis,  recurrent PNA, PND, tracheomalacia s/p tracheoplasty, ESBL proteus infections (sputum),  diabetes, paroxysmal A-fib on Eliquis, colorectal cancer many years ago status post colostomy presenting with concern for shortness of breath. Prior issues with ESBL/Proteus/yeast in sputum culture and was treated with ertapenem/Benadryl/vancomycin/aztreonam and methylprednisolone.   She was discharged home with a midline and completed a course of antibiotics ertapenem (last dose 06/25) PT now readmitted with severe dyspnea and reports that having had asthma for 61 years she feels like she needs an antibiotic. Reports not tolerating meropenem. Patient follows with Dr Allison who knows this complicated pt very well  Noted  pt has PCN, cefepime allergies    RECOMMENDATIONS  -no evidence of airspace disease so suspect likely Bronchiectasis exacerbation  -recent sputums with ESBL proteus so having tolerated ertapenem and was restarted   -Repeat CXR with no pneumonia   -Fungitell negative     -8/15 Sputum culture with normal resp val   -8/14 Sputum AFB smear negative -f/u culture to rule out DIEUDONNE  -8/17 CF sputum culture with mod Staph aureus and normal resp val    -leukocytosis trend noted - suspect may be reactive in setting of steroids   - no new complaints or focal findings on exam; remains afebrile, reports feels slightly better today but still feels "something is not right"    Pulmonary following - planning to change to po steroid 8/19  Follow AFB, fungal sputum cultures   Follow up sputum culture for S.aureus sensitivities   Will start on linezolid for now pending above and monitor for improvement   Continue ertapenem (8/12--)  - can complete total 7d course today 8/18  Supportive care, chest PT, neb treatments   Monitor temps/CBC    Over the weekend Dr. Priyanka Nguyen will be covering for our group. If you have any questions, concerns or new micro data, please reach out to them at [phone]    Tello Fernandez M.D.  \[facility]\"", Division of Infectious Diseases  [phone]  After 5pm on weekdays and all day on weekends - please call [phone]

## 2023-08-18 NOTE — PROGRESS NOTE ADULT - ASSESSMENT
71 yo F with PMHx  asthma on chronic steroids, bronchiectasis, allergic rhinitis,  recurrent PNA, PND, tracheomalacia s/p tracheoplasty, tracheobronchomalacia, ESBL proteus infections (sputum),  diabetes, paroxysmal A-fib on Eliquis, colorectal cancer many years ago status post colostomy, aortic dissection s/p ascending aorta repair presenting with acute on chronic shortness of breath in the setting of recent completion of abx and tapering of her steroids as an outpatient. She reports increased cough/sputum production, worsened dyspnea/wheezing.  RVP (-). Procal 0.13. CTA neg for PE but showing new mucous secretions in BI, mild RLL BE, RML/RLL TIB opacities and some mild central bilateral GGOs.  Had suspected asthma exacerbation/acute bronchitis but patient reports quality of symptoms different from prior exacerbations and unable to pinpoint difference- noted desaturation when ambulating to bathroom but otherwise normal O2Sat at rest. CXR without overt parenchymal abnormalities/infilatrates to suggest evolving PNA. No role for bronchoscopy at this time given treatment for asthma exacerbation, TBM and lack of overt CXR/CT findings - will continue to try to obtain resp culture noninvasively. Resp culture with normal val but had some epith cells.  AFB smear negative.  Etiology of her persistent dyspnea not clear, patient is not obviously dyspneic nor hypoxic. Cough apparent only with deep breathing likely a limitation of her TBM.    Recommend  - send repeat AFB smear/culture as well as fungal culture; check serum Galactomannan  - continue hypertonic saline to 7% WITH albuterol neb q6h standing, followed by use of airway clearance device (patient brought her own from home)  - chest PT as tolerated   - cont Budesonide neb should be on 0.5mg q12h   - cont methylprednisolone 40 mg daily  - ID following, recs appreciated, currently on Ertapenem to complete 8/18, of note not covering for Pseudomonas or MRSA which has been isolated on previous cultures albeit MRSA screen neg this admit  - repeat limited echo with doppler for better evaluation of TAVR not performed; but bubble study negative, no e/o shunt  - PT/OT, OOB to chair as much as possible    Radha Desai MD         71 yo F with PMHx  asthma on chronic steroids, bronchiectasis, allergic rhinitis,  recurrent PNA, PND, tracheomalacia s/p tracheoplasty, tracheobronchomalacia, ESBL proteus infections (sputum),  diabetes, paroxysmal A-fib on Eliquis, colorectal cancer many years ago status post colostomy, aortic dissection s/p ascending aorta repair presenting with acute on chronic shortness of breath in the setting of recent completion of abx and tapering of her steroids as an outpatient. She reports increased cough/sputum production, worsened dyspnea/wheezing.  RVP (-). Procal 0.13. CTA neg for PE but showing new mucous secretions in BI, mild RLL BE, RML/RLL TIB opacities and some mild central bilateral GGOs.  Had suspected asthma exacerbation/acute bronchitis but patient reports quality of symptoms different from prior exacerbations and unable to pinpoint difference- noted desaturation when ambulating to bathroom but otherwise normal O2Sat at rest. CXR without overt parenchymal abnormalities/infiltrates to suggest evolving PNA. No role for bronchoscopy at this time given treatment for asthma exacerbation, TBM and lack of overt CXR/CT findings - will continue to try to obtain resp culture noninvasively. Resp culture with normal val but had some epith cells.  AFB smear negative.  Etiology of her persistent dyspnea not clear, patient is not obviously dyspneic nor hypoxic. Cough apparent only with deep breathing likely a limitation of her TBM.  Bubble study negative.    Recommend  - send repeat AFB smear/culture, follow-up fungal culture; check serum Galactomannan  - continue hypertonic saline to 7% WITH albuterol neb q6h standing, followed by use of airway clearance device (patient brought her own from home)  - cont Budesonide neb should be on 0.5mg q12h   - cont methylprednisolone 40 mg daily, change to Prednisone 40mg 8/19  - ID following, recs appreciated, currently on Ertapenem to complete today 8/18  - repeat limited echo with doppler for better evaluation of TAVR to be performed and determine if AV malfunction is contributing to patient's current symptomatology  - Continue Bactrim for PJP ppx  - PT/OT, OOB to chair as much as possible    Radha Desai MD

## 2023-08-18 NOTE — PROGRESS NOTE ADULT - NUTRITIONAL ASSESSMENT
Diet, Regular:   Consistent Carbohydrate {Evening Snack} (CSTCHOSN)  Supplement Feeding Modality:  Oral  Glucerna Shake Cans or Servings Per Day:  3       Frequency:  Three Times a day (08-18-23 @ 11:56) [Active]      Needs RD consult

## 2023-08-18 NOTE — PROGRESS NOTE ADULT - PROBLEM SELECTOR PLAN 1
Test BG ac and hs and 2am while on steroids  Decrease Lantus dose to 40 units qhs for now  Monitor on Admelog 16 units qac for now. If BG <100 today decrease dose to 12 units. Will follow  C/w Mod correction scale qac + bedtime  Will adjust as needed. Expecting BG to improve once on lower Prednisone doses   Discharge:  Will be determined according to BG/insulin needs/PO intake and steroid therapy at time of discharge.  Plan to d/c on basal bolus. Doses TBD  Outpt. endo follow-up. Dr Saavedra  Outpt. optho, podiatry, micro/cr  Make sure pt has Rx for all DM supplies and insulin/ DM meds.

## 2023-08-18 NOTE — PROGRESS NOTE ADULT - PROBLEM SELECTOR PLAN 1
- CT chest showed increased bronchial secretions, on RA saturating well, speaking in full sentences, unclear trigger, likely due to infection, or inflammation.     - Sputum culture normal val, AFB smear negative  - urine legionella, strep neg  - RVP negative  - hypertonic saline 7% and albuterol neb q6h standing, followed by use of airway clearance device (patient brought her own from home)  - chest PT as tolerated   - budesonide 0.5mg neb q12h standing   - tapered to solumedrol 40 mg IV daily  - pulm following, appreciate recs  - echo: hyperdynamic LV, likely normal RV function

## 2023-08-18 NOTE — PROGRESS NOTE ADULT - SUBJECTIVE AND OBJECTIVE BOX
[facility] DIVISION OF INFECTIOUS DISEASES  GISSELL Uriostegui Y. Patel, S. Shah, G. Terrell  [phone]  ([phone] - weekdays after 5pm and weekends)    Name: RAYRAY RODRIGUEZ  Age/Gender: 72y Female  MRN: [id number]    Interval History:  Patient seen and examined this morning.   Feels tired but slightly better than yesterday   Cough about the same, less productive.   Denies fever, chills, chest pain.  Notes reviewed. Afebrile.     Allergies: tetanus toxoid (Short breath)  cefepime (Anaphylaxis)  penicillin (Anaphylaxis)  Avelox (Short breath; Pruritus)  Dilaudid (Short breath)  codeine (Short breath)  aspirin (Short breath)  iodine (Short breath; Swelling)  Valium (Short breath)  shellfish (Anaphylaxis)    Objective:  Vitals:   T(F): 97.9 (08-18-23 @ 12:37), Max: 98.8 (08-17-23 @ 23:58)  HR: 75 (08-18-23 @ 12:37) (69 - 77)  BP: 108/59 (08-18-23 @ 12:37) (99/50 - 135/64)  RR: 20 (08-18-23 @ 12:37) (18 - 20)  SpO2: 97% (08-18-23 @ 12:37) (96% - 100%)  Physical Examination:  General: no acute distress, RA  HEENT: NC/AT, EOMI, anicteric, neck supple  Cardio: S1, S2 present, normal rate  Resp: coarse breath sounds bilaterally  Abd: soft, NT, ND, + BS  Neuro: AAOx3, no obvious focal deficits  Ext: no edema, no cyanosis, moving extremities  Skin: warm, dry, no visible rash  Psych: appropriate affect and mood for situation  Lines: PIV    Laboratory Studies:  CBC:                       8.1    15.59 )-----------( 215      ( 17 Aug 2023 06:32 )             28.5     WBC Trend:  15.59 08-17-23 @ 06:32  13.87 08-16-23 @ 06:30  20.91 08-15-23 @ 07:04  19.37 08-14-23 @ 07:25  16.73 08-13-23 @ 07:21  9.05 08-12-23 @ 07:17  10.56 08-11-23 @ 16:05    CMP: 08-18    135  |  102  |  23  ----------------------------<  66<L>  6.3<HH>   |  17<L>  |  0.79    Ca    8.5      18 Aug 2023 10:39  Phos  3.6     08-18  Mg     2.5     08-18    TPro  6.6  /  Alb  3.3  /  TBili  0.5  /  DBili  x   /  AST  70<H>  /  ALT  26  /  AlkPhos  83  08-18    Creatinine: 0.79 mg/dL (08-18-23 @ 10:39)  Creatinine: 0.72 mg/dL (08-17-23 @ 06:32)  Creatinine: 0.73 mg/dL (08-16-23 @ 06:31)  Creatinine: 0.71 mg/dL (08-15-23 @ 07:02)  Creatinine: 0.76 mg/dL (08-14-23 @ 07:21)  Creatinine: 0.68 mg/dL (08-13-23 @ 07:22)  Creatinine: 0.67 mg/dL (08-12-23 @ 07:21)  Creatinine: 0.80 mg/dL (08-11-23 @ 16:05)    LIVER FUNCTIONS - ( 18 Aug 2023 10:39 )  Alb: 3.3 g/dL / Pro: 6.6 g/dL / ALK PHOS: 83 U/L / ALT: 26 U/L / AST: 70 U/L / GGT: x           Microbiology: reviewed   Culture - Sputum, Cystic Fibrosis (collected 08-17-23 @ 01:18)  Source: .Sputum Sputum  Gram Stain (08-17-23 @ 10:15):    Rare Squamous epithelial cells per low power field    No polymorphonuclear leukocytes per low power field    Rare Gram positive cocci in pairs per oil power field  Preliminary Report (08-18-23 @ 13:51):    Moderate Staphylococcus aureus    Numerous Normal Respiratory Jessica present    Culture - Sputum (collected 08-15-23 @ 12:49)  Source: .Sputum Sputum  Gram Stain (08-15-23 @ 21:07):    Rare Squamous epithelial cells per low power field    Rare polymorphonuclear leukocytes per low power field    Rare Yeast like cells per oil power field  Final Report (08-17-23 @ 18:08):    Normal Respiratory Jessica present    Culture - Acid Fast - Sputum w/Smear (collected 08-14-23 @ 13:11)  Source: .Sputum Sputum    Culture - Blood (collected 08-11-23 @ 16:00)  Source: .Blood Blood-Peripheral  Final Report (08-16-23 @ 20:00):    No growth at 5 days    Culture - Blood (collected 08-11-23 @ 15:15)  Source: .Blood Blood-Peripheral  Final Report (08-16-23 @ 20:00):    No growth at 5 days    SARS-CoV-2 Result: NotDetec (11 Aug 2023 16:06)    Radiology: reviewed     Medications:  acetaminophen     Tablet .. 650 milliGRAM(s) Oral every 6 hours PRN  albuterol/ipratropium for Nebulization 3 milliLiter(s) Nebulizer every 6 hours  aluminum hydroxide/magnesium hydroxide/simethicone Suspension 30 milliLiter(s) Oral every 4 hours PRN  apixaban 5 milliGRAM(s) Oral every 12 hours  atorvastatin 20 milliGRAM(s) Oral at bedtime  buDESOnide    Inhalation Suspension 0.5 milliGRAM(s) Inhalation two times a day  chlorhexidine 4% Liquid 1 Application(s) Topical daily  dextrose 5%. 1000 milliLiter(s) IV Continuous <Continuous>  dextrose 5%. 1000 milliLiter(s) IV Continuous <Continuous>  dextrose 50% Injectable 25 Gram(s) IV Push once  dextrose 50% Injectable 12.5 Gram(s) IV Push once  dextrose 50% Injectable 25 Gram(s) IV Push once  dextrose Oral Gel 15 Gram(s) Oral once PRN  diphenhydrAMINE 25 milliGRAM(s) Oral daily PRN  ertapenem  IVPB 1000 milliGRAM(s) IV Intermittent every 24 hours  glucagon  Injectable 1 milliGRAM(s) IntraMuscular once  insulin glargine Injectable (LANTUS) 48 Unit(s) SubCutaneous at bedtime  insulin lispro (ADMELOG) corrective regimen sliding scale   SubCutaneous three times a day before meals  insulin lispro (ADMELOG) corrective regimen sliding scale   SubCutaneous at bedtime  insulin lispro Injectable (ADMELOG) 16 Unit(s) SubCutaneous three times a day with meals  melatonin 3 milliGRAM(s) Oral at bedtime PRN  mexiletine 200 milliGRAM(s) Oral every 8 hours  nystatin    Suspension 293679 Unit(s) Swish and Swallow two times a day  ondansetron Injectable 4 milliGRAM(s) IV Push every 8 hours PRN  pantoprazole    Tablet 40 milliGRAM(s) Oral before breakfast  polyethylene glycol 3350 17 Gram(s) Oral two times a day  senna 2 Tablet(s) Oral at bedtime  sodium chloride 7% Inhalation 4 milliLiter(s) Inhalation every 6 hours  tiotropium 2.5 MICROgram(s) Inhaler 2 Puff(s) Inhalation daily  trimethoprim  160 mG/sulfamethoxazole 800 mG 1 Tablet(s) Oral daily    Current Antimicrobials:  ertapenem  IVPB 1000 milliGRAM(s) IV Intermittent every 24 hours  nystatin    Suspension 311121 Unit(s) Swish and Swallow two times a day  trimethoprim  160 mG/sulfamethoxazole 800 mG 1 Tablet(s) Oral daily    Prior/Completed Antimicrobials:  ertapenem  IVPB

## 2023-08-18 NOTE — PROGRESS NOTE ADULT - PROBLEM SELECTOR PLAN 3
LDL goal <70 due to DM  Pt LDL N/A  Order fasting lipid if not recently done  Statin as noted above   Manage per primary team   F/u levels as out pt

## 2023-08-19 LAB
-  AMPICILLIN/SULBACTAM: SIGNIFICANT CHANGE UP
-  CEFAZOLIN: SIGNIFICANT CHANGE UP
-  CLINDAMYCIN: SIGNIFICANT CHANGE UP
-  ERYTHROMYCIN: SIGNIFICANT CHANGE UP
-  GENTAMICIN: SIGNIFICANT CHANGE UP
-  LINEZOLID: SIGNIFICANT CHANGE UP
-  OXACILLIN: SIGNIFICANT CHANGE UP
-  PENICILLIN: SIGNIFICANT CHANGE UP
-  RIFAMPIN: SIGNIFICANT CHANGE UP
-  TETRACYCLINE: SIGNIFICANT CHANGE UP
-  TRIMETHOPRIM/SULFAMETHOXAZOLE: SIGNIFICANT CHANGE UP
-  VANCOMYCIN: SIGNIFICANT CHANGE UP
ALBUMIN SERPL ELPH-MCNC: 3.6 G/DL — SIGNIFICANT CHANGE UP (ref 3.3–5)
ALP SERPL-CCNC: 75 U/L — SIGNIFICANT CHANGE UP (ref 40–120)
ALT FLD-CCNC: 25 U/L — SIGNIFICANT CHANGE UP (ref 10–45)
ANION GAP SERPL CALC-SCNC: 11 MMOL/L — SIGNIFICANT CHANGE UP (ref 5–17)
ANISOCYTOSIS BLD QL: SIGNIFICANT CHANGE UP
AST SERPL-CCNC: 51 U/L — HIGH (ref 10–40)
BASOPHILS # BLD AUTO: 0 K/UL — SIGNIFICANT CHANGE UP (ref 0–0.2)
BASOPHILS NFR BLD AUTO: 0 % — SIGNIFICANT CHANGE UP (ref 0–2)
BILIRUB SERPL-MCNC: 0.7 MG/DL — SIGNIFICANT CHANGE UP (ref 0.2–1.2)
BUN SERPL-MCNC: 24 MG/DL — HIGH (ref 7–23)
CALCIUM SERPL-MCNC: 8.6 MG/DL — SIGNIFICANT CHANGE UP (ref 8.4–10.5)
CHLORIDE SERPL-SCNC: 104 MMOL/L — SIGNIFICANT CHANGE UP (ref 96–108)
CO2 SERPL-SCNC: 25 MMOL/L — SIGNIFICANT CHANGE UP (ref 22–31)
CREAT SERPL-MCNC: 0.65 MG/DL — SIGNIFICANT CHANGE UP (ref 0.5–1.3)
EGFR: 92 ML/MIN/1.73M2 — SIGNIFICANT CHANGE UP
ELLIPTOCYTES BLD QL SMEAR: SLIGHT — SIGNIFICANT CHANGE UP
EOSINOPHIL # BLD AUTO: 0 K/UL — SIGNIFICANT CHANGE UP (ref 0–0.5)
EOSINOPHIL NFR BLD AUTO: 0 % — SIGNIFICANT CHANGE UP (ref 0–6)
GLUCOSE BLDC GLUCOMTR-MCNC: 190 MG/DL — HIGH (ref 70–99)
GLUCOSE BLDC GLUCOMTR-MCNC: 287 MG/DL — HIGH (ref 70–99)
GLUCOSE BLDC GLUCOMTR-MCNC: 83 MG/DL — SIGNIFICANT CHANGE UP (ref 70–99)
GLUCOSE BLDC GLUCOMTR-MCNC: 91 MG/DL — SIGNIFICANT CHANGE UP (ref 70–99)
GLUCOSE SERPL-MCNC: 90 MG/DL — SIGNIFICANT CHANGE UP (ref 70–99)
GRAM STN FLD: SIGNIFICANT CHANGE UP
HCT VFR BLD CALC: 31 % — LOW (ref 34.5–45)
HGB BLD-MCNC: 8.8 G/DL — LOW (ref 11.5–15.5)
LYMPHOCYTES # BLD AUTO: 0.25 K/UL — LOW (ref 1–3.3)
LYMPHOCYTES # BLD AUTO: 1.8 % — LOW (ref 13–44)
MACROCYTES BLD QL: SLIGHT — SIGNIFICANT CHANGE UP
MAGNESIUM SERPL-MCNC: 2.4 MG/DL — SIGNIFICANT CHANGE UP (ref 1.6–2.6)
MANUAL SMEAR VERIFICATION: SIGNIFICANT CHANGE UP
MCHC RBC-ENTMCNC: 22.6 PG — LOW (ref 27–34)
MCHC RBC-ENTMCNC: 28.4 GM/DL — LOW (ref 32–36)
MCV RBC AUTO: 79.5 FL — LOW (ref 80–100)
METHOD TYPE: SIGNIFICANT CHANGE UP
MICROCYTES BLD QL: SIGNIFICANT CHANGE UP
MONOCYTES # BLD AUTO: 0.38 K/UL — SIGNIFICANT CHANGE UP (ref 0–0.9)
MONOCYTES NFR BLD AUTO: 2.7 % — SIGNIFICANT CHANGE UP (ref 2–14)
MYELOCYTES NFR BLD: 0.9 % — HIGH (ref 0–0)
NEUTROPHILS # BLD AUTO: 13.37 K/UL — HIGH (ref 1.8–7.4)
NEUTROPHILS NFR BLD AUTO: 94.6 % — HIGH (ref 43–77)
NRBC # BLD: 7 /100 — HIGH (ref 0–0)
OVALOCYTES BLD QL SMEAR: SLIGHT — SIGNIFICANT CHANGE UP
PHOSPHATE SERPL-MCNC: 3.4 MG/DL — SIGNIFICANT CHANGE UP (ref 2.5–4.5)
PLAT MORPH BLD: ABNORMAL
PLATELET # BLD AUTO: 215 K/UL — SIGNIFICANT CHANGE UP (ref 150–400)
POIKILOCYTOSIS BLD QL AUTO: SIGNIFICANT CHANGE UP
POLYCHROMASIA BLD QL SMEAR: SIGNIFICANT CHANGE UP
POTASSIUM SERPL-MCNC: 5.3 MMOL/L — SIGNIFICANT CHANGE UP (ref 3.5–5.3)
POTASSIUM SERPL-SCNC: 5.3 MMOL/L — SIGNIFICANT CHANGE UP (ref 3.5–5.3)
PROT SERPL-MCNC: 5.7 G/DL — LOW (ref 6–8.3)
RBC # BLD: 3.9 M/UL — SIGNIFICANT CHANGE UP (ref 3.8–5.2)
RBC # FLD: 26.5 % — HIGH (ref 10.3–14.5)
RBC BLD AUTO: ABNORMAL
SCHISTOCYTES BLD QL AUTO: SIGNIFICANT CHANGE UP
SODIUM SERPL-SCNC: 140 MMOL/L — SIGNIFICANT CHANGE UP (ref 135–145)
SPECIMEN SOURCE: SIGNIFICANT CHANGE UP
TARGETS BLD QL SMEAR: SLIGHT — SIGNIFICANT CHANGE UP
WBC # BLD: 14.13 K/UL — HIGH (ref 3.8–10.5)
WBC # FLD AUTO: 14.13 K/UL — HIGH (ref 3.8–10.5)

## 2023-08-19 PROCEDURE — 99233 SBSQ HOSP IP/OBS HIGH 50: CPT | Mod: FS

## 2023-08-19 RX ORDER — INSULIN GLARGINE 100 [IU]/ML
36 INJECTION, SOLUTION SUBCUTANEOUS AT BEDTIME
Refills: 0 | Status: DISCONTINUED | OUTPATIENT
Start: 2023-08-19 | End: 2023-08-21

## 2023-08-19 RX ADMIN — Medication 0.5 MILLIGRAM(S): at 06:41

## 2023-08-19 RX ADMIN — Medication 500000 UNIT(S): at 06:46

## 2023-08-19 RX ADMIN — APIXABAN 5 MILLIGRAM(S): 2.5 TABLET, FILM COATED ORAL at 06:39

## 2023-08-19 RX ADMIN — Medication 3 MILLILITER(S): at 13:03

## 2023-08-19 RX ADMIN — MEXILETINE HYDROCHLORIDE 200 MILLIGRAM(S): 150 CAPSULE ORAL at 06:42

## 2023-08-19 RX ADMIN — TIOTROPIUM BROMIDE 2 PUFF(S): 18 CAPSULE ORAL; RESPIRATORY (INHALATION) at 13:05

## 2023-08-19 RX ADMIN — Medication 16 UNIT(S): at 17:52

## 2023-08-19 RX ADMIN — SODIUM CHLORIDE 4 MILLILITER(S): 9 INJECTION INTRAMUSCULAR; INTRAVENOUS; SUBCUTANEOUS at 17:55

## 2023-08-19 RX ADMIN — POLYETHYLENE GLYCOL 3350 17 GRAM(S): 17 POWDER, FOR SOLUTION ORAL at 06:41

## 2023-08-19 RX ADMIN — SENNA PLUS 2 TABLET(S): 8.6 TABLET ORAL at 22:13

## 2023-08-19 RX ADMIN — PANTOPRAZOLE SODIUM 40 MILLIGRAM(S): 20 TABLET, DELAYED RELEASE ORAL at 06:46

## 2023-08-19 RX ADMIN — Medication 3 MILLILITER(S): at 23:21

## 2023-08-19 RX ADMIN — CHLORHEXIDINE GLUCONATE 1 APPLICATION(S): 213 SOLUTION TOPICAL at 13:08

## 2023-08-19 RX ADMIN — POLYETHYLENE GLYCOL 3350 17 GRAM(S): 17 POWDER, FOR SOLUTION ORAL at 17:54

## 2023-08-19 RX ADMIN — MEXILETINE HYDROCHLORIDE 200 MILLIGRAM(S): 150 CAPSULE ORAL at 13:04

## 2023-08-19 RX ADMIN — Medication 40 MILLIGRAM(S): at 06:46

## 2023-08-19 RX ADMIN — Medication 16 UNIT(S): at 13:04

## 2023-08-19 RX ADMIN — Medication 3 MILLILITER(S): at 06:37

## 2023-08-19 RX ADMIN — ATORVASTATIN CALCIUM 20 MILLIGRAM(S): 80 TABLET, FILM COATED ORAL at 22:09

## 2023-08-19 RX ADMIN — Medication 500000 UNIT(S): at 17:54

## 2023-08-19 RX ADMIN — APIXABAN 5 MILLIGRAM(S): 2.5 TABLET, FILM COATED ORAL at 17:53

## 2023-08-19 RX ADMIN — SODIUM CHLORIDE 4 MILLILITER(S): 9 INJECTION INTRAMUSCULAR; INTRAVENOUS; SUBCUTANEOUS at 23:20

## 2023-08-19 RX ADMIN — Medication 16 UNIT(S): at 09:46

## 2023-08-19 RX ADMIN — SODIUM CHLORIDE 4 MILLILITER(S): 9 INJECTION INTRAMUSCULAR; INTRAVENOUS; SUBCUTANEOUS at 13:03

## 2023-08-19 RX ADMIN — SODIUM CHLORIDE 4 MILLILITER(S): 9 INJECTION INTRAMUSCULAR; INTRAVENOUS; SUBCUTANEOUS at 06:41

## 2023-08-19 RX ADMIN — LINEZOLID 600 MILLIGRAM(S): 600 INJECTION, SOLUTION INTRAVENOUS at 20:50

## 2023-08-19 RX ADMIN — MEXILETINE HYDROCHLORIDE 200 MILLIGRAM(S): 150 CAPSULE ORAL at 22:10

## 2023-08-19 RX ADMIN — Medication 1 TABLET(S): at 13:08

## 2023-08-19 RX ADMIN — Medication 3 MILLILITER(S): at 17:54

## 2023-08-19 RX ADMIN — INSULIN GLARGINE 36 UNIT(S): 100 INJECTION, SOLUTION SUBCUTANEOUS at 22:08

## 2023-08-19 RX ADMIN — Medication 0.5 MILLIGRAM(S): at 17:54

## 2023-08-19 RX ADMIN — Medication 6: at 17:51

## 2023-08-19 RX ADMIN — LINEZOLID 600 MILLIGRAM(S): 600 INJECTION, SOLUTION INTRAVENOUS at 08:33

## 2023-08-19 NOTE — PROGRESS NOTE ADULT - ATTENDING COMMENTS
This is a 72F with h/o bronchiectasis on chronic steroid, s/p surgery, allergic rhinitis,  recurrent PNA, PND, tracheomalacia s/p tracheoplasty, ESBL proteus infections (sputum), T2DM, paroxysmal A-fib on Eliquis, colorectal cancer many years ago status post colostomy presenting with shortness of breath for 3 days. Prior issues with ESBL/Proteus/yeast in sputum culture and was treated with ertapenem/Benadryl/vancomycin/aztreonam and methylprednisolone. She was discharged home with a midline and completed a course of antibiotics ertapenem (last dose 06/25). Recently she was treated for MRSA and pseudomonas in sputum and completed Tobra nebs and doxycycline.    1. Acute on chronic hypoxic RF due to exacerbation of bronchiectasis (on chronic steroid)  2. Recent tracheo-bronchitis due to ESBL proteus/MRSA/pseudomonas  3. Paroxysmal A-fib on Eliquis  4. H/o colorectal cancer, status post colostomy    - Less SOB, afebrile, no chest pain, WBC 15 yesterday, O2 sat 99% RA in rest & on ambulation  - repeat sputum c/s grew staph, now on Zyvox  - Pulmonary plans noted- on bronchodilators, IV solumedrol 40mg/d, inhaled steroid, s/p IV Ertapenem   - Hypertonic saline to 7% with bronchodilators followed by use of airway clearance device (patient brought her own from home) and chest PT as tolerated   - CT chest shows unchanged infectious/inflammatory small airways disease in the right middle/lower lobes with multifocal small tree-in-bud nodules. Status post ascending aortic and aortic valve replacement, with residual dissection flap better seen on the prior study. Stable indeterminate small left renal nodule and numerous pancreatic cysts.  - LDH 1382, Fungitell neg  - Echo bubble study no shunt, repeat echo ordered to see transvalvular gradient post TAVR  - c/w Eliquis,   - Endocrine f/u plans noted- on basal/bolus insulins FS elevated due to IV steroid  - Her outpatient Pul- Dr Allison is aware.

## 2023-08-19 NOTE — PROGRESS NOTE ADULT - SUBJECTIVE AND OBJECTIVE BOX
***************************************************************  Eulalio Robertson) [facility] PGY2  Internal Medicine   ***************************************************************    RAYRAY RODRIGUEZ  72y  MRN: [id number]    Patient is a 72y old  Female who presents with a chief complaint of sob (18 Aug 2023 17:04)      Subjective: no events ON. Denies fever, CP, SOB, abn pain, N/V, dysuria. Tolerating diet.      MEDICATIONS  (STANDING):  albuterol/ipratropium for Nebulization 3 milliLiter(s) Nebulizer every 6 hours  apixaban 5 milliGRAM(s) Oral every 12 hours  atorvastatin 20 milliGRAM(s) Oral at bedtime  buDESOnide    Inhalation Suspension 0.5 milliGRAM(s) Inhalation two times a day  chlorhexidine 4% Liquid 1 Application(s) Topical daily  dextrose 5%. 1000 milliLiter(s) (50 mL/Hr) IV Continuous <Continuous>  dextrose 5%. 1000 milliLiter(s) (100 mL/Hr) IV Continuous <Continuous>  dextrose 50% Injectable 25 Gram(s) IV Push once  dextrose 50% Injectable 12.5 Gram(s) IV Push once  dextrose 50% Injectable 25 Gram(s) IV Push once  glucagon  Injectable 1 milliGRAM(s) IntraMuscular once  insulin glargine Injectable (LANTUS) 40 Unit(s) SubCutaneous at bedtime  insulin lispro (ADMELOG) corrective regimen sliding scale   SubCutaneous three times a day before meals  insulin lispro (ADMELOG) corrective regimen sliding scale   SubCutaneous at bedtime  insulin lispro Injectable (ADMELOG) 16 Unit(s) SubCutaneous three times a day with meals  linezolid    Tablet 600 milliGRAM(s) Oral every 12 hours  mexiletine 200 milliGRAM(s) Oral every 8 hours  nystatin    Suspension 242005 Unit(s) Swish and Swallow two times a day  pantoprazole    Tablet 40 milliGRAM(s) Oral before breakfast  polyethylene glycol 3350 17 Gram(s) Oral two times a day  predniSONE   Tablet 40 milliGRAM(s) Oral daily  senna 2 Tablet(s) Oral at bedtime  sodium chloride 7% Inhalation 4 milliLiter(s) Inhalation every 6 hours  tiotropium 2.5 MICROgram(s) Inhaler 2 Puff(s) Inhalation daily  trimethoprim  160 mG/sulfamethoxazole 800 mG 1 Tablet(s) Oral daily    MEDICATIONS  (PRN):  acetaminophen     Tablet .. 650 milliGRAM(s) Oral every 6 hours PRN Temp greater or equal to 38C (100.4F), Mild Pain (1 - 3)  aluminum hydroxide/magnesium hydroxide/simethicone Suspension 30 milliLiter(s) Oral every 4 hours PRN Dyspepsia  dextrose Oral Gel 15 Gram(s) Oral once PRN Blood Glucose LESS THAN 70 milliGRAM(s)/deciliter  diphenhydrAMINE 25 milliGRAM(s) Oral daily PRN Allergy symptoms  melatonin 3 milliGRAM(s) Oral at bedtime PRN Insomnia  ondansetron Injectable 4 milliGRAM(s) IV Push every 8 hours PRN Nausea and/or Vomiting      Objective:    Vitals: Vital Signs Last 24 Hrs  T(C): 36.6 (08-19-23 @ 04:33), Max: 36.7 (08-18-23 @ 21:24)  T(F): 97.9 (08-19-23 @ 04:33), Max: 98 (08-18-23 @ 21:24)  HR: 72 (08-19-23 @ 04:33) (70 - 86)  BP: 100/49 (08-19-23 @ 04:33) (100/49 - 113/60)  BP(mean): --  RR: 18 (08-19-23 @ 04:33) (18 - 20)  SpO2: 98% (08-19-23 @ 04:33) (96% - 98%)            I&O's Summary      PHYSICAL EXAM:  GENERAL: NAD  HEAD:  Atraumatic, Normocephalic  EYES: EOMI, conjunctiva and sclera clear  CHEST/LUNG: Clear to auscultation bilaterally; No rales, rhonchi, wheezing, or rubs  HEART: Regular rate and rhythm; No murmurs, rubs, or gallops  ABDOMEN: Soft, Nontender, Nondistended;   SKIN: No rashes or lesions  NERVOUS SYSTEM:  Alert & Oriented X3, no focal deficits    LABS:  08-18    135  |  102  |  23  ----------------------------<  66<L>  6.3<HH>   |  17<L>  |  0.79  08-17    138  |  102  |  21  ----------------------------<  300<H>  5.1   |  25  |  0.72    Ca    8.5      18 Aug 2023 10:39  Ca    8.5      17 Aug 2023 06:32  Phos  3.6     08-18  Mg     2.5     08-18    TPro  6.6  /  Alb  3.3  /  TBili  0.5  /  DBili  x   /  AST  70<H>  /  ALT  26  /  AlkPhos  83  08-18  TPro  5.1<L>  /  Alb  3.3  /  TBili  0.5  /  DBili  x   /  AST  35  /  ALT  23  /  AlkPhos  72  08-17                    Urinalysis Basic - ( 18 Aug 2023 10:39 )    Color: x / Appearance: x / SG: x / pH: x  Gluc: 66 mg/dL / Ketone: x  / Bili: x / Urobili: x   Blood: x / Protein: x / Nitrite: x   Leuk Esterase: x / RBC: x / WBC x   Sq Epi: x / Non Sq Epi: x / Bacteria: x                              8.1    15.59 )-----------( 215      ( 17 Aug 2023 06:32 )             28.5     CAPILLARY BLOOD GLUCOSE      POCT Blood Glucose.: 151 mg/dL (18 Aug 2023 21:59)  POCT Blood Glucose.: 235 mg/dL (18 Aug 2023 17:22)  POCT Blood Glucose.: 245 mg/dL (18 Aug 2023 12:27)  POCT Blood Glucose.: 81 mg/dL (18 Aug 2023 08:20)      RADIOLOGY & ADDITIONAL TESTS:    Imaging Personally Reviewed:  [x ] YES  [ ] NO    Consultants involved in case:   Consultant(s) Notes Reviewed:  [ x] YES  [ ] NO:   Care Discussed with Consultants/Other Providers [x ] YES  [ ] NO         ***************************************************************  Eulalio Robertson) [facility] PGY2  Internal Medicine   ***************************************************************    RAYRAY RODRIGUEZ  72y  MRN: [id number]    Patient is a 72y old  Female who presents with a chief complaint of sob (18 Aug 2023 17:04)      Subjective: NAEO. Reports continuing palpitations from before, stable.     MEDICATIONS  (STANDING):  albuterol/ipratropium for Nebulization 3 milliLiter(s) Nebulizer every 6 hours  apixaban 5 milliGRAM(s) Oral every 12 hours  atorvastatin 20 milliGRAM(s) Oral at bedtime  buDESOnide    Inhalation Suspension 0.5 milliGRAM(s) Inhalation two times a day  chlorhexidine 4% Liquid 1 Application(s) Topical daily  dextrose 5%. 1000 milliLiter(s) (50 mL/Hr) IV Continuous <Continuous>  dextrose 5%. 1000 milliLiter(s) (100 mL/Hr) IV Continuous <Continuous>  dextrose 50% Injectable 25 Gram(s) IV Push once  dextrose 50% Injectable 12.5 Gram(s) IV Push once  dextrose 50% Injectable 25 Gram(s) IV Push once  glucagon  Injectable 1 milliGRAM(s) IntraMuscular once  insulin glargine Injectable (LANTUS) 40 Unit(s) SubCutaneous at bedtime  insulin lispro (ADMELOG) corrective regimen sliding scale   SubCutaneous three times a day before meals  insulin lispro (ADMELOG) corrective regimen sliding scale   SubCutaneous at bedtime  insulin lispro Injectable (ADMELOG) 16 Unit(s) SubCutaneous three times a day with meals  linezolid    Tablet 600 milliGRAM(s) Oral every 12 hours  mexiletine 200 milliGRAM(s) Oral every 8 hours  nystatin    Suspension 058074 Unit(s) Swish and Swallow two times a day  pantoprazole    Tablet 40 milliGRAM(s) Oral before breakfast  polyethylene glycol 3350 17 Gram(s) Oral two times a day  predniSONE   Tablet 40 milliGRAM(s) Oral daily  senna 2 Tablet(s) Oral at bedtime  sodium chloride 7% Inhalation 4 milliLiter(s) Inhalation every 6 hours  tiotropium 2.5 MICROgram(s) Inhaler 2 Puff(s) Inhalation daily  trimethoprim  160 mG/sulfamethoxazole 800 mG 1 Tablet(s) Oral daily    MEDICATIONS  (PRN):  acetaminophen     Tablet .. 650 milliGRAM(s) Oral every 6 hours PRN Temp greater or equal to 38C (100.4F), Mild Pain (1 - 3)  aluminum hydroxide/magnesium hydroxide/simethicone Suspension 30 milliLiter(s) Oral every 4 hours PRN Dyspepsia  dextrose Oral Gel 15 Gram(s) Oral once PRN Blood Glucose LESS THAN 70 milliGRAM(s)/deciliter  diphenhydrAMINE 25 milliGRAM(s) Oral daily PRN Allergy symptoms  melatonin 3 milliGRAM(s) Oral at bedtime PRN Insomnia  ondansetron Injectable 4 milliGRAM(s) IV Push every 8 hours PRN Nausea and/or Vomiting      Objective:    Vitals: Vital Signs Last 24 Hrs  T(C): 36.6 (08-19-23 @ 04:33), Max: 36.7 (08-18-23 @ 21:24)  T(F): 97.9 (08-19-23 @ 04:33), Max: 98 (08-18-23 @ 21:24)  HR: 72 (08-19-23 @ 04:33) (70 - 86)  BP: 100/49 (08-19-23 @ 04:33) (100/49 - 113/60)  BP(mean): --  RR: 18 (08-19-23 @ 04:33) (18 - 20)  SpO2: 98% (08-19-23 @ 04:33) (96% - 98%)            I&O's Summary      PHYSICAL EXAM:  GENERAL: NAD  HEAD:  Atraumatic, Normocephalic  EYES: EOMI, conjunctiva and sclera clear  CHEST/LUNG: Clear to auscultation bilaterally; No rales, rhonchi, wheezing, or rubs  HEART: Regular rate and rhythm; No murmurs, rubs, or gallops  ABDOMEN: Soft, Nontender, Nondistended;   SKIN: No rashes or lesions  NERVOUS SYSTEM:  Alert & Oriented X3, no focal deficits    LABS:  08-18    135  |  102  |  23  ----------------------------<  66<L>  6.3<HH>   |  17<L>  |  0.79  08-17    138  |  102  |  21  ----------------------------<  300<H>  5.1   |  25  |  0.72    Ca    8.5      18 Aug 2023 10:39  Ca    8.5      17 Aug 2023 06:32  Phos  3.6     08-18  Mg     2.5     08-18    TPro  6.6  /  Alb  3.3  /  TBili  0.5  /  DBili  x   /  AST  70<H>  /  ALT  26  /  AlkPhos  83  08-18  TPro  5.1<L>  /  Alb  3.3  /  TBili  0.5  /  DBili  x   /  AST  35  /  ALT  23  /  AlkPhos  72  08-17                    Urinalysis Basic - ( 18 Aug 2023 10:39 )    Color: x / Appearance: x / SG: x / pH: x  Gluc: 66 mg/dL / Ketone: x  / Bili: x / Urobili: x   Blood: x / Protein: x / Nitrite: x   Leuk Esterase: x / RBC: x / WBC x   Sq Epi: x / Non Sq Epi: x / Bacteria: x                              8.1    15.59 )-----------( 215      ( 17 Aug 2023 06:32 )             28.5     CAPILLARY BLOOD GLUCOSE      POCT Blood Glucose.: 151 mg/dL (18 Aug 2023 21:59)  POCT Blood Glucose.: 235 mg/dL (18 Aug 2023 17:22)  POCT Blood Glucose.: 245 mg/dL (18 Aug 2023 12:27)  POCT Blood Glucose.: 81 mg/dL (18 Aug 2023 08:20)      RADIOLOGY & ADDITIONAL TESTS:    Imaging Personally Reviewed:  [x ] YES  [ ] NO    Consultants involved in case:   Consultant(s) Notes Reviewed:  [ x] YES  [ ] NO:   Care Discussed with Consultants/Other Providers [x ] YES  [ ] NO

## 2023-08-19 NOTE — PROGRESS NOTE ADULT - SUBJECTIVE AND OBJECTIVE BOX
[facility], Division of Infectious   Dr Gruber, Dr Nguyen, Dr Boothe, MEERA Montelongo Terrell  [phone]  after hours and weekends [phone]    Name: RAYRAY RODRIGUEZ  Age: 72y  Gender: Female  MRN: [id number]    Interval History--  Notes reviewed  up in chair  states better but still not baseline         Allergies    tetanus toxoid (Short breath)  cefepime (Anaphylaxis)  penicillin (Anaphylaxis)  Avelox (Short breath; Pruritus)  Dilaudid (Short breath)  codeine (Short breath)  aspirin (Short breath)  iodine (Short breath; Swelling)  Valium (Short breath)  shellfish (Anaphylaxis)    Intolerances        Medications--  Antibiotics:  linezolid    Tablet 600 milliGRAM(s) Oral every 12 hours  nystatin    Suspension 595506 Unit(s) Swish and Swallow two times a day  trimethoprim  160 mG/sulfamethoxazole 800 mG 1 Tablet(s) Oral daily    Immunologic:    Other:  acetaminophen     Tablet .. PRN  albuterol/ipratropium for Nebulization  aluminum hydroxide/magnesium hydroxide/simethicone Suspension PRN  apixaban  atorvastatin  buDESOnide    Inhalation Suspension  chlorhexidine 4% Liquid  dextrose 5%.  dextrose 5%.  dextrose 50% Injectable  dextrose 50% Injectable  dextrose 50% Injectable  dextrose Oral Gel PRN  diphenhydrAMINE PRN  glucagon  Injectable  insulin glargine Injectable (LANTUS)  insulin lispro (ADMELOG) corrective regimen sliding scale  insulin lispro (ADMELOG) corrective regimen sliding scale  insulin lispro Injectable (ADMELOG)  melatonin PRN  mexiletine  ondansetron Injectable PRN  pantoprazole    Tablet  polyethylene glycol 3350  predniSONE   Tablet  senna  sodium chloride 7% Inhalation  tiotropium 2.5 MICROgram(s) Inhaler      Review of Systems--  A 10-point review of systems was obtained.     Pertinent positives and negatives--  Constitutional: No fevers. No Chills. No Rigors.   Cardiovascular: No chest pain. No palpitations.  Respiratory: No shortness of breath. + cough.  Gastrointestinal: No nausea or vomiting. No diarrhea or constipation.   Psychiatric: Pleasant. Appropriate affect.    Review of systems otherwise negative except as previously noted.    Physical Examination--  Vital Signs: T(F): 98 (08-19-23 @ 13:49), Max: 98 (08-18-23 @ 21:24)  HR: 77 (08-19-23 @ 13:49)  BP: 105/54 (08-19-23 @ 13:49)  RR: 18 (08-19-23 @ 13:49)  SpO2: 99% (08-19-23 @ 13:49)  Wt(kg): --  General: Nontoxic-appearing Female in no acute distress.  HEENT: AT/NC.   Neck: Not rigid. No sense of mass.  Nodes: None palpable.  Lungs: rhonchi clears with cough  Heart: Regular rate and rhythm. + Murmurill.  Abdomen: Bowel sounds present and normoactive. Soft. Nondistended. Nontender.   Back: No spinal tenderness. No costovertebral angle tenderness.   Extremities: No cyanosis or clubbing. trace edema.   Skin: Warm. Dry. Good turgor. No rash. No vasculitic stigmata.  Psychiatric: Appropriate affect and mood for situation.         Laboratory Studies--  CBC      Chemistries  08-18    135  |  102  |  23  ----------------------------<  66<L>  6.3<HH>   |  17<L>  |  0.79    Ca    8.5      18 Aug 2023 10:39  Phos  3.6     08-18  Mg     2.5     08-18    TPro  6.6  /  Alb  3.3  /  TBili  0.5  /  DBili  x   /  AST  70<H>  /  ALT  26  /  AlkPhos  83  08-18      Culture Data    Culture - Sputum, Cystic Fibrosis (collected 18 Aug 2023 23:27)  Source: .Sputum Sputum  Gram Stain (19 Aug 2023 03:39):    No polymorphonuclear leukocytes per low power field    No Squamous epithelial cells per low power field    No organisms seen per oil power field    Culture - Sputum (collected 18 Aug 2023 10:43)  Source: .Sputum Sputum  Gram Stain (18 Aug 2023 20:40):    No polymorphonuclear leukocytes per low power field    Rare Squamous epithelial cells per low power field    Moderate Gram positive cocci in pairs per oil power field    Few Gram Negative Rods per oil power field    Culture - Blood (collected 17 Aug 2023 09:35)  Source: .Blood Blood-Peripheral  Preliminary Report (18 Aug 2023 18:01):    No growth at 24 hours    Culture - Blood (collected 17 Aug 2023 09:30)  Source: .Blood Blood-Peripheral  Preliminary Report (18 Aug 2023 18:01):    No growth at 24 hours    Culture - Sputum, Cystic Fibrosis (collected 17 Aug 2023 01:18)  Source: .Sputum Sputum  Gram Stain (17 Aug 2023 10:15):    Rare Squamous epithelial cells per low power field    No polymorphonuclear leukocytes per low power field    Rare Gram positive cocci in pairs per oil power field  Preliminary Report (18 Aug 2023 13:51):    Moderate Staphylococcus aureus    Numerous Normal Respiratory Jessica present  Organism: Methicillin resistant Staphylococcus aureus (19 Aug 2023 12:33)  Organism: Methicillin resistant Staphylococcus aureus (19 Aug 2023 12:33)    Culture - Sputum (collected 15 Aug 2023 12:49)  Source: .Sputum Sputum  Gram Stain (15 Aug 2023 21:07):    Rare Squamous epithelial cells per low power field    Rare polymorphonuclear leukocytes per low power field    Rare Yeast like cells per oil power field  Final Report (17 Aug 2023 18:08):    Normal Respiratory Jessica present    Culture - Acid Fast - Sputum w/Smear (collected 14 Aug 2023 13:11)  Source: .Sputum Sputum

## 2023-08-19 NOTE — PROGRESS NOTE ADULT - PROBLEM SELECTOR PLAN 3
-c/w Eliquis, Mexiletine  - EKG NSR 75, bifascicular block -c/w Eliquis, Mexiletine  - EKG NSR 75, bifascicular block  - Pt reporting palpitations w/ noted HRs , saturating 99% RA. Reports that diltiazem held previously.

## 2023-08-19 NOTE — PROGRESS NOTE ADULT - ASSESSMENT
73 yo PMHx  asthma on chronic steroids, bronchiectasis s/p surgery, allergic rhinitis,  recurrent PNA, PND, tracheomalacia s/p tracheoplasty, ESBL proteus infections (sputum),  diabetes, paroxysmal A-fib on Eliquis, colorectal cancer many years ago status post colostomy presenting with concern for shortness of breath. Prior issues with ESBL/Proteus/yeast in sputum culture and was treated with ertapenem/Benadryl/vancomycin/aztreonam and methylprednisolone.   She was discharged home with a midline and completed a course of antibiotics ertapenem (last dose 06/25) PT now readmitted with severe dyspnea and reports that having had asthma for 61 years she feels like she needs an antibiotic. Reports not tolerating meropenem. Patient follows with Dr Allison who knows this complicated pt very well  Noted  pt has PCN, cefepime allergies    RECOMMENDATIONS  -no evidence of airspace disease so suspect likely Bronchiectasis exacerbation  -recent sputums with ESBL proteus so having tolerated ertapenem and was restarted   -Repeat CXR with no pneumonia   -Fungitell negative     -8/15 Sputum culture with normal resp val   -8/14 Sputum AFB smear negative -f/u culture to rule out DIEUDONNE  -8/17 CF sputum culture with mod Staph aureus and normal resp val    -leukocytosis trend noted - suspect may be reactive in setting of steroids   - no new complaints or focal findings on exam; remains afebrile, reports feels slightly better today but still feels "something is not right"    Pulmonary following - planning to change to po steroid 8/19  Follow AFB, fungal sputum cultures   r S.aureus sensitivities -- mrsa    linezolid day 2 of 7   ertapenem (8/12-8/18  Supportive care, chest PT, neb treatments   Monitor temps/CBC

## 2023-08-20 LAB
-  AMIKACIN: SIGNIFICANT CHANGE UP
-  AMOXICILLIN/CLAVULANIC ACID: SIGNIFICANT CHANGE UP
-  AMPICILLIN/SULBACTAM: SIGNIFICANT CHANGE UP
-  AMPICILLIN: SIGNIFICANT CHANGE UP
-  AZTREONAM: SIGNIFICANT CHANGE UP
-  CEFAZOLIN: SIGNIFICANT CHANGE UP
-  CEFEPIME: SIGNIFICANT CHANGE UP
-  CEFOXITIN: SIGNIFICANT CHANGE UP
-  CEFTRIAXONE: SIGNIFICANT CHANGE UP
-  CIPROFLOXACIN: SIGNIFICANT CHANGE UP
-  ERTAPENEM: SIGNIFICANT CHANGE UP
-  GENTAMICIN: SIGNIFICANT CHANGE UP
-  LEVOFLOXACIN: SIGNIFICANT CHANGE UP
-  MEROPENEM: SIGNIFICANT CHANGE UP
-  PIPERACILLIN/TAZOBACTAM: SIGNIFICANT CHANGE UP
-  TOBRAMYCIN: SIGNIFICANT CHANGE UP
-  TRIMETHOPRIM/SULFAMETHOXAZOLE: SIGNIFICANT CHANGE UP
ALBUMIN SERPL ELPH-MCNC: 3.3 G/DL — SIGNIFICANT CHANGE UP (ref 3.3–5)
ALP SERPL-CCNC: 71 U/L — SIGNIFICANT CHANGE UP (ref 40–120)
ALT FLD-CCNC: 22 U/L — SIGNIFICANT CHANGE UP (ref 10–45)
ANION GAP SERPL CALC-SCNC: 13 MMOL/L — SIGNIFICANT CHANGE UP (ref 5–17)
AST SERPL-CCNC: 57 U/L — HIGH (ref 10–40)
BASOPHILS # BLD AUTO: 0.02 K/UL — SIGNIFICANT CHANGE UP (ref 0–0.2)
BASOPHILS NFR BLD AUTO: 0.1 % — SIGNIFICANT CHANGE UP (ref 0–2)
BILIRUB SERPL-MCNC: 0.6 MG/DL — SIGNIFICANT CHANGE UP (ref 0.2–1.2)
BUN SERPL-MCNC: 24 MG/DL — HIGH (ref 7–23)
CALCIUM SERPL-MCNC: 9 MG/DL — SIGNIFICANT CHANGE UP (ref 8.4–10.5)
CHLORIDE SERPL-SCNC: 105 MMOL/L — SIGNIFICANT CHANGE UP (ref 96–108)
CO2 SERPL-SCNC: 23 MMOL/L — SIGNIFICANT CHANGE UP (ref 22–31)
CREAT SERPL-MCNC: 0.75 MG/DL — SIGNIFICANT CHANGE UP (ref 0.5–1.3)
CULTURE RESULTS: SIGNIFICANT CHANGE UP
EGFR: 83 ML/MIN/1.73M2 — SIGNIFICANT CHANGE UP
EOSINOPHIL # BLD AUTO: 0.04 K/UL — SIGNIFICANT CHANGE UP (ref 0–0.5)
EOSINOPHIL NFR BLD AUTO: 0.3 % — SIGNIFICANT CHANGE UP (ref 0–6)
GLUCOSE BLDC GLUCOMTR-MCNC: 146 MG/DL — HIGH (ref 70–99)
GLUCOSE BLDC GLUCOMTR-MCNC: 261 MG/DL — HIGH (ref 70–99)
GLUCOSE BLDC GLUCOMTR-MCNC: 388 MG/DL — HIGH (ref 70–99)
GLUCOSE BLDC GLUCOMTR-MCNC: 79 MG/DL — SIGNIFICANT CHANGE UP (ref 70–99)
GLUCOSE SERPL-MCNC: 85 MG/DL — SIGNIFICANT CHANGE UP (ref 70–99)
HCT VFR BLD CALC: 29.5 % — LOW (ref 34.5–45)
HGB BLD-MCNC: 8.5 G/DL — LOW (ref 11.5–15.5)
IMM GRANULOCYTES NFR BLD AUTO: 2.8 % — HIGH (ref 0–0.9)
LYMPHOCYTES # BLD AUTO: 1.78 K/UL — SIGNIFICANT CHANGE UP (ref 1–3.3)
LYMPHOCYTES # BLD AUTO: 12.9 % — LOW (ref 13–44)
MAGNESIUM SERPL-MCNC: 2.3 MG/DL — SIGNIFICANT CHANGE UP (ref 1.6–2.6)
MCHC RBC-ENTMCNC: 23 PG — LOW (ref 27–34)
MCHC RBC-ENTMCNC: 28.8 GM/DL — LOW (ref 32–36)
MCV RBC AUTO: 79.7 FL — LOW (ref 80–100)
METHOD TYPE: SIGNIFICANT CHANGE UP
MONOCYTES # BLD AUTO: 1.26 K/UL — HIGH (ref 0–0.9)
MONOCYTES NFR BLD AUTO: 9.2 % — SIGNIFICANT CHANGE UP (ref 2–14)
NEUTROPHILS # BLD AUTO: 10.28 K/UL — HIGH (ref 1.8–7.4)
NEUTROPHILS NFR BLD AUTO: 74.7 % — SIGNIFICANT CHANGE UP (ref 43–77)
NRBC # BLD: 4 /100 WBCS — HIGH (ref 0–0)
PHOSPHATE SERPL-MCNC: 3.6 MG/DL — SIGNIFICANT CHANGE UP (ref 2.5–4.5)
PLATELET # BLD AUTO: 207 K/UL — SIGNIFICANT CHANGE UP (ref 150–400)
POTASSIUM SERPL-MCNC: 4.5 MMOL/L — SIGNIFICANT CHANGE UP (ref 3.5–5.3)
POTASSIUM SERPL-SCNC: 4.5 MMOL/L — SIGNIFICANT CHANGE UP (ref 3.5–5.3)
PROT SERPL-MCNC: 5.4 G/DL — LOW (ref 6–8.3)
RBC # BLD: 3.7 M/UL — LOW (ref 3.8–5.2)
RBC # FLD: 27.1 % — HIGH (ref 10.3–14.5)
SODIUM SERPL-SCNC: 141 MMOL/L — SIGNIFICANT CHANGE UP (ref 135–145)
SPECIMEN SOURCE: SIGNIFICANT CHANGE UP
WBC # BLD: 13.76 K/UL — HIGH (ref 3.8–10.5)
WBC # FLD AUTO: 13.76 K/UL — HIGH (ref 3.8–10.5)

## 2023-08-20 PROCEDURE — 99233 SBSQ HOSP IP/OBS HIGH 50: CPT

## 2023-08-20 RX ADMIN — Medication 0.5 MILLIGRAM(S): at 06:00

## 2023-08-20 RX ADMIN — APIXABAN 5 MILLIGRAM(S): 2.5 TABLET, FILM COATED ORAL at 05:58

## 2023-08-20 RX ADMIN — SODIUM CHLORIDE 4 MILLILITER(S): 9 INJECTION INTRAMUSCULAR; INTRAVENOUS; SUBCUTANEOUS at 17:31

## 2023-08-20 RX ADMIN — LINEZOLID 600 MILLIGRAM(S): 600 INJECTION, SOLUTION INTRAVENOUS at 17:33

## 2023-08-20 RX ADMIN — POLYETHYLENE GLYCOL 3350 17 GRAM(S): 17 POWDER, FOR SOLUTION ORAL at 06:00

## 2023-08-20 RX ADMIN — APIXABAN 5 MILLIGRAM(S): 2.5 TABLET, FILM COATED ORAL at 17:32

## 2023-08-20 RX ADMIN — LINEZOLID 600 MILLIGRAM(S): 600 INJECTION, SOLUTION INTRAVENOUS at 05:59

## 2023-08-20 RX ADMIN — SENNA PLUS 2 TABLET(S): 8.6 TABLET ORAL at 21:49

## 2023-08-20 RX ADMIN — Medication 3 MILLILITER(S): at 06:40

## 2023-08-20 RX ADMIN — Medication 1 TABLET(S): at 11:30

## 2023-08-20 RX ADMIN — Medication 10: at 17:32

## 2023-08-20 RX ADMIN — TIOTROPIUM BROMIDE 2 PUFF(S): 18 CAPSULE ORAL; RESPIRATORY (INHALATION) at 11:27

## 2023-08-20 RX ADMIN — Medication 40 MILLIGRAM(S): at 05:58

## 2023-08-20 RX ADMIN — Medication 3 MILLILITER(S): at 23:35

## 2023-08-20 RX ADMIN — MEXILETINE HYDROCHLORIDE 200 MILLIGRAM(S): 150 CAPSULE ORAL at 05:58

## 2023-08-20 RX ADMIN — Medication 16 UNIT(S): at 13:29

## 2023-08-20 RX ADMIN — Medication 16 UNIT(S): at 17:32

## 2023-08-20 RX ADMIN — Medication 3 MILLILITER(S): at 11:28

## 2023-08-20 RX ADMIN — MEXILETINE HYDROCHLORIDE 200 MILLIGRAM(S): 150 CAPSULE ORAL at 13:29

## 2023-08-20 RX ADMIN — INSULIN GLARGINE 36 UNIT(S): 100 INJECTION, SOLUTION SUBCUTANEOUS at 21:43

## 2023-08-20 RX ADMIN — Medication 6: at 13:29

## 2023-08-20 RX ADMIN — SODIUM CHLORIDE 4 MILLILITER(S): 9 INJECTION INTRAMUSCULAR; INTRAVENOUS; SUBCUTANEOUS at 06:00

## 2023-08-20 RX ADMIN — CHLORHEXIDINE GLUCONATE 1 APPLICATION(S): 213 SOLUTION TOPICAL at 11:33

## 2023-08-20 RX ADMIN — SODIUM CHLORIDE 4 MILLILITER(S): 9 INJECTION INTRAMUSCULAR; INTRAVENOUS; SUBCUTANEOUS at 23:35

## 2023-08-20 RX ADMIN — Medication 500000 UNIT(S): at 17:31

## 2023-08-20 RX ADMIN — SODIUM CHLORIDE 4 MILLILITER(S): 9 INJECTION INTRAMUSCULAR; INTRAVENOUS; SUBCUTANEOUS at 11:28

## 2023-08-20 RX ADMIN — Medication 0.5 MILLIGRAM(S): at 17:33

## 2023-08-20 RX ADMIN — PANTOPRAZOLE SODIUM 40 MILLIGRAM(S): 20 TABLET, DELAYED RELEASE ORAL at 06:05

## 2023-08-20 RX ADMIN — Medication 500000 UNIT(S): at 05:59

## 2023-08-20 RX ADMIN — Medication 16 UNIT(S): at 08:32

## 2023-08-20 RX ADMIN — MEXILETINE HYDROCHLORIDE 200 MILLIGRAM(S): 150 CAPSULE ORAL at 21:44

## 2023-08-20 RX ADMIN — ATORVASTATIN CALCIUM 20 MILLIGRAM(S): 80 TABLET, FILM COATED ORAL at 21:44

## 2023-08-20 RX ADMIN — POLYETHYLENE GLYCOL 3350 17 GRAM(S): 17 POWDER, FOR SOLUTION ORAL at 17:30

## 2023-08-20 RX ADMIN — Medication 3 MILLILITER(S): at 17:31

## 2023-08-20 NOTE — PROGRESS NOTE ADULT - ASSESSMENT
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 P/w SOB, found to have bronchial secretions on CT, admit to medicine for exacerbation of bronchiectasis. Sputum culture growing MRSA, on linezolid.

## 2023-08-20 NOTE — PROGRESS NOTE ADULT - PROBLEM SELECTOR PLAN 1
- CT chest showed increased bronchial secretions, on RA saturating well, speaking in full sentences, unclear trigger, likely due to infection, or inflammation.     - sputum culture grew MRSA, on linezolid, ID following  - hypertonic saline 7% and albuterol neb q6h standing, followed by use of airway clearance device (patient brought her own from home)  - chest PT as tolerated   - budesonide 0.5mg neb q12h standing   - tapered to solumedrol 40 mg IV daily  - pulm following, appreciate recs  - echo: hyperdynamic LV, likely normal RV function

## 2023-08-20 NOTE — PROGRESS NOTE ADULT - PROBLEM SELECTOR PLAN 3
-c/w Eliquis, Mexiletine  - EKG NSR 75, bifascicular block  - Pt reporting palpitations w/ noted HRs , saturating 99% RA. Reports that diltiazem held previously.

## 2023-08-20 NOTE — PROGRESS NOTE ADULT - SUBJECTIVE AND OBJECTIVE BOX
Lázaro Molina, PGY-1  Please contact on Teams    RAYRAY RODRIGUEZ  72y  Female    Reason for Admission:       SUBJECTIVE/INTERVAL EVENTS: No acute events. Pt seen and examined at bedside.    PHYSICAL EXAM:  GENERAL: NAD, lying comfortably in bed   HEAD:  Atraumatic, Normocephalic  EYES: EOMI b/l, PERRLA b/l, conjunctiva and sclera clear  NECK: Supple, No JVD, No LAD   CHEST/LUNG: coarse breath sounds, similar to prior  HEART: Regular rate and rhythm; S1 and S2 present  ABDOMEN: Soft, Nontender, Nondistended; Bowel sounds present  EXTREMITIES:  No clubbing, cyanosis, or edema  NEURO: AAOx3, non-focal   SKIN: No rashes or     Vital Signs Last 24 Hrs  T(C): 36.7 (20 Aug 2023 04:42), Max: 36.8 (19 Aug 2023 20:47)  T(F): 98 (20 Aug 2023 04:42), Max: 98.3 (19 Aug 2023 20:47)  HR: 82 (20 Aug 2023 04:42) (73 - 82)  BP: 113/53 (20 Aug 2023 04:42) (105/54 - 142/64)  BP(mean): --  RR: 18 (20 Aug 2023 04:42) (18 - 18)  SpO2: 98% (20 Aug 2023 04:42) (97% - 100%)    Parameters below as of 20 Aug 2023 04:42  Patient On (Oxygen Delivery Method): room air        Consultant(s) Notes Reviewed:  [x] YES  [ ] NO  Care Discussed with Consultants/Other Providers [x] YES  [ ] NO    LABS:                        8.8    14.13 )-----------( 215      ( 19 Aug 2023 15:25 )             31.0                               141    |  105    |  24                  Calcium: 9.0   / iCa: x      (08-20 @ 06:30)    ----------------------------<  85        Magnesium: 2.3                              4.5     |  23     |  0.75             Phosphorous: 3.6      TPro  5.4    /  Alb  3.3    /  TBili  0.6    /  DBili  x      /  AST  57     /  ALT  22     /  AlkPhos  71     20 Aug 2023 06:30    Urinalysis Basic - ( 20 Aug 2023 06:30 )    Color: x / Appearance: x / SG: x / pH: x  Gluc: 85 mg/dL / Ketone: x  / Bili: x / Urobili: x   Blood: x / Protein: x / Nitrite: x   Leuk Esterase: x / RBC: x / WBC x   Sq Epi: x / Non Sq Epi: x / Bacteria: x    .Sputum Sputum  08-18 @ 23:27   Rare Normal Respiratory Jessica present  --    No polymorphonuclear leukocytes per low power field  No Squamous epithelial cells per low power field  No organisms seen per oil power field      .Sputum Sputum  08-18 @ 10:43   Normal Respiratory Jessica present  --    No polymorphonuclear leukocytes per low power field  Rare Squamous epithelial cells per low power field  Moderate Gram positive cocci in pairs per oil power field  Few Gram Negative Rods per oil power field      .Blood Blood-Peripheral  08-17 @ 09:35   No growth at 48 Hours  --  --      .Blood Blood-Peripheral  08-17 @ 09:30   No growth at 48 Hours  --  --      .Sputum Sputum  08-17 @ 01:18   Moderate Staphylococcus aureus  Numerous Normal Respiratory Jessica present  --  Methicillin resistant Staphylococcus aureus      .Sputum Sputum  08-15 @ 12:49   Normal Respiratory Jessica present  --    Rare Squamous epithelial cells per low power field  Rare polymorphonuclear leukocytes per low power field  Rare Yeast like cells per oil power field      .Sputum Sputum  08-14 @ 13:11   Culture is being performed.  --  --      .Blood Blood-Peripheral  08-11 @ 16:00   No growth at 5 days  --  --      .Blood Blood-Peripheral  08-11 @ 15:15   No growth at 5 days  --  --      .Sputum Sputum  06-09 @ 16:18   Numerous Proteus mirabilis ESBL  Normal Respiratory Jessica present  --  Proteus mirabilis ESBL      .Blood Blood-Peripheral  06-05 @ 04:00   No Growth Final  --  --      Clean Catch Clean Catch (Midstream)  06-05 @ 03:40   10,000 - 49,000 CFU/mL Proteus mirabilis ESBL  <10,000 CFU/ml Normal Urogenital jessica present  --  Proteus mirabilis ESBL          RADIOLOGY & ADDITIONAL TESTS:    Imaging Personally Reviewed:  [x] YES  [ ] NO    MEDICATIONS  (STANDING):  albuterol/ipratropium for Nebulization 3 milliLiter(s) Nebulizer every 6 hours  apixaban 5 milliGRAM(s) Oral every 12 hours  atorvastatin 20 milliGRAM(s) Oral at bedtime  buDESOnide    Inhalation Suspension 0.5 milliGRAM(s) Inhalation two times a day  chlorhexidine 4% Liquid 1 Application(s) Topical daily  dextrose 5%. 1000 milliLiter(s) (50 mL/Hr) IV Continuous <Continuous>  dextrose 5%. 1000 milliLiter(s) (100 mL/Hr) IV Continuous <Continuous>  dextrose 50% Injectable 25 Gram(s) IV Push once  dextrose 50% Injectable 12.5 Gram(s) IV Push once  dextrose 50% Injectable 25 Gram(s) IV Push once  glucagon  Injectable 1 milliGRAM(s) IntraMuscular once  insulin glargine Injectable (LANTUS) 36 Unit(s) SubCutaneous at bedtime  insulin lispro (ADMELOG) corrective regimen sliding scale   SubCutaneous three times a day before meals  insulin lispro (ADMELOG) corrective regimen sliding scale   SubCutaneous at bedtime  insulin lispro Injectable (ADMELOG) 16 Unit(s) SubCutaneous three times a day with meals  linezolid    Tablet 600 milliGRAM(s) Oral every 12 hours  mexiletine 200 milliGRAM(s) Oral every 8 hours  nystatin    Suspension [id number] Unit(s) Swish and Swallow two times a day  pantoprazole    Tablet 40 milliGRAM(s) Oral before breakfast  polyethylene glycol 3350 17 Gram(s) Oral two times a day  predniSONE   Tablet 40 milliGRAM(s) Oral daily  senna 2 Tablet(s) Oral at bedtime  sodium chloride 7% Inhalation 4 milliLiter(s) Inhalation every 6 hours  tiotropium 2.5 MICROgram(s) Inhaler 2 Puff(s) Inhalation daily  trimethoprim  160 mG/sulfamethoxazole 800 mG 1 Tablet(s) Oral daily    MEDICATIONS  (PRN):  acetaminophen     Tablet .. 650 milliGRAM(s) Oral every 6 hours PRN Temp greater or equal to 38C (100.4F), Mild Pain (1 - 3)  aluminum hydroxide/magnesium hydroxide/simethicone Suspension 30 milliLiter(s) Oral every 4 hours PRN Dyspepsia  dextrose Oral Gel 15 Gram(s) Oral once PRN Blood Glucose LESS THAN 70 milliGRAM(s)/deciliter  diphenhydrAMINE 25 milliGRAM(s) Oral daily PRN Allergy symptoms  melatonin 3 milliGRAM(s) Oral at bedtime PRN Insomnia  ondansetron Injectable 4 milliGRAM(s) IV Push every 8 hours PRN Nausea and/or Vomiting      HEALTH ISSUES - PROBLEM Dx:  Acute exacerbation of bronchiectasis    Acute asthma exacerbation    SIRS without infection or organ dysfunction    Atrial fibrillation  paroxysmal, on eliquis    Diabetes mellitus    Prophylactic measure    Uncontrolled type 2 diabetes mellitus with hyperglycemia    Essential hypertension    Hyperlipidemia

## 2023-08-20 NOTE — PROGRESS NOTE ADULT - ATTENDING COMMENTS
This is a 72F with h/o bronchiectasis on chronic steroid, s/p surgery, allergic rhinitis,  recurrent PNA, PND, tracheomalacia s/p tracheoplasty, ESBL proteus infections (sputum), T2DM, paroxysmal A-fib on Eliquis, colorectal cancer many years ago status post colostomy presenting with shortness of breath for 3 days. Prior issues with ESBL/Proteus/yeast in sputum culture and was treated with ertapenem/Benadryl/vancomycin/aztreonam and methylprednisolone. She was discharged home with a midline and completed a course of antibiotics ertapenem (last dose 06/25). Recently she was treated for MRSA and pseudomonas in sputum and completed Tobra nebs and doxycycline.    1. Acute on chronic hypoxic RF due to exacerbation of bronchiectasis (on chronic steroid)  2. Recent tracheo-bronchitis due to ESBL proteus/MRSA/pseudomonas  3. Paroxysmal A-fib on Eliquis  4. H/o colorectal cancer, status post colostomy    - Less SOB, afebrile, no chest pain, WBC 13 , O2 sat %% RA in rest & on ambulation  - repeat sputum c/s grew MRSA, now on IV Zyvox  - Pulmonary plans noted- on bronchodilators, PO prednisone, inhaled steroid, s/p IV Ertapenem   - Hypertonic saline to 7% with bronchodilators followed by use of airway clearance device (patient brought her own from home) and chest PT as tolerated   - CT chest shows unchanged infectious/inflammatory small airways disease in the right middle/lower lobes with multifocal small tree-in-bud nodules. Status post ascending aortic and aortic valve replacement, with residual dissection flap better seen on the prior study. Stable indeterminate small left renal nodule and numerous pancreatic cysts.  - LDH 1382, Fungitell neg  - Echo bubble study no shunt, repeat echo ordered to see transvalvular gradient post TAVR  - c/w Eliquis,   - Endocrine f/u plans noted- on basal/bolus insulins FS elevated due to IV steroid  - Her outpatient Pul- Dr Allison is aware.

## 2023-08-21 LAB
-  AMPICILLIN/SULBACTAM: SIGNIFICANT CHANGE UP
-  CEFAZOLIN: SIGNIFICANT CHANGE UP
-  CLINDAMYCIN: SIGNIFICANT CHANGE UP
-  ERYTHROMYCIN: SIGNIFICANT CHANGE UP
-  GENTAMICIN: SIGNIFICANT CHANGE UP
-  LINEZOLID: SIGNIFICANT CHANGE UP
-  OXACILLIN: SIGNIFICANT CHANGE UP
-  PENICILLIN: SIGNIFICANT CHANGE UP
-  RIFAMPIN: SIGNIFICANT CHANGE UP
-  TETRACYCLINE: SIGNIFICANT CHANGE UP
-  TRIMETHOPRIM/SULFAMETHOXAZOLE: SIGNIFICANT CHANGE UP
-  VANCOMYCIN: SIGNIFICANT CHANGE UP
ALBUMIN SERPL ELPH-MCNC: 3.2 G/DL — LOW (ref 3.3–5)
ALP SERPL-CCNC: 62 U/L — SIGNIFICANT CHANGE UP (ref 40–120)
ALT FLD-CCNC: 21 U/L — SIGNIFICANT CHANGE UP (ref 10–45)
ANION GAP SERPL CALC-SCNC: 12 MMOL/L — SIGNIFICANT CHANGE UP (ref 5–17)
AST SERPL-CCNC: 55 U/L — HIGH (ref 10–40)
BASOPHILS # BLD AUTO: 0 K/UL — SIGNIFICANT CHANGE UP (ref 0–0.2)
BASOPHILS NFR BLD AUTO: 0 % — SIGNIFICANT CHANGE UP (ref 0–2)
BILIRUB SERPL-MCNC: 0.7 MG/DL — SIGNIFICANT CHANGE UP (ref 0.2–1.2)
BLD GP AB SCN SERPL QL: NEGATIVE — SIGNIFICANT CHANGE UP
BUN SERPL-MCNC: 23 MG/DL — SIGNIFICANT CHANGE UP (ref 7–23)
CALCIUM SERPL-MCNC: 8.7 MG/DL — SIGNIFICANT CHANGE UP (ref 8.4–10.5)
CHLORIDE SERPL-SCNC: 106 MMOL/L — SIGNIFICANT CHANGE UP (ref 96–108)
CO2 SERPL-SCNC: 22 MMOL/L — SIGNIFICANT CHANGE UP (ref 22–31)
CREAT SERPL-MCNC: 0.7 MG/DL — SIGNIFICANT CHANGE UP (ref 0.5–1.3)
CULTURE RESULTS: SIGNIFICANT CHANGE UP
EGFR: 91 ML/MIN/1.73M2 — SIGNIFICANT CHANGE UP
EOSINOPHIL # BLD AUTO: 0 K/UL — SIGNIFICANT CHANGE UP (ref 0–0.5)
EOSINOPHIL NFR BLD AUTO: 0 % — SIGNIFICANT CHANGE UP (ref 0–6)
GLUCOSE BLDC GLUCOMTR-MCNC: 139 MG/DL — HIGH (ref 70–99)
GLUCOSE BLDC GLUCOMTR-MCNC: 205 MG/DL — HIGH (ref 70–99)
GLUCOSE BLDC GLUCOMTR-MCNC: 239 MG/DL — HIGH (ref 70–99)
GLUCOSE BLDC GLUCOMTR-MCNC: 88 MG/DL — SIGNIFICANT CHANGE UP (ref 70–99)
GLUCOSE BLDC GLUCOMTR-MCNC: 91 MG/DL — SIGNIFICANT CHANGE UP (ref 70–99)
GLUCOSE SERPL-MCNC: 170 MG/DL — HIGH (ref 70–99)
HCT VFR BLD CALC: 26 % — LOW (ref 34.5–45)
HCT VFR BLD CALC: 27.9 % — LOW (ref 34.5–45)
HGB BLD-MCNC: 7.5 G/DL — LOW (ref 11.5–15.5)
HGB BLD-MCNC: 8 G/DL — LOW (ref 11.5–15.5)
LYMPHOCYTES # BLD AUTO: 1.26 K/UL — SIGNIFICANT CHANGE UP (ref 1–3.3)
LYMPHOCYTES # BLD AUTO: 10.5 % — LOW (ref 13–44)
MAGNESIUM SERPL-MCNC: 2.1 MG/DL — SIGNIFICANT CHANGE UP (ref 1.6–2.6)
MANUAL SMEAR VERIFICATION: SIGNIFICANT CHANGE UP
MCHC RBC-ENTMCNC: 22 PG — LOW (ref 27–34)
MCHC RBC-ENTMCNC: 22.4 PG — LOW (ref 27–34)
MCHC RBC-ENTMCNC: 28.7 GM/DL — LOW (ref 32–36)
MCHC RBC-ENTMCNC: 28.8 GM/DL — LOW (ref 32–36)
MCV RBC AUTO: 76.6 FL — LOW (ref 80–100)
MCV RBC AUTO: 77.6 FL — LOW (ref 80–100)
METHOD TYPE: SIGNIFICANT CHANGE UP
MONOCYTES # BLD AUTO: 0.31 K/UL — SIGNIFICANT CHANGE UP (ref 0–0.9)
MONOCYTES NFR BLD AUTO: 2.6 % — SIGNIFICANT CHANGE UP (ref 2–14)
NEUTROPHILS # BLD AUTO: 10.35 K/UL — HIGH (ref 1.8–7.4)
NEUTROPHILS NFR BLD AUTO: 86 % — HIGH (ref 43–77)
NRBC # BLD: 4 /100 WBCS — HIGH (ref 0–0)
NRBC # BLD: 4 /100 — HIGH (ref 0–0)
ORGANISM # SPEC MICROSCOPIC CNT: SIGNIFICANT CHANGE UP
PHOSPHATE SERPL-MCNC: 3.1 MG/DL — SIGNIFICANT CHANGE UP (ref 2.5–4.5)
PLAT MORPH BLD: NORMAL — SIGNIFICANT CHANGE UP
PLATELET # BLD AUTO: 212 K/UL — SIGNIFICANT CHANGE UP (ref 150–400)
PLATELET # BLD AUTO: 224 K/UL — SIGNIFICANT CHANGE UP (ref 150–400)
POTASSIUM SERPL-MCNC: 4.5 MMOL/L — SIGNIFICANT CHANGE UP (ref 3.5–5.3)
POTASSIUM SERPL-SCNC: 4.5 MMOL/L — SIGNIFICANT CHANGE UP (ref 3.5–5.3)
PROT SERPL-MCNC: 5 G/DL — LOW (ref 6–8.3)
RBC # BLD: 3.35 M/UL — LOW (ref 3.8–5.2)
RBC # BLD: 3.64 M/UL — LOW (ref 3.8–5.2)
RBC # FLD: 29.2 % — HIGH (ref 10.3–14.5)
RBC # FLD: 29.3 % — HIGH (ref 10.3–14.5)
RBC BLD AUTO: SIGNIFICANT CHANGE UP
RH IG SCN BLD-IMP: POSITIVE — SIGNIFICANT CHANGE UP
SODIUM SERPL-SCNC: 140 MMOL/L — SIGNIFICANT CHANGE UP (ref 135–145)
SPECIMEN SOURCE: SIGNIFICANT CHANGE UP
VARIANT LYMPHS # BLD: 0.9 % — SIGNIFICANT CHANGE UP (ref 0–6)
WBC # BLD: 12.04 K/UL — HIGH (ref 3.8–10.5)
WBC # BLD: 12.71 K/UL — HIGH (ref 3.8–10.5)
WBC # FLD AUTO: 12.04 K/UL — HIGH (ref 3.8–10.5)
WBC # FLD AUTO: 12.71 K/UL — HIGH (ref 3.8–10.5)

## 2023-08-21 PROCEDURE — 99232 SBSQ HOSP IP/OBS MODERATE 35: CPT

## 2023-08-21 PROCEDURE — 99233 SBSQ HOSP IP/OBS HIGH 50: CPT

## 2023-08-21 RX ORDER — INSULIN GLARGINE 100 [IU]/ML
32 INJECTION, SOLUTION SUBCUTANEOUS AT BEDTIME
Refills: 0 | Status: DISCONTINUED | OUTPATIENT
Start: 2023-08-21 | End: 2023-08-22

## 2023-08-21 RX ORDER — INSULIN LISPRO 100/ML
14 VIAL (ML) SUBCUTANEOUS
Refills: 0 | Status: DISCONTINUED | OUTPATIENT
Start: 2023-08-21 | End: 2023-08-25

## 2023-08-21 RX ADMIN — SODIUM CHLORIDE 4 MILLILITER(S): 9 INJECTION INTRAMUSCULAR; INTRAVENOUS; SUBCUTANEOUS at 13:03

## 2023-08-21 RX ADMIN — POLYETHYLENE GLYCOL 3350 17 GRAM(S): 17 POWDER, FOR SOLUTION ORAL at 06:38

## 2023-08-21 RX ADMIN — Medication 0.5 MILLIGRAM(S): at 06:38

## 2023-08-21 RX ADMIN — Medication 3 MILLILITER(S): at 13:03

## 2023-08-21 RX ADMIN — SODIUM CHLORIDE 4 MILLILITER(S): 9 INJECTION INTRAMUSCULAR; INTRAVENOUS; SUBCUTANEOUS at 06:38

## 2023-08-21 RX ADMIN — SENNA PLUS 2 TABLET(S): 8.6 TABLET ORAL at 21:05

## 2023-08-21 RX ADMIN — Medication 3 MILLILITER(S): at 06:39

## 2023-08-21 RX ADMIN — Medication 40 MILLIGRAM(S): at 06:36

## 2023-08-21 RX ADMIN — Medication 4: at 18:08

## 2023-08-21 RX ADMIN — LINEZOLID 600 MILLIGRAM(S): 600 INJECTION, SOLUTION INTRAVENOUS at 06:36

## 2023-08-21 RX ADMIN — PANTOPRAZOLE SODIUM 40 MILLIGRAM(S): 20 TABLET, DELAYED RELEASE ORAL at 06:38

## 2023-08-21 RX ADMIN — ATORVASTATIN CALCIUM 20 MILLIGRAM(S): 80 TABLET, FILM COATED ORAL at 21:05

## 2023-08-21 RX ADMIN — Medication 500000 UNIT(S): at 06:37

## 2023-08-21 RX ADMIN — MEXILETINE HYDROCHLORIDE 200 MILLIGRAM(S): 150 CAPSULE ORAL at 13:03

## 2023-08-21 RX ADMIN — Medication 3 MILLILITER(S): at 18:07

## 2023-08-21 RX ADMIN — Medication 500000 UNIT(S): at 18:07

## 2023-08-21 RX ADMIN — Medication 0.5 MILLIGRAM(S): at 18:07

## 2023-08-21 RX ADMIN — LINEZOLID 600 MILLIGRAM(S): 600 INJECTION, SOLUTION INTRAVENOUS at 18:07

## 2023-08-21 RX ADMIN — MEXILETINE HYDROCHLORIDE 200 MILLIGRAM(S): 150 CAPSULE ORAL at 21:05

## 2023-08-21 RX ADMIN — POLYETHYLENE GLYCOL 3350 17 GRAM(S): 17 POWDER, FOR SOLUTION ORAL at 18:06

## 2023-08-21 RX ADMIN — APIXABAN 5 MILLIGRAM(S): 2.5 TABLET, FILM COATED ORAL at 06:36

## 2023-08-21 RX ADMIN — SODIUM CHLORIDE 4 MILLILITER(S): 9 INJECTION INTRAMUSCULAR; INTRAVENOUS; SUBCUTANEOUS at 18:07

## 2023-08-21 RX ADMIN — INSULIN GLARGINE 32 UNIT(S): 100 INJECTION, SOLUTION SUBCUTANEOUS at 22:38

## 2023-08-21 RX ADMIN — CHLORHEXIDINE GLUCONATE 1 APPLICATION(S): 213 SOLUTION TOPICAL at 13:04

## 2023-08-21 RX ADMIN — Medication 1 TABLET(S): at 13:03

## 2023-08-21 RX ADMIN — Medication 16 UNIT(S): at 13:04

## 2023-08-21 RX ADMIN — APIXABAN 5 MILLIGRAM(S): 2.5 TABLET, FILM COATED ORAL at 18:07

## 2023-08-21 RX ADMIN — Medication 14 UNIT(S): at 18:07

## 2023-08-21 RX ADMIN — TIOTROPIUM BROMIDE 2 PUFF(S): 18 CAPSULE ORAL; RESPIRATORY (INHALATION) at 13:02

## 2023-08-21 RX ADMIN — MEXILETINE HYDROCHLORIDE 200 MILLIGRAM(S): 150 CAPSULE ORAL at 06:36

## 2023-08-21 RX ADMIN — Medication 16 UNIT(S): at 08:59

## 2023-08-21 NOTE — PROGRESS NOTE ADULT - PROVIDER SPECIALTY LIST ADULT
"Hospital Medicine  Progress Notes    Team: [facility] [place] O Elio Godinez IV, MD  Admit Date: 7/24/2018  CHELSIE  7/25/2018  Principal Problem:  Accidental medication overdose   Patient information was obtained from past medical records and ER records.   Primary care Physician: Primary Doctor No  Code status: Full Code    HPI:   51 year old female with ESRD on hemodialysis for 6 months, HTN, and DM2 who presented with altered mental status that began yesterday. Daughter is no longer at bedside so history is primarily by ED. Per ED note , "her daughter last saw her normal Monday morning. The patient did not wake up altered. The daughter reports she has been spacing out, repeating words, and clenching her fists. Patient took baclofen last night for shoulder pain but patient did not know the quantity. Patient is on hemodialysis Tuesday, Thursday, and Saturday with last dialysis on Saturday." Patient reports that she currently has no complaints, is aware that she is here because she wasn't acting right, is unsure the amount of baclofen that she took yesterday.    Interval History: No acute events. Reports she feels well and does not feel confused. She states yesterday she felt confused and slowed. Denies any pain, fevers, chills, dyspnea, dysuria, rashes, chest pain. Discussed with daughter over phone who reports no additional questions.     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); Blood transfusion; Chronic pain; Closed head injury; Diabetes mellitus; Diabetes mellitus; Diabetes mellitus type I; Diabetic macular edema, both eyes (3/31/2014); Diabetic retinopathy; ESRD (end stage renal disease) on dialysis; H/O insertion of insulin pump (3/9/2015); Hyperlipidemia; Hypertension; " Hypertension; Hypertensive retinopathy of both eyes (3/31/2014); Insulin pump fitting or adjustment (2014); Left-sided weakness (2018); Medication side effects - Mobic 3/9/15 (3/11/2015); Midline low back pain without sciatica; Neuropathy; Pneumonia; Proliferative diabetic retinopathy, both eyes (3/31/2014); Renal disorder; Seizures; Stroke; Syncope and collapse, onset  (2015); Vitamin D deficiency; and Vitreous hemorrhage (2017).    Past Surgical History: Patient has a past surgical history that includes Hysterectomy;  section, classic; Eye surgery;  section; dialysis graft (Left); and Esophagogastroduodenoscopy (N/A, 2018).    Social History: Patient reports that she has quit smoking. She has never used smokeless tobacco. She reports that she does not drink alcohol or use drugs.    Family History: family history includes Asthma in her father; Blindness in her maternal grandmother and mother; Breast cancer in her daughter; Cancer in her cousin; Cataracts in her maternal grandmother; Diabetes in her father, mother, and sister; Glaucoma in her maternal grandmother; Heart disease in her mother; Hypertension in her father, maternal grandmother, and mother; Stroke in her maternal uncle; Thyroid disease in her sister.    Medications:     Allergies: Patient is allergic to ciprofloxacin (bulk); gabapentin; latex; tramadol; vancomycin; vancomycin analogues; neurontin [gabapentin]; niacin; bactrim [sulfamethoxazole-trimethoprim]; latex, natural rubber; and niacin preparations.      PEx  Temp:  [97.8 °F (36.6 °C)-98.3 °F (36.8 °C)]   Pulse:  [66-86]   Resp:  [12-16]   BP: (145-223)/()   SpO2:  [94 %-100 %]   Body mass index is 24.52 kg/m².     General appearance: no distress, somewhat slow to respond to questions but appropriate  Mental status: Alert and oriented x 3  HEENT:  conjunctivae/corneas clear, PERRL  Neck: supple, no tenderness  Pulm:   normal respiratory effort, CTA  B, no c/w/r  Card: RRR, S1, S2 normal, no murmur, click, rub or gallop  Abd: soft, NT, ND, BS present; no masses, no organomegaly  Ext: no c/c/, + edema to shins bilaterally  Skin: color, texture, turgor normal. No rashes or lesions  Neuro: CN II-XII grossly intact, no focal numbness or weakness, normal strength and tone         Intake/Output Summary (Last 24 hours) at 07/25/18 1058  Last data filed at 07/24/18 1609   Gross per 24 hour   Intake              500 ml   Output                0 ml   Net              500 ml       Recent Labs  Lab 07/18/18  1543 07/24/18  1311 07/25/18  0343   WBC 6.67 9.27 8.29   HGB 8.9* 9.1* 7.9*   HCT 29.6* 31.2* 26.7*    255 215       Recent Labs  Lab 07/24/18  1311 07/25/18  0343    143   K 5.3* 5.2*    106   CO2 25 21*   BUN 79* 87*   CREATININE 7.6* 7.7*   * 101   CALCIUM 8.3* 8.3*   MG  --  3.1*   PHOS  --  5.1*       Recent Labs  Lab 07/18/18  1543 07/24/18  1311   ALKPHOS 225* 197*   ALT 16 8*   AST 17 10   ALBUMIN 3.3* 3.1*   PROT 6.8 6.5   BILITOT 0.4 0.4        Recent Labs  Lab 07/24/18  1824 07/24/18  2047 07/25/18  0012 07/25/18  0523 07/25/18  0817 07/25/18  0956   POCTGLUCOSE 450* 354* 179* 73 205* 135*     No results for input(s): CPK, CPKMB, MB, TROPONINI in the last 72 hours.    Recent Labs      07/24/18   1311   LACTATE  1.9        Active Hospital Problems    Diagnosis  POA    Altered mental status [R41.82]  Yes    Cerebrovascular accident (CVA) [I63.9]  Yes    ESRD on dialysis [N18.6, Z99.2]  Not Applicable    Metabolic encephalopathy [G93.41]  Yes    DM gastroparesis [E11.43, K31.84]  Yes    Essential hypertension [I10]  Yes      Resolved Hospital Problems    Diagnosis Date Resolved POA   No resolved problems to display.       Overview  51 year old female with ESRD T/Th/Sa, HTN, DM2, CVAs, who presented with altered mental status likely 2/2 baclofen    Assessment and Plan for Problems addressed today:    Metabolic  encephalopathy  Accidental medication overdose  -Occurred after baclofen injection, pt ESRD and baclofen renally cleared, improving  -No evidence of infection, electrolytes relatively WNL, noted hyperglycemia however bicarb is WNL, less likely DKA as mental status improving  -Continue to monitor  -Nephrology consulted for dialysis, appreciate help  -Anticipate d/c tomorrow if mental status improved  -Infectious w/u negative so far, UA negative, CT head without acute etiology    Uncontrolled type 2 DM with hyperglycemia with long term insulin use  Diabetes mellitus with renal manifestations, uncontrolled  -Improving, A1c >9  -Levemir 20u and aspart 6u AC while inpatient  -Accuchecks AC/HS    HTN, uncontrolled  -Stable  -BP 170s/100s in ED  -Labetolol 10mg X1  -Continue home antihypertensives  -PRN labetolol SBP >190 DBP >110    ESRD on HD  -dialysis per nephrology, appreciate help    H/o CVA  -CT head negative  -Stable, monitor  -Holding ASA 2/2 GIB    H/o gastroparesis  -stable, monitor  -Continue reglan TID AC    H/o GIB  -Gastric ulcer on EGD, H. Pylori on bx negative  -PPI BID  -Stable, Hgb stable, transfuse for Hgb < 7  -ASA held till 7/27    DVT PPx: SCD given recent GIB  Dispo: Pending continued improvement in mental status, and negative infectious w/u possible d/c tomorrow    Elio Godinez MD  Department of Hospital Medicine  [facility]  [phone]     Endocrinology

## 2023-08-21 NOTE — PROGRESS NOTE ADULT - SUBJECTIVE AND OBJECTIVE BOX
CHIEF COMPLAINT: SOB    Interval Events: Feels much better since being treated with Linezolid. Improved SOB and chest congestion.     REVIEW OF SYSTEMS:  Constitutional: [ ] negative [ ] fevers [ ] chills [ ] weight loss [ ] weight gain  HEENT: [ ] negative [ ] dry eyes [ ] eye irritation [ ] postnasal drip [ ] nasal congestion  CV: [ ] negative  [ ] chest pain [ ] orthopnea [ ] palpitations [ ] murmur  Resp: [ ] negative [ ] cough [X] shortness of breath [X] dyspnea [ ] wheezing [X] sputum [ ] hemoptysis  GI: [ ] negative [ ] nausea [ ] vomiting [ ] diarrhea [ ] constipation [ ] abd pain [ ] dysphagia   : [ ] negative [ ] dysuria [ ] nocturia [ ] hematuria [ ] increased urinary frequency  Musculoskeletal: [ ] negative [ ] back pain [ ] myalgias [ ] arthralgias [ ] fracture  Skin: [ ] negative [ ] rash [ ] itch  Neurological: [ ] negative [ ] headache [ ] dizziness [ ] syncope [ ] weakness [ ] numbness  Psychiatric: [ ] negative [ ] anxiety [ ] depression  Endocrine: [ ] negative [ ] diabetes [ ] thyroid problem  Hematologic/Lymphatic: [ ] negative [ ] anemia [ ] bleeding problem  Allergic/Immunologic: [ ] negative [ ] itchy eyes [ ] nasal discharge [ ] hives [ ] angioedema  [X] All other systems negative  [ ] Unable to assess ROS because ________    OBJECTIVE:  ICU Vital Signs Last 24 Hrs  T(C): 36.6 (20 Aug 2023 19:51), Max: 36.6 (20 Aug 2023 19:51)  T(F): 97.9 (20 Aug 2023 19:51), Max: 97.9 (20 Aug 2023 19:51)  HR: 84 (20 Aug 2023 19:51) (84 - 84)  BP: 129/53 (20 Aug 2023 19:51) (129/53 - 129/53)  BP(mean): --  ABP: --  ABP(mean): --  RR: 18 (20 Aug 2023 19:51) (18 - 18)  SpO2: 99% (20 Aug 2023 19:51) (99% - 99%)    O2 Parameters below as of 20 Aug 2023 19:51  Patient On (Oxygen Delivery Method): room air              CAPILLARY BLOOD GLUCOSE      POCT Blood Glucose.: 91 mg/dL (21 Aug 2023 13:00)      PHYSICAL EXAM:  General: NAD, resting comforrably  HEENT: EOMI, PERRLA  Neck: Supple  Respiratory: Scattered crackles  Cardiovascular: S1S2, RRR  Abdomen: Soft, NTND, BS+  Extremities: No edema  Skin: Warm and dry  Neurological: No gross focal deficits    HOSPITAL MEDICATIONS:  MEDICATIONS  (STANDING):  albuterol/ipratropium for Nebulization 3 milliLiter(s) Nebulizer every 6 hours  apixaban 5 milliGRAM(s) Oral every 12 hours  atorvastatin 20 milliGRAM(s) Oral at bedtime  buDESOnide    Inhalation Suspension 0.5 milliGRAM(s) Inhalation two times a day  chlorhexidine 4% Liquid 1 Application(s) Topical daily  dextrose 5%. 1000 milliLiter(s) (100 mL/Hr) IV Continuous <Continuous>  dextrose 5%. 1000 milliLiter(s) (50 mL/Hr) IV Continuous <Continuous>  dextrose 50% Injectable 25 Gram(s) IV Push once  dextrose 50% Injectable 12.5 Gram(s) IV Push once  dextrose 50% Injectable 25 Gram(s) IV Push once  glucagon  Injectable 1 milliGRAM(s) IntraMuscular once  insulin glargine Injectable (LANTUS) 32 Unit(s) SubCutaneous at bedtime  insulin lispro (ADMELOG) corrective regimen sliding scale   SubCutaneous three times a day before meals  insulin lispro (ADMELOG) corrective regimen sliding scale   SubCutaneous at bedtime  insulin lispro Injectable (ADMELOG) 16 Unit(s) SubCutaneous three times a day with meals  linezolid    Tablet 600 milliGRAM(s) Oral every 12 hours  mexiletine 200 milliGRAM(s) Oral every 8 hours  nystatin    Suspension 550841 Unit(s) Swish and Swallow two times a day  pantoprazole    Tablet 40 milliGRAM(s) Oral before breakfast  polyethylene glycol 3350 17 Gram(s) Oral two times a day  predniSONE   Tablet 40 milliGRAM(s) Oral daily  senna 2 Tablet(s) Oral at bedtime  sodium chloride 7% Inhalation 4 milliLiter(s) Inhalation every 6 hours  tiotropium 2.5 MICROgram(s) Inhaler 2 Puff(s) Inhalation daily  trimethoprim  160 mG/sulfamethoxazole 800 mG 1 Tablet(s) Oral daily    MEDICATIONS  (PRN):  acetaminophen     Tablet .. 650 milliGRAM(s) Oral every 6 hours PRN Temp greater or equal to 38C (100.4F), Mild Pain (1 - 3)  aluminum hydroxide/magnesium hydroxide/simethicone Suspension 30 milliLiter(s) Oral every 4 hours PRN Dyspepsia  dextrose Oral Gel 15 Gram(s) Oral once PRN Blood Glucose LESS THAN 70 milliGRAM(s)/deciliter  diphenhydrAMINE 25 milliGRAM(s) Oral daily PRN Allergy symptoms  melatonin 3 milliGRAM(s) Oral at bedtime PRN Insomnia  ondansetron Injectable 4 milliGRAM(s) IV Push every 8 hours PRN Nausea and/or Vomiting      LABS:                        7.5    12.04 )-----------( 212      ( 21 Aug 2023 07:26 )             26.0     Hgb Trend: 7.5<--, 8.5<--, 8.8<--, 8.1<--, 8.6<--  08-21    140  |  106  |  23  ----------------------------<  170<H>  4.5   |  22  |  0.70    Ca    8.7      21 Aug 2023 07:29  Phos  3.1     08-21  Mg     2.1     08-21    TPro  5.0<L>  /  Alb  3.2<L>  /  TBili  0.7  /  DBili  x   /  AST  55<H>  /  ALT  21  /  AlkPhos  62  08-21    Creatinine Trend: 0.70<--, 0.75<--, 0.65<--, 0.79<--, 0.72<--, 0.73<--    Urinalysis Basic - ( 21 Aug 2023 07:29 )    Color: x / Appearance: x / SG: x / pH: x  Gluc: 170 mg/dL / Ketone: x  / Bili: x / Urobili: x   Blood: x / Protein: x / Nitrite: x   Leuk Esterase: x / RBC: x / WBC x   Sq Epi: x / Non Sq Epi: x / Bacteria: x            MICROBIOLOGY:     Culture - Sputum, Cystic Fibrosis (collected 18 Aug 2023 23:27)  Source: .Sputum Sputum  Gram Stain (19 Aug 2023 03:39):    No polymorphonuclear leukocytes per low power field    No Squamous epithelial cells per low power field    No organisms seen per oil power field  Preliminary Report (20 Aug 2023 18:13):    Rare Staphylococcus aureus    Rare Normal Respiratory Jessica present        RADIOLOGY:  [ ] Reviewed and interpreted by me    PULMONARY FUNCTION TESTS:    EKG:

## 2023-08-21 NOTE — PROGRESS NOTE ADULT - SUBJECTIVE AND OBJECTIVE BOX
Lázaro Molina, PGY-1  Please contact on Teams    RAYRAY RODRIGUEZ  72y  Female    Reason for Admission:       SUBJECTIVE/INTERVAL EVENTS: No acute events. Pt seen and examined at bedside.    PHYSICAL EXAM:  GENERAL: NAD, lying comfortably in bed   HEAD:  Atraumatic, Normocephalic  EYES: EOMI b/l, PERRLA b/l, conjunctiva and sclera clear  NECK: Supple, No JVD, No LAD   CHEST/LUNG: coarse breath sounds, similar to prior  HEART: Regular rate and rhythm; S1 and S2 present  ABDOMEN: Soft, Nontender, Nondistended; Bowel sounds present  EXTREMITIES:  No clubbing, cyanosis, or edema  NEURO: AAOx3, non-focal   SKIN: No rashes or       Vital Signs Last 24 Hrs  T(C): 36.6 (20 Aug 2023 19:51), Max: 36.8 (20 Aug 2023 12:45)  T(F): 97.9 (20 Aug 2023 19:51), Max: 98.2 (20 Aug 2023 12:45)  HR: 84 (20 Aug 2023 19:51) (78 - 84)  BP: 129/53 (20 Aug 2023 19:51) (113/55 - 129/53)  BP(mean): --  RR: 18 (20 Aug 2023 19:51) (18 - 18)  SpO2: 99% (20 Aug 2023 19:51) (99% - 100%)    Parameters below as of 20 Aug 2023 19:51  Patient On (Oxygen Delivery Method): room air        Consultant(s) Notes Reviewed:  [x] YES  [ ] NO  Care Discussed with Consultants/Other Providers [x] YES  [ ] NO    LABS:                        8.5    13.76 )-----------( 207      ( 20 Aug 2023 06:30 )             29.5                         8.8    14.13 )-----------( 215      ( 19 Aug 2023 15:25 )             31.0         Urinalysis Basic - ( 20 Aug 2023 06:30 )    Color: x / Appearance: x / SG: x / pH: x  Gluc: 85 mg/dL / Ketone: x  / Bili: x / Urobili: x   Blood: x / Protein: x / Nitrite: x   Leuk Esterase: x / RBC: x / WBC x   Sq Epi: x / Non Sq Epi: x / Bacteria: x    .Sputum Sputum  08-18 @ 23:27   Rare Staphylococcus aureus  Rare Normal Respiratory Jessica present  --    No polymorphonuclear leukocytes per low power field  No Squamous epithelial cells per low power field  No organisms seen per oil power field      .Sputum Sputum  08-18 @ 10:43   Normal Respiratory Jessica present  --    No polymorphonuclear leukocytes per low power field  Rare Squamous epithelial cells per low power field  Moderate Gram positive cocci in pairs per oil power field  Few Gram Negative Rods per oil power field      .Blood Blood-Peripheral  08-17 @ 09:35   No growth at 72 Hours  --  --      .Blood Blood-Peripheral  08-17 @ 09:30   No growth at 72 Hours  --  --      .Sputum Sputum  08-17 @ 01:18   Moderate Methicillin Resistant Staphylococcus aureus  Rare Pantoea agglomerans  Numerous Normal Respiratory Jessica present  --  Methicillin resistant Staphylococcus aureus  Pantoea agglomerans (Previousley Enterobacter)      .Sputum Sputum  08-15 @ 12:49   Normal Respiratory Jessica present  --    Rare Squamous epithelial cells per low power field  Rare polymorphonuclear leukocytes per low power field  Rare Yeast like cells per oil power field      .Sputum Sputum  08-14 @ 13:11   Culture is being performed.  --  --      .Blood Blood-Peripheral  08-11 @ 16:00   No growth at 5 days  --  --      .Blood Blood-Peripheral  08-11 @ 15:15   No growth at 5 days  --  --      .Sputum Sputum  06-09 @ 16:18   Numerous Proteus mirabilis ESBL  Normal Respiratory Jessica present  --  Proteus mirabilis ESBL      .Blood Blood-Peripheral  06-05 @ 04:00   No Growth Final  --  --      Clean Catch Clean Catch (Midstream)  06-05 @ 03:40   10,000 - 49,000 CFU/mL Proteus mirabilis ESBL  <10,000 CFU/ml Normal Urogenital jessica present  --  Proteus mirabilis ESBL          RADIOLOGY & ADDITIONAL TESTS:    Imaging Personally Reviewed:  [x] YES  [ ] NO    MEDICATIONS  (STANDING):  albuterol/ipratropium for Nebulization 3 milliLiter(s) Nebulizer every 6 hours  apixaban 5 milliGRAM(s) Oral every 12 hours  atorvastatin 20 milliGRAM(s) Oral at bedtime  buDESOnide    Inhalation Suspension 0.5 milliGRAM(s) Inhalation two times a day  chlorhexidine 4% Liquid 1 Application(s) Topical daily  dextrose 5%. 1000 milliLiter(s) (50 mL/Hr) IV Continuous <Continuous>  dextrose 5%. 1000 milliLiter(s) (100 mL/Hr) IV Continuous <Continuous>  dextrose 50% Injectable 12.5 Gram(s) IV Push once  dextrose 50% Injectable 25 Gram(s) IV Push once  dextrose 50% Injectable 25 Gram(s) IV Push once  glucagon  Injectable 1 milliGRAM(s) IntraMuscular once  insulin glargine Injectable (LANTUS) 36 Unit(s) SubCutaneous at bedtime  insulin lispro (ADMELOG) corrective regimen sliding scale   SubCutaneous three times a day before meals  insulin lispro (ADMELOG) corrective regimen sliding scale   SubCutaneous at bedtime  insulin lispro Injectable (ADMELOG) 16 Unit(s) SubCutaneous three times a day with meals  linezolid    Tablet 600 milliGRAM(s) Oral every 12 hours  mexiletine 200 milliGRAM(s) Oral every 8 hours  nystatin    Suspension 496367 Unit(s) Swish and Swallow two times a day  pantoprazole    Tablet 40 milliGRAM(s) Oral before breakfast  polyethylene glycol 3350 17 Gram(s) Oral two times a day  predniSONE   Tablet 40 milliGRAM(s) Oral daily  senna 2 Tablet(s) Oral at bedtime  sodium chloride 7% Inhalation 4 milliLiter(s) Inhalation every 6 hours  tiotropium 2.5 MICROgram(s) Inhaler 2 Puff(s) Inhalation daily  trimethoprim  160 mG/sulfamethoxazole 800 mG 1 Tablet(s) Oral daily    MEDICATIONS  (PRN):  acetaminophen     Tablet .. 650 milliGRAM(s) Oral every 6 hours PRN Temp greater or equal to 38C (100.4F), Mild Pain (1 - 3)  aluminum hydroxide/magnesium hydroxide/simethicone Suspension 30 milliLiter(s) Oral every 4 hours PRN Dyspepsia  dextrose Oral Gel 15 Gram(s) Oral once PRN Blood Glucose LESS THAN 70 milliGRAM(s)/deciliter  diphenhydrAMINE 25 milliGRAM(s) Oral daily PRN Allergy symptoms  melatonin 3 milliGRAM(s) Oral at bedtime PRN Insomnia  ondansetron Injectable 4 milliGRAM(s) IV Push every 8 hours PRN Nausea and/or Vomiting      HEALTH ISSUES - PROBLEM Dx:  Acute exacerbation of bronchiectasis    Acute asthma exacerbation    SIRS without infection or organ dysfunction    Atrial fibrillation  paroxysmal, on eliquis    Diabetes mellitus    Prophylactic measure    Uncontrolled type 2 diabetes mellitus with hyperglycemia    Essential hypertension    Hyperlipidemia

## 2023-08-21 NOTE — PROGRESS NOTE ADULT - ASSESSMENT
71 yo PMHx  asthma on chronic steroids, bronchiectasis s/p surgery, allergic rhinitis,  recurrent PNA, PND, tracheomalacia s/p tracheoplasty, ESBL proteus infections (sputum),  diabetes, paroxysmal A-fib on Eliquis, colorectal cancer many years ago status post colostomy presenting with concern for shortness of breath. Prior issues with ESBL/Proteus/yeast in sputum culture and was treated with ertapenem/Benadryl/vancomycin/aztreonam and methylprednisolone.   She was discharged home with a midline and completed a course of antibiotics ertapenem (last dose 06/25) PT now readmitted with severe dyspnea and reports that having had asthma for 61 years she feels like she needs an antibiotic. Reports not tolerating meropenem. Patient follows with Dr Allison who knows this complicated pt very well  Noted  pt has PCN, cefepime allergies    RECOMMENDATIONS  -no evidence of airspace disease so suspect likely Bronchiectasis exacerbation  -recent sputums with ESBL proteus so having tolerated ertapenem and was restarted   -Repeat CXR with no pneumonia   -Fungitell negative     -8/15 Sputum culture with normal resp val   -8/14 Sputum AFB smear negative -f/u culture to rule out DIEUDONNE  -8/17 CF sputum culture with mod Staph aureus-MRSA, Pantoea (previously Enterobacter) and normal resp val   - sensitivities noted; recently completed course of ertapenem 8/12-8/18, now on linezolid     -leukocytosis trend noted - suspect may be reactive in setting of steroids   - no new complaints or focal findings on exam; remains afebrile    Pulmonary following - changed to po steroid 8/19  Follow AFB, fungal sputum cultures   Continue linezolid day 4 of 7  S/p ertapenem (8/12-8/18  Supportive care, chest PT, neb treatments   Monitor temps/CBC      Tello Fernandez M.D.  [facility], Division of Infectious Diseases  [phone]  After 5pm on weekdays and all day on weekends - please call [phone]

## 2023-08-21 NOTE — PROGRESS NOTE ADULT - ASSESSMENT
72 F w/h/o uncontrolled T2DM (A1C 9.4%) on basal/bolus insulin PTA. Unknown DM complications. Also COPD, secondary adrenal Insufficiency on chronic steroids, colorectal cancer s/p resection (colostomy bag), Chronic A fib on Eliquis, and tracheomalacia and multiple intubations, type A aortic dissection s/p aortic dissection repair on 9/07/22, sepsis. Pt well known to this provider from prior admissions. Here with SOB> treated for PNA and on steroid pulse coming down to Prednisone 40mg tomorrow. Endocrine consulted for assistance with uncontrolled DM/steroids for AI on steroid pulse for SOB .

## 2023-08-21 NOTE — PROGRESS NOTE ADULT - PROBLEM SELECTOR PLAN 1
Test BG ac and hs and 2am while on steroids    Prednisone 40 mg daily    Decrease Lantus dose to 32 units qhs for now  Decrease Admelog 14 units qac for now. If BG <100 later today decrease dose to 12 units. Will follow  C/w Mod correction scale qac + bedtime  Will adjust as needed. Expecting BG to improve once on lower Prednisone doses   Discharge:  Will be determined according to BG/insulin needs/PO intake and steroid therapy at time of discharge.  Plan to d/c on basal bolus. Doses TBD  Outpt. endo follow-up. Dr Saavedra  Outpt. optho, podiatry, micro/cr  Make sure pt has Rx for all DM supplies and insulin/ DM meds.

## 2023-08-21 NOTE — PROGRESS NOTE ADULT - SUBJECTIVE AND OBJECTIVE BOX
Chief Complaint/Follow-up on: T2 DM     Subjective: She feels well. Complains about not getting a bedtime snack - and resultant BG 88 overnight. She is afraid of hypoglycemia and would like to have a bedtime snack. Has good appetite.       ROS:   GENERAL: Denies pain, has good appetite  GI: No nausea/vomiting  RESPI: No cough, shortness of breath  CV: No chest pain, palpitations     MEDICATIONS  (STANDING):  albuterol/ipratropium for Nebulization 3 milliLiter(s) Nebulizer every 6 hours  apixaban 5 milliGRAM(s) Oral every 12 hours  atorvastatin 20 milliGRAM(s) Oral at bedtime  buDESOnide    Inhalation Suspension 0.5 milliGRAM(s) Inhalation two times a day  chlorhexidine 4% Liquid 1 Application(s) Topical daily  dextrose 5%. 1000 milliLiter(s) (100 mL/Hr) IV Continuous <Continuous>  dextrose 5%. 1000 milliLiter(s) (50 mL/Hr) IV Continuous <Continuous>  dextrose 50% Injectable 12.5 Gram(s) IV Push once  dextrose 50% Injectable 25 Gram(s) IV Push once  dextrose 50% Injectable 25 Gram(s) IV Push once  glucagon  Injectable 1 milliGRAM(s) IntraMuscular once  insulin glargine Injectable (LANTUS) 32 Unit(s) SubCutaneous at bedtime  insulin lispro (ADMELOG) corrective regimen sliding scale   SubCutaneous three times a day before meals  insulin lispro (ADMELOG) corrective regimen sliding scale   SubCutaneous at bedtime  insulin lispro Injectable (ADMELOG) 14 Unit(s) SubCutaneous three times a day with meals  linezolid    Tablet 600 milliGRAM(s) Oral every 12 hours  mexiletine 200 milliGRAM(s) Oral every 8 hours  nystatin    Suspension 314923 Unit(s) Swish and Swallow two times a day  pantoprazole    Tablet 40 milliGRAM(s) Oral before breakfast  polyethylene glycol 3350 17 Gram(s) Oral two times a day  predniSONE   Tablet 40 milliGRAM(s) Oral daily  senna 2 Tablet(s) Oral at bedtime  sodium chloride 7% Inhalation 4 milliLiter(s) Inhalation every 6 hours  tiotropium 2.5 MICROgram(s) Inhaler 2 Puff(s) Inhalation daily  trimethoprim  160 mG/sulfamethoxazole 800 mG 1 Tablet(s) Oral daily    MEDICATIONS  (PRN):  acetaminophen     Tablet .. 650 milliGRAM(s) Oral every 6 hours PRN Temp greater or equal to 38C (100.4F), Mild Pain (1 - 3)  aluminum hydroxide/magnesium hydroxide/simethicone Suspension 30 milliLiter(s) Oral every 4 hours PRN Dyspepsia  dextrose Oral Gel 15 Gram(s) Oral once PRN Blood Glucose LESS THAN 70 milliGRAM(s)/deciliter  diphenhydrAMINE 25 milliGRAM(s) Oral daily PRN Allergy symptoms  melatonin 3 milliGRAM(s) Oral at bedtime PRN Insomnia  ondansetron Injectable 4 milliGRAM(s) IV Push every 8 hours PRN Nausea and/or Vomiting      PHYSICAL EXAM:  VITALS: T(C): 36.6 (08-20-23 @ 19:51)  T(F): 97.9 (08-20-23 @ 19:51), Max: 97.9 (08-20-23 @ 19:51)  HR: 84 (08-20-23 @ 19:51) (84 - 84)  BP: 129/53 (08-20-23 @ 19:51) (129/53 - 129/53)  RR:  (18 - 18)  SpO2:  (99% - 99%)  Wt(kg): --  GENERAL: NAD, well-groomed, well-developed  EYES: No proptosis, no injection  PSYCH: normal mood, normal affect  NEURO: alert, oriented, normal speech     POCT Blood Glucose.: 91 mg/dL (08-21-23 @ 13:00)  POCT Blood Glucose.: 139 mg/dL (08-21-23 @ 08:49)  POCT Blood Glucose.: 88 mg/dL (08-21-23 @ 01:52)  POCT Blood Glucose.: 146 mg/dL (08-20-23 @ 21:31)  POCT Blood Glucose.: 388 mg/dL (08-20-23 @ 17:19)  POCT Blood Glucose.: 261 mg/dL (08-20-23 @ 12:42)  POCT Blood Glucose.: 79 mg/dL (08-20-23 @ 08:57)  POCT Blood Glucose.: 190 mg/dL (08-19-23 @ 21:32)  POCT Blood Glucose.: 287 mg/dL (08-19-23 @ 17:30)  POCT Blood Glucose.: 83 mg/dL (08-19-23 @ 12:59)  POCT Blood Glucose.: 91 mg/dL (08-19-23 @ 08:59)  POCT Blood Glucose.: 151 mg/dL (08-18-23 @ 21:59)  POCT Blood Glucose.: 235 mg/dL (08-18-23 @ 17:22)        08-21    140  |  106  |  23  ----------------------------<  170<H>  4.5   |  22  |  0.70    eGFR: 91    Ca    8.7      08-21  Mg     2.1     08-21  Phos  3.1     08-21    TPro  5.0<L>  /  Alb  3.2<L>  /  TBili  0.7  /  DBili  x   /  AST  55<H>  /  ALT  21  /  AlkPhos  62  08-21

## 2023-08-21 NOTE — PROGRESS NOTE ADULT - ATTENDING COMMENTS
This is a 72F with h/o bronchiectasis on chronic steroid, s/p surgery, allergic rhinitis,  recurrent PNA, PND, tracheomalacia s/p tracheoplasty, ESBL proteus infections (sputum), T2DM, paroxysmal A-fib on Eliquis, colorectal cancer many years ago status post colostomy presenting with shortness of breath for 3 days. Prior issues with ESBL/Proteus/yeast in sputum culture and was treated with ertapenem/Benadryl/vancomycin/aztreonam and methylprednisolone. She was discharged home with a midline and completed a course of antibiotics ertapenem (last dose 06/25). Recently she was treated for MRSA and pseudomonas in sputum and completed Tobra nebs and doxycycline.    1. Acute on chronic hypoxic RF due to exacerbation of bronchiectasis (on chronic steroid)  2. Recent tracheo-bronchitis due to ESBL proteus/MRSA/pseudomonas  3. Paroxysmal A-fib on Eliquis  4. H/o colorectal cancer, status post colostomy    - Afebrile, less SOB, WBC 12 , O2 sat %% RA in rest & on ambulation  - repeat sputum c/s grew MRSA, now on IV Zyvox for 7 days  - Pulmonary plans noted- on bronchodilators, PO prednisone taper, inhaled steroid, s/p IV Ertapenem and Hypertonic saline to 7% for airway clearance device (patient brought her own from home) and chest PT as tolerated   - CT chest shows unchanged infectious/inflammatory small airways disease in the right middle/lower lobes with multifocal small tree-in-bud nodules. Status post ascending aortic and aortic valve replacement, with residual dissection flap better seen on the prior study. Stable indeterminate small left renal nodule and numerous pancreatic cysts.  - LDH 1382, Fungitell neg, already on Bactrim ppx  - Echo bubble study no shunt, repeat echo ordered to see transvalvular gradient post TAVR  - c/w Eliquis,   - Endocrine f/u plans noted- on basal/bolus insulins FS elevated due to steroid  - Her outpatient Pul- Dr Allison is aware.

## 2023-08-21 NOTE — PROGRESS NOTE ADULT - SUBJECTIVE AND OBJECTIVE BOX
[facility] DIVISION OF INFECTIOUS DISEASES  GISSELL Uriostegui Y. Patel, S. Shah, G. Terrell  [phone]  ([phone] - weekdays after 5pm and weekends)    Name: RAYRAY RODRIGUEZ  Age/Gender: 72y Female  MRN: [id number]    Interval History:  Patient seen and examined this morning.   No new complaints  Notes reviewed.   No concerning overnight events.  Afebrile.     Allergies: tetanus toxoid (Short breath)  cefepime (Anaphylaxis)  penicillin (Anaphylaxis)  Avelox (Short breath; Pruritus)  Dilaudid (Short breath)  codeine (Short breath)  aspirin (Short breath)  iodine (Short breath; Swelling)  Valium (Short breath)  shellfish (Anaphylaxis)      Objective:  Vitals:   T(F): 97.9 (08-20-23 @ 19:51), Max: 97.9 (08-20-23 @ 19:51)  HR: 84 (08-20-23 @ 19:51) (84 - 84)  BP: 129/53 (08-20-23 @ 19:51) (129/53 - 129/53)  RR: 18 (08-20-23 @ 19:51) (18 - 18)  SpO2: 99% (08-20-23 @ 19:51) (99% - 99%)  Physical Examination:  General: no acute distress  HEENT: NC/AT, EOMI, anicteric, neck supple  Cardio: S1, S2 present, normal rate  Resp: decreased breath sounds b/l, coughing  Abd: soft, NT, ND, + BS  Neuro: AAOx3, no obvious focal deficits  Ext: no edema, no cyanosis, moving extremities  Skin: warm, dry, no visible rash  Psych: appropriate affect and mood for situation  Lines: PIV    Laboratory Studies:  CBC:                       7.5    12.04 )-----------( 212      ( 21 Aug 2023 07:26 )             26.0     WBC Trend:  12.04 08-21-23 @ 07:26  13.76 08-20-23 @ 06:30  14.13 08-19-23 @ 15:25  15.59 08-17-23 @ 06:32  13.87 08-16-23 @ 06:30  20.91 08-15-23 @ 07:04    CMP: 08-21    140  |  106  |  23  ----------------------------<  170<H>  4.5   |  22  |  0.70    Ca    8.7      21 Aug 2023 07:29  Phos  3.1     08-21  Mg     2.1     08-21    TPro  5.0<L>  /  Alb  3.2<L>  /  TBili  0.7  /  DBili  x   /  AST  55<H>  /  ALT  21  /  AlkPhos  62  08-21    Creatinine: 0.70 mg/dL (08-21-23 @ 07:29)  Creatinine: 0.75 mg/dL (08-20-23 @ 06:30)  Creatinine: 0.65 mg/dL (08-19-23 @ 15:25)  Creatinine: 0.79 mg/dL (08-18-23 @ 10:39)  Creatinine: 0.72 mg/dL (08-17-23 @ 06:32)  Creatinine: 0.73 mg/dL (08-16-23 @ 06:31)  Creatinine: 0.71 mg/dL (08-15-23 @ 07:02)    LIVER FUNCTIONS - ( 21 Aug 2023 07:29 )  Alb: 3.2 g/dL / Pro: 5.0 g/dL / ALK PHOS: 62 U/L / ALT: 21 U/L / AST: 55 U/L / GGT: x           Microbiology: reviewed   Culture - Sputum, Cystic Fibrosis (collected 08-18-23 @ 23:27)  Source: .Sputum Sputum  Gram Stain (08-19-23 @ 03:39):    No polymorphonuclear leukocytes per low power field    No Squamous epithelial cells per low power field    No organisms seen per oil power field  Preliminary Report (08-20-23 @ 18:13):    Rare Staphylococcus aureus    Rare Normal Respiratory Jessica present    Culture - Sputum (collected 08-18-23 @ 10:43)  Source: .Sputum Sputum  Gram Stain (08-18-23 @ 20:40):    No polymorphonuclear leukocytes per low power field    Rare Squamous epithelial cells per low power field    Moderate Gram positive cocci in pairs per oil power field    Few Gram Negative Rods per oil power field  Final Report (08-20-23 @ 19:06):    Normal Respiratory Jessica present    Culture - Blood (collected 08-17-23 @ 09:35)  Source: .Blood Blood-Peripheral  Preliminary Report (08-20-23 @ 18:01):    No growth at 72 Hours    Culture - Blood (collected 08-17-23 @ 09:30)  Source: .Blood Blood-Peripheral  Preliminary Report (08-20-23 @ 18:01):    No growth at 72 Hours    Culture - Sputum, Cystic Fibrosis (collected 08-17-23 @ 01:18)  Source: .Sputum Sputum  Gram Stain (08-17-23 @ 10:15):    Rare Squamous epithelial cells per low power field    No polymorphonuclear leukocytes per low power field    Rare Gram positive cocci in pairs per oil power field  Final Report (08-21-23 @ 09:41):    Moderate Methicillin Resistant Staphylococcus aureus    Rare Pantoea agglomerans    Numerous Normal Respiratory Jessica present  Organism: Methicillin resistant Staphylococcus aureus  Pantoea agglomerans (Previousley Enterobacter) (08-21-23 @ 09:41)  Organism: Pantoea agglomerans (Previousley Enterobacter) (08-21-23 @ 09:41)      Method Type: GARRETT      -  Amikacin: S <=16      -  Amoxicillin/Clavulanic Acid: S <=8/4      -  Ampicillin: S <=8 These ampicillin results predict results for amoxicillin      -  Ampicillin/Sulbactam: S <=4/2 Enterobacter, Klebsiella aerogenes, Citrobacter, and Serratia may develop resistance during prolonged therapy (3-4 days)      -  Aztreonam: S <=4      -  Cefazolin: S <=2 Enterobacter, Klebsiella aerogenes, Citrobacter, and Serratia may develop resistance during prolonged therapy (3-4 days)      -  Cefepime: S <=2      -  Cefoxitin: S <=8      -  Ceftriaxone: S <=1 Enterobacter, Klebsiella aerogenes, Citrobacter, and Serratia may develop resistance during prolonged therapy      -  Ciprofloxacin: S <=0.25      -  Ertapenem: S <=0.5      -  Gentamicin: S <=2      -  Levofloxacin: S <=0.5      -  Meropenem: S <=1      -  Piperacillin/Tazobactam: S <=8      -  Tobramycin: S <=2      -  Trimethoprim/Sulfamethoxazole: S <=0.5/9.5  Organism: Methicillin resistant Staphylococcus aureus (08-21-23 @ 09:41)      Method Type: GARRETT      -  Ampicillin/Sulbactam: R <=8/4      -  Cefazolin: R >16      -  Clindamycin: S <=0.25      -  Erythromycin: R >4      -  Gentamicin: S <=1 Should not be used as monotherapy      -  Linezolid: S 2      -  Oxacillin: R >2      -  Penicillin: R >8      -  Rifampin: S <=1 Should not be used as monotherapy      -  Tetracycline: S 2      -  Trimethoprim/Sulfamethoxazole: R >2/38      -  Vancomycin: S 2    Culture - Sputum (collected 08-15-23 @ 12:49)  Source: .Sputum Sputum  Gram Stain (08-15-23 @ 21:07):    Rare Squamous epithelial cells per low power field    Rare polymorphonuclear leukocytes per low power field    Rare Yeast like cells per oil power field  Final Report (08-17-23 @ 18:08):    Normal Respiratory Jessica present    Culture - Acid Fast - Sputum w/Smear (collected 08-14-23 @ 13:11)  Source: .Sputum Sputum  Preliminary Report (08-19-23 @ 15:07):    Culture is being performed.    Culture - Blood (collected 08-11-23 @ 16:00)  Source: .Blood Blood-Peripheral  Final Report (08-16-23 @ 20:00):    No growth at 5 days    Culture - Blood (collected 08-11-23 @ 15:15)  Source: .Blood Blood-Peripheral  Final Report (08-16-23 @ 20:00):    No growth at 5 days    SARS-CoV-2 Result: NotDete (11 Aug 2023 16:06)    Radiology: reviewed     Medications:  acetaminophen     Tablet .. 650 milliGRAM(s) Oral every 6 hours PRN  albuterol/ipratropium for Nebulization 3 milliLiter(s) Nebulizer every 6 hours  aluminum hydroxide/magnesium hydroxide/simethicone Suspension 30 milliLiter(s) Oral every 4 hours PRN  apixaban 5 milliGRAM(s) Oral every 12 hours  atorvastatin 20 milliGRAM(s) Oral at bedtime  buDESOnide    Inhalation Suspension 0.5 milliGRAM(s) Inhalation two times a day  chlorhexidine 4% Liquid 1 Application(s) Topical daily  dextrose 5%. 1000 milliLiter(s) IV Continuous <Continuous>  dextrose 5%. 1000 milliLiter(s) IV Continuous <Continuous>  dextrose 50% Injectable 12.5 Gram(s) IV Push once  dextrose 50% Injectable 25 Gram(s) IV Push once  dextrose 50% Injectable 25 Gram(s) IV Push once  dextrose Oral Gel 15 Gram(s) Oral once PRN  diphenhydrAMINE 25 milliGRAM(s) Oral daily PRN  glucagon  Injectable 1 milliGRAM(s) IntraMuscular once  insulin glargine Injectable (LANTUS) 32 Unit(s) SubCutaneous at bedtime  insulin lispro (ADMELOG) corrective regimen sliding scale   SubCutaneous three times a day before meals  insulin lispro (ADMELOG) corrective regimen sliding scale   SubCutaneous at bedtime  insulin lispro Injectable (ADMELOG) 16 Unit(s) SubCutaneous three times a day with meals  linezolid    Tablet 600 milliGRAM(s) Oral every 12 hours  melatonin 3 milliGRAM(s) Oral at bedtime PRN  mexiletine 200 milliGRAM(s) Oral every 8 hours  nystatin    Suspension 172362 Unit(s) Swish and Swallow two times a day  ondansetron Injectable 4 milliGRAM(s) IV Push every 8 hours PRN  pantoprazole    Tablet 40 milliGRAM(s) Oral before breakfast  polyethylene glycol 3350 17 Gram(s) Oral two times a day  predniSONE   Tablet 40 milliGRAM(s) Oral daily  senna 2 Tablet(s) Oral at bedtime  sodium chloride 7% Inhalation 4 milliLiter(s) Inhalation every 6 hours  tiotropium 2.5 MICROgram(s) Inhaler 2 Puff(s) Inhalation daily  trimethoprim  160 mG/sulfamethoxazole 800 mG 1 Tablet(s) Oral daily    Current Antimicrobials:  linezolid    Tablet 600 milliGRAM(s) Oral every 12 hours  nystatin    Suspension 080018 Unit(s) Swish and Swallow two times a day  trimethoprim  160 mG/sulfamethoxazole 800 mG 1 Tablet(s) Oral daily    Prior/Completed Antimicrobials:  ertapenem  IVPB  ertapenem  IVPB

## 2023-08-21 NOTE — PROGRESS NOTE ADULT - ASSESSMENT
Patient is a 71 yo F w/ asthma (chronic steroids), bronchiectasis, allergic rhinitis, recurrent PNA, PND, tracheomalacia s/p tracheoplasty, tracheobronchomalacia, pAfib (Eliquis), colorectal ca s/p colostomy, aortic dissection s/p ascending aorta repair who p/w acute on chronic shortness of breath in the setting of recent completion of abx and tapering of her steroids as an outpatient.     #SOB - Likely 2/2 bronchiectasis flare and asthma exacerbation  #Bronchiectasis flare  #Acute Asthma exacerbation  - ID recs appreciated, s/p Ertapenem, currently on 7 day course of Linezolid  - c/w albuterol/7% with airway clearance q6h  - Decrease Prednisone to 30mg PO daily, anticipate extended taper decreasing by 10mg per week until back at maintenance dose  - c/w Budesonide 0.5mg q12h   - c/w Bactrim for PJP ppx  - PT/OT, OOB to chair as much as possible  - Consider repeat TTE for better evaluation of TAVR if possible    Shaan Shah MD  Pulmonary & Critical Care

## 2023-08-22 DIAGNOSIS — Z86.39 PERSONAL HISTORY OF OTHER ENDOCRINE, NUTRITIONAL AND METABOLIC DISEASE: ICD-10-CM

## 2023-08-22 LAB
ALBUMIN SERPL ELPH-MCNC: 3.1 G/DL — LOW (ref 3.3–5)
ALP SERPL-CCNC: 58 U/L — SIGNIFICANT CHANGE UP (ref 40–120)
ALT FLD-CCNC: 23 U/L — SIGNIFICANT CHANGE UP (ref 10–45)
ANION GAP SERPL CALC-SCNC: 9 MMOL/L — SIGNIFICANT CHANGE UP (ref 5–17)
ANISOCYTOSIS BLD QL: SIGNIFICANT CHANGE UP
AST SERPL-CCNC: 57 U/L — HIGH (ref 10–40)
BASOPHILS # BLD AUTO: 0 K/UL — SIGNIFICANT CHANGE UP (ref 0–0.2)
BASOPHILS NFR BLD AUTO: 0 % — SIGNIFICANT CHANGE UP (ref 0–2)
BILIRUB SERPL-MCNC: 0.7 MG/DL — SIGNIFICANT CHANGE UP (ref 0.2–1.2)
BUN SERPL-MCNC: 23 MG/DL — SIGNIFICANT CHANGE UP (ref 7–23)
CALCIUM SERPL-MCNC: 8.7 MG/DL — SIGNIFICANT CHANGE UP (ref 8.4–10.5)
CHLORIDE SERPL-SCNC: 107 MMOL/L — SIGNIFICANT CHANGE UP (ref 96–108)
CO2 SERPL-SCNC: 26 MMOL/L — SIGNIFICANT CHANGE UP (ref 22–31)
CREAT SERPL-MCNC: 0.76 MG/DL — SIGNIFICANT CHANGE UP (ref 0.5–1.3)
CULTURE RESULTS: SIGNIFICANT CHANGE UP
DACRYOCYTES BLD QL SMEAR: SLIGHT — SIGNIFICANT CHANGE UP
EGFR: 82 ML/MIN/1.73M2 — SIGNIFICANT CHANGE UP
ELLIPTOCYTES BLD QL SMEAR: SLIGHT — SIGNIFICANT CHANGE UP
EOSINOPHIL # BLD AUTO: 0 K/UL — SIGNIFICANT CHANGE UP (ref 0–0.5)
EOSINOPHIL NFR BLD AUTO: 0 % — SIGNIFICANT CHANGE UP (ref 0–6)
GLUCOSE BLDC GLUCOMTR-MCNC: 137 MG/DL — HIGH (ref 70–99)
GLUCOSE BLDC GLUCOMTR-MCNC: 137 MG/DL — HIGH (ref 70–99)
GLUCOSE BLDC GLUCOMTR-MCNC: 167 MG/DL — HIGH (ref 70–99)
GLUCOSE BLDC GLUCOMTR-MCNC: 296 MG/DL — HIGH (ref 70–99)
GLUCOSE BLDC GLUCOMTR-MCNC: 342 MG/DL — HIGH (ref 70–99)
GLUCOSE BLDC GLUCOMTR-MCNC: 421 MG/DL — HIGH (ref 70–99)
GLUCOSE SERPL-MCNC: 135 MG/DL — HIGH (ref 70–99)
HCT VFR BLD CALC: 25.4 % — LOW (ref 34.5–45)
HGB BLD-MCNC: 7.3 G/DL — LOW (ref 11.5–15.5)
LYMPHOCYTES # BLD AUTO: 19.8 % — SIGNIFICANT CHANGE UP (ref 13–44)
LYMPHOCYTES # BLD AUTO: 2.31 K/UL — SIGNIFICANT CHANGE UP (ref 1–3.3)
MACROCYTES BLD QL: SLIGHT — SIGNIFICANT CHANGE UP
MAGNESIUM SERPL-MCNC: 2 MG/DL — SIGNIFICANT CHANGE UP (ref 1.6–2.6)
MANUAL SMEAR VERIFICATION: SIGNIFICANT CHANGE UP
MCHC RBC-ENTMCNC: 22.1 PG — LOW (ref 27–34)
MCHC RBC-ENTMCNC: 28.7 GM/DL — LOW (ref 32–36)
MCV RBC AUTO: 77 FL — LOW (ref 80–100)
MICROCYTES BLD QL: SIGNIFICANT CHANGE UP
MONOCYTES # BLD AUTO: 0.81 K/UL — SIGNIFICANT CHANGE UP (ref 0–0.9)
MONOCYTES NFR BLD AUTO: 6.9 % — SIGNIFICANT CHANGE UP (ref 2–14)
NEUTROPHILS # BLD AUTO: 8.57 K/UL — HIGH (ref 1.8–7.4)
NEUTROPHILS NFR BLD AUTO: 73.3 % — SIGNIFICANT CHANGE UP (ref 43–77)
NRBC # BLD: 3 /100 — HIGH (ref 0–0)
ORGANISM # SPEC MICROSCOPIC CNT: SIGNIFICANT CHANGE UP
ORGANISM # SPEC MICROSCOPIC CNT: SIGNIFICANT CHANGE UP
PHOSPHATE SERPL-MCNC: 3.7 MG/DL — SIGNIFICANT CHANGE UP (ref 2.5–4.5)
PLAT MORPH BLD: NORMAL — SIGNIFICANT CHANGE UP
PLATELET # BLD AUTO: 207 K/UL — SIGNIFICANT CHANGE UP (ref 150–400)
POIKILOCYTOSIS BLD QL AUTO: SIGNIFICANT CHANGE UP
POLYCHROMASIA BLD QL SMEAR: SIGNIFICANT CHANGE UP
POTASSIUM SERPL-MCNC: 4.1 MMOL/L — SIGNIFICANT CHANGE UP (ref 3.5–5.3)
POTASSIUM SERPL-SCNC: 4.1 MMOL/L — SIGNIFICANT CHANGE UP (ref 3.5–5.3)
PROT SERPL-MCNC: 4.9 G/DL — LOW (ref 6–8.3)
RBC # BLD: 3.3 M/UL — LOW (ref 3.8–5.2)
RBC # FLD: 30.2 % — HIGH (ref 10.3–14.5)
RBC BLD AUTO: ABNORMAL
SCHISTOCYTES BLD QL AUTO: SIGNIFICANT CHANGE UP
SODIUM SERPL-SCNC: 142 MMOL/L — SIGNIFICANT CHANGE UP (ref 135–145)
SPECIMEN SOURCE: SIGNIFICANT CHANGE UP
TARGETS BLD QL SMEAR: SLIGHT — SIGNIFICANT CHANGE UP
WBC # BLD: 11.69 K/UL — HIGH (ref 3.8–10.5)
WBC # FLD AUTO: 11.69 K/UL — HIGH (ref 3.8–10.5)

## 2023-08-22 PROCEDURE — 99232 SBSQ HOSP IP/OBS MODERATE 35: CPT | Mod: GC

## 2023-08-22 PROCEDURE — 99232 SBSQ HOSP IP/OBS MODERATE 35: CPT

## 2023-08-22 PROCEDURE — 99233 SBSQ HOSP IP/OBS HIGH 50: CPT

## 2023-08-22 RX ORDER — INSULIN GLARGINE 100 [IU]/ML
28 INJECTION, SOLUTION SUBCUTANEOUS AT BEDTIME
Refills: 0 | Status: DISCONTINUED | OUTPATIENT
Start: 2023-08-22 | End: 2023-08-24

## 2023-08-22 RX ORDER — INSULIN LISPRO 100/ML
VIAL (ML) SUBCUTANEOUS
Refills: 0 | Status: DISCONTINUED | OUTPATIENT
Start: 2023-08-22 | End: 2023-08-22

## 2023-08-22 RX ADMIN — PANTOPRAZOLE SODIUM 40 MILLIGRAM(S): 20 TABLET, DELAYED RELEASE ORAL at 06:35

## 2023-08-22 RX ADMIN — Medication 14 UNIT(S): at 08:57

## 2023-08-22 RX ADMIN — Medication 3 MILLILITER(S): at 00:48

## 2023-08-22 RX ADMIN — CHLORHEXIDINE GLUCONATE 1 APPLICATION(S): 213 SOLUTION TOPICAL at 13:22

## 2023-08-22 RX ADMIN — MEXILETINE HYDROCHLORIDE 200 MILLIGRAM(S): 150 CAPSULE ORAL at 13:22

## 2023-08-22 RX ADMIN — ATORVASTATIN CALCIUM 20 MILLIGRAM(S): 80 TABLET, FILM COATED ORAL at 21:37

## 2023-08-22 RX ADMIN — APIXABAN 5 MILLIGRAM(S): 2.5 TABLET, FILM COATED ORAL at 06:35

## 2023-08-22 RX ADMIN — Medication 0.5 MILLIGRAM(S): at 18:17

## 2023-08-22 RX ADMIN — MEXILETINE HYDROCHLORIDE 200 MILLIGRAM(S): 150 CAPSULE ORAL at 21:37

## 2023-08-22 RX ADMIN — POLYETHYLENE GLYCOL 3350 17 GRAM(S): 17 POWDER, FOR SOLUTION ORAL at 06:35

## 2023-08-22 RX ADMIN — Medication 40 MILLIGRAM(S): at 06:35

## 2023-08-22 RX ADMIN — Medication 14 UNIT(S): at 18:18

## 2023-08-22 RX ADMIN — LINEZOLID 600 MILLIGRAM(S): 600 INJECTION, SOLUTION INTRAVENOUS at 06:34

## 2023-08-22 RX ADMIN — Medication 3 MILLILITER(S): at 13:21

## 2023-08-22 RX ADMIN — Medication 500000 UNIT(S): at 18:17

## 2023-08-22 RX ADMIN — Medication 3 MILLILITER(S): at 06:36

## 2023-08-22 RX ADMIN — Medication 0.5 MILLIGRAM(S): at 06:36

## 2023-08-22 RX ADMIN — Medication 500000 UNIT(S): at 06:35

## 2023-08-22 RX ADMIN — SODIUM CHLORIDE 4 MILLILITER(S): 9 INJECTION INTRAMUSCULAR; INTRAVENOUS; SUBCUTANEOUS at 00:48

## 2023-08-22 RX ADMIN — Medication 1 TABLET(S): at 13:22

## 2023-08-22 RX ADMIN — SENNA PLUS 2 TABLET(S): 8.6 TABLET ORAL at 21:38

## 2023-08-22 RX ADMIN — TIOTROPIUM BROMIDE 2 PUFF(S): 18 CAPSULE ORAL; RESPIRATORY (INHALATION) at 13:21

## 2023-08-22 RX ADMIN — SODIUM CHLORIDE 4 MILLILITER(S): 9 INJECTION INTRAMUSCULAR; INTRAVENOUS; SUBCUTANEOUS at 13:21

## 2023-08-22 RX ADMIN — POLYETHYLENE GLYCOL 3350 17 GRAM(S): 17 POWDER, FOR SOLUTION ORAL at 18:18

## 2023-08-22 RX ADMIN — Medication 3 MILLILITER(S): at 18:17

## 2023-08-22 RX ADMIN — APIXABAN 5 MILLIGRAM(S): 2.5 TABLET, FILM COATED ORAL at 18:17

## 2023-08-22 RX ADMIN — SODIUM CHLORIDE 4 MILLILITER(S): 9 INJECTION INTRAMUSCULAR; INTRAVENOUS; SUBCUTANEOUS at 06:35

## 2023-08-22 RX ADMIN — INSULIN GLARGINE 28 UNIT(S): 100 INJECTION, SOLUTION SUBCUTANEOUS at 21:58

## 2023-08-22 RX ADMIN — SODIUM CHLORIDE 4 MILLILITER(S): 9 INJECTION INTRAMUSCULAR; INTRAVENOUS; SUBCUTANEOUS at 18:17

## 2023-08-22 RX ADMIN — Medication 24 MILLIGRAM(S): at 18:17

## 2023-08-22 RX ADMIN — Medication 2: at 18:18

## 2023-08-22 RX ADMIN — LINEZOLID 600 MILLIGRAM(S): 600 INJECTION, SOLUTION INTRAVENOUS at 18:17

## 2023-08-22 RX ADMIN — MEXILETINE HYDROCHLORIDE 200 MILLIGRAM(S): 150 CAPSULE ORAL at 06:35

## 2023-08-22 RX ADMIN — Medication 8: at 14:53

## 2023-08-22 RX ADMIN — Medication 2: at 21:38

## 2023-08-22 NOTE — PROGRESS NOTE ADULT - ASSESSMENT
71 yo PMHx  asthma on chronic steroids, bronchiectasis s/p surgery, allergic rhinitis,  recurrent PNA, PND, tracheomalacia s/p tracheoplasty, ESBL proteus infections (sputum),  diabetes, paroxysmal A-fib on Eliquis, colorectal cancer many years ago status post colostomy presenting with concern for shortness of breath. Prior issues with ESBL/Proteus/yeast in sputum culture and was treated with ertapenem/Benadryl/vancomycin/aztreonam and methylprednisolone.   She was discharged home with a midline and completed a course of antibiotics ertapenem (last dose 06/25) PT now readmitted with severe dyspnea and reports that having had asthma for 61 years she feels like she needs an antibiotic. Reports not tolerating meropenem. Patient follows with Dr Allison who knows this complicated pt very well  Noted  pt has PCN, cefepime allergies    RECOMMENDATIONS  -no evidence of airspace disease so suspect likely Bronchiectasis exacerbation  -recent sputums with ESBL proteus so having tolerated ertapenem and was restarted   -Repeat CXR with no pneumonia   -Fungitell negative     -8/15 Sputum culture with normal resp val   -8/14 Sputum AFB smear negative -f/u culture to rule out DIEUDONNE  -8/17 CF sputum culture with mod Staph aureus-MRSA, Pantoea (previously Enterobacter) and normal resp val   - sensitivities noted; recently completed course of ertapenem 8/12-8/18, now on linezolid     -leukocytosis trending down - suspect may be reactive in setting of steroids   - no new complaints or focal findings on exam; remains afebrile    Pulmonary following - changed to po steroid 8/19  Follow AFB, fungal sputum cultures   Continue linezolid day 5 of 7  S/p ertapenem (8/12-8/18)  Supportive care, chest PT, neb treatments   Monitor temps/CBC      Tello Fernandez M.D.  [facility] Division of Infectious Diseases  [phone]  After 5pm on weekdays and all day on weekends - please call [phone]

## 2023-08-22 NOTE — PROGRESS NOTE ADULT - SUBJECTIVE AND OBJECTIVE BOX
CHIEF COMPLAINT: SOB    Interval Events: Continues to feel better. Still complaining of "rattling in lungs and upper airway", interested in increasing airway clearance    REVIEW OF SYSTEMS:  Constitutional: [ ] negative [ ] fevers [ ] chills [ ] weight loss [ ] weight gain  HEENT: [ ] negative [ ] dry eyes [ ] eye irritation [ ] postnasal drip [ ] nasal congestion  CV: [ ] negative  [ ] chest pain [ ] orthopnea [ ] palpitations [ ] murmur  Resp: [ ] negative [X] cough [ ] shortness of breath [ ] dyspnea [ ] wheezing [X] sputum [ ] hemoptysis  GI: [ ] negative [ ] nausea [ ] vomiting [ ] diarrhea [ ] constipation [ ] abd pain [ ] dysphagia   : [ ] negative [ ] dysuria [ ] nocturia [ ] hematuria [ ] increased urinary frequency  Musculoskeletal: [ ] negative [ ] back pain [ ] myalgias [ ] arthralgias [ ] fracture  Skin: [ ] negative [ ] rash [ ] itch  Neurological: [ ] negative [ ] headache [ ] dizziness [ ] syncope [ ] weakness [ ] numbness  Psychiatric: [ ] negative [ ] anxiety [ ] depression  Endocrine: [ ] negative [ ] diabetes [ ] thyroid problem  Hematologic/Lymphatic: [ ] negative [ ] anemia [ ] bleeding problem  Allergic/Immunologic: [ ] negative [ ] itchy eyes [ ] nasal discharge [ ] hives [ ] angioedema  [X] All other systems negative  [ ] Unable to assess ROS because ________    OBJECTIVE:  ICU Vital Signs Last 24 Hrs  T(C): 36.6 (22 Aug 2023 05:59), Max: 36.9 (21 Aug 2023 14:52)  T(F): 97.8 (22 Aug 2023 05:59), Max: 98.4 (21 Aug 2023 14:52)  HR: 75 (22 Aug 2023 05:59) (75 - 86)  BP: 107/66 (22 Aug 2023 05:59) (107/66 - 116/68)  BP(mean): --  ABP: --  ABP(mean): --  RR: 18 (22 Aug 2023 05:59) (18 - 18)  SpO2: 100% (22 Aug 2023 05:59) (100% - 100%)    O2 Parameters below as of 22 Aug 2023 05:59  Patient On (Oxygen Delivery Method): room air              CAPILLARY BLOOD GLUCOSE      POCT Blood Glucose.: 137 mg/dL (22 Aug 2023 08:43)      PHYSICAL EXAM:  General: NAD, resting comforrably  HEENT: EOMI, PERRLA  Neck: Supple  Respiratory: Scattered crackles  Cardiovascular: S1S2, RRR  Abdomen: Soft, NTND, BS+  Extremities: No edema  Skin: Warm and dry  Neurological: No gross focal deficits      HOSPITAL MEDICATIONS:  MEDICATIONS  (STANDING):  albuterol/ipratropium for Nebulization 3 milliLiter(s) Nebulizer every 6 hours  apixaban 5 milliGRAM(s) Oral every 12 hours  atorvastatin 20 milliGRAM(s) Oral at bedtime  buDESOnide    Inhalation Suspension 0.5 milliGRAM(s) Inhalation two times a day  chlorhexidine 4% Liquid 1 Application(s) Topical daily  dextrose 5%. 1000 milliLiter(s) (50 mL/Hr) IV Continuous <Continuous>  dextrose 5%. 1000 milliLiter(s) (100 mL/Hr) IV Continuous <Continuous>  dextrose 50% Injectable 25 Gram(s) IV Push once  dextrose 50% Injectable 12.5 Gram(s) IV Push once  dextrose 50% Injectable 25 Gram(s) IV Push once  glucagon  Injectable 1 milliGRAM(s) IntraMuscular once  insulin glargine Injectable (LANTUS) 32 Unit(s) SubCutaneous at bedtime  insulin lispro (ADMELOG) corrective regimen sliding scale   SubCutaneous three times a day before meals  insulin lispro (ADMELOG) corrective regimen sliding scale   SubCutaneous at bedtime  insulin lispro Injectable (ADMELOG) 14 Unit(s) SubCutaneous three times a day with meals  linezolid    Tablet 600 milliGRAM(s) Oral every 12 hours  mexiletine 200 milliGRAM(s) Oral every 8 hours  nystatin    Suspension 341751 Unit(s) Swish and Swallow two times a day  pantoprazole    Tablet 40 milliGRAM(s) Oral before breakfast  polyethylene glycol 3350 17 Gram(s) Oral two times a day  predniSONE   Tablet 30 milliGRAM(s) Oral daily  senna 2 Tablet(s) Oral at bedtime  sodium chloride 7% Inhalation 4 milliLiter(s) Inhalation every 6 hours  tiotropium 2.5 MICROgram(s) Inhaler 2 Puff(s) Inhalation daily  trimethoprim  160 mG/sulfamethoxazole 800 mG 1 Tablet(s) Oral daily    MEDICATIONS  (PRN):  acetaminophen     Tablet .. 650 milliGRAM(s) Oral every 6 hours PRN Temp greater or equal to 38C (100.4F), Mild Pain (1 - 3)  aluminum hydroxide/magnesium hydroxide/simethicone Suspension 30 milliLiter(s) Oral every 4 hours PRN Dyspepsia  dextrose Oral Gel 15 Gram(s) Oral once PRN Blood Glucose LESS THAN 70 milliGRAM(s)/deciliter  diphenhydrAMINE 25 milliGRAM(s) Oral daily PRN Allergy symptoms  melatonin 3 milliGRAM(s) Oral at bedtime PRN Insomnia  ondansetron Injectable 4 milliGRAM(s) IV Push every 8 hours PRN Nausea and/or Vomiting      LABS:                        7.3    11.69 )-----------( 207      ( 22 Aug 2023 06:39 )             25.4     Hgb Trend: 7.3<--, 8.0<--, 7.5<--, 8.5<--, 8.8<--  08-22    142  |  107  |  23  ----------------------------<  135<H>  4.1   |  26  |  0.76    Ca    8.7      22 Aug 2023 06:38  Phos  3.7     08-22  Mg     2.0     08-22    TPro  4.9<L>  /  Alb  3.1<L>  /  TBili  0.7  /  DBili  x   /  AST  57<H>  /  ALT  23  /  AlkPhos  58  08-22    Creatinine Trend: 0.76<--, 0.70<--, 0.75<--, 0.65<--, 0.79<--, 0.72<--    Urinalysis Basic - ( 22 Aug 2023 06:38 )    Color: x / Appearance: x / SG: x / pH: x  Gluc: 135 mg/dL / Ketone: x  / Bili: x / Urobili: x   Blood: x / Protein: x / Nitrite: x   Leuk Esterase: x / RBC: x / WBC x   Sq Epi: x / Non Sq Epi: x / Bacteria: x            MICROBIOLOGY:       RADIOLOGY:  [ ] Reviewed and interpreted by me    PULMONARY FUNCTION TESTS:    EKG:

## 2023-08-22 NOTE — PROGRESS NOTE ADULT - PROBLEM SELECTOR PLAN 1
Test BG ac and hs and 2am while on steroids  Decrease Lantus dose to 28 units qhs for now  C/w Admelog 14 units qac for now.  Will follow  C/w Mod correction scale qac + bedtime  Will adjust as needed. Expecting BG to improve once on lower steroid doses   Discharge:  Will be determined according to BG/insulin needs/PO intake and steroid therapy at time of discharge.  Plan to d/c on basal bolus. Doses TBD  Outpt. endo follow-up. Dr Saavedra  Outpt. optho, podiatry, micro/cr  Make sure pt has Rx for all DM supplies and insulin/ DM meds. Consider [employer] [facility] E 3 if going home

## 2023-08-22 NOTE — PROGRESS NOTE ADULT - PROBLEM SELECTOR PLAN 1
- CT chest showed increased bronchial secretions, on RA saturating well, speaking in full sentences, unclear trigger, likely due to infection, or inflammation.     - sputum culture grew MRSA, on linezolid, ID following  - hypertonic saline 7% and albuterol neb q6h standing, followed by use of airway clearance device (patient brought her own from home)  - chest PT as tolerated   - budesonide 0.5mg neb q12h standing   - now on prednisone taper - 30mg daily, decrease by 10 every 7 days  - pulm following, appreciate recs  - echo: hyperdynamic LV, likely normal RV function

## 2023-08-22 NOTE — PROGRESS NOTE ADULT - PROBLEM SELECTOR PLAN 4
On chronic steroids at home > medrol 8mg qd from admission in 2022  Per pt she has been sick this year with multiple hospitalizations requiring pulse steroids. Was on Methylprednisolone 28mg PTA.   -Will need slow titration back to Medrol 8mg qd   - Will need stress steroids if acutely ill. Has been on higher dose this year due to SOB exacerbations/hospitalizations.

## 2023-08-22 NOTE — PROGRESS NOTE ADULT - SUBJECTIVE AND OBJECTIVE BOX
[facility] DISEASES  GISSELL Uriostegui Y. Patel, S. Shah, G. Casimir  [phone]  ([phone] - weekdays after 5pm and weekends)    Name: RAYRAY RODRIGUEZ  Age/Gender: 72y Female  MRN: [id number]    Interval History:  Patient seen and examined this morning.   Still with cough but overall feels better.  No new complaints noted.  Notes reviewed  No concerning overnight events  Afebrile     Allergies: tetanus toxoid (Short breath)  cefepime (Anaphylaxis)  penicillin (Anaphylaxis)  Avelox (Short breath; Pruritus)  Dilaudid (Short breath)  codeine (Short breath)  aspirin (Short breath)  iodine (Short breath; Swelling)  Valium (Short breath)  shellfish (Anaphylaxis)      Objective:  Vitals:   T(F): 97.8 (08-22-23 @ 05:59), Max: 98.4 (08-21-23 @ 14:52)  HR: 75 (08-22-23 @ 05:59) (75 - 86)  BP: 107/66 (08-22-23 @ 05:59) (107/66 - 116/68)  RR: 18 (08-22-23 @ 05:59) (18 - 18)  SpO2: 100% (08-22-23 @ 05:59) (100% - 100%)  Physical Examination:  General: no acute distress  HEENT: NC/AT, anicteric, neck supple  Respiratory: no acc muscle use, breathing comfortably  Cardiovascular: S1 and S2 present  Gastrointestinal: normal appearing, nondistended  Extremities: no edema, no cyanosis  Skin: no visible rash    Laboratory Studies:  CBC:                       7.3    11.69 )-----------( 207      ( 22 Aug 2023 06:39 )             25.4     WBC Trend:  11.69 08-22-23 @ 06:39  12.71 08-21-23 @ 16:38  12.04 08-21-23 @ 07:26  13.76 08-20-23 @ 06:30  14.13 08-19-23 @ 15:25  15.59 08-17-23 @ 06:32  13.87 08-16-23 @ 06:30    CMP: 08-22    142  |  107  |  23  ----------------------------<  135<H>  4.1   |  26  |  0.76    Ca    8.7      22 Aug 2023 06:38  Phos  3.7     08-22  Mg     2.0     08-22    TPro  4.9<L>  /  Alb  3.1<L>  /  TBili  0.7  /  DBili  x   /  AST  57<H>  /  ALT  23  /  AlkPhos  58  08-22    Creatinine: 0.76 mg/dL (08-22-23 @ 06:38)  Creatinine: 0.70 mg/dL (08-21-23 @ 07:29)  Creatinine: 0.75 mg/dL (08-20-23 @ 06:30)  Creatinine: 0.65 mg/dL (08-19-23 @ 15:25)  Creatinine: 0.79 mg/dL (08-18-23 @ 10:39)  Creatinine: 0.72 mg/dL (08-17-23 @ 06:32)  Creatinine: 0.73 mg/dL (08-16-23 @ 06:31)    LIVER FUNCTIONS - ( 22 Aug 2023 06:38 )  Alb: 3.1 g/dL / Pro: 4.9 g/dL / ALK PHOS: 58 U/L / ALT: 23 U/L / AST: 57 U/L / GGT: x           Microbiology: reviewed   Culture - Sputum, Cystic Fibrosis (collected 08-18-23 @ 23:27)  Source: .Sputum Sputum  Gram Stain (08-19-23 @ 03:39):    No polymorphonuclear leukocytes per low power field    No Squamous epithelial cells per low power field    No organisms seen per oil power field  Preliminary Report (08-21-23 @ 18:47):    Rare Methicillin Resistant Staphylococcus aureus    Rare Normal Respiratory Jessica present  Organism: Methicillin resistant Staphylococcus aureus (08-21-23 @ 18:47)  Organism: Methicillin resistant Staphylococcus aureus (08-21-23 @ 18:47)      Method Type: GARRETT      -  Ampicillin/Sulbactam: R <=8/4      -  Cefazolin: R >16      -  Clindamycin: S <=0.25      -  Erythromycin: R >4      -  Gentamicin: S <=1 Should not be used as monotherapy      -  Linezolid: S 2      -  Oxacillin: R >2      -  Penicillin: R >8      -  Rifampin: S <=1 Should not be used as monotherapy      -  Tetracycline: S <=1      -  Trimethoprim/Sulfamethoxazole: R >2/38      -  Vancomycin: S 2    Culture - Sputum (collected 08-18-23 @ 10:43)  Source: .Sputum Sputum  Gram Stain (08-18-23 @ 20:40):    No polymorphonuclear leukocytes per low power field    Rare Squamous epithelial cells per low power field    Moderate Gram positive cocci in pairs per oil power field    Few Gram Negative Rods per oil power field  Final Report (08-20-23 @ 19:06):    Normal Respiratory Jessica present    Culture - Blood (collected 08-17-23 @ 09:35)  Source: .Blood Blood-Peripheral  Preliminary Report (08-21-23 @ 18:01):    No growth at 4 days    Culture - Blood (collected 08-17-23 @ 09:30)  Source: .Blood Blood-Peripheral  Preliminary Report (08-21-23 @ 18:01):    No growth at 4 days    Culture - Sputum, Cystic Fibrosis (collected 08-17-23 @ 01:18)  Source: .Sputum Sputum  Gram Stain (08-17-23 @ 10:15):    Rare Squamous epithelial cells per low power field    No polymorphonuclear leukocytes per low power field    Rare Gram positive cocci in pairs per oil power field  Final Report (08-21-23 @ 09:41):    Moderate Methicillin Resistant Staphylococcus aureus    Rare Pantoea agglomerans    Numerous Normal Respiratory Jessica present  Organism: Methicillin resistant Staphylococcus aureus  Pantoea agglomerans (Previousley Enterobacter) (08-21-23 @ 09:41)  Organism: Pantoea agglomerans (Previousley Enterobacter) (08-21-23 @ 09:41)      Method Type: GARRETT      -  Amikacin: S <=16      -  Amoxicillin/Clavulanic Acid: S <=8/4      -  Ampicillin: S <=8 These ampicillin results predict results for amoxicillin      -  Ampicillin/Sulbactam: S <=4/2 Enterobacter, Klebsiella aerogenes, Citrobacter, and Serratia may develop resistance during prolonged therapy (3-4 days)      -  Aztreonam: S <=4      -  Cefazolin: S <=2 Enterobacter, Klebsiella aerogenes, Citrobacter, and Serratia may develop resistance during prolonged therapy (3-4 days)      -  Cefepime: S <=2      -  Cefoxitin: S <=8      -  Ceftriaxone: S <=1 Enterobacter, Klebsiella aerogenes, Citrobacter, and Serratia may develop resistance during prolonged therapy      -  Ciprofloxacin: S <=0.25      -  Ertapenem: S <=0.5      -  Gentamicin: S <=2      -  Levofloxacin: S <=0.5      -  Meropenem: S <=1      -  Piperacillin/Tazobactam: S <=8      -  Tobramycin: S <=2      -  Trimethoprim/Sulfamethoxazole: S <=0.5/9.5  Organism: Methicillin resistant Staphylococcus aureus (08-21-23 @ 09:41)      Method Type: GARRETT      -  Ampicillin/Sulbactam: R <=8/4      -  Cefazolin: R >16      -  Clindamycin: S <=0.25      -  Erythromycin: R >4      -  Gentamicin: S <=1 Should not be used as monotherapy      -  Linezolid: S 2      -  Oxacillin: R >2      -  Penicillin: R >8      -  Rifampin: S <=1 Should not be used as monotherapy      -  Tetracycline: S 2      -  Trimethoprim/Sulfamethoxazole: R >2/38      -  Vancomycin: S 2    Culture - Sputum (collected 08-15-23 @ 12:49)  Source: .Sputum Sputum  Gram Stain (08-15-23 @ 21:07):    Rare Squamous epithelial cells per low power field    Rare polymorphonuclear leukocytes per low power field    Rare Yeast like cells per oil power field  Final Report (08-17-23 @ 18:08):    Normal Respiratory Jessica present    Culture - Acid Fast - Sputum w/Smear (collected 08-14-23 @ 13:11)  Source: .Sputum Sputum  Preliminary Report (08-19-23 @ 15:07):    Culture is being performed.    Culture - Blood (collected 08-11-23 @ 16:00)  Source: .Blood Blood-Peripheral  Final Report (08-16-23 @ 20:00):    No growth at 5 days    Culture - Blood (collected 08-11-23 @ 15:15)  Source: .Blood Blood-Peripheral  Final Report (08-16-23 @ 20:00):    No growth at 5 days        SARS-CoV-2 Result: NotDetec (11 Aug 2023 16:06)    Radiology: reviewed     Medications:  acetaminophen     Tablet .. 650 milliGRAM(s) Oral every 6 hours PRN  albuterol/ipratropium for Nebulization 3 milliLiter(s) Nebulizer every 6 hours  aluminum hydroxide/magnesium hydroxide/simethicone Suspension 30 milliLiter(s) Oral every 4 hours PRN  apixaban 5 milliGRAM(s) Oral every 12 hours  atorvastatin 20 milliGRAM(s) Oral at bedtime  buDESOnide    Inhalation Suspension 0.5 milliGRAM(s) Inhalation two times a day  chlorhexidine 4% Liquid 1 Application(s) Topical daily  dextrose 5%. 1000 milliLiter(s) IV Continuous <Continuous>  dextrose 5%. 1000 milliLiter(s) IV Continuous <Continuous>  dextrose 50% Injectable 12.5 Gram(s) IV Push once  dextrose 50% Injectable 25 Gram(s) IV Push once  dextrose 50% Injectable 25 Gram(s) IV Push once  dextrose Oral Gel 15 Gram(s) Oral once PRN  diphenhydrAMINE 25 milliGRAM(s) Oral daily PRN  glucagon  Injectable 1 milliGRAM(s) IntraMuscular once  insulin glargine Injectable (LANTUS) 32 Unit(s) SubCutaneous at bedtime  insulin lispro (ADMELOG) corrective regimen sliding scale   SubCutaneous three times a day before meals  insulin lispro (ADMELOG) corrective regimen sliding scale   SubCutaneous at bedtime  insulin lispro Injectable (ADMELOG) 14 Unit(s) SubCutaneous three times a day with meals  linezolid    Tablet 600 milliGRAM(s) Oral every 12 hours  melatonin 3 milliGRAM(s) Oral at bedtime PRN  mexiletine 200 milliGRAM(s) Oral every 8 hours  nystatin    Suspension 602136 Unit(s) Swish and Swallow two times a day  ondansetron Injectable 4 milliGRAM(s) IV Push every 8 hours PRN  pantoprazole    Tablet 40 milliGRAM(s) Oral before breakfast  polyethylene glycol 3350 17 Gram(s) Oral two times a day  predniSONE   Tablet 30 milliGRAM(s) Oral daily  senna 2 Tablet(s) Oral at bedtime  sodium chloride 7% Inhalation 4 milliLiter(s) Inhalation every 6 hours  tiotropium 2.5 MICROgram(s) Inhaler 2 Puff(s) Inhalation daily  trimethoprim  160 mG/sulfamethoxazole 800 mG 1 Tablet(s) Oral daily    Current Antimicrobials:  linezolid    Tablet 600 milliGRAM(s) Oral every 12 hours  nystatin    Suspension 783507 Unit(s) Swish and Swallow two times a day  trimethoprim  160 mG/sulfamethoxazole 800 mG 1 Tablet(s) Oral daily    Prior/Completed Antimicrobials:  ertapenem  IVPB  ertapenem  IVPB

## 2023-08-22 NOTE — PROGRESS NOTE ADULT - SUBJECTIVE AND OBJECTIVE BOX
DIABETES FOLLOW UP NOTE: Saw pt earlier today    Chief Complaint: Endocrine consult requested for management of T2DM    INTERVAL HX: Pt stable, reports feeling and eating well. BG levels variable between 100s to 400s in the last 24 hours. Noted BGs are very different from day to day even though pt was on Prednisone 40 mg for the past 4 days. BG ac lunch in 90s yesterday and 400s today only getting 2 units of premeal insulin less today when compared from yesterday. Noted steroid taper ordered today with Prednisone dose going down to 30mg tomorrow.     Review of Systems:  General: As above  Cardiovascular: No chest pain, palpitations  Respiratory: No SOB, no cough  GI: No nausea, vomiting, abdominal pain  Endocrine: No polyuria, polydipsia or S&Sx of hypoglycemia    Allergies    tetanus toxoid (Short breath)  cefepime (Anaphylaxis)  penicillin (Anaphylaxis)  Avelox (Short breath; Pruritus)  Dilaudid (Short breath)  codeine (Short breath)  aspirin (Short breath)  iodine (Short breath; Swelling)  Valium (Short breath)  shellfish (Anaphylaxis)    Intolerances      MEDICATIONS:  atorvastatin 20 milliGRAM(s) Oral at bedtime  insulin glargine Injectable (LANTUS) 32 Unit(s) SubCutaneous at bedtime  insulin lispro (ADMELOG) corrective regimen sliding scale   SubCutaneous three times a day before meals  insulin lispro (ADMELOG) corrective regimen sliding scale   SubCutaneous at bedtime  insulin lispro Injectable (ADMELOG) 14 Unit(s) SubCutaneous three times a day with meals  methylPREDNISolone 24 milliGRAM(s) Oral daily  trimethoprim  160 mG/sulfamethoxazole 800 mG 1 Tablet(s) Oral daily      PHYSICAL EXAM:  VITALS: T(C): 36.6 (08-22-23 @ 14:03)  T(F): 97.8 (08-22-23 @ 14:03), Max: 98.4 (08-21-23 @ 20:46)  HR: 90 (08-22-23 @ 15:47) (75 - 102)  BP: 119/74 (08-22-23 @ 15:47) (107/66 - 151/73)  RR:  (18 - 18)  SpO2:  (99% - 100%)  Wt(kg): --  GENERAL: Female laying in bed in NAD  Abdomen: Soft, nontender, non distended  Extremities: Warm, no edema in all 4 exts  NEURO: Alert and able to answer questions    LABS:  POCT Blood Glucose.: 167 mg/dL (08-22-23 @ 17:47)  POCT Blood Glucose.: 342 mg/dL (08-22-23 @ 14:40)  POCT Blood Glucose.: 421 mg/dL (08-22-23 @ 12:57)  POCT Blood Glucose.: 137 mg/dL (08-22-23 @ 08:43)  POCT Blood Glucose.: 137 mg/dL (08-22-23 @ 02:00)  POCT Blood Glucose.: 205 mg/dL (08-21-23 @ 21:58)  POCT Blood Glucose.: 239 mg/dL (08-21-23 @ 17:31)  POCT Blood Glucose.: 91 mg/dL (08-21-23 @ 13:00)  POCT Blood Glucose.: 139 mg/dL (08-21-23 @ 08:49)  POCT Blood Glucose.: 88 mg/dL (08-21-23 @ 01:52)  POCT Blood Glucose.: 146 mg/dL (08-20-23 @ 21:31)  POCT Blood Glucose.: 388 mg/dL (08-20-23 @ 17:19)  POCT Blood Glucose.: 261 mg/dL (08-20-23 @ 12:42)  POCT Blood Glucose.: 79 mg/dL (08-20-23 @ 08:57)  POCT Blood Glucose.: 190 mg/dL (08-19-23 @ 21:32)                            7.3    11.69 )-----------( 207      ( 22 Aug 2023 06:39 )             25.4       08-22    142  |  107  |  23  ----------------------------<  135<H>  4.1   |  26  |  0.76    eGFR: 82    Ca    8.7      08-22  Mg     2.0     08-22  Phos  3.7     08-22    TPro  4.9<L>  /  Alb  3.1<L>  /  TBili  0.7  /  DBili  x   /  AST  57<H>  /  ALT  23  /  AlkPhos  58  08-22    A1C with Estimated Average Glucose Result: 9.1 % (06-17-23 @ 10:27)      Estimated Average Glucose: 214 mg/dL (06-17-23 @ 10:27)

## 2023-08-22 NOTE — PROGRESS NOTE ADULT - ATTENDING COMMENTS
This is a 72F with h/o bronchiectasis on chronic steroid, s/p surgery, allergic rhinitis,  recurrent PNA, PND, tracheomalacia s/p tracheoplasty, ESBL proteus infections (sputum), T2DM, paroxysmal A-fib on Eliquis, colorectal cancer many years ago status post colostomy presenting with shortness of breath for 3 days. Prior issues with ESBL/Proteus/yeast in sputum culture and was treated with ertapenem/Benadryl/vancomycin/aztreonam and methylprednisolone. She was discharged home with a midline and completed a course of antibiotics ertapenem (last dose 06/25). Recently she was treated for MRSA and pseudomonas in sputum and completed Tobra nebs and doxycycline.    1. Acute on chronic hypoxic RF due to exacerbation of bronchiectasis (on chronic steroid)  2. Recent tracheo-bronchitis due to ESBL proteus/MRSA/pseudomonas  3. Paroxysmal A-fib on Eliquis  4. H/o colorectal cancer, status post colostomy    - Walked with her- weak, tired, had to stop to catch breath, O2 97% on ambulation off O2. afebrile, less SOB, WBC 12   - repeat sputum c/s grew MRSA, now on IV Zyvox for 7 days  - Pulmonary plans noted- on bronchodilators, PO prednisone taper, inhaled steroid, s/p IV Ertapenem and Hypertonic saline to 7% for airway clearance device (patient brought her own from home) and chest PT as tolerated   - CT chest shows unchanged infectious/inflammatory small airways disease in the right middle/lower lobes with multifocal small tree-in-bud nodules. Status post ascending aortic and aortic valve replacement, with residual dissection flap better seen on the prior study. Stable indeterminate small left renal nodule and numerous pancreatic cysts.  - LDH 1382, Fungitell neg, already on Bactrim ppx  - Echo bubble study no shunt, repeat echo ordered to see transvalvular gradient post TAVR  - c/w Eliquis,   - Endocrine f/u plans noted- on basal/bolus insulins FS elevated due to steroid  - Her outpatient Pul- Dr Allison is aware. This is a 72F with h/o bronchiectasis on chronic steroid, s/p surgery, allergic rhinitis,  recurrent PNA, PND, tracheomalacia s/p tracheoplasty, ESBL proteus infections (sputum), T2DM, paroxysmal A-fib on Eliquis, colorectal cancer many years ago status post colostomy presenting with shortness of breath for 3 days. Prior issues with ESBL/Proteus/yeast in sputum culture and was treated with ertapenem/Benadryl/vancomycin/aztreonam and methylprednisolone. She was discharged home with a midline and completed a course of antibiotics ertapenem (last dose 06/25). Recently she was treated for MRSA and pseudomonas in sputum and completed Tobra nebs and doxycycline.    1. Acute on chronic hypoxic RF due to exacerbation of bronchiectasis (on chronic steroid)  2. Recent tracheo-bronchitis due to ESBL proteus/MRSA/pseudomonas  3. Paroxysmal A-fib on Eliquis  4. H/o colorectal cancer, status post colostomy    - Walked with her- weak, tired, had to stop to catch breath, O2 97% on ambulation off O2. afebrile, less SOB, WBC 12   - repeat sputum c/s grew MRSA, now on IV Zyvox for 7 days  - Pulmonary plans noted- on bronchodilators, PO prednisone taper, inhaled steroid, s/p IV Ertapenem and Hypertonic saline to 7% for airway clearance device (patient brought her own from home) and chest PT as tolerated   - CT chest shows unchanged infectious/inflammatory small airways disease in the right middle/lower lobes with multifocal small tree-in-bud nodules. Status post ascending aortic and aortic valve replacement, with residual dissection flap better seen on the prior study. Stable indeterminate small left renal nodule and numerous pancreatic cysts.  - LDH 1382, Fungitell neg, already on Bactrim ppx  - Echo bubble study no shunt, repeat echo ordered to see transvalvular gradient post TAVR  - c/w Eliquis,   - Endocrine f/u plans noted- on basal/bolus insulins FS elevated due to steroid  - Her outpatient Pul- Dr Allison is aware.  ** spoke to her Dtr- Zoe, jessica MADISON at [facility] or [place], JÚNIOR is aware

## 2023-08-22 NOTE — PROGRESS NOTE ADULT - ASSESSMENT
72 F w/h/o uncontrolled T2DM (A1C 9.4%) on basal/bolus insulin PTA. Unknown DM complications. Also COPD, secondary adrenal Insufficiency on chronic steroids, colorectal cancer s/p resection (colostomy bag), Chronic A fib on Eliquis, and tracheomalacia and multiple intubations, type A aortic dissection s/p aortic dissection repair on 9/07/22, sepsis. Pt well known to this provider from prior admissions. Here with SOB> treated for PNA and on steroid pulse coming down to Prednisolone to 24mg tomorrow. Endocrine consulted for assistance with uncontrolled DM/steroids for AI on steroid pulse for SOB with BG without a pattern between  100s to 400s in the last 24 hours. Since Methylprednisolone is coming down will preventively decrease basal insulin doses and re eavluate tomorrow for further adjustments to BG goal 100s to 180s. Pt reports she wears Freestyle Luis E 14 at home but has been in and out of hospital since may. Will start Freestyle Luis E 3 if pt going home. Needs keshia on phone but pt states she wants daughter to do it.

## 2023-08-22 NOTE — PROGRESS NOTE ADULT - SUBJECTIVE AND OBJECTIVE BOX
Lázaro Molina, PGY-1  Please contact on Teams    RAYRAY RODRIGUEZ  72y  Female    Reason for Admission:       SUBJECTIVE/INTERVAL EVENTS: No acute events. Pt seen and examined at bedside.    PHYSICAL EXAM:  GENERAL: NAD, lying comfortably in bed   HEAD:  Atraumatic, Normocephalic  EYES: EOMI b/l, PERRLA b/l, conjunctiva and sclera clear  NECK: Supple, No JVD, No LAD   CHEST/LUNG: coarse breath sounds, similar to prior  HEART: Regular rate and rhythm; S1 and S2 present  ABDOMEN: Soft, Nontender, Nondistended; Bowel sounds present  EXTREMITIES:  No clubbing, cyanosis, or edema  NEURO: AAOx3, non-focal   SKIN: No rashes or     Vital Signs Last 24 Hrs  T(C): 36.6 (22 Aug 2023 05:59), Max: 36.9 (21 Aug 2023 14:52)  T(F): 97.8 (22 Aug 2023 05:59), Max: 98.4 (21 Aug 2023 14:52)  HR: 75 (22 Aug 2023 05:59) (75 - 86)  BP: 107/66 (22 Aug 2023 05:59) (107/66 - 116/68)  BP(mean): --  RR: 18 (22 Aug 2023 05:59) (18 - 18)  SpO2: 100% (22 Aug 2023 05:59) (100% - 100%)    Parameters below as of 22 Aug 2023 05:59  Patient On (Oxygen Delivery Method): room air        Consultant(s) Notes Reviewed:  [x] YES  [ ] NO  Care Discussed with Consultants/Other Providers [x] YES  [ ] NO    LABS:                        7.3    11.69 )-----------( 207      ( 22 Aug 2023 06:39 )             25.4                         8.0    12.71 )-----------( 224      ( 21 Aug 2023 16:38 )             27.9                         7.5    12.04 )-----------( 212      ( 21 Aug 2023 07:26 )             26.0                               142    |  107    |  23                  Calcium: 8.7   / iCa: x      (08-22 @ 06:38)    ----------------------------<  135       Magnesium: 2.0                              4.1     |  26     |  0.76             Phosphorous: 3.7      TPro  4.9    /  Alb  3.1    /  TBili  0.7    /  DBili  x      /  AST  57     /  ALT  23     /  AlkPhos  58     22 Aug 2023 06:38    Urinalysis Basic - ( 22 Aug 2023 06:38 )    Color: x / Appearance: x / SG: x / pH: x  Gluc: 135 mg/dL / Ketone: x  / Bili: x / Urobili: x   Blood: x / Protein: x / Nitrite: x   Leuk Esterase: x / RBC: x / WBC x   Sq Epi: x / Non Sq Epi: x / Bacteria: x        RADIOLOGY & ADDITIONAL TESTS:    Imaging Personally Reviewed:  [x] YES  [ ] NO    MEDICATIONS  (STANDING):  albuterol/ipratropium for Nebulization 3 milliLiter(s) Nebulizer every 6 hours  apixaban 5 milliGRAM(s) Oral every 12 hours  atorvastatin 20 milliGRAM(s) Oral at bedtime  buDESOnide    Inhalation Suspension 0.5 milliGRAM(s) Inhalation two times a day  chlorhexidine 4% Liquid 1 Application(s) Topical daily  dextrose 5%. 1000 milliLiter(s) (50 mL/Hr) IV Continuous <Continuous>  dextrose 5%. 1000 milliLiter(s) (100 mL/Hr) IV Continuous <Continuous>  dextrose 50% Injectable 12.5 Gram(s) IV Push once  dextrose 50% Injectable 25 Gram(s) IV Push once  dextrose 50% Injectable 25 Gram(s) IV Push once  glucagon  Injectable 1 milliGRAM(s) IntraMuscular once  insulin glargine Injectable (LANTUS) 32 Unit(s) SubCutaneous at bedtime  insulin lispro (ADMELOG) corrective regimen sliding scale   SubCutaneous three times a day before meals  insulin lispro (ADMELOG) corrective regimen sliding scale   SubCutaneous at bedtime  insulin lispro Injectable (ADMELOG) 14 Unit(s) SubCutaneous three times a day with meals  linezolid    Tablet 600 milliGRAM(s) Oral every 12 hours  mexiletine 200 milliGRAM(s) Oral every 8 hours  nystatin    Suspension 201376 Unit(s) Swish and Swallow two times a day  pantoprazole    Tablet 40 milliGRAM(s) Oral before breakfast  polyethylene glycol 3350 17 Gram(s) Oral two times a day  predniSONE   Tablet 30 milliGRAM(s) Oral daily  senna 2 Tablet(s) Oral at bedtime  sodium chloride 7% Inhalation 4 milliLiter(s) Inhalation every 6 hours  tiotropium 2.5 MICROgram(s) Inhaler 2 Puff(s) Inhalation daily  trimethoprim  160 mG/sulfamethoxazole 800 mG 1 Tablet(s) Oral daily    MEDICATIONS  (PRN):  acetaminophen     Tablet .. 650 milliGRAM(s) Oral every 6 hours PRN Temp greater or equal to 38C (100.4F), Mild Pain (1 - 3)  aluminum hydroxide/magnesium hydroxide/simethicone Suspension 30 milliLiter(s) Oral every 4 hours PRN Dyspepsia  dextrose Oral Gel 15 Gram(s) Oral once PRN Blood Glucose LESS THAN 70 milliGRAM(s)/deciliter  diphenhydrAMINE 25 milliGRAM(s) Oral daily PRN Allergy symptoms  melatonin 3 milliGRAM(s) Oral at bedtime PRN Insomnia  ondansetron Injectable 4 milliGRAM(s) IV Push every 8 hours PRN Nausea and/or Vomiting      HEALTH ISSUES - PROBLEM Dx:  Acute exacerbation of bronchiectasis    Acute asthma exacerbation    SIRS without infection or organ dysfunction    Atrial fibrillation  paroxysmal, on eliquis    Diabetes mellitus    Prophylactic measure    Uncontrolled type 2 diabetes mellitus with hyperglycemia    Essential hypertension    Hyperlipidemia             SBIRT referral

## 2023-08-22 NOTE — PROVIDER CONTACT NOTE (CRITICAL VALUE NOTIFICATION) - SITUATION
Sputum culture positive for MRSA
Sputum Culture cystic fibrosis positive moderate methicillin resistant staphylococcus aureus rare panceotoea agglomerans numerous normal respiratory val present.
sputum culture shows  moderate MRSA, rare pantoea agglomerans, normal respiratory val
8/17- sputum culture showed moderate MRSA, rare pantoea agglomeras,  numerous normal respiratory val present.

## 2023-08-23 LAB
ALBUMIN SERPL ELPH-MCNC: 3.4 G/DL — SIGNIFICANT CHANGE UP (ref 3.3–5)
ALP SERPL-CCNC: 60 U/L — SIGNIFICANT CHANGE UP (ref 40–120)
ALT FLD-CCNC: 21 U/L — SIGNIFICANT CHANGE UP (ref 10–45)
ANION GAP SERPL CALC-SCNC: 12 MMOL/L — SIGNIFICANT CHANGE UP (ref 5–17)
AST SERPL-CCNC: 55 U/L — HIGH (ref 10–40)
BASOPHILS # BLD AUTO: 0.02 K/UL — SIGNIFICANT CHANGE UP (ref 0–0.2)
BASOPHILS NFR BLD AUTO: 0.1 % — SIGNIFICANT CHANGE UP (ref 0–2)
BILIRUB SERPL-MCNC: 0.8 MG/DL — SIGNIFICANT CHANGE UP (ref 0.2–1.2)
BUN SERPL-MCNC: 24 MG/DL — HIGH (ref 7–23)
CALCIUM SERPL-MCNC: 8.6 MG/DL — SIGNIFICANT CHANGE UP (ref 8.4–10.5)
CHLORIDE SERPL-SCNC: 106 MMOL/L — SIGNIFICANT CHANGE UP (ref 96–108)
CO2 SERPL-SCNC: 23 MMOL/L — SIGNIFICANT CHANGE UP (ref 22–31)
CREAT SERPL-MCNC: 0.71 MG/DL — SIGNIFICANT CHANGE UP (ref 0.5–1.3)
EGFR: 89 ML/MIN/1.73M2 — SIGNIFICANT CHANGE UP
EOSINOPHIL # BLD AUTO: 0 K/UL — SIGNIFICANT CHANGE UP (ref 0–0.5)
EOSINOPHIL NFR BLD AUTO: 0 % — SIGNIFICANT CHANGE UP (ref 0–6)
GLUCOSE BLDC GLUCOMTR-MCNC: 125 MG/DL — HIGH (ref 70–99)
GLUCOSE BLDC GLUCOMTR-MCNC: 158 MG/DL — HIGH (ref 70–99)
GLUCOSE BLDC GLUCOMTR-MCNC: 172 MG/DL — HIGH (ref 70–99)
GLUCOSE BLDC GLUCOMTR-MCNC: 175 MG/DL — HIGH (ref 70–99)
GLUCOSE SERPL-MCNC: 164 MG/DL — HIGH (ref 70–99)
HCT VFR BLD CALC: 24.9 % — LOW (ref 34.5–45)
HGB BLD-MCNC: 7.2 G/DL — LOW (ref 11.5–15.5)
IMM GRANULOCYTES NFR BLD AUTO: 1.3 % — HIGH (ref 0–0.9)
LYMPHOCYTES # BLD AUTO: 1.38 K/UL — SIGNIFICANT CHANGE UP (ref 1–3.3)
LYMPHOCYTES # BLD AUTO: 10.3 % — LOW (ref 13–44)
MAGNESIUM SERPL-MCNC: 2.2 MG/DL — SIGNIFICANT CHANGE UP (ref 1.6–2.6)
MCHC RBC-ENTMCNC: 22.4 PG — LOW (ref 27–34)
MCHC RBC-ENTMCNC: 28.9 GM/DL — LOW (ref 32–36)
MCV RBC AUTO: 77.6 FL — LOW (ref 80–100)
MONOCYTES # BLD AUTO: 1.13 K/UL — HIGH (ref 0–0.9)
MONOCYTES NFR BLD AUTO: 8.4 % — SIGNIFICANT CHANGE UP (ref 2–14)
NEUTROPHILS # BLD AUTO: 10.71 K/UL — HIGH (ref 1.8–7.4)
NEUTROPHILS NFR BLD AUTO: 79.9 % — HIGH (ref 43–77)
NRBC # BLD: 2 /100 WBCS — HIGH (ref 0–0)
OB PNL STL: NEGATIVE — SIGNIFICANT CHANGE UP
PHOSPHATE SERPL-MCNC: 3.5 MG/DL — SIGNIFICANT CHANGE UP (ref 2.5–4.5)
PLATELET # BLD AUTO: 226 K/UL — SIGNIFICANT CHANGE UP (ref 150–400)
POTASSIUM SERPL-MCNC: 4.6 MMOL/L — SIGNIFICANT CHANGE UP (ref 3.5–5.3)
POTASSIUM SERPL-SCNC: 4.6 MMOL/L — SIGNIFICANT CHANGE UP (ref 3.5–5.3)
PROT SERPL-MCNC: 5.4 G/DL — LOW (ref 6–8.3)
RBC # BLD: 3.21 M/UL — LOW (ref 3.8–5.2)
RBC # FLD: 31.2 % — HIGH (ref 10.3–14.5)
SODIUM SERPL-SCNC: 141 MMOL/L — SIGNIFICANT CHANGE UP (ref 135–145)
WBC # BLD: 13.41 K/UL — HIGH (ref 3.8–10.5)
WBC # FLD AUTO: 13.41 K/UL — HIGH (ref 3.8–10.5)

## 2023-08-23 PROCEDURE — 99232 SBSQ HOSP IP/OBS MODERATE 35: CPT

## 2023-08-23 PROCEDURE — 99233 SBSQ HOSP IP/OBS HIGH 50: CPT

## 2023-08-23 PROCEDURE — 74176 CT ABD & PELVIS W/O CONTRAST: CPT | Mod: 26

## 2023-08-23 RX ADMIN — Medication 1 TABLET(S): at 13:22

## 2023-08-23 RX ADMIN — INSULIN GLARGINE 28 UNIT(S): 100 INJECTION, SOLUTION SUBCUTANEOUS at 22:03

## 2023-08-23 RX ADMIN — PANTOPRAZOLE SODIUM 40 MILLIGRAM(S): 20 TABLET, DELAYED RELEASE ORAL at 06:30

## 2023-08-23 RX ADMIN — Medication 500000 UNIT(S): at 06:29

## 2023-08-23 RX ADMIN — Medication 14 UNIT(S): at 11:10

## 2023-08-23 RX ADMIN — CHLORHEXIDINE GLUCONATE 1 APPLICATION(S): 213 SOLUTION TOPICAL at 13:24

## 2023-08-23 RX ADMIN — Medication 3 MILLILITER(S): at 23:59

## 2023-08-23 RX ADMIN — LINEZOLID 600 MILLIGRAM(S): 600 INJECTION, SOLUTION INTRAVENOUS at 06:27

## 2023-08-23 RX ADMIN — POLYETHYLENE GLYCOL 3350 17 GRAM(S): 17 POWDER, FOR SOLUTION ORAL at 06:32

## 2023-08-23 RX ADMIN — Medication 3 MILLILITER(S): at 13:23

## 2023-08-23 RX ADMIN — Medication 3 MILLILITER(S): at 06:30

## 2023-08-23 RX ADMIN — ATORVASTATIN CALCIUM 20 MILLIGRAM(S): 80 TABLET, FILM COATED ORAL at 22:04

## 2023-08-23 RX ADMIN — SENNA PLUS 2 TABLET(S): 8.6 TABLET ORAL at 22:04

## 2023-08-23 RX ADMIN — TIOTROPIUM BROMIDE 2 PUFF(S): 18 CAPSULE ORAL; RESPIRATORY (INHALATION) at 13:22

## 2023-08-23 RX ADMIN — Medication 14 UNIT(S): at 18:07

## 2023-08-23 RX ADMIN — APIXABAN 5 MILLIGRAM(S): 2.5 TABLET, FILM COATED ORAL at 06:28

## 2023-08-23 RX ADMIN — MEXILETINE HYDROCHLORIDE 200 MILLIGRAM(S): 150 CAPSULE ORAL at 13:23

## 2023-08-23 RX ADMIN — Medication 500000 UNIT(S): at 18:08

## 2023-08-23 RX ADMIN — Medication 2: at 18:07

## 2023-08-23 RX ADMIN — MEXILETINE HYDROCHLORIDE 200 MILLIGRAM(S): 150 CAPSULE ORAL at 06:29

## 2023-08-23 RX ADMIN — Medication 0.5 MILLIGRAM(S): at 18:08

## 2023-08-23 RX ADMIN — SODIUM CHLORIDE 4 MILLILITER(S): 9 INJECTION INTRAMUSCULAR; INTRAVENOUS; SUBCUTANEOUS at 23:58

## 2023-08-23 RX ADMIN — APIXABAN 5 MILLIGRAM(S): 2.5 TABLET, FILM COATED ORAL at 18:09

## 2023-08-23 RX ADMIN — SODIUM CHLORIDE 4 MILLILITER(S): 9 INJECTION INTRAMUSCULAR; INTRAVENOUS; SUBCUTANEOUS at 06:28

## 2023-08-23 RX ADMIN — SODIUM CHLORIDE 4 MILLILITER(S): 9 INJECTION INTRAMUSCULAR; INTRAVENOUS; SUBCUTANEOUS at 18:08

## 2023-08-23 RX ADMIN — POLYETHYLENE GLYCOL 3350 17 GRAM(S): 17 POWDER, FOR SOLUTION ORAL at 18:08

## 2023-08-23 RX ADMIN — SODIUM CHLORIDE 4 MILLILITER(S): 9 INJECTION INTRAMUSCULAR; INTRAVENOUS; SUBCUTANEOUS at 13:23

## 2023-08-23 RX ADMIN — Medication 0.5 MILLIGRAM(S): at 06:28

## 2023-08-23 RX ADMIN — Medication 3 MILLILITER(S): at 00:00

## 2023-08-23 RX ADMIN — LINEZOLID 600 MILLIGRAM(S): 600 INJECTION, SOLUTION INTRAVENOUS at 18:08

## 2023-08-23 RX ADMIN — MEXILETINE HYDROCHLORIDE 200 MILLIGRAM(S): 150 CAPSULE ORAL at 22:04

## 2023-08-23 RX ADMIN — SODIUM CHLORIDE 4 MILLILITER(S): 9 INJECTION INTRAMUSCULAR; INTRAVENOUS; SUBCUTANEOUS at 00:00

## 2023-08-23 RX ADMIN — Medication 3 MILLILITER(S): at 18:08

## 2023-08-23 RX ADMIN — Medication 24 MILLIGRAM(S): at 06:27

## 2023-08-23 NOTE — PROGRESS NOTE ADULT - ATTENDING COMMENTS
This is a 72F with h/o bronchiectasis on chronic steroid, s/p surgery, allergic rhinitis,  recurrent PNA, PND, tracheomalacia s/p tracheoplasty, ESBL proteus infections (sputum), T2DM, paroxysmal A-fib on Eliquis, colorectal cancer many years ago status post colostomy presenting with shortness of breath for 3 days. Prior issues with ESBL/Proteus/yeast in sputum culture and was treated with ertapenem/Benadryl/vancomycin/aztreonam and methylprednisolone. She was discharged home with a midline and completed a course of antibiotics ertapenem (last dose 06/25). Recently she was treated for MRSA and pseudomonas in sputum and completed Tobra nebs and doxycycline.    1. Acute on chronic hypoxic RF due to exacerbation of bronchiectasis (on chronic steroid)  2. Recent tracheo-bronchitis due to ESBL proteus/MRSA/pseudomonas  3. Anemia, multifactorial  4. Paroxysmal A-fib on Eliquis  5. H/o colorectal cancer, status post colostomy    - Weak, tired, breathing improving- less cough, afebrile, off O2, O2 97% on ambulation off O2.  - repeat sputum c/s grew MRSA, now on PO Zyvox for 7 days  - Heme consult called as Hb is 7.2 ( im May was 12), FOBT neg, Iron studies and hemolusis, MM w/u in progress  - Pulmonary plans noted- on bronchodilators, PO prednisone taper, inhaled steroid, s/p IV Ertapenem and Hypertonic saline to 7% for airway clearance device (patient brought her own from home) and chest PT as tolerated   - CT chest shows unchanged infectious/inflammatory small airways disease in the right middle/lower lobes with multifocal small tree-in-bud nodules. Status post ascending aortic and aortic valve replacement, with residual dissection flap better seen on the prior study. Stable indeterminate small left renal nodule and numerous pancreatic cysts.  - LDH 1382, Fungitell neg, already on Bactrim ppx  - Echo bubble study no shunt, repeat echo ordered to see transvalvular gradient post TAVR  - c/w Eliquis,   - Endocrine f/u plans noted- on basal/bolus insulins FS elevated due to steroid  - Her outpatient Pul- Dr Allison is aware.  ** spoke to her Dtr- jessica Enriquez at [facility] or [facility] is aware.

## 2023-08-23 NOTE — PROGRESS NOTE ADULT - ASSESSMENT
Patient is a 73 yo F w/ asthma (chronic steroids), bronchiectasis, allergic rhinitis, recurrent PNA, PND, tracheomalacia s/p tracheoplasty, tracheobronchomalacia, pAfib (Eliquis), colorectal ca s/p colostomy, aortic dissection s/p ascending aorta repair who p/w acute on chronic shortness of breath in the setting of recent completion of abx and tapering of her steroids as an outpatient.     #SOB - Likely 2/2 bronchiectasis flare and asthma exacerbation  #Bronchiectasis flare  #Acute Asthma exacerbation  - ID recs appreciated, s/p Ertapenem, currently on 7 day course of Linezolid  - c/w albuterol/7% with airway clearance q6h  - Please add Aerobika or Acapella q6h for airway clearance  - c/w Prednisone 30mg PO daily, anticipate extended taper decreasing by 10mg per week until back at maintenance dose  - c/w Budesonide 0.5mg q12h   - c/w Bactrim for PJP ppx  - PT/OT, OOB to chair as much as possible    Pulmonary will sign off. Patient needs follow up with her pulmonologist Dr. Eulalio Allison on discharge. Please call back if any questions.     Shaan Shah MD  Pulmonary & Critical Care   Patient is a 71 yo F w/ asthma (chronic steroids), bronchiectasis, allergic rhinitis, recurrent PNA, PND, tracheomalacia s/p tracheoplasty, tracheobronchomalacia, pAfib (Eliquis), colorectal ca s/p colostomy, aortic dissection s/p ascending aorta repair who p/w acute on chronic shortness of breath in the setting of recent completion of abx and tapering of her steroids as an outpatient.     #SOB - Likely 2/2 bronchiectasis flare and asthma exacerbation  #Bronchiectasis flare  #Acute Asthma exacerbation  - ID recs appreciated, s/p Ertapenem, currently on 7 day course of Linezolid  - c/w albuterol/7% with airway clearance q6h  - Please add Aerobika or Acapella q6h for airway clearance  - c/w MethylPred 24mg PO daily, anticipate extended taper decreasing by 8mg per week until back at maintenance dose (8 mg PO daily)  - c/w Budesonide 0.5mg q12h   - c/w Bactrim for PJP ppx  - PT/OT, OOB to chair as much as possible    Pulmonary will sign off. Patient needs follow up with her pulmonologist Dr. Eulalio Allison on discharge. Please call back if any questions.     Shaan Shah MD  Pulmonary & Critical Care

## 2023-08-23 NOTE — DIETITIAN INITIAL EVALUATION ADULT - PERTINENT LABORATORY DATA
08-23    141  |  106  |  24<H>  ----------------------------<  164<H>  4.6   |  23  |  0.71    Ca    8.6      23 Aug 2023 07:12  Phos  3.5     08-23  Mg     2.2     08-23    TPro  5.4<L>  /  Alb  3.4  /  TBili  0.8  /  DBili  x   /  AST  55<H>  /  ALT  21  /  AlkPhos  60  08-23  POCT Blood Glucose.: 125 mg/dL (08-23-23 @ 10:51)  A1C with Estimated Average Glucose Result: 9.1 % (06-17-23 @ 10:27)  A1C with Estimated Average Glucose Result: 9.4 % (05-12-23 @ 07:40)  A1C with Estimated Average Glucose Result: 10.3 % (03-05-23 @ 06:35)

## 2023-08-23 NOTE — DIETITIAN INITIAL EVALUATION ADULT - NSFNSADHERENCEPTAFT_GEN_A_CORE
Pt reports compliance with diabetic diet PTA. HbA1c 9.1% (6/17), no recent HbA1c to assess. Indicates poor glycemic control. Pt has Freestyle Luis E at home per Endocrine notes. Current fingersticks elevated, likely exacerbated by steroids. Anticipate downtrending sugars as steroid is tapered.

## 2023-08-23 NOTE — PROGRESS NOTE ADULT - SUBJECTIVE AND OBJECTIVE BOX
Patient is a 72y old  Female who presents with a chief complaint of sob (23 Aug 2023 14:26)       INTERVAL HPI/OVERNIGHT EVENTS:  Patient seen and evaluated at bedside.  Pt is resting comfortable in NAD.    Allergies    tetanus toxoid (Short breath)  cefepime (Anaphylaxis)  penicillin (Anaphylaxis)  Avelox (Short breath; Pruritus)  Dilaudid (Short breath)  codeine (Short breath)  aspirin (Short breath)  iodine (Short breath; Swelling)  Valium (Short breath)  shellfish (Anaphylaxis)    Intolerances        Vital Signs Last 24 Hrs  T(C): 36.7 (23 Aug 2023 14:20), Max: 36.7 (22 Aug 2023 20:56)  T(F): 98.1 (23 Aug 2023 14:20), Max: 98.1 (23 Aug 2023 05:55)  HR: 78 (23 Aug 2023 14:20) (76 - 89)  BP: 100/63 (23 Aug 2023 14:20) (95/58 - 111/62)  BP(mean): --  RR: 18 (23 Aug 2023 14:20) (18 - 18)  SpO2: 100% (23 Aug 2023 14:20) (95% - 100%)    Parameters below as of 23 Aug 2023 14:20  Patient On (Oxygen Delivery Method): room air        LABS:                        7.2    13.41 )-----------( 226      ( 23 Aug 2023 07:16 )             24.9     08-23    141  |  106  |  24<H>  ----------------------------<  164<H>  4.6   |  23  |  0.71    Ca    8.6      23 Aug 2023 07:12  Phos  3.5     08-23  Mg     2.2     08-23    TPro  5.4<L>  /  Alb  3.4  /  TBili  0.8  /  DBili  x   /  AST  55<H>  /  ALT  21  /  AlkPhos  60  08-23      Urinalysis Basic - ( 23 Aug 2023 07:12 )    Color: x / Appearance: x / SG: x / pH: x  Gluc: 164 mg/dL / Ketone: x  / Bili: x / Urobili: x   Blood: x / Protein: x / Nitrite: x   Leuk Esterase: x / RBC: x / WBC x   Sq Epi: x / Non Sq Epi: x / Bacteria: x      CAPILLARY BLOOD GLUCOSE      POCT Blood Glucose.: 125 mg/dL (23 Aug 2023 10:51)  POCT Blood Glucose.: 175 mg/dL (23 Aug 2023 01:46)  POCT Blood Glucose.: 296 mg/dL (22 Aug 2023 21:14)  POCT Blood Glucose.: 167 mg/dL (22 Aug 2023 17:47)      Lower Extremity Physical Exam:    Vascular: DP/PT 1/4,right foot, left foot non-palpable, CFT <3 seconds B/L, Temperature gradient wnl, B/L.   Neuro: Epicritic sensation present to the level of toes, B/L.  Musculoskeletal/Ortho: ulcer posterior left heel with no signs of infection no signs of cellulitis left foot partial thickness secondary to pressure present on admission

## 2023-08-23 NOTE — DIETITIAN INITIAL EVALUATION ADULT - ADD RECOMMEND
1) continue consistent carbohydrate diet + glucerna shakes 3x/day (220 kcal, 10 g Pro/8oz)  2) Malnutrition sticker placed, RD to follow-up as per protocol   3) RD to honor food preferences as offered/able  4) reattempt diet education at f/u and PRN  5) trend wts  6) adjust ISS per Endo recs  7) Recommend MVI and vit C for wound healing  8) Monitor PO intake, weight, labs, skin, GI status, diet

## 2023-08-23 NOTE — PROGRESS NOTE ADULT - NUTRITIONAL ASSESSMENT
Diet, Regular:   Consistent Carbohydrate {Evening Snack} (CSTCHOSN)  Supplement Feeding Modality:  Oral  Glucerna Shake Cans or Servings Per Day:  3       Frequency:  Three Times a day (08-18-23 @ 11:56) [Active]    Please see RD assessment and/or follow up.  Managed by primary team as well

## 2023-08-23 NOTE — DIETITIAN INITIAL EVALUATION ADULT - EDUCATION DIETARY MODIFICATIONS
consistent carbohydrate diet; adequate protein/energy intake/unable to verbalize/demonstrate/(0) unable to meet; needs instruction

## 2023-08-23 NOTE — DIETITIAN INITIAL EVALUATION ADULT - REASON FOR ADMISSION
Per chart, Pt is a 71 y/o F with PMH: "bronchiectasis, trachomalacia on chronic steroids, DM, pAfib on Eliquis, colorectal ca yrs ago s/p colostomy, recent admission for parainfluenza PNA on various antibiotics see HPI . P/w SOB, found to have bronchial secretions on CT, admit to medicine for exacerbation of bronchiectasis. Endo c/s for bid steroid induced hyperglycemia 8/16. Sputum cx w/ S. aureus, started linezolid per ID 8/18."

## 2023-08-23 NOTE — PROGRESS NOTE ADULT - SUBJECTIVE AND OBJECTIVE BOX
CHIEF COMPLAINT: SOB    Interval Events: Continues to improve, has not gotten airway clearance device yet.    REVIEW OF SYSTEMS:  Constitutional: [ ] negative [ ] fevers [ ] chills [ ] weight loss [ ] weight gain  HEENT: [ ] negative [ ] dry eyes [ ] eye irritation [ ] postnasal drip [ ] nasal congestion  CV: [ ] negative  [ ] chest pain [ ] orthopnea [ ] palpitations [ ] murmur  Resp: [ ] negative [X] cough [ ] shortness of breath [ ] dyspnea [ ] wheezing [X] sputum [ ] hemoptysis  GI: [ ] negative [ ] nausea [ ] vomiting [ ] diarrhea [ ] constipation [ ] abd pain [ ] dysphagia   : [ ] negative [ ] dysuria [ ] nocturia [ ] hematuria [ ] increased urinary frequency  Musculoskeletal: [ ] negative [ ] back pain [ ] myalgias [ ] arthralgias [ ] fracture  Skin: [ ] negative [ ] rash [ ] itch  Neurological: [ ] negative [ ] headache [ ] dizziness [ ] syncope [ ] weakness [ ] numbness  Psychiatric: [ ] negative [ ] anxiety [ ] depression  Endocrine: [ ] negative [ ] diabetes [ ] thyroid problem  Hematologic/Lymphatic: [ ] negative [ ] anemia [ ] bleeding problem  Allergic/Immunologic: [ ] negative [ ] itchy eyes [ ] nasal discharge [ ] hives [ ] angioedema  [X] All other systems negative  [ ] Unable to assess ROS because ________    OBJECTIVE:  ICU Vital Signs Last 24 Hrs  T(C): 36.7 (23 Aug 2023 05:55), Max: 36.8 (22 Aug 2023 12:47)  T(F): 98.1 (23 Aug 2023 05:55), Max: 98.3 (22 Aug 2023 12:47)  HR: 76 (23 Aug 2023 05:55) (76 - 102)  BP: 95/58 (23 Aug 2023 05:55) (95/58 - 151/73)  BP(mean): --  ABP: --  ABP(mean): --  RR: 18 (23 Aug 2023 05:55) (18 - 18)  SpO2: 100% (23 Aug 2023 05:55) (95% - 100%)    O2 Parameters below as of 23 Aug 2023 05:55  Patient On (Oxygen Delivery Method): room air              08-22 @ 07:01 - 08-23 @ 07:00  --------------------------------------------------------  IN: 240 mL / OUT: 0 mL / NET: 240 mL      CAPILLARY BLOOD GLUCOSE      POCT Blood Glucose.: 175 mg/dL (23 Aug 2023 01:46)      PHYSICAL EXAM:  General: NAD, resting comforrably  HEENT: EOMI, PERRLA  Neck: Supple  Respiratory: Scattered crackles  Cardiovascular: S1S2, RRR  Abdomen: Soft, NTND, BS+  Extremities: No edema  Skin: Warm and dry  Neurological: No gross focal deficits      HOSPITAL MEDICATIONS:  MEDICATIONS  (STANDING):  albuterol/ipratropium for Nebulization 3 milliLiter(s) Nebulizer every 6 hours  apixaban 5 milliGRAM(s) Oral every 12 hours  atorvastatin 20 milliGRAM(s) Oral at bedtime  buDESOnide    Inhalation Suspension 0.5 milliGRAM(s) Inhalation two times a day  chlorhexidine 4% Liquid 1 Application(s) Topical daily  dextrose 5%. 1000 milliLiter(s) (50 mL/Hr) IV Continuous <Continuous>  dextrose 5%. 1000 milliLiter(s) (100 mL/Hr) IV Continuous <Continuous>  dextrose 50% Injectable 12.5 Gram(s) IV Push once  dextrose 50% Injectable 25 Gram(s) IV Push once  dextrose 50% Injectable 25 Gram(s) IV Push once  glucagon  Injectable 1 milliGRAM(s) IntraMuscular once  insulin glargine Injectable (LANTUS) 28 Unit(s) SubCutaneous at bedtime  insulin lispro (ADMELOG) corrective regimen sliding scale   SubCutaneous three times a day before meals  insulin lispro (ADMELOG) corrective regimen sliding scale   SubCutaneous at bedtime  insulin lispro Injectable (ADMELOG) 14 Unit(s) SubCutaneous three times a day with meals  linezolid    Tablet 600 milliGRAM(s) Oral every 12 hours  methylPREDNISolone   Oral   methylPREDNISolone 24 milliGRAM(s) Oral daily  mexiletine 200 milliGRAM(s) Oral every 8 hours  nystatin    Suspension 584521 Unit(s) Swish and Swallow two times a day  pantoprazole    Tablet 40 milliGRAM(s) Oral before breakfast  polyethylene glycol 3350 17 Gram(s) Oral two times a day  senna 2 Tablet(s) Oral at bedtime  sodium chloride 7% Inhalation 4 milliLiter(s) Inhalation every 6 hours  tiotropium 2.5 MICROgram(s) Inhaler 2 Puff(s) Inhalation daily  trimethoprim  160 mG/sulfamethoxazole 800 mG 1 Tablet(s) Oral daily    MEDICATIONS  (PRN):  acetaminophen     Tablet .. 650 milliGRAM(s) Oral every 6 hours PRN Temp greater or equal to 38C (100.4F), Mild Pain (1 - 3)  aluminum hydroxide/magnesium hydroxide/simethicone Suspension 30 milliLiter(s) Oral every 4 hours PRN Dyspepsia  dextrose Oral Gel 15 Gram(s) Oral once PRN Blood Glucose LESS THAN 70 milliGRAM(s)/deciliter  diphenhydrAMINE 25 milliGRAM(s) Oral daily PRN Allergy symptoms  melatonin 3 milliGRAM(s) Oral at bedtime PRN Insomnia  ondansetron Injectable 4 milliGRAM(s) IV Push every 8 hours PRN Nausea and/or Vomiting      LABS:                        7.2    13.41 )-----------( 226      ( 23 Aug 2023 07:16 )             24.9     Hgb Trend: 7.2<--, 7.3<--, 8.0<--, 7.5<--, 8.5<--  08-23    141  |  106  |  24<H>  ----------------------------<  164<H>  4.6   |  23  |  0.71    Ca    8.6      23 Aug 2023 07:12  Phos  3.5     08-23  Mg     2.2     08-23    TPro  5.4<L>  /  Alb  3.4  /  TBili  0.8  /  DBili  x   /  AST  55<H>  /  ALT  21  /  AlkPhos  60  08-23    Creatinine Trend: 0.71<--, 0.76<--, 0.70<--, 0.75<--, 0.65<--, 0.79<--    Urinalysis Basic - ( 23 Aug 2023 07:12 )    Color: x / Appearance: x / SG: x / pH: x  Gluc: 164 mg/dL / Ketone: x  / Bili: x / Urobili: x   Blood: x / Protein: x / Nitrite: x   Leuk Esterase: x / RBC: x / WBC x   Sq Epi: x / Non Sq Epi: x / Bacteria: x            MICROBIOLOGY:       RADIOLOGY:  [ ] Reviewed and interpreted by me    PULMONARY FUNCTION TESTS:    EKG:

## 2023-08-23 NOTE — DIETITIAN INITIAL EVALUATION ADULT - OTHER INFO
dosing wt: 63 kg (8/11)  daily wt: 64.9 kg (8/16)  wt hx per chart review: 63 kg (6/6), 66.8 kg (5/9 +edema), 65.4 kg (9/24/22) *no significant wt changes noted  reported UBW: 65 kg, Pt reports wt has been stable

## 2023-08-23 NOTE — DIETITIAN INITIAL EVALUATION ADULT - ORAL INTAKE PTA/DIET HISTORY
Chart reviewed, events noted. Pt reports appetite is good. No issues chewing/swallowing. Pt allergic to shellfish.

## 2023-08-23 NOTE — PROGRESS NOTE ADULT - PROBLEM/PLAN-9
DISPLAY PLAN FREE TEXT Hpi Title: Evaluation of Skin Lesions Additional History: Denies any concerning lesions at this time

## 2023-08-23 NOTE — DIETITIAN INITIAL EVALUATION ADULT - ENERGY INTAKE
Adequate (%) Pt reports she has no issues with her appetite but does have issues with hospital foods. Pt was placing order with the diet office prior to RD visit. Pt states the chicken and salmon are too dry so she needs extra sauce or gravy, the soup is too watery, and there are not enough dessert options. Overall Pt reports the menu lacks variety. Provided Pt with new menu and encouraged Pt continue to place her orders with the diet office in order to ensure Pt receives what she wants. Pt does not eat pork and limits rice intake 2/2 carbohydrate content. Pt likes Glucerna shakes but wishes to alternate between strawberry and vanilla flavors. Attempted to give consistent carbohydrate diet education however Pt not receptive.

## 2023-08-23 NOTE — PROGRESS NOTE ADULT - PROBLEM SELECTOR PLAN 1
Test BG ac and hs and 2am while on steroids  C/w Lantus dose  28 units qhs for now  C/w Admelog 14 units qac for now.  Will follow  C/w Mod correction scale qac + bedtime  Please contact endo team with any changes on steroid doses   Discharge:  Will be determined according to BG/insulin needs/PO intake and steroid therapy at time of discharge.  Plan to d/c on basal bolus. Doses TBD  Outpt. endo follow-up. Dr Saavedra  Outpt. optho, podiatry, micro/cr  Make sure pt has Rx for all DM supplies and insulin/ DM meds. Consider FreeVirtustream Luis E 3 if going home

## 2023-08-23 NOTE — DIETITIAN INITIAL EVALUATION ADULT - NSICDXPASTMEDICALHX_GEN_ALL_CORE_FT
PAST MEDICAL HISTORY:  Adrenal insufficiency Medrol daily for over 50 years    Aortic insufficiency moderate AR on echo 5/3/2018    Asthma     Atrial fibrillation paroxysmal, on eliquis    Colorectal cancer 4/2018- last treatment , chemo and radiation    COPD (chronic obstructive pulmonary disease)     Diabetes Type 2    DVT (deep venous thrombosis) 15-20 years ago, took coumadin    Pelvic fracture     Rectal bleeding     Seizure x 1 1/7/18    TIA (transient ischemic attack) multiple, last 5 years ago - presents as right-sided weakness    Tracheobronchomalacia diagnosed 2015, s/p bronchial thermoplasty 2016 (Dr Zapine); recent bronchoscopy 6/5/2018 revealed no evidence of tracheobronchomalacia in trachea or bronchial tubes

## 2023-08-23 NOTE — PROGRESS NOTE ADULT - ASSESSMENT
73 yo PMHx  asthma on chronic steroids, bronchiectasis s/p surgery, allergic rhinitis,  recurrent PNA, PND, tracheomalacia s/p tracheoplasty, ESBL proteus infections (sputum),  diabetes, paroxysmal A-fib on Eliquis, colorectal cancer many years ago status post colostomy presenting with concern for shortness of breath. Prior issues with ESBL/Proteus/yeast in sputum culture and was treated with ertapenem/Benadryl/vancomycin/aztreonam and methylprednisolone.   She was discharged home with a midline and completed a course of antibiotics ertapenem (last dose 06/25) PT now readmitted with severe dyspnea and reports that having had asthma for 61 years she feels like she needs an antibiotic. Reports not tolerating meropenem. Patient follows with Dr Allison who knows this complicated pt very well  Noted  pt has PCN, cefepime allergies    RECOMMENDATIONS  -no evidence of airspace disease so suspect likely Bronchiectasis exacerbation  -recent sputums with ESBL proteus so having tolerated ertapenem and was restarted   -Repeat CXR with no pneumonia   -Fungitell negative     -8/15 Sputum culture with normal resp val   -8/14 Sputum AFB smear negative -f/u culture to rule out DIEUDONNE  -8/17 CF sputum culture with mod Staph aureus-MRSA, Pantoea (previously Enterobacter) and normal resp val   - sensitivities noted; recently completed course of ertapenem 8/12-8/18, now on linezolid     -leukocytosis trend noted -overall stable - suspect likely reactive in setting of steroids   - no new complaints or focal findings on exam; remains afebrile    Pulmonary following - changed to po steroid 8/19  Follow AFB, fungal sputum cultures   Continue linezolid day 6 of 7  S/p ertapenem (8/12-8/18)  Supportive care, chest PT, neb treatments   Monitor temps/CBC      Tello Fernandez M.D.  [facility], Division of Infectious Diseases  [phone]  After 5pm on weekdays and all day on weekends - please call [phone]

## 2023-08-23 NOTE — DIETITIAN INITIAL EVALUATION ADULT - ETIOLOGY
increased demand for wound healing bronchiectasis/likely inability to meet increased nutritient needs

## 2023-08-23 NOTE — DIETITIAN INITIAL EVALUATION ADULT - PERTINENT MEDS FT
MEDICATIONS  (STANDING):  albuterol/ipratropium for Nebulization 3 milliLiter(s) Nebulizer every 6 hours  apixaban 5 milliGRAM(s) Oral every 12 hours  atorvastatin 20 milliGRAM(s) Oral at bedtime  buDESOnide    Inhalation Suspension 0.5 milliGRAM(s) Inhalation two times a day  chlorhexidine 4% Liquid 1 Application(s) Topical daily  dextrose 5%. 1000 milliLiter(s) (50 mL/Hr) IV Continuous <Continuous>  dextrose 5%. 1000 milliLiter(s) (100 mL/Hr) IV Continuous <Continuous>  dextrose 50% Injectable 25 Gram(s) IV Push once  dextrose 50% Injectable 12.5 Gram(s) IV Push once  dextrose 50% Injectable 25 Gram(s) IV Push once  glucagon  Injectable 1 milliGRAM(s) IntraMuscular once  insulin glargine Injectable (LANTUS) 28 Unit(s) SubCutaneous at bedtime  insulin lispro (ADMELOG) corrective regimen sliding scale   SubCutaneous three times a day before meals  insulin lispro (ADMELOG) corrective regimen sliding scale   SubCutaneous at bedtime  insulin lispro Injectable (ADMELOG) 14 Unit(s) SubCutaneous three times a day with meals  linezolid    Tablet 600 milliGRAM(s) Oral every 12 hours  methylPREDNISolone   Oral   methylPREDNISolone 24 milliGRAM(s) Oral daily  mexiletine 200 milliGRAM(s) Oral every 8 hours  nystatin    Suspension 101672 Unit(s) Swish and Swallow two times a day  pantoprazole    Tablet 40 milliGRAM(s) Oral before breakfast  polyethylene glycol 3350 17 Gram(s) Oral two times a day  senna 2 Tablet(s) Oral at bedtime  sodium chloride 7% Inhalation 4 milliLiter(s) Inhalation every 6 hours  tiotropium 2.5 MICROgram(s) Inhaler 2 Puff(s) Inhalation daily  trimethoprim  160 mG/sulfamethoxazole 800 mG 1 Tablet(s) Oral daily    MEDICATIONS  (PRN):  acetaminophen     Tablet .. 650 milliGRAM(s) Oral every 6 hours PRN Temp greater or equal to 38C (100.4F), Mild Pain (1 - 3)  aluminum hydroxide/magnesium hydroxide/simethicone Suspension 30 milliLiter(s) Oral every 4 hours PRN Dyspepsia  dextrose Oral Gel 15 Gram(s) Oral once PRN Blood Glucose LESS THAN 70 milliGRAM(s)/deciliter  diphenhydrAMINE 25 milliGRAM(s) Oral daily PRN Allergy symptoms  melatonin 3 milliGRAM(s) Oral at bedtime PRN Insomnia  ondansetron Injectable 4 milliGRAM(s) IV Push every 8 hours PRN Nausea and/or Vomiting

## 2023-08-23 NOTE — DIETITIAN INITIAL EVALUATION ADULT - PROBLEM SELECTOR PLAN 4
-on 25-30 lantus at home, and 7-20 mealtime as well  -ISS moderate risk   -change to 20 lantus, and 7 meal time for now.  - Will likely need to uptitrate given increase in steroids and hx of uncontrolled diabetes.

## 2023-08-23 NOTE — PROGRESS NOTE ADULT - PROBLEM SELECTOR PLAN 1
- CT chest showed increased bronchial secretions, on RA saturating well, speaking in full sentences, unclear trigger, likely due to infection, or inflammation.     - sputum culture grew MRSA, on linezolid, ID following  - hypertonic saline 7% and albuterol neb q6h standing, followed by use of airway clearance device (patient brought her own from home)  - chest PT as tolerated   - budesonide 0.5mg neb q12h standing   - now on solumedrol taper - 24mg, decrease by 8mg every week to 8 mg  - pulm following, appreciate recs  - echo: hyperdynamic LV, likely normal RV function

## 2023-08-23 NOTE — PROGRESS NOTE ADULT - SUBJECTIVE AND OBJECTIVE BOX
Lázaro Molina, PGY-1  Please contact on Teams    RAYRAY RODRIGUEZ  72y  Female    Reason for Admission:       SUBJECTIVE/INTERVAL EVENTS: No acute events. Pt seen and examined at bedside.    PHYSICAL EXAM:  PHYSICAL EXAM:  GENERAL: NAD, lying comfortably in bed   HEAD:  Atraumatic, Normocephalic  EYES: EOMI b/l, PERRLA b/l, conjunctiva and sclera clear  NECK: Supple, No JVD, No LAD   CHEST/LUNG: coarse breath sounds, similar to prior  HEART: Regular rate and rhythm; S1 and S2 present  ABDOMEN: Soft, Nontender, Nondistended; Bowel sounds present  EXTREMITIES:  No clubbing, cyanosis, or edema  NEURO: AAOx3, non-focal   SKIN: No rashes       Vital Signs Last 24 Hrs  T(C): 36.7 (23 Aug 2023 14:20), Max: 36.7 (22 Aug 2023 20:56)  T(F): 98.1 (23 Aug 2023 14:20), Max: 98.1 (23 Aug 2023 05:55)  HR: 78 (23 Aug 2023 14:20) (76 - 90)  BP: 100/63 (23 Aug 2023 14:20) (95/58 - 119/74)  BP(mean): --  RR: 18 (23 Aug 2023 14:20) (18 - 18)  SpO2: 100% (23 Aug 2023 14:20) (95% - 100%)    Parameters below as of 23 Aug 2023 14:20  Patient On (Oxygen Delivery Method): room air        Consultant(s) Notes Reviewed:  [x] YES  [ ] NO  Care Discussed with Consultants/Other Providers [x] YES  [ ] NO    LABS:                        7.2    13.41 )-----------( 226      ( 23 Aug 2023 07:16 )             24.9                         7.3    11.69 )-----------( 207      ( 22 Aug 2023 06:39 )             25.4                         8.0    12.71 )-----------( 224      ( 21 Aug 2023 16:38 )             27.9                               141    |  106    |  24                  Calcium: 8.6   / iCa: x      (08-23 @ 07:12)    ----------------------------<  164       Magnesium: 2.2                              4.6     |  23     |  0.71             Phosphorous: 3.5      TPro  5.4    /  Alb  3.4    /  TBili  0.8    /  DBili  x      /  AST  55     /  ALT  21     /  AlkPhos  60     23 Aug 2023 07:12    Urinalysis Basic - ( 23 Aug 2023 07:12 )    Color: x / Appearance: x / SG: x / pH: x  Gluc: 164 mg/dL / Ketone: x  / Bili: x / Urobili: x   Blood: x / Protein: x / Nitrite: x   Leuk Esterase: x / RBC: x / WBC x   Sq Epi: x / Non Sq Epi: x / Bacteria: x        RADIOLOGY & ADDITIONAL TESTS:    Imaging Personally Reviewed:  [x] YES  [ ] NO    MEDICATIONS  (STANDING):  albuterol/ipratropium for Nebulization 3 milliLiter(s) Nebulizer every 6 hours  apixaban 5 milliGRAM(s) Oral every 12 hours  atorvastatin 20 milliGRAM(s) Oral at bedtime  buDESOnide    Inhalation Suspension 0.5 milliGRAM(s) Inhalation two times a day  chlorhexidine 4% Liquid 1 Application(s) Topical daily  dextrose 5%. 1000 milliLiter(s) (50 mL/Hr) IV Continuous <Continuous>  dextrose 5%. 1000 milliLiter(s) (100 mL/Hr) IV Continuous <Continuous>  dextrose 50% Injectable 12.5 Gram(s) IV Push once  dextrose 50% Injectable 25 Gram(s) IV Push once  dextrose 50% Injectable 25 Gram(s) IV Push once  glucagon  Injectable 1 milliGRAM(s) IntraMuscular once  insulin glargine Injectable (LANTUS) 28 Unit(s) SubCutaneous at bedtime  insulin lispro (ADMELOG) corrective regimen sliding scale   SubCutaneous three times a day before meals  insulin lispro (ADMELOG) corrective regimen sliding scale   SubCutaneous at bedtime  insulin lispro Injectable (ADMELOG) 14 Unit(s) SubCutaneous three times a day with meals  linezolid    Tablet 600 milliGRAM(s) Oral every 12 hours  methylPREDNISolone   Oral   methylPREDNISolone 24 milliGRAM(s) Oral daily  mexiletine 200 milliGRAM(s) Oral every 8 hours  nystatin    Suspension 464560 Unit(s) Swish and Swallow two times a day  pantoprazole    Tablet 40 milliGRAM(s) Oral before breakfast  polyethylene glycol 3350 17 Gram(s) Oral two times a day  senna 2 Tablet(s) Oral at bedtime  sodium chloride 7% Inhalation 4 milliLiter(s) Inhalation every 6 hours  tiotropium 2.5 MICROgram(s) Inhaler 2 Puff(s) Inhalation daily  trimethoprim  160 mG/sulfamethoxazole 800 mG 1 Tablet(s) Oral daily    MEDICATIONS  (PRN):  acetaminophen     Tablet .. 650 milliGRAM(s) Oral every 6 hours PRN Temp greater or equal to 38C (100.4F), Mild Pain (1 - 3)  aluminum hydroxide/magnesium hydroxide/simethicone Suspension 30 milliLiter(s) Oral every 4 hours PRN Dyspepsia  dextrose Oral Gel 15 Gram(s) Oral once PRN Blood Glucose LESS THAN 70 milliGRAM(s)/deciliter  diphenhydrAMINE 25 milliGRAM(s) Oral daily PRN Allergy symptoms  melatonin 3 milliGRAM(s) Oral at bedtime PRN Insomnia  ondansetron Injectable 4 milliGRAM(s) IV Push every 8 hours PRN Nausea and/or Vomiting      HEALTH ISSUES - PROBLEM Dx:  Acute exacerbation of bronchiectasis    Acute asthma exacerbation    SIRS without infection or organ dysfunction    Atrial fibrillation  paroxysmal, on eliquis    Diabetes mellitus    Prophylactic measure    Uncontrolled type 2 diabetes mellitus with hyperglycemia    Essential hypertension    Hyperlipidemia    H/O adrenal insufficiency

## 2023-08-23 NOTE — PROGRESS NOTE ADULT - NUTRITIONAL ASSESSMENT
This patient has been assessed with a concern for Malnutrition and has been determined to have a diagnosis/diagnoses of Moderate protein-calorie malnutrition.    This patient is being managed with:   Diet Regular-  Consistent Carbohydrate {Evening Snack} (CSTCHOSN)  Supplement Feeding Modality:  Oral  Glucerna Shake Cans or Servings Per Day:  3       Frequency:  Three Times a day  Entered: Aug 18 2023 11:56AM

## 2023-08-23 NOTE — DIETITIAN INITIAL EVALUATION ADULT - NSFNSGIIOFT_GEN_A_CORE
Pt denies any N/V/D/C. No issues with colostomy bag, Pt reports she has been caring for it for years. Large, pasty, dark brown BM documented on RN flowsheets 8/22. Pt ordered for Miralax and Senna.

## 2023-08-23 NOTE — PROGRESS NOTE ADULT - ASSESSMENT
patient seen at bedside      LOWER EXTREMITY PHYSICAL EXAM:    Vascular: DP/PT 1/4,right foot, left foot non-palpable, CFT <3 seconds B/L, Temperature gradient wnl, B/L.   Neuro: Epicritic sensation present to the level of toes, B/L.  Musculoskeletal/Ortho: ulcer posterior left heel with no signs of infection no signs of cellulitis left foot  continue with allevyn pads left foot  patient refuses z float boots but will use pillows under her calves to avoid heel pressure  podiatry signing off at this time, reconsult as needed

## 2023-08-23 NOTE — PROGRESS NOTE ADULT - ASSESSMENT
72 F w/h/o uncontrolled T2DM (A1C 9.4%) on basal/bolus insulin PTA. Unknown DM complications. Also COPD, secondary adrenal Insufficiency on chronic steroids, colorectal cancer s/p resection (colostomy bag), Chronic A fib on Eliquis, and tracheomalacia and multiple intubations, type A aortic dissection s/p aortic dissection repair on 9/07/22, sepsis. Pt well known to this provider from prior admissions. Here with SOB> treated for PNA and on steroid pulse coming down to Prednisolone to 24mg tomorrow. Endocrine consulted for assistance with uncontrolled DM/steroids for AI on steroid pulse for SOB with BG  improving while on steroid taper. No hypoglycemia but pt getting breakfast at lunch time. Per RN, pt requesting breakfast late so it gets delivered close to lunch time. Will continue with present insulin doses to keep BG goal 100s to 180s. Pt reports she wears Freestyle Luis E 14 at home but has been in and out of hospital since may. Will start Freestyle Luis E 3 if pt going home. Needs keshia on phone but pt states she wants daughter to do it.

## 2023-08-23 NOTE — DIETITIAN INITIAL EVALUATION ADULT - NS FNS DIET ORDER
Diet, Regular:   Consistent Carbohydrate {Evening Snack} (CSTCHOSN)  Supplement Feeding Modality:  Oral  Glucerna Shake Cans or Servings Per Day:  3       Frequency:  Three Times a day (08-18-23 @ 11:56) [Active]

## 2023-08-23 NOTE — DIETITIAN INITIAL EVALUATION ADULT - NSICDXPASTSURGICALHX_GEN_ALL_CORE_FT
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    H/O pelvic surgery 5 years ago - s/p fracture    H/O total knee replacement, bilateral 5 years ago    History of partial hysterectomy 30 years ago - fibroids    History of sinus surgery multiple sinus surgeries    History of tracheomalacia 2015 - attempted tracheal stenting ([facility])- course complicated by obstruction, respiratory failure, multiple CPR attempts -  stent discontinued; 10/20/2016 Tracheobronchoplasty (Prolene Mesh) performed at [facility] by Dr Zapien    Rectal bleeding exam under anesthesia ([facility]) 2/2018    S/P bronchoscopy 6/5/2018 - [facility] (Dr Zapien) no evidence of tracheobronchomalacia in trachea or bronchial tubes

## 2023-08-23 NOTE — PROGRESS NOTE ADULT - SUBJECTIVE AND OBJECTIVE BOX
DIABETES FOLLOW UP NOTE: Saw pt earlier today    Chief Complaint: Endocrine consult requested for management of T2DM    INTERVAL HX: Pt stable, reports feeling and eating well but now concern about anemia> awaiting hematology consult. BG levels better today but per RN pt getting meals delivered late so  breakfast today was close to lunch time and pt will wait for dinner for next meal. BG today in 100s while on present insulin doses. Pt on Methylprednisolone 24mg/day x 7 days tapering down to base line dose of 8mg daily for AI.          Review of Systems:  General: As above  Cardiovascular: No chest pain, palpitations  Respiratory: No SOB, no cough  GI: No nausea, vomiting, abdominal pain  Endocrine: No polyuria, polydipsia or S&Sx of hypoglycemia    Allergies    tetanus toxoid (Short breath)  cefepime (Anaphylaxis)  penicillin (Anaphylaxis)  Avelox (Short breath; Pruritus)  Dilaudid (Short breath)  codeine (Short breath)  aspirin (Short breath)  iodine (Short breath; Swelling)  Valium (Short breath)  shellfish (Anaphylaxis)    Intolerances      MEDICATIONS:  atorvastatin 20 milliGRAM(s) Oral at bedtime  insulin glargine Injectable (LANTUS) 28 Unit(s) SubCutaneous at bedtime  insulin lispro (ADMELOG) corrective regimen sliding scale   SubCutaneous three times a day before meals  insulin lispro (ADMELOG) corrective regimen sliding scale   SubCutaneous at bedtime  insulin lispro Injectable (ADMELOG) 14 Unit(s) SubCutaneous three times a day with meals  methylPREDNISolone 24 milliGRAM(s) Oral daily  trimethoprim  160 mG/sulfamethoxazole 800 mG 1 Tablet(s) Oral daily      PHYSICAL EXAM:  VITALS: T(C): 36.7 (08-23-23 @ 14:20)  T(F): 98.1 (08-23-23 @ 14:20), Max: 98.1 (08-23-23 @ 05:55)  HR: 78 (08-23-23 @ 14:20) (76 - 89)  BP: 100/63 (08-23-23 @ 14:20) (95/58 - 111/62)  RR:  (18 - 18)  SpO2:  (95% - 100%)  Wt(kg): --  GENERAL: Female laying in bed in NAD  Abdomen: Soft, nontender, non distended  Extremities: Warm, no edema in all 4 exts  NEURO: Alert and able to answer questions      LABS:  POCT Blood Glucose.: 172 mg/dL (08-23-23 @ 17:15)  POCT Blood Glucose.: 125 mg/dL (08-23-23 @ 10:51)  POCT Blood Glucose.: 175 mg/dL (08-23-23 @ 01:46)  POCT Blood Glucose.: 296 mg/dL (08-22-23 @ 21:14)  POCT Blood Glucose.: 167 mg/dL (08-22-23 @ 17:47)  POCT Blood Glucose.: 342 mg/dL (08-22-23 @ 14:40)  POCT Blood Glucose.: 421 mg/dL (08-22-23 @ 12:57)  POCT Blood Glucose.: 137 mg/dL (08-22-23 @ 08:43)  POCT Blood Glucose.: 137 mg/dL (08-22-23 @ 02:00)  POCT Blood Glucose.: 205 mg/dL (08-21-23 @ 21:58)  POCT Blood Glucose.: 239 mg/dL (08-21-23 @ 17:31)  POCT Blood Glucose.: 91 mg/dL (08-21-23 @ 13:00)  POCT Blood Glucose.: 139 mg/dL (08-21-23 @ 08:49)  POCT Blood Glucose.: 88 mg/dL (08-21-23 @ 01:52)  POCT Blood Glucose.: 146 mg/dL (08-20-23 @ 21:31)                            7.2    13.41 )-----------( 226      ( 23 Aug 2023 07:16 )             24.9       08-23    141  |  106  |  24<H>  ----------------------------<  164<H>  4.6   |  23  |  0.71    eGFR: 89    Ca    8.6      08-23  Mg     2.2     08-23  Phos  3.5     08-23    TPro  5.4<L>  /  Alb  3.4  /  TBili  0.8  /  DBili  x   /  AST  55<H>  /  ALT  21  /  AlkPhos  60  08-23      A1C with Estimated Average Glucose Result: 9.1 % (06-17-23 @ 10:27)      Estimated Average Glucose: 214 mg/dL (06-17-23 @ 10:27)

## 2023-08-23 NOTE — DIETITIAN INITIAL EVALUATION ADULT - PROBLEM SELECTOR PLAN 1
- CT chest showed increased bronchial secretions, on RA saturating well, speaking in full sentences, unclear trigger, likely due to infection, or inflammation.     - Sputum cultures , urine legionella, strep   - RVP negative  - hypertonic saline and albuterol neb q6h standing, followed by use of airway clerance device (patient brought her own from home)  - chest PT as tolerated   - budesonide neb q12h standing   - Monitor off antibiotics  - increase methylprednisolone to 40 mg IV bid for now, with taper to 40 mg IV daily in 1-2 days   - pulm following

## 2023-08-23 NOTE — PROGRESS NOTE ADULT - SUBJECTIVE AND OBJECTIVE BOX
[facility]  GISSELL Uriostegui Y. Patel, S. Shah, G. Casimir  [phone]  ([phone] - weekdays after 5pm and weekends)    Name: RAYRAY RODRIGUEZ  Age/Gender: 72y Female  MRN: [id number]    Interval History:  Patient seen and examined this morning.   States other than cough, she continues to feel better   No new complaints noted.  Notes reviewed  No concerning overnight events  Afebrile     Allergies: tetanus toxoid (Short breath)  cefepime (Anaphylaxis)  penicillin (Anaphylaxis)  Avelox (Short breath; Pruritus)  Dilaudid (Short breath)  codeine (Short breath)  aspirin (Short breath)  iodine (Short breath; Swelling)  Valium (Short breath)  shellfish (Anaphylaxis)      Objective:  Vitals:   T(F): 98.1 (08-23-23 @ 05:55), Max: 98.3 (08-22-23 @ 12:47)  HR: 76 (08-23-23 @ 05:55) (76 - 102)  BP: 95/58 (08-23-23 @ 05:55) (95/58 - 151/73)  RR: 18 (08-23-23 @ 05:55) (18 - 18)  SpO2: 100% (08-23-23 @ 05:55) (95% - 100%)  Physical Examination:  General: no acute distress, RA  HEENT: NC/AT, anicteric, neck supple  Respiratory: no acc muscle use, breathing comfortably  Cardiovascular: S1 and S2 present  Gastrointestinal: normal appearing, nondistended  Extremities: no edema, no cyanosis  Skin: no visible rash    Laboratory Studies:  CBC:                       7.2    13.41 )-----------( 226      ( 23 Aug 2023 07:16 )             24.9     WBC Trend:  13.41 08-23-23 @ 07:16  11.69 08-22-23 @ 06:39  12.71 08-21-23 @ 16:38  12.04 08-21-23 @ 07:26  13.76 08-20-23 @ 06:30  14.13 08-19-23 @ 15:25  15.59 08-17-23 @ 06:32    CMP: 08-23    141  |  106  |  24<H>  ----------------------------<  164<H>  4.6   |  23  |  0.71    Ca    8.6      23 Aug 2023 07:12  Phos  3.5     08-23  Mg     2.2     08-23    TPro  5.4<L>  /  Alb  3.4  /  TBili  0.8  /  DBili  x   /  AST  55<H>  /  ALT  21  /  AlkPhos  60  08-23    Creatinine: 0.71 mg/dL (08-23-23 @ 07:12)  Creatinine: 0.76 mg/dL (08-22-23 @ 06:38)  Creatinine: 0.70 mg/dL (08-21-23 @ 07:29)  Creatinine: 0.75 mg/dL (08-20-23 @ 06:30)  Creatinine: 0.65 mg/dL (08-19-23 @ 15:25)  Creatinine: 0.79 mg/dL (08-18-23 @ 10:39)  Creatinine: 0.72 mg/dL (08-17-23 @ 06:32)      LIVER FUNCTIONS - ( 23 Aug 2023 07:12 )  Alb: 3.4 g/dL / Pro: 5.4 g/dL / ALK PHOS: 60 U/L / ALT: 21 U/L / AST: 55 U/L / GGT: x             Urinalysis Basic - ( 23 Aug 2023 07:12 )    Color: x / Appearance: x / SG: x / pH: x  Gluc: 164 mg/dL / Ketone: x  / Bili: x / Urobili: x   Blood: x / Protein: x / Nitrite: x   Leuk Esterase: x / RBC: x / WBC x   Sq Epi: x / Non Sq Epi: x / Bacteria: x      Microbiology: reviewed     Culture - Sputum, Cystic Fibrosis (collected 08-18-23 @ 23:27)  Source: .Sputum Sputum  Gram Stain (08-19-23 @ 03:39):    No polymorphonuclear leukocytes per low power field    No Squamous epithelial cells per low power field    No organisms seen per oil power field  Final Report (08-22-23 @ 15:32):    Rare Methicillin Resistant Staphylococcus aureus    Rare Normal Respiratory Jessica present  Organism: Methicillin resistant Staphylococcus aureus (08-22-23 @ 15:32)  Organism: Methicillin resistant Staphylococcus aureus (08-22-23 @ 15:32)      -  Clindamycin: S <=0.25      -  Oxacillin: R >2      -  Gentamicin: S <=1 Should not be used as monotherapy      -  Linezolid: S 2      -  Cefazolin: R >16      -  Vancomycin: S 2      -  Tetracycline: S <=1      Method Type: GARRETT      -  Ampicillin/Sulbactam: R <=8/4      -  Penicillin: R >8      -  Rifampin: S <=1 Should not be used as monotherapy      -  Erythromycin: R >4      -  Trimethoprim/Sulfamethoxazole: R >2/38    Culture - Sputum (collected 08-18-23 @ 10:43)  Source: .Sputum Sputum  Gram Stain (08-18-23 @ 20:40):    No polymorphonuclear leukocytes per low power field    Rare Squamous epithelial cells per low power field    Moderate Gram positive cocci in pairs per oil power field    Few Gram Negative Rods per oil power field  Final Report (08-20-23 @ 19:06):    Normal Respiratory Jessica present    Culture - Blood (collected 08-17-23 @ 09:35)  Source: .Blood Blood-Peripheral  Final Report (08-22-23 @ 18:00):    No growth at 5 days    Culture - Blood (collected 08-17-23 @ 09:30)  Source: .Blood Blood-Peripheral  Final Report (08-22-23 @ 18:00):    No growth at 5 days    Culture - Sputum, Cystic Fibrosis (collected 08-17-23 @ 01:18)  Source: .Sputum Sputum  Gram Stain (08-17-23 @ 10:15):    Rare Squamous epithelial cells per low power field    No polymorphonuclear leukocytes per low power field    Rare Gram positive cocci in pairs per oil power field  Final Report (08-21-23 @ 09:41):    Moderate Methicillin Resistant Staphylococcus aureus    Rare Pantoea agglomerans    Numerous Normal Respiratory Jessica present  Organism: Methicillin resistant Staphylococcus aureus  Pantoea agglomerans (Previousley Enterobacter) (08-21-23 @ 09:41)  Organism: Pantoea agglomerans (Previousley Enterobacter) (08-21-23 @ 09:41)      -  Levofloxacin: S <=0.5      -  Tobramycin: S <=2      -  Aztreonam: S <=4      -  Gentamicin: S <=2      -  Cefazolin: S <=2 Enterobacter, Klebsiella aerogenes, Citrobacter, and Serratia may develop resistance during prolonged therapy (3-4 days)      -  Cefepime: S <=2      -  Piperacillin/Tazobactam: S <=8      -  Ciprofloxacin: S <=0.25      -  Ceftriaxone: S <=1 Enterobacter, Klebsiella aerogenes, Citrobacter, and Serratia may develop resistance during prolonged therapy      -  Ampicillin: S <=8 These ampicillin results predict results for amoxicillin      Method Type: GARRETT      -  Meropenem: S <=1      -  Ampicillin/Sulbactam: S <=4/2 Enterobacter, Klebsiella aerogenes, Citrobacter, and Serratia may develop resistance during prolonged therapy (3-4 days)      -  Cefoxitin: S <=8      -  Amikacin: S <=16      -  Amoxicillin/Clavulanic Acid: S <=8/4      -  Trimethoprim/Sulfamethoxazole: S <=0.5/9.5      -  Ertapenem: S <=0.5  Organism: Methicillin resistant Staphylococcus aureus (08-21-23 @ 09:41)      -  Clindamycin: S <=0.25      -  Oxacillin: R >2      -  Gentamicin: S <=1 Should not be used as monotherapy      -  Linezolid: S 2      -  Cefazolin: R >16      -  Vancomycin: S 2      -  Tetracycline: S 2      Method Type: GARRETT      -  Ampicillin/Sulbactam: R <=8/4      -  Penicillin: R >8      -  Rifampin: S <=1 Should not be used as monotherapy      -  Erythromycin: R >4      -  Trimethoprim/Sulfamethoxazole: R >2/38    Culture - Sputum (collected 08-15-23 @ 12:49)  Source: .Sputum Sputum  Gram Stain (08-15-23 @ 21:07):    Rare Squamous epithelial cells per low power field    Rare polymorphonuclear leukocytes per low power field    Rare Yeast like cells per oil power field  Final Report (08-17-23 @ 18:08):    Normal Respiratory Jessica present    Culture - Acid Fast - Sputum w/Smear (collected 08-14-23 @ 13:11)  Source: .Sputum Sputum  Preliminary Report (08-19-23 @ 15:07):    Culture is being performed.    Culture - Blood (collected 08-11-23 @ 16:00)  Source: .Blood Blood-Peripheral  Final Report (08-16-23 @ 20:00):    No growth at 5 days    Culture - Blood (collected 08-11-23 @ 15:15)  Source: .Blood Blood-Peripheral  Final Report (08-16-23 @ 20:00):    No growth at 5 days        SARS-CoV-2 Result: Milagro (11 Aug 2023 16:06)    Radiology: reviewed     Medications:  acetaminophen     Tablet .. 650 milliGRAM(s) Oral every 6 hours PRN  albuterol/ipratropium for Nebulization 3 milliLiter(s) Nebulizer every 6 hours  aluminum hydroxide/magnesium hydroxide/simethicone Suspension 30 milliLiter(s) Oral every 4 hours PRN  apixaban 5 milliGRAM(s) Oral every 12 hours  atorvastatin 20 milliGRAM(s) Oral at bedtime  buDESOnide    Inhalation Suspension 0.5 milliGRAM(s) Inhalation two times a day  chlorhexidine 4% Liquid 1 Application(s) Topical daily  dextrose 5%. 1000 milliLiter(s) IV Continuous <Continuous>  dextrose 5%. 1000 milliLiter(s) IV Continuous <Continuous>  dextrose 50% Injectable 25 Gram(s) IV Push once  dextrose 50% Injectable 12.5 Gram(s) IV Push once  dextrose 50% Injectable 25 Gram(s) IV Push once  dextrose Oral Gel 15 Gram(s) Oral once PRN  diphenhydrAMINE 25 milliGRAM(s) Oral daily PRN  glucagon  Injectable 1 milliGRAM(s) IntraMuscular once  insulin glargine Injectable (LANTUS) 28 Unit(s) SubCutaneous at bedtime  insulin lispro (ADMELOG) corrective regimen sliding scale   SubCutaneous three times a day before meals  insulin lispro (ADMELOG) corrective regimen sliding scale   SubCutaneous at bedtime  insulin lispro Injectable (ADMELOG) 14 Unit(s) SubCutaneous three times a day with meals  linezolid    Tablet 600 milliGRAM(s) Oral every 12 hours  melatonin 3 milliGRAM(s) Oral at bedtime PRN  methylPREDNISolone   Oral   methylPREDNISolone 24 milliGRAM(s) Oral daily  mexiletine 200 milliGRAM(s) Oral every 8 hours  nystatin    Suspension 122130 Unit(s) Swish and Swallow two times a day  ondansetron Injectable 4 milliGRAM(s) IV Push every 8 hours PRN  pantoprazole    Tablet 40 milliGRAM(s) Oral before breakfast  polyethylene glycol 3350 17 Gram(s) Oral two times a day  senna 2 Tablet(s) Oral at bedtime  sodium chloride 7% Inhalation 4 milliLiter(s) Inhalation every 6 hours  tiotropium 2.5 MICROgram(s) Inhaler 2 Puff(s) Inhalation daily  trimethoprim  160 mG/sulfamethoxazole 800 mG 1 Tablet(s) Oral daily    Current Antimicrobials:  linezolid    Tablet 600 milliGRAM(s) Oral every 12 hours  nystatin    Suspension 779794 Unit(s) Swish and Swallow two times a day  trimethoprim  160 mG/sulfamethoxazole 800 mG 1 Tablet(s) Oral daily    Prior/Completed Antimicrobials:  ertapenem  IVPB  ertapenem  IVPB

## 2023-08-24 ENCOUNTER — APPOINTMENT (OUTPATIENT)
Dept: PULMONOLOGY | Facility: CLINIC | Age: 73
End: 2023-08-24

## 2023-08-24 LAB
ALBUMIN SERPL ELPH-MCNC: 3.4 G/DL — SIGNIFICANT CHANGE UP (ref 3.3–5)
ALP SERPL-CCNC: 64 U/L — SIGNIFICANT CHANGE UP (ref 40–120)
ALT FLD-CCNC: 26 U/L — SIGNIFICANT CHANGE UP (ref 10–45)
ANION GAP SERPL CALC-SCNC: 11 MMOL/L — SIGNIFICANT CHANGE UP (ref 5–17)
AST SERPL-CCNC: 52 U/L — HIGH (ref 10–40)
BASOPHILS # BLD AUTO: 0.01 K/UL — SIGNIFICANT CHANGE UP (ref 0–0.2)
BASOPHILS NFR BLD AUTO: 0.1 % — SIGNIFICANT CHANGE UP (ref 0–2)
BILIRUB SERPL-MCNC: 0.8 MG/DL — SIGNIFICANT CHANGE UP (ref 0.2–1.2)
BUN SERPL-MCNC: 22 MG/DL — SIGNIFICANT CHANGE UP (ref 7–23)
CALCIUM SERPL-MCNC: 8.8 MG/DL — SIGNIFICANT CHANGE UP (ref 8.4–10.5)
CHLORIDE SERPL-SCNC: 105 MMOL/L — SIGNIFICANT CHANGE UP (ref 96–108)
CO2 SERPL-SCNC: 22 MMOL/L — SIGNIFICANT CHANGE UP (ref 22–31)
CREAT SERPL-MCNC: 0.73 MG/DL — SIGNIFICANT CHANGE UP (ref 0.5–1.3)
EGFR: 86 ML/MIN/1.73M2 — SIGNIFICANT CHANGE UP
EOSINOPHIL # BLD AUTO: 0.02 K/UL — SIGNIFICANT CHANGE UP (ref 0–0.5)
EOSINOPHIL NFR BLD AUTO: 0.2 % — SIGNIFICANT CHANGE UP (ref 0–6)
FERRITIN SERPL-MCNC: 83 NG/ML — SIGNIFICANT CHANGE UP (ref 13–330)
GLUCOSE BLDC GLUCOMTR-MCNC: 159 MG/DL — HIGH (ref 70–99)
GLUCOSE BLDC GLUCOMTR-MCNC: 180 MG/DL — HIGH (ref 70–99)
GLUCOSE BLDC GLUCOMTR-MCNC: 200 MG/DL — HIGH (ref 70–99)
GLUCOSE BLDC GLUCOMTR-MCNC: 210 MG/DL — HIGH (ref 70–99)
GLUCOSE BLDC GLUCOMTR-MCNC: 231 MG/DL — HIGH (ref 70–99)
GLUCOSE SERPL-MCNC: 209 MG/DL — HIGH (ref 70–99)
HAPTOGLOB SERPL-MCNC: <20 MG/DL — LOW (ref 34–200)
HCT VFR BLD CALC: 25.9 % — LOW (ref 34.5–45)
HGB BLD-MCNC: 7.4 G/DL — LOW (ref 11.5–15.5)
IMM GRANULOCYTES NFR BLD AUTO: 1 % — HIGH (ref 0–0.9)
IRON SATN MFR SERPL: 232 UG/DL — HIGH (ref 30–160)
IRON SATN MFR SERPL: 64 % — HIGH (ref 14–50)
KAPPA LC SER QL IFE: 1.52 MG/DL — SIGNIFICANT CHANGE UP (ref 0.33–1.94)
KAPPA/LAMBDA FREE LIGHT CHAIN RATIO, SERUM: 1.97 RATIO — HIGH (ref 0.26–1.65)
LAMBDA LC SER QL IFE: 0.77 MG/DL — SIGNIFICANT CHANGE UP (ref 0.57–2.63)
LDH SERPL L TO P-CCNC: 1661 U/L — HIGH (ref 50–242)
LYMPHOCYTES # BLD AUTO: 0.33 K/UL — LOW (ref 1–3.3)
LYMPHOCYTES # BLD AUTO: 2.8 % — LOW (ref 13–44)
MAGNESIUM SERPL-MCNC: 2 MG/DL — SIGNIFICANT CHANGE UP (ref 1.6–2.6)
MCHC RBC-ENTMCNC: 22.2 PG — LOW (ref 27–34)
MCHC RBC-ENTMCNC: 28.6 GM/DL — LOW (ref 32–36)
MCV RBC AUTO: 77.8 FL — LOW (ref 80–100)
MONOCYTES # BLD AUTO: 0.6 K/UL — SIGNIFICANT CHANGE UP (ref 0–0.9)
MONOCYTES NFR BLD AUTO: 5 % — SIGNIFICANT CHANGE UP (ref 2–14)
NEUTROPHILS # BLD AUTO: 10.89 K/UL — HIGH (ref 1.8–7.4)
NEUTROPHILS NFR BLD AUTO: 90.9 % — HIGH (ref 43–77)
NRBC # BLD: 4 /100 WBCS — HIGH (ref 0–0)
PHOSPHATE SERPL-MCNC: 3 MG/DL — SIGNIFICANT CHANGE UP (ref 2.5–4.5)
PLATELET # BLD AUTO: 217 K/UL — SIGNIFICANT CHANGE UP (ref 150–400)
POTASSIUM SERPL-MCNC: 4.6 MMOL/L — SIGNIFICANT CHANGE UP (ref 3.5–5.3)
POTASSIUM SERPL-SCNC: 4.6 MMOL/L — SIGNIFICANT CHANGE UP (ref 3.5–5.3)
PROT SERPL-MCNC: 5.5 G/DL — LOW (ref 6–8.3)
RBC # BLD: 3.33 M/UL — LOW (ref 3.8–5.2)
RBC # BLD: 3.33 M/UL — LOW (ref 3.8–5.2)
RBC # FLD: 32.1 % — HIGH (ref 10.3–14.5)
RETICS #: 230.1 K/UL — HIGH (ref 25–125)
RETICS/RBC NFR: 6.9 % — HIGH (ref 0.5–2.5)
SODIUM SERPL-SCNC: 138 MMOL/L — SIGNIFICANT CHANGE UP (ref 135–145)
TIBC SERPL-MCNC: 363 UG/DL — SIGNIFICANT CHANGE UP (ref 220–430)
UIBC SERPL-MCNC: 131 UG/DL — SIGNIFICANT CHANGE UP (ref 110–370)
WBC # BLD: 11.97 K/UL — HIGH (ref 3.8–10.5)
WBC # FLD AUTO: 11.97 K/UL — HIGH (ref 3.8–10.5)

## 2023-08-24 PROCEDURE — 93308 TTE F-UP OR LMTD: CPT | Mod: 26

## 2023-08-24 PROCEDURE — 99232 SBSQ HOSP IP/OBS MODERATE 35: CPT

## 2023-08-24 RX ORDER — INSULIN GLARGINE 100 [IU]/ML
30 INJECTION, SOLUTION SUBCUTANEOUS AT BEDTIME
Refills: 0 | Status: DISCONTINUED | OUTPATIENT
Start: 2023-08-24 | End: 2023-08-25

## 2023-08-24 RX ADMIN — Medication 14 UNIT(S): at 13:34

## 2023-08-24 RX ADMIN — SODIUM CHLORIDE 4 MILLILITER(S): 9 INJECTION INTRAMUSCULAR; INTRAVENOUS; SUBCUTANEOUS at 23:33

## 2023-08-24 RX ADMIN — Medication 0.5 MILLIGRAM(S): at 18:06

## 2023-08-24 RX ADMIN — MEXILETINE HYDROCHLORIDE 200 MILLIGRAM(S): 150 CAPSULE ORAL at 13:32

## 2023-08-24 RX ADMIN — Medication 3 MILLILITER(S): at 12:06

## 2023-08-24 RX ADMIN — APIXABAN 5 MILLIGRAM(S): 2.5 TABLET, FILM COATED ORAL at 05:50

## 2023-08-24 RX ADMIN — Medication 24 MILLIGRAM(S): at 05:47

## 2023-08-24 RX ADMIN — ATORVASTATIN CALCIUM 20 MILLIGRAM(S): 80 TABLET, FILM COATED ORAL at 22:06

## 2023-08-24 RX ADMIN — SODIUM CHLORIDE 4 MILLILITER(S): 9 INJECTION INTRAMUSCULAR; INTRAVENOUS; SUBCUTANEOUS at 18:05

## 2023-08-24 RX ADMIN — Medication 3 MILLILITER(S): at 23:32

## 2023-08-24 RX ADMIN — SODIUM CHLORIDE 4 MILLILITER(S): 9 INJECTION INTRAMUSCULAR; INTRAVENOUS; SUBCUTANEOUS at 05:48

## 2023-08-24 RX ADMIN — LINEZOLID 600 MILLIGRAM(S): 600 INJECTION, SOLUTION INTRAVENOUS at 05:49

## 2023-08-24 RX ADMIN — Medication 0.5 MILLIGRAM(S): at 05:52

## 2023-08-24 RX ADMIN — Medication 2: at 13:34

## 2023-08-24 RX ADMIN — SENNA PLUS 2 TABLET(S): 8.6 TABLET ORAL at 22:06

## 2023-08-24 RX ADMIN — APIXABAN 5 MILLIGRAM(S): 2.5 TABLET, FILM COATED ORAL at 18:08

## 2023-08-24 RX ADMIN — LINEZOLID 600 MILLIGRAM(S): 600 INJECTION, SOLUTION INTRAVENOUS at 18:08

## 2023-08-24 RX ADMIN — Medication 3 MILLILITER(S): at 05:53

## 2023-08-24 RX ADMIN — Medication 500000 UNIT(S): at 18:05

## 2023-08-24 RX ADMIN — SODIUM CHLORIDE 4 MILLILITER(S): 9 INJECTION INTRAMUSCULAR; INTRAVENOUS; SUBCUTANEOUS at 12:07

## 2023-08-24 RX ADMIN — ONDANSETRON 4 MILLIGRAM(S): 8 TABLET, FILM COATED ORAL at 12:00

## 2023-08-24 RX ADMIN — INSULIN GLARGINE 30 UNIT(S): 100 INJECTION, SOLUTION SUBCUTANEOUS at 22:04

## 2023-08-24 RX ADMIN — POLYETHYLENE GLYCOL 3350 17 GRAM(S): 17 POWDER, FOR SOLUTION ORAL at 18:04

## 2023-08-24 RX ADMIN — MEXILETINE HYDROCHLORIDE 200 MILLIGRAM(S): 150 CAPSULE ORAL at 22:05

## 2023-08-24 RX ADMIN — Medication 4: at 09:33

## 2023-08-24 RX ADMIN — Medication 4: at 18:00

## 2023-08-24 RX ADMIN — CHLORHEXIDINE GLUCONATE 1 APPLICATION(S): 213 SOLUTION TOPICAL at 11:59

## 2023-08-24 RX ADMIN — Medication 3 MILLILITER(S): at 18:06

## 2023-08-24 RX ADMIN — POLYETHYLENE GLYCOL 3350 17 GRAM(S): 17 POWDER, FOR SOLUTION ORAL at 05:51

## 2023-08-24 RX ADMIN — Medication 30 MILLILITER(S): at 22:12

## 2023-08-24 RX ADMIN — Medication 14 UNIT(S): at 09:34

## 2023-08-24 RX ADMIN — MEXILETINE HYDROCHLORIDE 200 MILLIGRAM(S): 150 CAPSULE ORAL at 05:46

## 2023-08-24 RX ADMIN — Medication 14 UNIT(S): at 18:03

## 2023-08-24 RX ADMIN — Medication 1 TABLET(S): at 13:32

## 2023-08-24 RX ADMIN — TIOTROPIUM BROMIDE 2 PUFF(S): 18 CAPSULE ORAL; RESPIRATORY (INHALATION) at 12:08

## 2023-08-24 RX ADMIN — Medication 500000 UNIT(S): at 05:45

## 2023-08-24 RX ADMIN — PANTOPRAZOLE SODIUM 40 MILLIGRAM(S): 20 TABLET, DELAYED RELEASE ORAL at 05:51

## 2023-08-24 NOTE — PROGRESS NOTE ADULT - ASSESSMENT
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 P/w SOB, found to have bronchial secretions on CT, admit to medicine for exacerbation of bronchiectasis. Sputum culture growing MRSA, on linezolid. Deconditioning, planning for dc to [place]

## 2023-08-24 NOTE — PROGRESS NOTE ADULT - ATTENDING COMMENTS
This is a 72F with h/o bronchiectasis on chronic steroid, s/p surgery, allergic rhinitis,  recurrent PNA, PND, tracheomalacia s/p tracheoplasty, ESBL proteus infections (sputum), T2DM, paroxysmal A-fib on Eliquis, colorectal cancer many years ago status post colostomy presenting with shortness of breath for 3 days. Prior issues with ESBL/Proteus/yeast in sputum culture and was treated with ertapenem/Benadryl/vancomycin/aztreonam and methylprednisolone. She was discharged home with a midline and completed a course of antibiotics ertapenem (last dose 06/25). Recently she was treated for MRSA and pseudomonas in sputum and completed Tobra nebs and doxycycline.    1. Acute on chronic hypoxic RF due to exacerbation of bronchiectasis (on chronic steroid)  2. Recent tracheo-bronchitis due to ESBL proteus/MRSA/pseudomonas  3. Anemia, multifactorial  4. Paroxysmal A-fib on Eliquis  5. H/o colorectal cancer, status post colostomy    - Feels tired, afebrile, no chest pain, breathing improving- less cough, O2 97-99% on ambulation off O2.  - repeat sputum c/s grew MRSA, now on PO Zyvox for 7 days  - Heme consult called yesterday as Hb is 7.2 ( im May was 12), FOBT neg, Iron studies and hemolysis, MM w/u in progress  - Pulmonary plans noted- on bronchodilators, PO prednisone taper, inhaled steroid, s/p IV Ertapenem and Hypertonic saline to 7% for airway clearance device (patient brought her own from home) and chest PT as tolerated   - CT chest shows unchanged infectious/inflammatory small airways disease in the right middle/lower lobes with multifocal small tree-in-bud nodules. Status post ascending aortic and aortic valve replacement, with residual dissection flap better seen on the prior study. Stable indeterminate small left renal nodule and numerous pancreatic cysts.  - LDH 1382, Fungitell neg, already on Bactrim ppx  - Echo bubble study no shunt, repeat echo ordered to see transvalvular gradient post TAVR  - c/w Eliquis,   - Endocrine f/u plans noted- on basal/bolus insulins FS elevated due to steroid  - Her outpatient Pul- Dr Allison is aware.  ** spoke to her Dtr- Zoe, not a candidate for [facility] as she used up all rehab days, d/c planning w home PT once stable

## 2023-08-24 NOTE — PROGRESS NOTE ADULT - SUBJECTIVE AND OBJECTIVE BOX
Vamshi earlier today     Chief Complaint: Diabetes Mellitus follow up    INTERVAL HX: Tolerating POs with increased  oral intake. Expressed frustration with difficulties in getting food she wants. BGs variable ( 100s-200s) without pattern. As per pt  she eats snack at night but denies eating snacks between meals during the daytime. On Methylprednisolone 24 mg daily till 8/28 then dose taper ( 16 mg X 7 days on 8/29, then decrease to 8 mg X 7days on 9/5)      Review of Systems:  General: As above  GI: No nausea, vomiting  Endocrine: no  S&Sx of hypoglycemia    Allergies    tetanus toxoid (Short breath)  cefepime (Anaphylaxis)  penicillin (Anaphylaxis)  Avelox (Short breath; Pruritus)  Dilaudid (Short breath)  codeine (Short breath)  aspirin (Short breath)  iodine (Short breath; Swelling)  Valium (Short breath)  shellfish (Anaphylaxis)    Intolerances      MEDICATIONS  (STANDING):  atorvastatin 20 milliGRAM(s) Oral at bedtime  buDESOnide    Inhalation Suspension 0.5 milliGRAM(s) Inhalation two times a day  chlorhexidine 4% Liquid 1 Application(s) Topical daily  dextrose 5%. 1000 milliLiter(s) (50 mL/Hr) IV Continuous <Continuous>  dextrose 5%. 1000 milliLiter(s) (100 mL/Hr) IV Continuous <Continuous>  dextrose 50% Injectable 25 Gram(s) IV Push once  dextrose 50% Injectable 12.5 Gram(s) IV Push once  dextrose 50% Injectable 25 Gram(s) IV Push once  glucagon  Injectable 1 milliGRAM(s) IntraMuscular once  insulin glargine Injectable (LANTUS) 28 Unit(s) SubCutaneous at bedtime  insulin lispro (ADMELOG) corrective regimen sliding scale   SubCutaneous at bedtime  insulin lispro (ADMELOG) corrective regimen sliding scale   SubCutaneous three times a day before meals  insulin lispro Injectable (ADMELOG) 14 Unit(s) SubCutaneous three times a day with meals  methylPREDNISolone 24 milliGRAM(s) Oral daily  methylPREDNISolone   Oral   mexiletine 200 milliGRAM(s) Oral every 8 hours  nystatin    Suspension 901575 Unit(s) Swish and Swallow two times a day  pantoprazole    Tablet 40 milliGRAM(s) Oral before breakfast  polyethylene glycol 3350 17 Gram(s) Oral two times a day  senna 2 Tablet(s) Oral at bedtime  sodium chloride 7% Inhalation 4 milliLiter(s) Inhalation every 6 hours  tiotropium 2.5 MICROgram(s) Inhaler 2 Puff(s) Inhalation daily  trimethoprim  160 mG/sulfamethoxazole 800 mG 1 Tablet(s) Oral daily      atorvastatin   20 milliGRAM(s) Oral (08-23-23 @ 22:04)    insulin glargine Injectable (LANTUS)   28 Unit(s) SubCutaneous (08-23-23 @ 22:03)    insulin lispro (ADMELOG) corrective regimen sliding scale   4 Unit(s) SubCutaneous (08-24-23 @ 18:00)   2 Unit(s) SubCutaneous (08-24-23 @ 13:34)   4 Unit(s) SubCutaneous (08-24-23 @ 09:33)    insulin lispro Injectable (ADMELOG)   14 Unit(s) SubCutaneous (08-24-23 @ 18:03)   14 Unit(s) SubCutaneous (08-24-23 @ 13:34)   14 Unit(s) SubCutaneous (08-24-23 @ 09:34)    methylPREDNISolone   24 milliGRAM(s) Oral (08-24-23 @ 05:47)        PHYSICAL EXAM:  VITALS: T(C): 36.7 (08-24-23 @ 11:58)  T(F): 98 (08-24-23 @ 11:58), Max: 98.3 (08-23-23 @ 20:28)  HR: 82 (08-24-23 @ 11:58) (82 - 101)  BP: 115/75 (08-24-23 @ 11:58) (115/75 - 166/61)  RR:  (18 - 20)  SpO2:  (95% - 100%)  Wt(kg): --  GENERAL: in NAD  Respiratory: Respirations unlabored   Extremities: Warm, no edema  NEURO: Alert , appropriate     LABS:  POCT Blood Glucose.: 210 mg/dL (08-24-23 @ 17:25)  POCT Blood Glucose.: 159 mg/dL (08-24-23 @ 12:53)  POCT Blood Glucose.: 231 mg/dL (08-24-23 @ 09:09)  POCT Blood Glucose.: 180 mg/dL (08-24-23 @ 01:49)  POCT Blood Glucose.: 158 mg/dL (08-23-23 @ 21:13)  POCT Blood Glucose.: 172 mg/dL (08-23-23 @ 17:15)  POCT Blood Glucose.: 125 mg/dL (08-23-23 @ 10:51)  POCT Blood Glucose.: 175 mg/dL (08-23-23 @ 01:46)  POCT Blood Glucose.: 296 mg/dL (08-22-23 @ 21:14)  POCT Blood Glucose.: 167 mg/dL (08-22-23 @ 17:47)  POCT Blood Glucose.: 342 mg/dL (08-22-23 @ 14:40)  POCT Blood Glucose.: 421 mg/dL (08-22-23 @ 12:57)  POCT Blood Glucose.: 137 mg/dL (08-22-23 @ 08:43)  POCT Blood Glucose.: 137 mg/dL (08-22-23 @ 02:00)  POCT Blood Glucose.: 205 mg/dL (08-21-23 @ 21:58)                          7.4    11.97 )-----------( 217      ( 24 Aug 2023 12:04 )             25.9     08-24    138  |  105  |  22  ----------------------------<  209<H>  4.6   |  22  |  0.73    Ca    8.8      24 Aug 2023 12:04  Phos  3.0     08-24  Mg     2.0     08-24    TPro  5.5<L>  /  Alb  3.4  /  TBili  0.8  /  DBili  x   /  AST  52<H>  /  ALT  26  /  AlkPhos  64  08-24    eGFR: 86 mL/min/1.73m2 (24 Aug 2023 12:04)      Thyroid Function Tests:      A1C with Estimated Average Glucose Result: 9.1 % (06-17-23 @ 10:27)    Estimated Average Glucose: 214 mg/dL (06-17-23 @ 10:27)        Diet, Regular:   Consistent Carbohydrate Evening Snack (CSTCHOSN)  Supplement Feeding Modality:  Oral  Glucerna Shake Cans or Servings Per Day:  3       Frequency:  Three Times a day (08-18-23 @ 11:56) [Active]

## 2023-08-24 NOTE — PROGRESS NOTE ADULT - PROBLEM SELECTOR PLAN 1
Test BG ac and hs and 2am while on steroids  Increase Lantus to 30 units qhs for now  C/w Admelog 14 units qac for now.  Will follow  C/w Mod correction scale qac + bedtime  Please contact endo team with any changes on steroid doses   Discharge:  Will be determined according to BG/insulin needs/PO intake and steroid therapy at time of discharge.  Plan to d/c on basal bolus. Doses TBD  Outpt. endo follow-up. Dr Saavedra  Outpt. optho, podiatry, micro/cr  Make sure pt has Rx for all DM supplies and insulin/ DM meds. Consider Vivebio Luis E 3 if going home

## 2023-08-24 NOTE — PROGRESS NOTE ADULT - ASSESSMENT
73 yo PMHx  asthma on chronic steroids, bronchiectasis s/p surgery, allergic rhinitis,  recurrent PNA, PND, tracheomalacia s/p tracheoplasty, ESBL proteus infections (sputum),  diabetes, paroxysmal A-fib on Eliquis, colorectal cancer many years ago status post colostomy presenting with concern for shortness of breath. Prior issues with ESBL/Proteus/yeast in sputum culture and was treated with ertapenem/Benadryl/vancomycin/aztreonam and methylprednisolone.   She was discharged home with a midline and completed a course of antibiotics ertapenem (last dose 06/25) PT now readmitted with severe dyspnea and reports that having had asthma for 61 years she feels like she needs an antibiotic. Reports not tolerating meropenem. Patient follows with Dr Allison who knows this complicated pt very well  Noted  pt has PCN, cefepime allergies    RECOMMENDATIONS  -no evidence of airspace disease so suspect likely Bronchiectasis exacerbation  -recent sputums with ESBL proteus so having tolerated ertapenem and was restarted   -Repeat CXR with no pneumonia   -Fungitell negative     -8/15 Sputum culture with normal resp val   -8/14 Sputum AFB smear negative -f/u culture to rule out DIEUDONNE  -8/17 CF sputum culture with mod Staph aureus-MRSA, Pantoea (previously Enterobacter) and normal resp val   - sensitivities noted; recently completed course of ertapenem 8/12-8/18, now on linezolid     -leukocytosis trend noted -overall stable - suspect likely reactive in setting of steroids   - no new complaints or focal findings on exam; remains afebrile    Pulmonary following - changed to po steroid 8/19  Follow AFB, fungal sputum cultures   Continue linezolid day 7 of 7 -- end today  S/p ertapenem (8/12-8/18)  Supportive care, chest PT, neb treatments   Monitor temps/CBC      Tello Fernandez M.D.  [facility], Division of Infectious Diseases  [phone]  After 5pm on weekdays and all day on weekends - please call [phone]

## 2023-08-24 NOTE — PROGRESS NOTE ADULT - ASSESSMENT
72 F w/h/o uncontrolled T2DM (A1C 9.4%) on basal/bolus insulin PTA. Unknown DM complications. Also COPD, secondary adrenal Insufficiency on chronic steroids, colorectal cancer s/p resection (colostomy bag), Chronic A fib on Eliquis, and tracheomalacia and multiple intubations, type A aortic dissection s/p aortic dissection repair on 9/07/22, sepsis. Pt well known to this provider from prior admissions. Here with SOB> treated for PNA and on steroid pulse coming down to Prednisolone to 24mg tomorrow. Endocrine consulted for assistance with uncontrolled DM/steroids for AI on steroid pulse for SOB with BG  improving while on steroid taper. BGs variable ( 1001-200s) without pattern likely due to inconsistent oral intake and timing of meals. BG Goal 100-180mg/dl. Will increase Lantus dose for fasting BG above goal for 2 days (  in BMP yesterday and 231 today)    Pt reports she wears Freestyle Luis E 14 at home but has been in and out of hospital since may. Will start Freestyle Luis E 3 if pt going home. Needs keshia on phone but pt states she wants daughter to do it.

## 2023-08-24 NOTE — PROGRESS NOTE ADULT - PROBLEM SELECTOR PLAN 4
On chronic steroids at home > medrol 8mg qd from admission in 2022  Per pt she has been sick this year with multiple hospitalizations requiring pulse steroids. Was on Methylprednisolone 28mg PTA.   -Will need slow titration back to Medrol 8mg qd   - Will need stress steroids if acutely ill. Has been on higher dose this year due to SOB exacerbations/hospitalizations.      Contact via [employer] Teams during business hours  To reach covering provider access AMION via sunrise tools  For Urgent matters/after-hours/weekends/holidays please page endocrine fellow on call   For nonurgent matters please email [email or web address]

## 2023-08-24 NOTE — PROGRESS NOTE ADULT - SUBJECTIVE AND OBJECTIVE BOX
Lázaro Molina, PGY-1  Please contact on Teams    ÓSCARWILBERTORAYRAY CHERY  72y  Female    Reason for Admission:       SUBJECTIVE/INTERVAL EVENTS: No acute events. Pt seen and examined at bedside.    PHYSICAL EXAM:  GENERAL: NAD, lying comfortably in bed   HEAD:  Atraumatic, Normocephalic  EYES: EOMI b/l, PERRLA b/l, conjunctiva and sclera clear  NECK: Supple, No JVD, No LAD   CHEST/LUNG: coarse breath sounds, similar to prior  HEART: Regular rate and rhythm; S1 and S2 present  ABDOMEN: Soft, Nontender, Nondistended; Bowel sounds present  EXTREMITIES:  No clubbing, cyanosis, or edema  NEURO: AAOx3, non-focal   SKIN: No rashes       Vital Signs Last 24 Hrs  T(C): 36.8 (23 Aug 2023 20:28), Max: 36.8 (23 Aug 2023 20:28)  T(F): 98.3 (23 Aug 2023 20:28), Max: 98.3 (23 Aug 2023 20:28)  HR: 101 (23 Aug 2023 20:28) (78 - 101)  BP: 166/61 (23 Aug 2023 20:28) (100/63 - 166/61)  BP(mean): --  RR: 18 (23 Aug 2023 20:28) (18 - 18)  SpO2: 95% (23 Aug 2023 20:28) (95% - 100%)    Parameters below as of 23 Aug 2023 20:28  Patient On (Oxygen Delivery Method): room air        Consultant(s) Notes Reviewed:  [x] YES  [ ] NO  Care Discussed with Consultants/Other Providers [x] YES  [ ] NO    LABS:                        7.2    13.41 )-----------( 226      ( 23 Aug 2023 07:16 )             24.9                         7.3    11.69 )-----------( 207      ( 22 Aug 2023 06:39 )             25.4                         8.0    12.71 )-----------( 224      ( 21 Aug 2023 16:38 )             27.9         Urinalysis Basic - ( 23 Aug 2023 07:12 )    Color: x / Appearance: x / SG: x / pH: x  Gluc: 164 mg/dL / Ketone: x  / Bili: x / Urobili: x   Blood: x / Protein: x / Nitrite: x   Leuk Esterase: x / RBC: x / WBC x   Sq Epi: x / Non Sq Epi: x / Bacteria: x        RADIOLOGY & ADDITIONAL TESTS:    Imaging Personally Reviewed:  [x] YES  [ ] NO    MEDICATIONS  (STANDING):  albuterol/ipratropium for Nebulization 3 milliLiter(s) Nebulizer every 6 hours  apixaban 5 milliGRAM(s) Oral every 12 hours  atorvastatin 20 milliGRAM(s) Oral at bedtime  buDESOnide    Inhalation Suspension 0.5 milliGRAM(s) Inhalation two times a day  chlorhexidine 4% Liquid 1 Application(s) Topical daily  dextrose 5%. 1000 milliLiter(s) (100 mL/Hr) IV Continuous <Continuous>  dextrose 5%. 1000 milliLiter(s) (50 mL/Hr) IV Continuous <Continuous>  dextrose 50% Injectable 12.5 Gram(s) IV Push once  dextrose 50% Injectable 25 Gram(s) IV Push once  dextrose 50% Injectable 25 Gram(s) IV Push once  glucagon  Injectable 1 milliGRAM(s) IntraMuscular once  insulin glargine Injectable (LANTUS) 28 Unit(s) SubCutaneous at bedtime  insulin lispro (ADMELOG) corrective regimen sliding scale   SubCutaneous three times a day before meals  insulin lispro (ADMELOG) corrective regimen sliding scale   SubCutaneous at bedtime  insulin lispro Injectable (ADMELOG) 14 Unit(s) SubCutaneous three times a day with meals  linezolid    Tablet 600 milliGRAM(s) Oral every 12 hours  methylPREDNISolone   Oral   methylPREDNISolone 24 milliGRAM(s) Oral daily  mexiletine 200 milliGRAM(s) Oral every 8 hours  nystatin    Suspension 303667 Unit(s) Swish and Swallow two times a day  pantoprazole    Tablet 40 milliGRAM(s) Oral before breakfast  polyethylene glycol 3350 17 Gram(s) Oral two times a day  senna 2 Tablet(s) Oral at bedtime  sodium chloride 7% Inhalation 4 milliLiter(s) Inhalation every 6 hours  tiotropium 2.5 MICROgram(s) Inhaler 2 Puff(s) Inhalation daily  trimethoprim  160 mG/sulfamethoxazole 800 mG 1 Tablet(s) Oral daily    MEDICATIONS  (PRN):  acetaminophen     Tablet .. 650 milliGRAM(s) Oral every 6 hours PRN Temp greater or equal to 38C (100.4F), Mild Pain (1 - 3)  aluminum hydroxide/magnesium hydroxide/simethicone Suspension 30 milliLiter(s) Oral every 4 hours PRN Dyspepsia  dextrose Oral Gel 15 Gram(s) Oral once PRN Blood Glucose LESS THAN 70 milliGRAM(s)/deciliter  diphenhydrAMINE 25 milliGRAM(s) Oral daily PRN Allergy symptoms  melatonin 3 milliGRAM(s) Oral at bedtime PRN Insomnia  ondansetron Injectable 4 milliGRAM(s) IV Push every 8 hours PRN Nausea and/or Vomiting      HEALTH ISSUES - PROBLEM Dx:  Acute exacerbation of bronchiectasis    Acute asthma exacerbation    SIRS without infection or organ dysfunction    Atrial fibrillation  paroxysmal, on eliquis    Diabetes mellitus    Prophylactic measure    Uncontrolled type 2 diabetes mellitus with hyperglycemia    Essential hypertension    Hyperlipidemia    H/O adrenal insufficiency

## 2023-08-25 ENCOUNTER — APPOINTMENT (OUTPATIENT)
Dept: HEMATOLOGY ONCOLOGY | Facility: CLINIC | Age: 73
End: 2023-08-25

## 2023-08-25 ENCOUNTER — TRANSCRIPTION ENCOUNTER (OUTPATIENT)
Age: 73
End: 2023-08-25

## 2023-08-25 DIAGNOSIS — D64.9 ANEMIA, UNSPECIFIED: ICD-10-CM

## 2023-08-25 DIAGNOSIS — I35.0 NONRHEUMATIC AORTIC (VALVE) STENOSIS: ICD-10-CM

## 2023-08-25 LAB
ALBUMIN SERPL ELPH-MCNC: 3.4 G/DL — SIGNIFICANT CHANGE UP (ref 3.3–5)
ALP SERPL-CCNC: 65 U/L — SIGNIFICANT CHANGE UP (ref 40–120)
ALT FLD-CCNC: 25 U/L — SIGNIFICANT CHANGE UP (ref 10–45)
ANION GAP SERPL CALC-SCNC: 13 MMOL/L — SIGNIFICANT CHANGE UP (ref 5–17)
AST SERPL-CCNC: 52 U/L — HIGH (ref 10–40)
BASOPHILS # BLD AUTO: 0.01 K/UL — SIGNIFICANT CHANGE UP (ref 0–0.2)
BASOPHILS NFR BLD AUTO: 0.1 % — SIGNIFICANT CHANGE UP (ref 0–2)
BILIRUB SERPL-MCNC: 0.8 MG/DL — SIGNIFICANT CHANGE UP (ref 0.2–1.2)
BUN SERPL-MCNC: 23 MG/DL — SIGNIFICANT CHANGE UP (ref 7–23)
CALCIUM SERPL-MCNC: 8.7 MG/DL — SIGNIFICANT CHANGE UP (ref 8.4–10.5)
CHLORIDE SERPL-SCNC: 103 MMOL/L — SIGNIFICANT CHANGE UP (ref 96–108)
CO2 SERPL-SCNC: 23 MMOL/L — SIGNIFICANT CHANGE UP (ref 22–31)
CREAT SERPL-MCNC: 0.64 MG/DL — SIGNIFICANT CHANGE UP (ref 0.5–1.3)
DAT POLY-SP REAG RBC QL: NEGATIVE — SIGNIFICANT CHANGE UP
EGFR: 93 ML/MIN/1.73M2 — SIGNIFICANT CHANGE UP
EOSINOPHIL # BLD AUTO: 0.01 K/UL — SIGNIFICANT CHANGE UP (ref 0–0.5)
EOSINOPHIL NFR BLD AUTO: 0.1 % — SIGNIFICANT CHANGE UP (ref 0–6)
GLUCOSE BLDC GLUCOMTR-MCNC: 112 MG/DL — HIGH (ref 70–99)
GLUCOSE BLDC GLUCOMTR-MCNC: 119 MG/DL — HIGH (ref 70–99)
GLUCOSE BLDC GLUCOMTR-MCNC: 216 MG/DL — HIGH (ref 70–99)
GLUCOSE BLDC GLUCOMTR-MCNC: 231 MG/DL — HIGH (ref 70–99)
GLUCOSE BLDC GLUCOMTR-MCNC: 284 MG/DL — HIGH (ref 70–99)
GLUCOSE SERPL-MCNC: 269 MG/DL — HIGH (ref 70–99)
HCT VFR BLD CALC: 24 % — LOW (ref 34.5–45)
HGB BLD-MCNC: 6.9 G/DL — CRITICAL LOW (ref 11.5–15.5)
IMM GRANULOCYTES NFR BLD AUTO: 1.3 % — HIGH (ref 0–0.9)
LYMPHOCYTES # BLD AUTO: 0.24 K/UL — LOW (ref 1–3.3)
LYMPHOCYTES # BLD AUTO: 1.9 % — LOW (ref 13–44)
MAGNESIUM SERPL-MCNC: 1.9 MG/DL — SIGNIFICANT CHANGE UP (ref 1.6–2.6)
MCHC RBC-ENTMCNC: 22.5 PG — LOW (ref 27–34)
MCHC RBC-ENTMCNC: 28.8 GM/DL — LOW (ref 32–36)
MCV RBC AUTO: 78.2 FL — LOW (ref 80–100)
MONOCYTES # BLD AUTO: 0.53 K/UL — SIGNIFICANT CHANGE UP (ref 0–0.9)
MONOCYTES NFR BLD AUTO: 4.1 % — SIGNIFICANT CHANGE UP (ref 2–14)
NEUTROPHILS # BLD AUTO: 12 K/UL — HIGH (ref 1.8–7.4)
NEUTROPHILS NFR BLD AUTO: 92.5 % — HIGH (ref 43–77)
NRBC # BLD: 6 /100 WBCS — HIGH (ref 0–0)
PHOSPHATE SERPL-MCNC: 2.5 MG/DL — SIGNIFICANT CHANGE UP (ref 2.5–4.5)
PLATELET # BLD AUTO: 216 K/UL — SIGNIFICANT CHANGE UP (ref 150–400)
POTASSIUM SERPL-MCNC: 4.8 MMOL/L — SIGNIFICANT CHANGE UP (ref 3.5–5.3)
POTASSIUM SERPL-SCNC: 4.8 MMOL/L — SIGNIFICANT CHANGE UP (ref 3.5–5.3)
PROT SERPL-MCNC: 5.5 G/DL — LOW (ref 6–8.3)
RBC # BLD: 3.07 M/UL — LOW (ref 3.8–5.2)
RBC # FLD: 31.8 % — HIGH (ref 10.3–14.5)
SODIUM SERPL-SCNC: 139 MMOL/L — SIGNIFICANT CHANGE UP (ref 135–145)
WBC # BLD: 12.96 K/UL — HIGH (ref 3.8–10.5)
WBC # FLD AUTO: 12.96 K/UL — HIGH (ref 3.8–10.5)

## 2023-08-25 PROCEDURE — 88291 CYTO/MOLECULAR REPORT: CPT | Mod: 59

## 2023-08-25 PROCEDURE — 88189 FLOWCYTOMETRY/READ 16 & >: CPT

## 2023-08-25 PROCEDURE — G0452: CPT | Mod: 26

## 2023-08-25 PROCEDURE — 99232 SBSQ HOSP IP/OBS MODERATE 35: CPT

## 2023-08-25 PROCEDURE — 99222 1ST HOSP IP/OBS MODERATE 55: CPT

## 2023-08-25 PROCEDURE — 99223 1ST HOSP IP/OBS HIGH 75: CPT

## 2023-08-25 RX ORDER — INSULIN GLARGINE 100 [IU]/ML
32 INJECTION, SOLUTION SUBCUTANEOUS AT BEDTIME
Refills: 0 | Status: DISCONTINUED | OUTPATIENT
Start: 2023-08-25 | End: 2023-08-26

## 2023-08-25 RX ORDER — INSULIN LISPRO 100/ML
16 VIAL (ML) SUBCUTANEOUS
Refills: 0 | Status: DISCONTINUED | OUTPATIENT
Start: 2023-08-25 | End: 2023-08-26

## 2023-08-25 RX ORDER — FOLIC ACID 0.8 MG
1 TABLET ORAL DAILY
Refills: 0 | Status: DISCONTINUED | OUTPATIENT
Start: 2023-08-25 | End: 2023-09-06

## 2023-08-25 RX ADMIN — POLYETHYLENE GLYCOL 3350 17 GRAM(S): 17 POWDER, FOR SOLUTION ORAL at 17:31

## 2023-08-25 RX ADMIN — Medication 14 UNIT(S): at 09:04

## 2023-08-25 RX ADMIN — CHLORHEXIDINE GLUCONATE 1 APPLICATION(S): 213 SOLUTION TOPICAL at 13:41

## 2023-08-25 RX ADMIN — APIXABAN 5 MILLIGRAM(S): 2.5 TABLET, FILM COATED ORAL at 17:30

## 2023-08-25 RX ADMIN — Medication 3 MILLILITER(S): at 13:39

## 2023-08-25 RX ADMIN — MEXILETINE HYDROCHLORIDE 200 MILLIGRAM(S): 150 CAPSULE ORAL at 13:39

## 2023-08-25 RX ADMIN — Medication 500000 UNIT(S): at 05:22

## 2023-08-25 RX ADMIN — Medication 3 MILLILITER(S): at 23:14

## 2023-08-25 RX ADMIN — TIOTROPIUM BROMIDE 2 PUFF(S): 18 CAPSULE ORAL; RESPIRATORY (INHALATION) at 13:39

## 2023-08-25 RX ADMIN — Medication 30 MILLILITER(S): at 18:46

## 2023-08-25 RX ADMIN — Medication 16 UNIT(S): at 13:38

## 2023-08-25 RX ADMIN — Medication 0.5 MILLIGRAM(S): at 05:21

## 2023-08-25 RX ADMIN — Medication 3 MILLILITER(S): at 05:21

## 2023-08-25 RX ADMIN — SODIUM CHLORIDE 4 MILLILITER(S): 9 INJECTION INTRAMUSCULAR; INTRAVENOUS; SUBCUTANEOUS at 05:20

## 2023-08-25 RX ADMIN — Medication 1 MILLIGRAM(S): at 13:40

## 2023-08-25 RX ADMIN — Medication 3 MILLILITER(S): at 17:30

## 2023-08-25 RX ADMIN — SODIUM CHLORIDE 4 MILLILITER(S): 9 INJECTION INTRAMUSCULAR; INTRAVENOUS; SUBCUTANEOUS at 13:40

## 2023-08-25 RX ADMIN — INSULIN GLARGINE 32 UNIT(S): 100 INJECTION, SOLUTION SUBCUTANEOUS at 21:50

## 2023-08-25 RX ADMIN — POLYETHYLENE GLYCOL 3350 17 GRAM(S): 17 POWDER, FOR SOLUTION ORAL at 05:21

## 2023-08-25 RX ADMIN — SODIUM CHLORIDE 4 MILLILITER(S): 9 INJECTION INTRAMUSCULAR; INTRAVENOUS; SUBCUTANEOUS at 23:14

## 2023-08-25 RX ADMIN — MEXILETINE HYDROCHLORIDE 200 MILLIGRAM(S): 150 CAPSULE ORAL at 21:50

## 2023-08-25 RX ADMIN — Medication 16 UNIT(S): at 18:13

## 2023-08-25 RX ADMIN — Medication 0.5 MILLIGRAM(S): at 17:31

## 2023-08-25 RX ADMIN — SODIUM CHLORIDE 4 MILLILITER(S): 9 INJECTION INTRAMUSCULAR; INTRAVENOUS; SUBCUTANEOUS at 17:30

## 2023-08-25 RX ADMIN — Medication 6: at 09:03

## 2023-08-25 RX ADMIN — Medication 1 TABLET(S): at 13:40

## 2023-08-25 RX ADMIN — Medication 4: at 18:12

## 2023-08-25 RX ADMIN — ATORVASTATIN CALCIUM 20 MILLIGRAM(S): 80 TABLET, FILM COATED ORAL at 21:50

## 2023-08-25 RX ADMIN — Medication 500000 UNIT(S): at 17:30

## 2023-08-25 RX ADMIN — Medication 24 MILLIGRAM(S): at 05:21

## 2023-08-25 RX ADMIN — PANTOPRAZOLE SODIUM 40 MILLIGRAM(S): 20 TABLET, DELAYED RELEASE ORAL at 05:22

## 2023-08-25 RX ADMIN — SENNA PLUS 2 TABLET(S): 8.6 TABLET ORAL at 21:50

## 2023-08-25 RX ADMIN — MEXILETINE HYDROCHLORIDE 200 MILLIGRAM(S): 150 CAPSULE ORAL at 05:21

## 2023-08-25 RX ADMIN — APIXABAN 5 MILLIGRAM(S): 2.5 TABLET, FILM COATED ORAL at 05:22

## 2023-08-25 NOTE — DISCHARGE NOTE PROVIDER - NSDCACTIVITY_GEN_ALL_CORE
No heavy lifting/straining Sex allowed/Showering allowed/Stairs allowed/Walking - Indoors allowed/No heavy lifting/straining/Walking - Outdoors allowed/Follow Instructions Provided by your Surgical Team

## 2023-08-25 NOTE — DISCHARGE NOTE PROVIDER - NSDCMRMEDTOKEN_GEN_ALL_CORE_FT
Albuterol (Eqv-Ventolin HFA) 90 mcg/inh inhalation aerosol: 2 puff(s) inhaled 3 times a day as needed for  shortness of breath and/or wheezing after neb  albuterol 2.5 mg/3 mL (0.083%) inhalation solution: 2.5 milligram(s) by nebulizer every 6 hours as needed for  shortness of breath and/or wheezing  alcohol swabs: Apply topically to affected area 4 times a day  apixaban 5 mg oral tablet: 1 tab(s) orally 2 times a day  Bactrim  mg-160 mg oral tablet: 1 tab(s) orally once a day  Basaglar KwikPen 100 units/mL subcutaneous solution: 45 unit(s) subcutaneous once a day (at bedtime)  Breo Ellipta 200 mcg-25 mcg/inh inhalation powder: 1 puff(s) inhaled once a day  budesonide 0.5 mg/2 mL inhalation suspension: 2 milliliter(s) inhaled 2 times a day  Crestor 5 mg oral tablet: 1 tab(s) orally once a day (at bedtime)  diphenhydrAMINE 50 mg oral capsule: 1 cap(s) orally every 6 hours as needed for Rash and/or Itching  glucometer (per patient&#x27;s insurance): Test blood sugars four times a day. Dispense #1 glucometer.  guaiFENesin 600 mg oral tablet, extended release: 1 tab(s) orally every 12 hours  HumaLOG KwikPen 100 units/mL injectable solution: 5 unit(s) subcutaneously 3 times a day (before meals) Inject as per sliding scale: 100-150 = 5 units, 151-200 = 6 units, 201-250 = 7 units, 251-300 = 8 units, 301-350 = 9 units, 351-400 = 10 units, &gt; 400 = call md  Incruse Ellipta 62.5 mcg/inh inhalation powder: 1 puff(s) inhaled every 24 hours  Insulin Pen Needles, 4mm: 1 application subcutaneously 4 times a day. ** Use with insulin pen **  lancets: 1 application subcutaneously 4 times a day  Medrol 4 mg oral tablet: 6 tab(s) orally once a day Medrol Taper: Start with 24mg once a day x4 days, then decrease by 4mg every 4 days, then take 4mg daily until told to stop by your pulmonologist  mexiletine 200 mg oral capsule: 1 cap(s) orally every 8 hours  ondansetron 4 mg oral tablet: 1 tab(s) orally every 6 hours as needed for  nausea  test strips (per patient&#x27;s insurance): 1 application subcutaneously 4 times a day. ** Compatible with patient&#x27;s glucometer **   Albuterol (Eqv-Ventolin HFA) 90 mcg/inh inhalation aerosol: 2 puff(s) inhaled 3 times a day as needed for  shortness of breath and/or wheezing after neb  albuterol 2.5 mg/3 mL (0.083%) inhalation solution: 2.5 milligram(s) by nebulizer every 6 hours as needed for  shortness of breath and/or wheezing  Basaglar KwikPen 100 units/mL subcutaneous solution: 35 unit(s) subcutaneous once a day (at bedtime)  Breo Ellipta 200 mcg-25 mcg/inh inhalation powder: 1 puff(s) inhaled once a day  clopidogrel 75 mg oral tablet: 1 tab(s) orally once a day  Crestor 5 mg oral tablet: 1 tab(s) orally once a day (at bedtime)  famotidine 20 mg oral tablet: 1 tab(s) orally 2 times a day  guaiFENesin 600 mg oral tablet, extended release: 1 tab(s) orally every 12 hours  HumaLOG KwikPen 100 units/mL injectable solution: 18 unit(s) subcutaneously twice a day before breakfast and dinner Inject as per sliding scale: 100-150 = 5 units, 151-200 = 6 units, 201-250 = 7 units, 251-300 = 8 units, 301-350 = 9 units, 351-400 = 10 units, &gt; 400 = call md  HumaLOG KwikPen 100 units/mL injectable solution: 16 unit(s) subcutaneous once a day before lunch unit(s) subcutaneous 3 times a day (with meals)  HumaLOG KwikPen 100 units/mL injectable solution: 5 unit(s) subcutaneous once a day (at bedtime) unit(s) subcutaneous 3 times a day (with meals)  Incruse Ellipta 62.5 mcg/inh inhalation powder: 1 puff(s) inhaled every 24 hours  methylPREDNISolone 16 mg oral tablet: 1 tab(s) orally once a day  mexiletine 200 mg oral capsule: 1 cap(s) orally every 8 hours  senna leaf extract oral tablet: 2 tab(s) orally once a day (at bedtime)  simethicone 80 mg oral tablet, chewable: 1 tab(s) orally every 12 hours as needed for Gas

## 2023-08-25 NOTE — CONSULT NOTE ADULT - SUBJECTIVE AND OBJECTIVE BOX
Hematology/Oncology Consult Note    HPI:  72 year old woman PMHx  asthma on chronic steroids, bronchiectasis s/p surgery, allergic rhinitis,  recurrent PNA, PND, tracheomalacia s/p tracheoplasty, ESBL proteus infections (sputum),  diabetes, paroxysmal A-fib on Eliquis, colorectal cancer many years ago status post colostomy presenting with concern for shortness of breath. It started 2 and half weeks ago with dysnea on exertion, increased sputum production and running nose. She also endorsed sweatiness, chill, abd pain, denied fever, n/v/c/d, sick contact. she is up to date with vaccines. she has had chronic shortness of breath and cough for years. SHe had a recent hospitalization at an outside hospital  for acute respiratory failure with hypoxia secondary to asthma/COPD exacerbation and bronchopneumonia 6/5/2023 - 6/16/2023), She was found to have ESBL/Proteus/yeast in sputum culture and was treated with ertapenem/Benadryl/vancomycin/aztreonam and methylprednisolone.   She was discharged home with a midline and completed a course of antibiotics ertapenem (last dose 06/25).Dr. Allison also prescribed tobramycin after discharge,  and her metylprednisolone dose was reduced from 32 to 28 mg po daily from last monday to this monday. She states since dose reduction she has had worsened dyspnea. when she called Dr. Allison's office, he urged the patient to come to the ED for further management.     Of note had chronic dyspnea/severe asthma since childhood. She states she had a severe sinus infection requiring surgery at ?12 yo and since then has had difficulties with breathing. She has been on chronic steroids for over 20 years per report.. She uses Breo, Spiriva, albuterol neb and a cough assist device.     In the ED, BP stable 121/65, RR 22, saturating at RA at 100%, 97.5 temp. CBC has 10.56 HGB 10, platelet 227. chemistry normal except glucose 272. procal 0.13, trop 28. Last A1C 9.1 VBG 7.34, pCO2 is 52. CT showed new mucous secretions in the bronchus intermedius.Unchanged infectious/inflammatory small airways disease in the right middle/lower lobes with multifocal small tree-in-bud nodules.Status post ascending aortic and aortic valve replacement, with residual dissection flap better seen on the prior study.Stable indeterminate small left renal nodule and numerous pancreatic cysts. In the ED, she was on Duonebs, LR 500cc, ertapenem adn benadryl was given as well.  (11 Aug 2023 22:09)      Allergies    tetanus toxoid (Short breath)  cefepime (Anaphylaxis)  penicillin (Anaphylaxis)  Avelox (Short breath; Pruritus)  Dilaudid (Short breath)  codeine (Short breath)  aspirin (Short breath)  iodine (Short breath; Swelling)  Valium (Short breath)  shellfish (Anaphylaxis)    Intolerances        MEDICATIONS  (STANDING):  albuterol/ipratropium for Nebulization 3 milliLiter(s) Nebulizer every 6 hours  apixaban 5 milliGRAM(s) Oral every 12 hours  atorvastatin 20 milliGRAM(s) Oral at bedtime  buDESOnide    Inhalation Suspension 0.5 milliGRAM(s) Inhalation two times a day  chlorhexidine 4% Liquid 1 Application(s) Topical daily  dextrose 5%. 1000 milliLiter(s) (50 mL/Hr) IV Continuous <Continuous>  dextrose 5%. 1000 milliLiter(s) (100 mL/Hr) IV Continuous <Continuous>  dextrose 50% Injectable 12.5 Gram(s) IV Push once  dextrose 50% Injectable 25 Gram(s) IV Push once  dextrose 50% Injectable 25 Gram(s) IV Push once  folic acid 1 milliGRAM(s) Oral daily  glucagon  Injectable 1 milliGRAM(s) IntraMuscular once  insulin glargine Injectable (LANTUS) 32 Unit(s) SubCutaneous at bedtime  insulin lispro (ADMELOG) corrective regimen sliding scale   SubCutaneous at bedtime  insulin lispro (ADMELOG) corrective regimen sliding scale   SubCutaneous three times a day before meals  insulin lispro Injectable (ADMELOG) 16 Unit(s) SubCutaneous three times a day with meals  methylPREDNISolone   Oral   methylPREDNISolone 24 milliGRAM(s) Oral daily  mexiletine 200 milliGRAM(s) Oral every 8 hours  nystatin    Suspension 617420 Unit(s) Swish and Swallow two times a day  pantoprazole    Tablet 40 milliGRAM(s) Oral before breakfast  polyethylene glycol 3350 17 Gram(s) Oral two times a day  senna 2 Tablet(s) Oral at bedtime  sodium chloride 7% Inhalation 4 milliLiter(s) Inhalation every 6 hours  tiotropium 2.5 MICROgram(s) Inhaler 2 Puff(s) Inhalation daily  trimethoprim  160 mG/sulfamethoxazole 800 mG 1 Tablet(s) Oral daily    MEDICATIONS  (PRN):  acetaminophen     Tablet .. 650 milliGRAM(s) Oral every 6 hours PRN Temp greater or equal to 38C (100.4F), Mild Pain (1 - 3)  aluminum hydroxide/magnesium hydroxide/simethicone Suspension 30 milliLiter(s) Oral every 4 hours PRN Dyspepsia  dextrose Oral Gel 15 Gram(s) Oral once PRN Blood Glucose LESS THAN 70 milliGRAM(s)/deciliter  diphenhydrAMINE 25 milliGRAM(s) Oral daily PRN Allergy symptoms  melatonin 3 milliGRAM(s) Oral at bedtime PRN Insomnia  ondansetron Injectable 4 milliGRAM(s) IV Push every 8 hours PRN Nausea and/or Vomiting      PAST MEDICAL & SURGICAL HISTORY:  Atrial fibrillation  paroxysmal, on eliquis      Diabetes  Type 2      COPD (chronic obstructive pulmonary disease)      Adrenal insufficiency  Medrol daily for over 50 years      Aortic insufficiency  moderate AR on echo 5/3/2018      Pelvic fracture      Asthma      Tracheobronchomalacia  diagnosed 2015, s/p bronchial thermoplasty 2016 (Dr Zapien); recent bronchoscopy 6/5/2018 revealed no evidence of tracheobronchomalacia in trachea or bronchial tubes      Colorectal cancer  4/2018- last treatment , chemo and radiation      Rectal bleeding      Seizure  x 1 1/7/18      DVT (deep venous thrombosis)  15-20 years ago, took coumadin      TIA (transient ischemic attack)  multiple, last 5 years ago - presents as right-sided weakness      History of partial hysterectomy  30 years ago - fibroids      H/O total knee replacement, bilateral  5 years ago      History of sinus surgery  multiple sinus surgeries      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      H/O pelvic surgery  5 years ago - s/p fracture      History of tracheomalacia  2015 - attempted tracheal stenting ([facility])- course complicated by obstruction, respiratory failure, multiple CPR attempts -  stent discontinued; 10/20/2016 Tracheobronchoplasty (Prolene Mesh) performed at [facility] by Dr Zapien      S/P bronchoscopy  6/5/2018 - [place] (Dr Zapien) no evidence of tracheobronchomalacia in trachea or bronchial tubes      Rectal bleeding  exam under anesthesia ([facility]) 2/2018          FAMILY HISTORY:  Family history of asthma    Family history of breast cancer (Sibling)    Family history of diabetes mellitus type II        SOCIAL HISTORY: No EtOH, no tobacco    REVIEW OF SYSTEMS:    CONSTITUTIONAL: No weakness, fevers or chills  EYES/ENT: No visual changes;  No vertigo or throat pain   NECK: No pain or stiffness  RESPIRATORY: No cough, wheezing, hemoptysis; No shortness of breath  CARDIOVASCULAR: No chest pain or palpitations  GASTROINTESTINAL: No abdominal or epigastric pain. No nausea, vomiting, or hematemesis; No diarrhea or constipation. No melena or hematochezia.  GENITOURINARY: No dysuria, frequency or hematuria  NEUROLOGICAL: No numbness or weakness  SKIN: No itching, burning, rashes, or lesions   All other review of systems is negative unless indicated above.        T(F): 98.1 (08-25-23 @ 12:32), Max: 98.7 (08-24-23 @ 20:01)  HR: 86 (08-25-23 @ 12:32) (83 - 87)  BP: 118/75 (08-25-23 @ 12:32)  RR: 18 (08-25-23 @ 12:32)  SpO2: 100% (08-25-23 @ 12:32) (96% - 100%)    Physical Exam  GENERAL: NAD, well-developed  HEAD:  Atraumatic, Normocephalic  EYES: EOMI, PERRLA, conjunctiva and sclera clear  NECK: Supple, No JVD  CHEST/LUNG: Clear to auscultation bilaterally; No wheeze  HEART: Regular rate and rhythm; No murmurs, rubs, or gallops  ABDOMEN: Soft, Nontender, Nondistended; Bowel sounds present  EXTREMITIES:  2+ Peripheral Pulses, No clubbing, cyanosis, or edema  NEUROLOGY: non-focal  SKIN: No rashes or lesions                          6.9    12.96 )-----------( 216      ( 25 Aug 2023 11:38 )             24.0       08-25    139  |  103  |  23  ----------------------------<  269<H>  4.8   |  23  |  0.64    Ca    8.7      25 Aug 2023 11:38  Phos  2.5     08-25  Mg     1.9     08-25    TPro  5.5<L>  /  Alb  3.4  /  TBili  0.8  /  DBili  x   /  AST  52<H>  /  ALT  25  /  AlkPhos  65  08-25      Magnesium: 1.9 mg/dL (08-25 @ 11:38)  Phosphorus: 2.5 mg/dL (08-25 @ 11:38)      Iron Total: 232 ug/dL (08-24-23 @ 12:04)  Unsaturated Iron Binding Capacity: 131 ug/dL (08-24-23 @ 12:04)  Iron - Total Binding Capacity.: 363 ug/dL (08-24-23 @ 12:04)  % Saturation, Iron: 64 % (08-24-23 @ 12:04)  Ferritin: 83 ng/mL (08-24-23 @ 12:04)      Imaging:  < from: TTE Congenital Anomalies W or WO Ultrasound Enhancing Agent (08.21.23 @ 16:49) >  CONCLUSIONS:      1. Left ventricular systolic function is hyperdynamic with an ejection fraction visually estimated at 75 %.   2. Severe aortic stenosis.    ________________________________________________________________________________________  FINDINGS:     Left Ventricle:  Left ventricular systolic function is hyperdynamic with an ejection fraction visually estimated at 75%.     Aortic Valve:  A transcatheter deployed (TAVR) (valve-in-valve) is present in the aortic position.     < end of copied text >  < from: CT Abdomen and Pelvis No Cont (08.23.23 @ 22:52) >    IMPRESSION:  No evidence of hemoperitoneum, retroperitoneal hemorrhage or abdominal   wall hematoma.    Moderate to large amount stool throughout the colon with nonspecific   collapse of the distal colon just proximal to the colostomy.  No obvious   mass or inflammatory changes at site of caliber change in the distal   colon.  Constipation and/or partial large bowel obstruction should be   excluded clinically.    Redemonstration of tree-in-bud nodules in the right lung base, unchanged   from 08/01/2023.  Infectious etiology should be excluded clinically.    Redemonstration of small complex cystic left renal lesions.    Redemonstration of multiple pancreatic cysts.    Redemonstration of chronic dissection flap in the distal descending   thoracic aorta and abdominal aorta.    < end of copied text >  < from: Xray Chest 1 View- PORTABLE-Urgent (08.11.23 @ 16:46) >  IMPRESSION:  Clear lungs.    < end of copied text >   Hematology/Oncology Consult Note    HPI:  72 year old woman PMHx  asthma on chronic steroids, bronchiectasis s/p surgery, allergic rhinitis,  recurrent PNA, PND, tracheomalacia s/p tracheoplasty, ESBL proteus infections (sputum),  diabetes, paroxysmal A-fib on Eliquis, colorectal cancer many years ago status post colostomy presenting with concern for shortness of breath. It started 2 and half weeks ago with dysnea on exertion, increased sputum production and running nose. She also endorsed sweatiness, chill, abd pain, denied fever, n/v/c/d, sick contact. she is up to date with vaccines. she has had chronic shortness of breath and cough for years. SHe had a recent hospitalization at an outside hospital  for acute respiratory failure with hypoxia secondary to asthma/COPD exacerbation and bronchopneumonia 6/5/2023 - 6/16/2023), She was found to have ESBL/Proteus/yeast in sputum culture and was treated with ertapenem/Benadryl/vancomycin/aztreonam and methylprednisolone.   She was discharged home with a midline and completed a course of antibiotics ertapenem (last dose 06/25).Dr. Allison also prescribed tobramycin after discharge,  and her metylprednisolone dose was reduced from 32 to 28 mg po daily from last monday to this monday. She states since dose reduction she has had worsened dyspnea. when she called Dr. Allison's office, he urged the patient to come to the ED for further management.     Of note had chronic dyspnea/severe asthma since childhood. She states she had a severe sinus infection requiring surgery at ?12 yo and since then has had difficulties with breathing. She has been on chronic steroids for over 20 years per report.. She uses Breo, Spiriva, albuterol neb and a cough assist device.     In the ED, BP stable 121/65, RR 22, saturating at RA at 100%, 97.5 temp. CBC has 10.56 HGB 10, platelet 227. chemistry normal except glucose 272. procal 0.13, trop 28. Last A1C 9.1 VBG 7.34, pCO2 is 52. CT showed new mucous secretions in the bronchus intermedius.Unchanged infectious/inflammatory small airways disease in the right middle/lower lobes with multifocal small tree-in-bud nodules.Status post ascending aortic and aortic valve replacement, with residual dissection flap better seen on the prior study.Stable indeterminate small left renal nodule and numerous pancreatic cysts. In the ED, she was on Duonebs, LR 500cc, ertapenem adn benadryl was given as well.  (11 Aug 2023 22:09)      Allergies    tetanus toxoid (Short breath)  cefepime (Anaphylaxis)  penicillin (Anaphylaxis)  Avelox (Short breath; Pruritus)  Dilaudid (Short breath)  codeine (Short breath)  aspirin (Short breath)  iodine (Short breath; Swelling)  Valium (Short breath)  shellfish (Anaphylaxis)    Intolerances        MEDICATIONS  (STANDING):  albuterol/ipratropium for Nebulization 3 milliLiter(s) Nebulizer every 6 hours  apixaban 5 milliGRAM(s) Oral every 12 hours  atorvastatin 20 milliGRAM(s) Oral at bedtime  buDESOnide    Inhalation Suspension 0.5 milliGRAM(s) Inhalation two times a day  chlorhexidine 4% Liquid 1 Application(s) Topical daily  dextrose 5%. 1000 milliLiter(s) (50 mL/Hr) IV Continuous <Continuous>  dextrose 5%. 1000 milliLiter(s) (100 mL/Hr) IV Continuous <Continuous>  dextrose 50% Injectable 12.5 Gram(s) IV Push once  dextrose 50% Injectable 25 Gram(s) IV Push once  dextrose 50% Injectable 25 Gram(s) IV Push once  folic acid 1 milliGRAM(s) Oral daily  glucagon  Injectable 1 milliGRAM(s) IntraMuscular once  insulin glargine Injectable (LANTUS) 32 Unit(s) SubCutaneous at bedtime  insulin lispro (ADMELOG) corrective regimen sliding scale   SubCutaneous at bedtime  insulin lispro (ADMELOG) corrective regimen sliding scale   SubCutaneous three times a day before meals  insulin lispro Injectable (ADMELOG) 16 Unit(s) SubCutaneous three times a day with meals  methylPREDNISolone   Oral   methylPREDNISolone 24 milliGRAM(s) Oral daily  mexiletine 200 milliGRAM(s) Oral every 8 hours  nystatin    Suspension 603732 Unit(s) Swish and Swallow two times a day  pantoprazole    Tablet 40 milliGRAM(s) Oral before breakfast  polyethylene glycol 3350 17 Gram(s) Oral two times a day  senna 2 Tablet(s) Oral at bedtime  sodium chloride 7% Inhalation 4 milliLiter(s) Inhalation every 6 hours  tiotropium 2.5 MICROgram(s) Inhaler 2 Puff(s) Inhalation daily  trimethoprim  160 mG/sulfamethoxazole 800 mG 1 Tablet(s) Oral daily    MEDICATIONS  (PRN):  acetaminophen     Tablet .. 650 milliGRAM(s) Oral every 6 hours PRN Temp greater or equal to 38C (100.4F), Mild Pain (1 - 3)  aluminum hydroxide/magnesium hydroxide/simethicone Suspension 30 milliLiter(s) Oral every 4 hours PRN Dyspepsia  dextrose Oral Gel 15 Gram(s) Oral once PRN Blood Glucose LESS THAN 70 milliGRAM(s)/deciliter  diphenhydrAMINE 25 milliGRAM(s) Oral daily PRN Allergy symptoms  melatonin 3 milliGRAM(s) Oral at bedtime PRN Insomnia  ondansetron Injectable 4 milliGRAM(s) IV Push every 8 hours PRN Nausea and/or Vomiting      PAST MEDICAL & SURGICAL HISTORY:  Atrial fibrillation  paroxysmal, on eliquis      Diabetes  Type 2      COPD (chronic obstructive pulmonary disease)      Adrenal insufficiency  Medrol daily for over 50 years      Aortic insufficiency  moderate AR on echo 5/3/2018      Pelvic fracture      Asthma      Tracheobronchomalacia  diagnosed 2015, s/p bronchial thermoplasty 2016 (Dr Zapien); recent bronchoscopy 6/5/2018 revealed no evidence of tracheobronchomalacia in trachea or bronchial tubes      Colorectal cancer  4/2018- last treatment , chemo and radiation      Rectal bleeding      Seizure  x 1 1/7/18      DVT (deep venous thrombosis)  15-20 years ago, took coumadin      TIA (transient ischemic attack)  multiple, last 5 years ago - presents as right-sided weakness      History of partial hysterectomy  30 years ago - fibroids      H/O total knee replacement, bilateral  5 years ago      History of sinus surgery  multiple sinus surgeries      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      H/O pelvic surgery  5 years ago - s/p fracture      History of tracheomalacia  2015 - attempted tracheal stenting ([facility])- course complicated by obstruction, respiratory failure, multiple CPR attempts -  stent discontinued; 10/20/2016 Tracheobronchoplasty (Prolene Mesh) performed at [facility] by Dr Zapien      S/P bronchoscopy  6/5/2018 - [place] (Dr Zapien) no evidence of tracheobronchomalacia in trachea or bronchial tubes      Rectal bleeding  exam under anesthesia ([facility]) 2/2018          FAMILY HISTORY:  Family history of asthma    Family history of breast cancer (Sibling)    Family history of diabetes mellitus type II        SOCIAL HISTORY: No EtOH, no tobacco    REVIEW OF SYSTEMS:    CONSTITUTIONAL: + weakness, no fevers or chills  EYES/ENT: No visual changes;  No vertigo or throat pain   NECK: No pain or stiffness  RESPIRATORY: No cough, wheezing, hemoptysis; + shortness of breath  CARDIOVASCULAR: No chest pain or palpitations  GASTROINTESTINAL: No abdominal or epigastric pain. No nausea, vomiting, or hematemesis; No diarrhea or constipation. No melena or hematochezia.  GENITOURINARY: No dysuria, frequency or hematuria  NEUROLOGICAL: No numbness or weakness  SKIN: No itching, burning, rashes, or lesions   All other review of systems is negative unless indicated above.        T(F): 98.1 (08-25-23 @ 12:32), Max: 98.7 (08-24-23 @ 20:01)  HR: 86 (08-25-23 @ 12:32) (83 - 87)  BP: 118/75 (08-25-23 @ 12:32)  RR: 18 (08-25-23 @ 12:32)  SpO2: 100% (08-25-23 @ 12:32) (96% - 100%)    Physical Exam  GENERAL: NAD, well-developed  HEAD:  Atraumatic, Normocephalic  EYES: EOMI, PERRLA, conjunctiva and sclera clear  NECK: Supple, No JVD  CHEST/LUNG: wheeze throughout lung fields  HEART: Regular rate and rhythm; No murmurs, rubs, or gallops  ABDOMEN: Soft, Nontender, Nondistended; Bowel sounds present  EXTREMITIES:  2+ Peripheral Pulses, No clubbing, cyanosis, or edema  NEUROLOGY: non-focal  SKIN: vascular changes LE b/l                          6.9    12.96 )-----------( 216      ( 25 Aug 2023 11:38 )             24.0       08-25    139  |  103  |  23  ----------------------------<  269<H>  4.8   |  23  |  0.64    Ca    8.7      25 Aug 2023 11:38  Phos  2.5     08-25  Mg     1.9     08-25    TPro  5.5<L>  /  Alb  3.4  /  TBili  0.8  /  DBili  x   /  AST  52<H>  /  ALT  25  /  AlkPhos  65  08-25      Magnesium: 1.9 mg/dL (08-25 @ 11:38)  Phosphorus: 2.5 mg/dL (08-25 @ 11:38)      Iron Total: 232 ug/dL (08-24-23 @ 12:04)  Unsaturated Iron Binding Capacity: 131 ug/dL (08-24-23 @ 12:04)  Iron - Total Binding Capacity.: 363 ug/dL (08-24-23 @ 12:04)  % Saturation, Iron: 64 % (08-24-23 @ 12:04)  Ferritin: 83 ng/mL (08-24-23 @ 12:04)      Imaging:  < from: TTE Congenital Anomalies W or WO Ultrasound Enhancing Agent (08.21.23 @ 16:49) >  CONCLUSIONS:      1. Left ventricular systolic function is hyperdynamic with an ejection fraction visually estimated at 75 %.   2. Severe aortic stenosis.    ________________________________________________________________________________________  FINDINGS:     Left Ventricle:  Left ventricular systolic function is hyperdynamic with an ejection fraction visually estimated at 75%.     Aortic Valve:  A transcatheter deployed (TAVR) (valve-in-valve) is present in the aortic position.     < end of copied text >  < from: CT Abdomen and Pelvis No Cont (08.23.23 @ 22:52) >    IMPRESSION:  No evidence of hemoperitoneum, retroperitoneal hemorrhage or abdominal   wall hematoma.    Moderate to large amount stool throughout the colon with nonspecific   collapse of the distal colon just proximal to the colostomy.  No obvious   mass or inflammatory changes at site of caliber change in the distal   colon.  Constipation and/or partial large bowel obstruction should be   excluded clinically.    Redemonstration of tree-in-bud nodules in the right lung base, unchanged   from 08/01/2023.  Infectious etiology should be excluded clinically.    Redemonstration of small complex cystic left renal lesions.    Redemonstration of multiple pancreatic cysts.    Redemonstration of chronic dissection flap in the distal descending   thoracic aorta and abdominal aorta.    < end of copied text >  < from: Xray Chest 1 View- PORTABLE-Urgent (08.11.23 @ 16:46) >  IMPRESSION:  Clear lungs.

## 2023-08-25 NOTE — PROVIDER CONTACT NOTE (CRITICAL VALUE NOTIFICATION) - ACTION/TREATMENT ORDERED:
MD made aware. no further recs noted. Will continue to monitor patient.
waiting for orders
No new recommendations at this time
see eMAR; continue with treatment
MD to come to bedside to speak to patient
will continue to monitor. MD aware.

## 2023-08-25 NOTE — DISCHARGE NOTE PROVIDER - NSDCFUSCHEDAPPT_GEN_ALL_CORE_FT
[place]  [facility]  INTMED  [facility]   Scheduled Appointment: 09/13/2023    Moreno Panchal  [facility]  END[address]CRIN [address] [facility] [address]  Scheduled Appointment: 10/26/2023     Melinda Jean  [facility]  CTSURG [address]  Scheduled Appointment: 09/12/2023    Moreno Panchal  [facility]  [address]CRIN [address]  Scheduled Appointment: 10/26/2023

## 2023-08-25 NOTE — PROGRESS NOTE ADULT - PROBLEM SELECTOR PLAN 3
- MCV 77.8, haptoglobin low, LDH high, normal Tbili, Plt w/n range, no bruising, skin changes, no splenomegaly  - iron studies ok  - TAVR w/ severe stenosis, macroangiopathic hemolytic anemia?

## 2023-08-25 NOTE — DISCHARGE NOTE PROVIDER - NSDCCPTREATMENT_GEN_ALL_CORE_FT
PRINCIPAL PROCEDURE  Procedure: Transthoracic echocardiogram  Findings and Treatment: CONCLUSIONS:      1. Left ventricular systolic function is hyperdynamic with an ejection fraction visually estimated at 75 %.   2. Severe aortic stenosis.  ________________________________________________________________________________________  FINDINGS:     Left Ventricle:  Left ventricular systolic function is hyperdynamic with an ejection fraction visually estimated at 75%.     Aortic Valve:  A transcatheter deployed (TAVR) (valve-in-valve) is present in the aortic position. The effective orifice area (by Continuity Equation) is 0.79 cm² (indexed area = 0.45 cm²/m²) with a Doppler velocity index of 0.31. The peak veloicty is 4.59 m/s, peak gradient is 84.3 mmHg and mean gradient is 56.0 mmHg. There is severe aortic stenosis. The highest velocity was obtained from the apical window. There is mild aortic regurgitation.     Mitral Valve:  There is calcification of the mitral valve annulus.  ____________________________________________________________________  Quantitative Data:  Left Ventricle Measurements: (Indexed to BSA)     Visualized LV EF%: 75%     LVOT / RVOT/ Qp/Qs Data: (Indexed to BSA)  LVOT Diameter: 2.20 cm  LVOT Vmax:     1.44 m/s  LVOT VTI:      20.30 cm  LVOT SV:       77.2 ml  44.54 ml/m²  Aortic Valve Measurements:  AV Vmax:           4.6 m/s  AV Peak Gradient:  84.3 mmHg  AV Mean Gradient:  56.0 mmHg  AV VTI:            98.3 cm  AV VTI Ratio:      0.21  AoV EOA, Contin:   0.79 cm²  AoV EOA, Contin i: 0.45 cm²/m²

## 2023-08-25 NOTE — DISCHARGE NOTE PROVIDER - CARE PROVIDER_API CALL
Eulalio Allison [place]  Pulmonary Disease  [address] [facility] [address]  [place] [address]  Phone: [phone]  Fax: [phone]  Established Patient  Follow Up Time:    Eulalio Allison Steven  Pulmonary Disease  [address] [facility] [address]  [place] [address]  Phone: [phone]  Fax: [phone]  Established Patient  Follow Up Time:     Melinda Jean (NP; RN)  NP in [address] [address]  Phone: [phone]  Fax: [phone]  Established Patient  Scheduled Appointment: 09/12/2023 08:45 AM   Eulalio Allsion  Pulmonary Disease  [address] [facility], [address]  [place] [address]  Phone: [phone]  Fax: [phone]  Established Patient  Follow Up Time:     Melinda Jean (NP; RN)  NP in [facility]  [address] [place] [address]  Phone: [phone]  Fax: [phone]  Established Patient  Scheduled Appointment: 09/12/2023 08:45 AM    Rico Glover  Cardiology  [address] [place] [address], [address] [place] [address]  Phone: [phone]  Fax: [phone]  Follow Up Time:     Alejandro Savage  Thoracic Surgery  [address] [facility], [address] [place] [address]  Phone: [phone]  Fax: [phone]  Follow Up Time:

## 2023-08-25 NOTE — CONSULT NOTE ADULT - ATTENDING COMMENTS
72 year old woman PMHx  asthma on chronic steroids, bronchiectasis s/p surgery, allergic rhinitis,  recurrent PNA, PND, tracheomalacia s/p tracheoplasty, ESBL proteus infections (sputum),  diabetes, paroxysmal A-fib on Eliquis, colorectal cancer status post colostomy in remission presenting with concern for shortness of breath found to have acute on chronic bronchiectasis exacerbation c/b worsening anemia. Hematology on board for anemia work up.    #Microcytic Anemia  -Multiple causes  -Iron elevated but ferritin low normal  -Hapto low, LDH elevated, Retic elevated concerning for hemolysis  -Pending Darby to check for autoimmune cause, if darby is negative; recommend to transfuse one unit of PRBCs.   -Can start Folic acid 1mg daily  -Check B12/Folate  -Smear reviewed many fragmented RBC, nucleated RBCs, hypersegmented neutrophils. Hemolysis is likely secondary to severe aortic stenosis.   -Low suspicion for MM, given FLC ratio 2

## 2023-08-25 NOTE — CONSULT NOTE ADULT - ASSESSMENT
72 yr old female with multiple medical problems including severe chronic lung disease.  She returns with dyspnea.  She is s/p repair of acute type A aortic dissection 9/6/2022 using a bio-Bentall procedure with 21 mm bioprosthetic aortic valve. Recent echo reveals prosthetic aortic valve stenosis.  Per Dr. Cabrera patient is not a candidate for redo open surgical AVR.  Will plan to discuss with Structural heart team if there is an approach for valve in valve TAVR.

## 2023-08-25 NOTE — PROVIDER CONTACT NOTE (CRITICAL VALUE NOTIFICATION) - NAME OF MD/NP/PA/DO NOTIFIED:
DR. JULIOCESAR Molina
Dr. Lázaro Molina
Dr. Lázaro Molina
irineo oseguera
Resident Doctor
Lexi Hidalgoag

## 2023-08-25 NOTE — PROGRESS NOTE ADULT - ATTENDING COMMENTS
This is a 72F with h/o bronchiectasis on chronic steroid, s/p surgery, allergic rhinitis,  recurrent PNA, PND, tracheomalacia s/p tracheoplasty, ESBL proteus infections (sputum), T2DM, paroxysmal A-fib on Eliquis, colorectal cancer many years ago status post colostomy presenting with shortness of breath for 3 days. Prior issues with ESBL/Proteus/yeast in sputum culture and was treated with ertapenem/Benadryl/vancomycin/aztreonam and methylprednisolone. She was discharged home with a midline and completed a course of antibiotics ertapenem (last dose 06/25). Recently she was treated for MRSA and pseudomonas in sputum and completed Tobra nebs and doxycycline.    1. Acute on chronic hypoxic RF due to exacerbation of bronchiectasis (on chronic steroid)  2. Recent tracheo-bronchitis due to ESBL proteus/MRSA/pseudomonas  3. Anemia, multifactorial  4. Paroxysmal A-fib on Eliquis  5. H/o colorectal cancer, status post colostomy    - Feels tired, afebrile, no chest pain, breathing improving- less cough, O2 97-99% on ambulation off O2.  - repeat sputum c/s grew MRSA, now on PO Zyvox for 7 days  - Heme consult called for Hb 6.9 and positive hemolysis ( in May Hb was 12), FOBT neg, Iron studies. Will transfuse 1u PRBC today  - Also called CTS- Dr Cabrera's office as Echo showed severe AS, s/p TAVR  - Pulmonary plans noted- on bronchodilators, PO prednisone taper, inhaled steroid, s/p IV Ertapenem and Hypertonic saline to 7% for airway clearance device (patient brought her own from home) and chest PT as tolerated   - CT chest shows unchanged infectious/inflammatory small airways disease in the right middle/lower lobes with multifocal small tree-in-bud nodules. Status post ascending aortic and aortic valve replacement, with residual dissection flap better seen on the prior study. Stable indeterminate small left renal nodule and numerous pancreatic cysts.  - Echo bubble study no shunt  - Endocrine f/u plans noted- on basal/bolus insulins FS elevated due to steroid  - Her outpatient Pul- Dr Allison is aware.  ** spoke to her Dtr- Zoe, not a candidate for [facility] as she used up all rehab days, d/c planning w home PT once stable, needs to call Zoe for home care arrangement. This is a 72F with h/o bronchiectasis on chronic steroid, s/p surgery, allergic rhinitis,  recurrent PNA, PND, tracheomalacia s/p tracheoplasty, ESBL proteus infections (sputum), T2DM, paroxysmal A-fib on Eliquis, colorectal cancer many years ago status post colostomy presenting with shortness of breath for 3 days. Prior issues with ESBL/Proteus/yeast in sputum culture and was treated with ertapenem/Benadryl/vancomycin/aztreonam and methylprednisolone. She was discharged home with a midline and completed a course of antibiotics ertapenem (last dose 06/25). Recently she was treated for MRSA and pseudomonas in sputum and completed Tobra nebs and doxycycline.    1. Acute on chronic hypoxic RF due to exacerbation of bronchiectasis (on chronic steroid)  2. Recent tracheo-bronchitis due to ESBL proteus/MRSA/pseudomonas  3. Anemia, likely hemolysis and irion deficiency  4. Paroxysmal A-fib on Eliquis  5. H/o colorectal cancer, status post colostomy    - Feels tired, afebrile, no chest pain, breathing improving- less cough, O2 97-99% on ambulation off O2.  - repeat sputum c/s grew MRSA, now on PO Zyvox for 7 days  - Heme consult called for Hb 6.9 and positive hemolysis ( in May Hb was 12), FOBT neg, Iron studies. Will transfuse 1u PRBC today  - Also called CTS- Dr Cabrera's office as Echo showed severe AS, s/p TAVR  - Pulmonary plans noted- on bronchodilators, PO prednisone taper, inhaled steroid, s/p IV Ertapenem and Hypertonic saline to 7% for airway clearance device (patient brought her own from home) and chest PT as tolerated   - CT chest shows unchanged infectious/inflammatory small airways disease in the right middle/lower lobes with multifocal small tree-in-bud nodules. Status post ascending aortic and aortic valve replacement, with residual dissection flap better seen on the prior study. Stable indeterminate small left renal nodule and numerous pancreatic cysts.  - Echo bubble study no shunt  - Endocrine f/u plans noted- on basal/bolus insulins FS elevated due to steroid  - Her outpatient Pul- Dr Allison is aware.  ** spoke to her Dtr- Zoe, not a candidate for [facility] as she used up all rehab days, d/c planning w home PT once stable, needs to call Zoe for home care arrangement.

## 2023-08-25 NOTE — PROGRESS NOTE ADULT - PROBLEM SELECTOR PLAN 1
Test BG ac and hs and 2am while on steroids  Change Lantus dose to 32 units qhs for now  Change Admelog dose to 16 units qac for now.  Will follow  C/w Mod correction scale qac + bedtime  Please contact endo team with any changes on steroid doses   Discharge:  Will be determined according to BG/insulin needs/PO intake and steroid therapy at time of discharge.  Plan to d/c on basal bolus. Doses TBD  Outpt. endo follow-up. Dr Saavedra  Outpt. optho, podiatry, micro/cr  Make sure pt has Rx for all DM supplies and insulin/ DM meds. Consider [employer] Luis E 3 if going home

## 2023-08-25 NOTE — PROGRESS NOTE ADULT - ASSESSMENT
72 F w/h/o uncontrolled T2DM (A1C 9.4%) on basal/bolus insulin PTA. Unknown DM complications. Also COPD, secondary adrenal Insufficiency on chronic steroids, colorectal cancer s/p resection (colostomy bag), Chronic A fib on Eliquis, and tracheomalacia and multiple intubations, type A aortic dissection s/p aortic dissection repair on 9/07/22, sepsis. Pt well known to this provider from prior admissions. Here with SOB> treated for PNA and on Prednisolone to 24mg, also found to have anemia and  severe aortic stenosis> hematology and ct surgery following pt. Endocrine consulted for assistance with uncontrolled DM/steroids for AI on steroid pulse for SOB with BG levesl variable and going up in the last 2 days. Will continue to adjust insulin doses to BG goal 100 to 180s.     Will start [employer] Luis E 3 if pt going home. Needs keshia on phone but pt states she wants daughter to do it.

## 2023-08-25 NOTE — PROVIDER CONTACT NOTE (CRITICAL VALUE NOTIFICATION) - PERSON GIVING RESULT:
Ludin Berry, core lab
Oscar Perez
ashu miller/ fer
Core Lab: Holland Sanchez
Oscar Perez
Jane Keys, Core lab

## 2023-08-25 NOTE — DISCHARGE NOTE PROVIDER - NSDCFUADDINST_GEN_ALL_CORE_FT
Please come to ED or Call Cardio thoracic office at [phone] if Chest pain, Shortness of Breath, persistant Nausea & vomiting, oozing from wounds,palpitations or pain not relieved with medication  Weigh yourself daily>notify surgeons office if  2 lb increase in weight in 24 hours.  1. Take ALL of your medications as ordered. Fill your prescriptions the day you are discharged and take according to the schedule you were given. Continue to take a stool softener if you are taking narcotic pain medications. AVOID medications such as ibuprofen or naproxen if you have had bypass surgery. If you have any questions or are unable to fill the prescriptions, please call the office right away at [phone].  2. Shower daily. Clean all incisions daily while showering with warm water and mild soap, pat dry with a clean towel and do not cover with any dressings unless instructed to. No bathing, swimming in a pool or the ocean until instructed by MD.  DO NOT use creams or lotions on the wound.  3. We advise that you do not drive until instructed by MD. Use your red pillow between chest and seatbelt to avoid injury in the event of a motor vehicle accident until you see the surgeon again in the office.  4. You may not return to work until instructed by MD.   5. Please eat a low fat, low cholesterol, low salt diet. (No added/extra salt). A nutritional supplement like Ensure or Glucerna (for diabetics) may help you reach your nutritional goals after surgery and aid in your recovery.  6. Weigh yourself every day in the morning and record it in the weight log in your red folder. Notify the office of any weight gain more than 2-3 pounds in 24 hours.  **Please LIMIT YOUR FLUID INTAKE TO ABOUT 4 8 OUNCE GLASSES OF BEVERAGE DAILY.**  7. Continue breathing exercises several times a day. Continue to use your heart pillow when coughing.  8. No heavy lifting nothing greater than 5 pounds until cleared by MD. We recommend that you sleep on your back and not your side or stomach for the next 4-6 weeks.  9. Call / Notify MD any fever greater than 101.0, any drainage from incisions or if they become red, hot or very tender to the touch.  10. Increase activity as tolerated. Walk indoors and/or outdoors at least 3 times a day.

## 2023-08-25 NOTE — PROGRESS NOTE ADULT - SUBJECTIVE AND OBJECTIVE BOX
72 yr old female with multiple medical problems including severe chronic lung disease.  She returns with dyspnea.  She is s/p repair of acute type A aortic dissection 9/6/2022 using a bio-Bentall procedure with 21 mm bioprosthetic aortic valve.   Recent echo reveals prosthetic aortic valve stenosis.  I do not think she is a candidate for redo open surgical AVR.  Will discuss with Structural heart team if there is an approach for valve in valve TAVR.

## 2023-08-25 NOTE — PROGRESS NOTE ADULT - SUBJECTIVE AND OBJECTIVE BOX
DIABETES FOLLOW UP NOTE: Saw pt earlier today    Chief Complaint: Endocrine consult requested for management of T2DM    INTERVAL HX: Pt stable, reports tolerating POs with BG levels variable between 100s to 200s in last 24 hours. Noted FBG >200s for the past 2 days and requiring  15 to 20 units total insulin with meals. No hypoglycemia. Remains on  Methylprednisolone 24mg/day x 7 days tapering down back to base line dose of 8mg daily for AI.  States she has SOB at exertion    Review of Systems:  General: As above  Cardiovascular: No chest pain, palpitations  Respiratory: on and off SOB, no cough  GI: No nausea, vomiting, abdominal pain  Endocrine: No polyuria, polydipsia or S&Sx of hypoglycemia    Allergies    tetanus toxoid (Short breath)  cefepime (Anaphylaxis)  penicillin (Anaphylaxis)  Avelox (Short breath; Pruritus)  Dilaudid (Short breath)  codeine (Short breath)  aspirin (Short breath)  iodine (Short breath; Swelling)  Valium (Short breath)  shellfish (Anaphylaxis)    Intolerances      MEDICATIONS:  atorvastatin 20 milliGRAM(s) Oral at bedtime  insulin glargine Injectable (LANTUS) 32 Unit(s) SubCutaneous at bedtime  insulin lispro (ADMELOG) corrective regimen sliding scale   SubCutaneous three times a day before meals  insulin lispro (ADMELOG) corrective regimen sliding scale   SubCutaneous at bedtime  insulin lispro Injectable (ADMELOG) 16 Unit(s) SubCutaneous three times a day with meals  methylPREDNISolone 24 milliGRAM(s) Oral daily  trimethoprim  160 mG/sulfamethoxazole 800 mG 1 Tablet(s) Oral daily      PHYSICAL EXAM:  VITALS: T(C): 36.7 (08-25-23 @ 12:32)  T(F): 98.1 (08-25-23 @ 12:32), Max: 98.7 (08-24-23 @ 20:01)  HR: 83 (08-25-23 @ 13:59) (83 - 87)  BP: 117/70 (08-25-23 @ 13:59) (97/58 - 118/75)  RR:  (18 - 18)  SpO2:  (96% - 100%)  Wt(kg): --  GENERAL: Female laying in bed in NAD  Abdomen: Soft, nontender, non distended  Extremities: Warm, no edema in all 4 exts  NEURO: Alert and able to answer questions    LABS:  POCT Blood Glucose.: 216 mg/dL (08-25-23 @ 17:29)  POCT Blood Glucose.: 119 mg/dL (08-25-23 @ 13:30)  POCT Blood Glucose.: 284 mg/dL (08-25-23 @ 09:01)  POCT Blood Glucose.: 231 mg/dL (08-25-23 @ 01:38)  POCT Blood Glucose.: 200 mg/dL (08-24-23 @ 21:25)  POCT Blood Glucose.: 210 mg/dL (08-24-23 @ 17:25)  POCT Blood Glucose.: 159 mg/dL (08-24-23 @ 12:53)  POCT Blood Glucose.: 231 mg/dL (08-24-23 @ 09:09)  POCT Blood Glucose.: 180 mg/dL (08-24-23 @ 01:49)  POCT Blood Glucose.: 158 mg/dL (08-23-23 @ 21:13)  POCT Blood Glucose.: 172 mg/dL (08-23-23 @ 17:15)  POCT Blood Glucose.: 125 mg/dL (08-23-23 @ 10:51)  POCT Blood Glucose.: 175 mg/dL (08-23-23 @ 01:46)  POCT Blood Glucose.: 296 mg/dL (08-22-23 @ 21:14)                            6.9    12.96 )-----------( 216      ( 25 Aug 2023 11:38 )             24.0       08-25    139  |  103  |  23  ----------------------------<  269<H>  4.8   |  23  |  0.64    eGFR: 93    Ca    8.7      08-25  Mg     1.9     08-25  Phos  2.5     08-25    TPro  5.5<L>  /  Alb  3.4  /  TBili  0.8  /  DBili  x   /  AST  52<H>  /  ALT  25  /  AlkPhos  65  08-25      A1C with Estimated Average Glucose Result: 9.1 % (06-17-23 @ 10:27)      Estimated Average Glucose: 214 mg/dL (06-17-23 @ 10:27)

## 2023-08-25 NOTE — PROGRESS NOTE ADULT - NUTRITIONAL ASSESSMENT
This patient has been assessed with a concern for Malnutrition and has been determined to have a diagnosis/diagnoses of Moderate protein-calorie malnutrition.    This patient is being managed with:   Diet Regular-  Consistent Carbohydrate {Evening Snack} (CSTCHOSN)  Supplement Feeding Modality:  Oral  Glucerna Shake Cans or Servings Per Day:  3       Frequency:  Three Times a day  Entered: Aug 25 2023  2:56PM

## 2023-08-25 NOTE — PROGRESS NOTE ADULT - SUBJECTIVE AND OBJECTIVE BOX
Lázaro Molina, PGY-1  Please contact on Teams    ÓSCARWILBERTOVIK RAYRAY  72y  Female      SUBJECTIVE/INTERVAL EVENTS: No acute events. Pt seen and examined at bedside.    PHYSICAL EXAM:    PHYSICAL EXAM:  GENERAL: NAD, lying comfortably in bed   HEAD:  Atraumatic, Normocephalic  EYES: EOMI b/l, PERRLA b/l, conjunctiva and sclera clear  NECK: Supple, No JVD, No LAD   CHEST/LUNG: coarse breath sounds, similar to prior  HEART: Regular rate and rhythm; S1 and S2 present  ABDOMEN: Soft, Nontender, Nondistended; Bowel sounds present  EXTREMITIES:  No clubbing, cyanosis, or edema  NEURO: AAOx3, non-focal   SKIN: No rashes     Vital Signs Last 24 Hrs  T(C): 36.8 (25 Aug 2023 05:38), Max: 37.1 (24 Aug 2023 20:01)  T(F): 98.3 (25 Aug 2023 05:38), Max: 98.7 (24 Aug 2023 20:01)  HR: 83 (25 Aug 2023 05:38) (82 - 87)  BP: 110/69 (25 Aug 2023 05:38) (97/58 - 115/75)  BP(mean): --  RR: 18 (25 Aug 2023 05:38) (18 - 20)  SpO2: 100% (25 Aug 2023 05:38) (96% - 100%)    Parameters below as of 25 Aug 2023 05:38  Patient On (Oxygen Delivery Method): room air        Consultant(s) Notes Reviewed:  [x] YES  [ ] NO  Care Discussed with Consultants/Other Providers [x] YES  [ ] NO    LABS:                        7.4    11.97 )-----------( 217      ( 24 Aug 2023 12:04 )             25.9                         7.2    13.41 )-----------( 226      ( 23 Aug 2023 07:16 )             24.9                               138    |  105    |  22                  Calcium: 8.8   / iCa: x      (08-24 @ 12:04)    ----------------------------<  209       Magnesium: 2.0                              4.6     |  22     |  0.73             Phosphorous: 3.0      TPro  5.5    /  Alb  3.4    /  TBili  0.8    /  DBili  x      /  AST  52     /  ALT  26     /  AlkPhos  64     24 Aug 2023 12:04    Urinalysis Basic - ( 24 Aug 2023 12:04 )    Color: x / Appearance: x / SG: x / pH: x  Gluc: 209 mg/dL / Ketone: x  / Bili: x / Urobili: x   Blood: x / Protein: x / Nitrite: x   Leuk Esterase: x / RBC: x / WBC x   Sq Epi: x / Non Sq Epi: x / Bacteria: x        RADIOLOGY & ADDITIONAL TESTS:    Imaging Personally Reviewed:  [x] YES  [ ] NO    MEDICATIONS  (STANDING):  albuterol/ipratropium for Nebulization 3 milliLiter(s) Nebulizer every 6 hours  apixaban 5 milliGRAM(s) Oral every 12 hours  atorvastatin 20 milliGRAM(s) Oral at bedtime  buDESOnide    Inhalation Suspension 0.5 milliGRAM(s) Inhalation two times a day  chlorhexidine 4% Liquid 1 Application(s) Topical daily  dextrose 5%. 1000 milliLiter(s) (100 mL/Hr) IV Continuous <Continuous>  dextrose 5%. 1000 milliLiter(s) (50 mL/Hr) IV Continuous <Continuous>  dextrose 50% Injectable 25 Gram(s) IV Push once  dextrose 50% Injectable 12.5 Gram(s) IV Push once  dextrose 50% Injectable 25 Gram(s) IV Push once  glucagon  Injectable 1 milliGRAM(s) IntraMuscular once  insulin glargine Injectable (LANTUS) 30 Unit(s) SubCutaneous at bedtime  insulin lispro (ADMELOG) corrective regimen sliding scale   SubCutaneous three times a day before meals  insulin lispro (ADMELOG) corrective regimen sliding scale   SubCutaneous at bedtime  insulin lispro Injectable (ADMELOG) 14 Unit(s) SubCutaneous three times a day with meals  methylPREDNISolone 24 milliGRAM(s) Oral daily  methylPREDNISolone   Oral   mexiletine 200 milliGRAM(s) Oral every 8 hours  nystatin    Suspension 830351 Unit(s) Swish and Swallow two times a day  pantoprazole    Tablet 40 milliGRAM(s) Oral before breakfast  polyethylene glycol 3350 17 Gram(s) Oral two times a day  senna 2 Tablet(s) Oral at bedtime  sodium chloride 7% Inhalation 4 milliLiter(s) Inhalation every 6 hours  tiotropium 2.5 MICROgram(s) Inhaler 2 Puff(s) Inhalation daily  trimethoprim  160 mG/sulfamethoxazole 800 mG 1 Tablet(s) Oral daily    MEDICATIONS  (PRN):  acetaminophen     Tablet .. 650 milliGRAM(s) Oral every 6 hours PRN Temp greater or equal to 38C (100.4F), Mild Pain (1 - 3)  aluminum hydroxide/magnesium hydroxide/simethicone Suspension 30 milliLiter(s) Oral every 4 hours PRN Dyspepsia  dextrose Oral Gel 15 Gram(s) Oral once PRN Blood Glucose LESS THAN 70 milliGRAM(s)/deciliter  diphenhydrAMINE 25 milliGRAM(s) Oral daily PRN Allergy symptoms  melatonin 3 milliGRAM(s) Oral at bedtime PRN Insomnia  ondansetron Injectable 4 milliGRAM(s) IV Push every 8 hours PRN Nausea and/or Vomiting      HEALTH ISSUES - PROBLEM Dx:  Acute exacerbation of bronchiectasis    Acute asthma exacerbation    SIRS without infection or organ dysfunction    Atrial fibrillation  paroxysmal, on eliquis    Diabetes mellitus    Prophylactic measure    Uncontrolled type 2 diabetes mellitus with hyperglycemia    Essential hypertension    Hyperlipidemia    H/O adrenal insufficiency

## 2023-08-25 NOTE — DISCHARGE NOTE PROVIDER - PROVIDER TOKENS
PROVIDER:[TOKEN:[[id number]],ESTABLISHEDPATIENT:[T]] PROVIDER:[TOKEN:[[id number]],ESTABLISHEDPATIENT:[T]],PROVIDER:[TOKEN:[[id number]],SCHEDULEDAPPT:[09/12/2023],SCHEDULEDAPPTTIME:[08:45 AM],ESTABLISHEDPATIENT:[T]] PROVIDER:[TOKEN:[[id number]],ESTABLISHEDPATIENT:[T]],PROVIDER:[TOKEN:[[id number]],SCHEDULEDAPPT:[09/12/2023],SCHEDULEDAPPTTIME:[08:45 AM],ESTABLISHEDPATIENT:[T]],PROVIDER:[TOKEN:[[id number]]],PROVIDER:[TOKEN:[[id number]]]

## 2023-08-25 NOTE — DISCHARGE NOTE PROVIDER - NSDCPNSUBOBJ_GEN_ALL_CORE
PHYSICAL EXAM  Neurology: alert and oriented x 3, nonfocal, no gross deficits  CV: (+) S1S2  Lungs: B/L Scatter Rhonchi and expiratory wheeze   Abdomen: soft, nontender, nondistended, positive bowel sounds, (+) Flatus; LUQ Colostomy --> SB  :  Voiding             Extremities:  B/L LE (-) edema; negative calf tenderness; (+) 2 DP palpable

## 2023-08-25 NOTE — CONSULT NOTE ADULT - ASSESSMENT
72 year old woman PMHx  asthma on chronic steroids, bronchiectasis s/p surgery, allergic rhinitis,  recurrent PNA, PND, tracheomalacia s/p tracheoplasty, ESBL proteus infections (sputum),  diabetes, paroxysmal A-fib on Eliquis, colorectal cancer status post colostomy in remission presenting with concern for shortness of breath found to have acute on chronic bronchiectasis exacerbation c/b worsening anemia. Hematology on board for anemia work up.    #Microcytic Anemia  -Multiple causes  -Iron elevated but ferritin low normal  -Hapto low, LDH elevated, Retic elevated concerning for hemolysis  -Pending Jenae to check for autoimmune cause  -Can start Folic acid 1mg daily  -Check B12/Folate  -Smear reviewed many fragmented RBC, nucleated RBCs, hypersegmented neutrophils  -Low suspicion for MM, given FLC ratio 2      Please page with questions or concerns. Will Follow with you.      Dejuan Erickson M.D.  Hematology/Oncology Fellow PGY6  Pager [phone]  After 5pm, please contact on-call team.   72 year old woman PMHx  asthma on chronic steroids, bronchiectasis s/p surgery, allergic rhinitis,  recurrent PNA, PND, tracheomalacia s/p tracheoplasty, ESBL proteus infections (sputum),  diabetes, paroxysmal A-fib on Eliquis, colorectal cancer status post colostomy in remission presenting with concern for shortness of breath found to have acute on chronic bronchiectasis exacerbation c/b worsening anemia. Hematology on board for anemia work up.    #Microcytic Anemia  -Multiple causes  -Iron elevated but ferritin low normal  -Hapto low, LDH elevated, Retic elevated concerning for hemolysis  -Pending Jenae to check for autoimmune cause; if negative can transfuse 1 unit  -Can start Folic acid 1mg daily  -Check B12/Folate  -Smear reviewed many fragmented RBC, nucleated RBCs, hypersegmented neutrophils  -Low suspicion for MM, given FLC ratio 2  -TTE shows severe stenosis  -Hemolysis most likely from aortic stenosis      Please page with questions or concerns. Will Follow with you.      Dejuan Erickson M.D.  Hematology/Oncology Fellow PGY6  Pager [phone]  After 5pm, please contact on-call team.   72 year old woman PMHx  asthma on chronic steroids, bronchiectasis s/p surgery, allergic rhinitis,  recurrent PNA, PND, tracheomalacia s/p tracheoplasty, ESBL proteus infections (sputum),  diabetes, paroxysmal A-fib on Eliquis, colorectal cancer status post colostomy in remission presenting with concern for shortness of breath found to have acute on chronic bronchiectasis exacerbation c/b worsening anemia. Hematology on board for anemia work up.    #Microcytic Anemia  -Multiple causes  -Iron elevated but ferritin low normal  -Hapto low, LDH elevated, Retic elevated concerning for hemolysis  -Pending Jenae to check for autoimmune cause; if negative can transfuse 1 unit  -Can start Folic acid 1mg daily  -Check B12/Folate  -Smear reviewed many fragmented RBC, nucleated RBCs, hypersegmented neutrophils  -Low suspicion for MM, given FLC ratio 2  -TTE shows severe aortic stenosis  -Hemolysis most likely from aortic stenosis  -transfuse hgb <7      Please page with questions or concerns. Will Follow with you.      Dejuan Erickson M.D.  Hematology/Oncology Fellow PGY6  Pager [phone]  After 5pm, please contact on-call team.

## 2023-08-25 NOTE — DISCHARGE NOTE PROVIDER - NSDCCPCAREPLAN_GEN_ALL_CORE_FT
PRINCIPAL DISCHARGE DIAGNOSIS  Diagnosis: Acute exacerbation of bronchiectasis  Assessment and Plan of Treatment:       SECONDARY DISCHARGE DIAGNOSES  Diagnosis: Anemia  Assessment and Plan of Treatment:      PRINCIPAL DISCHARGE DIAGNOSIS  Diagnosis: S/P TAVR (transcatheter aortic valve replacement)  Assessment and Plan of Treatment:       SECONDARY DISCHARGE DIAGNOSES  Diagnosis: Anemia  Assessment and Plan of Treatment:      PRINCIPAL DISCHARGE DIAGNOSIS  Diagnosis: S/P TAVR (transcatheter aortic valve replacement)  Assessment and Plan of Treatment:

## 2023-08-25 NOTE — DISCHARGE NOTE PROVIDER - NSDCFUADDAPPT_GEN_ALL_CORE_FT
Please follow up with your pulmonologist and primary care doctor. Please follow up with your pulmonologist and primary care doctor  Your Care Navigator Nurse Practitioner will be in touch to see you in your home within a few days from discharge. The [facility] program can help ensure you understand your medications, discharge instructions and answer any questions you may have at that time. They are also a great source to address concerns during the day and may be reached at [phone].   Please follow up with your pulmonologist and primary care doctor  Your Care Navigator Nurse Practitioner will be in touch to see you in your home within a few days from discharge. The [facility] program can help ensure you understand your medications, discharge instructions and answer any questions you may have at that time. They are also a great source to address concerns during the day and may be reached at [phone].    You will have your blood drawn on Tuesday, 9/12/23 before your 8:45am appt.   you will be on Coumadin nightly- Melinda Jean NP in Cardiac Surgery Office until Dr. Efren Glover, Cardiologist can manage you Coumadin levels  you must have your blood drawn Tuesday, Sept 12 then weekly

## 2023-08-25 NOTE — PROVIDER CONTACT NOTE (CRITICAL VALUE NOTIFICATION) - TEST AND RESULT REPORTED:
+ sputum Cx
Hb-6.9
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 Rare normal respiratory val present
8/17- sputum culture showed moderate MRSA, rare pantoea agglomeras,  numerous normal respiratory val present.
sputum culture, moderate MRSA
Sputum culture positive for MRSA

## 2023-08-25 NOTE — CONSULT NOTE ADULT - SUBJECTIVE AND OBJECTIVE BOX
History of Present Illness:    73 yo Female with PMHx of bronchiectasis, tracheomalacia on chronic steroids, DM, pAfib on Eliquis, colorectal ca yrs ago s/p colostomy, recent admission for parainfluenza PNA on various antibiotics see HPI who presented to [facility] 8/11/23 with c/o worsening SOB, found to have bronchial secretions on CT, admit to medicine for exacerbation of bronchiectasis. Treated with airway clearance measures, steroid taper. Initial antibiotics: ertapenem 7-day course for hx of MDR pneumonias. Minimal improvement. Sputum culture eventually grew MRSA, completed 7 days linezolid. Patient with only moderate, not complete improvement to baseline. Patient became progressively more anemic and required 1u PRBC on 8/25 for Hgb 6.9. Heme.  TTE revealed: (08.21.23 @ 16:49) A transcatheter deployed (TAVR) (valve-in-valve) is present in the aortic position. The effective orifice area (by Continuity Equation) is 0.79 cm² (indexed area = 0.45 cm²/m²) with a Doppler velocity index of 0.31. The peak veloicty is 4.59 m/s, peak gradient is 84.3 mmHg and mean gradient is 56.0 mmHg. There is severe aortic stenosis. The highest velocity was obtained from the apical window. There is mild aortic regurgitation.  CT Surgery called for cardiac surgery evaluation.     Past Medical History  Atrial fibrillation  paroxysmal, on eliquis    Diabetes  Type 2    Hypertension    COPD (chronic obstructive pulmonary disease)    Adrenal insufficiency  Medrol daily for over 50 years    Aortic insufficiency  moderate AR on echo 5/3/2018    Pelvic fracture    Asthma    Hypertension    Tracheobronchomalacia  diagnosed 2015, s/p bronchial thermoplasty 2016 (Dr Zapien); recent bronchoscopy 6/5/2018 revealed no evidence of tracheobronchomalacia in trachea or bronchial tubes    Colorectal cancer  4/2018- last treatment , chemo and radiation    Aortic disease  leaky valve    Rectal bleeding    Seizure  x 1 1/7/18    DVT (deep venous thrombosis)  15-20 years ago, took coumadin    TIA (transient ischemic attack)  multiple, last 5 years ago - presents as right-sided weakness    Past Surgical History  History of partial hysterectomy  30 years ago - fibroids    H/O total knee replacement, bilateral  5 years ago    History of sinus surgery  multiple sinus surgeries    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    H/O pelvic surgery  5 years ago - s/p fracture    History of surgery  tracheo thermoplasty 2016    History of tracheomalacia  2015 - attempted tracheal stenting ([facility])- course complicated by obstruction, respiratory failure, multiple CPR attempts -  stent discontinued; 10/20/2016 Tracheobronchoplasty (Prolene Mesh) performed at [facility] by Dr Zapien    S/P bronchoscopy  6/5/2018 - [facility] (Dr Zapien) no evidence of tracheobronchomalacia in trachea or bronchial tubes    Rectal bleeding  exam under anesthesia ([facility]) 2/2018    MEDICATIONS  (STANDING):  albuterol/ipratropium for Nebulization 3 milliLiter(s) Nebulizer every 6 hours  apixaban 5 milliGRAM(s) Oral every 12 hours  atorvastatin 20 milliGRAM(s) Oral at bedtime  buDESOnide    Inhalation Suspension 0.5 milliGRAM(s) Inhalation two times a day  chlorhexidine 4% Liquid 1 Application(s) Topical daily  dextrose 5%. 1000 milliLiter(s) (50 mL/Hr) IV Continuous <Continuous>  dextrose 5%. 1000 milliLiter(s) (100 mL/Hr) IV Continuous <Continuous>  dextrose 50% Injectable 12.5 Gram(s) IV Push once  dextrose 50% Injectable 25 Gram(s) IV Push once  dextrose 50% Injectable 25 Gram(s) IV Push once  folic acid 1 milliGRAM(s) Oral daily  glucagon  Injectable 1 milliGRAM(s) IntraMuscular once  insulin glargine Injectable (LANTUS) 32 Unit(s) SubCutaneous at bedtime  insulin lispro (ADMELOG) corrective regimen sliding scale   SubCutaneous three times a day before meals  insulin lispro (ADMELOG) corrective regimen sliding scale   SubCutaneous at bedtime  insulin lispro Injectable (ADMELOG) 16 Unit(s) SubCutaneous three times a day with meals  methylPREDNISolone 24 milliGRAM(s) Oral daily  methylPREDNISolone   Oral   mexiletine 200 milliGRAM(s) Oral every 8 hours  nystatin    Suspension [id number] Unit(s) Swish and Swallow two times a day  pantoprazole    Tablet 40 milliGRAM(s) Oral before breakfast  polyethylene glycol 3350 17 Gram(s) Oral two times a day  senna 2 Tablet(s) Oral at bedtime  sodium chloride 7% Inhalation 4 milliLiter(s) Inhalation every 6 hours  tiotropium 2.5 MICROgram(s) Inhaler 2 Puff(s) Inhalation daily  trimethoprim  160 mG/sulfamethoxazole 800 mG 1 Tablet(s) Oral daily    MEDICATIONS  (PRN):  acetaminophen     Tablet .. 650 milliGRAM(s) Oral every 6 hours PRN Temp greater or equal to 38C (100.4F), Mild Pain (1 - 3)  aluminum hydroxide/magnesium hydroxide/simethicone Suspension 30 milliLiter(s) Oral every 4 hours PRN Dyspepsia  dextrose Oral Gel 15 Gram(s) Oral once PRN Blood Glucose LESS THAN 70 milliGRAM(s)/deciliter  diphenhydrAMINE 25 milliGRAM(s) Oral daily PRN Allergy symptoms  melatonin 3 milliGRAM(s) Oral at bedtime PRN Insomnia  ondansetron Injectable 4 milliGRAM(s) IV Push every 8 hours PRN Nausea and/or Vomiting    Antiplatelet therapy:    N/A                       Last dose/amt:    Allergies: tetanus toxoid (Short breath)  cefepime (Anaphylaxis)  penicillin (Anaphylaxis)  Avelox (Short breath; Pruritus)  Dilaudid (Short breath)  codeine (Short breath)  aspirin (Short breath)  iodine (Short breath; Swelling)  Valium (Short breath)  shellfish (Anaphylaxis)      SOCIAL HISTORY:  Smoker: [ ] Yes  [ ] No        PACK YEARS:                         WHEN QUIT?  ETOH use: [ ] Yes  [ ] No              FREQUENCY / QUANTITY:  Ilicit Drug use:  [ ] Yes  [ ] No  Occupation:  Live with:  Assist device use:    Relevant Family History  FAMILY HISTORY:  Family history of asthma    Family history of breast cancer (Sibling)    Family history of diabetes mellitus type II    Review of Systems  GENERAL:  Fevers[] chills[] sweats[] fatigue[] weight loss[] weight gain []                                        NEURO:  parathesias[] seizures []  syncope []  confusion []                                                                                  EYES: glasses[]  blurry vision[]  discharge[] pain[] glaucoma []                                                                            ENMT:  difficulty hearing []  vertigo[]  dysphagia[] epistaxis[] recent dental work []                                      CV:  chest pain[] palpitations[] KRAMER [] diaphoresis [] edema[]                                                                                             RESPIRATORY:  wheezing[] SOB[] cough [] sputum[] hemoptysis[]                                                                    GI:  nausea[]  vomiting []  diarrhea[] constipation [] melena []                                                                        : hematuria[ ]  dysuria[ ] urgency[] incontinence[]                                                                                              MUSCULOSKELETAL  arthritis[ ]  joint swelling [ ] muscle weakness [ ]                                                                  SKIN/BREAST:  rash[ ] itching [ ]  hair loss[ ] masses[ ]                                                                                                PSYCH:  dementia [ ] depression [ ] anxiety[ ]                                                                                                                  HEME/LYMPH:  bruises easily[ ] enlarged lymph nodes[ ] tender lymph nodes[ ]                                                 ENDOCRINE:  cold intolerance[ ] heat intolerance[ ] polydipsia[ ]                                                                              PHYSICAL EXAM  Vital Signs Last 24 Hrs  T(C): 36.7 (25 Aug 2023 12:32), Max: 37.1 (24 Aug 2023 20:01)  T(F): 98.1 (25 Aug 2023 12:32), Max: 98.7 (24 Aug 2023 20:01)  HR: 83 (25 Aug 2023 13:59) (83 - 87)  BP: 117/70 (25 Aug 2023 13:59) (97/58 - 118/75)  BP(mean): --  RR: 18 (25 Aug 2023 12:32) (18 - 18)  SpO2: 100% (25 Aug 2023 12:32) (96% - 100%)    Parameters below as of 25 Aug 2023 12:32  Patient On (Oxygen Delivery Method): room air    General: Well nourished, well developed, no acute distress.                                                         Neuro: Normal exam oriented to person/place & time with no focal motor or sensory  deficits.                    Eyes: Normal exam of conjunctiva & lids, pupils equally reactive.   ENT: Normal exam of nasal/oral mucosa with absence of cyanosis.   Neck: Normal exam of jugular veins, trachea & thyroid.   Chest: Normal lung exam with good air movement absence of wheezes, rales, or rhonchi:                                                                          CV:  Auscultation: normal [ ] S3[ ] S4[ ] Irregular [ ] Rub[ ] Clicks[ ]  Murmurs none:[ ]systolic [ ]  diastolic [ ] holosystolic [ ]  Carotids: No Bruits[ ] Other____________ Abdominal Aorta: normal [ ] nonpalpable[ ]                                                                         GI: Normal exam of abdomen, liver & spleen with no noted masses or tenderness.  (+) BS X 4 Quadrants, Nontender / Non Distended.                                                                                             Extremities: Normal no evidence of cyanosis or deformity Edema: none[ ]trace[ ]1+[ ]2+[ ]3+[ ]4+[ ]  Lower Extremity Pulses: Right[ ] Left[ ]Varicosities[ ]  SKIN : Normal exam to inspection & palpation.                                                           LABS:                        6.9    12.96 )-----------( 216      ( 25 Aug 2023 11:38 )             24.0     08-25    139  |  103  |  23  ----------------------------<  269<H>  4.8   |  23  |  0.64    Ca    8.7      25 Aug 2023 11:38  Phos  2.5     08-25  Mg     1.9     08-25    TPro  5.5<L>  /  Alb  3.4  /  TBili  0.8  /  DBili  x   /  AST  52<H>  /  ALT  25  /  AlkPhos  65  08-25      Urinalysis Basic - ( 25 Aug 2023 11:38 )    Color: x / Appearance: x / SG: x / pH: x  Gluc: 269 mg/dL / Ketone: x  / Bili: x / Urobili: x   Blood: x / Protein: x / Nitrite: x   Leuk Esterase: x / RBC: x / WBC x   Sq Epi: x / Non Sq Epi: x / Bacteria: x    TTE / SAFIA: < from: TTE Congenital Anomalies W or WO Ultrasound Enhancing Agent (08.21.23 @ 16:49) >   1. Left ventricular systolic function is hyperdynamic with an ejection fraction visually estimated at 75 %.   2. Severe aortic stenosis.    FINDINGS:     Left Ventricle:  Left ventricular systolic function is hyperdynamic with an ejection fraction visually estimated at 75%.     Aortic Valve:  A transcatheter deployed (TAVR) (valve-in-valve) is present in the aortic position. The effective orifice area (by Continuity Equation) is 0.79 cm² (indexed area = 0.45 cm²/m²) with a Doppler velocity index of 0.31. The peak veloicty is 4.59 m/s, peak gradient is 84.3 mmHg and mean gradient is 56.0 mmHg. There is severe aortic stenosis. The highest velocity was obtained from the apical window. There is mild aortic regurgitation.     Mitral Valve:  There is calcification of the mitral valve annulus.    CT Abdomen and Pelvis No Cont (08.23.23 @ 22:52) >  IMPRESSION:  No evidence of hemoperitoneum, retroperitoneal hemorrhage or abdominal   wall hematoma.    Moderate to large amount stool throughout the colon with nonspecific   collapse of the distal colon just proximal to the colostomy.  No obvious   mass or inflammatory changes at site of caliber change in the distal   colon.  Constipation and/or partial large bowel obstruction should be   excluded clinically.    Re-demonstration of tree-in-bud nodules in the right lung base, unchanged   from 08/01/2023.  Infectious etiology should be excluded clinically.    Re-demonstration of small complex cystic left renal lesions.    Re-demonstration of multiple pancreatic cysts.    Re-demonstration of chronic dissection flap in the distal descending   thoracic aorta and abdominal aorta.               History of Present Illness:    73 yo Female with PMHx of bronchiectasis, tracheomalacia on chronic steroids, DM, pAfib on Eliquis, colorectal ca yrs ago s/p colostomy, recent admission for parainfluenza PNA on various antibiotics see HPI who presented to [facility] 8/11/23 with c/o worsening SOB, found to have bronchial secretions on CT, admit to medicine for exacerbation of bronchiectasis. Treated with airway clearance measures, steroid taper. Initial antibiotics: ertapenem 7-day course for hx of MDR pneumonias. Minimal improvement. Sputum culture eventually grew MRSA, completed 7 days linezolid. Patient with only moderate, not complete improvement to baseline. Patient became progressively more anemic and required 1u PRBC on 8/25 for Hgb 6.9. Heme.  TTE revealed: (08.21.23 @ 16:49) A transcatheter deployed (TAVR) (valve-in-valve) is present in the aortic position. The effective orifice area (by Continuity Equation) is 0.79 cm² (indexed area = 0.45 cm²/m²) with a Doppler velocity index of 0.31. The peak veloicty is 4.59 m/s, peak gradient is 84.3 mmHg and mean gradient is 56.0 mmHg. There is severe aortic stenosis. The highest velocity was obtained from the apical window. There is mild aortic regurgitation.  CT Surgery called for cardiac surgery evaluation.     Past Medical History  Atrial fibrillation  paroxysmal, on eliquis    Diabetes  Type 2    Hypertension    COPD (chronic obstructive pulmonary disease)    Adrenal insufficiency  Medrol daily for over 50 years    Aortic insufficiency  moderate AR on echo 5/3/2018    Pelvic fracture    Asthma    Hypertension    Tracheobronchomalacia  diagnosed 2015, s/p bronchial thermoplasty 2016 (Dr Zapien); recent bronchoscopy 6/5/2018 revealed no evidence of tracheobronchomalacia in trachea or bronchial tubes    Colorectal cancer  4/2018- last treatment , chemo and radiation    Aortic disease  leaky valve    Rectal bleeding    Seizure  x 1 1/7/18    DVT (deep venous thrombosis)  15-20 years ago, took coumadin    TIA (transient ischemic attack)  multiple, last 5 years ago - presents as right-sided weakness    Past Surgical History  History of partial hysterectomy  30 years ago - fibroids    H/O total knee replacement, bilateral  5 years ago    History of sinus surgery  multiple sinus surgeries    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    H/O pelvic surgery  5 years ago - s/p fracture    History of surgery  tracheo thermoplasty 2016    History of tracheomalacia  2015 - attempted tracheal stenting ([facility])- course complicated by obstruction, respiratory failure, multiple CPR attempts -  stent discontinued; 10/20/2016 Tracheobronchoplasty (Prolene Mesh) performed at [facility] by Dr Zapien    S/P bronchoscopy  6/5/2018 - [facility] (Dr Zapien) no evidence of tracheobronchomalacia in trachea or bronchial tubes    Rectal bleeding  exam under anesthesia ([facility]) 2/2018    MEDICATIONS  (STANDING):  albuterol/ipratropium for Nebulization 3 milliLiter(s) Nebulizer every 6 hours  apixaban 5 milliGRAM(s) Oral every 12 hours  atorvastatin 20 milliGRAM(s) Oral at bedtime  buDESOnide    Inhalation Suspension 0.5 milliGRAM(s) Inhalation two times a day  chlorhexidine 4% Liquid 1 Application(s) Topical daily  dextrose 5%. 1000 milliLiter(s) (50 mL/Hr) IV Continuous <Continuous>  dextrose 5%. 1000 milliLiter(s) (100 mL/Hr) IV Continuous <Continuous>  dextrose 50% Injectable 12.5 Gram(s) IV Push once  dextrose 50% Injectable 25 Gram(s) IV Push once  dextrose 50% Injectable 25 Gram(s) IV Push once  folic acid 1 milliGRAM(s) Oral daily  glucagon  Injectable 1 milliGRAM(s) IntraMuscular once  insulin glargine Injectable (LANTUS) 32 Unit(s) SubCutaneous at bedtime  insulin lispro (ADMELOG) corrective regimen sliding scale   SubCutaneous three times a day before meals  insulin lispro (ADMELOG) corrective regimen sliding scale   SubCutaneous at bedtime  insulin lispro Injectable (ADMELOG) 16 Unit(s) SubCutaneous three times a day with meals  methylPREDNISolone 24 milliGRAM(s) Oral daily  methylPREDNISolone   Oral   mexiletine 200 milliGRAM(s) Oral every 8 hours  nystatin Suspension [id number] Unit(s) Swish and Swallow two times a day  pantoprazole    Tablet 40 milliGRAM(s) Oral before breakfast  polyethylene glycol 3350 17 Gram(s) Oral two times a day  senna 2 Tablet(s) Oral at bedtime  sodium chloride 7% Inhalation 4 milliLiter(s) Inhalation every 6 hours  tiotropium 2.5 MICROgram(s) Inhaler 2 Puff(s) Inhalation daily  trimethoprim  160 mG/sulfamethoxazole 800 mG 1 Tablet(s) Oral daily    MEDICATIONS  (PRN):  acetaminophen     Tablet .. 650 milliGRAM(s) Oral every 6 hours PRN Temp greater or equal to 38C (100.4F), Mild Pain (1 - 3)  aluminum hydroxide/magnesium hydroxide/simethicone Suspension 30 milliLiter(s) Oral every 4 hours PRN Dyspepsia  dextrose Oral Gel 15 Gram(s) Oral once PRN Blood Glucose LESS THAN 70 milliGRAM(s)/deciliter  diphenhydrAMINE 25 milliGRAM(s) Oral daily PRN Allergy symptoms  melatonin 3 milliGRAM(s) Oral at bedtime PRN Insomnia  ondansetron Injectable 4 milliGRAM(s) IV Push every 8 hours PRN Nausea and/or Vomiting    Antiplatelet therapy:    N/A                       Last dose/amt:    Allergies: tetanus toxoid (Short breath)  cefepime (Anaphylaxis)  penicillin (Anaphylaxis)  Avelox (Short breath; Pruritus)  Dilaudid (Short breath)  codeine (Short breath)  aspirin (Short breath)  iodine (Short breath; Swelling)  Valium (Short breath)  shellfish (Anaphylaxis)      SOCIAL HISTORY:  Smoker: [ ] Yes  [x ] No        PACK YEARS:                         WHEN QUIT?  ETOH use: [ ] Yes  [x] No              FREQUENCY / QUANTITY:  Ilicit Drug use:  [ ] Yes  [x ] No  Occupation: Retired    Live with: Alone   Assist device use: Denies     Relevant Family History  FAMILY HISTORY:  Family history of asthma    Family history of breast cancer (Sibling)    Family history of diabetes mellitus type II    Review of Systems  GENERAL:  Fevers[] chills[] sweats[] fatigue[] weight loss[] weight gain []                                        NEURO:  parathesias[] seizures []  syncope []  confusion []                                                                                  EYES: glasses[]  blurry vision[]  discharge[] pain[] glaucoma []                                                                            ENMT:  difficulty hearing []  vertigo[]  dysphagia[] epistaxis[] recent dental work []                                      CV:  chest pain[] palpitations[] KRAMER [X] diaphoresis [] edema[]                                                                                             RESPIRATORY:  wheezing[] SOB [X] cough [] sputum[] hemoptysis[]                                                                    GI:  nausea[]  vomiting []  diarrhea[] constipation [] melena []                                                                        : hematuria[ ]  dysuria[ ] urgency[] incontinence[]                                                                                              MUSCULOSKELETAL  arthritis[ ]  joint swelling [ ] muscle weakness [ ]                                                                  SKIN/BREAST:  rash[ ] itching [ ]  hair loss[ ] masses[ ]                                                                                                PSYCH:  dementia [ ] depression [ ] anxiety[ ]                                                                                                                  HEME/LYMPH:  bruises easily[ ] enlarged lymph nodes[ ] tender lymph nodes[ ]                                                 ENDOCRINE:  cold intolerance[ ] heat intolerance[ ] polydipsia[ ]                                                                              PHYSICAL EXAM  Vital Signs Last 24 Hrs  T(C): 36.7 (25 Aug 2023 12:32), Max: 37.1 (24 Aug 2023 20:01)  T(F): 98.1 (25 Aug 2023 12:32), Max: 98.7 (24 Aug 2023 20:01)  HR: 83 (25 Aug 2023 13:59) (83 - 87)  BP: 117/70 (25 Aug 2023 13:59) (97/58 - 118/75)  BP(mean): --  RR: 18 (25 Aug 2023 12:32) (18 - 18)  SpO2: 100% (25 Aug 2023 12:32) (96% - 100%)    Parameters below as of 25 Aug 2023 12:32  Patient On (Oxygen Delivery Method): room air    General: Well nourished, well developed, no acute distress.                                                         Neuro: Normal exam oriented to person/place & time with no focal motor or sensory  deficits.                    Eyes: Normal exam of conjunctiva & lids, pupils equally reactive.   ENT: Normal exam of nasal/oral mucosa with absence of cyanosis.   Neck: Normal exam of jugular veins, trachea & thyroid.   Chest: Scattered rhonchi B/L with expiratory wheeze                                                                   CV:  Auscultation: normal [ ] S3[ ] S4[ ] Irregular [X ] Rub[ ] Clicks[ ]  Murmurs none:[ ]systolic [X ]  diastolic [ ] holosystolic [ ]  Carotids: No Bruits[X ] Other____________ Abdominal Aorta: normal [X] nonpalpable[X ]                                                                         GI: Normal exam of abdomen, liver & spleen with no noted masses or tenderness.  (+) BS X 4 Quadrants, Nontender / Non Distended.                                                                                             Extremities: Normal no evidence of cyanosis or deformity Edema: none[ ]trace[ ]1+[X ]2+[ ]3+[ ]4+[ ]  Lower Extremity Pulses: Right[+1 ] Left[+1 ]Varicosities[-]  SKIN : Normal exam to inspection & palpation.                                                           LABS:                        6.9    12.96 )-----------( 216      ( 25 Aug 2023 11:38 )             24.0     08-25    139  |  103  |  23  ----------------------------<  269<H>  4.8   |  23  |  0.64    Ca    8.7      25 Aug 2023 11:38  Phos  2.5     08-25  Mg     1.9     08-25    TPro  5.5<L>  /  Alb  3.4  /  TBili  0.8  /  DBili  x   /  AST  52<H>  /  ALT  25  /  AlkPhos  65  08-25      Urinalysis Basic - ( 25 Aug 2023 11:38 )    Color: x / Appearance: x / SG: x / pH: x  Gluc: 269 mg/dL / Ketone: x  / Bili: x / Urobili: x   Blood: x / Protein: x / Nitrite: x   Leuk Esterase: x / RBC: x / WBC x   Sq Epi: x / Non Sq Epi: x / Bacteria: x    TTE / SAFIA: < from: TTE Congenital Anomalies W or WO Ultrasound Enhancing Agent (08.21.23 @ 16:49) >   1. Left ventricular systolic function is hyperdynamic with an ejection fraction visually estimated at 75 %.   2. Severe aortic stenosis.    FINDINGS:     Left Ventricle:  Left ventricular systolic function is hyperdynamic with an ejection fraction visually estimated at 75%.     Aortic Valve:  A transcatheter deployed (TAVR) (valve-in-valve) is present in the aortic position. The effective orifice area (by Continuity Equation) is 0.79 cm² (indexed area = 0.45 cm²/m²) with a Doppler velocity index of 0.31. The peak veloicty is 4.59 m/s, peak gradient is 84.3 mmHg and mean gradient is 56.0 mmHg. There is severe aortic stenosis. The highest velocity was obtained from the apical window. There is mild aortic regurgitation.     Mitral Valve:  There is calcification of the mitral valve annulus.    CT Abdomen and Pelvis No Cont (08.23.23 @ 22:52) >  IMPRESSION:  No evidence of hemoperitoneum, retroperitoneal hemorrhage or abdominal   wall hematoma.    Moderate to large amount stool throughout the colon with nonspecific   collapse of the distal colon just proximal to the colostomy.  No obvious   mass or inflammatory changes at site of caliber change in the distal   colon.  Constipation and/or partial large bowel obstruction should be   excluded clinically.    Re-demonstration of tree-in-bud nodules in the right lung base, unchanged   from 08/01/2023.  Infectious etiology should be excluded clinically.    Re-demonstration of small complex cystic left renal lesions.    Re-demonstration of multiple pancreatic cysts.    Re-demonstration of chronic dissection flap in the distal descending   thoracic aorta and abdominal aorta.

## 2023-08-25 NOTE — PROGRESS NOTE ADULT - SUBJECTIVE AND OBJECTIVE BOX
[facility] DIVISION OF INFECTIOUS DISEASES  GISSELL Uriostegui Y. Patel, S. Shah, G. Terrell  [phone]  ([phone] - weekdays after 5pm and weekends)    Name: RAYRAY RODRIGUEZ  Age/Gender: 72y Female  MRN: [id number]    Interval History:  Patient seen and examined this morning.   Continues to have cough, not bringing up much.   No fevers, pain or new complaints noted.  Notes reviewed  No concerning overnight events  Afebrile     Allergies: tetanus toxoid (Short breath)  cefepime (Anaphylaxis)  penicillin (Anaphylaxis)  Avelox (Short breath; Pruritus)  Dilaudid (Short breath)  codeine (Short breath)  aspirin (Short breath)  iodine (Short breath; Swelling)  Valium (Short breath)  shellfish (Anaphylaxis)      Objective:  Vitals:   T(F): 98.3 (08-25-23 @ 05:38), Max: 98.7 (08-24-23 @ 20:01)  HR: 83 (08-25-23 @ 05:38) (82 - 87)  BP: 110/69 (08-25-23 @ 05:38) (97/58 - 115/75)  RR: 18 (08-25-23 @ 05:38) (18 - 20)  SpO2: 100% (08-25-23 @ 05:38) (96% - 100%)  Physical Examination:  General: no acute distress  HEENT: NC/AT, anicteric, neck supple  Respiratory: no acc muscle use, breathing comfortably  Cardiovascular: S1 and S2 present  Gastrointestinal: normal appearing, nondistended  Extremities: no edema, no cyanosis  Skin: no visible rash    Laboratory Studies:  CBC:                       7.4    11.97 )-----------( 217      ( 24 Aug 2023 12:04 )             25.9     WBC Trend:  11.97 08-24-23 @ 12:04  13.41 08-23-23 @ 07:16  11.69 08-22-23 @ 06:39  12.71 08-21-23 @ 16:38  12.04 08-21-23 @ 07:26  13.76 08-20-23 @ 06:30  14.13 08-19-23 @ 15:25    CMP: 08-24    138  |  105  |  22  ----------------------------<  209<H>  4.6   |  22  |  0.73    Ca    8.8      24 Aug 2023 12:04  Phos  3.0     08-24  Mg     2.0     08-24    TPro  5.5<L>  /  Alb  3.4  /  TBili  0.8  /  DBili  x   /  AST  52<H>  /  ALT  26  /  AlkPhos  64  08-24    Creatinine: 0.73 mg/dL (08-24-23 @ 12:04)  Creatinine: 0.71 mg/dL (08-23-23 @ 07:12)  Creatinine: 0.76 mg/dL (08-22-23 @ 06:38)  Creatinine: 0.70 mg/dL (08-21-23 @ 07:29)  Creatinine: 0.75 mg/dL (08-20-23 @ 06:30)  Creatinine: 0.65 mg/dL (08-19-23 @ 15:25)      LIVER FUNCTIONS - ( 24 Aug 2023 12:04 )  Alb: 3.4 g/dL / Pro: 5.5 g/dL / ALK PHOS: 64 U/L / ALT: 26 U/L / AST: 52 U/L / GGT: x           Microbiology: reviewed   Culture - Sputum, Cystic Fibrosis (collected 08-18-23 @ 23:27)  Source: .Sputum Sputum  Gram Stain (08-19-23 @ 03:39):    No polymorphonuclear leukocytes per low power field    No Squamous epithelial cells per low power field    No organisms seen per oil power field  Final Report (08-22-23 @ 15:32):    Rare Methicillin Resistant Staphylococcus aureus    Rare Normal Respiratory Jessica present  Organism: Methicillin resistant Staphylococcus aureus (08-22-23 @ 15:32)  Organism: Methicillin resistant Staphylococcus aureus (08-22-23 @ 15:32)      Method Type: GARRETT      -  Ampicillin/Sulbactam: R <=8/4      -  Cefazolin: R >16      -  Clindamycin: S <=0.25      -  Erythromycin: R >4      -  Gentamicin: S <=1 Should not be used as monotherapy      -  Linezolid: S 2      -  Oxacillin: R >2      -  Penicillin: R >8      -  Rifampin: S <=1 Should not be used as monotherapy      -  Tetracycline: S <=1      -  Trimethoprim/Sulfamethoxazole: R >2/38      -  Vancomycin: S 2    Culture - Sputum (collected 08-18-23 @ 10:43)  Source: .Sputum Sputum  Gram Stain (08-18-23 @ 20:40):    No polymorphonuclear leukocytes per low power field    Rare Squamous epithelial cells per low power field    Moderate Gram positive cocci in pairs per oil power field    Few Gram Negative Rods per oil power field  Final Report (08-20-23 @ 19:06):    Normal Respiratory Jessica present    Culture - Blood (collected 08-17-23 @ 09:35)  Source: .Blood Blood-Peripheral  Final Report (08-22-23 @ 18:00):    No growth at 5 days    Culture - Blood (collected 08-17-23 @ 09:30)  Source: .Blood Blood-Peripheral  Final Report (08-22-23 @ 18:00):    No growth at 5 days    Culture - Sputum, Cystic Fibrosis (collected 08-17-23 @ 01:18)  Source: .Sputum Sputum  Gram Stain (08-17-23 @ 10:15):    Rare Squamous epithelial cells per low power field    No polymorphonuclear leukocytes per low power field    Rare Gram positive cocci in pairs per oil power field  Final Report (08-21-23 @ 09:41):    Moderate Methicillin Resistant Staphylococcus aureus    Rare Pantoea agglomerans    Numerous Normal Respiratory Jessica present  Organism: Methicillin resistant Staphylococcus aureus  Pantoea agglomerans (Previousley Enterobacter) (08-21-23 @ 09:41)  Organism: Pantoea agglomerans (Previousley Enterobacter) (08-21-23 @ 09:41)      Method Type: GARRETT      -  Amikacin: S <=16      -  Amoxicillin/Clavulanic Acid: S <=8/4      -  Ampicillin: S <=8 These ampicillin results predict results for amoxicillin      -  Ampicillin/Sulbactam: S <=4/2 Enterobacter, Klebsiella aerogenes, Citrobacter, and Serratia may develop resistance during prolonged therapy (3-4 days)      -  Aztreonam: S <=4      -  Cefazolin: S <=2 Enterobacter, Klebsiella aerogenes, Citrobacter, and Serratia may develop resistance during prolonged therapy (3-4 days)      -  Cefepime: S <=2      -  Cefoxitin: S <=8      -  Ceftriaxone: S <=1 Enterobacter, Klebsiella aerogenes, Citrobacter, and Serratia may develop resistance during prolonged therapy      -  Ciprofloxacin: S <=0.25      -  Ertapenem: S <=0.5      -  Gentamicin: S <=2      -  Levofloxacin: S <=0.5      -  Meropenem: S <=1      -  Piperacillin/Tazobactam: S <=8      -  Tobramycin: S <=2      -  Trimethoprim/Sulfamethoxazole: S <=0.5/9.5  Organism: Methicillin resistant Staphylococcus aureus (08-21-23 @ 09:41)      Method Type: GARRETT      -  Ampicillin/Sulbactam: R <=8/4      -  Cefazolin: R >16      -  Clindamycin: S <=0.25      -  Erythromycin: R >4      -  Gentamicin: S <=1 Should not be used as monotherapy      -  Linezolid: S 2      -  Oxacillin: R >2      -  Penicillin: R >8      -  Rifampin: S <=1 Should not be used as monotherapy      -  Tetracycline: S 2      -  Trimethoprim/Sulfamethoxazole: R >2/38      -  Vancomycin: S 2    Culture - Sputum (collected 08-15-23 @ 12:49)  Source: .Sputum Sputum  Gram Stain (08-15-23 @ 21:07):    Rare Squamous epithelial cells per low power field    Rare polymorphonuclear leukocytes per low power field    Rare Yeast like cells per oil power field  Final Report (08-17-23 @ 18:08):    Normal Respiratory Jessica present    Culture - Acid Fast - Sputum w/Smear (collected 08-14-23 @ 13:11)  Source: .Sputum Sputum  Preliminary Report (08-23-23 @ 15:08):    No acid-fast bacilli isolated at 1 week. ****Acid-fast cultures are held    for 6 weeks.****    Culture - Blood (collected 08-11-23 @ 16:00)  Source: .Blood Blood-Peripheral  Final Report (08-16-23 @ 20:00):    No growth at 5 days    Culture - Blood (collected 08-11-23 @ 15:15)  Source: .Blood Blood-Peripheral  Final Report (08-16-23 @ 20:00):    No growth at 5 days    SARS-CoV-2 Result: NotDetec (11 Aug 2023 16:06)    Radiology: reviewed     Medications:  acetaminophen     Tablet .. 650 milliGRAM(s) Oral every 6 hours PRN  albuterol/ipratropium for Nebulization 3 milliLiter(s) Nebulizer every 6 hours  aluminum hydroxide/magnesium hydroxide/simethicone Suspension 30 milliLiter(s) Oral every 4 hours PRN  apixaban 5 milliGRAM(s) Oral every 12 hours  atorvastatin 20 milliGRAM(s) Oral at bedtime  buDESOnide    Inhalation Suspension 0.5 milliGRAM(s) Inhalation two times a day  chlorhexidine 4% Liquid 1 Application(s) Topical daily  dextrose 5%. 1000 milliLiter(s) IV Continuous <Continuous>  dextrose 5%. 1000 milliLiter(s) IV Continuous <Continuous>  dextrose 50% Injectable 25 Gram(s) IV Push once  dextrose 50% Injectable 12.5 Gram(s) IV Push once  dextrose 50% Injectable 25 Gram(s) IV Push once  dextrose Oral Gel 15 Gram(s) Oral once PRN  diphenhydrAMINE 25 milliGRAM(s) Oral daily PRN  glucagon  Injectable 1 milliGRAM(s) IntraMuscular once  insulin glargine Injectable (LANTUS) 32 Unit(s) SubCutaneous at bedtime  insulin lispro (ADMELOG) corrective regimen sliding scale   SubCutaneous at bedtime  insulin lispro (ADMELOG) corrective regimen sliding scale   SubCutaneous three times a day before meals  insulin lispro Injectable (ADMELOG) 16 Unit(s) SubCutaneous three times a day with meals  melatonin 3 milliGRAM(s) Oral at bedtime PRN  methylPREDNISolone 24 milliGRAM(s) Oral daily  methylPREDNISolone   Oral   mexiletine 200 milliGRAM(s) Oral every 8 hours  nystatin    Suspension 592652 Unit(s) Swish and Swallow two times a day  ondansetron Injectable 4 milliGRAM(s) IV Push every 8 hours PRN  pantoprazole    Tablet 40 milliGRAM(s) Oral before breakfast  polyethylene glycol 3350 17 Gram(s) Oral two times a day  senna 2 Tablet(s) Oral at bedtime  sodium chloride 7% Inhalation 4 milliLiter(s) Inhalation every 6 hours  tiotropium 2.5 MICROgram(s) Inhaler 2 Puff(s) Inhalation daily  trimethoprim  160 mG/sulfamethoxazole 800 mG 1 Tablet(s) Oral daily    Current Antimicrobials:  nystatin    Suspension 536033 Unit(s) Swish and Swallow two times a day  trimethoprim  160 mG/sulfamethoxazole 800 mG 1 Tablet(s) Oral daily    Prior/Completed Antimicrobials:  ertapenem  IVPB  ertapenem  IVPB  linezolid    Tablet

## 2023-08-25 NOTE — DISCHARGE NOTE PROVIDER - HOSPITAL COURSE
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 P/w SOB, found to have bronchial secretions on CT, admit to medicine for exacerbation of bronchiectasis. Treated with airway clearance measures, steroid taper. Initial antibiotics: ertapenem 7-day course for hx of MDR pneumonias. Minimal improvement. Sputum culture eventually grew MRSA, completed 7 days linezolid. Patient with only moderate, not complete improvement to baseline. Patient became progressively more anemic, possibly hemolytic from TAVR, required 1u PRBC on 8/25 for Hgb 6.9. Heme, cardiothoracic surgery consulted. Deconditioning, planning for dc to [facility]. 72 F w/h/o uncontrolled T2DM (A1C 9.4%) on basal/bolus insulin PTA. Unknown DM complications. Also COPD, secondary adrenal Insufficiency on chronic steroids, colorectal cancer s/p resection (colostomy bag), Chronic A fib on Eliquis, and tracheomalacia and multiple intubations, type A aortic dissection s/p aortic dissection repair on 9/07/22, sepsis. Pt well known to this provider from prior admissions. Here with SOB> treated for PNA and on Prednisolone  16mg/day for AI.  Also found to have anemia and severe aortic stenosis> hematology and ct surgery following pt.  Endocrine consulted for assistance with uncontrolled DM. S/p CTSx 9/6 with BG above goal, noted patient has not received lantus in over 24 hours.     9/7 s/p TAVR #23 magaly, no conduction abnormality timi procedure.  Freestyle Luis E 3 if pt going home. Needs keshia on phone but pt states she wants daughter to do it.    9/8 VSS awaiting INR - echo this am- will d/c home Sunday if INR up.    9/9 VSS; INR 1.46 --> coumadin 5 mg PO tonight  Patient medically cleared for discharge home today per Dr. Leahy, but per patient daughter will not be able to pick her up until tomorrow.  Continue with current medication regimen.    9/10 VSS Disposition: Home with [facility] in AM.  on Coumadin daily

## 2023-08-25 NOTE — DISCHARGE NOTE PROVIDER - NSDCCAREPROVSEEN_GEN_ALL_CORE_FT
Khan, Mohsinuzzaman  [facility] HS 6 Khan, Mohsinuzzaman  [facility] 6  Pupjeffrey, Alejandro KOLB   Alejandro Savage

## 2023-08-25 NOTE — PROGRESS NOTE ADULT - ASSESSMENT
71 yo PMHx  asthma on chronic steroids, bronchiectasis s/p surgery, allergic rhinitis,  recurrent PNA, PND, tracheomalacia s/p tracheoplasty, ESBL proteus infections (sputum),  diabetes, paroxysmal A-fib on Eliquis, colorectal cancer many years ago status post colostomy presenting with concern for shortness of breath. Prior issues with ESBL/Proteus/yeast in sputum culture and was treated with ertapenem/Benadryl/vancomycin/aztreonam and methylprednisolone.   She was discharged home with a midline and completed a course of antibiotics ertapenem (last dose 06/25) PT now readmitted with severe dyspnea and reports that having had asthma for 61 years she feels like she needs an antibiotic. Reports not tolerating meropenem. Patient follows with Dr Allison who knows this complicated pt very well  Noted  pt has PCN, cefepime allergies    RECOMMENDATIONS  -no evidence of airspace disease so suspect likely Bronchiectasis exacerbation  -recent sputums with ESBL proteus so having tolerated ertapenem and was restarted   -Repeat CXR with no pneumonia   -Fungitell negative     -8/15 Sputum culture with normal resp val   -8/14 Sputum AFB smear negative -f/u culture to rule out DIEUDONNE  -8/17 CF sputum culture with mod Staph aureus-MRSA, Pantoea (previously Enterobacter) and normal resp val   - sensitivities noted; recently completed course of ertapenem 8/12-8/18, now on linezolid     -leukocytosis trend noted -overall stable - suspect likely reactive in setting of steroids   - no new complaints or focal findings on exam; remains afebrile    Pulmonary following - changed to po steroid 8/19  Follow AFB, fungal sputum cultures (NGTD)  S/p linezolid day (8/18-8/24)  S/p ertapenem (8/12-8/18)  Continue off antibiotics   Supportive care, chest PT, neb treatments   Monitor temps/CBC    Over the weekend Dr. Richar Tejada will be covering for our group.  If you have any questions, concerns or new micro data, please reach out to them at [phone].    Tello Fernandez M.D.  [facility], Division of Infectious Diseases  [phone]  After 5pm on weekdays and all day on weekends - please call [phone]

## 2023-08-25 NOTE — PROGRESS NOTE ADULT - ASSESSMENT
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 P/w SOB, found to have bronchial secretions on CT, admit to medicine for exacerbation of bronchiectasis. Sputum culture growing MRSA, completed 7 days linezolid. Anemic, possibly hemolytic from TAVR. Deconditioning, planning for dc to [place]

## 2023-08-25 NOTE — DISCHARGE NOTE PROVIDER - CARE PROVIDERS DIRECT ADDRESSES
,[email or web address],DirectAddress_Unknown ,[email or web address]_Unknown,[email or web address],DirectAddress_Unknown

## 2023-08-26 LAB
ALBUMIN SERPL ELPH-MCNC: 3.1 G/DL — LOW (ref 3.3–5)
ALP SERPL-CCNC: 59 U/L — SIGNIFICANT CHANGE UP (ref 40–120)
ALT FLD-CCNC: 20 U/L — SIGNIFICANT CHANGE UP (ref 10–45)
ANION GAP SERPL CALC-SCNC: 11 MMOL/L — SIGNIFICANT CHANGE UP (ref 5–17)
AST SERPL-CCNC: 45 U/L — HIGH (ref 10–40)
BILIRUB SERPL-MCNC: 0.8 MG/DL — SIGNIFICANT CHANGE UP (ref 0.2–1.2)
BUN SERPL-MCNC: 17 MG/DL — SIGNIFICANT CHANGE UP (ref 7–23)
CALCIUM SERPL-MCNC: 8.3 MG/DL — LOW (ref 8.4–10.5)
CHLORIDE SERPL-SCNC: 105 MMOL/L — SIGNIFICANT CHANGE UP (ref 96–108)
CO2 SERPL-SCNC: 25 MMOL/L — SIGNIFICANT CHANGE UP (ref 22–31)
CREAT SERPL-MCNC: 0.67 MG/DL — SIGNIFICANT CHANGE UP (ref 0.5–1.3)
EGFR: 92 ML/MIN/1.73M2 — SIGNIFICANT CHANGE UP
FOLATE SERPL-MCNC: 12.3 NG/ML — SIGNIFICANT CHANGE UP
GLUCOSE BLDC GLUCOMTR-MCNC: 112 MG/DL — HIGH (ref 70–99)
GLUCOSE BLDC GLUCOMTR-MCNC: 126 MG/DL — HIGH (ref 70–99)
GLUCOSE BLDC GLUCOMTR-MCNC: 155 MG/DL — HIGH (ref 70–99)
GLUCOSE BLDC GLUCOMTR-MCNC: 219 MG/DL — HIGH (ref 70–99)
GLUCOSE BLDC GLUCOMTR-MCNC: 237 MG/DL — HIGH (ref 70–99)
GLUCOSE BLDC GLUCOMTR-MCNC: 57 MG/DL — LOW (ref 70–99)
GLUCOSE BLDC GLUCOMTR-MCNC: 64 MG/DL — LOW (ref 70–99)
GLUCOSE BLDC GLUCOMTR-MCNC: 95 MG/DL — SIGNIFICANT CHANGE UP (ref 70–99)
GLUCOSE SERPL-MCNC: 132 MG/DL — HIGH (ref 70–99)
HCT VFR BLD CALC: 25.7 % — LOW (ref 34.5–45)
HGB BLD-MCNC: 7.7 G/DL — LOW (ref 11.5–15.5)
MAGNESIUM SERPL-MCNC: 2.1 MG/DL — SIGNIFICANT CHANGE UP (ref 1.6–2.6)
MCHC RBC-ENTMCNC: 23.3 PG — LOW (ref 27–34)
MCHC RBC-ENTMCNC: 30 GM/DL — LOW (ref 32–36)
MCV RBC AUTO: 77.6 FL — LOW (ref 80–100)
NRBC # BLD: 7 /100 WBCS — HIGH (ref 0–0)
PHOSPHATE SERPL-MCNC: 3 MG/DL — SIGNIFICANT CHANGE UP (ref 2.5–4.5)
PLATELET # BLD AUTO: 188 K/UL — SIGNIFICANT CHANGE UP (ref 150–400)
POTASSIUM SERPL-MCNC: 4.1 MMOL/L — SIGNIFICANT CHANGE UP (ref 3.5–5.3)
POTASSIUM SERPL-SCNC: 4.1 MMOL/L — SIGNIFICANT CHANGE UP (ref 3.5–5.3)
PROT SERPL-MCNC: 4.9 G/DL — LOW (ref 6–8.3)
RBC # BLD: 3.31 M/UL — LOW (ref 3.8–5.2)
RBC # FLD: 30.7 % — HIGH (ref 10.3–14.5)
SODIUM SERPL-SCNC: 141 MMOL/L — SIGNIFICANT CHANGE UP (ref 135–145)
VIT B12 SERPL-MCNC: 539 PG/ML — SIGNIFICANT CHANGE UP (ref 232–1245)
WBC # BLD: 13.95 K/UL — HIGH (ref 3.8–10.5)
WBC # FLD AUTO: 13.95 K/UL — HIGH (ref 3.8–10.5)

## 2023-08-26 PROCEDURE — 99232 SBSQ HOSP IP/OBS MODERATE 35: CPT

## 2023-08-26 PROCEDURE — 99232 SBSQ HOSP IP/OBS MODERATE 35: CPT | Mod: GC

## 2023-08-26 RX ORDER — INSULIN LISPRO 100/ML
14 VIAL (ML) SUBCUTANEOUS
Refills: 0 | Status: DISCONTINUED | OUTPATIENT
Start: 2023-08-27 | End: 2023-08-27

## 2023-08-26 RX ORDER — INSULIN LISPRO 100/ML
VIAL (ML) SUBCUTANEOUS
Refills: 0 | Status: DISCONTINUED | OUTPATIENT
Start: 2023-08-26 | End: 2023-09-05

## 2023-08-26 RX ORDER — INSULIN GLARGINE 100 [IU]/ML
28 INJECTION, SOLUTION SUBCUTANEOUS AT BEDTIME
Refills: 0 | Status: DISCONTINUED | OUTPATIENT
Start: 2023-08-26 | End: 2023-08-27

## 2023-08-26 RX ORDER — INSULIN LISPRO 100/ML
16 VIAL (ML) SUBCUTANEOUS
Refills: 0 | Status: DISCONTINUED | OUTPATIENT
Start: 2023-08-27 | End: 2023-08-29

## 2023-08-26 RX ORDER — INSULIN LISPRO 100/ML
14 VIAL (ML) SUBCUTANEOUS
Refills: 0 | Status: DISCONTINUED | OUTPATIENT
Start: 2023-08-26 | End: 2023-08-26

## 2023-08-26 RX ADMIN — Medication 24 MILLIGRAM(S): at 05:25

## 2023-08-26 RX ADMIN — Medication 4: at 18:21

## 2023-08-26 RX ADMIN — Medication 1 MILLIGRAM(S): at 12:34

## 2023-08-26 RX ADMIN — Medication 500000 UNIT(S): at 09:48

## 2023-08-26 RX ADMIN — SODIUM CHLORIDE 4 MILLILITER(S): 9 INJECTION INTRAMUSCULAR; INTRAVENOUS; SUBCUTANEOUS at 23:45

## 2023-08-26 RX ADMIN — Medication 1 TABLET(S): at 12:34

## 2023-08-26 RX ADMIN — ATORVASTATIN CALCIUM 20 MILLIGRAM(S): 80 TABLET, FILM COATED ORAL at 21:49

## 2023-08-26 RX ADMIN — SODIUM CHLORIDE 4 MILLILITER(S): 9 INJECTION INTRAMUSCULAR; INTRAVENOUS; SUBCUTANEOUS at 05:26

## 2023-08-26 RX ADMIN — SODIUM CHLORIDE 4 MILLILITER(S): 9 INJECTION INTRAMUSCULAR; INTRAVENOUS; SUBCUTANEOUS at 17:29

## 2023-08-26 RX ADMIN — Medication 14 UNIT(S): at 09:49

## 2023-08-26 RX ADMIN — MEXILETINE HYDROCHLORIDE 200 MILLIGRAM(S): 150 CAPSULE ORAL at 05:27

## 2023-08-26 RX ADMIN — Medication 14 UNIT(S): at 18:22

## 2023-08-26 RX ADMIN — POLYETHYLENE GLYCOL 3350 17 GRAM(S): 17 POWDER, FOR SOLUTION ORAL at 09:48

## 2023-08-26 RX ADMIN — Medication 3 MILLILITER(S): at 17:29

## 2023-08-26 RX ADMIN — POLYETHYLENE GLYCOL 3350 17 GRAM(S): 17 POWDER, FOR SOLUTION ORAL at 17:29

## 2023-08-26 RX ADMIN — Medication 0.5 MILLIGRAM(S): at 05:27

## 2023-08-26 RX ADMIN — SENNA PLUS 2 TABLET(S): 8.6 TABLET ORAL at 21:49

## 2023-08-26 RX ADMIN — Medication 3 MILLILITER(S): at 23:46

## 2023-08-26 RX ADMIN — Medication 0.5 MILLIGRAM(S): at 17:30

## 2023-08-26 RX ADMIN — APIXABAN 5 MILLIGRAM(S): 2.5 TABLET, FILM COATED ORAL at 17:29

## 2023-08-26 RX ADMIN — INSULIN GLARGINE 28 UNIT(S): 100 INJECTION, SOLUTION SUBCUTANEOUS at 21:48

## 2023-08-26 RX ADMIN — Medication 500000 UNIT(S): at 17:30

## 2023-08-26 RX ADMIN — APIXABAN 5 MILLIGRAM(S): 2.5 TABLET, FILM COATED ORAL at 05:26

## 2023-08-26 RX ADMIN — Medication 3 MILLILITER(S): at 12:32

## 2023-08-26 RX ADMIN — SODIUM CHLORIDE 4 MILLILITER(S): 9 INJECTION INTRAMUSCULAR; INTRAVENOUS; SUBCUTANEOUS at 12:33

## 2023-08-26 RX ADMIN — Medication 14 UNIT(S): at 13:09

## 2023-08-26 RX ADMIN — MEXILETINE HYDROCHLORIDE 200 MILLIGRAM(S): 150 CAPSULE ORAL at 17:28

## 2023-08-26 RX ADMIN — TIOTROPIUM BROMIDE 2 PUFF(S): 18 CAPSULE ORAL; RESPIRATORY (INHALATION) at 12:32

## 2023-08-26 RX ADMIN — CHLORHEXIDINE GLUCONATE 1 APPLICATION(S): 213 SOLUTION TOPICAL at 12:34

## 2023-08-26 RX ADMIN — Medication 3 MILLILITER(S): at 05:25

## 2023-08-26 RX ADMIN — PANTOPRAZOLE SODIUM 40 MILLIGRAM(S): 20 TABLET, DELAYED RELEASE ORAL at 09:49

## 2023-08-26 NOTE — PROGRESS NOTE ADULT - SUBJECTIVE AND OBJECTIVE BOX
Seen earlier today     Chief Complaint: Diabetes Mellitus follow up    INTERVAL HX: Over night hypoglycemia noted. As per pt, she had no symptoms, had full dinner and egg salad for evening snack around 10 pm. FBG 95 this morning before Methylprednisolone given.     Review of Systems:  General: As above  GI: No nausea, vomiting  Endocrine: no  S&Sx of hypoglycemia    Allergies    tetanus toxoid (Short breath)  cefepime (Anaphylaxis)  penicillin (Anaphylaxis)  Avelox (Short breath; Pruritus)  Dilaudid (Short breath)  codeine (Short breath)  aspirin (Short breath)  iodine (Short breath; Swelling)  Valium (Short breath)  shellfish (Anaphylaxis)    Intolerances      MEDICATIONS  (STANDING):  atorvastatin 20 milliGRAM(s) Oral at bedtime  buDESOnide    Inhalation Suspension 0.5 milliGRAM(s) Inhalation two times a day  dextrose 5%. 1000 milliLiter(s) (50 mL/Hr) IV Continuous <Continuous>  dextrose 5%. 1000 milliLiter(s) (100 mL/Hr) IV Continuous <Continuous>  dextrose 50% Injectable 12.5 Gram(s) IV Push once  dextrose 50% Injectable 25 Gram(s) IV Push once  dextrose 50% Injectable 25 Gram(s) IV Push once  glucagon  Injectable 1 milliGRAM(s) IntraMuscular once  insulin glargine Injectable (LANTUS) 28 Unit(s) SubCutaneous at bedtime  insulin lispro (ADMELOG) corrective regimen sliding scale   SubCutaneous three times a day before meals  insulin lispro (ADMELOG) corrective regimen sliding scale   SubCutaneous at bedtime  insulin lispro Injectable (ADMELOG) 14 Unit(s) SubCutaneous three times a day with meals  methylPREDNISolone 24 milliGRAM(s) Oral daily  methylPREDNISolone   Oral   mexiletine 200 milliGRAM(s) Oral every 8 hours  trimethoprim  160 mG/sulfamethoxazole 800 mG 1 Tablet(s) Oral daily      atorvastatin   20 milliGRAM(s) Oral (08-25-23 @ 21:50)    insulin glargine Injectable (LANTUS)   32 Unit(s) SubCutaneous (08-25-23 @ 21:50)    insulin lispro (ADMELOG) corrective regimen sliding scale   4 Unit(s) SubCutaneous (08-25-23 @ 18:12)    insulin lispro Injectable (ADMELOG)   14 Unit(s) SubCutaneous (08-26-23 @ 13:09)   14 Unit(s) SubCutaneous (08-26-23 @ 09:49)    insulin lispro Injectable (ADMELOG)   16 Unit(s) SubCutaneous (08-25-23 @ 18:13)    methylPREDNISolone   24 milliGRAM(s) Oral (08-26-23 @ 05:25)        PHYSICAL EXAM:  VITALS: T(C): 36.9 (08-26-23 @ 15:03)  T(F): 98.5 (08-26-23 @ 15:03), Max: 98.5 (08-25-23 @ 19:45)  HR: 86 (08-26-23 @ 15:03) (75 - 86)  BP: 118/73 (08-26-23 @ 15:03) (100/59 - 118/73)  RR:  (18 - 18)  SpO2:  (97% - 100%)  Wt(kg): --  GENERAL: female laying in bed, in NAD  Respiratory: Respirations unlabored   Extremities: Warm, no edema  NEURO: Alert , appropriate     LABS:  POCT Blood Glucose.: 219 mg/dL (08-26-23 @ 17:29)  POCT Blood Glucose.: 126 mg/dL (08-26-23 @ 13:02)  POCT Blood Glucose.: 95 mg/dL (08-26-23 @ 09:05)  POCT Blood Glucose.: 155 mg/dL (08-26-23 @ 03:59)  POCT Blood Glucose.: 112 mg/dL (08-26-23 @ 02:35)  POCT Blood Glucose.: 57 mg/dL (08-26-23 @ 02:10)  POCT Blood Glucose.: 64 mg/dL (08-26-23 @ 02:05)  POCT Blood Glucose.: 112 mg/dL (08-25-23 @ 21:30)  POCT Blood Glucose.: 216 mg/dL (08-25-23 @ 17:29)  POCT Blood Glucose.: 119 mg/dL (08-25-23 @ 13:30)  POCT Blood Glucose.: 284 mg/dL (08-25-23 @ 09:01)  POCT Blood Glucose.: 231 mg/dL (08-25-23 @ 01:38)  POCT Blood Glucose.: 200 mg/dL (08-24-23 @ 21:25)  POCT Blood Glucose.: 210 mg/dL (08-24-23 @ 17:25)  POCT Blood Glucose.: 159 mg/dL (08-24-23 @ 12:53)  POCT Blood Glucose.: 231 mg/dL (08-24-23 @ 09:09)  POCT Blood Glucose.: 180 mg/dL (08-24-23 @ 01:49)  POCT Blood Glucose.: 158 mg/dL (08-23-23 @ 21:13)                          7.7    13.95 )-----------( 188      ( 26 Aug 2023 07:19 )             25.7     08-26    141  |  105  |  17  ----------------------------<  132<H>  4.1   |  25  |  0.67    Ca    8.3<L>      26 Aug 2023 07:20  Phos  3.0     08-26  Mg     2.1     08-26    TPro  4.9<L>  /  Alb  3.1<L>  /  TBili  0.8  /  DBili  x   /  AST  45<H>  /  ALT  20  /  AlkPhos  59  08-26    eGFR: 92 mL/min/1.73m2 (26 Aug 2023 07:20)      Thyroid Function Tests:      A1C with Estimated Average Glucose Result: 9.1 % (06-17-23 @ 10:27)    Estimated Average Glucose: 214 mg/dL (06-17-23 @ 10:27)        Diet, Regular:   Consistent Carbohydrate Evening Snack (CSTCHOSN)  Supplement Feeding Modality:  Oral  Glucerna Shake Cans or Servings Per Day:  3       Frequency:  Three Times a day (08-25-23 @ 14:57) [Active]

## 2023-08-26 NOTE — PROGRESS NOTE ADULT - NUTRITIONAL ASSESSMENT
Diet, Regular:   Consistent Carbohydrate {Evening Snack} (CSTCHOSN)  Supplement Feeding Modality:  Oral  Glucerna Shake Cans or Servings Per Day:  3       Frequency:  Three Times a day (08-25-23 @ 14:57) [Active]

## 2023-08-26 NOTE — PROGRESS NOTE ADULT - PROBLEM SELECTOR PLAN 1
- CT chest showed increased bronchial secretions, on RA saturating well, speaking in full sentences, unclear trigger, likely due to infection, or inflammation.     - sputum culture grew MRSA, s/p 7 day course of ertapenem, followed by another 7 days course of linezolid   - hypertonic saline 7% and albuterol neb q6h standing, followed by use of airway clearance device (patient brought her own from home)  - chest PT as tolerated   - budesonide 0.5mg neb q12h standing   - now on solumedrol taper - 24mg, decrease by 8mg every week to 8 mg  - continue with bactrim for PCP ppx   - pulm following, appreciate recs  - echo: hyperdynamic LV, likely normal RV function

## 2023-08-26 NOTE — PROGRESS NOTE ADULT - ATTENDING COMMENTS
72F  with history of bronchiectasis on chronic steroids,   s/p surgery, allergic rhinitis,  recurrent PNA, PND, tracheomalacia s/p tracheoplasty, ESBL proteus infections (sputum), T2DM, paroxysmal A-fib on Eliquis, colorectal cancer many years ago status post colostomy presenting with shortness of breath of 3 day duration. Prior issues with ESBL/Proteus/yeast in sputum culture and was treated with ertapenem/Benadryl/vancomycin/aztreonam and methylprednisolone. She was discharged home with a midline and completed a course of antibiotics ertapenem (last dose 06/25). She was also recently  treated for MRSA and pseudomonas in sputum and completed Tobra nebs and doxycycline.    1. Acute on chronic hypoxic RF due to exacerbation of bronchiectasis (on chronic steroid)  2. Recent tracheo-bronchitis due to ESBL proteus/MRSA/pseudomonas  3. Anemia, likely hemolysis and iron deficiency  4. Paroxysmal A-fib on Eliquis  5. H/o colorectal cancer, status post colostomy    - She reports feeling improved, saturating  O2 97-99% on ambulation off O2.  - Repeat sputum c/s grew MRSA, completed  PO Zyvox for 7 days  - Heme consult called for Hb 6.9 and positive hemolysis ( in May Hb was 12), FOBT neg, Iron studies. Transfused yesterday  1u PRBC today and Hgb with appropriate rise, reports improvement in shortness of breath as well as a result.   -  Echo showed severe AS, s/p TAVR  - Pulmonary plans noted- on bronchodilators, PO prednisone taper, inhaled steroid, s/p IV Ertapenem and Hypertonic saline to 7% for airway clearance device (patient brought her own from home) and chest PT as tolerated.   - CT chest shows unchanged infectious/inflammatory small airways disease in the right middle/lower lobes with multifocal small tree-in-bud nodules. Status post ascending aortic and aortic valve replacement, with residual dissection flap better seen on the prior study. Stable indeterminate small left renal nodule and numerous pancreatic cysts.  - Echo bubble study no shunt  - Endocrine f/u plans noted- on basal/bolus insulins FS elevated due to steroid.   - Her outpatient Pul- Dr Allison is aware.  - Structural team discussing with CTS regarding if TAVR to be considered.

## 2023-08-26 NOTE — PROGRESS NOTE ADULT - ASSESSMENT
72 F w/h/o uncontrolled T2DM (A1C 9.4%) on basal/bolus insulin PTA. Unknown DM complications. Also COPD, secondary adrenal Insufficiency on chronic steroids, colorectal cancer s/p resection (colostomy bag), Chronic A fib on Eliquis, and tracheomalacia and multiple intubations, type A aortic dissection s/p aortic dissection repair on 9/07/22, sepsis. Pt well known to this provider from prior admissions. Here with SOB> treated for PNA and on Prednisolone to 24mg, also found to have anemia and  severe aortic stenosis> hematology and ct surgery following pt. Endocrine consulted for assistance with uncontrolled DM. BG Goal 100-180mg/dl. On steroid taper ( on chronic steroid ) with BG levesl variable. Hypoglycemia overnight with tightly controlled fasting BG ( 96) after eating full dinner and evening snacks. Will decrease basal insulin, recommended to move timing of Methylprednisolone to 9am to evaluate fasting BG better. premeal insulin for breakfast and dinner decreased and increase premeal insulin for lunch for hyperglycemia at dinner time for 3 days.    Will start Freestyle Luis E 3 if pt going home. Needs keshia on phone but pt states she wants daughter to do it.

## 2023-08-26 NOTE — PROGRESS NOTE ADULT - PROBLEM SELECTOR PLAN 1
Test BG ac and hs and 2am while on steroids  Decrease Lantus to 28 units qhs for now  Adjust Admelog dose to 14-16-14 units qac for now.  Hold if NPo or eating less than 50% of meals.  C/w Mod correction scale qac + bedtime+ check 2 am BG  Please contact endo team with any changes on steroid doses ( noted Methylprednisolone dose decrease to 16 mg on8/30 and down to 8mg on 9/6)  - please give methylprednisolone after AM BG check to evaluate fasting BG better ( spoke with RN )  - Please offer patient evening snacks ( bedside RN aware)     Discharge:  Will be determined according to BG/insulin needs/PO intake and steroid therapy at time of discharge.  Plan to d/c on basal bolus. Doses TBD  Outpt. endo follow-up. Dr Saavedra  Outpt. optho, podiatry, micro/cr  Make sure pt has Rx for all DM supplies and insulin/ DM meds. Consider FreeInVisM Luis E 3 if going home

## 2023-08-26 NOTE — PROGRESS NOTE ADULT - PROBLEM SELECTOR PLAN 2
- MCV 77.8, haptoglobin low, LDH high, normal Tbili, Plt w/n range, no bruising, skin changes, no splenomegaly  - anemia workup c/f hemolysis, heme and CTS consulted  - given darby negative, more likely related to bioprosthetic AV stenosis   - patient currently has a bioprosthetic Aortic Valve and recent echo suggests aortic stenosis   - CTS consulted, not a candidate for open surgical repair  - structural heart team to be consulted for possible valve in valve TAVR - MCV 77.8, haptoglobin low, LDH high, normal Tbili, Plt w/n range, no bruising, skin changes, no splenomegaly  - anemia workup c/f hemolysis, heme and CTS consulted  - given darby negative, more likely related to bioprosthetic AV stenosis   - patient currently has a bioprosthetic Aortic Valve and recent echo suggests aortic stenosis   - CTS consulted, not a candidate for open surgical repair  - structural heart team to be consulted for possible valve in valve TAVR on Monday

## 2023-08-26 NOTE — PROGRESS NOTE ADULT - PROBLEM SELECTOR PLAN 4
On chronic steroids at home > medrol 8mg qd from admission in 2022  Per pt she has been sick this year with multiple hospitalizations requiring pulse steroids. Was on Methylprednisolone 28mg PTA.   -Will need slow titration back to Medrol 8mg qd   - Will need stress steroids if acutely ill. Has been on higher dose this year due to SOB exacerbations/hospitalizations.    Assessed pt/labs/meds and discussed plan of care with RN and patient   Pamela Porter NP-C  Contact via [employer] Teams during business hours  To reach covering provider access AMION via sunrise tools  For Urgent matters/after-hours/weekends/holidays please page endocrine fellow on call   For nonurgent matters please email [email or web address]    Please note that this patient may be followed by different provider tomorrow.  Notify endocrine 24 hours prior to discharge for final recommendations

## 2023-08-26 NOTE — PROGRESS NOTE ADULT - SUBJECTIVE AND OBJECTIVE BOX
Patient is a 72y old  Female who presents with a chief complaint of sob (25 Aug 2023 18:14)     INTERVAL HPI/OVERNIGHT EVENTS:  - No acute events overnight    SUBJECTIVE  - Patient seen and evaluated at bedside  - Patient reports presence of   - Patient reports absence of fevers, chills, HA, lightheadedness, dizziness, nausea, emesis, chest pain, dyspnea, palpitations, abd pain, diarrhea, urinary symptoms, skin color changes or rashes, or LE edema     MEDICATIONS  (STANDING):  albuterol/ipratropium for Nebulization 3 milliLiter(s) Nebulizer every 6 hours  apixaban 5 milliGRAM(s) Oral every 12 hours  atorvastatin 20 milliGRAM(s) Oral at bedtime  buDESOnide    Inhalation Suspension 0.5 milliGRAM(s) Inhalation two times a day  chlorhexidine 4% Liquid 1 Application(s) Topical daily  dextrose 5%. 1000 milliLiter(s) (50 mL/Hr) IV Continuous <Continuous>  dextrose 5%. 1000 milliLiter(s) (100 mL/Hr) IV Continuous <Continuous>  dextrose 50% Injectable 25 Gram(s) IV Push once  dextrose 50% Injectable 12.5 Gram(s) IV Push once  dextrose 50% Injectable 25 Gram(s) IV Push once  folic acid 1 milliGRAM(s) Oral daily  glucagon  Injectable 1 milliGRAM(s) IntraMuscular once  insulin glargine Injectable (LANTUS) 32 Unit(s) SubCutaneous at bedtime  insulin lispro (ADMELOG) corrective regimen sliding scale   SubCutaneous at bedtime  insulin lispro (ADMELOG) corrective regimen sliding scale   SubCutaneous three times a day before meals  insulin lispro Injectable (ADMELOG) 16 Unit(s) SubCutaneous three times a day with meals  methylPREDNISolone   Oral   methylPREDNISolone 24 milliGRAM(s) Oral daily  mexiletine 200 milliGRAM(s) Oral every 8 hours  nystatin    Suspension 122386 Unit(s) Swish and Swallow two times a day  pantoprazole    Tablet 40 milliGRAM(s) Oral before breakfast  polyethylene glycol 3350 17 Gram(s) Oral two times a day  senna 2 Tablet(s) Oral at bedtime  sodium chloride 7% Inhalation 4 milliLiter(s) Inhalation every 6 hours  tiotropium 2.5 MICROgram(s) Inhaler 2 Puff(s) Inhalation daily  trimethoprim  160 mG/sulfamethoxazole 800 mG 1 Tablet(s) Oral daily    MEDICATIONS  (PRN):  acetaminophen     Tablet .. 650 milliGRAM(s) Oral every 6 hours PRN Temp greater or equal to 38C (100.4F), Mild Pain (1 - 3)  aluminum hydroxide/magnesium hydroxide/simethicone Suspension 30 milliLiter(s) Oral every 4 hours PRN Dyspepsia  dextrose Oral Gel 15 Gram(s) Oral once PRN Blood Glucose LESS THAN 70 milliGRAM(s)/deciliter  diphenhydrAMINE 25 milliGRAM(s) Oral daily PRN Allergy symptoms  melatonin 3 milliGRAM(s) Oral at bedtime PRN Insomnia  ondansetron Injectable 4 milliGRAM(s) IV Push every 8 hours PRN Nausea and/or Vomiting        REVIEW OF SYSTEMS: As indicated above; otherwise, negative    VITAL SIGNS:  T(F): 98.4 (08-26-23 @ 06:39), Max: 98.5 (08-25-23 @ 19:45)  HR: 83 (08-26-23 @ 06:39) (75 - 86)  BP: 100/59 (08-26-23 @ 06:39) (100/59 - 118/75)  RR: 18 (08-26-23 @ 06:39) (18 - 18)  SpO2: 98% (08-26-23 @ 06:39) (97% - 100%)    PHYSICAL EXAM:  General: NAD, well-groomed, well-developed  Eyes: Conjunctiva and sclera clear  ENMT: Moist mucous membranes  Neck: No palpable pre-auricular, post-auricular, occipital, mandibular, submental, supra-clavicular, or infra-clavicular lymph nodes   Chest: Clear to auscultation bilaterally; no rales, rhonchi, or wheezing  Heart: Regular rate and rhythm; normal S1 and S2; no murmurs, rubs, or gallops  Abd: Soft, nontender, nondistended  Nervous System: AAOX3  Psych: Appropriate affect  Ext: no peripheral LE edema bilaterally    LABS:                        6.9    12.96 )-----------( 216      ( 25 Aug 2023 11:38 )             24.0     25 Aug 2023 11:38    139    |  103    |  23     ----------------------------<  269    4.8     |  23     |  0.64     Ca    8.7        25 Aug 2023 11:38  Phos  2.5       25 Aug 2023 11:38  Mg     1.9       25 Aug 2023 11:38    TPro  5.5    /  Alb  3.4    /  TBili  0.8    /  DBili  x      /  AST  52     /  ALT  25     /  AlkPhos  65     25 Aug 2023 11:38    CAPILLARY BLOOD GLUCOSE      POCT Blood Glucose.: 155 mg/dL (26 Aug 2023 03:59)  POCT Blood Glucose.: 112 mg/dL (26 Aug 2023 02:35)  POCT Blood Glucose.: 57 mg/dL (26 Aug 2023 02:10)  POCT Blood Glucose.: 64 mg/dL (26 Aug 2023 02:05)  POCT Blood Glucose.: 112 mg/dL (25 Aug 2023 21:30)  POCT Blood Glucose.: 216 mg/dL (25 Aug 2023 17:29)  POCT Blood Glucose.: 119 mg/dL (25 Aug 2023 13:30)  POCT Blood Glucose.: 284 mg/dL (25 Aug 2023 09:01)    BLOOD CULTURE    RADIOLOGY & ADDITIONAL TESTS:    Imaging Personally Reviewed:  [X ] YES     Consultant(s) Notes Reviewed:  Yes    Care Discussed with Consultants/Other Providers: Yes Patient is a 72y old  Female who presents with a chief complaint of sob (25 Aug 2023 18:14)     INTERVAL HPI/OVERNIGHT EVENTS:  - No acute events overnight    SUBJECTIVE  - Patient seen and evaluated at bedside. Patient states that her fatigue and shortness of breath significantly improved after receiving the blood transfusion. Patient continues to have a productive cough, but it has not change in severity or frequency compared to prior days. Patient denies any fever, chills, weakness, nausea, vomiting, abdominal pain, or constipation.     MEDICATIONS  (STANDING):  albuterol/ipratropium for Nebulization 3 milliLiter(s) Nebulizer every 6 hours  apixaban 5 milliGRAM(s) Oral every 12 hours  atorvastatin 20 milliGRAM(s) Oral at bedtime  buDESOnide    Inhalation Suspension 0.5 milliGRAM(s) Inhalation two times a day  chlorhexidine 4% Liquid 1 Application(s) Topical daily  dextrose 5%. 1000 milliLiter(s) (50 mL/Hr) IV Continuous <Continuous>  dextrose 5%. 1000 milliLiter(s) (100 mL/Hr) IV Continuous <Continuous>  dextrose 50% Injectable 25 Gram(s) IV Push once  dextrose 50% Injectable 12.5 Gram(s) IV Push once  dextrose 50% Injectable 25 Gram(s) IV Push once  folic acid 1 milliGRAM(s) Oral daily  glucagon  Injectable 1 milliGRAM(s) IntraMuscular once  insulin glargine Injectable (LANTUS) 32 Unit(s) SubCutaneous at bedtime  insulin lispro (ADMELOG) corrective regimen sliding scale   SubCutaneous at bedtime  insulin lispro (ADMELOG) corrective regimen sliding scale   SubCutaneous three times a day before meals  insulin lispro Injectable (ADMELOG) 16 Unit(s) SubCutaneous three times a day with meals  methylPREDNISolone   Oral   methylPREDNISolone 24 milliGRAM(s) Oral daily  mexiletine 200 milliGRAM(s) Oral every 8 hours  nystatin    Suspension 113889 Unit(s) Swish and Swallow two times a day  pantoprazole    Tablet 40 milliGRAM(s) Oral before breakfast  polyethylene glycol 3350 17 Gram(s) Oral two times a day  senna 2 Tablet(s) Oral at bedtime  sodium chloride 7% Inhalation 4 milliLiter(s) Inhalation every 6 hours  tiotropium 2.5 MICROgram(s) Inhaler 2 Puff(s) Inhalation daily  trimethoprim  160 mG/sulfamethoxazole 800 mG 1 Tablet(s) Oral daily    MEDICATIONS  (PRN):  acetaminophen     Tablet .. 650 milliGRAM(s) Oral every 6 hours PRN Temp greater or equal to 38C (100.4F), Mild Pain (1 - 3)  aluminum hydroxide/magnesium hydroxide/simethicone Suspension 30 milliLiter(s) Oral every 4 hours PRN Dyspepsia  dextrose Oral Gel 15 Gram(s) Oral once PRN Blood Glucose LESS THAN 70 milliGRAM(s)/deciliter  diphenhydrAMINE 25 milliGRAM(s) Oral daily PRN Allergy symptoms  melatonin 3 milliGRAM(s) Oral at bedtime PRN Insomnia  ondansetron Injectable 4 milliGRAM(s) IV Push every 8 hours PRN Nausea and/or Vomiting        REVIEW OF SYSTEMS: As indicated above; otherwise, negative    VITAL SIGNS:  T(F): 98.4 (08-26-23 @ 06:39), Max: 98.5 (08-25-23 @ 19:45)  HR: 83 (08-26-23 @ 06:39) (75 - 86)  BP: 100/59 (08-26-23 @ 06:39) (100/59 - 118/75)  RR: 18 (08-26-23 @ 06:39) (18 - 18)  SpO2: 98% (08-26-23 @ 06:39) (97% - 100%)    PHYSICAL EXAM:  General: NAD, well-groomed, well-developed  Eyes: Pupils equal, round, reactive to light, anicteric sclera   ENMT: Moist mucous membranes  Neck: supple no JVD, no tender lymphoadenopathy   Chest: course breath sounds bilaterally; no rales, rhonchi, or wheezing, no accessory muscle use for respiration  Heart: Regular rate and rhythm; normal S1 and S2; + systolic murmur   Abdomen: Soft, nontender, nondistended, normoactive bowel sounds   Extremities: Warm, well perfused, 1+ peripheral LE edema bilaterally  Nervous System: AOx3, no focal neurological deficits   Psych: Appropriate affect      LABS:                        6.9    12.96 )-----------( 216      ( 25 Aug 2023 11:38 )             24.0     25 Aug 2023 11:38    139    |  103    |  23     ----------------------------<  269    4.8     |  23     |  0.64     Ca    8.7        25 Aug 2023 11:38  Phos  2.5       25 Aug 2023 11:38  Mg     1.9       25 Aug 2023 11:38    TPro  5.5    /  Alb  3.4    /  TBili  0.8    /  DBili  x      /  AST  52     /  ALT  25     /  AlkPhos  65     25 Aug 2023 11:38    CAPILLARY BLOOD GLUCOSE      POCT Blood Glucose.: 155 mg/dL (26 Aug 2023 03:59)  POCT Blood Glucose.: 112 mg/dL (26 Aug 2023 02:35)  POCT Blood Glucose.: 57 mg/dL (26 Aug 2023 02:10)  POCT Blood Glucose.: 64 mg/dL (26 Aug 2023 02:05)  POCT Blood Glucose.: 112 mg/dL (25 Aug 2023 21:30)  POCT Blood Glucose.: 216 mg/dL (25 Aug 2023 17:29)  POCT Blood Glucose.: 119 mg/dL (25 Aug 2023 13:30)  POCT Blood Glucose.: 284 mg/dL (25 Aug 2023 09:01)    BLOOD CULTURE    RADIOLOGY & ADDITIONAL TESTS:    Imaging Personally Reviewed:  [X ] YES     Consultant(s) Notes Reviewed:  Yes    Care Discussed with Consultants/Other Providers: Yes

## 2023-08-26 NOTE — PROGRESS NOTE ADULT - PROBLEM SELECTOR PLAN 4
- on 25-30 lantus at home, and 7-20 mealtime as well  - patient's blood glucose levels were initially difficult to control given high doses of steroids, now on taper   - endo following and dosing insulin accordingly

## 2023-08-27 LAB
ALBUMIN SERPL ELPH-MCNC: 3.2 G/DL — LOW (ref 3.3–5)
ALP SERPL-CCNC: 59 U/L — SIGNIFICANT CHANGE UP (ref 40–120)
ALT FLD-CCNC: 21 U/L — SIGNIFICANT CHANGE UP (ref 10–45)
ANION GAP SERPL CALC-SCNC: 9 MMOL/L — SIGNIFICANT CHANGE UP (ref 5–17)
AST SERPL-CCNC: 47 U/L — HIGH (ref 10–40)
BILIRUB SERPL-MCNC: 0.7 MG/DL — SIGNIFICANT CHANGE UP (ref 0.2–1.2)
BLD GP AB SCN SERPL QL: NEGATIVE — SIGNIFICANT CHANGE UP
BUN SERPL-MCNC: 14 MG/DL — SIGNIFICANT CHANGE UP (ref 7–23)
CALCIUM SERPL-MCNC: 8.3 MG/DL — LOW (ref 8.4–10.5)
CHLORIDE SERPL-SCNC: 105 MMOL/L — SIGNIFICANT CHANGE UP (ref 96–108)
CO2 SERPL-SCNC: 26 MMOL/L — SIGNIFICANT CHANGE UP (ref 22–31)
CREAT SERPL-MCNC: 0.65 MG/DL — SIGNIFICANT CHANGE UP (ref 0.5–1.3)
EGFR: 92 ML/MIN/1.73M2 — SIGNIFICANT CHANGE UP
GLUCOSE BLDC GLUCOMTR-MCNC: 175 MG/DL — HIGH (ref 70–99)
GLUCOSE BLDC GLUCOMTR-MCNC: 193 MG/DL — HIGH (ref 70–99)
GLUCOSE BLDC GLUCOMTR-MCNC: 283 MG/DL — HIGH (ref 70–99)
GLUCOSE BLDC GLUCOMTR-MCNC: 322 MG/DL — HIGH (ref 70–99)
GLUCOSE BLDC GLUCOMTR-MCNC: 341 MG/DL — HIGH (ref 70–99)
GLUCOSE SERPL-MCNC: 201 MG/DL — HIGH (ref 70–99)
GRAM STN FLD: SIGNIFICANT CHANGE UP
HCT VFR BLD CALC: 24.4 % — LOW (ref 34.5–45)
HGB BLD-MCNC: 7.3 G/DL — LOW (ref 11.5–15.5)
MAGNESIUM SERPL-MCNC: 2.2 MG/DL — SIGNIFICANT CHANGE UP (ref 1.6–2.6)
MCHC RBC-ENTMCNC: 23.4 PG — LOW (ref 27–34)
MCHC RBC-ENTMCNC: 29.9 GM/DL — LOW (ref 32–36)
MCV RBC AUTO: 78.2 FL — LOW (ref 80–100)
NRBC # BLD: 14 /100 WBCS — HIGH (ref 0–0)
PHOSPHATE SERPL-MCNC: 2.6 MG/DL — SIGNIFICANT CHANGE UP (ref 2.5–4.5)
PLATELET # BLD AUTO: 184 K/UL — SIGNIFICANT CHANGE UP (ref 150–400)
POTASSIUM SERPL-MCNC: 4.5 MMOL/L — SIGNIFICANT CHANGE UP (ref 3.5–5.3)
POTASSIUM SERPL-SCNC: 4.5 MMOL/L — SIGNIFICANT CHANGE UP (ref 3.5–5.3)
PROT SERPL-MCNC: 5 G/DL — LOW (ref 6–8.3)
RBC # BLD: 3.12 M/UL — LOW (ref 3.8–5.2)
RBC # FLD: 32.3 % — HIGH (ref 10.3–14.5)
RH IG SCN BLD-IMP: POSITIVE — SIGNIFICANT CHANGE UP
SODIUM SERPL-SCNC: 140 MMOL/L — SIGNIFICANT CHANGE UP (ref 135–145)
SPECIMEN SOURCE: SIGNIFICANT CHANGE UP
WBC # BLD: 15.97 K/UL — HIGH (ref 3.8–10.5)
WBC # FLD AUTO: 15.97 K/UL — HIGH (ref 3.8–10.5)

## 2023-08-27 PROCEDURE — 99232 SBSQ HOSP IP/OBS MODERATE 35: CPT | Mod: GC

## 2023-08-27 PROCEDURE — 99232 SBSQ HOSP IP/OBS MODERATE 35: CPT

## 2023-08-27 RX ORDER — INSULIN GLARGINE 100 [IU]/ML
30 INJECTION, SOLUTION SUBCUTANEOUS AT BEDTIME
Refills: 0 | Status: DISCONTINUED | OUTPATIENT
Start: 2023-08-27 | End: 2023-08-28

## 2023-08-27 RX ORDER — INSULIN LISPRO 100/ML
16 VIAL (ML) SUBCUTANEOUS
Refills: 0 | Status: DISCONTINUED | OUTPATIENT
Start: 2023-08-27 | End: 2023-08-29

## 2023-08-27 RX ADMIN — SODIUM CHLORIDE 4 MILLILITER(S): 9 INJECTION INTRAMUSCULAR; INTRAVENOUS; SUBCUTANEOUS at 22:52

## 2023-08-27 RX ADMIN — MEXILETINE HYDROCHLORIDE 200 MILLIGRAM(S): 150 CAPSULE ORAL at 21:32

## 2023-08-27 RX ADMIN — MEXILETINE HYDROCHLORIDE 200 MILLIGRAM(S): 150 CAPSULE ORAL at 06:36

## 2023-08-27 RX ADMIN — TIOTROPIUM BROMIDE 2 PUFF(S): 18 CAPSULE ORAL; RESPIRATORY (INHALATION) at 11:46

## 2023-08-27 RX ADMIN — Medication 8: at 13:03

## 2023-08-27 RX ADMIN — Medication 30 MILLILITER(S): at 01:01

## 2023-08-27 RX ADMIN — Medication 3 MILLILITER(S): at 11:46

## 2023-08-27 RX ADMIN — Medication 1 MILLIGRAM(S): at 11:47

## 2023-08-27 RX ADMIN — Medication 1 TABLET(S): at 11:47

## 2023-08-27 RX ADMIN — SODIUM CHLORIDE 4 MILLILITER(S): 9 INJECTION INTRAMUSCULAR; INTRAVENOUS; SUBCUTANEOUS at 11:46

## 2023-08-27 RX ADMIN — Medication 8: at 17:51

## 2023-08-27 RX ADMIN — SODIUM CHLORIDE 4 MILLILITER(S): 9 INJECTION INTRAMUSCULAR; INTRAVENOUS; SUBCUTANEOUS at 17:29

## 2023-08-27 RX ADMIN — MEXILETINE HYDROCHLORIDE 200 MILLIGRAM(S): 150 CAPSULE ORAL at 01:02

## 2023-08-27 RX ADMIN — APIXABAN 5 MILLIGRAM(S): 2.5 TABLET, FILM COATED ORAL at 06:35

## 2023-08-27 RX ADMIN — CHLORHEXIDINE GLUCONATE 1 APPLICATION(S): 213 SOLUTION TOPICAL at 11:53

## 2023-08-27 RX ADMIN — Medication 3 MILLILITER(S): at 17:29

## 2023-08-27 RX ADMIN — POLYETHYLENE GLYCOL 3350 17 GRAM(S): 17 POWDER, FOR SOLUTION ORAL at 06:36

## 2023-08-27 RX ADMIN — Medication 14 UNIT(S): at 09:22

## 2023-08-27 RX ADMIN — Medication 500000 UNIT(S): at 17:31

## 2023-08-27 RX ADMIN — INSULIN GLARGINE 30 UNIT(S): 100 INJECTION, SOLUTION SUBCUTANEOUS at 21:31

## 2023-08-27 RX ADMIN — Medication 16 UNIT(S): at 17:52

## 2023-08-27 RX ADMIN — SODIUM CHLORIDE 4 MILLILITER(S): 9 INJECTION INTRAMUSCULAR; INTRAVENOUS; SUBCUTANEOUS at 06:38

## 2023-08-27 RX ADMIN — POLYETHYLENE GLYCOL 3350 17 GRAM(S): 17 POWDER, FOR SOLUTION ORAL at 17:30

## 2023-08-27 RX ADMIN — ATORVASTATIN CALCIUM 20 MILLIGRAM(S): 80 TABLET, FILM COATED ORAL at 21:33

## 2023-08-27 RX ADMIN — Medication 0.5 MILLIGRAM(S): at 06:36

## 2023-08-27 RX ADMIN — Medication 16 UNIT(S): at 13:04

## 2023-08-27 RX ADMIN — ONDANSETRON 4 MILLIGRAM(S): 8 TABLET, FILM COATED ORAL at 01:56

## 2023-08-27 RX ADMIN — APIXABAN 5 MILLIGRAM(S): 2.5 TABLET, FILM COATED ORAL at 17:30

## 2023-08-27 RX ADMIN — SENNA PLUS 2 TABLET(S): 8.6 TABLET ORAL at 21:32

## 2023-08-27 RX ADMIN — Medication 24 MILLIGRAM(S): at 09:19

## 2023-08-27 RX ADMIN — MEXILETINE HYDROCHLORIDE 200 MILLIGRAM(S): 150 CAPSULE ORAL at 13:06

## 2023-08-27 RX ADMIN — PANTOPRAZOLE SODIUM 40 MILLIGRAM(S): 20 TABLET, DELAYED RELEASE ORAL at 06:35

## 2023-08-27 RX ADMIN — Medication 3 MILLILITER(S): at 06:36

## 2023-08-27 RX ADMIN — Medication 3 MILLILITER(S): at 22:50

## 2023-08-27 RX ADMIN — Medication 2: at 21:32

## 2023-08-27 RX ADMIN — Medication 0.5 MILLIGRAM(S): at 17:53

## 2023-08-27 RX ADMIN — Medication 500000 UNIT(S): at 06:35

## 2023-08-27 RX ADMIN — Medication 2: at 09:21

## 2023-08-27 NOTE — PROGRESS NOTE ADULT - PROBLEM SELECTOR PLAN 2
- MCV 77.8, haptoglobin low, LDH high, normal Tbili, Plt w/n range, no bruising, skin changes, no splenomegaly  - anemia workup c/f hemolysis, heme and CTS consulted  - given darby negative, more likely related to bioprosthetic AV stenosis   - patient currently has a bioprosthetic Aortic Valve and recent echo suggests aortic stenosis   - CTS consulted, not a candidate for open surgical repair  - structural heart team to be consulted for possible valve in valve TAVR on Monday - MCV 77.8, haptoglobin low, LDH high, normal Tbili, Plt w/n range, no bruising, skin changes, no splenomegaly  - anemia workup c/f hemolysis, heme and CTS consulted  - given darby negative, more likely related to bioprosthetic AV stenosis   - patient currently has a bioprosthetic Aortic Valve and recent echo suggests aortic stenosis   - CTS consulted, not a candidate for open surgical repair  - structural heart team to be consulted for possible valve in valve TAVR on Monday.

## 2023-08-27 NOTE — PROGRESS NOTE ADULT - ATTENDING COMMENTS
72F  with history of bronchiectasis on chronic steroids,   s/p surgery, allergic rhinitis,  recurrent PNA, PND, tracheomalacia s/p tracheoplasty, ESBL proteus infections (sputum), T2DM, paroxysmal A-fib on Eliquis, colorectal cancer many years ago status post colostomy presenting with shortness of breath of 3 day duration. Prior issues with ESBL/Proteus/yeast in sputum culture and was treated with ertapenem/Benadryl/vancomycin/aztreonam and methylprednisolone. She was discharged home with a midline and completed a course of antibiotics ertapenem (last dose 06/25). She was also recently  treated for MRSA and pseudomonas in sputum and completed Tobra nebs and doxycycline.    1. Acute on chronic hypoxic RF due to exacerbation of bronchiectasis (on chronic steroid)  2. Recent tracheo-bronchitis due to ESBL proteus/MRSA/pseudomonas  3. Anemia, likely hemolysis and iron deficiency  4. Paroxysmal A-fib on Eliquis  5. H/o colorectal cancer, status post colostomy    - She reports feeling improved, saturating  O2 97-99% on ambulation off O2.  - Repeat sputum c/s grew MRSA, completed  PO Zyvox for 7 days  - Heme consult called for Hb 6.9 and positive hemolysis ( in May Hb was 12), FOBT neg, Iron studies. Transfused yesterday  1u PRBC today and Hgb with appropriate rise, latest Hgb 7.3. As per Hematology, transfuse Hgb <7.    -  Echo showed severe AS, s/p TAVR  - Pulmonary plans noted- on bronchodilators, PO prednisone taper, inhaled steroid, s/p IV Ertapenem and Hypertonic saline to 7% for airway clearance device (patient brought her own from home) and chest PT as tolerated.   - CT chest shows unchanged infectious/inflammatory small airways disease in the right middle/lower lobes with multifocal small tree-in-bud nodules. Status post ascending aortic and aortic valve replacement, with residual dissection flap better seen on the prior study. Stable indeterminate small left renal nodule and numerous pancreatic cysts.  - Echo bubble study no shunt  - Endocrine f/u plans noted- on basal/bolus insulins FS elevated due to steroid.   - Her outpatient Pulmonologist - Dr Allison is aware.  - Structural team discussing with CTS regarding if TAVR to be considered. Will reconsult on Monday if needed.

## 2023-08-27 NOTE — PROGRESS NOTE ADULT - PROBLEM SELECTOR PLAN 4
On chronic steroids at home > medrol 8mg qd from admission in 2022  Per pt she has been sick this year with multiple hospitalizations requiring pulse steroids. Was on Methylprednisolone 28mg PTA.   -Will need slow titration back to Medrol 8mg qd   - Will need stress steroids if acutely ill. Has been on higher dose this year due to SOB exacerbations/hospitalizations.    Assessed pt/labs/meds and discussed plan of care with RN and patient   Pamela Porter NP-C  Contact via [employer] Teams during business hours  To reach covering provider access AMION via sunrise tools  For Urgent matters/after-hours/weekends/holidays please page endocrine fellow on call   For nonurgent matters please email [email or web address]    Please note that this patient may be followed by different provider tomorrow.  Notify endocrine 24 hours prior to discharge for final recommendations

## 2023-08-27 NOTE — PROGRESS NOTE ADULT - SUBJECTIVE AND OBJECTIVE BOX
Seen earlier today     Chief Complaint: Diabetes Mellitus follow up    INTERVAL HX: Tolerating POs with good appetite. eats full meals and eat night snack daily. Last 24 hours BGs variable 175-341 with fasting . on Methylprednisolone slow taper ( currently on 24 mg daily until 8/29)      Review of Systems:  General: As above  GI: No nausea, vomiting  Endocrine: no  S&Sx of hypoglycemia    Allergies    tetanus toxoid (Short breath)  cefepime (Anaphylaxis)  penicillin (Anaphylaxis)  Avelox (Short breath; Pruritus)  Dilaudid (Short breath)  codeine (Short breath)  aspirin (Short breath)  iodine (Short breath; Swelling)  Valium (Short breath)  shellfish (Anaphylaxis)    Intolerances      MEDICATIONS  (STANDING):  atorvastatin 20 milliGRAM(s) Oral at bedtime  dextrose 5%. 1000 milliLiter(s) (100 mL/Hr) IV Continuous <Continuous>  dextrose 5%. 1000 milliLiter(s) (50 mL/Hr) IV Continuous <Continuous>  dextrose 50% Injectable 25 Gram(s) IV Push once  dextrose 50% Injectable 12.5 Gram(s) IV Push once  dextrose 50% Injectable 25 Gram(s) IV Push once  glucagon  Injectable 1 milliGRAM(s) IntraMuscular once  insulin glargine Injectable (LANTUS) 28 Unit(s) SubCutaneous at bedtime  insulin lispro (ADMELOG) corrective regimen sliding scale   SubCutaneous <User Schedule>  insulin lispro (ADMELOG) corrective regimen sliding scale   SubCutaneous three times a day before meals  insulin lispro Injectable (ADMELOG) 16 Unit(s) SubCutaneous before breakfast  insulin lispro Injectable (ADMELOG) 16 Unit(s) SubCutaneous before dinner  insulin lispro Injectable (ADMELOG) 16 Unit(s) SubCutaneous before lunch  methylPREDNISolone   Oral   methylPREDNISolone 24 milliGRAM(s) Oral daily  trimethoprim  160 mG/sulfamethoxazole 800 mG 1 Tablet(s) Oral daily      atorvastatin   20 milliGRAM(s) Oral (08-26-23 @ 21:49)    insulin glargine Injectable (LANTUS)   28 Unit(s) SubCutaneous (08-26-23 @ 21:48)    insulin lispro (ADMELOG) corrective regimen sliding scale   8 Unit(s) SubCutaneous (08-27-23 @ 13:03)   2 Unit(s) SubCutaneous (08-27-23 @ 09:21)   4  SubCutaneous (08-26-23 @ 18:21)    insulin lispro Injectable (ADMELOG)   14 Unit(s) SubCutaneous (08-26-23 @ 18:22)    insulin lispro Injectable (ADMELOG)   14 Unit(s) SubCutaneous (08-27-23 @ 09:22)    insulin lispro Injectable (ADMELOG)   16 Unit(s) SubCutaneous (08-27-23 @ 13:04)    methylPREDNISolone   24 milliGRAM(s) Oral (08-27-23 @ 09:19)        PHYSICAL EXAM:  VITALS: T(C): 36.9 (08-27-23 @ 06:35)  T(F): 98.5 (08-27-23 @ 06:35), Max: 98.5 (08-26-23 @ 22:02)  HR: 78 (08-27-23 @ 06:35) (78 - 81)  BP: 96/58 (08-27-23 @ 06:35) (96/58 - 135/69)  RR:  (18 - 20)  SpO2:  (99% - 99%)  Wt(kg): --  GENERAL: female laying in bed, in NAD  Respiratory: Respirations unlabored   Extremities: Warm, no edema  NEURO: Alert , appropriate     LABS:  POCT Blood Glucose.: 341 mg/dL (08-27-23 @ 12:50)  POCT Blood Glucose.: 193 mg/dL (08-27-23 @ 08:43)  POCT Blood Glucose.: 175 mg/dL (08-27-23 @ 02:03)  POCT Blood Glucose.: 237 mg/dL (08-26-23 @ 21:35)  POCT Blood Glucose.: 219 mg/dL (08-26-23 @ 17:29)  POCT Blood Glucose.: 126 mg/dL (08-26-23 @ 13:02)  POCT Blood Glucose.: 95 mg/dL (08-26-23 @ 09:05)  POCT Blood Glucose.: 155 mg/dL (08-26-23 @ 03:59)  POCT Blood Glucose.: 112 mg/dL (08-26-23 @ 02:35)  POCT Blood Glucose.: 57 mg/dL (08-26-23 @ 02:10)  POCT Blood Glucose.: 64 mg/dL (08-26-23 @ 02:05)  POCT Blood Glucose.: 112 mg/dL (08-25-23 @ 21:30)  POCT Blood Glucose.: 216 mg/dL (08-25-23 @ 17:29)  POCT Blood Glucose.: 119 mg/dL (08-25-23 @ 13:30)  POCT Blood Glucose.: 284 mg/dL (08-25-23 @ 09:01)  POCT Blood Glucose.: 231 mg/dL (08-25-23 @ 01:38)  POCT Blood Glucose.: 200 mg/dL (08-24-23 @ 21:25)  POCT Blood Glucose.: 210 mg/dL (08-24-23 @ 17:25)                          7.3    15.97 )-----------( 184      ( 27 Aug 2023 11:23 )             24.4     08-27    140  |  105  |  14  ----------------------------<  201<H>  4.5   |  26  |  0.65    Ca    8.3<L>      27 Aug 2023 11:23  Phos  2.6     08-27  Mg     2.2     08-27    TPro  5.0<L>  /  Alb  3.2<L>  /  TBili  0.7  /  DBili  x   /  AST  47<H>  /  ALT  21  /  AlkPhos  59  08-27    eGFR: 92 mL/min/1.73m2 (27 Aug 2023 11:23)      Thyroid Function Tests:      A1C with Estimated Average Glucose Result: 9.1 % (06-17-23 @ 10:27)    Estimated Average Glucose: 214 mg/dL (06-17-23 @ 10:27)        Diet, Regular:   Consistent Carbohydrate Evening Snack (CSTCHOSN)  Supplement Feeding Modality:  Oral  Glucerna Shake Cans or Servings Per Day:  3       Frequency:  Three Times a day (08-25-23 @ 14:57) [Active]

## 2023-08-27 NOTE — PROGRESS NOTE ADULT - ASSESSMENT
72 F w/h/o uncontrolled T2DM (A1C 9.4%) on basal/bolus insulin PTA. Unknown DM complications. Also COPD, secondary adrenal Insufficiency on chronic steroids, colorectal cancer s/p resection (colostomy bag), Chronic A fib on Eliquis, and tracheomalacia and multiple intubations, type A aortic dissection s/p aortic dissection repair on 9/07/22, sepsis. Pt well known to this provider from prior admissions. Here with SOB> treated for PNA and on Prednisolone to 24mg, also found to have anemia and  severe aortic stenosis> hematology and ct surgery following pt. Endocrine consulted for assistance with uncontrolled DM. BG Goal 100-180mg/dl. On steroid slow taper ( on chronic steroid ).  Most of BGs above goal with fasting . No hypoglycemia episode. will adjust basal and bolus insulin     Will start Freestyle Luis E 3 if pt going home. Needs keshia on phone but pt states she wants daughter to do it.

## 2023-08-27 NOTE — PROGRESS NOTE ADULT - ASSESSMENT
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 P/w SOB, found to have bronchial secretions on CT, admit to medicine for exacerbation of bronchiectasis. Sputum culture growing MRSA, completed 7 days linezolid. Anemic, possibly hemolytic from TAVR. Deconditioning, planning for dc to [facility].

## 2023-08-27 NOTE — PROGRESS NOTE ADULT - TIME BILLING
Medical management as above, reviewing chart and coordinating care with primary team/staff, as well as reviewing vitals, radiology, medication list, recent labs, and prior records.    Does not include teaching time.
Medical management as above, reviewing chart and coordinating care with primary team/staff, as well as reviewing vitals, radiology, medication list, recent labs, and prior records.    Does not include teaching time.
Review of medical records, labs, imaging and coordination of care with primary team.
Review of medical records, labs, imaging and coordination of care.
Review of medical record, labs , imaging and coordination of care.
Medical management as above, reviewing chart and coordinating care with primary team/staff, as well as reviewing vitals, radiology, medication list, recent labs, and prior records.    Does not include teaching time.
Review of medical records, imaging and coordination of care.
Review of medical records, labs, imaging and coordination of care with primary team.
- Ordering, reviewing, and interpreting labs, testing, and imaging.  - Independently obtaining a review of systems and performing a physical exam  - Reviewing consultant documentation/recommendations  - Counselling and educating patient regarding interpretation of aforementioned items and plan of care.  - Does not include time spent on resident education.
Time-based billing (NON-critical care).     The necessity of the time spent during the encounter on this date of service was due to:     - Ordering, reviewing, and interpreting labs, testing, and imaging.  - Independently obtaining a review of systems and performing a physical exam  - Reviewing prior hospitalization and where necessary, outpatient records.  - Counselling and educating patient and family regarding interpretation of aforementioned items and plan of care.
Time-based billing (NON-critical care).     The necessity of the time spent during the encounter on this date of service was due to:     - Ordering, reviewing, and interpreting labs, testing, and imaging.  - Independently obtaining a review of systems and performing a physical exam  - Reviewing prior hospitalization and where necessary, outpatient records.  - Counselling and educating patient regarding interpretation of aforementioned items and plan of care.
- Ordering, reviewing, and interpreting labs, testing, and imaging.  - Independently obtaining a review of systems and performing a physical exam  - Reviewing prior records and where necessary, outpatient records.  - Counselling and educating patient and family regarding interpretation of aforementioned items and plan of care.  - Does not include time spent on resident education.

## 2023-08-27 NOTE — PROGRESS NOTE ADULT - SUBJECTIVE AND OBJECTIVE BOX
Lázaro Molina, PGY-1  Please contact on Teams    RAYRAY RODRIGUEZ  72y  Female    Reason for Admission:       SUBJECTIVE/INTERVAL EVENTS: No acute events. Pt seen and examined at bedside.    PHYSICAL EXAM:  GENERAL: NAD, lying comfortably in bed   HEAD:  Atraumatic, Normocephalic  EYES: EOMI b/l, PERRLA b/l, conjunctiva and sclera clear  NECK: Supple, No JVD, No LAD   CHEST/LUNG: coarse breath sounds, similar to prior  HEART: Regular rate and rhythm; S1 and S2 present  ABDOMEN: Soft, Nontender, Nondistended; Bowel sounds present  EXTREMITIES:  No clubbing, cyanosis, or edema  NEURO: AAOx3, non-focal   SKIN: No rashes     Vital Signs Last 24 Hrs  T(C): 36.9 (27 Aug 2023 06:35), Max: 36.9 (26 Aug 2023 15:03)  T(F): 98.5 (27 Aug 2023 06:35), Max: 98.5 (26 Aug 2023 15:03)  HR: 78 (27 Aug 2023 06:35) (78 - 86)  BP: 96/58 (27 Aug 2023 06:35) (96/58 - 135/69)  BP(mean): --  RR: 18 (27 Aug 2023 06:35) (18 - 20)  SpO2: 99% (27 Aug 2023 06:35) (99% - 100%)    Parameters below as of 27 Aug 2023 06:35  Patient On (Oxygen Delivery Method): room air        Consultant(s) Notes Reviewed:  [x] YES  [ ] NO  Care Discussed with Consultants/Other Providers [x] YES  [ ] NO    LABS:                        7.7    13.95 )-----------( 188      ( 26 Aug 2023 07:19 )             25.7                         6.9    12.96 )-----------( 216      ( 25 Aug 2023 11:38 )             24.0                         7.4    11.97 )-----------( 217      ( 24 Aug 2023 12:04 )             25.9         Urinalysis Basic - ( 26 Aug 2023 07:20 )    Color: x / Appearance: x / SG: x / pH: x  Gluc: 132 mg/dL / Ketone: x  / Bili: x / Urobili: x   Blood: x / Protein: x / Nitrite: x   Leuk Esterase: x / RBC: x / WBC x   Sq Epi: x / Non Sq Epi: x / Bacteria: x        RADIOLOGY & ADDITIONAL TESTS:    Imaging Personally Reviewed:  [x] YES  [ ] NO    MEDICATIONS  (STANDING):  albuterol/ipratropium for Nebulization 3 milliLiter(s) Nebulizer every 6 hours  apixaban 5 milliGRAM(s) Oral every 12 hours  atorvastatin 20 milliGRAM(s) Oral at bedtime  buDESOnide    Inhalation Suspension 0.5 milliGRAM(s) Inhalation two times a day  chlorhexidine 4% Liquid 1 Application(s) Topical daily  dextrose 5%. 1000 milliLiter(s) (100 mL/Hr) IV Continuous <Continuous>  dextrose 5%. 1000 milliLiter(s) (50 mL/Hr) IV Continuous <Continuous>  dextrose 50% Injectable 25 Gram(s) IV Push once  dextrose 50% Injectable 12.5 Gram(s) IV Push once  dextrose 50% Injectable 25 Gram(s) IV Push once  folic acid 1 milliGRAM(s) Oral daily  glucagon  Injectable 1 milliGRAM(s) IntraMuscular once  insulin glargine Injectable (LANTUS) 28 Unit(s) SubCutaneous at bedtime  insulin lispro (ADMELOG) corrective regimen sliding scale   SubCutaneous <User Schedule>  insulin lispro (ADMELOG) corrective regimen sliding scale   SubCutaneous three times a day before meals  insulin lispro Injectable (ADMELOG) 14 Unit(s) SubCutaneous before dinner  insulin lispro Injectable (ADMELOG) 14 Unit(s) SubCutaneous before breakfast  insulin lispro Injectable (ADMELOG) 16 Unit(s) SubCutaneous before lunch  methylPREDNISolone   Oral   methylPREDNISolone 24 milliGRAM(s) Oral daily  mexiletine 200 milliGRAM(s) Oral every 8 hours  nystatin    Suspension 224768 Unit(s) Swish and Swallow two times a day  pantoprazole    Tablet 40 milliGRAM(s) Oral before breakfast  polyethylene glycol 3350 17 Gram(s) Oral two times a day  senna 2 Tablet(s) Oral at bedtime  sodium chloride 7% Inhalation 4 milliLiter(s) Inhalation every 6 hours  tiotropium 2.5 MICROgram(s) Inhaler 2 Puff(s) Inhalation daily  trimethoprim  160 mG/sulfamethoxazole 800 mG 1 Tablet(s) Oral daily    MEDICATIONS  (PRN):  acetaminophen     Tablet .. 650 milliGRAM(s) Oral every 6 hours PRN Temp greater or equal to 38C (100.4F), Mild Pain (1 - 3)  aluminum hydroxide/magnesium hydroxide/simethicone Suspension 30 milliLiter(s) Oral every 4 hours PRN Dyspepsia  dextrose Oral Gel 15 Gram(s) Oral once PRN Blood Glucose LESS THAN 70 milliGRAM(s)/deciliter  diphenhydrAMINE 25 milliGRAM(s) Oral daily PRN Allergy symptoms  melatonin 3 milliGRAM(s) Oral at bedtime PRN Insomnia  ondansetron Injectable 4 milliGRAM(s) IV Push every 8 hours PRN Nausea and/or Vomiting      HEALTH ISSUES - PROBLEM Dx:  Atrial fibrillation  paroxysmal, on eliquis    Diabetes mellitus    Prophylactic measure    Acute asthma exacerbation    SIRS without infection or organ dysfunction    Acute exacerbation of bronchiectasis    Uncontrolled type 2 diabetes mellitus with hyperglycemia    Essential hypertension    Hyperlipidemia    H/O adrenal insufficiency    Anemia    Aortic stenosis

## 2023-08-27 NOTE — PROGRESS NOTE ADULT - PROBLEM SELECTOR PLAN 1
Test BG ac and hs and 2am while on steroids  Increase Lantus to 30 units qhs for now  Adjust Admelog dose to 16-16-16 units qac for now.  Hold if NPO or eating less than 50% of meals.  C/w Mod correction scale qac + bedtime+ check 2 am BG  Please contact endo team with any changes on steroid doses ( noted Methylprednisolone dose decrease to 16 mg on8/30 and down to 8mg on 9/6)  - please give methylprednisolone after AM BG check to evaluate fasting BG better ( spoke with RN )  - Please offer patient evening snacks ( bedside RN aware)     Discharge:  Will be determined according to BG/insulin needs/PO intake and steroid therapy at time of discharge.  Plan to d/c on basal bolus. Doses TBD  Outpt. endo follow-up. Dr Saavedra  Outpt. optho, podiatry, micro/cr  Make sure pt has Rx for all DM supplies and insulin/ DM meds. Consider [facility] if going home

## 2023-08-27 NOTE — PROGRESS NOTE ADULT - PROBLEM SELECTOR PLAN 1
- CT chest showed increased bronchial secretions, on RA saturating well, speaking in full sentences, unclear trigger, likely due to infection, or inflammation.     - sputum culture grew MRSA, s/p 7 day course of ertapenem, followed by another 7 days course of linezolid   - hypertonic saline 7% and albuterol neb q6h standing, followed by use of airway clearance device (patient brought her own from home)  - chest PT as tolerated   - budesonide 0.5mg neb q12h standing   - now on solumedrol taper - 24mg, decrease by 8mg every week to 8 mg  - continue with bactrim for PCP ppx   - pulm following, appreciate recs  - echo: hyperdynamic LV, likely normal RV function - CT chest showed increased bronchial secretions, on RA saturating well, speaking in full sentences, unclear trigger, likely due to infection, or inflammation.     - sputum culture grew MRSA, s/p 7 day course of ertapenem, followed by another 7 days course of linezolid   - hypertonic saline 7% and albuterol neb q6h standing, followed by use of airway clearance device (patient brought her own from home)  - chest PT as tolerated   - budesonide 0.5mg neb q12h standing   - now on solumedrol taper - 24mg, decrease by 8mg every week to 8 mg  - continue with bactrim for PCP ppx   - pulm following, appreciate recs  - echo: hyperdynamic LV, likely normal RV function.

## 2023-08-28 DIAGNOSIS — T82.857A STENOSIS OF OTHER CARDIAC PROSTHETIC DEVICES, IMPLANTS AND GRAFTS, INITIAL ENCOUNTER: ICD-10-CM

## 2023-08-28 LAB
ALBUMIN SERPL ELPH-MCNC: 3.6 G/DL — SIGNIFICANT CHANGE UP (ref 3.3–5)
ALP SERPL-CCNC: 69 U/L — SIGNIFICANT CHANGE UP (ref 40–120)
ALT FLD-CCNC: 28 U/L — SIGNIFICANT CHANGE UP (ref 10–45)
ANION GAP SERPL CALC-SCNC: 12 MMOL/L — SIGNIFICANT CHANGE UP (ref 5–17)
AST SERPL-CCNC: 58 U/L — HIGH (ref 10–40)
BILIRUB SERPL-MCNC: 0.7 MG/DL — SIGNIFICANT CHANGE UP (ref 0.2–1.2)
BUN SERPL-MCNC: 16 MG/DL — SIGNIFICANT CHANGE UP (ref 7–23)
CALCIUM SERPL-MCNC: 8.9 MG/DL — SIGNIFICANT CHANGE UP (ref 8.4–10.5)
CHLORIDE SERPL-SCNC: 104 MMOL/L — SIGNIFICANT CHANGE UP (ref 96–108)
CO2 SERPL-SCNC: 25 MMOL/L — SIGNIFICANT CHANGE UP (ref 22–31)
CREAT SERPL-MCNC: 0.79 MG/DL — SIGNIFICANT CHANGE UP (ref 0.5–1.3)
CULTURE RESULTS: SIGNIFICANT CHANGE UP
EGFR: 78 ML/MIN/1.73M2 — SIGNIFICANT CHANGE UP
GLUCOSE BLDC GLUCOMTR-MCNC: 112 MG/DL — HIGH (ref 70–99)
GLUCOSE BLDC GLUCOMTR-MCNC: 149 MG/DL — HIGH (ref 70–99)
GLUCOSE BLDC GLUCOMTR-MCNC: 181 MG/DL — HIGH (ref 70–99)
GLUCOSE BLDC GLUCOMTR-MCNC: 242 MG/DL — HIGH (ref 70–99)
GLUCOSE BLDC GLUCOMTR-MCNC: 299 MG/DL — HIGH (ref 70–99)
GLUCOSE SERPL-MCNC: 133 MG/DL — HIGH (ref 70–99)
HCT VFR BLD CALC: 28.4 % — LOW (ref 34.5–45)
HGB BLD-MCNC: 8.4 G/DL — LOW (ref 11.5–15.5)
MAGNESIUM SERPL-MCNC: 2 MG/DL — SIGNIFICANT CHANGE UP (ref 1.6–2.6)
MCHC RBC-ENTMCNC: 23.2 PG — LOW (ref 27–34)
MCHC RBC-ENTMCNC: 29.6 GM/DL — LOW (ref 32–36)
MCV RBC AUTO: 78.5 FL — LOW (ref 80–100)
NRBC # BLD: 30 /100 WBCS — HIGH (ref 0–0)
PHOSPHATE SERPL-MCNC: 3.3 MG/DL — SIGNIFICANT CHANGE UP (ref 2.5–4.5)
PLATELET # BLD AUTO: 188 K/UL — SIGNIFICANT CHANGE UP (ref 150–400)
POTASSIUM SERPL-MCNC: 4.4 MMOL/L — SIGNIFICANT CHANGE UP (ref 3.5–5.3)
POTASSIUM SERPL-SCNC: 4.4 MMOL/L — SIGNIFICANT CHANGE UP (ref 3.5–5.3)
PROT SERPL-MCNC: 5.7 G/DL — LOW (ref 6–8.3)
RBC # BLD: 3.62 M/UL — LOW (ref 3.8–5.2)
RBC # FLD: 33.6 % — HIGH (ref 10.3–14.5)
SODIUM SERPL-SCNC: 141 MMOL/L — SIGNIFICANT CHANGE UP (ref 135–145)
SPECIMEN SOURCE: SIGNIFICANT CHANGE UP
WBC # BLD: 21.03 K/UL — HIGH (ref 3.8–10.5)
WBC # FLD AUTO: 21.03 K/UL — HIGH (ref 3.8–10.5)

## 2023-08-28 PROCEDURE — 99233 SBSQ HOSP IP/OBS HIGH 50: CPT | Mod: GC

## 2023-08-28 RX ORDER — INSULIN GLARGINE 100 [IU]/ML
15 INJECTION, SOLUTION SUBCUTANEOUS AT BEDTIME
Refills: 0 | Status: DISCONTINUED | OUTPATIENT
Start: 2023-08-28 | End: 2023-08-29

## 2023-08-28 RX ADMIN — Medication 3 MILLILITER(S): at 12:46

## 2023-08-28 RX ADMIN — SODIUM CHLORIDE 4 MILLILITER(S): 9 INJECTION INTRAMUSCULAR; INTRAVENOUS; SUBCUTANEOUS at 23:54

## 2023-08-28 RX ADMIN — Medication 1 TABLET(S): at 12:46

## 2023-08-28 RX ADMIN — PANTOPRAZOLE SODIUM 40 MILLIGRAM(S): 20 TABLET, DELAYED RELEASE ORAL at 06:34

## 2023-08-28 RX ADMIN — Medication 3 MILLILITER(S): at 06:33

## 2023-08-28 RX ADMIN — Medication 500000 UNIT(S): at 18:26

## 2023-08-28 RX ADMIN — SENNA PLUS 2 TABLET(S): 8.6 TABLET ORAL at 21:53

## 2023-08-28 RX ADMIN — POLYETHYLENE GLYCOL 3350 17 GRAM(S): 17 POWDER, FOR SOLUTION ORAL at 06:33

## 2023-08-28 RX ADMIN — MEXILETINE HYDROCHLORIDE 200 MILLIGRAM(S): 150 CAPSULE ORAL at 06:34

## 2023-08-28 RX ADMIN — Medication 500000 UNIT(S): at 06:34

## 2023-08-28 RX ADMIN — CHLORHEXIDINE GLUCONATE 1 APPLICATION(S): 213 SOLUTION TOPICAL at 12:47

## 2023-08-28 RX ADMIN — Medication 24 MILLIGRAM(S): at 09:37

## 2023-08-28 RX ADMIN — Medication 1 MILLIGRAM(S): at 12:46

## 2023-08-28 RX ADMIN — Medication 16 UNIT(S): at 09:38

## 2023-08-28 RX ADMIN — POLYETHYLENE GLYCOL 3350 17 GRAM(S): 17 POWDER, FOR SOLUTION ORAL at 18:27

## 2023-08-28 RX ADMIN — SODIUM CHLORIDE 4 MILLILITER(S): 9 INJECTION INTRAMUSCULAR; INTRAVENOUS; SUBCUTANEOUS at 06:33

## 2023-08-28 RX ADMIN — Medication 0.5 MILLIGRAM(S): at 06:33

## 2023-08-28 RX ADMIN — SODIUM CHLORIDE 4 MILLILITER(S): 9 INJECTION INTRAMUSCULAR; INTRAVENOUS; SUBCUTANEOUS at 12:47

## 2023-08-28 RX ADMIN — INSULIN GLARGINE 15 UNIT(S): 100 INJECTION, SOLUTION SUBCUTANEOUS at 22:23

## 2023-08-28 RX ADMIN — MEXILETINE HYDROCHLORIDE 200 MILLIGRAM(S): 150 CAPSULE ORAL at 13:56

## 2023-08-28 RX ADMIN — Medication 16 UNIT(S): at 13:55

## 2023-08-28 RX ADMIN — SODIUM CHLORIDE 4 MILLILITER(S): 9 INJECTION INTRAMUSCULAR; INTRAVENOUS; SUBCUTANEOUS at 18:26

## 2023-08-28 RX ADMIN — Medication 3 MILLILITER(S): at 18:26

## 2023-08-28 RX ADMIN — ATORVASTATIN CALCIUM 20 MILLIGRAM(S): 80 TABLET, FILM COATED ORAL at 21:53

## 2023-08-28 RX ADMIN — TIOTROPIUM BROMIDE 2 PUFF(S): 18 CAPSULE ORAL; RESPIRATORY (INHALATION) at 12:47

## 2023-08-28 RX ADMIN — Medication 0.5 MILLIGRAM(S): at 18:25

## 2023-08-28 RX ADMIN — MEXILETINE HYDROCHLORIDE 200 MILLIGRAM(S): 150 CAPSULE ORAL at 21:53

## 2023-08-28 RX ADMIN — APIXABAN 5 MILLIGRAM(S): 2.5 TABLET, FILM COATED ORAL at 06:34

## 2023-08-28 RX ADMIN — Medication 16 UNIT(S): at 18:17

## 2023-08-28 RX ADMIN — APIXABAN 5 MILLIGRAM(S): 2.5 TABLET, FILM COATED ORAL at 18:26

## 2023-08-28 RX ADMIN — Medication 6: at 18:22

## 2023-08-28 RX ADMIN — Medication 3 MILLILITER(S): at 23:54

## 2023-08-28 NOTE — PROGRESS NOTE ADULT - ASSESSMENT
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 P/w SOB, found to have bronchial secretions on CT, admit to medicine for exacerbation of bronchiectasis. Sputum culture growing MRSA, completed 7 days linezolid. Anemic, possibly hemolytic from TAVR. Deconditioning, planning for dc to [facility].

## 2023-08-28 NOTE — CONSULT NOTE ADULT - CONSULT REASON
Severe Bioprosthetic Aortic Stenosis / Evaluation for Cardiac Surgery
bronchiectasis
wounds
pulm infection
Anemia
Aortic Stenosis
Uncontrolled Type 2 DM w/ hyperglycemia exacerbated by steroids

## 2023-08-28 NOTE — PROGRESS NOTE ADULT - PROBLEM SELECTOR PLAN 2
- MCV 77.8, haptoglobin low, LDH high, normal Tbili, Plt w/n range, no bruising, skin changes, no splenomegaly  - anemia workup c/f hemolysis, heme and CTS consulted  - given darby negative, more likely related to bioprosthetic AV stenosis   - patient currently has a bioprosthetic Aortic Valve and recent echo suggests aortic stenosis   - CTS consulted, not a candidate for open surgical repair  - structural heart team to be consulted for possible valve in valve TAVR on Monday. 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  If so, she will need a Gated Cardiac CT to look at valvular anatomy, as well as access points.

## 2023-08-28 NOTE — CONSULT NOTE ADULT - PROBLEM SELECTOR RECOMMENDATION 9
We were asked to evaluate the patient for possible TAV in JENNIFER for a prosthetic Aortic Valve with Stenosis. She had a Bental Procedure last year with Dr. Cabrera. We will review her TTE to see if valve is truly stenotic. If so, she will need a Gated Cardiac Ct that we will need to get to look at her valvular anatomy, as well as access points. I will discuss with Dr. Leahy and Deborah. Patient is aware of what plan is, and is in agreement. We were asked to evaluate the patient for possible TAV in JENNIFER for a prosthetic Aortic Valve with Stenosis. She had a Bental Procedure last year with Dr. Cabrera. We will review her TTE, but we recommend that she get a SAFIA to get a better look at the valve to see if it is truly stenotic.  If so, she will need a Gated Cardiac Ct that we will need to get to look at her valvular anatomy, as well as access points. I will discuss with Dr. Leahy and Deborah. Patient is aware of what plan is, and is in agreement.

## 2023-08-28 NOTE — PROGRESS NOTE ADULT - SUBJECTIVE AND OBJECTIVE BOX
Patient is a 72y old  Female who presents with a chief complaint of Type A aortic dissection (07 Nov 2022 14:07)       INTERVAL HPI/OVERNIGHT EVENTS:  Patient seen and evaluated at bedside.  Pt is resting comfortable in NAD.     Allergies    aspirin (Short breath)  Avelox (Short breath; Pruritus)  cefepime (Anaphylaxis)  codeine (Short breath)  Dilaudid (Short breath)  iodine (Short breath; Swelling)  penicillin (Anaphylaxis)  shellfish (Anaphylaxis)  tetanus toxoid (Short breath)  Valium (Short breath)    Intolerances        Vital Signs Last 24 Hrs  T(C): 37 (07 Nov 2022 20:00), Max: 37 (07 Nov 2022 20:00)  T(F): 98.6 (07 Nov 2022 20:00), Max: 98.6 (07 Nov 2022 20:00)  HR: 80 (08 Nov 2022 02:00) (64 - 91)  BP: 113/92 (08 Nov 2022 02:00) (109/51 - 166/75)  BP(mean): 97 (08 Nov 2022 02:00) (72 - 117)  RR: 36 (08 Nov 2022 02:00) (22 - 37)  SpO2: 100% (08 Nov 2022 02:00) (97% - 100%)    Parameters below as of 07 Nov 2022 23:10  Patient On (Oxygen Delivery Method): tracheostomy collar        LABS:                        10.0   12.36 )-----------( 221      ( 08 Nov 2022 00:37 )             33.4     11-08    136  |  100  |  16  ----------------------------<  180<H>  4.7   |  22  |  0.42<L>    Ca    9.0      08 Nov 2022 00:35  Phos  3.8     11-08  Mg     1.8     11-08    TPro  6.7  /  Alb  2.9<L>  /  TBili  0.3  /  DBili  x   /  AST  25  /  ALT  15  /  AlkPhos  123<H>  11-08        CAPILLARY BLOOD GLUCOSE      POCT Blood Glucose.: 187 mg/dL (08 Nov 2022 00:06)  POCT Blood Glucose.: 89 mg/dL (07 Nov 2022 17:18)  POCT Blood Glucose.: 224 mg/dL (07 Nov 2022 11:43)  POCT Blood Glucose.: 143 mg/dL (07 Nov 2022 06:52)      Lower Extremity Physical Exam:  Vascular: DP/PT 2/4 B/L, CFT <3 seconds B/L, Temperature gradient warm to cool B/L  Neuro: Epicritic sensation intact to the level of midfoot B/L  Musculoskeletal/Ortho: unremarkable   Skin: right foot with two navicular wounds with fibrous wound bed, no clinical signs of infection. L foot with no clinical signs of infection.   [Obese] : obese [Normal] : affect appropriate [de-identified] : incisions healing well, dermabond intact. no evidence of bleeding, oozing, or infection

## 2023-08-28 NOTE — PROGRESS NOTE ADULT - PROBLEM SELECTOR PLAN 1
- CT chest showed increased bronchial secretions, on RA saturating well, speaking in full sentences, unclear trigger, likely due to infection, or inflammation.     - sputum culture grew MRSA, s/p 7 day course of ertapenem, followed by another 7 days course of linezolid   - hypertonic saline 7% and albuterol neb q6h standing, followed by use of airway clearance device (patient brought her own from home)  - chest PT as tolerated   - budesonide 0.5mg neb q12h standing   - now on solumedrol taper - 24mg, decrease by 8mg every week to 8 mg  - continue with bactrim for PCP ppx   - pulm following, appreciate recs  - echo: hyperdynamic LV, likely normal RV function. - CT chest showed increased bronchial secretions, on RA saturating well, speaking in full sentences, unclear trigger, likely due to infection, or inflammation.     - sputum culture grew MRSA, s/p 7 day course of ertapenem, followed by another 7 days course of linezolid   - hypertonic saline 7% and albuterol neb q6h standing, followed by use of airway clearance device (patient brought her own from home)  - chest PT as tolerated   - budesonide 0.5mg neb q12h standing   - now on solumedrol taper - 24mg, decrease by 8mg every week to 8 mg  - continue with bactrim for PCP ppx   - pulm following, appreciate recs  - ID following, WBC likely in relation to steroids  - echo: hyperdynamic LV, likely normal RV function.

## 2023-08-28 NOTE — CONSULT NOTE ADULT - PROBLEM SELECTOR PROBLEM 1
Uncontrolled type 2 diabetes mellitus with hyperglycemia
Prosthetic aortic valve stenosis
Aortic stenosis

## 2023-08-28 NOTE — CONSULT NOTE ADULT - SUBJECTIVE AND OBJECTIVE BOX
Structural Heart Team    HPI:  73 yo PMHx  asthma on chronic steroids, bronchiectasis s/p surgery, allergic rhinitis,  recurrent PNA, PND, tracheomalacia s/p tracheoplasty, ESBL proteus infections (sputum),  diabetes, paroxysmal A-fib on Eliquis, colorectal cancer many years ago status post colostomy presenting with concern for shortness of breath. It started 2 and half weeks ago with dysnea on exertion, increased sputum production and running nose. She also endorsed sweatiness, chill, abd pain, denied fever, n/v/c/d, sick contact. she is up to date with vaccines. she has had chronic shortness of breath and cough for years. SHe had a recent hospitalization at an outside hospital  for acute respiratory failure with hypoxia secondary to asthma/COPD exacerbation and bronchopneumonia 6/5/2023 - 6/16/2023), She was found to have ESBL/Proteus/yeast in sputum culture and was treated with ertapenem/Benadryl/vancomycin/aztreonam and methylprednisolone.   She was discharged home with a midline and completed a course of antibiotics ertapenem (last dose 06/25).Dr. Allison also prescribed tobramycin after discharge,  and her metylprednisolone dose was reduced from 32 to 28 mg po daily from last monday to this monday. She states since dose reduction she has had worsened dyspnea. when she called Dr. Allison's office, he urged the patient to come to the ED for further management.     Of note had chronic dyspnea/severe asthma since childhood. She states she had a severe sinus infection requiring surgery at ?10 yo and since then has had difficulties with breathing. She has been on chronic steroids for over 20 years per report.. She uses Breo, Spiriva, albuterol neb and a cough assist device.     In the ED, BP stable 121/65, RR 22, saturating at RA at 100%, 97.5 temp. CBC has 10.56 HGB 10, platelet 227. chemistry normal except glucose 272. procal 0.13, trop 28. Last A1C 9.1 VBG 7.34, pCO2 is 52. CT showed new mucous secretions in the bronchus intermedius.Unchanged infectious/inflammatory small airways disease in the right middle/lower lobes with multifocal small tree-in-bud nodules.Status post ascending aortic and aortic valve replacement, with residual dissection flap better seen on the prior study.Stable indeterminate small left renal nodule and numerous pancreatic cysts. In the ED, she was on Duonebs, LR 500cc, ertapenem adn benadryl was given as well.     Today she is resting in a chair with nasal oxygen on, stating she is having some SOB. She reports that after her Bental Procedure last year, she initially felt good, but over the past couple of months, she has begun having SOB and weakness. She states that when she walks even 10-15 feet, she gets very weak and SOB. She denies any CP,or pedal edema, but she will get dizziness. Her HgB has been trending down also, so she has been treated for anemia, as well as PNA. She has a non productive loose cough, and she states she often feels like she has a belt that is tight around her lower chest that makes it difficult to take a deep breath.            08-27 @ 07:01  -  08-28 @ 07:00  --------------------------------------------------------  IN: 1198 mL / OUT: 0 mL / NET: 1198 mL        PAST MEDICAL & SURGICAL HISTORY:  Atrial fibrillation  paroxysmal, on eliquis      Diabetes  Type 2      COPD (chronic obstructive pulmonary disease)      Adrenal insufficiency  Medrol daily for over 50 years      Aortic insufficiency  moderate AR on echo 5/3/2018      Pelvic fracture      Asthma      Tracheobronchomalacia  diagnosed 2015, s/p bronchial thermoplasty 2016 (Dr Zapien); recent bronchoscopy 6/5/2018 revealed no evidence of tracheobronchomalacia in trachea or bronchial tubes      Colorectal cancer  4/2018- last treatment , chemo and radiation      Rectal bleeding      Seizure  x 1 1/7/18      DVT (deep venous thrombosis)  15-20 years ago, took coumadin      TIA (transient ischemic attack)  multiple, last 5 years ago - presents as right-sided weakness      History of partial hysterectomy  30 years ago - fibroids      H/O total knee replacement, bilateral  5 years ago      History of sinus surgery  multiple sinus surgeries      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      H/O pelvic surgery  5 years ago - s/p fracture      History of tracheomalacia  2015 - attempted tracheal stenting ([facility])- course complicated by obstruction, respiratory failure, multiple CPR attempts -  stent discontinued; 10/20/2016 Tracheobronchoplasty (Prolene Mesh) performed at [facility] by Dr Zapien      S/P bronchoscopy  6/5/2018 - [facility] (Dr Zapien) no evidence of tracheobronchomalacia in trachea or bronchial tubes      Rectal bleeding  exam under anesthesia ([facility]) 2/2018          FAMILY HISTORY:  Family history of asthma    Family history of breast cancer (Sibling)    Family history of diabetes mellitus type II              tetanus toxoid (Short breath)  cefepime (Anaphylaxis)  penicillin (Anaphylaxis)  Avelox (Short breath; Pruritus)  Dilaudid (Short breath)  codeine (Short breath)  aspirin (Short breath)  iodine (Short breath; Swelling)  Valium (Short breath)  shellfish (Anaphylaxis)    albuterol/ipratropium for Nebulization 3 milliLiter(s) Nebulizer every 6 hours  apixaban 5 milliGRAM(s) Oral every 12 hours  atorvastatin 20 milliGRAM(s) Oral at bedtime  buDESOnide    Inhalation Suspension 0.5 milliGRAM(s) Inhalation two times a day  folic acid 1 milliGRAM(s) Oral daily  glucagon  Injectable 1 milliGRAM(s) IntraMuscular once  insulin glargine Injectable (LANTUS) 30 Unit(s) SubCutaneous at bedtime  insulin lispro (ADMELOG) corrective regimen sliding scale   SubCutaneous <User Schedule>  insulin lispro (ADMELOG) corrective regimen sliding scale   SubCutaneous three times a day before meals  insulin lispro Injectable (ADMELOG) 16 Unit(s) SubCutaneous before lunch  insulin lispro Injectable (ADMELOG) 16 Unit(s) SubCutaneous before breakfast  insulin lispro Injectable (ADMELOG) 16 Unit(s) SubCutaneous before dinner  methylPREDNISolone   Oral   methylPREDNISolone 24 milliGRAM(s) Oral daily  mexiletine 200 milliGRAM(s) Oral every 8 hours  nystatin    Suspension 340522 Unit(s) Swish and Swallow two times a day  pantoprazole    Tablet 40 milliGRAM(s) Oral before breakfast  polyethylene glycol 3350 17 Gram(s) Oral two times a day  senna 2 Tablet(s) Oral at bedtime  sodium chloride 7% Inhalation 4 milliLiter(s) Inhalation every 6 hours  tiotropium 2.5 MICROgram(s) Inhaler 2 Puff(s) Inhalation daily  trimethoprim  160 mG/sulfamethoxazole 800 mG 1 Tablet(s) Oral daily      T(C): 36.7 (08-28-23 @ 06:45), Max: 37.1 (08-27-23 @ 21:37)  HR: 84 (08-28-23 @ 06:45) (80 - 84)  BP: 132/72 (08-28-23 @ 06:45) (104/68 - 132/72)  RR: 18 (08-28-23 @ 06:45) (18 - 20)  SpO2: 100% (08-28-23 @ 06:45) (98% - 100%)  Wt(kg): --      Review of Symptoms:  General: Alert, Follows commands  Respiratory:  + SOB, + KRAMER, + Cough  Cardiac: Denies CP, Denies Palpitations  Gastrointestinal: Denies Pain, Denies N/V  Extremities: Denies Pedal Edema, No Joint pain  Vascular: Negative  Neurological: occasional pedal parasthesia  Endocrine: negative      Physical Exam:  Gen: A/Ox3, NAD  HEENT: No JVD, Neck Supple, Trachea midline, No masses  Pulmonary: B/L fine wheezing, + Loose cough,  No accessory muscle use for respiration  Cardiac: S1S2, RRR, II/VI BESSY   No Gallops/Rubs  ECG: SR  Gastrointestinal: Soft, NT/ND, + Bowel Sounds  Extremities:  No Edema,  No joint pain or swelling +PMSx4  Vascular: 1+ pulses B/L, No Bruits  Neurological: Non Focal, = motor and sensory      Laboratory:                        8.4    21.03 )-----------( 188      ( 28 Aug 2023 10:44 )             28.4    08-28    141  |  104  |  16  ----------------------------<  133<H>  4.4   |  25  |  0.79    Ca    8.9      28 Aug 2023 10:44  Phos  3.3     08-28  Mg     2.0     08-28    TPro  5.7<L>  /  Alb  3.6  /  TBili  0.7  /  DBili  x   /  AST  58<H>  /  ALT  28  /  AlkPhos  69  08-28       Pro-BNP:        Transthoracic Echo:    < from: TTE W or WO Ultrasound Enhancing Agent (08.15.23 @ 09:29) >     CONCLUSIONS:      1. Normal left ventricular cavity size. Left ventricular wall thickness is normal. Left ventricular systolic function is hyperdynamic. There are no regional wall motion abnormalities seen.   2. The right ventricle is not well visualized. Normal right ventricular cavity size and probably normal right ventricular systolic function.   3. TAVR noted. Spectral Doppler of the LVOT is not sufficiently close to parallel to flow; velocity is likely underestimated.   4. If hemodyanmics of the aortic valve prosthesis are needed, please request arepeat limited study.    ________________________________________________________________________________________  FINDINGS:     Left Ventricle:  Normal left ventricular cavity size. Left ventricular wall thickness is normal. Left ventricular systolic function is hyperdynamic. There are no regional wall motion abnormalities seen. Normal filling pressure.     Right Ventricle:  The right ventricle is not well visualized. Normal right ventricular cavity size and probably normal right ventricular systolic function.     Left Atrium:  The left atrium is normal in size with an indexed volume of 35.18 ml/m².     Right Atrium:  The right atrium is normal in size with an indexed area of 6.05 cm²/m².     Aortic Valve:  ( a transcatheter deployed (TAVR)). TAVR noted. Spectral Doppler of the LVOT is not sufficiently close to parallel to flow; velocity is likely underestimated. The aortic valve was not well visualized.     Mitral Valve:  Structurally normal mitral valve with normal leaflet excursion. There is calcification of the mitral valve annulus. There is mild mitral regurgitation.     Tricuspid Valve:  Structurally normal tricuspid valve with normal leaflet excursion. There is trace tricuspid regurgitation.     Pulmonic Valve:  Structurally normal pulmonic valve with normal leaflet excursion.     Aorta:  The aortic annulus and aortic root appear normal in size.     Pericardium:  No pericardial effusion seen.     Systemic Veins:  The inferior vena cava is normal in size measuring 1.30 cm in diameter, (normal <2.1cm) with normal inspiratory collapse (normal >50%) consistent with normal right atrial pressure (~3, range 0-5mmHg).  ____________________________________________________________________  Quantitative Data:  Left Ventricle Measurements: (Indexed to BSA)     IVSd (2D):   1.2 cm  LVPWd (2D):  1.0 cm  LVIDd (2D):  4.2 cm  LVIDs (2D):  2.1 cm  LV Mass:     166 g  95.6 g/m²     MV E Vmax: 0.87 m/s  MV A Vmax: 0.88 m/s  MV E/A:    0.99  MV DT:     318 msec    Aorta Measurements: (normal range) (Indexed to BSA)     Sinuses of Valsalva: 3.20 cm (2.7 - 3.3 cm)cm  Ao Asc:              2.9 cm       LVOT / RVOT/ Qp/Qs Data: (Indexed to BSA)  LVOT Diameter: 2.01 cm    Aortic Valve Measurements:  AV Vmax:          2.9 m/s  AV Peak Gradient: 34.5 mmHg  AV Mean Gradient: 22.1 mmHg  AV VTI:           59.4 cm    Mitral Valve Measurements:     MV E Vmax: 0.9 m/s  MV A Vmax: 0.9 m/s  MV E/A:    1.0       Tricuspid Valve Measurements:     RA Pressure: 3 mmHg    ________________________________________________________________________________________  Electronically signed on 8/15/2023 at 6:58:46 PM by Allen Lam         < end of copied text >

## 2023-08-28 NOTE — PROGRESS NOTE ADULT - ATTENDING COMMENTS
72F  with history of bronchiectasis on chronic steroids,   s/p surgery, allergic rhinitis,  recurrent PNA, PND, tracheomalacia s/p tracheoplasty, ESBL proteus infections (sputum), T2DM, paroxysmal A-fib on Eliquis, colorectal cancer many years ago status post colostomy presenting with shortness of breath of 3 day duration. Prior issues with ESBL/Proteus/yeast in sputum culture and was treated with ertapenem/Benadryl/vancomycin/aztreonam and methylprednisolone. She was discharged home with a midline and completed a course of antibiotics ertapenem (last dose 06/25). She was also recently  treated for MRSA and pseudomonas in sputum and completed Tobra nebs and doxycycline.    1. Acute on chronic hypoxic RF due to exacerbation of bronchiectasis (on chronic steroid)  2. Recent tracheo-bronchitis due to ESBL proteus/MRSA/pseudomonas  3. Anemia, likely hemolysis and iron deficiency  4. Paroxysmal A-fib on Eliquis  5. H/o colorectal cancer, status post colostomy    Pt with elevated WBC today but respiratory exam stable  will follow up with Pulm and ID regarding further recommendations- finished abx but on prophylactic bactrim  Anemia improved today  Awaiting follow up from structural heart re TAVR 72F  with history of bronchiectasis on chronic steroids,   s/p surgery, allergic rhinitis,  recurrent PNA, PND, tracheomalacia s/p tracheoplasty, ESBL proteus infections (sputum), T2DM, paroxysmal A-fib on Eliquis, colorectal cancer many years ago status post colostomy presenting with shortness of breath of 3 day duration. Prior issues with ESBL/Proteus/yeast in sputum culture and was treated with ertapenem/Benadryl/vancomycin/aztreonam and methylprednisolone. She was discharged home with a midline and completed a course of antibiotics ertapenem (last dose 06/25). She was also recently  treated for MRSA and pseudomonas in sputum and completed Tobra nebs and doxycycline.    1. Acute on chronic hypoxic RF due to exacerbation of bronchiectasis (on chronic steroid)  2. Recent tracheo-bronchitis due to ESBL proteus/MRSA/pseudomonas  3. Anemia, likely hemolysis and iron deficiency  4. Paroxysmal A-fib on Eliquis  5. H/o colorectal cancer, status post colostomy    Pt with elevated WBC today but respiratory exam stable  will follow up with Pulm and ID regarding further recommendations- finished abx but on prophylactic bactrim  Anemia improved today  Awaiting follow up from structural heart re TAVR- now they recommend SAFIA

## 2023-08-28 NOTE — PROGRESS NOTE ADULT - SUBJECTIVE AND OBJECTIVE BOX
RAYRAY RODRIGUEZ  72y  Female    Reason for Admission:       SUBJECTIVE/INTERVAL EVENTS: No acute events. Pt seen and examined at bedside.    PHYSICAL EXAM:  GENERAL: NAD, lying comfortably in bed   HEAD:  Atraumatic, Normocephalic  EYES: EOMI b/l, PERRLA b/l, conjunctiva and sclera clear  NECK: Supple, No JVD, No LAD   CHEST/LUNG: coarse breath sounds, similar to prior  HEART: Regular rate and rhythm; S1 and S2 present  ABDOMEN: Soft, Nontender, Nondistended; Bowel sounds present  EXTREMITIES:  No clubbing, cyanosis, or edema  NEURO: AAOx3, non-focal   SKIN: No rashes     Vital Signs Last 24 Hrs  T(C): 36.9 (27 Aug 2023 06:35), Max: 36.9 (26 Aug 2023 15:03)  T(F): 98.5 (27 Aug 2023 06:35), Max: 98.5 (26 Aug 2023 15:03)  HR: 78 (27 Aug 2023 06:35) (78 - 86)  BP: 96/58 (27 Aug 2023 06:35) (96/58 - 135/69)  BP(mean): --  RR: 18 (27 Aug 2023 06:35) (18 - 20)  SpO2: 99% (27 Aug 2023 06:35) (99% - 100%)    Parameters below as of 27 Aug 2023 06:35  Patient On (Oxygen Delivery Method): room air        Consultant(s) Notes Reviewed:  [x] YES  [ ] NO  Care Discussed with Consultants/Other Providers [x] YES  [ ] NO    LABS:                        7.7    13.95 )-----------( 188      ( 26 Aug 2023 07:19 )             25.7                         6.9    12.96 )-----------( 216      ( 25 Aug 2023 11:38 )             24.0                         7.4    11.97 )-----------( 217      ( 24 Aug 2023 12:04 )             25.9         Urinalysis Basic - ( 26 Aug 2023 07:20 )    Color: x / Appearance: x / SG: x / pH: x  Gluc: 132 mg/dL / Ketone: x  / Bili: x / Urobili: x   Blood: x / Protein: x / Nitrite: x   Leuk Esterase: x / RBC: x / WBC x   Sq Epi: x / Non Sq Epi: x / Bacteria: x        RADIOLOGY & ADDITIONAL TESTS:    Imaging Personally Reviewed:  [x] YES  [ ] NO    MEDICATIONS  (STANDING):  albuterol/ipratropium for Nebulization 3 milliLiter(s) Nebulizer every 6 hours  apixaban 5 milliGRAM(s) Oral every 12 hours  atorvastatin 20 milliGRAM(s) Oral at bedtime  buDESOnide    Inhalation Suspension 0.5 milliGRAM(s) Inhalation two times a day  chlorhexidine 4% Liquid 1 Application(s) Topical daily  dextrose 5%. 1000 milliLiter(s) (100 mL/Hr) IV Continuous <Continuous>  dextrose 5%. 1000 milliLiter(s) (50 mL/Hr) IV Continuous <Continuous>  dextrose 50% Injectable 25 Gram(s) IV Push once  dextrose 50% Injectable 12.5 Gram(s) IV Push once  dextrose 50% Injectable 25 Gram(s) IV Push once  folic acid 1 milliGRAM(s) Oral daily  glucagon  Injectable 1 milliGRAM(s) IntraMuscular once  insulin glargine Injectable (LANTUS) 28 Unit(s) SubCutaneous at bedtime  insulin lispro (ADMELOG) corrective regimen sliding scale   SubCutaneous <User Schedule>  insulin lispro (ADMELOG) corrective regimen sliding scale   SubCutaneous three times a day before meals  insulin lispro Injectable (ADMELOG) 14 Unit(s) SubCutaneous before dinner  insulin lispro Injectable (ADMELOG) 14 Unit(s) SubCutaneous before breakfast  insulin lispro Injectable (ADMELOG) 16 Unit(s) SubCutaneous before lunch  methylPREDNISolone   Oral   methylPREDNISolone 24 milliGRAM(s) Oral daily  mexiletine 200 milliGRAM(s) Oral every 8 hours  nystatin    Suspension [id number] Unit(s) Swish and Swallow two times a day  pantoprazole    Tablet 40 milliGRAM(s) Oral before breakfast  polyethylene glycol 3350 17 Gram(s) Oral two times a day  senna 2 Tablet(s) Oral at bedtime  sodium chloride 7% Inhalation 4 milliLiter(s) Inhalation every 6 hours  tiotropium 2.5 MICROgram(s) Inhaler 2 Puff(s) Inhalation daily  trimethoprim  160 mG/sulfamethoxazole 800 mG 1 Tablet(s) Oral daily    MEDICATIONS  (PRN):  acetaminophen     Tablet .. 650 milliGRAM(s) Oral every 6 hours PRN Temp greater or equal to 38C (100.4F), Mild Pain (1 - 3)  aluminum hydroxide/magnesium hydroxide/simethicone Suspension 30 milliLiter(s) Oral every 4 hours PRN Dyspepsia  dextrose Oral Gel 15 Gram(s) Oral once PRN Blood Glucose LESS THAN 70 milliGRAM(s)/deciliter  diphenhydrAMINE 25 milliGRAM(s) Oral daily PRN Allergy symptoms  melatonin 3 milliGRAM(s) Oral at bedtime PRN Insomnia  ondansetron Injectable 4 milliGRAM(s) IV Push every 8 hours PRN Nausea and/or Vomiting      HEALTH ISSUES - PROBLEM Dx:  Atrial fibrillation  paroxysmal, on eliquis    Diabetes mellitus    Prophylactic measure    Acute asthma exacerbation    SIRS without infection or organ dysfunction    Acute exacerbation of bronchiectasis    Uncontrolled type 2 diabetes mellitus with hyperglycemia    Essential hypertension    Hyperlipidemia    H/O adrenal insufficiency    Anemia    Aortic stenosis             RAYRAY RODRIGUEZ  72y  Female    Reason for Admission: Exacerbation of bronchiectasis       SUBJECTIVE/INTERVAL EVENTS: No acute events. Pt seen and examined at bedside.    PHYSICAL EXAM:  GENERAL: NAD, lying comfortably in bed   HEAD:  Atraumatic, Normocephalic  EYES: EOMI b/l, PERRLA b/l, conjunctiva and sclera clear  NECK: Supple, No JVD, No LAD   CHEST/LUNG: coarse breath sounds, similar to prior  HEART: Regular rate and rhythm; S1 and S2 present  ABDOMEN: Soft, Nontender, Nondistended; Bowel sounds present  EXTREMITIES:  No clubbing, cyanosis, or edema  NEURO: AAOx3, non-focal   SKIN: No rashes     Vital Signs Last 24 Hrs  T(C): 36.9 (27 Aug 2023 06:35), Max: 36.9 (26 Aug 2023 15:03)  T(F): 98.5 (27 Aug 2023 06:35), Max: 98.5 (26 Aug 2023 15:03)  HR: 78 (27 Aug 2023 06:35) (78 - 86)  BP: 96/58 (27 Aug 2023 06:35) (96/58 - 135/69)  BP(mean): --  RR: 18 (27 Aug 2023 06:35) (18 - 20)  SpO2: 99% (27 Aug 2023 06:35) (99% - 100%)    Parameters below as of 27 Aug 2023 06:35  Patient On (Oxygen Delivery Method): room air        Consultant(s) Notes Reviewed:  [x] YES  [ ] NO  Care Discussed with Consultants/Other Providers [x] YES  [ ] NO    LABS:                        7.7    13.95 )-----------( 188      ( 26 Aug 2023 07:19 )             25.7                         6.9    12.96 )-----------( 216      ( 25 Aug 2023 11:38 )             24.0                         7.4    11.97 )-----------( 217      ( 24 Aug 2023 12:04 )             25.9         Urinalysis Basic - ( 26 Aug 2023 07:20 )    Color: x / Appearance: x / SG: x / pH: x  Gluc: 132 mg/dL / Ketone: x  / Bili: x / Urobili: x   Blood: x / Protein: x / Nitrite: x   Leuk Esterase: x / RBC: x / WBC x   Sq Epi: x / Non Sq Epi: x / Bacteria: x        RADIOLOGY & ADDITIONAL TESTS:    Imaging Personally Reviewed:  [x] YES  [ ] NO    MEDICATIONS  (STANDING):  albuterol/ipratropium for Nebulization 3 milliLiter(s) Nebulizer every 6 hours  apixaban 5 milliGRAM(s) Oral every 12 hours  atorvastatin 20 milliGRAM(s) Oral at bedtime  buDESOnide    Inhalation Suspension 0.5 milliGRAM(s) Inhalation two times a day  chlorhexidine 4% Liquid 1 Application(s) Topical daily  dextrose 5%. 1000 milliLiter(s) (100 mL/Hr) IV Continuous <Continuous>  dextrose 5%. 1000 milliLiter(s) (50 mL/Hr) IV Continuous <Continuous>  dextrose 50% Injectable 25 Gram(s) IV Push once  dextrose 50% Injectable 12.5 Gram(s) IV Push once  dextrose 50% Injectable 25 Gram(s) IV Push once  folic acid 1 milliGRAM(s) Oral daily  glucagon  Injectable 1 milliGRAM(s) IntraMuscular once  insulin glargine Injectable (LANTUS) 28 Unit(s) SubCutaneous at bedtime  insulin lispro (ADMELOG) corrective regimen sliding scale   SubCutaneous <User Schedule>  insulin lispro (ADMELOG) corrective regimen sliding scale   SubCutaneous three times a day before meals  insulin lispro Injectable (ADMELOG) 14 Unit(s) SubCutaneous before dinner  insulin lispro Injectable (ADMELOG) 14 Unit(s) SubCutaneous before breakfast  insulin lispro Injectable (ADMELOG) 16 Unit(s) SubCutaneous before lunch  methylPREDNISolone   Oral   methylPREDNISolone 24 milliGRAM(s) Oral daily  mexiletine 200 milliGRAM(s) Oral every 8 hours  nystatin    Suspension 869005 Unit(s) Swish and Swallow two times a day  pantoprazole    Tablet 40 milliGRAM(s) Oral before breakfast  polyethylene glycol 3350 17 Gram(s) Oral two times a day  senna 2 Tablet(s) Oral at bedtime  sodium chloride 7% Inhalation 4 milliLiter(s) Inhalation every 6 hours  tiotropium 2.5 MICROgram(s) Inhaler 2 Puff(s) Inhalation daily  trimethoprim  160 mG/sulfamethoxazole 800 mG 1 Tablet(s) Oral daily    MEDICATIONS  (PRN):  acetaminophen     Tablet .. 650 milliGRAM(s) Oral every 6 hours PRN Temp greater or equal to 38C (100.4F), Mild Pain (1 - 3)  aluminum hydroxide/magnesium hydroxide/simethicone Suspension 30 milliLiter(s) Oral every 4 hours PRN Dyspepsia  dextrose Oral Gel 15 Gram(s) Oral once PRN Blood Glucose LESS THAN 70 milliGRAM(s)/deciliter  diphenhydrAMINE 25 milliGRAM(s) Oral daily PRN Allergy symptoms  melatonin 3 milliGRAM(s) Oral at bedtime PRN Insomnia  ondansetron Injectable 4 milliGRAM(s) IV Push every 8 hours PRN Nausea and/or Vomiting      HEALTH ISSUES - PROBLEM Dx:  Atrial fibrillation  paroxysmal, on eliquis    Diabetes mellitus    Prophylactic measure    Acute asthma exacerbation    SIRS without infection or organ dysfunction    Acute exacerbation of bronchiectasis    Uncontrolled type 2 diabetes mellitus with hyperglycemia    Essential hypertension    Hyperlipidemia    H/O adrenal insufficiency    Anemia    Aortic stenosis             RAYRAY RODRIGUEZ  72y  Female    Reason for Admission: Exacerbation of bronchiectasis       SUBJECTIVE/INTERVAL EVENTS: No acute events. Pt seen and examined at bedside.    PHYSICAL EXAM:  GENERAL: NAD, lying in bed   HEAD:  Atraumatic, Normocephalic  EYES: EOMI b/l, PERRLA b/l, conjunctiva and sclera clear, arcus senalis both eyes, cornea cloudy in left eye  NECK: Supple, No JVD, No LAD   CHEST/LUNG: coarse breath sounds, similar to prior  HEART: Regular rate and rhythm; S1 and S2 present  ABDOMEN: Soft, Nontender, Nondistended; Bowel sounds present  EXTREMITIES:  No clubbing, cyanosis, or edema  NEURO: AAOx3, non-focal   SKIN: No rashes     Vital Signs Last 24 Hrs  T(C): 36.9 (27 Aug 2023 06:35), Max: 36.9 (26 Aug 2023 15:03)  T(F): 98.5 (27 Aug 2023 06:35), Max: 98.5 (26 Aug 2023 15:03)  HR: 78 (27 Aug 2023 06:35) (78 - 86)  BP: 96/58 (27 Aug 2023 06:35) (96/58 - 135/69)  BP(mean): --  RR: 18 (27 Aug 2023 06:35) (18 - 20)  SpO2: 99% (27 Aug 2023 06:35) (99% - 100%)    Parameters below as of 27 Aug 2023 06:35  Patient On (Oxygen Delivery Method): room air        Consultant(s) Notes Reviewed:  [x] YES  [ ] NO  Care Discussed with Consultants/Other Providers [x] YES  [ ] NO    LABS:                        7.7    13.95 )-----------( 188      ( 26 Aug 2023 07:19 )             25.7                         6.9    12.96 )-----------( 216      ( 25 Aug 2023 11:38 )             24.0                         7.4    11.97 )-----------( 217      ( 24 Aug 2023 12:04 )             25.9         Urinalysis Basic - ( 26 Aug 2023 07:20 )    Color: x / Appearance: x / SG: x / pH: x  Gluc: 132 mg/dL / Ketone: x  / Bili: x / Urobili: x   Blood: x / Protein: x / Nitrite: x   Leuk Esterase: x / RBC: x / WBC x   Sq Epi: x / Non Sq Epi: x / Bacteria: x        RADIOLOGY & ADDITIONAL TESTS:    Imaging Personally Reviewed:  [x] YES  [ ] NO    MEDICATIONS  (STANDING):  albuterol/ipratropium for Nebulization 3 milliLiter(s) Nebulizer every 6 hours  apixaban 5 milliGRAM(s) Oral every 12 hours  atorvastatin 20 milliGRAM(s) Oral at bedtime  buDESOnide    Inhalation Suspension 0.5 milliGRAM(s) Inhalation two times a day  chlorhexidine 4% Liquid 1 Application(s) Topical daily  dextrose 5%. 1000 milliLiter(s) (100 mL/Hr) IV Continuous <Continuous>  dextrose 5%. 1000 milliLiter(s) (50 mL/Hr) IV Continuous <Continuous>  dextrose 50% Injectable 25 Gram(s) IV Push once  dextrose 50% Injectable 12.5 Gram(s) IV Push once  dextrose 50% Injectable 25 Gram(s) IV Push once  folic acid 1 milliGRAM(s) Oral daily  glucagon  Injectable 1 milliGRAM(s) IntraMuscular once  insulin glargine Injectable (LANTUS) 28 Unit(s) SubCutaneous at bedtime  insulin lispro (ADMELOG) corrective regimen sliding scale   SubCutaneous <User Schedule>  insulin lispro (ADMELOG) corrective regimen sliding scale   SubCutaneous three times a day before meals  insulin lispro Injectable (ADMELOG) 14 Unit(s) SubCutaneous before dinner  insulin lispro Injectable (ADMELOG) 14 Unit(s) SubCutaneous before breakfast  insulin lispro Injectable (ADMELOG) 16 Unit(s) SubCutaneous before lunch  methylPREDNISolone   Oral   methylPREDNISolone 24 milliGRAM(s) Oral daily  mexiletine 200 milliGRAM(s) Oral every 8 hours  nystatin    Suspension [id number] Unit(s) Swish and Swallow two times a day  pantoprazole    Tablet 40 milliGRAM(s) Oral before breakfast  polyethylene glycol 3350 17 Gram(s) Oral two times a day  senna 2 Tablet(s) Oral at bedtime  sodium chloride 7% Inhalation 4 milliLiter(s) Inhalation every 6 hours  tiotropium 2.5 MICROgram(s) Inhaler 2 Puff(s) Inhalation daily  trimethoprim  160 mG/sulfamethoxazole 800 mG 1 Tablet(s) Oral daily    MEDICATIONS  (PRN):  acetaminophen     Tablet .. 650 milliGRAM(s) Oral every 6 hours PRN Temp greater or equal to 38C (100.4F), Mild Pain (1 - 3)  aluminum hydroxide/magnesium hydroxide/simethicone Suspension 30 milliLiter(s) Oral every 4 hours PRN Dyspepsia  dextrose Oral Gel 15 Gram(s) Oral once PRN Blood Glucose LESS THAN 70 milliGRAM(s)/deciliter  diphenhydrAMINE 25 milliGRAM(s) Oral daily PRN Allergy symptoms  melatonin 3 milliGRAM(s) Oral at bedtime PRN Insomnia  ondansetron Injectable 4 milliGRAM(s) IV Push every 8 hours PRN Nausea and/or Vomiting      HEALTH ISSUES - PROBLEM Dx:  Atrial fibrillation  paroxysmal, on eliquis    Diabetes mellitus    Prophylactic measure    Acute asthma exacerbation    SIRS without infection or organ dysfunction    Acute exacerbation of bronchiectasis    Uncontrolled type 2 diabetes mellitus with hyperglycemia    Essential hypertension    Hyperlipidemia    H/O adrenal insufficiency    Anemia    Aortic stenosis

## 2023-08-28 NOTE — CONSULT NOTE ADULT - CONSULT REQUESTED DATE/TIME
11-Aug-2023 18:19
12-Aug-2023 18:23
25-Aug-2023 13:54
14-Aug-2023 15:03
17-Aug-2023 08:56
25-Aug-2023
28-Aug-2023 12:22

## 2023-08-28 NOTE — PROGRESS NOTE ADULT - ASSESSMENT
73 yo PMHx  asthma on chronic steroids, bronchiectasis s/p surgery, allergic rhinitis,  recurrent PNA, PND, tracheomalacia s/p tracheoplasty, ESBL proteus infections (sputum),  diabetes, paroxysmal A-fib on Eliquis, colorectal cancer many years ago status post colostomy presenting with concern for shortness of breath. Prior issues with ESBL/Proteus/yeast in sputum culture and was treated with ertapenem/Benadryl/vancomycin/aztreonam and methylprednisolone.   She was discharged home with a midline and completed a course of antibiotics ertapenem (last dose 06/25) PT now readmitted with severe dyspnea and reports that having had asthma for 61 years she feels like she needs an antibiotic. Reports not tolerating meropenem. Patient follows with Dr Allison who knows this complicated pt very well  Noted  pt has PCN, cefepime allergies    RECOMMENDATIONS  -no evidence of airspace disease so suspect likely Bronchiectasis exacerbation  -recent sputums with ESBL proteus so having tolerated ertapenem and was restarted   -Repeat CXR with no pneumonia   -Fungitell negative     -8/15 Sputum culture with normal resp val   -8/14 Sputum AFB smear negative -f/u culture to rule out DIEUDONNE  -8/17 CF sputum culture with mod Staph aureus-MRSA, Pantoea (previously Enterobacter) and normal resp val   - sensitivities noted; recently completed course of ertapenem 8/12-8/18, now on linezolid     Anemia-s/p PRBC transition 8/25 pm  Leukocytosis- suspect likely reactive in setting of steroids    - WBC trended upward today, remains afebrile, has some dyspnea attributing to anemia, feels her cough has improved   - repeat Scx on 8/26 with normal resp val - no MRSA or Pantoea or Proteus isolated   CTS following for bioprosthetic AV withd stenosis     Pulmonary following - changed to po steroid 8/19 with taper  Follow AFB, fungal sputum cultures (NGTD)  S/p linezolid day (8/18-8/24)  S/p ertapenem (8/12-8/18)  Continue off antibiotics other than bactrim for PJP ppx  Continue to trend temps/WBC  Supportive care, chest PT, neb treatments       Tello Fernandez M.D.  [facility], Division of Infectious Diseases  [phone]  After 5pm on weekdays and all day on weekends - please call [phone]     None

## 2023-08-28 NOTE — PROGRESS NOTE ADULT - SUBJECTIVE AND OBJECTIVE BOX
[facility] INFECTIOUS DISEASES  GISSELL Uriostegui Y. Patel, S. Shah, G. Casimir  [phone]  ([phone] - weekdays after 5pm and weekends)    Name: RAYRAY RODRIGUEZ  Age/Gender: 72y Female  MRN: [id number]    Interval History:  Patient seen and examined this morning.   States cough is better, no fevers or pain.   Feels some SOB, feels its due to her anemia  Notes reviewed.   No concerning overnight events.  Afebrile.     Allergies: tetanus toxoid (Short breath)  cefepime (Anaphylaxis)  penicillin (Anaphylaxis)  Avelox (Short breath; Pruritus)  Dilaudid (Short breath)  codeine (Short breath)  aspirin (Short breath)  iodine (Short breath; Swelling)  Valium (Short breath)  shellfish (Anaphylaxis)      Objective:  Vitals:   T(F): 98 (08-28-23 @ 06:45), Max: 98.8 (08-27-23 @ 21:37)  HR: 84 (08-28-23 @ 06:45) (80 - 84)  BP: 132/72 (08-28-23 @ 06:45) (104/68 - 132/72)  RR: 18 (08-28-23 @ 06:45) (18 - 20)  SpO2: 100% (08-28-23 @ 06:45) (98% - 100%)  Physical Examination:  General: no acute distress, standing, on RA  HEENT: NC/AT, EOMI, anicteric, neck supple  Cardio: S1, S2 present, normal rate  Resp: decreased breath sounds b/l  Abd: soft, NT, ND, + BS  Neuro: AAOx3, no obvious focal deficits  Ext: no edema, no cyanosis, moving extremities  Skin: warm, dry, no visible rash  Psych: appropriate affect and mood for situation  Lines: PIV    Laboratory Studies:  CBC:                       8.4    21.03 )-----------( 188      ( 28 Aug 2023 10:44 )             28.4     WBC Trend:  21.03 08-28-23 @ 10:44  15.97 08-27-23 @ 11:23  13.95 08-26-23 @ 07:19  12.96 08-25-23 @ 11:38  11.97 08-24-23 @ 12:04  13.41 08-23-23 @ 07:16  11.69 08-22-23 @ 06:39  12.71 08-21-23 @ 16:38    CMP: 08-28    141  |  104  |  16  ----------------------------<  133<H>  4.4   |  25  |  0.79    Ca    8.9      28 Aug 2023 10:44  Phos  3.3     08-28  Mg     2.0     08-28    TPro  5.7<L>  /  Alb  3.6  /  TBili  0.7  /  DBili  x   /  AST  58<H>  /  ALT  28  /  AlkPhos  69  08-28    Creatinine: 0.79 mg/dL (08-28-23 @ 10:44)  Creatinine: 0.65 mg/dL (08-27-23 @ 11:23)  Creatinine: 0.67 mg/dL (08-26-23 @ 07:20)  Creatinine: 0.64 mg/dL (08-25-23 @ 11:38)  Creatinine: 0.73 mg/dL (08-24-23 @ 12:04)  Creatinine: 0.71 mg/dL (08-23-23 @ 07:12)  Creatinine: 0.76 mg/dL (08-22-23 @ 06:38)    LIVER FUNCTIONS - ( 28 Aug 2023 10:44 )  Alb: 3.6 g/dL / Pro: 5.7 g/dL / ALK PHOS: 69 U/L / ALT: 28 U/L / AST: 58 U/L / GGT: x           Microbiology: reviewed   Culture - Sputum (collected 08-26-23 @ 17:57)  Source: .Sputum Sputum  Gram Stain (08-27-23 @ 06:39):    Moderate polymorphonuclear leukocytes per low power field    No Squamous epithelial cells per low power field    Moderate Gram positive cocci in pairs seen per oil power field  Preliminary Report (08-27-23 @ 18:47):    Normal Respiratory Jessica present    Culture - Sputum, Cystic Fibrosis (collected 08-18-23 @ 23:27)  Source: .Sputum Sputum  Gram Stain (08-19-23 @ 03:39):    No polymorphonuclear leukocytes per low power field    No Squamous epithelial cells per low power field    No organisms seen per oil power field  Final Report (08-22-23 @ 15:32):    Rare Methicillin Resistant Staphylococcus aureus    Rare Normal Respiratory Jessica present  Organism: Methicillin resistant Staphylococcus aureus (08-22-23 @ 15:32)  Organism: Methicillin resistant Staphylococcus aureus (08-22-23 @ 15:32)      Method Type: GARRETT      -  Ampicillin/Sulbactam: R <=8/4      -  Cefazolin: R >16      -  Clindamycin: S <=0.25      -  Erythromycin: R >4      -  Gentamicin: S <=1 Should not be used as monotherapy      -  Linezolid: S 2      -  Oxacillin: R >2      -  Penicillin: R >8      -  Rifampin: S <=1 Should not be used as monotherapy      -  Tetracycline: S <=1      -  Trimethoprim/Sulfamethoxazole: R >2/38      -  Vancomycin: S 2    Culture - Sputum (collected 08-18-23 @ 10:43)  Source: .Sputum Sputum  Gram Stain (08-18-23 @ 20:40):    No polymorphonuclear leukocytes per low power field    Rare Squamous epithelial cells per low power field    Moderate Gram positive cocci in pairs per oil power field    Few Gram Negative Rods per oil power field  Final Report (08-20-23 @ 19:06):    Normal Respiratory Jessica present    Culture - Blood (collected 08-17-23 @ 09:35)  Source: .Blood Blood-Peripheral  Final Report (08-22-23 @ 18:00):    No growth at 5 days    Culture - Blood (collected 08-17-23 @ 09:30)  Source: .Blood Blood-Peripheral  Final Report (08-22-23 @ 18:00):    No growth at 5 days    Culture - Sputum, Cystic Fibrosis (collected 08-17-23 @ 01:18)  Source: .Sputum Sputum  Gram Stain (08-17-23 @ 10:15):    Rare Squamous epithelial cells per low power field    No polymorphonuclear leukocytes per low power field    Rare Gram positive cocci in pairs per oil power field  Final Report (08-21-23 @ 09:41):    Moderate Methicillin Resistant Staphylococcus aureus    Rare Pantoea agglomerans    Numerous Normal Respiratory Jessica present  Organism: Methicillin resistant Staphylococcus aureus  Pantoea agglomerans (Previousley Enterobacter) (08-21-23 @ 09:41)  Organism: Pantoea agglomerans (Previousley Enterobacter) (08-21-23 @ 09:41)      Method Type: GARRETT      -  Amikacin: S <=16      -  Amoxicillin/Clavulanic Acid: S <=8/4      -  Ampicillin: S <=8 These ampicillin results predict results for amoxicillin      -  Ampicillin/Sulbactam: S <=4/2 Enterobacter, Klebsiella aerogenes, Citrobacter, and Serratia may develop resistance during prolonged therapy (3-4 days)      -  Aztreonam: S <=4      -  Cefazolin: S <=2 Enterobacter, Klebsiella aerogenes, Citrobacter, and Serratia may develop resistance during prolonged therapy (3-4 days)      -  Cefepime: S <=2      -  Cefoxitin: S <=8      -  Ceftriaxone: S <=1 Enterobacter, Klebsiella aerogenes, Citrobacter, and Serratia may develop resistance during prolonged therapy      -  Ciprofloxacin: S <=0.25      -  Ertapenem: S <=0.5      -  Gentamicin: S <=2      -  Levofloxacin: S <=0.5      -  Meropenem: S <=1      -  Piperacillin/Tazobactam: S <=8      -  Tobramycin: S <=2      -  Trimethoprim/Sulfamethoxazole: S <=0.5/9.5  Organism: Methicillin resistant Staphylococcus aureus (08-21-23 @ 09:41)      Method Type: GARRETT      -  Ampicillin/Sulbactam: R <=8/4      -  Cefazolin: R >16      -  Clindamycin: S <=0.25      -  Erythromycin: R >4      -  Gentamicin: S <=1 Should not be used as monotherapy      -  Linezolid: S 2      -  Oxacillin: R >2      -  Penicillin: R >8      -  Rifampin: S <=1 Should not be used as monotherapy      -  Tetracycline: S 2      -  Trimethoprim/Sulfamethoxazole: R >2/38      -  Vancomycin: S 2    Culture - Sputum (collected 08-15-23 @ 12:49)  Source: .Sputum Sputum  Gram Stain (08-15-23 @ 21:07):    Rare Squamous epithelial cells per low power field    Rare polymorphonuclear leukocytes per low power field    Rare Yeast like cells per oil power field  Final Report (08-17-23 @ 18:08):    Normal Respiratory Jessica present    SARS-CoV-2 Result: NotDetec (11 Aug 2023 16:06)    Radiology: reviewed     Medications:  acetaminophen     Tablet .. 650 milliGRAM(s) Oral every 6 hours PRN  albuterol/ipratropium for Nebulization 3 milliLiter(s) Nebulizer every 6 hours  aluminum hydroxide/magnesium hydroxide/simethicone Suspension 30 milliLiter(s) Oral every 4 hours PRN  apixaban 5 milliGRAM(s) Oral every 12 hours  atorvastatin 20 milliGRAM(s) Oral at bedtime  buDESOnide    Inhalation Suspension 0.5 milliGRAM(s) Inhalation two times a day  chlorhexidine 4% Liquid 1 Application(s) Topical daily  dextrose 5%. 1000 milliLiter(s) IV Continuous <Continuous>  dextrose 5%. 1000 milliLiter(s) IV Continuous <Continuous>  dextrose 50% Injectable 25 Gram(s) IV Push once  dextrose 50% Injectable 12.5 Gram(s) IV Push once  dextrose 50% Injectable 25 Gram(s) IV Push once  dextrose Oral Gel 15 Gram(s) Oral once PRN  diphenhydrAMINE 25 milliGRAM(s) Oral daily PRN  folic acid 1 milliGRAM(s) Oral daily  glucagon  Injectable 1 milliGRAM(s) IntraMuscular once  insulin glargine Injectable (LANTUS) 30 Unit(s) SubCutaneous at bedtime  insulin lispro (ADMELOG) corrective regimen sliding scale   SubCutaneous <User Schedule>  insulin lispro (ADMELOG) corrective regimen sliding scale   SubCutaneous three times a day before meals  insulin lispro Injectable (ADMELOG) 16 Unit(s) SubCutaneous before breakfast  insulin lispro Injectable (ADMELOG) 16 Unit(s) SubCutaneous before dinner  insulin lispro Injectable (ADMELOG) 16 Unit(s) SubCutaneous before lunch  melatonin 3 milliGRAM(s) Oral at bedtime PRN  methylPREDNISolone   Oral   methylPREDNISolone 24 milliGRAM(s) Oral daily  mexiletine 200 milliGRAM(s) Oral every 8 hours  nystatin    Suspension 627118 Unit(s) Swish and Swallow two times a day  ondansetron Injectable 4 milliGRAM(s) IV Push every 8 hours PRN  pantoprazole    Tablet 40 milliGRAM(s) Oral before breakfast  polyethylene glycol 3350 17 Gram(s) Oral two times a day  senna 2 Tablet(s) Oral at bedtime  sodium chloride 7% Inhalation 4 milliLiter(s) Inhalation every 6 hours  tiotropium 2.5 MICROgram(s) Inhaler 2 Puff(s) Inhalation daily  trimethoprim  160 mG/sulfamethoxazole 800 mG 1 Tablet(s) Oral daily    Current Antimicrobials:  nystatin    Suspension 487565 Unit(s) Swish and Swallow two times a day  trimethoprim  160 mG/sulfamethoxazole 800 mG 1 Tablet(s) Oral daily    Prior/Completed Antimicrobials:  ertapenem  IVPB  ertapenem  IVPB  linezolid    Tablet

## 2023-08-29 LAB
ALBUMIN SERPL ELPH-MCNC: 3.6 G/DL — SIGNIFICANT CHANGE UP (ref 3.3–5)
ALP SERPL-CCNC: 64 U/L — SIGNIFICANT CHANGE UP (ref 40–120)
ALT FLD-CCNC: 30 U/L — SIGNIFICANT CHANGE UP (ref 10–45)
ANION GAP SERPL CALC-SCNC: 15 MMOL/L — SIGNIFICANT CHANGE UP (ref 5–17)
APTT BLD: 26 SEC — SIGNIFICANT CHANGE UP (ref 24.5–35.6)
AST SERPL-CCNC: 73 U/L — HIGH (ref 10–40)
BILIRUB SERPL-MCNC: 0.9 MG/DL — SIGNIFICANT CHANGE UP (ref 0.2–1.2)
BLD GP AB SCN SERPL QL: NEGATIVE — SIGNIFICANT CHANGE UP
BUN SERPL-MCNC: 20 MG/DL — SIGNIFICANT CHANGE UP (ref 7–23)
CALCIUM SERPL-MCNC: 9.2 MG/DL — SIGNIFICANT CHANGE UP (ref 8.4–10.5)
CHLORIDE SERPL-SCNC: 104 MMOL/L — SIGNIFICANT CHANGE UP (ref 96–108)
CO2 SERPL-SCNC: 25 MMOL/L — SIGNIFICANT CHANGE UP (ref 22–31)
CREAT SERPL-MCNC: 0.81 MG/DL — SIGNIFICANT CHANGE UP (ref 0.5–1.3)
EGFR: 76 ML/MIN/1.73M2 — SIGNIFICANT CHANGE UP
FIBRINOGEN PPP-MCNC: 326 MG/DL — SIGNIFICANT CHANGE UP (ref 200–445)
GLUCOSE BLDC GLUCOMTR-MCNC: 110 MG/DL — HIGH (ref 70–99)
GLUCOSE BLDC GLUCOMTR-MCNC: 118 MG/DL — HIGH (ref 70–99)
GLUCOSE BLDC GLUCOMTR-MCNC: 122 MG/DL — HIGH (ref 70–99)
GLUCOSE BLDC GLUCOMTR-MCNC: 126 MG/DL — HIGH (ref 70–99)
GLUCOSE BLDC GLUCOMTR-MCNC: 190 MG/DL — HIGH (ref 70–99)
GLUCOSE SERPL-MCNC: 138 MG/DL — HIGH (ref 70–99)
HAPTOGLOB SERPL-MCNC: <20 MG/DL — LOW (ref 34–200)
HCT VFR BLD CALC: 25.8 % — LOW (ref 34.5–45)
HGB BLD-MCNC: 7.5 G/DL — LOW (ref 11.5–15.5)
INR BLD: 1 RATIO — SIGNIFICANT CHANGE UP (ref 0.85–1.18)
INTERPRETATION SERPL IFE-IMP: SIGNIFICANT CHANGE UP
LDH SERPL L TO P-CCNC: 1896 U/L — HIGH (ref 50–242)
MAGNESIUM SERPL-MCNC: 2 MG/DL — SIGNIFICANT CHANGE UP (ref 1.6–2.6)
MCHC RBC-ENTMCNC: 22.7 PG — LOW (ref 27–34)
MCHC RBC-ENTMCNC: 29.1 GM/DL — LOW (ref 32–36)
MCV RBC AUTO: 78.2 FL — LOW (ref 80–100)
NRBC # BLD: 62 /100 WBCS — HIGH (ref 0–0)
PHOSPHATE SERPL-MCNC: 4.1 MG/DL — SIGNIFICANT CHANGE UP (ref 2.5–4.5)
PLATELET # BLD AUTO: 194 K/UL — SIGNIFICANT CHANGE UP (ref 150–400)
POTASSIUM SERPL-MCNC: 4.3 MMOL/L — SIGNIFICANT CHANGE UP (ref 3.5–5.3)
POTASSIUM SERPL-SCNC: 4.3 MMOL/L — SIGNIFICANT CHANGE UP (ref 3.5–5.3)
PROCALCITONIN SERPL-MCNC: 0.34 NG/ML — HIGH (ref 0.02–0.1)
PROT SERPL-MCNC: 5.6 G/DL — LOW (ref 6–8.3)
PROTHROM AB SERPL-ACNC: 11 SEC — SIGNIFICANT CHANGE UP (ref 9.5–13)
RBC # BLD: 3.3 M/UL — LOW (ref 3.8–5.2)
RBC # FLD: 35.6 % — HIGH (ref 10.3–14.5)
RH IG SCN BLD-IMP: POSITIVE — SIGNIFICANT CHANGE UP
SODIUM SERPL-SCNC: 144 MMOL/L — SIGNIFICANT CHANGE UP (ref 135–145)
WBC # BLD: 19.19 K/UL — HIGH (ref 3.8–10.5)
WBC # FLD AUTO: 19.19 K/UL — HIGH (ref 3.8–10.5)

## 2023-08-29 PROCEDURE — 99232 SBSQ HOSP IP/OBS MODERATE 35: CPT

## 2023-08-29 PROCEDURE — 99233 SBSQ HOSP IP/OBS HIGH 50: CPT | Mod: GC

## 2023-08-29 PROCEDURE — 93880 EXTRACRANIAL BILAT STUDY: CPT | Mod: 26

## 2023-08-29 RX ORDER — INSULIN LISPRO 100/ML
16 VIAL (ML) SUBCUTANEOUS
Refills: 0 | Status: DISCONTINUED | OUTPATIENT
Start: 2023-08-30 | End: 2023-09-06

## 2023-08-29 RX ORDER — INSULIN LISPRO 100/ML
18 VIAL (ML) SUBCUTANEOUS
Refills: 0 | Status: DISCONTINUED | OUTPATIENT
Start: 2023-08-29 | End: 2023-09-06

## 2023-08-29 RX ORDER — ACETAMINOPHEN 500 MG
1000 TABLET ORAL ONCE
Refills: 0 | Status: DISCONTINUED | OUTPATIENT
Start: 2023-08-29 | End: 2023-08-30

## 2023-08-29 RX ORDER — INSULIN LISPRO 100/ML
12 VIAL (ML) SUBCUTANEOUS
Refills: 0 | Status: DISCONTINUED | OUTPATIENT
Start: 2023-08-30 | End: 2023-08-31

## 2023-08-29 RX ORDER — INSULIN GLARGINE 100 [IU]/ML
25 INJECTION, SOLUTION SUBCUTANEOUS AT BEDTIME
Refills: 0 | Status: DISCONTINUED | OUTPATIENT
Start: 2023-08-29 | End: 2023-08-30

## 2023-08-29 RX ORDER — INSULIN LISPRO 100/ML
18 VIAL (ML) SUBCUTANEOUS
Refills: 0 | Status: DISCONTINUED | OUTPATIENT
Start: 2023-08-29 | End: 2023-08-29

## 2023-08-29 RX ADMIN — APIXABAN 5 MILLIGRAM(S): 2.5 TABLET, FILM COATED ORAL at 18:27

## 2023-08-29 RX ADMIN — Medication 1 APPLICATION(S): at 06:43

## 2023-08-29 RX ADMIN — SODIUM CHLORIDE 4 MILLILITER(S): 9 INJECTION INTRAMUSCULAR; INTRAVENOUS; SUBCUTANEOUS at 18:27

## 2023-08-29 RX ADMIN — MEXILETINE HYDROCHLORIDE 200 MILLIGRAM(S): 150 CAPSULE ORAL at 18:26

## 2023-08-29 RX ADMIN — SENNA PLUS 2 TABLET(S): 8.6 TABLET ORAL at 21:21

## 2023-08-29 RX ADMIN — INSULIN GLARGINE 25 UNIT(S): 100 INJECTION, SOLUTION SUBCUTANEOUS at 21:22

## 2023-08-29 RX ADMIN — Medication 1 APPLICATION(S): at 18:27

## 2023-08-29 RX ADMIN — Medication 1 TABLET(S): at 18:26

## 2023-08-29 RX ADMIN — Medication 500000 UNIT(S): at 18:26

## 2023-08-29 RX ADMIN — Medication 24 MILLIGRAM(S): at 18:25

## 2023-08-29 RX ADMIN — Medication 0.5 MILLIGRAM(S): at 18:26

## 2023-08-29 RX ADMIN — Medication 18 UNIT(S): at 18:25

## 2023-08-29 RX ADMIN — ATORVASTATIN CALCIUM 20 MILLIGRAM(S): 80 TABLET, FILM COATED ORAL at 21:21

## 2023-08-29 RX ADMIN — Medication 3 MILLILITER(S): at 18:26

## 2023-08-29 RX ADMIN — ONDANSETRON 4 MILLIGRAM(S): 8 TABLET, FILM COATED ORAL at 16:29

## 2023-08-29 RX ADMIN — POLYETHYLENE GLYCOL 3350 17 GRAM(S): 17 POWDER, FOR SOLUTION ORAL at 18:26

## 2023-08-29 RX ADMIN — MEXILETINE HYDROCHLORIDE 200 MILLIGRAM(S): 150 CAPSULE ORAL at 21:22

## 2023-08-29 NOTE — PROGRESS NOTE ADULT - SUBJECTIVE AND OBJECTIVE BOX
Internal Medicine   Valente Mortensen| PGY-1    OVERNIGHT EVENTS: Lantus Reduced for NPO, Pt was unaware of SAFIA, was updated as to how procedure works      SUBJECTIVE: Pt says she slept well and is ready for procedure today      MEDICATIONS  (STANDING):  acetaminophen   IVPB .. 1000 milliGRAM(s) IV Intermittent once  albuterol/ipratropium for Nebulization 3 milliLiter(s) Nebulizer every 6 hours  apixaban 5 milliGRAM(s) Oral every 12 hours  atorvastatin 20 milliGRAM(s) Oral at bedtime  buDESOnide    Inhalation Suspension 0.5 milliGRAM(s) Inhalation two times a day  chlorhexidine 4% Liquid 1 Application(s) Topical daily  clotrimazole 1% Cream 1 Application(s) Topical two times a day  dextrose 5%. 1000 milliLiter(s) (50 mL/Hr) IV Continuous <Continuous>  dextrose 5%. 1000 milliLiter(s) (100 mL/Hr) IV Continuous <Continuous>  dextrose 50% Injectable 25 Gram(s) IV Push once  dextrose 50% Injectable 12.5 Gram(s) IV Push once  dextrose 50% Injectable 25 Gram(s) IV Push once  folic acid 1 milliGRAM(s) Oral daily  glucagon  Injectable 1 milliGRAM(s) IntraMuscular once  insulin glargine Injectable (LANTUS) 25 Unit(s) SubCutaneous at bedtime  insulin lispro (ADMELOG) corrective regimen sliding scale   SubCutaneous <User Schedule>  insulin lispro (ADMELOG) corrective regimen sliding scale   SubCutaneous three times a day before meals  insulin lispro Injectable (ADMELOG) 18 Unit(s) SubCutaneous before dinner  methylPREDNISolone   Oral   mexiletine 200 milliGRAM(s) Oral every 8 hours  nystatin    Suspension 964642 Unit(s) Swish and Swallow two times a day  pantoprazole    Tablet 40 milliGRAM(s) Oral before breakfast  polyethylene glycol 3350 17 Gram(s) Oral two times a day  senna 2 Tablet(s) Oral at bedtime  sodium chloride 7% Inhalation 4 milliLiter(s) Inhalation every 6 hours  tiotropium 2.5 MICROgram(s) Inhaler 2 Puff(s) Inhalation daily  trimethoprim  160 mG/sulfamethoxazole 800 mG 1 Tablet(s) Oral daily    MEDICATIONS  (PRN):  acetaminophen     Tablet .. 650 milliGRAM(s) Oral every 6 hours PRN Temp greater or equal to 38C (100.4F), Mild Pain (1 - 3)  aluminum hydroxide/magnesium hydroxide/simethicone Suspension 30 milliLiter(s) Oral every 4 hours PRN Dyspepsia  dextrose Oral Gel 15 Gram(s) Oral once PRN Blood Glucose LESS THAN 70 milliGRAM(s)/deciliter  diphenhydrAMINE 25 milliGRAM(s) Oral daily PRN Allergy symptoms  melatonin 3 milliGRAM(s) Oral at bedtime PRN Insomnia  ondansetron Injectable 4 milliGRAM(s) IV Push every 8 hours PRN Nausea and/or Vomiting        T(F): 98.8 (08-29-23 @ 13:40), Max: 98.8 (08-29-23 @ 13:40)  HR: 78 (08-29-23 @ 13:40) (78 - 90)  BP: 99/66 (08-29-23 @ 13:40) (99/66 - 120/73)  BP(mean): --  RR: 18 (08-29-23 @ 13:40) (18 - 18)  SpO2: 98% (08-29-23 @ 13:40) (98% - 100%)    PHYSICAL EXAM:     GENERAL: NAD, lying in bed comfortably  EYES: Conjunctiva and sclera clear, no nystagmus noted, L eye slight proptosis, likely chronic, bilateral arcus senilis   CHEST/LUNG: Clear to auscultation bilaterally; No rales, rhonchi, wheezing, or rubs. Unlabored respirations  HEART: Regular rate and rhythm; No slight 2/6 murmur on exam, rubs, or gallops, normal S1/S2  ABDOMEN: normal bowel sounds; Soft, nontender, nondistended,  EXTREMITIES:  2+ Peripheral Pulses. No clubbing, cyanosis, or edema  MSK: No gross deformities noted   Neurological:  A&Ox3, no focal deficits   SKIN: No rashes or lesions  PSYCH: Mood: "prepared' , affect: congruent/appropriate    TELEMETRY:    LABS:                        7.5    19.19 )-----------( 194      ( 29 Aug 2023 07:38 )             25.8     08-29    144  |  104  |  20  ----------------------------<  138<H>  4.3   |  25  |  0.81    Ca    9.2      29 Aug 2023 07:34  Phos  4.1     08-29  Mg     2.0     08-29    TPro  5.6<L>  /  Alb  3.6  /  TBili  0.9  /  DBili  x   /  AST  73<H>  /  ALT  30  /  AlkPhos  64  08-29        PT/INR - ( 29 Aug 2023 07:36 )   PT: 11.0 sec;   INR: 1.00 ratio         PTT - ( 29 Aug 2023 07:36 )  PTT:26.0 sec    Creatinine Trend: 0.81<--, 0.79<--, 0.65<--, 0.67<--, 0.64<--, 0.73<--  I&O's Summary    28 Aug 2023 07:01  -  29 Aug 2023 07:00  --------------------------------------------------------  IN: 540 mL / OUT: 0 mL / NET: 540 mL      BNP    RADIOLOGY & ADDITIONAL STUDIES:

## 2023-08-29 NOTE — PROGRESS NOTE ADULT - ATTENDING COMMENTS
72F  with history of bronchiectasis on chronic steroids,   s/p surgery, allergic rhinitis,  recurrent PNA, PND, tracheomalacia s/p tracheoplasty, ESBL proteus infections (sputum), T2DM, paroxysmal A-fib on Eliquis, colorectal cancer many years ago status post colostomy presenting with shortness of breath of 3 day duration. Prior issues with ESBL/Proteus/yeast in sputum culture and was treated with ertapenem/Benadryl/vancomycin/aztreonam and methylprednisolone. She was discharged home with a midline and completed a course of antibiotics ertapenem (last dose 06/25). She was also recently  treated for MRSA and pseudomonas in sputum and completed Tobra nebs and doxycycline.    1. Acute on chronic hypoxic RF due to exacerbation of bronchiectasis (on chronic steroid)  2. Recent tracheo-bronchitis due to ESBL proteus/MRSA/pseudomonas  3. Anemia, likely hemolysis and iron deficiency  4. Paroxysmal A-fib on Eliquis  5. H/o colorectal cancer, status post colostomy    Pt with elevated WBC today but respiratory exam stable  Seen by ID with no further recommendations  Still with ongoing anemia- workup with likely hemolysis, darby negative- Heme consult called  Evaluation of AVR with SAFIA put on hold secondary to anemia

## 2023-08-29 NOTE — PROGRESS NOTE ADULT - SUBJECTIVE AND OBJECTIVE BOX
DIABETES FOLLOW UP NOTE: Saw pt earlier today    Chief Complaint: Endocrine consult requested for management of T2DM    INTERVAL HX: Pt stable, reports tolerating POs with BG levels variable between 100s to 200s in last 24 hours. Noted BG in 100s in am and 200s in pm. No hypoglycemia.  Pt NPO today for SAFIA> received 50% of basal isnulin with BG at goal today. Remains on Methylprednisolone 24mg/day x 7 days tapering down back to base line dose of 8mg daily for AI.  Dose coming down tomorrow to 16mg x 7 days.      Review of Systems:  General: As above  Cardiovascular: No chest pain, palpitations  Respiratory: No SOB, no cough  GI: No nausea, vomiting, abdominal pain  Endocrine: No polyuria, polydipsia or S&Sx of hypoglycemia    Allergies    tetanus toxoid (Short breath)  cefepime (Anaphylaxis)  penicillin (Anaphylaxis)  Avelox (Short breath; Pruritus)  Dilaudid (Short breath)  codeine (Short breath)  aspirin (Short breath)  iodine (Short breath; Swelling)  Valium (Short breath)  shellfish (Anaphylaxis)    Intolerances      MEDICATIONS:  atorvastatin 20 milliGRAM(s) Oral at bedtime  insulin glargine Injectable (LANTUS) 15 Unit(s) SubCutaneous at bedtime  insulin lispro (ADMELOG) corrective regimen sliding scale   SubCutaneous three times a day before meals  insulin lispro (ADMELOG) corrective regimen sliding scale   SubCutaneous <User Schedule>  insulin lispro Injectable (ADMELOG) 16 Unit(s) SubCutaneous before breakfast  insulin lispro Injectable (ADMELOG) 18 Unit(s) SubCutaneous before lunch  insulin lispro Injectable (ADMELOG) 18 Unit(s) SubCutaneous before dinner  methylPREDNISolone 24 milliGRAM(s) Oral daily  trimethoprim  160 mG/sulfamethoxazole 800 mG 1 Tablet(s) Oral daily      PHYSICAL EXAM:  VITALS: T(C): 37.1 (08-29-23 @ 13:40)  T(F): 98.8 (08-29-23 @ 13:40), Max: 98.8 (08-29-23 @ 13:40)  HR: 78 (08-29-23 @ 13:40) (78 - 90)  BP: 99/66 (08-29-23 @ 13:40) (99/66 - 120/73)  RR:  (18 - 18)  SpO2:  (98% - 100%)  Wt(kg): --  GENERAL: Female laying in bed in NAD  Abdomen: Soft, nontender, non distended  Extremities: Warm, no edema in all 4 exts  NEURO: Alert and able to answer questions    LABS:  POCT Blood Glucose.: 126 mg/dL (08-29-23 @ 13:39)  POCT Blood Glucose.: 122 mg/dL (08-29-23 @ 08:38)  POCT Blood Glucose.: 190 mg/dL (08-29-23 @ 01:46)  POCT Blood Glucose.: 242 mg/dL (08-28-23 @ 21:20)  POCT Blood Glucose.: 299 mg/dL (08-28-23 @ 17:27)  POCT Blood Glucose.: 112 mg/dL (08-28-23 @ 13:48)  POCT Blood Glucose.: 149 mg/dL (08-28-23 @ 09:31)  POCT Blood Glucose.: 181 mg/dL (08-28-23 @ 02:03)  POCT Blood Glucose.: 283 mg/dL (08-27-23 @ 21:22)  POCT Blood Glucose.: 322 mg/dL (08-27-23 @ 17:04)  POCT Blood Glucose.: 341 mg/dL (08-27-23 @ 12:50)  POCT Blood Glucose.: 193 mg/dL (08-27-23 @ 08:43)  POCT Blood Glucose.: 175 mg/dL (08-27-23 @ 02:03)  POCT Blood Glucose.: 237 mg/dL (08-26-23 @ 21:35)  POCT Blood Glucose.: 219 mg/dL (08-26-23 @ 17:29)                            7.5    19.19 )-----------( 194      ( 29 Aug 2023 07:38 )             25.8       08-29    144  |  104  |  20  ----------------------------<  138<H>  4.3   |  25  |  0.81    eGFR: 76    Ca    9.2      08-29  Mg     2.0     08-29  Phos  4.1     08-29    TPro  5.6<L>  /  Alb  3.6  /  TBili  0.9  /  DBili  x   /  AST  73<H>  /  ALT  30  /  AlkPhos  64  08-29      A1C with Estimated Average Glucose Result: 9.1 % (06-17-23 @ 10:27)      Estimated Average Glucose: 214 mg/dL (06-17-23 @ 10:27)

## 2023-08-29 NOTE — PROGRESS NOTE ADULT - ASSESSMENT
72 F w/h/o uncontrolled T2DM (A1C 9.4%) on basal/bolus insulin PTA. Unknown DM complications. Also COPD, secondary adrenal Insufficiency on chronic steroids, colorectal cancer s/p resection (colostomy bag), Chronic A fib on Eliquis, and tracheomalacia and multiple intubations, type A aortic dissection s/p aortic dissection repair on 9/07/22, sepsis. Pt well known to this provider from prior admissions. Here with SOB> treated for PNA and on Prednisolone 24mg going to 16mg tomorrow. Also found to have anemia and severe aortic stenosis> hematology and ct surgery following pt. NPO today for SAFIA.  Endocrine consulted for assistance with uncontrolled DM. BG Goal 100-180mg/dl. BGs above goal in pm but at goal in am so will decrease breakfast premeal insulin dose since steroid dose will come down tomorrow. BG goal 100 to 180s  Will start Freestyle Luis E 3 if pt going home. Needs keshia on phone but pt states she wants daughter to do it.

## 2023-08-29 NOTE — PROGRESS NOTE ADULT - PROBLEM SELECTOR PLAN 1
Test BG ac and hs and 2am while on steroids  Adjust Lantus dose to 25 units qhs for now  Adjust Admelog dose to 12-16-18 units qac starting tomorrow.  Hold if NPO or eating less than 50% of meals.  C/w Mod correction scale qac + bedtime+ check 2 am BG  Please contact endo team with any changes on steroid doses ( noted Methylprednisolone dose decrease to 16 mg on8/30 and down to 8mg on 9/6)  Discharge:  Will be determined according to BG/insulin needs/PO intake and steroid therapy at time of discharge.  Plan to d/c on basal bolus. Doses TBD  Outpt. endo follow-up. Dr Saavedra  Outpt. optho, podiatry, micro/cr  Make sure pt has Rx for all DM supplies and insulin/ DM meds. Consider FreeOscar Luis E 3 if going home

## 2023-08-29 NOTE — PROGRESS NOTE ADULT - ASSESSMENT
71 yo PMHx  asthma on chronic steroids, bronchiectasis s/p surgery, allergic rhinitis,  recurrent PNA, PND, tracheomalacia s/p tracheoplasty, ESBL proteus infections (sputum),  diabetes, paroxysmal A-fib on Eliquis, colorectal cancer many years ago status post colostomy presenting with concern for shortness of breath. Prior issues with ESBL/Proteus/yeast in sputum culture and was treated with ertapenem/Benadryl/vancomycin/aztreonam and methylprednisolone.   She was discharged home with a midline and completed a course of antibiotics ertapenem (last dose 06/25) PT now readmitted with severe dyspnea and reports that having had asthma for 61 years she feels like she needs an antibiotic. Reports not tolerating meropenem but has tolerated ertapenem. Patient follows with Dr Allison who knows this complicated pt very well. Noted pt has PCN, cefepime allergies    Suspect likely Bronchiectasis exacerbation  Leukocytosis- suspect likely reactive in setting of steroids   -Repeat CXR with no pneumonia  -Fungitell negative   -8/15 Scx with normal resp val   -8/14 Sputum AFB smear negative -f/u culture to rule out DIEUDONNE - NGTD  -8/17 CF Scx with mod MRSA, Pantoea (previously Enterobacter) and normal resp val  -8/26 repeat Scx with normal resp val   - WBC trended down, remains afebrile, feels cough has improved    S/p linezolid day (8/18-8/24)  S/p ertapenem (8/12-8/18)    Pulmonary following - changed to po steroid 8/19 with taper  Follow AFB, fungal sputum cultures (NGTD)  Continue off antibiotics other than bactrim for PJP ppx  CTS following for bioprosthetic AV with stenosis, planning for SAFIA  Continue to trend temps/WBC  Supportive care, chest PT, neb treatments       Tello Fernandez M.D.  [facility], Division of Infectious Diseases  [phone]  After 5pm on weekdays and all day on weekends - please call [phone]

## 2023-08-29 NOTE — PROGRESS NOTE ADULT - SUBJECTIVE AND OBJECTIVE BOX
[facility] DIVISION OF INFECTIOUS DISEASES  GISSELL Uriostegui Y. Patel, S. Shah, G. Terrell  [phone]  ([phone] - weekdays after 5pm and weekends)    Name: RAYRAY RODRIGUEZ  Age/Gender: 72y Female  MRN: [id number]    Interval History:  Patient seen and examined this morning.   Feels better today, waiting for SAFIA.   No new complaints noted.  Notes reviewed  No concerning overnight events  Afebrile     Allergies: tetanus toxoid (Short breath)  cefepime (Anaphylaxis)  penicillin (Anaphylaxis)  Avelox (Short breath; Pruritus)  Dilaudid (Short breath)  codeine (Short breath)  aspirin (Short breath)  iodine (Short breath; Swelling)  Valium (Short breath)  shellfish (Anaphylaxis)      Objective:  Vitals:   T(F): 98.7 (08-29-23 @ 00:16), Max: 98.7 (08-29-23 @ 00:16)  HR: 82 (08-29-23 @ 00:16) (82 - 90)  BP: 109/65 (08-29-23 @ 00:16) (109/65 - 120/73)  RR: 18 (08-29-23 @ 00:16) (18 - 18)  SpO2: 100% (08-29-23 @ 00:16) (100% - 100%)  Physical Examination:  General: no acute distress, RA  HEENT: NC/AT, anicteric, neck supple  Respiratory: no acc muscle use, breathing comfortably  Cardiovascular: S1 and S2 present  Gastrointestinal: normal appearing, nondistended  Extremities: no edema, no cyanosis  Skin: no visible rash    Laboratory Studies:  CBC:                       7.5    19.19 )-----------( 194      ( 29 Aug 2023 07:38 )             25.8     WBC Trend:  19.19 08-29-23 @ 07:38  21.03 08-28-23 @ 10:44  15.97 08-27-23 @ 11:23  13.95 08-26-23 @ 07:19  12.96 08-25-23 @ 11:38  11.97 08-24-23 @ 12:04  13.41 08-23-23 @ 07:16    CMP: 08-29    144  |  104  |  20  ----------------------------<  138<H>  4.3   |  25  |  0.81    Ca    9.2      29 Aug 2023 07:34  Phos  4.1     08-29  Mg     2.0     08-29    TPro  5.6<L>  /  Alb  3.6  /  TBili  0.9  /  DBili  x   /  AST  73<H>  /  ALT  30  /  AlkPhos  64  08-29    Creatinine: 0.81 mg/dL (08-29-23 @ 07:34)  Creatinine: 0.79 mg/dL (08-28-23 @ 10:44)  Creatinine: 0.65 mg/dL (08-27-23 @ 11:23)  Creatinine: 0.67 mg/dL (08-26-23 @ 07:20)  Creatinine: 0.64 mg/dL (08-25-23 @ 11:38)  Creatinine: 0.73 mg/dL (08-24-23 @ 12:04)  Creatinine: 0.71 mg/dL (08-23-23 @ 07:12)    LIVER FUNCTIONS - ( 29 Aug 2023 07:34 )  Alb: 3.6 g/dL / Pro: 5.6 g/dL / ALK PHOS: 64 U/L / ALT: 30 U/L / AST: 73 U/L / GGT: x           Microbiology: reviewed   Culture - Sputum (collected 08-26-23 @ 17:57)  Source: .Sputum Sputum  Gram Stain (08-27-23 @ 06:39):    Moderate polymorphonuclear leukocytes per low power field    No Squamous epithelial cells per low power field    Moderate Gram positive cocci in pairs seen per oil power field  Final Report (08-28-23 @ 22:06):    Normal Respiratory Jessica present    Culture - Sputum, Cystic Fibrosis (collected 08-18-23 @ 23:27)  Source: .Sputum Sputum  Gram Stain (08-19-23 @ 03:39):    No polymorphonuclear leukocytes per low power field    No Squamous epithelial cells per low power field    No organisms seen per oil power field  Final Report (08-22-23 @ 15:32):    Rare Methicillin Resistant Staphylococcus aureus    Rare Normal Respiratory Jessica present  Organism: Methicillin resistant Staphylococcus aureus (08-22-23 @ 15:32)  Organism: Methicillin resistant Staphylococcus aureus (08-22-23 @ 15:32)      Method Type: GARRETT      -  Ampicillin/Sulbactam: R <=8/4      -  Cefazolin: R >16      -  Clindamycin: S <=0.25      -  Erythromycin: R >4      -  Gentamicin: S <=1 Should not be used as monotherapy      -  Linezolid: S 2      -  Oxacillin: R >2      -  Penicillin: R >8      -  Rifampin: S <=1 Should not be used as monotherapy      -  Tetracycline: S <=1      -  Trimethoprim/Sulfamethoxazole: R >2/38      -  Vancomycin: S 2    Culture - Sputum (collected 08-18-23 @ 10:43)  Source: .Sputum Sputum  Gram Stain (08-18-23 @ 20:40):    No polymorphonuclear leukocytes per low power field    Rare Squamous epithelial cells per low power field    Moderate Gram positive cocci in pairs per oil power field    Few Gram Negative Rods per oil power field  Final Report (08-20-23 @ 19:06):    Normal Respiratory Jessica present    Culture - Blood (collected 08-17-23 @ 09:35)  Source: .Blood Blood-Peripheral  Final Report (08-22-23 @ 18:00):    No growth at 5 days    Culture - Blood (collected 08-17-23 @ 09:30)  Source: .Blood Blood-Peripheral  Final Report (08-22-23 @ 18:00):    No growth at 5 days    Culture - Sputum, Cystic Fibrosis (collected 08-17-23 @ 01:18)  Source: .Sputum Sputum  Gram Stain (08-17-23 @ 10:15):    Rare Squamous epithelial cells per low power field    No polymorphonuclear leukocytes per low power field    Rare Gram positive cocci in pairs per oil power field  Final Report (08-21-23 @ 09:41):    Moderate Methicillin Resistant Staphylococcus aureus    Rare Pantoea agglomerans    Numerous Normal Respiratory Jessica present  Organism: Methicillin resistant Staphylococcus aureus  Pantoea agglomerans (Previousley Enterobacter) (08-21-23 @ 09:41)  Organism: Pantoea agglomerans (Previousley Enterobacter) (08-21-23 @ 09:41)      Method Type: GARRETT      -  Amikacin: S <=16      -  Amoxicillin/Clavulanic Acid: S <=8/4      -  Ampicillin: S <=8 These ampicillin results predict results for amoxicillin      -  Ampicillin/Sulbactam: S <=4/2 Enterobacter, Klebsiella aerogenes, Citrobacter, and Serratia may develop resistance during prolonged therapy (3-4 days)      -  Aztreonam: S <=4      -  Cefazolin: S <=2 Enterobacter, Klebsiella aerogenes, Citrobacter, and Serratia may develop resistance during prolonged therapy (3-4 days)      -  Cefepime: S <=2      -  Cefoxitin: S <=8      -  Ceftriaxone: S <=1 Enterobacter, Klebsiella aerogenes, Citrobacter, and Serratia may develop resistance during prolonged therapy      -  Ciprofloxacin: S <=0.25      -  Ertapenem: S <=0.5      -  Gentamicin: S <=2      -  Levofloxacin: S <=0.5      -  Meropenem: S <=1      -  Piperacillin/Tazobactam: S <=8      -  Tobramycin: S <=2      -  Trimethoprim/Sulfamethoxazole: S <=0.5/9.5  Organism: Methicillin resistant Staphylococcus aureus (08-21-23 @ 09:41)      Method Type: GARRETT      -  Ampicillin/Sulbactam: R <=8/4      -  Cefazolin: R >16      -  Clindamycin: S <=0.25      -  Erythromycin: R >4      -  Gentamicin: S <=1 Should not be used as monotherapy      -  Linezolid: S 2      -  Oxacillin: R >2      -  Penicillin: R >8      -  Rifampin: S <=1 Should not be used as monotherapy      -  Tetracycline: S 2      -  Trimethoprim/Sulfamethoxazole: R >2/38      -  Vancomycin: S 2    Culture - Sputum (collected 08-15-23 @ 12:49)  Source: .Sputum Sputum  Gram Stain (08-15-23 @ 21:07):    Rare Squamous epithelial cells per low power field    Rare polymorphonuclear leukocytes per low power field    Rare Yeast like cells per oil power field  Final Report (08-17-23 @ 18:08):    Normal Respiratory Jessica present    SARS-CoV-2 Result: NotDetec (11 Aug 2023 16:06)    Radiology: reviewed     Medications:  acetaminophen     Tablet .. 650 milliGRAM(s) Oral every 6 hours PRN  albuterol/ipratropium for Nebulization 3 milliLiter(s) Nebulizer every 6 hours  aluminum hydroxide/magnesium hydroxide/simethicone Suspension 30 milliLiter(s) Oral every 4 hours PRN  apixaban 5 milliGRAM(s) Oral every 12 hours  atorvastatin 20 milliGRAM(s) Oral at bedtime  buDESOnide    Inhalation Suspension 0.5 milliGRAM(s) Inhalation two times a day  chlorhexidine 4% Liquid 1 Application(s) Topical daily  clotrimazole 1% Cream 1 Application(s) Topical two times a day  dextrose 5%. 1000 milliLiter(s) IV Continuous <Continuous>  dextrose 5%. 1000 milliLiter(s) IV Continuous <Continuous>  dextrose 50% Injectable 25 Gram(s) IV Push once  dextrose 50% Injectable 12.5 Gram(s) IV Push once  dextrose 50% Injectable 25 Gram(s) IV Push once  dextrose Oral Gel 15 Gram(s) Oral once PRN  diphenhydrAMINE 25 milliGRAM(s) Oral daily PRN  folic acid 1 milliGRAM(s) Oral daily  glucagon  Injectable 1 milliGRAM(s) IntraMuscular once  insulin glargine Injectable (LANTUS) 15 Unit(s) SubCutaneous at bedtime  insulin lispro (ADMELOG) corrective regimen sliding scale   SubCutaneous <User Schedule>  insulin lispro (ADMELOG) corrective regimen sliding scale   SubCutaneous three times a day before meals  insulin lispro Injectable (ADMELOG) 16 Unit(s) SubCutaneous before breakfast  insulin lispro Injectable (ADMELOG) 18 Unit(s) SubCutaneous before lunch  insulin lispro Injectable (ADMELOG) 18 Unit(s) SubCutaneous before dinner  melatonin 3 milliGRAM(s) Oral at bedtime PRN  methylPREDNISolone   Oral   methylPREDNISolone 24 milliGRAM(s) Oral daily  mexiletine 200 milliGRAM(s) Oral every 8 hours  nystatin    Suspension 640929 Unit(s) Swish and Swallow two times a day  ondansetron Injectable 4 milliGRAM(s) IV Push every 8 hours PRN  pantoprazole    Tablet 40 milliGRAM(s) Oral before breakfast  polyethylene glycol 3350 17 Gram(s) Oral two times a day  senna 2 Tablet(s) Oral at bedtime  sodium chloride 7% Inhalation 4 milliLiter(s) Inhalation every 6 hours  tiotropium 2.5 MICROgram(s) Inhaler 2 Puff(s) Inhalation daily  trimethoprim  160 mG/sulfamethoxazole 800 mG 1 Tablet(s) Oral daily    Current Antimicrobials:  nystatin    Suspension [id number] Unit(s) Swish and Swallow two times a day  trimethoprim  160 mG/sulfamethoxazole 800 mG 1 Tablet(s) Oral daily    Prior/Completed Antimicrobials:  ertapenem  IVPB  ertapenem  IVPB  linezolid    Tablet

## 2023-08-29 NOTE — PROGRESS NOTE ADULT - PROBLEM SELECTOR PLAN 2
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  If so, she will need a Gated Cardiac CT to look at valvular anatomy, as well as access points. 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  If so, she will need a Gated Cardiac CT to look at valvular anatomy, as well as access points.  - SAFIA cancelled due to cardiac team concerns with patient stability regarding leukocytosis, infection risk, and low hemoglobin - want to perform procedures outpatient after patient more stable.

## 2023-08-29 NOTE — CHART NOTE - NSCHARTNOTEFT_GEN_A_CORE
SAFIA Chart Note    ID: 71 yo PMH bioAVR s/p Bental procedure, asthma on chronic steroids, bronchiectasis s/p surgery, allergic rhinitis, recurrent PNA, PND, tracheomalacia s/p tracheoplasty, ESBL proteus infections (sputum),  diabetes, paroxysmal A-fib on Eliquis, colorectal cancer many years ago status post colostomy presenting with concern for shortness of breath found to have bronchial secretions on CT, admit to medicine for exacerbation of bronchiectasis. Sputum culture growing MRSA, completed 7 days linezolid. Anemic, possibly hemolytic from TAVR. Pending possible TAV in [place] with structural, pending SAFIA for AVR assessment.    Case reviewed with SAFIA attending Dr. Rivera and structural attending Dr. Leahy. Patient is currently admitted for PNA on antibiotics, on MRSA contact for +SCx, persistent dyspnea, and anemia with hb 7.5. The patient does not appear to be a stable candidate for an elective SAFIA at this time as risks outweigh benefits and her intervention is planned to be scheduled as an outpatient anyways.    SAFIA case will be canceled and rescheduled as an outpatient once pt is recovered from PNA, anemia is worked up, and pt clinically improved.  Primary team notified as well.    Scott Godwin PGY5  Cardiology Fellow    ANALIA Rivera

## 2023-08-29 NOTE — PROGRESS NOTE ADULT - PROBLEM SELECTOR PLAN 5
Diet: dash consistent carb  Dvt: on Eliquis  Dispo: [facility] Diet: dash consistent carb  Dvt: on Eliquis  Dispo: home and home PT

## 2023-08-29 NOTE — PROGRESS NOTE ADULT - NUTRITIONAL ASSESSMENT
This patient has been assessed with a concern for Malnutrition and has been determined to have a diagnosis/diagnoses of Moderate protein-calorie malnutrition.    This patient is being managed with:   Diet NPO after Midnight-     NPO Start Date: 28-Aug-2023   NPO Start Time: 23:59  Entered: Aug 28 2023  2:51PM    Diet Regular-  Consistent Carbohydrate {Evening Snack} (CSTCHOSN)  Supplement Feeding Modality:  Oral  Glucerna Shake Cans or Servings Per Day:  3       Frequency:  Three Times a day  Entered: Aug 25 2023  2:56PM   This patient has been assessed with a concern for Malnutrition and has been determined to have a diagnosis/diagnoses of Moderate protein-calorie malnutrition.  Diet Regular-  Consistent Carbohydrate {Evening Snack} (CSTCHOSN)  Supplement Feeding Modality:  Oral  Glucerna Shake Cans or Servings Per Day:  3       Frequency:  Three Times a day  Entered: Aug 25 2023  2:56PM

## 2023-08-29 NOTE — PROGRESS NOTE ADULT - ASSESSMENT
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 P/w SOB, found to have bronchial secretions on CT, admit to medicine for exacerbation of bronchiectasis. Sputum culture growing MRSA, completed 7 days linezolid. Anemic, possibly hemolytic from TAVR. Deconditioning, planning for dc to [facility], [place]. 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 P/w SOB, found to have bronchial secretions on CT, admit to medicine for exacerbation of bronchiectasis. Sputum culture growing MRSA, completed 7 days linezolid. Anemic, likely hemolytic from TAVR according to heme. SAFIA today cancelled due to cardiology team concerns for patient stability. Deconditioning, planning for dc to home with rehab options..

## 2023-08-29 NOTE — PROGRESS NOTE ADULT - PROBLEM SELECTOR PLAN 1
- CT chest showed increased bronchial secretions, on RA saturating well, speaking in full sentences, unclear trigger, likely due to infection, or inflammation.     - sputum culture grew MRSA, s/p 7 day course of ertapenem, followed by another 7 days course of linezolid   - hypertonic saline 7% and albuterol neb q6h standing, followed by use of airway clearance device (patient brought her own from home)  - chest PT as tolerated   - budesonide 0.5mg neb q12h standing   - now on solumedrol taper - 24mg, decrease by 8mg every week to 8 mg  - continue with bactrim for PCP ppx   - pulm following, appreciate recs  - ID following, WBC likely in relation to steroids  - echo: hyperdynamic LV, likely normal RV function. - CT chest showed increased bronchial secretions, on RA saturating well, speaking in full sentences, unclear trigger, likely due to infection, or inflammation.     - sputum culture grew MRSA, s/p 7 day course of ertapenem, followed by another 7 days course of linezolid   - hypertonic saline 7% and albuterol neb q6h standing, followed by use of airway clearance device (patient brought her own from home)  - chest PT as tolerated   - budesonide 0.5mg neb q12h standing   - now on solumedrol taper - 24mg, decrease by 8mg every week to 8 mg  - continue with bactrim for PCP ppx   - pulm following, appreciate recs  - ID following, persistent leukocytosis likely in relation to steroids  - throacic echo: hyperdynamic LV, likely normal RV function.

## 2023-08-30 LAB
ALBUMIN SERPL ELPH-MCNC: 3.3 G/DL — SIGNIFICANT CHANGE UP (ref 3.3–5)
ALP SERPL-CCNC: 67 U/L — SIGNIFICANT CHANGE UP (ref 40–120)
ALT FLD-CCNC: 30 U/L — SIGNIFICANT CHANGE UP (ref 10–45)
ANION GAP SERPL CALC-SCNC: 11 MMOL/L — SIGNIFICANT CHANGE UP (ref 5–17)
AST SERPL-CCNC: 75 U/L — HIGH (ref 10–40)
BILIRUB DIRECT SERPL-MCNC: 0.2 MG/DL — SIGNIFICANT CHANGE UP (ref 0–0.3)
BILIRUB INDIRECT FLD-MCNC: 0.7 MG/DL — SIGNIFICANT CHANGE UP (ref 0.2–1)
BILIRUB SERPL-MCNC: 0.9 MG/DL — SIGNIFICANT CHANGE UP (ref 0.2–1.2)
BILIRUB SERPL-MCNC: 0.9 MG/DL — SIGNIFICANT CHANGE UP (ref 0.2–1.2)
BUN SERPL-MCNC: 18 MG/DL — SIGNIFICANT CHANGE UP (ref 7–23)
CALCIUM SERPL-MCNC: 8.8 MG/DL — SIGNIFICANT CHANGE UP (ref 8.4–10.5)
CHLORIDE SERPL-SCNC: 101 MMOL/L — SIGNIFICANT CHANGE UP (ref 96–108)
CO2 SERPL-SCNC: 25 MMOL/L — SIGNIFICANT CHANGE UP (ref 22–31)
CREAT SERPL-MCNC: 0.71 MG/DL — SIGNIFICANT CHANGE UP (ref 0.5–1.3)
EGFR: 89 ML/MIN/1.73M2 — SIGNIFICANT CHANGE UP
FIBRINOGEN PPP-MCNC: 442 MG/DL — SIGNIFICANT CHANGE UP (ref 200–445)
GLUCOSE BLDC GLUCOMTR-MCNC: 149 MG/DL — HIGH (ref 70–99)
GLUCOSE BLDC GLUCOMTR-MCNC: 234 MG/DL — HIGH (ref 70–99)
GLUCOSE BLDC GLUCOMTR-MCNC: 275 MG/DL — HIGH (ref 70–99)
GLUCOSE BLDC GLUCOMTR-MCNC: 305 MG/DL — HIGH (ref 70–99)
GLUCOSE SERPL-MCNC: 319 MG/DL — HIGH (ref 70–99)
HAPTOGLOB SERPL-MCNC: <20 MG/DL — LOW (ref 34–200)
HCT VFR BLD CALC: 24.3 % — LOW (ref 34.5–45)
HCT VFR BLD CALC: 32 % — LOW (ref 34.5–45)
HGB BLD-MCNC: 7.2 G/DL — LOW (ref 11.5–15.5)
HGB BLD-MCNC: 9.5 G/DL — LOW (ref 11.5–15.5)
LDH SERPL L TO P-CCNC: 2024 U/L — HIGH (ref 50–242)
MAGNESIUM SERPL-MCNC: 2 MG/DL — SIGNIFICANT CHANGE UP (ref 1.6–2.6)
MCHC RBC-ENTMCNC: 23 PG — LOW (ref 27–34)
MCHC RBC-ENTMCNC: 24.1 PG — LOW (ref 27–34)
MCHC RBC-ENTMCNC: 29.6 GM/DL — LOW (ref 32–36)
MCHC RBC-ENTMCNC: 29.7 GM/DL — LOW (ref 32–36)
MCV RBC AUTO: 77.6 FL — LOW (ref 80–100)
MCV RBC AUTO: 81 FL — SIGNIFICANT CHANGE UP (ref 80–100)
NRBC # BLD: 72 /100 WBCS — HIGH (ref 0–0)
NRBC # BLD: 77 /100 WBCS — HIGH (ref 0–0)
PHOSPHATE SERPL-MCNC: 3.8 MG/DL — SIGNIFICANT CHANGE UP (ref 2.5–4.5)
PLATELET # BLD AUTO: 172 K/UL — SIGNIFICANT CHANGE UP (ref 150–400)
PLATELET # BLD AUTO: 187 K/UL — SIGNIFICANT CHANGE UP (ref 150–400)
POTASSIUM SERPL-MCNC: 4.7 MMOL/L — SIGNIFICANT CHANGE UP (ref 3.5–5.3)
POTASSIUM SERPL-SCNC: 4.7 MMOL/L — SIGNIFICANT CHANGE UP (ref 3.5–5.3)
PROT SERPL-MCNC: 5.4 G/DL — LOW (ref 6–8.3)
RBC # BLD: 3.13 M/UL — LOW (ref 3.8–5.2)
RBC # BLD: 3.13 M/UL — LOW (ref 3.8–5.2)
RBC # BLD: 3.95 M/UL — SIGNIFICANT CHANGE UP (ref 3.8–5.2)
RBC # FLD: 33.2 % — HIGH (ref 10.3–14.5)
RBC # FLD: 37.1 % — HIGH (ref 10.3–14.5)
RETICS #: 240.6 K/UL — HIGH (ref 25–125)
RETICS/RBC NFR: 7.6 % — HIGH (ref 0.5–2.5)
SODIUM SERPL-SCNC: 137 MMOL/L — SIGNIFICANT CHANGE UP (ref 135–145)
WBC # BLD: 13.55 K/UL — HIGH (ref 3.8–10.5)
WBC # BLD: 14.45 K/UL — HIGH (ref 3.8–10.5)
WBC # FLD AUTO: 13.55 K/UL — HIGH (ref 3.8–10.5)
WBC # FLD AUTO: 14.45 K/UL — HIGH (ref 3.8–10.5)

## 2023-08-30 PROCEDURE — 93325 DOPPLER ECHO COLOR FLOW MAPG: CPT | Mod: 26

## 2023-08-30 PROCEDURE — 76377 3D RENDER W/INTRP POSTPROCES: CPT | Mod: 26

## 2023-08-30 PROCEDURE — 93312 ECHO TRANSESOPHAGEAL: CPT | Mod: 26

## 2023-08-30 PROCEDURE — 93320 DOPPLER ECHO COMPLETE: CPT | Mod: 26

## 2023-08-30 PROCEDURE — 99233 SBSQ HOSP IP/OBS HIGH 50: CPT | Mod: GC

## 2023-08-30 PROCEDURE — 99232 SBSQ HOSP IP/OBS MODERATE 35: CPT | Mod: FS

## 2023-08-30 RX ORDER — INSULIN GLARGINE 100 [IU]/ML
30 INJECTION, SOLUTION SUBCUTANEOUS AT BEDTIME
Refills: 0 | Status: DISCONTINUED | OUTPATIENT
Start: 2023-08-30 | End: 2023-08-31

## 2023-08-30 RX ADMIN — SENNA PLUS 2 TABLET(S): 8.6 TABLET ORAL at 21:57

## 2023-08-30 RX ADMIN — Medication 16 MILLIGRAM(S): at 05:40

## 2023-08-30 RX ADMIN — ATORVASTATIN CALCIUM 20 MILLIGRAM(S): 80 TABLET, FILM COATED ORAL at 21:57

## 2023-08-30 RX ADMIN — Medication 8: at 09:28

## 2023-08-30 RX ADMIN — APIXABAN 5 MILLIGRAM(S): 2.5 TABLET, FILM COATED ORAL at 05:37

## 2023-08-30 RX ADMIN — Medication 0.5 MILLIGRAM(S): at 05:36

## 2023-08-30 RX ADMIN — SODIUM CHLORIDE 4 MILLILITER(S): 9 INJECTION INTRAMUSCULAR; INTRAVENOUS; SUBCUTANEOUS at 05:55

## 2023-08-30 RX ADMIN — Medication 3 MILLILITER(S): at 00:01

## 2023-08-30 RX ADMIN — Medication 500000 UNIT(S): at 05:41

## 2023-08-30 RX ADMIN — APIXABAN 5 MILLIGRAM(S): 2.5 TABLET, FILM COATED ORAL at 21:57

## 2023-08-30 RX ADMIN — POLYETHYLENE GLYCOL 3350 17 GRAM(S): 17 POWDER, FOR SOLUTION ORAL at 05:36

## 2023-08-30 RX ADMIN — INSULIN GLARGINE 30 UNIT(S): 100 INJECTION, SOLUTION SUBCUTANEOUS at 22:12

## 2023-08-30 RX ADMIN — MEXILETINE HYDROCHLORIDE 200 MILLIGRAM(S): 150 CAPSULE ORAL at 05:37

## 2023-08-30 RX ADMIN — CHLORHEXIDINE GLUCONATE 1 APPLICATION(S): 213 SOLUTION TOPICAL at 18:50

## 2023-08-30 RX ADMIN — Medication 4: at 13:02

## 2023-08-30 RX ADMIN — SODIUM CHLORIDE 4 MILLILITER(S): 9 INJECTION INTRAMUSCULAR; INTRAVENOUS; SUBCUTANEOUS at 13:03

## 2023-08-30 RX ADMIN — Medication 2: at 01:59

## 2023-08-30 RX ADMIN — PANTOPRAZOLE SODIUM 40 MILLIGRAM(S): 20 TABLET, DELAYED RELEASE ORAL at 06:18

## 2023-08-30 RX ADMIN — MEXILETINE HYDROCHLORIDE 200 MILLIGRAM(S): 150 CAPSULE ORAL at 21:57

## 2023-08-30 RX ADMIN — Medication 1 APPLICATION(S): at 05:36

## 2023-08-30 RX ADMIN — Medication 3 MILLILITER(S): at 13:03

## 2023-08-30 RX ADMIN — Medication 3 MILLILITER(S): at 05:36

## 2023-08-30 RX ADMIN — SODIUM CHLORIDE 4 MILLILITER(S): 9 INJECTION INTRAMUSCULAR; INTRAVENOUS; SUBCUTANEOUS at 00:01

## 2023-08-30 NOTE — PROGRESS NOTE ADULT - SUBJECTIVE AND OBJECTIVE BOX
Hematology Oncology Follow-up    INTERVAL HPI/OVERNIGHT EVENTS:  No o/n events, patient resting comfortably.    VITAL SIGNS:  T(F): 97.9 (08-30-23 @ 06:16)  HR: 91 (08-30-23 @ 06:16)  BP: 91/57 (08-30-23 @ 06:16)  RR: 18 (08-30-23 @ 06:16)  SpO2: 96% (08-30-23 @ 06:16)  Wt(kg): --    08-29-23 @ 07:01  -  08-30-23 @ 07:00  --------------------------------------------------------  IN: 240 mL / OUT: 0 mL / NET: 240 mL        PHYSICAL EXAM:    Constitutional: AAOx3, NAD  Eyes: PERRL, EOMI, sclera non-icteric  Neck: supple, no masses, no JVD, no lymphadenopathy  Respiratory: CTA b/l, no wheezing, rhonchi, rales, with normal respiratory effort  Cardiovascular: RRR, normal S1S2, no M/R/G  Gastrointestinal: soft, NTND, no masses palpable, BS normal in all four quadrants, no HSM  Extremities:  no edema  MSK: no obvious abnormalities, normal ROM, no lymphadenopathy  Neurological: Grossly intact  Skin: Normal temperature, no rash, no echymoses, no petichiae  Psych: normal affect    MEDICATIONS  (STANDING):  acetaminophen   IVPB .. 1000 milliGRAM(s) IV Intermittent once  albuterol/ipratropium for Nebulization 3 milliLiter(s) Nebulizer every 6 hours  apixaban 5 milliGRAM(s) Oral every 12 hours  atorvastatin 20 milliGRAM(s) Oral at bedtime  buDESOnide    Inhalation Suspension 0.5 milliGRAM(s) Inhalation two times a day  chlorhexidine 4% Liquid 1 Application(s) Topical daily  clotrimazole 1% Cream 1 Application(s) Topical two times a day  dextrose 5%. 1000 milliLiter(s) (100 mL/Hr) IV Continuous <Continuous>  dextrose 5%. 1000 milliLiter(s) (50 mL/Hr) IV Continuous <Continuous>  dextrose 50% Injectable 25 Gram(s) IV Push once  dextrose 50% Injectable 12.5 Gram(s) IV Push once  dextrose 50% Injectable 25 Gram(s) IV Push once  folic acid 1 milliGRAM(s) Oral daily  glucagon  Injectable 1 milliGRAM(s) IntraMuscular once  insulin glargine Injectable (LANTUS) 25 Unit(s) SubCutaneous at bedtime  insulin lispro (ADMELOG) corrective regimen sliding scale   SubCutaneous <User Schedule>  insulin lispro (ADMELOG) corrective regimen sliding scale   SubCutaneous three times a day before meals  insulin lispro Injectable (ADMELOG) 18 Unit(s) SubCutaneous before dinner  insulin lispro Injectable (ADMELOG) 12 Unit(s) SubCutaneous before breakfast  insulin lispro Injectable (ADMELOG) 16 Unit(s) SubCutaneous before lunch  methylPREDNISolone   Oral   methylPREDNISolone 16 milliGRAM(s) Oral daily  mexiletine 200 milliGRAM(s) Oral every 8 hours  nystatin    Suspension [id number] Unit(s) Swish and Swallow two times a day  pantoprazole    Tablet 40 milliGRAM(s) Oral before breakfast  polyethylene glycol 3350 17 Gram(s) Oral two times a day  senna 2 Tablet(s) Oral at bedtime  sodium chloride 7% Inhalation 4 milliLiter(s) Inhalation every 6 hours  tiotropium 2.5 MICROgram(s) Inhaler 2 Puff(s) Inhalation daily  trimethoprim  160 mG/sulfamethoxazole 800 mG 1 Tablet(s) Oral daily    MEDICATIONS  (PRN):  acetaminophen     Tablet .. 650 milliGRAM(s) Oral every 6 hours PRN Temp greater or equal to 38C (100.4F), Mild Pain (1 - 3)  aluminum hydroxide/magnesium hydroxide/simethicone Suspension 30 milliLiter(s) Oral every 4 hours PRN Dyspepsia  dextrose Oral Gel 15 Gram(s) Oral once PRN Blood Glucose LESS THAN 70 milliGRAM(s)/deciliter  diphenhydrAMINE 25 milliGRAM(s) Oral daily PRN Allergy symptoms  melatonin 3 milliGRAM(s) Oral at bedtime PRN Insomnia  ondansetron Injectable 4 milliGRAM(s) IV Push every 8 hours PRN Nausea and/or Vomiting      tetanus toxoid (Short breath)  cefepime (Anaphylaxis)  penicillin (Anaphylaxis)  Avelox (Short breath; Pruritus)  Dilaudid (Short breath)  codeine (Short breath)  aspirin (Short breath)  iodine (Short breath; Swelling)  Valium (Short breath)  shellfish (Anaphylaxis)      LABS:                        7.2    13.55 )-----------( 187      ( 30 Aug 2023 07:04 )             24.3     08-30    137  |  101  |  18  ----------------------------<  319<H>  4.7   |  25  |  0.71    Ca    8.8      30 Aug 2023 07:06  Phos  3.8     08-30  Mg     2.0     08-30    TPro  5.4<L>  /  Alb  3.3  /  TBili  0.9  /  DBili  0.2  /  AST  75<H>  /  ALT  30  /  AlkPhos  67  08-30    PT/INR - ( 29 Aug 2023 07:36 )   PT: 11.0 sec;   INR: 1.00 ratio         PTT - ( 29 Aug 2023 07:36 )  PTT:26.0 sec Haptoglobin, Serum: <20 mg/dL (08-30 @ 07:08)  Lactate Dehydrogenase, Serum: 2024 U/L (08-30 @ 07:06)    Urinalysis Basic - ( 30 Aug 2023 07:06 )    Color: x / Appearance: x / SG: x / pH: x  Gluc: 319 mg/dL / Ketone: x  / Bili: x / Urobili: x   Blood: x / Protein: x / Nitrite: x   Leuk Esterase: x / RBC: x / WBC x   Sq Epi: x / Non Sq Epi: x / Bacteria: x          Vitamin B12, Serum: 539 pg/mL (08-26-23 @ 07:21)  Folate, Serum: 12.3 ng/mL (08-26-23 @ 07:21)      Bilirubin Direct: 0.2 (08-30-23 @ 07:06)      RADIOLOGY & ADDITIONAL TESTS:  Studies reviewed.

## 2023-08-30 NOTE — PROGRESS NOTE ADULT - PROBLEM SELECTOR PLAN 4
- on 25-30 lantus at home, and 7-20 mealtime as well  - patient's blood glucose levels were initially difficult to control given high doses of steroids, now on taper   - endo following and dosing insulin accordingly Diabetes:  - On 25-30 lantus at home, and 7-20 mealtime as well  - Goal less than >200  - Initially had steroid induced hyperglycemia, now on taper   - Endo following and dosing insulin accordingly

## 2023-08-30 NOTE — PROGRESS NOTE ADULT - NS ATTEND AMEND GEN_ALL_CORE FT
The Structural Heart team was asked to evaluate Ms Bloom in the context of recently progressive SOB in the setting of complex cardiac and pulmonary disease (see details above). 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 She tells me that she ultimately recovered and feels she was doing well until a month ago when she had an acute onset and severe episode of SOB when walking from her living room to her kitchen--ultimately leading to this hospitalization. Her TTE demonstrated elevated bio AVR gradients; a SAFIA today confirmed severe bioprosthetic AV stenosis and severe mitral regurgitation (which may be largely secondary to the AS and elevated LV filling pressures). I have reviewed the SAFIA findings with her in detail (and with Dr Cabrera by phone). We are in agreement that her AS will need to be addressed and that she is a poor candidate for re-op AVR. As such, I am recommending a cardiac structural CT to determine if her anatomy is feasible for a Valve-in-Valve TAVR procedure (aka TAV in [place]). She has extensive residual dissection in the descending aorta that extends to the iliac bifurcation which would preclude femoral access for MEL. As such, I will review her CT with Dr Darrius Elizabeth to determine if TC (transcarotid) access for her MEL is a feasible option. I would propose a timeline of CT this week, determine if her anatomy is feasible, and if so, keep her in the hospital for the procedure some time next week (timing TBD). I discussed my impressions and plan with her in detail and she is amenable to the above, as outlined. As per her request, I have also discussed all of the above with her daughter, Zoe, by phone ([phone]), and she is in agreement with this plan.  Steven Leahy MD

## 2023-08-30 NOTE — PRE-ANESTHESIA EVALUATION ADULT - NSRADCARDRESULTSFT_GEN_ALL_CORE
TTE 8/24/23:    CONCLUSIONS:      1. Left ventricular systolic function is hyperdynamic with an ejection fraction visually estimated at 75 %.   2. Severe aortic stenosis.    ________________________________________________________________________________________  FINDINGS:     Left Ventricle:  Left ventricular systolic function is hyperdynamic with an ejection fraction visually estimated at 75%.     Aortic Valve:  A transcatheter deployed (TAVR) (valve-in-valve) is present in the aortic position. The effective orifice area (by Continuity Equation) is 0.79 cm² (indexed area = 0.45 cm²/m²) with a Doppler velocity index of 0.31. The peak veloicty is 4.59 m/s, peak gradient is 84.3 mmHg and mean gradient is 56.0 mmHg. There is severe aortic stenosis. The highest velocity was obtained from the apical window. There is mild aortic regurgitation.     Mitral Valve:  There is calcification of the mitral valve annulus.  ____________________________________________________________________  Quantitative Data:  Left Ventricle Measurements: (Indexed to BSA)     Visualized LV EF%: 75%

## 2023-08-30 NOTE — CHART NOTE - NSCHARTNOTEFT_GEN_A_CORE
Nutrition Follow Up Note  Patient seen for: Consult, A1C 9.1%     Chart reviewed, events noted.  Per chart, "72F hx bronchiectasis, trachomalacia on chronic steroids, DM, pAfib on Eliquis, colorectal ca yrs ago s/p colostomy, recent admission for parainfluenza PNA on various antibiotics see HPI . P/w SOB, found to have bronchial secretions on CT, admit to medicine for exacerbation of bronchiectasis. Sputum culture growing MRSA, completed 7 days linezolid. Anemic, likely hemolytic from TAVR according to heme. SAFIA today cancelled due to cardiology team concerns for patient stability. Deconditioning, planning for dc to home with rehab options."    Source: [X] Patient       [x] Electronic Medical Record       [] RN        [] Family at bedside       [] Other:    -If unable to interview patient: [] Trach/Vent/BiPAP  [] Disoriented/confused/inappropriate to interview    Diet Order:   Diet, NPO:   NPO for Procedure/Test     NPO Start Date: 30-Aug-2023,   NPO Start Time: 00:00  Except Medications (23)  Diet, Regular:   Consistent Carbohydrate {Evening Snack} (CSTCHOSN)  Supplement Feeding Modality:  Oral  Glucerna Shake Cans or Servings Per Day:  3       Frequency:  Three Times a day (23)    - Is current order appropriate/adequate? [] Yes  []  No: See recommendations for details.     - PO intake :   [X] >75%  Adequate    [] 50-75%  Fair       [] <50%  Poor  - Per flow sheets, pt has been eating %.   - Pt stated her appetite has been good in-house, eating ~75% of the trays provided at each meal.     - Nutrition-related concerns:    GI:  Last BM ___.   Bowel Regimen? [] Yes   [] No      Weights:   Daily Weight in k ()    Nutritionally Pertinent MEDICATIONS  (STANDING):  atorvastatin  dextrose 5%.  dextrose 5%.  dextrose 50% Injectable  dextrose 50% Injectable  dextrose 50% Injectable  folic acid  glucagon  Injectable  insulin glargine Injectable (LANTUS)  insulin lispro (ADMELOG) corrective regimen sliding scale  insulin lispro (ADMELOG) corrective regimen sliding scale  insulin lispro Injectable (ADMELOG)  insulin lispro Injectable (ADMELOG)  insulin lispro Injectable (ADMELOG)  methylPREDNISolone  methylPREDNISolone  mexiletine  nystatin    Suspension  pantoprazole    Tablet  polyethylene glycol 3350  senna  trimethoprim  160 mG/sulfamethoxazole 800 mG    Pertinent Labs:  @ 07:06: Na 137, BUN 18, Cr 0.71, <H>, K+ 4.7, Phos 3.8, Mg 2.0, Alk Phos 67, ALT/SGPT 30, AST/SGOT 75<H>, HbA1c --    A1C with Estimated Average Glucose Result: 9.1 % (23 @ 10:27)  A1C with Estimated Average Glucose Result: 9.4 % (23 @ 07:40)  A1C with Estimated Average Glucose Result: 10.3 % (23 @ 06:35)    Finger Sticks:  POCT Blood Glucose.: 305 mg/dL ( @ 08:44)  POCT Blood Glucose.: 275 mg/dL ( @ 01:56)  POCT Blood Glucose.: 118 mg/dL ( @ 21:12)  POCT Blood Glucose.: 110 mg/dL ( @ 17:34)  POCT Blood Glucose.: 126 mg/dL ( @ 13:39)      Skin per nursing documentation:   Edema:     Estimated Needs:   [] no change since previous assessment  [] recalculated:     Previous Nutrition Diagnosis:   Nutrition Diagnosis is: [] ongoing  [] resolved [] not applicable     New Nutrition Diagnosis: [] Not applicable    Nutrition Care Plan:  [] In Progress  [] Achieved  [] Not applicable    Nutrition Interventions:     Education Provided:       [] Yes:  [] No:        Recommendations:         [] Continue current diet order            [] Add oral nutrition supplement:     [] Discontinue current diet order. Recommend:      [] Add micronutrient supplementation:      [] Continue current micronutrient supplementation:      [] Other:     Monitoring and Evaluation:   Continue to monitor nutritional intake, tolerance to diet prescription, weights, labs, skin integrity      RD remains available upon request and will follow up per protocol Nutrition Follow Up Note  Patient seen for: Consult, A1C 9.1%     Chart reviewed, events noted.  Per chart, "72F hx bronchiectasis, trachomalacia on chronic steroids, DM, pAfib on Eliquis, colorectal ca yrs ago s/p colostomy, recent admission for parainfluenza PNA on various antibiotics see HPI . P/w SOB, found to have bronchial secretions on CT, admit to medicine for exacerbation of bronchiectasis. Sputum culture growing MRSA, completed 7 days linezolid. Anemic, likely hemolytic from TAVR according to heme. Deconditioning, planning for dc to home with rehab options."    Source: [X] Patient       [x] Electronic Medical Record       [] RN        [] Family at bedside       [] Other:    -If unable to interview patient: [] Trach/Vent/BiPAP  [] Disoriented/confused/inappropriate to interview    Diet Order:   Diet, NPO:   NPO for Procedure/Test     NPO Start Date: 30-Aug-2023,   NPO Start Time: 00:00  Except Medications (08-30-23)  Diet, Regular:   Consistent Carbohydrate {Evening Snack} (CSTCHOSN)  Supplement Feeding Modality:  Oral  Glucerna Shake Cans or Servings Per Day:  3       Frequency:  Three Times a day (08-25-23)    - Is current order appropriate/adequate? [] Yes  []  No: See recommendations for details.     - PO intake :   [X] >75%  Adequate    [] 50-75%  Fair       [] <50%  Poor  - Per flow sheets, pt has been eating %.   - Pt stated her appetite has been good in-house, eating ~75% of the trays provided at each meal.   - Pt states she was consuming the Glucerna supplement 3x/day, however has not received it the past 2 days. RD to adjust supplement order.   - Pt states she's been calling down and ordering her meals daily. Pt is fatigued of the menu, but is eating well. RD obtained food preferences, will honor as able.     - Nutrition-related concerns:  - Pt is currently NPO in setting of SAFIA test today (per chart).   - Pt ordered for antibiotics in-house: trimethoprim (per orders).   - Pt with T2DM, on insulin regimen in-house: Insulin Lispro Injectable, Insulin Glargine Injectable, Insulin Lispro (ADMELOG) Corrective Regimen Sliding Scale.   - Pt is ordered for methylprednisolone (per orders), may elevate blood glucose levels.   - A1C 9.1% (06-17) indicating poor glycemic control (per chart).   - Pt with anemia, s/p PRBC transfusion today (08-30).     GI:  Last documented BM 08-27 (per flow sheets).     - Note: Pt with a colostomy (per chart).   - Pt reports no GI disturbances, no changes in bowel movements.   Bowel Regimen? [X] Yes: Senna, Miralax (per orders)  Pt reported no nausea or vomiting.  - Note: Pt ordered for protonix, aluminum hydroxide/magnesium hydroxide/simethicone suspension, and zofran (per orders).     Weights:   Daily Weights: 145.5 pounds (08-30), 143 pounds (08-16) (standing) (per chart)  Dosing Weights: 138.8 pounds (08-11) (per chart)  Weights per [facility]: 143.1 pounds (08-11), 143 pounds (07-25), 140 pounds (06-04), 130.15 pounds (05-05), 135 pounds (03-19, 129 pounds (02-03), 129 pounds (01-24)  Current weights appear stable. RD to continue to monitor weights and trends as available/able.     Nutritionally Pertinent MEDICATIONS  (STANDING):  atorvastatin  dextrose 5%.  dextrose 5%.  dextrose 50% Injectable  dextrose 50% Injectable  dextrose 50% Injectable  folic acid  glucagon  Injectable  insulin glargine Injectable (LANTUS)  insulin lispro (ADMELOG) corrective regimen sliding scale  insulin lispro (ADMELOG) corrective regimen sliding scale  insulin lispro Injectable (ADMELOG)  insulin lispro Injectable (ADMELOG)  insulin lispro Injectable (ADMELOG)  methylPREDNISolone  methylPREDNISolone  mexiletine  nystatin    Suspension  pantoprazole    Tablet  polyethylene glycol 3350  senna  trimethoprim  160 mG/sulfamethoxazole 800 mG    Pertinent Labs: 08-30 @ 07:06: Na 137, BUN 18, Cr 0.71, <H>, K+ 4.7, Phos 3.8, Mg 2.0, Alk Phos 67, ALT/SGPT 30, AST/SGOT 75<H>, HbA1c --    A1C with Estimated Average Glucose Result: 9.1 % (06-17-23 @ 10:27)  A1C with Estimated Average Glucose Result: 9.4 % (05-12-23 @ 07:40)  A1C with Estimated Average Glucose Result: 10.3 % (03-05-23 @ 06:35)    Finger Sticks:  POCT Blood Glucose.: 305 mg/dL (08-30 @ 08:44)  POCT Blood Glucose.: 275 mg/dL (08-30 @ 01:56)  POCT Blood Glucose.: 118 mg/dL (08-29 @ 21:12)  POCT Blood Glucose.: 110 mg/dL (08-29 @ 17:34)  POCT Blood Glucose.: 126 mg/dL (08-29 @ 13:39)      Skin per nursing documentation: Previous left heel stage 2 pressure injury documented, healed as of 08-29 (per flow sheets).   Edema: No edema noted (per flow sheets).     Estimated Needs:   [X] no change since previous assessment  [] recalculated: current weight    Previous Nutrition Diagnosis:   Nutrition Diagnosis is: [X] ongoing  [] resolved [] not applicable     New Nutrition Diagnosis: Altered nutrition related labs, related to poor glycemic control, as evidenced by A1C of 9.1%.     Nutrition Care Plan:  [X] In Progress: Being addressed with diet, oral nutrition supplementation, and micronutrient supplementation.    [] Achieved  [] Not applicable    Nutrition Interventions:     Education Provided:       [X] Yes:  [] No: Attempted to provide pt with education materials on T2DM medical nutrition therapy, pt declined and stated she will just "put it in the garbage." RD verbally educated pt on the importance of limiting added sugars, consuming more whole grains vs. refined carbohydrates, and emphasized the importance of pairing carbohydrates with protein for glycemic control. Pt made aware RD to remain available for any questions.        Recommendations:      1) Once medically feasible, continue previous diet as tolerated: Consistent Carbohydrate (with evening snack).  2) Continue Glucerna 3x/day to optimize protein-energy intake.   3) Monitor glucose fingersticks and adherence to nutrition-related recommendations.   4) RD obtained food preferences, will honor as able.  5) Monitor nutritional intake, tolerance to diet prescription, weights, labs, and skin integrity.     Monitoring and Evaluation:   Continue to monitor nutritional intake, tolerance to diet prescription, weights, labs, skin integrity    RD remains available upon request and will follow up per protocol.  Yaneli Frederick RDN   Pager #[phone] Nutrition Follow Up Note  Patient seen for: Consult, A1C 9.1%     Chart reviewed, events noted.  Per chart, "72F hx bronchiectasis, trachomalacia on chronic steroids, DM, pAfib on Eliquis, colorectal ca yrs ago s/p colostomy, recent admission for parainfluenza PNA on various antibiotics see HPI . P/w SOB, found to have bronchial secretions on CT, admit to medicine for exacerbation of bronchiectasis. Sputum culture growing MRSA, completed 7 days linezolid. Anemic, likely hemolytic from TAVR according to heme. Deconditioning, planning for dc to home with rehab options."    Source: [X] Patient       [x] Electronic Medical Record       [] RN        [] Family at bedside       [] Other:    -If unable to interview patient: [] Trach/Vent/BiPAP  [] Disoriented/confused/inappropriate to interview    Diet Order:   Diet, NPO:   NPO for Procedure/Test     NPO Start Date: 30-Aug-2023,   NPO Start Time: 00:00  Except Medications (08-30-23)  Diet, Regular:   Consistent Carbohydrate {Evening Snack} (CSTCHOSN)  Supplement Feeding Modality:  Oral  Glucerna Shake Cans or Servings Per Day:  3       Frequency:  Three Times a day (08-25-23)    - Is current order appropriate/adequate? [] Yes  []  No: See recommendations for details.     - PO intake :   [X] >75%  Adequate    [] 50-75%  Fair       [] <50%  Poor  - Per flow sheets, pt has been eating %.   - Pt stated her appetite has been good in-house, eating ~75% of the trays provided at each meal.   - Pt states she was consuming the Glucerna supplement 3x/day, however has not received it the past 2 days. RD to adjust supplement order.   - Pt states she's been calling down and ordering her meals daily. Pt is fatigued of the menu, but is eating well. RD obtained food preferences, will honor as able.     - Nutrition-related concerns:  - Pt is currently NPO in setting of SAFIA test today (per chart).   - Pt ordered for antibiotics in-house: trimethoprim (per orders).   - Pt with T2DM, on insulin regimen in-house: Insulin Lispro Injectable, Insulin Glargine Injectable, Insulin Lispro (ADMELOG) Corrective Regimen Sliding Scale.   - Pt is ordered for methylprednisolone (per orders), may elevate blood glucose levels.   - A1C 9.1% (06-17) indicating poor glycemic control (per chart).   - Pt with anemia, s/p PRBC transfusion today (08-30).     GI:  Last documented BM 08-27 (per flow sheets).     - Note: Pt with a colostomy (per chart).   - Pt reports no GI disturbances, no changes in bowel movements.   Bowel Regimen? [X] Yes: Senna, Miralax (per orders)  Pt reported no nausea or vomiting.  - Note: Pt ordered for protonix, aluminum hydroxide/magnesium hydroxide/simethicone suspension, and zofran (per orders).     Weights:   Daily Weights: 145.5 pounds (08-30), 143 pounds (08-16) (standing) (per chart)  Dosing Weights: 138.8 pounds (08-11) (per chart)  Weights per [facility] HI: 143.1 pounds (08-11), 143 pounds (07-25), 140 pounds (06-04), 130.15 pounds (05-05), 135 pounds (03-19, 129 pounds (02-03), 129 pounds (01-24)  Current weights appear stable. RD to continue to monitor weights and trends as available/able.     Nutritionally Pertinent MEDICATIONS  (STANDING):  atorvastatin  dextrose 5%.  dextrose 5%.  dextrose 50% Injectable  dextrose 50% Injectable  dextrose 50% Injectable  folic acid  glucagon  Injectable  insulin glargine Injectable (LANTUS)  insulin lispro (ADMELOG) corrective regimen sliding scale  insulin lispro (ADMELOG) corrective regimen sliding scale  insulin lispro Injectable (ADMELOG)  insulin lispro Injectable (ADMELOG)  insulin lispro Injectable (ADMELOG)  methylPREDNISolone  methylPREDNISolone  mexiletine  nystatin    Suspension  pantoprazole    Tablet  polyethylene glycol 3350  senna  trimethoprim  160 mG/sulfamethoxazole 800 mG    Pertinent Labs: 08-30 @ 07:06: Na 137, BUN 18, Cr 0.71, <H>, K+ 4.7, Phos 3.8, Mg 2.0, Alk Phos 67, ALT/SGPT 30, AST/SGOT 75<H>, HbA1c --    A1C with Estimated Average Glucose Result: 9.1 % (06-17-23 @ 10:27)  A1C with Estimated Average Glucose Result: 9.4 % (05-12-23 @ 07:40)  A1C with Estimated Average Glucose Result: 10.3 % (03-05-23 @ 06:35)    Finger Sticks:  POCT Blood Glucose.: 305 mg/dL (08-30 @ 08:44)  POCT Blood Glucose.: 275 mg/dL (08-30 @ 01:56)  POCT Blood Glucose.: 118 mg/dL (08-29 @ 21:12)  POCT Blood Glucose.: 110 mg/dL (08-29 @ 17:34)  POCT Blood Glucose.: 126 mg/dL (08-29 @ 13:39)      Skin per nursing documentation: Previous left heel stage 2 pressure injury documented, healed as of 08-29 (per flow sheets).   Edema: No edema noted (per flow sheets).     Estimated Needs:   [X] no change since previous assessment  [] recalculated: current weight    Previous Nutrition Diagnosis: Moderate Chronic Malnutrition, Increased Nutrient Needs  Nutrition Diagnosis is: [X] ongoing  [] resolved [] not applicable     New Nutrition Diagnosis: Altered nutrition related labs, related to suboptimal glycemic control, as evidenced by A1C of 9.1%.     Nutrition Care Plan:  [X] In Progress: Being addressed with diet, oral nutrition supplementation, and micronutrient supplementation.    [] Achieved  [] Not applicable    Nutrition Interventions:     Education Provided:       [X] Yes:  [] No: Attempted to provide pt with education materials on T2DM medical nutrition therapy, pt declined and stated she will just "put it in the garbage." RD verbally educated pt on the importance of limiting added sugars, consuming more whole grains vs. refined carbohydrates, and emphasized the importance of pairing carbohydrates with protein for glycemic control. Pt made aware RD to remain available for any questions.        Recommendations:      1) Once medically feasible, continue previous diet as tolerated: Consistent Carbohydrate (with evening snack).  2) Continue Glucerna 3x/day to optimize protein-energy intake.   3) Monitor glucose fingersticks and adherence to nutrition-related recommendations.   4) RD obtained food preferences, will honor as able.  5) Monitor nutritional intake, tolerance to diet prescription, weights, labs, and skin integrity.     Monitoring and Evaluation:   Continue to monitor nutritional intake, tolerance to diet prescription, weights, labs, skin integrity    RD remains available upon request and will follow up per protocol.  Yaneli Frederick RDN   Pager #[phone] Nutrition Follow Up Note  Patient seen for: Consult, A1C 9.1%     Chart reviewed, events noted.  Per chart, "72F hx bronchiectasis, trachomalacia on chronic steroids, DM, pAfib on Eliquis, colorectal ca yrs ago s/p colostomy, recent admission for parainfluenza PNA on various antibiotics see HPI . P/w SOB, found to have bronchial secretions on CT, admit to medicine for exacerbation of bronchiectasis. Sputum culture growing MRSA, completed 7 days linezolid. Anemic, likely hemolytic from TAVR according to heme. Deconditioning, planning for dc to home with rehab options."    Source: [X] Patient       [x] Electronic Medical Record       [] RN        [] Family at bedside       [] Other:    -If unable to interview patient: [] Trach/Vent/BiPAP  [] Disoriented/confused/inappropriate to interview    Diet Order:   Diet, NPO:   NPO for Procedure/Test     NPO Start Date: 30-Aug-2023,   NPO Start Time: 00:00  Except Medications (08-30-23)  Diet, Regular:   Consistent Carbohydrate {Evening Snack} (CSTCHOSN)  Supplement Feeding Modality:  Oral  Glucerna Shake Cans or Servings Per Day:  3       Frequency:  Three Times a day (08-25-23)    - Is current order appropriate/adequate? [] Yes  []  No: See recommendations for details.     - PO intake :   [X] >75%  Adequate    [] 50-75%  Fair       [] <50%  Poor  - Per flow sheets, pt has been eating %.   - Pt stated her appetite has been good in-house, eating ~75% of the trays provided at each meal.   - Pt states she was consuming the Glucerna supplement 3x/day, however has not received it the past 2 days. RD to adjust supplement order.   - Pt states she's been calling down and ordering her meals daily. Pt is fatigued of the menu, but is eating well. RD obtained food preferences, will honor as able.     - Nutrition-related concerns:  - Pt is currently NPO in setting of SAFIA test today (per chart).   - Pt ordered for antibiotics in-house: trimethoprim (per orders).   - Pt with T2DM, on insulin regimen in-house: Insulin Lispro Injectable, Insulin Glargine Injectable, Insulin Lispro (ADMELOG) Corrective Regimen Sliding Scale.   - Pt is ordered for methylprednisolone (per orders), may elevate blood glucose levels.   - A1C 9.1% (06-17) indicating poor glycemic control (per chart).   - Pt with anemia, s/p PRBC transfusion today (08-30).     GI:  Last documented BM 08-27 (per flow sheets).     - Note: Pt with a colostomy (per chart).   - Pt reports no GI disturbances, no changes in bowel movements.   Bowel Regimen? [X] Yes: Senna, Miralax (per orders)  Pt reported no nausea or vomiting.  - Note: Pt ordered for protonix, aluminum hydroxide/magnesium hydroxide/simethicone suspension, and zofran (per orders).     Weights:   Daily Weights: 145.5 pounds (08-30), 143 pounds (08-16) (standing) (per chart)  Dosing Weights: 138.8 pounds (08-11) (per chart)  Weights per [facility] HI: 143.1 pounds (08-11), 143 pounds (07-25), 140 pounds (06-04), 130.15 pounds (05-05), 135 pounds (03-19, 129 pounds (02-03), 129 pounds (01-24)  Current weights appear stable. RD to continue to monitor weights and trends as available/able.     Nutritionally Pertinent MEDICATIONS  (STANDING):  atorvastatin  dextrose 5%.  dextrose 5%.  dextrose 50% Injectable  dextrose 50% Injectable  dextrose 50% Injectable  folic acid  glucagon  Injectable  insulin glargine Injectable (LANTUS)  insulin lispro (ADMELOG) corrective regimen sliding scale  insulin lispro (ADMELOG) corrective regimen sliding scale  insulin lispro Injectable (ADMELOG)  insulin lispro Injectable (ADMELOG)  insulin lispro Injectable (ADMELOG)  methylPREDNISolone  methylPREDNISolone  mexiletine  nystatin    Suspension  pantoprazole    Tablet  polyethylene glycol 3350  senna  trimethoprim  160 mG/sulfamethoxazole 800 mG    Pertinent Labs: 08-30 @ 07:06: Na 137, BUN 18, Cr 0.71, <H>, K+ 4.7, Phos 3.8, Mg 2.0, Alk Phos 67, ALT/SGPT 30, AST/SGOT 75<H>, HbA1c --    A1C with Estimated Average Glucose Result: 9.1 % (06-17-23 @ 10:27)  A1C with Estimated Average Glucose Result: 9.4 % (05-12-23 @ 07:40)  A1C with Estimated Average Glucose Result: 10.3 % (03-05-23 @ 06:35)    Finger Sticks:  POCT Blood Glucose.: 305 mg/dL (08-30 @ 08:44)  POCT Blood Glucose.: 275 mg/dL (08-30 @ 01:56)  POCT Blood Glucose.: 118 mg/dL (08-29 @ 21:12)  POCT Blood Glucose.: 110 mg/dL (08-29 @ 17:34)  POCT Blood Glucose.: 126 mg/dL (08-29 @ 13:39)      Skin per nursing documentation: Previous left heel stage 2 pressure injury documented, healed as of 08-29 (per flow sheets).   Edema: No edema noted (per flow sheets).     Estimated Needs:   [X] no change since previous assessment  [] recalculated: current weight    Previous Nutrition Diagnosis: Moderate Chronic Malnutrition, Increased Nutrient Needs  Nutrition Diagnosis is: [X] ongoing  [] resolved [] not applicable     New Nutrition Diagnosis: Altered nutrition related labs, related to suboptimal glycemic control, as evidenced by A1C of 9.1%.     Nutrition Care Plan:  [X] In Progress: Being addressed with diet, oral nutrition supplementation, and micronutrient supplementation.    [] Achieved  [] Not applicable    Nutrition Interventions:     Education Provided:       [X] Yes:  [] No: Attempted to provide pt with education materials on T2DM medical nutrition therapy, pt declined and stated she will just "put it in the garbage." RD verbally educated pt on the importance of limiting added sugars, consuming more whole grains vs. refined carbohydrates, and emphasized the importance of pairing carbohydrates with protein for glycemic control. Pt made aware RD to remain available for any questions.        Recommendations:      1) Once medically feasible, continue previous diet as tolerated: Consistent Carbohydrate (with evening snack).  2) Continue Glucerna 3x/day to optimize protein-energy intake.   3) Add a multivitamin daily for micronutrient coverage unless contraindicated.   4) Monitor glucose fingersticks and adherence to nutrition-related recommendations.   5) RD obtained food preferences, will honor as able.  6) Monitor nutritional intake, tolerance to diet prescription, weights, labs, and skin integrity.     Monitoring and Evaluation:   Continue to monitor nutritional intake, tolerance to diet prescription, weights, labs, skin integrity    RD remains available upon request and will follow up per protocol.  Yaneli Frederick RDN   Pager #[phone]

## 2023-08-30 NOTE — PROGRESS NOTE ADULT - ATTENDING COMMENTS
72F  with history of bronchiectasis on chronic steroids,   s/p surgery, allergic rhinitis,  recurrent PNA, PND, tracheomalacia s/p tracheoplasty, ESBL proteus infections (sputum), T2DM, paroxysmal A-fib on Eliquis, colorectal cancer many years ago status post colostomy presenting with shortness of breath of 3 day duration. Prior issues with ESBL/Proteus/yeast in sputum culture and was treated with ertapenem/Benadryl/vancomycin/aztreonam and methylprednisolone. She was discharged home with a midline and completed a course of antibiotics ertapenem (last dose 06/25). She was also recently  treated for MRSA and pseudomonas in sputum and completed Tobra nebs and doxycycline.    Acute bronchiectasis- stable- on steroid taper. No hypoxic respiratory failure at this  time  Hemolytic anemia secondary to TAVR- for PRBC today  Aortic stenosis in setting of less than one year old AVR and chronic aortic Type B dissection. After d/w cards and structural cards will do SAFIA today for further eval prior to considering an additional procedure.  T2DM with hyperglycemia- goal BS less than 200- adjust insulin

## 2023-08-30 NOTE — PROGRESS NOTE ADULT - SUBJECTIVE AND OBJECTIVE BOX
[facility] DIVISION OF INFECTIOUS DISEASES  GISSELL Uriostegui Y. Patel, S. Shah, G. Terrell  [phone]  ([phone] - weekdays after 5pm and weekends)    Name: RAYRAY RODRIGUEZ  Age/Gender: 72y Female  MRN: [id number]    Interval History:  Patient seen and examined this morning.   No new complaints noted.  Notes reviewed.   No concerning overnight events  Afebrile.    Allergies: tetanus toxoid (Short breath)  cefepime (Anaphylaxis)  penicillin (Anaphylaxis)  Avelox (Short breath; Pruritus)  Dilaudid (Short breath)  codeine (Short breath)  aspirin (Short breath)  iodine (Short breath; Swelling)  Valium (Short breath)  shellfish (Anaphylaxis)      Objective:  Vitals:   T(F): 97.9 (08-30-23 @ 06:16), Max: 99 (08-29-23 @ 20:34)  HR: 91 (08-30-23 @ 06:16) (78 - 96)  BP: 91/57 (08-30-23 @ 06:16) (91/57 - 106/65)  RR: 18 (08-30-23 @ 06:16) (18 - 18)  SpO2: 96% (08-30-23 @ 06:16) (96% - 98%)  Physical Examination:  General: no acute distress, RA  HEENT: NC/AT, anicteric, neck supple  Respiratory: no acc muscle use, breathing comfortably  Cardiovascular: S1 and S2 present  Gastrointestinal: normal appearing, nondistended  Extremities: no edema, no cyanosis  Skin: no visible rash    Laboratory Studies:  CBC:                       7.2    13.55 )-----------( 187      ( 30 Aug 2023 07:04 )             24.3     WBC Trend:  13.55 08-30-23 @ 07:04  19.19 08-29-23 @ 07:38  21.03 08-28-23 @ 10:44  15.97 08-27-23 @ 11:23  13.95 08-26-23 @ 07:19  12.96 08-25-23 @ 11:38  11.97 08-24-23 @ 12:04    CMP: 08-30    137  |  101  |  18  ----------------------------<  319<H>  4.7   |  25  |  0.71    Ca    8.8      30 Aug 2023 07:06  Phos  3.8     08-30  Mg     2.0     08-30    TPro  5.4<L>  /  Alb  3.3  /  TBili  0.9  /  DBili  0.2  /  AST  75<H>  /  ALT  30  /  AlkPhos  67  08-30    Creatinine: 0.71 mg/dL (08-30-23 @ 07:06)  Creatinine: 0.81 mg/dL (08-29-23 @ 07:34)  Creatinine: 0.79 mg/dL (08-28-23 @ 10:44)  Creatinine: 0.65 mg/dL (08-27-23 @ 11:23)  Creatinine: 0.67 mg/dL (08-26-23 @ 07:20)  Creatinine: 0.64 mg/dL (08-25-23 @ 11:38)  Creatinine: 0.73 mg/dL (08-24-23 @ 12:04)    LIVER FUNCTIONS - ( 30 Aug 2023 07:06 )  Alb: 3.3 g/dL / Pro: 5.4 g/dL / ALK PHOS: 67 U/L / ALT: 30 U/L / AST: 75 U/L / GGT: x           Microbiology: reviewed   Culture - Sputum (collected 08-26-23 @ 17:57)  Source: .Sputum Sputum  Gram Stain (08-27-23 @ 06:39):    Moderate polymorphonuclear leukocytes per low power field    No Squamous epithelial cells per low power field    Moderate Gram positive cocci in pairs seen per oil power field  Final Report (08-28-23 @ 22:06):    Normal Respiratory Jessica present    Culture - Sputum, Cystic Fibrosis (collected 08-18-23 @ 23:27)  Source: .Sputum Sputum  Gram Stain (08-19-23 @ 03:39):    No polymorphonuclear leukocytes per low power field    No Squamous epithelial cells per low power field    No organisms seen per oil power field  Final Report (08-22-23 @ 15:32):    Rare Methicillin Resistant Staphylococcus aureus    Rare Normal Respiratory Jessica present  Organism: Methicillin resistant Staphylococcus aureus (08-22-23 @ 15:32)  Organism: Methicillin resistant Staphylococcus aureus (08-22-23 @ 15:32)      Method Type: GARRETT      -  Ampicillin/Sulbactam: R <=8/4      -  Cefazolin: R >16      -  Clindamycin: S <=0.25      -  Erythromycin: R >4      -  Gentamicin: S <=1 Should not be used as monotherapy      -  Linezolid: S 2      -  Oxacillin: R >2      -  Penicillin: R >8      -  Rifampin: S <=1 Should not be used as monotherapy      -  Tetracycline: S <=1      -  Trimethoprim/Sulfamethoxazole: R >2/38      -  Vancomycin: S 2    Culture - Sputum (collected 08-18-23 @ 10:43)  Source: .Sputum Sputum  Gram Stain (08-18-23 @ 20:40):    No polymorphonuclear leukocytes per low power field    Rare Squamous epithelial cells per low power field    Moderate Gram positive cocci in pairs per oil power field    Few Gram Negative Rods per oil power field  Final Report (08-20-23 @ 19:06):    Normal Respiratory Jessica present    Culture - Blood (collected 08-17-23 @ 09:35)  Source: .Blood Blood-Peripheral  Final Report (08-22-23 @ 18:00):    No growth at 5 days    Culture - Blood (collected 08-17-23 @ 09:30)  Source: .Blood Blood-Peripheral  Final Report (08-22-23 @ 18:00):    No growth at 5 days    Culture - Sputum, Cystic Fibrosis (collected 08-17-23 @ 01:18)  Source: .Sputum Sputum  Gram Stain (08-17-23 @ 10:15):    Rare Squamous epithelial cells per low power field    No polymorphonuclear leukocytes per low power field    Rare Gram positive cocci in pairs per oil power field  Final Report (08-21-23 @ 09:41):    Moderate Methicillin Resistant Staphylococcus aureus    Rare Pantoea agglomerans    Numerous Normal Respiratory Jessica present  Organism: Methicillin resistant Staphylococcus aureus  Pantoea agglomerans (Previousley Enterobacter) (08-21-23 @ 09:41)  Organism: Pantoea agglomerans (Previousley Enterobacter) (08-21-23 @ 09:41)      Method Type: GARRETT      -  Amikacin: S <=16      -  Amoxicillin/Clavulanic Acid: S <=8/4      -  Ampicillin: S <=8 These ampicillin results predict results for amoxicillin      -  Ampicillin/Sulbactam: S <=4/2 Enterobacter, Klebsiella aerogenes, Citrobacter, and Serratia may develop resistance during prolonged therapy (3-4 days)      -  Aztreonam: S <=4      -  Cefazolin: S <=2 Enterobacter, Klebsiella aerogenes, Citrobacter, and Serratia may develop resistance during prolonged therapy (3-4 days)      -  Cefepime: S <=2      -  Cefoxitin: S <=8      -  Ceftriaxone: S <=1 Enterobacter, Klebsiella aerogenes, Citrobacter, and Serratia may develop resistance during prolonged therapy      -  Ciprofloxacin: S <=0.25      -  Ertapenem: S <=0.5      -  Gentamicin: S <=2      -  Levofloxacin: S <=0.5      -  Meropenem: S <=1      -  Piperacillin/Tazobactam: S <=8      -  Tobramycin: S <=2      -  Trimethoprim/Sulfamethoxazole: S <=0.5/9.5  Organism: Methicillin resistant Staphylococcus aureus (08-21-23 @ 09:41)      Method Type: GARRETT      -  Ampicillin/Sulbactam: R <=8/4      -  Cefazolin: R >16      -  Clindamycin: S <=0.25      -  Erythromycin: R >4      -  Gentamicin: S <=1 Should not be used as monotherapy      -  Linezolid: S 2      -  Oxacillin: R >2      -  Penicillin: R >8      -  Rifampin: S <=1 Should not be used as monotherapy      -  Tetracycline: S 2      -  Trimethoprim/Sulfamethoxazole: R >2/38      -  Vancomycin: S 2    SARS-CoV-2 Result: Neva (11 Aug 2023 16:06)    Radiology: reviewed     Medications:  acetaminophen     Tablet .. 650 milliGRAM(s) Oral every 6 hours PRN  acetaminophen   IVPB .. 1000 milliGRAM(s) IV Intermittent once  albuterol/ipratropium for Nebulization 3 milliLiter(s) Nebulizer every 6 hours  aluminum hydroxide/magnesium hydroxide/simethicone Suspension 30 milliLiter(s) Oral every 4 hours PRN  apixaban 5 milliGRAM(s) Oral every 12 hours  atorvastatin 20 milliGRAM(s) Oral at bedtime  buDESOnide    Inhalation Suspension 0.5 milliGRAM(s) Inhalation two times a day  chlorhexidine 4% Liquid 1 Application(s) Topical daily  clotrimazole 1% Cream 1 Application(s) Topical two times a day  dextrose 5%. 1000 milliLiter(s) IV Continuous <Continuous>  dextrose 5%. 1000 milliLiter(s) IV Continuous <Continuous>  dextrose 50% Injectable 25 Gram(s) IV Push once  dextrose 50% Injectable 12.5 Gram(s) IV Push once  dextrose 50% Injectable 25 Gram(s) IV Push once  dextrose Oral Gel 15 Gram(s) Oral once PRN  diphenhydrAMINE 25 milliGRAM(s) Oral daily PRN  folic acid 1 milliGRAM(s) Oral daily  glucagon  Injectable 1 milliGRAM(s) IntraMuscular once  insulin glargine Injectable (LANTUS) 25 Unit(s) SubCutaneous at bedtime  insulin lispro (ADMELOG) corrective regimen sliding scale   SubCutaneous <User Schedule>  insulin lispro (ADMELOG) corrective regimen sliding scale   SubCutaneous three times a day before meals  insulin lispro Injectable (ADMELOG) 18 Unit(s) SubCutaneous before dinner  insulin lispro Injectable (ADMELOG) 12 Unit(s) SubCutaneous before breakfast  insulin lispro Injectable (ADMELOG) 16 Unit(s) SubCutaneous before lunch  melatonin 3 milliGRAM(s) Oral at bedtime PRN  methylPREDNISolone   Oral   methylPREDNISolone 16 milliGRAM(s) Oral daily  mexiletine 200 milliGRAM(s) Oral every 8 hours  nystatin    Suspension 794492 Unit(s) Swish and Swallow two times a day  ondansetron Injectable 4 milliGRAM(s) IV Push every 8 hours PRN  pantoprazole    Tablet 40 milliGRAM(s) Oral before breakfast  polyethylene glycol 3350 17 Gram(s) Oral two times a day  senna 2 Tablet(s) Oral at bedtime  sodium chloride 7% Inhalation 4 milliLiter(s) Inhalation every 6 hours  tiotropium 2.5 MICROgram(s) Inhaler 2 Puff(s) Inhalation daily  trimethoprim  160 mG/sulfamethoxazole 800 mG 1 Tablet(s) Oral daily    Current Antimicrobials:  nystatin    Suspension 031918 Unit(s) Swish and Swallow two times a day  trimethoprim  160 mG/sulfamethoxazole 800 mG 1 Tablet(s) Oral daily    Prior/Completed Antimicrobials:  ertapenem  IVPB  ertapenem  IVPB  linezolid    Tablet

## 2023-08-30 NOTE — PROGRESS NOTE ADULT - NUTRITIONAL ASSESSMENT
This patient has been assessed with a concern for Malnutrition and has been determined to have a diagnosis/diagnoses of Moderate protein-calorie malnutrition.    This patient is being managed with:   Diet NPO-  NPO for Procedure/Test     NPO Start Date: 30-Aug-2023   NPO Start Time: 00:00  Except Medications  Entered: Aug 30 2023  7:49AM    Diet Regular-  Consistent Carbohydrate {Evening Snack} (CSTCHOSN)  Supplement Feeding Modality:  Oral  Glucerna Shake Cans or Servings Per Day:  3       Frequency:  Three Times a day  Entered: Aug 25 2023  2:56PM

## 2023-08-30 NOTE — PROGRESS NOTE ADULT - SUBJECTIVE AND OBJECTIVE BOX
*****Structural Heart Team*****    Subjective:  The patient is resting comfortably in bed, stating she feels a little better since she has received a unit of blood for her Anemia.       PAST MEDICAL & SURGICAL HISTORY:  Atrial fibrillation  paroxysmal, on eliquis    Diabetes  Type 2    COPD (chronic obstructive pulmonary disease)    Adrenal insufficiency  Medrol daily for over 50 years    Aortic insufficiency  moderate AR on echo 5/3/2018    Pelvic fracture    Asthma    Tracheobronchomalacia  diagnosed 2015, s/p bronchial thermoplasty 2016 (Dr Zapien); recent bronchoscopy 6/5/2018 revealed no evidence of tracheobronchomalacia in trachea or bronchial tubes    Colorectal cancer  4/2018- last treatment , chemo and radiation    Rectal bleeding    Seizure  x 1 1/7/18    DVT (deep venous thrombosis)  15-20 years ago, took coumadin    TIA (transient ischemic attack)  multiple, last 5 years ago - presents as right-sided weakness    History of partial hysterectomy  30 years ago - fibroids    H/O total knee replacement, bilateral  5 years ago    History of sinus surgery  multiple sinus surgeries    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 by Dr Zapien    S/P bronchoscopy  6/5/2018 - [place] (Dr Zapien) no evidence of tracheobronchomalacia in trachea or bronchial tubes    Rectal bleeding  exam under anesthesia ([facility]) 2/2018          T(C): 36.6 (08-30-23 @ 06:16), Max: 37.2 (08-29-23 @ 20:34)  HR: 91 (08-30-23 @ 06:16) (78 - 96)  BP: 91/57 (08-30-23 @ 06:16) (91/57 - 106/65)  RR: 18 (08-30-23 @ 06:16) (18 - 18)  SpO2: 96% (08-30-23 @ 06:16) (96% - 98%)  Wt(kg): --  08-29 @ 07:01  -  08-30 @ 07:00  --------------------------------------------------------  IN: 240 mL / OUT: 0 mL / NET: 240 mL      MEDICATIONS  (STANDING):  acetaminophen   IVPB .. 1000 milliGRAM(s) IV Intermittent once  albuterol/ipratropium for Nebulization 3 milliLiter(s) Nebulizer every 6 hours  apixaban 5 milliGRAM(s) Oral every 12 hours  atorvastatin 20 milliGRAM(s) Oral at bedtime  buDESOnide    Inhalation Suspension 0.5 milliGRAM(s) Inhalation two times a day  chlorhexidine 4% Liquid 1 Application(s) Topical daily  clotrimazole 1% Cream 1 Application(s) Topical two times a day  folic acid 1 milliGRAM(s) Oral daily  glucagon  Injectable 1 milliGRAM(s) IntraMuscular once  insulin glargine Injectable (LANTUS) 25 Unit(s) SubCutaneous at bedtime  insulin lispro (ADMELOG) corrective regimen sliding scale   SubCutaneous <User Schedule>  insulin lispro (ADMELOG) corrective regimen sliding scale   SubCutaneous three times a day before meals  insulin lispro Injectable (ADMELOG) 18 Unit(s) SubCutaneous before dinner  insulin lispro Injectable (ADMELOG) 12 Unit(s) SubCutaneous before breakfast  insulin lispro Injectable (ADMELOG) 16 Unit(s) SubCutaneous before lunch  methylPREDNISolone   Oral   methylPREDNISolone 16 milliGRAM(s) Oral daily  mexiletine 200 milliGRAM(s) Oral every 8 hours  nystatin    Suspension [id number] Unit(s) Swish and Swallow two times a day  pantoprazole    Tablet 40 milliGRAM(s) Oral before breakfast  polyethylene glycol 3350 17 Gram(s) Oral two times a day  senna 2 Tablet(s) Oral at bedtime  sodium chloride 7% Inhalation 4 milliLiter(s) Inhalation every 6 hours  tiotropium 2.5 MICROgram(s) Inhaler 2 Puff(s) Inhalation daily  trimethoprim  160 mG/sulfamethoxazole 800 mG 1 Tablet(s) Oral daily    MEDICATIONS  (PRN):  acetaminophen     Tablet .. 650 milliGRAM(s) Oral every 6 hours PRN Temp greater or equal to 38C (100.4F), Mild Pain (1 - 3)  aluminum hydroxide/magnesium hydroxide/simethicone Suspension 30 milliLiter(s) Oral every 4 hours PRN Dyspepsia  dextrose Oral Gel 15 Gram(s) Oral once PRN Blood Glucose LESS THAN 70 milliGRAM(s)/deciliter  diphenhydrAMINE 25 milliGRAM(s) Oral daily PRN Allergy symptoms  melatonin 3 milliGRAM(s) Oral at bedtime PRN Insomnia  ondansetron Injectable 4 milliGRAM(s) IV Push every 8 hours PRN Nausea and/or Vomiting      Review of Symptoms:  Constitutional: Awake, Alert, Follows commands  Respiratory: + SOB on Admission, h/o bronchiectasis  Cardiac: Denies CP, Denies Palpitations  Gastrointestinal: Denies Pain, Denies N/V, tolerating po intake  Vascular: Negative  Extremities: + Edema, No joint pain or swelling  Neurological: Negative  Endocrine: No heat or cold intolerance, No excessive thirst  Heme/Onc: + Anemia    Exam:  General: A/Ox3, KEN, NAD  HEENT: Supple, No JVD, Trachea midline, no masses, L Prosthetic eye  Pulmonary: Course B/L, = Chest Excursion, no accessory muscle use  Cor: S1S2, irregular, I/VI BESSY  ECG: AFib  Gastrointestinal: Soft, NT/ND, + Bowel Sounds  Neuro: = motor and sensory B/L, No focal deficits  Vascular: 1+ Pulses   Extremities: No joint pain or swelling  Skin: Warm/Dry/Normal color, Normal turgor, no rashes                          7.2    13.55 )-----------( 187      ( 30 Aug 2023 07:04 )             24.3   08-30    137  |  101  |  18  ----------------------------<  319<H>  4.7   |  25  |  0.71    Ca    8.8      30 Aug 2023 07:06  Phos  3.8     08-30  Mg     2.0     08-30    TPro  5.4<L>  /  Alb  3.3  /  TBili  0.9  /  DBili  0.2  /  AST  75<H>  /  ALT  30  /  AlkPhos  67  08-30  PT/INR - ( 29 Aug 2023 07:36 )   PT: 11.0 sec;   INR: 1.00 ratio         PTT - ( 29 Aug 2023 07:36 )  PTT:26.0 sec    Imaging Reviewed:    Echocardiogram:    Cardiac Catheterization:        Assesment/Plan:  72 female with ? Prosthetic Aortic Valve Stenosis, Bronchiectasis, Anemia    1.) Aortic Stenosis: Patient is S/P AVR/Bental last year, now presents with elevated gradients. Patient needs SAFIA to better elucidate whether there is True prosthetic Valve Stenosis vs leaflet thrombosis. LV is also hyperdynamic. SAFIA cancelled yesterday due to her pulmonary status, however she states her breathing is much better now and back to baseline. Discussed with Dr. Milner, and her team trying to get SAFIA done today.  2.) Bronchiectasis: Patient with a known long history, and ID feels she was admitted with an exacerbation of same. She was laying flat on examination today. Continue with current management.  3.) Anemia: F/U CBC after PRBC transfusion. Continue to monitor  4.) PT as tolerated  KIANA Mena  [id number]

## 2023-08-30 NOTE — PROGRESS NOTE ADULT - ASSESSMENT
71 yo PMHx  asthma on chronic steroids, bronchiectasis s/p surgery, allergic rhinitis,  recurrent PNA, PND, tracheomalacia s/p tracheoplasty, ESBL proteus infections (sputum),  diabetes, paroxysmal A-fib on Eliquis, colorectal cancer many years ago status post colostomy presenting with concern for shortness of breath. Prior issues with ESBL/Proteus/yeast in sputum culture and was treated with ertapenem/Benadryl/vancomycin/aztreonam and methylprednisolone.   She was discharged home with a midline and completed a course of antibiotics ertapenem (last dose 06/25) PT now readmitted with severe dyspnea and reports that having had asthma for 61 years she feels like she needs an antibiotic. Reports not tolerating meropenem but has tolerated ertapenem. Patient follows with Dr Allison who knows this complicated pt very well. Noted pt has PCN, cefepime allergies    Suspect likely Bronchiectasis exacerbation  Leukocytosis- suspect likely reactive in setting of steroids   -Repeat CXR with no pneumonia  -Fungitell negative   -8/15 Scx with normal resp val   -8/14 Sputum AFB smear negative -f/u culture to rule out DIEUDONNE - NGTD  -8/17 CF Scx with mod MRSA, Pantoea (previously Enterobacter) and normal resp val  -8/26 repeat Scx with normal resp val -- ok with taking off isolation, defer to IC  - WBC improved, remains afebrile, feels better overall     S/p linezolid day (8/18-8/24)  S/p ertapenem (8/12-8/18)    Pulmonary following - changed to po steroid 8/19 with taper  Follow AFB, fungal sputum cultures (NGTD)  Continue off antibiotics other than bactrim for PJP ppx  CTS following for bioprosthetic AV with stenosis, now planning for SAFIA today  Supportive care, chest PT, neb treatments   Trend temps/WBC      D/w Dr. Festus Fernandez M.D.  [facility], Division of Infectious Diseases  [phone]  After 5pm on weekdays and all day on weekends - please call [phone]

## 2023-08-30 NOTE — PROGRESS NOTE ADULT - ASSESSMENT
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 P/w SOB, found to have bronchial secretions on CT, admit to medicine for exacerbation of bronchiectasis. Sputum culture growing MRSA, completed 7 days linezolid. Anemic, likely hemolytic from TAVR according to heme. SAFIA today cancelled due to cardiology team concerns for patient stability. Deconditioning, planning for dc to home with rehab options.. 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 P/w SOB, found to have bronchial secretions on CT, admit to medicine for exacerbation of bronchiectasis. Sputum culture growing MRSA, completed 7 days linezolid. Anemic, likely hemolytic from TAVR according to heme. SAFIA to evaluate the aortic stenosis planned for today.

## 2023-08-30 NOTE — CHART NOTE - NSCHARTNOTEFT_GEN_A_CORE
72 F w/h/o uncontrolled T2DM (A1C 9.4%) on basal/bolus insulin PTA. Unknown DM complications. Also COPD, secondary adrenal Insufficiency on chronic steroids, colorectal cancer s/p resection (colostomy bag), Chronic A fib on Eliquis, and tracheomalacia and multiple intubations, type A aortic dissection s/p aortic dissection repair on 9/07/22, sepsis. Pt well known to this provider from prior admissions. Here with SOB> treated for PNA and on Prednisolone taper> started 16mg today with hyperglycemia even though pt was NPO for procedure. Will increase Lantus dose and re evaluate for further insulin needs once pt is eating again.  BG Goal 100-180mg/dl.     POCT Blood Glucose.: 234 mg/dL (08-30-23 @ 13:00)  POCT Blood Glucose.: 305 mg/dL (08-30-23 @ 08:44)  POCT Blood Glucose.: 275 mg/dL (08-30-23 @ 01:56)  POCT Blood Glucose.: 118 mg/dL (08-29-23 @ 21:12)  POCT Blood Glucose.: 110 mg/dL (08-29-23 @ 17:34)  POCT Blood Glucose.: 126 mg/dL (08-29-23 @ 13:39)  POCT Blood Glucose.: 122 mg/dL (08-29-23 @ 08:38)  POCT Blood Glucose.: 190 mg/dL (08-29-23 @ 01:46)  POCT Blood Glucose.: 242 mg/dL (08-28-23 @ 21:20)      MEDICATIONS:  atorvastatin 20 milliGRAM(s) Oral at bedtime  insulin glargine Injectable (LANTUS) 25 Unit(s) SubCutaneous at bedtime  insulin lispro (ADMELOG) corrective regimen sliding scale   SubCutaneous <User Schedule>  insulin lispro (ADMELOG) corrective regimen sliding scale   SubCutaneous three times a day before meals  insulin lispro Injectable (ADMELOG) 18 Unit(s) SubCutaneous before dinner  insulin lispro Injectable (ADMELOG) 12 Unit(s) SubCutaneous before breakfast  insulin lispro Injectable (ADMELOG) 16 Unit(s) SubCutaneous before lunch  methylPREDNISolone 16 milliGRAM(s) Oral daily  trimethoprim  160 mG/sulfamethoxazole 800 mG 1 Tablet(s) Oral daily    PLAN:   Test BG ac and hs and 2am while on steroids  Adjust Lantus dose back to 30 units qhs for now  C/w Admelog dose to 12-16-18 units qac starting tomorrow sinc eopt was NPO today.  Hold if NPO or eating less than 50% of meals.  C/w Mod correction scale qac + bedtime+ check 2 am BG  Please contact endo team with any changes on steroid doses ( noted Methylprednisolone dose decrease to 16 mg on8/30 and down to 8mg on 9/6)  Will f/u.

## 2023-08-30 NOTE — PROGRESS NOTE ADULT - PROBLEM SELECTOR PLAN 1
- CT chest showed increased bronchial secretions, on RA saturating well, speaking in full sentences, unclear trigger, likely due to infection, or inflammation.     - sputum culture grew MRSA, s/p 7 day course of ertapenem, followed by another 7 days course of linezolid   - hypertonic saline 7% and albuterol neb q6h standing, followed by use of airway clearance device (patient brought her own from home)  - chest PT as tolerated   - budesonide 0.5mg neb q12h standing   - now on solumedrol taper - 24mg, decrease by 8mg every week to 8 mg  - continue with bactrim for PCP ppx   - pulm following, appreciate recs  - ID following, persistent leukocytosis likely in relation to steroids  - throacic echo: hyperdynamic LV, likely normal RV function. Acute Exacerbation of Bronchiectasis - Stable     - Initial CT showed increased bronchial secretions and wheezing managed with 7% saline and Q6 albuterol Neb  - MRSA + Sputum s/p treatment with Ertapenem -> Linezolid  - Off treatment dose antibiotics with just Bactrim Ppx  - Currently on solumedrol taper - 24mg, decrease by 8mg/wk to 8mg  - Clinically improved

## 2023-08-30 NOTE — PROGRESS NOTE ADULT - PROBLEM SELECTOR PLAN 2
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  If so, she will need a Gated Cardiac CT to look at valvular anatomy, as well as access points.  - SAFIA cancelled due to cardiac team concerns with patient stability regarding leukocytosis, infection risk, and low hemoglobin - want to perform procedures outpatient after patient more stable. Anemia  - Anemia workup c/f hemolysis, heme and CTS consulted  - Heme suggests hemolytic 2/2 AV stenosis from TAVR or natural valve based on smears and darby neg.  - CTS consulted, not a candidate for open surgical repair  - SAFIA 8/30 per structural cardiology. 1u PRBC given beforehand. If stenotic valve - gated cardiac CT to assess valvular anatomy for TAVR on TAVR procedure outpt.

## 2023-08-30 NOTE — PROGRESS NOTE ADULT - SUBJECTIVE AND OBJECTIVE BOX
RAYRAY RODRIGUEZ  72y  MRN: [id number]    Patient is a 72y old  Female who presents with a chief complaint of sob (30 Aug 2023 11:28)      Interval/Overnight Events: no events ON.   - plan for SAFIA today, made NPO  -     Subjective: Pt seen and examined at bedside. Denies fever, CP, SOB, abn pain, N/V, dysuria. Tolerating diet.      MEDICATIONS  (STANDING):  acetaminophen   IVPB .. 1000 milliGRAM(s) IV Intermittent once  albuterol/ipratropium for Nebulization 3 milliLiter(s) Nebulizer every 6 hours  apixaban 5 milliGRAM(s) Oral every 12 hours  atorvastatin 20 milliGRAM(s) Oral at bedtime  buDESOnide    Inhalation Suspension 0.5 milliGRAM(s) Inhalation two times a day  chlorhexidine 4% Liquid 1 Application(s) Topical daily  clotrimazole 1% Cream 1 Application(s) Topical two times a day  dextrose 5%. 1000 milliLiter(s) (100 mL/Hr) IV Continuous <Continuous>  dextrose 5%. 1000 milliLiter(s) (50 mL/Hr) IV Continuous <Continuous>  dextrose 50% Injectable 25 Gram(s) IV Push once  dextrose 50% Injectable 12.5 Gram(s) IV Push once  dextrose 50% Injectable 25 Gram(s) IV Push once  folic acid 1 milliGRAM(s) Oral daily  glucagon  Injectable 1 milliGRAM(s) IntraMuscular once  insulin glargine Injectable (LANTUS) 25 Unit(s) SubCutaneous at bedtime  insulin lispro (ADMELOG) corrective regimen sliding scale   SubCutaneous <User Schedule>  insulin lispro (ADMELOG) corrective regimen sliding scale   SubCutaneous three times a day before meals  insulin lispro Injectable (ADMELOG) 18 Unit(s) SubCutaneous before dinner  insulin lispro Injectable (ADMELOG) 12 Unit(s) SubCutaneous before breakfast  insulin lispro Injectable (ADMELOG) 16 Unit(s) SubCutaneous before lunch  methylPREDNISolone   Oral   methylPREDNISolone 16 milliGRAM(s) Oral daily  mexiletine 200 milliGRAM(s) Oral every 8 hours  nystatin    Suspension 602026 Unit(s) Swish and Swallow two times a day  pantoprazole    Tablet 40 milliGRAM(s) Oral before breakfast  polyethylene glycol 3350 17 Gram(s) Oral two times a day  senna 2 Tablet(s) Oral at bedtime  sodium chloride 7% Inhalation 4 milliLiter(s) Inhalation every 6 hours  tiotropium 2.5 MICROgram(s) Inhaler 2 Puff(s) Inhalation daily  trimethoprim  160 mG/sulfamethoxazole 800 mG 1 Tablet(s) Oral daily    MEDICATIONS  (PRN):  acetaminophen     Tablet .. 650 milliGRAM(s) Oral every 6 hours PRN Temp greater or equal to 38C (100.4F), Mild Pain (1 - 3)  aluminum hydroxide/magnesium hydroxide/simethicone Suspension 30 milliLiter(s) Oral every 4 hours PRN Dyspepsia  dextrose Oral Gel 15 Gram(s) Oral once PRN Blood Glucose LESS THAN 70 milliGRAM(s)/deciliter  diphenhydrAMINE 25 milliGRAM(s) Oral daily PRN Allergy symptoms  melatonin 3 milliGRAM(s) Oral at bedtime PRN Insomnia  ondansetron Injectable 4 milliGRAM(s) IV Push every 8 hours PRN Nausea and/or Vomiting      Objective:    Vitals: Vital Signs Last 24 Hrs  T(C): 36.6 (08-30-23 @ 06:16), Max: 37.2 (08-29-23 @ 20:34)  T(F): 97.9 (08-30-23 @ 06:16), Max: 99 (08-29-23 @ 20:34)  HR: 91 (08-30-23 @ 06:16) (91 - 96)  BP: 91/57 (08-30-23 @ 06:16) (91/57 - 106/65)  BP(mean): --  RR: 18 (08-30-23 @ 06:16) (18 - 18)  SpO2: 96% (08-30-23 @ 06:16) (96% - 98%)                I&O's Summary    29 Aug 2023 07:01  -  30 Aug 2023 07:00  --------------------------------------------------------  IN: 240 mL / OUT: 0 mL / NET: 240 mL        PHYSICAL EXAM:  GENERAL: NAD  HEAD:  Atraumatic, Normocephalic  EYES: EOMI, conjunctiva and sclera clear  CHEST/LUNG: Clear to auscultation bilaterally; No rales, rhonchi, wheezing, or rubs  HEART: Regular rate and rhythm; No murmurs, rubs, or gallops  ABDOMEN: Soft, Nontender, Nondistended;   SKIN: No rashes or lesions  NERVOUS SYSTEM:  Alert & Oriented X3, no focal deficits    LABS:                        7.2    13.55 )-----------( 187      ( 30 Aug 2023 07:04 )             24.3                         7.5    19.19 )-----------( 194      ( 29 Aug 2023 07:38 )             25.8                         8.4    21.03 )-----------( 188      ( 28 Aug 2023 10:44 )             28.4     08-30    137  |  101  |  18  ----------------------------<  319<H>  4.7   |  25  |  0.71  08-29    144  |  104  |  20  ----------------------------<  138<H>  4.3   |  25  |  0.81  08-28    141  |  104  |  16  ----------------------------<  133<H>  4.4   |  25  |  0.79    Ca    8.8      30 Aug 2023 07:06  Ca    9.2      29 Aug 2023 07:34  Ca    8.9      28 Aug 2023 10:44  Phos  3.8     08-30  Mg     2.0     08-30    TPro  5.4<L>  /  Alb  3.3  /  TBili  0.9  /  DBili  0.2  /  AST  75<H>  /  ALT  30  /  AlkPhos  67  08-30  TPro  5.6<L>  /  Alb  3.6  /  TBili  0.9  /  DBili  x   /  AST  73<H>  /  ALT  30  /  AlkPhos  64  08-29  TPro  5.7<L>  /  Alb  3.6  /  TBili  0.7  /  DBili  x   /  AST  58<H>  /  ALT  28  /  AlkPhos  69  08-28    CAPILLARY BLOOD GLUCOSE      POCT Blood Glucose.: 234 mg/dL (30 Aug 2023 13:00)  POCT Blood Glucose.: 305 mg/dL (30 Aug 2023 08:44)  POCT Blood Glucose.: 275 mg/dL (30 Aug 2023 01:56)  POCT Blood Glucose.: 118 mg/dL (29 Aug 2023 21:12)  POCT Blood Glucose.: 110 mg/dL (29 Aug 2023 17:34)    PT/INR - ( 29 Aug 2023 07:36 )   PT: 11.0 sec;   INR: 1.00 ratio         PTT - ( 29 Aug 2023 07:36 )  PTT:26.0 sec    Urinalysis Basic - ( 30 Aug 2023 07:06 )    Color: x / Appearance: x / SG: x / pH: x  Gluc: 319 mg/dL / Ketone: x  / Bili: x / Urobili: x   Blood: x / Protein: x / Nitrite: x   Leuk Esterase: x / RBC: x / WBC x   Sq Epi: x / Non Sq Epi: x / Bacteria: x          RADIOLOGY & ADDITIONAL TESTS:    Imaging Personally Reviewed:  [x ] YES  [ ] NO    Consultants involved in case:   Consultant(s) Notes Reviewed:  [ x] YES  [ ] NO:   Care Discussed with Consultants/Other Providers [x ] YES  [ ] NO         RAYRAY RODRIGUEZ  72y  MRN: [id number]    Patient is a 72y old  Female who presents with a chief complaint of sob (30 Aug 2023 11:28)      Interval/Overnight Events: no events ON.   - plan for SAFIA today, made NPO  -       Subjective: Pt seen and examined at bedside. Denies fever, CP, SOB, abn pain, N/V, dysuria. Tolerating diet.      MEDICATIONS  (STANDING):  acetaminophen   IVPB .. 1000 milliGRAM(s) IV Intermittent once  albuterol/ipratropium for Nebulization 3 milliLiter(s) Nebulizer every 6 hours  apixaban 5 milliGRAM(s) Oral every 12 hours  atorvastatin 20 milliGRAM(s) Oral at bedtime  buDESOnide    Inhalation Suspension 0.5 milliGRAM(s) Inhalation two times a day  chlorhexidine 4% Liquid 1 Application(s) Topical daily  clotrimazole 1% Cream 1 Application(s) Topical two times a day  dextrose 5%. 1000 milliLiter(s) (100 mL/Hr) IV Continuous <Continuous>  dextrose 5%. 1000 milliLiter(s) (50 mL/Hr) IV Continuous <Continuous>  dextrose 50% Injectable 25 Gram(s) IV Push once  dextrose 50% Injectable 12.5 Gram(s) IV Push once  dextrose 50% Injectable 25 Gram(s) IV Push once  folic acid 1 milliGRAM(s) Oral daily  glucagon  Injectable 1 milliGRAM(s) IntraMuscular once  insulin glargine Injectable (LANTUS) 25 Unit(s) SubCutaneous at bedtime  insulin lispro (ADMELOG) corrective regimen sliding scale   SubCutaneous <User Schedule>  insulin lispro (ADMELOG) corrective regimen sliding scale   SubCutaneous three times a day before meals  insulin lispro Injectable (ADMELOG) 18 Unit(s) SubCutaneous before dinner  insulin lispro Injectable (ADMELOG) 12 Unit(s) SubCutaneous before breakfast  insulin lispro Injectable (ADMELOG) 16 Unit(s) SubCutaneous before lunch  methylPREDNISolone   Oral   methylPREDNISolone 16 milliGRAM(s) Oral daily  mexiletine 200 milliGRAM(s) Oral every 8 hours  nystatin    Suspension 987225 Unit(s) Swish and Swallow two times a day  pantoprazole    Tablet 40 milliGRAM(s) Oral before breakfast  polyethylene glycol 3350 17 Gram(s) Oral two times a day  senna 2 Tablet(s) Oral at bedtime  sodium chloride 7% Inhalation 4 milliLiter(s) Inhalation every 6 hours  tiotropium 2.5 MICROgram(s) Inhaler 2 Puff(s) Inhalation daily  trimethoprim  160 mG/sulfamethoxazole 800 mG 1 Tablet(s) Oral daily    MEDICATIONS  (PRN):  acetaminophen     Tablet .. 650 milliGRAM(s) Oral every 6 hours PRN Temp greater or equal to 38C (100.4F), Mild Pain (1 - 3)  aluminum hydroxide/magnesium hydroxide/simethicone Suspension 30 milliLiter(s) Oral every 4 hours PRN Dyspepsia  dextrose Oral Gel 15 Gram(s) Oral once PRN Blood Glucose LESS THAN 70 milliGRAM(s)/deciliter  diphenhydrAMINE 25 milliGRAM(s) Oral daily PRN Allergy symptoms  melatonin 3 milliGRAM(s) Oral at bedtime PRN Insomnia  ondansetron Injectable 4 milliGRAM(s) IV Push every 8 hours PRN Nausea and/or Vomiting      Objective:    Vitals: Vital Signs Last 24 Hrs  T(C): 36.6 (08-30-23 @ 06:16), Max: 37.2 (08-29-23 @ 20:34)  T(F): 97.9 (08-30-23 @ 06:16), Max: 99 (08-29-23 @ 20:34)  HR: 91 (08-30-23 @ 06:16) (91 - 96)  BP: 91/57 (08-30-23 @ 06:16) (91/57 - 106/65)  BP(mean): --  RR: 18 (08-30-23 @ 06:16) (18 - 18)  SpO2: 96% (08-30-23 @ 06:16) (96% - 98%)                I&O's Summary    29 Aug 2023 07:01  -  30 Aug 2023 07:00  --------------------------------------------------------  IN: 240 mL / OUT: 0 mL / NET: 240 mL        PHYSICAL EXAM:  GENERAL: NAD  HEAD:  Atraumatic, Normocephalic  EYES: EOMI, conjunctiva and sclera clear  CHEST/LUNG: Clear to auscultation bilaterally; No rales, rhonchi, wheezing, or rubs  HEART: Regular rate and rhythm; No murmurs, rubs, or gallops  ABDOMEN: Soft, Nontender, Nondistended;   SKIN: No rashes or lesions  NERVOUS SYSTEM:  Alert & Oriented X3, no focal deficits    LABS:                        7.2    13.55 )-----------( 187      ( 30 Aug 2023 07:04 )             24.3                         7.5    19.19 )-----------( 194      ( 29 Aug 2023 07:38 )             25.8                         8.4    21.03 )-----------( 188      ( 28 Aug 2023 10:44 )             28.4     08-30    137  |  101  |  18  ----------------------------<  319<H>  4.7   |  25  |  0.71  08-29    144  |  104  |  20  ----------------------------<  138<H>  4.3   |  25  |  0.81  08-28    141  |  104  |  16  ----------------------------<  133<H>  4.4   |  25  |  0.79    Ca    8.8      30 Aug 2023 07:06  Ca    9.2      29 Aug 2023 07:34  Ca    8.9      28 Aug 2023 10:44  Phos  3.8     08-30  Mg     2.0     08-30    TPro  5.4<L>  /  Alb  3.3  /  TBili  0.9  /  DBili  0.2  /  AST  75<H>  /  ALT  30  /  AlkPhos  67  08-30  TPro  5.6<L>  /  Alb  3.6  /  TBili  0.9  /  DBili  x   /  AST  73<H>  /  ALT  30  /  AlkPhos  64  08-29  TPro  5.7<L>  /  Alb  3.6  /  TBili  0.7  /  DBili  x   /  AST  58<H>  /  ALT  28  /  AlkPhos  69  08-28    CAPILLARY BLOOD GLUCOSE      POCT Blood Glucose.: 234 mg/dL (30 Aug 2023 13:00)  POCT Blood Glucose.: 305 mg/dL (30 Aug 2023 08:44)  POCT Blood Glucose.: 275 mg/dL (30 Aug 2023 01:56)  POCT Blood Glucose.: 118 mg/dL (29 Aug 2023 21:12)  POCT Blood Glucose.: 110 mg/dL (29 Aug 2023 17:34)    PT/INR - ( 29 Aug 2023 07:36 )   PT: 11.0 sec;   INR: 1.00 ratio         PTT - ( 29 Aug 2023 07:36 )  PTT:26.0 sec    Urinalysis Basic - ( 30 Aug 2023 07:06 )    Color: x / Appearance: x / SG: x / pH: x  Gluc: 319 mg/dL / Ketone: x  / Bili: x / Urobili: x   Blood: x / Protein: x / Nitrite: x   Leuk Esterase: x / RBC: x / WBC x   Sq Epi: x / Non Sq Epi: x / Bacteria: x          RADIOLOGY & ADDITIONAL TESTS:    Imaging Personally Reviewed:  [x ] YES  [ ] NO    Consultants involved in case:   Consultant(s) Notes Reviewed:  [ x] YES  [ ] NO:   Care Discussed with Consultants/Other Providers [x ] YES  [ ] NO         RAYRAY RODRIGUEZ  72y  MRN: [id number]    Patient is a 72y old  Female who presents with a chief complaint of sob (30 Aug 2023 11:28)      Interval/Overnight Events: no events ON.   - plan for SAFIA today, made NPO    Subjective: Pt seen and examined at bedside. Denies fever, CP, SOB, abn pain, N/V, dysuria. Tolerating diet.      MEDICATIONS  (STANDING):  acetaminophen   IVPB .. 1000 milliGRAM(s) IV Intermittent once  albuterol/ipratropium for Nebulization 3 milliLiter(s) Nebulizer every 6 hours  apixaban 5 milliGRAM(s) Oral every 12 hours  atorvastatin 20 milliGRAM(s) Oral at bedtime  buDESOnide    Inhalation Suspension 0.5 milliGRAM(s) Inhalation two times a day  chlorhexidine 4% Liquid 1 Application(s) Topical daily  clotrimazole 1% Cream 1 Application(s) Topical two times a day  dextrose 5%. 1000 milliLiter(s) (100 mL/Hr) IV Continuous <Continuous>  dextrose 5%. 1000 milliLiter(s) (50 mL/Hr) IV Continuous <Continuous>  dextrose 50% Injectable 25 Gram(s) IV Push once  dextrose 50% Injectable 12.5 Gram(s) IV Push once  dextrose 50% Injectable 25 Gram(s) IV Push once  folic acid 1 milliGRAM(s) Oral daily  glucagon  Injectable 1 milliGRAM(s) IntraMuscular once  insulin glargine Injectable (LANTUS) 25 Unit(s) SubCutaneous at bedtime  insulin lispro (ADMELOG) corrective regimen sliding scale   SubCutaneous <User Schedule>  insulin lispro (ADMELOG) corrective regimen sliding scale   SubCutaneous three times a day before meals  insulin lispro Injectable (ADMELOG) 18 Unit(s) SubCutaneous before dinner  insulin lispro Injectable (ADMELOG) 12 Unit(s) SubCutaneous before breakfast  insulin lispro Injectable (ADMELOG) 16 Unit(s) SubCutaneous before lunch  methylPREDNISolone   Oral   methylPREDNISolone 16 milliGRAM(s) Oral daily  mexiletine 200 milliGRAM(s) Oral every 8 hours  nystatin    Suspension 889119 Unit(s) Swish and Swallow two times a day  pantoprazole    Tablet 40 milliGRAM(s) Oral before breakfast  polyethylene glycol 3350 17 Gram(s) Oral two times a day  senna 2 Tablet(s) Oral at bedtime  sodium chloride 7% Inhalation 4 milliLiter(s) Inhalation every 6 hours  tiotropium 2.5 MICROgram(s) Inhaler 2 Puff(s) Inhalation daily  trimethoprim  160 mG/sulfamethoxazole 800 mG 1 Tablet(s) Oral daily    MEDICATIONS  (PRN):  acetaminophen     Tablet .. 650 milliGRAM(s) Oral every 6 hours PRN Temp greater or equal to 38C (100.4F), Mild Pain (1 - 3)  aluminum hydroxide/magnesium hydroxide/simethicone Suspension 30 milliLiter(s) Oral every 4 hours PRN Dyspepsia  dextrose Oral Gel 15 Gram(s) Oral once PRN Blood Glucose LESS THAN 70 milliGRAM(s)/deciliter  diphenhydrAMINE 25 milliGRAM(s) Oral daily PRN Allergy symptoms  melatonin 3 milliGRAM(s) Oral at bedtime PRN Insomnia  ondansetron Injectable 4 milliGRAM(s) IV Push every 8 hours PRN Nausea and/or Vomiting      Objective:    Vitals: Vital Signs Last 24 Hrs  T(C): 36.6 (08-30-23 @ 06:16), Max: 37.2 (08-29-23 @ 20:34)  T(F): 97.9 (08-30-23 @ 06:16), Max: 99 (08-29-23 @ 20:34)  HR: 91 (08-30-23 @ 06:16) (91 - 96)  BP: 91/57 (08-30-23 @ 06:16) (91/57 - 106/65)  BP(mean): --  RR: 18 (08-30-23 @ 06:16) (18 - 18)  SpO2: 96% (08-30-23 @ 06:16) (96% - 98%)                I&O's Summary    29 Aug 2023 07:01  -  30 Aug 2023 07:00  --------------------------------------------------------  IN: 240 mL / OUT: 0 mL / NET: 240 mL        PHYSICAL EXAM:  GENERAL: NAD  HEAD:  Atraumatic, Normocephalic  EYES: EOMI, conjunctiva and sclera clear  CHEST/LUNG: Clear to auscultation bilaterally; No rales, rhonchi, wheezing, or rubs  HEART: Regular rate and rhythm; No murmurs, rubs, or gallops  ABDOMEN: Soft, Nontender, Nondistended;   SKIN: No rashes or lesions  NERVOUS SYSTEM:  Alert & Oriented X3, no focal deficits    LABS:                        7.2    13.55 )-----------( 187      ( 30 Aug 2023 07:04 )             24.3                         7.5    19.19 )-----------( 194      ( 29 Aug 2023 07:38 )             25.8                         8.4    21.03 )-----------( 188      ( 28 Aug 2023 10:44 )             28.4     08-30    137  |  101  |  18  ----------------------------<  319<H>  4.7   |  25  |  0.71  08-29    144  |  104  |  20  ----------------------------<  138<H>  4.3   |  25  |  0.81  08-28    141  |  104  |  16  ----------------------------<  133<H>  4.4   |  25  |  0.79    Ca    8.8      30 Aug 2023 07:06  Ca    9.2      29 Aug 2023 07:34  Ca    8.9      28 Aug 2023 10:44  Phos  3.8     08-30  Mg     2.0     08-30    TPro  5.4<L>  /  Alb  3.3  /  TBili  0.9  /  DBili  0.2  /  AST  75<H>  /  ALT  30  /  AlkPhos  67  08-30  TPro  5.6<L>  /  Alb  3.6  /  TBili  0.9  /  DBili  x   /  AST  73<H>  /  ALT  30  /  AlkPhos  64  08-29  TPro  5.7<L>  /  Alb  3.6  /  TBili  0.7  /  DBili  x   /  AST  58<H>  /  ALT  28  /  AlkPhos  69  08-28    CAPILLARY BLOOD GLUCOSE      POCT Blood Glucose.: 234 mg/dL (30 Aug 2023 13:00)  POCT Blood Glucose.: 305 mg/dL (30 Aug 2023 08:44)  POCT Blood Glucose.: 275 mg/dL (30 Aug 2023 01:56)  POCT Blood Glucose.: 118 mg/dL (29 Aug 2023 21:12)  POCT Blood Glucose.: 110 mg/dL (29 Aug 2023 17:34)    PT/INR - ( 29 Aug 2023 07:36 )   PT: 11.0 sec;   INR: 1.00 ratio         PTT - ( 29 Aug 2023 07:36 )  PTT:26.0 sec    Urinalysis Basic - ( 30 Aug 2023 07:06 )    Color: x / Appearance: x / SG: x / pH: x  Gluc: 319 mg/dL / Ketone: x  / Bili: x / Urobili: x   Blood: x / Protein: x / Nitrite: x   Leuk Esterase: x / RBC: x / WBC x   Sq Epi: x / Non Sq Epi: x / Bacteria: x          RADIOLOGY & ADDITIONAL TESTS:    Imaging Personally Reviewed:  [x ] YES  [ ] NO    Consultants involved in case:   Consultant(s) Notes Reviewed:  [ x] YES  [ ] NO:   Care Discussed with Consultants/Other Providers [x ] YES  [ ] NO

## 2023-08-31 LAB
ANION GAP SERPL CALC-SCNC: 11 MMOL/L — SIGNIFICANT CHANGE UP (ref 5–17)
BUN SERPL-MCNC: 15 MG/DL — SIGNIFICANT CHANGE UP (ref 7–23)
CALCIUM SERPL-MCNC: 8.3 MG/DL — LOW (ref 8.4–10.5)
CHLORIDE SERPL-SCNC: 107 MMOL/L — SIGNIFICANT CHANGE UP (ref 96–108)
CO2 SERPL-SCNC: 24 MMOL/L — SIGNIFICANT CHANGE UP (ref 22–31)
CREAT SERPL-MCNC: 0.7 MG/DL — SIGNIFICANT CHANGE UP (ref 0.5–1.3)
EGFR: 91 ML/MIN/1.73M2 — SIGNIFICANT CHANGE UP
GLUCOSE BLDC GLUCOMTR-MCNC: 119 MG/DL — HIGH (ref 70–99)
GLUCOSE BLDC GLUCOMTR-MCNC: 183 MG/DL — HIGH (ref 70–99)
GLUCOSE BLDC GLUCOMTR-MCNC: 226 MG/DL — HIGH (ref 70–99)
GLUCOSE BLDC GLUCOMTR-MCNC: 250 MG/DL — HIGH (ref 70–99)
GLUCOSE BLDC GLUCOMTR-MCNC: 99 MG/DL — SIGNIFICANT CHANGE UP (ref 70–99)
GLUCOSE SERPL-MCNC: 179 MG/DL — HIGH (ref 70–99)
HCT VFR BLD CALC: 30.2 % — LOW (ref 34.5–45)
HGB BLD-MCNC: 8.9 G/DL — LOW (ref 11.5–15.5)
MAGNESIUM SERPL-MCNC: 2.3 MG/DL — SIGNIFICANT CHANGE UP (ref 1.6–2.6)
MCHC RBC-ENTMCNC: 24.3 PG — LOW (ref 27–34)
MCHC RBC-ENTMCNC: 29.5 GM/DL — LOW (ref 32–36)
MCV RBC AUTO: 82.5 FL — SIGNIFICANT CHANGE UP (ref 80–100)
NRBC # BLD: 79 /100 WBCS — HIGH (ref 0–0)
PHOSPHATE SERPL-MCNC: 2.7 MG/DL — SIGNIFICANT CHANGE UP (ref 2.5–4.5)
PLATELET # BLD AUTO: 174 K/UL — SIGNIFICANT CHANGE UP (ref 150–400)
POTASSIUM SERPL-MCNC: 4.3 MMOL/L — SIGNIFICANT CHANGE UP (ref 3.5–5.3)
POTASSIUM SERPL-SCNC: 4.3 MMOL/L — SIGNIFICANT CHANGE UP (ref 3.5–5.3)
RBC # BLD: 3.66 M/UL — LOW (ref 3.8–5.2)
RBC # FLD: 34.5 % — HIGH (ref 10.3–14.5)
SODIUM SERPL-SCNC: 142 MMOL/L — SIGNIFICANT CHANGE UP (ref 135–145)
WBC # BLD: 11.01 K/UL — HIGH (ref 3.8–10.5)
WBC # FLD AUTO: 11.01 K/UL — HIGH (ref 3.8–10.5)

## 2023-08-31 PROCEDURE — 99232 SBSQ HOSP IP/OBS MODERATE 35: CPT | Mod: GC

## 2023-08-31 PROCEDURE — 99232 SBSQ HOSP IP/OBS MODERATE 35: CPT

## 2023-08-31 PROCEDURE — 99233 SBSQ HOSP IP/OBS HIGH 50: CPT | Mod: GC

## 2023-08-31 RX ORDER — INSULIN GLARGINE 100 [IU]/ML
32 INJECTION, SOLUTION SUBCUTANEOUS AT BEDTIME
Refills: 0 | Status: DISCONTINUED | OUTPATIENT
Start: 2023-08-31 | End: 2023-09-05

## 2023-08-31 RX ORDER — MULTIVIT-MIN/FERROUS GLUCONATE 9 MG/15 ML
1 LIQUID (ML) ORAL DAILY
Refills: 0 | Status: DISCONTINUED | OUTPATIENT
Start: 2023-08-31 | End: 2023-09-06

## 2023-08-31 RX ORDER — DIPHENHYDRAMINE HCL 50 MG
50 CAPSULE ORAL
Refills: 0 | Status: DISCONTINUED | OUTPATIENT
Start: 2023-08-31 | End: 2023-09-03

## 2023-08-31 RX ORDER — INSULIN LISPRO 100/ML
13 VIAL (ML) SUBCUTANEOUS
Refills: 0 | Status: DISCONTINUED | OUTPATIENT
Start: 2023-09-01 | End: 2023-09-04

## 2023-08-31 RX ADMIN — MEXILETINE HYDROCHLORIDE 200 MILLIGRAM(S): 150 CAPSULE ORAL at 13:15

## 2023-08-31 RX ADMIN — APIXABAN 5 MILLIGRAM(S): 2.5 TABLET, FILM COATED ORAL at 13:14

## 2023-08-31 RX ADMIN — SODIUM CHLORIDE 4 MILLILITER(S): 9 INJECTION INTRAMUSCULAR; INTRAVENOUS; SUBCUTANEOUS at 17:35

## 2023-08-31 RX ADMIN — Medication 3 MILLILITER(S): at 05:44

## 2023-08-31 RX ADMIN — Medication 4: at 13:33

## 2023-08-31 RX ADMIN — POLYETHYLENE GLYCOL 3350 17 GRAM(S): 17 POWDER, FOR SOLUTION ORAL at 17:35

## 2023-08-31 RX ADMIN — SODIUM CHLORIDE 4 MILLILITER(S): 9 INJECTION INTRAMUSCULAR; INTRAVENOUS; SUBCUTANEOUS at 13:14

## 2023-08-31 RX ADMIN — CHLORHEXIDINE GLUCONATE 1 APPLICATION(S): 213 SOLUTION TOPICAL at 17:36

## 2023-08-31 RX ADMIN — Medication 50 MILLIGRAM(S): at 19:44

## 2023-08-31 RX ADMIN — Medication 0.5 MILLIGRAM(S): at 05:44

## 2023-08-31 RX ADMIN — Medication 1 TABLET(S): at 13:16

## 2023-08-31 RX ADMIN — Medication 16 UNIT(S): at 13:33

## 2023-08-31 RX ADMIN — Medication 1 APPLICATION(S): at 05:44

## 2023-08-31 RX ADMIN — MEXILETINE HYDROCHLORIDE 200 MILLIGRAM(S): 150 CAPSULE ORAL at 05:43

## 2023-08-31 RX ADMIN — Medication 2: at 10:39

## 2023-08-31 RX ADMIN — Medication 16 MILLIGRAM(S): at 05:43

## 2023-08-31 RX ADMIN — PANTOPRAZOLE SODIUM 40 MILLIGRAM(S): 20 TABLET, DELAYED RELEASE ORAL at 05:45

## 2023-08-31 RX ADMIN — Medication 1 APPLICATION(S): at 17:36

## 2023-08-31 RX ADMIN — Medication 3 MILLILITER(S): at 17:33

## 2023-08-31 RX ADMIN — Medication 1 MILLIGRAM(S): at 13:15

## 2023-08-31 RX ADMIN — Medication 500000 UNIT(S): at 05:43

## 2023-08-31 RX ADMIN — Medication 1 TABLET(S): at 17:35

## 2023-08-31 RX ADMIN — Medication 18 UNIT(S): at 17:37

## 2023-08-31 RX ADMIN — SENNA PLUS 2 TABLET(S): 8.6 TABLET ORAL at 22:19

## 2023-08-31 RX ADMIN — TIOTROPIUM BROMIDE 2 PUFF(S): 18 CAPSULE ORAL; RESPIRATORY (INHALATION) at 17:33

## 2023-08-31 RX ADMIN — Medication 500000 UNIT(S): at 17:34

## 2023-08-31 RX ADMIN — INSULIN GLARGINE 32 UNIT(S): 100 INJECTION, SOLUTION SUBCUTANEOUS at 22:25

## 2023-08-31 RX ADMIN — MEXILETINE HYDROCHLORIDE 200 MILLIGRAM(S): 150 CAPSULE ORAL at 22:19

## 2023-08-31 RX ADMIN — POLYETHYLENE GLYCOL 3350 17 GRAM(S): 17 POWDER, FOR SOLUTION ORAL at 05:45

## 2023-08-31 RX ADMIN — APIXABAN 5 MILLIGRAM(S): 2.5 TABLET, FILM COATED ORAL at 22:19

## 2023-08-31 RX ADMIN — SODIUM CHLORIDE 4 MILLILITER(S): 9 INJECTION INTRAMUSCULAR; INTRAVENOUS; SUBCUTANEOUS at 05:44

## 2023-08-31 RX ADMIN — Medication 0.5 MILLIGRAM(S): at 17:34

## 2023-08-31 RX ADMIN — ATORVASTATIN CALCIUM 20 MILLIGRAM(S): 80 TABLET, FILM COATED ORAL at 22:20

## 2023-08-31 RX ADMIN — Medication 3 MILLILITER(S): at 13:14

## 2023-08-31 RX ADMIN — Medication 12 UNIT(S): at 10:39

## 2023-08-31 NOTE — PROGRESS NOTE ADULT - ASSESSMENT
72 year old woman PMHx  asthma on chronic steroids, bronchiectasis s/p surgery, allergic rhinitis,  recurrent PNA, PND, tracheomalacia s/p tracheoplasty, ESBL proteus infections (sputum),  diabetes, paroxysmal A-fib on Eliquis, colorectal cancer status post colostomy in remission presenting with concern for shortness of breath found to have acute on chronic bronchiectasis exacerbation c/b worsening anemia. Hematology on board for anemia work up.        #Microcytic Anemia  -Multiple causes  -Iron elevated but ferritin low normal  -Hapto low, LDH elevated, Retic elevated concerning for hemolysis  -Pending Jenae to check for autoimmune cause; if negative can transfuse 1 unit  -Can start Folic acid 1mg daily  -Check B12/Folate  -Smear reviewed many fragmented RBC, nucleated RBCs, hypersegmented neutrophils  -Low suspicion for MM, given FLC ratio 2  -TTE shows severe aortic stenosis  -Hemolysis most likely from aortic stenosis  -transfuse hgb <7      Please page with questions or concerns. Will Follow with you.      Dejuan Erickson M.D.  Hematology/Oncology Fellow PGY6  Pager [phone]  After 5pm, please contact on-call team.   72 year old woman PMHx  asthma on chronic steroids, bronchiectasis s/p surgery, allergic rhinitis,  recurrent PNA, PND, tracheomalacia s/p tracheoplasty, ESBL proteus infections (sputum),  diabetes, paroxysmal A-fib on Eliquis, colorectal cancer status post colostomy in remission presenting with concern for shortness of breath found to have acute on chronic bronchiectasis exacerbation c/b worsening anemia. Hematology on board for anemia work up.    #Microcytic Anemia  Hemolysis most likely from aortic stenosis. Iron elevated but ferritin low normal. Hapto low, LDH elevated, Retic elevated concerning for hemolysis. Negative Jenae r/o autoimmune conditions. Smear reviewed many fragmented RBC, nucleated RBCs c/f hemolysis.Low suspicion for MM, given FLC ratio 2. TTE showed severe aortic stenosis and Valve severely stenotic on SAFIA 8/30/23    Recs:  -c/w Folic acid 1mg daily  -transfuse hgb <7 , supportive care  - Per CTS not a candidate for open surgical repair. Pending cardiac CT to assess anatomy for TAV in JENNIFER procedure.  -follow w/ CTS for AV repair eval    Please page with questions or concerns. Will Follow with you.    Discussed w/ Dr. David Aguayo MD, PhD  Hematology/Oncology Fellow PGY4    After 5pm, please contact on-call team.

## 2023-08-31 NOTE — PROGRESS NOTE ADULT - ATTENDING COMMENTS
72 year old woman PMHx  asthma on chronic steroids, bronchiectasis s/p surgery, allergic rhinitis,  recurrent PNA, PND, tracheomalacia s/p tracheoplasty, ESBL proteus infections (sputum),  diabetes, paroxysmal A-fib on Eliquis, colorectal cancer status post colostomy in remission presenting with concern for shortness of breath found to have acute on chronic bronchiectasis exacerbation c/b worsening anemia. Hematology on board for anemia work up.  -Hapto low, LDH elevated, Retic elevated concerning for hemolysis. Jenae negative  -Smear reviewed many fragmented RBC, nucleated RBCs, hypersegmented neutrophils. Hemolysis is likely secondary to severe aortic stenosis.   -TTE showed severe aortic stenosis and Valve severely stenotic on SAFIA 8/30/23  -c/w Folic acid 1mg daily  -transfuse hgb <7 , supportive care  -Per CTS not a candidate for open surgical repair. Pending cardiac CT to assess anatomy for TAV in JENNIFER procedure.

## 2023-08-31 NOTE — PROGRESS NOTE ADULT - ASSESSMENT
71 yo PMHx  asthma on chronic steroids, bronchiectasis s/p surgery, allergic rhinitis,  recurrent PNA, PND, tracheomalacia s/p tracheoplasty, ESBL proteus infections (sputum),  diabetes, paroxysmal A-fib on Eliquis, colorectal cancer many years ago status post colostomy presenting with concern for shortness of breath. Prior issues with ESBL/Proteus/yeast in sputum culture and was treated with ertapenem/Benadryl/vancomycin/aztreonam and methylprednisolone.   She was discharged home with a midline and completed a course of antibiotics ertapenem (last dose 06/25) PT now readmitted with severe dyspnea and reports that having had asthma for 61 years she feels like she needs an antibiotic. Reports not tolerating meropenem but has tolerated ertapenem. Patient follows with Dr Allison who knows this complicated pt very well. Noted pt has PCN, cefepime allergies    Suspect likely Bronchiectasis exacerbation  Leukocytosis- suspect likely reactive in setting of steroids   -Repeat CXR with no pneumonia  -Fungitell negative   -8/15 Scx with normal resp val   -8/14 Sputum AFB smear negative -f/u culture to rule out DIEUDONNE - NGTD  -8/17 CF Scx with mod MRSA, Pantoea (previously Enterobacter) and normal resp val  -8/26 repeat Scx with normal resp val -- ok with taking off isolation, defer to IC  - WBC improving, remains afebrile, feels better overall     S/p linezolid day (8/18-8/24)  S/p ertapenem (8/12-8/18)    Pulmonary following - changed to po steroid 8/19 with taper  Follow AFB, fungal sputum cultures (NGTD)  Continue off antibiotics other than bactrim for PJP ppx  CTS following for bioprosthetic AV with stenosis, s/p SAFIA   Supportive care, chest PT, neb treatments   Trend temps/WBC      Tello Fernandez M.D.  [facility], Division of Infectious Diseases  [phone]  After 5pm on weekdays and all day on weekends - please call [phone]

## 2023-08-31 NOTE — PROGRESS NOTE ADULT - PROBLEM SELECTOR PLAN 1
Test BG ac and hs and 2am while on steroids  Adjust Lantus dose to 32 units qhs for now  Adjust Admelog dose to 13-16-18 units qac   Hold if NPO or eating less than 50% of meals.  C/w Mod correction scale qac + bedtime+ check 2 am BG  Please contact endo team with any changes on steroid doses ( noted Methylprednisolone dose decrease to 16 mg on8/30 and down to 8mg on 9/6)  Discharge:  Will be determined according to BG/insulin needs/PO intake and steroid therapy at time of discharge.  Plan to d/c on basal bolus. Doses TBD  Outpt. endo follow-up. Dr Saavedra  Outpt. optho, podiatry, micro/cr  Make sure pt has Rx for all DM supplies and insulin/ DM meds. Consider [facility] if going home

## 2023-08-31 NOTE — PROGRESS NOTE ADULT - PROBLEM SELECTOR PLAN 2
Anemia  - Anemia workup c/f hemolysis, heme and CTS consulted  - Heme suggests hemolytic 2/2 AV stenosis from TAVR or natural valve based on smears and darby neg.  - CTS consulted, not a candidate for open surgical repair  - SAFIA 8/30 per structural cardiology. 1u PRBC given beforehand. If stenotic valve - gated cardiac CT to assess valvular anatomy for TAVR on TAVR procedure outpt. Acute Exacerbation of Bronchiectasis - Stable     - Initial CT showed increased bronchial secretions and wheezing managed with 7% saline and Q6 albuterol Neb  - MRSA + Sputum s/p treatment with Ertapenem -> Linezolid  - Off treatment dose antibiotics with just Bactrim Ppx  - Currently on solumedrol taper - 24mg decrease by 8mg/wk to 8mg  - Clinically improved

## 2023-08-31 NOTE — PROGRESS NOTE ADULT - PROBLEM SELECTOR PLAN 1
Acute Exacerbation of Bronchiectasis - Stable     - Initial CT showed increased bronchial secretions and wheezing managed with 7% saline and Q6 albuterol Neb  - MRSA + Sputum s/p treatment with Ertapenem -> Linezolid  - Off treatment dose antibiotics with just Bactrim Ppx  - Currently on solumedrol taper - 24mg, decrease by 8mg/wk to 8mg  - Clinically improved Anemia  - Anemia workup c/f hemolysis, heme and CTS consulted  - Heme suggests hemolytic 2/2 AV stenosis from TAVR or natural valve based on smears and darby neg.  - CTS consulted, not a candidate for open surgical repair  - SAFIA 8/30 per structural cardiology. 1u PRBC given beforehand.  - Valve severely stenotic on SAFIA, Gated cardiac CT to assess valvular anatomy for TAVR on TAVR procedure outpt. - following cards rec. per Dr. Leahy Anemia  - Anemia workup c/f hemolysis, heme and CTS consulted  - Heme suggests hemolytic 2/2 AV stenosis from TAVR or natural valve based on smears and darby neg.  - CTS consulted, not a candidate for open surgical repair  - SAFIA 8/30 per structural cardiology. 1u PRBC given beforehand.  - Valve severely stenotic on SAFIA, Gated cardiac CT to assess anatomy for TAV in [facility] procedure - following cards rec. per Dr. Leahy

## 2023-08-31 NOTE — PROGRESS NOTE ADULT - ATTENDING COMMENTS
72F  with history of bronchiectasis on chronic steroids,   s/p surgery, allergic rhinitis,  recurrent PNA, PND, tracheomalacia s/p tracheoplasty, ESBL proteus infections (sputum), T2DM, paroxysmal A-fib on Eliquis, colorectal cancer many years ago status post colostomy presenting with shortness of breath of 3 day duration. Prior issues with ESBL/Proteus/yeast in sputum culture and was treated with ertapenem/Benadryl/vancomycin/aztreonam and methylprednisolone. She was discharged home with a midline and completed a course of antibiotics ertapenem (last dose 06/25). She was also recently  treated for MRSA and pseudomonas in sputum and completed Tobra nebs and doxycycline.    Acute bronchiectasis- stable- on steroid taper. 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 SAFIA confirmed the severe AS. Appreciate input by structural cards- CTs today to further clarify. Will need replacement likely next week.   T2DM with hyperglycemia- goal BS less than 200- adjust insulin

## 2023-08-31 NOTE — PROGRESS NOTE ADULT - SUBJECTIVE AND OBJECTIVE BOX
CC: Patient is a 72y old  Female who presents with a chief complaint of sob (30 Aug 2023 13:56)      INTERVAL EVENTS: ERIKA    SUBJECTIVE / INTERVAL HPI: Patient seen and examined at bedside.     ROS: negative unless otherwise stated above.    VITAL SIGNS:  Vital Signs Last 24 Hrs  T(C): 36.9 (31 Aug 2023 05:20), Max: 36.9 (31 Aug 2023 05:20)  T(F): 98.5 (31 Aug 2023 05:20), Max: 98.5 (31 Aug 2023 05:20)  HR: 73 (31 Aug 2023 05:20) (72 - 81)  BP: 115/61 (31 Aug 2023 05:20) (106/69 - 150/72)  BP(mean): --  RR: 18 (31 Aug 2023 05:20) (15 - 18)  SpO2: 100% (31 Aug 2023 05:20) (95% - 100%)    Parameters below as of 31 Aug 2023 05:20  Patient On (Oxygen Delivery Method): room air          08-30-23 @ 07:01  -  08-31-23 @ 07:00  --------------------------------------------------------  IN: 100 mL / OUT: 0 mL / NET: 100 mL        PHYSICAL EXAM:    General: NAD  HEENT: MMM  Cardiovascular: +S1/S2; RRR  Respiratory: CTA B/L; no W/R/R  Gastrointestinal: soft, NT/ND  Extremities: WWP; no edema, clubbing or cyanosis  Vascular: 2+ radial, DP/PT pulses B/L  Neurological: AAOx3; no focal deficits  Psych: Mood: Affect: Congruent and Appropriate     MEDICATIONS:  MEDICATIONS  (STANDING):  albuterol/ipratropium for Nebulization 3 milliLiter(s) Nebulizer every 6 hours  apixaban 5 milliGRAM(s) Oral every 12 hours  atorvastatin 20 milliGRAM(s) Oral at bedtime  buDESOnide    Inhalation Suspension 0.5 milliGRAM(s) Inhalation two times a day  chlorhexidine 4% Liquid 1 Application(s) Topical daily  clotrimazole 1% Cream 1 Application(s) Topical two times a day  dextrose 5%. 1000 milliLiter(s) (100 mL/Hr) IV Continuous <Continuous>  dextrose 5%. 1000 milliLiter(s) (50 mL/Hr) IV Continuous <Continuous>  dextrose 50% Injectable 12.5 Gram(s) IV Push once  dextrose 50% Injectable 25 Gram(s) IV Push once  dextrose 50% Injectable 25 Gram(s) IV Push once  folic acid 1 milliGRAM(s) Oral daily  glucagon  Injectable 1 milliGRAM(s) IntraMuscular once  insulin glargine Injectable (LANTUS) 30 Unit(s) SubCutaneous at bedtime  insulin lispro (ADMELOG) corrective regimen sliding scale   SubCutaneous <User Schedule>  insulin lispro (ADMELOG) corrective regimen sliding scale   SubCutaneous three times a day before meals  insulin lispro Injectable (ADMELOG) 12 Unit(s) SubCutaneous before breakfast  insulin lispro Injectable (ADMELOG) 16 Unit(s) SubCutaneous before lunch  insulin lispro Injectable (ADMELOG) 18 Unit(s) SubCutaneous before dinner  methylPREDNISolone   Oral   methylPREDNISolone 16 milliGRAM(s) Oral daily  mexiletine 200 milliGRAM(s) Oral every 8 hours  nystatin    Suspension 079312 Unit(s) Swish and Swallow two times a day  pantoprazole    Tablet 40 milliGRAM(s) Oral before breakfast  polyethylene glycol 3350 17 Gram(s) Oral two times a day  senna 2 Tablet(s) Oral at bedtime  sodium chloride 7% Inhalation 4 milliLiter(s) Inhalation every 6 hours  tiotropium 2.5 MICROgram(s) Inhaler 2 Puff(s) Inhalation daily  trimethoprim  160 mG/sulfamethoxazole 800 mG 1 Tablet(s) Oral daily    MEDICATIONS  (PRN):  acetaminophen     Tablet .. 650 milliGRAM(s) Oral every 6 hours PRN Temp greater or equal to 38C (100.4F), Mild Pain (1 - 3)  aluminum hydroxide/magnesium hydroxide/simethicone Suspension 30 milliLiter(s) Oral every 4 hours PRN Dyspepsia  dextrose Oral Gel 15 Gram(s) Oral once PRN Blood Glucose LESS THAN 70 milliGRAM(s)/deciliter  melatonin 3 milliGRAM(s) Oral at bedtime PRN Insomnia  ondansetron Injectable 4 milliGRAM(s) IV Push every 8 hours PRN Nausea and/or Vomiting      ALLERGIES:  Allergies    tetanus toxoid (Short breath)  cefepime (Anaphylaxis)  penicillin (Anaphylaxis)  Avelox (Short breath; Pruritus)  Dilaudid (Short breath)  codeine (Short breath)  aspirin (Short breath)  iodine (Short breath; Swelling)  Valium (Short breath)  shellfish (Anaphylaxis)    Intolerances        LABS:                        9.5    14.45 )-----------( 172      ( 30 Aug 2023 14:27 )             32.0     08-30    137  |  101  |  18  ----------------------------<  319<H>  4.7   |  25  |  0.71    Ca    8.8      30 Aug 2023 07:06  Phos  3.8     08-30  Mg     2.0     08-30    TPro  5.4<L>  /  Alb  3.3  /  TBili  0.9  /  DBili  0.2  /  AST  75<H>  /  ALT  30  /  AlkPhos  67  08-30    PT/INR - ( 29 Aug 2023 07:36 )   PT: 11.0 sec;   INR: 1.00 ratio         PTT - ( 29 Aug 2023 07:36 )  PTT:26.0 sec  Urinalysis Basic - ( 30 Aug 2023 07:06 )    Color: x / Appearance: x / SG: x / pH: x  Gluc: 319 mg/dL / Ketone: x  / Bili: x / Urobili: x   Blood: x / Protein: x / Nitrite: x   Leuk Esterase: x / RBC: x / WBC x   Sq Epi: x / Non Sq Epi: x / Bacteria: x      CAPILLARY BLOOD GLUCOSE      POCT Blood Glucose.: 226 mg/dL (31 Aug 2023 02:12)      RADIOLOGY & ADDITIONAL TESTS: Reviewed. CC: Patient is a 72y old  Female who presents with a chief complaint of sob (30 Aug 2023 13:56)      INTERVAL EVENTS: ERIKA    SUBJECTIVE / INTERVAL HPI: Patient seen and examined at bedside. Denies Fever, chills, nausea or diarrhea. endorses cough.     ROS: negative unless otherwise stated above.    VITAL SIGNS:  Vital Signs Last 24 Hrs  T(C): 36.9 (31 Aug 2023 05:20), Max: 36.9 (31 Aug 2023 05:20)  T(F): 98.5 (31 Aug 2023 05:20), Max: 98.5 (31 Aug 2023 05:20)  HR: 73 (31 Aug 2023 05:20) (72 - 81)  BP: 115/61 (31 Aug 2023 05:20) (106/69 - 150/72)  BP(mean): --  RR: 18 (31 Aug 2023 05:20) (15 - 18)  SpO2: 100% (31 Aug 2023 05:20) (95% - 100%)    Parameters below as of 31 Aug 2023 05:20  Patient On (Oxygen Delivery Method): room air          08-30-23 @ 07:01  -  08-31-23 @ 07:00  --------------------------------------------------------  IN: 100 mL / OUT: 0 mL / NET: 100 mL        PHYSICAL EXAM:    General: NAD  HEENT: L eye LP, R eye vision appropriate. Arcus senilis OU, EOMI,   Cardiovascular: +S1/S2; RRR  Respiratory: CTA B/L; no W/R/R  Gastrointestinal: soft, NT/ND  Extremities: WWP; no edema, clubbing or cyanosis  Vascular: 2+ radial  Neurological: AAOx3; no focal deficits  Psych: Mood: "I'm doing as okay as I can be" Affect: Congruent and Appropriate     MEDICATIONS:  MEDICATIONS  (STANDING):  albuterol/ipratropium for Nebulization 3 milliLiter(s) Nebulizer every 6 hours  apixaban 5 milliGRAM(s) Oral every 12 hours  atorvastatin 20 milliGRAM(s) Oral at bedtime  buDESOnide    Inhalation Suspension 0.5 milliGRAM(s) Inhalation two times a day  chlorhexidine 4% Liquid 1 Application(s) Topical daily  clotrimazole 1% Cream 1 Application(s) Topical two times a day  dextrose 5%. 1000 milliLiter(s) (100 mL/Hr) IV Continuous <Continuous>  dextrose 5%. 1000 milliLiter(s) (50 mL/Hr) IV Continuous <Continuous>  dextrose 50% Injectable 12.5 Gram(s) IV Push once  dextrose 50% Injectable 25 Gram(s) IV Push once  dextrose 50% Injectable 25 Gram(s) IV Push once  folic acid 1 milliGRAM(s) Oral daily  glucagon  Injectable 1 milliGRAM(s) IntraMuscular once  insulin glargine Injectable (LANTUS) 30 Unit(s) SubCutaneous at bedtime  insulin lispro (ADMELOG) corrective regimen sliding scale   SubCutaneous <User Schedule>  insulin lispro (ADMELOG) corrective regimen sliding scale   SubCutaneous three times a day before meals  insulin lispro Injectable (ADMELOG) 12 Unit(s) SubCutaneous before breakfast  insulin lispro Injectable (ADMELOG) 16 Unit(s) SubCutaneous before lunch  insulin lispro Injectable (ADMELOG) 18 Unit(s) SubCutaneous before dinner  methylPREDNISolone   Oral   methylPREDNISolone 16 milliGRAM(s) Oral daily  mexiletine 200 milliGRAM(s) Oral every 8 hours  nystatin    Suspension [id number] Unit(s) Swish and Swallow two times a day  pantoprazole    Tablet 40 milliGRAM(s) Oral before breakfast  polyethylene glycol 3350 17 Gram(s) Oral two times a day  senna 2 Tablet(s) Oral at bedtime  sodium chloride 7% Inhalation 4 milliLiter(s) Inhalation every 6 hours  tiotropium 2.5 MICROgram(s) Inhaler 2 Puff(s) Inhalation daily  trimethoprim  160 mG/sulfamethoxazole 800 mG 1 Tablet(s) Oral daily    MEDICATIONS  (PRN):  acetaminophen     Tablet .. 650 milliGRAM(s) Oral every 6 hours PRN Temp greater or equal to 38C (100.4F), Mild Pain (1 - 3)  aluminum hydroxide/magnesium hydroxide/simethicone Suspension 30 milliLiter(s) Oral every 4 hours PRN Dyspepsia  dextrose Oral Gel 15 Gram(s) Oral once PRN Blood Glucose LESS THAN 70 milliGRAM(s)/deciliter  melatonin 3 milliGRAM(s) Oral at bedtime PRN Insomnia  ondansetron Injectable 4 milliGRAM(s) IV Push every 8 hours PRN Nausea and/or Vomiting      ALLERGIES:  Allergies    tetanus toxoid (Short breath)  cefepime (Anaphylaxis)  penicillin (Anaphylaxis)  Avelox (Short breath; Pruritus)  Dilaudid (Short breath)  codeine (Short breath)  aspirin (Short breath)  iodine (Short breath; Swelling)  Valium (Short breath)  shellfish (Anaphylaxis)    Intolerances        LABS:                        9.5    14.45 )-----------( 172      ( 30 Aug 2023 14:27 )             32.0     08-30    137  |  101  |  18  ----------------------------<  319<H>  4.7   |  25  |  0.71    Ca    8.8      30 Aug 2023 07:06  Phos  3.8     08-30  Mg     2.0     08-30    TPro  5.4<L>  /  Alb  3.3  /  TBili  0.9  /  DBili  0.2  /  AST  75<H>  /  ALT  30  /  AlkPhos  67  08-30    PT/INR - ( 29 Aug 2023 07:36 )   PT: 11.0 sec;   INR: 1.00 ratio         PTT - ( 29 Aug 2023 07:36 )  PTT:26.0 sec  Urinalysis Basic - ( 30 Aug 2023 07:06 )    Color: x / Appearance: x / SG: x / pH: x  Gluc: 319 mg/dL / Ketone: x  / Bili: x / Urobili: x   Blood: x / Protein: x / Nitrite: x   Leuk Esterase: x / RBC: x / WBC x   Sq Epi: x / Non Sq Epi: x / Bacteria: x      CAPILLARY BLOOD GLUCOSE      POCT Blood Glucose.: 226 mg/dL (31 Aug 2023 02:12)      RADIOLOGY & ADDITIONAL TESTS: Reviewed.

## 2023-08-31 NOTE — PROGRESS NOTE ADULT - SUBJECTIVE AND OBJECTIVE BOX
HPI/Interval Hx    The patient sitting OOB-Chair, stating she feels a little better since she has received a unit of blood for her Anemia. Structural evaluation for Tristen TAVR in progress.       MEDICATIONS  (STANDING):  albuterol/ipratropium for Nebulization 3 milliLiter(s) Nebulizer every 6 hours  apixaban 5 milliGRAM(s) Oral every 12 hours  atorvastatin 20 milliGRAM(s) Oral at bedtime  buDESOnide    Inhalation Suspension 0.5 milliGRAM(s) Inhalation two times a day  chlorhexidine 4% Liquid 1 Application(s) Topical daily  clotrimazole 1% Cream 1 Application(s) Topical two times a day  dextrose 5%. 1000 milliLiter(s) (50 mL/Hr) IV Continuous <Continuous>  dextrose 5%. 1000 milliLiter(s) (100 mL/Hr) IV Continuous <Continuous>  dextrose 50% Injectable 25 Gram(s) IV Push once  dextrose 50% Injectable 12.5 Gram(s) IV Push once  dextrose 50% Injectable 25 Gram(s) IV Push once  folic acid 1 milliGRAM(s) Oral daily  glucagon  Injectable 1 milliGRAM(s) IntraMuscular once  insulin glargine Injectable (LANTUS) 32 Unit(s) SubCutaneous at bedtime  insulin lispro (ADMELOG) corrective regimen sliding scale   SubCutaneous three times a day before meals  insulin lispro (ADMELOG) corrective regimen sliding scale   SubCutaneous <User Schedule>  insulin lispro Injectable (ADMELOG) 16 Unit(s) SubCutaneous before lunch  insulin lispro Injectable (ADMELOG) 18 Unit(s) SubCutaneous before dinner  methylPREDNISolone   Oral   methylPREDNISolone 16 milliGRAM(s) Oral daily  mexiletine 200 milliGRAM(s) Oral every 8 hours  multivitamin/minerals 1 Tablet(s) Oral daily  nystatin    Suspension 614956 Unit(s) Swish and Swallow two times a day  pantoprazole    Tablet 40 milliGRAM(s) Oral before breakfast  polyethylene glycol 3350 17 Gram(s) Oral two times a day  senna 2 Tablet(s) Oral at bedtime  sodium chloride 7% Inhalation 4 milliLiter(s) Inhalation every 6 hours  tiotropium 2.5 MICROgram(s) Inhaler 2 Puff(s) Inhalation daily  trimethoprim  160 mG/sulfamethoxazole 800 mG 1 Tablet(s) Oral daily    MEDICATIONS  (PRN):  acetaminophen     Tablet .. 650 milliGRAM(s) Oral every 6 hours PRN Temp greater or equal to 38C (100.4F), Mild Pain (1 - 3)  aluminum hydroxide/magnesium hydroxide/simethicone Suspension 30 milliLiter(s) Oral every 4 hours PRN Dyspepsia  dextrose Oral Gel 15 Gram(s) Oral once PRN Blood Glucose LESS THAN 70 milliGRAM(s)/deciliter  melatonin 3 milliGRAM(s) Oral at bedtime PRN Insomnia  ondansetron Injectable 4 milliGRAM(s) IV Push every 8 hours PRN Nausea and/or Vomiting      PAST MEDICAL & SURGICAL HISTORY:  Atrial fibrillation  paroxysmal, on eliquis      Diabetes  Type 2      COPD (chronic obstructive pulmonary disease)      Adrenal insufficiency  Medrol daily for over 50 years      Aortic insufficiency  moderate AR on echo 5/3/2018      Pelvic fracture      Asthma      Tracheobronchomalacia  diagnosed 2015, s/p bronchial thermoplasty 2016 (Dr Zapien); recent bronchoscopy 6/5/2018 revealed no evidence of tracheobronchomalacia in trachea or bronchial tubes      Colorectal cancer  4/2018- last treatment , chemo and radiation      Rectal bleeding      Seizure  x 1 1/7/18      DVT (deep venous thrombosis)  15-20 years ago, took coumadin      TIA (transient ischemic attack)  multiple, last 5 years ago - presents as right-sided weakness      History of partial hysterectomy  30 years ago - fibroids      H/O total knee replacement, bilateral  5 years ago      History of sinus surgery  multiple sinus surgeries      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      H/O pelvic surgery  5 years ago - s/p fracture      History of tracheomalacia  2015 - attempted tracheal stenting ([facility])- course complicated by obstruction, respiratory failure, multiple CPR attempts -  stent discontinued; 10/20/2016 Tracheobronchoplasty (Prolene Mesh) performed at [facility] by Dr Zapien      S/P bronchoscopy  6/5/2018 - [facility] (Dr Zapien) no evidence of tracheobronchomalacia in trachea or bronchial tubes      Rectal bleeding  exam under anesthesia ([facility]) 2/2018          Review of Systems  CONSTITUTIONAL: No weakness, fevers or chills  EYES/ENT: No visual changes;  No vertigo or throat pain   NECK: No pain or stiffness  RESPIRATORY: No cough, wheezing, hemoptysis; No shortness of breath at rest  CARDIOVASCULAR: No chest pain or palpitations, PND, orthopnea, or angina, (+) exertional dyspnea  GASTROINTESTINAL: No abdominal or epigastric pain. No nausea, vomiting, or hematemesis; No diarrhea or constipation. No melena or hematochezia.  GENITOURINARY: No dysuria, frequency or hematuria  NEUROLOGICAL: No numbness or weakness  SKIN: No itching, burning, or rashes, left heal chronic wound  All other review of systems is negative unless indicated above.    Physical Exam  General: A/ox 3, No acute Distress  Neck: Supple, NO JVD  Cardiac: S1 S2, II/VI RUSB murmur  Pulmonary: Diminished bilaterally, coarse rales L>R  Abdomen: Soft, Non -tender, +BS x 4 quads  Extremities: No Rashes, No edema  Neuro: A/o x 3, No focal deficits  Vital Signs Last 24 Hrs  T(C): 36.8 (31 Aug 2023 15:04), Max: 36.9 (31 Aug 2023 05:20)  T(F): 98.2 (31 Aug 2023 15:04), Max: 98.5 (31 Aug 2023 05:20)  HR: 80 (31 Aug 2023 15:04) (72 - 81)  BP: 124/61 (31 Aug 2023 15:04) (115/61 - 150/72)  BP(mean): --  RR: 18 (31 Aug 2023 15:04) (15 - 18)  SpO2: 98% (31 Aug 2023 15:04) (95% - 100%)    Parameters below as of 31 Aug 2023 15:04  Patient On (Oxygen Delivery Method): room air                            8.9    11.01 )-----------( 174      ( 31 Aug 2023 07:30 )             30.2     08-31    142  |  107  |  15  ----------------------------<  179<H>  4.3   |  24  |  0.70    Ca    8.3<L>      31 Aug 2023 07:12  Phos  2.7     08-31  Mg     2.3     08-31    TPro  5.4<L>  /  Alb  3.3  /  TBili  0.9  /  DBili  0.2  /  AST  75<H>  /  ALT  30  /  AlkPhos  67  08-30      Other  < from: SAFIA W or WO Ultrasound Enhancing Agent (08.30.23 @ 16:50) >  FINDINGS:     Left Ventricle:  Left ventricular wall thickness is moderately increased. Left ventricular systolic function is normal with an ejection fraction visually estimated at 65 to 70%.     Right Ventricle:  Normal right ventricular systolic function.     Left Atrium:  There is no evidence of left atrial or left atrial appendage thrombus. The left atrial appendage emptying velocity is normal.     Right Atrium:  The right atrium is moderately dilated in size.     Interatrial Septum:  Agitated saline injection was negative for intracardiac shunt.     Aortic Valve:  A bioprosthetic valve replacement present in the aortic position. There is severe prosthetic aortic stenosis. There is no intravalvular regurgitation. Leaflets are poorly visualized. The peak transaortic velocity is 4.23 m/s, peak transaortic gradient is 71.6 mmHg and mean transaortic gradient is 45.0 mmHg with an LVOT/aortic valve VTI ratio of 0.24. The effectivce orifice area is estimated at 0.68 cm² by the continuity equation.     Mitral Valve:  There is normal leaflet mobility of the mitral valve. There is calcification of the mitral valve annulus. There is mild leaflet calcification. Mitral valve leaflets have focal calcification. There is severe mitral regurgitation. 3 D VCA 0.73 sq cm.     Tricuspid Valve:  Structurally normal tricuspid valve with normal leaflet excursion. There is trace tricuspid regurgitation.     Aorta:  There is a type B aorta dissection. S/p repair of a proximal aorta disssection. Unable to image the aortic arch. Chronic dissection of the desceding thoracic aorta.     Pericardium:  No pericardial effusion seen.  ____________________________________________________________________    < end of copied text >

## 2023-08-31 NOTE — PROGRESS NOTE ADULT - PROBLEM SELECTOR PLAN 4
Diabetes:  - On 25-30 lantus at home, and 7-20 mealtime as well  - Goal less than >200  - Initially had steroid induced hyperglycemia, now on taper   - Endo following and dosing insulin accordingly

## 2023-08-31 NOTE — PROGRESS NOTE ADULT - ASSESSMENT
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 P/w SOB, found to have bronchial secretions on CT, admit to medicine for exacerbation of bronchiectasis. Sputum culture growing MRSA, completed 7 days linezolid. Anemic, likely hemolytic from TAVR according to heme. SAFIA today cancelled due to cardiology team concerns for patient stability. Deconditioning, planning for dc to home with rehab options.. 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 P/w SOB, found to have bronchial secretions on CT, admit to medicine for exacerbation of bronchiectasis. Sputum culture growing MRSA, completed 7 days linezolid and ertapenem. Anemic, likely hemolytic from TAVR according to heme. SAFIA completed 8/30. Gated CT per structural heart and plans for TAVR/MEL procedure per Structural heart team.

## 2023-08-31 NOTE — PROGRESS NOTE ADULT - SUBJECTIVE AND OBJECTIVE BOX
[facility] DISE[facility]ES  GISSELL Uriostegui Y. Patel, S. Shah, G. Terrell  [phone]  ([phone] - weekdays after 5pm and weekends)    Name: RAYRAY RODRIGUEZ  Age/Gender: 72y Female  MRN: [id number]    Interval History:  Patient seen and examined this morning.   No new complaints noted.  Notes reviewed  No concerning overnight events  Afebrile     Allergies: tetanus toxoid (Short breath)  cefepime (Anaphylaxis)  penicillin (Anaphylaxis)  Avelox (Short breath; Pruritus)  Dilaudid (Short breath)  codeine (Short breath)  aspirin (Short breath)  iodine (Short breath; Swelling)  Valium (Short breath)  shellfish (Anaphylaxis)      Objective:  Vitals:   T(F): 98.2 (08-31-23 @ 15:04), Max: 98.5 (08-31-23 @ 05:20)  HR: 80 (08-31-23 @ 15:04) (72 - 81)  BP: 124/61 (08-31-23 @ 15:04) (115/61 - 150/72)  RR: 18 (08-31-23 @ 15:04) (15 - 18)  SpO2: 98% (08-31-23 @ 15:04) (95% - 100%)  Physical Examination:  General: no acute distress  HEENT: NC/AT, anicteric, neck supple  Respiratory: no acc muscle use, breathing comfortably  Cardiovascular: S1 and S2 present  Gastrointestinal: normal appearing, nondistended  Extremities: no edema, no cyanosis  Skin: no visible rash    Laboratory Studies:  CBC:                       8.9    11.01 )-----------( 174      ( 31 Aug 2023 07:30 )             30.2     WBC Trend:  11.01 08-31-23 @ 07:30  14.45 08-30-23 @ 14:27  13.55 08-30-23 @ 07:04  19.19 08-29-23 @ 07:38  21.03 08-28-23 @ 10:44  15.97 08-27-23 @ 11:23  13.95 08-26-23 @ 07:19  12.96 08-25-23 @ 11:38    CMP: 08-31    142  |  107  |  15  ----------------------------<  179<H>  4.3   |  24  |  0.70    Ca    8.3<L>      31 Aug 2023 07:12  Phos  2.7     08-31  Mg     2.3     08-31    TPro  5.4<L>  /  Alb  3.3  /  TBili  0.9  /  DBili  0.2  /  AST  75<H>  /  ALT  30  /  AlkPhos  67  08-30    Creatinine: 0.70 mg/dL (08-31-23 @ 07:12)  Creatinine: 0.71 mg/dL (08-30-23 @ 07:06)  Creatinine: 0.81 mg/dL (08-29-23 @ 07:34)  Creatinine: 0.79 mg/dL (08-28-23 @ 10:44)  Creatinine: 0.65 mg/dL (08-27-23 @ 11:23)  Creatinine: 0.67 mg/dL (08-26-23 @ 07:20)  Creatinine: 0.64 mg/dL (08-25-23 @ 11:38)      LIVER FUNCTIONS - ( 30 Aug 2023 07:06 )  Alb: 3.3 g/dL / Pro: 5.4 g/dL / ALK PHOS: 67 U/L / ALT: 30 U/L / AST: 75 U/L / GGT: x           Microbiology: reviewed   Culture - Sputum (collected 08-26-23 @ 17:57)  Source: .Sputum Sputum  Gram Stain (08-27-23 @ 06:39):    Moderate polymorphonuclear leukocytes per low power field    No Squamous epithelial cells per low power field    Moderate Gram positive cocci in pairs seen per oil power field  Final Report (08-28-23 @ 22:06):    Normal Respiratory Jessica present    Culture - Sputum, Cystic Fibrosis (collected 08-18-23 @ 23:27)  Source: .Sputum Sputum  Gram Stain (08-19-23 @ 03:39):    No polymorphonuclear leukocytes per low power field    No Squamous epithelial cells per low power field    No organisms seen per oil power field  Final Report (08-22-23 @ 15:32):    Rare Methicillin Resistant Staphylococcus aureus    Rare Normal Respiratory Jessica present  Organism: Methicillin resistant Staphylococcus aureus (08-22-23 @ 15:32)  Organism: Methicillin resistant Staphylococcus aureus (08-22-23 @ 15:32)      -  Clindamycin: S <=0.25      -  Oxacillin: R >2      -  Gentamicin: S <=1 Should not be used as monotherapy      -  Linezolid: S 2      -  Cefazolin: R >16      -  Vancomycin: S 2      -  Tetracycline: S <=1      Method Type: GARRETT      -  Ampicillin/Sulbactam: R <=8/4      -  Penicillin: R >8      -  Rifampin: S <=1 Should not be used as monotherapy      -  Erythromycin: R >4      -  Trimethoprim/Sulfamethoxazole: R >2/38    Culture - Sputum (collected 08-18-23 @ 10:43)  Source: .Sputum Sputum  Gram Stain (08-18-23 @ 20:40):    No polymorphonuclear leukocytes per low power field    Rare Squamous epithelial cells per low power field    Moderate Gram positive cocci in pairs per oil power field    Few Gram Negative Rods per oil power field  Final Report (08-20-23 @ 19:06):    Normal Respiratory Jessica present    SARS-CoV-2 Result: NotDetec (11 Aug 2023 16:06)    Radiology: reviewed     Medications:  acetaminophen     Tablet .. 650 milliGRAM(s) Oral every 6 hours PRN  albuterol/ipratropium for Nebulization 3 milliLiter(s) Nebulizer every 6 hours  aluminum hydroxide/magnesium hydroxide/simethicone Suspension 30 milliLiter(s) Oral every 4 hours PRN  apixaban 5 milliGRAM(s) Oral every 12 hours  atorvastatin 20 milliGRAM(s) Oral at bedtime  buDESOnide    Inhalation Suspension 0.5 milliGRAM(s) Inhalation two times a day  chlorhexidine 4% Liquid 1 Application(s) Topical daily  clotrimazole 1% Cream 1 Application(s) Topical two times a day  dextrose 5%. 1000 milliLiter(s) IV Continuous <Continuous>  dextrose 5%. 1000 milliLiter(s) IV Continuous <Continuous>  dextrose 50% Injectable 12.5 Gram(s) IV Push once  dextrose 50% Injectable 25 Gram(s) IV Push once  dextrose 50% Injectable 25 Gram(s) IV Push once  dextrose Oral Gel 15 Gram(s) Oral once PRN  folic acid 1 milliGRAM(s) Oral daily  glucagon  Injectable 1 milliGRAM(s) IntraMuscular once  insulin glargine Injectable (LANTUS) 32 Unit(s) SubCutaneous at bedtime  insulin lispro (ADMELOG) corrective regimen sliding scale   SubCutaneous three times a day before meals  insulin lispro (ADMELOG) corrective regimen sliding scale   SubCutaneous <User Schedule>  insulin lispro Injectable (ADMELOG) 18 Unit(s) SubCutaneous before dinner  insulin lispro Injectable (ADMELOG) 16 Unit(s) SubCutaneous before lunch  melatonin 3 milliGRAM(s) Oral at bedtime PRN  methylPREDNISolone   Oral   methylPREDNISolone 16 milliGRAM(s) Oral daily  mexiletine 200 milliGRAM(s) Oral every 8 hours  multivitamin/minerals 1 Tablet(s) Oral daily  nystatin    Suspension [id number] Unit(s) Swish and Swallow two times a day  ondansetron Injectable 4 milliGRAM(s) IV Push every 8 hours PRN  pantoprazole    Tablet 40 milliGRAM(s) Oral before breakfast  polyethylene glycol 3350 17 Gram(s) Oral two times a day  senna 2 Tablet(s) Oral at bedtime  sodium chloride 7% Inhalation 4 milliLiter(s) Inhalation every 6 hours  tiotropium 2.5 MICROgram(s) Inhaler 2 Puff(s) Inhalation daily  trimethoprim  160 mG/sulfamethoxazole 800 mG 1 Tablet(s) Oral daily    Current Antimicrobials:  nystatin    Suspension [id number] Unit(s) Swish and Swallow two times a day  trimethoprim  160 mG/sulfamethoxazole 800 mG 1 Tablet(s) Oral daily    Prior/Completed Antimicrobials:  ertapenem  IVPB  ertapenem  IVPB  linezolid    Tablet

## 2023-08-31 NOTE — PROGRESS NOTE ADULT - ASSESSMENT
72 female with ? Prosthetic Aortic Valve Stenosis, Bronchiectasis, Anemia    1.) Aortic Stenosis: Patient is S/P AVR/Bental last year, now presents with elevated gradients. SAFIA reviewed with Dr. Leahy confirmed severe bioprosthetic aortic stenosis.    Cardiac CT cancelled today, patient with history of contrast allergy. Original CT order was incorrectly entered, reordered TAVR CT per protocol for tomorrow. Pt. will need to be premedicated with Prednisone 50mg x3 doses scheduled at 13 hours, 7 hour, and 1 hour prior to CT in addition to Benadryl 50mg PO x1 1 hour prior to CT.   Final structural plan pending review of CT.      LUISITO Shaw NP  Available on TEAMS

## 2023-08-31 NOTE — PROGRESS NOTE ADULT - SUBJECTIVE AND OBJECTIVE BOX
seen earlier today     Chief Complaint: Diabetes Mellitus follow up    INTERVAL HX: reports tolerating 100% of meals, AC Lunch & FBG  BG remains above goal       Review of Systems:  General: As above  GI: No nausea, vomiting  Endocrine: no  S&Sx of hypoglycemia    Allergies    tetanus toxoid (Short breath)  cefepime (Anaphylaxis)  penicillin (Anaphylaxis)  Avelox (Short breath; Pruritus)  Dilaudid (Short breath)  codeine (Short breath)  aspirin (Short breath)  iodine (Short breath; Swelling)  Valium (Short breath)  shellfish (Anaphylaxis)    Intolerances      MEDICATIONS  (STANDING):  albuterol/ipratropium for Nebulization 3 milliLiter(s) Nebulizer every 6 hours  apixaban 5 milliGRAM(s) Oral every 12 hours  atorvastatin 20 milliGRAM(s) Oral at bedtime  buDESOnide    Inhalation Suspension 0.5 milliGRAM(s) Inhalation two times a day  chlorhexidine 4% Liquid 1 Application(s) Topical daily  clotrimazole 1% Cream 1 Application(s) Topical two times a day  dextrose 5%. 1000 milliLiter(s) (100 mL/Hr) IV Continuous <Continuous>  dextrose 5%. 1000 milliLiter(s) (50 mL/Hr) IV Continuous <Continuous>  dextrose 50% Injectable 12.5 Gram(s) IV Push once  dextrose 50% Injectable 25 Gram(s) IV Push once  dextrose 50% Injectable 25 Gram(s) IV Push once  folic acid 1 milliGRAM(s) Oral daily  glucagon  Injectable 1 milliGRAM(s) IntraMuscular once  insulin glargine Injectable (LANTUS) 30 Unit(s) SubCutaneous at bedtime  insulin lispro (ADMELOG) corrective regimen sliding scale   SubCutaneous <User Schedule>  insulin lispro (ADMELOG) corrective regimen sliding scale   SubCutaneous three times a day before meals  insulin lispro Injectable (ADMELOG) 12 Unit(s) SubCutaneous before breakfast  insulin lispro Injectable (ADMELOG) 18 Unit(s) SubCutaneous before dinner  insulin lispro Injectable (ADMELOG) 16 Unit(s) SubCutaneous before lunch  methylPREDNISolone   Oral   methylPREDNISolone 16 milliGRAM(s) Oral daily  mexiletine 200 milliGRAM(s) Oral every 8 hours  multivitamin/minerals 1 Tablet(s) Oral daily  nystatin    Suspension [id number] Unit(s) Swish and Swallow two times a day  pantoprazole    Tablet 40 milliGRAM(s) Oral before breakfast  polyethylene glycol 3350 17 Gram(s) Oral two times a day  senna 2 Tablet(s) Oral at bedtime  sodium chloride 7% Inhalation 4 milliLiter(s) Inhalation every 6 hours  tiotropium 2.5 MICROgram(s) Inhaler 2 Puff(s) Inhalation daily  trimethoprim  160 mG/sulfamethoxazole 800 mG 1 Tablet(s) Oral daily      atorvastatin   20 milliGRAM(s) Oral (08-30-23 @ 21:57)    insulin glargine Injectable (LANTUS)   30 Unit(s) SubCutaneous (08-30-23 @ 22:12)    insulin lispro (ADMELOG) corrective regimen sliding scale   4 Unit(s) SubCutaneous (08-31-23 @ 13:33)   2 Unit(s) SubCutaneous (08-31-23 @ 10:39)    insulin lispro Injectable (ADMELOG)   12 Unit(s) SubCutaneous (08-31-23 @ 10:39)    insulin lispro Injectable (ADMELOG)   16 Unit(s) SubCutaneous (08-31-23 @ 13:33)    methylPREDNISolone   16 milliGRAM(s) Oral (08-31-23 @ 05:43)        PHYSICAL EXAM:  VITALS: T(C): 36.8 (08-31-23 @ 15:04)  T(F): 98.2 (08-31-23 @ 15:04), Max: 98.5 (08-31-23 @ 05:20)  HR: 80 (08-31-23 @ 15:04) (72 - 81)  BP: 124/61 (08-31-23 @ 15:04) (115/61 - 150/72)  RR:  (15 - 18)  SpO2:  (95% - 100%)  Wt(kg): --  GENERAL: NAD  Respiratory: Respirations unlabored   Extremities: Warm, no edema  NEURO: Alert , appropriate     LABS:  POCT Blood Glucose.: 250 mg/dL (08-31-23 @ 13:31)  POCT Blood Glucose.: 183 mg/dL (08-31-23 @ 09:46)  POCT Blood Glucose.: 226 mg/dL (08-31-23 @ 02:12)  POCT Blood Glucose.: 149 mg/dL (08-30-23 @ 21:13)  POCT Blood Glucose.: 234 mg/dL (08-30-23 @ 13:00)  POCT Blood Glucose.: 305 mg/dL (08-30-23 @ 08:44)  POCT Blood Glucose.: 275 mg/dL (08-30-23 @ 01:56)  POCT Blood Glucose.: 118 mg/dL (08-29-23 @ 21:12)  POCT Blood Glucose.: 110 mg/dL (08-29-23 @ 17:34)  POCT Blood Glucose.: 126 mg/dL (08-29-23 @ 13:39)  POCT Blood Glucose.: 122 mg/dL (08-29-23 @ 08:38)  POCT Blood Glucose.: 190 mg/dL (08-29-23 @ 01:46)  POCT Blood Glucose.: 242 mg/dL (08-28-23 @ 21:20)  POCT Blood Glucose.: 299 mg/dL (08-28-23 @ 17:27)                          8.9    11.01 )-----------( 174      ( 31 Aug 2023 07:30 )             30.2     08-31    142  |  107  |  15  ----------------------------<  179<H>  4.3   |  24  |  0.70    Ca    8.3<L>      31 Aug 2023 07:12  Phos  2.7     08-31  Mg     2.3     08-31    TPro  5.4<L>  /  Alb  3.3  /  TBili  0.9  /  DBili  0.2  /  AST  75<H>  /  ALT  30  /  AlkPhos  67  08-30    eGFR: 91 mL/min/1.73m2 (31 Aug 2023 07:12)      Thyroid Function Tests:      A1C with Estimated Average Glucose Result: 9.1 % (06-17-23 @ 10:27)    Estimated Average Glucose: 214 mg/dL (06-17-23 @ 10:27)        Diet, Regular:   Consistent Carbohydrate Evening Snack (CSTCHOSN)  Supplement Feeding Modality:  Oral  Glucerna Shake Cans or Servings Per Day:  3       Frequency:  Three Times a day (08-31-23 @ 13:59) [Active]

## 2023-08-31 NOTE — PROGRESS NOTE ADULT - ASSESSMENT
72 F w/h/o uncontrolled T2DM (A1C 9.4%) on basal/bolus insulin PTA. Unknown DM complications. Also COPD, secondary adrenal Insufficiency on chronic steroids, colorectal cancer s/p resection (colostomy bag), Chronic A fib on Eliquis, and tracheomalacia and multiple intubations, type A aortic dissection s/p aortic dissection repair on 9/07/22, sepsis. Pt well known to this provider from prior admissions. Here with SOB> treated for PNA and on Prednisolone 24mg going to 16mg tomorrow. Also found to have anemia and severe aortic stenosis> hematology and ct surgery following pt.   Endocrine consulted for assistance with uncontrolled DM. BG Goal 100-180mg/dl. on MTP 16mg po qd since 8/30 with next taper planned for 9/6 adjust to  BG goal 100 to 180s. FBG above goal increase lantus , AC Lunch remains above goal adjust breakfast admelog   Will start Freestyle Luis E 3 if pt going home. Needs keshia on phone but pt states she wants daughter to do it.

## 2023-08-31 NOTE — PROGRESS NOTE ADULT - SUBJECTIVE AND OBJECTIVE BOX
INTERVAL HPI/OVERNIGHT EVENTS:  Patient S&E at bedside. No complaints at this time. No F/C, CP, SOB, abdominal pain, N/V, rash, or edema      VITAL SIGNS:  T(F): 98.2 (08-31-23 @ 15:04)  HR: 80 (08-31-23 @ 15:04)  BP: 124/61 (08-31-23 @ 15:04)  RR: 18 (08-31-23 @ 15:04)  SpO2: 98% (08-31-23 @ 15:04)  Wt(kg): --    PHYSICAL EXAM:    Constitutional: NAD  Eyes: EOMI, sclera non-icteric  Neck: supple  Respiratory: no increased WOB, CTAB/L  Cardiovascular: RRR, S1, S2, no m/g/r  Gastrointestinal: soft, NTND, no masses palpable, no HSM  Extremities: no c/c/e  Neurological: AAOx3    MEDICATIONS  (STANDING):  albuterol/ipratropium for Nebulization 3 milliLiter(s) Nebulizer every 6 hours  apixaban 5 milliGRAM(s) Oral every 12 hours  atorvastatin 20 milliGRAM(s) Oral at bedtime  buDESOnide    Inhalation Suspension 0.5 milliGRAM(s) Inhalation two times a day  chlorhexidine 4% Liquid 1 Application(s) Topical daily  clotrimazole 1% Cream 1 Application(s) Topical two times a day  dextrose 5%. 1000 milliLiter(s) (100 mL/Hr) IV Continuous <Continuous>  dextrose 5%. 1000 milliLiter(s) (50 mL/Hr) IV Continuous <Continuous>  dextrose 50% Injectable 25 Gram(s) IV Push once  dextrose 50% Injectable 12.5 Gram(s) IV Push once  dextrose 50% Injectable 25 Gram(s) IV Push once  folic acid 1 milliGRAM(s) Oral daily  glucagon  Injectable 1 milliGRAM(s) IntraMuscular once  insulin glargine Injectable (LANTUS) 32 Unit(s) SubCutaneous at bedtime  insulin lispro (ADMELOG) corrective regimen sliding scale   SubCutaneous three times a day before meals  insulin lispro (ADMELOG) corrective regimen sliding scale   SubCutaneous <User Schedule>  insulin lispro Injectable (ADMELOG) 18 Unit(s) SubCutaneous before dinner  insulin lispro Injectable (ADMELOG) 16 Unit(s) SubCutaneous before lunch  methylPREDNISolone 16 milliGRAM(s) Oral daily  methylPREDNISolone   Oral   mexiletine 200 milliGRAM(s) Oral every 8 hours  multivitamin/minerals 1 Tablet(s) Oral daily  nystatin    Suspension [id number] Unit(s) Swish and Swallow two times a day  pantoprazole    Tablet 40 milliGRAM(s) Oral before breakfast  polyethylene glycol 3350 17 Gram(s) Oral two times a day  senna 2 Tablet(s) Oral at bedtime  sodium chloride 7% Inhalation 4 milliLiter(s) Inhalation every 6 hours  tiotropium 2.5 MICROgram(s) Inhaler 2 Puff(s) Inhalation daily  trimethoprim  160 mG/sulfamethoxazole 800 mG 1 Tablet(s) Oral daily    MEDICATIONS  (PRN):  acetaminophen     Tablet .. 650 milliGRAM(s) Oral every 6 hours PRN Temp greater or equal to 38C (100.4F), Mild Pain (1 - 3)  aluminum hydroxide/magnesium hydroxide/simethicone Suspension 30 milliLiter(s) Oral every 4 hours PRN Dyspepsia  dextrose Oral Gel 15 Gram(s) Oral once PRN Blood Glucose LESS THAN 70 milliGRAM(s)/deciliter  melatonin 3 milliGRAM(s) Oral at bedtime PRN Insomnia  ondansetron Injectable 4 milliGRAM(s) IV Push every 8 hours PRN Nausea and/or Vomiting      LABS:                        8.9    11.01 )-----------( 174      ( 31 Aug 2023 07:30 )             30.2     08-31    142  |  107  |  15  ----------------------------<  179<H>  4.3   |  24  |  0.70    Ca    8.3<L>      31 Aug 2023 07:12  Phos  2.7     08-31  Mg     2.3     08-31    TPro  5.4<L>  /  Alb  3.3  /  TBili  0.9  /  DBili  0.2  /  AST  75<H>  /  ALT  30  /  AlkPhos  67  08-30      Urinalysis Basic - ( 31 Aug 2023 07:12 )    Color: x / Appearance: x / SG: x / pH: x  Gluc: 179 mg/dL / Ketone: x  / Bili: x / Urobili: x   Blood: x / Protein: x / Nitrite: x   Leuk Esterase: x / RBC: x / WBC x   Sq Epi: x / Non Sq Epi: x / Bacteria: x        RADIOLOGY & ADDITIONAL TESTS:

## 2023-09-01 LAB
ACANTHOCYTES BLD QL SMEAR: SLIGHT — SIGNIFICANT CHANGE UP
ALBUMIN SERPL ELPH-MCNC: 3.7 G/DL — SIGNIFICANT CHANGE UP (ref 3.3–5)
ALP SERPL-CCNC: 62 U/L — SIGNIFICANT CHANGE UP (ref 40–120)
ALT FLD-CCNC: 28 U/L — SIGNIFICANT CHANGE UP (ref 10–45)
ANION GAP SERPL CALC-SCNC: 11 MMOL/L — SIGNIFICANT CHANGE UP (ref 5–17)
ANISOCYTOSIS BLD QL: SIGNIFICANT CHANGE UP
AST SERPL-CCNC: 55 U/L — HIGH (ref 10–40)
BASOPHILS # BLD AUTO: 0 K/UL — SIGNIFICANT CHANGE UP (ref 0–0.2)
BASOPHILS NFR BLD AUTO: 0 % — SIGNIFICANT CHANGE UP (ref 0–2)
BILIRUB SERPL-MCNC: 1.2 MG/DL — SIGNIFICANT CHANGE UP (ref 0.2–1.2)
BUN SERPL-MCNC: 24 MG/DL — HIGH (ref 7–23)
CALCIUM SERPL-MCNC: 8.4 MG/DL — SIGNIFICANT CHANGE UP (ref 8.4–10.5)
CHLORIDE SERPL-SCNC: 104 MMOL/L — SIGNIFICANT CHANGE UP (ref 96–108)
CO2 SERPL-SCNC: 23 MMOL/L — SIGNIFICANT CHANGE UP (ref 22–31)
CREAT SERPL-MCNC: 0.65 MG/DL — SIGNIFICANT CHANGE UP (ref 0.5–1.3)
DACRYOCYTES BLD QL SMEAR: SLIGHT — SIGNIFICANT CHANGE UP
EGFR: 92 ML/MIN/1.73M2 — SIGNIFICANT CHANGE UP
ELLIPTOCYTES BLD QL SMEAR: SLIGHT — SIGNIFICANT CHANGE UP
EOSINOPHIL # BLD AUTO: 0 K/UL — SIGNIFICANT CHANGE UP (ref 0–0.5)
EOSINOPHIL NFR BLD AUTO: 0 % — SIGNIFICANT CHANGE UP (ref 0–6)
GLUCOSE BLDC GLUCOMTR-MCNC: 189 MG/DL — HIGH (ref 70–99)
GLUCOSE BLDC GLUCOMTR-MCNC: 210 MG/DL — HIGH (ref 70–99)
GLUCOSE BLDC GLUCOMTR-MCNC: 227 MG/DL — HIGH (ref 70–99)
GLUCOSE BLDC GLUCOMTR-MCNC: 329 MG/DL — HIGH (ref 70–99)
GLUCOSE BLDC GLUCOMTR-MCNC: 464 MG/DL — CRITICAL HIGH (ref 70–99)
GLUCOSE BLDC GLUCOMTR-MCNC: 501 MG/DL — CRITICAL HIGH (ref 70–99)
GLUCOSE SERPL-MCNC: 356 MG/DL — HIGH (ref 70–99)
HCT VFR BLD CALC: 31.2 % — LOW (ref 34.5–45)
HGB BLD-MCNC: 9.2 G/DL — LOW (ref 11.5–15.5)
LYMPHOCYTES # BLD AUTO: 0.15 K/UL — LOW (ref 1–3.3)
LYMPHOCYTES # BLD AUTO: 1.7 % — LOW (ref 13–44)
MACROCYTES BLD QL: SLIGHT — SIGNIFICANT CHANGE UP
MAGNESIUM SERPL-MCNC: 2.1 MG/DL — SIGNIFICANT CHANGE UP (ref 1.6–2.6)
MANUAL SMEAR VERIFICATION: SIGNIFICANT CHANGE UP
MCHC RBC-ENTMCNC: 24.8 PG — LOW (ref 27–34)
MCHC RBC-ENTMCNC: 29.5 GM/DL — LOW (ref 32–36)
MCV RBC AUTO: 84.1 FL — SIGNIFICANT CHANGE UP (ref 80–100)
METAMYELOCYTES # FLD: 0.9 % — HIGH (ref 0–0)
MICROCYTES BLD QL: SIGNIFICANT CHANGE UP
MONOCYTES # BLD AUTO: 0.08 K/UL — SIGNIFICANT CHANGE UP (ref 0–0.9)
MONOCYTES NFR BLD AUTO: 0.9 % — LOW (ref 2–14)
NEUTROPHILS # BLD AUTO: 8.76 K/UL — HIGH (ref 1.8–7.4)
NEUTROPHILS NFR BLD AUTO: 96.5 % — HIGH (ref 43–77)
NRBC # BLD: 29 /100 — HIGH (ref 0–0)
OVALOCYTES BLD QL SMEAR: SLIGHT — SIGNIFICANT CHANGE UP
PHOSPHATE SERPL-MCNC: 3.5 MG/DL — SIGNIFICANT CHANGE UP (ref 2.5–4.5)
PLAT MORPH BLD: NORMAL — SIGNIFICANT CHANGE UP
PLATELET # BLD AUTO: 180 K/UL — SIGNIFICANT CHANGE UP (ref 150–400)
POIKILOCYTOSIS BLD QL AUTO: SIGNIFICANT CHANGE UP
POLYCHROMASIA BLD QL SMEAR: SIGNIFICANT CHANGE UP
POTASSIUM SERPL-MCNC: 5.1 MMOL/L — SIGNIFICANT CHANGE UP (ref 3.5–5.3)
POTASSIUM SERPL-SCNC: 5.1 MMOL/L — SIGNIFICANT CHANGE UP (ref 3.5–5.3)
PROT SERPL-MCNC: 5.7 G/DL — LOW (ref 6–8.3)
RBC # BLD: 3.71 M/UL — LOW (ref 3.8–5.2)
RBC # FLD: 34.1 % — HIGH (ref 10.3–14.5)
RBC BLD AUTO: ABNORMAL
SCHISTOCYTES BLD QL AUTO: SIGNIFICANT CHANGE UP
SODIUM SERPL-SCNC: 138 MMOL/L — SIGNIFICANT CHANGE UP (ref 135–145)
WBC # BLD: 9.08 K/UL — SIGNIFICANT CHANGE UP (ref 3.8–10.5)
WBC # FLD AUTO: 9.08 K/UL — SIGNIFICANT CHANGE UP (ref 3.8–10.5)

## 2023-09-01 PROCEDURE — 75574 CT ANGIO HRT W/3D IMAGE: CPT | Mod: 26

## 2023-09-01 PROCEDURE — 99232 SBSQ HOSP IP/OBS MODERATE 35: CPT

## 2023-09-01 PROCEDURE — 74174 CTA ABD&PLVS W/CONTRAST: CPT | Mod: 26

## 2023-09-01 PROCEDURE — 71275 CT ANGIOGRAPHY CHEST: CPT | Mod: 26

## 2023-09-01 PROCEDURE — 99232 SBSQ HOSP IP/OBS MODERATE 35: CPT | Mod: GC

## 2023-09-01 RX ADMIN — MEXILETINE HYDROCHLORIDE 200 MILLIGRAM(S): 150 CAPSULE ORAL at 14:20

## 2023-09-01 RX ADMIN — Medication 500000 UNIT(S): at 06:05

## 2023-09-01 RX ADMIN — MEXILETINE HYDROCHLORIDE 200 MILLIGRAM(S): 150 CAPSULE ORAL at 06:04

## 2023-09-01 RX ADMIN — PANTOPRAZOLE SODIUM 40 MILLIGRAM(S): 20 TABLET, DELAYED RELEASE ORAL at 06:05

## 2023-09-01 RX ADMIN — Medication 12: at 10:13

## 2023-09-01 RX ADMIN — SODIUM CHLORIDE 4 MILLILITER(S): 9 INJECTION INTRAMUSCULAR; INTRAVENOUS; SUBCUTANEOUS at 17:43

## 2023-09-01 RX ADMIN — SENNA PLUS 2 TABLET(S): 8.6 TABLET ORAL at 22:18

## 2023-09-01 RX ADMIN — POLYETHYLENE GLYCOL 3350 17 GRAM(S): 17 POWDER, FOR SOLUTION ORAL at 06:05

## 2023-09-01 RX ADMIN — Medication 1 MILLIGRAM(S): at 12:44

## 2023-09-01 RX ADMIN — Medication 16 UNIT(S): at 14:24

## 2023-09-01 RX ADMIN — INSULIN GLARGINE 32 UNIT(S): 100 INJECTION, SOLUTION SUBCUTANEOUS at 22:18

## 2023-09-01 RX ADMIN — Medication 1 APPLICATION(S): at 17:48

## 2023-09-01 RX ADMIN — Medication 13 UNIT(S): at 10:13

## 2023-09-01 RX ADMIN — Medication 1 TABLET(S): at 12:44

## 2023-09-01 RX ADMIN — Medication 50 MILLIGRAM(S): at 08:25

## 2023-09-01 RX ADMIN — Medication 3 MILLILITER(S): at 17:42

## 2023-09-01 RX ADMIN — Medication 0.5 MILLIGRAM(S): at 17:42

## 2023-09-01 RX ADMIN — TIOTROPIUM BROMIDE 2 PUFF(S): 18 CAPSULE ORAL; RESPIRATORY (INHALATION) at 12:43

## 2023-09-01 RX ADMIN — Medication 1 APPLICATION(S): at 06:07

## 2023-09-01 RX ADMIN — Medication 500000 UNIT(S): at 17:42

## 2023-09-01 RX ADMIN — POLYETHYLENE GLYCOL 3350 17 GRAM(S): 17 POWDER, FOR SOLUTION ORAL at 17:42

## 2023-09-01 RX ADMIN — APIXABAN 5 MILLIGRAM(S): 2.5 TABLET, FILM COATED ORAL at 10:14

## 2023-09-01 RX ADMIN — Medication 50 MILLIGRAM(S): at 01:59

## 2023-09-01 RX ADMIN — APIXABAN 5 MILLIGRAM(S): 2.5 TABLET, FILM COATED ORAL at 22:18

## 2023-09-01 RX ADMIN — Medication 18 UNIT(S): at 17:43

## 2023-09-01 RX ADMIN — SODIUM CHLORIDE 4 MILLILITER(S): 9 INJECTION INTRAMUSCULAR; INTRAVENOUS; SUBCUTANEOUS at 06:05

## 2023-09-01 RX ADMIN — ATORVASTATIN CALCIUM 20 MILLIGRAM(S): 80 TABLET, FILM COATED ORAL at 22:18

## 2023-09-01 RX ADMIN — CHLORHEXIDINE GLUCONATE 1 APPLICATION(S): 213 SOLUTION TOPICAL at 12:44

## 2023-09-01 RX ADMIN — Medication 8: at 14:24

## 2023-09-01 RX ADMIN — Medication 4: at 17:43

## 2023-09-01 RX ADMIN — Medication 0.5 MILLIGRAM(S): at 06:04

## 2023-09-01 RX ADMIN — Medication 3 MILLILITER(S): at 06:04

## 2023-09-01 RX ADMIN — MEXILETINE HYDROCHLORIDE 200 MILLIGRAM(S): 150 CAPSULE ORAL at 22:19

## 2023-09-01 NOTE — PROGRESS NOTE ADULT - SUBJECTIVE AND OBJECTIVE BOX
[facility]  GISSELL Uriostegui Y. Patel, S. Shah, G. Terrell  [phone]  ([phone] - weekdays after 5pm and weekends)    Name: RAYRAY RODRIGUEZ  Age/Gender: 72y Female  MRN: [id number]    Interval History:  Patient seen and examined this morning.   No new complaints noted.  Notes reviewed  No concerning overnight events  Afebrile     Allergies: tetanus toxoid (Short breath)  cefepime (Anaphylaxis)  penicillin (Anaphylaxis)  Avelox (Short breath; Pruritus)  Dilaudid (Short breath)  codeine (Short breath)  aspirin (Short breath)  iodine (Short breath; Swelling)  Valium (Short breath)  shellfish (Anaphylaxis)      Objective:  Vitals:   T(F): 98.1 (09-01-23 @ 05:20), Max: 98.5 (08-31-23 @ 21:14)  HR: 83 (09-01-23 @ 05:20) (80 - 88)  BP: 105/67 (09-01-23 @ 05:20) (105/67 - 135/80)  RR: 18 (09-01-23 @ 05:20) (18 - 18)  SpO2: 98% (09-01-23 @ 05:20) (98% - 100%)  Physical Examination:  General: no acute distress  HEENT: NC/AT, anicteric, neck supple  Respiratory: no acc muscle use, breathing comfortably  Cardiovascular: S1 and S2 present  Gastrointestinal: normal appearing, nondistended  Extremities: no edema, no cyanosis  Skin: no visible rash    Laboratory Studies:  CBC:                       9.2    9.08  )-----------( 180      ( 01 Sep 2023 06:22 )             31.2     WBC Trend:  9.08 09-01-23 @ 06:22  11.01 08-31-23 @ 07:30  14.45 08-30-23 @ 14:27  13.55 08-30-23 @ 07:04  19.19 08-29-23 @ 07:38  21.03 08-28-23 @ 10:44  15.97 08-27-23 @ 11:23  13.95 08-26-23 @ 07:19    CMP: 09-01    138  |  104  |  24<H>  ----------------------------<  356<H>  5.1   |  23  |  0.65    Ca    8.4      01 Sep 2023 06:20  Phos  3.5     09-01  Mg     2.1     09-01    TPro  5.7<L>  /  Alb  3.7  /  TBili  1.2  /  DBili  x   /  AST  55<H>  /  ALT  28  /  AlkPhos  62  09-01    Creatinine: 0.65 mg/dL (09-01-23 @ 06:20)  Creatinine: 0.70 mg/dL (08-31-23 @ 07:12)  Creatinine: 0.71 mg/dL (08-30-23 @ 07:06)  Creatinine: 0.81 mg/dL (08-29-23 @ 07:34)  Creatinine: 0.79 mg/dL (08-28-23 @ 10:44)  Creatinine: 0.65 mg/dL (08-27-23 @ 11:23)  Creatinine: 0.67 mg/dL (08-26-23 @ 07:20)    LIVER FUNCTIONS - ( 01 Sep 2023 06:20 )  Alb: 3.7 g/dL / Pro: 5.7 g/dL / ALK PHOS: 62 U/L / ALT: 28 U/L / AST: 55 U/L / GGT: x           Microbiology: reviewed   Culture - Sputum (collected 08-26-23 @ 17:57)  Source: .Sputum Sputum  Gram Stain (08-27-23 @ 06:39):    Moderate polymorphonuclear leukocytes per low power field    No Squamous epithelial cells per low power field    Moderate Gram positive cocci in pairs seen per oil power field  Final Report (08-28-23 @ 22:06):    Normal Respiratory Jessica present    Culture - Sputum, Cystic Fibrosis (collected 08-18-23 @ 23:27)  Source: .Sputum Sputum  Gram Stain (08-19-23 @ 03:39):    No polymorphonuclear leukocytes per low power field    No Squamous epithelial cells per low power field    No organisms seen per oil power field  Final Report (08-22-23 @ 15:32):    Rare Methicillin Resistant Staphylococcus aureus    Rare Normal Respiratory Jessica present  Organism: Methicillin resistant Staphylococcus aureus (08-22-23 @ 15:32)  Organism: Methicillin resistant Staphylococcus aureus (08-22-23 @ 15:32)      -  Clindamycin: S <=0.25      -  Oxacillin: R >2      -  Gentamicin: S <=1 Should not be used as monotherapy      -  Linezolid: S 2      -  Cefazolin: R >16      -  Vancomycin: S 2      -  Tetracycline: S <=1      Method Type: GARRETT      -  Ampicillin/Sulbactam: R <=8/4      -  Penicillin: R >8      -  Rifampin: S <=1 Should not be used as monotherapy      -  Erythromycin: R >4      -  Trimethoprim/Sulfamethoxazole: R >2/38    SARS-CoV-2 Result: Neva (11 Aug 2023 16:06)    Radiology: reviewed     Medications:  acetaminophen     Tablet .. 650 milliGRAM(s) Oral every 6 hours PRN  albuterol/ipratropium for Nebulization 3 milliLiter(s) Nebulizer every 6 hours  aluminum hydroxide/magnesium hydroxide/simethicone Suspension 30 milliLiter(s) Oral every 4 hours PRN  apixaban 5 milliGRAM(s) Oral every 12 hours  atorvastatin 20 milliGRAM(s) Oral at bedtime  buDESOnide    Inhalation Suspension 0.5 milliGRAM(s) Inhalation two times a day  chlorhexidine 4% Liquid 1 Application(s) Topical daily  clotrimazole 1% Cream 1 Application(s) Topical two times a day  dextrose 5%. 1000 milliLiter(s) IV Continuous <Continuous>  dextrose 5%. 1000 milliLiter(s) IV Continuous <Continuous>  dextrose 50% Injectable 25 Gram(s) IV Push once  dextrose 50% Injectable 12.5 Gram(s) IV Push once  dextrose 50% Injectable 25 Gram(s) IV Push once  dextrose Oral Gel 15 Gram(s) Oral once PRN  diphenhydrAMINE 50 milliGRAM(s) Oral <User Schedule>  folic acid 1 milliGRAM(s) Oral daily  glucagon  Injectable 1 milliGRAM(s) IntraMuscular once  insulin glargine Injectable (LANTUS) 32 Unit(s) SubCutaneous at bedtime  insulin lispro (ADMELOG) corrective regimen sliding scale   SubCutaneous three times a day before meals  insulin lispro (ADMELOG) corrective regimen sliding scale   SubCutaneous <User Schedule>  insulin lispro Injectable (ADMELOG) 13 Unit(s) SubCutaneous before breakfast  insulin lispro Injectable (ADMELOG) 16 Unit(s) SubCutaneous before lunch  insulin lispro Injectable (ADMELOG) 18 Unit(s) SubCutaneous before dinner  melatonin 3 milliGRAM(s) Oral at bedtime PRN  mexiletine 200 milliGRAM(s) Oral every 8 hours  multivitamin/minerals 1 Tablet(s) Oral daily  nystatin    Suspension [id number] Unit(s) Swish and Swallow two times a day  ondansetron Injectable 4 milliGRAM(s) IV Push every 8 hours PRN  pantoprazole    Tablet 40 milliGRAM(s) Oral before breakfast  polyethylene glycol 3350 17 Gram(s) Oral two times a day  senna 2 Tablet(s) Oral at bedtime  sodium chloride 7% Inhalation 4 milliLiter(s) Inhalation every 6 hours  tiotropium 2.5 MICROgram(s) Inhaler 2 Puff(s) Inhalation daily  trimethoprim  160 mG/sulfamethoxazole 800 mG 1 Tablet(s) Oral daily    Current Antimicrobials:  nystatin    Suspension 150131 Unit(s) Swish and Swallow two times a day  trimethoprim  160 mG/sulfamethoxazole 800 mG 1 Tablet(s) Oral daily    Prior/Completed Antimicrobials:  ertapenem  IVPB  ertapenem  IVPB  linezolid    Tablet

## 2023-09-01 NOTE — PROGRESS NOTE ADULT - NUTRITIONAL ASSESSMENT
This patient has been assessed with a concern for Malnutrition and has been determined to have a diagnosis/diagnoses of Moderate protein-calorie malnutrition.    This patient is being managed with:   Diet Regular-  Consistent Carbohydrate {Evening Snack} (CSTCHOSN)  Supplement Feeding Modality:  Oral  Glucerna Shake Cans or Servings Per Day:  3       Frequency:  Three Times a day  Entered: Aug 31 2023  1:53PM

## 2023-09-01 NOTE — PROGRESS NOTE ADULT - ASSESSMENT
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 P/w SOB, found to have bronchial secretions on CT, admit to medicine for exacerbation of bronchiectasis. Sputum culture growing MRSA, completed 7 days linezolid and ertapenem. Anemic, likely hemolytic from TAVR according to heme. SAFIA completed 8/30. Gated CT per structural heart and plans for TAVR/MEL procedure per Structural heart team.

## 2023-09-01 NOTE — PROGRESS NOTE ADULT - NUTRITIONAL ASSESSMENT
Diet, Regular:   Consistent Carbohydrate {Evening Snack} (CSTCHOSN)  Supplement Feeding Modality:  Oral  Glucerna Shake Cans or Servings Per Day:  3       Frequency:  Three Times a day (08-31-23 @ 13:59) [Active]      Please see RD assessment and/or follow up.  Managed by primary team as well

## 2023-09-01 NOTE — PROGRESS NOTE ADULT - SUBJECTIVE AND OBJECTIVE BOX
DIABETES FOLLOW UP NOTE: Saw pt earlier today    Chief Complaint: Endocrine consult requested for management of T2DM    INTERVAL HX: Pt awaiting CT today. Noted BG over 500s this morning after pt received 3 doses of Prednisone 50mg in the last 12 hours in preparation for iv contrast today in the setting of IV dye allergy. BG at lunch time in 300s. No hypoglycemia. Didn't receive Methylprednisolone 16mg today.       Review of Systems:  General: As above  Cardiovascular: No chest pain, palpitations  Respiratory: No SOB, no cough  GI: No nausea, vomiting, abdominal pain  Endocrine: no polyuria, polydipsia or S&Sx of hypoglycemia    Allergies    tetanus toxoid (Short breath)  cefepime (Anaphylaxis)  penicillin (Anaphylaxis)  Avelox (Short breath; Pruritus)  Dilaudid (Short breath)  codeine (Short breath)  aspirin (Short breath)  iodine (Short breath; Swelling)  Valium (Short breath)  shellfish (Anaphylaxis)    Intolerances      MEDICATIONS:  atorvastatin 20 milliGRAM(s) Oral at bedtime  insulin glargine Injectable (LANTUS) 32 Unit(s) SubCutaneous at bedtime  insulin lispro (ADMELOG) corrective regimen sliding scale   SubCutaneous three times a day before meals  insulin lispro (ADMELOG) corrective regimen sliding scale   SubCutaneous <User Schedule>  insulin lispro Injectable (ADMELOG) 18 Unit(s) SubCutaneous before dinner  insulin lispro Injectable (ADMELOG) 16 Unit(s) SubCutaneous before lunch  insulin lispro Injectable (ADMELOG) 13 Unit(s) SubCutaneous before breakfast  trimethoprim  160 mG/sulfamethoxazole 800 mG 1 Tablet(s) Oral daily      PHYSICAL EXAM:  VITALS: T(C): 36.8 (09-01-23 @ 13:53)  T(F): 98.2 (09-01-23 @ 13:53), Max: 98.5 (08-31-23 @ 21:14)  HR: 86 (09-01-23 @ 13:53) (83 - 88)  BP: 115/74 (09-01-23 @ 13:53) (105/67 - 135/80)  RR:  (18 - 18)  SpO2:  (98% - 100%)  Wt(kg): --  GENERAL: Female laying in bed in NAD  Abdomen: Soft, nontender, non distended  Extremities: Warm, no edema in all 4 exts  NEURO: Alert and able to answer questions      LABS:  POCT Blood Glucose.: 329 mg/dL (09-01-23 @ 14:19)  POCT Blood Glucose.: 501 mg/dL (09-01-23 @ 10:04)  POCT Blood Glucose.: 464 mg/dL (09-01-23 @ 10:00)  POCT Blood Glucose.: 227 mg/dL (09-01-23 @ 01:56)  POCT Blood Glucose.: 99 mg/dL (08-31-23 @ 21:49)  POCT Blood Glucose.: 119 mg/dL (08-31-23 @ 17:34)  POCT Blood Glucose.: 250 mg/dL (08-31-23 @ 13:31)  POCT Blood Glucose.: 183 mg/dL (08-31-23 @ 09:46)  POCT Blood Glucose.: 226 mg/dL (08-31-23 @ 02:12)  POCT Blood Glucose.: 149 mg/dL (08-30-23 @ 21:13)  POCT Blood Glucose.: 234 mg/dL (08-30-23 @ 13:00)  POCT Blood Glucose.: 305 mg/dL (08-30-23 @ 08:44)  POCT Blood Glucose.: 275 mg/dL (08-30-23 @ 01:56)  POCT Blood Glucose.: 118 mg/dL (08-29-23 @ 21:12)  POCT Blood Glucose.: 110 mg/dL (08-29-23 @ 17:34)                            9.2    9.08  )-----------( 180      ( 01 Sep 2023 06:22 )             31.2       09-01    138  |  104  |  24<H>  ----------------------------<  356<H>  5.1   |  23  |  0.65    eGFR: 92    Ca    8.4      09-01  Mg     2.1     09-01  Phos  3.5     09-01    TPro  5.7<L>  /  Alb  3.7  /  TBili  1.2  /  DBili  x   /  AST  55<H>  /  ALT  28  /  AlkPhos  62  09-01      A1C with Estimated Average Glucose Result: 9.1 % (06-17-23 @ 10:27)      Estimated Average Glucose: 214 mg/dL (06-17-23 @ 10:27)

## 2023-09-01 NOTE — PROGRESS NOTE ADULT - ASSESSMENT
72 F w/h/o uncontrolled T2DM (A1C 9.4%) on basal/bolus insulin PTA. Unknown DM complications. Also COPD, secondary adrenal Insufficiency on chronic steroids, colorectal cancer s/p resection (colostomy bag), Chronic A fib on Eliquis, and tracheomalacia and multiple intubations, type A aortic dissection s/p aortic dissection repair on 9/07/22, sepsis. Pt well known to this provider from prior admissions. Here with SOB> treated for PNA and on Prednisolone  16mg started yesterday. Also found to have anemia and severe aortic stenosis> hematology and ct surgery following pt.   Endocrine consulted for assistance with uncontrolled DM. Pt going for IV contrast angio but is allergic to dye> pt received Prednisone 50mg x 3 in the last 12 hours with rebound hyperglycemia because insulin doses were not adjusted. BG somewhat better at 12noon and expecting to improve once steroid effect wears off. in the next 12 hours. Will continue with present insulin doses for now and will adjust as needed to BG Goal 100-180mg/dl. Needs to restart MTP 16mg po tomorrow for AI.     Will start Freestyle Luis E 3 if pt going home. Needs keshia on phone but pt states she wants daughter to do it.

## 2023-09-01 NOTE — PROGRESS NOTE ADULT - SUBJECTIVE AND OBJECTIVE BOX
HPI/Interval Hx    Sitting OOB-chair, offering no complaints. Cardiac CT completed this morning.    MEDICATIONS  (STANDING):  albuterol/ipratropium for Nebulization 3 milliLiter(s) Nebulizer every 6 hours  apixaban 5 milliGRAM(s) Oral every 12 hours  atorvastatin 20 milliGRAM(s) Oral at bedtime  buDESOnide    Inhalation Suspension 0.5 milliGRAM(s) Inhalation two times a day  chlorhexidine 4% Liquid 1 Application(s) Topical daily  clotrimazole 1% Cream 1 Application(s) Topical two times a day  dextrose 5%. 1000 milliLiter(s) (100 mL/Hr) IV Continuous <Continuous>  dextrose 5%. 1000 milliLiter(s) (50 mL/Hr) IV Continuous <Continuous>  dextrose 50% Injectable 12.5 Gram(s) IV Push once  dextrose 50% Injectable 25 Gram(s) IV Push once  dextrose 50% Injectable 25 Gram(s) IV Push once  diphenhydrAMINE 50 milliGRAM(s) Oral <User Schedule>  folic acid 1 milliGRAM(s) Oral daily  glucagon  Injectable 1 milliGRAM(s) IntraMuscular once  insulin glargine Injectable (LANTUS) 32 Unit(s) SubCutaneous at bedtime  insulin lispro (ADMELOG) corrective regimen sliding scale   SubCutaneous <User Schedule>  insulin lispro (ADMELOG) corrective regimen sliding scale   SubCutaneous three times a day before meals  insulin lispro Injectable (ADMELOG) 18 Unit(s) SubCutaneous before dinner  insulin lispro Injectable (ADMELOG) 16 Unit(s) SubCutaneous before lunch  insulin lispro Injectable (ADMELOG) 13 Unit(s) SubCutaneous before breakfast  mexiletine 200 milliGRAM(s) Oral every 8 hours  multivitamin/minerals 1 Tablet(s) Oral daily  nystatin    Suspension [id number] Unit(s) Swish and Swallow two times a day  pantoprazole    Tablet 40 milliGRAM(s) Oral before breakfast  polyethylene glycol 3350 17 Gram(s) Oral two times a day  senna 2 Tablet(s) Oral at bedtime  sodium chloride 7% Inhalation 4 milliLiter(s) Inhalation every 6 hours  tiotropium 2.5 MICROgram(s) Inhaler 2 Puff(s) Inhalation daily  trimethoprim  160 mG/sulfamethoxazole 800 mG 1 Tablet(s) Oral daily    MEDICATIONS  (PRN):  acetaminophen     Tablet .. 650 milliGRAM(s) Oral every 6 hours PRN Temp greater or equal to 38C (100.4F), Mild Pain (1 - 3)  aluminum hydroxide/magnesium hydroxide/simethicone Suspension 30 milliLiter(s) Oral every 4 hours PRN Dyspepsia  dextrose Oral Gel 15 Gram(s) Oral once PRN Blood Glucose LESS THAN 70 milliGRAM(s)/deciliter  melatonin 3 milliGRAM(s) Oral at bedtime PRN Insomnia  ondansetron Injectable 4 milliGRAM(s) IV Push every 8 hours PRN Nausea and/or Vomiting      PAST MEDICAL & SURGICAL HISTORY:  Atrial fibrillation  paroxysmal, on eliquis      Diabetes  Type 2      COPD (chronic obstructive pulmonary disease)      Adrenal insufficiency  Medrol daily for over 50 years      Aortic insufficiency  moderate AR on echo 5/3/2018      Pelvic fracture      Asthma      Tracheobronchomalacia  diagnosed 2015, s/p bronchial thermoplasty 2016 (Dr Zapien); recent bronchoscopy 6/5/2018 revealed no evidence of tracheobronchomalacia in trachea or bronchial tubes      Colorectal cancer  4/2018- last treatment , chemo and radiation      Rectal bleeding      Seizure  x 1 1/7/18      DVT (deep venous thrombosis)  15-20 years ago, took coumadin      TIA (transient ischemic attack)  multiple, last 5 years ago - presents as right-sided weakness      History of partial hysterectomy  30 years ago - fibroids      H/O total knee replacement, bilateral  5 years ago      History of sinus surgery  multiple sinus surgeries      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)- course complicated by obstruction, respiratory failure, multiple CPR attempts -  stent discontinued; 10/20/2016 Tracheobronchoplasty (Prolene Mesh) performed at [facility] by Dr Zapien      S/P bronchoscopy  6/5/2018 - [place] (Dr Zapien) no evidence of tracheobronchomalacia in trachea or bronchial tubes      Rectal bleeding  exam under anesthesia ([facility]) 2/2018          Review of Systems  CONSTITUTIONAL: No weakness, fevers or chills  EYES/ENT: No visual changes;  No vertigo or throat pain   NECK: No pain or stiffness  RESPIRATORY: No cough, wheezing, hemoptysis; No shortness of breath at rest  CARDIOVASCULAR: No chest pain or palpitations, PND, orthopnea, or edema   GASTROINTESTINAL: No abdominal or epigastric pain. No nausea, vomiting, or hematemesis; No diarrhea or constipation. No melena or hematochezia.  GENITOURINARY: No dysuria, frequency or hematuria  NEUROLOGICAL: No numbness or weakness  SKIN: No itching, burning, rashes, or lesions   All other review of systems is negative unless indicated above.    Physical Exam  General: A/ox 3, No acute Distress  Neck: Supple, (+) JVD  Cardiac: S1 S2, II/VI RUSB murmur  Pulmonary: Breathing unlabored, No Rhonchi/Rales/Wheezing  Abdomen: Soft, Non -tender, +BS x 4 quads  Extremities: No Rashes, No edema  Neuro: A/o x 3, No focal deficits    Vital Signs Last 24 Hrs  T(C): 36.8 (01 Sep 2023 13:53), Max: 36.9 (31 Aug 2023 21:14)  T(F): 98.2 (01 Sep 2023 13:53), Max: 98.5 (31 Aug 2023 21:14)  HR: 86 (01 Sep 2023 13:53) (80 - 88)  BP: 115/74 (01 Sep 2023 13:53) (105/67 - 135/80)  BP(mean): --  RR: 18 (01 Sep 2023 13:53) (18 - 18)  SpO2: 100% (01 Sep 2023 13:53) (98% - 100%)    Parameters below as of 01 Sep 2023 13:53  Patient On (Oxygen Delivery Method): room air                            9.2    9.08  )-----------( 180      ( 01 Sep 2023 06:22 )             31.2     09-01    138  |  104  |  24<H>  ----------------------------<  356<H>  5.1   |  23  |  0.65    Ca    8.4      01 Sep 2023 06:20  Phos  3.5     09-01  Mg     2.1     09-01    TPro  5.7<L>  /  Alb  3.7  /  TBili  1.2  /  DBili  x   /  AST  55<H>  /  ALT  28  /  AlkPhos  62  09-01      Other  < from: SAFIA W or WO Ultrasound Enhancing Agent (08.30.23 @ 16:50) >  TRANSESOPHAGEAL ECHOCARDIOGRAM REPORT  ________________________________________________________________________________                                      _______       Pt. Name:       RAYRAY MARTINEZ             Study Date:   8/30/2023          MICHAEL  MRN:            [id number]                   Date of       1950    < end of copied text >  < from: SAFIA W or WO Ultrasound Enhancing Agent (08.30.23 @ 16:50) >  FINDINGS:     Left Ventricle:  Left ventricular wall thickness is moderately increased. Left ventricular systolic function is normal with an ejection fraction visually estimated at 65 to 70%.     Right Ventricle:  Normal right ventricular systolic function.     Left Atrium:  There is no evidence of left atrial or left atrial appendage thrombus. The left atrial appendage emptying velocity is normal.     Right Atrium:  The right atrium is moderately dilated in size.     Interatrial Septum:  Agitated saline injection was negative for intracardiac shunt.     Aortic Valve:  A bioprosthetic valve replacement present in the aortic position. There is severe prosthetic aortic stenosis. There is no intravalvular regurgitation. Leaflets are poorly visualized. The peak transaortic velocity is 4.23 m/s, peak transaortic gradient is 71.6 mmHg and mean transaortic gradient is 45.0 mmHg with an LVOT/aortic valve VTI ratio of 0.24. The effectivce orifice area is estimated at 0.68 cm² by the continuity equation.     Mitral Valve:  There is normal leaflet mobility of the mitral valve. There is calcification of the mitral valve annulus. There is mild leaflet calcification. Mitral valve leaflets have focal calcification. There is severe mitral regurgitation. 3 D VCA 0.73 sq cm.     Tricuspid Valve:  Structurally normal tricuspid valve with normal leaflet excursion. There is trace tricuspid regurgitation.     Aorta:  There is a type B aorta dissection. S/p repair of a proximal aorta disssection. Unable to image the aortic arch. Chronic dissection of the desceding thoracic aorta.     Pericardium:  No pericardial effusion seen.  ____________________________________________________________________    < end of copied text >

## 2023-09-01 NOTE — PROGRESS NOTE ADULT - SUBJECTIVE AND OBJECTIVE BOX
CC: Patient is a 72y old  Female who presents with a chief complaint of sob (31 Aug 2023 16:35)      INTERVAL EVENTS: ERIKA    SUBJECTIVE / INTERVAL HPI: Patient seen and examined at bedside.     ROS: Denies fever, denies nausea, denies vomiting    VITAL SIGNS:  Vital Signs Last 24 Hrs  T(C): 36.7 (01 Sep 2023 05:20), Max: 36.9 (31 Aug 2023 21:14)  T(F): 98.1 (01 Sep 2023 05:20), Max: 98.5 (31 Aug 2023 21:14)  HR: 83 (01 Sep 2023 05:20) (80 - 88)  BP: 105/67 (01 Sep 2023 05:20) (105/67 - 135/80)  BP(mean): --  RR: 18 (01 Sep 2023 05:20) (18 - 18)  SpO2: 98% (01 Sep 2023 05:20) (98% - 100%)    Parameters below as of 01 Sep 2023 05:20  Patient On (Oxygen Delivery Method): room air          08-31-23 @ 07:01  -  09-01-23 @ 07:00  --------------------------------------------------------  IN: 980 mL / OUT: 0 mL / NET: 980 mL        PHYSICAL EXAM:    General: NAD  Eyes: PERRLA, EOMI, Sclera Icteric   Cardiovascular: +S1/S2; RRR  Respiratory: CTA B/L; no W/R/R  Gastrointestinal: soft, NT/ND  Extremities: WWP; no edema, clubbing or cyanosis  Vascular: 2+ radial, DP/PT pulses B/L  Neurological: AAOx3; no focal deficits  Psych: Mood: Affect: Congruent and Appropriate     MEDICATIONS:  MEDICATIONS  (STANDING):  albuterol/ipratropium for Nebulization 3 milliLiter(s) Nebulizer every 6 hours  apixaban 5 milliGRAM(s) Oral every 12 hours  atorvastatin 20 milliGRAM(s) Oral at bedtime  buDESOnide    Inhalation Suspension 0.5 milliGRAM(s) Inhalation two times a day  chlorhexidine 4% Liquid 1 Application(s) Topical daily  clotrimazole 1% Cream 1 Application(s) Topical two times a day  dextrose 5%. 1000 milliLiter(s) (100 mL/Hr) IV Continuous <Continuous>  dextrose 5%. 1000 milliLiter(s) (50 mL/Hr) IV Continuous <Continuous>  dextrose 50% Injectable 12.5 Gram(s) IV Push once  dextrose 50% Injectable 25 Gram(s) IV Push once  dextrose 50% Injectable 25 Gram(s) IV Push once  diphenhydrAMINE 50 milliGRAM(s) Oral <User Schedule>  folic acid 1 milliGRAM(s) Oral daily  glucagon  Injectable 1 milliGRAM(s) IntraMuscular once  insulin glargine Injectable (LANTUS) 32 Unit(s) SubCutaneous at bedtime  insulin lispro (ADMELOG) corrective regimen sliding scale   SubCutaneous <User Schedule>  insulin lispro (ADMELOG) corrective regimen sliding scale   SubCutaneous three times a day before meals  insulin lispro Injectable (ADMELOG) 18 Unit(s) SubCutaneous before dinner  insulin lispro Injectable (ADMELOG) 16 Unit(s) SubCutaneous before lunch  insulin lispro Injectable (ADMELOG) 13 Unit(s) SubCutaneous before breakfast  mexiletine 200 milliGRAM(s) Oral every 8 hours  multivitamin/minerals 1 Tablet(s) Oral daily  nystatin    Suspension 432853 Unit(s) Swish and Swallow two times a day  pantoprazole    Tablet 40 milliGRAM(s) Oral before breakfast  polyethylene glycol 3350 17 Gram(s) Oral two times a day  predniSONE   Tablet 50 milliGRAM(s) Oral <User Schedule>  senna 2 Tablet(s) Oral at bedtime  sodium chloride 7% Inhalation 4 milliLiter(s) Inhalation every 6 hours  tiotropium 2.5 MICROgram(s) Inhaler 2 Puff(s) Inhalation daily  trimethoprim  160 mG/sulfamethoxazole 800 mG 1 Tablet(s) Oral daily    MEDICATIONS  (PRN):  acetaminophen     Tablet .. 650 milliGRAM(s) Oral every 6 hours PRN Temp greater or equal to 38C (100.4F), Mild Pain (1 - 3)  aluminum hydroxide/magnesium hydroxide/simethicone Suspension 30 milliLiter(s) Oral every 4 hours PRN Dyspepsia  dextrose Oral Gel 15 Gram(s) Oral once PRN Blood Glucose LESS THAN 70 milliGRAM(s)/deciliter  melatonin 3 milliGRAM(s) Oral at bedtime PRN Insomnia  ondansetron Injectable 4 milliGRAM(s) IV Push every 8 hours PRN Nausea and/or Vomiting      ALLERGIES:  Allergies    tetanus toxoid (Short breath)  cefepime (Anaphylaxis)  penicillin (Anaphylaxis)  Avelox (Short breath; Pruritus)  Dilaudid (Short breath)  codeine (Short breath)  aspirin (Short breath)  iodine (Short breath; Swelling)  Valium (Short breath)  shellfish (Anaphylaxis)    Intolerances        LABS:                        8.9    11.01 )-----------( 174      ( 31 Aug 2023 07:30 )             30.2     09-01    138  |  104  |  24<H>  ----------------------------<  356<H>  5.1   |  23  |  0.65    Ca    8.4      01 Sep 2023 06:20  Phos  3.5     09-01  Mg     2.1     09-01    TPro  5.7<L>  /  Alb  3.7  /  TBili  1.2  /  DBili  x   /  AST  55<H>  /  ALT  28  /  AlkPhos  62  09-01      Urinalysis Basic - ( 01 Sep 2023 06:20 )    Color: x / Appearance: x / SG: x / pH: x  Gluc: 356 mg/dL / Ketone: x  / Bili: x / Urobili: x   Blood: x / Protein: x / Nitrite: x   Leuk Esterase: x / RBC: x / WBC x   Sq Epi: x / Non Sq Epi: x / Bacteria: x      CAPILLARY BLOOD GLUCOSE      POCT Blood Glucose.: 227 mg/dL (01 Sep 2023 01:56)      RADIOLOGY & ADDITIONAL TESTS: Reviewed.

## 2023-09-01 NOTE — PROGRESS NOTE ADULT - ASSESSMENT
72 female with ? Prosthetic Aortic Valve Stenosis, Bronchiectasis, Anemia    1.) Aortic Stenosis: Patient is S/P AVR/Bental last year, now presents with elevated gradients. SAFIA reviewed with Dr. Leahy confirmed severe bioprosthetic aortic stenosis.    Cardiac CT completed this morning, awaiting review with radiology and Dr. Leahy   Final structural plan pending review     LUISITO Shaw NP  [id number]  Available on TEAMS

## 2023-09-01 NOTE — CHART NOTE - NSCHARTNOTEFT_GEN_A_CORE
72 year old woman PMHx  asthma on chronic steroids, bronchiectasis s/p surgery, allergic rhinitis,  recurrent PNA, PND, tracheomalacia s/p tracheoplasty, ESBL proteus infections (sputum),  diabetes, paroxysmal A-fib on Eliquis, colorectal cancer status post colostomy in remission presenting with concern for shortness of breath found to have acute on chronic bronchiectasis exacerbation c/b worsening anemia. Hematology on board for anemia work up.  -Hapto low, LDH elevated, Retic elevated concerning for hemolysis. Jenae negative  -Smear reviewed many fragmented RBC, nucleated RBCs, hypersegmented neutrophils. Hemolysis is likely secondary to severe aortic stenosis.   - continue with transfusinoal support     Hematology will sign off at this time   Please call us back with further questions     Nadine Duenas MD   PGY6 heme onc fellow

## 2023-09-01 NOTE — PROGRESS NOTE ADULT - ASSESSMENT
71 yo PMHx  asthma on chronic steroids, bronchiectasis s/p surgery, allergic rhinitis,  recurrent PNA, PND, tracheomalacia s/p tracheoplasty, ESBL proteus infections (sputum),  diabetes, paroxysmal A-fib on Eliquis, colorectal cancer many years ago status post colostomy presenting with concern for shortness of breath. Prior issues with ESBL/Proteus/yeast in sputum culture and was treated with ertapenem/Benadryl/vancomycin/aztreonam and methylprednisolone.   She was discharged home with a midline and completed a course of antibiotics ertapenem (last dose 06/25) PT now readmitted with severe dyspnea and reports that having had asthma for 61 years she feels like she needs an antibiotic. Reports not tolerating meropenem but has tolerated ertapenem. Patient follows with Dr Allison who knows this complicated pt very well. Noted pt has PCN, cefepime allergies    Suspect likely Bronchiectasis exacerbation  Leukocytosis- suspect likely reactive in setting of steroids   -Repeat CXR with no pneumonia  -Fungitell negative   -8/15 Scx with normal resp val   -8/14 Sputum AFB smear negative -f/u culture to rule out DIEUDONNE - NGTD  -8/17 CF Scx with mod MRSA, Pantoea (previously Enterobacter) and normal resp val  -8/26 repeat Scx with normal resp val -- ok with taking off isolation, defer to IC  - WBC normalized, remains afebrile, feels better overall     S/p linezolid day (8/18-8/24)  S/p ertapenem (8/12-8/18)    Pulmonary following - changed to po steroid 8/19 with taper  Continue off antibiotics other than bactrim for PJP ppx  CTS following for bioprosthetic AV with stenosis, s/p SAFIA   Supportive care, chest PT, neb treatments   Continue rest of care per primary team    Stable from ID standpoint at this time  ID will sign off at this time but remains available for any further questions/concerns.  Patient can follow up with us as outpatient postdischarge to follow up cultures    Tello Fernandez M.D.  [facility], Division of Infectious Diseases  [phone]  After 5pm on weekdays and all day on weekends - please call [phone]

## 2023-09-01 NOTE — PROGRESS NOTE ADULT - PROBLEM SELECTOR PLAN 1
Anemia  - Anemia workup c/f hemolysis, heme and CTS consulted  - Heme suggests hemolytic 2/2 AV stenosis from TAVR or natural valve based on smears and darby neg.  - CTS consulted, not a candidate for open surgical repair  - SAFIA 8/30 per structural cardiology. 1u PRBC given beforehand.  - Valve severely stenotic on SAFIA, Gated cardiac CT to assess anatomy for TAV in JENNIFER procedure - following cards rec. per Dr. Leahy

## 2023-09-01 NOTE — PROGRESS NOTE ADULT - PROBLEM SELECTOR PLAN 2
Acute Exacerbation of Bronchiectasis - Stable     - Initial CT showed increased bronchial secretions and wheezing managed with 7% saline and Q6 albuterol Neb  - MRSA + Sputum s/p treatment with Ertapenem -> Linezolid  - Off treatment dose antibiotics with just Bactrim Ppx  - Currently on solumedrol taper - 24mg decrease by 8mg/wk to 8mg  - Clinically improved

## 2023-09-01 NOTE — PROGRESS NOTE ADULT - PROBLEM SELECTOR PLAN 4
On chronic steroids at home > medrol 8mg qd from admission in 2022  Per pt she has been sick this year with multiple hospitalizations requiring pulse steroids. Was on Methylprednisolone 28mg PTA.   -Pt on slow titration back to Medrol 8mg qd  Need to re-order MTP 16mg for tomorrow> team aware   - Will need stress steroids if acutely ill. Has been on higher dose this year due to SOB exacerbations/hospitalizations.

## 2023-09-01 NOTE — PROGRESS NOTE ADULT - PROBLEM SELECTOR PLAN 1
Test BG ac and hs and 2am while on steroids  C/w Lantus dose 32 units qhs for now since MTP dose came down yesterday and BG expected to improve once Prednisone wears off  C/w Admelog doses 13-16-18 units qac   Hold if NPO or eating less than 50% of meals.  C/w Mod correction scale qac + bedtime+ check 2 am BG  Please contact endo team with any changes on steroid doses!! Endo not informed about Prednsone doses given in the last 12 hours..   Restart Methylprednisolone 16 mg tomorrow  Discharge:  Will be determined according to BG/insulin needs/PO intake and steroid therapy at time of discharge.  Plan to d/c on basal bolus. Doses TBD  Outpt. endo follow-up. Dr Saavedra  Outpt. optho, podiatry, micro/cr  Make sure pt has Rx for all DM supplies and insulin/ DM meds. Consider [facility] if going home

## 2023-09-02 LAB
ALBUMIN SERPL ELPH-MCNC: 3.5 G/DL — SIGNIFICANT CHANGE UP (ref 3.3–5)
ALP SERPL-CCNC: 56 U/L — SIGNIFICANT CHANGE UP (ref 40–120)
ALT FLD-CCNC: 25 U/L — SIGNIFICANT CHANGE UP (ref 10–45)
ANION GAP SERPL CALC-SCNC: 11 MMOL/L — SIGNIFICANT CHANGE UP (ref 5–17)
AST SERPL-CCNC: 58 U/L — HIGH (ref 10–40)
BASOPHILS # BLD AUTO: 0.01 K/UL — SIGNIFICANT CHANGE UP (ref 0–0.2)
BASOPHILS NFR BLD AUTO: 0.1 % — SIGNIFICANT CHANGE UP (ref 0–2)
BILIRUB SERPL-MCNC: 1 MG/DL — SIGNIFICANT CHANGE UP (ref 0.2–1.2)
BUN SERPL-MCNC: 26 MG/DL — HIGH (ref 7–23)
CALCIUM SERPL-MCNC: 8.8 MG/DL — SIGNIFICANT CHANGE UP (ref 8.4–10.5)
CHLORIDE SERPL-SCNC: 106 MMOL/L — SIGNIFICANT CHANGE UP (ref 96–108)
CO2 SERPL-SCNC: 25 MMOL/L — SIGNIFICANT CHANGE UP (ref 22–31)
CREAT SERPL-MCNC: 0.71 MG/DL — SIGNIFICANT CHANGE UP (ref 0.5–1.3)
EGFR: 89 ML/MIN/1.73M2 — SIGNIFICANT CHANGE UP
EOSINOPHIL # BLD AUTO: 0.02 K/UL — SIGNIFICANT CHANGE UP (ref 0–0.5)
EOSINOPHIL NFR BLD AUTO: 0.2 % — SIGNIFICANT CHANGE UP (ref 0–6)
GLUCOSE BLDC GLUCOMTR-MCNC: 148 MG/DL — HIGH (ref 70–99)
GLUCOSE BLDC GLUCOMTR-MCNC: 176 MG/DL — HIGH (ref 70–99)
GLUCOSE BLDC GLUCOMTR-MCNC: 176 MG/DL — HIGH (ref 70–99)
GLUCOSE BLDC GLUCOMTR-MCNC: 184 MG/DL — HIGH (ref 70–99)
GLUCOSE BLDC GLUCOMTR-MCNC: 250 MG/DL — HIGH (ref 70–99)
GLUCOSE SERPL-MCNC: 192 MG/DL — HIGH (ref 70–99)
HCT VFR BLD CALC: 29.2 % — LOW (ref 34.5–45)
HGB BLD-MCNC: 8.5 G/DL — LOW (ref 11.5–15.5)
IMM GRANULOCYTES NFR BLD AUTO: 1.2 % — HIGH (ref 0–0.9)
LYMPHOCYTES # BLD AUTO: 1.03 K/UL — SIGNIFICANT CHANGE UP (ref 1–3.3)
LYMPHOCYTES # BLD AUTO: 8.3 % — LOW (ref 13–44)
MAGNESIUM SERPL-MCNC: 2.1 MG/DL — SIGNIFICANT CHANGE UP (ref 1.6–2.6)
MCHC RBC-ENTMCNC: 23.9 PG — LOW (ref 27–34)
MCHC RBC-ENTMCNC: 29.1 GM/DL — LOW (ref 32–36)
MCV RBC AUTO: 82 FL — SIGNIFICANT CHANGE UP (ref 80–100)
MONOCYTES # BLD AUTO: 1.08 K/UL — HIGH (ref 0–0.9)
MONOCYTES NFR BLD AUTO: 8.7 % — SIGNIFICANT CHANGE UP (ref 2–14)
NEUTROPHILS # BLD AUTO: 10.15 K/UL — HIGH (ref 1.8–7.4)
NEUTROPHILS NFR BLD AUTO: 81.5 % — HIGH (ref 43–77)
NRBC # BLD: 9 /100 WBCS — HIGH (ref 0–0)
PHOSPHATE SERPL-MCNC: 2.8 MG/DL — SIGNIFICANT CHANGE UP (ref 2.5–4.5)
PLATELET # BLD AUTO: 197 K/UL — SIGNIFICANT CHANGE UP (ref 150–400)
POTASSIUM SERPL-MCNC: 4.5 MMOL/L — SIGNIFICANT CHANGE UP (ref 3.5–5.3)
POTASSIUM SERPL-SCNC: 4.5 MMOL/L — SIGNIFICANT CHANGE UP (ref 3.5–5.3)
PROT SERPL-MCNC: 5.4 G/DL — LOW (ref 6–8.3)
RBC # BLD: 3.56 M/UL — LOW (ref 3.8–5.2)
RBC # FLD: 36.7 % — HIGH (ref 10.3–14.5)
SODIUM SERPL-SCNC: 142 MMOL/L — SIGNIFICANT CHANGE UP (ref 135–145)
WBC # BLD: 12.44 K/UL — HIGH (ref 3.8–10.5)
WBC # FLD AUTO: 12.44 K/UL — HIGH (ref 3.8–10.5)

## 2023-09-02 PROCEDURE — 99232 SBSQ HOSP IP/OBS MODERATE 35: CPT

## 2023-09-02 RX ADMIN — Medication 13 UNIT(S): at 09:18

## 2023-09-02 RX ADMIN — Medication 16 UNIT(S): at 13:02

## 2023-09-02 RX ADMIN — APIXABAN 5 MILLIGRAM(S): 2.5 TABLET, FILM COATED ORAL at 09:18

## 2023-09-02 RX ADMIN — Medication 3 MILLILITER(S): at 06:59

## 2023-09-02 RX ADMIN — Medication 1 MILLIGRAM(S): at 12:03

## 2023-09-02 RX ADMIN — TIOTROPIUM BROMIDE 2 PUFF(S): 18 CAPSULE ORAL; RESPIRATORY (INHALATION) at 12:02

## 2023-09-02 RX ADMIN — MEXILETINE HYDROCHLORIDE 200 MILLIGRAM(S): 150 CAPSULE ORAL at 07:00

## 2023-09-02 RX ADMIN — Medication 1 APPLICATION(S): at 06:58

## 2023-09-02 RX ADMIN — POLYETHYLENE GLYCOL 3350 17 GRAM(S): 17 POWDER, FOR SOLUTION ORAL at 06:59

## 2023-09-02 RX ADMIN — CHLORHEXIDINE GLUCONATE 1 APPLICATION(S): 213 SOLUTION TOPICAL at 12:07

## 2023-09-02 RX ADMIN — INSULIN GLARGINE 32 UNIT(S): 100 INJECTION, SOLUTION SUBCUTANEOUS at 21:45

## 2023-09-02 RX ADMIN — Medication 2: at 13:01

## 2023-09-02 RX ADMIN — Medication 2: at 09:16

## 2023-09-02 RX ADMIN — Medication 1 TABLET(S): at 12:03

## 2023-09-02 RX ADMIN — Medication 0.5 MILLIGRAM(S): at 17:25

## 2023-09-02 RX ADMIN — Medication 3 MILLILITER(S): at 12:02

## 2023-09-02 RX ADMIN — Medication 3 MILLILITER(S): at 17:57

## 2023-09-02 RX ADMIN — MEXILETINE HYDROCHLORIDE 200 MILLIGRAM(S): 150 CAPSULE ORAL at 21:46

## 2023-09-02 RX ADMIN — Medication 500000 UNIT(S): at 06:59

## 2023-09-02 RX ADMIN — MEXILETINE HYDROCHLORIDE 200 MILLIGRAM(S): 150 CAPSULE ORAL at 13:02

## 2023-09-02 RX ADMIN — SODIUM CHLORIDE 4 MILLILITER(S): 9 INJECTION INTRAMUSCULAR; INTRAVENOUS; SUBCUTANEOUS at 17:58

## 2023-09-02 RX ADMIN — Medication 2: at 17:58

## 2023-09-02 RX ADMIN — POLYETHYLENE GLYCOL 3350 17 GRAM(S): 17 POWDER, FOR SOLUTION ORAL at 17:25

## 2023-09-02 RX ADMIN — Medication 0.5 MILLIGRAM(S): at 06:59

## 2023-09-02 RX ADMIN — APIXABAN 5 MILLIGRAM(S): 2.5 TABLET, FILM COATED ORAL at 21:45

## 2023-09-02 RX ADMIN — SODIUM CHLORIDE 4 MILLILITER(S): 9 INJECTION INTRAMUSCULAR; INTRAVENOUS; SUBCUTANEOUS at 06:59

## 2023-09-02 RX ADMIN — SENNA PLUS 2 TABLET(S): 8.6 TABLET ORAL at 21:46

## 2023-09-02 RX ADMIN — Medication 18 UNIT(S): at 17:58

## 2023-09-02 RX ADMIN — SODIUM CHLORIDE 4 MILLILITER(S): 9 INJECTION INTRAMUSCULAR; INTRAVENOUS; SUBCUTANEOUS at 12:02

## 2023-09-02 RX ADMIN — Medication 16 MILLIGRAM(S): at 12:26

## 2023-09-02 RX ADMIN — Medication 500000 UNIT(S): at 17:25

## 2023-09-02 RX ADMIN — PANTOPRAZOLE SODIUM 40 MILLIGRAM(S): 20 TABLET, DELAYED RELEASE ORAL at 06:59

## 2023-09-02 RX ADMIN — ATORVASTATIN CALCIUM 20 MILLIGRAM(S): 80 TABLET, FILM COATED ORAL at 21:45

## 2023-09-02 NOTE — PROGRESS NOTE ADULT - ASSESSMENT
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 P/w SOB, found to have bronchial secretions on CT, admit to medicine for exacerbation of bronchiectasis. Sputum culture growing MRSA, completed 7 days linezolid and ertapenem. Anemic, likely hemolytic from TAVR according to heme. SAFIA completed 8/30. Gated CT per structural heart and plans for TAVR/MEL procedure per Structural heart team.   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 P/w SOB, found to have bronchial secretions on CT, admit to medicine for exacerbation of bronchiectasis. Sputum culture growing MRSA, completed 7 days linezolid and ertapenem. Anemic, likely hemolytic from TAVR according to heme. SAFIA completed 8/30. Gated CT per structural heart completed and plans for TAVR/MEL procedure per Structural heart team, likely next week.

## 2023-09-02 NOTE — PROGRESS NOTE ADULT - PROBLEM SELECTOR PLAN 1
Anemia  - Anemia workup c/f hemolysis, heme and CTS consulted  - Heme suggests hemolytic 2/2 AV stenosis from TAVR or natural valve based on smears and darby neg.  - CTS consulted, not a candidate for open surgical repair  - SAFIA 8/30 per structural cardiology. 1u PRBC given beforehand.  - Valve severely stenotic on SAFIA, Gated cardiac CT to assess anatomy for TAV in JENNIFER procedure - following cards rec. per Dr. Laehy Anemia  - Anemia workup c/f hemolysis, heme and CTS consulted  - Heme suggests hemolytic 2/2 AV stenosis from TAVR or natural valve based on smears and darby neg.  - CTS consulted, not a candidate for open surgical repair  - SAFIA 8/30 per structural cardiology. 1u PRBC given beforehand.  - Valve severely stenotic on SAFIA, Gated cardiac CT to assess anatomy for TAV in JENNIFER procedure completed  - following cards rec. per Dr. Leahy

## 2023-09-02 NOTE — PROGRESS NOTE ADULT - ATTENDING COMMENTS
72F  with history of bronchiectasis on chronic steroids, s/p surgery, allergic rhinitis,  recurrent PNA, PND, tracheomalacia s/p tracheoplasty, ESBL proteus infections (sputum), T2DM, paroxysmal A-fib on Eliquis, colorectal cancer many years ago status post colostomy presenting with shortness of breath of 3 day duration. Prior issues with ESBL/Proteus/yeast in sputum culture and was treated with ertapenem/Benadryl/vancomycin/aztreonam and methylprednisolone. She was discharged home with a midline and completed a course of antibiotics ertapenem (last dose 06/25). She was also recently  treated for MRSA and pseudomonas in sputum and completed Tobra nebs and doxycycline.    Acute bronchiectasis- stable- on steroid taper. 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 SAFIA confirmed the severe AS. Appreciate input by structural cards. Will need replacement likely next week.   T2DM with hyperglycemia- goal BS less than 200- adjust insulin    Rest of care per plan above  D/W Dr Mortensen, HS6

## 2023-09-02 NOTE — PROGRESS NOTE ADULT - PROBLEM SELECTOR PLAN 3
- c/w Eliquis, Mexiletine  - EKG NSR 75, bifascicular block Diabetes:  - On 25-30 lantus at home, and 7-20 mealtime as well  - Goal less than >200  - Initially had steroid induced hyperglycemia, now on taper   - Endo following and dosing insulin accordingly

## 2023-09-02 NOTE — PROGRESS NOTE ADULT - SUBJECTIVE AND OBJECTIVE BOX
[facility]    [address]    [place] [address]    Phone:  [phone]    Fax:  [phone]       Thank You for choosing us for your health care visit. We are glad to serve you and happy to provide you with this summary of your visit. Please help us to ensure we have accurate records. If you find anything that needs to be changed, please let our staff know as soon as possible.          Your Demographic Information     Patient Name Sex Nathalie Guerrero Female 1959       Ethnic Group Patient Race    Not of  or  Origin White      Your Visit Details     Date & Time Provider Department    2017 11:00 AM Irina Lo NP Count includes the [facility]      Your Upcoming Appointment*(Max 10)     Wednesday May 17, 2017 10:30 AM CDT   Complete Physical Exam with Irina Lo NP   [facility] ([facility])    [address]  [facility] [address] [phone]            2017  8:40 AM CDT   Follow-up Visit with Clem Gomez MD   [facility] [address]  Endocrinology ([facility] )    [address] [facility] [address] [phone]              Conditions Discussed Today or Order-Related Diagnoses        Comments    Acute bacterial conjunctivitis of both eyes    -  Primary     Essential hypertension         Allergic rhinitis due to other allergic trigger, unspecified rhinitis seasonality           Your Vitals Were     BP Temp Height Weight BMI Smoking Status    170/80 98.6 °F (37 °C) 5' 3\" (1.6 m) 149 lb 14.6 oz (68 kg) 26.56 kg/m2 Former Smoker      Medications Prescribed or Re-Ordered Today     nebivolol (BYSTOLIC) 5 MG tablet    Sig - Route: Take 2 tablets by mouth daily. - Oral    Class: Eprescribe    Pharmacy: [facility]/pharmacy #[id number] - [place] - [address] AT [facility] Ph #: [phone]    tobramycin (TOBREX) 0.3 % ophthalmic solution    Sig - Route: Place 1 drop into both  eyes every 4 hours. - Both Eyes    Class: Eprescribe    Pharmacy: [facility]/pharmacy #[id number] - [place], [place] - [address] AT [facility] Ph #: [phone]      Your Current Medications Are        Disp Refills Start End    nebivolol (BYSTOLIC) 5 MG tablet 60 tablet 2 2017     Sig - Route: Take 2 tablets by mouth daily. - Oral    Class: Eprescribe    trimethoprim-polymyxin b (POLYTRIM) ophthalmic solution 10 mL 0 2017     Sig - Route: Place 1 drop into both eyes every 4 hours. - Both Eyes    Class: Eprescribe    PROAIR  (90 Base) MCG/ACT inhaler 8.5 Inhaler 0 3/17/2017     Sig: INHALE 2 PUFFS BY MOUTH EVERY 4 HOURS AS NEEDED FOR SHORTNESS OF BREATH OR WHEEZING    Class: Eprescribe    guaiFENesin-codeine (GUAIFENESIN AC) 100-10 MG/5ML liquid 120 mL 0 3/7/2017     Sig - Route: Take 5 mLs by mouth 3 times daily as needed for Cough. - Oral    Notes to Pharmacy: Needs sugar free (is a diabetic)    metformin (GLUCOPHAGE) 1000 MG tablet 180 tablet 1 2017     Sig: TAKE 1 TABLET BY MOUTH 2 TIMES DAILY (WITH MEALS).    Class: Eprescribe    blood glucose test strips (ONE TOUCH ULTRA TEST) 200 strip 3 2016     Si times daily. Test blood sugar 2 times daily as directed. Diagnosis: Controlled Type 2 DM with out complications E11.9    Class: Eprescribe    Lancets (ONETOUCH ULTRASOFT) Misc 100 each 3 2016     Sig: Use to test daily either before breakfast or dinner.    Class: Eprescribe    Azelastine-Fluticasone (DYMISTA NA)        Class: Historical Med    Route: Nasal    cholecalciferol (VITAMIN D3) 1000 UNITS tablet        Sig - Route: Take 1,000 Units by mouth daily. - Oral    Class: Historical Med    aspirin 81 MG tablet   2012     Sig - Route: Take 1 tablet by mouth daily. - Oral    Class: Historical Med    multivitamin (THERAGRAN) per tablet   2012     Sig - Route: Take 1 tablet by mouth daily. - Oral    Class: Historical Med    tobramycin (TOBREX) 0.3 % ophthalmic solution 5  mL 0 4/27/2017     Sig - Route: Place 1 drop into both eyes every 4 hours. - Both Eyes    Class: Eprescribe      Allergies     Erythromycin PRURITUS    Tightness in throat      Immunizations History as of 4/27/2017     Name Date    Tdap 7/28/2014  9:53 AM      Problem List as of 4/27/2017     Type 2 diabetes mellitus    Hyperlipemia    Abnormal liver function test    Vitamin D insufficiency    Hypertension    GERD (gastroesophageal reflux disease)    Circumscribed scleroderma(aka LICHEN SCLEROSIS)              Patient Instructions    Use eye drops as directed until redness and drainage is resolved then 2 more days  Continue loratadine every day  Restart Dymista at nighttime    Regarding elevated blood pressure: Increase by systolic to 10 mg daily    Consider purchasing a blood pressure monitor and record blood pressure 2-3 times a week at different times during the day.  Goal is for the top number (systolic) blood pressure to be BELOW 130 and bottom number (diastolic) below 85.  Another option is to return to clinic to have blood pressure taken, this can be done anytime our clinic is open.  Please bring readings to next clinic visit.             CC: Patient is a 72y old  Female who presents with a chief complaint of sob (01 Sep 2023 16:21)      INTERVAL EVENTS: ERIKA    SUBJECTIVE / INTERVAL HPI: Patient seen and examined at bedside.     ROS: Denies fever, denies nausea, denies vomiting    VITAL SIGNS:  Vital Signs Last 24 Hrs  T(C): 36.8 (02 Sep 2023 06:30), Max: 36.8 (01 Sep 2023 13:53)  T(F): 98.3 (02 Sep 2023 06:30), Max: 98.3 (02 Sep 2023 06:30)  HR: 77 (02 Sep 2023 06:30) (77 - 90)  BP: 97/66 (02 Sep 2023 06:30) (97/66 - 137/83)  BP(mean): --  RR: 18 (02 Sep 2023 06:30) (18 - 18)  SpO2: 100% (02 Sep 2023 06:30) (98% - 100%)    Parameters below as of 02 Sep 2023 06:30  Patient On (Oxygen Delivery Method): room air          09-01-23 @ 07:01  -  09-02-23 @ 07:00  --------------------------------------------------------  IN: 690 mL / OUT: 0 mL / NET: 690 mL        PHYSICAL EXAM:    General: NAD  Eyes: PERRLA, EOMI, Sclera Icteric   Cardiovascular: +S1/S2; RRR  Respiratory: CTA B/L; no W/R/R  Gastrointestinal: soft, NT/ND  Extremities: WWP; no edema, clubbing or cyanosis  Vascular: 2+ radial, DP/PT pulses B/L  Neurological: AAOx3; no focal deficits  Psych: Mood: Affect: Congruent and Appropriate     MEDICATIONS:  MEDICATIONS  (STANDING):  albuterol/ipratropium for Nebulization 3 milliLiter(s) Nebulizer every 6 hours  apixaban 5 milliGRAM(s) Oral every 12 hours  atorvastatin 20 milliGRAM(s) Oral at bedtime  buDESOnide    Inhalation Suspension 0.5 milliGRAM(s) Inhalation two times a day  chlorhexidine 4% Liquid 1 Application(s) Topical daily  clotrimazole 1% Cream 1 Application(s) Topical two times a day  dextrose 5%. 1000 milliLiter(s) (50 mL/Hr) IV Continuous <Continuous>  dextrose 5%. 1000 milliLiter(s) (100 mL/Hr) IV Continuous <Continuous>  dextrose 50% Injectable 25 Gram(s) IV Push once  dextrose 50% Injectable 12.5 Gram(s) IV Push once  dextrose 50% Injectable 25 Gram(s) IV Push once  diphenhydrAMINE 50 milliGRAM(s) Oral <User Schedule>  folic acid 1 milliGRAM(s) Oral daily  glucagon  Injectable 1 milliGRAM(s) IntraMuscular once  insulin glargine Injectable (LANTUS) 32 Unit(s) SubCutaneous at bedtime  insulin lispro (ADMELOG) corrective regimen sliding scale   SubCutaneous three times a day before meals  insulin lispro (ADMELOG) corrective regimen sliding scale   SubCutaneous <User Schedule>  insulin lispro Injectable (ADMELOG) 16 Unit(s) SubCutaneous before lunch  insulin lispro Injectable (ADMELOG) 13 Unit(s) SubCutaneous before breakfast  insulin lispro Injectable (ADMELOG) 18 Unit(s) SubCutaneous before dinner  mexiletine 200 milliGRAM(s) Oral every 8 hours  multivitamin/minerals 1 Tablet(s) Oral daily  nystatin    Suspension 924094 Unit(s) Swish and Swallow two times a day  pantoprazole    Tablet 40 milliGRAM(s) Oral before breakfast  polyethylene glycol 3350 17 Gram(s) Oral two times a day  senna 2 Tablet(s) Oral at bedtime  sodium chloride 7% Inhalation 4 milliLiter(s) Inhalation every 6 hours  tiotropium 2.5 MICROgram(s) Inhaler 2 Puff(s) Inhalation daily  trimethoprim  160 mG/sulfamethoxazole 800 mG 1 Tablet(s) Oral daily    MEDICATIONS  (PRN):  acetaminophen     Tablet .. 650 milliGRAM(s) Oral every 6 hours PRN Temp greater or equal to 38C (100.4F), Mild Pain (1 - 3)  aluminum hydroxide/magnesium hydroxide/simethicone Suspension 30 milliLiter(s) Oral every 4 hours PRN Dyspepsia  dextrose Oral Gel 15 Gram(s) Oral once PRN Blood Glucose LESS THAN 70 milliGRAM(s)/deciliter  melatonin 3 milliGRAM(s) Oral at bedtime PRN Insomnia  ondansetron Injectable 4 milliGRAM(s) IV Push every 8 hours PRN Nausea and/or Vomiting      ALLERGIES:  Allergies    tetanus toxoid (Short breath)  cefepime (Anaphylaxis)  penicillin (Anaphylaxis)  Avelox (Short breath; Pruritus)  Dilaudid (Short breath)  codeine (Short breath)  aspirin (Short breath)  iodine (Short breath; Swelling)  Valium (Short breath)  shellfish (Anaphylaxis)    Intolerances        LABS:                        9.2    9.08  )-----------( 180      ( 01 Sep 2023 06:22 )             31.2     09-01    138  |  104  |  24<H>  ----------------------------<  356<H>  5.1   |  23  |  0.65    Ca    8.4      01 Sep 2023 06:20  Phos  3.5     09-01  Mg     2.1     09-01    TPro  5.7<L>  /  Alb  3.7  /  TBili  1.2  /  DBili  x   /  AST  55<H>  /  ALT  28  /  AlkPhos  62  09-01      Urinalysis Basic - ( 01 Sep 2023 06:20 )    Color: x / Appearance: x / SG: x / pH: x  Gluc: 356 mg/dL / Ketone: x  / Bili: x / Urobili: x   Blood: x / Protein: x / Nitrite: x   Leuk Esterase: x / RBC: x / WBC x   Sq Epi: x / Non Sq Epi: x / Bacteria: x      CAPILLARY BLOOD GLUCOSE      POCT Blood Glucose.: 250 mg/dL (02 Sep 2023 02:01)      RADIOLOGY & ADDITIONAL TESTS: Reviewed. CC: Patient is a 72y old  Female who presents with a chief complaint of sob (01 Sep 2023 16:21)      INTERVAL EVENTS: ERIKA, slept peacefully tonight    SUBJECTIVE / INTERVAL HPI: Patient seen and examined at bedside. Said she wants to walk but doesn't want to get sick by walking around the hallway.     ROS: Denies fever, denies nausea, denies vomiting    VITAL SIGNS:  Vital Signs Last 24 Hrs  T(C): 36.8 (02 Sep 2023 06:30), Max: 36.8 (01 Sep 2023 13:53)  T(F): 98.3 (02 Sep 2023 06:30), Max: 98.3 (02 Sep 2023 06:30)  HR: 77 (02 Sep 2023 06:30) (77 - 90)  BP: 97/66 (02 Sep 2023 06:30) (97/66 - 137/83)  BP(mean): --  RR: 18 (02 Sep 2023 06:30) (18 - 18)  SpO2: 100% (02 Sep 2023 06:30) (98% - 100%)    Parameters below as of 02 Sep 2023 06:30  Patient On (Oxygen Delivery Method): room air          09-01-23 @ 07:01  -  09-02-23 @ 07:00  --------------------------------------------------------  IN: 690 mL / OUT: 0 mL / NET: 690 mL        PHYSICAL EXAM:    General: NAD  Eyes: PERRLA, EOMI, Sclera Icteric   Cardiovascular: +S1/S2; RRR  Respiratory: CTA B/L; no W/R/R  Gastrointestinal: soft, NT/ND  Extremities: WWP; no edema, clubbing or cyanosis  Vascular: 2+ radial, DP/PT pulses B/L  Neurological: AAOx3; no focal deficits  Psych: Mood: Affect: Congruent and Appropriate     MEDICATIONS:  MEDICATIONS  (STANDING):  albuterol/ipratropium for Nebulization 3 milliLiter(s) Nebulizer every 6 hours  apixaban 5 milliGRAM(s) Oral every 12 hours  atorvastatin 20 milliGRAM(s) Oral at bedtime  buDESOnide    Inhalation Suspension 0.5 milliGRAM(s) Inhalation two times a day  chlorhexidine 4% Liquid 1 Application(s) Topical daily  clotrimazole 1% Cream 1 Application(s) Topical two times a day  dextrose 5%. 1000 milliLiter(s) (50 mL/Hr) IV Continuous <Continuous>  dextrose 5%. 1000 milliLiter(s) (100 mL/Hr) IV Continuous <Continuous>  dextrose 50% Injectable 25 Gram(s) IV Push once  dextrose 50% Injectable 12.5 Gram(s) IV Push once  dextrose 50% Injectable 25 Gram(s) IV Push once  diphenhydrAMINE 50 milliGRAM(s) Oral <User Schedule>  folic acid 1 milliGRAM(s) Oral daily  glucagon  Injectable 1 milliGRAM(s) IntraMuscular once  insulin glargine Injectable (LANTUS) 32 Unit(s) SubCutaneous at bedtime  insulin lispro (ADMELOG) corrective regimen sliding scale   SubCutaneous three times a day before meals  insulin lispro (ADMELOG) corrective regimen sliding scale   SubCutaneous <User Schedule>  insulin lispro Injectable (ADMELOG) 16 Unit(s) SubCutaneous before lunch  insulin lispro Injectable (ADMELOG) 13 Unit(s) SubCutaneous before breakfast  insulin lispro Injectable (ADMELOG) 18 Unit(s) SubCutaneous before dinner  mexiletine 200 milliGRAM(s) Oral every 8 hours  multivitamin/minerals 1 Tablet(s) Oral daily  nystatin    Suspension 845087 Unit(s) Swish and Swallow two times a day  pantoprazole    Tablet 40 milliGRAM(s) Oral before breakfast  polyethylene glycol 3350 17 Gram(s) Oral two times a day  senna 2 Tablet(s) Oral at bedtime  sodium chloride 7% Inhalation 4 milliLiter(s) Inhalation every 6 hours  tiotropium 2.5 MICROgram(s) Inhaler 2 Puff(s) Inhalation daily  trimethoprim  160 mG/sulfamethoxazole 800 mG 1 Tablet(s) Oral daily    MEDICATIONS  (PRN):  acetaminophen     Tablet .. 650 milliGRAM(s) Oral every 6 hours PRN Temp greater or equal to 38C (100.4F), Mild Pain (1 - 3)  aluminum hydroxide/magnesium hydroxide/simethicone Suspension 30 milliLiter(s) Oral every 4 hours PRN Dyspepsia  dextrose Oral Gel 15 Gram(s) Oral once PRN Blood Glucose LESS THAN 70 milliGRAM(s)/deciliter  melatonin 3 milliGRAM(s) Oral at bedtime PRN Insomnia  ondansetron Injectable 4 milliGRAM(s) IV Push every 8 hours PRN Nausea and/or Vomiting      ALLERGIES:  Allergies    tetanus toxoid (Short breath)  cefepime (Anaphylaxis)  penicillin (Anaphylaxis)  Avelox (Short breath; Pruritus)  Dilaudid (Short breath)  codeine (Short breath)  aspirin (Short breath)  iodine (Short breath; Swelling)  Valium (Short breath)  shellfish (Anaphylaxis)    Intolerances        LABS:                        9.2    9.08  )-----------( 180      ( 01 Sep 2023 06:22 )             31.2     09-01    138  |  104  |  24<H>  ----------------------------<  356<H>  5.1   |  23  |  0.65    Ca    8.4      01 Sep 2023 06:20  Phos  3.5     09-01  Mg     2.1     09-01    TPro  5.7<L>  /  Alb  3.7  /  TBili  1.2  /  DBili  x   /  AST  55<H>  /  ALT  28  /  AlkPhos  62  09-01      Urinalysis Basic - ( 01 Sep 2023 06:20 )    Color: x / Appearance: x / SG: x / pH: x  Gluc: 356 mg/dL / Ketone: x  / Bili: x / Urobili: x   Blood: x / Protein: x / Nitrite: x   Leuk Esterase: x / RBC: x / WBC x   Sq Epi: x / Non Sq Epi: x / Bacteria: x      CAPILLARY BLOOD GLUCOSE      POCT Blood Glucose.: 250 mg/dL (02 Sep 2023 02:01)      RADIOLOGY & ADDITIONAL TESTS: Reviewed. CC: Patient is a 72y old  Female who presents with a chief complaint of sob (01 Sep 2023 16:21)      INTERVAL EVENTS: ERIKA, slept peacefully tonight    SUBJECTIVE / INTERVAL HPI: Patient seen and examined at bedside. Said she wants to walk but doesn't want to get sick by walking around the hallway.     ROS: Denies fever, denies nausea, denies vomiting    VITAL SIGNS:  Vital Signs Last 24 Hrs  T(C): 36.8 (02 Sep 2023 06:30), Max: 36.8 (01 Sep 2023 13:53)  T(F): 98.3 (02 Sep 2023 06:30), Max: 98.3 (02 Sep 2023 06:30)  HR: 77 (02 Sep 2023 06:30) (77 - 90)  BP: 97/66 (02 Sep 2023 06:30) (97/66 - 137/83)  BP(mean): --  RR: 18 (02 Sep 2023 06:30) (18 - 18)  SpO2: 100% (02 Sep 2023 06:30) (98% - 100%)    Parameters below as of 02 Sep 2023 06:30  Patient On (Oxygen Delivery Method): room air          09-01-23 @ 07:01  -  09-02-23 @ 07:00  --------------------------------------------------------  IN: 690 mL / OUT: 0 mL / NET: 690 mL        PHYSICAL EXAM:    General: NAD, eating breakfast  Eyes: PERRLA, EOMI, Sclera anicteric, HM/LP in L eye.   Cardiovascular: +S1/S2; RRR  Respiratory: CTA B/L; no W/R/R  Gastrointestinal: soft, NT/ND  Extremities: WWP; no edema, clubbing or cyanosis  Vascular: 2+ radial, DP/PT pulses B/L  Neurological: AAOx3; no focal deficits  Psych: Mood: "pretty good" Affect: Congruent and Appropriate     MEDICATIONS:  MEDICATIONS  (STANDING):  albuterol/ipratropium for Nebulization 3 milliLiter(s) Nebulizer every 6 hours  apixaban 5 milliGRAM(s) Oral every 12 hours  atorvastatin 20 milliGRAM(s) Oral at bedtime  buDESOnide    Inhalation Suspension 0.5 milliGRAM(s) Inhalation two times a day  chlorhexidine 4% Liquid 1 Application(s) Topical daily  clotrimazole 1% Cream 1 Application(s) Topical two times a day  dextrose 5%. 1000 milliLiter(s) (50 mL/Hr) IV Continuous <Continuous>  dextrose 5%. 1000 milliLiter(s) (100 mL/Hr) IV Continuous <Continuous>  dextrose 50% Injectable 25 Gram(s) IV Push once  dextrose 50% Injectable 12.5 Gram(s) IV Push once  dextrose 50% Injectable 25 Gram(s) IV Push once  diphenhydrAMINE 50 milliGRAM(s) Oral <User Schedule>  folic acid 1 milliGRAM(s) Oral daily  glucagon  Injectable 1 milliGRAM(s) IntraMuscular once  insulin glargine Injectable (LANTUS) 32 Unit(s) SubCutaneous at bedtime  insulin lispro (ADMELOG) corrective regimen sliding scale   SubCutaneous three times a day before meals  insulin lispro (ADMELOG) corrective regimen sliding scale   SubCutaneous <User Schedule>  insulin lispro Injectable (ADMELOG) 16 Unit(s) SubCutaneous before lunch  insulin lispro Injectable (ADMELOG) 13 Unit(s) SubCutaneous before breakfast  insulin lispro Injectable (ADMELOG) 18 Unit(s) SubCutaneous before dinner  mexiletine 200 milliGRAM(s) Oral every 8 hours  multivitamin/minerals 1 Tablet(s) Oral daily  nystatin    Suspension 928140 Unit(s) Swish and Swallow two times a day  pantoprazole    Tablet 40 milliGRAM(s) Oral before breakfast  polyethylene glycol 3350 17 Gram(s) Oral two times a day  senna 2 Tablet(s) Oral at bedtime  sodium chloride 7% Inhalation 4 milliLiter(s) Inhalation every 6 hours  tiotropium 2.5 MICROgram(s) Inhaler 2 Puff(s) Inhalation daily  trimethoprim  160 mG/sulfamethoxazole 800 mG 1 Tablet(s) Oral daily    MEDICATIONS  (PRN):  acetaminophen     Tablet .. 650 milliGRAM(s) Oral every 6 hours PRN Temp greater or equal to 38C (100.4F), Mild Pain (1 - 3)  aluminum hydroxide/magnesium hydroxide/simethicone Suspension 30 milliLiter(s) Oral every 4 hours PRN Dyspepsia  dextrose Oral Gel 15 Gram(s) Oral once PRN Blood Glucose LESS THAN 70 milliGRAM(s)/deciliter  melatonin 3 milliGRAM(s) Oral at bedtime PRN Insomnia  ondansetron Injectable 4 milliGRAM(s) IV Push every 8 hours PRN Nausea and/or Vomiting      ALLERGIES:  Allergies    tetanus toxoid (Short breath)  cefepime (Anaphylaxis)  penicillin (Anaphylaxis)  Avelox (Short breath; Pruritus)  Dilaudid (Short breath)  codeine (Short breath)  aspirin (Short breath)  iodine (Short breath; Swelling)  Valium (Short breath)  shellfish (Anaphylaxis)    Intolerances        LABS:                        9.2    9.08  )-----------( 180      ( 01 Sep 2023 06:22 )             31.2     09-01    138  |  104  |  24<H>  ----------------------------<  356<H>  5.1   |  23  |  0.65    Ca    8.4      01 Sep 2023 06:20  Phos  3.5     09-01  Mg     2.1     09-01    TPro  5.7<L>  /  Alb  3.7  /  TBili  1.2  /  DBili  x   /  AST  55<H>  /  ALT  28  /  AlkPhos  62  09-01      Urinalysis Basic - ( 01 Sep 2023 06:20 )    Color: x / Appearance: x / SG: x / pH: x  Gluc: 356 mg/dL / Ketone: x  / Bili: x / Urobili: x   Blood: x / Protein: x / Nitrite: x   Leuk Esterase: x / RBC: x / WBC x   Sq Epi: x / Non Sq Epi: x / Bacteria: x      CAPILLARY BLOOD GLUCOSE      POCT Blood Glucose.: 250 mg/dL (02 Sep 2023 02:01)      RADIOLOGY & ADDITIONAL TESTS: Reviewed.

## 2023-09-02 NOTE — PROGRESS NOTE ADULT - PROBLEM SELECTOR PLAN 2
Acute Exacerbation of Bronchiectasis - Stable     - Initial CT showed increased bronchial secretions and wheezing managed with 7% saline and Q6 albuterol Neb  - MRSA + Sputum s/p treatment with Ertapenem -> Linezolid  - Off treatment dose antibiotics with just Bactrim Ppx  - Currently on solumedrol taper - 24mg decrease by 8mg/wk to 8mg  - Clinically improved - c/w Eliquis, Mexiletine  - EKG NSR 75, bifascicular block

## 2023-09-02 NOTE — PROGRESS NOTE ADULT - PROBLEM SELECTOR PROBLEM 2
[facility] Follow up [facility]    SUBJECTIVE    Vera Oneill is a pleasant 76 year old female who presents in the [facility]ascular Clinic today for follow up of recent hospitalization.  Earlier this year the patient underwent a hemorrhoidectomy on 10/01/2020.  On postop day 1 she was noted to have developed a pulmonary embolism.  She is status post IVC filter placed by Dr. Dallas given we could not anticoagulate due to her rectal bleeding causing significant anemia. Her hemoglobin ultimately stabilized and she was ultimately able to tolerate Eliquis which she was discharged on.   Today the patient endorses ongoing issues with anemia.  She required a blood transfusion yesterday.  She is following up with Dr. Barragan of Hematology-Oncology.  She is receiving IV iron supplementation.  She denies any bleeding including bloody urine or bloody stool.  She denies any chest pain shortness of breath or any other sign or symptom of present.    ALLERGIES AND MEDICATIONS  ALLERGIES:   Allergen Reactions   • Adhesive   (Environmental) Other (See Comments)     Stretchy tapes makes my skin pull off       Current Outpatient Medications   Medication Sig   • lovastatin (MEVACOR) 40 MG tablet TAKE 1 TABLET BY MOUTH NIGHTLY. LET DOCTOR KNOW IF MUSCLE TENDERNESS DEVELOPS.   • furosemide (LASIX) 40 MG tablet TAKE 1 TABLET BY MOUTH DAILY. MAY INCREASE TO 2 TABLETS DAILY AS NEEDED FOR LEG SWELLING OR WEIGHT GAIN.   • ASPIRIN 81 PO Take 81 mg by mouth daily.   • apixaBAN (ELIQUIS) 5 MG Tab Take 2 tablets by mouth 2 times daily for 7 days, THEN 1 tablet 2 times daily for 23 days.   • HYDROcodone-acetaminophen (NORCO) 5-325 MG per tablet Take 1 tablet by mouth every 6 hours as needed for Pain.   • losartan (COZAAR) 50 MG tablet Take 1 tablet by mouth daily.   • allopurinol (ZYLOPRIM) 300 MG tablet Take 1 tablet by mouth daily.   • gabapentin (NEURONTIN) 300 MG capsule Take 1 capsule by mouth nightly.   • omeprazole (PrilOSEC)  20 MG capsule Take 1 capsule by mouth daily.   • sotalol (BETAPACE) 80 MG tablet Take 0.5 tablets by mouth 2 times daily.   • amoxicillin (AMOXIL) 500 MG capsule Take 4 capsules by mouth as needed (Take 1 hour prior to dental procedures).   • hydroxychloroquine (Plaquenil) 200 MG tablet Take 1 tablet by mouth nightly. Indications: Rheumatoid Arthritis Hold medication for 2 weeks after surgery, then may resume at prior dosing (200 mg by mouth two times daily).   • predniSONE (DELTASONE) 10 MG tablet Take 1 tablet by mouth daily.   • docusate calcium (SURFAK) 240 MG capsule Take 1 capsule by mouth daily.   • lactulose (CHRONULAC) 10 GM/15ML solution Take 15 mLs by mouth 2 times daily as needed (Constipation). May work up to 30 ml twice a day if needed.   • aspirin 81 MG tablet Take 81 mg by mouth daily.   • Acetaminophen 500 MG Cap Take 500 mg by mouth every 6 hours as needed.    • cholecalciferol (VITAMIN D3) 1000 UNITS tablet Take 1 tablet by mouth daily. (Patient taking differently: Take 5,000 Units by mouth daily. )   • calcium carbonate (TUMS) 500 MG chewable tablet Chew 1 tablet by mouth as needed for Heartburn.      No current facility-administered medications for this visit.      Facility-Administered Medications Ordered in Other Visits   Medication   • diphenhydrAMINE (BENADRYL) injection 25 mg       PAST MEDICAL HISTORY  Past Medical History:   Diagnosis Date   • Ambulates with cane     intermittent use   • Arthritis     DJD   • Basal cell carcinoma     nose (2008)   • Broken Right arm 08/08/2017   • Colon polyps    • Esophageal reflux    • GERD (gastroesophageal reflux disease) 2011   • Gout    • Heartburn years   • Hypercholesteremia    • Hypertension years ago     on meds   • Injury of hip, left 01/01/2013    strain   • Melanoma (CMS/Formerly [facility]) 2013    Left upper thigh, excised   • Miscarriage x1   • Osteoporosis    • PMR (polymyalgia rheumatica) (CMS/Formerly [facility])    • Pneumonia    • Rheumatoid arthritis(714.0) 20+yrs     • Spinal stenosis 8yr ago   • Wears prescription eyeglasses        FAMILY/SOCIAL HISTORY  Reviewed and updated per clinic staff.    REVIEW OF SYSTEMS  A complete review of systems was performed and is otherwise negative unless as stated above.    OBJECTIVE    PHYSICAL EXAMINATION  VITAL SIGNS:  Blood pressure 132/70, pulse 60, height 5' 4\" (1.626 m), weight 83.9 kg.  GENERAL: Well developed. Well nourished.   EYES: Non-injected conjunctivae. No discharge. Pupils equal.   ENT: Ears and nose are atraumatic. Tongue is midline. Oropharynx is unremarkable.   RESPIRATORY: Normal breath sounds. No wheeze, rhonchi or rales are noted. Normal respiratory effort. No use of accessory muscles noted.    CARDIOVASCULAR:Regular rate and rhythm. No murmurs, gallops or rubs were appreciated.  GASTROINTESTINAL: Soft abdomen. Non-tender. Bowel sounds present. No abdominal masses, hepatomegaly or splenomegaly is noted  SKIN: No rashes, lesions or ulcers noted.   LOWER EXTREMITIES:  There is scattered bruising on the bilateral lower extremities.  No open sores or ulcers are noted.  The feet and toes are warm with adequate capillary refill bilaterally. No clubbing, cyanosis or significant edema was appreciated. No ulcers or gangrene or cellulitis.   NEUROLOGIC AND PSYCHIATRIC: Cranial nerves 2-12 grossly intact. Normal sensation.     LABS:    CBC  Recent Labs   Lab 11/16/20  1407 11/11/20  1235 11/04/20  1422   WBC 9.8 9.8 7.2   RBC 2.45* 2.62* 2.66*   HCT 22.8* 23.7* 24.7*   HGB 6.8* 7.0* 7.4*   * 420 322       COAGS  Recent Labs   Lab 10/20/20  0709 10/20/20  0140 10/19/20  1842 10/19/20  0957 10/12/20  1358 10/12/20  0726   INR  --   --   --  1.0 1.2 1.2   Prothrombin Time  --   --   --  11.2 12.7* 12.3*   PTT 60* 73* 56* 27  --  35*        BMP  Recent Labs   Lab 11/04/20  1422 10/20/20  0709 10/19/20  0547   Sodium 138 140 141   Potassium 3.9 3.7 4.1   Chloride 100 105 107   Glucose 185* 109* 105*   Calcium 9.1 8.5 8.3*    Carbon Dioxide 27 29 29   BUN 27* 18 16   Creatinine 0.92 0.89 0.88       VASCULAR IMAGING:  CT chest PE 10/04/2020:  IMPRESSION:  1. Small pulmonary emboli are seen within the right lung.    ASSESSMENT:    1. Pulmonary embolism without acute cor pulmonale, unspecified chronicity, unspecified pulmonary embolism type (CMS/HCC)    2. Anemia of chronic disease        RECOMMENDATIONS:  1. Patient seen and examined for follow-up of any embolism.  The patient was admitted to the hospital last month following hemorrhoidectomy surgery.  The patient was diagnosed with a pulmonary embolism on postop day 1 following a hemorrhoidectomy procedure with Dr. Proctor.  She was initiated on anticoagulation with Eliquis but had ongoing issues with rectal bleeding requiring hospital readmission.  Due to her blood loss anemia anticoagulation was held for several days at which time she had an IVC filter placed.  Her hemoglobin stabilized in the hospital and she was ultimately discharged on Eliquis which she continues to be on.  She has ongoing issues with anemia and follows with Hematology.  Her anemia is favored to be due to chronic disease related to her rheumatoid arthritis versus blood loss anemia.  She is undergoing IV iron transfusions.    As noted above the patient's pulmonary embolism was noted to be small.  Given her ongoing issues with anemia requiring blood transfusion and the small thrombus burden I would like to minimize the duration that the patient remains on anticoagulation if possible therefore we will plan for a repeat CT chest PE in 2 months at which time she will have completed 3 months of anticoagulation.  If her pulmonary embolism is seen to be resolved at that time we may consider discontinuation of Eliquis given she is a rather poor candidate for long-term anticoagulation.  If we are considering discontinuation of anticoagulation she will need to be seen by Dr. Dallas for IVC filter retrieval.     All questions  answered follow-up in 2 months or sooner if needed    Richmond Nicole PA-C   Supervised By Dr. Pink     Acute exacerbation of bronchiectasis Atrial fibrillation

## 2023-09-02 NOTE — PROGRESS NOTE ADULT - PROBLEM SELECTOR PLAN 4
Diabetes:  - On 25-30 lantus at home, and 7-20 mealtime as well  - Goal less than >200  - Initially had steroid induced hyperglycemia, now on taper   - Endo following and dosing insulin accordingly Diet: dash consistent carb  Dvt: on Eliquis  Dispo: home and home PT

## 2023-09-03 LAB
ALBUMIN SERPL ELPH-MCNC: 3.6 G/DL — SIGNIFICANT CHANGE UP (ref 3.3–5)
ALP SERPL-CCNC: 64 U/L — SIGNIFICANT CHANGE UP (ref 40–120)
ALT FLD-CCNC: 24 U/L — SIGNIFICANT CHANGE UP (ref 10–45)
ANION GAP SERPL CALC-SCNC: 10 MMOL/L — SIGNIFICANT CHANGE UP (ref 5–17)
AST SERPL-CCNC: 44 U/L — HIGH (ref 10–40)
BASOPHILS # BLD AUTO: 0.02 K/UL — SIGNIFICANT CHANGE UP (ref 0–0.2)
BASOPHILS NFR BLD AUTO: 0.1 % — SIGNIFICANT CHANGE UP (ref 0–2)
BILIRUB SERPL-MCNC: 0.7 MG/DL — SIGNIFICANT CHANGE UP (ref 0.2–1.2)
BUN SERPL-MCNC: 20 MG/DL — SIGNIFICANT CHANGE UP (ref 7–23)
CALCIUM SERPL-MCNC: 8.9 MG/DL — SIGNIFICANT CHANGE UP (ref 8.4–10.5)
CHLORIDE SERPL-SCNC: 105 MMOL/L — SIGNIFICANT CHANGE UP (ref 96–108)
CO2 SERPL-SCNC: 25 MMOL/L — SIGNIFICANT CHANGE UP (ref 22–31)
CREAT SERPL-MCNC: 0.7 MG/DL — SIGNIFICANT CHANGE UP (ref 0.5–1.3)
EGFR: 91 ML/MIN/1.73M2 — SIGNIFICANT CHANGE UP
EOSINOPHIL # BLD AUTO: 0.06 K/UL — SIGNIFICANT CHANGE UP (ref 0–0.5)
EOSINOPHIL NFR BLD AUTO: 0.4 % — SIGNIFICANT CHANGE UP (ref 0–6)
GLUCOSE BLDC GLUCOMTR-MCNC: 147 MG/DL — HIGH (ref 70–99)
GLUCOSE BLDC GLUCOMTR-MCNC: 157 MG/DL — HIGH (ref 70–99)
GLUCOSE BLDC GLUCOMTR-MCNC: 172 MG/DL — HIGH (ref 70–99)
GLUCOSE BLDC GLUCOMTR-MCNC: 225 MG/DL — HIGH (ref 70–99)
GLUCOSE BLDC GLUCOMTR-MCNC: 245 MG/DL — HIGH (ref 70–99)
GLUCOSE SERPL-MCNC: 152 MG/DL — HIGH (ref 70–99)
HCT VFR BLD CALC: 31.8 % — LOW (ref 34.5–45)
HGB BLD-MCNC: 9.2 G/DL — LOW (ref 11.5–15.5)
IMM GRANULOCYTES NFR BLD AUTO: 1.1 % — HIGH (ref 0–0.9)
LYMPHOCYTES # BLD AUTO: 0.92 K/UL — LOW (ref 1–3.3)
LYMPHOCYTES # BLD AUTO: 6.4 % — LOW (ref 13–44)
MAGNESIUM SERPL-MCNC: 2 MG/DL — SIGNIFICANT CHANGE UP (ref 1.6–2.6)
MCHC RBC-ENTMCNC: 23.3 PG — LOW (ref 27–34)
MCHC RBC-ENTMCNC: 28.9 GM/DL — LOW (ref 32–36)
MCV RBC AUTO: 80.5 FL — SIGNIFICANT CHANGE UP (ref 80–100)
MONOCYTES # BLD AUTO: 1.05 K/UL — HIGH (ref 0–0.9)
MONOCYTES NFR BLD AUTO: 7.3 % — SIGNIFICANT CHANGE UP (ref 2–14)
NEUTROPHILS # BLD AUTO: 12.12 K/UL — HIGH (ref 1.8–7.4)
NEUTROPHILS NFR BLD AUTO: 84.7 % — HIGH (ref 43–77)
NRBC # BLD: 2 /100 WBCS — HIGH (ref 0–0)
PHOSPHATE SERPL-MCNC: 2.9 MG/DL — SIGNIFICANT CHANGE UP (ref 2.5–4.5)
PLATELET # BLD AUTO: 224 K/UL — SIGNIFICANT CHANGE UP (ref 150–400)
POTASSIUM SERPL-MCNC: 4.5 MMOL/L — SIGNIFICANT CHANGE UP (ref 3.5–5.3)
POTASSIUM SERPL-SCNC: 4.5 MMOL/L — SIGNIFICANT CHANGE UP (ref 3.5–5.3)
PROT SERPL-MCNC: 5.8 G/DL — LOW (ref 6–8.3)
RBC # BLD: 3.95 M/UL — SIGNIFICANT CHANGE UP (ref 3.8–5.2)
RBC # FLD: 37 % — HIGH (ref 10.3–14.5)
SODIUM SERPL-SCNC: 140 MMOL/L — SIGNIFICANT CHANGE UP (ref 135–145)
WBC # BLD: 14.33 K/UL — HIGH (ref 3.8–10.5)
WBC # FLD AUTO: 14.33 K/UL — HIGH (ref 3.8–10.5)

## 2023-09-03 PROCEDURE — 99232 SBSQ HOSP IP/OBS MODERATE 35: CPT

## 2023-09-03 RX ADMIN — APIXABAN 5 MILLIGRAM(S): 2.5 TABLET, FILM COATED ORAL at 21:36

## 2023-09-03 RX ADMIN — MEXILETINE HYDROCHLORIDE 200 MILLIGRAM(S): 150 CAPSULE ORAL at 13:06

## 2023-09-03 RX ADMIN — Medication 1 TABLET(S): at 11:58

## 2023-09-03 RX ADMIN — INSULIN GLARGINE 32 UNIT(S): 100 INJECTION, SOLUTION SUBCUTANEOUS at 21:36

## 2023-09-03 RX ADMIN — Medication 4: at 13:03

## 2023-09-03 RX ADMIN — SENNA PLUS 2 TABLET(S): 8.6 TABLET ORAL at 21:36

## 2023-09-03 RX ADMIN — APIXABAN 5 MILLIGRAM(S): 2.5 TABLET, FILM COATED ORAL at 09:12

## 2023-09-03 RX ADMIN — Medication 18 UNIT(S): at 17:41

## 2023-09-03 RX ADMIN — SODIUM CHLORIDE 4 MILLILITER(S): 9 INJECTION INTRAMUSCULAR; INTRAVENOUS; SUBCUTANEOUS at 06:40

## 2023-09-03 RX ADMIN — POLYETHYLENE GLYCOL 3350 17 GRAM(S): 17 POWDER, FOR SOLUTION ORAL at 06:35

## 2023-09-03 RX ADMIN — Medication 2: at 09:10

## 2023-09-03 RX ADMIN — TIOTROPIUM BROMIDE 2 PUFF(S): 18 CAPSULE ORAL; RESPIRATORY (INHALATION) at 11:57

## 2023-09-03 RX ADMIN — Medication 2: at 17:40

## 2023-09-03 RX ADMIN — Medication 0.5 MILLIGRAM(S): at 06:36

## 2023-09-03 RX ADMIN — MEXILETINE HYDROCHLORIDE 200 MILLIGRAM(S): 150 CAPSULE ORAL at 06:35

## 2023-09-03 RX ADMIN — Medication 3 MILLILITER(S): at 17:42

## 2023-09-03 RX ADMIN — Medication 16 UNIT(S): at 13:04

## 2023-09-03 RX ADMIN — Medication 3 MILLILITER(S): at 11:57

## 2023-09-03 RX ADMIN — POLYETHYLENE GLYCOL 3350 17 GRAM(S): 17 POWDER, FOR SOLUTION ORAL at 17:13

## 2023-09-03 RX ADMIN — Medication 0.5 MILLIGRAM(S): at 17:12

## 2023-09-03 RX ADMIN — Medication 3 MILLILITER(S): at 06:35

## 2023-09-03 RX ADMIN — ATORVASTATIN CALCIUM 20 MILLIGRAM(S): 80 TABLET, FILM COATED ORAL at 21:36

## 2023-09-03 RX ADMIN — SODIUM CHLORIDE 4 MILLILITER(S): 9 INJECTION INTRAMUSCULAR; INTRAVENOUS; SUBCUTANEOUS at 17:42

## 2023-09-03 RX ADMIN — Medication 16 MILLIGRAM(S): at 06:36

## 2023-09-03 RX ADMIN — PANTOPRAZOLE SODIUM 40 MILLIGRAM(S): 20 TABLET, DELAYED RELEASE ORAL at 06:35

## 2023-09-03 RX ADMIN — Medication 500000 UNIT(S): at 06:36

## 2023-09-03 RX ADMIN — Medication 500000 UNIT(S): at 17:13

## 2023-09-03 RX ADMIN — Medication 13 UNIT(S): at 09:10

## 2023-09-03 RX ADMIN — Medication 1 MILLIGRAM(S): at 11:58

## 2023-09-03 RX ADMIN — CHLORHEXIDINE GLUCONATE 1 APPLICATION(S): 213 SOLUTION TOPICAL at 11:59

## 2023-09-03 RX ADMIN — SODIUM CHLORIDE 4 MILLILITER(S): 9 INJECTION INTRAMUSCULAR; INTRAVENOUS; SUBCUTANEOUS at 11:57

## 2023-09-03 RX ADMIN — MEXILETINE HYDROCHLORIDE 200 MILLIGRAM(S): 150 CAPSULE ORAL at 21:38

## 2023-09-03 NOTE — PROGRESS NOTE ADULT - PROBLEM SELECTOR PLAN 1
Anemia  - Anemia workup c/f hemolysis, heme and CTS consulted  - Heme suggests hemolytic 2/2 AV stenosis from TAVR or natural valve based on smears and darby neg.  - CTS consulted, not a candidate for open surgical repair  - SAFIA 8/30 per structural cardiology. 1u PRBC given beforehand.  - Valve severely stenotic on SAFIA, Gated cardiac CT to assess anatomy for TAV in JENNIFER procedure completed  - following cards rec. per Dr. Leahy Anemia  - Anemia workup c/f hemolysis, heme and CTS consulted  - Heme suggests hemolytic 2/2 AV stenosis from TAVR or natural valve based on smears and darby neg.  - CTS consulted, not a candidate for open surgical repair  - SAFIA 8/30 per structural cardiology. 1u PRBC given beforehand.  - Valve severely stenotic on SAFIA, Gated cardiac CT to assess anatomy for TAV in JENNIFER procedure completed  - following cards rec. per Dr. Leahy, scheduled for TAVR/MEL possibly this week

## 2023-09-03 NOTE — PROGRESS NOTE ADULT - ASSESSMENT
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 P/w SOB, found to have bronchial secretions on CT, admit to medicine for exacerbation of bronchiectasis. Sputum culture growing MRSA, completed 7 days linezolid and ertapenem. Anemic, likely hemolytic from TAVR according to heme. SAFIA completed 8/30. Gated CT per structural heart completed and plans for TAVR/MEL procedure per Structural heart team, likely next week.

## 2023-09-03 NOTE — PROGRESS NOTE ADULT - SUBJECTIVE AND OBJECTIVE BOX
CC: Patient is a 72y old  Female who presents with a chief complaint of sob (01 Sep 2023 16:21)      INTERVAL EVENTS:     SUBJECTIVE / INTERVAL HPI:     VITAL SIGNS:  Vital Signs Last 24 Hrs  T(C): 36.8 (02 Sep 2023 06:30), Max: 36.8 (01 Sep 2023 13:53)  T(F): 98.3 (02 Sep 2023 06:30), Max: 98.3 (02 Sep 2023 06:30)  HR: 77 (02 Sep 2023 06:30) (77 - 90)  BP: 97/66 (02 Sep 2023 06:30) (97/66 - 137/83)  BP(mean): --  RR: 18 (02 Sep 2023 06:30) (18 - 18)  SpO2: 100% (02 Sep 2023 06:30) (98% - 100%)    Parameters below as of 02 Sep 2023 06:30  Patient On (Oxygen Delivery Method): room air          09-01-23 @ 07:01  -  09-02-23 @ 07:00  --------------------------------------------------------  IN: 690 mL / OUT: 0 mL / NET: 690 mL        PHYSICAL EXAM:    General: NAD, eating breakfast  Eyes: PERRLA, EOMI, Sclera anicteric, HM/LP in L eye.   Cardiovascular: +S1/S2; RRR  Respiratory: CTA B/L; no W/R/R  Gastrointestinal: soft, NT/ND  Extremities: WWP; no edema, clubbing or cyanosis  Vascular: 2+ radial, DP/PT pulses B/L  Neurological: AAOx3; no focal deficits  Psych: Mood: "pretty good" Affect: Congruent and Appropriate     MEDICATIONS:  MEDICATIONS  (STANDING):  albuterol/ipratropium for Nebulization 3 milliLiter(s) Nebulizer every 6 hours  apixaban 5 milliGRAM(s) Oral every 12 hours  atorvastatin 20 milliGRAM(s) Oral at bedtime  buDESOnide    Inhalation Suspension 0.5 milliGRAM(s) Inhalation two times a day  chlorhexidine 4% Liquid 1 Application(s) Topical daily  clotrimazole 1% Cream 1 Application(s) Topical two times a day  dextrose 5%. 1000 milliLiter(s) (50 mL/Hr) IV Continuous <Continuous>  dextrose 5%. 1000 milliLiter(s) (100 mL/Hr) IV Continuous <Continuous>  dextrose 50% Injectable 25 Gram(s) IV Push once  dextrose 50% Injectable 12.5 Gram(s) IV Push once  dextrose 50% Injectable 25 Gram(s) IV Push once  diphenhydrAMINE 50 milliGRAM(s) Oral <User Schedule>  folic acid 1 milliGRAM(s) Oral daily  glucagon  Injectable 1 milliGRAM(s) IntraMuscular once  insulin glargine Injectable (LANTUS) 32 Unit(s) SubCutaneous at bedtime  insulin lispro (ADMELOG) corrective regimen sliding scale   SubCutaneous three times a day before meals  insulin lispro (ADMELOG) corrective regimen sliding scale   SubCutaneous <User Schedule>  insulin lispro Injectable (ADMELOG) 16 Unit(s) SubCutaneous before lunch  insulin lispro Injectable (ADMELOG) 13 Unit(s) SubCutaneous before breakfast  insulin lispro Injectable (ADMELOG) 18 Unit(s) SubCutaneous before dinner  mexiletine 200 milliGRAM(s) Oral every 8 hours  multivitamin/minerals 1 Tablet(s) Oral daily  nystatin    Suspension 175117 Unit(s) Swish and Swallow two times a day  pantoprazole    Tablet 40 milliGRAM(s) Oral before breakfast  polyethylene glycol 3350 17 Gram(s) Oral two times a day  senna 2 Tablet(s) Oral at bedtime  sodium chloride 7% Inhalation 4 milliLiter(s) Inhalation every 6 hours  tiotropium 2.5 MICROgram(s) Inhaler 2 Puff(s) Inhalation daily  trimethoprim  160 mG/sulfamethoxazole 800 mG 1 Tablet(s) Oral daily    MEDICATIONS  (PRN):  acetaminophen     Tablet .. 650 milliGRAM(s) Oral every 6 hours PRN Temp greater or equal to 38C (100.4F), Mild Pain (1 - 3)  aluminum hydroxide/magnesium hydroxide/simethicone Suspension 30 milliLiter(s) Oral every 4 hours PRN Dyspepsia  dextrose Oral Gel 15 Gram(s) Oral once PRN Blood Glucose LESS THAN 70 milliGRAM(s)/deciliter  melatonin 3 milliGRAM(s) Oral at bedtime PRN Insomnia  ondansetron Injectable 4 milliGRAM(s) IV Push every 8 hours PRN Nausea and/or Vomiting      ALLERGIES:  Allergies    tetanus toxoid (Short breath)  cefepime (Anaphylaxis)  penicillin (Anaphylaxis)  Avelox (Short breath; Pruritus)  Dilaudid (Short breath)  codeine (Short breath)  aspirin (Short breath)  iodine (Short breath; Swelling)  Valium (Short breath)  shellfish (Anaphylaxis)    Intolerances        LABS:                        9.2    9.08  )-----------( 180      ( 01 Sep 2023 06:22 )             31.2     09-01    138  |  104  |  24<H>  ----------------------------<  356<H>  5.1   |  23  |  0.65    Ca    8.4      01 Sep 2023 06:20  Phos  3.5     09-01  Mg     2.1     09-01    TPro  5.7<L>  /  Alb  3.7  /  TBili  1.2  /  DBili  x   /  AST  55<H>  /  ALT  28  /  AlkPhos  62  09-01      Urinalysis Basic - ( 01 Sep 2023 06:20 )    Color: x / Appearance: x / SG: x / pH: x  Gluc: 356 mg/dL / Ketone: x  / Bili: x / Urobili: x   Blood: x / Protein: x / Nitrite: x   Leuk Esterase: x / RBC: x / WBC x   Sq Epi: x / Non Sq Epi: x / Bacteria: x      CAPILLARY BLOOD GLUCOSE      POCT Blood Glucose.: 250 mg/dL (02 Sep 2023 02:01)      RADIOLOGY & ADDITIONAL TESTS: Reviewed.     INTERVAL HPI/OVERNIGHT EVENTS:    SUBJECTIVE: Patient seen and examined at bedside.         OBJECTIVE:    VITAL SIGNS:  ICU Vital Signs Last 24 Hrs  T(C): 36.7 (03 Sep 2023 07:10), Max: 36.9 (02 Sep 2023 21:35)  T(F): 98 (03 Sep 2023 07:10), Max: 98.4 (02 Sep 2023 21:35)  HR: 76 (03 Sep 2023 07:10) (75 - 78)  BP: 116/68 (03 Sep 2023 07:10) (116/68 - 137/84)  BP(mean): --  ABP: --  ABP(mean): --  RR: 18 (03 Sep 2023 07:10) (18 - 18)  SpO2: 99% (03 Sep 2023 07:10) (97% - 100%)    O2 Parameters below as of 03 Sep 2023 07:10  Patient On (Oxygen Delivery Method): room air            Plateau pressure:   P/F ratio:     09-02 @ 07:01  -  09-03 @ 07:00  --------------------------------------------------------  IN: 720 mL / OUT: 0 mL / NET: 720 mL      CAPILLARY BLOOD GLUCOSE      POCT Blood Glucose.: 157 mg/dL (03 Sep 2023 08:40)    PHYSICAL EXAM:    General: NAD, eating breakfast  Eyes: PERRLA, EOMI, Sclera anicteric  Cardiovascular: +S1/S2; RRR  Respiratory: CTA B/L; no W/R/R  Gastrointestinal: soft, NT/ND  Extremities: WWP; no edema, clubbing or cyanosis  Vascular: 2+ radial, DP/PT pulses B/L  Neurological: AAOx3; no focal deficits  Psych: Mood: appropriate     MEDICATIONS:  MEDICATIONS  (STANDING):  albuterol/ipratropium for Nebulization 3 milliLiter(s) Nebulizer every 6 hours  apixaban 5 milliGRAM(s) Oral every 12 hours  atorvastatin 20 milliGRAM(s) Oral at bedtime  buDESOnide    Inhalation Suspension 0.5 milliGRAM(s) Inhalation two times a day  chlorhexidine 4% Liquid 1 Application(s) Topical daily  clotrimazole 1% Cream 1 Application(s) Topical two times a day  dextrose 5%. 1000 milliLiter(s) (50 mL/Hr) IV Continuous <Continuous>  dextrose 5%. 1000 milliLiter(s) (100 mL/Hr) IV Continuous <Continuous>  dextrose 50% Injectable 25 Gram(s) IV Push once  dextrose 50% Injectable 12.5 Gram(s) IV Push once  dextrose 50% Injectable 25 Gram(s) IV Push once  folic acid 1 milliGRAM(s) Oral daily  glucagon  Injectable 1 milliGRAM(s) IntraMuscular once  insulin glargine Injectable (LANTUS) 32 Unit(s) SubCutaneous at bedtime  insulin lispro (ADMELOG) corrective regimen sliding scale   SubCutaneous three times a day before meals  insulin lispro (ADMELOG) corrective regimen sliding scale   SubCutaneous <User Schedule>  insulin lispro Injectable (ADMELOG) 18 Unit(s) SubCutaneous before dinner  insulin lispro Injectable (ADMELOG) 16 Unit(s) SubCutaneous before lunch  insulin lispro Injectable (ADMELOG) 13 Unit(s) SubCutaneous before breakfast  methylPREDNISolone 16 milliGRAM(s) Oral daily  mexiletine 200 milliGRAM(s) Oral every 8 hours  multivitamin/minerals 1 Tablet(s) Oral daily  nystatin    Suspension 255399 Unit(s) Swish and Swallow two times a day  pantoprazole    Tablet 40 milliGRAM(s) Oral before breakfast  polyethylene glycol 3350 17 Gram(s) Oral two times a day  senna 2 Tablet(s) Oral at bedtime  sodium chloride 7% Inhalation 4 milliLiter(s) Inhalation every 6 hours  tiotropium 2.5 MICROgram(s) Inhaler 2 Puff(s) Inhalation daily  trimethoprim  160 mG/sulfamethoxazole 800 mG 1 Tablet(s) Oral daily    MEDICATIONS  (PRN):  acetaminophen     Tablet .. 650 milliGRAM(s) Oral every 6 hours PRN Temp greater or equal to 38C (100.4F), Mild Pain (1 - 3)  aluminum hydroxide/magnesium hydroxide/simethicone Suspension 30 milliLiter(s) Oral every 4 hours PRN Dyspepsia  dextrose Oral Gel 15 Gram(s) Oral once PRN Blood Glucose LESS THAN 70 milliGRAM(s)/deciliter  melatonin 3 milliGRAM(s) Oral at bedtime PRN Insomnia  ondansetron Injectable 4 milliGRAM(s) IV Push every 8 hours PRN Nausea and/or Vomiting      LABS:                        9.2    14.33 )-----------( 224      ( 03 Sep 2023 09:03 )             31.8     09-03    140  |  105  |  20  ----------------------------<  152<H>  4.5   |  25  |  0.70    Ca    8.9      03 Sep 2023 09:03  Phos  2.9     09-03  Mg     2.0     09-03    TPro  5.8<L>  /  Alb  3.6  /  TBili  0.7  /  DBili  x   /  AST  44<H>  /  ALT  24  /  AlkPhos  64  09-03      Urinalysis Basic - ( 03 Sep 2023 09:03 )    Color: x / Appearance: x / SG: x / pH: x  Gluc: 152 mg/dL / Ketone: x  / Bili: x / Urobili: x   Blood: x / Protein: x / Nitrite: x   Leuk Esterase: x / RBC: x / WBC x   Sq Epi: x / Non Sq Epi: x / Bacteria: x        RADIOLOGY & ADDITIONAL TESTS: Reviewed.

## 2023-09-03 NOTE — PROGRESS NOTE ADULT - ATTENDING COMMENTS
72F  with history of bronchiectasis on chronic steroids, s/p surgery, allergic rhinitis,  recurrent PNA, PND, tracheomalacia s/p tracheoplasty, ESBL proteus infections (sputum), T2DM, paroxysmal A-fib on Eliquis, colorectal cancer many years ago status post colostomy presenting with shortness of breath for3 days. Prior issues with ESBL/Proteus/yeast in sputum culture and was treated with ertapenem/Benadryl/vancomycin/aztreonam and methylprednisolone. She was discharged home with a midline and completed a course of antibiotics ertapenem (last dose 06/25). She was also recently  treated for MRSA and pseudomonas in sputum and completed Tobra nebs and doxycycline.    Acute bronchiectasis- stable- on steroid taper. 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 SAFIA confirmed the severe AS. Appreciate input by structural cards. Will need replacement likely next week.   T2DM with hyperglycemia- goal BS less than 200- adjust insulin as needed    Rest of care per plan above  D/W Dr Atwood

## 2023-09-04 LAB
ACANTHOCYTES BLD QL SMEAR: SLIGHT — SIGNIFICANT CHANGE UP
ALBUMIN SERPL ELPH-MCNC: 3.7 G/DL — SIGNIFICANT CHANGE UP (ref 3.3–5)
ALP SERPL-CCNC: 63 U/L — SIGNIFICANT CHANGE UP (ref 40–120)
ALT FLD-CCNC: 21 U/L — SIGNIFICANT CHANGE UP (ref 10–45)
ANION GAP SERPL CALC-SCNC: 15 MMOL/L — SIGNIFICANT CHANGE UP (ref 5–17)
ANISOCYTOSIS BLD QL: SIGNIFICANT CHANGE UP
APTT BLD: 29.7 SEC — SIGNIFICANT CHANGE UP (ref 24.5–35.6)
AST SERPL-CCNC: 45 U/L — HIGH (ref 10–40)
BASOPHILS # BLD AUTO: 0 K/UL — SIGNIFICANT CHANGE UP (ref 0–0.2)
BASOPHILS NFR BLD AUTO: 0 % — SIGNIFICANT CHANGE UP (ref 0–2)
BILIRUB SERPL-MCNC: 0.6 MG/DL — SIGNIFICANT CHANGE UP (ref 0.2–1.2)
BUN SERPL-MCNC: 25 MG/DL — HIGH (ref 7–23)
CALCIUM SERPL-MCNC: 9.3 MG/DL — SIGNIFICANT CHANGE UP (ref 8.4–10.5)
CHLORIDE SERPL-SCNC: 103 MMOL/L — SIGNIFICANT CHANGE UP (ref 96–108)
CO2 SERPL-SCNC: 22 MMOL/L — SIGNIFICANT CHANGE UP (ref 22–31)
CREAT SERPL-MCNC: 0.96 MG/DL — SIGNIFICANT CHANGE UP (ref 0.5–1.3)
DACRYOCYTES BLD QL SMEAR: SLIGHT — SIGNIFICANT CHANGE UP
EGFR: 62 ML/MIN/1.73M2 — SIGNIFICANT CHANGE UP
EOSINOPHIL # BLD AUTO: 0.11 K/UL — SIGNIFICANT CHANGE UP (ref 0–0.5)
EOSINOPHIL NFR BLD AUTO: 0.9 % — SIGNIFICANT CHANGE UP (ref 0–6)
GLUCOSE BLDC GLUCOMTR-MCNC: 146 MG/DL — HIGH (ref 70–99)
GLUCOSE BLDC GLUCOMTR-MCNC: 167 MG/DL — HIGH (ref 70–99)
GLUCOSE BLDC GLUCOMTR-MCNC: 280 MG/DL — HIGH (ref 70–99)
GLUCOSE BLDC GLUCOMTR-MCNC: 319 MG/DL — HIGH (ref 70–99)
GLUCOSE SERPL-MCNC: 119 MG/DL — HIGH (ref 70–99)
GRAM STN FLD: SIGNIFICANT CHANGE UP
HCT VFR BLD CALC: 34 % — LOW (ref 34.5–45)
HGB BLD-MCNC: 9.8 G/DL — LOW (ref 11.5–15.5)
INR BLD: 1.11 RATIO — SIGNIFICANT CHANGE UP (ref 0.85–1.18)
LYMPHOCYTES # BLD AUTO: 2.78 K/UL — SIGNIFICANT CHANGE UP (ref 1–3.3)
LYMPHOCYTES # BLD AUTO: 22.6 % — SIGNIFICANT CHANGE UP (ref 13–44)
MACROCYTES BLD QL: SLIGHT — SIGNIFICANT CHANGE UP
MAGNESIUM SERPL-MCNC: 2 MG/DL — SIGNIFICANT CHANGE UP (ref 1.6–2.6)
MANUAL SMEAR VERIFICATION: SIGNIFICANT CHANGE UP
MCHC RBC-ENTMCNC: 23.9 PG — LOW (ref 27–34)
MCHC RBC-ENTMCNC: 28.8 GM/DL — LOW (ref 32–36)
MCV RBC AUTO: 82.9 FL — SIGNIFICANT CHANGE UP (ref 80–100)
MICROCYTES BLD QL: SIGNIFICANT CHANGE UP
MONOCYTES # BLD AUTO: 0.75 K/UL — SIGNIFICANT CHANGE UP (ref 0–0.9)
MONOCYTES NFR BLD AUTO: 6.1 % — SIGNIFICANT CHANGE UP (ref 2–14)
NEUTROPHILS # BLD AUTO: 8.65 K/UL — HIGH (ref 1.8–7.4)
NEUTROPHILS NFR BLD AUTO: 70.4 % — SIGNIFICANT CHANGE UP (ref 43–77)
NRBC # BLD: 8 /100 — HIGH (ref 0–0)
OVALOCYTES BLD QL SMEAR: SLIGHT — SIGNIFICANT CHANGE UP
PHOSPHATE SERPL-MCNC: 4.2 MG/DL — SIGNIFICANT CHANGE UP (ref 2.5–4.5)
PLAT MORPH BLD: NORMAL — SIGNIFICANT CHANGE UP
PLATELET # BLD AUTO: 246 K/UL — SIGNIFICANT CHANGE UP (ref 150–400)
POIKILOCYTOSIS BLD QL AUTO: SIGNIFICANT CHANGE UP
POLYCHROMASIA BLD QL SMEAR: SLIGHT — SIGNIFICANT CHANGE UP
POTASSIUM SERPL-MCNC: 4.2 MMOL/L — SIGNIFICANT CHANGE UP (ref 3.5–5.3)
POTASSIUM SERPL-SCNC: 4.2 MMOL/L — SIGNIFICANT CHANGE UP (ref 3.5–5.3)
PROT SERPL-MCNC: 5.7 G/DL — LOW (ref 6–8.3)
PROTHROM AB SERPL-ACNC: 11.6 SEC — SIGNIFICANT CHANGE UP (ref 9.5–13)
RBC # BLD: 4.1 M/UL — SIGNIFICANT CHANGE UP (ref 3.8–5.2)
RBC # FLD: 37 % — HIGH (ref 10.3–14.5)
RBC BLD AUTO: ABNORMAL
SCHISTOCYTES BLD QL AUTO: SIGNIFICANT CHANGE UP
SODIUM SERPL-SCNC: 140 MMOL/L — SIGNIFICANT CHANGE UP (ref 135–145)
SPECIMEN SOURCE: SIGNIFICANT CHANGE UP
TOXIC GRANULES BLD QL SMEAR: PRESENT — SIGNIFICANT CHANGE UP
WBC # BLD: 12.28 K/UL — HIGH (ref 3.8–10.5)
WBC # FLD AUTO: 12.28 K/UL — HIGH (ref 3.8–10.5)

## 2023-09-04 PROCEDURE — 99232 SBSQ HOSP IP/OBS MODERATE 35: CPT

## 2023-09-04 RX ORDER — HEPARIN SODIUM 5000 [USP'U]/ML
INJECTION INTRAVENOUS; SUBCUTANEOUS
Qty: 25000 | Refills: 0 | Status: DISCONTINUED | OUTPATIENT
Start: 2023-09-04 | End: 2023-09-06

## 2023-09-04 RX ORDER — HEPARIN SODIUM 5000 [USP'U]/ML
5000 INJECTION INTRAVENOUS; SUBCUTANEOUS EVERY 6 HOURS
Refills: 0 | Status: DISCONTINUED | OUTPATIENT
Start: 2023-09-04 | End: 2023-09-06

## 2023-09-04 RX ORDER — INSULIN LISPRO 100/ML
15 VIAL (ML) SUBCUTANEOUS
Refills: 0 | Status: DISCONTINUED | OUTPATIENT
Start: 2023-09-04 | End: 2023-09-06

## 2023-09-04 RX ORDER — HEPARIN SODIUM 5000 [USP'U]/ML
5000 INJECTION INTRAVENOUS; SUBCUTANEOUS ONCE
Refills: 0 | Status: DISCONTINUED | OUTPATIENT
Start: 2023-09-04 | End: 2023-09-04

## 2023-09-04 RX ORDER — HEPARIN SODIUM 5000 [USP'U]/ML
5000 INJECTION INTRAVENOUS; SUBCUTANEOUS EVERY 6 HOURS
Refills: 0 | Status: DISCONTINUED | OUTPATIENT
Start: 2023-09-04 | End: 2023-09-04

## 2023-09-04 RX ORDER — HEPARIN SODIUM 5000 [USP'U]/ML
2500 INJECTION INTRAVENOUS; SUBCUTANEOUS EVERY 6 HOURS
Refills: 0 | Status: DISCONTINUED | OUTPATIENT
Start: 2023-09-04 | End: 2023-09-06

## 2023-09-04 RX ORDER — HEPARIN SODIUM 5000 [USP'U]/ML
2500 INJECTION INTRAVENOUS; SUBCUTANEOUS EVERY 6 HOURS
Refills: 0 | Status: DISCONTINUED | OUTPATIENT
Start: 2023-09-04 | End: 2023-09-04

## 2023-09-04 RX ORDER — HEPARIN SODIUM 5000 [USP'U]/ML
INJECTION INTRAVENOUS; SUBCUTANEOUS
Qty: 25000 | Refills: 0 | Status: DISCONTINUED | OUTPATIENT
Start: 2023-09-04 | End: 2023-09-04

## 2023-09-04 RX ADMIN — Medication 500000 UNIT(S): at 17:47

## 2023-09-04 RX ADMIN — Medication 3 MILLILITER(S): at 06:24

## 2023-09-04 RX ADMIN — POLYETHYLENE GLYCOL 3350 17 GRAM(S): 17 POWDER, FOR SOLUTION ORAL at 06:25

## 2023-09-04 RX ADMIN — Medication 0.5 MILLIGRAM(S): at 06:23

## 2023-09-04 RX ADMIN — CHLORHEXIDINE GLUCONATE 1 APPLICATION(S): 213 SOLUTION TOPICAL at 12:17

## 2023-09-04 RX ADMIN — HEPARIN SODIUM 1100 UNIT(S)/HR: 5000 INJECTION INTRAVENOUS; SUBCUTANEOUS at 19:29

## 2023-09-04 RX ADMIN — TIOTROPIUM BROMIDE 2 PUFF(S): 18 CAPSULE ORAL; RESPIRATORY (INHALATION) at 12:02

## 2023-09-04 RX ADMIN — MEXILETINE HYDROCHLORIDE 200 MILLIGRAM(S): 150 CAPSULE ORAL at 21:26

## 2023-09-04 RX ADMIN — INSULIN GLARGINE 32 UNIT(S): 100 INJECTION, SOLUTION SUBCUTANEOUS at 22:45

## 2023-09-04 RX ADMIN — PANTOPRAZOLE SODIUM 40 MILLIGRAM(S): 20 TABLET, DELAYED RELEASE ORAL at 06:24

## 2023-09-04 RX ADMIN — Medication 1 TABLET(S): at 12:06

## 2023-09-04 RX ADMIN — Medication 8: at 13:00

## 2023-09-04 RX ADMIN — Medication 500000 UNIT(S): at 06:24

## 2023-09-04 RX ADMIN — Medication 1 MILLIGRAM(S): at 12:06

## 2023-09-04 RX ADMIN — Medication 3 MILLILITER(S): at 17:51

## 2023-09-04 RX ADMIN — Medication 13 UNIT(S): at 09:10

## 2023-09-04 RX ADMIN — SODIUM CHLORIDE 4 MILLILITER(S): 9 INJECTION INTRAMUSCULAR; INTRAVENOUS; SUBCUTANEOUS at 17:51

## 2023-09-04 RX ADMIN — HEPARIN SODIUM 1100 UNIT(S)/HR: 5000 INJECTION INTRAVENOUS; SUBCUTANEOUS at 18:28

## 2023-09-04 RX ADMIN — Medication 16 UNIT(S): at 13:02

## 2023-09-04 RX ADMIN — SENNA PLUS 2 TABLET(S): 8.6 TABLET ORAL at 21:26

## 2023-09-04 RX ADMIN — SODIUM CHLORIDE 4 MILLILITER(S): 9 INJECTION INTRAMUSCULAR; INTRAVENOUS; SUBCUTANEOUS at 12:01

## 2023-09-04 RX ADMIN — Medication 2: at 22:45

## 2023-09-04 RX ADMIN — ATORVASTATIN CALCIUM 20 MILLIGRAM(S): 80 TABLET, FILM COATED ORAL at 21:26

## 2023-09-04 RX ADMIN — APIXABAN 5 MILLIGRAM(S): 2.5 TABLET, FILM COATED ORAL at 11:11

## 2023-09-04 RX ADMIN — MEXILETINE HYDROCHLORIDE 200 MILLIGRAM(S): 150 CAPSULE ORAL at 13:47

## 2023-09-04 RX ADMIN — Medication 0.5 MILLIGRAM(S): at 17:46

## 2023-09-04 RX ADMIN — Medication 16 MILLIGRAM(S): at 06:24

## 2023-09-04 RX ADMIN — Medication 1 TABLET(S): at 12:05

## 2023-09-04 RX ADMIN — SODIUM CHLORIDE 4 MILLILITER(S): 9 INJECTION INTRAMUSCULAR; INTRAVENOUS; SUBCUTANEOUS at 06:25

## 2023-09-04 RX ADMIN — Medication 3 MILLILITER(S): at 12:01

## 2023-09-04 RX ADMIN — MEXILETINE HYDROCHLORIDE 200 MILLIGRAM(S): 150 CAPSULE ORAL at 06:24

## 2023-09-04 NOTE — CHART NOTE - NSCHARTNOTEFT_GEN_A_CORE
Age: 72y    Gender: Female    POCT Blood Glucose:  319 mg/dL (09-04-23 @ 12:53)  146 mg/dL (09-04-23 @ 08:40)  167 mg/dL (09-04-23 @ 01:31)  147 mg/dL (09-03-23 @ 21:08)  172 mg/dL (09-03-23 @ 17:27)      eMAR:atorvastatin   20 milliGRAM(s) Oral (09-03-23 @ 21:36)    insulin glargine Injectable (LANTUS)   32 Unit(s) SubCutaneous (09-03-23 @ 21:36)    insulin lispro (ADMELOG) corrective regimen sliding scale   8 Unit(s) SubCutaneous (09-04-23 @ 13:00)   2 Unit(s) SubCutaneous (09-03-23 @ 17:40)    insulin lispro Injectable (ADMELOG)   13 Unit(s) SubCutaneous (09-04-23 @ 09:10)    insulin lispro Injectable (ADMELOG)   16 Unit(s) SubCutaneous (09-04-23 @ 13:02)    insulin lispro Injectable (ADMELOG)   18 Unit(s) SubCutaneous (09-03-23 @ 17:41)    methylPREDNISolone   16 milliGRAM(s) Oral (09-04-23 @ 06:24)     POC glucose, insulin requirements, lab values reviewed.  noted AC lunch bg remains elevated, adjust breakfast admelog to 15 units before breakfast starting tomorrow

## 2023-09-04 NOTE — PROGRESS NOTE ADULT - PROBLEM SELECTOR PLAN 4
Hospital Bundle    Fluids: PO  Electrolytes: Replete K > 4, Mg > 2, Phos > 3  Nutrition: Diet dash consistent carb  PPX  ---VTE: on Eliquis  ---GI: on PPI, bowel reg  ---Resp: n/a  Access: PIV  Code Status: FULL  Dispo: Home w/ Home PT pending further workup.

## 2023-09-04 NOTE — PROGRESS NOTE ADULT - PROBLEM SELECTOR PLAN 4
Diet: dash consistent carb  Dvt: on Eliquis  Dispo: home and home PT Hospital Bundle    Fluids: PO  Electrolytes: Replete K > 4, Mg > 2, Phos > 3  Nutrition: Diet dash consistent carb  PPX  ---VTE: on Eliquis  ---GI: on PPI, bowel reg  ---Resp: n/a  Access: PIV  Code Status: FULL  Dispo: Home w/ Home PT pending further workup.

## 2023-09-04 NOTE — CHART NOTE - NSCHARTNOTESELECT_GEN_ALL_CORE
endocrine/Event Note
Endocrine/Event Note
Endocrinology
Event Note
Nutrition Services
Wound Care Team Note:/Event Note
hematology sign off

## 2023-09-04 NOTE — PROGRESS NOTE ADULT - PROBLEM SELECTOR PLAN 1
- Anemia workup c/f hemolysis, heme and CTS consulted       - Heme: hemolytic 2/2 AV stenosis from TAVR or natural valve based on smears and darby neg.       - CTS: not a candidate for open surgical repair  - SAFIA 8/30 per structural cardiology -> Valve severely stenotic   - Gated cardiac CT to assess anatomy for TAV in JENNIFER procedure  - 2u pRBC total over course of admission   > following cards rec. per Dr. Leahy, scheduled for TAVR/MEL possibly this week

## 2023-09-04 NOTE — PROGRESS NOTE ADULT - ATTENDING COMMENTS
72F  with history of bronchiectasis on chronic steroids, s/p surgery, allergic rhinitis,  recurrent PNA, PND, tracheomalacia s/p tracheoplasty, ESBL proteus infections (sputum), T2DM, paroxysmal A-fib on Eliquis, colorectal cancer many years ago status post colostomy presenting with shortness of breath for3 days. Prior issues with ESBL/Proteus/yeast in sputum culture and was treated with ertapenem/Benadryl/vancomycin/aztreonam and methylprednisolone. She was discharged home with a midline and completed a course of antibiotics ertapenem (last dose 06/25). She was also recently  treated for MRSA and pseudomonas in sputum and completed Tobra nebs and doxycycline.    Acute bronchiectasis- stable- on steroid taper. 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 SAFIA confirmed the severe AS. Appreciate input by structural cards. Will need replacement likely this week.   On Eliquis for Afib, transitioned to heparin gtt in anticipation of surgery this week. Monitor PTT  T2DM with hyperglycemia- goal BS less than 200- adjust insulin as needed    Rest of care per plan above  D/W Dr Melgar

## 2023-09-04 NOTE — PROGRESS NOTE ADULT - SUBJECTIVE AND OBJECTIVE BOX
CC: Patient is a 72y old  Female who presents with a chief complaint of sob (04 Sep 2023 10:19)      INTERVAL EVENTS: ERIKA    SUBJECTIVE / INTERVAL HPI: Patient seen and examined at bedside. Resting comfortably    ROS: Denies fever, denies nausea, denies vomiting    VITAL SIGNS:  Vital Signs Last 24 Hrs  T(C): 36.9 (04 Sep 2023 06:05), Max: 36.9 (03 Sep 2023 21:18)  T(F): 98.5 (04 Sep 2023 06:05), Max: 98.5 (03 Sep 2023 21:18)  HR: 79 (04 Sep 2023 06:05) (70 - 81)  BP: 105/66 (04 Sep 2023 06:05) (105/66 - 115/62)  BP(mean): --  RR: 18 (04 Sep 2023 06:05) (18 - 18)  SpO2: 98% (04 Sep 2023 06:05) (98% - 100%)    Parameters below as of 04 Sep 2023 06:05  Patient On (Oxygen Delivery Method): room air          09-03-23 @ 07:01  -  09-04-23 @ 07:00  --------------------------------------------------------  IN: 240 mL / OUT: 0 mL / NET: 240 mL        PHYSICAL EXAM:    General: NAD  Eyes: PERRLA, EOMI, Sclera anicteric   Cardiovascular: +S1/S2; Harsh 3/6 systolic murmur appreciated   Respiratory: CTA B/L; no W/R/R  Gastrointestinal: soft, NT/ND, no drainage around site   Neurological: AAOx3; no focal deficits  Psych: Mood: "I'm feeling sleepy" Affect: Congruent and Appropriate     MEDICATIONS:  MEDICATIONS  (STANDING):  albuterol/ipratropium for Nebulization 3 milliLiter(s) Nebulizer every 6 hours  apixaban 5 milliGRAM(s) Oral every 12 hours  atorvastatin 20 milliGRAM(s) Oral at bedtime  buDESOnide    Inhalation Suspension 0.5 milliGRAM(s) Inhalation two times a day  chlorhexidine 4% Liquid 1 Application(s) Topical daily  clotrimazole 1% Cream 1 Application(s) Topical two times a day  dextrose 5%. 1000 milliLiter(s) (100 mL/Hr) IV Continuous <Continuous>  dextrose 5%. 1000 milliLiter(s) (50 mL/Hr) IV Continuous <Continuous>  dextrose 50% Injectable 25 Gram(s) IV Push once  dextrose 50% Injectable 12.5 Gram(s) IV Push once  dextrose 50% Injectable 25 Gram(s) IV Push once  folic acid 1 milliGRAM(s) Oral daily  glucagon  Injectable 1 milliGRAM(s) IntraMuscular once  insulin glargine Injectable (LANTUS) 32 Unit(s) SubCutaneous at bedtime  insulin lispro (ADMELOG) corrective regimen sliding scale   SubCutaneous three times a day before meals  insulin lispro (ADMELOG) corrective regimen sliding scale   SubCutaneous <User Schedule>  insulin lispro Injectable (ADMELOG) 18 Unit(s) SubCutaneous before dinner  insulin lispro Injectable (ADMELOG) 16 Unit(s) SubCutaneous before lunch  insulin lispro Injectable (ADMELOG) 13 Unit(s) SubCutaneous before breakfast  methylPREDNISolone 16 milliGRAM(s) Oral daily  mexiletine 200 milliGRAM(s) Oral every 8 hours  multivitamin/minerals 1 Tablet(s) Oral daily  nystatin    Suspension 284839 Unit(s) Swish and Swallow two times a day  pantoprazole    Tablet 40 milliGRAM(s) Oral before breakfast  polyethylene glycol 3350 17 Gram(s) Oral two times a day  senna 2 Tablet(s) Oral at bedtime  sodium chloride 7% Inhalation 4 milliLiter(s) Inhalation every 6 hours  tiotropium 2.5 MICROgram(s) Inhaler 2 Puff(s) Inhalation daily  trimethoprim  160 mG/sulfamethoxazole 800 mG 1 Tablet(s) Oral daily    MEDICATIONS  (PRN):  acetaminophen     Tablet .. 650 milliGRAM(s) Oral every 6 hours PRN Temp greater or equal to 38C (100.4F), Mild Pain (1 - 3)  aluminum hydroxide/magnesium hydroxide/simethicone Suspension 30 milliLiter(s) Oral every 4 hours PRN Dyspepsia  dextrose Oral Gel 15 Gram(s) Oral once PRN Blood Glucose LESS THAN 70 milliGRAM(s)/deciliter  melatonin 3 milliGRAM(s) Oral at bedtime PRN Insomnia  ondansetron Injectable 4 milliGRAM(s) IV Push every 8 hours PRN Nausea and/or Vomiting      ALLERGIES:  Allergies    tetanus toxoid (Short breath)  cefepime (Anaphylaxis)  penicillin (Anaphylaxis)  Avelox (Short breath; Pruritus)  Dilaudid (Short breath)  codeine (Short breath)  aspirin (Short breath)  iodine (Short breath; Swelling)  Valium (Short breath)  shellfish (Anaphylaxis)    Intolerances        LABS:                        9.8    12.28 )-----------( 246      ( 04 Sep 2023 06:56 )             34.0     09-04    140  |  103  |  25<H>  ----------------------------<  119<H>  4.2   |  22  |  0.96    Ca    9.3      04 Sep 2023 06:55  Phos  4.2     09-04  Mg     2.0     09-04    TPro  5.7<L>  /  Alb  3.7  /  TBili  0.6  /  DBili  x   /  AST  45<H>  /  ALT  21  /  AlkPhos  63  09-04      Urinalysis Basic - ( 04 Sep 2023 06:55 )    Color: x / Appearance: x / SG: x / pH: x  Gluc: 119 mg/dL / Ketone: x  / Bili: x / Urobili: x   Blood: x / Protein: x / Nitrite: x   Leuk Esterase: x / RBC: x / WBC x   Sq Epi: x / Non Sq Epi: x / Bacteria: x      CAPILLARY BLOOD GLUCOSE      POCT Blood Glucose.: 146 mg/dL (04 Sep 2023 08:40)      RADIOLOGY & ADDITIONAL TESTS: Reviewed.

## 2023-09-04 NOTE — PROGRESS NOTE ADULT - ASSESSMENT
72F hx bronchiectasis, trachomalacia on chronic steroids, DM, pAfib on Eliquis, colorectal ca yrs ago s/p colostomy, recent admission for parainfluenza PNA on various antibiotics. admitted to medicine for exacerbation of bronchiectasis that has since resolved s/p completed 7 days linezolid and ertapenem. Anemic, likely hemolytic from severe aortic stenosis according to heme. SAFIA completed 8/30. Gated CT per structural heart completed and plans for TAVR/MEL procedure per Structural heart team this week.

## 2023-09-04 NOTE — PROGRESS NOTE ADULT - ATTENDING COMMENTS
72F  with history of bronchiectasis on chronic steroids, s/p surgery, allergic rhinitis,  recurrent PNA, PND, tracheomalacia s/p tracheoplasty, ESBL proteus infections (sputum), T2DM, paroxysmal A-fib on Eliquis, colorectal cancer many years ago status post colostomy presenting with shortness of breath for3 days. Prior issues with ESBL/Proteus/yeast in sputum culture and was treated with ertapenem/Benadryl/vancomycin/aztreonam and methylprednisolone. She was discharged home with a midline and completed a course of antibiotics ertapenem (last dose 06/25). She was also recently  treated for MRSA and pseudomonas in sputum and completed Tobra nebs and doxycycline.    Acute bronchiectasis- stable- on steroid taper. 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 SAFIA confirmed the severe AS. Appreciate input by structural cards. Will need replacement likely this week.   T2DM with hyperglycemia- goal BS less than 200- adjust insulin as needed    Rest of care per plan above  D/W Dr Melgar 72F  with history of bronchiectasis on chronic steroids, s/p surgery, allergic rhinitis,  recurrent PNA, PND, tracheomalacia s/p tracheoplasty, ESBL proteus infections (sputum), T2DM, paroxysmal A-fib on Eliquis, colorectal cancer many years ago status post colostomy presenting with shortness of breath for3 days. Prior issues with ESBL/Proteus/yeast in sputum culture and was treated with ertapenem/Benadryl/vancomycin/aztreonam and methylprednisolone. She was discharged home with a midline and completed a course of antibiotics ertapenem (last dose 06/25). She was also recently  treated for MRSA and pseudomonas in sputum and completed Tobra nebs and doxycycline.    Acute bronchiectasis- stable- on steroid taper. 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 SAFIA confirmed the severe AS. Appreciate input by structural cards. Will need replacement likely this week.   On Eliquis for Afib, transitioned to heparin gtt in anticipation of surgery this week. Monitor PTT  T2DM with hyperglycemia- goal BS less than 200- adjust insulin as needed    Rest of care per plan above  D/W Dr Melgar 72F  with history of bronchiectasis on chronic steroids, s/p surgery, allergic rhinitis,  recurrent PNA, PND, tracheomalacia s/p tracheoplasty, ESBL proteus infections (sputum), T2DM, paroxysmal A-fib on Eliquis, colorectal cancer many years ago status post colostomy presenting with shortness of breath for3 days. Prior issues with ESBL/Proteus/yeast in sputum culture and was treated with ertapenem/Benadryl/vancomycin/aztreonam and methylprednisolone. She was discharged home with a midline and completed a course of antibiotics ertapenem (last dose 06/25). She was also recently  treated for MRSA and pseudomonas in sputum and completed Tobra nebs and doxycycline.    Acute bronchiectasis- stable- on steroid taper. 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 SAFIA confirmed the severe AS. Appreciate input by structural cards. Will need replacement likely this week.   On Eliquis for Afib, transitioned to heparin gtt in anticipation of surgery this week. Monitor PTT  T2DM with hyperglycemia- goal BS less than 200- adjust insulin as needed

## 2023-09-04 NOTE — PROGRESS NOTE ADULT - PROBLEM SELECTOR PLAN 3
Diabetes:  - On 25-30 lantus at home, and 7-20 mealtime as well  - Goal less than >200  - Initially had steroid induced hyperglycemia, now on taper   - Endo following and dosing insulin accordingly - Goal less than >200  - Initially had steroid induced hyperglycemia, now on taper   > Endo following and dosing insulin accordingly  > On 25-30 lantus at home, and 7-20 mealtime as well

## 2023-09-04 NOTE — PROGRESS NOTE ADULT - ASSESSMENT
72F hx bronchiectasis, trachomalacia on chronic steroids, DM, pAfib on Eliquis, colorectal ca yrs ago s/p colostomy, recent admission for parainfluenza PNA on various antibiotics. admitted to medicine for exacerbation of bronchiectasis that has since resolved s/p completed 7 days linezolid and ertapenem. Anemic, likely hemolytic from severe aortic stenosis according to heme. SAFAI completed 8/30. Gated CT per structural heart completed and plans for TAVR/MEL procedure per Structural heart team this week.

## 2023-09-04 NOTE — PROGRESS NOTE ADULT - PROBLEM SELECTOR PLAN 1
Anemia  - Anemia workup c/f hemolysis, heme and CTS consulted       - Heme: hemolytic 2/2 AV stenosis from TAVR or natural valve based on smears and darby neg.       - CTS: not a candidate for open surgical repair  - SAFIA 8/30 per structural cardiology -> Valve severely stenotic   - Gated cardiac CT to assess anatomy for TAV in JENNIFER procedure  - 2u pRBC total over course of admission   - following cards rec. per Dr. Leahy, scheduled for TAVR/MEL possibly this week - Anemia workup c/f hemolysis, heme and CTS consulted       - Heme: hemolytic 2/2 AV stenosis from TAVR or natural valve based on smears and darby neg.       - CTS: not a candidate for open surgical repair  - SAFIA 8/30 per structural cardiology -> Valve severely stenotic   - Gated cardiac CT to assess anatomy for TAV in JENNIFER procedure  - 2u pRBC total over course of admission   > following cards rec. per Dr. Leahy, scheduled for TAVR/MEL possibly this week

## 2023-09-04 NOTE — PROGRESS NOTE ADULT - SUBJECTIVE AND OBJECTIVE BOX
CC: Patient is a 72y old  Female who presents with a chief complaint of sob (04 Sep 2023 10:19)      INTERVAL EVENTS: ERIKA    SUBJECTIVE / INTERVAL HPI: Patient seen and examined at bedside. Resting comfortably    ROS: Denies fever, denies nausea, denies vomiting    VITAL SIGNS:  Vital Signs Last 24 Hrs  T(C): 36.9 (04 Sep 2023 06:05), Max: 36.9 (03 Sep 2023 21:18)  T(F): 98.5 (04 Sep 2023 06:05), Max: 98.5 (03 Sep 2023 21:18)  HR: 79 (04 Sep 2023 06:05) (70 - 81)  BP: 105/66 (04 Sep 2023 06:05) (105/66 - 115/62)  BP(mean): --  RR: 18 (04 Sep 2023 06:05) (18 - 18)  SpO2: 98% (04 Sep 2023 06:05) (98% - 100%)    Parameters below as of 04 Sep 2023 06:05  Patient On (Oxygen Delivery Method): room air          09-03-23 @ 07:01  -  09-04-23 @ 07:00  --------------------------------------------------------  IN: 240 mL / OUT: 0 mL / NET: 240 mL        PHYSICAL EXAM:    General: NAD  Eyes: PERRLA, EOMI, Sclera anicteric   Cardiovascular: +S1/S2; Harsh 3/6 systolic murmur appreciated   Respiratory: CTA B/L; no W/R/R  Gastrointestinal: soft, NT/ND, no drainage around site   Neurological: AAOx3; no focal deficits  Psych: Mood: "I'm feeling sleepy" Affect: Congruent and Appropriate     MEDICATIONS:  MEDICATIONS  (STANDING):  albuterol/ipratropium for Nebulization 3 milliLiter(s) Nebulizer every 6 hours  apixaban 5 milliGRAM(s) Oral every 12 hours  atorvastatin 20 milliGRAM(s) Oral at bedtime  buDESOnide    Inhalation Suspension 0.5 milliGRAM(s) Inhalation two times a day  chlorhexidine 4% Liquid 1 Application(s) Topical daily  clotrimazole 1% Cream 1 Application(s) Topical two times a day  dextrose 5%. 1000 milliLiter(s) (100 mL/Hr) IV Continuous <Continuous>  dextrose 5%. 1000 milliLiter(s) (50 mL/Hr) IV Continuous <Continuous>  dextrose 50% Injectable 25 Gram(s) IV Push once  dextrose 50% Injectable 12.5 Gram(s) IV Push once  dextrose 50% Injectable 25 Gram(s) IV Push once  folic acid 1 milliGRAM(s) Oral daily  glucagon  Injectable 1 milliGRAM(s) IntraMuscular once  insulin glargine Injectable (LANTUS) 32 Unit(s) SubCutaneous at bedtime  insulin lispro (ADMELOG) corrective regimen sliding scale   SubCutaneous three times a day before meals  insulin lispro (ADMELOG) corrective regimen sliding scale   SubCutaneous <User Schedule>  insulin lispro Injectable (ADMELOG) 18 Unit(s) SubCutaneous before dinner  insulin lispro Injectable (ADMELOG) 16 Unit(s) SubCutaneous before lunch  insulin lispro Injectable (ADMELOG) 13 Unit(s) SubCutaneous before breakfast  methylPREDNISolone 16 milliGRAM(s) Oral daily  mexiletine 200 milliGRAM(s) Oral every 8 hours  multivitamin/minerals 1 Tablet(s) Oral daily  nystatin    Suspension 056707 Unit(s) Swish and Swallow two times a day  pantoprazole    Tablet 40 milliGRAM(s) Oral before breakfast  polyethylene glycol 3350 17 Gram(s) Oral two times a day  senna 2 Tablet(s) Oral at bedtime  sodium chloride 7% Inhalation 4 milliLiter(s) Inhalation every 6 hours  tiotropium 2.5 MICROgram(s) Inhaler 2 Puff(s) Inhalation daily  trimethoprim  160 mG/sulfamethoxazole 800 mG 1 Tablet(s) Oral daily    MEDICATIONS  (PRN):  acetaminophen     Tablet .. 650 milliGRAM(s) Oral every 6 hours PRN Temp greater or equal to 38C (100.4F), Mild Pain (1 - 3)  aluminum hydroxide/magnesium hydroxide/simethicone Suspension 30 milliLiter(s) Oral every 4 hours PRN Dyspepsia  dextrose Oral Gel 15 Gram(s) Oral once PRN Blood Glucose LESS THAN 70 milliGRAM(s)/deciliter  melatonin 3 milliGRAM(s) Oral at bedtime PRN Insomnia  ondansetron Injectable 4 milliGRAM(s) IV Push every 8 hours PRN Nausea and/or Vomiting      ALLERGIES:  Allergies    tetanus toxoid (Short breath)  cefepime (Anaphylaxis)  penicillin (Anaphylaxis)  Avelox (Short breath; Pruritus)  Dilaudid (Short breath)  codeine (Short breath)  aspirin (Short breath)  iodine (Short breath; Swelling)  Valium (Short breath)  shellfish (Anaphylaxis)    Intolerances        LABS:                        9.8    12.28 )-----------( 246      ( 04 Sep 2023 06:56 )             34.0     09-04    140  |  103  |  25<H>  ----------------------------<  119<H>  4.2   |  22  |  0.96    Ca    9.3      04 Sep 2023 06:55  Phos  4.2     09-04  Mg     2.0     09-04    TPro  5.7<L>  /  Alb  3.7  /  TBili  0.6  /  DBili  x   /  AST  45<H>  /  ALT  21  /  AlkPhos  63  09-04      Urinalysis Basic - ( 04 Sep 2023 06:55 )    Color: x / Appearance: x / SG: x / pH: x  Gluc: 119 mg/dL / Ketone: x  / Bili: x / Urobili: x   Blood: x / Protein: x / Nitrite: x   Leuk Esterase: x / RBC: x / WBC x   Sq Epi: x / Non Sq Epi: x / Bacteria: x      CAPILLARY BLOOD GLUCOSE      POCT Blood Glucose.: 146 mg/dL (04 Sep 2023 08:40)      RADIOLOGY & ADDITIONAL TESTS: Reviewed. CC: Patient is a 72y old  Female who presents with a chief complaint of sob (04 Sep 2023 10:19)      INTERVAL EVENTS: ERIKA    SUBJECTIVE / INTERVAL HPI: Patient seen and examined at bedside.     ROS: Denies fever, denies nausea, denies vomiting    VITAL SIGNS:  Vital Signs Last 24 Hrs  T(C): 36.7 (05 Sep 2023 11:55), Max: 36.9 (04 Sep 2023 14:40)  T(F): 98.1 (05 Sep 2023 11:55), Max: 98.4 (04 Sep 2023 14:40)  HR: 82 (05 Sep 2023 11:55) (76 - 89)  BP: 105/65 (05 Sep 2023 11:55) (105/65 - 145/72)  BP(mean): --  RR: 18 (05 Sep 2023 11:55) (16 - 18)  SpO2: 98% (05 Sep 2023 11:55) (98% - 98%)    Parameters below as of 05 Sep 2023 11:55  Patient On (Oxygen Delivery Method): room air            PHYSICAL EXAM:    General: NAD  Eyes: PERRLA, EOMI, Sclera anicteric   Cardiovascular: +S1/S2; RRR  Respiratory: CTA B/L; no W/R/R  Gastrointestinal: soft, NT/ND  Extremities: WWP; no edema, clubbing or cyanosis  Vascular: 2+ radial, DP/PT pulses B/L  Neurological: AAOx3; no focal deficits  Psych: Mood: Affect: Congruent and Appropriate     MEDICATIONS:  MEDICATIONS  (STANDING):  albuterol/ipratropium for Nebulization 3 milliLiter(s) Nebulizer every 6 hours  atorvastatin 20 milliGRAM(s) Oral at bedtime  buDESOnide    Inhalation Suspension 0.5 milliGRAM(s) Inhalation two times a day  chlorhexidine 4% Liquid 1 Application(s) Topical daily  clotrimazole 1% Cream 1 Application(s) Topical two times a day  dextrose 5%. 1000 milliLiter(s) (100 mL/Hr) IV Continuous <Continuous>  dextrose 5%. 1000 milliLiter(s) (50 mL/Hr) IV Continuous <Continuous>  dextrose 50% Injectable 25 Gram(s) IV Push once  dextrose 50% Injectable 12.5 Gram(s) IV Push once  dextrose 50% Injectable 25 Gram(s) IV Push once  folic acid 1 milliGRAM(s) Oral daily  glucagon  Injectable 1 milliGRAM(s) IntraMuscular once  heparin  Infusion.  Unit(s)/Hr (11 mL/Hr) IV Continuous <Continuous>  insulin glargine Injectable (LANTUS) 32 Unit(s) SubCutaneous at bedtime  insulin lispro (ADMELOG) corrective regimen sliding scale   SubCutaneous three times a day before meals  insulin lispro (ADMELOG) corrective regimen sliding scale   SubCutaneous <User Schedule>  insulin lispro Injectable (ADMELOG) 16 Unit(s) SubCutaneous before lunch  insulin lispro Injectable (ADMELOG) 15 Unit(s) SubCutaneous before breakfast  insulin lispro Injectable (ADMELOG) 18 Unit(s) SubCutaneous before dinner  methylPREDNISolone 16 milliGRAM(s) Oral daily  mexiletine 200 milliGRAM(s) Oral every 8 hours  multivitamin/minerals 1 Tablet(s) Oral daily  nystatin    Suspension 075781 Unit(s) Swish and Swallow two times a day  pantoprazole    Tablet 40 milliGRAM(s) Oral before breakfast  polyethylene glycol 3350 17 Gram(s) Oral two times a day  senna 2 Tablet(s) Oral at bedtime  sodium chloride 7% Inhalation 4 milliLiter(s) Inhalation every 6 hours  tiotropium 2.5 MICROgram(s) Inhaler 2 Puff(s) Inhalation daily  trimethoprim  160 mG/sulfamethoxazole 800 mG 1 Tablet(s) Oral daily    MEDICATIONS  (PRN):  acetaminophen     Tablet .. 650 milliGRAM(s) Oral every 6 hours PRN Temp greater or equal to 38C (100.4F), Mild Pain (1 - 3)  aluminum hydroxide/magnesium hydroxide/simethicone Suspension 30 milliLiter(s) Oral every 4 hours PRN Dyspepsia  dextrose Oral Gel 15 Gram(s) Oral once PRN Blood Glucose LESS THAN 70 milliGRAM(s)/deciliter  heparin   Injectable 2500 Unit(s) IV Push every 6 hours PRN For aPTT between 40 - 57  heparin   Injectable 5000 Unit(s) IV Push every 6 hours PRN For aPTT less than 40  melatonin 3 milliGRAM(s) Oral at bedtime PRN Insomnia  ondansetron Injectable 4 milliGRAM(s) IV Push every 8 hours PRN Nausea and/or Vomiting      ALLERGIES:  Allergies    tetanus toxoid (Short breath)  cefepime (Anaphylaxis)  penicillin (Anaphylaxis)  Avelox (Short breath; Pruritus)  Dilaudid (Short breath)  codeine (Short breath)  aspirin (Short breath)  iodine (Short breath; Swelling)  Valium (Short breath)  shellfish (Anaphylaxis)    Intolerances        LABS:                        9.0    11.11 )-----------( 265      ( 05 Sep 2023 07:11 )             31.4     09-05    139  |  105  |  30<H>  ----------------------------<  230<H>  4.2   |  23  |  0.83    Ca    9.2      05 Sep 2023 07:11  Phos  3.8     09-05  Mg     2.0     09-05    TPro  5.7<L>  /  Alb  3.6  /  TBili  0.6  /  DBili  x   /  AST  42<H>  /  ALT  20  /  AlkPhos  63  09-05    PT/INR - ( 04 Sep 2023 17:53 )   PT: 11.6 sec;   INR: 1.11 ratio         PTT - ( 05 Sep 2023 07:11 )  PTT:83.4 sec  Urinalysis Basic - ( 05 Sep 2023 07:11 )    Color: x / Appearance: x / SG: x / pH: x  Gluc: 230 mg/dL / Ketone: x  / Bili: x / Urobili: x   Blood: x / Protein: x / Nitrite: x   Leuk Esterase: x / RBC: x / WBC x   Sq Epi: x / Non Sq Epi: x / Bacteria: x      CAPILLARY BLOOD GLUCOSE      POCT Blood Glucose.: 200 mg/dL (05 Sep 2023 08:25)      RADIOLOGY & ADDITIONAL TESTS: Reviewed. CC: Patient is a 72y old  Female who presents with a chief complaint of sob (04 Sep 2023 10:19)      INTERVAL EVENTS: ERIKA    SUBJECTIVE / INTERVAL HPI: Patient seen and examined at bedside. Resting comfortably    ROS: Denies fever, denies nausea, denies vomiting    VITAL SIGNS:  Vital Signs Last 24 Hrs  T(C): 36.9 (04 Sep 2023 06:05), Max: 36.9 (03 Sep 2023 21:18)  T(F): 98.5 (04 Sep 2023 06:05), Max: 98.5 (03 Sep 2023 21:18)  HR: 79 (04 Sep 2023 06:05) (70 - 81)  BP: 105/66 (04 Sep 2023 06:05) (105/66 - 115/62)  BP(mean): --  RR: 18 (04 Sep 2023 06:05) (18 - 18)  SpO2: 98% (04 Sep 2023 06:05) (98% - 100%)    Parameters below as of 04 Sep 2023 06:05  Patient On (Oxygen Delivery Method): room air          09-03-23 @ 07:01  -  09-04-23 @ 07:00  --------------------------------------------------------  IN: 240 mL / OUT: 0 mL / NET: 240 mL        PHYSICAL EXAM:    General: NAD  Eyes: PERRLA, EOMI, Sclera anicteric   Cardiovascular: +S1/S2; Harsh 3/6 systolic murmur appreciated   Respiratory: CTA B/L; no W/R/R  Gastrointestinal: soft, NT/ND, no drainage around site   Neurological: AAOx3; no focal deficits  Psych: Mood: "I'm feeling sleepy" Affect: Congruent and Appropriate     MEDICATIONS:  MEDICATIONS  (STANDING):  albuterol/ipratropium for Nebulization 3 milliLiter(s) Nebulizer every 6 hours  apixaban 5 milliGRAM(s) Oral every 12 hours  atorvastatin 20 milliGRAM(s) Oral at bedtime  buDESOnide    Inhalation Suspension 0.5 milliGRAM(s) Inhalation two times a day  chlorhexidine 4% Liquid 1 Application(s) Topical daily  clotrimazole 1% Cream 1 Application(s) Topical two times a day  dextrose 5%. 1000 milliLiter(s) (100 mL/Hr) IV Continuous <Continuous>  dextrose 5%. 1000 milliLiter(s) (50 mL/Hr) IV Continuous <Continuous>  dextrose 50% Injectable 25 Gram(s) IV Push once  dextrose 50% Injectable 12.5 Gram(s) IV Push once  dextrose 50% Injectable 25 Gram(s) IV Push once  folic acid 1 milliGRAM(s) Oral daily  glucagon  Injectable 1 milliGRAM(s) IntraMuscular once  insulin glargine Injectable (LANTUS) 32 Unit(s) SubCutaneous at bedtime  insulin lispro (ADMELOG) corrective regimen sliding scale   SubCutaneous three times a day before meals  insulin lispro (ADMELOG) corrective regimen sliding scale   SubCutaneous <User Schedule>  insulin lispro Injectable (ADMELOG) 18 Unit(s) SubCutaneous before dinner  insulin lispro Injectable (ADMELOG) 16 Unit(s) SubCutaneous before lunch  insulin lispro Injectable (ADMELOG) 13 Unit(s) SubCutaneous before breakfast  methylPREDNISolone 16 milliGRAM(s) Oral daily  mexiletine 200 milliGRAM(s) Oral every 8 hours  multivitamin/minerals 1 Tablet(s) Oral daily  nystatin    Suspension [id number] Unit(s) Swish and Swallow two times a day  pantoprazole    Tablet 40 milliGRAM(s) Oral before breakfast  polyethylene glycol 3350 17 Gram(s) Oral two times a day  senna 2 Tablet(s) Oral at bedtime  sodium chloride 7% Inhalation 4 milliLiter(s) Inhalation every 6 hours  tiotropium 2.5 MICROgram(s) Inhaler 2 Puff(s) Inhalation daily  trimethoprim  160 mG/sulfamethoxazole 800 mG 1 Tablet(s) Oral daily    MEDICATIONS  (PRN):  acetaminophen     Tablet .. 650 milliGRAM(s) Oral every 6 hours PRN Temp greater or equal to 38C (100.4F), Mild Pain (1 - 3)  aluminum hydroxide/magnesium hydroxide/simethicone Suspension 30 milliLiter(s) Oral every 4 hours PRN Dyspepsia  dextrose Oral Gel 15 Gram(s) Oral once PRN Blood Glucose LESS THAN 70 milliGRAM(s)/deciliter  melatonin 3 milliGRAM(s) Oral at bedtime PRN Insomnia  ondansetron Injectable 4 milliGRAM(s) IV Push every 8 hours PRN Nausea and/or Vomiting      ALLERGIES:  Allergies    tetanus toxoid (Short breath)  cefepime (Anaphylaxis)  penicillin (Anaphylaxis)  Avelox (Short breath; Pruritus)  Dilaudid (Short breath)  codeine (Short breath)  aspirin (Short breath)  iodine (Short breath; Swelling)  Valium (Short breath)  shellfish (Anaphylaxis)    Intolerances        LABS:                        9.8    12.28 )-----------( 246      ( 04 Sep 2023 06:56 )             34.0     09-04    140  |  103  |  25<H>  ----------------------------<  119<H>  4.2   |  22  |  0.96    Ca    9.3      04 Sep 2023 06:55  Phos  4.2     09-04  Mg     2.0     09-04    TPro  5.7<L>  /  Alb  3.7  /  TBili  0.6  /  DBili  x   /  AST  45<H>  /  ALT  21  /  AlkPhos  63  09-04      Urinalysis Basic - ( 04 Sep 2023 06:55 )    Color: x / Appearance: x / SG: x / pH: x  Gluc: 119 mg/dL / Ketone: x  / Bili: x / Urobili: x   Blood: x / Protein: x / Nitrite: x   Leuk Esterase: x / RBC: x / WBC x   Sq Epi: x / Non Sq Epi: x / Bacteria: x      CAPILLARY BLOOD GLUCOSE      POCT Blood Glucose.: 146 mg/dL (04 Sep 2023 08:40)      RADIOLOGY & ADDITIONAL TESTS: Reviewed.

## 2023-09-04 NOTE — PROGRESS NOTE ADULT - PROBLEM SELECTOR PLAN 2
- c/w Eliquis, Mexiletine  - EKG NSR 75, bifascicular block EKG NSR 75, bifascicular block on admission.  - c/w Eliquis, Mexiletine

## 2023-09-04 NOTE — PROGRESS NOTE ADULT - PROBLEM SELECTOR PLAN 3
- Goal less than >200  - Initially had steroid induced hyperglycemia, now on taper   > Endo following and dosing insulin accordingly  > On 25-30 lantus at home, and 7-20 mealtime as well

## 2023-09-04 NOTE — PROGRESS NOTE ADULT - NUTRITIONAL ASSESSMENT
This patient has been assessed with a concern for Malnutrition and has been determined to have a diagnosis/diagnoses of Moderate protein-calorie malnutrition.    This patient is being managed with:   Diet NPO after Midnight-     NPO Start Date: 05-Sep-2023   NPO Start Time: 23:59  Entered: Sep  5 2023  6:10PM    Diet Regular-  Consistent Carbohydrate {Evening Snack} (CSTCHOSN)  Supplement Feeding Modality:  Oral  Glucerna Shake Cans or Servings Per Day:  3       Frequency:  Three Times a day  Entered: Aug 31 2023  1:53PM

## 2023-09-05 LAB
ALBUMIN SERPL ELPH-MCNC: 3.6 G/DL — SIGNIFICANT CHANGE UP (ref 3.3–5)
ALP SERPL-CCNC: 63 U/L — SIGNIFICANT CHANGE UP (ref 40–120)
ALT FLD-CCNC: 20 U/L — SIGNIFICANT CHANGE UP (ref 10–45)
ANION GAP SERPL CALC-SCNC: 11 MMOL/L — SIGNIFICANT CHANGE UP (ref 5–17)
APTT BLD: 51.7 SEC — HIGH (ref 24.5–35.6)
APTT BLD: 60.7 SEC — HIGH (ref 24.5–35.6)
APTT BLD: 83.4 SEC — HIGH (ref 24.5–35.6)
AST SERPL-CCNC: 42 U/L — HIGH (ref 10–40)
BASOPHILS # BLD AUTO: 0 K/UL — SIGNIFICANT CHANGE UP (ref 0–0.2)
BASOPHILS NFR BLD AUTO: 0 % — SIGNIFICANT CHANGE UP (ref 0–2)
BILIRUB SERPL-MCNC: 0.6 MG/DL — SIGNIFICANT CHANGE UP (ref 0.2–1.2)
BUN SERPL-MCNC: 30 MG/DL — HIGH (ref 7–23)
CALCIUM SERPL-MCNC: 9.2 MG/DL — SIGNIFICANT CHANGE UP (ref 8.4–10.5)
CHLORIDE SERPL-SCNC: 105 MMOL/L — SIGNIFICANT CHANGE UP (ref 96–108)
CO2 SERPL-SCNC: 23 MMOL/L — SIGNIFICANT CHANGE UP (ref 22–31)
CREAT SERPL-MCNC: 0.83 MG/DL — SIGNIFICANT CHANGE UP (ref 0.5–1.3)
EGFR: 74 ML/MIN/1.73M2 — SIGNIFICANT CHANGE UP
EOSINOPHIL # BLD AUTO: 0.2 K/UL — SIGNIFICANT CHANGE UP (ref 0–0.5)
EOSINOPHIL NFR BLD AUTO: 1.8 % — SIGNIFICANT CHANGE UP (ref 0–6)
GIANT PLATELETS BLD QL SMEAR: PRESENT — SIGNIFICANT CHANGE UP
GLUCOSE BLDC GLUCOMTR-MCNC: 117 MG/DL — HIGH (ref 70–99)
GLUCOSE BLDC GLUCOMTR-MCNC: 119 MG/DL — HIGH (ref 70–99)
GLUCOSE BLDC GLUCOMTR-MCNC: 200 MG/DL — HIGH (ref 70–99)
GLUCOSE BLDC GLUCOMTR-MCNC: 222 MG/DL — HIGH (ref 70–99)
GLUCOSE BLDC GLUCOMTR-MCNC: 264 MG/DL — HIGH (ref 70–99)
GLUCOSE SERPL-MCNC: 230 MG/DL — HIGH (ref 70–99)
HCT VFR BLD CALC: 31.4 % — LOW (ref 34.5–45)
HCT VFR BLD CALC: 35.9 % — SIGNIFICANT CHANGE UP (ref 34.5–45)
HGB BLD-MCNC: 10.2 G/DL — LOW (ref 11.5–15.5)
HGB BLD-MCNC: 9 G/DL — LOW (ref 11.5–15.5)
LYMPHOCYTES # BLD AUTO: 1.99 K/UL — SIGNIFICANT CHANGE UP (ref 1–3.3)
LYMPHOCYTES # BLD AUTO: 17.9 % — SIGNIFICANT CHANGE UP (ref 13–44)
MAGNESIUM SERPL-MCNC: 2 MG/DL — SIGNIFICANT CHANGE UP (ref 1.6–2.6)
MANUAL SMEAR VERIFICATION: SIGNIFICANT CHANGE UP
MCHC RBC-ENTMCNC: 23.4 PG — LOW (ref 27–34)
MCHC RBC-ENTMCNC: 23.5 PG — LOW (ref 27–34)
MCHC RBC-ENTMCNC: 28.4 GM/DL — LOW (ref 32–36)
MCHC RBC-ENTMCNC: 28.7 GM/DL — LOW (ref 32–36)
MCV RBC AUTO: 81.6 FL — SIGNIFICANT CHANGE UP (ref 80–100)
MCV RBC AUTO: 82.7 FL — SIGNIFICANT CHANGE UP (ref 80–100)
MONOCYTES # BLD AUTO: 0.79 K/UL — SIGNIFICANT CHANGE UP (ref 0–0.9)
MONOCYTES NFR BLD AUTO: 7.1 % — SIGNIFICANT CHANGE UP (ref 2–14)
MYELOCYTES NFR BLD: 0.9 % — HIGH (ref 0–0)
NEUTROPHILS # BLD AUTO: 8.03 K/UL — HIGH (ref 1.8–7.4)
NEUTROPHILS NFR BLD AUTO: 72.3 % — SIGNIFICANT CHANGE UP (ref 43–77)
NRBC # BLD: 3 /100 WBCS — HIGH (ref 0–0)
NRBC # BLD: 4 /100 — HIGH (ref 0–0)
PHOSPHATE SERPL-MCNC: 3.8 MG/DL — SIGNIFICANT CHANGE UP (ref 2.5–4.5)
PLAT MORPH BLD: NORMAL — SIGNIFICANT CHANGE UP
PLATELET # BLD AUTO: 265 K/UL — SIGNIFICANT CHANGE UP (ref 150–400)
PLATELET # BLD AUTO: 292 K/UL — SIGNIFICANT CHANGE UP (ref 150–400)
POTASSIUM SERPL-MCNC: 4.2 MMOL/L — SIGNIFICANT CHANGE UP (ref 3.5–5.3)
POTASSIUM SERPL-SCNC: 4.2 MMOL/L — SIGNIFICANT CHANGE UP (ref 3.5–5.3)
PROT SERPL-MCNC: 5.7 G/DL — LOW (ref 6–8.3)
RBC # BLD: 3.85 M/UL — SIGNIFICANT CHANGE UP (ref 3.8–5.2)
RBC # BLD: 4.34 M/UL — SIGNIFICANT CHANGE UP (ref 3.8–5.2)
RBC # FLD: 36.5 % — HIGH (ref 10.3–14.5)
RBC # FLD: 37.1 % — HIGH (ref 10.3–14.5)
RBC BLD AUTO: SIGNIFICANT CHANGE UP
SODIUM SERPL-SCNC: 139 MMOL/L — SIGNIFICANT CHANGE UP (ref 135–145)
WBC # BLD: 11.11 K/UL — HIGH (ref 3.8–10.5)
WBC # BLD: 14.95 K/UL — HIGH (ref 3.8–10.5)
WBC # FLD AUTO: 11.11 K/UL — HIGH (ref 3.8–10.5)
WBC # FLD AUTO: 14.95 K/UL — HIGH (ref 3.8–10.5)

## 2023-09-05 PROCEDURE — 71045 X-RAY EXAM CHEST 1 VIEW: CPT | Mod: 26

## 2023-09-05 PROCEDURE — 99232 SBSQ HOSP IP/OBS MODERATE 35: CPT | Mod: GC

## 2023-09-05 PROCEDURE — 93010 ELECTROCARDIOGRAM REPORT: CPT

## 2023-09-05 PROCEDURE — 99232 SBSQ HOSP IP/OBS MODERATE 35: CPT

## 2023-09-05 RX ORDER — DIPHENHYDRAMINE HCL 50 MG
50 CAPSULE ORAL ONCE
Refills: 0 | Status: DISCONTINUED | OUTPATIENT
Start: 2023-09-06 | End: 2023-09-06

## 2023-09-05 RX ORDER — INSULIN GLARGINE 100 [IU]/ML
45 INJECTION, SOLUTION SUBCUTANEOUS AT BEDTIME
Refills: 0 | Status: COMPLETED | OUTPATIENT
Start: 2023-09-05 | End: 2023-09-05

## 2023-09-05 RX ORDER — CHLORHEXIDINE GLUCONATE 213 G/1000ML
30 SOLUTION TOPICAL ONCE
Refills: 0 | Status: DISCONTINUED | OUTPATIENT
Start: 2023-09-05 | End: 2023-09-06

## 2023-09-05 RX ORDER — INSULIN LISPRO 100/ML
VIAL (ML) SUBCUTANEOUS
Refills: 0 | Status: DISCONTINUED | OUTPATIENT
Start: 2023-09-05 | End: 2023-09-06

## 2023-09-05 RX ORDER — VANCOMYCIN HCL 1 G
1000 VIAL (EA) INTRAVENOUS ONCE
Refills: 0 | Status: COMPLETED | OUTPATIENT
Start: 2023-09-05 | End: 2023-09-05

## 2023-09-05 RX ORDER — CHLORHEXIDINE GLUCONATE 213 G/1000ML
1 SOLUTION TOPICAL ONCE
Refills: 0 | Status: COMPLETED | OUTPATIENT
Start: 2023-09-05 | End: 2023-09-05

## 2023-09-05 RX ORDER — INSULIN LISPRO 100/ML
VIAL (ML) SUBCUTANEOUS EVERY 6 HOURS
Refills: 0 | Status: DISCONTINUED | OUTPATIENT
Start: 2023-09-05 | End: 2023-09-06

## 2023-09-05 RX ADMIN — CHLORHEXIDINE GLUCONATE 1 APPLICATION(S): 213 SOLUTION TOPICAL at 22:21

## 2023-09-05 RX ADMIN — Medication 3 MILLILITER(S): at 12:41

## 2023-09-05 RX ADMIN — Medication 1 TABLET(S): at 12:40

## 2023-09-05 RX ADMIN — POLYETHYLENE GLYCOL 3350 17 GRAM(S): 17 POWDER, FOR SOLUTION ORAL at 06:52

## 2023-09-05 RX ADMIN — Medication 50 MILLIGRAM(S): at 22:28

## 2023-09-05 RX ADMIN — INSULIN GLARGINE 45 UNIT(S): 100 INJECTION, SOLUTION SUBCUTANEOUS at 22:19

## 2023-09-05 RX ADMIN — Medication 2: at 08:52

## 2023-09-05 RX ADMIN — Medication 0: at 22:21

## 2023-09-05 RX ADMIN — Medication 2: at 01:57

## 2023-09-05 RX ADMIN — Medication 30 MILLILITER(S): at 08:51

## 2023-09-05 RX ADMIN — Medication 30 MILLILITER(S): at 23:26

## 2023-09-05 RX ADMIN — PANTOPRAZOLE SODIUM 40 MILLIGRAM(S): 20 TABLET, DELAYED RELEASE ORAL at 06:52

## 2023-09-05 RX ADMIN — POLYETHYLENE GLYCOL 3350 17 GRAM(S): 17 POWDER, FOR SOLUTION ORAL at 17:33

## 2023-09-05 RX ADMIN — HEPARIN SODIUM 1200 UNIT(S)/HR: 5000 INJECTION INTRAVENOUS; SUBCUTANEOUS at 17:32

## 2023-09-05 RX ADMIN — MEXILETINE HYDROCHLORIDE 200 MILLIGRAM(S): 150 CAPSULE ORAL at 12:46

## 2023-09-05 RX ADMIN — MEXILETINE HYDROCHLORIDE 200 MILLIGRAM(S): 150 CAPSULE ORAL at 22:17

## 2023-09-05 RX ADMIN — Medication 1 TABLET(S): at 12:41

## 2023-09-05 RX ADMIN — HEPARIN SODIUM 2500 UNIT(S): 5000 INJECTION INTRAVENOUS; SUBCUTANEOUS at 01:56

## 2023-09-05 RX ADMIN — Medication 16 MILLIGRAM(S): at 06:53

## 2023-09-05 RX ADMIN — HEPARIN SODIUM 1200 UNIT(S)/HR: 5000 INJECTION INTRAVENOUS; SUBCUTANEOUS at 01:13

## 2023-09-05 RX ADMIN — CHLORHEXIDINE GLUCONATE 1 APPLICATION(S): 213 SOLUTION TOPICAL at 12:45

## 2023-09-05 RX ADMIN — Medication 16 UNIT(S): at 13:11

## 2023-09-05 RX ADMIN — HEPARIN SODIUM 1200 UNIT(S)/HR: 5000 INJECTION INTRAVENOUS; SUBCUTANEOUS at 19:34

## 2023-09-05 RX ADMIN — ATORVASTATIN CALCIUM 20 MILLIGRAM(S): 80 TABLET, FILM COATED ORAL at 22:18

## 2023-09-05 RX ADMIN — MEXILETINE HYDROCHLORIDE 200 MILLIGRAM(S): 150 CAPSULE ORAL at 06:53

## 2023-09-05 RX ADMIN — Medication 4: at 17:55

## 2023-09-05 RX ADMIN — SODIUM CHLORIDE 4 MILLILITER(S): 9 INJECTION INTRAMUSCULAR; INTRAVENOUS; SUBCUTANEOUS at 12:41

## 2023-09-05 RX ADMIN — Medication 18 UNIT(S): at 17:55

## 2023-09-05 RX ADMIN — Medication 15 UNIT(S): at 08:52

## 2023-09-05 RX ADMIN — TIOTROPIUM BROMIDE 2 PUFF(S): 18 CAPSULE ORAL; RESPIRATORY (INHALATION) at 12:41

## 2023-09-05 RX ADMIN — HEPARIN SODIUM 1200 UNIT(S)/HR: 5000 INJECTION INTRAVENOUS; SUBCUTANEOUS at 08:53

## 2023-09-05 RX ADMIN — Medication 500000 UNIT(S): at 06:56

## 2023-09-05 RX ADMIN — Medication 1 MILLIGRAM(S): at 12:41

## 2023-09-05 RX ADMIN — Medication 500000 UNIT(S): at 17:34

## 2023-09-05 RX ADMIN — HEPARIN SODIUM 1200 UNIT(S)/HR: 5000 INJECTION INTRAVENOUS; SUBCUTANEOUS at 07:29

## 2023-09-05 NOTE — PROGRESS NOTE ADULT - SUBJECTIVE AND OBJECTIVE BOX
DIABETES FOLLOW UP NOTE: Saw pt earlier today    Chief Complaint: Endocrine consult requested for management of T2DM    INTERVAL HX: Pt stable, reports she will TAVR surgery again> doesn't know when. Tolerating POs with BG variable between 100s to 300s in the last 24 hours. Pt states that is because she is on high methylprednisolone doses. However, her BGs are erratic, unpredictable and unrelated to timing of steroid dose administrations. Noted BG 300s ac lunch yesterday and 119 today while on same steroid dose. No hypoglycemia.  Per primary team pt going for TAVR tomorrow and will need Prednisone 50mg x 3 again due to iv dye allergy.      Review of Systems:  General: As above  Cardiovascular: No chest pain, palpitations  Respiratory: No SOB, no cough  GI: No nausea, vomiting, abdominal pain  Endocrine: No polyuria, polydipsia or S&Sx of hypoglycemia    Allergies    tetanus toxoid (Short breath)  cefepime (Anaphylaxis)  penicillin (Anaphylaxis)  Avelox (Short breath; Pruritus)  Dilaudid (Short breath)  codeine (Short breath)  aspirin (Short breath)  iodine (Short breath; Swelling)  Valium (Short breath)  shellfish (Anaphylaxis)    Intolerances      MEDICATIONS:  atorvastatin 20 milliGRAM(s) Oral at bedtime  insulin glargine Injectable (LANTUS) 32 Unit(s) SubCutaneous at bedtime  insulin lispro (ADMELOG) corrective regimen sliding scale   SubCutaneous three times a day before meals  insulin lispro (ADMELOG) corrective regimen sliding scale   SubCutaneous <User Schedule>  insulin lispro Injectable (ADMELOG) 18 Unit(s) SubCutaneous before dinner  insulin lispro Injectable (ADMELOG) 16 Unit(s) SubCutaneous before lunch  insulin lispro Injectable (ADMELOG) 15 Unit(s) SubCutaneous before breakfast  methylPREDNISolone 16 milliGRAM(s) Oral daily  trimethoprim  160 mG/sulfamethoxazole 800 mG 1 Tablet(s) Oral daily  vancomycin  IVPB 1000 milliGRAM(s) IV Intermittent once      PHYSICAL EXAM:  VITALS: T(C): 36.7 (09-05-23 @ 11:55)  T(F): 98.1 (09-05-23 @ 11:55), Max: 98.1 (09-05-23 @ 07:00)  HR: 82 (09-05-23 @ 11:55) (76 - 89)  BP: 105/65 (09-05-23 @ 11:55) (105/65 - 117/64)  RR:  (18 - 18)  SpO2:  (98% - 98%)  Wt(kg): --  GENERAL: Female laying in bed in NAD  Abdomen: Soft, nontender, non distended  Extremities: Warm, no edema in all 4 exts  NEURO: Alert and able to answer questions      LABS:  POCT Blood Glucose.: 119 mg/dL (09-05-23 @ 12:55)  POCT Blood Glucose.: 200 mg/dL (09-05-23 @ 08:25)  POCT Blood Glucose.: 264 mg/dL (09-05-23 @ 01:53)  POCT Blood Glucose.: 280 mg/dL (09-04-23 @ 22:31)  POCT Blood Glucose.: 319 mg/dL (09-04-23 @ 12:53)  POCT Blood Glucose.: 146 mg/dL (09-04-23 @ 08:40)  POCT Blood Glucose.: 167 mg/dL (09-04-23 @ 01:31)  POCT Blood Glucose.: 147 mg/dL (09-03-23 @ 21:08)  POCT Blood Glucose.: 172 mg/dL (09-03-23 @ 17:27)  POCT Blood Glucose.: 225 mg/dL (09-03-23 @ 12:45)  POCT Blood Glucose.: 157 mg/dL (09-03-23 @ 08:40)  POCT Blood Glucose.: 245 mg/dL (09-03-23 @ 01:44)  POCT Blood Glucose.: 148 mg/dL (09-02-23 @ 21:21)  POCT Blood Glucose.: 176 mg/dL (09-02-23 @ 17:31)                            9.0    11.11 )-----------( 265      ( 05 Sep 2023 07:11 )             31.4       09-05    139  |  105  |  30<H>  ----------------------------<  230<H>  4.2   |  23  |  0.83    eGFR: 74    Ca    9.2      09-05  Mg     2.0     09-05  Phos  3.8     09-05    TPro  5.7<L>  /  Alb  3.6  /  TBili  0.6  /  DBili  x   /  AST  42<H>  /  ALT  20  /  AlkPhos  63  09-05      A1C with Estimated Average Glucose Result: 9.1 % (06-17-23 @ 10:27)      Estimated Average Glucose: 214 mg/dL (06-17-23 @ 10:27)

## 2023-09-05 NOTE — PROGRESS NOTE ADULT - SUBJECTIVE AND OBJECTIVE BOX
HPI/Interval Hx    Pt. resting in bed, no acute events. offering no complaints.    MEDICATIONS  (STANDING):  albuterol/ipratropium for Nebulization 3 milliLiter(s) Nebulizer every 6 hours  atorvastatin 20 milliGRAM(s) Oral at bedtime  buDESOnide    Inhalation Suspension 0.5 milliGRAM(s) Inhalation two times a day  chlorhexidine 4% Liquid 1 Application(s) Topical daily  clotrimazole 1% Cream 1 Application(s) Topical two times a day  dextrose 5%. 1000 milliLiter(s) (50 mL/Hr) IV Continuous <Continuous>  dextrose 5%. 1000 milliLiter(s) (100 mL/Hr) IV Continuous <Continuous>  dextrose 50% Injectable 12.5 Gram(s) IV Push once  dextrose 50% Injectable 25 Gram(s) IV Push once  dextrose 50% Injectable 25 Gram(s) IV Push once  folic acid 1 milliGRAM(s) Oral daily  glucagon  Injectable 1 milliGRAM(s) IntraMuscular once  heparin  Infusion.  Unit(s)/Hr (11 mL/Hr) IV Continuous <Continuous>  insulin glargine Injectable (LANTUS) 32 Unit(s) SubCutaneous at bedtime  insulin lispro (ADMELOG) corrective regimen sliding scale   SubCutaneous three times a day before meals  insulin lispro (ADMELOG) corrective regimen sliding scale   SubCutaneous <User Schedule>  insulin lispro Injectable (ADMELOG) 15 Unit(s) SubCutaneous before breakfast  insulin lispro Injectable (ADMELOG) 16 Unit(s) SubCutaneous before lunch  insulin lispro Injectable (ADMELOG) 18 Unit(s) SubCutaneous before dinner  methylPREDNISolone 16 milliGRAM(s) Oral daily  mexiletine 200 milliGRAM(s) Oral every 8 hours  multivitamin/minerals 1 Tablet(s) Oral daily  nystatin    Suspension [id number] Unit(s) Swish and Swallow two times a day  pantoprazole    Tablet 40 milliGRAM(s) Oral before breakfast  polyethylene glycol 3350 17 Gram(s) Oral two times a day  senna 2 Tablet(s) Oral at bedtime  sodium chloride 7% Inhalation 4 milliLiter(s) Inhalation every 6 hours  tiotropium 2.5 MICROgram(s) Inhaler 2 Puff(s) Inhalation daily  trimethoprim  160 mG/sulfamethoxazole 800 mG 1 Tablet(s) Oral daily    MEDICATIONS  (PRN):  acetaminophen     Tablet .. 650 milliGRAM(s) Oral every 6 hours PRN Temp greater or equal to 38C (100.4F), Mild Pain (1 - 3)  aluminum hydroxide/magnesium hydroxide/simethicone Suspension 30 milliLiter(s) Oral every 4 hours PRN Dyspepsia  dextrose Oral Gel 15 Gram(s) Oral once PRN Blood Glucose LESS THAN 70 milliGRAM(s)/deciliter  heparin   Injectable 5000 Unit(s) IV Push every 6 hours PRN For aPTT less than 40  heparin   Injectable 2500 Unit(s) IV Push every 6 hours PRN For aPTT between 40 - 57  melatonin 3 milliGRAM(s) Oral at bedtime PRN Insomnia  ondansetron Injectable 4 milliGRAM(s) IV Push every 8 hours PRN Nausea and/or Vomiting      PAST MEDICAL & SURGICAL HISTORY:  Atrial fibrillation  paroxysmal, on eliquis      Diabetes  Type 2      COPD (chronic obstructive pulmonary disease)      Adrenal insufficiency  Medrol daily for over 50 years      Aortic insufficiency  moderate AR on echo 5/3/2018      Pelvic fracture      Asthma      Tracheobronchomalacia  diagnosed 2015, s/p bronchial thermoplasty 2016 (Dr Zapien); recent bronchoscopy 6/5/2018 revealed no evidence of tracheobronchomalacia in trachea or bronchial tubes      Colorectal cancer  4/2018- last treatment , chemo and radiation      Rectal bleeding      Seizure  x 1 1/7/18      DVT (deep venous thrombosis)  15-20 years ago, took coumadin      TIA (transient ischemic attack)  multiple, last 5 years ago - presents as right-sided weakness      History of partial hysterectomy  30 years ago - fibroids      H/O total knee replacement, bilateral  5 years ago      History of sinus surgery  multiple sinus surgeries      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      H/O pelvic surgery  5 years ago - s/p fracture      History of tracheomalacia  2015 - attempted tracheal stenting ([facility])- course complicated by obstruction, respiratory failure, multiple CPR attempts -  stent discontinued; 10/20/2016 Tracheobronchoplasty (Prolene Mesh) performed at [facility] by Dr Zapien      S/P bronchoscopy  6/5/2018 - [place] [facility] (Dr Zapien) no evidence of tracheobronchomalacia in trachea or bronchial tubes      Rectal bleeding  exam under anesthesia ([facility]) 2/2018      Review of Systems  CONSTITUTIONAL: No weakness, fevers or chills, (+) fatigue  EYES/ENT: No visual changes;  No vertigo or throat pain   NECK: No pain or stiffness  RESPIRATORY: No cough, wheezing, hemoptysis; No shortness of breath at rest  CARDIOVASCULAR: No chest pain or palpitations  GASTROINTESTINAL: No abdominal or epigastric pain. No nausea, vomiting, or hematemesis; No diarrhea or constipation. No melena or hematochezia.  GENITOURINARY: No dysuria, frequency or hematuria  NEUROLOGICAL: No numbness or weakness  SKIN: No itching, burning, rashes, or lesions   All other review of systems is negative unless indicated above.    Physical Exam  General: A/ox 3, No acute Distress  Neck: Supple, NO JVD  Cardiac: S1 S2, II/VI RUSB murmur  Pulmonary: Diminished bilaterally, coarse rales L>R  Abdomen: Soft, Non -tender, +BS x 4 quads  Extremities: No Rashes, No edema  Neuro: A/o x 3, No focal deficits    Vital Signs Last 24 Hrs  T(C): 36.7 (05 Sep 2023 11:55), Max: 36.9 (04 Sep 2023 14:40)  T(F): 98.1 (05 Sep 2023 11:55), Max: 98.4 (04 Sep 2023 14:40)  HR: 82 (05 Sep 2023 11:55) (76 - 89)  BP: 105/65 (05 Sep 2023 11:55) (105/65 - 145/72)  BP(mean): --  RR: 18 (05 Sep 2023 11:55) (16 - 18)  SpO2: 98% (05 Sep 2023 11:55) (98% - 98%)    Parameters below as of 05 Sep 2023 11:55  Patient On (Oxygen Delivery Method): room air                            9.0    11.11 )-----------( 265      ( 05 Sep 2023 07:11 )             31.4     09-05    139  |  105  |  30<H>  ----------------------------<  230<H>  4.2   |  23  |  0.83    Ca    9.2      05 Sep 2023 07:11  Phos  3.8     09-05  Mg     2.0     09-05    TPro  5.7<L>  /  Alb  3.6  /  TBili  0.6  /  DBili  x   /  AST  42<H>  /  ALT  20  /  AlkPhos  63  09-05      EKG < from: 12 Lead ECG (08.12.23 @ 05:52) >  Ventricular Rate 76 BPM    Atrial Rate 76 BPM    P-R Interval 188 ms    QRS Duration 126 ms    Q-T Interval 418 ms    QTC Calculation(Bazett) 470 ms    R Axis -78 degrees    T Axis 9 degrees    Diagnosis Line SINUS RHYTHM WITH OCCASIONAL PREMATURE VENTRICULAR COMPLEXES  RIGHT BUNDLE BRANCH BLOCK  LEFT ANTERIOR FASCICULAR BLOCK  *** BIFASCICULAR BLOCK ***  ABNORMAL ECG  WHEN COMPARED WITH ECG OF 24-NOV-2022  Confirmed by MD SILVER JONATHAN ([id number]) on 8/12/2023 10:23:46 AM    < end of copied text >      Other    < from: SAFIA W or WO Ultrasound Enhancing Agent (08.30.23 @ 16:50) >  TRANSESOPHAGEAL ECHOCARDIOGRAM REPORT  ________________________________________________________________________________                                      _______       Pt. Name:       RAYRAY MARTINEZ             Study Date:   8/30/2023          MICHAEL  MRN:            [id number]                   Date of       1950    FINDINGS:     Left Ventricle:  Left ventricular wall thickness is moderately increased. Left ventricular systolic function is normal with an ejection fraction visually estimated at 65 to 70%.     Right Ventricle:  Normal right ventricular systolic function.     Left Atrium:  There is no evidence of left atrial or left atrial appendage thrombus. The left atrial appendage emptying velocity is normal.     Right Atrium:  The right atrium is moderately dilated in size.     Interatrial Septum:  Agitated saline injection was negative for intracardiac shunt.     Aortic Valve:  A bioprosthetic valve replacement present in the aortic position. There is severe prosthetic aortic stenosis. There is no intravalvular regurgitation. Leaflets are poorly visualized. The peak transaortic velocity is 4.23 m/s, peak transaortic gradient is 71.6 mmHg and mean transaortic gradient is 45.0 mmHg with an LVOT/aortic valve VTI ratio of 0.24. The effectivce orifice area is estimated at 0.68 cm² by the continuity equation.     Mitral Valve:  There is normal leaflet mobility of the mitral valve. There is calcification of the mitral valve annulus. There is mild leaflet calcification. Mitral valve leaflets have focal calcification. There is severe mitral regurgitation. 3 D VCA 0.73 sq cm.     Tricuspid Valve:  Structurally normal tricuspid valve with normal leaflet excursion. There is trace tricuspid regurgitation.     Aorta:  There is a type B aorta dissection. S/p repair of a proximal aorta disssection. Unable to image the aortic arch. Chronic dissection of the desceding thoracic aorta.     Pericardium:  No pericardial effusion seen.  ____________________________________________________________________    < end of copied text >  < from: SAFIA W or WO Ultrasound Enhancing Agent (08.30.23 @ 16:50) >  Electronically signed on 8/30/2023 at 6:39:58 PM by Chadwick Bender    < end of copied text >

## 2023-09-05 NOTE — PROGRESS NOTE ADULT - ASSESSMENT
72 F w/h/o uncontrolled T2DM (A1C 9.4%) on basal/bolus insulin PTA. Unknown DM complications. Also COPD, secondary adrenal Insufficiency on chronic steroids, colorectal cancer s/p resection (colostomy bag), Chronic A fib on Eliquis, and tracheomalacia and multiple intubations, type A aortic dissection s/p aortic dissection repair on 9/07/22, sepsis. Pt well known to this provider from prior admissions. Here with SOB> treated for PNA and on Prednisolone  16mg/day for AI.  Also found to have anemia and severe aortic stenosis> hematology and ct surgery following pt.  Endocrine consulted for assistance with uncontrolled DM. Pt Pt going for surgery and will needs  Prednisone 50mg x 3 for procedure. However, no order written yet for NPO tonight or Prednisone doses. Will c/w present insulin doses for now and will write recs in case pt receives Prednisone tonight. BG Goal 100-180mg/dl. Needs to restart MTP 16mg after procedure for AI   Per team pt will receive Prednisone 50mg 13 hours prior to procedure 7 hours prior and 1 hour prior as well. Procedure planed  for 11;30am tomorrow as per primary team.       Will start Freestyle Luis E 3 if pt going home. Needs keshia on phone but pt states she wants daughter to do it.

## 2023-09-05 NOTE — PROGRESS NOTE ADULT - PROBLEM SELECTOR PLAN 4
On chronic steroids at home > medrol 8mg qd from admission in 2022  Per pt she has been sick this year with multiple hospitalizations requiring pulse steroids. Was on Methylprednisolone 28mg PTA.   -Pt on slow titration back to Medrol 8mg qd  Need to re-order MTP 16mg for 9/7> team aware   - Will need stress steroids if acutely ill or for OR. No need for stress steroid dose if pt getting Prednisone 50mg x3 prior to OR tonight> Discussed case with Dr Mares attending endo. If not getting Prednisone 50mg x3 priro to OR please order Hydrocortisone 100mg stress dose  before procedure and then  50mg q6h  for 24 hours after procedure (stress dose)

## 2023-09-05 NOTE — PROGRESS NOTE ADULT - ATTENDING COMMENTS
72F  with history of bronchiectasis on chronic steroids, s/p surgery, allergic rhinitis,  recurrent PNA, PND, tracheomalacia s/p tracheoplasty, ESBL proteus infections (sputum), T2DM, paroxysmal A-fib on Eliquis, colorectal cancer many years ago status post colostomy presenting with shortness of breath for3 days. Prior issues with ESBL/Proteus/yeast in sputum culture and was treated with ertapenem/Benadryl/vancomycin/aztreonam and methylprednisolone. She was discharged home with a midline and completed a course of antibiotics ertapenem (last dose 06/25). She was also recently  treated for MRSA and pseudomonas in sputum and completed Tobra nebs and doxycycline.    Acute bronchiectasis- stable- on steroid taper. 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 SAFIA confirmed the severe AS. Appreciate input by structural cards. Will need replacement likely this week.   On Eliquis for Afib, transitioned to heparin gtt in anticipation of surgery this week. Monitor PTT  T2DM with hyperglycemia- goal BS less than 200- adjust insulin as needed

## 2023-09-05 NOTE — PROGRESS NOTE ADULT - ASSESSMENT
72 female s/p AVR/Bental September 2022 with Dr. Cabrera. Now with prosthetic Aortic Valve Stenosis, Bronchiectasis, and Anemia requiring transfusion

## 2023-09-05 NOTE — PROGRESS NOTE ADULT - SUBJECTIVE AND OBJECTIVE BOX
CC: Patient is a 72y old  Female who presents with a chief complaint of sob (04 Sep 2023 10:19)      INTERVAL EVENTS: ERIKA    SUBJECTIVE / INTERVAL HPI: Patient seen and examined at bedside.     ROS: Denies fever, denies nausea, denies vomiting    VITAL SIGNS:  Vital Signs Last 24 Hrs  T(C): 36.7 (05 Sep 2023 11:55), Max: 36.9 (04 Sep 2023 14:40)  T(F): 98.1 (05 Sep 2023 11:55), Max: 98.4 (04 Sep 2023 14:40)  HR: 82 (05 Sep 2023 11:55) (76 - 89)  BP: 105/65 (05 Sep 2023 11:55) (105/65 - 145/72)  BP(mean): --  RR: 18 (05 Sep 2023 11:55) (16 - 18)  SpO2: 98% (05 Sep 2023 11:55) (98% - 98%)    Parameters below as of 05 Sep 2023 11:55  Patient On (Oxygen Delivery Method): room air            PHYSICAL EXAM:    General: NAD, resting in bed   Eyes: PERRLA, EOMI, Sclera anicteric, arcus senalis    Cardiovascular: +S1/S2; harsh systolic murmur 3/6   Respiratory: CTA B/L; no W/R/R  Gastrointestinal: soft, NT/ND  Extremities: WWP; no edema, clubbing or cyanosis  Neurological: AAOx3; no focal deficits  Psych: Mood: "I'm okay" Affect: Congruent and Appropriate     MEDICATIONS:  MEDICATIONS  (STANDING):  albuterol/ipratropium for Nebulization 3 milliLiter(s) Nebulizer every 6 hours  atorvastatin 20 milliGRAM(s) Oral at bedtime  buDESOnide    Inhalation Suspension 0.5 milliGRAM(s) Inhalation two times a day  chlorhexidine 4% Liquid 1 Application(s) Topical daily  clotrimazole 1% Cream 1 Application(s) Topical two times a day  dextrose 5%. 1000 milliLiter(s) (100 mL/Hr) IV Continuous <Continuous>  dextrose 5%. 1000 milliLiter(s) (50 mL/Hr) IV Continuous <Continuous>  dextrose 50% Injectable 25 Gram(s) IV Push once  dextrose 50% Injectable 12.5 Gram(s) IV Push once  dextrose 50% Injectable 25 Gram(s) IV Push once  folic acid 1 milliGRAM(s) Oral daily  glucagon  Injectable 1 milliGRAM(s) IntraMuscular once  heparin  Infusion.  Unit(s)/Hr (11 mL/Hr) IV Continuous <Continuous>  insulin glargine Injectable (LANTUS) 32 Unit(s) SubCutaneous at bedtime  insulin lispro (ADMELOG) corrective regimen sliding scale   SubCutaneous three times a day before meals  insulin lispro (ADMELOG) corrective regimen sliding scale   SubCutaneous <User Schedule>  insulin lispro Injectable (ADMELOG) 18 Unit(s) SubCutaneous before dinner  insulin lispro Injectable (ADMELOG) 16 Unit(s) SubCutaneous before lunch  insulin lispro Injectable (ADMELOG) 15 Unit(s) SubCutaneous before breakfast  methylPREDNISolone 16 milliGRAM(s) Oral daily  mexiletine 200 milliGRAM(s) Oral every 8 hours  multivitamin/minerals 1 Tablet(s) Oral daily  nystatin    Suspension [id number] Unit(s) Swish and Swallow two times a day  pantoprazole    Tablet 40 milliGRAM(s) Oral before breakfast  polyethylene glycol 3350 17 Gram(s) Oral two times a day  senna 2 Tablet(s) Oral at bedtime  sodium chloride 7% Inhalation 4 milliLiter(s) Inhalation every 6 hours  tiotropium 2.5 MICROgram(s) Inhaler 2 Puff(s) Inhalation daily  trimethoprim  160 mG/sulfamethoxazole 800 mG 1 Tablet(s) Oral daily    MEDICATIONS  (PRN):  acetaminophen     Tablet .. 650 milliGRAM(s) Oral every 6 hours PRN Temp greater or equal to 38C (100.4F), Mild Pain (1 - 3)  aluminum hydroxide/magnesium hydroxide/simethicone Suspension 30 milliLiter(s) Oral every 4 hours PRN Dyspepsia  dextrose Oral Gel 15 Gram(s) Oral once PRN Blood Glucose LESS THAN 70 milliGRAM(s)/deciliter  heparin   Injectable 5000 Unit(s) IV Push every 6 hours PRN For aPTT less than 40  heparin   Injectable 2500 Unit(s) IV Push every 6 hours PRN For aPTT between 40 - 57  melatonin 3 milliGRAM(s) Oral at bedtime PRN Insomnia  ondansetron Injectable 4 milliGRAM(s) IV Push every 8 hours PRN Nausea and/or Vomiting      ALLERGIES:  Allergies    tetanus toxoid (Short breath)  cefepime (Anaphylaxis)  penicillin (Anaphylaxis)  Avelox (Short breath; Pruritus)  Dilaudid (Short breath)  codeine (Short breath)  aspirin (Short breath)  iodine (Short breath; Swelling)  Valium (Short breath)  shellfish (Anaphylaxis)    Intolerances        LABS:                        9.0    11.11 )-----------( 265      ( 05 Sep 2023 07:11 )             31.4     09-05    139  |  105  |  30<H>  ----------------------------<  230<H>  4.2   |  23  |  0.83    Ca    9.2      05 Sep 2023 07:11  Phos  3.8     09-05  Mg     2.0     09-05    TPro  5.7<L>  /  Alb  3.6  /  TBili  0.6  /  DBili  x   /  AST  42<H>  /  ALT  20  /  AlkPhos  63  09-05    PT/INR - ( 04 Sep 2023 17:53 )   PT: 11.6 sec;   INR: 1.11 ratio         PTT - ( 05 Sep 2023 07:11 )  PTT:83.4 sec  Urinalysis Basic - ( 05 Sep 2023 07:11 )    Color: x / Appearance: x / SG: x / pH: x  Gluc: 230 mg/dL / Ketone: x  / Bili: x / Urobili: x   Blood: x / Protein: x / Nitrite: x   Leuk Esterase: x / RBC: x / WBC x   Sq Epi: x / Non Sq Epi: x / Bacteria: x      CAPILLARY BLOOD GLUCOSE      POCT Blood Glucose.: 119 mg/dL (05 Sep 2023 12:55)      RADIOLOGY & ADDITIONAL TESTS: Reviewed.

## 2023-09-05 NOTE — PROGRESS NOTE ADULT - PROBLEM SELECTOR PLAN 1
Structural evaluation complete  Pt. scheduled for Transcatheter aortic valve in valve replacement tomorrow 9/6 with Dr. Leahy and Dr. Collier.   Access site (transfemoral vs transcarotid approach) to be determined.  NPO after midnight  Pt. has a contrast allergy, will need to be premedicated with Prednisone/Benadryl, discussed with Primary Team    LIUSITO Shaw NP  [id number]  Available on TEAMS

## 2023-09-05 NOTE — PROGRESS NOTE ADULT - ASSESSMENT
72F hx bronchiectasis, trachomalacia on chronic steroids, DM, pAfib on Eliquis, colorectal ca yrs ago s/p colostomy, recent admission for parainfluenza PNA on various antibiotics. admitted to medicine for exacerbation of bronchiectasis that has since resolved s/p completed 7 days linezolid and ertapenem. Anemic, likely hemolytic from severe aortic stenosis according to heme. SAFIA completed 8/30. Gated CT per structural heart completed and plans for TAVR/MEL procedure per Structural heart team within the week, likely tmw (9/6)

## 2023-09-05 NOTE — PRE PROCEDURE NOTE - PRE PROCEDURE EVALUATION
Cardiac Surgery Pre-op Note:    CC: Patient is a 72y old  Female who presents with a chief complaint of sob  found  to have severe bioprosthetic valve  stenosis now for TAVR valve in valve in am with Dr Shawn deshpande                                                                                                           Surgeon:    Procedure: (Date) (Procedure)    Allergies    tetanus toxoid (Short breath)  cefepime (Anaphylaxis)  penicillin (Anaphylaxis)  Avelox (Short breath; Pruritus)  Dilaudid (Short breath)  codeine (Short breath)  aspirin (Short breath)  iodine (Short breath; Swelling)  Valium (Short breath)  shellfish (Anaphylaxis)    Intolerances        HPI:  71 yo PMHx  asthma on chronic steroids, bronchiectasis s/p surgery, allergic rhinitis,  recurrent PNA, PND, tracheomalacia s/p tracheoplasty, ESBL proteus infections (sputum),  diabetes, paroxysmal A-fib on Eliquis, colorectal cancer many years ago status post colostomy presenting with concern for shortness of breath. It started 2 and half weeks ago with dysnea on exertion, increased sputum production and running nose. She also endorsed sweatiness, chill, abd pain, denied fever, n/v/c/d, sick contact. she is up to date with vaccines. she has had chronic shortness of breath and cough for years. SHe had a recent hospitalization at an outside hospital  for acute respiratory failure with hypoxia secondary to asthma/COPD exacerbation and bronchopneumonia 6/5/2023 - 6/16/2023), She was found to have ESBL/Proteus/yeast in sputum culture and was treated with ertapenem/Benadryl/vancomycin/aztreonam and methylprednisolone.   She was discharged home with a midline and completed a course of antibiotics ertapenem (last dose 06/25).Dr. Allison also prescribed tobramycin after discharge,  and her metylprednisolone dose was reduced from 32 to 28 mg po daily from last monday to this monday. She states since dose reduction she has had worsened dyspnea. when she called Dr. Allison's office, he urged the patient to come to the ED for further management.     Of note had chronic dyspnea/severe asthma since childhood. She states she had a severe sinus infection requiring surgery at ?10 yo and since then has had difficulties with breathing. She has been on chronic steroids for over 20 years per report.. She uses Breo, Spiriva, albuterol neb and a cough assist device.     In the ED, BP stable 121/65, RR 22, saturating at RA at 100%, 97.5 temp. CBC has 10.56 HGB 10, platelet 227. chemistry normal except glucose 272. procal 0.13, trop 28. Last A1C 9.1 VBG 7.34, pCO2 is 52. CT showed new mucous secretions in the bronchus intermedius.Unchanged infectious/inflammatory small airways disease in the right middle/lower lobes with multifocal small tree-in-bud nodules.Status post ascending aortic and aortic valve replacement, with residual dissection flap better seen on the prior study.Stable indeterminate small left renal nodule and numerous pancreatic cysts. In the ED, she was on Duonebs, LR 500cc, ertapenem adn benadryl was given as well.  (11 Aug 2023 22:09)      PAST MEDICAL & SURGICAL HISTORY:  Atrial fibrillation  paroxysmal, on eliquis      Diabetes  Type 2      COPD (chronic obstructive pulmonary disease)      Adrenal insufficiency  Medrol daily for over 50 years      Aortic insufficiency  moderate AR on echo 5/3/2018      Pelvic fracture      Asthma      Tracheobronchomalacia  diagnosed 2015, s/p bronchial thermoplasty 2016 (Dr Zapien); recent bronchoscopy 6/5/2018 revealed no evidence of tracheobronchomalacia in trachea or bronchial tubes      Colorectal cancer  4/2018- last treatment , chemo and radiation      Rectal bleeding      Seizure  x 1 1/7/18      DVT (deep venous thrombosis)  15-20 years ago, took coumadin      TIA (transient ischemic attack)  multiple, last 5 years ago - presents as right-sided weakness      History of partial hysterectomy  30 years ago - fibroids      H/O total knee replacement, bilateral  5 years ago      History of sinus surgery  multiple sinus surgeries      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      H/O pelvic surgery  5 years ago - s/p fracture      History of tracheomalacia  2015 - attempted tracheal stenting ([facility])- course complicated by obstruction, respiratory failure, multiple CPR attempts -  stent discontinued; 10/20/2016 Tracheobronchoplasty (Prolene Mesh) performed at [facility] by Dr Zapien      S/P bronchoscopy  6/5/2018 - [place] (Dr Zapien) no evidence of tracheobronchomalacia in trachea or bronchial tubes      Rectal bleeding  exam under anesthesia ([facility]) 2/2018          MEDICATIONS  (STANDING):  albuterol/ipratropium for Nebulization 3 milliLiter(s) Nebulizer every 6 hours  atorvastatin 20 milliGRAM(s) Oral at bedtime  buDESOnide    Inhalation Suspension 0.5 milliGRAM(s) Inhalation two times a day  chlorhexidine 0.12% Liquid 30 milliLiter(s) Swish and Spit once  chlorhexidine 4% Liquid 1 Application(s) Topical once  chlorhexidine 4% Liquid 1 Application(s) Topical daily  clotrimazole 1% Cream 1 Application(s) Topical two times a day  dextrose 5%. 1000 milliLiter(s) (100 mL/Hr) IV Continuous <Continuous>  dextrose 5%. 1000 milliLiter(s) (50 mL/Hr) IV Continuous <Continuous>  dextrose 50% Injectable 25 Gram(s) IV Push once  dextrose 50% Injectable 12.5 Gram(s) IV Push once  dextrose 50% Injectable 25 Gram(s) IV Push once  folic acid 1 milliGRAM(s) Oral daily  glucagon  Injectable 1 milliGRAM(s) IntraMuscular once  heparin  Infusion.  Unit(s)/Hr (11 mL/Hr) IV Continuous <Continuous>  insulin glargine Injectable (LANTUS) 32 Unit(s) SubCutaneous at bedtime  insulin lispro (ADMELOG) corrective regimen sliding scale   SubCutaneous <User Schedule>  insulin lispro (ADMELOG) corrective regimen sliding scale   SubCutaneous three times a day before meals  insulin lispro Injectable (ADMELOG) 16 Unit(s) SubCutaneous before lunch  insulin lispro Injectable (ADMELOG) 15 Unit(s) SubCutaneous before breakfast  insulin lispro Injectable (ADMELOG) 18 Unit(s) SubCutaneous before dinner  methylPREDNISolone 16 milliGRAM(s) Oral daily  mexiletine 200 milliGRAM(s) Oral every 8 hours  multivitamin/minerals 1 Tablet(s) Oral daily  nystatin    Suspension [id number] Unit(s) Swish and Swallow two times a day  pantoprazole    Tablet 40 milliGRAM(s) Oral before breakfast  polyethylene glycol 3350 17 Gram(s) Oral two times a day  senna 2 Tablet(s) Oral at bedtime  sodium chloride 7% Inhalation 4 milliLiter(s) Inhalation every 6 hours  tiotropium 2.5 MICROgram(s) Inhaler 2 Puff(s) Inhalation daily  trimethoprim  160 mG/sulfamethoxazole 800 mG 1 Tablet(s) Oral daily  vancomycin  IVPB 1000 milliGRAM(s) IV Intermittent once    MEDICATIONS  (PRN):  acetaminophen     Tablet .. 650 milliGRAM(s) Oral every 6 hours PRN Temp greater or equal to 38C (100.4F), Mild Pain (1 - 3)  aluminum hydroxide/magnesium hydroxide/simethicone Suspension 30 milliLiter(s) Oral every 4 hours PRN Dyspepsia  dextrose Oral Gel 15 Gram(s) Oral once PRN Blood Glucose LESS THAN 70 milliGRAM(s)/deciliter  heparin   Injectable 5000 Unit(s) IV Push every 6 hours PRN For aPTT less than 40  heparin   Injectable 2500 Unit(s) IV Push every 6 hours PRN For aPTT between 40 - 57  melatonin 3 milliGRAM(s) Oral at bedtime PRN Insomnia  ondansetron Injectable 4 milliGRAM(s) IV Push every 8 hours PRN Nausea and/or Vomiting        Labs:                        9.0    11.11 )-----------( 265      ( 05 Sep 2023 07:11 )             31.4     09-05    139  |  105  |  30<H>  ----------------------------<  230<H>  4.2   |  23  |  0.83    Ca    9.2      05 Sep 2023 07:11  Phos  3.8     09-05  Mg     2.0     09-05    TPro  5.7<L>  /  Alb  3.6  /  TBili  0.6  /  DBili  x   /  AST  42<H>  /  ALT  20  /  AlkPhos  63  09-05    PT/INR - ( 04 Sep 2023 17:53 )   PT: 11.6 sec;   INR: 1.11 ratio         PTT - ( 05 Sep 2023 07:11 )  PTT:83.4 sec    Blood Type:   HGB A1C:   Prealbumin:   Pro-BNP:   Thyroid Panel:   MRSA:  / MSSA:   Urinalysis Basic - ( 05 Sep 2023 07:11 )    Color: x / Appearance: x / SG: x / pH: x  Gluc: 230 mg/dL / Ketone: x  / Bili: x / Urobili: x   Blood: x / Protein: x / Nitrite: x   Leuk Esterase: x / RBC: x / WBC x   Sq Epi: x / Non Sq Epi: x / Bacteria: x        CXR:     EKG:    Carotid Duplex:      PFT's:    Echocardiogram:    Cardiac catheterization:    Vein Mapping:    Gen: WN/WD NAD  Neuro: AAOx3, nonfocal  Pulm: CTA B/L  CV: RRR, S1S2  Abd: Soft, NT, ND +BS  Ext: No edema, + peripheral pulses      Pt has AICD/PPM [ ] Yes  [ ] No             Brand Name:  Pre-op Beta Blocker ordered within 24 hrs of surgery (CABG ONLY)?  [ ] Yes  [ ] No  If not, Why?  Type & Cross  [ ] Yes  [ ] No  NPO after Midnight [ ] Yes  [ ] No  Pre-op ABX ordered, to be taped on chart:  [ ] Yes  [ ] No     Hibiclens/Peridex ordered [ ] Yes  [ ] No  Intraop on Hold: PRBCs, CXR, SAFIA [ ]   Consent obtained  [ ] Yes  [ ] No   Cardiac Surgery Pre-op Note:    CC: Patient is a 72y old  Female who presents with a chief complaint of sob  found  to have severe bioprosthetic valve  stenosis now for TAVR valve in valve in am with Dr Leahy and Dr Savage.                                                                                                           Surgeon: Dr Savage    Procedure: 9/6 TAVR    Allergies    tetanus toxoid (Short breath)  cefepime (Anaphylaxis)  penicillin (Anaphylaxis)  Avelox (Short breath; Pruritus)  Dilaudid (Short breath)  codeine (Short breath)  aspirin (Short breath)  iodine (Short breath; Swelling)  Valium (Short breath)  shellfish (Anaphylaxis)    Intolerances        HPI:  73 yo PMHx  asthma on chronic steroids, bronchiectasis s/p surgery, allergic rhinitis,  recurrent PNA, PND, tracheomalacia s/p tracheoplasty, ESBL proteus infections (sputum),  diabetes, paroxysmal A-fib on Eliquis, colorectal cancer many years ago status post colostomy presenting with concern for shortness of breath. It started 2 and half weeks ago with dysnea on exertion, increased sputum production and running nose. She also endorsed sweatiness, chill, abd pain, denied fever, n/v/c/d, sick contact. she is up to date with vaccines. she has had chronic shortness of breath and cough for years. SHe had a recent hospitalization at an outside hospital  for acute respiratory failure with hypoxia secondary to asthma/COPD exacerbation and bronchopneumonia 6/5/2023 - 6/16/2023), She was found to have ESBL/Proteus/yeast in sputum culture and was treated with ertapenem/Benadryl/vancomycin/aztreonam and methylprednisolone.   She was discharged home with a midline and completed a course of antibiotics ertapenem (last dose 06/25).Dr. Allison also prescribed tobramycin after discharge,  and her metylprednisolone dose was reduced from 32 to 28 mg po daily from last monday to this monday. She states since dose reduction she has had worsened dyspnea. when she called Dr. Allison's office, he urged the patient to come to the ED for further management.     Of note had chronic dyspnea/severe asthma since childhood. She states she had a severe sinus infection requiring surgery at ?12 yo and since then has had difficulties with breathing. She has been on chronic steroids for over 20 years per report.. She uses Breo, Spiriva, albuterol neb and a cough assist device.     In the ED, BP stable 121/65, RR 22, saturating at RA at 100%, 97.5 temp. CBC has 10.56 HGB 10, platelet 227. chemistry normal except glucose 272. procal 0.13, trop 28. Last A1C 9.1 VBG 7.34, pCO2 is 52. CT showed new mucous secretions in the bronchus intermedius.Unchanged infectious/inflammatory small airways disease in the right middle/lower lobes with multifocal small tree-in-bud nodules.Status post ascending aortic and aortic valve replacement, with residual dissection flap better seen on the prior study.Stable indeterminate small left renal nodule and numerous pancreatic cysts. In the ED, she was on Duonebs, LR 500cc, ertapenem adn benadryl was given as well.  (11 Aug 2023 22:09)      PAST MEDICAL & SURGICAL HISTORY:  Atrial fibrillation  paroxysmal, on eliquis      Diabetes  Type 2      COPD (chronic obstructive pulmonary disease)      Adrenal insufficiency  Medrol daily for over 50 years      Aortic insufficiency  moderate AR on echo 5/3/2018      Pelvic fracture      Asthma      Tracheobronchomalacia  diagnosed 2015, s/p bronchial thermoplasty 2016 (Dr Zapien); recent bronchoscopy 6/5/2018 revealed no evidence of tracheobronchomalacia in trachea or bronchial tubes      Colorectal cancer  4/2018- last treatment , chemo and radiation      Rectal bleeding      Seizure  x 1 1/7/18      DVT (deep venous thrombosis)  15-20 years ago, took coumadin      TIA (transient ischemic attack)  multiple, last 5 years ago - presents as right-sided weakness      History of partial hysterectomy  30 years ago - fibroids      H/O total knee replacement, bilateral  5 years ago      History of sinus surgery  multiple sinus surgeries      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      H/O pelvic surgery  5 years ago - s/p fracture      History of tracheomalacia  2015 - attempted tracheal stenting ([facility])- course complicated by obstruction, respiratory failure, multiple CPR attempts -  stent discontinued; 10/20/2016 Tracheobronchoplasty (Prolene Mesh) performed at [facility] by Dr Zapien      S/P bronchoscopy  6/5/2018 - [place] (Dr Zapien) no evidence of tracheobronchomalacia in trachea or bronchial tubes      Rectal bleeding  exam under anesthesia ([facility]) 2/2018          MEDICATIONS  (STANDING):  albuterol/ipratropium for Nebulization 3 milliLiter(s) Nebulizer every 6 hours  atorvastatin 20 milliGRAM(s) Oral at bedtime  buDESOnide    Inhalation Suspension 0.5 milliGRAM(s) Inhalation two times a day  chlorhexidine 0.12% Liquid 30 milliLiter(s) Swish and Spit once  chlorhexidine 4% Liquid 1 Application(s) Topical once  chlorhexidine 4% Liquid 1 Application(s) Topical daily  clotrimazole 1% Cream 1 Application(s) Topical two times a day  dextrose 5%. 1000 milliLiter(s) (100 mL/Hr) IV Continuous <Continuous>  dextrose 5%. 1000 milliLiter(s) (50 mL/Hr) IV Continuous <Continuous>  dextrose 50% Injectable 25 Gram(s) IV Push once  dextrose 50% Injectable 12.5 Gram(s) IV Push once  dextrose 50% Injectable 25 Gram(s) IV Push once  folic acid 1 milliGRAM(s) Oral daily  glucagon  Injectable 1 milliGRAM(s) IntraMuscular once  heparin  Infusion.  Unit(s)/Hr (11 mL/Hr) IV Continuous <Continuous>  insulin glargine Injectable (LANTUS) 32 Unit(s) SubCutaneous at bedtime  insulin lispro (ADMELOG) corrective regimen sliding scale   SubCutaneous <User Schedule>  insulin lispro (ADMELOG) corrective regimen sliding scale   SubCutaneous three times a day before meals  insulin lispro Injectable (ADMELOG) 16 Unit(s) SubCutaneous before lunch  insulin lispro Injectable (ADMELOG) 15 Unit(s) SubCutaneous before breakfast  insulin lispro Injectable (ADMELOG) 18 Unit(s) SubCutaneous before dinner  methylPREDNISolone 16 milliGRAM(s) Oral daily  mexiletine 200 milliGRAM(s) Oral every 8 hours  multivitamin/minerals 1 Tablet(s) Oral daily  nystatin    Suspension [id number] Unit(s) Swish and Swallow two times a day  pantoprazole    Tablet 40 milliGRAM(s) Oral before breakfast  polyethylene glycol 3350 17 Gram(s) Oral two times a day  senna 2 Tablet(s) Oral at bedtime  sodium chloride 7% Inhalation 4 milliLiter(s) Inhalation every 6 hours  tiotropium 2.5 MICROgram(s) Inhaler 2 Puff(s) Inhalation daily  trimethoprim  160 mG/sulfamethoxazole 800 mG 1 Tablet(s) Oral daily  vancomycin  IVPB 1000 milliGRAM(s) IV Intermittent once    MEDICATIONS  (PRN):  acetaminophen     Tablet .. 650 milliGRAM(s) Oral every 6 hours PRN Temp greater or equal to 38C (100.4F), Mild Pain (1 - 3)  aluminum hydroxide/magnesium hydroxide/simethicone Suspension 30 milliLiter(s) Oral every 4 hours PRN Dyspepsia  dextrose Oral Gel 15 Gram(s) Oral once PRN Blood Glucose LESS THAN 70 milliGRAM(s)/deciliter  heparin   Injectable 5000 Unit(s) IV Push every 6 hours PRN For aPTT less than 40  heparin   Injectable 2500 Unit(s) IV Push every 6 hours PRN For aPTT between 40 - 57  melatonin 3 milliGRAM(s) Oral at bedtime PRN Insomnia  ondansetron Injectable 4 milliGRAM(s) IV Push every 8 hours PRN Nausea and/or Vomiting    T(C): 36.7 (09-05-23 @ 11:55), Max: 36.7 (09-04-23 @ 22:22)  T(F): 98.1 (09-05-23 @ 11:55), Max: 98.1 (09-05-23 @ 07:00)  HR: 82 (09-05-23 @ 11:55) (76 - 89)  BP: 105/65 (09-05-23 @ 11:55) (105/65 - 117/64)  RR: 18 (09-05-23 @ 11:55) (18 - 18)  SpO2: 98% (09-05-23 @ 11:55) (98% - 98%)    Daily  170 cm height  Daily 65 KG weight        Labs:                        9.0    11.11 )-----------( 265      ( 05 Sep 2023 07:11 )             31.4     09-05    139  |  105  |  30<H>  ----------------------------<  230<H>  4.2   |  23  |  0.83    Ca    9.2      05 Sep 2023 07:11  Phos  3.8     09-05  Mg     2.0     09-05    TPro  5.7<L>  /  Alb  3.6  /  TBili  0.6  /  DBili  x   /  AST  42<H>  /  ALT  20  /  AlkPhos  63  09-05    PT/INR - ( 04 Sep 2023 17:53 )   PT: 11.6 sec;   INR: 1.11 ratio         PTT - ( 05 Sep 2023 07:11 )  PTT:83.4 sec    Blood Type: Type + Screen (08.29.23 @ 08:52)   ABO Interpretation: B  Rh Interpretation: Positive  Antibody Screen: Negative  A1c 9.1  MRSA:  / MSSA:   Urinalysis Basic - ( 05 Sep 2023 07:11 )    Color: x / Appearance: x / SG: x / pH: x  Gluc: 230 mg/dL / Ketone: x  / Bili: x / Urobili: x   Blood: x / Protein: x / Nitrite: x   Leuk Esterase: x / RBC: x / WBC x   Sq Epi: x / Non Sq Epi: x / Bacteria: x            EKG:< from: 12 Lead ECG (08.12.23 @ 05:52) >    Diagnosis Line SINUS RHYTHM WITH OCCASIONAL PREMATURE VENTRICULAR COMPLEXES  RIGHT BUNDLE BRANCH BLOCK  LEFT ANTERIOR FASCICULAR BLOCK  *** BIFASCICULAR BLOCK ***  ABNORMAL ECG  WHEN COMPARED WITH ECG OF 24-NOV-2022    < from: VA Duplex Carotid, Bilat (08.29.23 @ 11:33) >  There is dilatation and tortuosity to the right and the left carotid   arteries in the neck.    No elevated velocities or abnormal waveforms are encountered.    Peak systolic velocities are as follows:    RIGHT:  PROX CCA = 74  DIST CCA = 56  PROX ICA = 54  DIST ICA = 60  ECA = 84    LEFT:  PROX CCA = 110  DIST CCA = 89  PROX ICA = 131  DIST ICA = 68  ECA = 25    Antegrade flow is noted within both vertebral arteries.    IMPRESSION: No significant hemodynamic stenosis of either carotid artery.    Measurement of carotid stenosis is based on velocity parameters that   correlate the residual internal carotid diameter with that of the more   distal vessel in accordance with a method such as the [place] American   Symptomatic Carotid Endarterectomy Trial (NASCET).    < end of copied text >  < from: CT Angio Chest TAVR MERLIN w/wo IV Cont (09.01.23 @ 10:35) >    IMPRESSION:  Bioprosthetic aortic valve is thickened low-attenuation leaflets.    Aortic measurements as reported above.    Residual aortic dissection extending from the proximal aortic arch to the   distal abdominal aorta as described above. No significant change in the   aortic dissection compared to computed tomography of April 5, 2023.    < end of copied text >  < from: Xray Chest 1 View AP/PA (08.15.23 @ 10:40) >  IMPRESSION:  No evidence for acute cardiopulmonary pathology.      < end of copied text >  < end of copied text >          Gen: WN/WD NAD  Neuro: AAOx3, nonfocal  Pulm: CTA B/L remote healed  tracheostomy   CV: RRR, S1S2   Abd:  colostomy + Effluent stoma pink Soft, NT, ND   Ext: No edema, + peripheral pulses      Pt has AICD/PPM [ ] Yes  [ xo             Brand Name:  Pre-op Beta Blocker ordered within 24 hrs of surgery (CABG ONLY)? TAVR  Type & Cross  [x Yes  [ ] No  NPO after Midnight [x ] Yes  [ ] No  Pre-op ABX ordered, to be taped on chart:  x[ ] Yes  [ ] No     Hibiclens/Peridex ordered [x ] Yes  [ ] No  Intraop on Hold: PRBCs, CXR, SAFIA [x ]   Consent obtained  [ x] Yes  [ ] No    PLAN-  NPO at midnight  Type and screen   Vancomycin on call to OR Taped  to chart   Hibiclens wash tonight and am  TAVR  Valve in valve in am

## 2023-09-05 NOTE — PROGRESS NOTE ADULT - PROBLEM SELECTOR PLAN 1
- Anemia workup c/f hemolysis, heme and CTS consulted       - Heme: hemolytic 2/2 AV stenosis from TAVR or natural valve based on smears and darby neg.       - CTS: not a candidate for open surgical repair  - SAFIA 8/30 per structural cardiology -> Valve severely stenotic   - Gated cardiac CT to assess anatomy for TAV in JENNIFER procedure  - 2u pRBC total over course of admission   > following cards rec. per Dr. Leahy, scheduled for TAVR/MEL tomorrow   > Switched to Heparin in anticipation of procedure tmw

## 2023-09-06 ENCOUNTER — APPOINTMENT (OUTPATIENT)
Dept: CARDIOTHORACIC SURGERY | Facility: HOSPITAL | Age: 73
End: 2023-09-06

## 2023-09-06 LAB
ALBUMIN SERPL ELPH-MCNC: 3.6 G/DL — SIGNIFICANT CHANGE UP (ref 3.3–5)
ALBUMIN SERPL ELPH-MCNC: 3.6 G/DL — SIGNIFICANT CHANGE UP (ref 3.3–5)
ALP SERPL-CCNC: 62 U/L — SIGNIFICANT CHANGE UP (ref 40–120)
ALP SERPL-CCNC: 69 U/L — SIGNIFICANT CHANGE UP (ref 40–120)
ALT FLD-CCNC: 22 U/L — SIGNIFICANT CHANGE UP (ref 10–45)
ALT FLD-CCNC: 24 U/L — SIGNIFICANT CHANGE UP (ref 10–45)
ANION GAP SERPL CALC-SCNC: 12 MMOL/L — SIGNIFICANT CHANGE UP (ref 5–17)
ANION GAP SERPL CALC-SCNC: 13 MMOL/L — SIGNIFICANT CHANGE UP (ref 5–17)
APTT BLD: 27.6 SEC — SIGNIFICANT CHANGE UP (ref 24.5–35.6)
APTT BLD: 55.7 SEC — HIGH (ref 24.5–35.6)
AST SERPL-CCNC: 59 U/L — HIGH (ref 10–40)
AST SERPL-CCNC: 59 U/L — HIGH (ref 10–40)
BASOPHILS # BLD AUTO: 0.01 K/UL — SIGNIFICANT CHANGE UP (ref 0–0.2)
BASOPHILS NFR BLD AUTO: 0.1 % — SIGNIFICANT CHANGE UP (ref 0–2)
BILIRUB SERPL-MCNC: 0.6 MG/DL — SIGNIFICANT CHANGE UP (ref 0.2–1.2)
BILIRUB SERPL-MCNC: 0.8 MG/DL — SIGNIFICANT CHANGE UP (ref 0.2–1.2)
BLD GP AB SCN SERPL QL: NEGATIVE — SIGNIFICANT CHANGE UP
BUN SERPL-MCNC: 22 MG/DL — SIGNIFICANT CHANGE UP (ref 7–23)
BUN SERPL-MCNC: 25 MG/DL — HIGH (ref 7–23)
CALCIUM SERPL-MCNC: 8.3 MG/DL — LOW (ref 8.4–10.5)
CALCIUM SERPL-MCNC: 8.8 MG/DL — SIGNIFICANT CHANGE UP (ref 8.4–10.5)
CHLORIDE SERPL-SCNC: 102 MMOL/L — SIGNIFICANT CHANGE UP (ref 96–108)
CHLORIDE SERPL-SCNC: 107 MMOL/L — SIGNIFICANT CHANGE UP (ref 96–108)
CHROM ANALY INTERPHASE BLD FISH-IMP: SIGNIFICANT CHANGE UP
CO2 SERPL-SCNC: 21 MMOL/L — LOW (ref 22–31)
CO2 SERPL-SCNC: 23 MMOL/L — SIGNIFICANT CHANGE UP (ref 22–31)
CREAT SERPL-MCNC: 0.67 MG/DL — SIGNIFICANT CHANGE UP (ref 0.5–1.3)
CREAT SERPL-MCNC: 0.7 MG/DL — SIGNIFICANT CHANGE UP (ref 0.5–1.3)
EGFR: 91 ML/MIN/1.73M2 — SIGNIFICANT CHANGE UP
EGFR: 92 ML/MIN/1.73M2 — SIGNIFICANT CHANGE UP
EOSINOPHIL # BLD AUTO: 0 K/UL — SIGNIFICANT CHANGE UP (ref 0–0.5)
EOSINOPHIL NFR BLD AUTO: 0 % — SIGNIFICANT CHANGE UP (ref 0–6)
GAS PNL BLDA: SIGNIFICANT CHANGE UP
GAS PNL BLDA: SIGNIFICANT CHANGE UP
GLUCOSE BLDC GLUCOMTR-MCNC: 250 MG/DL — HIGH (ref 70–99)
GLUCOSE BLDC GLUCOMTR-MCNC: 281 MG/DL — HIGH (ref 70–99)
GLUCOSE BLDC GLUCOMTR-MCNC: 338 MG/DL — HIGH (ref 70–99)
GLUCOSE SERPL-MCNC: 378 MG/DL — HIGH (ref 70–99)
GLUCOSE SERPL-MCNC: 72 MG/DL — SIGNIFICANT CHANGE UP (ref 70–99)
HCT VFR BLD CALC: 29.5 % — LOW (ref 34.5–45)
HCT VFR BLD CALC: 31.5 % — LOW (ref 34.5–45)
HGB BLD-MCNC: 8.4 G/DL — LOW (ref 11.5–15.5)
HGB BLD-MCNC: 9 G/DL — LOW (ref 11.5–15.5)
IMM GRANULOCYTES NFR BLD AUTO: 2 % — HIGH (ref 0–0.9)
INR BLD: 0.98 RATIO — SIGNIFICANT CHANGE UP (ref 0.85–1.18)
INR BLD: 1.06 RATIO — SIGNIFICANT CHANGE UP (ref 0.85–1.18)
LYMPHOCYTES # BLD AUTO: 0.45 K/UL — LOW (ref 1–3.3)
LYMPHOCYTES # BLD AUTO: 4.4 % — LOW (ref 13–44)
MAGNESIUM SERPL-MCNC: 2.2 MG/DL — SIGNIFICANT CHANGE UP (ref 1.6–2.6)
MCHC RBC-ENTMCNC: 23.1 PG — LOW (ref 27–34)
MCHC RBC-ENTMCNC: 23.3 PG — LOW (ref 27–34)
MCHC RBC-ENTMCNC: 28.5 GM/DL — LOW (ref 32–36)
MCHC RBC-ENTMCNC: 28.6 GM/DL — LOW (ref 32–36)
MCV RBC AUTO: 81.3 FL — SIGNIFICANT CHANGE UP (ref 80–100)
MCV RBC AUTO: 81.6 FL — SIGNIFICANT CHANGE UP (ref 80–100)
MONOCYTES # BLD AUTO: 0.35 K/UL — SIGNIFICANT CHANGE UP (ref 0–0.9)
MONOCYTES NFR BLD AUTO: 3.4 % — SIGNIFICANT CHANGE UP (ref 2–14)
NEUTROPHILS # BLD AUTO: 9.28 K/UL — HIGH (ref 1.8–7.4)
NEUTROPHILS NFR BLD AUTO: 90.1 % — HIGH (ref 43–77)
NRBC # BLD: 0 /100 WBCS — SIGNIFICANT CHANGE UP (ref 0–0)
NRBC # BLD: 1 /100 WBCS — HIGH (ref 0–0)
PHOSPHATE SERPL-MCNC: 2.7 MG/DL — SIGNIFICANT CHANGE UP (ref 2.5–4.5)
PLATELET # BLD AUTO: 240 K/UL — SIGNIFICANT CHANGE UP (ref 150–400)
PLATELET # BLD AUTO: 286 K/UL — SIGNIFICANT CHANGE UP (ref 150–400)
POTASSIUM SERPL-MCNC: 4.8 MMOL/L — SIGNIFICANT CHANGE UP (ref 3.5–5.3)
POTASSIUM SERPL-MCNC: 5.3 MMOL/L — SIGNIFICANT CHANGE UP (ref 3.5–5.3)
POTASSIUM SERPL-SCNC: 4.8 MMOL/L — SIGNIFICANT CHANGE UP (ref 3.5–5.3)
POTASSIUM SERPL-SCNC: 5.3 MMOL/L — SIGNIFICANT CHANGE UP (ref 3.5–5.3)
PROT SERPL-MCNC: 5.5 G/DL — LOW (ref 6–8.3)
PROT SERPL-MCNC: 5.8 G/DL — LOW (ref 6–8.3)
PROTHROM AB SERPL-ACNC: 10.3 SEC — SIGNIFICANT CHANGE UP (ref 9.5–13)
PROTHROM AB SERPL-ACNC: 11.1 SEC — SIGNIFICANT CHANGE UP (ref 9.5–13)
RBC # BLD: 3.63 M/UL — LOW (ref 3.8–5.2)
RBC # BLD: 3.86 M/UL — SIGNIFICANT CHANGE UP (ref 3.8–5.2)
RBC # FLD: 36.3 % — HIGH (ref 10.3–14.5)
RBC # FLD: 36.6 % — HIGH (ref 10.3–14.5)
RH IG SCN BLD-IMP: POSITIVE — SIGNIFICANT CHANGE UP
SODIUM SERPL-SCNC: 136 MMOL/L — SIGNIFICANT CHANGE UP (ref 135–145)
SODIUM SERPL-SCNC: 142 MMOL/L — SIGNIFICANT CHANGE UP (ref 135–145)
T3 SERPL-MCNC: 82 NG/DL — SIGNIFICANT CHANGE UP (ref 80–200)
T4 AB SER-ACNC: 6 UG/DL — SIGNIFICANT CHANGE UP (ref 4.6–12)
T4 FREE SERPL-MCNC: 1.2 NG/DL — SIGNIFICANT CHANGE UP (ref 0.9–1.8)
TSH SERPL-MCNC: 0.49 UIU/ML — SIGNIFICANT CHANGE UP (ref 0.27–4.2)
WBC # BLD: 10.3 K/UL — SIGNIFICANT CHANGE UP (ref 3.8–10.5)
WBC # BLD: 13.05 K/UL — HIGH (ref 3.8–10.5)
WBC # FLD AUTO: 10.3 K/UL — SIGNIFICANT CHANGE UP (ref 3.8–10.5)
WBC # FLD AUTO: 13.05 K/UL — HIGH (ref 3.8–10.5)

## 2023-09-06 PROCEDURE — 33361 REPLACE AORTIC VALVE PERQ: CPT | Mod: 62,Q0

## 2023-09-06 PROCEDURE — 99232 SBSQ HOSP IP/OBS MODERATE 35: CPT | Mod: GC

## 2023-09-06 PROCEDURE — 99291 CRITICAL CARE FIRST HOUR: CPT

## 2023-09-06 PROCEDURE — 94010 BREATHING CAPACITY TEST: CPT | Mod: 26

## 2023-09-06 PROCEDURE — 99232 SBSQ HOSP IP/OBS MODERATE 35: CPT

## 2023-09-06 PROCEDURE — 93306 TTE W/DOPPLER COMPLETE: CPT | Mod: 26

## 2023-09-06 DEVICE — CATH VASCULAR EXPO FEMORAL LEFT CURVE (FL4) 0.045" X 5FR X 100CM: Type: IMPLANTABLE DEVICE | Status: FUNCTIONAL

## 2023-09-06 DEVICE — CATH TAVR EVOLUT FX 14FR 23-29MM: Type: IMPLANTABLE DEVICE | Status: FUNCTIONAL

## 2023-09-06 DEVICE — GUIDEWIRE LUNDERQUIST EXTRA-STIFF EXTENDED CURVE 0.035" X 260CM: Type: IMPLANTABLE DEVICE | Status: FUNCTIONAL

## 2023-09-06 DEVICE — SHEATH INTRODUCER TERUMO PINNACLE CORONARY 8FR X 10CM X 0.038" MINI WIRE: Type: IMPLANTABLE DEVICE | Status: FUNCTIONAL

## 2023-09-06 DEVICE — SHEATH INTRODUCER TERUMO PINNACLE CORONARY 6FR X 25CM: Type: IMPLANTABLE DEVICE | Status: FUNCTIONAL

## 2023-09-06 DEVICE — GWIRE STR .038X180 STIFF LONG TAPR: Type: IMPLANTABLE DEVICE | Status: FUNCTIONAL

## 2023-09-06 DEVICE — CATH VASCULAR EXPO VENTRICULAR PIGTAIL CURVE (PIG) 0.045" X 5FR X 100CM: Type: IMPLANTABLE DEVICE | Status: FUNCTIONAL

## 2023-09-06 DEVICE — GUIDEWIRE LUNDERQUIST EXTRA-STIFF CURVED 0.035" X 260CM: Type: IMPLANTABLE DEVICE | Status: FUNCTIONAL

## 2023-09-06 DEVICE — WIRE GUIDE EXCHANGE J TIP 3MM X 260CM: Type: IMPLANTABLE DEVICE | Status: FUNCTIONAL

## 2023-09-06 DEVICE — CATH IVUS T/A PU .035CM: Type: IMPLANTABLE DEVICE | Status: FUNCTIONAL

## 2023-09-06 DEVICE — VALVE TAVR EVOLUT FX 26MM: Type: IMPLANTABLE DEVICE | Status: FUNCTIONAL

## 2023-09-06 DEVICE — IMPLANTABLE DEVICE: Type: IMPLANTABLE DEVICE | Status: FUNCTIONAL

## 2023-09-06 DEVICE — SUT PERCLOSE PROGLIDE 6FR: Type: IMPLANTABLE DEVICE | Status: FUNCTIONAL

## 2023-09-06 DEVICE — SHEATH INTRODUCER TERUMO PINNACLE CORONARY 6FR X 10CM X 0.038" MINI WIRE: Type: IMPLANTABLE DEVICE | Status: FUNCTIONAL

## 2023-09-06 DEVICE — LOADING SYSTEM EVOLUT FX 23-29MM: Type: IMPLANTABLE DEVICE | Status: FUNCTIONAL

## 2023-09-06 DEVICE — WIRE GD SAFARI2 275CM XSML CRV: Type: IMPLANTABLE DEVICE | Status: FUNCTIONAL

## 2023-09-06 DEVICE — GLDWIRE ADVANTAGE .035X260 CM: Type: IMPLANTABLE DEVICE | Status: FUNCTIONAL

## 2023-09-06 DEVICE — BLLN TRUE DIALATION 22MMX4.5CM: Type: IMPLANTABLE DEVICE | Status: FUNCTIONAL

## 2023-09-06 DEVICE — INTRO CHECK FLO SHEATH 18FRX35X55CM: Type: IMPLANTABLE DEVICE | Status: FUNCTIONAL

## 2023-09-06 DEVICE — GUIDEWIRE AMPLATZ EXTRA-STIFF CURVED .035" X 260CM: Type: IMPLANTABLE DEVICE | Status: FUNCTIONAL

## 2023-09-06 DEVICE — SHEATH INTRODUCER TERUMO PINNACLE PERIPHERAL 9FR X 10CM: Type: IMPLANTABLE DEVICE | Status: FUNCTIONAL

## 2023-09-06 DEVICE — CATH VASCULAR EXPO EXPO AMPLATZ LEFT CURVE (AL1) 0.045" X 5FR X 100CM: Type: IMPLANTABLE DEVICE | Status: FUNCTIONAL

## 2023-09-06 DEVICE — GWIRE GUID  0.035INX150CM: Type: IMPLANTABLE DEVICE | Status: FUNCTIONAL

## 2023-09-06 DEVICE — PACING CATH PACEL RIGHT HEART CURVE 5FR: Type: IMPLANTABLE DEVICE | Status: FUNCTIONAL

## 2023-09-06 DEVICE — CATH VASCULAR EXPO VENTRICULAR PIGTAIL CURVE (PIG 145) 0.045" X 5FR X 10CM: Type: IMPLANTABLE DEVICE | Status: FUNCTIONAL

## 2023-09-06 RX ORDER — ATORVASTATIN CALCIUM 80 MG/1
20 TABLET, FILM COATED ORAL AT BEDTIME
Refills: 0 | Status: DISCONTINUED | OUTPATIENT
Start: 2023-09-06 | End: 2023-09-10

## 2023-09-06 RX ORDER — INSULIN LISPRO 100/ML
VIAL (ML) SUBCUTANEOUS
Refills: 0 | Status: DISCONTINUED | OUTPATIENT
Start: 2023-09-06 | End: 2023-09-08

## 2023-09-06 RX ORDER — CLOPIDOGREL BISULFATE 75 MG/1
75 TABLET, FILM COATED ORAL DAILY
Refills: 0 | Status: DISCONTINUED | OUTPATIENT
Start: 2023-09-07 | End: 2023-09-10

## 2023-09-06 RX ORDER — CHLORHEXIDINE GLUCONATE 213 G/1000ML
5 SOLUTION TOPICAL
Refills: 0 | Status: DISCONTINUED | OUTPATIENT
Start: 2023-09-06 | End: 2023-09-09

## 2023-09-06 RX ORDER — SODIUM CHLORIDE 9 MG/ML
1000 INJECTION INTRAMUSCULAR; INTRAVENOUS; SUBCUTANEOUS
Refills: 0 | Status: DISCONTINUED | OUTPATIENT
Start: 2023-09-06 | End: 2023-09-07

## 2023-09-06 RX ORDER — HEPARIN SODIUM 5000 [USP'U]/ML
5000 INJECTION INTRAVENOUS; SUBCUTANEOUS EVERY 8 HOURS
Refills: 0 | Status: DISCONTINUED | OUTPATIENT
Start: 2023-09-06 | End: 2023-09-10

## 2023-09-06 RX ORDER — IPRATROPIUM/ALBUTEROL SULFATE 18-103MCG
3 AEROSOL WITH ADAPTER (GRAM) INHALATION EVERY 6 HOURS
Refills: 0 | Status: DISCONTINUED | OUTPATIENT
Start: 2023-09-06 | End: 2023-09-10

## 2023-09-06 RX ORDER — DIPHENHYDRAMINE HCL 50 MG
50 CAPSULE ORAL EVERY 6 HOURS
Refills: 0 | Status: DISCONTINUED | OUTPATIENT
Start: 2023-09-06 | End: 2023-09-08

## 2023-09-06 RX ORDER — BUDESONIDE AND FORMOTEROL FUMARATE DIHYDRATE 160; 4.5 UG/1; UG/1
2 AEROSOL RESPIRATORY (INHALATION)
Refills: 0 | Status: DISCONTINUED | OUTPATIENT
Start: 2023-09-06 | End: 2023-09-10

## 2023-09-06 RX ORDER — ONDANSETRON 8 MG/1
4 TABLET, FILM COATED ORAL EVERY 8 HOURS
Refills: 0 | Status: DISCONTINUED | OUTPATIENT
Start: 2023-09-06 | End: 2023-09-10

## 2023-09-06 RX ORDER — VANCOMYCIN HCL 1 G
1000 VIAL (EA) INTRAVENOUS EVERY 12 HOURS
Refills: 0 | Status: COMPLETED | OUTPATIENT
Start: 2023-09-07 | End: 2023-09-07

## 2023-09-06 RX ORDER — BUDESONIDE, MICRONIZED 100 %
0.5 POWDER (GRAM) MISCELLANEOUS EVERY 12 HOURS
Refills: 0 | Status: DISCONTINUED | OUTPATIENT
Start: 2023-09-06 | End: 2023-09-07

## 2023-09-06 RX ORDER — TIOTROPIUM BROMIDE 18 UG/1
2 CAPSULE ORAL; RESPIRATORY (INHALATION) DAILY
Refills: 0 | Status: DISCONTINUED | OUTPATIENT
Start: 2023-09-06 | End: 2023-09-10

## 2023-09-06 RX ORDER — WARFARIN SODIUM 2.5 MG/1
5 TABLET ORAL ONCE
Refills: 0 | Status: COMPLETED | OUTPATIENT
Start: 2023-09-07 | End: 2023-09-07

## 2023-09-06 RX ORDER — MEXILETINE HYDROCHLORIDE 150 MG/1
200 CAPSULE ORAL EVERY 8 HOURS
Refills: 0 | Status: DISCONTINUED | OUTPATIENT
Start: 2023-09-06 | End: 2023-09-10

## 2023-09-06 RX ADMIN — Medication 10: at 09:00

## 2023-09-06 RX ADMIN — ONDANSETRON 4 MILLIGRAM(S): 8 TABLET, FILM COATED ORAL at 13:56

## 2023-09-06 RX ADMIN — Medication 3 MILLILITER(S): at 00:19

## 2023-09-06 RX ADMIN — HEPARIN SODIUM 1200 UNIT(S)/HR: 5000 INJECTION INTRAVENOUS; SUBCUTANEOUS at 07:19

## 2023-09-06 RX ADMIN — SODIUM CHLORIDE 10 MILLILITER(S): 9 INJECTION INTRAMUSCULAR; INTRAVENOUS; SUBCUTANEOUS at 19:00

## 2023-09-06 RX ADMIN — SODIUM CHLORIDE 4 MILLILITER(S): 9 INJECTION INTRAMUSCULAR; INTRAVENOUS; SUBCUTANEOUS at 12:11

## 2023-09-06 RX ADMIN — SODIUM CHLORIDE 4 MILLILITER(S): 9 INJECTION INTRAMUSCULAR; INTRAVENOUS; SUBCUTANEOUS at 00:18

## 2023-09-06 RX ADMIN — Medication 3 MILLILITER(S): at 23:32

## 2023-09-06 RX ADMIN — MEXILETINE HYDROCHLORIDE 200 MILLIGRAM(S): 150 CAPSULE ORAL at 04:16

## 2023-09-06 RX ADMIN — Medication 50 MILLIGRAM(S): at 04:16

## 2023-09-06 RX ADMIN — Medication 500000 UNIT(S): at 04:17

## 2023-09-06 RX ADMIN — HEPARIN SODIUM 1300 UNIT(S)/HR: 5000 INJECTION INTRAVENOUS; SUBCUTANEOUS at 10:58

## 2023-09-06 RX ADMIN — HEPARIN SODIUM 2500 UNIT(S): 5000 INJECTION INTRAVENOUS; SUBCUTANEOUS at 11:05

## 2023-09-06 RX ADMIN — Medication 0: at 02:17

## 2023-09-06 RX ADMIN — CHLORHEXIDINE GLUCONATE 1 APPLICATION(S): 213 SOLUTION TOPICAL at 12:12

## 2023-09-06 RX ADMIN — Medication 8: at 13:01

## 2023-09-06 RX ADMIN — TIOTROPIUM BROMIDE 2 PUFF(S): 18 CAPSULE ORAL; RESPIRATORY (INHALATION) at 12:11

## 2023-09-06 RX ADMIN — PANTOPRAZOLE SODIUM 40 MILLIGRAM(S): 20 TABLET, DELAYED RELEASE ORAL at 04:16

## 2023-09-06 RX ADMIN — Medication 3 MILLILITER(S): at 12:11

## 2023-09-06 NOTE — PROGRESS NOTE ADULT - PROBLEM SELECTOR PLAN 4
Hospital Bundle    Fluids: PO  Electrolytes: Replete K > 4, Mg > 2, Phos > 3  Nutrition: Diet dash consistent carb  PPX  ---VTE: on Eliquis  ---GI: on PPI, bowel reg  ---Resp: n/a  Access: PIV  Code Status: FULL  Dispo: Home w/ Home PT pending further workup. Hospital Bundle    Fluids: PO  Electrolytes: Replete K > 4, Mg > 2, Phos > 3  Nutrition: Diet dash consistent carb  PPX  ---VTE: on heparin until procedure, eliquis held  ---GI: on PPI, bowel reg  ---Resp: n/a  Access: PIV  Code Status: FULL  Dispo: Home w/ Home PT pending further workup.

## 2023-09-06 NOTE — PROGRESS NOTE ADULT - PROBLEM SELECTOR PLAN 1
Test BG ac and hs and 2am if eating. Q6H while NPO  After surgery pt might benefit from insulin drip until steroid hyperglycemic effect wears off.  Once pt is stable tolerating POs please restart Lantus 32 units qhs   Once tolerating POs restart Admelog 15-16-18 units qac   Hold if NPO or eating less than 50% of meals.  Once on SQ insulin reorder moderate correction scale ac meals and moderate correction hs and 2am.    Please contact endo team with any changes on steroid doses!!   Discharge:  Will be determined according to BG/insulin needs/PO intake and steroid therapy at time of discharge.  Plan to d/c on basal bolus. Doses TBD  Outpt. endo follow-up. Dr Saavedra  Outpt. optho, podiatry, micro/cr  Make sure pt has Rx for all DM supplies and insulin/ DM meds. Consider [employer] [facility] E 3 if going home

## 2023-09-06 NOTE — PROGRESS NOTE ADULT - PROBLEM SELECTOR PLAN 1
- Anemia workup c/f hemolysis, heme and CTS consulted       - Heme: hemolytic 2/2 AV stenosis from TAVR or natural valve based on smears and darby neg.       - CTS: not a candidate for open surgical repair  - SAFIA 8/30 per structural cardiology -> Valve severely stenotic   - Gated cardiac CT to assess anatomy for TAV in JENNIFER procedure  - 2u pRBC total over course of admission   > following cards rec. per Dr. Leahy, scheduled for TAVR/MEL today  > Switched to Heparin in anticipation of procedure today - Anemia workup c/f hemolysis, heme and CTS consulted       - Heme: hemolytic 2/2 AV stenosis from TAVR or natural valve based on smears and darby neg.       - CTS: not a candidate for open surgical repair  - SAFIA 8/30 per structural cardiology -> Valve severely stenotic   - Gated cardiac CT to assess anatomy for TAV in JENNIFER procedure  - 2u pRBC total over course of admission   > TAVR Procedure today, premedicated  > Family contacted and agreeable  > Structural Cards onboard with different device and transfemoral route

## 2023-09-06 NOTE — PROGRESS NOTE ADULT - SUBJECTIVE AND OBJECTIVE BOX
DIABETES FOLLOW UP NOTE: Saw pt earlier today    Chief Complaint: Endocrine consult requested for management of T2DM    INTERVAL HX: Saw pt prior to OR today. Pt stable, NPO with BG 200s to 300s even though pt received higher insulin doses in the last 12 hours while on Prednisone in preparation for procedure today.  No hypoglycemia.      Review of Systems:  General: As above  Cardiovascular: No chest pain, palpitations  Respiratory: No SOB, no cough  GI: No nausea, vomiting, abdominal pain  Endocrine: No polyuria, polydipsia or S&Sx of hypoglycemia    Allergies    tetanus toxoid (Short breath)  cefepime (Anaphylaxis)  penicillin (Anaphylaxis)  Avelox (Short breath; Pruritus)  Dilaudid (Short breath)  codeine (Short breath)  aspirin (Short breath)  iodine (Short breath; Swelling)  Valium (Short breath)  shellfish (Anaphylaxis)    Intolerances      MEDICATIONS  (STANDING):  diphenhydrAMINE 50 milliGRAM(s) Oral once  predniSONE   Tablet 50 milliGRAM(s) Oral once  vancomycin  IVPB 1000 milliGRAM(s) IV Intermittent once      PHYSICAL EXAM:  VITALS: T(C): 36.4 (09-06-23 @ 15:49)  T(F): 97.5 (09-06-23 @ 14:32), Max: 98.3 (09-05-23 @ 21:44)  HR: 84 (09-06-23 @ 15:49) (78 - 84)  BP: 133/79 (09-06-23 @ 15:49) (112/74 - 133/79)  RR:  (18 - 18)  SpO2:  (97% - 99%)  Wt(kg): --  GENERAL: Female laying in bed in NAD  Abdomen: Soft, nontender, non distended  Extremities: Warm, no edema in all 4 exts  NEURO: Alert and able to answer questions    LABS:  POCT Blood Glucose.: 281 mg/dL (09-06-23 @ 12:34)  POCT Blood Glucose.: 338 mg/dL (09-06-23 @ 08:55)  POCT Blood Glucose.: 250 mg/dL (09-06-23 @ 02:14)  POCT Blood Glucose.: 117 mg/dL (09-05-23 @ 22:02)  POCT Blood Glucose.: 222 mg/dL (09-05-23 @ 17:41)  POCT Blood Glucose.: 119 mg/dL (09-05-23 @ 12:55)  POCT Blood Glucose.: 200 mg/dL (09-05-23 @ 08:25)  POCT Blood Glucose.: 264 mg/dL (09-05-23 @ 01:53)  POCT Blood Glucose.: 280 mg/dL (09-04-23 @ 22:31)  POCT Blood Glucose.: 319 mg/dL (09-04-23 @ 12:53)  POCT Blood Glucose.: 146 mg/dL (09-04-23 @ 08:40)  POCT Blood Glucose.: 167 mg/dL (09-04-23 @ 01:31)  POCT Blood Glucose.: 147 mg/dL (09-03-23 @ 21:08)  POCT Blood Glucose.: 172 mg/dL (09-03-23 @ 17:27)                            9.0    10.30 )-----------( 286      ( 06 Sep 2023 09:32 )             31.5       09-06    136  |  102  |  25<H>  ----------------------------<  378<H>  5.3   |  21<L>  |  0.67    eGFR: 92    Ca    8.8      09-06  Mg     2.2     09-06  Phos  2.7     09-06    TPro  5.8<L>  /  Alb  3.6  /  TBili  0.8  /  DBili  x   /  AST  59<H>  /  ALT  24  /  AlkPhos  69  09-06      Thyroid Function Tests:  09-06 @ 09:32 TSH 0.49 FreeT4 1.2 T3 82 Anti TPO -- Anti Thyroglobulin Ab -- TSI --      A1C with Estimated Average Glucose Result: 9.1 % (06-17-23 @ 10:27)      Estimated Average Glucose: 214 mg/dL (06-17-23 @ 10:27)

## 2023-09-06 NOTE — PROGRESS NOTE ADULT - SUBJECTIVE AND OBJECTIVE BOX
Patient seen and examined at the bedside.    Remained critically ill on continuous ICU monitoring.    OBJECTIVE:  Vital Signs Last 24 Hrs  T(C): 35.2 (06 Sep 2023 18:20), Max: 36.8 (05 Sep 2023 21:44)  T(F): 95.4 (06 Sep 2023 18:20), Max: 98.3 (05 Sep 2023 21:44)  HR: 66 (06 Sep 2023 19:00) (63 - 84)  BP: 113/58 (06 Sep 2023 18:20) (112/74 - 133/79)  BP(mean): 83 (06 Sep 2023 18:20) (83 - 83)  RR: 27 (06 Sep 2023 19:00) (16 - 27)  SpO2: 100% (06 Sep 2023 19:00) (97% - 100%)    Parameters below as of 06 Sep 2023 18:20  Patient On (Oxygen Delivery Method): room air    Physical Exam:   General: NAD   Neurology: nonfocal   Eyes: bilateral pupils equal and reactive   ENT/Neck: Neck supple, trachea midline, No JVD   Respiratory: Clear bilaterally   CV: S1S2, no murmurs        [x] Sinus rhythm [x] Temporary pacing - DDD 40  Abdominal: Soft, NT, ND +BS   Extremities: 1-2+ pedal edema noted, + peripheral pulses   ============================I/O===========================   I&O's Detail    05 Sep 2023 07:01  -  06 Sep 2023 07:00  --------------------------------------------------------  IN:    Heparin Infusion: 132 mL    Oral Fluid: 620 mL  Total IN: 752 mL    OUT:  Total OUT: 0 mL    Total NET: 752 mL    06 Sep 2023 07:01  -  06 Sep 2023 20:54  --------------------------------------------------------  IN:    Sodium Chloride 0.9% Bolus: 20 mL  Total IN: 20 mL    OUT:    Voided (mL): 200 mL  Total OUT: 200 mL    Total NET: -180 mL  ============================ LABS =========================             8.4    13.05 )-----------( 240      ( 06 Sep 2023 18:40 )             29.5     09-06    142  |  107  |  22  ----------------------------<  72  4.8   |  23  |  0.70    Ca    8.3<L>      06 Sep 2023 18:40  Phos  2.7     09-06  Mg     2.2     09-06    TPro  5.5<L>  /  Alb  3.6  /  TBili  0.6  /  DBili  x   /  AST  59<H>  /  ALT  22  /  AlkPhos  62  09-06    LIVER FUNCTIONS - ( 06 Sep 2023 18:40 )  Alb: 3.6 g/dL / Pro: 5.5 g/dL / ALK PHOS: 62 U/L / ALT: 22 U/L / AST: 59 U/L / GGT: x           PT/INR - ( 06 Sep 2023 18:40 )   PT: 11.1 sec;   INR: 1.06 ratio       PTT - ( 06 Sep 2023 18:40 )  PTT:27.6 sec  ABG - ( 06 Sep 2023 16:52 )  pH, Arterial: 7.33  pH, Blood: x     /  pCO2: 49    /  pO2: 121   / HCO3: 26    / Base Excess: -0.3  /  SaO2: 98.8      Urinalysis Basic - ( 06 Sep 2023 18:40 )    Color: x / Appearance: x / SG: x / pH: x  Gluc: 72 mg/dL / Ketone: x  / Bili: x / Urobili: x   Blood: x / Protein: x / Nitrite: x   Leuk Esterase: x / RBC: x / WBC x   Sq Epi: x / Non Sq Epi: x / Bacteria: x  ======================Micro/Rad/Cardio=================  Culture: Reviewed   CXR: Reviewed  Echo: Reviewed  ======================================================  PAST MEDICAL & SURGICAL HISTORY:  Atrial fibrillation  paroxysmal, on eliquis    Diabetes  Type 2    COPD (chronic obstructive pulmonary disease)    Adrenal insufficiency  Medrol daily for over 50 years    Aortic insufficiency  moderate AR on echo 5/3/2018    Pelvic fracture    Asthma    Tracheobronchomalacia  diagnosed 2015, s/p bronchial thermoplasty 2016 (Dr Zapien); recent bronchoscopy 6/5/2018 revealed no evidence of tracheobronchomalacia in trachea or bronchial tubes    Colorectal cancer  4/2018- last treatment , chemo and radiation    Rectal bleeding    Seizure  x 1 1/7/18    DVT (deep venous thrombosis)  15-20 years ago, took coumadin    TIA (transient ischemic attack)  multiple, last 5 years ago - presents as right-sided weakness    History of partial hysterectomy  30 years ago - fibroids    H/O total knee replacement, bilateral  5 years ago    History of sinus surgery  multiple sinus surgeries    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    H/O pelvic surgery  5 years ago - s/p fracture    History of tracheomalacia  2015 - attempted tracheal stenting ([facility])- course complicated by obstruction, respiratory failure, multiple CPR attempts -  stent discontinued; 10/20/2016 Tracheobronchoplasty (Prolene Mesh) performed at [facility] by Dr Zapien    S/P bronchoscopy  6/5/2018 - [place] [facility] (Dr Zapien) no evidence of tracheobronchomalacia in trachea or bronchial tubes    Rectal bleeding  exam under anesthesia ([facility]) 2/2018  ======================================================  Assessment:  73 yo PMHx  asthma on chronic steroids, bronchiectasis s/p surgery, allergic rhinitis,  recurrent PNA, PND, tracheomalacia s/p tracheoplasty, ESBL proteus infections (sputum),  diabetes, paroxysmal A-fib on Eliquis, colorectal cancer many years ago status post colostomy.    Op day - Valve in valve TAVR   Hypovolemia  Post op respiratory insufficiency  Acute blood loss anemia  ======================================================  Plan:   ***Neuro***  [x] Nonfocal    ***Cardiovascular***  Invasive hemodynamic monitoring, assess perfusion indices   SR / Hct 29.5%  / Lactate 1.4  Continuous reassessment of hemodynamics   Continue on Mexiletine  [x] VTE ppx with SQ Heparin  [x] Statin     ***Pulmonary***  [x] NC - 5L  Encourage incentive spirometry, continue pulse ox monitoring, follow ABGs             ***GI***  [x] NPO  Zofran for nausea    ***Renal***  Continue to monitor I/Os, BUN/Creatinine.   Replete lytes PRN    ***ID***  Vancomycin for preoperative prophylaxis    ***Endocrine***  [x] DM : f/u HbA1c                - [x] ISS             - Need tight glycemic control to prevent wound infection.    Patient requires continuous monitoring with bedside rhythm monitoring, arterial line, pulse oximetry, ventilator monitoring; intermittent blood gas analysis. Care plan discussed with ICU care team. Patient remains critical; required more than usual ICU care.    patient remain critical; required more than usual post op care; I have spent 50 minutes providing non routine post op care, revaluated multiple times during the day .     Care Plan discussed with the ICU care team. Patient remained critical, at risk for life threatening decompensation.     By signing my name below, I, Joni De La Cruz, attest that this documentation has been prepared under the direction and in the presence of Jethro Nur MD.  Electronically signed: Georgi Lee, 09-06-23 @ 20:54    I, Liudmila Morelos, personally performed the services described in this documentation. all medical record entries made by the marcyibronaldo were at my direction and in my presence. I have reviewed the chart and agree that the record reflects my personal performance and is accurate and complete  Electronically signed: Jethro Nur MD.

## 2023-09-06 NOTE — PROGRESS NOTE ADULT - PROBLEM SELECTOR PLAN 3
- Goal less than >200  - Initially had steroid induced hyperglycemia, now on taper   > Endo following and dosing insulin accordingly  > On 25-30 lantus at home, and 7-20 mealtime as well - Goal less than >200  - Initially had steroid induced hyperglycemia, taper held due to premedication, will be restarted on 9/7.   > Endo following and dosing insulin accordingly  > On 25-30 lantus at home, and 7-20 mealtime as well

## 2023-09-06 NOTE — PROGRESS NOTE ADULT - ATTENDING COMMENTS
72F  with history of bronchiectasis on chronic steroids, s/p surgery, allergic rhinitis,  recurrent PNA, PND, tracheomalacia s/p tracheoplasty, ESBL proteus infections (sputum), T2DM, paroxysmal A-fib on Eliquis, colorectal cancer many years ago status post colostomy presenting with shortness of breath for3 days. Prior issues with ESBL/Proteus/yeast in sputum culture and was treated with ertapenem/Benadryl/vancomycin/aztreonam and methylprednisolone. She was discharged home with a midline and completed a course of antibiotics ertapenem (last dose 06/25). She was also recently  treated for MRSA and pseudomonas in sputum and completed Tobra nebs and doxycycline.    Acute bronchiectasis- stable- on steroid taper. 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 SAFIA confirmed the severe AS. Appreciate input by structural cards. Getting replacement today  On Eliquis for Afib, transitioned to heparin gtt in anticipation of surgery -monitor PTT  T2DM with hyperglycemia- goal BS less than 200- adjust insulin as needed 72F  with history of bronchiectasis on chronic steroids, s/p surgery, allergic rhinitis,  recurrent PNA, PND, tracheomalacia s/p tracheoplasty, ESBL proteus infections (sputum), T2DM, paroxysmal A-fib on Eliquis, colorectal cancer many years ago status post colostomy presenting with shortness of breath for3 days. Prior issues with ESBL/Proteus/yeast in sputum culture and was treated with ertapenem/Benadryl/vancomycin/aztreonam and methylprednisolone. She was discharged home with a midline and completed a course of antibiotics ertapenem (last dose 06/25). She was also recently  treated for MRSA and pseudomonas in sputum and completed Tobra nebs and doxycycline.    Acute bronchiectasis- stable- on steroid taper. 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 SAFIA confirmed the severe AS. Appreciate input by structural cards. Getting replacement today  On Eliquis for Afib, transitioned to heparin gtt in anticipation of surgery -monitor PTT  T2DM with hyperglycemia- goal BS less than 200- adjust insulin as needed    Case d/w resident

## 2023-09-06 NOTE — PRE-ANESTHESIA EVALUATION ADULT - NSANTHPMHFT_GEN_ALL_CORE
72F PMH COPD/bronchiectasis/emphysema (not on home O2, on steroids), IDT2DM, a. fib, colon CA s/p colectomy, recent PNA admission, Melina (9/2022) now with prosthetic valve AS, normal LVSF.  Residual type B dissection present.  Self reports contrast allergy.
TIA: various, 2019 most recent, no residual weakness  DVT: 15 years ago  Asthma: ER visits since 2022, intubations for asthma-unsure of when  Seizure: patient denies  COPD: no home O2

## 2023-09-06 NOTE — PROVIDER CONTACT NOTE (OTHER) - ASSESSMENT
refused further attempts for time being
Patient AOx4, no acute distress and not symptomatic.  Patient says she feels "fine" and doesn't feel lightheaded or dizzy. Patient states that she would like to eat.
Pt fingerstick is 99; pt ate partial dinner and is drinking glucerna

## 2023-09-06 NOTE — PROGRESS NOTE ADULT - PROBLEM SELECTOR PLAN 4
On chronic steroids at home > medrol 8mg qd from admission in 2022  Per pt she has been sick this year with multiple hospitalizations requiring pulse steroids. Was on Methylprednisolone 28mg PTA.   -Pt on slow titration back to Medrol 8mg qd  -Re-order MTP 16mg for 9/7> team aware   - Will need stress steroids if acutely ill or for OR. No need for stress steroid dose at this time since pt received Prednisone 50mg x3 prior to OR

## 2023-09-06 NOTE — PROGRESS NOTE ADULT - ASSESSMENT
72F hx bronchiectasis, trachomalacia on chronic steroids, DM, pAfib on Eliquis, colorectal ca yrs ago s/p colostomy, recent admission for parainfluenza PNA on various antibiotics. admitted to medicine for exacerbation of bronchiectasis that has since resolved s/p completed 7 days linezolid and ertapenem. Anemic, likely hemolytic from severe aortic stenosis according to heme. SAFIA completed 8/30. Gated CT per structural heart completed and plans for TAVR/MEL procedure per Structural heart team within the week, likely tmw (9/6) 72F hx bronchiectasis, trachomalacia on chronic steroids, DM, pAfib on Eliquis, colorectal ca yrs ago s/p colostomy, recent admission for parainfluenza PNA on various antibiotics. admitted to medicine for exacerbation of bronchiectasis that has since resolved s/p completed 7 days linezolid and ertapenem. Anemic, likely hemolytic from severe aortic stenosis according to heme. SAFIA completed 8/30. Gated CT per structural heart completed and plans for TAVR/MEL procedure per Structural heart team today - premedicated and going to OR.

## 2023-09-06 NOTE — PRE-ANESTHESIA EVALUATION ADULT - NSANTHAIRWAYFT_ENT_ALL_CORE
Good mouth opening, normal neck ROM
Dentures removed  TM distance > 6cm  Interincisor distance <2 finger breadths  Neck circumference >16cm  Moon facies

## 2023-09-06 NOTE — PROVIDER CONTACT NOTE (OTHER) - SITUATION
Blood glucose level less than 70
Pt fingerstick is 99; she has 32 units of insulin lantus due at bedtime
Pt is refusing 16 units of premeal insuline.   Blood sugar is 81 this morning.
pt refuse to have FS at dinner time due to pt was nt eating dinner
unable to get morning labs

## 2023-09-06 NOTE — PROVIDER CONTACT NOTE (OTHER) - ACTION/TREATMENT ORDERED:
Dr Downing aware
pt has FS AC/HS will recheck at 2200
MD said okay to give patient food and to recheck blood glucose in 15 minutes. Patient had some snacks as well as juice. Blood glucose 112 when rechecked after 15 minutes. MD notified.
leave message for phlebotomy and inform day nurse to attempt getting labs as soon as possible
MD made aware; MD stated ok to give 32unit insulin lantus & "endocrine is managing case. Pt has adrenal insufficiency and will be getting steroid at night which will raise sugar, also pt is eating"

## 2023-09-06 NOTE — PROGRESS NOTE ADULT - SUBJECTIVE AND OBJECTIVE BOX
CC: Patient is a 72y old  Female who presents with a chief complaint of sob (06 Sep 2023 16:44)      INTERVAL EVENTS: ERIKA    SUBJECTIVE / INTERVAL HPI: Patient seen and examined at bedside.     REVIEW OF SYSTEMS:    CONSTITUTIONAL: No weakness, fevers or chills  EYES/ENT: No visual changes;  No vertigo or throat pain   RESPIRATORY: +Cough w/phlegm, wheezing, No shortness of breath  CARDIOVASCULAR: No chest pain or palpitations  GASTROINTESTINAL: No abdominal or epigastric pain. No nausea, vomiting, or hematemesis; No diarrhea or constipation.  NEUROLOGICAL: No numbness or weakness      VITAL SIGNS:  Vital Signs Last 24 Hrs  T(C): 35.2 (06 Sep 2023 18:20), Max: 36.8 (05 Sep 2023 21:44)  T(F): 95.4 (06 Sep 2023 18:20), Max: 98.3 (05 Sep 2023 21:44)  HR: 63 (06 Sep 2023 18:20) (63 - 84)  BP: 133/79 (06 Sep 2023 15:49) (112/74 - 133/79)  BP(mean): --  RR: 16 (06 Sep 2023 18:20) (16 - 18)  SpO2: 99% (06 Sep 2023 18:20) (97% - 99%)    Parameters below as of 06 Sep 2023 18:20  Patient On (Oxygen Delivery Method): room air          09-05-23 @ 07:01  -  09-06-23 @ 07:00  --------------------------------------------------------  IN: 752 mL / OUT: 0 mL / NET: 752 mL    09-06-23 @ 07:01  -  09-06-23 @ 19:08  --------------------------------------------------------  IN: 20 mL / OUT: 0 mL / NET: 20 mL        PHYSICAL EXAM:    General: NAD, sitting up in bed  Eyes: PERRLA, EOMI, Sclera anicteric   Cardiovascular: +S1/S2; RRR  Respiratory: CTA B/L; no W/R/R  Gastrointestinal: soft, NT/ND  Extremities: WWP; no edema, clubbing or cyanosis  Neurological: AAOx3; no focal deficits  Psych: "excited for my procedure finally" Mood: Affect: Congruent and Appropriate     MEDICATIONS:  MEDICATIONS  (STANDING):  chlorhexidine 0.12% Liquid 5 milliLiter(s) Oral Mucosa two times a day  diphenhydrAMINE 50 milliGRAM(s) Oral once  heparin   Injectable 5000 Unit(s) SubCutaneous every 8 hours  predniSONE   Tablet 50 milliGRAM(s) Oral once  sodium chloride 0.9%. 1000 milliLiter(s) (10 mL/Hr) IV Continuous <Continuous>  vancomycin  IVPB 1000 milliGRAM(s) IV Intermittent once    MEDICATIONS  (PRN):      ALLERGIES:  Allergies    tetanus toxoid (Short breath)  cefepime (Anaphylaxis)  penicillin (Anaphylaxis)  Avelox (Short breath; Pruritus)  Dilaudid (Short breath)  codeine (Short breath)  aspirin (Short breath)  iodine (Short breath; Swelling)  Valium (Short breath)  shellfish (Anaphylaxis)    Intolerances        LABS:                        8.4    13.05 )-----------( 240      ( 06 Sep 2023 18:40 )             29.5     09-06    136  |  102  |  25<H>  ----------------------------<  378<H>  5.3   |  21<L>  |  0.67    Ca    8.8      06 Sep 2023 09:32  Phos  2.7     09-06  Mg     2.2     09-06    TPro  5.8<L>  /  Alb  3.6  /  TBili  0.8  /  DBili  x   /  AST  59<H>  /  ALT  24  /  AlkPhos  69  09-06    PT/INR - ( 06 Sep 2023 18:40 )   PT: 11.1 sec;   INR: 1.06 ratio         PTT - ( 06 Sep 2023 18:40 )  PTT:27.6 sec  Urinalysis Basic - ( 06 Sep 2023 09:32 )    Color: x / Appearance: x / SG: x / pH: x  Gluc: 378 mg/dL / Ketone: x  / Bili: x / Urobili: x   Blood: x / Protein: x / Nitrite: x   Leuk Esterase: x / RBC: x / WBC x   Sq Epi: x / Non Sq Epi: x / Bacteria: x      CAPILLARY BLOOD GLUCOSE      POCT Blood Glucose.: 281 mg/dL (06 Sep 2023 12:34)      RADIOLOGY & ADDITIONAL TESTS: Reviewed.

## 2023-09-06 NOTE — PROGRESS NOTE ADULT - ASSESSMENT
72 F w/h/o uncontrolled T2DM (A1C 9.4%) on basal/bolus insulin PTA. Unknown DM complications. Also COPD, secondary adrenal Insufficiency on chronic steroids, colorectal cancer s/p resection (colostomy bag), Chronic A fib on Eliquis, and tracheomalacia and multiple intubations, type A aortic dissection s/p aortic dissection repair on 9/07/22, sepsis. Pt well known to this provider from prior admissions. Here with SOB> treated for PNA and on Prednisolone  16mg/day for AI.  Also found to have anemia and severe aortic stenosis> hematology and ct surgery following pt.  Endocrine consulted for assistance with uncontrolled DM. Pt going for surgery today and receiving Prednisone 50mg x 3 for procedure. However, BG has been above goal even though all insulin doses were increased. Expecting prednisone to clear in next 12 to 16 hours. If pt going to ICU might benefit from insulin drip to keep BG goal 100-180mg/dl.   Need to restart MTP 16mg tomorrow after procedure for AI     Will start Freestyle Luis E 3 if pt going home. Needs keshia on phone but pt states she wants daughter to do it.

## 2023-09-06 NOTE — PROGRESS NOTE ADULT - ATTENDING SUPERVISION STATEMENT
Fellow
Resident

## 2023-09-06 NOTE — PROVIDER CONTACT NOTE (OTHER) - REASON
refusal to have fs at dinner time
Pt fingerstick is 99
Pt refusing premeal insuline
Unable to get labs
Blood glucose level less than 70

## 2023-09-06 NOTE — PROVIDER CONTACT NOTE (OTHER) - RECOMMENDATIONS
will leave message for phlebotomy and inform day nurse to attempt getting labs as soon as possible
For patient to eat

## 2023-09-07 DIAGNOSIS — Z95.2 PRESENCE OF PROSTHETIC HEART VALVE: ICD-10-CM

## 2023-09-07 LAB
-  AMIKACIN: SIGNIFICANT CHANGE UP
-  AMOXICILLIN/CLAVULANIC ACID: SIGNIFICANT CHANGE UP
-  AMPICILLIN/SULBACTAM: SIGNIFICANT CHANGE UP
-  AMPICILLIN: SIGNIFICANT CHANGE UP
-  AZTREONAM: SIGNIFICANT CHANGE UP
-  CEFAZOLIN: SIGNIFICANT CHANGE UP
-  CEFEPIME: SIGNIFICANT CHANGE UP
-  CEFTRIAXONE: SIGNIFICANT CHANGE UP
-  CIPROFLOXACIN: SIGNIFICANT CHANGE UP
-  ERTAPENEM: SIGNIFICANT CHANGE UP
-  GENTAMICIN: SIGNIFICANT CHANGE UP
-  LEVOFLOXACIN: SIGNIFICANT CHANGE UP
-  MEROPENEM: SIGNIFICANT CHANGE UP
-  PIPERACILLIN/TAZOBACTAM: SIGNIFICANT CHANGE UP
-  TOBRAMYCIN: SIGNIFICANT CHANGE UP
-  TRIMETHOPRIM/SULFAMETHOXAZOLE: SIGNIFICANT CHANGE UP
ALBUMIN SERPL ELPH-MCNC: 3.5 G/DL — SIGNIFICANT CHANGE UP (ref 3.3–5)
ALP SERPL-CCNC: 61 U/L — SIGNIFICANT CHANGE UP (ref 40–120)
ALT FLD-CCNC: 20 U/L — SIGNIFICANT CHANGE UP (ref 10–45)
ANION GAP SERPL CALC-SCNC: 13 MMOL/L — SIGNIFICANT CHANGE UP (ref 5–17)
APTT BLD: 26.7 SEC — SIGNIFICANT CHANGE UP (ref 24.5–35.6)
AST SERPL-CCNC: 37 U/L — SIGNIFICANT CHANGE UP (ref 10–40)
BILIRUB SERPL-MCNC: 0.3 MG/DL — SIGNIFICANT CHANGE UP (ref 0.2–1.2)
BUN SERPL-MCNC: 17 MG/DL — SIGNIFICANT CHANGE UP (ref 7–23)
CALCIUM SERPL-MCNC: 8.5 MG/DL — SIGNIFICANT CHANGE UP (ref 8.4–10.5)
CHLORIDE SERPL-SCNC: 107 MMOL/L — SIGNIFICANT CHANGE UP (ref 96–108)
CHROM ANALY OVERALL INTERP SPEC-IMP: SIGNIFICANT CHANGE UP
CO2 SERPL-SCNC: 22 MMOL/L — SIGNIFICANT CHANGE UP (ref 22–31)
CREAT SERPL-MCNC: 0.58 MG/DL — SIGNIFICANT CHANGE UP (ref 0.5–1.3)
CULTURE RESULTS: SIGNIFICANT CHANGE UP
EGFR: 95 ML/MIN/1.73M2 — SIGNIFICANT CHANGE UP
GLUCOSE BLDC GLUCOMTR-MCNC: 166 MG/DL — HIGH (ref 70–99)
GLUCOSE BLDC GLUCOMTR-MCNC: 180 MG/DL — HIGH (ref 70–99)
GLUCOSE BLDC GLUCOMTR-MCNC: 263 MG/DL — HIGH (ref 70–99)
GLUCOSE BLDC GLUCOMTR-MCNC: 274 MG/DL — HIGH (ref 70–99)
GLUCOSE BLDC GLUCOMTR-MCNC: 284 MG/DL — HIGH (ref 70–99)
GLUCOSE BLDC GLUCOMTR-MCNC: 334 MG/DL — HIGH (ref 70–99)
GLUCOSE BLDC GLUCOMTR-MCNC: 87 MG/DL — SIGNIFICANT CHANGE UP (ref 70–99)
GLUCOSE SERPL-MCNC: 205 MG/DL — HIGH (ref 70–99)
HCT VFR BLD CALC: 28.9 % — LOW (ref 34.5–45)
HGB BLD-MCNC: 8.6 G/DL — LOW (ref 11.5–15.5)
INR BLD: 1.01 RATIO — SIGNIFICANT CHANGE UP (ref 0.85–1.18)
MAGNESIUM SERPL-MCNC: 2.4 MG/DL — SIGNIFICANT CHANGE UP (ref 1.6–2.6)
MCHC RBC-ENTMCNC: 23.6 PG — LOW (ref 27–34)
MCHC RBC-ENTMCNC: 29.8 GM/DL — LOW (ref 32–36)
MCV RBC AUTO: 79.2 FL — LOW (ref 80–100)
METHOD TYPE: SIGNIFICANT CHANGE UP
NRBC # BLD: 0 /100 WBCS — SIGNIFICANT CHANGE UP (ref 0–0)
ORGANISM # SPEC MICROSCOPIC CNT: SIGNIFICANT CHANGE UP
ORGANISM # SPEC MICROSCOPIC CNT: SIGNIFICANT CHANGE UP
PHOSPHATE SERPL-MCNC: 3 MG/DL — SIGNIFICANT CHANGE UP (ref 2.5–4.5)
PLATELET # BLD AUTO: 150 K/UL — SIGNIFICANT CHANGE UP (ref 150–400)
POTASSIUM SERPL-MCNC: 5 MMOL/L — SIGNIFICANT CHANGE UP (ref 3.5–5.3)
POTASSIUM SERPL-SCNC: 5 MMOL/L — SIGNIFICANT CHANGE UP (ref 3.5–5.3)
PROT SERPL-MCNC: 5.3 G/DL — LOW (ref 6–8.3)
PROTHROM AB SERPL-ACNC: 11.1 SEC — SIGNIFICANT CHANGE UP (ref 9.5–13)
RBC # BLD: 3.65 M/UL — LOW (ref 3.8–5.2)
RBC # FLD: 35.6 % — HIGH (ref 10.3–14.5)
SODIUM SERPL-SCNC: 142 MMOL/L — SIGNIFICANT CHANGE UP (ref 135–145)
SPECIMEN SOURCE: SIGNIFICANT CHANGE UP
VANCOMYCIN TROUGH SERPL-MCNC: 5.6 UG/ML — LOW (ref 10–20)
WBC # BLD: 11.73 K/UL — HIGH (ref 3.8–10.5)
WBC # FLD AUTO: 11.73 K/UL — HIGH (ref 3.8–10.5)

## 2023-09-07 PROCEDURE — 99232 SBSQ HOSP IP/OBS MODERATE 35: CPT

## 2023-09-07 PROCEDURE — 99291 CRITICAL CARE FIRST HOUR: CPT

## 2023-09-07 PROCEDURE — 71045 X-RAY EXAM CHEST 1 VIEW: CPT | Mod: 26

## 2023-09-07 RX ORDER — BENZOCAINE AND MENTHOL 5; 1 G/100ML; G/100ML
1 LIQUID ORAL
Refills: 0 | Status: DISCONTINUED | OUTPATIENT
Start: 2023-09-07 | End: 2023-09-10

## 2023-09-07 RX ORDER — DEXTROSE 50 % IN WATER 50 %
15 SYRINGE (ML) INTRAVENOUS ONCE
Refills: 0 | Status: DISCONTINUED | OUTPATIENT
Start: 2023-09-07 | End: 2023-09-10

## 2023-09-07 RX ORDER — TRAMADOL HYDROCHLORIDE 50 MG/1
25 TABLET ORAL ONCE
Refills: 0 | Status: DISCONTINUED | OUTPATIENT
Start: 2023-09-07 | End: 2023-09-07

## 2023-09-07 RX ORDER — DEXTROSE 50 % IN WATER 50 %
12.5 SYRINGE (ML) INTRAVENOUS ONCE
Refills: 0 | Status: DISCONTINUED | OUTPATIENT
Start: 2023-09-07 | End: 2023-09-10

## 2023-09-07 RX ORDER — DEXTROSE 50 % IN WATER 50 %
25 SYRINGE (ML) INTRAVENOUS ONCE
Refills: 0 | Status: DISCONTINUED | OUTPATIENT
Start: 2023-09-07 | End: 2023-09-10

## 2023-09-07 RX ORDER — SODIUM CHLORIDE 9 MG/ML
1000 INJECTION, SOLUTION INTRAVENOUS
Refills: 0 | Status: DISCONTINUED | OUTPATIENT
Start: 2023-09-07 | End: 2023-09-10

## 2023-09-07 RX ORDER — SODIUM CHLORIDE 9 MG/ML
3 INJECTION INTRAMUSCULAR; INTRAVENOUS; SUBCUTANEOUS EVERY 8 HOURS
Refills: 0 | Status: DISCONTINUED | OUTPATIENT
Start: 2023-09-07 | End: 2023-09-10

## 2023-09-07 RX ORDER — FAMOTIDINE 10 MG/ML
20 INJECTION INTRAVENOUS
Refills: 0 | Status: DISCONTINUED | OUTPATIENT
Start: 2023-09-07 | End: 2023-09-10

## 2023-09-07 RX ORDER — WARFARIN SODIUM 2.5 MG/1
5 TABLET ORAL ONCE
Refills: 0 | Status: COMPLETED | OUTPATIENT
Start: 2023-09-07 | End: 2023-09-07

## 2023-09-07 RX ORDER — INSULIN LISPRO 100/ML
18 VIAL (ML) SUBCUTANEOUS
Refills: 0 | Status: DISCONTINUED | OUTPATIENT
Start: 2023-09-07 | End: 2023-09-10

## 2023-09-07 RX ORDER — GLUCAGON INJECTION, SOLUTION 0.5 MG/.1ML
1 INJECTION, SOLUTION SUBCUTANEOUS ONCE
Refills: 0 | Status: DISCONTINUED | OUTPATIENT
Start: 2023-09-07 | End: 2023-09-10

## 2023-09-07 RX ORDER — INSULIN LISPRO 100/ML
VIAL (ML) SUBCUTANEOUS AT BEDTIME
Refills: 0 | Status: DISCONTINUED | OUTPATIENT
Start: 2023-09-07 | End: 2023-09-08

## 2023-09-07 RX ORDER — INSULIN LISPRO 100/ML
VIAL (ML) SUBCUTANEOUS
Refills: 0 | Status: DISCONTINUED | OUTPATIENT
Start: 2023-09-07 | End: 2023-09-10

## 2023-09-07 RX ORDER — INSULIN LISPRO 100/ML
16 VIAL (ML) SUBCUTANEOUS
Refills: 0 | Status: DISCONTINUED | OUTPATIENT
Start: 2023-09-07 | End: 2023-09-10

## 2023-09-07 RX ORDER — INSULIN GLARGINE 100 [IU]/ML
32 INJECTION, SOLUTION SUBCUTANEOUS AT BEDTIME
Refills: 0 | Status: DISCONTINUED | OUTPATIENT
Start: 2023-09-07 | End: 2023-09-08

## 2023-09-07 RX ORDER — INSULIN LISPRO 100/ML
15 VIAL (ML) SUBCUTANEOUS
Refills: 0 | Status: DISCONTINUED | OUTPATIENT
Start: 2023-09-07 | End: 2023-09-08

## 2023-09-07 RX ADMIN — Medication 250 MILLIGRAM(S): at 18:42

## 2023-09-07 RX ADMIN — INSULIN GLARGINE 32 UNIT(S): 100 INJECTION, SOLUTION SUBCUTANEOUS at 21:58

## 2023-09-07 RX ADMIN — Medication 18 UNIT(S): at 19:00

## 2023-09-07 RX ADMIN — MEXILETINE HYDROCHLORIDE 200 MILLIGRAM(S): 150 CAPSULE ORAL at 00:46

## 2023-09-07 RX ADMIN — TIOTROPIUM BROMIDE 2 PUFF(S): 18 CAPSULE ORAL; RESPIRATORY (INHALATION) at 13:51

## 2023-09-07 RX ADMIN — WARFARIN SODIUM 5 MILLIGRAM(S): 2.5 TABLET ORAL at 01:39

## 2023-09-07 RX ADMIN — CLOPIDOGREL BISULFATE 75 MILLIGRAM(S): 75 TABLET, FILM COATED ORAL at 00:56

## 2023-09-07 RX ADMIN — MEXILETINE HYDROCHLORIDE 200 MILLIGRAM(S): 150 CAPSULE ORAL at 21:15

## 2023-09-07 RX ADMIN — CHLORHEXIDINE GLUCONATE 5 MILLILITER(S): 213 SOLUTION TOPICAL at 18:41

## 2023-09-07 RX ADMIN — Medication 250 MILLIGRAM(S): at 06:20

## 2023-09-07 RX ADMIN — HEPARIN SODIUM 5000 UNIT(S): 5000 INJECTION INTRAVENOUS; SUBCUTANEOUS at 05:21

## 2023-09-07 RX ADMIN — Medication 8: at 07:45

## 2023-09-07 RX ADMIN — TRAMADOL HYDROCHLORIDE 25 MILLIGRAM(S): 50 TABLET ORAL at 21:14

## 2023-09-07 RX ADMIN — ATORVASTATIN CALCIUM 20 MILLIGRAM(S): 80 TABLET, FILM COATED ORAL at 00:46

## 2023-09-07 RX ADMIN — TRAMADOL HYDROCHLORIDE 25 MILLIGRAM(S): 50 TABLET ORAL at 21:44

## 2023-09-07 RX ADMIN — Medication 600 MILLIGRAM(S): at 18:40

## 2023-09-07 RX ADMIN — MEXILETINE HYDROCHLORIDE 200 MILLIGRAM(S): 150 CAPSULE ORAL at 15:24

## 2023-09-07 RX ADMIN — HEPARIN SODIUM 5000 UNIT(S): 5000 INJECTION INTRAVENOUS; SUBCUTANEOUS at 15:25

## 2023-09-07 RX ADMIN — ATORVASTATIN CALCIUM 20 MILLIGRAM(S): 80 TABLET, FILM COATED ORAL at 21:15

## 2023-09-07 RX ADMIN — BUDESONIDE AND FORMOTEROL FUMARATE DIHYDRATE 2 PUFF(S): 160; 4.5 AEROSOL RESPIRATORY (INHALATION) at 05:39

## 2023-09-07 RX ADMIN — BENZOCAINE AND MENTHOL 1 LOZENGE: 5; 1 LIQUID ORAL at 03:10

## 2023-09-07 RX ADMIN — Medication 16 MILLIGRAM(S): at 13:48

## 2023-09-07 RX ADMIN — SODIUM CHLORIDE 3 MILLILITER(S): 9 INJECTION INTRAMUSCULAR; INTRAVENOUS; SUBCUTANEOUS at 21:34

## 2023-09-07 RX ADMIN — FAMOTIDINE 20 MILLIGRAM(S): 10 INJECTION INTRAVENOUS at 18:46

## 2023-09-07 RX ADMIN — Medication 16 UNIT(S): at 13:20

## 2023-09-07 RX ADMIN — Medication 6: at 21:59

## 2023-09-07 RX ADMIN — Medication 3 MILLILITER(S): at 18:41

## 2023-09-07 RX ADMIN — SODIUM CHLORIDE 3 MILLILITER(S): 9 INJECTION INTRAMUSCULAR; INTRAVENOUS; SUBCUTANEOUS at 15:08

## 2023-09-07 RX ADMIN — Medication 3 MILLILITER(S): at 05:40

## 2023-09-07 RX ADMIN — HEPARIN SODIUM 5000 UNIT(S): 5000 INJECTION INTRAVENOUS; SUBCUTANEOUS at 21:14

## 2023-09-07 RX ADMIN — Medication 600 MILLIGRAM(S): at 05:16

## 2023-09-07 RX ADMIN — Medication 6: at 12:16

## 2023-09-07 RX ADMIN — Medication 3 MILLILITER(S): at 13:51

## 2023-09-07 RX ADMIN — WARFARIN SODIUM 5 MILLIGRAM(S): 2.5 TABLET ORAL at 21:14

## 2023-09-07 RX ADMIN — Medication 2: at 00:47

## 2023-09-07 RX ADMIN — CHLORHEXIDINE GLUCONATE 5 MILLILITER(S): 213 SOLUTION TOPICAL at 05:16

## 2023-09-07 RX ADMIN — MEXILETINE HYDROCHLORIDE 200 MILLIGRAM(S): 150 CAPSULE ORAL at 05:16

## 2023-09-07 RX ADMIN — BUDESONIDE AND FORMOTEROL FUMARATE DIHYDRATE 2 PUFF(S): 160; 4.5 AEROSOL RESPIRATORY (INHALATION) at 18:40

## 2023-09-07 NOTE — PHYSICAL THERAPY INITIAL EVALUATION ADULT - BALANCE TRAINING, PT EVAL
GOAL: Patient will demonstrate an increase in static/dynamic balance in sitting/standing where deficient by at least 1 grade to facilitate greater independence during functional mobility and ADL's.
GOAL: Static/dynamic standing balance to good with or without UE support, 4 wks

## 2023-09-07 NOTE — PHYSICAL THERAPY INITIAL EVALUATION ADULT - GAIT TRAINING, PT EVAL
Patient will independently ambulate 300 ft with appropriate AD within 2 weeks
GOAL: Ambulation with rolling walker 100 feet independent, 4 wks

## 2023-09-07 NOTE — PROGRESS NOTE ADULT - PROBLEM SELECTOR PLAN 2
Test BG ac and hs and 2am if eating. Q6H while NPO  -restart Lantus 32 units sq hs   - restart  Admelog 15-16-18 units qac   Hold if NPO or eating less than 50% of meals.  -restart  moderate correction scale ac meals and moderate correction hs and 2am.      Plan to d/c on basal bolus.   Outpt. endo follow-up. Dr Saavedra  Outpt. optho, podiatry, micro/cr  Consider Tavares Kimbrough 3  upon discharge

## 2023-09-07 NOTE — PROGRESS NOTE ADULT - ASSESSMENT
72 F w/h/o uncontrolled T2DM (A1C 9.4%) on basal/bolus insulin PTA. Unknown DM complications. Also COPD, secondary adrenal Insufficiency on chronic steroids, colorectal cancer s/p resection (colostomy bag), Chronic A fib on Eliquis, and tracheomalacia and multiple intubations, type A aortic dissection s/p aortic dissection repair on 9/07/22, sepsis. Pt well known to this provider from prior admissions. Here with SOB> treated for PNA and on Prednisolone  16mg/day for AI.  Also found to have anemia and severe aortic stenosis> hematology and ct surgery following pt.  Endocrine consulted for assistance with uncontrolled DM. S/p CTSx 9/6 with BG above goal , noted patient has not received lantus in over 24 hours   9/7 TAVR #23 magaly, no conduction abnormality timi procedure.  Freestyle Luis E 3 if pt going home. Needs keshia on phone but pt states she wants daughter to do it.

## 2023-09-07 NOTE — PROGRESS NOTE ADULT - PROBLEM SELECTOR PLAN 4
On chronic steroids at home > medrol 8mg qd from admission in 2022  Per pt she has been sick this year with multiple hospitalizations requiring pulse steroids. Was on Methylprednisolone 28mg PTA.   -Pt on slow titration back to Medrol 8mg qd  -c/w MTP 16mg   - Will need stress steroids if acutely ill or for OR. No need for stress steroid dose at this time since pt received Prednisone 50mg x3 prior to OR

## 2023-09-07 NOTE — PHYSICAL THERAPY INITIAL EVALUATION ADULT - PLANNED THERAPY INTERVENTIONS, PT EVAL
GOAL: Pt will negotiate up/down 10 steps with handrail ascending independently in 2 weeks/balance training/bed mobility training/gait training/strengthening/transfer training
stair training- GOAL: 1 flight ascending using seated bumping pattern, descending with CATARINA UE's on single rail, 4 wks/balance training/gait training/transfer training

## 2023-09-07 NOTE — PHYSICAL THERAPY INITIAL EVALUATION ADULT - CRITERIA FOR SKILLED THERAPEUTIC INTERVENTIONS
impairments found/rehab potential/therapy frequency/predicted duration of therapy intervention/anticipated discharge recommendation
impairments found

## 2023-09-07 NOTE — PHYSICAL THERAPY INITIAL EVALUATION ADULT - GAIT DISTANCE, PT EVAL
75 feet
15 feet x2 in room, around the bed and back. Refused to ambulate outside of room. Standing rest break ~1min 2/2 SOB

## 2023-09-07 NOTE — PROGRESS NOTE ADULT - NUTRITIONAL ASSESSMENT
Diet, Consistent Carbohydrate/No Snacks (09-07-23 @ 07:43) [Active]  Diet, Regular (09-06-23 @ 18:38) [Available for Activation]  Diet, Clear Liquid (09-06-23 @ 18:38) [Available for Activation]

## 2023-09-07 NOTE — PROGRESS NOTE ADULT - PROBLEM SELECTOR PLAN 5
On chronic steroids at home > medrol 8mg qd from admission in 2022  Per pt she has been sick this year with multiple hospitalizations requiring pulse steroids. Was on Methylprednisolone 28mg PTA.   -Pt on slow titration back to Medrol 8mg qd  -c/w MTP 16mg   - Will need stress steroids if acutely ill or for OR. No need for stress steroid dose at this time since pt received Prednisone 50mg x3 prior to OR now on medrol 16 qd

## 2023-09-07 NOTE — PHYSICAL THERAPY INITIAL EVALUATION ADULT - PERTINENT HX OF CURRENT PROBLEM, REHAB EVAL
72 y/oF admitted 8/11 found to have bronchial secretions on CT, admit to medicine for exacerbation of bronchiectasis. C/o SOB which started 2 and half weeks ago with dyspnea on exertion, increased sputum production and running nose. She also endorsed sweatiness, chill, abd pain, denied fever, n/v/c/d, sick contact. he is up to date with vaccines. she has had chronic shortness of breath and cough for years. She had a recent hospitalization at an outside hospital  for acute respiratory failure with hypoxia secondary to asthma/COPD exacerbation and bronchopneumonia 6/5/2023 - 6/16/2023), She was found to have ESBL/Proteus/yeast in sputum culture and was treated with ertapenem/Benadryl/vancomycin/aztreonam and methylprednisolone.   She was discharged home with a midline and completed a course of antibiotics ertapenem (last dose 06/25).Dr. Allison also prescribed tobramycin after discharge,  and her methylprednisolone dose was reduced from 32 to 28 mg po daily from last monday to this monday. She states since dose reduction she has had worsened dyspnea. when she called Dr. Allison's office, he urged the patient to come to the ED for further management. Of note had chronic dyspnea/severe asthma since childhood. She states she had a severe sinus infection requiring surgery at ?10 yo and since then has had difficulties with breathing. She has been on chronic steroids for over 20 years per report.. She uses Breo, Spiriva, albuterol neb and a cough assist device. CT showed new mucous secretions in the bronchus intermedius. Unchanged infectious/inflammatory small airways disease in the right middle/lower lobes with multifocal small tree-in-bud nodules. Status post ascending aortic and aortic valve replacement, with residual dissection flap better seen on the prior study. Stable indeterminate small left renal nodule and numerous pancreatic cysts. In the ED, she was on Duonebs, LR 500cc, ertapenem adn benadryl was given as well. +leukocytosis. As per ID consult, no evidence of airspace disease so more of a bronchiectasis exacerbation, recent sputums with ESBL proteus so having tolerated ertapenem will restart, ertapenem started 8/12 PMH asthma on chronic steroids, bronchiectasis s/p surgery, allergic rhinitis,  recurrent PNA, PND, tracheomalacia s/p tracheoplasty, ESBL proteus infections (sputum),  diabetes, paroxysmal A-fib on Eliquis, colorectal cancer many years ago status post colostomy
71 yo PMHx  asthma on chronic steroids, bronchiectasis s/p surgery, allergic rhinitis,  recurrent PNA, PND, tracheomalacia s/p tracheoplasty, ESBL proteus infections (sputum),  diabetes, paroxysmal A-fib on Eliquis, colorectal cancer many years ago status post colostomy. Now s/p TAVR on 9/6.

## 2023-09-07 NOTE — PROGRESS NOTE ADULT - NSSCRIBESTATEMENT_GEN_ALL_CORE
Onset: yesterday   Location / description: Spoke with pt who stated she is still having headaches from after her RTX infusion  Pt wakes up in pain from headache  Occasional lightheadedness   Left side of neck is stiff   Ringing in ears more left then right -past 2-3 days   No fever-feels hot   Dr. White advised she be seen in ER or Urgent care if headaches persisted yesterday   Precipitating Factors: unk  Pain Scale (1 - 10), 10 highest: 7/10  Associated Symptoms: ringing in ears   What improves/worsens symptoms: Tylenol and Excedrin migraine   Symptom specific medications: TRX infusion   LMP : No LMP recorded. Patient is not currently having periods (Reason: Intrauterine Device).  Are you pregnant or breast feeding: no   Recent Care: 09/27/17 Hemo/onc    Reason for Disposition  • Patient sounds very sick or weak to the triager    Protocols used: HEADACHE-A-AH      
Regarding: headaches,ringing ears  ----- Message from Maci Maria sent at 9/30/2017  6:44 PM CDT -----  Patient Name: Heidi Menendez  Specialist or PCP:Sandra Christensen MD   Pregnant (If Yes, how long?):n/a  Symptoms:since Wednesday after pt had infusion treatment, pt has been having headaches on and off, pt ears has also been ringing   Call Back #:[phone]  Is the patient’s permanent residence located in [place], [place], or a [facility]? Yes [facility] [id number]  Call Center Account #:[id number]      
ACP (NP/PA)

## 2023-09-07 NOTE — PHYSICAL THERAPY INITIAL EVALUATION ADULT - ADDITIONAL COMMENTS
Pt lives in private house with daughter who works during the day. 4 stairs to enter, 14 stairs to bedroom on 2nd floor. States ambulates in the house by holding onto walls, but stays in bed most of the day. Has rollator for community mobility. States there is no room in the house for the rollator. Ascends stairs in sitting (bumping up) and descends with CATARINA hands on single rail, standing sideways. Has seat in shower, daughter helps with showering.
Pt lives in private house with daughter who works during the day. 4 stairs to enter, 14 stairs to bedroom on 2nd floor. States ambulates in the house by holding onto walls, but stays in bed most of the day. Has rollator for community mobility. States there is no room in the house for the rollator. Ascends stairs in sitting (bumping up) and descends with CATARINA hands on single rail, standing sideways. Has seat in shower, daughter helps with showering.

## 2023-09-07 NOTE — PROGRESS NOTE ADULT - SUBJECTIVE AND OBJECTIVE BOX
seen earlier today     Chief Complaint: Diabetes Mellitus follow up    INTERVAL HX: s/p CTSx patient tx from ctu to step down, noted has not received MTP yet today discussed with floor provider . also has not received lantus in over 24 hours now with BG in 300s , reports good appetite       Review of Systems:  General: As above  GI: No nausea, vomiting  Endocrine: no  S&Sx of hypoglycemia    Allergies    tetanus toxoid (Short breath)  cefepime (Anaphylaxis)  penicillin (Anaphylaxis)  Avelox (Short breath; Pruritus)  Dilaudid (Short breath)  codeine (Short breath)  aspirin (Short breath)  iodine (Short breath; Swelling)  Valium (Short breath)  shellfish (Anaphylaxis)    Intolerances      MEDICATIONS  (STANDING):  albuterol/ipratropium for Nebulization 3 milliLiter(s) Nebulizer every 6 hours  atorvastatin 20 milliGRAM(s) Oral at bedtime  budesonide 160 MICROgram(s)/formoterol 4.5 MICROgram(s) Inhaler 2 Puff(s) Inhalation two times a day  chlorhexidine 0.12% Liquid 5 milliLiter(s) Oral Mucosa two times a day  clopidogrel Tablet 75 milliGRAM(s) Oral daily  dextrose 5%. 1000 milliLiter(s) (100 mL/Hr) IV Continuous <Continuous>  dextrose 5%. 1000 milliLiter(s) (50 mL/Hr) IV Continuous <Continuous>  dextrose 50% Injectable 12.5 Gram(s) IV Push once  dextrose 50% Injectable 25 Gram(s) IV Push once  dextrose 50% Injectable 25 Gram(s) IV Push once  famotidine    Tablet 20 milliGRAM(s) Oral two times a day  glucagon  Injectable 1 milliGRAM(s) IntraMuscular once  guaiFENesin  milliGRAM(s) Oral every 12 hours  heparin   Injectable 5000 Unit(s) SubCutaneous every 8 hours  insulin glargine Injectable (LANTUS) 32 Unit(s) SubCutaneous at bedtime  insulin lispro (ADMELOG) corrective regimen sliding scale   SubCutaneous three times a day before meals  insulin lispro (ADMELOG) corrective regimen sliding scale   SubCutaneous at bedtime  insulin lispro (ADMELOG) corrective regimen sliding scale   SubCutaneous Before meals and at bedtime  insulin lispro Injectable (ADMELOG) 15 Unit(s) SubCutaneous before breakfast  insulin lispro Injectable (ADMELOG) 18 Unit(s) SubCutaneous before dinner  insulin lispro Injectable (ADMELOG) 16 Unit(s) SubCutaneous before lunch  methylPREDNISolone 16 milliGRAM(s) Oral daily  mexiletine 200 milliGRAM(s) Oral every 8 hours  sodium chloride 0.9% lock flush 3 milliLiter(s) IV Push every 8 hours  tiotropium 2.5 MICROgram(s) Inhaler 2 Puff(s) Inhalation daily  vancomycin  IVPB 1000 milliGRAM(s) IV Intermittent once  vancomycin  IVPB 1000 milliGRAM(s) IV Intermittent every 12 hours  warfarin 5 milliGRAM(s) Oral once      atorvastatin   20 milliGRAM(s) Oral (09-07-23 @ 00:46)    insulin lispro (ADMELOG) corrective regimen sliding scale   6 Unit(s) SubCutaneous (09-07-23 @ 12:16)   8 Unit(s) SubCutaneous (09-07-23 @ 07:45)   2 Unit(s) SubCutaneous (09-07-23 @ 00:47)    insulin lispro Injectable (ADMELOG)   16 Unit(s) SubCutaneous (09-07-23 @ 13:20)        PHYSICAL EXAM:  VITALS: T(C): 36.8 (09-07-23 @ 11:00)  T(F): 98.2 (09-07-23 @ 11:00), Max: 98.2 (09-07-23 @ 11:00)  HR: 75 (09-07-23 @ 11:00) (60 - 84)  BP: 119/71 (09-07-23 @ 11:00) (106/53 - 138/64)  RR:  (13 - 24)  SpO2:  (97% - 100%)  Wt(kg): --  GENERAL: NAD  Respiratory: Respirations unlabored   Extremities: Warm, no edema  NEURO: Alert , appropriate     LABS:  POCT Blood Glucose.: 274 mg/dL (09-07-23 @ 13:18)  POCT Blood Glucose.: 284 mg/dL (09-07-23 @ 12:06)  POCT Blood Glucose.: 334 mg/dL (09-07-23 @ 07:02)  POCT Blood Glucose.: 166 mg/dL (09-07-23 @ 00:44)  POCT Blood Glucose.: 281 mg/dL (09-06-23 @ 12:34)  POCT Blood Glucose.: 338 mg/dL (09-06-23 @ 08:55)  POCT Blood Glucose.: 250 mg/dL (09-06-23 @ 02:14)  POCT Blood Glucose.: 117 mg/dL (09-05-23 @ 22:02)  POCT Blood Glucose.: 222 mg/dL (09-05-23 @ 17:41)  POCT Blood Glucose.: 119 mg/dL (09-05-23 @ 12:55)  POCT Blood Glucose.: 200 mg/dL (09-05-23 @ 08:25)  POCT Blood Glucose.: 264 mg/dL (09-05-23 @ 01:53)  POCT Blood Glucose.: 280 mg/dL (09-04-23 @ 22:31)                          8.6    11.73 )-----------( 150      ( 07 Sep 2023 05:23 )             28.9     09-07    142  |  107  |  17  ----------------------------<  205<H>  5.0   |  22  |  0.58    Ca    8.5      07 Sep 2023 05:23  Phos  3.0     09-07  Mg     2.4     09-07    TPro  5.3<L>  /  Alb  3.5  /  TBili  0.3  /  DBili  x   /  AST  37  /  ALT  20  /  AlkPhos  61  09-07    eGFR: 95 mL/min/1.73m2 (07 Sep 2023 05:23)  eGFR: 91 mL/min/1.73m2 (06 Sep 2023 18:40)      Thyroid Function Tests:  09-06 @ 09:32 TSH 0.49 FreeT4 1.2 T3 82 Anti TPO -- Anti Thyroglobulin Ab -- TSI --      A1C with Estimated Average Glucose Result: 9.1 % (06-17-23 @ 10:27)    Estimated Average Glucose: 214 mg/dL (06-17-23 @ 10:27)        Diet, Consistent Carbohydrate/No Snacks (09-07-23 @ 07:43) [Active]  Diet, Regular (09-06-23 @ 18:38) [Available for Activation]  Diet, Clear Liquid (09-06-23 @ 18:38) [Available for Activation]

## 2023-09-07 NOTE — PROGRESS NOTE ADULT - ASSESSMENT
72 female s/p AVR/Bental September 2022 with Dr. Cabrera. Now with prosthetic Aortic Valve Stenosis, Bronchiectasis, and Anemia requiring transfusion.      9/7 Tristen TAVR #23 magaly, no conduction abnormality timi procedure.   DC planning with [facility]  Ambulate with PT  Continue Plavix  Hx of AFib, Pt. was Eliquis prior to original AVR. Evidence of HALT on pre-op TTE. Will transition to Warfarin to be monitored by primary cardiologist.   Post op TTE completed, results pending    LUISITO Shaw NP  [id number]  Available on TEAMS

## 2023-09-07 NOTE — PROGRESS NOTE ADULT - ASSESSMENT
72 F w/h/o uncontrolled T2DM (A1C 9.4%) on basal/bolus insulin PTA. Unknown DM complications. Also COPD, secondary adrenal Insufficiency on chronic steroids, colorectal cancer s/p resection (colostomy bag), Chronic A fib on Eliquis, and tracheomalacia and multiple intubations, type A aortic dissection s/p aortic dissection repair on 9/07/22, sepsis. Pt well known to this provider from prior admissions. Here with SOB> treated for PNA and on Prednisolone  16mg/day for AI.  Also found to have anemia and severe aortic stenosis> hematology and ct surgery following pt.  Endocrine consulted for assistance with uncontrolled DM. S/p CTSx 9/6 with BG above goal , noted patient has not received lantus in over 24 hours   Will start Freestyle Luis E 3 if pt going home. Needs keshia on phone but pt states she wants daughter to do it.

## 2023-09-07 NOTE — PROGRESS NOTE ADULT - SUBJECTIVE AND OBJECTIVE BOX
HPI/Interval Hx    Sitting OOB-Chair, POD #1 s/p transfemoral TAVR with Dr. Leahy and Dr. Gonsalves. No complaints, reporting improvement in shortness of breath.     MEDICATIONS  (STANDING):  albuterol/ipratropium for Nebulization 3 milliLiter(s) Nebulizer every 6 hours  atorvastatin 20 milliGRAM(s) Oral at bedtime  budesonide 160 MICROgram(s)/formoterol 4.5 MICROgram(s) Inhaler 2 Puff(s) Inhalation two times a day  chlorhexidine 0.12% Liquid 5 milliLiter(s) Oral Mucosa two times a day  clopidogrel Tablet 75 milliGRAM(s) Oral daily  dextrose 5%. 1000 milliLiter(s) (50 mL/Hr) IV Continuous <Continuous>  dextrose 5%. 1000 milliLiter(s) (100 mL/Hr) IV Continuous <Continuous>  dextrose 50% Injectable 25 Gram(s) IV Push once  dextrose 50% Injectable 25 Gram(s) IV Push once  dextrose 50% Injectable 12.5 Gram(s) IV Push once  famotidine    Tablet 20 milliGRAM(s) Oral two times a day  glucagon  Injectable 1 milliGRAM(s) IntraMuscular once  guaiFENesin  milliGRAM(s) Oral every 12 hours  heparin   Injectable 5000 Unit(s) SubCutaneous every 8 hours  insulin glargine Injectable (LANTUS) 32 Unit(s) SubCutaneous at bedtime  insulin lispro (ADMELOG) corrective regimen sliding scale   SubCutaneous three times a day before meals  insulin lispro (ADMELOG) corrective regimen sliding scale   SubCutaneous at bedtime  insulin lispro (ADMELOG) corrective regimen sliding scale   SubCutaneous Before meals and at bedtime  insulin lispro Injectable (ADMELOG) 15 Unit(s) SubCutaneous before breakfast  insulin lispro Injectable (ADMELOG) 18 Unit(s) SubCutaneous before dinner  insulin lispro Injectable (ADMELOG) 16 Unit(s) SubCutaneous before lunch  methylPREDNISolone 16 milliGRAM(s) Oral daily  mexiletine 200 milliGRAM(s) Oral every 8 hours  sodium chloride 0.9% lock flush 3 milliLiter(s) IV Push every 8 hours  tiotropium 2.5 MICROgram(s) Inhaler 2 Puff(s) Inhalation daily  vancomycin  IVPB 1000 milliGRAM(s) IV Intermittent once  vancomycin  IVPB 1000 milliGRAM(s) IV Intermittent every 12 hours  warfarin 5 milliGRAM(s) Oral once    MEDICATIONS  (PRN):  benzocaine/menthol Lozenge 1 Lozenge Oral every 2 hours PRN Sore Throat  dextrose Oral Gel 15 Gram(s) Oral once PRN Blood Glucose LESS THAN 70 milliGRAM(s)/deciliter  diphenhydrAMINE 50 milliGRAM(s) Oral every 6 hours PRN Rash and/or Itching  ondansetron Injectable 4 milliGRAM(s) IV Push every 8 hours PRN Nausea      PAST MEDICAL & SURGICAL HISTORY:  Atrial fibrillation  paroxysmal, on eliquis      Diabetes  Type 2      COPD (chronic obstructive pulmonary disease)      Adrenal insufficiency  Medrol daily for over 50 years      Aortic insufficiency  moderate AR on echo 5/3/2018      Pelvic fracture      Asthma      Tracheobronchomalacia  diagnosed 2015, s/p bronchial thermoplasty 2016 (Dr Zapien); recent bronchoscopy 6/5/2018 revealed no evidence of tracheobronchomalacia in trachea or bronchial tubes      Colorectal cancer  4/2018- last treatment , chemo and radiation      Rectal bleeding      Seizure  x 1 1/7/18      DVT (deep venous thrombosis)  15-20 years ago, took coumadin      TIA (transient ischemic attack)  multiple, last 5 years ago - presents as right-sided weakness      History of partial hysterectomy  30 years ago - fibroids      H/O total knee replacement, bilateral  5 years ago      History of sinus surgery  multiple sinus surgeries      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 by Dr Zapien      S/P bronchoscopy  6/5/2018 - [facility] (Dr Zapien) no evidence of tracheobronchomalacia in trachea or bronchial tubes      Rectal bleeding  exam under anesthesia ([facility]) 2/2018          Review of Systems  CONSTITUTIONAL: No weakness, fevers or chills  EYES/ENT: No visual changes;  No vertigo or throat pain   NECK: No pain or stiffness  RESPIRATORY: No hemoptysis; No shortness of breath at rest, (+) cough  CARDIOVASCULAR: No chest pain or palpitations, PND, orthopnea, or edema, (+) exertional dyspnea   GASTROINTESTINAL: No abdominal or epigastric pain. No nausea, vomiting, or hematemesis; No diarrhea or constipation. No melena or hematochezia.  GENITOURINARY: No dysuria, frequency or hematuria  NEUROLOGICAL: No numbness or weakness  SKIN: No itching, burning, rashes, or lesions   All other review of systems is negative unless indicated above.    Physical Exam  General: A/ox 3, No acute Distress  Neck: Supple, NO JVD  Cardiac: S1 S2, No M/R/G  Pulmonary: Diffuse expiratory wheezing, ronchi clears with cough, diminished at bases bilaterally   Abdomen: Soft, Non -tender, +BS x 4 quads  Extremities: No Rashes, No edema, Bilateral groin dressing sites with small amount of serosanguineous drainage.   Neuro: A/o x 3, No focal deficits    Vital Signs Last 24 Hrs  T(C): 36.8 (07 Sep 2023 11:00), Max: 36.8 (07 Sep 2023 11:00)  T(F): 98.2 (07 Sep 2023 11:00), Max: 98.2 (07 Sep 2023 11:00)  HR: 75 (07 Sep 2023 11:00) (60 - 84)  BP: 119/71 (07 Sep 2023 11:00) (106/53 - 138/64)  BP(mean): 87 (07 Sep 2023 11:00) (77 - 92)  RR: 20 (07 Sep 2023 11:00) (13 - 24)  SpO2: 97% (07 Sep 2023 11:00) (97% - 100%)    Parameters below as of 07 Sep 2023 11:00  Patient On (Oxygen Delivery Method): room air                            8.6    11.73 )-----------( 150      ( 07 Sep 2023 05:23 )             28.9     09-07    142  |  107  |  17  ----------------------------<  205<H>  5.0   |  22  |  0.58    Ca    8.5      07 Sep 2023 05:23  Phos  3.0     09-07  Mg     2.4     09-07    TPro  5.3<L>  /  Alb  3.5  /  TBili  0.3  /  DBili  x   /  AST  37  /  ALT  20  /  AlkPhos  61  09-07      Telemetry: NSR 70's    EKG: < from: 12 Lead ECG (09.05.23 @ 16:47) >  Ventricular Rate 76 BPM    Atrial Rate 76 BPM    P-R Interval 184 ms    QRS Duration 116 ms    Q-T Interval 390 ms    QTC Calculation(Bazett) 438 ms    P Axis -22 degrees    R Axis -65 degrees    T Axis 11 degrees    Diagnosis Line NORMAL SINUS RHYTHM  INCOMPLETE RIGHT BUNDLE BRANCH BLOCK  LEFT ANTERIOR FASCICULAR BLOCK  MINIMAL VOLTAGE CRITERIA FOR LVH, MAY BE NORMAL VARIANT ( Kentrell product )  NONSPECIFIC ST ABNORMALITY  ABNORMAL ECG  WHEN COMPARED WITH ECG OF 8/12//23  Confirmed by MD WILLIAM, MARY ([id number]) on 9/7/2023 2:55:36 PM    < end of copied text >      Other  < from: Intra-Operative Transesophageal Echo W or WO Ultrasound Enhancing Agent (09.06.23 @ 15:43) >  PROCEDURAL TRANSTHORACIC ECHOCARDIOGRAM REPORT  ________________________________________________________________________________                                      _______       Pt. Name:       RAYRAY MARTINEZ             Study Date:   9/6/2023                  MICHAEL  MRN:            [id number]                   Date of       1950                                               Birth:  Accession #:    [id number]                    Age:          72 years  Account#:       [id number]                Gender:       F  Heart Rate:     84 bpm                       Height:       67.00 in (170.18 cm)  Rhythm:                                      Weight:       143.31 lb (65.01 kg)  Blood Pressure: 145/60 mmHg                  BSA/BMI:   1.76 m² / 22.45 kg/m²  ________________________________________________________________________________________  Referring Physician:    SEBASTIÁN LEAHY  Interpreting Physician: Annabella Ball  Primary Sonographer:    Karen Dove    CPT:   ECHO TTE WO CON COMP W DOPP - 52930.m  Indication(s):     Nonrheumatic aortic (valve) stenosis - I35.0  Procedure:         Transthoracic echocardiogram with 2-D, M-mode and complete                     spectral and color flow Doppler.  Ordering Location: [facility]  Admission Status:  Inpatient  Study Information: Image quality for this study is fair.    _______________________________________________________________________________________     CONCLUSIONS:      1. TTE imaging and guidance were provided during valve-in-valve transcatheter aortic valve replacement in bioprosthetic valve replacement (THV-in-SHV).    ________________________________________________________________________________________     Structural Heart Procedure:  TTE imagingand guidance were provided during valve-in-valve transcatheter aortic valve replacement in bioprosthetic valve replacement (THV-in-SHV).     Procedural Transthoracic Echocardiogram TAVR: Intra-procedural TTE for TAVR under MAC.  Brief pre-procedural imaging showed no significant changes from SAFIA of 8/30/2023    A 26mm Evolut FX THV was positioned and deployed in a 21mm Inspirus bio AVR (THV-in-SHV) under flouroscopic guidance. Imaging afterwards revealed a well seated valve with normal function. The mean trans-aortic gradient= 10mmHg. There was no central or paravalvular aortic regurgitation seen. The valve was post-dilated for enlargment of the 21mm surgical valve and to improve the expansion of the 26mm Evolut FX. There was improvement in valve expansion without any new aortic regurgitation.    Images were reviewed with the structural heart team at the time of the study.     ________________________________________________________________________________________  FINDINGS:     Left Ventricle:  Normal left ventricular cavity size. Left ventricular systolic function is hyperdynamic with an ejection fraction visually estimated at 65 to 70%. There are no regional wall motion abnormalities seen.     Right Ventricle:  Normal right ventricular cavity size and normal right ventricular systolic function.     Aortic Valve:  26mm Evolut FX THV-in-21mm Inspirus bio AVR (THV-in-SHV) (valve-in-valve) is present in the aortic position. The prosthetic valve is well seated with normal function. There is no intravalvular regurgitation. There is no paravalvular regurgitation. The peak transaortic velocity is 2.11 m/s, peak transaortic gradient is 17.8 mmHg and mean transaortic gradient is 8.8 mmHg with an LVOT/aortic valve VTI ratio of 0.50. The aortic valve acceleration time is 102 msec. The aortic valve area is estimated at 1.61 cm² by the continuity equation.     Mitral Valve:  There is calcification of the mitral valve annulus. There is mild mitral regurgitation.     Tricuspid Valve:  There is no evidence of tricuspid regurgitation.     Pulmonic Valve:  There is trace pulmonic regurgitation.     Pericardium:  No pericardial effusion seen.    < end of copied text >

## 2023-09-07 NOTE — PROGRESS NOTE ADULT - PROBLEM SELECTOR PLAN 1
Test BG ac and hs and 2am if eating. Q6H while NPO  -restart Lantus 32 units sq hs   - restart  Admelog 15-16-18 units qac   Hold if NPO or eating less than 50% of meals.  -restart  moderate correction scale ac meals and moderate correction hs and 2am.    Please contact endo team with any changes on steroid doses!!   Discharge:  Will be determined according to BG/insulin needs/PO intake and steroid therapy at time of discharge.  Plan to d/c on basal bolus. Doses TBD  Outpt. endo follow-up. Dr Saavedra  Outpt. optho, podiatry, micro/cr  Make sure pt has Rx for all DM supplies and insulin/ DM meds. Consider [facility] if going home

## 2023-09-07 NOTE — PROGRESS NOTE ADULT - NUTRITIONAL ASSESSMENT
This patient has been assessed with a concern for Malnutrition and has been determined to have a diagnosis/diagnoses of Moderate protein-calorie malnutrition.    This patient is being managed with:   Diet Consistent Carbohydrate/No Snacks-  Supplement Feeding Modality:  Oral  Glucerna Shake Cans or Servings Per Day:  1       Frequency:  Three Times a day  Entered: Sep  7 2023  3:36PM

## 2023-09-07 NOTE — PROGRESS NOTE ADULT - SUBJECTIVE AND OBJECTIVE BOX
VITAL SIGNS    Telemetry:  SR 60  Vital Signs Last 24 Hrs  T(C): 36.8 (23 @ 11:00), Max: 36.8 (23 @ 11:00)  T(F): 98.2 (23 @ 11:00), Max: 98.2 (23 @ 11:00)  HR: 75 (23 @ 11:00) (60 - 84)  BP: 119/71 (23 @ 11:00) (106/53 - 138/64)  RR: 20 (23 @ 11:00) (13 - 24)  SpO2: 97% (23 @ 11:00) (97% - 100%)             @ 07:01  -   @ 07:00  --------------------------------------------------------  IN: 500 mL / OUT: 1100 mL / NET: -600 mL     @ 07:01  -   @ 17:44  --------------------------------------------------------  IN: 0 mL / OUT: 300 mL / NET: -300 mL       Daily     Daily Weight in k.1 (07 Sep 2023 00:00)  Admit Wt: Drug Dosing Weight  Height (cm): 170.2 (06 Sep 2023 15:49)  Weight (kg): 65 (06 Sep 2023 15:49)  BMI (kg/m2): 22.4 (06 Sep 2023 15:49)  BSA (m2): 1.76 (06 Sep 2023 15:49)    Bilirubin Total: 0.3 mg/dL ( @ 05:23)  Bilirubin Total: 0.6 mg/dL ( @ 18:40)    CAPILLARY BLOOD GLUCOSE      POCT Blood Glucose.: 87 mg/dL (07 Sep 2023 16:51)  POCT Blood Glucose.: 274 mg/dL (07 Sep 2023 13:18)  POCT Blood Glucose.: 284 mg/dL (07 Sep 2023 12:06)  POCT Blood Glucose.: 334 mg/dL (07 Sep 2023 07:02)  POCT Blood Glucose.: 166 mg/dL (07 Sep 2023 00:44)          MEDICATIONS  albuterol/ipratropium for Nebulization 3 milliLiter(s) Nebulizer every 6 hours  atorvastatin 20 milliGRAM(s) Oral at bedtime  benzocaine/menthol Lozenge 1 Lozenge Oral every 2 hours PRN  budesonide 160 MICROgram(s)/formoterol 4.5 MICROgram(s) Inhaler 2 Puff(s) Inhalation two times a day  chlorhexidine 0.12% Liquid 5 milliLiter(s) Oral Mucosa two times a day  clopidogrel Tablet 75 milliGRAM(s) Oral daily  dextrose 5%. 1000 milliLiter(s) IV Continuous <Continuous>  dextrose 5%. 1000 milliLiter(s) IV Continuous <Continuous>  dextrose 50% Injectable 25 Gram(s) IV Push once  dextrose 50% Injectable 12.5 Gram(s) IV Push once  dextrose 50% Injectable 25 Gram(s) IV Push once  dextrose Oral Gel 15 Gram(s) Oral once PRN  diphenhydrAMINE 50 milliGRAM(s) Oral every 6 hours PRN  famotidine    Tablet 20 milliGRAM(s) Oral two times a day  glucagon  Injectable 1 milliGRAM(s) IntraMuscular once  guaiFENesin  milliGRAM(s) Oral every 12 hours  heparin   Injectable 5000 Unit(s) SubCutaneous every 8 hours  insulin glargine Injectable (LANTUS) 32 Unit(s) SubCutaneous at bedtime  insulin lispro (ADMELOG) corrective regimen sliding scale   SubCutaneous three times a day before meals  insulin lispro (ADMELOG) corrective regimen sliding scale   SubCutaneous at bedtime  insulin lispro (ADMELOG) corrective regimen sliding scale   SubCutaneous Before meals and at bedtime  insulin lispro Injectable (ADMELOG) 16 Unit(s) SubCutaneous before lunch  insulin lispro Injectable (ADMELOG) 15 Unit(s) SubCutaneous before breakfast  insulin lispro Injectable (ADMELOG) 18 Unit(s) SubCutaneous before dinner  methylPREDNISolone 16 milliGRAM(s) Oral daily  mexiletine 200 milliGRAM(s) Oral every 8 hours  ondansetron Injectable 4 milliGRAM(s) IV Push every 8 hours PRN  sodium chloride 0.9% lock flush 3 milliLiter(s) IV Push every 8 hours  tiotropium 2.5 MICROgram(s) Inhaler 2 Puff(s) Inhalation daily  vancomycin  IVPB 1000 milliGRAM(s) IV Intermittent once  vancomycin  IVPB 1000 milliGRAM(s) IV Intermittent every 12 hours  warfarin 5 milliGRAM(s) Oral once      >>> <<<  PHYSICAL EXAM       Gen: WN/WD NAD  Neuro: AAOx3, nonfocal  Pulm: CTA B/L remote healed  tracheostomy   CV: RRR, S1S2   Abd:  colostomy + Effluent stoma pink Soft, NT, ND   Ext: No edema, + peripheral pulses no hematoma to b/l groins  LABS      142  |  107  |  17  ----------------------------<  205<H>  5.0   |  22  |  0.58    Ca    8.5      07 Sep 2023 05:23  Phos  3.0       Mg     2.4         TPro  5.3<L>  /  Alb  3.5  /  TBili  0.3  /  DBili  x   /  AST  37  /  ALT  20  /  AlkPhos  61  -07                                 8.6    11.73 )-----------( 150      ( 07 Sep 2023 05:23 )             28.9          PT/INR - ( 07 Sep 2023 07:23 )   PT: 11.1 sec;   INR: 1.01 ratio         PTT - ( 07 Sep 2023 07:23 )  PTT:26.7 sec       PAST MEDICAL & SURGICAL HISTORY:  Atrial fibrillation  paroxysmal, on eliquis      Diabetes  Type 2      COPD (chronic obstructive pulmonary disease)      Adrenal insufficiency  Medrol daily for over 50 years      Aortic insufficiency  moderate AR on echo 5/3/2018      Pelvic fracture      Asthma      Tracheobronchomalacia  diagnosed , s/p bronchial thermoplasty  (Dr Zapien); recent bronchoscopy 2018 revealed no evidence of tracheobronchomalacia in trachea or bronchial tubes      Colorectal cancer  2018- last treatment , chemo and radiation      Rectal bleeding      Seizure  x 1 18      DVT (deep venous thrombosis)  15-20 years ago, took coumadin      TIA (transient ischemic attack)  multiple, last 5 years ago - presents as right-sided weakness      History of partial hysterectomy  30 years ago - fibroids      H/O total knee replacement, bilateral  5 years ago      History of sinus surgery  multiple sinus surgeries      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

## 2023-09-07 NOTE — PROGRESS NOTE ADULT - SUBJECTIVE AND OBJECTIVE BOX
Patient seen and examined at the bedside.    Remained critically ill on continuous ICU monitoring.    HPI:  71 yo PMHx  asthma on chronic steroids, bronchiectasis s/p surgery, allergic rhinitis,  recurrent PNA, PND, tracheomalacia s/p tracheoplasty, ESBL proteus infections (sputum),  diabetes, paroxysmal A-fib on Eliquis, colorectal cancer many years ago status post colostomy presenting with concern for shortness of breath. It started 2 and half weeks ago with dysnea on exertion, increased sputum production and running nose. She also endorsed sweatiness, chill, abd pain, denied fever, n/v/c/d, sick contact. she is up to date with vaccines. she has had chronic shortness of breath and cough for years. SHe had a recent hospitalization at an outside hospital  for acute respiratory failure with hypoxia secondary to asthma/COPD exacerbation and bronchopneumonia 6/5/2023 - 6/16/2023), She was found to have ESBL/Proteus/yeast in sputum culture and was treated with ertapenem/Benadryl/vancomycin/aztreonam and methylprednisolone.   She was discharged home with a midline and completed a course of antibiotics ertapenem (last dose 06/25).Dr. Allison also prescribed tobramycin after discharge,  and her metylprednisolone dose was reduced from 32 to 28 mg po daily from last monday to this monday. She states since dose reduction she has had worsened dyspnea. when she called Dr. Allison's office, he urged the patient to come to the ED for further management.     Of note had chronic dyspnea/severe asthma since childhood. She states she had a severe sinus infection requiring surgery at ?10 yo and since then has had difficulties with breathing. She has been on chronic steroids for over 20 years per report.. She uses Breo, Spiriva, albuterol neb and a cough assist device.     In the ED, BP stable 121/65, RR 22, saturating at RA at 100%, 97.5 temp. CBC has 10.56 HGB 10, platelet 227. chemistry normal except glucose 272. procal 0.13, trop 28. Last A1C 9.1 VBG 7.34, pCO2 is 52. CT showed new mucous secretions in the bronchus intermedius.Unchanged infectious/inflammatory small airways disease in the right middle/lower lobes with multifocal small tree-in-bud nodules.Status post ascending aortic and aortic valve replacement, with residual dissection flap better seen on the prior study.Stable indeterminate small left renal nodule and numerous pancreatic cysts. In the ED, she was on Duonebs, LR 500cc, ertapenem adn benadryl was given as well.  (11 Aug 2023 22:09)      =================== LABS =========================                        8.6    11.73 )-----------( 150      ( 07 Sep 2023 05:23 )             28.9     09-07    142  |  107  |  17  ----------------------------<  205<H>  5.0   |  22  |  0.58    Ca    8.5      07 Sep 2023 05:23  Phos  3.0     09-07  Mg     2.4     09-07    TPro  5.3<L>  /  Alb  3.5  /  TBili  0.3  /  DBili  x   /  AST  37  /  ALT  20  /  AlkPhos  61  09-07    LIVER FUNCTIONS - ( 07 Sep 2023 05:23 )  Alb: 3.5 g/dL / Pro: 5.3 g/dL / ALK PHOS: 61 U/L / ALT: 20 U/L / AST: 37 U/L / GGT: x           PT/INR - ( 06 Sep 2023 18:40 )   PT: 11.1 sec;   INR: 1.06 ratio         PTT - ( 06 Sep 2023 18:40 )  PTT:27.6 sec  ABG - ( 06 Sep 2023 16:52 )  pH, Arterial: 7.33  pH, Blood: x     /  pCO2: 49    /  pO2: 121   / HCO3: 26    / Base Excess: -0.3  /  SaO2: 98.8              Urinalysis Basic - ( 07 Sep 2023 05:23 )    Color: x / Appearance: x / SG: x / pH: x  Gluc: 205 mg/dL / Ketone: x  / Bili: x / Urobili: x   Blood: x / Protein: x / Nitrite: x   Leuk Esterase: x / RBC: x / WBC x   Sq Epi: x / Non Sq Epi: x / Bacteria: x        ==================================================    OBJECTIVE:  Vital Signs Last 24 Hrs  T(C): 36.1 (07 Sep 2023 04:00), Max: 36.4 (06 Sep 2023 14:32)  T(F): 97 (07 Sep 2023 04:00), Max: 97.5 (06 Sep 2023 14:32)  HR: 82 (07 Sep 2023 05:46) (60 - 84)  BP: 121/59 (07 Sep 2023 05:00) (109/55 - 137/63)  BP(mean): 85 (07 Sep 2023 05:00) (78 - 92)  RR: 18 (07 Sep 2023 05:00) (14 - 24)  SpO2: 100% (07 Sep 2023 05:46) (97% - 100%)    Parameters below as of 07 Sep 2023 05:46  Patient On (Oxygen Delivery Method): nasal cannula        REVIEW OF SYSTEMS:  Gen: No fever  EYES/ENT: No visual changes;  No vertigo or throat pain   NECK: No pain   RES:  No shortness of breath or Cough  CARD: No chest pain   GI: No abdominal pain  : No dysuria  NEURO: No weakness  SKIN: No itching, rashes     Physical Exam:  General: NAD   Neurology: nonfocal   Eyes: bilateral pupils equal and reactive   ENT/Neck: Neck supple, trachea midline, No JVD   Respiratory: Clear bilaterally   CV: S1S2, no murmurs        [x] Sinus rhythm [x] Temporary pacing - DDD 40  Abdominal: Soft, NT, ND +BS   Extremities: 1-2+ pedal edema noted, + peripheral pulses                          Assessment:  71 yo PMHx  asthma on chronic steroids, bronchiectasis s/p surgery, allergic rhinitis,  recurrent PNA, PND, tracheomalacia s/p tracheoplasty, ESBL proteus infections (sputum),  diabetes, paroxysmal A-fib on Eliquis, colorectal cancer many years ago status post colostomy.    9/6 - Valve in valve TAVR   Hypovolemia  Post op respiratory insufficiency  Acute blood loss anemia      Plan:   ***Neuro***  [x] Nonfocal  PRN Diphenydramine for rash/itching     ***Cardiovascular***  Invasive hemodynamic monitoring, assess perfusion indices   SR / Hct 28.9%  / Lactate 1.4  Continuous reassessment of hemodynamics   Continue on Mexiletine  [x] VTE ppx with SQ Heparin  [x] Plavix [x] Statin     ***Pulmonary***  [x] NC - 1L  Encourage incentive spirometry, continue pulse ox monitoring, follow ABGs             ***GI***  [x] NPO  Zofran for nausea    ***Renal***  Continue to monitor I/Os, BUN/Creatinine.   Replete lytes PRN    ***ID***  Vancomycin for preoperative prophylaxis    ***Endocrine***  [x] DM : f/u HbA1c                - [x] ISS             - Need tight glycemic control to prevent wound infection.      Patient requires continuous monitoring with bedside rhythm monitoring, pulse oximetry monitoring, and continuous central venous and arterial pressure monitoring; and intermittent blood gas analysis. Care plan discussed with the ICU care team.   Patient remained critical, at risk for life threatening decompensation.    I have spent 30 minutes providing critical care management to this patient.    By signing my name below, I, Kieran Plascencia, attest that this documentation has been prepared under the direction and in the presence of Alysa Tejada NP   Electronically signed: Rogers Cordero, 09-07-23 @ 07:18    I, Alysa Tejada, personally performed the services described in this documentation. all medical record entries made by the rogers were at my direction and in my presence. I have reviewed the chart and agree that the record reflects my personal performance and is accurate and complete  Electronically signed: Alysa Tejada NP  Patient seen and examined at the bedside.    Remained critically ill on continuous ICU monitoring.    HPI:  73 yo PMHx  asthma on chronic steroids, bronchiectasis s/p surgery, allergic rhinitis,  recurrent PNA, PND, tracheomalacia s/p tracheoplasty, ESBL proteus infections (sputum),  diabetes, paroxysmal A-fib on Eliquis, colorectal cancer many years ago status post colostomy presenting with concern for shortness of breath. It started 2 and half weeks ago with dysnea on exertion, increased sputum production and running nose. She also endorsed sweatiness, chill, abd pain, denied fever, n/v/c/d, sick contact. she is up to date with vaccines. she has had chronic shortness of breath and cough for years. SHe had a recent hospitalization at an outside hospital  for acute respiratory failure with hypoxia secondary to asthma/COPD exacerbation and bronchopneumonia 6/5/2023 - 6/16/2023), She was found to have ESBL/Proteus/yeast in sputum culture and was treated with ertapenem/Benadryl/vancomycin/aztreonam and methylprednisolone.   She was discharged home with a midline and completed a course of antibiotics ertapenem (last dose 06/25).Dr. Allison also prescribed tobramycin after discharge,  and her metylprednisolone dose was reduced from 32 to 28 mg po daily from last monday to this monday. She states since dose reduction she has had worsened dyspnea. when she called Dr. Allison's office, he urged the patient to come to the ED for further management.     Of note had chronic dyspnea/severe asthma since childhood. She states she had a severe sinus infection requiring surgery at ?12 yo and since then has had difficulties with breathing. She has been on chronic steroids for over 20 years per report.. She uses Breo, Spiriva, albuterol neb and a cough assist device.     In the ED, BP stable 121/65, RR 22, saturating at RA at 100%, 97.5 temp. CBC has 10.56 HGB 10, platelet 227. chemistry normal except glucose 272. procal 0.13, trop 28. Last A1C 9.1 VBG 7.34, pCO2 is 52. CT showed new mucous secretions in the bronchus intermedius.Unchanged infectious/inflammatory small airways disease in the right middle/lower lobes with multifocal small tree-in-bud nodules.Status post ascending aortic and aortic valve replacement, with residual dissection flap better seen on the prior study.Stable indeterminate small left renal nodule and numerous pancreatic cysts. In the ED, she was on Duonebs, LR 500cc, ertapenem adn benadryl was given as well.  (11 Aug 2023 22:09)      =================== LABS =========================                        8.6    11.73 )-----------( 150      ( 07 Sep 2023 05:23 )             28.9     09-07    142  |  107  |  17  ----------------------------<  205<H>  5.0   |  22  |  0.58    Ca    8.5      07 Sep 2023 05:23  Phos  3.0     09-07  Mg     2.4     09-07    TPro  5.3<L>  /  Alb  3.5  /  TBili  0.3  /  DBili  x   /  AST  37  /  ALT  20  /  AlkPhos  61  09-07    LIVER FUNCTIONS - ( 07 Sep 2023 05:23 )  Alb: 3.5 g/dL / Pro: 5.3 g/dL / ALK PHOS: 61 U/L / ALT: 20 U/L / AST: 37 U/L / GGT: x           PT/INR - ( 06 Sep 2023 18:40 )   PT: 11.1 sec;   INR: 1.06 ratio         PTT - ( 06 Sep 2023 18:40 )  PTT:27.6 sec  ABG - ( 06 Sep 2023 16:52 )  pH, Arterial: 7.33  pH, Blood: x     /  pCO2: 49    /  pO2: 121   / HCO3: 26    / Base Excess: -0.3  /  SaO2: 98.8              Urinalysis Basic - ( 07 Sep 2023 05:23 )    Color: x / Appearance: x / SG: x / pH: x  Gluc: 205 mg/dL / Ketone: x  / Bili: x / Urobili: x   Blood: x / Protein: x / Nitrite: x   Leuk Esterase: x / RBC: x / WBC x   Sq Epi: x / Non Sq Epi: x / Bacteria: x        ==================================================    OBJECTIVE:  Vital Signs Last 24 Hrs  T(C): 36.1 (07 Sep 2023 04:00), Max: 36.4 (06 Sep 2023 14:32)  T(F): 97 (07 Sep 2023 04:00), Max: 97.5 (06 Sep 2023 14:32)  HR: 82 (07 Sep 2023 05:46) (60 - 84)  BP: 121/59 (07 Sep 2023 05:00) (109/55 - 137/63)  BP(mean): 85 (07 Sep 2023 05:00) (78 - 92)  RR: 18 (07 Sep 2023 05:00) (14 - 24)  SpO2: 100% (07 Sep 2023 05:46) (97% - 100%)    Parameters below as of 07 Sep 2023 05:46  Patient On (Oxygen Delivery Method): nasal cannula        REVIEW OF SYSTEMS:  Gen: No fever  EYES/ENT: No visual changes;  No vertigo or throat pain   NECK: No pain   RES:  No shortness of breath or Cough  CARD: No chest pain   GI: No abdominal pain  : No dysuria  NEURO: No weakness  SKIN: No itching, rashes     Physical Exam:  General: NAD   Neurology: nonfocal   Eyes: bilateral pupils equal and reactive   ENT/Neck: Neck supple, trachea midline, No JVD   Respiratory: Clear bilaterally   CV: S1S2, no murmurs        [x] Sinus rhythm [x] Temporary pacing - DDD 40  Abdominal: Soft, NT, ND +BS   Extremities: 1-2+ pedal edema noted, + peripheral pulses                          Assessment:  73 yo PMHx  asthma on chronic steroids, bronchiectasis s/p surgery, allergic rhinitis,  recurrent PNA, PND, tracheomalacia s/p tracheoplasty, ESBL proteus infections (sputum),  diabetes, paroxysmal A-fib on Eliquis, colorectal cancer many years ago status post colostomy.    9/6 - Valve in valve TAVR   Hypovolemia  Post op respiratory insufficiency  Acute blood loss anemia      Plan:   ***Neuro***  [x] Nonfocal  PRN Diphenydramine for rash/itching     ***Cardiovascular***  Invasive hemodynamic monitoring, assess perfusion indices   SR / Hct 28.9%  / Lactate 1.4  Continuous reassessment of hemodynamics   Continue on Mexiletine  [x] VTE ppx with SQ Heparin  [x] Plavix [x] Statin   Allergic to ASA  ***Pulmonary***  [x] NC - 1L  Encourage incentive spirometry, continue pulse ox monitoring, follow ABGs             ***GI***  Tollerating Carb Consistent Diet  Zofran for nausea    ***Renal***  Continue to monitor I/Os, BUN/Creatinine.   Replete lytes PRN    ***ID***  Vancomycin for preoperative prophylaxis    ***Endocrine***  [x] DM : f/u HbA1c                - [x] ISS             - Need tight glycemic control to prevent wound infection.  Coumadin 5mg tonight     Patient requires continuous monitoring with bedside rhythm monitoring, pulse oximetry monitoring, and continuous central venous and arterial pressure monitoring; and intermittent blood gas analysis. Care plan discussed with the ICU care team.   Patient remained critical, at risk for life threatening decompensation.    I have spent 30 minutes providing critical care management to this patient.    By signing my name below, I, Kieran Plascencia, attest that this documentation has been prepared under the direction and in the presence of Alysa Tejada NP   Electronically signed: Rogers Cordero, 09-07-23 @ 07:18    I, Alysa Tejada, personally performed the services described in this documentation. all medical record entries made by the rogers were at my direction and in my presence. I have reviewed the chart and agree that the record reflects my personal performance and is accurate and complete  Electronically signed: Alysa Tejada NP

## 2023-09-08 LAB
A1C WITH ESTIMATED AVERAGE GLUCOSE RESULT: 5.7 % — HIGH (ref 4–5.6)
ALBUMIN SERPL ELPH-MCNC: 3.4 G/DL — SIGNIFICANT CHANGE UP (ref 3.3–5)
ALP SERPL-CCNC: 61 U/L — SIGNIFICANT CHANGE UP (ref 40–120)
ALT FLD-CCNC: 17 U/L — SIGNIFICANT CHANGE UP (ref 10–45)
ANION GAP SERPL CALC-SCNC: 10 MMOL/L — SIGNIFICANT CHANGE UP (ref 5–17)
APTT BLD: 29.3 SEC — SIGNIFICANT CHANGE UP (ref 24.5–35.6)
AST SERPL-CCNC: 16 U/L — SIGNIFICANT CHANGE UP (ref 10–40)
BILIRUB SERPL-MCNC: 0.2 MG/DL — SIGNIFICANT CHANGE UP (ref 0.2–1.2)
BUN SERPL-MCNC: 20 MG/DL — SIGNIFICANT CHANGE UP (ref 7–23)
CALCIUM SERPL-MCNC: 8.7 MG/DL — SIGNIFICANT CHANGE UP (ref 8.4–10.5)
CHLORIDE SERPL-SCNC: 106 MMOL/L — SIGNIFICANT CHANGE UP (ref 96–108)
CO2 SERPL-SCNC: 26 MMOL/L — SIGNIFICANT CHANGE UP (ref 22–31)
CREAT SERPL-MCNC: 0.63 MG/DL — SIGNIFICANT CHANGE UP (ref 0.5–1.3)
EGFR: 93 ML/MIN/1.73M2 — SIGNIFICANT CHANGE UP
ESTIMATED AVERAGE GLUCOSE: 117 MG/DL — HIGH (ref 68–114)
GLUCOSE BLDC GLUCOMTR-MCNC: 146 MG/DL — HIGH (ref 70–99)
GLUCOSE BLDC GLUCOMTR-MCNC: 230 MG/DL — HIGH (ref 70–99)
GLUCOSE BLDC GLUCOMTR-MCNC: 243 MG/DL — HIGH (ref 70–99)
GLUCOSE BLDC GLUCOMTR-MCNC: 288 MG/DL — HIGH (ref 70–99)
GLUCOSE BLDC GLUCOMTR-MCNC: 331 MG/DL — HIGH (ref 70–99)
GLUCOSE SERPL-MCNC: 231 MG/DL — HIGH (ref 70–99)
HCT VFR BLD CALC: 30.3 % — LOW (ref 34.5–45)
HGB BLD-MCNC: 8.5 G/DL — LOW (ref 11.5–15.5)
INR BLD: 1.34 RATIO — HIGH (ref 0.85–1.18)
MAGNESIUM SERPL-MCNC: 2.1 MG/DL — SIGNIFICANT CHANGE UP (ref 1.6–2.6)
MCHC RBC-ENTMCNC: 23.2 PG — LOW (ref 27–34)
MCHC RBC-ENTMCNC: 28.1 GM/DL — LOW (ref 32–36)
MCV RBC AUTO: 82.6 FL — SIGNIFICANT CHANGE UP (ref 80–100)
NRBC # BLD: 0 /100 WBCS — SIGNIFICANT CHANGE UP (ref 0–0)
PHOSPHATE SERPL-MCNC: 2.2 MG/DL — LOW (ref 2.5–4.5)
PLATELET # BLD AUTO: 245 K/UL — SIGNIFICANT CHANGE UP (ref 150–400)
POTASSIUM SERPL-MCNC: 4.4 MMOL/L — SIGNIFICANT CHANGE UP (ref 3.5–5.3)
POTASSIUM SERPL-SCNC: 4.4 MMOL/L — SIGNIFICANT CHANGE UP (ref 3.5–5.3)
PROT SERPL-MCNC: 5.2 G/DL — LOW (ref 6–8.3)
PROTHROM AB SERPL-ACNC: 14 SEC — HIGH (ref 9.5–13)
RBC # BLD: 3.67 M/UL — LOW (ref 3.8–5.2)
RBC # FLD: 33.9 % — HIGH (ref 10.3–14.5)
SODIUM SERPL-SCNC: 142 MMOL/L — SIGNIFICANT CHANGE UP (ref 135–145)
WBC # BLD: 8.68 K/UL — SIGNIFICANT CHANGE UP (ref 3.8–10.5)
WBC # FLD AUTO: 8.68 K/UL — SIGNIFICANT CHANGE UP (ref 3.8–10.5)

## 2023-09-08 PROCEDURE — 71045 X-RAY EXAM CHEST 1 VIEW: CPT | Mod: 26

## 2023-09-08 PROCEDURE — 99231 SBSQ HOSP IP/OBS SF/LOW 25: CPT | Mod: FS

## 2023-09-08 PROCEDURE — 99232 SBSQ HOSP IP/OBS MODERATE 35: CPT

## 2023-09-08 RX ORDER — INSULIN LISPRO 100/ML
18 VIAL (ML) SUBCUTANEOUS
Refills: 0 | Status: DISCONTINUED | OUTPATIENT
Start: 2023-09-09 | End: 2023-09-10

## 2023-09-08 RX ORDER — POLYETHYLENE GLYCOL 3350 17 G/17G
17 POWDER, FOR SOLUTION ORAL DAILY
Refills: 0 | Status: DISCONTINUED | OUTPATIENT
Start: 2023-09-08 | End: 2023-09-10

## 2023-09-08 RX ORDER — WARFARIN SODIUM 2.5 MG/1
5 TABLET ORAL ONCE
Refills: 0 | Status: COMPLETED | OUTPATIENT
Start: 2023-09-08 | End: 2023-09-08

## 2023-09-08 RX ORDER — INSULIN GLARGINE 100 [IU]/ML
35 INJECTION, SOLUTION SUBCUTANEOUS AT BEDTIME
Refills: 0 | Status: DISCONTINUED | OUTPATIENT
Start: 2023-09-08 | End: 2023-09-10

## 2023-09-08 RX ORDER — INSULIN LISPRO 100/ML
5 VIAL (ML) SUBCUTANEOUS AT BEDTIME
Refills: 0 | Status: DISCONTINUED | OUTPATIENT
Start: 2023-09-08 | End: 2023-09-10

## 2023-09-08 RX ORDER — INSULIN LISPRO 100/ML
VIAL (ML) SUBCUTANEOUS
Refills: 0 | Status: DISCONTINUED | OUTPATIENT
Start: 2023-09-08 | End: 2023-09-10

## 2023-09-08 RX ADMIN — Medication 16 UNIT(S): at 11:57

## 2023-09-08 RX ADMIN — Medication 18 UNIT(S): at 17:30

## 2023-09-08 RX ADMIN — MEXILETINE HYDROCHLORIDE 200 MILLIGRAM(S): 150 CAPSULE ORAL at 21:42

## 2023-09-08 RX ADMIN — SODIUM CHLORIDE 3 MILLILITER(S): 9 INJECTION INTRAMUSCULAR; INTRAVENOUS; SUBCUTANEOUS at 05:44

## 2023-09-08 RX ADMIN — Medication 16 MILLIGRAM(S): at 05:42

## 2023-09-08 RX ADMIN — TIOTROPIUM BROMIDE 2 PUFF(S): 18 CAPSULE ORAL; RESPIRATORY (INHALATION) at 12:43

## 2023-09-08 RX ADMIN — SODIUM CHLORIDE 3 MILLILITER(S): 9 INJECTION INTRAMUSCULAR; INTRAVENOUS; SUBCUTANEOUS at 22:22

## 2023-09-08 RX ADMIN — Medication 8: at 17:31

## 2023-09-08 RX ADMIN — Medication 600 MILLIGRAM(S): at 18:35

## 2023-09-08 RX ADMIN — Medication 3 MILLILITER(S): at 18:01

## 2023-09-08 RX ADMIN — HEPARIN SODIUM 5000 UNIT(S): 5000 INJECTION INTRAVENOUS; SUBCUTANEOUS at 13:11

## 2023-09-08 RX ADMIN — INSULIN GLARGINE 35 UNIT(S): 100 INJECTION, SOLUTION SUBCUTANEOUS at 21:43

## 2023-09-08 RX ADMIN — BUDESONIDE AND FORMOTEROL FUMARATE DIHYDRATE 2 PUFF(S): 160; 4.5 AEROSOL RESPIRATORY (INHALATION) at 05:42

## 2023-09-08 RX ADMIN — Medication 3 MILLILITER(S): at 23:29

## 2023-09-08 RX ADMIN — Medication 600 MILLIGRAM(S): at 05:42

## 2023-09-08 RX ADMIN — Medication 3 MILLILITER(S): at 05:42

## 2023-09-08 RX ADMIN — Medication 3 MILLILITER(S): at 12:43

## 2023-09-08 RX ADMIN — HEPARIN SODIUM 5000 UNIT(S): 5000 INJECTION INTRAVENOUS; SUBCUTANEOUS at 05:41

## 2023-09-08 RX ADMIN — Medication 15 UNIT(S): at 08:30

## 2023-09-08 RX ADMIN — Medication 4: at 11:58

## 2023-09-08 RX ADMIN — MEXILETINE HYDROCHLORIDE 200 MILLIGRAM(S): 150 CAPSULE ORAL at 13:12

## 2023-09-08 RX ADMIN — MEXILETINE HYDROCHLORIDE 200 MILLIGRAM(S): 150 CAPSULE ORAL at 06:14

## 2023-09-08 RX ADMIN — CLOPIDOGREL BISULFATE 75 MILLIGRAM(S): 75 TABLET, FILM COATED ORAL at 12:43

## 2023-09-08 RX ADMIN — FAMOTIDINE 20 MILLIGRAM(S): 10 INJECTION INTRAVENOUS at 18:35

## 2023-09-08 RX ADMIN — BUDESONIDE AND FORMOTEROL FUMARATE DIHYDRATE 2 PUFF(S): 160; 4.5 AEROSOL RESPIRATORY (INHALATION) at 18:35

## 2023-09-08 RX ADMIN — Medication 4: at 08:29

## 2023-09-08 RX ADMIN — POLYETHYLENE GLYCOL 3350 17 GRAM(S): 17 POWDER, FOR SOLUTION ORAL at 21:42

## 2023-09-08 RX ADMIN — SODIUM CHLORIDE 3 MILLILITER(S): 9 INJECTION INTRAMUSCULAR; INTRAVENOUS; SUBCUTANEOUS at 13:12

## 2023-09-08 RX ADMIN — FAMOTIDINE 20 MILLIGRAM(S): 10 INJECTION INTRAVENOUS at 05:41

## 2023-09-08 RX ADMIN — ATORVASTATIN CALCIUM 20 MILLIGRAM(S): 80 TABLET, FILM COATED ORAL at 21:42

## 2023-09-08 RX ADMIN — WARFARIN SODIUM 5 MILLIGRAM(S): 2.5 TABLET ORAL at 21:42

## 2023-09-08 RX ADMIN — HEPARIN SODIUM 5000 UNIT(S): 5000 INJECTION INTRAVENOUS; SUBCUTANEOUS at 21:41

## 2023-09-08 NOTE — PROGRESS NOTE ADULT - NSPROGADDITIONALINFOA_GEN_ALL_CORE
-Plan discussed with pt/team.  Contact info: [phone] (24/7). pager [phone]  Amion on [place]  Teams on M-T-W-F. Unavailable Thu/Weekends/Holidays  Assessed pt/labs/meds and discussed plan of care with primary team  Adjusting insulin  Discharge plan  Follow up care
-Plan discussed with pt/team.  Contact info: [phone] (24/7). pager [phone]  Amion on [place]  Teams on M-T-W-F. Unavailable Thu/Weekends/Holidays  Assessed pt/labs/meds and discussed plan of care with primary team  Adjusting insulin  Discharge plan  Follow up care
-Plan discussed with pt/team.  Contact info: [phone] (24/7). pager [phone]  Amion on [place]  Teams on M-T-W-F. Unavailable Thu/Weekends/Holidays  Assessed pt/labs/meds and discussed plan of care with primary team  Adjusting insulin  Discharge plan  Follow up care
-Plan discussed with pt/team.  Contact info: [phone] (24/7). pager [phone]  Amion on [place]  Teams on M-T-W-F. Unavailable Thu/Weekends/Holidays  Assessed pt/labs/meds and discussed plan of care with primary team  Adjusting insulin  Discharge plan  Follow up care
-Plan discussed with pt/team.  Contact info: [phone] (24/7). pager [phone]  Amion on [place]  Teams on M-T-W-F. Unavailable Thu/Weekends/Holidays  Assessed pt/labs/meds and discussed plan of care with primary team  Adjusting insulin  Discharge plan  Follow up care
-Plan discussed with pt/team.  Contact info: [phone] (24/7). pager [phone]  Amion on [place]  Teams on M-T-W-F. Unavailable Thu/Weekends/Holidays  Assessed pt/labs/meds and discussed plan of care with primary team  Adjusting insulin  Discharge plan  Follow up care
-Plan discussed with pt/team.  Contact info: [phone] (24/7). pager [phone]  Amion on [place]  Teams on M-T-W-F. Unavailable Thu/Weekends/Holidays  Assessed pt/labs/meds and discussed plan of care with primary team  Adjusting insulin  Discharge plan  Follow up care
-Plan discussed with pt/team.  Contact info: [phone] (24/7). pager [phone]  Amion on [place]  Teams on M-T-W-F. Unavailable Thu/Weekends/Holidays  Assessed pt/labs/meds and discussed plan of care with primary team  Adjusting insulin  Discharge plan  Follow up care
-Plan discussed with pt/team.  Contact info: [phone] (24/7). pager [phone]  Amion on [place]  Teams on M-T-W-F. Unavailable Thu/Weekends/Holidays  Assessed pt/labs/meds and discussed plan of care with primary team  Adjusting insulin  Discharge plan  Follow up care
# Bronchiectasis  - on steroid taper, symbicort, duoneb, spiriva
# Bronchiectasis  - on steroid taper, symbicort, duoneb, spiriva

## 2023-09-08 NOTE — PROGRESS NOTE ADULT - ASSESSMENT
72 F w/h/o uncontrolled T2DM (A1C 9.4%) on basal/bolus insulin PTA. Unknown DM complications. Also COPD, secondary adrenal Insufficiency on chronic steroids, colorectal cancer s/p resection (colostomy bag), Chronic A fib on Eliquis, and tracheomalacia and multiple intubations, type A aortic dissection s/p aortic dissection repair on 9/07/22, sepsis. Pt well known to this provider from prior admissions. Here with SOB> treated for PNA and on Prednisolone  16mg/day for AI.  Also found to have anemia and severe aortic stenosis> hematology and ct surgery following pt.  Endocrine consulted for assistance with uncontrolled DM. S/p CTSx 9/6 with BG above goal , noted patient has not received lantus in over 24 hours   9/7 TAVR #23 magaly, no conduction abnormality timi procedure.  Freestyle Luis E 3 if pt going home. Needs keshia on phone but pt states she wants daughter to do it.    9/8 VSS awaiting INR - echo this am- will d/c home Sunday if INR up.

## 2023-09-08 NOTE — PROGRESS NOTE ADULT - PROBLEM SELECTOR PLAN 1
Test BG ac and hs and 2am if eating. Q6H while NPO  -Increase Lantus dose to 35 units sq hs   -Adjust Admelog doses to 18-16-18 units qac  Hold if NPO or eating less than 50% of meals.  -Add Admelog 5 units at hs with snack. HOLD IF NOT EATING AT NIGHT  -C/w moderate correction scale ac meals and moderate correction hs and 2am.    Please contact endo team with any changes on steroid doses!!   Discharge:  Will be determined according to BG/insulin needs/PO intake and steroid therapy at time of discharge.  Plan to d/c on basal bolus. Doses TBD  Outpt. endo follow-up. Dr Saavedra  Outpt. optho, podiatry, micro/cr  Make sure pt has Rx for all DM supplies and insulin/ DM meds. Consider [facility] Luis E 3 if going home

## 2023-09-08 NOTE — PROGRESS NOTE ADULT - SUBJECTIVE AND OBJECTIVE BOX
DIABETES FOLLOW UP NOTE: Saw pt earlier today    Chief Complaint: Endocrine consult requested for management of T2DM    INTERVAL HX: Pt stable, reports tolerating POs with BG 200s to 300s today. Asking for bedtime egg sandwich> states she eats this snack every day with a yogurt. Noted BG at 2am > 200s most of the time likely due to snack. No hypoglycemia. Feels well, no CP and only slight SOB at exertion but greatly improved after surgery. Remains on Methylprednisolone 16mg q am.         Review of Systems:  General: As above  Cardiovascular: No chest pain, palpitations  Respiratory: mild SOB at exertion, no cough  GI: No nausea, vomiting, abdominal pain  Endocrine: No polyuria, polydipsia or S&Sx of hypoglycemia    Allergies    tetanus toxoid (Short breath)  cefepime (Anaphylaxis)  penicillin (Anaphylaxis)  Avelox (Short breath; Pruritus)  Dilaudid (Short breath)  codeine (Short breath)  aspirin (Short breath)  iodine (Short breath; Swelling)  Valium (Short breath)  shellfish (Anaphylaxis)    Intolerances      MEDICATIONS:  atorvastatin 20 milliGRAM(s) Oral at bedtime  insulin glargine Injectable (LANTUS) 32 Unit(s) SubCutaneous at bedtime  insulin lispro (ADMELOG) corrective regimen sliding scale   SubCutaneous three times a day before meals  insulin lispro (ADMELOG) corrective regimen sliding scale   SubCutaneous at bedtime  insulin lispro Injectable (ADMELOG) 15 Unit(s) SubCutaneous before breakfast  insulin lispro Injectable (ADMELOG) 16 Unit(s) SubCutaneous before lunch  insulin lispro Injectable (ADMELOG) 18 Unit(s) SubCutaneous before dinner  methylPREDNISolone 16 milliGRAM(s) Oral daily  vancomycin  IVPB 1000 milliGRAM(s) IV Intermittent once        PHYSICAL EXAM:  VITALS: T(C): 36.6 (09-08-23 @ 11:59)  T(F): 97.8 (09-08-23 @ 11:59), Max: 98.2 (09-07-23 @ 19:56)  HR: 78 (09-08-23 @ 11:59) (72 - 82)  BP: 102/58 (09-08-23 @ 11:59) (97/60 - 111/67)  RR:  (18 - 18)  SpO2:  (100% - 100%)  Wt(kg): --  GENERAL: Female laying in bed in NAD  Abdomen: Soft, nontender, non distended, R inguinal dressing D&I. + active colostomy (chronic)  Extremities: Warm, no edema in all 4 exts  NEURO: Alert and able to answer questions      LABS:  POCT Blood Glucose.: 331 mg/dL (09-08-23 @ 16:35)  POCT Blood Glucose.: 230 mg/dL (09-08-23 @ 11:40)  POCT Blood Glucose.: 243 mg/dL (09-08-23 @ 08:11)  POCT Blood Glucose.: 288 mg/dL (09-08-23 @ 02:36)  POCT Blood Glucose.: 263 mg/dL (09-07-23 @ 21:48)  POCT Blood Glucose.: 180 mg/dL (09-07-23 @ 18:51)  POCT Blood Glucose.: 87 mg/dL (09-07-23 @ 16:51)  POCT Blood Glucose.: 274 mg/dL (09-07-23 @ 13:18)  POCT Blood Glucose.: 284 mg/dL (09-07-23 @ 12:06)  POCT Blood Glucose.: 334 mg/dL (09-07-23 @ 07:02)  POCT Blood Glucose.: 166 mg/dL (09-07-23 @ 00:44)  POCT Blood Glucose.: 281 mg/dL (09-06-23 @ 12:34)  POCT Blood Glucose.: 338 mg/dL (09-06-23 @ 08:55)  POCT Blood Glucose.: 250 mg/dL (09-06-23 @ 02:14)  POCT Blood Glucose.: 117 mg/dL (09-05-23 @ 22:02)                            8.5    8.68  )-----------( 245      ( 08 Sep 2023 06:39 )             30.3       09-08    142  |  106  |  20  ----------------------------<  231<H>  4.4   |  26  |  0.63    eGFR: 93    Ca    8.7      09-08  Mg     2.1     09-08  Phos  2.2     09-08    TPro  5.2<L>  /  Alb  3.4  /  TBili  0.2  /  DBili  x   /  AST  16  /  ALT  17  /  AlkPhos  61  09-08    Thyroid Function Tests:  09-06 @ 09:32 TSH 0.49 FreeT4 1.2 T3 82 Anti TPO -- Anti Thyroglobulin Ab -- TSI --      A1C with Estimated Average Glucose Result: 5.7 % (09-08-23 @ 06:39)  A1C with Estimated Average Glucose Result: 9.1 % (06-17-23 @ 10:27)      Estimated Average Glucose: 117 mg/dL (09-08-23 @ 06:39)  Estimated Average Glucose: 214 mg/dL (06-17-23 @ 10:27)

## 2023-09-08 NOTE — PROGRESS NOTE ADULT - SUBJECTIVE AND OBJECTIVE BOX
VITAL SIGNS-Telemetry:  SR 70-90  Vital Signs Last 24 Hrs  T(C): 36.6 (09-08-23 @ 05:40), Max: 36.8 (09-07-23 @ 11:00)  T(F): 97.9 (09-08-23 @ 05:40), Max: 98.2 (09-07-23 @ 11:00)  HR: 72 (09-08-23 @ 05:40) (72 - 82)  BP: 97/60 (09-08-23 @ 05:40) (97/60 - 138/64)  RR: 18 (09-08-23 @ 05:40) (18 - 20)  SpO2: 100% (09-08-23 @ 05:40) (97% - 100%)         09-07 @ 07:01  -  09-08 @ 07:00  --------------------------------------------------------  IN: 480 mL / OUT: 1100 mL / NET: -620 mL    Daily     Daily     CAPILLARY BLOOD GLUCOSE  POCT Blood Glucose.: 243 mg/dL (08 Sep 2023 08:11)  POCT Blood Glucose.: 288 mg/dL (08 Sep 2023 02:36)  POCT Blood Glucose.: 263 mg/dL (07 Sep 2023 21:48)  POCT Blood Glucose.: 180 mg/dL (07 Sep 2023 18:51)       Coumadin    [x ] YES          [  ]      NO         Reason:     paf  PHYSICAL EXAM:  Neurology: alert and oriented x 3, nonfocal, no gross deficits  CV : S1S2  Lungs: cta  Abdomen: soft, nontender, nondistended, positive bowel sounds, last bowel movement  9/7       Extremities:     no edema, no calf tenderness  b/l groin sites cdi - scant drainage    albuterol/ipratropium for Nebulization 3 milliLiter(s) Nebulizer every 6 hours  atorvastatin 20 milliGRAM(s) Oral at bedtime  benzocaine/menthol Lozenge 1 Lozenge Oral every 2 hours PRN  budesonide 160 MICROgram(s)/formoterol 4.5 MICROgram(s) Inhaler 2 Puff(s) Inhalation two times a day  chlorhexidine 0.12% Liquid 5 milliLiter(s) Oral Mucosa two times a day  clopidogrel Tablet 75 milliGRAM(s) Oral daily  dextrose 5%. 1000 milliLiter(s) IV Continuous <Continuous>  dextrose 5%. 1000 milliLiter(s) IV Continuous <Continuous>  dextrose 50% Injectable 12.5 Gram(s) IV Push once  dextrose 50% Injectable 25 Gram(s) IV Push once  dextrose 50% Injectable 25 Gram(s) IV Push once  dextrose Oral Gel 15 Gram(s) Oral once PRN  diphenhydrAMINE 50 milliGRAM(s) Oral every 6 hours PRN  famotidine    Tablet 20 milliGRAM(s) Oral two times a day  glucagon  Injectable 1 milliGRAM(s) IntraMuscular once  guaiFENesin  milliGRAM(s) Oral every 12 hours  heparin   Injectable 5000 Unit(s) SubCutaneous every 8 hours  insulin glargine Injectable (LANTUS) 32 Unit(s) SubCutaneous at bedtime  insulin lispro (ADMELOG) corrective regimen sliding scale   SubCutaneous three times a day before meals  insulin lispro (ADMELOG) corrective regimen sliding scale   SubCutaneous at bedtime  insulin lispro (ADMELOG) corrective regimen sliding scale   SubCutaneous Before meals and at bedtime  insulin lispro Injectable (ADMELOG) 16 Unit(s) SubCutaneous before lunch  insulin lispro Injectable (ADMELOG) 15 Unit(s) SubCutaneous before breakfast  insulin lispro Injectable (ADMELOG) 18 Unit(s) SubCutaneous before dinner  methylPREDNISolone 16 milliGRAM(s) Oral daily  mexiletine 200 milliGRAM(s) Oral every 8 hours  ondansetron Injectable 4 milliGRAM(s) IV Push every 8 hours PRN  sodium chloride 0.9% lock flush 3 milliLiter(s) IV Push every 8 hours  tiotropium 2.5 MICROgram(s) Inhaler 2 Puff(s) Inhalation daily  vancomycin  IVPB 1000 milliGRAM(s) IV Intermittent once      Physical Therapy Rec:   Home  [  ]   Home w/ PT  [  ]  Rehab  [  ]  Discussed with Cardiothoracic Team at AM rounds.

## 2023-09-08 NOTE — PROGRESS NOTE ADULT - PROBLEM SELECTOR PLAN 4
On chronic steroids at home > medrol 8mg qd from admission in 2022  Per pt she has been sick this year with multiple hospitalizations requiring pulse steroids. Was on Methylprednisolone 28mg PTA.   -Pt on slow titration back to Medrol 8mg qd  -c/w MTP 16mg   - Will need stress steroids if acutely ill or for OR. No need for stress steroid doses if pt is given Prednisone for IV dye allergy.

## 2023-09-08 NOTE — PROGRESS NOTE ADULT - ASSESSMENT
72 F w/h/o uncontrolled T2DM (A1C 9.4%) on basal/bolus insulin PTA. Unknown DM complications. Also COPD, secondary adrenal Insufficiency on chronic steroids, colorectal cancer s/p resection (colostomy bag), Chronic A fib on Eliquis, and tracheomalacia and multiple intubations, type A aortic dissection s/p aortic dissection repair on 9/07/22, sepsis. Pt well known to this provider from prior admissions. Here with SOB> treated for PNA and on Prednisolone 16mg/day for AI.  Also found to have anemia and severe aortic stenosis> s/p Valve in valve TAVR 9/6/23.  Endocrine consulted for assistance with uncontrolled DM. Tolerating POs with BG above goal (200s to 300s) having snacks at night with BG >200s at night most of the time. Will adjust insulin doses to BG goal 100 to 180s. No hypoglycemia.     Will start Freestyle Luis E 3 if pt going home. Needs keshia on phone but pt states she wants daughter to do it.

## 2023-09-08 NOTE — PROGRESS NOTE ADULT - NUTRITIONAL ASSESSMENT
Diet, Consistent Carbohydrate w/Evening Snack (09-08-23 @ 17:00) [Active]    Please see RD assessment and/or follow up.  Managed by primary team as well

## 2023-09-09 LAB
ANION GAP SERPL CALC-SCNC: 14 MMOL/L — SIGNIFICANT CHANGE UP (ref 5–17)
APTT BLD: 31.2 SEC — SIGNIFICANT CHANGE UP (ref 24.5–35.6)
BUN SERPL-MCNC: 19 MG/DL — SIGNIFICANT CHANGE UP (ref 7–23)
CALCIUM SERPL-MCNC: 8.9 MG/DL — SIGNIFICANT CHANGE UP (ref 8.4–10.5)
CHLORIDE SERPL-SCNC: 105 MMOL/L — SIGNIFICANT CHANGE UP (ref 96–108)
CO2 SERPL-SCNC: 23 MMOL/L — SIGNIFICANT CHANGE UP (ref 22–31)
CREAT SERPL-MCNC: 0.62 MG/DL — SIGNIFICANT CHANGE UP (ref 0.5–1.3)
EGFR: 93 ML/MIN/1.73M2 — SIGNIFICANT CHANGE UP
GLUCOSE BLDC GLUCOMTR-MCNC: 108 MG/DL — HIGH (ref 70–99)
GLUCOSE BLDC GLUCOMTR-MCNC: 121 MG/DL — HIGH (ref 70–99)
GLUCOSE BLDC GLUCOMTR-MCNC: 165 MG/DL — HIGH (ref 70–99)
GLUCOSE BLDC GLUCOMTR-MCNC: 165 MG/DL — HIGH (ref 70–99)
GLUCOSE BLDC GLUCOMTR-MCNC: 80 MG/DL — SIGNIFICANT CHANGE UP (ref 70–99)
GLUCOSE SERPL-MCNC: 161 MG/DL — HIGH (ref 70–99)
HCT VFR BLD CALC: 31.2 % — LOW (ref 34.5–45)
HGB BLD-MCNC: 9 G/DL — LOW (ref 11.5–15.5)
INR BLD: 1.46 RATIO — HIGH (ref 0.85–1.18)
MCHC RBC-ENTMCNC: 23.6 PG — LOW (ref 27–34)
MCHC RBC-ENTMCNC: 28.8 GM/DL — LOW (ref 32–36)
MCV RBC AUTO: 81.7 FL — SIGNIFICANT CHANGE UP (ref 80–100)
NRBC # BLD: 0 /100 WBCS — SIGNIFICANT CHANGE UP (ref 0–0)
PLATELET # BLD AUTO: 229 K/UL — SIGNIFICANT CHANGE UP (ref 150–400)
POTASSIUM SERPL-MCNC: 4 MMOL/L — SIGNIFICANT CHANGE UP (ref 3.5–5.3)
POTASSIUM SERPL-SCNC: 4 MMOL/L — SIGNIFICANT CHANGE UP (ref 3.5–5.3)
PROTHROM AB SERPL-ACNC: 15.2 SEC — HIGH (ref 9.5–13)
RBC # BLD: 3.82 M/UL — SIGNIFICANT CHANGE UP (ref 3.8–5.2)
RBC # FLD: 32.5 % — HIGH (ref 10.3–14.5)
SODIUM SERPL-SCNC: 142 MMOL/L — SIGNIFICANT CHANGE UP (ref 135–145)
WBC # BLD: 9.84 K/UL — SIGNIFICANT CHANGE UP (ref 3.8–10.5)
WBC # FLD AUTO: 9.84 K/UL — SIGNIFICANT CHANGE UP (ref 3.8–10.5)

## 2023-09-09 PROCEDURE — 71045 X-RAY EXAM CHEST 1 VIEW: CPT | Mod: 26

## 2023-09-09 PROCEDURE — 93010 ELECTROCARDIOGRAM REPORT: CPT

## 2023-09-09 PROCEDURE — 99232 SBSQ HOSP IP/OBS MODERATE 35: CPT

## 2023-09-09 RX ORDER — SIMETHICONE 80 MG/1
80 TABLET, CHEWABLE ORAL EVERY 12 HOURS
Refills: 0 | Status: DISCONTINUED | OUTPATIENT
Start: 2023-09-09 | End: 2023-09-10

## 2023-09-09 RX ORDER — WARFARIN SODIUM 2.5 MG/1
5 TABLET ORAL ONCE
Refills: 0 | Status: COMPLETED | OUTPATIENT
Start: 2023-09-09 | End: 2023-09-09

## 2023-09-09 RX ORDER — SENNA PLUS 8.6 MG/1
2 TABLET ORAL AT BEDTIME
Refills: 0 | Status: DISCONTINUED | OUTPATIENT
Start: 2023-09-09 | End: 2023-09-10

## 2023-09-09 RX ADMIN — Medication 18 UNIT(S): at 08:44

## 2023-09-09 RX ADMIN — FAMOTIDINE 20 MILLIGRAM(S): 10 INJECTION INTRAVENOUS at 17:30

## 2023-09-09 RX ADMIN — TIOTROPIUM BROMIDE 2 PUFF(S): 18 CAPSULE ORAL; RESPIRATORY (INHALATION) at 08:45

## 2023-09-09 RX ADMIN — HEPARIN SODIUM 5000 UNIT(S): 5000 INJECTION INTRAVENOUS; SUBCUTANEOUS at 21:56

## 2023-09-09 RX ADMIN — HEPARIN SODIUM 5000 UNIT(S): 5000 INJECTION INTRAVENOUS; SUBCUTANEOUS at 13:54

## 2023-09-09 RX ADMIN — Medication 600 MILLIGRAM(S): at 17:30

## 2023-09-09 RX ADMIN — SODIUM CHLORIDE 3 MILLILITER(S): 9 INJECTION INTRAMUSCULAR; INTRAVENOUS; SUBCUTANEOUS at 21:50

## 2023-09-09 RX ADMIN — Medication 16 UNIT(S): at 13:54

## 2023-09-09 RX ADMIN — HEPARIN SODIUM 5000 UNIT(S): 5000 INJECTION INTRAVENOUS; SUBCUTANEOUS at 05:58

## 2023-09-09 RX ADMIN — SIMETHICONE 80 MILLIGRAM(S): 80 TABLET, CHEWABLE ORAL at 12:17

## 2023-09-09 RX ADMIN — MEXILETINE HYDROCHLORIDE 200 MILLIGRAM(S): 150 CAPSULE ORAL at 21:56

## 2023-09-09 RX ADMIN — SODIUM CHLORIDE 3 MILLILITER(S): 9 INJECTION INTRAMUSCULAR; INTRAVENOUS; SUBCUTANEOUS at 14:23

## 2023-09-09 RX ADMIN — Medication 30 MILLILITER(S): at 12:17

## 2023-09-09 RX ADMIN — INSULIN GLARGINE 35 UNIT(S): 100 INJECTION, SOLUTION SUBCUTANEOUS at 21:57

## 2023-09-09 RX ADMIN — ATORVASTATIN CALCIUM 20 MILLIGRAM(S): 80 TABLET, FILM COATED ORAL at 21:56

## 2023-09-09 RX ADMIN — BUDESONIDE AND FORMOTEROL FUMARATE DIHYDRATE 2 PUFF(S): 160; 4.5 AEROSOL RESPIRATORY (INHALATION) at 05:59

## 2023-09-09 RX ADMIN — WARFARIN SODIUM 5 MILLIGRAM(S): 2.5 TABLET ORAL at 21:56

## 2023-09-09 RX ADMIN — SENNA PLUS 2 TABLET(S): 8.6 TABLET ORAL at 21:57

## 2023-09-09 RX ADMIN — Medication 3 MILLILITER(S): at 05:59

## 2023-09-09 RX ADMIN — MEXILETINE HYDROCHLORIDE 200 MILLIGRAM(S): 150 CAPSULE ORAL at 13:55

## 2023-09-09 RX ADMIN — MEXILETINE HYDROCHLORIDE 200 MILLIGRAM(S): 150 CAPSULE ORAL at 05:58

## 2023-09-09 RX ADMIN — POLYETHYLENE GLYCOL 3350 17 GRAM(S): 17 POWDER, FOR SOLUTION ORAL at 08:44

## 2023-09-09 RX ADMIN — ONDANSETRON 4 MILLIGRAM(S): 8 TABLET, FILM COATED ORAL at 18:41

## 2023-09-09 RX ADMIN — Medication 2: at 08:43

## 2023-09-09 RX ADMIN — Medication 600 MILLIGRAM(S): at 05:58

## 2023-09-09 RX ADMIN — SODIUM CHLORIDE 3 MILLILITER(S): 9 INJECTION INTRAMUSCULAR; INTRAVENOUS; SUBCUTANEOUS at 06:13

## 2023-09-09 RX ADMIN — BUDESONIDE AND FORMOTEROL FUMARATE DIHYDRATE 2 PUFF(S): 160; 4.5 AEROSOL RESPIRATORY (INHALATION) at 17:30

## 2023-09-09 RX ADMIN — CLOPIDOGREL BISULFATE 75 MILLIGRAM(S): 75 TABLET, FILM COATED ORAL at 09:01

## 2023-09-09 RX ADMIN — FAMOTIDINE 20 MILLIGRAM(S): 10 INJECTION INTRAVENOUS at 05:58

## 2023-09-09 RX ADMIN — Medication 3 MILLILITER(S): at 12:18

## 2023-09-09 RX ADMIN — Medication 16 MILLIGRAM(S): at 05:58

## 2023-09-09 NOTE — PROGRESS NOTE ADULT - PROBLEM SELECTOR PROBLEM 5
Prophylactic measure
H/O adrenal insufficiency
Prophylactic measure
H/O adrenal insufficiency
Prophylactic measure
Diabetes mellitus
H/O adrenal insufficiency
Prophylactic measure

## 2023-09-09 NOTE — PROGRESS NOTE ADULT - SUBJECTIVE AND OBJECTIVE BOX
VITAL SIGNS    Subjective: "I'm feeling ok" Denies CP, palpitation, SOB, KRAMER, HA, dizziness, N/V/D, fever or chills.  No acute event noted overnight.     Telemetry: NSR 70-80      Vital Signs Last 24 Hrs  T(C): 36.6 (09-09-23 @ 04:39), Max: 36.6 (09-08-23 @ 11:59)  T(F): 97.9 (09-09-23 @ 04:39), Max: 97.9 (09-08-23 @ 19:38)  HR: 85 (09-09-23 @ 04:39) (74 - 85)  BP: 107/67 (09-09-23 @ 04:39) (102/58 - 108/67)  RR: 18 (09-09-23 @ 04:39) (18 - 18)  SpO2: 99% (09-09-23 @ 04:39) (99% - 100%)          09-08 @ 07:01  -  09-09 @ 07:00  --------------------------------------------------------  IN: 300 mL / OUT: 1150 mL / NET: -850 mL    09-09 @ 07:01  -  09-09 @ 11:36  --------------------------------------------------------  IN: 120 mL / OUT: 200 mL / NET: -80 mL    LABS  09-09    142  |  105  |  19  ----------------------------<  161<H>  4.0   |  23  |  0.62    Ca    8.9      09 Sep 2023 06:44  Phos  2.2     09-08  Mg     2.1     09-08    TPro  5.2<L>  /  Alb  3.4  /  TBili  0.2  /  DBili  x   /  AST  16  /  ALT  17  /  AlkPhos  61  09-08                                 9.0    9.84  )-----------( 229      ( 09 Sep 2023 06:46 )             31.2          PT/INR - ( 09 Sep 2023 06:46 )   PT: 15.2 sec;   INR: 1.46 ratio         PTT - ( 09 Sep 2023 06:46 )  PTT:31.2 sec    CAPILLARY BLOOD GLUCOSE    POCT Blood Glucose.: 165 mg/dL (09 Sep 2023 08:38)  POCT Blood Glucose.: 165 mg/dL (09 Sep 2023 02:05)  POCT Blood Glucose.: 146 mg/dL (08 Sep 2023 21:23)  POCT Blood Glucose.: 331 mg/dL (08 Sep 2023 16:35)  POCT Blood Glucose.: 230 mg/dL (08 Sep 2023 11:40)        PHYSICAL EXAM    Neurology: alert and oriented x 3, nonfocal, no gross deficits    CV: (+) S1 and S2, No murmurs, rubs, gallops or clicks     Lungs: B/L Scatter Rhonchi and expiratory wheeze     Abdomen: soft, nontender, nondistended, positive bowel sounds, (+) Flatus; LUQ Colostomy --> SB    :  Voiding               Extremities:  B/L LE (-) edema; negative calf tenderness; (+) 2 DP palpable        albuterol/ipratropium for Nebulization 3 milliLiter(s) Nebulizer every 6 hours  atorvastatin 20 milliGRAM(s) Oral at bedtime  benzocaine/menthol Lozenge 1 Lozenge Oral every 2 hours PRN  budesonide 160 MICROgram(s)/formoterol 4.5 MICROgram(s) Inhaler 2 Puff(s) Inhalation two times a day  clopidogrel Tablet 75 milliGRAM(s) Oral daily  famotidine Tablet 20 milliGRAM(s) Oral two times a day  glucagon  Injectable 1 milliGRAM(s) IntraMuscular once  guaiFENesin  milliGRAM(s) Oral every 12 hours  heparin Injectable 5000 Unit(s) SubCutaneous every 8 hours  insulin glargine Injectable (LANTUS) 35 Unit(s) SubCutaneous at bedtime  insulin lispro (ADMELOG) corrective regimen sliding scale   SubCutaneous three times a day before meals  insulin lispro (ADMELOG) corrective regimen sliding scale   SubCutaneous <User Schedule>  insulin lispro Injectable (ADMELOG) 18 Unit(s) SubCutaneous before breakfast  insulin lispro Injectable (ADMELOG) 16 Unit(s) SubCutaneous before lunch  insulin lispro Injectable (ADMELOG) 18 Unit(s) SubCutaneous before dinner  insulin lispro Injectable (ADMELOG) 5 Unit(s) SubCutaneous at bedtime  methylPREDNISolone 16 milliGRAM(s) Oral daily  mexiletine 200 milliGRAM(s) Oral every 8 hours  ondansetron Injectable 4 milliGRAM(s) IV Push every 8 hours PRN  polyethylene glycol 3350 17 Gram(s) Oral daily  sodium chloride 0.9% lock flush 3 milliLiter(s) IV Push every 8 hours  tiotropium 2.5 MICROgram(s) Inhaler 2 Puff(s) Inhalation daily    Physical Therapy Rec:   Home  [ X ]   Home w/ PT  [  ]  Rehab  [  ]    Discussed with Cardiothoracic Team at AM rounds.

## 2023-09-09 NOTE — PROGRESS NOTE ADULT - PROBLEM SELECTOR PLAN 1
Structural Team Following   Continue with Plavix 75 mg PO daily   Continue with Mexiletine 200 mg every 8 hours   Continue with Atorvastatin 20 mg PO HS    Daily EKG   Increase activity as tolerated.   Encourage Chest PT / Pulmonary toileting and Incentive spirometry every 1 hour x 10 while awake.   Continue with PUD and DVT prophylaxis.   Daily Shower   D/C plan home in AM   Plan of care discussed with attending

## 2023-09-09 NOTE — PROGRESS NOTE ADULT - ASSESSMENT
72 F w/h/o uncontrolled T2DM (A1C 9.4%) on basal/bolus insulin PTA. Unknown DM complications. Also COPD, secondary adrenal Insufficiency on chronic steroids, colorectal cancer s/p resection (colostomy bag), Chronic A fib on Eliquis, and tracheomalacia and multiple intubations, type A aortic dissection s/p aortic dissection repair on 9/07/22, sepsis. Pt well known to this provider from prior admissions. Here with SOB> treated for PNA and on Prednisolone  16mg/day for AI.  Also found to have anemia and severe aortic stenosis> hematology and ct surgery following pt.  Endocrine consulted for assistance with uncontrolled DM. S/p CTSx 9/6 with BG above goal, noted patient has not received lantus in over 24 hours.     9/7 s/p TAVR #23 magaly, no conduction abnormality timi procedure.  Freestyle Luis E 3 if pt going home. Needs keshia on phone but pt states she wants daughter to do it.    9/8 VSS awaiting INR - echo this am- will d/c home Sunday if INR up.    9/9 VSS; INR 1.46 --> coumadin 5 mg PO tonight  Patient medically cleared for discharge home today per Dr. Leahy, but per patient daughter will not be able to pick her up until tomorrow.  Continue with current medication regimen.    Disposition: Home with [facility] in AM.

## 2023-09-09 NOTE — PROGRESS NOTE ADULT - NUTRITIONAL ASSESSMENT
This patient has been assessed with a concern for Malnutrition and has been determined to have a diagnosis/diagnoses of Moderate protein-calorie malnutrition.    This patient is being managed with:   Diet Consistent Carbohydrate w/Evening Snack-  Entered: Sep  8 2023  5:00PM

## 2023-09-09 NOTE — PROGRESS NOTE ADULT - PROBLEM SELECTOR PLAN 6
Continue with Mexiletine 200 mg every 8 hours   Coumadin 5 mg PO tonight for INR 1.46  Daily PT/ INR
PCP PPT: c/w bactrim   diet, dash consistent carb  Dvt: on Eliquis  Dispo: [facility]

## 2023-09-10 ENCOUNTER — TRANSCRIPTION ENCOUNTER (OUTPATIENT)
Age: 73
End: 2023-09-10

## 2023-09-10 VITALS
HEART RATE: 78 BPM | RESPIRATION RATE: 18 BRPM | OXYGEN SATURATION: 99 % | SYSTOLIC BLOOD PRESSURE: 114 MMHG | TEMPERATURE: 98 F | DIASTOLIC BLOOD PRESSURE: 68 MMHG

## 2023-09-10 LAB
ANION GAP SERPL CALC-SCNC: 14 MMOL/L — SIGNIFICANT CHANGE UP (ref 5–17)
BUN SERPL-MCNC: 15 MG/DL — SIGNIFICANT CHANGE UP (ref 7–23)
CALCIUM SERPL-MCNC: 8.9 MG/DL — SIGNIFICANT CHANGE UP (ref 8.4–10.5)
CHLORIDE SERPL-SCNC: 104 MMOL/L — SIGNIFICANT CHANGE UP (ref 96–108)
CO2 SERPL-SCNC: 23 MMOL/L — SIGNIFICANT CHANGE UP (ref 22–31)
CREAT SERPL-MCNC: 0.69 MG/DL — SIGNIFICANT CHANGE UP (ref 0.5–1.3)
EGFR: 91 ML/MIN/1.73M2 — SIGNIFICANT CHANGE UP
GLUCOSE BLDC GLUCOMTR-MCNC: 121 MG/DL — HIGH (ref 70–99)
GLUCOSE BLDC GLUCOMTR-MCNC: 150 MG/DL — HIGH (ref 70–99)
GLUCOSE BLDC GLUCOMTR-MCNC: 167 MG/DL — HIGH (ref 70–99)
GLUCOSE SERPL-MCNC: 135 MG/DL — HIGH (ref 70–99)
HCT VFR BLD CALC: 37.6 % — SIGNIFICANT CHANGE UP (ref 34.5–45)
HGB BLD-MCNC: 10.8 G/DL — LOW (ref 11.5–15.5)
INR BLD: 2.13 RATIO — HIGH (ref 0.85–1.18)
MCHC RBC-ENTMCNC: 23.4 PG — LOW (ref 27–34)
MCHC RBC-ENTMCNC: 28.7 GM/DL — LOW (ref 32–36)
MCV RBC AUTO: 81.4 FL — SIGNIFICANT CHANGE UP (ref 80–100)
NRBC # BLD: 0 /100 WBCS — SIGNIFICANT CHANGE UP (ref 0–0)
PLATELET # BLD AUTO: 247 K/UL — SIGNIFICANT CHANGE UP (ref 150–400)
POTASSIUM SERPL-MCNC: 4.1 MMOL/L — SIGNIFICANT CHANGE UP (ref 3.5–5.3)
POTASSIUM SERPL-SCNC: 4.1 MMOL/L — SIGNIFICANT CHANGE UP (ref 3.5–5.3)
PROTHROM AB SERPL-ACNC: 21.9 SEC — HIGH (ref 9.5–13)
RBC # BLD: 4.62 M/UL — SIGNIFICANT CHANGE UP (ref 3.8–5.2)
RBC # FLD: 30.6 % — HIGH (ref 10.3–14.5)
SODIUM SERPL-SCNC: 141 MMOL/L — SIGNIFICANT CHANGE UP (ref 135–145)
WBC # BLD: 9.84 K/UL — SIGNIFICANT CHANGE UP (ref 3.8–10.5)
WBC # FLD AUTO: 9.84 K/UL — SIGNIFICANT CHANGE UP (ref 3.8–10.5)

## 2023-09-10 PROCEDURE — 93010 ELECTROCARDIOGRAM REPORT: CPT

## 2023-09-10 PROCEDURE — 93306 TTE W/DOPPLER COMPLETE: CPT | Mod: 26

## 2023-09-10 RX ORDER — SIMETHICONE 80 MG/1
1 TABLET, CHEWABLE ORAL
Qty: 14 | Refills: 0
Start: 2023-09-10 | End: 2023-09-16

## 2023-09-10 RX ORDER — INSULIN LISPRO 100/ML
5 VIAL (ML) SUBCUTANEOUS
Qty: 1 | Refills: 0
Start: 2023-09-10 | End: 2023-10-09

## 2023-09-10 RX ORDER — MEXILETINE HYDROCHLORIDE 150 MG/1
1 CAPSULE ORAL
Qty: 90 | Refills: 0
Start: 2023-09-10 | End: 2023-10-09

## 2023-09-10 RX ORDER — INSULIN LISPRO 100/ML
16 VIAL (ML) SUBCUTANEOUS
Qty: 2 | Refills: 0
Start: 2023-09-10 | End: 2023-10-09

## 2023-09-10 RX ORDER — SENNA PLUS 8.6 MG/1
2 TABLET ORAL
Qty: 60 | Refills: 0
Start: 2023-09-10 | End: 2023-10-09

## 2023-09-10 RX ORDER — CLOPIDOGREL BISULFATE 75 MG/1
1 TABLET, FILM COATED ORAL
Qty: 30 | Refills: 0
Start: 2023-09-10 | End: 2023-10-09

## 2023-09-10 RX ORDER — FAMOTIDINE 10 MG/ML
1 INJECTION INTRAVENOUS
Qty: 60 | Refills: 0
Start: 2023-09-10 | End: 2023-10-09

## 2023-09-10 RX ORDER — INSULIN GLARGINE 100 [IU]/ML
35 INJECTION, SOLUTION SUBCUTANEOUS
Qty: 4 | Refills: 0
Start: 2023-09-10 | End: 2023-10-09

## 2023-09-10 RX ORDER — INSULIN LISPRO 100/ML
18 VIAL (ML) SUBCUTANEOUS
Qty: 4 | Refills: 0
Start: 2023-09-10 | End: 2023-10-09

## 2023-09-10 RX ADMIN — SODIUM CHLORIDE 3 MILLILITER(S): 9 INJECTION INTRAMUSCULAR; INTRAVENOUS; SUBCUTANEOUS at 08:16

## 2023-09-10 RX ADMIN — BUDESONIDE AND FORMOTEROL FUMARATE DIHYDRATE 2 PUFF(S): 160; 4.5 AEROSOL RESPIRATORY (INHALATION) at 05:38

## 2023-09-10 RX ADMIN — HEPARIN SODIUM 5000 UNIT(S): 5000 INJECTION INTRAVENOUS; SUBCUTANEOUS at 05:38

## 2023-09-10 RX ADMIN — HEPARIN SODIUM 5000 UNIT(S): 5000 INJECTION INTRAVENOUS; SUBCUTANEOUS at 13:01

## 2023-09-10 RX ADMIN — Medication 16 MILLIGRAM(S): at 05:37

## 2023-09-10 RX ADMIN — SODIUM CHLORIDE 3 MILLILITER(S): 9 INJECTION INTRAMUSCULAR; INTRAVENOUS; SUBCUTANEOUS at 13:15

## 2023-09-10 RX ADMIN — Medication 18 UNIT(S): at 08:25

## 2023-09-10 RX ADMIN — CLOPIDOGREL BISULFATE 75 MILLIGRAM(S): 75 TABLET, FILM COATED ORAL at 12:51

## 2023-09-10 RX ADMIN — TIOTROPIUM BROMIDE 2 PUFF(S): 18 CAPSULE ORAL; RESPIRATORY (INHALATION) at 12:51

## 2023-09-10 RX ADMIN — MEXILETINE HYDROCHLORIDE 200 MILLIGRAM(S): 150 CAPSULE ORAL at 05:37

## 2023-09-10 RX ADMIN — Medication 16 UNIT(S): at 12:08

## 2023-09-10 RX ADMIN — MEXILETINE HYDROCHLORIDE 200 MILLIGRAM(S): 150 CAPSULE ORAL at 13:02

## 2023-09-10 RX ADMIN — SIMETHICONE 80 MILLIGRAM(S): 80 TABLET, CHEWABLE ORAL at 04:51

## 2023-09-10 RX ADMIN — POLYETHYLENE GLYCOL 3350 17 GRAM(S): 17 POWDER, FOR SOLUTION ORAL at 05:02

## 2023-09-10 RX ADMIN — FAMOTIDINE 20 MILLIGRAM(S): 10 INJECTION INTRAVENOUS at 05:37

## 2023-09-10 RX ADMIN — Medication 30 MILLILITER(S): at 05:00

## 2023-09-10 RX ADMIN — Medication 600 MILLIGRAM(S): at 05:37

## 2023-09-10 RX ADMIN — Medication 3 MILLILITER(S): at 13:15

## 2023-09-10 NOTE — DISCHARGE NOTE NURSING/CASE MANAGEMENT/SOCIAL WORK - NSDCFUADDAPPT_GEN_ALL_CORE_FT
Please follow up with your pulmonologist and primary care doctor  Your Care Navigator Nurse Practitioner will be in touch to see you in your home within a few days from discharge. The [facility] Your [facility] program can help ensure you understand your medications, discharge instructions and answer any questions you may have at that time. They are also a great source to address concerns during the day and may be reached at [phone].

## 2023-09-10 NOTE — DISCHARGE NOTE NURSING/CASE MANAGEMENT/SOCIAL WORK - NSDCPEFALRISK_GEN_ALL_CORE
For information on Fall & Injury Prevention, visit: [email or web address] OR  [email or web address] OR  https://www.cdc.gov/steadi/patient.html

## 2023-09-10 NOTE — DISCHARGE NOTE NURSING/CASE MANAGEMENT/SOCIAL WORK - NSDCVIVACCINE_GEN_ALL_CORE_FT
COVID-19, mRNA, LNP-S, PF, 100 mcg/ 0.5 mL dose (Moderna); 06-Dec-2021 17:12; Afshan Lew (RADHA); [employer] [id number] (Exp. Date: 22-Dec-2021); IntraMuscular; Deltoid Left.; 0.25 milliLiter(s);

## 2023-09-10 NOTE — DISCHARGE NOTE NURSING/CASE MANAGEMENT/SOCIAL WORK - PATIENT PORTAL LINK FT
History of Present Illness


General


Chief Complaint:  Upper Respiratory Illness


Source:  Patient





Present Illness


HPI


Patient is a 28-year-old male who presented after having increased cough nasal 

congestion.  Patient denied any fever.  He stated he had been having a cold for 

the past 2 days.  The patient denied any abdominal pain or vomiting or chest 

pain.  Cough is nonproductive.  The patient stated that he had also had a sore 

throat.  The patient states that he had been having increased urethral 

discharge.  The patient is a dialysis patient stated he was dialyzed yesterday.


Allergies:  


Coded Allergies:  


     PENICILLINS (Verified  Allergy, Unknown, 1/14/17)





Patient History


Past Medical History:  see triage record


Reviewed Nursing Documentation:  PMH: Agreed, PSxH: Agreed





Nursing Documentation-PMH


Hx Hypertension:  Yes


Hx Diabetes:  Yes


Hx Dialysis:  Yes - Monday, Wednesday, Friday,ESRD; Shunt Right Arm





Review of Systems


All Other Systems:  negative except mentioned in HPI





Physical Exam





Vital Signs








  Date Time  Temp Pulse Resp B/P Pulse Ox O2 Delivery O2 Flow Rate FiO2


 


1/14/17 05:00 98.2 92 16 122/83 99 Room Air  








General Appearance:  well appearing, no apparent distress, alert, GCS 15


Head:  normocephalic, atraumatic


ENT:  hearing grossly normal, normal voice


Neck:  full range of motion, supple


Respiratory:  no respiratory distress, speaking full sentences


Musculoskeletal:  no calf tenderness


Neurologic:  normal inspection, alert, oriented x3, responsive, CNs III-XII nml 

as tested, normal gait


Psychiatric:  mood/affect normal


Skin:  no rash





Medical Decision Making


Diagnostic Impression:  


 Primary Impression:  


 ESRD (end stage renal disease) on dialysis


 Additional Impressions:  


 Urethritis


 Pharyngitis


ER Course


Patient presented for a sore throat and urethral discharge.  Differential 

diagnosis included was not limited to strep pharyngitis, urethritis, herpes, 

yeast infection and among others. Patient's benign exam and does not appear to 

require any further imaging or laboratory testing at this time.  The patient 

was given azithromycin a prescription for antibiotics.The patient is advised to 

follow up with  primary care doctor in 1-2 days. The patient was advised that 

he would need further STD testing.   Patient is advised to return if any 

worsening condition or if any changes in status that are concerning.





Last Vital Signs








  Date Time  Temp Pulse Resp B/P Pulse Ox O2 Delivery O2 Flow Rate FiO2


 


1/14/17 05:30 98.2 92 16 122/83 99 Room Air  








Status:  improved


Disposition:  HOME, SELF-CARE


Condition:  Stable


Scripts


Guaifenesin* (ADULT WAL-TUSSIN*) 100 Mg/5 Ml Liquid


5 ML ORAL Q4H, #120 ML


   Prov: Jose Suárez         1/14/17 


Doxycycline Monohydrate* (DOXYCYCLINE MONOHYDRATE*) 100 Mg Capsule


100 MG ORAL Q12H, #14 CAP 0 Refills


   Prov: Jose Suárez         1/14/17


Referrals:  


NOT CHOSEN IPA/MD,REFERRING (PCP)


Patient Instructions:  Upper Respiratory Infection, Adult, Urethritis, Adult











Jose Suárez Jan 14, 2017 08:45 You can access the FollowMyHealth Patient Portal offered by [facility] by registering at the following website: [email or web address]. By joining [employer]’s FollowMyHealth portal, you will also be able to view your health information using other applications (apps) compatible with our system.

## 2023-09-11 ENCOUNTER — APPOINTMENT (OUTPATIENT)
Dept: CARE COORDINATION | Facility: HOME HEALTH | Age: 73
End: 2023-09-11

## 2023-09-11 VITALS
HEART RATE: 74 BPM | RESPIRATION RATE: 16 BRPM | OXYGEN SATURATION: 99 % | DIASTOLIC BLOOD PRESSURE: 86 MMHG | SYSTOLIC BLOOD PRESSURE: 132 MMHG

## 2023-09-11 NOTE — CONSULT NOTE ADULT - CONSULT REQUESTED DATE/TIME
31-Oct-2017 14:16 was counselled about the importance of self-blood glucose monitoring and eating consistent carb diet to avoid blood sugar fluctuations   Patient will need routine diabetes maintenance and prevention  Discussed lifestyle changes including diet and exercise with patient  Pt will call if BS  <70      Dietary noncompliance  Discussed with patient the importance of eating consistent carbohydrate meals, avoiding high glycemic index food. Also, discussed with patient the risk and negative consequences of dietary noncompliance on blood glucose control, blood pressure and weight      Polyneuropathy  Was on gabapentin  Now off gabapentin due to recent CHF dx    CKD Stage 3B  Will monitor  Labs were taken while being hospitalized       I personally spent > 30 minutes  reviewing  external notes from PCP and other patient's care team providers, and personally interpreted labs associated with the above diagnosis. I also ordered labs to further assess and manage the above addressed medical conditions. Return in about 4 months (around 1/11/2024) for Type 2 DM . The above issues were reviewed with the patient who understood and agreed with the plan. Thank you for allowing us to participate in the care of this patient. Please do not hesitate to contact us with any additional questions. NEWTON Singh NP    [facility]   [address] [place] [id number]   Phone: [phone]  Fax: [phone]  --------------------------------------------  An electronic signature was used to authenticate this note.  NEWTON Singh NP on 9/11/2023 at 1:52 PM

## 2023-09-12 ENCOUNTER — APPOINTMENT (OUTPATIENT)
Dept: INTERNAL MEDICINE | Facility: CLINIC | Age: 73
End: 2023-09-12

## 2023-09-12 ENCOUNTER — APPOINTMENT (OUTPATIENT)
Dept: CARDIOTHORACIC SURGERY | Facility: CLINIC | Age: 73
End: 2023-09-12
Payer: MEDICARE

## 2023-09-12 ENCOUNTER — INPATIENT (INPATIENT)
Facility: HOSPITAL | Age: 73
LOS: 18 days | Discharge: HOME HEALTH | DRG: 987 | End: 2023-10-01
Attending: INTERNAL MEDICINE | Admitting: INTERNAL MEDICINE
Payer: MEDICARE

## 2023-09-12 VITALS
HEART RATE: 89 BPM | OXYGEN SATURATION: 97 % | HEIGHT: 67 IN | RESPIRATION RATE: 15 BRPM | BODY MASS INDEX: 22.44 KG/M2 | WEIGHT: 143 LBS | TEMPERATURE: 97.5 F | DIASTOLIC BLOOD PRESSURE: 82 MMHG | SYSTOLIC BLOOD PRESSURE: 151 MMHG

## 2023-09-12 VITALS
TEMPERATURE: 98 F | DIASTOLIC BLOOD PRESSURE: 83 MMHG | RESPIRATION RATE: 18 BRPM | SYSTOLIC BLOOD PRESSURE: 149 MMHG | HEIGHT: 67 IN | HEART RATE: 81 BPM | OXYGEN SATURATION: 98 % | WEIGHT: 143.08 LBS

## 2023-09-12 DIAGNOSIS — Z95.2 PRESENCE OF PROSTHETIC HEART VALVE: ICD-10-CM

## 2023-09-12 DIAGNOSIS — J44.1 CHRONIC OBSTRUCTIVE PULMONARY DISEASE WITH (ACUTE) EXACERBATION: ICD-10-CM

## 2023-09-12 DIAGNOSIS — Z98.890 OTHER SPECIFIED POSTPROCEDURAL STATES: Chronic | ICD-10-CM

## 2023-09-12 DIAGNOSIS — I50.9 HEART FAILURE, UNSPECIFIED: ICD-10-CM

## 2023-09-12 DIAGNOSIS — Z95.2 PRESENCE OF PROSTHETIC HEART VALVE: Chronic | ICD-10-CM

## 2023-09-12 DIAGNOSIS — E11.9 TYPE 2 DIABETES MELLITUS WITHOUT COMPLICATIONS: ICD-10-CM

## 2023-09-12 DIAGNOSIS — Z98.89 OTHER SPECIFIED POSTPROCEDURAL STATES: Chronic | ICD-10-CM

## 2023-09-12 DIAGNOSIS — H05.352 EXOSTOSIS OF LEFT ORBIT: Chronic | ICD-10-CM

## 2023-09-12 DIAGNOSIS — I48.91 UNSPECIFIED ATRIAL FIBRILLATION: ICD-10-CM

## 2023-09-12 DIAGNOSIS — Z87.09 PERSONAL HISTORY OF OTHER DISEASES OF THE RESPIRATORY SYSTEM: Chronic | ICD-10-CM

## 2023-09-12 DIAGNOSIS — Z96.653 PRESENCE OF ARTIFICIAL KNEE JOINT, BILATERAL: Chronic | ICD-10-CM

## 2023-09-12 DIAGNOSIS — K62.5 HEMORRHAGE OF ANUS AND RECTUM: Chronic | ICD-10-CM

## 2023-09-12 LAB
ALBUMIN SERPL ELPH-MCNC: 4.1 G/DL — SIGNIFICANT CHANGE UP (ref 3.3–5)
ALP SERPL-CCNC: 83 U/L — SIGNIFICANT CHANGE UP (ref 40–120)
ALT FLD-CCNC: 15 U/L — SIGNIFICANT CHANGE UP (ref 10–45)
ANION GAP SERPL CALC-SCNC: 14 MMOL/L — SIGNIFICANT CHANGE UP (ref 5–17)
AST SERPL-CCNC: 13 U/L — SIGNIFICANT CHANGE UP (ref 10–40)
BILIRUB SERPL-MCNC: 0.4 MG/DL — SIGNIFICANT CHANGE UP (ref 0.2–1.2)
BUN SERPL-MCNC: 20 MG/DL — SIGNIFICANT CHANGE UP (ref 7–23)
CALCIUM SERPL-MCNC: 8.4 MG/DL — SIGNIFICANT CHANGE UP (ref 8.4–10.5)
CHLORIDE SERPL-SCNC: 98 MMOL/L — SIGNIFICANT CHANGE UP (ref 96–108)
CO2 SERPL-SCNC: 22 MMOL/L — SIGNIFICANT CHANGE UP (ref 22–31)
CREAT SERPL-MCNC: 0.65 MG/DL — SIGNIFICANT CHANGE UP (ref 0.5–1.3)
EGFR: 92 ML/MIN/1.73M2 — SIGNIFICANT CHANGE UP
GLUCOSE BLDC GLUCOMTR-MCNC: 180 MG/DL — HIGH (ref 70–99)
GLUCOSE BLDC GLUCOMTR-MCNC: 328 MG/DL — HIGH (ref 70–99)
GLUCOSE BLDC GLUCOMTR-MCNC: 364 MG/DL — HIGH (ref 70–99)
GLUCOSE SERPL-MCNC: 356 MG/DL — HIGH (ref 70–99)
GRAM STN FLD: SIGNIFICANT CHANGE UP
HCT VFR BLD CALC: 36.6 % — SIGNIFICANT CHANGE UP (ref 34.5–45)
HGB BLD-MCNC: 10.4 G/DL — LOW (ref 11.5–15.5)
INR BLD: 1.27 RATIO — HIGH (ref 0.85–1.18)
MCHC RBC-ENTMCNC: 23.2 PG — LOW (ref 27–34)
MCHC RBC-ENTMCNC: 28.4 GM/DL — LOW (ref 32–36)
MCV RBC AUTO: 81.5 FL — SIGNIFICANT CHANGE UP (ref 80–100)
NRBC # BLD: 0 /100 WBCS — SIGNIFICANT CHANGE UP (ref 0–0)
PLATELET # BLD AUTO: 246 K/UL — SIGNIFICANT CHANGE UP (ref 150–400)
POTASSIUM SERPL-MCNC: 5.4 MMOL/L — HIGH (ref 3.5–5.3)
POTASSIUM SERPL-SCNC: 5.4 MMOL/L — HIGH (ref 3.5–5.3)
PROT SERPL-MCNC: 6.4 G/DL — SIGNIFICANT CHANGE UP (ref 6–8.3)
PROTHROM AB SERPL-ACNC: 13.2 SEC — HIGH (ref 9.5–13)
RAPID RVP RESULT: SIGNIFICANT CHANGE UP
RBC # BLD: 4.49 M/UL — SIGNIFICANT CHANGE UP (ref 3.8–5.2)
RBC # FLD: 28.2 % — HIGH (ref 10.3–14.5)
SARS-COV-2 RNA SPEC QL NAA+PROBE: SIGNIFICANT CHANGE UP
SODIUM SERPL-SCNC: 134 MMOL/L — LOW (ref 135–145)
SPECIMEN SOURCE: SIGNIFICANT CHANGE UP
WBC # BLD: 11.82 K/UL — HIGH (ref 3.8–10.5)
WBC # FLD AUTO: 11.82 K/UL — HIGH (ref 3.8–10.5)

## 2023-09-12 PROCEDURE — ZZZZZ: CPT

## 2023-09-12 PROCEDURE — 99213 OFFICE O/P EST LOW 20 MIN: CPT

## 2023-09-12 PROCEDURE — 93010 ELECTROCARDIOGRAM REPORT: CPT

## 2023-09-12 PROCEDURE — 71045 X-RAY EXAM CHEST 1 VIEW: CPT | Mod: 26

## 2023-09-12 PROCEDURE — 99222 1ST HOSP IP/OBS MODERATE 55: CPT

## 2023-09-12 RX ORDER — POLYETHYLENE GLYCOL 3350 17 G/17G
17 POWDER, FOR SOLUTION ORAL DAILY
Refills: 0 | Status: DISCONTINUED | OUTPATIENT
Start: 2023-09-12 | End: 2023-09-23

## 2023-09-12 RX ORDER — SENNA PLUS 8.6 MG/1
2 TABLET ORAL AT BEDTIME
Refills: 0 | Status: DISCONTINUED | OUTPATIENT
Start: 2023-09-12 | End: 2023-09-14

## 2023-09-12 RX ORDER — INSULIN LISPRO 100/ML
16 VIAL (ML) SUBCUTANEOUS
Refills: 0 | Status: DISCONTINUED | OUTPATIENT
Start: 2023-09-12 | End: 2023-09-13

## 2023-09-12 RX ORDER — INSULIN LISPRO 100/ML
VIAL (ML) SUBCUTANEOUS
Refills: 0 | Status: DISCONTINUED | OUTPATIENT
Start: 2023-09-12 | End: 2023-10-01

## 2023-09-12 RX ORDER — INSULIN LISPRO 100/ML
VIAL (ML) SUBCUTANEOUS AT BEDTIME
Refills: 0 | Status: DISCONTINUED | OUTPATIENT
Start: 2023-09-12 | End: 2023-09-14

## 2023-09-12 RX ORDER — SIMETHICONE 80 MG/1
80 TABLET, CHEWABLE ORAL EVERY 12 HOURS
Refills: 0 | Status: DISCONTINUED | OUTPATIENT
Start: 2023-09-12 | End: 2023-10-01

## 2023-09-12 RX ORDER — FUROSEMIDE 40 MG
20 TABLET ORAL ONCE
Refills: 0 | Status: COMPLETED | OUTPATIENT
Start: 2023-09-12 | End: 2023-09-12

## 2023-09-12 RX ORDER — CLOPIDOGREL BISULFATE 75 MG/1
75 TABLET, FILM COATED ORAL DAILY
Refills: 0 | Status: DISCONTINUED | OUTPATIENT
Start: 2023-09-12 | End: 2023-10-01

## 2023-09-12 RX ORDER — INSULIN LISPRO 100/ML
5 VIAL (ML) SUBCUTANEOUS
Refills: 0 | Status: DISCONTINUED | OUTPATIENT
Start: 2023-09-12 | End: 2023-09-12

## 2023-09-12 RX ORDER — ACETAMINOPHEN 500 MG
650 TABLET ORAL EVERY 6 HOURS
Refills: 0 | Status: DISCONTINUED | OUTPATIENT
Start: 2023-09-12 | End: 2023-10-01

## 2023-09-12 RX ORDER — MEXILETINE HYDROCHLORIDE 150 MG/1
200 CAPSULE ORAL EVERY 8 HOURS
Refills: 0 | Status: DISCONTINUED | OUTPATIENT
Start: 2023-09-12 | End: 2023-10-01

## 2023-09-12 RX ORDER — IPRATROPIUM/ALBUTEROL SULFATE 18-103MCG
3 AEROSOL WITH ADAPTER (GRAM) INHALATION EVERY 6 HOURS
Refills: 0 | Status: DISCONTINUED | OUTPATIENT
Start: 2023-09-12 | End: 2023-10-01

## 2023-09-12 RX ORDER — ATORVASTATIN CALCIUM 80 MG/1
20 TABLET, FILM COATED ORAL AT BEDTIME
Refills: 0 | Status: DISCONTINUED | OUTPATIENT
Start: 2023-09-12 | End: 2023-10-01

## 2023-09-12 RX ORDER — INSULIN LISPRO 100/ML
18 VIAL (ML) SUBCUTANEOUS
Refills: 0 | Status: DISCONTINUED | OUTPATIENT
Start: 2023-09-12 | End: 2023-09-14

## 2023-09-12 RX ORDER — FAMOTIDINE 10 MG/ML
20 INJECTION INTRAVENOUS
Refills: 0 | Status: DISCONTINUED | OUTPATIENT
Start: 2023-09-12 | End: 2023-09-20

## 2023-09-12 RX ORDER — INSULIN LISPRO 100/ML
VIAL (ML) SUBCUTANEOUS
Refills: 0 | Status: DISCONTINUED | OUTPATIENT
Start: 2023-09-12 | End: 2023-09-12

## 2023-09-12 RX ORDER — WARFARIN SODIUM 2.5 MG/1
5 TABLET ORAL ONCE
Refills: 0 | Status: COMPLETED | OUTPATIENT
Start: 2023-09-12 | End: 2023-09-12

## 2023-09-12 RX ORDER — SENNA PLUS 8.6 MG/1
2 TABLET ORAL AT BEDTIME
Refills: 0 | Status: DISCONTINUED | OUTPATIENT
Start: 2023-09-12 | End: 2023-10-01

## 2023-09-12 RX ORDER — TIOTROPIUM BROMIDE 18 UG/1
2 CAPSULE ORAL; RESPIRATORY (INHALATION) DAILY
Refills: 0 | Status: DISCONTINUED | OUTPATIENT
Start: 2023-09-12 | End: 2023-10-01

## 2023-09-12 RX ORDER — HYDROCORTISONE 1 %
1 OINTMENT (GRAM) TOPICAL
Refills: 0 | Status: DISCONTINUED | OUTPATIENT
Start: 2023-09-12 | End: 2023-10-01

## 2023-09-12 RX ORDER — BUDESONIDE AND FORMOTEROL FUMARATE DIHYDRATE 160; 4.5 UG/1; UG/1
2 AEROSOL RESPIRATORY (INHALATION)
Refills: 0 | Status: DISCONTINUED | OUTPATIENT
Start: 2023-09-12 | End: 2023-10-01

## 2023-09-12 RX ORDER — INSULIN GLARGINE 100 [IU]/ML
35 INJECTION, SOLUTION SUBCUTANEOUS AT BEDTIME
Refills: 0 | Status: DISCONTINUED | OUTPATIENT
Start: 2023-09-12 | End: 2023-09-15

## 2023-09-12 RX ADMIN — Medication 3 MILLILITER(S): at 17:06

## 2023-09-12 RX ADMIN — POLYETHYLENE GLYCOL 3350 17 GRAM(S): 17 POWDER, FOR SOLUTION ORAL at 17:08

## 2023-09-12 RX ADMIN — Medication 3 MILLILITER(S): at 13:45

## 2023-09-12 RX ADMIN — Medication 5: at 18:27

## 2023-09-12 RX ADMIN — Medication: at 16:43

## 2023-09-12 RX ADMIN — TIOTROPIUM BROMIDE 2 PUFF(S): 18 CAPSULE ORAL; RESPIRATORY (INHALATION) at 13:43

## 2023-09-12 RX ADMIN — INSULIN GLARGINE 35 UNIT(S): 100 INJECTION, SOLUTION SUBCUTANEOUS at 21:41

## 2023-09-12 RX ADMIN — SENNA PLUS 2 TABLET(S): 8.6 TABLET ORAL at 22:31

## 2023-09-12 RX ADMIN — Medication 3 MILLILITER(S): at 23:34

## 2023-09-12 RX ADMIN — MEXILETINE HYDROCHLORIDE 200 MILLIGRAM(S): 150 CAPSULE ORAL at 15:23

## 2023-09-12 RX ADMIN — BUDESONIDE AND FORMOTEROL FUMARATE DIHYDRATE 2 PUFF(S): 160; 4.5 AEROSOL RESPIRATORY (INHALATION) at 13:43

## 2023-09-12 RX ADMIN — Medication 5 UNIT(S): at 16:43

## 2023-09-12 RX ADMIN — WARFARIN SODIUM 5 MILLIGRAM(S): 2.5 TABLET ORAL at 15:23

## 2023-09-12 RX ADMIN — Medication 20 MILLIGRAM(S): at 16:28

## 2023-09-12 RX ADMIN — Medication 600 MILLIGRAM(S): at 17:10

## 2023-09-12 RX ADMIN — Medication 5 UNIT(S): at 18:28

## 2023-09-12 RX ADMIN — FAMOTIDINE 20 MILLIGRAM(S): 10 INJECTION INTRAVENOUS at 17:06

## 2023-09-12 RX ADMIN — ATORVASTATIN CALCIUM 20 MILLIGRAM(S): 80 TABLET, FILM COATED ORAL at 22:30

## 2023-09-12 RX ADMIN — Medication 1 APPLICATION(S): at 16:27

## 2023-09-12 RX ADMIN — MEXILETINE HYDROCHLORIDE 200 MILLIGRAM(S): 150 CAPSULE ORAL at 22:30

## 2023-09-12 NOTE — CONSULT NOTE ADULT - SUBJECTIVE AND OBJECTIVE BOX
HPI: 72 year old female with a complex pulmonary history including severe, life-threatening asthma (on chronic steroids and biologics), bronchiectasis, tracheomalacia s/p tracheoplasty who presents after a recent admission for bronchiectasis exacerbation (6/2023, treated with Ertapenem for ESBL proteus), and again in 9/2023 for an acute flare whose hospital course was complicated by identification of severe AS requiring valve in valve TAVR 9/7/23 discharged 9/10/23 who presents with increasing productive cough, dyspnea for which pulmonary is consulted.     Reports shor    PAST MEDICAL & SURGICAL HISTORY:  Atrial fibrillation  paroxysmal, on eliquis      Diabetes  Type 2      COPD (chronic obstructive pulmonary disease)      Adrenal insufficiency  Medrol daily for over 50 years      Aortic insufficiency  moderate AR on echo 5/3/2018      Pelvic fracture      Asthma      Tracheobronchomalacia  diagnosed 2015, s/p bronchial thermoplasty 2016 (Dr Zapien); recent bronchoscopy 6/5/2018 revealed no evidence of tracheobronchomalacia in trachea or bronchial tubes      Colorectal cancer  4/2018- last treatment , chemo and radiation      Rectal bleeding      Seizure  x 1 1/7/18      DVT (deep venous thrombosis)  15-20 years ago, took coumadin      TIA (transient ischemic attack)  multiple, last 5 years ago - presents as right-sided weakness      History of partial hysterectomy  30 years ago - fibroids      H/O total knee replacement, bilateral  5 years ago      History of sinus surgery  multiple sinus surgeries      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      H/O pelvic surgery  5 years ago - s/p fracture      History of tracheomalacia  2015 - attempted tracheal stenting ([facility])- course complicated by obstruction, respiratory failure, multiple CPR attempts -  stent discontinued; 10/20/2016 Tracheobronchoplasty (Prolene Mesh) performed at [facility] by Dr Zapien      S/P bronchoscopy  6/5/2018 - [place] (Dr Zapien) no evidence of tracheobronchomalacia in trachea or bronchial tubes      Rectal bleeding  exam under anesthesia ([facility]) 2/2018      S/P TAVR (transcatheter aortic valve replacement)      S/P aortic valve replacement          FAMILY HISTORY:  Family history of asthma    Family history of breast cancer (Sibling)    Family history of diabetes mellitus type II        SOCIAL HISTORY:  Smoking: __ packs x ___ years  EtOH Use:  Marital Status:  Occupation:  Exposures:  Recent Travel:    Allergies    tetanus toxoid (Short breath)  Dilaudid (Short breath)  codeine (Short breath)  iodine (Short breath; Swelling)  Avelox (Short breath; Pruritus)  shellfish (Anaphylaxis)  cefepime (Anaphylaxis)  aspirin (Short breath)  Valium (Short breath)  penicillin (Anaphylaxis)    Intolerances        HOME MEDICATIONS:    REVIEW OF SYSTEMS:  Constitutional: No fevers or chills. No weight loss. No fatigue or generalised malaise.  Eyes: No itching or discharge from the eyes  ENT: No ear pain. No ear discharge. No nasal congestion. No post nasal drip. No epistaxis. No throat pain. No sore throat. No difficulty swallowing.   CV: No chest pain. No palpitations. No lightheadedness or dizziness.   Resp: No dyspnea at rest. No dyspnea on exertion. No orthopnea. No wheezing. No cough. No stridor. No sputum production. No chest pain with respiration.  GI: No nausea. No vomiting. No diarrhea.  MSK: No joint pain or pain in any extremities  Integumentary: No skin lesions. No pedal edema.  Neurological: No gross motor weakness. No sensory changes.    [ ] All other systems negative  [ ] Unable to assess ROS because ________    OBJECTIVE:  ICU Vital Signs Last 24 Hrs  T(C): 36.6 (12 Sep 2023 10:52), Max: 36.6 (12 Sep 2023 10:52)  T(F): 97.9 (12 Sep 2023 10:52), Max: 97.9 (12 Sep 2023 10:52)  HR: 91 (12 Sep 2023 16:33) (81 - 91)  BP: 141/76 (12 Sep 2023 16:33) (141/76 - 149/83)  BP(mean): 97 (12 Sep 2023 16:33) (97 - 105)  ABP: --  ABP(mean): --  RR: 18 (12 Sep 2023 16:33) (18 - 18)  SpO2: 97% (12 Sep 2023 16:33) (97% - 98%)    O2 Parameters below as of 12 Sep 2023 16:33  Patient On (Oxygen Delivery Method): room air              09-12 @ 07:01  -  09-12 @ 16:52  --------------------------------------------------------  IN: 480 mL / OUT: 300 mL / NET: 180 mL      CAPILLARY BLOOD GLUCOSE      POCT Blood Glucose.: 328 mg/dL (12 Sep 2023 14:46)      PHYSICAL EXAM:  General: Awake, alert, oriented X 3.   HEENT: Atraumatic, normocephalic.                 Mallampatti Grade                 No nasal congestion.                No tonsillar or pharyngeal exudates.  Lymph Nodes: No palpable lymphadenopathy  Neck: No JVD. No carotid bruit.   Respiratory: Normal chest expansion                         Normal percussion                         Normal and equal air entry                         No wheeze, rhonchi or rales.  Cardiovascular: S1 S2 normal. No murmurs, rubs or gallops.   Abdomen: Soft, non-tender, non-distended. No organomegaly.  Extremities: Warm to touch. Peripheral pulse palpable. No pedal edema.   Skin: No rashes or skin lesions  Neurological: Motor and sensory examination equal and normal in all four extremities.  Psychiatry: Appropriate mood and affect.    HOSPITAL MEDICATIONS:  MEDICATIONS  (STANDING):  albuterol/ipratropium for Nebulization 3 milliLiter(s) Nebulizer every 6 hours  atorvastatin 20 milliGRAM(s) Oral at bedtime  budesonide  80 MICROgram(s)/formoterol 4.5 MICROgram(s) Inhaler 2 Puff(s) Inhalation two times a day  clopidogrel Tablet 75 milliGRAM(s) Oral daily  famotidine    Tablet 20 milliGRAM(s) Oral two times a day  guaiFENesin  milliGRAM(s) Oral every 12 hours  hydrocortisone 1% Cream 1 Application(s) Topical two times a day  insulin glargine Injectable (LANTUS) 35 Unit(s) SubCutaneous at bedtime  insulin lispro (ADMELOG) corrective regimen sliding scale   SubCutaneous Before meals and at bedtime  insulin lispro Injectable (ADMELOG) 16 Unit(s) SubCutaneous before lunch  insulin lispro Injectable (ADMELOG) 5 Unit(s) SubCutaneous before dinner  insulin lispro Injectable (ADMELOG) 18 Unit(s) SubCutaneous before breakfast  methylPREDNISolone 16 milliGRAM(s) Oral daily  mexiletine 200 milliGRAM(s) Oral every 8 hours  polyethylene glycol 3350 17 Gram(s) Oral daily  senna 2 Tablet(s) Oral at bedtime  senna 2 Tablet(s) Oral at bedtime  tiotropium 2.5 MICROgram(s) Inhaler 2 Puff(s) Inhalation daily    MEDICATIONS  (PRN):  acetaminophen     Tablet .. 650 milliGRAM(s) Oral every 6 hours PRN Temp greater or equal to 38C (100.4F), Mild Pain (1 - 3)  simethicone 80 milliGRAM(s) Chew every 12 hours PRN Gas      LABS:                        10.4   11.82 )-----------( 246      ( 12 Sep 2023 14:18 )             36.6     09-12    134<L>  |  98  |  20  ----------------------------<  356<H>  5.4<H>   |  22  |  0.65    Ca    8.4      12 Sep 2023 14:19    TPro  6.4  /  Alb  4.1  /  TBili  0.4  /  DBili  x   /  AST  13  /  ALT  15  /  AlkPhos  83  09-12    PT/INR - ( 12 Sep 2023 14:19 )   PT: 13.2 sec;   INR: 1.27 ratio           Urinalysis Basic - ( 12 Sep 2023 14:19 )    Color: x / Appearance: x / SG: x / pH: x  Gluc: 356 mg/dL / Ketone: x  / Bili: x / Urobili: x   Blood: x / Protein: x / Nitrite: x   Leuk Esterase: x / RBC: x / WBC x   Sq Epi: x / Non Sq Epi: x / Bacteria: x            MICROBIOLOGY:     RADIOLOGY:  [ ] Reviewed and interpreted by me    Point of Care Ultrasound Findings;    PFT:    EKG:   HPI: 72 year old female with a complex pulmonary history including severe, life-threatening asthma (on chronic steroids and biologics), ,,bronchiectasis, tracheomalacia s/p tracheoplasty who presents after a recent admission for bronchiectasis exacerbation (6/2023, treated with Ertapenem for ESBL proteus), and again in 9/2023 for an acute flare whose hospital course was complicated by identification of severe AS requiring valve in valve TAVR 9/7/23 discharged 9/10/23 who presents with increasing productive cough, dyspnea for which pulmonary is consulted.     Reports shortly after she left the hospital on Sunday she felt fatigued and a little dyspneic. On Monday, she noticed she developed a cough productive of thick green sputum and increasing dyspnea. Today, she went to follow up with cardiology and she became so dyspneic when ambulating from her car to the office that they recommended she come straight to the hospital.    She denies fever, chills, wheezing.      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  H/O pelvic surgery  5 years ago - s/p fracture    History of tracheomalacia  2015 - attempted tracheal stenting ([facility])- course complicated by obstruction, respiratory failure, multiple CPR attempts -  stent discontinued; 10/20/2016 Tracheobronchoplasty (Prolene Mesh) performed at [facility] by Dr Zapien  S/P bronchoscopy  6/5/2018 - [place] (Dr Zapien) no evidence of tracheobronchomalacia in trachea or bronchial tubes  S/P TAVR (transcatheter aortic valve replacement)  S/P aortic valve replacement          FAMILY HISTORY:  Family history of asthma    Family history of breast cancer (Sibling)    Family history of diabetes mellitus type II        SOCIAL HISTORY:  Denies alcohol use  From [place]    Allergies    tetanus toxoid (Short breath)  Dilaudid (Short breath)  codeine (Short breath)  iodine (Short breath; Swelling)  Avelox (Short breath; Pruritus)  shellfish (Anaphylaxis)  cefepime (Anaphylaxis)  aspirin (Short breath)  Valium (Short breath)  penicillin (Anaphylaxis)      HOME PULMONARY MEDICATIONS:  Methylprednisolone 16 mg  Incruse Ellipta   Breo Ellipta  Albuterol   Tezpire    REVIEW OF SYSTEMS:  Constitutional: No fevers or chills. No weight loss. + fatigue  CV: No chest pain. No palpitations. No lightheadedness or dizziness.   Resp: + dyspnea  + Cough No orthopnea. No wheezing. No stridor. No sputum production. No chest pain with respiration.    [x] All other systems negative  [ ] Unable to assess ROS because ________    OBJECTIVE:  ICU Vital Signs Last 24 Hrs  T(C): 36.6 (12 Sep 2023 10:52), Max: 36.6 (12 Sep 2023 10:52)  T(F): 97.9 (12 Sep 2023 10:52), Max: 97.9 (12 Sep 2023 10:52)  HR: 91 (12 Sep 2023 16:33) (81 - 91)  BP: 141/76 (12 Sep 2023 16:33) (141/76 - 149/83)  BP(mean): 97 (12 Sep 2023 16:33) (97 - 105)  RR: 18 (12 Sep 2023 16:33) (18 - 18)  SpO2: 97% (12 Sep 2023 16:33) (97% - 98%)    O2 Parameters below as of 12 Sep 2023 16:33  Patient On (Oxygen Delivery Method): room air    09-12 @ 07:01  -  09-12 @ 16:52  --------------------------------------------------------  IN: 480 mL / OUT: 300 mL / NET: 180 mL    PHYSICAL EXAM:  General: Awake, alert, oriented X 3.   Respiratory: Normal chest expansion                         Normal percussion                         Normal and equal air entry                         No wheeze, rhonchi or rales.  Cardiovascular: S1 S2 normal. No murmurs, rubs or gallops.   Abdomen: Soft, non-tender, non-distended. No organomegaly.  Extremities: Warm to touch. Peripheral pulse palpable. No pedal edema.   Skin: rash on right abdomen    HOSPITAL MEDICATIONS:  MEDICATIONS  (STANDING):  albuterol/ipratropium for Nebulization 3 milliLiter(s) Nebulizer every 6 hours  budesonide  80 MICROgram(s)/formoterol 4.5 MICROgram(s) Inhaler 2 Puff(s) Inhalation two times a day  methylPREDNISolone 16 milliGRAM(s) Oral daily  tiotropium 2.5 MICROgram(s) Inhaler 2 Puff(s) Inhalation daily    LABS:                        10.4   11.82 )-----------( 246      ( 12 Sep 2023 14:18 )             36.6     09-12    134<L>  |  98  |  20  ----------------------------<  356<H>  5.4<H>   |  22  |  0.65    Ca    8.4      12 Sep 2023 14:19    TPro  6.4  /  Alb  4.1  /  TBili  0.4  /  DBili  x   /  AST  13  /  ALT  15  /  AlkPhos  83  09-12    PT/INR - ( 12 Sep 2023 14:19 )   PT: 13.2 sec;   INR: 1.27 ratio               MICROBIOLOGY:   9/3/23: Moderate Proteus Mirabilis ESBL  8/18/23: MRSA  8/17/23: MRSA  6/9/23: Proteus Mirabilis ESBL  5/13/23: Proteus Mirabilis ESBL  10/4/22: MRSA  9/8/22:  Proteus Mirabilis ESBL  6/9/22:  Proteus Mirabilis ESBL  4/26/22: MRSA  3/29/22: MRSA    AFB Smears:   8/14/23: AFB negative  10/3/21: AFB negative    RADIOLOGY:  [x] Reviewed and interpreted by me  CXR 9/13/22: Prominent vascular markings, mild tram tracking towards the bases, flattened diaphragms  CT Chest 8/11/23: Multifocal tree in bud, bronchiectasis at bases    Point of Care Ultrasound Findings;

## 2023-09-12 NOTE — H&P ADULT - PROBLEM SELECTOR PLAN 2
-meet SIRS  -f/u blood culture, urine culture  -IVF   -monitor off abx for now dm diet  accuchecks ac and hs  continue lantus and admelog

## 2023-09-12 NOTE — H&P ADULT - NSHPPHYSICALEXAM_GEN_ALL_CORE
General: NAD  HEENT:  NC/AT  Neuro: A&Ox4, gait steady, speech clear, no focal deficits noted  Respiratory: B/L BS CTA, no wheeze, no rhonchi, no crackles noted  Cardiovascular: RRR, normal S1S2, no murmur noted  GI: Abd soft, NT/ND, +BSx4Q +BM  Peripheral Vascular:  B/L LE neg edema, 2+ peripheral pulses, no clubbing, cyanosis, varicosities/PVD noted  Musculoskeletal: B/L UE and LE 5/5 strength   Psychiatric: Normal mood, normal affect observed  Skin: Normal exam to inspection and palpation. no bleeding, no hematoma. General: NAD  HEENT:  NC/AT  Neuro: A&Ox4, gait steady, speech clear, no focal deficits noted  Respiratory: B/L Rhochi, RR easy unlabored   Cardiovascular: RSR 70-80;  normal S1S2, no murmur noted  GI: Abd soft, NT/ND, +BSx4Q +BM; obese abdomen   Peripheral Vascular:  B/L LE neg edema, 2+ peripheral pulses, no clubbing, cyanosis, varicosities/PVD noted  Musculoskeletal: B/L UE and LE 5/5 strength   Psychiatric: Normal mood, normal affect observed  Skin: bilateral groin sites cdi ayala- neg hematoma/bleeding; diffuse rash noted on back and abdomen

## 2023-09-12 NOTE — H&P ADULT - PROBLEM SELECTOR PLAN 3
-c/w Eliquis, Mexiletine  - EKG ordered continue mexitel 200 mg po q8   continue ac with coumadin  daily pt/ inr  monitor and supplement electrolytes

## 2023-09-12 NOTE — H&P ADULT - NSHPLABSRESULTS_GEN_ALL_CORE
.  LABS:                         10.0   10.56 )-----------( 227      ( 11 Aug 2023 16:05 )             35.8     08-11    139  |  101  |  14  ----------------------------<  272<H>  4.4   |  22  |  0.80    Ca    8.6      11 Aug 2023 16:05    TPro  6.1  /  Alb  4.3  /  TBili  0.6  /  DBili  x   /  AST  36  /  ALT  18  /  AlkPhos  86  08-11    PT/INR - ( 11 Aug 2023 16:06 )   PT: 12.0 sec;   INR: 1.09 ratio         PTT - ( 11 Aug 2023 16:06 )  PTT:28.0 sec  Urinalysis Basic - ( 11 Aug 2023 19:17 )    Color: Yellow / Appearance: Clear / S.020 / pH: x  Gluc: x / Ketone: Small  / Bili: Negative / Urobili: 2 mg/dL   Blood: x / Protein: 30 mg/dL / Nitrite: Negative   Leuk Esterase: Small / RBC: 4 /hpf / WBC 6 /HPF   Sq Epi: x / Non Sq Epi: x / Bacteria: Negative    < from: CT Chest No Cont (23 @ 17:50) >    IMPRESSION:  Compared to 2023:    New mucous secretions in the bronchus intermedius.    Unchanged infectious/inflammatory small airways disease in the right   middle/lower lobes with multifocal small tree-in-bud nodules.    Status post ascending aortic and aortic valve replacement, with residual   dissection flap better seen on the prior study.    Stable indeterminate small left renal nodule and numerous pancreatic   cysts.    < end of copied text > pending    tele: RSR [id number]

## 2023-09-12 NOTE — H&P ADULT - HISTORY OF PRESENT ILLNESS
71 yo PMHx  asthma on chronic steroids, bronchiectasis s/p surgery, allergic rhinitis,  recurrent PNA, PND, tracheomalacia s/p tracheoplasty, ESBL proteus infections (sputum),  diabetes, paroxysmal A-fib on Eliquis, colorectal cancer many years ago status post colostomy presenting with concern for shortness of breath. It started 2 and half weeks ago with dysnea on exertion, increased sputum production and running nose. She also endorsed sweatiness, chill, abd pain, denied fever, n/v/c/d, sick contact. she is up to date with vaccines. she has had chronic shortness of breath and cough for years. SHe had a recent hospitalization at an outside hospital  for acute respiratory failure with hypoxia secondary to asthma/COPD exacerbation and bronchopneumonia 6/5/2023 - 6/16/2023), She was found to have ESBL/Proteus/yeast in sputum culture and was treated with ertapenem/Benadryl/vancomycin/aztreonam and methylprednisolone.   She was discharged home with a midline and completed a course of antibiotics ertapenem (last dose 06/25).Dr. Allison also prescribed tobramycin after discharge,  and her metylprednisolone dose was reduced from 32 to 28 mg po daily from last monday to this monday. She states since dose reduction she has had worsened dyspnea. when she called Dr. Allison's office, he urged the patient to come to the ED for further management.     Of note had chronic dyspnea/severe asthma since childhood. She states she had a severe sinus infection requiring surgery at ?12 yo and since then has had difficulties with breathing. She has been on chronic steroids for over 20 years per report.. She uses Breo, Spiriva, albuterol neb and a cough assist device.     In the ED, BP stable 121/65, RR 22, saturating at RA at 100%, 97.5 temp. CBC has 10.56 HGB 10, platelet 227. chemistry normal except glucose 272. procal 0.13, trop 28. Last A1C 9.1 VBG 7.34, pCO2 is 52. CT showed new mucous secretions in the bronchus intermedius.Unchanged infectious/inflammatory small airways disease in the right middle/lower lobes with multifocal small tree-in-bud nodules.Status post ascending aortic and aortic valve replacement, with residual dissection flap better seen on the prior study.Stable indeterminate small left renal nodule and numerous pancreatic cysts. In the ED, she was on Duonebs, LR 500cc, ertapenem adn benadryl was given as well.  73 yo PMHx  asthma on chronic steroids, bronchiectasis s/p surgery, allergic rhinitis,  recurrent PNA, PND, tracheomalacia s/p tracheoplasty, ESBL proteus infections (sputum),  diabetes, paroxysmal A-fib on Eliquis, colorectal cancer many years ago status post colostomy presenting with concern for shortness of breath. It started 2 and half weeks ago with dysnea on exertion, increased sputum production and running nose. She also endorsed sweatiness, chill, abd pain, denied fever, n/v/c/d, sick contact. she is up to date with vaccines. she has had chronic shortness of breath and cough for years. SHe had a recent hospitalization at an outside hospital  for acute respiratory failure with hypoxia secondary to asthma/COPD exacerbation and bronchopneumonia 6/5/2023 - 6/16/2023), She was found to have ESBL/Proteus/yeast in sputum culture and was treated with ertapenem/Benadryl/vancomycin/aztreonam and methylprednisolone.   She was discharged home with a midline and completed a course of antibiotics ertapenem (last dose 06/25).Dr. Allison also prescribed tobramycin after discharge,  and her metylprednisolone dose was reduced from 32 to 28 mg po daily from last monday to this monday. She states since dose reduction she has had worsened dyspnea. when she called Dr. Allison's office, he urged the patient to come to the ED for further management.   s/p 9/6 TAVR- [facility]LIN; discharged home 9/10  pt presents to office for f/u visit this am w/ c/o of worsening SOB. Pt to be admitted for further workup and eval. Pt stable at this time. VSS; O2 sats 97% RA.       72F with with PMHF of asthma on chronic steroids, bronchiectasis, allergic rhinitis,  recurrent PNA,  tracheomalacia s/p tracheoplasty, ESBL proteus infections (sputum),  diabetes, paroxysmal A-fib on coumadin, colorectal cancer many years ago status post colostomy, recent hospitalization at an outside hospital  for acute respiratory failure with hypoxia secondary to asthma/COPD exacerbation and bronchopneumonia 6/5/2023 - 6/16/2023), and AVR 2022 with Dr. Cabrera.  Pt admits to chronic SOB and cough for years and is up to date on vaccines. Dr. Allison follows as an outpatient for her COPD. s/p 9/6 TAVR- MERLIN with Dr. Gonsalves and Dr. Leahy. discharged home 9/10. Pt presents to office today for f/u visit  w/ c/o of worsening SOB. Pt to be admitted for further workup and eval. Pt stable at this time. VSS; O2 sats 97% RA.

## 2023-09-12 NOTE — H&P ADULT - NSHPREVIEWOFSYSTEMS_GEN_ALL_CORE
Review of Systems:  Constitutional: No fever, No weight loss, good appetite/po intake  Head: No headache   Eyes: No blurry vision, No diplopia  Neuro: No tremors, No muscle weakness   Cardiovascular: + chest pain, No palpitations  Respiratory: + SOB, + cough  GI: No nausea, No vomiting, No diarrhea  : No dysuria, No hematuria  Skin: No rash  MSK: No joint pain   Psych: No depression  Heme: No abnormal bruising, no abnormal bleeding Review of Systems:  Constitutional: No fever, No weight loss, good appetite/po intake  Head: No headache   Eyes: No blurry vision, No diplopia  Neuro: No tremors, No muscle weakness   Cardiovascular:  chest pain, No palpitations  Respiratory: + SOB, + cough   GI: No nausea, No vomiting, No diarrhea  : No dysuria, No hematuria  Skin: No rash  MSK: No joint pain   Psych: No depression  Heme: No abnormal bruising, no abnormal bleeding

## 2023-09-12 NOTE — H&P ADULT - NSICDXPASTSURGICALHX_GEN_ALL_CORE_FT
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    H/O pelvic surgery 5 years ago - s/p fracture    H/O total knee replacement, bilateral 5 years ago    History of partial hysterectomy 30 years ago - fibroids    History of sinus surgery multiple sinus surgeries    History of tracheomalacia 2015 - attempted tracheal stenting ([facility])- course complicated by obstruction, respiratory failure, multiple CPR attempts -  stent discontinued; 10/20/2016 Tracheobronchoplasty (Prolene Mesh) performed at [facility] by Dr Zapien    Rectal bleeding exam under anesthesia ([facility]) 2/2018    S/P bronchoscopy 6/5/2018 - [place] (Dr Zapien) no evidence of tracheobronchomalacia in trachea or bronchial tubes     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    H/O pelvic surgery 5 years ago - s/p fracture    H/O total knee replacement, bilateral 5 years ago    History of partial hysterectomy 30 years ago - fibroids    History of sinus surgery multiple sinus surgeries    History of tracheomalacia 2015 - attempted tracheal stenting ([facility])- course complicated by obstruction, respiratory failure, multiple CPR attempts -  stent discontinued; 10/20/2016 Tracheobronchoplasty (Prolene Mesh) performed at [facility] by Dr Zapien    Rectal bleeding exam under anesthesia ([facility]) 2/2018    S/P bronchoscopy 6/5/2018 - [place] (Dr Zapien) no evidence of tracheobronchomalacia in trachea or bronchial tubes    S/P TAVR (transcatheter aortic valve replacement)      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    H/O pelvic surgery 5 years ago - s/p fracture    H/O total knee replacement, bilateral 5 years ago    History of partial hysterectomy 30 years ago - fibroids    History of sinus surgery multiple sinus surgeries    History of tracheomalacia 2015 - attempted tracheal stenting ([facility])- course complicated by obstruction, respiratory failure, multiple CPR attempts -  stent discontinued; 10/20/2016 Tracheobronchoplasty (Prolene Mesh) performed at [facility] by Dr Zapien    Rectal bleeding exam under anesthesia ([facility]) 2/2018    S/P aortic valve replacement     S/P bronchoscopy 6/5/2018 - [place] (Dr Zapien) no evidence of tracheobronchomalacia in trachea or bronchial tubes    S/P TAVR (transcatheter aortic valve replacement)

## 2023-09-12 NOTE — H&P ADULT - PROBLEM SELECTOR PLAN 1
- CT chest showed increased bronchial secretions, on RA saturating well, speaking in full sentences, unclear trigger, likely due to infection, or inflammation.     - Sputum cultures , urine legionella, strep   - RVP negative  - hypertonic saline and albuterol neb q6h standing, followed by use of airway clerance device (patient brought her own from home)  - chest PT as tolerated   - budesonide neb q12h standing   - Monitor off antibiotics  - increase methylprednisolone to 40 mg IV bid for now, with taper to 40 mg IV daily in 1-2 days   - pulm following continue plavix and coumadin for AC  bilateral groin sites stable  ck ekg  no echo at this time as per Dr. Leahy

## 2023-09-12 NOTE — H&P ADULT - PROBLEM SELECTOR PLAN 4
-on 25-30 lantus at home, and 7-20 mealtime as well  -ISS moderate risk   -change to 20 lantus, and 7 meal time for now.  - Will likely need to uptitrate given increase in steroids and hx of uncontrolled diabetes. pulm following- Dr. Allison to see pt today  monitor O2 sats  ck chest xray  continue bronchodilators  continue symbicort and spiriva

## 2023-09-12 NOTE — CHART NOTE - NSCHARTNOTEFT_GEN_A_CORE
72 y.o. F with PMH of asthma on chronic steroids, bronchiectasis, allergic rhinitis,  recurrent PNA,  tracheomalacia s/p tracheoplasty, ESBL proteus infections (sputum),  diabetes, paroxysmal A-fib on coumadin, colorectal cancer many years ago status post colostomy, recent hospitalization at an outside hospital  for acute respiratory failure with hypoxia secondary to asthma/COPD exacerbation and bronchopneumonia 6/5/2023 - 6/16/2023), and AVR 2022 with Dr. Cabrera.  Pt admits to chronic SOB and cough for years and is up to date on vaccines. Dr. Allison follows as an outpatient for her COPD. s/p 9/6 TAVR- [facility] with Dr. Gonsalves and Dr. Leahy. discharged home 9/10. Pt presents to office today for f/u visit  w/ c/o of worsening SOB. Pt to be admitted for further workup and eval. Pt stable at this time. VSS; O2 sats 97% RA.    Endocrinology consulted for uncontrolled T2DM    #Uncontrolled Type 2 Diabetes Mellitus   - Hemoglobin A1c: 5.7% 9/2023  - home regimen: lantus 35 units qhs; humalog 18/16/18 units TIDAC and 5 units qhs for bedtime snack  - glucose 300s  - no evidence of DKA on labs  - on medrol 18mg daily (home regimen)  INPATIENT PLAN:  - Recommend Lantus 35 units QHS   - Recommend Admelog 18/16/18 units TID before meals   - Recommend moderate dose admelog correction scale TIDQAC and separate moderate dose scale QHS  - Please check FSG before meals and QHS, or q6h while NPO  - Inpatient glucose goals: <140 pre-meal, <180 random  - consistent carb diet when eating  - please contact endo team w/ any changes to steroid regimen    Full consult to be seen in AM    Bradley Zamora MD  Endocrine Fellow  For nonurgent matters, please email [email or web address] or [email or web address]. For urgent follow up questions, discharge recommendations, or new consults please call answering service at [phone] (weekdays), [phone] (nights/weekends).

## 2023-09-12 NOTE — CONSULT NOTE ADULT - ASSESSMENT
72 year old female with a complex pulmonary history including severe, life-threatening asthma (on chronic steroids and biologics), ,,bronchiectasis, tracheomalacia s/p tracheoplasty who presents after a recent admission for bronchiectasis exacerbation (6/2023, treated with Ertapenem for ESBL proteus), and again in 9/2023 for an acute flare whose hospital course was complicated by identification of severe AS requiring valve in valve TAVR 9/7/23 discharged 9/10/23 who presents with increasing productive cough, dyspnea for which pulmonary is consulted.     Suspect she is exacerbating her bronchiectasis. Would cover her for known pathogens, MRSA and Proteus. Resend sputum for routine culture. Her chronic steroid use, bronchiectasis puts her at risk for DIEUDONNE and her imaging appears consistent and could explain some of her recurrent flares and symptoms. Would start work up and send 3 sputum AFB's.    Recommend:   - Routine sputum culture, urine strep/legionella, RVP, COVID  - 3 sputum AFB   - Bronchodilator: Standing duonebs q 4 hours, nebulized pulmicort  - Continue steroids at 16 mg for now  - Ensure she is getting twice per day airway clearance: Albuterol nebulizer followed by saline nebulizer followed by cough assist device and encouragement to cough and clear  - Pulmonary will continue to follow  - Would consult dermatology for her rash 72 year old female with a complex pulmonary history including severe, life-threatening asthma (on chronic steroids and biologics), ,,bronchiectasis, tracheomalacia s/p tracheoplasty who presents after a recent admission for bronchiectasis exacerbation (6/2023, treated with Ertapenem for ESBL proteus), and again in 9/2023 for an acute flare whose hospital course was complicated by identification of severe AS requiring valve in valve TAVR 9/7/23 discharged 9/10/23 who presents with increasing productive cough, dyspnea for which pulmonary is consulted.     Suspect she is exacerbating her bronchiectasis. Would cover her for known pathogens, MRSA and Proteus. Resend sputum for routine culture. Her chronic steroid use, bronchiectasis puts her at risk for DIEUDONNE and her imaging appears consistent and could explain some of her recurrent flares and symptoms. Would start work up and send 3 sputum AFB's.    Recommend:   - Routine sputum culture, urine strep/legionella, RVP, COVID  - 3 sputum AFB   - Considering culture history would start ertapenem and vancomycin for MRSA  - Bronchodilator: Standing duonebs q 4 hours, nebulized pulmicort  - Continue steroids at 16 mg for now  - Ensure she is getting twice per day airway clearance: Albuterol nebulizer followed by saline nebulizer followed by cough assist device and encouragement to cough and clear  - Pulmonary will continue to follow  - Would consult dermatology for her rash

## 2023-09-12 NOTE — PATIENT PROFILE ADULT - NSTOBACCONEVERSMOKERY/N_GEN_A
"PEDIATRIC GASTROENTEROLOGY/NUTRITION PROGRESS NOTE                                      Mervin Lala MD  Referred by Yelitza Grullon M.D.  Primary doctor Staci Hernandez M.D.    S: Karlene is a 3 wk.o. male with  Chief complaint: Cholestasis    Parents report that he is taking Nutramigen.  Nursing staff reports patient is becoming fatigued from frequent attempts at breast-feeding. Mother continues to nurse.  Stools continue to be pigmented.    D bili decreased to 1.7  Gained 45 grams  RADHA +gallbladder  Cardiac echo normal  Cortisol 1.4  Prolactin 45.1  Chest x-ray-no butterfly vertebrae    Nutrition recommends: \"Recommendations/Plan:  1. Offer 2 oz bottle breast milk or 2 oz Nutramigen after breastfeeding x8 feedings/day (16 oz/day Nutramigen provides 325 kcal, 9g protein)  2.   Monitor weight    O:  BP (!) 81/45   Pulse 157   Temp 37.1 °C (98.7 °F) (Axillary)   Resp 46   Ht 0.457 m (1' 6\")   Wt 3 kg (6 lb 9.8 oz)   HC 35.6 cm (14\")   SpO2 96% Weight change: 0.045 kg (1.6 oz)    Intake/Output Summary (Last 24 hours) at 06/09/19 0700  Last data filed at 06/09/19 0400   Gross per 24 hour   Intake              279 ml   Output              573 ml   Net             -294 ml       PHYSICAL EXAM  Alert, icteric, in no distress  HEENT:atraumatic cranium, sclera anicteric  COR: No murmur  ABDO: Non-distended, +BS, No HSM, no masses, no tenderness  EXT: No CEC  SKIN: Warm.  Decreased subcutaneous fat stores  NEURO: Intact    MEDICATIONS  No current facility-administered medications for this encounter.        Last reviewed on 2019  6:00 PM by Machelle C. Delos Reyes, R.N.    LABS  Recent Labs      06/07/19   1045  06/07/19   1830  06/08/19   1550   ALTSGPT   --   9  10   ASTSGOT   --   22  32   ALKPHOSPHAT   --   331  370   TBILIRUBIN  15.8*  14.5*  13.9*   DBILIRUBIN  2.7*  2.5*  1.7*   GAMMAGT   --   200*   --    GLUCOSE   --   64  74     Recent Labs      06/07/19   1830 06/08/19   1550   SODIUM  140  137 "   POTASSIUM  4.2  4.6   CHLORIDE  109  109   CO2  23  19*   GLUCOSE  64  74   BUN  9  9     Recent Labs      19   1835   HEMOGLOBIN  14.0   HEMATOCRIT  37.6     Results     ** No results found for the last 168 hours. **        Recent Labs      19   1830   INR  1.01   APTT  37.1*     Results for KRISTIN AYERS (MRN [id number]) as of 2019 09:14   Ref. Range 2019 04:25   Cortisol Latest Ref Range: 0.0 - 23.0 ug/dL 1.4     Results for KRISTIN AYERS (MRN [id number]) as of 2019 09:14   Ref. Range 2019 00:00   TSH Latest Ref Range: 0.790 - 5.850 uIU/mL 6.080 (H)   Free T-4 Latest Ref Range: 0.53 - 1.43 ng/dL 1.42     Results for KRISTIN AYERS (MRN [id number]) as of 2019 09:14   Ref. Range 2019 05:50   Hepatitis A Virus Ab, IgM Latest Ref Range: Negative  Negative   Hepatitis B Surface Antigen Latest Ref Range: Negative  Negative   Hepatitis B Cors Ab,IgM Latest Ref Range: Negative  Negative   Hepatitis C Antibody Latest Ref Range: Negative  Negative     Results for KRISTIN AYERS (MRN [id number]) as of 2019 09:14   Ref. Range 2019 04:25   Prolactin Latest Units: ng/mL 45.10       IMAGING  DX-CHEST-PORTABLE (1 VIEW)   Final Result      Negative single view of the chest.      US-RUQ   Final Result      Negative right upper quadrant/gallbladder ultrasound      US-RUQ   Final Result      1.  Limited exam secondary to bowel gas.      2.  Unremarkable liver and biliary tree ultrasound.         EC-ECHOCARDIOGRAM COMPLETE W/O CONT    (Results Pending)     Negative right upper quadrant/gallbladder ultrasound-gallbladder visualized      PROCEDURES       CONSULTATIONS  Cardiology, nutrition, endocrine    ASSESSMENT  There are no active problems to display for this patient.     cholestasis    Assessment: Patient continues to have pigmented stools and ultrasounds demonstrates the presence of a gallbladder.  Endocrine evaluation in progress- hypothyroidism?,  cardiac evaluation negative, he has multiple test pending and we will attempt to collect saliva for genetic screening tomorrow.  His direct bilirubin has decreased to 1.7.  Evidence is pointing against a extrahepatic cause.    Plan:  1.  Continue with serial liver enzyme panels   2.  Continue to check on pending test results.    Growth failure:    Assessment: 45 g weight gain over the last 24 hours.  I am concerned about the patient's fatigue with attempted breast-feeding burning of calories.  I expect that the current formula will allow him to absorb more of the medium chain triglycerides.    Plan:  1.  I recommend that mother be limited to 10 minutes of nursing followed by offering the baby formula every 3 hours  2.  I recommended the patient demonstrate adequate calorie intake and normal weight gain over a 72-hour, prior to considering discharge      Discussed with parents and Dr. Espinoza       No

## 2023-09-12 NOTE — H&P ADULT - ASSESSMENT
72 F w/h/o uncontrolled T2DM (A1C 9.4%) on basal/bolus insulin PTA. Unknown DM complications. Also COPD, secondary adrenal Insufficiency on chronic steroids, colorectal cancer s/p resection (colostomy bag), Chronic A fib on Eliquis, and tracheomalacia and multiple intubations, type A aortic dissection s/p aortic dissection repair on 9/07/22, sepsis. Pt well known to this provider from prior admissions. Here with SOB> treated for PNA and on Prednisolone  16mg/day for AI.  Also found to have anemia and severe aortic stenosis> hematology and ct surgery following pt.  Endocrine consulted for assistance with uncontrolled DM. S/p CTSx 9/6 with BG above goal, noted patient has not received lantus in over 24 hours.     9/7 s/p TAVR #23 magaly, no conduction abnormality timi procedure.  Freestyle Luis E 3 if pt going home. Needs keshia on phone but pt states she wants daughter to do it.    9/8 VSS awaiting INR - echo this am- will d/c home Sunday if INR up.    9/9 VSS; INR 1.46 --> coumadin 5 mg PO tonight  Patient medically cleared for discharge home today per Dr. Leahy, but per patient daughter will not be able to pick her up until tomorrow.  Continue with current medication regimen.    Disposition: Home with [facility] in AM.     72 y.o. F with PMH of asthma on chronic steroids, bronchiectasis, allergic rhinitis,  recurrent PNA,  tracheomalacia s/p tracheoplasty, ESBL proteus infections (sputum),  diabetes, paroxysmal A-fib on coumadin, colorectal cancer many years ago status post colostomy, recent hospitalization at an outside hospital  for acute respiratory failure with hypoxia secondary to asthma/COPD exacerbation and bronchopneumonia 6/5/2023 - 6/16/2023), and AVR 2022 with Dr. Cabrera.  Pt admits to chronic SOB and cough for years and is up to date on vaccines. Dr. Allison follows as an outpatient for her COPD. s/p 9/6 TAVR- [facility] with Dr. Gonsalves and Dr. Leahy. discharged home 9/10. Pt presents to office today for f/u visit  w/ c/o of worsening SOB. Pt to be admitted for further workup and eval. Pt stable at this time. VSS; O2 sats 97% RA.

## 2023-09-12 NOTE — H&P ADULT - NSVTERISKASSESS_GEN_ALL_CORE FT
VTE Present on Admission, Assessment Completed on: 12-Sep-2023 15:50 [place] Pregnancy And Lactation Text: This medication is Pregnancy Category X and contraindicated in pregnancy and in women who may become pregnant. It is unknown if this medication is excreted in breast milk.

## 2023-09-13 ENCOUNTER — APPOINTMENT (OUTPATIENT)
Dept: INTERNAL MEDICINE | Facility: CLINIC | Age: 73
End: 2023-09-13

## 2023-09-13 LAB
ALBUMIN SERPL ELPH-MCNC: 3.9 G/DL — SIGNIFICANT CHANGE UP (ref 3.3–5)
ALP SERPL-CCNC: 71 U/L — SIGNIFICANT CHANGE UP (ref 40–120)
ALT FLD-CCNC: 15 U/L — SIGNIFICANT CHANGE UP (ref 10–45)
ANION GAP SERPL CALC-SCNC: 13 MMOL/L — SIGNIFICANT CHANGE UP (ref 5–17)
AST SERPL-CCNC: 26 U/L — SIGNIFICANT CHANGE UP (ref 10–40)
BILIRUB DIRECT SERPL-MCNC: <0.1 MG/DL — SIGNIFICANT CHANGE UP (ref 0–0.3)
BILIRUB INDIRECT FLD-MCNC: >0.1 MG/DL — LOW (ref 0.2–1)
BILIRUB SERPL-MCNC: 0.2 MG/DL — SIGNIFICANT CHANGE UP (ref 0.2–1.2)
BUN SERPL-MCNC: 16 MG/DL — SIGNIFICANT CHANGE UP (ref 7–23)
CALCIUM SERPL-MCNC: 8.9 MG/DL — SIGNIFICANT CHANGE UP (ref 8.4–10.5)
CHLORIDE SERPL-SCNC: 101 MMOL/L — SIGNIFICANT CHANGE UP (ref 96–108)
CO2 SERPL-SCNC: 26 MMOL/L — SIGNIFICANT CHANGE UP (ref 22–31)
CREAT SERPL-MCNC: 0.71 MG/DL — SIGNIFICANT CHANGE UP (ref 0.5–1.3)
EGFR: 89 ML/MIN/1.73M2 — SIGNIFICANT CHANGE UP
GLUCOSE BLDC GLUCOMTR-MCNC: 123 MG/DL — HIGH (ref 70–99)
GLUCOSE BLDC GLUCOMTR-MCNC: 191 MG/DL — HIGH (ref 70–99)
GLUCOSE BLDC GLUCOMTR-MCNC: 237 MG/DL — HIGH (ref 70–99)
GLUCOSE BLDC GLUCOMTR-MCNC: 304 MG/DL — HIGH (ref 70–99)
GLUCOSE BLDC GLUCOMTR-MCNC: 79 MG/DL — SIGNIFICANT CHANGE UP (ref 70–99)
GLUCOSE SERPL-MCNC: 193 MG/DL — HIGH (ref 70–99)
GRAM STN FLD: SIGNIFICANT CHANGE UP
HCT VFR BLD CALC: 35.1 % — SIGNIFICANT CHANGE UP (ref 34.5–45)
HGB BLD-MCNC: 10 G/DL — LOW (ref 11.5–15.5)
INR BLD: 1.52 RATIO — HIGH (ref 0.85–1.18)
MCHC RBC-ENTMCNC: 23.1 PG — LOW (ref 27–34)
MCHC RBC-ENTMCNC: 28.5 GM/DL — LOW (ref 32–36)
MCV RBC AUTO: 81.3 FL — SIGNIFICANT CHANGE UP (ref 80–100)
NRBC # BLD: 0 /100 WBCS — SIGNIFICANT CHANGE UP (ref 0–0)
PLATELET # BLD AUTO: 234 K/UL — SIGNIFICANT CHANGE UP (ref 150–400)
POTASSIUM SERPL-MCNC: 4.5 MMOL/L — SIGNIFICANT CHANGE UP (ref 3.5–5.3)
POTASSIUM SERPL-MCNC: 4.5 MMOL/L — SIGNIFICANT CHANGE UP (ref 3.5–5.3)
POTASSIUM SERPL-SCNC: 4.5 MMOL/L — SIGNIFICANT CHANGE UP (ref 3.5–5.3)
POTASSIUM SERPL-SCNC: 4.5 MMOL/L — SIGNIFICANT CHANGE UP (ref 3.5–5.3)
PROT SERPL-MCNC: 6.2 G/DL — SIGNIFICANT CHANGE UP (ref 6–8.3)
PROTHROM AB SERPL-ACNC: 15.8 SEC — HIGH (ref 9.5–13)
RBC # BLD: 4.32 M/UL — SIGNIFICANT CHANGE UP (ref 3.8–5.2)
RBC # FLD: 27.9 % — HIGH (ref 10.3–14.5)
SODIUM SERPL-SCNC: 140 MMOL/L — SIGNIFICANT CHANGE UP (ref 135–145)
WBC # BLD: 9.69 K/UL — SIGNIFICANT CHANGE UP (ref 3.8–10.5)
WBC # FLD AUTO: 9.69 K/UL — SIGNIFICANT CHANGE UP (ref 3.8–10.5)

## 2023-09-13 PROCEDURE — 99231 SBSQ HOSP IP/OBS SF/LOW 25: CPT

## 2023-09-13 PROCEDURE — 99222 1ST HOSP IP/OBS MODERATE 55: CPT

## 2023-09-13 PROCEDURE — 99232 SBSQ HOSP IP/OBS MODERATE 35: CPT | Mod: FS

## 2023-09-13 PROCEDURE — 74018 RADEX ABDOMEN 1 VIEW: CPT | Mod: 26

## 2023-09-13 RX ORDER — WARFARIN SODIUM 2.5 MG/1
2.5 TABLET ORAL ONCE
Refills: 0 | Status: COMPLETED | OUTPATIENT
Start: 2023-09-13 | End: 2023-09-13

## 2023-09-13 RX ORDER — INSULIN LISPRO 100/ML
18 VIAL (ML) SUBCUTANEOUS
Refills: 0 | Status: DISCONTINUED | OUTPATIENT
Start: 2023-09-13 | End: 2023-09-14

## 2023-09-13 RX ORDER — POLYETHYLENE GLYCOL 3350 17 G/17G
17 POWDER, FOR SOLUTION ORAL
Refills: 0 | Status: DISCONTINUED | OUTPATIENT
Start: 2023-09-13 | End: 2023-10-01

## 2023-09-13 RX ADMIN — POLYETHYLENE GLYCOL 3350 17 GRAM(S): 17 POWDER, FOR SOLUTION ORAL at 08:42

## 2023-09-13 RX ADMIN — POLYETHYLENE GLYCOL 3350 17 GRAM(S): 17 POWDER, FOR SOLUTION ORAL at 15:28

## 2023-09-13 RX ADMIN — MEXILETINE HYDROCHLORIDE 200 MILLIGRAM(S): 150 CAPSULE ORAL at 22:04

## 2023-09-13 RX ADMIN — Medication 1 APPLICATION(S): at 17:40

## 2023-09-13 RX ADMIN — INSULIN GLARGINE 35 UNIT(S): 100 INJECTION, SOLUTION SUBCUTANEOUS at 22:03

## 2023-09-13 RX ADMIN — MEXILETINE HYDROCHLORIDE 200 MILLIGRAM(S): 150 CAPSULE ORAL at 13:09

## 2023-09-13 RX ADMIN — Medication 3 MILLILITER(S): at 17:39

## 2023-09-13 RX ADMIN — Medication 3 MILLILITER(S): at 11:03

## 2023-09-13 RX ADMIN — Medication 600 MILLIGRAM(S): at 05:57

## 2023-09-13 RX ADMIN — SENNA PLUS 2 TABLET(S): 8.6 TABLET ORAL at 22:04

## 2023-09-13 RX ADMIN — MEXILETINE HYDROCHLORIDE 200 MILLIGRAM(S): 150 CAPSULE ORAL at 05:56

## 2023-09-13 RX ADMIN — Medication 2: at 08:41

## 2023-09-13 RX ADMIN — BUDESONIDE AND FORMOTEROL FUMARATE DIHYDRATE 2 PUFF(S): 160; 4.5 AEROSOL RESPIRATORY (INHALATION) at 17:39

## 2023-09-13 RX ADMIN — TIOTROPIUM BROMIDE 2 PUFF(S): 18 CAPSULE ORAL; RESPIRATORY (INHALATION) at 08:42

## 2023-09-13 RX ADMIN — BUDESONIDE AND FORMOTEROL FUMARATE DIHYDRATE 2 PUFF(S): 160; 4.5 AEROSOL RESPIRATORY (INHALATION) at 05:57

## 2023-09-13 RX ADMIN — CLOPIDOGREL BISULFATE 75 MILLIGRAM(S): 75 TABLET, FILM COATED ORAL at 08:42

## 2023-09-13 RX ADMIN — Medication 1 APPLICATION(S): at 06:02

## 2023-09-13 RX ADMIN — Medication 16 MILLIGRAM(S): at 05:57

## 2023-09-13 RX ADMIN — FAMOTIDINE 20 MILLIGRAM(S): 10 INJECTION INTRAVENOUS at 17:40

## 2023-09-13 RX ADMIN — WARFARIN SODIUM 2.5 MILLIGRAM(S): 2.5 TABLET ORAL at 22:12

## 2023-09-13 RX ADMIN — Medication 8: at 16:54

## 2023-09-13 RX ADMIN — Medication 3 MILLILITER(S): at 05:57

## 2023-09-13 RX ADMIN — Medication 600 MILLIGRAM(S): at 17:40

## 2023-09-13 RX ADMIN — Medication 18 UNIT(S): at 16:54

## 2023-09-13 RX ADMIN — ATORVASTATIN CALCIUM 20 MILLIGRAM(S): 80 TABLET, FILM COATED ORAL at 22:04

## 2023-09-13 RX ADMIN — Medication 18 UNIT(S): at 08:41

## 2023-09-13 RX ADMIN — FAMOTIDINE 20 MILLIGRAM(S): 10 INJECTION INTRAVENOUS at 05:57

## 2023-09-13 NOTE — CONSULT NOTE ADULT - SUBJECTIVE AND OBJECTIVE BOX
HPI:  72F with with PMHF of asthma on chronic steroids, bronchiectasis, allergic rhinitis,  recurrent PNA,  tracheomalacia s/p tracheoplasty, ESBL proteus infections (sputum),  diabetes, paroxysmal A-fib on coumadin, colorectal cancer many years ago status post colostomy, recent hospitalization at an outside hospital  for acute respiratory failure with hypoxia secondary to asthma/COPD exacerbation and bronchopneumonia 6/5/2023 - 6/16/2023), and AVR 2022 with Dr. Cabrera.  Pt admits to chronic SOB and cough for years and is up to date on vaccines. Dr. Allison follows as an outpatient for her COPD. s/p 9/6 TAVR- [facility] with Dr. Gonsalves and Dr. Leahy. discharged home 9/10. Pt presents to office today for f/u visit  w/ c/o of worsening SOB, admission for further workup and eval.       Endocrine history   T2DM (in setting of chronic steroid use for asthma) - currently on maintenance dose of medrol 16mg daily   Diagnosed approx 3 years ago on routine labs  Endocrinologist: yes, patient cant remember the name as not seen for >1 year  HbA1c 5.7% (Hb 10.0)  Home regimen: Lantus 18 units pre bed and sliding scale of humolog usually around 15units TID pre meals. Was previously on oral medications, but all ceased for many years now  Checks BGs 4x per day fasting 80-90, pre lunch 120, pre dinner peak usually around 200 and pre bed around 150  Hypos rare  eGFR 89  Complications, denies any microvascular cx, denies IHD, but has had multiple TIAs, also had an aortic dissection in Sep last year   Denies ETOH/Smoke  Lives with daughter currently       PAST MEDICAL & SURGICAL HISTORY:  Atrial fibrillation  paroxysmal, on eliquis      Diabetes  Type 2      COPD (chronic obstructive pulmonary disease)      Adrenal insufficiency  Medrol daily for over 50 years      Aortic insufficiency  moderate AR on echo 5/3/2018      Pelvic fracture      Asthma      Tracheobronchomalacia  diagnosed 2015, s/p bronchial thermoplasty 2016 (Dr Zapien); recent bronchoscopy 6/5/2018 revealed no evidence of tracheobronchomalacia in trachea or bronchial tubes      Colorectal cancer  4/2018- last treatment , chemo and radiation      Rectal bleeding      Seizure  x 1 1/7/18      DVT (deep venous thrombosis)  15-20 years ago, took coumadin      TIA (transient ischemic attack)  multiple, last 5 years ago - presents as right-sided weakness      History of partial hysterectomy  30 years ago - fibroids      H/O total knee replacement, bilateral  5 years ago      History of sinus surgery  multiple sinus surgeries      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 by Dr Zapien      S/P bronchoscopy  6/5/2018 - [place] (Dr Zapien) no evidence of tracheobronchomalacia in trachea or bronchial tubes      Rectal bleeding  exam under anesthesia ([facility]) 2/2018      S/P TAVR (transcatheter aortic valve replacement)      S/P aortic valve replacement          FAMILY HISTORY:  Family history of asthma    Family history of breast cancer (Sibling)    Family history of diabetes mellitus type II        Social History:    Outpatient Medications:    MEDICATIONS  (STANDING):  albuterol/ipratropium for Nebulization 3 milliLiter(s) Nebulizer every 6 hours  atorvastatin 20 milliGRAM(s) Oral at bedtime  budesonide  80 MICROgram(s)/formoterol 4.5 MICROgram(s) Inhaler 2 Puff(s) Inhalation two times a day  clopidogrel Tablet 75 milliGRAM(s) Oral daily  famotidine    Tablet 20 milliGRAM(s) Oral two times a day  guaiFENesin  milliGRAM(s) Oral every 12 hours  hydrocortisone 1% Cream 1 Application(s) Topical two times a day  insulin glargine Injectable (LANTUS) 35 Unit(s) SubCutaneous at bedtime  insulin lispro (ADMELOG) corrective regimen sliding scale   SubCutaneous three times a day before meals  insulin lispro (ADMELOG) corrective regimen sliding scale   SubCutaneous at bedtime  insulin lispro Injectable (ADMELOG) 16 Unit(s) SubCutaneous before lunch  insulin lispro Injectable (ADMELOG) 18 Unit(s) SubCutaneous before dinner  insulin lispro Injectable (ADMELOG) 18 Unit(s) SubCutaneous before breakfast  methylPREDNISolone 16 milliGRAM(s) Oral daily  mexiletine 200 milliGRAM(s) Oral every 8 hours  polyethylene glycol 3350 17 Gram(s) Oral daily  senna 2 Tablet(s) Oral at bedtime  senna 2 Tablet(s) Oral at bedtime  tiotropium 2.5 MICROgram(s) Inhaler 2 Puff(s) Inhalation daily    MEDICATIONS  (PRN):  acetaminophen     Tablet .. 650 milliGRAM(s) Oral every 6 hours PRN Temp greater or equal to 38C (100.4F), Mild Pain (1 - 3)  simethicone 80 milliGRAM(s) Chew every 12 hours PRN Gas      Allergies    tetanus toxoid (Short breath)  Dilaudid (Short breath)  codeine (Short breath)  iodine (Short breath; Swelling)  Avelox (Short breath; Pruritus)  shellfish (Anaphylaxis)  cefepime (Anaphylaxis)  aspirin (Short breath)  Valium (Short breath)  penicillin (Anaphylaxis)    Intolerances      Review of Systems:  Constitutional: No fevers or chills. Does report fatigue  CV: No chest pain. No palpitations. No lightheadedness or dizziness.   Resp: + dyspnea  + Cough No orthopnea. No wheezing. No stridor. No sputum production. No chest pain with respiration.  Other systems negative     PHYSICAL EXAM:  VITALS: T(C): 36.9 (09-13-23 @ 05:29)  T(F): 98.4 (09-13-23 @ 05:29), Max: 98.5 (09-12-23 @ 20:03)  HR: 85 (09-13-23 @ 05:29) (81 - 91)  BP: 125/66 (09-13-23 @ 05:29) (125/66 - 149/83)  RR:  (18 - 18)  SpO2:  (97% - 100%)  Wt(kg): --  GENERAL: NAD, well-groomed, well-developed  HEENT:  Atraumatic, Normocephalic, moist mucous membranes  RESPIRATORY: Clear to auscultation bilaterally; No rales, rhonchi, wheezing, or rubs  CARDIOVASCULAR: Regular rate and rhythm; No murmurs; no peripheral edema  GI: Soft, nontender, non distended, normal bowel sounds      POCT Blood Glucose.: 191 mg/dL (09-13-23 @ 08:29)  POCT Blood Glucose.: 237 mg/dL (09-13-23 @ 02:04)  POCT Blood Glucose.: 180 mg/dL (09-12-23 @ 20:56)  POCT Blood Glucose.: 364 mg/dL (09-12-23 @ 18:23)  POCT Blood Glucose.: 328 mg/dL (09-12-23 @ 14:46)  POCT Blood Glucose.: 121 mg/dL (09-10-23 @ 11:46)                            10.0   9.69  )-----------( 234      ( 13 Sep 2023 06:39 )             35.1       09-13    140  |  101  |  16  ----------------------------<  193<H>  4.5   |  26  |  0.71    eGFR: 89    Ca    8.9      09-13    TPro  6.4  /  Alb  4.1  /  TBili  0.4  /  DBili  x   /  AST  13  /  ALT  15  /  AlkPhos  83  09-12      Thyroid Function Tests:  09-06 @ 09:32 TSH 0.49 FreeT4 1.2 T3 82 Anti TPO -- Anti Thyroglobulin Ab -- TSI --              Radiology:

## 2023-09-13 NOTE — PROGRESS NOTE ADULT - PROBLEM SELECTOR PLAN 6
Continue with Mexiletine 200 mg every 8 hours   Coumadin 5 mg PO tonight for INR 1.46  Daily PT/ INR

## 2023-09-13 NOTE — CONSULT NOTE ADULT - ASSESSMENT
72 year old female with a complex pulmonary history including severe, life-threatening asthma (on chronic steroids and biologics), ,,bronchiectasis, tracheomalacia s/p tracheoplasty who presents after a recent admission for bronchiectasis exacerbation (6/2023, treated with Ertapenem for ESBL proteus), and again in 9/2023 for an acute flare whose hospital course was complicated by identification of severe AS requiring valve in valve TAVR 9/7/23 discharged 9/10/23 who presents with increasing productive cough, dyspnea. Endocrine consult for management of hyperglycemia in the setting of steroids.    1. T2DM, on steroids   -Aim -180mg/dL  -Continue lantus 35units pre bed  -Increase admelog to 18units TID pre meals (as peak in BGs usually pre dinner) HOLD if NPO/skip meals   -Continue moderate ademlog correction scale   -Hypoglycemia protocol   -Carb consistent diet   -Please notify endocrine if changes to methylprednisone doses as insulin doses will also need to be changed accordingly     D/C planning   Patient to organize own private endo follow up  D/c on basal bolus, doses to be determined closer to time of discharge     2. HLD  Check lipids   Continue atorvastatin if no contraindications  72 year old female with a complex pulmonary history including severe, life-threatening asthma (on chronic steroids and biologics), ,,bronchiectasis, tracheomalacia s/p tracheoplasty who presents after a recent admission for bronchiectasis exacerbation (6/2023, treated with Ertapenem for ESBL proteus), and again in 9/2023 for an acute flare whose hospital course was complicated by identification of severe AS requiring valve in valve TAVR 9/7/23 discharged 9/10/23 who presents with increasing productive cough, dyspnea. Endocrine consult for management of hyperglycemia in the setting of steroids.    1. T2DM, on steroids   -Aim -180mg/dL  -Continue lantus 35units pre bed  -Increase admelog to 18units TID pre meals (as peak in BGs usually pre dinner) HOLD if NPO/skip meals   -Continue moderate ademlog correction scale   -Hypoglycemia protocol   -Carb consistent diet   -Please notify endocrine if changes to methylprednisolone doses as insulin doses will also need to be changed accordingly     D/C planning   Patient to organize own private endo follow up  D/c on basal bolus, doses to be determined closer to time of discharge     2. HLD  Check lipids   Continue atorvastatin if no contraindications

## 2023-09-13 NOTE — PROGRESS NOTE ADULT - ASSESSMENT
72 y.o. F with PMH of asthma on chronic steroids, bronchiectasis, allergic rhinitis,  recurrent PNA,  tracheomalacia s/p tracheoplasty, ESBL proteus infections (sputum),  diabetes, paroxysmal A-fib on coumadin, colorectal cancer many years ago status post colostomy, recent hospitalization at an outside hospital  for acute respiratory failure with hypoxia secondary to asthma/COPD exacerbation and bronchopneumonia 6/5/2023 - 6/16/2023), and AVR 2022 with Dr. Cabrera.  Pt admits to chronic SOB and cough for years and is up to date on vaccines. Dr. Allison follows as an outpatient for her COPD. s/p 9/6 TAVR- [facility] with Dr. Gonsalves and Dr. Leahy. discharged home 9/10. Pt presents to office today for f/u visit  w/ c/o of worsening SOB. Pt to be admitted for further workup and eval. Pt stable at this time. VSS; O2 sats 97% RA.  ,H/o  ,bronchiectasis, tracheomalacia s/p tracheoplasty who presents after a recent admission for bronchiectasis exacerbation (6/2023, treated with Ertapenem for ESBL proteus), and again in 9/2023 for an acute flare whose hospital course was complicated by identification of severe AS requiring valve in valve TAVR 9/7/23 9/13 Pulm eval /rec Routine sputum culture, urine strep/legionella, RVP, COVID  3 sputum AFB   Considering culture history would start ertapenem and vancomycin for MRSA   Bronchodilator: Standing duonebs q 4 hours, nebulized pulmicort  - Continue steroids at 16 mg for now  - Ensure she is getting twice per day airway clearance: Albuterol nebulizer followed by saline nebulizer followed by cough assist device and encouragement to cough and clear  - Pulmonary will continue to follow  - Would consult dermatology for her rash    F/u  cultures, ambulation.  Coumadin, h/o paf  D/c planning when stable

## 2023-09-13 NOTE — PROGRESS NOTE ADULT - SUBJECTIVE AND OBJECTIVE BOX
VITAL SIGNS    Telemetry:  nsr 86    Vital Signs Last 24 Hrs  T(C): 36.9 (09-13-23 @ 05:29), Max: 36.9 (09-12-23 @ 20:03)  T(F): 98.4 (09-13-23 @ 05:29), Max: 98.5 (09-12-23 @ 20:03)  HR: 85 (09-13-23 @ 05:29) (83 - 91)  BP: 125/66 (09-13-23 @ 05:29) (125/66 - 141/76)  RR: 18 (09-13-23 @ 05:29) (18 - 18)  SpO2: 100% (09-13-23 @ 05:29) (97% - 100%)                   09-12 @ 07:01  -  09-13 @ 07:00  --------------------------------------------------------  IN: 480 mL / OUT: 1800 mL / NET: -1320 mL    09-13 @ 07:01  -  09-13 @ 11:03  --------------------------------------------------------  IN: 10 mL / OUT: 700 mL / NET: -690 mL          Daily     Daily             CAPILLARY BLOOD GLUCOSE      POCT Blood Glucose.: 191 mg/dL (13 Sep 2023 08:29)  POCT Blood Glucose.: 237 mg/dL (13 Sep 2023 02:04)  POCT Blood Glucose.: 180 mg/dL (12 Sep 2023 20:56)  POCT Blood Glucose.: 364 mg/dL (12 Sep 2023 18:23)  POCT Blood Glucose.: 328 mg/dL (12 Sep 2023 14:46)                Coumadin    [ ] YES          [x  ]      NO                                   PHYSICAL EXAM        Neurology: alert and oriented x 3, nonfocal, no gross deficits  CV : s1 s2 RRR    Lungs: cta  Abdomen: soft, nontender, nondistended, positive bowel sounds, last bowel movement , colostomy + stool                    :    voiding  Extremities:   -   edema   /  -   calve tenderness ,  groins cdi          acetaminophen     Tablet .. 650 milliGRAM(s) Oral every 6 hours PRN  albuterol/ipratropium for Nebulization 3 milliLiter(s) Nebulizer every 6 hours  atorvastatin 20 milliGRAM(s) Oral at bedtime  budesonide  80 MICROgram(s)/formoterol 4.5 MICROgram(s) Inhaler 2 Puff(s) Inhalation two times a day  clopidogrel Tablet 75 milliGRAM(s) Oral daily  famotidine    Tablet 20 milliGRAM(s) Oral two times a day  guaiFENesin  milliGRAM(s) Oral every 12 hours  hydrocortisone 1% Cream 1 Application(s) Topical two times a day  insulin glargine Injectable (LANTUS) 35 Unit(s) SubCutaneous at bedtime  insulin lispro (ADMELOG) corrective regimen sliding scale   SubCutaneous at bedtime  insulin lispro (ADMELOG) corrective regimen sliding scale   SubCutaneous three times a day before meals  insulin lispro Injectable (ADMELOG) 18 Unit(s) SubCutaneous before lunch  insulin lispro Injectable (ADMELOG) 18 Unit(s) SubCutaneous before dinner  insulin lispro Injectable (ADMELOG) 18 Unit(s) SubCutaneous before breakfast  methylPREDNISolone 16 milliGRAM(s) Oral daily  mexiletine 200 milliGRAM(s) Oral every 8 hours  polyethylene glycol 3350 17 Gram(s) Oral daily  senna 2 Tablet(s) Oral at bedtime  senna 2 Tablet(s) Oral at bedtime  simethicone 80 milliGRAM(s) Chew every 12 hours PRN  tiotropium 2.5 MICROgram(s) Inhaler 2 Puff(s) Inhalation daily                    Physical Therapy Rec:   Home  [  ]   Home w/ PT  [  ]  Rehab  [  ]  Discussed with Cardiothoracic Team at AM rounds.

## 2023-09-13 NOTE — PROGRESS NOTE ADULT - ASSESSMENT
72 year old female with a complex pulmonary history including severe, life-threatening asthma (on chronic steroids and biologics), bronchiectasis, tracheomalacia s/p tracheoplasty who presents after a recent admission for bronchiectasis exacerbation (6/2023, treated with Ertapenem for ESBL proteus), and again in 9/2023 for an acute flare whose hospital course was complicated by identification of severe AS requiring valve in valve TAVR 9/7/23 discharged 9/10/23 who presents with increasing productive cough, dyspnea for which pulmonary is consulted.     Suspect she is exacerbating her bronchiectasis. Would cover her for known pathogens, MRSA and Proteus. Resend sputum for routine culture. Her chronic steroid use, bronchiectasis puts her at risk for DIEUDONNE and her imaging appears consistent and could explain some of her recurrent flares and symptoms. Would start work up and send 3 sputum AFB's. Continue steroids at her current dose, with low threshold to increase.    Recommend:   - Routine sputum culture, urine strep/legionella, RVP, COVID  - 3 sputum AFB   - Considering culture history would start ertapenem and vancomycin for MRSA  - Bronchodilator: Standing duonebs q 4 hours, nebulized pulmicort  - Continue steroids at 16 mg for now  - Ensure she is getting twice per day airway clearance: Albuterol nebulizer followed by saline nebulizer followed by cough assist device and encouragement to cough and clear  - Pulmonary will continue to follow  - Would consult dermatology for her rash

## 2023-09-13 NOTE — PROGRESS NOTE ADULT - SUBJECTIVE AND OBJECTIVE BOX
Interval Events:   - Breathing stable  - Still endorsing dyspnea and productive cough  - Denies new respiratory symptoms  - No wheeze    REVIEW OF SYSTEMS:  Constitutional: No fevers, chills, weight loss   CV: Denies chest pain, palpitations   Resp: [ ] cough [ ] shortness of breath [ ] dyspnea [ ] wheezing [ ] sputum [ ] hemoptysis  [ ] All other systems negative  [ ] Unable to assess ROS because ________    OBJECTIVE:  ICU Vital Signs Last 24 Hrs  T(C): 36.9 (13 Sep 2023 05:29), Max: 36.9 (12 Sep 2023 20:03)  T(F): 98.4 (13 Sep 2023 05:29), Max: 98.5 (12 Sep 2023 20:03)  HR: 85 (13 Sep 2023 05:29) (83 - 91)  BP: 125/66 (13 Sep 2023 05:29) (125/66 - 141/76)  BP(mean): 97 (12 Sep 2023 16:33) (97 - 97)  ABP: --  ABP(mean): --  RR: 18 (13 Sep 2023 05:29) (18 - 18)  SpO2: 100% (13 Sep 2023 05:29) (97% - 100%)    O2 Parameters below as of 13 Sep 2023 05:29  Patient On (Oxygen Delivery Method): room air              09-12 @ 07:01  -  09-13 @ 07:00  --------------------------------------------------------  IN: 480 mL / OUT: 1800 mL / NET: -1320 mL    09-13 @ 07:01  -  09-13 @ 11:54  --------------------------------------------------------  IN: 10 mL / OUT: 700 mL / NET: -690 mL      CAPILLARY BLOOD GLUCOSE      POCT Blood Glucose.: 191 mg/dL (13 Sep 2023 08:29)      PHYSICAL EXAM:  General:   HEENT:   Lymph Nodes: No palpable lymphadenopathy  Neck:   Respiratory:   Cardiovascular:   Abdomen: SNT, +BS, No R/G/R  Extremities:   Skin:   Neurological:      HOSPITAL MEDICATIONS:  MEDICATIONS  (STANDING):  albuterol/ipratropium for Nebulization 3 milliLiter(s) Nebulizer every 6 hours  atorvastatin 20 milliGRAM(s) Oral at bedtime  budesonide  80 MICROgram(s)/formoterol 4.5 MICROgram(s) Inhaler 2 Puff(s) Inhalation two times a day  clopidogrel Tablet 75 milliGRAM(s) Oral daily  famotidine    Tablet 20 milliGRAM(s) Oral two times a day  guaiFENesin  milliGRAM(s) Oral every 12 hours  hydrocortisone 1% Cream 1 Application(s) Topical two times a day  insulin glargine Injectable (LANTUS) 35 Unit(s) SubCutaneous at bedtime  insulin lispro (ADMELOG) corrective regimen sliding scale   SubCutaneous at bedtime  insulin lispro (ADMELOG) corrective regimen sliding scale   SubCutaneous three times a day before meals  insulin lispro Injectable (ADMELOG) 18 Unit(s) SubCutaneous before dinner  insulin lispro Injectable (ADMELOG) 18 Unit(s) SubCutaneous before lunch  insulin lispro Injectable (ADMELOG) 18 Unit(s) SubCutaneous before breakfast  methylPREDNISolone 16 milliGRAM(s) Oral daily  mexiletine 200 milliGRAM(s) Oral every 8 hours  polyethylene glycol 3350 17 Gram(s) Oral daily  senna 2 Tablet(s) Oral at bedtime  senna 2 Tablet(s) Oral at bedtime  tiotropium 2.5 MICROgram(s) Inhaler 2 Puff(s) Inhalation daily  warfarin 2.5 milliGRAM(s) Oral once    MEDICATIONS  (PRN):  acetaminophen     Tablet .. 650 milliGRAM(s) Oral every 6 hours PRN Temp greater or equal to 38C (100.4F), Mild Pain (1 - 3)  simethicone 80 milliGRAM(s) Chew every 12 hours PRN Gas      LABS:                        10.0   9.69  )-----------( 234      ( 13 Sep 2023 06:39 )             35.1     Hgb Trend: 10.0<--, 10.4<--, 10.8<--, 9.0<--, 8.5<--  09-13    140  |  101  |  16  ----------------------------<  193<H>  4.5   |  26  |  0.71    Ca    8.9      13 Sep 2023 06:41    TPro  6.4  /  Alb  4.1  /  TBili  0.4  /  DBili  x   /  AST  13  /  ALT  15  /  AlkPhos  83  09-12    Creatinine Trend: 0.71<--, 0.65<--, 0.69<--, 0.62<--, 0.63<--, 0.58<--  PT/INR - ( 13 Sep 2023 06:39 )   PT: 15.8 sec;   INR: 1.52 ratio           Urinalysis Basic - ( 13 Sep 2023 06:41 )    Color: x / Appearance: x / SG: x / pH: x  Gluc: 193 mg/dL / Ketone: x  / Bili: x / Urobili: x   Blood: x / Protein: x / Nitrite: x   Leuk Esterase: x / RBC: x / WBC x   Sq Epi: x / Non Sq Epi: x / Bacteria: x          MICROBIOLOGY:     RADIOLOGY:  [ ] Reviewed and interpreted by me   Interval Events:   - Breathing stable  - Still endorsing dyspnea and productive cough  - Denies new respiratory symptoms  - No wheeze    REVIEW OF SYSTEMS:  Constitutional: No fevers, chills, weight loss   CV: Denies chest pain, palpitations   Resp: [ ] cough [ ] shortness of breath [ ] dyspnea [ ] wheezing [ ] sputum [ ] hemoptysis  [x] All other systems negative  [ ] Unable to assess ROS because ________    OBJECTIVE:  ICU Vital Signs Last 24 Hrs  T(C): 36.9 (13 Sep 2023 05:29), Max: 36.9 (12 Sep 2023 20:03)  T(F): 98.4 (13 Sep 2023 05:29), Max: 98.5 (12 Sep 2023 20:03)  HR: 85 (13 Sep 2023 05:29) (83 - 91)  BP: 125/66 (13 Sep 2023 05:29) (125/66 - 141/76)  BP(mean): 97 (12 Sep 2023 16:33) (97 - 97)  RR: 18 (13 Sep 2023 05:29) (18 - 18)  SpO2: 100% (13 Sep 2023 05:29) (97% - 100%)    O2 Parameters below as of 13 Sep 2023 05:29  Patient On (Oxygen Delivery Method): room air              09-12 @ 07:01 - 09-13 @ 07:00  --------------------------------------------------------  IN: 480 mL / OUT: 1800 mL / NET: -1320 mL    09-13 @ 07:01 - 09-13 @ 11:54  --------------------------------------------------------  IN: 10 mL / OUT: 700 mL / NET: -690 mL      CAPILLARY BLOOD GLUCOSE      POCT Blood Glucose.: 191 mg/dL (13 Sep 2023 08:29)      PHYSICAL EXAM:  eneral: Awake, alert, oriented X 3.   Respiratory: Normal chest expansion  Diffuse rhonchi and course breath sounds throughout   NO appreciable wheeze  Prominent wet cough  Cardiovascular: S1 S2 normal. No murmurs, rubs or gallops.   Abdomen: Soft, non-tender, non-distended. No organomegaly.  Extremities: Warm to touch. Peripheral pulse palpable. No pedal edema.   Skin: rash on right abdomen        HOSPITAL MEDICATIONS:  MEDICATIONS  (STANDING):  albuterol/ipratropium for Nebulization 3 milliLiter(s) Nebulizer every 6 hours  budesonide  80 MICROgram(s)/formoterol 4.5 MICROgram(s) Inhaler 2 Puff(s) Inhalation two times a day  guaiFENesin  milliGRAM(s) Oral every 12 hours  methylPREDNISolone 16 milliGRAM(s) Oral daily  tiotropium 2.5 MICROgram(s) Inhaler 2 Puff(s) Inhalation daily  warfarin 2.5 milliGRAM(s) Oral once        LABS:                        10.0   9.69  )-----------( 234      ( 13 Sep 2023 06:39 )             35.1     Hgb Trend: 10.0<--, 10.4<--, 10.8<--, 9.0<--, 8.5<--  09-13    140  |  101  |  16  ----------------------------<  193<H>  4.5   |  26  |  0.71    Ca    8.9      13 Sep 2023 06:41    TPro  6.4  /  Alb  4.1  /  TBili  0.4  /  DBili  x   /  AST  13  /  ALT  15  /  AlkPhos  83  09-12    Creatinine Trend: 0.71<--, 0.65<--, 0.69<--, 0.62<--, 0.63<--, 0.58<--  PT/INR - ( 13 Sep 2023 06:39 )   PT: 15.8 sec;   INR: 1.52 ratio           Urinalysis Basic - ( 13 Sep 2023 06:41 )    Color: x / Appearance: x / SG: x / pH: x  Gluc: 193 mg/dL / Ketone: x  / Bili: x / Urobili: x   Blood: x / Protein: x / Nitrite: x   Leuk Esterase: x / RBC: x / WBC x   Sq Epi: x / Non Sq Epi: x / Bacteria: x          MICROBIOLOGY:     MICROBIOLOGY:   9/3/23: Moderate Proteus Mirabilis ESBL  8/18/23: MRSA  8/17/23: MRSA  6/9/23: Proteus Mirabilis ESBL  5/13/23: Proteus Mirabilis ESBL  10/4/22: MRSA  9/8/22:  Proteus Mirabilis ESBL  6/9/22:  Proteus Mirabilis ESBL  4/26/22: MRSA  3/29/22: MRSA    AFB Smears:   8/14/23: AFB negative  10/3/21: AFB negative    RADIOLOGY:  [x] Reviewed and interpreted by me  CXR 9/13/22: Prominent vascular markings, mild tram tracking towards the bases, flattened diaphragms  CT Chest 8/11/23: Multifocal tree in bud, bronchiectasis at bases

## 2023-09-14 DIAGNOSIS — E78.5 HYPERLIPIDEMIA, UNSPECIFIED: ICD-10-CM

## 2023-09-14 LAB
-  AMIKACIN: SIGNIFICANT CHANGE UP
-  AMOXICILLIN/CLAVULANIC ACID: SIGNIFICANT CHANGE UP
-  AMPICILLIN/SULBACTAM: SIGNIFICANT CHANGE UP
-  AMPICILLIN: SIGNIFICANT CHANGE UP
-  AZTREONAM: SIGNIFICANT CHANGE UP
-  CEFAZOLIN: SIGNIFICANT CHANGE UP
-  CEFEPIME: SIGNIFICANT CHANGE UP
-  CEFTRIAXONE: SIGNIFICANT CHANGE UP
-  CIPROFLOXACIN: SIGNIFICANT CHANGE UP
-  ERTAPENEM: SIGNIFICANT CHANGE UP
-  GENTAMICIN: SIGNIFICANT CHANGE UP
-  LEVOFLOXACIN: SIGNIFICANT CHANGE UP
-  MEROPENEM: SIGNIFICANT CHANGE UP
-  PIPERACILLIN/TAZOBACTAM: SIGNIFICANT CHANGE UP
-  TOBRAMYCIN: SIGNIFICANT CHANGE UP
-  TRIMETHOPRIM/SULFAMETHOXAZOLE: SIGNIFICANT CHANGE UP
ALBUMIN SERPL ELPH-MCNC: 3.7 G/DL — SIGNIFICANT CHANGE UP (ref 3.3–5)
ALP SERPL-CCNC: 71 U/L — SIGNIFICANT CHANGE UP (ref 40–120)
ALT FLD-CCNC: 15 U/L — SIGNIFICANT CHANGE UP (ref 10–45)
ANION GAP SERPL CALC-SCNC: 14 MMOL/L — SIGNIFICANT CHANGE UP (ref 5–17)
AST SERPL-CCNC: 13 U/L — SIGNIFICANT CHANGE UP (ref 10–40)
BASOPHILS # BLD AUTO: 0 K/UL — SIGNIFICANT CHANGE UP (ref 0–0.2)
BASOPHILS NFR BLD AUTO: 0 % — SIGNIFICANT CHANGE UP (ref 0–2)
BILIRUB SERPL-MCNC: 0.3 MG/DL — SIGNIFICANT CHANGE UP (ref 0.2–1.2)
BUN SERPL-MCNC: 14 MG/DL — SIGNIFICANT CHANGE UP (ref 7–23)
CALCIUM SERPL-MCNC: 8.6 MG/DL — SIGNIFICANT CHANGE UP (ref 8.4–10.5)
CHLORIDE SERPL-SCNC: 104 MMOL/L — SIGNIFICANT CHANGE UP (ref 96–108)
CO2 SERPL-SCNC: 21 MMOL/L — LOW (ref 22–31)
CREAT SERPL-MCNC: 0.64 MG/DL — SIGNIFICANT CHANGE UP (ref 0.5–1.3)
CULTURE RESULTS: SIGNIFICANT CHANGE UP
EGFR: 93 ML/MIN/1.73M2 — SIGNIFICANT CHANGE UP
EOSINOPHIL # BLD AUTO: 0.11 K/UL — SIGNIFICANT CHANGE UP (ref 0–0.5)
EOSINOPHIL NFR BLD AUTO: 0.9 % — SIGNIFICANT CHANGE UP (ref 0–6)
GLUCOSE BLDC GLUCOMTR-MCNC: 129 MG/DL — HIGH (ref 70–99)
GLUCOSE BLDC GLUCOMTR-MCNC: 176 MG/DL — HIGH (ref 70–99)
GLUCOSE BLDC GLUCOMTR-MCNC: 183 MG/DL — HIGH (ref 70–99)
GLUCOSE BLDC GLUCOMTR-MCNC: 74 MG/DL — SIGNIFICANT CHANGE UP (ref 70–99)
GLUCOSE BLDC GLUCOMTR-MCNC: 91 MG/DL — SIGNIFICANT CHANGE UP (ref 70–99)
GLUCOSE SERPL-MCNC: 194 MG/DL — HIGH (ref 70–99)
HCT VFR BLD CALC: 34.8 % — SIGNIFICANT CHANGE UP (ref 34.5–45)
HGB BLD-MCNC: 10 G/DL — LOW (ref 11.5–15.5)
INR BLD: 1.54 RATIO — HIGH (ref 0.85–1.18)
LEGIONELLA AG UR QL: NEGATIVE — SIGNIFICANT CHANGE UP
LYMPHOCYTES # BLD AUTO: 0.75 K/UL — LOW (ref 1–3.3)
LYMPHOCYTES # BLD AUTO: 6.1 % — LOW (ref 13–44)
MCHC RBC-ENTMCNC: 23.5 PG — LOW (ref 27–34)
MCHC RBC-ENTMCNC: 28.7 GM/DL — LOW (ref 32–36)
MCV RBC AUTO: 81.7 FL — SIGNIFICANT CHANGE UP (ref 80–100)
METHOD TYPE: SIGNIFICANT CHANGE UP
MONOCYTES # BLD AUTO: 0.32 K/UL — SIGNIFICANT CHANGE UP (ref 0–0.9)
MONOCYTES NFR BLD AUTO: 2.6 % — SIGNIFICANT CHANGE UP (ref 2–14)
NEUTROPHILS # BLD AUTO: 11.09 K/UL — HIGH (ref 1.8–7.4)
NEUTROPHILS NFR BLD AUTO: 90.4 % — HIGH (ref 43–77)
NIGHT BLUE STAIN TISS: SIGNIFICANT CHANGE UP
ORGANISM # SPEC MICROSCOPIC CNT: SIGNIFICANT CHANGE UP
ORGANISM # SPEC MICROSCOPIC CNT: SIGNIFICANT CHANGE UP
PLATELET # BLD AUTO: 245 K/UL — SIGNIFICANT CHANGE UP (ref 150–400)
POTASSIUM SERPL-MCNC: 4.5 MMOL/L — SIGNIFICANT CHANGE UP (ref 3.5–5.3)
POTASSIUM SERPL-SCNC: 4.5 MMOL/L — SIGNIFICANT CHANGE UP (ref 3.5–5.3)
PROT SERPL-MCNC: 5.9 G/DL — LOW (ref 6–8.3)
PROTHROM AB SERPL-ACNC: 16 SEC — HIGH (ref 9.5–13)
RBC # BLD: 4.26 M/UL — SIGNIFICANT CHANGE UP (ref 3.8–5.2)
RBC # FLD: 26.1 % — HIGH (ref 10.3–14.5)
S PNEUM AG UR QL: NEGATIVE — SIGNIFICANT CHANGE UP
SODIUM SERPL-SCNC: 139 MMOL/L — SIGNIFICANT CHANGE UP (ref 135–145)
SPECIMEN SOURCE: SIGNIFICANT CHANGE UP
SPECIMEN SOURCE: SIGNIFICANT CHANGE UP
WBC # BLD: 12.27 K/UL — HIGH (ref 3.8–10.5)
WBC # FLD AUTO: 12.27 K/UL — HIGH (ref 3.8–10.5)

## 2023-09-14 PROCEDURE — 99231 SBSQ HOSP IP/OBS SF/LOW 25: CPT | Mod: FS

## 2023-09-14 PROCEDURE — 99222 1ST HOSP IP/OBS MODERATE 55: CPT

## 2023-09-14 PROCEDURE — 99232 SBSQ HOSP IP/OBS MODERATE 35: CPT

## 2023-09-14 RX ORDER — TRAMADOL HYDROCHLORIDE 50 MG/1
50 TABLET ORAL ONCE
Refills: 0 | Status: DISCONTINUED | OUTPATIENT
Start: 2023-09-14 | End: 2023-09-14

## 2023-09-14 RX ORDER — INSULIN LISPRO 100/ML
15 VIAL (ML) SUBCUTANEOUS
Refills: 0 | Status: DISCONTINUED | OUTPATIENT
Start: 2023-09-15 | End: 2023-09-15

## 2023-09-14 RX ORDER — WARFARIN SODIUM 2.5 MG/1
2.5 TABLET ORAL ONCE
Refills: 0 | Status: COMPLETED | OUTPATIENT
Start: 2023-09-14 | End: 2023-09-14

## 2023-09-14 RX ORDER — GABAPENTIN 400 MG/1
100 CAPSULE ORAL EVERY 8 HOURS
Refills: 0 | Status: DISCONTINUED | OUTPATIENT
Start: 2023-09-14 | End: 2023-10-01

## 2023-09-14 RX ORDER — INSULIN LISPRO 100/ML
9 VIAL (ML) SUBCUTANEOUS ONCE
Refills: 0 | Status: COMPLETED | OUTPATIENT
Start: 2023-09-14 | End: 2023-09-14

## 2023-09-14 RX ORDER — ACETAMINOPHEN 500 MG
1000 TABLET ORAL ONCE
Refills: 0 | Status: COMPLETED | OUTPATIENT
Start: 2023-09-14 | End: 2023-09-14

## 2023-09-14 RX ORDER — OXYCODONE HYDROCHLORIDE 5 MG/1
5 TABLET ORAL EVERY 6 HOURS
Refills: 0 | Status: DISCONTINUED | OUTPATIENT
Start: 2023-09-14 | End: 2023-09-15

## 2023-09-14 RX ORDER — INSULIN LISPRO 100/ML
VIAL (ML) SUBCUTANEOUS
Refills: 0 | Status: DISCONTINUED | OUTPATIENT
Start: 2023-09-14 | End: 2023-10-01

## 2023-09-14 RX ORDER — VALACYCLOVIR 500 MG/1
1000 TABLET, FILM COATED ORAL THREE TIMES A DAY
Refills: 0 | Status: COMPLETED | OUTPATIENT
Start: 2023-09-14 | End: 2023-09-21

## 2023-09-14 RX ORDER — TRAMADOL HYDROCHLORIDE 50 MG/1
25 TABLET ORAL ONCE
Refills: 0 | Status: DISCONTINUED | OUTPATIENT
Start: 2023-09-14 | End: 2023-09-14

## 2023-09-14 RX ADMIN — Medication 1 APPLICATION(S): at 21:39

## 2023-09-14 RX ADMIN — TRAMADOL HYDROCHLORIDE 50 MILLIGRAM(S): 50 TABLET ORAL at 23:41

## 2023-09-14 RX ADMIN — FAMOTIDINE 20 MILLIGRAM(S): 10 INJECTION INTRAVENOUS at 19:03

## 2023-09-14 RX ADMIN — Medication 2: at 08:48

## 2023-09-14 RX ADMIN — BUDESONIDE AND FORMOTEROL FUMARATE DIHYDRATE 2 PUFF(S): 160; 4.5 AEROSOL RESPIRATORY (INHALATION) at 19:03

## 2023-09-14 RX ADMIN — Medication 600 MILLIGRAM(S): at 05:43

## 2023-09-14 RX ADMIN — BUDESONIDE AND FORMOTEROL FUMARATE DIHYDRATE 2 PUFF(S): 160; 4.5 AEROSOL RESPIRATORY (INHALATION) at 05:43

## 2023-09-14 RX ADMIN — VALACYCLOVIR 1000 MILLIGRAM(S): 500 TABLET, FILM COATED ORAL at 14:56

## 2023-09-14 RX ADMIN — GABAPENTIN 100 MILLIGRAM(S): 400 CAPSULE ORAL at 21:39

## 2023-09-14 RX ADMIN — CLOPIDOGREL BISULFATE 75 MILLIGRAM(S): 75 TABLET, FILM COATED ORAL at 11:27

## 2023-09-14 RX ADMIN — Medication 3 MILLILITER(S): at 11:26

## 2023-09-14 RX ADMIN — GABAPENTIN 100 MILLIGRAM(S): 400 CAPSULE ORAL at 14:15

## 2023-09-14 RX ADMIN — Medication 1000 MILLIGRAM(S): at 16:03

## 2023-09-14 RX ADMIN — ATORVASTATIN CALCIUM 20 MILLIGRAM(S): 80 TABLET, FILM COATED ORAL at 21:39

## 2023-09-14 RX ADMIN — MEXILETINE HYDROCHLORIDE 200 MILLIGRAM(S): 150 CAPSULE ORAL at 14:15

## 2023-09-14 RX ADMIN — WARFARIN SODIUM 2.5 MILLIGRAM(S): 2.5 TABLET ORAL at 21:39

## 2023-09-14 RX ADMIN — FAMOTIDINE 20 MILLIGRAM(S): 10 INJECTION INTRAVENOUS at 05:43

## 2023-09-14 RX ADMIN — Medication 3 MILLILITER(S): at 00:05

## 2023-09-14 RX ADMIN — TRAMADOL HYDROCHLORIDE 25 MILLIGRAM(S): 50 TABLET ORAL at 19:35

## 2023-09-14 RX ADMIN — SIMETHICONE 80 MILLIGRAM(S): 80 TABLET, CHEWABLE ORAL at 00:25

## 2023-09-14 RX ADMIN — VALACYCLOVIR 1000 MILLIGRAM(S): 500 TABLET, FILM COATED ORAL at 21:38

## 2023-09-14 RX ADMIN — Medication 400 MILLIGRAM(S): at 14:56

## 2023-09-14 RX ADMIN — Medication 1 APPLICATION(S): at 05:43

## 2023-09-14 RX ADMIN — Medication 1000 MILLIGRAM(S): at 10:45

## 2023-09-14 RX ADMIN — SIMETHICONE 80 MILLIGRAM(S): 80 TABLET, CHEWABLE ORAL at 23:40

## 2023-09-14 RX ADMIN — GABAPENTIN 100 MILLIGRAM(S): 400 CAPSULE ORAL at 09:57

## 2023-09-14 RX ADMIN — Medication 600 MILLIGRAM(S): at 19:02

## 2023-09-14 RX ADMIN — Medication 9 UNIT(S): at 20:17

## 2023-09-14 RX ADMIN — Medication 3 MILLILITER(S): at 19:03

## 2023-09-14 RX ADMIN — MEXILETINE HYDROCHLORIDE 200 MILLIGRAM(S): 150 CAPSULE ORAL at 05:42

## 2023-09-14 RX ADMIN — MEXILETINE HYDROCHLORIDE 200 MILLIGRAM(S): 150 CAPSULE ORAL at 21:38

## 2023-09-14 RX ADMIN — TIOTROPIUM BROMIDE 2 PUFF(S): 18 CAPSULE ORAL; RESPIRATORY (INHALATION) at 11:27

## 2023-09-14 RX ADMIN — INSULIN GLARGINE 35 UNIT(S): 100 INJECTION, SOLUTION SUBCUTANEOUS at 21:04

## 2023-09-14 RX ADMIN — Medication 16 MILLIGRAM(S): at 05:42

## 2023-09-14 RX ADMIN — POLYETHYLENE GLYCOL 3350 17 GRAM(S): 17 POWDER, FOR SOLUTION ORAL at 05:44

## 2023-09-14 RX ADMIN — SENNA PLUS 2 TABLET(S): 8.6 TABLET ORAL at 21:39

## 2023-09-14 RX ADMIN — POLYETHYLENE GLYCOL 3350 17 GRAM(S): 17 POWDER, FOR SOLUTION ORAL at 19:02

## 2023-09-14 RX ADMIN — Medication 18 UNIT(S): at 08:48

## 2023-09-14 RX ADMIN — Medication 400 MILLIGRAM(S): at 10:19

## 2023-09-14 RX ADMIN — POLYETHYLENE GLYCOL 3350 17 GRAM(S): 17 POWDER, FOR SOLUTION ORAL at 11:25

## 2023-09-14 NOTE — PROGRESS NOTE ADULT - ASSESSMENT
72 year old on chronic steroids for for COPD, and adrenal insufficiency asthma, TIA, bronchiectasias, TIA, recurrent VZV. Recently had a TAVR and was readmitted for sob and copd   Being treated by pulmonary and yesterday note  to have a rash on her right upper leg  Classic VZV rash localized.  no signs of dissemination   Likely related to her steroids  Can they be tapered ?  start valcyte 1 gram tic for 7 days

## 2023-09-14 NOTE — CONSULT NOTE ADULT - ASSESSMENT
72 y.o. F with PMH of asthma on chronic steroids, bronchiectasis, allergic rhinitis,  recurrent PNA,  tracheomalacia s/p tracheoplasty, ESBL proteus infections (sputum),  diabetes, paroxysmal A-fib on coumadin, colorectal cancer many years ago status post colostomy, recent hospitalization at an outside hospital  for acute respiratory failure with hypoxia secondary to asthma/COPD exacerbation and bronchopneumonia 6/5/2023 - 6/16/2023), and AVR 2022 with Dr. Cabrera.  Pt admits to chronic SOB and cough for years and is up to date on vaccines. Dr. Allison follows as an outpatient for her COPD. s/p 9/6 TAVR- [facility] with Dr. Gonsalves and Dr. Leahy. discharged home 9/10. Pt presents to office today for f/u visit  w/ c/o of worsening SOB. Pt to be admitted for further workup and eval. Pt stable at this time. VSS; O2 sats 97% RA.  ,H/o  ,bronchiectasis, tracheomalacia s/p tracheoplasty who presents after a recent admission for bronchiectasis exacerbation (6/2023, treated with Ertapenem for ESBL proteus), and again in 9/2023 for an acute flare whose hospital course was complicated by identification of severe AS requiring valve in valve TAVR 9/7/23 9/13 Pulm eval /rec Routine sputum culture, urine strep/legionella, RVP, COVID  3 sputum AFB   Considering culture history would start ertapenem and vancomycin for MRSA   Bronchodilator: Standing duonebs q 4 hours, nebulized pulmicort  - Continue steroids at 16 mg for now  - Ensure she is getting twice per day airway clearance: Albuterol nebulizer followed by saline nebulizer followed by cough assist device and encouragement to cough and clear  - Pulmonary will continue to follow  - Would consult dermatology for her rash    F/u  cultures, ambulation.  Coumadin, h/o paf  D/c planning when stable    S/P TAVR (transcatheter aortic valve replacement).   ·  Plan: Structural Team Following   Continue with Plavix 75 mg PO daily   Continue with Mexiletine 200 mg every 8 hours   Continue with Atorvastatin 20 mg PO HS    Daily EKG   Increase activity as tolerated.   Encourage Chest PT / Pulmonary toileting and Incentive spirometry every 1 hour x 10 while awake.   Continue with PUD and DVT prophylaxis.   Daily Shower     shingles  - start valcyte 1 gram tic for 7 days      Uncontrolled type 2 diabetes mellitus with hyperglycemia.   ·  Plan: Test BG ac and hs and 2am if eating. Q6H while NPO  -restart Lantus 32 units sq hs   - restart  Admelog 15-16-18 units qac   Hold if NPO or eating less than 50% of meals.  -restart  moderate correction scale ac meals and moderate correction hs and 2am.      Plan to d/c on basal bolus.   Outpt. endo follow-up. Dr Saavedra  Outpt. optho, podiatry, micro/cr  Consider Freesytle Luis E 3  upon discharge.    Essential hypertension.   ·  Plan: Goal BP <130/80   Manage per primary team.     Hyperlipidemia.   ·  Plan: LDL goal <70 due to DM  Pt LDL N/A  Order fasting lipid if not recently done  Statin as noted above   Manage per primary team   F/u levels as out pt.    H/O adrenal insufficiency.   ·  Plan: On chronic steroids at home > medrol 8mg qd from admission in 2022  Per pt she has been sick this year with multiple hospitalizations requiring pulse steroids. Was on Methylprednisolone 28mg PTA.   -Pt on slow titration back to Medrol 8mg qd  -c/w MTP 16mg   - Will need stress steroids if acutely ill or for OR. No need for stress steroid dose at this time since pt received Prednisone 50mg x3 prior to OR now on medrol 16 qd.    Atrial fibrillation.   ·  Plan: Continue with Mexiletine 200 mg every 8 hours   Coumadin 5 mg PO tonight for INR 1.46  Daily PT/ INR.

## 2023-09-14 NOTE — PROGRESS NOTE ADULT - SUBJECTIVE AND OBJECTIVE BOX
Interval Events:   Exam c/w shingles  Transferred to medicine  Reports persistent cough and dyspnea    REVIEW OF SYSTEMS:  Denies fevers, chills, chest pain, palpitations cough, shortness of breath  Negative unless stated above    OBJECTIVE:  ICU Vital Signs Last 24 Hrs  T(C): 36.4 (14 Sep 2023 13:14), Max: 37.1 (13 Sep 2023 20:27)  T(F): 97.5 (14 Sep 2023 13:14), Max: 98.8 (13 Sep 2023 20:27)  HR: 84 (14 Sep 2023 13:14) (84 - 98)  BP: 137/77 (14 Sep 2023 13:14) (119/53 - 154/81)  BP(mean): --  ABP: --  ABP(mean): --  RR: 19 (14 Sep 2023 13:14) (18 - 19)  SpO2: 97% (14 Sep 2023 13:14) (95% - 98%)    O2 Parameters below as of 14 Sep 2023 13:14  Patient On (Oxygen Delivery Method): room air        09-13 @ 07:01 - 09-14 @ 07:00  --------------------------------------------------------  IN: 1170 mL / OUT: 3050 mL / NET: -1880 mL    09-14 @ 07:01 - 09-14 @ 13:32  --------------------------------------------------------  IN: 310 mL / OUT: 250 mL / NET: 60 mL    PHYSICAL EXAM:  General: Awake, alert, oriented X 3.   Respiratory: Normal chest expansion  Diffuse rhonchi and course breath sounds throughout   NO appreciable wheeze  Prominent wet cough  Cardiovascular: S1 S2 normal. No murmurs, rubs or gallops.   Abdomen: Soft, non-tender, non-distended. No organomegaly.  Extremities: Warm to touch. Peripheral pulse palpable. No pedal edema.   Skin: rash on right abdomen      HOSPITAL MEDICATIONS:  MEDICATIONS  (STANDING):  albuterol/ipratropium for Nebulization 3 milliLiter(s) Nebulizer every 6 hours  atorvastatin 20 milliGRAM(s) Oral at bedtime  budesonide  80 MICROgram(s)/formoterol 4.5 MICROgram(s) Inhaler 2 Puff(s) Inhalation two times a day  clopidogrel Tablet 75 milliGRAM(s) Oral daily  famotidine    Tablet 20 milliGRAM(s) Oral two times a day  gabapentin 100 milliGRAM(s) Oral every 8 hours  guaiFENesin  milliGRAM(s) Oral every 12 hours  hydrocortisone 1% Cream 1 Application(s) Topical two times a day  insulin glargine Injectable (LANTUS) 35 Unit(s) SubCutaneous at bedtime  insulin lispro (ADMELOG) corrective regimen sliding scale   SubCutaneous three times a day before meals  insulin lispro (ADMELOG) corrective regimen sliding scale   SubCutaneous at bedtime  insulin lispro Injectable (ADMELOG) 18 Unit(s) SubCutaneous before dinner  insulin lispro Injectable (ADMELOG) 18 Unit(s) SubCutaneous before breakfast  insulin lispro Injectable (ADMELOG) 18 Unit(s) SubCutaneous before lunch  methylPREDNISolone 16 milliGRAM(s) Oral daily  mexiletine 200 milliGRAM(s) Oral every 8 hours  oxyCODONE    IR 5 milliGRAM(s) Oral every 6 hours  polyethylene glycol 3350 17 Gram(s) Oral daily  polyethylene glycol 3350 17 Gram(s) Oral two times a day  senna 2 Tablet(s) Oral at bedtime  tiotropium 2.5 MICROgram(s) Inhaler 2 Puff(s) Inhalation daily  valACYclovir 1000 milliGRAM(s) Oral three times a day    MEDICATIONS  (PRN):  acetaminophen     Tablet .. 650 milliGRAM(s) Oral every 6 hours PRN Temp greater or equal to 38C (100.4F), Mild Pain (1 - 3)  simethicone 80 milliGRAM(s) Chew every 12 hours PRN Gas      LABS:                        10.0   12.27 )-----------( 245      ( 14 Sep 2023 09:20 )             34.8     Hgb Trend: 10.0<--, 10.0<--, 10.4<--, 10.8<--, 9.0<--  09-14    139  |  104  |  14  ----------------------------<  194<H>  4.5   |  21<L>  |  0.64    Ca    8.6      14 Sep 2023 09:20    TPro  5.9<L>  /  Alb  3.7  /  TBili  0.3  /  DBili  x   /  AST  13  /  ALT  15  /  AlkPhos  71  09-14    Creatinine Trend: 0.64<--, 0.71<--, 0.65<--, 0.69<--, 0.62<--, 0.63<--  PT/INR - ( 14 Sep 2023 09:20 )   PT: 16.0 sec;   INR: 1.54 ratio           Urinalysis Basic - ( 14 Sep 2023 09:20 )    Color: x / Appearance: x / SG: x / pH: x  Gluc: 194 mg/dL / Ketone: x  / Bili: x / Urobili: x   Blood: x / Protein: x / Nitrite: x   Leuk Esterase: x / RBC: x / WBC x   Sq Epi: x / Non Sq Epi: x / Bacteria: x        MICROBIOLOGY:   9/3/23: Moderate Proteus Mirabilis ESBL  8/18/23: MRSA  8/17/23: MRSA  6/9/23: Proteus Mirabilis ESBL  5/13/23: Proteus Mirabilis ESBL  10/4/22: MRSA  9/8/22:  Proteus Mirabilis ESBL  6/9/22:  Proteus Mirabilis ESBL  4/26/22: MRSA  3/29/22: MRSA    AFB Smears:   8/14/23: AFB negative  10/3/21: AFB negative

## 2023-09-14 NOTE — PROGRESS NOTE ADULT - SUBJECTIVE AND OBJECTIVE BOX
Seen earlier today     Chief Complaint: Diabetes Mellitus follow up    INTERVAL HX: Tolerating POs with good appetite. BGs variable > BG 79 yesterday at lunch because she did not eat breakfast after receiving insulin dose, hyperglycemia at dinner time ( ) after holding premeal insulin while having lunch. BGs improved today. Methylprednisolone 16 mg daily        Review of Systems:  General: As above  GI: No nausea, vomiting  Endocrine: no  S&Sx of hypoglycemia    Allergies    tetanus toxoid (Short breath)  Dilaudid (Short breath)  codeine (Short breath)  iodine (Short breath; Swelling)  Avelox (Short breath; Pruritus)  shellfish (Anaphylaxis)  cefepime (Anaphylaxis)  aspirin (Short breath)  Valium (Short breath)  penicillin (Anaphylaxis)    Intolerances      MEDICATIONS  (STANDING):  atorvastatin 20 milliGRAM(s) Oral at bedtime  gabapentin 100 milliGRAM(s) Oral every 8 hours  insulin glargine Injectable (LANTUS) 35 Unit(s) SubCutaneous at bedtime  insulin lispro (ADMELOG) corrective regimen sliding scale   SubCutaneous three times a day before meals  insulin lispro (ADMELOG) corrective regimen sliding scale   SubCutaneous at bedtime  insulin lispro Injectable (ADMELOG) 18 Unit(s) SubCutaneous before dinner  insulin lispro Injectable (ADMELOG) 18 Unit(s) SubCutaneous before breakfast  insulin lispro Injectable (ADMELOG) 18 Unit(s) SubCutaneous before lunch  methylPREDNISolone 16 milliGRAM(s) Oral daily  mexiletine 200 milliGRAM(s) Oral every 8 hours  oxyCODONE    IR 5 milliGRAM(s) Oral every 6 hours  polyethylene glycol 3350 17 Gram(s) Oral daily  polyethylene glycol 3350 17 Gram(s) Oral two times a day  senna 2 Tablet(s) Oral at bedtime  tiotropium 2.5 MICROgram(s) Inhaler 2 Puff(s) Inhalation daily  valACYclovir 1000 milliGRAM(s) Oral three times a day  warfarin 2.5 milliGRAM(s) Oral once      atorvastatin   20 milliGRAM(s) Oral (09-13-23 @ 22:04)    insulin glargine Injectable (LANTUS)   35 Unit(s) SubCutaneous (09-13-23 @ 22:03)    insulin lispro (ADMELOG) corrective regimen sliding scale   2 Unit(s) SubCutaneous (09-14-23 @ 08:48)    insulin lispro Injectable (ADMELOG)   18 Unit(s) SubCutaneous (09-14-23 @ 12:55)    insulin lispro Injectable (ADMELOG)   18 Unit(s) SubCutaneous (09-14-23 @ 08:48)    methylPREDNISolone   16 milliGRAM(s) Oral (09-14-23 @ 05:42)        PHYSICAL EXAM:  VITALS: T(C): 36.4 (09-14-23 @ 13:14)  T(F): 97.5 (09-14-23 @ 13:14), Max: 98.8 (09-13-23 @ 20:27)  HR: 84 (09-14-23 @ 13:14) (84 - 98)  BP: 137/77 (09-14-23 @ 13:14) (119/53 - 154/81)  RR:  (18 - 19)  SpO2:  (95% - 98%)  Wt(kg): --  GENERAL: female laying in bed, in NAD  Respiratory: Respirations unlabored   Extremities: Warm, no edema  NEURO: Alert , appropriate     LABS:  POCT Blood Glucose.: 129 mg/dL (09-14-23 @ 12:24)  POCT Blood Glucose.: 74 mg/dL (09-14-23 @ 12:01)  POCT Blood Glucose.: 176 mg/dL (09-14-23 @ 08:38)  POCT Blood Glucose.: 123 mg/dL (09-13-23 @ 21:34)  POCT Blood Glucose.: 304 mg/dL (09-13-23 @ 16:33)  POCT Blood Glucose.: 79 mg/dL (09-13-23 @ 12:33)  POCT Blood Glucose.: 191 mg/dL (09-13-23 @ 08:29)  POCT Blood Glucose.: 237 mg/dL (09-13-23 @ 02:04)  POCT Blood Glucose.: 180 mg/dL (09-12-23 @ 20:56)  POCT Blood Glucose.: 364 mg/dL (09-12-23 @ 18:23)  POCT Blood Glucose.: 328 mg/dL (09-12-23 @ 14:46)                          10.0   12.27 )-----------( 245      ( 14 Sep 2023 09:20 )             34.8     09-14    139  |  104  |  14  ----------------------------<  194<H>  4.5   |  21<L>  |  0.64    Ca    8.6      14 Sep 2023 09:20    TPro  5.9<L>  /  Alb  3.7  /  TBili  0.3  /  DBili  x   /  AST  13  /  ALT  15  /  AlkPhos  71  09-14    eGFR: 93 mL/min/1.73m2 (14 Sep 2023 09:20)      Thyroid Function Tests:  09-06 @ 09:32 TSH 0.49 FreeT4 1.2 T3 82 Anti TPO -- Anti Thyroglobulin Ab -- TSI --      A1C with Estimated Average Glucose Result: 5.7 % (09-08-23 @ 06:39)  A1C with Estimated Average Glucose Result: 9.1 % (06-17-23 @ 10:27)    Estimated Average Glucose: 117 mg/dL (09-08-23 @ 06:39)  Estimated Average Glucose: 214 mg/dL (06-17-23 @ 10:27)        Diet, Regular:   Consistent Carbohydrate No Snacks (CSTCHO)  Supplement Feeding Modality:  Oral  Glucerna Shake Cans or Servings Per Day:  3       Frequency:  Daily (09-12-23 @ 12:02) [Active]

## 2023-09-14 NOTE — CONSULT NOTE ADULT - SUBJECTIVE AND OBJECTIVE BOX
72F with with PMHF of asthma on chronic steroids, bronchiectasis, allergic rhinitis,  recurrent PNA,  tracheomalacia s/p tracheoplasty, ESBL proteus infections (sputum),  diabetes, paroxysmal A-fib on coumadin, colorectal cancer many years ago status post colostomy, recent hospitalization at an outside hospital  for acute respiratory failure with hypoxia secondary to asthma/COPD exacerbation and bronchopneumonia 6/5/2023 - 6/16/2023), and AVR 2022 with Dr. Cabrera.  Pt admits to chronic SOB and cough for years and is up to date on vaccines. Dr. Allison follows as an outpatient for her COPD. s/p 9/6 TAVR- [facility] with Dr. Gonsalves and Dr. Leahy. discharged home 9/10. Pt presents to office today for f/u visit  w/ c/o of worsening SOB. Pt to be admitted for further workup and eval. Pt stable at this time. VSS; O2 sats 97% RA.           (12 Sep 2023 12:16)    09-14-23 @ 11:33  PAST MEDICAL & SURGICAL HISTORY:  Atrial fibrillation  paroxysmal, on eliquis      Diabetes  Type 2      COPD (chronic obstructive pulmonary disease)      Adrenal insufficiency  Medrol daily for over 50 years      Aortic insufficiency  moderate AR on echo 5/3/2018      Pelvic fracture      Asthma      Tracheobronchomalacia  diagnosed 2015, s/p bronchial thermoplasty 2016 (Dr Zapien); recent bronchoscopy 6/5/2018 revealed no evidence of tracheobronchomalacia in trachea or bronchial tubes      Colorectal cancer  4/2018- last treatment , chemo and radiation      Rectal bleeding      Seizure  x 1 1/7/18      DVT (deep venous thrombosis)  15-20 years ago, took coumadin      TIA (transient ischemic attack)  multiple, last 5 years ago - presents as right-sided weakness      History of partial hysterectomy  30 years ago - fibroids      H/O total knee replacement, bilateral  5 years ago      History of sinus surgery  multiple sinus surgeries      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      H/O pelvic surgery  5 years ago - s/p fracture      History of tracheomalacia  2015 - attempted tracheal stenting ([facility])- course complicated by obstruction, respiratory failure, multiple CPR attempts -  stent discontinued; 10/20/2016 Tracheobronchoplasty (Prolene Mesh) performed at [facility] by Dr Zapien      S/P bronchoscopy  6/5/2018 - [facility] (Dr Zapien) no evidence of tracheobronchomalacia in trachea or bronchial tubes      Rectal bleeding  exam under anesthesia ([facility]) 2/2018      S/P TAVR (transcatheter aortic valve replacement)      S/P aortic valve replacement          Review of Systems:   CONSTITUTIONAL: No fever, weight loss, or fatigue  EYES: No eye pain, visual disturbances, or discharge  ENMT:  No difficulty hearing, tinnitus, vertigo; No sinus or throat pain  NECK: No pain or stiffness  BREASTS: No pain, masses, or nipple discharge  RESPIRATORY: No cough, wheezing, chills or hemoptysis; No shortness of breath  CARDIOVASCULAR: No chest pain, palpitations, dizziness, or leg swelling  GASTROINTESTINAL: No abdominal or epigastric pain. No nausea, vomiting, or hematemesis; No diarrhea or constipation. No melena or hematochezia.  GENITOURINARY: No dysuria, frequency, hematuria, or incontinence  NEUROLOGICAL: No headaches, memory loss, loss of strength, numbness, or tremors  SKIN: No itching, burning, rashes, or lesions   LYMPH NODES: No enlarged glands  ENDOCRINE: No heat or cold intolerance; No hair loss  MUSCULOSKELETAL: No joint pain or swelling; No muscle, back, or extremity pain  PSYCHIATRIC: No depression, anxiety, mood swings, or difficulty sleeping  HEME/LYMPH: No easy bruising, or bleeding gums  ALLERY AND IMMUNOLOGIC: No hives or eczema    Allergies    tetanus toxoid (Short breath)  Dilaudid (Short breath)  codeine (Short breath)  iodine (Short breath; Swelling)  Avelox (Short breath; Pruritus)  shellfish (Anaphylaxis)  cefepime (Anaphylaxis)  aspirin (Short breath)  Valium (Short breath)  penicillin (Anaphylaxis)    Intolerances        Social History:     FAMILY HISTORY:  Family history of asthma    Family history of breast cancer (Sibling)    Family history of diabetes mellitus type II        MEDICATIONS  (STANDING):  albuterol/ipratropium for Nebulization 3 milliLiter(s) Nebulizer every 6 hours  atorvastatin 20 milliGRAM(s) Oral at bedtime  budesonide  80 MICROgram(s)/formoterol 4.5 MICROgram(s) Inhaler 2 Puff(s) Inhalation two times a day  clopidogrel Tablet 75 milliGRAM(s) Oral daily  famotidine    Tablet 20 milliGRAM(s) Oral two times a day  gabapentin 100 milliGRAM(s) Oral every 8 hours  guaiFENesin  milliGRAM(s) Oral every 12 hours  hydrocortisone 1% Cream 1 Application(s) Topical two times a day  insulin glargine Injectable (LANTUS) 35 Unit(s) SubCutaneous at bedtime  insulin lispro (ADMELOG) corrective regimen sliding scale   SubCutaneous at bedtime  insulin lispro (ADMELOG) corrective regimen sliding scale   SubCutaneous three times a day before meals  insulin lispro Injectable (ADMELOG) 18 Unit(s) SubCutaneous before dinner  insulin lispro Injectable (ADMELOG) 18 Unit(s) SubCutaneous before lunch  insulin lispro Injectable (ADMELOG) 18 Unit(s) SubCutaneous before breakfast  methylPREDNISolone 16 milliGRAM(s) Oral daily  mexiletine 200 milliGRAM(s) Oral every 8 hours  oxyCODONE    IR 5 milliGRAM(s) Oral every 6 hours  polyethylene glycol 3350 17 Gram(s) Oral daily  polyethylene glycol 3350 17 Gram(s) Oral two times a day  senna 2 Tablet(s) Oral at bedtime  tiotropium 2.5 MICROgram(s) Inhaler 2 Puff(s) Inhalation daily  valACYclovir 1000 milliGRAM(s) Oral three times a day    MEDICATIONS  (PRN):  acetaminophen     Tablet .. 650 milliGRAM(s) Oral every 6 hours PRN Temp greater or equal to 38C (100.4F), Mild Pain (1 - 3)  simethicone 80 milliGRAM(s) Chew every 12 hours PRN Gas      Vital Signs Last 24 Hrs  T(C): 36.7 (14 Sep 2023 05:21), Max: 37.1 (13 Sep 2023 20:27)  T(F): 98.1 (14 Sep 2023 05:21), Max: 98.8 (13 Sep 2023 20:27)  HR: 98 (14 Sep 2023 05:21) (81 - 98)  BP: 119/53 (14 Sep 2023 05:21) (109/63 - 154/81)  BP(mean): --  RR: 18 (14 Sep 2023 05:21) (18 - 18)  SpO2: 98% (14 Sep 2023 05:21) (95% - 100%)    Parameters below as of 14 Sep 2023 05:21  Patient On (Oxygen Delivery Method): room air      CAPILLARY BLOOD GLUCOSE      POCT Blood Glucose.: 176 mg/dL (14 Sep 2023 08:38)  POCT Blood Glucose.: 123 mg/dL (13 Sep 2023 21:34)  POCT Blood Glucose.: 304 mg/dL (13 Sep 2023 16:33)  POCT Blood Glucose.: 79 mg/dL (13 Sep 2023 12:33)    I&O's Summary    13 Sep 2023 07:01  -  14 Sep 2023 07:00  --------------------------------------------------------  IN: 1170 mL / OUT: 3050 mL / NET: -1880 mL    14 Sep 2023 07:01  -  14 Sep 2023 11:33  --------------------------------------------------------  IN: 210 mL / OUT: 0 mL / NET: 210 mL        PHYSICAL EXAM:  GENERAL: NAD, well-developed  HEAD:  Atraumatic, Normocephalic  EYES: EOMI, PERRLA, conjunctiva and sclera clear  NECK: Supple, No JVD  CHEST/LUNG: Clear to auscultation bilaterally; No wheeze  HEART: Regular rate and rhythm; No murmurs, rubs, or gallops  ABDOMEN: Soft, Nontender, Nondistended; Bowel sounds present, colostomy  EXTREMITIES:  2+ Peripheral Pulses, No clubbing, cyanosis, or edema  PSYCH: AAOx3  NEUROLOGY: non-focal  SKIN: right groin  and pelvic and flank rash    LABS:                        10.0   12.27 )-----------( 245      ( 14 Sep 2023 09:20 )             34.8     09-14    139  |  104  |  14  ----------------------------<  194<H>  4.5   |  21<L>  |  0.64    Ca    8.6      14 Sep 2023 09:20    TPro  5.9<L>  /  Alb  3.7  /  TBili  0.3  /  DBili  x   /  AST  13  /  ALT  15  /  AlkPhos  71  09-14    PT/INR - ( 14 Sep 2023 09:20 )   PT: 16.0 sec;   INR: 1.54 ratio               Urinalysis Basic - ( 14 Sep 2023 09:20 )    Color: x / Appearance: x / SG: x / pH: x  Gluc: 194 mg/dL / Ketone: x  / Bili: x / Urobili: x   Blood: x / Protein: x / Nitrite: x   Leuk Esterase: x / RBC: x / WBC x   Sq Epi: x / Non Sq Epi: x / Bacteria: x        RADIOLOGY & ADDITIONAL TESTS:    Imaging Personally Reviewed:    Consultant(s) Notes Reviewed:      Care Discussed with Consultants/Other Providers:

## 2023-09-14 NOTE — PROGRESS NOTE ADULT - ASSESSMENT
72 year old female with a complex pulmonary history including severe, life-threatening asthma (on chronic steroids and biologics), ,,bronchiectasis, tracheomalacia s/p tracheoplasty who presents after a recent admission for bronchiectasis exacerbation (6/2023, treated with Ertapenem for ESBL proteus), and again in 9/2023 for an acute flare whose hospital course was complicated by identification of severe AS requiring valve in valve TAVR 9/7/23 discharged 9/10/23 who presents with increasing productive cough, dyspnea. Endocrine consult for management of hyperglycemia in the setting of steroids. Goal -180mg/dL. BGs variable due to insulin and oral intake mismatch. BGs improved today.     Home regimen: Lantus 18 untis at HS and Humalog 15 units ACTID

## 2023-09-14 NOTE — PROGRESS NOTE ADULT - ASSESSMENT
72 year old female with a complex pulmonary history including severe, life-threatening asthma (on chronic steroids and biologics), bronchiectasis, tracheomalacia s/p tracheoplasty who presents after a recent admission for bronchiectasis exacerbation (6/2023, treated with Ertapenem for ESBL proteus), and again in 9/2023 for an acute flare whose hospital course was complicated by identification of severe AS requiring valve in valve TAVR 9/7/23 discharged 9/10/23 who presents with increasing productive cough, dyspnea for which pulmonary is consulted.     Considering her symptoms of increased cough, change in sputum color and quantity, suspect she is exacerbating her bronchiectasis. Would cover her for known pathogens, MRSA and Proteus. Resend sputum for routine culture. With her chronic steroid use, bronchiectasis puts her at risk for DIEUDONNE and her imaging appears consistent and could explain some of her recurrent flares and symptoms. Would start work up and send 3 sputum AFB's. Continue steroids at her current dose, with low threshold to increase.    Recommend:   - Routine sputum culture, urine strep/legionella, RVP, COVID  - 3 sputum AFB   - Considering culture history would start ertapenem and vancomycin for MRSA. ID has been consulted and defer to their expertise.  - Bronchodilator: Standing duonebs q 4 hours, nebulized pulmicort  - Continue steroids at 16 mg for now  - Ensure she is getting twice per day airway clearance: Albuterol nebulizer followed by saline nebulizer followed by cough assist device and encouragement to cough and clear  - Pulmonary will continue to follow

## 2023-09-14 NOTE — PROGRESS NOTE ADULT - SUBJECTIVE AND OBJECTIVE BOX
Patient is a 72y old  Female who presents with a chief complaint of sob (13 Sep 2023 11:53)    HPI:  72F with with PMHF of asthma on chronic steroids, bronchiectasis, allergic rhinitis,  recurrent PNA,  tracheomalacia s/p tracheoplasty, ESBL proteus infections (sputum),  diabetes, paroxysmal A-fib on coumadin, colorectal cancer many years ago status post colostomy, recent hospitalization at an outside hospital  for acute respiratory failure with hypoxia secondary to asthma/COPD exacerbation and bronchopneumonia 6/5/2023 - 6/16/2023), and AVR 2022 with Dr. Cabrera.  Pt admits to chronic SOB and cough for years and is up to date on vaccines. Dr. Allison follows as an outpatient for her COPD. s/p 9/6 TAVR- [facility] with Dr. Gonsalves and Dr. Laehy. discharged home 9/10. Pt presents to office today for f/u visit  w/ c/o of worsening SOB. Pt to be admitted for further workup and eval. Pt stable at this time. VSS; O2 sats 97% RA.           (12 Sep 2023 12:16)      PAST MEDICAL & SURGICAL HISTORY:  Atrial fibrillation  paroxysmal, on eliquis      Diabetes  Type 2      COPD (chronic obstructive pulmonary disease)      Adrenal insufficiency  Medrol daily for over 50 years      Aortic insufficiency  moderate AR on echo 5/3/2018      Pelvic fracture      Asthma      Tracheobronchomalacia  diagnosed 2015, s/p bronchial thermoplasty 2016 (Dr Zapien); recent bronchoscopy 6/5/2018 revealed no evidence of tracheobronchomalacia in trachea or bronchial tubes      Colorectal cancer  4/2018- last treatment , chemo and radiation      Rectal bleeding      Seizure  x 1 1/7/18      DVT (deep venous thrombosis)  15-20 years ago, took coumadin      TIA (transient ischemic attack)  multiple, last 5 years ago - presents as right-sided weakness      History of partial hysterectomy  30 years ago - fibroids      H/O total knee replacement, bilateral  5 years ago      History of sinus surgery  multiple sinus surgeries      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 by Dr Zapien      S/P bronchoscopy  6/5/2018 - [place] (Dr Zapien) no evidence of tracheobronchomalacia in trachea or bronchial tubes      Rectal bleeding  exam under anesthesia ([facility]) 2/2018      S/P TAVR (transcatheter aortic valve replacement)      S/P aortic valve replacement          Social history:    FAMILY HISTORY:  Family history of asthma    Family history of breast cancer (Sibling)    Family history of diabetes mellitus type II           Hematology/Lymphatics:	No LN swelling.No gum bleeding     Endocrine:	No recent weight gain or loss.No abnormal heat/cold intolerance    Allergic/Immunologic:	No hives or rash   Allergies    tetanus toxoid (Short breath)  Dilaudid (Short breath)  codeine (Short breath)  iodine (Short breath; Swelling)  Avelox (Short breath; Pruritus)  shellfish (Anaphylaxis)  cefepime (Anaphylaxis)  aspirin (Short breath)  Valium (Short breath)  penicillin (Anaphylaxis)    Intolerances        Antimicrobials:          Vital Signs Last 24 Hrs  T(C): 36.7 (14 Sep 2023 05:21), Max: 37.1 (13 Sep 2023 20:27)  T(F): 98.1 (14 Sep 2023 05:21), Max: 98.8 (13 Sep 2023 20:27)  HR: 98 (14 Sep 2023 05:21) (81 - 98)  BP: 119/53 (14 Sep 2023 05:21) (109/63 - 154/81)  BP(mean): --  RR: 18 (14 Sep 2023 05:21) (18 - 18)  SpO2: 98% (14 Sep 2023 05:21) (95% - 100%)    Parameters below as of 14 Sep 2023 05:21  Patient On (Oxygen Delivery Method): room air                Eyes:PERRL EOMI.NO discharge or conjunctival injection    ENMT:No sinus tenderness.No thrush.No pharyngeal exudate or erythema.Fair dental hygiene    Neck:Supple,No LN,no JVD      Respiratory:Good air entry bilaterally,CTA    Cardiovascular:S1 S2 wnl, No murmurs,rub or gallops    Gastrointestinal:Soft BS(+) no tenderness no masses ,No rebound or guarding    Genitourinary:No CVA tendereness     Rectal:    Extremities:No cyanosis,clubbing or edema.    Vascular:peripheral pulses felt    Neurological:AAO X 3,No grossly focal deficits    Skin:No rash     Lymph Nodes:No palpable LNs    Musculoskeletal:No joint swelling or LOM    Psychiatric:Affect normal.                                10.0   12.27 )-----------( 245      ( 14 Sep 2023 09:20 )             34.8         09-14    139  |  104  |  14  ----------------------------<  194<H>  4.5   |  21<L>  |  0.64    Ca    8.6      14 Sep 2023 09:20    TPro  5.9<L>  /  Alb  3.7  /  TBili  0.3  /  DBili  x   /  AST  13  /  ALT  15  /  AlkPhos  71  09-14      RECENT CULTURES:  09-12 @ 16:41  .Sputum Sputum  --  --  --    Numerous Proteus mirabilis  --      MICROBIOLOGY:  Culture Results:   Numerous Proteus mirabilis (09-12 @ 16:41)          Radiology:      Assessment:        Recommendations and Plan:    Pager [phone]  After 5 pm/weekends or if no response :[phone]

## 2023-09-15 DIAGNOSIS — E11.65 TYPE 2 DIABETES MELLITUS WITH HYPERGLYCEMIA: ICD-10-CM

## 2023-09-15 DIAGNOSIS — Z86.39 PERSONAL HISTORY OF OTHER ENDOCRINE, NUTRITIONAL AND METABOLIC DISEASE: ICD-10-CM

## 2023-09-15 DIAGNOSIS — I10 ESSENTIAL (PRIMARY) HYPERTENSION: ICD-10-CM

## 2023-09-15 LAB
ANION GAP SERPL CALC-SCNC: 16 MMOL/L — SIGNIFICANT CHANGE UP (ref 5–17)
APTT BLD: 29.6 SEC — SIGNIFICANT CHANGE UP (ref 24.5–35.6)
BUN SERPL-MCNC: 13 MG/DL — SIGNIFICANT CHANGE UP (ref 7–23)
CALCIUM SERPL-MCNC: 9.1 MG/DL — SIGNIFICANT CHANGE UP (ref 8.4–10.5)
CHLORIDE SERPL-SCNC: 105 MMOL/L — SIGNIFICANT CHANGE UP (ref 96–108)
CO2 SERPL-SCNC: 23 MMOL/L — SIGNIFICANT CHANGE UP (ref 22–31)
CREAT SERPL-MCNC: 0.72 MG/DL — SIGNIFICANT CHANGE UP (ref 0.5–1.3)
EGFR: 88 ML/MIN/1.73M2 — SIGNIFICANT CHANGE UP
GLUCOSE BLDC GLUCOMTR-MCNC: 103 MG/DL — HIGH (ref 70–99)
GLUCOSE BLDC GLUCOMTR-MCNC: 139 MG/DL — HIGH (ref 70–99)
GLUCOSE BLDC GLUCOMTR-MCNC: 328 MG/DL — HIGH (ref 70–99)
GLUCOSE BLDC GLUCOMTR-MCNC: 84 MG/DL — SIGNIFICANT CHANGE UP (ref 70–99)
GLUCOSE BLDC GLUCOMTR-MCNC: 86 MG/DL — SIGNIFICANT CHANGE UP (ref 70–99)
GLUCOSE SERPL-MCNC: 48 MG/DL — CRITICAL LOW (ref 70–99)
HCT VFR BLD CALC: 34.5 % — SIGNIFICANT CHANGE UP (ref 34.5–45)
HGB BLD-MCNC: 9.8 G/DL — LOW (ref 11.5–15.5)
INR BLD: 1.22 RATIO — HIGH (ref 0.85–1.18)
MCHC RBC-ENTMCNC: 22.9 PG — LOW (ref 27–34)
MCHC RBC-ENTMCNC: 28.4 GM/DL — LOW (ref 32–36)
MCV RBC AUTO: 80.6 FL — SIGNIFICANT CHANGE UP (ref 80–100)
NRBC # BLD: 0 /100 WBCS — SIGNIFICANT CHANGE UP (ref 0–0)
PLATELET # BLD AUTO: 249 K/UL — SIGNIFICANT CHANGE UP (ref 150–400)
POTASSIUM SERPL-MCNC: 4.3 MMOL/L — SIGNIFICANT CHANGE UP (ref 3.5–5.3)
POTASSIUM SERPL-SCNC: 4.3 MMOL/L — SIGNIFICANT CHANGE UP (ref 3.5–5.3)
PROTHROM AB SERPL-ACNC: 13.3 SEC — HIGH (ref 9.5–13)
RBC # BLD: 4.28 M/UL — SIGNIFICANT CHANGE UP (ref 3.8–5.2)
RBC # FLD: 27 % — HIGH (ref 10.3–14.5)
SODIUM SERPL-SCNC: 144 MMOL/L — SIGNIFICANT CHANGE UP (ref 135–145)
WBC # BLD: 8.28 K/UL — SIGNIFICANT CHANGE UP (ref 3.8–10.5)
WBC # FLD AUTO: 8.28 K/UL — SIGNIFICANT CHANGE UP (ref 3.8–10.5)

## 2023-09-15 PROCEDURE — 99233 SBSQ HOSP IP/OBS HIGH 50: CPT

## 2023-09-15 PROCEDURE — 99232 SBSQ HOSP IP/OBS MODERATE 35: CPT

## 2023-09-15 RX ORDER — INSULIN LISPRO 100/ML
9 VIAL (ML) SUBCUTANEOUS
Refills: 0 | Status: DISCONTINUED | OUTPATIENT
Start: 2023-09-15 | End: 2023-09-17

## 2023-09-15 RX ORDER — ASCORBIC ACID 60 MG
500 TABLET,CHEWABLE ORAL DAILY
Refills: 0 | Status: DISCONTINUED | OUTPATIENT
Start: 2023-09-15 | End: 2023-10-01

## 2023-09-15 RX ORDER — AZITHROMYCIN 500 MG/1
500 TABLET, FILM COATED ORAL EVERY 24 HOURS
Refills: 0 | Status: DISCONTINUED | OUTPATIENT
Start: 2023-09-16 | End: 2023-09-21

## 2023-09-15 RX ORDER — INSULIN GLARGINE 100 [IU]/ML
28 INJECTION, SOLUTION SUBCUTANEOUS AT BEDTIME
Refills: 0 | Status: DISCONTINUED | OUTPATIENT
Start: 2023-09-15 | End: 2023-09-16

## 2023-09-15 RX ORDER — INSULIN GLARGINE 100 [IU]/ML
30 INJECTION, SOLUTION SUBCUTANEOUS AT BEDTIME
Refills: 0 | Status: DISCONTINUED | OUTPATIENT
Start: 2023-09-15 | End: 2023-09-15

## 2023-09-15 RX ORDER — TRAMADOL HYDROCHLORIDE 50 MG/1
50 TABLET ORAL EVERY 8 HOURS
Refills: 0 | Status: DISCONTINUED | OUTPATIENT
Start: 2023-09-15 | End: 2023-09-22

## 2023-09-15 RX ORDER — LIDOCAINE 4 G/100G
2 CREAM TOPICAL DAILY
Refills: 0 | Status: DISCONTINUED | OUTPATIENT
Start: 2023-09-15 | End: 2023-10-01

## 2023-09-15 RX ORDER — INSULIN LISPRO 100/ML
7 VIAL (ML) SUBCUTANEOUS ONCE
Refills: 0 | Status: COMPLETED | OUTPATIENT
Start: 2023-09-15 | End: 2023-09-15

## 2023-09-15 RX ORDER — WARFARIN SODIUM 2.5 MG/1
5 TABLET ORAL ONCE
Refills: 0 | Status: COMPLETED | OUTPATIENT
Start: 2023-09-15 | End: 2023-09-15

## 2023-09-15 RX ORDER — AZITHROMYCIN 500 MG/1
500 TABLET, FILM COATED ORAL ONCE
Refills: 0 | Status: COMPLETED | OUTPATIENT
Start: 2023-09-15 | End: 2023-09-15

## 2023-09-15 RX ORDER — AZITHROMYCIN 500 MG/1
TABLET, FILM COATED ORAL
Refills: 0 | Status: DISCONTINUED | OUTPATIENT
Start: 2023-09-15 | End: 2023-09-21

## 2023-09-15 RX ORDER — CHLORHEXIDINE GLUCONATE 213 G/1000ML
1 SOLUTION TOPICAL
Refills: 0 | Status: DISCONTINUED | OUTPATIENT
Start: 2023-09-15 | End: 2023-10-01

## 2023-09-15 RX ADMIN — TRAMADOL HYDROCHLORIDE 50 MILLIGRAM(S): 50 TABLET ORAL at 08:49

## 2023-09-15 RX ADMIN — Medication 1 APPLICATION(S): at 06:02

## 2023-09-15 RX ADMIN — GABAPENTIN 100 MILLIGRAM(S): 400 CAPSULE ORAL at 21:56

## 2023-09-15 RX ADMIN — Medication 8: at 17:30

## 2023-09-15 RX ADMIN — Medication 7 UNIT(S): at 08:51

## 2023-09-15 RX ADMIN — FAMOTIDINE 20 MILLIGRAM(S): 10 INJECTION INTRAVENOUS at 17:31

## 2023-09-15 RX ADMIN — TRAMADOL HYDROCHLORIDE 50 MILLIGRAM(S): 50 TABLET ORAL at 17:42

## 2023-09-15 RX ADMIN — Medication 3 MILLILITER(S): at 06:03

## 2023-09-15 RX ADMIN — TIOTROPIUM BROMIDE 2 PUFF(S): 18 CAPSULE ORAL; RESPIRATORY (INHALATION) at 11:40

## 2023-09-15 RX ADMIN — BUDESONIDE AND FORMOTEROL FUMARATE DIHYDRATE 2 PUFF(S): 160; 4.5 AEROSOL RESPIRATORY (INHALATION) at 17:31

## 2023-09-15 RX ADMIN — VALACYCLOVIR 1000 MILLIGRAM(S): 500 TABLET, FILM COATED ORAL at 21:56

## 2023-09-15 RX ADMIN — POLYETHYLENE GLYCOL 3350 17 GRAM(S): 17 POWDER, FOR SOLUTION ORAL at 06:03

## 2023-09-15 RX ADMIN — Medication 600 MILLIGRAM(S): at 06:02

## 2023-09-15 RX ADMIN — Medication 16 MILLIGRAM(S): at 06:01

## 2023-09-15 RX ADMIN — MEXILETINE HYDROCHLORIDE 200 MILLIGRAM(S): 150 CAPSULE ORAL at 13:14

## 2023-09-15 RX ADMIN — Medication 600 MILLIGRAM(S): at 17:31

## 2023-09-15 RX ADMIN — MEXILETINE HYDROCHLORIDE 200 MILLIGRAM(S): 150 CAPSULE ORAL at 21:56

## 2023-09-15 RX ADMIN — CLOPIDOGREL BISULFATE 75 MILLIGRAM(S): 75 TABLET, FILM COATED ORAL at 11:43

## 2023-09-15 RX ADMIN — Medication 9 UNIT(S): at 17:30

## 2023-09-15 RX ADMIN — TRAMADOL HYDROCHLORIDE 50 MILLIGRAM(S): 50 TABLET ORAL at 09:35

## 2023-09-15 RX ADMIN — ATORVASTATIN CALCIUM 20 MILLIGRAM(S): 80 TABLET, FILM COATED ORAL at 21:57

## 2023-09-15 RX ADMIN — POLYETHYLENE GLYCOL 3350 17 GRAM(S): 17 POWDER, FOR SOLUTION ORAL at 17:32

## 2023-09-15 RX ADMIN — BUDESONIDE AND FORMOTEROL FUMARATE DIHYDRATE 2 PUFF(S): 160; 4.5 AEROSOL RESPIRATORY (INHALATION) at 06:01

## 2023-09-15 RX ADMIN — AZITHROMYCIN 255 MILLIGRAM(S): 500 TABLET, FILM COATED ORAL at 13:15

## 2023-09-15 RX ADMIN — Medication 3 MILLILITER(S): at 17:31

## 2023-09-15 RX ADMIN — FAMOTIDINE 20 MILLIGRAM(S): 10 INJECTION INTRAVENOUS at 06:01

## 2023-09-15 RX ADMIN — SENNA PLUS 2 TABLET(S): 8.6 TABLET ORAL at 21:56

## 2023-09-15 RX ADMIN — GABAPENTIN 100 MILLIGRAM(S): 400 CAPSULE ORAL at 13:14

## 2023-09-15 RX ADMIN — WARFARIN SODIUM 5 MILLIGRAM(S): 2.5 TABLET ORAL at 21:56

## 2023-09-15 RX ADMIN — VALACYCLOVIR 1000 MILLIGRAM(S): 500 TABLET, FILM COATED ORAL at 13:14

## 2023-09-15 RX ADMIN — CHLORHEXIDINE GLUCONATE 1 APPLICATION(S): 213 SOLUTION TOPICAL at 11:43

## 2023-09-15 RX ADMIN — TRAMADOL HYDROCHLORIDE 50 MILLIGRAM(S): 50 TABLET ORAL at 18:24

## 2023-09-15 RX ADMIN — Medication 1 APPLICATION(S): at 17:31

## 2023-09-15 RX ADMIN — VALACYCLOVIR 1000 MILLIGRAM(S): 500 TABLET, FILM COATED ORAL at 06:02

## 2023-09-15 RX ADMIN — MEXILETINE HYDROCHLORIDE 200 MILLIGRAM(S): 150 CAPSULE ORAL at 06:02

## 2023-09-15 RX ADMIN — INSULIN GLARGINE 28 UNIT(S): 100 INJECTION, SOLUTION SUBCUTANEOUS at 21:56

## 2023-09-15 RX ADMIN — GABAPENTIN 100 MILLIGRAM(S): 400 CAPSULE ORAL at 06:01

## 2023-09-15 RX ADMIN — Medication 3 MILLILITER(S): at 11:39

## 2023-09-15 NOTE — PROGRESS NOTE ADULT - ASSESSMENT
72 F w/h/o uncontrolled T2DM (A1C 9.4%) on basal/bolus insulin PTA. Unknown DM complications. Also COPD, secondary adrenal Insufficiency on chronic steroids, colorectal cancer s/p resection (colostomy bag), Chronic A fib on Eliquis, and tracheomalacia and multiple intubations, type A aortic dissection s/p aortic dissection repair on 9/07/22, sepsis. Pt well known to this provider from prior admissions. Pt recently admitted with SOB and found to have anemia and severe aortic stenosis>requiring valve in valve TAVR 9/7/23 discharged 9/10/23. Back with increasing productive cough, dyspnea and also found to have Herpes zoster on antiviral. Endocrine consult for management of hyperglycemia. Pt now with hypoglycemia even though she has been requiring high insulin doses PTA and during prior hospitalization. Will decrease all insulin doses and assess present insulin needs to goal -180mg/dL.         Home regimen: Lantus 18 untis at HS and Humalog 15 units ACTID

## 2023-09-15 NOTE — DIETITIAN INITIAL EVALUATION ADULT - REASON
Nutrition focused physical exam deferred at this time as pt is eating well and has not experienced any recent significant weight changes.

## 2023-09-15 NOTE — DIETITIAN INITIAL EVALUATION ADULT - PERTINENT LABORATORY DATA
09-15    144  |  105  |  13  ----------------------------<  48<LL>  4.3   |  23  |  0.72    Ca    9.1      15 Sep 2023 06:47    TPro  5.9<L>  /  Alb  3.7  /  TBili  0.3  /  DBili  x   /  AST  13  /  ALT  15  /  AlkPhos  71  09-14  POCT Blood Glucose.: 328 mg/dL (09-15-23 @ 17:23)  A1C with Estimated Average Glucose Result: 5.7 % (09-08-23 @ 06:39)  A1C with Estimated Average Glucose Result: 9.1 % (06-17-23 @ 10:27)  A1C with Estimated Average Glucose Result: 9.4 % (05-12-23 @ 07:40)

## 2023-09-15 NOTE — PROVIDER CONTACT NOTE (CRITICAL VALUE NOTIFICATION) - BACKGROUND
admitted for HF. shingles to abdomen on valtrex. hx of adrenal insufficiency on methylprednisone endocrine following pt

## 2023-09-15 NOTE — DIETITIAN INITIAL EVALUATION ADULT - NSFNSADHERENCEPTAFT_GEN_A_CORE
Prior to admission, pt states she followed a diabetic diet at home, monitoring her sugar and carbohydrate intake. Pt reported good appetite and intake prior to admission, eating 3 meals a day with snacks in between. Pt states she always pairs carbohydrates with protein, and is aware of what to eat and what not to eat to manage her blood glucose levels. Pt states she Humalog and Lantus Insulin at home for blood glucose management, and checks her fingersticks before she eats breakfast, lunch and dinner. Pt declined diabetes education, and declined diabetes education handouts.

## 2023-09-15 NOTE — ADVANCED PRACTICE NURSE CONSULT - RECOMMEDATIONS
Impression  bilateral sacrum / buttocks deep tissue injury present on admission  right elbow scar tissue   heels / feet podiatry  lesions  lower back      Recommendations  1. Bilateral  , sacral / buttocks  deep tissue injury    Topical therapy- sacral/bilateral buttocks- cleanse w/incontinent cleanser, pat dry & apply justyn moisture barrier cream twice daily & prn soiling .  monitor    for changes .   2. Elevate heels; apply Complete Cair air fluidized boots; ensure that the soles of the feet are not resting on the foot board of the bed.  podiatry to consulted follow there management    3.  Incontinent management - incontinent cleanser, pads,  timi care  BID  4. Maintain on an alternating air with low air loss surface   5. Turn & reposition every 2 hr; Use positioning pillow to turn and reposition, soft pillow between bony prominences; continue measures to decrease friction/shear/pressure.  6. Nutrition optimization.  7 bilateral elbow cleans with skin cleanser pat dry  apply sween cream daily keep open to air . montor for changes   8. lower back rash follow dermatology management   7.  waffle cushion when out of bed to chair .  plan of care reviewed with covering staff RN    Impression  bilateral sacrum / buttocks deep tissue injury present on admission  right elbow scar tissue   heels / feet podiatry  lesions  lower back      Recommendations  1. Bilateral  , sacral / buttocks  deep tissue injury    Topical therapy- sacral/bilateral buttocks- cleanse w/incontinent cleanser, pat dry & apply justyn moisture barrier cream twice daily & prn soiling .  monitor    for changes .   2. Elevate heels; apply Complete Cair air fluidized boots; ensure that the soles of the feet are not resting on the foot board of the bed.  podiatry to consulted follow there management    3.  Incontinent management - incontinent cleanser, pads,  timi care  BID  4. Maintain on an alternating air with low air loss surface   5. Turn & reposition every 2 hr; Use positioning pillow to turn and reposition, soft pillow between bony prominences; continue measures to decrease friction/shear/pressure.  6. Nutrition optimization.  7 bilateral elbow cleans with skin cleanser pat dry  apply "Sween 24 once a day moisturizer" daily    open to air . montor for changes   8. lower back rash Dermatology consult  follow dermatology management   7.  waffle cushion when out of bed to chair .  plan of care reviewed with covering staff RN

## 2023-09-15 NOTE — PROGRESS NOTE ADULT - ASSESSMENT
72 year old female with a complex pulmonary history including severe, life-threatening asthma (on chronic steroids and anti-TSLP), bronchiectasis, tracheomalacia s/p tracheoplasty who presents after a recent admission for bronchiectasis exacerbation (6/2023, treated with Ertapenem for ESBL proteus), and again in 9/2023 for an acute flare whose hospital course was complicated by identification of severe AS requiring valve in valve TAVR 9/7/23 discharged 9/10/23 who presents with increasing productive cough, dyspnea for which pulmonary is consulted.     Considering her symptoms of increased cough, change in sputum color and quantity, suspect she is exacerbating her bronchiectasis. She is growing numerous Proteus in her sputum.   With her chronic steroid use, bronchiectasis puts her at risk for DIEUDONNE and her imaging appears consistent and could explain some of her recurrent flares and symptoms. Would start work up and send 3 sputum AFB's. Continue steroids at her current dose, with low threshold to increase.    Recommend:   - 3 sputum AFB (please send two more)  - Considering culture history would start ertapenem. ID has been consulted and defer to their expertise.  - Bronchodilator: Standing duonebs q 4 hours, nebulized pulmicort  - Continue steroids at 16 mg for now; ideally in the setting of her Shingles we could decrease, however, with her current respiratory status would leave as is for now  - Ensure she is getting twice per day airway clearance: Albuterol nebulizer followed by saline nebulizer followed by cough assist device and encouragement to cough and clear  - Pulmonary will continue to follow   WNL

## 2023-09-15 NOTE — DIETITIAN INITIAL EVALUATION ADULT - PROBLEM SELECTOR PLAN 4
pulm following- Dr. Allison to see pt today  monitor O2 sats  ck chest xray  continue bronchodilators  continue symbicort and spiriva

## 2023-09-15 NOTE — DIETITIAN INITIAL EVALUATION ADULT - PROBLEM SELECTOR PLAN 3
continue mexitel 200 mg po q8   continue ac with coumadin  daily pt/ inr  monitor and supplement electrolytes

## 2023-09-15 NOTE — DIETITIAN INITIAL EVALUATION ADULT - NSICDXPASTSURGICALHX_GEN_ALL_CORE_FT
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    H/O pelvic surgery 5 years ago - s/p fracture    H/O total knee replacement, bilateral 5 years ago    History of partial hysterectomy 30 years ago - fibroids    History of sinus surgery multiple sinus surgeries    History of tracheomalacia 2015 - attempted tracheal stenting ([facility])- course complicated by obstruction, respiratory failure, multiple CPR attempts -  stent discontinued; 10/20/2016 Tracheobronchoplasty (Prolene Mesh) performed at [facility] by Dr Zapien    Rectal bleeding exam under anesthesia ([facility]) 2/2018    S/P aortic valve replacement     S/P bronchoscopy 6/5/2018 - [place] (Dr Zapien) no evidence of tracheobronchomalacia in trachea or bronchial tubes    S/P TAVR (transcatheter aortic valve replacement)

## 2023-09-15 NOTE — DIETITIAN INITIAL EVALUATION ADULT - ENERGY INTAKE
Currently, pt states she is eating % of meals in-house. Pt endorses fatigue with the menu options after recent lengthy hospital stay, however is ordering down for her foods and declines the need for assistance with food preferences. Pt requested RD to send up egg salad at dinner times, RD to honor as able. Pt endorses drinking Glucerna supplement 3x/day in-house, and wants to continue to receive supplement.  Variable intake noted previously in flow sheets, however recently pt noted to be eating % of meals./Adequate (%)

## 2023-09-15 NOTE — ADVANCED PRACTICE NURSE CONSULT - ASSESSMENT
arrived on unit, patient was found lying in a low air loss pressure redistribution support surface style bed.  patient  is able  to turn 1 person assist  and able view her skin.  patient stated has history of pressure injury that is still in process   of heeling   sacrum/buttocks non-blanchable deep red,   discoloration and hyperpigmentation and hypopigmentation   consistent with deep tissue injury   size approximately  5 cm x 5 cm x 0 cm.  present  on admission.  bilateral heels and feet to be consulted by podiatry  right elbow skin dry with hypopigmentation and hyperpigmentation contracted tissue. consistent with scar tissue .  lower back with red patches of skin with small blisters (vesicles ) crusted  lesion   Patient was educated on the importance for turning and positioning every 2 hours. the use of waffle cushion when out of bed to chair and, to shift her weight every 2 hours while in chair. the importance of keeping skin clean and dry and, to offload her heels/feet .and optimal nutrition .patient verbalized understanding by teach back   arrived on unit, patient was found lying in a low air loss pressure redistribution support surface style bed.  patient  is able  to turn 1 person assist  and able view her skin.  patient stated has history of pressure injury that is still in process   of heeling   sacrum/buttocks non-blanchable deep red,   discoloration and hyperpigmentation and hypopigmentation   consistent with deep tissue injury   size approximately  5 cm x 5 cm x 0 cm.  present  on admission.  bilateral heels and feet to be consulted by podiatry  right elbow skin dry with contracted  hypopigmented skin . consistent with scar tissue .  lower back with red patches of skin with small blisters (vesicles ) crusted  lesion   Patient was educated on the importance for turning and positioning every 2 hours. the use of waffle cushion when out of bed to chair and, to shift her weight every 2 hours while in chair. the importance of keeping skin clean and dry and, to offload her heels/feet .and optimal nutrition .patient verbalized understanding by teach back

## 2023-09-15 NOTE — DIETITIAN INITIAL EVALUATION ADULT - PERTINENT MEDS FT
MEDICATIONS  (STANDING):  albuterol/ipratropium for Nebulization 3 milliLiter(s) Nebulizer every 6 hours  atorvastatin 20 milliGRAM(s) Oral at bedtime  azithromycin  IVPB      budesonide  80 MICROgram(s)/formoterol 4.5 MICROgram(s) Inhaler 2 Puff(s) Inhalation two times a day  chlorhexidine 2% Cloths 1 Application(s) Topical <User Schedule>  clopidogrel Tablet 75 milliGRAM(s) Oral daily  famotidine    Tablet 20 milliGRAM(s) Oral two times a day  gabapentin 100 milliGRAM(s) Oral every 8 hours  guaiFENesin  milliGRAM(s) Oral every 12 hours  hydrocortisone 1% Cream 1 Application(s) Topical two times a day  insulin glargine Injectable (LANTUS) 28 Unit(s) SubCutaneous at bedtime  insulin lispro (ADMELOG) corrective regimen sliding scale   SubCutaneous three times a day before meals  insulin lispro (ADMELOG) corrective regimen sliding scale   SubCutaneous <User Schedule>  insulin lispro Injectable (ADMELOG) 9 Unit(s) SubCutaneous three times a day before meals  lidocaine   4% Patch 2 Patch Transdermal daily  methylPREDNISolone 16 milliGRAM(s) Oral daily  mexiletine 200 milliGRAM(s) Oral every 8 hours  polyethylene glycol 3350 17 Gram(s) Oral two times a day  polyethylene glycol 3350 17 Gram(s) Oral daily  senna 2 Tablet(s) Oral at bedtime  tiotropium 2.5 MICROgram(s) Inhaler 2 Puff(s) Inhalation daily  valACYclovir 1000 milliGRAM(s) Oral three times a day    MEDICATIONS  (PRN):  acetaminophen     Tablet .. 650 milliGRAM(s) Oral every 6 hours PRN Temp greater or equal to 38C (100.4F), Mild Pain (1 - 3)  simethicone 80 milliGRAM(s) Chew every 12 hours PRN Gas  traMADol 50 milliGRAM(s) Oral every 8 hours PRN Moderate Pain (4 - 6)

## 2023-09-15 NOTE — DIETITIAN INITIAL EVALUATION ADULT - NS FNS DIET ORDER
Diet, Regular:   Consistent Carbohydrate {Evening Snack} (CSTCHOSN)  Supplement Feeding Modality:  Oral  Glucerna Shake Cans or Servings Per Day:  3       Frequency:  Daily (09-15-23 @ 17:17)

## 2023-09-15 NOTE — DIETITIAN INITIAL EVALUATION ADULT - OTHER CALCULATIONS
Estimated protein-energy needs calculated using current daily weight (standing) 145 pounds (09-15) with consideration for pressure injuries.   Defer fluid calculations to the medical team.

## 2023-09-15 NOTE — DIETITIAN INITIAL EVALUATION ADULT - NSFNSGIIOFT_GEN_A_CORE
Pt with a colostomy (per chart). Reported no GI distress, colostomy output normal (per pt).   Pt reported no nausea or vomiting.   - Note: Pt is ordered for Pepcid (per orders)  Last documented BM: 09-12 (per flow sheet)  Bowel Regimen: Miralax, Mylicon, Senna (per orders)

## 2023-09-15 NOTE — DIETITIAN INITIAL EVALUATION ADULT - REASON FOR ADMISSION
Heart failure  Per chart, "72 y.o. F with PMH of asthma on chronic steroids, bronchiectasis, allergic rhinitis,  recurrent PNA,  tracheomalacia s/p tracheoplasty, ESBL proteus infections (sputum),  diabetes, paroxysmal A-fib on coumadin, colorectal cancer many years ago status post colostomy, recent hospitalization at an outside hospital  for acute respiratory failure with hypoxia secondary to asthma/COPD exacerbation and bronchopneumonia 6/5/2023 - 6/16/2023), and AVR 2022 with Dr. Cabrera.  Pt admits to chronic SOB and cough for years and is up to date on vaccines. Dr. Allison follows as an outpatient for her COPD. s/p 9/6 TAVR- [facility] with Dr. Gonsalves and Dr. Leahy. discharged home 9/10. Pt presents to office today for f/u visit  w/ c/o of worsening SOB. Pt to be admitted for further workup and eval. Pt stable at this time."

## 2023-09-15 NOTE — DIETITIAN INITIAL EVALUATION ADULT - NSFNSPHYEXAMSKINFT_GEN_A_CORE
Per Nursing Flow Sheets:   1) Sacrum - Suspected DTI  2) Left Heel - Suspected DTI  3) Right Heel - Suspected DTI  4) Right Elbow - Suspected DTI    Per Advanced Practice Nurse Consult-Wound Ostomy Care note on 09-15: "Bilateral sacrum / buttocks deep tissue injury present on admission. Bilateral heels and feet to be consulted by podiatry."

## 2023-09-15 NOTE — DIETITIAN INITIAL EVALUATION ADULT - ORAL INTAKE PTA/DIET HISTORY
Nutrition assessment completed at bedside. Pt was just recently discharged from [facility] on 09-10, noted pt was eating well at previous visit. Pt reported good appetite and PO intake prior to this hospital admission, stated appetite has been good. Pt reported no recent weight changes, stated weight has been stable at ~143 pounds. Confirmed food allergy to shellfish (breathing difficulty).      Note: Admitting diagnosis of heart failure documented in chart. Pt reports no history of heart failure.

## 2023-09-15 NOTE — DIETITIAN INITIAL EVALUATION ADULT - ADD RECOMMEND
1) Recommend continue current diet as tolerated: Consistent Carbohydrate, Regular with Evening Snack.   2) Continue Glucerna 3x/day to optimize protein-energy intake and per pt preference.  3) Recommend adding multivitamin and vitamin C daily for micronutrient coverage and wound healing unless contraindicated.   4) Recommend Isidro BID for wound healing unless contraindicated.  5) Monitor glucose fingersticks.  6) RD obtained food preferences, will honor as able.   7) Monitor nutritional intake, tolerance to diet prescription, weights, labs, and skin integrity.

## 2023-09-15 NOTE — PROGRESS NOTE ADULT - SUBJECTIVE AND OBJECTIVE BOX
DIABETES FOLLOW UP NOTE: Saw pt earlier today    Chief Complaint: Endocrine consult requested for management of T2DM    INTERVAL HX: Pt stable, reports tolerating POs. Noted FBG 84 per POC and 48 per BMP this am. Pt reports feeling different this morning. Stated feeling a little shaky this morning. Pt refused meal time insulin with breakfast and had a BG 86 at lunch time while on present insulin doses. Pt states she is eating well but not getting the food she is ordering. Pt on Methylprednisolone 16mg daily (chronic dose).       Review of Systems:  General: As above  Cardiovascular: No chest pain, palpitations  Respiratory: No SOB, no cough  GI: No nausea, vomiting, abdominal pain  Endocrine: No polyuria, polydipsia or S&Sx of hypoglycemia    Allergies    tetanus toxoid (Short breath)  Dilaudid (Short breath)  codeine (Short breath)  iodine (Short breath; Swelling)  Avelox (Short breath; Pruritus)  shellfish (Anaphylaxis)  cefepime (Anaphylaxis)  aspirin (Short breath)  Valium (Short breath)  penicillin (Anaphylaxis)    Intolerances      MEDICATIONS:  atorvastatin 20 milliGRAM(s) Oral at bedtime  azithromycin  IVPB      insulin glargine Injectable (LANTUS) 30 Unit(s) SubCutaneous at bedtime  insulin lispro (ADMELOG) corrective regimen sliding scale   SubCutaneous <User Schedule>  insulin lispro (ADMELOG) corrective regimen sliding scale   SubCutaneous three times a day before meals  insulin lispro Injectable (ADMELOG) 15 Unit(s) SubCutaneous before breakfast  insulin lispro Injectable (ADMELOG) 15 Unit(s) SubCutaneous before dinner  insulin lispro Injectable (ADMELOG) 15 Unit(s) SubCutaneous before lunch  methylPREDNISolone 16 milliGRAM(s) Oral daily  valACYclovir 1000 milliGRAM(s) Oral three times a day      PHYSICAL EXAM:  VITALS: T(C): 36.5 (09-15-23 @ 16:28)  T(F): 97.7 (09-15-23 @ 16:28), Max: 98.1 (09-14-23 @ 20:52)  HR: 89 (09-15-23 @ 16:28) (86 - 96)  BP: 128/70 (09-15-23 @ 16:28) (108/69 - 154/73)  RR:  (18 - 18)  SpO2:  (97% - 99%)  Wt(kg): --  GENERAL: Female laying in bed in NAD  Abdomen: Soft, nontender, non distended, central adiposity.   : Skin lession in L groin spread to lower abdomen  Extremities: Warm, no edema in all 4 exts  NEURO: A&O X3    LABS:  POCT Blood Glucose.: 86 mg/dL (09-15-23 @ 12:35)  POCT Blood Glucose.: 103 mg/dL (09-15-23 @ 08:49)  POCT Blood Glucose.: 84 mg/dL (09-15-23 @ 07:49)  POCT Blood Glucose.: 183 mg/dL (09-14-23 @ 20:10)  POCT Blood Glucose.: 91 mg/dL (09-14-23 @ 17:52)  POCT Blood Glucose.: 129 mg/dL (09-14-23 @ 12:24)  POCT Blood Glucose.: 74 mg/dL (09-14-23 @ 12:01)  POCT Blood Glucose.: 176 mg/dL (09-14-23 @ 08:38)  POCT Blood Glucose.: 123 mg/dL (09-13-23 @ 21:34)  POCT Blood Glucose.: 304 mg/dL (09-13-23 @ 16:33)  POCT Blood Glucose.: 79 mg/dL (09-13-23 @ 12:33)  POCT Blood Glucose.: 191 mg/dL (09-13-23 @ 08:29)  POCT Blood Glucose.: 237 mg/dL (09-13-23 @ 02:04)  POCT Blood Glucose.: 180 mg/dL (09-12-23 @ 20:56)  POCT Blood Glucose.: 364 mg/dL (09-12-23 @ 18:23)                            9.8    8.28  )-----------( 249      ( 15 Sep 2023 06:44 )             34.5       09-15    144  |  105  |  13  ----------------------------<  48<LL>  4.3   |  23  |  0.72    eGFR: 88    Ca    9.1      09-15    TPro  5.9<L>  /  Alb  3.7  /  TBili  0.3  /  DBili  x   /  AST  13  /  ALT  15  /  AlkPhos  71  09-14    Thyroid Function Tests:  09-06 @ 09:32 TSH 0.49 FreeT4 1.2 T3 82 Anti TPO -- Anti Thyroglobulin Ab -- TSI --      A1C with Estimated Average Glucose Result: 5.7 % (09-08-23 @ 06:39)      Estimated Average Glucose: 117 mg/dL (09-08-23 @ 06:39)

## 2023-09-15 NOTE — DIETITIAN INITIAL EVALUATION ADULT - OTHER INFO
Weights:  - UBW (per patient): 143 pounds (pt stated it has been consistent this year)  - Dosing Weight (per chart): 143 pounds (09-12)  - Daily Weights (per flow sheets): 145 pounds (09-15) (standing) (used for BMI)   - Bed Scale Weight (taken by RD): RD unable to obtain bed scale wt as pt was seated in chair at time of interview.   - Per [facility] HIE: 143.1 pounds (08-11), 143 pounds (07-25), 140 pounds (06-04), 130.15 pounds (05-05), 135 pounds (03-19, 129 pounds (02-03), 129 pounds (01-24)  Current weights appear stable. RD to continue to monitor weights and trends as able.     Pt with shingles (per chart).  - Ordered for antibiotics in-house (per orders).    Pt with history of type 2 diabetes (per chart).  - Endocrine following   - A1C 5.7% (good glycemic control for age) (09-08), previously 9.1% (06-17).   - Blood Glucose elevated this afternoon, 328 mg/dL (09-15).   - Ordered for Insulin Lantus, Insulin Lispro Injectable, and Insulin Lispro (ADMELOG) Corrective Regimen Sliding Scale in-house for glycemic control (per orders).    Pt ordered for methylprednisolone (may elevate blood glucose levels) (per orders).

## 2023-09-15 NOTE — DIETITIAN INITIAL EVALUATION ADULT - PERSON TAUGHT/METHOD
Provided education on high-protein diet for wound healing and preservation of lean body mass. Encouraged consumption of HBV foods, prioritizing protein foods first at meal times, and importance of  meeting adequate protein-energy needs. Provided brief overview of education on Nutrition Therapy for Type 2 Diabetes. Emphasis on what foods contain carbohydrates, importance of pairing carbohydrates with protein for glycemic control; choosing whole grains vs refined carbohydrates; limiting refined sugars, portion sizes. Obtained food preferences, will honor as able. Pt aware RD remains available./verbal instruction/patient instructed

## 2023-09-15 NOTE — CHART NOTE - NSCHARTNOTEFT_GEN_A_CORE
Registered Dietitian Quick Note:     Note: Admitting diagnosis of "Heart Failure" listed in patient's chart. Pt listed on hospital STAR list, however pt reported having no history of heart failure. No active heart failure diagnosis reported in chart.     STAR heart failure education not appropriate at this time.     RD remains available and will follow up with patient per hospital protocol.     Yaneli Frederick RDN   TEAMS

## 2023-09-15 NOTE — PROVIDER CONTACT NOTE (CRITICAL VALUE NOTIFICATION) - ASSESSMENT
pt aox4. states she felt shakey earlier as if her sugar was low. pt eating breakfast now & refusing 15u premeal insulin

## 2023-09-15 NOTE — PROGRESS NOTE ADULT - SUBJECTIVE AND OBJECTIVE BOX
Interval Events:   No acute events  Reports she is still making copious sputum and has some wheezing  Cannot take a deep breath without wheeze and cough    REVIEW OF SYSTEMS:  Denies fevers, chills, chest pain, palpiations, +cough, +shortness of breath  Negative unless stated above    OBJECTIVE:  ICU Vital Signs Last 24 Hrs  T(C): 36.6 (15 Sep 2023 11:13), Max: 36.7 (14 Sep 2023 20:52)  T(F): 97.9 (15 Sep 2023 11:13), Max: 98.1 (14 Sep 2023 20:52)  HR: 87 (15 Sep 2023 11:13) (86 - 96)  BP: 108/69 (15 Sep 2023 11:13) (108/69 - 154/73)  BP(mean): --  ABP: --  ABP(mean): --  RR: 18 (15 Sep 2023 11:13) (18 - 18)  SpO2: 98% (15 Sep 2023 11:13) (97% - 98%)    O2 Parameters below as of 15 Sep 2023 11:13  Patient On (Oxygen Delivery Method): room air              09-14 @ 07:01  -  09-15 @ 07:00  --------------------------------------------------------  IN: 310 mL / OUT: 250 mL / NET: 60 mL    09-15 @ 07:01  -  09-15 @ 13:48  --------------------------------------------------------  IN: 310 mL / OUT: 0 mL / NET: 310 mL      CAPILLARY BLOOD GLUCOSE      POCT Blood Glucose.: 86 mg/dL (15 Sep 2023 12:35)      PHYSICAL EXAM:  General: Awake, alert, oriented X 3.   Respiratory: Normal chest expansion  Diffuse rhonchi and course breath sounds throughout   ++ wheeze  Prominent wet cough  Cardiovascular: S1 S2 normal. No murmurs, rubs or gallops.   Abdomen: Soft, non-tender, non-distended. No organomegaly.  Extremities: Warm to touch. Peripheral pulse palpable. No pedal edema.   Skin: rash on right abdomen      HOSPITAL MEDICATIONS:  PULM MEDICATIONS  (STANDING):  albuterol/ipratropium for Nebulization 3 milliLiter(s) Nebulizer every 6 hours  azithromycin  IVPB      budesonide  80 MICROgram(s)/formoterol 4.5 MICROgram(s) Inhaler 2 Puff(s) Inhalation two times a day  guaiFENesin  milliGRAM(s) Oral every 12 hours  methylPREDNISolone 16 milliGRAM(s) Oral daily  tiotropium 2.5 MICROgram(s) Inhaler 2 Puff(s) Inhalation daily  valACYclovir 1000 milliGRAM(s) Oral three times a day    LABS:                        9.8    8.28  )-----------( 249      ( 15 Sep 2023 06:44 )             34.5     Hgb Trend: 9.8<--, 10.0<--, 10.0<--, 10.4<--, 10.8<--  09-15    144  |  105  |  13  ----------------------------<  48<LL>  4.3   |  23  |  0.72    Ca    9.1      15 Sep 2023 06:47    TPro  5.9<L>  /  Alb  3.7  /  TBili  0.3  /  DBili  x   /  AST  13  /  ALT  15  /  AlkPhos  71  09-14    Creatinine Trend: 0.72<--, 0.64<--, 0.71<--, 0.65<--, 0.69<--, 0.62<--  PT/INR - ( 15 Sep 2023 06:54 )   PT: 13.3 sec;   INR: 1.22 ratio         PTT - ( 15 Sep 2023 06:54 )  PTT:29.6 sec  Urinalysis Basic - ( 15 Sep 2023 06:47 )    Color: x / Appearance: x / SG: x / pH: x  Gluc: 48 mg/dL / Ketone: x  / Bili: x / Urobili: x   Blood: x / Protein: x / Nitrite: x   Leuk Esterase: x / RBC: x / WBC x   Sq Epi: x / Non Sq Epi: x / Bacteria: x    MICROBIOLOGY:   9/3/23: Moderate Proteus Mirabilis ESBL  8/18/23: MRSA  8/17/23: MRSA  6/9/23: Proteus Mirabilis ESBL  5/13/23: Proteus Mirabilis ESBL  10/4/22: MRSA  9/8/22:  Proteus Mirabilis ESBL  6/9/22:  Proteus Mirabilis ESBL  4/26/22: MRSA  3/29/22: MRSA    AFB Smears:   8/14/23: AFB negative  10/3/21: AFB negative

## 2023-09-15 NOTE — PROGRESS NOTE ADULT - PROBLEM SELECTOR PLAN 4
On chronic steroids at home > medrol 8mg qd from admission in 2022  Per pt she has been sick this year with multiple hospitalizations requiring pulse steroids. Was on Methylprednisolone 28mg PTA.   -Pt on slow titration back to Medrol 8mg qd  -Re-order MTP 16mg for 9/7> team aware   - Will need stress steroids if acutely ill or for OR.

## 2023-09-15 NOTE — PROGRESS NOTE ADULT - SUBJECTIVE AND OBJECTIVE BOX
DATE OF SERVICE: 09-15-23 @ 11:54    Patient is a 72y old  Female who presents with a chief complaint of sob (14 Sep 2023 17:32)      SUBJECTIVE / OVERNIGHT EVENTS:  No chest pain. No shortness of breath. No complaints. No events overnight.     MEDICATIONS  (STANDING):  albuterol/ipratropium for Nebulization 3 milliLiter(s) Nebulizer every 6 hours  atorvastatin 20 milliGRAM(s) Oral at bedtime  budesonide  80 MICROgram(s)/formoterol 4.5 MICROgram(s) Inhaler 2 Puff(s) Inhalation two times a day  chlorhexidine 2% Cloths 1 Application(s) Topical <User Schedule>  clopidogrel Tablet 75 milliGRAM(s) Oral daily  famotidine    Tablet 20 milliGRAM(s) Oral two times a day  gabapentin 100 milliGRAM(s) Oral every 8 hours  guaiFENesin  milliGRAM(s) Oral every 12 hours  hydrocortisone 1% Cream 1 Application(s) Topical two times a day  insulin glargine Injectable (LANTUS) 30 Unit(s) SubCutaneous at bedtime  insulin lispro (ADMELOG) corrective regimen sliding scale   SubCutaneous three times a day before meals  insulin lispro (ADMELOG) corrective regimen sliding scale   SubCutaneous <User Schedule>  insulin lispro Injectable (ADMELOG) 15 Unit(s) SubCutaneous before lunch  insulin lispro Injectable (ADMELOG) 15 Unit(s) SubCutaneous before breakfast  insulin lispro Injectable (ADMELOG) 15 Unit(s) SubCutaneous before dinner  methylPREDNISolone 16 milliGRAM(s) Oral daily  mexiletine 200 milliGRAM(s) Oral every 8 hours  polyethylene glycol 3350 17 Gram(s) Oral two times a day  polyethylene glycol 3350 17 Gram(s) Oral daily  senna 2 Tablet(s) Oral at bedtime  tiotropium 2.5 MICROgram(s) Inhaler 2 Puff(s) Inhalation daily  valACYclovir 1000 milliGRAM(s) Oral three times a day    MEDICATIONS  (PRN):  acetaminophen     Tablet .. 650 milliGRAM(s) Oral every 6 hours PRN Temp greater or equal to 38C (100.4F), Mild Pain (1 - 3)  simethicone 80 milliGRAM(s) Chew every 12 hours PRN Gas  traMADol 50 milliGRAM(s) Oral every 8 hours PRN Moderate Pain (4 - 6)      Vital Signs Last 24 Hrs  T(C): 36.6 (15 Sep 2023 11:13), Max: 36.7 (14 Sep 2023 20:52)  T(F): 97.9 (15 Sep 2023 11:13), Max: 98.1 (14 Sep 2023 20:52)  HR: 87 (15 Sep 2023 11:13) (84 - 96)  BP: 108/69 (15 Sep 2023 11:13) (108/69 - 154/73)  BP(mean): --  RR: 18 (15 Sep 2023 11:13) (18 - 19)  SpO2: 98% (15 Sep 2023 11:13) (97% - 98%)    Parameters below as of 15 Sep 2023 11:13  Patient On (Oxygen Delivery Method): room air      CAPILLARY BLOOD GLUCOSE      POCT Blood Glucose.: 103 mg/dL (15 Sep 2023 08:49)  POCT Blood Glucose.: 84 mg/dL (15 Sep 2023 07:49)  POCT Blood Glucose.: 183 mg/dL (14 Sep 2023 20:10)  POCT Blood Glucose.: 91 mg/dL (14 Sep 2023 17:52)  POCT Blood Glucose.: 129 mg/dL (14 Sep 2023 12:24)  POCT Blood Glucose.: 74 mg/dL (14 Sep 2023 12:01)    I&O's Summary    14 Sep 2023 07:01  -  15 Sep 2023 07:00  --------------------------------------------------------  IN: 310 mL / OUT: 250 mL / NET: 60 mL    15 Sep 2023 07:01  -  15 Sep 2023 11:54  --------------------------------------------------------  IN: 310 mL / OUT: 0 mL / NET: 310 mL        PHYSICAL EXAM:  GENERAL: NAD, well-developed  HEAD:  Atraumatic, Normocephalic  EYES: EOMI, PERRLA, conjunctiva and sclera clear  NECK: Supple, No JVD  CHEST/LUNG: Clear to auscultation bilaterally; No wheeze  HEART: Regular rate and rhythm; No murmurs, rubs, or gallops  ABDOMEN: Soft, Nontender, Nondistended; Bowel sounds present  EXTREMITIES:  2+ Peripheral Pulses, No clubbing, cyanosis, or edema  PSYCH: AAOx3  NEUROLOGY: non-focal  SKIN: No rashes or lesions    LABS:                        9.8    8.28  )-----------( 249      ( 15 Sep 2023 06:44 )             34.5     09-15    144  |  105  |  13  ----------------------------<  48<LL>  4.3   |  23  |  0.72    Ca    9.1      15 Sep 2023 06:47    TPro  5.9<L>  /  Alb  3.7  /  TBili  0.3  /  DBili  x   /  AST  13  /  ALT  15  /  AlkPhos  71  09-14    PT/INR - ( 15 Sep 2023 06:54 )   PT: 13.3 sec;   INR: 1.22 ratio         PTT - ( 15 Sep 2023 06:54 )  PTT:29.6 sec      Urinalysis Basic - ( 15 Sep 2023 06:47 )    Color: x / Appearance: x / SG: x / pH: x  Gluc: 48 mg/dL / Ketone: x  / Bili: x / Urobili: x   Blood: x / Protein: x / Nitrite: x   Leuk Esterase: x / RBC: x / WBC x   Sq Epi: x / Non Sq Epi: x / Bacteria: x        RADIOLOGY & ADDITIONAL TESTS:    Imaging Personally Reviewed:    Consultant(s) Notes Reviewed:      Care Discussed with Consultants/Other Providers:

## 2023-09-15 NOTE — DIETITIAN INITIAL EVALUATION ADULT - PROBLEM SELECTOR PLAN 1
continue plavix and coumadin for AC  bilateral groin sites stable  ck ekg  no echo at this time as per Dr. Leahy

## 2023-09-15 NOTE — DIETITIAN INITIAL EVALUATION ADULT - REASON INDICATOR FOR ASSESSMENT
RD consult warranted for DTI pressure injury  Source: Patient, Electronic Medical Record  Chart reviewed, events noted.

## 2023-09-16 ENCOUNTER — TRANSCRIPTION ENCOUNTER (OUTPATIENT)
Age: 73
End: 2023-09-16

## 2023-09-16 LAB
ANION GAP SERPL CALC-SCNC: 13 MMOL/L — SIGNIFICANT CHANGE UP (ref 5–17)
APTT BLD: 30.5 SEC — SIGNIFICANT CHANGE UP (ref 24.5–35.6)
BUN SERPL-MCNC: 13 MG/DL — SIGNIFICANT CHANGE UP (ref 7–23)
CALCIUM SERPL-MCNC: 8.9 MG/DL — SIGNIFICANT CHANGE UP (ref 8.4–10.5)
CHLORIDE SERPL-SCNC: 104 MMOL/L — SIGNIFICANT CHANGE UP (ref 96–108)
CO2 SERPL-SCNC: 22 MMOL/L — SIGNIFICANT CHANGE UP (ref 22–31)
CREAT SERPL-MCNC: 0.62 MG/DL — SIGNIFICANT CHANGE UP (ref 0.5–1.3)
EGFR: 93 ML/MIN/1.73M2 — SIGNIFICANT CHANGE UP
GLUCOSE BLDC GLUCOMTR-MCNC: 162 MG/DL — HIGH (ref 70–99)
GLUCOSE BLDC GLUCOMTR-MCNC: 201 MG/DL — HIGH (ref 70–99)
GLUCOSE BLDC GLUCOMTR-MCNC: 215 MG/DL — HIGH (ref 70–99)
GLUCOSE BLDC GLUCOMTR-MCNC: 308 MG/DL — HIGH (ref 70–99)
GLUCOSE BLDC GLUCOMTR-MCNC: 95 MG/DL — SIGNIFICANT CHANGE UP (ref 70–99)
GLUCOSE SERPL-MCNC: 138 MG/DL — HIGH (ref 70–99)
HCT VFR BLD CALC: 37.8 % — SIGNIFICANT CHANGE UP (ref 34.5–45)
HGB BLD-MCNC: 10.6 G/DL — LOW (ref 11.5–15.5)
INR BLD: 1.29 RATIO — HIGH (ref 0.85–1.18)
MCHC RBC-ENTMCNC: 22.8 PG — LOW (ref 27–34)
MCHC RBC-ENTMCNC: 28 GM/DL — LOW (ref 32–36)
MCV RBC AUTO: 81.5 FL — SIGNIFICANT CHANGE UP (ref 80–100)
MRSA PCR RESULT.: DETECTED
NRBC # BLD: 0 /100 WBCS — SIGNIFICANT CHANGE UP (ref 0–0)
PLATELET # BLD AUTO: 275 K/UL — SIGNIFICANT CHANGE UP (ref 150–400)
POTASSIUM SERPL-MCNC: 4.1 MMOL/L — SIGNIFICANT CHANGE UP (ref 3.5–5.3)
POTASSIUM SERPL-SCNC: 4.1 MMOL/L — SIGNIFICANT CHANGE UP (ref 3.5–5.3)
PROTHROM AB SERPL-ACNC: 13.4 SEC — HIGH (ref 9.5–13)
RBC # BLD: 4.64 M/UL — SIGNIFICANT CHANGE UP (ref 3.8–5.2)
RBC # FLD: 25.1 % — HIGH (ref 10.3–14.5)
S AUREUS DNA NOSE QL NAA+PROBE: DETECTED
SODIUM SERPL-SCNC: 139 MMOL/L — SIGNIFICANT CHANGE UP (ref 135–145)
WBC # BLD: 10.71 K/UL — HIGH (ref 3.8–10.5)
WBC # FLD AUTO: 10.71 K/UL — HIGH (ref 3.8–10.5)

## 2023-09-16 PROCEDURE — 99232 SBSQ HOSP IP/OBS MODERATE 35: CPT

## 2023-09-16 RX ORDER — DIPHENHYDRAMINE HCL 50 MG
25 CAPSULE ORAL ONCE
Refills: 0 | Status: COMPLETED | OUTPATIENT
Start: 2023-09-16 | End: 2023-09-16

## 2023-09-16 RX ORDER — WARFARIN SODIUM 2.5 MG/1
5 TABLET ORAL ONCE
Refills: 0 | Status: COMPLETED | OUTPATIENT
Start: 2023-09-16 | End: 2023-09-16

## 2023-09-16 RX ORDER — MUPIROCIN 20 MG/G
1 OINTMENT TOPICAL
Refills: 0 | Status: COMPLETED | OUTPATIENT
Start: 2023-09-16 | End: 2023-09-21

## 2023-09-16 RX ORDER — INSULIN GLARGINE 100 [IU]/ML
22 INJECTION, SOLUTION SUBCUTANEOUS AT BEDTIME
Refills: 0 | Status: DISCONTINUED | OUTPATIENT
Start: 2023-09-16 | End: 2023-09-17

## 2023-09-16 RX ADMIN — Medication 16 MILLIGRAM(S): at 05:16

## 2023-09-16 RX ADMIN — TIOTROPIUM BROMIDE 2 PUFF(S): 18 CAPSULE ORAL; RESPIRATORY (INHALATION) at 12:29

## 2023-09-16 RX ADMIN — Medication 1 TABLET(S): at 13:14

## 2023-09-16 RX ADMIN — AZITHROMYCIN 255 MILLIGRAM(S): 500 TABLET, FILM COATED ORAL at 13:53

## 2023-09-16 RX ADMIN — Medication 3 MILLILITER(S): at 23:41

## 2023-09-16 RX ADMIN — Medication 4: at 08:53

## 2023-09-16 RX ADMIN — Medication 3 MILLILITER(S): at 13:13

## 2023-09-16 RX ADMIN — TRAMADOL HYDROCHLORIDE 50 MILLIGRAM(S): 50 TABLET ORAL at 01:14

## 2023-09-16 RX ADMIN — TRAMADOL HYDROCHLORIDE 50 MILLIGRAM(S): 50 TABLET ORAL at 20:42

## 2023-09-16 RX ADMIN — POLYETHYLENE GLYCOL 3350 17 GRAM(S): 17 POWDER, FOR SOLUTION ORAL at 06:49

## 2023-09-16 RX ADMIN — Medication 600 MILLIGRAM(S): at 05:15

## 2023-09-16 RX ADMIN — Medication 9 UNIT(S): at 13:12

## 2023-09-16 RX ADMIN — CHLORHEXIDINE GLUCONATE 1 APPLICATION(S): 213 SOLUTION TOPICAL at 06:46

## 2023-09-16 RX ADMIN — Medication 1 APPLICATION(S): at 05:16

## 2023-09-16 RX ADMIN — TRAMADOL HYDROCHLORIDE 50 MILLIGRAM(S): 50 TABLET ORAL at 09:49

## 2023-09-16 RX ADMIN — Medication 2: at 13:11

## 2023-09-16 RX ADMIN — VALACYCLOVIR 1000 MILLIGRAM(S): 500 TABLET, FILM COATED ORAL at 13:16

## 2023-09-16 RX ADMIN — WARFARIN SODIUM 5 MILLIGRAM(S): 2.5 TABLET ORAL at 22:40

## 2023-09-16 RX ADMIN — Medication 1 APPLICATION(S): at 18:18

## 2023-09-16 RX ADMIN — FAMOTIDINE 20 MILLIGRAM(S): 10 INJECTION INTRAVENOUS at 05:15

## 2023-09-16 RX ADMIN — MEXILETINE HYDROCHLORIDE 200 MILLIGRAM(S): 150 CAPSULE ORAL at 05:15

## 2023-09-16 RX ADMIN — MEXILETINE HYDROCHLORIDE 200 MILLIGRAM(S): 150 CAPSULE ORAL at 20:42

## 2023-09-16 RX ADMIN — Medication 25 MILLIGRAM(S): at 22:34

## 2023-09-16 RX ADMIN — GABAPENTIN 100 MILLIGRAM(S): 400 CAPSULE ORAL at 05:15

## 2023-09-16 RX ADMIN — VALACYCLOVIR 1000 MILLIGRAM(S): 500 TABLET, FILM COATED ORAL at 05:15

## 2023-09-16 RX ADMIN — Medication 500 MILLIGRAM(S): at 13:14

## 2023-09-16 RX ADMIN — BUDESONIDE AND FORMOTEROL FUMARATE DIHYDRATE 2 PUFF(S): 160; 4.5 AEROSOL RESPIRATORY (INHALATION) at 05:16

## 2023-09-16 RX ADMIN — GABAPENTIN 100 MILLIGRAM(S): 400 CAPSULE ORAL at 20:41

## 2023-09-16 RX ADMIN — TRAMADOL HYDROCHLORIDE 50 MILLIGRAM(S): 50 TABLET ORAL at 02:49

## 2023-09-16 RX ADMIN — Medication 3 MILLILITER(S): at 18:18

## 2023-09-16 RX ADMIN — Medication 600 MILLIGRAM(S): at 18:15

## 2023-09-16 RX ADMIN — FAMOTIDINE 20 MILLIGRAM(S): 10 INJECTION INTRAVENOUS at 18:15

## 2023-09-16 RX ADMIN — Medication 8: at 18:17

## 2023-09-16 RX ADMIN — INSULIN GLARGINE 22 UNIT(S): 100 INJECTION, SOLUTION SUBCUTANEOUS at 22:34

## 2023-09-16 RX ADMIN — Medication 3 MILLILITER(S): at 05:16

## 2023-09-16 RX ADMIN — TRAMADOL HYDROCHLORIDE 50 MILLIGRAM(S): 50 TABLET ORAL at 21:52

## 2023-09-16 RX ADMIN — Medication 9 UNIT(S): at 08:53

## 2023-09-16 RX ADMIN — MEXILETINE HYDROCHLORIDE 200 MILLIGRAM(S): 150 CAPSULE ORAL at 13:15

## 2023-09-16 RX ADMIN — SENNA PLUS 2 TABLET(S): 8.6 TABLET ORAL at 20:41

## 2023-09-16 RX ADMIN — CLOPIDOGREL BISULFATE 75 MILLIGRAM(S): 75 TABLET, FILM COATED ORAL at 13:14

## 2023-09-16 RX ADMIN — VALACYCLOVIR 1000 MILLIGRAM(S): 500 TABLET, FILM COATED ORAL at 20:42

## 2023-09-16 RX ADMIN — GABAPENTIN 100 MILLIGRAM(S): 400 CAPSULE ORAL at 13:15

## 2023-09-16 RX ADMIN — ATORVASTATIN CALCIUM 20 MILLIGRAM(S): 80 TABLET, FILM COATED ORAL at 20:42

## 2023-09-16 RX ADMIN — BUDESONIDE AND FORMOTEROL FUMARATE DIHYDRATE 2 PUFF(S): 160; 4.5 AEROSOL RESPIRATORY (INHALATION) at 18:18

## 2023-09-16 RX ADMIN — Medication 9 UNIT(S): at 18:17

## 2023-09-16 NOTE — DISCHARGE NOTE PROVIDER - CARE PROVIDER_API CALL
Rico Glover  Cardiology  [address] [address] [place] [address], [address]  [place] [address]  Phone: [phone]  Fax: [phone]  Follow Up Time:     Eulalio Allison  Pulmonary Disease  [address] [facility], [address]  [place] [address]  Phone: [phone]  Fax: [phone]  Follow Up Time:    Rico Glover  Cardiology  [address] [address] [place] [address], [address]  [place] [address]  Phone: [phone]  Fax: [phone]  Follow Up Time:     Eulalio Allison  Pulmonary Disease  [address] [facility], [address]  [place] [address]  Phone: [phone]  Fax: [phone]  Follow Up Time:     Moreno Panchal  Endocrinology/Metab/Diabetes  [address] [facility], [address]  [place] [address]  Phone: [phone]  Fax: [phone]  Follow Up Time:     Bon Samuels  Internal Medicine  [address] [facility], [facility] [address]  [place] [address]  Phone: [phone]  Fax: [phone]  Follow Up Time:    Rico Glover  Cardiology  [address] [address] [place] [address], [address]  [place] [address]  Phone: [phone]  Fax: [phone]  Follow Up Time: 1-3 days    Eulalio Allison  Pulmonary Disease  [address] [facility], [address]  [place] [address]  Phone: [phone]  Fax: [phone]  Follow Up Time:     Moreno Panchal  Endocrinology/Metab/Diabetes  [address] [facility], [address]  [place] [address]  Phone: [phone]  Fax: [phone]  Follow Up Time:     Bon Samuels  Internal Medicine  [address] [facility], [address]  [place] [address]  Phone: [phone]  Fax: [phone]  Follow Up Time:     Chava Gonsalves  Thoracic and Cardiac Surgery  [address] [place] [address]  Phone: [phone]  Fax: [phone]  Follow Up Time: 1-3 days

## 2023-09-16 NOTE — DISCHARGE NOTE PROVIDER - HOSPITAL COURSE
72 y.o. F with PMH of asthma on chronic steroids, bronchiectasis, allergic rhinitis,  recurrent PNA,  tracheomalacia s/p tracheoplasty, ESBL proteus infections (sputum),  diabetes, paroxysmal A-fib on coumadin, colorectal cancer many years ago status post colostomy, recent hospitalization at an outside hospital  for acute respiratory failure with hypoxia secondary to asthma/COPD exacerbation and bronchopneumonia 6/5/2023 - 6/16/2023), and AVR 2022 with Dr. Cabrera.  Pt admits to chronic SOB and cough for years and is up to date on vaccines. Dr. Allison follows as an outpatient for her COPD. s/p 9/6 TAVR- [facility] with Dr. Gonsalves and Dr. Leahy. discharged home 9/10. Pt presents to office today for f/u visit  w/ c/o of worsening SOB. Pt to be admitted for further workup and eval. Pt stable at this time. VSS; O2 sats 97% RA.  ,H/o  ,bronchiectasis, tracheomalacia s/p tracheoplasty who presents after a recent admission for bronchiectasis exacerbation (6/2023, treated with Ertapenem for ESBL proteus), and again in 9/2023 for an acute flare whose hospital course was complicated by identification of severe AS requiring valve in valve TAVR 9/7/23    bronchiectasis  9/13 Pulm eval /rec Routine sputum culture, urine strep/legionella, RVP, COVID  3 sputum AFB   Considering culture history would start ertapenem and vancomycin for MRSA   Bronchodilator: Standing duonebs q 4 hours, nebulized pulmicort  - Continue steroids at 16 mg for now  - Ensure she is getting twice per day airway clearance: Albuterol nebulizer followed by saline nebulizer followed by cough assist device and encouragement to cough and clear  - Pulmonary will continue to follow    F/u  cultures, ambulation.  Coumadin, h/o paf  D/c planning when stable   72 y.o. F with PMH of asthma on chronic steroids, bronchiectasis, allergic rhinitis,  recurrent PNA,  tracheomalacia s/p tracheoplasty, ESBL proteus infections (sputum),  diabetes, paroxysmal A-fib on coumadin, colorectal cancer many years ago status post colostomy, recent hospitalization at an outside hospital  for acute respiratory failure with hypoxia secondary to asthma/COPD exacerbation and bronchopneumonia 6/5/2023 - 6/16/2023), and AVR 2022 with Dr. Cabrera.  Pt admits to chronic SOB and cough for years and is up to date on vaccines. Dr. Allison follows as an outpatient for her COPD. s/p 9/6 TAVR- [facility] with Dr. Gonsalves and Dr. Leahy. discharged home 9/10. Pt presents to office today for f/u visit  w/ c/o of worsening SOB. Pt to be admitted for further workup and eval. Pt stable at this time. VSS; O2 sats 97% RA.  ,H/o  ,bronchiectasis, tracheomalacia s/p tracheoplasty who presents after a recent admission for bronchiectasis exacerbation (6/2023, treated with Ertapenem for ESBL proteus), and again in 9/2023 for an acute flare whose hospital course was complicated by identification of severe AS requiring valve in valve TAVR 9/7/23    bronchiectasis  9/13 Pulm eval /rec Routine sputum culture, urine strep/legionella, RVP, COVID   sputum AFB -> negative x1   Considering culture (sputum cx : proteus Mirabilis ESBL) would start ertapenem and vancomycin for MRSA-> completed 5 days    Bronchodilator: Standing duonebs q 4 hours, nebulized pulmicort  - Continue steroids at 16 mg for now  - Ensure she is getting twice per day airway clearance: Albuterol nebulizer followed by saline nebulizer followed by cough assist device and encouragement to cough and clear    shingles  - valcyte for x7 days     Uncontrolled type 2 diabetes mellitus with hyperglycemia.   -restart Lantus 32 units sq hs   - restart  Admelog 15-16-18 units qac   Hold if NPO or eating less than 50% of meals.  -  moderate correction scale ac meals and moderate correction hs and 2am.      Plan to d/c on basal bolus????______________*****   Outpt. endo follow-up. Dr Saavedra     72 y.o. F with PMH of asthma on chronic steroids, bronchiectasis, allergic rhinitis,  recurrent PNA,  tracheomalacia s/p tracheoplasty, ESBL proteus infections (sputum),  diabetes, paroxysmal A-fib on coumadin, colorectal cancer many years ago status post colostomy, recent hospitalization at an outside hospital  for acute respiratory failure with hypoxia secondary to asthma/COPD exacerbation and bronchopneumonia 6/5/2023 - 6/16/2023), and AVR 2022 with Dr. Cabrera.  Pt admits to chronic SOB and cough for years and is up to date on vaccines. Dr. Allison follows as an outpatient for her COPD. s/p 9/6 TAVR- [facility] with Dr. Gonsalves and Dr. Leahy. discharged home 9/10. Pt presents to office today for f/u visit  w/ c/o of worsening SOB. Pt to be admitted for further workup and eval. Pt stable at this time. VSS; O2 sats 97% RA.  ,H/o  ,bronchiectasis, tracheomalacia s/p tracheoplasty who presents after a recent admission for bronchiectasis exacerbation (6/2023, treated with Ertapenem for ESBL proteus), and again in 9/2023 for an acute flare whose hospital course was complicated by identification of severe AS requiring valve in valve TAVR 9/7/23    Bronchiectasis  9/13 Pulm eval /rec Routine sputum culture, urine strep/legionella, RVP, COVID   sputum AFB -> negative x1   Considering culture (sputum cx : proteus Mirabilis ESBL) would start ertapenem and vancomycin for MRSA-> completed 5 days    Bronchodilator: Standing duonebs q 4 hours, nebulized pulmicort  - Continue steroids at 16 mg for now  - Ensure she is getting twice per day airway clearance: Albuterol nebulizer followed by saline nebulizer followed by cough assist device and encouragement to cough and clear    Shingles  - valtrex for x7 days     Uncontrolled type 2 diabetes mellitus with hyperglycemia.   -restart Lantus 32 units sq hs   - restart  Admelog 15-16-18 units qac   Hold if NPO or eating less than 50% of meals.  -  moderate correction scale ac meals and moderate correction hs and 2am.      Plan to d/c on basal bolus????______________*****   Outpt. endo follow-up. Dr Saavedra     72 y.o. F with PMH of asthma on chronic steroids, bronchiectasis, allergic rhinitis,  recurrent PNA,  tracheomalacia s/p tracheoplasty, ESBL proteus infections (sputum),  diabetes, paroxysmal A-fib on coumadin, colorectal cancer many years ago status post colostomy, recent hospitalization at an outside hospital  for acute respiratory failure with hypoxia secondary to asthma/COPD exacerbation and bronchopneumonia 6/5/2023 - 6/16/2023), and AVR 2022 with Dr. Cabrera.  Pt admits to chronic SOB and cough for years and is up to date on vaccines. Dr. Allison follows as an outpatient for her COPD. s/p 9/6 TAVR- [facility] with Dr. Gonsalves and Dr. Leahy. discharged home 9/10. Pt presents to office today for f/u visit  w/ c/o of worsening SOB. Pt to be admitted for further workup and eval. Pt stable at this time. VSS; O2 sats 97% RA.  ,H/o  ,bronchiectasis, tracheomalacia s/p tracheoplasty who presents after a recent admission for bronchiectasis exacerbation (6/2023, treated with Ertapenem for ESBL proteus), and again in 9/2023 for an acute flare whose hospital course was complicated by identification of severe AS requiring valve in valve TAVR 9/7/23    Bronchiectasis  9/13 Pulmonary evaluation -  recommends routine sputum culture, urine strep/legionella, RVP, COVID   sputum AFB -> negative x1   Considering culture (sputum cx : proteus Mirabilis ESBL) would start ertapenem and vancomycin for MRSA-> completed 5 days    Bronchodilator: Standing duonebs q 4 hours, nebulized pulmicort  - Continue steroids at 16 mg for now  - Ensure she is getting twice per day airway clearance: Albuterol nebulizer followed by saline nebulizer followed by cough assist device and encouragement to cough and clear    Shingles  - valtrex for x7 days     Uncontrolled type 2 diabetes mellitus with hyperglycemia.   -restart Lantus 32 units sq hs   - restart  Admelog 15-16-18 units qac   Hold if NPO or eating less than 50% of meals.  -  moderate correction scale ac meals and moderate correction hs and 2am.      Plan to d/c on basal bolus????______________*****   Outpt. endo follow-up. Dr Saavedra     72 y.o. F with PMH of asthma on chronic steroids, bronchiectasis, allergic rhinitis,  recurrent PNA,  tracheomalacia s/p tracheoplasty, ESBL proteus infections (sputum),  diabetes, paroxysmal A-fib on coumadin, colorectal cancer many years ago status post colostomy, recent hospitalization at an outside hospital  for acute respiratory failure with hypoxia secondary to asthma/COPD exacerbation and bronchopneumonia 6/5/2023 - 6/16/2023), and AVR 2022 with Dr. Cabrera.  Pt admits to chronic SOB and cough for years and is up to date on vaccines. Dr. Allison follows as an outpatient for her COPD. s/p 9/6 TAVR- [facility] with Dr. Gonsalves and Dr. Leahy. discharged home 9/10. Pt presents to office today for f/u visit  w/ c/o of worsening SOB. Pt to be admitted for further workup and eval. Pt stable at this time. VSS; O2 sats 97% RA.  ,H/o  ,bronchiectasis, tracheomalacia s/p tracheoplasty who presents after a recent admission for bronchiectasis exacerbation (6/2023, treated with Ertapenem for ESBL proteus), and again in 9/2023 for an acute flare whose hospital course was complicated by identification of severe AS requiring valve in valve TAVR 9/7/23    Bronchiectasis  9/13 Pulmonary evaluation    Patient received ertapenem and vancomycin for MRSA-> completed 5 days. She received standing duonebs q 4 hours, nebulized pulmicort.  Will continue on steroid taper at home.     Shingles  - Received valtrex for x7 days.    Uncontrolled type 2 diabetes mellitus with hyperglycemia.   -restart Lantus/Basaglar 18 units sq hs   - restart  Admelog 15 units qac    Follow-up with Dr Saavedra.     72 y.o. F with PMH of asthma on chronic steroids, bronchiectasis, allergic rhinitis,  recurrent PNA,  tracheomalacia s/p tracheoplasty, ESBL proteus infections (sputum),  diabetes, paroxysmal A-fib on coumadin, colorectal cancer many years ago status post colostomy, recent hospitalization at an outside hospital  for acute respiratory failure with hypoxia secondary to asthma/COPD exacerbation and bronchopneumonia 6/5/2023 - 6/16/2023), and AVR 2022 with Dr. Cabrera.  Pt admits to chronic SOB and cough for years and is up to date on vaccines. Dr. Allison follows as an outpatient for her COPD. s/p 9/6 TAVR- [facility] with Dr. Gonsalves and Dr. Leahy. discharged home 9/10. Pt presents to office today for f/u visit  w/ c/o of worsening SOB. Pt to be admitted for further workup and eval. Pt stable at this time. VSS; O2 sats 97% RA.  ,H/o  ,bronchiectasis, tracheomalacia s/p tracheoplasty who presents after a recent admission for bronchiectasis exacerbation (6/2023, treated with Ertapenem for ESBL proteus), and again in 9/2023 for an acute flare whose hospital course was complicated by identification of severe AS requiring valve in valve TAVR 9/7/23    Bronchiectasis  9/13 Pulmonary evaluation    Patient received ertapenem and vancomycin for MRSA-> completed 5 days. She received standing duonebs q 4 hours, nebulized pulmicort.  Will continue on steroid taper at home.     Shingles  - Received valtrex for x7 days.    Uncontrolled type 2 diabetes mellitus with hyperglycemia.   -restart Lantus/Basaglar 18 units sq hs   - restart  Admelog 15 units qac    Follow-up with Dr Saavedra.    Covid Infection    72 y.o. F with PMH of asthma on chronic steroids, bronchiectasis, allergic rhinitis,  recurrent PNA,  tracheomalacia s/p tracheoplasty, ESBL proteus infections (sputum),  diabetes, paroxysmal A-fib on coumadin, colorectal cancer many years ago status post colostomy, recent hospitalization at an outside hospital  for acute respiratory failure with hypoxia secondary to asthma/COPD exacerbation and bronchopneumonia 6/5/2023 - 6/16/2023), and AVR 2022 with Dr. Cabrera.  Pt admits to chronic SOB and cough for years and is up to date on vaccines. Dr. Allison follows as an outpatient for her COPD. s/p 9/6 TAVR- [facility] with Dr. Gonsalves and Dr. Leahy. discharged home 9/10. Pt presents to office today for f/u visit  w/ c/o of worsening SOB. Pt to be admitted for further workup and eval. Pt stable at this time. VSS; O2 sats 97% RA.  ,H/o  ,bronchiectasis, tracheomalacia s/p tracheoplasty who presents after a recent admission for bronchiectasis exacerbation (6/2023, treated with Ertapenem for ESBL proteus), and again in 9/2023 for an acute flare whose hospital course was complicated by identification of severe AS requiring valve in valve TAVR 9/7/23    New Medications     Steroid taper     Insulin adjusted     COUMADIN new ( previously never had at home )     Bronchiectasis  9/13 Pulmonary evaluation    Patient received ertapenem and vancomycin for MRSA-> completed 5 days. She received standing duonebs q 4 hours, nebulized pulmicort.  Will continue on steroid taper at home.     Shingles  - Received valtrex for x7 days.    Uncontrolled type 2 diabetes mellitus with hyperglycemia.   -restart Lantus/Basaglar 18 units sq hs   - restart  Admelog 15 units qac    Follow-up with Dr Saavedra.    Covid Infection

## 2023-09-16 NOTE — DISCHARGE NOTE PROVIDER - PROVIDER TOKENS
PROVIDER:[TOKEN:[[id number]]],PROVIDER:[TOKEN:[[id number]]] PROVIDER:[TOKEN:[[id number]]],PROVIDER:[TOKEN:[[id number]]],PROVIDER:[TOKEN:[[id number]]],PROVIDER:[TOKEN:[[id number]]] PROVIDER:[TOKEN:[[id number]],FOLLOWUP:[1-3 days]],PROVIDER:[TOKEN:[[id number]]],PROVIDER:[TOKEN:[[id number]]],PROVIDER:[TOKEN:[[id number]]],PROVIDER:[TOKEN:[[id number]],FOLLOWUP:[1-3 days]]

## 2023-09-16 NOTE — DISCHARGE NOTE PROVIDER - DISCHARGE DATE
This rehab services evaluation was originally performed by Matilda Moreno on 1/8/23. This writer is pulling this note forward from a pended chart for staff reference and continuation of care plan. The patients status has not changed and does not need a re-evaluation.    Salma Dao, OT  Occupational Therapist  Occupational Therapy  Progress Notes     Cosign Needed  Date of Service:  1/8/2023 11:45 AM  Creation Time:  1/9/2023 11:45 AM          01/08/23 0716   Appointment Info   Signing Clinician's Name / Credentials (OT) Matilda Moreno OTRL   Living Environment   People in Home child(lynette), adult  (2 adult sons, one works from home and helps pt with ADL/IADL prn.)   Current Living Arrangements house   Transportation Anticipated family or friend will provide;agency   Living Environment Comments PT: Pt lives with his two sons in [place], [place], Two platform stairs on the front of the house with no railing. Ramp on the garage access. Pt explains that he has been in and out of hospital and rehabs since initial cervical surgery in June 2022. Prior to this pt was moving around IND with walker in home. Pt reports nearly 5 years on dialysis.  He has a tub/shower combo with hand held shower and bench.  Pt has a standard height toilet and reports they may have a RTS to use a home.   Self-Care   Usual Activity Tolerance moderate   Current Activity Tolerance fair   Equipment Currently Used at Home wheelchair, manual   Fall history within last six months yes   Activity/Exercise/Self-Care Comment Per acute care therapy, pt reports son would assist with ADL prn but pt was mod I for transfers and short distance mobility with FWW. W/c for long distances   Instrumental Activities of Daily Living (IADL)   IADL Comments Son assists prn   General Information   Onset of Illness/Injury or Date of Surgery 12/06/22   Referring Physician Светлана Rios MD   Patient/Family Therapy Goal Statement (OT) To Walk   Additional Occupational  Profile Info/Pertinent History of Current Problem Shay Jimenez is a 56 year old male with with PMH ESRD on dialysis and osteomyelitis s/p C5-T6 fusion and recent s/p redo C5-T6 fusion   Existing Precautions/Restrictions spinal  ( when OOB, okay to shower - pat dry)   Left Upper Extremity (Weight-bearing Status) partial weight-bearing (PWB)  (10# lifting restriction x4 weeks, until cleared in the clinic)   Right Upper Extremity (Weight-bearing Status) partial weight-bearing (PWB)  (10# lifting restriction x4 weeks, until cleared in the clinic)   Cognitive Status Examination   Orientation Status orientation to person, place and time   Cognitive Status Comments SLUMS in acute care on 12/21 was BNLs scoring 10/30.  OT will monitor, assess and tx as appropriate.   Visual Perception   Visual Impairment/Limitations corrective lenses for reading   Sensory   Sensory Comments H/o poor sensation in B feet and ankles per pt.   Pain Assessment   Patient Currently in Pain No   Strength Comprehensive (MMT)   General Manual Muscle Testing (MMT) Assessment upper extremity strength deficits identified   Comment, General Manual Muscle Testing (MMT) Assessment pt generally weak, shoulders and kartik hands (h/o fall and surgery to L wrist over the past year).  Pt requested 2# dumbbell, basic ex reviewed and pt demo/verb understanding.   Upper Extremity (Manual Muscle Testing)   Comment, MMT: Upper Extremity Generally weak.  Pt identifies core weakness as well.   Coordination   Coordination Comments WFLs per observation.   Bed Mobility   Comment (Bed Mobility) SBA with HOB raised.  Pt feels his supine to sit is within his 10# precautions.  To get back into bed, pt needs assist with BLE's due to weakness.   Transfers   Transfer Comments NT, pt has been using Irina Stedy.   Activities of Daily Living   BADL Assessment/Intervention upper body dressing;lower body dressing;grooming   Upper Body Dressing Assessment/Training   Comment,  (Upper Body Dressing) Set-up   Lower Body Dressing Assessment/Training   Comment, (Lower Body Dressing) Max A to thread clothing, pt does not want to work on LB dressing/use of AE and reports his sons will assist him.   Pt does want to be IND managing clothing for toileting.  He reports that he is currently having diarrhea without warning.   Grooming Assessment/Training   Comment, (Grooming) Set-up   Clinical Impression   Criteria for Skilled Therapeutic Interventions Met (OT) Yes, treatment indicated   OT Diagnosis Decrease ADL, Decrease transfers, Decrease IADL   OT Problem List-Impairments impacting ADL problems related to;activity tolerance impaired;balance;flexibility;mobility;strength;sensation   Assessment of Occupational Performance 3-5 Performance Deficits   Identified Performance Deficits gr/hyg, dressing, transfers, IADL   Planned Therapy Interventions (OT) ADL retraining;IADL retraining;balance training;bed mobility training;strengthening;transfer training;progressive activity/exercise   Clinical Decision Making Complexity (OT) low complexity   Anticipated Equipment Needs Upon Discharge (OT)    (Monitor for AE needs in the bathroom.  Pt not interested in LB dressing AE.)   Risk & Benefits of therapy have been explained evaluation/treatment results reviewed;care plan/treatment goals reviewed;risks/benefits reviewed;current/potential barriers reviewed;participants voiced agreement with care plan;participants included;patient   Clinical Impression Comments Pt admitted s/p C5-T6 fusion and recent s/p redo C5-T6 fusion.  He reports his LEs are weak and has h/o feeling loss in ankles and feet. Pt went through rehab a couple times in recent past and reports he does not want to work on LB dressing with AE as his sons will assist him.  He agreed to all other aspects of the OT POC.   OT Total Evaluation Time   OT Eval, Low Complexity Minutes (08703) 15   Therapy Certification   Start of Care Date 01/08/23    Certification date from 01/08/23   Certification date to 02/06/23   OT Goals   Therapy Frequency (OT) 6 times/wk   OT Predicted Duration/Target Date for Goal Attainment 01/26/23   OT Goals Hygiene/Grooming;Upper Body Dressing;Lower Body Dressing;Upper Body Bathing;Lower Body Bathing;Bed Mobility;Transfers;Toilet Transfer/Toileting;Meal Preparation;Cognition   OT: Hygiene/Grooming modified independent;while standing;within precautions   OT: Upper Body Dressing Modified independent;including set-up/clothing retrieval;within precautions   OT: Lower Body Dressing Modified independent;including set-up/clothing retrieval;within precautions  (Pt wants to con't assist of sons for shoes and socks but wants to manage clothing for toileting mod I.)   OT: Upper Body Bathing Modified independent;within precautions   OT: Lower Body Bathing Supervision/stand-by assist;with precautions   OT: Bed Mobility Modified independent;within precautions   OT: Transfer Supervision/stand-by assist;within precautions  (shower tranfer)   OT: Toilet Transfer/Toileting Modified independent;using adaptive equipment;within precautions   OT: Meal Preparation Supervision/stand-by assist;with simple meal preparation;within precautions;ambulatory level   OT: Cognitive Patient/caregiver will verbalize understanding of cognitive assessment results/recommendations as needed for safe discharge planning   Self-Care/Home Management   Self-Care/Home Mgmt/ADL, Compensatory, Meal Prep Minutes (91365) 25   Treatment Detail/Skilled Intervention OT: Th assists pt to don his .  Pt able to roll SBA.  Supine to sit SBA with HOB raised.  Pt able to help scoot to HOB in sitting, cues to con't following his precautions.  Min A sit to supine and min A supine scoot with bed control assist. Pt requests to not work on LB dressing, sons will do his shoes and socks but he would like ot manage lower body clothing during toileting.   OT Discharge Planning   OT Plan OT:  spinal prec,  OOB, incontinence, h/o decrease sensation B ankles and feet, h/o cog deficit after surgery, okay to shower - pat dry.  Tx: sponge bath, pt not feeling ready for a shower, gg bathing score, dressing (pt will have assist of sons for socks and shoes but wants to manage clothing during toileting). Transfers with Irina Trentondy prn, progress to standing at the sink.   OT Discharge Recommendation (DC Rec) home with assist;home with home care occupational therapy   OT Brief overview of current status See clinical impression above for eval status.   Total Session Time   Timed Code Treatment Minutes 25   Total Session Time (sum of timed and untimed services) 40   Post Acute Settings Only   What unit is patient on? TCU   Upper Body Dressing   Describe Performance OT: Set-up   Lower Body Dressing (Pants/Undergarments)   Describe Performance OT: Dependent. Pt would like to be able to manage clothing during toileting.   Lower Body Dressing (Putting On/Taking-Off Footwear)   Describe Performance OT: dependent, pt has assist of sons   Toileting Hygiene   Describe Performance OT: Dependent per pt   Transfers: Toilet transfers   Describe Performance OT: NT, pt has not been on the toilet.  He has incontinence.  Th instructs pt how he can use Irina Madera to get to the toilet.   Hygiene/Grooming   Describe Performance OT: set-up   Oral Hygiene   Describe Performance OT: set-up                 29-Sep-2023 30-Sep-2023 01-Oct-2023

## 2023-09-16 NOTE — DISCHARGE NOTE PROVIDER - CARE PROVIDERS DIRECT ADDRESSES
,[email or web address] and [email or web address] and [email or web address]

## 2023-09-16 NOTE — DISCHARGE NOTE PROVIDER - NSDCFUADDINST_GEN_ALL_CORE_FT
Wound care:  Impression  bilateral sacrum / buttocks deep tissue injury present on admission  right elbow scar tissue   heels / feet podiatry  lesions  lower back      Recommendations  1. Bilateral  , sacral / buttocks  deep tissue injury    Topical therapy- sacral/bilateral buttocks- cleanse w/incontinent cleanser, pat dry & apply justyn moisture barrier cream twice daily & prn soiling .  monitor    for changes .   2. Elevate heels; apply Complete Cair air fluidized boots; ensure that the soles of the feet are not resting on the foot board of the bed.  podiatry to consulted follow there management    3.  Incontinent management - incontinent cleanser, pads,  timi care  BID  4. Maintain on an alternating air with low air loss surface   5. Turn & reposition every 2 hr; Use positioning pillow to turn and reposition, soft pillow between bony prominences; continue measures to decrease friction/shear/pressure.  6. Nutrition optimization.  7 bilateral elbow cleans with skin cleanser pat dry  apply "Sween 24 once a day moisturizer" daily  8.  waffle cushion when out of bed to chair

## 2023-09-16 NOTE — PROGRESS NOTE ADULT - SUBJECTIVE AND OBJECTIVE BOX
Diabetes Follow up note:    Chief complaint: T2DM w/steroids induced hyperglycemia    Interval Hx: BG values 80-300s over past 24 hours. Pt seen at bedside. Reports tolerating POs w/good appetite. Insulin held at lunch yesterday w/rebound hyperglycemia. Pt w/dropping glucose overnight, had to eat snack to prevent further drop>leading to glucose in 200s this AM. On chronic medrol 16mg daily.     Review of Systems:  General: + discomfort from shingles.   GI: Tolerating POs. Denies N/V/D/Abd pain  CV: Denies CP/SOB  ENDO: No S&Sx of hypoglycemia    MEDS:  atorvastatin 20 milliGRAM(s) Oral at bedtime  insulin glargine Injectable (LANTUS) 22 Unit(s) SubCutaneous at bedtime  insulin lispro (ADMELOG) corrective regimen sliding scale   SubCutaneous <User Schedule>  insulin lispro (ADMELOG) corrective regimen sliding scale   SubCutaneous three times a day before meals  insulin lispro Injectable (ADMELOG) 9 Unit(s) SubCutaneous three times a day before meals  methylPREDNISolone 16 milliGRAM(s) Oral daily    azithromycin  IVPB 500 milliGRAM(s) IV Intermittent every 24 hours  azithromycin  IVPB      valACYclovir 1000 milliGRAM(s) Oral three times a day    Allergies    tetanus toxoid (Short breath)  Dilaudid (Short breath)  codeine (Short breath)  iodine (Short breath; Swelling)  Avelox (Short breath; Pruritus)  shellfish (Anaphylaxis)  cefepime (Anaphylaxis)  aspirin (Short breath)  Valium (Short breath)  penicillin (Anaphylaxis)        PE:  General: Female lying in bed. NAD.   Vital Signs Last 24 Hrs  T(C): 36.6 (16 Sep 2023 04:14), Max: 36.8 (15 Sep 2023 21:31)  T(F): 97.9 (16 Sep 2023 04:14), Max: 98.3 (15 Sep 2023 21:31)  HR: 89 (16 Sep 2023 04:14) (84 - 89)  BP: 107/62 (16 Sep 2023 04:14) (107/62 - 128/70)  BP(mean): --  RR: 18 (16 Sep 2023 04:14) (18 - 18)  SpO2: 97% (16 Sep 2023 04:14) (97% - 99%)    Parameters below as of 16 Sep 2023 04:14  Patient On (Oxygen Delivery Method): room air      Abd: Soft, NT,ND, Central adiposity.   Extremities: Warm  Neuro: A&O X3    LABS:  POCT Blood Glucose.: 215 mg/dL (09-16-23 @ 08:15)  POCT Blood Glucose.: 95 mg/dL (09-16-23 @ 02:08)  POCT Blood Glucose.: 139 mg/dL (09-15-23 @ 21:24)  POCT Blood Glucose.: 328 mg/dL (09-15-23 @ 17:23)  POCT Blood Glucose.: 86 mg/dL (09-15-23 @ 12:35)  POCT Blood Glucose.: 103 mg/dL (09-15-23 @ 08:49)  POCT Blood Glucose.: 84 mg/dL (09-15-23 @ 07:49)  POCT Blood Glucose.: 183 mg/dL (09-14-23 @ 20:10)  POCT Blood Glucose.: 91 mg/dL (09-14-23 @ 17:52)  POCT Blood Glucose.: 129 mg/dL (09-14-23 @ 12:24)  POCT Blood Glucose.: 74 mg/dL (09-14-23 @ 12:01)  POCT Blood Glucose.: 176 mg/dL (09-14-23 @ 08:38)  POCT Blood Glucose.: 123 mg/dL (09-13-23 @ 21:34)  POCT Blood Glucose.: 304 mg/dL (09-13-23 @ 16:33)  POCT Blood Glucose.: 79 mg/dL (09-13-23 @ 12:33)                            9.8    8.28  )-----------( 249      ( 15 Sep 2023 06:44 )             34.5       09-15    144  |  105  |  13  ----------------------------<  48<LL>  4.3   |  23  |  0.72    eGFR: 88    Ca    9.1      09-15    TPro  5.9<L>  /  Alb  3.7  /  TBili  0.3  /  DBili  x   /  AST  13  /  ALT  15  /  AlkPhos  71  09-14      Thyroid Function Tests:  09-06 @ 09:32 TSH 0.49 FreeT4 1.2 T3 82 Anti TPO -- Anti Thyroglobulin Ab -- TSI --      A1C with Estimated Average Glucose Result: 5.7 % (09-08-23 @ 06:39)  A1C with Estimated Average Glucose Result: 9.1 % (06-17-23 @ 10:27)  A1C with Estimated Average Glucose Result: 9.4 % (05-12-23 @ 07:40)  A1C with Estimated Average Glucose Result: 10.3 % (03-05-23 @ 06:35)  A1C with Estimated Average Glucose Result: 8.6 % (12-15-22 @ 06:19)          Contact number: karoline [phone] or [phone]

## 2023-09-16 NOTE — DISCHARGE NOTE PROVIDER - NSDCFUADDAPPT_GEN_ALL_CORE_FT
APPTS ARE READY TO BE MADE: [X] YES    Best Family or Patient Contact (if needed):    Additional Information about above appointments (if needed):    1:   2:   3:     Other comments or requests:    APPTS ARE READY TO BE MADE: [X] YES    Best Family or Patient Contact (if needed):    Additional Information about above appointments (if needed):    1:   2:   3:     Other comments or requests:       Patient was previously scheduled for an appointment on  10/26 2pm at [address] [facility] with Dr. Panchal.  Patient was scheduled for an appointment on  10/12 10:30am at [address] with Dr. Glover. Patient/Caregiver was advised of appointment details.    Patient was scheduled for an appointment on  10/16 2pm at [address] [facility] with . Patient/Caregiver was advised of appointment details.    Provider Dr. Allison office was contacted to secure an appointment, however the office will follow up with the patient/caregiver directly.  APPTS ARE READY TO BE MADE: [X] YES    Best Family or Patient Contact (if needed):    Additional Information about above appointments (if needed):    1:  2:  3:     Other comments or requests:       Patient was previously scheduled for an appointment on  10/26 2pm at [address] [facility] with Dr. Panchal.  Patient was scheduled for an appointment on  10/12 10:30am at [address] with Dr. Glover. Patient/Caregiver was advised of appointment details.    Patient was scheduled for an appointment on  10/16 2pm at [address] [facility] with . Patient/Caregiver was advised of appointment details.    Provider Dr. Allison office was contacted to secure an appointment, however the office will follow up with the patient/caregiver directly.  APPTS ARE READY TO BE MADE: [X] YES    Best Family or Patient Contact (if needed):    Additional Information about above appointments (if needed):    1: Dr. Gonsalves - Cardiology  2:  3:     Other comments or requests:       Patient was previously scheduled for an appointment on  10/26 2pm at [address] [facility] with Dr. Panchal.  Patient was scheduled for an appointment on  10/12 10:30am at [address] with Dr. Glover. Patient/Caregiver was advised of appointment details.    Patient was scheduled for an appointment on  10/16 2pm at [address] [facility] with . Patient/Caregiver was advised of appointment details.    Provider Dr. Allison office was contacted to secure an appointment, however the office will follow up with the patient/caregiver directly.  APPTS ARE READY TO BE MADE: [X] YES    Best Family or Patient Contact (if needed):    Additional Information about above appointments (if needed):    1: Dr. Gonsalves - Cardiology  2: Dr. Glover ** coumadin management   3:     Other comments or requests:       Patient was previously scheduled for an appointment on  10/26 2pm at [address] [facility] with Dr. Panchal.  Patient was scheduled for an appointment on  10/12 10:30am at [address] with Dr. Glover. Patient/Caregiver was advised of appointment details.    Patient was scheduled for an appointment on  10/16 2pm at [address] [facility] with . Patient/Caregiver was advised of appointment details.    Provider Dr. Alilson office was contacted to secure an appointment, however the office will follow up with the patient/caregiver directly.  APPTS ARE READY TO BE MADE: [X] YES    Best Family or Patient Contact (if needed):    Additional Information about above appointments (if needed):    1: Dr. Gonsalves - Cardiology  2: Dr. Glover ** coumadin management   3:     Other comments or requests:       Patient was previously scheduled for an appointment on  10/26 2pm at [address] [facility] with Dr. Panchal.  Patient was scheduled for an appointment on  10/12 10:30am at [address] with Dr. Glover. Patient/Caregiver was advised of appointment details.    Patient was scheduled for an appointment on  10/16 2pm at [address] [facility] with . Patient/Caregiver was advised of appointment details.    Patient was scheduled for an appointment on  10/10 9am at [address] [facility] with Dr Gonsalves. Patient/Caregiver was advised of appointment details.    Provider Dr. Allison office was contacted to secure an appointment, however the office will follow up with the patient/caregiver directly.

## 2023-09-16 NOTE — DISCHARGE NOTE PROVIDER - NSDCFUSCHEDAPPT_GEN_ALL_CORE_FT
[facility]  ENDOCRIN [address] [facility] [address]  Scheduled Appointment: 10/26/2023     Rico Glover  [facility]  CARDIOLOGY [address]  Scheduled Appointment: 10/12/2023    [place]  [facility]  [address]MED  [address]  Scheduled Appointment: 10/16/2023    Moreno Panchal  [facility]  END[address]CRIN [address]  Scheduled Appointment: 10/26/2023     Chava Gonsalves  [facility]  CTSURG [address]  Scheduled Appointment: 10/10/2023    Rico Glover  [facility]  CARDIOLOGY [address]  Scheduled Appointment: 10/12/2023    [place]  [facility]  [address]MED  [address]  Scheduled Appointment: 10/16/2023    Moreno Panchal  [facility]  END[address]CRIN [address]  Scheduled Appointment: 10/26/2023    [facility]  [address]MMED [address]  Scheduled Appointment: 11/03/2023    [facility]  [address]MMED [address]  Scheduled Appointment: 12/12/2023    Eulalio Allison  [facility]  [address]ED [address]  Scheduled Appointment: 12/12/2023

## 2023-09-16 NOTE — DISCHARGE NOTE PROVIDER - NPI NUMBER (FOR SYSADMIN USE ONLY) :
[[id number]],[[id number] [[id number]],[[id number]],[[id number] [[id number]],[[id number]],[[id number]]

## 2023-09-16 NOTE — PROGRESS NOTE ADULT - SUBJECTIVE AND OBJECTIVE BOX
DATE OF SERVICE: 09-16-23 @ 08:23    Patient is a 72y old  Female who presents with a chief complaint of Heart failure  Per chart, "72 y.o. F with PMH of asthma on chronic steroids, bronchiectasis, allergic rhinitis,  recurrent PNA,  tracheomalacia s/p tracheoplasty, ESBL proteus infections (sputum),  diabetes, paroxysmal A-fib on coumadin, colorectal cancer many years ago status post colostomy, recent hospitalization at an outside hospital  for acute respiratory failure with hypoxia secondary to asthma/COPD exacerbation and bronchopneumonia 6/5/2023 - 6/16/2023), and AVR 2022 with Dr. Cabrera.  Pt admits to chronic SOB and cough for years and is up to date on vaccines. Dr. Allison follows as an outpatient for her COPD. s/p 9/6 TAVR- [facility] with Dr. Gonsalves and Dr. Leahy. discharged home 9/10. Pt presents to office today for f/u visit  w/ c/o of worsening SOB. Pt to be admitted for further workup and eval. Pt stable at this time."       (15 Sep 2023 17:34)      SUBJECTIVE / OVERNIGHT EVENTS:  c/o sob and khan and cough    MEDICATIONS  (STANDING):  albuterol/ipratropium for Nebulization 3 milliLiter(s) Nebulizer every 6 hours  ascorbic acid 500 milliGRAM(s) Oral daily  atorvastatin 20 milliGRAM(s) Oral at bedtime  azithromycin  IVPB 500 milliGRAM(s) IV Intermittent every 24 hours  azithromycin  IVPB      budesonide  80 MICROgram(s)/formoterol 4.5 MICROgram(s) Inhaler 2 Puff(s) Inhalation two times a day  chlorhexidine 2% Cloths 1 Application(s) Topical <User Schedule>  clopidogrel Tablet 75 milliGRAM(s) Oral daily  famotidine    Tablet 20 milliGRAM(s) Oral two times a day  gabapentin 100 milliGRAM(s) Oral every 8 hours  guaiFENesin  milliGRAM(s) Oral every 12 hours  hydrocortisone 1% Cream 1 Application(s) Topical two times a day  insulin glargine Injectable (LANTUS) 28 Unit(s) SubCutaneous at bedtime  insulin lispro (ADMELOG) corrective regimen sliding scale   SubCutaneous three times a day before meals  insulin lispro (ADMELOG) corrective regimen sliding scale   SubCutaneous <User Schedule>  insulin lispro Injectable (ADMELOG) 9 Unit(s) SubCutaneous three times a day before meals  lidocaine   4% Patch 2 Patch Transdermal daily  methylPREDNISolone 16 milliGRAM(s) Oral daily  mexiletine 200 milliGRAM(s) Oral every 8 hours  multivitamin 1 Tablet(s) Oral daily  polyethylene glycol 3350 17 Gram(s) Oral two times a day  polyethylene glycol 3350 17 Gram(s) Oral daily  senna 2 Tablet(s) Oral at bedtime  tiotropium 2.5 MICROgram(s) Inhaler 2 Puff(s) Inhalation daily  valACYclovir 1000 milliGRAM(s) Oral three times a day    MEDICATIONS  (PRN):  acetaminophen     Tablet .. 650 milliGRAM(s) Oral every 6 hours PRN Temp greater or equal to 38C (100.4F), Mild Pain (1 - 3)  simethicone 80 milliGRAM(s) Chew every 12 hours PRN Gas  traMADol 50 milliGRAM(s) Oral every 8 hours PRN Moderate Pain (4 - 6)      Vital Signs Last 24 Hrs  T(C): 36.6 (16 Sep 2023 04:14), Max: 36.8 (15 Sep 2023 21:31)  T(F): 97.9 (16 Sep 2023 04:14), Max: 98.3 (15 Sep 2023 21:31)  HR: 89 (16 Sep 2023 04:14) (84 - 89)  BP: 107/62 (16 Sep 2023 04:14) (107/62 - 128/70)  BP(mean): --  RR: 18 (16 Sep 2023 04:14) (18 - 18)  SpO2: 97% (16 Sep 2023 04:14) (97% - 99%)    Parameters below as of 16 Sep 2023 04:14  Patient On (Oxygen Delivery Method): room air      CAPILLARY BLOOD GLUCOSE      POCT Blood Glucose.: 215 mg/dL (16 Sep 2023 08:15)  POCT Blood Glucose.: 95 mg/dL (16 Sep 2023 02:08)  POCT Blood Glucose.: 139 mg/dL (15 Sep 2023 21:24)  POCT Blood Glucose.: 328 mg/dL (15 Sep 2023 17:23)  POCT Blood Glucose.: 86 mg/dL (15 Sep 2023 12:35)  POCT Blood Glucose.: 103 mg/dL (15 Sep 2023 08:49)    I&O's Summary    15 Sep 2023 07:01  -  16 Sep 2023 07:00  --------------------------------------------------------  IN: 630 mL / OUT: 0 mL / NET: 630 mL        PHYSICAL EXAM:  GENERAL: NAD, well-developed  HEAD:  Atraumatic, Normocephalic  EYES: EOMI, PERRLA, conjunctiva and sclera clear  NECK: Supple, No JVD  CHEST/LUNG: barry wheezing and thonchi  HEART: Regular rate and rhythm; No murmurs, rubs, or gallops  ABDOMEN: Soft, Nontender, Nondistended; Bowel sounds present  EXTREMITIES:  2+ Peripheral Pulses, No clubbing, cyanosis, or edema  PSYCH: AAOx3  NEUROLOGY: non-focal  SKIN: No rashes or lesions    LABS:                        9.8    8.28  )-----------( 249      ( 15 Sep 2023 06:44 )             34.5     09-15    144  |  105  |  13  ----------------------------<  48<LL>  4.3   |  23  |  0.72    Ca    9.1      15 Sep 2023 06:47    TPro  5.9<L>  /  Alb  3.7  /  TBili  0.3  /  DBili  x   /  AST  13  /  ALT  15  /  AlkPhos  71  09-14    PT/INR - ( 15 Sep 2023 06:54 )   PT: 13.3 sec;   INR: 1.22 ratio         PTT - ( 15 Sep 2023 06:54 )  PTT:29.6 sec      Urinalysis Basic - ( 15 Sep 2023 06:47 )    Color: x / Appearance: x / SG: x / pH: x  Gluc: 48 mg/dL / Ketone: x  / Bili: x / Urobili: x   Blood: x / Protein: x / Nitrite: x   Leuk Esterase: x / RBC: x / WBC x   Sq Epi: x / Non Sq Epi: x / Bacteria: x        RADIOLOGY & ADDITIONAL TESTS:    Imaging Personally Reviewed:    Consultant(s) Notes Reviewed:      Care Discussed with Consultants/Other Providers:

## 2023-09-16 NOTE — PROGRESS NOTE ADULT - PROBLEM SELECTOR PLAN 4
On chronic steroids at home > medrol 8mg qd from admission in 2022  Per pt she has been sick this year with multiple hospitalizations requiring pulse steroids. Was on Methylprednisolone 28mg PTA.   -Pt on slow titration back to Medrol 8mg qd  -Re-order MTP 16mg for 9/7> team aware   - Will need stress steroids if acutely ill or for OR.      Any inquiries [email or web address]  office:  [phone] (M-F 9a-5pm)               [phone] (nights/weekends)

## 2023-09-16 NOTE — DISCHARGE NOTE PROVIDER - NSDCCPCAREPLAN_GEN_ALL_CORE_FT
PRINCIPAL DISCHARGE DIAGNOSIS  Diagnosis: COPD exacerbation  Assessment and Plan of Treatment: Call your Health Care provider upon arrival home to make a follow up appointment within one week.  Take all inhalers as prescribed by your Health Care Provider.  Take steroids as prescribed by your Health Care Provider.  If your cough increases infrequency and severity and/or you have shortness of breath or increased shortness of breath call your Health Care Provider.  If you develop fever, chills, night sweats, malaise, and/or change in mental status call your Health care Provider.  Nutrition is very important.  Eat small frequent meals.  Increase your activity as tolerated.  Do not stay in bed all day        SECONDARY DISCHARGE DIAGNOSES  Diagnosis: Afib  Assessment and Plan of Treatment: Atrial fibrillation is the most common heart rhythm problem.  The condition puts you at risk for has stroke and heart attack  It helps if you control your blood pressure, not drink more than 1-2 alcohol drinks per day, cut down on caffeine, getting treatment for over active thyroid gland, and get regular exercise  Call your doctor if you feel your heart racing or beating unusually, chest tightness or pain, lightheaded, faint, shortness of breath especially with exercise  It is important to take your heart medication as prescribed  You may be on anticoagulation which is very important to take as directed - you may need blood work to monitor drug levels      Diagnosis: DM2 (diabetes mellitus, type 2)  Assessment and Plan of Treatment: HgA1C this admission was 5.7  Make sure you get your HgA1c checked every three months.  If you take oral diabetes medications, check your blood glucose two times a day.  If you take insulin, check your blood glucose before meals and at bedtime.  It's important not to skip any meals.  Keep a log of your blood glucose results and always take it with you to your doctor appointments.  Keep a list of your current medications including injectables and over the counter medications and bring this medication list with you to all your doctor appointments.  If you have not seen your ophthalmologist this year call for appointment.  Check your feet daily for redness, sores, or openings. Do not self treat. If no improvement in two days call your primary care physician for an appointment.  Low blood sugar (hypoglycemia) is a blood sugar below 70mg/dl. Check your blood sugar if you feel signs/symptoms of hypoglycemia. If your blood sugar is below 70 take 15 grams of carbohydrates (ex 4 oz of apple juice, 3-4 glucose tablets, or 4-6 oz of regular soda) wait 15 minutes and repeat blood sugar to make sure it comes up above 70.  If your blood sugar is above 70 and you are due for a meal, have a meal.  If you are not due for a meal have a snack.  This snack helps keeps your blood sugar at a safe range.      Diagnosis: S/P TAVR (transcatheter aortic valve replacement)  Assessment and Plan of Treatment: Follow up structural heart for managment    Diagnosis: H/O adrenal insufficiency  Assessment and Plan of Treatment: Continue Medrol. Follow up with endocrine    Diagnosis: Recurrent bronchiectasis  Assessment and Plan of Treatment: Follow up with Dr. Allison     PRINCIPAL DISCHARGE DIAGNOSIS  Diagnosis: COPD exacerbation  Assessment and Plan of Treatment: Call your Health Care provider upon arrival home to make a follow up appointment within one week.  Take all inhalers as prescribed by your Health Care Provider.  Take steroids as prescribed by your Health Care Provider.  If your cough increases infrequency and severity and/or you have shortness of breath or increased shortness of breath call your Health Care Provider.  If you develop fever, chills, night sweats, malaise, and/or change in mental status call your Health care Provider.  Nutrition is very important.  Eat small frequent meals.  Increase your activity as tolerated.  Do not stay in bed all day        SECONDARY DISCHARGE DIAGNOSES  Diagnosis: DM2 (diabetes mellitus, type 2)  Assessment and Plan of Treatment: HgA1C this admission was 5.7  Make sure you get your HgA1c checked every three months.  If you take oral diabetes medications, check your blood glucose two times a day.  If you take insulin, check your blood glucose before meals and at bedtime.  It's important not to skip any meals.  Keep a log of your blood glucose results and always take it with you to your doctor appointments.  Keep a list of your current medications including injectables and over the counter medications and bring this medication list with you to all your doctor appointments.  If you have not seen your ophthalmologist this year call for appointment.  Check your feet daily for redness, sores, or openings. Do not self treat. If no improvement in two days call your primary care physician for an appointment.  Low blood sugar (hypoglycemia) is a blood sugar below 70mg/dl. Check your blood sugar if you feel signs/symptoms of hypoglycemia. If your blood sugar is below 70 take 15 grams of carbohydrates (ex 4 oz of apple juice, 3-4 glucose tablets, or 4-6 oz of regular soda) wait 15 minutes and repeat blood sugar to make sure it comes up above 70.  If your blood sugar is above 70 and you are due for a meal, have a meal.  If you are not due for a meal have a snack.  This snack helps keeps your blood sugar at a safe range.      Diagnosis: Afib  Assessment and Plan of Treatment: Atrial fibrillation is the most common heart rhythm problem.  The condition puts you at risk for has stroke and heart attack  It helps if you control your blood pressure, not drink more than 1-2 alcohol drinks per day, cut down on caffeine, getting treatment for over active thyroid gland, and get regular exercise  Call your doctor if you feel your heart racing or beating unusually, chest tightness or pain, lightheaded, faint, shortness of breath especially with exercise  It is important to take your heart medication as prescribed  You may be on anticoagulation which is very important to take as directed - you may need blood work to monitor drug levels      Diagnosis: H/O adrenal insufficiency  Assessment and Plan of Treatment: Continue Medrol. Follow up with endocrine    Diagnosis: S/P TAVR (transcatheter aortic valve replacement)  Assessment and Plan of Treatment: Follow up structural heart for managment    Diagnosis: Recurrent bronchiectasis  Assessment and Plan of Treatment: Follow up with Dr. Allison     PRINCIPAL DISCHARGE DIAGNOSIS  Diagnosis: COPD exacerbation  Assessment and Plan of Treatment: Continue Medrol taper as directed and follow up with pulmonology.  Call your Health Care provider upon arrival home to make a follow up appointment within one week.  Take all inhalers as prescribed by your Health Care Provider.  Take steroids as prescribed by your Health Care Provider.  If your cough increases infrequency and severity and/or you have shortness of breath or increased shortness of breath call your Health Care Provider.  If you develop fever, chills, night sweats, malaise, and/or change in mental status call your Health care Provider.  Nutrition is very important.  Eat small frequent meals.  Increase your activity as tolerated.  Do not stay in bed all day        SECONDARY DISCHARGE DIAGNOSES  Diagnosis: DM2 (diabetes mellitus, type 2)  Assessment and Plan of Treatment: HgA1C this admission was 5.7  Make sure you get your HgA1c checked every three months.  If you take oral diabetes medications, check your blood glucose two times a day.  If you take insulin, check your blood glucose before meals and at bedtime.  It's important not to skip any meals.  Keep a log of your blood glucose results and always take it with you to your doctor appointments.  Keep a list of your current medications including injectables and over the counter medications and bring this medication list with you to all your doctor appointments.  If you have not seen your ophthalmologist this year call for appointment.  Check your feet daily for redness, sores, or openings. Do not self treat. If no improvement in two days call your primary care physician for an appointment.  Low blood sugar (hypoglycemia) is a blood sugar below 70mg/dl. Check your blood sugar if you feel signs/symptoms of hypoglycemia. If your blood sugar is below 70 take 15 grams of carbohydrates (ex 4 oz of apple juice, 3-4 glucose tablets, or 4-6 oz of regular soda) wait 15 minutes and repeat blood sugar to make sure it comes up above 70.  If your blood sugar is above 70 and you are due for a meal, have a meal.  If you are not due for a meal have a snack.  This snack helps keeps your blood sugar at a safe range.      Diagnosis: Afib  Assessment and Plan of Treatment: INR check within 1 week.  Atrial fibrillation is the most common heart rhythm problem.  The condition puts you at risk for has stroke and heart attack  It helps if you control your blood pressure, not drink more than 1-2 alcohol drinks per day, cut down on caffeine, getting treatment for over active thyroid gland, and get regular exercise  Call your doctor if you feel your heart racing or beating unusually, chest tightness or pain, lightheaded, faint, shortness of breath especially with exercise  It is important to take your heart medication as prescribed.    Diagnosis: H/O adrenal insufficiency  Assessment and Plan of Treatment: Continue Medrol. Follow up with endocrine    Diagnosis: S/P TAVR (transcatheter aortic valve replacement)  Assessment and Plan of Treatment: Follow up structural heart for managment    Diagnosis: Recurrent bronchiectasis  Assessment and Plan of Treatment: Follow up with Dr. Allison     PRINCIPAL DISCHARGE DIAGNOSIS  Diagnosis: COPD exacerbation  Assessment and Plan of Treatment: Continue Medrol taper as directed and follow up with pulmonology.  Call your Health Care provider upon arrival home to make a follow up appointment within one week.  Take all inhalers as prescribed by your Health Care Provider.  Take steroids as prescribed by your Health Care Provider.  If your cough increases infrequency and severity and/or you have shortness of breath or increased shortness of breath call your Health Care Provider.  If you develop fever, chills, night sweats, malaise, and/or change in mental status call your Health care Provider.  Nutrition is very important.  Eat small frequent meals.  Increase your activity as tolerated.  Do not stay in bed all day        SECONDARY DISCHARGE DIAGNOSES  Diagnosis: DM2 (diabetes mellitus, type 2)  Assessment and Plan of Treatment: HgA1C this admission was 5.7  Make sure you get your HgA1c checked every three months.  If you take oral diabetes medications, check your blood glucose two times a day.  If you take insulin, check your blood glucose before meals and at bedtime.  It's important not to skip any meals.  Keep a log of your blood glucose results and always take it with you to your doctor appointments.  Keep a list of your current medications including injectables and over the counter medications and bring this medication list with you to all your doctor appointments.  If you have not seen your ophthalmologist this year call for appointment.  Check your feet daily for redness, sores, or openings. Do not self treat. If no improvement in two days call your primary care physician for an appointment.  Low blood sugar (hypoglycemia) is a blood sugar below 70mg/dl. Check your blood sugar if you feel signs/symptoms of hypoglycemia. If your blood sugar is below 70 take 15 grams of carbohydrates (ex 4 oz of apple juice, 3-4 glucose tablets, or 4-6 oz of regular soda) wait 15 minutes and repeat blood sugar to make sure it comes up above 70.  If your blood sugar is above 70 and you are due for a meal, have a meal.  If you are not due for a meal have a snack.  This snack helps keeps your blood sugar at a safe range.      Diagnosis: Afib  Assessment and Plan of Treatment: INR check within 1 week.  Atrial fibrillation is the most common heart rhythm problem.  The condition puts you at risk for has stroke and heart attack  It helps if you control your blood pressure, not drink more than 1-2 alcohol drinks per day, cut down on caffeine, getting treatment for over active thyroid gland, and get regular exercise  Call your doctor if you feel your heart racing or beating unusually, chest tightness or pain, lightheaded, faint, shortness of breath especially with exercise  It is important to take your heart medication as prescribed.    Diagnosis: Recurrent bronchiectasis  Assessment and Plan of Treatment: Bronchiectasis is dilation and destruction of larger bronchi caused by chronic infection and inflammation. Common causes are cystic fibrosis, immune defects, and recurrent infections, though some cases seem to be idiopathic. Common symptoms are chronic cough and purulent sputum expectoration with or without dyspnea. Symptoms may worsen and may include fever during acute exacerbations. Diagnosis is based on history and imaging, usually involving high-resolution computed tomography, though standard chest x-rays may be diagnostic. Treatment and prevention of acute exacerbations are with bronchodilators, clearance of secretions, antibiotics, and management of complications, such as hemoptysis and further lung damage due to resistant or opportunistic infections. Please continue with your current medications. Please follow up with Dr. Allison - Pulmonologist.    Diagnosis: H/O adrenal insufficiency  Assessment and Plan of Treatment: Continue Medrol. Follow up with endocrine    Diagnosis: S/P TAVR (transcatheter aortic valve replacement)  Assessment and Plan of Treatment: Follow up structural heart for managment.     PRINCIPAL DISCHARGE DIAGNOSIS  Diagnosis: COPD exacerbation  Assessment and Plan of Treatment: Continue Medrol taper as directed and follow up with pulmonology.  Call your Health Care provider upon arrival home to make a follow up appointment within one week.  Take all inhalers as prescribed by your Health Care Provider.  Take steroids as prescribed by your Health Care Provider.  If your cough increases infrequency and severity and/or you have shortness of breath or increased shortness of breath call your Health Care Provider.  If you develop fever, chills, night sweats, malaise, and/or change in mental status call your Health care Provider.  Nutrition is very important.  Eat small frequent meals.  Increase your activity as tolerated.  Do not stay in bed all day        SECONDARY DISCHARGE DIAGNOSES  Diagnosis: Recurrent bronchiectasis  Assessment and Plan of Treatment: Bronchiectasis is dilation and destruction of larger bronchi caused by chronic infection and inflammation. Common causes are cystic fibrosis, immune defects, and recurrent infections, though some cases seem to be idiopathic. Common symptoms are chronic cough and purulent sputum expectoration with or without dyspnea. Symptoms may worsen and may include fever during acute exacerbations. Diagnosis is based on history and imaging, usually involving high-resolution computed tomography, though standard chest x-rays may be diagnostic. Treatment and prevention of acute exacerbations are with bronchodilators, clearance of secretions, antibiotics, and management of complications, such as hemoptysis and further lung damage due to resistant or opportunistic infections. Please continue with your current medications. Please follow up with Dr. Allison - Pulmonologist.    Diagnosis: DM2 (diabetes mellitus, type 2)  Assessment and Plan of Treatment: HgA1C this admission was 5.7  Make sure you get your HgA1c checked every three months.  If you take oral diabetes medications, check your blood glucose two times a day.  If you take insulin, check your blood glucose before meals and at bedtime.  It's important not to skip any meals.  Keep a log of your blood glucose results and always take it with you to your doctor appointments.  Keep a list of your current medications including injectables and over the counter medications and bring this medication list with you to all your doctor appointments.  If you have not seen your ophthalmologist this year call for appointment.  Check your feet daily for redness, sores, or openings. Do not self treat. If no improvement in two days call your primary care physician for an appointment.  Low blood sugar (hypoglycemia) is a blood sugar below 70mg/dl. Check your blood sugar if you feel signs/symptoms of hypoglycemia. If your blood sugar is below 70 take 15 grams of carbohydrates (ex 4 oz of apple juice, 3-4 glucose tablets, or 4-6 oz of regular soda) wait 15 minutes and repeat blood sugar to make sure it comes up above 70.  If your blood sugar is above 70 and you are due for a meal, have a meal.  If you are not due for a meal have a snack.  This snack helps keeps your blood sugar at a safe range.      Diagnosis: Afib  Assessment and Plan of Treatment: INR check within 1 week. Follow up with Dr. Leahy.  Atrial fibrillation is the most common heart rhythm problem.  The condition puts you at risk for has stroke and heart attack  It helps if you control your blood pressure, not drink more than 1-2 alcohol drinks per day, cut down on caffeine, getting treatment for over active thyroid gland, and get regular exercise  Call your doctor if you feel your heart racing or beating unusually, chest tightness or pain, lightheaded, faint, shortness of breath especially with exercise  It is important to take your heart medication as prescribed.    Diagnosis: H/O adrenal insufficiency  Assessment and Plan of Treatment: Continue Medrol. Follow up with endocrine    Diagnosis: S/P TAVR (transcatheter aortic valve replacement)  Assessment and Plan of Treatment: Follow up structural heart for managment.     PRINCIPAL DISCHARGE DIAGNOSIS  Diagnosis: Recurrent bronchiectasis  Assessment and Plan of Treatment: Bronchiectasis is dilation and destruction of larger bronchi caused by chronic infection and inflammation. Common causes are cystic fibrosis, immune defects, and recurrent infections, though some cases seem to be idiopathic. Common symptoms are chronic cough and purulent sputum expectoration with or without dyspnea. Symptoms may worsen and may include fever during acute exacerbations. Diagnosis is based on history and imaging, usually involving high-resolution computed tomography, though standard chest x-rays may be diagnostic. Treatment and prevention of acute exacerbations are with bronchodilators, clearance of secretions, antibiotics, and management of complications, such as hemoptysis and further lung damage due to resistant or opportunistic infections. Please continue with your current medications. Please follow up with Dr. Allison - Pulmonologist.      SECONDARY DISCHARGE DIAGNOSES  Diagnosis: COPD exacerbation  Assessment and Plan of Treatment: Continue Medrol taper as directed and follow up with pulmonology.  Call your Health Care provider upon arrival home to make a follow up appointment within one week.  Take all inhalers as prescribed by your Health Care Provider.  Take steroids as prescribed by your Health Care Provider.  If your cough increases infrequency and severity and/or you have shortness of breath or increased shortness of breath call your Health Care Provider.  If you develop fever, chills, night sweats, malaise, and/or change in mental status call your Health care Provider.  Nutrition is very important.  Eat small frequent meals.  Increase your activity as tolerated.  Do not stay in bed all day      Diagnosis: DM2 (diabetes mellitus, type 2)  Assessment and Plan of Treatment: HgA1C this admission was 5.7  Make sure you get your HgA1c checked every three months.  If you take oral diabetes medications, check your blood glucose two times a day.  If you take insulin, check your blood glucose before meals and at bedtime.  It's important not to skip any meals.  Keep a log of your blood glucose results and always take it with you to your doctor appointments.  Keep a list of your current medications including injectables and over the counter medications and bring this medication list with you to all your doctor appointments.  If you have not seen your ophthalmologist this year call for appointment.  Check your feet daily for redness, sores, or openings. Do not self treat. If no improvement in two days call your primary care physician for an appointment.  Low blood sugar (hypoglycemia) is a blood sugar below 70mg/dl. Check your blood sugar if you feel signs/symptoms of hypoglycemia. If your blood sugar is below 70 take 15 grams of carbohydrates (ex 4 oz of apple juice, 3-4 glucose tablets, or 4-6 oz of regular soda) wait 15 minutes and repeat blood sugar to make sure it comes up above 70.  If your blood sugar is above 70 and you are due for a meal, have a meal.  If you are not due for a meal have a snack.  This snack helps keeps your blood sugar at a safe range.      Diagnosis: Afib  Assessment and Plan of Treatment: INR check within 1 week. Follow up with Dr. Leahy.  Atrial fibrillation is the most common heart rhythm problem.  The condition puts you at risk for has stroke and heart attack  It helps if you control your blood pressure, not drink more than 1-2 alcohol drinks per day, cut down on caffeine, getting treatment for over active thyroid gland, and get regular exercise  Call your doctor if you feel your heart racing or beating unusually, chest tightness or pain, lightheaded, faint, shortness of breath especially with exercise  It is important to take your heart medication as prescribed.    Diagnosis: H/O adrenal insufficiency  Assessment and Plan of Treatment: Continue Medrol. Follow up with endocrine    Diagnosis: S/P TAVR (transcatheter aortic valve replacement)  Assessment and Plan of Treatment: Follow up structural heart for managment.    Diagnosis: Recurrent bronchiectasis  Assessment and Plan of Treatment: Bronchiectasis is dilation and destruction of larger bronchi caused by chronic infection and inflammation. Common causes are cystic fibrosis, immune defects, and recurrent infections, though some cases seem to be idiopathic. Common symptoms are chronic cough and purulent sputum expectoration with or without dyspnea. Symptoms may worsen and may include fever during acute exacerbations. Diagnosis is based on history and imaging, usually involving high-resolution computed tomography, though standard chest x-rays may be diagnostic. Treatment and prevention of acute exacerbations are with bronchodilators, clearance of secretions, antibiotics, and management of complications, such as hemoptysis and further lung damage due to resistant or opportunistic infections. Please continue with your current medications. Please follow up with Dr. Allison - Pulmonologist.     PRINCIPAL DISCHARGE DIAGNOSIS  Diagnosis: Recurrent bronchiectasis  Assessment and Plan of Treatment: Continue Medrol taper as directed and follow up with Dr. Allison -  Pulmonology.  Bronchiectasis is dilation and destruction of larger bronchi caused by chronic infection and inflammation. Common causes are cystic fibrosis, immune defects, and recurrent infections, though some cases seem to be idiopathic. Common symptoms are chronic cough and purulent sputum expectoration with or without dyspnea. Symptoms may worsen and may include fever during acute exacerbations. Diagnosis is based on history and imaging, usually involving high-resolution computed tomography, though standard chest x-rays may be diagnostic. Treatment and prevention of acute exacerbations are with bronchodilators, clearance of secretions, antibiotics, and management of complications, such as hemoptysis and further lung damage due to resistant or opportunistic infections. Please continue with your current medications. Please follow up with Dr. Allison - Pulmonologist.      SECONDARY DISCHARGE DIAGNOSES  Diagnosis: COPD exacerbation  Assessment and Plan of Treatment: Continue Medrol taper as directed and follow up with pulmonology.  Call your Health Care provider upon arrival home to make a follow up appointment within one week.  Take all inhalers as prescribed by your Health Care Provider.  Take steroids as prescribed by your Health Care Provider.  If your cough increases infrequency and severity and/or you have shortness of breath or increased shortness of breath call your Health Care Provider.  If you develop fever, chills, night sweats, malaise, and/or change in mental status call your Health care Provider.  Nutrition is very important.  Eat small frequent meals.  Increase your activity as tolerated.  Do not stay in bed all day      Diagnosis: DM2 (diabetes mellitus, type 2)  Assessment and Plan of Treatment: HgA1C this admission was 5.7  Make sure you get your HgA1c checked every three months.  If you take oral diabetes medications, check your blood glucose two times a day.  If you take insulin, check your blood glucose before meals and at bedtime.  It's important not to skip any meals.  Keep a log of your blood glucose results and always take it with you to your doctor appointments.  Keep a list of your current medications including injectables and over the counter medications and bring this medication list with you to all your doctor appointments.  If you have not seen your ophthalmologist this year call for appointment.  Check your feet daily for redness, sores, or openings. Do not self treat. If no improvement in two days call your primary care physician for an appointment.  Low blood sugar (hypoglycemia) is a blood sugar below 70mg/dl. Check your blood sugar if you feel signs/symptoms of hypoglycemia. If your blood sugar is below 70 take 15 grams of carbohydrates (ex 4 oz of apple juice, 3-4 glucose tablets, or 4-6 oz of regular soda) wait 15 minutes and repeat blood sugar to make sure it comes up above 70.  If your blood sugar is above 70 and you are due for a meal, have a meal.  If you are not due for a meal have a snack.  This snack helps keeps your blood sugar at a safe range. Follow up with your PCP - Dr. Samuels.      Diagnosis: Afib  Assessment and Plan of Treatment: You are on coumadin for AFIB. Please hold coumadin dose tonight and resume 5mg tomorrow night.. Follow up with Dr. Glover - Cardiology - for INR check within 1 week.  Atrial fibrillation is the most common heart rhythm problem.  The condition puts you at risk for has stroke and heart attack  It helps if you control your blood pressure, not drink more than 1-2 alcohol drinks per day, cut down on caffeine, getting treatment for over active thyroid gland, and get regular exercise  Call your doctor if you feel your heart racing or beating unusually, chest tightness or pain, lightheaded, faint, shortness of breath especially with exercise  It is important to take your heart medication as prescribed.    Diagnosis: H/O adrenal insufficiency  Assessment and Plan of Treatment: Continue Medrol. Follow up with endocrine    Diagnosis: S/P TAVR (transcatheter aortic valve replacement)  Assessment and Plan of Treatment: Follow up structural heart for managment.    Diagnosis: Recurrent bronchiectasis  Assessment and Plan of Treatment: Bronchiectasis is dilation and destruction of larger bronchi caused by chronic infection and inflammation. Common causes are cystic fibrosis, immune defects, and recurrent infections, though some cases seem to be idiopathic. Common symptoms are chronic cough and purulent sputum expectoration with or without dyspnea. Symptoms may worsen and may include fever during acute exacerbations. Diagnosis is based on history and imaging, usually involving high-resolution computed tomography, though standard chest x-rays may be diagnostic. Treatment and prevention of acute exacerbations are with bronchodilators, clearance of secretions, antibiotics, and management of complications, such as hemoptysis and further lung damage due to resistant or opportunistic infections. Please continue with your current medications. Please follow up with Dr. Allison - Pulmonologist.     PRINCIPAL DISCHARGE DIAGNOSIS  Diagnosis: Recurrent bronchiectasis  Assessment and Plan of Treatment: Continue Medrol taper as directed and follow up with Dr. Allison -  Pulmonology.  Bronchiectasis is dilation and destruction of larger bronchi caused by chronic infection and inflammation. Common causes are cystic fibrosis, immune defects, and recurrent infections, though some cases seem to be idiopathic. Common symptoms are chronic cough and purulent sputum expectoration with or without dyspnea. Symptoms may worsen and may include fever during acute exacerbations. Diagnosis is based on history and imaging, usually involving high-resolution computed tomography, though standard chest x-rays may be diagnostic. Treatment and prevention of acute exacerbations are with bronchodilators, clearance of secretions, antibiotics, and management of complications, such as hemoptysis and further lung damage due to resistant or opportunistic infections. Please continue with your current medications. Please follow up with Dr. Allison - Pulmonologist.      SECONDARY DISCHARGE DIAGNOSES  Diagnosis: Afib  Assessment and Plan of Treatment: You are on coumadin for AFIB. Please hold coumadin dose tonight and resume 5mg tomorrow night.. Follow up with Dr. Glover - Cardiology - for INR check within 1 week.  Atrial fibrillation is the most common heart rhythm problem.  The condition puts you at risk for has stroke and heart attack  It helps if you control your blood pressure, not drink more than 1-2 alcohol drinks per day, cut down on caffeine, getting treatment for over active thyroid gland, and get regular exercise  Call your doctor if you feel your heart racing or beating unusually, chest tightness or pain, lightheaded, faint, shortness of breath especially with exercise  It is important to take your heart medication as prescribed.    Diagnosis: DM2 (diabetes mellitus, type 2)  Assessment and Plan of Treatment: HgA1C this admission was 5.7  Make sure you get your HgA1c checked every three months.  If you take oral diabetes medications, check your blood glucose two times a day.  If you take insulin, check your blood glucose before meals and at bedtime.  It's important not to skip any meals.  Keep a log of your blood glucose results and always take it with you to your doctor appointments.  Keep a list of your current medications including injectables and over the counter medications and bring this medication list with you to all your doctor appointments.  If you have not seen your ophthalmologist this year call for appointment.  Check your feet daily for redness, sores, or openings. Do not self treat. If no improvement in two days call your primary care physician for an appointment.  Low blood sugar (hypoglycemia) is a blood sugar below 70mg/dl. Check your blood sugar if you feel signs/symptoms of hypoglycemia. If your blood sugar is below 70 take 15 grams of carbohydrates (ex 4 oz of apple juice, 3-4 glucose tablets, or 4-6 oz of regular soda) wait 15 minutes and repeat blood sugar to make sure it comes up above 70.  If your blood sugar is above 70 and you are due for a meal, have a meal.  If you are not due for a meal have a snack.  This snack helps keeps your blood sugar at a safe range. Follow up with your PCP - Dr. Samuels.      Diagnosis: S/P TAVR (transcatheter aortic valve replacement)  Assessment and Plan of Treatment: Follow up structural heart for managment.    Diagnosis: H/O adrenal insufficiency  Assessment and Plan of Treatment: Continue Medrol. Follow up with endocrine    Diagnosis: Recurrent bronchiectasis  Assessment and Plan of Treatment: Bronchiectasis is dilation and destruction of larger bronchi caused by chronic infection and inflammation. Common causes are cystic fibrosis, immune defects, and recurrent infections, though some cases seem to be idiopathic. Common symptoms are chronic cough and purulent sputum expectoration with or without dyspnea. Symptoms may worsen and may include fever during acute exacerbations. Diagnosis is based on history and imaging, usually involving high-resolution computed tomography, though standard chest x-rays may be diagnostic. Treatment and prevention of acute exacerbations are with bronchodilators, clearance of secretions, antibiotics, and management of complications, such as hemoptysis and further lung damage due to resistant or opportunistic infections. Please continue with your current medications. Please follow up with Dr. Allison - Pulmonologist.    Diagnosis: COPD exacerbation  Assessment and Plan of Treatment: Continue Medrol taper as directed and follow up with pulmonology.  Call your Health Care provider upon arrival home to make a follow up appointment within one week.  Take all inhalers as prescribed by your Health Care Provider.  Take steroids as prescribed by your Health Care Provider.  If your cough increases infrequency and severity and/or you have shortness of breath or increased shortness of breath call your Health Care Provider.  If you develop fever, chills, night sweats, malaise, and/or change in mental status call your Health care Provider.  Nutrition is very important.  Eat small frequent meals.  Increase your activity as tolerated.  Do not stay in bed all day      Diagnosis: 2019 novel coronavirus disease (COVID-19)  Assessment and Plan of Treatment: You recieved Remdesivir and Dexamethasone   Please follow up with your PCP in 1-2 weeks -Call your Provider before hand to make then aware of your hospitalization   Take Tylenol for Fevers every 6 hours as needed- Do not exceed 4gm of Tylenol in a 24 hour period  Stay hydrated   Monitor your oxygen saturation        PRINCIPAL DISCHARGE DIAGNOSIS  Diagnosis: Recurrent bronchiectasis  Assessment and Plan of Treatment: Continue Medrol taper as directed and follow up with Dr. Allison -  Pulmonology.  Bronchiectasis is dilation and destruction of larger bronchi caused by chronic infection and inflammation. Common causes are cystic fibrosis, immune defects, and recurrent infections, though some cases seem to be idiopathic. Common symptoms are chronic cough and purulent sputum expectoration with or without dyspnea. Symptoms may worsen and may include fever during acute exacerbations. Diagnosis is based on history and imaging, usually involving high-resolution computed tomography, though standard chest x-rays may be diagnostic. Treatment and prevention of acute exacerbations are with bronchodilators, clearance of secretions, antibiotics, and management of complications, such as hemoptysis and further lung damage due to resistant or opportunistic infections. Please continue with your current medications. Please follow up with Dr. Allison - Pulmonologist.  Medrol 32mg for 5 days, then 24mg for 5days then 16mg daily *** call Dr. Allison to review      SECONDARY DISCHARGE DIAGNOSES  Diagnosis: Afib  Assessment and Plan of Treatment: You are on coumadin for AFIB. Please hold coumadin dose tonight and resume 5mg tomorrow night.. Follow up with Dr. Glover - Cardiology - for INR check within 1 week.  Atrial fibrillation is the most common heart rhythm problem.  The condition puts you at risk for has stroke and heart attack  It helps if you control your blood pressure, not drink more than 1-2 alcohol drinks per day, cut down on caffeine, getting treatment for over active thyroid gland, and get regular exercise  Call your doctor if you feel your heart racing or beating unusually, chest tightness or pain, lightheaded, faint, shortness of breath especially with exercise  It is important to take your heart medication as prescribed.    Diagnosis: DM2 (diabetes mellitus, type 2)  Assessment and Plan of Treatment: HgA1C this admission was 5.7  Make sure you get your HgA1c checked every three months.  If you take oral diabetes medications, check your blood glucose two times a day.  If you take insulin, check your blood glucose before meals and at bedtime.  It's important not to skip any meals.  Keep a log of your blood glucose results and always take it with you to your doctor appointments.  Keep a list of your current medications including injectables and over the counter medications and bring this medication list with you to all your doctor appointments.  If you have not seen your ophthalmologist this year call for appointment.  Check your feet daily for redness, sores, or openings. Do not self treat. If no improvement in two days call your primary care physician for an appointment.  Low blood sugar (hypoglycemia) is a blood sugar below 70mg/dl. Check your blood sugar if you feel signs/symptoms of hypoglycemia. If your blood sugar is below 70 take 15 grams of carbohydrates (ex 4 oz of apple juice, 3-4 glucose tablets, or 4-6 oz of regular soda) wait 15 minutes and repeat blood sugar to make sure it comes up above 70.  If your blood sugar is above 70 and you are due for a meal, have a meal.  If you are not due for a meal have a snack.  This snack helps keeps your blood sugar at a safe range. Follow up with your PCP - Dr. Samuels.      Diagnosis: S/P TAVR (transcatheter aortic valve replacement)  Assessment and Plan of Treatment: Follow up structural heart for managment.  Continue coumadin , Goal INR 1.7-2.5  Follow up with Dr. Glover, home draw to be set up for coumadin monitoring    Diagnosis: H/O adrenal insufficiency  Assessment and Plan of Treatment: Continue Medrol. Follow up with endocrine    Diagnosis: Recurrent bronchiectasis  Assessment and Plan of Treatment: Bronchiectasis is dilation and destruction of larger bronchi caused by chronic infection and inflammation. Common causes are cystic fibrosis, immune defects, and recurrent infections, though some cases seem to be idiopathic. Common symptoms are chronic cough and purulent sputum expectoration with or without dyspnea. Symptoms may worsen and may include fever during acute exacerbations. Diagnosis is based on history and imaging, usually involving high-resolution computed tomography, though standard chest x-rays may be diagnostic. Treatment and prevention of acute exacerbations are with bronchodilators, clearance of secretions, antibiotics, and management of complications, such as hemoptysis and further lung damage due to resistant or opportunistic infections. Please continue with your current medications. Please follow up with Dr. Allison - Pulmonologist.    Diagnosis: COPD exacerbation  Assessment and Plan of Treatment: Continue Medrol taper as directed and follow up with pulmonology.  Call your Health Care provider upon arrival home to make a follow up appointment within one week.  Take all inhalers as prescribed by your Health Care Provider.  Take steroids as prescribed by your Health Care Provider.  If your cough increases infrequency and severity and/or you have shortness of breath or increased shortness of breath call your Health Care Provider.  If you develop fever, chills, night sweats, malaise, and/or change in mental status call your Health care Provider.  Nutrition is very important.  Eat small frequent meals.  Increase your activity as tolerated.  Do not stay in bed all day      Diagnosis: 2019 novel coronavirus disease (COVID-19)  Assessment and Plan of Treatment: You recieved Remdesivir and Dexamethasone   Please follow up with your PCP in 1-2 weeks -Call your Provider before hand to make then aware of your hospitalization   Take Tylenol for Fevers every 6 hours as needed- Do not exceed 4gm of Tylenol in a 24 hour period  Stay hydrated   Monitor your oxygen saturation        PRINCIPAL DISCHARGE DIAGNOSIS  Diagnosis: Recurrent bronchiectasis  Assessment and Plan of Treatment: Continue Medrol taper as directed and follow up with Dr. Allison -  Pulmonology.  Bronchiectasis is dilation and destruction of larger bronchi caused by chronic infection and inflammation. Common causes are cystic fibrosis, immune defects, and recurrent infections, though some cases seem to be idiopathic. Common symptoms are chronic cough and purulent sputum expectoration with or without dyspnea. Symptoms may worsen and may include fever during acute exacerbations. Diagnosis is based on history and imaging, usually involving high-resolution computed tomography, though standard chest x-rays may be diagnostic. Treatment and prevention of acute exacerbations are with bronchodilators, clearance of secretions, antibiotics, and management of complications, such as hemoptysis and further lung damage due to resistant or opportunistic infections. Please continue with your current medications. Please follow up with Dr. Allison - Pulmonologist.  Medrol 32mg for 5 days, then 24mg for 5days then 16mg daily *** call Dr. Allison to review      SECONDARY DISCHARGE DIAGNOSES  Diagnosis: Afib  Assessment and Plan of Treatment: You are on coumadin for AFIB. Continue 5mg daily. Follow up with Dr. Glover - Cardiology - for INR check This week . INR 2.74 10/1/2023  Atrial fibrillation is the most common heart rhythm problem.  The condition puts you at risk for has stroke and heart attack  It helps if you control your blood pressure, not drink more than 1-2 alcohol drinks per day, cut down on caffeine, getting treatment for over active thyroid gland, and get regular exercise  Call your doctor if you feel your heart racing or beating unusually, chest tightness or pain, lightheaded, faint, shortness of breath especially with exercise  It is important to take your heart medication as prescribed.    Diagnosis: DM2 (diabetes mellitus, type 2)  Assessment and Plan of Treatment: HgA1C this admission was 5.7  Make sure you get your HgA1c checked every three months.  If you take oral diabetes medications, check your blood glucose two times a day.  If you take insulin, check your blood glucose before meals and at bedtime.  It's important not to skip any meals.  Keep a log of your blood glucose results and always take it with you to your doctor appointments.  Keep a list of your current medications including injectables and over the counter medications and bring this medication list with you to all your doctor appointments.  If you have not seen your ophthalmologist this year call for appointment.  Check your feet daily for redness, sores, or openings. Do not self treat. If no improvement in two days call your primary care physician for an appointment.  Low blood sugar (hypoglycemia) is a blood sugar below 70mg/dl. Check your blood sugar if you feel signs/symptoms of hypoglycemia. If your blood sugar is below 70 take 15 grams of carbohydrates (ex 4 oz of apple juice, 3-4 glucose tablets, or 4-6 oz of regular soda) wait 15 minutes and repeat blood sugar to make sure it comes up above 70.  If your blood sugar is above 70 and you are due for a meal, have a meal.  If you are not due for a meal have a snack.  This snack helps keeps your blood sugar at a safe range. Follow up with your PCP - Dr. Samuels.      Diagnosis: S/P TAVR (transcatheter aortic valve replacement)  Assessment and Plan of Treatment: Follow up structural heart for managment.  Continue coumadin , Goal INR 1.7-2.5  Follow up with Dr. Glover, home draw to be set up for coumadin monitoring    Diagnosis: H/O adrenal insufficiency  Assessment and Plan of Treatment: Continue Medrol. Follow up with endocrine    Diagnosis: Recurrent bronchiectasis  Assessment and Plan of Treatment: Bronchiectasis is dilation and destruction of larger bronchi caused by chronic infection and inflammation. Common causes are cystic fibrosis, immune defects, and recurrent infections, though some cases seem to be idiopathic. Common symptoms are chronic cough and purulent sputum expectoration with or without dyspnea. Symptoms may worsen and may include fever during acute exacerbations. Diagnosis is based on history and imaging, usually involving high-resolution computed tomography, though standard chest x-rays may be diagnostic. Treatment and prevention of acute exacerbations are with bronchodilators, clearance of secretions, antibiotics, and management of complications, such as hemoptysis and further lung damage due to resistant or opportunistic infections. Please continue with your current medications. Please follow up with Dr. Allison - Pulmonologist.    Diagnosis: COPD exacerbation  Assessment and Plan of Treatment: Continue Medrol taper as directed and follow up with pulmonology.  Call your Health Care provider upon arrival home to make a follow up appointment within one week.  Take all inhalers as prescribed by your Health Care Provider.  Take steroids as prescribed by your Health Care Provider.  If your cough increases infrequency and severity and/or you have shortness of breath or increased shortness of breath call your Health Care Provider.  If you develop fever, chills, night sweats, malaise, and/or change in mental status call your Health care Provider.  Nutrition is very important.  Eat small frequent meals.  Increase your activity as tolerated.  Do not stay in bed all day      Diagnosis: 2019 novel coronavirus disease (COVID-19)  Assessment and Plan of Treatment: You recieved Remdesivir and Dexamethasone   Please follow up with your PCP in 1-2 weeks -Call your Provider before hand to make then aware of your hospitalization   Take Tylenol for Fevers every 6 hours as needed- Do not exceed 4gm of Tylenol in a 24 hour period  Stay hydrated   Monitor your oxygen saturation        PRINCIPAL DISCHARGE DIAGNOSIS  Diagnosis: Recurrent bronchiectasis  Assessment and Plan of Treatment: Continue Medrol taper as directed and follow up with Dr. Allison -  Pulmonology.  Bronchiectasis is dilation and destruction of larger bronchi caused by chronic infection and inflammation. Common causes are cystic fibrosis, immune defects, and recurrent infections, though some cases seem to be idiopathic. Common symptoms are chronic cough and purulent sputum expectoration with or without dyspnea. Symptoms may worsen and may include fever during acute exacerbations. Diagnosis is based on history and imaging, usually involving high-resolution computed tomography, though standard chest x-rays may be diagnostic. Treatment and prevention of acute exacerbations are with bronchodilators, clearance of secretions, antibiotics, and management of complications, such as hemoptysis and further lung damage due to resistant or opportunistic infections. Please continue with your current medications. Please follow up with Dr. Allison - Pulmonologist.  Medrol 32mg for 5 days, then 24mg for 5days then 16mg daily *** call Dr. Allison to review      SECONDARY DISCHARGE DIAGNOSES  Diagnosis: Afib  Assessment and Plan of Treatment: You are on coumadin for AFIB. Continue 5mg daily. Follow up with Dr. Glover - Cardiology - for INR check This week . INR 2.74 10/1/2023  Atrial fibrillation is the most common heart rhythm problem.  The condition puts you at risk for has stroke and heart attack  It helps if you control your blood pressure, not drink more than 1-2 alcohol drinks per day, cut down on caffeine, getting treatment for over active thyroid gland, and get regular exercise  Call your doctor if you feel your heart racing or beating unusually, chest tightness or pain, lightheaded, faint, shortness of breath especially with exercise  It is important to take your heart medication as prescribed.    Diagnosis: DM2 (diabetes mellitus, type 2)  Assessment and Plan of Treatment: HgA1C this admission was 5.7  Make sure you get your HgA1c checked every three months.  If you take oral diabetes medications, check your blood glucose two times a day.  If you take insulin, check your blood glucose before meals and at bedtime.  It's important not to skip any meals.  Keep a log of your blood glucose results and always take it with you to your doctor appointments.  Keep a list of your current medications including injectables and over the counter medications and bring this medication list with you to all your doctor appointments.  If you have not seen your ophthalmologist this year call for appointment.  Check your feet daily for redness, sores, or openings. Do not self treat. If no improvement in two days call your primary care physician for an appointment.  Low blood sugar (hypoglycemia) is a blood sugar below 70mg/dl. Check your blood sugar if you feel signs/symptoms of hypoglycemia. If your blood sugar is below 70 take 15 grams of carbohydrates (ex 4 oz of apple juice, 3-4 glucose tablets, or 4-6 oz of regular soda) wait 15 minutes and repeat blood sugar to make sure it comes up above 70.  If your blood sugar is above 70 and you are due for a meal, have a meal.  If you are not due for a meal have a snack.  This snack helps keeps your blood sugar at a safe range. Follow up with your PCP - Dr. Samuels.  While you are on 32mg of medrol : Humalog 12 units in am, 16units at lunch, 14 units at dinner and Lantus 18 units at night   While you are on 24mg of medrol: Humalog 11 units in am , 14 units at lunch, 12 units at dinner and Lantus 16 units at night   While you are on 16mg of medrol: Humalog 10 units in am , 12 units at lunch, 10 units with dinner and Lantus 14 units at night.       Diagnosis: S/P TAVR (transcatheter aortic valve replacement)  Assessment and Plan of Treatment: Follow up structural heart for managment.  Continue coumadin , Goal INR 1.7-2.5  Follow up with Dr. Glover, home draw to be set up for coumadin monitoring    Diagnosis: H/O adrenal insufficiency  Assessment and Plan of Treatment: Continue Medrol. Follow up with endocrine    Diagnosis: Recurrent bronchiectasis  Assessment and Plan of Treatment: Bronchiectasis is dilation and destruction of larger bronchi caused by chronic infection and inflammation. Common causes are cystic fibrosis, immune defects, and recurrent infections, though some cases seem to be idiopathic. Common symptoms are chronic cough and purulent sputum expectoration with or without dyspnea. Symptoms may worsen and may include fever during acute exacerbations. Diagnosis is based on history and imaging, usually involving high-resolution computed tomography, though standard chest x-rays may be diagnostic. Treatment and prevention of acute exacerbations are with bronchodilators, clearance of secretions, antibiotics, and management of complications, such as hemoptysis and further lung damage due to resistant or opportunistic infections. Please continue with your current medications. Please follow up with Dr. Allison - Pulmonologist.    Diagnosis: COPD exacerbation  Assessment and Plan of Treatment: Continue Medrol taper as directed and follow up with pulmonology.  Call your Health Care provider upon arrival home to make a follow up appointment within one week.  Take all inhalers as prescribed by your Health Care Provider.  Take steroids as prescribed by your Health Care Provider.  If your cough increases infrequency and severity and/or you have shortness of breath or increased shortness of breath call your Health Care Provider.  If you develop fever, chills, night sweats, malaise, and/or change in mental status call your Health care Provider.  Nutrition is very important.  Eat small frequent meals.  Increase your activity as tolerated.  Do not stay in bed all day      Diagnosis: 2019 novel coronavirus disease (COVID-19)  Assessment and Plan of Treatment: You recieved Remdesivir and Dexamethasone   Please follow up with your PCP in 1-2 weeks -Call your Provider before hand to make then aware of your hospitalization   Take Tylenol for Fevers every 6 hours as needed- Do not exceed 4gm of Tylenol in a 24 hour period  Stay hydrated   Monitor your oxygen saturation

## 2023-09-16 NOTE — PROGRESS NOTE ADULT - ASSESSMENT
72 F w/h/o uncontrolled T2DM (A1C 9.4%) on basal/bolus insulin PTA. Unknown DM complications. Also COPD, secondary adrenal Insufficiency on chronic steroids, colorectal cancer s/p resection (colostomy bag), Chronic A fib on Eliquis, and tracheomalacia and multiple intubations, type A aortic dissection s/p aortic dissection repair on 9/07/22, sepsis. Pt well known to this provider from prior admissions. Pt recently admitted with SOB and found to have anemia and severe aortic stenosis>requiring valve in valve TAVR 9/7/23 discharged 9/10/23. Back with increasing productive cough, dyspnea and also found to have Herpes zoster on antiviral. Endocrine consult for management of hyperglycemia. Decreasing insulin requirements last few days/glucose dropping overnight. Will adjust insulin doses based on BG pattern. BG goal (100-180mg/dl).          Home regimen: Lantus 18 untis at HS and Humalog 15 units ACTID

## 2023-09-16 NOTE — PROGRESS NOTE ADULT - ASSESSMENT
72 y.o. F with PMH of asthma on chronic steroids, bronchiectasis, allergic rhinitis,  recurrent PNA,  tracheomalacia s/p tracheoplasty, ESBL proteus infections (sputum),  diabetes, paroxysmal A-fib on coumadin, colorectal cancer many years ago status post colostomy, recent hospitalization at an outside hospital  for acute respiratory failure with hypoxia secondary to asthma/COPD exacerbation and bronchopneumonia 6/5/2023 - 6/16/2023), and AVR 2022 with Dr. Cabrera.  Pt admits to chronic SOB and cough for years and is up to date on vaccines. Dr. Allison follows as an outpatient for her COPD. s/p 9/6 TAVR- [facility] with Dr. Gonsalves and Dr. Leahy. discharged home 9/10. Pt presents to office today for f/u visit  w/ c/o of worsening SOB. Pt to be admitted for further workup and eval. Pt stable at this time. VSS; O2 sats 97% RA.  ,H/o  ,bronchiectasis, tracheomalacia s/p tracheoplasty who presents after a recent admission for bronchiectasis exacerbation (6/2023, treated with Ertapenem for ESBL proteus), and again in 9/2023 for an acute flare whose hospital course was complicated by identification of severe AS requiring valve in valve TAVR 9/7/23    bronchiectasis  9/13 Pulm eval /rec Routine sputum culture, urine strep/legionella, RVP, COVID  3 sputum AFB   Considering culture history would start ertapenem and vancomycin for MRSA   Bronchodilator: Standing duonebs q 4 hours, nebulized pulmicort  - Continue steroids at 16 mg for now  - Ensure she is getting twice per day airway clearance: Albuterol nebulizer followed by saline nebulizer followed by cough assist device and encouragement to cough and clear  - Pulmonary will continue to follow    F/u  cultures, ambulation.  Coumadin, h/o paf  D/c planning when stable    S/P TAVR (transcatheter aortic valve replacement).   ·  Plan: Structural Team Following   Continue with Plavix 75 mg PO daily   Continue with Mexiletine 200 mg every 8 hours   Continue with Atorvastatin 20 mg PO HS    Daily EKG   Increase activity as tolerated.   Encourage Chest PT / Pulmonary toileting and Incentive spirometry every 1 hour x 10 while awake.   Continue with PUD and DVT prophylaxis.   Daily Shower     shingles  - start valcyte 1 gram tic for 7 days      Uncontrolled type 2 diabetes mellitus with hyperglycemia.   ·  Plan: Test BG ac and hs and 2am if eating. Q6H while NPO  -restart Lantus 32 units sq hs   - restart  Admelog 15-16-18 units qac   Hold if NPO or eating less than 50% of meals.  -  moderate correction scale ac meals and moderate correction hs and 2am.      Plan to d/c on basal bolus.   Outpt. endo follow-up. Dr Saavedra  Outpt. optho, podiatry, micro/cr  Consider Freesytle Luis E 3  upon discharge.    Essential hypertension.   ·  Plan: Goal BP <130/80   Manage per primary team.     Hyperlipidemia.   ·  Plan: LDL goal <70 due to DM  Pt LDL N/A  Order fasting lipid if not recently done  Statin as noted above   Manage per primary team   F/u levels as out pt.    H/O adrenal insufficiency.   ·  Plan: On chronic steroids at home > medrol 8mg qd from admission in 2022  Per pt she has been sick this year with multiple hospitalizations requiring pulse steroids. Was on Methylprednisolone 28mg PTA.   -Pt on slow titration back to Medrol 8mg qd  -c/w MTP 16mg   - Will need stress steroids if acutely ill or for OR. No need for stress steroid dose at this time since pt received Prednisone 50mg x3 prior to OR now on medrol 16 qd.    Atrial fibrillation.   ·  Plan: Continue with Mexiletine 200 mg every 8 hours   Coumadin 5 mg PO tonight for INR 1.46  Daily PT/ INR.

## 2023-09-16 NOTE — DISCHARGE NOTE PROVIDER - NSDCMRMEDTOKEN_GEN_ALL_CORE_FT
Albuterol (Eqv-Ventolin HFA) 90 mcg/inh inhalation aerosol: 2 puff(s) inhaled 3 times a day as needed for  shortness of breath and/or wheezing after neb  albuterol 2.5 mg/3 mL (0.083%) inhalation solution: 2.5 milligram(s) by nebulizer every 6 hours as needed for  shortness of breath and/or wheezing  Basaglar KwikPen 100 units/mL subcutaneous solution: 35 unit(s) subcutaneous once a day (at bedtime)  Breo Ellipta 200 mcg-25 mcg/inh inhalation powder: 1 puff(s) inhaled once a day  clopidogrel 75 mg oral tablet: 1 tab(s) orally once a day  Crestor 5 mg oral tablet: 1 tab(s) orally once a day (at bedtime)  famotidine 20 mg oral tablet: 1 tab(s) orally 2 times a day  guaiFENesin 600 mg oral tablet, extended release: 1 tab(s) orally every 12 hours  HumaLOG KwikPen 100 units/mL injectable solution: 18 unit(s) subcutaneously twice a day before breakfast and dinner Inject as per sliding scale: 100-150 = 5 units, 151-200 = 6 units, 201-250 = 7 units, 251-300 = 8 units, 301-350 = 9 units, 351-400 = 10 units, &gt; 400 = call md  HumaLOG KwikPen 100 units/mL injectable solution: 16 unit(s) subcutaneous once a day before lunch unit(s) subcutaneous 3 times a day (with meals)  HumaLOG KwikPen 100 units/mL injectable solution: 5 unit(s) subcutaneous once a day (at bedtime) unit(s) subcutaneous 3 times a day (with meals)  Incruse Ellipta 62.5 mcg/inh inhalation powder: 1 puff(s) inhaled every 24 hours  methylPREDNISolone 16 mg oral tablet: 1 tab(s) orally once a day  mexiletine 200 mg oral capsule: 1 cap(s) orally every 8 hours  senna leaf extract oral tablet: 2 tab(s) orally once a day (at bedtime)  simethicone 80 mg oral tablet, chewable: 1 tab(s) orally every 12 hours as needed for Gas   Albuterol (Eqv-Ventolin HFA) 90 mcg/inh inhalation aerosol: 2 puff(s) inhaled 3 times a day as needed for  shortness of breath and/or wheezing after neb  albuterol 2.5 mg/3 mL (0.083%) inhalation solution: 2.5 milligram(s) by nebulizer every 6 hours as needed for  shortness of breath and/or wheezing  ascorbic acid 500 mg oral tablet: 1 tab(s) orally once a day  Basaglar KwikPen 100 units/mL subcutaneous solution: 35 unit(s) subcutaneous once a day (at bedtime)  Breo Ellipta 200 mcg-25 mcg/inh inhalation powder: 1 puff(s) inhaled once a day  clopidogrel 75 mg oral tablet: 1 tab(s) orally once a day  Crestor 5 mg oral tablet: 1 tab(s) orally once a day (at bedtime)  famotidine 20 mg oral tablet: 1 tab(s) orally 2 times a day  HumaLOG KwikPen 100 units/mL injectable solution: 18 unit(s) subcutaneously twice a day before breakfast and dinner Inject as per sliding scale: 100-150 = 5 units, 151-200 = 6 units, 201-250 = 7 units, 251-300 = 8 units, 301-350 = 9 units, 351-400 = 10 units, &gt; 400 = call md  HumaLOG KwikPen 100 units/mL injectable solution: 16 unit(s) subcutaneous once a day before lunch unit(s) subcutaneous 3 times a day (with meals)  HumaLOG KwikPen 100 units/mL injectable solution: 5 unit(s) subcutaneous once a day (at bedtime) unit(s) subcutaneous 3 times a day (with meals)  Incruse Ellipta 62.5 mcg/inh inhalation powder: 1 puff(s) inhaled every 24 hours  methylPREDNISolone 16 mg oral tablet: 1 tab(s) orally once a day  mexiletine 200 mg oral capsule: 1 cap(s) orally every 8 hours  pantoprazole 40 mg oral delayed release tablet: 1 tab(s) orally once a day  senna leaf extract oral tablet: 2 tab(s) orally once a day (at bedtime)  simethicone 80 mg oral tablet, chewable: 1 tab(s) orally every 12 hours as needed for Gas   Albuterol (Eqv-Ventolin HFA) 90 mcg/inh inhalation aerosol: 2 puff(s) inhaled 3 times a day as needed for  shortness of breath and/or wheezing after neb  albuterol 2.5 mg/3 mL (0.083%) inhalation solution: 2.5 milligram(s) by nebulizer every 6 hours as needed for  shortness of breath and/or wheezing  ascorbic acid 500 mg oral tablet: 1 tab(s) orally once a day  Basaglar KwikPen 100 units/mL subcutaneous solution: 35 unit(s) subcutaneous once a day (at bedtime)  Breo Ellipta 200 mcg-25 mcg/inh inhalation powder: 1 puff(s) inhaled once a day  clopidogrel 75 mg oral tablet: 1 tab(s) orally once a day  Crestor 5 mg oral tablet: 1 tab(s) orally once a day (at bedtime)  famotidine 20 mg oral tablet: 1 tab(s) orally 2 times a day  HumaLOG KwikPen 100 units/mL injectable solution: 18 unit(s) subcutaneously twice a day before breakfast and dinner Inject as per sliding scale: 100-150 = 5 units, 151-200 = 6 units, 201-250 = 7 units, 251-300 = 8 units, 301-350 = 9 units, 351-400 = 10 units, &gt; 400 = call md  HumaLOG KwikPen 100 units/mL injectable solution: 16 unit(s) subcutaneous once a day before lunch unit(s) subcutaneous 3 times a day (with meals)  HumaLOG KwikPen 100 units/mL injectable solution: 5 unit(s) subcutaneous once a day (at bedtime) unit(s) subcutaneous 3 times a day (with meals)  Incruse Ellipta 62.5 mcg/inh inhalation powder: 1 puff(s) inhaled every 24 hours  insulin glargine 100 units/mL subcutaneous solution: 18 unit(s) subcutaneous once a day (at bedtime)  methylPREDNISolone 16 mg oral tablet: 1 tab(s) orally once a day  mexiletine 200 mg oral capsule: 1 cap(s) orally every 8 hours  pantoprazole 40 mg oral delayed release tablet: 1 tab(s) orally once a day  senna leaf extract oral tablet: 2 tab(s) orally once a day (at bedtime)  simethicone 80 mg oral tablet, chewable: 1 tab(s) orally every 12 hours as needed for Gas   Albuterol (Eqv-Ventolin HFA) 90 mcg/inh inhalation aerosol: 2 puff(s) inhaled 3 times a day as needed for  shortness of breath and/or wheezing after neb  albuterol 2.5 mg/3 mL (0.083%) inhalation solution: 2.5 milligram(s) by nebulizer every 6 hours as needed for  shortness of breath and/or wheezing  alcohol swabs: Apply topically to affected area 4 times a day  ascorbic acid 500 mg oral tablet: 1 tab(s) orally once a day  Basaglar KwikPen 100 units/mL subcutaneous solution: 18 unit(s) subcutaneous once a day (at bedtime) 18 unit(s) subcutaneous once a day  Breo Ellipta 200 mcg-25 mcg/inh inhalation powder: 1 puff(s) inhaled once a day  clopidogrel 75 mg oral tablet: 1 tab(s) orally once a day  Crestor 5 mg oral tablet: 1 tab(s) orally once a day (at bedtime)  gabapentin 100 mg oral capsule: 1 cap(s) orally every 8 hours  HumaLOG KwikPen 100 units/mL injectable solution: 15 unit(s) subcutaneous 3 times a day (before meals) unit(s) subcutaneous 3 times a day (with meals)  Incruse Ellipta 62.5 mcg/inh inhalation powder: 1 puff(s) inhaled every 24 hours  Insulin Pen Needles, 4mm: 1 application subcutaneously 4 times a day. ** Use with insulin pen **  lancets: 1 application subcutaneously 4 times a day  Medrol 8 mg oral tablet: 1 tab(s) orally once a day Take 5 tabs (40 mg) on 9/30/23, THEN  take  4 tabs (32 mg)daily, starting on 10/1/23 for 5 days, THEN  take 3 tabs  (24 mg) daily, starting on 10/6/23 for 5 days, THEN  take 2 tabs (16 mg) daily  mexiletine 200 mg oral capsule: 1 cap(s) orally every 8 hours  Multiple Vitamins oral tablet: 1 tab(s) orally once a day  pantoprazole 40 mg oral delayed release tablet: 1 tab(s) orally once a day  senna leaf extract oral tablet: 2 tab(s) orally once a day (at bedtime)  simethicone 80 mg oral tablet, chewable: 1 tab(s) orally every 12 hours as needed for Gas  test strips (per patient&#x27;s insurance): 1 application subcutaneously 4 times a day. ** Compatible with patient&#x27;s glucometer **   Albuterol (Eqv-Ventolin HFA) 90 mcg/inh inhalation aerosol: 2 puff(s) inhaled 3 times a day as needed for  shortness of breath and/or wheezing after neb  albuterol 2.5 mg/3 mL (0.083%) inhalation solution: 2.5 milligram(s) by nebulizer every 6 hours as needed for  shortness of breath and/or wheezing  ascorbic acid 500 mg oral tablet: 1 tab(s) orally once a day  Basaglar KwikPen 100 units/mL subcutaneous solution: 18 unit(s) subcutaneous once a day (at bedtime) 18 unit(s) subcutaneous once a day  Breo Ellipta 200 mcg-25 mcg/inh inhalation powder: 1 puff(s) inhaled once a day  clopidogrel 75 mg oral tablet: 1 tab(s) orally once a day  Crestor 5 mg oral tablet: 1 tab(s) orally once a day (at bedtime)  gabapentin 100 mg oral capsule: 1 cap(s) orally every 8 hours  HumaLOG KwikPen 100 units/mL injectable solution: 15 unit(s) subcutaneous 3 times a day (before meals) unit(s) subcutaneous 3 times a day (with meals)  Incruse Ellipta 62.5 mcg/inh inhalation powder: 1 puff(s) inhaled every 24 hours  Medrol 8 mg oral tablet: 1 tab(s) orally once a day Take 5 tabs (40 mg) on 9/30/23, THEN  take  4 tabs (32 mg)daily, starting on 10/1/23 for 5 days, THEN  take 3 tabs  (24 mg) daily, starting on 10/6/23 for 5 days, THEN  take 2 tabs (16 mg) daily  mexiletine 200 mg oral capsule: 1 cap(s) orally every 8 hours  Multiple Vitamins oral tablet: 1 tab(s) orally once a day  pantoprazole 40 mg oral delayed release tablet: 1 tab(s) orally once a day  senna leaf extract oral tablet: 2 tab(s) orally once a day (at bedtime)  simethicone 80 mg oral tablet, chewable: 1 tab(s) orally every 12 hours as needed for Gas   ascorbic acid 500 mg oral tablet: 1 tab(s) orally once a day  Basaglar KwikPen 100 units/mL subcutaneous solution: 18 unit(s) subcutaneous once a day (at bedtime) 18 unit(s) subcutaneous once a day  Breo Ellipta 200 mcg-25 mcg/inh inhalation powder: 1 puff(s) inhaled once a day  clopidogrel 75 mg oral tablet: 1 tab(s) orally once a day  Crestor 5 mg oral tablet: 1 tab(s) orally once a day (at bedtime)  gabapentin 100 mg oral capsule: 1 cap(s) orally every 8 hours  HumaLOG KwikPen 100 units/mL injectable solution: 15 unit(s) subcutaneous 3 times a day (before meals) unit(s) subcutaneous 3 times a day (with meals)  Incruse Ellipta 62.5 mcg/inh inhalation powder: 1 puff(s) inhaled every 24 hours  Medrol 8 mg oral tablet: 1 tab(s) orally once a day Take 5 tabs (40 mg) on 9/30/23, THEN  take  4 tabs (32 mg)daily, starting on 10/1/23 for 5 days, THEN  take 3 tabs  (24 mg) daily, starting on 10/6/23 for 5 days, THEN  take 2 tabs (16 mg) daily  mexiletine 200 mg oral capsule: 1 cap(s) orally every 8 hours  Multiple Vitamins oral tablet: 1 tab(s) orally once a day  pantoprazole 40 mg oral delayed release tablet: 1 tab(s) orally once a day  senna leaf extract oral tablet: 2 tab(s) orally once a day (at bedtime)  simethicone 80 mg oral tablet, chewable: 1 tab(s) orally every 12 hours as needed for Gas  Ventolin HFA 90 mcg/inh inhalation aerosol: 2 puff(s) inhaled every 6 hours as needed for  bronchospasm   ascorbic acid 500 mg oral tablet: 1 tab(s) orally once a day  Basaglar KwikPen 100 units/mL subcutaneous solution: 18 unit(s) subcutaneous once a day (at bedtime) 18 unit(s) subcutaneous once a day  Breo Ellipta 200 mcg-25 mcg/inh inhalation powder: 1 puff(s) inhaled once a day  clopidogrel 75 mg oral tablet: 1 tab(s) orally once a day  Crestor 5 mg oral tablet: 1 tab(s) orally once a day (at bedtime)  gabapentin 100 mg oral capsule: 1 cap(s) orally every 8 hours  HumaLOG KwikPen 100 units/mL injectable solution: 15 unit(s) subcutaneous 3 times a day (before meals) unit(s) subcutaneous 3 times a day (with meals)  Incruse Ellipta 62.5 mcg/inh inhalation powder: 1 puff(s) inhaled every 24 hours  Medrol 8 mg oral tablet: 1 tab(s) orally once a day Take 5 tabs (40 mg) on 9/30/23, THEN  take  4 tabs (32 mg)daily, starting on 10/1/23 for 5 days, THEN  take 3 tabs  (24 mg) daily, starting on 10/6/23 for 5 days, THEN  take 2 tabs (16 mg) daily  mexiletine 200 mg oral capsule: 1 cap(s) orally every 8 hours  Multiple Vitamins oral tablet: 1 tab(s) orally once a day  pantoprazole 40 mg oral delayed release tablet: 1 tab(s) orally once a day  senna leaf extract oral tablet: 2 tab(s) orally once a day (at bedtime)  simethicone 80 mg oral tablet, chewable: 1 tab(s) orally every 12 hours as needed for Gas  Ventolin HFA 90 mcg/inh inhalation aerosol: 2 puff(s) inhaled every 6 hours as needed for  bronchospasm  warfarin 5 mg oral tablet: 1 tab(s) orally once a day Take on 10/1/23. Please see your cardiologist for blood work and coumadin dosing.   ascorbic acid 500 mg oral tablet: 1 tab(s) orally once a day  Basaglar KwikPen 100 units/mL subcutaneous solution: 18 unit(s) subcutaneous once a day (at bedtime) 18 unit(s) subcutaneous once a day  Breo Ellipta 200 mcg-25 mcg/inh inhalation powder: 1 puff(s) inhaled once a day  clopidogrel 75 mg oral tablet: 1 tab(s) orally once a day  Crestor 5 mg oral tablet: 1 tab(s) orally once a day (at bedtime)  gabapentin 100 mg oral capsule: 1 cap(s) orally every 8 hours  HumaLOG KwikPen 100 units/mL injectable solution: 15 unit(s) subcutaneous 3 times a day (before meals) unit(s) subcutaneous 3 times a day (with meals)  Incruse Ellipta 62.5 mcg/inh inhalation powder: 1 puff(s) inhaled every 24 hours  Medrol 8 mg oral tablet: 1 tab(s) orally once a day Take 5 tabs (40 mg) on 9/30/23, THEN  take  4 tabs (32 mg)daily, starting on 10/1/23 for 5 days, THEN  take 3 tabs  (24 mg) daily, starting on 10/6/23 for 5 days, THEN  take 2 tabs (16 mg) daily  mexiletine 200 mg oral capsule: 1 cap(s) orally every 8 hours  Multiple Vitamins oral tablet: 1 tab(s) orally once a day  pantoprazole 40 mg oral delayed release tablet: 1 tab(s) orally once a day  senna leaf extract oral tablet: 2 tab(s) orally once a day (at bedtime)  simethicone 80 mg oral tablet, chewable: 1 tab(s) orally every 12 hours as needed for Gas  Ventolin HFA 90 mcg/inh inhalation aerosol: 2 puff(s) inhaled every 6 hours as needed for  bronchospasm  warfarin 5 mg oral tablet: 1 tab(s) orally once a day Take one tablet on 10/1/23. Please see your cardiologist Dr. Glover for blood work and coumadin dosing.   ascorbic acid 500 mg oral tablet: 1 tab(s) orally once a day  Basaglar KwikPen 100 units/mL subcutaneous solution: 18 unit(s) subcutaneous once a day (at bedtime) 18 unit(s) subcutaneous once a day  benzonatate 100 mg oral capsule: 1 cap(s) orally 3 times a day as needed for cough  Breo Ellipta 200 mcg-25 mcg/inh inhalation powder: 1 puff(s) inhaled once a day  clopidogrel 75 mg oral tablet: 1 tab(s) orally once a day  Crestor 5 mg oral tablet: 1 tab(s) orally once a day (at bedtime)  gabapentin 100 mg oral capsule: 1 cap(s) orally every 8 hours  guaiFENesin 100 mg/5 mL oral liquid: 10 milliliter(s) orally every 6 hours as needed for  cough  HumaLOG KwikPen 100 units/mL injectable solution: 15 unit(s) subcutaneous 3 times a day (before meals) unit(s) subcutaneous 3 times a day (with meals)  Incruse Ellipta 62.5 mcg/inh inhalation powder: 1 puff(s) inhaled every 24 hours  Medrol 8 mg oral tablet: 1 tab(s) orally once a day Take 5 tabs (40 mg) on 9/30/23, THEN  take  4 tabs (32 mg)daily, starting on 10/1/23 for 5 days, THEN  take 3 tabs  (24 mg) daily, starting on 10/6/23 for 5 days, THEN  take 2 tabs (16 mg) daily  mexiletine 200 mg oral capsule: 1 cap(s) orally every 8 hours  Multiple Vitamins oral tablet: 1 tab(s) orally once a day  pantoprazole 40 mg oral delayed release tablet: 1 tab(s) orally once a day  senna leaf extract oral tablet: 2 tab(s) orally once a day (at bedtime)  simethicone 80 mg oral tablet, chewable: 1 tab(s) orally every 12 hours as needed for Gas  Ventolin HFA 90 mcg/inh inhalation aerosol: 2 puff(s) inhaled every 6 hours as needed for  bronchospasm  warfarin 5 mg oral tablet: 1 tab(s) orally once a day Take one tablet on 10/1/23. Please see your cardiologist Dr. Glover for blood work and coumadin dosing.   ascorbic acid 500 mg oral tablet: 1 tab(s) orally once a day  Basaglar KwikPen 100 units/mL subcutaneous solution: 18 unit(s) subcutaneous once a day (at bedtime) 18 unit(s) subcutaneous once a day  benzonatate 100 mg oral capsule: 1 cap(s) orally 3 times a day as needed for cough  Breo Ellipta 200 mcg-25 mcg/inh inhalation powder: 1 puff(s) inhaled once a day  clopidogrel 75 mg oral tablet: 1 tab(s) orally once a day  Crestor 5 mg oral tablet: 1 tab(s) orally once a day (at bedtime)  gabapentin 100 mg oral capsule: 1 cap(s) orally every 8 hours  guaiFENesin 100 mg/5 mL oral liquid: 10 milliliter(s) orally every 6 hours as needed for  cough  HumaLOG KwikPen 100 units/mL injectable solution: 15 unit(s) subcutaneous 3 times a day (before meals) unit(s) subcutaneous 3 times a day (with meals)  Incruse Ellipta 62.5 mcg/inh inhalation powder: 1 puff(s) inhaled every 24 hours  Medrol 8 mg oral tablet: 1 tab(s) orally once a day Take 5 tabs (40 mg) on 9/30/23, THEN  take  4 tabs (32 mg)daily, starting on 10/1/23 for 5 days, THEN  take 3 tabs  (24 mg) daily, starting on 10/6/23 for 5 days, THEN  take 2 tabs (16 mg) daily  mexiletine 200 mg oral capsule: 1 cap(s) orally every 8 hours  Multiple Vitamins oral tablet: 1 tab(s) orally once a day  pantoprazole 40 mg oral delayed release tablet: 1 tab(s) orally once a day  PT/INR for COUMADIN 10/3/2023: Send results to Dr. Glover [phone],  [phone]  senna leaf extract oral tablet: 2 tab(s) orally once a day (at bedtime)  simethicone 80 mg oral tablet, chewable: 1 tab(s) orally every 12 hours as needed for Gas  Ventolin HFA 90 mcg/inh inhalation aerosol: 2 puff(s) inhaled every 6 hours as needed for  bronchospasm  warfarin 5 mg oral tablet: 1 tab(s) orally once a day Take one tablet on 10/1/23. Please see your cardiologist Dr. Glover for blood work and coumadin dosing.

## 2023-09-17 LAB
GLUCOSE BLDC GLUCOMTR-MCNC: 130 MG/DL — HIGH (ref 70–99)
GLUCOSE BLDC GLUCOMTR-MCNC: 143 MG/DL — HIGH (ref 70–99)
GLUCOSE BLDC GLUCOMTR-MCNC: 191 MG/DL — HIGH (ref 70–99)
GLUCOSE BLDC GLUCOMTR-MCNC: 243 MG/DL — HIGH (ref 70–99)
GLUCOSE BLDC GLUCOMTR-MCNC: 316 MG/DL — HIGH (ref 70–99)
GLUCOSE BLDC GLUCOMTR-MCNC: 349 MG/DL — HIGH (ref 70–99)
GLUCOSE BLDC GLUCOMTR-MCNC: 57 MG/DL — LOW (ref 70–99)
GLUCOSE BLDC GLUCOMTR-MCNC: 62 MG/DL — LOW (ref 70–99)
GLUCOSE BLDC GLUCOMTR-MCNC: 77 MG/DL — SIGNIFICANT CHANGE UP (ref 70–99)
INR BLD: 1.33 RATIO — HIGH (ref 0.85–1.18)
PROTHROM AB SERPL-ACNC: 13.8 SEC — HIGH (ref 9.5–13)

## 2023-09-17 RX ORDER — ONDANSETRON 8 MG/1
4 TABLET, FILM COATED ORAL ONCE
Refills: 0 | Status: COMPLETED | OUTPATIENT
Start: 2023-09-17 | End: 2023-09-17

## 2023-09-17 RX ORDER — INSULIN GLARGINE 100 [IU]/ML
25 INJECTION, SOLUTION SUBCUTANEOUS AT BEDTIME
Refills: 0 | Status: DISCONTINUED | OUTPATIENT
Start: 2023-09-17 | End: 2023-09-18

## 2023-09-17 RX ORDER — ACETYLCYSTEINE 200 MG/ML
2 VIAL (ML) MISCELLANEOUS EVERY 8 HOURS
Refills: 0 | Status: DISCONTINUED | OUTPATIENT
Start: 2023-09-17 | End: 2023-10-01

## 2023-09-17 RX ORDER — INSULIN LISPRO 100/ML
9 VIAL (ML) SUBCUTANEOUS
Refills: 0 | Status: DISCONTINUED | OUTPATIENT
Start: 2023-09-17 | End: 2023-09-18

## 2023-09-17 RX ORDER — DIPHENHYDRAMINE HCL 50 MG
25 CAPSULE ORAL ONCE
Refills: 0 | Status: COMPLETED | OUTPATIENT
Start: 2023-09-17 | End: 2023-09-17

## 2023-09-17 RX ORDER — INSULIN LISPRO 100/ML
7 VIAL (ML) SUBCUTANEOUS
Refills: 0 | Status: DISCONTINUED | OUTPATIENT
Start: 2023-09-18 | End: 2023-09-18

## 2023-09-17 RX ORDER — WARFARIN SODIUM 2.5 MG/1
6 TABLET ORAL ONCE
Refills: 0 | Status: COMPLETED | OUTPATIENT
Start: 2023-09-17 | End: 2023-09-17

## 2023-09-17 RX ORDER — INSULIN LISPRO 100/ML
9 VIAL (ML) SUBCUTANEOUS
Refills: 0 | Status: DISCONTINUED | OUTPATIENT
Start: 2023-09-18 | End: 2023-09-18

## 2023-09-17 RX ADMIN — GABAPENTIN 100 MILLIGRAM(S): 400 CAPSULE ORAL at 05:45

## 2023-09-17 RX ADMIN — Medication 2: at 08:20

## 2023-09-17 RX ADMIN — SENNA PLUS 2 TABLET(S): 8.6 TABLET ORAL at 22:27

## 2023-09-17 RX ADMIN — FAMOTIDINE 20 MILLIGRAM(S): 10 INJECTION INTRAVENOUS at 05:45

## 2023-09-17 RX ADMIN — TRAMADOL HYDROCHLORIDE 50 MILLIGRAM(S): 50 TABLET ORAL at 06:51

## 2023-09-17 RX ADMIN — MUPIROCIN 1 APPLICATION(S): 20 OINTMENT TOPICAL at 17:29

## 2023-09-17 RX ADMIN — MEXILETINE HYDROCHLORIDE 200 MILLIGRAM(S): 150 CAPSULE ORAL at 22:27

## 2023-09-17 RX ADMIN — ATORVASTATIN CALCIUM 20 MILLIGRAM(S): 80 TABLET, FILM COATED ORAL at 22:27

## 2023-09-17 RX ADMIN — WARFARIN SODIUM 6 MILLIGRAM(S): 2.5 TABLET ORAL at 22:39

## 2023-09-17 RX ADMIN — CHLORHEXIDINE GLUCONATE 1 APPLICATION(S): 213 SOLUTION TOPICAL at 06:01

## 2023-09-17 RX ADMIN — Medication 500 MILLIGRAM(S): at 12:22

## 2023-09-17 RX ADMIN — TRAMADOL HYDROCHLORIDE 50 MILLIGRAM(S): 50 TABLET ORAL at 05:45

## 2023-09-17 RX ADMIN — Medication 1 APPLICATION(S): at 05:46

## 2023-09-17 RX ADMIN — CLOPIDOGREL BISULFATE 75 MILLIGRAM(S): 75 TABLET, FILM COATED ORAL at 12:19

## 2023-09-17 RX ADMIN — Medication 600 MILLIGRAM(S): at 17:27

## 2023-09-17 RX ADMIN — MUPIROCIN 1 APPLICATION(S): 20 OINTMENT TOPICAL at 05:50

## 2023-09-17 RX ADMIN — INSULIN GLARGINE 25 UNIT(S): 100 INJECTION, SOLUTION SUBCUTANEOUS at 22:28

## 2023-09-17 RX ADMIN — Medication 9 UNIT(S): at 08:21

## 2023-09-17 RX ADMIN — VALACYCLOVIR 1000 MILLIGRAM(S): 500 TABLET, FILM COATED ORAL at 05:46

## 2023-09-17 RX ADMIN — GABAPENTIN 100 MILLIGRAM(S): 400 CAPSULE ORAL at 13:57

## 2023-09-17 RX ADMIN — Medication 2 MILLILITER(S): at 13:58

## 2023-09-17 RX ADMIN — Medication 600 MILLIGRAM(S): at 05:45

## 2023-09-17 RX ADMIN — Medication 25 MILLIGRAM(S): at 13:57

## 2023-09-17 RX ADMIN — Medication 20 MILLIGRAM(S): at 13:58

## 2023-09-17 RX ADMIN — Medication 2 MILLILITER(S): at 22:26

## 2023-09-17 RX ADMIN — Medication 1 TABLET(S): at 12:19

## 2023-09-17 RX ADMIN — Medication 3 MILLILITER(S): at 05:51

## 2023-09-17 RX ADMIN — POLYETHYLENE GLYCOL 3350 17 GRAM(S): 17 POWDER, FOR SOLUTION ORAL at 18:46

## 2023-09-17 RX ADMIN — MEXILETINE HYDROCHLORIDE 200 MILLIGRAM(S): 150 CAPSULE ORAL at 13:59

## 2023-09-17 RX ADMIN — Medication 3 MILLILITER(S): at 17:29

## 2023-09-17 RX ADMIN — Medication 3 MILLILITER(S): at 12:19

## 2023-09-17 RX ADMIN — BUDESONIDE AND FORMOTEROL FUMARATE DIHYDRATE 2 PUFF(S): 160; 4.5 AEROSOL RESPIRATORY (INHALATION) at 17:27

## 2023-09-17 RX ADMIN — Medication 20 MILLIGRAM(S): at 22:26

## 2023-09-17 RX ADMIN — Medication 16 MILLIGRAM(S): at 05:46

## 2023-09-17 RX ADMIN — Medication 8: at 17:27

## 2023-09-17 RX ADMIN — POLYETHYLENE GLYCOL 3350 17 GRAM(S): 17 POWDER, FOR SOLUTION ORAL at 05:51

## 2023-09-17 RX ADMIN — VALACYCLOVIR 1000 MILLIGRAM(S): 500 TABLET, FILM COATED ORAL at 15:13

## 2023-09-17 RX ADMIN — FAMOTIDINE 20 MILLIGRAM(S): 10 INJECTION INTRAVENOUS at 17:27

## 2023-09-17 RX ADMIN — POLYETHYLENE GLYCOL 3350 17 GRAM(S): 17 POWDER, FOR SOLUTION ORAL at 12:21

## 2023-09-17 RX ADMIN — Medication 9 UNIT(S): at 17:28

## 2023-09-17 RX ADMIN — AZITHROMYCIN 255 MILLIGRAM(S): 500 TABLET, FILM COATED ORAL at 12:20

## 2023-09-17 RX ADMIN — TIOTROPIUM BROMIDE 2 PUFF(S): 18 CAPSULE ORAL; RESPIRATORY (INHALATION) at 12:21

## 2023-09-17 RX ADMIN — BUDESONIDE AND FORMOTEROL FUMARATE DIHYDRATE 2 PUFF(S): 160; 4.5 AEROSOL RESPIRATORY (INHALATION) at 05:51

## 2023-09-17 RX ADMIN — Medication 1 APPLICATION(S): at 17:28

## 2023-09-17 RX ADMIN — GABAPENTIN 100 MILLIGRAM(S): 400 CAPSULE ORAL at 22:27

## 2023-09-17 RX ADMIN — Medication 0: at 22:28

## 2023-09-17 RX ADMIN — MEXILETINE HYDROCHLORIDE 200 MILLIGRAM(S): 150 CAPSULE ORAL at 05:46

## 2023-09-17 RX ADMIN — VALACYCLOVIR 1000 MILLIGRAM(S): 500 TABLET, FILM COATED ORAL at 22:28

## 2023-09-17 NOTE — PROGRESS NOTE ADULT - SUBJECTIVE AND OBJECTIVE BOX
DATE OF SERVICE: 09-17-23 @ 12:29    Patient is a 72y old  Female who presents with a chief complaint of sob (16 Sep 2023 13:29)      SUBJECTIVE / OVERNIGHT EVENTS:  c/o cough , sob and KRAMER    MEDICATIONS  (STANDING):  acetylcysteine 10%  Inhalation 2 milliLiter(s) Inhalation every 8 hours  albuterol/ipratropium for Nebulization 3 milliLiter(s) Nebulizer every 6 hours  ascorbic acid 500 milliGRAM(s) Oral daily  atorvastatin 20 milliGRAM(s) Oral at bedtime  azithromycin  IVPB 500 milliGRAM(s) IV Intermittent every 24 hours  azithromycin  IVPB      budesonide  80 MICROgram(s)/formoterol 4.5 MICROgram(s) Inhaler 2 Puff(s) Inhalation two times a day  chlorhexidine 2% Cloths 1 Application(s) Topical <User Schedule>  clopidogrel Tablet 75 milliGRAM(s) Oral daily  famotidine    Tablet 20 milliGRAM(s) Oral two times a day  gabapentin 100 milliGRAM(s) Oral every 8 hours  guaiFENesin  milliGRAM(s) Oral every 12 hours  hydrocortisone 1% Cream 1 Application(s) Topical two times a day  insulin glargine Injectable (LANTUS) 22 Unit(s) SubCutaneous at bedtime  insulin lispro (ADMELOG) corrective regimen sliding scale   SubCutaneous three times a day before meals  insulin lispro (ADMELOG) corrective regimen sliding scale   SubCutaneous <User Schedule>  insulin lispro Injectable (ADMELOG) 9 Unit(s) SubCutaneous three times a day before meals  lidocaine   4% Patch 2 Patch Transdermal daily  methylPREDNISolone sodium succinate Injectable 20 milliGRAM(s) IV Push every 8 hours  mexiletine 200 milliGRAM(s) Oral every 8 hours  multivitamin 1 Tablet(s) Oral daily  mupirocin 2% Nasal 1 Application(s) Both Nostrils two times a day  ondansetron Injectable 4 milliGRAM(s) IV Push once  polyethylene glycol 3350 17 Gram(s) Oral two times a day  polyethylene glycol 3350 17 Gram(s) Oral daily  senna 2 Tablet(s) Oral at bedtime  tiotropium 2.5 MICROgram(s) Inhaler 2 Puff(s) Inhalation daily  valACYclovir 1000 milliGRAM(s) Oral three times a day  warfarin 6 milliGRAM(s) Oral once    MEDICATIONS  (PRN):  acetaminophen     Tablet .. 650 milliGRAM(s) Oral every 6 hours PRN Temp greater or equal to 38C (100.4F), Mild Pain (1 - 3)  simethicone 80 milliGRAM(s) Chew every 12 hours PRN Gas  traMADol 50 milliGRAM(s) Oral every 8 hours PRN Moderate Pain (4 - 6)      Vital Signs Last 24 Hrs  T(C): 36.6 (17 Sep 2023 12:14), Max: 36.8 (16 Sep 2023 20:12)  T(F): 97.9 (17 Sep 2023 12:14), Max: 98.2 (16 Sep 2023 20:12)  HR: 83 (17 Sep 2023 12:14) (76 - 99)  BP: 130/80 (17 Sep 2023 12:14) (126/73 - 145/73)  BP(mean): --  RR: 18 (17 Sep 2023 12:14) (18 - 18)  SpO2: 97% (17 Sep 2023 12:14) (95% - 97%)    Parameters below as of 17 Sep 2023 12:14  Patient On (Oxygen Delivery Method): room air      CAPILLARY BLOOD GLUCOSE      POCT Blood Glucose.: 77 mg/dL (17 Sep 2023 12:08)  POCT Blood Glucose.: 57 mg/dL (17 Sep 2023 11:44)  POCT Blood Glucose.: 62 mg/dL (17 Sep 2023 11:43)  POCT Blood Glucose.: 191 mg/dL (17 Sep 2023 07:35)  POCT Blood Glucose.: 130 mg/dL (17 Sep 2023 02:13)  POCT Blood Glucose.: 201 mg/dL (16 Sep 2023 21:10)  POCT Blood Glucose.: 308 mg/dL (16 Sep 2023 17:25)  POCT Blood Glucose.: 162 mg/dL (16 Sep 2023 13:07)    I&O's Summary    16 Sep 2023 07:01  -  17 Sep 2023 07:00  --------------------------------------------------------  IN: 480 mL / OUT: 0 mL / NET: 480 mL        PHYSICAL EXAM:  GENERAL: NAD, well-developed  HEAD:  Atraumatic, Normocephalic  EYES: EOMI, PERRLA, conjunctiva and sclera clear  NECK: Supple, No JVD  CHEST/LUNG: barry wheeze  HEART: Regular rate and rhythm; No murmurs, rubs, or gallops  ABDOMEN: Soft, Nontender, Nondistended; Bowel sounds present  EXTREMITIES:  2+ Peripheral Pulses, No clubbing, cyanosis, or edema  PSYCH: AAOx3  NEUROLOGY: non-focal  SKIN: No rashes or lesions    LABS:                        10.6   10.71 )-----------( 275      ( 16 Sep 2023 11:01 )             37.8     09-16    139  |  104  |  13  ----------------------------<  138<H>  4.1   |  22  |  0.62    Ca    8.9      16 Sep 2023 11:01      PT/INR - ( 17 Sep 2023 07:08 )   PT: 13.8 sec;   INR: 1.33 ratio         PTT - ( 16 Sep 2023 11:01 )  PTT:30.5 sec      Urinalysis Basic - ( 16 Sep 2023 11:01 )    Color: x / Appearance: x / SG: x / pH: x  Gluc: 138 mg/dL / Ketone: x  / Bili: x / Urobili: x   Blood: x / Protein: x / Nitrite: x   Leuk Esterase: x / RBC: x / WBC x   Sq Epi: x / Non Sq Epi: x / Bacteria: x        RADIOLOGY & ADDITIONAL TESTS:    Imaging Personally Reviewed:    Consultant(s) Notes Reviewed:      Care Discussed with Consultants/Other Providers:

## 2023-09-17 NOTE — CHART NOTE - NSCHARTNOTEFT_GEN_A_CORE
72 F w/h/o uncontrolled T2DM (A1C 9.4%) on basal/bolus insulin PTA. Unknown DM complications. Also COPD, secondary adrenal Insufficiency on chronic steroids, colorectal cancer s/p resection (colostomy bag), Chronic A fib on Eliquis, and tracheomalacia and multiple intubations, type A aortic dissection s/p aortic dissection repair on 9/07/22, sepsis. Pt well known to this provider from prior admissions. Pt recently admitted with SOB and found to have anemia and severe aortic stenosis>requiring valve in valve TAVR 9/7/23 discharged 9/10/23. Back with increasing productive cough, dyspnea and also found to have Herpes zoster on antiviral. Endocrine consult for management of hyperglycemia.     Home regimen: Lantus 18 untis at HS and Humalog 15 units ACTID    Methylprednisolone dose increased from 16 mg daily to 20 mg q8h today    Glucose dropped 191-->57 before lunch --> corrected to 77, 143      Problem/Plan - 1:  ·  Problem: Uncontrolled type 2 diabetes mellitus with hyperglycemia, with long-term current use of insulin.   ·  Plan: - Test BGs ACTID, hs and 2am OR q 6 hours if NPO  -increase lantus dose to 25 units at hs for now in anticipation of hyperglycemia with increased steroid dosing today.  -adjust admelog 7/9/9 units with meals. HOLD if NPO/skip meals. Anticipate she will need more admelog coverage for dinner but given low for lunch today will assess trend first before making further adjustments.  -Continue moderate admelog correction scale ACTID and moderate correction scale at HS and 2am in case of rebound hyperglycemia.   -Please f/u Hypoglycemia protocol and document treatment  -Please notify endocrine if changes to methylprednisolone doses as insulin doses will also need to be changed accordingly   D/C planning   Discharge:  Will be determined according to BG/insulin needs/PO intake and steroid therapy at time of discharge.  Plan to d/c on basal bolus. Doses TBD  Outpt. endo follow-up. Dr Saavedra  Outpt. optho, podiatry, micro/cr  Make sure pt has Rx for all DM supplies and insulin/ DM meds. Consider [employer] Luis E 3 if going home.    Ana Tracy MD  Attending Physician   Department of Endocrinology, Diabetes and Metabolism   Pager: [phone] or [employer] Teams on 09-17-23 @ 15:55    If before 9AM or after 5PM, or on weekends/holidays, please call the Endocrine answering service for assistance ([phone]).  For nonurgent matters, please email [email or web address] for assistance.

## 2023-09-17 NOTE — PROGRESS NOTE ADULT - ASSESSMENT
72 y.o. F with PMH of asthma on chronic steroids, bronchiectasis, allergic rhinitis,  recurrent PNA,  tracheomalacia s/p tracheoplasty, ESBL proteus infections (sputum),  diabetes, paroxysmal A-fib on coumadin, colorectal cancer many years ago status post colostomy, recent hospitalization at an outside hospital  for acute respiratory failure with hypoxia secondary to asthma/COPD exacerbation and bronchopneumonia 6/5/2023 - 6/16/2023), and AVR 2022 with Dr. Cabrera.  Pt admits to chronic SOB and cough for years and is up to date on vaccines. Dr. Allison follows as an outpatient for her COPD. s/p 9/6 TAVR- [facility] with Dr. Gonsalves and Dr. Leahy. discharged home 9/10. Pt presents to office today for f/u visit  w/ c/o of worsening SOB. Pt to be admitted for further workup and eval. Pt stable at this time. VSS; O2 sats 97% RA.  ,H/o  ,bronchiectasis, tracheomalacia s/p tracheoplasty who presents after a recent admission for bronchiectasis exacerbation (6/2023, treated with Ertapenem for ESBL proteus), and again in 9/2023 for an acute flare whose hospital course was complicated by identification of severe AS requiring valve in valve TAVR 9/7/23    bronchiectasis  9/13 Pulm eval /rec Routine sputum culture, urine strep/legionella, RVP, COVID  3 sputum AFB   Considering culture history would start ertapenem and vancomycin for MRSA   Bronchodilator: Standing duonebs q 4 hours, nebulized pulmicort  - change to iv steoids solumedrol 20 iv q 8  - vest therapy  - mucous for culture and sensitivity  - nebulized mucomyst  - Ensure she is getting twice per day airway clearance: Albuterol nebulizer followed by saline nebulizer followed by cough assist device and encouragement to cough and clear  - Pulmonary will continue to follow    F/u  cultures, ambulation.  Coumadin, h/o paf  D/c planning when stable    S/P TAVR (transcatheter aortic valve replacement).   ·  Plan: Structural Team Following   Continue with Plavix 75 mg PO daily   Continue with Mexiletine 200 mg every 8 hours   Continue with Atorvastatin 20 mg PO HS    Daily EKG   Increase activity as tolerated.   Encourage Chest PT / Pulmonary toileting and Incentive spirometry every 1 hour x 10 while awake.   Continue with PUD and DVT prophylaxis.   Daily Shower     shingles  - start valcyte 1 gram tic for 7 days      Uncontrolled type 2 diabetes mellitus with hyperglycemia.   ·  Plan: Test BG ac and hs and 2am if eating. Q6H while NPO  -restart Lantus 32 units sq hs   - restart  Admelog 15-16-18 units qac   Hold if NPO or eating less than 50% of meals.  -  moderate correction scale ac meals and moderate correction hs and 2am.      Plan to d/c on basal bolus.   Outpt. endo follow-up. Dr Saavedra  Outpt. optho, podiatry, micro/cr  Consider [employer] Luis E 3  upon discharge.    Essential hypertension.   ·  Plan: Goal BP <130/80   Manage per primary team.     Hyperlipidemia.   ·  Plan: LDL goal <70 due to DM  Pt LDL N/A  Order fasting lipid if not recently done  Statin as noted above   Manage per primary team   F/u levels as out pt.    H/O adrenal insufficiency.   ·  Plan: On chronic steroids at home > medrol 8mg qd from admission in 2022  Per pt she has been sick this year with multiple hospitalizations requiring pulse steroids. Was on Methylprednisolone 28mg PTA.   -Pt on slow titration back to Medrol 8mg qd  -c/w MTP 16mg   - Will need stress steroids if acutely ill or for OR. No need for stress steroid dose at this time since pt received Prednisone 50mg x3 prior to OR now on medrol 16 qd.    Atrial fibrillation.   ·  Plan: Continue with Mexiletine 200 mg every 8 hours   Coumadin 5 mg PO tonight for INR 1.46  Daily PT/ INR.

## 2023-09-18 LAB
ANION GAP SERPL CALC-SCNC: 13 MMOL/L — SIGNIFICANT CHANGE UP (ref 5–17)
BUN SERPL-MCNC: 17 MG/DL — SIGNIFICANT CHANGE UP (ref 7–23)
CALCIUM SERPL-MCNC: 8.9 MG/DL — SIGNIFICANT CHANGE UP (ref 8.4–10.5)
CHLORIDE SERPL-SCNC: 102 MMOL/L — SIGNIFICANT CHANGE UP (ref 96–108)
CO2 SERPL-SCNC: 24 MMOL/L — SIGNIFICANT CHANGE UP (ref 22–31)
CREAT SERPL-MCNC: 0.62 MG/DL — SIGNIFICANT CHANGE UP (ref 0.5–1.3)
EGFR: 93 ML/MIN/1.73M2 — SIGNIFICANT CHANGE UP
GLUCOSE BLDC GLUCOMTR-MCNC: 172 MG/DL — HIGH (ref 70–99)
GLUCOSE BLDC GLUCOMTR-MCNC: 207 MG/DL — HIGH (ref 70–99)
GLUCOSE BLDC GLUCOMTR-MCNC: 280 MG/DL — HIGH (ref 70–99)
GLUCOSE BLDC GLUCOMTR-MCNC: 283 MG/DL — HIGH (ref 70–99)
GLUCOSE BLDC GLUCOMTR-MCNC: 338 MG/DL — HIGH (ref 70–99)
GLUCOSE SERPL-MCNC: 295 MG/DL — HIGH (ref 70–99)
GRAM STN FLD: SIGNIFICANT CHANGE UP
HCT VFR BLD CALC: 33.5 % — LOW (ref 34.5–45)
HGB BLD-MCNC: 9.6 G/DL — LOW (ref 11.5–15.5)
INR BLD: 1.63 RATIO — HIGH (ref 0.85–1.18)
MCHC RBC-ENTMCNC: 23.2 PG — LOW (ref 27–34)
MCHC RBC-ENTMCNC: 28.7 GM/DL — LOW (ref 32–36)
MCV RBC AUTO: 81.1 FL — SIGNIFICANT CHANGE UP (ref 80–100)
NRBC # BLD: 0 /100 WBCS — SIGNIFICANT CHANGE UP (ref 0–0)
PLATELET # BLD AUTO: 345 K/UL — SIGNIFICANT CHANGE UP (ref 150–400)
POTASSIUM SERPL-MCNC: 4.6 MMOL/L — SIGNIFICANT CHANGE UP (ref 3.5–5.3)
POTASSIUM SERPL-SCNC: 4.6 MMOL/L — SIGNIFICANT CHANGE UP (ref 3.5–5.3)
PROTHROM AB SERPL-ACNC: 16.9 SEC — HIGH (ref 9.5–13)
RBC # BLD: 4.13 M/UL — SIGNIFICANT CHANGE UP (ref 3.8–5.2)
RBC # FLD: 24.7 % — HIGH (ref 10.3–14.5)
SODIUM SERPL-SCNC: 139 MMOL/L — SIGNIFICANT CHANGE UP (ref 135–145)
SPECIMEN SOURCE: SIGNIFICANT CHANGE UP
WBC # BLD: 11.54 K/UL — HIGH (ref 3.8–10.5)
WBC # FLD AUTO: 11.54 K/UL — HIGH (ref 3.8–10.5)

## 2023-09-18 PROCEDURE — 99232 SBSQ HOSP IP/OBS MODERATE 35: CPT

## 2023-09-18 RX ORDER — DIPHENHYDRAMINE HCL 50 MG
25 CAPSULE ORAL EVERY 4 HOURS
Refills: 0 | Status: DISCONTINUED | OUTPATIENT
Start: 2023-09-18 | End: 2023-10-01

## 2023-09-18 RX ORDER — WARFARIN SODIUM 2.5 MG/1
6 TABLET ORAL ONCE
Refills: 0 | Status: COMPLETED | OUTPATIENT
Start: 2023-09-18 | End: 2023-09-18

## 2023-09-18 RX ORDER — INSULIN LISPRO 100/ML
16 VIAL (ML) SUBCUTANEOUS
Refills: 0 | Status: DISCONTINUED | OUTPATIENT
Start: 2023-09-19 | End: 2023-09-19

## 2023-09-18 RX ORDER — INSULIN LISPRO 100/ML
12 VIAL (ML) SUBCUTANEOUS
Refills: 0 | Status: DISCONTINUED | OUTPATIENT
Start: 2023-09-18 | End: 2023-09-18

## 2023-09-18 RX ORDER — INSULIN LISPRO 100/ML
16 VIAL (ML) SUBCUTANEOUS
Refills: 0 | Status: DISCONTINUED | OUTPATIENT
Start: 2023-09-18 | End: 2023-09-19

## 2023-09-18 RX ORDER — INSULIN GLARGINE 100 [IU]/ML
30 INJECTION, SOLUTION SUBCUTANEOUS AT BEDTIME
Refills: 0 | Status: DISCONTINUED | OUTPATIENT
Start: 2023-09-18 | End: 2023-09-19

## 2023-09-18 RX ADMIN — Medication 600 MILLIGRAM(S): at 18:24

## 2023-09-18 RX ADMIN — Medication 2 MILLILITER(S): at 06:40

## 2023-09-18 RX ADMIN — Medication 16 UNIT(S): at 18:23

## 2023-09-18 RX ADMIN — Medication 12 UNIT(S): at 14:17

## 2023-09-18 RX ADMIN — GABAPENTIN 100 MILLIGRAM(S): 400 CAPSULE ORAL at 14:41

## 2023-09-18 RX ADMIN — Medication 4: at 02:32

## 2023-09-18 RX ADMIN — Medication 3 MILLILITER(S): at 14:17

## 2023-09-18 RX ADMIN — INSULIN GLARGINE 30 UNIT(S): 100 INJECTION, SOLUTION SUBCUTANEOUS at 21:55

## 2023-09-18 RX ADMIN — BUDESONIDE AND FORMOTEROL FUMARATE DIHYDRATE 2 PUFF(S): 160; 4.5 AEROSOL RESPIRATORY (INHALATION) at 18:24

## 2023-09-18 RX ADMIN — VALACYCLOVIR 1000 MILLIGRAM(S): 500 TABLET, FILM COATED ORAL at 21:56

## 2023-09-18 RX ADMIN — Medication 1 APPLICATION(S): at 18:24

## 2023-09-18 RX ADMIN — Medication 2 MILLILITER(S): at 14:39

## 2023-09-18 RX ADMIN — Medication 2 MILLILITER(S): at 21:57

## 2023-09-18 RX ADMIN — MEXILETINE HYDROCHLORIDE 200 MILLIGRAM(S): 150 CAPSULE ORAL at 14:40

## 2023-09-18 RX ADMIN — WARFARIN SODIUM 6 MILLIGRAM(S): 2.5 TABLET ORAL at 21:58

## 2023-09-18 RX ADMIN — MUPIROCIN 1 APPLICATION(S): 20 OINTMENT TOPICAL at 06:40

## 2023-09-18 RX ADMIN — FAMOTIDINE 20 MILLIGRAM(S): 10 INJECTION INTRAVENOUS at 06:40

## 2023-09-18 RX ADMIN — Medication 20 MILLIGRAM(S): at 14:39

## 2023-09-18 RX ADMIN — GABAPENTIN 100 MILLIGRAM(S): 400 CAPSULE ORAL at 06:41

## 2023-09-18 RX ADMIN — GABAPENTIN 100 MILLIGRAM(S): 400 CAPSULE ORAL at 21:56

## 2023-09-18 RX ADMIN — Medication 3 MILLILITER(S): at 06:40

## 2023-09-18 RX ADMIN — AZITHROMYCIN 255 MILLIGRAM(S): 500 TABLET, FILM COATED ORAL at 14:17

## 2023-09-18 RX ADMIN — POLYETHYLENE GLYCOL 3350 17 GRAM(S): 17 POWDER, FOR SOLUTION ORAL at 06:39

## 2023-09-18 RX ADMIN — TRAMADOL HYDROCHLORIDE 50 MILLIGRAM(S): 50 TABLET ORAL at 09:20

## 2023-09-18 RX ADMIN — Medication 20 MILLIGRAM(S): at 21:57

## 2023-09-18 RX ADMIN — MUPIROCIN 1 APPLICATION(S): 20 OINTMENT TOPICAL at 18:24

## 2023-09-18 RX ADMIN — Medication 20 MILLIGRAM(S): at 06:40

## 2023-09-18 RX ADMIN — TRAMADOL HYDROCHLORIDE 50 MILLIGRAM(S): 50 TABLET ORAL at 10:00

## 2023-09-18 RX ADMIN — Medication 6: at 08:26

## 2023-09-18 RX ADMIN — CHLORHEXIDINE GLUCONATE 1 APPLICATION(S): 213 SOLUTION TOPICAL at 06:42

## 2023-09-18 RX ADMIN — MEXILETINE HYDROCHLORIDE 200 MILLIGRAM(S): 150 CAPSULE ORAL at 06:41

## 2023-09-18 RX ADMIN — Medication 3 MILLILITER(S): at 18:24

## 2023-09-18 RX ADMIN — TIOTROPIUM BROMIDE 2 PUFF(S): 18 CAPSULE ORAL; RESPIRATORY (INHALATION) at 14:18

## 2023-09-18 RX ADMIN — VALACYCLOVIR 1000 MILLIGRAM(S): 500 TABLET, FILM COATED ORAL at 06:40

## 2023-09-18 RX ADMIN — Medication 1 APPLICATION(S): at 06:41

## 2023-09-18 RX ADMIN — Medication 7 UNIT(S): at 08:27

## 2023-09-18 RX ADMIN — Medication 6: at 14:17

## 2023-09-18 RX ADMIN — Medication 4: at 18:22

## 2023-09-18 RX ADMIN — Medication 500 MILLIGRAM(S): at 14:40

## 2023-09-18 RX ADMIN — Medication 1 TABLET(S): at 14:40

## 2023-09-18 RX ADMIN — Medication 600 MILLIGRAM(S): at 06:43

## 2023-09-18 RX ADMIN — Medication 3 MILLILITER(S): at 02:31

## 2023-09-18 RX ADMIN — VALACYCLOVIR 1000 MILLIGRAM(S): 500 TABLET, FILM COATED ORAL at 14:18

## 2023-09-18 RX ADMIN — CLOPIDOGREL BISULFATE 75 MILLIGRAM(S): 75 TABLET, FILM COATED ORAL at 14:41

## 2023-09-18 RX ADMIN — Medication 25 MILLIGRAM(S): at 14:38

## 2023-09-18 RX ADMIN — MEXILETINE HYDROCHLORIDE 200 MILLIGRAM(S): 150 CAPSULE ORAL at 21:56

## 2023-09-18 RX ADMIN — ATORVASTATIN CALCIUM 20 MILLIGRAM(S): 80 TABLET, FILM COATED ORAL at 21:56

## 2023-09-18 RX ADMIN — BUDESONIDE AND FORMOTEROL FUMARATE DIHYDRATE 2 PUFF(S): 160; 4.5 AEROSOL RESPIRATORY (INHALATION) at 06:39

## 2023-09-18 RX ADMIN — SENNA PLUS 2 TABLET(S): 8.6 TABLET ORAL at 21:58

## 2023-09-18 RX ADMIN — FAMOTIDINE 20 MILLIGRAM(S): 10 INJECTION INTRAVENOUS at 18:24

## 2023-09-18 NOTE — PROGRESS NOTE ADULT - ASSESSMENT
71 y/o F w/severe persistent asthma and bronchiectasis, tracheobronchomalica s/p tracheoplasty, valvular heart disease s/p valve in valve heart disease admitted for productive cough and also found to have shingles flare. Possible bronchiectasis exacerbation vs viral infection vs pulmonary edema.    - Follow up sputum cx results  - Abx as per ID  - Continue steroids, bronchodilators, airway clearance  - Please check BNP

## 2023-09-18 NOTE — PROGRESS NOTE ADULT - ASSESSMENT
72 F w/h/o uncontrolled T2DM (A1C 9.4%) on basal/bolus insulin PTA. Unknown DM complications. Also COPD, secondary adrenal Insufficiency on chronic steroids, colorectal cancer s/p resection (colostomy bag), Chronic A fib on Eliquis, and tracheomalacia and multiple intubations, type A aortic dissection s/p aortic dissection repair on 9/07/22, sepsis. Pt well known to this provider from prior admissions. Pt recently admitted with SOB and found to have anemia and severe aortic stenosis>requiring valve in valve TAVR 9/7/23 discharged 9/10/23. Back with increasing productive cough, dyspnea and also found to have Herpes zoster on antiviral. Endocrine consult for management of hyperglycemia. BG goal (100-180mg/dl).   BGs variable- hypoglycemia episode yesterday at lunch time after receiving premeal insulin without having full meal for breakfast. Hyperglycemia noted since Methylprednisone 20 mg IV X3/day started. Will adjust insulin dose.        Home regimen: Lantus 18 untis at HS and Humalog 15 units ACTID    Problem/Plan - 1:  ·  Problem: Uncontrolled type 2 diabetes mellitus with hyperglycemia, with long-term current use of insulin.   ·  Plan: - Test BGs ACTID, hs and 2am OR q 6 hours if NPO  -increase lantus dose to 25 units at hs for now in anticipation of hyperglycemia with increased steroid dosing today.  -adjust admelog 7/9/9 units with meals. HOLD if NPO/skip meals. Anticipate she will need more admelog coverage for dinner but given low for lunch today will assess trend first before making further adjustments.  -Continue moderate admelog correction scale ACTID and moderate correction scale at HS and 2am in case of rebound hyperglycemia.   -Please f/u Hypoglycemia protocol and document treatment  -Please notify endocrine if changes to methylprednisolone doses as insulin doses will also need to be changed accordingly   D/C planning   Discharge:  Will be determined according to BG/insulin needs/PO intake and steroid therapy at time of discharge.  Plan to d/c on basal bolus. Doses TBD  Outpt. endo follow-up. Dr Saavedra  Outpt. optho, podiatry, micro/cr  Make sure pt has Rx for all DM supplies and insulin/ DM meds. Consider [employer] Luis E 3 if going home.    Ana Tracy MD  Attending Physician   Department of Endocrinology, Diabetes and Metabolism   Pager: [phone] or [employer] Teams on 09-17-23 @ 15:55    If before 9AM or after 5PM, or on weekends/holidays, please call the Endocrine answering service for assistance ([phone]).  For nonurgent matters, please email [email or web address] for assistance.

## 2023-09-18 NOTE — PROGRESS NOTE ADULT - SUBJECTIVE AND OBJECTIVE BOX
CHIEF COMPLAINT: Buttock pain    Interval Events: No acute events. No chest pain or dyspnea. Continues to have cough now productive of yellowish sputum. No blood. No fevers, chills, nausea, emesis, or diarrhea. Continues to have buttock pain from shingles.     REVIEW OF SYSTEMS:  See above. ROS otherwise negative.     OBJECTIVE:  ICU Vital Signs Last 24 Hrs  T(C): 36.6 (18 Sep 2023 04:00), Max: 36.8 (17 Sep 2023 20:55)  T(F): 97.9 (18 Sep 2023 04:00), Max: 98.3 (17 Sep 2023 20:55)  HR: 92 (18 Sep 2023 04:00) (76 - 92)  BP: 132/75 (18 Sep 2023 04:00) (130/80 - 139/74)  BP(mean): --  ABP: --  ABP(mean): --  RR: 18 (18 Sep 2023 04:00) (18 - 18)  SpO2: 98% (18 Sep 2023 04:00) (94% - 98%)    O2 Parameters below as of 18 Sep 2023 04:00  Patient On (Oxygen Delivery Method): room air              09-17 @ 07:01  -  09-18 @ 07:00  --------------------------------------------------------  IN: 480 mL / OUT: 0 mL / NET: 480 mL      CAPILLARY BLOOD GLUCOSE      POCT Blood Glucose.: 280 mg/dL (18 Sep 2023 08:13)      PHYSICAL EXAM:  General: Elderly female lying in bed, NAD  HEENT: NC/AT sclerae anicteric  Neck: Supple  Respiratory: No increased WOB, able to speak full sentences, ronchorous breath sounds b/l  Cardiovascular: S1, S2  Abdomen: Soft, + BS  Extremities: WWP  Neurological: Awake, alert, follows commands  Psychiatry: Appropriate affect    HOSPITAL MEDICATIONS:  MEDICATIONS  (STANDING):  acetylcysteine 10%  Inhalation 2 milliLiter(s) Inhalation every 8 hours  albuterol/ipratropium for Nebulization 3 milliLiter(s) Nebulizer every 6 hours  ascorbic acid 500 milliGRAM(s) Oral daily  atorvastatin 20 milliGRAM(s) Oral at bedtime  azithromycin  IVPB 500 milliGRAM(s) IV Intermittent every 24 hours  azithromycin  IVPB      budesonide  80 MICROgram(s)/formoterol 4.5 MICROgram(s) Inhaler 2 Puff(s) Inhalation two times a day  chlorhexidine 2% Cloths 1 Application(s) Topical <User Schedule>  clopidogrel Tablet 75 milliGRAM(s) Oral daily  famotidine    Tablet 20 milliGRAM(s) Oral two times a day  gabapentin 100 milliGRAM(s) Oral every 8 hours  guaiFENesin  milliGRAM(s) Oral every 12 hours  hydrocortisone 1% Cream 1 Application(s) Topical two times a day  insulin glargine Injectable (LANTUS) 25 Unit(s) SubCutaneous at bedtime  insulin lispro (ADMELOG) corrective regimen sliding scale   SubCutaneous three times a day before meals  insulin lispro (ADMELOG) corrective regimen sliding scale   SubCutaneous <User Schedule>  insulin lispro Injectable (ADMELOG) 7 Unit(s) SubCutaneous before breakfast  insulin lispro Injectable (ADMELOG) 9 Unit(s) SubCutaneous before dinner  insulin lispro Injectable (ADMELOG) 12 Unit(s) SubCutaneous before lunch  lidocaine   4% Patch 2 Patch Transdermal daily  methylPREDNISolone sodium succinate Injectable 20 milliGRAM(s) IV Push every 8 hours  mexiletine 200 milliGRAM(s) Oral every 8 hours  multivitamin 1 Tablet(s) Oral daily  mupirocin 2% Nasal 1 Application(s) Both Nostrils two times a day  polyethylene glycol 3350 17 Gram(s) Oral daily  polyethylene glycol 3350 17 Gram(s) Oral two times a day  senna 2 Tablet(s) Oral at bedtime  tiotropium 2.5 MICROgram(s) Inhaler 2 Puff(s) Inhalation daily  valACYclovir 1000 milliGRAM(s) Oral three times a day  warfarin 6 milliGRAM(s) Oral once    MEDICATIONS  (PRN):  acetaminophen     Tablet .. 650 milliGRAM(s) Oral every 6 hours PRN Temp greater or equal to 38C (100.4F), Mild Pain (1 - 3)  simethicone 80 milliGRAM(s) Chew every 12 hours PRN Gas  traMADol 50 milliGRAM(s) Oral every 8 hours PRN Moderate Pain (4 - 6)      LABS:                        9.6    11.54 )-----------( 345      ( 18 Sep 2023 07:19 )             33.5     Hgb Trend: 9.6<--, 10.6<--, 9.8<--, 10.0<--, 10.0<--  09-18    139  |  102  |  17  ----------------------------<  295<H>  4.6   |  24  |  0.62    Ca    8.9      18 Sep 2023 07:18      Creatinine Trend: 0.62<--, 0.62<--, 0.72<--, 0.64<--, 0.71<--, 0.65<--  PT/INR - ( 18 Sep 2023 07:20 )   PT: 16.9 sec;   INR: 1.63 ratio           Urinalysis Basic - ( 18 Sep 2023 07:18 )    Color: x / Appearance: x / SG: x / pH: x  Gluc: 295 mg/dL / Ketone: x  / Bili: x / Urobili: x   Blood: x / Protein: x / Nitrite: x   Leuk Esterase: x / RBC: x / WBC x   Sq Epi: x / Non Sq Epi: x / Bacteria: x            MICROBIOLOGY:     Culture - Sputum (collected 18 Sep 2023 00:30)  Source: .Sputum Sputum  Gram Stain (18 Sep 2023 05:31):    Few-moderate polymorphonuclear leukocytes per low power field    Few Squamous epithelial cells per low power field    Few-moderate Gram positive cocci in pairs seen per oil power field    Few Gram Positive Rods seen per oil power field    Few Gram NegativeRods seen per oil power field    Few Yeast like cells seen per oil power field        RADIOLOGY:  [x ] Reviewed and interpreted by me    PULMONARY FUNCTION TESTS:    EKG:

## 2023-09-18 NOTE — PROGRESS NOTE ADULT - ASSESSMENT
72 y.o. F with PMH of asthma on chronic steroids, bronchiectasis, allergic rhinitis,  recurrent PNA,  tracheomalacia s/p tracheoplasty, ESBL proteus infections (sputum),  diabetes, paroxysmal A-fib on coumadin, colorectal cancer many years ago status post colostomy, recent hospitalization at an outside hospital  for acute respiratory failure with hypoxia secondary to asthma/COPD exacerbation and bronchopneumonia 6/5/2023 - 6/16/2023), and AVR 2022 with Dr. Cabrera.  Pt admits to chronic SOB and cough for years and is up to date on vaccines. Dr. Allison follows as an outpatient for her COPD. s/p 9/6 TAVR- [facility] with Dr. Gonsalves and Dr. Leahy. discharged home 9/10. Pt presents to office today for f/u visit  w/ c/o of worsening SOB. Pt to be admitted for further workup and eval. Pt stable at this time. VSS; O2 sats 97% RA.  ,H/o  ,bronchiectasis, tracheomalacia s/p tracheoplasty who presents after a recent admission for bronchiectasis exacerbation (6/2023, treated with Ertapenem for ESBL proteus), and again in 9/2023 for an acute flare whose hospital course was complicated by identification of severe AS requiring valve in valve TAVR 9/7/23    bronchiectasis  9/13 Pulm eval /rec Routine sputum culture, urine strep/legionella, RVP, COVID  3 sputum AFB   Considering culture history would start ertapenem and vancomycin for MRSA   Bronchodilator: Standing duonebs q 4 hours, nebulized pulmicort  - change to iv steroids solumedrol 20 iv q 8  - vest therapy  - mucous for culture and sensitivity  - nebulized mucomyst  - Ensure she is getting twice per day airway clearance: Albuterol nebulizer followed by saline nebulizer followed by cough assist device and encouragement to cough and clear  - Pulmonary will continue to follow  - F/u  cultures, ambulation.      S/P TAVR (transcatheter aortic valve replacement).   ·  Plan: Structural Team Following   Continue with Plavix 75 mg PO daily   Continue with Mexiletine 200 mg every 8 hours   Continue with Atorvastatin 20 mg PO HS    Daily EKG   Increase activity as tolerated.   Encourage Chest PT / Pulmonary toileting and Incentive spirometry every 1 hour x 10 while awake.   Continue with PUD and DVT prophylaxis.   Daily Shower     shingles  - valcyte 1 gram tic for 7 days      Uncontrolled type 2 diabetes mellitus with hyperglycemia.   ·  Plan: Test BG ac and hs and 2am if eating. Q6H while NPO  -restart Lantus 32 units sq hs   - restart  Admelog 15-16-18 units qac   Hold if NPO or eating less than 50% of meals.  -  moderate correction scale ac meals and moderate correction hs and 2am.      Plan to d/c on basal bolus.   Outpt. endo follow-up. Dr Saavedra  Outpt. optho, podiatry, micro/cr  Consider Frees[employer]le Luis E 3  upon discharge.    Essential hypertension.   ·  Plan: Goal BP <130/80   Manage per primary team.     Hyperlipidemia.   ·  Plan: LDL goal <70 due to DM  Pt LDL N/A  Order fasting lipid if not recently done  Statin as noted above   Manage per primary team   F/u levels as out pt.    H/O adrenal insufficiency.   ·  Plan: On chronic steroids at home > medrol 8mg qd from admission in 2022  Per pt she has been sick this year with multiple hospitalizations requiring pulse steroids. Was on Methylprednisolone 28mg PTA.   -Pt on slow titration back to Medrol 8mg qd  -c/w MTP 16mg   - Will need stress steroids if acutely ill or for OR. No need for stress steroid dose at this time since pt received Prednisone 50mg x3 prior to OR now on medrol 16 qd.    Atrial fibrillation.   ·  Plan: Continue with Mexiletine 200 mg every 8 hours   Coumadin 5 mg PO tonight for INR 1.46  Daily PT/ INR.

## 2023-09-18 NOTE — PROGRESS NOTE ADULT - SUBJECTIVE AND OBJECTIVE BOX
DATE OF SERVICE: 09-18-23 @ 10:52    Patient is a 72y old  Female who presents with a chief complaint of sob (17 Sep 2023 12:29)      SUBJECTIVE / OVERNIGHT EVENTS:  No chest pain. No shortness of breath. No new complaints. No events overnight.     MEDICATIONS  (STANDING):  acetylcysteine 10%  Inhalation 2 milliLiter(s) Inhalation every 8 hours  albuterol/ipratropium for Nebulization 3 milliLiter(s) Nebulizer every 6 hours  ascorbic acid 500 milliGRAM(s) Oral daily  atorvastatin 20 milliGRAM(s) Oral at bedtime  azithromycin  IVPB      azithromycin  IVPB 500 milliGRAM(s) IV Intermittent every 24 hours  budesonide  80 MICROgram(s)/formoterol 4.5 MICROgram(s) Inhaler 2 Puff(s) Inhalation two times a day  chlorhexidine 2% Cloths 1 Application(s) Topical <User Schedule>  clopidogrel Tablet 75 milliGRAM(s) Oral daily  famotidine    Tablet 20 milliGRAM(s) Oral two times a day  gabapentin 100 milliGRAM(s) Oral every 8 hours  guaiFENesin  milliGRAM(s) Oral every 12 hours  hydrocortisone 1% Cream 1 Application(s) Topical two times a day  insulin glargine Injectable (LANTUS) 25 Unit(s) SubCutaneous at bedtime  insulin lispro (ADMELOG) corrective regimen sliding scale   SubCutaneous <User Schedule>  insulin lispro (ADMELOG) corrective regimen sliding scale   SubCutaneous three times a day before meals  insulin lispro Injectable (ADMELOG) 7 Unit(s) SubCutaneous before breakfast  insulin lispro Injectable (ADMELOG) 9 Unit(s) SubCutaneous before dinner  insulin lispro Injectable (ADMELOG) 12 Unit(s) SubCutaneous before lunch  lidocaine   4% Patch 2 Patch Transdermal daily  methylPREDNISolone sodium succinate Injectable 20 milliGRAM(s) IV Push every 8 hours  mexiletine 200 milliGRAM(s) Oral every 8 hours  multivitamin 1 Tablet(s) Oral daily  mupirocin 2% Nasal 1 Application(s) Both Nostrils two times a day  polyethylene glycol 3350 17 Gram(s) Oral two times a day  polyethylene glycol 3350 17 Gram(s) Oral daily  senna 2 Tablet(s) Oral at bedtime  tiotropium 2.5 MICROgram(s) Inhaler 2 Puff(s) Inhalation daily  valACYclovir 1000 milliGRAM(s) Oral three times a day  warfarin 6 milliGRAM(s) Oral once    MEDICATIONS  (PRN):  acetaminophen     Tablet .. 650 milliGRAM(s) Oral every 6 hours PRN Temp greater or equal to 38C (100.4F), Mild Pain (1 - 3)  simethicone 80 milliGRAM(s) Chew every 12 hours PRN Gas  traMADol 50 milliGRAM(s) Oral every 8 hours PRN Moderate Pain (4 - 6)      Vital Signs Last 24 Hrs  T(C): 36.6 (18 Sep 2023 04:00), Max: 36.8 (17 Sep 2023 20:55)  T(F): 97.9 (18 Sep 2023 04:00), Max: 98.3 (17 Sep 2023 20:55)  HR: 92 (18 Sep 2023 04:00) (76 - 92)  BP: 132/75 (18 Sep 2023 04:00) (130/80 - 139/74)  BP(mean): --  RR: 18 (18 Sep 2023 04:00) (18 - 18)  SpO2: 98% (18 Sep 2023 04:00) (94% - 98%)    Parameters below as of 18 Sep 2023 04:00  Patient On (Oxygen Delivery Method): room air      CAPILLARY BLOOD GLUCOSE      POCT Blood Glucose.: 280 mg/dL (18 Sep 2023 08:13)  POCT Blood Glucose.: 338 mg/dL (18 Sep 2023 02:30)  POCT Blood Glucose.: 243 mg/dL (17 Sep 2023 22:25)  POCT Blood Glucose.: 316 mg/dL (17 Sep 2023 20:52)  POCT Blood Glucose.: 349 mg/dL (17 Sep 2023 16:56)  POCT Blood Glucose.: 143 mg/dL (17 Sep 2023 13:14)  POCT Blood Glucose.: 77 mg/dL (17 Sep 2023 12:08)  POCT Blood Glucose.: 57 mg/dL (17 Sep 2023 11:44)  POCT Blood Glucose.: 62 mg/dL (17 Sep 2023 11:43)    I&O's Summary    17 Sep 2023 07:01  -  18 Sep 2023 07:00  --------------------------------------------------------  IN: 480 mL / OUT: 0 mL / NET: 480 mL        PHYSICAL EXAM:  GENERAL: NAD, well-developed  HEAD:  Atraumatic, Normocephalic  EYES: EOMI, PERRLA, conjunctiva and sclera clear  NECK: Supple, No JVD  CHEST/LUNG: Clear to auscultation bilaterally; No wheeze  HEART: Regular rate and rhythm; No murmurs, rubs, or gallops  ABDOMEN: Soft, Nontender, Nondistended; Bowel sounds present  EXTREMITIES:  2+ Peripheral Pulses, No clubbing, cyanosis, or edema  PSYCH: AAOx3  NEUROLOGY: non-focal  SKIN: No rashes or lesions    LABS:                        9.6    11.54 )-----------( 345      ( 18 Sep 2023 07:19 )             33.5     09-18    139  |  102  |  17  ----------------------------<  295<H>  4.6   |  24  |  0.62    Ca    8.9      18 Sep 2023 07:18      PT/INR - ( 18 Sep 2023 07:20 )   PT: 16.9 sec;   INR: 1.63 ratio         PTT - ( 16 Sep 2023 11:01 )  PTT:30.5 sec      Urinalysis Basic - ( 18 Sep 2023 07:18 )    Color: x / Appearance: x / SG: x / pH: x  Gluc: 295 mg/dL / Ketone: x  / Bili: x / Urobili: x   Blood: x / Protein: x / Nitrite: x   Leuk Esterase: x / RBC: x / WBC x   Sq Epi: x / Non Sq Epi: x / Bacteria: x        RADIOLOGY & ADDITIONAL TESTS:    Imaging Personally Reviewed:    Consultant(s) Notes Reviewed:      Care Discussed with Consultants/Other Providers:   DATE OF SERVICE: 09-18-23 @ 10:52    Patient is a 72y old  Female who presents with a chief complaint of sob (17 Sep 2023 12:29)      SUBJECTIVE / OVERNIGHT EVENTS:  No chest pain. No shortness of breath. No new complaints. No events overnight.     MEDICATIONS  (STANDING):  acetylcysteine 10%  Inhalation 2 milliLiter(s) Inhalation every 8 hours  albuterol/ipratropium for Nebulization 3 milliLiter(s) Nebulizer every 6 hours  ascorbic acid 500 milliGRAM(s) Oral daily  atorvastatin 20 milliGRAM(s) Oral at bedtime  azithromycin  IVPB      azithromycin  IVPB 500 milliGRAM(s) IV Intermittent every 24 hours  budesonide  80 MICROgram(s)/formoterol 4.5 MICROgram(s) Inhaler 2 Puff(s) Inhalation two times a day  chlorhexidine 2% Cloths 1 Application(s) Topical <User Schedule>  clopidogrel Tablet 75 milliGRAM(s) Oral daily  famotidine    Tablet 20 milliGRAM(s) Oral two times a day  gabapentin 100 milliGRAM(s) Oral every 8 hours  guaiFENesin  milliGRAM(s) Oral every 12 hours  hydrocortisone 1% Cream 1 Application(s) Topical two times a day  insulin glargine Injectable (LANTUS) 25 Unit(s) SubCutaneous at bedtime  insulin lispro (ADMELOG) corrective regimen sliding scale   SubCutaneous <User Schedule>  insulin lispro (ADMELOG) corrective regimen sliding scale   SubCutaneous three times a day before meals  insulin lispro Injectable (ADMELOG) 7 Unit(s) SubCutaneous before breakfast  insulin lispro Injectable (ADMELOG) 9 Unit(s) SubCutaneous before dinner  insulin lispro Injectable (ADMELOG) 12 Unit(s) SubCutaneous before lunch  lidocaine   4% Patch 2 Patch Transdermal daily  methylPREDNISolone sodium succinate Injectable 20 milliGRAM(s) IV Push every 8 hours  mexiletine 200 milliGRAM(s) Oral every 8 hours  multivitamin 1 Tablet(s) Oral daily  mupirocin 2% Nasal 1 Application(s) Both Nostrils two times a day  polyethylene glycol 3350 17 Gram(s) Oral two times a day  polyethylene glycol 3350 17 Gram(s) Oral daily  senna 2 Tablet(s) Oral at bedtime  tiotropium 2.5 MICROgram(s) Inhaler 2 Puff(s) Inhalation daily  valACYclovir 1000 milliGRAM(s) Oral three times a day  warfarin 6 milliGRAM(s) Oral once    MEDICATIONS  (PRN):  acetaminophen     Tablet .. 650 milliGRAM(s) Oral every 6 hours PRN Temp greater or equal to 38C (100.4F), Mild Pain (1 - 3)  simethicone 80 milliGRAM(s) Chew every 12 hours PRN Gas  traMADol 50 milliGRAM(s) Oral every 8 hours PRN Moderate Pain (4 - 6)      Vital Signs Last 24 Hrs  T(C): 36.6 (18 Sep 2023 04:00), Max: 36.8 (17 Sep 2023 20:55)  T(F): 97.9 (18 Sep 2023 04:00), Max: 98.3 (17 Sep 2023 20:55)  HR: 92 (18 Sep 2023 04:00) (76 - 92)  BP: 132/75 (18 Sep 2023 04:00) (130/80 - 139/74)  BP(mean): --  RR: 18 (18 Sep 2023 04:00) (18 - 18)  SpO2: 98% (18 Sep 2023 04:00) (94% - 98%)    Parameters below as of 18 Sep 2023 04:00  Patient On (Oxygen Delivery Method): room air      CAPILLARY BLOOD GLUCOSE      POCT Blood Glucose.: 280 mg/dL (18 Sep 2023 08:13)  POCT Blood Glucose.: 338 mg/dL (18 Sep 2023 02:30)  POCT Blood Glucose.: 243 mg/dL (17 Sep 2023 22:25)  POCT Blood Glucose.: 316 mg/dL (17 Sep 2023 20:52)  POCT Blood Glucose.: 349 mg/dL (17 Sep 2023 16:56)  POCT Blood Glucose.: 143 mg/dL (17 Sep 2023 13:14)  POCT Blood Glucose.: 77 mg/dL (17 Sep 2023 12:08)  POCT Blood Glucose.: 57 mg/dL (17 Sep 2023 11:44)  POCT Blood Glucose.: 62 mg/dL (17 Sep 2023 11:43)    I&O's Summary    17 Sep 2023 07:01  -  18 Sep 2023 07:00  --------------------------------------------------------  IN: 480 mL / OUT: 0 mL / NET: 480 mL        PHYSICAL EXAM:  GENERAL: NAD, well-developed  HEAD:  Atraumatic, Normocephalic  EYES: EOMI, PERRLA, conjunctiva and sclera clear  NECK: Supple, No JVD  CHEST/LUNG: barry wheezing and thonchi  HEART: Regular rate and rhythm; No murmurs, rubs, or gallops  ABDOMEN: Soft, Nontender, Nondistended; Bowel sounds present  EXTREMITIES:  2+ Peripheral Pulses, No clubbing, cyanosis, or edema  PSYCH: AAOx3  NEUROLOGY: non-focal  SKIN: No rashes or lesions    LABS:                        9.6    11.54 )-----------( 345      ( 18 Sep 2023 07:19 )             33.5     09-18    139  |  102  |  17  ----------------------------<  295<H>  4.6   |  24  |  0.62    Ca    8.9      18 Sep 2023 07:18      PT/INR - ( 18 Sep 2023 07:20 )   PT: 16.9 sec;   INR: 1.63 ratio         PTT - ( 16 Sep 2023 11:01 )  PTT:30.5 sec      Urinalysis Basic - ( 18 Sep 2023 07:18 )    Color: x / Appearance: x / SG: x / pH: x  Gluc: 295 mg/dL / Ketone: x  / Bili: x / Urobili: x   Blood: x / Protein: x / Nitrite: x   Leuk Esterase: x / RBC: x / WBC x   Sq Epi: x / Non Sq Epi: x / Bacteria: x        RADIOLOGY & ADDITIONAL TESTS:    Imaging Personally Reviewed:    Consultant(s) Notes Reviewed:      Care Discussed with Consultants/Other Providers:

## 2023-09-18 NOTE — PROGRESS NOTE ADULT - SUBJECTIVE AND OBJECTIVE BOX
Seen earlier today     Chief Complaint: Diabetes Mellitus follow up    INTERVAL HX: Tolerating POs, Eats full meals most of the time as per pt. BGs variable 62 to 300s. Hypoglycemia episode ( BG 62) yesterday at lunch after receiving meal time insulin without eating full meal. Hyperglycemia BGs in 200s-300s since IV methylprednisolone 20 mg TID started yesterday       Review of Systems:  General: As above  GI: No nausea, vomiting  Endocrine: no  S&Sx of hypoglycemia    Allergies    tetanus toxoid (Short breath)  Dilaudid (Short breath)  codeine (Short breath)  iodine (Short breath; Swelling)  Avelox (Short breath; Pruritus)  shellfish (Anaphylaxis)  cefepime (Anaphylaxis)  aspirin (Short breath)  Valium (Short breath)  penicillin (Anaphylaxis)    Intolerances      MEDICATIONS  (STANDING):  atorvastatin 20 milliGRAM(s) Oral at bedtime  azithromycin  IVPB      azithromycin  IVPB 500 milliGRAM(s) IV Intermittent every 24 hours  budesonide  80 MICROgram(s)/formoterol 4.5 MICROgram(s) Inhaler 2 Puff(s) Inhalation two times a day  hydrocortisone 1% Cream 1 Application(s) Topical two times a day  insulin glargine Injectable (LANTUS) 25 Unit(s) SubCutaneous at bedtime  insulin lispro (ADMELOG) corrective regimen sliding scale   SubCutaneous three times a day before meals  insulin lispro (ADMELOG) corrective regimen sliding scale   SubCutaneous <User Schedule>  insulin lispro Injectable (ADMELOG) 12 Unit(s) SubCutaneous before lunch  insulin lispro Injectable (ADMELOG) 16 Unit(s) SubCutaneous before dinner  lidocaine   4% Patch 2 Patch Transdermal daily  methylPREDNISolone sodium succinate Injectable 20 milliGRAM(s) IV Push every 8 hours  valACYclovir 1000 milliGRAM(s) Oral three times a day    atorvastatin   20 milliGRAM(s) Oral (09-17-23 @ 22:27)    insulin glargine Injectable (LANTUS)   25 Unit(s) SubCutaneous (09-17-23 @ 22:28)    insulin lispro (ADMELOG) corrective regimen sliding scale   4 Unit(s) SubCutaneous (09-18-23 @ 02:32)   0 Unit(s) SubCutaneous (09-17-23 @ 22:28)    insulin lispro (ADMELOG) corrective regimen sliding scale   4 Unit(s) SubCutaneous (09-18-23 @ 18:22)   6 Unit(s) SubCutaneous (09-18-23 @ 14:17)   6 Unit(s) SubCutaneous (09-18-23 @ 08:26)    insulin lispro Injectable (ADMELOG)   7 Unit(s) SubCutaneous (09-18-23 @ 08:27)    insulin lispro Injectable (ADMELOG)   12 Unit(s) SubCutaneous (09-18-23 @ 14:17)    insulin lispro Injectable (ADMELOG)   16 Unit(s) SubCutaneous (09-18-23 @ 18:23)    methylPREDNISolone sodium succinate Injectable   20 milliGRAM(s) IV Push (09-18-23 @ 14:39)   20 milliGRAM(s) IV Push (09-18-23 @ 06:40)   20 milliGRAM(s) IV Push (09-17-23 @ 22:26)        PHYSICAL EXAM:  VITALS: T(C): 36.6 (09-18-23 @ 04:00)  T(F): 97.9 (09-18-23 @ 04:00), Max: 98.3 (09-17-23 @ 20:55)  HR: 80 (09-18-23 @ 17:08) (77 - 92)  BP: 128/71 (09-18-23 @ 17:08) (128/71 - 137/77)  RR:  (18 - 18)  SpO2:  (95% - 98%)  Wt(kg): --  GENERAL: Female sitting in chair,  in NAD  Respiratory: Respirations unlabored   Extremities: Warm, no edema  NEURO: Alert , appropriate     LABS:  POCT Blood Glucose.: 207 mg/dL (09-18-23 @ 18:09)  POCT Blood Glucose.: 283 mg/dL (09-18-23 @ 13:53)  POCT Blood Glucose.: 280 mg/dL (09-18-23 @ 08:13)  POCT Blood Glucose.: 338 mg/dL (09-18-23 @ 02:30)  POCT Blood Glucose.: 243 mg/dL (09-17-23 @ 22:25)  POCT Blood Glucose.: 316 mg/dL (09-17-23 @ 20:52)  POCT Blood Glucose.: 349 mg/dL (09-17-23 @ 16:56)  POCT Blood Glucose.: 143 mg/dL (09-17-23 @ 13:14)  POCT Blood Glucose.: 77 mg/dL (09-17-23 @ 12:08)  POCT Blood Glucose.: 57 mg/dL (09-17-23 @ 11:44)  POCT Blood Glucose.: 62 mg/dL (09-17-23 @ 11:43)  POCT Blood Glucose.: 191 mg/dL (09-17-23 @ 07:35)  POCT Blood Glucose.: 130 mg/dL (09-17-23 @ 02:13)  POCT Blood Glucose.: 201 mg/dL (09-16-23 @ 21:10)  POCT Blood Glucose.: 308 mg/dL (09-16-23 @ 17:25)  POCT Blood Glucose.: 162 mg/dL (09-16-23 @ 13:07)  POCT Blood Glucose.: 215 mg/dL (09-16-23 @ 08:15)  POCT Blood Glucose.: 95 mg/dL (09-16-23 @ 02:08)  POCT Blood Glucose.: 139 mg/dL (09-15-23 @ 21:24)                          9.6    11.54 )-----------( 345      ( 18 Sep 2023 07:19 )             33.5     09-18    139  |  102  |  17  ----------------------------<  295<H>  4.6   |  24  |  0.62    Ca    8.9      18 Sep 2023 07:18      eGFR: 93 mL/min/1.73m2 (18 Sep 2023 07:18)      Thyroid Function Tests:  09-06 @ 09:32 TSH 0.49 FreeT4 1.2 T3 82 Anti TPO -- Anti Thyroglobulin Ab -- TSI --      A1C with Estimated Average Glucose Result: 5.7 % (09-08-23 @ 06:39)    Estimated Average Glucose: 117 mg/dL (09-08-23 @ 06:39)        Diet, Regular:   Consistent Carbohydrate Evening Snack (CSTCHOSN)  Isidro(7 Gm Arginine/7 Gm Glut/1.2 Gm HMB     Qty per Day:  2  Supplement Feeding Modality:  Oral  Glucerna Shake Cans or Servings Per Day:  3       Frequency:  Daily (09-15-23 @ 19:02) [Active]

## 2023-09-19 LAB
-  AMIKACIN: SIGNIFICANT CHANGE UP
-  AMOXICILLIN/CLAVULANIC ACID: SIGNIFICANT CHANGE UP
-  AMPICILLIN/SULBACTAM: SIGNIFICANT CHANGE UP
-  AMPICILLIN: SIGNIFICANT CHANGE UP
-  AZTREONAM: SIGNIFICANT CHANGE UP
-  CEFAZOLIN: SIGNIFICANT CHANGE UP
-  CEFEPIME: SIGNIFICANT CHANGE UP
-  CEFTRIAXONE: SIGNIFICANT CHANGE UP
-  CIPROFLOXACIN: SIGNIFICANT CHANGE UP
-  ERTAPENEM: SIGNIFICANT CHANGE UP
-  GENTAMICIN: SIGNIFICANT CHANGE UP
-  LEVOFLOXACIN: SIGNIFICANT CHANGE UP
-  MEROPENEM: SIGNIFICANT CHANGE UP
-  PIPERACILLIN/TAZOBACTAM: SIGNIFICANT CHANGE UP
-  TOBRAMYCIN: SIGNIFICANT CHANGE UP
-  TRIMETHOPRIM/SULFAMETHOXAZOLE: SIGNIFICANT CHANGE UP
ANION GAP SERPL CALC-SCNC: 13 MMOL/L — SIGNIFICANT CHANGE UP (ref 5–17)
APTT BLD: 34.2 SEC — SIGNIFICANT CHANGE UP (ref 24.5–35.6)
BUN SERPL-MCNC: 20 MG/DL — SIGNIFICANT CHANGE UP (ref 7–23)
CALCIUM SERPL-MCNC: 9.3 MG/DL — SIGNIFICANT CHANGE UP (ref 8.4–10.5)
CHLORIDE SERPL-SCNC: 103 MMOL/L — SIGNIFICANT CHANGE UP (ref 96–108)
CO2 SERPL-SCNC: 24 MMOL/L — SIGNIFICANT CHANGE UP (ref 22–31)
CREAT SERPL-MCNC: 0.59 MG/DL — SIGNIFICANT CHANGE UP (ref 0.5–1.3)
CULTURE RESULTS: SIGNIFICANT CHANGE UP
EGFR: 95 ML/MIN/1.73M2 — SIGNIFICANT CHANGE UP
GLUCOSE BLDC GLUCOMTR-MCNC: 166 MG/DL — HIGH (ref 70–99)
GLUCOSE BLDC GLUCOMTR-MCNC: 209 MG/DL — HIGH (ref 70–99)
GLUCOSE BLDC GLUCOMTR-MCNC: 281 MG/DL — HIGH (ref 70–99)
GLUCOSE BLDC GLUCOMTR-MCNC: 292 MG/DL — HIGH (ref 70–99)
GLUCOSE BLDC GLUCOMTR-MCNC: 305 MG/DL — HIGH (ref 70–99)
GLUCOSE SERPL-MCNC: 329 MG/DL — HIGH (ref 70–99)
HCT VFR BLD CALC: 34.6 % — SIGNIFICANT CHANGE UP (ref 34.5–45)
HGB BLD-MCNC: 9.9 G/DL — LOW (ref 11.5–15.5)
INR BLD: 2.44 RATIO — HIGH (ref 0.85–1.18)
MCHC RBC-ENTMCNC: 22.8 PG — LOW (ref 27–34)
MCHC RBC-ENTMCNC: 28.6 GM/DL — LOW (ref 32–36)
MCV RBC AUTO: 79.7 FL — LOW (ref 80–100)
METHOD TYPE: SIGNIFICANT CHANGE UP
NRBC # BLD: 0 /100 WBCS — SIGNIFICANT CHANGE UP (ref 0–0)
NT-PROBNP SERPL-SCNC: 120 PG/ML — SIGNIFICANT CHANGE UP (ref 0–300)
ORGANISM # SPEC MICROSCOPIC CNT: SIGNIFICANT CHANGE UP
ORGANISM # SPEC MICROSCOPIC CNT: SIGNIFICANT CHANGE UP
PLATELET # BLD AUTO: 398 K/UL — SIGNIFICANT CHANGE UP (ref 150–400)
POTASSIUM SERPL-MCNC: 4.6 MMOL/L — SIGNIFICANT CHANGE UP (ref 3.5–5.3)
POTASSIUM SERPL-SCNC: 4.6 MMOL/L — SIGNIFICANT CHANGE UP (ref 3.5–5.3)
PROTHROM AB SERPL-ACNC: 26.2 SEC — HIGH (ref 9.5–13)
RBC # BLD: 4.34 M/UL — SIGNIFICANT CHANGE UP (ref 3.8–5.2)
RBC # FLD: 25.3 % — HIGH (ref 10.3–14.5)
SODIUM SERPL-SCNC: 140 MMOL/L — SIGNIFICANT CHANGE UP (ref 135–145)
SPECIMEN SOURCE: SIGNIFICANT CHANGE UP
WBC # BLD: 15.07 K/UL — HIGH (ref 3.8–10.5)
WBC # FLD AUTO: 15.07 K/UL — HIGH (ref 3.8–10.5)

## 2023-09-19 PROCEDURE — 99232 SBSQ HOSP IP/OBS MODERATE 35: CPT

## 2023-09-19 RX ORDER — INSULIN LISPRO 100/ML
16 VIAL (ML) SUBCUTANEOUS
Refills: 0 | Status: DISCONTINUED | OUTPATIENT
Start: 2023-09-19 | End: 2023-09-25

## 2023-09-19 RX ORDER — MEROPENEM 1 G/30ML
1000 INJECTION INTRAVENOUS ONCE
Refills: 0 | Status: DISCONTINUED | OUTPATIENT
Start: 2023-09-19 | End: 2023-09-19

## 2023-09-19 RX ORDER — INSULIN LISPRO 100/ML
20 VIAL (ML) SUBCUTANEOUS
Refills: 0 | Status: DISCONTINUED | OUTPATIENT
Start: 2023-09-20 | End: 2023-09-22

## 2023-09-19 RX ORDER — DIPHENHYDRAMINE HCL 50 MG
50 CAPSULE ORAL ONCE
Refills: 0 | Status: COMPLETED | OUTPATIENT
Start: 2023-09-19 | End: 2023-09-19

## 2023-09-19 RX ORDER — ERTAPENEM SODIUM 1 G/1
1000 INJECTION, POWDER, LYOPHILIZED, FOR SOLUTION INTRAMUSCULAR; INTRAVENOUS ONCE
Refills: 0 | Status: COMPLETED | OUTPATIENT
Start: 2023-09-19 | End: 2023-09-19

## 2023-09-19 RX ORDER — DIPHENHYDRAMINE HCL 50 MG
25 CAPSULE ORAL ONCE
Refills: 0 | Status: DISCONTINUED | OUTPATIENT
Start: 2023-09-19 | End: 2023-09-19

## 2023-09-19 RX ORDER — INSULIN LISPRO 100/ML
4 VIAL (ML) SUBCUTANEOUS AT BEDTIME
Refills: 0 | Status: DISCONTINUED | OUTPATIENT
Start: 2023-09-19 | End: 2023-10-01

## 2023-09-19 RX ORDER — INSULIN LISPRO 100/ML
18 VIAL (ML) SUBCUTANEOUS
Refills: 0 | Status: DISCONTINUED | OUTPATIENT
Start: 2023-09-20 | End: 2023-09-23

## 2023-09-19 RX ORDER — INSULIN GLARGINE 100 [IU]/ML
32 INJECTION, SOLUTION SUBCUTANEOUS AT BEDTIME
Refills: 0 | Status: DISCONTINUED | OUTPATIENT
Start: 2023-09-19 | End: 2023-09-20

## 2023-09-19 RX ORDER — WARFARIN SODIUM 2.5 MG/1
6 TABLET ORAL ONCE
Refills: 0 | Status: COMPLETED | OUTPATIENT
Start: 2023-09-19 | End: 2023-09-19

## 2023-09-19 RX ADMIN — Medication 25 MILLIGRAM(S): at 13:50

## 2023-09-19 RX ADMIN — Medication 600 MILLIGRAM(S): at 18:38

## 2023-09-19 RX ADMIN — MUPIROCIN 1 APPLICATION(S): 20 OINTMENT TOPICAL at 18:39

## 2023-09-19 RX ADMIN — Medication 2 MILLILITER(S): at 13:50

## 2023-09-19 RX ADMIN — Medication 4: at 02:10

## 2023-09-19 RX ADMIN — MEXILETINE HYDROCHLORIDE 200 MILLIGRAM(S): 150 CAPSULE ORAL at 22:03

## 2023-09-19 RX ADMIN — Medication 4: at 13:52

## 2023-09-19 RX ADMIN — Medication 50 MILLIGRAM(S): at 23:43

## 2023-09-19 RX ADMIN — Medication 2 MILLILITER(S): at 05:46

## 2023-09-19 RX ADMIN — CHLORHEXIDINE GLUCONATE 1 APPLICATION(S): 213 SOLUTION TOPICAL at 05:41

## 2023-09-19 RX ADMIN — Medication 3 MILLILITER(S): at 13:51

## 2023-09-19 RX ADMIN — BUDESONIDE AND FORMOTEROL FUMARATE DIHYDRATE 2 PUFF(S): 160; 4.5 AEROSOL RESPIRATORY (INHALATION) at 05:46

## 2023-09-19 RX ADMIN — Medication 500 MILLIGRAM(S): at 13:50

## 2023-09-19 RX ADMIN — Medication 3 MILLILITER(S): at 00:02

## 2023-09-19 RX ADMIN — VALACYCLOVIR 1000 MILLIGRAM(S): 500 TABLET, FILM COATED ORAL at 22:03

## 2023-09-19 RX ADMIN — MEXILETINE HYDROCHLORIDE 200 MILLIGRAM(S): 150 CAPSULE ORAL at 05:48

## 2023-09-19 RX ADMIN — Medication 1 APPLICATION(S): at 18:39

## 2023-09-19 RX ADMIN — ATORVASTATIN CALCIUM 20 MILLIGRAM(S): 80 TABLET, FILM COATED ORAL at 22:03

## 2023-09-19 RX ADMIN — Medication 20 MILLIGRAM(S): at 05:47

## 2023-09-19 RX ADMIN — Medication 6: at 08:30

## 2023-09-19 RX ADMIN — MUPIROCIN 1 APPLICATION(S): 20 OINTMENT TOPICAL at 05:49

## 2023-09-19 RX ADMIN — Medication 6: at 18:38

## 2023-09-19 RX ADMIN — FAMOTIDINE 20 MILLIGRAM(S): 10 INJECTION INTRAVENOUS at 18:39

## 2023-09-19 RX ADMIN — SENNA PLUS 2 TABLET(S): 8.6 TABLET ORAL at 22:04

## 2023-09-19 RX ADMIN — AZITHROMYCIN 255 MILLIGRAM(S): 500 TABLET, FILM COATED ORAL at 13:51

## 2023-09-19 RX ADMIN — GABAPENTIN 100 MILLIGRAM(S): 400 CAPSULE ORAL at 22:03

## 2023-09-19 RX ADMIN — Medication 16 UNIT(S): at 18:38

## 2023-09-19 RX ADMIN — Medication 3 MILLILITER(S): at 18:38

## 2023-09-19 RX ADMIN — Medication 16 UNIT(S): at 13:52

## 2023-09-19 RX ADMIN — VALACYCLOVIR 1000 MILLIGRAM(S): 500 TABLET, FILM COATED ORAL at 13:50

## 2023-09-19 RX ADMIN — Medication 20 MILLIGRAM(S): at 22:02

## 2023-09-19 RX ADMIN — MEXILETINE HYDROCHLORIDE 200 MILLIGRAM(S): 150 CAPSULE ORAL at 13:50

## 2023-09-19 RX ADMIN — Medication 1 APPLICATION(S): at 05:49

## 2023-09-19 RX ADMIN — WARFARIN SODIUM 6 MILLIGRAM(S): 2.5 TABLET ORAL at 22:02

## 2023-09-19 RX ADMIN — Medication 3 MILLILITER(S): at 23:44

## 2023-09-19 RX ADMIN — TIOTROPIUM BROMIDE 2 PUFF(S): 18 CAPSULE ORAL; RESPIRATORY (INHALATION) at 13:51

## 2023-09-19 RX ADMIN — POLYETHYLENE GLYCOL 3350 17 GRAM(S): 17 POWDER, FOR SOLUTION ORAL at 05:49

## 2023-09-19 RX ADMIN — Medication 20 MILLIGRAM(S): at 13:51

## 2023-09-19 RX ADMIN — GABAPENTIN 100 MILLIGRAM(S): 400 CAPSULE ORAL at 13:50

## 2023-09-19 RX ADMIN — FAMOTIDINE 20 MILLIGRAM(S): 10 INJECTION INTRAVENOUS at 05:51

## 2023-09-19 RX ADMIN — BUDESONIDE AND FORMOTEROL FUMARATE DIHYDRATE 2 PUFF(S): 160; 4.5 AEROSOL RESPIRATORY (INHALATION) at 19:04

## 2023-09-19 RX ADMIN — Medication 3 MILLILITER(S): at 05:46

## 2023-09-19 RX ADMIN — Medication 2 MILLILITER(S): at 22:11

## 2023-09-19 RX ADMIN — INSULIN GLARGINE 32 UNIT(S): 100 INJECTION, SOLUTION SUBCUTANEOUS at 22:03

## 2023-09-19 RX ADMIN — VALACYCLOVIR 1000 MILLIGRAM(S): 500 TABLET, FILM COATED ORAL at 05:48

## 2023-09-19 RX ADMIN — CLOPIDOGREL BISULFATE 75 MILLIGRAM(S): 75 TABLET, FILM COATED ORAL at 13:50

## 2023-09-19 RX ADMIN — Medication 1 TABLET(S): at 14:00

## 2023-09-19 RX ADMIN — Medication 600 MILLIGRAM(S): at 05:48

## 2023-09-19 RX ADMIN — Medication 16 UNIT(S): at 08:30

## 2023-09-19 RX ADMIN — Medication 20 MILLIGRAM(S): at 23:43

## 2023-09-19 RX ADMIN — GABAPENTIN 100 MILLIGRAM(S): 400 CAPSULE ORAL at 05:48

## 2023-09-19 NOTE — PROGRESS NOTE ADULT - SUBJECTIVE AND OBJECTIVE BOX
DATE OF SERVICE: 09-19-23 @ 11:37    Patient is a 72y old  Female who presents with a chief complaint of sob (18 Sep 2023 19:27)      SUBJECTIVE / OVERNIGHT EVENTS:  feeling much better    MEDICATIONS  (STANDING):  acetylcysteine 10%  Inhalation 2 milliLiter(s) Inhalation every 8 hours  albuterol/ipratropium for Nebulization 3 milliLiter(s) Nebulizer every 6 hours  ascorbic acid 500 milliGRAM(s) Oral daily  atorvastatin 20 milliGRAM(s) Oral at bedtime  azithromycin  IVPB      azithromycin  IVPB 500 milliGRAM(s) IV Intermittent every 24 hours  budesonide  80 MICROgram(s)/formoterol 4.5 MICROgram(s) Inhaler 2 Puff(s) Inhalation two times a day  chlorhexidine 2% Cloths 1 Application(s) Topical <User Schedule>  clopidogrel Tablet 75 milliGRAM(s) Oral daily  famotidine    Tablet 20 milliGRAM(s) Oral two times a day  gabapentin 100 milliGRAM(s) Oral every 8 hours  guaiFENesin  milliGRAM(s) Oral every 12 hours  hydrocortisone 1% Cream 1 Application(s) Topical two times a day  insulin glargine Injectable (LANTUS) 30 Unit(s) SubCutaneous at bedtime  insulin lispro (ADMELOG) corrective regimen sliding scale   SubCutaneous three times a day before meals  insulin lispro (ADMELOG) corrective regimen sliding scale   SubCutaneous <User Schedule>  insulin lispro Injectable (ADMELOG) 16 Unit(s) SubCutaneous before breakfast  insulin lispro Injectable (ADMELOG) 16 Unit(s) SubCutaneous before dinner  insulin lispro Injectable (ADMELOG) 16 Unit(s) SubCutaneous before lunch  lidocaine   4% Patch 2 Patch Transdermal daily  methylPREDNISolone sodium succinate Injectable 20 milliGRAM(s) IV Push every 8 hours  mexiletine 200 milliGRAM(s) Oral every 8 hours  multivitamin 1 Tablet(s) Oral daily  mupirocin 2% Nasal 1 Application(s) Both Nostrils two times a day  polyethylene glycol 3350 17 Gram(s) Oral two times a day  polyethylene glycol 3350 17 Gram(s) Oral daily  senna 2 Tablet(s) Oral at bedtime  tiotropium 2.5 MICROgram(s) Inhaler 2 Puff(s) Inhalation daily  valACYclovir 1000 milliGRAM(s) Oral three times a day    MEDICATIONS  (PRN):  acetaminophen     Tablet .. 650 milliGRAM(s) Oral every 6 hours PRN Temp greater or equal to 38C (100.4F), Mild Pain (1 - 3)  diphenhydrAMINE 25 milliGRAM(s) Oral every 4 hours PRN Rash and/or Itching  simethicone 80 milliGRAM(s) Chew every 12 hours PRN Gas  traMADol 50 milliGRAM(s) Oral every 8 hours PRN Moderate Pain (4 - 6)      Vital Signs Last 24 Hrs  T(C): 36.6 (19 Sep 2023 11:02), Max: 36.7 (18 Sep 2023 21:49)  T(F): 97.9 (19 Sep 2023 11:02), Max: 98 (18 Sep 2023 21:49)  HR: 79 (19 Sep 2023 11:02) (76 - 80)  BP: 135/73 (19 Sep 2023 11:02) (118/61 - 135/73)  BP(mean): --  RR: 18 (19 Sep 2023 11:02) (17 - 18)  SpO2: 97% (19 Sep 2023 11:02) (97% - 97%)    Parameters below as of 19 Sep 2023 11:02  Patient On (Oxygen Delivery Method): room air      CAPILLARY BLOOD GLUCOSE      POCT Blood Glucose.: 281 mg/dL (19 Sep 2023 07:37)  POCT Blood Glucose.: 305 mg/dL (19 Sep 2023 01:59)  POCT Blood Glucose.: 172 mg/dL (18 Sep 2023 21:39)  POCT Blood Glucose.: 207 mg/dL (18 Sep 2023 18:09)  POCT Blood Glucose.: 283 mg/dL (18 Sep 2023 13:53)    I&O's Summary    18 Sep 2023 07:01  -  19 Sep 2023 07:00  --------------------------------------------------------  IN: 1340 mL / OUT: 0 mL / NET: 1340 mL        PHYSICAL EXAM:  GENERAL: NAD, well-developed  HEAD:  Atraumatic, Normocephalic  EYES: EOMI, PERRLA, conjunctiva and sclera clear  NECK: Supple, No JVD  CHEST/LUNG: Clear to auscultation bilaterally; No wheeze  HEART: Regular rate and rhythm; No murmurs, rubs, or gallops  ABDOMEN: Soft, Nontender, Nondistended; Bowel sounds present  EXTREMITIES:  2+ Peripheral Pulses, No clubbing, cyanosis, or edema  PSYCH: AAOx3  NEUROLOGY: non-focal  SKIN: No rashes or lesions    LABS:                        9.9    15.07 )-----------( 398      ( 19 Sep 2023 10:47 )             34.6     09-19    140  |  103  |  20  ----------------------------<  329<H>  4.6   |  24  |  0.59    Ca    9.3      19 Sep 2023 10:46      PT/INR - ( 18 Sep 2023 07:20 )   PT: 16.9 sec;   INR: 1.63 ratio               Urinalysis Basic - ( 19 Sep 2023 10:46 )    Color: x / Appearance: x / SG: x / pH: x  Gluc: 329 mg/dL / Ketone: x  / Bili: x / Urobili: x   Blood: x / Protein: x / Nitrite: x   Leuk Esterase: x / RBC: x / WBC x   Sq Epi: x / Non Sq Epi: x / Bacteria: x        RADIOLOGY & ADDITIONAL TESTS:    Imaging Personally Reviewed:    Consultant(s) Notes Reviewed:      Care Discussed with Consultants/Other Providers:

## 2023-09-19 NOTE — PROGRESS NOTE ADULT - SUBJECTIVE AND OBJECTIVE BOX
DIABETES FOLLOW UP NOTE: Saw pt earlier today    Chief Complaint: Endocrine consult requested for management of T2DM    INTERVAL HX: Pt stable, reports tolerating POs. BG levels above goal while on Methylprednisolone 20mg q8to improve breathing> Pt states she is breathing better today. No hypoglycemia.     Review of Systems:  General: As above  Cardiovascular: No chest pain, palpitations  Respiratory: SOB at exertion, no cough  GI: No nausea, vomiting, abdominal pain  Endocrine: No polyuria, polydipsia or S&Sx of hypoglycemia    Allergies    tetanus toxoid (Short breath)  Dilaudid (Short breath)  codeine (Short breath)  iodine (Short breath; Swelling)  Avelox (Short breath; Pruritus)  shellfish (Anaphylaxis)  cefepime (Anaphylaxis)  aspirin (Short breath)  Valium (Short breath)  penicillin (Anaphylaxis)    Intolerances      MEDICATIONS:  atorvastatin 20 milliGRAM(s) Oral at bedtime  azithromycin  IVPB 500 milliGRAM(s) IV Intermittent every 24 hours  insulin glargine Injectable (LANTUS) 30 Unit(s) SubCutaneous at bedtime  insulin lispro (ADMELOG) corrective regimen sliding scale   SubCutaneous three times a day before meals  insulin lispro (ADMELOG) corrective regimen sliding scale   SubCutaneous <User Schedule>  insulin lispro Injectable (ADMELOG) 16 Unit(s) SubCutaneous before lunch  insulin lispro Injectable (ADMELOG) 16 Unit(s) SubCutaneous before dinner  insulin lispro Injectable (ADMELOG) 16 Unit(s) SubCutaneous before breakfast  methylPREDNISolone sodium succinate Injectable 20 milliGRAM(s) IV Push every 8 hours  valACYclovir 1000 milliGRAM(s) Oral three times a day      PHYSICAL EXAM:  VITALS: T(C): 36.6 (09-19-23 @ 11:02)  T(F): 97.9 (09-19-23 @ 11:02), Max: 98 (09-18-23 @ 21:49)  HR: 85 (09-19-23 @ 14:39) (76 - 85)  BP: 187/80 (09-19-23 @ 14:39) (118/61 - 187/80)  RR:  (17 - 18)  SpO2:  (97% - 98%)  Wt(kg): --  GENERAL: Female laying in bed in NAD  Abdomen: Soft, nontender, non distended, central adiposity.   : Skin lesions in L groin and lower abdomen drying  Extremities: Warm, no edema in all 4 exts  NEURO: A&O X3    LABS:  POCT Blood Glucose.: 209 mg/dL (09-19-23 @ 13:37)  POCT Blood Glucose.: 281 mg/dL (09-19-23 @ 07:37)  POCT Blood Glucose.: 305 mg/dL (09-19-23 @ 01:59)  POCT Blood Glucose.: 172 mg/dL (09-18-23 @ 21:39)  POCT Blood Glucose.: 207 mg/dL (09-18-23 @ 18:09)  POCT Blood Glucose.: 283 mg/dL (09-18-23 @ 13:53)  POCT Blood Glucose.: 280 mg/dL (09-18-23 @ 08:13)  POCT Blood Glucose.: 338 mg/dL (09-18-23 @ 02:30)  POCT Blood Glucose.: 243 mg/dL (09-17-23 @ 22:25)  POCT Blood Glucose.: 316 mg/dL (09-17-23 @ 20:52)  POCT Blood Glucose.: 349 mg/dL (09-17-23 @ 16:56)  POCT Blood Glucose.: 143 mg/dL (09-17-23 @ 13:14)  POCT Blood Glucose.: 77 mg/dL (09-17-23 @ 12:08)  POCT Blood Glucose.: 57 mg/dL (09-17-23 @ 11:44)  POCT Blood Glucose.: 62 mg/dL (09-17-23 @ 11:43)  POCT Blood Glucose.: 191 mg/dL (09-17-23 @ 07:35)  POCT Blood Glucose.: 130 mg/dL (09-17-23 @ 02:13)  POCT Blood Glucose.: 201 mg/dL (09-16-23 @ 21:10)  POCT Blood Glucose.: 308 mg/dL (09-16-23 @ 17:25)                            9.9    15.07 )-----------( 398      ( 19 Sep 2023 10:47 )             34.6       09-19    140  |  103  |  20  ----------------------------<  329<H>  4.6   |  24  |  0.59    eGFR: 95    Ca    9.3      09-19      Thyroid Function Tests:  09-06 @ 09:32 TSH 0.49 FreeT4 1.2 T3 82 Anti TPO -- Anti Thyroglobulin Ab -- TSI --      A1C with Estimated Average Glucose Result: 5.7 % (09-08-23 @ 06:39)      Estimated Average Glucose: 117 mg/dL (09-08-23 @ 06:39)

## 2023-09-19 NOTE — PHYSICAL THERAPY INITIAL EVALUATION ADULT - PERTINENT HX OF CURRENT PROBLEM, REHAB EVAL
Pt is 72F admitted 9/12/23 with PMHF of asthma on chronic steroids, bronchiectasis, allergic rhinitis,  recurrent PNA,  tracheomalacia s/p tracheoplasty, ESBL proteus infections (sputum),  diabetes, paroxysmal A-fib on coumadin, colorectal cancer many years ago status post colostomy, recent hospitalization at an outside hospital  for acute respiratory failure with hypoxia secondary to asthma/COPD exacerbation and bronchopneumonia 6/5/2023 - 6/16/2023), and AVR 2022 with Dr. Cabrera.  Pt admits to chronic SOB and cough for years and is up to date on vaccines. Dr. Allison follows as an outpatient for her COPD. s/p 9/6 TAVR- [facility] with Dr. Gonsalves and Dr. Leahy. discharged home 9/10. Pt presents to office today for f/u visit  w/ c/o of worsening SOB. Pt to be admitted for further workup and eval. Pt stable at this time. VSS; O2 sats 97% RA.      XRAY ABDOMEN: Nonobstructive bowel gas pattern.Moderate stool burden.

## 2023-09-19 NOTE — PROGRESS NOTE ADULT - ASSESSMENT
72 y.o. F with PMH of asthma on chronic steroids, bronchiectasis, allergic rhinitis,  recurrent PNA,  tracheomalacia s/p tracheoplasty, ESBL proteus infections (sputum),  diabetes, paroxysmal A-fib on coumadin, colorectal cancer many years ago status post colostomy, recent hospitalization at an outside hospital  for acute respiratory failure with hypoxia secondary to asthma/COPD exacerbation and bronchopneumonia 6/5/2023 - 6/16/2023), and AVR 2022 with Dr. Cabrera.  Pt admits to chronic SOB and cough for years and is up to date on vaccines. Dr. Allison follows as an outpatient for her COPD. s/p 9/6 TAVR- [facility] with Dr. Gonsalves and Dr. Leahy. discharged home 9/10. Pt presents to office today for f/u visit  w/ c/o of worsening SOB. Pt to be admitted for further workup and eval. Pt stable at this time. VSS; O2 sats 97% RA.  ,H/o  ,bronchiectasis, tracheomalacia s/p tracheoplasty who presents after a recent admission for bronchiectasis exacerbation (6/2023, treated with Ertapenem for ESBL proteus), and again in 9/2023 for an acute flare whose hospital course was complicated by identification of severe AS requiring valve in valve TAVR 9/7/23    bronchiectasis  9/13 Pulm eval /rec Routine sputum culture, urine strep/legionella, RVP, COVID  3 sputum AFB   Considering culture history would start ertapenem and vancomycin for MRSA   Bronchodilator: Standing duonebs q 4 hours, nebulized pulmicort  -  iv steroids solumedrol 20 iv q 8  - vest therapy  - mucous for culture and sensitivity  - nebulized mucomyst  - Ensure she is getting twice per day airway clearance: Albuterol nebulizer followed by saline nebulizer followed by cough assist device and encouragement to cough and clear  - Pulmonary will continue to follow  - F/u  cultures, ambulation.      S/P TAVR (transcatheter aortic valve replacement).   ·  Plan: Structural Team Following   Continue with Plavix 75 mg PO daily   Continue with Mexiletine 200 mg every 8 hours   Continue with Atorvastatin 20 mg PO HS    Daily EKG   Increase activity as tolerated.   Encourage Chest PT / Pulmonary toileting and Incentive spirometry every 1 hour x 10 while awake.   Continue with PUD and DVT prophylaxis.   Daily Shower     shingles  - valcyte 1 gram tic for 7 days      Uncontrolled type 2 diabetes mellitus with hyperglycemia.   ·  Plan: Test BG ac and hs and 2am if eating. Q6H while NPO  -restart Lantus 32 units sq hs   - restart  Admelog 15-16-18 units qac   Hold if NPO or eating less than 50% of meals.  -  moderate correction scale ac meals and moderate correction hs and 2am.      Plan to d/c on basal bolus.   Outpt. endo follow-up. Dr Saavedra  Outpt. optho, podiatry, micro/cr  Consider Freesytle Luis E 3  upon discharge.    Essential hypertension.   ·  Plan: Goal BP <130/80   - cont current meds     Hyperlipidemia.   ·  Plan: LDL goal <70 due to DM  Pt LDL N/A  Order fasting lipid if not recently done  Statin as noted above     F/u levels as out pt.    H/O adrenal insufficiency.   ·  Plan: On chronic steroids at home > medrol 8mg qd from admission in 2022      Atrial fibrillation.   ·  Plan: Continue with Mexiletine 200 mg every 8 hours   Coumadin 6 mg PO tonight   Daily PT/ INR.

## 2023-09-19 NOTE — PROGRESS NOTE ADULT - ASSESSMENT
72 F w/h/o uncontrolled T2DM (A1C 9.4%) on basal/bolus insulin PTA. Unknown DM complications. Also COPD, secondary adrenal Insufficiency on chronic steroids, colorectal cancer s/p resection (colostomy bag), Chronic A fib on Eliquis, and tracheomalacia and multiple intubations, type A aortic dissection s/p aortic dissection repair on 9/07/22, sepsis. Pt well known to this provider from prior admissions. Pt recently admitted with SOB and found to have anemia and severe aortic stenosis>requiring valve in valve TAVR 9/7/23 discharged 9/10/23. Back with increasing productive cough, dyspnea and also found to have Herpes zoster on antiviral. Started Methylprednisolone 20 mg IV X3 with rebound hyperglycemia. Endocrine consult for management of hyperglycemia. Will adjust insulin doses while on high steroid doses but need to be careful to adjust insulin down once pt is back on chronic steroid dose. BG goal (100-180mg/dl).        Home regimen: Lantus 18 untis at HS and Humalog 15 units ACTID

## 2023-09-19 NOTE — PHYSICAL THERAPY INITIAL EVALUATION ADULT - PLANNED THERAPY INTERVENTIONS, PT EVAL
GOALS: Pt will negotiate 12 steps with handrail  & supervision in 4wks/bed mobility training/gait training/transfer training

## 2023-09-19 NOTE — PHYSICAL THERAPY INITIAL EVALUATION ADULT - IMPAIRMENTS CONTRIBUTING TO GAIT DEVIATIONS, PT EVAL
Duration Of Freeze Thaw-Cycle (Seconds): 5-10 Render Note In Bullet Format When Appropriate: No Consent: The patient's consent was obtained including but not limited to risks of crusting, scabbing, blistering, scarring, darker or lighter pigmentary change, recurrence, incomplete removal and infection. Post-Care Instructions: I reviewed with the patient in detail post-care instructions. Patient is to wear sunprotection, and avoid picking at any of the treated lesions. Pt may apply Vaseline to crusted or scabbing areas. Render Post-Care Instructions In Note?: yes Medical Necessity Information: It is in your best interest to select a reason for this procedure from the list below. All of these items fulfill various CMS LCD requirements except the new and changing color options. Detail Level: Detailed Medical Necessity Clause: This procedure was medically necessary because the lesions that were treated were: Number Of Freeze-Thaw Cycles: 1 freeze-thaw cycle impaired balance/decreased strength

## 2023-09-19 NOTE — CHART NOTE - NSCHARTNOTEFT_GEN_A_CORE
F/u on Sputum Cx  Final Cx: Proteus Mirabilis ESBL  Discussed with Attending Dr. Hawk; will dose Meropenem 1g IV x1  Sensitivities noted; Erthapenem <0.5, Meropenem < 1; pt with documented allergic reaction  to cefepime (Anaphylaxis); chart reviewed and EMAR reviewed, pt tolerate previous course of Meropenem absent complication/reaction; as early as 05-06/2023.  Meropenem 1g IV x1  F/u with ID and Pulmonary 9/20/2023 for further dosing  Endorsed to RN  Will endorse to AM team F/u on Sputum Cx  Final Cx: Proteus Mirabilis ESBL  Discussed with Attending Dr. Hawk; will dose Meropenem 1g IV x1  Sensitivities noted; Erthapenem <0.5, Meropenem < 1; pt with documented allergic reaction  to cefepime (Anaphylaxis); chart reviewed and EMAR reviewed, pt didn't tolerate previous course of Meropenem with complication/reaction; with developed rash as early as 05-06/2023.  Will dose Ertapenem 1g IV x1 with 25mg IV Benadryl x1 and Solumedrol 20mg IV x1 to make total of 40mg (pt already on 20 q8hrs; last received 10:02pm)  Monitor patient clinically and physically for signs/symptoms of allergic reaction)  F/u with ID and Pulmonary 9/20/2023 for further Ertapenem dosing and need for pre-medication.   Endorsed to RN  Will endorse to AM team F/u on Sputum Cx  Final Cx: Proteus Mirabilis ESBL  Discussed with Attending Dr. Hawk; will dose Meropenem 1g IV x1  Sensitivities noted; Erthapenem <0.5, Meropenem < 1; pt with documented allergic reaction  to cefepime (Anaphylaxis); chart reviewed and EMAR reviewed, pt didn't tolerate previous course of Meropenem with complication/reaction; with developed rash as early as 05-06/2023.  Will dose Ertapenem 1g IV x1 with 50mg IV Benadryl x1 and Solumedrol 20mg IV x1 to make total of 40mg (pt already on 20 q8hrs; last received 10:02pm)  Monitor patient clinically and physically for signs/symptoms of allergic reaction)  F/u with ID and Pulmonary 9/20/2023 for further Ertapenem dosing and need for pre-medication.   Endorsed to RN  Will endorse to AM team F/u on Sputum Cx  Final Cx: Proteus Mirabilis ESBL  Discussed with Attending Dr. Hawk; will dose Ertapenem 1g IV x1  Sensitivities noted; Erthapenem <0.5, Meropenem < 1; pt with documented allergic reaction  to cefepime (Anaphylaxis); chart reviewed and EMAR reviewed, pt didn't tolerate previous course of Meropenem with complication/reaction; with developed rash as early as 05-06/2023.  Will dose Ertapenem 1g IV x1 with 50mg IV Benadryl x1 and Solumedrol 20mg IV x1 to make total of 40mg (pt already on 20 q8hrs; last received 10:02pm)  Monitor patient clinically and physically for signs/symptoms of allergic reaction)  F/u with ID and Pulmonary 9/20/2023 for further Ertapenem dosing and need for pre-medication.   Endorsed to RN  Will endorse to AM team

## 2023-09-19 NOTE — PHYSICAL THERAPY INITIAL EVALUATION ADULT - GAIT DEVIATIONS NOTED, PT EVAL
decreased bernie/increased time in double stance/decreased velocity of limb motion/decreased step length/decreased stride length/decreased weight-shifting ability

## 2023-09-20 LAB
ANION GAP SERPL CALC-SCNC: 13 MMOL/L — SIGNIFICANT CHANGE UP (ref 5–17)
APTT BLD: 33.5 SEC — SIGNIFICANT CHANGE UP (ref 24.5–35.6)
BUN SERPL-MCNC: 26 MG/DL — HIGH (ref 7–23)
CALCIUM SERPL-MCNC: 9.1 MG/DL — SIGNIFICANT CHANGE UP (ref 8.4–10.5)
CHLORIDE SERPL-SCNC: 103 MMOL/L — SIGNIFICANT CHANGE UP (ref 96–108)
CO2 SERPL-SCNC: 24 MMOL/L — SIGNIFICANT CHANGE UP (ref 22–31)
CREAT SERPL-MCNC: 0.57 MG/DL — SIGNIFICANT CHANGE UP (ref 0.5–1.3)
EGFR: 95 ML/MIN/1.73M2 — SIGNIFICANT CHANGE UP
GLUCOSE BLDC GLUCOMTR-MCNC: 114 MG/DL — HIGH (ref 70–99)
GLUCOSE BLDC GLUCOMTR-MCNC: 126 MG/DL — HIGH (ref 70–99)
GLUCOSE BLDC GLUCOMTR-MCNC: 177 MG/DL — HIGH (ref 70–99)
GLUCOSE BLDC GLUCOMTR-MCNC: 263 MG/DL — HIGH (ref 70–99)
GLUCOSE BLDC GLUCOMTR-MCNC: 95 MG/DL — SIGNIFICANT CHANGE UP (ref 70–99)
GLUCOSE SERPL-MCNC: 262 MG/DL — HIGH (ref 70–99)
HCT VFR BLD CALC: 32.2 % — LOW (ref 34.5–45)
HGB BLD-MCNC: 9.5 G/DL — LOW (ref 11.5–15.5)
INR BLD: 2.89 RATIO — HIGH (ref 0.85–1.18)
MCHC RBC-ENTMCNC: 23.1 PG — LOW (ref 27–34)
MCHC RBC-ENTMCNC: 29.5 GM/DL — LOW (ref 32–36)
MCV RBC AUTO: 78.3 FL — LOW (ref 80–100)
NRBC # BLD: 1 /100 WBCS — HIGH (ref 0–0)
PLATELET # BLD AUTO: 343 K/UL — SIGNIFICANT CHANGE UP (ref 150–400)
POTASSIUM SERPL-MCNC: 4.4 MMOL/L — SIGNIFICANT CHANGE UP (ref 3.5–5.3)
POTASSIUM SERPL-SCNC: 4.4 MMOL/L — SIGNIFICANT CHANGE UP (ref 3.5–5.3)
PROTHROM AB SERPL-ACNC: 30.8 SEC — HIGH (ref 9.5–13)
RBC # BLD: 4.11 M/UL — SIGNIFICANT CHANGE UP (ref 3.8–5.2)
RBC # FLD: 25.5 % — HIGH (ref 10.3–14.5)
SODIUM SERPL-SCNC: 140 MMOL/L — SIGNIFICANT CHANGE UP (ref 135–145)
WBC # BLD: 16.68 K/UL — HIGH (ref 3.8–10.5)
WBC # FLD AUTO: 16.68 K/UL — HIGH (ref 3.8–10.5)

## 2023-09-20 PROCEDURE — 71045 X-RAY EXAM CHEST 1 VIEW: CPT | Mod: 26

## 2023-09-20 PROCEDURE — 99232 SBSQ HOSP IP/OBS MODERATE 35: CPT

## 2023-09-20 RX ORDER — PANTOPRAZOLE SODIUM 20 MG/1
40 TABLET, DELAYED RELEASE ORAL DAILY
Refills: 0 | Status: DISCONTINUED | OUTPATIENT
Start: 2023-09-20 | End: 2023-10-01

## 2023-09-20 RX ORDER — DIPHENHYDRAMINE HCL 50 MG
50 CAPSULE ORAL ONCE
Refills: 0 | Status: COMPLETED | OUTPATIENT
Start: 2023-09-20 | End: 2023-09-20

## 2023-09-20 RX ORDER — WARFARIN SODIUM 2.5 MG/1
2.5 TABLET ORAL ONCE
Refills: 0 | Status: COMPLETED | OUTPATIENT
Start: 2023-09-20 | End: 2023-09-20

## 2023-09-20 RX ORDER — ERTAPENEM SODIUM 1 G/1
1000 INJECTION, POWDER, LYOPHILIZED, FOR SOLUTION INTRAMUSCULAR; INTRAVENOUS ONCE
Refills: 0 | Status: COMPLETED | OUTPATIENT
Start: 2023-09-20 | End: 2023-09-20

## 2023-09-20 RX ORDER — INSULIN GLARGINE 100 [IU]/ML
28 INJECTION, SOLUTION SUBCUTANEOUS AT BEDTIME
Refills: 0 | Status: DISCONTINUED | OUTPATIENT
Start: 2023-09-20 | End: 2023-09-21

## 2023-09-20 RX ADMIN — Medication 20 MILLIGRAM(S): at 21:28

## 2023-09-20 RX ADMIN — MUPIROCIN 1 APPLICATION(S): 20 OINTMENT TOPICAL at 05:27

## 2023-09-20 RX ADMIN — BUDESONIDE AND FORMOTEROL FUMARATE DIHYDRATE 2 PUFF(S): 160; 4.5 AEROSOL RESPIRATORY (INHALATION) at 05:25

## 2023-09-20 RX ADMIN — ATORVASTATIN CALCIUM 20 MILLIGRAM(S): 80 TABLET, FILM COATED ORAL at 21:29

## 2023-09-20 RX ADMIN — GABAPENTIN 100 MILLIGRAM(S): 400 CAPSULE ORAL at 05:25

## 2023-09-20 RX ADMIN — GABAPENTIN 100 MILLIGRAM(S): 400 CAPSULE ORAL at 13:59

## 2023-09-20 RX ADMIN — Medication 600 MILLIGRAM(S): at 05:25

## 2023-09-20 RX ADMIN — Medication 3 MILLILITER(S): at 23:53

## 2023-09-20 RX ADMIN — Medication 16 UNIT(S): at 18:07

## 2023-09-20 RX ADMIN — POLYETHYLENE GLYCOL 3350 17 GRAM(S): 17 POWDER, FOR SOLUTION ORAL at 12:22

## 2023-09-20 RX ADMIN — Medication 1 TABLET(S): at 12:22

## 2023-09-20 RX ADMIN — WARFARIN SODIUM 2.5 MILLIGRAM(S): 2.5 TABLET ORAL at 21:29

## 2023-09-20 RX ADMIN — MEXILETINE HYDROCHLORIDE 200 MILLIGRAM(S): 150 CAPSULE ORAL at 13:58

## 2023-09-20 RX ADMIN — AZITHROMYCIN 255 MILLIGRAM(S): 500 TABLET, FILM COATED ORAL at 12:20

## 2023-09-20 RX ADMIN — TIOTROPIUM BROMIDE 2 PUFF(S): 18 CAPSULE ORAL; RESPIRATORY (INHALATION) at 12:23

## 2023-09-20 RX ADMIN — CLOPIDOGREL BISULFATE 75 MILLIGRAM(S): 75 TABLET, FILM COATED ORAL at 12:20

## 2023-09-20 RX ADMIN — GABAPENTIN 100 MILLIGRAM(S): 400 CAPSULE ORAL at 21:34

## 2023-09-20 RX ADMIN — Medication 50 MILLIGRAM(S): at 20:34

## 2023-09-20 RX ADMIN — FAMOTIDINE 20 MILLIGRAM(S): 10 INJECTION INTRAVENOUS at 05:25

## 2023-09-20 RX ADMIN — Medication 20 UNIT(S): at 07:50

## 2023-09-20 RX ADMIN — MEXILETINE HYDROCHLORIDE 200 MILLIGRAM(S): 150 CAPSULE ORAL at 05:24

## 2023-09-20 RX ADMIN — ERTAPENEM SODIUM 120 MILLIGRAM(S): 1 INJECTION, POWDER, LYOPHILIZED, FOR SOLUTION INTRAMUSCULAR; INTRAVENOUS at 00:15

## 2023-09-20 RX ADMIN — VALACYCLOVIR 1000 MILLIGRAM(S): 500 TABLET, FILM COATED ORAL at 05:25

## 2023-09-20 RX ADMIN — Medication 2 MILLILITER(S): at 21:28

## 2023-09-20 RX ADMIN — MEXILETINE HYDROCHLORIDE 200 MILLIGRAM(S): 150 CAPSULE ORAL at 22:53

## 2023-09-20 RX ADMIN — VALACYCLOVIR 1000 MILLIGRAM(S): 500 TABLET, FILM COATED ORAL at 21:29

## 2023-09-20 RX ADMIN — ERTAPENEM SODIUM 120 MILLIGRAM(S): 1 INJECTION, POWDER, LYOPHILIZED, FOR SOLUTION INTRAMUSCULAR; INTRAVENOUS at 21:29

## 2023-09-20 RX ADMIN — BUDESONIDE AND FORMOTEROL FUMARATE DIHYDRATE 2 PUFF(S): 160; 4.5 AEROSOL RESPIRATORY (INHALATION) at 18:02

## 2023-09-20 RX ADMIN — Medication 20 MILLIGRAM(S): at 13:59

## 2023-09-20 RX ADMIN — INSULIN GLARGINE 28 UNIT(S): 100 INJECTION, SOLUTION SUBCUTANEOUS at 21:29

## 2023-09-20 RX ADMIN — Medication 1 APPLICATION(S): at 05:26

## 2023-09-20 RX ADMIN — VALACYCLOVIR 1000 MILLIGRAM(S): 500 TABLET, FILM COATED ORAL at 13:58

## 2023-09-20 RX ADMIN — CHLORHEXIDINE GLUCONATE 1 APPLICATION(S): 213 SOLUTION TOPICAL at 05:26

## 2023-09-20 RX ADMIN — Medication 20 MILLIGRAM(S): at 05:25

## 2023-09-20 RX ADMIN — Medication 600 MILLIGRAM(S): at 18:02

## 2023-09-20 RX ADMIN — Medication 6: at 07:54

## 2023-09-20 RX ADMIN — Medication 18 UNIT(S): at 12:21

## 2023-09-20 RX ADMIN — Medication 2 MILLILITER(S): at 13:57

## 2023-09-20 RX ADMIN — Medication 500 MILLIGRAM(S): at 12:20

## 2023-09-20 RX ADMIN — SENNA PLUS 2 TABLET(S): 8.6 TABLET ORAL at 21:30

## 2023-09-20 RX ADMIN — Medication 3 MILLILITER(S): at 12:23

## 2023-09-20 RX ADMIN — PANTOPRAZOLE SODIUM 40 MILLIGRAM(S): 20 TABLET, DELAYED RELEASE ORAL at 12:22

## 2023-09-20 NOTE — PROGRESS NOTE ADULT - SUBJECTIVE AND OBJECTIVE BOX
DATE OF SERVICE: 09-20-23 @ 13:06    Patient is a 72y old  Female who presents with a chief complaint of sob (19 Sep 2023 16:40)      SUBJECTIVE / OVERNIGHT EVENTS:    MEDICATIONS  (STANDING):  acetylcysteine 10%  Inhalation 2 milliLiter(s) Inhalation every 8 hours  albuterol/ipratropium for Nebulization 3 milliLiter(s) Nebulizer every 6 hours  ascorbic acid 500 milliGRAM(s) Oral daily  atorvastatin 20 milliGRAM(s) Oral at bedtime  azithromycin  IVPB      azithromycin  IVPB 500 milliGRAM(s) IV Intermittent every 24 hours  budesonide  80 MICROgram(s)/formoterol 4.5 MICROgram(s) Inhaler 2 Puff(s) Inhalation two times a day  chlorhexidine 2% Cloths 1 Application(s) Topical <User Schedule>  clopidogrel Tablet 75 milliGRAM(s) Oral daily  gabapentin 100 milliGRAM(s) Oral every 8 hours  guaiFENesin  milliGRAM(s) Oral every 12 hours  hydrocortisone 1% Cream 1 Application(s) Topical two times a day  insulin glargine Injectable (LANTUS) 28 Unit(s) SubCutaneous at bedtime  insulin lispro (ADMELOG) corrective regimen sliding scale   SubCutaneous <User Schedule>  insulin lispro (ADMELOG) corrective regimen sliding scale   SubCutaneous three times a day before meals  insulin lispro Injectable (ADMELOG) 20 Unit(s) SubCutaneous before breakfast  insulin lispro Injectable (ADMELOG) 18 Unit(s) SubCutaneous before lunch  insulin lispro Injectable (ADMELOG) 16 Unit(s) SubCutaneous before dinner  insulin lispro Injectable (ADMELOG) 4 Unit(s) SubCutaneous at bedtime  lidocaine   4% Patch 2 Patch Transdermal daily  methylPREDNISolone sodium succinate Injectable 20 milliGRAM(s) IV Push every 8 hours  mexiletine 200 milliGRAM(s) Oral every 8 hours  multivitamin 1 Tablet(s) Oral daily  mupirocin 2% Nasal 1 Application(s) Both Nostrils two times a day  pantoprazole    Tablet 40 milliGRAM(s) Oral daily  polyethylene glycol 3350 17 Gram(s) Oral daily  polyethylene glycol 3350 17 Gram(s) Oral two times a day  senna 2 Tablet(s) Oral at bedtime  tiotropium 2.5 MICROgram(s) Inhaler 2 Puff(s) Inhalation daily  valACYclovir 1000 milliGRAM(s) Oral three times a day    MEDICATIONS  (PRN):  acetaminophen     Tablet .. 650 milliGRAM(s) Oral every 6 hours PRN Temp greater or equal to 38C (100.4F), Mild Pain (1 - 3)  diphenhydrAMINE 25 milliGRAM(s) Oral every 4 hours PRN Rash and/or Itching  simethicone 80 milliGRAM(s) Chew every 12 hours PRN Gas  traMADol 50 milliGRAM(s) Oral every 8 hours PRN Moderate Pain (4 - 6)      Vital Signs Last 24 Hrs  T(C): 36.7 (20 Sep 2023 11:02), Max: 37.1 (19 Sep 2023 21:06)  T(F): 98 (20 Sep 2023 11:02), Max: 98.8 (19 Sep 2023 21:06)  HR: 78 (20 Sep 2023 11:02) (76 - 85)  BP: 143/81 (20 Sep 2023 11:02) (116/65 - 187/80)  BP(mean): --  RR: 17 (20 Sep 2023 04:00) (17 - 18)  SpO2: 98% (20 Sep 2023 11:02) (98% - 99%)    Parameters below as of 20 Sep 2023 11:02  Patient On (Oxygen Delivery Method): room air      CAPILLARY BLOOD GLUCOSE      POCT Blood Glucose.: 114 mg/dL (20 Sep 2023 11:53)  POCT Blood Glucose.: 263 mg/dL (20 Sep 2023 07:43)  POCT Blood Glucose.: 95 mg/dL (20 Sep 2023 02:10)  POCT Blood Glucose.: 166 mg/dL (19 Sep 2023 21:28)  POCT Blood Glucose.: 292 mg/dL (19 Sep 2023 18:36)  POCT Blood Glucose.: 209 mg/dL (19 Sep 2023 13:37)    I&O's Summary    19 Sep 2023 07:01  -  20 Sep 2023 07:00  --------------------------------------------------------  IN: 480 mL / OUT: 0 mL / NET: 480 mL    20 Sep 2023 07:01  -  20 Sep 2023 13:06  --------------------------------------------------------  IN: 480 mL / OUT: 0 mL / NET: 480 mL        PHYSICAL EXAM:  GENERAL: NAD, well-developed  HEAD:  Atraumatic, Normocephalic  EYES: EOMI, PERRLA, conjunctiva and sclera clear  NECK: Supple, No JVD  CHEST/LUNG: Clear to auscultation bilaterally; No wheeze  HEART: Regular rate and rhythm; No murmurs, rubs, or gallops  ABDOMEN: Soft, Nontender, Nondistended; Bowel sounds present  EXTREMITIES:  2+ Peripheral Pulses, No clubbing, cyanosis, or edema  PSYCH: AAOx3  NEUROLOGY: non-focal  SKIN: No rashes or lesions    LABS:                        9.5    16.68 )-----------( 343      ( 20 Sep 2023 05:59 )             32.2     09-20    140  |  103  |  26<H>  ----------------------------<  262<H>  4.4   |  24  |  0.57    Ca    9.1      20 Sep 2023 05:59      PT/INR - ( 20 Sep 2023 06:00 )   PT: 30.8 sec;   INR: 2.89 ratio         PTT - ( 20 Sep 2023 06:00 )  PTT:33.5 sec      Urinalysis Basic - ( 20 Sep 2023 05:59 )    Color: x / Appearance: x / SG: x / pH: x  Gluc: 262 mg/dL / Ketone: x  / Bili: x / Urobili: x   Blood: x / Protein: x / Nitrite: x   Leuk Esterase: x / RBC: x / WBC x   Sq Epi: x / Non Sq Epi: x / Bacteria: x    Culture - Sputum . (09.18.23 @ 00:30)   Gram Stain:   Few-moderate polymorphonuclear leukocytes per low power field   Few Squamous epithelial cells per low power field   Few-moderate Gram positive cocci in pairs seen per oil power field   Few Gram Positive Rods seen per oil power field   Few Gram NegativeRods seen per oil power field   Few Yeast like cells seen per oil power field  - Amikacin: S <=16  - Amoxicillin/Clavulanic Acid: S <=8/4  - Ampicillin: R >16 These ampicillin results predict results for amoxicillin  - Ampicillin/Sulbactam: R <=4/2  - Aztreonam: R >16  - Cefazolin: R >16  - Cefepime: R >16  - Ceftriaxone: R >32  - Ciprofloxacin: R >2  - Ertapenem: S <=0.5  - Gentamicin: R >8  - Levofloxacin: R >4  - Meropenem: S <=1  - Piperacillin/Tazobactam: R <=8  - Tobramycin: R >8  - Trimethoprim/Sulfamethoxazole: R >2/38  Specimen Source: .Sputum Sputum  Culture Results:   Numerous Proteus mirabilis ESBL   Normal Respiratory Jessica present  Organism Identification: Proteus mirabilis ESBL  Organism: Proteus mirabilis ESBL  Method Type: GARRETT    RADIOLOGY & ADDITIONAL TESTS:    Imaging Personally Reviewed:    Consultant(s) Notes Reviewed:      Care Discussed with Consultants/Other Providers:

## 2023-09-20 NOTE — PROGRESS NOTE ADULT - ASSESSMENT
72 year old on chronic steroids for for COPD, and adrenal insufficiency asthma, TIA, bronchiectasias, TIA, recurrent VZV. Recently had a TAVR and was readmitted for sob and copd   Being treated by pulmonary and yesterday note  to have a rash on her right upper leg  Classic VZV rash localized.  no signs of dissemination    was treated and improved    recently with mild sob and sputum with esbl proteus  sats are stable and no recent imaging  unclear if this is colonization or really an exacerbation from copd or just her baseline  developed rash with meropenem but tolerated invanz with steroids and antihistamine  no other good options to treat.    suggest    follow up chest XRAY  unless new infiltrate  short course of invanz with antihistamines alone for 4-5 days  if new infiltrate , will need 8 days

## 2023-09-20 NOTE — CHART NOTE - NSCHARTNOTEFT_GEN_A_CORE
Noted all BG levels improved while on present insulin doses. Noted BG overnight at 95> per pt report she ate after seeing number with FBG >200s. Will preventily decrease Lantus insulin and re eavlaute.   Pt remains' on  Methylprednisolone 20 mg IV X3.  BG goal (100-180mg/dl).     POCT Blood Glucose.: 126 mg/dL (09-20-23 @ 17:30)  POCT Blood Glucose.: 114 mg/dL (09-20-23 @ 11:53)  POCT Blood Glucose.: 263 mg/dL (09-20-23 @ 07:43)  POCT Blood Glucose.: 95 mg/dL (09-20-23 @ 02:10)  POCT Blood Glucose.: 166 mg/dL (09-19-23 @ 21:28)  POCT Blood Glucose.: 292 mg/dL (09-19-23 @ 18:36)  POCT Blood Glucose.: 209 mg/dL (09-19-23 @ 13:37)  POCT Blood Glucose.: 281 mg/dL (09-19-23 @ 07:37)  POCT Blood Glucose.: 305 mg/dL (09-19-23 @ 01:59)  POCT Blood Glucose.: 172 mg/dL (09-18-23 @ 21:39)    MEDICATIONS:  atorvastatin 20 milliGRAM(s) Oral at bedtime  azithromycin  IVPB 500 milliGRAM(s) IV Intermittent every 24 hours  ertapenem  IVPB 1000 milliGRAM(s) IV Intermittent once  insulin glargine Injectable (LANTUS) 28 Unit(s) SubCutaneous at bedtime  insulin lispro (ADMELOG) corrective regimen sliding scale   SubCutaneous <User Schedule>  insulin lispro (ADMELOG) corrective regimen sliding scale   SubCutaneous three times a day before meals  insulin lispro Injectable (ADMELOG) 18 Unit(s) SubCutaneous before lunch  insulin lispro Injectable (ADMELOG) 16 Unit(s) SubCutaneous before dinner  insulin lispro Injectable (ADMELOG) 4 Unit(s) SubCutaneous at bedtime  insulin lispro Injectable (ADMELOG) 20 Unit(s) SubCutaneous before breakfast  methylPREDNISolone sodium succinate Injectable 20 milliGRAM(s) IV Push every 8 hours  valACYclovir 1000 milliGRAM(s) Oral three times a day    PLAN:   - Test BGs ACTID, hs and 2am OR q 6 hours if NPO  -Changed lantus dose to 28 units at hs for now  -c/w admelog dose to 20-18-16 units TID pre meals HOLD if NPO/skip meals OR eat less than 50 % of meals  -Continue moderate ademlog correction scale ACTID and moderate correction scale at HS and 2am   -Please f/u Hypoglycemia protocol and document treatment  -Please notify endocrine if changes to methylprednisolone doses as insulin doses will also need to be changed accordingly   -Please add Admelog 4 units with bedtime snack for now. HOLD IF NOT EATING>   Discharge:  Will be determined according to BG/insulin needs/PO intake and steroid therapy at time of discharge.  Plan to d/c on basal bolus. Doses TBD  Outpt. endo follow-up. Dr Saavedra  Outpt. optho, podiatry, micro/cr  Make sure pt has Rx for all DM supplies and insulin/ DM meds. Consider [employer] Luis E 3 if going home. Need to place keshia on phone to make sure CGM is compatible. Pt aware> will have daughter do it.

## 2023-09-20 NOTE — PROGRESS NOTE ADULT - ASSESSMENT
72 y.o. F with PMH of asthma on chronic steroids, bronchiectasis, allergic rhinitis,  recurrent PNA,  tracheomalacia s/p tracheoplasty, ESBL proteus infections (sputum),  diabetes, paroxysmal A-fib on coumadin, colorectal cancer many years ago status post colostomy, recent hospitalization at an outside hospital  for acute respiratory failure with hypoxia secondary to asthma/COPD exacerbation and bronchopneumonia 6/5/2023 - 6/16/2023), and AVR 2022 with Dr. Cabrera.  Pt admits to chronic SOB and cough for years and is up to date on vaccines. Dr. Allison follows as an outpatient for her COPD. s/p 9/6 TAVR- [facility] with Dr. Gonsalves and Dr. Leahy. discharged home 9/10. Pt presents to office today for f/u visit  w/ c/o of worsening SOB. Pt to be admitted for further workup and eval. Pt stable at this time. VSS; O2 sats 97% RA.  ,H/o  ,bronchiectasis, tracheomalacia s/p tracheoplasty who presents after a recent admission for bronchiectasis exacerbation (6/2023, treated with Ertapenem for ESBL proteus), and again in 9/2023 for an acute flare whose hospital course was complicated by identification of severe AS requiring valve in valve TAVR 9/7/23    bronchiectasis  9/13 Pulm eval /rec Routine sputum culture, urine strep/legionella, RVP, COVID  3 sputum AFB   Considering culture history would start ertapenem and vancomycin for MRSA   Bronchodilator: Standing duonebs q 4 hours, nebulized pulmicort  -  iv steroids solumedrol 20 iv q 8  - vest therapy  - mucous for culture and sensitivity  - nebulized mucomyst  - Ensure she is getting twice per day airway clearance: Albuterol nebulizer followed by saline nebulizer followed by cough assist device and encouragement to cough and clear  - Pulmonary will continue to follow  - steroid taper as per pulm  - pos sputum  cultures - ID to follow up      S/P TAVR (transcatheter aortic valve replacement).   ·  Plan: Structural Team Following   Continue with Plavix 75 mg PO daily   Continue with Mexiletine 200 mg every 8 hours   Continue with Atorvastatin 20 mg PO HS    Daily EKG   Increase activity as tolerated.   Encourage Chest PT / Pulmonary toileting and Incentive spirometry every 1 hour x 10 while awake.   Continue with PUD and DVT prophylaxis.   Daily Shower     shingles  - valcyte 1 gram tic for 7 days      Uncontrolled type 2 diabetes mellitus with hyperglycemia.   ·  Plan: Test BG ac and hs and 2am if eating. Q6H while NPO  -restart Lantus 32 units sq hs   - restart  Admelog 15-16-18 units qac   Hold if NPO or eating less than 50% of meals.  -  moderate correction scale ac meals and moderate correction hs and 2am.      Plan to d/c on basal bolus.   Outpt. endo follow-up. Dr Saavedra  Outpt. optho, podiatry, micro/cr  Consider Freesytle Luis E 3  upon discharge.    Essential hypertension.   ·  Plan: Goal BP <130/80   - cont current meds     Hyperlipidemia.   ·  Plan: LDL goal <70 due to DM  Pt LDL N/A  Order fasting lipid if not recently done  Statin as noted above     F/u levels as out pt.    H/O adrenal insufficiency.   ·  Plan: On chronic steroids at home > medrol 8mg qd from admission in 2022      Atrial fibrillation.   ·  Plan: Continue with Mexiletine 200 mg every 8 hours   Coumadin 6 mg PO tonight   Daily PT/ INR.

## 2023-09-20 NOTE — PROGRESS NOTE ADULT - SUBJECTIVE AND OBJECTIVE BOX
infectious diseases progress note:    Patient is a 72y old  Female who presents with a chief complaint of sob (20 Sep 2023 13:05)        Heart failure             Allergies    tetanus toxoid (Short breath)  Dilaudid (Short breath)  codeine (Short breath)  iodine (Short breath; Swelling)  Avelox (Short breath; Pruritus)  shellfish (Anaphylaxis)  cefepime (Anaphylaxis)  aspirin (Short breath)  Valium (Short breath)  penicillin (Anaphylaxis)    Intolerances        ANTIBIOTICS/RELEVANT:  antimicrobials  azithromycin  IVPB 500 milliGRAM(s) IV Intermittent every 24 hours  azithromycin  IVPB      valACYclovir 1000 milliGRAM(s) Oral three times a day    immunologic:    OTHER:  acetaminophen     Tablet .. 650 milliGRAM(s) Oral every 6 hours PRN  acetylcysteine 10%  Inhalation 2 milliLiter(s) Inhalation every 8 hours  albuterol/ipratropium for Nebulization 3 milliLiter(s) Nebulizer every 6 hours  ascorbic acid 500 milliGRAM(s) Oral daily  atorvastatin 20 milliGRAM(s) Oral at bedtime  budesonide  80 MICROgram(s)/formoterol 4.5 MICROgram(s) Inhaler 2 Puff(s) Inhalation two times a day  chlorhexidine 2% Cloths 1 Application(s) Topical <User Schedule>  clopidogrel Tablet 75 milliGRAM(s) Oral daily  diphenhydrAMINE 25 milliGRAM(s) Oral every 4 hours PRN  gabapentin 100 milliGRAM(s) Oral every 8 hours  guaiFENesin  milliGRAM(s) Oral every 12 hours  hydrocortisone 1% Cream 1 Application(s) Topical two times a day  insulin glargine Injectable (LANTUS) 28 Unit(s) SubCutaneous at bedtime  insulin lispro (ADMELOG) corrective regimen sliding scale   SubCutaneous three times a day before meals  insulin lispro (ADMELOG) corrective regimen sliding scale   SubCutaneous <User Schedule>  insulin lispro Injectable (ADMELOG) 18 Unit(s) SubCutaneous before lunch  insulin lispro Injectable (ADMELOG) 16 Unit(s) SubCutaneous before dinner  insulin lispro Injectable (ADMELOG) 4 Unit(s) SubCutaneous at bedtime  insulin lispro Injectable (ADMELOG) 20 Unit(s) SubCutaneous before breakfast  lidocaine   4% Patch 2 Patch Transdermal daily  methylPREDNISolone sodium succinate Injectable 20 milliGRAM(s) IV Push every 8 hours  mexiletine 200 milliGRAM(s) Oral every 8 hours  multivitamin 1 Tablet(s) Oral daily  mupirocin 2% Nasal 1 Application(s) Both Nostrils two times a day  pantoprazole    Tablet 40 milliGRAM(s) Oral daily  polyethylene glycol 3350 17 Gram(s) Oral two times a day  polyethylene glycol 3350 17 Gram(s) Oral daily  senna 2 Tablet(s) Oral at bedtime  simethicone 80 milliGRAM(s) Chew every 12 hours PRN  tiotropium 2.5 MICROgram(s) Inhaler 2 Puff(s) Inhalation daily  traMADol 50 milliGRAM(s) Oral every 8 hours PRN      Objective:  Vital Signs Last 24 Hrs  T(C): 36.7 (20 Sep 2023 11:02), Max: 37.1 (19 Sep 2023 21:06)  T(F): 98 (20 Sep 2023 11:02), Max: 98.8 (19 Sep 2023 21:06)  HR: 78 (20 Sep 2023 11:02) (76 - 85)  BP: 143/81 (20 Sep 2023 11:02) (116/65 - 187/80)  BP(mean): --  RR: 17 (20 Sep 2023 04:00) (17 - 18)  SpO2: 98% (20 Sep 2023 11:02) (98% - 99%)    Parameters below as of 20 Sep 2023 11:02  Patient On (Oxygen Delivery Method): room air           Eyes:EBER, EOMI  Ear/Nose/Throat: no oral lesion, no sinus tenderness on percussion	  Neck:no JVD, no lymphadenopathy, supple  Respiratory: CTA barry  Cardiovascular: S1S2 RRR, no murmurs  Gastrointestinal:soft, (+) BS, no HSM  Extremities:no e/e/c        LABS:                        9.5    16.68 )-----------( 343      ( 20 Sep 2023 05:59 )             32.2     09-20    140  |  103  |  26<H>  ----------------------------<  262<H>  4.4   |  24  |  0.57    Ca    9.1      20 Sep 2023 05:59      PT/INR - ( 20 Sep 2023 06:00 )   PT: 30.8 sec;   INR: 2.89 ratio         PTT - ( 20 Sep 2023 06:00 )  PTT:33.5 sec  Urinalysis Basic - ( 20 Sep 2023 05:59 )    Color: x / Appearance: x / SG: x / pH: x  Gluc: 262 mg/dL / Ketone: x  / Bili: x / Urobili: x   Blood: x / Protein: x / Nitrite: x   Leuk Esterase: x / RBC: x / WBC x   Sq Epi: x / Non Sq Epi: x / Bacteria: x          MICROBIOLOGY:    RECENT CULTURES:  09-18 @ 00:30 .Sputum Sputum   GARRETT    Few-moderate polymorphonuclear leukocytes per low power field  Few Squamous epithelial cells per low power field  Few-moderate Gram positive cocci in pairs seen per oil power field  Few Gram Positive Rods seen per oil power field  Few Gram NegativeRods seen per oil power field  Few Yeast like cells seen per oil power field    Proteus mirabilis ESBL  Proteus mirabilis ESBL     Numerous Proteus mirabilis ESBL  Normal Respiratory Jessica present    09-13 @ 16:29 .Sputum Sputum                Culture is being performed.          RESPIRATORY CULTURES:              RADIOLOGY & ADDITIONAL STUDIES:        Pager [phone]  After 5 pm/weekends or if no response :[phone]

## 2023-09-21 LAB
ANION GAP SERPL CALC-SCNC: 12 MMOL/L — SIGNIFICANT CHANGE UP (ref 5–17)
APTT BLD: 34.8 SEC — SIGNIFICANT CHANGE UP (ref 24.5–35.6)
BUN SERPL-MCNC: 26 MG/DL — HIGH (ref 7–23)
CALCIUM SERPL-MCNC: 9.1 MG/DL — SIGNIFICANT CHANGE UP (ref 8.4–10.5)
CHLORIDE SERPL-SCNC: 106 MMOL/L — SIGNIFICANT CHANGE UP (ref 96–108)
CO2 SERPL-SCNC: 22 MMOL/L — SIGNIFICANT CHANGE UP (ref 22–31)
CREAT SERPL-MCNC: 0.59 MG/DL — SIGNIFICANT CHANGE UP (ref 0.5–1.3)
EGFR: 95 ML/MIN/1.73M2 — SIGNIFICANT CHANGE UP
GLUCOSE BLDC GLUCOMTR-MCNC: 198 MG/DL — HIGH (ref 70–99)
GLUCOSE BLDC GLUCOMTR-MCNC: 233 MG/DL — HIGH (ref 70–99)
GLUCOSE BLDC GLUCOMTR-MCNC: 289 MG/DL — HIGH (ref 70–99)
GLUCOSE BLDC GLUCOMTR-MCNC: 309 MG/DL — HIGH (ref 70–99)
GLUCOSE BLDC GLUCOMTR-MCNC: 389 MG/DL — HIGH (ref 70–99)
GLUCOSE SERPL-MCNC: 331 MG/DL — HIGH (ref 70–99)
HCT VFR BLD CALC: 31.9 % — LOW (ref 34.5–45)
HGB BLD-MCNC: 9.4 G/DL — LOW (ref 11.5–15.5)
INR BLD: 3.71 RATIO — HIGH (ref 0.85–1.18)
MCHC RBC-ENTMCNC: 23 PG — LOW (ref 27–34)
MCHC RBC-ENTMCNC: 29.5 GM/DL — LOW (ref 32–36)
MCV RBC AUTO: 78 FL — LOW (ref 80–100)
NRBC # BLD: 1 /100 WBCS — HIGH (ref 0–0)
PLATELET # BLD AUTO: 367 K/UL — SIGNIFICANT CHANGE UP (ref 150–400)
POTASSIUM SERPL-MCNC: 4.4 MMOL/L — SIGNIFICANT CHANGE UP (ref 3.5–5.3)
POTASSIUM SERPL-SCNC: 4.4 MMOL/L — SIGNIFICANT CHANGE UP (ref 3.5–5.3)
PROTHROM AB SERPL-ACNC: 39.3 SEC — HIGH (ref 9.5–13)
RBC # BLD: 4.09 M/UL — SIGNIFICANT CHANGE UP (ref 3.8–5.2)
RBC # FLD: 25.2 % — HIGH (ref 10.3–14.5)
SODIUM SERPL-SCNC: 140 MMOL/L — SIGNIFICANT CHANGE UP (ref 135–145)
WBC # BLD: 14.47 K/UL — HIGH (ref 3.8–10.5)
WBC # FLD AUTO: 14.47 K/UL — HIGH (ref 3.8–10.5)

## 2023-09-21 PROCEDURE — 99232 SBSQ HOSP IP/OBS MODERATE 35: CPT

## 2023-09-21 RX ORDER — INSULIN GLARGINE 100 [IU]/ML
32 INJECTION, SOLUTION SUBCUTANEOUS AT BEDTIME
Refills: 0 | Status: DISCONTINUED | OUTPATIENT
Start: 2023-09-21 | End: 2023-09-22

## 2023-09-21 RX ORDER — DIPHENHYDRAMINE HCL 50 MG
50 CAPSULE ORAL DAILY
Refills: 0 | Status: COMPLETED | OUTPATIENT
Start: 2023-09-21 | End: 2023-09-25

## 2023-09-21 RX ORDER — ERTAPENEM SODIUM 1 G/1
1000 INJECTION, POWDER, LYOPHILIZED, FOR SOLUTION INTRAMUSCULAR; INTRAVENOUS EVERY 24 HOURS
Refills: 0 | Status: COMPLETED | OUTPATIENT
Start: 2023-09-21 | End: 2023-09-25

## 2023-09-21 RX ADMIN — Medication 500 MILLIGRAM(S): at 11:35

## 2023-09-21 RX ADMIN — Medication 100 MILLIGRAM(S): at 22:05

## 2023-09-21 RX ADMIN — POLYETHYLENE GLYCOL 3350 17 GRAM(S): 17 POWDER, FOR SOLUTION ORAL at 11:34

## 2023-09-21 RX ADMIN — BUDESONIDE AND FORMOTEROL FUMARATE DIHYDRATE 2 PUFF(S): 160; 4.5 AEROSOL RESPIRATORY (INHALATION) at 05:54

## 2023-09-21 RX ADMIN — CHLORHEXIDINE GLUCONATE 1 APPLICATION(S): 213 SOLUTION TOPICAL at 05:48

## 2023-09-21 RX ADMIN — Medication 200 MILLIGRAM(S): at 17:33

## 2023-09-21 RX ADMIN — MEXILETINE HYDROCHLORIDE 200 MILLIGRAM(S): 150 CAPSULE ORAL at 05:49

## 2023-09-21 RX ADMIN — INSULIN GLARGINE 32 UNIT(S): 100 INJECTION, SOLUTION SUBCUTANEOUS at 21:58

## 2023-09-21 RX ADMIN — Medication 6: at 09:01

## 2023-09-21 RX ADMIN — AZITHROMYCIN 255 MILLIGRAM(S): 500 TABLET, FILM COATED ORAL at 11:47

## 2023-09-21 RX ADMIN — Medication 2 MILLILITER(S): at 21:57

## 2023-09-21 RX ADMIN — TRAMADOL HYDROCHLORIDE 50 MILLIGRAM(S): 50 TABLET ORAL at 23:25

## 2023-09-21 RX ADMIN — SENNA PLUS 2 TABLET(S): 8.6 TABLET ORAL at 22:05

## 2023-09-21 RX ADMIN — Medication 18 UNIT(S): at 11:55

## 2023-09-21 RX ADMIN — Medication 200 MILLIGRAM(S): at 23:29

## 2023-09-21 RX ADMIN — Medication 200 MILLIGRAM(S): at 13:22

## 2023-09-21 RX ADMIN — Medication 600 MILLIGRAM(S): at 05:50

## 2023-09-21 RX ADMIN — MUPIROCIN 1 APPLICATION(S): 20 OINTMENT TOPICAL at 05:50

## 2023-09-21 RX ADMIN — Medication 3 MILLILITER(S): at 23:13

## 2023-09-21 RX ADMIN — Medication 20 UNIT(S): at 09:01

## 2023-09-21 RX ADMIN — Medication 1 TABLET(S): at 11:35

## 2023-09-21 RX ADMIN — ATORVASTATIN CALCIUM 20 MILLIGRAM(S): 80 TABLET, FILM COATED ORAL at 21:58

## 2023-09-21 RX ADMIN — Medication 4: at 02:30

## 2023-09-21 RX ADMIN — Medication 4: at 17:32

## 2023-09-21 RX ADMIN — CLOPIDOGREL BISULFATE 75 MILLIGRAM(S): 75 TABLET, FILM COATED ORAL at 11:35

## 2023-09-21 RX ADMIN — Medication 600 MILLIGRAM(S): at 17:34

## 2023-09-21 RX ADMIN — Medication 1 APPLICATION(S): at 18:37

## 2023-09-21 RX ADMIN — Medication 100 MILLIGRAM(S): at 13:19

## 2023-09-21 RX ADMIN — Medication 20 MILLIGRAM(S): at 21:57

## 2023-09-21 RX ADMIN — TIOTROPIUM BROMIDE 2 PUFF(S): 18 CAPSULE ORAL; RESPIRATORY (INHALATION) at 11:34

## 2023-09-21 RX ADMIN — GABAPENTIN 100 MILLIGRAM(S): 400 CAPSULE ORAL at 05:50

## 2023-09-21 RX ADMIN — SIMETHICONE 80 MILLIGRAM(S): 80 TABLET, CHEWABLE ORAL at 23:14

## 2023-09-21 RX ADMIN — Medication 20 MILLIGRAM(S): at 05:49

## 2023-09-21 RX ADMIN — BUDESONIDE AND FORMOTEROL FUMARATE DIHYDRATE 2 PUFF(S): 160; 4.5 AEROSOL RESPIRATORY (INHALATION) at 17:33

## 2023-09-21 RX ADMIN — Medication 16 UNIT(S): at 17:33

## 2023-09-21 RX ADMIN — MEXILETINE HYDROCHLORIDE 200 MILLIGRAM(S): 150 CAPSULE ORAL at 21:56

## 2023-09-21 RX ADMIN — Medication 50 MILLIGRAM(S): at 21:54

## 2023-09-21 RX ADMIN — Medication 3 MILLILITER(S): at 17:34

## 2023-09-21 RX ADMIN — GABAPENTIN 100 MILLIGRAM(S): 400 CAPSULE ORAL at 21:56

## 2023-09-21 RX ADMIN — Medication 10: at 11:55

## 2023-09-21 RX ADMIN — VALACYCLOVIR 1000 MILLIGRAM(S): 500 TABLET, FILM COATED ORAL at 05:49

## 2023-09-21 RX ADMIN — Medication 3 MILLILITER(S): at 11:34

## 2023-09-21 RX ADMIN — PANTOPRAZOLE SODIUM 40 MILLIGRAM(S): 20 TABLET, DELAYED RELEASE ORAL at 11:35

## 2023-09-21 RX ADMIN — ERTAPENEM SODIUM 120 MILLIGRAM(S): 1 INJECTION, POWDER, LYOPHILIZED, FOR SOLUTION INTRAMUSCULAR; INTRAVENOUS at 23:15

## 2023-09-21 NOTE — PROGRESS NOTE ADULT - SUBJECTIVE AND OBJECTIVE BOX
DATE OF SERVICE: 09-21-23 @ 12:48    Patient is a 72y old  Female who presents with a chief complaint of sob (21 Sep 2023 09:34)      SUBJECTIVE / OVERNIGHT EVENTS:  c/o cough    MEDICATIONS  (STANDING):  acetylcysteine 10%  Inhalation 2 milliLiter(s) Inhalation every 8 hours  albuterol/ipratropium for Nebulization 3 milliLiter(s) Nebulizer every 6 hours  ascorbic acid 500 milliGRAM(s) Oral daily  atorvastatin 20 milliGRAM(s) Oral at bedtime  azithromycin  IVPB 500 milliGRAM(s) IV Intermittent every 24 hours  azithromycin  IVPB      benzonatate 100 milliGRAM(s) Oral three times a day  budesonide  80 MICROgram(s)/formoterol 4.5 MICROgram(s) Inhaler 2 Puff(s) Inhalation two times a day  chlorhexidine 2% Cloths 1 Application(s) Topical <User Schedule>  clopidogrel Tablet 75 milliGRAM(s) Oral daily  diphenhydrAMINE Injectable 50 milliGRAM(s) IV Push daily  ertapenem  IVPB 1000 milliGRAM(s) IV Intermittent every 24 hours  gabapentin 100 milliGRAM(s) Oral every 8 hours  guaiFENesin  milliGRAM(s) Oral every 12 hours  guaiFENesin Oral Liquid (Sugar-Free) 200 milliGRAM(s) Oral every 6 hours  hydrocortisone 1% Cream 1 Application(s) Topical two times a day  insulin glargine Injectable (LANTUS) 32 Unit(s) SubCutaneous at bedtime  insulin lispro (ADMELOG) corrective regimen sliding scale   SubCutaneous three times a day before meals  insulin lispro (ADMELOG) corrective regimen sliding scale   SubCutaneous <User Schedule>  insulin lispro Injectable (ADMELOG) 18 Unit(s) SubCutaneous before lunch  insulin lispro Injectable (ADMELOG) 16 Unit(s) SubCutaneous before dinner  insulin lispro Injectable (ADMELOG) 4 Unit(s) SubCutaneous at bedtime  insulin lispro Injectable (ADMELOG) 20 Unit(s) SubCutaneous before breakfast  lidocaine   4% Patch 2 Patch Transdermal daily  methylPREDNISolone sodium succinate Injectable 20 milliGRAM(s) IV Push every 8 hours  mexiletine 200 milliGRAM(s) Oral every 8 hours  multivitamin 1 Tablet(s) Oral daily  mupirocin 2% Nasal 1 Application(s) Both Nostrils two times a day  pantoprazole    Tablet 40 milliGRAM(s) Oral daily  polyethylene glycol 3350 17 Gram(s) Oral daily  polyethylene glycol 3350 17 Gram(s) Oral two times a day  senna 2 Tablet(s) Oral at bedtime  tiotropium 2.5 MICROgram(s) Inhaler 2 Puff(s) Inhalation daily    MEDICATIONS  (PRN):  acetaminophen     Tablet .. 650 milliGRAM(s) Oral every 6 hours PRN Temp greater or equal to 38C (100.4F), Mild Pain (1 - 3)  diphenhydrAMINE 25 milliGRAM(s) Oral every 4 hours PRN Rash and/or Itching  simethicone 80 milliGRAM(s) Chew every 12 hours PRN Gas  traMADol 50 milliGRAM(s) Oral every 8 hours PRN Moderate Pain (4 - 6)      Vital Signs Last 24 Hrs  T(C): 36.6 (21 Sep 2023 11:21), Max: 36.9 (20 Sep 2023 20:03)  T(F): 97.9 (21 Sep 2023 11:21), Max: 98.5 (20 Sep 2023 20:03)  HR: 80 (21 Sep 2023 11:21) (78 - 89)  BP: 112/69 (21 Sep 2023 11:21) (110/68 - 149/79)  BP(mean): --  RR: 18 (21 Sep 2023 11:21) (18 - 18)  SpO2: 98% (21 Sep 2023 11:21) (98% - 100%)    Parameters below as of 21 Sep 2023 11:21  Patient On (Oxygen Delivery Method): room air      CAPILLARY BLOOD GLUCOSE      POCT Blood Glucose.: 389 mg/dL (21 Sep 2023 11:52)  POCT Blood Glucose.: 289 mg/dL (21 Sep 2023 08:55)  POCT Blood Glucose.: 309 mg/dL (21 Sep 2023 02:14)  POCT Blood Glucose.: 177 mg/dL (20 Sep 2023 21:12)  POCT Blood Glucose.: 126 mg/dL (20 Sep 2023 17:30)    I&O's Summary    20 Sep 2023 07:01  -  21 Sep 2023 07:00  --------------------------------------------------------  IN: 720 mL / OUT: 0 mL / NET: 720 mL        PHYSICAL EXAM:  GENERAL: NAD, well-developed  HEAD:  Atraumatic, Normocephalic  EYES: EOMI, PERRLA, conjunctiva and sclera clear  NECK: Supple, No JVD  CHEST/LUNG: barry rhonchi, wheezing  HEART: Regular rate and rhythm; No murmurs, rubs, or gallops  ABDOMEN: Soft, Nontender, Nondistended; Bowel sounds present  EXTREMITIES:  2+ Peripheral Pulses, No clubbing, cyanosis, or edema  PSYCH: AAOx3  NEUROLOGY: non-focal  SKIN: No rashes or lesions    LABS:                        9.4    14.47 )-----------( 367      ( 21 Sep 2023 06:38 )             31.9     09-21    140  |  106  |  26<H>  ----------------------------<  331<H>  4.4   |  22  |  0.59    Ca    9.1      21 Sep 2023 06:40      PT/INR - ( 21 Sep 2023 06:42 )   PT: 39.3 sec;   INR: 3.71 ratio         PTT - ( 21 Sep 2023 06:42 )  PTT:34.8 sec      Urinalysis Basic - ( 21 Sep 2023 06:40 )    Color: x / Appearance: x / SG: x / pH: x  Gluc: 331 mg/dL / Ketone: x  / Bili: x / Urobili: x   Blood: x / Protein: x / Nitrite: x   Leuk Esterase: x / RBC: x / WBC x   Sq Epi: x / Non Sq Epi: x / Bacteria: x        RADIOLOGY & ADDITIONAL TESTS:    Imaging Personally Reviewed:    Consultant(s) Notes Reviewed:      Care Discussed with Consultants/Other Providers:

## 2023-09-21 NOTE — PROGRESS NOTE ADULT - SUBJECTIVE AND OBJECTIVE BOX
Chief Complaint: Diabetes Mellitus follow up    INTERVAL HX: patient seen at the bedside, no new complaints.     BGs mainly within target except for fasting still remains >200.  Ongoing methylpred 20mg TID   No  hypoglycemia       Review of Systems:  General: As above  GI: No nausea, vomiting  Endocrine: no  S&Sx of hypoglycemia    Allergies    tetanus toxoid (Short breath)  Dilaudid (Short breath)  codeine (Short breath)  iodine (Short breath; Swelling)  Avelox (Short breath; Pruritus)  shellfish (Anaphylaxis)  cefepime (Anaphylaxis)  aspirin (Short breath)  Valium (Short breath)  penicillin (Anaphylaxis)    Intolerances      MEDICATIONS  (STANDING):  acetylcysteine 10%  Inhalation 2 milliLiter(s) Inhalation every 8 hours  albuterol/ipratropium for Nebulization 3 milliLiter(s) Nebulizer every 6 hours  ascorbic acid 500 milliGRAM(s) Oral daily  atorvastatin 20 milliGRAM(s) Oral at bedtime  azithromycin  IVPB      azithromycin  IVPB 500 milliGRAM(s) IV Intermittent every 24 hours  budesonide  80 MICROgram(s)/formoterol 4.5 MICROgram(s) Inhaler 2 Puff(s) Inhalation two times a day  chlorhexidine 2% Cloths 1 Application(s) Topical <User Schedule>  clopidogrel Tablet 75 milliGRAM(s) Oral daily  gabapentin 100 milliGRAM(s) Oral every 8 hours  guaiFENesin  milliGRAM(s) Oral every 12 hours  hydrocortisone 1% Cream 1 Application(s) Topical two times a day  insulin glargine Injectable (LANTUS) 28 Unit(s) SubCutaneous at bedtime  insulin lispro (ADMELOG) corrective regimen sliding scale   SubCutaneous <User Schedule>  insulin lispro (ADMELOG) corrective regimen sliding scale   SubCutaneous three times a day before meals  insulin lispro Injectable (ADMELOG) 18 Unit(s) SubCutaneous before lunch  insulin lispro Injectable (ADMELOG) 16 Unit(s) SubCutaneous before dinner  insulin lispro Injectable (ADMELOG) 4 Unit(s) SubCutaneous at bedtime  insulin lispro Injectable (ADMELOG) 20 Unit(s) SubCutaneous before breakfast  lidocaine   4% Patch 2 Patch Transdermal daily  methylPREDNISolone sodium succinate Injectable 20 milliGRAM(s) IV Push every 8 hours  mexiletine 200 milliGRAM(s) Oral every 8 hours  multivitamin 1 Tablet(s) Oral daily  mupirocin 2% Nasal 1 Application(s) Both Nostrils two times a day  pantoprazole    Tablet 40 milliGRAM(s) Oral daily  polyethylene glycol 3350 17 Gram(s) Oral daily  polyethylene glycol 3350 17 Gram(s) Oral two times a day  senna 2 Tablet(s) Oral at bedtime  tiotropium 2.5 MICROgram(s) Inhaler 2 Puff(s) Inhalation daily      atorvastatin   20 milliGRAM(s) Oral (09-20-23 @ 21:29)    insulin glargine Injectable (LANTUS)   28 Unit(s) SubCutaneous (09-20-23 @ 21:29)    insulin lispro (ADMELOG) corrective regimen sliding scale   6 Unit(s) SubCutaneous (09-21-23 @ 09:01)    insulin lispro (ADMELOG) corrective regimen sliding scale   4  SubCutaneous (09-21-23 @ 02:30)    insulin lispro Injectable (ADMELOG)   16 Unit(s) SubCutaneous (09-20-23 @ 18:07)    insulin lispro Injectable (ADMELOG)   18 Unit(s) SubCutaneous (09-20-23 @ 12:21)    insulin lispro Injectable (ADMELOG)   20 Unit(s) SubCutaneous (09-21-23 @ 09:01)    methylPREDNISolone sodium succinate Injectable   20 milliGRAM(s) IV Push (09-21-23 @ 05:49)   20 milliGRAM(s) IV Push (09-20-23 @ 21:28)   20 milliGRAM(s) IV Push (09-20-23 @ 13:59)        PHYSICAL EXAM:  VITALS: T(C): 36.8 (09-21-23 @ 04:11)  T(F): 98.3 (09-21-23 @ 04:11), Max: 98.5 (09-20-23 @ 20:03)  HR: 78 (09-21-23 @ 04:11) (78 - 89)  BP: 110/68 (09-21-23 @ 04:11) (110/68 - 149/79)  RR:  (18 - 18)  SpO2:  (98% - 100%)  Wt(kg): --  GENERAL: NAD  Respiratory: Respirations unlabored   Extremities: Warm, no edema  NEURO: Alert , appropriate     LABS:  POCT Blood Glucose.: 289 mg/dL (09-21-23 @ 08:55)  POCT Blood Glucose.: 309 mg/dL (09-21-23 @ 02:14)  POCT Blood Glucose.: 177 mg/dL (09-20-23 @ 21:12)  POCT Blood Glucose.: 126 mg/dL (09-20-23 @ 17:30)  POCT Blood Glucose.: 114 mg/dL (09-20-23 @ 11:53)  POCT Blood Glucose.: 263 mg/dL (09-20-23 @ 07:43)  POCT Blood Glucose.: 95 mg/dL (09-20-23 @ 02:10)  POCT Blood Glucose.: 166 mg/dL (09-19-23 @ 21:28)  POCT Blood Glucose.: 292 mg/dL (09-19-23 @ 18:36)  POCT Blood Glucose.: 209 mg/dL (09-19-23 @ 13:37)  POCT Blood Glucose.: 281 mg/dL (09-19-23 @ 07:37)  POCT Blood Glucose.: 305 mg/dL (09-19-23 @ 01:59)  POCT Blood Glucose.: 172 mg/dL (09-18-23 @ 21:39)  POCT Blood Glucose.: 207 mg/dL (09-18-23 @ 18:09)  POCT Blood Glucose.: 283 mg/dL (09-18-23 @ 13:53)                          9.4    14.47 )-----------( 367      ( 21 Sep 2023 06:38 )             31.9     09-21    140  |  106  |  26<H>  ----------------------------<  331<H>  4.4   |  22  |  0.59    Ca    9.1      21 Sep 2023 06:40      eGFR: 95 mL/min/1.73m2 (21 Sep 2023 06:40)      Thyroid Function Tests:  09-06 @ 09:32 TSH 0.49 FreeT4 1.2 T3 82 Anti TPO -- Anti Thyroglobulin Ab -- TSI --      A1C with Estimated Average Glucose Result: 5.7 % (09-08-23 @ 06:39)    Estimated Average Glucose: 117 mg/dL (09-08-23 @ 06:39)        Diet, Regular:   Consistent Carbohydrate Evening Snack (CSTCHOSN)  Isidro(7 Gm Arginine/7 Gm Glut/1.2 Gm HMB     Qty per Day:  2  Supplement Feeding Modality:  Oral  Glucerna Shake Cans or Servings Per Day:  3       Frequency:  Daily (09-15-23 @ 19:02) [Active]             Chief Complaint: Diabetes Mellitus follow up    INTERVAL HX: patient seen at the bedside, no new complaints. Does have persistent dry cough, currently on antibiotics and steroids. Good appetite.     BGs mainly within target except for fasting still remains >200.  Ongoing methylpred 20mg TID   No  hypoglycemia       Review of Systems:  General: As above  GI: No nausea, vomiting  Endocrine: no  S&Sx of hypoglycemia    Allergies    tetanus toxoid (Short breath)  Dilaudid (Short breath)  codeine (Short breath)  iodine (Short breath; Swelling)  Avelox (Short breath; Pruritus)  shellfish (Anaphylaxis)  cefepime (Anaphylaxis)  aspirin (Short breath)  Valium (Short breath)  penicillin (Anaphylaxis)    Intolerances      MEDICATIONS  (STANDING):  acetylcysteine 10%  Inhalation 2 milliLiter(s) Inhalation every 8 hours  albuterol/ipratropium for Nebulization 3 milliLiter(s) Nebulizer every 6 hours  ascorbic acid 500 milliGRAM(s) Oral daily  atorvastatin 20 milliGRAM(s) Oral at bedtime  azithromycin  IVPB      azithromycin  IVPB 500 milliGRAM(s) IV Intermittent every 24 hours  budesonide  80 MICROgram(s)/formoterol 4.5 MICROgram(s) Inhaler 2 Puff(s) Inhalation two times a day  chlorhexidine 2% Cloths 1 Application(s) Topical <User Schedule>  clopidogrel Tablet 75 milliGRAM(s) Oral daily  gabapentin 100 milliGRAM(s) Oral every 8 hours  guaiFENesin  milliGRAM(s) Oral every 12 hours  hydrocortisone 1% Cream 1 Application(s) Topical two times a day  insulin glargine Injectable (LANTUS) 28 Unit(s) SubCutaneous at bedtime  insulin lispro (ADMELOG) corrective regimen sliding scale   SubCutaneous <User Schedule>  insulin lispro (ADMELOG) corrective regimen sliding scale   SubCutaneous three times a day before meals  insulin lispro Injectable (ADMELOG) 18 Unit(s) SubCutaneous before lunch  insulin lispro Injectable (ADMELOG) 16 Unit(s) SubCutaneous before dinner  insulin lispro Injectable (ADMELOG) 4 Unit(s) SubCutaneous at bedtime  insulin lispro Injectable (ADMELOG) 20 Unit(s) SubCutaneous before breakfast  lidocaine   4% Patch 2 Patch Transdermal daily  methylPREDNISolone sodium succinate Injectable 20 milliGRAM(s) IV Push every 8 hours  mexiletine 200 milliGRAM(s) Oral every 8 hours  multivitamin 1 Tablet(s) Oral daily  mupirocin 2% Nasal 1 Application(s) Both Nostrils two times a day  pantoprazole    Tablet 40 milliGRAM(s) Oral daily  polyethylene glycol 3350 17 Gram(s) Oral daily  polyethylene glycol 3350 17 Gram(s) Oral two times a day  senna 2 Tablet(s) Oral at bedtime  tiotropium 2.5 MICROgram(s) Inhaler 2 Puff(s) Inhalation daily      atorvastatin   20 milliGRAM(s) Oral (09-20-23 @ 21:29)    insulin glargine Injectable (LANTUS)   28 Unit(s) SubCutaneous (09-20-23 @ 21:29)    insulin lispro (ADMELOG) corrective regimen sliding scale   6 Unit(s) SubCutaneous (09-21-23 @ 09:01)    insulin lispro (ADMELOG) corrective regimen sliding scale   4  SubCutaneous (09-21-23 @ 02:30)    insulin lispro Injectable (ADMELOG)   16 Unit(s) SubCutaneous (09-20-23 @ 18:07)    insulin lispro Injectable (ADMELOG)   18 Unit(s) SubCutaneous (09-20-23 @ 12:21)    insulin lispro Injectable (ADMELOG)   20 Unit(s) SubCutaneous (09-21-23 @ 09:01)    methylPREDNISolone sodium succinate Injectable   20 milliGRAM(s) IV Push (09-21-23 @ 05:49)   20 milliGRAM(s) IV Push (09-20-23 @ 21:28)   20 milliGRAM(s) IV Push (09-20-23 @ 13:59)        PHYSICAL EXAM:  VITALS: T(C): 36.8 (09-21-23 @ 04:11)  T(F): 98.3 (09-21-23 @ 04:11), Max: 98.5 (09-20-23 @ 20:03)  HR: 78 (09-21-23 @ 04:11) (78 - 89)  BP: 110/68 (09-21-23 @ 04:11) (110/68 - 149/79)  RR:  (18 - 18)  SpO2:  (98% - 100%)  Wt(kg): --  GENERAL: NAD  Respiratory: Respirations unlabored   Extremities: Warm, no edema  NEURO: Alert , appropriate     LABS:  POCT Blood Glucose.: 289 mg/dL (09-21-23 @ 08:55)  POCT Blood Glucose.: 309 mg/dL (09-21-23 @ 02:14)  POCT Blood Glucose.: 177 mg/dL (09-20-23 @ 21:12)  POCT Blood Glucose.: 126 mg/dL (09-20-23 @ 17:30)  POCT Blood Glucose.: 114 mg/dL (09-20-23 @ 11:53)  POCT Blood Glucose.: 263 mg/dL (09-20-23 @ 07:43)  POCT Blood Glucose.: 95 mg/dL (09-20-23 @ 02:10)  POCT Blood Glucose.: 166 mg/dL (09-19-23 @ 21:28)  POCT Blood Glucose.: 292 mg/dL (09-19-23 @ 18:36)  POCT Blood Glucose.: 209 mg/dL (09-19-23 @ 13:37)  POCT Blood Glucose.: 281 mg/dL (09-19-23 @ 07:37)  POCT Blood Glucose.: 305 mg/dL (09-19-23 @ 01:59)  POCT Blood Glucose.: 172 mg/dL (09-18-23 @ 21:39)  POCT Blood Glucose.: 207 mg/dL (09-18-23 @ 18:09)  POCT Blood Glucose.: 283 mg/dL (09-18-23 @ 13:53)                          9.4    14.47 )-----------( 367      ( 21 Sep 2023 06:38 )             31.9     09-21    140  |  106  |  26<H>  ----------------------------<  331<H>  4.4   |  22  |  0.59    Ca    9.1      21 Sep 2023 06:40      eGFR: 95 mL/min/1.73m2 (21 Sep 2023 06:40)      Thyroid Function Tests:  09-06 @ 09:32 TSH 0.49 FreeT4 1.2 T3 82 Anti TPO -- Anti Thyroglobulin Ab -- TSI --      A1C with Estimated Average Glucose Result: 5.7 % (09-08-23 @ 06:39)    Estimated Average Glucose: 117 mg/dL (09-08-23 @ 06:39)        Diet, Regular:   Consistent Carbohydrate Evening Snack (CSTCHOSN)  Isidro(7 Gm Arginine/7 Gm Glut/1.2 Gm HMB     Qty per Day:  2  Supplement Feeding Modality:  Oral  Glucerna Shake Cans or Servings Per Day:  3       Frequency:  Daily (09-15-23 @ 19:02) [Active]

## 2023-09-21 NOTE — PROGRESS NOTE ADULT - ASSESSMENT
72 y.o. F with PMH of asthma on chronic steroids, bronchiectasis, allergic rhinitis,  recurrent PNA,  tracheomalacia s/p tracheoplasty, ESBL proteus infections (sputum),  diabetes, paroxysmal A-fib on coumadin, colorectal cancer many years ago status post colostomy, recent hospitalization at an outside hospital  for acute respiratory failure with hypoxia secondary to asthma/COPD exacerbation and bronchopneumonia 6/5/2023 - 6/16/2023), and AVR 2022 with Dr. Cabrera.  Pt admits to chronic SOB and cough for years and is up to date on vaccines. Dr. Allison follows as an outpatient for her COPD. s/p 9/6 TAVR- [facility] with Dr. Gonsalves and Dr. Leahy. discharged home 9/10. Pt presents to office today for f/u visit  w/ c/o of worsening SOB. Pt to be admitted for further workup and eval. Pt stable at this time. VSS; O2 sats 97% RA.  ,H/o  ,bronchiectasis, tracheomalacia s/p tracheoplasty who presents after a recent admission for bronchiectasis exacerbation (6/2023, treated with Ertapenem for ESBL proteus), and again in 9/2023 for an acute flare whose hospital course was complicated by identification of severe AS requiring valve in valve TAVR 9/7/23    bronchiectasis  9/13 Pulm eval /rec Routine sputum culture, urine strep/legionella, RVP, COVID  3 sputum AFB   Considering culture history would start ertapenem and vancomycin for MRSA   Bronchodilator: Standing duonebs q 4 hours, nebulized pulmicort  -  iv steroids solumedrol 20 iv q 8  - vest therapy  - mucous for culture and sensitivity  - nebulized mucomyst  - Ensure she is getting twice per day airway clearance: Albuterol nebulizer followed by saline nebulizer followed by cough assist device and encouragement to cough and clear  - Pulmonary will continue to follow  - steroid taper as per pulm  - ertapenam per ID      S/P TAVR (transcatheter aortic valve replacement).   ·  Plan: Structural Team Following   Continue with Plavix 75 mg PO daily   Continue with Mexiletine 200 mg every 8 hours   Continue with Atorvastatin 20 mg PO HS    Daily EKG   Increase activity as tolerated.   Encourage Chest PT / Pulmonary toileting and Incentive spirometry every 1 hour x 10 while awake.   Continue with PUD and DVT prophylaxis.   Daily Shower     shingles  - valcyte for 7 days      Uncontrolled type 2 diabetes mellitus with hyperglycemia.   ·  Plan: Test BG ac and hs and 2am if eating. Q6H while NPO  -restart Lantus 32 units sq hs   - restart  Admelog 15-16-18 units qac   Hold if NPO or eating less than 50% of meals.  -  moderate correction scale ac meals and moderate correction hs and 2am.      Plan to d/c on basal bolus.   Outpt. endo follow-up. Dr Saavedra  Outpt. optho, podiatry, micro/cr  Consider Freesytle Luis E 3  upon discharge.    Essential hypertension.   ·  Plan: Goal BP <130/80   - cont current meds     Hyperlipidemia.   ·  Plan: LDL goal <70 due to DM  Pt LDL N/A  Order fasting lipid if not recently done  Statin as noted above     F/u levels as out pt.    H/O adrenal insufficiency.   ·  Plan: On chronic steroids at home      Atrial fibrillation.   ·  Plan: Continue with Mexiletine 200 mg every 8 hours   Coumadin 6 mg PO tonight   Daily PT/ INR.     72 y.o. F with PMH of asthma on chronic steroids, bronchiectasis, allergic rhinitis,  recurrent PNA,  tracheomalacia s/p tracheoplasty, ESBL proteus infections (sputum),  diabetes, paroxysmal A-fib on coumadin, colorectal cancer many years ago status post colostomy, recent hospitalization at an outside hospital  for acute respiratory failure with hypoxia secondary to asthma/COPD exacerbation and bronchopneumonia 6/5/2023 - 6/16/2023), and AVR 2022 with Dr. Cabrera.  Pt admits to chronic SOB and cough for years and is up to date on vaccines. Dr. Allison follows as an outpatient for her COPD. s/p 9/6 TAVR- [facility] with Dr. Gonsalves and Dr. Leahy. discharged home 9/10. Pt presents to office today for f/u visit  w/ c/o of worsening SOB. Pt to be admitted for further workup and eval. Pt stable at this time. VSS; O2 sats 97% RA.  ,H/o  ,bronchiectasis, tracheomalacia s/p tracheoplasty who presents after a recent admission for bronchiectasis exacerbation (6/2023, treated with Ertapenem for ESBL proteus), and again in 9/2023 for an acute flare whose hospital course was complicated by identification of severe AS requiring valve in valve TAVR 9/7/23    bronchiectasis  9/13 Pulm eval /rec Routine sputum culture, urine strep/legionella, RVP, COVID  3 sputum AFB   Considering culture history would start ertapenem and vancomycin for MRSA   Bronchodilator: Standing duonebs q 4 hours, nebulized pulmicort  -  iv steroids solumedrol 20 iv q 8  - vest therapy  - mucous for culture and sensitivity  - nebulized mucomyst  - Ensure she is getting twice per day airway clearance: Albuterol nebulizer followed by saline nebulizer followed by cough assist device and encouragement to cough and clear  - Pulmonary will continue to follow  - steroid taper as per pulm  - ertapenam per ID    cough  - robitussin  - tessalon    S/P TAVR (transcatheter aortic valve replacement).   ·  Plan: Structural Team Following   Continue with Plavix 75 mg PO daily   Continue with Mexiletine 200 mg every 8 hours   Continue with Atorvastatin 20 mg PO HS    Daily EKG   Increase activity as tolerated.   Encourage Chest PT / Pulmonary toileting and Incentive spirometry every 1 hour x 10 while awake.   Continue with PUD and DVT prophylaxis.   Daily Shower     shingles  - valcyte for 7 days      Uncontrolled type 2 diabetes mellitus with hyperglycemia.   ·  Plan: Test BG ac and hs and 2am if eating. Q6H while NPO  -restart Lantus 32 units sq hs   - restart  Admelog 15-16-18 units qac   Hold if NPO or eating less than 50% of meals.  -  moderate correction scale ac meals and moderate correction hs and 2am.      Plan to d/c on basal bolus.   Outpt. endo follow-up. Dr Saavedra  Outpt. optho, podiatry, micro/cr  Consider Freesytle Luis E 3  upon discharge.    Essential hypertension.   ·  Plan: Goal BP <130/80   - cont current meds     Hyperlipidemia.   ·  Plan: LDL goal <70 due to DM  Pt LDL N/A  Order fasting lipid if not recently done  Statin as noted above     F/u levels as out pt.    H/O adrenal insufficiency.   ·  Plan: On chronic steroids at home      Atrial fibrillation.   ·  Plan: Continue with Mexiletine 200 mg every 8 hours   Coumadin 6 mg PO tonight   Daily PT/ INR.

## 2023-09-21 NOTE — PROGRESS NOTE ADULT - ASSESSMENT
72 F w/h/o uncontrolled T2DM (A1C 9.4%) on basal/bolus insulin PTA. Unknown DM complications. Also COPD, secondary adrenal Insufficiency on chronic steroids, colorectal cancer s/p resection (colostomy bag), Chronic A fib on Eliquis, and tracheomalacia and multiple intubations, type A aortic dissection s/p aortic dissection repair on 9/07/22, sepsis. Pt well known to this provider from prior admissions. Pt recently admitted with SOB and found to have anemia and severe aortic stenosis>requiring valve in valve TAVR 9/7/23 discharged 9/10/23. Back with increasing productive cough, dyspnea and also found to have Herpes zoster on antiviral. Started Methylprednisolone 20 mg IV X3 with rebound hyperglycemia. Endocrine consult for management of hyperglycemia.     1. T2DM with hyperglycemia   - Test BGs ACTID, hs and 2am OR q 6 hours if NPO  - Increase lantus dose to 32 units at hs   -c/w admelog dose to 20-18-16 units TID pre meals HOLD if NPO/skip meals OR eat less than 50 % of meals  -Continue moderate ademlog correction scale ACTID and moderate correction scale at HS and 2am   -Please f/u Hypoglycemia protocol and document treatment  -Please notify endocrine if changes to methylprednisolone doses as insulin doses will also need to be changed accordingly   -Please add Admelog 4 units with bedtime snack for now. HOLD IF NOT EATING    Discharge:  Will be determined according to BG/insulin needs/PO intake and steroid therapy at time of discharge.  Plan to d/c on basal bolus. Doses TBD  Outpt. endo follow-up. Dr Saavedra  Outpt. optho, podiatry, micro/cr  Make sure pt has Rx for all DM supplies and insulin/ DM meds. Consider [employer] Luis E 3 if going home. Need to place keshia on phone to make sure CGM is compatible. Pt aware> will have daughter do it.      2. Hypertension.   ·  Plan: Goal BP <130/80   Manage per primary team.    3. Hyperlipidemia.   ·  Plan: LDL goal <70 due to DM  Pt LDL N/A  Order fasting lipid if not recently done  Statin as noted above   Manage per primary team   F/u levels as out pt.

## 2023-09-22 LAB
ANION GAP SERPL CALC-SCNC: 10 MMOL/L — SIGNIFICANT CHANGE UP (ref 5–17)
APTT BLD: 33.5 SEC — SIGNIFICANT CHANGE UP (ref 24.5–35.6)
BUN SERPL-MCNC: 23 MG/DL — SIGNIFICANT CHANGE UP (ref 7–23)
CALCIUM SERPL-MCNC: 9.1 MG/DL — SIGNIFICANT CHANGE UP (ref 8.4–10.5)
CHLORIDE SERPL-SCNC: 106 MMOL/L — SIGNIFICANT CHANGE UP (ref 96–108)
CO2 SERPL-SCNC: 26 MMOL/L — SIGNIFICANT CHANGE UP (ref 22–31)
CREAT SERPL-MCNC: 0.58 MG/DL — SIGNIFICANT CHANGE UP (ref 0.5–1.3)
EGFR: 95 ML/MIN/1.73M2 — SIGNIFICANT CHANGE UP
GLUCOSE BLDC GLUCOMTR-MCNC: 198 MG/DL — HIGH (ref 70–99)
GLUCOSE BLDC GLUCOMTR-MCNC: 285 MG/DL — HIGH (ref 70–99)
GLUCOSE BLDC GLUCOMTR-MCNC: 303 MG/DL — HIGH (ref 70–99)
GLUCOSE BLDC GLUCOMTR-MCNC: 334 MG/DL — HIGH (ref 70–99)
GLUCOSE BLDC GLUCOMTR-MCNC: 78 MG/DL — SIGNIFICANT CHANGE UP (ref 70–99)
GLUCOSE SERPL-MCNC: 264 MG/DL — HIGH (ref 70–99)
HCT VFR BLD CALC: 32.1 % — LOW (ref 34.5–45)
HGB BLD-MCNC: 9.4 G/DL — LOW (ref 11.5–15.5)
INR BLD: 3.09 RATIO — HIGH (ref 0.85–1.18)
MCHC RBC-ENTMCNC: 22.7 PG — LOW (ref 27–34)
MCHC RBC-ENTMCNC: 29.3 GM/DL — LOW (ref 32–36)
MCV RBC AUTO: 77.3 FL — LOW (ref 80–100)
NRBC # BLD: 1 /100 WBCS — HIGH (ref 0–0)
PLATELET # BLD AUTO: 356 K/UL — SIGNIFICANT CHANGE UP (ref 150–400)
POTASSIUM SERPL-MCNC: 4.5 MMOL/L — SIGNIFICANT CHANGE UP (ref 3.5–5.3)
POTASSIUM SERPL-SCNC: 4.5 MMOL/L — SIGNIFICANT CHANGE UP (ref 3.5–5.3)
PROTHROM AB SERPL-ACNC: 32.9 SEC — HIGH (ref 9.5–13)
RBC # BLD: 4.15 M/UL — SIGNIFICANT CHANGE UP (ref 3.8–5.2)
RBC # FLD: 25 % — HIGH (ref 10.3–14.5)
SODIUM SERPL-SCNC: 142 MMOL/L — SIGNIFICANT CHANGE UP (ref 135–145)
WBC # BLD: 14.9 K/UL — HIGH (ref 3.8–10.5)
WBC # FLD AUTO: 14.9 K/UL — HIGH (ref 3.8–10.5)

## 2023-09-22 PROCEDURE — 99232 SBSQ HOSP IP/OBS MODERATE 35: CPT

## 2023-09-22 RX ORDER — INSULIN GLARGINE 100 [IU]/ML
34 INJECTION, SOLUTION SUBCUTANEOUS AT BEDTIME
Refills: 0 | Status: DISCONTINUED | OUTPATIENT
Start: 2023-09-22 | End: 2023-09-23

## 2023-09-22 RX ORDER — TRAMADOL HYDROCHLORIDE 50 MG/1
50 TABLET ORAL EVERY 8 HOURS
Refills: 0 | Status: DISCONTINUED | OUTPATIENT
Start: 2023-09-22 | End: 2023-09-25

## 2023-09-22 RX ORDER — INSULIN LISPRO 100/ML
8 VIAL (ML) SUBCUTANEOUS ONCE
Refills: 0 | Status: DISCONTINUED | OUTPATIENT
Start: 2023-09-22 | End: 2023-09-25

## 2023-09-22 RX ORDER — INSULIN LISPRO 100/ML
24 VIAL (ML) SUBCUTANEOUS
Refills: 0 | Status: DISCONTINUED | OUTPATIENT
Start: 2023-09-23 | End: 2023-09-23

## 2023-09-22 RX ADMIN — GABAPENTIN 100 MILLIGRAM(S): 400 CAPSULE ORAL at 15:27

## 2023-09-22 RX ADMIN — Medication 4: at 21:53

## 2023-09-22 RX ADMIN — PANTOPRAZOLE SODIUM 40 MILLIGRAM(S): 20 TABLET, DELAYED RELEASE ORAL at 12:19

## 2023-09-22 RX ADMIN — MEXILETINE HYDROCHLORIDE 200 MILLIGRAM(S): 150 CAPSULE ORAL at 06:04

## 2023-09-22 RX ADMIN — Medication 20 MILLIGRAM(S): at 06:05

## 2023-09-22 RX ADMIN — Medication 600 MILLIGRAM(S): at 18:13

## 2023-09-22 RX ADMIN — Medication 3 MILLILITER(S): at 23:02

## 2023-09-22 RX ADMIN — SENNA PLUS 2 TABLET(S): 8.6 TABLET ORAL at 21:52

## 2023-09-22 RX ADMIN — Medication 4 UNIT(S): at 21:54

## 2023-09-22 RX ADMIN — Medication 1 APPLICATION(S): at 08:28

## 2023-09-22 RX ADMIN — Medication 1 TABLET(S): at 12:20

## 2023-09-22 RX ADMIN — Medication 20 UNIT(S): at 08:21

## 2023-09-22 RX ADMIN — Medication 2 MILLILITER(S): at 08:22

## 2023-09-22 RX ADMIN — GABAPENTIN 100 MILLIGRAM(S): 400 CAPSULE ORAL at 06:04

## 2023-09-22 RX ADMIN — Medication 6: at 08:21

## 2023-09-22 RX ADMIN — TRAMADOL HYDROCHLORIDE 50 MILLIGRAM(S): 50 TABLET ORAL at 15:31

## 2023-09-22 RX ADMIN — Medication 600 MILLIGRAM(S): at 06:04

## 2023-09-22 RX ADMIN — CLOPIDOGREL BISULFATE 75 MILLIGRAM(S): 75 TABLET, FILM COATED ORAL at 12:19

## 2023-09-22 RX ADMIN — GABAPENTIN 100 MILLIGRAM(S): 400 CAPSULE ORAL at 21:52

## 2023-09-22 RX ADMIN — Medication 20 MILLIGRAM(S): at 15:28

## 2023-09-22 RX ADMIN — TRAMADOL HYDROCHLORIDE 50 MILLIGRAM(S): 50 TABLET ORAL at 00:25

## 2023-09-22 RX ADMIN — Medication 3 MILLILITER(S): at 08:22

## 2023-09-22 RX ADMIN — Medication 8: at 12:18

## 2023-09-22 RX ADMIN — Medication 2 MILLILITER(S): at 21:55

## 2023-09-22 RX ADMIN — INSULIN GLARGINE 34 UNIT(S): 100 INJECTION, SOLUTION SUBCUTANEOUS at 21:51

## 2023-09-22 RX ADMIN — MEXILETINE HYDROCHLORIDE 200 MILLIGRAM(S): 150 CAPSULE ORAL at 15:28

## 2023-09-22 RX ADMIN — Medication 20 MILLIGRAM(S): at 21:54

## 2023-09-22 RX ADMIN — CHLORHEXIDINE GLUCONATE 1 APPLICATION(S): 213 SOLUTION TOPICAL at 06:04

## 2023-09-22 RX ADMIN — ATORVASTATIN CALCIUM 20 MILLIGRAM(S): 80 TABLET, FILM COATED ORAL at 21:53

## 2023-09-22 RX ADMIN — Medication 1 APPLICATION(S): at 18:10

## 2023-09-22 RX ADMIN — TRAMADOL HYDROCHLORIDE 50 MILLIGRAM(S): 50 TABLET ORAL at 23:02

## 2023-09-22 RX ADMIN — Medication 18 UNIT(S): at 12:18

## 2023-09-22 RX ADMIN — MEXILETINE HYDROCHLORIDE 200 MILLIGRAM(S): 150 CAPSULE ORAL at 21:52

## 2023-09-22 RX ADMIN — ERTAPENEM SODIUM 120 MILLIGRAM(S): 1 INJECTION, POWDER, LYOPHILIZED, FOR SOLUTION INTRAMUSCULAR; INTRAVENOUS at 23:01

## 2023-09-22 RX ADMIN — Medication 200 MILLIGRAM(S): at 12:19

## 2023-09-22 RX ADMIN — Medication 3 MILLILITER(S): at 18:10

## 2023-09-22 RX ADMIN — Medication 100 MILLIGRAM(S): at 21:53

## 2023-09-22 RX ADMIN — BUDESONIDE AND FORMOTEROL FUMARATE DIHYDRATE 2 PUFF(S): 160; 4.5 AEROSOL RESPIRATORY (INHALATION) at 08:22

## 2023-09-22 RX ADMIN — Medication 200 MILLIGRAM(S): at 06:05

## 2023-09-22 RX ADMIN — POLYETHYLENE GLYCOL 3350 17 GRAM(S): 17 POWDER, FOR SOLUTION ORAL at 08:22

## 2023-09-22 RX ADMIN — Medication 100 MILLIGRAM(S): at 15:28

## 2023-09-22 RX ADMIN — Medication 100 MILLIGRAM(S): at 06:04

## 2023-09-22 RX ADMIN — Medication 200 MILLIGRAM(S): at 23:02

## 2023-09-22 RX ADMIN — BUDESONIDE AND FORMOTEROL FUMARATE DIHYDRATE 2 PUFF(S): 160; 4.5 AEROSOL RESPIRATORY (INHALATION) at 18:12

## 2023-09-22 RX ADMIN — Medication 50 MILLIGRAM(S): at 21:51

## 2023-09-22 RX ADMIN — Medication 200 MILLIGRAM(S): at 18:12

## 2023-09-22 RX ADMIN — Medication 500 MILLIGRAM(S): at 12:19

## 2023-09-22 NOTE — PROGRESS NOTE ADULT - SUBJECTIVE AND OBJECTIVE BOX
DATE OF SERVICE: 09-22-23 @ 13:51    Patient is a 72y old  Female who presents with a chief complaint of sob (21 Sep 2023 12:48)      SUBJECTIVE / OVERNIGHT EVENTS:  doing much better, not coughing any more    MEDICATIONS  (STANDING):  acetylcysteine 10%  Inhalation 2 milliLiter(s) Inhalation every 8 hours  albuterol/ipratropium for Nebulization 3 milliLiter(s) Nebulizer every 6 hours  ascorbic acid 500 milliGRAM(s) Oral daily  atorvastatin 20 milliGRAM(s) Oral at bedtime  benzonatate 100 milliGRAM(s) Oral three times a day  budesonide  80 MICROgram(s)/formoterol 4.5 MICROgram(s) Inhaler 2 Puff(s) Inhalation two times a day  chlorhexidine 2% Cloths 1 Application(s) Topical <User Schedule>  clopidogrel Tablet 75 milliGRAM(s) Oral daily  diphenhydrAMINE Injectable 50 milliGRAM(s) IV Push daily  ertapenem  IVPB 1000 milliGRAM(s) IV Intermittent every 24 hours  gabapentin 100 milliGRAM(s) Oral every 8 hours  guaiFENesin  milliGRAM(s) Oral every 12 hours  guaiFENesin Oral Liquid (Sugar-Free) 200 milliGRAM(s) Oral every 6 hours  hydrocortisone 1% Cream 1 Application(s) Topical two times a day  insulin glargine Injectable (LANTUS) 32 Unit(s) SubCutaneous at bedtime  insulin lispro (ADMELOG) corrective regimen sliding scale   SubCutaneous three times a day before meals  insulin lispro (ADMELOG) corrective regimen sliding scale   SubCutaneous <User Schedule>  insulin lispro Injectable (ADMELOG) 18 Unit(s) SubCutaneous before lunch  insulin lispro Injectable (ADMELOG) 16 Unit(s) SubCutaneous before dinner  insulin lispro Injectable (ADMELOG) 4 Unit(s) SubCutaneous at bedtime  insulin lispro Injectable (ADMELOG) 20 Unit(s) SubCutaneous before breakfast  lidocaine   4% Patch 2 Patch Transdermal daily  methylPREDNISolone sodium succinate Injectable 20 milliGRAM(s) IV Push every 8 hours  mexiletine 200 milliGRAM(s) Oral every 8 hours  multivitamin 1 Tablet(s) Oral daily  pantoprazole    Tablet 40 milliGRAM(s) Oral daily  polyethylene glycol 3350 17 Gram(s) Oral daily  polyethylene glycol 3350 17 Gram(s) Oral two times a day  senna 2 Tablet(s) Oral at bedtime  tiotropium 2.5 MICROgram(s) Inhaler 2 Puff(s) Inhalation daily    MEDICATIONS  (PRN):  acetaminophen     Tablet .. 650 milliGRAM(s) Oral every 6 hours PRN Temp greater or equal to 38C (100.4F), Mild Pain (1 - 3)  diphenhydrAMINE 25 milliGRAM(s) Oral every 4 hours PRN Rash and/or Itching  simethicone 80 milliGRAM(s) Chew every 12 hours PRN Gas  traMADol 50 milliGRAM(s) Oral every 8 hours PRN Moderate Pain (4 - 6)      Vital Signs Last 24 Hrs  T(C): 36.6 (22 Sep 2023 11:31), Max: 37.2 (21 Sep 2023 20:22)  T(F): 97.9 (22 Sep 2023 11:31), Max: 99 (21 Sep 2023 20:22)  HR: 73 (22 Sep 2023 11:31) (73 - 86)  BP: 122/72 (22 Sep 2023 11:31) (119/71 - 125/73)  BP(mean): --  RR: 18 (22 Sep 2023 11:31) (18 - 18)  SpO2: 98% (22 Sep 2023 11:31) (98% - 100%)    Parameters below as of 22 Sep 2023 11:31  Patient On (Oxygen Delivery Method): room air      CAPILLARY BLOOD GLUCOSE      POCT Blood Glucose.: 303 mg/dL (22 Sep 2023 11:55)  POCT Blood Glucose.: 285 mg/dL (22 Sep 2023 08:19)  POCT Blood Glucose.: 198 mg/dL (22 Sep 2023 02:06)  POCT Blood Glucose.: 198 mg/dL (21 Sep 2023 21:17)  POCT Blood Glucose.: 233 mg/dL (21 Sep 2023 17:31)    I&O's Summary    21 Sep 2023 07:01  -  22 Sep 2023 07:00  --------------------------------------------------------  IN: 480 mL / OUT: 0 mL / NET: 480 mL    22 Sep 2023 07:01  -  22 Sep 2023 13:51  --------------------------------------------------------  IN: 240 mL / OUT: 0 mL / NET: 240 mL        PHYSICAL EXAM:  GENERAL: NAD, well-developed  HEAD:  Atraumatic, Normocephalic  EYES: EOMI, PERRLA, conjunctiva and sclera clear  NECK: Supple, No JVD  CHEST/LUNG: Clear to auscultation bilaterally; No wheeze  HEART: Regular rate and rhythm; No murmurs, rubs, or gallops  ABDOMEN: Soft, Nontender, Nondistended; Bowel sounds present  EXTREMITIES:  2+ Peripheral Pulses, No clubbing, cyanosis, or edema  PSYCH: AAOx3  NEUROLOGY: non-focal  SKIN: No rashes or lesions    LABS:                        9.4    14.90 )-----------( 356      ( 22 Sep 2023 05:15 )             32.1     09-22    142  |  106  |  23  ----------------------------<  264<H>  4.5   |  26  |  0.58    Ca    9.1      22 Sep 2023 05:15      PT/INR - ( 22 Sep 2023 05:18 )   PT: 32.9 sec;   INR: 3.09 ratio         PTT - ( 22 Sep 2023 05:18 )  PTT:33.5 sec      Urinalysis Basic - ( 22 Sep 2023 05:15 )    Color: x / Appearance: x / SG: x / pH: x  Gluc: 264 mg/dL / Ketone: x  / Bili: x / Urobili: x   Blood: x / Protein: x / Nitrite: x   Leuk Esterase: x / RBC: x / WBC x   Sq Epi: x / Non Sq Epi: x / Bacteria: x        RADIOLOGY & ADDITIONAL TESTS:    Imaging Personally Reviewed:    Consultant(s) Notes Reviewed:      Care Discussed with Consultants/Other Providers:

## 2023-09-22 NOTE — PROGRESS NOTE ADULT - ASSESSMENT
72 F w/h/o uncontrolled T2DM (A1C 9.4%) on basal/bolus insulin PTA. Unknown DM complications. Also COPD, secondary adrenal Insufficiency on chronic steroids, colorectal cancer s/p resection (colostomy bag), Chronic A fib on Eliquis, and tracheomalacia and multiple intubations, type A aortic dissection s/p aortic dissection repair on 9/07/22, sepsis. Pt well known to this provider from prior admissions. Pt recently admitted with SOB and found to have anemia and severe aortic stenosis>requiring valve in valve TAVR 9/7/23 discharged 9/10/23. Back with increasing productive cough, dyspnea and also found to have Herpes zoster on antiviral. Started Methylprednisolone 20 mg IV q8h with rebound hyperglycemia. Endocrine consult for management of hyperglycemia. Tolerating POs with BG still above goal. Pt eating night time snack on and off but not taking extra insulin with it. Will continue to increase insulin to BG goal 100 to 180s. No hypoglycemia

## 2023-09-22 NOTE — PROGRESS NOTE ADULT - SUBJECTIVE AND OBJECTIVE BOX
DIABETES FOLLOW UP NOTE: Saw pt earlier today    Chief Complaint: Endocrine consult requested for management of T2DM    INTERVAL HX: Pt stable, reports tolerating POs. BG levels remain above goal while on Methylprednisolone 20mg q8to improve breathing> No hypoglycemia. On antibiotic for ESBL proteus.         Review of Systems:  General: As above  Cardiovascular: No chest pain, palpitations  Respiratory: No SOB, no cough  GI: No nausea, vomiting, abdominal pain  Endocrine: No polyuria, polydipsia or S&Sx of hypoglycemia    Allergies    tetanus toxoid (Short breath)  Dilaudid (Short breath)  codeine (Short breath)  iodine (Short breath; Swelling)  Avelox (Short breath; Pruritus)  shellfish (Anaphylaxis)  cefepime (Anaphylaxis)  aspirin (Short breath)  Valium (Short breath)  penicillin (Anaphylaxis)    Intolerances      MEDICATIONS:  atorvastatin 20 milliGRAM(s) Oral at bedtime  ertapenem  IVPB 1000 milliGRAM(s) IV Intermittent every 24 hours  insulin glargine Injectable (LANTUS) 32 Unit(s) SubCutaneous at bedtime  insulin lispro (ADMELOG) corrective regimen sliding scale   SubCutaneous <User Schedule>  insulin lispro (ADMELOG) corrective regimen sliding scale   SubCutaneous three times a day before meals  insulin lispro Injectable (ADMELOG) 18 Unit(s) SubCutaneous before lunch  insulin lispro Injectable (ADMELOG) 16 Unit(s) SubCutaneous before dinner  insulin lispro Injectable (ADMELOG) 4 Unit(s) SubCutaneous at bedtime  insulin lispro Injectable (ADMELOG) 20 Unit(s) SubCutaneous before breakfast  methylPREDNISolone sodium succinate Injectable 20 milliGRAM(s) IV Push every 8 hours      PHYSICAL EXAM:  VITALS: T(C): 36.6 (09-22-23 @ 11:31)  T(F): 97.9 (09-22-23 @ 11:31), Max: 99 (09-21-23 @ 20:22)  HR: 72 (09-22-23 @ 16:47) (72 - 86)  BP: 127/78 (09-22-23 @ 16:47) (119/71 - 127/78)  RR:  (18 - 18)  SpO2:  (98% - 100%)  Wt(kg): --  GENERAL: Female laying in bed in NAD  Abdomen: Soft, nontender, non distended, central adiposity.   : Skin lesions in L groin and lower abdomen almost healed  Extremities: Warm, no edema in all 4 exts  NEURO: A&O X3      LABS:  POCT Blood Glucose.: 303 mg/dL (09-22-23 @ 11:55)  POCT Blood Glucose.: 285 mg/dL (09-22-23 @ 08:19)  POCT Blood Glucose.: 198 mg/dL (09-22-23 @ 02:06)  POCT Blood Glucose.: 198 mg/dL (09-21-23 @ 21:17)  POCT Blood Glucose.: 233 mg/dL (09-21-23 @ 17:31)  POCT Blood Glucose.: 389 mg/dL (09-21-23 @ 11:52)  POCT Blood Glucose.: 289 mg/dL (09-21-23 @ 08:55)  POCT Blood Glucose.: 309 mg/dL (09-21-23 @ 02:14)  POCT Blood Glucose.: 177 mg/dL (09-20-23 @ 21:12)  POCT Blood Glucose.: 126 mg/dL (09-20-23 @ 17:30)  POCT Blood Glucose.: 114 mg/dL (09-20-23 @ 11:53)  POCT Blood Glucose.: 263 mg/dL (09-20-23 @ 07:43)  POCT Blood Glucose.: 95 mg/dL (09-20-23 @ 02:10)  POCT Blood Glucose.: 166 mg/dL (09-19-23 @ 21:28)  POCT Blood Glucose.: 292 mg/dL (09-19-23 @ 18:36)                            9.4    14.90 )-----------( 356      ( 22 Sep 2023 05:15 )             32.1       09-22    142  |  106  |  23  ----------------------------<  264<H>  4.5   |  26  |  0.58    eGFR: 95    Ca    9.1      09-22    Thyroid Function Tests:  09-06 @ 09:32 TSH 0.49 FreeT4 1.2 T3 82 Anti TPO -- Anti Thyroglobulin Ab -- TSI --      A1C with Estimated Average Glucose Result: 5.7 % (09-08-23 @ 06:39)      Estimated Average Glucose: 117 mg/dL (09-08-23 @ 06:39)

## 2023-09-22 NOTE — CHART NOTE - NSCHARTNOTEFT_GEN_A_CORE
Medicine PA Note    Informed by RN that blood sugar is 78 before dinner this evening. Pt asymptomatic, denying dizziness, visual changes, nausea, and vomiting.    POCT Blood Glucose:  78 mg/dL (09-22-23 @ 17:17)  303 mg/dL (09-22-23 @ 11:55)  285 mg/dL (09-22-23 @ 08:19)  198 mg/dL (09-22-23 @ 02:06)  198 mg/dL (09-21-23 @ 21:17)    In discussion with endocrine, ordered 8 U of Admelog, instead of full 16 U of premeal  Pt eating dinner and VSS    JOE BhattC  [id number]

## 2023-09-22 NOTE — PROGRESS NOTE ADULT - ASSESSMENT
72 y.o. F with PMH of asthma on chronic steroids, bronchiectasis, allergic rhinitis,  recurrent PNA,  tracheomalacia s/p tracheoplasty, ESBL proteus infections (sputum),  diabetes, paroxysmal A-fib on coumadin, colorectal cancer many years ago status post colostomy, recent hospitalization at an outside hospital  for acute respiratory failure with hypoxia secondary to asthma/COPD exacerbation and bronchopneumonia 6/5/2023 - 6/16/2023), and AVR 2022 with Dr. Cabrera.  Pt admits to chronic SOB and cough for years and is up to date on vaccines. Dr. Allison follows as an outpatient for her COPD. s/p 9/6 TAVR- [facility] with Dr. Gonsalves and Dr. Leahy. discharged home 9/10. Pt presents to office today for f/u visit  w/ c/o of worsening SOB. Pt to be admitted for further workup and eval. Pt stable at this time. VSS; O2 sats 97% RA.  ,H/o  ,bronchiectasis, tracheomalacia s/p tracheoplasty who presents after a recent admission for bronchiectasis exacerbation (6/2023, treated with Ertapenem for ESBL proteus), and again in 9/2023 for an acute flare whose hospital course was complicated by identification of severe AS requiring valve in valve TAVR 9/7/23    bronchiectasis  9/13 Pulm eval /rec Routine sputum culture, urine strep/legionella, RVP, COVID  3 sputum AFB   Considering culture history would start ertapenem and vancomycin for MRSA   Bronchodilator: Standing duonebs q 4 hours, nebulized pulmicort  -  iv steroids solumedrol 20 iv q 8  - vest therapy  - mucous for culture and sensitivity  - nebulized mucomyst  - Ensure she is getting twice per day airway clearance: Albuterol nebulizer followed by saline nebulizer followed by cough assist device and encouragement to cough and clear  - Pulmonary will continue to follow  - steroid taper as per pulm  - ertapenam per ID    cough  - robitussin  - tessalon    S/P TAVR (transcatheter aortic valve replacement).   ·  Plan: Structural Team Following   Continue with Plavix 75 mg PO daily   Continue with Mexiletine 200 mg every 8 hours   Continue with Atorvastatin 20 mg PO HS    Daily EKG   Increase activity as tolerated.   Encourage Chest PT / Pulmonary toileting and Incentive spirometry every 1 hour x 10 while awake.   Continue with PUD and DVT prophylaxis.   Daily Shower     shingles  - valcyte for 7 days      Uncontrolled type 2 diabetes mellitus with hyperglycemia.   ·  Plan: Test BG ac and hs and 2am if eating. Q6H while NPO  -restart Lantus 32 units sq hs   - restart  Admelog 15-16-18 units qac   Hold if NPO or eating less than 50% of meals.  -  moderate correction scale ac meals and moderate correction hs and 2am.      Plan to d/c on basal bolus.   Outpt. endo follow-up. Dr Saavedra  Outpt. optho, podiatry, micro/cr  Consider Freesytle Luis E 3  upon discharge.    Essential hypertension.   ·  Plan: Goal BP <130/80   - cont current meds     Hyperlipidemia.   ·  Plan: LDL goal <70 due to DM  Pt LDL N/A  Order fasting lipid if not recently done  Statin as noted above     F/u levels as out pt.    H/O adrenal insufficiency.   ·  Plan: On chronic steroids at home      Atrial fibrillation.   ·  Plan: Continue with Mexiletine 200 mg every 8 hours   Daily PT/ INR and dose coumadin    dispo  - d/c planning home with home PT

## 2023-09-23 LAB
ANION GAP SERPL CALC-SCNC: 14 MMOL/L — SIGNIFICANT CHANGE UP (ref 5–17)
APTT BLD: 27.7 SEC — SIGNIFICANT CHANGE UP (ref 24.5–35.6)
BUN SERPL-MCNC: 26 MG/DL — HIGH (ref 7–23)
CALCIUM SERPL-MCNC: 8.6 MG/DL — SIGNIFICANT CHANGE UP (ref 8.4–10.5)
CHLORIDE SERPL-SCNC: 104 MMOL/L — SIGNIFICANT CHANGE UP (ref 96–108)
CO2 SERPL-SCNC: 22 MMOL/L — SIGNIFICANT CHANGE UP (ref 22–31)
CREAT SERPL-MCNC: 0.57 MG/DL — SIGNIFICANT CHANGE UP (ref 0.5–1.3)
EGFR: 95 ML/MIN/1.73M2 — SIGNIFICANT CHANGE UP
GLUCOSE BLDC GLUCOMTR-MCNC: 160 MG/DL — HIGH (ref 70–99)
GLUCOSE BLDC GLUCOMTR-MCNC: 228 MG/DL — HIGH (ref 70–99)
GLUCOSE BLDC GLUCOMTR-MCNC: 245 MG/DL — HIGH (ref 70–99)
GLUCOSE BLDC GLUCOMTR-MCNC: 313 MG/DL — HIGH (ref 70–99)
GLUCOSE BLDC GLUCOMTR-MCNC: 90 MG/DL — SIGNIFICANT CHANGE UP (ref 70–99)
GLUCOSE SERPL-MCNC: 323 MG/DL — HIGH (ref 70–99)
HCT VFR BLD CALC: 35 % — SIGNIFICANT CHANGE UP (ref 34.5–45)
HGB BLD-MCNC: 10.2 G/DL — LOW (ref 11.5–15.5)
INR BLD: 1.38 RATIO — HIGH (ref 0.85–1.18)
MCHC RBC-ENTMCNC: 22.7 PG — LOW (ref 27–34)
MCHC RBC-ENTMCNC: 29.1 GM/DL — LOW (ref 32–36)
MCV RBC AUTO: 77.8 FL — LOW (ref 80–100)
NRBC # BLD: 2 /100 WBCS — HIGH (ref 0–0)
PLATELET # BLD AUTO: 434 K/UL — HIGH (ref 150–400)
POTASSIUM SERPL-MCNC: 4.6 MMOL/L — SIGNIFICANT CHANGE UP (ref 3.5–5.3)
POTASSIUM SERPL-SCNC: 4.6 MMOL/L — SIGNIFICANT CHANGE UP (ref 3.5–5.3)
PROTHROM AB SERPL-ACNC: 15 SEC — HIGH (ref 9.5–13)
RBC # BLD: 4.5 M/UL — SIGNIFICANT CHANGE UP (ref 3.8–5.2)
RBC # FLD: 25.1 % — HIGH (ref 10.3–14.5)
SODIUM SERPL-SCNC: 140 MMOL/L — SIGNIFICANT CHANGE UP (ref 135–145)
WBC # BLD: 14.95 K/UL — HIGH (ref 3.8–10.5)
WBC # FLD AUTO: 14.95 K/UL — HIGH (ref 3.8–10.5)

## 2023-09-23 RX ORDER — INSULIN LISPRO 100/ML
14 VIAL (ML) SUBCUTANEOUS
Refills: 0 | Status: DISCONTINUED | OUTPATIENT
Start: 2023-09-24 | End: 2023-09-26

## 2023-09-23 RX ORDER — INSULIN LISPRO 100/ML
28 VIAL (ML) SUBCUTANEOUS
Refills: 0 | Status: DISCONTINUED | OUTPATIENT
Start: 2023-09-24 | End: 2023-09-25

## 2023-09-23 RX ORDER — INSULIN GLARGINE 100 [IU]/ML
38 INJECTION, SOLUTION SUBCUTANEOUS AT BEDTIME
Refills: 0 | Status: DISCONTINUED | OUTPATIENT
Start: 2023-09-23 | End: 2023-09-24

## 2023-09-23 RX ORDER — WARFARIN SODIUM 2.5 MG/1
5 TABLET ORAL ONCE
Refills: 0 | Status: COMPLETED | OUTPATIENT
Start: 2023-09-23 | End: 2023-09-23

## 2023-09-23 RX ADMIN — Medication 50 MILLIGRAM(S): at 21:36

## 2023-09-23 RX ADMIN — INSULIN GLARGINE 38 UNIT(S): 100 INJECTION, SOLUTION SUBCUTANEOUS at 21:34

## 2023-09-23 RX ADMIN — SIMETHICONE 80 MILLIGRAM(S): 80 TABLET, CHEWABLE ORAL at 19:34

## 2023-09-23 RX ADMIN — Medication 2 MILLILITER(S): at 14:14

## 2023-09-23 RX ADMIN — CLOPIDOGREL BISULFATE 75 MILLIGRAM(S): 75 TABLET, FILM COATED ORAL at 11:33

## 2023-09-23 RX ADMIN — Medication 500 MILLIGRAM(S): at 11:31

## 2023-09-23 RX ADMIN — Medication 20 MILLIGRAM(S): at 21:37

## 2023-09-23 RX ADMIN — Medication 4: at 02:06

## 2023-09-23 RX ADMIN — Medication 3 MILLILITER(S): at 11:30

## 2023-09-23 RX ADMIN — Medication 20 MILLIGRAM(S): at 05:32

## 2023-09-23 RX ADMIN — CHLORHEXIDINE GLUCONATE 1 APPLICATION(S): 213 SOLUTION TOPICAL at 05:31

## 2023-09-23 RX ADMIN — Medication 100 MILLIGRAM(S): at 14:14

## 2023-09-23 RX ADMIN — TRAMADOL HYDROCHLORIDE 50 MILLIGRAM(S): 50 TABLET ORAL at 00:02

## 2023-09-23 RX ADMIN — Medication 4: at 10:01

## 2023-09-23 RX ADMIN — PANTOPRAZOLE SODIUM 40 MILLIGRAM(S): 20 TABLET, DELAYED RELEASE ORAL at 11:31

## 2023-09-23 RX ADMIN — MEXILETINE HYDROCHLORIDE 200 MILLIGRAM(S): 150 CAPSULE ORAL at 21:35

## 2023-09-23 RX ADMIN — POLYETHYLENE GLYCOL 3350 17 GRAM(S): 17 POWDER, FOR SOLUTION ORAL at 17:57

## 2023-09-23 RX ADMIN — Medication 200 MILLIGRAM(S): at 17:28

## 2023-09-23 RX ADMIN — Medication 200 MILLIGRAM(S): at 23:22

## 2023-09-23 RX ADMIN — Medication 3 MILLILITER(S): at 17:57

## 2023-09-23 RX ADMIN — GABAPENTIN 100 MILLIGRAM(S): 400 CAPSULE ORAL at 14:15

## 2023-09-23 RX ADMIN — GABAPENTIN 100 MILLIGRAM(S): 400 CAPSULE ORAL at 21:35

## 2023-09-23 RX ADMIN — ATORVASTATIN CALCIUM 20 MILLIGRAM(S): 80 TABLET, FILM COATED ORAL at 21:35

## 2023-09-23 RX ADMIN — Medication 1 TABLET(S): at 11:31

## 2023-09-23 RX ADMIN — TIOTROPIUM BROMIDE 2 PUFF(S): 18 CAPSULE ORAL; RESPIRATORY (INHALATION) at 11:30

## 2023-09-23 RX ADMIN — Medication 100 MILLIGRAM(S): at 05:30

## 2023-09-23 RX ADMIN — Medication 600 MILLIGRAM(S): at 05:30

## 2023-09-23 RX ADMIN — Medication 16 UNIT(S): at 17:28

## 2023-09-23 RX ADMIN — WARFARIN SODIUM 5 MILLIGRAM(S): 2.5 TABLET ORAL at 21:38

## 2023-09-23 RX ADMIN — Medication 100 MILLIGRAM(S): at 21:38

## 2023-09-23 RX ADMIN — MEXILETINE HYDROCHLORIDE 200 MILLIGRAM(S): 150 CAPSULE ORAL at 14:15

## 2023-09-23 RX ADMIN — ERTAPENEM SODIUM 120 MILLIGRAM(S): 1 INJECTION, POWDER, LYOPHILIZED, FOR SOLUTION INTRAMUSCULAR; INTRAVENOUS at 22:42

## 2023-09-23 RX ADMIN — Medication 4: at 12:53

## 2023-09-23 RX ADMIN — Medication 200 MILLIGRAM(S): at 11:30

## 2023-09-23 RX ADMIN — GABAPENTIN 100 MILLIGRAM(S): 400 CAPSULE ORAL at 05:31

## 2023-09-23 RX ADMIN — MEXILETINE HYDROCHLORIDE 200 MILLIGRAM(S): 150 CAPSULE ORAL at 05:32

## 2023-09-23 RX ADMIN — Medication 20 MILLIGRAM(S): at 14:16

## 2023-09-23 RX ADMIN — Medication 3 MILLILITER(S): at 23:22

## 2023-09-23 RX ADMIN — POLYETHYLENE GLYCOL 3350 17 GRAM(S): 17 POWDER, FOR SOLUTION ORAL at 11:30

## 2023-09-23 RX ADMIN — Medication 18 UNIT(S): at 12:54

## 2023-09-23 RX ADMIN — Medication 2 MILLILITER(S): at 21:37

## 2023-09-23 RX ADMIN — Medication 24 UNIT(S): at 10:02

## 2023-09-23 RX ADMIN — Medication 600 MILLIGRAM(S): at 17:28

## 2023-09-23 RX ADMIN — Medication 200 MILLIGRAM(S): at 05:30

## 2023-09-23 RX ADMIN — BUDESONIDE AND FORMOTEROL FUMARATE DIHYDRATE 2 PUFF(S): 160; 4.5 AEROSOL RESPIRATORY (INHALATION) at 17:58

## 2023-09-23 RX ADMIN — SENNA PLUS 2 TABLET(S): 8.6 TABLET ORAL at 21:38

## 2023-09-23 NOTE — PROGRESS NOTE ADULT - SUBJECTIVE AND OBJECTIVE BOX
DATE OF SERVICE: 09-23-23 @ 15:17    Patient is a 72y old  Female who presents with a chief complaint of sob (22 Sep 2023 17:00)      SUBJECTIVE / OVERNIGHT EVENTS:  No chest pain. No shortness of breath. No complaints. No events overnight.     MEDICATIONS  (STANDING):  acetylcysteine 10%  Inhalation 2 milliLiter(s) Inhalation every 8 hours  albuterol/ipratropium for Nebulization 3 milliLiter(s) Nebulizer every 6 hours  ascorbic acid 500 milliGRAM(s) Oral daily  atorvastatin 20 milliGRAM(s) Oral at bedtime  benzonatate 100 milliGRAM(s) Oral three times a day  budesonide  80 MICROgram(s)/formoterol 4.5 MICROgram(s) Inhaler 2 Puff(s) Inhalation two times a day  chlorhexidine 2% Cloths 1 Application(s) Topical <User Schedule>  clopidogrel Tablet 75 milliGRAM(s) Oral daily  diphenhydrAMINE Injectable 50 milliGRAM(s) IV Push daily  ertapenem  IVPB 1000 milliGRAM(s) IV Intermittent every 24 hours  gabapentin 100 milliGRAM(s) Oral every 8 hours  guaiFENesin  milliGRAM(s) Oral every 12 hours  guaiFENesin Oral Liquid (Sugar-Free) 200 milliGRAM(s) Oral every 6 hours  hydrocortisone 1% Cream 1 Application(s) Topical two times a day  insulin glargine Injectable (LANTUS) 34 Unit(s) SubCutaneous at bedtime  insulin lispro (ADMELOG) corrective regimen sliding scale   SubCutaneous three times a day before meals  insulin lispro (ADMELOG) corrective regimen sliding scale   SubCutaneous <User Schedule>  insulin lispro Injectable (ADMELOG) 18 Unit(s) SubCutaneous before lunch  insulin lispro Injectable (ADMELOG) 16 Unit(s) SubCutaneous before dinner  insulin lispro Injectable (ADMELOG) 4 Unit(s) SubCutaneous at bedtime  insulin lispro Injectable (ADMELOG) 24 Unit(s) SubCutaneous before breakfast  insulin lispro Injectable (ADMELOG). 8 Unit(s) SubCutaneous once  lidocaine   4% Patch 2 Patch Transdermal daily  methylPREDNISolone sodium succinate Injectable 20 milliGRAM(s) IV Push every 8 hours  mexiletine 200 milliGRAM(s) Oral every 8 hours  multivitamin 1 Tablet(s) Oral daily  pantoprazole    Tablet 40 milliGRAM(s) Oral daily  polyethylene glycol 3350 17 Gram(s) Oral two times a day  polyethylene glycol 3350 17 Gram(s) Oral daily  senna 2 Tablet(s) Oral at bedtime  tiotropium 2.5 MICROgram(s) Inhaler 2 Puff(s) Inhalation daily    MEDICATIONS  (PRN):  acetaminophen     Tablet .. 650 milliGRAM(s) Oral every 6 hours PRN Temp greater or equal to 38C (100.4F), Mild Pain (1 - 3)  diphenhydrAMINE 25 milliGRAM(s) Oral every 4 hours PRN Rash and/or Itching  simethicone 80 milliGRAM(s) Chew every 12 hours PRN Gas  traMADol 50 milliGRAM(s) Oral every 8 hours PRN Moderate Pain (4 - 6)      Vital Signs Last 24 Hrs  T(C): 36.7 (23 Sep 2023 11:46), Max: 36.9 (22 Sep 2023 20:03)  T(F): 98.1 (23 Sep 2023 11:46), Max: 98.4 (22 Sep 2023 20:03)  HR: 77 (23 Sep 2023 11:46) (72 - 80)  BP: 132/74 (23 Sep 2023 11:46) (110/67 - 133/72)  BP(mean): --  RR: 18 (23 Sep 2023 11:46) (18 - 18)  SpO2: 98% (23 Sep 2023 11:46) (96% - 98%)    Parameters below as of 23 Sep 2023 11:46  Patient On (Oxygen Delivery Method): room air      CAPILLARY BLOOD GLUCOSE      POCT Blood Glucose.: 228 mg/dL (23 Sep 2023 12:51)  POCT Blood Glucose.: 245 mg/dL (23 Sep 2023 09:54)  POCT Blood Glucose.: 313 mg/dL (23 Sep 2023 02:04)  POCT Blood Glucose.: 334 mg/dL (22 Sep 2023 21:05)  POCT Blood Glucose.: 78 mg/dL (22 Sep 2023 17:17)    I&O's Summary    22 Sep 2023 07:01  -  23 Sep 2023 07:00  --------------------------------------------------------  IN: 720 mL / OUT: 0 mL / NET: 720 mL    23 Sep 2023 07:01  -  23 Sep 2023 15:17  --------------------------------------------------------  IN: 480 mL / OUT: 2 mL / NET: 478 mL        PHYSICAL EXAM:  GENERAL: NAD, well-developed  HEAD:  Atraumatic, Normocephalic  EYES: EOMI, PERRLA, conjunctiva and sclera clear  NECK: Supple, No JVD  CHEST/LUNG: Clear to auscultation bilaterally; No wheeze  HEART: Regular rate and rhythm; No murmurs, rubs, or gallops  ABDOMEN: Soft, Nontender, Nondistended; Bowel sounds present  EXTREMITIES:  2+ Peripheral Pulses, No clubbing, cyanosis, or edema  PSYCH: AAOx3  NEUROLOGY: non-focal  SKIN: No rashes or lesions    LABS:                        10.2   14.95 )-----------( 434      ( 23 Sep 2023 11:25 )             35.0     09-23    140  |  104  |  26<H>  ----------------------------<  323<H>  4.6   |  22  |  0.57    Ca    8.6      23 Sep 2023 11:25      PT/INR - ( 23 Sep 2023 11:25 )   PT: 15.0 sec;   INR: 1.38 ratio         PTT - ( 23 Sep 2023 11:25 )  PTT:27.7 sec      Urinalysis Basic - ( 23 Sep 2023 11:25 )    Color: x / Appearance: x / SG: x / pH: x  Gluc: 323 mg/dL / Ketone: x  / Bili: x / Urobili: x   Blood: x / Protein: x / Nitrite: x   Leuk Esterase: x / RBC: x / WBC x   Sq Epi: x / Non Sq Epi: x / Bacteria: x        RADIOLOGY & ADDITIONAL TESTS:    Imaging Personally Reviewed:    Consultant(s) Notes Reviewed:      Care Discussed with Consultants/Other Providers:

## 2023-09-23 NOTE — PROGRESS NOTE ADULT - ASSESSMENT
72 y.o. F with PMH of asthma on chronic steroids, bronchiectasis, allergic rhinitis,  recurrent PNA,  tracheomalacia s/p tracheoplasty, ESBL proteus infections (sputum),  diabetes, paroxysmal A-fib on coumadin, colorectal cancer many years ago status post colostomy, recent hospitalization at an outside hospital  for acute respiratory failure with hypoxia secondary to asthma/COPD exacerbation and bronchopneumonia 6/5/2023 - 6/16/2023), and AVR 2022 with Dr. Cabrera.  Pt admits to chronic SOB and cough for years and is up to date on vaccines. Dr. Allison follows as an outpatient for her COPD. s/p 9/6 TAVR- [facility] with Dr. Gonsalves and Dr. Leahy. discharged home 9/10. Pt presents to office today for f/u visit  w/ c/o of worsening SOB. Pt to be admitted for further workup and eval. Pt stable at this time. VSS; O2 sats 97% RA.  ,H/o  ,bronchiectasis, tracheomalacia s/p tracheoplasty who presents after a recent admission for bronchiectasis exacerbation (6/2023, treated with Ertapenem for ESBL proteus), and again in 9/2023 for an acute flare whose hospital course was complicated by identification of severe AS requiring valve in valve TAVR 9/7/23    bronchiectasis  9/13 Pulm eval /rec Routine sputum culture, urine strep/legionella, RVP, COVID  Considering culture history would start ertapenem and vancomycin for MRSA   Bronchodilator: Standing duonebs q 4 hours, nebulized pulmicort  -  iv steroids solumedrol 20 iv q 8  - vest therapy  - mucous for culture and sensitivity  - nebulized mucomyst  - Ensure she is getting twice per day airway clearance: Albuterol nebulizer followed by saline nebulizer followed by cough assist device and encouragement to cough and clear  - Pulmonary will continue to follow  - steroid taper as per pulm  - ertapenam per ID    cough  - Robitussin  - tessalon    S/P TAVR (transcatheter aortic valve replacement).   ·  Plan: Structural Team Following   Continue with Plavix 75 mg PO daily   Continue with Mexiletine 200 mg every 8 hours   Continue with Atorvastatin 20 mg PO HS    Daily EKG   Increase activity as tolerated.   Encourage Chest PT / Pulmonary toileting and Incentive spirometry every 1 hour x 10 while awake.   Continue with PUD and DVT prophylaxis.   Daily Shower     shingles  - valcyte for 7 days      Uncontrolled type 2 diabetes mellitus with hyperglycemia.   ·  Plan: Test BG ac and hs and 2am if eating. Q6H while NPO  -restart Lantus 32 units sq hs   - restart  Admelog 15-16-18 units qac   Hold if NPO or eating less than 50% of meals.  -  moderate correction scale ac meals and moderate correction hs and 2am.      Plan to d/c on basal bolus.   Outpt. endo follow-up. Dr Saavedra  Outpt. optho, podiatry, micro/cr  Consider Freesytle Luis E 3  upon discharge.    Essential hypertension.   ·  Plan: Goal BP <130/80   - cont current meds     Hyperlipidemia.   ·  Plan: LDL goal <70 due to DM  Pt LDL N/A  Order fasting lipid if not recently done  Statin as noted above     F/u levels as out pt.    H/O adrenal insufficiency.   ·  Plan: On chronic steroids at home      Atrial fibrillation.   ·  Plan: Continue with Mexiletine 200 mg every 8 hours   Daily PT/ INR and dose coumadin    dispo  - d/c planning home with home PT

## 2023-09-23 NOTE — CHART NOTE - NSCHARTNOTEFT_GEN_A_CORE
Age: 72y    Gender: Female    POCT Blood Glucose:  90 mg/dL (09-23-23 @ 17:25)  228 mg/dL (09-23-23 @ 12:51)  245 mg/dL (09-23-23 @ 09:54)  313 mg/dL (09-23-23 @ 02:04)  334 mg/dL (09-22-23 @ 21:05)      eMAR:atorvastatin   20 milliGRAM(s) Oral (09-22-23 @ 21:53)    insulin glargine Injectable (LANTUS)   34 Unit(s) SubCutaneous (09-22-23 @ 21:51)    insulin lispro (ADMELOG) corrective regimen sliding scale   4 Unit(s) SubCutaneous (09-23-23 @ 02:06)   4 Unit(s) SubCutaneous (09-22-23 @ 21:53)    insulin lispro (ADMELOG) corrective regimen sliding scale   4 Unit(s) SubCutaneous (09-23-23 @ 12:53)   4 Unit(s) SubCutaneous (09-23-23 @ 10:01)    insulin lispro Injectable (ADMELOG)   16 Unit(s) SubCutaneous (09-23-23 @ 17:28)    insulin lispro Injectable (ADMELOG)   4 Unit(s) SubCutaneous (09-22-23 @ 21:54)    insulin lispro Injectable (ADMELOG)   18 Unit(s) SubCutaneous (09-23-23 @ 12:54)    insulin lispro Injectable (ADMELOG)   24 Unit(s) SubCutaneous (09-23-23 @ 10:02)    methylPREDNISolone sodium succinate Injectable   20 milliGRAM(s) IV Push (09-23-23 @ 14:16)   20 milliGRAM(s) IV Push (09-23-23 @ 05:32)   20 milliGRAM(s) IV Push (09-22-23 @ 21:54)    2 F w/h/o uncontrolled T2DM (A1C 9.4%) on basal/bolus insulin PTA. Unknown DM complications. Also COPD, secondary adrenal Insufficiency on chronic steroids, colorectal cancer s/p resection (colostomy bag), Chronic A fib on Eliquis, and tracheomalacia and multiple intubations, type A aortic dissection s/p aortic dissection repair on 9/07/22, sepsis. Pt well known to this provider from prior admissions. Pt recently admitted with SOB and found to have anemia and severe aortic stenosis>requiring valve in valve TAVR 9/7/23 discharged 9/10/23. Back with increasing productive cough, dyspnea and also found to have Herpes zoster on antiviral. Started Methylprednisolone 20 mg IV q8h with rebound hyperglycemia. Endocrine consult for management of hyperglycemia. Tolerating POs with BG still above goal. Pre- dinner tightly controlled for 2 days  ( BG 78 and 90) and  pre- lunch hyperglycemia ( 200s-300s for 3 days ). Will adjust insulin doses for BG Goal 100-180mg/dl       # DM2 (diabetes mellitus, type 2).   - Test BGs ACTID, hs and 2am OR q 6 hours if NPO  -Change lantus dose to 38 units at hs for now  -Change admelog dose to 28-14-16 units TID pre meals HOLD if NPO/skip meals OR eat less than 50 % of meals  -C/w Admelog 4 units with snack at night, hold if not eating evening snacks  -Continue moderate ademlog correction scale ACTID and moderate correction scale at HS and 2am   -Please f/u Hypoglycemia protocol and document treatment  -Please notify endocrine if changes to methylprednisolone doses as insulin doses will also need to be changed accordingly   - Please keep hypoglycemia protocol in place     Pamela morejon NP-C   Contact via [employer] Teams during business hours  To reach covering provider access AMION via sunrise tools  For Urgent matters/after-hours/weekends/holidays please page endocrine fellow on call   For nonurgent matters please email [email or web address]    Please note that this patient may be followed by different provider tomorrow.  Notify endocrine 24 hours prior to discharge for final recommendations

## 2023-09-24 LAB
ANION GAP SERPL CALC-SCNC: 13 MMOL/L — SIGNIFICANT CHANGE UP (ref 5–17)
BUN SERPL-MCNC: 26 MG/DL — HIGH (ref 7–23)
CALCIUM SERPL-MCNC: 8.8 MG/DL — SIGNIFICANT CHANGE UP (ref 8.4–10.5)
CHLORIDE SERPL-SCNC: 103 MMOL/L — SIGNIFICANT CHANGE UP (ref 96–108)
CO2 SERPL-SCNC: 24 MMOL/L — SIGNIFICANT CHANGE UP (ref 22–31)
CREAT SERPL-MCNC: 0.58 MG/DL — SIGNIFICANT CHANGE UP (ref 0.5–1.3)
EGFR: 95 ML/MIN/1.73M2 — SIGNIFICANT CHANGE UP
GLUCOSE BLDC GLUCOMTR-MCNC: 129 MG/DL — HIGH (ref 70–99)
GLUCOSE BLDC GLUCOMTR-MCNC: 146 MG/DL — HIGH (ref 70–99)
GLUCOSE BLDC GLUCOMTR-MCNC: 201 MG/DL — HIGH (ref 70–99)
GLUCOSE BLDC GLUCOMTR-MCNC: 207 MG/DL — HIGH (ref 70–99)
GLUCOSE BLDC GLUCOMTR-MCNC: 256 MG/DL — HIGH (ref 70–99)
GLUCOSE SERPL-MCNC: 234 MG/DL — HIGH (ref 70–99)
HCT VFR BLD CALC: 34.3 % — LOW (ref 34.5–45)
HGB BLD-MCNC: 9.9 G/DL — LOW (ref 11.5–15.5)
INR BLD: 1.07 RATIO — SIGNIFICANT CHANGE UP (ref 0.85–1.18)
MCHC RBC-ENTMCNC: 22.7 PG — LOW (ref 27–34)
MCHC RBC-ENTMCNC: 28.9 GM/DL — LOW (ref 32–36)
MCV RBC AUTO: 78.7 FL — LOW (ref 80–100)
NRBC # BLD: 2 /100 WBCS — HIGH (ref 0–0)
PLATELET # BLD AUTO: 408 K/UL — HIGH (ref 150–400)
POTASSIUM SERPL-MCNC: 5 MMOL/L — SIGNIFICANT CHANGE UP (ref 3.5–5.3)
POTASSIUM SERPL-SCNC: 5 MMOL/L — SIGNIFICANT CHANGE UP (ref 3.5–5.3)
PROTHROM AB SERPL-ACNC: 11.7 SEC — SIGNIFICANT CHANGE UP (ref 9.5–13)
RBC # BLD: 4.36 M/UL — SIGNIFICANT CHANGE UP (ref 3.8–5.2)
RBC # FLD: 25 % — HIGH (ref 10.3–14.5)
SODIUM SERPL-SCNC: 140 MMOL/L — SIGNIFICANT CHANGE UP (ref 135–145)
WBC # BLD: 16.67 K/UL — HIGH (ref 3.8–10.5)
WBC # FLD AUTO: 16.67 K/UL — HIGH (ref 3.8–10.5)

## 2023-09-24 RX ORDER — SIMETHICONE 80 MG/1
80 TABLET, CHEWABLE ORAL ONCE
Refills: 0 | Status: COMPLETED | OUTPATIENT
Start: 2023-09-24 | End: 2023-09-24

## 2023-09-24 RX ORDER — INSULIN GLARGINE 100 [IU]/ML
44 INJECTION, SOLUTION SUBCUTANEOUS AT BEDTIME
Refills: 0 | Status: DISCONTINUED | OUTPATIENT
Start: 2023-09-24 | End: 2023-09-25

## 2023-09-24 RX ORDER — WARFARIN SODIUM 2.5 MG/1
6 TABLET ORAL ONCE
Refills: 0 | Status: COMPLETED | OUTPATIENT
Start: 2023-09-24 | End: 2023-09-24

## 2023-09-24 RX ADMIN — CLOPIDOGREL BISULFATE 75 MILLIGRAM(S): 75 TABLET, FILM COATED ORAL at 11:51

## 2023-09-24 RX ADMIN — Medication 6: at 08:56

## 2023-09-24 RX ADMIN — Medication 28 UNIT(S): at 08:57

## 2023-09-24 RX ADMIN — Medication 2 MILLILITER(S): at 21:28

## 2023-09-24 RX ADMIN — TRAMADOL HYDROCHLORIDE 50 MILLIGRAM(S): 50 TABLET ORAL at 22:53

## 2023-09-24 RX ADMIN — Medication 1 TABLET(S): at 11:50

## 2023-09-24 RX ADMIN — Medication 200 MILLIGRAM(S): at 05:12

## 2023-09-24 RX ADMIN — Medication 100 MILLIGRAM(S): at 21:29

## 2023-09-24 RX ADMIN — Medication 200 MILLIGRAM(S): at 11:49

## 2023-09-24 RX ADMIN — TRAMADOL HYDROCHLORIDE 50 MILLIGRAM(S): 50 TABLET ORAL at 23:33

## 2023-09-24 RX ADMIN — Medication 200 MILLIGRAM(S): at 17:20

## 2023-09-24 RX ADMIN — Medication 100 MILLIGRAM(S): at 13:32

## 2023-09-24 RX ADMIN — SIMETHICONE 80 MILLIGRAM(S): 80 TABLET, CHEWABLE ORAL at 17:30

## 2023-09-24 RX ADMIN — POLYETHYLENE GLYCOL 3350 17 GRAM(S): 17 POWDER, FOR SOLUTION ORAL at 17:21

## 2023-09-24 RX ADMIN — ERTAPENEM SODIUM 120 MILLIGRAM(S): 1 INJECTION, POWDER, LYOPHILIZED, FOR SOLUTION INTRAMUSCULAR; INTRAVENOUS at 21:29

## 2023-09-24 RX ADMIN — MEXILETINE HYDROCHLORIDE 200 MILLIGRAM(S): 150 CAPSULE ORAL at 21:28

## 2023-09-24 RX ADMIN — WARFARIN SODIUM 6 MILLIGRAM(S): 2.5 TABLET ORAL at 21:27

## 2023-09-24 RX ADMIN — Medication 20 MILLIGRAM(S): at 21:27

## 2023-09-24 RX ADMIN — GABAPENTIN 100 MILLIGRAM(S): 400 CAPSULE ORAL at 05:11

## 2023-09-24 RX ADMIN — Medication 14 UNIT(S): at 11:55

## 2023-09-24 RX ADMIN — Medication 20 MILLIGRAM(S): at 05:17

## 2023-09-24 RX ADMIN — MEXILETINE HYDROCHLORIDE 200 MILLIGRAM(S): 150 CAPSULE ORAL at 05:11

## 2023-09-24 RX ADMIN — SENNA PLUS 2 TABLET(S): 8.6 TABLET ORAL at 21:29

## 2023-09-24 RX ADMIN — Medication 2 MILLILITER(S): at 13:32

## 2023-09-24 RX ADMIN — Medication 100 MILLIGRAM(S): at 05:11

## 2023-09-24 RX ADMIN — Medication 500 MILLIGRAM(S): at 11:50

## 2023-09-24 RX ADMIN — GABAPENTIN 100 MILLIGRAM(S): 400 CAPSULE ORAL at 13:32

## 2023-09-24 RX ADMIN — ATORVASTATIN CALCIUM 20 MILLIGRAM(S): 80 TABLET, FILM COATED ORAL at 21:29

## 2023-09-24 RX ADMIN — GABAPENTIN 100 MILLIGRAM(S): 400 CAPSULE ORAL at 21:29

## 2023-09-24 RX ADMIN — MEXILETINE HYDROCHLORIDE 200 MILLIGRAM(S): 150 CAPSULE ORAL at 13:31

## 2023-09-24 RX ADMIN — Medication 16 UNIT(S): at 17:19

## 2023-09-24 RX ADMIN — CHLORHEXIDINE GLUCONATE 1 APPLICATION(S): 213 SOLUTION TOPICAL at 05:12

## 2023-09-24 RX ADMIN — PANTOPRAZOLE SODIUM 40 MILLIGRAM(S): 20 TABLET, DELAYED RELEASE ORAL at 11:51

## 2023-09-24 RX ADMIN — Medication 3 MILLILITER(S): at 17:20

## 2023-09-24 RX ADMIN — POLYETHYLENE GLYCOL 3350 17 GRAM(S): 17 POWDER, FOR SOLUTION ORAL at 08:57

## 2023-09-24 RX ADMIN — SIMETHICONE 80 MILLIGRAM(S): 80 TABLET, CHEWABLE ORAL at 09:19

## 2023-09-24 RX ADMIN — TIOTROPIUM BROMIDE 2 PUFF(S): 18 CAPSULE ORAL; RESPIRATORY (INHALATION) at 11:50

## 2023-09-24 RX ADMIN — Medication 3 MILLILITER(S): at 11:49

## 2023-09-24 RX ADMIN — Medication 600 MILLIGRAM(S): at 17:20

## 2023-09-24 RX ADMIN — Medication 20 MILLIGRAM(S): at 13:31

## 2023-09-24 RX ADMIN — INSULIN GLARGINE 44 UNIT(S): 100 INJECTION, SOLUTION SUBCUTANEOUS at 21:27

## 2023-09-24 RX ADMIN — Medication 50 MILLIGRAM(S): at 21:27

## 2023-09-24 RX ADMIN — Medication 600 MILLIGRAM(S): at 05:12

## 2023-09-24 RX ADMIN — BUDESONIDE AND FORMOTEROL FUMARATE DIHYDRATE 2 PUFF(S): 160; 4.5 AEROSOL RESPIRATORY (INHALATION) at 17:20

## 2023-09-24 RX ADMIN — Medication 4 UNIT(S): at 21:28

## 2023-09-24 NOTE — PROGRESS NOTE ADULT - SUBJECTIVE AND OBJECTIVE BOX
DATE OF SERVICE: 09-24-23 @ 14:20    Patient is a 72y old  Female who presents with a chief complaint of sob (23 Sep 2023 15:17)      SUBJECTIVE / OVERNIGHT EVENTS:  No chest pain. No shortness of breath. No complaints. No events overnight.     MEDICATIONS  (STANDING):  acetylcysteine 10%  Inhalation 2 milliLiter(s) Inhalation every 8 hours  albuterol/ipratropium for Nebulization 3 milliLiter(s) Nebulizer every 6 hours  ascorbic acid 500 milliGRAM(s) Oral daily  atorvastatin 20 milliGRAM(s) Oral at bedtime  benzonatate 100 milliGRAM(s) Oral three times a day  budesonide  80 MICROgram(s)/formoterol 4.5 MICROgram(s) Inhaler 2 Puff(s) Inhalation two times a day  chlorhexidine 2% Cloths 1 Application(s) Topical <User Schedule>  clopidogrel Tablet 75 milliGRAM(s) Oral daily  diphenhydrAMINE Injectable 50 milliGRAM(s) IV Push daily  ertapenem  IVPB 1000 milliGRAM(s) IV Intermittent every 24 hours  gabapentin 100 milliGRAM(s) Oral every 8 hours  guaiFENesin  milliGRAM(s) Oral every 12 hours  guaiFENesin Oral Liquid (Sugar-Free) 200 milliGRAM(s) Oral every 6 hours  hydrocortisone 1% Cream 1 Application(s) Topical two times a day  insulin glargine Injectable (LANTUS) 38 Unit(s) SubCutaneous at bedtime  insulin lispro (ADMELOG) corrective regimen sliding scale   SubCutaneous <User Schedule>  insulin lispro (ADMELOG) corrective regimen sliding scale   SubCutaneous three times a day before meals  insulin lispro Injectable (ADMELOG) 14 Unit(s) SubCutaneous before lunch  insulin lispro Injectable (ADMELOG) 16 Unit(s) SubCutaneous before dinner  insulin lispro Injectable (ADMELOG) 4 Unit(s) SubCutaneous at bedtime  insulin lispro Injectable (ADMELOG) 28 Unit(s) SubCutaneous before breakfast  insulin lispro Injectable (ADMELOG). 8 Unit(s) SubCutaneous once  lidocaine   4% Patch 2 Patch Transdermal daily  methylPREDNISolone sodium succinate Injectable 20 milliGRAM(s) IV Push every 8 hours  mexiletine 200 milliGRAM(s) Oral every 8 hours  multivitamin 1 Tablet(s) Oral daily  pantoprazole    Tablet 40 milliGRAM(s) Oral daily  polyethylene glycol 3350 17 Gram(s) Oral two times a day  senna 2 Tablet(s) Oral at bedtime  tiotropium 2.5 MICROgram(s) Inhaler 2 Puff(s) Inhalation daily  warfarin 6 milliGRAM(s) Oral once    MEDICATIONS  (PRN):  acetaminophen     Tablet .. 650 milliGRAM(s) Oral every 6 hours PRN Temp greater or equal to 38C (100.4F), Mild Pain (1 - 3)  diphenhydrAMINE 25 milliGRAM(s) Oral every 4 hours PRN Rash and/or Itching  simethicone 80 milliGRAM(s) Chew every 12 hours PRN Gas  traMADol 50 milliGRAM(s) Oral every 8 hours PRN Moderate Pain (4 - 6)      Vital Signs Last 24 Hrs  T(C): 36.5 (24 Sep 2023 11:56), Max: 37.1 (23 Sep 2023 20:31)  T(F): 97.7 (24 Sep 2023 11:56), Max: 98.7 (23 Sep 2023 20:31)  HR: 74 (24 Sep 2023 11:56) (57 - 78)  BP: 158/80 (24 Sep 2023 11:56) (105/62 - 158/80)  BP(mean): --  RR: 18 (24 Sep 2023 11:56) (17 - 18)  SpO2: 100% (24 Sep 2023 11:56) (95% - 100%)    Parameters below as of 24 Sep 2023 11:56  Patient On (Oxygen Delivery Method): room air      CAPILLARY BLOOD GLUCOSE      POCT Blood Glucose.: 129 mg/dL (24 Sep 2023 11:52)  POCT Blood Glucose.: 256 mg/dL (24 Sep 2023 08:41)  POCT Blood Glucose.: 207 mg/dL (24 Sep 2023 01:54)  POCT Blood Glucose.: 160 mg/dL (23 Sep 2023 20:54)  POCT Blood Glucose.: 90 mg/dL (23 Sep 2023 17:25)    I&O's Summary    23 Sep 2023 07:01  -  24 Sep 2023 07:00  --------------------------------------------------------  IN: 480 mL / OUT: 2 mL / NET: 478 mL    24 Sep 2023 07:01  -  24 Sep 2023 14:20  --------------------------------------------------------  IN: 480 mL / OUT: 0 mL / NET: 480 mL        PHYSICAL EXAM:  GENERAL: NAD, well-developed  HEAD:  Atraumatic, Normocephalic  EYES: EOMI, PERRLA, conjunctiva and sclera clear  NECK: Supple, No JVD  CHEST/LUNG: Clear to auscultation bilaterally; No wheeze  HEART: Regular rate and rhythm; No murmurs, rubs, or gallops  ABDOMEN: Soft, Nontender, Nondistended; Bowel sounds present  EXTREMITIES:  2+ Peripheral Pulses, No clubbing, cyanosis, or edema  PSYCH: AAOx3  NEUROLOGY: non-focal  SKIN: No rashes or lesions    LABS:                        9.9    16.67 )-----------( 408      ( 24 Sep 2023 06:55 )             34.3     09-24    140  |  103  |  26<H>  ----------------------------<  234<H>  5.0   |  24  |  0.58    Ca    8.8      24 Sep 2023 06:55      PT/INR - ( 24 Sep 2023 06:55 )   PT: 11.7 sec;   INR: 1.07 ratio         PTT - ( 23 Sep 2023 11:25 )  PTT:27.7 sec      Urinalysis Basic - ( 24 Sep 2023 06:55 )    Color: x / Appearance: x / SG: x / pH: x  Gluc: 234 mg/dL / Ketone: x  / Bili: x / Urobili: x   Blood: x / Protein: x / Nitrite: x   Leuk Esterase: x / RBC: x / WBC x   Sq Epi: x / Non Sq Epi: x / Bacteria: x        RADIOLOGY & ADDITIONAL TESTS:    Imaging Personally Reviewed:    Consultant(s) Notes Reviewed:      Care Discussed with Consultants/Other Providers:

## 2023-09-24 NOTE — CHART NOTE - NSCHARTNOTEFT_GEN_A_CORE
Age: 72y    Gender: Female    POCT Blood Glucose:  146 mg/dL (09-24-23 @ 17:06)  129 mg/dL (09-24-23 @ 11:52)  256 mg/dL (09-24-23 @ 08:41)  207 mg/dL (09-24-23 @ 01:54)  160 mg/dL (09-23-23 @ 20:54)      eMAR:atorvastatin   20 milliGRAM(s) Oral (09-23-23 @ 21:35)    insulin glargine Injectable (LANTUS)   38 Unit(s) SubCutaneous (09-23-23 @ 21:34)    insulin lispro (ADMELOG) corrective regimen sliding scale   6 Unit(s) SubCutaneous (09-24-23 @ 08:56)    insulin lispro Injectable (ADMELOG)   14 Unit(s) SubCutaneous (09-24-23 @ 11:55)    insulin lispro Injectable (ADMELOG)   16 Unit(s) SubCutaneous (09-24-23 @ 17:19)    insulin lispro Injectable (ADMELOG)   28 Unit(s) SubCutaneous (09-24-23 @ 08:57)    methylPREDNISolone sodium succinate Injectable   20 milliGRAM(s) IV Push (09-24-23 @ 13:31)   20 milliGRAM(s) IV Push (09-24-23 @ 05:17)   20 milliGRAM(s) IV Push (09-23-23 @ 21:37)    72 F w/h/o uncontrolled T2DM (A1C 9.4%) on basal/bolus insulin PTA. Unknown DM complications. Also COPD, secondary adrenal Insufficiency on chronic steroids, colorectal cancer s/p resection (colostomy bag), Chronic A fib on Eliquis, and tracheomalacia and multiple intubations, type A aortic dissection s/p aortic dissection repair on 9/07/22, sepsis. Pt well known to this provider from prior admissions. Pt recently admitted with SOB and found to have anemia and severe aortic stenosis>requiring valve in valve TAVR 9/7/23 discharged 9/10/23. Back with increasing productive cough, dyspnea and also found to have Herpes zoster on antiviral. Started Methylprednisolone 20 mg IV q8h with rebound hyperglycemia. Endocrine consult for management of hyperglycemia. Tolerating POs with BG still above goal. BGs overall improved however still having fasting hyperglycemia. Will adjust lantus dose for BG Goal 100-180mg/dl     # DM2 (diabetes mellitus, type 2).   - Test BGs ACTID, hs and 2am OR q 6 hours if NPO  -Change lantus dose to 44 units at hs for now  -Continue admelog dose to 28-14-16 units TID pre meals HOLD if NPO/skip meals OR eat less than 50 % of meals  -C/w Admelog 4 units with snack at night, hold if not eating evening snacks  -Continue moderate ademlog correction scale ACTID and moderate correction scale at HS and 2am   -Please f/u Hypoglycemia protocol and document treatment  -Please notify endocrine if changes to methylprednisolone doses as insulin doses will also need to be changed accordingly   - Please keep hypoglycemia protocol in place     Pamela morejon NP-C   Contact via [employer] Teams during business hours  To reach covering provider access AMION via sunrise tools  For Urgent matters/after-hours/weekends/holidays please page endocrine fellow on call   For nonurgent matters please email [email or web address]

## 2023-09-25 LAB
ANION GAP SERPL CALC-SCNC: 10 MMOL/L — SIGNIFICANT CHANGE UP (ref 5–17)
BUN SERPL-MCNC: 22 MG/DL — SIGNIFICANT CHANGE UP (ref 7–23)
CALCIUM SERPL-MCNC: 8.8 MG/DL — SIGNIFICANT CHANGE UP (ref 8.4–10.5)
CHLORIDE SERPL-SCNC: 105 MMOL/L — SIGNIFICANT CHANGE UP (ref 96–108)
CO2 SERPL-SCNC: 29 MMOL/L — SIGNIFICANT CHANGE UP (ref 22–31)
CREAT SERPL-MCNC: 0.68 MG/DL — SIGNIFICANT CHANGE UP (ref 0.5–1.3)
EGFR: 91 ML/MIN/1.73M2 — SIGNIFICANT CHANGE UP
GLUCOSE BLDC GLUCOMTR-MCNC: 128 MG/DL — HIGH (ref 70–99)
GLUCOSE BLDC GLUCOMTR-MCNC: 204 MG/DL — HIGH (ref 70–99)
GLUCOSE BLDC GLUCOMTR-MCNC: 204 MG/DL — HIGH (ref 70–99)
GLUCOSE BLDC GLUCOMTR-MCNC: 206 MG/DL — HIGH (ref 70–99)
GLUCOSE BLDC GLUCOMTR-MCNC: 93 MG/DL — SIGNIFICANT CHANGE UP (ref 70–99)
GLUCOSE SERPL-MCNC: 106 MG/DL — HIGH (ref 70–99)
HCT VFR BLD CALC: 33.8 % — LOW (ref 34.5–45)
HGB BLD-MCNC: 10.1 G/DL — LOW (ref 11.5–15.5)
INR BLD: 1.42 RATIO — HIGH (ref 0.85–1.18)
MCHC RBC-ENTMCNC: 23.4 PG — LOW (ref 27–34)
MCHC RBC-ENTMCNC: 29.9 GM/DL — LOW (ref 32–36)
MCV RBC AUTO: 78.2 FL — LOW (ref 80–100)
NRBC # BLD: 2 /100 WBCS — HIGH (ref 0–0)
PLATELET # BLD AUTO: 417 K/UL — HIGH (ref 150–400)
POTASSIUM SERPL-MCNC: 4.2 MMOL/L — SIGNIFICANT CHANGE UP (ref 3.5–5.3)
POTASSIUM SERPL-SCNC: 4.2 MMOL/L — SIGNIFICANT CHANGE UP (ref 3.5–5.3)
PROTHROM AB SERPL-ACNC: 14.8 SEC — HIGH (ref 9.5–13)
RBC # BLD: 4.32 M/UL — SIGNIFICANT CHANGE UP (ref 3.8–5.2)
RBC # FLD: 23.9 % — HIGH (ref 10.3–14.5)
SODIUM SERPL-SCNC: 144 MMOL/L — SIGNIFICANT CHANGE UP (ref 135–145)
WBC # BLD: 15.2 K/UL — HIGH (ref 3.8–10.5)
WBC # FLD AUTO: 15.2 K/UL — HIGH (ref 3.8–10.5)

## 2023-09-25 PROCEDURE — 99232 SBSQ HOSP IP/OBS MODERATE 35: CPT

## 2023-09-25 RX ORDER — INSULIN GLARGINE 100 [IU]/ML
34 INJECTION, SOLUTION SUBCUTANEOUS AT BEDTIME
Refills: 0 | Status: DISCONTINUED | OUTPATIENT
Start: 2023-09-25 | End: 2023-09-26

## 2023-09-25 RX ORDER — INSULIN LISPRO 100/ML
10 VIAL (ML) SUBCUTANEOUS ONCE
Refills: 0 | Status: COMPLETED | OUTPATIENT
Start: 2023-09-25 | End: 2023-09-25

## 2023-09-25 RX ORDER — WARFARIN SODIUM 2.5 MG/1
6 TABLET ORAL ONCE
Refills: 0 | Status: COMPLETED | OUTPATIENT
Start: 2023-09-25 | End: 2023-09-25

## 2023-09-25 RX ORDER — INSULIN LISPRO 100/ML
14 VIAL (ML) SUBCUTANEOUS
Refills: 0 | Status: DISCONTINUED | OUTPATIENT
Start: 2023-09-25 | End: 2023-09-26

## 2023-09-25 RX ORDER — INSULIN LISPRO 100/ML
16 VIAL (ML) SUBCUTANEOUS
Refills: 0 | Status: DISCONTINUED | OUTPATIENT
Start: 2023-09-26 | End: 2023-09-27

## 2023-09-25 RX ADMIN — Medication 3 MILLILITER(S): at 05:34

## 2023-09-25 RX ADMIN — CLOPIDOGREL BISULFATE 75 MILLIGRAM(S): 75 TABLET, FILM COATED ORAL at 11:31

## 2023-09-25 RX ADMIN — Medication 10 UNIT(S): at 08:25

## 2023-09-25 RX ADMIN — TRAMADOL HYDROCHLORIDE 50 MILLIGRAM(S): 50 TABLET ORAL at 21:39

## 2023-09-25 RX ADMIN — Medication 2 MILLILITER(S): at 05:33

## 2023-09-25 RX ADMIN — Medication 20 MILLIGRAM(S): at 05:33

## 2023-09-25 RX ADMIN — Medication 50 MILLIGRAM(S): at 06:09

## 2023-09-25 RX ADMIN — Medication 100 MILLIGRAM(S): at 21:40

## 2023-09-25 RX ADMIN — Medication 3 MILLILITER(S): at 11:27

## 2023-09-25 RX ADMIN — BUDESONIDE AND FORMOTEROL FUMARATE DIHYDRATE 2 PUFF(S): 160; 4.5 AEROSOL RESPIRATORY (INHALATION) at 05:34

## 2023-09-25 RX ADMIN — MEXILETINE HYDROCHLORIDE 200 MILLIGRAM(S): 150 CAPSULE ORAL at 21:39

## 2023-09-25 RX ADMIN — ERTAPENEM SODIUM 120 MILLIGRAM(S): 1 INJECTION, POWDER, LYOPHILIZED, FOR SOLUTION INTRAMUSCULAR; INTRAVENOUS at 21:44

## 2023-09-25 RX ADMIN — Medication 1 APPLICATION(S): at 05:33

## 2023-09-25 RX ADMIN — POLYETHYLENE GLYCOL 3350 17 GRAM(S): 17 POWDER, FOR SOLUTION ORAL at 17:34

## 2023-09-25 RX ADMIN — CHLORHEXIDINE GLUCONATE 1 APPLICATION(S): 213 SOLUTION TOPICAL at 05:31

## 2023-09-25 RX ADMIN — Medication 200 MILLIGRAM(S): at 00:20

## 2023-09-25 RX ADMIN — SENNA PLUS 2 TABLET(S): 8.6 TABLET ORAL at 21:39

## 2023-09-25 RX ADMIN — GABAPENTIN 100 MILLIGRAM(S): 400 CAPSULE ORAL at 05:32

## 2023-09-25 RX ADMIN — POLYETHYLENE GLYCOL 3350 17 GRAM(S): 17 POWDER, FOR SOLUTION ORAL at 05:36

## 2023-09-25 RX ADMIN — Medication 50 MILLIGRAM(S): at 21:41

## 2023-09-25 RX ADMIN — Medication 2 MILLILITER(S): at 13:28

## 2023-09-25 RX ADMIN — Medication 200 MILLIGRAM(S): at 17:34

## 2023-09-25 RX ADMIN — Medication 100 MILLIGRAM(S): at 13:18

## 2023-09-25 RX ADMIN — INSULIN GLARGINE 34 UNIT(S): 100 INJECTION, SOLUTION SUBCUTANEOUS at 21:44

## 2023-09-25 RX ADMIN — WARFARIN SODIUM 6 MILLIGRAM(S): 2.5 TABLET ORAL at 21:39

## 2023-09-25 RX ADMIN — MEXILETINE HYDROCHLORIDE 200 MILLIGRAM(S): 150 CAPSULE ORAL at 05:34

## 2023-09-25 RX ADMIN — Medication 3 MILLILITER(S): at 00:20

## 2023-09-25 RX ADMIN — Medication 4: at 17:35

## 2023-09-25 RX ADMIN — PANTOPRAZOLE SODIUM 40 MILLIGRAM(S): 20 TABLET, DELAYED RELEASE ORAL at 11:28

## 2023-09-25 RX ADMIN — TIOTROPIUM BROMIDE 2 PUFF(S): 18 CAPSULE ORAL; RESPIRATORY (INHALATION) at 11:27

## 2023-09-25 RX ADMIN — MEXILETINE HYDROCHLORIDE 200 MILLIGRAM(S): 150 CAPSULE ORAL at 13:18

## 2023-09-25 RX ADMIN — Medication 600 MILLIGRAM(S): at 17:34

## 2023-09-25 RX ADMIN — Medication 200 MILLIGRAM(S): at 05:35

## 2023-09-25 RX ADMIN — Medication 1 TABLET(S): at 11:31

## 2023-09-25 RX ADMIN — Medication 14 UNIT(S): at 17:34

## 2023-09-25 RX ADMIN — Medication 100 MILLIGRAM(S): at 05:32

## 2023-09-25 RX ADMIN — Medication 600 MILLIGRAM(S): at 05:32

## 2023-09-25 RX ADMIN — ATORVASTATIN CALCIUM 20 MILLIGRAM(S): 80 TABLET, FILM COATED ORAL at 21:40

## 2023-09-25 RX ADMIN — GABAPENTIN 100 MILLIGRAM(S): 400 CAPSULE ORAL at 21:39

## 2023-09-25 RX ADMIN — Medication 500 MILLIGRAM(S): at 11:28

## 2023-09-25 RX ADMIN — GABAPENTIN 100 MILLIGRAM(S): 400 CAPSULE ORAL at 13:18

## 2023-09-25 RX ADMIN — Medication 14 UNIT(S): at 11:57

## 2023-09-25 RX ADMIN — TRAMADOL HYDROCHLORIDE 50 MILLIGRAM(S): 50 TABLET ORAL at 22:39

## 2023-09-25 RX ADMIN — Medication 200 MILLIGRAM(S): at 11:28

## 2023-09-25 RX ADMIN — Medication 3 MILLILITER(S): at 17:34

## 2023-09-25 RX ADMIN — Medication 4 UNIT(S): at 21:48

## 2023-09-25 RX ADMIN — BUDESONIDE AND FORMOTEROL FUMARATE DIHYDRATE 2 PUFF(S): 160; 4.5 AEROSOL RESPIRATORY (INHALATION) at 17:35

## 2023-09-25 NOTE — PROVIDER CONTACT NOTE (OTHER) - REASON
R elbow DTI, Sacrum DTI, L heel DTI, R heel DTI, R medial foot ulcer healed
patient IV site hard to touch

## 2023-09-25 NOTE — PROGRESS NOTE ADULT - ASSESSMENT
72 F w/h/o uncontrolled T2DM (A1C 9.4%) on basal/bolus insulin PTA. Unknown DM complications. Also COPD, secondary adrenal Insufficiency on chronic steroids, colorectal cancer s/p resection (colostomy bag), Chronic A fib on Eliquis, and tracheomalacia and multiple intubations, type A aortic dissection s/p aortic dissection repair on 9/07/22, sepsis. Pt well known to this provider from prior admissions. Pt recently admitted with SOB and found to have anemia and severe aortic stenosis>requiring valve in valve TAVR 9/7/23 discharged 9/10/23. Back with increasing productive cough, dyspnea and also found to have Herpes zoster on antiviral. Started Methylprednisolone 20 mg IV q8h with rebound hyperglycemia. Endocrine consult for management of hyperglycemia. Tolerating POs with BG still above goal. Pt eating night time snack on and off but not taking extra insulin with it. Will continue to increase insulin to BG goal 100 to 180s. No hypoglycemia         Additional Area 2 Units: 4

## 2023-09-25 NOTE — PROVIDER CONTACT NOTE (OTHER) - ACTION/TREATMENT ORDERED:
Provider aware. Continue care plan
IV taken out, hot packs placed at site, elevated arm  will continue to monitor

## 2023-09-25 NOTE — PROVIDER CONTACT NOTE (OTHER) - BACKGROUND
71 y/o female hx afib, asthma, aortic insufficiency admitted with shortness of breath
DX: SOB status post TAVR. Contact precautions

## 2023-09-25 NOTE — PROGRESS NOTE ADULT - ASSESSMENT
72 y.o. F with PMH of asthma on chronic steroids, bronchiectasis, allergic rhinitis,  recurrent PNA,  tracheomalacia s/p tracheoplasty, ESBL proteus infections (sputum),  diabetes, paroxysmal A-fib on coumadin, colorectal cancer many years ago status post colostomy, recent hospitalization at an outside hospital  for acute respiratory failure with hypoxia secondary to asthma/COPD exacerbation and bronchopneumonia 6/5/2023 - 6/16/2023), and AVR 2022 with Dr. Cabrera.  Pt admits to chronic SOB and cough for years and is up to date on vaccines. Dr. Allison follows as an outpatient for her COPD. s/p 9/6 TAVR- [facility] with Dr. Gonsalves and Dr. Leahy. discharged home 9/10. Pt presents to office today for f/u visit  w/ c/o of worsening SOB. Pt to be admitted for further workup and eval. Pt stable at this time. VSS; O2 sats 97% RA.  ,H/o  ,bronchiectasis, tracheomalacia s/p tracheoplasty who presents after a recent admission for bronchiectasis exacerbation (6/2023, treated with Ertapenem for ESBL proteus), and again in 9/2023 for an acute flare whose hospital course was complicated by identification of severe AS requiring valve in valve TAVR 9/7/23    bronchiectasis  9/13 Pulm eval /rec Routine sputum culture, urine strep/legionella, RVP, COVID  Considering culture history would start ertapenem and vancomycin for MRSA   Bronchodilator: Standing duonebs q 4 hours, nebulized pulmicort  -  iv steroids solumedrol 20 iv q 8  - vest therapy  - mucous for culture and sensitivity  - nebulized mucomyst  - Ensure she is getting twice per day airway clearance: Albuterol nebulizer followed by saline nebulizer followed by cough assist device and encouragement to cough and clear  - Pulmonary will continue to follow  - steroid taper to oral  as per pulm  - pt refusing prednisone as she has been on methylprednisolone since childhood  - ertapenam per ID - complete 5 days    cough  - Robitussin  - tessalon    S/P TAVR (transcatheter aortic valve replacement).   ·  Plan: Structural Team Following   Continue with Plavix 75 mg PO daily   Continue with Mexiletine 200 mg every 8 hours   Continue with Atorvastatin 20 mg PO HS    Daily EKG   Increase activity as tolerated.   Encourage Chest PT / Pulmonary toileting and Incentive spirometry every 1 hour x 10 while awake.   Continue with PUD and DVT prophylaxis.   Daily Shower     shingles  - valcyte for 7 days      Uncontrolled type 2 diabetes mellitus with hyperglycemia.   ·  Plan: Test BG ac and hs and 2am if eating. Q6H while NPO  -restart Lantus 32 units sq hs   - restart  Admelog 15-16-18 units qac   Hold if NPO or eating less than 50% of meals.  -  moderate correction scale ac meals and moderate correction hs and 2am.      Plan to d/c on basal bolus.   Outpt. endo follow-up. Dr Saavdera  Outpt. optho, podiatry, micro/cr  Consider Freesytle Luis E 3  upon discharge.    Essential hypertension.   ·  Plan: Goal BP <130/80   - cont current meds     Hyperlipidemia.   ·  Plan: LDL goal <70 due to DM  Pt LDL N/A  Order fasting lipid if not recently done  Statin as noted above     F/u levels as out pt.    H/O adrenal insufficiency.   ·  Plan: On chronic steroids at home      Atrial fibrillation.   ·  Plan: Continue with Mexiletine 200 mg every 8 hours   Daily PT/ INR and dose coumadin    dispo  - d/c planning home with home PT

## 2023-09-25 NOTE — PROGRESS NOTE ADULT - SUBJECTIVE AND OBJECTIVE BOX
DIABETES FOLLOW UP NOTE: Saw pt earlier today    Chief Complaint: Endocrine consult requested for management of T2DM    INTERVAL HX:      Review of Systems:  General: As above  Cardiovascular: No chest pain, palpitations  Respiratory: No SOB, no cough  GI: No nausea, vomiting, abdominal pain  Endocrine: no polyuria, polydipsia or S&Sx of hypoglycemia    Allergies    tetanus toxoid (Short breath)  Dilaudid (Short breath)  codeine (Short breath)  iodine (Short breath; Swelling)  Avelox (Short breath; Pruritus)  shellfish (Anaphylaxis)  cefepime (Anaphylaxis)  aspirin (Short breath)  Valium (Short breath)  penicillin (Anaphylaxis)    Intolerances      MEDICATIONS  (STANDING):  acetylcysteine 10%  Inhalation 2 milliLiter(s) Inhalation every 8 hours  albuterol/ipratropium for Nebulization 3 milliLiter(s) Nebulizer every 6 hours  ascorbic acid 500 milliGRAM(s) Oral daily  atorvastatin 20 milliGRAM(s) Oral at bedtime  benzonatate 100 milliGRAM(s) Oral three times a day  budesonide  80 MICROgram(s)/formoterol 4.5 MICROgram(s) Inhaler 2 Puff(s) Inhalation two times a day  chlorhexidine 2% Cloths 1 Application(s) Topical <User Schedule>  clopidogrel Tablet 75 milliGRAM(s) Oral daily  diphenhydrAMINE Injectable 50 milliGRAM(s) IV Push daily  ertapenem  IVPB 1000 milliGRAM(s) IV Intermittent every 24 hours  gabapentin 100 milliGRAM(s) Oral every 8 hours  guaiFENesin  milliGRAM(s) Oral every 12 hours  guaiFENesin Oral Liquid (Sugar-Free) 200 milliGRAM(s) Oral every 6 hours  hydrocortisone 1% Cream 1 Application(s) Topical two times a day  insulin glargine Injectable (LANTUS) 44 Unit(s) SubCutaneous at bedtime  insulin lispro (ADMELOG) corrective regimen sliding scale   SubCutaneous <User Schedule>  insulin lispro (ADMELOG) corrective regimen sliding scale   SubCutaneous three times a day before meals  insulin lispro Injectable (ADMELOG) 14 Unit(s) SubCutaneous before lunch  insulin lispro Injectable (ADMELOG) 16 Unit(s) SubCutaneous before dinner  insulin lispro Injectable (ADMELOG) 4 Unit(s) SubCutaneous at bedtime  insulin lispro Injectable (ADMELOG) 28 Unit(s) SubCutaneous before breakfast  insulin lispro Injectable (ADMELOG). 8 Unit(s) SubCutaneous once  lidocaine   4% Patch 2 Patch Transdermal daily  methylPREDNISolone 40 milliGRAM(s) Oral daily  mexiletine 200 milliGRAM(s) Oral every 8 hours  multivitamin 1 Tablet(s) Oral daily  pantoprazole    Tablet 40 milliGRAM(s) Oral daily  polyethylene glycol 3350 17 Gram(s) Oral two times a day  senna 2 Tablet(s) Oral at bedtime  tiotropium 2.5 MICROgram(s) Inhaler 2 Puff(s) Inhalation daily  warfarin 6 milliGRAM(s) Oral once      PHYSICAL EXAM:  VITALS: T(C): 36.8 (09-25-23 @ 11:50)  T(F): 98.2 (09-25-23 @ 11:50), Max: 98.9 (09-24-23 @ 20:02)  HR: 86 (09-25-23 @ 13:21) (72 - 90)  BP: 123/68 (09-25-23 @ 13:21) (112/56 - 132/75)  RR:  (18 - 18)  SpO2:  (97% - 100%)  Wt(kg): --  GENERAL: in NAD  Abdomen: Soft, nontender, non distended  Extremities: Warm, no edema in all 4 exts  NEURO: A&O X3    LABS:  POCT Blood Glucose.: 128 mg/dL (09-25-23 @ 11:45)  POCT Blood Glucose.: 93 mg/dL (09-25-23 @ 07:50)  POCT Blood Glucose.: 204 mg/dL (09-25-23 @ 02:12)  POCT Blood Glucose.: 201 mg/dL (09-24-23 @ 21:03)  POCT Blood Glucose.: 146 mg/dL (09-24-23 @ 17:06)  POCT Blood Glucose.: 129 mg/dL (09-24-23 @ 11:52)  POCT Blood Glucose.: 256 mg/dL (09-24-23 @ 08:41)  POCT Blood Glucose.: 207 mg/dL (09-24-23 @ 01:54)  POCT Blood Glucose.: 160 mg/dL (09-23-23 @ 20:54)  POCT Blood Glucose.: 90 mg/dL (09-23-23 @ 17:25)  POCT Blood Glucose.: 228 mg/dL (09-23-23 @ 12:51)  POCT Blood Glucose.: 245 mg/dL (09-23-23 @ 09:54)  POCT Blood Glucose.: 313 mg/dL (09-23-23 @ 02:04)  POCT Blood Glucose.: 334 mg/dL (09-22-23 @ 21:05)  POCT Blood Glucose.: 78 mg/dL (09-22-23 @ 17:17)                            10.1   15.20 )-----------( 417      ( 25 Sep 2023 05:53 )             33.8       09-25    144  |  105  |  22  ----------------------------<  106<H>  4.2   |  29  |  0.68    eGFR: 91    Ca    8.8      09-25        eGFR: 91 mL/min/1.73m2 (09-25-23 @ 05:53)        Thyroid Function Tests:  09-06 @ 09:32 TSH 0.49 FreeT4 1.2 T3 82 Anti TPO -- Anti Thyroglobulin Ab -- TSI --      A1C with Estimated Average Glucose Result: 5.7 % (09-08-23 @ 06:39)      Estimated Average Glucose: 117 mg/dL (09-08-23 @ 06:39)

## 2023-09-25 NOTE — PROGRESS NOTE ADULT - ASSESSMENT
72 F w/h/o uncontrolled T2DM (A1C 9.4%) on basal/bolus insulin PTA. Unknown DM complications. Also COPD, secondary adrenal Insufficiency on chronic steroids, colorectal cancer s/p resection (colostomy bag), Chronic A fib on Eliquis, and tracheomalacia and multiple intubations, type A aortic dissection s/p aortic dissection repair on 9/07/22, sepsis. Pt well known to this provider from prior admissions. Pt recently admitted with SOB and found to have anemia and severe aortic stenosis>requiring valve in valve TAVR 9/7/23 discharged 9/10/23. Back with increasing productive cough, dyspnea and also found to have Herpes zoster completed antiviral tx. Started Methylprednisolone 20 mg IV q8h with rebound hyperglycemia. Endocrine consult for management of hyperglycemia. Tolerating POs with BG levesl improving slowly. Now starting Methylprednisolone taper q 7 days 40mg to start today. Received 20mg iv this am but noted FBG <100s without rebound hyperglycemia at lunch time even though pt only receive 10 units of Admelog instead of 28 units ordered. Pt eating night time snack on and off > took extra insulin with it last night. No hypoglycemia. Since steroid doses are coming down will adjust insulin again to BG goal 100 to 180s.

## 2023-09-25 NOTE — PROGRESS NOTE ADULT - PROBLEM SELECTOR PLAN 4
Started on slow steroid taper to goal dose of Methylprednisolone 16mg /day.   Please inform team if pt having surgery or becomes critically ill because she would needs stress steroid doses.  F/u as out pt.

## 2023-09-25 NOTE — PROVIDER CONTACT NOTE (OTHER) - ASSESSMENT
A&Ox4  left forearm IV site hard to touch, no redness or warmth
Pt A&Ox4, able to make needs known. Pt asymptomatic. VSS. No s/s of bleeding. Pt denies cp, denies SOB, denies pain.

## 2023-09-25 NOTE — PROGRESS NOTE ADULT - SUBJECTIVE AND OBJECTIVE BOX
DIABETES FOLLOW UP NOTE: Saw pt earlier today    Chief Complaint: Endocrine consult requested for management of T2DM    INTERVAL HX: Pt stable, reports tolerating POs. BG levels  improving between 100s to low 200s in the last 24 hours while on present insulin doses and Methylprednisolone 20mg iv q8. FBG 93> pt received 10 units of admelog instead of 28 units with BG ac lunch of 128. Noted steroid doses changed today to Methylprednisolone PO 40mg daily x 5 days coming down slowly every 5 days to 16mg/day. No hypoglycemia.     Pt reports breathing is much better but worked with PT today and had SOB after taking 2 steps up. Pt states there are 14 steps in her house.     Review of Systems:  General: As above  Cardiovascular: No chest pain, palpitations  Respiratory: On and off SOB, no cough  GI: No nausea, vomiting, abdominal pain  Endocrine: No polyuria, polydipsia or S&Sx of hypoglycemia    Allergies    tetanus toxoid (Short breath)  Dilaudid (Short breath)  codeine (Short breath)  iodine (Short breath; Swelling)  Avelox (Short breath; Pruritus)  shellfish (Anaphylaxis)  cefepime (Anaphylaxis)  aspirin (Short breath)  Valium (Short breath)  penicillin (Anaphylaxis)    Intolerances      MEDICATIONS:  atorvastatin 20 milliGRAM(s) Oral at bedtime  ertapenem  IVPB 1000 milliGRAM(s) IV Intermittent every 24 hours  hydrocortisone 1% Cream 1 Application(s) Topical two times a day  insulin glargine Injectable (LANTUS) 44 Unit(s) SubCutaneous at bedtime  insulin lispro (ADMELOG) corrective regimen sliding scale   SubCutaneous three times a day before meals  insulin lispro (ADMELOG) corrective regimen sliding scale   SubCutaneous <User Schedule>  insulin lispro Injectable (ADMELOG) 14 Unit(s) SubCutaneous before lunch  insulin lispro Injectable (ADMELOG) 16 Unit(s) SubCutaneous before dinner  insulin lispro Injectable (ADMELOG) 4 Unit(s) SubCutaneous at bedtime  insulin lispro Injectable (ADMELOG) 28 Unit(s) SubCutaneous before breakfast  insulin lispro Injectable (ADMELOG). 8 Unit(s) SubCutaneous once  methylPREDNISolone 40 milliGRAM(s) Oral daily        PHYSICAL EXAM:  VITALS: T(C): 36.8 (09-25-23 @ 11:50)  T(F): 98.2 (09-25-23 @ 11:50), Max: 98.9 (09-24-23 @ 20:02)  HR: 86 (09-25-23 @ 13:21) (72 - 90)  BP: 123/68 (09-25-23 @ 13:21) (112/56 - 132/75)  RR:  (18 - 18)  SpO2:  (97% - 100%)  Wt(kg): --  GENERAL: Female laying in bed in NAD  Abdomen: Soft, nontender, non distended, central adiposity.   Extremities: Warm, no edema in all 4 exts  NEURO: A&O X3    LABS:  POCT Blood Glucose.: 128 mg/dL (09-25-23 @ 11:45)  POCT Blood Glucose.: 93 mg/dL (09-25-23 @ 07:50)  POCT Blood Glucose.: 204 mg/dL (09-25-23 @ 02:12)  POCT Blood Glucose.: 201 mg/dL (09-24-23 @ 21:03)  POCT Blood Glucose.: 146 mg/dL (09-24-23 @ 17:06)  POCT Blood Glucose.: 129 mg/dL (09-24-23 @ 11:52)  POCT Blood Glucose.: 256 mg/dL (09-24-23 @ 08:41)  POCT Blood Glucose.: 207 mg/dL (09-24-23 @ 01:54)  POCT Blood Glucose.: 160 mg/dL (09-23-23 @ 20:54)  POCT Blood Glucose.: 90 mg/dL (09-23-23 @ 17:25)  POCT Blood Glucose.: 228 mg/dL (09-23-23 @ 12:51)  POCT Blood Glucose.: 245 mg/dL (09-23-23 @ 09:54)  POCT Blood Glucose.: 313 mg/dL (09-23-23 @ 02:04)  POCT Blood Glucose.: 334 mg/dL (09-22-23 @ 21:05)  POCT Blood Glucose.: 78 mg/dL (09-22-23 @ 17:17)                            10.1   15.20 )-----------( 417      ( 25 Sep 2023 05:53 )             33.8       09-25    144  |  105  |  22  ----------------------------<  106<H>  4.2   |  29  |  0.68    eGFR: 91    Ca    8.8      09-25    Thyroid Function Tests:  09-06 @ 09:32 TSH 0.49 FreeT4 1.2 T3 82 Anti TPO -- Anti Thyroglobulin Ab -- TSI --      A1C with Estimated Average Glucose Result: 5.7 % (09-08-23 @ 06:39)      Estimated Average Glucose: 117 mg/dL (09-08-23 @ 06:39)

## 2023-09-25 NOTE — PROGRESS NOTE ADULT - SUBJECTIVE AND OBJECTIVE BOX
DATE OF SERVICE: 09-25-23 @ 09:47    Patient is a 72y old  Female who presents with a chief complaint of sob (24 Sep 2023 14:19)      SUBJECTIVE / OVERNIGHT EVENTS:  No chest pain. No shortness of breath. No complaints. No events overnight.     MEDICATIONS  (STANDING):  acetylcysteine 10%  Inhalation 2 milliLiter(s) Inhalation every 8 hours  albuterol/ipratropium for Nebulization 3 milliLiter(s) Nebulizer every 6 hours  ascorbic acid 500 milliGRAM(s) Oral daily  atorvastatin 20 milliGRAM(s) Oral at bedtime  benzonatate 100 milliGRAM(s) Oral three times a day  budesonide  80 MICROgram(s)/formoterol 4.5 MICROgram(s) Inhaler 2 Puff(s) Inhalation two times a day  chlorhexidine 2% Cloths 1 Application(s) Topical <User Schedule>  clopidogrel Tablet 75 milliGRAM(s) Oral daily  diphenhydrAMINE Injectable 50 milliGRAM(s) IV Push daily  ertapenem  IVPB 1000 milliGRAM(s) IV Intermittent every 24 hours  gabapentin 100 milliGRAM(s) Oral every 8 hours  guaiFENesin  milliGRAM(s) Oral every 12 hours  guaiFENesin Oral Liquid (Sugar-Free) 200 milliGRAM(s) Oral every 6 hours  hydrocortisone 1% Cream 1 Application(s) Topical two times a day  insulin glargine Injectable (LANTUS) 44 Unit(s) SubCutaneous at bedtime  insulin lispro (ADMELOG) corrective regimen sliding scale   SubCutaneous three times a day before meals  insulin lispro (ADMELOG) corrective regimen sliding scale   SubCutaneous <User Schedule>  insulin lispro Injectable (ADMELOG) 14 Unit(s) SubCutaneous before lunch  insulin lispro Injectable (ADMELOG) 16 Unit(s) SubCutaneous before dinner  insulin lispro Injectable (ADMELOG) 4 Unit(s) SubCutaneous at bedtime  insulin lispro Injectable (ADMELOG) 28 Unit(s) SubCutaneous before breakfast  insulin lispro Injectable (ADMELOG). 8 Unit(s) SubCutaneous once  lidocaine   4% Patch 2 Patch Transdermal daily  mexiletine 200 milliGRAM(s) Oral every 8 hours  multivitamin 1 Tablet(s) Oral daily  pantoprazole    Tablet 40 milliGRAM(s) Oral daily  polyethylene glycol 3350 17 Gram(s) Oral two times a day  predniSONE   Tablet 50 milliGRAM(s) Oral daily  predniSONE   Tablet   Oral   senna 2 Tablet(s) Oral at bedtime  tiotropium 2.5 MICROgram(s) Inhaler 2 Puff(s) Inhalation daily    MEDICATIONS  (PRN):  acetaminophen     Tablet .. 650 milliGRAM(s) Oral every 6 hours PRN Temp greater or equal to 38C (100.4F), Mild Pain (1 - 3)  diphenhydrAMINE 25 milliGRAM(s) Oral every 4 hours PRN Rash and/or Itching  simethicone 80 milliGRAM(s) Chew every 12 hours PRN Gas  traMADol 50 milliGRAM(s) Oral every 8 hours PRN Moderate Pain (4 - 6)      Vital Signs Last 24 Hrs  T(C): 36.9 (25 Sep 2023 04:00), Max: 37.2 (24 Sep 2023 20:02)  T(F): 98.4 (25 Sep 2023 04:00), Max: 98.9 (24 Sep 2023 20:02)  HR: 72 (25 Sep 2023 04:00) (72 - 90)  BP: 112/56 (25 Sep 2023 04:00) (112/56 - 158/80)  BP(mean): --  RR: 18 (25 Sep 2023 04:00) (18 - 18)  SpO2: 97% (25 Sep 2023 04:00) (97% - 100%)    Parameters below as of 25 Sep 2023 04:00  Patient On (Oxygen Delivery Method): room air      CAPILLARY BLOOD GLUCOSE      POCT Blood Glucose.: 93 mg/dL (25 Sep 2023 07:50)  POCT Blood Glucose.: 204 mg/dL (25 Sep 2023 02:12)  POCT Blood Glucose.: 201 mg/dL (24 Sep 2023 21:03)  POCT Blood Glucose.: 146 mg/dL (24 Sep 2023 17:06)  POCT Blood Glucose.: 129 mg/dL (24 Sep 2023 11:52)    I&O's Summary    24 Sep 2023 07:01  -  25 Sep 2023 07:00  --------------------------------------------------------  IN: 680 mL / OUT: 0 mL / NET: 680 mL        PHYSICAL EXAM:  GENERAL: NAD, well-developed  HEAD:  Atraumatic, Normocephalic  EYES: EOMI, PERRLA, conjunctiva and sclera clear  NECK: Supple, No JVD  CHEST/LUNG: Clear to auscultation bilaterally; No wheeze  HEART: Regular rate and rhythm; No murmurs, rubs, or gallops  ABDOMEN: Soft, Nontender, Nondistended; Bowel sounds present  EXTREMITIES:  2+ Peripheral Pulses, No clubbing, cyanosis, or edema  PSYCH: AAOx3  NEUROLOGY: non-focal  SKIN: No rashes or lesions    LABS:                        10.1   15.20 )-----------( 417      ( 25 Sep 2023 05:53 )             33.8     09-25    144  |  105  |  22  ----------------------------<  106<H>  4.2   |  29  |  0.68    Ca    8.8      25 Sep 2023 05:53      PT/INR - ( 25 Sep 2023 05:54 )   PT: 14.8 sec;   INR: 1.42 ratio         PTT - ( 23 Sep 2023 11:25 )  PTT:27.7 sec      Urinalysis Basic - ( 25 Sep 2023 05:53 )    Color: x / Appearance: x / SG: x / pH: x  Gluc: 106 mg/dL / Ketone: x  / Bili: x / Urobili: x   Blood: x / Protein: x / Nitrite: x   Leuk Esterase: x / RBC: x / WBC x   Sq Epi: x / Non Sq Epi: x / Bacteria: x        RADIOLOGY & ADDITIONAL TESTS:    Imaging Personally Reviewed:    Consultant(s) Notes Reviewed:      Care Discussed with Consultants/Other Providers:

## 2023-09-26 LAB
GLUCOSE BLDC GLUCOMTR-MCNC: 104 MG/DL — HIGH (ref 70–99)
GLUCOSE BLDC GLUCOMTR-MCNC: 157 MG/DL — HIGH (ref 70–99)
GLUCOSE BLDC GLUCOMTR-MCNC: 205 MG/DL — HIGH (ref 70–99)
GLUCOSE BLDC GLUCOMTR-MCNC: 56 MG/DL — LOW (ref 70–99)
GLUCOSE BLDC GLUCOMTR-MCNC: 77 MG/DL — SIGNIFICANT CHANGE UP (ref 70–99)
GLUCOSE BLDC GLUCOMTR-MCNC: 82 MG/DL — SIGNIFICANT CHANGE UP (ref 70–99)
GLUCOSE BLDC GLUCOMTR-MCNC: 90 MG/DL — SIGNIFICANT CHANGE UP (ref 70–99)
GLUCOSE BLDC GLUCOMTR-MCNC: 98 MG/DL — SIGNIFICANT CHANGE UP (ref 70–99)
HCT VFR BLD CALC: 35.2 % — SIGNIFICANT CHANGE UP (ref 34.5–45)
HGB BLD-MCNC: 10.1 G/DL — LOW (ref 11.5–15.5)
INR BLD: 2.12 RATIO — HIGH (ref 0.85–1.18)
MCHC RBC-ENTMCNC: 22.6 PG — LOW (ref 27–34)
MCHC RBC-ENTMCNC: 28.7 GM/DL — LOW (ref 32–36)
MCV RBC AUTO: 78.7 FL — LOW (ref 80–100)
NRBC # BLD: 1 /100 WBCS — HIGH (ref 0–0)
PLATELET # BLD AUTO: 362 K/UL — SIGNIFICANT CHANGE UP (ref 150–400)
PROTHROM AB SERPL-ACNC: 21.8 SEC — HIGH (ref 9.5–13)
RBC # BLD: 4.47 M/UL — SIGNIFICANT CHANGE UP (ref 3.8–5.2)
RBC # FLD: 24.6 % — HIGH (ref 10.3–14.5)
WBC # BLD: 19.83 K/UL — HIGH (ref 3.8–10.5)
WBC # FLD AUTO: 19.83 K/UL — HIGH (ref 3.8–10.5)

## 2023-09-26 PROCEDURE — 99232 SBSQ HOSP IP/OBS MODERATE 35: CPT

## 2023-09-26 RX ORDER — DEXTROSE 50 % IN WATER 50 %
25 SYRINGE (ML) INTRAVENOUS ONCE
Refills: 0 | Status: DISCONTINUED | OUTPATIENT
Start: 2023-09-26 | End: 2023-10-01

## 2023-09-26 RX ORDER — DEXTROSE 50 % IN WATER 50 %
12.5 SYRINGE (ML) INTRAVENOUS ONCE
Refills: 0 | Status: DISCONTINUED | OUTPATIENT
Start: 2023-09-26 | End: 2023-10-01

## 2023-09-26 RX ORDER — SODIUM CHLORIDE 9 MG/ML
1000 INJECTION, SOLUTION INTRAVENOUS
Refills: 0 | Status: DISCONTINUED | OUTPATIENT
Start: 2023-09-26 | End: 2023-10-01

## 2023-09-26 RX ORDER — INSULIN GLARGINE 100 [IU]/ML
28 INJECTION, SOLUTION SUBCUTANEOUS AT BEDTIME
Refills: 0 | Status: DISCONTINUED | OUTPATIENT
Start: 2023-09-26 | End: 2023-09-27

## 2023-09-26 RX ORDER — INSULIN LISPRO 100/ML
12 VIAL (ML) SUBCUTANEOUS
Refills: 0 | Status: DISCONTINUED | OUTPATIENT
Start: 2023-09-27 | End: 2023-09-29

## 2023-09-26 RX ORDER — WARFARIN SODIUM 2.5 MG/1
2.5 TABLET ORAL ONCE
Refills: 0 | Status: COMPLETED | OUTPATIENT
Start: 2023-09-26 | End: 2023-09-26

## 2023-09-26 RX ORDER — DEXTROSE 50 % IN WATER 50 %
15 SYRINGE (ML) INTRAVENOUS ONCE
Refills: 0 | Status: DISCONTINUED | OUTPATIENT
Start: 2023-09-26 | End: 2023-10-01

## 2023-09-26 RX ORDER — GLUCAGON INJECTION, SOLUTION 0.5 MG/.1ML
1 INJECTION, SOLUTION SUBCUTANEOUS ONCE
Refills: 0 | Status: DISCONTINUED | OUTPATIENT
Start: 2023-09-26 | End: 2023-10-01

## 2023-09-26 RX ORDER — INSULIN LISPRO 100/ML
12 VIAL (ML) SUBCUTANEOUS
Refills: 0 | Status: DISCONTINUED | OUTPATIENT
Start: 2023-09-27 | End: 2023-09-27

## 2023-09-26 RX ORDER — WARFARIN SODIUM 2.5 MG/1
5 TABLET ORAL ONCE
Refills: 0 | Status: DISCONTINUED | OUTPATIENT
Start: 2023-09-26 | End: 2023-09-26

## 2023-09-26 RX ADMIN — Medication 1 TABLET(S): at 11:23

## 2023-09-26 RX ADMIN — Medication 40 MILLIGRAM(S): at 05:27

## 2023-09-26 RX ADMIN — CLOPIDOGREL BISULFATE 75 MILLIGRAM(S): 75 TABLET, FILM COATED ORAL at 11:24

## 2023-09-26 RX ADMIN — Medication 16 UNIT(S): at 08:31

## 2023-09-26 RX ADMIN — Medication 500 MILLIGRAM(S): at 11:23

## 2023-09-26 RX ADMIN — SENNA PLUS 2 TABLET(S): 8.6 TABLET ORAL at 22:02

## 2023-09-26 RX ADMIN — Medication 14 UNIT(S): at 12:02

## 2023-09-26 RX ADMIN — GABAPENTIN 100 MILLIGRAM(S): 400 CAPSULE ORAL at 21:54

## 2023-09-26 RX ADMIN — GABAPENTIN 100 MILLIGRAM(S): 400 CAPSULE ORAL at 05:27

## 2023-09-26 RX ADMIN — WARFARIN SODIUM 2.5 MILLIGRAM(S): 2.5 TABLET ORAL at 21:54

## 2023-09-26 RX ADMIN — Medication 650 MILLIGRAM(S): at 06:35

## 2023-09-26 RX ADMIN — Medication 100 MILLIGRAM(S): at 05:28

## 2023-09-26 RX ADMIN — BUDESONIDE AND FORMOTEROL FUMARATE DIHYDRATE 2 PUFF(S): 160; 4.5 AEROSOL RESPIRATORY (INHALATION) at 17:03

## 2023-09-26 RX ADMIN — ATORVASTATIN CALCIUM 20 MILLIGRAM(S): 80 TABLET, FILM COATED ORAL at 21:55

## 2023-09-26 RX ADMIN — CHLORHEXIDINE GLUCONATE 1 APPLICATION(S): 213 SOLUTION TOPICAL at 05:30

## 2023-09-26 RX ADMIN — PANTOPRAZOLE SODIUM 40 MILLIGRAM(S): 20 TABLET, DELAYED RELEASE ORAL at 11:24

## 2023-09-26 RX ADMIN — Medication 2: at 08:30

## 2023-09-26 RX ADMIN — Medication 600 MILLIGRAM(S): at 17:02

## 2023-09-26 RX ADMIN — Medication 2 MILLILITER(S): at 21:56

## 2023-09-26 RX ADMIN — MEXILETINE HYDROCHLORIDE 200 MILLIGRAM(S): 150 CAPSULE ORAL at 21:54

## 2023-09-26 RX ADMIN — INSULIN GLARGINE 28 UNIT(S): 100 INJECTION, SOLUTION SUBCUTANEOUS at 21:53

## 2023-09-26 RX ADMIN — Medication 200 MILLIGRAM(S): at 23:54

## 2023-09-26 RX ADMIN — Medication 600 MILLIGRAM(S): at 05:30

## 2023-09-26 RX ADMIN — Medication 3 MILLILITER(S): at 17:07

## 2023-09-26 RX ADMIN — Medication 200 MILLIGRAM(S): at 11:24

## 2023-09-26 RX ADMIN — MEXILETINE HYDROCHLORIDE 200 MILLIGRAM(S): 150 CAPSULE ORAL at 13:43

## 2023-09-26 RX ADMIN — GABAPENTIN 100 MILLIGRAM(S): 400 CAPSULE ORAL at 13:43

## 2023-09-26 RX ADMIN — POLYETHYLENE GLYCOL 3350 17 GRAM(S): 17 POWDER, FOR SOLUTION ORAL at 17:03

## 2023-09-26 RX ADMIN — Medication 2 MILLILITER(S): at 13:45

## 2023-09-26 RX ADMIN — Medication 200 MILLIGRAM(S): at 17:02

## 2023-09-26 RX ADMIN — Medication 100 MILLIGRAM(S): at 21:54

## 2023-09-26 RX ADMIN — Medication 14 UNIT(S): at 17:46

## 2023-09-26 RX ADMIN — MEXILETINE HYDROCHLORIDE 200 MILLIGRAM(S): 150 CAPSULE ORAL at 05:27

## 2023-09-26 RX ADMIN — Medication 100 MILLIGRAM(S): at 13:44

## 2023-09-26 RX ADMIN — Medication 3 MILLILITER(S): at 23:54

## 2023-09-26 RX ADMIN — Medication 3 MILLILITER(S): at 11:24

## 2023-09-26 RX ADMIN — TIOTROPIUM BROMIDE 2 PUFF(S): 18 CAPSULE ORAL; RESPIRATORY (INHALATION) at 11:24

## 2023-09-26 NOTE — PROVIDER CONTACT NOTE (HYPOGLYCEMIA EVENT) - NS PROVIDER CONTACT BACKGROUND-HYPO
Age: 72y    Gender: Female    POCT Blood Glucose:  104 mg/dL (09-26-23 @ 03:14)  98 mg/dL (09-26-23 @ 02:57)  82 mg/dL (09-26-23 @ 02:39)  56 mg/dL (09-26-23 @ 02:15)  204 mg/dL (09-25-23 @ 21:22)  206 mg/dL (09-25-23 @ 17:02)  128 mg/dL (09-25-23 @ 11:45)  93 mg/dL (09-25-23 @ 07:50)      eMAR:atorvastatin   20 milliGRAM(s) Oral (09-25-23 @ 21:40)    insulin glargine Injectable (LANTUS)   34 Unit(s) SubCutaneous (09-25-23 @ 21:44)    insulin lispro (ADMELOG) corrective regimen sliding scale   4 Unit(s) SubCutaneous (09-25-23 @ 17:35)    insulin lispro Injectable (ADMELOG)   14 Unit(s) SubCutaneous (09-25-23 @ 11:57)    insulin lispro Injectable (ADMELOG)   4 Unit(s) SubCutaneous (09-25-23 @ 21:48)    insulin lispro Injectable (ADMELOG)   14 Unit(s) SubCutaneous (09-25-23 @ 17:34)    insulin lispro Injectable (ADMELOG).   10 Unit(s) SubCutaneous (09-25-23 @ 08:25)    methylPREDNISolone sodium succinate Injectable   20 milliGRAM(s) IV Push (09-25-23 @ 05:33)    predniSONE   Tablet   50 milliGRAM(s) Oral (09-25-23 @ 06:09)

## 2023-09-26 NOTE — PROGRESS NOTE ADULT - ASSESSMENT
72 F w/h/o uncontrolled T2DM (A1C 9.4%) on basal/bolus insulin PTA. Unknown DM complications. Also COPD, secondary adrenal Insufficiency on chronic steroids, colorectal cancer s/p resection (colostomy bag), Chronic A fib on Eliquis, and tracheomalacia and multiple intubations, type A aortic dissection s/p aortic dissection repair on 9/07/22, sepsis. Pt well known to this provider from prior admissions. Pt recently admitted with SOB and found to have anemia and severe aortic stenosis>requiring valve in valve TAVR 9/7/23 discharged 9/10/23. Back with increasing productive cough, dyspnea and also found to have Herpes zoster completed antiviral tx. On Methylprednisolone 40mg mg po.  Endocrine consult for management of hyperglycemia. Tolerating POs with BG levels variable with hypo and hyperglycemia due to changes on steroid doses and unpredictable PO intake. Decreased basal insulin further due to overnight hypoglycemia. Pt reminded to report to staff if not eating so meal time insulin can be held> pt verbalized understanding. BG goal 100 to 180s.

## 2023-09-26 NOTE — PROGRESS NOTE ADULT - SUBJECTIVE AND OBJECTIVE BOX
Podiatry pager #: [phone]/ [phone]    Patient is a 72y old  Female who presents with a chief complaint of sob (26 Sep 2023 15:40)Podiatry consult for painful skin lesion on the plantar aspect of the left heel.      HPI:  72F with with PMHF of asthma on chronic steroids, bronchiectasis, allergic rhinitis,  recurrent PNA,  tracheomalacia s/p tracheoplasty, ESBL proteus infections (sputum),  diabetes, paroxysmal A-fib on coumadin, colorectal cancer many years ago status post colostomy, recent hospitalization at an outside hospital  for acute respiratory failure with hypoxia secondary to asthma/COPD exacerbation and bronchopneumonia 6/5/2023 - 6/16/2023), and AVR 2022 with Dr. Cabrera.  Pt admits to chronic SOB and cough for years and is up to date on vaccines. Dr. Allison follows as an outpatient for her COPD. s/p 9/6 TAVR- [facility] with Dr. Gonsalves and Dr. Leahy. discharged home 9/10. Pt presents to office today for f/u visit  w/ c/o of worsening SOB. Pt to be admitted for further workup and eval. Pt stable at this time. VSS; O2 sats 97% RA.           (12 Sep 2023 12:16)      PAST MEDICAL & SURGICAL HISTORY:  Atrial fibrillation  paroxysmal, on eliquis      Diabetes  Type 2      COPD (chronic obstructive pulmonary disease)      Adrenal insufficiency  Medrol daily for over 50 years      Aortic insufficiency  moderate AR on echo 5/3/2018      Pelvic fracture      Asthma      Tracheobronchomalacia  diagnosed 2015, s/p bronchial thermoplasty 2016 (Dr Zapien); recent bronchoscopy 6/5/2018 revealed no evidence of tracheobronchomalacia in trachea or bronchial tubes      Colorectal cancer  4/2018- last treatment , chemo and radiation      Rectal bleeding      Seizure  x 1 1/7/18      DVT (deep venous thrombosis)  15-20 years ago, took coumadin      TIA (transient ischemic attack)  multiple, last 5 years ago - presents as right-sided weakness      History of partial hysterectomy  30 years ago - fibroids      H/O total knee replacement, bilateral  5 years ago      History of sinus surgery  multiple sinus surgeries      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      H/O pelvic surgery  5 years ago - s/p fracture      History of tracheomalacia  2015 - attempted tracheal stenting ([facility])- course complicated by obstruction, respiratory failure, multiple CPR attempts -  stent discontinued; 10/20/2016 Tracheobronchoplasty ([facility]) performed at [facility] by Dr Zapien      S/P bronchoscopy  6/5/2018 - [facility] (Dr Zapien) no evidence of tracheobronchomalacia in trachea or bronchial tubes      Rectal bleeding  exam under anesthesia ([facility]) 2/2018      S/P TAVR (transcatheter aortic valve replacement)      S/P aortic valve replacement          MEDICATIONS  (STANDING):  acetylcysteine 10%  Inhalation 2 milliLiter(s) Inhalation every 8 hours  albuterol/ipratropium for Nebulization 3 milliLiter(s) Nebulizer every 6 hours  ascorbic acid 500 milliGRAM(s) Oral daily  atorvastatin 20 milliGRAM(s) Oral at bedtime  benzonatate 100 milliGRAM(s) Oral three times a day  budesonide  80 MICROgram(s)/formoterol 4.5 MICROgram(s) Inhaler 2 Puff(s) Inhalation two times a day  chlorhexidine 2% Cloths 1 Application(s) Topical <User Schedule>  clopidogrel Tablet 75 milliGRAM(s) Oral daily  dextrose 5%. 1000 milliLiter(s) (50 mL/Hr) IV Continuous <Continuous>  dextrose 5%. 1000 milliLiter(s) (100 mL/Hr) IV Continuous <Continuous>  dextrose 50% Injectable 25 Gram(s) IV Push once  dextrose 50% Injectable 25 Gram(s) IV Push once  dextrose 50% Injectable 12.5 Gram(s) IV Push once  gabapentin 100 milliGRAM(s) Oral every 8 hours  glucagon  Injectable 1 milliGRAM(s) IntraMuscular once  guaiFENesin  milliGRAM(s) Oral every 12 hours  guaiFENesin Oral Liquid (Sugar-Free) 200 milliGRAM(s) Oral every 6 hours  hydrocortisone 1% Cream 1 Application(s) Topical two times a day  insulin glargine Injectable (LANTUS) 28 Unit(s) SubCutaneous at bedtime  insulin lispro (ADMELOG) corrective regimen sliding scale   SubCutaneous <User Schedule>  insulin lispro (ADMELOG) corrective regimen sliding scale   SubCutaneous three times a day before meals  insulin lispro Injectable (ADMELOG) 12 Unit(s) SubCutaneous before lunch  insulin lispro Injectable (ADMELOG) 4 Unit(s) SubCutaneous at bedtime  insulin lispro Injectable (ADMELOG) 16 Unit(s) SubCutaneous before breakfast  insulin lispro Injectable (ADMELOG) 12 Unit(s) SubCutaneous before dinner  lidocaine   4% Patch 2 Patch Transdermal daily  methylPREDNISolone 40 milliGRAM(s) Oral daily  mexiletine 200 milliGRAM(s) Oral every 8 hours  multivitamin 1 Tablet(s) Oral daily  pantoprazole    Tablet 40 milliGRAM(s) Oral daily  polyethylene glycol 3350 17 Gram(s) Oral two times a day  senna 2 Tablet(s) Oral at bedtime  tiotropium 2.5 MICROgram(s) Inhaler 2 Puff(s) Inhalation daily    MEDICATIONS  (PRN):  acetaminophen     Tablet .. 650 milliGRAM(s) Oral every 6 hours PRN Temp greater or equal to 38C (100.4F), Mild Pain (1 - 3)  dextrose Oral Gel 15 Gram(s) Oral once PRN Blood Glucose LESS THAN 70 milliGRAM(s)/deciliter  diphenhydrAMINE 25 milliGRAM(s) Oral every 4 hours PRN Rash and/or Itching  simethicone 80 milliGRAM(s) Chew every 12 hours PRN Gas  traMADol 50 milliGRAM(s) Oral every 8 hours PRN Moderate Pain (4 - 6)      Allergies    tetanus toxoid (Short breath)  Dilaudid (Short breath)  codeine (Short breath)  iodine (Short breath; Swelling)  Avelox (Short breath; Pruritus)  shellfish (Anaphylaxis)  cefepime (Anaphylaxis)  aspirin (Short breath)  Valium (Short breath)  penicillin (Anaphylaxis)    Intolerances        VITALS:    Vital Signs Last 24 Hrs  T(C): 38.2 (27 Sep 2023 04:37), Max: 38.2 (27 Sep 2023 04:37)  T(F): 100.7 (27 Sep 2023 04:37), Max: 100.7 (27 Sep 2023 04:37)  HR: 94 (27 Sep 2023 04:37) (76 - 94)  BP: 115/73 (27 Sep 2023 04:37) (110/66 - 160/77)  BP(mean): --  RR: 18 (27 Sep 2023 04:37) (18 - 18)  SpO2: 96% (27 Sep 2023 04:37) (96% - 98%)    Parameters below as of 27 Sep 2023 04:37  Patient On (Oxygen Delivery Method): room air        LABS:                          10.1   19.83 )-----------( 362      ( 26 Sep 2023 07:08 )             35.2               CAPILLARY BLOOD GLUCOSE      POCT Blood Glucose.: 128 mg/dL (27 Sep 2023 07:50)  POCT Blood Glucose.: 177 mg/dL (27 Sep 2023 01:59)  POCT Blood Glucose.: 205 mg/dL (26 Sep 2023 21:10)  POCT Blood Glucose.: 90 mg/dL (26 Sep 2023 17:27)  POCT Blood Glucose.: 77 mg/dL (26 Sep 2023 11:36)      PT/INR - ( 26 Sep 2023 07:08 )   PT: 21.8 sec;   INR: 2.12 ratio             LOWER EXTREMITY PHYSICAL EXAM:    Vasular: DP/PT _1/4, B/L, CFT <_2 seconds B/L, Temperature gradient _wnl, B/L.   Neuro: Epicritic sensation _diminished to the level of _toes, B/L.  Skin: Positive xerosis plantarly bilateral.  Plantar aspect of the left heel positive pretrophic tyloma with pain on palpation.  No open ulcerations or clinical signs of infection.       RADIOLOGY & ADDITIONAL STUDIES:

## 2023-09-26 NOTE — PROGRESS NOTE ADULT - ASSESSMENT
Assessment/plan:    Pretrophic tyloma left heel: Stable, noninfected.  Diabetes mellitus    Aseptic excisional debridement with #15 blade of tyloma plantar aspect of the left heel with bacitracin applied.  Recommend continued routine podiatric foot care as an outpatient.  Recommend Lac-Hydrin lotion daily.  Podiatry to follow-up as necessary.

## 2023-09-26 NOTE — PROGRESS NOTE ADULT - SUBJECTIVE AND OBJECTIVE BOX
DATE OF SERVICE: 09-26-23 @ 11:46    Patient is a 72y old  Female who presents with a chief complaint of sob (25 Sep 2023 14:47)      SUBJECTIVE / OVERNIGHT EVENTS:  No chest pain. No shortness of breath. became sob on ambulation    MEDICATIONS  (STANDING):  acetylcysteine 10%  Inhalation 2 milliLiter(s) Inhalation every 8 hours  albuterol/ipratropium for Nebulization 3 milliLiter(s) Nebulizer every 6 hours  ascorbic acid 500 milliGRAM(s) Oral daily  atorvastatin 20 milliGRAM(s) Oral at bedtime  benzonatate 100 milliGRAM(s) Oral three times a day  budesonide  80 MICROgram(s)/formoterol 4.5 MICROgram(s) Inhaler 2 Puff(s) Inhalation two times a day  chlorhexidine 2% Cloths 1 Application(s) Topical <User Schedule>  clopidogrel Tablet 75 milliGRAM(s) Oral daily  dextrose 5%. 1000 milliLiter(s) (100 mL/Hr) IV Continuous <Continuous>  dextrose 5%. 1000 milliLiter(s) (50 mL/Hr) IV Continuous <Continuous>  dextrose 50% Injectable 25 Gram(s) IV Push once  dextrose 50% Injectable 12.5 Gram(s) IV Push once  dextrose 50% Injectable 25 Gram(s) IV Push once  gabapentin 100 milliGRAM(s) Oral every 8 hours  glucagon  Injectable 1 milliGRAM(s) IntraMuscular once  guaiFENesin  milliGRAM(s) Oral every 12 hours  guaiFENesin Oral Liquid (Sugar-Free) 200 milliGRAM(s) Oral every 6 hours  hydrocortisone 1% Cream 1 Application(s) Topical two times a day  insulin glargine Injectable (LANTUS) 28 Unit(s) SubCutaneous at bedtime  insulin lispro (ADMELOG) corrective regimen sliding scale   SubCutaneous three times a day before meals  insulin lispro (ADMELOG) corrective regimen sliding scale   SubCutaneous <User Schedule>  insulin lispro Injectable (ADMELOG) 14 Unit(s) SubCutaneous before lunch  insulin lispro Injectable (ADMELOG) 16 Unit(s) SubCutaneous before breakfast  insulin lispro Injectable (ADMELOG) 4 Unit(s) SubCutaneous at bedtime  insulin lispro Injectable (ADMELOG) 14 Unit(s) SubCutaneous before dinner  lidocaine   4% Patch 2 Patch Transdermal daily  methylPREDNISolone 40 milliGRAM(s) Oral daily  mexiletine 200 milliGRAM(s) Oral every 8 hours  multivitamin 1 Tablet(s) Oral daily  pantoprazole    Tablet 40 milliGRAM(s) Oral daily  polyethylene glycol 3350 17 Gram(s) Oral two times a day  senna 2 Tablet(s) Oral at bedtime  tiotropium 2.5 MICROgram(s) Inhaler 2 Puff(s) Inhalation daily    MEDICATIONS  (PRN):  acetaminophen     Tablet .. 650 milliGRAM(s) Oral every 6 hours PRN Temp greater or equal to 38C (100.4F), Mild Pain (1 - 3)  dextrose Oral Gel 15 Gram(s) Oral once PRN Blood Glucose LESS THAN 70 milliGRAM(s)/deciliter  diphenhydrAMINE 25 milliGRAM(s) Oral every 4 hours PRN Rash and/or Itching  simethicone 80 milliGRAM(s) Chew every 12 hours PRN Gas  traMADol 50 milliGRAM(s) Oral every 8 hours PRN Moderate Pain (4 - 6)      Vital Signs Last 24 Hrs  T(C): 37.5 (26 Sep 2023 11:17), Max: 37.5 (26 Sep 2023 11:17)  T(F): 99.5 (26 Sep 2023 11:17), Max: 99.5 (26 Sep 2023 11:17)  HR: 84 (26 Sep 2023 11:17) (73 - 87)  BP: 110/66 (26 Sep 2023 11:17) (100/58 - 139/73)  BP(mean): --  RR: 18 (26 Sep 2023 11:17) (17 - 18)  SpO2: 97% (26 Sep 2023 11:17) (97% - 100%)    Parameters below as of 26 Sep 2023 11:17  Patient On (Oxygen Delivery Method): room air      CAPILLARY BLOOD GLUCOSE      POCT Blood Glucose.: 77 mg/dL (26 Sep 2023 11:36)  POCT Blood Glucose.: 157 mg/dL (26 Sep 2023 07:44)  POCT Blood Glucose.: 104 mg/dL (26 Sep 2023 03:14)  POCT Blood Glucose.: 98 mg/dL (26 Sep 2023 02:57)  POCT Blood Glucose.: 82 mg/dL (26 Sep 2023 02:39)  POCT Blood Glucose.: 56 mg/dL (26 Sep 2023 02:15)  POCT Blood Glucose.: 204 mg/dL (25 Sep 2023 21:22)  POCT Blood Glucose.: 206 mg/dL (25 Sep 2023 17:02)    I&O's Summary      PHYSICAL EXAM:  GENERAL: NAD, well-developed  HEAD:  Atraumatic, Normocephalic  EYES: EOMI, PERRLA, conjunctiva and sclera clear  NECK: Supple, No JVD  CHEST/LUNG: Clear to auscultation bilaterally; No wheeze  HEART: Regular rate and rhythm; No murmurs, rubs, or gallops  ABDOMEN: Soft, Nontender, Nondistended; Bowel sounds present  EXTREMITIES:  2+ Peripheral Pulses, No clubbing, cyanosis, or edema  PSYCH: AAOx3  NEUROLOGY: non-focal  SKIN: No rashes or lesions    LABS:                        10.1   19.83 )-----------( 362      ( 26 Sep 2023 07:08 )             35.2     09-25    144  |  105  |  22  ----------------------------<  106<H>  4.2   |  29  |  0.68    Ca    8.8      25 Sep 2023 05:53      PT/INR - ( 26 Sep 2023 07:08 )   PT: 21.8 sec;   INR: 2.12 ratio               Urinalysis Basic - ( 25 Sep 2023 05:53 )    Color: x / Appearance: x / SG: x / pH: x  Gluc: 106 mg/dL / Ketone: x  / Bili: x / Urobili: x   Blood: x / Protein: x / Nitrite: x   Leuk Esterase: x / RBC: x / WBC x   Sq Epi: x / Non Sq Epi: x / Bacteria: x        RADIOLOGY & ADDITIONAL TESTS:    Imaging Personally Reviewed:    Consultant(s) Notes Reviewed:      Care Discussed with Consultants/Other Providers:

## 2023-09-26 NOTE — PROGRESS NOTE ADULT - ASSESSMENT
72 y.o. F with PMH of asthma on chronic steroids, bronchiectasis, allergic rhinitis,  recurrent PNA,  tracheomalacia s/p tracheoplasty, ESBL proteus infections (sputum),  diabetes, paroxysmal A-fib on coumadin, colorectal cancer many years ago status post colostomy, recent hospitalization at an outside hospital  for acute respiratory failure with hypoxia secondary to asthma/COPD exacerbation and bronchopneumonia 6/5/2023 - 6/16/2023), and AVR 2022 with Dr. Cabrera.  Pt admits to chronic SOB and cough for years and is up to date on vaccines. Dr. Allison follows as an outpatient for her COPD. s/p 9/6 TAVR- [facility] with Dr. Gonsalves and Dr. Leahy. discharged home 9/10. Pt presents to office today for f/u visit  w/ c/o of worsening SOB. Pt to be admitted for further workup and eval. Pt stable at this time. VSS; O2 sats 97% RA.  ,H/o  ,bronchiectasis, tracheomalacia s/p tracheoplasty who presents after a recent admission for bronchiectasis exacerbation (6/2023, treated with Ertapenem for ESBL proteus), and again in 9/2023 for an acute flare whose hospital course was complicated by identification of severe AS requiring valve in valve TAVR 9/7/23    bronchiectasis  9/13 Pulm eval /rec Routine sputum culture, urine strep/legionella, RVP, COVID  Considering culture history would start ertapenem and vancomycin for MRSA   Bronchodilator: Standing duonebs q 4 hours, nebulized pulmicort  -  iv steroids solumedrol 20 iv q 8  - vest therapy  - mucous for culture and sensitivity  - nebulized mucomyst  - Ensure she is getting twice per day airway clearance: Albuterol nebulizer followed by saline nebulizer followed by cough assist device and encouragement to cough and clear  - Pulmonary will continue to follow  - steroid taper to oral  as per pulm  - pt refusing prednisone as she has been on methylprednisolone since childhood  - ertapenam per ID - completed 5 days    cough  - Robitussin  - tessalon    S/P TAVR (transcatheter aortic valve replacement).   ·  Plan: Structural Team Following   Continue with Plavix 75 mg PO daily   Continue with Mexiletine 200 mg every 8 hours   Continue with Atorvastatin 20 mg PO HS    Daily EKG   Increase activity as tolerated.   Encourage Chest PT / Pulmonary toileting and Incentive spirometry every 1 hour x 10 while awake.   Continue with PUD and DVT prophylaxis.   Daily Shower     shingles  - valcyte for 7 days      Uncontrolled type 2 diabetes mellitus with hyperglycemia.   ·  Plan: Test BG ac and hs and 2am if eating. Q6H while NPO  -restart Lantus 32 units sq hs   - restart  Admelog 15-16-18 units qac   Hold if NPO or eating less than 50% of meals.  -  moderate correction scale ac meals and moderate correction hs and 2am.      Plan to d/c on basal bolus.   Outpt. endo follow-up. Dr Saavedra  Outpt. optho, podiatry, micro/cr  Consider Freesytle Luis E 3  upon discharge.    Essential hypertension.   ·  Plan: Goal BP <130/80   - cont current meds     Hyperlipidemia.   ·  Plan: LDL goal <70 due to DM  Pt LDL N/A  Order fasting lipid if not recently done  Statin as noted above     F/u levels as out pt.    H/O adrenal insufficiency.   ·  Plan: On chronic steroids at home      Atrial fibrillation.   ·  Plan: Continue with Mexiletine 200 mg every 8 hours   Daily PT/ INR and dose coumadin    dispo  - d/c planning home with home PT  - follow up with pulm Dr Allison

## 2023-09-26 NOTE — PHARMACOTHERAPY INTERVENTION NOTE - COMMENTS
Pharmacy Warfarin Note    HPI/CC:  Patient is a 72y old  Female who presents with a chief complaint of sob (27 Sep 2023 16:25)    HPI:  72F with with PMHF of asthma on chronic steroids, bronchiectasis, allergic rhinitis,  recurrent PNA,  tracheomalacia s/p tracheoplasty, ESBL proteus infections (sputum),  diabetes, paroxysmal A-fib on coumadin, colorectal cancer many years ago status post colostomy, recent hospitalization at an outside hospital  for acute respiratory failure with hypoxia secondary to asthma/COPD exacerbation and bronchopneumonia 6/5/2023 - 6/16/2023), and AVR 2022 with Dr. Cabrera.  Pt admits to chronic SOB and cough for years and is up to date on vaccines. Dr. Allison follows as an outpatient for her COPD. s/p 9/6 TAVR- [facility] with Dr. Gonsalves and Dr. Leahy. discharged home 9/10. Pt presents to office today for f/u visit  w/ c/o of worsening SOB. Pt to be admitted for further workup and eval.     Indication: Afib  Home dose: N/A  Goal INR: 2-3  Bridge Indicated: No      INR history:  INR: 2.51 ratio (09-27-23 @ 11:07)  INR: 2.12 ratio (09-26-23 @ 07:08)  INR: 1.42 ratio (09-25-23 @ 05:54)  INR: 1.07 ratio (09-24-23 @ 06:55)  INR: 1.38 ratio (09-23-23 @ 11:25)    Hemoglobin Trend:  Hemoglobin: 9.7 g/dL (09-27-23 @ 11:07)  Hemoglobin: 10.1 g/dL (09-26-23 @ 07:08)  Hemoglobin: 10.1 g/dL (09-25-23 @ 05:53)  Hemoglobin: 9.9 g/dL (09-24-23 @ 06:55)  Hemoglobin: 10.2 g/dL (09-23-23 @ 11:25)    Platelet Trend:  Platelet Count - Automated: 336 K/uL (09-27-23 @ 11:07)  Platelet Count - Automated: 362 K/uL (09-26-23 @ 07:08)  Platelet Count - Automated: 417 K/uL (09-25-23 @ 05:53)  Platelet Count - Automated: 408 K/uL (09-24-23 @ 06:55)  Platelet Count - Automated: 434 K/uL (09-23-23 @ 11:25)    Warfarin Dose History:  warfarin   2.5 milliGRAM(s) Oral (09-20)    warfarin   6 milliGRAM(s) Oral (09-18)    warfarin   6 milliGRAM(s) Oral (09-17)    warfarin   6 milliGRAM(s) Oral (09-25)    warfarin   2.5 milliGRAM(s) Oral (09-14)    warfarin   2.5 milliGRAM(s) Oral (09-26)    warfarin   2.5 milliGRAM(s) Oral (09-13)    warfarin   5 milliGRAM(s) Oral (09-12)    warfarin   6 milliGRAM(s) Oral (09-24)    warfarin   5 milliGRAM(s) Oral (09-15)    warfarin   5 milliGRAM(s) Oral (09-23)    warfarin   6 milliGRAM(s) Oral (09-19)    warfarin   5 milliGRAM(s) Oral (09-16)      Recommendations:  - Patient is currently ordered for warfarin 5mg.  Patient was restarted on warfarin 9/23 at 5mg, increased to 6mg on 9/24. INR increased from 1.42 to 2.12 (0.7 difference), likely reflective of the 5mg dose. Recommend to change to warfarin 2.5mg for tonight.   - Please obtain daily INR while inpatient stay    Dorita Francisco, PharmD  PGY1 Pharmacy Resident  Available on Teams & [phone]

## 2023-09-26 NOTE — PROGRESS NOTE ADULT - SUBJECTIVE AND OBJECTIVE BOX
DIABETES FOLLOW UP NOTE: Saw pt earlier today    Chief Complaint: Endocrine consult requested for management of T2DM    INTERVAL HX: Pt stable, reports tolerating POs but stated she didn't eat breakfast this morning with BG of 77 ac lunch>Pt states, she new it was going to be low because she received insulin for it but didn't feel like eating. Also hypoglycemic overnight even though Lantus insulin dose was decreased last night and pt didn't eat snack. On Methylprednisolone PO 40mg daily x 5 days (day 1) coming down slowly every 5 days to 16mg/day. No hypoglycemia. Still having cough and SOB at exertion.       Review of Systems:  General: As above  Cardiovascular: No chest pain, palpitations  Respiratory: + SOB, + cough  GI: No nausea, vomiting, abdominal pain  Endocrine: no polyuria, polydipsia or S&Sx of hypoglycemia    Allergies    tetanus toxoid (Short breath)  Dilaudid (Short breath)  codeine (Short breath)  iodine (Short breath; Swelling)  Avelox (Short breath; Pruritus)  shellfish (Anaphylaxis)  cefepime (Anaphylaxis)  aspirin (Short breath)  Valium (Short breath)  penicillin (Anaphylaxis)    Intolerances      MEDICATIONS:  atorvastatin 20 milliGRAM(s) Oral at bedtime  insulin glargine Injectable (LANTUS) 28 Unit(s) SubCutaneous at bedtime  insulin lispro (ADMELOG) corrective regimen sliding scale   SubCutaneous three times a day before meals  insulin lispro (ADMELOG) corrective regimen sliding scale   SubCutaneous <User Schedule>  insulin lispro Injectable (ADMELOG) 14 Unit(s) SubCutaneous before dinner  insulin lispro Injectable (ADMELOG) 4 Unit(s) SubCutaneous at bedtime  insulin lispro Injectable (ADMELOG) 14 Unit(s) SubCutaneous before lunch  insulin lispro Injectable (ADMELOG) 16 Unit(s) SubCutaneous before breakfast  methylPREDNISolone 40 milliGRAM(s) Oral daily      PHYSICAL EXAM:  VITALS: T(C): 37.5 (09-26-23 @ 11:17)  T(F): 99.5 (09-26-23 @ 11:17), Max: 99.5 (09-26-23 @ 11:17)  HR: 84 (09-26-23 @ 13:42) (84 - 87)  BP: 132/77 (09-26-23 @ 13:42) (100/58 - 139/73)  RR:  (17 - 18)  SpO2:  (97% - 100%)  Wt(kg): --  GENERAL: Female sitting in chair in NAD  Abdomen: Soft, nontender, non distended, central adiposity.   Extremities: Warm, no edema in all 4 exts  NEURO: A&O X3    LABS:  POCT Blood Glucose.: 77 mg/dL (09-26-23 @ 11:36)  POCT Blood Glucose.: 157 mg/dL (09-26-23 @ 07:44) No breakfast  POCT Blood Glucose.: 104 mg/dL (09-26-23 @ 03:14)  POCT Blood Glucose.: 98 mg/dL (09-26-23 @ 02:57)  POCT Blood Glucose.: 82 mg/dL (09-26-23 @ 02:39)  POCT Blood Glucose.: 56 mg/dL (09-26-23 @ 02:15)  POCT Blood Glucose.: 204 mg/dL (09-25-23 @ 21:22)  POCT Blood Glucose.: 206 mg/dL (09-25-23 @ 17:02)  POCT Blood Glucose.: 128 mg/dL (09-25-23 @ 11:45)  POCT Blood Glucose.: 93 mg/dL (09-25-23 @ 07:50)  POCT Blood Glucose.: 204 mg/dL (09-25-23 @ 02:12)  POCT Blood Glucose.: 201 mg/dL (09-24-23 @ 21:03)  POCT Blood Glucose.: 146 mg/dL (09-24-23 @ 17:06)  POCT Blood Glucose.: 129 mg/dL (09-24-23 @ 11:52)  POCT Blood Glucose.: 256 mg/dL (09-24-23 @ 08:41)  POCT Blood Glucose.: 207 mg/dL (09-24-23 @ 01:54)  POCT Blood Glucose.: 160 mg/dL (09-23-23 @ 20:54)  POCT Blood Glucose.: 90 mg/dL (09-23-23 @ 17:25)                            10.1   19.83 )-----------( 362      ( 26 Sep 2023 07:08 )             35.2       09-25    144  |  105  |  22  ----------------------------<  106<H>  4.2   |  29  |  0.68    eGFR: 91    Ca    8.8      09-25      Thyroid Function Tests:  09-06 @ 09:32 TSH 0.49 FreeT4 1.2 T3 82 Anti TPO -- Anti Thyroglobulin Ab -- TSI --      A1C with Estimated Average Glucose Result: 5.7 % (09-08-23 @ 06:39)      Estimated Average Glucose: 117 mg/dL (09-08-23 @ 06:39)

## 2023-09-27 LAB
GLUCOSE BLDC GLUCOMTR-MCNC: 128 MG/DL — HIGH (ref 70–99)
GLUCOSE BLDC GLUCOMTR-MCNC: 177 MG/DL — HIGH (ref 70–99)
GLUCOSE BLDC GLUCOMTR-MCNC: 333 MG/DL — HIGH (ref 70–99)
GLUCOSE BLDC GLUCOMTR-MCNC: 450 MG/DL — CRITICAL HIGH (ref 70–99)
GLUCOSE BLDC GLUCOMTR-MCNC: 66 MG/DL — LOW (ref 70–99)
GLUCOSE BLDC GLUCOMTR-MCNC: 72 MG/DL — SIGNIFICANT CHANGE UP (ref 70–99)
HCT VFR BLD CALC: 33.1 % — LOW (ref 34.5–45)
HGB BLD-MCNC: 9.7 G/DL — LOW (ref 11.5–15.5)
INR BLD: 2.51 RATIO — HIGH (ref 0.85–1.18)
MCHC RBC-ENTMCNC: 22.7 PG — LOW (ref 27–34)
MCHC RBC-ENTMCNC: 29.3 GM/DL — LOW (ref 32–36)
MCV RBC AUTO: 77.5 FL — LOW (ref 80–100)
NRBC # BLD: 0 /100 WBCS — SIGNIFICANT CHANGE UP (ref 0–0)
PLATELET # BLD AUTO: 336 K/UL — SIGNIFICANT CHANGE UP (ref 150–400)
PROTHROM AB SERPL-ACNC: 26.9 SEC — HIGH (ref 9.5–13)
RAPID RVP RESULT: DETECTED
RBC # BLD: 4.27 M/UL — SIGNIFICANT CHANGE UP (ref 3.8–5.2)
RBC # FLD: 23.9 % — HIGH (ref 10.3–14.5)
SARS-COV-2 RNA SPEC QL NAA+PROBE: DETECTED
WBC # BLD: 25.12 K/UL — HIGH (ref 3.8–10.5)
WBC # FLD AUTO: 25.12 K/UL — HIGH (ref 3.8–10.5)

## 2023-09-27 PROCEDURE — 99232 SBSQ HOSP IP/OBS MODERATE 35: CPT

## 2023-09-27 RX ORDER — SOD,AMMONIUM,POTASSIUM LACTATE
1 CREAM (GRAM) TOPICAL ONCE
Refills: 0 | Status: COMPLETED | OUTPATIENT
Start: 2023-09-27 | End: 2023-09-27

## 2023-09-27 RX ORDER — REMDESIVIR 5 MG/ML
200 INJECTION INTRAVENOUS EVERY 24 HOURS
Refills: 0 | Status: COMPLETED | OUTPATIENT
Start: 2023-09-27 | End: 2023-09-27

## 2023-09-27 RX ORDER — INSULIN GLARGINE 100 [IU]/ML
22 INJECTION, SOLUTION SUBCUTANEOUS AT BEDTIME
Refills: 0 | Status: DISCONTINUED | OUTPATIENT
Start: 2023-09-27 | End: 2023-09-28

## 2023-09-27 RX ORDER — REMDESIVIR 5 MG/ML
100 INJECTION INTRAVENOUS EVERY 24 HOURS
Refills: 0 | Status: DISCONTINUED | OUTPATIENT
Start: 2023-09-28 | End: 2023-10-01

## 2023-09-27 RX ORDER — REMDESIVIR 5 MG/ML
INJECTION INTRAVENOUS
Refills: 0 | Status: DISCONTINUED | OUTPATIENT
Start: 2023-09-27 | End: 2023-10-01

## 2023-09-27 RX ORDER — WARFARIN SODIUM 2.5 MG/1
5 TABLET ORAL ONCE
Refills: 0 | Status: COMPLETED | OUTPATIENT
Start: 2023-09-27 | End: 2023-09-27

## 2023-09-27 RX ORDER — INSULIN LISPRO 100/ML
12 VIAL (ML) SUBCUTANEOUS
Refills: 0 | Status: DISCONTINUED | OUTPATIENT
Start: 2023-09-28 | End: 2023-10-01

## 2023-09-27 RX ORDER — INSULIN LISPRO 100/ML
10 VIAL (ML) SUBCUTANEOUS
Refills: 0 | Status: DISCONTINUED | OUTPATIENT
Start: 2023-09-28 | End: 2023-09-28

## 2023-09-27 RX ADMIN — Medication 12 UNIT(S): at 17:14

## 2023-09-27 RX ADMIN — TIOTROPIUM BROMIDE 2 PUFF(S): 18 CAPSULE ORAL; RESPIRATORY (INHALATION) at 11:58

## 2023-09-27 RX ADMIN — Medication 200 MILLIGRAM(S): at 17:20

## 2023-09-27 RX ADMIN — Medication 3 MILLILITER(S): at 22:18

## 2023-09-27 RX ADMIN — Medication 1 APPLICATION(S): at 18:37

## 2023-09-27 RX ADMIN — POLYETHYLENE GLYCOL 3350 17 GRAM(S): 17 POWDER, FOR SOLUTION ORAL at 06:06

## 2023-09-27 RX ADMIN — CHLORHEXIDINE GLUCONATE 1 APPLICATION(S): 213 SOLUTION TOPICAL at 06:05

## 2023-09-27 RX ADMIN — SENNA PLUS 2 TABLET(S): 8.6 TABLET ORAL at 22:19

## 2023-09-27 RX ADMIN — Medication 600 MILLIGRAM(S): at 17:16

## 2023-09-27 RX ADMIN — Medication 3 MILLILITER(S): at 06:02

## 2023-09-27 RX ADMIN — BUDESONIDE AND FORMOTEROL FUMARATE DIHYDRATE 2 PUFF(S): 160; 4.5 AEROSOL RESPIRATORY (INHALATION) at 17:15

## 2023-09-27 RX ADMIN — Medication 3 MILLILITER(S): at 11:57

## 2023-09-27 RX ADMIN — Medication 4: at 22:13

## 2023-09-27 RX ADMIN — Medication 200 MILLIGRAM(S): at 11:57

## 2023-09-27 RX ADMIN — WARFARIN SODIUM 5 MILLIGRAM(S): 2.5 TABLET ORAL at 22:22

## 2023-09-27 RX ADMIN — Medication 500 MILLIGRAM(S): at 11:59

## 2023-09-27 RX ADMIN — Medication 100 MILLIGRAM(S): at 06:04

## 2023-09-27 RX ADMIN — REMDESIVIR 200 MILLIGRAM(S): 5 INJECTION INTRAVENOUS at 18:44

## 2023-09-27 RX ADMIN — Medication 40 MILLIGRAM(S): at 06:04

## 2023-09-27 RX ADMIN — Medication 650 MILLIGRAM(S): at 06:05

## 2023-09-27 RX ADMIN — INSULIN GLARGINE 22 UNIT(S): 100 INJECTION, SOLUTION SUBCUTANEOUS at 22:13

## 2023-09-27 RX ADMIN — MEXILETINE HYDROCHLORIDE 200 MILLIGRAM(S): 150 CAPSULE ORAL at 22:14

## 2023-09-27 RX ADMIN — POLYETHYLENE GLYCOL 3350 17 GRAM(S): 17 POWDER, FOR SOLUTION ORAL at 17:17

## 2023-09-27 RX ADMIN — Medication 200 MILLIGRAM(S): at 06:03

## 2023-09-27 RX ADMIN — CLOPIDOGREL BISULFATE 75 MILLIGRAM(S): 75 TABLET, FILM COATED ORAL at 12:03

## 2023-09-27 RX ADMIN — Medication 100 MILLIGRAM(S): at 22:16

## 2023-09-27 RX ADMIN — Medication 2 MILLILITER(S): at 22:15

## 2023-09-27 RX ADMIN — Medication 2 MILLILITER(S): at 13:22

## 2023-09-27 RX ADMIN — ATORVASTATIN CALCIUM 20 MILLIGRAM(S): 80 TABLET, FILM COATED ORAL at 22:17

## 2023-09-27 RX ADMIN — Medication 600 MILLIGRAM(S): at 06:05

## 2023-09-27 RX ADMIN — Medication 100 MILLIGRAM(S): at 13:22

## 2023-09-27 RX ADMIN — MEXILETINE HYDROCHLORIDE 200 MILLIGRAM(S): 150 CAPSULE ORAL at 06:03

## 2023-09-27 RX ADMIN — GABAPENTIN 100 MILLIGRAM(S): 400 CAPSULE ORAL at 06:04

## 2023-09-27 RX ADMIN — Medication 12: at 17:14

## 2023-09-27 RX ADMIN — Medication 3 MILLILITER(S): at 17:15

## 2023-09-27 RX ADMIN — GABAPENTIN 100 MILLIGRAM(S): 400 CAPSULE ORAL at 13:24

## 2023-09-27 RX ADMIN — GABAPENTIN 100 MILLIGRAM(S): 400 CAPSULE ORAL at 22:15

## 2023-09-27 RX ADMIN — BUDESONIDE AND FORMOTEROL FUMARATE DIHYDRATE 2 PUFF(S): 160; 4.5 AEROSOL RESPIRATORY (INHALATION) at 06:06

## 2023-09-27 RX ADMIN — PANTOPRAZOLE SODIUM 40 MILLIGRAM(S): 20 TABLET, DELAYED RELEASE ORAL at 11:59

## 2023-09-27 RX ADMIN — Medication 16 UNIT(S): at 08:28

## 2023-09-27 RX ADMIN — Medication 1 TABLET(S): at 11:59

## 2023-09-27 RX ADMIN — Medication 2 MILLILITER(S): at 06:06

## 2023-09-27 RX ADMIN — MEXILETINE HYDROCHLORIDE 200 MILLIGRAM(S): 150 CAPSULE ORAL at 13:23

## 2023-09-27 NOTE — PROGRESS NOTE ADULT - SUBJECTIVE AND OBJECTIVE BOX
infectious diseases progress note:    Patient is a 72y old  Female who presents with a chief complaint of sob (26 Sep 2023 18:20)        Heart failure             Allergies    tetanus toxoid (Short breath)  Dilaudid (Short breath)  codeine (Short breath)  iodine (Short breath; Swelling)  Avelox (Short breath; Pruritus)  shellfish (Anaphylaxis)  cefepime (Anaphylaxis)  aspirin (Short breath)  Valium (Short breath)  penicillin (Anaphylaxis)    Intolerances        ANTIBIOTICS/RELEVANT:  antimicrobials    immunologic:    OTHER:  acetaminophen     Tablet .. 650 milliGRAM(s) Oral every 6 hours PRN  acetylcysteine 10%  Inhalation 2 milliLiter(s) Inhalation every 8 hours  albuterol/ipratropium for Nebulization 3 milliLiter(s) Nebulizer every 6 hours  ammonium lactate 12% Lotion 1 Application(s) Topical once  ascorbic acid 500 milliGRAM(s) Oral daily  atorvastatin 20 milliGRAM(s) Oral at bedtime  benzonatate 100 milliGRAM(s) Oral three times a day  budesonide  80 MICROgram(s)/formoterol 4.5 MICROgram(s) Inhaler 2 Puff(s) Inhalation two times a day  chlorhexidine 2% Cloths 1 Application(s) Topical <User Schedule>  clopidogrel Tablet 75 milliGRAM(s) Oral daily  dextrose 5%. 1000 milliLiter(s) IV Continuous <Continuous>  dextrose 5%. 1000 milliLiter(s) IV Continuous <Continuous>  dextrose 50% Injectable 25 Gram(s) IV Push once  dextrose 50% Injectable 12.5 Gram(s) IV Push once  dextrose 50% Injectable 25 Gram(s) IV Push once  dextrose Oral Gel 15 Gram(s) Oral once PRN  diphenhydrAMINE 25 milliGRAM(s) Oral every 4 hours PRN  gabapentin 100 milliGRAM(s) Oral every 8 hours  glucagon  Injectable 1 milliGRAM(s) IntraMuscular once  guaiFENesin  milliGRAM(s) Oral every 12 hours  guaiFENesin Oral Liquid (Sugar-Free) 200 milliGRAM(s) Oral every 6 hours  hydrocortisone 1% Cream 1 Application(s) Topical two times a day  insulin glargine Injectable (LANTUS) 28 Unit(s) SubCutaneous at bedtime  insulin lispro (ADMELOG) corrective regimen sliding scale   SubCutaneous three times a day before meals  insulin lispro (ADMELOG) corrective regimen sliding scale   SubCutaneous <User Schedule>  insulin lispro Injectable (ADMELOG) 12 Unit(s) SubCutaneous before lunch  insulin lispro Injectable (ADMELOG) 12 Unit(s) SubCutaneous before dinner  insulin lispro Injectable (ADMELOG) 16 Unit(s) SubCutaneous before breakfast  insulin lispro Injectable (ADMELOG) 4 Unit(s) SubCutaneous at bedtime  lidocaine   4% Patch 2 Patch Transdermal daily  methylPREDNISolone 40 milliGRAM(s) Oral daily  mexiletine 200 milliGRAM(s) Oral every 8 hours  multivitamin 1 Tablet(s) Oral daily  pantoprazole    Tablet 40 milliGRAM(s) Oral daily  polyethylene glycol 3350 17 Gram(s) Oral two times a day  senna 2 Tablet(s) Oral at bedtime  simethicone 80 milliGRAM(s) Chew every 12 hours PRN  tiotropium 2.5 MICROgram(s) Inhaler 2 Puff(s) Inhalation daily  traMADol 50 milliGRAM(s) Oral every 8 hours PRN  warfarin 5 milliGRAM(s) Oral once      Objective:  Vital Signs Last 24 Hrs  T(C): 36.9 (27 Sep 2023 12:32), Max: 38.2 (27 Sep 2023 04:37)  T(F): 98.5 (27 Sep 2023 12:32), Max: 100.7 (27 Sep 2023 04:37)  HR: 94 (27 Sep 2023 04:37) (76 - 94)  BP: 115/73 (27 Sep 2023 04:37) (115/73 - 160/77)  BP(mean): --  RR: 18 (27 Sep 2023 04:37) (18 - 18)  SpO2: 96% (27 Sep 2023 04:37) (96% - 98%)    Parameters below as of 27 Sep 2023 04:37  Patient On (Oxygen Delivery Method): room air        PHYSICAL EXAM:  Constitutional:Well-developed, well nourished--no acute distress  Eyes:EBER, EOMI  Ear/Nose/Throat: no oral lesion, no sinus tenderness on percussion	  Neck:no JVD, no lymphadenopathy, supple  Respiratory: CTA barry  Cardiovascular: S1S2 RRR, no murmurs  Gastrointestinal:soft, (+) BS, no HSM  Extremities:no e/e/c        LABS:                        9.7    25.12 )-----------( 336      ( 27 Sep 2023 11:07 )             33.1           PT/INR - ( 27 Sep 2023 11:07 )   PT: 26.9 sec;   INR: 2.51 ratio                 MICROBIOLOGY:    RECENT CULTURES:        RESPIRATORY CULTURES:              RADIOLOGY & ADDITIONAL STUDIES:        Pager [phone]  After 5 pm/weekends or if no response :[phone]

## 2023-09-27 NOTE — ADVANCED PRACTICE NURSE CONSULT - REASON FOR CONSULT
Bedside midline order placed.   Indication: access  
Consult to assess patient skin initiated by RN multiple skin injury present on admission     History of Present Illness:  Reason for Admission: sob  History of Present Illness:   72F with with PMHF of asthma on chronic steroids, bronchiectasis, allergic rhinitis,  recurrent PNA,  tracheomalacia s/p tracheoplasty, ESBL proteus infections (sputum),  diabetes, paroxysmal A-fib on coumadin, colorectal cancer many years ago status post colostomy, recent hospitalization at an outside hospital  for acute respiratory failure with hypoxia secondary to asthma/COPD exacerbation and bronchopneumonia 6/5/2023 - 6/16/2023), and AVR 2022 with Dr. Cabrera.  Pt admits to chronic SOB and cough for years and is up to date on vaccines. Dr. Allison follows as an outpatient for her COPD. s/p 9/6 TAVR- [facility] with Dr. Gonsalves and Dr. Leahy. discharged home 9/10. Pt presents to office today for f/u visit  w/ c/o of worsening SOB. Pt to be admitted for further workup and eval. Pt stable at this time. VSS; O2 sats 97% RA.

## 2023-09-27 NOTE — PROGRESS NOTE ADULT - ASSESSMENT
72 y.o. F with PMH of asthma on chronic steroids, bronchiectasis, allergic rhinitis,  recurrent PNA,  tracheomalacia s/p tracheoplasty, ESBL proteus infections (sputum),  diabetes, paroxysmal A-fib on coumadin, colorectal cancer many years ago status post colostomy, recent hospitalization at an outside hospital  for acute respiratory failure with hypoxia secondary to asthma/COPD exacerbation and bronchopneumonia 6/5/2023 - 6/16/2023), and AVR 2022 with Dr. Cabrera.  Pt admits to chronic SOB and cough for years and is up to date on vaccines. Dr. Allison follows as an outpatient for her COPD. s/p 9/6 TAVR- [facility] with Dr. Gonsalves and Dr. Leahy. discharged home 9/10. Pt presents to office today for f/u visit  w/ c/o of worsening SOB. Pt to be admitted for further workup and eval. Pt stable at this time. VSS; O2 sats 97% RA.  ,H/o  ,bronchiectasis, tracheomalacia s/p tracheoplasty who presents after a recent admission for bronchiectasis exacerbation (6/2023, treated with Ertapenem for ESBL proteus), and again in 9/2023 for an acute flare whose hospital course was complicated by identification of severe AS requiring valve in valve TAVR 9/7/23    fever and elevated WBC count  - seen by ID  - covid test  - chest ct scan  - bc urine culture  - start Invanz again pending work up     bronchiectasis  9/13 Pulm eval /rec Routine sputum culture, urine strep/legionella, RVP, COVID  Considering culture history would start ertapenem and vancomycin for MRSA   Bronchodilator: Standing duonebs q 4 hours, nebulized pulmicort  - vest therapy  - mucous for culture and sensitivity done  - nebulized mucomyst  - Ensure she is getting twice per day airway clearance: Albuterol nebulizer followed by saline nebulizer followed by cough assist device and encouragement to cough and clear  - steroid taper to oral  as per pulm  - pt refusing prednisone as she has been on methylprednisolone since childhood  - ertapenam per ID - completed 5 days    cough  - Robitussin  - tessalon    S/P TAVR (transcatheter aortic valve replacement).   ·  Plan: Structural Team Following   Continue with Plavix 75 mg PO daily   Continue with Mexiletine 200 mg every 8 hours   Continue with Atorvastatin 20 mg PO HS    Daily EKG   Increase activity as tolerated.   Encourage Chest PT / Pulmonary toileting and Incentive spirometry every 1 hour x 10 while awake.   Continue with PUD and DVT prophylaxis.   Daily Shower     shingles  - valcyte for 7 days      Uncontrolled type 2 diabetes mellitus with hyperglycemia.   ·  Plan: Test BG ac and hs and 2am if eating. Q6H while NPO  -restart Lantus 32 units sq hs   - restart  Admelog 15-16-18 units qac   Hold if NPO or eating less than 50% of meals.  -  moderate correction scale ac meals and moderate correction hs and 2am.      Plan to d/c on basal bolus.   Outpt. endo follow-up. Dr Saavedra  Outpt. optho, podiatry, micro/cr  Consider Freesytle Luis E 3  upon discharge.    Essential hypertension.   ·  Plan: Goal BP <130/80   - cont current meds     Hyperlipidemia.   ·  Plan: LDL goal <70 due to DM  Pt LDL N/A  Order fasting lipid if not recently done  Statin as noted above     F/u levels as out pt.    H/O adrenal insufficiency.   ·  Plan: On chronic steroids at home      Atrial fibrillation.   ·  Plan: Continue with Mexiletine 200 mg every 8 hours   Daily PT/ INR and dose coumadin    dispo  - d/c planning home with home PT when stable  - follow up with pulm Dr Allison       patient

## 2023-09-27 NOTE — PROGRESS NOTE ADULT - SUBJECTIVE AND OBJECTIVE BOX
DIABETES FOLLOW UP NOTE: Saw pt earlier today    Chief Complaint: Endocrine consult requested for management of T2DM    INTERVAL HX: Pt stable, reports tolerating POs and now eating every meal. C/o productine cough and SOB. Also febrile with leukocytosis > planed discharge for today was cancelled. Pt received premeal insulin with breakfast this am with BG of 66-72 ac lunch. On Methylprednisolone PO 40mg daily x 5 days (day 2) coming down slowly every 5 days to 16mg/day. No hypoglycemia. Still having cough and SOB at exertion. Noted pt now tested + for covid.     Review of Systems:  General: As above  Cardiovascular: No chest pain, palpitations  Respiratory: No SOB, no cough  GI: No nausea, vomiting, abdominal pain  Endocrine: No polyuria, polydipsia or S&Sx of hypoglycemia    Allergies    tetanus toxoid (Short breath)  Dilaudid (Short breath)  codeine (Short breath)  iodine (Short breath; Swelling)  Avelox (Short breath; Pruritus)  shellfish (Anaphylaxis)  cefepime (Anaphylaxis)  aspirin (Short breath)  Valium (Short breath)  penicillin (Anaphylaxis)    Intolerances      MEDICATIONS:  atorvastatin 20 milliGRAM(s) Oral at bedtime  insulin glargine Injectable (LANTUS) 28 Unit(s) SubCutaneous at bedtime  insulin lispro (ADMELOG) corrective regimen sliding scale   SubCutaneous three times a day before meals  insulin lispro (ADMELOG) corrective regimen sliding scale   SubCutaneous <User Schedule>  insulin lispro Injectable (ADMELOG) 12 Unit(s) SubCutaneous before lunch  insulin lispro Injectable (ADMELOG) 4 Unit(s) SubCutaneous at bedtime  insulin lispro Injectable (ADMELOG) 12 Unit(s) SubCutaneous before dinner  insulin lispro Injectable (ADMELOG) 16 Unit(s) SubCutaneous before breakfast  methylPREDNISolone 40 milliGRAM(s) Oral daily    PHYSICAL EXAM:  VITALS: T(C): 36.9 (09-27-23 @ 12:32)  T(F): 98.5 (09-27-23 @ 12:32), Max: 100.7 (09-27-23 @ 04:37)  HR: 81 (09-27-23 @ 13:20) (76 - 94)  BP: 104/64 (09-27-23 @ 13:20) (104/64 - 160/77)  RR:  (18 - 18)  SpO2:  (96% - 98%)  Wt(kg): --  GENERAL: Female sitting in chair in NAD  Abdomen: Soft, nontender, non distended, central adiposity.   Extremities: Warm, no edema in all 4 exts  NEURO: A&O X3      LABS:  POCT Blood Glucose.: 72 mg/dL (09-27-23 @ 11:38)  POCT Blood Glucose.: 66 mg/dL (09-27-23 @ 11:36)  POCT Blood Glucose.: 128 mg/dL (09-27-23 @ 07:50)  POCT Blood Glucose.: 177 mg/dL (09-27-23 @ 01:59)  POCT Blood Glucose.: 205 mg/dL (09-26-23 @ 21:10)  POCT Blood Glucose.: 90 mg/dL (09-26-23 @ 17:27)  POCT Blood Glucose.: 77 mg/dL (09-26-23 @ 11:36)  POCT Blood Glucose.: 157 mg/dL (09-26-23 @ 07:44)  POCT Blood Glucose.: 104 mg/dL (09-26-23 @ 03:14)  POCT Blood Glucose.: 98 mg/dL (09-26-23 @ 02:57)  POCT Blood Glucose.: 82 mg/dL (09-26-23 @ 02:39)  POCT Blood Glucose.: 56 mg/dL (09-26-23 @ 02:15)  POCT Blood Glucose.: 204 mg/dL (09-25-23 @ 21:22)  POCT Blood Glucose.: 206 mg/dL (09-25-23 @ 17:02)  POCT Blood Glucose.: 128 mg/dL (09-25-23 @ 11:45)  POCT Blood Glucose.: 93 mg/dL (09-25-23 @ 07:50)  POCT Blood Glucose.: 204 mg/dL (09-25-23 @ 02:12)  POCT Blood Glucose.: 201 mg/dL (09-24-23 @ 21:03)  POCT Blood Glucose.: 146 mg/dL (09-24-23 @ 17:06)                            9.7    25.12 )-----------( 336      ( 27 Sep 2023 11:07 )             33.1       09-25    144  |  105  |  22  ----------------------------<  106<H>  4.2   |  29  |  0.68    eGFR: 91    Ca    8.8      09-25    Thyroid Function Tests:  09-06 @ 09:32 TSH 0.49 FreeT4 1.2 T3 82 Anti TPO -- Anti Thyroglobulin Ab -- TSI --      A1C with Estimated Average Glucose Result: 5.7 % (09-08-23 @ 06:39)      Estimated Average Glucose: 117 mg/dL (09-08-23 @ 06:39)

## 2023-09-27 NOTE — PROGRESS NOTE ADULT - SUBJECTIVE AND OBJECTIVE BOX
DATE OF SERVICE: 09-27-23 @ 15:06    Patient is a 72y old  Female who presents with a chief complaint of sob (27 Sep 2023 12:43)      SUBJECTIVE / OVERNIGHT EVENTS:  coughing     MEDICATIONS  (STANDING):  acetylcysteine 10%  Inhalation 2 milliLiter(s) Inhalation every 8 hours  albuterol/ipratropium for Nebulization 3 milliLiter(s) Nebulizer every 6 hours  ammonium lactate 12% Lotion 1 Application(s) Topical once  ascorbic acid 500 milliGRAM(s) Oral daily  atorvastatin 20 milliGRAM(s) Oral at bedtime  benzonatate 100 milliGRAM(s) Oral three times a day  budesonide  80 MICROgram(s)/formoterol 4.5 MICROgram(s) Inhaler 2 Puff(s) Inhalation two times a day  chlorhexidine 2% Cloths 1 Application(s) Topical <User Schedule>  clopidogrel Tablet 75 milliGRAM(s) Oral daily  dextrose 5%. 1000 milliLiter(s) (50 mL/Hr) IV Continuous <Continuous>  dextrose 5%. 1000 milliLiter(s) (100 mL/Hr) IV Continuous <Continuous>  dextrose 50% Injectable 25 Gram(s) IV Push once  dextrose 50% Injectable 12.5 Gram(s) IV Push once  dextrose 50% Injectable 25 Gram(s) IV Push once  gabapentin 100 milliGRAM(s) Oral every 8 hours  glucagon  Injectable 1 milliGRAM(s) IntraMuscular once  guaiFENesin  milliGRAM(s) Oral every 12 hours  guaiFENesin Oral Liquid (Sugar-Free) 200 milliGRAM(s) Oral every 6 hours  hydrocortisone 1% Cream 1 Application(s) Topical two times a day  insulin glargine Injectable (LANTUS) 28 Unit(s) SubCutaneous at bedtime  insulin lispro (ADMELOG) corrective regimen sliding scale   SubCutaneous three times a day before meals  insulin lispro (ADMELOG) corrective regimen sliding scale   SubCutaneous <User Schedule>  insulin lispro Injectable (ADMELOG) 12 Unit(s) SubCutaneous before lunch  insulin lispro Injectable (ADMELOG) 12 Unit(s) SubCutaneous before dinner  insulin lispro Injectable (ADMELOG) 4 Unit(s) SubCutaneous at bedtime  insulin lispro Injectable (ADMELOG) 16 Unit(s) SubCutaneous before breakfast  lidocaine   4% Patch 2 Patch Transdermal daily  methylPREDNISolone 40 milliGRAM(s) Oral daily  mexiletine 200 milliGRAM(s) Oral every 8 hours  multivitamin 1 Tablet(s) Oral daily  pantoprazole    Tablet 40 milliGRAM(s) Oral daily  polyethylene glycol 3350 17 Gram(s) Oral two times a day  senna 2 Tablet(s) Oral at bedtime  tiotropium 2.5 MICROgram(s) Inhaler 2 Puff(s) Inhalation daily  warfarin 5 milliGRAM(s) Oral once    MEDICATIONS  (PRN):  acetaminophen     Tablet .. 650 milliGRAM(s) Oral every 6 hours PRN Temp greater or equal to 38C (100.4F), Mild Pain (1 - 3)  dextrose Oral Gel 15 Gram(s) Oral once PRN Blood Glucose LESS THAN 70 milliGRAM(s)/deciliter  diphenhydrAMINE 25 milliGRAM(s) Oral every 4 hours PRN Rash and/or Itching  simethicone 80 milliGRAM(s) Chew every 12 hours PRN Gas  traMADol 50 milliGRAM(s) Oral every 8 hours PRN Moderate Pain (4 - 6)      Vital Signs Last 24 Hrs  T(C): 36.9 (27 Sep 2023 12:32), Max: 38.2 (27 Sep 2023 04:37)  T(F): 98.5 (27 Sep 2023 12:32), Max: 100.7 (27 Sep 2023 04:37)  HR: 81 (27 Sep 2023 13:20) (76 - 94)  BP: 104/64 (27 Sep 2023 13:20) (104/64 - 160/77)  BP(mean): --  RR: 18 (27 Sep 2023 04:37) (18 - 18)  SpO2: 96% (27 Sep 2023 04:37) (96% - 98%)    Parameters below as of 27 Sep 2023 04:37  Patient On (Oxygen Delivery Method): room air      CAPILLARY BLOOD GLUCOSE      POCT Blood Glucose.: 72 mg/dL (27 Sep 2023 11:38)  POCT Blood Glucose.: 66 mg/dL (27 Sep 2023 11:36)  POCT Blood Glucose.: 128 mg/dL (27 Sep 2023 07:50)  POCT Blood Glucose.: 177 mg/dL (27 Sep 2023 01:59)  POCT Blood Glucose.: 205 mg/dL (26 Sep 2023 21:10)  POCT Blood Glucose.: 90 mg/dL (26 Sep 2023 17:27)    I&O's Summary    26 Sep 2023 07:01  -  27 Sep 2023 07:00  --------------------------------------------------------  IN: 500 mL / OUT: 0 mL / NET: 500 mL    27 Sep 2023 07:01  -  27 Sep 2023 15:07  --------------------------------------------------------  IN: 480 mL / OUT: 0 mL / NET: 480 mL        PHYSICAL EXAM:  GENERAL: NAD, well-developed  HEAD:  Atraumatic, Normocephalic  EYES: EOMI, PERRLA, conjunctiva and sclera clear  NECK: Supple, No JVD  CHEST/LUNG: barry rhonchi  HEART: Regular rate and rhythm; No murmurs, rubs, or gallops  ABDOMEN: Soft, Nontender, Nondistended; Bowel sounds present  EXTREMITIES:  2+ Peripheral Pulses, No clubbing, cyanosis, or edema  PSYCH: AAOx3  NEUROLOGY: non-focal  SKIN: No rashes or lesions    LABS:                        9.7    25.12 )-----------( 336      ( 27 Sep 2023 11:07 )             33.1           PT/INR - ( 27 Sep 2023 11:07 )   PT: 26.9 sec;   INR: 2.51 ratio                   RADIOLOGY & ADDITIONAL TESTS:    Imaging Personally Reviewed:    Consultant(s) Notes Reviewed:      Care Discussed with Consultants/Other Providers:

## 2023-09-27 NOTE — PROGRESS NOTE ADULT - PROBLEM SELECTOR PLAN 4
Started on slow steroid taper to goal dose of Methylprednisolone 16mg /day.   Please inform team if pt having surgery or becomes critically ill because she would needs stress steroid doses.  F/u as out pt. Started on slow steroid taper to goal dose of Methylprednisolone 16mg /day.   Please inform team if pt having surgery or becomes critically ill because she would needs stress steroid doses.  F/u as out pt.  Discussed case with Dr Chavez about acute illness> per MD, pt already getting stress dose steroids so no need to adjust doses at this time. methylpred 40 mg daily until 09/30 is equivalent to Hydrocortisone 200 mg daily and that is good enough for stress dose.

## 2023-09-27 NOTE — ADVANCED PRACTICE NURSE CONSULT - ASSESSMENT
Midline Catheter Insertion Note    Catheter type: 4F  : Bard  Power injectable: Yes  LOT# [id number]                                                                                                                                                                                                                       Procedure assisted by: HONEY Sales RN  Time out was preformed, confirming the patient's first and last name, date of birth, procedure, and correct site prior to state of procedure.    Patient was placed with HOB 30 degrees. Patient placement site was prepped with chlorhexidine solution, then draped using maximum sterile barrier protection. Using the Bard Site Rite 8, the catheter was placed using the Modified Seldinger Technique. Strict adherence to outline aseptic technique including handwashing, glove and gown, utilizing mask and cap, plus draping the patient with a sterile drape was observed. Upon completion of line placement, the insertion site was covered with a sterile occlusive CHG dressing. Pt tolerated procedure well.     All materials used for catheter insertion, including the intact guide wires, were accounted for at the end of the procedure.  Number of attempts: 1  Complications/Comments: None    Emergency Placement: No  Site: New  Anatomical Site of insertion: Left Brachial vein  Catheter size/length: 4F, 20cm  US guided Bard single lumen power midline placed

## 2023-09-27 NOTE — PROGRESS NOTE ADULT - ASSESSMENT
72 F w/h/o uncontrolled T2DM (A1C 9.4%) on basal/bolus insulin PTA. Unknown DM complications. Also COPD, secondary adrenal Insufficiency on chronic steroids, colorectal cancer s/p resection (colostomy bag), Chronic A fib on Eliquis, and tracheomalacia and multiple intubations, type A aortic dissection s/p aortic dissection repair on 9/07/22, sepsis. Pt well known to this provider from prior admissions. Pt recently admitted with SOB and found to have anemia and severe aortic stenosis>requiring valve in valve TAVR 9/7/23 discharged 9/10/23. Back with increasing productive cough, dyspnea and also found to have Herpes zoster completed antiviral tx. On Methylprednisolone 40mg mg po.  Endocrine consult for management of hyperglycemia. Tolerating POs with BG levels variable between hypo and hyperglycemia for the last 2 days. Pt now with positive fever/leukocytosis> tested positive for COVID today. Will continue to adjust insulin doses to BG goal 100 to 180s.

## 2023-09-27 NOTE — PROGRESS NOTE ADULT - ASSESSMENT
72 year old on chronic steroids for for COPD, and adrenal insufficiency asthma, TIA, bronchiectasias, TIA, recurrent VZV. Recently had a TAVR and was readmitted for sob and copd   Being treated by pulmonary and yesterday note  to have a rash on her right upper leg  Classic VZV rash localized.  no signs of dissemination    pt was treated with a short course of invanz for esbl proteus  in sputum and possible exacerbation of COPD  Last several days with fever and elevated wbc.  coughing but not alot worse than usual  no focal complains like dysuria, diarrhea  or abd pain           suggest  covid test  chest ct scan  bc urine culture  start Invanz again pending work up

## 2023-09-28 LAB
ALBUMIN SERPL ELPH-MCNC: 3.1 G/DL — LOW (ref 3.3–5)
ALP SERPL-CCNC: 65 U/L — SIGNIFICANT CHANGE UP (ref 40–120)
ALT FLD-CCNC: 24 U/L — SIGNIFICANT CHANGE UP (ref 10–45)
ANION GAP SERPL CALC-SCNC: 14 MMOL/L — SIGNIFICANT CHANGE UP (ref 5–17)
APTT BLD: 36.1 SEC — HIGH (ref 24.5–35.6)
AST SERPL-CCNC: 16 U/L — SIGNIFICANT CHANGE UP (ref 10–40)
BILIRUB DIRECT SERPL-MCNC: <0.1 MG/DL — SIGNIFICANT CHANGE UP (ref 0–0.3)
BILIRUB INDIRECT FLD-MCNC: >0.2 MG/DL — SIGNIFICANT CHANGE UP (ref 0.2–1)
BILIRUB SERPL-MCNC: 0.3 MG/DL — SIGNIFICANT CHANGE UP (ref 0.2–1.2)
BUN SERPL-MCNC: 16 MG/DL — SIGNIFICANT CHANGE UP (ref 7–23)
CALCIUM SERPL-MCNC: 8 MG/DL — LOW (ref 8.4–10.5)
CHLORIDE SERPL-SCNC: 104 MMOL/L — SIGNIFICANT CHANGE UP (ref 96–108)
CHOLEST SERPL-MCNC: 134 MG/DL — SIGNIFICANT CHANGE UP
CO2 SERPL-SCNC: 20 MMOL/L — LOW (ref 22–31)
CREAT SERPL-MCNC: 0.61 MG/DL — SIGNIFICANT CHANGE UP (ref 0.5–1.3)
CREAT SERPL-MCNC: 0.61 MG/DL — SIGNIFICANT CHANGE UP (ref 0.5–1.3)
CULTURE RESULTS: SIGNIFICANT CHANGE UP
EGFR: 93 ML/MIN/1.73M2 — SIGNIFICANT CHANGE UP
EGFR: 93 ML/MIN/1.73M2 — SIGNIFICANT CHANGE UP
GLUCOSE BLDC GLUCOMTR-MCNC: 172 MG/DL — HIGH (ref 70–99)
GLUCOSE BLDC GLUCOMTR-MCNC: 206 MG/DL — HIGH (ref 70–99)
GLUCOSE BLDC GLUCOMTR-MCNC: 329 MG/DL — HIGH (ref 70–99)
GLUCOSE BLDC GLUCOMTR-MCNC: 330 MG/DL — HIGH (ref 70–99)
GLUCOSE BLDC GLUCOMTR-MCNC: 336 MG/DL — HIGH (ref 70–99)
GLUCOSE BLDC GLUCOMTR-MCNC: 69 MG/DL — LOW (ref 70–99)
GLUCOSE BLDC GLUCOMTR-MCNC: 88 MG/DL — SIGNIFICANT CHANGE UP (ref 70–99)
GLUCOSE BLDC GLUCOMTR-MCNC: 98 MG/DL — SIGNIFICANT CHANGE UP (ref 70–99)
GLUCOSE SERPL-MCNC: 145 MG/DL — HIGH (ref 70–99)
HCT VFR BLD CALC: 33.3 % — LOW (ref 34.5–45)
HDLC SERPL-MCNC: 88 MG/DL — SIGNIFICANT CHANGE UP
HGB BLD-MCNC: 9.7 G/DL — LOW (ref 11.5–15.5)
INR BLD: 2.59 RATIO — HIGH (ref 0.85–1.18)
LIPID PNL WITH DIRECT LDL SERPL: 34 MG/DL — SIGNIFICANT CHANGE UP
MAGNESIUM SERPL-MCNC: 2.1 MG/DL — SIGNIFICANT CHANGE UP (ref 1.6–2.6)
MCHC RBC-ENTMCNC: 22.5 PG — LOW (ref 27–34)
MCHC RBC-ENTMCNC: 29.1 GM/DL — LOW (ref 32–36)
MCV RBC AUTO: 77.1 FL — LOW (ref 80–100)
NON HDL CHOLESTEROL: 46 MG/DL — SIGNIFICANT CHANGE UP
NRBC # BLD: 0 /100 WBCS — SIGNIFICANT CHANGE UP (ref 0–0)
PHOSPHATE SERPL-MCNC: 2.4 MG/DL — LOW (ref 2.5–4.5)
PLATELET # BLD AUTO: 307 K/UL — SIGNIFICANT CHANGE UP (ref 150–400)
POTASSIUM SERPL-MCNC: 5 MMOL/L — SIGNIFICANT CHANGE UP (ref 3.5–5.3)
POTASSIUM SERPL-SCNC: 5 MMOL/L — SIGNIFICANT CHANGE UP (ref 3.5–5.3)
PROT SERPL-MCNC: 5.4 G/DL — LOW (ref 6–8.3)
PROTHROM AB SERPL-ACNC: 26.5 SEC — HIGH (ref 9.5–13)
RBC # BLD: 4.32 M/UL — SIGNIFICANT CHANGE UP (ref 3.8–5.2)
RBC # FLD: 24.5 % — HIGH (ref 10.3–14.5)
SODIUM SERPL-SCNC: 138 MMOL/L — SIGNIFICANT CHANGE UP (ref 135–145)
SPECIMEN SOURCE: SIGNIFICANT CHANGE UP
TRIGL SERPL-MCNC: 50 MG/DL — SIGNIFICANT CHANGE UP
WBC # BLD: 18.29 K/UL — HIGH (ref 3.8–10.5)
WBC # FLD AUTO: 18.29 K/UL — HIGH (ref 3.8–10.5)

## 2023-09-28 PROCEDURE — 71250 CT THORAX DX C-: CPT | Mod: 26

## 2023-09-28 PROCEDURE — 99232 SBSQ HOSP IP/OBS MODERATE 35: CPT

## 2023-09-28 RX ORDER — INSULIN LISPRO 100/ML
12 VIAL (ML) SUBCUTANEOUS
Refills: 0 | Status: DISCONTINUED | OUTPATIENT
Start: 2023-09-28 | End: 2023-09-29

## 2023-09-28 RX ORDER — WARFARIN SODIUM 2.5 MG/1
5 TABLET ORAL ONCE
Refills: 0 | Status: COMPLETED | OUTPATIENT
Start: 2023-09-28 | End: 2023-09-28

## 2023-09-28 RX ORDER — ONDANSETRON 8 MG/1
4 TABLET, FILM COATED ORAL ONCE
Refills: 0 | Status: COMPLETED | OUTPATIENT
Start: 2023-09-28 | End: 2023-09-28

## 2023-09-28 RX ORDER — DIPHENHYDRAMINE HCL 50 MG
50 CAPSULE ORAL ONCE
Refills: 0 | Status: COMPLETED | OUTPATIENT
Start: 2023-09-28 | End: 2023-09-28

## 2023-09-28 RX ORDER — POLYMYXIN B SULF/TRIMETHOPRIM 10000-1/ML
1 DROPS OPHTHALMIC (EYE)
Refills: 0 | Status: DISCONTINUED | OUTPATIENT
Start: 2023-09-28 | End: 2023-09-28

## 2023-09-28 RX ORDER — ASCORBIC ACID 60 MG
1 TABLET,CHEWABLE ORAL
Qty: 0 | Refills: 0 | DISCHARGE
Start: 2023-09-28

## 2023-09-28 RX ORDER — INSULIN GLARGINE 100 [IU]/ML
18 INJECTION, SOLUTION SUBCUTANEOUS
Qty: 0 | Refills: 0 | DISCHARGE
Start: 2023-09-28

## 2023-09-28 RX ORDER — ERTAPENEM SODIUM 1 G/1
1000 INJECTION, POWDER, LYOPHILIZED, FOR SOLUTION INTRAMUSCULAR; INTRAVENOUS EVERY 24 HOURS
Refills: 0 | Status: DISCONTINUED | OUTPATIENT
Start: 2023-09-28 | End: 2023-09-29

## 2023-09-28 RX ORDER — PANTOPRAZOLE SODIUM 20 MG/1
1 TABLET, DELAYED RELEASE ORAL
Qty: 0 | Refills: 0 | DISCHARGE
Start: 2023-09-28

## 2023-09-28 RX ORDER — INSULIN GLARGINE 100 [IU]/ML
18 INJECTION, SOLUTION SUBCUTANEOUS AT BEDTIME
Refills: 0 | Status: DISCONTINUED | OUTPATIENT
Start: 2023-09-28 | End: 2023-10-01

## 2023-09-28 RX ADMIN — BUDESONIDE AND FORMOTEROL FUMARATE DIHYDRATE 2 PUFF(S): 160; 4.5 AEROSOL RESPIRATORY (INHALATION) at 06:15

## 2023-09-28 RX ADMIN — POLYETHYLENE GLYCOL 3350 17 GRAM(S): 17 POWDER, FOR SOLUTION ORAL at 06:16

## 2023-09-28 RX ADMIN — Medication 50 MILLIGRAM(S): at 13:35

## 2023-09-28 RX ADMIN — Medication 200 MILLIGRAM(S): at 11:53

## 2023-09-28 RX ADMIN — Medication 1 APPLICATION(S): at 06:15

## 2023-09-28 RX ADMIN — BUDESONIDE AND FORMOTEROL FUMARATE DIHYDRATE 2 PUFF(S): 160; 4.5 AEROSOL RESPIRATORY (INHALATION) at 17:38

## 2023-09-28 RX ADMIN — Medication 1 TABLET(S): at 11:56

## 2023-09-28 RX ADMIN — GABAPENTIN 100 MILLIGRAM(S): 400 CAPSULE ORAL at 13:35

## 2023-09-28 RX ADMIN — MEXILETINE HYDROCHLORIDE 200 MILLIGRAM(S): 150 CAPSULE ORAL at 06:15

## 2023-09-28 RX ADMIN — Medication 12 UNIT(S): at 17:37

## 2023-09-28 RX ADMIN — Medication 3 MILLILITER(S): at 11:56

## 2023-09-28 RX ADMIN — Medication 2 MILLILITER(S): at 14:16

## 2023-09-28 RX ADMIN — Medication 600 MILLIGRAM(S): at 06:13

## 2023-09-28 RX ADMIN — Medication 600 MILLIGRAM(S): at 17:37

## 2023-09-28 RX ADMIN — Medication 2 MILLILITER(S): at 22:26

## 2023-09-28 RX ADMIN — Medication 200 MILLIGRAM(S): at 06:20

## 2023-09-28 RX ADMIN — ERTAPENEM SODIUM 120 MILLIGRAM(S): 1 INJECTION, POWDER, LYOPHILIZED, FOR SOLUTION INTRAMUSCULAR; INTRAVENOUS at 14:15

## 2023-09-28 RX ADMIN — GABAPENTIN 100 MILLIGRAM(S): 400 CAPSULE ORAL at 06:14

## 2023-09-28 RX ADMIN — INSULIN GLARGINE 18 UNIT(S): 100 INJECTION, SOLUTION SUBCUTANEOUS at 22:22

## 2023-09-28 RX ADMIN — Medication 2 MILLILITER(S): at 06:16

## 2023-09-28 RX ADMIN — Medication 4: at 11:55

## 2023-09-28 RX ADMIN — Medication 100 MILLIGRAM(S): at 13:35

## 2023-09-28 RX ADMIN — GABAPENTIN 100 MILLIGRAM(S): 400 CAPSULE ORAL at 22:25

## 2023-09-28 RX ADMIN — Medication 8: at 17:37

## 2023-09-28 RX ADMIN — CLOPIDOGREL BISULFATE 75 MILLIGRAM(S): 75 TABLET, FILM COATED ORAL at 11:52

## 2023-09-28 RX ADMIN — REMDESIVIR 200 MILLIGRAM(S): 5 INJECTION INTRAVENOUS at 17:38

## 2023-09-28 RX ADMIN — Medication 4: at 22:23

## 2023-09-28 RX ADMIN — Medication 500 MILLIGRAM(S): at 11:52

## 2023-09-28 RX ADMIN — Medication 3 MILLILITER(S): at 23:55

## 2023-09-28 RX ADMIN — Medication 10 UNIT(S): at 11:55

## 2023-09-28 RX ADMIN — Medication 200 MILLIGRAM(S): at 17:37

## 2023-09-28 RX ADMIN — Medication 3 MILLILITER(S): at 06:12

## 2023-09-28 RX ADMIN — Medication 1 APPLICATION(S): at 17:37

## 2023-09-28 RX ADMIN — MEXILETINE HYDROCHLORIDE 200 MILLIGRAM(S): 150 CAPSULE ORAL at 22:25

## 2023-09-28 RX ADMIN — ONDANSETRON 4 MILLIGRAM(S): 8 TABLET, FILM COATED ORAL at 03:26

## 2023-09-28 RX ADMIN — Medication 200 MILLIGRAM(S): at 23:56

## 2023-09-28 RX ADMIN — Medication 40 MILLIGRAM(S): at 06:17

## 2023-09-28 RX ADMIN — Medication 3 MILLILITER(S): at 17:38

## 2023-09-28 RX ADMIN — PANTOPRAZOLE SODIUM 40 MILLIGRAM(S): 20 TABLET, DELAYED RELEASE ORAL at 11:56

## 2023-09-28 RX ADMIN — SENNA PLUS 2 TABLET(S): 8.6 TABLET ORAL at 22:24

## 2023-09-28 RX ADMIN — Medication 100 MILLIGRAM(S): at 22:25

## 2023-09-28 RX ADMIN — WARFARIN SODIUM 5 MILLIGRAM(S): 2.5 TABLET ORAL at 22:25

## 2023-09-28 RX ADMIN — SIMETHICONE 80 MILLIGRAM(S): 80 TABLET, CHEWABLE ORAL at 03:26

## 2023-09-28 RX ADMIN — POLYETHYLENE GLYCOL 3350 17 GRAM(S): 17 POWDER, FOR SOLUTION ORAL at 17:37

## 2023-09-28 RX ADMIN — CHLORHEXIDINE GLUCONATE 1 APPLICATION(S): 213 SOLUTION TOPICAL at 06:16

## 2023-09-28 RX ADMIN — ATORVASTATIN CALCIUM 20 MILLIGRAM(S): 80 TABLET, FILM COATED ORAL at 22:24

## 2023-09-28 RX ADMIN — MEXILETINE HYDROCHLORIDE 200 MILLIGRAM(S): 150 CAPSULE ORAL at 13:35

## 2023-09-28 RX ADMIN — TIOTROPIUM BROMIDE 2 PUFF(S): 18 CAPSULE ORAL; RESPIRATORY (INHALATION) at 11:56

## 2023-09-28 RX ADMIN — Medication 100 MILLIGRAM(S): at 06:14

## 2023-09-28 NOTE — PROGRESS NOTE ADULT - SUBJECTIVE AND OBJECTIVE BOX
DATE OF SERVICE: 09-28-23 @ 11:37    Patient is a 73y old  Female who presents with a chief complaint of sob (28 Sep 2023 08:57)      SUBJECTIVE / OVERNIGHT EVENTS:  No chest pain. No shortness of breath. No complaints. No events overnight.     MEDICATIONS  (STANDING):  acetylcysteine 10%  Inhalation 2 milliLiter(s) Inhalation every 8 hours  albuterol/ipratropium for Nebulization 3 milliLiter(s) Nebulizer every 6 hours  ascorbic acid 500 milliGRAM(s) Oral daily  atorvastatin 20 milliGRAM(s) Oral at bedtime  benzonatate 100 milliGRAM(s) Oral three times a day  budesonide  80 MICROgram(s)/formoterol 4.5 MICROgram(s) Inhaler 2 Puff(s) Inhalation two times a day  chlorhexidine 2% Cloths 1 Application(s) Topical <User Schedule>  clopidogrel Tablet 75 milliGRAM(s) Oral daily  dextrose 5%. 1000 milliLiter(s) (50 mL/Hr) IV Continuous <Continuous>  dextrose 5%. 1000 milliLiter(s) (100 mL/Hr) IV Continuous <Continuous>  dextrose 50% Injectable 25 Gram(s) IV Push once  dextrose 50% Injectable 25 Gram(s) IV Push once  dextrose 50% Injectable 12.5 Gram(s) IV Push once  ertapenem  IVPB 1000 milliGRAM(s) IV Intermittent every 24 hours  gabapentin 100 milliGRAM(s) Oral every 8 hours  glucagon  Injectable 1 milliGRAM(s) IntraMuscular once  guaiFENesin  milliGRAM(s) Oral every 12 hours  guaiFENesin Oral Liquid (Sugar-Free) 200 milliGRAM(s) Oral every 6 hours  hydrocortisone 1% Cream 1 Application(s) Topical two times a day  insulin glargine Injectable (LANTUS) 22 Unit(s) SubCutaneous at bedtime  insulin lispro (ADMELOG) corrective regimen sliding scale   SubCutaneous <User Schedule>  insulin lispro (ADMELOG) corrective regimen sliding scale   SubCutaneous three times a day before meals  insulin lispro Injectable (ADMELOG) 12 Unit(s) SubCutaneous before breakfast  insulin lispro Injectable (ADMELOG) 10 Unit(s) SubCutaneous before lunch  insulin lispro Injectable (ADMELOG) 4 Unit(s) SubCutaneous at bedtime  insulin lispro Injectable (ADMELOG) 12 Unit(s) SubCutaneous before dinner  lidocaine   4% Patch 2 Patch Transdermal daily  methylPREDNISolone 40 milliGRAM(s) Oral daily  mexiletine 200 milliGRAM(s) Oral every 8 hours  multivitamin 1 Tablet(s) Oral daily  pantoprazole    Tablet 40 milliGRAM(s) Oral daily  polyethylene glycol 3350 17 Gram(s) Oral two times a day  remdesivir  IVPB 100 milliGRAM(s) IV Intermittent every 24 hours  remdesivir  IVPB   IV Intermittent   senna 2 Tablet(s) Oral at bedtime  tiotropium 2.5 MICROgram(s) Inhaler 2 Puff(s) Inhalation daily    MEDICATIONS  (PRN):  acetaminophen     Tablet .. 650 milliGRAM(s) Oral every 6 hours PRN Temp greater or equal to 38C (100.4F), Mild Pain (1 - 3)  dextrose Oral Gel 15 Gram(s) Oral once PRN Blood Glucose LESS THAN 70 milliGRAM(s)/deciliter  diphenhydrAMINE 25 milliGRAM(s) Oral every 4 hours PRN Rash and/or Itching  simethicone 80 milliGRAM(s) Chew every 12 hours PRN Gas  traMADol 50 milliGRAM(s) Oral every 8 hours PRN Moderate Pain (4 - 6)      Vital Signs Last 24 Hrs  T(C): 37 (28 Sep 2023 04:44), Max: 37 (28 Sep 2023 04:44)  T(F): 98.6 (28 Sep 2023 04:44), Max: 98.6 (28 Sep 2023 04:44)  HR: 92 (28 Sep 2023 04:44) (79 - 92)  BP: 132/62 (28 Sep 2023 04:44) (104/64 - 159/67)  BP(mean): --  RR: 18 (28 Sep 2023 04:44) (18 - 18)  SpO2: 95% (28 Sep 2023 04:44) (95% - 98%)    Parameters below as of 28 Sep 2023 04:44  Patient On (Oxygen Delivery Method): room air      CAPILLARY BLOOD GLUCOSE      POCT Blood Glucose.: 88 mg/dL (28 Sep 2023 07:50)  POCT Blood Glucose.: 172 mg/dL (28 Sep 2023 02:56)  POCT Blood Glucose.: 98 mg/dL (28 Sep 2023 02:33)  POCT Blood Glucose.: 69 mg/dL (28 Sep 2023 01:55)  POCT Blood Glucose.: 333 mg/dL (27 Sep 2023 21:17)  POCT Blood Glucose.: 450 mg/dL (27 Sep 2023 17:12)  POCT Blood Glucose.: 72 mg/dL (27 Sep 2023 11:38)    I&O's Summary    27 Sep 2023 07:01  -  28 Sep 2023 07:00  --------------------------------------------------------  IN: 480 mL / OUT: 0 mL / NET: 480 mL    28 Sep 2023 07:01  -  28 Sep 2023 11:37  --------------------------------------------------------  IN: 120 mL / OUT: 0 mL / NET: 120 mL        PHYSICAL EXAM:  GENERAL: NAD, well-developed  HEAD:  Atraumatic, Normocephalic  EYES: EOMI, PERRLA, conjunctiva and sclera clear  NECK: Supple, No JVD  CHEST/LUNG: Clear to auscultation bilaterally; No wheeze  HEART: Regular rate and rhythm; No murmurs, rubs, or gallops  ABDOMEN: Soft, Nontender, Nondistended; Bowel sounds present  EXTREMITIES:  2+ Peripheral Pulses, No clubbing, cyanosis, or edema  PSYCH: AAOx3  NEUROLOGY: non-focal  SKIN: No rashes or lesions    LABS:                        9.7    18.29 )-----------( 307      ( 28 Sep 2023 11:17 )             33.3           PT/INR - ( 28 Sep 2023 11:17 )   PT: 26.5 sec;   INR: 2.59 ratio         PTT - ( 28 Sep 2023 11:17 )  PTT:36.1 sec          RADIOLOGY & ADDITIONAL TESTS:    Imaging Personally Reviewed:    Consultant(s) Notes Reviewed:      Care Discussed with Consultants/Other Providers:

## 2023-09-28 NOTE — PROGRESS NOTE ADULT - ASSESSMENT
72 F w/h/o uncontrolled T2DM (A1C 9.4%) on basal/bolus insulin PTA. Unknown DM complications. Also COPD, secondary adrenal Insufficiency on chronic steroids, colorectal cancer s/p resection (colostomy bag), Chronic A fib on Eliquis, and tracheomalacia and multiple intubations, type A aortic dissection s/p aortic dissection repair on 9/07/22, sepsis. Pt well known to this provider from prior admissions. Pt recently admitted with SOB and found to have anemia and severe aortic stenosis>requiring valve in valve TAVR 9/7/23 discharged 9/10/23. Back with increasing productive cough, dyspnea and also found to have Herpes zoster completed antiviral tx. On Methylprednisolone 40mg mg po.  Endocrine consult for management of hyperglycemia. Tolerating POs with BG levels variable between hypo and hyperglycemia for the last 3 days. Pt now with positive fever/leukocytosis> tested positive for COVID  . Will continue to adjust insulin doses to BG goal 100 to 180s.

## 2023-09-28 NOTE — PROGRESS NOTE ADULT - ASSESSMENT
72 y.o. F with PMH of asthma on chronic steroids, bronchiectasis, allergic rhinitis,  recurrent PNA,  tracheomalacia s/p tracheoplasty, ESBL proteus infections (sputum),  diabetes, paroxysmal A-fib on coumadin, colorectal cancer many years ago status post colostomy, recent hospitalization at an outside hospital  for acute respiratory failure with hypoxia secondary to asthma/COPD exacerbation and bronchopneumonia 6/5/2023 - 6/16/2023), and AVR 2022 with Dr. Cabrera.  Pt admits to chronic SOB and cough for years and is up to date on vaccines. Dr. Allison follows as an outpatient for her COPD. s/p 9/6 TAVR- [facility] with Dr. Gonsalves and Dr. Leahy. discharged home 9/10. Pt presents to office today for f/u visit  w/ c/o of worsening SOB. Pt to be admitted for further workup and eval. Pt stable at this time. VSS; O2 sats 97% RA.  ,H/o  ,bronchiectasis, tracheomalacia s/p tracheoplasty who presents after a recent admission for bronchiectasis exacerbation (6/2023, treated with Ertapenem for ESBL proteus), and again in 9/2023 for an acute flare whose hospital course was complicated by identification of severe AS requiring valve in valve TAVR 9/7/23    fever and elevated WBC count  - seen by ID  - covid test positive  - chest ct scan  - bc urine culture  - remdesivir x 3 DAYS  - d/c invanz if CT chest is neg for pna    bronchiectasis  9/13 Pulm eval /rec Routine sputum culture, urine strep/legionella, RVP, COVID  Considering culture history would start ertapenem and vancomycin for MRSA   Bronchodilator: Standing duonebs q 4 hours, nebulized pulmicort  - vest therapy  - mucous for culture and sensitivity done  - nebulized mucomyst  - Ensure she is getting twice per day airway clearance: Albuterol nebulizer followed by saline nebulizer followed by cough assist device and encouragement to cough and clear  - steroid taper to oral  as per pulm  - pt refusing prednisone as she has been on methylprednisolone since childhood  - ertapenam per ID - completed 5 days    cough  - Robitussin  - tessalon    S/P TAVR (transcatheter aortic valve replacement).   ·  Plan: Structural Team Following   Continue with Plavix 75 mg PO daily   Continue with Mexiletine 200 mg every 8 hours   Continue with Atorvastatin 20 mg PO HS    Daily EKG   Increase activity as tolerated.   Encourage Chest PT / Pulmonary toileting and Incentive spirometry every 1 hour x 10 while awake.   Continue with PUD and DVT prophylaxis.   Daily Shower     shingles  - valcyte for 7 days      Uncontrolled type 2 diabetes mellitus with hyperglycemia.   ·  Plan: Test BG ac and hs and 2am if eating. Q6H while NPO  -restart Lantus 32 units sq hs   - restart  Admelog 15-16-18 units qac   Hold if NPO or eating less than 50% of meals.  -  moderate correction scale ac meals and moderate correction hs and 2am.      Plan to d/c on basal bolus.   Outpt. endo follow-up. Dr Saavedra  Outpt. optho, podiatry, micro/cr  Consider Freestepan Luis E 3  upon discharge.    Essential hypertension.   ·  Plan: Goal BP <130/80   - cont current meds     Hyperlipidemia.   ·  Plan: LDL goal <70 due to DM  Pt LDL N/A  Order fasting lipid if not recently done  Statin as noted above     F/u levels as out pt.    H/O adrenal insufficiency.   ·  Plan: On chronic steroids at home      Atrial fibrillation.   ·  Plan: Continue with Mexiletine 200 mg every 8 hours   Daily PT/ INR and dose coumadin    dispo  - d/c planning home with home PT when stable  - follow up with pulm Dr Allison

## 2023-09-28 NOTE — PROGRESS NOTE ADULT - PROBLEM SELECTOR PLAN 4
Started on slow steroid taper to goal dose of Methylprednisolone 16mg /day.   Please inform team if pt having surgery or becomes critically ill because she would needs stress steroid doses.  F/u as out pt.  Discussed case with Dr Chavez about acute illness> per MD, pt already getting stress dose steroids so no need to adjust doses at this time. methylpred 40 mg daily until 09/30 is equivalent to Hydrocortisone 200 mg daily and that is good enough for stress dose.    discussed with patient and  RN   Contact via [employer] Teams during business hours  To reach covering provider access AMION via sun[employer] tools  For Urgent matters/after-hours/weekends/holidays please page endocrine fellow on call   For nonurgent matters please email [email or web address]    Please note that this patient may be followed by different provider tomorrow.  Notify endocrine 24 hours prior to discharge for final recommendations Started on slow steroid taper to goal dose of Methylprednisolone 16mg /day.   Please inform team if pt having surgery or becomes critically ill because she would needs stress steroid doses.  F/u as out pt.  Discussed case with Dr Chavez about acute illness> per MD, pt already getting stress dose steroids so no need to adjust doses at this time. methylpred 40 mg daily until 09/30 is equivalent to Hydrocortisone 200 mg daily and that is good enough for stress dose.    discussed with patient and  RN   Contact via Microsoft Teams during business hours  To reach covering provider access AMION via [place]  For Urgent matters/after-hours/weekends/holidays please page endocrine fellow on call   For nonurgent matters please email [email or web address]    Please note that this patient may be followed by different provider tomorrow.  Notify endocrine 24 hours prior to discharge for final recommendations    addendum- noted dinner BG remains above goal- increase lunch admelog to 12 units AC Lunch starting tomorrow

## 2023-09-28 NOTE — PROGRESS NOTE ADULT - SUBJECTIVE AND OBJECTIVE BOX
infectious diseases progress note:    Patient is a 73y old  Female who presents with a chief complaint of sob (27 Sep 2023 16:25)        Heart failure             Allergies    tetanus toxoid (Short breath)  Dilaudid (Short breath)  codeine (Short breath)  iodine (Short breath; Swelling)  Avelox (Short breath; Pruritus)  shellfish (Anaphylaxis)  cefepime (Anaphylaxis)  aspirin (Short breath)  Valium (Short breath)  penicillin (Anaphylaxis)    Intolerances        ANTIBIOTICS/RELEVANT:  antimicrobials  ertapenem  IVPB 1000 milliGRAM(s) IV Intermittent every 24 hours  remdesivir  IVPB   IV Intermittent   remdesivir  IVPB 100 milliGRAM(s) IV Intermittent every 24 hours    immunologic:    OTHER:  acetaminophen     Tablet .. 650 milliGRAM(s) Oral every 6 hours PRN  acetylcysteine 10%  Inhalation 2 milliLiter(s) Inhalation every 8 hours  albuterol/ipratropium for Nebulization 3 milliLiter(s) Nebulizer every 6 hours  ascorbic acid 500 milliGRAM(s) Oral daily  atorvastatin 20 milliGRAM(s) Oral at bedtime  benzonatate 100 milliGRAM(s) Oral three times a day  budesonide  80 MICROgram(s)/formoterol 4.5 MICROgram(s) Inhaler 2 Puff(s) Inhalation two times a day  chlorhexidine 2% Cloths 1 Application(s) Topical <User Schedule>  clopidogrel Tablet 75 milliGRAM(s) Oral daily  dextrose 5%. 1000 milliLiter(s) IV Continuous <Continuous>  dextrose 5%. 1000 milliLiter(s) IV Continuous <Continuous>  dextrose 50% Injectable 25 Gram(s) IV Push once  dextrose 50% Injectable 25 Gram(s) IV Push once  dextrose 50% Injectable 12.5 Gram(s) IV Push once  dextrose Oral Gel 15 Gram(s) Oral once PRN  diphenhydrAMINE 25 milliGRAM(s) Oral every 4 hours PRN  gabapentin 100 milliGRAM(s) Oral every 8 hours  glucagon  Injectable 1 milliGRAM(s) IntraMuscular once  guaiFENesin  milliGRAM(s) Oral every 12 hours  guaiFENesin Oral Liquid (Sugar-Free) 200 milliGRAM(s) Oral every 6 hours  hydrocortisone 1% Cream 1 Application(s) Topical two times a day  insulin glargine Injectable (LANTUS) 22 Unit(s) SubCutaneous at bedtime  insulin lispro (ADMELOG) corrective regimen sliding scale   SubCutaneous three times a day before meals  insulin lispro (ADMELOG) corrective regimen sliding scale   SubCutaneous <User Schedule>  insulin lispro Injectable (ADMELOG) 4 Unit(s) SubCutaneous at bedtime  insulin lispro Injectable (ADMELOG) 12 Unit(s) SubCutaneous before breakfast  insulin lispro Injectable (ADMELOG) 10 Unit(s) SubCutaneous before lunch  insulin lispro Injectable (ADMELOG) 12 Unit(s) SubCutaneous before dinner  lidocaine   4% Patch 2 Patch Transdermal daily  methylPREDNISolone 40 milliGRAM(s) Oral daily  mexiletine 200 milliGRAM(s) Oral every 8 hours  multivitamin 1 Tablet(s) Oral daily  pantoprazole    Tablet 40 milliGRAM(s) Oral daily  polyethylene glycol 3350 17 Gram(s) Oral two times a day  senna 2 Tablet(s) Oral at bedtime  simethicone 80 milliGRAM(s) Chew every 12 hours PRN  tiotropium 2.5 MICROgram(s) Inhaler 2 Puff(s) Inhalation daily  traMADol 50 milliGRAM(s) Oral every 8 hours PRN      Objective:  Vital Signs Last 24 Hrs  T(C): 37 (28 Sep 2023 04:44), Max: 37 (28 Sep 2023 04:44)  T(F): 98.6 (28 Sep 2023 04:44), Max: 98.6 (28 Sep 2023 04:44)  HR: 92 (28 Sep 2023 04:44) (79 - 92)  BP: 132/62 (28 Sep 2023 04:44) (104/64 - 159/67)  BP(mean): --  RR: 18 (28 Sep 2023 04:44) (18 - 18)  SpO2: 95% (28 Sep 2023 04:44) (95% - 98%)    Parameters below as of 28 Sep 2023 04:44  Patient On (Oxygen Delivery Method): room air           Eyes:EBER, EOMI  Ear/Nose/Throat: no oral lesion, no sinus tenderness on percussion	  Neck:no JVD, no lymphadenopathy, supple  Respiratory: CTA barry  Cardiovascular: S1S2 RRR, no murmurs  Gastrointestinal:soft, (+) BS, no HSM  Extremities:no e/e/c        LABS:                        9.7    25.12 )-----------( 336      ( 27 Sep 2023 11:07 )             33.1           PT/INR - ( 27 Sep 2023 11:07 )   PT: 26.9 sec;   INR: 2.51 ratio                 MICROBIOLOGY:    RECENT CULTURES:        RESPIRATORY CULTURES:              RADIOLOGY & ADDITIONAL STUDIES:        Pager [phone]  After 5 pm/weekends or if no response :[phone]

## 2023-09-28 NOTE — PROGRESS NOTE ADULT - ASSESSMENT
72 year old on chronic steroids for for COPD, and adrenal insufficiency asthma, TIA, bronchiectasias, TIA, recurrent VZV. Recently had a TAVR and was readmitted for sob and copd   Being treated by pulmonary and yesterday note  to have a rash on her right upper leg  Classic VZV rash localized.  no signs of dissemination    pt was treated with a short course of invanz for esbl proteus  in sputum and possible exacerbation of COPD  Last several days with fever and elevated wbc.  coughing but not alot worse than usual  no focal complains like dysuria, diarrhea  or abd pain             covid test positive but not sob or hypoxic at this point   chest ct scan or chest xray   bc urine culture pending      remdesivir day 2  add decadron if  hypoxic   await chest imaging   I suspect we will be able to dc invanz

## 2023-09-28 NOTE — PROGRESS NOTE ADULT - SUBJECTIVE AND OBJECTIVE BOX
seen earlier today     Chief Complaint: Diabetes Mellitus follow up    INTERVAL HX: hypoglycemic overnight/ this am, patient reports not drinking glucerna last night noted snack insulin held ; BG variable past 24 hours ; breakfast admelog held due to not eating breakfast       Review of Systems:  General: As above  GI: No nausea, vomiting  Endocrine: no  S&Sx of hypoglycemia    Allergies    tetanus toxoid (Short breath)  Dilaudid (Short breath)  codeine (Short breath)  iodine (Short breath; Swelling)  Avelox (Short breath; Pruritus)  shellfish (Anaphylaxis)  cefepime (Anaphylaxis)  aspirin (Short breath)  Valium (Short breath)  penicillin (Anaphylaxis)    Intolerances      MEDICATIONS  (STANDING):  acetylcysteine 10%  Inhalation 2 milliLiter(s) Inhalation every 8 hours  albuterol/ipratropium for Nebulization 3 milliLiter(s) Nebulizer every 6 hours  ascorbic acid 500 milliGRAM(s) Oral daily  atorvastatin 20 milliGRAM(s) Oral at bedtime  benzonatate 100 milliGRAM(s) Oral three times a day  budesonide  80 MICROgram(s)/formoterol 4.5 MICROgram(s) Inhaler 2 Puff(s) Inhalation two times a day  chlorhexidine 2% Cloths 1 Application(s) Topical <User Schedule>  clopidogrel Tablet 75 milliGRAM(s) Oral daily  dextrose 5%. 1000 milliLiter(s) (50 mL/Hr) IV Continuous <Continuous>  dextrose 5%. 1000 milliLiter(s) (100 mL/Hr) IV Continuous <Continuous>  dextrose 50% Injectable 12.5 Gram(s) IV Push once  dextrose 50% Injectable 25 Gram(s) IV Push once  dextrose 50% Injectable 25 Gram(s) IV Push once  ertapenem  IVPB 1000 milliGRAM(s) IV Intermittent every 24 hours  gabapentin 100 milliGRAM(s) Oral every 8 hours  glucagon  Injectable 1 milliGRAM(s) IntraMuscular once  guaiFENesin  milliGRAM(s) Oral every 12 hours  guaiFENesin Oral Liquid (Sugar-Free) 200 milliGRAM(s) Oral every 6 hours  hydrocortisone 1% Cream 1 Application(s) Topical two times a day  insulin glargine Injectable (LANTUS) 22 Unit(s) SubCutaneous at bedtime  insulin lispro (ADMELOG) corrective regimen sliding scale   SubCutaneous three times a day before meals  insulin lispro (ADMELOG) corrective regimen sliding scale   SubCutaneous <User Schedule>  insulin lispro Injectable (ADMELOG) 12 Unit(s) SubCutaneous before dinner  insulin lispro Injectable (ADMELOG) 4 Unit(s) SubCutaneous at bedtime  insulin lispro Injectable (ADMELOG) 12 Unit(s) SubCutaneous before breakfast  insulin lispro Injectable (ADMELOG) 10 Unit(s) SubCutaneous before lunch  lidocaine   4% Patch 2 Patch Transdermal daily  methylPREDNISolone 40 milliGRAM(s) Oral daily  mexiletine 200 milliGRAM(s) Oral every 8 hours  multivitamin 1 Tablet(s) Oral daily  pantoprazole    Tablet 40 milliGRAM(s) Oral daily  polyethylene glycol 3350 17 Gram(s) Oral two times a day  remdesivir  IVPB   IV Intermittent   remdesivir  IVPB 100 milliGRAM(s) IV Intermittent every 24 hours  senna 2 Tablet(s) Oral at bedtime  tiotropium 2.5 MICROgram(s) Inhaler 2 Puff(s) Inhalation daily      atorvastatin   20 milliGRAM(s) Oral (09-27-23 @ 22:17)    insulin glargine Injectable (LANTUS)   22 Unit(s) SubCutaneous (09-27-23 @ 22:13)    insulin lispro (ADMELOG) corrective regimen sliding scale   4  SubCutaneous (09-28-23 @ 11:55)   12 Unit(s) SubCutaneous (09-27-23 @ 17:14)    insulin lispro (ADMELOG) corrective regimen sliding scale   4 Unit(s) SubCutaneous (09-27-23 @ 22:13)    insulin lispro Injectable (ADMELOG)   10 Unit(s) SubCutaneous (09-28-23 @ 11:55)    insulin lispro Injectable (ADMELOG)   12 Unit(s) SubCutaneous (09-27-23 @ 17:14)    methylPREDNISolone   40 milliGRAM(s) Oral (09-28-23 @ 06:17)        PHYSICAL EXAM:  VITALS: T(C): 37 (09-28-23 @ 04:44)  T(F): 98.6 (09-28-23 @ 04:44), Max: 98.6 (09-28-23 @ 04:44)  HR: 92 (09-28-23 @ 04:44) (79 - 92)  BP: 132/62 (09-28-23 @ 04:44) (104/64 - 159/67)  RR:  (18 - 18)  SpO2:  (95% - 98%)  Wt(kg): --  GENERAL: NAD  Respiratory: Respirations unlabored   Extremities: Warm, no edema  NEURO: Alert , appropriate     LABS:  POCT Blood Glucose.: 206 mg/dL (09-28-23 @ 11:45)  POCT Blood Glucose.: 88 mg/dL (09-28-23 @ 07:50)  POCT Blood Glucose.: 172 mg/dL (09-28-23 @ 02:56)  POCT Blood Glucose.: 98 mg/dL (09-28-23 @ 02:33)  POCT Blood Glucose.: 69 mg/dL (09-28-23 @ 01:55)  POCT Blood Glucose.: 333 mg/dL (09-27-23 @ 21:17)  POCT Blood Glucose.: 450 mg/dL (09-27-23 @ 17:12)  POCT Blood Glucose.: 72 mg/dL (09-27-23 @ 11:38)  POCT Blood Glucose.: 66 mg/dL (09-27-23 @ 11:36)  POCT Blood Glucose.: 128 mg/dL (09-27-23 @ 07:50)  POCT Blood Glucose.: 177 mg/dL (09-27-23 @ 01:59)  POCT Blood Glucose.: 205 mg/dL (09-26-23 @ 21:10)  POCT Blood Glucose.: 90 mg/dL (09-26-23 @ 17:27)  POCT Blood Glucose.: 77 mg/dL (09-26-23 @ 11:36)  POCT Blood Glucose.: 157 mg/dL (09-26-23 @ 07:44)  POCT Blood Glucose.: 104 mg/dL (09-26-23 @ 03:14)  POCT Blood Glucose.: 98 mg/dL (09-26-23 @ 02:57)  POCT Blood Glucose.: 82 mg/dL (09-26-23 @ 02:39)  POCT Blood Glucose.: 56 mg/dL (09-26-23 @ 02:15)  POCT Blood Glucose.: 204 mg/dL (09-25-23 @ 21:22)  POCT Blood Glucose.: 206 mg/dL (09-25-23 @ 17:02)                          9.7    18.29 )-----------( 307      ( 28 Sep 2023 11:17 )             33.3     09-28    138  |  104  |  16  ----------------------------<  145<H>  5.0   |  20<L>  |  0.61    Ca    8.0<L>      28 Sep 2023 11:17  Phos  2.4     09-28  Mg     2.1     09-28    TPro  5.4<L>  /  Alb  3.1<L>  /  TBili  0.3  /  DBili  <0.1  /  AST  16  /  ALT  24  /  AlkPhos  65  09-28    eGFR: 93 mL/min/1.73m2 (28 Sep 2023 11:17)  eGFR: 93 mL/min/1.73m2 (28 Sep 2023 11:17)      Thyroid Function Tests:  09-06 @ 09:32 TSH 0.49 FreeT4 1.2 T3 82 Anti TPO -- Anti Thyroglobulin Ab -- TSI --      A1C with Estimated Average Glucose Result: 5.7 % (09-08-23 @ 06:39)    Estimated Average Glucose: 117 mg/dL (09-08-23 @ 06:39)        Diet, Regular:   Consistent Carbohydrate Evening Snack (CSTCHOSN)  Isidro(7 Gm Arginine/7 Gm Glut/1.2 Gm HMB     Qty per Day:  2  Supplement Feeding Modality:  Oral  Glucerna Shake Cans or Servings Per Day:  3       Frequency:  Daily (09-15-23 @ 19:02) [Active]

## 2023-09-28 NOTE — CHART NOTE - NSCHARTNOTEFT_GEN_A_CORE
Patient chest CT results reviewed with Dr. Nelson ID - Status post valve in valve TAVR. Increased multifocal distal airway   impaction with associated clusters of nodules and bandlike atelectasis. No changes as per ID.

## 2023-09-29 ENCOUNTER — TRANSCRIPTION ENCOUNTER (OUTPATIENT)
Age: 73
End: 2023-09-29

## 2023-09-29 LAB
ALBUMIN SERPL ELPH-MCNC: 3 G/DL — LOW (ref 3.3–5)
ALP SERPL-CCNC: 64 U/L — SIGNIFICANT CHANGE UP (ref 40–120)
ALT FLD-CCNC: 22 U/L — SIGNIFICANT CHANGE UP (ref 10–45)
ANION GAP SERPL CALC-SCNC: 10 MMOL/L — SIGNIFICANT CHANGE UP (ref 5–17)
AST SERPL-CCNC: 16 U/L — SIGNIFICANT CHANGE UP (ref 10–40)
BILIRUB DIRECT SERPL-MCNC: <0.1 MG/DL — SIGNIFICANT CHANGE UP (ref 0–0.3)
BILIRUB INDIRECT FLD-MCNC: >0.1 MG/DL — LOW (ref 0.2–1)
BILIRUB SERPL-MCNC: 0.2 MG/DL — SIGNIFICANT CHANGE UP (ref 0.2–1.2)
BUN SERPL-MCNC: 24 MG/DL — HIGH (ref 7–23)
CALCIUM SERPL-MCNC: 8.5 MG/DL — SIGNIFICANT CHANGE UP (ref 8.4–10.5)
CHLORIDE SERPL-SCNC: 105 MMOL/L — SIGNIFICANT CHANGE UP (ref 96–108)
CO2 SERPL-SCNC: 25 MMOL/L — SIGNIFICANT CHANGE UP (ref 22–31)
CREAT SERPL-MCNC: 0.55 MG/DL — SIGNIFICANT CHANGE UP (ref 0.5–1.3)
CREAT SERPL-MCNC: 0.57 MG/DL — SIGNIFICANT CHANGE UP (ref 0.5–1.3)
EGFR: 95 ML/MIN/1.73M2 — SIGNIFICANT CHANGE UP
EGFR: 96 ML/MIN/1.73M2 — SIGNIFICANT CHANGE UP
GLUCOSE BLDC GLUCOMTR-MCNC: 115 MG/DL — HIGH (ref 70–99)
GLUCOSE BLDC GLUCOMTR-MCNC: 176 MG/DL — HIGH (ref 70–99)
GLUCOSE BLDC GLUCOMTR-MCNC: 257 MG/DL — HIGH (ref 70–99)
GLUCOSE BLDC GLUCOMTR-MCNC: 311 MG/DL — HIGH (ref 70–99)
GLUCOSE BLDC GLUCOMTR-MCNC: 316 MG/DL — HIGH (ref 70–99)
GLUCOSE SERPL-MCNC: 124 MG/DL — HIGH (ref 70–99)
HCT VFR BLD CALC: 31.3 % — LOW (ref 34.5–45)
HGB BLD-MCNC: 9.4 G/DL — LOW (ref 11.5–15.5)
INR BLD: 2.85 RATIO — HIGH (ref 0.85–1.18)
MCHC RBC-ENTMCNC: 23.2 PG — LOW (ref 27–34)
MCHC RBC-ENTMCNC: 30 GM/DL — LOW (ref 32–36)
MCV RBC AUTO: 77.1 FL — LOW (ref 80–100)
NRBC # BLD: 0 /100 WBCS — SIGNIFICANT CHANGE UP (ref 0–0)
PLATELET # BLD AUTO: 294 K/UL — SIGNIFICANT CHANGE UP (ref 150–400)
POTASSIUM SERPL-MCNC: 4.6 MMOL/L — SIGNIFICANT CHANGE UP (ref 3.5–5.3)
POTASSIUM SERPL-SCNC: 4.6 MMOL/L — SIGNIFICANT CHANGE UP (ref 3.5–5.3)
PROT SERPL-MCNC: 5.4 G/DL — LOW (ref 6–8.3)
PROTHROM AB SERPL-ACNC: 30.4 SEC — HIGH (ref 9.5–13)
RBC # BLD: 4.06 M/UL — SIGNIFICANT CHANGE UP (ref 3.8–5.2)
RBC # FLD: 23.6 % — HIGH (ref 10.3–14.5)
SODIUM SERPL-SCNC: 140 MMOL/L — SIGNIFICANT CHANGE UP (ref 135–145)
WBC # BLD: 21.2 K/UL — HIGH (ref 3.8–10.5)
WBC # FLD AUTO: 21.2 K/UL — HIGH (ref 3.8–10.5)

## 2023-09-29 PROCEDURE — 99232 SBSQ HOSP IP/OBS MODERATE 35: CPT

## 2023-09-29 RX ORDER — INSULIN LISPRO 100/ML
0 VIAL (ML) SUBCUTANEOUS
Qty: 1 | Refills: 0 | DISCHARGE
Start: 2023-09-29 | End: 2023-10-28

## 2023-09-29 RX ORDER — ISOPROPYL ALCOHOL, BENZOCAINE .7; .06 ML/ML; ML/ML
0 SWAB TOPICAL
Qty: 100 | Refills: 1
Start: 2023-09-29

## 2023-09-29 RX ORDER — INSULIN LISPRO 100/ML
14 VIAL (ML) SUBCUTANEOUS
Refills: 0 | Status: DISCONTINUED | OUTPATIENT
Start: 2023-09-30 | End: 2023-10-01

## 2023-09-29 RX ORDER — PANTOPRAZOLE SODIUM 20 MG/1
1 TABLET, DELAYED RELEASE ORAL
Qty: 30 | Refills: 0
Start: 2023-09-29 | End: 2023-10-28

## 2023-09-29 RX ORDER — INSULIN GLARGINE 100 [IU]/ML
18 INJECTION, SOLUTION SUBCUTANEOUS
Qty: 1 | Refills: 0
Start: 2023-09-29 | End: 2023-10-28

## 2023-09-29 RX ORDER — INSULIN LISPRO 100/ML
14 VIAL (ML) SUBCUTANEOUS
Refills: 0 | Status: DISCONTINUED | OUTPATIENT
Start: 2023-09-29 | End: 2023-10-01

## 2023-09-29 RX ORDER — INSULIN GLARGINE 100 [IU]/ML
16 INJECTION, SOLUTION SUBCUTANEOUS
Qty: 1 | Refills: 0 | DISCHARGE
Start: 2023-09-29 | End: 2023-10-28

## 2023-09-29 RX ORDER — WARFARIN SODIUM 2.5 MG/1
5 TABLET ORAL ONCE
Refills: 0 | Status: COMPLETED | OUTPATIENT
Start: 2023-09-29 | End: 2023-09-29

## 2023-09-29 RX ORDER — ASCORBIC ACID 60 MG
1 TABLET,CHEWABLE ORAL
Qty: 30 | Refills: 0
Start: 2023-09-29 | End: 2023-10-28

## 2023-09-29 RX ORDER — INSULIN LISPRO 100/ML
15 VIAL (ML) SUBCUTANEOUS
Qty: 1 | Refills: 0
Start: 2023-09-29 | End: 2023-10-28

## 2023-09-29 RX ORDER — GABAPENTIN 400 MG/1
1 CAPSULE ORAL
Qty: 90 | Refills: 0
Start: 2023-09-29 | End: 2023-10-28

## 2023-09-29 RX ADMIN — Medication 100 MILLIGRAM(S): at 05:57

## 2023-09-29 RX ADMIN — INSULIN GLARGINE 18 UNIT(S): 100 INJECTION, SOLUTION SUBCUTANEOUS at 21:55

## 2023-09-29 RX ADMIN — Medication 14 UNIT(S): at 17:31

## 2023-09-29 RX ADMIN — Medication 40 MILLIGRAM(S): at 05:57

## 2023-09-29 RX ADMIN — BUDESONIDE AND FORMOTEROL FUMARATE DIHYDRATE 2 PUFF(S): 160; 4.5 AEROSOL RESPIRATORY (INHALATION) at 17:03

## 2023-09-29 RX ADMIN — Medication 2 MILLILITER(S): at 13:15

## 2023-09-29 RX ADMIN — Medication 1 APPLICATION(S): at 17:03

## 2023-09-29 RX ADMIN — Medication 3 MILLILITER(S): at 10:58

## 2023-09-29 RX ADMIN — Medication 2: at 08:41

## 2023-09-29 RX ADMIN — MEXILETINE HYDROCHLORIDE 200 MILLIGRAM(S): 150 CAPSULE ORAL at 21:56

## 2023-09-29 RX ADMIN — Medication 12 UNIT(S): at 12:06

## 2023-09-29 RX ADMIN — Medication 200 MILLIGRAM(S): at 10:59

## 2023-09-29 RX ADMIN — MEXILETINE HYDROCHLORIDE 200 MILLIGRAM(S): 150 CAPSULE ORAL at 13:15

## 2023-09-29 RX ADMIN — GABAPENTIN 100 MILLIGRAM(S): 400 CAPSULE ORAL at 21:59

## 2023-09-29 RX ADMIN — Medication 2: at 02:08

## 2023-09-29 RX ADMIN — Medication 2 MILLILITER(S): at 05:56

## 2023-09-29 RX ADMIN — Medication 600 MILLIGRAM(S): at 05:54

## 2023-09-29 RX ADMIN — MEXILETINE HYDROCHLORIDE 200 MILLIGRAM(S): 150 CAPSULE ORAL at 05:55

## 2023-09-29 RX ADMIN — BUDESONIDE AND FORMOTEROL FUMARATE DIHYDRATE 2 PUFF(S): 160; 4.5 AEROSOL RESPIRATORY (INHALATION) at 05:55

## 2023-09-29 RX ADMIN — Medication 2 MILLILITER(S): at 21:59

## 2023-09-29 RX ADMIN — TIOTROPIUM BROMIDE 2 PUFF(S): 18 CAPSULE ORAL; RESPIRATORY (INHALATION) at 11:02

## 2023-09-29 RX ADMIN — WARFARIN SODIUM 5 MILLIGRAM(S): 2.5 TABLET ORAL at 21:56

## 2023-09-29 RX ADMIN — Medication 600 MILLIGRAM(S): at 17:03

## 2023-09-29 RX ADMIN — Medication 12 UNIT(S): at 08:41

## 2023-09-29 RX ADMIN — GABAPENTIN 100 MILLIGRAM(S): 400 CAPSULE ORAL at 05:56

## 2023-09-29 RX ADMIN — POLYETHYLENE GLYCOL 3350 17 GRAM(S): 17 POWDER, FOR SOLUTION ORAL at 05:56

## 2023-09-29 RX ADMIN — Medication 200 MILLIGRAM(S): at 05:53

## 2023-09-29 RX ADMIN — GABAPENTIN 100 MILLIGRAM(S): 400 CAPSULE ORAL at 13:13

## 2023-09-29 RX ADMIN — REMDESIVIR 200 MILLIGRAM(S): 5 INJECTION INTRAVENOUS at 10:58

## 2023-09-29 RX ADMIN — ATORVASTATIN CALCIUM 20 MILLIGRAM(S): 80 TABLET, FILM COATED ORAL at 21:56

## 2023-09-29 RX ADMIN — Medication 3 MILLILITER(S): at 05:53

## 2023-09-29 RX ADMIN — Medication 100 MILLIGRAM(S): at 13:13

## 2023-09-29 RX ADMIN — Medication 8: at 17:30

## 2023-09-29 RX ADMIN — Medication 100 MILLIGRAM(S): at 21:56

## 2023-09-29 RX ADMIN — CLOPIDOGREL BISULFATE 75 MILLIGRAM(S): 75 TABLET, FILM COATED ORAL at 10:59

## 2023-09-29 RX ADMIN — Medication 1 APPLICATION(S): at 05:55

## 2023-09-29 RX ADMIN — Medication 500 MILLIGRAM(S): at 10:59

## 2023-09-29 RX ADMIN — Medication 4: at 21:55

## 2023-09-29 RX ADMIN — PANTOPRAZOLE SODIUM 40 MILLIGRAM(S): 20 TABLET, DELAYED RELEASE ORAL at 11:01

## 2023-09-29 RX ADMIN — Medication 3 MILLILITER(S): at 17:03

## 2023-09-29 RX ADMIN — Medication 1 TABLET(S): at 11:00

## 2023-09-29 RX ADMIN — CHLORHEXIDINE GLUCONATE 1 APPLICATION(S): 213 SOLUTION TOPICAL at 05:58

## 2023-09-29 RX ADMIN — Medication 3 MILLILITER(S): at 23:52

## 2023-09-29 NOTE — PROGRESS NOTE ADULT - SUBJECTIVE AND OBJECTIVE BOX
DIABETES FOLLOW UP NOTE: Saw pt earlier today    Chief Complaint: Endocrine consult requested for management of T2DM    INTERVAL HX: Pt stable, reports tolerating POs and eating well. Pt states she still with productive cough and SOB. Still with leukocytosis >improving. BG erratic  between 100s to 300s in the last 24 hours. Noted BG tedn to be lower fasting and ac lunch and go up for dinner and bedtime. On Methylprednisolone PO 40mg daily x 5 days (day 4) coming down to 32mg the day after tomorrow x 5 days with goal 16mg/day. No hypoglycemia in the  last 24 hours.   Also on remdesevir for covid. Per primary team, pt is cleared for discharge.            Review of Systems:  General: As above  Cardiovascular: No chest pain, palpitations  Respiratory: No SOB, no cough  GI: No nausea, vomiting, abdominal pain  Endocrine: No polyuria, polydipsia or S&Sx of hypoglycemia    Allergies    tetanus toxoid (Short breath)  Dilaudid (Short breath)  codeine (Short breath)  iodine (Short breath; Swelling)  Avelox (Short breath; Pruritus)  shellfish (Anaphylaxis)  cefepime (Anaphylaxis)  aspirin (Short breath)  Valium (Short breath)  penicillin (Anaphylaxis)    Intolerances      MEDICATIONS:  atorvastatin 20 milliGRAM(s) Oral at bedtime  hydrocortisone 1% Cream 1 Application(s) Topical two times a day  insulin glargine Injectable (LANTUS) 18 Unit(s) SubCutaneous at bedtime  insulin lispro (ADMELOG) corrective regimen sliding scale   SubCutaneous three times a day before meals  insulin lispro (ADMELOG) corrective regimen sliding scale   SubCutaneous <User Schedule>  insulin lispro Injectable (ADMELOG) 12 Unit(s) SubCutaneous before breakfast  insulin lispro Injectable (ADMELOG) 12 Unit(s) SubCutaneous before dinner  insulin lispro Injectable (ADMELOG) 12 Unit(s) SubCutaneous before lunch  insulin lispro Injectable (ADMELOG) 4 Unit(s) SubCutaneous at bedtime  methylPREDNISolone 40 milliGRAM(s) Oral daily  remdesivir  IVPB 100 milliGRAM(s) IV Intermittent every 24 hours    PHYSICAL EXAM:  VITALS: T(C): 36.5 (09-29-23 @ 11:55)  T(F): 97.7 (09-29-23 @ 11:55), Max: 99.4 (09-29-23 @ 05:21)  HR: 80 (09-29-23 @ 11:55) (68 - 84)  BP: 100/60 (09-29-23 @ 11:55) (100/60 - 137/65)  RR:  (18 - 18)  SpO2:  (96% - 98%)  Wt(kg): --  GENERAL: Female sitting at edge of bed with some SOB> Pt was coming out of bathroom at time of visit   Abdomen: Soft, nontender, non distended, central adiposity.   Extremities: Warm, no edema in all 4 exts  NEURO: A&O X3      LABS:  POCT Blood Glucose.: 115 mg/dL (09-29-23 @ 11:44)  POCT Blood Glucose.: 176 mg/dL (09-29-23 @ 08:01)  POCT Blood Glucose.: 257 mg/dL (09-29-23 @ 01:58)  POCT Blood Glucose.: 329 mg/dL (09-28-23 @ 22:21)  POCT Blood Glucose.: 336 mg/dL (09-28-23 @ 21:15)  POCT Blood Glucose.: 330 mg/dL (09-28-23 @ 17:29)  POCT Blood Glucose.: 206 mg/dL (09-28-23 @ 11:45)  POCT Blood Glucose.: 88 mg/dL (09-28-23 @ 07:50)  POCT Blood Glucose.: 172 mg/dL (09-28-23 @ 02:56)  POCT Blood Glucose.: 98 mg/dL (09-28-23 @ 02:33)  POCT Blood Glucose.: 69 mg/dL (09-28-23 @ 01:55)  POCT Blood Glucose.: 333 mg/dL (09-27-23 @ 21:17)  POCT Blood Glucose.: 450 mg/dL (09-27-23 @ 17:12)  POCT Blood Glucose.: 72 mg/dL (09-27-23 @ 11:38)  POCT Blood Glucose.: 66 mg/dL (09-27-23 @ 11:36)  POCT Blood Glucose.: 128 mg/dL (09-27-23 @ 07:50)  POCT Blood Glucose.: 177 mg/dL (09-27-23 @ 01:59)  POCT Blood Glucose.: 205 mg/dL (09-26-23 @ 21:10)  POCT Blood Glucose.: 90 mg/dL (09-26-23 @ 17:27)                            9.4    21.20 )-----------( 294      ( 29 Sep 2023 11:38 )             31.3       09-29    140  |  105  |  24<H>  ----------------------------<  124<H>  4.6   |  25  |  0.57    eGFR: 95    Ca    8.5      09-29  Mg     2.1     09-28  Phos  2.4     09-28    TPro  5.4<L>  /  Alb  3.0<L>  /  TBili  0.2  /  DBili  <0.1  /  AST  16  /  ALT  22  /  AlkPhos  64  09-29    Thyroid Function Tests:  09-06 @ 09:32 TSH 0.49 FreeT4 1.2 T3 82 Anti TPO -- Anti Thyroglobulin Ab -- TSI --      A1C with Estimated Average Glucose Result: 5.7 % (09-08-23 @ 06:39)      Estimated Average Glucose: 117 mg/dL (09-08-23 @ 06:39)        09-28 Chol 134 Direct LDL -- LDL calculated 34 HDL 88 Trig 50

## 2023-09-29 NOTE — PROGRESS NOTE ADULT - SUBJECTIVE AND OBJECTIVE BOX
infectious diseases progress note:    Patient is a 73y old  Female who presents with a chief complaint of sob (28 Sep 2023 12:59)        Heart failure          ROS:       Allergies    tetanus toxoid (Short breath)  Dilaudid (Short breath)  codeine (Short breath)  iodine (Short breath; Swelling)  Avelox (Short breath; Pruritus)  shellfish (Anaphylaxis)  cefepime (Anaphylaxis)  aspirin (Short breath)  Valium (Short breath)  penicillin (Anaphylaxis)    Intolerances        ANTIBIOTICS/RELEVANT:  antimicrobials  ertapenem  IVPB 1000 milliGRAM(s) IV Intermittent every 24 hours  remdesivir  IVPB   IV Intermittent   remdesivir  IVPB 100 milliGRAM(s) IV Intermittent every 24 hours    immunologic:    OTHER:  acetaminophen     Tablet .. 650 milliGRAM(s) Oral every 6 hours PRN  acetylcysteine 10%  Inhalation 2 milliLiter(s) Inhalation every 8 hours  albuterol/ipratropium for Nebulization 3 milliLiter(s) Nebulizer every 6 hours  ascorbic acid 500 milliGRAM(s) Oral daily  atorvastatin 20 milliGRAM(s) Oral at bedtime  benzonatate 100 milliGRAM(s) Oral three times a day  budesonide  80 MICROgram(s)/formoterol 4.5 MICROgram(s) Inhaler 2 Puff(s) Inhalation two times a day  chlorhexidine 2% Cloths 1 Application(s) Topical <User Schedule>  clopidogrel Tablet 75 milliGRAM(s) Oral daily  dextrose 5%. 1000 milliLiter(s) IV Continuous <Continuous>  dextrose 5%. 1000 milliLiter(s) IV Continuous <Continuous>  dextrose 50% Injectable 25 Gram(s) IV Push once  dextrose 50% Injectable 12.5 Gram(s) IV Push once  dextrose 50% Injectable 25 Gram(s) IV Push once  dextrose Oral Gel 15 Gram(s) Oral once PRN  diphenhydrAMINE 25 milliGRAM(s) Oral every 4 hours PRN  gabapentin 100 milliGRAM(s) Oral every 8 hours  glucagon  Injectable 1 milliGRAM(s) IntraMuscular once  guaiFENesin  milliGRAM(s) Oral every 12 hours  guaiFENesin Oral Liquid (Sugar-Free) 200 milliGRAM(s) Oral every 6 hours  hydrocortisone 1% Cream 1 Application(s) Topical two times a day  insulin glargine Injectable (LANTUS) 18 Unit(s) SubCutaneous at bedtime  insulin lispro (ADMELOG) corrective regimen sliding scale   SubCutaneous <User Schedule>  insulin lispro (ADMELOG) corrective regimen sliding scale   SubCutaneous three times a day before meals  insulin lispro Injectable (ADMELOG) 12 Unit(s) SubCutaneous before dinner  insulin lispro Injectable (ADMELOG) 12 Unit(s) SubCutaneous before lunch  insulin lispro Injectable (ADMELOG) 4 Unit(s) SubCutaneous at bedtime  insulin lispro Injectable (ADMELOG) 12 Unit(s) SubCutaneous before breakfast  lidocaine   4% Patch 2 Patch Transdermal daily  methylPREDNISolone 40 milliGRAM(s) Oral daily  mexiletine 200 milliGRAM(s) Oral every 8 hours  multivitamin 1 Tablet(s) Oral daily  pantoprazole    Tablet 40 milliGRAM(s) Oral daily  polyethylene glycol 3350 17 Gram(s) Oral two times a day  senna 2 Tablet(s) Oral at bedtime  simethicone 80 milliGRAM(s) Chew every 12 hours PRN  tiotropium 2.5 MICROgram(s) Inhaler 2 Puff(s) Inhalation daily  traMADol 50 milliGRAM(s) Oral every 8 hours PRN      Objective:  Vital Signs Last 24 Hrs  T(C): 37.4 (29 Sep 2023 05:21), Max: 37.4 (29 Sep 2023 05:21)  T(F): 99.4 (29 Sep 2023 05:21), Max: 99.4 (29 Sep 2023 05:21)  HR: 84 (29 Sep 2023 05:21) (68 - 84)  BP: 122/71 (29 Sep 2023 05:21) (112/62 - 137/65)  BP(mean): --  RR: 18 (29 Sep 2023 05:21) (18 - 18)  SpO2: 98% (29 Sep 2023 05:21) (96% - 98%)    Parameters below as of 29 Sep 2023 05:21  Patient On (Oxygen Delivery Method): room air        PHYSICAL EXAM:   Ear/Nose/Throat: no oral lesion, no sinus tenderness on percussion	  Neck:no JVD, no lymphadenopathy, supple  Respiratory: CTA barry  Cardiovascular: S1S2 RRR, no murmurs  Gastrointestinal:soft, (+) BS, no HSM  Extremities:no e/e/c        LABS:                        9.7    18.29 )-----------( 307      ( 28 Sep 2023 11:17 )             33.3     09-28    138  |  104  |  16  ----------------------------<  145<H>  5.0   |  20<L>  |  0.61    Ca    8.0<L>      28 Sep 2023 11:17  Phos  2.4     09-28  Mg     2.1     09-28    TPro  5.4<L>  /  Alb  3.1<L>  /  TBili  0.3  /  DBili  <0.1  /  AST  16  /  ALT  24  /  AlkPhos  65  09-28    PT/INR - ( 28 Sep 2023 11:17 )   PT: 26.5 sec;   INR: 2.59 ratio         PTT - ( 28 Sep 2023 11:17 )  PTT:36.1 sec  Urinalysis Basic - ( 28 Sep 2023 11:17 )    Color: x / Appearance: x / SG: x / pH: x  Gluc: 145 mg/dL / Ketone: x  / Bili: x / Urobili: x   Blood: x / Protein: x / Nitrite: x   Leuk Esterase: x / RBC: x / WBC x   Sq Epi: x / Non Sq Epi: x / Bacteria: x          MICROBIOLOGY:    RECENT CULTURES:  09-27 @ 17:58 Clean Catch Clean Catch (Midstream)                <10,000 CFU/mL Normal Urogenital Jessica          RESPIRATORY CULTURES:              RADIOLOGY & ADDITIONAL STUDIES:        Pager [phone]  After 5 pm/weekends or if no response :[phone]

## 2023-09-29 NOTE — PROGRESS NOTE ADULT - NUTRITIONAL ASSESSMENT
Diet, Regular:   Consistent Carbohydrate {Evening Snack} (CSTCHOSN)  Isidro(7 Gm Arginine/7 Gm Glut/1.2 Gm HMB     Qty per Day:  2  Supplement Feeding Modality:  Oral  Glucerna Shake Cans or Servings Per Day:  3       Frequency:  Daily (09-15-23 @ 19:02) [Active]    Please see RD assessment and/or follow up.  Managed by primary team as well
Diet, Regular:   Consistent Carbohydrate {No Snacks} (CSTCHO)  Supplement Feeding Modality:  Oral  Glucerna Shake Cans or Servings Per Day:  3       Frequency:  Daily (09-12-23 @ 12:02) [Active]
Diet, Regular:   Consistent Carbohydrate {Evening Snack} (CSTCHOSN)  Isidro(7 Gm Arginine/7 Gm Glut/1.2 Gm HMB     Qty per Day:  2  Supplement Feeding Modality:  Oral  Glucerna Shake Cans or Servings Per Day:  3       Frequency:  Daily (09-15-23 @ 19:02) [Active]    Please see RD assessment and/or follow up.  Managed by primary team as well
Diet, Regular:   Consistent Carbohydrate {No Snacks} (CSTCHO)  Supplement Feeding Modality:  Oral  Glucerna Shake Cans or Servings Per Day:  3       Frequency:  Daily (09-12-23 @ 12:02) [Active]      Please see RD assessment and/or follow up.  Managed by primary team as well
Diet, Regular:   Consistent Carbohydrate {Evening Snack} (CSTCHOSN)  Isidro(7 Gm Arginine/7 Gm Glut/1.2 Gm HMB     Qty per Day:  2  Supplement Feeding Modality:  Oral  Glucerna Shake Cans or Servings Per Day:  3       Frequency:  Daily (09-15-23 @ 19:02) [Active]    Please see RD assessment and/or follow up.  Managed by primary team as well
Diet, Regular:   Consistent Carbohydrate {Evening Snack} (CSTCHOSN)  Isidro(7 Gm Arginine/7 Gm Glut/1.2 Gm HMB     Qty per Day:  2  Supplement Feeding Modality:  Oral  Glucerna Shake Cans or Servings Per Day:  3       Frequency:  Daily (09-15-23 @ 19:02) [Active]  Please see RD assessment and/or follow up.  Managed by primary team as well
Diet, Regular:   Consistent Carbohydrate {Evening Snack} (CSTCHOSN)  Isidro(7 Gm Arginine/7 Gm Glut/1.2 Gm HMB     Qty per Day:  2  Supplement Feeding Modality:  Oral  Glucerna Shake Cans or Servings Per Day:  3       Frequency:  Daily (09-15-23 @ 19:02) [Active]
Diet, Regular:   Consistent Carbohydrate {Evening Snack} (CSTCHOSN)  Isidro(7 Gm Arginine/7 Gm Glut/1.2 Gm HMB     Qty per Day:  2  Supplement Feeding Modality:  Oral  Glucerna Shake Cans or Servings Per Day:  3       Frequency:  Daily (09-15-23 @ 19:02) [Active]
Diet, Regular:   Consistent Carbohydrate {Evening Snack} (CSTCHOSN)  Isidro(7 Gm Arginine/7 Gm Glut/1.2 Gm HMB     Qty per Day:  2  Supplement Feeding Modality:  Oral  Glucerna Shake Cans or Servings Per Day:  3       Frequency:  Daily (09-15-23 @ 19:02) [Active]  Please see RD assessment and/or follow up.  Managed by primary team as well
This patient has been assessed with a concern for Malnutrition and has been determined to have a diagnosis/diagnoses of Moderate protein-calorie malnutrition.    This patient is being managed with:   Diet Consistent Carbohydrate w/Evening Snack-  Entered: Sep  8 2023  5:00PM

## 2023-09-29 NOTE — PROGRESS NOTE BEHAVIORAL HEALTH - NSBHCHARTREVIEWVS_PSY_A_CORE FT
Vital Signs Last 24 Hrs  T(C): 37.1 (24 May 2018 04:43), Max: 37.3 (23 May 2018 20:20)  T(F): 98.8 (24 May 2018 04:43), Max: 99.2 (23 May 2018 20:20)  HR: 76 (24 May 2018 04:43) (76 - 100)  BP: 149/70 (24 May 2018 04:43) (149/70 - 172/78)  BP(mean): --  RR: 18 (24 May 2018 04:43) (18 - 20)  SpO2: 96% (24 May 2018 04:43) (96% - 98%) Discharged

## 2023-09-29 NOTE — PROGRESS NOTE ADULT - PROBLEM SELECTOR PROBLEM 4
H/O adrenal insufficiency
H/O adrenal insufficiency
Hyperlipidemia
H/O adrenal insufficiency

## 2023-09-29 NOTE — PROGRESS NOTE ADULT - PROBLEM SELECTOR PROBLEM 1
DM2 (diabetes mellitus, type 2)
Uncontrolled type 2 diabetes mellitus with hyperglycemia, with long-term current use of insulin
DM2 (diabetes mellitus, type 2)
Uncontrolled type 2 diabetes mellitus with hyperglycemia, with long-term current use of insulin
S/P TAVR (transcatheter aortic valve replacement)
DM2 (diabetes mellitus, type 2)
Skin normal color for race, warm, dry and intact. No evidence of rash.

## 2023-09-29 NOTE — PROGRESS NOTE ADULT - PROBLEM SELECTOR PROBLEM 3
Hypertension
Hyperlipidemia
Hypertension
Hyperlipidemia
Hyperlipidemia
Hypertension
Hyperlipidemia
Essential hypertension

## 2023-09-29 NOTE — PROGRESS NOTE ADULT - SUBJECTIVE AND OBJECTIVE BOX
DATE OF SERVICE: 09-29-23 @ 11:25    Patient is a 73y old  Female who presents with a chief complaint of sob (29 Sep 2023 08:48)      SUBJECTIVE / OVERNIGHT EVENTS:  No chest pain. No shortness of breath. No complaints. No events overnight.     MEDICATIONS  (STANDING):  acetylcysteine 10%  Inhalation 2 milliLiter(s) Inhalation every 8 hours  albuterol/ipratropium for Nebulization 3 milliLiter(s) Nebulizer every 6 hours  ascorbic acid 500 milliGRAM(s) Oral daily  atorvastatin 20 milliGRAM(s) Oral at bedtime  benzonatate 100 milliGRAM(s) Oral three times a day  budesonide  80 MICROgram(s)/formoterol 4.5 MICROgram(s) Inhaler 2 Puff(s) Inhalation two times a day  chlorhexidine 2% Cloths 1 Application(s) Topical <User Schedule>  clopidogrel Tablet 75 milliGRAM(s) Oral daily  dextrose 5%. 1000 milliLiter(s) (100 mL/Hr) IV Continuous <Continuous>  dextrose 5%. 1000 milliLiter(s) (50 mL/Hr) IV Continuous <Continuous>  dextrose 50% Injectable 25 Gram(s) IV Push once  dextrose 50% Injectable 25 Gram(s) IV Push once  dextrose 50% Injectable 12.5 Gram(s) IV Push once  gabapentin 100 milliGRAM(s) Oral every 8 hours  glucagon  Injectable 1 milliGRAM(s) IntraMuscular once  guaiFENesin  milliGRAM(s) Oral every 12 hours  guaiFENesin Oral Liquid (Sugar-Free) 200 milliGRAM(s) Oral every 6 hours  hydrocortisone 1% Cream 1 Application(s) Topical two times a day  insulin glargine Injectable (LANTUS) 18 Unit(s) SubCutaneous at bedtime  insulin lispro (ADMELOG) corrective regimen sliding scale   SubCutaneous <User Schedule>  insulin lispro (ADMELOG) corrective regimen sliding scale   SubCutaneous three times a day before meals  insulin lispro Injectable (ADMELOG) 12 Unit(s) SubCutaneous before lunch  insulin lispro Injectable (ADMELOG) 12 Unit(s) SubCutaneous before breakfast  insulin lispro Injectable (ADMELOG) 4 Unit(s) SubCutaneous at bedtime  insulin lispro Injectable (ADMELOG) 12 Unit(s) SubCutaneous before dinner  lidocaine   4% Patch 2 Patch Transdermal daily  methylPREDNISolone 40 milliGRAM(s) Oral daily  mexiletine 200 milliGRAM(s) Oral every 8 hours  multivitamin 1 Tablet(s) Oral daily  pantoprazole    Tablet 40 milliGRAM(s) Oral daily  polyethylene glycol 3350 17 Gram(s) Oral two times a day  remdesivir  IVPB 100 milliGRAM(s) IV Intermittent every 24 hours  remdesivir  IVPB   IV Intermittent   senna 2 Tablet(s) Oral at bedtime  tiotropium 2.5 MICROgram(s) Inhaler 2 Puff(s) Inhalation daily    MEDICATIONS  (PRN):  acetaminophen     Tablet .. 650 milliGRAM(s) Oral every 6 hours PRN Temp greater or equal to 38C (100.4F), Mild Pain (1 - 3)  dextrose Oral Gel 15 Gram(s) Oral once PRN Blood Glucose LESS THAN 70 milliGRAM(s)/deciliter  diphenhydrAMINE 25 milliGRAM(s) Oral every 4 hours PRN Rash and/or Itching  simethicone 80 milliGRAM(s) Chew every 12 hours PRN Gas  traMADol 50 milliGRAM(s) Oral every 8 hours PRN Moderate Pain (4 - 6)      Vital Signs Last 24 Hrs  T(C): 37.4 (29 Sep 2023 05:21), Max: 37.4 (29 Sep 2023 05:21)  T(F): 99.4 (29 Sep 2023 05:21), Max: 99.4 (29 Sep 2023 05:21)  HR: 84 (29 Sep 2023 05:21) (68 - 84)  BP: 122/71 (29 Sep 2023 05:21) (112/62 - 137/65)  BP(mean): --  RR: 18 (29 Sep 2023 05:21) (18 - 18)  SpO2: 98% (29 Sep 2023 05:21) (96% - 98%)    Parameters below as of 29 Sep 2023 05:21  Patient On (Oxygen Delivery Method): room air      CAPILLARY BLOOD GLUCOSE      POCT Blood Glucose.: 176 mg/dL (29 Sep 2023 08:01)  POCT Blood Glucose.: 257 mg/dL (29 Sep 2023 01:58)  POCT Blood Glucose.: 329 mg/dL (28 Sep 2023 22:21)  POCT Blood Glucose.: 336 mg/dL (28 Sep 2023 21:15)  POCT Blood Glucose.: 330 mg/dL (28 Sep 2023 17:29)  POCT Blood Glucose.: 206 mg/dL (28 Sep 2023 11:45)    I&O's Summary    28 Sep 2023 07:01  -  29 Sep 2023 07:00  --------------------------------------------------------  IN: 120 mL / OUT: 0 mL / NET: 120 mL    29 Sep 2023 07:01  -  29 Sep 2023 11:25  --------------------------------------------------------  IN: 200 mL / OUT: 0 mL / NET: 200 mL        PHYSICAL EXAM:  GENERAL: NAD, well-developed  HEAD:  Atraumatic, Normocephalic  EYES: EOMI, PERRLA, conjunctiva and sclera clear  NECK: Supple, No JVD  CHEST/LUNG: Clear to auscultation bilaterally; No wheeze  HEART: Regular rate and rhythm; No murmurs, rubs, or gallops  ABDOMEN: Soft, Nontender, Nondistended; Bowel sounds present  EXTREMITIES:  2+ Peripheral Pulses, No clubbing, cyanosis, or edema  PSYCH: AAOx3  NEUROLOGY: non-focal  SKIN: No rashes or lesions    LABS:                        9.7    18.29 )-----------( 307      ( 28 Sep 2023 11:17 )             33.3     09-28    138  |  104  |  16  ----------------------------<  145<H>  5.0   |  20<L>  |  0.61    Ca    8.0<L>      28 Sep 2023 11:17  Phos  2.4     09-28  Mg     2.1     09-28    TPro  5.4<L>  /  Alb  3.1<L>  /  TBili  0.3  /  DBili  <0.1  /  AST  16  /  ALT  24  /  AlkPhos  65  09-28    PT/INR - ( 28 Sep 2023 11:17 )   PT: 26.5 sec;   INR: 2.59 ratio         PTT - ( 28 Sep 2023 11:17 )  PTT:36.1 sec      Urinalysis Basic - ( 28 Sep 2023 11:17 )    Color: x / Appearance: x / SG: x / pH: x  Gluc: 145 mg/dL / Ketone: x  / Bili: x / Urobili: x   Blood: x / Protein: x / Nitrite: x   Leuk Esterase: x / RBC: x / WBC x   Sq Epi: x / Non Sq Epi: x / Bacteria: x    < from: CT Chest No Cont (09.28.23 @ 14:00) >  FINDINGS:    LUNGS, AIRWAYS AND PLEURA: Patent central airways. Overall increased   distal airway impaction with clustered nodules and groundglass opacities   in the lateral right middle lobe, lingula and bilateral lower lobes.   Bandlike areas of atelectasis. Trace pleural effusions, new on the right.   No pneumothorax.    MEDIASTINUM AND MARANDA: No lymphadenopathy.    HEART/VESSELS: Heart size is normal. No pericardial effusion. Status post   ascending aortic and aortic valve replacement with interval   valve-in-valve TAVR. Known residual dissection involving the   thoracoabdominal aorta is better seen on recent contrast-enhanced CT from   9/1/2023.    VISUALIZED UPPER ABDOMEN: Few small stable pancreatic hypoattenuating   cysts. Stable partially imaged complex left renal cyst with mild   peripheral calcifications.    BONES: Degenerative changes. Several densely sclerotic foci throughout   the thoracic vertebral bodies may represent bone islands. Sternotomy   wires are midline and intact.      IMPRESSION:  Status post valve in valve TAVR. Increased multifocal distal airway   impaction with associated clusters of nodules and bandlike atelectasis.      < end of copied text >      RADIOLOGY & ADDITIONAL TESTS:    Imaging Personally Reviewed:    Consultant(s) Notes Reviewed:      Care Discussed with Consultants/Other Providers:

## 2023-09-29 NOTE — PROGRESS NOTE ADULT - ASSESSMENT
72 y.o. F with PMH of asthma on chronic steroids, bronchiectasis, allergic rhinitis,  recurrent PNA,  tracheomalacia s/p tracheoplasty, ESBL proteus infections (sputum),  diabetes, paroxysmal A-fib on coumadin, colorectal cancer many years ago status post colostomy, recent hospitalization at an outside hospital  for acute respiratory failure with hypoxia secondary to asthma/COPD exacerbation and bronchopneumonia 6/5/2023 - 6/16/2023), and AVR 2022 with Dr. Cabrera.  Pt admits to chronic SOB and cough for years and is up to date on vaccines. Dr. Allison follows as an outpatient for her COPD. s/p 9/6 TAVR- [facility] with Dr. Gonsalves and Dr. Leahy. discharged home 9/10. Pt presents to office today for f/u visit  w/ c/o of worsening SOB. Pt to be admitted for further workup and eval. Pt stable at this time. VSS; O2 sats 97% RA.  ,H/o  ,bronchiectasis, tracheomalacia s/p tracheoplasty who presents after a recent admission for bronchiectasis exacerbation (6/2023, treated with Ertapenem for ESBL proteus), and again in 9/2023 for an acute flare whose hospital course was complicated by identification of severe AS requiring valve in valve TAVR 9/7/23    fever and elevated WBC count  - seen by ID  - covid test positive  - chest ct scan  - bc urine culture  - remdesivir x 3 DAYS  - d/c invanz     bronchiectasis  9/13 Pulm eval /rec Routine sputum culture, urine strep/legionella, RVP, COVID  Considering culture history would start ertapenem and vancomycin for MRSA   Bronchodilator: Standing duonebs q 4 hours, nebulized pulmicort  - vest therapy  - mucous for culture and sensitivity done  - nebulized mucomyst  - Ensure she is getting twice per day airway clearance: Albuterol nebulizer followed by saline nebulizer followed by cough assist device and encouragement to cough and clear  - steroid taper to oral  as per pulm  - pt refusing prednisone as she has been on methylprednisolone since childhood  - ertapenam per ID - completed 5 days    cough  - Robitussin  - tessalon    S/P TAVR (transcatheter aortic valve replacement).   ·  Plan: Structural Team Following   Continue with Plavix 75 mg PO daily   Continue with Mexiletine 200 mg every 8 hours   Continue with Atorvastatin 20 mg PO HS    Daily EKG   Increase activity as tolerated.   Encourage Chest PT / Pulmonary toileting and Incentive spirometry every 1 hour x 10 while awake.   Continue with PUD and DVT prophylaxis.   Daily Shower     shingles  - valcyte for 7 days      Uncontrolled type 2 diabetes mellitus with hyperglycemia.   ·  Plan: Test BG ac and hs and 2am if eating. Q6H while NPO  -restart Lantus 32 units sq hs   - restart  Admelog 15-16-18 units qac   Hold if NPO or eating less than 50% of meals.  -  moderate correction scale ac meals and moderate correction hs and 2am.      Plan to d/c on basal bolus.   Outpt. endo follow-up. Dr Saavedra  Outpt. optho, podiatry, micro/cr  Consider Freestepan Luis E 3  upon discharge.    Essential hypertension.   ·  Plan: Goal BP <130/80   - cont current meds     Hyperlipidemia.   ·  Plan: LDL goal <70 due to DM  Pt LDL N/A  Order fasting lipid if not recently done  Statin as noted above     F/u levels as out pt.    H/O adrenal insufficiency.   ·  Plan: On chronic steroids at home      Atrial fibrillation.   ·  Plan: Continue with Mexiletine 200 mg every 8 hours   Daily PT/ INR and dose coumadin    dispo  - d/c planning home with home PT when stable  - follow up with pulm Dr Allison

## 2023-09-29 NOTE — PROGRESS NOTE ADULT - PROBLEM SELECTOR PROBLEM 2
Hyperlipidemia
Hypertension
Hyperlipidemia
Hyperlipidemia
Uncontrolled type 2 diabetes mellitus with hyperglycemia

## 2023-09-29 NOTE — PROGRESS NOTE ADULT - ASSESSMENT
72 F w/h/o uncontrolled T2DM (A1C 9.4%) on basal/bolus insulin PTA. Unknown DM complications. Also COPD, secondary adrenal Insufficiency on chronic steroids, colorectal cancer s/p resection (colostomy bag), Chronic A fib on Eliquis, and tracheomalacia and multiple intubations, type A aortic dissection s/p aortic dissection repair on 9/07/22, sepsis. Pt well known to this provider from prior admissions. Pt recently admitted with SOB and found to have anemia and severe aortic stenosis>requiring valve in valve TAVR 9/7/23 discharged 9/10/23. Back with increasing productive cough, dyspnea and also found to have Herpes zoster completed antiviral tx. On Methylprednisolone 40mg mg po.  Endocrine consult for management of hyperglycemia. Tolerating POs with BG levels variable between 100s to 200s in last 24 hours without further hypoglycemia. Will continue to adjust insulin doses to BG goal 100 to 180s. Pt been treated for COVID and remains with SOB and cough. Per primary team pt is cleared for discharge home over the weekend.

## 2023-09-29 NOTE — PROGRESS NOTE ADULT - PROBLEM SELECTOR PLAN 2
Goal BP <130/80   Manage per primary team.
Test BG ac and hs and 2am if eating. Q6H while NPO  -restart Lantus 32 units sq hs   - restart  Admelog 15-16-18 units qac   Hold if NPO or eating less than 50% of meals.  -restart  moderate correction scale ac meals and moderate correction hs and 2am.      Plan to d/c on basal bolus.   Outpt. endo follow-up. Dr Saavedra  Outpt. optho, podiatry, micro/cr  Consider Tavares Kimbrough 3  upon discharge
Goal BP <130/80   Manage per primary team.
Continue atorvastatin if no contraindications  LDL goal < 70    Pamela GREWAL   Contact via Microsoft Teams during business hours  To reach covering provider access AMION via sunrise tools  For Urgent matters/after-hours/weekends/holidays please page endocrine fellow on call   For nonurgent matters please email [email or web address]    Please note that this patient may be followed by different provider tomorrow.  Notify endocrine 24 hours prior to discharge for final recommendations
LDL goal <70 due to DM  Pt LDL N/A  Order fasting lipid if not recently done  Statin as noted above   Manage per primary team   F/u levels as out pt.
Goal BP <130/80   Manage per primary team.

## 2023-09-29 NOTE — PROGRESS NOTE ADULT - ASSESSMENT
72 year old on chronic steroids for for COPD, and adrenal insufficiency asthma, TIA, bronchiectasias, TIA, recurrent VZV. Recently had a TAVR and was readmitted for sob and copd   Being treated by pulmonary and yesterday note  to have a rash on her right upper leg  Classic VZV rash localized.  no signs of dissemination    pt was treated with a short course of invanz for esbl proteus  in sputum and possible exacerbation of COPD  Last several days with fever and elevated wbc.  coughing but not alot worse than usual  no focal complains like dysuria, diarrhea  or abd pain             covid test positive but not sob or hypoxic at this point         remdesivir day 3  add decadron if  hypoxic   ct of the chest reviewed   no definite pna  we can dc INVANZ and continue remdesivir

## 2023-09-29 NOTE — PROGRESS NOTE ADULT - NSPROGADDITIONALINFOA_GEN_ALL_CORE
-Plan discussed with pt/team.  Contact info: [phone] (24/7). pager [phone]  Amion on [employer]  Teams on M-T-W-F. Unavailable Thu/Weekends/Holidays  Assessed pt/labs/meds and discussed plan of care with primary team  Adjusting insulin  Discharge plan  Follow up care
-Plan discussed with pt/team.  Contact info: [phone] (24/7). pager [phone]  Amion on [employer]  Teams on M-T-W-F. Unavailable Thu/Weekends/Holidays  Assessed pt/labs/meds and discussed plan of care with primary team  Adjusting insulin  Discharge plan  Follow up care
-Plan discussed with pt/team.  Contact info: [phone] (24/7). pager [phone]  Amion on [employer]  Teams on M-T-W-F. Unavailable Thu/Weekends/Holidays  Assessed pt/labs/meds and discussed plan of care with primary team  Adjusting insulin  Discharge plan  Follow up care
-Plan discussed with pt/team.  Contact info: [phone] (24/7). pager [phone]  Amion on [employer]  Teams on M-T-W-F. Unavailable Thu/Weekends/Holidays  Assessed pt/labs/meds and discussed plan of care with primary team  Adjusting insulin  Discharge plan  Follow up care
-Plan discussed with pt/team.  Contact info: [phone] (24/7). pager [phone]  Amion on [place]  Teams on M-T-W-F. Unavailable Thu/Weekends/Holidays  Assessed pt/labs/meds and discussed plan of care with primary team  Adjusting insulin  Discharge plan  Follow up care
-Plan discussed with pt/team.  Contact info: [phone] (24/7). pager [phone]  Amion on [place]  Teams on M-T-W-F. Unavailable Thu/Weekends/Holidays  Assessed pt/labs/meds and discussed plan of care with primary team  Adjusting insulin  Discharge plan  Follow up care
-Plan discussed with pt/team.  Contact info: [phone] (24/7). pager [phone]  Amion on [place]  Teams on M-T-W-F. Unavailable Thu/Weekends/Holidays  Assessed pt/labs/meds and discussed plan of care with primary team  Adjusting insulin  Discharge plan  Follow up care
-Plan discussed with pt/team.  Contact info: [phone] (24/7). pager [phone]  Amion on [place]  Teams on M-T-W-F. Unavailable Thu/Weekends/Holidays  Assessed pt/labs/meds and discussed plan of care with primary team  Adjusting insulin  Discharge plan  Follow up care

## 2023-09-29 NOTE — PROGRESS NOTE ADULT - PROBLEM SELECTOR PLAN 1
- Test BGs ACTID and at HS, q 6 hours if NPO  -Continue lantus 35units pre bed  -Increase admelog to 18units TID pre meals (as peak in BGs usually pre dinner) HOLD if NPO/skip meals   -Continue moderate ademlog correction scale ACTID and moderate correction scale at HS   -keep Hypoglycemia protocol in place   -Carb consistent diet   -Please notify endocrine if changes to methylprednisolone doses as insulin doses will also need to be changed accordingly     D/C planning   Patient to organize own private endo follow up  D/c on basal bolus, doses to be determined closer to time of discharge
Structural Team Following   Continue with Plavix 75 mg PO daily   Continue with Mexiletine 200 mg every 8 hours   Continue with Atorvastatin 20 mg PO HS    Daily EKG   Increase activity as tolerated.   Encourage Chest PT / Pulmonary toileting and Incentive spirometry every 1 hour x 10 while awake.   Continue with PUD and DVT prophylaxis.   Daily Shower   D/C plan home in AM   Plan of care discussed with attending
- Test BGs ACTID, hs and 2am OR q 6 hours if NPO  -Change lantus dose to 30 units at hs for now  -c/w admelog  16 units TID pre meals HOLD if NPO/skip meals OR eat less than 50 % of meals  -Continue moderate ademlog correction scale ACTID and moderate correction scale at HS and 2am   -Please f/u Hypoglycemia protocol and document treatment  -Please notify endocrine if changes to methylprednisolone doses as insulin doses will also need to be changed accordingly   -Please add snack at night since pt has always eaten snack at home and while in prior hospitalizations.   D/C planning   Discharge:  Will be determined according to BG/insulin needs/PO intake and steroid therapy at time of discharge.  Plan to d/c on basal bolus. Doses TBD  Outpt. endo follow-up. Dr Saavdera  Outpt. optho, podiatry, micro/cr  Make sure pt has Rx for all DM supplies and insulin/ DM meds. Consider [employer] E 3 if going home.
- Test BGs ACTID, hs and 2am OR q 6 hours if NPO  -Change lantus dose to 34 units at hs for now  -Change admelog dose to 16-14-14 units TID pre meals while on MTP 40mg po.  HOLD if NPO/skip meals OR eat less than 50 % of meals  -C/w Admelog 4 units with snack at night.   -Continue moderate ADMELOG correction scale ACTID and moderate correction scale at HS and 2am in case of rebound hyperglycemia  -Please notify endocrine if changes to methylprednisolone doses as insulin doses will also need to be changed accordingly!!  Discharge:  Will be determined according to BG/insulin needs/PO intake and steroid therapy at time of discharge.  Plan to d/c on basal bolus. Doses as above if pt goes home today. Per team pt is cleared by medicine to go home today but due to SOB with PT will re-evaluate tomorrow. Per pt, she can't go to rehab because she has no more rehab days for this year according to her insurance.   Outpt. endo follow-up. Dr Saavedra  Outpt. optho, podiatry, micro/cr  Make sure pt has Rx for all DM supplies and insulin/ DM meds. Consider [employer] 3 if going home. Need to place keshia on phone to make sure CGM is compatible. Pt aware> will have daughter do it.
- Test BGs ACTID, hs and 2am OR q 6 hours if NPO  -Change lantus dose to 34 units at hs for now  -Change admelog dose to 24-18-16 units TID pre meals HOLD if NPO/skip meals OR eat less than 50 % of meals  -C/w Admelog 4 units with snack at night.   -Continue moderate ademlog correction scale ACTID and moderate correction scale at HS and 2am   -Please f/u Hypoglycemia protocol and document treatment  -Please notify endocrine if changes to methylprednisolone doses as insulin doses will also need to be changed accordingly   -Please add Admelog 4 units with bedtime snack for now. HOLD IF NOT EATING>   Discharge:  Will be determined according to BG/insulin needs/PO intake and steroid therapy at time of discharge.  Plan to d/c on basal bolus. Doses TBD  Outpt. endo follow-up. Dr Saavedra  Outpt. optho, podiatry, micro/cr  Make sure pt has Rx for all DM supplies and insulin/ DM meds. Consider [employer] 3 if going home. Need to place keshia on phone to make sure CGM is compatible. Pt aware> will have daughter do it.
- Test BGs ACTID, hs and 2am OR q 6 hours if NPO  -Change lantus dose to 32 units at hs for now  -c/w admelog dose to 20-18-16 units TID pre meals HOLD if NPO/skip meals OR eat less than 50 % of meals  -Continue moderate ademlog correction scale ACTID and moderate correction scale at HS and 2am   -Please f/u Hypoglycemia protocol and document treatment  -Please notify endocrine if changes to methylprednisolone doses as insulin doses will also need to be changed accordingly   -Please add Admelog 4 units with bedtime snack for now. HOLD IF NOT EATING>   Discharge:  Will be determined according to BG/insulin needs/PO intake and steroid therapy at time of discharge.  Plan to d/c on basal bolus. Doses TBD  Outpt. endo follow-up. Dr Saavedra  Outpt. optho, podiatry, micro/cr  Make sure pt has Rx for all DM supplies and insulin/ DM meds. Consider [employer] Luis E 3 if going home. Need to place keshia on phone to make sure CGM is compatible. Pt aware> will have daughter do it.
- Test BGs ACTID, hs and 2am OR q 6 hours if NPO  -Change lantus dose to 34 units at hs for now  -Change admelog dose to 24-18-16 units TID pre meals HOLD if NPO/skip meals OR eat less than 50 % of meals  -C/w Admelog 4 units with snack at night.   -Continue moderate ademlog correction scale ACTID and moderate correction scale at HS and 2am   -Please f/u Hypoglycemia protocol and document treatment  -Please notify endocrine if changes to methylprednisolone doses as insulin doses will also need to be changed accordingly   -Please add Admelog 4 units with bedtime snack for now. HOLD IF NOT EATING>   Discharge:  Will be determined according to BG/insulin needs/PO intake and steroid therapy at time of discharge.  Plan to d/c on basal bolus. Doses TBD  Outpt. endo follow-up. Dr Saavedra  Outpt. optho, podiatry, micro/cr  Make sure pt has Rx for all DM supplies and insulin/ DM meds. Consider [employer] 3 if going home. Need to place keshia on phone to make sure CGM is compatible. Pt aware> will have daughter do it.
- Test BGs ACTID, hs and 2am OR q 6 hours if NPO  -Changed lantus dose to 18 units at hs for now  -c/w admelog dose to 12-10-12 units TID pre meals while on MTP 40mg po. PLEASE HOLD if NPO/skip meals OR eat less than 50 % of meals  -C/w Admelog 4 units with snack at night.   -Continue moderate ADMELOG correction scale ACTID and moderate correction scale at HS and 2am in case of rebound hyperglycemia  -Please notify endocrine if changes to methylprednisolone doses as insulin doses will also need to be changed accordingly!!  Discharge:  Will be determined according to BG/insulin needs/PO intake and steroid therapy at time of discharge.  Plan to d/c on basal bolus. Doses as above if pt goes home today. Per team pt is cleared by medicine to go home today but due to SOB with PT will re-evaluate tomorrow. Per pt, she can't go to rehab because she has no more rehab days for this year according to her insurance.   Outpt. endo follow-up. Dr Saavedra  Outpt. optho, podiatry, micro/cr  Make sure pt has Rx for all DM supplies and insulin/ DM meds. Consider [employer] 3 if going home. Need to place keshia on phone to make sure CGM is compatible. Pt aware> will have daughter do it.
- Test BGs ACTID, hs and 2am OR q 6 hours if NPO  -Changed lantus dose to 22 units at hs for now  -Adjust admelog dose to 12-10-12 units TID pre meals while on MTP 40mg po. PLEASE HOLD if NPO/skip meals OR eat less than 50 % of meals  -C/w Admelog 4 units with snack at night.   -Continue moderate ADMELOG correction scale ACTID and moderate correction scale at HS and 2am in case of rebound hyperglycemia  -Please notify endocrine if changes to methylprednisolone doses as insulin doses will also need to be changed accordingly!!  Discharge:  Will be determined according to BG/insulin needs/PO intake and steroid therapy at time of discharge.  Plan to d/c on basal bolus. Doses as above if pt goes home today. Per team pt is cleared by medicine to go home today but due to SOB with PT will re-evaluate tomorrow. Per pt, she can't go to rehab because she has no more rehab days for this year according to her insurance.   Outpt. endo follow-up. Dr Saavedra  Outpt. optho, podiatry, micro/cr  Make sure pt has Rx for all DM supplies and insulin/ DM meds. Consider [employer] GIe 3 if going home. Need to place keshia on phone to make sure CGM is compatible. Pt aware> will have daughter do it.
- Test BGs ACTID, hs and 2am OR q 6 hours if NPO  -C/w lantus dose 18 units at hs for now  -Adjust admelog dose to 12-14-14 units TID pre meals while on MTP 40mg po. PLEASE HOLD if NPO/skip meals OR eat less than 50 % of meals  -C/w Admelog 4 units with snack at night. Pt not taking it all the time  -Continue moderate ADMELOG correction scale ACTID and moderate correction scale at HS and 2am in case of rebound hyperglycemia  -Please notify endocrine if changes to methylprednisolone doses as insulin doses will also need to be changed accordingly!!  Discharge:  Will be determined according to BG/insulin needs/PO intake and steroid therapy at time of discharge.  Plan to d/c over the weekdn so gave recs  to restart home regimen of Lantus 18 q hs and Humalog 15 ac meals. Pt adjusts insulin at home according to PO and BG levels.   Outpt. endo follow-up. Dr Saavedra  Outpt. optho, podiatry, micro/cr  Make sure pt has Rx for all DM supplies and insulin/ DM meds. Consider [employer] 3 if going home. Need to place keshia on phone to make sure CGM is compatible. Pt aware> will have daughter do it. Per pt, her daughter was in hospital so can't come to see her. Pt to f/u with endo for keshia and CGM teaching. Pt needs daughter  to do it for her
- Test BGs ACTID, hs and 2am OR q 6 hours if NPO  -Changed lantus dose to 28 units at hs for now  -C/w admelog dose to 16-14-14 units TID pre meals while on MTP 40mg po. PLEASE HOLD if NPO/skip meals OR eat less than 50 % of meals  -C/w Admelog 4 units with snack at night.   -Continue moderate ADMELOG correction scale ACTID and moderate correction scale at HS and 2am in case of rebound hyperglycemia  -Please notify endocrine if changes to methylprednisolone doses as insulin doses will also need to be changed accordingly!!  Discharge:  Will be determined according to BG/insulin needs/PO intake and steroid therapy at time of discharge.  Plan to d/c on basal bolus. Doses as above if pt goes home today. Per team pt is cleared by medicine to go home today but due to SOB with PT will re-evaluate tomorrow. Per pt, she can't go to rehab because she has no more rehab days for this year according to her insurance.   Outpt. endo follow-up. Dr Saavedra  Outpt. optho, podiatry, micro/cr  Make sure pt has Rx for all DM supplies and insulin/ DM meds. Consider [email or web address] 3 if going home. Need to place keshia on phone to make sure CGM is compatible. Pt aware> will have daughter do it.
- Test BGs ACTID, hs and 2am OR q 6 hours if NPO  -Change lantus dose to 22 units at hs for now  -c/w admelog  9 units TID pre meals HOLD if NPO/skip meals OR BG <100   -Continue moderate ademlog correction scale ACTID and moderate correction scale at HS and 2am in case of rebound hyperglycemia.   -Please f/u Hypoglycemia protocol and document treatment  -Please notify endocrine if changes to methylprednisolone doses as insulin doses will also need to be changed accordingly   -Please add snack at night since pt has always eaten snack at home and while in prior hospitalizations.   D/C planning   Discharge:  Will be determined according to BG/insulin needs/PO intake and steroid therapy at time of discharge.  Plan to d/c on basal bolus. Doses TBD  Outpt. endo follow-up. Dr Saavedra  Outpt. optho, podiatry, micro/cr  Make sure pt has Rx for all DM supplies and insulin/ DM meds. Consider [employer] Luis E 3 if going home.
- Test BGs ACTID, hs and 2am OR q 6 hours if NPO  -Change lantus dose to 28 units at hs for now  -Deccrease admelog to 9 units TID pre meals HOLD if NPO/skip meals OR BG <100   -Continue moderate ademlog correction scale ACTID and moderate correction scale at HS and 2am in case of rebound hyperglycemia.   -Please f/u Hypoglycemia protocol and document treatment  -Please notify endocrine if changes to methylprednisolone doses as insulin doses will also need to be changed accordingly   -Please add snack at night since pt has always eaten snack at home and while in prior hospitalizations.   D/C planning   Discharge:  Will be determined according to BG/insulin needs/PO intake and steroid therapy at time of discharge.  Plan to d/c on basal bolus. Doses TBD  Outpt. endo follow-up. Dr Saavedra  Outpt. optho, podiatry, micro/cr  Make sure pt has Rx for all DM supplies and insulin/ DM meds. Consider [employer] Luis E 3 if going home.

## 2023-09-29 NOTE — PROGRESS NOTE ADULT - PROBLEM SELECTOR PLAN 4
Started on slow steroid taper to goal dose of Methylprednisolone 16mg /day.   Please inform team if pt having surgery or becomes critically ill because she would needs stress steroid doses.  F/u as out pt.  Discussed case with Dr Chavez about acute illness> per MD, pt already getting stress dose steroids so no need to adjust doses at this time. methylpred 40 mg daily until 09/30 is equivalent to Hydrocortisone 200 mg daily and that is good enough for stress dose.

## 2023-09-29 NOTE — DISCHARGE NOTE NURSING/CASE MANAGEMENT/SOCIAL WORK - PATIENT PORTAL LINK FT
You can access the FollowMyHealth Patient Portal offered by [facility] by registering at the following website: [email or web address]. By joining [facility]’s FollowMyHealth portal, you will also be able to view your health information using other applications (apps) compatible with our system.

## 2023-09-29 NOTE — CHART NOTE - NSCHARTNOTEFT_GEN_A_CORE
MEDICINE PA NOTE    Patient medically cleared for discharge, however unable to reach daughter to setup transport. Diabetes supplies (test strips and lancets) sent to [facility] as requested by her insurance; all other meds sent to [facility] and will be delivered to her home tomorrow morning.   Discharge notice given.

## 2023-09-29 NOTE — DISCHARGE NOTE NURSING/CASE MANAGEMENT/SOCIAL WORK - NSDCVIVACCINE_GEN_ALL_CORE_FT
COVID-19, mRNA, LNP-S, PF, 100 mcg/ 0.5 mL dose (Moderna); 06-Dec-2021 17:12; Afshan Lew (RADHA); [employer] [id number] (Exp. Date: 22-Dec-2021); IntraMuscular; Deltoid Left.; 0.25 milliLiter(s);

## 2023-09-29 NOTE — DISCHARGE NOTE NURSING/CASE MANAGEMENT/SOCIAL WORK - NSDCPEFALRISK_GEN_ALL_CORE
For information on Fall & Injury Prevention, visit: [email or web address] OR  [email or web address] OR  https://www.cdc.gov/steadi/patient.html

## 2023-09-29 NOTE — PROGRESS NOTE ADULT - PROBLEM SELECTOR PLAN 3
LDL goal <70 due to DM  Pt LDL N/A  Order fasting lipid if not recently done  Statin as noted above   Manage per primary team   F/u levels as out pt.
LDL goal <70 due to DM  Pt LDL N/A  Order fasting lipid if not recently done  Statin as noted above   Manage per primary team   F/u levels as out pt.      Pamela Porter NPLorenzoC   Contact via Microsoft Teams during business hours  To reach covering provider access AMION via sunrise tools  For Urgent matters/after-hours/weekends/holidays please page endocrine fellow on call   For nonurgent matters please email [email or web address]    Please note that this patient may be followed by different provider tomorrow.  Notify endocrine 24 hours prior to discharge for final recommendations
Goal BP <130/80   Manage per primary team
LDL goal <70 due to DM  Pt LDL N/A  Order fasting lipid if not recently done  Statin as noted above   Manage per primary team   F/u levels as out pt.
Goal BP <130/80   Manage per primary team.

## 2023-09-29 NOTE — DISCHARGE NOTE NURSING/CASE MANAGEMENT/SOCIAL WORK - NSDCFUADDAPPT_GEN_ALL_CORE_FT
APPTS ARE READY TO BE MADE: [X] YES    Best Family or Patient Contact (if needed):    Additional Information about above appointments (if needed):    1:   2:   3:     Other comments or requests:       Patient was previously scheduled for an appointment on  10/26 2pm at [address] [facility] with Dr. Panchal.  Patient was scheduled for an appointment on  10/12 10:30am at [address] with Dr. Glover. Patient/Caregiver was advised of appointment details.    Patient was scheduled for an appointment on  10/16 2pm at [address] [facility] with . Patient/Caregiver was advised of appointment details.    Provider Dr. Allison office was contacted to secure an appointment, however the office will follow up with the patient/caregiver directly.

## 2023-09-30 LAB
ALBUMIN SERPL ELPH-MCNC: 2.8 G/DL — LOW (ref 3.3–5)
ALP SERPL-CCNC: 63 U/L — SIGNIFICANT CHANGE UP (ref 40–120)
ALT FLD-CCNC: 21 U/L — SIGNIFICANT CHANGE UP (ref 10–45)
AST SERPL-CCNC: 16 U/L — SIGNIFICANT CHANGE UP (ref 10–40)
BILIRUB DIRECT SERPL-MCNC: <0.1 MG/DL — SIGNIFICANT CHANGE UP (ref 0–0.3)
BILIRUB INDIRECT FLD-MCNC: >0.1 MG/DL — LOW (ref 0.2–1)
BILIRUB SERPL-MCNC: 0.2 MG/DL — SIGNIFICANT CHANGE UP (ref 0.2–1.2)
CREAT SERPL-MCNC: 0.54 MG/DL — SIGNIFICANT CHANGE UP (ref 0.5–1.3)
CULTURE RESULTS: SIGNIFICANT CHANGE UP
EGFR: 96 ML/MIN/1.73M2 — SIGNIFICANT CHANGE UP
GLUCOSE BLDC GLUCOMTR-MCNC: 169 MG/DL — HIGH (ref 70–99)
GLUCOSE BLDC GLUCOMTR-MCNC: 199 MG/DL — HIGH (ref 70–99)
GLUCOSE BLDC GLUCOMTR-MCNC: 277 MG/DL — HIGH (ref 70–99)
GLUCOSE BLDC GLUCOMTR-MCNC: 296 MG/DL — HIGH (ref 70–99)
GLUCOSE BLDC GLUCOMTR-MCNC: 362 MG/DL — HIGH (ref 70–99)
INR BLD: 3.1 RATIO — HIGH (ref 0.85–1.18)
PROT SERPL-MCNC: 5.3 G/DL — LOW (ref 6–8.3)
PROTHROM AB SERPL-ACNC: 31.5 SEC — HIGH (ref 9.5–13)
SPECIMEN SOURCE: SIGNIFICANT CHANGE UP

## 2023-09-30 RX ORDER — WARFARIN SODIUM 2.5 MG/1
1 TABLET ORAL
Qty: 1 | Refills: 0
Start: 2023-09-30 | End: 2023-09-30

## 2023-09-30 RX ORDER — FLUTICASONE FUROATE AND VILANTEROL TRIFENATATE 100; 25 UG/1; UG/1
1 POWDER RESPIRATORY (INHALATION)
Qty: 1 | Refills: 0
Start: 2023-09-30 | End: 2023-10-29

## 2023-09-30 RX ORDER — ALBUTEROL 90 UG/1
2.5 AEROSOL, METERED ORAL
Qty: 360 | Refills: 0
Start: 2023-09-30 | End: 2023-10-29

## 2023-09-30 RX ORDER — ALBUTEROL 90 UG/1
2 AEROSOL, METERED ORAL
Qty: 1 | Refills: 0
Start: 2023-09-30 | End: 2023-10-29

## 2023-09-30 RX ORDER — ALBUTEROL 90 UG/1
2.5 AEROSOL, METERED ORAL
Qty: 1 | Refills: 0
Start: 2023-09-30 | End: 2023-10-29

## 2023-09-30 RX ORDER — WARFARIN SODIUM 2.5 MG/1
4 TABLET ORAL ONCE
Refills: 0 | Status: DISCONTINUED | OUTPATIENT
Start: 2023-09-30 | End: 2023-09-30

## 2023-09-30 RX ORDER — WARFARIN SODIUM 2.5 MG/1
1 TABLET ORAL
Qty: 5 | Refills: 0
Start: 2023-09-30 | End: 2023-10-04

## 2023-09-30 RX ORDER — FLUTICASONE FUROATE AND VILANTEROL TRIFENATATE 100; 25 UG/1; UG/1
1 POWDER RESPIRATORY (INHALATION)
Qty: 1 | Refills: 0 | DISCHARGE
Start: 2023-09-30 | End: 2023-10-29

## 2023-09-30 RX ADMIN — MEXILETINE HYDROCHLORIDE 200 MILLIGRAM(S): 150 CAPSULE ORAL at 13:54

## 2023-09-30 RX ADMIN — Medication 2 MILLILITER(S): at 13:54

## 2023-09-30 RX ADMIN — PANTOPRAZOLE SODIUM 40 MILLIGRAM(S): 20 TABLET, DELAYED RELEASE ORAL at 12:16

## 2023-09-30 RX ADMIN — MEXILETINE HYDROCHLORIDE 200 MILLIGRAM(S): 150 CAPSULE ORAL at 06:44

## 2023-09-30 RX ADMIN — TIOTROPIUM BROMIDE 2 PUFF(S): 18 CAPSULE ORAL; RESPIRATORY (INHALATION) at 12:16

## 2023-09-30 RX ADMIN — Medication 100 MILLIGRAM(S): at 13:54

## 2023-09-30 RX ADMIN — ATORVASTATIN CALCIUM 20 MILLIGRAM(S): 80 TABLET, FILM COATED ORAL at 21:46

## 2023-09-30 RX ADMIN — BUDESONIDE AND FORMOTEROL FUMARATE DIHYDRATE 2 PUFF(S): 160; 4.5 AEROSOL RESPIRATORY (INHALATION) at 17:56

## 2023-09-30 RX ADMIN — Medication 100 MILLIGRAM(S): at 21:44

## 2023-09-30 RX ADMIN — Medication 2: at 02:31

## 2023-09-30 RX ADMIN — Medication 3 MILLILITER(S): at 10:12

## 2023-09-30 RX ADMIN — GABAPENTIN 100 MILLIGRAM(S): 400 CAPSULE ORAL at 13:54

## 2023-09-30 RX ADMIN — Medication 10: at 17:46

## 2023-09-30 RX ADMIN — Medication 6: at 12:15

## 2023-09-30 RX ADMIN — Medication 14 UNIT(S): at 17:46

## 2023-09-30 RX ADMIN — INSULIN GLARGINE 18 UNIT(S): 100 INJECTION, SOLUTION SUBCUTANEOUS at 21:44

## 2023-09-30 RX ADMIN — CLOPIDOGREL BISULFATE 75 MILLIGRAM(S): 75 TABLET, FILM COATED ORAL at 12:15

## 2023-09-30 RX ADMIN — Medication 3 MILLILITER(S): at 17:56

## 2023-09-30 RX ADMIN — SENNA PLUS 2 TABLET(S): 8.6 TABLET ORAL at 21:44

## 2023-09-30 RX ADMIN — CHLORHEXIDINE GLUCONATE 1 APPLICATION(S): 213 SOLUTION TOPICAL at 06:43

## 2023-09-30 RX ADMIN — Medication 14 UNIT(S): at 12:15

## 2023-09-30 RX ADMIN — Medication 200 MILLIGRAM(S): at 17:56

## 2023-09-30 RX ADMIN — Medication 600 MILLIGRAM(S): at 06:42

## 2023-09-30 RX ADMIN — Medication 40 MILLIGRAM(S): at 06:40

## 2023-09-30 RX ADMIN — Medication 500 MILLIGRAM(S): at 12:15

## 2023-09-30 RX ADMIN — Medication 200 MILLIGRAM(S): at 12:15

## 2023-09-30 RX ADMIN — GABAPENTIN 100 MILLIGRAM(S): 400 CAPSULE ORAL at 21:44

## 2023-09-30 RX ADMIN — GABAPENTIN 100 MILLIGRAM(S): 400 CAPSULE ORAL at 06:40

## 2023-09-30 RX ADMIN — Medication 1 TABLET(S): at 12:16

## 2023-09-30 RX ADMIN — Medication 2: at 08:38

## 2023-09-30 RX ADMIN — Medication 100 MILLIGRAM(S): at 06:41

## 2023-09-30 RX ADMIN — MEXILETINE HYDROCHLORIDE 200 MILLIGRAM(S): 150 CAPSULE ORAL at 21:44

## 2023-09-30 RX ADMIN — Medication 600 MILLIGRAM(S): at 17:56

## 2023-09-30 NOTE — PROGRESS NOTE ADULT - SUBJECTIVE AND OBJECTIVE BOX
DATE OF SERVICE: 09-30-23 @ 10:30    Patient is a 73y old  Female who presents with a chief complaint of sob (29 Sep 2023 16:05)      SUBJECTIVE / OVERNIGHT EVENTS:  No chest pain. No shortness of breath. No complaints. No events overnight.     MEDICATIONS  (STANDING):  acetylcysteine 10%  Inhalation 2 milliLiter(s) Inhalation every 8 hours  albuterol/ipratropium for Nebulization 3 milliLiter(s) Nebulizer every 6 hours  ascorbic acid 500 milliGRAM(s) Oral daily  atorvastatin 20 milliGRAM(s) Oral at bedtime  benzonatate 100 milliGRAM(s) Oral three times a day  budesonide  80 MICROgram(s)/formoterol 4.5 MICROgram(s) Inhaler 2 Puff(s) Inhalation two times a day  chlorhexidine 2% Cloths 1 Application(s) Topical <User Schedule>  clopidogrel Tablet 75 milliGRAM(s) Oral daily  dextrose 5%. 1000 milliLiter(s) (50 mL/Hr) IV Continuous <Continuous>  dextrose 5%. 1000 milliLiter(s) (100 mL/Hr) IV Continuous <Continuous>  dextrose 50% Injectable 25 Gram(s) IV Push once  dextrose 50% Injectable 12.5 Gram(s) IV Push once  dextrose 50% Injectable 25 Gram(s) IV Push once  gabapentin 100 milliGRAM(s) Oral every 8 hours  glucagon  Injectable 1 milliGRAM(s) IntraMuscular once  guaiFENesin  milliGRAM(s) Oral every 12 hours  guaiFENesin Oral Liquid (Sugar-Free) 200 milliGRAM(s) Oral every 6 hours  hydrocortisone 1% Cream 1 Application(s) Topical two times a day  insulin glargine Injectable (LANTUS) 18 Unit(s) SubCutaneous at bedtime  insulin lispro (ADMELOG) corrective regimen sliding scale   SubCutaneous three times a day before meals  insulin lispro (ADMELOG) corrective regimen sliding scale   SubCutaneous <User Schedule>  insulin lispro Injectable (ADMELOG) 14 Unit(s) SubCutaneous before dinner  insulin lispro Injectable (ADMELOG) 14 Unit(s) SubCutaneous before lunch  insulin lispro Injectable (ADMELOG) 12 Unit(s) SubCutaneous before breakfast  insulin lispro Injectable (ADMELOG) 4 Unit(s) SubCutaneous at bedtime  lidocaine   4% Patch 2 Patch Transdermal daily  mexiletine 200 milliGRAM(s) Oral every 8 hours  multivitamin 1 Tablet(s) Oral daily  pantoprazole    Tablet 40 milliGRAM(s) Oral daily  polyethylene glycol 3350 17 Gram(s) Oral two times a day  remdesivir  IVPB 100 milliGRAM(s) IV Intermittent every 24 hours  remdesivir  IVPB   IV Intermittent   senna 2 Tablet(s) Oral at bedtime  tiotropium 2.5 MICROgram(s) Inhaler 2 Puff(s) Inhalation daily    MEDICATIONS  (PRN):  acetaminophen     Tablet .. 650 milliGRAM(s) Oral every 6 hours PRN Temp greater or equal to 38C (100.4F), Mild Pain (1 - 3)  dextrose Oral Gel 15 Gram(s) Oral once PRN Blood Glucose LESS THAN 70 milliGRAM(s)/deciliter  diphenhydrAMINE 25 milliGRAM(s) Oral every 4 hours PRN Rash and/or Itching  simethicone 80 milliGRAM(s) Chew every 12 hours PRN Gas      Vital Signs Last 24 Hrs  T(C): 36.5 (30 Sep 2023 04:00), Max: 36.7 (29 Sep 2023 20:29)  T(F): 97.7 (30 Sep 2023 04:00), Max: 98.1 (29 Sep 2023 20:29)  HR: 81 (30 Sep 2023 04:00) (78 - 81)  BP: 143/69 (30 Sep 2023 04:00) (100/60 - 143/69)  BP(mean): --  RR: 18 (30 Sep 2023 04:00) (18 - 18)  SpO2: 98% (30 Sep 2023 04:00) (98% - 98%)    Parameters below as of 30 Sep 2023 04:00  Patient On (Oxygen Delivery Method): room air      CAPILLARY BLOOD GLUCOSE      POCT Blood Glucose.: 199 mg/dL (30 Sep 2023 08:22)  POCT Blood Glucose.: 277 mg/dL (30 Sep 2023 02:06)  POCT Blood Glucose.: 311 mg/dL (29 Sep 2023 21:49)  POCT Blood Glucose.: 316 mg/dL (29 Sep 2023 17:17)  POCT Blood Glucose.: 115 mg/dL (29 Sep 2023 11:44)    I&O's Summary    29 Sep 2023 07:01  -  30 Sep 2023 07:00  --------------------------------------------------------  IN: 540 mL / OUT: 0 mL / NET: 540 mL        PHYSICAL EXAM:  GENERAL: NAD, well-developed  HEAD:  Atraumatic, Normocephalic  EYES: EOMI, PERRLA, conjunctiva and sclera clear  NECK: Supple, No JVD  CHEST/LUNG: Clear to auscultation bilaterally; No wheeze  HEART: Regular rate and rhythm; No murmurs, rubs, or gallops  ABDOMEN: Soft, Nontender, Nondistended; Bowel sounds present  EXTREMITIES:  2+ Peripheral Pulses, No clubbing, cyanosis, or edema  PSYCH: AAOx3  NEUROLOGY: non-focal  SKIN: No rashes or lesions    LABS:                        9.4    21.20 )-----------( 294      ( 29 Sep 2023 11:38 )             31.3     09-29    140  |  105  |  24<H>  ----------------------------<  124<H>  4.6   |  25  |  0.57    Ca    8.5      29 Sep 2023 11:37  Phos  2.4     09-28  Mg     2.1     09-28    TPro  5.4<L>  /  Alb  3.0<L>  /  TBili  0.2  /  DBili  <0.1  /  AST  16  /  ALT  22  /  AlkPhos  64  09-29    PT/INR - ( 29 Sep 2023 11:38 )   PT: 30.4 sec;   INR: 2.85 ratio         PTT - ( 28 Sep 2023 11:17 )  PTT:36.1 sec      Urinalysis Basic - ( 29 Sep 2023 11:37 )    Color: x / Appearance: x / SG: x / pH: x  Gluc: 124 mg/dL / Ketone: x  / Bili: x / Urobili: x   Blood: x / Protein: x / Nitrite: x   Leuk Esterase: x / RBC: x / WBC x   Sq Epi: x / Non Sq Epi: x / Bacteria: x    < from: CT Chest No Cont (09.28.23 @ 14:00) >  FINDINGS:    LUNGS, AIRWAYS AND PLEURA: Patent central airways. Overall increased   distal airway impaction with clustered nodules and groundglass opacities   in the lateral right middle lobe, lingula and bilateral lower lobes.   Bandlike areas of atelectasis. Trace pleural effusions, new on the right.   No pneumothorax.    MEDIASTINUM AND MARANDA: No lymphadenopathy.    HEART/VESSELS: Heart size is normal. No pericardial effusion. Status post   ascending aortic and aortic valve replacement with interval   valve-in-valve TAVR. Known residual dissection involving the   thoracoabdominal aorta is better seen on recent contrast-enhanced CT from   9/1/2023.    VISUALIZED UPPER ABDOMEN: Few small stable pancreatic hypoattenuating   cysts. Stable partially imaged complex left renal cyst with mild   peripheral calcifications.    BONES: Degenerative changes. Several densely sclerotic foci throughout   the thoracic vertebral bodies may represent bone islands. Sternotomy   wires are midline and intact.      IMPRESSION:  Status post valve in valve TAVR. Increased multifocal distal airway   impaction with associated clusters of nodules and bandlike atelectasis.      < end of copied text >      RADIOLOGY & ADDITIONAL TESTS:    Imaging Personally Reviewed:    Consultant(s) Notes Reviewed:      Care Discussed with Consultants/Other Providers:

## 2023-09-30 NOTE — CHART NOTE - NSCHARTNOTEFT_GEN_A_CORE
Patient on coumadin for AFIB. INR today 3.1. Discussed with Dr. Carine shaw to hold coumadin for tonight then continue on 5mg daily. Patient should follow up with Dr. Glover office to have blood work done within one week.

## 2023-10-01 VITALS
OXYGEN SATURATION: 96 % | SYSTOLIC BLOOD PRESSURE: 143 MMHG | HEART RATE: 77 BPM | DIASTOLIC BLOOD PRESSURE: 86 MMHG | TEMPERATURE: 98 F | RESPIRATION RATE: 18 BRPM

## 2023-10-01 PROBLEM — Z92.3 HISTORY OF RADIATION THERAPY: Status: ACTIVE | Noted: 2019-05-03

## 2023-10-01 LAB
ALBUMIN SERPL ELPH-MCNC: 2.9 G/DL — LOW (ref 3.3–5)
ALP SERPL-CCNC: 63 U/L — SIGNIFICANT CHANGE UP (ref 40–120)
ALT FLD-CCNC: 22 U/L — SIGNIFICANT CHANGE UP (ref 10–45)
AST SERPL-CCNC: 17 U/L — SIGNIFICANT CHANGE UP (ref 10–40)
BILIRUB DIRECT SERPL-MCNC: <0.1 MG/DL — SIGNIFICANT CHANGE UP (ref 0–0.3)
BILIRUB INDIRECT FLD-MCNC: >0 MG/DL — LOW (ref 0.2–1)
BILIRUB SERPL-MCNC: 0.1 MG/DL — LOW (ref 0.2–1.2)
CREAT SERPL-MCNC: 0.54 MG/DL — SIGNIFICANT CHANGE UP (ref 0.5–1.3)
EGFR: 96 ML/MIN/1.73M2 — SIGNIFICANT CHANGE UP
GLUCOSE BLDC GLUCOMTR-MCNC: 130 MG/DL — HIGH (ref 70–99)
GLUCOSE BLDC GLUCOMTR-MCNC: 223 MG/DL — HIGH (ref 70–99)
GLUCOSE BLDC GLUCOMTR-MCNC: 300 MG/DL — HIGH (ref 70–99)
INR BLD: 2.74 RATIO — HIGH (ref 0.85–1.18)
PROT SERPL-MCNC: 5.3 G/DL — LOW (ref 6–8.3)
PROTHROM AB SERPL-ACNC: 29.3 SEC — HIGH (ref 9.5–13)

## 2023-10-01 RX ORDER — ROSUVASTATIN CALCIUM 5 MG/1
1 TABLET ORAL
Qty: 30 | Refills: 0
Start: 2023-10-01 | End: 2023-10-30

## 2023-10-01 RX ORDER — WARFARIN SODIUM 2.5 MG/1
1 TABLET ORAL
Qty: 14 | Refills: 0
Start: 2023-10-01 | End: 2023-10-14

## 2023-10-01 RX ORDER — SENNA PLUS 8.6 MG/1
2 TABLET ORAL
Qty: 60 | Refills: 0
Start: 2023-10-01 | End: 2023-10-30

## 2023-10-01 RX ADMIN — GABAPENTIN 100 MILLIGRAM(S): 400 CAPSULE ORAL at 13:51

## 2023-10-01 RX ADMIN — TIOTROPIUM BROMIDE 2 PUFF(S): 18 CAPSULE ORAL; RESPIRATORY (INHALATION) at 12:02

## 2023-10-01 RX ADMIN — CLOPIDOGREL BISULFATE 75 MILLIGRAM(S): 75 TABLET, FILM COATED ORAL at 12:01

## 2023-10-01 RX ADMIN — Medication 6: at 12:01

## 2023-10-01 RX ADMIN — Medication 3 MILLILITER(S): at 12:02

## 2023-10-01 RX ADMIN — Medication 200 MILLIGRAM(S): at 12:01

## 2023-10-01 RX ADMIN — MEXILETINE HYDROCHLORIDE 200 MILLIGRAM(S): 150 CAPSULE ORAL at 13:51

## 2023-10-01 RX ADMIN — Medication 200 MILLIGRAM(S): at 00:33

## 2023-10-01 RX ADMIN — Medication 32 MILLIGRAM(S): at 05:33

## 2023-10-01 RX ADMIN — Medication 4: at 08:16

## 2023-10-01 RX ADMIN — CHLORHEXIDINE GLUCONATE 1 APPLICATION(S): 213 SOLUTION TOPICAL at 05:35

## 2023-10-01 RX ADMIN — PANTOPRAZOLE SODIUM 40 MILLIGRAM(S): 20 TABLET, DELAYED RELEASE ORAL at 12:01

## 2023-10-01 RX ADMIN — Medication 100 MILLIGRAM(S): at 13:50

## 2023-10-01 RX ADMIN — Medication 3 MILLILITER(S): at 00:33

## 2023-10-01 RX ADMIN — MEXILETINE HYDROCHLORIDE 200 MILLIGRAM(S): 150 CAPSULE ORAL at 05:33

## 2023-10-01 RX ADMIN — Medication 500 MILLIGRAM(S): at 12:01

## 2023-10-01 RX ADMIN — Medication 100 MILLIGRAM(S): at 05:33

## 2023-10-01 RX ADMIN — Medication 14 UNIT(S): at 12:01

## 2023-10-01 RX ADMIN — Medication 1 TABLET(S): at 12:01

## 2023-10-01 RX ADMIN — GABAPENTIN 100 MILLIGRAM(S): 400 CAPSULE ORAL at 05:33

## 2023-10-01 NOTE — CHART NOTE - NSCHARTNOTEFT_GEN_A_CORE
Age: 73y    Gender: Female    POCT Blood Glucose:  300 mg/dL (10-01-23 @ 11:47)  223 mg/dL (10-01-23 @ 08:11)  130 mg/dL (10-01-23 @ 02:08)  169 mg/dL (09-30-23 @ 21:30)      eMAR:atorvastatin   20 milliGRAM(s) Oral (09-30-23 @ 21:46)    insulin glargine Injectable (LANTUS)   18 Unit(s) SubCutaneous (09-30-23 @ 21:44)    insulin lispro (ADMELOG) corrective regimen sliding scale   6  SubCutaneous (10-01-23 @ 12:01)   4  SubCutaneous (10-01-23 @ 08:16)    insulin lispro Injectable (ADMELOG)   14 Unit(s) SubCutaneous (10-01-23 @ 12:01)    methylPREDNISolone   32 milliGRAM(s) Oral (10-01-23 @ 05:33)    72 F w/h/o uncontrolled T2DM (A1C 9.4%) on basal/bolus insulin PTA. Unknown DM complications. Also COPD, secondary adrenal Insufficiency on chronic steroids, colorectal cancer s/p resection (colostomy bag), Chronic A fib on Eliquis, and tracheomalacia and multiple intubations, type A aortic dissection s/p aortic dissection repair on 9/07/22, sepsis. Pt well known to this provider from prior admissions. Pt recently admitted with SOB and found to have anemia and severe aortic stenosis>requiring valve in valve TAVR 9/7/23 discharged 9/10/23. Back with increasing productive cough, dyspnea and also found to have Herpes zoster completed antiviral tx. s/pMethylprednisolone 40mg mg po.   Endocrine consult for management of hyperglycemia. Tolerating POs with BG levels variable between 100s to 300 in last 24 hours with inconsistent oral intake. Currently on Methylprednisolone taper 32 mg daily for 5 days, 24 mg for 5 days and 16 mg daily . Discharge today on steroid taper as per team      # DM2 (diabetes mellitus, type 2).   - Test BGs ACTID, hs and 2am OR q 6 hours if NPO  -C/w lantus dose 18 units at hs for now  -Adjust admelog dose to 12-16-14 units TID pre meals while on MTP 32mg po. PLEASE HOLD if NPO/skip meals OR eat less than 50 % of meals  -C/w Admelog 4 units with snack at night. Pt not taking it all the time  -Continue moderate ADMELOG correction scale ACTID and moderate correction scale at HS and 2am in case of rebound hyperglycemia  -Please notify endocrine if changes to methylprednisolone doses as insulin doses will also need to be changed accordingly!!    Discharge:  Recommend basal and bolus insulin regimen  Methylprednisolone 32 mg daily - Lantus 18 units at HS, Premeal Admelog 12-16-14  Methylprednisolone 24 mg daily- Lantus 16 units at HS, premeal Admelog  11-14-12  Methylprednisolone 16 mg daily - Lantus 14 units at HS, Premeal Ademlog 10-12-10   Patient to call PCP or endocrinologist if BG> 400X1, BG < 70 X1, or BGs> 200 X 2days  Follow up with endocrinologist Dr. Saavedra   Outpt. optho, podiatry, micro/cr  Make sure pt has Rx for all DM supplies and insulin/ DM meds.     Pamela MODIC   Contact via [employer] Teams during business hours  To reach covering provider access AMION via sun[employer] tools  For Urgent matters/after-hours/weekends/holidays please page endocrine fellow on call   For nonurgent matters please email [email or web address]    Please note that this patient may be followed by different provider tomorrow.  Notify endocrine 24 hours prior to discharge for final recommendations

## 2023-10-01 NOTE — PROGRESS NOTE ADULT - PROVIDER SPECIALTY LIST ADULT
Infectious Disease
Internal Medicine
Pulmonology
Pulmonology
Endocrinology
Infectious Disease
Infectious Disease
Internal Medicine
Podiatry
Pulmonology
Endocrinology
Endocrinology
Infectious Disease
Infectious Disease
Internal Medicine
Internal Medicine
Pulmonology
Endocrinology
Endocrinology
CT Surgery
Endocrinology

## 2023-10-01 NOTE — PROGRESS NOTE ADULT - ASSESSMENT
72 y.o. F with PMH of asthma on chronic steroids, bronchiectasis, allergic rhinitis,  recurrent PNA,  tracheomalacia s/p tracheoplasty, ESBL proteus infections (sputum),  diabetes, paroxysmal A-fib on coumadin, colorectal cancer many years ago status post colostomy, recent hospitalization at an outside hospital  for acute respiratory failure with hypoxia secondary to asthma/COPD exacerbation and bronchopneumonia 6/5/2023 - 6/16/2023), and AVR 2022 with Dr. Cabrera.  Pt admits to chronic SOB and cough for years and is up to date on vaccines. Dr. Allison follows as an outpatient for her COPD. s/p 9/6 TAVR- [facility] with Dr. Gonsalves and Dr. Leahy. discharged home 9/10. Pt presents to office today for f/u visit  w/ c/o of worsening SOB. Pt to be admitted for further workup and eval. Pt stable at this time. VSS; O2 sats 97% RA.  ,H/o  ,bronchiectasis, tracheomalacia s/p tracheoplasty who presents after a recent admission for bronchiectasis exacerbation (6/2023, treated with Ertapenem for ESBL proteus), and again in 9/2023 for an acute flare whose hospital course was complicated by identification of severe AS requiring valve in valve TAVR 9/7/23    fever and elevated WBC count  - seen by ID  - covid test positive  - chest ct scan  - bc urine culture  - remdesivir x 3 DAYS  - d/c invanz     bronchiectasis  9/13 Pulm eval /rec Routine sputum culture, urine strep/legionella, RVP, COVID  Considering culture history would start ertapenem and vancomycin for MRSA   Bronchodilator: Standing duonebs q 4 hours, nebulized pulmicort  - vest therapy  - mucous for culture and sensitivity done  - nebulized mucomyst  - Ensure she is getting twice per day airway clearance: Albuterol nebulizer followed by saline nebulizer followed by cough assist device and encouragement to cough and clear  - steroid taper to oral  as per pulm  - pt refusing prednisone as she has been on methylprednisolone since childhood  - ertapenam per ID - completed 5 days    cough  - Robitussin  - tessalon    S/P TAVR (transcatheter aortic valve replacement).   ·  Plan: Structural Team Following   Continue with Plavix 75 mg PO daily   Continue with Mexiletine 200 mg every 8 hours   Continue with Atorvastatin 20 mg PO HS    Daily EKG   Increase activity as tolerated.   Encourage Chest PT / Pulmonary toileting and Incentive spirometry every 1 hour x 10 while awake.   Continue with PUD and DVT prophylaxis.   Daily Shower     shingles  - valcyte for 7 days      Uncontrolled type 2 diabetes mellitus with hyperglycemia.   - Lantus 18 units sq hs   - ADMELOG as per endo  -  moderate correction scale ac meals and moderate correction hs and 2am.      Plan to d/c on basal bolus.   Outpt. endo follow-up. Dr Saavedra  Outpt. optho, podiatry, micro/cr  Consider Freesytle Luis E 3  upon discharge.    Essential hypertension.   ·  Plan: Goal BP <130/80   - cont current meds     Hyperlipidemia.   ·  Plan: LDL goal <70 due to DM  Pt LDL N/A  Order fasting lipid if not recently done  Statin as noted above     F/u levels as out pt.    H/O adrenal insufficiency.   ·  Plan: On chronic steroids at home      Atrial fibrillation.   ·  Plan: Continue with Mexiletine 200 mg every 8 hours   Daily PT/ INR and dose coumadin    dispo  - d/c planning home with home PT when stable  - follow up with pulm Dr Allison

## 2023-10-01 NOTE — CHART NOTE - NSCHARTNOTESELECT_GEN_ALL_CORE
Endocrine follow up/Event Note
Event Note
endocrine/Event Note
endocrinology/Event Note
Chest CT results/Event Note
Coumadin dosing/Event Note
Endocrine followup/Event Note
Endocrine/Event Note
Event Note
Event Note
Nutrition Services
endocrine follow up/Event Note

## 2023-10-01 NOTE — PROGRESS NOTE ADULT - SUBJECTIVE AND OBJECTIVE BOX
DATE OF SERVICE: 10-01-23 @ 09:13    Patient is a 73y old  Female who presents with a chief complaint of sob (30 Sep 2023 10:30)      SUBJECTIVE / OVERNIGHT EVENTS:  No chest pain. No shortness of breath. No complaints. No events overnight.     MEDICATIONS  (STANDING):  acetylcysteine 10%  Inhalation 2 milliLiter(s) Inhalation every 8 hours  albuterol/ipratropium for Nebulization 3 milliLiter(s) Nebulizer every 6 hours  ascorbic acid 500 milliGRAM(s) Oral daily  atorvastatin 20 milliGRAM(s) Oral at bedtime  benzonatate 100 milliGRAM(s) Oral three times a day  budesonide  80 MICROgram(s)/formoterol 4.5 MICROgram(s) Inhaler 2 Puff(s) Inhalation two times a day  chlorhexidine 2% Cloths 1 Application(s) Topical <User Schedule>  clopidogrel Tablet 75 milliGRAM(s) Oral daily  dextrose 5%. 1000 milliLiter(s) (100 mL/Hr) IV Continuous <Continuous>  dextrose 5%. 1000 milliLiter(s) (50 mL/Hr) IV Continuous <Continuous>  dextrose 50% Injectable 25 Gram(s) IV Push once  dextrose 50% Injectable 12.5 Gram(s) IV Push once  dextrose 50% Injectable 25 Gram(s) IV Push once  gabapentin 100 milliGRAM(s) Oral every 8 hours  glucagon  Injectable 1 milliGRAM(s) IntraMuscular once  guaiFENesin  milliGRAM(s) Oral every 12 hours  guaiFENesin Oral Liquid (Sugar-Free) 200 milliGRAM(s) Oral every 6 hours  hydrocortisone 1% Cream 1 Application(s) Topical two times a day  insulin glargine Injectable (LANTUS) 18 Unit(s) SubCutaneous at bedtime  insulin lispro (ADMELOG) corrective regimen sliding scale   SubCutaneous three times a day before meals  insulin lispro (ADMELOG) corrective regimen sliding scale   SubCutaneous <User Schedule>  insulin lispro Injectable (ADMELOG) 4 Unit(s) SubCutaneous at bedtime  insulin lispro Injectable (ADMELOG) 12 Unit(s) SubCutaneous before breakfast  insulin lispro Injectable (ADMELOG) 14 Unit(s) SubCutaneous before lunch  insulin lispro Injectable (ADMELOG) 14 Unit(s) SubCutaneous before dinner  lidocaine   4% Patch 2 Patch Transdermal daily  methylPREDNISolone 32 milliGRAM(s) Oral daily  mexiletine 200 milliGRAM(s) Oral every 8 hours  multivitamin 1 Tablet(s) Oral daily  pantoprazole    Tablet 40 milliGRAM(s) Oral daily  polyethylene glycol 3350 17 Gram(s) Oral two times a day  remdesivir  IVPB   IV Intermittent   remdesivir  IVPB 100 milliGRAM(s) IV Intermittent every 24 hours  senna 2 Tablet(s) Oral at bedtime  tiotropium 2.5 MICROgram(s) Inhaler 2 Puff(s) Inhalation daily    MEDICATIONS  (PRN):  acetaminophen     Tablet .. 650 milliGRAM(s) Oral every 6 hours PRN Temp greater or equal to 38C (100.4F), Mild Pain (1 - 3)  dextrose Oral Gel 15 Gram(s) Oral once PRN Blood Glucose LESS THAN 70 milliGRAM(s)/deciliter  diphenhydrAMINE 25 milliGRAM(s) Oral every 4 hours PRN Rash and/or Itching  simethicone 80 milliGRAM(s) Chew every 12 hours PRN Gas      Vital Signs Last 24 Hrs  T(C): 36.7 (01 Oct 2023 04:00), Max: 36.7 (30 Sep 2023 20:30)  T(F): 98.1 (01 Oct 2023 04:00), Max: 98.1 (01 Oct 2023 04:00)  HR: 73 (01 Oct 2023 04:00) (73 - 88)  BP: 147/72 (01 Oct 2023 04:00) (121/56 - 147/72)  BP(mean): --  RR: 18 (01 Oct 2023 04:00) (18 - 20)  SpO2: 99% (01 Oct 2023 04:00) (93% - 99%)    Parameters below as of 01 Oct 2023 04:00  Patient On (Oxygen Delivery Method): room air      CAPILLARY BLOOD GLUCOSE      POCT Blood Glucose.: 223 mg/dL (01 Oct 2023 08:11)  POCT Blood Glucose.: 130 mg/dL (01 Oct 2023 02:08)  POCT Blood Glucose.: 169 mg/dL (30 Sep 2023 21:30)  POCT Blood Glucose.: 362 mg/dL (30 Sep 2023 17:15)  POCT Blood Glucose.: 296 mg/dL (30 Sep 2023 12:02)    I&O's Summary    30 Sep 2023 07:01  -  01 Oct 2023 07:00  --------------------------------------------------------  IN: 600 mL / OUT: 0 mL / NET: 600 mL        PHYSICAL EXAM:  GENERAL: NAD, well-developed  HEAD:  Atraumatic, Normocephalic  EYES: EOMI, PERRLA, conjunctiva and sclera clear  NECK: Supple, No JVD  CHEST/LUNG: barry rhonchi unchabged  HEART: Regular rate and rhythm; No murmurs, rubs, or gallops  ABDOMEN: Soft, Nontender, Nondistended; Bowel sounds present  EXTREMITIES:  2+ Peripheral Pulses, No clubbing, cyanosis, or edema  PSYCH: AAOx3  NEUROLOGY: non-focal  SKIN: No rashes or lesions    LABS:                        9.4    21.20 )-----------( 294      ( 29 Sep 2023 11:38 )             31.3     09-30    x   |  x   |  x   ----------------------------<  x   x    |  x   |  0.54    Ca    8.5      29 Sep 2023 11:37    TPro  5.3<L>  /  Alb  2.8<L>  /  TBili  0.2  /  DBili  <0.1  /  AST  16  /  ALT  21  /  AlkPhos  63  09-30    PT/INR - ( 30 Sep 2023 11:39 )   PT: 31.5 sec;   INR: 3.10 ratio               Urinalysis Basic - ( 29 Sep 2023 11:37 )    Color: x / Appearance: x / SG: x / pH: x  Gluc: 124 mg/dL / Ketone: x  / Bili: x / Urobili: x   Blood: x / Protein: x / Nitrite: x   Leuk Esterase: x / RBC: x / WBC x   Sq Epi: x / Non Sq Epi: x / Bacteria: x        RADIOLOGY & ADDITIONAL TESTS:    Imaging Personally Reviewed:    Consultant(s) Notes Reviewed:      Care Discussed with Consultants/Other Providers:

## 2023-10-01 NOTE — PROGRESS NOTE ADULT - SUBJECTIVE AND OBJECTIVE BOX
IVERNIGHT EVENTS/SUBJECTIVE: Continues to feel as though she is not back to her baseline.  Still feels SOB walking to bathroom and unsteady in her gait as a result.  NO hypoxia noted on ambulatory pulse ox (O2Sat >95% with ambulation).  Also c/o of a tight band sensation across her abdomen.  Able to talk for long periods of time without any obvious dyspnea but still coughs with deep inspiration during auscultation.  Brought up more thick, white sputum for culture - responding to hypertonic saline. Complaining of constipation not responsive to miralax.      Allergies: tetanus toxoid (Short breath)  cefepime (Anaphylaxis)  penicillin (Anaphylaxis)  Avelox (Short breath; Pruritus)  Dilaudid (Short breath)  codeine (Short breath)  aspirin (Short breath)  iodine (Short breath; Swelling)  Valium (Short breath)  shellfish (Anaphylaxis)      Objective:  Vitals:   T(F): 98.1 (08-17-23 @ 13:17), Max: 99.6 (08-16-23 @ 20:48)  HR: 69 (08-17-23 @ 13:17) (68 - 85)  BP: 118/79 (08-17-23 @ 13:17) (103/56 - 154/84)  RR: 20 (08-17-23 @ 13:17) (18 - 24)  SpO2: 96% (08-17-23 @ 13:17) (96% - 100%)  Physical Examination:  General: no acute distress, RA  HEENT: NC/AT, anicteric, neck supple  Respiratory: no acc muscle use, breathing comfortably  Cardiovascular: S1 and S2 present  Gastrointestinal: normal appearing, nondistended  Extremities: no edema, no cyanosis  Skin: no visible rash    Laboratory Studies:  CBC:                       8.1    15.59 )-----------( 215      ( 17 Aug 2023 06:32 )             28.5     WBC Trend:  15.59 08-17-23 @ 06:32  13.87 08-16-23 @ 06:30  20.91 08-15-23 @ 07:04  19.37 08-14-23 @ 07:25  16.73 08-13-23 @ 07:21  9.05 08-12-23 @ 07:17  10.56 08-11-23 @ 16:05    CMP: 08-17    138  |  102  |  21  ----------------------------<  300<H>  5.1   |  25  |  0.72    Ca    8.5      17 Aug 2023 06:32  Phos  3.9     08-17  Mg     2.3     08-17    TPro  5.1<L>  /  Alb  3.3  /  TBili  0.5  /  DBili  x   /  AST  35  /  ALT  23  /  AlkPhos  72  08-17    Creatinine: 0.72 mg/dL (08-17-23 @ 06:32)  Creatinine: 0.73 mg/dL (08-16-23 @ 06:31)  Creatinine: 0.71 mg/dL (08-15-23 @ 07:02)  Creatinine: 0.76 mg/dL (08-14-23 @ 07:21)  Creatinine: 0.68 mg/dL (08-13-23 @ 07:22)  Creatinine: 0.67 mg/dL (08-12-23 @ 07:21)  Creatinine: 0.80 mg/dL (08-11-23 @ 16:05)    LIVER FUNCTIONS - ( 17 Aug 2023 06:32 )  Alb: 3.3 g/dL / Pro: 5.1 g/dL / ALK PHOS: 72 U/L / ALT: 23 U/L / AST: 35 U/L / GGT: x           Microbiology: reviewed     Culture - Sputum, Cystic Fibrosis (collected 08-17-23 @ 01:18)  Source: .Sputum Sputum  Gram Stain (08-17-23 @ 10:15):    Rare Squamous epithelial cells per low power field    No polymorphonuclear leukocytes per low power field    Rare Gram positive cocci in pairs per oil power field    Culture - Sputum (collected 08-15-23 @ 12:49)  Source: .Sputum Sputum  Gram Stain (08-15-23 @ 21:07):    Rare Squamous epithelial cells per low power field    Rare polymorphonuclear leukocytes per low power field    Rare Yeast like cells per oil power field  Preliminary Report (08-16-23 @ 17:45):    Normal Respiratory Jessica present    Culture - Acid Fast - Sputum w/Smear (collected 08-14-23 @ 13:11)  Source: .Sputum Sputum    Culture - Blood (collected 08-11-23 @ 16:00)  Source: .Blood Blood-Peripheral  Final Report (08-16-23 @ 20:00):    No growth at 5 days    Culture - Blood (collected 08-11-23 @ 15:15)  Source: .Blood Blood-Peripheral  Final Report (08-16-23 @ 20:00):    No growth at 5 days    SARS-CoV-2 Result: Juliate (11 Aug 2023 16:06)    Radiology: reviewed     Medications:  acetaminophen     Tablet .. 650 milliGRAM(s) Oral every 6 hours PRN  albuterol/ipratropium for Nebulization 3 milliLiter(s) Nebulizer every 6 hours  aluminum hydroxide/magnesium hydroxide/simethicone Suspension 30 milliLiter(s) Oral every 4 hours PRN  apixaban 5 milliGRAM(s) Oral every 12 hours  atorvastatin 20 milliGRAM(s) Oral at bedtime  buDESOnide    Inhalation Suspension 0.5 milliGRAM(s) Inhalation two times a day  chlorhexidine 4% Liquid 1 Application(s) Topical daily  dextrose 5%. 1000 milliLiter(s) IV Continuous <Continuous>  dextrose 5%. 1000 milliLiter(s) IV Continuous <Continuous>  dextrose 50% Injectable 25 Gram(s) IV Push once  dextrose 50% Injectable 12.5 Gram(s) IV Push once  dextrose 50% Injectable 25 Gram(s) IV Push once  dextrose Oral Gel 15 Gram(s) Oral once PRN  diphenhydrAMINE 25 milliGRAM(s) Oral daily PRN  ertapenem  IVPB 1000 milliGRAM(s) IV Intermittent every 24 hours  glucagon  Injectable 1 milliGRAM(s) IntraMuscular once  insulin glargine Injectable (LANTUS) 48 Unit(s) SubCutaneous at bedtime  insulin lispro (ADMELOG) corrective regimen sliding scale   SubCutaneous three times a day before meals  insulin lispro (ADMELOG) corrective regimen sliding scale   SubCutaneous at bedtime  insulin lispro Injectable (ADMELOG) 16 Unit(s) SubCutaneous three times a day with meals  melatonin 3 milliGRAM(s) Oral at bedtime PRN  methylPREDNISolone sodium succinate Injectable 40 milliGRAM(s) IV Push two times a day  mexiletine 200 milliGRAM(s) Oral every 8 hours  nystatin    Suspension 688094 Unit(s) Swish and Swallow two times a day  ondansetron Injectable 4 milliGRAM(s) IV Push every 8 hours PRN  polyethylene glycol 3350 17 Gram(s) Oral two times a day  sodium chloride 7% Inhalation 4 milliLiter(s) Inhalation every 6 hours  tiotropium 2.5 MICROgram(s) Inhaler 2 Puff(s) Inhalation daily  trimethoprim  160 mG/sulfamethoxazole 800 mG 1 Tablet(s) Oral daily    Current Antimicrobials:  ertapenem  IVPB 1000 milliGRAM(s) IV Intermittent every 24 hours  nystatin    Suspension 059415 Unit(s) Swish and Swallow two times a day  trimethoprim  160 mG/sulfamethoxazole 800 mG 1 Tablet(s) Oral daily    Prior/Completed Antimicrobials:  ertapenem  IVPB   Yes

## 2023-10-01 NOTE — PROGRESS NOTE ADULT - REASON FOR ADMISSION
sob

## 2023-10-03 ENCOUNTER — LABORATORY RESULT (OUTPATIENT)
Age: 73
End: 2023-10-03

## 2023-10-03 ENCOUNTER — APPOINTMENT (OUTPATIENT)
Dept: PULMONOLOGY | Facility: CLINIC | Age: 73
End: 2023-10-03
Payer: MEDICARE

## 2023-10-03 VITALS
WEIGHT: 145 LBS | OXYGEN SATURATION: 97 % | RESPIRATION RATE: 17 BRPM | HEART RATE: 89 BPM | HEIGHT: 67 IN | DIASTOLIC BLOOD PRESSURE: 80 MMHG | BODY MASS INDEX: 22.76 KG/M2 | TEMPERATURE: 97.4 F | SYSTOLIC BLOOD PRESSURE: 158 MMHG

## 2023-10-03 DIAGNOSIS — J45.909 UNSPECIFIED ASTHMA, UNCOMPLICATED: ICD-10-CM

## 2023-10-03 DIAGNOSIS — R93.89 ABNORMAL FINDINGS ON DIAGNOSTIC IMAGING OF OTHER SPECIFIED BODY STRUCTURES: ICD-10-CM

## 2023-10-03 LAB
CULTURE RESULTS: SIGNIFICANT CHANGE UP
SPECIMEN SOURCE: SIGNIFICANT CHANGE UP

## 2023-10-03 PROCEDURE — 96372 THER/PROPH/DIAG INJ SC/IM: CPT

## 2023-10-03 PROCEDURE — 99214 OFFICE O/P EST MOD 30 MIN: CPT | Mod: 25

## 2023-10-03 RX ORDER — TEZEPELUMAB-EKKO 210 MG/1.9ML
210 INJECTION, SOLUTION SUBCUTANEOUS
Qty: 0 | Refills: 0 | Status: COMPLETED | OUTPATIENT
Start: 2023-10-03

## 2023-10-03 RX ORDER — SCOPOLAMINE 1.5 MG/1
1 PATCH, EXTENDED RELEASE TRANSDERMAL
Qty: 10 | Refills: 2 | Status: ACTIVE | COMMUNITY
Start: 2023-10-03 | End: 1900-01-01

## 2023-10-03 RX ADMIN — TEZEPELUMAB-EKKO 0 MG/1.91ML: 210 INJECTION, SOLUTION SUBCUTANEOUS at 00:00

## 2023-10-04 RX ORDER — GUAIFENESIN/DEXTROMETHORPHAN 100-10MG/5
100-10 LIQUID (ML) ORAL EVERY 4 HOURS
Qty: 1 | Refills: 3 | Status: DISCONTINUED | COMMUNITY
Start: 2023-05-24 | End: 2023-10-04

## 2023-10-04 RX ORDER — ACETAMINOPHEN 500 MG/1
500 TABLET ORAL EVERY 6 HOURS
Refills: 0 | Status: DISCONTINUED | COMMUNITY
End: 2023-10-04

## 2023-10-04 RX ORDER — SENNOSIDES 8.6 MG TABLETS 8.6 MG/1
TABLET ORAL AT BEDTIME
Refills: 0 | Status: ACTIVE | COMMUNITY

## 2023-10-04 RX ORDER — METHYLPREDNISOLONE 4 MG/1
4 TABLET ORAL
Qty: 100 | Refills: 0 | Status: DISCONTINUED | COMMUNITY
Start: 2023-06-02 | End: 2023-10-04

## 2023-10-04 RX ORDER — OMEGA-3/DHA/EPA/FISH OIL 300-1000MG
400 CAPSULE ORAL 3 TIMES DAILY
Refills: 0 | Status: DISCONTINUED | COMMUNITY
End: 2023-10-04

## 2023-10-04 RX ORDER — TAMSULOSIN HYDROCHLORIDE 0.4 MG/1
0.4 CAPSULE ORAL AT BEDTIME
Refills: 0 | Status: DISCONTINUED | COMMUNITY
End: 2023-10-04

## 2023-10-04 RX ORDER — MULTIVIT-MIN/FOLIC/VIT K/LYCOP 400-300MCG
500 TABLET ORAL DAILY
Refills: 0 | Status: ACTIVE | COMMUNITY

## 2023-10-04 RX ORDER — PREDNISONE 50 MG/1
50 TABLET ORAL
Qty: 3 | Refills: 0 | Status: DISCONTINUED | COMMUNITY
Start: 2023-07-27 | End: 2023-10-04

## 2023-10-04 RX ORDER — CAMPHOR 0.45 %
25 GEL (GRAM) TOPICAL
Qty: 2 | Refills: 0 | Status: DISCONTINUED | COMMUNITY
Start: 2023-03-28 | End: 2023-10-04

## 2023-10-04 RX ORDER — METHYLPREDNISOLONE 16 MG/1
16 TABLET ORAL
Refills: 0 | Status: DISCONTINUED | COMMUNITY
Start: 2021-02-04 | End: 2023-10-04

## 2023-10-04 RX ORDER — FLUCONAZOLE 200 MG/1
200 TABLET ORAL DAILY
Refills: 0 | Status: DISCONTINUED | COMMUNITY
End: 2023-10-04

## 2023-10-04 RX ORDER — ALBUTEROL SULFATE 2.5 MG/3ML
(2.5 MG/3ML) SOLUTION RESPIRATORY (INHALATION) EVERY 6 HOURS
Qty: 3 | Refills: 3 | Status: DISCONTINUED | COMMUNITY
Start: 2023-05-24 | End: 2023-10-04

## 2023-10-04 RX ORDER — SULFAMETHOXAZOLE AND TRIMETHOPRIM 800; 160 MG/1; MG/1
800-160 TABLET ORAL DAILY
Qty: 90 | Refills: 3 | Status: DISCONTINUED | COMMUNITY
Start: 2023-05-24 | End: 2023-10-04

## 2023-10-04 RX ORDER — BENZONATATE 100 MG/1
100 CAPSULE ORAL 3 TIMES DAILY
Refills: 0 | Status: DISCONTINUED | COMMUNITY
End: 2023-10-04

## 2023-10-04 RX ORDER — METHYLPREDNISOLONE 8 MG/1
8 TABLET ORAL
Refills: 0 | Status: ACTIVE | COMMUNITY

## 2023-10-04 RX ORDER — WARFARIN 5 MG/1
5 TABLET ORAL DAILY
Refills: 0 | Status: DISCONTINUED | COMMUNITY
End: 2023-10-04

## 2023-10-04 RX ORDER — ALBUTEROL 90 MCG
90 AEROSOL (GRAM) INHALATION EVERY 6 HOURS
Refills: 0 | Status: ACTIVE | COMMUNITY

## 2023-10-04 RX ORDER — SIMETHICONE 80 MG/1
80 TABLET, CHEWABLE ORAL
Refills: 0 | Status: ACTIVE | COMMUNITY

## 2023-10-04 RX ORDER — DOXYCYCLINE 100 MG/1
100 CAPSULE ORAL
Qty: 20 | Refills: 0 | Status: DISCONTINUED | COMMUNITY
Start: 2023-07-24 | End: 2023-10-04

## 2023-10-04 RX ORDER — ACETYLCYSTEINE 100 MG/ML
10 SOLUTION ORAL; RESPIRATORY (INHALATION) EVERY 8 HOURS
Qty: 3 | Refills: 3 | Status: DISCONTINUED | COMMUNITY
Start: 1900-01-01 | End: 2023-10-04

## 2023-10-04 RX ORDER — INSULIN GLARGINE 100 [IU]/ML
100 INJECTION, SOLUTION SUBCUTANEOUS AT BEDTIME
Refills: 0 | Status: DISCONTINUED | COMMUNITY
End: 2023-10-04

## 2023-10-04 RX ORDER — MEXILETINE HYDROCHLORIDE 200 MG/1
200 CAPSULE ORAL
Qty: 60 | Refills: 3 | Status: DISCONTINUED | COMMUNITY
Start: 2021-05-03 | End: 2023-10-04

## 2023-10-04 RX ORDER — BUDESONIDE 0.5 MG/2ML
0.5 INHALANT ORAL TWICE DAILY
Qty: 360 | Refills: 1 | Status: DISCONTINUED | COMMUNITY
Start: 2023-05-24 | End: 2023-10-04

## 2023-10-04 RX ORDER — IPRATROPIUM BROMIDE AND ALBUTEROL SULFATE 2.5; .5 MG/3ML; MG/3ML
0.5-2.5 (3) SOLUTION RESPIRATORY (INHALATION) 4 TIMES DAILY
Refills: 0 | Status: DISCONTINUED | COMMUNITY
End: 2023-10-04

## 2023-10-04 RX ORDER — INSULIN LISPRO 100 U/ML
100 INJECTION, SOLUTION SUBCUTANEOUS
Refills: 0 | Status: DISCONTINUED | COMMUNITY
End: 2023-10-04

## 2023-10-04 RX ORDER — INSULIN ASPART 100 [IU]/ML
100 INJECTION, SOLUTION INTRAVENOUS; SUBCUTANEOUS
Qty: 3 | Refills: 3 | Status: DISCONTINUED | COMMUNITY
Start: 2023-06-20 | End: 2023-10-04

## 2023-10-04 RX ORDER — INSULIN GLARGINE 100 [IU]/ML
100 INJECTION, SOLUTION SUBCUTANEOUS
Qty: 5 | Refills: 3 | Status: DISCONTINUED | COMMUNITY
Start: 2023-05-24 | End: 2023-10-04

## 2023-10-04 RX ORDER — ONDANSETRON 4 MG/1
4 TABLET, ORALLY DISINTEGRATING ORAL EVERY 6 HOURS
Refills: 0 | Status: DISCONTINUED | COMMUNITY
End: 2023-10-04

## 2023-10-04 RX ORDER — GUAIFENESIN 100 MG/5ML
100 LIQUID ORAL EVERY 6 HOURS
Refills: 0 | Status: ACTIVE | COMMUNITY

## 2023-10-09 ENCOUNTER — NON-APPOINTMENT (OUTPATIENT)
Age: 73
End: 2023-10-09

## 2023-10-09 ENCOUNTER — LABORATORY RESULT (OUTPATIENT)
Age: 73
End: 2023-10-09

## 2023-10-10 ENCOUNTER — APPOINTMENT (OUTPATIENT)
Dept: CARDIOTHORACIC SURGERY | Facility: CLINIC | Age: 73
End: 2023-10-10

## 2023-10-10 ENCOUNTER — TRANSCRIPTION ENCOUNTER (OUTPATIENT)
Age: 73
End: 2023-10-10

## 2023-10-10 VITALS
HEART RATE: 86 BPM | WEIGHT: 145 LBS | SYSTOLIC BLOOD PRESSURE: 158 MMHG | RESPIRATION RATE: 14 BRPM | HEIGHT: 67 IN | OXYGEN SATURATION: 98 % | BODY MASS INDEX: 22.76 KG/M2 | DIASTOLIC BLOOD PRESSURE: 81 MMHG

## 2023-10-12 ENCOUNTER — APPOINTMENT (OUTPATIENT)
Dept: CARDIOLOGY | Facility: CLINIC | Age: 73
End: 2023-10-12
Payer: MEDICARE

## 2023-10-12 ENCOUNTER — LABORATORY RESULT (OUTPATIENT)
Age: 73
End: 2023-10-12

## 2023-10-12 ENCOUNTER — NON-APPOINTMENT (OUTPATIENT)
Age: 73
End: 2023-10-12

## 2023-10-12 VITALS
OXYGEN SATURATION: 95 % | HEART RATE: 91 BPM | TEMPERATURE: 97 F | DIASTOLIC BLOOD PRESSURE: 77 MMHG | BODY MASS INDEX: 22.76 KG/M2 | WEIGHT: 145 LBS | SYSTOLIC BLOOD PRESSURE: 132 MMHG | HEIGHT: 67 IN

## 2023-10-12 PROCEDURE — 99214 OFFICE O/P EST MOD 30 MIN: CPT

## 2023-10-12 PROCEDURE — 93000 ELECTROCARDIOGRAM COMPLETE: CPT

## 2023-10-13 ENCOUNTER — NON-APPOINTMENT (OUTPATIENT)
Age: 73
End: 2023-10-13

## 2023-10-13 NOTE — PATIENT PROFILE ADULT - FUNCTIONAL SCREEN CURRENT LEVEL: COMMUNICATION, MLM
Telemedicine Visit: Established Patient     This encounter was conducted via Zoom.   Verbal consent was obtained. Patient's identity was verified.    Subjective:     Magui Mallory is a 36 y.o. female presenting for evaluation and management of anxiety. She uses Klonopin for her anxiety and panic disorder. She has had panic disorder her entire life. She has developed good coping skills over the years and uses the Klonopin for the palpitations, SOB, and diaphoresis that come with the panic attacks. She has been on the same dose of Klonopin for a long period of time.     Pt has been doing therapy with [facility] due to not getting a responses from local psychiatry that she was referred to.    She has no other complaints today.     ROS   Denies SOB, chest pain, light-headedness, fever, or headaches.     Allergies   Allergen Reactions    Codeine Hives and Itching       Current medicines (including changes today)  Current Outpatient Medications   Medication Sig Dispense Refill    clonazePAM (KLONOPIN) 0.5 MG Tab TAKE 1 TABLET BY MOUTH 1 TIME A DAY AS NEEDED (PANIC ATTACKS) FOR UP TO 30 DAYS.      valACYclovir (VALTREX) 500 MG Tab TAKE 1 TABLET BY MOUTH TWICE A DAY 60 Tablet 1    SUMAtriptan (IMITREX) 25 MG Tab tablet TAKE 1-4 TABLETS BY MOUTH ONE TIME AS NEEDED FOR MIGRAINE. 9 Tablet 2    ibuprofen (MOTRIN) 600 MG Tab Take 1 Tablet by mouth every 6 hours as needed for Moderate Pain. 30 Tablet 3    MELATONIN PO Take 1 Capsule by mouth every day.      Multiple Vitamin (MULTIVITAMIN ADULT PO) Take 1 Tablet by mouth every day.      celecoxib (CELEBREX) 100 MG Cap       morphine ER (MS CONTIN) 15 MG Tab CR tablet       methocarbamol (ROBAXIN) 750 MG Tab       docusate sodium (COLACE) 100 MG Cap Take 1 Capsule by mouth 2 times a day. (Patient not taking: Reported on 4/25/2023) 60 Capsule 3    Esomeprazole Magnesium 10 MG Pack Take  by mouth. (Patient not taking: Reported on 4/25/2023)       No current  facility-administered medications for this visit.       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  She  has a past surgical history that includes meniscus repair (2015) and pr lap,diagnostic abdomen (3/3/2023).       Objective:   Vitals not obtained    Physical Exam:  Constitutional: Alert, no distress, well-groomed.  Skin: No rashes in visible areas.  Eye: Round. Conjunctiva clear, lids normal. No icterus.   ENMT: Lips pink without lesions, good dentition, moist mucous membranes. Phonation normal.  Neck:  Moves freely without pain.  Respiratory: Unlabored respiratory effort, no cough or audible wheeze.  Psych: normal affect.      Assessment and Plan:     Problem List Items Addressed This Visit       Anxiety     Will refill clonazepam for 30 days with 2 refills following patient signing controlled substance agreement.  She plans to come into the clinic to sign it today.  -Referred to psychiatry to better manage panic disorder and develop better coping skills to not have to chronically take clonazepam.         Relevant Orders    Controlled Substance Treatment Agreement    Referral to Psychiatry    Panic disorder     Patient reports random panic attacks that she has  had since she was younger.  She states she has good coping skills but uses clonazepam to help with the symptoms.  She does not take it daily and the most she takes is twice in 1 day if she has really bad symptoms.  Will refill clonazepam for 30 days with 2 refills following patient signing controlled substance agreement.  She plans to come into the clinic to sign it today.  -Referred to psychiatry to better manage panic disorder and develop better coping skills to not have to chronically take clonazepam.         Relevant Orders    Controlled Substance Treatment Agreement    Referral to Psychiatry        Follow-up: Return in about 3 months (around 1/13/2024).           0 = understands/communicates without difficulty

## 2023-10-16 ENCOUNTER — OUTPATIENT (OUTPATIENT)
Dept: OUTPATIENT SERVICES | Facility: HOSPITAL | Age: 73
LOS: 1 days | End: 2023-10-16
Payer: MEDICARE

## 2023-10-16 ENCOUNTER — APPOINTMENT (OUTPATIENT)
Age: 73
End: 2023-10-16
Payer: MEDICARE

## 2023-10-16 ENCOUNTER — LABORATORY RESULT (OUTPATIENT)
Age: 73
End: 2023-10-16

## 2023-10-16 VITALS
SYSTOLIC BLOOD PRESSURE: 148 MMHG | HEART RATE: 93 BPM | DIASTOLIC BLOOD PRESSURE: 86 MMHG | BODY MASS INDEX: 22.91 KG/M2 | WEIGHT: 146 LBS | HEIGHT: 67 IN | OXYGEN SATURATION: 96 %

## 2023-10-16 DIAGNOSIS — Z95.2 PRESENCE OF PROSTHETIC HEART VALVE: Chronic | ICD-10-CM

## 2023-10-16 DIAGNOSIS — I10 ESSENTIAL (PRIMARY) HYPERTENSION: ICD-10-CM

## 2023-10-16 DIAGNOSIS — K62.5 HEMORRHAGE OF ANUS AND RECTUM: Chronic | ICD-10-CM

## 2023-10-16 DIAGNOSIS — E78.5 HYPERLIPIDEMIA, UNSPECIFIED: ICD-10-CM

## 2023-10-16 DIAGNOSIS — I48.91 UNSPECIFIED ATRIAL FIBRILLATION: ICD-10-CM

## 2023-10-16 DIAGNOSIS — J44.9 CHRONIC OBSTRUCTIVE PULMONARY DISEASE, UNSPECIFIED: ICD-10-CM

## 2023-10-16 DIAGNOSIS — E11.9 TYPE 2 DIABETES MELLITUS WITHOUT COMPLICATIONS: ICD-10-CM

## 2023-10-16 DIAGNOSIS — H05.352 EXOSTOSIS OF LEFT ORBIT: Chronic | ICD-10-CM

## 2023-10-16 DIAGNOSIS — Z98.89 OTHER SPECIFIED POSTPROCEDURAL STATES: Chronic | ICD-10-CM

## 2023-10-16 DIAGNOSIS — Z79.01 LONG TERM (CURRENT) USE OF ANTICOAGULANTS: ICD-10-CM

## 2023-10-16 DIAGNOSIS — Z51.81 ENCOUNTER FOR THERAPEUTIC DRUG LEVEL MONITORING: ICD-10-CM

## 2023-10-16 DIAGNOSIS — Z96.653 PRESENCE OF ARTIFICIAL KNEE JOINT, BILATERAL: Chronic | ICD-10-CM

## 2023-10-16 PROCEDURE — 99214 OFFICE O/P EST MOD 30 MIN: CPT | Mod: 25

## 2023-10-16 PROCEDURE — G0463: CPT | Mod: 25

## 2023-10-16 PROCEDURE — 85610 PROTHROMBIN TIME: CPT

## 2023-10-17 LAB
INR PPP: 1.3 RATIO
POCT-PROTHROMBIN TIME: 15 SECS

## 2023-10-17 RX ORDER — PANTOPRAZOLE 40 MG/1
40 TABLET, DELAYED RELEASE ORAL DAILY
Qty: 90 | Refills: 3 | Status: ACTIVE | COMMUNITY
Start: 2023-05-24 | End: 1900-01-01

## 2023-10-17 RX ORDER — CLOPIDOGREL BISULFATE 75 MG/1
75 TABLET, FILM COATED ORAL DAILY
Qty: 90 | Refills: 1 | Status: ACTIVE | COMMUNITY
Start: 2023-09-11 | End: 1900-01-01

## 2023-10-18 NOTE — H&P ADULT - PROBLEM/PLAN-7
Pt presents to ER c/o medication error. Pt reports her Wellbutrin 300xl fell into food she was eating and she chewed it up and swallowed it instead of swallowing to pill whole. Pt reports she took it at the appropriate time but is concerned she may experience side affects from chewing the medication. Pt reports she feels anxious, denies any other symptoms/SI/HI DISPLAY PLAN FREE TEXT

## 2023-10-19 ENCOUNTER — LABORATORY RESULT (OUTPATIENT)
Age: 73
End: 2023-10-19

## 2023-10-20 ENCOUNTER — NON-APPOINTMENT (OUTPATIENT)
Age: 73
End: 2023-10-20

## 2023-10-23 ENCOUNTER — LABORATORY RESULT (OUTPATIENT)
Age: 73
End: 2023-10-23

## 2023-10-24 ENCOUNTER — NON-APPOINTMENT (OUTPATIENT)
Age: 73
End: 2023-10-24

## 2023-10-26 ENCOUNTER — APPOINTMENT (OUTPATIENT)
Dept: ENDOCRINOLOGY | Facility: CLINIC | Age: 73
End: 2023-10-26
Payer: MEDICARE

## 2023-10-26 ENCOUNTER — LABORATORY RESULT (OUTPATIENT)
Age: 73
End: 2023-10-26

## 2023-10-26 VITALS
TEMPERATURE: 98.4 F | OXYGEN SATURATION: 97 % | HEIGHT: 67 IN | BODY MASS INDEX: 22.44 KG/M2 | SYSTOLIC BLOOD PRESSURE: 132 MMHG | DIASTOLIC BLOOD PRESSURE: 76 MMHG | HEART RATE: 97 BPM | WEIGHT: 143 LBS

## 2023-10-26 LAB
GLUCOSE BLDC GLUCOMTR-MCNC: 295
HBA1C MFR BLD HPLC: 8.3

## 2023-10-26 PROCEDURE — 99214 OFFICE O/P EST MOD 30 MIN: CPT

## 2023-10-26 PROCEDURE — 82962 GLUCOSE BLOOD TEST: CPT | Mod: PD

## 2023-10-26 PROCEDURE — 83036 HEMOGLOBIN GLYCOSYLATED A1C: CPT | Mod: PD,QW

## 2023-10-27 ENCOUNTER — NON-APPOINTMENT (OUTPATIENT)
Age: 73
End: 2023-10-27

## 2023-10-27 ENCOUNTER — INPATIENT (INPATIENT)
Facility: HOSPITAL | Age: 73
LOS: 8 days | Discharge: HOME HEALTH | DRG: 70 | End: 2023-11-05
Attending: HOSPITALIST | Admitting: INTERNAL MEDICINE
Payer: MEDICARE

## 2023-10-27 VITALS
RESPIRATION RATE: 20 BRPM | HEIGHT: 67 IN | TEMPERATURE: 99 F | SYSTOLIC BLOOD PRESSURE: 155 MMHG | DIASTOLIC BLOOD PRESSURE: 84 MMHG | HEART RATE: 86 BPM | WEIGHT: 134.92 LBS | OXYGEN SATURATION: 98 %

## 2023-10-27 DIAGNOSIS — Z98.89 OTHER SPECIFIED POSTPROCEDURAL STATES: Chronic | ICD-10-CM

## 2023-10-27 DIAGNOSIS — K62.5 HEMORRHAGE OF ANUS AND RECTUM: Chronic | ICD-10-CM

## 2023-10-27 DIAGNOSIS — R41.82 ALTERED MENTAL STATUS, UNSPECIFIED: ICD-10-CM

## 2023-10-27 DIAGNOSIS — Z96.653 PRESENCE OF ARTIFICIAL KNEE JOINT, BILATERAL: Chronic | ICD-10-CM

## 2023-10-27 DIAGNOSIS — Z95.2 PRESENCE OF PROSTHETIC HEART VALVE: Chronic | ICD-10-CM

## 2023-10-27 DIAGNOSIS — Z98.890 OTHER SPECIFIED POSTPROCEDURAL STATES: Chronic | ICD-10-CM

## 2023-10-27 DIAGNOSIS — H05.352 EXOSTOSIS OF LEFT ORBIT: Chronic | ICD-10-CM

## 2023-10-27 DIAGNOSIS — Z87.09 PERSONAL HISTORY OF OTHER DISEASES OF THE RESPIRATORY SYSTEM: Chronic | ICD-10-CM

## 2023-10-27 LAB
ALBUMIN SERPL ELPH-MCNC: 3.8 G/DL — SIGNIFICANT CHANGE UP (ref 3.3–5)
ALBUMIN SERPL ELPH-MCNC: 3.8 G/DL — SIGNIFICANT CHANGE UP (ref 3.3–5)
ALP SERPL-CCNC: 94 U/L — SIGNIFICANT CHANGE UP (ref 40–120)
ALP SERPL-CCNC: 94 U/L — SIGNIFICANT CHANGE UP (ref 40–120)
ALT FLD-CCNC: 23 U/L — SIGNIFICANT CHANGE UP (ref 10–45)
ALT FLD-CCNC: 23 U/L — SIGNIFICANT CHANGE UP (ref 10–45)
ANION GAP SERPL CALC-SCNC: 13 MMOL/L — SIGNIFICANT CHANGE UP (ref 5–17)
ANION GAP SERPL CALC-SCNC: 13 MMOL/L — SIGNIFICANT CHANGE UP (ref 5–17)
ANISOCYTOSIS BLD QL: SLIGHT — SIGNIFICANT CHANGE UP
ANISOCYTOSIS BLD QL: SLIGHT — SIGNIFICANT CHANGE UP
APPEARANCE UR: CLEAR — SIGNIFICANT CHANGE UP
APPEARANCE UR: CLEAR — SIGNIFICANT CHANGE UP
APTT BLD: 40.7 SEC — HIGH (ref 24.5–35.6)
APTT BLD: 40.7 SEC — HIGH (ref 24.5–35.6)
AST SERPL-CCNC: 48 U/L — HIGH (ref 10–40)
AST SERPL-CCNC: 48 U/L — HIGH (ref 10–40)
BASE EXCESS BLDV CALC-SCNC: 1.6 MMOL/L — SIGNIFICANT CHANGE UP (ref -2–3)
BASE EXCESS BLDV CALC-SCNC: 1.6 MMOL/L — SIGNIFICANT CHANGE UP (ref -2–3)
BASOPHILS # BLD AUTO: 0 K/UL — SIGNIFICANT CHANGE UP (ref 0–0.2)
BASOPHILS # BLD AUTO: 0 K/UL — SIGNIFICANT CHANGE UP (ref 0–0.2)
BASOPHILS NFR BLD AUTO: 0 % — SIGNIFICANT CHANGE UP (ref 0–2)
BASOPHILS NFR BLD AUTO: 0 % — SIGNIFICANT CHANGE UP (ref 0–2)
BILIRUB SERPL-MCNC: 0.3 MG/DL — SIGNIFICANT CHANGE UP (ref 0.2–1.2)
BILIRUB SERPL-MCNC: 0.3 MG/DL — SIGNIFICANT CHANGE UP (ref 0.2–1.2)
BILIRUB UR-MCNC: NEGATIVE — SIGNIFICANT CHANGE UP
BILIRUB UR-MCNC: NEGATIVE — SIGNIFICANT CHANGE UP
BUN SERPL-MCNC: 14 MG/DL — SIGNIFICANT CHANGE UP (ref 7–23)
BUN SERPL-MCNC: 14 MG/DL — SIGNIFICANT CHANGE UP (ref 7–23)
BURR CELLS BLD QL SMEAR: PRESENT — SIGNIFICANT CHANGE UP
BURR CELLS BLD QL SMEAR: PRESENT — SIGNIFICANT CHANGE UP
CA-I SERPL-SCNC: 1.16 MMOL/L — SIGNIFICANT CHANGE UP (ref 1.15–1.33)
CA-I SERPL-SCNC: 1.16 MMOL/L — SIGNIFICANT CHANGE UP (ref 1.15–1.33)
CALCIUM SERPL-MCNC: 9.3 MG/DL — SIGNIFICANT CHANGE UP (ref 8.4–10.5)
CALCIUM SERPL-MCNC: 9.3 MG/DL — SIGNIFICANT CHANGE UP (ref 8.4–10.5)
CHLORIDE BLDV-SCNC: 101 MMOL/L — SIGNIFICANT CHANGE UP (ref 96–108)
CHLORIDE BLDV-SCNC: 101 MMOL/L — SIGNIFICANT CHANGE UP (ref 96–108)
CHLORIDE SERPL-SCNC: 99 MMOL/L — SIGNIFICANT CHANGE UP (ref 96–108)
CHLORIDE SERPL-SCNC: 99 MMOL/L — SIGNIFICANT CHANGE UP (ref 96–108)
CO2 BLDV-SCNC: 29 MMOL/L — HIGH (ref 22–26)
CO2 BLDV-SCNC: 29 MMOL/L — HIGH (ref 22–26)
CO2 SERPL-SCNC: 22 MMOL/L — SIGNIFICANT CHANGE UP (ref 22–31)
CO2 SERPL-SCNC: 22 MMOL/L — SIGNIFICANT CHANGE UP (ref 22–31)
COLOR SPEC: SIGNIFICANT CHANGE UP
COLOR SPEC: SIGNIFICANT CHANGE UP
CREAT SERPL-MCNC: 0.58 MG/DL — SIGNIFICANT CHANGE UP (ref 0.5–1.3)
CREAT SERPL-MCNC: 0.58 MG/DL — SIGNIFICANT CHANGE UP (ref 0.5–1.3)
DACRYOCYTES BLD QL SMEAR: SLIGHT — SIGNIFICANT CHANGE UP
DACRYOCYTES BLD QL SMEAR: SLIGHT — SIGNIFICANT CHANGE UP
DIFF PNL FLD: NEGATIVE — SIGNIFICANT CHANGE UP
DIFF PNL FLD: NEGATIVE — SIGNIFICANT CHANGE UP
EGFR: 94 ML/MIN/1.73M2 — SIGNIFICANT CHANGE UP
EGFR: 94 ML/MIN/1.73M2 — SIGNIFICANT CHANGE UP
ELLIPTOCYTES BLD QL SMEAR: SLIGHT — SIGNIFICANT CHANGE UP
ELLIPTOCYTES BLD QL SMEAR: SLIGHT — SIGNIFICANT CHANGE UP
EOSINOPHIL # BLD AUTO: 0 K/UL — SIGNIFICANT CHANGE UP (ref 0–0.5)
EOSINOPHIL # BLD AUTO: 0 K/UL — SIGNIFICANT CHANGE UP (ref 0–0.5)
EOSINOPHIL NFR BLD AUTO: 0 % — SIGNIFICANT CHANGE UP (ref 0–6)
EOSINOPHIL NFR BLD AUTO: 0 % — SIGNIFICANT CHANGE UP (ref 0–6)
FLUAV AG NPH QL: SIGNIFICANT CHANGE UP
FLUAV AG NPH QL: SIGNIFICANT CHANGE UP
FLUBV AG NPH QL: SIGNIFICANT CHANGE UP
FLUBV AG NPH QL: SIGNIFICANT CHANGE UP
GAS PNL BLDV: 131 MMOL/L — LOW (ref 136–145)
GAS PNL BLDV: 131 MMOL/L — LOW (ref 136–145)
GAS PNL BLDV: SIGNIFICANT CHANGE UP
GLUCOSE BLDV-MCNC: 489 MG/DL — CRITICAL HIGH (ref 70–99)
GLUCOSE BLDV-MCNC: 489 MG/DL — CRITICAL HIGH (ref 70–99)
GLUCOSE SERPL-MCNC: 458 MG/DL — CRITICAL HIGH (ref 70–99)
GLUCOSE SERPL-MCNC: 458 MG/DL — CRITICAL HIGH (ref 70–99)
GLUCOSE UR QL: ABNORMAL
GLUCOSE UR QL: ABNORMAL
HCO3 BLDV-SCNC: 28 MMOL/L — SIGNIFICANT CHANGE UP (ref 22–29)
HCO3 BLDV-SCNC: 28 MMOL/L — SIGNIFICANT CHANGE UP (ref 22–29)
HCT VFR BLD CALC: 36.8 % — SIGNIFICANT CHANGE UP (ref 34.5–45)
HCT VFR BLD CALC: 36.8 % — SIGNIFICANT CHANGE UP (ref 34.5–45)
HCT VFR BLDA CALC: 34 % — LOW (ref 34.5–46.5)
HCT VFR BLDA CALC: 34 % — LOW (ref 34.5–46.5)
HGB BLD CALC-MCNC: 11.2 G/DL — LOW (ref 11.7–16.1)
HGB BLD CALC-MCNC: 11.2 G/DL — LOW (ref 11.7–16.1)
HGB BLD-MCNC: 10.5 G/DL — LOW (ref 11.5–15.5)
HGB BLD-MCNC: 10.5 G/DL — LOW (ref 11.5–15.5)
INR BLD: 3.27 RATIO — HIGH (ref 0.85–1.18)
INR BLD: 3.27 RATIO — HIGH (ref 0.85–1.18)
KETONES UR-MCNC: SIGNIFICANT CHANGE UP
KETONES UR-MCNC: SIGNIFICANT CHANGE UP
LACTATE BLDV-MCNC: 2.4 MMOL/L — HIGH (ref 0.5–2)
LACTATE BLDV-MCNC: 2.4 MMOL/L — HIGH (ref 0.5–2)
LEUKOCYTE ESTERASE UR-ACNC: NEGATIVE — SIGNIFICANT CHANGE UP
LEUKOCYTE ESTERASE UR-ACNC: NEGATIVE — SIGNIFICANT CHANGE UP
LYMPHOCYTES # BLD AUTO: 1.77 K/UL — SIGNIFICANT CHANGE UP (ref 1–3.3)
LYMPHOCYTES # BLD AUTO: 1.77 K/UL — SIGNIFICANT CHANGE UP (ref 1–3.3)
LYMPHOCYTES # BLD AUTO: 16.7 % — SIGNIFICANT CHANGE UP (ref 13–44)
LYMPHOCYTES # BLD AUTO: 16.7 % — SIGNIFICANT CHANGE UP (ref 13–44)
MACROCYTES BLD QL: SLIGHT — SIGNIFICANT CHANGE UP
MACROCYTES BLD QL: SLIGHT — SIGNIFICANT CHANGE UP
MANUAL SMEAR VERIFICATION: SIGNIFICANT CHANGE UP
MANUAL SMEAR VERIFICATION: SIGNIFICANT CHANGE UP
MCHC RBC-ENTMCNC: 20.8 PG — LOW (ref 27–34)
MCHC RBC-ENTMCNC: 20.8 PG — LOW (ref 27–34)
MCHC RBC-ENTMCNC: 28.5 GM/DL — LOW (ref 32–36)
MCHC RBC-ENTMCNC: 28.5 GM/DL — LOW (ref 32–36)
MCV RBC AUTO: 73 FL — LOW (ref 80–100)
MCV RBC AUTO: 73 FL — LOW (ref 80–100)
MICROCYTES BLD QL: SLIGHT — SIGNIFICANT CHANGE UP
MICROCYTES BLD QL: SLIGHT — SIGNIFICANT CHANGE UP
MONOCYTES # BLD AUTO: 0.49 K/UL — SIGNIFICANT CHANGE UP (ref 0–0.9)
MONOCYTES # BLD AUTO: 0.49 K/UL — SIGNIFICANT CHANGE UP (ref 0–0.9)
MONOCYTES NFR BLD AUTO: 4.6 % — SIGNIFICANT CHANGE UP (ref 2–14)
MONOCYTES NFR BLD AUTO: 4.6 % — SIGNIFICANT CHANGE UP (ref 2–14)
NEUTROPHILS # BLD AUTO: 8.33 K/UL — HIGH (ref 1.8–7.4)
NEUTROPHILS # BLD AUTO: 8.33 K/UL — HIGH (ref 1.8–7.4)
NEUTROPHILS NFR BLD AUTO: 78.7 % — HIGH (ref 43–77)
NEUTROPHILS NFR BLD AUTO: 78.7 % — HIGH (ref 43–77)
NITRITE UR-MCNC: NEGATIVE — SIGNIFICANT CHANGE UP
NITRITE UR-MCNC: NEGATIVE — SIGNIFICANT CHANGE UP
OVALOCYTES BLD QL SMEAR: SLIGHT — SIGNIFICANT CHANGE UP
OVALOCYTES BLD QL SMEAR: SLIGHT — SIGNIFICANT CHANGE UP
PCO2 BLDV: 49 MMHG — HIGH (ref 39–42)
PCO2 BLDV: 49 MMHG — HIGH (ref 39–42)
PH BLDV: 7.36 — SIGNIFICANT CHANGE UP (ref 7.32–7.43)
PH BLDV: 7.36 — SIGNIFICANT CHANGE UP (ref 7.32–7.43)
PH UR: 6 — SIGNIFICANT CHANGE UP (ref 5–8)
PH UR: 6 — SIGNIFICANT CHANGE UP (ref 5–8)
PLAT MORPH BLD: NORMAL — SIGNIFICANT CHANGE UP
PLAT MORPH BLD: NORMAL — SIGNIFICANT CHANGE UP
PLATELET # BLD AUTO: 362 K/UL — SIGNIFICANT CHANGE UP (ref 150–400)
PLATELET # BLD AUTO: 362 K/UL — SIGNIFICANT CHANGE UP (ref 150–400)
PO2 BLDV: 51 MMHG — HIGH (ref 25–45)
PO2 BLDV: 51 MMHG — HIGH (ref 25–45)
POIKILOCYTOSIS BLD QL AUTO: SLIGHT — SIGNIFICANT CHANGE UP
POIKILOCYTOSIS BLD QL AUTO: SLIGHT — SIGNIFICANT CHANGE UP
POLYCHROMASIA BLD QL SMEAR: SLIGHT — SIGNIFICANT CHANGE UP
POLYCHROMASIA BLD QL SMEAR: SLIGHT — SIGNIFICANT CHANGE UP
POTASSIUM BLDV-SCNC: 5.7 MMOL/L — HIGH (ref 3.5–5.1)
POTASSIUM BLDV-SCNC: 5.7 MMOL/L — HIGH (ref 3.5–5.1)
POTASSIUM SERPL-MCNC: 5.5 MMOL/L — HIGH (ref 3.5–5.3)
POTASSIUM SERPL-MCNC: 5.5 MMOL/L — HIGH (ref 3.5–5.3)
POTASSIUM SERPL-SCNC: 5.5 MMOL/L — HIGH (ref 3.5–5.3)
POTASSIUM SERPL-SCNC: 5.5 MMOL/L — HIGH (ref 3.5–5.3)
PROT SERPL-MCNC: 6.9 G/DL — SIGNIFICANT CHANGE UP (ref 6–8.3)
PROT SERPL-MCNC: 6.9 G/DL — SIGNIFICANT CHANGE UP (ref 6–8.3)
PROT UR-MCNC: NEGATIVE — SIGNIFICANT CHANGE UP
PROT UR-MCNC: NEGATIVE — SIGNIFICANT CHANGE UP
PROTHROM AB SERPL-ACNC: 34.8 SEC — HIGH (ref 9.5–13)
PROTHROM AB SERPL-ACNC: 34.8 SEC — HIGH (ref 9.5–13)
RBC # BLD: 5.04 M/UL — SIGNIFICANT CHANGE UP (ref 3.8–5.2)
RBC # BLD: 5.04 M/UL — SIGNIFICANT CHANGE UP (ref 3.8–5.2)
RBC # FLD: 22.3 % — HIGH (ref 10.3–14.5)
RBC # FLD: 22.3 % — HIGH (ref 10.3–14.5)
RBC BLD AUTO: ABNORMAL
RBC BLD AUTO: ABNORMAL
RSV RNA NPH QL NAA+NON-PROBE: SIGNIFICANT CHANGE UP
RSV RNA NPH QL NAA+NON-PROBE: SIGNIFICANT CHANGE UP
SAO2 % BLDV: 79.5 % — SIGNIFICANT CHANGE UP (ref 67–88)
SAO2 % BLDV: 79.5 % — SIGNIFICANT CHANGE UP (ref 67–88)
SARS-COV-2 RNA SPEC QL NAA+PROBE: DETECTED
SARS-COV-2 RNA SPEC QL NAA+PROBE: DETECTED
SCHISTOCYTES BLD QL AUTO: SLIGHT — SIGNIFICANT CHANGE UP
SCHISTOCYTES BLD QL AUTO: SLIGHT — SIGNIFICANT CHANGE UP
SODIUM SERPL-SCNC: 134 MMOL/L — LOW (ref 135–145)
SODIUM SERPL-SCNC: 134 MMOL/L — LOW (ref 135–145)
SP GR SPEC: 1.04 — HIGH (ref 1.01–1.02)
SP GR SPEC: 1.04 — HIGH (ref 1.01–1.02)
TARGETS BLD QL SMEAR: SLIGHT — SIGNIFICANT CHANGE UP
TARGETS BLD QL SMEAR: SLIGHT — SIGNIFICANT CHANGE UP
TROPONIN T, HIGH SENSITIVITY RESULT: 25 NG/L — SIGNIFICANT CHANGE UP (ref 0–51)
TROPONIN T, HIGH SENSITIVITY RESULT: 25 NG/L — SIGNIFICANT CHANGE UP (ref 0–51)
UROBILINOGEN FLD QL: NEGATIVE — SIGNIFICANT CHANGE UP
UROBILINOGEN FLD QL: NEGATIVE — SIGNIFICANT CHANGE UP
WBC # BLD: 10.58 K/UL — HIGH (ref 3.8–10.5)
WBC # BLD: 10.58 K/UL — HIGH (ref 3.8–10.5)
WBC # FLD AUTO: 10.58 K/UL — HIGH (ref 3.8–10.5)
WBC # FLD AUTO: 10.58 K/UL — HIGH (ref 3.8–10.5)

## 2023-10-27 PROCEDURE — 71045 X-RAY EXAM CHEST 1 VIEW: CPT | Mod: 26

## 2023-10-27 PROCEDURE — 99223 1ST HOSP IP/OBS HIGH 75: CPT

## 2023-10-27 PROCEDURE — 99285 EMERGENCY DEPT VISIT HI MDM: CPT | Mod: GC

## 2023-10-27 PROCEDURE — 70450 CT HEAD/BRAIN W/O DYE: CPT | Mod: 26,MA

## 2023-10-27 RX ORDER — DEXTROSE 50 % IN WATER 50 %
12.5 SYRINGE (ML) INTRAVENOUS ONCE
Refills: 0 | Status: DISCONTINUED | OUTPATIENT
Start: 2023-10-27 | End: 2023-11-05

## 2023-10-27 RX ORDER — INSULIN LISPRO 100/ML
VIAL (ML) SUBCUTANEOUS AT BEDTIME
Refills: 0 | Status: DISCONTINUED | OUTPATIENT
Start: 2023-10-27 | End: 2023-11-05

## 2023-10-27 RX ORDER — DEXTROSE 50 % IN WATER 50 %
25 SYRINGE (ML) INTRAVENOUS ONCE
Refills: 0 | Status: DISCONTINUED | OUTPATIENT
Start: 2023-10-27 | End: 2023-11-05

## 2023-10-27 RX ORDER — LANOLIN ALCOHOL/MO/W.PET/CERES
3 CREAM (GRAM) TOPICAL AT BEDTIME
Refills: 0 | Status: DISCONTINUED | OUTPATIENT
Start: 2023-10-27 | End: 2023-11-05

## 2023-10-27 RX ORDER — GLUCAGON INJECTION, SOLUTION 0.5 MG/.1ML
1 INJECTION, SOLUTION SUBCUTANEOUS ONCE
Refills: 0 | Status: DISCONTINUED | OUTPATIENT
Start: 2023-10-27 | End: 2023-11-05

## 2023-10-27 RX ORDER — INSULIN GLARGINE 100 [IU]/ML
16 INJECTION, SOLUTION SUBCUTANEOUS AT BEDTIME
Refills: 0 | Status: DISCONTINUED | OUTPATIENT
Start: 2023-10-27 | End: 2023-10-29

## 2023-10-27 RX ORDER — SODIUM CHLORIDE 9 MG/ML
1000 INJECTION INTRAMUSCULAR; INTRAVENOUS; SUBCUTANEOUS ONCE
Refills: 0 | Status: COMPLETED | OUTPATIENT
Start: 2023-10-27 | End: 2023-10-27

## 2023-10-27 RX ORDER — INSULIN LISPRO 100/ML
12 VIAL (ML) SUBCUTANEOUS
Refills: 0 | Status: DISCONTINUED | OUTPATIENT
Start: 2023-10-27 | End: 2023-10-29

## 2023-10-27 RX ORDER — DEXTROSE 50 % IN WATER 50 %
15 SYRINGE (ML) INTRAVENOUS ONCE
Refills: 0 | Status: DISCONTINUED | OUTPATIENT
Start: 2023-10-27 | End: 2023-11-05

## 2023-10-27 RX ORDER — SODIUM CHLORIDE 9 MG/ML
1000 INJECTION, SOLUTION INTRAVENOUS
Refills: 0 | Status: DISCONTINUED | OUTPATIENT
Start: 2023-10-27 | End: 2023-11-05

## 2023-10-27 RX ORDER — ONDANSETRON 8 MG/1
4 TABLET, FILM COATED ORAL EVERY 8 HOURS
Refills: 0 | Status: DISCONTINUED | OUTPATIENT
Start: 2023-10-27 | End: 2023-11-05

## 2023-10-27 RX ORDER — INSULIN LISPRO 100/ML
VIAL (ML) SUBCUTANEOUS
Refills: 0 | Status: DISCONTINUED | OUTPATIENT
Start: 2023-10-27 | End: 2023-11-05

## 2023-10-27 RX ORDER — ACETAMINOPHEN 500 MG
650 TABLET ORAL EVERY 6 HOURS
Refills: 0 | Status: DISCONTINUED | OUTPATIENT
Start: 2023-10-27 | End: 2023-11-05

## 2023-10-27 RX ADMIN — SODIUM CHLORIDE 1000 MILLILITER(S): 9 INJECTION INTRAMUSCULAR; INTRAVENOUS; SUBCUTANEOUS at 21:23

## 2023-10-27 NOTE — H&P ADULT - NSHPREVIEWOFSYSTEMS_GEN_ALL_CORE
CONSTITUTIONAL: + weakness, no fevers or chills  EYES/ENT: No visual changes;  No dysphagia  NECK: No pain or stiffness  RESPIRATORY: +  cough,  + wheezing, no  hemoptysis; No shortness of breath  CARDIOVASCULAR: No chest pain or palpitations; No lower extremity edema  EXTREMITIES: no le edema, cyanosis, clubbing  GASTROINTESTINAL: No abdominal or epigastric pain. No nausea, vomiting, or hematemesis; No diarrhea or constipation. No melena or hematochezia.  BACK: No back pain  GENITOURINARY: No dysuria, frequency or hematuria  NEUROLOGICAL: No numbness or weakness  MSK: no joint swelling or pain  SKIN: No itching, burning, rashes, or lesions   PSYCH: no agitation  All other review of systems is negative unless indicated above.

## 2023-10-27 NOTE — H&P ADULT - PROBLEM SELECTOR PLAN 6
- Continue with Plavix 75 mg PO daily   -  Continue with Mexiletine 200 mg every 8 hours   -  Continue with Atorvastatin 20 mg PO HS

## 2023-10-27 NOTE — H&P ADULT - PROBLEM SELECTOR PLAN 7
previously positive 9/29 , treated with remdesivir at that time , low suspicion for true infection , however patient is immunocompromised will therefore maintain on contact isolation until further evaluated by ID , not hypoxic , no indication for further treatment at this time   - isolation precautions   - ID consult to be arranged by day team

## 2023-10-27 NOTE — H&P ADULT - PROBLEM SELECTOR PLAN 3
ongoing cough , not improved with recent course of doxycycline , will hold off on further ABX until evaluated by pulmonary given extensive history of allergies   - sputum cultures   - Acetylcysteine via nebulizer q8h    - Scopalamine patch   - Pulmonary follow up to be arranged by day team

## 2023-10-27 NOTE — H&P ADULT - PROBLEM SELECTOR PLAN 1
unsteady gait w/ episodic confusion , now at baseline mental state , no acute findings on CT Head ,  no focal deficits on exam , mild leukocytosis but no other evidence for acute infection , hyperglycemic  but no other major metobolic derangements , CO2 within acceptable range , and no hypoxic   - Neuro-checks   - MR brain   - Neurology consult - to be arranged by day team   - fall precautions   - PT

## 2023-10-27 NOTE — ED ADULT TRIAGE NOTE - CHIEF COMPLAINT QUOTE
AMS since yesterday at 1200 in the afternoon sent in by PMD to r/o sepsis denies fevers, chills  dx with COVID, MRSA and shingles x6 weeks ago   hx of DM

## 2023-10-27 NOTE — H&P ADULT - PROBLEM SELECTOR PLAN 2
controlled   - Symbicort  ( (therapeutic equivalent for Breo)   - Duonebs PRN   - Tiotropium   - montelukast   - continue home dose Solumedrol

## 2023-10-27 NOTE — H&P ADULT - PROBLEM SELECTOR PLAN 4
Reduced home dose while inpatient , can uptitrate as tolerates  - Lantus  hs   - Lispro w/ meals   - insulin correction scale  - check fsg qac/qhs  - check a1c in am  - consistent carb diet

## 2023-10-27 NOTE — ED PROVIDER NOTE - ATTENDING CONTRIBUTION TO CARE
memory issue / gait trouble  awake / alert / talking / fluid / non-focal  ct head / search for alt cause such as metabolic encephalopathy / may admit for possible delirium  labs / ekg / re-assess

## 2023-10-27 NOTE — ED PROVIDER NOTE - CLINICAL SUMMARY MEDICAL DECISION MAKING FREE TEXT BOX
72-year-old female with past medical history of asthma on chronic steroids, bronchiectasis, allergic rhinitis, recurrent PNA, tracheomalacia, DM, paroxysmal A-fib on Coumadin, colorectal cancer s/p colostomy, recent hospitalization for acute respiratory failure with hypoxia secondary to asthma/COPD exasperation presents today for increased confusion and unsteadiness on her feet for the past 2 weeks.  Patient denies any numbness or tingling/focal weakness.  Denies any fever, chills, nausea, vomiting, chest pain, SOB, abdominal pain, diarrhea, urinary symptoms, cough, nasal congestion.  Notes becoming more confused stating "inability to remember things" and becoming disoriented.  No recent travel or sick contacts.    Given history of increased confusion and unsteady gait without source of cause will order CBC CMP, blood cultures, UA with culture, EKG, troponin, CXR, CT head to rule out encephalitis  versus infectious etiology versus UTI versus PNA but less likely given afebrile without infectious symptoms and alert and oriented bedside.  Will reassess patient

## 2023-10-27 NOTE — H&P ADULT - NSICDXPASTSURGICALHX_GEN_ALL_CORE_FT
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)- course complicated by obstruction, respiratory failure, multiple CPR attempts -  stent discontinued; 10/20/2016 Tracheobronchoplasty (Prolene Mesh) performed at [facility] by Dr Zapien    Rectal bleeding exam under anesthesia ([facility]) 2/2018    S/P aortic valve replacement     S/P bronchoscopy 6/5/2018 - [place] (Dr Zapien) no evidence of tracheobronchomalacia in trachea or bronchial tubes    S/P TAVR (transcatheter aortic valve replacement)

## 2023-10-27 NOTE — H&P ADULT - NSHPLABSRESULTS_GEN_ALL_CORE
Labs personally reviewed:                          10.5   10.58 )-----------( 362      ( 27 Oct 2023 19:28 )             36.8     10-27    134<L>  |  99  |  14  ----------------------------<  458<HH>  5.5<H>   |  22  |  0.58    Ca    9.3      27 Oct 2023 19:28    TPro  6.9  /  Alb  3.8  /  TBili  0.3  /  DBili  x   /  AST  48<H>  /  ALT  23  /  AlkPhos  94  10-27        LIVER FUNCTIONS - ( 27 Oct 2023 19:28 )  Alb: 3.8 g/dL / Pro: 6.9 g/dL / ALK PHOS: 94 U/L / ALT: 23 U/L / AST: 48 U/L / GGT: x           PT/INR - ( 27 Oct 2023 19:28 )   PT: 34.8 sec;   INR: 3.27 ratio         PTT - ( 27 Oct 2023 19:28 )  PTT:40.7 sec  Urinalysis Basic - ( 27 Oct 2023 20:44 )    Color: Light Yellow / Appearance: Clear / S.036 / pH: x  Gluc: x / Ketone: Trace  / Bili: Negative / Urobili: Negative   Blood: x / Protein: Negative / Nitrite: Negative   Leuk Esterase: Negative / RBC: x / WBC x   Sq Epi: x / Non Sq Epi: x / Bacteria: x      CAPILLARY BLOOD GLUCOSE      POCT Blood Glucose.: 387 mg/dL (27 Oct 2023 18:35)  POCT Blood Glucose.: 402 mg/dL (27 Oct 2023 18:34)      Imaging:  CXR personally reviewed: Right basilar patchy opacities, likely atelectasis.  CT head: No acute intracranial hemorrhage, brain edema, or mass effect.    EKG personally reviewed: Labs personally reviewed:                          10.5   10.58 )-----------( 362      ( 27 Oct 2023 19:28 )             36.8     10-27    134<L>  |  99  |  14  ----------------------------<  458<HH>  5.5<H>   |  22  |  0.58    Ca    9.3      27 Oct 2023 19:28    TPro  6.9  /  Alb  3.8  /  TBili  0.3  /  DBili  x   /  AST  48<H>  /  ALT  23  /  AlkPhos  94  10-27        LIVER FUNCTIONS - ( 27 Oct 2023 19:28 )  Alb: 3.8 g/dL / Pro: 6.9 g/dL / ALK PHOS: 94 U/L / ALT: 23 U/L / AST: 48 U/L / GGT: x           PT/INR - ( 27 Oct 2023 19:28 )   PT: 34.8 sec;   INR: 3.27 ratio         PTT - ( 27 Oct 2023 19:28 )  PTT:40.7 sec  Urinalysis Basic - ( 27 Oct 2023 20:44 )    Color: Light Yellow / Appearance: Clear / S.036 / pH: x  Gluc: x / Ketone: Trace  / Bili: Negative / Urobili: Negative   Blood: x / Protein: Negative / Nitrite: Negative   Leuk Esterase: Negative / RBC: x / WBC x   Sq Epi: x / Non Sq Epi: x / Bacteria: x      CAPILLARY BLOOD GLUCOSE      POCT Blood Glucose.: 387 mg/dL (27 Oct 2023 18:35)  POCT Blood Glucose.: 402 mg/dL (27 Oct 2023 18:34)      Imaging:  CXR personally reviewed: Right basilar patchy opacities, likely atelectasis.  CT head: No acute intracranial hemorrhage, brain edema, or mass effect.    EKG personally reviewed: nsr at 80 bpm , bifascicular block

## 2023-10-27 NOTE — H&P ADULT - HISTORY OF PRESENT ILLNESS
Patient is a 72 female with PMH of asthma on chronic steroids, bronchiectasis, allergic rhinitis,  recurrent PNA,  tracheomalacia s/p tracheoplasty, ESBL proteus infections (sputum),  diabetes, paroxysmal A-fib on coumadin, colorectal cancer many years ago status post colostomy, recent hospitalization at an outside hospital  for acute respiratory failure with hypoxia secondary to asthma/COPD exacerbation and bronchopneumonia 6/5/2023 - 6/16/2023), and AVR 2022 ( Dr. Cabrera)   s/p  TAVR- [facility] on 9/6 by Dr. Gonsalves and Dr. Leahy) , recently admitted (9/12- 10/01/23) for SOB, treated for bronchectasis flare, during course treated fo shingles , as well as covid infection; patient now presents for altered mental state for the past two days.  Patient is a 72 female with PMH of asthma on chronic steroids, bronchiectasis, allergic rhinitis,  recurrent PNA,  tracheomalacia s/p tracheoplasty, ESBL proteus infections (sputum),  diabetes, paroxysmal A-fib on coumadin, colorectal cancer many years ago status post colostomy, recent hospitalization at an outside hospital  for acute respiratory failure with hypoxia secondary to asthma/COPD exacerbation and bronchopneumonia 6/5/2023 - 6/16/2023), and AVR 2022 ( Dr. Cabrera)   s/p  TAVR- [facility] on 9/6 by Dr. Gonsalves and Dr. Leahy) , recently admitted (9/12- 10/01/23) for SOB, treated for bronchectasis flare, during course treated fo shingles , as well as covid infection; patient now presents for altered mental state for the past two days.   For the past two weeks, patient reports feeling unsteady on her feet and difficulty ambulating , needing assitance to walk. She reports no numbness or tingling and focal weakness. During this time she reports an ongoing cough with thick yellow-white sputum . She contacted her pulmonologist and was treated with a ten day course of doxycycline which was recently completed. On day prior to admission. patient became confused and "couldn't remember things" and became disoriented. She reports no fever or chills, cough remains unchanged. She has no chest pain and no shorntess of breath. No sick contacts.

## 2023-10-27 NOTE — ED PROVIDER NOTE - OBJECTIVE STATEMENT
72-year-old female with past medical history of asthma on chronic steroids, bronchiectasis, allergic rhinitis, recurrent PNA, tracheomalacia, DM, paroxysmal A-fib on Coumadin, colorectal cancer s/p colostomy, recent hospitalization for acute respiratory failure with hypoxia secondary to asthma/COPD exasperation presents today for increased confusion and unsteadiness on her feet for the past 2 weeks.  Patient denies any numbness or tingling/focal weakness.  Denies any fever, chills, nausea, vomiting, chest pain, SOB, abdominal pain, diarrhea, urinary symptoms, cough, nasal congestion.  Notes becoming more confused stating "inability to remember things" and becoming disoriented.  No recent travel or sick contacts.

## 2023-10-27 NOTE — ADDENDUM
[FreeTextEntry1] : Documented by Win Newberry acting as a scribe for Dr. Zohaib Zapien on 06/27/2019\par  Medical Necessity Statement: Based on my medical judgement, Mohs surgery is the most appropriate treatment for this cancer compared to other treatments.

## 2023-10-27 NOTE — H&P ADULT - ASSESSMENT
72F w/ asthma on chronic steroids, bronchiectasis,  recurrent PNA,  tracheomalacia s/p tracheoplasty, DM  paroxysmal A-fib on coumadin, colorectal cancer s/p colectomy and ostomy ,  s/p AVR (2022) and  TAVR- [facility] on 9/6 , recent shingles , as well as covid infection;  p/w AMS x few days

## 2023-10-27 NOTE — ED ADULT NURSE NOTE - NSFALLHARMRISKINTERV_ED_ALL_ED
Assistance OOB with selected safe patient handling equipment if applicable/Assistance with ambulation/Communicate risk of Fall with Harm to all staff, patient, and family/Monitor gait and stability/Provide patient with walking aids/Provide visual cue: red socks, yellow wristband, yellow gown, etc/Reinforce activity limits and safety measures with patient and family/Bed in lowest position, wheels locked, appropriate side rails in place/Call bell, personal items and telephone in reach/Instruct patient to call for assistance before getting out of bed/chair/stretcher/Non-slip footwear applied when patient is off stretcher/[place] to call system/Physically safe environment - no spills, clutter or unnecessary equipment/Purposeful Proactive Rounding/Room/bathroom lighting operational, light cord in reach

## 2023-10-27 NOTE — H&P ADULT - NSHPPHYSICALEXAM_GEN_ALL_CORE
Vital Signs Last 24 Hrs  T(C): 37 (27 Oct 2023 18:40), Max: 37.1 (27 Oct 2023 17:13)  T(F): 98.6 (27 Oct 2023 18:40), Max: 98.7 (27 Oct 2023 17:13)  HR: 84 (27 Oct 2023 18:40) (84 - 86)  BP: 167/75 (27 Oct 2023 18:40) (155/84 - 167/75)  BP(mean): --  RR: 17 (27 Oct 2023 18:40) (17 - 20)  SpO2: 97% (27 Oct 2023 18:40) (97% - 98%)    Parameters below as of 27 Oct 2023 18:40  Patient On (Oxygen Delivery Method): room air Vital Signs Last 24 Hrs  T(C): 37 (27 Oct 2023 18:40), Max: 37.1 (27 Oct 2023 17:13)  T(F): 98.6 (27 Oct 2023 18:40), Max: 98.7 (27 Oct 2023 17:13)  HR: 84 (27 Oct 2023 18:40) (84 - 86)  BP: 167/75 (27 Oct 2023 18:40) (155/84 - 167/75)  BP(mean): --  RR: 17 (27 Oct 2023 18:40) (17 - 20)  SpO2: 97% (27 Oct 2023 18:40) (97% - 98%)    Parameters below as of 27 Oct 2023 18:40  Patient On (Oxygen Delivery Method): room air    GENERAL: No acute distress, well-developed  HEAD:  Atraumatic, Normocephalic  ENT: EOMI,  Prosthetic left eye , R PRRLA, conjunctiva and sclera clear,  moist mucosa no pharyngeal erythema or exudates   NECK: supple , no JVD   CHEST/LUNG: Clear to auscultation, decreased breath sounds  bilaterally; No wheeze, equal breath sounds bilaterally   BACK: No spinal tenderness,  No CVA tenderness   HEART: Regular rate and rhythm; No murmurs, rubs, or gallops  ABDOMEN: ostomy present LLQ , abd Soft, Nontender, Nondistended; Bowel sounds present  EXTREMITIES:  No clubbing, cyanosis, or edema  MSK: No joint swelling or effusions, ROM intact   PSYCH: Normal behavior/affect  NEUROLOGY: AAOx3, non-focal, cranial nerves intact, motor 5/5 throughout , Finger to nose intact , SILT   SKIN: Normal color, No rashes or lesions

## 2023-10-27 NOTE — ED ADULT NURSE NOTE - OBJECTIVE STATEMENT
72 yo female with pmh colorectal CA s/p colostomy, AVR, TIA, COPD, DM presents to ED c/o AMS since yesterday. As per daughter, pt became confused and "couldn't remember things" starting around 1500 yesterday. Pt also endorsing cough. Pt denies fevers/chills, abdominal pain, n/v, cp, sob, h/a, vision changes, dizziness, weakness, numbness/tingling. Pt a&ox3 with episodes of forgetfulness/confusion noted, breathing spontaneous and unlabored, moving all extremities and following commands, skin warm dry and appropriate color. Pt placed on CM. Pt safety measures in place and comfort provided.

## 2023-10-28 DIAGNOSIS — J47.9 BRONCHIECTASIS, UNCOMPLICATED: ICD-10-CM

## 2023-10-28 DIAGNOSIS — I48.0 PAROXYSMAL ATRIAL FIBRILLATION: ICD-10-CM

## 2023-10-28 DIAGNOSIS — E11.9 TYPE 2 DIABETES MELLITUS WITHOUT COMPLICATIONS: ICD-10-CM

## 2023-10-28 DIAGNOSIS — I48.20 CHRONIC ATRIAL FIBRILLATION, UNSPECIFIED: ICD-10-CM

## 2023-10-28 DIAGNOSIS — Z95.2 PRESENCE OF PROSTHETIC HEART VALVE: ICD-10-CM

## 2023-10-28 DIAGNOSIS — J45.909 UNSPECIFIED ASTHMA, UNCOMPLICATED: ICD-10-CM

## 2023-10-28 DIAGNOSIS — U07.1 COVID-19: ICD-10-CM

## 2023-10-28 DIAGNOSIS — R41.82 ALTERED MENTAL STATUS, UNSPECIFIED: ICD-10-CM

## 2023-10-28 LAB
ALBUMIN SERPL ELPH-MCNC: 3.3 G/DL — SIGNIFICANT CHANGE UP (ref 3.3–5)
ALBUMIN SERPL ELPH-MCNC: 3.3 G/DL — SIGNIFICANT CHANGE UP (ref 3.3–5)
ALP SERPL-CCNC: 79 U/L — SIGNIFICANT CHANGE UP (ref 40–120)
ALP SERPL-CCNC: 79 U/L — SIGNIFICANT CHANGE UP (ref 40–120)
ALT FLD-CCNC: 16 U/L — SIGNIFICANT CHANGE UP (ref 10–45)
ALT FLD-CCNC: 16 U/L — SIGNIFICANT CHANGE UP (ref 10–45)
ANION GAP SERPL CALC-SCNC: 10 MMOL/L — SIGNIFICANT CHANGE UP (ref 5–17)
ANION GAP SERPL CALC-SCNC: 10 MMOL/L — SIGNIFICANT CHANGE UP (ref 5–17)
APTT BLD: 42.8 SEC — HIGH (ref 24.5–35.6)
APTT BLD: 42.8 SEC — HIGH (ref 24.5–35.6)
AST SERPL-CCNC: 18 U/L — SIGNIFICANT CHANGE UP (ref 10–40)
AST SERPL-CCNC: 18 U/L — SIGNIFICANT CHANGE UP (ref 10–40)
BASOPHILS # BLD AUTO: 0.02 K/UL — SIGNIFICANT CHANGE UP (ref 0–0.2)
BASOPHILS # BLD AUTO: 0.02 K/UL — SIGNIFICANT CHANGE UP (ref 0–0.2)
BASOPHILS NFR BLD AUTO: 0.2 % — SIGNIFICANT CHANGE UP (ref 0–2)
BASOPHILS NFR BLD AUTO: 0.2 % — SIGNIFICANT CHANGE UP (ref 0–2)
BILIRUB SERPL-MCNC: 0.1 MG/DL — LOW (ref 0.2–1.2)
BILIRUB SERPL-MCNC: 0.1 MG/DL — LOW (ref 0.2–1.2)
BUN SERPL-MCNC: 8 MG/DL — SIGNIFICANT CHANGE UP (ref 7–23)
BUN SERPL-MCNC: 8 MG/DL — SIGNIFICANT CHANGE UP (ref 7–23)
CALCIUM SERPL-MCNC: 8.8 MG/DL — SIGNIFICANT CHANGE UP (ref 8.4–10.5)
CALCIUM SERPL-MCNC: 8.8 MG/DL — SIGNIFICANT CHANGE UP (ref 8.4–10.5)
CHLORIDE SERPL-SCNC: 106 MMOL/L — SIGNIFICANT CHANGE UP (ref 96–108)
CHLORIDE SERPL-SCNC: 106 MMOL/L — SIGNIFICANT CHANGE UP (ref 96–108)
CO2 SERPL-SCNC: 26 MMOL/L — SIGNIFICANT CHANGE UP (ref 22–31)
CO2 SERPL-SCNC: 26 MMOL/L — SIGNIFICANT CHANGE UP (ref 22–31)
CREAT SERPL-MCNC: 0.54 MG/DL — SIGNIFICANT CHANGE UP (ref 0.5–1.3)
CREAT SERPL-MCNC: 0.54 MG/DL — SIGNIFICANT CHANGE UP (ref 0.5–1.3)
CULTURE RESULTS: SIGNIFICANT CHANGE UP
CULTURE RESULTS: SIGNIFICANT CHANGE UP
EGFR: 96 ML/MIN/1.73M2 — SIGNIFICANT CHANGE UP
EGFR: 96 ML/MIN/1.73M2 — SIGNIFICANT CHANGE UP
EOSINOPHIL # BLD AUTO: 0.05 K/UL — SIGNIFICANT CHANGE UP (ref 0–0.5)
EOSINOPHIL # BLD AUTO: 0.05 K/UL — SIGNIFICANT CHANGE UP (ref 0–0.5)
EOSINOPHIL NFR BLD AUTO: 0.6 % — SIGNIFICANT CHANGE UP (ref 0–6)
EOSINOPHIL NFR BLD AUTO: 0.6 % — SIGNIFICANT CHANGE UP (ref 0–6)
GLUCOSE BLDC GLUCOMTR-MCNC: 120 MG/DL — HIGH (ref 70–99)
GLUCOSE BLDC GLUCOMTR-MCNC: 120 MG/DL — HIGH (ref 70–99)
GLUCOSE BLDC GLUCOMTR-MCNC: 121 MG/DL — HIGH (ref 70–99)
GLUCOSE BLDC GLUCOMTR-MCNC: 121 MG/DL — HIGH (ref 70–99)
GLUCOSE BLDC GLUCOMTR-MCNC: 152 MG/DL — HIGH (ref 70–99)
GLUCOSE BLDC GLUCOMTR-MCNC: 152 MG/DL — HIGH (ref 70–99)
GLUCOSE BLDC GLUCOMTR-MCNC: 184 MG/DL — HIGH (ref 70–99)
GLUCOSE BLDC GLUCOMTR-MCNC: 184 MG/DL — HIGH (ref 70–99)
GLUCOSE BLDC GLUCOMTR-MCNC: 277 MG/DL — HIGH (ref 70–99)
GLUCOSE BLDC GLUCOMTR-MCNC: 277 MG/DL — HIGH (ref 70–99)
GLUCOSE SERPL-MCNC: 140 MG/DL — HIGH (ref 70–99)
GLUCOSE SERPL-MCNC: 140 MG/DL — HIGH (ref 70–99)
GRAM STN FLD: ABNORMAL
GRAM STN FLD: ABNORMAL
HCT VFR BLD CALC: 33.3 % — LOW (ref 34.5–45)
HCT VFR BLD CALC: 33.3 % — LOW (ref 34.5–45)
HGB BLD-MCNC: 9.6 G/DL — LOW (ref 11.5–15.5)
HGB BLD-MCNC: 9.6 G/DL — LOW (ref 11.5–15.5)
IMM GRANULOCYTES NFR BLD AUTO: 0.8 % — SIGNIFICANT CHANGE UP (ref 0–0.9)
IMM GRANULOCYTES NFR BLD AUTO: 0.8 % — SIGNIFICANT CHANGE UP (ref 0–0.9)
INR BLD: 3.77 RATIO — HIGH (ref 0.85–1.18)
INR BLD: 3.77 RATIO — HIGH (ref 0.85–1.18)
LACTATE BLDV-MCNC: 2 MMOL/L — SIGNIFICANT CHANGE UP (ref 0.5–2)
LACTATE BLDV-MCNC: 2 MMOL/L — SIGNIFICANT CHANGE UP (ref 0.5–2)
LYMPHOCYTES # BLD AUTO: 1.99 K/UL — SIGNIFICANT CHANGE UP (ref 1–3.3)
LYMPHOCYTES # BLD AUTO: 1.99 K/UL — SIGNIFICANT CHANGE UP (ref 1–3.3)
LYMPHOCYTES # BLD AUTO: 23.1 % — SIGNIFICANT CHANGE UP (ref 13–44)
LYMPHOCYTES # BLD AUTO: 23.1 % — SIGNIFICANT CHANGE UP (ref 13–44)
MCHC RBC-ENTMCNC: 20.9 PG — LOW (ref 27–34)
MCHC RBC-ENTMCNC: 20.9 PG — LOW (ref 27–34)
MCHC RBC-ENTMCNC: 28.8 GM/DL — LOW (ref 32–36)
MCHC RBC-ENTMCNC: 28.8 GM/DL — LOW (ref 32–36)
MCV RBC AUTO: 72.4 FL — LOW (ref 80–100)
MCV RBC AUTO: 72.4 FL — LOW (ref 80–100)
MONOCYTES # BLD AUTO: 1.04 K/UL — HIGH (ref 0–0.9)
MONOCYTES # BLD AUTO: 1.04 K/UL — HIGH (ref 0–0.9)
MONOCYTES NFR BLD AUTO: 12.1 % — SIGNIFICANT CHANGE UP (ref 2–14)
MONOCYTES NFR BLD AUTO: 12.1 % — SIGNIFICANT CHANGE UP (ref 2–14)
NEUTROPHILS # BLD AUTO: 5.45 K/UL — SIGNIFICANT CHANGE UP (ref 1.8–7.4)
NEUTROPHILS # BLD AUTO: 5.45 K/UL — SIGNIFICANT CHANGE UP (ref 1.8–7.4)
NEUTROPHILS NFR BLD AUTO: 63.2 % — SIGNIFICANT CHANGE UP (ref 43–77)
NEUTROPHILS NFR BLD AUTO: 63.2 % — SIGNIFICANT CHANGE UP (ref 43–77)
NRBC # BLD: 0 /100 WBCS — SIGNIFICANT CHANGE UP (ref 0–0)
NRBC # BLD: 0 /100 WBCS — SIGNIFICANT CHANGE UP (ref 0–0)
PLATELET # BLD AUTO: 354 K/UL — SIGNIFICANT CHANGE UP (ref 150–400)
PLATELET # BLD AUTO: 354 K/UL — SIGNIFICANT CHANGE UP (ref 150–400)
POTASSIUM SERPL-MCNC: 3.8 MMOL/L — SIGNIFICANT CHANGE UP (ref 3.5–5.3)
POTASSIUM SERPL-MCNC: 3.8 MMOL/L — SIGNIFICANT CHANGE UP (ref 3.5–5.3)
POTASSIUM SERPL-SCNC: 3.8 MMOL/L — SIGNIFICANT CHANGE UP (ref 3.5–5.3)
POTASSIUM SERPL-SCNC: 3.8 MMOL/L — SIGNIFICANT CHANGE UP (ref 3.5–5.3)
PROT SERPL-MCNC: 5.8 G/DL — LOW (ref 6–8.3)
PROT SERPL-MCNC: 5.8 G/DL — LOW (ref 6–8.3)
PROTHROM AB SERPL-ACNC: 39.9 SEC — HIGH (ref 9.5–13)
PROTHROM AB SERPL-ACNC: 39.9 SEC — HIGH (ref 9.5–13)
RBC # BLD: 4.6 M/UL — SIGNIFICANT CHANGE UP (ref 3.8–5.2)
RBC # BLD: 4.6 M/UL — SIGNIFICANT CHANGE UP (ref 3.8–5.2)
RBC # FLD: 22.1 % — HIGH (ref 10.3–14.5)
RBC # FLD: 22.1 % — HIGH (ref 10.3–14.5)
SODIUM SERPL-SCNC: 142 MMOL/L — SIGNIFICANT CHANGE UP (ref 135–145)
SODIUM SERPL-SCNC: 142 MMOL/L — SIGNIFICANT CHANGE UP (ref 135–145)
SPECIMEN SOURCE: SIGNIFICANT CHANGE UP
WBC # BLD: 8.62 K/UL — SIGNIFICANT CHANGE UP (ref 3.8–10.5)
WBC # BLD: 8.62 K/UL — SIGNIFICANT CHANGE UP (ref 3.8–10.5)
WBC # FLD AUTO: 8.62 K/UL — SIGNIFICANT CHANGE UP (ref 3.8–10.5)
WBC # FLD AUTO: 8.62 K/UL — SIGNIFICANT CHANGE UP (ref 3.8–10.5)

## 2023-10-28 PROCEDURE — 99222 1ST HOSP IP/OBS MODERATE 55: CPT | Mod: GC

## 2023-10-28 PROCEDURE — 71250 CT THORAX DX C-: CPT | Mod: 26

## 2023-10-28 PROCEDURE — 99222 1ST HOSP IP/OBS MODERATE 55: CPT

## 2023-10-28 RX ORDER — FAMOTIDINE 10 MG/ML
20 INJECTION INTRAVENOUS DAILY
Refills: 0 | Status: DISCONTINUED | OUTPATIENT
Start: 2023-10-28 | End: 2023-11-05

## 2023-10-28 RX ORDER — SENNA PLUS 8.6 MG/1
2 TABLET ORAL AT BEDTIME
Refills: 0 | Status: DISCONTINUED | OUTPATIENT
Start: 2023-10-28 | End: 2023-11-05

## 2023-10-28 RX ORDER — INFLUENZA VIRUS VACCINE 15; 15; 15; 15 UG/.5ML; UG/.5ML; UG/.5ML; UG/.5ML
0.7 SUSPENSION INTRAMUSCULAR ONCE
Refills: 0 | Status: DISCONTINUED | OUTPATIENT
Start: 2023-10-28 | End: 2023-11-05

## 2023-10-28 RX ORDER — POLYETHYLENE GLYCOL 3350 17 G/17G
17 POWDER, FOR SOLUTION ORAL DAILY
Refills: 0 | Status: DISCONTINUED | OUTPATIENT
Start: 2023-10-28 | End: 2023-11-05

## 2023-10-28 RX ORDER — IPRATROPIUM/ALBUTEROL SULFATE 18-103MCG
3 AEROSOL WITH ADAPTER (GRAM) INHALATION EVERY 6 HOURS
Refills: 0 | Status: DISCONTINUED | OUTPATIENT
Start: 2023-10-28 | End: 2023-11-05

## 2023-10-28 RX ORDER — ASCORBIC ACID 60 MG
500 TABLET,CHEWABLE ORAL DAILY
Refills: 0 | Status: DISCONTINUED | OUTPATIENT
Start: 2023-10-28 | End: 2023-11-05

## 2023-10-28 RX ORDER — ACETYLCYSTEINE 200 MG/ML
2 VIAL (ML) MISCELLANEOUS EVERY 8 HOURS
Refills: 0 | Status: DISCONTINUED | OUTPATIENT
Start: 2023-10-28 | End: 2023-11-05

## 2023-10-28 RX ORDER — MONTELUKAST 4 MG/1
10 TABLET, CHEWABLE ORAL DAILY
Refills: 0 | Status: DISCONTINUED | OUTPATIENT
Start: 2023-10-28 | End: 2023-11-05

## 2023-10-28 RX ORDER — ATORVASTATIN CALCIUM 80 MG/1
20 TABLET, FILM COATED ORAL AT BEDTIME
Refills: 0 | Status: DISCONTINUED | OUTPATIENT
Start: 2023-10-28 | End: 2023-11-05

## 2023-10-28 RX ORDER — PANTOPRAZOLE SODIUM 20 MG/1
40 TABLET, DELAYED RELEASE ORAL
Refills: 0 | Status: DISCONTINUED | OUTPATIENT
Start: 2023-10-28 | End: 2023-11-05

## 2023-10-28 RX ORDER — MEXILETINE HYDROCHLORIDE 150 MG/1
200 CAPSULE ORAL EVERY 8 HOURS
Refills: 0 | Status: DISCONTINUED | OUTPATIENT
Start: 2023-10-28 | End: 2023-11-05

## 2023-10-28 RX ORDER — GABAPENTIN 400 MG/1
100 CAPSULE ORAL EVERY 8 HOURS
Refills: 0 | Status: DISCONTINUED | OUTPATIENT
Start: 2023-10-28 | End: 2023-11-05

## 2023-10-28 RX ORDER — BUDESONIDE AND FORMOTEROL FUMARATE DIHYDRATE 160; 4.5 UG/1; UG/1
2 AEROSOL RESPIRATORY (INHALATION)
Refills: 0 | Status: DISCONTINUED | OUTPATIENT
Start: 2023-10-28 | End: 2023-11-05

## 2023-10-28 RX ORDER — IPRATROPIUM/ALBUTEROL SULFATE 18-103MCG
3 AEROSOL WITH ADAPTER (GRAM) INHALATION EVERY 6 HOURS
Refills: 0 | Status: DISCONTINUED | OUTPATIENT
Start: 2023-10-28 | End: 2023-10-28

## 2023-10-28 RX ORDER — WARFARIN SODIUM 2.5 MG/1
5 TABLET ORAL ONCE
Refills: 0 | Status: COMPLETED | OUTPATIENT
Start: 2023-10-28 | End: 2023-10-28

## 2023-10-28 RX ORDER — SODIUM CHLORIDE 9 MG/ML
4 INJECTION INTRAMUSCULAR; INTRAVENOUS; SUBCUTANEOUS EVERY 6 HOURS
Refills: 0 | Status: DISCONTINUED | OUTPATIENT
Start: 2023-10-28 | End: 2023-11-05

## 2023-10-28 RX ORDER — TIOTROPIUM BROMIDE AND OLODATEROL 3.124; 2.736 UG/1; UG/1
2 SPRAY, METERED RESPIRATORY (INHALATION) DAILY
Refills: 0 | Status: DISCONTINUED | OUTPATIENT
Start: 2023-10-28 | End: 2023-10-31

## 2023-10-28 RX ORDER — CLOPIDOGREL BISULFATE 75 MG/1
75 TABLET, FILM COATED ORAL DAILY
Refills: 0 | Status: DISCONTINUED | OUTPATIENT
Start: 2023-10-28 | End: 2023-11-05

## 2023-10-28 RX ADMIN — Medication 2 MILLILITER(S): at 23:19

## 2023-10-28 RX ADMIN — BUDESONIDE AND FORMOTEROL FUMARATE DIHYDRATE 2 PUFF(S): 160; 4.5 AEROSOL RESPIRATORY (INHALATION) at 05:26

## 2023-10-28 RX ADMIN — Medication 600 MILLIGRAM(S): at 18:23

## 2023-10-28 RX ADMIN — MEXILETINE HYDROCHLORIDE 200 MILLIGRAM(S): 150 CAPSULE ORAL at 02:57

## 2023-10-28 RX ADMIN — FAMOTIDINE 20 MILLIGRAM(S): 10 INJECTION INTRAVENOUS at 11:50

## 2023-10-28 RX ADMIN — Medication 3 MILLILITER(S): at 23:17

## 2023-10-28 RX ADMIN — PANTOPRAZOLE SODIUM 40 MILLIGRAM(S): 20 TABLET, DELAYED RELEASE ORAL at 05:26

## 2023-10-28 RX ADMIN — MONTELUKAST 10 MILLIGRAM(S): 4 TABLET, CHEWABLE ORAL at 11:50

## 2023-10-28 RX ADMIN — Medication 12 UNIT(S): at 13:18

## 2023-10-28 RX ADMIN — GABAPENTIN 100 MILLIGRAM(S): 400 CAPSULE ORAL at 11:49

## 2023-10-28 RX ADMIN — WARFARIN SODIUM 5 MILLIGRAM(S): 2.5 TABLET ORAL at 22:39

## 2023-10-28 RX ADMIN — SENNA PLUS 2 TABLET(S): 8.6 TABLET ORAL at 22:39

## 2023-10-28 RX ADMIN — Medication 500 MILLIGRAM(S): at 11:50

## 2023-10-28 RX ADMIN — INSULIN GLARGINE 16 UNIT(S): 100 INJECTION, SOLUTION SUBCUTANEOUS at 22:22

## 2023-10-28 RX ADMIN — Medication 1 TABLET(S): at 11:50

## 2023-10-28 RX ADMIN — Medication 8 MILLIGRAM(S): at 05:26

## 2023-10-28 RX ADMIN — MEXILETINE HYDROCHLORIDE 200 MILLIGRAM(S): 150 CAPSULE ORAL at 18:23

## 2023-10-28 RX ADMIN — Medication 1: at 00:26

## 2023-10-28 RX ADMIN — INSULIN GLARGINE 16 UNIT(S): 100 INJECTION, SOLUTION SUBCUTANEOUS at 00:28

## 2023-10-28 RX ADMIN — Medication 2 MILLILITER(S): at 14:57

## 2023-10-28 RX ADMIN — POLYETHYLENE GLYCOL 3350 17 GRAM(S): 17 POWDER, FOR SOLUTION ORAL at 22:39

## 2023-10-28 RX ADMIN — MEXILETINE HYDROCHLORIDE 200 MILLIGRAM(S): 150 CAPSULE ORAL at 11:48

## 2023-10-28 RX ADMIN — Medication 1: at 18:30

## 2023-10-28 RX ADMIN — TIOTROPIUM BROMIDE AND OLODATEROL 2 PUFF(S): 3.124; 2.736 SPRAY, METERED RESPIRATORY (INHALATION) at 14:15

## 2023-10-28 RX ADMIN — GABAPENTIN 100 MILLIGRAM(S): 400 CAPSULE ORAL at 02:57

## 2023-10-28 RX ADMIN — ATORVASTATIN CALCIUM 20 MILLIGRAM(S): 80 TABLET, FILM COATED ORAL at 22:39

## 2023-10-28 RX ADMIN — BUDESONIDE AND FORMOTEROL FUMARATE DIHYDRATE 2 PUFF(S): 160; 4.5 AEROSOL RESPIRATORY (INHALATION) at 18:22

## 2023-10-28 RX ADMIN — CLOPIDOGREL BISULFATE 75 MILLIGRAM(S): 75 TABLET, FILM COATED ORAL at 11:50

## 2023-10-28 RX ADMIN — SODIUM CHLORIDE 4 MILLILITER(S): 9 INJECTION INTRAMUSCULAR; INTRAVENOUS; SUBCUTANEOUS at 23:18

## 2023-10-28 RX ADMIN — GABAPENTIN 100 MILLIGRAM(S): 400 CAPSULE ORAL at 18:22

## 2023-10-28 NOTE — PHYSICAL THERAPY INITIAL EVALUATION ADULT - TRANSFER TRAINING, PT EVAL
GOAL: Patient will perform sit to stand transfers independently at rolling walker with proper hand placement in 1-2 weeks.

## 2023-10-28 NOTE — PHYSICAL THERAPY INITIAL EVALUATION ADULT - STRENGTHENING, PT EVAL
GOAL: Patient will demonstrate a 1/3 increase in strength where deficient to assist with performing functional mobility and ADLs in 1-2 weeks

## 2023-10-28 NOTE — PROGRESS NOTE ADULT - ASSESSMENT
72F w/ asthma on chronic steroids, bronchiectasis,  recurrent PNA,  tracheomalacia s/p tracheoplasty, DM  paroxysmal A-fib on coumadin, colorectal cancer s/p colectomy and ostomy ,  s/p AVR (2022) and  TAVR- MERLIN 9 on 9/6 , recent shingles , as well as covid infection;  p/w AMS x few days          Altered mental state.   ·  Plan: unsteady gait w/ episodic confusion , now at baseline mental state , no acute findings on CT Head ,  no focal deficits on exam , mild leukocytosis but no other evidence for acute infection , hyperglycemic  but no other major metobolic derangements , CO2 within acceptable range , and no hypoxic   - Neuro-checks   - MR brain   - d/c scopolamine patch  - will consider neuro eval  - fall precautions   - PT.    Asthma.   ·  Plan: controlled   - Symbicort  ( (therapeutic equivalent for Breo)   - Duonebs PRN   - Tiotropium   - montelukast   - continue home dose Solumedrol.    Bronchiectasis.   ·  Plan: ongoing cough , not improved with recent course of doxycycline , will hold off on further ABX until evaluated by pulmonary given extensive history of allergies   - sputum cultures   - Acetylcysteine via nebulizer q8h    - d/c  Scopalamine patch   - Pulmonary follow up    Diabetes mellitus.   ·  Plan: Reduced home dose while inpatient , can uptitrate as tolerates  - Lantus  hs   - Lispro w/ meals   - insulin correction scale  - check fsg qac/qhs  - check a1c in am  - consistent carb diet.     Paroxysmal atrial fibrillation.   ·  Plan: INR 3.2 , taking 7.5mg at home , will reduce to 5   - warfarin 5mg x 1   - monitor daily INR.    S/P TAVR (transcatheter aortic valve replacement).   ·  Plan: - Continue with Plavix 75 mg PO daily   -  Continue with Mexiletine 200 mg every 8 hours   -  Continue with Atorvastatin 20 mg PO HS.    2019 novel coronavirus disease (COVID-19).   ·  Plan: previously positive 9/29 , treated with remdesivir at that time , low suspicion for true infection , however patient is immunocompromised will therefore maintain on contact isolation until further evaluated by ID , not hypoxic , no indication for further treatment at this time   - isolation precautions   - ID consult will see pt today

## 2023-10-28 NOTE — PHYSICAL THERAPY INITIAL EVALUATION ADULT - NSPTDISCHREC_GEN_A_CORE
if home, pt would require assist at all times with OOB mobility, Home PT, and a RW (she owns one)/[facility]

## 2023-10-28 NOTE — PHYSICAL THERAPY INITIAL EVALUATION ADULT - ADDITIONAL COMMENTS
Pt lives at home with her daughter in a private house, with 14 steps to her bedroom with single HR.  Pt was previously independent in all areas of mobility, including stairs, using a rolling walker outdoors only.  for the last 3 weeks, pt was required assistance for ambulation indoors from her daughter, and was able to bump up the stairs on her backside, and walk down the stairs sideways with one handrail with assistance.

## 2023-10-28 NOTE — CONSULT NOTE ADULT - SUBJECTIVE AND OBJECTIVE BOX
CHIEF COMPLAINT:    HPI:  HPI:  Patient is a 72 female with PMH of asthma on chronic steroids, bronchiectasis, allergic rhinitis,  recurrent PNA,  tracheomalacia s/p tracheoplasty, ESBL proteus infections (sputum),  diabetes, paroxysmal A-fib on coumadin, colorectal cancer many years ago status post colostomy, recent hospitalization at an outside hospital  for acute respiratory failure with hypoxia secondary to asthma/COPD exacerbation and bronchopneumonia 6/5/2023 - 6/16/2023), and AVR 2022 ( Dr. Cabrera)   s/p  TAVR- [facility] 9 on 9/6 by Dr. Gonsalves and Dr. Leahy) , recently admitted (9/12- 10/01/23) for SOB, treated for bronchectasis flare, during course treated fo shingles , as well as covid infection; patient now presents for altered mental state for the past two days.   For the past two weeks, patient reports feeling unsteady on her feet and difficulty ambulating , needing assitance to walk. She reports no numbness or tingling and focal weakness. During this time she reports an ongoing cough with thick yellow-white sputum . She contacted her pulmonologist and was treated with a ten day course of doxycycline which was recently completed. On day prior to admission. patient became confused and "couldn't remember things" and became disoriented. She reports no fever or chills, cough remains unchanged. She has no chest pain and no shorntess of breath. No sick contacts.   (27 Oct 2023 23:12)    she follows [facility] as an outpatient.   73 year old female with a history of abnormal CXR/chest CT, allergic rhinitis, severe persistent asthma, bronchiectasis, GERD, COPD, recurrent PNA, PND, s/p tracheoplasty, TBM, and SOB     sputum as an outpatient grew acinetobacter, steno and MRSA, recently on doxy for steno and prednisone. has chronic cough, increased at baseline. no hemopytsis. denies wheezing. not consistent with her use of bronchodilators, chest vest    PAST MEDICAL & SURGICAL HISTORY:  Atrial fibrillation  paroxysmal, on eliquis      Diabetes  Type 2      COPD (chronic obstructive pulmonary disease)      Adrenal insufficiency  Medrol daily for over 50 years      Aortic insufficiency  moderate AR on echo 5/3/2018      Pelvic fracture      Asthma      Tracheobronchomalacia  diagnosed 2015, s/p bronchial thermoplasty 2016 (Dr Zapien); recent bronchoscopy 6/5/2018 revealed no evidence of tracheobronchomalacia in trachea or bronchial tubes      Colorectal cancer  4/2018- last treatment , chemo and radiation      Rectal bleeding      Seizure  x 1 1/7/18      DVT (deep venous thrombosis)  15-20 years ago, took coumadin      TIA (transient ischemic attack)  multiple, last 5 years ago - presents as right-sided weakness      History of partial hysterectomy  30 years ago - fibroids      H/O total knee replacement, bilateral  5 years ago      History of sinus surgery  multiple sinus surgeries      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)- course complicated by obstruction, respiratory failure, multiple CPR attempts -  stent discontinued; 10/20/2016 Tracheobronchoplasty (Prolene Mesh) performed at [facility] by Dr Zapien      S/P bronchoscopy  6/5/2018 - [facility] (Dr Zapien) no evidence of tracheobronchomalacia in trachea or bronchial tubes      Rectal bleeding  exam under anesthesia ([facility]) 2/2018      S/P TAVR (transcatheter aortic valve replacement)      S/P aortic valve replacement          FAMILY HISTORY:  Family history of asthma    Family history of breast cancer (Sibling)    Family history of diabetes mellitus type II        SOCIAL HISTORY:  never smoker    Allergies    penicillin (Anaphylaxis)  iodine (Short breath; Swelling)  tetanus toxoid (Short breath)  Avelox (Short breath; Pruritus)  aspirin (Short breath)  Dilaudid (Short breath)  codeine (Short breath)  Valium (Short breath)  shellfish (Anaphylaxis)  cefepime (Anaphylaxis)    Intolerances        HOME MEDICATIONS:  Home Medications:  Albuterol (Eqv-ProAir HFA) 90 mcg/inh inhalation aerosol: 2 puff(s) inhaled every 4 to 6 hours as needed for  shortness of breath and/or wheezing (27 Oct 2023 23:23)  Basaglar KwikPen 100 units/mL subcutaneous solution: 16 unit(s) subcutaneous once a day (at bedtime) and inject 6 units once a day in the morning (27 Oct 2023 23:12)  budesonide 0.5 mg/2 mL inhalation suspension: 2 milliliter(s) by nebulizer 2 times a day (27 Oct 2023 23:24)  famotidine 20 mg oral tablet: 1 tab(s) orally once a day (27 Oct 2023 23:24)  HumaLOG KwikPen 100 units/mL injectable solution: Inject as per sliding scale (27 Oct 2023 23:14)  Medrol 8 mg oral tablet: 1 tab(s) orally once a day (27 Oct 2023 23:24)  montelukast 10 mg oral tablet: 1 tab(s) orally once a day (27 Oct 2023 23:24)  ondansetron 4 mg oral tablet: 1 tab(s) orally every 6 hours as needed for  nausea (27 Oct 2023 23:24)  scopolamine 1 mg/72 hr transdermal film, extended release: 1 patch transdermally every 3 days (27 Oct 2023 23:24)  Warfarin: Take 7.5 mg by mouth once a day (27 Oct 2023 23:24)      REVIEW OF SYSTEMS:  Patient denies fevers, chills, chest pain, shortness of breath, nausea, abdominal pain, diarrhea, constipation, dysuria, leg swelling, headache, light headedness    OBJECTIVE:  ICU Vital Signs Last 24 Hrs  T(C): 36.5 (28 Oct 2023 05:56), Max: 37.1 (27 Oct 2023 17:13)  T(F): 97.7 (28 Oct 2023 05:56), Max: 98.7 (27 Oct 2023 17:13)  HR: 62 (28 Oct 2023 05:56) (62 - 86)  BP: 133/77 (28 Oct 2023 05:56) (119/60 - 167/75)  BP(mean): --  ABP: --  ABP(mean): --  RR: 18 (28 Oct 2023 05:56) (15 - 20)  SpO2: 97% (28 Oct 2023 05:56) (97% - 99%)    O2 Parameters below as of 28 Oct 2023 05:56  Patient On (Oxygen Delivery Method): room air              10-27 @ 07:01  -  10-28 @ 07:00  --------------------------------------------------------  IN: 0 mL / OUT: 800 mL / NET: -800 mL      CAPILLARY BLOOD GLUCOSE      POCT Blood Glucose.: 121 mg/dL (28 Oct 2023 11:30)      PHYSICAL EXAM:  General: awake and alert, nontoxic appearing *** lying in bed  HEENT: NC/AT, EOMI b/l, conjunctiva normal, MMM  Lymph Nodes: no cervical LAD  Neck: supple. full range of motion  Respiratory: coarse rhonchi  Cardiovascular: S1 S2 present, RRR, no m/r/g  Abdomen: soft, NT/ND, +BS  Extremities: no c/c/e  Skin: no rashes or lesions noted  Neurological: AAOx3, no focal deficits  Psychiatry: calm, cooperative    LINES:     HOSPITAL MEDICATIONS:  Standing Meds:  acetylcysteine 10%  Inhalation 2 milliLiter(s) Inhalation every 8 hours  ascorbic acid 500 milliGRAM(s) Oral daily  atorvastatin 20 milliGRAM(s) Oral at bedtime  budesonide 160 MICROgram(s)/formoterol 4.5 MICROgram(s) Inhaler 2 Puff(s) Inhalation two times a day  clopidogrel Tablet 75 milliGRAM(s) Oral daily  dextrose 5%. 1000 milliLiter(s) IV Continuous <Continuous>  dextrose 5%. 1000 milliLiter(s) IV Continuous <Continuous>  dextrose 50% Injectable 12.5 Gram(s) IV Push once  dextrose 50% Injectable 25 Gram(s) IV Push once  famotidine    Tablet 20 milliGRAM(s) Oral daily  gabapentin 100 milliGRAM(s) Oral every 8 hours  glucagon  Injectable 1 milliGRAM(s) IntraMuscular once  influenza  Vaccine (HIGH DOSE) 0.7 milliLiter(s) IntraMuscular once  insulin glargine Injectable (LANTUS) 16 Unit(s) SubCutaneous at bedtime  insulin lispro (ADMELOG) corrective regimen sliding scale   SubCutaneous three times a day before meals  insulin lispro (ADMELOG) corrective regimen sliding scale   SubCutaneous at bedtime  insulin lispro Injectable (ADMELOG) 12 Unit(s) SubCutaneous three times a day before meals  methylPREDNISolone 8 milliGRAM(s) Oral daily  mexiletine 200 milliGRAM(s) Oral every 8 hours  montelukast 10 milliGRAM(s) Oral daily  multivitamin 1 Tablet(s) Oral daily  pantoprazole    Tablet 40 milliGRAM(s) Oral before breakfast  senna 2 Tablet(s) Oral at bedtime  tiotropium 2.5 MICROgram(s)/olodaterol 2.5 MICROgram(s) (STIOLTO) Inhaler 2 Puff(s) Inhalation daily  warfarin 5 milliGRAM(s) Oral once      PRN Meds:  acetaminophen     Tablet .. 650 milliGRAM(s) Oral every 6 hours PRN  albuterol/ipratropium for Nebulization 3 milliLiter(s) Nebulizer every 6 hours PRN  dextrose Oral Gel 15 Gram(s) Oral once PRN  melatonin 3 milliGRAM(s) Oral at bedtime PRN  ondansetron Injectable 4 milliGRAM(s) IV Push every 8 hours PRN      LABS:                        9.6    8.62  )-----------( 354      ( 28 Oct 2023 07:11 )             33.3     Hgb Trend: 9.6<--, 10.5<--  10-28    142  |  106  |  8   ----------------------------<  140<H>  3.8   |  26  |  0.54    Ca    8.8      28 Oct 2023 07:05    TPro  5.8<L>  /  Alb  3.3  /  TBili  0.1<L>  /  DBili  x   /  AST  18  /  ALT  16  /  AlkPhos  79  10-28    Creatinine Trend: 0.54<--, 0.58<--, 0.54<--, 0.54<--, 0.57<--  PT/INR - ( 28 Oct 2023 07:11 )   PT: 39.9 sec;   INR: 3.77 ratio         PTT - ( 28 Oct 2023 07:11 )  PTT:42.8 sec  Urinalysis Basic - ( 28 Oct 2023 07:05 )    Color: x / Appearance: x / SG: x / pH: x  Gluc: 140 mg/dL / Ketone: x  / Bili: x / Urobili: x   Blood: x / Protein: x / Nitrite: x   Leuk Esterase: x / RBC: x / WBC x   Sq Epi: x / Non Sq Epi: x / Bacteria: x        Venous Blood Gas:  10-28 @ 07:11  --/--/--/--/--  VBG Lactate: 2.0  Venous Blood Gas:  10-27 @ 19:00  7.36/49/51/28/79.5  VBG Lactate: 2.4      MICROBIOLOGY:       RADIOLOGY:  [ ] Reviewed and interpreted by me    PULMONARY FUNCTION TESTS:    EKG: Render Post-Care Instructions In Note?: no Duration Of Freeze Thaw-Cycle (Seconds): 6 Duration Of Freeze Thaw-Cycle (Seconds): 5 Detail Level: Detailed Medical Necessity Clause: This procedure was medically necessary because the lesions that were treated were: CHIEF COMPLAINT:  Unsteady gait  Yellow sputum production      HPI:  Patient is a 72 female with PMH of asthma on chronic steroids, bronchiectasis, allergic rhinitis,  recurrent PNA,  tracheomalacia s/p tracheoplasty, ESBL proteus infections (sputum),  diabetes, paroxysmal A-fib on coumadin, colorectal cancer many years ago status post colostomy, recent hospitalization at an outside hospital  for acute respiratory failure with hypoxia secondary to asthma/COPD exacerbation and bronchopneumonia 6/5/2023 - 6/16/2023), and AVR 2022 ( Dr. Cabrera)   s/p  TAVR- [facility] 9 on 9/6 by Dr. Gonsalves and Dr. Leahy) , recently admitted (9/12- 10/01/23) for SOB, treated for bronchectasis flare, during course treated fo shingles , as well as covid infection; patient now presents for altered mental state for the past two days.   For the past two weeks, patient reports feeling unsteady on her feet and difficulty ambulating , needing assitance to walk. She reports no numbness or tingling and focal weakness. During this time she reports an ongoing cough with thick yellow-white sputum . She contacted her pulmonologist and was treated with a ten day course of doxycycline which was recently completed. On day prior to admission. patient became confused and "couldn't remember things" and became disoriented. She reports no fever or chills, cough remains unchanged. She has no chest pain and no shorntess of breath. No sick contacts.   (27 Oct 2023 23:12)    she follows dr. frazier as an outpatient.   73 year old female with a history of abnormal CXR/chest CT, allergic rhinitis, severe persistent asthma, bronchiectasis, GERD, COPD, recurrent PNA, PND, s/p tracheoplasty, TBM, and SOB     sputum as an outpatient grew acinetobacter, steno and MRSA, recently on doxy for steno and prednisone. has chronic cough, increased from baseline. denies hemoptysis. denies wheezing. does not consistently use bronchodilators, chest vest    PAST MEDICAL & SURGICAL HISTORY:  Atrial fibrillation  paroxysmal, on eliquis      Diabetes  Type 2      COPD (chronic obstructive pulmonary disease)      Adrenal insufficiency  Medrol daily for over 50 years      Aortic insufficiency  moderate AR on echo 5/3/2018      Pelvic fracture      Asthma      Tracheobronchomalacia  diagnosed 2015, s/p bronchial thermoplasty 2016 (Dr Zapien); recent bronchoscopy 6/5/2018 revealed no evidence of tracheobronchomalacia in trachea or bronchial tubes      Colorectal cancer  4/2018- last treatment , chemo and radiation      Rectal bleeding      Seizure  x 1 1/7/18      DVT (deep venous thrombosis)  15-20 years ago, took coumadin      TIA (transient ischemic attack)  multiple, last 5 years ago - presents as right-sided weakness      History of partial hysterectomy  30 years ago - fibroids      H/O total knee replacement, bilateral  5 years ago      History of sinus surgery  multiple sinus surgeries      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      H/O pelvic surgery  5 years ago - s/p fracture      History of tracheomalacia  2015 - attempted tracheal stenting ([facility])- course complicated by obstruction, respiratory failure, multiple CPR attempts -  stent discontinued; 10/20/2016 Tracheobronchoplasty (Prolene Mesh) performed at [facility] by Dr Zapien      S/P bronchoscopy  6/5/2018 - [place] [facility] (Dr Zapien) no evidence of tracheobronchomalacia in trachea or bronchial tubes      Rectal bleeding  exam under anesthesia ([facility]) 2/2018      S/P TAVR (transcatheter aortic valve replacement)      S/P aortic valve replacement          FAMILY HISTORY:  Family history of asthma    Family history of breast cancer (Sibling)    Family history of diabetes mellitus type II        SOCIAL HISTORY:  never smoker    Allergies    penicillin (Anaphylaxis)  iodine (Short breath; Swelling)  tetanus toxoid (Short breath)  Avelox (Short breath; Pruritus)  aspirin (Short breath)  Dilaudid (Short breath)  codeine (Short breath)  Valium (Short breath)  shellfish (Anaphylaxis)  cefepime (Anaphylaxis)    Intolerances        HOME MEDICATIONS:  Home Medications:  Albuterol (Eqv-ProAir HFA) 90 mcg/inh inhalation aerosol: 2 puff(s) inhaled every 4 to 6 hours as needed for  shortness of breath and/or wheezing (27 Oct 2023 23:23)  Basaglar KwikPen 100 units/mL subcutaneous solution: 16 unit(s) subcutaneous once a day (at bedtime) and inject 6 units once a day in the morning (27 Oct 2023 23:12)  budesonide 0.5 mg/2 mL inhalation suspension: 2 milliliter(s) by nebulizer 2 times a day (27 Oct 2023 23:24)  famotidine 20 mg oral tablet: 1 tab(s) orally once a day (27 Oct 2023 23:24)  HumaLOG KwikPen 100 units/mL injectable solution: Inject as per sliding scale (27 Oct 2023 23:14)  Medrol 8 mg oral tablet: 1 tab(s) orally once a day (27 Oct 2023 23:24)  montelukast 10 mg oral tablet: 1 tab(s) orally once a day (27 Oct 2023 23:24)  ondansetron 4 mg oral tablet: 1 tab(s) orally every 6 hours as needed for  nausea (27 Oct 2023 23:24)  scopolamine 1 mg/72 hr transdermal film, extended release: 1 patch transdermally every 3 days (27 Oct 2023 23:24)  Warfarin: Take 7.5 mg by mouth once a day (27 Oct 2023 23:24)      REVIEW OF SYSTEMS:  CONSTITUTIONAL: No fevers  EYES: No eye pain, visual disturbances, or discharge  ENMT:  No difficulty hearing, tinnitus, vertigo; No sinus or throat pain  NECK: No pain or stiffness  RESPIRATORY: Productive cough.  CARDIOVASCULAR: No chest pain, palpitations, dizziness  GASTROINTESTINAL: No abdominal or epigastric pain. No nausea, vomiting, or hematemesis; No diarrhea or constipation. No melena or hematochezia.  GENITOURINARY: No dysuria, frequency, hematuria, or incontinence  NEUROLOGICAL: No headaches, memory loss, loss of strength, numbness, or tremors  SKIN: No itching, burning, rashes, or lesions   LYMPH NODES: No enlarged glands  ENDOCRINE: No heat or cold intolerance; No hair loss  MUSCULOSKELETAL: No joint pain or swelling; No muscle, back, or extremity pain  PSYCHIATRIC: No depression, anxiety, mood swings, or difficulty sleeping  HEME/LYMPH: No easy bruising, or bleeding gums  ALLERGY AND IMMUNOLOGIC: No hives or eczema    OBJECTIVE:  ICU Vital Signs Last 24 Hrs  T(C): 36.5 (28 Oct 2023 05:56), Max: 37.1 (27 Oct 2023 17:13)  T(F): 97.7 (28 Oct 2023 05:56), Max: 98.7 (27 Oct 2023 17:13)  HR: 62 (28 Oct 2023 05:56) (62 - 86)  BP: 133/77 (28 Oct 2023 05:56) (119/60 - 167/75)  BP(mean): --  ABP: --  ABP(mean): --  RR: 18 (28 Oct 2023 05:56) (15 - 20)  SpO2: 97% (28 Oct 2023 05:56) (97% - 99%)    O2 Parameters below as of 28 Oct 2023 05:56  Patient On (Oxygen Delivery Method): room air              10-27 @ 07:01  -  10-28 @ 07:00  --------------------------------------------------------  IN: 0 mL / OUT: 800 mL / NET: -800 mL      CAPILLARY BLOOD GLUCOSE      POCT Blood Glucose.: 121 mg/dL (28 Oct 2023 11:30)      PHYSICAL EXAM:  General: awake and alert, nontoxic appearing, lying in bed  HEENT: NC/AT, EOMI b/l, conjunctiva normal, MMM  Lymph Nodes: no cervical LAD  Neck: supple. full range of motion  Respiratory: coarse rhonchi  Cardiovascular: S1 S2 present, RRR, no m/r/g  Abdomen: soft, NT/ND, +BS  Extremities: no c/c/e  Skin: no rashes or lesions noted  Neurological: AAOx3, no focal deficits  Psychiatry: calm, cooperative    LINES:     HOSPITAL MEDICATIONS:  Standing Meds:  acetylcysteine 10%  Inhalation 2 milliLiter(s) Inhalation every 8 hours  ascorbic acid 500 milliGRAM(s) Oral daily  atorvastatin 20 milliGRAM(s) Oral at bedtime  budesonide 160 MICROgram(s)/formoterol 4.5 MICROgram(s) Inhaler 2 Puff(s) Inhalation two times a day  clopidogrel Tablet 75 milliGRAM(s) Oral daily  dextrose 5%. 1000 milliLiter(s) IV Continuous <Continuous>  dextrose 5%. 1000 milliLiter(s) IV Continuous <Continuous>  dextrose 50% Injectable 12.5 Gram(s) IV Push once  dextrose 50% Injectable 25 Gram(s) IV Push once  famotidine    Tablet 20 milliGRAM(s) Oral daily  gabapentin 100 milliGRAM(s) Oral every 8 hours  glucagon  Injectable 1 milliGRAM(s) IntraMuscular once  influenza  Vaccine (HIGH DOSE) 0.7 milliLiter(s) IntraMuscular once  insulin glargine Injectable (LANTUS) 16 Unit(s) SubCutaneous at bedtime  insulin lispro (ADMELOG) corrective regimen sliding scale   SubCutaneous three times a day before meals  insulin lispro (ADMELOG) corrective regimen sliding scale   SubCutaneous at bedtime  insulin lispro Injectable (ADMELOG) 12 Unit(s) SubCutaneous three times a day before meals  methylPREDNISolone 8 milliGRAM(s) Oral daily  mexiletine 200 milliGRAM(s) Oral every 8 hours  montelukast 10 milliGRAM(s) Oral daily  multivitamin 1 Tablet(s) Oral daily  pantoprazole    Tablet 40 milliGRAM(s) Oral before breakfast  senna 2 Tablet(s) Oral at bedtime  tiotropium 2.5 MICROgram(s)/olodaterol 2.5 MICROgram(s) (STIOLTO) Inhaler 2 Puff(s) Inhalation daily  warfarin 5 milliGRAM(s) Oral once      PRN Meds:  acetaminophen     Tablet .. 650 milliGRAM(s) Oral every 6 hours PRN  albuterol/ipratropium for Nebulization 3 milliLiter(s) Nebulizer every 6 hours PRN  dextrose Oral Gel 15 Gram(s) Oral once PRN  melatonin 3 milliGRAM(s) Oral at bedtime PRN  ondansetron Injectable 4 milliGRAM(s) IV Push every 8 hours PRN      LABS:                        9.6    8.62  )-----------( 354      ( 28 Oct 2023 07:11 )             33.3     Hgb Trend: 9.6<--, 10.5<--  10-28    142  |  106  |  8   ----------------------------<  140<H>  3.8   |  26  |  0.54    Ca    8.8      28 Oct 2023 07:05    TPro  5.8<L>  /  Alb  3.3  /  TBili  0.1<L>  /  DBili  x   /  AST  18  /  ALT  16  /  AlkPhos  79  10-28    Creatinine Trend: 0.54<--, 0.58<--, 0.54<--, 0.54<--, 0.57<--  PT/INR - ( 28 Oct 2023 07:11 )   PT: 39.9 sec;   INR: 3.77 ratio         PTT - ( 28 Oct 2023 07:11 )  PTT:42.8 sec  Urinalysis Basic - ( 28 Oct 2023 07:05 )    Color: x / Appearance: x / SG: x / pH: x  Gluc: 140 mg/dL / Ketone: x  / Bili: x / Urobili: x   Blood: x / Protein: x / Nitrite: x   Leuk Esterase: x / RBC: x / WBC x   Sq Epi: x / Non Sq Epi: x / Bacteria: x        Venous Blood Gas:  10-28 @ 07:11  --/--/--/--/--  VBG Lactate: 2.0  Venous Blood Gas:  10-27 @ 19:00  7.36/49/51/28/79.5  VBG Lactate: 2.4      MICROBIOLOGY:       RADIOLOGY:  [x] Reviewed and interpreted by me  CT Chest 9/28 - multifocal distal clusters of nodules    PULMONARY FUNCTION TESTS:    EKG: Post-Care Instructions: I reviewed with the patient in detail post-care instructions. Patient is to wear sunprotection, and avoid picking at any of the treated lesions. Pt may apply Vaseline to crusted or scabbing areas. Number Of Freeze-Thaw Cycles: 1 freeze-thaw cycle Consent: The patient's consent was obtained including but not limited to risks of crusting, scabbing, blistering, scarring, darker or lighter pigmentary change, recurrence, incomplete removal and infection. Medical Necessity Information: It is in your best interest to select a reason for this procedure from the list below. All of these items fulfill various CMS LCD requirements except the new and changing color options.

## 2023-10-28 NOTE — ED ADULT NURSE REASSESSMENT NOTE - NS ED NURSE REASSESS COMMENT FT1
contacted pharmacy for pt's home medication: Mexiletine 200mg. Will send to [facility]'s tube station.

## 2023-10-28 NOTE — CONSULT NOTE ADULT - ASSESSMENT
Patient is a 72 female with PMH of asthma on chronic steroids, bronchiectasis, allergic rhinitis,  recurrent PNA,  tracheomalacia s/p tracheoplasty, ESBL proteus infections (sputum),  diabetes, paroxysmal A-fib on coumadin, colorectal cancer many years ago status post colostomy, recent hospitalization at an outside hospital  for acute respiratory failure with hypoxia secondary to asthma/COPD exacerbation and bronchopneumonia 6/5/2023 - 6/16/2023), and AVR 2022 ( Dr. Cabrera)   s/p  TAVR- [facility] 9 on 9/6 by Dr. Gonsalves and Dr. Leahy) , recently admitted (9/12- 10/01/23) for SOB, treated for bronchectasis flare, during course treated fo shingles , as well as covid infection; patient now presents for altered mental state for the past two days now resolved. Pulmonary consulted for possible bronchiectasis flare.     #recommendations  cough productive and not at baseline would treat for bronchiectasis flare  please start abx covering recent steno sputum as per ID: levaquin vs bactrim  f/u repeat sputum culture  please start methyl pred 32 oral daily will plan for prolonged taper   patient requesting only medrol  continue symbicort 160 2 puffs BID, duoneb q6 hr standing   please start airway clearance: humidified oxygen, standing guaifenesin, duonebs q6h, hyper sal q6h, chest PT q6 h, aerobika/acapella q6 h, deep NT suction q6 hr, chest vest q12h  titrate oxygen to O2 >88%, use humidified oxygen  incentive spirometry, OOB to chair, PT/OT  aspiration precautions as necessary  DVT ppx as tolerated  airway clearance as necessary Patient is a 72 female with PMH of asthma on chronic steroids, bronchiectasis, allergic rhinitis,  recurrent PNA,  tracheomalacia s/p tracheoplasty, ESBL proteus infections (sputum),  diabetes, paroxysmal A-fib on coumadin, colorectal cancer many years ago status post colostomy, recent hospitalization at an outside hospital  for acute respiratory failure with hypoxia secondary to asthma/COPD exacerbation and bronchopneumonia 6/5/2023 - 6/16/2023), and AVR 2022 ( Dr. Cabrera)   s/p  TAVR- [facility] 9 on 9/6 by Dr. Gonsalves and Dr. Leahy) , recently admitted (9/12- 10/01/23) for SOB, treated for bronchectasis flare, during course treated fo shingles , as well as covid infection; patient now presents for altered mental state for the past two days now resolved. Pulmonary consulted for cough, sputum production    #recommendations  cough productive worse than baseline  concern for bronchiectasis exacerbation - no fever or leukocytosis today.   possible asthma contributing - can increase chronic steroid dose to 32 mg methylprednisolone (patient requesting only methylprednislone)  will discuss with ID re: abx use  f/u repeat sputum culture  continue symbicort 160 2 puffs BID, duoneb q6 hr standing   please start airway clearance: humidified oxygen, duonebs q6h, 3% nebulized saline q6h, chest PT q8 h, aerobika/acapella q6 h, chest vest q12h  titrate oxygen to O2 >88%, use humidified oxygen  incentive spirometry, OOB to chair, PT/OT  DVT ppx as tolerated

## 2023-10-28 NOTE — PHYSICAL THERAPY INITIAL EVALUATION ADULT - PATIENT/FAMILY AGREES WITH PLAN
Addended by: FERNANDO KAMINSKI on: 3/24/2022 03:40 PM     Modules accepted: Orders     pt would like to go home with home PT despite rec of [facility]

## 2023-10-28 NOTE — PHYSICAL THERAPY INITIAL EVALUATION ADULT - GAIT TRAINING, PT EVAL
GOAL: Pt will be able to ambulate 150 feet with independence with appropriate assistive device in 1-2 weeks.

## 2023-10-28 NOTE — CONSULT NOTE ADULT - SUBJECTIVE AND OBJECTIVE BOX
Patient is a 73 Female who presents with a chief complaint of wobbly gait     HPI:  73 F PMH asthma on steroids, bronchiectasis, allergic rhinitis, recurrent pneumonia, tracheomalacia s/p tracheoplasty, Diabetes, AF on Coumadin, colorectal cancer s/p colostomy, AVR 2022 and s/p TAVR - [facility] in September 2023, recently admitted September to Oct 2023 for bronchiectasis flare, Shingles, Covid-19 s/p Remdesivir, who now presents to the ED with c/o gait dysfunction.     Gait dysfunction for the last 2 days. Her cough is at baseline. Denies fevers, chills, night sweats, rash, dysuria, loose stools, sick contacts, She tested + for Covid-19 (she was + 9/27/23 as well). ID consulted for recommendations.     REVIEW OF SYSTEMS  Constitutional: No fevers  Skin: No rash  Eyes: No discharge	  ENMT: No sore throat  Respiratory: No SOB. Chronic cough at baseline   Cardiovascular:  No chest pain  Gastrointestinal: No pain, nausea, vomiting  Genitourinary: No dysuria  Neurological: No AMS currently     prior hospital charts reviewed [V]  primary team notes reviewed [V]  other consultant notes reviewed [V]    PAST MEDICAL & SURGICAL HISTORY:  Atrial fibrillation  paroxysmal, on eliquis    Diabetes  Type 2    COPD (chronic obstructive pulmonary disease)    Adrenal insufficiency  Medrol daily for over 50 years    Aortic insufficiency  moderate AR on echo 5/3/2018    Pelvic fracture    Asthma    Tracheobronchomalacia  diagnosed 2015, s/p bronchial thermoplasty 2016 (Dr Zapien); recent bronchoscopy 6/5/2018 revealed no evidence of tracheobronchomalacia in trachea or bronchial tubes    Colorectal cancer  4/2018- last treatment , chemo and radiation    Rectal bleeding    Seizure  x 1 1/7/18    DVT (deep venous thrombosis)  15-20 years ago, took coumadin    TIA (transient ischemic attack)  multiple, last 5 years ago - presents as right-sided weakness    History of partial hysterectomy  30 years ago - fibroids    H/O total knee replacement, bilateral  5 years ago    History of sinus surgery  multiple sinus surgeries    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    H/O pelvic surgery  5 years ago - s/p fracture    History of tracheomalacia  2015 - attempted tracheal stenting ([facility])- course complicated by obstruction, respiratory failure, multiple CPR attempts -  stent discontinued; 10/20/2016 Tracheobronchoplasty (Prolene Mesh) performed at [facility] by Dr Zapien    S/P bronchoscopy  6/5/2018 - [facility] (Dr Zapien) no evidence of tracheobronchomalacia in trachea or bronchial tubes    Rectal bleeding  exam under anesthesia ([facility]) 2/2018    S/P TAVR (transcatheter aortic valve replacement)    S/P aortic valve replacement    SOCIAL HISTORY:  No sick contacts     FAMILY HISTORY:  Family history of asthma    Family history of breast cancer (Sibling)    Family history of diabetes mellitus type II    Allergies  penicillin (Anaphylaxis)  iodine (Short breath; Swelling)  tetanus toxoid (Short breath)  Avelox (Short breath; Pruritus)  aspirin (Short breath)  Dilaudid (Short breath)  codeine (Short breath)  Valium (Short breath)  shellfish (Anaphylaxis)  cefepime (Anaphylaxis)    ANTIMICROBIALS:    ANTIMICROBIALS (past 90 days):  MEDICATIONS  (STANDING):    OTHER MEDS:   MEDICATIONS  (STANDING):  acetaminophen     Tablet .. 650 every 6 hours PRN  acetylcysteine 10%  Inhalation 2 every 8 hours  albuterol/ipratropium for Nebulization 3 every 6 hours PRN  atorvastatin 20 at bedtime  budesonide 160 MICROgram(s)/formoterol 4.5 MICROgram(s) Inhaler 2 two times a day  clopidogrel Tablet 75 daily  dextrose 50% Injectable 25 once  dextrose 50% Injectable 12.5 once  dextrose Oral Gel 15 once PRN  famotidine    Tablet 20 daily  gabapentin 100 every 8 hours  glucagon  Injectable 1 once  influenza  Vaccine (HIGH DOSE) 0.7 once  insulin glargine Injectable (LANTUS) 16 at bedtime  insulin lispro (ADMELOG) corrective regimen sliding scale  at bedtime  insulin lispro (ADMELOG) corrective regimen sliding scale  three times a day before meals  insulin lispro Injectable (ADMELOG) 12 three times a day before meals  melatonin 3 at bedtime PRN  methylPREDNISolone 8 daily  mexiletine 200 every 8 hours  montelukast 10 daily  ondansetron Injectable 4 every 8 hours PRN  pantoprazole    Tablet 40 before breakfast  senna 2 at bedtime  tiotropium 2.5 MICROgram(s)/olodaterol 2.5 MICROgram(s) (STIOLTO) Inhaler 2 daily  warfarin 5 once    VITALS:  Vital Signs Last 24 Hrs  T(F): 97.7 (10-28-23 @ 05:56), Max: 98.7 (10-27-23 @ 17:13)    Vital Signs Last 24 Hrs  HR: 62 (10-28-23 @ 05:56) (62 - 86)  BP: 133/77 (10-28-23 @ 05:56) (119/60 - 167/75)  RR: 18 (10-28-23 @ 05:56)  SpO2: 97% (10-28-23 @ 05:56) (97% - 99%)  Wt(kg): --    EXAM:  General: Patient in no acute distress   HEENT: NCAT, EOMI  CV: S1+S2  Lungs: No respiratory distress, CTAB, room air   Abd: Soft, nontender, no guarding  Ext: No cyanosis  Neuro: Alert and oriented, calm   Skin: No rash     Labs:                        9.6    8.62  )-----------( 354      ( 28 Oct 2023 07:11 )             33.3     10-28    142  |  106  |  8   ----------------------------<  140<H>  3.8   |  26  |  0.54    Ca    8.8      28 Oct 2023 07:05    TPro  5.8<L>  /  Alb  3.3  /  TBili  0.1<L>  /  DBili  x   /  AST  18  /  ALT  16  /  AlkPhos  79  10-28    WBC Trend:  WBC Count: 8.62 (10-28-23 @ 07:11)  WBC Count: 10.58 (10-27-23 @ 19:28)    Auto Neutrophil #: 5.45 K/uL (10-28-23 @ 07:11)  Auto Neutrophil #: 8.33 K/uL (10-27-23 @ 19:28)  Auto Neutrophil #: 11.09 K/uL (09-14-23 @ 09:20)  Auto Neutrophil #: 9.28 K/uL (09-06-23 @ 09:32)  Auto Neutrophil #: 8.03 K/uL (09-05-23 @ 07:11)    Creatine Trend:  Creatinine: 0.54 (10-28)  Creatinine: 0.58 (10-27)    Liver Biochemical Testing Trend:  Alanine Aminotransferase (ALT/SGPT): 16 (10-28)  Alanine Aminotransferase (ALT/SGPT): 23 (10-27)  Alanine Aminotransferase (ALT/SGPT): 22 (10-01)  Alanine Aminotransferase (ALT/SGPT): 21 (09-30)  Alanine Aminotransferase (ALT/SGPT): 22 (09-29)  Aspartate Aminotransferase (AST/SGOT): 18 (10-28-23 @ 07:05)  Aspartate Aminotransferase (AST/SGOT): 48 (10-27-23 @ 19:28)  Aspartate Aminotransferase (AST/SGOT): 17 (10-01-23 @ 11:04)  Aspartate Aminotransferase (AST/SGOT): 16 (09-30-23 @ 11:32)  Aspartate Aminotransferase (AST/SGOT): 16 (09-29-23 @ 11:37)  Bilirubin Total: 0.1 (10-28)  Bilirubin Total: 0.3 (10-27)  Bilirubin Direct: <0.1 (10-01)  Bilirubin Total: 0.1 (10-01)  Bilirubin Direct: <0.1 (09-30)    Trend LDH  08-30-23 @ 07:06  2024<H>  08-29-23 @ 07:34  1896<H>  08-24-23 @ 12:04  1661<H>  08-16-23 @ 06:31  1382<H>  11-26-22 @ 08:13  214    Auto Eosinophil %: 0.6 % (10-28-23 @ 07:11)  Auto Eosinophil %: 0.0 % (10-27-23 @ 19:28)    Urinalysis Basic - ( 28 Oct 2023 07:05 )    Color: x / Appearance: x / SG: x / pH: x  Gluc: 140 mg/dL / Ketone: x  / Bili: x / Urobili: x   Blood: x / Protein: x / Nitrite: x   Leuk Esterase: x / RBC: x / WBC x   Sq Epi: x / Non Sq Epi: x / Bacteria: x    MICROBIOLOGY:    MRSA PCR Result.: Detected (09-15-23 @ 18:15)  MRSA PCR Result.: NotDetec (08-12-23 @ 16:15)    Culture - Blood (collected 28 Sep 2023 11:03)  Source: .Blood Blood-Peripheral  Final Report:    No growth at 5 days    Culture - Urine (collected 27 Sep 2023 17:58)  Source: Clean Catch Clean Catch (Midstream)  Final Report:    <10,000 CFU/mL Normal Urogenital Jessica    Culture - Sputum (collected 18 Sep 2023 00:30)  Source: .Sputum Sputum  Final Report:    Numerous Proteus mirabilis ESBL    Normal Respiratory Jessica present  Organism: Proteus mirabilis ESBL  Organism: Proteus mirabilis ESBL    Sensitivities:      -  Cefazolin: R >16      -  Cefepime: R >16      Method Type: GARRETT      -  Amikacin: S <=16      -  Amoxicillin/Clavulanic Acid: S <=8/4      -  Ampicillin: R >16 These ampicillin results predict results for amoxicillin      -  Ampicillin/Sulbactam: R <=4/2      -  Aztreonam: R >16      -  Ceftriaxone: R >32      -  Ciprofloxacin: R >2      -  Ertapenem: S <=0.5      -  Gentamicin: R >8      -  Levofloxacin: R >4      -  Meropenem: S <=1      -  Piperacillin/Tazobactam: R <=8      -  Tobramycin: R >8      -  Trimethoprim/Sulfamethoxazole: R >2/38    Culture - Acid Fast - Sputum w/Smear (collected 13 Sep 2023 16:29)  Source: .Sputum Sputum  Preliminary Report:    No acid-fast bacilli isolated at 5 weeks.    Culture - Sputum (collected 12 Sep 2023 16:41)  Source: .Sputum Sputum  Final Report:    Numerous Proteus mirabilis ESBL    Normal Respiratory Jessica absent  Organism: Proteus mirabilis ESBL  Organism: Proteus mirabilis ESBL    Sensitivities:      Method Type: GARRETT      -  Amikacin: S <=16      -  Amoxicillin/Clavulanic Acid: S <=8/4      -  Ampicillin: R >16 These ampicillin results predict results for amoxicillin      -  Ampicillin/Sulbactam: R 8/4      -  Aztreonam: R 16      -  Cefazolin: R >16      -  Cefepime: R 16      -  Ceftriaxone: R >32      -  Ciprofloxacin: R >2      -  Ertapenem: S <=0.5      -  Gentamicin: R >8      -  Levofloxacin: R >4      -  Meropenem: S <=1      -  Piperacillin/Tazobactam: R <=8      -  Tobramycin: R >8      -  Trimethoprim/Sulfamethoxazole: R >2/38    Culture - Sputum (collected 03 Sep 2023 18:59)  Source: .Sputum Sputum  Final Report:    Moderate Proteus mirabilis ESBL    Normal Respiratory Jessica present  Organism: Proteus mirabilis ESBL  Organism: Proteus mirabilis ESBL    Sensitivities:      Method Type: GARRETT      -  Amikacin: S <=16      -  Amoxicillin/Clavulanic Acid: S <=8/4      -  Ampicillin: R >16 These ampicillin results predict results for amoxicillin      -  Ampicillin/Sulbactam: R 16/8      -  Aztreonam: R >16      -  Cefazolin: R >16      -  Cefepime: R >16      -  Ceftriaxone: R >32      -  Ciprofloxacin: R >2      -  Ertapenem: S <=0.5      -  Gentamicin: R >8      -  Levofloxacin: R >4      -  Meropenem: S <=1      -  Piperacillin/Tazobactam: R <=8      -  Tobramycin: R >8      -  Trimethoprim/Sulfamethoxazole: R >2/38    Culture - Sputum (collected 26 Aug 2023 17:57)  Source: .Sputum Sputum  Final Report:    Normal Respiratory Jessica present    Culture - Sputum, Cystic Fibrosis (collected 18 Aug 2023 23:27)  Source: .Sputum Sputum  Final Report:    Rare Methicillin Resistant Staphylococcus aureus    Rare Normal Respiratory Jessica present  Organism: Methicillin resistant Staphylococcus aureus  Organism: Methicillin resistant Staphylococcus aureus    Sensitivities:      Method Type: GARRETT      -  Ampicillin/Sulbactam: R <=8/4      -  Cefazolin: R >16      -  Clindamycin: S <=0.25      -  Erythromycin: R >4      -  Gentamicin: S <=1 Should not be used as monotherapy      -  Linezolid: S 2      -  Oxacillin: R >2      -  Penicillin: R >8      -  Rifampin: S <=1 Should not be used as monotherapy      -  Tetracycline: S <=1      -  Trimethoprim/Sulfamethoxazole: R >2/38      -  Vancomycin: S 2    Culture - Sputum (collected 18 Aug 2023 10:43)  Source: .Sputum Sputum  Final Report:    Normal Respiratory Jessica present    Culture - Blood (collected 17 Aug 2023 09:35)  Source: .Blood Blood-Peripheral  Final Report:    No growth at 5 days    HIV-1/2 Combo Result: Nonreact (10-04-22 @ 00:31)    HIV-1 RNA Quantitative, Viral Load: NOT DET. copies/mL (10-04-22 @ 00:31)  HIV-1 Viral Load Result: NOT DET. (10-04-22 @ 00:31)    Troponin T, High Sensitivity Result: 25 (10-27)    Blood Gas Venous - Lactate: 2.0 (10-28 @ 07:11)  Lactate, Blood: 3.4 (10-27 @ 22:19)  Blood Gas Venous - Lactate: 2.4 (10-27 @ 19:00)    A1C with Estimated Average Glucose Result: 5.7 % (09-08-23 @ 06:39)  A1C with Estimated Average Glucose Result: 9.1 % (06-17-23 @ 10:27)  A1C with Estimated Average Glucose Result: 9.4 % (05-12-23 @ 07:40)    RADIOLOGY:  imaging below personally reviewed    < from: CT Head No Cont (10.27.23 @ 19:57) >  IMPRESSION:    No acute intracranial hemorrhage, brain edema, or mass effect.    < end of copied text >  < from: Xray Chest 1 View- PORTABLE-Urgent (10.27.23 @ 19:34) >  IMPRESSION:  Fine right basilar strand-like opacities compatible with subsegmental   atelectatic change. Clear remaining visualized visualized lungs. No   pleural effusions or pneumothorax.  Median sternotomy wires. Metallic mesh aortic valve stent.  Indistinct heart borders due to epicardial fat limits accurate assessment   of heart size however does not appear grossly enlarged.  Midline normal caliber trachea.  No acute bony abnormality.    --- End of Report ---    < end of copied text >

## 2023-10-28 NOTE — CONSULT NOTE ADULT - ASSESSMENT
73 F PMH asthma on steroids, bronchiectasis, allergic rhinitis, recurrent pneumonia, tracheomalacia s/p tracheoplasty, Diabetes, AF on Coumadin, colorectal cancer s/p colostomy, AVR 2022 and s/p TAVR - [facility] in September 2023, recently admitted September to Oct 2023 for bronchiectasis flare, Shingles, Covid-19 s/p Remdesivir, who now presents to the ED with c/o gait dysfunction.     Gait dysfunction for the last 2 days  Afebrile, cough at baseline, no hypoxia   Tested + for Covid-19 (she was + 9/27/23 as well)  CXR with atelectatic changes, otherwise clear lungs     Overall;  Covid-19 +  Suspect shedding of virus  Pt is asymptomatic     Suggest;  No Remdesivir indication  No antibiotics indicated at this point in time based on labs, imaging, clinical status  Pt also recently completed a course of abx (Doxycycline)   F/u Neurology recommendations in regards to gait dysfunction     All recommendations are tentative pending Attending Attestation.    Raymond Moon MD, PGY-5   ID Fellow  [employer] Teams Preferred  After 5pm/weekends call [phone]

## 2023-10-28 NOTE — PATIENT PROFILE ADULT - FALL HARM RISK - HARM RISK INTERVENTIONS
weakness
Assistance with ambulation/Assistance OOB with selected safe patient handling equipment/Communicate Risk of Fall with Harm to all staff/Discuss with provider need for PT consult/Monitor gait and stability/Provide patient with walking aids - walker, cane, crutches/Reinforce activity limits and safety measures with patient and family/Tailored Fall Risk Interventions/Visual Cue: Yellow wristband and red socks/Bed in lowest position, wheels locked, appropriate side rails in place/Call bell, personal items and telephone in reach/Instruct patient to call for assistance before getting out of bed or chair/Non-slip footwear when patient is out of bed/[place] to call system/Physically safe environment - no spills, clutter or unnecessary equipment/Purposeful Proactive Rounding/Room/bathroom lighting operational, light cord in reach

## 2023-10-28 NOTE — PHYSICAL THERAPY INITIAL EVALUATION ADULT - BALANCE TRAINING, PT EVAL
GOAL: Patient will demonstrate an increase in static/dynamic balance in sitting/standing where deficient by at least 1 grade to facilitate greater independence during functional mobility and ADL's in 1-2 weeks.

## 2023-10-28 NOTE — PROVIDER CONTACT NOTE (OTHER) - ACTION/TREATMENT ORDERED:
pt had hx of covid on 9/27/23, this admission tested positive again.  Consider shedding no symptoms.  Isolation discontinued.

## 2023-10-28 NOTE — PHYSICAL THERAPY INITIAL EVALUATION ADULT - BED MOBILITY TRAINING, PT EVAL
Patient let message, needs a refill of Trospium medication. She moved and has a new pharmacy now. She called back and said her new pharmacy is [facility] [address] in [place]. Their phone number is [phone].    GOAL: Patient will perform bed mobility independently in 1-2 weeks

## 2023-10-28 NOTE — PATIENT PROFILE ADULT - STAGE
EMERGENCY DEPARTMENT HISTORY AND PHYSICAL EXAM 
 
Date: 11/17/2018 Patient Name: Navarro Cortez History of Presenting Illness Chief Complaint Patient presents with  Gout  
  missy knees/rt wrist  
 
 
 
History Provided By: Patient and Patient's Wife Chief Complaint: gout attack Duration: 2 Days Timing:  Acute Location: right wrist, both knees Quality: Aching and Pressure Severity: Moderate Modifying Factors: ate seafood Associated Symptoms: denies any other associated signs or symptoms Additional History (Context): Navarro Cortez is a 60 y.o. male with gout who presents with c/o painful swelling/gouty attack to his right wrist, both knees x 2d. Denies trauma, fever. Hurts to move. PCP: Jeremy, MD Selam 
 
Current Facility-Administered Medications Medication Dose Route Frequency Provider Last Rate Last Dose  oxyCODONE-acetaminophen (PERCOCET) 5-325 mg per tablet 2 Tab  2 Tab Oral NOW Janice Mead PA      
 colchicine tablet 0.6 mg  0.6 mg Oral NOW Janice Mead PA      
 predniSONE (DELTASONE) tablet 60 mg  60 mg Oral NOW Janice Mead PA Current Outpatient Medications Medication Sig Dispense Refill  oxyCODONE-acetaminophen (PERCOCET) 5-325 mg per tablet Take 1 Tab by mouth every eight (8) hours as needed for Pain. Max Daily Amount: 3 Tabs. 21 Tab 0  
 oxyCODONE-acetaminophen (PERCOCET) 5-325 mg per tablet Take 1 tablet every 4-6 hours as needed for pain control. If you were instructed to try over the counter ibuprofen or tylenol, only take the percocet for pain not controlled with the over the counter medication. 12 Tab 0  
 colchicine 0.6 mg tablet Take 1 tablet by mouth Q1Hour for gout pain. NOT TO EXCEED 3 TABLETS IN 24 HOURS. 30 Tab 0  
 indomethacin (INDOCIN) 25 mg capsule Take 2 Caps by mouth three (3) times daily.  With food 60 Cap 0  
 methylPREDNISolone (MEDROL, CASS,) 4 mg tablet Per dose pack instructions 1 Dose Pack 0  
  ibuprofen (MOTRIN) 600 mg tablet Take 1 Tab by mouth every six (6) hours as needed for Pain. 20 Tab 0  
 HYDROcodone-acetaminophen (NORCO) 5-325 mg per tablet Take 1 Tab by mouth every four (4) hours as needed for Pain. Max Daily Amount: 6 Tabs. 20 Tab 0  
 lipase-protease-amylase (CREON 6000) 6,000-19,000 -30,000 unit capsule Take 1 Cap by mouth three (3) times daily (with meals). Indications: exocrine pancreatic insufficiency 90 Cap 0  
 omeprazole (PRILOSEC) 20 mg capsule Take 20 mg by mouth daily.  febuxostat (ULORIC) 40 mg tab tablet Take 1 Tab by mouth daily. 14 Tab 1  
 hydrALAZINE (APRESOLINE) 50 mg tablet Take 1 Tab by mouth two (2) times a day. 20 Tab 0  
 ondansetron hcl (ZOFRAN, AS HYDROCHLORIDE,) 4 mg tablet Take 1 Tab by mouth every eight (8) hours as needed for Nausea. 10 Tab 0  
 tamsulosin (FLOMAX) 0.4 mg capsule Take 1 Cap by mouth daily. Start this medication on Friday 12/15/17  Indications: Urolithiasis 6 Cap 0  
 glucose blood VI test strips (FREESTYLE TEST) strip by Does Not Apply route See Admin Instructions. Check twice a day 100 Strip 2  
 bisacodyl (DULCOLAX, BISACODYL,) 5 mg EC tablet Take 1 Tab by mouth daily as needed for Constipation. 30 Tab 0  
 NOVOLIN 70/30 100 unit/mL (70-30) injection INJECT 30 UNITS SC IN THE MORNING AND 20 UNITS SC IN THE EVENING BEFORE SUPPER 10 mL 4  
 isosorbide mononitrate ER (IMDUR) 60 mg CR tablet Take 1 Tab by mouth daily. 30 Tab 6  
 atorvastatin (LIPITOR) 40 mg tablet Take 1 Tab by mouth nightly. 30 Tab 6  furosemide (LASIX) 40 mg tablet Take 1 Tab by mouth daily. 30 Tab 6  fluticasone (FLONASE) 50 mcg/actuation nasal spray 2 Sprays by Both Nostrils route daily.  carvedilol (COREG) 6.25 mg tablet Take 1 Tab by mouth two (2) times daily (with meals). (Patient taking differently: Take 25 mg by mouth two (2) times daily (with meals). ) 60 Tab 6  
 nitroglycerin (NITROSTAT) 0.4 mg SL tablet 1 Tab by SubLINGual route as needed for Chest Pain. (Patient taking differently: 1 Tab by SubLINGual route as needed for Chest Pain. Pt to take 1 tab q 5 min up to three times, if symptom persist pt. to call 911.) 20 Tab 0  
 aspirin 81 mg chewable tablet Take 1 Tab by mouth daily. 30 Tab 0  B COMPLEX W-C NO.20/FOLIC ACID (B COMPLEX & C NO.20-FOLIC ACID PO) Take  by mouth.  budesonide-formoterol (SYMBICORT) 160-4.5 mcg/actuation HFA inhaler Take 2 Puffs by inhalation two (2) times a day. 1 Inhaler 0  
 calcitRIOL (ROCALTROL) 0.25 mcg capsule Take 1 Cap by mouth every Monday, Wednesday, Friday. 15 Cap 0  
 Lancets misc Check 3 times a day , needs according contour glucometer 1 Package 11  Blood-Glucose Meter monitoring kit Check twice daily. 1 kit 0 Past History Past Medical History: 
Past Medical History:  
Diagnosis Date  Alcohol abuse  Alcohol-induced chronic pancreatitis ([facility] [address] 9/7/2018  Arthritis  Atherosclerotic cardiovascular disease  CAD (coronary artery disease)   
 mild disease of coronaries (cor angio 2006),wife states he has 1 stent  Cardiac cath 01/26/2006 RCA mild. Otherwise patent coronaries. LVEDP 15.  EF 55-60%.  Cardiac echocardiogram 03/27/2009 EF 60%. No cardiac source of embolism. No significant valvular pathology.  Cardiac nuclear imaging test 12/06/2011 Small, mild inferior infarction or possibly artifact. No ischemia. EF 45%. No TID. No reg WMA.  Cardiovascular LLE venous duplex 08/14/2015 Left leg:  No DVT. Dilated lymph node in left groin.  Constipation  Diabetes mellitus type II   
 Gout  Hepatic steatosis  Hypercholesterolemia  Hypertension  Knee effusion, left  Left knee pain  Morbid obesity ([facility] [address]  Noncompliance  Obesity (BMI 30-39. 9)  S/P colonoscopy 3/8/05 Dr. Marlen Rutledge; normal; f/u 10 years  Stomach problems  TIA (transient ischemic attack) 3/09  Tobacco abuse Past Surgical History: 
Past Surgical History:  
Procedure Laterality Date  [address] [place] [address] UNLISTED Hernia repair in 1960's or 1970's.  HX ORTHOPAEDIC Bones spur removed from left & right foot .  HX UROLOGICAL  2015 [facility], Dr. Margaret Dawn, Cystoscopy, left retrograde, left double-J cath Family History: 
Family History Problem Relation Age of Onset  Diabetes Father  Heart Disease Mother  Diabetes Sister  Hypertension Sister  Arthritis-osteo Sister  Arthritis-osteo Brother  Diabetes Other  Diabetes Other [place]  Hypertension Other Uncle; Aunt Social History: 
Social History Tobacco Use  Smoking status: Current Every Day Smoker Packs/day: 0.25 Types: Cigarettes Last attempt to quit: 2016 Years since quittin.3  Smokeless tobacco: Never Used Substance Use Topics  Alcohol use: Yes Alcohol/week: 0.0 oz  
  Comment: 1 beers a day.  Drug use: No  
 
 
Allergies: Allergies Allergen Reactions  Shellfish Derived Other (comments) Causes Gout Review of Systems Review of Systems Constitutional: Negative for fever. Musculoskeletal: Positive for arthralgias, gait problem and joint swelling. All other systems reviewed and are negative. All Other Systems Negative Physical Exam  
 
Vitals:  
 18 0934 BP: 150/85 Pulse: (!) 105 Resp: 18 Temp: 97.5 °F (36.4 °C) Weight: 127 kg (280 lb) Height: 5' 4\" (1.626 m) Physical Exam  
Constitutional: Vital signs are normal. He appears well-developed and well-nourished. He is active. Non-toxic appearance. He does not appear ill. No distress. HENT:  
Head: Normocephalic and atraumatic. Neck: Normal range of motion. Neck supple. Carotid bruit is not present. No tracheal deviation present. No thyromegaly present. Cardiovascular: Normal rate, regular rhythm and normal heart sounds.  Exam reveals no gallop and no friction rub. No murmur heard. Pulmonary/Chest: Effort normal and breath sounds normal. No stridor. No respiratory distress. He has no wheezes. He has no rales. He exhibits no tenderness. Abdominal: Soft. He exhibits no distension and no mass. There is no tenderness. There is no rebound, no guarding and no CVA tenderness. Musculoskeletal: He exhibits edema and tenderness. Tender swelling, warmth to right dorsal wrist, bilateral knees. Radial,  DP and PT pulses palpable. Decreased ROM secondary to symptoms. Neurological: He is alert. Skin: Skin is warm, dry and intact. He is not diaphoretic. No pallor. Psychiatric: He has a normal mood and affect. His speech is normal and behavior is normal. Judgment and thought content normal.  
Nursing note and vitals reviewed. Diagnostic Study Results Labs - No results found for this or any previous visit (from the past 12 hour(s)). Radiologic Studies - No orders to display CT Results  (Last 48 hours) None CXR Results  (Last 48 hours) None Medical Decision Making I am the first provider for this patient. I reviewed the vital signs, available nursing notes, past medical history, past surgical history, family history and social history. Vital Signs-Reviewed the patient's vital signs. Records Reviewed: Nursing Notes Procedures: 
Procedures Provider Notes (Medical Decision Making): followed by ortho; treat painful gouty attack now. Refer to ortho. D/c home. MED RECONCILIATION: 
Current Facility-Administered Medications Medication Dose Route Frequency  oxyCODONE-acetaminophen (PERCOCET) 5-325 mg per tablet 2 Tab  2 Tab Oral NOW  colchicine tablet 0.6 mg  0.6 mg Oral NOW  predniSONE (DELTASONE) tablet 60 mg  60 mg Oral NOW Current Outpatient Medications Medication Sig  
 oxyCODONE-acetaminophen (PERCOCET) 5-325 mg per tablet Take 1 Tab by mouth every eight (8) hours as needed for Pain. Max Daily Amount: 3 Tabs.  oxyCODONE-acetaminophen (PERCOCET) 5-325 mg per tablet Take 1 tablet every 4-6 hours as needed for pain control. If you were instructed to try over the counter ibuprofen or tylenol, only take the percocet for pain not controlled with the over the counter medication.  colchicine 0.6 mg tablet Take 1 tablet by mouth Q1Hour for gout pain. NOT TO EXCEED 3 TABLETS IN 24 HOURS.  indomethacin (INDOCIN) 25 mg capsule Take 2 Caps by mouth three (3) times daily. With food  methylPREDNISolone (MEDROL, CASS,) 4 mg tablet Per dose pack instructions  ibuprofen (MOTRIN) 600 mg tablet Take 1 Tab by mouth every six (6) hours as needed for Pain.  HYDROcodone-acetaminophen (NORCO) 5-325 mg per tablet Take 1 Tab by mouth every four (4) hours as needed for Pain. Max Daily Amount: 6 Tabs.  lipase-protease-amylase (CREON 6000) 6,000-19,000 -30,000 unit capsule Take 1 Cap by mouth three (3) times daily (with meals). Indications: exocrine pancreatic insufficiency  omeprazole (PRILOSEC) 20 mg capsule Take 20 mg by mouth daily.  febuxostat (ULORIC) 40 mg tab tablet Take 1 Tab by mouth daily.  hydrALAZINE (APRESOLINE) 50 mg tablet Take 1 Tab by mouth two (2) times a day.  ondansetron hcl (ZOFRAN, AS HYDROCHLORIDE,) 4 mg tablet Take 1 Tab by mouth every eight (8) hours as needed for Nausea.  tamsulosin (FLOMAX) 0.4 mg capsule Take 1 Cap by mouth daily. Start this medication on Friday 12/15/17  Indications: Urolithiasis  glucose blood VI test strips (FREESTYLE TEST) strip by Does Not Apply route See Admin Instructions. Check twice a day  bisacodyl (DULCOLAX, BISACODYL,) 5 mg EC tablet Take 1 Tab by mouth daily as needed for Constipation.   
 NOVOLIN 70/30 100 unit/mL (70-30) injection INJECT 30 UNITS SC IN THE MORNING AND 20 UNITS SC IN THE EVENING BEFORE SUPPER  
  isosorbide mononitrate ER (IMDUR) 60 mg CR tablet Take 1 Tab by mouth daily.  atorvastatin (LIPITOR) 40 mg tablet Take 1 Tab by mouth nightly.  furosemide (LASIX) 40 mg tablet Take 1 Tab by mouth daily.  fluticasone (FLONASE) 50 mcg/actuation nasal spray 2 Sprays by Both Nostrils route daily.  carvedilol (COREG) 6.25 mg tablet Take 1 Tab by mouth two (2) times daily (with meals). (Patient taking differently: Take 25 mg by mouth two (2) times daily (with meals). )  nitroglycerin (NITROSTAT) 0.4 mg SL tablet 1 Tab by SubLINGual route as needed for Chest Pain. (Patient taking differently: 1 Tab by SubLINGual route as needed for Chest Pain. Pt to take 1 tab q 5 min up to three times, if symptom persist pt. to call 911.)  aspirin 81 mg chewable tablet Take 1 Tab by mouth daily.  B COMPLEX W-C NO.20/FOLIC ACID (B COMPLEX & C NO.20-FOLIC ACID PO) Take  by mouth.  budesonide-formoterol (SYMBICORT) 160-4.5 mcg/actuation HFA inhaler Take 2 Puffs by inhalation two (2) times a day.  calcitRIOL (ROCALTROL) 0.25 mcg capsule Take 1 Cap by mouth every Monday, Wednesday, Friday.  Lancets misc Check 3 times a day , needs according contour glucometer  Blood-Glucose Meter monitoring kit Check twice daily. Disposition: 
home DISCHARGE NOTE:  
10:22 AM 
 
Pt has been reexamined. Patient has no new complaints, changes, or physical findings. Care plan outlined and precautions discussed. Results of exam were reviewed with the patient. All medications were reviewed with the patient; will d/c home with steroid, indomethacin, colchicine, percocet. All of pt's questions and concerns were addressed. Patient was instructed and agrees to follow up with ortho, as well as to return to the ED upon further deterioration. Patient is ready to go home. Follow-up Information Follow up With Specialties Details Why Contact Info  Geno Mahoney MD Orthopedic Surgery   [address] [place] [address] 
[address] 
 [facility] [place] [address] 
[phone] Current Discharge Medication List  
  
START taking these medications Details  
colchicine 0.6 mg tablet Take 1 tablet by mouth Q1Hour for gout pain. NOT TO EXCEED 3 TABLETS IN 24 HOURS. Qty: 30 Tab, Refills: 0  
  
indomethacin (INDOCIN) 25 mg capsule Take 2 Caps by mouth three (3) times daily. With food 
Qty: 60 Cap, Refills: 0  
  
methylPREDNISolone (MEDROL, CASS,) 4 mg tablet Per dose pack instructions Qty: 1 Dose Pack, Refills: 0 CONTINUE these medications which have CHANGED Details  
!! oxyCODONE-acetaminophen (PERCOCET) 5-325 mg per tablet Take 1 Tab by mouth every eight (8) hours as needed for Pain. Max Daily Amount: 3 Tabs. Qty: 21 Tab, Refills: 0 Associated Diagnoses: Right wrist sprain, initial encounter; Pain; Contusion of right wrist, initial encounter; Decreased range of motion  
  
!! oxyCODONE-acetaminophen (PERCOCET) 5-325 mg per tablet Take 1 tablet every 4-6 hours as needed for pain control. If you were instructed to try over the counter ibuprofen or tylenol, only take the percocet for pain not controlled with the over the counter medication. Qty: 12 Tab, Refills: 0 Associated Diagnoses: Acute idiopathic gout of right wrist; Acute idiopathic gout of right knee; Acute idiopathic gout of left knee  
  
 !! - Potential duplicate medications found. Please discuss with provider. Diagnosis Clinical Impression: 1. Acute idiopathic gout of right wrist   
2. Acute idiopathic gout of right knee 3. Acute idiopathic gout of left knee 4. Right wrist sprain, initial encounter 5. Pain 6. Contusion of right wrist, initial encounter 7. Decreased range of motion stage II suspected deep tissue injury

## 2023-10-28 NOTE — PHYSICAL THERAPY INITIAL EVALUATION ADULT - PERTINENT HX OF CURRENT PROBLEM, REHAB EVAL
Pt is a 72 year old Female w/ asthma on chronic steroids, bronchiectasis,  recurrent PNA,  tracheomalacia s/p tracheoplasty, DM  paroxysmal A-fib on coumadin, colorectal cancer s/p colectomy and ostomy ,  s/p AVR (2022) and  TAVR- [facility] 9 on 9/6 , recent shingles , as well as covid infection; Presented to [facility] 10/27/23 with p/w AMS and unsteady gait x few days.  10/28/23 I & D consult: Covid-19 +, in light of recent covid 19+ Suspect likely shedding of virus. Pt is asymptomatic.  No treatment recommended. Pt is a 72 year old Female w/ asthma on chronic steroids, bronchiectasis,  recurrent PNA,  tracheomalacia s/p tracheoplasty, DM  paroxysmal A-fib on coumadin, colorectal cancer s/p colectomy and ostomy ,  s/p AVR (2022) and  TAVR- [facility] 9 on 9/6 , recent shingles , as well as covid infection; Presented to [facility] 10/27/23 with p/w AMS and unsteady gait x few days.  10/28/23 I & D consult: Covid-19 +, in light of recent covid 19+ Suspect likely shedding of virus. Pt is asymptomatic.  No treatment recommended.    CT Head 10/27/23: No acute intracranial hemorrhage, midline shift, or mass effect. Age-related involutional changes with mild bihemispheric chronic microvascular changes. Intracranial atherosclerosis. Chest X-ray 10/27/23:  Fine right basilar strand-like opacities compatible with subsegmental   atelectatic change. Clear remaining visualized visualized lungs. No pleural effusions or pneumothorax. Median sternotomy wires. Metallic mesh aortic valve stent. Indistinct heart borders due to epicardial fat limits accurate assessment of heart size however does not appear grossly enlarged. Midline normal caliber trachea. No acute bony abnormality. Pt is a 72 year old Female w/ asthma on chronic steroids, bronchiectasis,  recurrent PNA,  tracheomalacia s/p tracheoplasty, DM  paroxysmal A-fib on coumadin, colorectal cancer s/p colectomy and ostomy ,  s/p AVR (2022) and  TAVR- [facility] 9 on 9/6 , recent shingles , as well as covid infection; Presented to [facility] 10/27/23 with p/w AMS and unsteady gait x few days.  10/28/23 I & D consult: Covid-19 +, in light of recent covid 19+ Suspect likely shedding of virus. Pt is asymptomatic.  No treatment recommended.    CT Head 10/27/23: No acute intracranial hemorrhage, midline shift, or mass effect. Age-related involutional changes with mild bihemispheric chronic microvascular changes. Intracranial atherosclerosis. Chest X-ray 10/27/23:  Fine right basilar strand-like opacities compatible with subsegmental   atelectatic change. Clear remaining visualized  lungs. No pleural effusions or pneumothorax. Median sternotomy wires. Metallic mesh aortic valve stent. Indistinct heart borders due to epicardial fat limits accurate assessment of heart size however does not appear grossly enlarged. Midline normal caliber trachea. No acute bony abnormality.

## 2023-10-28 NOTE — PROGRESS NOTE ADULT - SUBJECTIVE AND OBJECTIVE BOX
72 female with PMH of asthma on chronic steroids, bronchiectasis, allergic rhinitis,  recurrent PNA,  tracheomalacia s/p tracheoplasty, ESBL proteus infections (sputum),  diabetes, paroxysmal A-fib on coumadin, colorectal cancer many years ago status post colostomy, recent hospitalization at an outside hospital  for acute respiratory failure with hypoxia secondary to asthma/COPD exacerbation and bronchopneumonia 6/5/2023 - 6/16/2023), and AVR 2022 ( Dr. Cabrera)   s/p  TAVR- [facility] 9 on 9/6 by Dr. Gonsalves and Dr. Leahy) , recently admitted (9/12- 10/01/23) for SOB, treated for bronchectasis flare, during course treated fo shingles , as well as covid infection; patient now presents for altered mental state for the past two days.   For the past two weeks, patient reports feeling unsteady on her feet and difficulty ambulating , needing assitance to walk. She reports no numbness or tingling and focal weakness. During this time she reports an ongoing cough with thick yellow-white sputum . She contacted her pulmonologist and was treated with a ten day course of doxycycline which was recently completed. On day prior to admission. patient became confused and "couldn't remember things" and became disoriented. She reports no fever or chills, cough remains unchanged. She has no chest pain and no shorntess of breath. No sick contacts.   (27 Oct 2023 23:12)    10-28-23 @ 10:32  PAST MEDICAL & SURGICAL HISTORY:  Atrial fibrillation  paroxysmal, on eliquis      Diabetes  Type 2      COPD (chronic obstructive pulmonary disease)      Adrenal insufficiency  Medrol daily for over 50 years      Aortic insufficiency  moderate AR on echo 5/3/2018      Pelvic fracture      Asthma      Tracheobronchomalacia  diagnosed 2015, s/p bronchial thermoplasty 2016 (Dr Zapien); recent bronchoscopy 6/5/2018 revealed no evidence of tracheobronchomalacia in trachea or bronchial tubes      Colorectal cancer  4/2018- last treatment , chemo and radiation      Rectal bleeding      Seizure  x 1 1/7/18      DVT (deep venous thrombosis)  15-20 years ago, took coumadin      TIA (transient ischemic attack)  multiple, last 5 years ago - presents as right-sided weakness      History of partial hysterectomy  30 years ago - fibroids      H/O total knee replacement, bilateral  5 years ago      History of sinus surgery  multiple sinus surgeries      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      H/O pelvic surgery  5 years ago - s/p fracture      History of tracheomalacia  2015 - attempted tracheal stenting ([facility])- course complicated by obstruction, respiratory failure, multiple CPR attempts -  stent discontinued; 10/20/2016 Tracheobronchoplasty (Prolene Mesh) performed at [facility] by Dr Zapien      S/P bronchoscopy  6/5/2018 - [place] [facility] (Dr Zapien) no evidence of tracheobronchomalacia in trachea or bronchial tubes      Rectal bleeding  exam under anesthesia ([facility]) 2/2018      S/P TAVR (transcatheter aortic valve replacement)      S/P aortic valve replacement          Review of Systems:   CONSTITUTIONAL: No fever, weight loss, or fatigue  EYES: No eye pain, visual disturbances, or discharge  ENMT:  No difficulty hearing, tinnitus, vertigo; No sinus or throat pain  NECK: No pain or stiffness  BREASTS: No pain, masses, or nipple discharge  RESPIRATORY: productive cough  CARDIOVASCULAR: No chest pain, palpitations, dizziness, or leg swelling  GASTROINTESTINAL: No abdominal or epigastric pain. No nausea, vomiting, or hematemesis; No diarrhea or constipation. No melena or hematochezia.  GENITOURINARY: No dysuria, frequency, hematuria, or incontinence  NEUROLOGICAL: as above  SKIN: No itching, burning, rashes, or lesions   LYMPH NODES: No enlarged glands  ENDOCRINE: No heat or cold intolerance; No hair loss  MUSCULOSKELETAL: No joint pain or swelling; No muscle, back, or extremity pain  PSYCHIATRIC: No depression, anxiety, mood swings, or difficulty sleeping  HEME/LYMPH: No easy bruising, or bleeding gums  ALLERY AND IMMUNOLOGIC: No hives or eczema    Allergies    penicillin (Anaphylaxis)  iodine (Short breath; Swelling)  tetanus toxoid (Short breath)  Avelox (Short breath; Pruritus)  aspirin (Short breath)  Dilaudid (Short breath)  codeine (Short breath)  Valium (Short breath)  shellfish (Anaphylaxis)  cefepime (Anaphylaxis)    Intolerances        Social History:     FAMILY HISTORY:  Family history of asthma    Family history of breast cancer (Sibling)    Family history of diabetes mellitus type II        MEDICATIONS  (STANDING):  acetylcysteine 10%  Inhalation 2 milliLiter(s) Inhalation every 8 hours  ascorbic acid 500 milliGRAM(s) Oral daily  atorvastatin 20 milliGRAM(s) Oral at bedtime  budesonide 160 MICROgram(s)/formoterol 4.5 MICROgram(s) Inhaler 2 Puff(s) Inhalation two times a day  clopidogrel Tablet 75 milliGRAM(s) Oral daily  dextrose 5%. 1000 milliLiter(s) (100 mL/Hr) IV Continuous <Continuous>  dextrose 5%. 1000 milliLiter(s) (50 mL/Hr) IV Continuous <Continuous>  dextrose 50% Injectable 12.5 Gram(s) IV Push once  dextrose 50% Injectable 25 Gram(s) IV Push once  famotidine    Tablet 20 milliGRAM(s) Oral daily  gabapentin 100 milliGRAM(s) Oral every 8 hours  glucagon  Injectable 1 milliGRAM(s) IntraMuscular once  influenza  Vaccine (HIGH DOSE) 0.7 milliLiter(s) IntraMuscular once  insulin glargine Injectable (LANTUS) 16 Unit(s) SubCutaneous at bedtime  insulin lispro (ADMELOG) corrective regimen sliding scale   SubCutaneous three times a day before meals  insulin lispro (ADMELOG) corrective regimen sliding scale   SubCutaneous at bedtime  insulin lispro Injectable (ADMELOG) 12 Unit(s) SubCutaneous three times a day before meals  methylPREDNISolone 8 milliGRAM(s) Oral daily  mexiletine 200 milliGRAM(s) Oral every 8 hours  montelukast 10 milliGRAM(s) Oral daily  multivitamin 1 Tablet(s) Oral daily  pantoprazole    Tablet 40 milliGRAM(s) Oral before breakfast  senna 2 Tablet(s) Oral at bedtime  tiotropium 2.5 MICROgram(s)/olodaterol 2.5 MICROgram(s) (STIOLTO) Inhaler 2 Puff(s) Inhalation daily  warfarin 5 milliGRAM(s) Oral once    MEDICATIONS  (PRN):  acetaminophen     Tablet .. 650 milliGRAM(s) Oral every 6 hours PRN Temp greater or equal to 38C (100.4F), Mild Pain (1 - 3)  albuterol/ipratropium for Nebulization 3 milliLiter(s) Nebulizer every 6 hours PRN Shortness of Breath and/or Wheezing  dextrose Oral Gel 15 Gram(s) Oral once PRN Blood Glucose LESS THAN 70 milliGRAM(s)/deciliter  melatonin 3 milliGRAM(s) Oral at bedtime PRN Insomnia  ondansetron Injectable 4 milliGRAM(s) IV Push every 8 hours PRN Nausea and/or Vomiting      Vital Signs Last 24 Hrs  T(C): 36.5 (28 Oct 2023 05:56), Max: 37.1 (27 Oct 2023 17:13)  T(F): 97.7 (28 Oct 2023 05:56), Max: 98.7 (27 Oct 2023 17:13)  HR: 62 (28 Oct 2023 05:56) (62 - 86)  BP: 133/77 (28 Oct 2023 05:56) (119/60 - 167/75)  BP(mean): --  RR: 18 (28 Oct 2023 05:56) (15 - 20)  SpO2: 97% (28 Oct 2023 05:56) (97% - 99%)    Parameters below as of 28 Oct 2023 05:56  Patient On (Oxygen Delivery Method): room air      CAPILLARY BLOOD GLUCOSE      POCT Blood Glucose.: 120 mg/dL (28 Oct 2023 08:32)  POCT Blood Glucose.: 277 mg/dL (28 Oct 2023 00:12)  POCT Blood Glucose.: 387 mg/dL (27 Oct 2023 18:35)  POCT Blood Glucose.: 402 mg/dL (27 Oct 2023 18:34)    I&O's Summary    27 Oct 2023 07:01  -  28 Oct 2023 07:00  --------------------------------------------------------  IN: 0 mL / OUT: 800 mL / NET: -800 mL        PHYSICAL EXAM:  GENERAL: NAD, well-developed  HEAD:  Atraumatic, Normocephalic  EYES: EOMI, PERRLA, conjunctiva and sclera clear  NECK: Supple, No JVD  CHEST/LUNG: Clear to auscultation bilaterally; No wheeze  HEART: Regular rate and rhythm; No murmurs, rubs, or gallops  ABDOMEN: Soft, Nontender, Nondistended; Bowel sounds present  EXTREMITIES:  2+ Peripheral Pulses, No clubbing, cyanosis, or edema  PSYCH: AAOx3  NEUROLOGY: non-focal  SKIN: No rashes or lesions    LABS:                        9.6    8.62  )-----------( 354      ( 28 Oct 2023 07:11 )             33.3     10-28    142  |  106  |  8   ----------------------------<  140<H>  3.8   |  26  |  0.54    Ca    8.8      28 Oct 2023 07:05    TPro  5.8<L>  /  Alb  3.3  /  TBili  0.1<L>  /  DBili  x   /  AST  18  /  ALT  16  /  AlkPhos  79  10-28    PT/INR - ( 28 Oct 2023 07:11 )   PT: 39.9 sec;   INR: 3.77 ratio         PTT - ( 28 Oct 2023 07:11 )  PTT:42.8 sec      Urinalysis Basic - ( 28 Oct 2023 07:05 )    Color: x / Appearance: x / SG: x / pH: x  Gluc: 140 mg/dL / Ketone: x  / Bili: x / Urobili: x   Blood: x / Protein: x / Nitrite: x   Leuk Esterase: x / RBC: x / WBC x   Sq Epi: x / Non Sq Epi: x / Bacteria: x        RADIOLOGY & ADDITIONAL TESTS:    Imaging Personally Reviewed:    Consultant(s) Notes Reviewed:      Care Discussed with Consultants/Other Providers:

## 2023-10-29 LAB
ANION GAP SERPL CALC-SCNC: 11 MMOL/L — SIGNIFICANT CHANGE UP (ref 5–17)
ANION GAP SERPL CALC-SCNC: 11 MMOL/L — SIGNIFICANT CHANGE UP (ref 5–17)
APTT BLD: 42.1 SEC — HIGH (ref 24.5–35.6)
APTT BLD: 42.1 SEC — HIGH (ref 24.5–35.6)
BUN SERPL-MCNC: 10 MG/DL — SIGNIFICANT CHANGE UP (ref 7–23)
BUN SERPL-MCNC: 10 MG/DL — SIGNIFICANT CHANGE UP (ref 7–23)
CALCIUM SERPL-MCNC: 8.7 MG/DL — SIGNIFICANT CHANGE UP (ref 8.4–10.5)
CALCIUM SERPL-MCNC: 8.7 MG/DL — SIGNIFICANT CHANGE UP (ref 8.4–10.5)
CHLORIDE SERPL-SCNC: 101 MMOL/L — SIGNIFICANT CHANGE UP (ref 96–108)
CHLORIDE SERPL-SCNC: 101 MMOL/L — SIGNIFICANT CHANGE UP (ref 96–108)
CO2 SERPL-SCNC: 27 MMOL/L — SIGNIFICANT CHANGE UP (ref 22–31)
CO2 SERPL-SCNC: 27 MMOL/L — SIGNIFICANT CHANGE UP (ref 22–31)
CREAT SERPL-MCNC: 0.67 MG/DL — SIGNIFICANT CHANGE UP (ref 0.5–1.3)
CREAT SERPL-MCNC: 0.67 MG/DL — SIGNIFICANT CHANGE UP (ref 0.5–1.3)
EGFR: 91 ML/MIN/1.73M2 — SIGNIFICANT CHANGE UP
EGFR: 91 ML/MIN/1.73M2 — SIGNIFICANT CHANGE UP
GLUCOSE BLDC GLUCOMTR-MCNC: 286 MG/DL — HIGH (ref 70–99)
GLUCOSE BLDC GLUCOMTR-MCNC: 286 MG/DL — HIGH (ref 70–99)
GLUCOSE BLDC GLUCOMTR-MCNC: 290 MG/DL — HIGH (ref 70–99)
GLUCOSE BLDC GLUCOMTR-MCNC: 290 MG/DL — HIGH (ref 70–99)
GLUCOSE BLDC GLUCOMTR-MCNC: 291 MG/DL — HIGH (ref 70–99)
GLUCOSE BLDC GLUCOMTR-MCNC: 291 MG/DL — HIGH (ref 70–99)
GLUCOSE BLDC GLUCOMTR-MCNC: 351 MG/DL — HIGH (ref 70–99)
GLUCOSE BLDC GLUCOMTR-MCNC: 351 MG/DL — HIGH (ref 70–99)
GLUCOSE SERPL-MCNC: 298 MG/DL — HIGH (ref 70–99)
GLUCOSE SERPL-MCNC: 298 MG/DL — HIGH (ref 70–99)
HCT VFR BLD CALC: 33.9 % — LOW (ref 34.5–45)
HCT VFR BLD CALC: 33.9 % — LOW (ref 34.5–45)
HGB BLD-MCNC: 9.9 G/DL — LOW (ref 11.5–15.5)
HGB BLD-MCNC: 9.9 G/DL — LOW (ref 11.5–15.5)
INR BLD: 3.09 RATIO — HIGH (ref 0.85–1.18)
INR BLD: 3.09 RATIO — HIGH (ref 0.85–1.18)
MAGNESIUM SERPL-MCNC: 2 MG/DL — SIGNIFICANT CHANGE UP (ref 1.6–2.6)
MAGNESIUM SERPL-MCNC: 2 MG/DL — SIGNIFICANT CHANGE UP (ref 1.6–2.6)
MCHC RBC-ENTMCNC: 21 PG — LOW (ref 27–34)
MCHC RBC-ENTMCNC: 21 PG — LOW (ref 27–34)
MCHC RBC-ENTMCNC: 29.2 GM/DL — LOW (ref 32–36)
MCHC RBC-ENTMCNC: 29.2 GM/DL — LOW (ref 32–36)
MCV RBC AUTO: 71.8 FL — LOW (ref 80–100)
MCV RBC AUTO: 71.8 FL — LOW (ref 80–100)
NRBC # BLD: 0 /100 WBCS — SIGNIFICANT CHANGE UP (ref 0–0)
NRBC # BLD: 0 /100 WBCS — SIGNIFICANT CHANGE UP (ref 0–0)
PHOSPHATE SERPL-MCNC: 2.7 MG/DL — SIGNIFICANT CHANGE UP (ref 2.5–4.5)
PHOSPHATE SERPL-MCNC: 2.7 MG/DL — SIGNIFICANT CHANGE UP (ref 2.5–4.5)
PLATELET # BLD AUTO: 360 K/UL — SIGNIFICANT CHANGE UP (ref 150–400)
PLATELET # BLD AUTO: 360 K/UL — SIGNIFICANT CHANGE UP (ref 150–400)
POTASSIUM SERPL-MCNC: 4.1 MMOL/L — SIGNIFICANT CHANGE UP (ref 3.5–5.3)
POTASSIUM SERPL-MCNC: 4.1 MMOL/L — SIGNIFICANT CHANGE UP (ref 3.5–5.3)
POTASSIUM SERPL-SCNC: 4.1 MMOL/L — SIGNIFICANT CHANGE UP (ref 3.5–5.3)
POTASSIUM SERPL-SCNC: 4.1 MMOL/L — SIGNIFICANT CHANGE UP (ref 3.5–5.3)
PROTHROM AB SERPL-ACNC: 31.4 SEC — HIGH (ref 9.5–13)
PROTHROM AB SERPL-ACNC: 31.4 SEC — HIGH (ref 9.5–13)
RBC # BLD: 4.72 M/UL — SIGNIFICANT CHANGE UP (ref 3.8–5.2)
RBC # BLD: 4.72 M/UL — SIGNIFICANT CHANGE UP (ref 3.8–5.2)
RBC # FLD: 21.5 % — HIGH (ref 10.3–14.5)
RBC # FLD: 21.5 % — HIGH (ref 10.3–14.5)
SODIUM SERPL-SCNC: 139 MMOL/L — SIGNIFICANT CHANGE UP (ref 135–145)
SODIUM SERPL-SCNC: 139 MMOL/L — SIGNIFICANT CHANGE UP (ref 135–145)
WBC # BLD: 9.12 K/UL — SIGNIFICANT CHANGE UP (ref 3.8–10.5)
WBC # BLD: 9.12 K/UL — SIGNIFICANT CHANGE UP (ref 3.8–10.5)
WBC # FLD AUTO: 9.12 K/UL — SIGNIFICANT CHANGE UP (ref 3.8–10.5)
WBC # FLD AUTO: 9.12 K/UL — SIGNIFICANT CHANGE UP (ref 3.8–10.5)

## 2023-10-29 PROCEDURE — 99232 SBSQ HOSP IP/OBS MODERATE 35: CPT | Mod: GC

## 2023-10-29 PROCEDURE — 99232 SBSQ HOSP IP/OBS MODERATE 35: CPT

## 2023-10-29 PROCEDURE — 70551 MRI BRAIN STEM W/O DYE: CPT | Mod: 26

## 2023-10-29 RX ORDER — WARFARIN SODIUM 2.5 MG/1
3 TABLET ORAL AT BEDTIME
Refills: 0 | Status: COMPLETED | OUTPATIENT
Start: 2023-10-29 | End: 2023-10-29

## 2023-10-29 RX ORDER — INSULIN GLARGINE 100 [IU]/ML
20 INJECTION, SOLUTION SUBCUTANEOUS AT BEDTIME
Refills: 0 | Status: DISCONTINUED | OUTPATIENT
Start: 2023-10-29 | End: 2023-10-31

## 2023-10-29 RX ORDER — INSULIN LISPRO 100/ML
14 VIAL (ML) SUBCUTANEOUS
Refills: 0 | Status: DISCONTINUED | OUTPATIENT
Start: 2023-10-29 | End: 2023-10-31

## 2023-10-29 RX ADMIN — FAMOTIDINE 20 MILLIGRAM(S): 10 INJECTION INTRAVENOUS at 11:08

## 2023-10-29 RX ADMIN — MEXILETINE HYDROCHLORIDE 200 MILLIGRAM(S): 150 CAPSULE ORAL at 01:26

## 2023-10-29 RX ADMIN — Medication 3: at 12:36

## 2023-10-29 RX ADMIN — MEXILETINE HYDROCHLORIDE 200 MILLIGRAM(S): 150 CAPSULE ORAL at 17:32

## 2023-10-29 RX ADMIN — Medication 3: at 17:32

## 2023-10-29 RX ADMIN — Medication 32 MILLIGRAM(S): at 05:41

## 2023-10-29 RX ADMIN — Medication 600 MILLIGRAM(S): at 05:14

## 2023-10-29 RX ADMIN — Medication 600 MILLIGRAM(S): at 17:32

## 2023-10-29 RX ADMIN — GABAPENTIN 100 MILLIGRAM(S): 400 CAPSULE ORAL at 01:26

## 2023-10-29 RX ADMIN — INSULIN GLARGINE 20 UNIT(S): 100 INJECTION, SOLUTION SUBCUTANEOUS at 21:54

## 2023-10-29 RX ADMIN — GABAPENTIN 100 MILLIGRAM(S): 400 CAPSULE ORAL at 17:31

## 2023-10-29 RX ADMIN — SODIUM CHLORIDE 4 MILLILITER(S): 9 INJECTION INTRAMUSCULAR; INTRAVENOUS; SUBCUTANEOUS at 23:03

## 2023-10-29 RX ADMIN — Medication 3 MILLILITER(S): at 17:32

## 2023-10-29 RX ADMIN — WARFARIN SODIUM 3 MILLIGRAM(S): 2.5 TABLET ORAL at 21:52

## 2023-10-29 RX ADMIN — BUDESONIDE AND FORMOTEROL FUMARATE DIHYDRATE 2 PUFF(S): 160; 4.5 AEROSOL RESPIRATORY (INHALATION) at 05:14

## 2023-10-29 RX ADMIN — Medication 14 UNIT(S): at 17:33

## 2023-10-29 RX ADMIN — Medication 3 MILLILITER(S): at 05:13

## 2023-10-29 RX ADMIN — MONTELUKAST 10 MILLIGRAM(S): 4 TABLET, CHEWABLE ORAL at 11:06

## 2023-10-29 RX ADMIN — Medication 500 MILLIGRAM(S): at 11:05

## 2023-10-29 RX ADMIN — Medication 2 MILLILITER(S): at 21:13

## 2023-10-29 RX ADMIN — MEXILETINE HYDROCHLORIDE 200 MILLIGRAM(S): 150 CAPSULE ORAL at 11:04

## 2023-10-29 RX ADMIN — SODIUM CHLORIDE 4 MILLILITER(S): 9 INJECTION INTRAMUSCULAR; INTRAVENOUS; SUBCUTANEOUS at 05:15

## 2023-10-29 RX ADMIN — PANTOPRAZOLE SODIUM 40 MILLIGRAM(S): 20 TABLET, DELAYED RELEASE ORAL at 05:13

## 2023-10-29 RX ADMIN — CLOPIDOGREL BISULFATE 75 MILLIGRAM(S): 75 TABLET, FILM COATED ORAL at 11:06

## 2023-10-29 RX ADMIN — BUDESONIDE AND FORMOTEROL FUMARATE DIHYDRATE 2 PUFF(S): 160; 4.5 AEROSOL RESPIRATORY (INHALATION) at 17:31

## 2023-10-29 RX ADMIN — Medication 2 MILLILITER(S): at 05:13

## 2023-10-29 RX ADMIN — GABAPENTIN 100 MILLIGRAM(S): 400 CAPSULE ORAL at 11:05

## 2023-10-29 RX ADMIN — Medication 3 MILLILITER(S): at 11:06

## 2023-10-29 RX ADMIN — Medication 14 UNIT(S): at 12:36

## 2023-10-29 RX ADMIN — Medication 5: at 08:48

## 2023-10-29 RX ADMIN — SENNA PLUS 2 TABLET(S): 8.6 TABLET ORAL at 21:13

## 2023-10-29 RX ADMIN — SODIUM CHLORIDE 4 MILLILITER(S): 9 INJECTION INTRAMUSCULAR; INTRAVENOUS; SUBCUTANEOUS at 11:07

## 2023-10-29 RX ADMIN — Medication 1 TABLET(S): at 11:05

## 2023-10-29 RX ADMIN — Medication 3 MILLILITER(S): at 23:03

## 2023-10-29 RX ADMIN — POLYETHYLENE GLYCOL 3350 17 GRAM(S): 17 POWDER, FOR SOLUTION ORAL at 11:07

## 2023-10-29 RX ADMIN — ATORVASTATIN CALCIUM 20 MILLIGRAM(S): 80 TABLET, FILM COATED ORAL at 21:13

## 2023-10-29 RX ADMIN — TIOTROPIUM BROMIDE AND OLODATEROL 2 PUFF(S): 3.124; 2.736 SPRAY, METERED RESPIRATORY (INHALATION) at 11:08

## 2023-10-29 RX ADMIN — Medication 1: at 21:54

## 2023-10-29 NOTE — PROGRESS NOTE ADULT - PROBLEM SELECTOR PLAN 5
- sputum cultures   - Acetylcysteine via nebulizer q8h    - Pulmonary follow up - monitor off abx  - Start airway clearance: humidified oxygen, duonebs q6h, 3% nebulized saline q6h, chest PT q8 h, aerobika/acapella q6 h, chest vest q12h  - titrate oxygen to O2 >88%, use humidified oxygen  - incentive spirometry, OOB to chair, PT/OT CT Chest - Mucoid impaction and atelectasis  - F/u sputum cultures   - Pulmonary follow up - monitor off abx  - Start airway clearance: humidified oxygen, duonebs q6h, 3% nebulized saline q6h, chest PT q8 h, aerobika/acapella q6 h, chest vest q12h  - titrate oxygen to O2 >88%, use humidified oxygen  - incentive spirometry, OOB to chair, PT/OT

## 2023-10-29 NOTE — PROGRESS NOTE ADULT - ASSESSMENT
Patient is a 72 female with PMH of asthma on chronic steroids, bronchiectasis, allergic rhinitis,  recurrent PNA,  tracheomalacia s/p tracheoplasty, ESBL proteus infections (sputum),  diabetes, paroxysmal A-fib on coumadin, colorectal cancer many years ago status post colostomy, recent hospitalization at an outside hospital  for acute respiratory failure with hypoxia secondary to asthma/COPD exacerbation and bronchopneumonia 6/5/2023 - 6/16/2023), and AVR 2022 ( Dr. Cabrera)   s/p  TAVR- [facility] 9 on 9/6 by Dr. Gonsalves and Dr. Leahy) , recently admitted (9/12- 10/01/23) for SOB, treated for bronchectasis flare, during course treated fo shingles , as well as covid infection; patient now presents for altered mental state for the past two days now resolved. Pulmonary consulted for cough, sputum production    #recommendations  cough productive worse than baseline  concern for bronchiectasis exacerbation - no fever or leukocytosis today.   possible asthma contributing - can increase chronic steroid dose to 32 mg methylprednisolone (patient requesting only methylprednislone)  will discuss with ID re: abx use : start possibly ertapenem  f/u repeat sputum culture: proteus  continue symbicort 160 2 puffs BID, duoneb q6 hr standing   please start airway clearance: humidified oxygen, duonebs q6h, 3% nebulized saline q6h, chest PT q8 h, aerobika/acapella q6 h, chest vest q12h  titrate oxygen to O2 >88%, use humidified oxygen  incentive spirometry, OOB to chair, PT/OT  DVT ppx as tolerated

## 2023-10-29 NOTE — PROGRESS NOTE ADULT - PROBLEM SELECTOR PLAN 3
INR 3.2 , taking 7.5mg at home, will reduce to 5   - monitor daily INR  - 3mg Warfarin tonight  - Continue with Plavix 75 mg PO daily   - Continue with Mexiletine 200 mg every 8 hours   - Continue with Atorvastatin 20 mg PO HS.

## 2023-10-29 NOTE — PROGRESS NOTE ADULT - SUBJECTIVE AND OBJECTIVE BOX
CHIEF COMPLAINT:Patient is a 73y old  Female who presents with a chief complaint of ams x 2 days (29 Oct 2023 09:07)      Interval Events: continues to have productive cough and sparse wheezing. Patient denies fevers, chills, chest pain,nausea, abdominal pain, diarrhea, constipation, dysuria, leg swelling, headache, light headedness    REVIEW OF SYSTEMS:  [x] All other systems negative except per HPI   [ ] Unable to assess ROS because ________    OBJECTIVE:  ICU Vital Signs Last 24 Hrs  T(C): 36.6 (29 Oct 2023 05:18), Max: 36.6 (29 Oct 2023 05:18)  T(F): 97.9 (29 Oct 2023 05:18), Max: 97.9 (29 Oct 2023 05:18)  HR: 85 (29 Oct 2023 05:18) (79 - 85)  BP: 108/64 (29 Oct 2023 05:18) (108/64 - 144/72)  BP(mean): --  ABP: --  ABP(mean): --  RR: 18 (29 Oct 2023 05:18) (18 - 18)  SpO2: 99% (29 Oct 2023 05:18) (95% - 99%)    O2 Parameters below as of 29 Oct 2023 05:18  Patient On (Oxygen Delivery Method): room air              10-28 @ 07:01  -  10-29 @ 07:00  --------------------------------------------------------  IN: 240 mL / OUT: 2100 mL / NET: -1860 mL        PHYSICAL EXAM:  GENERAL: NAD, well-groomed, well-developed  HEAD:  Atraumatic, Normocephalic  EYES: EOMI, PERRLA, conjunctiva and sclera clear  ENMT: No tonsillar erythema, exudates, or enlargement; Moist mucous membranes, Good dentition, No lesions  NECK: Supple, No JVD, Normal thyroid  CHEST/LUNG: rhonchi with wheezing  HEART: Regular rate and rhythm; No murmurs, rubs, or gallops  ABDOMEN: Soft, Nontender, Nondistended; Bowel sounds present  VASCULAR:  2+ Peripheral Pulses, No clubbing, cyanosis, or edema  LYMPH: No lymphadenopathy noted  SKIN: No rashes or lesions  NERVOUS SYSTEM:  Alert & Oriented X3, Good concentration; Motor Strength 5/5 B/L upper and lower extremities; DTRs 2+ intact and symmetric    HOSPITAL MEDICATIONS:  MEDICATIONS  (STANDING):  acetylcysteine 10%  Inhalation 2 milliLiter(s) Inhalation every 8 hours  albuterol/ipratropium for Nebulization 3 milliLiter(s) Nebulizer every 6 hours  ascorbic acid 500 milliGRAM(s) Oral daily  atorvastatin 20 milliGRAM(s) Oral at bedtime  budesonide 160 MICROgram(s)/formoterol 4.5 MICROgram(s) Inhaler 2 Puff(s) Inhalation two times a day  clopidogrel Tablet 75 milliGRAM(s) Oral daily  dextrose 5%. 1000 milliLiter(s) (50 mL/Hr) IV Continuous <Continuous>  dextrose 5%. 1000 milliLiter(s) (100 mL/Hr) IV Continuous <Continuous>  dextrose 50% Injectable 12.5 Gram(s) IV Push once  dextrose 50% Injectable 25 Gram(s) IV Push once  famotidine    Tablet 20 milliGRAM(s) Oral daily  gabapentin 100 milliGRAM(s) Oral every 8 hours  glucagon  Injectable 1 milliGRAM(s) IntraMuscular once  guaiFENesin  milliGRAM(s) Oral every 12 hours  influenza  Vaccine (HIGH DOSE) 0.7 milliLiter(s) IntraMuscular once  insulin glargine Injectable (LANTUS) 20 Unit(s) SubCutaneous at bedtime  insulin lispro (ADMELOG) corrective regimen sliding scale   SubCutaneous three times a day before meals  insulin lispro (ADMELOG) corrective regimen sliding scale   SubCutaneous at bedtime  insulin lispro Injectable (ADMELOG) 14 Unit(s) SubCutaneous three times a day before meals  methylPREDNISolone 32 milliGRAM(s) Oral daily  mexiletine 200 milliGRAM(s) Oral every 8 hours  montelukast 10 milliGRAM(s) Oral daily  multivitamin 1 Tablet(s) Oral daily  pantoprazole    Tablet 40 milliGRAM(s) Oral before breakfast  polyethylene glycol 3350 17 Gram(s) Oral daily  senna 2 Tablet(s) Oral at bedtime  sodium chloride 3%  Inhalation 4 milliLiter(s) Inhalation every 6 hours  tiotropium 2.5 MICROgram(s)/olodaterol 2.5 MICROgram(s) (STIOLTO) Inhaler 2 Puff(s) Inhalation daily  warfarin 3 milliGRAM(s) Oral at bedtime    MEDICATIONS  (PRN):  acetaminophen     Tablet .. 650 milliGRAM(s) Oral every 6 hours PRN Temp greater or equal to 38C (100.4F), Mild Pain (1 - 3)  dextrose Oral Gel 15 Gram(s) Oral once PRN Blood Glucose LESS THAN 70 milliGRAM(s)/deciliter  melatonin 3 milliGRAM(s) Oral at bedtime PRN Insomnia  ondansetron Injectable 4 milliGRAM(s) IV Push every 8 hours PRN Nausea and/or Vomiting      LABS:    The Labs were reviewed by me   The Radiology was reviewed by me    EKG tracing reviewed by me    10-29    139  |  101  |  10  ----------------------------<  298<H>  4.1   |  27  |  0.67  10-28    142  |  106  |  8   ----------------------------<  140<H>  3.8   |  26  |  0.54  10-27    134<L>  |  99  |  14  ----------------------------<  458<HH>  5.5<H>   |  22  |  0.58    Ca    8.7      29 Oct 2023 07:17  Ca    8.8      28 Oct 2023 07:05  Ca    9.3      27 Oct 2023 19:28  Phos  2.7     10-29  Mg     2.0     10-29    TPro  5.8<L>  /  Alb  3.3  /  TBili  0.1<L>  /  DBili  x   /  AST  18  /  ALT  16  /  AlkPhos  79  10-28  TPro  6.9  /  Alb  3.8  /  TBili  0.3  /  DBili  x   /  AST  48<H>  /  ALT  23  /  AlkPhos  94  10-27    Magnesium: 2.0 mg/dL (10-29-23 @ 07:17)    Phosphorus: 2.7 mg/dL (10-29-23 @ 07:17)      PT/INR - ( 29 Oct 2023 07:17 )   PT: 31.4 sec;   INR: 3.09 ratio         PTT - ( 29 Oct 2023 07:17 )  PTT:42.1 sec              Urinalysis Basic - ( 29 Oct 2023 07:17 )    Color: x / Appearance: x / SG: x / pH: x  Gluc: 298 mg/dL / Ketone: x  / Bili: x / Urobili: x   Blood: x / Protein: x / Nitrite: x   Leuk Esterase: x / RBC: x / WBC x   Sq Epi: x / Non Sq Epi: x / Bacteria: x                              9.9    9.12  )-----------( 360      ( 29 Oct 2023 07:17 )             33.9                         9.6    8.62  )-----------( 354      ( 28 Oct 2023 07:11 )             33.3                         10.5   10.58 )-----------( 362      ( 27 Oct 2023 19:28 )             36.8     CAPILLARY BLOOD GLUCOSE      POCT Blood Glucose.: 291 mg/dL (29 Oct 2023 12:16)  POCT Blood Glucose.: 351 mg/dL (29 Oct 2023 08:28)  POCT Blood Glucose.: 152 mg/dL (28 Oct 2023 21:34)  POCT Blood Glucose.: 184 mg/dL (28 Oct 2023 17:55)    Blood Gas Source Venous: Venous (10-27-23 @ 19:00)      MICROBIOLOGY:     RADIOLOGY:  [ ] Reviewed and interpreted by me    Point of Care Ultrasound Findings:    PFT:    EKG:

## 2023-10-29 NOTE — PROGRESS NOTE ADULT - SUBJECTIVE AND OBJECTIVE BOX
DATE OF SERVICE: 10-29-23 @ 09:08    Patient is a 73y old  Female who presents with a chief complaint of ams x 2 days (28 Oct 2023 14:24)      SUBJECTIVE / OVERNIGHT EVENTS: Patient seen and examined at bedside. No acute events overnight. Patient states her mental status is at baseline but she is still having difficulty with word finding. Patient also reports her breathing is at her baseline.      MEDICATIONS  (STANDING):  acetylcysteine 10%  Inhalation 2 milliLiter(s) Inhalation every 8 hours  albuterol/ipratropium for Nebulization 3 milliLiter(s) Nebulizer every 6 hours  ascorbic acid 500 milliGRAM(s) Oral daily  atorvastatin 20 milliGRAM(s) Oral at bedtime  budesonide 160 MICROgram(s)/formoterol 4.5 MICROgram(s) Inhaler 2 Puff(s) Inhalation two times a day  clopidogrel Tablet 75 milliGRAM(s) Oral daily  dextrose 5%. 1000 milliLiter(s) (50 mL/Hr) IV Continuous <Continuous>  dextrose 5%. 1000 milliLiter(s) (100 mL/Hr) IV Continuous <Continuous>  dextrose 50% Injectable 12.5 Gram(s) IV Push once  dextrose 50% Injectable 25 Gram(s) IV Push once  famotidine    Tablet 20 milliGRAM(s) Oral daily  gabapentin 100 milliGRAM(s) Oral every 8 hours  glucagon  Injectable 1 milliGRAM(s) IntraMuscular once  guaiFENesin  milliGRAM(s) Oral every 12 hours  influenza  Vaccine (HIGH DOSE) 0.7 milliLiter(s) IntraMuscular once  insulin glargine Injectable (LANTUS) 20 Unit(s) SubCutaneous at bedtime  insulin lispro (ADMELOG) corrective regimen sliding scale   SubCutaneous three times a day before meals  insulin lispro (ADMELOG) corrective regimen sliding scale   SubCutaneous at bedtime  insulin lispro Injectable (ADMELOG) 14 Unit(s) SubCutaneous three times a day before meals  methylPREDNISolone 32 milliGRAM(s) Oral daily  mexiletine 200 milliGRAM(s) Oral every 8 hours  montelukast 10 milliGRAM(s) Oral daily  multivitamin 1 Tablet(s) Oral daily  pantoprazole    Tablet 40 milliGRAM(s) Oral before breakfast  polyethylene glycol 3350 17 Gram(s) Oral daily  senna 2 Tablet(s) Oral at bedtime  sodium chloride 3%  Inhalation 4 milliLiter(s) Inhalation every 6 hours  tiotropium 2.5 MICROgram(s)/olodaterol 2.5 MICROgram(s) (STIOLTO) Inhaler 2 Puff(s) Inhalation daily    MEDICATIONS  (PRN):  acetaminophen     Tablet .. 650 milliGRAM(s) Oral every 6 hours PRN Temp greater or equal to 38C (100.4F), Mild Pain (1 - 3)  dextrose Oral Gel 15 Gram(s) Oral once PRN Blood Glucose LESS THAN 70 milliGRAM(s)/deciliter  melatonin 3 milliGRAM(s) Oral at bedtime PRN Insomnia  ondansetron Injectable 4 milliGRAM(s) IV Push every 8 hours PRN Nausea and/or Vomiting      Vital Signs Last 24 Hrs  T(C): 36.6 (29 Oct 2023 05:18), Max: 36.6 (29 Oct 2023 05:18)  T(F): 97.9 (29 Oct 2023 05:18), Max: 97.9 (29 Oct 2023 05:18)  HR: 85 (29 Oct 2023 05:18) (79 - 85)  BP: 108/64 (29 Oct 2023 05:18) (108/64 - 144/72)  BP(mean): --  RR: 18 (29 Oct 2023 05:18) (18 - 18)  SpO2: 99% (29 Oct 2023 05:18) (95% - 99%)    Parameters below as of 29 Oct 2023 05:18  Patient On (Oxygen Delivery Method): room air      CAPILLARY BLOOD GLUCOSE      POCT Blood Glucose.: 351 mg/dL (29 Oct 2023 08:28)  POCT Blood Glucose.: 152 mg/dL (28 Oct 2023 21:34)  POCT Blood Glucose.: 184 mg/dL (28 Oct 2023 17:55)  POCT Blood Glucose.: 121 mg/dL (28 Oct 2023 11:30)    I&O's Summary    28 Oct 2023 07:01  -  29 Oct 2023 07:00  --------------------------------------------------------  IN: 240 mL / OUT: 2100 mL / NET: -1860 mL        PHYSICAL EXAM:  GENERAL: NAD, well-developed  HEAD:  Atraumatic, Normocephalic  EYES: PERRLA  NECK: Supple, No JVD  CHEST/LUNG: Expiratory wheeze  HEART: Regular rate and rhythm  ABDOMEN: Soft, Nontender, Nondistended; colostomy bag in place  EXTREMITIES:  2+ Peripheral Pulses, No edema  NEUROLOGY: non-focal  SKIN: No rashes or lesions    LABS:                        9.9    9.12  )-----------( 360      ( 29 Oct 2023 07:17 )             33.9     10-29    139  |  101  |  10  ----------------------------<  298<H>  4.1   |  27  |  0.67    Ca    8.7      29 Oct 2023 07:17  Phos  2.7     10-29  Mg     2.0     10-29    TPro  5.8<L>  /  Alb  3.3  /  TBili  0.1<L>  /  DBili  x   /  AST  18  /  ALT  16  /  AlkPhos  79  10-28    PT/INR - ( 29 Oct 2023 07:17 )   PT: 31.4 sec;   INR: 3.09 ratio         PTT - ( 29 Oct 2023 07:17 )  PTT:42.1 sec      Urinalysis Basic - ( 29 Oct 2023 07:17 )    Color: x / Appearance: x / SG: x / pH: x  Gluc: 298 mg/dL / Ketone: x  / Bili: x / Urobili: x   Blood: x / Protein: x / Nitrite: x   Leuk Esterase: x / RBC: x / WBC x   Sq Epi: x / Non Sq Epi: x / Bacteria: x        RADIOLOGY & ADDITIONAL TESTS:    Imaging Personally Reviewed:    Consultant(s) Notes Reviewed:      Care Discussed with Consultants/Other Providers:

## 2023-10-29 NOTE — PROGRESS NOTE ADULT - TIME BILLING
chart and data review, clinical assessment, and coordination of care. This excludes any time spent on separate procedures or teaching.
Physical exam  Review of labs and imaging  Review of pulmonary consult note  Discussion of case with resident

## 2023-10-29 NOTE — PROGRESS NOTE ADULT - PROBLEM SELECTOR PLAN 1
unsteady gait w/ episodic confusion, now at baseline mental state, no acute findings on CT Head ,no focal deficits on exam, mild leukocytosis but no other evidence for acute infection, hyperglycemic but no other majormetabolic derangements  - Neuro-checks   - MR brain ordered  - will consider neuro eval based on MR  - fall precautions   - PT

## 2023-10-30 ENCOUNTER — LABORATORY RESULT (OUTPATIENT)
Age: 73
End: 2023-10-30

## 2023-10-30 LAB
-  AMIKACIN: SIGNIFICANT CHANGE UP
-  AMIKACIN: SIGNIFICANT CHANGE UP
-  AMOXICILLIN/CLAVULANIC ACID: SIGNIFICANT CHANGE UP
-  AMOXICILLIN/CLAVULANIC ACID: SIGNIFICANT CHANGE UP
-  AMPICILLIN/SULBACTAM: SIGNIFICANT CHANGE UP
-  AMPICILLIN/SULBACTAM: SIGNIFICANT CHANGE UP
-  AMPICILLIN: SIGNIFICANT CHANGE UP
-  AMPICILLIN: SIGNIFICANT CHANGE UP
-  AZTREONAM: SIGNIFICANT CHANGE UP
-  AZTREONAM: SIGNIFICANT CHANGE UP
-  CEFAZOLIN: SIGNIFICANT CHANGE UP
-  CEFAZOLIN: SIGNIFICANT CHANGE UP
-  CEFEPIME: SIGNIFICANT CHANGE UP
-  CEFEPIME: SIGNIFICANT CHANGE UP
-  CEFTRIAXONE: SIGNIFICANT CHANGE UP
-  CEFTRIAXONE: SIGNIFICANT CHANGE UP
-  CIPROFLOXACIN: SIGNIFICANT CHANGE UP
-  CIPROFLOXACIN: SIGNIFICANT CHANGE UP
-  ERTAPENEM: SIGNIFICANT CHANGE UP
-  ERTAPENEM: SIGNIFICANT CHANGE UP
-  GENTAMICIN: SIGNIFICANT CHANGE UP
-  GENTAMICIN: SIGNIFICANT CHANGE UP
-  LEVOFLOXACIN: SIGNIFICANT CHANGE UP
-  LEVOFLOXACIN: SIGNIFICANT CHANGE UP
-  MEROPENEM: SIGNIFICANT CHANGE UP
-  MEROPENEM: SIGNIFICANT CHANGE UP
-  PIPERACILLIN/TAZOBACTAM: SIGNIFICANT CHANGE UP
-  PIPERACILLIN/TAZOBACTAM: SIGNIFICANT CHANGE UP
-  TOBRAMYCIN: SIGNIFICANT CHANGE UP
-  TOBRAMYCIN: SIGNIFICANT CHANGE UP
-  TRIMETHOPRIM/SULFAMETHOXAZOLE: SIGNIFICANT CHANGE UP
-  TRIMETHOPRIM/SULFAMETHOXAZOLE: SIGNIFICANT CHANGE UP
ALBUMIN SERPL ELPH-MCNC: 3.5 G/DL — SIGNIFICANT CHANGE UP (ref 3.3–5)
ALBUMIN SERPL ELPH-MCNC: 3.5 G/DL — SIGNIFICANT CHANGE UP (ref 3.3–5)
ALP SERPL-CCNC: 87 U/L — SIGNIFICANT CHANGE UP (ref 40–120)
ALP SERPL-CCNC: 87 U/L — SIGNIFICANT CHANGE UP (ref 40–120)
ALT FLD-CCNC: 16 U/L — SIGNIFICANT CHANGE UP (ref 10–45)
ALT FLD-CCNC: 16 U/L — SIGNIFICANT CHANGE UP (ref 10–45)
ANION GAP SERPL CALC-SCNC: 12 MMOL/L — SIGNIFICANT CHANGE UP (ref 5–17)
ANION GAP SERPL CALC-SCNC: 12 MMOL/L — SIGNIFICANT CHANGE UP (ref 5–17)
ANION GAP SERPL CALC-SCNC: 13 MMOL/L — SIGNIFICANT CHANGE UP (ref 5–17)
ANION GAP SERPL CALC-SCNC: 13 MMOL/L — SIGNIFICANT CHANGE UP (ref 5–17)
AST SERPL-CCNC: 34 U/L — SIGNIFICANT CHANGE UP (ref 10–40)
AST SERPL-CCNC: 34 U/L — SIGNIFICANT CHANGE UP (ref 10–40)
BILIRUB SERPL-MCNC: 0.1 MG/DL — LOW (ref 0.2–1.2)
BILIRUB SERPL-MCNC: 0.1 MG/DL — LOW (ref 0.2–1.2)
BUN SERPL-MCNC: 13 MG/DL — SIGNIFICANT CHANGE UP (ref 7–23)
BUN SERPL-MCNC: 13 MG/DL — SIGNIFICANT CHANGE UP (ref 7–23)
BUN SERPL-MCNC: 14 MG/DL — SIGNIFICANT CHANGE UP (ref 7–23)
BUN SERPL-MCNC: 14 MG/DL — SIGNIFICANT CHANGE UP (ref 7–23)
CALCIUM SERPL-MCNC: 9.2 MG/DL — SIGNIFICANT CHANGE UP (ref 8.4–10.5)
CALCIUM SERPL-MCNC: 9.2 MG/DL — SIGNIFICANT CHANGE UP (ref 8.4–10.5)
CALCIUM SERPL-MCNC: 9.3 MG/DL — SIGNIFICANT CHANGE UP (ref 8.4–10.5)
CALCIUM SERPL-MCNC: 9.3 MG/DL — SIGNIFICANT CHANGE UP (ref 8.4–10.5)
CHLORIDE SERPL-SCNC: 103 MMOL/L — SIGNIFICANT CHANGE UP (ref 96–108)
CHLORIDE SERPL-SCNC: 103 MMOL/L — SIGNIFICANT CHANGE UP (ref 96–108)
CHLORIDE SERPL-SCNC: 106 MMOL/L — SIGNIFICANT CHANGE UP (ref 96–108)
CHLORIDE SERPL-SCNC: 106 MMOL/L — SIGNIFICANT CHANGE UP (ref 96–108)
CO2 SERPL-SCNC: 21 MMOL/L — LOW (ref 22–31)
CO2 SERPL-SCNC: 21 MMOL/L — LOW (ref 22–31)
CO2 SERPL-SCNC: 23 MMOL/L — SIGNIFICANT CHANGE UP (ref 22–31)
CO2 SERPL-SCNC: 23 MMOL/L — SIGNIFICANT CHANGE UP (ref 22–31)
CREAT SERPL-MCNC: 0.57 MG/DL — SIGNIFICANT CHANGE UP (ref 0.5–1.3)
CREAT SERPL-MCNC: 0.57 MG/DL — SIGNIFICANT CHANGE UP (ref 0.5–1.3)
CREAT SERPL-MCNC: 0.61 MG/DL — SIGNIFICANT CHANGE UP (ref 0.5–1.3)
CREAT SERPL-MCNC: 0.61 MG/DL — SIGNIFICANT CHANGE UP (ref 0.5–1.3)
CULTURE RESULTS: ABNORMAL
CULTURE RESULTS: ABNORMAL
EGFR: 93 ML/MIN/1.73M2 — SIGNIFICANT CHANGE UP
EGFR: 93 ML/MIN/1.73M2 — SIGNIFICANT CHANGE UP
EGFR: 95 ML/MIN/1.73M2 — SIGNIFICANT CHANGE UP
EGFR: 95 ML/MIN/1.73M2 — SIGNIFICANT CHANGE UP
GLUCOSE BLDC GLUCOMTR-MCNC: 200 MG/DL — HIGH (ref 70–99)
GLUCOSE BLDC GLUCOMTR-MCNC: 200 MG/DL — HIGH (ref 70–99)
GLUCOSE BLDC GLUCOMTR-MCNC: 205 MG/DL — HIGH (ref 70–99)
GLUCOSE BLDC GLUCOMTR-MCNC: 205 MG/DL — HIGH (ref 70–99)
GLUCOSE BLDC GLUCOMTR-MCNC: 267 MG/DL — HIGH (ref 70–99)
GLUCOSE BLDC GLUCOMTR-MCNC: 267 MG/DL — HIGH (ref 70–99)
GLUCOSE BLDC GLUCOMTR-MCNC: 292 MG/DL — HIGH (ref 70–99)
GLUCOSE BLDC GLUCOMTR-MCNC: 292 MG/DL — HIGH (ref 70–99)
GLUCOSE BLDC GLUCOMTR-MCNC: 334 MG/DL — HIGH (ref 70–99)
GLUCOSE BLDC GLUCOMTR-MCNC: 334 MG/DL — HIGH (ref 70–99)
GLUCOSE SERPL-MCNC: 307 MG/DL — HIGH (ref 70–99)
GLUCOSE SERPL-MCNC: 307 MG/DL — HIGH (ref 70–99)
GLUCOSE SERPL-MCNC: 335 MG/DL — HIGH (ref 70–99)
GLUCOSE SERPL-MCNC: 335 MG/DL — HIGH (ref 70–99)
INR BLD: 2.23 RATIO — HIGH (ref 0.85–1.18)
INR BLD: 2.23 RATIO — HIGH (ref 0.85–1.18)
METHOD TYPE: SIGNIFICANT CHANGE UP
METHOD TYPE: SIGNIFICANT CHANGE UP
ORGANISM # SPEC MICROSCOPIC CNT: ABNORMAL
POTASSIUM SERPL-MCNC: 4.6 MMOL/L — SIGNIFICANT CHANGE UP (ref 3.5–5.3)
POTASSIUM SERPL-MCNC: 4.6 MMOL/L — SIGNIFICANT CHANGE UP (ref 3.5–5.3)
POTASSIUM SERPL-MCNC: 6 MMOL/L — HIGH (ref 3.5–5.3)
POTASSIUM SERPL-MCNC: 6 MMOL/L — HIGH (ref 3.5–5.3)
POTASSIUM SERPL-SCNC: 4.6 MMOL/L — SIGNIFICANT CHANGE UP (ref 3.5–5.3)
POTASSIUM SERPL-SCNC: 4.6 MMOL/L — SIGNIFICANT CHANGE UP (ref 3.5–5.3)
POTASSIUM SERPL-SCNC: 6 MMOL/L — HIGH (ref 3.5–5.3)
POTASSIUM SERPL-SCNC: 6 MMOL/L — HIGH (ref 3.5–5.3)
PROT SERPL-MCNC: 6.5 G/DL — SIGNIFICANT CHANGE UP (ref 6–8.3)
PROT SERPL-MCNC: 6.5 G/DL — SIGNIFICANT CHANGE UP (ref 6–8.3)
PROTHROM AB SERPL-ACNC: 22.9 SEC — HIGH (ref 9.5–13)
PROTHROM AB SERPL-ACNC: 22.9 SEC — HIGH (ref 9.5–13)
SODIUM SERPL-SCNC: 138 MMOL/L — SIGNIFICANT CHANGE UP (ref 135–145)
SODIUM SERPL-SCNC: 138 MMOL/L — SIGNIFICANT CHANGE UP (ref 135–145)
SODIUM SERPL-SCNC: 140 MMOL/L — SIGNIFICANT CHANGE UP (ref 135–145)
SODIUM SERPL-SCNC: 140 MMOL/L — SIGNIFICANT CHANGE UP (ref 135–145)
SPECIMEN SOURCE: SIGNIFICANT CHANGE UP
SPECIMEN SOURCE: SIGNIFICANT CHANGE UP

## 2023-10-30 PROCEDURE — 93010 ELECTROCARDIOGRAM REPORT: CPT

## 2023-10-30 PROCEDURE — 99233 SBSQ HOSP IP/OBS HIGH 50: CPT | Mod: GC

## 2023-10-30 PROCEDURE — 99232 SBSQ HOSP IP/OBS MODERATE 35: CPT

## 2023-10-30 RX ORDER — WARFARIN SODIUM 2.5 MG/1
4 TABLET ORAL AT BEDTIME
Refills: 0 | Status: DISCONTINUED | OUTPATIENT
Start: 2023-10-30 | End: 2023-10-30

## 2023-10-30 RX ORDER — ERTAPENEM SODIUM 1 G/1
1000 INJECTION, POWDER, LYOPHILIZED, FOR SOLUTION INTRAMUSCULAR; INTRAVENOUS ONCE
Refills: 0 | Status: COMPLETED | OUTPATIENT
Start: 2023-10-30 | End: 2023-10-30

## 2023-10-30 RX ORDER — DIPHENHYDRAMINE HCL 50 MG
25 CAPSULE ORAL ONCE
Refills: 0 | Status: DISCONTINUED | OUTPATIENT
Start: 2023-10-30 | End: 2023-10-30

## 2023-10-30 RX ORDER — INSULIN HUMAN 100 [IU]/ML
5 INJECTION, SOLUTION SUBCUTANEOUS ONCE
Refills: 0 | Status: COMPLETED | OUTPATIENT
Start: 2023-10-30 | End: 2023-10-30

## 2023-10-30 RX ORDER — WARFARIN SODIUM 2.5 MG/1
4 TABLET ORAL AT BEDTIME
Refills: 0 | Status: COMPLETED | OUTPATIENT
Start: 2023-10-30 | End: 2023-10-30

## 2023-10-30 RX ORDER — ERTAPENEM SODIUM 1 G/1
INJECTION, POWDER, LYOPHILIZED, FOR SOLUTION INTRAMUSCULAR; INTRAVENOUS
Refills: 0 | Status: DISCONTINUED | OUTPATIENT
Start: 2023-10-30 | End: 2023-11-05

## 2023-10-30 RX ORDER — DIPHENHYDRAMINE HCL 50 MG
50 CAPSULE ORAL DAILY
Refills: 0 | Status: DISCONTINUED | OUTPATIENT
Start: 2023-10-30 | End: 2023-10-31

## 2023-10-30 RX ORDER — DEXTROSE 50 % IN WATER 50 %
50 SYRINGE (ML) INTRAVENOUS ONCE
Refills: 0 | Status: COMPLETED | OUTPATIENT
Start: 2023-10-30 | End: 2023-10-30

## 2023-10-30 RX ORDER — SODIUM ZIRCONIUM CYCLOSILICATE 10 G/10G
5 POWDER, FOR SUSPENSION ORAL THREE TIMES A DAY
Refills: 0 | Status: COMPLETED | OUTPATIENT
Start: 2023-10-30 | End: 2023-11-01

## 2023-10-30 RX ORDER — ERTAPENEM SODIUM 1 G/1
1000 INJECTION, POWDER, LYOPHILIZED, FOR SOLUTION INTRAMUSCULAR; INTRAVENOUS EVERY 24 HOURS
Refills: 0 | Status: DISCONTINUED | OUTPATIENT
Start: 2023-10-31 | End: 2023-11-05

## 2023-10-30 RX ADMIN — Medication 50 MILLIGRAM(S): at 12:39

## 2023-10-30 RX ADMIN — Medication 32 MILLIGRAM(S): at 05:59

## 2023-10-30 RX ADMIN — MONTELUKAST 10 MILLIGRAM(S): 4 TABLET, CHEWABLE ORAL at 11:30

## 2023-10-30 RX ADMIN — Medication 3: at 18:22

## 2023-10-30 RX ADMIN — WARFARIN SODIUM 4 MILLIGRAM(S): 2.5 TABLET ORAL at 22:09

## 2023-10-30 RX ADMIN — GABAPENTIN 100 MILLIGRAM(S): 400 CAPSULE ORAL at 23:20

## 2023-10-30 RX ADMIN — CLOPIDOGREL BISULFATE 75 MILLIGRAM(S): 75 TABLET, FILM COATED ORAL at 11:29

## 2023-10-30 RX ADMIN — SODIUM CHLORIDE 4 MILLILITER(S): 9 INJECTION INTRAMUSCULAR; INTRAVENOUS; SUBCUTANEOUS at 11:29

## 2023-10-30 RX ADMIN — GABAPENTIN 100 MILLIGRAM(S): 400 CAPSULE ORAL at 18:25

## 2023-10-30 RX ADMIN — TIOTROPIUM BROMIDE AND OLODATEROL 2 PUFF(S): 3.124; 2.736 SPRAY, METERED RESPIRATORY (INHALATION) at 11:32

## 2023-10-30 RX ADMIN — SODIUM CHLORIDE 4 MILLILITER(S): 9 INJECTION INTRAMUSCULAR; INTRAVENOUS; SUBCUTANEOUS at 18:26

## 2023-10-30 RX ADMIN — Medication 600 MILLIGRAM(S): at 06:01

## 2023-10-30 RX ADMIN — Medication 3 MILLILITER(S): at 18:24

## 2023-10-30 RX ADMIN — Medication 600 MILLIGRAM(S): at 18:24

## 2023-10-30 RX ADMIN — SODIUM CHLORIDE 4 MILLILITER(S): 9 INJECTION INTRAMUSCULAR; INTRAVENOUS; SUBCUTANEOUS at 23:42

## 2023-10-30 RX ADMIN — GABAPENTIN 100 MILLIGRAM(S): 400 CAPSULE ORAL at 11:28

## 2023-10-30 RX ADMIN — MEXILETINE HYDROCHLORIDE 200 MILLIGRAM(S): 150 CAPSULE ORAL at 23:20

## 2023-10-30 RX ADMIN — PANTOPRAZOLE SODIUM 40 MILLIGRAM(S): 20 TABLET, DELAYED RELEASE ORAL at 06:10

## 2023-10-30 RX ADMIN — SENNA PLUS 2 TABLET(S): 8.6 TABLET ORAL at 22:09

## 2023-10-30 RX ADMIN — Medication 2 MILLILITER(S): at 23:42

## 2023-10-30 RX ADMIN — Medication 1: at 22:11

## 2023-10-30 RX ADMIN — Medication 14 UNIT(S): at 08:39

## 2023-10-30 RX ADMIN — FAMOTIDINE 20 MILLIGRAM(S): 10 INJECTION INTRAVENOUS at 11:31

## 2023-10-30 RX ADMIN — Medication 3 MILLILITER(S): at 11:29

## 2023-10-30 RX ADMIN — INSULIN HUMAN 5 UNIT(S): 100 INJECTION, SOLUTION SUBCUTANEOUS at 18:23

## 2023-10-30 RX ADMIN — MEXILETINE HYDROCHLORIDE 200 MILLIGRAM(S): 150 CAPSULE ORAL at 11:28

## 2023-10-30 RX ADMIN — ERTAPENEM SODIUM 120 MILLIGRAM(S): 1 INJECTION, POWDER, LYOPHILIZED, FOR SOLUTION INTRAMUSCULAR; INTRAVENOUS at 13:05

## 2023-10-30 RX ADMIN — Medication 1 TABLET(S): at 11:31

## 2023-10-30 RX ADMIN — Medication 1: at 13:02

## 2023-10-30 RX ADMIN — MEXILETINE HYDROCHLORIDE 200 MILLIGRAM(S): 150 CAPSULE ORAL at 18:24

## 2023-10-30 RX ADMIN — BUDESONIDE AND FORMOTEROL FUMARATE DIHYDRATE 2 PUFF(S): 160; 4.5 AEROSOL RESPIRATORY (INHALATION) at 18:25

## 2023-10-30 RX ADMIN — Medication 2: at 08:39

## 2023-10-30 RX ADMIN — GABAPENTIN 100 MILLIGRAM(S): 400 CAPSULE ORAL at 02:12

## 2023-10-30 RX ADMIN — Medication 14 UNIT(S): at 18:23

## 2023-10-30 RX ADMIN — BUDESONIDE AND FORMOTEROL FUMARATE DIHYDRATE 2 PUFF(S): 160; 4.5 AEROSOL RESPIRATORY (INHALATION) at 06:05

## 2023-10-30 RX ADMIN — INSULIN GLARGINE 20 UNIT(S): 100 INJECTION, SOLUTION SUBCUTANEOUS at 22:11

## 2023-10-30 RX ADMIN — MEXILETINE HYDROCHLORIDE 200 MILLIGRAM(S): 150 CAPSULE ORAL at 02:12

## 2023-10-30 RX ADMIN — Medication 500 MILLIGRAM(S): at 11:30

## 2023-10-30 RX ADMIN — Medication 3 MILLILITER(S): at 23:21

## 2023-10-30 RX ADMIN — SODIUM ZIRCONIUM CYCLOSILICATE 5 GRAM(S): 10 POWDER, FOR SUSPENSION ORAL at 18:57

## 2023-10-30 RX ADMIN — POLYETHYLENE GLYCOL 3350 17 GRAM(S): 17 POWDER, FOR SOLUTION ORAL at 11:30

## 2023-10-30 RX ADMIN — Medication 50 MILLILITER(S): at 18:23

## 2023-10-30 RX ADMIN — Medication 14 UNIT(S): at 13:02

## 2023-10-30 NOTE — PROGRESS NOTE ADULT - ASSESSMENT
Patient is a 72 female with PMH of asthma on chronic steroids, bronchiectasis, allergic rhinitis,  recurrent PNA,  tracheomalacia s/p tracheoplasty, ESBL proteus infections (sputum),  diabetes, paroxysmal A-fib on coumadin, colorectal cancer many years ago status post colostomy, recent hospitalization at an outside hospital  for acute respiratory failure with hypoxia secondary to asthma/COPD exacerbation and bronchopneumonia 6/5/2023 - 6/16/2023), and AVR 2022 ( Dr. Cabrera)   s/p  TAVR- MERLIN 9 on 9/6 by Dr. Gonsalves and Dr. Leahy) , recently admitted (9/12- 10/01/23) for SOB, treated for bronchectasis flare, during course treated fo shingles , as well as covid infection; patient now presents for altered mental state for the past two days now resolved. Pulmonary consulted for cough, sputum production    #recommendations  cough productive worse than baseline  concern for bronchiectasis exacerbation - no fever or leukocytosis today.   possible asthma contributing - can increase chronic steroid dose to 32 mg methylprednisolone (patient requesting only methylprednisolone  c/w ertapenem, would treat for 10-14 days but will defer to ID  sputum culture: ESBL proteus  continue symbicort 160 2 puffs BID, duoneb q6 hr standing   please stop stiolto (patient is already getting beta agonist and muscarinic antagonist in the duoneb)  please start airway clearance: humidified oxygen, duonebs q6h, 3% nebulized saline q6h, chest PT q8 h, aerobika/acapella q6 h, chest vest q12h  titrate oxygen to O2 >88%, use humidified oxygen  incentive spirometry, OOB to chair, PT/OT  DVT ppx as tolerated  Please call back with any questions

## 2023-10-30 NOTE — ADVANCED PRACTICE NURSE CONSULT - ASSESSMENT
The pt was encountered on [facility]- Mrs Bloom is on Airborne isolation as she is Covid + She is awake and alert and engaging in conversation. She is on a RB-[employer]ors P 500 support surface and moves about independently in the bed. As she was a/w a pressure injury, she was seen by nutrition and a consult is noted .  Upon assessment, she presents with a dry, scabbed wound on her left heel measuring 0.5cmx 1cm x0cm. there was no drainage, the periwound skin was intact .  The right medial malleolus presents with a scab- pt reports "I got a wound there from the boot and it had MRSA" The wound is healed at this time. The pts feet are warm with + pedal pulses.  Given hx of diabetes and prior wounds- will request a Podiatry consult for further evaluation.  The pt turned to her [facility] for skin assessment, Staff had applied a Pure-wick catheter to divert urine from the skin. On the sacrum and b/l buttocks was a wound measuring 4cmx 2cm x0cm. The wound presents with scattered superficial areas of skin loss and hyperpigmented intact skin. will classify as a deep tissue injury present on admission. will recommend a foam dressing to cushion and to promote moist healing- the pt reported that the "coccyx bone is so tender"  I  wanted to turn the pt before I left- she declined saying "I know all about turning and I do it all the time" I reviewed the interventions for PI prevention.

## 2023-10-30 NOTE — PROGRESS NOTE ADULT - PROBLEM SELECTOR PLAN 1
unsteady gait w/ episodic confusion, now at baseline mental state, no acute findings on CT Head ,no focal deficits on exam, mild leukocytosis but no other evidence for acute infection, hyperglycemic but no other majormetabolic derangements  - Neuro-checks   - MR brain ordered - no recent infarct, white matter microangiopathic change mildly increased from 2021, scattered nonspecific microhemorrhage.   - Outpatient neurology follow up   - fall precautions   - PT

## 2023-10-30 NOTE — DIETITIAN INITIAL EVALUATION ADULT - NSFNSNUTRHOMESUPPLEMENTFT_GEN_A_CORE
Patient reports no vitamins or supplements taken at home PTA. Vitamin C documented per H&P Home Medications.

## 2023-10-30 NOTE — DIETITIAN INITIAL EVALUATION ADULT - ENERGY INTAKE
Patient reports good, unchanged appetite and PO intake since admit. No documented intake per flowsheets. Patient reports not receiving oral nutrition supplements, currently ordered for Glucerna. Patient reports she does not like [employer] so if we are out of Glucerna she would rather not receive oral nutrition supplements. Adequate (%)

## 2023-10-30 NOTE — DIETITIAN INITIAL EVALUATION ADULT - PERSON TAUGHT/METHOD
Discussed coumadin/vitamin K drug interaction. Encouraged patient to continue with consistent intake of vitamin K and reviewed foods high in Vitamin K.    Encouraged patient to continue monitor carbohydrate intake at home and pairing proteins with carbohydrates for glycemic control and in setting of DM and long-term steroid use.     Educated pt on the importance of consuming a diet adequate in protein and micronutrients for wound healing/skin integrity.     Patient with questions regarding diet order and how to make sure she receiving consistent Vitamin K everyday in-house. Patient would like spinach at lunch everyday and broccoli at dinner everyday. RD to honor food preferences as available/able./verbal instruction/patient instructed

## 2023-10-30 NOTE — ADVANCED PRACTICE NURSE CONSULT - RECOMMEDATIONS
Will recommend the followin. Sacrum, b/l buttocks: Cavilon to periwound skin, Allevyn foam- change every 3 days and prn for soiling/drainage  2. Continue to encourage mobility  3. Off-load heels with the zeke-lock cushion  4. Podiatry consult for further evaluation  5. Seat cushion when OOB ot chair  6. Nutrition support as pt condition allows  Tx plan discussed with RN/pt

## 2023-10-30 NOTE — DIETITIAN INITIAL EVALUATION ADULT - PERTINENT LABORATORY DATA
10-29    139  |  101  |  10  ----------------------------<  298<H>  4.1   |  27  |  0.67    Ca    8.7      29 Oct 2023 07:17  Phos  2.7     10-29  Mg     2.0     10-29    POCT Blood Glucose.: 205 mg/dL (10-30-23 @ 08:31)  A1C with Estimated Average Glucose Result: 5.7 % (09-08-23 @ 06:39)  A1C with Estimated Average Glucose Result: 9.1 % (06-17-23 @ 10:27)  A1C with Estimated Average Glucose Result: 9.4 % (05-12-23 @ 07:40)

## 2023-10-30 NOTE — PROGRESS NOTE ADULT - SUBJECTIVE AND OBJECTIVE BOX
PULMONARY PROGRESS NOTE    PATIENT INFORMATION:  NAME: RAYRAY RODRIGUEZ:  MRN: [id number]    CHIEF COMPLAINT: Patient is a 73y old  Female who presents with a chief complaint of Altered mental status    Per chart: "72F w/ asthma on chronic steroids, bronchiectasis,  recurrent PNA,  tracheomalacia s/p tracheoplasty, DM  paroxysmal A-fib on coumadin, colorectal cancer s/p colectomy and ostomy ,  s/p AVR (2022) and  TAVR- [facility]LIN 9 on 9/6 , recent shingles , as well as covid infection;  p/w AMS x few days"   (30 Oct 2023 11:20)      [x] INITIAL CONSULT, H&P, FAMILY HISTORY and PAST MEDICAL AND SURGICAL HISTORY REVIEWED    OVERNIGHT EVENTS, CHANGES TO HPI, SUBJECTIVE:     ========================REVIEW OF SYSTEMS========================  [x] ROS negative except as per HPI    ========================MEDICATIONS=============================  MEDICATIONS  (STANDING):  acetylcysteine 10%  Inhalation 2 milliLiter(s) Inhalation every 8 hours  albuterol/ipratropium for Nebulization 3 milliLiter(s) Nebulizer every 6 hours  ascorbic acid 500 milliGRAM(s) Oral daily  atorvastatin 20 milliGRAM(s) Oral at bedtime  budesonide 160 MICROgram(s)/formoterol 4.5 MICROgram(s) Inhaler 2 Puff(s) Inhalation two times a day  clopidogrel Tablet 75 milliGRAM(s) Oral daily  dextrose 5%. 1000 milliLiter(s) (100 mL/Hr) IV Continuous <Continuous>  dextrose 5%. 1000 milliLiter(s) (50 mL/Hr) IV Continuous <Continuous>  dextrose 50% Injectable 12.5 Gram(s) IV Push once  dextrose 50% Injectable 25 Gram(s) IV Push once  ertapenem  IVPB      famotidine    Tablet 20 milliGRAM(s) Oral daily  gabapentin 100 milliGRAM(s) Oral every 8 hours  glucagon  Injectable 1 milliGRAM(s) IntraMuscular once  guaiFENesin  milliGRAM(s) Oral every 12 hours  influenza  Vaccine (HIGH DOSE) 0.7 milliLiter(s) IntraMuscular once  insulin glargine Injectable (LANTUS) 20 Unit(s) SubCutaneous at bedtime  insulin lispro (ADMELOG) corrective regimen sliding scale   SubCutaneous three times a day before meals  insulin lispro (ADMELOG) corrective regimen sliding scale   SubCutaneous at bedtime  insulin lispro Injectable (ADMELOG) 14 Unit(s) SubCutaneous three times a day before meals  methylPREDNISolone 32 milliGRAM(s) Oral daily  mexiletine 200 milliGRAM(s) Oral every 8 hours  montelukast 10 milliGRAM(s) Oral daily  multivitamin 1 Tablet(s) Oral daily  pantoprazole    Tablet 40 milliGRAM(s) Oral before breakfast  polyethylene glycol 3350 17 Gram(s) Oral daily  senna 2 Tablet(s) Oral at bedtime  sodium chloride 3%  Inhalation 4 milliLiter(s) Inhalation every 6 hours  sodium zirconium cyclosilicate 5 Gram(s) Oral three times a day  tiotropium 2.5 MICROgram(s)/olodaterol 2.5 MICROgram(s) (STIOLTO) Inhaler 2 Puff(s) Inhalation daily  warfarin 4 milliGRAM(s) Oral at bedtime      MEDICATIONS  (PRN):  acetaminophen     Tablet .. 650 milliGRAM(s) Oral every 6 hours PRN Temp greater or equal to 38C (100.4F), Mild Pain (1 - 3)  dextrose Oral Gel 15 Gram(s) Oral once PRN Blood Glucose LESS THAN 70 milliGRAM(s)/deciliter  diphenhydrAMINE 50 milliGRAM(s) Oral daily PRN Allergy symptoms  melatonin 3 milliGRAM(s) Oral at bedtime PRN Insomnia  ondansetron Injectable 4 milliGRAM(s) IV Push every 8 hours PRN Nausea and/or Vomiting      ========================PHYSICAL EXAM============================    VITALS: ICU Vital Signs Last 24 Hrs  T(C): 37.1 (30 Oct 2023 13:03), Max: 37.1 (30 Oct 2023 13:03)  T(F): 98.8 (30 Oct 2023 13:03), Max: 98.8 (30 Oct 2023 13:03)  HR: 83 (30 Oct 2023 13:03) (77 - 93)  BP: 158/72 (30 Oct 2023 13:03) (121/88 - 158/72)  BP(mean): --  ABP: --  ABP(mean): --  RR: 18 (30 Oct 2023 13:03) (17 - 18)  SpO2: 96% (30 Oct 2023 13:03) (96% - 98%)    O2 Parameters below as of 30 Oct 2023 13:03  Patient On (Oxygen Delivery Method): room air            INTAKE and OUTPUT: I&O's Summary    29 Oct 2023 07:01  -  30 Oct 2023 07:00  --------------------------------------------------------  IN: 960 mL / OUT: 900 mL / NET: 60 mL    30 Oct 2023 07:01  -  30 Oct 2023 19:09  --------------------------------------------------------  IN: 480 mL / OUT: 0 mL / NET: 480 mL        VENTILATOR SETTINGS:     Physical Exam  CONST:     NAD, well-appearing; well-developed; appears stated age  ENMT:       MMM, no pharyngeal injection or exudates; oropharynx not crowded   RESP :        Normal respiratory effort; CTA bilaterally; no W/R/R  CHEST:       No TTP, no lines/ports; symmetric chest expansion  CARDIO:     RRR, normal S1 and S2, no M/R/G  VASC:         No JVD, no peripheral edema, pulses 2+ B/L, Cap refill <2s  ABD:           Soft, NT/ND  MSK:          No clubbing of digits; no joint swelling or TTP  PSYCH        A&O to person, place, and time; affect appropriate  NEURO:     Non-focal; no gross sensory deficits; moving all extremities   SKIN:          No rashes; no palpable lesions       ========================LABORATORY RESULTS AND IMAGING=============                        9.9    9.12  )-----------( 360      ( 29 Oct 2023 07:17 )             33.9                                                    10-30    140  |  106  |  13  ----------------------------<  307<H>  6.0<H>   |  21<L>  |  0.57    Ca    9.2      30 Oct 2023 17:15  Phos  2.7     10-29  Mg     2.0     10-29    TPro  6.5  /  Alb  3.5  /  TBili  0.1<L>  /  DBili  x   /  AST  34  /  ALT  16  /  AlkPhos  87  10-30          Creatinine Trend: 0.57<--, 0.67<--, 0.54<--, 0.58<--, 0.54<--    [x] RADIOLOGY REVIEWED AND INTERPRETED BY ME

## 2023-10-30 NOTE — DIETITIAN INITIAL EVALUATION ADULT - ORAL INTAKE PTA/DIET HISTORY
Patient reports good, normal appetite and PO intake at home PTA. Eats 3 meals/day. Patient taking Coumadin PTA and monitors intake of foods rich in Vitamin K such as spinach, broccoli, and asparagus - makes sure she consumes a consistent amount everyday. Patient with DM - monitors carbohydrate intake, prioritizes protein intake at meals, pairs carbohydrates with protein. Confirms shellfish allergy which causes breathing issues.

## 2023-10-30 NOTE — DIETITIAN INITIAL EVALUATION ADULT - OTHER INFO
Patient reports UBW 143lb, reports no recent changes in weight.  Dosing weight: 134.9lb (10/27) - ? accuracy as patient reports no changes in PO intake PTA.   IBW: 135lb, %IBW: 100% based on dosing weight.  Weight history per [facility]: 143.1 pounds (08-11), 143 pounds (07-25), 140 pounds (06-04), 130.15 pounds (05-05), 135 pounds (03-19, 129 pounds (02-03), 129 pounds (01-24).  Per previous RD note, patient noted to have standing weight 145lb on 9/15 during previous admission.  Weight appears stable. RD to continue to monitor weight trends as available/able.     -S/p NS 0.9% bolus on 10/27.  -Ordered for Multivitamin and vitamin C.  -Current insulin regimen in-house: Lantus 20 units at bedtime, Admelog 14 units premeal, Correctional sliding scale insulin Admelog.

## 2023-10-30 NOTE — PROGRESS NOTE ADULT - SUBJECTIVE AND OBJECTIVE BOX
DATE OF SERVICE: 10-30-23 @ 08:26    Patient is a 73y old  Female who presents with a chief complaint of ams x 2 days (29 Oct 2023 13:21)      SUBJECTIVE / OVERNIGHT EVENTS: No acute events overnight, patient seen and examined at bedside. Discussed MRI results with patient and that she will likely need outpatient neurology follow up for her word finding difficulty. Yesterday afternoon patient developed a worsening cough that is productive with whitish-yellow sputum. Sputum culture has grown ESBL proteus mirabilis.     MEDICATIONS  (STANDING):  acetylcysteine 10%  Inhalation 2 milliLiter(s) Inhalation every 8 hours  albuterol/ipratropium for Nebulization 3 milliLiter(s) Nebulizer every 6 hours  ascorbic acid 500 milliGRAM(s) Oral daily  atorvastatin 20 milliGRAM(s) Oral at bedtime  budesonide 160 MICROgram(s)/formoterol 4.5 MICROgram(s) Inhaler 2 Puff(s) Inhalation two times a day  clopidogrel Tablet 75 milliGRAM(s) Oral daily  dextrose 5%. 1000 milliLiter(s) (50 mL/Hr) IV Continuous <Continuous>  dextrose 5%. 1000 milliLiter(s) (100 mL/Hr) IV Continuous <Continuous>  dextrose 50% Injectable 25 Gram(s) IV Push once  dextrose 50% Injectable 12.5 Gram(s) IV Push once  famotidine    Tablet 20 milliGRAM(s) Oral daily  gabapentin 100 milliGRAM(s) Oral every 8 hours  glucagon  Injectable 1 milliGRAM(s) IntraMuscular once  guaiFENesin  milliGRAM(s) Oral every 12 hours  influenza  Vaccine (HIGH DOSE) 0.7 milliLiter(s) IntraMuscular once  insulin glargine Injectable (LANTUS) 20 Unit(s) SubCutaneous at bedtime  insulin lispro (ADMELOG) corrective regimen sliding scale   SubCutaneous at bedtime  insulin lispro (ADMELOG) corrective regimen sliding scale   SubCutaneous three times a day before meals  insulin lispro Injectable (ADMELOG) 14 Unit(s) SubCutaneous three times a day before meals  methylPREDNISolone 32 milliGRAM(s) Oral daily  mexiletine 200 milliGRAM(s) Oral every 8 hours  montelukast 10 milliGRAM(s) Oral daily  multivitamin 1 Tablet(s) Oral daily  pantoprazole    Tablet 40 milliGRAM(s) Oral before breakfast  polyethylene glycol 3350 17 Gram(s) Oral daily  senna 2 Tablet(s) Oral at bedtime  sodium chloride 3%  Inhalation 4 milliLiter(s) Inhalation every 6 hours  tiotropium 2.5 MICROgram(s)/olodaterol 2.5 MICROgram(s) (STIOLTO) Inhaler 2 Puff(s) Inhalation daily    MEDICATIONS  (PRN):  acetaminophen     Tablet .. 650 milliGRAM(s) Oral every 6 hours PRN Temp greater or equal to 38C (100.4F), Mild Pain (1 - 3)  dextrose Oral Gel 15 Gram(s) Oral once PRN Blood Glucose LESS THAN 70 milliGRAM(s)/deciliter  melatonin 3 milliGRAM(s) Oral at bedtime PRN Insomnia  ondansetron Injectable 4 milliGRAM(s) IV Push every 8 hours PRN Nausea and/or Vomiting      Vital Signs Last 24 Hrs  T(C): 36.9 (30 Oct 2023 05:34), Max: 36.9 (30 Oct 2023 05:34)  T(F): 98.5 (30 Oct 2023 05:34), Max: 98.5 (30 Oct 2023 05:34)  HR: 77 (30 Oct 2023 05:34) (77 - 93)  BP: 127/73 (30 Oct 2023 05:34) (121/88 - 162/80)  BP(mean): --  RR: 18 (30 Oct 2023 05:34) (17 - 18)  SpO2: 97% (30 Oct 2023 05:34) (97% - 98%)    Parameters below as of 30 Oct 2023 05:34  Patient On (Oxygen Delivery Method): room air      CAPILLARY BLOOD GLUCOSE      POCT Blood Glucose.: 286 mg/dL (29 Oct 2023 21:52)  POCT Blood Glucose.: 290 mg/dL (29 Oct 2023 17:31)  POCT Blood Glucose.: 291 mg/dL (29 Oct 2023 12:16)  POCT Blood Glucose.: 351 mg/dL (29 Oct 2023 08:28)    I&O's Summary    29 Oct 2023 07:01  -  30 Oct 2023 07:00  --------------------------------------------------------  IN: 960 mL / OUT: 900 mL / NET: 60 mL        PHYSICAL EXAM:  GENERAL: NAD, well-developed  HEAD:  Atraumatic, Normocephalic  EYES: PERRLA  NECK: Supple, No JVD  CHEST/LUNG: Expiratory wheeze, productive cough  HEART: Regular rate and rhythm  ABDOMEN: Soft, Nontender, Nondistended; colostomy bag in place  EXTREMITIES:  2+ Peripheral Pulses, No edema  NEUROLOGY: non-focal  SKIN: No rashes or lesions    LABS:                        9.9    9.12  )-----------( 360      ( 29 Oct 2023 07:17 )             33.9     10-29    139  |  101  |  10  ----------------------------<  298<H>  4.1   |  27  |  0.67    Ca    8.7      29 Oct 2023 07:17  Phos  2.7     10-29  Mg     2.0     10-29      PT/INR - ( 29 Oct 2023 07:17 )   PT: 31.4 sec;   INR: 3.09 ratio         PTT - ( 29 Oct 2023 07:17 )  PTT:42.1 sec      Urinalysis Basic - ( 29 Oct 2023 07:17 )    Color: x / Appearance: x / SG: x / pH: x  Gluc: 298 mg/dL / Ketone: x  / Bili: x / Urobili: x   Blood: x / Protein: x / Nitrite: x   Leuk Esterase: x / RBC: x / WBC x   Sq Epi: x / Non Sq Epi: x / Bacteria: x        RADIOLOGY & ADDITIONAL TESTS:    Imaging Personally Reviewed:    Consultant(s) Notes Reviewed:      Care Discussed with Consultants/Other Providers:   DATE OF SERVICE: 10-30-23 @ 08:26    Patient is a 73y old  Female who presents with a chief complaint of ams x 2 days (29 Oct 2023 13:21)      SUBJECTIVE / OVERNIGHT EVENTS: No acute events overnight, patient seen and examined at bedside. Discussed MRI results with patient and that she will likely need outpatient neurology follow up for her word finding difficulty. Yesterday afternoon patient developed a worsening cough that is productive with whitish-yellow sputum. Sputum culture has grown ESBL proteus mirabilis. Will start ertapenem. Patient requests benadryl prior to antibiotics which is what she did last admission.     MEDICATIONS  (STANDING):  acetylcysteine 10%  Inhalation 2 milliLiter(s) Inhalation every 8 hours  albuterol/ipratropium for Nebulization 3 milliLiter(s) Nebulizer every 6 hours  ascorbic acid 500 milliGRAM(s) Oral daily  atorvastatin 20 milliGRAM(s) Oral at bedtime  budesonide 160 MICROgram(s)/formoterol 4.5 MICROgram(s) Inhaler 2 Puff(s) Inhalation two times a day  clopidogrel Tablet 75 milliGRAM(s) Oral daily  dextrose 5%. 1000 milliLiter(s) (50 mL/Hr) IV Continuous <Continuous>  dextrose 5%. 1000 milliLiter(s) (100 mL/Hr) IV Continuous <Continuous>  dextrose 50% Injectable 25 Gram(s) IV Push once  dextrose 50% Injectable 12.5 Gram(s) IV Push once  famotidine    Tablet 20 milliGRAM(s) Oral daily  gabapentin 100 milliGRAM(s) Oral every 8 hours  glucagon  Injectable 1 milliGRAM(s) IntraMuscular once  guaiFENesin  milliGRAM(s) Oral every 12 hours  influenza  Vaccine (HIGH DOSE) 0.7 milliLiter(s) IntraMuscular once  insulin glargine Injectable (LANTUS) 20 Unit(s) SubCutaneous at bedtime  insulin lispro (ADMELOG) corrective regimen sliding scale   SubCutaneous at bedtime  insulin lispro (ADMELOG) corrective regimen sliding scale   SubCutaneous three times a day before meals  insulin lispro Injectable (ADMELOG) 14 Unit(s) SubCutaneous three times a day before meals  methylPREDNISolone 32 milliGRAM(s) Oral daily  mexiletine 200 milliGRAM(s) Oral every 8 hours  montelukast 10 milliGRAM(s) Oral daily  multivitamin 1 Tablet(s) Oral daily  pantoprazole    Tablet 40 milliGRAM(s) Oral before breakfast  polyethylene glycol 3350 17 Gram(s) Oral daily  senna 2 Tablet(s) Oral at bedtime  sodium chloride 3%  Inhalation 4 milliLiter(s) Inhalation every 6 hours  tiotropium 2.5 MICROgram(s)/olodaterol 2.5 MICROgram(s) (STIOLTO) Inhaler 2 Puff(s) Inhalation daily    MEDICATIONS  (PRN):  acetaminophen     Tablet .. 650 milliGRAM(s) Oral every 6 hours PRN Temp greater or equal to 38C (100.4F), Mild Pain (1 - 3)  dextrose Oral Gel 15 Gram(s) Oral once PRN Blood Glucose LESS THAN 70 milliGRAM(s)/deciliter  melatonin 3 milliGRAM(s) Oral at bedtime PRN Insomnia  ondansetron Injectable 4 milliGRAM(s) IV Push every 8 hours PRN Nausea and/or Vomiting      Vital Signs Last 24 Hrs  T(C): 36.9 (30 Oct 2023 05:34), Max: 36.9 (30 Oct 2023 05:34)  T(F): 98.5 (30 Oct 2023 05:34), Max: 98.5 (30 Oct 2023 05:34)  HR: 77 (30 Oct 2023 05:34) (77 - 93)  BP: 127/73 (30 Oct 2023 05:34) (121/88 - 162/80)  BP(mean): --  RR: 18 (30 Oct 2023 05:34) (17 - 18)  SpO2: 97% (30 Oct 2023 05:34) (97% - 98%)    Parameters below as of 30 Oct 2023 05:34  Patient On (Oxygen Delivery Method): room air      CAPILLARY BLOOD GLUCOSE      POCT Blood Glucose.: 286 mg/dL (29 Oct 2023 21:52)  POCT Blood Glucose.: 290 mg/dL (29 Oct 2023 17:31)  POCT Blood Glucose.: 291 mg/dL (29 Oct 2023 12:16)  POCT Blood Glucose.: 351 mg/dL (29 Oct 2023 08:28)    I&O's Summary    29 Oct 2023 07:01  -  30 Oct 2023 07:00  --------------------------------------------------------  IN: 960 mL / OUT: 900 mL / NET: 60 mL        PHYSICAL EXAM:  GENERAL: NAD, well-developed  HEAD:  Atraumatic, Normocephalic  EYES: PERRLA  NECK: Supple, No JVD  CHEST/LUNG: Expiratory wheeze, productive cough  HEART: Regular rate and rhythm  ABDOMEN: Soft, Nontender, Nondistended; colostomy bag in place  EXTREMITIES:  2+ Peripheral Pulses, No edema  NEUROLOGY: non-focal  SKIN: No rashes or lesions    LABS:                        9.9    9.12  )-----------( 360      ( 29 Oct 2023 07:17 )             33.9     10-29    139  |  101  |  10  ----------------------------<  298<H>  4.1   |  27  |  0.67    Ca    8.7      29 Oct 2023 07:17  Phos  2.7     10-29  Mg     2.0     10-29      PT/INR - ( 29 Oct 2023 07:17 )   PT: 31.4 sec;   INR: 3.09 ratio         PTT - ( 29 Oct 2023 07:17 )  PTT:42.1 sec      Urinalysis Basic - ( 29 Oct 2023 07:17 )    Color: x / Appearance: x / SG: x / pH: x  Gluc: 298 mg/dL / Ketone: x  / Bili: x / Urobili: x   Blood: x / Protein: x / Nitrite: x   Leuk Esterase: x / RBC: x / WBC x   Sq Epi: x / Non Sq Epi: x / Bacteria: x        RADIOLOGY & ADDITIONAL TESTS:    Imaging Personally Reviewed:    Consultant(s) Notes Reviewed:      Care Discussed with Consultants/Other Providers:

## 2023-10-30 NOTE — DIETITIAN INITIAL EVALUATION ADULT - NSICDXPASTSURGICALHX_GEN_ALL_CORE_FT
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    H/O pelvic surgery 5 years ago - s/p fracture    H/O total knee replacement, bilateral 5 years ago    History of partial hysterectomy 30 years ago - fibroids    History of sinus surgery multiple sinus surgeries    History of tracheomalacia 2015 - attempted tracheal stenting ([facility])- course complicated by obstruction, respiratory failure, multiple CPR attempts -  stent discontinued; 10/20/2016 Tracheobronchoplasty (Prolene Mesh) performed at [facility] by Dr Zapien    Rectal bleeding exam under anesthesia ([facility]) 2/2018    S/P aortic valve replacement     S/P bronchoscopy 6/5/2018 - [place] (Dr Zapien) no evidence of tracheobronchomalacia in trachea or bronchial tubes    S/P TAVR (transcatheter aortic valve replacement)

## 2023-10-30 NOTE — DIETITIAN INITIAL EVALUATION ADULT - REASON FOR ADMISSION
Altered mental status    Per chart: "72F w/ asthma on chronic steroids, bronchiectasis,  recurrent PNA,  tracheomalacia s/p tracheoplasty, DM  paroxysmal A-fib on coumadin, colorectal cancer s/p colectomy and ostomy ,  s/p AVR (2022) and  TAVR- [facility]LIN 9 on 9/6 , recent shingles , as well as covid infection;  p/w AMS x few days"

## 2023-10-30 NOTE — DIETITIAN INITIAL EVALUATION ADULT - OTHER CALCULATIONS
Defer fluid needs to team.  Estimated nutrient needs based on dosing weight 134.9lb, with consideration for skin integrity

## 2023-10-30 NOTE — PROGRESS NOTE ADULT - ASSESSMENT
73 F PMH asthma on steroids, bronchiectasis, allergic rhinitis, recurrent pneumonia, tracheomalacia s/p tracheoplasty, Diabetes, AF on Coumadin, colorectal cancer s/p colostomy, AVR 2022 and s/p TAVR - [facility] in September 2023, recently admitted September to Oct 2023 for bronchiectasis flare, Shingles, Covid-19 s/p Remdesivir in Sept, who now presents to the ED with c/o gait dysfunction  No fevers, no leukocytosis  AMS 2 days, wobbly gait  CXR clear  RVP COVID  UA neg  Likely shedding COVID  Longstanding cough per patient, CT chest without pneumonia (showed mucoid impaction/atelectasis), no signs acute infection  Overall, COVID, AMS  - Monitor off abx/antiviral  - Isolation precautions per infection control  - Care/eval for gait per neurology  - Defer to pulmonary if they would recommend treating with antibiotic in setting bronchiectasis, if so, then would select Ertapenem based on culture    Signing off. Please call with further questions or change in status.    Pepito Valladares MD  Contact on TEAMS messaging from 9am - 5pm  From 5pm-9am, on weekends, or if no response call [phone]

## 2023-10-30 NOTE — DIETITIAN INITIAL EVALUATION ADULT - REASON
No overt signs of fat/muscle loss observed at time of visit, patient reporting stable weight hx and no changes in appetite/PO intake.

## 2023-10-30 NOTE — DIETITIAN INITIAL EVALUATION ADULT - PERTINENT MEDS FT
MEDICATIONS  (STANDING):  ascorbic acid 500 milliGRAM(s) Oral daily  atorvastatin 20 milliGRAM(s) Oral at bedtime  clopidogrel Tablet 75 milliGRAM(s) Oral daily  ertapenem  IVPB      ertapenem  IVPB 1000 milliGRAM(s) IV Intermittent once  famotidine    Tablet 20 milliGRAM(s) Oral daily  gabapentin 100 milliGRAM(s) Oral every 8 hours  insulin glargine Injectable (LANTUS) 20 Unit(s) SubCutaneous at bedtime  insulin lispro (ADMELOG) corrective regimen sliding scale   SubCutaneous at bedtime  insulin lispro (ADMELOG) corrective regimen sliding scale   SubCutaneous three times a day before meals  insulin lispro Injectable (ADMELOG) 14 Unit(s) SubCutaneous three times a day before meals  methylPREDNISolone 32 milliGRAM(s) Oral daily  mexiletine 200 milliGRAM(s) Oral every 8 hours  montelukast 10 milliGRAM(s) Oral daily  multivitamin 1 Tablet(s) Oral daily  pantoprazole    Tablet 40 milliGRAM(s) Oral before breakfast  polyethylene glycol 3350 17 Gram(s) Oral daily  senna 2 Tablet(s) Oral at bedtime    MEDICATIONS  (PRN):  ondansetron Injectable 4 milliGRAM(s) IV Push every 8 hours PRN Nausea and/or Vomiting

## 2023-10-30 NOTE — DIETITIAN INITIAL EVALUATION ADULT - NSFNSPHYEXAMSKINFT_GEN_A_CORE
per flowsheets: sacrum Suspected Deep Tissue Injury, sacrum stage 2 pressure injury, left heel Suspected Deep Tissue Injury

## 2023-10-30 NOTE — DIETITIAN INITIAL EVALUATION ADULT - ADD RECOMMEND
-Continue current diet: Consistent Carbohydrate. Recommend removing Glucerna oral nutrition supplements from diet order due to supply chain shortages and patient not liking Zyante Glucose Support, the current substitution in place for Glucerna.  -Continue multivitamin and vitamin C for wound healing pending no medical contraindications.   -Monitor PO intake, diet, weight, labs, skin, GI symptoms, and BM regularity.  -RD remains available upon request and will follow up per protocol.

## 2023-10-30 NOTE — PROGRESS NOTE ADULT - PROBLEM SELECTOR PLAN 3
INR 3.2 , taking 7.5mg at home, will reduce to 5   - monitor daily INR  - 3mg Warfarin tonight  - Continue with Plavix 75 mg PO daily   - Continue with Mexiletine 200 mg every 8 hours   - Continue with Atorvastatin 20 mg PO HS. INR 3.2 , taking 7.5mg at home  - monitor daily INR, coumadin dosing based on that  - Continue with Plavix 75 mg PO daily   - Continue with Mexiletine 200 mg every 8 hours   - Continue with Atorvastatin 20 mg PO HS  - Reached out to patient's cardiologist if patient can be on DOAC

## 2023-10-30 NOTE — DIETITIAN INITIAL EVALUATION ADULT - NS FNS DIET ORDER
Diet, Regular:   Consistent Carbohydrate {Evening Snack} (CSTCHOSN)  Supplement Feeding Modality:  Oral  Glucerna Shake Cans or Servings Per Day:  1       Frequency:  Three Times a day (10-28-23 @ 18:30) [Active]

## 2023-10-30 NOTE — PROGRESS NOTE ADULT - PROBLEM SELECTOR PLAN 5
CT Chest - Mucoid impaction and atelectasis    - Pulmonary consult, appreciate recs  - Start airway clearance: humidified oxygen, duonebs q6h, 3% nebulized saline q6h, chest PT q8 h, aerobika/acapella q6 h, chest vest q12h  - titrate oxygen to O2 >88%, use humidified oxygen  - incentive spirometry, OOB to chair, PT/OT  - Sputum culture with ESBL proteus mirabilis   - Start CT Chest - Mucoid impaction and atelectasis    - Pulmonary consult, appreciate recs  - Start airway clearance: humidified oxygen, duonebs q6h, 3% nebulized saline q6h, chest PT q8 h, aerobika/acapella q6 h, chest vest q12h  - titrate oxygen to O2 >88%, use humidified oxygen  - incentive spirometry, OOB to chair, PT/OT  - Sputum culture with ESBL proteus mirabilis   - Start Ertapenem, PO benadryl 30 minutes prior to administration

## 2023-10-30 NOTE — DIETITIAN INITIAL EVALUATION ADULT - NSFNSADHERENCEPTAFT_GEN_A_CORE
Patient with hx DM, A1C 5.7% from 9/8/23 indicates good glycemic control PTA. Patient reports she does not eat without checking blood glucose levels before meals, checks blood glucose levels 4-5x/day, and is compliant with prescribed insulin regimen. Insulin Basaglar 16 units at bedtime and 6 units in the AM, Humalog correctional sliding scale documented per H&P Home Medications.

## 2023-10-30 NOTE — DIETITIAN INITIAL EVALUATION ADULT - NSFNSGIIOFT_GEN_A_CORE
Patient reports no nausea/vomiting; patient with colostomy, reports she is passing hard stool; bowel regimen: miralax, senna.

## 2023-10-30 NOTE — PROGRESS NOTE ADULT - SUBJECTIVE AND OBJECTIVE BOX
Pharmacy requesting refill: Metformin  Last OV: 8/17/22  Next OV: None  Last refill: 3/14/22  Last labs: 8/17/22 A1c  Refilled x 1.   CC: F/U for CoVID    Saw/spoke to patient. No fevers, no chills. No new complaints. Still ongoing coughing.    Allergies  penicillin (Anaphylaxis)  iodine (Short breath; Swelling)  tetanus toxoid (Short breath)  Avelox (Short breath; Pruritus)  aspirin (Short breath)  Dilaudid (Short breath)  codeine (Short breath)  Valium (Short breath)  shellfish (Anaphylaxis)  cefepime (Anaphylaxis)    ANTIMICROBIALS:  ertapenem  IVPB      PE:    Vital Signs Last 24 Hrs  T(C): 37.1 (30 Oct 2023 13:03), Max: 37.1 (30 Oct 2023 13:03)  T(F): 98.8 (30 Oct 2023 13:03), Max: 98.8 (30 Oct 2023 13:03)  HR: 83 (30 Oct 2023 13:03) (77 - 93)  BP: 158/72 (30 Oct 2023 13:03) (121/88 - 158/72)  RR: 18 (30 Oct 2023 13:03) (17 - 18)  SpO2: 96% (30 Oct 2023 13:03) (96% - 98%)    Gen: AOx3, NAD, non-toxic  CV: Nontachycardic  Resp: Breathing comfortably, RA  Abd: Soft, nontender  : No Amezcua  IV/Skin: No thrombophlebitis    LABS:                        9.9    9.12  )-----------( 360      ( 29 Oct 2023 07:17 )             33.9     10-29    139  |  101  |  10  ----------------------------<  298<H>  4.1   |  27  |  0.67    Ca    8.7      29 Oct 2023 07:17  Phos  2.7     10-29  Mg     2.0     10-29    Urinalysis Basic - ( 29 Oct 2023 07:17 )    Color: x / Appearance: x / SG: x / pH: x  Gluc: 298 mg/dL / Ketone: x  / Bili: x / Urobili: x   Blood: x / Protein: x / Nitrite: x   Leuk Esterase: x / RBC: x / WBC x   Sq Epi: x / Non Sq Epi: x / Bacteria: x    MICROBIOLOGY:    .Sputum Sputum  10-28-23   Numerous Proteus mirabilis ESBL  Normal Respiratory Jessica present  --  Proteus mirabilis ESBL    Clean Catch Clean Catch (Midstream)  10-27-23   <10,000 CFU/mL Normal Urogenital Jessica  --  --    .Blood Blood-Peripheral  10-27-23   No growth at 48 Hours  --  --    .Blood Blood-Peripheral  10-27-23   No growth at 48 Hours  --  --    RADIOLOGY:    10/28    IMPRESSION:  Mucoid impaction and atelectasis.

## 2023-10-31 LAB
24R-OH-CALCIDIOL SERPL-MCNC: 25.6 NG/ML — LOW (ref 30–80)
24R-OH-CALCIDIOL SERPL-MCNC: 25.6 NG/ML — LOW (ref 30–80)
ANION GAP SERPL CALC-SCNC: 14 MMOL/L — SIGNIFICANT CHANGE UP (ref 5–17)
ANION GAP SERPL CALC-SCNC: 14 MMOL/L — SIGNIFICANT CHANGE UP (ref 5–17)
ANISOCYTOSIS BLD QL: SIGNIFICANT CHANGE UP
ANISOCYTOSIS BLD QL: SIGNIFICANT CHANGE UP
BASOPHILS # BLD AUTO: 0 K/UL — SIGNIFICANT CHANGE UP (ref 0–0.2)
BASOPHILS # BLD AUTO: 0 K/UL — SIGNIFICANT CHANGE UP (ref 0–0.2)
BASOPHILS NFR BLD AUTO: 0 % — SIGNIFICANT CHANGE UP (ref 0–2)
BASOPHILS NFR BLD AUTO: 0 % — SIGNIFICANT CHANGE UP (ref 0–2)
BUN SERPL-MCNC: 18 MG/DL — SIGNIFICANT CHANGE UP (ref 7–23)
BUN SERPL-MCNC: 18 MG/DL — SIGNIFICANT CHANGE UP (ref 7–23)
CALCIUM SERPL-MCNC: 9.2 MG/DL — SIGNIFICANT CHANGE UP (ref 8.4–10.5)
CALCIUM SERPL-MCNC: 9.2 MG/DL — SIGNIFICANT CHANGE UP (ref 8.4–10.5)
CHLORIDE SERPL-SCNC: 100 MMOL/L — SIGNIFICANT CHANGE UP (ref 96–108)
CHLORIDE SERPL-SCNC: 100 MMOL/L — SIGNIFICANT CHANGE UP (ref 96–108)
CO2 SERPL-SCNC: 23 MMOL/L — SIGNIFICANT CHANGE UP (ref 22–31)
CO2 SERPL-SCNC: 23 MMOL/L — SIGNIFICANT CHANGE UP (ref 22–31)
CREAT SERPL-MCNC: 0.65 MG/DL — SIGNIFICANT CHANGE UP (ref 0.5–1.3)
CREAT SERPL-MCNC: 0.65 MG/DL — SIGNIFICANT CHANGE UP (ref 0.5–1.3)
DACRYOCYTES BLD QL SMEAR: SLIGHT — SIGNIFICANT CHANGE UP
DACRYOCYTES BLD QL SMEAR: SLIGHT — SIGNIFICANT CHANGE UP
EGFR: 92 ML/MIN/1.73M2 — SIGNIFICANT CHANGE UP
EGFR: 92 ML/MIN/1.73M2 — SIGNIFICANT CHANGE UP
EOSINOPHIL # BLD AUTO: 0 K/UL — SIGNIFICANT CHANGE UP (ref 0–0.5)
EOSINOPHIL # BLD AUTO: 0 K/UL — SIGNIFICANT CHANGE UP (ref 0–0.5)
EOSINOPHIL NFR BLD AUTO: 0 % — SIGNIFICANT CHANGE UP (ref 0–6)
EOSINOPHIL NFR BLD AUTO: 0 % — SIGNIFICANT CHANGE UP (ref 0–6)
FOLATE SERPL-MCNC: >20 NG/ML — SIGNIFICANT CHANGE UP
FOLATE SERPL-MCNC: >20 NG/ML — SIGNIFICANT CHANGE UP
GLUCOSE BLDC GLUCOMTR-MCNC: 161 MG/DL — HIGH (ref 70–99)
GLUCOSE BLDC GLUCOMTR-MCNC: 161 MG/DL — HIGH (ref 70–99)
GLUCOSE BLDC GLUCOMTR-MCNC: 165 MG/DL — HIGH (ref 70–99)
GLUCOSE BLDC GLUCOMTR-MCNC: 165 MG/DL — HIGH (ref 70–99)
GLUCOSE BLDC GLUCOMTR-MCNC: 296 MG/DL — HIGH (ref 70–99)
GLUCOSE BLDC GLUCOMTR-MCNC: 296 MG/DL — HIGH (ref 70–99)
GLUCOSE BLDC GLUCOMTR-MCNC: 329 MG/DL — HIGH (ref 70–99)
GLUCOSE BLDC GLUCOMTR-MCNC: 329 MG/DL — HIGH (ref 70–99)
GLUCOSE SERPL-MCNC: 374 MG/DL — HIGH (ref 70–99)
GLUCOSE SERPL-MCNC: 374 MG/DL — HIGH (ref 70–99)
HCT VFR BLD CALC: 35.9 % — SIGNIFICANT CHANGE UP (ref 34.5–45)
HCT VFR BLD CALC: 35.9 % — SIGNIFICANT CHANGE UP (ref 34.5–45)
HGB BLD-MCNC: 10.4 G/DL — LOW (ref 11.5–15.5)
HGB BLD-MCNC: 10.4 G/DL — LOW (ref 11.5–15.5)
INR BLD: 1.99 RATIO — HIGH (ref 0.85–1.18)
INR BLD: 1.99 RATIO — HIGH (ref 0.85–1.18)
LYMPHOCYTES # BLD AUTO: 0.42 K/UL — LOW (ref 1–3.3)
LYMPHOCYTES # BLD AUTO: 0.42 K/UL — LOW (ref 1–3.3)
LYMPHOCYTES # BLD AUTO: 2.7 % — LOW (ref 13–44)
LYMPHOCYTES # BLD AUTO: 2.7 % — LOW (ref 13–44)
MANUAL SMEAR VERIFICATION: SIGNIFICANT CHANGE UP
MANUAL SMEAR VERIFICATION: SIGNIFICANT CHANGE UP
MCHC RBC-ENTMCNC: 20.8 PG — LOW (ref 27–34)
MCHC RBC-ENTMCNC: 20.8 PG — LOW (ref 27–34)
MCHC RBC-ENTMCNC: 29 GM/DL — LOW (ref 32–36)
MCHC RBC-ENTMCNC: 29 GM/DL — LOW (ref 32–36)
MCV RBC AUTO: 71.8 FL — LOW (ref 80–100)
MCV RBC AUTO: 71.8 FL — LOW (ref 80–100)
MICROCYTES BLD QL: SLIGHT — SIGNIFICANT CHANGE UP
MICROCYTES BLD QL: SLIGHT — SIGNIFICANT CHANGE UP
MONOCYTES # BLD AUTO: 0.54 K/UL — SIGNIFICANT CHANGE UP (ref 0–0.9)
MONOCYTES # BLD AUTO: 0.54 K/UL — SIGNIFICANT CHANGE UP (ref 0–0.9)
MONOCYTES NFR BLD AUTO: 3.5 % — SIGNIFICANT CHANGE UP (ref 2–14)
MONOCYTES NFR BLD AUTO: 3.5 % — SIGNIFICANT CHANGE UP (ref 2–14)
NEUTROPHILS # BLD AUTO: 14.44 K/UL — HIGH (ref 1.8–7.4)
NEUTROPHILS # BLD AUTO: 14.44 K/UL — HIGH (ref 1.8–7.4)
NEUTROPHILS NFR BLD AUTO: 93.8 % — HIGH (ref 43–77)
NEUTROPHILS NFR BLD AUTO: 93.8 % — HIGH (ref 43–77)
OVALOCYTES BLD QL SMEAR: SLIGHT — SIGNIFICANT CHANGE UP
OVALOCYTES BLD QL SMEAR: SLIGHT — SIGNIFICANT CHANGE UP
PLAT MORPH BLD: ABNORMAL
PLAT MORPH BLD: ABNORMAL
PLATELET # BLD AUTO: 365 K/UL — SIGNIFICANT CHANGE UP (ref 150–400)
PLATELET # BLD AUTO: 365 K/UL — SIGNIFICANT CHANGE UP (ref 150–400)
POIKILOCYTOSIS BLD QL AUTO: SIGNIFICANT CHANGE UP
POIKILOCYTOSIS BLD QL AUTO: SIGNIFICANT CHANGE UP
POLYCHROMASIA BLD QL SMEAR: SLIGHT — SIGNIFICANT CHANGE UP
POLYCHROMASIA BLD QL SMEAR: SLIGHT — SIGNIFICANT CHANGE UP
POTASSIUM SERPL-MCNC: 4.8 MMOL/L — SIGNIFICANT CHANGE UP (ref 3.5–5.3)
POTASSIUM SERPL-MCNC: 4.8 MMOL/L — SIGNIFICANT CHANGE UP (ref 3.5–5.3)
POTASSIUM SERPL-SCNC: 4.8 MMOL/L — SIGNIFICANT CHANGE UP (ref 3.5–5.3)
POTASSIUM SERPL-SCNC: 4.8 MMOL/L — SIGNIFICANT CHANGE UP (ref 3.5–5.3)
PROTHROM AB SERPL-ACNC: 21.5 SEC — HIGH (ref 9.5–13)
PROTHROM AB SERPL-ACNC: 21.5 SEC — HIGH (ref 9.5–13)
RBC # BLD: 5 M/UL — SIGNIFICANT CHANGE UP (ref 3.8–5.2)
RBC # BLD: 5 M/UL — SIGNIFICANT CHANGE UP (ref 3.8–5.2)
RBC # FLD: 22.3 % — HIGH (ref 10.3–14.5)
RBC # FLD: 22.3 % — HIGH (ref 10.3–14.5)
RBC BLD AUTO: ABNORMAL
RBC BLD AUTO: ABNORMAL
SCHISTOCYTES BLD QL AUTO: SLIGHT — SIGNIFICANT CHANGE UP
SCHISTOCYTES BLD QL AUTO: SLIGHT — SIGNIFICANT CHANGE UP
SODIUM SERPL-SCNC: 137 MMOL/L — SIGNIFICANT CHANGE UP (ref 135–145)
SODIUM SERPL-SCNC: 137 MMOL/L — SIGNIFICANT CHANGE UP (ref 135–145)
VIT B12 SERPL-MCNC: 961 PG/ML — SIGNIFICANT CHANGE UP (ref 232–1245)
VIT B12 SERPL-MCNC: 961 PG/ML — SIGNIFICANT CHANGE UP (ref 232–1245)
WBC # BLD: 15.39 K/UL — HIGH (ref 3.8–10.5)
WBC # BLD: 15.39 K/UL — HIGH (ref 3.8–10.5)
WBC # FLD AUTO: 15.39 K/UL — HIGH (ref 3.8–10.5)
WBC # FLD AUTO: 15.39 K/UL — HIGH (ref 3.8–10.5)

## 2023-10-31 PROCEDURE — 99233 SBSQ HOSP IP/OBS HIGH 50: CPT | Mod: GC

## 2023-10-31 RX ORDER — INSULIN GLARGINE 100 [IU]/ML
24 INJECTION, SOLUTION SUBCUTANEOUS AT BEDTIME
Refills: 0 | Status: DISCONTINUED | OUTPATIENT
Start: 2023-10-31 | End: 2023-11-02

## 2023-10-31 RX ORDER — DIPHENHYDRAMINE HCL 50 MG
50 CAPSULE ORAL DAILY
Refills: 0 | Status: DISCONTINUED | OUTPATIENT
Start: 2023-10-31 | End: 2023-11-05

## 2023-10-31 RX ORDER — WARFARIN SODIUM 2.5 MG/1
3 TABLET ORAL AT BEDTIME
Refills: 0 | Status: COMPLETED | OUTPATIENT
Start: 2023-10-31 | End: 2023-10-31

## 2023-10-31 RX ORDER — SODIUM CHLORIDE 9 MG/ML
4 INJECTION INTRAMUSCULAR; INTRAVENOUS; SUBCUTANEOUS EVERY 12 HOURS
Refills: 0 | Status: DISCONTINUED | OUTPATIENT
Start: 2023-10-31 | End: 2023-11-01

## 2023-10-31 RX ORDER — INSULIN LISPRO 100/ML
15 VIAL (ML) SUBCUTANEOUS
Refills: 0 | Status: DISCONTINUED | OUTPATIENT
Start: 2023-10-31 | End: 2023-11-02

## 2023-10-31 RX ADMIN — Medication 2 MILLILITER(S): at 08:58

## 2023-10-31 RX ADMIN — SODIUM ZIRCONIUM CYCLOSILICATE 5 GRAM(S): 10 POWDER, FOR SUSPENSION ORAL at 08:59

## 2023-10-31 RX ADMIN — Medication 14 UNIT(S): at 09:02

## 2023-10-31 RX ADMIN — Medication 3 MILLILITER(S): at 08:58

## 2023-10-31 RX ADMIN — FAMOTIDINE 20 MILLIGRAM(S): 10 INJECTION INTRAVENOUS at 11:53

## 2023-10-31 RX ADMIN — Medication 3 MILLILITER(S): at 17:25

## 2023-10-31 RX ADMIN — BUDESONIDE AND FORMOTEROL FUMARATE DIHYDRATE 2 PUFF(S): 160; 4.5 AEROSOL RESPIRATORY (INHALATION) at 06:00

## 2023-10-31 RX ADMIN — MONTELUKAST 10 MILLIGRAM(S): 4 TABLET, CHEWABLE ORAL at 11:53

## 2023-10-31 RX ADMIN — INSULIN GLARGINE 24 UNIT(S): 100 INJECTION, SOLUTION SUBCUTANEOUS at 21:41

## 2023-10-31 RX ADMIN — Medication 3: at 17:58

## 2023-10-31 RX ADMIN — CLOPIDOGREL BISULFATE 75 MILLIGRAM(S): 75 TABLET, FILM COATED ORAL at 11:53

## 2023-10-31 RX ADMIN — ATORVASTATIN CALCIUM 20 MILLIGRAM(S): 80 TABLET, FILM COATED ORAL at 21:39

## 2023-10-31 RX ADMIN — Medication 1: at 09:04

## 2023-10-31 RX ADMIN — GABAPENTIN 100 MILLIGRAM(S): 400 CAPSULE ORAL at 23:58

## 2023-10-31 RX ADMIN — SODIUM ZIRCONIUM CYCLOSILICATE 5 GRAM(S): 10 POWDER, FOR SUSPENSION ORAL at 17:25

## 2023-10-31 RX ADMIN — ERTAPENEM SODIUM 120 MILLIGRAM(S): 1 INJECTION, POWDER, LYOPHILIZED, FOR SOLUTION INTRAMUSCULAR; INTRAVENOUS at 17:26

## 2023-10-31 RX ADMIN — Medication 4: at 13:00

## 2023-10-31 RX ADMIN — MEXILETINE HYDROCHLORIDE 200 MILLIGRAM(S): 150 CAPSULE ORAL at 09:04

## 2023-10-31 RX ADMIN — SODIUM CHLORIDE 4 MILLILITER(S): 9 INJECTION INTRAMUSCULAR; INTRAVENOUS; SUBCUTANEOUS at 08:58

## 2023-10-31 RX ADMIN — Medication 3 MILLILITER(S): at 11:54

## 2023-10-31 RX ADMIN — MEXILETINE HYDROCHLORIDE 200 MILLIGRAM(S): 150 CAPSULE ORAL at 17:24

## 2023-10-31 RX ADMIN — MEXILETINE HYDROCHLORIDE 200 MILLIGRAM(S): 150 CAPSULE ORAL at 23:56

## 2023-10-31 RX ADMIN — BUDESONIDE AND FORMOTEROL FUMARATE DIHYDRATE 2 PUFF(S): 160; 4.5 AEROSOL RESPIRATORY (INHALATION) at 17:30

## 2023-10-31 RX ADMIN — Medication 600 MILLIGRAM(S): at 17:24

## 2023-10-31 RX ADMIN — PANTOPRAZOLE SODIUM 40 MILLIGRAM(S): 20 TABLET, DELAYED RELEASE ORAL at 05:57

## 2023-10-31 RX ADMIN — Medication 50 MILLIGRAM(S): at 08:59

## 2023-10-31 RX ADMIN — Medication 14 UNIT(S): at 12:59

## 2023-10-31 RX ADMIN — SODIUM ZIRCONIUM CYCLOSILICATE 5 GRAM(S): 10 POWDER, FOR SUSPENSION ORAL at 21:40

## 2023-10-31 RX ADMIN — SENNA PLUS 2 TABLET(S): 8.6 TABLET ORAL at 21:38

## 2023-10-31 RX ADMIN — Medication 15 UNIT(S): at 17:59

## 2023-10-31 RX ADMIN — Medication 32 MILLIGRAM(S): at 05:58

## 2023-10-31 RX ADMIN — SODIUM CHLORIDE 4 MILLILITER(S): 9 INJECTION INTRAMUSCULAR; INTRAVENOUS; SUBCUTANEOUS at 17:25

## 2023-10-31 RX ADMIN — SODIUM ZIRCONIUM CYCLOSILICATE 5 GRAM(S): 10 POWDER, FOR SUSPENSION ORAL at 01:10

## 2023-10-31 RX ADMIN — Medication 600 MILLIGRAM(S): at 05:58

## 2023-10-31 RX ADMIN — WARFARIN SODIUM 3 MILLIGRAM(S): 2.5 TABLET ORAL at 21:39

## 2023-10-31 RX ADMIN — POLYETHYLENE GLYCOL 3350 17 GRAM(S): 17 POWDER, FOR SOLUTION ORAL at 11:55

## 2023-10-31 RX ADMIN — Medication 500 MILLIGRAM(S): at 11:53

## 2023-10-31 RX ADMIN — GABAPENTIN 100 MILLIGRAM(S): 400 CAPSULE ORAL at 08:59

## 2023-10-31 RX ADMIN — Medication 50 MILLIGRAM(S): at 16:51

## 2023-10-31 RX ADMIN — SODIUM CHLORIDE 4 MILLILITER(S): 9 INJECTION INTRAMUSCULAR; INTRAVENOUS; SUBCUTANEOUS at 11:55

## 2023-10-31 RX ADMIN — Medication 1 TABLET(S): at 11:53

## 2023-10-31 RX ADMIN — GABAPENTIN 100 MILLIGRAM(S): 400 CAPSULE ORAL at 17:24

## 2023-10-31 RX ADMIN — Medication 2 MILLILITER(S): at 14:07

## 2023-10-31 NOTE — PROGRESS NOTE ADULT - PROBLEM SELECTOR PLAN 2
- Lantus 20 U at bedtime  - Lispro w/ meals 14 U TID  - ISS - Lantus 24 U at bedtime  - Lispro w/ meals 15 U TID  - ISS

## 2023-10-31 NOTE — PROGRESS NOTE ADULT - PROBLEM SELECTOR PLAN 3
INR 3.2 , taking 7.5mg at home  - monitor daily INR, coumadin dosing based on that  - Continue with Plavix 75 mg PO daily   - Continue with Mexiletine 200 mg every 8 hours   - Continue with Atorvastatin 20 mg PO HS  - Reached out to patient's cardiologist if patient can be on DOAC INR 3.2 , taking 7.5mg at home  - monitor daily INR, coumadin dosing based on that  - Continue with Plavix 75 mg PO daily   - Continue with Mexiletine 200 mg every 8 hours   - Continue with Atorvastatin 20 mg PO HS

## 2023-10-31 NOTE — PROGRESS NOTE ADULT - PROBLEM SELECTOR PLAN 6
Previously positive 9/29 , treated with remdesivir at that time , low suspicion for true infection , however patient is immunocompromised will therefore maintain on contact isolation until further evaluated by ID , not hypoxic, no indication for further treatment at this time   - isolation precautions   - ID consult

## 2023-10-31 NOTE — CONSULT NOTE ADULT - ATTENDING COMMENTS
73 F PMH asthma on steroids, bronchiectasis, allergic rhinitis, recurrent pneumonia, tracheomalacia s/p tracheoplasty, Diabetes, AF on Coumadin, colorectal cancer s/p colostomy, AVR 2022 and s/p TAVR - [facility] in September 2023, recently admitted September to Oct 2023 for bronchiectasis flare, Shingles, Covid-19 s/p Remdesivir in Sept, who now presents to the ED with c/o gait dysfunction  No fevers, no leukocytosis  AMS 2 days, wobbly gait  CXR clear  RVP COVID  UA neg  Likely shedding COVID  Overall, COVID, AMS  - Monitor off abx/antiviral  - Isolation precautions per infection control  - Care/eval for gait per neurology    Pepito Valladares MD  Contact on TEAMS messaging from 9am - 5pm  From 5pm-9am, on weekends, or if no response call [phone]    I was physically present for the key portions of the evaluation and management service provided. I saw and examined the patient. I agree with the above history, physical, and plan except for any discrepancies which I have documented in “Attending Statements.” Please refer to “Attending Statements” for final plan.
72 female with severe persistent asthma, allergic rhinitis, TBM s/p tracheoplasty, and bronchiectasis presented with AMS and unsteady gait. Patient complaining of productive cough worse than baseline. Pulmonary consulted for further management. Patient is afebrile. No leukocytosis in today's labs.     Monitor off antibiotics  Follow up with ID.   Follow up CT chest  Increase steroids to 32 mg methylprednisolone in event acute asthma exacerbation is playing a role. Will determine taper based on clinical response.  Airway clearance, bronchodilator therapy
HPI as per resident note, personally verified by me. Patient with admission for encephalopathy and dizziness in the setting of bronchiectasis/PNA. She also had word finding difficulty but this improved. She reports intermittent numbness of L foot as well as numbness/tingling of L hand 2nd digit. No other focal neurologic deficits reported. Patient's mentation is much improved today.    Neurologic exam as per resident note with additions as below:  AAO x3, speech fluent  CN's II-XII intact except for L eye prostheses (baseline)  Strength 5/5 all  Sens intact all. Noted (+) Tinel's at L wrist  FtN intact b/l, mild R > L intention tremor with mild postural component  Neutral b/l plantar response    B12 WNL, folate WNL, A1C 5.7%, UA (-), TSH/free T4 WNL, syphilis serology TP (-), Vitamin D dec 25.6    < from: MR Head No Cont (10.29.23 @ 16:52) >    Negative for recent infarct.  White matter microangiopathic change is mildly increased comparing to   2021 and there are scattered nonspecific microhemorrhages.    < end of copied text >      A/P:  Encephalopathy  Speech disturbance  Dizziness  DM type 2  Asthma  p Atrial fibrillation  Colorectal cancer s/p colectomy and ostomy  Skin sensation disturbance (L foot)  L carpal tunnel syndrome  Bronchiectasis /PNA  Vitamin D deficiency    - Etiology for encephalopathy is most consistent with toxic/metabolic process either due to acute medical issues, iatrogenic (ertapenem), or a combination thereof. MRI brain personally reviewed by me with extensive small vessel ischemic/periventricular white matter disease and microhemorrhages but no acute intracranial event. L hand numbness due to carpal tunnel syndrome and L foot numbness likely also secondary to peripheral nerve process. Seizures are on the differential but much lower. Mentation is much improved at this time  - vEEG to evaluate for focal slowing, epileptiform discharges, or seizures. Would cancel if mentation remains improved through tomorrow (11/2)  - Labs as per resident note  - Vitamin D levels low, would replete with D3 2000 Units PO daily for 3 months and then recheck new levels  - L nocturnal neutral wrist splint  - Continue to address above medical problems, as you are doing  - Will continue to follow patient with you

## 2023-10-31 NOTE — ADVANCED PRACTICE NURSE CONSULT - REASON FOR CONSULT
Midline Catheter Insertion Note    Catheter type: 4F  : Bard  Power injectable: Yes  LOT# [id number]                                                                                                                                                                                                                  Procedure assisted by:  HONEY St RN  Time out was preformed, confirming the patient's first and last name, date of birth, procedure, and correct site prior to state of procedure.    Patient was placed with HOB 30 degrees. Patient placement site was prepped with chlorhexidine solution, then draped using maximum sterile barrier protection. Using the Bard Site Rite 8, the catheter was placed using the Modified Seldinger Technique. Strict adherence to outline aseptic technique including handwashing, glove and gown, utilizing mask and cap, plus draping the patient with a sterile drape was observed. Upon completion of line placement, the insertion site was covered with a sterile occlusive CHG dressing. Pt tolerated procedure well.     All materials used for catheter insertion, including the intact guide wires, were accounted for at the end of the procedure.  Number of attempts: 1  Complications/Comments: None    Emergency Placement: No  Site: New  Anatomical Site of insertion: Left Brachial   Catheter size/length: 4F, 18 cm in/2 cm out/ total 20 cm  US guided Bard single lumen power midline placed  
Requested by staff to assess skin status of pt a/w a deep tissue injury. PMH is noted:  Patient is a 72 female with PMH of asthma on chronic steroids, bronchiectasis, allergic rhinitis,  recurrent PNA,  tracheomalacia s/p tracheoplasty, ESBL proteus infections (sputum),  diabetes, paroxysmal A-fib on coumadin, colorectal cancer many years ago status post colostomy, recent hospitalization at an outside hospital  for acute respiratory failure with hypoxia secondary to asthma/COPD exacerbation and bronchopneumonia 6/5/2023 - 6/16/2023), and AVR 2022 ( Dr. Cabrera)   s/p  TAVR- [facility] on 9/6 by Dr. Gonsalves and Dr. Leahy) , recently admitted (9/12- 10/01/23) for SOB, treated for bronchectasis flare, during course treated fo shingles , as well as covid infection; patient now presents for altered mental state for the past two days.   For the past two weeks, patient reports feeling unsteady on her feet and difficulty ambulating , needing assitance to walk. She reports no numbness or tingling and focal weakness. During this time she reports an ongoing cough with thick yellow-white sputum . She contacted her pulmonologist and was treated with a ten day course of doxycycline which was recently completed. On day prior to admission. patient became confused and "couldn't remember things" and became disoriented. She reports no fever or chills, cough remains unchanged. She has no chest pain and no shorntess of breath. No sick contacts.

## 2023-10-31 NOTE — CONSULT NOTE ADULT - ASSESSMENT
73y (1950) woman with a PMHx significant for asthma on chronic steroids, bronchiectasis, tracheomalacia s/p tracheoplasty, pAFib, colorectal cancer s/p colectomy and ostomy, presents with word finding difficulties and two days of AMS, now at baseline.    Impression:   Unclear etiology, R/O toxic/metabolic causes. Vit , Folate-12.3, Lactate 3.4, HgA1C:5.7, TSH-0.49, FT4-6.0, UA-neg, Hyperglycemic, CT head and MRI negative.    Recommendations:   Check Utox, ETOH level, RPR, antithyroglobulin Ab, TPO Ab, vitamin B1, B6, B12, folate, homocysteine, methylmalonic acid, lactate, creatnine kinase, ammonia, Cu, ceruloplasmin, SPEP, HIV, ESR, CRP, Zn    Case to be d/w neurology attending.     73y (1950) woman with a PMHx significant for asthma on chronic steroids, bronchiectasis, tracheomalacia s/p tracheoplasty, pAFib, colorectal cancer s/p colectomy and ostomy, presents with word finding difficulties and two days of AMS, now at baseline.    Impression:   Unclear etiology, R/O toxic/metabolic causes. Vit , Folate-12.3, Lactate 3.4, HgA1C:5.7, TSH-0.49, FT4-6.0, UA-neg, Hyperglycemic, CT head and MRI negative.    Recommendations:   Check  RPR, vitamin B1, B6, B12, ammonia, Cu, ESR, CRP, Zn    Case to be d/w neurology attending.     73y (1950) woman with a PMHx significant for asthma on chronic steroids, bronchiectasis, tracheomalacia s/p tracheoplasty, pAFib, colorectal cancer s/p colectomy and ostomy, presents with word finding difficulties and two days of AMS, now at baseline.    Impression:   Unclear etiology, R/O toxic/metabolic causes vs seizures. Vit , Folate-12.3, Lactate 3.4, HgA1C:5.7, TSH-0.49, FT4-6.0, UA-neg, Hyperglycemic, CT head and MRI negative.    Recommendations:   Check  RPR, vitamin B1, B6, B12, ammonia, Cu, ESR, CRP, Zn  Video EEG    Case to be d/w neurology attending.     73y (1950) woman with a PMHx significant for asthma on chronic steroids, bronchiectasis, tracheomalacia s/p tracheoplasty, pAFib, colorectal cancer s/p colectomy and ostomy, presents with word finding difficulties and two days of AMS, now at baseline.     Impression:   Unclear etiology, R/O toxic/metabolic causes vs subclinical seizures. No stroke on MRI brain.     Recommendations:   []Check  RPR, vitamin B1, B6, B12, ammonia, Cu, ESR, CRP, Zn, Vit , Folate-12.3, Lactate 3.4, HgA1C:5.7, TSH-0.49, FT4-6.0, UA-neg, Hyperglycemic, CT head and MRI negative.  []Video EEG    Case to be seen and d/w neurology attending.     73y (1950) woman with a PMHx significant for asthma on chronic steroids, DM, bronchiectasis, tracheomalacia s/p tracheoplasty, pAFib, colorectal cancer s/p colectomy and ostomy, s/p AVR (2022) and T[facility]R- [facility] on 9/6, recent shingles and covid infection, presents with word finding difficulties and s/p two days of AMS, now at baseline.    Impression:   Acute, new onset, expressive aphasia and unsteady gait. Unclear etiology, R/O toxic/metabolic causes vs subclinical seizures. No stroke on MRI brain.     Recommendations:   []Check vitamin B1, B6, B12, ammonia, Cu, ESR, CRP, Zn, Vit , Folate->20, Lactate 3.4, HgA1C:5.7, TSH-0.49, FT4-6.0, UA-neg, Hyperglycemic, RPR neg, CT head and MRI negative.  []Video EEG    Case to be seen and d/w neurology attending.     73y (1950) woman with a PMHx significant for asthma on chronic steroids, DM, bronchiectasis, tracheomalacia s/p tracheoplasty, pAFib, colorectal cancer s/p colectomy and ostomy, s/p AVR (2022) and T[facility]R- [facility] on 9/6, recent shingles and covid infection, presents with word finding difficulties and s/p two days of AMS, now at baseline.    Impression:   Acute, new onset, intermittent, expressive aphasia and unsteady gait. Unclear etiology, R/O toxic/metabolic causes vs subclinical seizures. No stroke on MRI brain.     Recommendations:   []Check vitamin B1, B6, B12, ammonia, Cu, ESR, CRP, Zn, Vit , Folate->20, Lactate 3.4, HgA1C:5.7, TSH-0.49, FT4-6.0, UA-neg, Hyperglycemic, RPR neg, CT head and MRI negative.  []Video EEG    Case to be seen and d/w neurology attending.

## 2023-10-31 NOTE — PROGRESS NOTE ADULT - PROBLEM SELECTOR PLAN 1
unsteady gait w/ episodic confusion, now at baseline mental state, no acute findings on CT Head ,no focal deficits on exam, mild leukocytosis but no other evidence for acute infection, hyperglycemic but no other majormetabolic derangements  - Neuro-checks   - MR brain ordered - no recent infarct, white matter microangiopathic change mildly increased from 2021, scattered nonspecific microhemorrhage.   - Outpatient neurology follow up   - fall precautions   - PT unsteady gait w/ episodic confusion, now at baseline mental state, no acute findings on CT Head ,no focal deficits on exam, mild leukocytosis but no other evidence for acute infection, hyperglycemic but no other majormetabolic derangements  - Neuro-checks   - MR brain ordered - no recent infarct, white matter microangiopathic change mildly increased from 2021, scattered nonspecific microhemorrhage.   - Neurology consult   - F/u B12, folate, RPR, vit D  - fall precautions   - PT

## 2023-10-31 NOTE — PROGRESS NOTE ADULT - PROBLEM SELECTOR PLAN 5
CT Chest - Mucoid impaction and atelectasis    - Pulmonary consult, appreciate recs  - Start airway clearance: humidified oxygen, duonebs q6h, 3% nebulized saline q6h, chest PT q8 h, aerobika/acapella q6 h, chest vest q12h  - titrate oxygen to O2 >88%, use humidified oxygen  - incentive spirometry, OOB to chair, PT/OT  - Sputum culture with ESBL proteus mirabilis   - C/w Ertapenem, PO benadryl 30 minutes prior to administration, likely 10-14 day course

## 2023-10-31 NOTE — PROGRESS NOTE ADULT - SUBJECTIVE AND OBJECTIVE BOX
DATE OF SERVICE: 10-31-23 @ 08:30    Patient is a 73y old  Female who presents with a chief complaint of ams x 2 days (30 Oct 2023 19:05)      SUBJECTIVE / OVERNIGHT EVENTS: Yesterday afternoon, patient hyperkalemic to 6, shifted with insulin/d50 and lokelma, repeat K at 4.6. Patient continues to endorse a productive cough which she states is yellow/green. She denies any chest pain or abdominal pain. Patient states she was started on coumadin in September by her CT surgeon. She was previously on Eliquis, but patient states CT surgeon recommended coumadin for 6 months before switching back.     MEDICATIONS  (STANDING):  acetylcysteine 10%  Inhalation 2 milliLiter(s) Inhalation every 8 hours  albuterol/ipratropium for Nebulization 3 milliLiter(s) Nebulizer every 6 hours  ascorbic acid 500 milliGRAM(s) Oral daily  atorvastatin 20 milliGRAM(s) Oral at bedtime  budesonide 160 MICROgram(s)/formoterol 4.5 MICROgram(s) Inhaler 2 Puff(s) Inhalation two times a day  clopidogrel Tablet 75 milliGRAM(s) Oral daily  dextrose 5%. 1000 milliLiter(s) (50 mL/Hr) IV Continuous <Continuous>  dextrose 5%. 1000 milliLiter(s) (100 mL/Hr) IV Continuous <Continuous>  dextrose 50% Injectable 12.5 Gram(s) IV Push once  dextrose 50% Injectable 25 Gram(s) IV Push once  ertapenem  IVPB      ertapenem  IVPB 1000 milliGRAM(s) IV Intermittent every 24 hours  famotidine    Tablet 20 milliGRAM(s) Oral daily  gabapentin 100 milliGRAM(s) Oral every 8 hours  glucagon  Injectable 1 milliGRAM(s) IntraMuscular once  guaiFENesin  milliGRAM(s) Oral every 12 hours  influenza  Vaccine (HIGH DOSE) 0.7 milliLiter(s) IntraMuscular once  insulin glargine Injectable (LANTUS) 20 Unit(s) SubCutaneous at bedtime  insulin lispro (ADMELOG) corrective regimen sliding scale   SubCutaneous three times a day before meals  insulin lispro (ADMELOG) corrective regimen sliding scale   SubCutaneous at bedtime  insulin lispro Injectable (ADMELOG) 14 Unit(s) SubCutaneous three times a day before meals  methylPREDNISolone 32 milliGRAM(s) Oral daily  mexiletine 200 milliGRAM(s) Oral every 8 hours  montelukast 10 milliGRAM(s) Oral daily  multivitamin 1 Tablet(s) Oral daily  pantoprazole    Tablet 40 milliGRAM(s) Oral before breakfast  polyethylene glycol 3350 17 Gram(s) Oral daily  senna 2 Tablet(s) Oral at bedtime  sodium chloride 3%  Inhalation 4 milliLiter(s) Inhalation every 6 hours  sodium zirconium cyclosilicate 5 Gram(s) Oral three times a day  tiotropium 2.5 MICROgram(s)/olodaterol 2.5 MICROgram(s) (STIOLTO) Inhaler 2 Puff(s) Inhalation daily    MEDICATIONS  (PRN):  acetaminophen     Tablet .. 650 milliGRAM(s) Oral every 6 hours PRN Temp greater or equal to 38C (100.4F), Mild Pain (1 - 3)  dextrose Oral Gel 15 Gram(s) Oral once PRN Blood Glucose LESS THAN 70 milliGRAM(s)/deciliter  diphenhydrAMINE 50 milliGRAM(s) Oral daily PRN Allergy symptoms  melatonin 3 milliGRAM(s) Oral at bedtime PRN Insomnia  ondansetron Injectable 4 milliGRAM(s) IV Push every 8 hours PRN Nausea and/or Vomiting      Vital Signs Last 24 Hrs  T(C): 36.8 (31 Oct 2023 04:54), Max: 37.1 (30 Oct 2023 13:03)  T(F): 98.3 (31 Oct 2023 04:54), Max: 98.8 (30 Oct 2023 13:03)  HR: 74 (31 Oct 2023 04:54) (74 - 83)  BP: 120/72 (31 Oct 2023 04:54) (120/72 - 158/72)  BP(mean): --  RR: 18 (31 Oct 2023 04:54) (18 - 18)  SpO2: 98% (31 Oct 2023 04:54) (96% - 98%)    Parameters below as of 31 Oct 2023 04:54  Patient On (Oxygen Delivery Method): room air      CAPILLARY BLOOD GLUCOSE      POCT Blood Glucose.: 292 mg/dL (30 Oct 2023 21:59)  POCT Blood Glucose.: 267 mg/dL (30 Oct 2023 17:42)  POCT Blood Glucose.: 200 mg/dL (30 Oct 2023 12:57)  POCT Blood Glucose.: 205 mg/dL (30 Oct 2023 08:31)    I&O's Summary    30 Oct 2023 07:01  -  31 Oct 2023 07:00  --------------------------------------------------------  IN: 720 mL / OUT: 1400 mL / NET: -680 mL        PHYSICAL EXAM:  GENERAL: NAD, well-developed  HEAD:  Atraumatic, Normocephalic  EYES: PERRLA  NECK: Supple, No JVD  CHEST/LUNG: Expiratory and inspiratory wheeze, productive cough, course breath sounds  HEART: Regular rate and rhythm  ABDOMEN: Soft, Nontender, Nondistended; colostomy bag in place  EXTREMITIES:  2+ Peripheral Pulses, No edema  NEUROLOGY: non-focal  SKIN: No rashes or lesions    LABS:    10-30    138  |  103  |  14  ----------------------------<  335<H>  4.6   |  23  |  0.61    Ca    9.3      30 Oct 2023 20:59    TPro  6.5  /  Alb  3.5  /  TBili  0.1<L>  /  DBili  x   /  AST  34  /  ALT  16  /  AlkPhos  87  10-30    PT/INR - ( 30 Oct 2023 17:48 )   PT: 22.9 sec;   INR: 2.23 ratio               Urinalysis Basic - ( 30 Oct 2023 20:59 )    Color: x / Appearance: x / SG: x / pH: x  Gluc: 335 mg/dL / Ketone: x  / Bili: x / Urobili: x   Blood: x / Protein: x / Nitrite: x   Leuk Esterase: x / RBC: x / WBC x   Sq Epi: x / Non Sq Epi: x / Bacteria: x        RADIOLOGY & ADDITIONAL TESTS:    Imaging Personally Reviewed:    Consultant(s) Notes Reviewed:      Care Discussed with Consultants/Other Providers:   DATE OF SERVICE: 10-31-23 @ 08:30    Patient is a 73y old  Female who presents with a chief complaint of ams x 2 days (30 Oct 2023 19:05)      SUBJECTIVE / OVERNIGHT EVENTS: Yesterday afternoon, patient hyperkalemic to 6, shifted with insulin/d50 and lokelma, repeat K at 4.6. Patient continues to endorse a productive cough which she states is yellow/green. She denies any chest pain or abdominal pain. Patient states she was started on coumadin in September by her CT surgeon. She was previously on Eliquis, but patient states CT surgeon recommended coumadin for 6 months before switching back. Patient will get midline today.     MEDICATIONS  (STANDING):  acetylcysteine 10%  Inhalation 2 milliLiter(s) Inhalation every 8 hours  albuterol/ipratropium for Nebulization 3 milliLiter(s) Nebulizer every 6 hours  ascorbic acid 500 milliGRAM(s) Oral daily  atorvastatin 20 milliGRAM(s) Oral at bedtime  budesonide 160 MICROgram(s)/formoterol 4.5 MICROgram(s) Inhaler 2 Puff(s) Inhalation two times a day  clopidogrel Tablet 75 milliGRAM(s) Oral daily  dextrose 5%. 1000 milliLiter(s) (50 mL/Hr) IV Continuous <Continuous>  dextrose 5%. 1000 milliLiter(s) (100 mL/Hr) IV Continuous <Continuous>  dextrose 50% Injectable 12.5 Gram(s) IV Push once  dextrose 50% Injectable 25 Gram(s) IV Push once  ertapenem  IVPB      ertapenem  IVPB 1000 milliGRAM(s) IV Intermittent every 24 hours  famotidine    Tablet 20 milliGRAM(s) Oral daily  gabapentin 100 milliGRAM(s) Oral every 8 hours  glucagon  Injectable 1 milliGRAM(s) IntraMuscular once  guaiFENesin  milliGRAM(s) Oral every 12 hours  influenza  Vaccine (HIGH DOSE) 0.7 milliLiter(s) IntraMuscular once  insulin glargine Injectable (LANTUS) 20 Unit(s) SubCutaneous at bedtime  insulin lispro (ADMELOG) corrective regimen sliding scale   SubCutaneous three times a day before meals  insulin lispro (ADMELOG) corrective regimen sliding scale   SubCutaneous at bedtime  insulin lispro Injectable (ADMELOG) 14 Unit(s) SubCutaneous three times a day before meals  methylPREDNISolone 32 milliGRAM(s) Oral daily  mexiletine 200 milliGRAM(s) Oral every 8 hours  montelukast 10 milliGRAM(s) Oral daily  multivitamin 1 Tablet(s) Oral daily  pantoprazole    Tablet 40 milliGRAM(s) Oral before breakfast  polyethylene glycol 3350 17 Gram(s) Oral daily  senna 2 Tablet(s) Oral at bedtime  sodium chloride 3%  Inhalation 4 milliLiter(s) Inhalation every 6 hours  sodium zirconium cyclosilicate 5 Gram(s) Oral three times a day  tiotropium 2.5 MICROgram(s)/olodaterol 2.5 MICROgram(s) (STIOLTO) Inhaler 2 Puff(s) Inhalation daily    MEDICATIONS  (PRN):  acetaminophen     Tablet .. 650 milliGRAM(s) Oral every 6 hours PRN Temp greater or equal to 38C (100.4F), Mild Pain (1 - 3)  dextrose Oral Gel 15 Gram(s) Oral once PRN Blood Glucose LESS THAN 70 milliGRAM(s)/deciliter  diphenhydrAMINE 50 milliGRAM(s) Oral daily PRN Allergy symptoms  melatonin 3 milliGRAM(s) Oral at bedtime PRN Insomnia  ondansetron Injectable 4 milliGRAM(s) IV Push every 8 hours PRN Nausea and/or Vomiting      Vital Signs Last 24 Hrs  T(C): 36.8 (31 Oct 2023 04:54), Max: 37.1 (30 Oct 2023 13:03)  T(F): 98.3 (31 Oct 2023 04:54), Max: 98.8 (30 Oct 2023 13:03)  HR: 74 (31 Oct 2023 04:54) (74 - 83)  BP: 120/72 (31 Oct 2023 04:54) (120/72 - 158/72)  BP(mean): --  RR: 18 (31 Oct 2023 04:54) (18 - 18)  SpO2: 98% (31 Oct 2023 04:54) (96% - 98%)    Parameters below as of 31 Oct 2023 04:54  Patient On (Oxygen Delivery Method): room air      CAPILLARY BLOOD GLUCOSE      POCT Blood Glucose.: 292 mg/dL (30 Oct 2023 21:59)  POCT Blood Glucose.: 267 mg/dL (30 Oct 2023 17:42)  POCT Blood Glucose.: 200 mg/dL (30 Oct 2023 12:57)  POCT Blood Glucose.: 205 mg/dL (30 Oct 2023 08:31)    I&O's Summary    30 Oct 2023 07:01  -  31 Oct 2023 07:00  --------------------------------------------------------  IN: 720 mL / OUT: 1400 mL / NET: -680 mL        PHYSICAL EXAM:  GENERAL: NAD, well-developed  HEAD:  Atraumatic, Normocephalic  EYES: PERRLA  NECK: Supple, No JVD  CHEST/LUNG: Expiratory and inspiratory wheeze, productive cough, course breath sounds  HEART: Regular rate and rhythm  ABDOMEN: Soft, Nontender, Nondistended; colostomy bag in place  EXTREMITIES:  2+ Peripheral Pulses, No edema  NEUROLOGY: non-focal  SKIN: No rashes or lesions    LABS:    10-30    138  |  103  |  14  ----------------------------<  335<H>  4.6   |  23  |  0.61    Ca    9.3      30 Oct 2023 20:59    TPro  6.5  /  Alb  3.5  /  TBili  0.1<L>  /  DBili  x   /  AST  34  /  ALT  16  /  AlkPhos  87  10-30    PT/INR - ( 30 Oct 2023 17:48 )   PT: 22.9 sec;   INR: 2.23 ratio               Urinalysis Basic - ( 30 Oct 2023 20:59 )    Color: x / Appearance: x / SG: x / pH: x  Gluc: 335 mg/dL / Ketone: x  / Bili: x / Urobili: x   Blood: x / Protein: x / Nitrite: x   Leuk Esterase: x / RBC: x / WBC x   Sq Epi: x / Non Sq Epi: x / Bacteria: x        RADIOLOGY & ADDITIONAL TESTS:    Imaging Personally Reviewed:    Consultant(s) Notes Reviewed:      Care Discussed with Consultants/Other Providers:

## 2023-10-31 NOTE — CONSULT NOTE ADULT - SUBJECTIVE AND OBJECTIVE BOX
Neurology - Consult Note    -  Spectra: [phone] ([facility]), [phone] ([facility])  -    HPI: Patient RAYRAY RODRIGUEZ is a 73y (1950) wo/man with a PMHx significant for ***      Review of Systems:  INCOMPLETE   CONSTITUTIONAL: No fevers or chills  EYES AND ENT: No visual changes or no throat pain   NECK: No pain or stiffness  RESPIRATORY: No hemoptysis or shortness of breath  CARDIOVASCULAR: No chest pain or palpitations  GASTROINTESTINAL: No melena or hematochezia  GENITOURINARY: No dysuria or hematuria  NEUROLOGICAL: +As stated in HPI above  SKIN: No itching, burning, rashes, or lesions   All other review of systems is negative unless indicated above.    Allergies:  penicillin (Anaphylaxis)  iodine (Short breath; Swelling)  tetanus toxoid (Short breath)  Avelox (Short breath; Pruritus)  aspirin (Short breath)  Dilaudid (Short breath)  codeine (Short breath)  Valium (Short breath)  shellfish (Anaphylaxis)  cefepime (Anaphylaxis)      PMHx/PSHx/Family Hx: As above, otherwise see below   Atrial fibrillation    Diabetes    Hypertension    COPD (chronic obstructive pulmonary disease)    Adrenal insufficiency    Aortic insufficiency    Pelvic fracture    Asthma    Hypertension    Tracheobronchomalacia    Colorectal cancer    Aortic disease    Rectal bleeding    Seizure    DVT (deep venous thrombosis)    TIA (transient ischemic attack)        Social Hx:  No current use of tobacco, alcohol, or illicit drugs  Lives with ***    Medications:  MEDICATIONS  (STANDING):  acetylcysteine 10%  Inhalation 2 milliLiter(s) Inhalation every 8 hours  albuterol/ipratropium for Nebulization 3 milliLiter(s) Nebulizer every 6 hours  ascorbic acid 500 milliGRAM(s) Oral daily  atorvastatin 20 milliGRAM(s) Oral at bedtime  budesonide 160 MICROgram(s)/formoterol 4.5 MICROgram(s) Inhaler 2 Puff(s) Inhalation two times a day  clopidogrel Tablet 75 milliGRAM(s) Oral daily  dextrose 5%. 1000 milliLiter(s) (100 mL/Hr) IV Continuous <Continuous>  dextrose 5%. 1000 milliLiter(s) (50 mL/Hr) IV Continuous <Continuous>  dextrose 50% Injectable 12.5 Gram(s) IV Push once  dextrose 50% Injectable 25 Gram(s) IV Push once  ertapenem  IVPB 1000 milliGRAM(s) IV Intermittent every 24 hours  ertapenem  IVPB      famotidine    Tablet 20 milliGRAM(s) Oral daily  gabapentin 100 milliGRAM(s) Oral every 8 hours  glucagon  Injectable 1 milliGRAM(s) IntraMuscular once  guaiFENesin  milliGRAM(s) Oral every 12 hours  influenza  Vaccine (HIGH DOSE) 0.7 milliLiter(s) IntraMuscular once  insulin glargine Injectable (LANTUS) 24 Unit(s) SubCutaneous at bedtime  insulin lispro (ADMELOG) corrective regimen sliding scale   SubCutaneous three times a day before meals  insulin lispro (ADMELOG) corrective regimen sliding scale   SubCutaneous at bedtime  insulin lispro Injectable (ADMELOG) 15 Unit(s) SubCutaneous three times a day before meals  methylPREDNISolone 32 milliGRAM(s) Oral daily  mexiletine 200 milliGRAM(s) Oral every 8 hours  montelukast 10 milliGRAM(s) Oral daily  multivitamin 1 Tablet(s) Oral daily  pantoprazole    Tablet 40 milliGRAM(s) Oral before breakfast  polyethylene glycol 3350 17 Gram(s) Oral daily  senna 2 Tablet(s) Oral at bedtime  sodium chloride 3%  Inhalation 4 milliLiter(s) Inhalation every 12 hours  sodium chloride 3%  Inhalation 4 milliLiter(s) Inhalation every 6 hours  sodium zirconium cyclosilicate 5 Gram(s) Oral three times a day  warfarin 3 milliGRAM(s) Oral at bedtime    MEDICATIONS  (PRN):  acetaminophen     Tablet .. 650 milliGRAM(s) Oral every 6 hours PRN Temp greater or equal to 38C (100.4F), Mild Pain (1 - 3)  dextrose Oral Gel 15 Gram(s) Oral once PRN Blood Glucose LESS THAN 70 milliGRAM(s)/deciliter  diphenhydrAMINE 50 milliGRAM(s) Oral daily PRN Allergy symptoms  melatonin 3 milliGRAM(s) Oral at bedtime PRN Insomnia  ondansetron Injectable 4 milliGRAM(s) IV Push every 8 hours PRN Nausea and/or Vomiting      Vitals:  T(C): 36.6 (10-31-23 @ 14:47), Max: 36.8 (10-31-23 @ 04:54)  HR: 90 (10-31-23 @ 14:47) (74 - 90)  BP: 135/56 (10-31-23 @ 14:47) (120/72 - 146/67)  RR: 18 (10-31-23 @ 14:47) (18 - 18)  SpO2: 97% (10-31-23 @ 14:47) (97% - 98%)    Physical Examination: INCOMPLETE  General - NAD  Cardiovascular - Peripheral pulses palpable, no edema  Eyes - Fundoscopy with flat, sharp optic discs and no hemorrhage or exudates; Fundoscopy not well visualized; Fundoscopy not performed due to safety precautions in the setting of the COVID-19 pandemic    Neurologic Exam:  Mental status - Awake, Alert, Oriented to person, place, and time. Speech fluent, repetition and naming intact. Follows simple and complex commands. Attention/concentration, recent and remote memory (including registration and recall), and fund of knowledge intact    Cranial nerves - PERRLA, VFF, EOMI, face sensation (V1-V3) intact b/l, facial strength intact without asymmetry b/l, hearing intact b/l, palate with symmetric elevation, trapezius OR sternocleidomastiod 5/5 strength b/l, tongue midline on protrusion with full lateral movement    Motor - Normal bulk and tone throughout. No pronator drift.  Strength testing            Deltoid      Biceps      Triceps     Wrist Extension    Wrist Flexion     Interossei         R            5                 5               5                     5                              5                        5                 5  L             5                 5               5                     5                              5                        5                 5              Hip Flexion    Hip Extension    Knee Flexion    Knee Extension    Dorsiflexion    Plantar Flexion  R              5                           5                       5                           5                            5                          5  L              5                           5                        5                           5                            5                          5    Sensation - Light touch/temperature OR pain/vibration intact throughout    DTR's -             Biceps      Triceps     Brachioradialis      Patellar    Ankle    Toes/plantar response  R             2+             2+                  2+                       2+            2+                 Down  L              2+             2+                 2+                        2+           2+                 Down    Coordination - Finger to Nose intact b/l. No tremors appreciated    Gait and station - Normal casual gait. Romberg (-)    Labs:    10-30    138  |  103  |  14  ----------------------------<  335<H>  4.6   |  23  |  0.61    Ca    9.3      30 Oct 2023 20:59    TPro  6.5  /  Alb  3.5  /  TBili  0.1<L>  /  DBili  x   /  AST  34  /  ALT  16  /  AlkPhos  87  10-30    CAPILLARY BLOOD GLUCOSE      POCT Blood Glucose.: 329 mg/dL (31 Oct 2023 12:59)    LIVER FUNCTIONS - ( 30 Oct 2023 17:15 )  Alb: 3.5 g/dL / Pro: 6.5 g/dL / ALK PHOS: 87 U/L / ALT: 16 U/L / AST: 34 U/L / GGT: x             PT/INR - ( 30 Oct 2023 17:48 )   PT: 22.9 sec;   INR: 2.23 ratio           CSF:                  Radiology:  MR Head No Cont:  (29 Oct 2023 16:52)  CT Head No Cont:  (27 Oct 2023 19:57)     Neurology - Consult Note    -  Spectra: [phone] ([facility]), [phone] ([facility])  -    HPI: Patient RAYRAY RODRIGUEZ is a 73y (1950) woman with a PMHx significant for asthma on chronic steroids, bronchiectasis, tracheomalacia s/p tracheoplasty, pAFib, colorectal cancer s/p colectomy and ostomy, presents with word finding difficulties and two days of AMS, now at baseline.   Per chart pt had episodic confusion with an unsteady gait, now back at baseline. Pt endorses difficulty with word finding for the past two weeks and feels unsteady on her feet. Denies difficulties with remembering names, appointments, or dates. Pt feels unsteady and worries about falls while walking around the house. Pt denies recent headaches, double vision, dizziness or lightheadedness. Endorses the sensation of "being rocked on a boat" at variable times during the day, not clearly positional. Endorses left foot numbness, denies burning or tingling.       Review of Systems:    NEUROLOGICAL: +As stated in HPI above  All other review of systems is negative unless indicated above.    Allergies:  penicillin (Anaphylaxis)  iodine (Short breath; Swelling)  tetanus toxoid (Short breath)  Avelox (Short breath; Pruritus)  aspirin (Short breath)  Dilaudid (Short breath)  codeine (Short breath)  Valium (Short breath)  shellfish (Anaphylaxis)  cefepime (Anaphylaxis)      PMHx/PSHx/Family Hx: As above, otherwise see below   Atrial fibrillation    Diabetes    Hypertension    COPD (chronic obstructive pulmonary disease)    Adrenal insufficiency    Aortic insufficiency    Pelvic fracture    Asthma    Hypertension    Tracheobronchomalacia    Colorectal cancer    Aortic disease    Rectal bleeding    Seizure    DVT (deep venous thrombosis)    TIA (transient ischemic attack)        Social Hx:  No current use of tobacco, alcohol, or illicit drugs  Lives with daughter  Retired attourney    Medications:  MEDICATIONS  (STANDING):  acetylcysteine 10%  Inhalation 2 milliLiter(s) Inhalation every 8 hours  albuterol/ipratropium for Nebulization 3 milliLiter(s) Nebulizer every 6 hours  ascorbic acid 500 milliGRAM(s) Oral daily  atorvastatin 20 milliGRAM(s) Oral at bedtime  budesonide 160 MICROgram(s)/formoterol 4.5 MICROgram(s) Inhaler 2 Puff(s) Inhalation two times a day  clopidogrel Tablet 75 milliGRAM(s) Oral daily  dextrose 5%. 1000 milliLiter(s) (100 mL/Hr) IV Continuous <Continuous>  dextrose 5%. 1000 milliLiter(s) (50 mL/Hr) IV Continuous <Continuous>  dextrose 50% Injectable 12.5 Gram(s) IV Push once  dextrose 50% Injectable 25 Gram(s) IV Push once  ertapenem  IVPB 1000 milliGRAM(s) IV Intermittent every 24 hours  ertapenem  IVPB      famotidine    Tablet 20 milliGRAM(s) Oral daily  gabapentin 100 milliGRAM(s) Oral every 8 hours  glucagon  Injectable 1 milliGRAM(s) IntraMuscular once  guaiFENesin  milliGRAM(s) Oral every 12 hours  influenza  Vaccine (HIGH DOSE) 0.7 milliLiter(s) IntraMuscular once  insulin glargine Injectable (LANTUS) 24 Unit(s) SubCutaneous at bedtime  insulin lispro (ADMELOG) corrective regimen sliding scale   SubCutaneous three times a day before meals  insulin lispro (ADMELOG) corrective regimen sliding scale   SubCutaneous at bedtime  insulin lispro Injectable (ADMELOG) 15 Unit(s) SubCutaneous three times a day before meals  methylPREDNISolone 32 milliGRAM(s) Oral daily  mexiletine 200 milliGRAM(s) Oral every 8 hours  montelukast 10 milliGRAM(s) Oral daily  multivitamin 1 Tablet(s) Oral daily  pantoprazole    Tablet 40 milliGRAM(s) Oral before breakfast  polyethylene glycol 3350 17 Gram(s) Oral daily  senna 2 Tablet(s) Oral at bedtime  sodium chloride 3%  Inhalation 4 milliLiter(s) Inhalation every 12 hours  sodium chloride 3%  Inhalation 4 milliLiter(s) Inhalation every 6 hours  sodium zirconium cyclosilicate 5 Gram(s) Oral three times a day  warfarin 3 milliGRAM(s) Oral at bedtime    MEDICATIONS  (PRN):  acetaminophen     Tablet .. 650 milliGRAM(s) Oral every 6 hours PRN Temp greater or equal to 38C (100.4F), Mild Pain (1 - 3)  dextrose Oral Gel 15 Gram(s) Oral once PRN Blood Glucose LESS THAN 70 milliGRAM(s)/deciliter  diphenhydrAMINE 50 milliGRAM(s) Oral daily PRN Allergy symptoms  melatonin 3 milliGRAM(s) Oral at bedtime PRN Insomnia  ondansetron Injectable 4 milliGRAM(s) IV Push every 8 hours PRN Nausea and/or Vomiting      Vitals:  T(C): 36.6 (10-31-23 @ 14:47), Max: 36.8 (10-31-23 @ 04:54)  HR: 90 (10-31-23 @ 14:47) (74 - 90)  BP: 135/56 (10-31-23 @ 14:47) (120/72 - 146/67)  RR: 18 (10-31-23 @ 14:47) (18 - 18)  SpO2: 97% (10-31-23 @ 14:47) (97% - 98%)    Physical Examination:   General - NAD  Cardiovascular - Peripheral pulses palpable, no edema  Eyes - Fundoscopy not performed due to safety precautions in the setting of the COVID-19 pandemic    Neurologic Exam:  Mental status - Awake, Alert, Oriented to person, place, and month. Not oriented to date or day of the week. Speech fluent, repetition and naming intact. Follows simple and complex commands. Attention/concentration (able to spell WORLD backwards), recent and remote memory (including registration and recall), and fund of knowledge intact    Cranial nerves - prosthetic left eye, R eye: RRLA, VFF, & EOMI, face sensation (V1-V3) intact b/l, facial strength intact without asymmetry b/l, hearing intact b/l, palate with symmetric elevation, trapezius 5/5 strength b/l, tongue midline on protrusion with full lateral movement    Motor - Normal bulk and tone throughout. No pronator drift.    Strength testing            Deltoid      Biceps      Triceps     Wrist Extension    Wrist Flexion     Interossei         R            5                 5               5                     5                              5                        5                 5  L             5                 5               5                     5                              5                        5                 5              Hip Flexion    Hip Extension    Knee Flexion    Knee Extension    Dorsiflexion    Plantar Flexion  R              4                           4                       5                           4                            4                          5  L              4                           4                        5                           4                            4                          5    Sensation - Light touch/temperature intact in upper extremities, decreased sensation to light touch in lower extremities bilaterally    DTR's -             Biceps      Triceps     Brachioradialis      Patellar    Ankle    Toes/plantar response  R             2+             2+                  2+                       0            0                 Down  L              2+             2+                 2+                        0            0                 Down    Coordination - Finger to Nose intact b/l. Fine positional tremor.     Gait and station - Normal casual gait.     Labs:    10-30    138  |  103  |  14  ----------------------------<  335<H>  4.6   |  23  |  0.61    Ca    9.3      30 Oct 2023 20:59    TPro  6.5  /  Alb  3.5  /  TBili  0.1<L>  /  DBili  x   /  AST  34  /  ALT  16  /  AlkPhos  87  10-30    CAPILLARY BLOOD GLUCOSE      POCT Blood Glucose.: 329 mg/dL (31 Oct 2023 12:59)    LIVER FUNCTIONS - ( 30 Oct 2023 17:15 )  Alb: 3.5 g/dL / Pro: 6.5 g/dL / ALK PHOS: 87 U/L / ALT: 16 U/L / AST: 34 U/L / GGT: x             PT/INR - ( 30 Oct 2023 17:48 )   PT: 22.9 sec;   INR: 2.23 ratio           CSF:                  Radiology:  MR Head No Cont:  (29 Oct 2023 16:52)  < from: MR Head No Cont (10.29.23 @ 16:52) >  IMPRESSION:    Negative for recent infarct.  White matter microangiopathic change is mildly increased comparing to   2021 and there are scattered nonspecific microhemorrhages.    < end of copied text >    CT Head No Cont:  (27 Oct 2023 19:57)  < from: CT Head No Cont (10.27.23 @ 19:57) >  FINDINGS:    No acute intracranial hemorrhage, midline shift, or mass effect.    Age-related involutional changes with mild bihemispheric chronic   microvascular changes. Intracranial atherosclerosis.    Status post functional endoscopic sinus surgery. The mastoid air cells   are clear. The right globe demonstrates proptosis with an ocular lens   replacement, similar to prior exam. Left ocular prosthesis.    The soft tissues of the scalp are unremarkable. The calvarium is intact.    Consider MRI of the brain as clinically warranted    IMPRESSION:    No acute intracranial hemorrhage, brain edema, or mass effect.    < end of copied text >     Neurology - Consult Note    -  Spectra: [phone] ([facility]), [phone] ([facility])  -    HPI: Patient RAYRAY RODRIGUEZ is a 73y (1950) woman with a PMHx significant for asthma on chronic steroids, bronchiectasis, tracheomalacia s/p tracheoplasty, pAFib, colorectal cancer s/p colectomy and ostomy, presents with word finding difficulties and two days of AMS, now at baseline.   Per chart pt had episodic confusion with an unsteady gait, now back at baseline. Pt alert and oriented to person, place, and month. Pt endorses difficulty with word finding for the past two weeks and feels unsteady on her feet. Denies difficulties with remembering names, appointments, or dates. Pt feels unsteady and worries about falls while walking around the house. Pt denies recent headaches, double vision, dizziness or lightheadedness. Endorses the sensation of "being rocked on a boat" at variable times during the day, not clearly positional. Endorses left foot numbness, denies burning or tingling.       Review of Systems:    NEUROLOGICAL: +As stated in HPI above  All other review of systems is negative unless indicated above.    Allergies:  penicillin (Anaphylaxis)  iodine (Short breath; Swelling)  tetanus toxoid (Short breath)  Avelox (Short breath; Pruritus)  aspirin (Short breath)  Dilaudid (Short breath)  codeine (Short breath)  Valium (Short breath)  shellfish (Anaphylaxis)  cefepime (Anaphylaxis)      PMHx/PSHx/Family Hx: As above, otherwise see below   Atrial fibrillation    Diabetes    Hypertension    COPD (chronic obstructive pulmonary disease)    Adrenal insufficiency    Aortic insufficiency    Pelvic fracture    Asthma    Hypertension    Tracheobronchomalacia    Colorectal cancer    Aortic disease    Rectal bleeding    Seizure    DVT (deep venous thrombosis)    TIA (transient ischemic attack)        Social Hx:  No current use of tobacco, alcohol, or illicit drugs  Lives with daughter  Retired attourney    Medications:  MEDICATIONS  (STANDING):  acetylcysteine 10%  Inhalation 2 milliLiter(s) Inhalation every 8 hours  albuterol/ipratropium for Nebulization 3 milliLiter(s) Nebulizer every 6 hours  ascorbic acid 500 milliGRAM(s) Oral daily  atorvastatin 20 milliGRAM(s) Oral at bedtime  budesonide 160 MICROgram(s)/formoterol 4.5 MICROgram(s) Inhaler 2 Puff(s) Inhalation two times a day  clopidogrel Tablet 75 milliGRAM(s) Oral daily  dextrose 5%. 1000 milliLiter(s) (100 mL/Hr) IV Continuous <Continuous>  dextrose 5%. 1000 milliLiter(s) (50 mL/Hr) IV Continuous <Continuous>  dextrose 50% Injectable 12.5 Gram(s) IV Push once  dextrose 50% Injectable 25 Gram(s) IV Push once  ertapenem  IVPB 1000 milliGRAM(s) IV Intermittent every 24 hours  ertapenem  IVPB      famotidine    Tablet 20 milliGRAM(s) Oral daily  gabapentin 100 milliGRAM(s) Oral every 8 hours  glucagon  Injectable 1 milliGRAM(s) IntraMuscular once  guaiFENesin  milliGRAM(s) Oral every 12 hours  influenza  Vaccine (HIGH DOSE) 0.7 milliLiter(s) IntraMuscular once  insulin glargine Injectable (LANTUS) 24 Unit(s) SubCutaneous at bedtime  insulin lispro (ADMELOG) corrective regimen sliding scale   SubCutaneous three times a day before meals  insulin lispro (ADMELOG) corrective regimen sliding scale   SubCutaneous at bedtime  insulin lispro Injectable (ADMELOG) 15 Unit(s) SubCutaneous three times a day before meals  methylPREDNISolone 32 milliGRAM(s) Oral daily  mexiletine 200 milliGRAM(s) Oral every 8 hours  montelukast 10 milliGRAM(s) Oral daily  multivitamin 1 Tablet(s) Oral daily  pantoprazole    Tablet 40 milliGRAM(s) Oral before breakfast  polyethylene glycol 3350 17 Gram(s) Oral daily  senna 2 Tablet(s) Oral at bedtime  sodium chloride 3%  Inhalation 4 milliLiter(s) Inhalation every 12 hours  sodium chloride 3%  Inhalation 4 milliLiter(s) Inhalation every 6 hours  sodium zirconium cyclosilicate 5 Gram(s) Oral three times a day  warfarin 3 milliGRAM(s) Oral at bedtime    MEDICATIONS  (PRN):  acetaminophen     Tablet .. 650 milliGRAM(s) Oral every 6 hours PRN Temp greater or equal to 38C (100.4F), Mild Pain (1 - 3)  dextrose Oral Gel 15 Gram(s) Oral once PRN Blood Glucose LESS THAN 70 milliGRAM(s)/deciliter  diphenhydrAMINE 50 milliGRAM(s) Oral daily PRN Allergy symptoms  melatonin 3 milliGRAM(s) Oral at bedtime PRN Insomnia  ondansetron Injectable 4 milliGRAM(s) IV Push every 8 hours PRN Nausea and/or Vomiting      Vitals:  T(C): 36.6 (10-31-23 @ 14:47), Max: 36.8 (10-31-23 @ 04:54)  HR: 90 (10-31-23 @ 14:47) (74 - 90)  BP: 135/56 (10-31-23 @ 14:47) (120/72 - 146/67)  RR: 18 (10-31-23 @ 14:47) (18 - 18)  SpO2: 97% (10-31-23 @ 14:47) (97% - 98%)    Physical Examination:   General - NAD  Cardiovascular - Peripheral pulses palpable, no edema  Eyes - Fundoscopy not performed due to safety precautions in the setting of the COVID-19 pandemic    Neurologic Exam:  Mental status - Awake, Alert, Oriented to person, place, and month. Not oriented to date or day of the week. Speech fluent, repetition and naming intact. Follows simple and complex commands. Attention/concentration (able to spell WORLD backwards), recent and remote memory (including registration and recall), and fund of knowledge intact    Cranial nerves - prosthetic left eye, R eye: RRLA, VFF, & EOMI, face sensation (V1-V3) intact b/l, facial strength intact without asymmetry b/l, hearing intact b/l, palate with symmetric elevation, trapezius 5/5 strength b/l, tongue midline on protrusion with full lateral movement    Motor - Normal bulk and tone throughout. No pronator drift.    Strength testing            Deltoid      Biceps      Triceps     Wrist Extension    Wrist Flexion     Interossei         R            5                 5               5                     5                              5                        5                 5  L             5                 5               5                     5                              5                        5                 5              Hip Flexion    Hip Extension    Knee Flexion    Knee Extension    Dorsiflexion    Plantar Flexion  R              4                           4                       5                           4                            4                          5  L              4                           4                        5                           4                            4                          5    Sensation - Light touch/temperature intact in upper extremities, decreased sensation to light touch in lower extremities bilaterally    DTR's -             Biceps      Triceps     Brachioradialis      Patellar    Ankle    Toes/plantar response  R             2+             2+                  2+                       0            0                 Down  L              2+             2+                 2+                        0            0                 Down    Coordination - Finger to Nose intact b/l. Fine positional tremor.     Gait and station - Normal casual gait.     Labs:    10-30    138  |  103  |  14  ----------------------------<  335<H>  4.6   |  23  |  0.61    Ca    9.3      30 Oct 2023 20:59    TPro  6.5  /  Alb  3.5  /  TBili  0.1<L>  /  DBili  x   /  AST  34  /  ALT  16  /  AlkPhos  87  10-30    CAPILLARY BLOOD GLUCOSE      POCT Blood Glucose.: 329 mg/dL (31 Oct 2023 12:59)    LIVER FUNCTIONS - ( 30 Oct 2023 17:15 )  Alb: 3.5 g/dL / Pro: 6.5 g/dL / ALK PHOS: 87 U/L / ALT: 16 U/L / AST: 34 U/L / GGT: x             PT/INR - ( 30 Oct 2023 17:48 )   PT: 22.9 sec;   INR: 2.23 ratio           CSF:                  Radiology:  MR Head No Cont:  (29 Oct 2023 16:52)  < from: MR Head No Cont (10.29.23 @ 16:52) >  IMPRESSION:    Negative for recent infarct.  White matter microangiopathic change is mildly increased comparing to   2021 and there are scattered nonspecific microhemorrhages.    < end of copied text >    CT Head No Cont:  (27 Oct 2023 19:57)  < from: CT Head No Cont (10.27.23 @ 19:57) >  FINDINGS:    No acute intracranial hemorrhage, midline shift, or mass effect.    Age-related involutional changes with mild bihemispheric chronic   microvascular changes. Intracranial atherosclerosis.    Status post functional endoscopic sinus surgery. The mastoid air cells   are clear. The right globe demonstrates proptosis with an ocular lens   replacement, similar to prior exam. Left ocular prosthesis.    The soft tissues of the scalp are unremarkable. The calvarium is intact.    Consider MRI of the brain as clinically warranted    IMPRESSION:    No acute intracranial hemorrhage, brain edema, or mass effect.    < end of copied text >     Neurology - Consult Note    -  Spectra: [id number] ([facility]), [id number] ([facility])  -    HPI: Patient RAYRAY RODRIGUEZ is a 73y (1950) woman with a PMHx significant for asthma on chronic steroids, DM, bronchiectasis, tracheomalacia s/p tracheoplasty, pAFib, colorectal cancer s/p colectomy and ostomy, s/p AVR (2022) and TAVR- MERLIN 9 on 9/6, recent shingles and covid infection, presents with word finding difficulties and s/p two days of AMS, now at baseline.   Per chart pt had episodic confusion with an unsteady gait, now back at baseline. Pt alert and oriented to person, place, and month. Pt endorses difficulty with word finding for the past two weeks and feels unsteady on her feet. Denies difficulties with remembering names, appointments, or dates. Pt feels unsteady while walking, requires assistance, and worries about falls while walking around the house. Pt denies recent headaches, double vision, dizziness or lightheadedness. Endorses the sensation of "being rocked on a boat" at variable times during the day, not clearly positional. Endorses left foot numbness, denies burning or tingling.     During the hospitalization, pt seen by pulm and ID, found to have bronchiectasis exacerbation, sputum culture positive for ESBL proteus.     Review of Systems:    NEUROLOGICAL: +As stated in HPI above  All other review of systems is negative unless indicated above.    Allergies:  penicillin (Anaphylaxis)  iodine (Short breath; Swelling)  tetanus toxoid (Short breath)  Avelox (Short breath; Pruritus)  aspirin (Short breath)  Dilaudid (Short breath)  codeine (Short breath)  Valium (Short breath)  shellfish (Anaphylaxis)  cefepime (Anaphylaxis)      PMHx/PSHx/Family Hx: As above, otherwise see below   Atrial fibrillation    Diabetes    Hypertension    COPD (chronic obstructive pulmonary disease)    Adrenal insufficiency    Aortic insufficiency    Pelvic fracture    Asthma    Hypertension    Tracheobronchomalacia    Colorectal cancer    Aortic disease    Rectal bleeding    Seizure    DVT (deep venous thrombosis)    TIA (transient ischemic attack)        Social Hx:  No current use of tobacco, alcohol, or illicit drugs  Lives with daughter  Retired attourney    Medications:  MEDICATIONS  (STANDING):  acetylcysteine 10%  Inhalation 2 milliLiter(s) Inhalation every 8 hours  albuterol/ipratropium for Nebulization 3 milliLiter(s) Nebulizer every 6 hours  ascorbic acid 500 milliGRAM(s) Oral daily  atorvastatin 20 milliGRAM(s) Oral at bedtime  budesonide 160 MICROgram(s)/formoterol 4.5 MICROgram(s) Inhaler 2 Puff(s) Inhalation two times a day  clopidogrel Tablet 75 milliGRAM(s) Oral daily  dextrose 5%. 1000 milliLiter(s) (100 mL/Hr) IV Continuous <Continuous>  dextrose 5%. 1000 milliLiter(s) (50 mL/Hr) IV Continuous <Continuous>  dextrose 50% Injectable 12.5 Gram(s) IV Push once  dextrose 50% Injectable 25 Gram(s) IV Push once  ertapenem  IVPB 1000 milliGRAM(s) IV Intermittent every 24 hours  ertapenem  IVPB      famotidine    Tablet 20 milliGRAM(s) Oral daily  gabapentin 100 milliGRAM(s) Oral every 8 hours  glucagon  Injectable 1 milliGRAM(s) IntraMuscular once  guaiFENesin  milliGRAM(s) Oral every 12 hours  influenza  Vaccine (HIGH DOSE) 0.7 milliLiter(s) IntraMuscular once  insulin glargine Injectable (LANTUS) 24 Unit(s) SubCutaneous at bedtime  insulin lispro (ADMELOG) corrective regimen sliding scale   SubCutaneous three times a day before meals  insulin lispro (ADMELOG) corrective regimen sliding scale   SubCutaneous at bedtime  insulin lispro Injectable (ADMELOG) 15 Unit(s) SubCutaneous three times a day before meals  methylPREDNISolone 32 milliGRAM(s) Oral daily  mexiletine 200 milliGRAM(s) Oral every 8 hours  montelukast 10 milliGRAM(s) Oral daily  multivitamin 1 Tablet(s) Oral daily  pantoprazole    Tablet 40 milliGRAM(s) Oral before breakfast  polyethylene glycol 3350 17 Gram(s) Oral daily  senna 2 Tablet(s) Oral at bedtime  sodium chloride 3%  Inhalation 4 milliLiter(s) Inhalation every 12 hours  sodium chloride 3%  Inhalation 4 milliLiter(s) Inhalation every 6 hours  sodium zirconium cyclosilicate 5 Gram(s) Oral three times a day  warfarin 3 milliGRAM(s) Oral at bedtime    MEDICATIONS  (PRN):  acetaminophen     Tablet .. 650 milliGRAM(s) Oral every 6 hours PRN Temp greater or equal to 38C (100.4F), Mild Pain (1 - 3)  dextrose Oral Gel 15 Gram(s) Oral once PRN Blood Glucose LESS THAN 70 milliGRAM(s)/deciliter  diphenhydrAMINE 50 milliGRAM(s) Oral daily PRN Allergy symptoms  melatonin 3 milliGRAM(s) Oral at bedtime PRN Insomnia  ondansetron Injectable 4 milliGRAM(s) IV Push every 8 hours PRN Nausea and/or Vomiting      Vitals:  T(C): 36.6 (10-31-23 @ 14:47), Max: 36.8 (10-31-23 @ 04:54)  HR: 90 (10-31-23 @ 14:47) (74 - 90)  BP: 135/56 (10-31-23 @ 14:47) (120/72 - 146/67)  RR: 18 (10-31-23 @ 14:47) (18 - 18)  SpO2: 97% (10-31-23 @ 14:47) (97% - 98%)    Physical Examination:   General - NAD  Cardiovascular - Peripheral pulses palpable, no edema  Eyes - Fundoscopy not performed due to safety precautions in the setting of the COVID-19 pandemic    Neurologic Exam:  Mental status - Awake, Alert, Oriented to person, place, and month. Not oriented to date or day of the week. Speech fluent, repetition and naming intact. Follows simple and complex commands. Attention/concentration (able to spell WORLD backwards), recent and remote memory (including registration and recall), and fund of knowledge intact    Cranial nerves - prosthetic left eye, R eye: RRLA, VFF, & EOMI, face sensation (V1-V3) intact b/l, facial strength intact without asymmetry b/l, hearing intact b/l, palate with symmetric elevation, trapezius 5/5 strength b/l, tongue midline on protrusion with full lateral movement    Motor - Normal bulk and tone throughout. No pronator drift.    Strength testing            Deltoid      Biceps      Triceps     Wrist Extension    Wrist Flexion     Interossei         R            5                 5               5                     5                              5                        5                 5  L             5                 5               5                     5                              5                        5                 5              Hip Flexion    Hip Extension    Knee Flexion    Knee Extension    Dorsiflexion    Plantar Flexion  R              4                           4                       5                           4                            4                          5  L              4                           4                        5                           4                            4                          5    Sensation - Light touch/temperature intact in upper extremities, decreased sensation to light touch in lower extremities bilaterally    DTR's -             Biceps      Triceps     Brachioradialis      Patellar    Ankle    Toes/plantar response  R             2+             2+                  2+                       0            0                 Down  L              2+             2+                 2+                        0            0                 Down    Coordination - Finger to Nose intact b/l. Fine positional tremor.     Gait and station - Unsteady gait.     Labs:    10-30    138  |  103  |  14  ----------------------------<  335<H>  4.6   |  23  |  0.61    Ca    9.3      30 Oct 2023 20:59    TPro  6.5  /  Alb  3.5  /  TBili  0.1<L>  /  DBili  x   /  AST  34  /  ALT  16  /  AlkPhos  87  10-30    CAPILLARY BLOOD GLUCOSE      POCT Blood Glucose.: 329 mg/dL (31 Oct 2023 12:59)    LIVER FUNCTIONS - ( 30 Oct 2023 17:15 )  Alb: 3.5 g/dL / Pro: 6.5 g/dL / ALK PHOS: 87 U/L / ALT: 16 U/L / AST: 34 U/L / GGT: x             PT/INR - ( 30 Oct 2023 17:48 )   PT: 22.9 sec;   INR: 2.23 ratio           CSF:                  Radiology:  MR Head No Cont:  (29 Oct 2023 16:52)  < from: MR Head No Cont (10.29.23 @ 16:52) >  IMPRESSION:    Negative for recent infarct.  White matter microangiopathic change is mildly increased comparing to   2021 and there are scattered nonspecific microhemorrhages.    < end of copied text >    CT Head No Cont:  (27 Oct 2023 19:57)  < from: CT Head No Cont (10.27.23 @ 19:57) >  FINDINGS:    No acute intracranial hemorrhage, midline shift, or mass effect.    Age-related involutional changes with mild bihemispheric chronic   microvascular changes. Intracranial atherosclerosis.    Status post functional endoscopic sinus surgery. The mastoid air cells   are clear. The right globe demonstrates proptosis with an ocular lens   replacement, similar to prior exam. Left ocular prosthesis.    The soft tissues of the scalp are unremarkable. The calvarium is intact.    Consider MRI of the brain as clinically warranted    IMPRESSION:    No acute intracranial hemorrhage, brain edema, or mass effect.    < end of copied text >

## 2023-11-01 LAB
ALBUMIN SERPL ELPH-MCNC: 4.1 G/DL — SIGNIFICANT CHANGE UP (ref 3.3–5)
ALBUMIN SERPL ELPH-MCNC: 4.1 G/DL — SIGNIFICANT CHANGE UP (ref 3.3–5)
ALP SERPL-CCNC: 89 U/L — SIGNIFICANT CHANGE UP (ref 40–120)
ALP SERPL-CCNC: 89 U/L — SIGNIFICANT CHANGE UP (ref 40–120)
ALT FLD-CCNC: 18 U/L — SIGNIFICANT CHANGE UP (ref 10–45)
ALT FLD-CCNC: 18 U/L — SIGNIFICANT CHANGE UP (ref 10–45)
AMMONIA BLD-MCNC: <10 UMOL/L — LOW (ref 11–55)
AMMONIA BLD-MCNC: <10 UMOL/L — LOW (ref 11–55)
ANION GAP SERPL CALC-SCNC: 13 MMOL/L — SIGNIFICANT CHANGE UP (ref 5–17)
ANION GAP SERPL CALC-SCNC: 13 MMOL/L — SIGNIFICANT CHANGE UP (ref 5–17)
AST SERPL-CCNC: 24 U/L — SIGNIFICANT CHANGE UP (ref 10–40)
AST SERPL-CCNC: 24 U/L — SIGNIFICANT CHANGE UP (ref 10–40)
BASOPHILS # BLD AUTO: 0.02 K/UL — SIGNIFICANT CHANGE UP (ref 0–0.2)
BASOPHILS # BLD AUTO: 0.02 K/UL — SIGNIFICANT CHANGE UP (ref 0–0.2)
BASOPHILS NFR BLD AUTO: 0.2 % — SIGNIFICANT CHANGE UP (ref 0–2)
BASOPHILS NFR BLD AUTO: 0.2 % — SIGNIFICANT CHANGE UP (ref 0–2)
BILIRUB SERPL-MCNC: 0.1 MG/DL — LOW (ref 0.2–1.2)
BILIRUB SERPL-MCNC: 0.1 MG/DL — LOW (ref 0.2–1.2)
BUN SERPL-MCNC: 19 MG/DL — SIGNIFICANT CHANGE UP (ref 7–23)
BUN SERPL-MCNC: 19 MG/DL — SIGNIFICANT CHANGE UP (ref 7–23)
CALCIUM SERPL-MCNC: 9.6 MG/DL — SIGNIFICANT CHANGE UP (ref 8.4–10.5)
CALCIUM SERPL-MCNC: 9.6 MG/DL — SIGNIFICANT CHANGE UP (ref 8.4–10.5)
CHLORIDE SERPL-SCNC: 100 MMOL/L — SIGNIFICANT CHANGE UP (ref 96–108)
CHLORIDE SERPL-SCNC: 100 MMOL/L — SIGNIFICANT CHANGE UP (ref 96–108)
CO2 SERPL-SCNC: 27 MMOL/L — SIGNIFICANT CHANGE UP (ref 22–31)
CO2 SERPL-SCNC: 27 MMOL/L — SIGNIFICANT CHANGE UP (ref 22–31)
CREAT SERPL-MCNC: 0.68 MG/DL — SIGNIFICANT CHANGE UP (ref 0.5–1.3)
CREAT SERPL-MCNC: 0.68 MG/DL — SIGNIFICANT CHANGE UP (ref 0.5–1.3)
CRP SERPL-MCNC: 4 MG/L — SIGNIFICANT CHANGE UP (ref 0–4)
CRP SERPL-MCNC: 4 MG/L — SIGNIFICANT CHANGE UP (ref 0–4)
CULTURE RESULTS: SIGNIFICANT CHANGE UP
EGFR: 91 ML/MIN/1.73M2 — SIGNIFICANT CHANGE UP
EGFR: 91 ML/MIN/1.73M2 — SIGNIFICANT CHANGE UP
EOSINOPHIL # BLD AUTO: 0 K/UL — SIGNIFICANT CHANGE UP (ref 0–0.5)
EOSINOPHIL # BLD AUTO: 0 K/UL — SIGNIFICANT CHANGE UP (ref 0–0.5)
EOSINOPHIL NFR BLD AUTO: 0 % — SIGNIFICANT CHANGE UP (ref 0–6)
EOSINOPHIL NFR BLD AUTO: 0 % — SIGNIFICANT CHANGE UP (ref 0–6)
ERYTHROCYTE [SEDIMENTATION RATE] IN BLOOD: 50 MM/HR — HIGH (ref 0–20)
ERYTHROCYTE [SEDIMENTATION RATE] IN BLOOD: 50 MM/HR — HIGH (ref 0–20)
GLUCOSE BLDC GLUCOMTR-MCNC: 148 MG/DL — HIGH (ref 70–99)
GLUCOSE BLDC GLUCOMTR-MCNC: 148 MG/DL — HIGH (ref 70–99)
GLUCOSE BLDC GLUCOMTR-MCNC: 199 MG/DL — HIGH (ref 70–99)
GLUCOSE BLDC GLUCOMTR-MCNC: 199 MG/DL — HIGH (ref 70–99)
GLUCOSE BLDC GLUCOMTR-MCNC: 223 MG/DL — HIGH (ref 70–99)
GLUCOSE BLDC GLUCOMTR-MCNC: 223 MG/DL — HIGH (ref 70–99)
GLUCOSE BLDC GLUCOMTR-MCNC: 260 MG/DL — HIGH (ref 70–99)
GLUCOSE BLDC GLUCOMTR-MCNC: 260 MG/DL — HIGH (ref 70–99)
GLUCOSE SERPL-MCNC: 320 MG/DL — HIGH (ref 70–99)
GLUCOSE SERPL-MCNC: 320 MG/DL — HIGH (ref 70–99)
HCT VFR BLD CALC: 39.2 % — SIGNIFICANT CHANGE UP (ref 34.5–45)
HCT VFR BLD CALC: 39.2 % — SIGNIFICANT CHANGE UP (ref 34.5–45)
HGB BLD-MCNC: 11.1 G/DL — LOW (ref 11.5–15.5)
HGB BLD-MCNC: 11.1 G/DL — LOW (ref 11.5–15.5)
IMM GRANULOCYTES NFR BLD AUTO: 0.7 % — SIGNIFICANT CHANGE UP (ref 0–0.9)
IMM GRANULOCYTES NFR BLD AUTO: 0.7 % — SIGNIFICANT CHANGE UP (ref 0–0.9)
INR BLD: 2.33 RATIO — HIGH (ref 0.85–1.18)
INR BLD: 2.33 RATIO — HIGH (ref 0.85–1.18)
LYMPHOCYTES # BLD AUTO: 0.39 K/UL — LOW (ref 1–3.3)
LYMPHOCYTES # BLD AUTO: 0.39 K/UL — LOW (ref 1–3.3)
LYMPHOCYTES # BLD AUTO: 3.6 % — LOW (ref 13–44)
LYMPHOCYTES # BLD AUTO: 3.6 % — LOW (ref 13–44)
MAGNESIUM SERPL-MCNC: 2 MG/DL — SIGNIFICANT CHANGE UP (ref 1.6–2.6)
MAGNESIUM SERPL-MCNC: 2 MG/DL — SIGNIFICANT CHANGE UP (ref 1.6–2.6)
MCHC RBC-ENTMCNC: 20.6 PG — LOW (ref 27–34)
MCHC RBC-ENTMCNC: 20.6 PG — LOW (ref 27–34)
MCHC RBC-ENTMCNC: 28.3 GM/DL — LOW (ref 32–36)
MCHC RBC-ENTMCNC: 28.3 GM/DL — LOW (ref 32–36)
MCV RBC AUTO: 72.9 FL — LOW (ref 80–100)
MCV RBC AUTO: 72.9 FL — LOW (ref 80–100)
MONOCYTES # BLD AUTO: 0.33 K/UL — SIGNIFICANT CHANGE UP (ref 0–0.9)
MONOCYTES # BLD AUTO: 0.33 K/UL — SIGNIFICANT CHANGE UP (ref 0–0.9)
MONOCYTES NFR BLD AUTO: 3.1 % — SIGNIFICANT CHANGE UP (ref 2–14)
MONOCYTES NFR BLD AUTO: 3.1 % — SIGNIFICANT CHANGE UP (ref 2–14)
NEUTROPHILS # BLD AUTO: 9.93 K/UL — HIGH (ref 1.8–7.4)
NEUTROPHILS # BLD AUTO: 9.93 K/UL — HIGH (ref 1.8–7.4)
NEUTROPHILS NFR BLD AUTO: 92.4 % — HIGH (ref 43–77)
NEUTROPHILS NFR BLD AUTO: 92.4 % — HIGH (ref 43–77)
NRBC # BLD: 0 /100 WBCS — SIGNIFICANT CHANGE UP (ref 0–0)
NRBC # BLD: 0 /100 WBCS — SIGNIFICANT CHANGE UP (ref 0–0)
PHOSPHATE SERPL-MCNC: 2.3 MG/DL — LOW (ref 2.5–4.5)
PHOSPHATE SERPL-MCNC: 2.3 MG/DL — LOW (ref 2.5–4.5)
PLATELET # BLD AUTO: 417 K/UL — HIGH (ref 150–400)
PLATELET # BLD AUTO: 417 K/UL — HIGH (ref 150–400)
POTASSIUM SERPL-MCNC: 5 MMOL/L — SIGNIFICANT CHANGE UP (ref 3.5–5.3)
POTASSIUM SERPL-MCNC: 5 MMOL/L — SIGNIFICANT CHANGE UP (ref 3.5–5.3)
POTASSIUM SERPL-SCNC: 5 MMOL/L — SIGNIFICANT CHANGE UP (ref 3.5–5.3)
POTASSIUM SERPL-SCNC: 5 MMOL/L — SIGNIFICANT CHANGE UP (ref 3.5–5.3)
PROT SERPL-MCNC: 6.9 G/DL — SIGNIFICANT CHANGE UP (ref 6–8.3)
PROT SERPL-MCNC: 6.9 G/DL — SIGNIFICANT CHANGE UP (ref 6–8.3)
PROTHROM AB SERPL-ACNC: 25 SEC — HIGH (ref 9.5–13)
PROTHROM AB SERPL-ACNC: 25 SEC — HIGH (ref 9.5–13)
RBC # BLD: 5.38 M/UL — HIGH (ref 3.8–5.2)
RBC # BLD: 5.38 M/UL — HIGH (ref 3.8–5.2)
RBC # FLD: 22.4 % — HIGH (ref 10.3–14.5)
RBC # FLD: 22.4 % — HIGH (ref 10.3–14.5)
SODIUM SERPL-SCNC: 140 MMOL/L — SIGNIFICANT CHANGE UP (ref 135–145)
SODIUM SERPL-SCNC: 140 MMOL/L — SIGNIFICANT CHANGE UP (ref 135–145)
SPECIMEN SOURCE: SIGNIFICANT CHANGE UP
T PALLIDUM AB TITR SER: NEGATIVE — SIGNIFICANT CHANGE UP
T PALLIDUM AB TITR SER: NEGATIVE — SIGNIFICANT CHANGE UP
WBC # BLD: 10.74 K/UL — HIGH (ref 3.8–10.5)
WBC # BLD: 10.74 K/UL — HIGH (ref 3.8–10.5)
WBC # FLD AUTO: 10.74 K/UL — HIGH (ref 3.8–10.5)
WBC # FLD AUTO: 10.74 K/UL — HIGH (ref 3.8–10.5)

## 2023-11-01 PROCEDURE — 99232 SBSQ HOSP IP/OBS MODERATE 35: CPT | Mod: GC

## 2023-11-01 PROCEDURE — 99233 SBSQ HOSP IP/OBS HIGH 50: CPT

## 2023-11-01 PROCEDURE — 99223 1ST HOSP IP/OBS HIGH 75: CPT | Mod: GC

## 2023-11-01 PROCEDURE — 95720 EEG PHY/QHP EA INCR W/VEEG: CPT

## 2023-11-01 RX ORDER — LACTULOSE 10 G/15ML
10 SOLUTION ORAL
Refills: 0 | Status: DISCONTINUED | OUTPATIENT
Start: 2023-11-01 | End: 2023-11-02

## 2023-11-01 RX ORDER — WARFARIN SODIUM 2.5 MG/1
3 TABLET ORAL ONCE
Refills: 0 | Status: COMPLETED | OUTPATIENT
Start: 2023-11-01 | End: 2023-11-01

## 2023-11-01 RX ORDER — ERGOCALCIFEROL 1.25 MG/1
50000 CAPSULE ORAL
Refills: 0 | Status: DISCONTINUED | OUTPATIENT
Start: 2023-11-01 | End: 2023-11-05

## 2023-11-01 RX ADMIN — SENNA PLUS 2 TABLET(S): 8.6 TABLET ORAL at 22:09

## 2023-11-01 RX ADMIN — Medication 3 MILLILITER(S): at 23:39

## 2023-11-01 RX ADMIN — Medication 600 MILLIGRAM(S): at 17:35

## 2023-11-01 RX ADMIN — Medication 1 TABLET(S): at 12:09

## 2023-11-01 RX ADMIN — MEXILETINE HYDROCHLORIDE 200 MILLIGRAM(S): 150 CAPSULE ORAL at 09:16

## 2023-11-01 RX ADMIN — BUDESONIDE AND FORMOTEROL FUMARATE DIHYDRATE 2 PUFF(S): 160; 4.5 AEROSOL RESPIRATORY (INHALATION) at 06:10

## 2023-11-01 RX ADMIN — Medication 3 MILLILITER(S): at 09:16

## 2023-11-01 RX ADMIN — SODIUM CHLORIDE 4 MILLILITER(S): 9 INJECTION INTRAMUSCULAR; INTRAVENOUS; SUBCUTANEOUS at 09:16

## 2023-11-01 RX ADMIN — INSULIN GLARGINE 24 UNIT(S): 100 INJECTION, SOLUTION SUBCUTANEOUS at 22:10

## 2023-11-01 RX ADMIN — GABAPENTIN 100 MILLIGRAM(S): 400 CAPSULE ORAL at 23:38

## 2023-11-01 RX ADMIN — CLOPIDOGREL BISULFATE 75 MILLIGRAM(S): 75 TABLET, FILM COATED ORAL at 12:09

## 2023-11-01 RX ADMIN — Medication 600 MILLIGRAM(S): at 06:08

## 2023-11-01 RX ADMIN — Medication 15 UNIT(S): at 09:17

## 2023-11-01 RX ADMIN — MEXILETINE HYDROCHLORIDE 200 MILLIGRAM(S): 150 CAPSULE ORAL at 17:36

## 2023-11-01 RX ADMIN — SODIUM CHLORIDE 4 MILLILITER(S): 9 INJECTION INTRAMUSCULAR; INTRAVENOUS; SUBCUTANEOUS at 23:39

## 2023-11-01 RX ADMIN — Medication 2 MILLILITER(S): at 14:59

## 2023-11-01 RX ADMIN — BUDESONIDE AND FORMOTEROL FUMARATE DIHYDRATE 2 PUFF(S): 160; 4.5 AEROSOL RESPIRATORY (INHALATION) at 17:39

## 2023-11-01 RX ADMIN — ATORVASTATIN CALCIUM 20 MILLIGRAM(S): 80 TABLET, FILM COATED ORAL at 22:13

## 2023-11-01 RX ADMIN — Medication 2: at 17:40

## 2023-11-01 RX ADMIN — PANTOPRAZOLE SODIUM 40 MILLIGRAM(S): 20 TABLET, DELAYED RELEASE ORAL at 06:09

## 2023-11-01 RX ADMIN — SODIUM CHLORIDE 4 MILLILITER(S): 9 INJECTION INTRAMUSCULAR; INTRAVENOUS; SUBCUTANEOUS at 17:36

## 2023-11-01 RX ADMIN — GABAPENTIN 100 MILLIGRAM(S): 400 CAPSULE ORAL at 17:36

## 2023-11-01 RX ADMIN — Medication 15 UNIT(S): at 17:40

## 2023-11-01 RX ADMIN — Medication 50 MILLIGRAM(S): at 09:20

## 2023-11-01 RX ADMIN — Medication 1: at 22:11

## 2023-11-01 RX ADMIN — Medication 2 MILLILITER(S): at 22:12

## 2023-11-01 RX ADMIN — POLYETHYLENE GLYCOL 3350 17 GRAM(S): 17 POWDER, FOR SOLUTION ORAL at 12:10

## 2023-11-01 RX ADMIN — GABAPENTIN 100 MILLIGRAM(S): 400 CAPSULE ORAL at 09:18

## 2023-11-01 RX ADMIN — Medication 1: at 12:11

## 2023-11-01 RX ADMIN — FAMOTIDINE 20 MILLIGRAM(S): 10 INJECTION INTRAVENOUS at 12:08

## 2023-11-01 RX ADMIN — MONTELUKAST 10 MILLIGRAM(S): 4 TABLET, CHEWABLE ORAL at 12:10

## 2023-11-01 RX ADMIN — LACTULOSE 10 GRAM(S): 10 SOLUTION ORAL at 17:36

## 2023-11-01 RX ADMIN — WARFARIN SODIUM 3 MILLIGRAM(S): 2.5 TABLET ORAL at 22:10

## 2023-11-01 RX ADMIN — ERTAPENEM SODIUM 120 MILLIGRAM(S): 1 INJECTION, POWDER, LYOPHILIZED, FOR SOLUTION INTRAMUSCULAR; INTRAVENOUS at 10:33

## 2023-11-01 RX ADMIN — Medication 2 MILLILITER(S): at 09:16

## 2023-11-01 RX ADMIN — Medication 32 MILLIGRAM(S): at 06:10

## 2023-11-01 RX ADMIN — MEXILETINE HYDROCHLORIDE 200 MILLIGRAM(S): 150 CAPSULE ORAL at 23:37

## 2023-11-01 RX ADMIN — Medication 3 MILLILITER(S): at 12:10

## 2023-11-01 RX ADMIN — SODIUM ZIRCONIUM CYCLOSILICATE 5 GRAM(S): 10 POWDER, FOR SUSPENSION ORAL at 06:08

## 2023-11-01 RX ADMIN — Medication 15 UNIT(S): at 12:11

## 2023-11-01 RX ADMIN — Medication 3 MILLILITER(S): at 17:36

## 2023-11-01 RX ADMIN — SODIUM CHLORIDE 4 MILLILITER(S): 9 INJECTION INTRAMUSCULAR; INTRAVENOUS; SUBCUTANEOUS at 12:10

## 2023-11-01 RX ADMIN — Medication 500 MILLIGRAM(S): at 12:09

## 2023-11-01 RX ADMIN — ERGOCALCIFEROL 50000 UNIT(S): 1.25 CAPSULE ORAL at 12:10

## 2023-11-01 NOTE — PROGRESS NOTE ADULT - SUBJECTIVE AND OBJECTIVE BOX
DATE OF SERVICE: 11-01-23 @ 12:59    Patient is a 73y old  Female who presents with a chief complaint of ams x 2 days (31 Oct 2023 15:29)      SUBJECTIVE / OVERNIGHT EVENTS: No acute events overnight, patient seen and examined at bedside. She reports subjective improvement in her cough, sputum production and breathing since yesterday. Patient endorses using acapella device. Patient states she has not had a bowel movement in 2 days and requests lactulose.     MEDICATIONS  (STANDING):  acetylcysteine 10%  Inhalation 2 milliLiter(s) Inhalation every 8 hours  albuterol/ipratropium for Nebulization 3 milliLiter(s) Nebulizer every 6 hours  ascorbic acid 500 milliGRAM(s) Oral daily  atorvastatin 20 milliGRAM(s) Oral at bedtime  budesonide 160 MICROgram(s)/formoterol 4.5 MICROgram(s) Inhaler 2 Puff(s) Inhalation two times a day  clopidogrel Tablet 75 milliGRAM(s) Oral daily  dextrose 5%. 1000 milliLiter(s) (50 mL/Hr) IV Continuous <Continuous>  dextrose 5%. 1000 milliLiter(s) (100 mL/Hr) IV Continuous <Continuous>  dextrose 50% Injectable 12.5 Gram(s) IV Push once  dextrose 50% Injectable 25 Gram(s) IV Push once  ergocalciferol 47018 Unit(s) Oral every week  ertapenem  IVPB      ertapenem  IVPB 1000 milliGRAM(s) IV Intermittent every 24 hours  famotidine    Tablet 20 milliGRAM(s) Oral daily  gabapentin 100 milliGRAM(s) Oral every 8 hours  glucagon  Injectable 1 milliGRAM(s) IntraMuscular once  guaiFENesin  milliGRAM(s) Oral every 12 hours  influenza  Vaccine (HIGH DOSE) 0.7 milliLiter(s) IntraMuscular once  insulin glargine Injectable (LANTUS) 24 Unit(s) SubCutaneous at bedtime  insulin lispro (ADMELOG) corrective regimen sliding scale   SubCutaneous at bedtime  insulin lispro (ADMELOG) corrective regimen sliding scale   SubCutaneous three times a day before meals  insulin lispro Injectable (ADMELOG) 15 Unit(s) SubCutaneous three times a day before meals  lactulose Syrup 10 Gram(s) Oral two times a day  methylPREDNISolone 32 milliGRAM(s) Oral daily  mexiletine 200 milliGRAM(s) Oral every 8 hours  montelukast 10 milliGRAM(s) Oral daily  multivitamin 1 Tablet(s) Oral daily  pantoprazole    Tablet 40 milliGRAM(s) Oral before breakfast  polyethylene glycol 3350 17 Gram(s) Oral daily  senna 2 Tablet(s) Oral at bedtime  sodium chloride 3%  Inhalation 4 milliLiter(s) Inhalation every 6 hours    MEDICATIONS  (PRN):  acetaminophen     Tablet .. 650 milliGRAM(s) Oral every 6 hours PRN Temp greater or equal to 38C (100.4F), Mild Pain (1 - 3)  dextrose Oral Gel 15 Gram(s) Oral once PRN Blood Glucose LESS THAN 70 milliGRAM(s)/deciliter  diphenhydrAMINE 50 milliGRAM(s) Oral daily PRN Allergy symptoms  melatonin 3 milliGRAM(s) Oral at bedtime PRN Insomnia  ondansetron Injectable 4 milliGRAM(s) IV Push every 8 hours PRN Nausea and/or Vomiting      Vital Signs Last 24 Hrs  T(C): 36.6 (01 Nov 2023 12:18), Max: 37.3 (31 Oct 2023 21:19)  T(F): 97.9 (01 Nov 2023 12:18), Max: 99.1 (31 Oct 2023 21:19)  HR: 78 (01 Nov 2023 12:18) (73 - 90)  BP: 112/64 (01 Nov 2023 12:18) (112/64 - 142/77)  BP(mean): --  RR: 17 (01 Nov 2023 12:18) (17 - 18)  SpO2: 97% (01 Nov 2023 12:18) (97% - 98%)    Parameters below as of 01 Nov 2023 12:18  Patient On (Oxygen Delivery Method): room air      CAPILLARY BLOOD GLUCOSE      POCT Blood Glucose.: 199 mg/dL (01 Nov 2023 12:07)  POCT Blood Glucose.: 148 mg/dL (01 Nov 2023 08:35)  POCT Blood Glucose.: 161 mg/dL (31 Oct 2023 21:32)  POCT Blood Glucose.: 296 mg/dL (31 Oct 2023 17:45)    I&O's Summary    31 Oct 2023 07:01  -  01 Nov 2023 07:00  --------------------------------------------------------  IN: 0 mL / OUT: 300 mL / NET: -300 mL      PHYSICAL EXAM:  GENERAL: NAD, well-developed  HEAD:  Atraumatic, Normocephalic  EYES: PERRLA  NECK: Supple, No JVD  CHEST/LUNG: Expiratory and inspiratory wheeze, productive cough, course breath sounds  HEART: Regular rate and rhythm  ABDOMEN: Soft, Nontender, Nondistended; colostomy bag in place  EXTREMITIES:  2+ Peripheral Pulses, No edema  NEUROLOGY: non-focal  SKIN: No rashes or lesions    LABS:                        10.4   15.39 )-----------( 365      ( 31 Oct 2023 16:53 )             35.9     10-31    137  |  100  |  18  ----------------------------<  374<H>  4.8   |  23  |  0.65    Ca    9.2      31 Oct 2023 16:53    TPro  6.5  /  Alb  3.5  /  TBili  0.1<L>  /  DBili  x   /  AST  34  /  ALT  16  /  AlkPhos  87  10-30    PT/INR - ( 31 Oct 2023 16:53 )   PT: 21.5 sec;   INR: 1.99 ratio               Urinalysis Basic - ( 31 Oct 2023 16:53 )    Color: x / Appearance: x / SG: x / pH: x  Gluc: 374 mg/dL / Ketone: x  / Bili: x / Urobili: x   Blood: x / Protein: x / Nitrite: x   Leuk Esterase: x / RBC: x / WBC x   Sq Epi: x / Non Sq Epi: x / Bacteria: x        RADIOLOGY & ADDITIONAL TESTS:    Imaging Personally Reviewed:    Consultant(s) Notes Reviewed:      Care Discussed with Consultants/Other Providers:

## 2023-11-01 NOTE — CHART NOTE - NSCHARTNOTEFT_GEN_A_CORE
EEG preliminary read (not final) on the initial recording hour(s) = x 1     No seizures recorded.  Left frontotemporal sharp waves.    Final report to follow tomorrow morning after completion of study.    [facility] EEG Reading Room Ph#: [phone]  Epilepsy Answering Service after 5PM and before 8:30AM: Ph#: [phone]

## 2023-11-01 NOTE — PROGRESS NOTE ADULT - PROBLEM SELECTOR PLAN 3
INR 3.2 , taking 7.5mg at home  - monitor daily INR, coumadin dosing based on that  - Continue with Plavix 75 mg PO daily   - Continue with Mexiletine 200 mg every 8 hours   - Continue with Atorvastatin 20 mg PO HS

## 2023-11-01 NOTE — PROGRESS NOTE ADULT - PROBLEM SELECTOR PLAN 1
unsteady gait w/ episodic confusion, now at baseline mental state, no acute findings on CT Head ,no focal deficits on exam, mild leukocytosis but no other evidence for acute infection, hyperglycemic but no other majormetabolic derangements  - Neuro-checks   - MR brain ordered - no recent infarct, white matter microangiopathic change mildly increased from 2021, scattered nonspecific microhemorrhage.   - Neurology consult - ordered vitamin B1, B6, ammonia, Cu, ESR, CRP, Zn. Video EEG also ordered.   - B12, folate, RPR wnl  - Vit D at 25, started on 50,000 Units weekly x8 weeks  - fall precautions   - PT

## 2023-11-01 NOTE — PROGRESS NOTE ADULT - SUBJECTIVE AND OBJECTIVE BOX
[facility] - Division of Pulmonary, Critical Care and Sleep Medicine   Please call [phone] between 8am-pm weekdays, [phone] after hours and weekends    Interval Events: no acute events overnight, no fevers or chills, no chest pain, improved dyspnea and wheezing    REVIEW OF SYSTEMS:  Constitutional: no fever, chills, fatigue  Neuro: no headache, numbness, weakness  Resp: improved dyspnea, cough, and wheezing  CVS: no chest pain, palpitations, leg swelling  GI: no abdominal pain, nausea, vomiting, diarrhea   : no dysuria, frequency, incontinence  Skin: no itching, burning, rashes, or lesions   Msk: no joint pain or swelling  Psych: no depression, anxiety  [x] All other systems negative    OBJECTIVE:  ICU Vital Signs Last 24 Hrs  T(C): 36.7 (01 Nov 2023 19:59), Max: 37.3 (31 Oct 2023 21:19)  T(F): 98 (01 Nov 2023 19:59), Max: 99.1 (31 Oct 2023 21:19)  HR: 91 (01 Nov 2023 19:59) (73 - 91)  BP: 144/60 (01 Nov 2023 19:59) (112/64 - 144/60)  RR: 18 (01 Nov 2023 19:59) (17 - 18)  SpO2: 96% (01 Nov 2023 19:59) (96% - 98%)    O2 Parameters below as of 01 Nov 2023 19:59  Patient On (Oxygen Delivery Method): room air      10-31 @ 07:01  -  11-01 @ 07:00  --------------------------------------------------------  IN: 0 mL / OUT: 300 mL / NET: -300 mL    CAPILLARY BLOOD GLUCOSE    POCT Blood Glucose.: 223 mg/dL (01 Nov 2023 17:19)    PHYSICAL EXAM:  Gen: NAD; AAOx3  Neuro: CN II-XII grossly intact; moving all extremities   HEENT: NC/AT; EOMI; MMM  CV: normal S1 & S2; regular rate and rhythm   Pulm: faint end expiratory wheezing bilaterally  GI: soft; NT/ND  Ext: no edema; pulses intact  Skin: warm, well perfused    HOSPITAL MEDICATIONS:  MEDICATIONS  (STANDING):  acetylcysteine 10%  Inhalation 2 milliLiter(s) Inhalation every 8 hours  albuterol/ipratropium for Nebulization 3 milliLiter(s) Nebulizer every 6 hours  ascorbic acid 500 milliGRAM(s) Oral daily  atorvastatin 20 milliGRAM(s) Oral at bedtime  budesonide 160 MICROgram(s)/formoterol 4.5 MICROgram(s) Inhaler 2 Puff(s) Inhalation two times a day  clopidogrel Tablet 75 milliGRAM(s) Oral daily  ergocalciferol 55307 Unit(s) Oral every week  ertapenem  IVPB 1000 milliGRAM(s) IV Intermittent every 24 hours  famotidine    Tablet 20 milliGRAM(s) Oral daily  gabapentin 100 milliGRAM(s) Oral every 8 hours  glucagon  Injectable 1 milliGRAM(s) IntraMuscular once  guaiFENesin  milliGRAM(s) Oral every 12 hours  influenza  Vaccine (HIGH DOSE) 0.7 milliLiter(s) IntraMuscular once  insulin glargine Injectable (LANTUS) 24 Unit(s) SubCutaneous at bedtime  insulin lispro (ADMELOG) corrective regimen sliding scale   SubCutaneous at bedtime  insulin lispro (ADMELOG) corrective regimen sliding scale   SubCutaneous three times a day before meals  insulin lispro Injectable (ADMELOG) 15 Unit(s) SubCutaneous three times a day before meals  lactulose Syrup 10 Gram(s) Oral two times a day  methylPREDNISolone 32 milliGRAM(s) Oral daily  mexiletine 200 milliGRAM(s) Oral every 8 hours  montelukast 10 milliGRAM(s) Oral daily  multivitamin 1 Tablet(s) Oral daily  pantoprazole    Tablet 40 milliGRAM(s) Oral before breakfast  polyethylene glycol 3350 17 Gram(s) Oral daily  senna 2 Tablet(s) Oral at bedtime  sodium chloride 3%  Inhalation 4 milliLiter(s) Inhalation every 6 hours  warfarin 3 milliGRAM(s) Oral once    MEDICATIONS  (PRN):  acetaminophen     Tablet .. 650 milliGRAM(s) Oral every 6 hours PRN Temp greater or equal to 38C (100.4F), Mild Pain (1 - 3)  dextrose Oral Gel 15 Gram(s) Oral once PRN Blood Glucose LESS THAN 70 milliGRAM(s)/deciliter  diphenhydrAMINE 50 milliGRAM(s) Oral daily PRN Allergy symptoms  melatonin 3 milliGRAM(s) Oral at bedtime PRN Insomnia  ondansetron Injectable 4 milliGRAM(s) IV Push every 8 hours PRN Nausea and/or Vomiting      LABS:                        11.1   10.74 )-----------( 417      ( 01 Nov 2023 15:04 )             39.2     Hgb Trend: 11.1<--, 10.4<--, 9.9<--, 9.6<--, 10.5<--  11-01    140  |  100  |  19  ----------------------------<  320<H>  5.0   |  27  |  0.68    Ca    9.6      01 Nov 2023 15:04  Phos  2.3     11-01  Mg     2.0     11-01    TPro  6.9  /  Alb  4.1  /  TBili  0.1<L>  /  DBili  x   /  AST  24  /  ALT  18  /  AlkPhos  89  11-01    Creatinine Trend: 0.68<--, 0.65<--, 0.61<--, 0.57<--, 0.67<--, 0.54<--  PT/INR - ( 01 Nov 2023 15:04 )   PT: 25.0 sec;   INR: 2.33 ratio           Urinalysis Basic - ( 01 Nov 2023 15:04 )    Color: x / Appearance: x / SG: x / pH: x  Gluc: 320 mg/dL / Ketone: x  / Bili: x / Urobili: x   Blood: x / Protein: x / Nitrite: x   Leuk Esterase: x / RBC: x / WBC x   Sq Epi: x / Non Sq Epi: x / Bacteria: x

## 2023-11-01 NOTE — PROVIDER CONTACT NOTE (MEDICATION) - SITUATION
patient verbalized she is tired to take the nebulization. she wants to take it after eating breakfast

## 2023-11-01 NOTE — PROGRESS NOTE ADULT - ASSESSMENT
Body Location Override (Optional - Billing Will Still Be Based On Selected Body Map Location If Applicable): right inferior malar cheek 72F PMH severe persistent asthma on chronic steroids, bronchiectasis, history of recurrent pneumonia, tracheomalacia s/p tracheoplasty, DM2, A-Fib on warfarin, s/p AVR (2022) and TAVR (valve in valve) in Sept 2023, h/o colorectal cancer s/p colectomy and ostomy, and recent shingles and COVID-19 viral infection who presented with AMS, found to have worsening cough, sputum production, and wheezing consistent with asthma/bronchiectasis exacerbation. CT chest with tracheobronchial secretions and mucous plugging in the lower lobes. Sputum culture with ESBL Proteus mirabilis.    - Continue Ertapenem to complete 10-14 days of treatment  - Given improved wheezing, dyspnea, and cough, I think we can start to taper methylprednisolone. Currently on methylprednisolone 32 mg daily, would taper down to home dose of 8 mg daily by decreasing by 4 mg every 3 days  - Continue Symbicort 160-4.5 mcg 2 puffs twice daily, rinse after use  - Continue montelukast 10 mg daily  - Airway clearance therapy with . Discontinue Stiolto to avoid excess LABA/LAMA therapy. Continue montelukast. Airway clearance therapy with albuterol-ipratropium nebs, hypertonic saline nebs, Mucomyst nebs, incentive spirometry, chest PT, chest vest/acapella. Supplemental oxygen with NC@2 LPM PRN for goal SpO2>90%. Given history of peripheral eosinophilia and currently steroid-dependent, would consider treatment with biologic therapy (e.g., mepolizumab or benralizumab) as outpatient.     Mohs Case Number: [id number] Referring Physician (Optional): Dr. Bailey 72F PMH severe persistent asthma on chronic steroids, bronchiectasis, history of recurrent pneumonia, tracheomalacia s/p tracheoplasty, DM2, A-Fib on warfarin, s/p AVR (2022) and TAVR (valve in valve) in Sept 2023, h/o colorectal cancer s/p colectomy and ostomy, and recent shingles and COVID-19 viral infection who presented with AMS, found to have worsening cough, sputum production, and wheezing consistent with asthma/bronchiectasis exacerbation. CT chest with tracheobronchial secretions and mucous plugging in the lower lobes. Sputum culture with ESBL Proteus mirabilis.    - Continue Ertapenem to complete 10-14 days of treatment  - Given improved wheezing, dyspnea, and cough, I think we can start to taper methylprednisolone. Currently on methylprednisolone 32 mg daily, would taper down to home dose of 8 mg daily by decreasing by 4 mg every 3 days  - Continue Symbicort 160-4.5 mcg 2 puffs twice daily, rinse after use  - Continue montelukast 10 mg daily  - Airway clearance therapy with albuterol-ipratropium, hypertonic saline, and Mucomyst nebs  - Chest percussion therapy, including chest vest, acapella, and incentive spirometry  - Supplemental oxygen with NC@2 LPM PRN for goal SpO2>90%  - Given history of peripheral eosinophilia and currently steroid-dependent, would consider treatment with biologic therapy (e.g., mepolizumab or benralizumab) as outpatient  - Needs close outpatient follow-up with her pulmonologist Dr. Eulalio Allison as outpatient Consent Type: Consent 1 (Standard) Eye Shield Used: No Surgeon Performing Repair (Optional): Jamal Odonnell M.D. Initial Size Of Lesion: 0.8 Number Of Stages: 1 Primary Defect Length In Cm (Final Defect Size - Required For Flaps/Grafts): 1.6 Primary Defect Width In Cm (Final Defect Size - Required For Flaps/Grafts): 1.3 Repair Type: Complex Repair Oculoplastic Surgeon Procedure Text (A): After obtaining clear surgical margins the patient was sent to oculoplastics for surgical repair.  The patient understands they will receive post-surgical care and follow-up from the referring physician's office. Oculoplastic Surgeon Procedure Text (B): After obtaining clear surgical margins the patient was sent to oculoplastics for surgical repair.  The patient understands they will receive post-surgical care and follow-up from the referring physician's office. Otolaryngologist Procedure Text (A): After obtaining clear surgical margins the patient was sent to otolaryngology for surgical repair.  The patient understands they will receive post-surgical care and follow-up from the referring physician's office. Otolaryngologist Procedure Text (B): After obtaining clear surgical margins the patient was sent to otolaryngology for surgical repair.  The patient understands they will receive post-surgical care and follow-up from the referring physician's office. Plastic Surgeon Procedure Text (A): After obtaining clear surgical margins the patient was sent to plastics for surgical repair.  The patient understands they will receive post-surgical care and follow-up from the referring physician's office. Plastic Surgeon Procedure Text (B): After obtaining clear surgical margins the patient was sent to plastics for surgical repair.  The patient understands they will receive post-surgical care and follow-up from the referring physician's office. Mid-Level Procedure Text (A): After obtaining clear surgical margins the patient was sent to a mid-level provider for surgical repair.  The patient understands they will receive post-surgical care and follow-up from the mid-level provider. Mid-Level Procedure Text (B): After obtaining clear surgical margins the patient was sent to a mid-level provider for surgical repair.  The patient understands they will receive post-surgical care and follow-up from the mid-level provider. Provider Procedure Text (A): After obtaining clear surgical margins the defect was repaired by another provider. Asc Procedure Text (A): After obtaining clear surgical margins the patient was sent to an ASC for surgical repair.  The patient understands they will receive post-surgical care and follow-up from the ASC physician. Asc Procedure Text (B): After obtaining clear surgical margins the patient was sent to an ASC for surgical repair.  The patient understands they will receive post-surgical care and follow-up from the ASC physician. Suturegard Retention Suture: 2-0 Nylon Retention Suture Bite Size: 3 mm Length To Time In Minutes Device Was In Place: 10 Simple / Intermediate / Complex Repair - Final Wound Length In Cm: 3.1 Undermining Type: Entire Wound Debridement Text: The wound edges were debrided prior to proceeding with the closure to facilitate wound healing. Helical Rim Text: The closure involved the helical rim. Vermilion Border Text: The closure involved the vermilion border. Nostril Rim Text: The closure involved the nostril rim. Retention Suture Text: Retention sutures were placed to support the closure and prevent dehiscence. Secondary Defect Length In Cm (Required For Flaps): 0 Location Indication Override (Is Already Calculated Based On Selected Body Location): Area M Include Size Of Lesion In Location Indication Statement: Yes Area H Indication Text: Tumors in this location are included in Area H (eyelids, eyebrows, nose, lips, chin, ear, pre-auricular, post-auricular, temple, genitalia, hands, feet, ankles and areola).  Tissue conservation is critical in these anatomic locations. Area M Indication Text: Tumors in this location are included in Area M (cheek, forehead, scalp, neck, jawline and pretibial skin).  Mohs surgery is indicated for tumors in these anatomic locations. Area L Indication Text: Tumors in this location are included in Area L (trunk and extremities).  Mohs surgery is indicated for larger tumors, or tumors with aggressive histologic features, in these anatomic locations. Special Stains Stage 1 - Results: Base On Clearance Noted Above Stage 2: Additional Anesthesia Type: 1% lidocaine with epinephrine Staging Info: By selecting yes to the question above you will include information on AJCC 8 tumor staging in your Mohs note. Information on tumor staging will be automatically added for SCCs on the head and neck. AJCC 8 includes tumor size, tumor depth, perineural involvement and bone invasion. Tumor Depth: Less than 6mm from granular layer and no invasion beyond the subcutaneous fat Was The Patient On Physician Recommended Anticoagulation Therapy?: Please Select the Appropriate Response Medical Necessity Statement: Based on my medical judgement, Mohs surgery is the most appropriate treatment for this cancer compared to other treatments. Alternatives Discussed Intro (Do Not Add Period): I discussed alternative treatments to Mohs surgery and specifically discussed the risks and benefits of Consent 1/Introductory Paragraph: The rationale for Mohs was explained to the patient and consent was obtained. The risks, benefits and alternatives to therapy were discussed in detail. Specifically, the risks of infection, scarring, bleeding, prolonged wound healing, incomplete removal, allergy to anesthesia, nerve injury and recurrence were addressed. Prior to the procedure, the treatment site was clearly identified and confirmed by the patient. All components of Universal Protocol/PAUSE Rule completed. Consent 2/Introductory Paragraph: Mohs surgery was explained to the patient and consent was obtained. The risks, benefits and alternatives to therapy were discussed in detail. Specifically, the risks of infection, scarring, bleeding, prolonged wound healing, incomplete removal, allergy to anesthesia, nerve injury and recurrence were addressed. Prior to the procedure, the treatment site was clearly identified and confirmed by the patient. All components of Universal Protocol/PAUSE Rule completed. Consent 3/Introductory Paragraph: I gave the patient a chance to ask questions they had about the procedure.  Following this I explained the Mohs procedure and consent was obtained. The risks, benefits and alternatives to therapy were discussed in detail. Specifically, the risks of infection, scarring, bleeding, prolonged wound healing, incomplete removal, allergy to anesthesia, nerve injury and recurrence were addressed. Prior to the procedure, the treatment site was clearly identified and confirmed by the patient. All components of Universal Protocol/PAUSE Rule completed. Consent (Temporal Branch)/Introductory Paragraph: The rationale for Mohs was explained to the patient and consent was obtained. The risks, benefits and alternatives to therapy were discussed in detail. Specifically, the risks of damage to the temporal branch of the facial nerve, infection, scarring, bleeding, prolonged wound healing, incomplete removal, allergy to anesthesia, and recurrence were addressed. Prior to the procedure, the treatment site was clearly identified and confirmed by the patient. All components of Universal Protocol/PAUSE Rule completed. Consent (Marginal Mandibular)/Introductory Paragraph: The rationale for Mohs was explained to the patient and consent was obtained. The risks, benefits and alternatives to therapy were discussed in detail. Specifically, the risks of damage to the marginal mandibular branch of the facial nerve, infection, scarring, bleeding, prolonged wound healing, incomplete removal, allergy to anesthesia, and recurrence were addressed. Prior to the procedure, the treatment site was clearly identified and confirmed by the patient. All components of Universal Protocol/PAUSE Rule completed. Consent (Spinal Accessory)/Introductory Paragraph: The rationale for Mohs was explained to the patient and consent was obtained. The risks, benefits and alternatives to therapy were discussed in detail. Specifically, the risks of damage to the spinal accessory nerve, infection, scarring, bleeding, prolonged wound healing, incomplete removal, allergy to anesthesia, and recurrence were addressed. Prior to the procedure, the treatment site was clearly identified and confirmed by the patient. All components of Universal Protocol/PAUSE Rule completed. Consent (Near Eyelid Margin)/Introductory Paragraph: The rationale for Mohs was explained to the patient and consent was obtained. The risks, benefits and alternatives to therapy were discussed in detail. Specifically, the risks of ectropion or eyelid deformity, infection, scarring, bleeding, prolonged wound healing, incomplete removal, allergy to anesthesia, nerve injury and recurrence were addressed. Prior to the procedure, the treatment site was clearly identified and confirmed by the patient. All components of Universal Protocol/PAUSE Rule completed. Consent (Ear)/Introductory Paragraph: The rationale for Mohs was explained to the patient and consent was obtained. The risks, benefits and alternatives to therapy were discussed in detail. Specifically, the risks of ear deformity, infection, scarring, bleeding, prolonged wound healing, incomplete removal, allergy to anesthesia, nerve injury and recurrence were addressed. Prior to the procedure, the treatment site was clearly identified and confirmed by the patient. All components of Universal Protocol/PAUSE Rule completed. Consent (Nose)/Introductory Paragraph: The rationale for Mohs was explained to the patient and consent was obtained. The risks, benefits and alternatives to therapy were discussed in detail. Specifically, the risks of nasal deformity, changes in the flow of air through the nose, infection, scarring, bleeding, prolonged wound healing, incomplete removal, allergy to anesthesia, nerve injury and recurrence were addressed. Prior to the procedure, the treatment site was clearly identified and confirmed by the patient. All components of Universal Protocol/PAUSE Rule completed. Consent (Lip)/Introductory Paragraph: The rationale for Mohs was explained to the patient and consent was obtained. The risks, benefits and alternatives to therapy were discussed in detail. Specifically, the risks of lip deformity, changes in the oral aperture, infection, scarring, bleeding, prolonged wound healing, incomplete removal, allergy to anesthesia, nerve injury and recurrence were addressed. Prior to the procedure, the treatment site was clearly identified and confirmed by the patient. All components of Universal Protocol/PAUSE Rule completed. Consent (Scalp)/Introductory Paragraph: The rationale for Mohs was explained to the patient and consent was obtained. The risks, benefits and alternatives to therapy were discussed in detail. Specifically, the risks of changes in hair growth pattern secondary to repair, infection, scarring, bleeding, prolonged wound healing, incomplete removal, allergy to anesthesia, nerve injury and recurrence were addressed. Prior to the procedure, the treatment site was clearly identified and confirmed by the patient. All components of Universal Protocol/PAUSE Rule completed. Detail Level: Detailed Postop Diagnosis: same Surgeon: Jamal Odonnell M.D. Anesthesia Type: 1% lidocaine with epinephrine and a 1:10 solution of 8.4% sodium bicarbonate Anesthesia Volume In Cc: 3 Additional Anesthesia Volume In Cc: 6 Hemostasis: Electrocoagulation Estimated Blood Loss (Cc): minimal Repair Anesthesia Method: local infiltration Anesthesia Volume In Cc: 4 Brow Lift Text: A midfrontal incision was made medially to the defect to allow access to the tissues just superior to the left eyebrow. Following careful dissection inferiorly in a supraperiosteal plane to the level of the left eyebrow, several 3-0 monocryl sutures were used to resuspend the eyebrow orbicularis oculi muscular unit to the superior frontal bone periosteum. This resulted in an appropriate reapproximation of static eyebrow symmetry and correction of the left brow ptosis. Deep Sutures: 4-0 Monocryl Epidermal Sutures: 5-0 Fast Absorbing Gut Epidermal Closure: running and interrupted Suturegard Intro: Intraoperative tissue expansion was performed, utilizing the SUTUREGARD device, in order to reduce wound tension. Suturegard Body: The suture ends were repeatedly re-tightened and re-clamped to achieve the desired tissue expansion. Hemigard Intro: Due to skin fragility and wound tension, it was decided to use HEMIGARD adhesive retention suture devices to permit a linear closure. The skin was cleaned and dried for a 6cm distance away from the wound. Excessive hair, if present, was removed to allow for adhesion. Hemigard Postcare Instructions: The HEMIGARD strips are to remain completely dry for at least 5-7 days. Donor Site Anesthesia Type: same as repair anesthesia Graft Donor Site Bandage (Optional-Leave Blank If You Don't Want In Note): A pressure bandage was applied to the donor site. Closure 2 Information: This tab is for additional flaps and grafts, including complex repair and grafts and complex repair and flaps. You can also specify a different location for the additional defect, if the location is the same you do not need to select a new one. We will insert the automated text for the repair you select below just as we do for solitary flaps and grafts. Please note that at this time if you select a location with a different insurance zone you will need to override the ICD10 and CPT if appropriate. Closure 3 Information: This tab is for additional flaps and grafts above and beyond our usual structured repairs.  Please note if you enter information here it will not currently bill and you will need to add the billing information manually. Closure 4 Information: This tab is for additional flaps and grafts above and beyond our usual structured repairs.  Please note if you enter information here it will not currently bill and you will need to add the billing information manually. Wound Care: Polysporin ointment Dressing: pressure dressing Wound Care (No Sutures): Petrolatum Dressing (No Sutures): dry sterile dressing Suture Removal: 7 days Unna Boot Text: An Unna boot was placed to help immobilize the limb and facilitate more rapid healing. Home Suture Removal Text: Patient was provided instructions on removing sutures and will remove their sutures at home.  If they have any questions or difficulties they will call the office. Post-Care Instructions: I reviewed with the patient in detail post-care instructions. Patient is not to engage in any heavy lifting, exercise, or swimming for the next 14 days. Should the patient develop any fevers, chills, bleeding, severe pain patient will contact the office immediately. Pain Refusal Text: I offered to prescribe pain medication but the patient refused to take this medication. Mauc Instructions: By selecting yes to the question below the MAUC number will be added into the note.  This will be calculated automatically based on the diagnosis chosen, the size entered, the body zone selected (H,M,L) and the specific indications you chose. You will also have the option to override the Mohs AUC if you disagree with the automatically calculated number and this option is found in the Case Summary tab. Where Do You Want The Question To Include Opioid Counseling Located?: Case Summary Tab Eye Protection Verbiage: Before proceeding with the stage, a plastic scleral shield was inserted. The globe was anesthetized with a few drops of 1% lidocaine with 1:100,000 epinephrine. Then, an appropriate sized scleral shield was chosen and coated with lacrilube ointment. The shield was gently inserted and left in place for the duration of each stage. After the stage was completed, the shield was gently removed. Mohs Method Verbiage: An incision at a 45 degree angle following the standard Mohs approach was done and the specimen was harvested as a microscopic controlled layer. Surgeon/Pathologist Verbiage (Will Incorporate Name Of Surgeon From Intro If Not Blank): operated in two distinct and integrated capacities as the surgeon and pathologist. Mohs Histo Method Verbiage: Each section was then chromacoded and processed in the Mohs lab using the Mohs protocol and submitted for frozen section.  All laboratory studies were performed in the [facility], [facility], [place] [address].  Special stains are not necessarily approved by the FDA but are used to improve diagnostic accuracy. Subsequent Stages Histo Method Verbiage: Using a similar technique to that described above, a thin layer of tissue was removed from all areas where tumor was visible on the previous stage.  The tissue was again oriented, mapped, dyed, and processed as above. Mohs Rapid Report Verbiage: The area of clinically evident tumor was marked with skin marking ink and appropriately hatched.  The initial incision was made following the Mohs approach through the skin.  The specimen was taken to the lab, divided into the necessary number of pieces, chromacoded and processed according to the Mohs protocol.  This was repeated in successive stages until a tumor free defect was achieved. Complex Repair Preamble Text (Leave Blank If You Do Not Want): Extensive wide undermining was performed. Intermediate Repair Preamble Text (Leave Blank If You Do Not Want): Undermining was performed with blunt dissection. Non-Graft Cartilage Fenestration Text: The cartilage was fenestrated with a 2mm punch biopsy to help facilitate healing. Graft Cartilage Fenestration Text: The cartilage was fenestrated with a 2mm punch biopsy to help facilitate graft survival and healing. Secondary Intention Text (Leave Blank If You Do Not Want): The defect will heal with secondary intention. No Repair - Repaired With Adjacent Surgical Defect Text (Leave Blank If You Do Not Want): After obtaining clear surgical margins the defect was repaired concurrently with another surgical defect which was in close approximation. Advancement Flap (Single) Text: The defect edges were debeveled with a #15 scalpel blade.  Given the location of the defect and the proximity to free margins a single advancement flap was deemed most appropriate.  Using a sterile surgical marker, an appropriate advancement flap was drawn incorporating the defect and placing the expected incisions within the relaxed skin tension lines where possible.    The area thus outlined was incised deep to adipose tissue with a #15 scalpel blade.  The skin margins were undermined to an appropriate distance in all directions utilizing iris scissors. Advancement Flap (Double) Text: The defect edges were debeveled with a #15 scalpel blade.  Given the location of the defect and the proximity to free margins a double advancement flap was deemed most appropriate.  Using a sterile surgical marker, the appropriate advancement flaps were drawn incorporating the defect and placing the expected incisions within the relaxed skin tension lines where possible.    The area thus outlined was incised deep to adipose tissue with a #15 scalpel blade.  The skin margins were undermined to an appropriate distance in all directions utilizing iris scissors. Burow's Advancement Flap Text: The defect edges were debeveled with a #15 scalpel blade.  Given the location of the defect and the proximity to free margins a Burow's advancement flap was deemed most appropriate.  Using a sterile surgical marker, the appropriate advancement flap was drawn incorporating the defect and placing the expected incisions within the relaxed skin tension lines where possible.    The area thus outlined was incised deep to adipose tissue with a #15 scalpel blade.  The skin margins were undermined to an appropriate distance in all directions utilizing iris scissors. Chonodrocutaneous Helical Advancement Flap Text: The defect edges were debeveled with a #15 scalpel blade.  Given the location of the defect and the proximity to free margins a chondrocutaneous helical advancement flap was deemed most appropriate.  Using a sterile surgical marker, the appropriate advancement flap was drawn incorporating the defect and placing the expected incisions within the relaxed skin tension lines where possible.    The area thus outlined was incised deep to adipose tissue with a #15 scalpel blade.  The skin margins were undermined to an appropriate distance in all directions utilizing iris scissors. Crescentic Advancement Flap Text: The defect edges were debeveled with a #15 scalpel blade.  Given the location of the defect and the proximity to free margins a crescentic advancement flap was deemed most appropriate.  Using a sterile surgical marker, the appropriate advancement flap was drawn incorporating the defect and placing the expected incisions within the relaxed skin tension lines where possible.    The area thus outlined was incised deep to adipose tissue with a #15 scalpel blade.  The skin margins were undermined to an appropriate distance in all directions utilizing iris scissors. A-T Advancement Flap Text: The defect edges were debeveled with a #15 scalpel blade.  Given the location of the defect, shape of the defect and the proximity to free margins an A-T advancement flap was deemed most appropriate.  Using a sterile surgical marker, an appropriate advancement flap was drawn incorporating the defect and placing the expected incisions within the relaxed skin tension lines where possible.    The area thus outlined was incised deep to adipose tissue with a #15 scalpel blade.  The skin margins were undermined to an appropriate distance in all directions utilizing iris scissors. O-T Advancement Flap Text: The defect edges were debeveled with a #15 scalpel blade.  Given the location of the defect, shape of the defect and the proximity to free margins an O-T advancement flap was deemed most appropriate.  Using a sterile surgical marker, an appropriate advancement flap was drawn incorporating the defect and placing the expected incisions within the relaxed skin tension lines where possible.    The area thus outlined was incised deep to adipose tissue with a #15 scalpel blade.  The skin margins were undermined to an appropriate distance in all directions utilizing iris scissors. O-L Flap Text: The defect edges were debeveled with a #15 scalpel blade.  Given the location of the defect, shape of the defect and the proximity to free margins an O-L flap was deemed most appropriate.  Using a sterile surgical marker, an appropriate advancement flap was drawn incorporating the defect and placing the expected incisions within the relaxed skin tension lines where possible.    The area thus outlined was incised deep to adipose tissue with a #15 scalpel blade.  The skin margins were undermined to an appropriate distance in all directions utilizing iris scissors. O-Z Flap Text: The defect edges were debeveled with a #15 scalpel blade.  Given the location of the defect, shape of the defect and the proximity to free margins an O-Z flap was deemed most appropriate.  Using a sterile surgical marker, an appropriate transposition flap was drawn incorporating the defect and placing the expected incisions within the relaxed skin tension lines where possible. The area thus outlined was incised deep to adipose tissue with a #15 scalpel blade.  The skin margins were undermined to an appropriate distance in all directions utilizing iris scissors. Double O-Z Flap Text: The defect edges were debeveled with a #15 scalpel blade.  Given the location of the defect, shape of the defect and the proximity to free margins a Double O-Z flap was deemed most appropriate.  Using a sterile surgical marker, an appropriate transposition flap was drawn incorporating the defect and placing the expected incisions within the relaxed skin tension lines where possible. The area thus outlined was incised deep to adipose tissue with a #15 scalpel blade.  The skin margins were undermined to an appropriate distance in all directions utilizing iris scissors. V-Y Flap Text: The defect edges were debeveled with a #15 scalpel blade.  Given the location of the defect, shape of the defect and the proximity to free margins a V-Y flap was deemed most appropriate.  Using a sterile surgical marker, an appropriate advancement flap was drawn incorporating the defect and placing the expected incisions within the relaxed skin tension lines where possible.    The area thus outlined was incised deep to adipose tissue with a #15 scalpel blade.  The skin margins were undermined to an appropriate distance in all directions utilizing iris scissors. Advancement-Rotation Flap Text: The defect edges were debeveled with a #15 scalpel blade.  Given the location of the defect, shape of the defect and the proximity to free margins an advancement-rotation flap was deemed most appropriate.  Using a sterile surgical marker, an appropriate flap was drawn incorporating the defect and placing the expected incisions within the relaxed skin tension lines where possible. The area thus outlined was incised deep to adipose tissue with a #15 scalpel blade.  The skin margins were undermined to an appropriate distance in all directions utilizing iris scissors. Mercedes Flap Text: The defect edges were debeveled with a #15 scalpel blade.  Given the location of the defect, shape of the defect and the proximity to free margins a Mercedes flap was deemed most appropriate.  Using a sterile surgical marker, an appropriate advancement flap was drawn incorporating the defect and placing the expected incisions within the relaxed skin tension lines where possible. The area thus outlined was incised deep to adipose tissue with a #15 scalpel blade.  The skin margins were undermined to an appropriate distance in all directions utilizing iris scissors. Modified Advancement Flap Text: The defect edges were debeveled with a #15 scalpel blade.  Given the location of the defect, shape of the defect and the proximity to free margins a modified advancement flap was deemed most appropriate.  Using a sterile surgical marker, an appropriate advancement flap was drawn incorporating the defect and placing the expected incisions within the relaxed skin tension lines where possible.    The area thus outlined was incised deep to adipose tissue with a #15 scalpel blade.  The skin margins were undermined to an appropriate distance in all directions utilizing iris scissors. Mucosal Advancement Flap Text: Given the location of the defect, shape of the defect and the proximity to free margins a mucosal advancement flap was deemed most appropriate. Incisions were made with a 15 blade scalpel in the appropriate fashion along the cutaneous vermilion border and the mucosal lip. The remaining actinically damaged mucosal tissue was excised.  The mucosal advancement flap was then elevated to the gingival sulcus with care taken to preserve the neurovascular structures and advanced into the primary defect. Care was taken to ensure that precise realignment of the vermilion border was achieved. Peng Advancement Flap Text: The defect edges were debeveled with a #15 scalpel blade.  Given the location of the defect, shape of the defect and the proximity to free margins a Peng advancement flap was deemed most appropriate.  Using a sterile surgical marker, an appropriate advancement flap was drawn incorporating the defect and placing the expected incisions within the relaxed skin tension lines where possible. The area thus outlined was incised deep to adipose tissue with a #15 scalpel blade.  The skin margins were undermined to an appropriate distance in all directions utilizing iris scissors. Hatchet Flap Text: The defect edges were debeveled with a #15 scalpel blade.  Given the location of the defect, shape of the defect and the proximity to free margins a hatchet flap was deemed most appropriate.  Using a sterile surgical marker, an appropriate hatchet flap was drawn incorporating the defect and placing the expected incisions within the relaxed skin tension lines where possible.    The area thus outlined was incised deep to adipose tissue with a #15 scalpel blade.  The skin margins were undermined to an appropriate distance in all directions utilizing iris scissors. Rotation Flap Text: The defect edges were debeveled with a #15 scalpel blade.  Given the location of the defect, shape of the defect and the proximity to free margins a rotation flap was deemed most appropriate.  Using a sterile surgical marker, an appropriate rotation flap was drawn incorporating the defect and placing the expected incisions within the relaxed skin tension lines where possible.    The area thus outlined was incised deep to adipose tissue with a #15 scalpel blade.  The skin margins were undermined to an appropriate distance in all directions utilizing iris scissors. Spiral Flap Text: The defect edges were debeveled with a #15 scalpel blade.  Given the location of the defect, shape of the defect and the proximity to free margins a spiral flap was deemed most appropriate.  Using a sterile surgical marker, an appropriate rotation flap was drawn incorporating the defect and placing the expected incisions within the relaxed skin tension lines where possible. The area thus outlined was incised deep to adipose tissue with a #15 scalpel blade.  The skin margins were undermined to an appropriate distance in all directions utilizing iris scissors. Star Wedge Flap Text: The defect edges were debeveled with a #15 scalpel blade.  Given the location of the defect, shape of the defect and the proximity to free margins a star wedge flap was deemed most appropriate.  Using a sterile surgical marker, an appropriate rotation flap was drawn incorporating the defect and placing the expected incisions within the relaxed skin tension lines where possible. The area thus outlined was incised deep to adipose tissue with a #15 scalpel blade.  The skin margins were undermined to an appropriate distance in all directions utilizing iris scissors. Transposition Flap Text: The defect edges were debeveled with a #15 scalpel blade.  Given the location of the defect and the proximity to free margins a transposition flap was deemed most appropriate.  Using a sterile surgical marker, an appropriate transposition flap was drawn incorporating the defect.    The area thus outlined was incised deep to adipose tissue with a #15 scalpel blade.  The skin margins were undermined to an appropriate distance in all directions utilizing iris scissors. Muscle Hinge Flap Text: The defect edges were debeveled with a #15 scalpel blade.  Given the size, depth and location of the defect and the proximity to free margins a muscle hinge flap was deemed most appropriate.  Using a sterile surgical marker, an appropriate hinge flap was drawn incorporating the defect. The area thus outlined was incised with a #15 scalpel blade.  The skin margins were undermined to an appropriate distance in all directions utilizing iris scissors. Nasal Turnover Hinge Flap Text: The defect edges were debeveled with a #15 scalpel blade.  Given the size, depth, location of the defect and the defect being full thickness a nasal turnover hinge flap was deemed most appropriate.  Using a sterile surgical marker, an appropriate hinge flap was drawn incorporating the defect. The area thus outlined was incised with a #15 scalpel blade. The flap was designed to recreate the nasal mucosal lining and the alar rim. The skin margins were undermined to an appropriate distance in all directions utilizing iris scissors. Nasalis-Muscle-Based Myocutaneous Island Pedicle Flap Text: Using a #15 blade, an incision was made around the donor flap to the level of the nasalis muscle. Wide lateral undermining was then performed in both the subcutaneous plane above the nasalis muscle, and in a submuscular plane just above periosteum. This allowed the formation of a free nasalis muscle axial pedicle (based on the angular artery) which was still attached to the actual cutaneous flap, increasing its mobility and vascular viability. Hemostasis was obtained with pinpoint electrocoagulation. The flap was mobilized into position and the pivotal anchor points positioned and stabilized with buried interrupted sutures. Subcutaneous and dermal tissues were closed in a multilayered fashion with sutures. Tissue redundancies were excised, and the epidermal edges were apposed without significant tension and sutured with sutures. Orbicularis Oris Muscle Flap Text: The defect edges were debeveled with a #15 scalpel blade.  Given that the defect affected the competency of the oral sphincter an obicularis oris muscle flap was deemed most appropriate to restore this competency and normal muscle function.  Using a sterile surgical marker, an appropriate flap was drawn incorporating the defect. The area thus outlined was incised with a #15 scalpel blade. Melolabial Transposition Flap Text: The defect edges were debeveled with a #15 scalpel blade.  Given the location of the defect and the proximity to free margins a melolabial flap was deemed most appropriate.  Using a sterile surgical marker, an appropriate melolabial transposition flap was drawn incorporating the defect.    The area thus outlined was incised deep to adipose tissue with a #15 scalpel blade.  The skin margins were undermined to an appropriate distance in all directions utilizing iris scissors. Rhombic Flap Text: The defect edges were debeveled with a #15 scalpel blade.  Given the location of the defect and the proximity to free margins a rhombic flap was deemed most appropriate.  Using a sterile surgical marker, an appropriate rhombic flap was drawn incorporating the defect.    The area thus outlined was incised deep to adipose tissue with a #15 scalpel blade.  The skin margins were undermined to an appropriate distance in all directions utilizing iris scissors. Rhomboid Transposition Flap Text: The defect edges were debeveled with a #15 scalpel blade.  Given the location of the defect and the proximity to free margins a rhomboid transposition flap was deemed most appropriate.  Using a sterile surgical marker, an appropriate rhomboid flap was drawn incorporating the defect.    The area thus outlined was incised deep to adipose tissue with a #15 scalpel blade.  The skin margins were undermined to an appropriate distance in all directions utilizing iris scissors. Bi-Rhombic Flap Text: The defect edges were debeveled with a #15 scalpel blade.  Given the location of the defect and the proximity to free margins a bi-rhombic flap was deemed most appropriate.  Using a sterile surgical marker, an appropriate rhombic flap was drawn incorporating the defect. The area thus outlined was incised deep to adipose tissue with a #15 scalpel blade.  The skin margins were undermined to an appropriate distance in all directions utilizing iris scissors. Helical Rim Advancement Flap Text: The defect edges were debeveled with a #15 blade scalpel.  Given the location of the defect and the proximity to free margins (helical rim) a double helical rim advancement flap was deemed most appropriate.  Using a sterile surgical marker, the appropriate advancement flaps were drawn incorporating the defect and placing the expected incisions between the helical rim and antihelix where possible.  The area thus outlined was incised through and through with a #15 scalpel blade.  With a skin hook and iris scissors, the flaps were gently and sharply undermined and freed up. Bilateral Helical Rim Advancement Flap Text: The defect edges were debeveled with a #15 blade scalpel.  Given the location of the defect and the proximity to free margins (helical rim) a bilateral helical rim advancement flap was deemed most appropriate.  Using a sterile surgical marker, the appropriate advancement flaps were drawn incorporating the defect and placing the expected incisions between the helical rim and antihelix where possible.  The area thus outlined was incised through and through with a #15 scalpel blade.  With a skin hook and iris scissors, the flaps were gently and sharply undermined and freed up. Ear Star Wedge Flap Text: The defect edges were debeveled with a #15 blade scalpel.  Given the location of the defect and the proximity to free margins (helical rim) an ear star wedge flap was deemed most appropriate.  Using a sterile surgical marker, the appropriate flap was drawn incorporating the defect and placing the expected incisions between the helical rim and antihelix where possible.  The area thus outlined was incised through and through with a #15 scalpel blade. Banner Transposition Flap Text: The defect edges were debeveled with a #15 scalpel blade.  Given the location of the defect and the proximity to free margins a Banner transposition flap was deemed most appropriate.  Using a sterile surgical marker, an appropriate flap drawn around the defect. The area thus outlined was incised deep to adipose tissue with a #15 scalpel blade.  The skin margins were undermined to an appropriate distance in all directions utilizing iris scissors. Bilobed Flap Text: The defect edges were debeveled with a #15 scalpel blade.  Given the location of the defect and the proximity to free margins a bilobe flap was deemed most appropriate.  Using a sterile surgical marker, an appropriate bilobe flap drawn around the defect.    The area thus outlined was incised deep to adipose tissue with a #15 scalpel blade.  The skin margins were undermined to an appropriate distance in all directions utilizing iris scissors. Bilobed Transposition Flap Text: The defect edges were debeveled with a #15 scalpel blade.  Given the location of the defect and the proximity to free margins a bilobed transposition flap was deemed most appropriate.  Using a sterile surgical marker, an appropriate bilobe flap drawn around the defect.    The area thus outlined was incised deep to adipose tissue with a #15 scalpel blade.  The skin margins were undermined to an appropriate distance in all directions utilizing iris scissors. Trilobed Flap Text: The defect edges were debeveled with a #15 scalpel blade.  Given the location of the defect and the proximity to free margins a trilobed flap was deemed most appropriate.  Using a sterile surgical marker, an appropriate trilobed flap drawn around the defect.    The area thus outlined was incised deep to adipose tissue with a #15 scalpel blade.  The skin margins were undermined to an appropriate distance in all directions utilizing iris scissors. Dorsal Nasal Flap Text: The defect edges were debeveled with a #15 scalpel blade.  Given the location of the defect and the proximity to free margins a dorsal nasal flap was deemed most appropriate.  Using a sterile surgical marker, an appropriate dorsal nasal flap was drawn around the defect.    The area thus outlined was incised deep to adipose tissue with a #15 scalpel blade.  The skin margins were undermined to an appropriate distance in all directions utilizing iris scissors. Island Pedicle Flap Text: The defect edges were debeveled with a #15 scalpel blade.  Given the location of the defect, shape of the defect and the proximity to free margins an island pedicle advancement flap was deemed most appropriate.  Using a sterile surgical marker, an appropriate advancement flap was drawn incorporating the defect, outlining the appropriate donor tissue and placing the expected incisions within the relaxed skin tension lines where possible.    The area thus outlined was incised deep to adipose tissue with a #15 scalpel blade.  The skin margins were undermined to an appropriate distance in all directions around the primary defect and laterally outward around the island pedicle utilizing iris scissors.  There was minimal undermining beneath the pedicle flap. Island Pedicle Flap With Canthal Suspension Text: The defect edges were debeveled with a #15 scalpel blade.  Given the location of the defect, shape of the defect and the proximity to free margins an island pedicle advancement flap was deemed most appropriate.  Using a sterile surgical marker, an appropriate advancement flap was drawn incorporating the defect, outlining the appropriate donor tissue and placing the expected incisions within the relaxed skin tension lines where possible. The area thus outlined was incised deep to adipose tissue with a #15 scalpel blade.  The skin margins were undermined to an appropriate distance in all directions around the primary defect and laterally outward around the island pedicle utilizing iris scissors.  There was minimal undermining beneath the pedicle flap. A suspension suture was placed in the canthal tendon to prevent tension and prevent ectropion. Alar Island Pedicle Flap Text: The defect edges were debeveled with a #15 scalpel blade.  Given the location of the defect, shape of the defect and the proximity to the alar rim an island pedicle advancement flap was deemed most appropriate.  Using a sterile surgical marker, an appropriate advancement flap was drawn incorporating the defect, outlining the appropriate donor tissue and placing the expected incisions within the nasal ala running parallel to the alar rim. The area thus outlined was incised with a #15 scalpel blade.  The skin margins were undermined minimally to an appropriate distance in all directions around the primary defect and laterally outward around the island pedicle utilizing iris scissors.  There was minimal undermining beneath the pedicle flap. Double Island Pedicle Flap Text: The defect edges were debeveled with a #15 scalpel blade.  Given the location of the defect, shape of the defect and the proximity to free margins a double island pedicle advancement flap was deemed most appropriate.  Using a sterile surgical marker, an appropriate advancement flap was drawn incorporating the defect, outlining the appropriate donor tissue and placing the expected incisions within the relaxed skin tension lines where possible.    The area thus outlined was incised deep to adipose tissue with a #15 scalpel blade.  The skin margins were undermined to an appropriate distance in all directions around the primary defect and laterally outward around the island pedicle utilizing iris scissors.  There was minimal undermining beneath the pedicle flap. Island Pedicle Flap-Requiring Vessel Identification Text: The defect edges were debeveled with a #15 scalpel blade.  Given the location of the defect, shape of the defect and the proximity to free margins an island pedicle advancement flap was deemed most appropriate.  Using a sterile surgical marker, an appropriate advancement flap was drawn, based on the axial vessel mentioned above, incorporating the defect, outlining the appropriate donor tissue and placing the expected incisions within the relaxed skin tension lines where possible.    The area thus outlined was incised deep to adipose tissue with a #15 scalpel blade.  The skin margins were undermined to an appropriate distance in all directions around the primary defect and laterally outward around the island pedicle utilizing iris scissors.  There was minimal undermining beneath the pedicle flap. Keystone Flap Text: The defect edges were debeveled with a #15 scalpel blade.  Given the location of the defect, shape of the defect a keystone flap was deemed most appropriate.  Using a sterile surgical marker, an appropriate keystone flap was drawn incorporating the defect, outlining the appropriate donor tissue and placing the expected incisions within the relaxed skin tension lines where possible. The area thus outlined was incised deep to adipose tissue with a #15 scalpel blade.  The skin margins were undermined to an appropriate distance in all directions around the primary defect and laterally outward around the flap utilizing iris scissors. O-T Plasty Text: The defect edges were debeveled with a #15 scalpel blade.  Given the location of the defect, shape of the defect and the proximity to free margins an O-T plasty was deemed most appropriate.  Using a sterile surgical marker, an appropriate O-T plasty was drawn incorporating the defect and placing the expected incisions within the relaxed skin tension lines where possible.    The area thus outlined was incised deep to adipose tissue with a #15 scalpel blade.  The skin margins were undermined to an appropriate distance in all directions utilizing iris scissors. O-Z Plasty Text: The defect edges were debeveled with a #15 scalpel blade.  Given the location of the defect, shape of the defect and the proximity to free margins an O-Z plasty (double transposition flap) was deemed most appropriate.  Using a sterile surgical marker, the appropriate transposition flaps were drawn incorporating the defect and placing the expected incisions within the relaxed skin tension lines where possible.    The area thus outlined was incised deep to adipose tissue with a #15 scalpel blade.  The skin margins were undermined to an appropriate distance in all directions utilizing iris scissors.  Hemostasis was achieved with electrocautery.  The flaps were then transposed into place, one clockwise and the other counterclockwise, and anchored with interrupted buried subcutaneous sutures. Double O-Z Plasty Text: The defect edges were debeveled with a #15 scalpel blade.  Given the location of the defect, shape of the defect and the proximity to free margins a Double O-Z plasty (double transposition flap) was deemed most appropriate.  Using a sterile surgical marker, the appropriate transposition flaps were drawn incorporating the defect and placing the expected incisions within the relaxed skin tension lines where possible. The area thus outlined was incised deep to adipose tissue with a #15 scalpel blade.  The skin margins were undermined to an appropriate distance in all directions utilizing iris scissors.  Hemostasis was achieved with electrocautery.  The flaps were then transposed into place, one clockwise and the other counterclockwise, and anchored with interrupted buried subcutaneous sutures. V-Y Plasty Text: The defect edges were debeveled with a #15 scalpel blade.  Given the location of the defect, shape of the defect and the proximity to free margins an V-Y advancement flap was deemed most appropriate.  Using a sterile surgical marker, an appropriate advancement flap was drawn incorporating the defect and placing the expected incisions within the relaxed skin tension lines where possible.    The area thus outlined was incised deep to adipose tissue with a #15 scalpel blade.  The skin margins were undermined to an appropriate distance in all directions utilizing iris scissors. H Plasty Text: Given the location of the defect, shape of the defect and the proximity to free margins a H-plasty was deemed most appropriate for repair.  Using a sterile surgical marker, the appropriate advancement arms of the H-plasty were drawn incorporating the defect and placing the expected incisions within the relaxed skin tension lines where possible. The area thus outlined was incised deep to adipose tissue with a #15 scalpel blade. The skin margins were undermined to an appropriate distance in all directions utilizing iris scissors.  The opposing advancement arms were then advanced into place in opposite direction and anchored with interrupted buried subcutaneous sutures. W Plasty Text: The lesion was extirpated to the level of the fat with a #15 scalpel blade.  Given the location of the defect, shape of the defect and the proximity to free margins a W-plasty was deemed most appropriate for repair.  Using a sterile surgical marker, the appropriate transposition arms of the W-plasty were drawn incorporating the defect and placing the expected incisions within the relaxed skin tension lines where possible.    The area thus outlined was incised deep to adipose tissue with a #15 scalpel blade.  The skin margins were undermined to an appropriate distance in all directions utilizing iris scissors.  The opposing transposition arms were then transposed into place in opposite direction and anchored with interrupted buried subcutaneous sutures. Z Plasty Text: The lesion was extirpated to the level of the fat with a #15 scalpel blade.  Given the location of the defect, shape of the defect and the proximity to free margins a Z-plasty was deemed most appropriate for repair.  Using a sterile surgical marker, the appropriate transposition arms of the Z-plasty were drawn incorporating the defect and placing the expected incisions within the relaxed skin tension lines where possible.    The area thus outlined was incised deep to adipose tissue with a #15 scalpel blade.  The skin margins were undermined to an appropriate distance in all directions utilizing iris scissors.  The opposing transposition arms were then transposed into place in opposite direction and anchored with interrupted buried subcutaneous sutures. Zygomaticofacial Flap Text: Given the location of the defect, shape of the defect and the proximity to free margins a zygomaticofacial flap was deemed most appropriate for repair.  Using a sterile surgical marker, the appropriate flap was drawn incorporating the defect and placing the expected incisions within the relaxed skin tension lines where possible. The area thus outlined was incised deep to adipose tissue with a #15 scalpel blade with preservation of a vascular pedicle.  The skin margins were undermined to an appropriate distance in all directions utilizing iris scissors.  The flap was then placed into the defect and anchored with interrupted buried subcutaneous sutures. Cheek Interpolation Flap Text: A decision was made to reconstruct the defect utilizing an interpolation axial flap and a staged reconstruction.  A telfa template was made of the defect.  This telfa template was then used to outline the Cheek Interpolation flap.  The donor area for the pedicle flap was then injected with anesthesia.  The flap was excised through the skin and subcutaneous tissue down to the layer of the underlying musculature.  The interpolation flap was carefully excised within this deep plane to maintain its blood supply.  The edges of the donor site were undermined.   The donor site was closed in a primary fashion.  The pedicle was then rotated into position and sutured.  Once the tube was sutured into place, adequate blood supply was confirmed with blanching and refill.  The pedicle was then wrapped with xeroform gauze and dressed appropriately with a telfa and gauze bandage to ensure continued blood supply and protect the attached pedicle. Cheek-To-Nose Interpolation Flap Text: A decision was made to reconstruct the defect utilizing an interpolation axial flap and a staged reconstruction.  A telfa template was made of the defect.  This telfa template was then used to outline the Cheek-To-Nose Interpolation flap.  The donor area for the pedicle flap was then injected with anesthesia.  The flap was excised through the skin and subcutaneous tissue down to the layer of the underlying musculature.  The interpolation flap was carefully excised within this deep plane to maintain its blood supply.  The edges of the donor site were undermined.   The donor site was closed in a primary fashion.  The pedicle was then rotated into position and sutured.  Once the tube was sutured into place, adequate blood supply was confirmed with blanching and refill.  The pedicle was then wrapped with xeroform gauze and dressed appropriately with a telfa and gauze bandage to ensure continued blood supply and protect the attached pedicle. Interpolation Flap Text: A decision was made to reconstruct the defect utilizing an interpolation axial flap and a staged reconstruction.  A telfa template was made of the defect.  This telfa template was then used to outline the interpolation flap.  The donor area for the pedicle flap was then injected with anesthesia.  The flap was excised through the skin and subcutaneous tissue down to the layer of the underlying musculature.  The interpolation flap was carefully excised within this deep plane to maintain its blood supply.  The edges of the donor site were undermined.   The donor site was closed in a primary fashion.  The pedicle was then rotated into position and sutured.  Once the tube was sutured into place, adequate blood supply was confirmed with blanching and refill.  The pedicle was then wrapped with xeroform gauze and dressed appropriately with a telfa and gauze bandage to ensure continued blood supply and protect the attached pedicle. Melolabial Interpolation Flap Text: A decision was made to reconstruct the defect utilizing an interpolation axial flap and a staged reconstruction.  A telfa template was made of the defect.  This telfa template was then used to outline the melolabial interpolation flap.  The donor area for the pedicle flap was then injected with anesthesia.  The flap was excised through the skin and subcutaneous tissue down to the layer of the underlying musculature.  The pedicle flap was carefully excised within this deep plane to maintain its blood supply.  The edges of the donor site were undermined.   The donor site was closed in a primary fashion.  The pedicle was then rotated into position and sutured.  Once the tube was sutured into place, adequate blood supply was confirmed with blanching and refill.  The pedicle was then wrapped with xeroform gauze and dressed appropriately with a telfa and gauze bandage to ensure continued blood supply and protect the attached pedicle. Mastoid Interpolation Flap Text: A decision was made to reconstruct the defect utilizing an interpolation axial flap and a staged reconstruction.  A telfa template was made of the defect.  This telfa template was then used to outline the mastoid interpolation flap.  The donor area for the pedicle flap was then injected with anesthesia.  The flap was excised through the skin and subcutaneous tissue down to the layer of the underlying musculature.  The pedicle flap was carefully excised within this deep plane to maintain its blood supply.  The edges of the donor site were undermined.   The donor site was closed in a primary fashion.  The pedicle was then rotated into position and sutured.  Once the tube was sutured into place, adequate blood supply was confirmed with blanching and refill.  The pedicle was then wrapped with xeroform gauze and dressed appropriately with a telfa and gauze bandage to ensure continued blood supply and protect the attached pedicle. Posterior Auricular Interpolation Flap Text: A decision was made to reconstruct the defect utilizing an interpolation axial flap and a staged reconstruction.  A telfa template was made of the defect.  This telfa template was then used to outline the posterior auricular interpolation flap.  The donor area for the pedicle flap was then injected with anesthesia.  The flap was excised through the skin and subcutaneous tissue down to the layer of the underlying musculature.  The pedicle flap was carefully excised within this deep plane to maintain its blood supply.  The edges of the donor site were undermined.   The donor site was closed in a primary fashion.  The pedicle was then rotated into position and sutured.  Once the tube was sutured into place, adequate blood supply was confirmed with blanching and refill.  The pedicle was then wrapped with xeroform gauze and dressed appropriately with a telfa and gauze bandage to ensure continued blood supply and protect the attached pedicle. Paramedian Forehead Flap Text: A decision was made to reconstruct the defect utilizing an interpolation axial flap and a staged reconstruction.  A telfa template was made of the defect.  This telfa template was then used to outline the paramedian forehead pedicle flap.  The donor area for the pedicle flap was then injected with anesthesia.  The flap was excised through the skin and subcutaneous tissue down to the layer of the underlying musculature.  The pedicle flap was carefully excised within this deep plane to maintain its blood supply.  The edges of the donor site were undermined.   The donor site was closed in a primary fashion.  The pedicle was then rotated into position and sutured.  Once the tube was sutured into place, adequate blood supply was confirmed with blanching and refill.  The pedicle was then wrapped with xeroform gauze and dressed appropriately with a telfa and gauze bandage to ensure continued blood supply and protect the attached pedicle. Cheiloplasty (Less Than 50%) Text: A decision was made to reconstruct the defect with a  cheiloplasty.  The defect was undermined extensively.  Additional obicularis oris muscle was excised with a 15 blade scalpel.  The defect was converted into a full thickness wedge, of less than 50% of the vertical height of the lip, to facilite a better cosmetic result.  Small vessels were then tied off with 5-0 monocyrl. The obicularis oris, superficial fascia, adipose and dermis were then reapproximated.  After the deeper layers were approximated the epidermis was reapproximated with particular care given to realign the vermilion border. Cheiloplasty (Complex) Text: A decision was made to reconstruct the defect with a  cheiloplasty.  The defect was undermined extensively.  Additional obicularis oris muscle was excised with a 15 blade scalpel.  The defect was converted into a full thickness wedge to facilite a better cosmetic result.  Small vessels were then tied off with 5-0 monocyrl. The obicularis oris, superficial fascia, adipose and dermis were then reapproximated.  After the deeper layers were approximated the epidermis was reapproximated with particular care given to realign the vermilion border. Ear Wedge Repair Text: A wedge excision was completed by carrying down an excision through the full thickness of the ear and cartilage with an inward facing Burow's triangle. The wound was then closed in a layered fashion. Full Thickness Lip Wedge Repair (Flap) Text: Given the location of the defect and the proximity to free margins a full thickness wedge repair was deemed most appropriate.  Using a sterile surgical marker, the appropriate repair was drawn incorporating the defect and placing the expected incisions perpendicular to the vermilion border.  The vermilion border was also meticulously outlined to ensure appropriate reapproximation during the repair.  The area thus outlined was incised through and through with a #15 scalpel blade.  The muscularis and dermis were reaproximated with deep sutures following hemostasis. Care was taken to realign the vermilion border before proceeding with the superficial closure.  Once the vermilion was realigned the superfical and mucosal closure was finished. Ftsg Text: The defect edges were debeveled with a #15 scalpel blade.  Given the location of the defect, shape of the defect and the proximity to free margins a full thickness skin graft was deemed most appropriate.  Using a sterile surgical marker, the primary defect shape was transferred to the donor site. The area thus outlined was incised deep to adipose tissue with a #15 scalpel blade.  The harvested graft was then trimmed of adipose tissue until only dermis and epidermis was left.  The skin margins of the secondary defect were undermined to an appropriate distance in all directions utilizing iris scissors.  The secondary defect was closed with interrupted buried subcutaneous sutures.  The skin edges were then re-apposed with running  sutures.  The skin graft was then placed in the primary defect and oriented appropriately. Split-Thickness Skin Graft Text: The defect edges were debeveled with a #15 scalpel blade.  Given the location of the defect, shape of the defect and the proximity to free margins a split thickness skin graft was deemed most appropriate.  Using a sterile surgical marker, the primary defect shape was transferred to the donor site. The split thickness graft was then harvested.  The skin graft was then placed in the primary defect and oriented appropriately. Burow's Graft Text: The defect edges were debeveled with a #15 scalpel blade.  Given the location of the defect, shape of the defect, the proximity to free margins and the presence of a standing cone deformity a Burow's skin graft was deemed most appropriate. The standing cone was removed and this tissue was then trimmed to the shape of the primary defect. The adipose tissue was also removed until only dermis and epidermis were left.  The skin margins of the secondary defect were undermined to an appropriate distance in all directions utilizing iris scissors.  The secondary defect was closed with interrupted buried subcutaneous sutures.  The skin edges were then re-apposed with running  sutures.  The skin graft was then placed in the primary defect and oriented appropriately. Cartilage Graft Text: The defect edges were debeveled with a #15 scalpel blade.  Given the location of the defect, shape of the defect, the fact the defect involved a full thickness cartilage defect a cartilage graft was deemed most appropriate.  An appropriate donor site was identified, cleansed, and anesthetized. The cartilage graft was then harvested and transferred to the recipient site, oriented appropriately and then sutured into place.  The secondary defect was then repaired using a primary closure. Composite Graft Text: The defect edges were debeveled with a #15 scalpel blade.  Given the location of the defect, shape of the defect, the proximity to free margins and the fact the defect was full thickness a composite graft was deemed most appropriate.  The defect was outline and then transferred to the donor site.  A full thickness graft was then excised from the donor site. The graft was then placed in the primary defect, oriented appropriately and then sutured into place.  The secondary defect was then repaired using a primary closure. Epidermal Autograft Text: The defect edges were debeveled with a #15 scalpel blade.  Given the location of the defect, shape of the defect and the proximity to free margins an epidermal autograft was deemed most appropriate.  Using a sterile surgical marker, the primary defect shape was transferred to the donor site. The epidermal graft was then harvested.  The skin graft was then placed in the primary defect and oriented appropriately. Dermal Autograft Text: The defect edges were debeveled with a #15 scalpel blade.  Given the location of the defect, shape of the defect and the proximity to free margins a dermal autograft was deemed most appropriate.  Using a sterile surgical marker, the primary defect shape was transferred to the donor site. The area thus outlined was incised deep to adipose tissue with a #15 scalpel blade.  The harvested graft was then trimmed of adipose and epidermal tissue until only dermis was left.  The skin graft was then placed in the primary defect and oriented appropriately. Skin Substitute Text: The defect edges were debeveled with a #15 scalpel blade.  Given the location of the defect, shape of the defect and the proximity to free margins a skin substitute graft was deemed most appropriate.  The graft material was trimmed to fit the size of the defect. The graft was then placed in the primary defect and oriented appropriately. Tissue Cultured Epidermal Autograft Text: The defect edges were debeveled with a #15 scalpel blade.  Given the location of the defect, shape of the defect and the proximity to free margins a tissue cultured epidermal autograft was deemed most appropriate.  The graft was then trimmed to fit the size of the defect.  The graft was then placed in the primary defect and oriented appropriately. Xenograft Text: The defect edges were debeveled with a #15 scalpel blade.  Given the location of the defect, shape of the defect and the proximity to free margins a xenograft was deemed most appropriate.  The graft was then trimmed to fit the size of the defect.  The graft was then placed in the primary defect and oriented appropriately. Purse String (Simple) Text: Given the location of the defect and the characteristics of the surrounding skin a purse string closure was deemed most appropriate.  Undermining was performed circumfirentially around the surgical defect.  A purse string suture was then placed and tightened. Purse String (Intermediate) Text: Given the location of the defect and the characteristics of the surrounding skin a purse string intermediate closure was deemed most appropriate.  Undermining was performed circumfirentially around the surgical defect.  A purse string suture was then placed and tightened. Partial Purse String (Simple) Text: Given the location of the defect and the characteristics of the surrounding skin a simple purse string closure was deemed most appropriate.  Undermining was performed circumfirentially around the surgical defect.  A purse string suture was then placed and tightened. Wound tension only allowed a partial closure of the circular defect. Partial Purse String (Intermediate) Text: Given the location of the defect and the characteristics of the surrounding skin an intermediate purse string closure was deemed most appropriate.  Undermining was performed circumfirentially around the surgical defect.  A purse string suture was then placed and tightened. Wound tension only allowed a partial closure of the circular defect. Localized Dermabrasion Text: The patient was draped in routine manner.  Localized dermabrasion using 3 x 17 mm wire brush was performed in routine manner to papillary dermis. This spot dermabrasion is being performed to complete skin cancer reconstruction. It also will eliminate the other sun damaged precancerous cells that are known to be part of the regional effect of a lifetime's worth of sun exposure. This localized dermabrasion is therapeutic and should not be considered cosmetic in any regard. Tarsorrhaphy Text: A tarsorrhaphy was performed using Frost sutures. Complex Repair And Flap Additional Text (Will Appearing After The Standard Complex Repair Text): The complex repair was not sufficient to completely close the primary defect. The remaining additional defect was repaired with the flap mentioned below. Complex Repair And Graft Additional Text (Will Appearing After The Standard Complex Repair Text): The complex repair was not sufficient to completely close the primary defect. The remaining additional defect was repaired with the graft mentioned below. Manual Repair Warning Statement: We plan on removing the manually selected variable below in favor of our much easier automatic structured text blocks found in the previous tab. We decided to do this to help make the flow better and give you the full power of structured data. Manual selection is never going to be ideal in our platform and I would encourage you to avoid using manual selection from this point on, especially since I will be sunsetting this feature. It is important that you do one of two things with the customized text below. First, you can save all of the text in a word file so you can have it for future reference. Second, transfer the text to the appropriate area in the Library tab. Lastly, if there is a flap or graft type which we do not have you need to let us know right away so I can add it in before the variable is hidden. No need to panic, we plan to give you roughly 6 months to make the change. Same Histology In Subsequent Stages Text: The pattern and morphology of the tumor is as described in the first stage. No Residual Tumor Seen Histology Text: There were no malignant cells seen in the sections examined. Inflammation Suggestive Of Cancer Camouflage Histology Text: There was a dense lymphocytic infiltrate which prevented adequate histologic evaluation of adjacent structures. Bcc Histology Text: There were numerous aggregates of basaloid cells. Bcc Infiltrative Histology Text: There were numerous aggregates of basaloid cells demonstrating an infiltrative pattern. Mart-1 - Positive Histology Text: MART-1 staining demonstrates areas of higher density and clustering of melanocytes with Pagetoid spread upwards within the epidermis. The surgical margins are positive for tumor cells. Mart-1 - Negative Histology Text: MART-1 staining demonstrates a normal density and pattern of melanocytes along the dermal-epidermal junction. The surgical margins are negative for tumor cells. Information: Selecting Yes will display possible errors in your note based on the variables you have selected. This validation is only offered as a suggestion for you. PLEASE NOTE THAT THE VALIDATION TEXT WILL BE REMOVED WHEN YOU FINALIZE YOUR NOTE. IF YOU WANT TO FAX A PRELIMINARY NOTE YOU WILL NEED TO TOGGLE THIS TO 'NO' IF YOU DO NOT WANT IT IN YOUR FAXED NOTE.

## 2023-11-02 LAB
ANION GAP SERPL CALC-SCNC: 12 MMOL/L — SIGNIFICANT CHANGE UP (ref 5–17)
ANION GAP SERPL CALC-SCNC: 12 MMOL/L — SIGNIFICANT CHANGE UP (ref 5–17)
BUN SERPL-MCNC: 19 MG/DL — SIGNIFICANT CHANGE UP (ref 7–23)
BUN SERPL-MCNC: 19 MG/DL — SIGNIFICANT CHANGE UP (ref 7–23)
CALCIUM SERPL-MCNC: 8.9 MG/DL — SIGNIFICANT CHANGE UP (ref 8.4–10.5)
CALCIUM SERPL-MCNC: 8.9 MG/DL — SIGNIFICANT CHANGE UP (ref 8.4–10.5)
CHLORIDE SERPL-SCNC: 102 MMOL/L — SIGNIFICANT CHANGE UP (ref 96–108)
CHLORIDE SERPL-SCNC: 102 MMOL/L — SIGNIFICANT CHANGE UP (ref 96–108)
CO2 SERPL-SCNC: 25 MMOL/L — SIGNIFICANT CHANGE UP (ref 22–31)
CO2 SERPL-SCNC: 25 MMOL/L — SIGNIFICANT CHANGE UP (ref 22–31)
CREAT SERPL-MCNC: 0.6 MG/DL — SIGNIFICANT CHANGE UP (ref 0.5–1.3)
CREAT SERPL-MCNC: 0.6 MG/DL — SIGNIFICANT CHANGE UP (ref 0.5–1.3)
EGFR: 94 ML/MIN/1.73M2 — SIGNIFICANT CHANGE UP
EGFR: 94 ML/MIN/1.73M2 — SIGNIFICANT CHANGE UP
GLUCOSE BLDC GLUCOMTR-MCNC: 127 MG/DL — HIGH (ref 70–99)
GLUCOSE BLDC GLUCOMTR-MCNC: 127 MG/DL — HIGH (ref 70–99)
GLUCOSE BLDC GLUCOMTR-MCNC: 164 MG/DL — HIGH (ref 70–99)
GLUCOSE BLDC GLUCOMTR-MCNC: 164 MG/DL — HIGH (ref 70–99)
GLUCOSE BLDC GLUCOMTR-MCNC: 188 MG/DL — HIGH (ref 70–99)
GLUCOSE BLDC GLUCOMTR-MCNC: 188 MG/DL — HIGH (ref 70–99)
GLUCOSE BLDC GLUCOMTR-MCNC: 257 MG/DL — HIGH (ref 70–99)
GLUCOSE BLDC GLUCOMTR-MCNC: 257 MG/DL — HIGH (ref 70–99)
GLUCOSE SERPL-MCNC: 282 MG/DL — HIGH (ref 70–99)
GLUCOSE SERPL-MCNC: 282 MG/DL — HIGH (ref 70–99)
HCT VFR BLD CALC: 35.4 % — SIGNIFICANT CHANGE UP (ref 34.5–45)
HCT VFR BLD CALC: 35.4 % — SIGNIFICANT CHANGE UP (ref 34.5–45)
HGB BLD-MCNC: 10 G/DL — LOW (ref 11.5–15.5)
HGB BLD-MCNC: 10 G/DL — LOW (ref 11.5–15.5)
INR BLD: 2.02 RATIO — HIGH (ref 0.85–1.18)
INR BLD: 2.02 RATIO — HIGH (ref 0.85–1.18)
MCHC RBC-ENTMCNC: 20.4 PG — LOW (ref 27–34)
MCHC RBC-ENTMCNC: 20.4 PG — LOW (ref 27–34)
MCHC RBC-ENTMCNC: 28.2 GM/DL — LOW (ref 32–36)
MCHC RBC-ENTMCNC: 28.2 GM/DL — LOW (ref 32–36)
MCV RBC AUTO: 72.1 FL — LOW (ref 80–100)
MCV RBC AUTO: 72.1 FL — LOW (ref 80–100)
NRBC # BLD: 0 /100 WBCS — SIGNIFICANT CHANGE UP (ref 0–0)
NRBC # BLD: 0 /100 WBCS — SIGNIFICANT CHANGE UP (ref 0–0)
PLATELET # BLD AUTO: 349 K/UL — SIGNIFICANT CHANGE UP (ref 150–400)
PLATELET # BLD AUTO: 349 K/UL — SIGNIFICANT CHANGE UP (ref 150–400)
POTASSIUM SERPL-MCNC: 5.2 MMOL/L — SIGNIFICANT CHANGE UP (ref 3.5–5.3)
POTASSIUM SERPL-MCNC: 5.2 MMOL/L — SIGNIFICANT CHANGE UP (ref 3.5–5.3)
POTASSIUM SERPL-SCNC: 5.2 MMOL/L — SIGNIFICANT CHANGE UP (ref 3.5–5.3)
POTASSIUM SERPL-SCNC: 5.2 MMOL/L — SIGNIFICANT CHANGE UP (ref 3.5–5.3)
PROTHROM AB SERPL-ACNC: 20.8 SEC — HIGH (ref 9.5–13)
PROTHROM AB SERPL-ACNC: 20.8 SEC — HIGH (ref 9.5–13)
RBC # BLD: 4.91 M/UL — SIGNIFICANT CHANGE UP (ref 3.8–5.2)
RBC # BLD: 4.91 M/UL — SIGNIFICANT CHANGE UP (ref 3.8–5.2)
RBC # FLD: 22.2 % — HIGH (ref 10.3–14.5)
RBC # FLD: 22.2 % — HIGH (ref 10.3–14.5)
SODIUM SERPL-SCNC: 139 MMOL/L — SIGNIFICANT CHANGE UP (ref 135–145)
SODIUM SERPL-SCNC: 139 MMOL/L — SIGNIFICANT CHANGE UP (ref 135–145)
WBC # BLD: 12.19 K/UL — HIGH (ref 3.8–10.5)
WBC # BLD: 12.19 K/UL — HIGH (ref 3.8–10.5)
WBC # FLD AUTO: 12.19 K/UL — HIGH (ref 3.8–10.5)
WBC # FLD AUTO: 12.19 K/UL — HIGH (ref 3.8–10.5)

## 2023-11-02 PROCEDURE — 95720 EEG PHY/QHP EA INCR W/VEEG: CPT

## 2023-11-02 PROCEDURE — 99233 SBSQ HOSP IP/OBS HIGH 50: CPT

## 2023-11-02 PROCEDURE — 99233 SBSQ HOSP IP/OBS HIGH 50: CPT | Mod: GC

## 2023-11-02 RX ORDER — LACTULOSE 10 G/15ML
20 SOLUTION ORAL
Refills: 0 | Status: COMPLETED | OUTPATIENT
Start: 2023-11-02 | End: 2023-11-04

## 2023-11-02 RX ORDER — INSULIN LISPRO 100/ML
17 VIAL (ML) SUBCUTANEOUS
Refills: 0 | Status: DISCONTINUED | OUTPATIENT
Start: 2023-11-02 | End: 2023-11-04

## 2023-11-02 RX ORDER — WARFARIN SODIUM 2.5 MG/1
3.5 TABLET ORAL AT BEDTIME
Refills: 0 | Status: DISCONTINUED | OUTPATIENT
Start: 2023-11-02 | End: 2023-11-03

## 2023-11-02 RX ORDER — LACTULOSE 10 G/15ML
10 SOLUTION ORAL
Refills: 0 | Status: DISCONTINUED | OUTPATIENT
Start: 2023-11-02 | End: 2023-11-02

## 2023-11-02 RX ORDER — LEVETIRACETAM 250 MG/1
500 TABLET, FILM COATED ORAL
Refills: 0 | Status: DISCONTINUED | OUTPATIENT
Start: 2023-11-02 | End: 2023-11-05

## 2023-11-02 RX ORDER — ERTAPENEM SODIUM 1 G/1
1 INJECTION, POWDER, LYOPHILIZED, FOR SOLUTION INTRAMUSCULAR; INTRAVENOUS
Qty: 9 | Refills: 0
Start: 2023-11-02 | End: 2023-11-10

## 2023-11-02 RX ORDER — INSULIN GLARGINE 100 [IU]/ML
26 INJECTION, SOLUTION SUBCUTANEOUS AT BEDTIME
Refills: 0 | Status: DISCONTINUED | OUTPATIENT
Start: 2023-11-02 | End: 2023-11-05

## 2023-11-02 RX ADMIN — BUDESONIDE AND FORMOTEROL FUMARATE DIHYDRATE 2 PUFF(S): 160; 4.5 AEROSOL RESPIRATORY (INHALATION) at 06:18

## 2023-11-02 RX ADMIN — GABAPENTIN 100 MILLIGRAM(S): 400 CAPSULE ORAL at 17:18

## 2023-11-02 RX ADMIN — Medication 3 MILLILITER(S): at 12:23

## 2023-11-02 RX ADMIN — MONTELUKAST 10 MILLIGRAM(S): 4 TABLET, CHEWABLE ORAL at 12:24

## 2023-11-02 RX ADMIN — SODIUM CHLORIDE 4 MILLILITER(S): 9 INJECTION INTRAMUSCULAR; INTRAVENOUS; SUBCUTANEOUS at 12:23

## 2023-11-02 RX ADMIN — Medication 3 MILLILITER(S): at 17:18

## 2023-11-02 RX ADMIN — SODIUM CHLORIDE 4 MILLILITER(S): 9 INJECTION INTRAMUSCULAR; INTRAVENOUS; SUBCUTANEOUS at 17:18

## 2023-11-02 RX ADMIN — GABAPENTIN 100 MILLIGRAM(S): 400 CAPSULE ORAL at 08:43

## 2023-11-02 RX ADMIN — Medication 600 MILLIGRAM(S): at 06:14

## 2023-11-02 RX ADMIN — Medication 15 UNIT(S): at 08:46

## 2023-11-02 RX ADMIN — INSULIN GLARGINE 26 UNIT(S): 100 INJECTION, SOLUTION SUBCUTANEOUS at 22:21

## 2023-11-02 RX ADMIN — ATORVASTATIN CALCIUM 20 MILLIGRAM(S): 80 TABLET, FILM COATED ORAL at 22:22

## 2023-11-02 RX ADMIN — MEXILETINE HYDROCHLORIDE 200 MILLIGRAM(S): 150 CAPSULE ORAL at 22:33

## 2023-11-02 RX ADMIN — Medication 1 TABLET(S): at 12:25

## 2023-11-02 RX ADMIN — Medication 500 MILLIGRAM(S): at 12:25

## 2023-11-02 RX ADMIN — LEVETIRACETAM 500 MILLIGRAM(S): 250 TABLET, FILM COATED ORAL at 14:04

## 2023-11-02 RX ADMIN — Medication 3 MILLILITER(S): at 22:22

## 2023-11-02 RX ADMIN — ERTAPENEM SODIUM 120 MILLIGRAM(S): 1 INJECTION, POWDER, LYOPHILIZED, FOR SOLUTION INTRAMUSCULAR; INTRAVENOUS at 12:17

## 2023-11-02 RX ADMIN — GABAPENTIN 100 MILLIGRAM(S): 400 CAPSULE ORAL at 22:34

## 2023-11-02 RX ADMIN — LACTULOSE 10 GRAM(S): 10 SOLUTION ORAL at 06:15

## 2023-11-02 RX ADMIN — POLYETHYLENE GLYCOL 3350 17 GRAM(S): 17 POWDER, FOR SOLUTION ORAL at 12:24

## 2023-11-02 RX ADMIN — Medication 32 MILLIGRAM(S): at 06:14

## 2023-11-02 RX ADMIN — PANTOPRAZOLE SODIUM 40 MILLIGRAM(S): 20 TABLET, DELAYED RELEASE ORAL at 06:14

## 2023-11-02 RX ADMIN — WARFARIN SODIUM 3.5 MILLIGRAM(S): 2.5 TABLET ORAL at 22:20

## 2023-11-02 RX ADMIN — Medication 3: at 08:45

## 2023-11-02 RX ADMIN — Medication 17 UNIT(S): at 12:18

## 2023-11-02 RX ADMIN — SODIUM CHLORIDE 4 MILLILITER(S): 9 INJECTION INTRAMUSCULAR; INTRAVENOUS; SUBCUTANEOUS at 22:23

## 2023-11-02 RX ADMIN — Medication 17 UNIT(S): at 17:55

## 2023-11-02 RX ADMIN — Medication 50 MILLIGRAM(S): at 12:16

## 2023-11-02 RX ADMIN — CLOPIDOGREL BISULFATE 75 MILLIGRAM(S): 75 TABLET, FILM COATED ORAL at 12:24

## 2023-11-02 RX ADMIN — MEXILETINE HYDROCHLORIDE 200 MILLIGRAM(S): 150 CAPSULE ORAL at 17:20

## 2023-11-02 RX ADMIN — Medication 2 MILLILITER(S): at 22:37

## 2023-11-02 RX ADMIN — Medication 1: at 12:18

## 2023-11-02 RX ADMIN — FAMOTIDINE 20 MILLIGRAM(S): 10 INJECTION INTRAVENOUS at 12:24

## 2023-11-02 RX ADMIN — MEXILETINE HYDROCHLORIDE 200 MILLIGRAM(S): 150 CAPSULE ORAL at 08:43

## 2023-11-02 RX ADMIN — BUDESONIDE AND FORMOTEROL FUMARATE DIHYDRATE 2 PUFF(S): 160; 4.5 AEROSOL RESPIRATORY (INHALATION) at 17:18

## 2023-11-02 RX ADMIN — Medication 600 MILLIGRAM(S): at 17:19

## 2023-11-02 NOTE — PROGRESS NOTE ADULT - ASSESSMENT
73y (1950) woman with a PMHx significant for asthma on chronic steroids, DM, bronchiectasis, tracheomalacia s/p tracheoplasty, pAFib, colorectal cancer s/p colectomy and ostomy, s/p AVR (2022) and TAVR- [facility] 9 on 9/6, recent shingles and covid infection, presents with word finding difficulties and s/p two days of AMS, now at baseline.    Impression:   - Etiology for encephalopathy is most consistent with toxic/metabolic process either due to acute medical issues, iatrogenic (ertapenem), or seizure. MRI brain personally reviewed by me with extensive small vessel ischemic/periventricular white matter disease and microhemorrhages but no acute intracranial event. L hand numbness due to carpal tunnel syndrome and L foot numbness likely also secondary to peripheral nerve process. L frontal temporal sharp waves recorded, but no seizure on EEG. Mentation is much improved at this time    Recommendations:  - vEEG 11/1-11/2: L frontal temporal sharp waves, would recommend continue EEG for another 24 hours and discontinue if no worsening of sharp waves or seizure.  - given abnormal EEG findings, please start Keppra 500mg BID   - Check vitamin B1, B6, B12, ammonia, Cu, ESR, CRP, Zn, Vit , Folate->20, Lactate 3.4, HgA1C:5.7, TSH-0.49, FT4-6.0, UA-neg, Hyperglycemic, RPR neg, - MRI Brain: Negative for recent infarct.White matter microangiopathic change is mildly increased comparing to 2021 and there are scattered nonspecific microhemorrhages. Patient can follow up with vascular neurologist, Dr. Zohaib Anthony, outpatient for further management/workup of nonspecific chronic microhemorrhages.  - Vitamin D levels low, would replete with D3 2000 Units PO daily for 3 months and then recheck new levels  - L nocturnal neutral wrist splint  - Discussed results and plans with primary medicine team.  - Discussed results and plans with patient and her daughter, all questions answered.  - Continue to address above medical problems, as you are doing  - Will continue to follow patient with you .    Plans discussed with neurology attending, Dr. Blanchard     73y (1950) woman with a PMHx significant for asthma on chronic steroids, DM, bronchiectasis, tracheomalacia s/p tracheoplasty, pAFib, colorectal cancer s/p colectomy and ostomy, s/p AVR (2022) and TAVR- [facility] 9 on 9/6, recent shingles and covid infection, presents with word finding difficulties and s/p two days of AMS, now at baseline.    Impression:   - Etiology for encephalopathy is most consistent with toxic/metabolic process either due to acute medical issues, iatrogenic (ertapenem), or seizure. MRI brain personally reviewed by me with extensive small vessel ischemic/periventricular white matter disease and microhemorrhages but no acute intracranial event. L hand numbness due to carpal tunnel syndrome and L foot numbness likely also secondary to peripheral nerve process. L frontal temporal sharp waves recorded, but no seizure on EEG. Mentation is much improved at this time    Recommendations:  - vEEG 11/1-11/2: L frontal temporal sharp waves, would recommend continue EEG for another 24 hours and discontinue if no worsening of sharp waves or seizure.  - given abnormal EEG findings, please start Keppra 500mg BID   - Please get MRI Brain with con given above EEG finding.  - Check vitamin B1, B6, B12, ammonia, Cu, ESR, CRP, Zn, Vit , Folate->20, Lactate 3.4, HgA1C:5.7, TSH-0.49, FT4-6.0, UA-neg, Hyperglycemic, RPR neg, - MRI Brain: Negative for recent infarct.White matter microangiopathic change is mildly increased comparing to 2021 and there are scattered nonspecific microhemorrhages. Patient can follow up with vascular neurologist, Dr. Zohaib Anthony, outpatient for further management/workup of nonspecific chronic microhemorrhages.  - Vitamin D levels low, would replete with D3 2000 Units PO daily for 3 months and then recheck new levels  - L nocturnal neutral wrist splint  - Discussed results and plans with primary medicine team.  - Discussed results and plans with patient and her daughter, all questions answered.  - Continue to address above medical problems, as you are doing  - Will continue to follow patient with you .    Plans discussed with neurology attending, Dr. Blanchard

## 2023-11-02 NOTE — EEG REPORT - NS EEG TEXT BOX
[address]  [address] [facility]  [phone]    [place]  HISTORY & PHYSICAL        NAME: Brodie Levine  AGE: 74 y o  SEX: male  : 1947  DATE OF ENCOUNTER: 20  POS: 31 (SNF)  PCP: Merritt Jordan DO    Chief Complaint     Seen and examined today for admission to short term rehabilitation unit at [address] [place] [address]  History of Present Illness     40-year-old gentleman with depression with anxiety, chronic low back pain, hypercholesterolemia, hypertension, obsessive-compulsive disorder, prior history of cervical fusion with posterior cervical decompressive laminectomy and instrumented posterior lateral fusion and fixation C3-6 in 2018  He was admitted to [facility] 2020-2020 after he had presented to the ED complaining of generalized weakness, constipation and urinary retention  Upon initial evaluation he was found to be hyponatremic to 129  Patient with underlying OCD, per records reviewed it seems like patient was drinking excessive amounts of water leading to hyponatremia  He seems to be obsessed with bowel movements  He was evaluated by psychiatry during this hospitalization recommended outpatient psychotherapy and to continue current medication management with Zoloft, Zyprexa, Remeron and buspirone  Per psychiatry note reviewed, dated 2020, patient has a longstanding history of obsessive-compulsive disorder and his mood is obsessed about urinary retention  Patient was found to have tangential and obsessive thoughts, particularly obsessed with his medical issues  They stated patient was not appropriate and would not benefit from an inpatient psych admission no other intervention was recommended at that point  patient had also presented with complaints of urinary retention and initially a cristina catheter was placed, discontinued on    he was then started on flomax and was voiding withoutr difficulty in the RAYRAY RODRIGUEZ [id number]     Study Date: 11-01-23 1700 - 0800	11-02-23  Duration x Hours:  14h 16 mins  --------------------------------------------------------------------------------------------------  History:  CC/ HPI Patient is a 73y old  Female who presents with a chief complaint of ams x 2 days (01 Nov 2023 15:30)    MEDICATIONS  (STANDING):  acetylcysteine 10%  Inhalation 2 milliLiter(s) Inhalation every 8 hours  albuterol/ipratropium for Nebulization 3 milliLiter(s) Nebulizer every 6 hours  ascorbic acid 500 milliGRAM(s) Oral daily  atorvastatin 20 milliGRAM(s) Oral at bedtime  budesonide 160 MICROgram(s)/formoterol 4.5 MICROgram(s) Inhaler 2 Puff(s) Inhalation two times a day  clopidogrel Tablet 75 milliGRAM(s) Oral daily  dextrose 5%. 1000 milliLiter(s) (50 mL/Hr) IV Continuous <Continuous>  dextrose 5%. 1000 milliLiter(s) (100 mL/Hr) IV Continuous <Continuous>  dextrose 50% Injectable 12.5 Gram(s) IV Push once  dextrose 50% Injectable 25 Gram(s) IV Push once  ergocalciferol 08963 Unit(s) Oral every week  ertapenem  IVPB      ertapenem  IVPB 1000 milliGRAM(s) IV Intermittent every 24 hours  famotidine    Tablet 20 milliGRAM(s) Oral daily  gabapentin 100 milliGRAM(s) Oral every 8 hours  glucagon  Injectable 1 milliGRAM(s) IntraMuscular once  guaiFENesin  milliGRAM(s) Oral every 12 hours  influenza  Vaccine (HIGH DOSE) 0.7 milliLiter(s) IntraMuscular once  insulin glargine Injectable (LANTUS) 26 Unit(s) SubCutaneous at bedtime  insulin lispro (ADMELOG) corrective regimen sliding scale   SubCutaneous at bedtime  insulin lispro (ADMELOG) corrective regimen sliding scale   SubCutaneous three times a day before meals  insulin lispro Injectable (ADMELOG) 17 Unit(s) SubCutaneous three times a day before meals  lactulose Syrup 20 Gram(s) Oral two times a day  methylPREDNISolone 32 milliGRAM(s) Oral daily  mexiletine 200 milliGRAM(s) Oral every 8 hours  montelukast 10 milliGRAM(s) Oral daily  multivitamin 1 Tablet(s) Oral daily  pantoprazole    Tablet 40 milliGRAM(s) Oral before breakfast  polyethylene glycol 3350 17 Gram(s) Oral daily  senna 2 Tablet(s) Oral at bedtime  sodium chloride 3%  Inhalation 4 milliLiter(s) Inhalation every 6 hours    --------------------------------------------------------------------------------------------------  Study Interpretation:    [[[Abbreviation Key:  PDR=alpha rhythm/posterior dominant rhythm. A-P=anterior posterior.  Amplitude: ‘very low’:<20; ‘low’:20-49; ‘medium’:; ‘high’:>150uV.  Persistence for periodic/rhythmic patterns (% of epoch) ‘rare’:<1%; ‘occasional’:1-10%; ‘frequent’:10-50%; ‘abundant’:50-90%; ‘continuous’:>90%.  Persistence for sporadic discharges: ‘rare’:<1/hr; ‘occasional’:1/min-1/hr; ‘frequent’:>1/min; ‘abundant’:>1/10 sec.  RPP=rhythmic and periodic patterns; GRDA=generalized rhythmic delta activity; FIRDA=frontal intermittent GRDA; LRDA=lateralized rhythmic delta activity; TIRDA=temporal intermittent rhythmic delta activity;  LPD=PLED=lateralized periodic discharges; GPD=generalized periodic discharges; BIPDs =bilateral independent periodic discharges; Mf=multifocal; SIRPDs=stimulus induced rhythmic, periodic, or ictal appearing discharges; BIRDs=brief potentially ictal rhythmic discharges >4 Hz, lasting .5-10s; PFA (paroxysmal bursts >13 Hz or =8 Hz <10s).  Modifiers: +F=with fast component; +S=with spike component; +R=with rhythmic component.  S-B=burst suppression pattern.  Max=maximal. N1-drowsy; N2-stage II sleep; N3-slow wave sleep. SSS/BETS=small sharp spikes/benign epileptiform transients of sleep. HV=hyperventilation; PS=photic stimulation]]]    Daily EEG Visual Analysis    FINDINGS:      Background:  Continuity: continuous  Symmetry: symmetric  PDR: 8 Hz, with amplitude to 40 uV, that attenuated to eye opening.  Low amplitude frontal beta noted in wakefulness.  Reactivity: present  Voltage: normal  Anterior Posterior Gradient: present  Other background findings: none  Breach: absent    Background Slowing:  Generalized slowing: as above  Focal slowing: Intermittent independent focal delta activity in the left > right frontotemporal region.     State Changes:   -Drowsiness noted with increased slowing, attenuation of fast activity, vertex transients.  -Present with N2 sleep transients with symmetric spindles and K-complexes.    Sporadic Epileptiform Discharges:    Occasional left frontotemporal sharp waves (F7/T7 with field to Fp1 and F3)    Rhythmic and Periodic Patterns (RPPs):  None     Electrographic and Electroclinical seizures:  None    Other Clinical Events:  None    Activation Procedures:   -Hyperventilation was not performed.    -Photic stimulation was not performed.     Artifacts:  Intermittent myogenic and movement artifacts were noted.    ECG:  Single channel ECG  predominantly between xx BPM.    EEG Classification / Summary:  Abnormal  EEG in the awake / drowsy / asleep state(s).  - Left frontotemporal sharp waves  - Independent left > right frontotemporal slowing  - Background slowing, diffuse, mild    Clinical Impression:  - Risk for focal seizures from the left frontotemporal region.  - Independent left > right focal cerebral dysfunction can be structural or functional in etiology.  - Mild diffuse cerebral dysfunction, nonspecific in etiology.    This is fellow preliminary read, pending attending review.  -------------------------------------------------------------------------------------------------------  [facility] EEG Reading Room Ph#: [phone]  Epilepsy Answering Service after 5PM and before 8:30AM: Ph#: [phone]    OMAR Keller  Epilepsy Fellow   hospital     Patient was considered medically stable for several days in the hospital  however, per discharge summary he had refused to leave  Recommended subacute rehab therapy and once agreeable he was discharged to [facility]  Today, patient was seen and examined for admission to [facility] short term rehab unit  Mr Guadalupe Homans is sitting comfortably in chair  He reports he is feeling pretty worried and hopeless, states no one can figure out what is wrong with him and he is concerned about his health issues  He has been voiding without difficulty, no bowel movement in 2 days  Denies any fever/chills, chest pain/shortness of breath, abdominal discomfort, nausea/vomiting, diarrhea or dysuria  Review of Systems     Review of Systems   Constitutional: Negative for activity change, appetite change, fatigue and unexpected weight change  HENT: Negative for congestion, dental problem, hearing loss, mouth sores, trouble swallowing and voice change  Eyes: Negative for visual disturbance  Respiratory: Negative for cough, chest tightness and shortness of breath  Cardiovascular: Negative for chest pain, palpitations and leg swelling  Gastrointestinal: Positive for constipation  Negative for abdominal distention, abdominal pain, diarrhea, nausea and vomiting  Genitourinary: Negative for dysuria  Musculoskeletal: Negative for arthralgias, back pain and gait problem  Skin: Negative for color change, pallor, rash and wound  Neurological: Negative for weakness and light-headedness  Psychiatric/Behavioral: The patient is nervous/anxious  All other systems reviewed and are negative        History   No Known Allergies    Past Medical History:   Diagnosis Date    Anxiety     Chronic low back pain     Depression     Hypercholesterolemia     Hypertension     OCD (obsessive compulsive disorder)      Past Surgical History:   Procedure Laterality Date    CERVICAL FUSION Bilateral 11/19/2018 Procedure: Posterior cervical decompressive laminectomy and instrumented posterior lateral fusion fixation C3-6;  Surgeon: Mike Barr MD;  Location: [facility];  Service: Neurosurgery    HERNIA REPAIR         Family History: non-contributory  Social History     Tobacco Use    Smoking status: Never Smoker    Smokeless tobacco: Never Used   Substance Use Topics    Alcohol use: No    Drug use: No         He lives at home with his son  Objective   Vital Signs   BP:   113/77    HR:  65     T:  97 8°     RR:  18     O2Sat:  99% on RA      W:  135 7 lb    Physical Exam   Constitutional: He is oriented to person, place, and time  He appears well-developed  No distress  HENT:   Head: Normocephalic and atraumatic  Right Ear: External ear normal    Left Ear: External ear normal    Mouth/Throat: Oropharynx is clear and moist  No oropharyngeal exudate  Eyes: Pupils are equal, round, and reactive to light  Conjunctivae and EOM are normal  No scleral icterus  Neck: Neck supple  No JVD present  No thyromegaly present  Cardiovascular: Normal rate and regular rhythm  Murmur heard  Pulmonary/Chest: Effort normal and breath sounds normal  No respiratory distress  Abdominal: Soft  Bowel sounds are normal  He exhibits no distension  There is no tenderness  Musculoskeletal: Normal range of motion  He exhibits no edema, tenderness or deformity  Lymphadenopathy:     He has no cervical adenopathy  Neurological: He is alert and oriented to person, place, and time  He has normal reflexes  No cranial nerve deficit  Skin: Skin is warm and dry  No rash noted  He is not diaphoretic  No erythema  No pallor  Psychiatric:   Seems worried  Poor judgement/thought content  Vitals reviewed          Pertinent Laboratory/Diagnostic Studies:     Lab Results   Component Value Date    WBC 6 18 03/01/2020    HGB 12 7 03/01/2020    HCT 37 2 03/01/2020    MCV 93 03/01/2020     03/01/2020     Lab Results   Component Value Date    CALCIUM 8 6 03/02/2020    K 3 5 03/02/2020    CO2 24 03/02/2020    CL 98 (L) 03/02/2020    BUN 6 03/02/2020    CREATININE 0 70 03/02/2020     Lab Results   Component Value Date    ADI2OSJTCQSZ 1 691 02/26/2020     Lab Results   Component Value Date    HGBA1C 4 7 11/14/2018         Current Medications       All medications reviewed and updated in [facility] EMR/Chart  Assessment and Plan     Hyponatremia  · Patient presented on admission with low sodium levels down to 129  · It was felt hyponatremia was related to patient drinking excessive amounts of water due to his obsession with bowel movements  · Medications were reviewed and he was felt to have been stable on SSRIs for many years, less likely that SSRIs are the cause of his hyponatremia  · His zoloft was continued at current dose  · Sodium level slowly improving  Last checked on 3/1/2020 borderline low at 135  · Check BMP to monitor  · May require fluid restrictions if sodium trending down  · Monitor fluid intake  Chronic idiopathic constipation  · Currently stable  · Continue bowel regimen and PRN medications  · Adequate hydration  · Encourage physical activity  OCD (obsessive compulsive disorder)  · Patient with severe OCD and focused on his bowel movements as well as his medical issues  · Evaluated by Psychiatry during this hospitalization, no intervention recommended  · He is to continue current regimen with zoloft, zyprexa, Remeron and buspirone  · Recommendation made for outpatient psychotherapy, will consult [facility] services to evaluate and treat as appropriate  Orthostatic hypotension  · Unclear etiology  · Hx of orthostasis treated with midodrine for short period of time back in January 2020  · Monitor BPs  · Not currently on medications  · However, patient recently started on tamsulosin for urinary retention, monitor BP closely  Hypertension  · BP stable and well contrrolled    · Not currently on RAYRAY RODRIGUEZ [id number]     Study Date: 11-01-23 	1700 - 0800	11-02-23  Duration x Hours:  14 hr 16 min  --------------------------------------------------------------------------------------------------  History:  CC/ HPI Patient is a 73y old  Female who presents with a chief complaint of ams x 2 days (01 Nov 2023 15:30)    MEDICATIONS  (STANDING):  acetylcysteine 10%  Inhalation 2 milliLiter(s) Inhalation every 8 hours  albuterol/ipratropium for Nebulization 3 milliLiter(s) Nebulizer every 6 hours  ascorbic acid 500 milliGRAM(s) Oral daily  atorvastatin 20 milliGRAM(s) Oral at bedtime  budesonide 160 MICROgram(s)/formoterol 4.5 MICROgram(s) Inhaler 2 Puff(s) Inhalation two times a day  clopidogrel Tablet 75 milliGRAM(s) Oral daily  dextrose 5%. 1000 milliLiter(s) (50 mL/Hr) IV Continuous <Continuous>  dextrose 5%. 1000 milliLiter(s) (100 mL/Hr) IV Continuous <Continuous>  dextrose 50% Injectable 12.5 Gram(s) IV Push once  dextrose 50% Injectable 25 Gram(s) IV Push once  ergocalciferol 18649 Unit(s) Oral every week  ertapenem  IVPB      ertapenem  IVPB 1000 milliGRAM(s) IV Intermittent every 24 hours  famotidine    Tablet 20 milliGRAM(s) Oral daily  gabapentin 100 milliGRAM(s) Oral every 8 hours  glucagon  Injectable 1 milliGRAM(s) IntraMuscular once  guaiFENesin  milliGRAM(s) Oral every 12 hours  influenza  Vaccine (HIGH DOSE) 0.7 milliLiter(s) IntraMuscular once  insulin glargine Injectable (LANTUS) 26 Unit(s) SubCutaneous at bedtime  insulin lispro (ADMELOG) corrective regimen sliding scale   SubCutaneous at bedtime  insulin lispro (ADMELOG) corrective regimen sliding scale   SubCutaneous three times a day before meals  insulin lispro Injectable (ADMELOG) 17 Unit(s) SubCutaneous three times a day before meals  lactulose Syrup 20 Gram(s) Oral two times a day  methylPREDNISolone 32 milliGRAM(s) Oral daily  mexiletine 200 milliGRAM(s) Oral every 8 hours  montelukast 10 milliGRAM(s) Oral daily  multivitamin 1 Tablet(s) Oral daily  pantoprazole    Tablet 40 milliGRAM(s) Oral before breakfast  polyethylene glycol 3350 17 Gram(s) Oral daily  senna 2 Tablet(s) Oral at bedtime  sodium chloride 3%  Inhalation 4 milliLiter(s) Inhalation every 6 hours    --------------------------------------------------------------------------------------------------  Study Interpretation:    [[[Abbreviation Key:  PDR=alpha rhythm/posterior dominant rhythm. A-P=anterior posterior.  Amplitude: ‘very low’:<20; ‘low’:20-49; ‘medium’:; ‘high’:>150uV.  Persistence for periodic/rhythmic patterns (% of epoch) ‘rare’:<1%; ‘occasional’:1-10%; ‘frequent’:10-50%; ‘abundant’:50-90%; ‘continuous’:>90%.  Persistence for sporadic discharges: ‘rare’:<1/hr; ‘occasional’:1/min-1/hr; ‘frequent’:>1/min; ‘abundant’:>1/10 sec.  RPP=rhythmic and periodic patterns; GRDA=generalized rhythmic delta activity; FIRDA=frontal intermittent GRDA; LRDA=lateralized rhythmic delta activity; TIRDA=temporal intermittent rhythmic delta activity;  LPD=PLED=lateralized periodic discharges; GPD=generalized periodic discharges; BIPDs =bilateral independent periodic discharges; Mf=multifocal; SIRPDs=stimulus induced rhythmic, periodic, or ictal appearing discharges; BIRDs=brief potentially ictal rhythmic discharges >4 Hz, lasting .5-10s; PFA (paroxysmal bursts >13 Hz or =8 Hz <10s).  Modifiers: +F=with fast component; +S=with spike component; +R=with rhythmic component.  S-B=burst suppression pattern.  Max=maximal. N1-drowsy; N2-stage II sleep; N3-slow wave sleep. SSS/BETS=small sharp spikes/benign epileptiform transients of sleep. HV=hyperventilation; PS=photic stimulation]]]    Daily EEG Visual Analysis    FINDINGS:      Background:  Continuity: continuous  Symmetry: symmetric  PDR: 8.5 Hz, attenuates to eye opening.  Low-amplitude frontal beta in wakefulness.  State change: present  Voltage: normal  Anterior Posterior Gradient: present  Other background findings: none  Breach: absent    Background Slowing:  Generalized slowing: None  Focal slowing: Occasional independent focal polymorphic delta activity in the left > right frontotemporal regions, at times semi-rhythmic    State Changes:   Drowsiness is characterized by fragmentation, attenuation, and slowing of the background activity.  Stage 2 sleep is characterized by symmetric K complexes and sleep spindles.     Sporadic Epileptiform Discharges:    Occasional left frontotemporal sharp waves (F7>T7).  Occasional right frontotemporal (F8>T8) spikes/sharp waves.    Rhythmic and Periodic Patterns (RPPs):  None     Electrographic and Electroclinical seizures:  None    Other Clinical Events:  None    Activation Procedures:   -Hyperventilation was not performed.    -Photic stimulation was not performed.     Artifacts:  Intermittent myogenic and movement artifacts were noted.    Single-lead EKG: Regular rhythm    EEG Classification / Summary:  Abnormal video-EEG in the awake / drowsy / asleep state(s).  - Occasional left and right frontotemporal spikes/sharp waves  - Independent left > right frontotemporal slowing    Clinical Impression:  - Potentially epileptogenic foci in the left and right frontotemporal regions  - Independent left > right focal cerebral dysfunction can be structural or functional in etiology.      -------------------------------------------------------------------------------------------------------  [facility] EEG Reading Room Ph#: [phone]  Epilepsy Answering Service after 5PM and before 8:30AM: Ph#: [phone]    OMAR Keller  Epilepsy Fellow    Leida Zuniga MD  Attending Physician, [facility] Epilepsy [place]  medications  · Monitor blood pressures  Depression  · Evaluate by Psychiatry in the hospital  Recommended to continue current regimen with zoloft, zyprexa, remeron and buspar  · Will consult psychology for evaluation  Physical deconditioning  · PT/OT to evaluate and treat as appropriate  · Patient lives at home with son, and as per records, son has expressed concern since he is not able to provide care at home anymore  ·  to assist with safe discharge planning  Difficulty voiding  · Patient presented to the ED complaining of urinary retention, initially treated with cristina catheter in the hospital, [address] [place] [address] MD  Geriatric Medicine  03/05/20    Please note:  Voice-recognition software may have been used in the preparation of this document  Occasional wrong word or "sound-alike" substitutions may have occurred due to the inherent limitations of voice recognition software  Interpretation should be guided by context

## 2023-11-02 NOTE — PROGRESS NOTE ADULT - PROBLEM SELECTOR PLAN 5
CT Chest - Mucoid impaction and atelectasis    - Pulmonary consult, appreciate recs  - Start airway clearance: humidified oxygen, duonebs q6h, 3% nebulized saline q6h, chest PT q8 h, aerobika/acapella q6 h, chest vest q12h  - titrate oxygen to O2 >88%, use humidified oxygen  - incentive spirometry, OOB to chair, PT/OT  - Sputum culture with ESBL proteus mirabilis   - C/w Ertapenem, PO benadryl 30 minutes prior to administration (10/30- ) Plan for 10 day course

## 2023-11-02 NOTE — PROGRESS NOTE ADULT - PROBLEM SELECTOR PLAN 3
Taking 7.5mg at home  - monitor daily INR, coumadin dosing based on that  - Continue with Plavix 75 mg PO daily   - Continue with Mexiletine 200 mg every 8 hours   - Continue with Atorvastatin 20 mg PO HS

## 2023-11-02 NOTE — PROGRESS NOTE ADULT - SUBJECTIVE AND OBJECTIVE BOX
NEUROLOGY FOLLOW-UP CONSULT NOTE    RFC: expressive aphasia    Interval history: No acute neurologic events overnight. Patient reports feeling better today, denies any further episode of expressive aphasia.    Meds:  MEDICATIONS  (STANDING):  acetylcysteine 10%  Inhalation 2 milliLiter(s) Inhalation every 8 hours  albuterol/ipratropium for Nebulization 3 milliLiter(s) Nebulizer every 6 hours  ascorbic acid 500 milliGRAM(s) Oral daily  atorvastatin 20 milliGRAM(s) Oral at bedtime  budesonide 160 MICROgram(s)/formoterol 4.5 MICROgram(s) Inhaler 2 Puff(s) Inhalation two times a day  clopidogrel Tablet 75 milliGRAM(s) Oral daily  dextrose 5%. 1000 milliLiter(s) (100 mL/Hr) IV Continuous <Continuous>  dextrose 5%. 1000 milliLiter(s) (50 mL/Hr) IV Continuous <Continuous>  dextrose 50% Injectable 12.5 Gram(s) IV Push once  dextrose 50% Injectable 25 Gram(s) IV Push once  ergocalciferol 21876 Unit(s) Oral every week  ertapenem  IVPB      ertapenem  IVPB 1000 milliGRAM(s) IV Intermittent every 24 hours  famotidine    Tablet 20 milliGRAM(s) Oral daily  gabapentin 100 milliGRAM(s) Oral every 8 hours  glucagon  Injectable 1 milliGRAM(s) IntraMuscular once  guaiFENesin  milliGRAM(s) Oral every 12 hours  influenza  Vaccine (HIGH DOSE) 0.7 milliLiter(s) IntraMuscular once  insulin glargine Injectable (LANTUS) 26 Unit(s) SubCutaneous at bedtime  insulin lispro (ADMELOG) corrective regimen sliding scale   SubCutaneous at bedtime  insulin lispro (ADMELOG) corrective regimen sliding scale   SubCutaneous three times a day before meals  insulin lispro Injectable (ADMELOG) 17 Unit(s) SubCutaneous three times a day before meals  lactulose Syrup 20 Gram(s) Oral two times a day  mexiletine 200 milliGRAM(s) Oral every 8 hours  montelukast 10 milliGRAM(s) Oral daily  multivitamin 1 Tablet(s) Oral daily  pantoprazole    Tablet 40 milliGRAM(s) Oral before breakfast  polyethylene glycol 3350 17 Gram(s) Oral daily  senna 2 Tablet(s) Oral at bedtime  sodium chloride 3%  Inhalation 4 milliLiter(s) Inhalation every 6 hours    MEDICATIONS  (PRN):  acetaminophen     Tablet .. 650 milliGRAM(s) Oral every 6 hours PRN Temp greater or equal to 38C (100.4F), Mild Pain (1 - 3)  dextrose Oral Gel 15 Gram(s) Oral once PRN Blood Glucose LESS THAN 70 milliGRAM(s)/deciliter  diphenhydrAMINE 50 milliGRAM(s) Oral daily PRN Allergy symptoms  melatonin 3 milliGRAM(s) Oral at bedtime PRN Insomnia  ondansetron Injectable 4 milliGRAM(s) IV Push every 8 hours PRN Nausea and/or Vomiting      PMHx/PSHx/FHx/SHx:  Altered mental status    H/o or current diagnosis of HF- ACEI/ARB contraindication unknown    Family history of asthma    Family history of breast cancer (Sibling)    Family history of diabetes mellitus type II      Atrial fibrillation    Diabetes    Hypertension    COPD (chronic obstructive pulmonary disease)    Adrenal insufficiency    Aortic insufficiency    Pelvic fracture    Asthma    Hypertension    Tracheobronchomalacia    Colorectal cancer    Aortic disease    Rectal bleeding    Seizure    DVT (deep venous thrombosis)    TIA (transient ischemic attack)    Altered mental status    Altered mental state    Asthma    Bronchiectasis    Diabetes mellitus    Chronic atrial fibrillation    Paroxysmal atrial fibrillation    S/P TAVR (transcatheter aortic valve replacement)    2019 novel coronavirus disease (COVID-19)    History of partial hysterectomy    H/O total knee replacement, bilateral    History of sinus surgery    Exostosis of orbit, left    H/O pelvic surgery    History of surgery    History of tracheomalacia    S/P bronchoscopy    Rectal bleeding    S/P TAVR (transcatheter aortic valve replacement)    S/P aortic valve replacement    DIFFICULTY AMB      Allergies:  penicillin (Anaphylaxis)  iodine (Short breath; Swelling)  tetanus toxoid (Short breath)  Avelox (Short breath; Pruritus)  aspirin (Short breath)  Dilaudid (Short breath)  codeine (Short breath)  Valium (Short breath)  shellfish (Anaphylaxis)  cefepime (Anaphylaxis)      ROS: All systems negative except as documented in Interval history    O:  T(C): 36.8 (11-02-23 @ 12:31), Max: 36.8 (11-02-23 @ 12:31)  HR: 77 (11-02-23 @ 12:31) (73 - 91)  BP: 132/50 (11-02-23 @ 12:31) (119/71 - 144/60)  RR: 17 (11-02-23 @ 12:31) (17 - 18)  SpO2: 99% (11-02-23 @ 12:31) (96% - 99%)    Focused neurologic exam:  MS - AAO x3, speech fluent, rep/naming intact, follows commands, attn/conc/recent and remote memory/fund of knowledge WNL  CN - PERRLA, EOMI, VFF, face sens/str/hearing WNL b/l, tongue/palate midline, trap 5/5 b/l  Motor - Normal bulk/tone, 5/5 all  Sens - LT/temp/vib intact all  DTR's - 2+ all and downgoing b/l plantar response  Coord - FtN intact b/l  Gait and station - Not assessed due to fall risk    Pertinent labs/studies:    LABS:  cret                        10.0   12.19 )-----------( 349      ( 02 Nov 2023 11:27 )             35.4     11-02    139  |  102  |  19  ----------------------------<  282<H>  5.2   |  25  |  0.60    Ca    8.9      02 Nov 2023 11:27  Phos  2.3     11-01  Mg     2.0     11-01    TPro  6.9  /  Alb  4.1  /  TBili  0.1<L>  /  DBili  x   /  AST  24  /  ALT  18  /  AlkPhos  89  11-01    PT/INR - ( 02 Nov 2023 11:27 )   PT: 20.8 sec;   INR: 2.02 ratio         < from: CT Head No Cont (10.27.23 @ 19:57) >  FINDINGS:    No acute intracranial hemorrhage, midline shift, or mass effect.    Age-related involutional changes with mild bihemispheric chronic   microvascular changes. Intracranial atherosclerosis.    Status post functional endoscopic sinus surgery. The mastoid air cells   are clear. The right globe demonstrates proptosis with an ocular lens   replacement, similar to prior exam. Left ocular prosthesis.    The soft tissues of the scalp are unremarkable. The calvarium is intact.    Consider MRI of the brain as clinically warranted    IMPRESSION:    No acute intracranial hemorrhage, brain edema, or mass effect.    < end of copied text >      < from: MR Head No Cont (10.29.23 @ 16:52) >  FINDINGS:    The ventricles, cisternal spaces, and cortical sulci to be appropriate   for the patient's stated age. There is no midline shift or extra-axial   collection.    On T2-FLAIR imaging there is mild small vessel ischemic type signal and   periventricular white matter slightly increased and more conspicuous due   to differing scanner technique. However, there is a newly conspicuous   focus at left frontal subcortical region.    The diffusion-weighted images demonstrate no evidence of recent ischemic   injury. The susceptibility weighted images demonstrate a couple punctate   sites of old microhemorrhages are scattered in multifocal cerebral lobes;   query amyloid deposition or tiny cavernous malformations. These are not   visible on the 2018 study. There are preserved large vessel flow-voids.    There is similar chronic. Appearance is sinuses. Mastoids are clear. Left   ocular prosthesis also noted.      IMPRESSION:    Negative for recent infarct.  White matter microangiopathic change is mildly increased comparing to   2021 and there are scattered nonspecific microhemorrhages.    < end of copied text >  < from: MR Head No Cont (10.29.23 @ 16:52) >  FINDINGS:    The ventricles, cisternal spaces, and cortical sulci to be appropriate   for the patient's stated age. There is no midline shift or extra-axial   collection.    On T2-FLAIR imaging there is mild small vessel ischemic type signal and   periventricular white matter slightly increased and more conspicuous due   to differing scanner technique. However, there is a newly conspicuous   focus at left frontal subcortical region.    The diffusion-weighted images demonstrate no evidence of recent ischemic   injury. The susceptibility weighted images demonstrate a couple punctate   sites of old microhemorrhages are scattered in multifocal cerebral lobes;   query amyloid deposition or tiny cavernous malformations. These are not   visible on the 2018 study. There are preserved large vessel flow-voids.    There is similar chronic. Appearance is sinuses. Mastoids are clear. Left   ocular prosthesis also noted.      IMPRESSION:    Negative for recent infarct.  White matter microangiopathic change is mildly increased comparing to   2021 and there are scattered nonspecific microhemorrhages.    < end of copied text >    Study Date: 11-01-23 	1700 - 0800	11-02-23  Duration x Hours:  14h 16 mins  EEG Classification / Summary:  Abnormal  EEG in the awake / drowsy / asleep state(s).  - Left frontotemporal sharp waves  - Independent left > right frontotemporal slowing  - Background slowing, diffuse, mild    Clinical Impression:  - Risk for focal seizures from the left frontotemporal region.  - Independent left > right focal cerebral dysfunction can be structural or functional in etiology.  - Mild diffuse cerebral dysfunction, nonspecific in etiology.

## 2023-11-02 NOTE — PROGRESS NOTE ADULT - PROBLEM SELECTOR PLAN 1
unsteady gait w/ episodic confusion, now at baseline mental state, no acute findings on CT Head ,no focal deficits on exam, mild leukocytosis but no other evidence for acute infection, hyperglycemic but no other majormetabolic derangements  - Neuro-checks   - MR brain ordered - no recent infarct, white matter microangiopathic change mildly increased from 2021, scattered nonspecific microhemorrhage.   - Neurology consult - ordered vitamin B1, B6, ammonia, Cu, ESR, CRP, Zn.   - B12, folate, RPR wnl  - Vit D at 25, started on 50,000 Units weekly x8 weeks  - fall precautions   - PT  -F/u EEG report

## 2023-11-02 NOTE — PROGRESS NOTE ADULT - SUBJECTIVE AND OBJECTIVE BOX
DATE OF SERVICE: 11-02-23 @ 12:15    Patient is a 73y old  Female who presents with a chief complaint of ams x 2 days (01 Nov 2023 15:30)      SUBJECTIVE / OVERNIGHT EVENTS: No acute events overnight, patient seen and examined at bedside. She reports continued improvement in her breathing, cough and sputum production. Patient currently on vEEG. She states she has not had a bowel movement despite lactulose yesterday, will increase today. Patient states her daughter will be able to pick her up on Saturday.     MEDICATIONS  (STANDING):  acetylcysteine 10%  Inhalation 2 milliLiter(s) Inhalation every 8 hours  albuterol/ipratropium for Nebulization 3 milliLiter(s) Nebulizer every 6 hours  ascorbic acid 500 milliGRAM(s) Oral daily  atorvastatin 20 milliGRAM(s) Oral at bedtime  budesonide 160 MICROgram(s)/formoterol 4.5 MICROgram(s) Inhaler 2 Puff(s) Inhalation two times a day  clopidogrel Tablet 75 milliGRAM(s) Oral daily  dextrose 5%. 1000 milliLiter(s) (50 mL/Hr) IV Continuous <Continuous>  dextrose 5%. 1000 milliLiter(s) (100 mL/Hr) IV Continuous <Continuous>  dextrose 50% Injectable 25 Gram(s) IV Push once  dextrose 50% Injectable 12.5 Gram(s) IV Push once  ergocalciferol 76483 Unit(s) Oral every week  ertapenem  IVPB      ertapenem  IVPB 1000 milliGRAM(s) IV Intermittent every 24 hours  famotidine    Tablet 20 milliGRAM(s) Oral daily  gabapentin 100 milliGRAM(s) Oral every 8 hours  glucagon  Injectable 1 milliGRAM(s) IntraMuscular once  guaiFENesin  milliGRAM(s) Oral every 12 hours  influenza  Vaccine (HIGH DOSE) 0.7 milliLiter(s) IntraMuscular once  insulin glargine Injectable (LANTUS) 26 Unit(s) SubCutaneous at bedtime  insulin lispro (ADMELOG) corrective regimen sliding scale   SubCutaneous three times a day before meals  insulin lispro (ADMELOG) corrective regimen sliding scale   SubCutaneous at bedtime  insulin lispro Injectable (ADMELOG) 17 Unit(s) SubCutaneous three times a day before meals  lactulose Syrup 20 Gram(s) Oral two times a day  methylPREDNISolone 32 milliGRAM(s) Oral daily  mexiletine 200 milliGRAM(s) Oral every 8 hours  montelukast 10 milliGRAM(s) Oral daily  multivitamin 1 Tablet(s) Oral daily  pantoprazole    Tablet 40 milliGRAM(s) Oral before breakfast  polyethylene glycol 3350 17 Gram(s) Oral daily  senna 2 Tablet(s) Oral at bedtime  sodium chloride 3%  Inhalation 4 milliLiter(s) Inhalation every 6 hours    MEDICATIONS  (PRN):  acetaminophen     Tablet .. 650 milliGRAM(s) Oral every 6 hours PRN Temp greater or equal to 38C (100.4F), Mild Pain (1 - 3)  dextrose Oral Gel 15 Gram(s) Oral once PRN Blood Glucose LESS THAN 70 milliGRAM(s)/deciliter  diphenhydrAMINE 50 milliGRAM(s) Oral daily PRN Allergy symptoms  melatonin 3 milliGRAM(s) Oral at bedtime PRN Insomnia  ondansetron Injectable 4 milliGRAM(s) IV Push every 8 hours PRN Nausea and/or Vomiting      Vital Signs Last 24 Hrs  T(C): 36.6 (02 Nov 2023 04:56), Max: 36.7 (01 Nov 2023 19:59)  T(F): 97.8 (02 Nov 2023 04:56), Max: 98 (01 Nov 2023 19:59)  HR: 73 (02 Nov 2023 04:56) (73 - 91)  BP: 119/71 (02 Nov 2023 04:56) (112/64 - 144/60)  BP(mean): --  RR: 17 (02 Nov 2023 04:56) (17 - 18)  SpO2: 98% (02 Nov 2023 04:56) (96% - 98%)    Parameters below as of 02 Nov 2023 04:56  Patient On (Oxygen Delivery Method): room air      CAPILLARY BLOOD GLUCOSE      POCT Blood Glucose.: 188 mg/dL (02 Nov 2023 11:55)  POCT Blood Glucose.: 257 mg/dL (02 Nov 2023 08:28)  POCT Blood Glucose.: 260 mg/dL (01 Nov 2023 21:55)  POCT Blood Glucose.: 223 mg/dL (01 Nov 2023 17:19)    I&O's Summary    01 Nov 2023 07:01  -  02 Nov 2023 07:00  --------------------------------------------------------  IN: 530 mL / OUT: 250 mL / NET: 280 mL        PHYSICAL EXAM:  GENERAL: NAD, well-developed  HEAD:  Atraumatic, Normocephalic  EYES: PERRLA  NECK: Supple, No JVD  CHEST/LUNG: Expiratory and inspiratory wheeze, productive cough, course breath sounds, improving  HEART: Regular rate and rhythm  ABDOMEN: Soft, Nontender, Nondistended; colostomy bag in place  EXTREMITIES:  2+ Peripheral Pulses, No edema  NEUROLOGY: non-focal  SKIN: No rashes or lesions      LABS:                        10.0   12.19 )-----------( 349      ( 02 Nov 2023 11:27 )             35.4     11-02    139  |  102  |  19  ----------------------------<  282<H>  5.2   |  25  |  0.60    Ca    8.9      02 Nov 2023 11:27  Phos  2.3     11-01  Mg     2.0     11-01    TPro  6.9  /  Alb  4.1  /  TBili  0.1<L>  /  DBili  x   /  AST  24  /  ALT  18  /  AlkPhos  89  11-01    PT/INR - ( 02 Nov 2023 11:27 )   PT: 20.8 sec;   INR: 2.02 ratio               Urinalysis Basic - ( 02 Nov 2023 11:27 )    Color: x / Appearance: x / SG: x / pH: x  Gluc: 282 mg/dL / Ketone: x  / Bili: x / Urobili: x   Blood: x / Protein: x / Nitrite: x   Leuk Esterase: x / RBC: x / WBC x   Sq Epi: x / Non Sq Epi: x / Bacteria: x        RADIOLOGY & ADDITIONAL TESTS:    Imaging Personally Reviewed:    Consultant(s) Notes Reviewed:      Care Discussed with Consultants/Other Providers:

## 2023-11-03 ENCOUNTER — APPOINTMENT (OUTPATIENT)
Dept: PULMONOLOGY | Facility: CLINIC | Age: 73
End: 2023-11-03

## 2023-11-03 ENCOUNTER — TRANSCRIPTION ENCOUNTER (OUTPATIENT)
Age: 73
End: 2023-11-03

## 2023-11-03 LAB
ALBUMIN SERPL ELPH-MCNC: 3.6 G/DL — SIGNIFICANT CHANGE UP (ref 3.3–5)
ALBUMIN SERPL ELPH-MCNC: 3.6 G/DL — SIGNIFICANT CHANGE UP (ref 3.3–5)
ALP SERPL-CCNC: 81 U/L — SIGNIFICANT CHANGE UP (ref 40–120)
ALP SERPL-CCNC: 81 U/L — SIGNIFICANT CHANGE UP (ref 40–120)
ALT FLD-CCNC: 24 U/L — SIGNIFICANT CHANGE UP (ref 10–45)
ALT FLD-CCNC: 24 U/L — SIGNIFICANT CHANGE UP (ref 10–45)
ANION GAP SERPL CALC-SCNC: 15 MMOL/L — SIGNIFICANT CHANGE UP (ref 5–17)
ANION GAP SERPL CALC-SCNC: 15 MMOL/L — SIGNIFICANT CHANGE UP (ref 5–17)
AST SERPL-CCNC: 27 U/L — SIGNIFICANT CHANGE UP (ref 10–40)
AST SERPL-CCNC: 27 U/L — SIGNIFICANT CHANGE UP (ref 10–40)
BASOPHILS # BLD AUTO: 0.02 K/UL — SIGNIFICANT CHANGE UP (ref 0–0.2)
BASOPHILS # BLD AUTO: 0.02 K/UL — SIGNIFICANT CHANGE UP (ref 0–0.2)
BASOPHILS NFR BLD AUTO: 0.2 % — SIGNIFICANT CHANGE UP (ref 0–2)
BASOPHILS NFR BLD AUTO: 0.2 % — SIGNIFICANT CHANGE UP (ref 0–2)
BILIRUB SERPL-MCNC: <0.1 MG/DL — LOW (ref 0.2–1.2)
BILIRUB SERPL-MCNC: <0.1 MG/DL — LOW (ref 0.2–1.2)
BUN SERPL-MCNC: 20 MG/DL — SIGNIFICANT CHANGE UP (ref 7–23)
BUN SERPL-MCNC: 20 MG/DL — SIGNIFICANT CHANGE UP (ref 7–23)
CALCIUM SERPL-MCNC: 9 MG/DL — SIGNIFICANT CHANGE UP (ref 8.4–10.5)
CALCIUM SERPL-MCNC: 9 MG/DL — SIGNIFICANT CHANGE UP (ref 8.4–10.5)
CHLORIDE SERPL-SCNC: 101 MMOL/L — SIGNIFICANT CHANGE UP (ref 96–108)
CHLORIDE SERPL-SCNC: 101 MMOL/L — SIGNIFICANT CHANGE UP (ref 96–108)
CO2 SERPL-SCNC: 23 MMOL/L — SIGNIFICANT CHANGE UP (ref 22–31)
CO2 SERPL-SCNC: 23 MMOL/L — SIGNIFICANT CHANGE UP (ref 22–31)
CREAT SERPL-MCNC: 0.61 MG/DL — SIGNIFICANT CHANGE UP (ref 0.5–1.3)
CREAT SERPL-MCNC: 0.61 MG/DL — SIGNIFICANT CHANGE UP (ref 0.5–1.3)
EGFR: 93 ML/MIN/1.73M2 — SIGNIFICANT CHANGE UP
EGFR: 93 ML/MIN/1.73M2 — SIGNIFICANT CHANGE UP
EOSINOPHIL # BLD AUTO: 0.02 K/UL — SIGNIFICANT CHANGE UP (ref 0–0.5)
EOSINOPHIL # BLD AUTO: 0.02 K/UL — SIGNIFICANT CHANGE UP (ref 0–0.5)
EOSINOPHIL NFR BLD AUTO: 0.2 % — SIGNIFICANT CHANGE UP (ref 0–6)
EOSINOPHIL NFR BLD AUTO: 0.2 % — SIGNIFICANT CHANGE UP (ref 0–6)
GLUCOSE BLDC GLUCOMTR-MCNC: 219 MG/DL — HIGH (ref 70–99)
GLUCOSE BLDC GLUCOMTR-MCNC: 219 MG/DL — HIGH (ref 70–99)
GLUCOSE BLDC GLUCOMTR-MCNC: 229 MG/DL — HIGH (ref 70–99)
GLUCOSE BLDC GLUCOMTR-MCNC: 229 MG/DL — HIGH (ref 70–99)
GLUCOSE BLDC GLUCOMTR-MCNC: 358 MG/DL — HIGH (ref 70–99)
GLUCOSE BLDC GLUCOMTR-MCNC: 358 MG/DL — HIGH (ref 70–99)
GLUCOSE BLDC GLUCOMTR-MCNC: 63 MG/DL — LOW (ref 70–99)
GLUCOSE BLDC GLUCOMTR-MCNC: 63 MG/DL — LOW (ref 70–99)
GLUCOSE BLDC GLUCOMTR-MCNC: 74 MG/DL — SIGNIFICANT CHANGE UP (ref 70–99)
GLUCOSE BLDC GLUCOMTR-MCNC: 74 MG/DL — SIGNIFICANT CHANGE UP (ref 70–99)
GLUCOSE BLDC GLUCOMTR-MCNC: 78 MG/DL — SIGNIFICANT CHANGE UP (ref 70–99)
GLUCOSE BLDC GLUCOMTR-MCNC: 78 MG/DL — SIGNIFICANT CHANGE UP (ref 70–99)
GLUCOSE BLDC GLUCOMTR-MCNC: 79 MG/DL — SIGNIFICANT CHANGE UP (ref 70–99)
GLUCOSE BLDC GLUCOMTR-MCNC: 79 MG/DL — SIGNIFICANT CHANGE UP (ref 70–99)
GLUCOSE SERPL-MCNC: 327 MG/DL — HIGH (ref 70–99)
GLUCOSE SERPL-MCNC: 327 MG/DL — HIGH (ref 70–99)
HCT VFR BLD CALC: 35 % — SIGNIFICANT CHANGE UP (ref 34.5–45)
HCT VFR BLD CALC: 35 % — SIGNIFICANT CHANGE UP (ref 34.5–45)
HGB BLD-MCNC: 9.9 G/DL — LOW (ref 11.5–15.5)
HGB BLD-MCNC: 9.9 G/DL — LOW (ref 11.5–15.5)
IMM GRANULOCYTES NFR BLD AUTO: 0.8 % — SIGNIFICANT CHANGE UP (ref 0–0.9)
IMM GRANULOCYTES NFR BLD AUTO: 0.8 % — SIGNIFICANT CHANGE UP (ref 0–0.9)
INR BLD: 1.45 RATIO — HIGH (ref 0.85–1.18)
INR BLD: 1.45 RATIO — HIGH (ref 0.85–1.18)
LYMPHOCYTES # BLD AUTO: 0.94 K/UL — LOW (ref 1–3.3)
LYMPHOCYTES # BLD AUTO: 0.94 K/UL — LOW (ref 1–3.3)
LYMPHOCYTES # BLD AUTO: 7.2 % — LOW (ref 13–44)
LYMPHOCYTES # BLD AUTO: 7.2 % — LOW (ref 13–44)
MAGNESIUM SERPL-MCNC: 2.1 MG/DL — SIGNIFICANT CHANGE UP (ref 1.6–2.6)
MAGNESIUM SERPL-MCNC: 2.1 MG/DL — SIGNIFICANT CHANGE UP (ref 1.6–2.6)
MCHC RBC-ENTMCNC: 19.9 PG — LOW (ref 27–34)
MCHC RBC-ENTMCNC: 19.9 PG — LOW (ref 27–34)
MCHC RBC-ENTMCNC: 28.3 GM/DL — LOW (ref 32–36)
MCHC RBC-ENTMCNC: 28.3 GM/DL — LOW (ref 32–36)
MCV RBC AUTO: 70.4 FL — LOW (ref 80–100)
MCV RBC AUTO: 70.4 FL — LOW (ref 80–100)
MONOCYTES # BLD AUTO: 0.63 K/UL — SIGNIFICANT CHANGE UP (ref 0–0.9)
MONOCYTES # BLD AUTO: 0.63 K/UL — SIGNIFICANT CHANGE UP (ref 0–0.9)
MONOCYTES NFR BLD AUTO: 4.8 % — SIGNIFICANT CHANGE UP (ref 2–14)
MONOCYTES NFR BLD AUTO: 4.8 % — SIGNIFICANT CHANGE UP (ref 2–14)
NEUTROPHILS # BLD AUTO: 11.32 K/UL — HIGH (ref 1.8–7.4)
NEUTROPHILS # BLD AUTO: 11.32 K/UL — HIGH (ref 1.8–7.4)
NEUTROPHILS NFR BLD AUTO: 86.8 % — HIGH (ref 43–77)
NEUTROPHILS NFR BLD AUTO: 86.8 % — HIGH (ref 43–77)
NRBC # BLD: 0 /100 WBCS — SIGNIFICANT CHANGE UP (ref 0–0)
NRBC # BLD: 0 /100 WBCS — SIGNIFICANT CHANGE UP (ref 0–0)
PHOSPHATE SERPL-MCNC: 3 MG/DL — SIGNIFICANT CHANGE UP (ref 2.5–4.5)
PHOSPHATE SERPL-MCNC: 3 MG/DL — SIGNIFICANT CHANGE UP (ref 2.5–4.5)
PLATELET # BLD AUTO: 380 K/UL — SIGNIFICANT CHANGE UP (ref 150–400)
PLATELET # BLD AUTO: 380 K/UL — SIGNIFICANT CHANGE UP (ref 150–400)
POTASSIUM SERPL-MCNC: 4.3 MMOL/L — SIGNIFICANT CHANGE UP (ref 3.5–5.3)
POTASSIUM SERPL-MCNC: 4.3 MMOL/L — SIGNIFICANT CHANGE UP (ref 3.5–5.3)
POTASSIUM SERPL-SCNC: 4.3 MMOL/L — SIGNIFICANT CHANGE UP (ref 3.5–5.3)
POTASSIUM SERPL-SCNC: 4.3 MMOL/L — SIGNIFICANT CHANGE UP (ref 3.5–5.3)
PROT SERPL-MCNC: 6.2 G/DL — SIGNIFICANT CHANGE UP (ref 6–8.3)
PROT SERPL-MCNC: 6.2 G/DL — SIGNIFICANT CHANGE UP (ref 6–8.3)
PROTHROM AB SERPL-ACNC: 15.8 SEC — HIGH (ref 9.5–13)
PROTHROM AB SERPL-ACNC: 15.8 SEC — HIGH (ref 9.5–13)
RBC # BLD: 4.97 M/UL — SIGNIFICANT CHANGE UP (ref 3.8–5.2)
RBC # BLD: 4.97 M/UL — SIGNIFICANT CHANGE UP (ref 3.8–5.2)
RBC # FLD: 22.3 % — HIGH (ref 10.3–14.5)
RBC # FLD: 22.3 % — HIGH (ref 10.3–14.5)
SODIUM SERPL-SCNC: 139 MMOL/L — SIGNIFICANT CHANGE UP (ref 135–145)
SODIUM SERPL-SCNC: 139 MMOL/L — SIGNIFICANT CHANGE UP (ref 135–145)
WBC # BLD: 13.03 K/UL — HIGH (ref 3.8–10.5)
WBC # BLD: 13.03 K/UL — HIGH (ref 3.8–10.5)
WBC # FLD AUTO: 13.03 K/UL — HIGH (ref 3.8–10.5)
WBC # FLD AUTO: 13.03 K/UL — HIGH (ref 3.8–10.5)

## 2023-11-03 PROCEDURE — 95718 EEG PHYS/QHP 2-12 HR W/VEEG: CPT

## 2023-11-03 PROCEDURE — 99233 SBSQ HOSP IP/OBS HIGH 50: CPT | Mod: GC

## 2023-11-03 RX ORDER — WARFARIN SODIUM 2.5 MG/1
7.5 TABLET ORAL ONCE
Refills: 0 | Status: COMPLETED | OUTPATIENT
Start: 2023-11-03 | End: 2023-11-03

## 2023-11-03 RX ORDER — LEVETIRACETAM 250 MG/1
1 TABLET, FILM COATED ORAL
Qty: 0 | Refills: 0 | DISCHARGE
Start: 2023-11-03

## 2023-11-03 RX ORDER — ACETYLCYSTEINE 200 MG/ML
4 VIAL (ML) MISCELLANEOUS
Refills: 0 | Status: DISCONTINUED | OUTPATIENT
Start: 2023-11-03 | End: 2023-11-05

## 2023-11-03 RX ORDER — INSULIN GLARGINE 100 [IU]/ML
20 INJECTION, SOLUTION SUBCUTANEOUS ONCE
Refills: 0 | Status: COMPLETED | OUTPATIENT
Start: 2023-11-03 | End: 2023-11-03

## 2023-11-03 RX ORDER — NYSTATIN 500MM UNIT
500000 POWDER (EA) MISCELLANEOUS
Refills: 0 | Status: DISCONTINUED | OUTPATIENT
Start: 2023-11-03 | End: 2023-11-05

## 2023-11-03 RX ADMIN — Medication 500000 UNIT(S): at 17:28

## 2023-11-03 RX ADMIN — Medication 50 MILLIGRAM(S): at 10:06

## 2023-11-03 RX ADMIN — Medication 2 MILLILITER(S): at 08:37

## 2023-11-03 RX ADMIN — Medication 2 MILLILITER(S): at 21:25

## 2023-11-03 RX ADMIN — BUDESONIDE AND FORMOTEROL FUMARATE DIHYDRATE 2 PUFF(S): 160; 4.5 AEROSOL RESPIRATORY (INHALATION) at 17:14

## 2023-11-03 RX ADMIN — LEVETIRACETAM 500 MILLIGRAM(S): 250 TABLET, FILM COATED ORAL at 06:04

## 2023-11-03 RX ADMIN — MEXILETINE HYDROCHLORIDE 200 MILLIGRAM(S): 150 CAPSULE ORAL at 18:36

## 2023-11-03 RX ADMIN — Medication 2 MILLILITER(S): at 13:09

## 2023-11-03 RX ADMIN — Medication 3 MILLILITER(S): at 08:37

## 2023-11-03 RX ADMIN — PANTOPRAZOLE SODIUM 40 MILLIGRAM(S): 20 TABLET, DELAYED RELEASE ORAL at 06:04

## 2023-11-03 RX ADMIN — GABAPENTIN 100 MILLIGRAM(S): 400 CAPSULE ORAL at 17:14

## 2023-11-03 RX ADMIN — Medication 17 UNIT(S): at 18:15

## 2023-11-03 RX ADMIN — LEVETIRACETAM 500 MILLIGRAM(S): 250 TABLET, FILM COATED ORAL at 17:15

## 2023-11-03 RX ADMIN — Medication 17 UNIT(S): at 13:08

## 2023-11-03 RX ADMIN — SODIUM CHLORIDE 4 MILLILITER(S): 9 INJECTION INTRAMUSCULAR; INTRAVENOUS; SUBCUTANEOUS at 17:14

## 2023-11-03 RX ADMIN — Medication 500 MILLIGRAM(S): at 13:10

## 2023-11-03 RX ADMIN — Medication 17 UNIT(S): at 08:39

## 2023-11-03 RX ADMIN — LACTULOSE 20 GRAM(S): 10 SOLUTION ORAL at 17:15

## 2023-11-03 RX ADMIN — SODIUM CHLORIDE 4 MILLILITER(S): 9 INJECTION INTRAMUSCULAR; INTRAVENOUS; SUBCUTANEOUS at 08:37

## 2023-11-03 RX ADMIN — Medication 3 MILLILITER(S): at 17:13

## 2023-11-03 RX ADMIN — BUDESONIDE AND FORMOTEROL FUMARATE DIHYDRATE 2 PUFF(S): 160; 4.5 AEROSOL RESPIRATORY (INHALATION) at 06:25

## 2023-11-03 RX ADMIN — Medication 1 TABLET(S): at 13:11

## 2023-11-03 RX ADMIN — MONTELUKAST 10 MILLIGRAM(S): 4 TABLET, CHEWABLE ORAL at 13:11

## 2023-11-03 RX ADMIN — Medication 2: at 18:14

## 2023-11-03 RX ADMIN — Medication 4 MILLILITER(S): at 17:13

## 2023-11-03 RX ADMIN — SODIUM CHLORIDE 4 MILLILITER(S): 9 INJECTION INTRAMUSCULAR; INTRAVENOUS; SUBCUTANEOUS at 13:09

## 2023-11-03 RX ADMIN — LACTULOSE 20 GRAM(S): 10 SOLUTION ORAL at 08:37

## 2023-11-03 RX ADMIN — Medication 2: at 08:38

## 2023-11-03 RX ADMIN — SENNA PLUS 2 TABLET(S): 8.6 TABLET ORAL at 21:23

## 2023-11-03 RX ADMIN — CLOPIDOGREL BISULFATE 75 MILLIGRAM(S): 75 TABLET, FILM COATED ORAL at 13:10

## 2023-11-03 RX ADMIN — ERTAPENEM SODIUM 120 MILLIGRAM(S): 1 INJECTION, POWDER, LYOPHILIZED, FOR SOLUTION INTRAMUSCULAR; INTRAVENOUS at 10:34

## 2023-11-03 RX ADMIN — Medication 28 MILLIGRAM(S): at 06:24

## 2023-11-03 RX ADMIN — FAMOTIDINE 20 MILLIGRAM(S): 10 INJECTION INTRAVENOUS at 13:10

## 2023-11-03 RX ADMIN — ATORVASTATIN CALCIUM 20 MILLIGRAM(S): 80 TABLET, FILM COATED ORAL at 21:23

## 2023-11-03 RX ADMIN — Medication 600 MILLIGRAM(S): at 17:14

## 2023-11-03 RX ADMIN — Medication 5: at 13:08

## 2023-11-03 RX ADMIN — GABAPENTIN 100 MILLIGRAM(S): 400 CAPSULE ORAL at 08:38

## 2023-11-03 RX ADMIN — Medication 600 MILLIGRAM(S): at 06:04

## 2023-11-03 RX ADMIN — Medication 500000 UNIT(S): at 08:37

## 2023-11-03 RX ADMIN — WARFARIN SODIUM 7.5 MILLIGRAM(S): 2.5 TABLET ORAL at 21:24

## 2023-11-03 RX ADMIN — INSULIN GLARGINE 20 UNIT(S): 100 INJECTION, SOLUTION SUBCUTANEOUS at 23:59

## 2023-11-03 RX ADMIN — MEXILETINE HYDROCHLORIDE 200 MILLIGRAM(S): 150 CAPSULE ORAL at 08:38

## 2023-11-03 RX ADMIN — Medication 3 MILLILITER(S): at 13:08

## 2023-11-03 NOTE — DISCHARGE NOTE PROVIDER - HOSPITAL COURSE
Discharge Summary     Admit Date: 10-27-23  Discharge Date: 11-4-23    Admission diagnoses: Altered mental status    Discharge diagnoses:  Pneumonia     Hospital Course:   For full details, please see H&P, progress notes, consult notes and ancillary notes. Briefly, RAYRAY RODRIGUEZ is a 73y Female with a history of asthma on chronic steroids, bronchiectasis, recurrent PNA, tracheomalacia s/p tracheoplasty, DM  paroxysmal A-fib on coumadin, colorectal cancer s/p colectomy and ostomy, s/p AVR (2022) and TAVR- [facility] 9 on 9/6. The patient's hospital course will be summarized in a problem based format.    # Altered Mental Status  Patient presented with unsteady gait, confusion and word finding difficulty. CT head was performed which did not show any acute findings. There were no focal deficits on exam. She returned to her baseline mental status after one day but continued to have word finding difficulty. MR brain was ordered which showed no recent infarct, white matter microangiopathic change mildly increased from 2021 and scattered nonspecific microhemorrhage. Neurology was consulted and recommended obtaining video eeg which showed L frontal temporal short waves with no evidence of seizure. Patient was started on Keppra 500mg BID and will follow up outpatient with vascular neurology. MRI Brain with IV contrast was also performed prior to discharge which showed ___.     # Pneumonia   Patient with shortness of breath, productive sputum and worsening cough. CT chest was performed which showed mucoid impaction and atelectasis. Her sputum culture grew esbl proteus mirabilis and patient was tarted on ertapenem for a 10 day course. Patient was evaluated by Pulmonology. Her chronic steroid dose was increased to 32 mg methylprednisolone. Patient begun on taper by decreasing 4mg every 3 days to return to home dose of 8mg. She was started on 28 mg (11/3-11/6). Patient has follow up appointment scheduled with her pulmonologist Dr. Allison on 11/13 at 5:30 PM    On day of discharge, patient is clinically stable with no new exam findings or acute symptoms compared to prior. The patient was seen by the attending physician on the date of discharge and deemed stable and acceptable for discharge. The patient's chronic medical conditions were treated accordingly per the patient's home medication regimen. The patient's medication reconciliation (with changes made to chronic medications), follow up appointments, discharge orders, instructions, and significant lab and diagnostic studies are as noted.     Discharge follow up action items:     1. Follow up with PCP in 1-2 weeks.   2. Follow up labs, path, & imaging   3. Medication changes Vit D at 25, started on 50,000 Units weekly x8 weeks, Keppra 500mg BID, Steroid taper decreasing 4mg every 3 days to return to home dose of 8mg. She was started on 28 mg (11/3-11/6). ertapenem IV 10 day course   4. On hold medications ***    Patient's ordered code status: Full code    Patient disposition: home     Patient

## 2023-11-03 NOTE — DISCHARGE NOTE PROVIDER - CARE PROVIDERS DIRECT ADDRESSES
[email or web address]

## 2023-11-03 NOTE — DISCHARGE NOTE PROVIDER - NSDCMRMEDTOKEN_GEN_ALL_CORE_FT
Albuterol (Eqv-ProAir HFA) 90 mcg/inh inhalation aerosol: 2 puff(s) inhaled every 4 to 6 hours as needed for  shortness of breath and/or wheezing  ascorbic acid 500 mg oral tablet: 1 tab(s) orally once a day  Basaglar KwikPen 100 units/mL subcutaneous solution: 16 unit(s) subcutaneous once a day (at bedtime) and inject 6 units once a day in the morning  Breo Ellipta 200 mcg-25 mcg/inh inhalation powder: 1 puff(s) inhaled once a day  budesonide 0.5 mg/2 mL inhalation suspension: 2 milliliter(s) by nebulizer 2 times a day  clopidogrel 75 mg oral tablet: 1 tab(s) orally once a day  Crestor 5 mg oral tablet: 1 tab(s) orally once a day (at bedtime)  ertapenem 1 g injection: 1 gram(s) intravenously once a day give ertapnem through Midline till 11/13/2023  famotidine 20 mg oral tablet: 1 tab(s) orally once a day  gabapentin 100 mg oral capsule: 1 cap(s) orally every 8 hours  HumaLOG KwikPen 100 units/mL injectable solution: Inject as per sliding scale  levETIRAcetam 500 mg oral tablet: 1 tab(s) orally 2 times a day  methylPREDNISolone 4 mg oral tablet: 7 tab(s) orally once a day Taper - Decrease by 4mg every 3 days until 8mg  mexiletine 200 mg oral capsule: 1 cap(s) orally every 8 hours  montelukast 10 mg oral tablet: 1 tab(s) orally once a day  Multiple Vitamins oral tablet: 1 tab(s) orally once a day  ondansetron 4 mg oral tablet: 1 tab(s) orally every 6 hours as needed for  nausea  pantoprazole 40 mg oral delayed release tablet: 1 tab(s) orally once a day  scopolamine 1 mg/72 hr transdermal film, extended release: 1 patch transdermally every 3 days  senna leaf extract oral tablet: 2 tab(s) orally once a day (at bedtime)  Warfarin: Take 7.5 mg by mouth once a day   Albuterol (Eqv-ProAir HFA) 90 mcg/inh inhalation aerosol: 2 puff(s) inhaled every 4 to 6 hours as needed for  shortness of breath and/or wheezing  ascorbic acid 500 mg oral tablet: 1 tab(s) orally once a day  Basaglar KwikPen 100 units/mL subcutaneous solution: 16 unit(s) subcutaneous once a day (at bedtime) and inject 6 units once a day in the morning  Breo Ellipta 200 mcg-25 mcg/inh inhalation powder: 1 puff(s) inhaled once a day  budesonide 0.5 mg/2 mL inhalation suspension: 2 milliliter(s) by nebulizer 2 times a day  clopidogrel 75 mg oral tablet: 1 tab(s) orally once a day  Crestor 5 mg oral tablet: 1 tab(s) orally once a day (at bedtime)  ertapenem 1 g injection: 1 gram(s) intravenously once a day give ertapnem through Midline till 11/13/2023  famotidine 20 mg oral tablet: 1 tab(s) orally once a day  gabapentin 100 mg oral capsule: 1 cap(s) orally every 8 hours  HumaLOG KwikPen 100 units/mL injectable solution: injectable Inject as per sliding scale  levETIRAcetam 500 mg oral tablet: 1 tab(s) orally 2 times a day  methylPREDNISolone 4 mg oral tablet: 7 tab(s) orally once a day Taper - Decrease by 4mg every 3 days until 8mg  mexiletine 200 mg oral capsule: 1 cap(s) orally every 8 hours  montelukast 10 mg oral tablet: 1 tab(s) orally once a day  Multiple Vitamins oral tablet: 1 tab(s) orally once a day  ondansetron 4 mg oral tablet: 1 tab(s) orally every 6 hours as needed for  nausea  pantoprazole 40 mg oral delayed release tablet: 1 tab(s) orally once a day  scopolamine 1 mg/72 hr transdermal film, extended release: 1 patch transdermally every 3 days  senna leaf extract oral tablet: 2 tab(s) orally once a day (at bedtime)  Warfarin: Take 7.5 mg by mouth once a day   Albuterol (Eqv-ProAir HFA) 90 mcg/inh inhalation aerosol: 2 puff(s) inhaled every 4 to 6 hours as needed for  shortness of breath and/or wheezing  ascorbic acid 500 mg oral tablet: 1 tab(s) orally once a day  Basaglar KwikPen 100 units/mL subcutaneous solution: 16 unit(s) subcutaneous once a day (at bedtime) and inject 6 units once a day in the morning  Breo Ellipta 200 mcg-25 mcg/inh inhalation powder: 1 puff(s) inhaled once a day  budesonide 0.5 mg/2 mL inhalation suspension: 2 milliliter(s) by nebulizer 2 times a day  clopidogrel 75 mg oral tablet: 1 tab(s) orally once a day  Crestor 5 mg oral tablet: 1 tab(s) orally once a day (at bedtime)  ertapenem 1 g injection: 1 gram(s) intravenously once a day give ertapnem through Midline till 11/13/2023  famotidine 20 mg oral tablet: 1 tab(s) orally once a day  gabapentin 100 mg oral capsule: 1 cap(s) orally every 8 hours  HumaLOG KwikPen 100 units/mL injectable solution: injectable 3 times a day Inject as per sliding scale  levETIRAcetam 500 mg oral tablet: 1 tab(s) orally 2 times a day  methylPREDNISolone 4 mg oral tablet: 7 tab(s) orally once a day Taper - Decrease by 4mg every 3 days until 8mg  mexiletine 200 mg oral capsule: 1 cap(s) orally every 8 hours  montelukast 10 mg oral tablet: 1 tab(s) orally once a day  Multiple Vitamins oral tablet: 1 tab(s) orally once a day  ondansetron 4 mg oral tablet: 1 tab(s) orally every 6 hours as needed for  nausea  pantoprazole 40 mg oral delayed release tablet: 1 tab(s) orally once a day  scopolamine 1 mg/72 hr transdermal film, extended release: 1 patch transdermally every 3 days  senna leaf extract oral tablet: 2 tab(s) orally once a day (at bedtime)  Warfarin: Take 7.5 mg by mouth once a day

## 2023-11-03 NOTE — DISCHARGE NOTE PROVIDER - CARE PROVIDER_API CALL
Eulalio Allison  Pulmonary Disease  [address], [address]  [place] [address]  Phone: [phone]  Fax: [phone]  Established Patient  Follow Up Time: 1 week    [place]  Internal Medicine  [address], [address]  [place] [address]  Phone: [phone]  Fax: [phone]  Established Patient  Follow Up Time: 1 week

## 2023-11-03 NOTE — PROGRESS NOTE ADULT - PROBLEM SELECTOR PLAN 5
CT Chest - Mucoid impaction and atelectasis    - Pulmonary consult, appreciate recs  - Start airway clearance: humidified oxygen, duonebs q6h, 3% nebulized saline q6h, chest PT q8 h, acapella q6 h, chest vest q12h  - titrate oxygen to O2 >88%, use humidified oxygen  - incentive spirometry, OOB to chair, PT/OT  - Sputum culture with ESBL proteus mirabilis   - C/w Ertapenem, PO benadryl 30 minutes prior to administration (10/30- ) Plan for 10 day course  - Appointment made with patient's pulmonologist Dr. Allison on 11/13 at 5:30 PM

## 2023-11-03 NOTE — DISCHARGE NOTE PROVIDER - NSDCFUADDAPPT_GEN_ALL_CORE_FT
APPTS ARE READY TO BE MADE: [x] YES    Best Family or Patient Contact (if needed):    Additional Information about above appointments (if needed):    1: earlier pcp appointment   2:   3:     Other comments or requests:

## 2023-11-03 NOTE — DISCHARGE NOTE PROVIDER - NSDCQMSTROKE_NEU_ALL_CORE
Martha French Francoise Esqueda DO   No

## 2023-11-03 NOTE — EEG REPORT - NS EEG TEXT BOX
RAYRAY RODRIGUEZ [id number]     Study Date: 11-02-23 0800 - 0800	11-03-23  Duration x Hours:  24hr  --------------------------------------------------------------------------------------------------  History:  CC/ HPI Patient is a 73y old  Female who presents with a chief complaint of ams x 2 days (01 Nov 2023 15:30)    MEDICATIONS  (STANDING):  acetylcysteine 10%  Inhalation 2 milliLiter(s) Inhalation every 8 hours  albuterol/ipratropium for Nebulization 3 milliLiter(s) Nebulizer every 6 hours  ascorbic acid 500 milliGRAM(s) Oral daily  atorvastatin 20 milliGRAM(s) Oral at bedtime  budesonide 160 MICROgram(s)/formoterol 4.5 MICROgram(s) Inhaler 2 Puff(s) Inhalation two times a day  clopidogrel Tablet 75 milliGRAM(s) Oral daily  dextrose 5%. 1000 milliLiter(s) (50 mL/Hr) IV Continuous <Continuous>  dextrose 5%. 1000 milliLiter(s) (100 mL/Hr) IV Continuous <Continuous>  dextrose 50% Injectable 12.5 Gram(s) IV Push once  dextrose 50% Injectable 25 Gram(s) IV Push once  ergocalciferol 43941 Unit(s) Oral every week  ertapenem  IVPB      ertapenem  IVPB 1000 milliGRAM(s) IV Intermittent every 24 hours  famotidine    Tablet 20 milliGRAM(s) Oral daily  gabapentin 100 milliGRAM(s) Oral every 8 hours  glucagon  Injectable 1 milliGRAM(s) IntraMuscular once  guaiFENesin  milliGRAM(s) Oral every 12 hours  influenza  Vaccine (HIGH DOSE) 0.7 milliLiter(s) IntraMuscular once  insulin glargine Injectable (LANTUS) 26 Unit(s) SubCutaneous at bedtime  insulin lispro (ADMELOG) corrective regimen sliding scale   SubCutaneous at bedtime  insulin lispro (ADMELOG) corrective regimen sliding scale   SubCutaneous three times a day before meals  insulin lispro Injectable (ADMELOG) 17 Unit(s) SubCutaneous three times a day before meals  lactulose Syrup 20 Gram(s) Oral two times a day  methylPREDNISolone 32 milliGRAM(s) Oral daily  mexiletine 200 milliGRAM(s) Oral every 8 hours  montelukast 10 milliGRAM(s) Oral daily  multivitamin 1 Tablet(s) Oral daily  pantoprazole    Tablet 40 milliGRAM(s) Oral before breakfast  polyethylene glycol 3350 17 Gram(s) Oral daily  senna 2 Tablet(s) Oral at bedtime  sodium chloride 3%  Inhalation 4 milliLiter(s) Inhalation every 6 hours    --------------------------------------------------------------------------------------------------  Study Interpretation:    [[[Abbreviation Key:  PDR=alpha rhythm/posterior dominant rhythm. A-P=anterior posterior.  Amplitude: ‘very low’:<20; ‘low’:20-49; ‘medium’:; ‘high’:>150uV.  Persistence for periodic/rhythmic patterns (% of epoch) ‘rare’:<1%; ‘occasional’:1-10%; ‘frequent’:10-50%; ‘abundant’:50-90%; ‘continuous’:>90%.  Persistence for sporadic discharges: ‘rare’:<1/hr; ‘occasional’:1/min-1/hr; ‘frequent’:>1/min; ‘abundant’:>1/10 sec.  RPP=rhythmic and periodic patterns; GRDA=generalized rhythmic delta activity; FIRDA=frontal intermittent GRDA; LRDA=lateralized rhythmic delta activity; TIRDA=temporal intermittent rhythmic delta activity;  LPD=PLED=lateralized periodic discharges; GPD=generalized periodic discharges; BIPDs =bilateral independent periodic discharges; Mf=multifocal; SIRPDs=stimulus induced rhythmic, periodic, or ictal appearing discharges; BIRDs=brief potentially ictal rhythmic discharges >4 Hz, lasting .5-10s; PFA (paroxysmal bursts >13 Hz or =8 Hz <10s).  Modifiers: +F=with fast component; +S=with spike component; +R=with rhythmic component.  S-B=burst suppression pattern.  Max=maximal. N1-drowsy; N2-stage II sleep; N3-slow wave sleep. SSS/BETS=small sharp spikes/benign epileptiform transients of sleep. HV=hyperventilation; PS=photic stimulation]]]    Daily EEG Visual Analysis    FINDINGS:      Background:  Continuity: continuous  Symmetry: symmetric  PDR: 8.5 Hz, attenuates to eye opening.  Low-amplitude frontal beta in wakefulness.  State change: present  Voltage: normal  Anterior Posterior Gradient: present  Other background findings: none  Breach: absent    Background Slowing:  Generalized slowing: None  Focal slowing: Occasional independent focal polymorphic delta activity in the left > right frontotemporal regions    State Changes:   Drowsiness is characterized by fragmentation, attenuation, and slowing of the background activity.  Stage 2 sleep is characterized by symmetric K complexes and sleep spindles.     Sporadic Epileptiform Discharges:    Rare to Occasional left frontotemporal sharp waves (F7>T7).    Rhythmic and Periodic Patterns (RPPs):  None     Electrographic and Electroclinical seizures:  None    Other Clinical Events:  None    Activation Procedures:   -Hyperventilation was not performed.    -Photic stimulation was not performed.     Artifacts:  Intermittent myogenic and movement artifacts were noted.    Single-lead EKG: Regular rhythm    EEG Classification / Summary:  Abnormal video-EEG in the awake / drowsy / asleep state(s).  - Rare to occasional left  frontotemporal sharp waves  - Independent left > right frontotemporal slowing    Clinical Impression:  - Potentially epileptogenic foci in the left frontotemporal regions  - Independent left > right focal cerebral dysfunction can be structural or functional in etiology.  - No seizures    This is fellow preliminary read, pending attending review.  -------------------------------------------------------------------------------------------------------  [facility] EEG Reading Room Ph#: [phone]  Epilepsy Answering Service after 5PM and before 8:30AM: Ph#: [phone]    OMAR Keller  Epilepsy Fellow   RAYRAY RODRIGUEZ [id number]     Study Date: 11-02-23 0800 - 0800	11-03-23  Duration x Hours:  24hr  --------------------------------------------------------------------------------------------------  History:  CC/ HPI Patient is a 73y old  Female who presents with a chief complaint of ams x 2 days (01 Nov 2023 15:30)    MEDICATIONS  (STANDING):  acetylcysteine 10%  Inhalation 2 milliLiter(s) Inhalation every 8 hours  albuterol/ipratropium for Nebulization 3 milliLiter(s) Nebulizer every 6 hours  ascorbic acid 500 milliGRAM(s) Oral daily  atorvastatin 20 milliGRAM(s) Oral at bedtime  budesonide 160 MICROgram(s)/formoterol 4.5 MICROgram(s) Inhaler 2 Puff(s) Inhalation two times a day  clopidogrel Tablet 75 milliGRAM(s) Oral daily  dextrose 5%. 1000 milliLiter(s) (50 mL/Hr) IV Continuous <Continuous>  dextrose 5%. 1000 milliLiter(s) (100 mL/Hr) IV Continuous <Continuous>  dextrose 50% Injectable 12.5 Gram(s) IV Push once  dextrose 50% Injectable 25 Gram(s) IV Push once  ergocalciferol 51162 Unit(s) Oral every week  ertapenem  IVPB      ertapenem  IVPB 1000 milliGRAM(s) IV Intermittent every 24 hours  famotidine    Tablet 20 milliGRAM(s) Oral daily  gabapentin 100 milliGRAM(s) Oral every 8 hours  glucagon  Injectable 1 milliGRAM(s) IntraMuscular once  guaiFENesin  milliGRAM(s) Oral every 12 hours  influenza  Vaccine (HIGH DOSE) 0.7 milliLiter(s) IntraMuscular once  insulin glargine Injectable (LANTUS) 26 Unit(s) SubCutaneous at bedtime  insulin lispro (ADMELOG) corrective regimen sliding scale   SubCutaneous at bedtime  insulin lispro (ADMELOG) corrective regimen sliding scale   SubCutaneous three times a day before meals  insulin lispro Injectable (ADMELOG) 17 Unit(s) SubCutaneous three times a day before meals  lactulose Syrup 20 Gram(s) Oral two times a day  methylPREDNISolone 32 milliGRAM(s) Oral daily  mexiletine 200 milliGRAM(s) Oral every 8 hours  montelukast 10 milliGRAM(s) Oral daily  multivitamin 1 Tablet(s) Oral daily  pantoprazole    Tablet 40 milliGRAM(s) Oral before breakfast  polyethylene glycol 3350 17 Gram(s) Oral daily  senna 2 Tablet(s) Oral at bedtime  sodium chloride 3%  Inhalation 4 milliLiter(s) Inhalation every 6 hours    --------------------------------------------------------------------------------------------------  Study Interpretation:    [[[Abbreviation Key:  PDR=alpha rhythm/posterior dominant rhythm. A-P=anterior posterior.  Amplitude: ‘very low’:<20; ‘low’:20-49; ‘medium’:; ‘high’:>150uV.  Persistence for periodic/rhythmic patterns (% of epoch) ‘rare’:<1%; ‘occasional’:1-10%; ‘frequent’:10-50%; ‘abundant’:50-90%; ‘continuous’:>90%.  Persistence for sporadic discharges: ‘rare’:<1/hr; ‘occasional’:1/min-1/hr; ‘frequent’:>1/min; ‘abundant’:>1/10 sec.  RPP=rhythmic and periodic patterns; GRDA=generalized rhythmic delta activity; FIRDA=frontal intermittent GRDA; LRDA=lateralized rhythmic delta activity; TIRDA=temporal intermittent rhythmic delta activity;  LPD=PLED=lateralized periodic discharges; GPD=generalized periodic discharges; BIPDs =bilateral independent periodic discharges; Mf=multifocal; SIRPDs=stimulus induced rhythmic, periodic, or ictal appearing discharges; BIRDs=brief potentially ictal rhythmic discharges >4 Hz, lasting .5-10s; PFA (paroxysmal bursts >13 Hz or =8 Hz <10s).  Modifiers: +F=with fast component; +S=with spike component; +R=with rhythmic component.  S-B=burst suppression pattern.  Max=maximal. N1-drowsy; N2-stage II sleep; N3-slow wave sleep. SSS/BETS=small sharp spikes/benign epileptiform transients of sleep. HV=hyperventilation; PS=photic stimulation]]]    Daily EEG Visual Analysis    FINDINGS:      Background:  Continuity: continuous  Symmetry: symmetric  PDR: 8.5 Hz, attenuates to eye opening.  Low-amplitude frontal beta in wakefulness.  State change: present  Voltage: normal  Anterior Posterior Gradient: present  Other background findings: none  Breach: absent    Background Slowing:  Generalized slowing: None  Focal slowing: Occasional independent focal polymorphic delta activity in the left > right frontotemporal regions    State Changes:   Drowsiness is characterized by fragmentation, attenuation, and slowing of the background activity.  Stage 2 sleep is characterized by symmetric K complexes and sleep spindles.     Sporadic Epileptiform Discharges:    Rare left frontotemporal sharp waves (F7>T7).    Rhythmic and Periodic Patterns (RPPs):  None     Electrographic and Electroclinical seizures:  None    Other Clinical Events:  None    Activation Procedures:   -Hyperventilation was not performed.    -Photic stimulation was not performed.     Artifacts:  Intermittent myogenic and movement artifacts were noted.    Single-lead EKG: Regular rhythm    EEG Classification / Summary:  Abnormal video-EEG in the awake / drowsy / asleep state(s).  - Rare left  frontotemporal sharp waves  - Independent left > right frontotemporal slowing    Clinical Impression:  - Potentially epileptogenic foci in the left frontotemporal regions  - Independent left > right focal cerebral dysfunction can be structural or functional in etiology.  - No seizures    -------------------------------------------------------------------------------------------------------  [facility] EEG Reading Room Ph#: [phone]  Epilepsy Answering Service after 5PM and before 8:30AM: Ph#: [phone]    OMAR Keller  Epilepsy Fellow    Alberto Meyer MD  EEG/Epilepsy Attending    RAYRAY RODRIGUEZ [id number]     Study Date: 11-02-23 	0800 - 1400	11-03-23  Duration x Hours:  30hr  --------------------------------------------------------------------------------------------------  History:  CC/ HPI Patient is a 73y old  Female who presents with a chief complaint of ams x 2 days (01 Nov 2023 15:30)    MEDICATIONS  (STANDING):  acetylcysteine 10%  Inhalation 2 milliLiter(s) Inhalation every 8 hours  albuterol/ipratropium for Nebulization 3 milliLiter(s) Nebulizer every 6 hours  ascorbic acid 500 milliGRAM(s) Oral daily  atorvastatin 20 milliGRAM(s) Oral at bedtime  budesonide 160 MICROgram(s)/formoterol 4.5 MICROgram(s) Inhaler 2 Puff(s) Inhalation two times a day  clopidogrel Tablet 75 milliGRAM(s) Oral daily  dextrose 5%. 1000 milliLiter(s) (50 mL/Hr) IV Continuous <Continuous>  dextrose 5%. 1000 milliLiter(s) (100 mL/Hr) IV Continuous <Continuous>  dextrose 50% Injectable 12.5 Gram(s) IV Push once  dextrose 50% Injectable 25 Gram(s) IV Push once  ergocalciferol 34260 Unit(s) Oral every week  ertapenem  IVPB      ertapenem  IVPB 1000 milliGRAM(s) IV Intermittent every 24 hours  famotidine    Tablet 20 milliGRAM(s) Oral daily  gabapentin 100 milliGRAM(s) Oral every 8 hours  glucagon  Injectable 1 milliGRAM(s) IntraMuscular once  guaiFENesin  milliGRAM(s) Oral every 12 hours  influenza  Vaccine (HIGH DOSE) 0.7 milliLiter(s) IntraMuscular once  insulin glargine Injectable (LANTUS) 26 Unit(s) SubCutaneous at bedtime  insulin lispro (ADMELOG) corrective regimen sliding scale   SubCutaneous at bedtime  insulin lispro (ADMELOG) corrective regimen sliding scale   SubCutaneous three times a day before meals  insulin lispro Injectable (ADMELOG) 17 Unit(s) SubCutaneous three times a day before meals  lactulose Syrup 20 Gram(s) Oral two times a day  methylPREDNISolone 32 milliGRAM(s) Oral daily  mexiletine 200 milliGRAM(s) Oral every 8 hours  montelukast 10 milliGRAM(s) Oral daily  multivitamin 1 Tablet(s) Oral daily  pantoprazole    Tablet 40 milliGRAM(s) Oral before breakfast  polyethylene glycol 3350 17 Gram(s) Oral daily  senna 2 Tablet(s) Oral at bedtime  sodium chloride 3%  Inhalation 4 milliLiter(s) Inhalation every 6 hours    --------------------------------------------------------------------------------------------------  Study Interpretation:    [[[Abbreviation Key:  PDR=alpha rhythm/posterior dominant rhythm. A-P=anterior posterior.  Amplitude: ‘very low’:<20; ‘low’:20-49; ‘medium’:; ‘high’:>150uV.  Persistence for periodic/rhythmic patterns (% of epoch) ‘rare’:<1%; ‘occasional’:1-10%; ‘frequent’:10-50%; ‘abundant’:50-90%; ‘continuous’:>90%.  Persistence for sporadic discharges: ‘rare’:<1/hr; ‘occasional’:1/min-1/hr; ‘frequent’:>1/min; ‘abundant’:>1/10 sec.  RPP=rhythmic and periodic patterns; GRDA=generalized rhythmic delta activity; FIRDA=frontal intermittent GRDA; LRDA=lateralized rhythmic delta activity; TIRDA=temporal intermittent rhythmic delta activity;  LPD=PLED=lateralized periodic discharges; GPD=generalized periodic discharges; BIPDs =bilateral independent periodic discharges; Mf=multifocal; SIRPDs=stimulus induced rhythmic, periodic, or ictal appearing discharges; BIRDs=brief potentially ictal rhythmic discharges >4 Hz, lasting .5-10s; PFA (paroxysmal bursts >13 Hz or =8 Hz <10s).  Modifiers: +F=with fast component; +S=with spike component; +R=with rhythmic component.  S-B=burst suppression pattern.  Max=maximal. N1-drowsy; N2-stage II sleep; N3-slow wave sleep. SSS/BETS=small sharp spikes/benign epileptiform transients of sleep. HV=hyperventilation; PS=photic stimulation]]]    Daily EEG Visual Analysis    FINDINGS:      Background:  Continuity: continuous  Symmetry: symmetric  PDR: 8.5 Hz, attenuates to eye opening.  Low-amplitude frontal beta in wakefulness.  State change: present  Voltage: normal  Anterior Posterior Gradient: present  Other background findings: none  Breach: absent    Background Slowing:  Generalized slowing: None  Focal slowing: Occasional independent focal polymorphic delta activity in the left > right frontotemporal regions    State Changes:   Drowsiness is characterized by fragmentation, attenuation, and slowing of the background activity.  Stage 2 sleep is characterized by symmetric K complexes and sleep spindles.     Sporadic Epileptiform Discharges:    Rare left frontotemporal sharp waves (F7>T7).    Rhythmic and Periodic Patterns (RPPs):  None     Electrographic and Electroclinical seizures:  None    Other Clinical Events:  None    Activation Procedures:   -Hyperventilation was not performed.    -Photic stimulation was not performed.     Artifacts:  Intermittent myogenic and movement artifacts were noted.    Single-lead EKG: Regular rhythm    EEG Classification / Summary:  Abnormal video-EEG in the awake / drowsy / asleep state(s).  - Rare left  frontotemporal sharp waves  - Independent left > right frontotemporal slowing    Clinical Impression:  - Potentially epileptogenic foci in the left frontotemporal regions  - Independent left > right focal cerebral dysfunction can be structural or functional in etiology.  - No seizures    -------------------------------------------------------------------------------------------------------  [facility] EEG Reading Room Ph#: [phone]  Epilepsy Answering Service after 5PM and before 8:30AM: Ph#: [phone]    OMAR Keller  Epilepsy Fellow    Alberto Meyer MD  EEG/Epilepsy Attending

## 2023-11-03 NOTE — DISCHARGE NOTE PROVIDER - NSDCCPTREATMENT_GEN_ALL_CORE_FT
PRINCIPAL PROCEDURE  Procedure: MRI head  Findings and Treatment: IMPRESSION:  Negative for recent infarct.  White matter microangiopathic change is mildly increased comparing to   2021 and there are scattered nonspecific microhemorrhages.        SECONDARY PROCEDURE  Procedure: CT chest wo con  Findings and Treatment: FINDINGS:  AIRWAYS AND LUNGS: Tracheobronchial secretions.  Scattered clustered and   linear opacities predominantly in the lower lobes.  MEDIASTINUM AND PLEURA: There are no enlarged mediastinal, hilar or   axillary lymph nodes. The visualized portion of the thyroid gland is   unremarkable. There is no pleural effusion. There is no pneumothorax.  HEART AND VESSELS: There is cardiomegaly.  There are atherosclerotic   calcifications of the aorta and coronary arteries.  There is no   pericardial effusion.  Sternotomy, aortic repair, and TAVR.  UPPER ABDOMEN: Images of the upper abdomen demonstrate pancreatic tail   cyst better evaluated on prior CT abdomen pelvis.  BONES AND SOFT TISSUES: There are mild degenerative changes of the spine.    The soft tissues are unremarkable.  IMPRESSION:  Mucoid impaction and atelectasis.

## 2023-11-03 NOTE — DISCHARGE NOTE PROVIDER - NSDCCPCAREPLAN_GEN_ALL_CORE_FT
PRINCIPAL DISCHARGE DIAGNOSIS  Diagnosis: Altered mental status  Assessment and Plan of Treatment: You were evaluated in the hospital for your word finding difficutly. We performed a CT head that did not show any acute abnormalities. An MRI of your head was also performed which showed no recent infarct with scattered nonspecific microhemorrhages. Please follow up with neurology regarding your MRI results. You were also evaluated for seizures. You did not have any seizures while on the eeg device but it did show a possible area of abnormal electrical activity in your brain. You were started on Keppra 500mg twice daily, please take this medication as prescribed.      SECONDARY DISCHARGE DIAGNOSES  Diagnosis: Pneumonia  Assessment and Plan of Treatment: You were evaluated for your shortness of breath and productive cough. Your sputum culture grew a very resistant bacteria called esbl proteus mirabilis. You were started on an antibiotic in the hospital that you will complete at home. Please follow up with your pulmonologist on 11/13 for your scheduled appointment.     PRINCIPAL DISCHARGE DIAGNOSIS  Diagnosis: Altered mental status  Assessment and Plan of Treatment: You were evaluated in the hospital for your word finding difficutly. We performed a CT head that did not show any acute abnormalities. An MRI of your head was also performed which showed no recent infarct with scattered nonspecific microhemorrhages. Please follow up with neurology regarding your MRI results. You were also evaluated for seizures. You did not have any seizures while on the eeg device but it did show a possible area of abnormal electrical activity in your brain. You were started on Keppra 500mg twice daily, please take this medication as prescribed.      SECONDARY DISCHARGE DIAGNOSES  Diagnosis: Pneumonia  Assessment and Plan of Treatment: You were evaluated for your shortness of breath and productive cough. Your sputum culture grew a very resistant bacteria called esbl proteus mirabilis. You were started on an antibiotic in the hospital that you will complete at home. Please follow up with your pulmonologist on 11/13 for your scheduled appointment.    Diagnosis: Transaminitis  Assessment and Plan of Treatment: Your Liver Enzyme level (AST) was slightly elevated on the day of your discharge. Please follow up with your Primary Care Provider in 1 week and get a repeat CMP Blood test to reassess your liver function.

## 2023-11-03 NOTE — DISCHARGE NOTE PROVIDER - NSDCFUSCHEDAPPT_GEN_ALL_CORE_FT
Eulalio Allison  [facility]  [address]ULMMED [address]  Scheduled Appointment: 11/13/2023    [place]  [facility]  [address]   Scheduled Appointment: 11/29/2023    [facility]  PULMMED [address]  Scheduled Appointment: 12/12/2023    Eulalio Allison  [facility]  PULMMED [address]  Scheduled Appointment: 12/12/2023    Eulalio Allison  [facility]  PULMMED [address]  Scheduled Appointment: 01/10/2024    Rico Glover  [facility]  CARDIOLOGY [address]  Scheduled Appointment: 01/17/2024    Rico Glover  [facility]  CARDIOLOGY [address]  Scheduled Appointment: 02/01/2024    Genesis Aragon  [facility]  ELECTROPH [address]  Scheduled Appointment: 02/01/2024

## 2023-11-03 NOTE — PROGRESS NOTE ADULT - PROBLEM SELECTOR PLAN 1
unsteady gait w/ episodic confusion, now at baseline mental state, no acute findings on CT Head ,no focal deficits on exam, mild leukocytosis but no other evidence for acute infection  - Neuro-checks   - MR brain ordered - no recent infarct, white matter microangiopathic change mildly increased from 2021, scattered nonspecific microhemorrhage.   - Neurology consult   - B12, folate, RPR wnl  - Vit D at 25, started on 50,000 Units weekly x8 weeks  - fall precautions   - Patient scheduled for MRI Brain w/IV contrast today (11/3) at 7pm  - EEG with L frontal temporal sharp waves, no seizures. Patient started on Keppra 500mg BID

## 2023-11-03 NOTE — DISCHARGE NOTE PROVIDER - ATTENDING DISCHARGE PHYSICAL EXAMINATION:
GENERAL: NAD, well-developed  HEAD:  Atraumatic, Normocephalic, white plaques on tongue  CHEST/LUNG: normal work of breathing, clear to auscultation  HEART: Regular rate and rhythm  ABDOMEN: Soft, Nontender, Nondistended; colostomy bag in place with soft brown stool  EXTREMITIES:  2+ Peripheral Pulses, No edema  NEUROLOGY: AAOx3, no focal deficits   SKIN: No rashes or lesions

## 2023-11-03 NOTE — PROGRESS NOTE ADULT - SUBJECTIVE AND OBJECTIVE BOX
DATE OF SERVICE: 11-03-23 @ 12:56    Patient is a 73y old  Female who presents with a chief complaint of ams x 2 days (02 Nov 2023 12:35)      SUBJECTIVE / OVERNIGHT EVENTS: No acute events overnight, patient seen and examined at bedside. Patient reports developing oral thrush yesterday evening and overnight. Patient states she has had oral thrush many times. She also endorses associated throat pain. Will give patient nystatin oral solution. Patient otherwise reports subjective improvement each day while being on antibiotics. She denies experiencing any side effects from starting Keppra yesterday.     MEDICATIONS  (STANDING):  acetylcysteine 10%  Inhalation 2 milliLiter(s) Inhalation every 8 hours  acetylcysteine 10%  Inhalation 4 milliLiter(s) Inhalation two times a day  albuterol/ipratropium for Nebulization 3 milliLiter(s) Nebulizer every 6 hours  ascorbic acid 500 milliGRAM(s) Oral daily  atorvastatin 20 milliGRAM(s) Oral at bedtime  budesonide 160 MICROgram(s)/formoterol 4.5 MICROgram(s) Inhaler 2 Puff(s) Inhalation two times a day  clopidogrel Tablet 75 milliGRAM(s) Oral daily  dextrose 5%. 1000 milliLiter(s) (50 mL/Hr) IV Continuous <Continuous>  dextrose 5%. 1000 milliLiter(s) (100 mL/Hr) IV Continuous <Continuous>  dextrose 50% Injectable 12.5 Gram(s) IV Push once  dextrose 50% Injectable 25 Gram(s) IV Push once  ergocalciferol 29206 Unit(s) Oral every week  ertapenem  IVPB      ertapenem  IVPB 1000 milliGRAM(s) IV Intermittent every 24 hours  famotidine    Tablet 20 milliGRAM(s) Oral daily  gabapentin 100 milliGRAM(s) Oral every 8 hours  glucagon  Injectable 1 milliGRAM(s) IntraMuscular once  guaiFENesin  milliGRAM(s) Oral every 12 hours  influenza  Vaccine (HIGH DOSE) 0.7 milliLiter(s) IntraMuscular once  insulin glargine Injectable (LANTUS) 26 Unit(s) SubCutaneous at bedtime  insulin lispro (ADMELOG) corrective regimen sliding scale   SubCutaneous three times a day before meals  insulin lispro (ADMELOG) corrective regimen sliding scale   SubCutaneous at bedtime  insulin lispro Injectable (ADMELOG) 17 Unit(s) SubCutaneous three times a day before meals  lactulose Syrup 20 Gram(s) Oral two times a day  levETIRAcetam 500 milliGRAM(s) Oral two times a day  methylPREDNISolone 28 milliGRAM(s) Oral daily  mexiletine 200 milliGRAM(s) Oral every 8 hours  montelukast 10 milliGRAM(s) Oral daily  multivitamin 1 Tablet(s) Oral daily  nystatin    Suspension 700696 Unit(s) Oral two times a day  pantoprazole    Tablet 40 milliGRAM(s) Oral before breakfast  polyethylene glycol 3350 17 Gram(s) Oral daily  senna 2 Tablet(s) Oral at bedtime  sodium chloride 3%  Inhalation 4 milliLiter(s) Inhalation every 6 hours  warfarin 3.5 milliGRAM(s) Oral at bedtime    MEDICATIONS  (PRN):  acetaminophen     Tablet .. 650 milliGRAM(s) Oral every 6 hours PRN Temp greater or equal to 38C (100.4F), Mild Pain (1 - 3)  dextrose Oral Gel 15 Gram(s) Oral once PRN Blood Glucose LESS THAN 70 milliGRAM(s)/deciliter  diphenhydrAMINE 50 milliGRAM(s) Oral daily PRN Allergy symptoms  melatonin 3 milliGRAM(s) Oral at bedtime PRN Insomnia  ondansetron Injectable 4 milliGRAM(s) IV Push every 8 hours PRN Nausea and/or Vomiting      Vital Signs Last 24 Hrs  T(C): 36.2 (03 Nov 2023 11:58), Max: 36.8 (03 Nov 2023 04:37)  T(F): 97.1 (03 Nov 2023 11:58), Max: 98.2 (03 Nov 2023 04:37)  HR: 73 (03 Nov 2023 11:58) (71 - 86)  BP: 139/63 (03 Nov 2023 11:58) (107/70 - 139/63)  BP(mean): --  RR: 18 (03 Nov 2023 11:58) (18 - 18)  SpO2: 95% (03 Nov 2023 11:58) (95% - 100%)    Parameters below as of 03 Nov 2023 11:58  Patient On (Oxygen Delivery Method): room air      CAPILLARY BLOOD GLUCOSE      POCT Blood Glucose.: 358 mg/dL (03 Nov 2023 12:18)  POCT Blood Glucose.: 219 mg/dL (03 Nov 2023 08:23)  POCT Blood Glucose.: 164 mg/dL (02 Nov 2023 21:56)  POCT Blood Glucose.: 127 mg/dL (02 Nov 2023 17:50)    I&O's Summary    02 Nov 2023 07:01  -  03 Nov 2023 07:00  --------------------------------------------------------  IN: 220 mL / OUT: 400 mL / NET: -180 mL        PHYSICAL EXAM:  GENERAL: NAD, well-developed  HEAD:  Atraumatic, Normocephalic, white plaques on tongue  EYES: PERRLA  NECK: Supple, No JVD  CHEST/LUNG: Expiratory and inspiratory wheeze, productive cough, course breath sounds, improving  HEART: Regular rate and rhythm  ABDOMEN: Soft, Nontender, Nondistended; colostomy bag in place  EXTREMITIES:  2+ Peripheral Pulses, No edema  NEUROLOGY: non-focal  SKIN: No rashes or lesions    LABS:                        9.9    13.03 )-----------( 380      ( 03 Nov 2023 10:26 )             35.0     11-03    139  |  101  |  20  ----------------------------<  327<H>  4.3   |  23  |  0.61    Ca    9.0      03 Nov 2023 10:26  Phos  3.0     11-03  Mg     2.1     11-03    TPro  6.2  /  Alb  3.6  /  TBili  <0.1<L>  /  DBili  x   /  AST  27  /  ALT  24  /  AlkPhos  81  11-03    PT/INR - ( 03 Nov 2023 10:26 )   PT: 15.8 sec;   INR: 1.45 ratio               Urinalysis Basic - ( 03 Nov 2023 10:26 )    Color: x / Appearance: x / SG: x / pH: x  Gluc: 327 mg/dL / Ketone: x  / Bili: x / Urobili: x   Blood: x / Protein: x / Nitrite: x   Leuk Esterase: x / RBC: x / WBC x   Sq Epi: x / Non Sq Epi: x / Bacteria: x        RADIOLOGY & ADDITIONAL TESTS:    Imaging Personally Reviewed:    Consultant(s) Notes Reviewed:      Care Discussed with Consultants/Other Providers:

## 2023-11-04 LAB
GLUCOSE BLDC GLUCOMTR-MCNC: 244 MG/DL — HIGH (ref 70–99)
GLUCOSE BLDC GLUCOMTR-MCNC: 244 MG/DL — HIGH (ref 70–99)
GLUCOSE BLDC GLUCOMTR-MCNC: 323 MG/DL — HIGH (ref 70–99)
GLUCOSE BLDC GLUCOMTR-MCNC: 323 MG/DL — HIGH (ref 70–99)
GLUCOSE BLDC GLUCOMTR-MCNC: 375 MG/DL — HIGH (ref 70–99)
GLUCOSE BLDC GLUCOMTR-MCNC: 375 MG/DL — HIGH (ref 70–99)
GLUCOSE BLDC GLUCOMTR-MCNC: 97 MG/DL — SIGNIFICANT CHANGE UP (ref 70–99)
GLUCOSE BLDC GLUCOMTR-MCNC: 97 MG/DL — SIGNIFICANT CHANGE UP (ref 70–99)
INR BLD: 1.66 RATIO — HIGH (ref 0.85–1.18)
INR BLD: 1.66 RATIO — HIGH (ref 0.85–1.18)
PROTHROM AB SERPL-ACNC: 17.2 SEC — HIGH (ref 9.5–13)
PROTHROM AB SERPL-ACNC: 17.2 SEC — HIGH (ref 9.5–13)

## 2023-11-04 PROCEDURE — 70553 MRI BRAIN STEM W/O & W/DYE: CPT | Mod: 26

## 2023-11-04 PROCEDURE — 99232 SBSQ HOSP IP/OBS MODERATE 35: CPT | Mod: GC

## 2023-11-04 RX ORDER — DEXTROSE 50 % IN WATER 50 %
15 SYRINGE (ML) INTRAVENOUS ONCE
Refills: 0 | Status: COMPLETED | OUTPATIENT
Start: 2023-11-04 | End: 2023-11-04

## 2023-11-04 RX ORDER — INSULIN LISPRO 100/ML
19 VIAL (ML) SUBCUTANEOUS ONCE
Refills: 0 | Status: COMPLETED | OUTPATIENT
Start: 2023-11-04 | End: 2023-11-04

## 2023-11-04 RX ORDER — INSULIN LISPRO 100/ML
19 VIAL (ML) SUBCUTANEOUS
Refills: 0 | Status: DISCONTINUED | OUTPATIENT
Start: 2023-11-04 | End: 2023-11-05

## 2023-11-04 RX ORDER — WARFARIN SODIUM 2.5 MG/1
7.5 TABLET ORAL ONCE
Refills: 0 | Status: COMPLETED | OUTPATIENT
Start: 2023-11-04 | End: 2023-11-04

## 2023-11-04 RX ADMIN — Medication 28 MILLIGRAM(S): at 06:33

## 2023-11-04 RX ADMIN — GABAPENTIN 100 MILLIGRAM(S): 400 CAPSULE ORAL at 06:32

## 2023-11-04 RX ADMIN — Medication 1 TABLET(S): at 13:04

## 2023-11-04 RX ADMIN — POLYETHYLENE GLYCOL 3350 17 GRAM(S): 17 POWDER, FOR SOLUTION ORAL at 14:16

## 2023-11-04 RX ADMIN — Medication 2 MILLILITER(S): at 13:04

## 2023-11-04 RX ADMIN — Medication 3 MILLILITER(S): at 13:03

## 2023-11-04 RX ADMIN — Medication 4 MILLILITER(S): at 06:32

## 2023-11-04 RX ADMIN — SODIUM CHLORIDE 4 MILLILITER(S): 9 INJECTION INTRAMUSCULAR; INTRAVENOUS; SUBCUTANEOUS at 17:07

## 2023-11-04 RX ADMIN — Medication 50 MILLIGRAM(S): at 09:04

## 2023-11-04 RX ADMIN — LACTULOSE 20 GRAM(S): 10 SOLUTION ORAL at 06:41

## 2023-11-04 RX ADMIN — LEVETIRACETAM 500 MILLIGRAM(S): 250 TABLET, FILM COATED ORAL at 17:07

## 2023-11-04 RX ADMIN — MEXILETINE HYDROCHLORIDE 200 MILLIGRAM(S): 150 CAPSULE ORAL at 06:31

## 2023-11-04 RX ADMIN — ERTAPENEM SODIUM 120 MILLIGRAM(S): 1 INJECTION, POWDER, LYOPHILIZED, FOR SOLUTION INTRAMUSCULAR; INTRAVENOUS at 10:05

## 2023-11-04 RX ADMIN — CLOPIDOGREL BISULFATE 75 MILLIGRAM(S): 75 TABLET, FILM COATED ORAL at 13:03

## 2023-11-04 RX ADMIN — INSULIN GLARGINE 26 UNIT(S): 100 INJECTION, SOLUTION SUBCUTANEOUS at 22:39

## 2023-11-04 RX ADMIN — Medication 2 MILLILITER(S): at 06:29

## 2023-11-04 RX ADMIN — Medication 2 MILLILITER(S): at 22:41

## 2023-11-04 RX ADMIN — Medication 4 MILLILITER(S): at 17:06

## 2023-11-04 RX ADMIN — Medication 15 GRAM(S): at 00:50

## 2023-11-04 RX ADMIN — LEVETIRACETAM 500 MILLIGRAM(S): 250 TABLET, FILM COATED ORAL at 06:30

## 2023-11-04 RX ADMIN — Medication 19 UNIT(S): at 13:26

## 2023-11-04 RX ADMIN — SODIUM CHLORIDE 4 MILLILITER(S): 9 INJECTION INTRAMUSCULAR; INTRAVENOUS; SUBCUTANEOUS at 06:30

## 2023-11-04 RX ADMIN — SENNA PLUS 2 TABLET(S): 8.6 TABLET ORAL at 22:41

## 2023-11-04 RX ADMIN — Medication 500000 UNIT(S): at 17:08

## 2023-11-04 RX ADMIN — FAMOTIDINE 20 MILLIGRAM(S): 10 INJECTION INTRAVENOUS at 13:05

## 2023-11-04 RX ADMIN — SODIUM CHLORIDE 4 MILLILITER(S): 9 INJECTION INTRAMUSCULAR; INTRAVENOUS; SUBCUTANEOUS at 13:03

## 2023-11-04 RX ADMIN — Medication 2: at 17:43

## 2023-11-04 RX ADMIN — BUDESONIDE AND FORMOTEROL FUMARATE DIHYDRATE 2 PUFF(S): 160; 4.5 AEROSOL RESPIRATORY (INHALATION) at 17:08

## 2023-11-04 RX ADMIN — MEXILETINE HYDROCHLORIDE 200 MILLIGRAM(S): 150 CAPSULE ORAL at 15:49

## 2023-11-04 RX ADMIN — PANTOPRAZOLE SODIUM 40 MILLIGRAM(S): 20 TABLET, DELAYED RELEASE ORAL at 06:31

## 2023-11-04 RX ADMIN — SODIUM CHLORIDE 4 MILLILITER(S): 9 INJECTION INTRAMUSCULAR; INTRAVENOUS; SUBCUTANEOUS at 23:38

## 2023-11-04 RX ADMIN — Medication 4: at 09:05

## 2023-11-04 RX ADMIN — Medication 3 MILLILITER(S): at 06:30

## 2023-11-04 RX ADMIN — Medication 600 MILLIGRAM(S): at 17:07

## 2023-11-04 RX ADMIN — Medication 500 MILLIGRAM(S): at 13:04

## 2023-11-04 RX ADMIN — Medication 3 MILLILITER(S): at 17:06

## 2023-11-04 RX ADMIN — MONTELUKAST 10 MILLIGRAM(S): 4 TABLET, CHEWABLE ORAL at 13:03

## 2023-11-04 RX ADMIN — Medication 3 MILLILITER(S): at 23:39

## 2023-11-04 RX ADMIN — GABAPENTIN 100 MILLIGRAM(S): 400 CAPSULE ORAL at 22:39

## 2023-11-04 RX ADMIN — ATORVASTATIN CALCIUM 20 MILLIGRAM(S): 80 TABLET, FILM COATED ORAL at 22:40

## 2023-11-04 RX ADMIN — Medication 500000 UNIT(S): at 06:33

## 2023-11-04 RX ADMIN — WARFARIN SODIUM 7.5 MILLIGRAM(S): 2.5 TABLET ORAL at 22:39

## 2023-11-04 RX ADMIN — Medication 5: at 13:02

## 2023-11-04 RX ADMIN — Medication 19 UNIT(S): at 17:43

## 2023-11-04 RX ADMIN — Medication 600 MILLIGRAM(S): at 06:31

## 2023-11-04 RX ADMIN — GABAPENTIN 100 MILLIGRAM(S): 400 CAPSULE ORAL at 14:15

## 2023-11-04 RX ADMIN — BUDESONIDE AND FORMOTEROL FUMARATE DIHYDRATE 2 PUFF(S): 160; 4.5 AEROSOL RESPIRATORY (INHALATION) at 06:30

## 2023-11-04 RX ADMIN — MEXILETINE HYDROCHLORIDE 200 MILLIGRAM(S): 150 CAPSULE ORAL at 22:40

## 2023-11-04 NOTE — PROGRESS NOTE ADULT - ASSESSMENT
72F w/ asthma on chronic steroids, bronchiectasis,  recurrent PNA,  tracheomalacia s/p tracheoplasty, DM  paroxysmal A-fib on coumadin, colorectal cancer s/p colectomy and ostomy ,  s/p AVR (2022) and  TAVR- [facility] on 9/6 , recent shingles , as well as covid infection;  p/w AMS x few days              72F w/ asthma on chronic steroids, bronchiectasis,  recurrent PNA,  tracheomalacia s/p tracheoplasty, DM  paroxysmal A-fib on coumadin, colorectal cancer s/p colectomy and ostomy ,  s/p AVR (2022) and  TAVR- [facility] on 9/6 , recent shingles , as well as covid infection;  p/w AMS x few days, pending MRI.

## 2023-11-04 NOTE — PROGRESS NOTE ADULT - PROBLEM SELECTOR PLAN 5
CT Chest - Mucoid impaction and atelectasis    - Pulmonary consult, appreciate recs  - Start airway clearance: humidified oxygen, duonebs q6h, 3% nebulized saline q6h, chest PT q8 h, acapella q6 h, chest vest q12h  - titrate oxygen to O2 >88%, use humidified oxygen  - incentive spirometry, OOB to chair, PT/OT  - Sputum culture with ESBL proteus mirabilis   - C/w Ertapenem, PO benadryl 30 minutes prior to administration (10/30- ) Plan for 10 day course  - Appointment made with patient's pulmonologist Dr. Allison on 11/13 at 5:30 PM CT Chest - Mucoid impaction and atelectasis  - Pulmonary consult, appreciate recs  - Start airway clearance: humidified oxygen, duonebs q6h, 3% nebulized saline q6h, chest PT q8 h, acapella q6 h, chest vest q12h  - titrate oxygen to O2 >88%, use humidified oxygen  - incentive spirometry, OOB to chair, PT/OT  - Sputum culture with ESBL proteus mirabilis   - C/w Ertapenem, PO benadryl 30 minutes prior to administration (10/30- ) Plan for 10 day course through 11/13  - Appointment made with patient's pulmonologist Dr. Allison on 11/13 at 5:30 PM

## 2023-11-04 NOTE — PROGRESS NOTE ADULT - PROBLEM SELECTOR PLAN 3
Taking 7.5mg at home  - monitor daily INR, coumadin dosing based on that  - Continue with Plavix 75 mg PO daily   - Continue with Mexiletine 200 mg every 8 hours   - Continue with Atorvastatin 20 mg PO HS Taking 7.5mg at home  - monitor daily INR, coumadin dosing based on INR  - Continue with Plavix 75 mg PO daily   - Continue with Mexiletine 200 mg every 8 hours   - Continue with Atorvastatin 20 mg PO HS

## 2023-11-04 NOTE — PROGRESS NOTE ADULT - SUBJECTIVE AND OBJECTIVE BOX
*******************************  Sylvia Osman MD (PGY-2)  Internal Medicine  Contact via [employer] TEAMS  [facility] Pager: [phone]  [facility] Pager: [phone]  *******************************    PROGRESS NOTE:     Patient is a 73y old  Female who presents with a chief complaint of ams x 2 days (03 Nov 2023 15:47)    INTERVAL EVENTS: hypoglycemic overnight to 60s    SUBJECTIVE: Patient seen and examined at bedside. This morning, the patient is comfortable and doing well. No acute complaints. Denies fevers, chills, N/V/D, chest pain, SOB, abdominal pain.    MEDICATIONS  (STANDING):  acetylcysteine 10%  Inhalation 2 milliLiter(s) Inhalation every 8 hours  acetylcysteine 10%  Inhalation 4 milliLiter(s) Inhalation two times a day  albuterol/ipratropium for Nebulization 3 milliLiter(s) Nebulizer every 6 hours  ascorbic acid 500 milliGRAM(s) Oral daily  atorvastatin 20 milliGRAM(s) Oral at bedtime  budesonide 160 MICROgram(s)/formoterol 4.5 MICROgram(s) Inhaler 2 Puff(s) Inhalation two times a day  clopidogrel Tablet 75 milliGRAM(s) Oral daily  dextrose 5%. 1000 milliLiter(s) (50 mL/Hr) IV Continuous <Continuous>  dextrose 5%. 1000 milliLiter(s) (100 mL/Hr) IV Continuous <Continuous>  dextrose 50% Injectable 25 Gram(s) IV Push once  dextrose 50% Injectable 12.5 Gram(s) IV Push once  ergocalciferol 68907 Unit(s) Oral every week  ertapenem  IVPB      ertapenem  IVPB 1000 milliGRAM(s) IV Intermittent every 24 hours  famotidine    Tablet 20 milliGRAM(s) Oral daily  gabapentin 100 milliGRAM(s) Oral every 8 hours  glucagon  Injectable 1 milliGRAM(s) IntraMuscular once  guaiFENesin  milliGRAM(s) Oral every 12 hours  influenza  Vaccine (HIGH DOSE) 0.7 milliLiter(s) IntraMuscular once  insulin glargine Injectable (LANTUS) 26 Unit(s) SubCutaneous at bedtime  insulin lispro (ADMELOG) corrective regimen sliding scale   SubCutaneous three times a day before meals  insulin lispro (ADMELOG) corrective regimen sliding scale   SubCutaneous at bedtime  insulin lispro Injectable (ADMELOG) 17 Unit(s) SubCutaneous three times a day before meals  levETIRAcetam 500 milliGRAM(s) Oral two times a day  methylPREDNISolone 28 milliGRAM(s) Oral daily  mexiletine 200 milliGRAM(s) Oral every 8 hours  montelukast 10 milliGRAM(s) Oral daily  multivitamin 1 Tablet(s) Oral daily  nystatin    Suspension 978177 Unit(s) Oral two times a day  pantoprazole    Tablet 40 milliGRAM(s) Oral before breakfast  polyethylene glycol 3350 17 Gram(s) Oral daily  senna 2 Tablet(s) Oral at bedtime  sodium chloride 3%  Inhalation 4 milliLiter(s) Inhalation every 6 hours    MEDICATIONS  (PRN):  acetaminophen     Tablet .. 650 milliGRAM(s) Oral every 6 hours PRN Temp greater or equal to 38C (100.4F), Mild Pain (1 - 3)  dextrose Oral Gel 15 Gram(s) Oral once PRN Blood Glucose LESS THAN 70 milliGRAM(s)/deciliter  diphenhydrAMINE 50 milliGRAM(s) Oral daily PRN Allergy symptoms  melatonin 3 milliGRAM(s) Oral at bedtime PRN Insomnia  ondansetron Injectable 4 milliGRAM(s) IV Push every 8 hours PRN Nausea and/or Vomiting    CAPILLARY BLOOD GLUCOSE  POCT Blood Glucose.: 79 mg/dL (03 Nov 2023 23:39)  POCT Blood Glucose.: 78 mg/dL (03 Nov 2023 22:39)  POCT Blood Glucose.: 74 mg/dL (03 Nov 2023 22:13)  POCT Blood Glucose.: 63 mg/dL (03 Nov 2023 22:11)  POCT Blood Glucose.: 229 mg/dL (03 Nov 2023 17:22)  POCT Blood Glucose.: 358 mg/dL (03 Nov 2023 12:18)  POCT Blood Glucose.: 219 mg/dL (03 Nov 2023 08:23)    I&O's Summary    02 Nov 2023 07:01  -  03 Nov 2023 07:00  --------------------------------------------------------  IN: 220 mL / OUT: 400 mL / NET: -180 mL    03 Nov 2023 07:01  -  04 Nov 2023 06:45  --------------------------------------------------------  IN: 240 mL / OUT: 0 mL / NET: 240 mL    PHYSICAL EXAM:  Vital Signs Last 24 Hrs  T(C): 36.6 (04 Nov 2023 05:19), Max: 36.8 (03 Nov 2023 21:31)  T(F): 97.9 (04 Nov 2023 05:19), Max: 98.3 (03 Nov 2023 21:31)  HR: 76 (04 Nov 2023 05:19) (73 - 84)  BP: 117/62 (04 Nov 2023 05:19) (117/62 - 139/63)  BP(mean): --  RR: 18 (04 Nov 2023 05:19) (18 - 18)  SpO2: 100% (04 Nov 2023 05:19) (95% - 100%)    Parameters below as of 04 Nov 2023 05:19  Patient On (Oxygen Delivery Method): room air    GENERAL: NAD, well-developed  HEAD:  Atraumatic, Normocephalic, white plaques on tongue  EYES: PERRLA  NECK: Supple, No JVD  CHEST/LUNG: Expiratory and inspiratory wheeze, productive cough, course breath sounds, improving  HEART: Regular rate and rhythm  ABDOMEN: Soft, Nontender, Nondistended; colostomy bag in place  EXTREMITIES:  2+ Peripheral Pulses, No edema  NEUROLOGY: non-focal  SKIN: No rashes or lesions    LABS:                        9.9    13.03 )-----------( 380      ( 03 Nov 2023 10:26 )             35.0     11-03    139  |  101  |  20  ----------------------------<  327<H>  4.3   |  23  |  0.61    Ca    9.0      03 Nov 2023 10:26  Phos  3.0     11-03  Mg     2.1     11-03    TPro  6.2  /  Alb  3.6  /  TBili  <0.1<L>  /  DBili  x   /  AST  27  /  ALT  24  /  AlkPhos  81  11-03    PT/INR - ( 03 Nov 2023 10:26 )   PT: 15.8 sec;   INR: 1.45 ratio      Urinalysis Basic - ( 03 Nov 2023 10:26 )    Color: x / Appearance: x / SG: x / pH: x  Gluc: 327 mg/dL / Ketone: x  / Bili: x / Urobili: x   Blood: x / Protein: x / Nitrite: x   Leuk Esterase: x / RBC: x / WBC x   Sq Epi: x / Non Sq Epi: x / Bacteria: x    RADIOLOGY & ADDITIONAL TESTS:  Results Reviewed:   Imaging Personally Reviewed:  Electrocardiogram Personally Reviewed:  Tele: *******************************  Sylvia Osman MD (PGY-2)  Internal Medicine  Contact via [employer] TEAMS  [facility] Pager: [phone]  [facility] Pager: [phone]  *******************************    PROGRESS NOTE:     Patient is a 73y old  Female who presents with a chief complaint of ams x 2 days (03 Nov 2023 15:47)    INTERVAL EVENTS: hypoglycemic overnight to 60s    SUBJECTIVE: Patient seen and examined at bedside. This morning, the patient is comfortable and doing well. No acute complaints. Still pending MR head. Denies fevers, chills, N/V/D, chest pain, SOB, abdominal pain.    MEDICATIONS  (STANDING):  acetylcysteine 10%  Inhalation 2 milliLiter(s) Inhalation every 8 hours  acetylcysteine 10%  Inhalation 4 milliLiter(s) Inhalation two times a day  albuterol/ipratropium for Nebulization 3 milliLiter(s) Nebulizer every 6 hours  ascorbic acid 500 milliGRAM(s) Oral daily  atorvastatin 20 milliGRAM(s) Oral at bedtime  budesonide 160 MICROgram(s)/formoterol 4.5 MICROgram(s) Inhaler 2 Puff(s) Inhalation two times a day  clopidogrel Tablet 75 milliGRAM(s) Oral daily  dextrose 5%. 1000 milliLiter(s) (50 mL/Hr) IV Continuous <Continuous>  dextrose 5%. 1000 milliLiter(s) (100 mL/Hr) IV Continuous <Continuous>  dextrose 50% Injectable 25 Gram(s) IV Push once  dextrose 50% Injectable 12.5 Gram(s) IV Push once  ergocalciferol 07539 Unit(s) Oral every week  ertapenem  IVPB      ertapenem  IVPB 1000 milliGRAM(s) IV Intermittent every 24 hours  famotidine    Tablet 20 milliGRAM(s) Oral daily  gabapentin 100 milliGRAM(s) Oral every 8 hours  glucagon  Injectable 1 milliGRAM(s) IntraMuscular once  guaiFENesin  milliGRAM(s) Oral every 12 hours  influenza  Vaccine (HIGH DOSE) 0.7 milliLiter(s) IntraMuscular once  insulin glargine Injectable (LANTUS) 26 Unit(s) SubCutaneous at bedtime  insulin lispro (ADMELOG) corrective regimen sliding scale   SubCutaneous three times a day before meals  insulin lispro (ADMELOG) corrective regimen sliding scale   SubCutaneous at bedtime  insulin lispro Injectable (ADMELOG) 17 Unit(s) SubCutaneous three times a day before meals  levETIRAcetam 500 milliGRAM(s) Oral two times a day  methylPREDNISolone 28 milliGRAM(s) Oral daily  mexiletine 200 milliGRAM(s) Oral every 8 hours  montelukast 10 milliGRAM(s) Oral daily  multivitamin 1 Tablet(s) Oral daily  nystatin    Suspension 782923 Unit(s) Oral two times a day  pantoprazole    Tablet 40 milliGRAM(s) Oral before breakfast  polyethylene glycol 3350 17 Gram(s) Oral daily  senna 2 Tablet(s) Oral at bedtime  sodium chloride 3%  Inhalation 4 milliLiter(s) Inhalation every 6 hours    MEDICATIONS  (PRN):  acetaminophen     Tablet .. 650 milliGRAM(s) Oral every 6 hours PRN Temp greater or equal to 38C (100.4F), Mild Pain (1 - 3)  dextrose Oral Gel 15 Gram(s) Oral once PRN Blood Glucose LESS THAN 70 milliGRAM(s)/deciliter  diphenhydrAMINE 50 milliGRAM(s) Oral daily PRN Allergy symptoms  melatonin 3 milliGRAM(s) Oral at bedtime PRN Insomnia  ondansetron Injectable 4 milliGRAM(s) IV Push every 8 hours PRN Nausea and/or Vomiting    CAPILLARY BLOOD GLUCOSE  POCT Blood Glucose.: 79 mg/dL (03 Nov 2023 23:39)  POCT Blood Glucose.: 78 mg/dL (03 Nov 2023 22:39)  POCT Blood Glucose.: 74 mg/dL (03 Nov 2023 22:13)  POCT Blood Glucose.: 63 mg/dL (03 Nov 2023 22:11)  POCT Blood Glucose.: 229 mg/dL (03 Nov 2023 17:22)  POCT Blood Glucose.: 358 mg/dL (03 Nov 2023 12:18)  POCT Blood Glucose.: 219 mg/dL (03 Nov 2023 08:23)    I&O's Summary    02 Nov 2023 07:01  -  03 Nov 2023 07:00  --------------------------------------------------------  IN: 220 mL / OUT: 400 mL / NET: -180 mL    03 Nov 2023 07:01  -  04 Nov 2023 06:45  --------------------------------------------------------  IN: 240 mL / OUT: 0 mL / NET: 240 mL    PHYSICAL EXAM:  Vital Signs Last 24 Hrs  T(C): 36.6 (04 Nov 2023 05:19), Max: 36.8 (03 Nov 2023 21:31)  T(F): 97.9 (04 Nov 2023 05:19), Max: 98.3 (03 Nov 2023 21:31)  HR: 76 (04 Nov 2023 05:19) (73 - 84)  BP: 117/62 (04 Nov 2023 05:19) (117/62 - 139/63)  BP(mean): --  RR: 18 (04 Nov 2023 05:19) (18 - 18)  SpO2: 100% (04 Nov 2023 05:19) (95% - 100%)    Parameters below as of 04 Nov 2023 05:19  Patient On (Oxygen Delivery Method): room air    GENERAL: NAD, well-developed  HEAD:  Atraumatic, Normocephalic, white plaques on tongue  CHEST/LUNG: normal work of breathing, clear to auscultation  HEART: Regular rate and rhythm  ABDOMEN: Soft, Nontender, Nondistended; colostomy bag in place with soft brown stool  EXTREMITIES:  2+ Peripheral Pulses, No edema  NEUROLOGY: AAOx3, no focal deficits   SKIN: No rashes or lesions    LABS:                        9.9    13.03 )-----------( 380      ( 03 Nov 2023 10:26 )             35.0     11-03    139  |  101  |  20  ----------------------------<  327<H>  4.3   |  23  |  0.61    Ca    9.0      03 Nov 2023 10:26  Phos  3.0     11-03  Mg     2.1     11-03    TPro  6.2  /  Alb  3.6  /  TBili  <0.1<L>  /  DBili  x   /  AST  27  /  ALT  24  /  AlkPhos  81  11-03    PT/INR - ( 03 Nov 2023 10:26 )   PT: 15.8 sec;   INR: 1.45 ratio      Urinalysis Basic - ( 03 Nov 2023 10:26 )    Color: x / Appearance: x / SG: x / pH: x  Gluc: 327 mg/dL / Ketone: x  / Bili: x / Urobili: x   Blood: x / Protein: x / Nitrite: x   Leuk Esterase: x / RBC: x / WBC x   Sq Epi: x / Non Sq Epi: x / Bacteria: x    RADIOLOGY & ADDITIONAL TESTS:  Results Reviewed:   Imaging Personally Reviewed:  Electrocardiogram Personally Reviewed:  Tele:

## 2023-11-04 NOTE — PROGRESS NOTE ADULT - PROBLEM SELECTOR PLAN 2
- Lantus 26 U at bedtime  - Lispro w/ meals 17 U TID  - ISS - Lantus 26U at bedtime  - Lispro w/ meals 17 U TID  - ISS

## 2023-11-04 NOTE — PROVIDER CONTACT NOTE (HYPOGLYCEMIA EVENT) - NS PROVIDER CONTACT BACKGROUND-HYPO
Age: 73y    Gender: Female    POCT Blood Glucose:  79 mg/dL (11-03-23 @ 23:39)  78 mg/dL (11-03-23 @ 22:39)  74 mg/dL (11-03-23 @ 22:13)  63 mg/dL (11-03-23 @ 22:11)  229 mg/dL (11-03-23 @ 17:22)  358 mg/dL (11-03-23 @ 12:18)  219 mg/dL (11-03-23 @ 08:23)      eMAR:atorvastatin   20 milliGRAM(s) Oral (11-03-23 @ 21:23)    insulin glargine Injectable (LANTUS)   20 Unit(s) SubCutaneous (11-03-23 @ 23:59)    insulin lispro (ADMELOG) corrective regimen sliding scale   2 Unit(s) SubCutaneous (11-03-23 @ 18:14)   5 Unit(s) SubCutaneous (11-03-23 @ 13:08)   2 Unit(s) SubCutaneous (11-03-23 @ 08:38)    insulin lispro Injectable (ADMELOG)   17 Unit(s) SubCutaneous (11-03-23 @ 18:15)   17 Unit(s) SubCutaneous (11-03-23 @ 13:08)   17 Unit(s) SubCutaneous (11-03-23 @ 08:39)    methylPREDNISolone   28 milliGRAM(s) Oral (11-03-23 @ 06:24)

## 2023-11-04 NOTE — PROGRESS NOTE ADULT - PROBLEM SELECTOR PLAN 1
unsteady gait w/ episodic confusion, now at baseline mental state, no acute findings on CT Head ,no focal deficits on exam, mild leukocytosis but no other evidence for acute infection  - Neuro-checks   - MR brain ordered - no recent infarct, white matter microangiopathic change mildly increased from 2021, scattered nonspecific microhemorrhage.   - Neurology consult   - B12, folate, RPR wnl  - Vit D at 25, started on 50,000 Units weekly x8 weeks  - fall precautions   - Patient scheduled for MRI Brain w/IV contrast today (11/3) at 7pm  - EEG with L frontal temporal sharp waves, no seizures. Patient started on Keppra 500mg BID unsteady gait w/ episodic confusion, now at baseline mental state, no acute findings on CT Head ,no focal deficits on exam, mild leukocytosis but no other evidence for acute infection  - Neuro-checks   - MR brain ordered - no recent infarct, white matter microangiopathic change mildly increased from 2021, scattered nonspecific microhemorrhage.   - Neurology consult   - B12, folate, RPR wnl  - Vit D at 25, started on 50,000 Units weekly x8 weeks  - fall precautions   - EEG with L frontal temporal sharp waves, no seizures. Patient started on Keppra 500mg BID  - pending MRI brain w/ IV contrast 23-Aug-2021 11:34

## 2023-11-05 ENCOUNTER — TRANSCRIPTION ENCOUNTER (OUTPATIENT)
Age: 73
End: 2023-11-05

## 2023-11-05 VITALS — TEMPERATURE: 98 F | OXYGEN SATURATION: 99 % | HEART RATE: 84 BPM | RESPIRATION RATE: 18 BRPM

## 2023-11-05 LAB
ALBUMIN SERPL ELPH-MCNC: 3.3 G/DL — SIGNIFICANT CHANGE UP (ref 3.3–5)
ALBUMIN SERPL ELPH-MCNC: 3.3 G/DL — SIGNIFICANT CHANGE UP (ref 3.3–5)
ALP SERPL-CCNC: 67 U/L — SIGNIFICANT CHANGE UP (ref 40–120)
ALP SERPL-CCNC: 67 U/L — SIGNIFICANT CHANGE UP (ref 40–120)
ALT FLD-CCNC: 25 U/L — SIGNIFICANT CHANGE UP (ref 10–45)
ALT FLD-CCNC: 25 U/L — SIGNIFICANT CHANGE UP (ref 10–45)
ANION GAP SERPL CALC-SCNC: 13 MMOL/L — SIGNIFICANT CHANGE UP (ref 5–17)
ANION GAP SERPL CALC-SCNC: 13 MMOL/L — SIGNIFICANT CHANGE UP (ref 5–17)
AST SERPL-CCNC: 46 U/L — HIGH (ref 10–40)
AST SERPL-CCNC: 46 U/L — HIGH (ref 10–40)
BASOPHILS # BLD AUTO: 0.02 K/UL — SIGNIFICANT CHANGE UP (ref 0–0.2)
BASOPHILS # BLD AUTO: 0.02 K/UL — SIGNIFICANT CHANGE UP (ref 0–0.2)
BASOPHILS NFR BLD AUTO: 0.2 % — SIGNIFICANT CHANGE UP (ref 0–2)
BASOPHILS NFR BLD AUTO: 0.2 % — SIGNIFICANT CHANGE UP (ref 0–2)
BILIRUB SERPL-MCNC: <0.1 MG/DL — LOW (ref 0.2–1.2)
BILIRUB SERPL-MCNC: <0.1 MG/DL — LOW (ref 0.2–1.2)
BUN SERPL-MCNC: 22 MG/DL — SIGNIFICANT CHANGE UP (ref 7–23)
BUN SERPL-MCNC: 22 MG/DL — SIGNIFICANT CHANGE UP (ref 7–23)
CALCIUM SERPL-MCNC: 8.7 MG/DL — SIGNIFICANT CHANGE UP (ref 8.4–10.5)
CALCIUM SERPL-MCNC: 8.7 MG/DL — SIGNIFICANT CHANGE UP (ref 8.4–10.5)
CHLORIDE SERPL-SCNC: 104 MMOL/L — SIGNIFICANT CHANGE UP (ref 96–108)
CHLORIDE SERPL-SCNC: 104 MMOL/L — SIGNIFICANT CHANGE UP (ref 96–108)
CO2 SERPL-SCNC: 24 MMOL/L — SIGNIFICANT CHANGE UP (ref 22–31)
CO2 SERPL-SCNC: 24 MMOL/L — SIGNIFICANT CHANGE UP (ref 22–31)
CREAT SERPL-MCNC: 0.6 MG/DL — SIGNIFICANT CHANGE UP (ref 0.5–1.3)
CREAT SERPL-MCNC: 0.6 MG/DL — SIGNIFICANT CHANGE UP (ref 0.5–1.3)
EGFR: 94 ML/MIN/1.73M2 — SIGNIFICANT CHANGE UP
EGFR: 94 ML/MIN/1.73M2 — SIGNIFICANT CHANGE UP
EOSINOPHIL # BLD AUTO: 0.09 K/UL — SIGNIFICANT CHANGE UP (ref 0–0.5)
EOSINOPHIL # BLD AUTO: 0.09 K/UL — SIGNIFICANT CHANGE UP (ref 0–0.5)
EOSINOPHIL NFR BLD AUTO: 0.7 % — SIGNIFICANT CHANGE UP (ref 0–6)
EOSINOPHIL NFR BLD AUTO: 0.7 % — SIGNIFICANT CHANGE UP (ref 0–6)
GLUCOSE BLDC GLUCOMTR-MCNC: 157 MG/DL — HIGH (ref 70–99)
GLUCOSE BLDC GLUCOMTR-MCNC: 157 MG/DL — HIGH (ref 70–99)
GLUCOSE BLDC GLUCOMTR-MCNC: 187 MG/DL — HIGH (ref 70–99)
GLUCOSE BLDC GLUCOMTR-MCNC: 187 MG/DL — HIGH (ref 70–99)
GLUCOSE BLDC GLUCOMTR-MCNC: 211 MG/DL — HIGH (ref 70–99)
GLUCOSE BLDC GLUCOMTR-MCNC: 211 MG/DL — HIGH (ref 70–99)
GLUCOSE SERPL-MCNC: 170 MG/DL — HIGH (ref 70–99)
GLUCOSE SERPL-MCNC: 170 MG/DL — HIGH (ref 70–99)
HCT VFR BLD CALC: 32.3 % — LOW (ref 34.5–45)
HCT VFR BLD CALC: 32.3 % — LOW (ref 34.5–45)
HGB BLD-MCNC: 9.3 G/DL — LOW (ref 11.5–15.5)
HGB BLD-MCNC: 9.3 G/DL — LOW (ref 11.5–15.5)
IMM GRANULOCYTES NFR BLD AUTO: 1.5 % — HIGH (ref 0–0.9)
IMM GRANULOCYTES NFR BLD AUTO: 1.5 % — HIGH (ref 0–0.9)
INR BLD: 2.12 RATIO — HIGH (ref 0.85–1.18)
INR BLD: 2.12 RATIO — HIGH (ref 0.85–1.18)
LYMPHOCYTES # BLD AUTO: 1.28 K/UL — SIGNIFICANT CHANGE UP (ref 1–3.3)
LYMPHOCYTES # BLD AUTO: 1.28 K/UL — SIGNIFICANT CHANGE UP (ref 1–3.3)
LYMPHOCYTES # BLD AUTO: 9.9 % — LOW (ref 13–44)
LYMPHOCYTES # BLD AUTO: 9.9 % — LOW (ref 13–44)
MAGNESIUM SERPL-MCNC: 2 MG/DL — SIGNIFICANT CHANGE UP (ref 1.6–2.6)
MAGNESIUM SERPL-MCNC: 2 MG/DL — SIGNIFICANT CHANGE UP (ref 1.6–2.6)
MCHC RBC-ENTMCNC: 20.4 PG — LOW (ref 27–34)
MCHC RBC-ENTMCNC: 20.4 PG — LOW (ref 27–34)
MCHC RBC-ENTMCNC: 28.8 GM/DL — LOW (ref 32–36)
MCHC RBC-ENTMCNC: 28.8 GM/DL — LOW (ref 32–36)
MCV RBC AUTO: 71 FL — LOW (ref 80–100)
MCV RBC AUTO: 71 FL — LOW (ref 80–100)
MONOCYTES # BLD AUTO: 1.29 K/UL — HIGH (ref 0–0.9)
MONOCYTES # BLD AUTO: 1.29 K/UL — HIGH (ref 0–0.9)
MONOCYTES NFR BLD AUTO: 9.9 % — SIGNIFICANT CHANGE UP (ref 2–14)
MONOCYTES NFR BLD AUTO: 9.9 % — SIGNIFICANT CHANGE UP (ref 2–14)
NEUTROPHILS # BLD AUTO: 10.09 K/UL — HIGH (ref 1.8–7.4)
NEUTROPHILS # BLD AUTO: 10.09 K/UL — HIGH (ref 1.8–7.4)
NEUTROPHILS NFR BLD AUTO: 77.8 % — HIGH (ref 43–77)
NEUTROPHILS NFR BLD AUTO: 77.8 % — HIGH (ref 43–77)
NRBC # BLD: 0 /100 WBCS — SIGNIFICANT CHANGE UP (ref 0–0)
NRBC # BLD: 0 /100 WBCS — SIGNIFICANT CHANGE UP (ref 0–0)
PHOSPHATE SERPL-MCNC: 3.3 MG/DL — SIGNIFICANT CHANGE UP (ref 2.5–4.5)
PHOSPHATE SERPL-MCNC: 3.3 MG/DL — SIGNIFICANT CHANGE UP (ref 2.5–4.5)
PLATELET # BLD AUTO: 330 K/UL — SIGNIFICANT CHANGE UP (ref 150–400)
PLATELET # BLD AUTO: 330 K/UL — SIGNIFICANT CHANGE UP (ref 150–400)
POTASSIUM SERPL-MCNC: 5.6 MMOL/L — HIGH (ref 3.5–5.3)
POTASSIUM SERPL-MCNC: 5.6 MMOL/L — HIGH (ref 3.5–5.3)
POTASSIUM SERPL-SCNC: 5.6 MMOL/L — HIGH (ref 3.5–5.3)
POTASSIUM SERPL-SCNC: 5.6 MMOL/L — HIGH (ref 3.5–5.3)
PROT SERPL-MCNC: 5.6 G/DL — LOW (ref 6–8.3)
PROT SERPL-MCNC: 5.6 G/DL — LOW (ref 6–8.3)
PROTHROM AB SERPL-ACNC: 22.8 SEC — HIGH (ref 9.5–13)
PROTHROM AB SERPL-ACNC: 22.8 SEC — HIGH (ref 9.5–13)
RBC # BLD: 4.55 M/UL — SIGNIFICANT CHANGE UP (ref 3.8–5.2)
RBC # BLD: 4.55 M/UL — SIGNIFICANT CHANGE UP (ref 3.8–5.2)
RBC # FLD: 21.8 % — HIGH (ref 10.3–14.5)
RBC # FLD: 21.8 % — HIGH (ref 10.3–14.5)
SODIUM SERPL-SCNC: 141 MMOL/L — SIGNIFICANT CHANGE UP (ref 135–145)
SODIUM SERPL-SCNC: 141 MMOL/L — SIGNIFICANT CHANGE UP (ref 135–145)
WBC # BLD: 12.97 K/UL — HIGH (ref 3.8–10.5)
WBC # BLD: 12.97 K/UL — HIGH (ref 3.8–10.5)
WBC # FLD AUTO: 12.97 K/UL — HIGH (ref 3.8–10.5)
WBC # FLD AUTO: 12.97 K/UL — HIGH (ref 3.8–10.5)

## 2023-11-05 PROCEDURE — 99239 HOSP IP/OBS DSCHRG MGMT >30: CPT

## 2023-11-05 RX ADMIN — Medication 28 MILLIGRAM(S): at 06:01

## 2023-11-05 RX ADMIN — Medication 1 TABLET(S): at 12:44

## 2023-11-05 RX ADMIN — CLOPIDOGREL BISULFATE 75 MILLIGRAM(S): 75 TABLET, FILM COATED ORAL at 12:43

## 2023-11-05 RX ADMIN — MONTELUKAST 10 MILLIGRAM(S): 4 TABLET, CHEWABLE ORAL at 12:44

## 2023-11-05 RX ADMIN — Medication 19 UNIT(S): at 09:17

## 2023-11-05 RX ADMIN — Medication 1: at 09:16

## 2023-11-05 RX ADMIN — BUDESONIDE AND FORMOTEROL FUMARATE DIHYDRATE 2 PUFF(S): 160; 4.5 AEROSOL RESPIRATORY (INHALATION) at 06:04

## 2023-11-05 RX ADMIN — MEXILETINE HYDROCHLORIDE 200 MILLIGRAM(S): 150 CAPSULE ORAL at 06:03

## 2023-11-05 RX ADMIN — Medication 500000 UNIT(S): at 06:01

## 2023-11-05 RX ADMIN — Medication 19 UNIT(S): at 12:48

## 2023-11-05 RX ADMIN — LEVETIRACETAM 500 MILLIGRAM(S): 250 TABLET, FILM COATED ORAL at 06:02

## 2023-11-05 RX ADMIN — FAMOTIDINE 20 MILLIGRAM(S): 10 INJECTION INTRAVENOUS at 12:43

## 2023-11-05 RX ADMIN — Medication 2: at 12:48

## 2023-11-05 RX ADMIN — GABAPENTIN 100 MILLIGRAM(S): 400 CAPSULE ORAL at 06:03

## 2023-11-05 RX ADMIN — SODIUM CHLORIDE 4 MILLILITER(S): 9 INJECTION INTRAMUSCULAR; INTRAVENOUS; SUBCUTANEOUS at 12:45

## 2023-11-05 RX ADMIN — Medication 3 MILLILITER(S): at 12:44

## 2023-11-05 RX ADMIN — Medication 50 MILLIGRAM(S): at 09:20

## 2023-11-05 RX ADMIN — Medication 600 MILLIGRAM(S): at 06:02

## 2023-11-05 RX ADMIN — ERTAPENEM SODIUM 120 MILLIGRAM(S): 1 INJECTION, POWDER, LYOPHILIZED, FOR SOLUTION INTRAMUSCULAR; INTRAVENOUS at 10:39

## 2023-11-05 RX ADMIN — Medication 500 MILLIGRAM(S): at 12:43

## 2023-11-05 RX ADMIN — PANTOPRAZOLE SODIUM 40 MILLIGRAM(S): 20 TABLET, DELAYED RELEASE ORAL at 06:01

## 2023-11-05 NOTE — DISCHARGE NOTE NURSING/CASE MANAGEMENT/SOCIAL WORK - PATIENT PORTAL LINK FT
You can access the FollowMyHealth Patient Portal offered by [facility] by registering at the following website: [email or web address]. By joining [employer]’s FollowMyHealth portal, you will also be able to view your health information using other applications (apps) compatible with our system.

## 2023-11-05 NOTE — PROGRESS NOTE ADULT - ATTENDING COMMENTS
72 female with severe persistent asthma, allergic rhinitis, TBM s/p tracheoplasty, and bronchiectasis presented with AMS and unsteady gait. Patient complaining of productive cough worse than baseline. Pulmonary consulted for further management. Patient is afebrile. No leukocytosis. SARS-CoV positive on RVP, suspect shedding ad per ID. Sputum growing Proteus. Has grown ESBL in past.    Still complains of increased cough and productive sputum. If symptoms persist would consider starting ertapanem to treat for bronchiectasis exacerbation. Follow up with ID.   Continue methylprednisolone in event acute asthma exacerbation is playing a role. Will determine taper based on clinical response.  Airway clearance, bronchodilator therapy
72F PMH severe persistent asthma on chronic steroids, bronchiectasis, history of recurrent pneumonia, tracheomalacia s/p tracheoplasty, DM2, A-Fib on warfarin, s/p AVR (2022) and TAVR (valve in valve) in Sept 2023, h/o colorectal cancer s/p colectomy and ostomy, and recent shingles and COVID-19 viral infection who presented with AMS, found to have worsening cough, sputum production, and wheezing consistent with asthma/bronchiectasis exacerbation. CT chest with tracheobronchial secretions and mucous plugging in the lower lobes. Sputum culture with ESBL Proteus mirabilis.    Recommend to treat with Ertapenem for 10-14 days. ID evaluation. Currently on methylprednisolone 32 mg daily, would taper down to home dose of 8 mg daily by decreasing by 4 mg every 3 days. Continue Symbicort 160-4.5 mcg 2 puffs twice daily, rinse after use. Discontinue Stiolto to avoid excess LABA/LAMA therapy. Continue montelukast. Airway clearance therapy with albuterol-ipratropium nebs, hypertonic saline nebs, Mucomyst nebs, incentive spirometry, chest PT, chest vest/acapella. Supplemental oxygen with NC@2 LPM PRN for goal SpO2>90%. Given history of peripheral eosinophilia and currently steroid-dependent, would consider treatment with biologic therapy (e.g., mepolizumab or benralizumab) as outpatient.    Will sign off, please call back with questions.
72F PMH asthma on chronic steroids, bronchiectasis, tracheomalacia s/p tracheoplasty, pAF, colorectal cancer s/p colectomy and ostomy, presents with several days of AMS which has now improved, and now is word finding difficulties only    #Encephalopathy  Secondary to COVID infection in September  - MR brain: Negative for recent infarct. White matter microangiopathic change is mildly increased comparing to 2021 and there are scattered nonspecific microhemorrhages.  - b12(normal), folate(normal), RVP(negative)  - Vitamin D (25.6) - insufficiency - started supplementation with 50K units x 8 weeks.  - Neuro eval appreciated.--> pt with abnormal signal on eeg --> pt started on keppra --> pt to get a MRI brain w/contrast to eval for a lesion    #Bronchiectasis  - Continue with aggressive pulmonary toilet   - c/w nebs  - pt on methylprednisole 28 mg continue taper as above    #Pneumonia due to ESBL proteus mirabilis   - pt reports symptomatic improvement.  - c/w ertapenem, will d/w pulm duration recommendations, --> 10 days course was recommended  - c/w acapella for secretion mobilization.    #AFib  - pt has been on coumadin  after her TAVR repair.  She was recommended to continue with Coumadin for 6 months then she would restart her Eliquis after the 6 month period.  - dose coumadin 7.5 mg today
72F PMH asthma on chronic steroids, bronchiectasis, tracheomalacia s/p tracheoplasty, pAF, colorectal cancer s/p colectomy and ostomy, presents with several days of AMS which has now improved, and now is word finding difficulties only    #Encephalopathy  Secondary to COVID infection in September  - MR brain: Negative for recent infarct. White matter microangiopathic change is mildly increased comparing to 2021 and there are scattered nonspecific microhemorrhages.  - b12(normal), folate(normal), RVP(negative)  - Vitamin D (25.6) - insufficiency - started supplementation with 50K units x 8 weeks.  - Neuro eval appreciated.--> pt with abnormal signal on eeg --> pt to be started in keppra  -    #Bronchiectasis  - Continue with aggressive pulmonary toilet   - c/w nebs  - pt on methylprednisole 32 mg to be tapered as above    #Pneumonia due to ESBL proteus mirabilis   - pt reports symptomatic improvement.  - c/w ertapenem, will d/w pulm duration recommendations, ?5days  - c/w acapella for secretion mobilization.    #AFib  - pt has been on coumadin  after her TAVR repair.  She was recommended to continue with Coumadin for 6 months then she would restart her Eliquis after the 6 month period.  - dose coumadin 3mg today
72F PMH asthma on chronic steroids, bronchiectasis, tracheomalacia s/p tracheoplasty, pAF, colorectal cancer s/p colectomy and ostomy, presents with several days of AMS which has now improved, and now is word finding difficulties only    #Encephalopathy  Secondary to COVID infection in September  - MR brain: Negative for recent infarct. White matter microangiopathic change is mildly increased comparing to 2021 and there are scattered nonspecific microhemorrhages.  - b12(normal), folate(normal), RVP(negative)  - Vitamin D (25.6) - insufficiency - will start supplementation with 50K units x 8 weeks.  - Neuro eval appreciated.    #Bronchiectasis  - Continue with aggressive pulmonary toilet   - c/w nebs    #Pneumonia due to ESBL proteus mirabilis   - pt reports symptomatic improvement.  - c/w ertapenem, will d/w pulm duration recommendations, ?5days  - c/w acapella for secretion mobilization.    #AFib  - pt has been on coumadin  after her TAVR repair.  She was recommended to continue with Coumadin for 6 months then she would restart her Eliquis after the 6 month period.  - dose coumadin 3mg today
72F PMH asthma on chronic steroids, bronchiectasis, tracheomalacia s/p tracheoplasty, pAF, colorectal cancer s/p colectomy and ostomy, presents with several days of AMS which has now improved, and now is word finding difficulties only    #Encephalopathy  Secondary to COVID infection in September  - MR brain: Negative for recent infarct. White matter microangiopathic change is mildly increased comparing to 2021 and there are scattered nonspecific microhemorrhages.  - b12(normal), folate(normal), RVP(negative)  - Vitamin D (25.6) - insufficiency - started supplementation with 50K units x 8 weeks.  - Neuro eval appreciated.--> pt with abnormal signal on eeg --> pt started on keppra --> MRI stable  - dc today    #Bronchiectasis  - Continue with aggressive pulmonary toilet   - c/w nebs  - pt on methylprednisole 28 mg continue taper as above    #Pneumonia due to ESBL proteus mirabilis   - pt reports symptomatic improvement.  - c/w ertapenem, will d/w pulm duration recommendations, --> 10 days course was recommended  - c/w acapella for secretion mobilization.    #AFib  - pt has been on coumadin  after her TAVR repair.  She was recommended to continue with Coumadin for 6 months then she would restart her Eliquis after the 6 month period.  - dose coumadin 7.5 mg .    40 min coordinating dc planning
72F PMH asthma on chronic steroids, bronchiectasis, tracheomalacia s/p tracheoplasty, pAF, colorectal cancer s/p colectomy and ostomy, presents with several days of AMS which has now improved, and now is word finding difficulties only    #Encephalopathy  Secondary to COVID infection in September  - MR brain: Negative for recent infarct. White matter microangiopathic change is mildly increased comparing to 2021 and there are scattered nonspecific microhemorrhages.    #Bronchiectasis  - Pulm and ID following, appreciate ID recommendations   - Continue with aggressive pulmonary toilet   - Sputum cultures sent, follow up results   - pt now with productive yellow sputum ---> will start pt on ertapenem      pt is on coumadin will need to clarify why patient is not on a DOAC.
72F PMH asthma on chronic steroids, bronchiectasis, tracheomalacia s/p tracheoplasty, pAF, colorectal cancer s/p colectomy and ostomy, presents with several days of AMS which has now improved, and now is word finding difficulties only    #Encephalopathy  Secondary to COVID infection in September  - MR brain ordered, will follow up results    #Bronchiectasis  - Pulm and ID following, appreciate ID recommendations   - Continue with aggressive pulmonary toilet as described above  - Sputum cultures sent, follow up results   - Continue to follow off antibiotics at this time    Remainder as above
72F PMH asthma on chronic steroids, bronchiectasis, tracheomalacia s/p tracheoplasty, pAF, colorectal cancer s/p colectomy and ostomy, presents with several days of AMS which has now improved, and now is word finding difficulties only    #Encephalopathy  Secondary to COVID infection in September  - MR brain: Negative for recent infarct. White matter microangiopathic change is mildly increased comparing to 2021 and there are scattered nonspecific microhemorrhages.  - b12(normal), folate(normal), RVP(negative)  - Vitamin D (25.6) - insufficiency - started supplementation with 50K units x 8 weeks.  - Neuro eval appreciated.--> pt with abnormal signal on eeg --> pt started on keppra --> pt to get a MRI brain w/contrast to eval for a lesion (planned for today 11/4 at 8pm)    #Bronchiectasis  - Continue with aggressive pulmonary toilet   - c/w nebs  - pt on methylprednisole 28 mg continue taper as above    #Pneumonia due to ESBL proteus mirabilis   - pt reports symptomatic improvement.  - c/w ertapenem, will d/w pulm duration recommendations, --> 10 days course was recommended  - c/w acapella for secretion mobilization.    #AFib  - pt has been on coumadin  after her TAVR repair.  She was recommended to continue with Coumadin for 6 months then she would restart her Eliquis after the 6 month period.  - dose coumadin 7.5 mg
72F PMH asthma on chronic steroids, bronchiectasis, tracheomalacia s/p tracheoplasty, pAF, colorectal cancer s/p colectomy and ostomy, presents with several days of AMS which has now improved, and now is word finding difficulties only    #Encephalopathy  Secondary to COVID infection in September  - MR brain: Negative for recent infarct. White matter microangiopathic change is mildly increased comparing to 2021 and there are scattered nonspecific microhemorrhages.  - will check b12, folate, RVP, thiamine    #Bronchiectasis  - Pulm and ID following, appreciate ID recommendations   - Continue with aggressive pulmonary toilet   - Sputum cultures sent, follow up results     #Pneumonia due to ESBL proteus mirabilis   - pt now with productive yellow sputum ---> started on ertapenem  - start acapella  - ID following    #AFib  - pt has been on coumadin  after her TAVR repair.  She was recommended to continue with Coumadin for 6 months then she would restart her Eliquis after the 6 month period.  - dose coumadin 3mg today

## 2023-11-05 NOTE — PROGRESS NOTE ADULT - ASSESSMENT
72F w/ asthma on chronic steroids, bronchiectasis,  recurrent PNA,  tracheomalacia s/p tracheoplasty, DM  paroxysmal A-fib on coumadin, colorectal cancer s/p colectomy and ostomy ,  s/p AVR (2022) and  TAVR- [facility] 9 on 9/6 , recent shingles , as well as covid infection;  p/w AMS x few days, pending MRI.  72F w/ asthma on chronic steroids, bronchiectasis,  recurrent PNA,  tracheomalacia s/p tracheoplasty, DM  paroxysmal A-fib on coumadin, colorectal cancer s/p colectomy and ostomy ,  s/p AVR (2022) and  TAVR- [facility] 9 on 9/6 , recent shingles , as well as covid infection;  p/w AMS x few days, MR Head negative for intracranial bleeds.

## 2023-11-05 NOTE — PROGRESS NOTE ADULT - SUBJECTIVE AND OBJECTIVE BOX
*******************************  Jonathan Whitaker MD TEAMS  *******************************    PROGRESS NOTE:     Patient is a 73y old  Female who presents with a chief complaint of ams x 2 days (03 Nov 2023 15:47)    INTERVAL EVENTS: hypoglycemic overnight to 60s    SUBJECTIVE: Patient seen and examined at bedside. This morning, the patient is comfortable and doing well. No acute complaints. Still pending MR head. Denies fevers, chills, N/V/D, chest pain, SOB, abdominal pain.    MEDICATIONS  (STANDING):  acetylcysteine 10%  Inhalation 2 milliLiter(s) Inhalation every 8 hours  acetylcysteine 10%  Inhalation 4 milliLiter(s) Inhalation two times a day  albuterol/ipratropium for Nebulization 3 milliLiter(s) Nebulizer every 6 hours  ascorbic acid 500 milliGRAM(s) Oral daily  atorvastatin 20 milliGRAM(s) Oral at bedtime  budesonide 160 MICROgram(s)/formoterol 4.5 MICROgram(s) Inhaler 2 Puff(s) Inhalation two times a day  clopidogrel Tablet 75 milliGRAM(s) Oral daily  dextrose 5%. 1000 milliLiter(s) (50 mL/Hr) IV Continuous <Continuous>  dextrose 5%. 1000 milliLiter(s) (100 mL/Hr) IV Continuous <Continuous>  dextrose 50% Injectable 25 Gram(s) IV Push once  dextrose 50% Injectable 12.5 Gram(s) IV Push once  ergocalciferol 84831 Unit(s) Oral every week  ertapenem  IVPB      ertapenem  IVPB 1000 milliGRAM(s) IV Intermittent every 24 hours  famotidine    Tablet 20 milliGRAM(s) Oral daily  gabapentin 100 milliGRAM(s) Oral every 8 hours  glucagon  Injectable 1 milliGRAM(s) IntraMuscular once  guaiFENesin  milliGRAM(s) Oral every 12 hours  influenza  Vaccine (HIGH DOSE) 0.7 milliLiter(s) IntraMuscular once  insulin glargine Injectable (LANTUS) 26 Unit(s) SubCutaneous at bedtime  insulin lispro (ADMELOG) corrective regimen sliding scale   SubCutaneous three times a day before meals  insulin lispro (ADMELOG) corrective regimen sliding scale   SubCutaneous at bedtime  insulin lispro Injectable (ADMELOG) 17 Unit(s) SubCutaneous three times a day before meals  levETIRAcetam 500 milliGRAM(s) Oral two times a day  methylPREDNISolone 28 milliGRAM(s) Oral daily  mexiletine 200 milliGRAM(s) Oral every 8 hours  montelukast 10 milliGRAM(s) Oral daily  multivitamin 1 Tablet(s) Oral daily  nystatin    Suspension 477293 Unit(s) Oral two times a day  pantoprazole    Tablet 40 milliGRAM(s) Oral before breakfast  polyethylene glycol 3350 17 Gram(s) Oral daily  senna 2 Tablet(s) Oral at bedtime  sodium chloride 3%  Inhalation 4 milliLiter(s) Inhalation every 6 hours    MEDICATIONS  (PRN):  acetaminophen     Tablet .. 650 milliGRAM(s) Oral every 6 hours PRN Temp greater or equal to 38C (100.4F), Mild Pain (1 - 3)  dextrose Oral Gel 15 Gram(s) Oral once PRN Blood Glucose LESS THAN 70 milliGRAM(s)/deciliter  diphenhydrAMINE 50 milliGRAM(s) Oral daily PRN Allergy symptoms  melatonin 3 milliGRAM(s) Oral at bedtime PRN Insomnia  ondansetron Injectable 4 milliGRAM(s) IV Push every 8 hours PRN Nausea and/or Vomiting    CAPILLARY BLOOD GLUCOSE  POCT Blood Glucose.: 79 mg/dL (03 Nov 2023 23:39)  POCT Blood Glucose.: 78 mg/dL (03 Nov 2023 22:39)  POCT Blood Glucose.: 74 mg/dL (03 Nov 2023 22:13)  POCT Blood Glucose.: 63 mg/dL (03 Nov 2023 22:11)  POCT Blood Glucose.: 229 mg/dL (03 Nov 2023 17:22)  POCT Blood Glucose.: 358 mg/dL (03 Nov 2023 12:18)  POCT Blood Glucose.: 219 mg/dL (03 Nov 2023 08:23)    I&O's Summary    02 Nov 2023 07:01  -  03 Nov 2023 07:00  --------------------------------------------------------  IN: 220 mL / OUT: 400 mL / NET: -180 mL    03 Nov 2023 07:01  -  04 Nov 2023 06:45  --------------------------------------------------------  IN: 240 mL / OUT: 0 mL / NET: 240 mL    PHYSICAL EXAM:  Vital Signs Last 24 Hrs  T(C): 36.6 (04 Nov 2023 05:19), Max: 36.8 (03 Nov 2023 21:31)  T(F): 97.9 (04 Nov 2023 05:19), Max: 98.3 (03 Nov 2023 21:31)  HR: 76 (04 Nov 2023 05:19) (73 - 84)  BP: 117/62 (04 Nov 2023 05:19) (117/62 - 139/63)  BP(mean): --  RR: 18 (04 Nov 2023 05:19) (18 - 18)  SpO2: 100% (04 Nov 2023 05:19) (95% - 100%)    Parameters below as of 04 Nov 2023 05:19  Patient On (Oxygen Delivery Method): room air    GENERAL: NAD, well-developed  HEAD:  Atraumatic, Normocephalic, white plaques on tongue  CHEST/LUNG: normal work of breathing, clear to auscultation  HEART: Regular rate and rhythm  ABDOMEN: Soft, Nontender, Nondistended; colostomy bag in place with soft brown stool  EXTREMITIES:  2+ Peripheral Pulses, No edema  NEUROLOGY: AAOx3, no focal deficits   SKIN: No rashes or lesions    LABS:                        9.9    13.03 )-----------( 380      ( 03 Nov 2023 10:26 )             35.0     11-03    139  |  101  |  20  ----------------------------<  327<H>  4.3   |  23  |  0.61    Ca    9.0      03 Nov 2023 10:26  Phos  3.0     11-03  Mg     2.1     11-03    TPro  6.2  /  Alb  3.6  /  TBili  <0.1<L>  /  DBili  x   /  AST  27  /  ALT  24  /  AlkPhos  81  11-03    PT/INR - ( 03 Nov 2023 10:26 )   PT: 15.8 sec;   INR: 1.45 ratio      Urinalysis Basic - ( 03 Nov 2023 10:26 )    Color: x / Appearance: x / SG: x / pH: x  Gluc: 327 mg/dL / Ketone: x  / Bili: x / Urobili: x   Blood: x / Protein: x / Nitrite: x   Leuk Esterase: x / RBC: x / WBC x   Sq Epi: x / Non Sq Epi: x / Bacteria: x    RADIOLOGY & ADDITIONAL TESTS:  Results Reviewed:   Imaging Personally Reviewed:  Electrocardiogram Personally Reviewed:  Tele: *******************************  Jonathan Whitaker MD TEAMS  *******************************    PROGRESS NOTE:     Patient is a 73y old  Female who presents with a chief complaint of ams x 2 days (03 Nov 2023 15:47)    INTERVAL EVENTS: hypoglycemic overnight to 60s    SUBJECTIVE: Patient seen and examined at bedside. This morning, the patient is comfortable and doing well. No acute complaints. MR Head negative for bleeds. Patient ready for discharge. Denies fevers, chills, N/V/D, chest pain, SOB, abdominal pain.    MEDICATIONS  (STANDING):  acetylcysteine 10%  Inhalation 2 milliLiter(s) Inhalation every 8 hours  acetylcysteine 10%  Inhalation 4 milliLiter(s) Inhalation two times a day  albuterol/ipratropium for Nebulization 3 milliLiter(s) Nebulizer every 6 hours  ascorbic acid 500 milliGRAM(s) Oral daily  atorvastatin 20 milliGRAM(s) Oral at bedtime  budesonide 160 MICROgram(s)/formoterol 4.5 MICROgram(s) Inhaler 2 Puff(s) Inhalation two times a day  clopidogrel Tablet 75 milliGRAM(s) Oral daily  dextrose 5%. 1000 milliLiter(s) (50 mL/Hr) IV Continuous <Continuous>  dextrose 5%. 1000 milliLiter(s) (100 mL/Hr) IV Continuous <Continuous>  dextrose 50% Injectable 25 Gram(s) IV Push once  dextrose 50% Injectable 12.5 Gram(s) IV Push once  ergocalciferol 72825 Unit(s) Oral every week  ertapenem  IVPB      ertapenem  IVPB 1000 milliGRAM(s) IV Intermittent every 24 hours  famotidine    Tablet 20 milliGRAM(s) Oral daily  gabapentin 100 milliGRAM(s) Oral every 8 hours  glucagon  Injectable 1 milliGRAM(s) IntraMuscular once  guaiFENesin  milliGRAM(s) Oral every 12 hours  influenza  Vaccine (HIGH DOSE) 0.7 milliLiter(s) IntraMuscular once  insulin glargine Injectable (LANTUS) 26 Unit(s) SubCutaneous at bedtime  insulin lispro (ADMELOG) corrective regimen sliding scale   SubCutaneous three times a day before meals  insulin lispro (ADMELOG) corrective regimen sliding scale   SubCutaneous at bedtime  insulin lispro Injectable (ADMELOG) 17 Unit(s) SubCutaneous three times a day before meals  levETIRAcetam 500 milliGRAM(s) Oral two times a day  methylPREDNISolone 28 milliGRAM(s) Oral daily  mexiletine 200 milliGRAM(s) Oral every 8 hours  montelukast 10 milliGRAM(s) Oral daily  multivitamin 1 Tablet(s) Oral daily  nystatin    Suspension 068008 Unit(s) Oral two times a day  pantoprazole    Tablet 40 milliGRAM(s) Oral before breakfast  polyethylene glycol 3350 17 Gram(s) Oral daily  senna 2 Tablet(s) Oral at bedtime  sodium chloride 3%  Inhalation 4 milliLiter(s) Inhalation every 6 hours    MEDICATIONS  (PRN):  acetaminophen     Tablet .. 650 milliGRAM(s) Oral every 6 hours PRN Temp greater or equal to 38C (100.4F), Mild Pain (1 - 3)  dextrose Oral Gel 15 Gram(s) Oral once PRN Blood Glucose LESS THAN 70 milliGRAM(s)/deciliter  diphenhydrAMINE 50 milliGRAM(s) Oral daily PRN Allergy symptoms  melatonin 3 milliGRAM(s) Oral at bedtime PRN Insomnia  ondansetron Injectable 4 milliGRAM(s) IV Push every 8 hours PRN Nausea and/or Vomiting    CAPILLARY BLOOD GLUCOSE  POCT Blood Glucose.: 79 mg/dL (03 Nov 2023 23:39)  POCT Blood Glucose.: 78 mg/dL (03 Nov 2023 22:39)  POCT Blood Glucose.: 74 mg/dL (03 Nov 2023 22:13)  POCT Blood Glucose.: 63 mg/dL (03 Nov 2023 22:11)  POCT Blood Glucose.: 229 mg/dL (03 Nov 2023 17:22)  POCT Blood Glucose.: 358 mg/dL (03 Nov 2023 12:18)  POCT Blood Glucose.: 219 mg/dL (03 Nov 2023 08:23)    I&O's Summary    02 Nov 2023 07:01  -  03 Nov 2023 07:00  --------------------------------------------------------  IN: 220 mL / OUT: 400 mL / NET: -180 mL    03 Nov 2023 07:01  -  04 Nov 2023 06:45  --------------------------------------------------------  IN: 240 mL / OUT: 0 mL / NET: 240 mL    PHYSICAL EXAM:  Vital Signs Last 24 Hrs  T(C): 36.6 (04 Nov 2023 05:19), Max: 36.8 (03 Nov 2023 21:31)  T(F): 97.9 (04 Nov 2023 05:19), Max: 98.3 (03 Nov 2023 21:31)  HR: 76 (04 Nov 2023 05:19) (73 - 84)  BP: 117/62 (04 Nov 2023 05:19) (117/62 - 139/63)  BP(mean): --  RR: 18 (04 Nov 2023 05:19) (18 - 18)  SpO2: 100% (04 Nov 2023 05:19) (95% - 100%)    Parameters below as of 04 Nov 2023 05:19  Patient On (Oxygen Delivery Method): room air    GENERAL: NAD, well-developed  HEAD:  Atraumatic, Normocephalic, white plaques on tongue  CHEST/LUNG: normal work of breathing, clear to auscultation  HEART: Regular rate and rhythm  ABDOMEN: Soft, Nontender, Nondistended; colostomy bag in place with soft brown stool  EXTREMITIES:  2+ Peripheral Pulses, No edema  NEUROLOGY: AAOx3, no focal deficits   SKIN: No rashes or lesions    LABS:                        9.9    13.03 )-----------( 380      ( 03 Nov 2023 10:26 )             35.0     11-03    139  |  101  |  20  ----------------------------<  327<H>  4.3   |  23  |  0.61    Ca    9.0      03 Nov 2023 10:26  Phos  3.0     11-03  Mg     2.1     11-03    TPro  6.2  /  Alb  3.6  /  TBili  <0.1<L>  /  DBili  x   /  AST  27  /  ALT  24  /  AlkPhos  81  11-03    PT/INR - ( 03 Nov 2023 10:26 )   PT: 15.8 sec;   INR: 1.45 ratio      Urinalysis Basic - ( 03 Nov 2023 10:26 )    Color: x / Appearance: x / SG: x / pH: x  Gluc: 327 mg/dL / Ketone: x  / Bili: x / Urobili: x   Blood: x / Protein: x / Nitrite: x   Leuk Esterase: x / RBC: x / WBC x   Sq Epi: x / Non Sq Epi: x / Bacteria: x    RADIOLOGY & ADDITIONAL TESTS:  Results Reviewed:   Imaging Personally Reviewed:  Electrocardiogram Personally Reviewed:  Tele:

## 2023-11-05 NOTE — PROGRESS NOTE ADULT - REASON FOR ADMISSION
ams x 2 days

## 2023-11-05 NOTE — PROGRESS NOTE ADULT - PROBLEM SELECTOR PLAN 3
Taking 7.5mg at home  - monitor daily INR, coumadin dosing based on INR  - Continue with Plavix 75 mg PO daily   - Continue with Mexiletine 200 mg every 8 hours   - Continue with Atorvastatin 20 mg PO HS

## 2023-11-05 NOTE — DISCHARGE NOTE NURSING/CASE MANAGEMENT/SOCIAL WORK - NSDCVIVACCINE_GEN_ALL_CORE_FT
COVID-19, mRNA, LNP-S, PF, 100 mcg/ 0.5 mL dose (Moderna); 06-Dec-2021 17:12; Afshan Lew ([facility]); [employer] [id number] (Exp. Date: 22-Dec-2021); IntraMuscular; Deltoid Left.; 0.25 milliLiter(s);

## 2023-11-05 NOTE — PROGRESS NOTE ADULT - PROBLEM SELECTOR PLAN 4
- Symbicort 160 2 puffs BID  - Duonebs q6 standing   - Montelukast   - Pulm consult - increase chronic steroid dose to 32 mg methylprednisolone
- Symbicort 160 2 puffs BID  - Duonebs q6 standing   - Montelukast   - Pulm consult - increase chronic steroid dose to 32 mg methylprednisolone - will begin taper by decreasing 4mg every 3 days to return to home dose of 8mg.
- Symbicort (therapeutic equivalent for Breo)   - Duonebs PRN   - Tiotropium   - montelukast   - Pulm consult - increase chronic steroid dose to 32 mg methylprednisolone
- Symbicort 160 2 puffs BID  - Duonebs q6 standing   - Montelukast   - Pulm consult - increase chronic steroid dose to 32 mg methylprednisolone - will begin taper by decreasing 4mg every 3 days to return to home dose of 8mg. Starting 28mg (11/3-11/6)
- Symbicort (therapeutic equivalent for Breo)   - Duonebs PRN   - Tiotropium   - montelukast   - Pulm consult - increase chronic steroid dose to 32 mg methylprednisolone

## 2023-11-05 NOTE — PROGRESS NOTE ADULT - PROBLEM SELECTOR PLAN 1
unsteady gait w/ episodic confusion, now at baseline mental state, no acute findings on CT Head ,no focal deficits on exam, mild leukocytosis but no other evidence for acute infection  - Neuro-checks   - MR brain ordered - no recent infarct, white matter microangiopathic change mildly increased from 2021, scattered nonspecific microhemorrhage.   - Neurology consult   - B12, folate, RPR wnl  - Vit D at 25, started on 50,000 Units weekly x8 weeks  - fall precautions   - EEG with L frontal temporal sharp waves, no seizures. Patient started on Keppra 500mg BID  - pending MRI brain w/ IV contrast unsteady gait w/ episodic confusion, now at baseline mental state, no acute findings on CT Head ,no focal deficits on exam, mild leukocytosis but no other evidence for acute infection  - Neuro-checks   - MR brain ordered - no recent infarct, white matter microangiopathic change mildly increased from 2021, scattered nonspecific microhemorrhage.   - Neurology consult   - B12, folate, RPR wnl  - Vit D at 25, started on 50,000 Units weekly x8 weeks  - fall precautions   - EEG with L frontal temporal sharp waves, no seizures. Patient started on Keppra 500mg BID  - MR Brain negative for intracranial bleed

## 2023-11-05 NOTE — PROGRESS NOTE ADULT - PROVIDER SPECIALTY LIST ADULT
Neurology
Pulmonology
Pulmonology
Internal Medicine
Infectious Disease
Internal Medicine
Pulmonology
Internal Medicine

## 2023-11-05 NOTE — PROGRESS NOTE ADULT - PROBLEM SELECTOR PROBLEM 3
S/P TAVR (transcatheter aortic valve replacement)

## 2023-11-05 NOTE — PROGRESS NOTE ADULT - PROBLEM SELECTOR PLAN 5
CT Chest - Mucoid impaction and atelectasis  - Pulmonary consult, appreciate recs  - Start airway clearance: humidified oxygen, duonebs q6h, 3% nebulized saline q6h, chest PT q8 h, acapella q6 h, chest vest q12h  - titrate oxygen to O2 >88%, use humidified oxygen  - incentive spirometry, OOB to chair, PT/OT  - Sputum culture with ESBL proteus mirabilis   - C/w Ertapenem, PO benadryl 30 minutes prior to administration (10/30- ) Plan for 10 day course through 11/13  - Appointment made with patient's pulmonologist Dr. Allison on 11/13 at 5:30 PM

## 2023-11-05 NOTE — PROGRESS NOTE ADULT - PROBLEM SELECTOR PROBLEM 1
Altered mental state

## 2023-11-06 ENCOUNTER — LABORATORY RESULT (OUTPATIENT)
Age: 73
End: 2023-11-06

## 2023-11-06 LAB
VIT B1 SERPL-MCNC: 162.4 NMOL/L — SIGNIFICANT CHANGE UP (ref 66.5–200)
VIT B1 SERPL-MCNC: 162.4 NMOL/L — SIGNIFICANT CHANGE UP (ref 66.5–200)

## 2023-11-06 RX ORDER — LEVETIRACETAM 250 MG/1
1 TABLET, FILM COATED ORAL
Qty: 60 | Refills: 0
Start: 2023-11-06 | End: 2023-12-05

## 2023-11-06 RX ORDER — LEVETIRACETAM 250 MG/1
1 TABLET, FILM COATED ORAL
Qty: 60 | Refills: 0
Start: 2023-11-06

## 2023-11-06 RX ORDER — LEVETIRACETAM 100 MG/ML
2 INJECTION INTRAVENOUS
Refills: 0 | DISCHARGE
Start: 2023-11-06

## 2023-11-07 ENCOUNTER — APPOINTMENT (OUTPATIENT)
Dept: NEUROLOGY | Facility: CLINIC | Age: 73
End: 2023-11-07
Payer: MEDICARE

## 2023-11-07 ENCOUNTER — NON-APPOINTMENT (OUTPATIENT)
Age: 73
End: 2023-11-07

## 2023-11-07 VITALS
WEIGHT: 143 LBS | HEART RATE: 86 BPM | HEIGHT: 67 IN | SYSTOLIC BLOOD PRESSURE: 160 MMHG | DIASTOLIC BLOOD PRESSURE: 74 MMHG | BODY MASS INDEX: 22.44 KG/M2

## 2023-11-07 LAB
PYRIDOXAL PHOS SERPL-MCNC: 14.9 UG/L — SIGNIFICANT CHANGE UP (ref 3.4–65.2)
PYRIDOXAL PHOS SERPL-MCNC: 14.9 UG/L — SIGNIFICANT CHANGE UP (ref 3.4–65.2)

## 2023-11-07 PROCEDURE — 99203 OFFICE O/P NEW LOW 30 MIN: CPT

## 2023-11-08 NOTE — DIETITIAN INITIAL EVALUATION ADULT - NAME AND PHONE
11/08/23                            Vasiliy Cabrera  [id number] [phone]    To Whom It May Concern:    This is to certify Vasiliy JOSUE Cabrera was evaluated with Negrito Rangel DO on 11/08/23 and is excused from school on 11/8/23.     RESTRICTIONS: None            Electronically signed by:  Negrito Rangel DO  [address] [id number]  Dept Phone: [phone]       
Archana Watt MS, RDN, [facility] [phone]  Nutrition

## 2023-11-09 ENCOUNTER — LABORATORY RESULT (OUTPATIENT)
Age: 73
End: 2023-11-09

## 2023-11-11 LAB
COPPER SERPL-MCNC: 129 UG/DL — SIGNIFICANT CHANGE UP (ref 80–158)
COPPER SERPL-MCNC: 129 UG/DL — SIGNIFICANT CHANGE UP (ref 80–158)

## 2023-11-12 LAB
ZINC SERPL-MCNC: 81 UG/DL — SIGNIFICANT CHANGE UP (ref 44–115)
ZINC SERPL-MCNC: 81 UG/DL — SIGNIFICANT CHANGE UP (ref 44–115)

## 2023-11-13 ENCOUNTER — LABORATORY RESULT (OUTPATIENT)
Age: 73
End: 2023-11-13

## 2023-11-13 ENCOUNTER — APPOINTMENT (OUTPATIENT)
Dept: PULMONOLOGY | Facility: CLINIC | Age: 73
End: 2023-11-13
Payer: MEDICARE

## 2023-11-13 VITALS
OXYGEN SATURATION: 97 % | RESPIRATION RATE: 16 BRPM | TEMPERATURE: 97.6 F | WEIGHT: 143 LBS | BODY MASS INDEX: 22.44 KG/M2 | HEIGHT: 67 IN | HEART RATE: 99 BPM

## 2023-11-13 PROCEDURE — 87186 SC STD MICRODIL/AGAR DIL: CPT

## 2023-11-13 PROCEDURE — 85025 COMPLETE CBC W/AUTO DIFF WBC: CPT

## 2023-11-13 PROCEDURE — 94010 BREATHING CAPACITY TEST: CPT

## 2023-11-13 PROCEDURE — 74176 CT ABD & PELVIS W/O CONTRAST: CPT

## 2023-11-13 PROCEDURE — 87040 BLOOD CULTURE FOR BACTERIA: CPT

## 2023-11-13 PROCEDURE — 87086 URINE CULTURE/COLONY COUNT: CPT

## 2023-11-13 PROCEDURE — 84100 ASSAY OF PHOSPHORUS: CPT

## 2023-11-13 PROCEDURE — 82728 ASSAY OF FERRITIN: CPT

## 2023-11-13 PROCEDURE — 85384 FIBRINOGEN ACTIVITY: CPT

## 2023-11-13 PROCEDURE — 84132 ASSAY OF SERUM POTASSIUM: CPT

## 2023-11-13 PROCEDURE — 97161 PT EVAL LOW COMPLEX 20 MIN: CPT

## 2023-11-13 PROCEDURE — 83880 ASSAY OF NATRIURETIC PEPTIDE: CPT

## 2023-11-13 PROCEDURE — 71045 X-RAY EXAM CHEST 1 VIEW: CPT

## 2023-11-13 PROCEDURE — 97110 THERAPEUTIC EXERCISES: CPT

## 2023-11-13 PROCEDURE — 87205 SMEAR GRAM STAIN: CPT

## 2023-11-13 PROCEDURE — 71275 CT ANGIOGRAPHY CHEST: CPT

## 2023-11-13 PROCEDURE — 86900 BLOOD TYPING SEROLOGIC ABO: CPT

## 2023-11-13 PROCEDURE — 83605 ASSAY OF LACTIC ACID: CPT

## 2023-11-13 PROCEDURE — 94640 AIRWAY INHALATION TREATMENT: CPT

## 2023-11-13 PROCEDURE — 85027 COMPLETE CBC AUTOMATED: CPT

## 2023-11-13 PROCEDURE — 82247 BILIRUBIN TOTAL: CPT

## 2023-11-13 PROCEDURE — 83521 IG LIGHT CHAINS FREE EACH: CPT

## 2023-11-13 PROCEDURE — 84436 ASSAY OF TOTAL THYROXINE: CPT

## 2023-11-13 PROCEDURE — 87077 CULTURE AEROBIC IDENTIFY: CPT

## 2023-11-13 PROCEDURE — 93308 TTE F-UP OR LMTD: CPT

## 2023-11-13 PROCEDURE — 83036 HEMOGLOBIN GLYCOSYLATED A1C: CPT

## 2023-11-13 PROCEDURE — 97530 THERAPEUTIC ACTIVITIES: CPT

## 2023-11-13 PROCEDURE — 82607 VITAMIN B-12: CPT

## 2023-11-13 PROCEDURE — 88275 CYTOGENETICS 100-300: CPT

## 2023-11-13 PROCEDURE — 82565 ASSAY OF CREATININE: CPT

## 2023-11-13 PROCEDURE — C1894: CPT

## 2023-11-13 PROCEDURE — 93320 DOPPLER ECHO COMPLETE: CPT

## 2023-11-13 PROCEDURE — 88264 CHROMOSOME ANALYSIS 20-25: CPT

## 2023-11-13 PROCEDURE — 83735 ASSAY OF MAGNESIUM: CPT

## 2023-11-13 PROCEDURE — 93005 ELECTROCARDIOGRAM TRACING: CPT

## 2023-11-13 PROCEDURE — 36430 TRANSFUSION BLD/BLD COMPNT: CPT

## 2023-11-13 PROCEDURE — 76377 3D RENDER W/INTRP POSTPROCES: CPT

## 2023-11-13 PROCEDURE — 97116 GAIT TRAINING THERAPY: CPT

## 2023-11-13 PROCEDURE — 80048 BASIC METABOLIC PNL TOTAL CA: CPT

## 2023-11-13 PROCEDURE — 99214 OFFICE O/P EST MOD 30 MIN: CPT | Mod: 25

## 2023-11-13 PROCEDURE — 87015 SPECIMEN INFECT AGNT CONCNTJ: CPT

## 2023-11-13 PROCEDURE — 82330 ASSAY OF CALCIUM: CPT

## 2023-11-13 PROCEDURE — 86850 RBC ANTIBODY SCREEN: CPT

## 2023-11-13 PROCEDURE — 87449 NOS EACH ORGANISM AG IA: CPT

## 2023-11-13 PROCEDURE — C1887: CPT

## 2023-11-13 PROCEDURE — 82803 BLOOD GASES ANY COMBINATION: CPT

## 2023-11-13 PROCEDURE — 88237 TISSUE CULTURE BONE MARROW: CPT

## 2023-11-13 PROCEDURE — 83010 ASSAY OF HAPTOGLOBIN QUANT: CPT

## 2023-11-13 PROCEDURE — 87116 MYCOBACTERIA CULTURE: CPT

## 2023-11-13 PROCEDURE — 84443 ASSAY THYROID STIM HORMONE: CPT

## 2023-11-13 PROCEDURE — 87070 CULTURE OTHR SPECIMN AEROBIC: CPT

## 2023-11-13 PROCEDURE — 97162 PT EVAL MOD COMPLEX 30 MIN: CPT

## 2023-11-13 PROCEDURE — 96372 THER/PROPH/DIAG INJ SC/IM: CPT

## 2023-11-13 PROCEDURE — 82272 OCCULT BLD FECES 1-3 TESTS: CPT

## 2023-11-13 PROCEDURE — C1725: CPT

## 2023-11-13 PROCEDURE — 87899 AGENT NOS ASSAY W/OPTIC: CPT

## 2023-11-13 PROCEDURE — 74174 CTA ABD&PLVS W/CONTRAST: CPT

## 2023-11-13 PROCEDURE — C1760: CPT

## 2023-11-13 PROCEDURE — 36415 COLL VENOUS BLD VENIPUNCTURE: CPT

## 2023-11-13 PROCEDURE — 80053 COMPREHEN METABOLIC PANEL: CPT

## 2023-11-13 PROCEDURE — 80076 HEPATIC FUNCTION PANEL: CPT

## 2023-11-13 PROCEDURE — 87640 STAPH A DNA AMP PROBE: CPT

## 2023-11-13 PROCEDURE — 84484 ASSAY OF TROPONIN QUANT: CPT

## 2023-11-13 PROCEDURE — 83615 LACTATE (LD) (LDH) ENZYME: CPT

## 2023-11-13 PROCEDURE — 84439 ASSAY OF FREE THYROXINE: CPT

## 2023-11-13 PROCEDURE — 86923 COMPATIBILITY TEST ELECTRIC: CPT

## 2023-11-13 PROCEDURE — 76000 FLUOROSCOPY <1 HR PHYS/QHP: CPT

## 2023-11-13 PROCEDURE — 93306 TTE W/DOPPLER COMPLETE: CPT

## 2023-11-13 PROCEDURE — 82962 GLUCOSE BLOOD TEST: CPT

## 2023-11-13 PROCEDURE — 99285 EMERGENCY DEPT VISIT HI MDM: CPT

## 2023-11-13 PROCEDURE — 86901 BLOOD TYPING SEROLOGIC RH(D): CPT

## 2023-11-13 PROCEDURE — 93312 ECHO TRANSESOPHAGEAL: CPT

## 2023-11-13 PROCEDURE — P9016: CPT

## 2023-11-13 PROCEDURE — 71250 CT THORAX DX C-: CPT

## 2023-11-13 PROCEDURE — 81270 JAK2 GENE: CPT

## 2023-11-13 PROCEDURE — 87637 SARSCOV2&INF A&B&RSV AMP PRB: CPT

## 2023-11-13 PROCEDURE — 86334 IMMUNOFIX E-PHORESIS SERUM: CPT

## 2023-11-13 PROCEDURE — 85610 PROTHROMBIN TIME: CPT

## 2023-11-13 PROCEDURE — 80202 ASSAY OF VANCOMYCIN: CPT

## 2023-11-13 PROCEDURE — 71250 CT THORAX DX C-: CPT | Mod: MA

## 2023-11-13 PROCEDURE — 74018 RADEX ABDOMEN 1 VIEW: CPT

## 2023-11-13 PROCEDURE — 82947 ASSAY GLUCOSE BLOOD QUANT: CPT

## 2023-11-13 PROCEDURE — C1751: CPT

## 2023-11-13 PROCEDURE — 85652 RBC SED RATE AUTOMATED: CPT

## 2023-11-13 PROCEDURE — 36569 INSJ PICC 5 YR+ W/O IMAGING: CPT

## 2023-11-13 PROCEDURE — 81001 URINALYSIS AUTO W/SCOPE: CPT

## 2023-11-13 PROCEDURE — 87641 MR-STAPH DNA AMP PROBE: CPT

## 2023-11-13 PROCEDURE — 93880 EXTRACRANIAL BILAT STUDY: CPT

## 2023-11-13 PROCEDURE — C1769: CPT

## 2023-11-13 PROCEDURE — 85730 THROMBOPLASTIN TIME PARTIAL: CPT

## 2023-11-13 PROCEDURE — 75574 CT ANGIO HRT W/3D IMAGE: CPT

## 2023-11-13 PROCEDURE — 85014 HEMATOCRIT: CPT

## 2023-11-13 PROCEDURE — 82435 ASSAY OF BLOOD CHLORIDE: CPT

## 2023-11-13 PROCEDURE — 87206 SMEAR FLUORESCENT/ACID STAI: CPT

## 2023-11-13 PROCEDURE — C1753: CPT

## 2023-11-13 PROCEDURE — 88184 FLOWCYTOMETRY/ TC 1 MARKER: CPT

## 2023-11-13 PROCEDURE — 85045 AUTOMATED RETICULOCYTE COUNT: CPT

## 2023-11-13 PROCEDURE — 80061 LIPID PANEL: CPT

## 2023-11-13 PROCEDURE — 85018 HEMOGLOBIN: CPT

## 2023-11-13 PROCEDURE — 88280 CHROMOSOME KARYOTYPE STUDY: CPT

## 2023-11-13 PROCEDURE — 84480 ASSAY TRIIODOTHYRONINE (T3): CPT

## 2023-11-13 PROCEDURE — 93325 DOPPLER ECHO COLOR FLOW MAPG: CPT

## 2023-11-13 PROCEDURE — 81219 CALR GENE COM VARIANTS: CPT

## 2023-11-13 PROCEDURE — C1889: CPT

## 2023-11-13 PROCEDURE — 88285 CHROMOSOME COUNT ADDITIONAL: CPT

## 2023-11-13 PROCEDURE — 83540 ASSAY OF IRON: CPT

## 2023-11-13 PROCEDURE — 86880 COOMBS TEST DIRECT: CPT

## 2023-11-13 PROCEDURE — 0225U NFCT DS DNA&RNA 21 SARSCOV2: CPT

## 2023-11-13 PROCEDURE — 85520 HEPARIN ASSAY: CPT

## 2023-11-13 PROCEDURE — 83550 IRON BINDING TEST: CPT

## 2023-11-13 PROCEDURE — 82248 BILIRUBIN DIRECT: CPT

## 2023-11-13 PROCEDURE — 82746 ASSAY OF FOLIC ACID SERUM: CPT

## 2023-11-13 PROCEDURE — 84295 ASSAY OF SERUM SODIUM: CPT

## 2023-11-13 PROCEDURE — 84145 PROCALCITONIN (PCT): CPT

## 2023-11-13 PROCEDURE — 93321 DOPPLER ECHO F-UP/LMTD STD: CPT

## 2023-11-13 PROCEDURE — 88185 FLOWCYTOMETRY/TC ADD-ON: CPT

## 2023-11-13 PROCEDURE — 88271 CYTOGENETICS DNA PROBE: CPT

## 2023-11-14 ENCOUNTER — APPOINTMENT (OUTPATIENT)
Dept: CARDIOLOGY | Facility: CLINIC | Age: 73
End: 2023-11-14
Payer: MEDICARE

## 2023-11-14 ENCOUNTER — NON-APPOINTMENT (OUTPATIENT)
Age: 73
End: 2023-11-14

## 2023-11-14 PROCEDURE — 99214 OFFICE O/P EST MOD 30 MIN: CPT | Mod: 95

## 2023-11-14 RX ORDER — TEZEPELUMAB-EKKO 210 MG/1.9ML
210 INJECTION, SOLUTION SUBCUTANEOUS
Qty: 0 | Refills: 0 | Status: COMPLETED | OUTPATIENT
Start: 2023-05-15

## 2023-11-15 RX ORDER — GABAPENTIN 100 MG/1
100 CAPSULE ORAL 3 TIMES DAILY
Qty: 270 | Refills: 3 | Status: ACTIVE | COMMUNITY
Start: 1900-01-01 | End: 1900-01-01

## 2023-11-15 RX ORDER — APIXABAN 5 MG/1
5 TABLET, FILM COATED ORAL
Qty: 180 | Refills: 3 | Status: ACTIVE | COMMUNITY
Start: 2023-11-15 | End: 1900-01-01

## 2023-11-16 ENCOUNTER — TRANSCRIPTION ENCOUNTER (OUTPATIENT)
Age: 73
End: 2023-11-16

## 2023-11-16 ENCOUNTER — LABORATORY RESULT (OUTPATIENT)
Age: 73
End: 2023-11-16

## 2023-11-20 RX ORDER — DOXYCYCLINE 100 MG/1
100 TABLET, FILM COATED ORAL TWICE DAILY
Qty: 20 | Refills: 0 | Status: DISCONTINUED | COMMUNITY
Start: 2023-10-13 | End: 2023-11-20

## 2023-11-20 RX ORDER — ROSUVASTATIN CALCIUM 5 MG/1
5 TABLET, FILM COATED ORAL
Qty: 90 | Refills: 3 | Status: ACTIVE | COMMUNITY
Start: 2019-06-28 | End: 1900-01-01

## 2023-11-27 RX ORDER — SYRINGE-NEEDLE,INSULIN,0.5 ML 31 GX5/16"
31G X 5/16" SYRINGE, EMPTY DISPOSABLE MISCELLANEOUS
Qty: 3 | Refills: 3 | Status: DISCONTINUED | COMMUNITY
Start: 2023-06-20 | End: 2023-11-27

## 2023-11-27 RX ORDER — INSULIN GLARGINE 100 [IU]/ML
100 INJECTION, SOLUTION SUBCUTANEOUS AT BEDTIME
Refills: 0 | Status: DISCONTINUED | COMMUNITY
End: 2023-11-27

## 2023-11-29 ENCOUNTER — APPOINTMENT (OUTPATIENT)
Dept: INTERNAL MEDICINE | Facility: CLINIC | Age: 73
End: 2023-11-29
Payer: MEDICARE

## 2023-11-29 ENCOUNTER — OUTPATIENT (OUTPATIENT)
Dept: OUTPATIENT SERVICES | Facility: HOSPITAL | Age: 73
LOS: 1 days | End: 2023-11-29
Payer: MEDICARE

## 2023-11-29 VITALS
SYSTOLIC BLOOD PRESSURE: 130 MMHG | OXYGEN SATURATION: 97 % | HEIGHT: 67 IN | HEART RATE: 93 BPM | BODY MASS INDEX: 24.33 KG/M2 | DIASTOLIC BLOOD PRESSURE: 82 MMHG | WEIGHT: 155 LBS

## 2023-11-29 DIAGNOSIS — Z95.2 PRESENCE OF PROSTHETIC HEART VALVE: Chronic | ICD-10-CM

## 2023-11-29 DIAGNOSIS — Z98.89 OTHER SPECIFIED POSTPROCEDURAL STATES: Chronic | ICD-10-CM

## 2023-11-29 DIAGNOSIS — K62.5 HEMORRHAGE OF ANUS AND RECTUM: Chronic | ICD-10-CM

## 2023-11-29 DIAGNOSIS — R47.89 OTHER SPEECH DISTURBANCES: ICD-10-CM

## 2023-11-29 DIAGNOSIS — R05.8 OTHER SPECIFIED COUGH: ICD-10-CM

## 2023-11-29 DIAGNOSIS — H05.352 EXOSTOSIS OF LEFT ORBIT: Chronic | ICD-10-CM

## 2023-11-29 DIAGNOSIS — I10 ESSENTIAL (PRIMARY) HYPERTENSION: ICD-10-CM

## 2023-11-29 DIAGNOSIS — Z96.653 PRESENCE OF ARTIFICIAL KNEE JOINT, BILATERAL: Chronic | ICD-10-CM

## 2023-11-29 DIAGNOSIS — Z98.890 OTHER SPECIFIED POSTPROCEDURAL STATES: Chronic | ICD-10-CM

## 2023-11-29 DIAGNOSIS — R05.9 COUGH, UNSPECIFIED: ICD-10-CM

## 2023-11-29 DIAGNOSIS — Z87.09 PERSONAL HISTORY OF OTHER DISEASES OF THE RESPIRATORY SYSTEM: Chronic | ICD-10-CM

## 2023-11-29 PROCEDURE — G0463: CPT

## 2023-11-29 PROCEDURE — 99214 OFFICE O/P EST MOD 30 MIN: CPT

## 2023-11-29 RX ORDER — WARFARIN 4 MG/1
4 TABLET ORAL DAILY
Qty: 30 | Refills: 1 | Status: COMPLETED | COMMUNITY
End: 2023-11-29

## 2023-12-12 ENCOUNTER — APPOINTMENT (OUTPATIENT)
Dept: PULMONOLOGY | Facility: CLINIC | Age: 73
End: 2023-12-12
Payer: MEDICARE

## 2023-12-12 VITALS
RESPIRATION RATE: 16 BRPM | HEART RATE: 94 BPM | OXYGEN SATURATION: 98 % | HEIGHT: 67 IN | BODY MASS INDEX: 24.33 KG/M2 | TEMPERATURE: 97.6 F | SYSTOLIC BLOOD PRESSURE: 148 MMHG | DIASTOLIC BLOOD PRESSURE: 72 MMHG | WEIGHT: 155 LBS

## 2023-12-12 DIAGNOSIS — K21.9 GASTRO-ESOPHAGEAL REFLUX DISEASE W/OUT ESOPHAGITIS: ICD-10-CM

## 2023-12-12 PROCEDURE — 95012 NITRIC OXIDE EXP GAS DETER: CPT

## 2023-12-12 PROCEDURE — ZZZZZ: CPT

## 2023-12-12 PROCEDURE — 94010 BREATHING CAPACITY TEST: CPT

## 2023-12-12 PROCEDURE — 96372 THER/PROPH/DIAG INJ SC/IM: CPT

## 2023-12-12 PROCEDURE — 94727 GAS DIL/WSHOT DETER LNG VOL: CPT

## 2023-12-12 PROCEDURE — 94729 DIFFUSING CAPACITY: CPT

## 2023-12-12 PROCEDURE — 99214 OFFICE O/P EST MOD 30 MIN: CPT | Mod: 25

## 2023-12-12 RX ORDER — GLYCOPYRROLATE 1 MG/1
1 TABLET ORAL 3 TIMES DAILY
Qty: 270 | Refills: 1 | Status: ACTIVE | COMMUNITY
Start: 2023-12-12 | End: 1900-01-01

## 2023-12-12 RX ORDER — TEZEPELUMAB-EKKO 210 MG/1.9ML
210 INJECTION, SOLUTION SUBCUTANEOUS
Qty: 0 | Refills: 0 | Status: COMPLETED | OUTPATIENT
Start: 2023-12-12

## 2023-12-12 RX ADMIN — TEZEPELUMAB-EKKO 0 MG/1.91ML: 210 INJECTION, SOLUTION SUBCUTANEOUS at 00:00

## 2023-12-13 ENCOUNTER — LABORATORY RESULT (OUTPATIENT)
Age: 73
End: 2023-12-13

## 2023-12-13 PROCEDURE — 82525 ASSAY OF COPPER: CPT

## 2023-12-13 PROCEDURE — 70450 CT HEAD/BRAIN W/O DYE: CPT | Mod: MA

## 2023-12-13 PROCEDURE — 86140 C-REACTIVE PROTEIN: CPT

## 2023-12-13 PROCEDURE — 86780 TREPONEMA PALLIDUM: CPT

## 2023-12-13 PROCEDURE — C1751: CPT

## 2023-12-13 PROCEDURE — 85610 PROTHROMBIN TIME: CPT

## 2023-12-13 PROCEDURE — A9585: CPT

## 2023-12-13 PROCEDURE — 82962 GLUCOSE BLOOD TEST: CPT

## 2023-12-13 PROCEDURE — 87086 URINE CULTURE/COLONY COUNT: CPT

## 2023-12-13 PROCEDURE — 93005 ELECTROCARDIOGRAM TRACING: CPT

## 2023-12-13 PROCEDURE — 97530 THERAPEUTIC ACTIVITIES: CPT

## 2023-12-13 PROCEDURE — 87637 SARSCOV2&INF A&B&RSV AMP PRB: CPT

## 2023-12-13 PROCEDURE — 97116 GAIT TRAINING THERAPY: CPT

## 2023-12-13 PROCEDURE — 87186 SC STD MICRODIL/AGAR DIL: CPT

## 2023-12-13 PROCEDURE — 84484 ASSAY OF TROPONIN QUANT: CPT

## 2023-12-13 PROCEDURE — 82306 VITAMIN D 25 HYDROXY: CPT

## 2023-12-13 PROCEDURE — 82607 VITAMIN B-12: CPT

## 2023-12-13 PROCEDURE — 71045 X-RAY EXAM CHEST 1 VIEW: CPT

## 2023-12-13 PROCEDURE — 95711 VEEG 2-12 HR UNMONITORED: CPT

## 2023-12-13 PROCEDURE — 36415 COLL VENOUS BLD VENIPUNCTURE: CPT

## 2023-12-13 PROCEDURE — 70553 MRI BRAIN STEM W/O & W/DYE: CPT

## 2023-12-13 PROCEDURE — 83605 ASSAY OF LACTIC ACID: CPT

## 2023-12-13 PROCEDURE — 95700 EEG CONT REC W/VID EEG TECH: CPT

## 2023-12-13 PROCEDURE — 97162 PT EVAL MOD COMPLEX 30 MIN: CPT

## 2023-12-13 PROCEDURE — 84630 ASSAY OF ZINC: CPT

## 2023-12-13 PROCEDURE — 87040 BLOOD CULTURE FOR BACTERIA: CPT

## 2023-12-13 PROCEDURE — 84207 ASSAY OF VITAMIN B-6: CPT

## 2023-12-13 PROCEDURE — 71250 CT THORAX DX C-: CPT

## 2023-12-13 PROCEDURE — 84425 ASSAY OF VITAMIN B-1: CPT

## 2023-12-13 PROCEDURE — 80053 COMPREHEN METABOLIC PANEL: CPT

## 2023-12-13 PROCEDURE — 82746 ASSAY OF FOLIC ACID SERUM: CPT

## 2023-12-13 PROCEDURE — 84295 ASSAY OF SERUM SODIUM: CPT

## 2023-12-13 PROCEDURE — 85025 COMPLETE CBC W/AUTO DIFF WBC: CPT

## 2023-12-13 PROCEDURE — 85730 THROMBOPLASTIN TIME PARTIAL: CPT

## 2023-12-13 PROCEDURE — 85652 RBC SED RATE AUTOMATED: CPT

## 2023-12-13 PROCEDURE — 83735 ASSAY OF MAGNESIUM: CPT

## 2023-12-13 PROCEDURE — 87070 CULTURE OTHR SPECIMN AEROBIC: CPT

## 2023-12-13 PROCEDURE — 94640 AIRWAY INHALATION TREATMENT: CPT

## 2023-12-13 PROCEDURE — 36569 INSJ PICC 5 YR+ W/O IMAGING: CPT

## 2023-12-13 PROCEDURE — 95714 VEEG EA 12-26 HR UNMNTR: CPT

## 2023-12-13 PROCEDURE — 99285 EMERGENCY DEPT VISIT HI MDM: CPT

## 2023-12-13 PROCEDURE — 85014 HEMATOCRIT: CPT

## 2023-12-13 PROCEDURE — 85018 HEMOGLOBIN: CPT

## 2023-12-13 PROCEDURE — 82330 ASSAY OF CALCIUM: CPT

## 2023-12-13 PROCEDURE — 87077 CULTURE AEROBIC IDENTIFY: CPT

## 2023-12-13 PROCEDURE — 85027 COMPLETE CBC AUTOMATED: CPT

## 2023-12-13 PROCEDURE — 70551 MRI BRAIN STEM W/O DYE: CPT

## 2023-12-13 PROCEDURE — 82140 ASSAY OF AMMONIA: CPT

## 2023-12-13 PROCEDURE — 81003 URINALYSIS AUTO W/O SCOPE: CPT

## 2023-12-13 PROCEDURE — 82435 ASSAY OF BLOOD CHLORIDE: CPT

## 2023-12-13 PROCEDURE — 82803 BLOOD GASES ANY COMBINATION: CPT

## 2023-12-13 PROCEDURE — 84132 ASSAY OF SERUM POTASSIUM: CPT

## 2023-12-13 PROCEDURE — 84100 ASSAY OF PHOSPHORUS: CPT

## 2023-12-13 PROCEDURE — 82947 ASSAY GLUCOSE BLOOD QUANT: CPT

## 2023-12-13 PROCEDURE — 80048 BASIC METABOLIC PNL TOTAL CA: CPT

## 2023-12-19 ENCOUNTER — OUTPATIENT (OUTPATIENT)
Dept: OUTPATIENT SERVICES | Facility: HOSPITAL | Age: 73
LOS: 1 days | Discharge: HOME | End: 2023-12-19

## 2023-12-19 DIAGNOSIS — C18.9 MALIGNANT NEOPLASM OF COLON, UNSPECIFIED: ICD-10-CM

## 2023-12-19 DIAGNOSIS — Z87.09 PERSONAL HISTORY OF OTHER DISEASES OF THE RESPIRATORY SYSTEM: Chronic | ICD-10-CM

## 2023-12-19 DIAGNOSIS — Z95.2 PRESENCE OF PROSTHETIC HEART VALVE: Chronic | ICD-10-CM

## 2023-12-19 DIAGNOSIS — Z98.89 OTHER SPECIFIED POSTPROCEDURAL STATES: Chronic | ICD-10-CM

## 2023-12-19 DIAGNOSIS — Z96.653 PRESENCE OF ARTIFICIAL KNEE JOINT, BILATERAL: Chronic | ICD-10-CM

## 2023-12-19 DIAGNOSIS — Z98.890 OTHER SPECIFIED POSTPROCEDURAL STATES: Chronic | ICD-10-CM

## 2023-12-19 DIAGNOSIS — H05.352 EXOSTOSIS OF LEFT ORBIT: Chronic | ICD-10-CM

## 2023-12-19 DIAGNOSIS — K62.5 HEMORRHAGE OF ANUS AND RECTUM: Chronic | ICD-10-CM

## 2023-12-21 ENCOUNTER — INPATIENT (INPATIENT)
Facility: HOSPITAL | Age: 73
LOS: 6 days | Discharge: NURSING, REHAB OR LONG-TERM CARE | DRG: 871 | End: 2023-12-28
Attending: STUDENT IN AN ORGANIZED HEALTH CARE EDUCATION/TRAINING PROGRAM | Admitting: STUDENT IN AN ORGANIZED HEALTH CARE EDUCATION/TRAINING PROGRAM
Payer: MEDICARE

## 2023-12-21 VITALS
TEMPERATURE: 102 F | OXYGEN SATURATION: 96 % | RESPIRATION RATE: 18 BRPM | HEART RATE: 88 BPM | DIASTOLIC BLOOD PRESSURE: 59 MMHG | SYSTOLIC BLOOD PRESSURE: 139 MMHG | HEIGHT: 67 IN

## 2023-12-21 DIAGNOSIS — Z95.2 PRESENCE OF PROSTHETIC HEART VALVE: Chronic | ICD-10-CM

## 2023-12-21 DIAGNOSIS — H05.352 EXOSTOSIS OF LEFT ORBIT: Chronic | ICD-10-CM

## 2023-12-21 DIAGNOSIS — Z98.890 OTHER SPECIFIED POSTPROCEDURAL STATES: Chronic | ICD-10-CM

## 2023-12-21 DIAGNOSIS — Z98.89 OTHER SPECIFIED POSTPROCEDURAL STATES: Chronic | ICD-10-CM

## 2023-12-21 DIAGNOSIS — Z96.653 PRESENCE OF ARTIFICIAL KNEE JOINT, BILATERAL: Chronic | ICD-10-CM

## 2023-12-21 DIAGNOSIS — Z87.09 PERSONAL HISTORY OF OTHER DISEASES OF THE RESPIRATORY SYSTEM: Chronic | ICD-10-CM

## 2023-12-21 DIAGNOSIS — J18.9 PNEUMONIA, UNSPECIFIED ORGANISM: ICD-10-CM

## 2023-12-21 DIAGNOSIS — K62.5 HEMORRHAGE OF ANUS AND RECTUM: Chronic | ICD-10-CM

## 2023-12-21 LAB
ALBUMIN SERPL ELPH-MCNC: 3.3 G/DL — SIGNIFICANT CHANGE UP (ref 3.3–5.2)
ALBUMIN SERPL ELPH-MCNC: 3.3 G/DL — SIGNIFICANT CHANGE UP (ref 3.3–5.2)
ALP SERPL-CCNC: 93 U/L — SIGNIFICANT CHANGE UP (ref 40–120)
ALP SERPL-CCNC: 93 U/L — SIGNIFICANT CHANGE UP (ref 40–120)
ALT FLD-CCNC: 10 U/L — SIGNIFICANT CHANGE UP
ALT FLD-CCNC: 10 U/L — SIGNIFICANT CHANGE UP
ANION GAP SERPL CALC-SCNC: 16 MMOL/L — SIGNIFICANT CHANGE UP (ref 5–17)
ANION GAP SERPL CALC-SCNC: 16 MMOL/L — SIGNIFICANT CHANGE UP (ref 5–17)
ANISOCYTOSIS BLD QL: SIGNIFICANT CHANGE UP
ANISOCYTOSIS BLD QL: SIGNIFICANT CHANGE UP
APTT BLD: 36.7 SEC — HIGH (ref 24.5–35.6)
APTT BLD: 36.7 SEC — HIGH (ref 24.5–35.6)
AST SERPL-CCNC: 31 U/L — SIGNIFICANT CHANGE UP
AST SERPL-CCNC: 31 U/L — SIGNIFICANT CHANGE UP
BASOPHILS # BLD AUTO: 0.1 K/UL — SIGNIFICANT CHANGE UP (ref 0–0.2)
BASOPHILS # BLD AUTO: 0.1 K/UL — SIGNIFICANT CHANGE UP (ref 0–0.2)
BASOPHILS NFR BLD AUTO: 0.4 % — SIGNIFICANT CHANGE UP (ref 0–2)
BASOPHILS NFR BLD AUTO: 0.4 % — SIGNIFICANT CHANGE UP (ref 0–2)
BILIRUB SERPL-MCNC: 0.4 MG/DL — SIGNIFICANT CHANGE UP (ref 0.4–2)
BILIRUB SERPL-MCNC: 0.4 MG/DL — SIGNIFICANT CHANGE UP (ref 0.4–2)
BUN SERPL-MCNC: 19.6 MG/DL — SIGNIFICANT CHANGE UP (ref 8–20)
BUN SERPL-MCNC: 19.6 MG/DL — SIGNIFICANT CHANGE UP (ref 8–20)
BURR CELLS BLD QL SMEAR: PRESENT — SIGNIFICANT CHANGE UP
BURR CELLS BLD QL SMEAR: PRESENT — SIGNIFICANT CHANGE UP
CALCIUM SERPL-MCNC: 8.8 MG/DL — SIGNIFICANT CHANGE UP (ref 8.4–10.5)
CALCIUM SERPL-MCNC: 8.8 MG/DL — SIGNIFICANT CHANGE UP (ref 8.4–10.5)
CHLORIDE SERPL-SCNC: 97 MMOL/L — SIGNIFICANT CHANGE UP (ref 96–108)
CHLORIDE SERPL-SCNC: 97 MMOL/L — SIGNIFICANT CHANGE UP (ref 96–108)
CO2 SERPL-SCNC: 26 MMOL/L — SIGNIFICANT CHANGE UP (ref 22–29)
CO2 SERPL-SCNC: 26 MMOL/L — SIGNIFICANT CHANGE UP (ref 22–29)
CREAT SERPL-MCNC: 0.94 MG/DL — SIGNIFICANT CHANGE UP (ref 0.5–1.3)
CREAT SERPL-MCNC: 0.94 MG/DL — SIGNIFICANT CHANGE UP (ref 0.5–1.3)
EGFR: 63 ML/MIN/1.73M2 — SIGNIFICANT CHANGE UP
EGFR: 63 ML/MIN/1.73M2 — SIGNIFICANT CHANGE UP
EOSINOPHIL # BLD AUTO: 0.04 K/UL — SIGNIFICANT CHANGE UP (ref 0–0.5)
EOSINOPHIL # BLD AUTO: 0.04 K/UL — SIGNIFICANT CHANGE UP (ref 0–0.5)
EOSINOPHIL NFR BLD AUTO: 0.2 % — SIGNIFICANT CHANGE UP (ref 0–6)
EOSINOPHIL NFR BLD AUTO: 0.2 % — SIGNIFICANT CHANGE UP (ref 0–6)
FLUAV AG NPH QL: SIGNIFICANT CHANGE UP
FLUAV AG NPH QL: SIGNIFICANT CHANGE UP
FLUBV AG NPH QL: SIGNIFICANT CHANGE UP
FLUBV AG NPH QL: SIGNIFICANT CHANGE UP
GLUCOSE BLDC GLUCOMTR-MCNC: 293 MG/DL — HIGH (ref 70–99)
GLUCOSE BLDC GLUCOMTR-MCNC: 293 MG/DL — HIGH (ref 70–99)
GLUCOSE SERPL-MCNC: 328 MG/DL — HIGH (ref 70–99)
GLUCOSE SERPL-MCNC: 328 MG/DL — HIGH (ref 70–99)
HCT VFR BLD CALC: 39.6 % — SIGNIFICANT CHANGE UP (ref 34.5–45)
HCT VFR BLD CALC: 39.6 % — SIGNIFICANT CHANGE UP (ref 34.5–45)
HGB BLD-MCNC: 11.8 G/DL — SIGNIFICANT CHANGE UP (ref 11.5–15.5)
HGB BLD-MCNC: 11.8 G/DL — SIGNIFICANT CHANGE UP (ref 11.5–15.5)
HYPOCHROMIA BLD QL: SLIGHT — SIGNIFICANT CHANGE UP
HYPOCHROMIA BLD QL: SLIGHT — SIGNIFICANT CHANGE UP
IMM GRANULOCYTES NFR BLD AUTO: 1 % — HIGH (ref 0–0.9)
IMM GRANULOCYTES NFR BLD AUTO: 1 % — HIGH (ref 0–0.9)
INR BLD: 1.4 RATIO — HIGH (ref 0.85–1.18)
INR BLD: 1.4 RATIO — HIGH (ref 0.85–1.18)
LACTATE BLDV-MCNC: 3.7 MMOL/L — HIGH (ref 0.5–2)
LACTATE BLDV-MCNC: 3.7 MMOL/L — HIGH (ref 0.5–2)
LYMPHOCYTES # BLD AUTO: 11.1 % — LOW (ref 13–44)
LYMPHOCYTES # BLD AUTO: 11.1 % — LOW (ref 13–44)
LYMPHOCYTES # BLD AUTO: 2.74 K/UL — SIGNIFICANT CHANGE UP (ref 1–3.3)
LYMPHOCYTES # BLD AUTO: 2.74 K/UL — SIGNIFICANT CHANGE UP (ref 1–3.3)
MACROCYTES BLD QL: SLIGHT — SIGNIFICANT CHANGE UP
MACROCYTES BLD QL: SLIGHT — SIGNIFICANT CHANGE UP
MANUAL SMEAR VERIFICATION: SIGNIFICANT CHANGE UP
MANUAL SMEAR VERIFICATION: SIGNIFICANT CHANGE UP
MCHC RBC-ENTMCNC: 19.6 PG — LOW (ref 27–34)
MCHC RBC-ENTMCNC: 19.6 PG — LOW (ref 27–34)
MCHC RBC-ENTMCNC: 29.8 GM/DL — LOW (ref 32–36)
MCHC RBC-ENTMCNC: 29.8 GM/DL — LOW (ref 32–36)
MCV RBC AUTO: 65.9 FL — LOW (ref 80–100)
MCV RBC AUTO: 65.9 FL — LOW (ref 80–100)
MICROCYTES BLD QL: SLIGHT — SIGNIFICANT CHANGE UP
MICROCYTES BLD QL: SLIGHT — SIGNIFICANT CHANGE UP
MONOCYTES # BLD AUTO: 1.91 K/UL — HIGH (ref 0–0.9)
MONOCYTES # BLD AUTO: 1.91 K/UL — HIGH (ref 0–0.9)
MONOCYTES NFR BLD AUTO: 7.7 % — SIGNIFICANT CHANGE UP (ref 2–14)
MONOCYTES NFR BLD AUTO: 7.7 % — SIGNIFICANT CHANGE UP (ref 2–14)
NEUTROPHILS # BLD AUTO: 19.72 K/UL — HIGH (ref 1.8–7.4)
NEUTROPHILS # BLD AUTO: 19.72 K/UL — HIGH (ref 1.8–7.4)
NEUTROPHILS NFR BLD AUTO: 79.6 % — HIGH (ref 43–77)
NEUTROPHILS NFR BLD AUTO: 79.6 % — HIGH (ref 43–77)
PLAT MORPH BLD: NORMAL — SIGNIFICANT CHANGE UP
PLAT MORPH BLD: NORMAL — SIGNIFICANT CHANGE UP
PLATELET # BLD AUTO: 347 K/UL — SIGNIFICANT CHANGE UP (ref 150–400)
PLATELET # BLD AUTO: 347 K/UL — SIGNIFICANT CHANGE UP (ref 150–400)
POIKILOCYTOSIS BLD QL AUTO: SIGNIFICANT CHANGE UP
POIKILOCYTOSIS BLD QL AUTO: SIGNIFICANT CHANGE UP
POLYCHROMASIA BLD QL SMEAR: SLIGHT — SIGNIFICANT CHANGE UP
POLYCHROMASIA BLD QL SMEAR: SLIGHT — SIGNIFICANT CHANGE UP
POTASSIUM SERPL-MCNC: 5 MMOL/L — SIGNIFICANT CHANGE UP (ref 3.5–5.3)
POTASSIUM SERPL-MCNC: 5 MMOL/L — SIGNIFICANT CHANGE UP (ref 3.5–5.3)
POTASSIUM SERPL-SCNC: 5 MMOL/L — SIGNIFICANT CHANGE UP (ref 3.5–5.3)
POTASSIUM SERPL-SCNC: 5 MMOL/L — SIGNIFICANT CHANGE UP (ref 3.5–5.3)
PROT SERPL-MCNC: 6.9 G/DL — SIGNIFICANT CHANGE UP (ref 6.6–8.7)
PROT SERPL-MCNC: 6.9 G/DL — SIGNIFICANT CHANGE UP (ref 6.6–8.7)
PROTHROM AB SERPL-ACNC: 15.4 SEC — HIGH (ref 9.5–13)
PROTHROM AB SERPL-ACNC: 15.4 SEC — HIGH (ref 9.5–13)
RAPID RVP RESULT: SIGNIFICANT CHANGE UP
RAPID RVP RESULT: SIGNIFICANT CHANGE UP
RBC # BLD: 6.01 M/UL — HIGH (ref 3.8–5.2)
RBC # BLD: 6.01 M/UL — HIGH (ref 3.8–5.2)
RBC # FLD: 23.3 % — HIGH (ref 10.3–14.5)
RBC # FLD: 23.3 % — HIGH (ref 10.3–14.5)
RBC BLD AUTO: SIGNIFICANT CHANGE UP
RBC BLD AUTO: SIGNIFICANT CHANGE UP
RSV RNA NPH QL NAA+NON-PROBE: SIGNIFICANT CHANGE UP
RSV RNA NPH QL NAA+NON-PROBE: SIGNIFICANT CHANGE UP
SARS-COV-2 RNA SPEC QL NAA+PROBE: SIGNIFICANT CHANGE UP
SODIUM SERPL-SCNC: 139 MMOL/L — SIGNIFICANT CHANGE UP (ref 135–145)
SODIUM SERPL-SCNC: 139 MMOL/L — SIGNIFICANT CHANGE UP (ref 135–145)
TARGETS BLD QL SMEAR: SLIGHT — SIGNIFICANT CHANGE UP
TARGETS BLD QL SMEAR: SLIGHT — SIGNIFICANT CHANGE UP
WBC # BLD: 24.77 K/UL — HIGH (ref 3.8–10.5)
WBC # BLD: 24.77 K/UL — HIGH (ref 3.8–10.5)
WBC # FLD AUTO: 24.77 K/UL — HIGH (ref 3.8–10.5)
WBC # FLD AUTO: 24.77 K/UL — HIGH (ref 3.8–10.5)

## 2023-12-21 PROCEDURE — 36000 PLACE NEEDLE IN VEIN: CPT

## 2023-12-21 PROCEDURE — 71250 CT THORAX DX C-: CPT | Mod: 26,MA

## 2023-12-21 PROCEDURE — 99285 EMERGENCY DEPT VISIT HI MDM: CPT | Mod: 25

## 2023-12-21 PROCEDURE — 71045 X-RAY EXAM CHEST 1 VIEW: CPT | Mod: 26

## 2023-12-21 PROCEDURE — 74176 CT ABD & PELVIS W/O CONTRAST: CPT | Mod: 26,MA

## 2023-12-21 PROCEDURE — 99223 1ST HOSP IP/OBS HIGH 75: CPT

## 2023-12-21 RX ORDER — DIPHENHYDRAMINE HCL 50 MG
50 CAPSULE ORAL DAILY
Refills: 0 | Status: DISCONTINUED | OUTPATIENT
Start: 2023-12-21 | End: 2023-12-28

## 2023-12-21 RX ORDER — ACETAMINOPHEN 500 MG
1000 TABLET ORAL ONCE
Refills: 0 | Status: COMPLETED | OUTPATIENT
Start: 2023-12-21 | End: 2023-12-21

## 2023-12-21 RX ORDER — INSULIN GLARGINE 100 [IU]/ML
16 INJECTION, SOLUTION SUBCUTANEOUS AT BEDTIME
Refills: 0 | Status: DISCONTINUED | OUTPATIENT
Start: 2023-12-21 | End: 2023-12-28

## 2023-12-21 RX ORDER — APIXABAN 2.5 MG/1
1 TABLET, FILM COATED ORAL
Refills: 0 | DISCHARGE

## 2023-12-21 RX ORDER — SODIUM CHLORIDE 9 MG/ML
1000 INJECTION INTRAMUSCULAR; INTRAVENOUS; SUBCUTANEOUS
Refills: 0 | Status: DISCONTINUED | OUTPATIENT
Start: 2023-12-21 | End: 2023-12-23

## 2023-12-21 RX ORDER — IPRATROPIUM/ALBUTEROL SULFATE 18-103MCG
3 AEROSOL WITH ADAPTER (GRAM) INHALATION EVERY 6 HOURS
Refills: 0 | Status: DISCONTINUED | OUTPATIENT
Start: 2023-12-21 | End: 2023-12-28

## 2023-12-21 RX ORDER — DEXTROSE 50 % IN WATER 50 %
25 SYRINGE (ML) INTRAVENOUS ONCE
Refills: 0 | Status: DISCONTINUED | OUTPATIENT
Start: 2023-12-21 | End: 2023-12-28

## 2023-12-21 RX ORDER — AZTREONAM 2 G
VIAL (EA) INJECTION
Refills: 0 | Status: COMPLETED | OUTPATIENT
Start: 2023-12-21 | End: 2023-12-21

## 2023-12-21 RX ORDER — INSULIN LISPRO 100/ML
5 VIAL (ML) SUBCUTANEOUS
Refills: 0 | Status: DISCONTINUED | OUTPATIENT
Start: 2023-12-21 | End: 2023-12-28

## 2023-12-21 RX ORDER — BUDESONIDE, MICRONIZED 100 %
2 POWDER (GRAM) MISCELLANEOUS
Refills: 0 | DISCHARGE

## 2023-12-21 RX ORDER — WARFARIN SODIUM 2.5 MG/1
0 TABLET ORAL
Refills: 0 | DISCHARGE

## 2023-12-21 RX ORDER — INSULIN LISPRO 100/ML
VIAL (ML) SUBCUTANEOUS
Refills: 0 | Status: DISCONTINUED | OUTPATIENT
Start: 2023-12-21 | End: 2023-12-28

## 2023-12-21 RX ORDER — GLUCAGON INJECTION, SOLUTION 0.5 MG/.1ML
1 INJECTION, SOLUTION SUBCUTANEOUS ONCE
Refills: 0 | Status: DISCONTINUED | OUTPATIENT
Start: 2023-12-21 | End: 2023-12-28

## 2023-12-21 RX ORDER — SODIUM CHLORIDE 9 MG/ML
1000 INJECTION, SOLUTION INTRAVENOUS
Refills: 0 | Status: DISCONTINUED | OUTPATIENT
Start: 2023-12-21 | End: 2023-12-28

## 2023-12-21 RX ORDER — VANCOMYCIN HCL 1 G
1000 VIAL (EA) INTRAVENOUS ONCE
Refills: 0 | Status: COMPLETED | OUTPATIENT
Start: 2023-12-21 | End: 2023-12-21

## 2023-12-21 RX ORDER — MONTELUKAST 4 MG/1
1 TABLET, CHEWABLE ORAL
Refills: 0 | DISCHARGE

## 2023-12-21 RX ORDER — LEVETIRACETAM 250 MG/1
500 TABLET, FILM COATED ORAL
Refills: 0 | Status: DISCONTINUED | OUTPATIENT
Start: 2023-12-21 | End: 2023-12-28

## 2023-12-21 RX ORDER — DEXTROSE 50 % IN WATER 50 %
15 SYRINGE (ML) INTRAVENOUS ONCE
Refills: 0 | Status: DISCONTINUED | OUTPATIENT
Start: 2023-12-21 | End: 2023-12-28

## 2023-12-21 RX ORDER — FAMOTIDINE 10 MG/ML
1 INJECTION INTRAVENOUS
Refills: 0 | DISCHARGE

## 2023-12-21 RX ORDER — ERTAPENEM SODIUM 1 G/1
1000 INJECTION, POWDER, LYOPHILIZED, FOR SOLUTION INTRAMUSCULAR; INTRAVENOUS EVERY 24 HOURS
Refills: 0 | Status: COMPLETED | OUTPATIENT
Start: 2023-12-22 | End: 2023-12-28

## 2023-12-21 RX ORDER — APIXABAN 2.5 MG/1
5 TABLET, FILM COATED ORAL EVERY 12 HOURS
Refills: 0 | Status: DISCONTINUED | OUTPATIENT
Start: 2023-12-21 | End: 2023-12-28

## 2023-12-21 RX ORDER — GABAPENTIN 400 MG/1
100 CAPSULE ORAL EVERY 8 HOURS
Refills: 0 | Status: DISCONTINUED | OUTPATIENT
Start: 2023-12-21 | End: 2023-12-28

## 2023-12-21 RX ORDER — AZTREONAM 2 G
2000 VIAL (EA) INJECTION EVERY 8 HOURS
Refills: 0 | Status: COMPLETED | OUTPATIENT
Start: 2023-12-21 | End: 2023-12-21

## 2023-12-21 RX ORDER — TIOTROPIUM BROMIDE 18 UG/1
2 CAPSULE ORAL; RESPIRATORY (INHALATION) DAILY
Refills: 0 | Status: DISCONTINUED | OUTPATIENT
Start: 2023-12-21 | End: 2023-12-28

## 2023-12-21 RX ORDER — SCOPALAMINE 1 MG/3D
1 PATCH, EXTENDED RELEASE TRANSDERMAL
Refills: 0 | DISCHARGE

## 2023-12-21 RX ORDER — FAMOTIDINE 10 MG/ML
20 INJECTION INTRAVENOUS DAILY
Refills: 0 | Status: DISCONTINUED | OUTPATIENT
Start: 2023-12-21 | End: 2023-12-28

## 2023-12-21 RX ORDER — ONDANSETRON 8 MG/1
4 TABLET, FILM COATED ORAL EVERY 6 HOURS
Refills: 0 | Status: DISCONTINUED | OUTPATIENT
Start: 2023-12-21 | End: 2023-12-28

## 2023-12-21 RX ORDER — ONDANSETRON 8 MG/1
1 TABLET, FILM COATED ORAL
Refills: 0 | DISCHARGE

## 2023-12-21 RX ORDER — ONDANSETRON 8 MG/1
8 TABLET, FILM COATED ORAL ONCE
Refills: 0 | Status: COMPLETED | OUTPATIENT
Start: 2023-12-21 | End: 2023-12-21

## 2023-12-21 RX ORDER — METRONIDAZOLE 500 MG
500 TABLET ORAL ONCE
Refills: 0 | Status: COMPLETED | OUTPATIENT
Start: 2023-12-21 | End: 2023-12-21

## 2023-12-21 RX ORDER — MONTELUKAST 4 MG/1
10 TABLET, CHEWABLE ORAL DAILY
Refills: 0 | Status: DISCONTINUED | OUTPATIENT
Start: 2023-12-21 | End: 2023-12-28

## 2023-12-21 RX ORDER — ATORVASTATIN CALCIUM 80 MG/1
20 TABLET, FILM COATED ORAL AT BEDTIME
Refills: 0 | Status: DISCONTINUED | OUTPATIENT
Start: 2023-12-21 | End: 2023-12-28

## 2023-12-21 RX ORDER — DEXTROSE 50 % IN WATER 50 %
12.5 SYRINGE (ML) INTRAVENOUS ONCE
Refills: 0 | Status: DISCONTINUED | OUTPATIENT
Start: 2023-12-21 | End: 2023-12-28

## 2023-12-21 RX ORDER — CLOPIDOGREL BISULFATE 75 MG/1
75 TABLET, FILM COATED ORAL DAILY
Refills: 0 | Status: DISCONTINUED | OUTPATIENT
Start: 2023-12-21 | End: 2023-12-28

## 2023-12-21 RX ORDER — SODIUM CHLORIDE 9 MG/ML
1900 INJECTION, SOLUTION INTRAVENOUS ONCE
Refills: 0 | Status: COMPLETED | OUTPATIENT
Start: 2023-12-21 | End: 2023-12-21

## 2023-12-21 RX ORDER — MEXILETINE HYDROCHLORIDE 150 MG/1
200 CAPSULE ORAL EVERY 8 HOURS
Refills: 0 | Status: DISCONTINUED | OUTPATIENT
Start: 2023-12-21 | End: 2023-12-28

## 2023-12-21 RX ORDER — METRONIDAZOLE 500 MG
500 TABLET ORAL EVERY 8 HOURS
Refills: 0 | Status: COMPLETED | OUTPATIENT
Start: 2023-12-21 | End: 2023-12-21

## 2023-12-21 RX ORDER — METRONIDAZOLE 500 MG
TABLET ORAL
Refills: 0 | Status: COMPLETED | OUTPATIENT
Start: 2023-12-21 | End: 2023-12-21

## 2023-12-21 RX ORDER — AZTREONAM 2 G
2000 VIAL (EA) INJECTION ONCE
Refills: 0 | Status: COMPLETED | OUTPATIENT
Start: 2023-12-21 | End: 2023-12-21

## 2023-12-21 RX ADMIN — MEXILETINE HYDROCHLORIDE 200 MILLIGRAM(S): 150 CAPSULE ORAL at 21:31

## 2023-12-21 RX ADMIN — GABAPENTIN 100 MILLIGRAM(S): 400 CAPSULE ORAL at 21:36

## 2023-12-21 RX ADMIN — INSULIN GLARGINE 16 UNIT(S): 100 INJECTION, SOLUTION SUBCUTANEOUS at 21:30

## 2023-12-21 RX ADMIN — APIXABAN 5 MILLIGRAM(S): 2.5 TABLET, FILM COATED ORAL at 18:04

## 2023-12-21 RX ADMIN — LEVETIRACETAM 500 MILLIGRAM(S): 250 TABLET, FILM COATED ORAL at 18:04

## 2023-12-21 RX ADMIN — Medication 400 MILLIGRAM(S): at 13:34

## 2023-12-21 RX ADMIN — Medication 1200 MILLIGRAM(S): at 18:03

## 2023-12-21 RX ADMIN — Medication 250 MILLIGRAM(S): at 12:14

## 2023-12-21 RX ADMIN — ONDANSETRON 108 MILLIGRAM(S): 8 TABLET, FILM COATED ORAL at 11:53

## 2023-12-21 RX ADMIN — Medication 1000 MILLIGRAM(S): at 14:04

## 2023-12-21 RX ADMIN — SODIUM CHLORIDE 1900 MILLILITER(S): 9 INJECTION, SOLUTION INTRAVENOUS at 11:52

## 2023-12-21 RX ADMIN — Medication 100 MILLIGRAM(S): at 13:34

## 2023-12-21 RX ADMIN — Medication 100 MILLIGRAM(S): at 22:18

## 2023-12-21 RX ADMIN — Medication 50 MILLIGRAM(S): at 18:03

## 2023-12-21 RX ADMIN — Medication 100 MILLIGRAM(S): at 12:15

## 2023-12-21 RX ADMIN — Medication 100 MILLIGRAM(S): at 21:36

## 2023-12-21 RX ADMIN — SODIUM CHLORIDE 100 MILLILITER(S): 9 INJECTION INTRAMUSCULAR; INTRAVENOUS; SUBCUTANEOUS at 18:05

## 2023-12-21 RX ADMIN — ATORVASTATIN CALCIUM 20 MILLIGRAM(S): 80 TABLET, FILM COATED ORAL at 21:31

## 2023-12-21 NOTE — ED PROVIDER NOTE - OBJECTIVE STATEMENT
A 73-year-old female patient with a history of A-fib, COPD, TAVR,  tracheomalacia, aortic dissection replacement, ostomy secondary to colorectal cancer, adrenal insufficiency, on steroids, presents to the ED complaining of nausea, vomiting.  Patient states that for the past 2 days she has had decreased appetite, woke up this morning around 5 AM and had an episode of nonbloody nonbilious vomiting.  Since then the patient has been unable to tolerate p.o. with 3 more episodes of nausea and vomiting.  Daughter took her temperature at home, states that it was 103.0.  patient denies any chest pain or shortness of breath, abd pain, urinary symptoms.  patient was recently admitted to [place] for pneumonia, was discharged with a PICC line for ertapenem.  Patient noted to be febrile in the ED.  No further complaints at this time A 73-year-old female patient with a history of A-fib, COPD, TAVR,  tracheomalacia, aortic dissection replacement, ostomy secondary to colorectal cancer, adrenal insufficiency, on steroids, presents to the ED complaining of nausea, vomiting.  Patient states that for the past 2 days she has had decreased appetite, woke up this morning around 5 AM and had an episode of nonbloody nonbilious vomiting.  Since then the patient has been unable to tolerate p.o. with 3 more episodes of nausea and vomiting.  Daughter took her temperature at home, states that it was 103.0.  patient denies any chest pain or shortness of breath, abd pain, urinary symptoms.  patient was recently admitted to [place] for pneumonia, was discharged with a PICC line for ertapenem.  Patient noted to be febrile in the ED.  No further complaints at this time

## 2023-12-21 NOTE — ED PROVIDER NOTE - NSICDXPASTSURGICALHX_GEN_ALL_CORE_FT
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    H/O pelvic surgery 5 years ago - s/p fracture    H/O total knee replacement, bilateral 5 years ago    History of partial hysterectomy 30 years ago - fibroids    History of sinus surgery multiple sinus surgeries    History of tracheomalacia 2015 - attempted tracheal stenting ([facility])- course complicated by obstruction, respiratory failure, multiple CPR attempts -  stent discontinued; 10/20/2016 Tracheobronchoplasty (Prolene Mesh) performed at [facility] by Dr Zapien    Rectal bleeding exam under anesthesia ([facility]) 2/2018    S/P aortic valve replacement     S/P bronchoscopy 6/5/2018 - [place] (Dr Zapien) no evidence of tracheobronchomalacia in trachea or bronchial tubes    S/P TAVR (transcatheter aortic valve replacement)      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    H/O pelvic surgery 5 years ago - s/p fracture    H/O total knee replacement, bilateral 5 years ago    History of partial hysterectomy 30 years ago - fibroids    History of sinus surgery multiple sinus surgeries    History of tracheomalacia 2015 - attempted tracheal stenting ([facility])- course complicated by obstruction, respiratory failure, multiple CPR attempts -  stent discontinued; 10/20/2016 Tracheobronchoplasty (Prolene Mesh) performed at [facility] by Dr Zapien    Rectal bleeding exam under anesthesia ([facility]) 2/2018    S/P aortic valve replacement     S/P bronchoscopy 6/5/2018 - [place] (Dr Zapien) no evidence of tracheobronchomalacia in trachea or bronchial tubes    S/P TAVR (transcatheter aortic valve replacement)

## 2023-12-21 NOTE — ED ADULT NURSE NOTE - OBJECTIVE STATEMENT
Pt care acquired at 1200. Pt is A&Ox4 and is resting in bed. Pt arrives to ED c/o nausea for three days. Pt states she had 102 fever. Pt has nausea on assessment. Pt is nsr on the cardiac monitor. Pt denies chest pain. Pt has red ostomy on assessment. Pt has not been feeding well. VSS. Pt is breathing unlabored on room air.

## 2023-12-21 NOTE — ED ADULT NURSE NOTE - NSFALLRISKINTERV_ED_ALL_ED
Assistance OOB with selected safe patient handling equipment if applicable/Assistance with ambulation/Communicate fall risk and risk factors to all staff, patient, and family/Orthostatic vital signs/Provide visual cue: yellow wristband, yellow gown, etc/Reinforce activity limits and safety measures with patient and family/Call bell, personal items and telephone in reach/Instruct patient to call for assistance before getting out of bed/chair/stretcher/Non-slip footwear applied when patient is off stretcher/[place] to call system/Physically safe environment - no spills, clutter or unnecessary equipment/Purposeful Proactive Rounding/Room/bathroom lighting operational, light cord in reach Assistance OOB with selected safe patient handling equipment if applicable/Assistance with ambulation/Communicate fall risk and risk factors to all staff, patient, and family/Orthostatic vital signs/Provide visual cue: yellow wristband, yellow gown, etc/Reinforce activity limits and safety measures with patient and family/Call bell, personal items and telephone in reach/Instruct patient to call for assistance before getting out of bed/chair/stretcher/Non-slip footwear applied when patient is off stretcher/[place] to call system/Physically safe environment - no spills, clutter or unnecessary equipment/Purposeful Proactive Rounding/Room/bathroom lighting operational, light cord in reach

## 2023-12-21 NOTE — ED PROVIDER NOTE - CLINICAL SUMMARY MEDICAL DECISION MAKING FREE TEXT BOX
73-year-old female patient presents ED complaining of nausea, vomiting, patient noted to be febrile in the ED, sepsis workup initiated.  SBO versus gastroenteritis versus aspiration pneumonia. 73-year-old female patient presents ED complaining of nausea, vomiting, patient noted to be febrile in the ED, sepsis workup initiated.  SBO versus gastroenteritis versus aspiration pneumonia.    CT chest showing worsening multifocal PNE. Pt feels better after medication, no bowel obstruction on abdominal CT. Pt accepted for admission for PNE

## 2023-12-21 NOTE — H&P ADULT - ASSESSMENT
73-year-old female patient with a history of A-fib, COPD, TAVR,  tracheomalacia, aortic dissection, ostomy secondary to colorectal cancer, adrenal insufficiency, on steroids, presents to the ED complaining of nausea, vomiting and SOB.  Pt is a poor historian and not able to provide a full history. Only reports not feeling well and worsening SOB with productive cough   Per daughter over the phone, was not feeling well, was confused, with decrease appetite, not drinking, had an episode of vomiting.   In Ed, was febrile with elevated WBC   During my encounter she denied having fever, headache, dizziness, chest pain, palpitation, abdominal pain, change in urinary and bowel habits       Sepsis secondary to multifocal PNA and likely COPD exacerbation   admit to medicine   Febrile, elevated WBC   BC sent   COVID and Flu negative   Check RVP   s/p Meropenem in ED   Pt with extensive allergies to antibiotics   Per daughter, she tolerates Ertapenem with Benadryl injection to be given to ertapenem   C/w Ertapenem   Resume home bronchodilators   Mucinex for cough   Tylenol  for fever   IV fluids   c/w Zofran for nausea (she chronically takes zofran at home)   ID consult       Chronic afib   C/w Eliquis for anticoagulation   c/w Mexiletine for rate control     Type 2 DM   c/w Lantus 16 units and Admelog 5 TID   Follow HBA1C     Hx CAD   c/w Plavix and Atorvastatin    Plan of care discussed with patients daughter over the phone      73-year-old female patient with a history of A-fib, COPD, TAVR,  tracheomalacia, aortic dissection, ostomy secondary to colorectal cancer, adrenal insufficiency, on steroids, presents to the ED complaining of nausea, vomiting and SOB.  Pt is a poor historian and not able to provide a full history. Only reports not feeling well and worsening SOB with productive cough   Per daughter over the phone, was not feeling well, was confused, with decrease appetite, not drinking, had an episode of vomiting.   In Ed, was febrile with elevated WBC   During my encounter she denied having fever, headache, dizziness, chest pain, palpitation, abdominal pain, change in urinary and bowel habits       Sepsis secondary to multifocal PNA and likely COPD exacerbation   admit to medicine   Febrile, elevated WBC   BC sent   COVID and Flu negative   Check RVP   s/p Meropenem in ED   Pt with extensive allergies to antibiotics   Per daughter, she tolerates Ertapenem with Benadryl injection to be given to ertapenem   C/w Ertapenem   Resume home bronchodilators   Mucinex for cough   Tylenol  for fever   IV fluids   c/w Zofran for nausea (she chronically takes zofran at home)   ID consult       Chronic afib   C/w Eliquis for anticoagulation   c/w Mexiletine for rate control     Type 2 DM   c/w Lantus 16 units and Admelog 5 TID   Follow HBA1C     Hx CAD   c/w Plavix and Atorvastatin    Hx of Seizures   c/w Keppra     Plan of care discussed with patients daughter over the phone      73-year-old female patient with a history of A-fib, COPD, TAVR,  tracheomalacia, aortic dissection, ostomy secondary to colorectal cancer, adrenal insufficiency, on steroids, presents to the ED complaining of nausea, vomiting and SOB.  Pt is a poor historian and not able to provide a full history. Only reports not feeling well and worsening SOB with productive cough   Per daughter over the phone, was not feeling well, was confused, with decrease appetite, not drinking, had an episode of vomiting.   In Ed, was febrile with elevated WBC   During my encounter she denied having fever, headache, dizziness, chest pain, palpitation, abdominal pain, change in urinary and bowel habits       Sepsis secondary to multifocal PNA and likely COPD exacerbation   admit to medicine   Febrile, elevated WBC   BC sent   COVID and Flu negative   Check RVP   s/p Meropenem in ED   Pt with extensive allergies to antibiotics   Per daughter, she tolerates Ertapenem with Benadryl injection to be given to ertapenem   C/w Ertapenem   Resume home bronchodilators   Mucinex for cough   Tylenol  for fever   IV fluids   c/w Zofran for nausea (she chronically takes zofran at home)   ID consult       Chronic afib   C/w Eliquis for anticoagulation   c/w Mexiletine for rate control     Type 2 DM with neuropathy  c/w Lantus 16 units and Admelog 5 TID   c/w Gabapentin   Follow HBA1C     Hx CAD   c/w Plavix and Atorvastatin    Hx of Seizures   c/w Keppra     S/p Colostomy   c/w Senna daily     Plan of care discussed with patients daughter over the phone

## 2023-12-21 NOTE — ED PROVIDER NOTE - ATTENDING CONTRIBUTION TO CARE
multiple chronic medical problems; now with worsening fever, cough, and SOB; awake and alert; +rhonchorous lung sounds; abd soft nt nd; labs and imaging as noted; agree with resident plan of care    I personally saw the patient with the resident, and completed the key components of the history and physical exam. I then discussed the management plan with the resident.

## 2023-12-21 NOTE — ED PROVIDER NOTE - PHYSICAL EXAMINATION
Const:  patient is ill-appearing awake, alert, oriented to person, place and time.  In mild distress  HEENT:  dry oral mucous membranes  Eyes: PERRLA. No scleral icterus. EOMI.  Neck:. Soft and supple. Full ROM without pain. No cervical midline tenderness.   Cardiac:  tachycardia with irregularly irregular rhythm.  Harsh blowing holosystolic murmur at the right second intercostal space.  No lower extremity edema.  2+ peripheral pulses bilaterally in upper and lower extremities  Resp:  bilateral rales to the lower lobes  Abd: Soft, non-tender, non-distended. Normal bowel sounds in all 4 quadrants. No guarding or rebound. ostomy bag in place, no fecal output noted, no surrounding erythema or discharge  MSK: Spine midline and non-tender. No CVAT.  Skin: No rashes, abrasions or lacerations.  Neuro: Moves all extremities symmetrically. No motor or sensory deficits

## 2023-12-21 NOTE — H&P ADULT - NSHPLABSRESULTS_GEN_ALL_CORE
CBC Full  -  ( 21 Dec 2023 12:00 )  WBC Count : 24.77 K/uL  RBC Count : 6.01 M/uL  Hemoglobin : 11.8 g/dL  Hematocrit : 39.6 %  Platelet Count - Automated : 347 K/uL  Mean Cell Volume : 65.9 fl  Mean Cell Hemoglobin : 19.6 pg  Mean Cell Hemoglobin Concentration : 29.8 gm/dL  Auto Neutrophil # : 19.72 K/uL  Auto Lymphocyte # : 2.74 K/uL  Auto Monocyte # : 1.91 K/uL  Auto Eosinophil # : 0.04 K/uL  Auto Basophil # : 0.10 K/uL  Auto Neutrophil % : 79.6 %  Auto Lymphocyte % : 11.1 %  Auto Monocyte % : 7.7 %  Auto Eosinophil % : 0.2 %  Auto Basophil % : 0.4 %      12-21    139  |  97  |  19.6  ----------------------------<  328<H>  5.0   |  26.0  |  0.94    Ca    8.8      21 Dec 2023 12:00    TPro  6.9  /  Alb  3.3  /  TBili  0.4  /  DBili  x   /  AST  31  /  ALT  10  /  AlkPhos  93  12-21

## 2023-12-21 NOTE — H&P ADULT - HISTORY OF PRESENT ILLNESS
73-year-old female patient with a history of A-fib, COPD, TAVR,  tracheomalacia, aortic dissection, ostomy secondary to colorectal cancer, adrenal insufficiency, on steroids, presents to the ED complaining of nausea, vomiting and SOB.  Pt is a poor historian and not able to provide a full history. Only reports not feeling well and worsening SOB with productive cough   Per daughter over the phone, was not feeling well, was confused, with decrease appetite, not drinking, had an episode of vomiting.   In Ed, was febrile with elevated WBC   During my encounter she denied having fever, headache, dizziness, chest pain, palpitation, abdominal pain, change in urinary and bowel habits

## 2023-12-21 NOTE — H&P ADULT - NSICDXPASTSURGICALHX_GEN_ALL_CORE_FT
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    H/O pelvic surgery 5 years ago - s/p fracture    H/O total knee replacement, bilateral 5 years ago    History of partial hysterectomy 30 years ago - fibroids    History of sinus surgery multiple sinus surgeries    History of tracheomalacia 2015 - attempted tracheal stenting ([facility])- course complicated by obstruction, respiratory failure, multiple CPR attempts -  stent discontinued; 10/20/2016 Tracheobronchoplasty (Prolene Mesh) performed at [facility] by Dr Zapien    Rectal bleeding exam under anesthesia ([facility]) 2/2018    S/P aortic valve replacement     S/P bronchoscopy 6/5/2018 - [place] (Dr Zapien) no evidence of tracheobronchomalacia in trachea or bronchial tubes    S/P TAVR (transcatheter aortic valve replacement)      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    H/O pelvic surgery 5 years ago - s/p fracture    H/O total knee replacement, bilateral 5 years ago    History of partial hysterectomy 30 years ago - fibroids    History of sinus surgery multiple sinus surgeries    History of tracheomalacia 2015 - attempted tracheal stenting ([facility])- course complicated by obstruction, respiratory failure, multiple CPR attempts -  stent discontinued; 10/20/2016 Tracheobronchoplasty (Prolene Mesh) performed at [facility] by Dr Zapien    Rectal bleeding exam under anesthesia ([facility]) 2/2018    S/P aortic valve replacement     S/P bronchoscopy 6/5/2018 - [place] (Dr Zapien) no evidence of tracheobronchomalacia in trachea or bronchial tubes    S/P TAVR (transcatheter aortic valve replacement)

## 2023-12-21 NOTE — ED ADULT NURSE NOTE - NSICDXPASTSURGICALHX_GEN_ALL_CORE_FT
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    H/O pelvic surgery 5 years ago - s/p fracture    H/O total knee replacement, bilateral 5 years ago    History of partial hysterectomy 30 years ago - fibroids    History of sinus surgery multiple sinus surgeries    History of tracheomalacia 2015 - attempted tracheal stenting ([facility])- course complicated by obstruction, respiratory failure, multiple CPR attempts -  stent discontinued; 10/20/2016 Tracheobronchoplasty (Prolene Mesh) performed at [facility] by Dr Zapien    Rectal bleeding exam under anesthesia ([facility]) 2/2018    S/P aortic valve replacement     S/P bronchoscopy 6/5/2018 - [place] (Dr Zapien) no evidence of tracheobronchomalacia in trachea or bronchial tubes    S/P TAVR (transcatheter aortic valve replacement)      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    H/O pelvic surgery 5 years ago - s/p fracture    H/O total knee replacement, bilateral 5 years ago    History of partial hysterectomy 30 years ago - fibroids    History of sinus surgery multiple sinus surgeries    History of tracheomalacia 2015 - attempted tracheal stenting ([facility])- course complicated by obstruction, respiratory failure, multiple CPR attempts -  stent discontinued; 10/20/2016 Tracheobronchoplasty (Prolene Mesh) performed at [facility] by Dr Zapien    Rectal bleeding exam under anesthesia ([facility]) 2/2018    S/P aortic valve replacement     S/P bronchoscopy 6/5/2018 - [place] (Dr Zapien) no evidence of tracheobronchomalacia in trachea or bronchial tubes    S/P TAVR (transcatheter aortic valve replacement)

## 2023-12-21 NOTE — ED PROVIDER NOTE - NS ED ROS FT
Review of Systems:  	•	CONSTITUTIONAL - +FEVER  	•	EYES - no eye pain, no blurred vision  	•	ENT - no sore throat, no rhinorrhea   	•	RESPIRATORY - +COUGH  	•	CARDIAC - no chest pain, no palpitations  	•	GI - +NAUSEA, VOMITING   	•	GENITO-URINARY - No dysuria, frequency, or hematuria  	•	MUSCULOSKELETAL - no joint pain, no back pain, no neck pain  	•	NEUROLOGIC - no weakness, no headache, no gait abnormality, no LOC  	•	PSYCH - no anxiety, non suicidal, non homicidal, no hallucination, no depression

## 2023-12-21 NOTE — ED ADULT TRIAGE NOTE - CHIEF COMPLAINT QUOTE
from home here for vomitting and fevers with abd pain. pt has colostomy bag with no output for 24 hours.

## 2023-12-21 NOTE — H&P ADULT - NSHPPHYSICALEXAM_GEN_ALL_CORE
General: chronically ill looking lady, laying in bed, awake, but gets  frustrated at times and not able to provide a full history   Head and neck: swollen eyelids, no JVD   Cardio: irregular rate and rhythm, no murmur   Pulm: congested chest, no wheezing   GI: abdomen is soft, colostomy in place   Extr: no edema   Neuro: AOx2 to self and place, no focal weakness

## 2023-12-22 LAB
A1C WITH ESTIMATED AVERAGE GLUCOSE RESULT: 9.3 % — HIGH (ref 4–5.6)
A1C WITH ESTIMATED AVERAGE GLUCOSE RESULT: 9.3 % — HIGH (ref 4–5.6)
ESTIMATED AVERAGE GLUCOSE: 220 MG/DL — HIGH (ref 68–114)
ESTIMATED AVERAGE GLUCOSE: 220 MG/DL — HIGH (ref 68–114)
GLUCOSE BLDC GLUCOMTR-MCNC: 209 MG/DL — HIGH (ref 70–99)
GLUCOSE BLDC GLUCOMTR-MCNC: 209 MG/DL — HIGH (ref 70–99)
GLUCOSE BLDC GLUCOMTR-MCNC: 227 MG/DL — HIGH (ref 70–99)
GLUCOSE BLDC GLUCOMTR-MCNC: 227 MG/DL — HIGH (ref 70–99)
GLUCOSE BLDC GLUCOMTR-MCNC: 239 MG/DL — HIGH (ref 70–99)
GLUCOSE BLDC GLUCOMTR-MCNC: 239 MG/DL — HIGH (ref 70–99)
GLUCOSE BLDC GLUCOMTR-MCNC: 240 MG/DL — HIGH (ref 70–99)
GLUCOSE BLDC GLUCOMTR-MCNC: 240 MG/DL — HIGH (ref 70–99)

## 2023-12-22 PROCEDURE — 99223 1ST HOSP IP/OBS HIGH 75: CPT

## 2023-12-22 PROCEDURE — 99233 SBSQ HOSP IP/OBS HIGH 50: CPT | Mod: GC

## 2023-12-22 RX ADMIN — Medication 1200 MILLIGRAM(S): at 05:15

## 2023-12-22 RX ADMIN — GABAPENTIN 100 MILLIGRAM(S): 400 CAPSULE ORAL at 13:15

## 2023-12-22 RX ADMIN — MEXILETINE HYDROCHLORIDE 200 MILLIGRAM(S): 150 CAPSULE ORAL at 13:15

## 2023-12-22 RX ADMIN — ATORVASTATIN CALCIUM 20 MILLIGRAM(S): 80 TABLET, FILM COATED ORAL at 22:16

## 2023-12-22 RX ADMIN — Medication 3 MILLILITER(S): at 07:36

## 2023-12-22 RX ADMIN — Medication 2: at 13:14

## 2023-12-22 RX ADMIN — CLOPIDOGREL BISULFATE 75 MILLIGRAM(S): 75 TABLET, FILM COATED ORAL at 13:16

## 2023-12-22 RX ADMIN — TIOTROPIUM BROMIDE 2 PUFF(S): 18 CAPSULE ORAL; RESPIRATORY (INHALATION) at 07:35

## 2023-12-22 RX ADMIN — Medication 3 MILLILITER(S): at 14:51

## 2023-12-22 RX ADMIN — Medication 5 UNIT(S): at 08:36

## 2023-12-22 RX ADMIN — Medication 3 MILLILITER(S): at 21:33

## 2023-12-22 RX ADMIN — Medication 1200 MILLIGRAM(S): at 18:57

## 2023-12-22 RX ADMIN — GABAPENTIN 100 MILLIGRAM(S): 400 CAPSULE ORAL at 22:16

## 2023-12-22 RX ADMIN — Medication 50 MILLIGRAM(S): at 13:15

## 2023-12-22 RX ADMIN — APIXABAN 5 MILLIGRAM(S): 2.5 TABLET, FILM COATED ORAL at 18:57

## 2023-12-22 RX ADMIN — MEXILETINE HYDROCHLORIDE 200 MILLIGRAM(S): 150 CAPSULE ORAL at 22:46

## 2023-12-22 RX ADMIN — LEVETIRACETAM 500 MILLIGRAM(S): 250 TABLET, FILM COATED ORAL at 05:15

## 2023-12-22 RX ADMIN — Medication 5 UNIT(S): at 13:14

## 2023-12-22 RX ADMIN — Medication 2: at 08:37

## 2023-12-22 RX ADMIN — FAMOTIDINE 20 MILLIGRAM(S): 10 INJECTION INTRAVENOUS at 13:15

## 2023-12-22 RX ADMIN — MEXILETINE HYDROCHLORIDE 200 MILLIGRAM(S): 150 CAPSULE ORAL at 05:16

## 2023-12-22 RX ADMIN — LEVETIRACETAM 500 MILLIGRAM(S): 250 TABLET, FILM COATED ORAL at 18:57

## 2023-12-22 RX ADMIN — MONTELUKAST 10 MILLIGRAM(S): 4 TABLET, CHEWABLE ORAL at 13:15

## 2023-12-22 RX ADMIN — Medication 5 UNIT(S): at 18:58

## 2023-12-22 RX ADMIN — INSULIN GLARGINE 16 UNIT(S): 100 INJECTION, SOLUTION SUBCUTANEOUS at 22:16

## 2023-12-22 RX ADMIN — Medication 2: at 18:57

## 2023-12-22 RX ADMIN — APIXABAN 5 MILLIGRAM(S): 2.5 TABLET, FILM COATED ORAL at 05:15

## 2023-12-22 RX ADMIN — GABAPENTIN 100 MILLIGRAM(S): 400 CAPSULE ORAL at 05:15

## 2023-12-22 RX ADMIN — ERTAPENEM SODIUM 120 MILLIGRAM(S): 1 INJECTION, POWDER, LYOPHILIZED, FOR SOLUTION INTRAMUSCULAR; INTRAVENOUS at 05:15

## 2023-12-22 RX ADMIN — Medication 8 MILLIGRAM(S): at 05:15

## 2023-12-22 NOTE — CONSULT NOTE ADULT - SUBJECTIVE AND OBJECTIVE BOX
[facility]  INFECTIOUS DISEASES at [place] / [place] / [place]  =======================================================                               Allen Ibanez MD#   Iam Benjamin MD*                             Opal Da Silva MD*   Michelle Dowell MD*                              Professor Emeritus:  Dr Choco Hewitt MD^            Diplomates American Board of Internal Medicine & Infectious Diseases                # [place] Office - Appt - Tel  [phone] Fax [phone]                * [place] Office - Appt - Tel [phone] Fax [phone]               ^ [place] Office - Appt - Tel  [phone] Fax [phone]                                  Hospital Consult line:  [phone]  =======================================================      [id number]  RAYRAY RODRIGUEZ    History obtained from the chart. Unable to obtain medical history from patient due to medical condition     CC: Patient is a 73y old  Female who presents with a chief complaint of SOB (22 Dec 2023 13:58)      73y  Female with h/o A-fib, COPD, TAVR, tracheomalacia, aortic dissection, ostomy secondary to colorectal cancer, adrenal insufficiency, on steroids. Patient presents to the ED complaining of nausea, vomiting and SOB. Patient reports not feeling well, unable to elaborate on her symptoms. In the ER patient is febrile to 101.9F, leukocytosis. Started on Ertapenem. ID input requested.       Past Medical & Surgical Hx:  Atrial fibrillation paroxysmal, on eliquis  Diabetes Type 2  COPD (chronic obstructive pulmonary disease)  Adrenal insufficiency  Medrol daily for over 50 years  Aortic insufficiency moderate AR on echo 5/3/2018  Pelvic fracture  Asthma  Tracheobronchomalacia diagnosed 2015, s/p bronchial thermoplasty 2016 (Dr Zapien); recent bronchoscopy 6/5/2018 revealed no evidence of tracheobronchomalacia in trachea or bronchial tubes  Colorectal cancer 4/2018- last treatment , chemo and radiation  Rectal bleeding  Seizure x 1 1/7/18  DVT (deep venous thrombosis) 15-20 years ago, took coumadin  TIA (transient ischemic attack) multiple, last 5 years ago - presents as right-sided weakness  History of partial hysterectomy 30 years ago - fibroids  H/O total knee replacement, bilateral 5 years ago  History of sinus surgery multiple sinus surgeries  Exostosis of orbit, 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  H/O pelvic surgery 5 years ago - s/p fracture  History of tracheomalacia 2015 - attempted tracheal stenting ([facility])- course complicated by obstruction, respiratory failure, multiple CPR attempts -  stent discontinued; 10/20/2016 Tracheobronchoplasty (Prolene Mesh) performed at [facility] by Dr Zapien  S/P bronchoscopy 6/5/2018 - [place] (Dr Zapien) no evidence of tracheobronchomalacia in trachea or bronchial tubes  Rectal bleeding exam under anesthesia ([facility]) 2/2018  S/P TAVR (transcatheter aortic valve replacement)  S/P aortic valve replacement      Social Hx:  Denies smoking      FAMILY HISTORY:  Family history of asthma  Family history of breast cancer (Sibling)  Family history of diabetes mellitus type II      Allergies  aspirin (Short breath)  Dilaudid (Short breath)  tetanus toxoid (Short breath)  Avelox (Short breath; Pruritus)  Valium (Short breath)  codeine (Short breath)  shellfish (Anaphylaxis)  cefepime (Anaphylaxis)  penicillin (Anaphylaxis)  iodine (Short breath; Swelling)  Intolerances       REVIEW OF SYSTEMS:  Unable to to assess. Patient is confused and not answering questions appropriately       Physical Exam:  GEN: NAD  HEENT: normocephalic and atraumatic.   NECK: Supple.   LUNGS: + rales   HEART: RRR  ABDOMEN: Soft, NT, ND.  +BS.    : No CVA tenderness  EXTREMITIES: Without  edema.  MSK: No joint swelling  NEUROLOGIC: confused   SKIN: No rash      Weight (kg): 71.1 (12-21 @ 20:00)      Vitals:  T(F): 98.6 (22 Dec 2023 09:21), Max: 99.6 (22 Dec 2023 00:00)  HR: 70 (22 Dec 2023 09:21)  BP: 136/68 (22 Dec 2023 09:21)  RR: 18 (22 Dec 2023 09:21)  SpO2: 99% (22 Dec 2023 09:21) (92% - 99%)  temp max in last 48H T(F): , Max: 101.9 (12-21-23 @ 10:10)      Current Antibiotics:  ertapenem  IVPB 1000 milliGRAM(s) IV Intermittent every 24 hours    Other medications:  albuterol/ipratropium for Nebulization 3 milliLiter(s) Nebulizer every 6 hours  apixaban 5 milliGRAM(s) Oral every 12 hours  atorvastatin 20 milliGRAM(s) Oral at bedtime  clopidogrel Tablet 75 milliGRAM(s) Oral daily  dextrose 5%. 1000 milliLiter(s) IV Continuous <Continuous>  dextrose 5%. 1000 milliLiter(s) IV Continuous <Continuous>  dextrose 50% Injectable 25 Gram(s) IV Push once  dextrose 50% Injectable 25 Gram(s) IV Push once  dextrose 50% Injectable 12.5 Gram(s) IV Push once  diphenhydrAMINE Injectable 50 milliGRAM(s) IV Push daily  famotidine    Tablet 20 milliGRAM(s) Oral daily  gabapentin 100 milliGRAM(s) Oral every 8 hours  glucagon  Injectable 1 milliGRAM(s) IntraMuscular once  guaiFENesin ER 1200 milliGRAM(s) Oral every 12 hours  insulin glargine Injectable (LANTUS) 16 Unit(s) SubCutaneous at bedtime  insulin lispro (ADMELOG) corrective regimen sliding scale   SubCutaneous three times a day before meals  insulin lispro Injectable (ADMELOG) 5 Unit(s) SubCutaneous three times a day before meals  levETIRAcetam 500 milliGRAM(s) Oral two times a day  methylPREDNISolone 8 milliGRAM(s) Oral daily  mexiletine 200 milliGRAM(s) Oral every 8 hours  montelukast 10 milliGRAM(s) Oral daily  sodium chloride 0.9%. 1000 milliLiter(s) IV Continuous <Continuous>  tiotropium 2.5 MICROgram(s) Inhaler 2 Puff(s) Inhalation daily                 11.8   24.77 )-----------( 347      ( 21 Dec 2023 12:00 )             39.6     12-21    139  |  97  |  19.6  ----------------------------<  328<H>  5.0   |  26.0  |  0.94    Ca    8.8      21 Dec 2023 12:00    TPro  6.9  /  Alb  3.3  /  TBili  0.4  /  DBili  x   /  AST  31  /  ALT  10  /  AlkPhos  93  12-21    RECENT CULTURES:  12-21 @ 17:07    RVP  NotDetec      WBC Count: 24.77 K/uL (12-21-23 @ 12:00)    Creatinine: 0.94 mg/dL (12-21-23 @ 12:00)     SARS-CoV-2: NotDetec (12-21-23 @ 17:07)  SARS-CoV-2 Result: NotDetec (12-21-23 @ 12:00)          < from: CT Chest No Cont (12.21.23 @ 14:35) >  ACC: [id number] EXAM:  CT CHEST   ORDERED BY: WILLIAM WIEDEMANN     ACC: [id number] EXAM:  CT ABDOMEN AND PELVIS   ORDERED BY: MICHAEL COX     PROCEDURE DATE:  12/21/2023      INTERPRETATION:  CT CHEST, ABDOMEN AND PELVIS WITHOUT CONTRAST    Clinical Indication: Pneumonia.  Vomiting and fevers with abd pain.   Patient has colostomy bag with no output for 24 hours, evaluate for   obstruction.  History of anal squamous cell carcinoma.    Technique: Axial CT images of the chest, abdomen and pelvis were obtained   from the lung apices to the pubic symphysis without oral or IV contrast.    Coronal and sagittal reformatted images were created and reviewed.    Comparison:  Prior CT of the chest from 10/28/2023, CT of the chest from   9/28/2023,CT abdomen and pelvis from 8/23/2023.  CT of the chest,   abdomen and pelvis from 9/1/2023, 8/1/2023.  CT of the abdomen and pelvis   from 9/18/2017.  CT of the chest from 9/22/2016.    Findings:  CHEST:    LUNGS AND LARGE AIRWAYS:   The tracheobronchial tree is patent. Bilateral   patchy airspace opacities are noted, right lung worse than left, and most   prominently seen in the right middle lobe; findings are significantly   worsened compared to the prior study from 10/28/2023. Trace secretions  are seen posteriorly in the trachea. Mild right apical paraseptal   emphysematous changes.    PLEURA: No pleural effusion. No pneumothorax.    VESSELS: Status post ascending aortic repair and aortic valve replacement   with redemonstrated valve-in-valve TAVR. Known residual dissection   involving the thoracoabdominal aorta is better seen on recent   contrast-enhanced CT from August and September, 2023. The pulmonary   artery is mildly enlarged, correlate for pulmonary artery hypertension.    HEART: Heart size is normal. No pericardial effusion.  Coronary artery   calcifications.    MEDIASTINUM AND MARANDA: There is no mediastinal lymphadenopathy.    Evaluation for hilar lymphadenopathy is limited without IV contrast,   however there is no gross hilar lymphadenopathy.    CHEST WALL AND LOWER NECK: There is no axillary lymphadenopathy.    ABDOMEN/PELVIS:  LIVER: Unenhanced liver appears within normal limits.  BILE DUCTS: Normal caliber.  GALLBLADDER: Within normal limits.  SPLEEN: Unenhancedspleen appears within normal limits.  PANCREAS: Unenhanced pancreas again appears atrophic.  Remonstrated   pancreatic hypodensities, measuring up to 2.4 cm at the pancreatic tail,   stable from multiple prior studies.  ADRENALS: Unenhanced adrenals appear within normal limits.  KIDNEYS/URETERS: No hydroureteronephrosis.  Known, partially calcified   left renal cysts appear grossly patent.    BLADDER: Appears grossly within normal limits, partially obscured by   metallic streak artifact from orthopedic hardware in the pubic symphysis    REPRODUCTIVE ORGANS: Hysterectomy.    BOWEL:  Status post abdominoperineal resection with left-sided colostomy.   Moderate to large amount of stool throughout the colon extending to the   ostomy, correlate forconstipation. No evidence for bowel obstruction or   inflammation.    Appendix is normal.    PERITONEUM: No ascites. Postsurgical presacral changes suboptimally   assessed however without significant change from prior studies.  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  Scattered   atherosclerotic calcifications of the aortoiliac tree and proximal thigh   vasculature.    LYMPH NODES: No lymphadenopathy.  ABDOMINAL WALL: Postsurgical changes.  BONES: No suspicious osseous lesion. Several densely sclerotic foci   throughout the vertebral bodies and ribs are stable from multiple prior   studiesand probably represent bone islands. Median sternotomy wires are   midline appear intact. Status post plate-screw fixation of pubic   symphysis fracture.  Redemonstrated horizontally oriented orthopedic   screw traversing the right ilium and sacroiliac joint  Mild lumbar   scoliosis, convex to the left with the apex at L4-L5.  Mild spinal canal   stenosis at L3-L4 and L4-L5 with posterior disc bulges.   Avascular   necrosis in both femoral heads again seen.    OTHER:  None    IMPRESSION:  Multifocal pneumonia, as detailed above; findings are progressed since   the prior CT of the chest from 10/28/2023. Recommend clinical correlation   and follow-up imaging to document resolution.    Status post abdominoperineal resection with left-sided colostomy.   Moderate to large amount of stool throughout the colon extending to the   ostomy, correlate for constipation. No evidence for bowel obstruction or   inflammation.    Appendix is normal.    Status post ascending aortic repair and aortic valve replacement.    Additional findings, as above.    --- End of Report ---    < end of copied text >         [facility]  INFECTIOUS DISEASES at [place] / [place] / [place]  =======================================================                               Allen Ibanez MD#   Iam Benjamin MD*                             Opal Da Silva MD*   Michelle Dowell MD*                              Professor Emeritus:  Dr Choco Hewitt MD^            Diplomates American Board of Internal Medicine & Infectious Diseases                # [place] Office - Appt - Tel  [phone] Fax [phone]                * [place] Office - Appt - Tel [phone] Fax [phone]               ^ [place] Office - Appt - Tel  [phone] Fax [phone]                                  Hospital Consult line:  [phone]  =======================================================      [id number]  RAYRAY RODRIGUEZ    History obtained from the chart. Unable to obtain medical history from patient due to medical condition     CC: Patient is a 73y old  Female who presents with a chief complaint of SOB (22 Dec 2023 13:58)      73y  Female with h/o A-fib, COPD, TAVR, tracheomalacia, aortic dissection, ostomy secondary to colorectal cancer, adrenal insufficiency, on steroids. Patient presents to the ED complaining of nausea, vomiting and SOB. Patient reports not feeling well, unable to elaborate on her symptoms. In the ER patient is febrile to 101.9F, leukocytosis. Started on Ertapenem. ID input requested.       Past Medical & Surgical Hx:  Atrial fibrillation paroxysmal, on eliquis  Diabetes Type 2  COPD (chronic obstructive pulmonary disease)  Adrenal insufficiency  Medrol daily for over 50 years  Aortic insufficiency moderate AR on echo 5/3/2018  Pelvic fracture  Asthma  Tracheobronchomalacia diagnosed 2015, s/p bronchial thermoplasty 2016 (Dr Zapien); recent bronchoscopy 6/5/2018 revealed no evidence of tracheobronchomalacia in trachea or bronchial tubes  Colorectal cancer 4/2018- last treatment , chemo and radiation  Rectal bleeding  Seizure x 1 1/7/18  DVT (deep venous thrombosis) 15-20 years ago, took coumadin  TIA (transient ischemic attack) multiple, last 5 years ago - presents as right-sided weakness  History of partial hysterectomy 30 years ago - fibroids  H/O total knee replacement, bilateral 5 years ago  History of sinus surgery multiple sinus surgeries  Exostosis of orbit, 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  H/O pelvic surgery 5 years ago - s/p fracture  History of tracheomalacia 2015 - attempted tracheal stenting ([facility])- course complicated by obstruction, respiratory failure, multiple CPR attempts -  stent discontinued; 10/20/2016 Tracheobronchoplasty (Prolene Mesh) performed at [facility] by Dr Zapien  S/P bronchoscopy 6/5/2018 - [place] (Dr Zapien) no evidence of tracheobronchomalacia in trachea or bronchial tubes  Rectal bleeding exam under anesthesia ([facility]) 2/2018  S/P TAVR (transcatheter aortic valve replacement)  S/P aortic valve replacement      Social Hx:  Denies smoking      FAMILY HISTORY:  Family history of asthma  Family history of breast cancer (Sibling)  Family history of diabetes mellitus type II      Allergies  aspirin (Short breath)  Dilaudid (Short breath)  tetanus toxoid (Short breath)  Avelox (Short breath; Pruritus)  Valium (Short breath)  codeine (Short breath)  shellfish (Anaphylaxis)  cefepime (Anaphylaxis)  penicillin (Anaphylaxis)  iodine (Short breath; Swelling)  Intolerances       REVIEW OF SYSTEMS:  Unable to to assess. Patient is confused and not answering questions appropriately       Physical Exam:  GEN: NAD  HEENT: normocephalic and atraumatic.   NECK: Supple.   LUNGS: + rales   HEART: RRR  ABDOMEN: Soft, NT, ND.  +BS.    : No CVA tenderness  EXTREMITIES: Without  edema.  MSK: No joint swelling  NEUROLOGIC: confused   SKIN: No rash      Weight (kg): 71.1 (12-21 @ 20:00)      Vitals:  T(F): 98.6 (22 Dec 2023 09:21), Max: 99.6 (22 Dec 2023 00:00)  HR: 70 (22 Dec 2023 09:21)  BP: 136/68 (22 Dec 2023 09:21)  RR: 18 (22 Dec 2023 09:21)  SpO2: 99% (22 Dec 2023 09:21) (92% - 99%)  temp max in last 48H T(F): , Max: 101.9 (12-21-23 @ 10:10)      Current Antibiotics:  ertapenem  IVPB 1000 milliGRAM(s) IV Intermittent every 24 hours    Other medications:  albuterol/ipratropium for Nebulization 3 milliLiter(s) Nebulizer every 6 hours  apixaban 5 milliGRAM(s) Oral every 12 hours  atorvastatin 20 milliGRAM(s) Oral at bedtime  clopidogrel Tablet 75 milliGRAM(s) Oral daily  dextrose 5%. 1000 milliLiter(s) IV Continuous <Continuous>  dextrose 5%. 1000 milliLiter(s) IV Continuous <Continuous>  dextrose 50% Injectable 25 Gram(s) IV Push once  dextrose 50% Injectable 25 Gram(s) IV Push once  dextrose 50% Injectable 12.5 Gram(s) IV Push once  diphenhydrAMINE Injectable 50 milliGRAM(s) IV Push daily  famotidine    Tablet 20 milliGRAM(s) Oral daily  gabapentin 100 milliGRAM(s) Oral every 8 hours  glucagon  Injectable 1 milliGRAM(s) IntraMuscular once  guaiFENesin ER 1200 milliGRAM(s) Oral every 12 hours  insulin glargine Injectable (LANTUS) 16 Unit(s) SubCutaneous at bedtime  insulin lispro (ADMELOG) corrective regimen sliding scale   SubCutaneous three times a day before meals  insulin lispro Injectable (ADMELOG) 5 Unit(s) SubCutaneous three times a day before meals  levETIRAcetam 500 milliGRAM(s) Oral two times a day  methylPREDNISolone 8 milliGRAM(s) Oral daily  mexiletine 200 milliGRAM(s) Oral every 8 hours  montelukast 10 milliGRAM(s) Oral daily  sodium chloride 0.9%. 1000 milliLiter(s) IV Continuous <Continuous>  tiotropium 2.5 MICROgram(s) Inhaler 2 Puff(s) Inhalation daily                 11.8   24.77 )-----------( 347      ( 21 Dec 2023 12:00 )             39.6     12-21    139  |  97  |  19.6  ----------------------------<  328<H>  5.0   |  26.0  |  0.94    Ca    8.8      21 Dec 2023 12:00    TPro  6.9  /  Alb  3.3  /  TBili  0.4  /  DBili  x   /  AST  31  /  ALT  10  /  AlkPhos  93  12-21    RECENT CULTURES:  12-21 @ 17:07    RVP  NotDetec      WBC Count: 24.77 K/uL (12-21-23 @ 12:00)    Creatinine: 0.94 mg/dL (12-21-23 @ 12:00)     SARS-CoV-2: NotDetec (12-21-23 @ 17:07)  SARS-CoV-2 Result: NotDetec (12-21-23 @ 12:00)          < from: CT Chest No Cont (12.21.23 @ 14:35) >  ACC: [id number] EXAM:  CT CHEST   ORDERED BY: WILLIAM WIEDEMANN     ACC: [id number] EXAM:  CT ABDOMEN AND PELVIS   ORDERED BY: MICHAEL COX     PROCEDURE DATE:  12/21/2023      INTERPRETATION:  CT CHEST, ABDOMEN AND PELVIS WITHOUT CONTRAST    Clinical Indication: Pneumonia.  Vomiting and fevers with abd pain.   Patient has colostomy bag with no output for 24 hours, evaluate for   obstruction.  History of anal squamous cell carcinoma.    Technique: Axial CT images of the chest, abdomen and pelvis were obtained   from the lung apices to the pubic symphysis without oral or IV contrast.    Coronal and sagittal reformatted images were created and reviewed.    Comparison:  Prior CT of the chest from 10/28/2023, CT of the chest from   9/28/2023,CT abdomen and pelvis from 8/23/2023.  CT of the chest,   abdomen and pelvis from 9/1/2023, 8/1/2023.  CT of the abdomen and pelvis   from 9/18/2017.  CT of the chest from 9/22/2016.    Findings:  CHEST:    LUNGS AND LARGE AIRWAYS:   The tracheobronchial tree is patent. Bilateral   patchy airspace opacities are noted, right lung worse than left, and most   prominently seen in the right middle lobe; findings are significantly   worsened compared to the prior study from 10/28/2023. Trace secretions  are seen posteriorly in the trachea. Mild right apical paraseptal   emphysematous changes.    PLEURA: No pleural effusion. No pneumothorax.    VESSELS: Status post ascending aortic repair and aortic valve replacement   with redemonstrated valve-in-valve TAVR. Known residual dissection   involving the thoracoabdominal aorta is better seen on recent   contrast-enhanced CT from August and September, 2023. The pulmonary   artery is mildly enlarged, correlate for pulmonary artery hypertension.    HEART: Heart size is normal. No pericardial effusion.  Coronary artery   calcifications.    MEDIASTINUM AND MARANDA: There is no mediastinal lymphadenopathy.    Evaluation for hilar lymphadenopathy is limited without IV contrast,   however there is no gross hilar lymphadenopathy.    CHEST WALL AND LOWER NECK: There is no axillary lymphadenopathy.    ABDOMEN/PELVIS:  LIVER: Unenhanced liver appears within normal limits.  BILE DUCTS: Normal caliber.  GALLBLADDER: Within normal limits.  SPLEEN: Unenhancedspleen appears within normal limits.  PANCREAS: Unenhanced pancreas again appears atrophic.  Remonstrated   pancreatic hypodensities, measuring up to 2.4 cm at the pancreatic tail,   stable from multiple prior studies.  ADRENALS: Unenhanced adrenals appear within normal limits.  KIDNEYS/URETERS: No hydroureteronephrosis.  Known, partially calcified   left renal cysts appear grossly patent.    BLADDER: Appears grossly within normal limits, partially obscured by   metallic streak artifact from orthopedic hardware in the pubic symphysis    REPRODUCTIVE ORGANS: Hysterectomy.    BOWEL:  Status post abdominoperineal resection with left-sided colostomy.   Moderate to large amount of stool throughout the colon extending to the   ostomy, correlate forconstipation. No evidence for bowel obstruction or   inflammation.    Appendix is normal.    PERITONEUM: No ascites. Postsurgical presacral changes suboptimally   assessed however without significant change from prior studies.  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  Scattered   atherosclerotic calcifications of the aortoiliac tree and proximal thigh   vasculature.    LYMPH NODES: No lymphadenopathy.  ABDOMINAL WALL: Postsurgical changes.  BONES: No suspicious osseous lesion. Several densely sclerotic foci   throughout the vertebral bodies and ribs are stable from multiple prior   studiesand probably represent bone islands. Median sternotomy wires are   midline appear intact. Status post plate-screw fixation of pubic   symphysis fracture.  Redemonstrated horizontally oriented orthopedic   screw traversing the right ilium and sacroiliac joint  Mild lumbar   scoliosis, convex to the left with the apex at L4-L5.  Mild spinal canal   stenosis at L3-L4 and L4-L5 with posterior disc bulges.   Avascular   necrosis in both femoral heads again seen.    OTHER:  None    IMPRESSION:  Multifocal pneumonia, as detailed above; findings are progressed since   the prior CT of the chest from 10/28/2023. Recommend clinical correlation   and follow-up imaging to document resolution.    Status post abdominoperineal resection with left-sided colostomy.   Moderate to large amount of stool throughout the colon extending to the   ostomy, correlate for constipation. No evidence for bowel obstruction or   inflammation.    Appendix is normal.    Status post ascending aortic repair and aortic valve replacement.    Additional findings, as above.    --- End of Report ---    < end of copied text >

## 2023-12-22 NOTE — PROGRESS NOTE ADULT - SUBJECTIVE AND OBJECTIVE BOX
73-year-old female patient with a history of A-fib, COPD, TAVR,  tracheomalacia, aortic dissection, ostomy secondary to colorectal cancer, adrenal insufficiency, on steroids, presents to the ED complaining of nausea, vomiting and SOB. Pt is a poor historian and not able to provide a full history. Only reports not feeling well and worsening SOB with productive cough.  Per daughter over the phone, was not feeling well, was confused, with decrease appetite, not drinking, had an episode of vomiting.  In Ed, noted to be febrile with elevated WBC.     INTERVAL HPI/OVERNIGHT EVENTS:  -No acute events    SUBJECTIVE:   - Patient seen and examined at bedside.   - Reports breathing as better compared to when she came in.  - Reports difficulty sleeping  - Denies pain    ROS: All negative except as listed above.    VITAL SIGNS:  ICU Vital Signs Last 24 Hrs  T(C): 37 (22 Dec 2023 09:21), Max: 37.6 (22 Dec 2023 00:00)  T(F): 98.6 (22 Dec 2023 09:21), Max: 99.6 (22 Dec 2023 00:00)  HR: 70 (22 Dec 2023 09:21) (66 - 82)  BP: 136/68 (22 Dec 2023 09:21) (105/56 - 136/68)  BP(mean): --  ABP: --  ABP(mean): --  RR: 18 (22 Dec 2023 09:21) (18 - 20)  SpO2: 99% (22 Dec 2023 09:21) (92% - 99%)    O2 Parameters below as of 22 Dec 2023 09:21  Patient On (Oxygen Delivery Method): room air    Plateau pressure:   P/F ratio:     CAPILLARY BLOOD GLUCOSE    POCT Blood Glucose.: 240 mg/dL (22 Dec 2023 12:46)    ECG: reviewed.    PHYSICAL EXAM:    GENERAL: NAD  HEAD: Swollen Eyelids, no JVD  EYES: EOMI, PERRLA, conjunctiva and sclera clear  NECK: Supple, trachea midline, no JVD  HEART: Irregular rate and rhythm, no murmurs,   LUNGS: +Congested  ABDOMEN: Soft, nontender, nondistended, colostomy in place  EXTREMITIES: No edema  NERVOUS SYSTEM:  A&Ox2, no focal deficits       MEDICATIONS:  MEDICATIONS  (STANDING):  albuterol/ipratropium for Nebulization 3 milliLiter(s) Nebulizer every 6 hours  apixaban 5 milliGRAM(s) Oral every 12 hours  atorvastatin 20 milliGRAM(s) Oral at bedtime  clopidogrel Tablet 75 milliGRAM(s) Oral daily  dextrose 5%. 1000 milliLiter(s) (100 mL/Hr) IV Continuous <Continuous>  dextrose 5%. 1000 milliLiter(s) (50 mL/Hr) IV Continuous <Continuous>  dextrose 50% Injectable 25 Gram(s) IV Push once  dextrose 50% Injectable 25 Gram(s) IV Push once  dextrose 50% Injectable 12.5 Gram(s) IV Push once  diphenhydrAMINE Injectable 50 milliGRAM(s) IV Push daily  ertapenem  IVPB 1000 milliGRAM(s) IV Intermittent every 24 hours  famotidine    Tablet 20 milliGRAM(s) Oral daily  gabapentin 100 milliGRAM(s) Oral every 8 hours  glucagon  Injectable 1 milliGRAM(s) IntraMuscular once  guaiFENesin ER 1200 milliGRAM(s) Oral every 12 hours  insulin glargine Injectable (LANTUS) 16 Unit(s) SubCutaneous at bedtime  insulin lispro (ADMELOG) corrective regimen sliding scale   SubCutaneous three times a day before meals  insulin lispro Injectable (ADMELOG) 5 Unit(s) SubCutaneous three times a day before meals  levETIRAcetam 500 milliGRAM(s) Oral two times a day  methylPREDNISolone 8 milliGRAM(s) Oral daily  mexiletine 200 milliGRAM(s) Oral every 8 hours  montelukast 10 milliGRAM(s) Oral daily  sodium chloride 0.9%. 1000 milliLiter(s) (100 mL/Hr) IV Continuous <Continuous>  tiotropium 2.5 MICROgram(s) Inhaler 2 Puff(s) Inhalation daily    MEDICATIONS  (PRN):  dextrose Oral Gel 15 Gram(s) Oral once PRN Blood Glucose LESS THAN 70 milliGRAM(s)/deciliter  ondansetron    Tablet 4 milliGRAM(s) Oral every 6 hours PRN for nausea    ALLERGIES:  Allergies    aspirin (Short breath)  Dilaudid (Short breath)  tetanus toxoid (Short breath)  Avelox (Short breath; Pruritus)  Valium (Short breath)  codeine (Short breath)  shellfish (Anaphylaxis)  cefepime (Anaphylaxis)  penicillin (Anaphylaxis)  iodine (Short breath; Swelling)    Intolerances    LABS:                        11.8   24.77 )-----------( 347      ( 21 Dec 2023 12:00 )             39.6     12-21    139  |  97  |  19.6  ----------------------------<  328<H>  5.0   |  26.0  |  0.94    Ca    8.8      21 Dec 2023 12:00    TPro  6.9  /  Alb  3.3  /  TBili  0.4  /  DBili  x   /  AST  31  /  ALT  10  /  AlkPhos  93  12-21    PT/INR - ( 21 Dec 2023 12:00 )   PT: 15.4 sec;   INR: 1.40 ratio      PTT - ( 21 Dec 2023 12:00 )  PTT:36.7 sec  Urinalysis Basic - ( 21 Dec 2023 12:00 )    Color: x / Appearance: x / SG: x / pH: x  Gluc: 328 mg/dL / Ketone: x  / Bili: x / Urobili: x   Blood: x / Protein: x / Nitrite: x   Leuk Esterase: x / RBC: x / WBC x   Sq Epi: x / Non Sq Epi: x / Bacteria: x    RADIOLOGY & ADDITIONAL TESTS: Reviewed.

## 2023-12-22 NOTE — PROGRESS NOTE ADULT - ASSESSMENT
73-year-old female patient with a history of A-fib, COPD, TAVR,  tracheomalacia, aortic dissection, ostomy secondary to colorectal cancer, adrenal insufficiency, on steroids, presents to the ED complaining of nausea, vomiting and SOB.  Pt is a poor historian and not able to provide a full history. Only reports not feeling well and worsening SOB with productive cough   Per daughter over the phone, was not feeling well, was confused, with decrease appetite, not drinking, had an episode of vomiting. In Ed, was febrile with elevated WBC, found to be septic.     #Sepsis secondary to multifocal PNA and likely COPD exacerbation   - Febrile, elevated WBC   - BC sent   - COVID and Flu negative   - Check RVP   - s/p Meropenem in ED   - Pt with extensive allergies to antibiotics   - Per daughter, she tolerates Ertapenem with Benadryl injection to be given to ertapenem   - c/w Ertapenem infusion  - c/w diphenhydramine 50mg IV daily  - c/w tiotropium 2.5 micrograms 2 puff daily  - c/w albuterol/ipratropium 3 mL q6   - c/w Mucinex 1200 oral q12  - c/w methylprednisolone 8 mg daily  - c/w montelukast 10 mg dialy  - c/w IV fluids   - c/w Zofran 4mg q6  - ID consult     #Chronic afib   - c/w Eliquis 5mg q12  - c/w Mexiletine 200 mg q8     #Type 2 DM with neuropathy  - c/w Lantus 16 units and Admelog 5 TID   - c/w Gabapentin 100 mg q8   - c/w trending HBA1C     #CAD   - c/w Plavix 75mg daily  - c/w atorvastatin 20 mg at bedtime    #Seizures   -c/w levetiracetam 500mg BID    S/p Colostomy   c/w Senna daily     CODE STATUS: Full Code

## 2023-12-22 NOTE — CONSULT NOTE ADULT - ASSESSMENT
73y  Female with h/o A-fib, COPD, TAVR, tracheomalacia, aortic dissection, ostomy secondary to colorectal cancer, adrenal insufficiency, on steroids. Patient presents to the ED complaining of nausea, vomiting and SOB. Patient reports not feeling well, unable to elaborate on her symptoms. In the ER patient is febrile to 101.9F, leukocytosis. Started on Ertapenem.      Aspiration PNA  Fever  Leukocytosis   Multiple antibiotic allergies       - Blood cultures 12/21 pending  - RVP/COVID 19 PCR 12/21 negative   - CT Chest reporting multifocal PNA   - Procalcitonin level ordered   - Continue ertapenem with benadryl   - Patient takes benadryl daily when on antibiotics to prevent allergy, will continue this way for now. Needs to see allergist as outpatient.   - Patient is likely aspirating, would have swallow eval  - Follow up cultures  - Trend Fever  - Trend WBC      Thank you for allowing me to participate in the care of your patient.   Will Follow    Discussed treatment plan with: Dr Uriarte

## 2023-12-22 NOTE — SWALLOW BEDSIDE ASSESSMENT ADULT - SLP PERTINENT HISTORY OF CURRENT PROBLEM
As per MD note: "73-year-old female patient with a history of A-fib, COPD, TAVR,  tracheomalacia, aortic dissection, ostomy secondary to colorectal cancer, adrenal insufficiency, on steroids, presents to the ED complaining of nausea, vomiting and SOB. Pt is a poor historian and not able to provide a full history. Only reports not feeling well and worsening SOB with productive cough.  Per daughter over the phone, was not feeling well, was confused, with decrease appetite, not drinking, had an episode of vomiting.  In Ed, noted to be febrile with elevated WBC. "

## 2023-12-22 NOTE — SWALLOW BEDSIDE ASSESSMENT ADULT - SWALLOW EVAL: DIAGNOSIS
Oral phase of swallow WFL for puree, easy to chew and thin liquids, pt declining regular solids @ this time. Suspect pharyngeal dysphagia w/ thin liquids marked by cough post intake. No overt s/s of penetration/aspiration observed w/ puree, advanced solids or MILDLY thick liquids

## 2023-12-23 LAB
ALBUMIN SERPL ELPH-MCNC: 2.7 G/DL — LOW (ref 3.3–5.2)
ALBUMIN SERPL ELPH-MCNC: 2.7 G/DL — LOW (ref 3.3–5.2)
ALP SERPL-CCNC: 78 U/L — SIGNIFICANT CHANGE UP (ref 40–120)
ALP SERPL-CCNC: 78 U/L — SIGNIFICANT CHANGE UP (ref 40–120)
ALT FLD-CCNC: 9 U/L — SIGNIFICANT CHANGE UP
ALT FLD-CCNC: 9 U/L — SIGNIFICANT CHANGE UP
ANION GAP SERPL CALC-SCNC: 10 MMOL/L — SIGNIFICANT CHANGE UP (ref 5–17)
ANION GAP SERPL CALC-SCNC: 10 MMOL/L — SIGNIFICANT CHANGE UP (ref 5–17)
AST SERPL-CCNC: 16 U/L — SIGNIFICANT CHANGE UP
AST SERPL-CCNC: 16 U/L — SIGNIFICANT CHANGE UP
BILIRUB SERPL-MCNC: <0.2 MG/DL — LOW (ref 0.4–2)
BILIRUB SERPL-MCNC: <0.2 MG/DL — LOW (ref 0.4–2)
BUN SERPL-MCNC: 8.2 MG/DL — SIGNIFICANT CHANGE UP (ref 8–20)
BUN SERPL-MCNC: 8.2 MG/DL — SIGNIFICANT CHANGE UP (ref 8–20)
CALCIUM SERPL-MCNC: 8.4 MG/DL — SIGNIFICANT CHANGE UP (ref 8.4–10.5)
CALCIUM SERPL-MCNC: 8.4 MG/DL — SIGNIFICANT CHANGE UP (ref 8.4–10.5)
CHLORIDE SERPL-SCNC: 104 MMOL/L — SIGNIFICANT CHANGE UP (ref 96–108)
CHLORIDE SERPL-SCNC: 104 MMOL/L — SIGNIFICANT CHANGE UP (ref 96–108)
CO2 SERPL-SCNC: 29 MMOL/L — SIGNIFICANT CHANGE UP (ref 22–29)
CO2 SERPL-SCNC: 29 MMOL/L — SIGNIFICANT CHANGE UP (ref 22–29)
CREAT SERPL-MCNC: 0.54 MG/DL — SIGNIFICANT CHANGE UP (ref 0.5–1.3)
CREAT SERPL-MCNC: 0.54 MG/DL — SIGNIFICANT CHANGE UP (ref 0.5–1.3)
EGFR: 96 ML/MIN/1.73M2 — SIGNIFICANT CHANGE UP
EGFR: 96 ML/MIN/1.73M2 — SIGNIFICANT CHANGE UP
GLUCOSE BLDC GLUCOMTR-MCNC: 138 MG/DL — HIGH (ref 70–99)
GLUCOSE BLDC GLUCOMTR-MCNC: 138 MG/DL — HIGH (ref 70–99)
GLUCOSE BLDC GLUCOMTR-MCNC: 155 MG/DL — HIGH (ref 70–99)
GLUCOSE BLDC GLUCOMTR-MCNC: 155 MG/DL — HIGH (ref 70–99)
GLUCOSE BLDC GLUCOMTR-MCNC: 204 MG/DL — HIGH (ref 70–99)
GLUCOSE BLDC GLUCOMTR-MCNC: 204 MG/DL — HIGH (ref 70–99)
GLUCOSE BLDC GLUCOMTR-MCNC: 207 MG/DL — HIGH (ref 70–99)
GLUCOSE BLDC GLUCOMTR-MCNC: 207 MG/DL — HIGH (ref 70–99)
GLUCOSE SERPL-MCNC: 107 MG/DL — HIGH (ref 70–99)
GLUCOSE SERPL-MCNC: 107 MG/DL — HIGH (ref 70–99)
HCT VFR BLD CALC: 32.5 % — LOW (ref 34.5–45)
HCT VFR BLD CALC: 32.5 % — LOW (ref 34.5–45)
HGB BLD-MCNC: 8.9 G/DL — LOW (ref 11.5–15.5)
HGB BLD-MCNC: 8.9 G/DL — LOW (ref 11.5–15.5)
MAGNESIUM SERPL-MCNC: 1.9 MG/DL — SIGNIFICANT CHANGE UP (ref 1.6–2.6)
MAGNESIUM SERPL-MCNC: 1.9 MG/DL — SIGNIFICANT CHANGE UP (ref 1.6–2.6)
MCHC RBC-ENTMCNC: 18.3 PG — LOW (ref 27–34)
MCHC RBC-ENTMCNC: 18.3 PG — LOW (ref 27–34)
MCHC RBC-ENTMCNC: 27.4 GM/DL — LOW (ref 32–36)
MCHC RBC-ENTMCNC: 27.4 GM/DL — LOW (ref 32–36)
MCV RBC AUTO: 66.9 FL — LOW (ref 80–100)
MCV RBC AUTO: 66.9 FL — LOW (ref 80–100)
PHOSPHATE SERPL-MCNC: 2.3 MG/DL — LOW (ref 2.4–4.7)
PHOSPHATE SERPL-MCNC: 2.3 MG/DL — LOW (ref 2.4–4.7)
PLATELET # BLD AUTO: 271 K/UL — SIGNIFICANT CHANGE UP (ref 150–400)
PLATELET # BLD AUTO: 271 K/UL — SIGNIFICANT CHANGE UP (ref 150–400)
POTASSIUM SERPL-MCNC: 4 MMOL/L — SIGNIFICANT CHANGE UP (ref 3.5–5.3)
POTASSIUM SERPL-MCNC: 4 MMOL/L — SIGNIFICANT CHANGE UP (ref 3.5–5.3)
POTASSIUM SERPL-SCNC: 4 MMOL/L — SIGNIFICANT CHANGE UP (ref 3.5–5.3)
POTASSIUM SERPL-SCNC: 4 MMOL/L — SIGNIFICANT CHANGE UP (ref 3.5–5.3)
PROCALCITONIN SERPL-MCNC: 0.52 NG/ML — HIGH (ref 0.02–0.1)
PROCALCITONIN SERPL-MCNC: 0.52 NG/ML — HIGH (ref 0.02–0.1)
PROT SERPL-MCNC: 5.5 G/DL — LOW (ref 6.6–8.7)
PROT SERPL-MCNC: 5.5 G/DL — LOW (ref 6.6–8.7)
RBC # BLD: 4.86 M/UL — SIGNIFICANT CHANGE UP (ref 3.8–5.2)
RBC # BLD: 4.86 M/UL — SIGNIFICANT CHANGE UP (ref 3.8–5.2)
RBC # FLD: 21.8 % — HIGH (ref 10.3–14.5)
RBC # FLD: 21.8 % — HIGH (ref 10.3–14.5)
SODIUM SERPL-SCNC: 143 MMOL/L — SIGNIFICANT CHANGE UP (ref 135–145)
SODIUM SERPL-SCNC: 143 MMOL/L — SIGNIFICANT CHANGE UP (ref 135–145)
WBC # BLD: 10.08 K/UL — SIGNIFICANT CHANGE UP (ref 3.8–10.5)
WBC # BLD: 10.08 K/UL — SIGNIFICANT CHANGE UP (ref 3.8–10.5)
WBC # FLD AUTO: 10.08 K/UL — SIGNIFICANT CHANGE UP (ref 3.8–10.5)
WBC # FLD AUTO: 10.08 K/UL — SIGNIFICANT CHANGE UP (ref 3.8–10.5)

## 2023-12-23 PROCEDURE — 99233 SBSQ HOSP IP/OBS HIGH 50: CPT

## 2023-12-23 PROCEDURE — 99232 SBSQ HOSP IP/OBS MODERATE 35: CPT

## 2023-12-23 RX ORDER — LACTULOSE 10 G/15ML
10 SOLUTION ORAL DAILY
Refills: 0 | Status: DISCONTINUED | OUTPATIENT
Start: 2023-12-23 | End: 2023-12-28

## 2023-12-23 RX ORDER — INFLUENZA VIRUS VACCINE 15; 15; 15; 15 UG/.5ML; UG/.5ML; UG/.5ML; UG/.5ML
0.7 SUSPENSION INTRAMUSCULAR ONCE
Refills: 0 | Status: DISCONTINUED | OUTPATIENT
Start: 2023-12-23 | End: 2023-12-28

## 2023-12-23 RX ADMIN — APIXABAN 5 MILLIGRAM(S): 2.5 TABLET, FILM COATED ORAL at 17:44

## 2023-12-23 RX ADMIN — ATORVASTATIN CALCIUM 20 MILLIGRAM(S): 80 TABLET, FILM COATED ORAL at 21:28

## 2023-12-23 RX ADMIN — FAMOTIDINE 20 MILLIGRAM(S): 10 INJECTION INTRAVENOUS at 11:14

## 2023-12-23 RX ADMIN — Medication 8 MILLIGRAM(S): at 05:38

## 2023-12-23 RX ADMIN — Medication 5 UNIT(S): at 13:09

## 2023-12-23 RX ADMIN — MEXILETINE HYDROCHLORIDE 200 MILLIGRAM(S): 150 CAPSULE ORAL at 05:38

## 2023-12-23 RX ADMIN — Medication 2: at 13:09

## 2023-12-23 RX ADMIN — APIXABAN 5 MILLIGRAM(S): 2.5 TABLET, FILM COATED ORAL at 05:38

## 2023-12-23 RX ADMIN — MEXILETINE HYDROCHLORIDE 200 MILLIGRAM(S): 150 CAPSULE ORAL at 13:08

## 2023-12-23 RX ADMIN — Medication 3 MILLILITER(S): at 09:05

## 2023-12-23 RX ADMIN — Medication 1200 MILLIGRAM(S): at 05:38

## 2023-12-23 RX ADMIN — Medication 5 UNIT(S): at 17:43

## 2023-12-23 RX ADMIN — GABAPENTIN 100 MILLIGRAM(S): 400 CAPSULE ORAL at 21:28

## 2023-12-23 RX ADMIN — Medication 2: at 17:44

## 2023-12-23 RX ADMIN — MONTELUKAST 10 MILLIGRAM(S): 4 TABLET, CHEWABLE ORAL at 11:14

## 2023-12-23 RX ADMIN — ERTAPENEM SODIUM 120 MILLIGRAM(S): 1 INJECTION, POWDER, LYOPHILIZED, FOR SOLUTION INTRAMUSCULAR; INTRAVENOUS at 05:39

## 2023-12-23 RX ADMIN — GABAPENTIN 100 MILLIGRAM(S): 400 CAPSULE ORAL at 05:38

## 2023-12-23 RX ADMIN — INSULIN GLARGINE 16 UNIT(S): 100 INJECTION, SOLUTION SUBCUTANEOUS at 21:28

## 2023-12-23 RX ADMIN — LEVETIRACETAM 500 MILLIGRAM(S): 250 TABLET, FILM COATED ORAL at 17:44

## 2023-12-23 RX ADMIN — Medication 50 MILLIGRAM(S): at 06:19

## 2023-12-23 RX ADMIN — GABAPENTIN 100 MILLIGRAM(S): 400 CAPSULE ORAL at 13:08

## 2023-12-23 RX ADMIN — LACTULOSE 10 GRAM(S): 10 SOLUTION ORAL at 17:52

## 2023-12-23 RX ADMIN — Medication 3 MILLILITER(S): at 14:57

## 2023-12-23 RX ADMIN — TIOTROPIUM BROMIDE 2 PUFF(S): 18 CAPSULE ORAL; RESPIRATORY (INHALATION) at 09:05

## 2023-12-23 RX ADMIN — LEVETIRACETAM 500 MILLIGRAM(S): 250 TABLET, FILM COATED ORAL at 05:38

## 2023-12-23 RX ADMIN — Medication 1200 MILLIGRAM(S): at 17:44

## 2023-12-23 RX ADMIN — CLOPIDOGREL BISULFATE 75 MILLIGRAM(S): 75 TABLET, FILM COATED ORAL at 11:14

## 2023-12-23 RX ADMIN — Medication 63.75 MILLIMOLE(S): at 11:13

## 2023-12-23 RX ADMIN — Medication 3 MILLILITER(S): at 20:34

## 2023-12-23 RX ADMIN — Medication 5 UNIT(S): at 09:10

## 2023-12-23 RX ADMIN — MEXILETINE HYDROCHLORIDE 200 MILLIGRAM(S): 150 CAPSULE ORAL at 21:28

## 2023-12-23 RX ADMIN — Medication 3 MILLILITER(S): at 04:28

## 2023-12-23 NOTE — PROGRESS NOTE ADULT - ASSESSMENT
73y  Female with h/o A-fib, COPD, TAVR, tracheomalacia, aortic dissection, ostomy secondary to colorectal cancer, adrenal insufficiency, on steroids. Patient presents to the ED complaining of nausea, vomiting and SOB. Patient reports not feeling well, unable to elaborate on her symptoms. In the ER patient is febrile to 101.9F, leukocytosis. Started on Ertapenem.      Aspiration PNA  Fever  Leukocytosis   Multiple antibiotic allergies       - Blood cultures 12/21 no growth   - RVP/COVID 19 PCR 12/21 negative   - CT Chest reporting multifocal PNA   - Procalcitonin level 0.52  - Continue ertapenem with benadryl till 12/25/23  - Patient takes benadryl daily when on antibiotics to prevent allergy, will continue this way for now. Needs to see allergist as outpatient.   - swallow eval noted   - Follow up cultures  - Trend Fever  - Trend WBC      Will sign off. Please call PRN.      Discussed treatment plan with: Dr Dolan

## 2023-12-23 NOTE — PATIENT PROFILE ADULT - FUNCTIONAL ASSESSMENT - BASIC MOBILITY 6.
4-calculated by average/Not able to assess (calculate score using [facility] averaging method) 4-calculated by average/Not able to assess (calculate score using [facility] averaging method)

## 2023-12-23 NOTE — PATIENT PROFILE ADULT - FALL HARM RISK - HARM RISK INTERVENTIONS
Assistance with ambulation/Assistance OOB with selected safe patient handling equipment/Communicate Risk of Fall with Harm to all staff/Discuss with provider need for PT consult/Monitor gait and stability/Provide patient with walking aids - walker, cane, crutches/Reinforce activity limits and safety measures with patient and family/Tailored Fall Risk Interventions/Visual Cue: Yellow wristband and red socks/Bed in lowest position, wheels locked, appropriate side rails in place/Call bell, personal items and telephone in reach/Instruct patient to call for assistance before getting out of bed or chair/Non-slip footwear when patient is out of bed/[place] to call system/Physically safe environment - no spills, clutter or unnecessary equipment/Purposeful Proactive Rounding/Room/bathroom lighting operational, light cord in reach Assistance with ambulation/Assistance OOB with selected safe patient handling equipment/Communicate Risk of Fall with Harm to all staff/Discuss with provider need for PT consult/Monitor gait and stability/Provide patient with walking aids - walker, cane, crutches/Reinforce activity limits and safety measures with patient and family/Tailored Fall Risk Interventions/Visual Cue: Yellow wristband and red socks/Bed in lowest position, wheels locked, appropriate side rails in place/Call bell, personal items and telephone in reach/Instruct patient to call for assistance before getting out of bed or chair/Non-slip footwear when patient is out of bed/[place] to call system/Physically safe environment - no spills, clutter or unnecessary equipment/Purposeful Proactive Rounding/Room/bathroom lighting operational, light cord in reach

## 2023-12-23 NOTE — PROGRESS NOTE ADULT - ASSESSMENT
73-year-old female patient with a history of A-fib, COPD, TAVR,  tracheomalacia, aortic dissection, ostomy secondary to colorectal cancer, adrenal insufficiency, on steroids, presents to the ED complaining of nausea, vomiting and SOB.  Pt is a poor historian and not able to provide a full history. Only reports not feeling well and worsening SOB with productive cough   Per daughter over the phone, was not feeling well, was confused, with decrease appetite, not drinking, had an episode of vomiting. In Ed, was febrile with elevated WBC, found to be septic.     #Sepsis secondary to multifocal PNA and likely COPD exacerbation   - Febrile, elevated WBC   - Pt with extensive allergies to antibiotics   - c/w Ertapenem + diphenhydramine 50mg IV daily  - c/w tiotropium 2.5 micrograms 2 puff daily  - c/w albuterol/ipratropium 3 mL q6   - c/w Mucinex 1200 oral q12  - c/w methylprednisolone 8 mg daily  - c/w montelukast 10 mg dialy  - c/w Zofran 4mg q6  - ID consulted    #Chronic afib   - c/w Eliquis 5mg q12  - c/w Mexiletine 200 mg q8     #Type 2 DM with neuropathy  a1c ~9  - c/w Lantus 16 units and Admelog 5 TID   - c/w Gabapentin 100 mg q8     #CAD   - c/w Plavix 75mg daily  - c/w atorvastatin 20 mg at bedtime    #Seizures   -c/w levetiracetam 500mg BID    S/p Colostomy   c/w Senna daily     Dispo: IV Abx through 12/25

## 2023-12-23 NOTE — PROGRESS NOTE ADULT - SUBJECTIVE AND OBJECTIVE BOX
"[facility] [[id number]]  Chief Complaint   Patient presents with     RECHECK     5 week follow up     Initial Ht 2' 3.28\" (69.3 cm)   Wt 16 lb 4 oz (7.37 kg)   HC 40 cm (15.75\")   BMI 15.35 kg/m   Estimated body mass index is 15.35 kg/m  as calculated from the following:    Height as of this encounter: 2' 3.28\" (69.3 cm).    Weight as of this encounter: 16 lb 4 oz (7.37 kg).  Medication Reconciliation: complete      " [facility] Division of Hospital Medicine    SUBJECTIVE / OVERNIGHT EVENTS:  No events    Patient denies chest pain, SOB, abd pain, N/V, fever, chills, dysuria or any other complaints. All remainder ROS negative.     MEDICATIONS  (STANDING):  albuterol/ipratropium for Nebulization 3 milliLiter(s) Nebulizer every 6 hours  apixaban 5 milliGRAM(s) Oral every 12 hours  atorvastatin 20 milliGRAM(s) Oral at bedtime  clopidogrel Tablet 75 milliGRAM(s) Oral daily  dextrose 5%. 1000 milliLiter(s) (100 mL/Hr) IV Continuous <Continuous>  dextrose 5%. 1000 milliLiter(s) (50 mL/Hr) IV Continuous <Continuous>  dextrose 50% Injectable 25 Gram(s) IV Push once  dextrose 50% Injectable 12.5 Gram(s) IV Push once  dextrose 50% Injectable 25 Gram(s) IV Push once  diphenhydrAMINE Injectable 50 milliGRAM(s) IV Push daily  ertapenem  IVPB 1000 milliGRAM(s) IV Intermittent every 24 hours  famotidine    Tablet 20 milliGRAM(s) Oral daily  gabapentin 100 milliGRAM(s) Oral every 8 hours  glucagon  Injectable 1 milliGRAM(s) IntraMuscular once  guaiFENesin ER 1200 milliGRAM(s) Oral every 12 hours  influenza  Vaccine (HIGH DOSE) 0.7 milliLiter(s) IntraMuscular once  insulin glargine Injectable (LANTUS) 16 Unit(s) SubCutaneous at bedtime  insulin lispro (ADMELOG) corrective regimen sliding scale   SubCutaneous three times a day before meals  insulin lispro Injectable (ADMELOG) 5 Unit(s) SubCutaneous three times a day before meals  levETIRAcetam 500 milliGRAM(s) Oral two times a day  methylPREDNISolone 8 milliGRAM(s) Oral daily  mexiletine 200 milliGRAM(s) Oral every 8 hours  montelukast 10 milliGRAM(s) Oral daily  sodium chloride 0.9%. 1000 milliLiter(s) (100 mL/Hr) IV Continuous <Continuous>  tiotropium 2.5 MICROgram(s) Inhaler 2 Puff(s) Inhalation daily    MEDICATIONS  (PRN):  dextrose Oral Gel 15 Gram(s) Oral once PRN Blood Glucose LESS THAN 70 milliGRAM(s)/deciliter  ondansetron    Tablet 4 milliGRAM(s) Oral every 6 hours PRN for nausea        I&O's Summary    22 Dec 2023 07:01  -  23 Dec 2023 07:00  --------------------------------------------------------  IN: 0 mL / OUT: 1200 mL / NET: -1200 mL        PHYSICAL EXAM:  Vital Signs Last 24 Hrs  T(C): 37.2 (23 Dec 2023 04:35), Max: 37.2 (22 Dec 2023 20:00)  T(F): 99 (23 Dec 2023 04:35), Max: 99 (22 Dec 2023 20:00)  HR: 65 (23 Dec 2023 09:18) (65 - 79)  BP: 123/65 (23 Dec 2023 04:35) (115/70 - 143/76)  BP(mean): --  RR: 18 (23 Dec 2023 04:35) (18 - 18)  SpO2: 94% (23 Dec 2023 09:18) (94% - 99%)    Parameters below as of 23 Dec 2023 09:18  Patient On (Oxygen Delivery Method): nasal cannula            CONSTITUTIONAL: NAD, appears stated age  ENMT: Moist oral mucosa, no pharyngeal injection or exudates; normal dentition  RESPIRATORY: Normal respiratory effort; clear to auscultation bilaterally  CARDIOVASCULAR: Regular rate and rhythm, normal S1 and S2, no murmur/rub/gallop; Peripheral pulses are 2+ bilaterally  ABDOMEN: Nontender to palpation, normoactive bowel sounds, no rebound/guarding;   MUSCLOSKELETAL:  No clubbing or cyanosis of digits; no joint swelling or tenderness to palpation  PSYCH: A+O to person, place, and time; affect appropriate  NEUROLOGY: CN 2-12 are intact and symmetric; no gross sensory deficits;   SKIN: No rashes; no palpable lesions    LABS:                        8.9    10.08 )-----------( 271      ( 23 Dec 2023 04:44 )             32.5     12-23    143  |  104  |  8.2  ----------------------------<  107<H>  4.0   |  29.0  |  0.54    Ca    8.4      23 Dec 2023 04:44  Phos  2.3     12-23  Mg     1.9     12-23    TPro  5.5<L>  /  Alb  2.7<L>  /  TBili  <0.2<L>  /  DBili  x   /  AST  16  /  ALT  9   /  AlkPhos  78  12-23          Urinalysis Basic - ( 23 Dec 2023 04:44 )    Color: x / Appearance: x / SG: x / pH: x  Gluc: 107 mg/dL / Ketone: x  / Bili: x / Urobili: x   Blood: x / Protein: x / Nitrite: x   Leuk Esterase: x / RBC: x / WBC x   Sq Epi: x / Non Sq Epi: x / Bacteria: x        Culture - Blood (collected 21 Dec 2023 12:00)  Source: .Blood Blood-Peripheral  Preliminary Report (22 Dec 2023 16:02):    No growth at 24 hours    Culture - Blood (collected 21 Dec 2023 11:50)  Source: .Blood Blood-Peripheral  Preliminary Report (22 Dec 2023 16:02):    No growth at 24 hours      CAPILLARY BLOOD GLUCOSE      POCT Blood Glucose.: 207 mg/dL (23 Dec 2023 13:00)  POCT Blood Glucose.: 155 mg/dL (23 Dec 2023 08:55)  POCT Blood Glucose.: 209 mg/dL (22 Dec 2023 21:58)  POCT Blood Glucose.: 227 mg/dL (22 Dec 2023 18:11)        RADIOLOGY & ADDITIONAL TESTS:  Results Reviewed:   Imaging Personally Reviewed:  Electrocardiogram Personally Reviewed:                                           [facility] Division of Hospital Medicine    SUBJECTIVE / OVERNIGHT EVENTS:  No events    Patient denies chest pain, SOB, abd pain, N/V, fever, chills, dysuria or any other complaints. All remainder ROS negative.     MEDICATIONS  (STANDING):  albuterol/ipratropium for Nebulization 3 milliLiter(s) Nebulizer every 6 hours  apixaban 5 milliGRAM(s) Oral every 12 hours  atorvastatin 20 milliGRAM(s) Oral at bedtime  clopidogrel Tablet 75 milliGRAM(s) Oral daily  dextrose 5%. 1000 milliLiter(s) (100 mL/Hr) IV Continuous <Continuous>  dextrose 5%. 1000 milliLiter(s) (50 mL/Hr) IV Continuous <Continuous>  dextrose 50% Injectable 25 Gram(s) IV Push once  dextrose 50% Injectable 12.5 Gram(s) IV Push once  dextrose 50% Injectable 25 Gram(s) IV Push once  diphenhydrAMINE Injectable 50 milliGRAM(s) IV Push daily  ertapenem  IVPB 1000 milliGRAM(s) IV Intermittent every 24 hours  famotidine    Tablet 20 milliGRAM(s) Oral daily  gabapentin 100 milliGRAM(s) Oral every 8 hours  glucagon  Injectable 1 milliGRAM(s) IntraMuscular once  guaiFENesin ER 1200 milliGRAM(s) Oral every 12 hours  influenza  Vaccine (HIGH DOSE) 0.7 milliLiter(s) IntraMuscular once  insulin glargine Injectable (LANTUS) 16 Unit(s) SubCutaneous at bedtime  insulin lispro (ADMELOG) corrective regimen sliding scale   SubCutaneous three times a day before meals  insulin lispro Injectable (ADMELOG) 5 Unit(s) SubCutaneous three times a day before meals  levETIRAcetam 500 milliGRAM(s) Oral two times a day  methylPREDNISolone 8 milliGRAM(s) Oral daily  mexiletine 200 milliGRAM(s) Oral every 8 hours  montelukast 10 milliGRAM(s) Oral daily  sodium chloride 0.9%. 1000 milliLiter(s) (100 mL/Hr) IV Continuous <Continuous>  tiotropium 2.5 MICROgram(s) Inhaler 2 Puff(s) Inhalation daily    MEDICATIONS  (PRN):  dextrose Oral Gel 15 Gram(s) Oral once PRN Blood Glucose LESS THAN 70 milliGRAM(s)/deciliter  ondansetron    Tablet 4 milliGRAM(s) Oral every 6 hours PRN for nausea        I&O's Summary    22 Dec 2023 07:01  -  23 Dec 2023 07:00  --------------------------------------------------------  IN: 0 mL / OUT: 1200 mL / NET: -1200 mL        PHYSICAL EXAM:  Vital Signs Last 24 Hrs  T(C): 37.2 (23 Dec 2023 04:35), Max: 37.2 (22 Dec 2023 20:00)  T(F): 99 (23 Dec 2023 04:35), Max: 99 (22 Dec 2023 20:00)  HR: 65 (23 Dec 2023 09:18) (65 - 79)  BP: 123/65 (23 Dec 2023 04:35) (115/70 - 143/76)  BP(mean): --  RR: 18 (23 Dec 2023 04:35) (18 - 18)  SpO2: 94% (23 Dec 2023 09:18) (94% - 99%)    Parameters below as of 23 Dec 2023 09:18  Patient On (Oxygen Delivery Method): nasal cannula            CONSTITUTIONAL: NAD, appears stated age  ENMT: Moist oral mucosa, no pharyngeal injection or exudates; normal dentition  RESPIRATORY: Normal respiratory effort; clear to auscultation bilaterally  CARDIOVASCULAR: Regular rate and rhythm, normal S1 and S2, no murmur/rub/gallop; Peripheral pulses are 2+ bilaterally  ABDOMEN: Nontender to palpation, normoactive bowel sounds, no rebound/guarding;   MUSCLOSKELETAL:  No clubbing or cyanosis of digits; no joint swelling or tenderness to palpation  PSYCH: A+O to person, place, and time; affect appropriate  NEUROLOGY: CN 2-12 are intact and symmetric; no gross sensory deficits;   SKIN: No rashes; no palpable lesions    LABS:                        8.9    10.08 )-----------( 271      ( 23 Dec 2023 04:44 )             32.5     12-23    143  |  104  |  8.2  ----------------------------<  107<H>  4.0   |  29.0  |  0.54    Ca    8.4      23 Dec 2023 04:44  Phos  2.3     12-23  Mg     1.9     12-23    TPro  5.5<L>  /  Alb  2.7<L>  /  TBili  <0.2<L>  /  DBili  x   /  AST  16  /  ALT  9   /  AlkPhos  78  12-23          Urinalysis Basic - ( 23 Dec 2023 04:44 )    Color: x / Appearance: x / SG: x / pH: x  Gluc: 107 mg/dL / Ketone: x  / Bili: x / Urobili: x   Blood: x / Protein: x / Nitrite: x   Leuk Esterase: x / RBC: x / WBC x   Sq Epi: x / Non Sq Epi: x / Bacteria: x        Culture - Blood (collected 21 Dec 2023 12:00)  Source: .Blood Blood-Peripheral  Preliminary Report (22 Dec 2023 16:02):    No growth at 24 hours    Culture - Blood (collected 21 Dec 2023 11:50)  Source: .Blood Blood-Peripheral  Preliminary Report (22 Dec 2023 16:02):    No growth at 24 hours      CAPILLARY BLOOD GLUCOSE      POCT Blood Glucose.: 207 mg/dL (23 Dec 2023 13:00)  POCT Blood Glucose.: 155 mg/dL (23 Dec 2023 08:55)  POCT Blood Glucose.: 209 mg/dL (22 Dec 2023 21:58)  POCT Blood Glucose.: 227 mg/dL (22 Dec 2023 18:11)        RADIOLOGY & ADDITIONAL TESTS:  Results Reviewed:   Imaging Personally Reviewed:  Electrocardiogram Personally Reviewed:

## 2023-12-23 NOTE — PROGRESS NOTE ADULT - SUBJECTIVE AND OBJECTIVE BOX
[facility]  INFECTIOUS DISEASES at [place] / [place] / [place]  =======================================================                               Allen Ibanez MD#   Iam Benjamin MD*                             Opal Da Silva MD*   Michelle Dowell MD*                              Professor Emeritus:  Dr Choco Hewitt MD^            Diplomates American Board of Internal Medicine & Infectious Diseases                # [place] Office - Appt - Tel  [phone] Fax [phone]                * [place] Office - Appt - Tel [phone] Fax [phone]                      ^[place] Office - Tel  [phone] Fax [phone]                                  Hospital Consult line:  [phone]  =======================================================    RAYRAY RODRIGUZE [id number]    Follow up: aspiration PNA    No fevers since 12/21      Allergies:  aspirin (Short breath)  Dilaudid (Short breath)  tetanus toxoid (Short breath)  Avelox (Short breath; Pruritus)  Valium (Short breath)  codeine (Short breath)  shellfish (Anaphylaxis)  cefepime (Anaphylaxis)  penicillin (Anaphylaxis)  iodine (Short breath; Swelling)      REVIEW OF SYSTEMS:  Unable to to assess. Patient is confused and not answering questions appropriately       Physical Exam:  GEN: NAD  HEENT: normocephalic and atraumatic.   NECK: Supple.   LUNGS: CTA B/L   HEART: RRR  ABDOMEN: Soft, NT, ND.  +BS.    : No CVA tenderness  EXTREMITIES: Without  edema.  MSK: No joint swelling  NEUROLOGIC: confused   SKIN: No rash      Vitals:  T(F): 99 (23 Dec 2023 04:35), Max: 99 (22 Dec 2023 20:00)  HR: 65 (23 Dec 2023 09:18)  BP: 123/65 (23 Dec 2023 04:35)  RR: 18 (23 Dec 2023 04:35)  SpO2: 94% (23 Dec 2023 09:18) (94% - 99%)  temp max in last 48H T(F): , Max: 101.9 (12-21-23 @ 10:10)      Current Antibiotics:  ertapenem  IVPB 1000 milliGRAM(s) IV Intermittent every 24 hours    Other medications:  albuterol/ipratropium for Nebulization 3 milliLiter(s) Nebulizer every 6 hours  apixaban 5 milliGRAM(s) Oral every 12 hours  atorvastatin 20 milliGRAM(s) Oral at bedtime  clopidogrel Tablet 75 milliGRAM(s) Oral daily  dextrose 5%. 1000 milliLiter(s) IV Continuous <Continuous>  dextrose 5%. 1000 milliLiter(s) IV Continuous <Continuous>  dextrose 50% Injectable 25 Gram(s) IV Push once  dextrose 50% Injectable 12.5 Gram(s) IV Push once  dextrose 50% Injectable 25 Gram(s) IV Push once  diphenhydrAMINE Injectable 50 milliGRAM(s) IV Push daily  famotidine    Tablet 20 milliGRAM(s) Oral daily  gabapentin 100 milliGRAM(s) Oral every 8 hours  glucagon  Injectable 1 milliGRAM(s) IntraMuscular once  guaiFENesin ER 1200 milliGRAM(s) Oral every 12 hours  influenza  Vaccine (HIGH DOSE) 0.7 milliLiter(s) IntraMuscular once  insulin glargine Injectable (LANTUS) 16 Unit(s) SubCutaneous at bedtime  insulin lispro (ADMELOG) corrective regimen sliding scale   SubCutaneous three times a day before meals  insulin lispro Injectable (ADMELOG) 5 Unit(s) SubCutaneous three times a day before meals  levETIRAcetam 500 milliGRAM(s) Oral two times a day  methylPREDNISolone 8 milliGRAM(s) Oral daily  mexiletine 200 milliGRAM(s) Oral every 8 hours  montelukast 10 milliGRAM(s) Oral daily  sodium chloride 0.9%. 1000 milliLiter(s) IV Continuous <Continuous>  sodium phosphate 15 milliMole(s)/250 mL IVPB 15 milliMole(s) IV Intermittent once  tiotropium 2.5 MICROgram(s) Inhaler 2 Puff(s) Inhalation daily                            8.9    10.08 )-----------( 271      ( 23 Dec 2023 04:44 )             32.5     12-23    143  |  104  |  8.2  ----------------------------<  107<H>  4.0   |  29.0  |  0.54    Ca    8.4      23 Dec 2023 04:44  Phos  2.3     12-23  Mg     1.9     12-23    TPro  5.5<L>  /  Alb  2.7<L>  /  TBili  <0.2<L>  /  DBili  x   /  AST  16  /  ALT  9   /  AlkPhos  78  12-23    RECENT CULTURES:  12-21 @ 17:07    RVP  NotDetec    12-21 @ 12:00 .Blood Blood-Peripheral     No growth at 24 hours    12-21 @ 11:50 .Blood Blood-Peripheral     No growth at 24 hours      WBC Count: 10.08 K/uL (12-23-23 @ 04:44)  WBC Count: 24.77 K/uL (12-21-23 @ 12:00)    Creatinine: 0.54 mg/dL (12-23-23 @ 04:44)  Creatinine: 0.94 mg/dL (12-21-23 @ 12:00)    Procalcitonin, Serum: 0.52 ng/mL (12-23-23 @ 04:44)     SARS-CoV-2: NotDetec (12-21-23 @ 17:07)  SARS-CoV-2 Result: NotDete (12-21-23 @ 12:00)                 [facility]  INFECTIOUS DISEASES at [place] / [place] / [place]  =======================================================                               Allen Ibanez MD#   Iam Benjamin MD*                             Opal Da Silva MD*   Michelle Dowell MD*                              Professor Emeritus:  Dr Choco Hewitt MD^            Diplomates American Board of Internal Medicine & Infectious Diseases                # [place] Office - Appt - Tel  [phone] Fax [phone]                * [place] Office - Appt - Tel [phone] Fax [phone]                      ^[place] Office - Tel  [phone] Fax [phone]                                  Hospital Consult line:  [phone]  =======================================================    RAYRAY RODRIGUEZ [id number]    Follow up: aspiration PNA    No fevers since 12/21      Allergies:  aspirin (Short breath)  Dilaudid (Short breath)  tetanus toxoid (Short breath)  Avelox (Short breath; Pruritus)  Valium (Short breath)  codeine (Short breath)  shellfish (Anaphylaxis)  cefepime (Anaphylaxis)  penicillin (Anaphylaxis)  iodine (Short breath; Swelling)      REVIEW OF SYSTEMS:  Unable to to assess. Patient is confused and not answering questions appropriately       Physical Exam:  GEN: NAD  HEENT: normocephalic and atraumatic.   NECK: Supple.   LUNGS: CTA B/L   HEART: RRR  ABDOMEN: Soft, NT, ND.  +BS.    : No CVA tenderness  EXTREMITIES: Without  edema.  MSK: No joint swelling  NEUROLOGIC: confused   SKIN: No rash      Vitals:  T(F): 99 (23 Dec 2023 04:35), Max: 99 (22 Dec 2023 20:00)  HR: 65 (23 Dec 2023 09:18)  BP: 123/65 (23 Dec 2023 04:35)  RR: 18 (23 Dec 2023 04:35)  SpO2: 94% (23 Dec 2023 09:18) (94% - 99%)  temp max in last 48H T(F): , Max: 101.9 (12-21-23 @ 10:10)      Current Antibiotics:  ertapenem  IVPB 1000 milliGRAM(s) IV Intermittent every 24 hours    Other medications:  albuterol/ipratropium for Nebulization 3 milliLiter(s) Nebulizer every 6 hours  apixaban 5 milliGRAM(s) Oral every 12 hours  atorvastatin 20 milliGRAM(s) Oral at bedtime  clopidogrel Tablet 75 milliGRAM(s) Oral daily  dextrose 5%. 1000 milliLiter(s) IV Continuous <Continuous>  dextrose 5%. 1000 milliLiter(s) IV Continuous <Continuous>  dextrose 50% Injectable 25 Gram(s) IV Push once  dextrose 50% Injectable 12.5 Gram(s) IV Push once  dextrose 50% Injectable 25 Gram(s) IV Push once  diphenhydrAMINE Injectable 50 milliGRAM(s) IV Push daily  famotidine    Tablet 20 milliGRAM(s) Oral daily  gabapentin 100 milliGRAM(s) Oral every 8 hours  glucagon  Injectable 1 milliGRAM(s) IntraMuscular once  guaiFENesin ER 1200 milliGRAM(s) Oral every 12 hours  influenza  Vaccine (HIGH DOSE) 0.7 milliLiter(s) IntraMuscular once  insulin glargine Injectable (LANTUS) 16 Unit(s) SubCutaneous at bedtime  insulin lispro (ADMELOG) corrective regimen sliding scale   SubCutaneous three times a day before meals  insulin lispro Injectable (ADMELOG) 5 Unit(s) SubCutaneous three times a day before meals  levETIRAcetam 500 milliGRAM(s) Oral two times a day  methylPREDNISolone 8 milliGRAM(s) Oral daily  mexiletine 200 milliGRAM(s) Oral every 8 hours  montelukast 10 milliGRAM(s) Oral daily  sodium chloride 0.9%. 1000 milliLiter(s) IV Continuous <Continuous>  sodium phosphate 15 milliMole(s)/250 mL IVPB 15 milliMole(s) IV Intermittent once  tiotropium 2.5 MICROgram(s) Inhaler 2 Puff(s) Inhalation daily                            8.9    10.08 )-----------( 271      ( 23 Dec 2023 04:44 )             32.5     12-23    143  |  104  |  8.2  ----------------------------<  107<H>  4.0   |  29.0  |  0.54    Ca    8.4      23 Dec 2023 04:44  Phos  2.3     12-23  Mg     1.9     12-23    TPro  5.5<L>  /  Alb  2.7<L>  /  TBili  <0.2<L>  /  DBili  x   /  AST  16  /  ALT  9   /  AlkPhos  78  12-23    RECENT CULTURES:  12-21 @ 17:07    RVP  NotDetec    12-21 @ 12:00 .Blood Blood-Peripheral     No growth at 24 hours    12-21 @ 11:50 .Blood Blood-Peripheral     No growth at 24 hours      WBC Count: 10.08 K/uL (12-23-23 @ 04:44)  WBC Count: 24.77 K/uL (12-21-23 @ 12:00)    Creatinine: 0.54 mg/dL (12-23-23 @ 04:44)  Creatinine: 0.94 mg/dL (12-21-23 @ 12:00)    Procalcitonin, Serum: 0.52 ng/mL (12-23-23 @ 04:44)     SARS-CoV-2: NotDetec (12-21-23 @ 17:07)  SARS-CoV-2 Result: NotDete (12-21-23 @ 12:00)

## 2023-12-23 NOTE — PATIENT PROFILE ADULT - PATIENT REPRESENTATIVE: ( YOU CAN CHOOSE ANY PERSON THAT CAN ASSIST YOU WITH YOUR HEALTH CARE PREFERENCES, DOES NOT HAVE TO BE A SPOUSE, IMMEDIATE FAMILY OR SIGNIFICANT OTHER/PARTNER)
Encounter Date: 5/7/2019    SCRIBE #1 NOTE: I, Magali Teague and am scribing for, and in the presence of, Benton Ruiz MD.       History     Chief Complaint   Patient presents with    Fatigue     Pt has been fasting for labs. States she felt weak and as if she was going to pass out.  Brought to ED by op nurse for eval. Pt drinking  a coke     Time seen by provider: 12:32 PM on 05/07/2019    Marleen Samano is a 72 y.o. female with PMHx of depression, COPD, emphysema, and arthritis who presents to the ED with an onset of weakness starting 3 months ago but acutely worsening just PTA. She additionally complains of nausea, chronic SOB, and chronic constipation. The patient was coming to the hospital today to get some blood work done, but the lab sent her to the ED due to her weakness. The patient reports that about a month ago she had tests done that showed she had low iron levels, and beginning to take an iron supplement following this result improved her condition. She reports a recent increase in her dosage of benazepril. The patient denies fainting, fever, vomiting, wheezing, cough, CP, abd pain, painful urination, blood in urine, diarrhea, or any other symptoms at this time. Patient's PSHx includes neck surgery. Drug allergies to Artane, Ned-1, Adhesive Tape-silicones, Latex, and Morphine noted.    The history is provided by the patient.     Review of patient's allergies indicates:   Allergen Reactions    Artane Other (See Comments)     Swelling    Ned-1 Swelling     Oragel caused tongue to swell    Adhesive tape-silicones Rash     Blisters after on for several hours    Latex, natural rubber Hives and Rash     Localized    Latex     Morphine Itching and Hives     Past Medical History:   Diagnosis Date    Arthritis     Bell's palsy     Bladder incontinence     Blepharospasm     Cervical dystonia     Depression     Hormone deficiency     Myofacial pain syndrome      Past Surgical History:    Procedure Laterality Date    APPENDECTOMY      ARTHROPLASTY-KNEE Right 7/15/2014    [facility]    BREAST LUMPECTOMY      COLONOSCOPY N/A 8/16/2012    Per[facility] by Abeba [facility]ling [facility] at [facility] ENDO    EYE SURGERY      HYSTERECTOMY      NECK SURGERY      TONSILLECTOMY       Family History   Problem Relation Age of Onset    Diabetes Neg Hx     Melanoma Neg Hx     Psoriasis Neg Hx     Lupus Neg Hx     Eczema Neg Hx      Social History     Tobacco Use    Smoking status: Never Smoker    Smokeless tobacco: Never Used   Substance Use Topics    Alcohol use: No    Drug use: No     Review of Systems   Constitutional: Negative for activity change, diaphoresis and fever.   HENT: Negative for ear pain, rhinorrhea, sore throat and trouble swallowing.    Eyes: Negative for pain and visual disturbance.   Respiratory: Positive for shortness of breath (chronic). Negative for cough, wheezing and stridor.    Cardiovascular: Negative for chest pain.   Gastrointestinal: Positive for constipation (chronic) and nausea. Negative for abdominal pain, blood in stool, diarrhea and vomiting.   Genitourinary: Negative for dysuria, hematuria, vaginal bleeding and vaginal discharge.   Musculoskeletal: Negative for gait problem.   Skin: Negative for rash.   Neurological: Positive for weakness. Negative for seizures, syncope and headaches.   Psychiatric/Behavioral: Negative for hallucinations and suicidal ideas.       Physical Exam     Initial Vitals [05/07/19 1048]   BP Pulse Resp Temp SpO2   (!) 163/90 85 18 98.2 °F (36.8 °C) 99 %      MAP       --         Physical Exam    Nursing note and vitals reviewed.  Constitutional: She appears well-developed. No distress.   HENT:   Head: Normocephalic and atraumatic.   Nose: Nose normal.   Eyes: EOM are normal.   Neck: Normal range of motion. Neck supple.   Cardiovascular: Normal rate, regular rhythm, normal heart sounds and intact distal pulses. Exam  reveals no gallop and no friction rub.    No murmur heard.  Pulmonary/Chest: Breath sounds normal. No stridor. No respiratory distress. She has no wheezes. She has no rhonchi. She has no rales.   Abdominal: Soft. Bowel sounds are normal. There is no tenderness.   Musculoskeletal: Normal range of motion.   Neurological: She is alert and oriented to person, place, and time. No cranial nerve deficit.   Cranial nerves II through XII grossly intact. 5/5 motor strength to all 4 extremities. Sensation is intact. Finger-to-nose intact. Speech and cognition is normal. No focal neurologic deficit.   Skin: Skin is warm and dry. No rash noted.   Psychiatric: She has a normal mood and affect.         ED Course   Procedures  Labs Reviewed   CBC W/ AUTO DIFFERENTIAL - Abnormal; Notable for the following components:       Result Value    Hemoglobin 11.7 (*)     Mean Corpuscular Hemoglobin 26.0 (*)     Mean Corpuscular Hemoglobin Conc 31.7 (*)     RDW 18.8 (*)     MPV 7.4 (*)     All other components within normal limits   COMPREHENSIVE METABOLIC PANEL - Abnormal; Notable for the following components:    BUN, Bld 25 (*)     All other components within normal limits   URINALYSIS, REFLEX TO URINE CULTURE - Abnormal; Notable for the following components:    Specific Gravity, UA >=1.030 (*)     All other components within normal limits    Narrative:     Preferred Collection Type->Urine, Clean Catch   LIPASE   POCT GLUCOSE   POCT GLUCOSE MONITORING CONTINUOUS     EKG Readings: (Independently Interpreted)   Initial Reading: No STEMI.   Normal sinus rhythm at a rate of 70 bpm. Normal intervals. Normal axis. No STEMI.       Imaging Results          X-Ray Chest PA And Lateral (Final result)  Result time 05/07/19 13:14:41    Final result by Yan Sahu Jr., MD (05/07/19 13:14:41)                 Impression:      Moderate hiatal hernia.  Small right pericardial fat pad.  Otherwise negative chest.      Electronically signed by: Yan  MD Luis Alberto  Date:    05/07/2019  Time:    13:14             Narrative:    EXAMINATION:  XR CHEST PA AND LATERAL    CLINICAL HISTORY:  Shortness of breath    TECHNIQUE:  PA and lateral views of the chest were performed.    COMPARISON:  Chest of January 9, 2018.    FINDINGS:  The mediastinal and cardiac size and contour normal.  There is a hiatal hernia identified behind the heart with an air-fluid level.  A right pericardial fat pad is noted.  No intrapulmonary mass or infiltrate is seen.                                 Medical Decision Making:   History:   Old Medical Records: I decided to obtain old medical records.  Independently Interpreted Test(s):   I have ordered and independently interpreted EKG Reading(s) - see summary below       <> Summary of EKG Reading(s): Normal sinus rhythm at a rate of 70 bpm. Normal intervals. Normal axis. No STEMI.  Clinical Tests:   Lab Tests: Ordered and Reviewed  Radiological Study: Ordered and Reviewed  ED Management:  Patient without any prodromal symptoms of SOB and no h/o CHF.  No family history of sudden cardiac death.   Pt otherwise in normal state of health.  Do not believe this was a seizure given hx.  Currently at baseline mental status with no cardiac murmurs on exam.  Neuro exam benign and nonfocal.  Low suspicion at this time for ACS, dissection, or malignant arrhythmia.  Unlikely GI bleed.          ED Workup:   Will check labs for electrolyte disturbances.  Will obtain EKG for initial cardiac eval and reassess.    Findings:  EKG: No e/o STEMI. No evidence of Brugadas sign, delta wave, epsilon wave, significantly prolonged QTc, or malignant arrhythmia.  Labs reassuring.  Neg [place] syncope rules (no hx of CHF, Hct >30%, EKG sinus rhythm with no significant change, no SOB, SBP > 90).        Disposition: DC home. Return precautions expressed and understood in person. Advised follow up with primary care provider or clinic physician in next 24 hours.               Scribe Attestation:   Scribe #1: I performed the above scribed service and the documentation accurately describes the services I performed. I attest to the accuracy of the note.      Attending Attestation:     Physician Attestation for Scribe:    I, Dr. Benton Ruiz, personally performed the services described in this documentation.   All medical record entries made by the scribe were at my direction and in my presence.   I have reviewed the chart and agree that the record is accurate and complete.   Benton Ruiz MD  12:09 PM 05/09/2019     DISCLAIMER: This note was prepared with [employer] voice recognition transcription software. Garbled syntax, mangled pronouns, and other bizarre constructions may be attributed to that software system.          ED Course as of May 07 1539   Tue May 07, 2019   1307 72-year-old female past medical history of hypertension, and anemia and COPD presents today with generalized fatigue and weakness after fasting for glucose tolerance test today.  Afebrile.  Vital signs stable. Nonfocal neuro exam.  Lungs clear bilaterally.    [BD]   1536 Impression       Moderate hiatal hernia.  Small right pericardial fat pad.  Otherwise negative chest.      Electronically signed by: Yan Sahu MD  Date: 05/07/2019  Time: 13:14        [BD]      ED Course User Index  [BD] Benton Ruiz MD     Clinical Impression:       ICD-10-CM ICD-9-CM   1. Fatigue R53.83 780.79   2. SOB (shortness of breath) R06.02 786.05         Disposition:   Disposition: Discharged  Condition: Stable                        Benton Ruiz MD  05/09/19 1210     declines

## 2023-12-24 LAB
-  CTX-M RESISTANCE MARKER: SIGNIFICANT CHANGE UP
-  CTX-M RESISTANCE MARKER: SIGNIFICANT CHANGE UP
-  ESBL: SIGNIFICANT CHANGE UP
-  ESBL: SIGNIFICANT CHANGE UP
ANION GAP SERPL CALC-SCNC: 9 MMOL/L — SIGNIFICANT CHANGE UP (ref 5–17)
ANION GAP SERPL CALC-SCNC: 9 MMOL/L — SIGNIFICANT CHANGE UP (ref 5–17)
BASOPHILS # BLD AUTO: 0.03 K/UL — SIGNIFICANT CHANGE UP (ref 0–0.2)
BASOPHILS # BLD AUTO: 0.03 K/UL — SIGNIFICANT CHANGE UP (ref 0–0.2)
BASOPHILS NFR BLD AUTO: 0.3 % — SIGNIFICANT CHANGE UP (ref 0–2)
BASOPHILS NFR BLD AUTO: 0.3 % — SIGNIFICANT CHANGE UP (ref 0–2)
BUN SERPL-MCNC: 7.7 MG/DL — LOW (ref 8–20)
BUN SERPL-MCNC: 7.7 MG/DL — LOW (ref 8–20)
CALCIUM SERPL-MCNC: 8.2 MG/DL — LOW (ref 8.4–10.5)
CALCIUM SERPL-MCNC: 8.2 MG/DL — LOW (ref 8.4–10.5)
CHLORIDE SERPL-SCNC: 104 MMOL/L — SIGNIFICANT CHANGE UP (ref 96–108)
CHLORIDE SERPL-SCNC: 104 MMOL/L — SIGNIFICANT CHANGE UP (ref 96–108)
CO2 SERPL-SCNC: 29 MMOL/L — SIGNIFICANT CHANGE UP (ref 22–29)
CO2 SERPL-SCNC: 29 MMOL/L — SIGNIFICANT CHANGE UP (ref 22–29)
CREAT SERPL-MCNC: 0.55 MG/DL — SIGNIFICANT CHANGE UP (ref 0.5–1.3)
CREAT SERPL-MCNC: 0.55 MG/DL — SIGNIFICANT CHANGE UP (ref 0.5–1.3)
EGFR: 96 ML/MIN/1.73M2 — SIGNIFICANT CHANGE UP
EGFR: 96 ML/MIN/1.73M2 — SIGNIFICANT CHANGE UP
EOSINOPHIL # BLD AUTO: 0.13 K/UL — SIGNIFICANT CHANGE UP (ref 0–0.5)
EOSINOPHIL # BLD AUTO: 0.13 K/UL — SIGNIFICANT CHANGE UP (ref 0–0.5)
EOSINOPHIL NFR BLD AUTO: 1.3 % — SIGNIFICANT CHANGE UP (ref 0–6)
EOSINOPHIL NFR BLD AUTO: 1.3 % — SIGNIFICANT CHANGE UP (ref 0–6)
GLUCOSE BLDC GLUCOMTR-MCNC: 182 MG/DL — HIGH (ref 70–99)
GLUCOSE BLDC GLUCOMTR-MCNC: 182 MG/DL — HIGH (ref 70–99)
GLUCOSE BLDC GLUCOMTR-MCNC: 204 MG/DL — HIGH (ref 70–99)
GLUCOSE BLDC GLUCOMTR-MCNC: 204 MG/DL — HIGH (ref 70–99)
GLUCOSE BLDC GLUCOMTR-MCNC: 208 MG/DL — HIGH (ref 70–99)
GLUCOSE BLDC GLUCOMTR-MCNC: 208 MG/DL — HIGH (ref 70–99)
GLUCOSE BLDC GLUCOMTR-MCNC: 212 MG/DL — HIGH (ref 70–99)
GLUCOSE BLDC GLUCOMTR-MCNC: 212 MG/DL — HIGH (ref 70–99)
GLUCOSE SERPL-MCNC: 145 MG/DL — HIGH (ref 70–99)
GLUCOSE SERPL-MCNC: 145 MG/DL — HIGH (ref 70–99)
GRAM STN FLD: ABNORMAL
GRAM STN FLD: ABNORMAL
HCT VFR BLD CALC: 32 % — LOW (ref 34.5–45)
HCT VFR BLD CALC: 32 % — LOW (ref 34.5–45)
HGB BLD-MCNC: 8.8 G/DL — LOW (ref 11.5–15.5)
HGB BLD-MCNC: 8.8 G/DL — LOW (ref 11.5–15.5)
IMM GRANULOCYTES NFR BLD AUTO: 0.5 % — SIGNIFICANT CHANGE UP (ref 0–0.9)
IMM GRANULOCYTES NFR BLD AUTO: 0.5 % — SIGNIFICANT CHANGE UP (ref 0–0.9)
LYMPHOCYTES # BLD AUTO: 0.98 K/UL — LOW (ref 1–3.3)
LYMPHOCYTES # BLD AUTO: 0.98 K/UL — LOW (ref 1–3.3)
LYMPHOCYTES # BLD AUTO: 9.7 % — LOW (ref 13–44)
LYMPHOCYTES # BLD AUTO: 9.7 % — LOW (ref 13–44)
MAGNESIUM SERPL-MCNC: 1.8 MG/DL — SIGNIFICANT CHANGE UP (ref 1.6–2.6)
MAGNESIUM SERPL-MCNC: 1.8 MG/DL — SIGNIFICANT CHANGE UP (ref 1.6–2.6)
MCHC RBC-ENTMCNC: 18.2 PG — LOW (ref 27–34)
MCHC RBC-ENTMCNC: 18.2 PG — LOW (ref 27–34)
MCHC RBC-ENTMCNC: 27.5 GM/DL — LOW (ref 32–36)
MCHC RBC-ENTMCNC: 27.5 GM/DL — LOW (ref 32–36)
MCV RBC AUTO: 66.3 FL — LOW (ref 80–100)
MCV RBC AUTO: 66.3 FL — LOW (ref 80–100)
METHOD TYPE: SIGNIFICANT CHANGE UP
METHOD TYPE: SIGNIFICANT CHANGE UP
MONOCYTES # BLD AUTO: 0.94 K/UL — HIGH (ref 0–0.9)
MONOCYTES # BLD AUTO: 0.94 K/UL — HIGH (ref 0–0.9)
MONOCYTES NFR BLD AUTO: 9.3 % — SIGNIFICANT CHANGE UP (ref 2–14)
MONOCYTES NFR BLD AUTO: 9.3 % — SIGNIFICANT CHANGE UP (ref 2–14)
NEUTROPHILS # BLD AUTO: 7.97 K/UL — HIGH (ref 1.8–7.4)
NEUTROPHILS # BLD AUTO: 7.97 K/UL — HIGH (ref 1.8–7.4)
NEUTROPHILS NFR BLD AUTO: 78.9 % — HIGH (ref 43–77)
NEUTROPHILS NFR BLD AUTO: 78.9 % — HIGH (ref 43–77)
PHOSPHATE SERPL-MCNC: 2.3 MG/DL — LOW (ref 2.4–4.7)
PHOSPHATE SERPL-MCNC: 2.3 MG/DL — LOW (ref 2.4–4.7)
PLATELET # BLD AUTO: 321 K/UL — SIGNIFICANT CHANGE UP (ref 150–400)
PLATELET # BLD AUTO: 321 K/UL — SIGNIFICANT CHANGE UP (ref 150–400)
POTASSIUM SERPL-MCNC: 4 MMOL/L — SIGNIFICANT CHANGE UP (ref 3.5–5.3)
POTASSIUM SERPL-MCNC: 4 MMOL/L — SIGNIFICANT CHANGE UP (ref 3.5–5.3)
POTASSIUM SERPL-SCNC: 4 MMOL/L — SIGNIFICANT CHANGE UP (ref 3.5–5.3)
POTASSIUM SERPL-SCNC: 4 MMOL/L — SIGNIFICANT CHANGE UP (ref 3.5–5.3)
PROTEUS SP DNA BLD POS QL NAA+NON-PROBE: SIGNIFICANT CHANGE UP
PROTEUS SP DNA BLD POS QL NAA+NON-PROBE: SIGNIFICANT CHANGE UP
RBC # BLD: 4.83 M/UL — SIGNIFICANT CHANGE UP (ref 3.8–5.2)
RBC # BLD: 4.83 M/UL — SIGNIFICANT CHANGE UP (ref 3.8–5.2)
RBC # FLD: 21.9 % — HIGH (ref 10.3–14.5)
RBC # FLD: 21.9 % — HIGH (ref 10.3–14.5)
SODIUM SERPL-SCNC: 142 MMOL/L — SIGNIFICANT CHANGE UP (ref 135–145)
SODIUM SERPL-SCNC: 142 MMOL/L — SIGNIFICANT CHANGE UP (ref 135–145)
WBC # BLD: 10.1 K/UL — SIGNIFICANT CHANGE UP (ref 3.8–10.5)
WBC # BLD: 10.1 K/UL — SIGNIFICANT CHANGE UP (ref 3.8–10.5)
WBC # FLD AUTO: 10.1 K/UL — SIGNIFICANT CHANGE UP (ref 3.8–10.5)
WBC # FLD AUTO: 10.1 K/UL — SIGNIFICANT CHANGE UP (ref 3.8–10.5)

## 2023-12-24 PROCEDURE — 99233 SBSQ HOSP IP/OBS HIGH 50: CPT

## 2023-12-24 PROCEDURE — 73630 X-RAY EXAM OF FOOT: CPT | Mod: 26,LT

## 2023-12-24 PROCEDURE — 99232 SBSQ HOSP IP/OBS MODERATE 35: CPT

## 2023-12-24 RX ORDER — POTASSIUM PHOSPHATE, MONOBASIC POTASSIUM PHOSPHATE, DIBASIC 236; 224 MG/ML; MG/ML
15 INJECTION, SOLUTION INTRAVENOUS ONCE
Refills: 0 | Status: COMPLETED | OUTPATIENT
Start: 2023-12-24 | End: 2023-12-24

## 2023-12-24 RX ADMIN — GABAPENTIN 100 MILLIGRAM(S): 400 CAPSULE ORAL at 21:44

## 2023-12-24 RX ADMIN — Medication 5 UNIT(S): at 17:09

## 2023-12-24 RX ADMIN — Medication 5 UNIT(S): at 11:39

## 2023-12-24 RX ADMIN — GABAPENTIN 100 MILLIGRAM(S): 400 CAPSULE ORAL at 06:13

## 2023-12-24 RX ADMIN — APIXABAN 5 MILLIGRAM(S): 2.5 TABLET, FILM COATED ORAL at 17:08

## 2023-12-24 RX ADMIN — Medication 1200 MILLIGRAM(S): at 17:08

## 2023-12-24 RX ADMIN — TIOTROPIUM BROMIDE 2 PUFF(S): 18 CAPSULE ORAL; RESPIRATORY (INHALATION) at 08:27

## 2023-12-24 RX ADMIN — MEXILETINE HYDROCHLORIDE 200 MILLIGRAM(S): 150 CAPSULE ORAL at 13:28

## 2023-12-24 RX ADMIN — LEVETIRACETAM 500 MILLIGRAM(S): 250 TABLET, FILM COATED ORAL at 06:13

## 2023-12-24 RX ADMIN — FAMOTIDINE 20 MILLIGRAM(S): 10 INJECTION INTRAVENOUS at 13:28

## 2023-12-24 RX ADMIN — MEXILETINE HYDROCHLORIDE 200 MILLIGRAM(S): 150 CAPSULE ORAL at 21:44

## 2023-12-24 RX ADMIN — Medication 2: at 17:09

## 2023-12-24 RX ADMIN — INSULIN GLARGINE 16 UNIT(S): 100 INJECTION, SOLUTION SUBCUTANEOUS at 21:45

## 2023-12-24 RX ADMIN — Medication 1200 MILLIGRAM(S): at 06:14

## 2023-12-24 RX ADMIN — Medication 50 MILLIGRAM(S): at 06:16

## 2023-12-24 RX ADMIN — ATORVASTATIN CALCIUM 20 MILLIGRAM(S): 80 TABLET, FILM COATED ORAL at 21:44

## 2023-12-24 RX ADMIN — Medication 2: at 11:40

## 2023-12-24 RX ADMIN — APIXABAN 5 MILLIGRAM(S): 2.5 TABLET, FILM COATED ORAL at 06:13

## 2023-12-24 RX ADMIN — Medication 5 UNIT(S): at 08:37

## 2023-12-24 RX ADMIN — LACTULOSE 10 GRAM(S): 10 SOLUTION ORAL at 06:13

## 2023-12-24 RX ADMIN — Medication 8 MILLIGRAM(S): at 06:14

## 2023-12-24 RX ADMIN — MONTELUKAST 10 MILLIGRAM(S): 4 TABLET, CHEWABLE ORAL at 13:28

## 2023-12-24 RX ADMIN — Medication 3 MILLILITER(S): at 20:35

## 2023-12-24 RX ADMIN — MEXILETINE HYDROCHLORIDE 200 MILLIGRAM(S): 150 CAPSULE ORAL at 06:14

## 2023-12-24 RX ADMIN — LEVETIRACETAM 500 MILLIGRAM(S): 250 TABLET, FILM COATED ORAL at 17:08

## 2023-12-24 RX ADMIN — POTASSIUM PHOSPHATE, MONOBASIC POTASSIUM PHOSPHATE, DIBASIC 62.5 MILLIMOLE(S): 236; 224 INJECTION, SOLUTION INTRAVENOUS at 11:39

## 2023-12-24 RX ADMIN — Medication 3 MILLILITER(S): at 14:58

## 2023-12-24 RX ADMIN — GABAPENTIN 100 MILLIGRAM(S): 400 CAPSULE ORAL at 13:28

## 2023-12-24 RX ADMIN — Medication 3 MILLILITER(S): at 08:27

## 2023-12-24 RX ADMIN — Medication 1: at 08:37

## 2023-12-24 RX ADMIN — ERTAPENEM SODIUM 120 MILLIGRAM(S): 1 INJECTION, POWDER, LYOPHILIZED, FOR SOLUTION INTRAMUSCULAR; INTRAVENOUS at 06:17

## 2023-12-24 RX ADMIN — CLOPIDOGREL BISULFATE 75 MILLIGRAM(S): 75 TABLET, FILM COATED ORAL at 13:28

## 2023-12-24 NOTE — CONSULT NOTE ADULT - SUBJECTIVE AND OBJECTIVE BOX
Podiatry HPI: Patient is a 73F with PMHx of AFib, DM (uncontrolled), COPD, TAVR, tracheomalacia, aortic dissection, ostomy 2/2 colorectal cancer, adrenal insufficiency (on steroids) who is admitted for SOB/COPD exacerbation; podiatry consulted for L. heel wound. Patient states her ambulatory status is normally walking however; due to her health problems the last 2 years, she has been nonambulatory for majority of that time. Does not currently have a podiatrist and cannot recall if ever had a wound to L. foot. States her L. heel is very tender to touch. Denies current constitutional symptoms or further pedal complaints.     HPI:  73-year-old female patient with a history of A-fib, COPD, TAVR,  tracheomalacia, aortic dissection, ostomy secondary to colorectal cancer, adrenal insufficiency, on steroids, presents to the ED complaining of nausea, vomiting and SOB.  Pt is a poor historian and not able to provide a full history. Only reports not feeling well and worsening SOB with productive cough   Per daughter over the phone, was not feeling well, was confused, with decrease appetite, not drinking, had an episode of vomiting.   In Ed, was febrile with elevated WBC   During my encounter she denied having fever, headache, dizziness, chest pain, palpitation, abdominal pain, change in urinary and bowel habits     (21 Dec 2023 17:08)      Podiatry HPI    PMH:Atrial fibrillation    Diabetes    Hypertension    COPD (chronic obstructive pulmonary disease)    Adrenal insufficiency    Aortic insufficiency    Pelvic fracture    Asthma    Hypertension    Tracheobronchomalacia    Colorectal cancer    Aortic disease    Rectal bleeding    Seizure    DVT (deep venous thrombosis)    TIA (transient ischemic attack)      Allergies: aspirin (Short breath)  Dilaudid (Short breath)  tetanus toxoid (Short breath)  Avelox (Short breath; Pruritus)  Valium (Short breath)  codeine (Short breath)  shellfish (Anaphylaxis)  cefepime (Anaphylaxis)  penicillin (Anaphylaxis)  iodine (Short breath; Swelling)    Medications: albuterol/ipratropium for Nebulization 3 milliLiter(s) Nebulizer every 6 hours  apixaban 5 milliGRAM(s) Oral every 12 hours  atorvastatin 20 milliGRAM(s) Oral at bedtime  clopidogrel Tablet 75 milliGRAM(s) Oral daily  dextrose 5%. 1000 milliLiter(s) IV Continuous <Continuous>  dextrose 5%. 1000 milliLiter(s) IV Continuous <Continuous>  dextrose 50% Injectable 25 Gram(s) IV Push once  dextrose 50% Injectable 12.5 Gram(s) IV Push once  dextrose 50% Injectable 25 Gram(s) IV Push once  dextrose Oral Gel 15 Gram(s) Oral once PRN  diphenhydrAMINE Injectable 50 milliGRAM(s) IV Push daily  ertapenem  IVPB 1000 milliGRAM(s) IV Intermittent every 24 hours  famotidine    Tablet 20 milliGRAM(s) Oral daily  gabapentin 100 milliGRAM(s) Oral every 8 hours  glucagon  Injectable 1 milliGRAM(s) IntraMuscular once  guaiFENesin ER 1200 milliGRAM(s) Oral every 12 hours  influenza  Vaccine (HIGH DOSE) 0.7 milliLiter(s) IntraMuscular once  insulin glargine Injectable (LANTUS) 16 Unit(s) SubCutaneous at bedtime  insulin lispro (ADMELOG) corrective regimen sliding scale   SubCutaneous three times a day before meals  insulin lispro Injectable (ADMELOG) 5 Unit(s) SubCutaneous three times a day before meals  lactulose Syrup 10 Gram(s) Oral daily PRN  levETIRAcetam 500 milliGRAM(s) Oral two times a day  methylPREDNISolone 8 milliGRAM(s) Oral daily  mexiletine 200 milliGRAM(s) Oral every 8 hours  montelukast 10 milliGRAM(s) Oral daily  ondansetron    Tablet 4 milliGRAM(s) Oral every 6 hours PRN  tiotropium 2.5 MICROgram(s) Inhaler 2 Puff(s) Inhalation daily    FH:Family history of asthma    Family history of breast cancer (Sibling)    Family history of diabetes mellitus type II      PSX: History of partial hysterectomy    H/O total knee replacement, bilateral    History of sinus surgery    Exostosis of orbit, left    H/O pelvic surgery    History of surgery    History of tracheomalacia    S/P bronchoscopy    Rectal bleeding    S/P TAVR (transcatheter aortic valve replacement)    S/P aortic valve replacement      SH: albuterol/ipratropium for Nebulization 3 milliLiter(s) Nebulizer every 6 hours  apixaban 5 milliGRAM(s) Oral every 12 hours  atorvastatin 20 milliGRAM(s) Oral at bedtime  clopidogrel Tablet 75 milliGRAM(s) Oral daily  dextrose 5%. 1000 milliLiter(s) IV Continuous <Continuous>  dextrose 5%. 1000 milliLiter(s) IV Continuous <Continuous>  dextrose 50% Injectable 25 Gram(s) IV Push once  dextrose 50% Injectable 12.5 Gram(s) IV Push once  dextrose 50% Injectable 25 Gram(s) IV Push once  dextrose Oral Gel 15 Gram(s) Oral once PRN  diphenhydrAMINE Injectable 50 milliGRAM(s) IV Push daily  ertapenem  IVPB 1000 milliGRAM(s) IV Intermittent every 24 hours  famotidine    Tablet 20 milliGRAM(s) Oral daily  gabapentin 100 milliGRAM(s) Oral every 8 hours  glucagon  Injectable 1 milliGRAM(s) IntraMuscular once  guaiFENesin ER 1200 milliGRAM(s) Oral every 12 hours  influenza  Vaccine (HIGH DOSE) 0.7 milliLiter(s) IntraMuscular once  insulin glargine Injectable (LANTUS) 16 Unit(s) SubCutaneous at bedtime  insulin lispro (ADMELOG) corrective regimen sliding scale   SubCutaneous three times a day before meals  insulin lispro Injectable (ADMELOG) 5 Unit(s) SubCutaneous three times a day before meals  lactulose Syrup 10 Gram(s) Oral daily PRN  levETIRAcetam 500 milliGRAM(s) Oral two times a day  methylPREDNISolone 8 milliGRAM(s) Oral daily  mexiletine 200 milliGRAM(s) Oral every 8 hours  montelukast 10 milliGRAM(s) Oral daily  ondansetron    Tablet 4 milliGRAM(s) Oral every 6 hours PRN  tiotropium 2.5 MICROgram(s) Inhaler 2 Puff(s) Inhalation daily      Vital Signs Last 24 Hrs  T(C): 37.2 (24 Dec 2023 08:49), Max: 37.3 (24 Dec 2023 05:30)  T(F): 99 (24 Dec 2023 08:49), Max: 99.1 (24 Dec 2023 05:30)  HR: 76 (24 Dec 2023 14:59) (70 - 76)  BP: 138/74 (24 Dec 2023 08:49) (123/73 - 155/71)  BP(mean): --  RR: 18 (24 Dec 2023 08:49) (18 - 18)  SpO2: 98% (24 Dec 2023 14:59) (97% - 100%)    Parameters below as of 24 Dec 2023 14:59  Patient On (Oxygen Delivery Method): nasal cannula, 2L        LABS                        8.8    10.10 )-----------( 321      ( 24 Dec 2023 07:57 )             32.0               12-24    142  |  104  |  7.7<L>  ----------------------------<  145<H>  4.0   |  29.0  |  0.55    Ca    8.2<L>      24 Dec 2023 07:57  Phos  2.3     12-24  Mg     1.8     12-24    TPro  5.5<L>  /  Alb  2.7<L>  /  TBili  <0.2<L>  /  DBili  x   /  AST  16  /  ALT  9   /  AlkPhos  78  12-23      ROS  REVIEW OF SYSTEMS:    CONSTITUTIONAL: No fever, weight loss, or fatigue  EYES: No eye pain, visual disturbances, or discharge  ENMT:  No difficulty hearing, tinnitus, vertigo; No sinus or throat pain  NECK: No pain or stiffness  BREASTS: No pain, masses, or nipple discharge  RESPIRATORY: No cough, wheezing, chills or hemoptysis; No shortness of breath  CARDIOVASCULAR: No chest pain, palpitations, dizziness, or leg swelling  GASTROINTESTINAL: No abdominal or epigastric pain. No nausea, vomiting, or hematemesis; No diarrhea or constipation. No melena or hematochezia.  GENITOURINARY: No dysuria, frequency, hematuria, or incontinence  NEUROLOGICAL: No headaches, memory loss, loss of strength, numbness, or tremors  SKIN: No itching, burning, rashes, or lesions   LYMPH NODES: No enlarged glands  ENDOCRINE: No heat or cold intolerance; No hair loss  MUSCULOSKELETAL: Pain to L. posterior heel   PSYCHIATRIC: No depression, anxiety, mood swings, or difficulty sleeping  HEME/LYMPH: No easy bruising, or bleeding gums  ALLERY AND IMMUNOLOGIC: No hives or eczema      PHYSICAL EXAM  LE Focused:    Vasc: DP/PT pulses palpable 2/4 b/l; hair growth absent; no edema noted to b/l foot   Derm: No open wounds b/l; L. posterior heel bony prominence noted with overlying mild erythema without fluctuance, crepitus, streaking and some hyperkeratotic tissue.   Neuro: Protective sensation present b/l   MSK: POP to L. posterior heel; patient is mainly non ambulatory       IMAGING:   L. foot xray ordered; pending     CULTURES: Culture Results:   Growth in anaerobic bottle: Gram Negative Rods  Direct identification is available within approximately 3-5  hours either by Blood Panel Multiplexed PCR or Direct  MALDI-TOF. Details: [email or web address] (12-21 @ 12:00)  Culture Results:   No growth at 48 Hours (12-21 @ 11:50)      A:      P:   Patient evaluated and Chart reviewed   Discussed diagnosis and treatment with patient  Offloading to bilateral Heels; patient refused CAIR boots however; agreeable to pillows for offloading; d/w patient risks of not offloading until full patient understanding   L. foot xrays ordered; pending  Discussed importance of daily foot examinations and proper shoe gear and to importance of lower Fasting Blood Glucose levels  Podiatry to follow while in house.  Discussed with Attending Dr. Randhawa   Podiatry HPI: Patient is a 73F with PMHx of AFib, DM (uncontrolled), COPD, TAVR, tracheomalacia, aortic dissection, ostomy 2/2 colorectal cancer, adrenal insufficiency (on steroids) who is admitted for SOB/COPD exacerbation; podiatry consulted for L. heel wound. Patient states her ambulatory status is normally walking however; due to her health problems the last 2 years, she has been nonambulatory for majority of that time. Does not currently have a podiatrist and cannot recall if ever had a wound to L. foot. States her L. heel is very tender to touch. Denies current constitutional symptoms or further pedal complaints.     HPI:  73-year-old female patient with a history of A-fib, COPD, TAVR,  tracheomalacia, aortic dissection, ostomy secondary to colorectal cancer, adrenal insufficiency, on steroids, presents to the ED complaining of nausea, vomiting and SOB.  Pt is a poor historian and not able to provide a full history. Only reports not feeling well and worsening SOB with productive cough   Per daughter over the phone, was not feeling well, was confused, with decrease appetite, not drinking, had an episode of vomiting.   In Ed, was febrile with elevated WBC   During my encounter she denied having fever, headache, dizziness, chest pain, palpitation, abdominal pain, change in urinary and bowel habits     (21 Dec 2023 17:08)      Podiatry HPI    PMH:Atrial fibrillation    Diabetes    Hypertension    COPD (chronic obstructive pulmonary disease)    Adrenal insufficiency    Aortic insufficiency    Pelvic fracture    Asthma    Hypertension    Tracheobronchomalacia    Colorectal cancer    Aortic disease    Rectal bleeding    Seizure    DVT (deep venous thrombosis)    TIA (transient ischemic attack)      Allergies: aspirin (Short breath)  Dilaudid (Short breath)  tetanus toxoid (Short breath)  Avelox (Short breath; Pruritus)  Valium (Short breath)  codeine (Short breath)  shellfish (Anaphylaxis)  cefepime (Anaphylaxis)  penicillin (Anaphylaxis)  iodine (Short breath; Swelling)    Medications: albuterol/ipratropium for Nebulization 3 milliLiter(s) Nebulizer every 6 hours  apixaban 5 milliGRAM(s) Oral every 12 hours  atorvastatin 20 milliGRAM(s) Oral at bedtime  clopidogrel Tablet 75 milliGRAM(s) Oral daily  dextrose 5%. 1000 milliLiter(s) IV Continuous <Continuous>  dextrose 5%. 1000 milliLiter(s) IV Continuous <Continuous>  dextrose 50% Injectable 25 Gram(s) IV Push once  dextrose 50% Injectable 12.5 Gram(s) IV Push once  dextrose 50% Injectable 25 Gram(s) IV Push once  dextrose Oral Gel 15 Gram(s) Oral once PRN  diphenhydrAMINE Injectable 50 milliGRAM(s) IV Push daily  ertapenem  IVPB 1000 milliGRAM(s) IV Intermittent every 24 hours  famotidine    Tablet 20 milliGRAM(s) Oral daily  gabapentin 100 milliGRAM(s) Oral every 8 hours  glucagon  Injectable 1 milliGRAM(s) IntraMuscular once  guaiFENesin ER 1200 milliGRAM(s) Oral every 12 hours  influenza  Vaccine (HIGH DOSE) 0.7 milliLiter(s) IntraMuscular once  insulin glargine Injectable (LANTUS) 16 Unit(s) SubCutaneous at bedtime  insulin lispro (ADMELOG) corrective regimen sliding scale   SubCutaneous three times a day before meals  insulin lispro Injectable (ADMELOG) 5 Unit(s) SubCutaneous three times a day before meals  lactulose Syrup 10 Gram(s) Oral daily PRN  levETIRAcetam 500 milliGRAM(s) Oral two times a day  methylPREDNISolone 8 milliGRAM(s) Oral daily  mexiletine 200 milliGRAM(s) Oral every 8 hours  montelukast 10 milliGRAM(s) Oral daily  ondansetron    Tablet 4 milliGRAM(s) Oral every 6 hours PRN  tiotropium 2.5 MICROgram(s) Inhaler 2 Puff(s) Inhalation daily    FH:Family history of asthma    Family history of breast cancer (Sibling)    Family history of diabetes mellitus type II      PSX: History of partial hysterectomy    H/O total knee replacement, bilateral    History of sinus surgery    Exostosis of orbit, left    H/O pelvic surgery    History of surgery    History of tracheomalacia    S/P bronchoscopy    Rectal bleeding    S/P TAVR (transcatheter aortic valve replacement)    S/P aortic valve replacement      SH: albuterol/ipratropium for Nebulization 3 milliLiter(s) Nebulizer every 6 hours  apixaban 5 milliGRAM(s) Oral every 12 hours  atorvastatin 20 milliGRAM(s) Oral at bedtime  clopidogrel Tablet 75 milliGRAM(s) Oral daily  dextrose 5%. 1000 milliLiter(s) IV Continuous <Continuous>  dextrose 5%. 1000 milliLiter(s) IV Continuous <Continuous>  dextrose 50% Injectable 25 Gram(s) IV Push once  dextrose 50% Injectable 12.5 Gram(s) IV Push once  dextrose 50% Injectable 25 Gram(s) IV Push once  dextrose Oral Gel 15 Gram(s) Oral once PRN  diphenhydrAMINE Injectable 50 milliGRAM(s) IV Push daily  ertapenem  IVPB 1000 milliGRAM(s) IV Intermittent every 24 hours  famotidine    Tablet 20 milliGRAM(s) Oral daily  gabapentin 100 milliGRAM(s) Oral every 8 hours  glucagon  Injectable 1 milliGRAM(s) IntraMuscular once  guaiFENesin ER 1200 milliGRAM(s) Oral every 12 hours  influenza  Vaccine (HIGH DOSE) 0.7 milliLiter(s) IntraMuscular once  insulin glargine Injectable (LANTUS) 16 Unit(s) SubCutaneous at bedtime  insulin lispro (ADMELOG) corrective regimen sliding scale   SubCutaneous three times a day before meals  insulin lispro Injectable (ADMELOG) 5 Unit(s) SubCutaneous three times a day before meals  lactulose Syrup 10 Gram(s) Oral daily PRN  levETIRAcetam 500 milliGRAM(s) Oral two times a day  methylPREDNISolone 8 milliGRAM(s) Oral daily  mexiletine 200 milliGRAM(s) Oral every 8 hours  montelukast 10 milliGRAM(s) Oral daily  ondansetron    Tablet 4 milliGRAM(s) Oral every 6 hours PRN  tiotropium 2.5 MICROgram(s) Inhaler 2 Puff(s) Inhalation daily      Vital Signs Last 24 Hrs  T(C): 37.2 (24 Dec 2023 08:49), Max: 37.3 (24 Dec 2023 05:30)  T(F): 99 (24 Dec 2023 08:49), Max: 99.1 (24 Dec 2023 05:30)  HR: 76 (24 Dec 2023 14:59) (70 - 76)  BP: 138/74 (24 Dec 2023 08:49) (123/73 - 155/71)  BP(mean): --  RR: 18 (24 Dec 2023 08:49) (18 - 18)  SpO2: 98% (24 Dec 2023 14:59) (97% - 100%)    Parameters below as of 24 Dec 2023 14:59  Patient On (Oxygen Delivery Method): nasal cannula, 2L        LABS                        8.8    10.10 )-----------( 321      ( 24 Dec 2023 07:57 )             32.0               12-24    142  |  104  |  7.7<L>  ----------------------------<  145<H>  4.0   |  29.0  |  0.55    Ca    8.2<L>      24 Dec 2023 07:57  Phos  2.3     12-24  Mg     1.8     12-24    TPro  5.5<L>  /  Alb  2.7<L>  /  TBili  <0.2<L>  /  DBili  x   /  AST  16  /  ALT  9   /  AlkPhos  78  12-23      ROS  REVIEW OF SYSTEMS:    CONSTITUTIONAL: No fever, weight loss, or fatigue  EYES: No eye pain, visual disturbances, or discharge  ENMT:  No difficulty hearing, tinnitus, vertigo; No sinus or throat pain  NECK: No pain or stiffness  BREASTS: No pain, masses, or nipple discharge  RESPIRATORY: No cough, wheezing, chills or hemoptysis; No shortness of breath  CARDIOVASCULAR: No chest pain, palpitations, dizziness, or leg swelling  GASTROINTESTINAL: No abdominal or epigastric pain. No nausea, vomiting, or hematemesis; No diarrhea or constipation. No melena or hematochezia.  GENITOURINARY: No dysuria, frequency, hematuria, or incontinence  NEUROLOGICAL: No headaches, memory loss, loss of strength, numbness, or tremors  SKIN: No itching, burning, rashes, or lesions   LYMPH NODES: No enlarged glands  ENDOCRINE: No heat or cold intolerance; No hair loss  MUSCULOSKELETAL: Pain to L. posterior heel   PSYCHIATRIC: No depression, anxiety, mood swings, or difficulty sleeping  HEME/LYMPH: No easy bruising, or bleeding gums  ALLERY AND IMMUNOLOGIC: No hives or eczema      PHYSICAL EXAM  LE Focused:    Vasc: DP/PT pulses palpable 2/4 b/l; hair growth absent; no edema noted to b/l foot   Derm: No open wounds b/l; L. posterior heel bony prominence noted with overlying mild erythema without fluctuance, crepitus, streaking and some hyperkeratotic tissue.   Neuro: Protective sensation present b/l   MSK: POP to L. posterior heel; patient is mainly non ambulatory       IMAGING:   L. foot xray ordered; pending     CULTURES: Culture Results:   Growth in anaerobic bottle: Gram Negative Rods  Direct identification is available within approximately 3-5  hours either by Blood Panel Multiplexed PCR or Direct  MALDI-TOF. Details: [email or web address] (12-21 @ 12:00)  Culture Results:   No growth at 48 Hours (12-21 @ 11:50)      A:      P:   Patient evaluated and Chart reviewed   Discussed diagnosis and treatment with patient  Offloading to bilateral Heels; patient refused CAIR boots however; agreeable to pillows for offloading; d/w patient risks of not offloading until full patient understanding   L. foot xrays ordered; pending  Discussed importance of daily foot examinations and proper shoe gear and to importance of lower Fasting Blood Glucose levels  Podiatry to follow while in house.  Discussed with Attending Dr. Randhawa

## 2023-12-24 NOTE — PROGRESS NOTE ADULT - ASSESSMENT
73y  Female with h/o A-fib, COPD, TAVR, tracheomalacia, aortic dissection, ostomy secondary to colorectal cancer, adrenal insufficiency, on steroids. Patient presents to the ED complaining of nausea, vomiting and SOB. Patient reports not feeling well, unable to elaborate on her symptoms. In the ER patient is febrile to 101.9F, leukocytosis. Started on Ertapenem.    ESBL Proteus bacteremia/sepsis  Aspiration PNA  Fever  Leukocytosis   Multiple antibiotic allergies   Left foot pain       - Blood cultures 12/21 reporting ESBL Proteus   - Repeat blood cultures ordered  - RVP/COVID 19 PCR 12/21 negative   - CT Chest reporting multifocal PNA   - CT A/P without contrast reporting large stool in colon, no other acute findings   - Check CT A/P with contrast   - Procalcitonin level 0.52  - Continue ertapenem with benadryl   - Patient takes benadryl daily when on antibiotics to prevent allergy, will continue this way for now. Needs to see allergist as outpatient.   - swallow eval noted   - Follow up cultures  - Trend Fever  - Trend WBC      Will follow      Discussed treatment plan with: Dr Dolan

## 2023-12-24 NOTE — PROGRESS NOTE ADULT - SUBJECTIVE AND OBJECTIVE BOX
INCOMPLETE    73-year-old female patient with a history of A-fib, COPD, TAVR,  tracheomalacia, aortic dissection, ostomy secondary to colorectal cancer, adrenal insufficiency, on steroids, presents to the ED complaining of nausea, vomiting and SOB.    INTERVAL HPI/OVERNIGHT EVENTS:    SUBJECTIVE: Patient seen and examined at bedside.     ROS: All negative except as listed above.    VITAL SIGNS:  ICU Vital Signs Last 24 Hrs  T(C): 37.3 (24 Dec 2023 05:30), Max: 37.3 (24 Dec 2023 05:30)  T(F): 99.1 (24 Dec 2023 05:30), Max: 99.1 (24 Dec 2023 05:30)  HR: 74 (24 Dec 2023 05:30) (65 - 74)  BP: 146/80 (24 Dec 2023 05:30) (123/73 - 155/71)  BP(mean): --  ABP: --  ABP(mean): --  RR: 18 (24 Dec 2023 05:30) (18 - 18)  SpO2: 100% (24 Dec 2023 05:30) (94% - 100%)    O2 Parameters below as of 24 Dec 2023 05:30  Patient On (Oxygen Delivery Method): nasal cannula  O2 Flow (L/min): 3    Plateau pressure:   P/F ratio:     12-23 @ 07:01  -  12-24 @ 07:00  --------------------------------------------------------  IN: 0 mL / OUT: 1100 mL / NET: -1100 mL      CAPILLARY BLOOD GLUCOSE  POCT Blood Glucose.: 138 mg/dL (23 Dec 2023 21:12)      ECG: reviewed.    PHYSICAL EXAM:    GENERAL: NAD  HEAD: Swollen Eyelids, no JVD  EYES: EOMI, PERRLA, conjunctiva and sclera clear  NECK: Supple, trachea midline, no JVD  HEART: Irregular rate and rhythm, no murmurs,   LUNGS: CTAB  ABDOMEN: Soft, nontender, nondistended, colostomy in place  EXTREMITIES: No edema  NERVOUS SYSTEM:  A&Ox2, no focal deficits     MEDICATIONS:  MEDICATIONS  (STANDING):  albuterol/ipratropium for Nebulization 3 milliLiter(s) Nebulizer every 6 hours  apixaban 5 milliGRAM(s) Oral every 12 hours  atorvastatin 20 milliGRAM(s) Oral at bedtime  clopidogrel Tablet 75 milliGRAM(s) Oral daily  dextrose 5%. 1000 milliLiter(s) (100 mL/Hr) IV Continuous <Continuous>  dextrose 5%. 1000 milliLiter(s) (50 mL/Hr) IV Continuous <Continuous>  dextrose 50% Injectable 25 Gram(s) IV Push once  dextrose 50% Injectable 25 Gram(s) IV Push once  dextrose 50% Injectable 12.5 Gram(s) IV Push once  diphenhydrAMINE Injectable 50 milliGRAM(s) IV Push daily  ertapenem  IVPB 1000 milliGRAM(s) IV Intermittent every 24 hours  famotidine    Tablet 20 milliGRAM(s) Oral daily  gabapentin 100 milliGRAM(s) Oral every 8 hours  glucagon  Injectable 1 milliGRAM(s) IntraMuscular once  guaiFENesin ER 1200 milliGRAM(s) Oral every 12 hours  influenza  Vaccine (HIGH DOSE) 0.7 milliLiter(s) IntraMuscular once  insulin glargine Injectable (LANTUS) 16 Unit(s) SubCutaneous at bedtime  insulin lispro (ADMELOG) corrective regimen sliding scale   SubCutaneous three times a day before meals  insulin lispro Injectable (ADMELOG) 5 Unit(s) SubCutaneous three times a day before meals  levETIRAcetam 500 milliGRAM(s) Oral two times a day  methylPREDNISolone 8 milliGRAM(s) Oral daily  mexiletine 200 milliGRAM(s) Oral every 8 hours  montelukast 10 milliGRAM(s) Oral daily  tiotropium 2.5 MICROgram(s) Inhaler 2 Puff(s) Inhalation daily    MEDICATIONS  (PRN):  dextrose Oral Gel 15 Gram(s) Oral once PRN Blood Glucose LESS THAN 70 milliGRAM(s)/deciliter  lactulose Syrup 10 Gram(s) Oral daily PRN constipation  ondansetron    Tablet 4 milliGRAM(s) Oral every 6 hours PRN for nausea    ALLERGIES:  Allergies    aspirin (Short breath)  Dilaudid (Short breath)  tetanus toxoid (Short breath)  Avelox (Short breath; Pruritus)  Valium (Short breath)  codeine (Short breath)  shellfish (Anaphylaxis)  cefepime (Anaphylaxis)  penicillin (Anaphylaxis)  iodine (Short breath; Swelling)    Intolerances      LABS:                        8.9    10.08 )-----------( 271      ( 23 Dec 2023 04:44 )             32.5     12-23    143  |  104  |  8.2  ----------------------------<  107<H>  4.0   |  29.0  |  0.54    Ca    8.4      23 Dec 2023 04:44  Phos  2.3     12-23  Mg     1.9     12-23    TPro  5.5<L>  /  Alb  2.7<L>  /  TBili  <0.2<L>  /  DBili  x   /  AST  16  /  ALT  9   /  AlkPhos  78  12-23      Urinalysis Basic - ( 23 Dec 2023 04:44 )    Color: x / Appearance: x / SG: x / pH: x  Gluc: 107 mg/dL / Ketone: x  / Bili: x / Urobili: x   Blood: x / Protein: x / Nitrite: x   Leuk Esterase: x / RBC: x / WBC x   Sq Epi: x / Non Sq Epi: x / Bacteria: x    Micro:    Culture - Blood (collected 12-21-23 @ 12:00)  Source: .Blood Blood-Peripheral  Gram Stain (12-24-23 @ 06:42):    Growth in anaerobic bottle: Gram Negative Rods  Preliminary Report (12-24-23 @ 06:42):    Growth in anaerobic bottle: Gram Negative Rods    Direct identification is available within approximately 3-5    hours either by Blood Panel Multiplexed PCR or Direct    MALDI-TOF. Details: [email or web address]  Organism: Blood Culture PCR (12-24-23 @ 07:37)  Organism: Blood Culture PCR (12-24-23 @ 07:37)      Method Type: PCR      -  Proteus species: Detec      -  ESBL: Detec      -  CTX-M Resistance Marker: Detec    Culture - Blood (collected 12-21-23 @ 11:50)  Source: .Blood Blood-Peripheral  Preliminary Report (12-23-23 @ 16:01):    No growth at 48 Hours          RADIOLOGY & ADDITIONAL TESTS: Reviewed. INCOMPLETE    73-year-old female patient with a history of A-fib, COPD, TAVR,  tracheomalacia, aortic dissection, ostomy secondary to colorectal cancer, adrenal insufficiency, on steroids, presents to the ED complaining of nausea, vomiting and SOB.    INTERVAL HPI/OVERNIGHT EVENTS:    SUBJECTIVE: Patient seen and examined at bedside.     ROS: All negative except as listed above.    VITAL SIGNS:  ICU Vital Signs Last 24 Hrs  T(C): 37.3 (24 Dec 2023 05:30), Max: 37.3 (24 Dec 2023 05:30)  T(F): 99.1 (24 Dec 2023 05:30), Max: 99.1 (24 Dec 2023 05:30)  HR: 74 (24 Dec 2023 05:30) (65 - 74)  BP: 146/80 (24 Dec 2023 05:30) (123/73 - 155/71)  BP(mean): --  ABP: --  ABP(mean): --  RR: 18 (24 Dec 2023 05:30) (18 - 18)  SpO2: 100% (24 Dec 2023 05:30) (94% - 100%)    O2 Parameters below as of 24 Dec 2023 05:30  Patient On (Oxygen Delivery Method): nasal cannula  O2 Flow (L/min): 3    Plateau pressure:   P/F ratio:     12-23 @ 07:01  -  12-24 @ 07:00  --------------------------------------------------------  IN: 0 mL / OUT: 1100 mL / NET: -1100 mL      CAPILLARY BLOOD GLUCOSE  POCT Blood Glucose.: 138 mg/dL (23 Dec 2023 21:12)      ECG: reviewed.    PHYSICAL EXAM:    GENERAL: NAD  HEAD: Swollen Eyelids, no JVD  EYES: EOMI, PERRLA, conjunctiva and sclera clear  NECK: Supple, trachea midline, no JVD  HEART: Irregular rate and rhythm, no murmurs,   LUNGS: CTAB  ABDOMEN: Soft, nontender, nondistended, colostomy in place  EXTREMITIES: No edema  NERVOUS SYSTEM:  A&Ox2, no focal deficits     MEDICATIONS:  MEDICATIONS  (STANDING):  albuterol/ipratropium for Nebulization 3 milliLiter(s) Nebulizer every 6 hours  apixaban 5 milliGRAM(s) Oral every 12 hours  atorvastatin 20 milliGRAM(s) Oral at bedtime  clopidogrel Tablet 75 milliGRAM(s) Oral daily  dextrose 5%. 1000 milliLiter(s) (100 mL/Hr) IV Continuous <Continuous>  dextrose 5%. 1000 milliLiter(s) (50 mL/Hr) IV Continuous <Continuous>  dextrose 50% Injectable 25 Gram(s) IV Push once  dextrose 50% Injectable 25 Gram(s) IV Push once  dextrose 50% Injectable 12.5 Gram(s) IV Push once  diphenhydrAMINE Injectable 50 milliGRAM(s) IV Push daily  ertapenem  IVPB 1000 milliGRAM(s) IV Intermittent every 24 hours  famotidine    Tablet 20 milliGRAM(s) Oral daily  gabapentin 100 milliGRAM(s) Oral every 8 hours  glucagon  Injectable 1 milliGRAM(s) IntraMuscular once  guaiFENesin ER 1200 milliGRAM(s) Oral every 12 hours  influenza  Vaccine (HIGH DOSE) 0.7 milliLiter(s) IntraMuscular once  insulin glargine Injectable (LANTUS) 16 Unit(s) SubCutaneous at bedtime  insulin lispro (ADMELOG) corrective regimen sliding scale   SubCutaneous three times a day before meals  insulin lispro Injectable (ADMELOG) 5 Unit(s) SubCutaneous three times a day before meals  levETIRAcetam 500 milliGRAM(s) Oral two times a day  methylPREDNISolone 8 milliGRAM(s) Oral daily  mexiletine 200 milliGRAM(s) Oral every 8 hours  montelukast 10 milliGRAM(s) Oral daily  tiotropium 2.5 MICROgram(s) Inhaler 2 Puff(s) Inhalation daily    MEDICATIONS  (PRN):  dextrose Oral Gel 15 Gram(s) Oral once PRN Blood Glucose LESS THAN 70 milliGRAM(s)/deciliter  lactulose Syrup 10 Gram(s) Oral daily PRN constipation  ondansetron    Tablet 4 milliGRAM(s) Oral every 6 hours PRN for nausea    ALLERGIES:  Allergies    aspirin (Short breath)  Dilaudid (Short breath)  tetanus toxoid (Short breath)  Avelox (Short breath; Pruritus)  Valium (Short breath)  codeine (Short breath)  shellfish (Anaphylaxis)  cefepime (Anaphylaxis)  penicillin (Anaphylaxis)  iodine (Short breath; Swelling)    Intolerances      LABS:                        8.9    10.08 )-----------( 271      ( 23 Dec 2023 04:44 )             32.5     12-23    143  |  104  |  8.2  ----------------------------<  107<H>  4.0   |  29.0  |  0.54    Ca    8.4      23 Dec 2023 04:44  Phos  2.3     12-23  Mg     1.9     12-23    TPro  5.5<L>  /  Alb  2.7<L>  /  TBili  <0.2<L>  /  DBili  x   /  AST  16  /  ALT  9   /  AlkPhos  78  12-23      Urinalysis Basic - ( 23 Dec 2023 04:44 )    Color: x / Appearance: x / SG: x / pH: x  Gluc: 107 mg/dL / Ketone: x  / Bili: x / Urobili: x   Blood: x / Protein: x / Nitrite: x   Leuk Esterase: x / RBC: x / WBC x   Sq Epi: x / Non Sq Epi: x / Bacteria: x    Micro:    Culture - Blood (collected 12-21-23 @ 12:00)  Source: .Blood Blood-Peripheral  Gram Stain (12-24-23 @ 06:42):    Growth in anaerobic bottle: Gram Negative Rods  Preliminary Report (12-24-23 @ 06:42):    Growth in anaerobic bottle: Gram Negative Rods    Direct identification is available within approximately 3-5    hours either by Blood Panel Multiplexed PCR or Direct    MALDI-TOF. Details: [email or web address]  Organism: Blood Culture PCR (12-24-23 @ 07:37)  Organism: Blood Culture PCR (12-24-23 @ 07:37)      Method Type: PCR      -  Proteus species: Detec      -  ESBL: Detec      -  CTX-M Resistance Marker: Detec    Culture - Blood (collected 12-21-23 @ 11:50)  Source: .Blood Blood-Peripheral  Preliminary Report (12-23-23 @ 16:01):    No growth at 48 Hours          RADIOLOGY & ADDITIONAL TESTS: Reviewed. 73-year-old female patient with a history of A-fib, COPD, TAVR,  tracheomalacia, aortic dissection, ostomy secondary to colorectal cancer, adrenal insufficiency, on steroids, presents to the ED complaining of nausea, vomiting and SOB.    INTERVAL HPI/OVERNIGHT EVENTS:  - No acute events overnight    SUBJECTIVE:   - Patient seen and examined at bedside.   - Reports presence of Left heel pain.   - States pain has been presents since admission  - rated 10/10, throbbing, stated to be worsening.     ROS: All negative except as listed above.    VITAL SIGNS:  ICU Vital Signs Last 24 Hrs  T(C): 37.3 (24 Dec 2023 05:30), Max: 37.3 (24 Dec 2023 05:30)  T(F): 99.1 (24 Dec 2023 05:30), Max: 99.1 (24 Dec 2023 05:30)  HR: 74 (24 Dec 2023 05:30) (65 - 74)  BP: 146/80 (24 Dec 2023 05:30) (123/73 - 155/71)  RR: 18 (24 Dec 2023 05:30) (18 - 18)  SpO2: 100% (24 Dec 2023 05:30) (94% - 100%)    O2 Parameters below as of 24 Dec 2023 05:30  Patient On (Oxygen Delivery Method): nasal cannula  O2 Flow (L/min): 3    Plateau pressure:   P/F ratio:     12-23 @ 07:01  -  12-24 @ 07:00  --------------------------------------------------------  IN: 0 mL / OUT: 1100 mL / NET: -1100 mL    CAPILLARY BLOOD GLUCOSE  POCT Blood Glucose.: 138 mg/dL (23 Dec 2023 21:12)      ECG: reviewed.    PHYSICAL EXAM:    GENERAL: NAD  HEAD: Swollen Eyelids, no JVD  EYES: EOMI, PERRLA, conjunctiva and sclera clear  NECK: Supple, trachea midline, no JVD  HEART: Irregular rate and rhythm, no murmurs,   LUNGS: CTAB  ABDOMEN: Soft, nontender, nondistended, colostomy in place  EXTREMITIES: No edema  NERVOUS SYSTEM:  A&Ox2, no focal deficits     MEDICATIONS:  MEDICATIONS  (STANDING):  albuterol/ipratropium for Nebulization 3 milliLiter(s) Nebulizer every 6 hours  apixaban 5 milliGRAM(s) Oral every 12 hours  atorvastatin 20 milliGRAM(s) Oral at bedtime  clopidogrel Tablet 75 milliGRAM(s) Oral daily  dextrose 5%. 1000 milliLiter(s) (100 mL/Hr) IV Continuous <Continuous>  dextrose 5%. 1000 milliLiter(s) (50 mL/Hr) IV Continuous <Continuous>  dextrose 50% Injectable 25 Gram(s) IV Push once  dextrose 50% Injectable 25 Gram(s) IV Push once  dextrose 50% Injectable 12.5 Gram(s) IV Push once  diphenhydrAMINE Injectable 50 milliGRAM(s) IV Push daily  ertapenem  IVPB 1000 milliGRAM(s) IV Intermittent every 24 hours  famotidine    Tablet 20 milliGRAM(s) Oral daily  gabapentin 100 milliGRAM(s) Oral every 8 hours  glucagon  Injectable 1 milliGRAM(s) IntraMuscular once  guaiFENesin ER 1200 milliGRAM(s) Oral every 12 hours  influenza  Vaccine (HIGH DOSE) 0.7 milliLiter(s) IntraMuscular once  insulin glargine Injectable (LANTUS) 16 Unit(s) SubCutaneous at bedtime  insulin lispro (ADMELOG) corrective regimen sliding scale   SubCutaneous three times a day before meals  insulin lispro Injectable (ADMELOG) 5 Unit(s) SubCutaneous three times a day before meals  levETIRAcetam 500 milliGRAM(s) Oral two times a day  methylPREDNISolone 8 milliGRAM(s) Oral daily  mexiletine 200 milliGRAM(s) Oral every 8 hours  montelukast 10 milliGRAM(s) Oral daily  tiotropium 2.5 MICROgram(s) Inhaler 2 Puff(s) Inhalation daily    MEDICATIONS  (PRN):  dextrose Oral Gel 15 Gram(s) Oral once PRN Blood Glucose LESS THAN 70 milliGRAM(s)/deciliter  lactulose Syrup 10 Gram(s) Oral daily PRN constipation  ondansetron    Tablet 4 milliGRAM(s) Oral every 6 hours PRN for nausea    ALLERGIES:  Allergies    aspirin (Short breath)  Dilaudid (Short breath)  tetanus toxoid (Short breath)  Avelox (Short breath; Pruritus)  Valium (Short breath)  codeine (Short breath)  shellfish (Anaphylaxis)  cefepime (Anaphylaxis)  penicillin (Anaphylaxis)  iodine (Short breath; Swelling)    Intolerances      LABS:                        8.9    10.08 )-----------( 271      ( 23 Dec 2023 04:44 )             32.5     12-23    143  |  104  |  8.2  ----------------------------<  107<H>  4.0   |  29.0  |  0.54    Ca    8.4      23 Dec 2023 04:44  Phos  2.3     12-23  Mg     1.9     12-23    TPro  5.5<L>  /  Alb  2.7<L>  /  TBili  <0.2<L>  /  DBili  x   /  AST  16  /  ALT  9   /  AlkPhos  78  12-23      Urinalysis Basic - ( 23 Dec 2023 04:44 )    Color: x / Appearance: x / SG: x / pH: x  Gluc: 107 mg/dL / Ketone: x  / Bili: x / Urobili: x   Blood: x / Protein: x / Nitrite: x   Leuk Esterase: x / RBC: x / WBC x   Sq Epi: x / Non Sq Epi: x / Bacteria: x    Micro:    Culture - Blood (collected 12-21-23 @ 12:00)  Source: .Blood Blood-Peripheral  Gram Stain (12-24-23 @ 06:42):    Growth in anaerobic bottle: Gram Negative Rods  Preliminary Report (12-24-23 @ 06:42):    Growth in anaerobic bottle: Gram Negative Rods    Direct identification is available within approximately 3-5    hours either by Blood Panel Multiplexed PCR or Direct    MALDI-TOF. Details: [email or web address]  Organism: Blood Culture PCR (12-24-23 @ 07:37)  Organism: Blood Culture PCR (12-24-23 @ 07:37)      Method Type: PCR      -  Proteus species: Detec      -  ESBL: Detec      -  CTX-M Resistance Marker: Detec    Culture - Blood (collected 12-21-23 @ 11:50)  Source: .Blood Blood-Peripheral  Preliminary Report (12-23-23 @ 16:01):    No growth at 48 Hours          RADIOLOGY & ADDITIONAL TESTS: Reviewed. 73-year-old female patient with a history of A-fib, COPD, TAVR,  tracheomalacia, aortic dissection, ostomy secondary to colorectal cancer, adrenal insufficiency, on steroids, presents to the ED complaining of nausea, vomiting and SOB.    INTERVAL HPI/OVERNIGHT EVENTS:  - No acute events overnight    SUBJECTIVE:   - Patient seen and examined at bedside.   - Reports presence of Left heel pain.   - States pain has been presents since admission  - rated 10/10, throbbing, stated to be worsening.     ROS: All negative except as listed above.    VITAL SIGNS:  ICU Vital Signs Last 24 Hrs  T(C): 37.3 (24 Dec 2023 05:30), Max: 37.3 (24 Dec 2023 05:30)  T(F): 99.1 (24 Dec 2023 05:30), Max: 99.1 (24 Dec 2023 05:30)  HR: 74 (24 Dec 2023 05:30) (65 - 74)  BP: 146/80 (24 Dec 2023 05:30) (123/73 - 155/71)  RR: 18 (24 Dec 2023 05:30) (18 - 18)  SpO2: 100% (24 Dec 2023 05:30) (94% - 100%)    O2 Parameters below as of 24 Dec 2023 05:30  Patient On (Oxygen Delivery Method): nasal cannula  O2 Flow (L/min): 3    Plateau pressure:   P/F ratio:     12-23 @ 07:01  -  12-24 @ 07:00  --------------------------------------------------------  IN: 0 mL / OUT: 1100 mL / NET: -1100 mL    CAPILLARY BLOOD GLUCOSE  POCT Blood Glucose.: 138 mg/dL (23 Dec 2023 21:12)      ECG: reviewed.    PHYSICAL EXAM:    GENERAL: NAD  HEAD: Swollen Eyelids, no JVD  EYES: EOMI, PERRLA, conjunctiva and sclera clear  NECK: Supple, trachea midline, no JVD  HEART: Irregular rate and rhythm, no murmurs,   LUNGS: CTAB  ABDOMEN: Soft, nontender, nondistended, colostomy in place  EXTREMITIES: No edema  NERVOUS SYSTEM:  A&Ox2, no focal deficits     MEDICATIONS:  MEDICATIONS  (STANDING):  albuterol/ipratropium for Nebulization 3 milliLiter(s) Nebulizer every 6 hours  apixaban 5 milliGRAM(s) Oral every 12 hours  atorvastatin 20 milliGRAM(s) Oral at bedtime  clopidogrel Tablet 75 milliGRAM(s) Oral daily  dextrose 5%. 1000 milliLiter(s) (100 mL/Hr) IV Continuous <Continuous>  dextrose 5%. 1000 milliLiter(s) (50 mL/Hr) IV Continuous <Continuous>  dextrose 50% Injectable 25 Gram(s) IV Push once  dextrose 50% Injectable 25 Gram(s) IV Push once  dextrose 50% Injectable 12.5 Gram(s) IV Push once  diphenhydrAMINE Injectable 50 milliGRAM(s) IV Push daily  ertapenem  IVPB 1000 milliGRAM(s) IV Intermittent every 24 hours  famotidine    Tablet 20 milliGRAM(s) Oral daily  gabapentin 100 milliGRAM(s) Oral every 8 hours  glucagon  Injectable 1 milliGRAM(s) IntraMuscular once  guaiFENesin ER 1200 milliGRAM(s) Oral every 12 hours  influenza  Vaccine (HIGH DOSE) 0.7 milliLiter(s) IntraMuscular once  insulin glargine Injectable (LANTUS) 16 Unit(s) SubCutaneous at bedtime  insulin lispro (ADMELOG) corrective regimen sliding scale   SubCutaneous three times a day before meals  insulin lispro Injectable (ADMELOG) 5 Unit(s) SubCutaneous three times a day before meals  levETIRAcetam 500 milliGRAM(s) Oral two times a day  methylPREDNISolone 8 milliGRAM(s) Oral daily  mexiletine 200 milliGRAM(s) Oral every 8 hours  montelukast 10 milliGRAM(s) Oral daily  tiotropium 2.5 MICROgram(s) Inhaler 2 Puff(s) Inhalation daily    MEDICATIONS  (PRN):  dextrose Oral Gel 15 Gram(s) Oral once PRN Blood Glucose LESS THAN 70 milliGRAM(s)/deciliter  lactulose Syrup 10 Gram(s) Oral daily PRN constipation  ondansetron    Tablet 4 milliGRAM(s) Oral every 6 hours PRN for nausea    ALLERGIES:  Allergies    aspirin (Short breath)  Dilaudid (Short breath)  tetanus toxoid (Short breath)  Avelox (Short breath; Pruritus)  Valium (Short breath)  codeine (Short breath)  shellfish (Anaphylaxis)  cefepime (Anaphylaxis)  penicillin (Anaphylaxis)  iodine (Short breath; Swelling)    Intolerances      LABS:                        8.9    10.08 )-----------( 271      ( 23 Dec 2023 04:44 )             32.5     12-23    143  |  104  |  8.2  ----------------------------<  107<H>  4.0   |  29.0  |  0.54    Ca    8.4      23 Dec 2023 04:44  Phos  2.3     12-23  Mg     1.9     12-23    TPro  5.5<L>  /  Alb  2.7<L>  /  TBili  <0.2<L>  /  DBili  x   /  AST  16  /  ALT  9   /  AlkPhos  78  12-23      Urinalysis Basic - ( 23 Dec 2023 04:44 )    Color: x / Appearance: x / SG: x / pH: x  Gluc: 107 mg/dL / Ketone: x  / Bili: x / Urobili: x   Blood: x / Protein: x / Nitrite: x   Leuk Esterase: x / RBC: x / WBC x   Sq Epi: x / Non Sq Epi: x / Bacteria: x    Micro:    Culture - Blood (collected 12-21-23 @ 12:00)  Source: .Blood Blood-Peripheral  Gram Stain (12-24-23 @ 06:42):    Growth in anaerobic bottle: Gram Negative Rods  Preliminary Report (12-24-23 @ 06:42):    Growth in anaerobic bottle: Gram Negative Rods    Direct identification is available within approximately 3-5    hours either by Blood Panel Multiplexed PCR or Direct    MALDI-TOF. Details: [email or web address]  Organism: Blood Culture PCR (12-24-23 @ 07:37)  Organism: Blood Culture PCR (12-24-23 @ 07:37)      Method Type: PCR      -  Proteus species: Detec      -  ESBL: Detec      -  CTX-M Resistance Marker: Detec    Culture - Blood (collected 12-21-23 @ 11:50)  Source: .Blood Blood-Peripheral  Preliminary Report (12-23-23 @ 16:01):    No growth at 48 Hours          RADIOLOGY & ADDITIONAL TESTS: Reviewed.

## 2023-12-24 NOTE — PROGRESS NOTE ADULT - SUBJECTIVE AND OBJECTIVE BOX
[facility]  INFECTIOUS DISEASES at [place] / [place] / [place]  =======================================================                               Allen Ibanez MD#   Iam Benjamin MD*                             Opal Da Silva MD*   Michelle Dowell MD*                              Professor Emeritus:  Dr Choco Hewitt MD^            Diplomates American Board of Internal Medicine & Infectious Diseases                # [place] Office - Appt - Tel  [phone] Fax [phone]                * [place] Office - Appt - Tel [phone] Fax [phone]                      ^[place] Office - Tel  [phone] Fax [phone]                                  Hospital Consult line:  [phone]  =======================================================    RAYRAY RODRIGUEZ [id number]    Follow up: aspiration PNA  Recalled fro Gram negative rods in blood culture    No fevers since 12/21    c/o left foot heel pain       Allergies:  aspirin (Short breath)  Dilaudid (Short breath)  tetanus toxoid (Short breath)  Avelox (Short breath; Pruritus)  Valium (Short breath)  codeine (Short breath)  shellfish (Anaphylaxis)  cefepime (Anaphylaxis)  penicillin (Anaphylaxis)  iodine (Short breath; Swelling)      REVIEW OF SYSTEMS:  Unable to to assess. Patient is confused and not answering questions appropriately       Physical Exam:  GEN: NAD  HEENT: normocephalic and atraumatic.   NECK: Supple.   LUNGS: CTA B/L   HEART: RRR  ABDOMEN: Soft, NT, ND.  +BS.    : No CVA tenderness  EXTREMITIES: Without  edema.  MSK: No joint swelling  NEUROLOGIC: confused   SKIN: Left foot heel with raised area         Vitals:  T(F): 99 (24 Dec 2023 08:49), Max: 99.1 (24 Dec 2023 05:30)  HR: 76 (24 Dec 2023 08:49)  BP: 138/74 (24 Dec 2023 08:49)  RR: 18 (24 Dec 2023 08:49)  SpO2: 100% (24 Dec 2023 08:28) (94% - 100%)  temp max in last 48H T(F): , Max: 99.1 (12-24-23 @ 05:30)    Current Antibiotics:  ertapenem  IVPB 1000 milliGRAM(s) IV Intermittent every 24 hours    Other medications:  albuterol/ipratropium for Nebulization 3 milliLiter(s) Nebulizer every 6 hours  apixaban 5 milliGRAM(s) Oral every 12 hours  atorvastatin 20 milliGRAM(s) Oral at bedtime  clopidogrel Tablet 75 milliGRAM(s) Oral daily  dextrose 5%. 1000 milliLiter(s) IV Continuous <Continuous>  dextrose 5%. 1000 milliLiter(s) IV Continuous <Continuous>  dextrose 50% Injectable 25 Gram(s) IV Push once  dextrose 50% Injectable 25 Gram(s) IV Push once  dextrose 50% Injectable 12.5 Gram(s) IV Push once  diphenhydrAMINE Injectable 50 milliGRAM(s) IV Push daily  famotidine    Tablet 20 milliGRAM(s) Oral daily  gabapentin 100 milliGRAM(s) Oral every 8 hours  glucagon  Injectable 1 milliGRAM(s) IntraMuscular once  guaiFENesin ER 1200 milliGRAM(s) Oral every 12 hours  influenza  Vaccine (HIGH DOSE) 0.7 milliLiter(s) IntraMuscular once  insulin glargine Injectable (LANTUS) 16 Unit(s) SubCutaneous at bedtime  insulin lispro (ADMELOG) corrective regimen sliding scale   SubCutaneous three times a day before meals  insulin lispro Injectable (ADMELOG) 5 Unit(s) SubCutaneous three times a day before meals  levETIRAcetam 500 milliGRAM(s) Oral two times a day  methylPREDNISolone 8 milliGRAM(s) Oral daily  mexiletine 200 milliGRAM(s) Oral every 8 hours  montelukast 10 milliGRAM(s) Oral daily  tiotropium 2.5 MICROgram(s) Inhaler 2 Puff(s) Inhalation daily                            8.8    10.10 )-----------( 321      ( 24 Dec 2023 07:57 )             32.0     12-23    143  |  104  |  8.2  ----------------------------<  107<H>  4.0   |  29.0  |  0.54    Ca    8.4      23 Dec 2023 04:44  Phos  2.3     12-23  Mg     1.9     12-23    TPro  5.5<L>  /  Alb  2.7<L>  /  TBili  <0.2<L>  /  DBili  x   /  AST  16  /  ALT  9   /  AlkPhos  78  12-23    RECENT CULTURES:  12-21 @ 17:07    RVP  NotDetec    12-21 @ 12:00 .Blood Blood-Peripheral Blood Culture PCR    Growth in anaerobic bottle: Gram Negative Rods  Direct identification is available within approximately 3-5  hours either by Blood Panel Multiplexed PCR or Direct  MALDI-TOF. Details: [email or web address]  Growth in anaerobic bottle: Gram Negative Rods    12-21 @ 11:50 .Blood Blood-Peripheral     No growth at 48 Hours      WBC Count: 10.10 K/uL (12-24-23 @ 07:57)  WBC Count: 10.08 K/uL (12-23-23 @ 04:44)  WBC Count: 24.77 K/uL (12-21-23 @ 12:00)    Creatinine: 0.54 mg/dL (12-23-23 @ 04:44)  Creatinine: 0.94 mg/dL (12-21-23 @ 12:00)    Procalcitonin, Serum: 0.52 ng/mL (12-23-23 @ 04:44)     SARS-CoV-2: NotDetec (12-21-23 @ 17:07)  SARS-CoV-2 Result: NotDete (12-21-23 @ 12:00)          < from: CT Chest No Cont (12.21.23 @ 14:35) >  ACC: [id number] EXAM:  CT CHEST   ORDERED BY: WILLIAM WIEDEMANN     ACC: [id number] EXAM:  CT ABDOMEN AND PELVIS   ORDERED BY: MICHAEL COX     PROCEDURE DATE:  12/21/2023          INTERPRETATION:  CT CHEST, ABDOMEN AND PELVIS WITHOUT CONTRAST    Clinical Indication: Pneumonia.  Vomiting and fevers with abd pain.   Patient has colostomy bag with no output for 24 hours, evaluate for   obstruction.  History of anal squamous cell carcinoma.    Technique: Axial CT images of the chest, abdomen and pelvis were obtained   from the lung apices to the pubic symphysis without oral or IV contrast.    Coronal and sagittal reformatted images were created and reviewed.    Comparison:  Prior CT of the chest from 10/28/2023, CT of the chest from   9/28/2023,CT abdomen and pelvis from 8/23/2023.  CT of the chest,   abdomen and pelvis from 9/1/2023, 8/1/2023.  CT of the abdomen and pelvis   from 9/18/2017.  CT of the chest from 9/22/2016.    Findings:  CHEST:    LUNGS AND LARGE AIRWAYS:   The tracheobronchial tree is patent. Bilateral   patchy airspace opacities are noted, right lung worse than left, and most   prominently seen in the right middle lobe; findings are significantly   worsened compared to the prior study from 10/28/2023. Trace secretions  are seen posteriorly in the trachea. Mild right apical paraseptal   emphysematous changes.    PLEURA: No pleural effusion. No pneumothorax.    VESSELS: Status post ascending aortic repair and aortic valve replacement   with redemonstrated valve-in-valve TAVR. Known residual dissection   involving the thoracoabdominal aorta is better seen on recent   contrast-enhanced CT from August and September, 2023. The pulmonary   artery is mildly enlarged, correlate for pulmonary artery hypertension.    HEART: Heart size is normal. No pericardial effusion.  Coronary artery   calcifications.    MEDIASTINUM AND MARANDA: There is no mediastinal lymphadenopathy.    Evaluation for hilar lymphadenopathy is limited without IV contrast,   however there is no gross hilar lymphadenopathy.    CHEST WALL AND LOWER NECK: There is no axillary lymphadenopathy.    ABDOMEN/PELVIS:  LIVER: Unenhanced liver appears within normal limits.  BILE DUCTS: Normal caliber.  GALLBLADDER: Within normal limits.  SPLEEN: Unenhancedspleen appears within normal limits.  PANCREAS: Unenhanced pancreas again appears atrophic.  Remonstrated   pancreatic hypodensities, measuring up to 2.4 cm at the pancreatic tail,   stable from multiple prior studies.  ADRENALS: Unenhanced adrenals appear within normal limits.  KIDNEYS/URETERS: No hydroureteronephrosis.  Known, partially calcified   left renal cysts appear grossly patent.    BLADDER: Appears grossly within normal limits, partially obscured by   metallic streak artifact from orthopedic hardware in the pubic symphysis    REPRODUCTIVE ORGANS: Hysterectomy.    BOWEL:  Status post abdominoperineal resection with left-sided colostomy.   Moderate to large amount of stool throughout the colon extending to the   ostomy, correlate forconstipation. No evidence for bowel obstruction or   inflammation.    Appendix is normal.    PERITONEUM: No ascites. Postsurgical presacral changes suboptimally   assessed however without significant change from prior studies.  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  Scattered   atherosclerotic calcifications of the aortoiliac tree and proximal thigh   vasculature.    LYMPH NODES: No lymphadenopathy.  ABDOMINAL WALL: Postsurgical changes.  BONES: No suspicious osseous lesion. Several densely sclerotic foci   throughout the vertebral bodies and ribs are stable from multiple prior   studiesand probably represent bone islands. Median sternotomy wires are   midline appear intact. Status post plate-screw fixation of pubic   symphysis fracture.  Redemonstrated horizontally oriented orthopedic   screw traversing the right ilium and sacroiliac joint  Mild lumbar   scoliosis, convex to the left with the apex at L4-L5.  Mild spinal canal   stenosis at L3-L4 and L4-L5 with posterior disc bulges.   Avascular   necrosis in both femoral heads again seen.    OTHER:  None    IMPRESSION:  Multifocal pneumonia, as detailed above; findings are progressed since   the prior CT of the chest from 10/28/2023. Recommend clinical correlation   and follow-up imaging to document resolution.    Status post abdominoperineal resection with left-sided colostomy.   Moderate to large amount of stool throughout the colon extending to the   ostomy, correlate for constipation. No evidence for bowel obstruction or   inflammation.    Appendix is normal.    Status post ascending aortic repair and aortic valve replacement.    Additional findings, as above.    --- End of Report ---    < end of copied text >               [facility]  INFECTIOUS DISEASES at [place] / [place] / [place]  =======================================================                               Allen Ibanez MD#   Iam Benjamin MD*                             Opal Da Silva MD*   Michelle Dowell MD*                              Professor Emeritus:  Dr Choco Hewitt MD^            Diplomates American Board of Internal Medicine & Infectious Diseases                # [place] Office - Appt - Tel  [phone] Fax [phone]                * [place] Office - Appt - Tel [phone] Fax [phone]                      ^[place] Office - Tel  [phone] Fax [phone]                                  Hospital Consult line:  [phone]  =======================================================    RAYRAY RODRIGUEZ [id number]    Follow up: aspiration PNA  Recalled fro Gram negative rods in blood culture    No fevers since 12/21    c/o left foot heel pain       Allergies:  aspirin (Short breath)  Dilaudid (Short breath)  tetanus toxoid (Short breath)  Avelox (Short breath; Pruritus)  Valium (Short breath)  codeine (Short breath)  shellfish (Anaphylaxis)  cefepime (Anaphylaxis)  penicillin (Anaphylaxis)  iodine (Short breath; Swelling)      REVIEW OF SYSTEMS:  Unable to to assess. Patient is confused and not answering questions appropriately       Physical Exam:  GEN: NAD  HEENT: normocephalic and atraumatic.   NECK: Supple.   LUNGS: CTA B/L   HEART: RRR  ABDOMEN: Soft, NT, ND.  +BS.    : No CVA tenderness  EXTREMITIES: Without  edema.  MSK: No joint swelling  NEUROLOGIC: confused   SKIN: Left foot heel with raised area         Vitals:  T(F): 99 (24 Dec 2023 08:49), Max: 99.1 (24 Dec 2023 05:30)  HR: 76 (24 Dec 2023 08:49)  BP: 138/74 (24 Dec 2023 08:49)  RR: 18 (24 Dec 2023 08:49)  SpO2: 100% (24 Dec 2023 08:28) (94% - 100%)  temp max in last 48H T(F): , Max: 99.1 (12-24-23 @ 05:30)    Current Antibiotics:  ertapenem  IVPB 1000 milliGRAM(s) IV Intermittent every 24 hours    Other medications:  albuterol/ipratropium for Nebulization 3 milliLiter(s) Nebulizer every 6 hours  apixaban 5 milliGRAM(s) Oral every 12 hours  atorvastatin 20 milliGRAM(s) Oral at bedtime  clopidogrel Tablet 75 milliGRAM(s) Oral daily  dextrose 5%. 1000 milliLiter(s) IV Continuous <Continuous>  dextrose 5%. 1000 milliLiter(s) IV Continuous <Continuous>  dextrose 50% Injectable 25 Gram(s) IV Push once  dextrose 50% Injectable 25 Gram(s) IV Push once  dextrose 50% Injectable 12.5 Gram(s) IV Push once  diphenhydrAMINE Injectable 50 milliGRAM(s) IV Push daily  famotidine    Tablet 20 milliGRAM(s) Oral daily  gabapentin 100 milliGRAM(s) Oral every 8 hours  glucagon  Injectable 1 milliGRAM(s) IntraMuscular once  guaiFENesin ER 1200 milliGRAM(s) Oral every 12 hours  influenza  Vaccine (HIGH DOSE) 0.7 milliLiter(s) IntraMuscular once  insulin glargine Injectable (LANTUS) 16 Unit(s) SubCutaneous at bedtime  insulin lispro (ADMELOG) corrective regimen sliding scale   SubCutaneous three times a day before meals  insulin lispro Injectable (ADMELOG) 5 Unit(s) SubCutaneous three times a day before meals  levETIRAcetam 500 milliGRAM(s) Oral two times a day  methylPREDNISolone 8 milliGRAM(s) Oral daily  mexiletine 200 milliGRAM(s) Oral every 8 hours  montelukast 10 milliGRAM(s) Oral daily  tiotropium 2.5 MICROgram(s) Inhaler 2 Puff(s) Inhalation daily                            8.8    10.10 )-----------( 321      ( 24 Dec 2023 07:57 )             32.0     12-23    143  |  104  |  8.2  ----------------------------<  107<H>  4.0   |  29.0  |  0.54    Ca    8.4      23 Dec 2023 04:44  Phos  2.3     12-23  Mg     1.9     12-23    TPro  5.5<L>  /  Alb  2.7<L>  /  TBili  <0.2<L>  /  DBili  x   /  AST  16  /  ALT  9   /  AlkPhos  78  12-23    RECENT CULTURES:  12-21 @ 17:07    RVP  NotDetec    12-21 @ 12:00 .Blood Blood-Peripheral Blood Culture PCR    Growth in anaerobic bottle: Gram Negative Rods  Direct identification is available within approximately 3-5  hours either by Blood Panel Multiplexed PCR or Direct  MALDI-TOF. Details: [email or web address]  Growth in anaerobic bottle: Gram Negative Rods    12-21 @ 11:50 .Blood Blood-Peripheral     No growth at 48 Hours      WBC Count: 10.10 K/uL (12-24-23 @ 07:57)  WBC Count: 10.08 K/uL (12-23-23 @ 04:44)  WBC Count: 24.77 K/uL (12-21-23 @ 12:00)    Creatinine: 0.54 mg/dL (12-23-23 @ 04:44)  Creatinine: 0.94 mg/dL (12-21-23 @ 12:00)    Procalcitonin, Serum: 0.52 ng/mL (12-23-23 @ 04:44)     SARS-CoV-2: NotDetec (12-21-23 @ 17:07)  SARS-CoV-2 Result: NotDete (12-21-23 @ 12:00)          < from: CT Chest No Cont (12.21.23 @ 14:35) >  ACC: [id number] EXAM:  CT CHEST   ORDERED BY: WILLIAM WIEDEMANN     ACC: [id number] EXAM:  CT ABDOMEN AND PELVIS   ORDERED BY: MICHAEL COX     PROCEDURE DATE:  12/21/2023          INTERPRETATION:  CT CHEST, ABDOMEN AND PELVIS WITHOUT CONTRAST    Clinical Indication: Pneumonia.  Vomiting and fevers with abd pain.   Patient has colostomy bag with no output for 24 hours, evaluate for   obstruction.  History of anal squamous cell carcinoma.    Technique: Axial CT images of the chest, abdomen and pelvis were obtained   from the lung apices to the pubic symphysis without oral or IV contrast.    Coronal and sagittal reformatted images were created and reviewed.    Comparison:  Prior CT of the chest from 10/28/2023, CT of the chest from   9/28/2023,CT abdomen and pelvis from 8/23/2023.  CT of the chest,   abdomen and pelvis from 9/1/2023, 8/1/2023.  CT of the abdomen and pelvis   from 9/18/2017.  CT of the chest from 9/22/2016.    Findings:  CHEST:    LUNGS AND LARGE AIRWAYS:   The tracheobronchial tree is patent. Bilateral   patchy airspace opacities are noted, right lung worse than left, and most   prominently seen in the right middle lobe; findings are significantly   worsened compared to the prior study from 10/28/2023. Trace secretions  are seen posteriorly in the trachea. Mild right apical paraseptal   emphysematous changes.    PLEURA: No pleural effusion. No pneumothorax.    VESSELS: Status post ascending aortic repair and aortic valve replacement   with redemonstrated valve-in-valve TAVR. Known residual dissection   involving the thoracoabdominal aorta is better seen on recent   contrast-enhanced CT from August and September, 2023. The pulmonary   artery is mildly enlarged, correlate for pulmonary artery hypertension.    HEART: Heart size is normal. No pericardial effusion.  Coronary artery   calcifications.    MEDIASTINUM AND MARANDA: There is no mediastinal lymphadenopathy.    Evaluation for hilar lymphadenopathy is limited without IV contrast,   however there is no gross hilar lymphadenopathy.    CHEST WALL AND LOWER NECK: There is no axillary lymphadenopathy.    ABDOMEN/PELVIS:  LIVER: Unenhanced liver appears within normal limits.  BILE DUCTS: Normal caliber.  GALLBLADDER: Within normal limits.  SPLEEN: Unenhancedspleen appears within normal limits.  PANCREAS: Unenhanced pancreas again appears atrophic.  Remonstrated   pancreatic hypodensities, measuring up to 2.4 cm at the pancreatic tail,   stable from multiple prior studies.  ADRENALS: Unenhanced adrenals appear within normal limits.  KIDNEYS/URETERS: No hydroureteronephrosis.  Known, partially calcified   left renal cysts appear grossly patent.    BLADDER: Appears grossly within normal limits, partially obscured by   metallic streak artifact from orthopedic hardware in the pubic symphysis    REPRODUCTIVE ORGANS: Hysterectomy.    BOWEL:  Status post abdominoperineal resection with left-sided colostomy.   Moderate to large amount of stool throughout the colon extending to the   ostomy, correlate forconstipation. No evidence for bowel obstruction or   inflammation.    Appendix is normal.    PERITONEUM: No ascites. Postsurgical presacral changes suboptimally   assessed however without significant change from prior studies.  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  Scattered   atherosclerotic calcifications of the aortoiliac tree and proximal thigh   vasculature.    LYMPH NODES: No lymphadenopathy.  ABDOMINAL WALL: Postsurgical changes.  BONES: No suspicious osseous lesion. Several densely sclerotic foci   throughout the vertebral bodies and ribs are stable from multiple prior   studiesand probably represent bone islands. Median sternotomy wires are   midline appear intact. Status post plate-screw fixation of pubic   symphysis fracture.  Redemonstrated horizontally oriented orthopedic   screw traversing the right ilium and sacroiliac joint  Mild lumbar   scoliosis, convex to the left with the apex at L4-L5.  Mild spinal canal   stenosis at L3-L4 and L4-L5 with posterior disc bulges.   Avascular   necrosis in both femoral heads again seen.    OTHER:  None    IMPRESSION:  Multifocal pneumonia, as detailed above; findings are progressed since   the prior CT of the chest from 10/28/2023. Recommend clinical correlation   and follow-up imaging to document resolution.    Status post abdominoperineal resection with left-sided colostomy.   Moderate to large amount of stool throughout the colon extending to the   ostomy, correlate for constipation. No evidence for bowel obstruction or   inflammation.    Appendix is normal.    Status post ascending aortic repair and aortic valve replacement.    Additional findings, as above.    --- End of Report ---    < end of copied text >

## 2023-12-24 NOTE — PROGRESS NOTE ADULT - ASSESSMENT
73-year-old female patient with a history of A-fib, COPD, TAVR,  tracheomalacia, aortic dissection, ostomy secondary to colorectal cancer, adrenal insufficiency, on steroids, presents to the ED complaining of nausea, vomiting and SOB.  Pt is a poor historian and not able to provide a full history. Only reports not feeling well and worsening SOB with productive cough   Per daughter over the phone, was not feeling well, was confused, with decrease appetite, not drinking, had an episode of vomiting. In Ed, was febrile with elevated WBC, found to be septic.     #Sepsis secondary to multifocal PNA and likely COPD exacerbation   - Febrile, elevated WBC   - Pt with extensive allergies to antibiotics   - c/w Ertapenem + diphenhydramine 50mg IV daily, per ID will stop 12/25  - c/w tiotropium 2.5 micrograms 2 puff daily  - c/w albuterol/ipratropium 3 mL q6   - c/w Mucinex 1200 oral q12  - c/w methylprednisolone 8 mg daily  - c/w montelukast 10 mg dialy  - c/w Zofran 4mg q6  - ID recs appreciated    #Chronic afib   - c/w Eliquis 5mg q12  - c/w Mexiletine 200 mg q8     #Type 2 DM with neuropathy  - a1c ~9  - c/w Lantus 16 units and Admelog 5 TID   - c/w Gabapentin 100 mg q8     #CAD   - c/w Plavix 75mg daily  - c/w atorvastatin 20 mg at bedtime    #Seizures   -c/w levetiracetam 500mg BID    S/p Colostomy   c/w Senna daily     Dispo: IV Abx through 12/25 73-year-old female patient with a history of A-fib, COPD, TAVR,  tracheomalacia, aortic dissection, ostomy secondary to colorectal cancer, adrenal insufficiency, on steroids, presents to the ED complaining of nausea, vomiting and SOB.  Pt is a poor historian and not able to provide a full history. Only reports not feeling well and worsening SOB with productive cough   Per daughter over the phone, was not feeling well, was confused, with decrease appetite, not drinking, had an episode of vomiting. In Ed, was febrile with elevated WBC, found to be septic.     #Sepsis secondary to multifocal PNA and likely COPD exacerbation   - Febrile, elevated WBC   - Pt with extensive allergies to antibiotics   - c/w Ertapenem + diphenhydramine 50mg IV daily, per ID will stop 12/25  - c/w tiotropium 2.5 micrograms 2 puff daily  - c/w albuterol/ipratropium 3 mL q6   - c/w Mucinex 1200 oral q12  - c/w methylprednisolone 8 mg daily  - c/w montelukast 10 mg dialy  - c/w Zofran 4mg q6  - sent repeat BCx  - f/u repeat BCx  - ID recs appreciated    #Left foot pain  - will order CT L foot/ non contrast to rule out osteomyelitis   - will place podiatry consult for evaluation and recs    #Microcytic Anemia  - H/H noted to be 8.8/32.0  - MCV at 66.3   - ordered iron studies to be drawn in AM   - f/u iron studies  - c/w trend h/h     #Chronic afib   - c/w Eliquis 5mg q12  - c/w Mexiletine 200 mg q8     #Type 2 DM with neuropathy  - a1c ~9  - c/w Lantus 16 units and Admelog 5 TID   - c/w Gabapentin 100 mg q8     #CAD   - c/w Plavix 75mg daily  - c/w atorvastatin 20 mg at bedtime    #Seizures   -c/w levetiracetam 500mg BID    S/p Colostomy   c/w Senna daily     Dispo: IV Abx through 12/25

## 2023-12-25 LAB
ALBUMIN SERPL ELPH-MCNC: 3 G/DL — LOW (ref 3.3–5.2)
ALBUMIN SERPL ELPH-MCNC: 3 G/DL — LOW (ref 3.3–5.2)
ALP SERPL-CCNC: 78 U/L — SIGNIFICANT CHANGE UP (ref 40–120)
ALP SERPL-CCNC: 78 U/L — SIGNIFICANT CHANGE UP (ref 40–120)
ALT FLD-CCNC: 10 U/L — SIGNIFICANT CHANGE UP
ALT FLD-CCNC: 10 U/L — SIGNIFICANT CHANGE UP
ANION GAP SERPL CALC-SCNC: 11 MMOL/L — SIGNIFICANT CHANGE UP (ref 5–17)
ANION GAP SERPL CALC-SCNC: 11 MMOL/L — SIGNIFICANT CHANGE UP (ref 5–17)
AST SERPL-CCNC: 18 U/L — SIGNIFICANT CHANGE UP
AST SERPL-CCNC: 18 U/L — SIGNIFICANT CHANGE UP
BILIRUB SERPL-MCNC: <0.2 MG/DL — LOW (ref 0.4–2)
BILIRUB SERPL-MCNC: <0.2 MG/DL — LOW (ref 0.4–2)
BUN SERPL-MCNC: 9.9 MG/DL — SIGNIFICANT CHANGE UP (ref 8–20)
BUN SERPL-MCNC: 9.9 MG/DL — SIGNIFICANT CHANGE UP (ref 8–20)
CALCIUM SERPL-MCNC: 8.5 MG/DL — SIGNIFICANT CHANGE UP (ref 8.4–10.5)
CALCIUM SERPL-MCNC: 8.5 MG/DL — SIGNIFICANT CHANGE UP (ref 8.4–10.5)
CHLORIDE SERPL-SCNC: 101 MMOL/L — SIGNIFICANT CHANGE UP (ref 96–108)
CHLORIDE SERPL-SCNC: 101 MMOL/L — SIGNIFICANT CHANGE UP (ref 96–108)
CO2 SERPL-SCNC: 27 MMOL/L — SIGNIFICANT CHANGE UP (ref 22–29)
CO2 SERPL-SCNC: 27 MMOL/L — SIGNIFICANT CHANGE UP (ref 22–29)
CREAT SERPL-MCNC: 0.52 MG/DL — SIGNIFICANT CHANGE UP (ref 0.5–1.3)
CREAT SERPL-MCNC: 0.52 MG/DL — SIGNIFICANT CHANGE UP (ref 0.5–1.3)
EGFR: 97 ML/MIN/1.73M2 — SIGNIFICANT CHANGE UP
EGFR: 97 ML/MIN/1.73M2 — SIGNIFICANT CHANGE UP
FERRITIN SERPL-MCNC: 85 NG/ML — SIGNIFICANT CHANGE UP (ref 13–330)
FERRITIN SERPL-MCNC: 85 NG/ML — SIGNIFICANT CHANGE UP (ref 13–330)
GLUCOSE BLDC GLUCOMTR-MCNC: 194 MG/DL — HIGH (ref 70–99)
GLUCOSE BLDC GLUCOMTR-MCNC: 194 MG/DL — HIGH (ref 70–99)
GLUCOSE BLDC GLUCOMTR-MCNC: 223 MG/DL — HIGH (ref 70–99)
GLUCOSE BLDC GLUCOMTR-MCNC: 223 MG/DL — HIGH (ref 70–99)
GLUCOSE BLDC GLUCOMTR-MCNC: 240 MG/DL — HIGH (ref 70–99)
GLUCOSE BLDC GLUCOMTR-MCNC: 240 MG/DL — HIGH (ref 70–99)
GLUCOSE BLDC GLUCOMTR-MCNC: 264 MG/DL — HIGH (ref 70–99)
GLUCOSE BLDC GLUCOMTR-MCNC: 264 MG/DL — HIGH (ref 70–99)
GLUCOSE SERPL-MCNC: 208 MG/DL — HIGH (ref 70–99)
GLUCOSE SERPL-MCNC: 208 MG/DL — HIGH (ref 70–99)
HCT VFR BLD CALC: 32.8 % — LOW (ref 34.5–45)
HCT VFR BLD CALC: 32.8 % — LOW (ref 34.5–45)
HGB BLD-MCNC: 9.6 G/DL — LOW (ref 11.5–15.5)
HGB BLD-MCNC: 9.6 G/DL — LOW (ref 11.5–15.5)
IRON SATN MFR SERPL: 18 UG/DL — LOW (ref 37–145)
IRON SATN MFR SERPL: 18 UG/DL — LOW (ref 37–145)
IRON SATN MFR SERPL: 6 % — LOW (ref 14–50)
IRON SATN MFR SERPL: 6 % — LOW (ref 14–50)
MAGNESIUM SERPL-MCNC: 2 MG/DL — SIGNIFICANT CHANGE UP (ref 1.6–2.6)
MAGNESIUM SERPL-MCNC: 2 MG/DL — SIGNIFICANT CHANGE UP (ref 1.6–2.6)
MCHC RBC-ENTMCNC: 19.1 PG — LOW (ref 27–34)
MCHC RBC-ENTMCNC: 19.1 PG — LOW (ref 27–34)
MCHC RBC-ENTMCNC: 29.3 GM/DL — LOW (ref 32–36)
MCHC RBC-ENTMCNC: 29.3 GM/DL — LOW (ref 32–36)
MCV RBC AUTO: 65.2 FL — LOW (ref 80–100)
MCV RBC AUTO: 65.2 FL — LOW (ref 80–100)
PHOSPHATE SERPL-MCNC: 2.1 MG/DL — LOW (ref 2.4–4.7)
PHOSPHATE SERPL-MCNC: 2.1 MG/DL — LOW (ref 2.4–4.7)
PLATELET # BLD AUTO: 414 K/UL — HIGH (ref 150–400)
PLATELET # BLD AUTO: 414 K/UL — HIGH (ref 150–400)
POTASSIUM SERPL-MCNC: 4.6 MMOL/L — SIGNIFICANT CHANGE UP (ref 3.5–5.3)
POTASSIUM SERPL-MCNC: 4.6 MMOL/L — SIGNIFICANT CHANGE UP (ref 3.5–5.3)
POTASSIUM SERPL-SCNC: 4.6 MMOL/L — SIGNIFICANT CHANGE UP (ref 3.5–5.3)
POTASSIUM SERPL-SCNC: 4.6 MMOL/L — SIGNIFICANT CHANGE UP (ref 3.5–5.3)
PROT SERPL-MCNC: 6.1 G/DL — LOW (ref 6.6–8.7)
PROT SERPL-MCNC: 6.1 G/DL — LOW (ref 6.6–8.7)
RBC # BLD: 5.03 M/UL — SIGNIFICANT CHANGE UP (ref 3.8–5.2)
RBC # BLD: 5.03 M/UL — SIGNIFICANT CHANGE UP (ref 3.8–5.2)
RBC # FLD: 21.9 % — HIGH (ref 10.3–14.5)
RBC # FLD: 21.9 % — HIGH (ref 10.3–14.5)
SODIUM SERPL-SCNC: 139 MMOL/L — SIGNIFICANT CHANGE UP (ref 135–145)
SODIUM SERPL-SCNC: 139 MMOL/L — SIGNIFICANT CHANGE UP (ref 135–145)
TIBC SERPL-MCNC: 296 UG/DL — SIGNIFICANT CHANGE UP (ref 220–430)
TIBC SERPL-MCNC: 296 UG/DL — SIGNIFICANT CHANGE UP (ref 220–430)
TRANSFERRIN SERPL-MCNC: 207 MG/DL — SIGNIFICANT CHANGE UP (ref 192–382)
TRANSFERRIN SERPL-MCNC: 207 MG/DL — SIGNIFICANT CHANGE UP (ref 192–382)
WBC # BLD: 12.3 K/UL — HIGH (ref 3.8–10.5)
WBC # BLD: 12.3 K/UL — HIGH (ref 3.8–10.5)
WBC # FLD AUTO: 12.3 K/UL — HIGH (ref 3.8–10.5)
WBC # FLD AUTO: 12.3 K/UL — HIGH (ref 3.8–10.5)

## 2023-12-25 PROCEDURE — 74177 CT ABD & PELVIS W/CONTRAST: CPT | Mod: 26

## 2023-12-25 PROCEDURE — 99232 SBSQ HOSP IP/OBS MODERATE 35: CPT

## 2023-12-25 PROCEDURE — 99233 SBSQ HOSP IP/OBS HIGH 50: CPT

## 2023-12-25 RX ORDER — DIPHENHYDRAMINE HCL 50 MG
50 CAPSULE ORAL ONCE
Refills: 0 | Status: COMPLETED | OUTPATIENT
Start: 2023-12-25 | End: 2024-11-22

## 2023-12-25 RX ORDER — DIPHENHYDRAMINE HCL 50 MG
50 CAPSULE ORAL ONCE
Refills: 0 | Status: COMPLETED | OUTPATIENT
Start: 2023-12-25 | End: 2023-12-25

## 2023-12-25 RX ADMIN — LEVETIRACETAM 500 MILLIGRAM(S): 250 TABLET, FILM COATED ORAL at 17:06

## 2023-12-25 RX ADMIN — FAMOTIDINE 20 MILLIGRAM(S): 10 INJECTION INTRAVENOUS at 13:55

## 2023-12-25 RX ADMIN — Medication 2: at 13:30

## 2023-12-25 RX ADMIN — Medication 8 MILLIGRAM(S): at 05:43

## 2023-12-25 RX ADMIN — APIXABAN 5 MILLIGRAM(S): 2.5 TABLET, FILM COATED ORAL at 05:43

## 2023-12-25 RX ADMIN — CLOPIDOGREL BISULFATE 75 MILLIGRAM(S): 75 TABLET, FILM COATED ORAL at 13:56

## 2023-12-25 RX ADMIN — Medication 3 MILLILITER(S): at 14:54

## 2023-12-25 RX ADMIN — Medication 5 UNIT(S): at 08:29

## 2023-12-25 RX ADMIN — GABAPENTIN 100 MILLIGRAM(S): 400 CAPSULE ORAL at 05:43

## 2023-12-25 RX ADMIN — ATORVASTATIN CALCIUM 20 MILLIGRAM(S): 80 TABLET, FILM COATED ORAL at 21:17

## 2023-12-25 RX ADMIN — GABAPENTIN 100 MILLIGRAM(S): 400 CAPSULE ORAL at 21:17

## 2023-12-25 RX ADMIN — Medication 3 MILLILITER(S): at 09:10

## 2023-12-25 RX ADMIN — ERTAPENEM SODIUM 120 MILLIGRAM(S): 1 INJECTION, POWDER, LYOPHILIZED, FOR SOLUTION INTRAMUSCULAR; INTRAVENOUS at 09:55

## 2023-12-25 RX ADMIN — Medication 5 UNIT(S): at 13:50

## 2023-12-25 RX ADMIN — Medication 40 MILLIGRAM(S): at 17:06

## 2023-12-25 RX ADMIN — Medication 50 MILLIGRAM(S): at 08:25

## 2023-12-25 RX ADMIN — MEXILETINE HYDROCHLORIDE 200 MILLIGRAM(S): 150 CAPSULE ORAL at 05:44

## 2023-12-25 RX ADMIN — Medication 1200 MILLIGRAM(S): at 05:43

## 2023-12-25 RX ADMIN — Medication 3 MILLILITER(S): at 20:32

## 2023-12-25 RX ADMIN — MONTELUKAST 10 MILLIGRAM(S): 4 TABLET, CHEWABLE ORAL at 17:07

## 2023-12-25 RX ADMIN — Medication 40 MILLIGRAM(S): at 21:17

## 2023-12-25 RX ADMIN — GABAPENTIN 100 MILLIGRAM(S): 400 CAPSULE ORAL at 13:55

## 2023-12-25 RX ADMIN — MEXILETINE HYDROCHLORIDE 200 MILLIGRAM(S): 150 CAPSULE ORAL at 21:33

## 2023-12-25 RX ADMIN — INSULIN GLARGINE 16 UNIT(S): 100 INJECTION, SOLUTION SUBCUTANEOUS at 21:32

## 2023-12-25 RX ADMIN — Medication 50 MILLIGRAM(S): at 21:17

## 2023-12-25 RX ADMIN — APIXABAN 5 MILLIGRAM(S): 2.5 TABLET, FILM COATED ORAL at 17:06

## 2023-12-25 RX ADMIN — Medication 1200 MILLIGRAM(S): at 17:06

## 2023-12-25 RX ADMIN — TIOTROPIUM BROMIDE 2 PUFF(S): 18 CAPSULE ORAL; RESPIRATORY (INHALATION) at 09:10

## 2023-12-25 RX ADMIN — Medication 5 UNIT(S): at 18:19

## 2023-12-25 RX ADMIN — Medication 1: at 18:19

## 2023-12-25 RX ADMIN — LEVETIRACETAM 500 MILLIGRAM(S): 250 TABLET, FILM COATED ORAL at 05:43

## 2023-12-25 RX ADMIN — MEXILETINE HYDROCHLORIDE 200 MILLIGRAM(S): 150 CAPSULE ORAL at 13:55

## 2023-12-25 RX ADMIN — Medication 2: at 08:29

## 2023-12-25 NOTE — PROGRESS NOTE ADULT - ASSESSMENT
73y  Female with h/o A-fib, COPD, TAVR, tracheomalacia, aortic dissection, ostomy secondary to colorectal cancer, adrenal insufficiency, on steroids. Patient presents to the ED complaining of nausea, vomiting and SOB. Patient reports not feeling well, unable to elaborate on her symptoms. In the ER patient is febrile to 101.9F, leukocytosis. Started on Ertapenem.    ESBL Proteus bacteremia/sepsis  Aspiration PNA  Fever  Leukocytosis   Multiple antibiotic allergies   Left foot pain       - Blood cultures 12/21 reporting ESBL Proteus   - Repeat blood cultures pending  - Sputum Cx from Oct 28, 2023 reporting ESBL Proteus   - RVP/COVID 19 PCR 12/21 negative   - CT Chest reporting multifocal PNA   - CT A/P without contrast reporting large stool in colon, no other acute findings   - Check CT A/P with contrast   - Procalcitonin level 0.52  - Continue ertapenem with benadryl   - Patient takes benadryl daily when on antibiotics to prevent allergy, will continue this way for now. Needs to see allergist as outpatient.   - swallow eval noted   - Suspicion for source is likely aspiration PNA, will check CT A/P with contrast to r/o intra-Abd source.   - Follow up cultures  - Trend Fever  - Trend WBC      Will follow      Discussed treatment plan with: Dr Barclay

## 2023-12-25 NOTE — PROGRESS NOTE ADULT - ASSESSMENT
73-year-old female patient with a history of A-fib, COPD, TAVR,  tracheomalacia, aortic dissection, ostomy secondary to colorectal cancer, adrenal insufficiency, on steroids, presents to the ED complaining of nausea, vomiting and SOB. Pt is a poor historian and not able to provide a full history. Only reports not feeling well and worsening SOB with productive cough. Per daughter over the phone, was not feeling well, was confused, with decrease appetite, not drinking, had an episode of vomiting. In Ed, was febrile with elevated WBC, found to be septic.     #Sepsis secondary to multifocal PNA and likely COPD exacerbation   - Febrile, elevated WBC   - Pt with extensive allergies to antibiotics   - c/w Ertapenem + diphenhydramine 50mg IV daily, per ID will stop 12/25  - c/w tiotropium 2.5 micrograms 2 puff daily  - c/w albuterol/ipratropium 3 mL q6   - c/w Mucinex 1200 oral q12  - c/w methylprednisolone 8 mg daily  - c/w montelukast 10 mg dialy  - c/w Zofran 4mg q6  - sent repeat BCx  - f/u repeat BCx  - ID recs appreciated    #Left foot pain  - f/u X Ray L Foot  - appreciate podiatry recs    #Microcytic Anemia  - H/H noted to be 8.8/32.0  - MCV at 66.3   - ordered iron studies to be drawn in AM   - f/u iron studies  - c/w trend h/h     #Chronic afib   - c/w Eliquis 5mg q12  - c/w Mexiletine 200 mg q8     #Type 2 DM with neuropathy  - a1c ~9  - c/w Lantus 16 units and Admelog 5 TID   - c/w Gabapentin 100 mg q8     #CAD   - c/w Plavix 75mg daily  - c/w atorvastatin 20 mg at bedtime    #Seizures   - c/w levetiracetam 500mg BID    S/p Colostomy   - c/w Senna daily     Dispo: IV Abx through 12/25 73-year-old female patient with a history of A-fib, COPD, TAVR,  tracheomalacia, aortic dissection, ostomy secondary to colorectal cancer, adrenal insufficiency, on steroids, presents to the ED complaining of nausea, vomiting and SOB. Pt is a poor historian and not able to provide a full history. Only reports not feeling well and worsening SOB with productive cough. Per daughter over the phone, was not feeling well, was confused, with decrease appetite, not drinking, had an episode of vomiting. In Ed, was febrile with elevated WBC, found to be septic.     #Sepsis secondary to multifocal PNA and likely COPD exacerbation   - Febrile, elevated WBC   - Pt with extensive allergies to antibiotics   - c/w Ertapenem + diphenhydramine 50mg IV daily  - c/w tiotropium 2.5 micrograms 2 puff daily  - c/w albuterol/ipratropium 3 mL q6   - c/w Mucinex 1200 oral q12  - c/w methylprednisolone 8 mg daily  - c/w montelukast 10 mg dialy  - c/w Zofran 4mg q6  - sent repeat BCx  - f/u repeat BCx  - ID recs appreciated    #Left foot pain  - c/w elevation of L foot  - f/u X Ray L Foot official read  - appreciate podiatry recs    #Microcytic Anemia  - H/H noted to be 8.8/32.0  - MCV at 66.3   - iron studies noted   - c/w trend h/h     #Chronic afib   - c/w Eliquis 5mg q12  - c/w Mexiletine 200 mg q8     #Type 2 DM with neuropathy  - a1c ~9  - c/w Lantus 16 units and Admelog 5 TID   - c/w Gabapentin 100 mg q8     #CAD   - c/w Plavix 75mg daily  - c/w atorvastatin 20 mg at bedtime    #Seizures   - c/w levetiracetam 500mg BID    S/p Colostomy   - c/w Senna daily     Dispo: Active 73-year-old female patient with a history of A-fib, COPD, TAVR,  tracheomalacia, aortic dissection, ostomy secondary to colorectal cancer, adrenal insufficiency, on steroids, presents to the ED complaining of nausea, vomiting and SOB. Pt is a poor historian and not able to provide a full history. Only reports not feeling well and worsening SOB with productive cough. Per daughter over the phone, was not feeling well, was confused, with decrease appetite, not drinking, had an episode of vomiting. In Ed, was febrile with elevated WBC, found to be septic.     #Sepsis secondary to multifocal PNA and ESBL proteus bacteremia  sepsis resolved.    - c/w Ertapenem + diphenhydramine 50mg IV daily  although pneumonia is being considered the source of bacteremia, ID wanted to r/o other sources of bacteremia and CT abdomen and pelvis was recommended. d/w Dr. Benjamin.    #. COPD   exacerbation resolved.  - c/w tiotropium 2.5 micrograms 2 puff daily  - c/w albuterol/ipratropium 3 mL q6   - c/w Mucinex 1200 oral q12  - will dc methylprednisolone 8 mg daily  - c/w montelukast 10 mg dialy  - c/w Zofran 4mg q6  - sent repeat BCx  - f/u repeat BCx  - ID recs appreciated    #Left foot pain  this has almost resolved  - c/w elevation of L foot  - f/u X [facility] L Foot official read  - appreciate podiatry recs    #Microcytic Anemia  - H/H noted to be 8.8/32.0  - MCV at 66.3   - iron studies normal. this could be due to ?thalassemia    - c/w trend h/h     #Chronic afib   - c/w Eliquis 5mg q12  - c/w Mexiletine 200 mg q8     #Type 2 DM with neuropathy  - a1c ~9. blood glucose controlled due to steroids. since steroids will be stopped hoping for blood glucose to improve.  - c/w Lantus 16 units and Admelog 5 TID   - c/w Gabapentin 100 mg q8     #CAD   - c/w Plavix 75mg daily  - c/w atorvastatin 20 mg at bedtime    #Seizures   - c/w levetiracetam 500mg BID    S/p Colostomy   - c/w Senna daily     Dispo: Active

## 2023-12-25 NOTE — PROGRESS NOTE ADULT - SUBJECTIVE AND OBJECTIVE BOX
INCOMPLETE  73-year-old female patient with a history of A-fib, COPD, TAVR,  tracheomalacia, aortic dissection, ostomy secondary to colorectal cancer, adrenal insufficiency, on steroids, presents to the ED complaining of nausea, vomiting and SOB.    INTERVAL HPI/OVERNIGHT EVENTS:  -    SUBJECTIVE: Patient seen and examined at bedside.     ROS: All negative except as listed above.    VITAL SIGNS:  ICU Vital Signs Last 24 Hrs  T(C): 37 (25 Dec 2023 04:54), Max: 37.2 (24 Dec 2023 08:49)  T(F): 98.6 (25 Dec 2023 04:54), Max: 99 (24 Dec 2023 08:49)  HR: 75 (25 Dec 2023 04:54) (72 - 79)  BP: 131/72 (25 Dec 2023 04:54) (128/73 - 148/73)  RR: 18 (25 Dec 2023 04:54) (18 - 18)  SpO2: 94% (25 Dec 2023 04:54) (94% - 100%)    O2 Parameters below as of 25 Dec 2023 04:54  Patient On (Oxygen Delivery Method): nasal cannula  O2 Flow (L/min): 2    Plateau pressure:   P/F ratio:     12-24 @ 07:01  -  12-25 @ 07:00  --------------------------------------------------------  IN: 0 mL / OUT: 1150 mL / NET: -1150 mL    CAPILLARY BLOOD GLUCOSE  POCT Blood Glucose: 208 mg/dL (24 Dec 2023 21:41)    ECG: reviewed.    PHYSICAL EXAM:    GENERAL: NAD  HEAD: Swollen Eyelids, no JVD  EYES: EOMI, PERRLA, conjunctiva and sclera clear  NECK: Supple, trachea midline, no JVD  HEART: Irregular rate and rhythm, no murmurs,   LUNGS: CTAB  ABDOMEN: Soft, nontender, nondistended, colostomy in place  EXTREMITIES: No edema  NERVOUS SYSTEM:  A&Ox2, no focal deficits     MEDICATIONS:  MEDICATIONS  (STANDING):  albuterol/ipratropium for Nebulization 3 milliLiter(s) Nebulizer every 6 hours  apixaban 5 milliGRAM(s) Oral every 12 hours  atorvastatin 20 milliGRAM(s) Oral at bedtime  clopidogrel Tablet 75 milliGRAM(s) Oral daily  dextrose 5%. 1000 milliLiter(s) (50 mL/Hr) IV Continuous <Continuous>  dextrose 5%. 1000 milliLiter(s) (100 mL/Hr) IV Continuous <Continuous>  dextrose 50% Injectable 25 Gram(s) IV Push once  dextrose 50% Injectable 25 Gram(s) IV Push once  dextrose 50% Injectable 12.5 Gram(s) IV Push once  diphenhydrAMINE Injectable 50 milliGRAM(s) IV Push daily  ertapenem  IVPB 1000 milliGRAM(s) IV Intermittent every 24 hours  famotidine    Tablet 20 milliGRAM(s) Oral daily  gabapentin 100 milliGRAM(s) Oral every 8 hours  glucagon  Injectable 1 milliGRAM(s) IntraMuscular once  guaiFENesin ER 1200 milliGRAM(s) Oral every 12 hours  influenza  Vaccine (HIGH DOSE) 0.7 milliLiter(s) IntraMuscular once  insulin glargine Injectable (LANTUS) 16 Unit(s) SubCutaneous at bedtime  insulin lispro (ADMELOG) corrective regimen sliding scale   SubCutaneous three times a day before meals  insulin lispro Injectable (ADMELOG) 5 Unit(s) SubCutaneous three times a day before meals  levETIRAcetam 500 milliGRAM(s) Oral two times a day  methylPREDNISolone 8 milliGRAM(s) Oral daily  mexiletine 200 milliGRAM(s) Oral every 8 hours  montelukast 10 milliGRAM(s) Oral daily  tiotropium 2.5 MICROgram(s) Inhaler 2 Puff(s) Inhalation daily    MEDICATIONS  (PRN):  dextrose Oral Gel 15 Gram(s) Oral once PRN Blood Glucose LESS THAN 70 milliGRAM(s)/deciliter  lactulose Syrup 10 Gram(s) Oral daily PRN constipation  ondansetron    Tablet 4 milliGRAM(s) Oral every 6 hours PRN for nausea    ALLERGIES:  Allergies    aspirin (Short breath)  Dilaudid (Short breath)  tetanus toxoid (Short breath)  Avelox (Short breath; Pruritus)  Valium (Short breath)  codeine (Short breath)  shellfish (Anaphylaxis)  cefepime (Anaphylaxis)  penicillin (Anaphylaxis)  iodine (Short breath; Swelling)    Intolerances      LABS:                        8.8    10.10 )-----------( 321      ( 24 Dec 2023 07:57 )             32.0     12-24    142  |  104  |  7.7<L>  ----------------------------<  145<H>  4.0   |  29.0  |  0.55    Ca    8.2<L>      24 Dec 2023 07:57  Phos  2.3     12-24  Mg     1.8     12-24      Urinalysis Basic - ( 24 Dec 2023 07:57 )    Color: x / Appearance: x / SG: x / pH: x  Gluc: 145 mg/dL / Ketone: x  / Bili: x / Urobili: x   Blood: x / Protein: x / Nitrite: x   Leuk Esterase: x / RBC: x / WBC x   Sq Epi: x / Non Sq Epi: x / Bacteria: x    ABG:    vBG:    Micro:    Culture - Blood (collected 12-21-23 @ 12:00)  Source: .Blood Blood-Peripheral  Gram Stain (12-24-23 @ 06:42):    Growth in anaerobic bottle: Gram Negative Rods  Preliminary Report (12-24-23 @ 06:42):    Growth in anaerobic bottle: Gram Negative Rods    Direct identification is available within approximately 3-5    hours either by Blood Panel Multiplexed PCR or Direct    MALDI-TOF. Details: [email or web address]  Organism: Blood Culture PCR (12-24-23 @ 07:37)  Organism: Blood Culture PCR (12-24-23 @ 07:37)      Method Type: PCR      -  Proteus species: Detec      -  ESBL: Detec      -  CTX-M Resistance Marker: Detec    Culture - Blood (collected 12-21-23 @ 11:50)  Source: .Blood Blood-Peripheral  Preliminary Report (12-24-23 @ 16:01):    No growth at 72 Hours    RADIOLOGY & ADDITIONAL TESTS: Reviewed. INCOMPLETE  73-year-old female patient with a history of A-fib, COPD, TAVR,  tracheomalacia, aortic dissection, ostomy secondary to colorectal cancer, adrenal insufficiency, on steroids, presents to the ED complaining of nausea, vomiting and SOB.    INTERVAL HPI/OVERNIGHT EVENTS:  -    SUBJECTIVE: Patient seen and examined at bedside.     ROS: All negative except as listed above.    VITAL SIGNS:  ICU Vital Signs Last 24 Hrs  T(C): 37 (25 Dec 2023 04:54), Max: 37.2 (24 Dec 2023 08:49)  T(F): 98.6 (25 Dec 2023 04:54), Max: 99 (24 Dec 2023 08:49)  HR: 75 (25 Dec 2023 04:54) (72 - 79)  BP: 131/72 (25 Dec 2023 04:54) (128/73 - 148/73)  RR: 18 (25 Dec 2023 04:54) (18 - 18)  SpO2: 94% (25 Dec 2023 04:54) (94% - 100%)    O2 Parameters below as of 25 Dec 2023 04:54  Patient On (Oxygen Delivery Method): nasal cannula  O2 Flow (L/min): 2    Plateau pressure:   P/F ratio:     12-24 @ 07:01  -  12-25 @ 07:00  --------------------------------------------------------  IN: 0 mL / OUT: 1150 mL / NET: -1150 mL    CAPILLARY BLOOD GLUCOSE  POCT Blood Glucose: 208 mg/dL (24 Dec 2023 21:41)    ECG: reviewed.    PHYSICAL EXAM:    GENERAL: NAD  HEAD: Swollen Eyelids, no JVD  EYES: EOMI, PERRLA, conjunctiva and sclera clear  NECK: Supple, trachea midline, no JVD  HEART: Irregular rate and rhythm, no murmurs,   LUNGS: CTAB  ABDOMEN: Soft, nontender, nondistended, colostomy in place  EXTREMITIES: No edema  NERVOUS SYSTEM:  A&Ox2, no focal deficits     MEDICATIONS:  MEDICATIONS  (STANDING):  albuterol/ipratropium for Nebulization 3 milliLiter(s) Nebulizer every 6 hours  apixaban 5 milliGRAM(s) Oral every 12 hours  atorvastatin 20 milliGRAM(s) Oral at bedtime  clopidogrel Tablet 75 milliGRAM(s) Oral daily  dextrose 5%. 1000 milliLiter(s) (50 mL/Hr) IV Continuous <Continuous>  dextrose 5%. 1000 milliLiter(s) (100 mL/Hr) IV Continuous <Continuous>  dextrose 50% Injectable 25 Gram(s) IV Push once  dextrose 50% Injectable 25 Gram(s) IV Push once  dextrose 50% Injectable 12.5 Gram(s) IV Push once  diphenhydrAMINE Injectable 50 milliGRAM(s) IV Push daily  ertapenem  IVPB 1000 milliGRAM(s) IV Intermittent every 24 hours  famotidine    Tablet 20 milliGRAM(s) Oral daily  gabapentin 100 milliGRAM(s) Oral every 8 hours  glucagon  Injectable 1 milliGRAM(s) IntraMuscular once  guaiFENesin ER 1200 milliGRAM(s) Oral every 12 hours  influenza  Vaccine (HIGH DOSE) 0.7 milliLiter(s) IntraMuscular once  insulin glargine Injectable (LANTUS) 16 Unit(s) SubCutaneous at bedtime  insulin lispro (ADMELOG) corrective regimen sliding scale   SubCutaneous three times a day before meals  insulin lispro Injectable (ADMELOG) 5 Unit(s) SubCutaneous three times a day before meals  levETIRAcetam 500 milliGRAM(s) Oral two times a day  methylPREDNISolone 8 milliGRAM(s) Oral daily  mexiletine 200 milliGRAM(s) Oral every 8 hours  montelukast 10 milliGRAM(s) Oral daily  tiotropium 2.5 MICROgram(s) Inhaler 2 Puff(s) Inhalation daily    MEDICATIONS  (PRN):  dextrose Oral Gel 15 Gram(s) Oral once PRN Blood Glucose LESS THAN 70 milliGRAM(s)/deciliter  lactulose Syrup 10 Gram(s) Oral daily PRN constipation  ondansetron    Tablet 4 milliGRAM(s) Oral every 6 hours PRN for nausea    ALLERGIES:  Allergies    aspirin (Short breath)  Dilaudid (Short breath)  tetanus toxoid (Short breath)  Avelox (Short breath; Pruritus)  Valium (Short breath)  codeine (Short breath)  shellfish (Anaphylaxis)  cefepime (Anaphylaxis)  penicillin (Anaphylaxis)  iodine (Short breath; Swelling)    Intolerances      LABS:                        8.8    10.10 )-----------( 321      ( 24 Dec 2023 07:57 )             32.0     12-24    142  |  104  |  7.7<L>  ----------------------------<  145<H>  4.0   |  29.0  |  0.55    Ca    8.2<L>      24 Dec 2023 07:57  Phos  2.3     12-24  Mg     1.8     12-24      Urinalysis Basic - ( 24 Dec 2023 07:57 )    Color: x / Appearance: x / SG: x / pH: x  Gluc: 145 mg/dL / Ketone: x  / Bili: x / Urobili: x   Blood: x / Protein: x / Nitrite: x   Leuk Esterase: x / RBC: x / WBC x   Sq Epi: x / Non Sq Epi: x / Bacteria: x    ABG:    vBG:    Micro:    Culture - Blood (collected 12-21-23 @ 12:00)  Source: .Blood Blood-Peripheral  Gram Stain (12-24-23 @ 06:42):    Growth in anaerobic bottle: Gram Negative Rods  Preliminary Report (12-24-23 @ 06:42):    Growth in anaerobic bottle: Gram Negative Rods    Direct identification is available within approximately 3-5    hours either by Blood Panel Multiplexed PCR or Direct    MALDI-TOF. Details: [email or web address]  Organism: Blood Culture PCR (12-24-23 @ 07:37)  Organism: Blood Culture PCR (12-24-23 @ 07:37)      Method Type: PCR      -  Proteus species: Detec      -  ESBL: Detec      -  CTX-M Resistance Marker: Detec    Culture - Blood (collected 12-21-23 @ 11:50)  Source: .Blood Blood-Peripheral  Preliminary Report (12-24-23 @ 16:01):    No growth at 72 Hours    RADIOLOGY & ADDITIONAL TESTS: Reviewed. 73-year-old female patient with a history of A-fib, COPD, TAVR,  tracheomalacia, aortic dissection, ostomy secondary to colorectal cancer, adrenal insufficiency, on steroids, presents to the ED complaining of nausea, vomiting and SOB.    INTERVAL HPI/OVERNIGHT EVENTS:  - No acute events overnight  - Slight confusion about how to give Abx with benadryl, Dose scheduled for 6 was missed.   - Dose provided at 8.       SUBJECTIVE:   - Patient seen and examined at bedside.   - Reports Left heel pain improved with elevation.   - No complaints    ROS: All negative except as listed above.    VITAL SIGNS:    T(C): 37 (25 Dec 2023 04:54), Max: 37.1 (24 Dec 2023 16:49)  T(F): 98.6 (25 Dec 2023 04:54), Max: 98.7 (24 Dec 2023 16:49)  HR: 76 (25 Dec 2023 09:11) (72 - 79)  BP: 131/72 (25 Dec 2023 04:54) (128/73 - 148/73)  RR: 18 (25 Dec 2023 04:54) (18 - 18)  SpO2: 96% (25 Dec 2023 09:11) (94% - 98%)    O2 Parameters below as of 25 Dec 2023 09:11  Patient On (Oxygen Delivery Method): room air    Plateau pressure:   P/F ratio:     12-24 @ 07:01  -  12-25 @ 07:00  --------------------------------------------------------  IN: 0 mL / OUT: 1150 mL / NET: -1150 mL    CAPILLARY BLOOD GLUCOSE  POCT Blood Glucose: 208 mg/dL (24 Dec 2023 21:41)    ECG: reviewed.    PHYSICAL EXAM:    GENERAL: NAD  HEAD: Swollen Eyelids, no JVD  EYES: EOMI, PERRLA, conjunctiva and sclera clear  NECK: Supple, trachea midline, no JVD  HEART: Irregular rate and rhythm, no murmurs,   LUNGS: CTAB  ABDOMEN: Soft, nontender, nondistended, colostomy in place  EXTREMITIES: No edema  NERVOUS SYSTEM:  A&Ox2, no focal deficits     MEDICATIONS:  MEDICATIONS  (STANDING):  albuterol/ipratropium for Nebulization 3 milliLiter(s) Nebulizer every 6 hours  apixaban 5 milliGRAM(s) Oral every 12 hours  atorvastatin 20 milliGRAM(s) Oral at bedtime  clopidogrel Tablet 75 milliGRAM(s) Oral daily  dextrose 5%. 1000 milliLiter(s) (50 mL/Hr) IV Continuous <Continuous>  dextrose 5%. 1000 milliLiter(s) (100 mL/Hr) IV Continuous <Continuous>  dextrose 50% Injectable 25 Gram(s) IV Push once  dextrose 50% Injectable 25 Gram(s) IV Push once  dextrose 50% Injectable 12.5 Gram(s) IV Push once  diphenhydrAMINE Injectable 50 milliGRAM(s) IV Push daily  ertapenem  IVPB 1000 milliGRAM(s) IV Intermittent every 24 hours  famotidine    Tablet 20 milliGRAM(s) Oral daily  gabapentin 100 milliGRAM(s) Oral every 8 hours  glucagon  Injectable 1 milliGRAM(s) IntraMuscular once  guaiFENesin ER 1200 milliGRAM(s) Oral every 12 hours  influenza  Vaccine (HIGH DOSE) 0.7 milliLiter(s) IntraMuscular once  insulin glargine Injectable (LANTUS) 16 Unit(s) SubCutaneous at bedtime  insulin lispro (ADMELOG) corrective regimen sliding scale   SubCutaneous three times a day before meals  insulin lispro Injectable (ADMELOG) 5 Unit(s) SubCutaneous three times a day before meals  levETIRAcetam 500 milliGRAM(s) Oral two times a day  methylPREDNISolone 8 milliGRAM(s) Oral daily  mexiletine 200 milliGRAM(s) Oral every 8 hours  montelukast 10 milliGRAM(s) Oral daily  tiotropium 2.5 MICROgram(s) Inhaler 2 Puff(s) Inhalation daily    MEDICATIONS  (PRN):  dextrose Oral Gel 15 Gram(s) Oral once PRN Blood Glucose LESS THAN 70 milliGRAM(s)/deciliter  lactulose Syrup 10 Gram(s) Oral daily PRN constipation  ondansetron    Tablet 4 milliGRAM(s) Oral every 6 hours PRN for nausea    ALLERGIES:  Allergies    aspirin (Short breath)  Dilaudid (Short breath)  tetanus toxoid (Short breath)  Avelox (Short breath; Pruritus)  Valium (Short breath)  codeine (Short breath)  shellfish (Anaphylaxis)  cefepime (Anaphylaxis)  penicillin (Anaphylaxis)  iodine (Short breath; Swelling)    Intolerances      LABS:                                   9.6    12.30 )-----------( 414      ( 25 Dec 2023 08:08 )             32.8     12-25    139  |  101  |  9.9  ----------------------------<  208<H>  4.6   |  27.0  |  0.52    Ca    8.5      25 Dec 2023 08:08  Phos  2.1     12-25  Mg     2.0     12-25    TPro  6.1<L>  /  Alb  3.0<L>  /  TBili  <0.2<L>  /  DBili  x   /  AST  18  /  ALT  10  /  AlkPhos  78  12-25    Urinalysis Basic - ( 25 Dec 2023 08:08 )    Color: x / Appearance: x / SG: x / pH: x  Gluc: 208 mg/dL / Ketone: x  / Bili: x / Urobili: x   Blood: x / Protein: x / Nitrite: x   Leuk Esterase: x / RBC: x / WBC x   Sq Epi: x / Non Sq Epi: x / Bacteria: x    Micro:    Culture - Blood (collected 12-21-23 @ 12:00)  Source: .Blood Blood-Peripheral  Gram Stain (12-24-23 @ 06:42):    Growth in anaerobic bottle: Gram Negative Rods  Preliminary Report (12-24-23 @ 06:42):    Growth in anaerobic bottle: Gram Negative Rods    Direct identification is available within approximately 3-5    hours either by Blood Panel Multiplexed PCR or Direct    MALDI-TOF. Details: [email or web address]  Organism: Blood Culture PCR (12-24-23 @ 07:37)  Organism: Blood Culture PCR (12-24-23 @ 07:37)      Method Type: PCR      -  Proteus species: Detec      -  ESBL: Detec      -  CTX-M Resistance Marker: Detec    Culture - Blood (collected 12-21-23 @ 11:50)  Source: .Blood Blood-Peripheral  Preliminary Report (12-24-23 @ 16:01):    No growth at 72 Hours    RADIOLOGY & ADDITIONAL TESTS: Reviewed. 73-year-old female patient with a history of A-fib, COPD, TAVR,  tracheomalacia, aortic dissection, ostomy secondary to colorectal cancer, adrenal insufficiency, on steroids, presents to the ED complaining of nausea, vomiting and SOB.    INTERVAL HPI/OVERNIGHT EVENTS:  - No acute events overnight  - Slight confusion about how to give Abx with benadryl, Dose scheduled for 6 was missed.   - Dose provided at 8.       SUBJECTIVE:   - Patient seen and examined at bedside.   - Reports Left heel pain improved with elevation.   - No complaints    ROS: All negative except as listed above.    VITAL SIGNS:    T(C): 37 (25 Dec 2023 04:54), Max: 37.1 (24 Dec 2023 16:49)  T(F): 98.6 (25 Dec 2023 04:54), Max: 98.7 (24 Dec 2023 16:49)  HR: 76 (25 Dec 2023 09:11) (72 - 79)  BP: 131/72 (25 Dec 2023 04:54) (128/73 - 148/73)  RR: 18 (25 Dec 2023 04:54) (18 - 18)  SpO2: 96% (25 Dec 2023 09:11) (94% - 98%)    O2 Parameters below as of 25 Dec 2023 09:11  Patient On (Oxygen Delivery Method): room air    Plateau pressure:   P/F ratio:     12-24 @ 07:01  -  12-25 @ 07:00  --------------------------------------------------------  IN: 0 mL / OUT: 1150 mL / NET: -1150 mL    CAPILLARY BLOOD GLUCOSE  POCT Blood Glucose: 208 mg/dL (24 Dec 2023 21:41)    ECG: reviewed.    PHYSICAL EXAM:    GENERAL: NAD  HEAD: Swollen Eyelids, no JVD  EYES: EOMI, PERRLA, conjunctiva and sclera clear  NECK: Supple, trachea midline, no JVD  HEART: Irregular rate and rhythm, no murmurs,   LUNGS: CTAB  ABDOMEN: Soft, nontender, nondistended, colostomy in place  EXTREMITIES: No edema  NERVOUS SYSTEM:  A&Ox2, no focal deficits     MEDICATIONS:  MEDICATIONS  (STANDING):  albuterol/ipratropium for Nebulization 3 milliLiter(s) Nebulizer every 6 hours  apixaban 5 milliGRAM(s) Oral every 12 hours  atorvastatin 20 milliGRAM(s) Oral at bedtime  clopidogrel Tablet 75 milliGRAM(s) Oral daily  dextrose 5%. 1000 milliLiter(s) (50 mL/Hr) IV Continuous <Continuous>  dextrose 5%. 1000 milliLiter(s) (100 mL/Hr) IV Continuous <Continuous>  dextrose 50% Injectable 25 Gram(s) IV Push once  dextrose 50% Injectable 25 Gram(s) IV Push once  dextrose 50% Injectable 12.5 Gram(s) IV Push once  diphenhydrAMINE Injectable 50 milliGRAM(s) IV Push daily  ertapenem  IVPB 1000 milliGRAM(s) IV Intermittent every 24 hours  famotidine    Tablet 20 milliGRAM(s) Oral daily  gabapentin 100 milliGRAM(s) Oral every 8 hours  glucagon  Injectable 1 milliGRAM(s) IntraMuscular once  guaiFENesin ER 1200 milliGRAM(s) Oral every 12 hours  influenza  Vaccine (HIGH DOSE) 0.7 milliLiter(s) IntraMuscular once  insulin glargine Injectable (LANTUS) 16 Unit(s) SubCutaneous at bedtime  insulin lispro (ADMELOG) corrective regimen sliding scale   SubCutaneous three times a day before meals  insulin lispro Injectable (ADMELOG) 5 Unit(s) SubCutaneous three times a day before meals  levETIRAcetam 500 milliGRAM(s) Oral two times a day  methylPREDNISolone 8 milliGRAM(s) Oral daily  mexiletine 200 milliGRAM(s) Oral every 8 hours  montelukast 10 milliGRAM(s) Oral daily  tiotropium 2.5 MICROgram(s) Inhaler 2 Puff(s) Inhalation daily    MEDICATIONS  (PRN):  dextrose Oral Gel 15 Gram(s) Oral once PRN Blood Glucose LESS THAN 70 milliGRAM(s)/deciliter  lactulose Syrup 10 Gram(s) Oral daily PRN constipation  ondansetron    Tablet 4 milliGRAM(s) Oral every 6 hours PRN for nausea    ALLERGIES:  Allergies    aspirin (Short breath)  Dilaudid (Short breath)  tetanus toxoid (Short breath)  Avelox (Short breath; Pruritus)  Valium (Short breath)  codeine (Short breath)  shellfish (Anaphylaxis)  cefepime (Anaphylaxis)  penicillin (Anaphylaxis)  iodine (Short breath; Swelling)    Intolerances      LABS:                                   9.6    12.30 )-----------( 414      ( 25 Dec 2023 08:08 )             32.8     12-25    139  |  101  |  9.9  ----------------------------<  208<H>  4.6   |  27.0  |  0.52    Ca    8.5      25 Dec 2023 08:08  Phos  2.1     12-25  Mg     2.0     12-25    TPro  6.1<L>  /  Alb  3.0<L>  /  TBili  <0.2<L>  /  DBili  x   /  AST  18  /  ALT  10  /  AlkPhos  78  12-25    Urinalysis Basic - ( 25 Dec 2023 08:08 )    Color: x / Appearance: x / SG: x / pH: x  Gluc: 208 mg/dL / Ketone: x  / Bili: x / Urobili: x   Blood: x / Protein: x / Nitrite: x   Leuk Esterase: x / RBC: x / WBC x   Sq Epi: x / Non Sq Epi: x / Bacteria: x    Micro:    Culture - Blood (collected 12-21-23 @ 12:00)  Source: .Blood Blood-Peripheral  Gram Stain (12-24-23 @ 06:42):    Growth in anaerobic bottle: Gram Negative Rods  Preliminary Report (12-24-23 @ 06:42):    Growth in anaerobic bottle: Gram Negative Rods    Direct identification is available within approximately 3-5    hours either by Blood Panel Multiplexed PCR or Direct    MALDI-TOF. Details: [email or web address]  Organism: Blood Culture PCR (12-24-23 @ 07:37)  Organism: Blood Culture PCR (12-24-23 @ 07:37)      Method Type: PCR      -  Proteus species: Detec      -  ESBL: Detec      -  CTX-M Resistance Marker: Detec    Culture - Blood (collected 12-21-23 @ 11:50)  Source: .Blood Blood-Peripheral  Preliminary Report (12-24-23 @ 16:01):    No growth at 72 Hours    RADIOLOGY & ADDITIONAL TESTS: Reviewed.

## 2023-12-25 NOTE — PROGRESS NOTE ADULT - SUBJECTIVE AND OBJECTIVE BOX
[facility]  INFECTIOUS DISEASES at [place] / [place] / [place]  =======================================================                               Allen Ibanez MD#   Iam Benjamin MD*                             Opal Da Silva MD*   Michelle Dowell MD*                              Professor Emeritus:  Dr Choco Hewitt MD^            Diplomates American Board of Internal Medicine & Infectious Diseases                # [place] Office - Appt - Tel  [phone] Fax [phone]                * [place] Office - Appt - Tel [phone] Fax [phone]                      ^[place] Office - Tel  [phone] Fax [phone]                                  Hospital Consult line:  [phone]  =======================================================    RAYRAY RODRIGUEZ [id number]    Follow up: aspiration PNA  Recalled for Gram negative rods in blood culture    No fevers since 12/21    c/o left foot heel pain       Allergies:  aspirin (Short breath)  Dilaudid (Short breath)  tetanus toxoid (Short breath)  Avelox (Short breath; Pruritus)  Valium (Short breath)  codeine (Short breath)  shellfish (Anaphylaxis)  cefepime (Anaphylaxis)  penicillin (Anaphylaxis)  iodine (Short breath; Swelling)      REVIEW OF SYSTEMS:  no complaints     Physical Exam:  GEN: NAD  HEENT: normocephalic and atraumatic.   NECK: Supple.   LUNGS: CTA B/L   HEART: RRR  ABDOMEN: Soft, NT, ND.  +BS.    : No CVA tenderness  EXTREMITIES: Without  edema.  MSK: No joint swelling  NEUROLOGIC: More awake and answering questions  SKIN: Left foot heel improved         Vitals:  T(F): 99 (24 Dec 2023 08:49), Max: 99.1 (24 Dec 2023 05:30)  HR: 76 (24 Dec 2023 08:49)  BP: 138/74 (24 Dec 2023 08:49)  RR: 18 (24 Dec 2023 08:49)  SpO2: 100% (24 Dec 2023 08:28) (94% - 100%)  temp max in last 48H T(F): , Max: 99.1 (12-24-23 @ 05:30)    Current Antibiotics:  ertapenem  IVPB 1000 milliGRAM(s) IV Intermittent every 24 hours    Other medications:  albuterol/ipratropium for Nebulization 3 milliLiter(s) Nebulizer every 6 hours  apixaban 5 milliGRAM(s) Oral every 12 hours  atorvastatin 20 milliGRAM(s) Oral at bedtime  clopidogrel Tablet 75 milliGRAM(s) Oral daily  dextrose 5%. 1000 milliLiter(s) IV Continuous <Continuous>  dextrose 5%. 1000 milliLiter(s) IV Continuous <Continuous>  dextrose 50% Injectable 25 Gram(s) IV Push once  dextrose 50% Injectable 25 Gram(s) IV Push once  dextrose 50% Injectable 12.5 Gram(s) IV Push once  diphenhydrAMINE Injectable 50 milliGRAM(s) IV Push daily  famotidine    Tablet 20 milliGRAM(s) Oral daily  gabapentin 100 milliGRAM(s) Oral every 8 hours  glucagon  Injectable 1 milliGRAM(s) IntraMuscular once  guaiFENesin ER 1200 milliGRAM(s) Oral every 12 hours  influenza  Vaccine (HIGH DOSE) 0.7 milliLiter(s) IntraMuscular once  insulin glargine Injectable (LANTUS) 16 Unit(s) SubCutaneous at bedtime  insulin lispro (ADMELOG) corrective regimen sliding scale   SubCutaneous three times a day before meals  insulin lispro Injectable (ADMELOG) 5 Unit(s) SubCutaneous three times a day before meals  levETIRAcetam 500 milliGRAM(s) Oral two times a day  methylPREDNISolone 8 milliGRAM(s) Oral daily  mexiletine 200 milliGRAM(s) Oral every 8 hours  montelukast 10 milliGRAM(s) Oral daily  tiotropium 2.5 MICROgram(s) Inhaler 2 Puff(s) Inhalation daily                            8.8    10.10 )-----------( 321      ( 24 Dec 2023 07:57 )             32.0     12-23    143  |  104  |  8.2  ----------------------------<  107<H>  4.0   |  29.0  |  0.54    Ca    8.4      23 Dec 2023 04:44  Phos  2.3     12-23  Mg     1.9     12-23    TPro  5.5<L>  /  Alb  2.7<L>  /  TBili  <0.2<L>  /  DBili  x   /  AST  16  /  ALT  9   /  AlkPhos  78  12-23    RECENT CULTURES:  12-21 @ 17:07    RVP  NotDetec    12-21 @ 12:00 .Blood Blood-Peripheral Blood Culture PCR    Growth in anaerobic bottle: Gram Negative Rods  Direct identification is available within approximately 3-5  hours either by Blood Panel Multiplexed PCR or Direct  MALDI-TOF. Details: [email or web address]  Growth in anaerobic bottle: Gram Negative Rods    12-21 @ 11:50 .Blood Blood-Peripheral     No growth at 48 Hours      WBC Count: 10.10 K/uL (12-24-23 @ 07:57)  WBC Count: 10.08 K/uL (12-23-23 @ 04:44)  WBC Count: 24.77 K/uL (12-21-23 @ 12:00)    Creatinine: 0.54 mg/dL (12-23-23 @ 04:44)  Creatinine: 0.94 mg/dL (12-21-23 @ 12:00)    Procalcitonin, Serum: 0.52 ng/mL (12-23-23 @ 04:44)     SARS-CoV-2: NotDete (12-21-23 @ 17:07)  SARS-CoV-2 Result: NotDete (12-21-23 @ 12:00)          < from: CT Chest No Cont (12.21.23 @ 14:35) >  ACC: [id number] EXAM:  CT CHEST   ORDERED BY: WILLIAM WIEDEMANN     ACC: [id number] EXAM:  CT ABDOMEN AND PELVIS   ORDERED BY: MICHAEL COX     PROCEDURE DATE:  12/21/2023      INTERPRETATION:  CT CHEST, ABDOMEN AND PELVIS WITHOUT CONTRAST    Clinical Indication: Pneumonia.  Vomiting and fevers with abd pain.   Patient has colostomy bag with no output for 24 hours, evaluate for   obstruction.  History of anal squamous cell carcinoma.    Technique: Axial CT images of the chest, abdomen and pelvis were obtained   from the lung apices to the pubic symphysis without oral or IV contrast.    Coronal and sagittal reformatted images were created and reviewed.    Comparison:  Prior CT of the chest from 10/28/2023, CT of the chest from   9/28/2023,CT abdomen and pelvis from 8/23/2023.  CT of the chest,   abdomen and pelvis from 9/1/2023, 8/1/2023.  CT of the abdomen and pelvis   from 9/18/2017.  CT of the chest from 9/22/2016.    Findings:  CHEST:    LUNGS AND LARGE AIRWAYS:   The tracheobronchial tree is patent. Bilateral   patchy airspace opacities are noted, right lung worse than left, and most   prominently seen in the right middle lobe; findings are significantly   worsened compared to the prior study from 10/28/2023. Trace secretions  are seen posteriorly in the trachea. Mild right apical paraseptal   emphysematous changes.    PLEURA: No pleural effusion. No pneumothorax.    VESSELS: Status post ascending aortic repair and aortic valve replacement   with redemonstrated valve-in-valve TAVR. Known residual dissection   involving the thoracoabdominal aorta is better seen on recent   contrast-enhanced CT from August and September, 2023. The pulmonary   artery is mildly enlarged, correlate for pulmonary artery hypertension.    HEART: Heart size is normal. No pericardial effusion.  Coronary artery   calcifications.    MEDIASTINUM AND MARANDA: There is no mediastinal lymphadenopathy.    Evaluation for hilar lymphadenopathy is limited without IV contrast,   however there is no gross hilar lymphadenopathy.    CHEST WALL AND LOWER NECK: There is no axillary lymphadenopathy.    ABDOMEN/PELVIS:  LIVER: Unenhanced liver appears within normal limits.  BILE DUCTS: Normal caliber.  GALLBLADDER: Within normal limits.  SPLEEN: Unenhancedspleen appears within normal limits.  PANCREAS: Unenhanced pancreas again appears atrophic.  Remonstrated   pancreatic hypodensities, measuring up to 2.4 cm at the pancreatic tail,   stable from multiple prior studies.  ADRENALS: Unenhanced adrenals appear within normal limits.  KIDNEYS/URETERS: No hydroureteronephrosis.  Known, partially calcified   left renal cysts appear grossly patent.    BLADDER: Appears grossly within normal limits, partially obscured by   metallic streak artifact from orthopedic hardware in the pubic symphysis    REPRODUCTIVE ORGANS: Hysterectomy.    BOWEL:  Status post abdominoperineal resection with left-sided colostomy.   Moderate to large amount of stool throughout the colon extending to the   ostomy, correlate forconstipation. No evidence for bowel obstruction or   inflammation.    Appendix is normal.    PERITONEUM: No ascites. Postsurgical presacral changes suboptimally   assessed however without significant change from prior studies.  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  Scattered   atherosclerotic calcifications of the aortoiliac tree and proximal thigh   vasculature.    LYMPH NODES: No lymphadenopathy.  ABDOMINAL WALL: Postsurgical changes.  BONES: No suspicious osseous lesion. Several densely sclerotic foci   throughout the vertebral bodies and ribs are stable from multiple prior   studiesand probably represent bone islands. Median sternotomy wires are   midline appear intact. Status post plate-screw fixation of pubic   symphysis fracture.  Redemonstrated horizontally oriented orthopedic   screw traversing the right ilium and sacroiliac joint  Mild lumbar   scoliosis, convex to the left with the apex at L4-L5.  Mild spinal canal   stenosis at L3-L4 and L4-L5 with posterior disc bulges.   Avascular   necrosis in both femoral heads again seen.    OTHER:  None    IMPRESSION:  Multifocal pneumonia, as detailed above; findings are progressed since   the prior CT of the chest from 10/28/2023. Recommend clinical correlation   and follow-up imaging to document resolution.    Status post abdominoperineal resection with left-sided colostomy.   Moderate to large amount of stool throughout the colon extending to the   ostomy, correlate for constipation. No evidence for bowel obstruction or   inflammation.    Appendix is normal.    Status post ascending aortic repair and aortic valve replacement.    Additional findings, as above.    --- End of Report ---    < end of copied text >               [facility]  INFECTIOUS DISEASES at [place] / [place] / [place]  =======================================================                               Allen Ibanez MD#   Iam Benjamin MD*                             Opal Da Silva MD*   Michelle Dowell MD*                              Professor Emeritus:  Dr Choco Hewitt MD^            Diplomates American Board of Internal Medicine & Infectious Diseases                # [place] Office - Appt - Tel  [phone] Fax [phone]                * [place] Office - Appt - Tel [phone] Fax [phone]                      ^[place] Office - Tel  [phone] Fax [phone]                                  Hospital Consult line:  [phone]  =======================================================    RAYRAY RODRIGUEZ [id number]    Follow up: aspiration PNA  Recalled for Gram negative rods in blood culture    No fevers since 12/21    c/o left foot heel pain       Allergies:  aspirin (Short breath)  Dilaudid (Short breath)  tetanus toxoid (Short breath)  Avelox (Short breath; Pruritus)  Valium (Short breath)  codeine (Short breath)  shellfish (Anaphylaxis)  cefepime (Anaphylaxis)  penicillin (Anaphylaxis)  iodine (Short breath; Swelling)      REVIEW OF SYSTEMS:  no complaints     Physical Exam:  GEN: NAD  HEENT: normocephalic and atraumatic.   NECK: Supple.   LUNGS: CTA B/L   HEART: RRR  ABDOMEN: Soft, NT, ND.  +BS.    : No CVA tenderness  EXTREMITIES: Without  edema.  MSK: No joint swelling  NEUROLOGIC: More awake and answering questions  SKIN: Left foot heel improved         Vitals:  T(F): 99 (24 Dec 2023 08:49), Max: 99.1 (24 Dec 2023 05:30)  HR: 76 (24 Dec 2023 08:49)  BP: 138/74 (24 Dec 2023 08:49)  RR: 18 (24 Dec 2023 08:49)  SpO2: 100% (24 Dec 2023 08:28) (94% - 100%)  temp max in last 48H T(F): , Max: 99.1 (12-24-23 @ 05:30)    Current Antibiotics:  ertapenem  IVPB 1000 milliGRAM(s) IV Intermittent every 24 hours    Other medications:  albuterol/ipratropium for Nebulization 3 milliLiter(s) Nebulizer every 6 hours  apixaban 5 milliGRAM(s) Oral every 12 hours  atorvastatin 20 milliGRAM(s) Oral at bedtime  clopidogrel Tablet 75 milliGRAM(s) Oral daily  dextrose 5%. 1000 milliLiter(s) IV Continuous <Continuous>  dextrose 5%. 1000 milliLiter(s) IV Continuous <Continuous>  dextrose 50% Injectable 25 Gram(s) IV Push once  dextrose 50% Injectable 25 Gram(s) IV Push once  dextrose 50% Injectable 12.5 Gram(s) IV Push once  diphenhydrAMINE Injectable 50 milliGRAM(s) IV Push daily  famotidine    Tablet 20 milliGRAM(s) Oral daily  gabapentin 100 milliGRAM(s) Oral every 8 hours  glucagon  Injectable 1 milliGRAM(s) IntraMuscular once  guaiFENesin ER 1200 milliGRAM(s) Oral every 12 hours  influenza  Vaccine (HIGH DOSE) 0.7 milliLiter(s) IntraMuscular once  insulin glargine Injectable (LANTUS) 16 Unit(s) SubCutaneous at bedtime  insulin lispro (ADMELOG) corrective regimen sliding scale   SubCutaneous three times a day before meals  insulin lispro Injectable (ADMELOG) 5 Unit(s) SubCutaneous three times a day before meals  levETIRAcetam 500 milliGRAM(s) Oral two times a day  methylPREDNISolone 8 milliGRAM(s) Oral daily  mexiletine 200 milliGRAM(s) Oral every 8 hours  montelukast 10 milliGRAM(s) Oral daily  tiotropium 2.5 MICROgram(s) Inhaler 2 Puff(s) Inhalation daily                            8.8    10.10 )-----------( 321      ( 24 Dec 2023 07:57 )             32.0     12-23    143  |  104  |  8.2  ----------------------------<  107<H>  4.0   |  29.0  |  0.54    Ca    8.4      23 Dec 2023 04:44  Phos  2.3     12-23  Mg     1.9     12-23    TPro  5.5<L>  /  Alb  2.7<L>  /  TBili  <0.2<L>  /  DBili  x   /  AST  16  /  ALT  9   /  AlkPhos  78  12-23    RECENT CULTURES:  12-21 @ 17:07    RVP  NotDetec    12-21 @ 12:00 .Blood Blood-Peripheral Blood Culture PCR    Growth in anaerobic bottle: Gram Negative Rods  Direct identification is available within approximately 3-5  hours either by Blood Panel Multiplexed PCR or Direct  MALDI-TOF. Details: [email or web address]  Growth in anaerobic bottle: Gram Negative Rods    12-21 @ 11:50 .Blood Blood-Peripheral     No growth at 48 Hours      WBC Count: 10.10 K/uL (12-24-23 @ 07:57)  WBC Count: 10.08 K/uL (12-23-23 @ 04:44)  WBC Count: 24.77 K/uL (12-21-23 @ 12:00)    Creatinine: 0.54 mg/dL (12-23-23 @ 04:44)  Creatinine: 0.94 mg/dL (12-21-23 @ 12:00)    Procalcitonin, Serum: 0.52 ng/mL (12-23-23 @ 04:44)     SARS-CoV-2: NotDete (12-21-23 @ 17:07)  SARS-CoV-2 Result: NotDete (12-21-23 @ 12:00)          < from: CT Chest No Cont (12.21.23 @ 14:35) >  ACC: [id number] EXAM:  CT CHEST   ORDERED BY: WILLIAM WIEDEMANN     ACC: [id number] EXAM:  CT ABDOMEN AND PELVIS   ORDERED BY: MICHAEL COX     PROCEDURE DATE:  12/21/2023      INTERPRETATION:  CT CHEST, ABDOMEN AND PELVIS WITHOUT CONTRAST    Clinical Indication: Pneumonia.  Vomiting and fevers with abd pain.   Patient has colostomy bag with no output for 24 hours, evaluate for   obstruction.  History of anal squamous cell carcinoma.    Technique: Axial CT images of the chest, abdomen and pelvis were obtained   from the lung apices to the pubic symphysis without oral or IV contrast.    Coronal and sagittal reformatted images were created and reviewed.    Comparison:  Prior CT of the chest from 10/28/2023, CT of the chest from   9/28/2023,CT abdomen and pelvis from 8/23/2023.  CT of the chest,   abdomen and pelvis from 9/1/2023, 8/1/2023.  CT of the abdomen and pelvis   from 9/18/2017.  CT of the chest from 9/22/2016.    Findings:  CHEST:    LUNGS AND LARGE AIRWAYS:   The tracheobronchial tree is patent. Bilateral   patchy airspace opacities are noted, right lung worse than left, and most   prominently seen in the right middle lobe; findings are significantly   worsened compared to the prior study from 10/28/2023. Trace secretions  are seen posteriorly in the trachea. Mild right apical paraseptal   emphysematous changes.    PLEURA: No pleural effusion. No pneumothorax.    VESSELS: Status post ascending aortic repair and aortic valve replacement   with redemonstrated valve-in-valve TAVR. Known residual dissection   involving the thoracoabdominal aorta is better seen on recent   contrast-enhanced CT from August and September, 2023. The pulmonary   artery is mildly enlarged, correlate for pulmonary artery hypertension.    HEART: Heart size is normal. No pericardial effusion.  Coronary artery   calcifications.    MEDIASTINUM AND MARANDA: There is no mediastinal lymphadenopathy.    Evaluation for hilar lymphadenopathy is limited without IV contrast,   however there is no gross hilar lymphadenopathy.    CHEST WALL AND LOWER NECK: There is no axillary lymphadenopathy.    ABDOMEN/PELVIS:  LIVER: Unenhanced liver appears within normal limits.  BILE DUCTS: Normal caliber.  GALLBLADDER: Within normal limits.  SPLEEN: Unenhancedspleen appears within normal limits.  PANCREAS: Unenhanced pancreas again appears atrophic.  Remonstrated   pancreatic hypodensities, measuring up to 2.4 cm at the pancreatic tail,   stable from multiple prior studies.  ADRENALS: Unenhanced adrenals appear within normal limits.  KIDNEYS/URETERS: No hydroureteronephrosis.  Known, partially calcified   left renal cysts appear grossly patent.    BLADDER: Appears grossly within normal limits, partially obscured by   metallic streak artifact from orthopedic hardware in the pubic symphysis    REPRODUCTIVE ORGANS: Hysterectomy.    BOWEL:  Status post abdominoperineal resection with left-sided colostomy.   Moderate to large amount of stool throughout the colon extending to the   ostomy, correlate forconstipation. No evidence for bowel obstruction or   inflammation.    Appendix is normal.    PERITONEUM: No ascites. Postsurgical presacral changes suboptimally   assessed however without significant change from prior studies.  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  Scattered   atherosclerotic calcifications of the aortoiliac tree and proximal thigh   vasculature.    LYMPH NODES: No lymphadenopathy.  ABDOMINAL WALL: Postsurgical changes.  BONES: No suspicious osseous lesion. Several densely sclerotic foci   throughout the vertebral bodies and ribs are stable from multiple prior   studiesand probably represent bone islands. Median sternotomy wires are   midline appear intact. Status post plate-screw fixation of pubic   symphysis fracture.  Redemonstrated horizontally oriented orthopedic   screw traversing the right ilium and sacroiliac joint  Mild lumbar   scoliosis, convex to the left with the apex at L4-L5.  Mild spinal canal   stenosis at L3-L4 and L4-L5 with posterior disc bulges.   Avascular   necrosis in both femoral heads again seen.    OTHER:  None    IMPRESSION:  Multifocal pneumonia, as detailed above; findings are progressed since   the prior CT of the chest from 10/28/2023. Recommend clinical correlation   and follow-up imaging to document resolution.    Status post abdominoperineal resection with left-sided colostomy.   Moderate to large amount of stool throughout the colon extending to the   ostomy, correlate for constipation. No evidence for bowel obstruction or   inflammation.    Appendix is normal.    Status post ascending aortic repair and aortic valve replacement.    Additional findings, as above.    --- End of Report ---    < end of copied text >

## 2023-12-25 NOTE — PROGRESS NOTE ADULT - TIME BILLING
This includes chart review, patient assessment, discussion with Dr Barclay. Antibiotic dosing and management with clinical pharmacy.

## 2023-12-26 LAB
-  AMPICILLIN/SULBACTAM: SIGNIFICANT CHANGE UP
-  AMPICILLIN/SULBACTAM: SIGNIFICANT CHANGE UP
-  AMPICILLIN: SIGNIFICANT CHANGE UP
-  AMPICILLIN: SIGNIFICANT CHANGE UP
-  AZTREONAM: SIGNIFICANT CHANGE UP
-  AZTREONAM: SIGNIFICANT CHANGE UP
-  CEFAZOLIN: SIGNIFICANT CHANGE UP
-  CEFAZOLIN: SIGNIFICANT CHANGE UP
-  CEFEPIME: SIGNIFICANT CHANGE UP
-  CEFEPIME: SIGNIFICANT CHANGE UP
-  CEFTRIAXONE: SIGNIFICANT CHANGE UP
-  CEFTRIAXONE: SIGNIFICANT CHANGE UP
-  CIPROFLOXACIN: SIGNIFICANT CHANGE UP
-  CIPROFLOXACIN: SIGNIFICANT CHANGE UP
-  ERTAPENEM: SIGNIFICANT CHANGE UP
-  ERTAPENEM: SIGNIFICANT CHANGE UP
-  GENTAMICIN: SIGNIFICANT CHANGE UP
-  GENTAMICIN: SIGNIFICANT CHANGE UP
-  LEVOFLOXACIN: SIGNIFICANT CHANGE UP
-  LEVOFLOXACIN: SIGNIFICANT CHANGE UP
-  MEROPENEM: SIGNIFICANT CHANGE UP
-  MEROPENEM: SIGNIFICANT CHANGE UP
-  PIPERACILLIN/TAZOBACTAM: SIGNIFICANT CHANGE UP
-  PIPERACILLIN/TAZOBACTAM: SIGNIFICANT CHANGE UP
-  TOBRAMYCIN: SIGNIFICANT CHANGE UP
-  TOBRAMYCIN: SIGNIFICANT CHANGE UP
-  TRIMETHOPRIM/SULFAMETHOXAZOLE: SIGNIFICANT CHANGE UP
-  TRIMETHOPRIM/SULFAMETHOXAZOLE: SIGNIFICANT CHANGE UP
ALBUMIN SERPL ELPH-MCNC: 3.1 G/DL — LOW (ref 3.3–5.2)
ALBUMIN SERPL ELPH-MCNC: 3.1 G/DL — LOW (ref 3.3–5.2)
ALP SERPL-CCNC: 85 U/L — SIGNIFICANT CHANGE UP (ref 40–120)
ALP SERPL-CCNC: 85 U/L — SIGNIFICANT CHANGE UP (ref 40–120)
ALT FLD-CCNC: 9 U/L — SIGNIFICANT CHANGE UP
ALT FLD-CCNC: 9 U/L — SIGNIFICANT CHANGE UP
ANION GAP SERPL CALC-SCNC: 12 MMOL/L — SIGNIFICANT CHANGE UP (ref 5–17)
ANION GAP SERPL CALC-SCNC: 12 MMOL/L — SIGNIFICANT CHANGE UP (ref 5–17)
AST SERPL-CCNC: 12 U/L — SIGNIFICANT CHANGE UP
AST SERPL-CCNC: 12 U/L — SIGNIFICANT CHANGE UP
BASOPHILS # BLD AUTO: 0.02 K/UL — SIGNIFICANT CHANGE UP (ref 0–0.2)
BASOPHILS # BLD AUTO: 0.02 K/UL — SIGNIFICANT CHANGE UP (ref 0–0.2)
BASOPHILS NFR BLD AUTO: 0.2 % — SIGNIFICANT CHANGE UP (ref 0–2)
BASOPHILS NFR BLD AUTO: 0.2 % — SIGNIFICANT CHANGE UP (ref 0–2)
BILIRUB SERPL-MCNC: 0.2 MG/DL — LOW (ref 0.4–2)
BILIRUB SERPL-MCNC: 0.2 MG/DL — LOW (ref 0.4–2)
BUN SERPL-MCNC: 12.8 MG/DL — SIGNIFICANT CHANGE UP (ref 8–20)
BUN SERPL-MCNC: 12.8 MG/DL — SIGNIFICANT CHANGE UP (ref 8–20)
CALCIUM SERPL-MCNC: 9.3 MG/DL — SIGNIFICANT CHANGE UP (ref 8.4–10.5)
CALCIUM SERPL-MCNC: 9.3 MG/DL — SIGNIFICANT CHANGE UP (ref 8.4–10.5)
CHLORIDE SERPL-SCNC: 97 MMOL/L — SIGNIFICANT CHANGE UP (ref 96–108)
CHLORIDE SERPL-SCNC: 97 MMOL/L — SIGNIFICANT CHANGE UP (ref 96–108)
CO2 SERPL-SCNC: 27 MMOL/L — SIGNIFICANT CHANGE UP (ref 22–29)
CO2 SERPL-SCNC: 27 MMOL/L — SIGNIFICANT CHANGE UP (ref 22–29)
CREAT SERPL-MCNC: 0.56 MG/DL — SIGNIFICANT CHANGE UP (ref 0.5–1.3)
CREAT SERPL-MCNC: 0.56 MG/DL — SIGNIFICANT CHANGE UP (ref 0.5–1.3)
CULTURE RESULTS: ABNORMAL
CULTURE RESULTS: ABNORMAL
CULTURE RESULTS: SIGNIFICANT CHANGE UP
CULTURE RESULTS: SIGNIFICANT CHANGE UP
EGFR: 95 ML/MIN/1.73M2 — SIGNIFICANT CHANGE UP
EGFR: 95 ML/MIN/1.73M2 — SIGNIFICANT CHANGE UP
EOSINOPHIL # BLD AUTO: 0 K/UL — SIGNIFICANT CHANGE UP (ref 0–0.5)
EOSINOPHIL # BLD AUTO: 0 K/UL — SIGNIFICANT CHANGE UP (ref 0–0.5)
EOSINOPHIL NFR BLD AUTO: 0 % — SIGNIFICANT CHANGE UP (ref 0–6)
EOSINOPHIL NFR BLD AUTO: 0 % — SIGNIFICANT CHANGE UP (ref 0–6)
GLUCOSE BLDC GLUCOMTR-MCNC: 141 MG/DL — HIGH (ref 70–99)
GLUCOSE BLDC GLUCOMTR-MCNC: 141 MG/DL — HIGH (ref 70–99)
GLUCOSE BLDC GLUCOMTR-MCNC: 191 MG/DL — HIGH (ref 70–99)
GLUCOSE BLDC GLUCOMTR-MCNC: 191 MG/DL — HIGH (ref 70–99)
GLUCOSE BLDC GLUCOMTR-MCNC: 284 MG/DL — HIGH (ref 70–99)
GLUCOSE BLDC GLUCOMTR-MCNC: 284 MG/DL — HIGH (ref 70–99)
GLUCOSE BLDC GLUCOMTR-MCNC: 382 MG/DL — HIGH (ref 70–99)
GLUCOSE BLDC GLUCOMTR-MCNC: 382 MG/DL — HIGH (ref 70–99)
GLUCOSE SERPL-MCNC: 371 MG/DL — HIGH (ref 70–99)
GLUCOSE SERPL-MCNC: 371 MG/DL — HIGH (ref 70–99)
HCT VFR BLD CALC: 34.8 % — SIGNIFICANT CHANGE UP (ref 34.5–45)
HCT VFR BLD CALC: 34.8 % — SIGNIFICANT CHANGE UP (ref 34.5–45)
HGB BLD-MCNC: 10.2 G/DL — LOW (ref 11.5–15.5)
HGB BLD-MCNC: 10.2 G/DL — LOW (ref 11.5–15.5)
IMM GRANULOCYTES NFR BLD AUTO: 0.9 % — SIGNIFICANT CHANGE UP (ref 0–0.9)
IMM GRANULOCYTES NFR BLD AUTO: 0.9 % — SIGNIFICANT CHANGE UP (ref 0–0.9)
LYMPHOCYTES # BLD AUTO: 0.69 K/UL — LOW (ref 1–3.3)
LYMPHOCYTES # BLD AUTO: 0.69 K/UL — LOW (ref 1–3.3)
LYMPHOCYTES # BLD AUTO: 6.8 % — LOW (ref 13–44)
LYMPHOCYTES # BLD AUTO: 6.8 % — LOW (ref 13–44)
MAGNESIUM SERPL-MCNC: 2.1 MG/DL — SIGNIFICANT CHANGE UP (ref 1.6–2.6)
MAGNESIUM SERPL-MCNC: 2.1 MG/DL — SIGNIFICANT CHANGE UP (ref 1.6–2.6)
MCHC RBC-ENTMCNC: 18.9 PG — LOW (ref 27–34)
MCHC RBC-ENTMCNC: 18.9 PG — LOW (ref 27–34)
MCHC RBC-ENTMCNC: 29.3 GM/DL — LOW (ref 32–36)
MCHC RBC-ENTMCNC: 29.3 GM/DL — LOW (ref 32–36)
MCV RBC AUTO: 64.3 FL — LOW (ref 80–100)
MCV RBC AUTO: 64.3 FL — LOW (ref 80–100)
METHOD TYPE: SIGNIFICANT CHANGE UP
METHOD TYPE: SIGNIFICANT CHANGE UP
MONOCYTES # BLD AUTO: 0.24 K/UL — SIGNIFICANT CHANGE UP (ref 0–0.9)
MONOCYTES # BLD AUTO: 0.24 K/UL — SIGNIFICANT CHANGE UP (ref 0–0.9)
MONOCYTES NFR BLD AUTO: 2.4 % — SIGNIFICANT CHANGE UP (ref 2–14)
MONOCYTES NFR BLD AUTO: 2.4 % — SIGNIFICANT CHANGE UP (ref 2–14)
NEUTROPHILS # BLD AUTO: 9.15 K/UL — HIGH (ref 1.8–7.4)
NEUTROPHILS # BLD AUTO: 9.15 K/UL — HIGH (ref 1.8–7.4)
NEUTROPHILS NFR BLD AUTO: 89.7 % — HIGH (ref 43–77)
NEUTROPHILS NFR BLD AUTO: 89.7 % — HIGH (ref 43–77)
ORGANISM # SPEC MICROSCOPIC CNT: ABNORMAL
ORGANISM # SPEC MICROSCOPIC CNT: SIGNIFICANT CHANGE UP
ORGANISM # SPEC MICROSCOPIC CNT: SIGNIFICANT CHANGE UP
PHOSPHATE SERPL-MCNC: 3.1 MG/DL — SIGNIFICANT CHANGE UP (ref 2.4–4.7)
PHOSPHATE SERPL-MCNC: 3.1 MG/DL — SIGNIFICANT CHANGE UP (ref 2.4–4.7)
PLATELET # BLD AUTO: 458 K/UL — HIGH (ref 150–400)
PLATELET # BLD AUTO: 458 K/UL — HIGH (ref 150–400)
POTASSIUM SERPL-MCNC: 5.1 MMOL/L — SIGNIFICANT CHANGE UP (ref 3.5–5.3)
POTASSIUM SERPL-MCNC: 5.1 MMOL/L — SIGNIFICANT CHANGE UP (ref 3.5–5.3)
POTASSIUM SERPL-SCNC: 5.1 MMOL/L — SIGNIFICANT CHANGE UP (ref 3.5–5.3)
POTASSIUM SERPL-SCNC: 5.1 MMOL/L — SIGNIFICANT CHANGE UP (ref 3.5–5.3)
PROT SERPL-MCNC: 6.4 G/DL — LOW (ref 6.6–8.7)
PROT SERPL-MCNC: 6.4 G/DL — LOW (ref 6.6–8.7)
RBC # BLD: 5.41 M/UL — HIGH (ref 3.8–5.2)
RBC # BLD: 5.41 M/UL — HIGH (ref 3.8–5.2)
RBC # FLD: 22.5 % — HIGH (ref 10.3–14.5)
RBC # FLD: 22.5 % — HIGH (ref 10.3–14.5)
SODIUM SERPL-SCNC: 136 MMOL/L — SIGNIFICANT CHANGE UP (ref 135–145)
SODIUM SERPL-SCNC: 136 MMOL/L — SIGNIFICANT CHANGE UP (ref 135–145)
SPECIMEN SOURCE: SIGNIFICANT CHANGE UP
WBC # BLD: 10.19 K/UL — SIGNIFICANT CHANGE UP (ref 3.8–10.5)
WBC # BLD: 10.19 K/UL — SIGNIFICANT CHANGE UP (ref 3.8–10.5)
WBC # FLD AUTO: 10.19 K/UL — SIGNIFICANT CHANGE UP (ref 3.8–10.5)
WBC # FLD AUTO: 10.19 K/UL — SIGNIFICANT CHANGE UP (ref 3.8–10.5)

## 2023-12-26 PROCEDURE — 99232 SBSQ HOSP IP/OBS MODERATE 35: CPT

## 2023-12-26 RX ORDER — HUMAN INSULIN 100 [IU]/ML
7 INJECTION, SUSPENSION SUBCUTANEOUS ONCE
Refills: 0 | Status: COMPLETED | OUTPATIENT
Start: 2023-12-26 | End: 2023-12-26

## 2023-12-26 RX ORDER — FERROUS SULFATE 325(65) MG
325 TABLET ORAL DAILY
Refills: 0 | Status: DISCONTINUED | OUTPATIENT
Start: 2023-12-26 | End: 2023-12-28

## 2023-12-26 RX ORDER — SENNA PLUS 8.6 MG/1
2 TABLET ORAL AT BEDTIME
Refills: 0 | Status: DISCONTINUED | OUTPATIENT
Start: 2023-12-26 | End: 2023-12-28

## 2023-12-26 RX ORDER — POLYETHYLENE GLYCOL 3350 17 G/17G
17 POWDER, FOR SOLUTION ORAL DAILY
Refills: 0 | Status: DISCONTINUED | OUTPATIENT
Start: 2023-12-26 | End: 2023-12-26

## 2023-12-26 RX ADMIN — LACTULOSE 10 GRAM(S): 10 SOLUTION ORAL at 12:32

## 2023-12-26 RX ADMIN — MONTELUKAST 10 MILLIGRAM(S): 4 TABLET, CHEWABLE ORAL at 12:31

## 2023-12-26 RX ADMIN — INSULIN GLARGINE 16 UNIT(S): 100 INJECTION, SOLUTION SUBCUTANEOUS at 21:07

## 2023-12-26 RX ADMIN — APIXABAN 5 MILLIGRAM(S): 2.5 TABLET, FILM COATED ORAL at 17:38

## 2023-12-26 RX ADMIN — Medication 1200 MILLIGRAM(S): at 17:39

## 2023-12-26 RX ADMIN — CLOPIDOGREL BISULFATE 75 MILLIGRAM(S): 75 TABLET, FILM COATED ORAL at 12:31

## 2023-12-26 RX ADMIN — APIXABAN 5 MILLIGRAM(S): 2.5 TABLET, FILM COATED ORAL at 05:42

## 2023-12-26 RX ADMIN — Medication 1200 MILLIGRAM(S): at 05:42

## 2023-12-26 RX ADMIN — Medication 3 MILLILITER(S): at 08:25

## 2023-12-26 RX ADMIN — MEXILETINE HYDROCHLORIDE 200 MILLIGRAM(S): 150 CAPSULE ORAL at 13:40

## 2023-12-26 RX ADMIN — Medication 3 MILLILITER(S): at 15:17

## 2023-12-26 RX ADMIN — MEXILETINE HYDROCHLORIDE 200 MILLIGRAM(S): 150 CAPSULE ORAL at 21:06

## 2023-12-26 RX ADMIN — Medication 5 UNIT(S): at 12:30

## 2023-12-26 RX ADMIN — SENNA PLUS 2 TABLET(S): 8.6 TABLET ORAL at 21:06

## 2023-12-26 RX ADMIN — Medication 5: at 08:40

## 2023-12-26 RX ADMIN — LEVETIRACETAM 500 MILLIGRAM(S): 250 TABLET, FILM COATED ORAL at 17:38

## 2023-12-26 RX ADMIN — HUMAN INSULIN 7 UNIT(S): 100 INJECTION, SUSPENSION SUBCUTANEOUS at 12:29

## 2023-12-26 RX ADMIN — GABAPENTIN 100 MILLIGRAM(S): 400 CAPSULE ORAL at 21:06

## 2023-12-26 RX ADMIN — Medication 3: at 12:30

## 2023-12-26 RX ADMIN — Medication 5 UNIT(S): at 08:40

## 2023-12-26 RX ADMIN — LEVETIRACETAM 500 MILLIGRAM(S): 250 TABLET, FILM COATED ORAL at 05:43

## 2023-12-26 RX ADMIN — ERTAPENEM SODIUM 120 MILLIGRAM(S): 1 INJECTION, POWDER, LYOPHILIZED, FOR SOLUTION INTRAMUSCULAR; INTRAVENOUS at 05:43

## 2023-12-26 RX ADMIN — GABAPENTIN 100 MILLIGRAM(S): 400 CAPSULE ORAL at 05:42

## 2023-12-26 RX ADMIN — MEXILETINE HYDROCHLORIDE 200 MILLIGRAM(S): 150 CAPSULE ORAL at 05:43

## 2023-12-26 RX ADMIN — Medication 3 MILLILITER(S): at 22:07

## 2023-12-26 RX ADMIN — Medication 325 MILLIGRAM(S): at 13:40

## 2023-12-26 RX ADMIN — ATORVASTATIN CALCIUM 20 MILLIGRAM(S): 80 TABLET, FILM COATED ORAL at 21:06

## 2023-12-26 RX ADMIN — Medication 50 MILLIGRAM(S): at 05:40

## 2023-12-26 RX ADMIN — Medication 1: at 17:40

## 2023-12-26 RX ADMIN — Medication 5 UNIT(S): at 17:39

## 2023-12-26 RX ADMIN — FAMOTIDINE 20 MILLIGRAM(S): 10 INJECTION INTRAVENOUS at 12:31

## 2023-12-26 RX ADMIN — GABAPENTIN 100 MILLIGRAM(S): 400 CAPSULE ORAL at 13:40

## 2023-12-26 RX ADMIN — TIOTROPIUM BROMIDE 2 PUFF(S): 18 CAPSULE ORAL; RESPIRATORY (INHALATION) at 08:25

## 2023-12-26 NOTE — PROGRESS NOTE ADULT - ASSESSMENT
73y  Female with h/o A-fib, COPD, TAVR, tracheomalacia, aortic dissection, ostomy secondary to colorectal cancer, adrenal insufficiency, on steroids. Patient presents to the ED complaining of nausea, vomiting and SOB. Patient reports not feeling well, unable to elaborate on her symptoms. In the ER patient is febrile to 101.9F, leukocytosis. Started on Ertapenem.    ESBL Proteus bacteremia/sepsis  Aspiration PNA  Fever  Leukocytosis   Multiple antibiotic allergies   Left foot pain       - Blood cultures 12/21 reporting ESBL Proteus   - Repeat blood cultures 12/24 no growth   - Sputum Cx from Oct 28, 2023 reporting ESBL Proteus   - RVP/COVID 19 PCR 12/21 negative   - CT Chest reporting multifocal PNA   - CT A/P without contrast reporting large stool in colon, no other acute findings   - Procalcitonin level 0.52  - Continue ertapenem with benadryl   - Patient takes benadryl daily when on antibiotics to prevent allergy, will continue this way for now. Needs to see allergist as outpatient.   - swallow eval noted   - Source is likely aspiration PNA  - CT A/P with contrast reporting no intra-Abd source.   - Will need Ertapenem till 1/4/24  - Trend Fever  - Trend WBC      Will sign off. Please call PRN.       Discussed treatment plan with: Dr Dolan

## 2023-12-26 NOTE — PROGRESS NOTE ADULT - ASSESSMENT
73-year-old female patient with a history of A-fib, COPD, TAVR,  tracheomalacia, aortic dissection, ostomy secondary to colorectal cancer, adrenal insufficiency, on steroids, presents to the ED complaining of nausea, vomiting and SOB. Pt is a poor historian and not able to provide a full history. Only reports not feeling well and worsening SOB with productive cough. Per daughter over the phone, was not feeling well, was confused, with decrease appetite, not drinking, had an episode of vomiting. In Ed, was febrile with elevated WBC, found to be septic.     #Sepsis secondary to multifocal PNA and ESBL proteus bacteremia  sepsis resolved.    - c/w Ertapenem + diphenhydramine 50mg IV daily  although pneumonia is being considered the source of bacteremia, ID wanted to r/o other sources of bacteremia and CT abdomen and pelvis was recommended. d/w Dr. Benjamin.    #. COPD   exacerbation resolved.  - c/w tiotropium 2.5 micrograms 2 puff daily  - c/w albuterol/ipratropium 3 mL q6   - c/w Mucinex 1200 oral q12  - will dc methylprednisolone 8 mg daily  - c/w montelukast 10 mg dialy  - c/w Zofran 4mg q6  - sent repeat BCx  - f/u repeat BCx  - ID recs appreciated    #Left foot pain  this has almost resolved  - c/w elevation of L foot  - f/u [facility] [employer] official read  - appreciate podiatry recs    #Microcytic Anemia  - H/H noted to be 8.8/32.0  - MCV at 66.3   - iron studies normal. this could be due to ?thalassemia    - c/w trend h/h     #Chronic afib   - c/w Eliquis 5mg q12  - c/w Mexiletine 200 mg q8     #Type 2 DM with neuropathy  - a1c ~9. blood glucose controlled due to steroids. since steroids will be stopped hoping for blood glucose to improve.  - c/w Lantus 16 units and Admelog 5 TID   - c/w Gabapentin 100 mg q8     #CAD   - c/w Plavix 75mg daily  - c/w atorvastatin 20 mg at bedtime    #Seizures   - c/w levetiracetam 500mg BID    S/p Colostomy   - c/w Senna daily     Dispo: Active 73-year-old female patient with a history of A-fib, COPD, TAVR,  tracheomalacia, aortic dissection, ostomy secondary to colorectal cancer, adrenal insufficiency, on steroids, presents to the ED complaining of nausea, vomiting and SOB. Pt is a poor historian and not able to provide a full history. Only reports not feeling well and worsening SOB with productive cough. Per daughter over the phone, was not feeling well, was confused, with decrease appetite, not drinking, had an episode of vomiting. In Ed, was febrile with elevated WBC, found to be septic.     #Sepsis secondary to multifocal PNA and ESBL proteus bacteremia  sepsis resolved.    - c/w Ertapenem + diphenhydramine 50mg IV daily  - CT abdomen/pelvis negative for any source of possible bacteremia     #. COPD   exacerbation resolved.  - c/w tiotropium 2.5 micrograms 2 puff daily  - c/w albuterol/ipratropium 3 mL q6   - c/w Mucinex 1200 oral q12  - restarted methylprednisolone 8 mg daily  - c/w montelukast 10 mg daily  - c/w Zofran 4mg q6  - f/u repeat BCx: NGTD  - ID recs appreciated    #Left foot pain  this has almost resolved  - c/w elevation of L foot  - f/u [facility] [employer] [facility] [employer] official read  - appreciate podiatry recs    #Microcytic Anemia  - H/H noted to be 8.8/32.0  - MCV at 66.3   - iron studies normal. this could be due to ?thalassemia    - c/w trend h/h     #Chronic afib   - c/w Eliquis 5mg q12  - c/w Mexiletine 200 mg q8     #Type 2 DM with neuropathy  - a1c ~9. blood glucose controlled due to steroids. since steroids will be stopped hoping for blood glucose to improve.  - c/w Lantus 16 units and Admelog 5 TID   - c/w Gabapentin 100 mg q8     #CAD   - c/w Plavix 75mg daily  - c/w atorvastatin 20 mg at bedtime    #Seizures   - c/w levetiracetam 500mg BID    S/p Colostomy   - c/w Senna daily     Dispo: Active 73-year-old female patient with a history of A-fib, COPD, TAVR,  tracheomalacia, aortic dissection, ostomy secondary to colorectal cancer, adrenal insufficiency, on steroids, presents to the ED complaining of nausea, vomiting and SOB. Pt is a poor historian and not able to provide a full history. Only reports not feeling well and worsening SOB with productive cough. Per daughter over the phone, was not feeling well, was confused, with decrease appetite, not drinking, had an episode of vomiting. In Ed, was febrile with elevated WBC, found to be septic.     #Sepsis secondary to multifocal PNA and ESBL proteus bacteremia  sepsis resolved.    - c/w Ertapenem + diphenhydramine 50mg IV daily  - CT abdomen/pelvis negative for any source of possible bacteremia     #. COPD   exacerbation resolved.  - c/w tiotropium 2.5 micrograms 2 puff daily  - c/w albuterol/ipratropium 3 mL q6   - c/w Mucinex 1200 oral q12  - restarted methylprednisolone 8 mg daily  - c/w montelukast 10 mg daily  - c/w Zofran 4mg q6  - f/u repeat BCx: NGTD  - ID recs appreciated    #Left foot pain  this has almost resolved  - c/w elevation of L foot  - f/u [facility] [employer] [facility] [employer] official read  - appreciate podiatry recs    #Microcytic Anemia  - H/H noted to be 8.8/32.0  - MCV at 66.3   - will start ferrous sulfate 325 mg daily  - c/w trend h/h     #Chronic afib   - c/w Eliquis 5mg q12  - c/w Mexiletine 200 mg q8     #Type 2 DM with neuropathy  - a1c ~9.   - c/w Lantus 16 units and Admelog 5 TID   - c/w Gabapentin 100 mg q8     #CAD   - c/w Plavix 75mg daily  - c/w atorvastatin 20 mg at bedtime    #Seizures   - c/w levetiracetam 500mg BID    S/p Colostomy   - c/w Senna daily     Dispo: Active 73-year-old female patient with a history of A-fib, COPD, TAVR,  tracheomalacia, aortic dissection, ostomy secondary to colorectal cancer, adrenal insufficiency, on steroids, presents to the ED complaining of nausea, vomiting and SOB. Pt is a poor historian and not able to provide a full history. Only reports not feeling well and worsening SOB with productive cough. Per daughter over the phone, was not feeling well, was confused, with decrease appetite, not drinking, had an episode of vomiting. In Ed, was febrile with elevated WBC, found to be septic.     #Sepsis secondary to multifocal PNA and ESBL proteus bacteremia  sepsis resolved.    - c/w Ertapenem + diphenhydramine 50mg IV daily  - CT abdomen/pelvis negative for any source of possible bacteremia     #. COPD   exacerbation resolved.  - c/w tiotropium 2.5 micrograms 2 puff daily  - c/w albuterol/ipratropium 3 mL q6   - c/w Mucinex 1200 oral q12  - restarted methylprednisolone 8 mg daily  - c/w montelukast 10 mg daily  - c/w Zofran 4mg q6  - f/u repeat BCx: NGTD  - ID recs appreciated    #Left foot pain  this has almost resolved  - c/w elevation of L foot  - f/u [facility] [employer] [facility] [employer] official read  - appreciate podiatry recs    #Microcytic Anemia  - H/H noted to be 8.8/32.0  - MCV at 66.3   - will start ferrous sulfate 325 mg daily  - c/w trend h/h     #Chronic afib   - c/w Eliquis 5mg q12  - c/w Mexiletine 200 mg q8     #Type 2 DM with neuropathy  - a1c ~9.   - Glucose noted to be elevated today  - likely 2/2 to steroids given pre-CT for IV allergy  - c/w to monitor glucose level  - c/w Lantus 16 units and Admelog 5 TID   - c/w Gabapentin 100 mg q8     #CAD   - c/w Plavix 75mg daily  - c/w atorvastatin 20 mg at bedtime    #Seizures   - c/w levetiracetam 500mg BID    S/p Colostomy   - c/w Senna daily     Dispo: Active 73-year-old female patient with a history of A-fib, COPD, TAVR,  tracheomalacia, aortic dissection, ostomy secondary to colorectal cancer, adrenal insufficiency, on steroids, presents to the ED complaining of nausea, vomiting and SOB. Pt is a poor historian and not able to provide a full history. Only reports not feeling well and worsening SOB with productive cough. Per daughter over the phone, was not feeling well, was confused, with decrease appetite, not drinking, had an episode of vomiting. In Ed, was febrile with elevated WBC, found to be septic.     #Sepsis secondary to multifocal PNA and ESBL proteus bacteremia  sepsis resolved.    - c/w Ertapenem + diphenhydramine 50mg IV daily  - CT abdomen/pelvis negative for any source of possible bacteremia     #. COPD   exacerbation resolved.  - c/w tiotropium 2.5 micrograms 2 puff daily  - c/w albuterol/ipratropium 3 mL q6   - c/w Mucinex 1200 oral q12  - restarted methylprednisolone 8 mg daily  - c/w montelukast 10 mg daily  - c/w Zofran 4mg q6  - f/u repeat BCx: NGTD  - ID recs appreciated    #Left foot pain  this has almost resolved  - c/w elevation of L foot  - f/u [facility] [employer] [facility] [employer] official read  - appreciate podiatry recs    #Microcytic Anemia  - H/H noted to be 8.8/32.0  - MCV at 66.3   - will start ferrous sulfate 325 mg daily  - c/w trend h/h     #Chronic afib   - c/w Eliquis 5mg q12  - c/w Mexiletine 200 mg q8     #Type 2 DM with neuropathy  - a1c ~9.   - Glucose noted to be elevated today  - likely 2/2 to steroids given pre-CT for IV contrast allergy  - c/w to monitor glucose level  - c/w Lantus 16 units and Admelog 5 TID   - c/w Gabapentin 100 mg q8     #CAD   - c/w Plavix 75mg daily  - c/w atorvastatin 20 mg at bedtime    #Seizures   - c/w levetiracetam 500mg BID    S/p Colostomy   - c/w Senna daily     Dispo: Active

## 2023-12-26 NOTE — PROGRESS NOTE ADULT - SUBJECTIVE AND OBJECTIVE BOX
INCOMPLETE  73-year-old female patient with a history of A-fib, COPD, TAVR,  tracheomalacia, aortic dissection, ostomy secondary to colorectal cancer, adrenal insufficiency, on steroids, presents to the ED complaining of nausea, vomiting and SOB.    INTERVAL HPI/OVERNIGHT EVENTS:  - No acute events    SUBJECTIVE: Patient seen and examined at bedside.     ROS: All negative except as listed above.    VITAL SIGNS:  ICU Vital Signs Last 24 Hrs  T(C): 36.4 (26 Dec 2023 05:46), Max: 37.2 (25 Dec 2023 10:30)  T(F): 97.5 (26 Dec 2023 05:46), Max: 99 (25 Dec 2023 20:00)  HR: 71 (26 Dec 2023 05:46) (71 - 81)  BP: 133/75 (26 Dec 2023 05:46) (125/76 - 148/89)  RR: 18 (26 Dec 2023 05:46) (18 - 18)  SpO2: 97% (26 Dec 2023 05:46) (92% - 97%)    O2 Parameters below as of 26 Dec 2023 05:46  Patient On (Oxygen Delivery Method): room air    Plateau pressure:   P/F ratio:     12-25 @ 07:01  -  12-26 @ 07:00  --------------------------------------------------------  IN: 0 mL / OUT: 1100 mL / NET: -1100 mL      CAPILLARY BLOOD GLUCOSE    POCT Blood Glucose.: 264 mg/dL (25 Dec 2023 21:30)      ECG: reviewed.    PHYSICAL EXAM:    GENERAL: NAD  HEAD: Swollen Eyelids, no JVD  EYES: EOMI, PERRLA, conjunctiva and sclera clear  NECK: Supple, trachea midline, no JVD  HEART: Irregular rate and rhythm, no murmurs,   LUNGS: CTAB  ABDOMEN: Soft, nontender, nondistended, colostomy in place  EXTREMITIES: No edema  NERVOUS SYSTEM:  A&Ox2, no focal deficits     MEDICATIONS:  MEDICATIONS  (STANDING):  albuterol/ipratropium for Nebulization 3 milliLiter(s) Nebulizer every 6 hours  apixaban 5 milliGRAM(s) Oral every 12 hours  atorvastatin 20 milliGRAM(s) Oral at bedtime  clopidogrel Tablet 75 milliGRAM(s) Oral daily  dextrose 5%. 1000 milliLiter(s) (100 mL/Hr) IV Continuous <Continuous>  dextrose 5%. 1000 milliLiter(s) (50 mL/Hr) IV Continuous <Continuous>  dextrose 50% Injectable 25 Gram(s) IV Push once  dextrose 50% Injectable 12.5 Gram(s) IV Push once  dextrose 50% Injectable 25 Gram(s) IV Push once  diphenhydrAMINE Injectable 50 milliGRAM(s) IV Push daily  ertapenem  IVPB 1000 milliGRAM(s) IV Intermittent every 24 hours  famotidine    Tablet 20 milliGRAM(s) Oral daily  gabapentin 100 milliGRAM(s) Oral every 8 hours  glucagon  Injectable 1 milliGRAM(s) IntraMuscular once  guaiFENesin ER 1200 milliGRAM(s) Oral every 12 hours  influenza  Vaccine (HIGH DOSE) 0.7 milliLiter(s) IntraMuscular once  insulin glargine Injectable (LANTUS) 16 Unit(s) SubCutaneous at bedtime  insulin lispro (ADMELOG) corrective regimen sliding scale   SubCutaneous three times a day before meals  insulin lispro Injectable (ADMELOG) 5 Unit(s) SubCutaneous three times a day before meals  levETIRAcetam 500 milliGRAM(s) Oral two times a day  methylPREDNISolone sodium succinate Injectable 40 milliGRAM(s) IV Push every 4 hours  mexiletine 200 milliGRAM(s) Oral every 8 hours  montelukast 10 milliGRAM(s) Oral daily  tiotropium 2.5 MICROgram(s) Inhaler 2 Puff(s) Inhalation daily    MEDICATIONS  (PRN):  dextrose Oral Gel 15 Gram(s) Oral once PRN Blood Glucose LESS THAN 70 milliGRAM(s)/deciliter  lactulose Syrup 10 Gram(s) Oral daily PRN constipation  ondansetron    Tablet 4 milliGRAM(s) Oral every 6 hours PRN for nausea    ALLERGIES:  Allergies    aspirin (Short breath)  Dilaudid (Short breath)  tetanus toxoid (Short breath)  Avelox (Short breath; Pruritus)  Valium (Short breath)  codeine (Short breath)  shellfish (Anaphylaxis)  cefepime (Anaphylaxis)  penicillin (Anaphylaxis)  iodine (Short breath; Swelling)    Intolerances      LABS:                        10.2   10.19 )-----------( 458      ( 26 Dec 2023 04:40 )             34.8     12-26    136  |  97  |  12.8  ----------------------------<  371<H>  5.1   |  27.0  |  0.56    Ca    9.3      26 Dec 2023 04:40  Phos  3.1     12-26  Mg     2.1     12-26    TPro  6.4<L>  /  Alb  3.1<L>  /  TBili  0.2<L>  /  DBili  x   /  AST  12  /  ALT  9   /  AlkPhos  85  12-26      Urinalysis Basic - ( 26 Dec 2023 04:40 )    Color: x / Appearance: x / SG: x / pH: x  Gluc: 371 mg/dL / Ketone: x  / Bili: x / Urobili: x   Blood: x / Protein: x / Nitrite: x   Leuk Esterase: x / RBC: x / WBC x   Sq Epi: x / Non Sq Epi: x / Bacteria: x      Micro:    Culture - Blood (collected 12-24-23 @ 11:12)  Source: .Blood Blood-Peripheral  Preliminary Report (12-25-23 @ 18:02):    No growth at 24 hours    Culture - Blood (collected 12-24-23 @ 11:09)  Source: .Blood Blood-Peripheral  Preliminary Report (12-25-23 @ 18:02):    No growth at 24 hours    Culture - Blood (collected 12-21-23 @ 12:00)  Source: .Blood Blood-Peripheral  Gram Stain (12-24-23 @ 06:42):    Growth in anaerobic bottle: Gram Negative Rods  Preliminary Report (12-25-23 @ 11:13):    Growth in anaerobic bottle: Proteus mirabilis    Direct identification is available within approximately 3-5    hours either by Blood Panel Multiplexed PCR or Direct    MALDI-TOF. Details: [email or web address]  Organism: Blood Culture PCR (12-24-23 @ 07:37)  Organism: Blood Culture PCR (12-24-23 @ 07:37)      Method Type: PCR      -  Proteus species: Detec      -  ESBL: Detec      -  CTX-M Resistance Marker: Detec    Culture - Blood (collected 12-21-23 @ 11:50)  Source: .Blood Blood-Peripheral  Preliminary Report (12-25-23 @ 16:01):    No growth at 4 days          RADIOLOGY & ADDITIONAL TESTS: Reviewed. INCOMPLETE  73-year-old female patient with a history of A-fib, COPD, TAVR,  tracheomalacia, aortic dissection, ostomy secondary to colorectal cancer, adrenal insufficiency, on steroids, presents to the ED complaining of nausea, vomiting and SOB.    INTERVAL HPI/OVERNIGHT EVENTS:  - No acute events    SUBJECTIVE: Patient seen and examined at bedside.     ROS: All negative except as listed above.    VITAL SIGNS:  ICU Vital Signs Last 24 Hrs  T(C): 36.4 (26 Dec 2023 05:46), Max: 37.2 (25 Dec 2023 10:30)  T(F): 97.5 (26 Dec 2023 05:46), Max: 99 (25 Dec 2023 20:00)  HR: 71 (26 Dec 2023 05:46) (71 - 81)  BP: 133/75 (26 Dec 2023 05:46) (125/76 - 148/89)  RR: 18 (26 Dec 2023 05:46) (18 - 18)  SpO2: 97% (26 Dec 2023 05:46) (92% - 97%)    O2 Parameters below as of 26 Dec 2023 05:46  Patient On (Oxygen Delivery Method): room air    Plateau pressure:   P/F ratio:     12-25 @ 07:01  -  12-26 @ 07:00  --------------------------------------------------------  IN: 0 mL / OUT: 1100 mL / NET: -1100 mL      CAPILLARY BLOOD GLUCOSE    POCT Blood Glucose.: 264 mg/dL (25 Dec 2023 21:30)      ECG: reviewed.    PHYSICAL EXAM:    GENERAL: NAD  HEAD: Swollen Eyelids, no JVD  EYES: EOMI, PERRLA, conjunctiva and sclera clear  NECK: Supple, trachea midline, no JVD  HEART: Irregular rate and rhythm, no murmurs,   LUNGS: CTAB  ABDOMEN: Soft, nontender, nondistended, colostomy in place  EXTREMITIES: No edema  NERVOUS SYSTEM:  A&Ox2, no focal deficits     MEDICATIONS:  MEDICATIONS  (STANDING):  albuterol/ipratropium for Nebulization 3 milliLiter(s) Nebulizer every 6 hours  apixaban 5 milliGRAM(s) Oral every 12 hours  atorvastatin 20 milliGRAM(s) Oral at bedtime  clopidogrel Tablet 75 milliGRAM(s) Oral daily  dextrose 5%. 1000 milliLiter(s) (100 mL/Hr) IV Continuous <Continuous>  dextrose 5%. 1000 milliLiter(s) (50 mL/Hr) IV Continuous <Continuous>  dextrose 50% Injectable 25 Gram(s) IV Push once  dextrose 50% Injectable 12.5 Gram(s) IV Push once  dextrose 50% Injectable 25 Gram(s) IV Push once  diphenhydrAMINE Injectable 50 milliGRAM(s) IV Push daily  ertapenem  IVPB 1000 milliGRAM(s) IV Intermittent every 24 hours  famotidine    Tablet 20 milliGRAM(s) Oral daily  gabapentin 100 milliGRAM(s) Oral every 8 hours  glucagon  Injectable 1 milliGRAM(s) IntraMuscular once  guaiFENesin ER 1200 milliGRAM(s) Oral every 12 hours  influenza  Vaccine (HIGH DOSE) 0.7 milliLiter(s) IntraMuscular once  insulin glargine Injectable (LANTUS) 16 Unit(s) SubCutaneous at bedtime  insulin lispro (ADMELOG) corrective regimen sliding scale   SubCutaneous three times a day before meals  insulin lispro Injectable (ADMELOG) 5 Unit(s) SubCutaneous three times a day before meals  levETIRAcetam 500 milliGRAM(s) Oral two times a day  methylPREDNISolone sodium succinate Injectable 40 milliGRAM(s) IV Push every 4 hours  mexiletine 200 milliGRAM(s) Oral every 8 hours  montelukast 10 milliGRAM(s) Oral daily  tiotropium 2.5 MICROgram(s) Inhaler 2 Puff(s) Inhalation daily    MEDICATIONS  (PRN):  dextrose Oral Gel 15 Gram(s) Oral once PRN Blood Glucose LESS THAN 70 milliGRAM(s)/deciliter  lactulose Syrup 10 Gram(s) Oral daily PRN constipation  ondansetron    Tablet 4 milliGRAM(s) Oral every 6 hours PRN for nausea    ALLERGIES:  Allergies    aspirin (Short breath)  Dilaudid (Short breath)  tetanus toxoid (Short breath)  Avelox (Short breath; Pruritus)  Valium (Short breath)  codeine (Short breath)  shellfish (Anaphylaxis)  cefepime (Anaphylaxis)  penicillin (Anaphylaxis)  iodine (Short breath; Swelling)    Intolerances      LABS:                        10.2   10.19 )-----------( 458      ( 26 Dec 2023 04:40 )             34.8     12-26    136  |  97  |  12.8  ----------------------------<  371<H>  5.1   |  27.0  |  0.56    Ca    9.3      26 Dec 2023 04:40  Phos  3.1     12-26  Mg     2.1     12-26    TPro  6.4<L>  /  Alb  3.1<L>  /  TBili  0.2<L>  /  DBili  x   /  AST  12  /  ALT  9   /  AlkPhos  85  12-26      Urinalysis Basic - ( 26 Dec 2023 04:40 )    Color: x / Appearance: x / SG: x / pH: x  Gluc: 371 mg/dL / Ketone: x  / Bili: x / Urobili: x   Blood: x / Protein: x / Nitrite: x   Leuk Esterase: x / RBC: x / WBC x   Sq Epi: x / Non Sq Epi: x / Bacteria: x      Micro:    Culture - Blood (collected 12-24-23 @ 11:12)  Source: .Blood Blood-Peripheral  Preliminary Report (12-25-23 @ 18:02):    No growth at 24 hours    Culture - Blood (collected 12-24-23 @ 11:09)  Source: .Blood Blood-Peripheral  Preliminary Report (12-25-23 @ 18:02):    No growth at 24 hours    Culture - Blood (collected 12-21-23 @ 12:00)  Source: .Blood Blood-Peripheral  Gram Stain (12-24-23 @ 06:42):    Growth in anaerobic bottle: Gram Negative Rods  Preliminary Report (12-25-23 @ 11:13):    Growth in anaerobic bottle: Proteus mirabilis    Direct identification is available within approximately 3-5    hours either by Blood Panel Multiplexed PCR or Direct    MALDI-TOF. Details: [email or web address]  Organism: Blood Culture PCR (12-24-23 @ 07:37)  Organism: Blood Culture PCR (12-24-23 @ 07:37)      Method Type: PCR      -  Proteus species: Detec      -  ESBL: Detec      -  CTX-M Resistance Marker: Detec    Culture - Blood (collected 12-21-23 @ 11:50)  Source: .Blood Blood-Peripheral  Preliminary Report (12-25-23 @ 16:01):    No growth at 4 days          RADIOLOGY & ADDITIONAL TESTS: Reviewed. 73-year-old female patient with a history of A-fib, COPD, TAVR,  tracheomalacia, aortic dissection, ostomy secondary to colorectal cancer, adrenal insufficiency, on steroids, presents to the ED complaining of nausea, vomiting and SOB.    INTERVAL HPI/OVERNIGHT EVENTS:  - No acute events    SUBJECTIVE:   - Patient seen and examined at bedside.   - Reports sleeping well overnight  - Reports continued improvement of L heel pain    ROS: All negative except as listed above.    VITAL SIGNS:  ICU Vital Signs Last 24 Hrs  T(C): 36.4 (26 Dec 2023 05:46), Max: 37.2 (25 Dec 2023 10:30)  T(F): 97.5 (26 Dec 2023 05:46), Max: 99 (25 Dec 2023 20:00)  HR: 71 (26 Dec 2023 05:46) (71 - 81)  BP: 133/75 (26 Dec 2023 05:46) (125/76 - 148/89)  RR: 18 (26 Dec 2023 05:46) (18 - 18)  SpO2: 97% (26 Dec 2023 05:46) (92% - 97%)    O2 Parameters below as of 26 Dec 2023 05:46  Patient On (Oxygen Delivery Method): room air    Plateau pressure:   P/F ratio:     12-25 @ 07:01  -  12-26 @ 07:00  --------------------------------------------------------  IN: 0 mL / OUT: 1100 mL / NET: -1100 mL      CAPILLARY BLOOD GLUCOSE    POCT Blood Glucose.: 264 mg/dL (25 Dec 2023 21:30)    ECG: reviewed.    PHYSICAL EXAM:    GENERAL: NAD  HEAD: Swollen Eyelids, no JVD  EYES: EOMI, PERRLA, conjunctiva and sclera clear  NECK: Supple, trachea midline, no JVD  HEART: Irregular rate and rhythm, no murmurs,   LUNGS: CTAB  ABDOMEN: Soft, nontender, nondistended, colostomy in place  EXTREMITIES: No edema  NERVOUS SYSTEM:  A&Ox2, no focal deficits     MEDICATIONS:  MEDICATIONS  (STANDING):  albuterol/ipratropium for Nebulization 3 milliLiter(s) Nebulizer every 6 hours  apixaban 5 milliGRAM(s) Oral every 12 hours  atorvastatin 20 milliGRAM(s) Oral at bedtime  clopidogrel Tablet 75 milliGRAM(s) Oral daily  dextrose 5%. 1000 milliLiter(s) (100 mL/Hr) IV Continuous <Continuous>  dextrose 5%. 1000 milliLiter(s) (50 mL/Hr) IV Continuous <Continuous>  dextrose 50% Injectable 25 Gram(s) IV Push once  dextrose 50% Injectable 12.5 Gram(s) IV Push once  dextrose 50% Injectable 25 Gram(s) IV Push once  diphenhydrAMINE Injectable 50 milliGRAM(s) IV Push daily  ertapenem  IVPB 1000 milliGRAM(s) IV Intermittent every 24 hours  famotidine    Tablet 20 milliGRAM(s) Oral daily  gabapentin 100 milliGRAM(s) Oral every 8 hours  glucagon  Injectable 1 milliGRAM(s) IntraMuscular once  guaiFENesin ER 1200 milliGRAM(s) Oral every 12 hours  influenza  Vaccine (HIGH DOSE) 0.7 milliLiter(s) IntraMuscular once  insulin glargine Injectable (LANTUS) 16 Unit(s) SubCutaneous at bedtime  insulin lispro (ADMELOG) corrective regimen sliding scale   SubCutaneous three times a day before meals  insulin lispro Injectable (ADMELOG) 5 Unit(s) SubCutaneous three times a day before meals  levETIRAcetam 500 milliGRAM(s) Oral two times a day  methylPREDNISolone sodium succinate Injectable 40 milliGRAM(s) IV Push every 4 hours  mexiletine 200 milliGRAM(s) Oral every 8 hours  montelukast 10 milliGRAM(s) Oral daily  tiotropium 2.5 MICROgram(s) Inhaler 2 Puff(s) Inhalation daily    MEDICATIONS  (PRN):  dextrose Oral Gel 15 Gram(s) Oral once PRN Blood Glucose LESS THAN 70 milliGRAM(s)/deciliter  lactulose Syrup 10 Gram(s) Oral daily PRN constipation  ondansetron    Tablet 4 milliGRAM(s) Oral every 6 hours PRN for nausea    ALLERGIES:  Allergies    aspirin (Short breath)  Dilaudid (Short breath)  tetanus toxoid (Short breath)  Avelox (Short breath; Pruritus)  Valium (Short breath)  codeine (Short breath)  shellfish (Anaphylaxis)  cefepime (Anaphylaxis)  penicillin (Anaphylaxis)  iodine (Short breath; Swelling)    Intolerances      LABS:                        10.2   10.19 )-----------( 458      ( 26 Dec 2023 04:40 )             34.8     12-26    136  |  97  |  12.8  ----------------------------<  371<H>  5.1   |  27.0  |  0.56    Ca    9.3      26 Dec 2023 04:40  Phos  3.1     12-26  Mg     2.1     12-26    TPro  6.4<L>  /  Alb  3.1<L>  /  TBili  0.2<L>  /  DBili  x   /  AST  12  /  ALT  9   /  AlkPhos  85  12-26      Urinalysis Basic - ( 26 Dec 2023 04:40 )    Color: x / Appearance: x / SG: x / pH: x  Gluc: 371 mg/dL / Ketone: x  / Bili: x / Urobili: x   Blood: x / Protein: x / Nitrite: x   Leuk Esterase: x / RBC: x / WBC x   Sq Epi: x / Non Sq Epi: x / Bacteria: x      Micro:    Culture - Blood (collected 12-24-23 @ 11:12)  Source: .Blood Blood-Peripheral  Preliminary Report (12-25-23 @ 18:02):    No growth at 24 hours    Culture - Blood (collected 12-24-23 @ 11:09)  Source: .Blood Blood-Peripheral  Preliminary Report (12-25-23 @ 18:02):    No growth at 24 hours    Culture - Blood (collected 12-21-23 @ 12:00)  Source: .Blood Blood-Peripheral  Gram Stain (12-24-23 @ 06:42):    Growth in anaerobic bottle: Gram Negative Rods  Preliminary Report (12-25-23 @ 11:13):    Growth in anaerobic bottle: Proteus mirabilis    Direct identification is available within approximately 3-5    hours either by Blood Panel Multiplexed PCR or Direct    MALDI-TOF. Details: [email or web address]  Organism: Blood Culture PCR (12-24-23 @ 07:37)  Organism: Blood Culture PCR (12-24-23 @ 07:37)      Method Type: PCR      -  Proteus species: Detec      -  ESBL: Detec      -  CTX-M Resistance Marker: Detec    Culture - Blood (collected 12-21-23 @ 11:50)  Source: .Blood Blood-Peripheral  Preliminary Report (12-25-23 @ 16:01):    No growth at 4 days          RADIOLOGY & ADDITIONAL TESTS: Reviewed. 73-year-old female patient with a history of A-fib, COPD, TAVR,  tracheomalacia, aortic dissection, ostomy secondary to colorectal cancer, adrenal insufficiency, on steroids, presents to the ED complaining of nausea, vomiting and SOB.    INTERVAL HPI/OVERNIGHT EVENTS:  - No acute events    SUBJECTIVE:   - Patient seen and examined at bedside.   - Reports sleeping well overnight  - Reports continued improvement of L heel pain    ROS: All negative except as listed above.    VITAL SIGNS:  ICU Vital Signs Last 24 Hrs  T(C): 36.4 (26 Dec 2023 05:46), Max: 37.2 (25 Dec 2023 10:30)  T(F): 97.5 (26 Dec 2023 05:46), Max: 99 (25 Dec 2023 20:00)  HR: 71 (26 Dec 2023 05:46) (71 - 81)  BP: 133/75 (26 Dec 2023 05:46) (125/76 - 148/89)  RR: 18 (26 Dec 2023 05:46) (18 - 18)  SpO2: 97% (26 Dec 2023 05:46) (92% - 97%)    O2 Parameters below as of 26 Dec 2023 05:46  Patient On (Oxygen Delivery Method): room air    Plateau pressure:   P/F ratio:     12-25 @ 07:01  -  12-26 @ 07:00  --------------------------------------------------------  IN: 0 mL / OUT: 1100 mL / NET: -1100 mL      CAPILLARY BLOOD GLUCOSE    POCT Blood Glucose.: 264 mg/dL (25 Dec 2023 21:30)    ECG: reviewed.    PHYSICAL EXAM:    GENERAL: NAD  HEAD: Swollen Eyelids, no JVD  EYES: EOMI, PERRLA, conjunctiva and sclera clear  NECK: Supple, trachea midline, no JVD  HEART: Irregular rate and rhythm, no murmurs,   LUNGS: CTAB  ABDOMEN: Soft, nontender, nondistended, colostomy in place  EXTREMITIES: No edema  NERVOUS SYSTEM:  A&Ox2, no focal deficits     MEDICATIONS:  MEDICATIONS  (STANDING):  albuterol/ipratropium for Nebulization 3 milliLiter(s) Nebulizer every 6 hours  apixaban 5 milliGRAM(s) Oral every 12 hours  atorvastatin 20 milliGRAM(s) Oral at bedtime  clopidogrel Tablet 75 milliGRAM(s) Oral daily  dextrose 5%. 1000 milliLiter(s) (100 mL/Hr) IV Continuous <Continuous>  dextrose 5%. 1000 milliLiter(s) (50 mL/Hr) IV Continuous <Continuous>  dextrose 50% Injectable 25 Gram(s) IV Push once  dextrose 50% Injectable 12.5 Gram(s) IV Push once  dextrose 50% Injectable 25 Gram(s) IV Push once  diphenhydrAMINE Injectable 50 milliGRAM(s) IV Push daily  ertapenem  IVPB 1000 milliGRAM(s) IV Intermittent every 24 hours  famotidine    Tablet 20 milliGRAM(s) Oral daily  gabapentin 100 milliGRAM(s) Oral every 8 hours  glucagon  Injectable 1 milliGRAM(s) IntraMuscular once  guaiFENesin ER 1200 milliGRAM(s) Oral every 12 hours  influenza  Vaccine (HIGH DOSE) 0.7 milliLiter(s) IntraMuscular once  insulin glargine Injectable (LANTUS) 16 Unit(s) SubCutaneous at bedtime  insulin lispro (ADMELOG) corrective regimen sliding scale   SubCutaneous three times a day before meals  insulin lispro Injectable (ADMELOG) 5 Unit(s) SubCutaneous three times a day before meals  levETIRAcetam 500 milliGRAM(s) Oral two times a day  methylPREDNISolone sodium succinate Injectable 40 milliGRAM(s) IV Push every 4 hours  mexiletine 200 milliGRAM(s) Oral every 8 hours  montelukast 10 milliGRAM(s) Oral daily  tiotropium 2.5 MICROgram(s) Inhaler 2 Puff(s) Inhalation daily    MEDICATIONS  (PRN):  dextrose Oral Gel 15 Gram(s) Oral once PRN Blood Glucose LESS THAN 70 milliGRAM(s)/deciliter  lactulose Syrup 10 Gram(s) Oral daily PRN constipation  ondansetron    Tablet 4 milliGRAM(s) Oral every 6 hours PRN for nausea    ALLERGIES:  Allergies    aspirin (Short breath)  Dilaudid (Short breath)  tetanus toxoid (Short breath)  Avelox (Short breath; Pruritus)  Valium (Short breath)  codeine (Short breath)  shellfish (Anaphylaxis)  cefepime (Anaphylaxis)  penicillin (Anaphylaxis)  iodine (Short breath; Swelling)    Intolerances      LABS:                        10.2   10.19 )-----------( 458      ( 26 Dec 2023 04:40 )             34.8     12-26    136  |  97  |  12.8  ----------------------------<  371<H>  5.1   |  27.0  |  0.56    Ca    9.3      26 Dec 2023 04:40  Phos  3.1     12-26  Mg     2.1     12-26    TPro  6.4<L>  /  Alb  3.1<L>  /  TBili  0.2<L>  /  DBili  x   /  AST  12  /  ALT  9   /  AlkPhos  85  12-26      Urinalysis Basic - ( 26 Dec 2023 04:40 )    Color: x / Appearance: x / SG: x / pH: x  Gluc: 371 mg/dL / Ketone: x  / Bili: x / Urobili: x   Blood: x / Protein: x / Nitrite: x   Leuk Esterase: x / RBC: x / WBC x   Sq Epi: x / Non Sq Epi: x / Bacteria: x      Micro:    Culture - Blood (collected 12-24-23 @ 11:12)  Source: .Blood Blood-Peripheral  Preliminary Report (12-25-23 @ 18:02):    No growth at 24 hours    Culture - Blood (collected 12-24-23 @ 11:09)  Source: .Blood Blood-Peripheral  Preliminary Report (12-25-23 @ 18:02):    No growth at 24 hours    Culture - Blood (collected 12-21-23 @ 12:00)  Source: .Blood Blood-Peripheral  Gram Stain (12-24-23 @ 06:42):    Growth in anaerobic bottle: Gram Negative Rods  Preliminary Report (12-25-23 @ 11:13):    Growth in anaerobic bottle: Proteus mirabilis    Direct identification is available within approximately 3-5    hours either by Blood Panel Multiplexed PCR or Direct    MALDI-TOF. Details: [email or web address]  Organism: Blood Culture PCR (12-24-23 @ 07:37)  Organism: Blood Culture PCR (12-24-23 @ 07:37)      Method Type: PCR      -  Proteus species: Detec      -  ESBL: Detec      -  CTX-M Resistance Marker: Detec    Culture - Blood (collected 12-21-23 @ 11:50)  Source: .Blood Blood-Peripheral  Preliminary Report (12-25-23 @ 16:01):    No growth at 4 days          RADIOLOGY & ADDITIONAL TESTS: Reviewed. 73-year-old female patient with a history of A-fib, COPD, TAVR,  tracheomalacia, aortic dissection, ostomy secondary to colorectal cancer, adrenal insufficiency, on steroids, presents to the ED complaining of nausea, vomiting and SOB.    INTERVAL HPI/OVERNIGHT EVENTS:  - No acute events    SUBJECTIVE:   - Patient seen and examined at bedside.   - Reports sleeping well overnight  - Reports continued improvement of L heel pain    ROS: All negative except as listed above.    VITAL SIGNS:  ICU Vital Signs Last 24 Hrs  T(C): 36.4 (26 Dec 2023 05:46), Max: 37.2 (25 Dec 2023 10:30)  T(F): 97.5 (26 Dec 2023 05:46), Max: 99 (25 Dec 2023 20:00)  HR: 71 (26 Dec 2023 05:46) (71 - 81)  BP: 133/75 (26 Dec 2023 05:46) (125/76 - 148/89)  RR: 18 (26 Dec 2023 05:46) (18 - 18)  SpO2: 97% (26 Dec 2023 05:46) (92% - 97%)    O2 Parameters below as of 26 Dec 2023 05:46  Patient On (Oxygen Delivery Method): room air    Plateau pressure:   P/F ratio:     12-25 @ 07:01  -  12-26 @ 07:00  --------------------------------------------------------  IN: 0 mL / OUT: 1100 mL / NET: -1100 mL    CAPILLARY BLOOD GLUCOSE    POCT Blood Glucose.: 264 mg/dL (25 Dec 2023 21:30)    ECG: reviewed.    PHYSICAL EXAM:    GENERAL: NAD  HEAD: Swollen Eyelids, no JVD  EYES: EOMI, PERRLA, conjunctiva and sclera clear  NECK: Supple, trachea midline, no JVD  HEART: Irregular rate and rhythm, no murmurs,   LUNGS: CTAB  ABDOMEN: Soft, nontender, nondistended, colostomy in place  EXTREMITIES: No edema  NERVOUS SYSTEM:  A&Ox2, no focal deficits     MEDICATIONS:  MEDICATIONS  (STANDING):  albuterol/ipratropium for Nebulization 3 milliLiter(s) Nebulizer every 6 hours  apixaban 5 milliGRAM(s) Oral every 12 hours  atorvastatin 20 milliGRAM(s) Oral at bedtime  clopidogrel Tablet 75 milliGRAM(s) Oral daily  dextrose 5%. 1000 milliLiter(s) (100 mL/Hr) IV Continuous <Continuous>  dextrose 5%. 1000 milliLiter(s) (50 mL/Hr) IV Continuous <Continuous>  dextrose 50% Injectable 25 Gram(s) IV Push once  dextrose 50% Injectable 12.5 Gram(s) IV Push once  dextrose 50% Injectable 25 Gram(s) IV Push once  diphenhydrAMINE Injectable 50 milliGRAM(s) IV Push daily  ertapenem  IVPB 1000 milliGRAM(s) IV Intermittent every 24 hours  famotidine    Tablet 20 milliGRAM(s) Oral daily  gabapentin 100 milliGRAM(s) Oral every 8 hours  glucagon  Injectable 1 milliGRAM(s) IntraMuscular once  guaiFENesin ER 1200 milliGRAM(s) Oral every 12 hours  influenza  Vaccine (HIGH DOSE) 0.7 milliLiter(s) IntraMuscular once  insulin glargine Injectable (LANTUS) 16 Unit(s) SubCutaneous at bedtime  insulin lispro (ADMELOG) corrective regimen sliding scale   SubCutaneous three times a day before meals  insulin lispro Injectable (ADMELOG) 5 Unit(s) SubCutaneous three times a day before meals  levETIRAcetam 500 milliGRAM(s) Oral two times a day  methylPREDNISolone sodium succinate Injectable 40 milliGRAM(s) IV Push every 4 hours  mexiletine 200 milliGRAM(s) Oral every 8 hours  montelukast 10 milliGRAM(s) Oral daily  tiotropium 2.5 MICROgram(s) Inhaler 2 Puff(s) Inhalation daily    MEDICATIONS  (PRN):  dextrose Oral Gel 15 Gram(s) Oral once PRN Blood Glucose LESS THAN 70 milliGRAM(s)/deciliter  lactulose Syrup 10 Gram(s) Oral daily PRN constipation  ondansetron    Tablet 4 milliGRAM(s) Oral every 6 hours PRN for nausea    ALLERGIES:  Allergies    aspirin (Short breath)  Dilaudid (Short breath)  tetanus toxoid (Short breath)  Avelox (Short breath; Pruritus)  Valium (Short breath)  codeine (Short breath)  shellfish (Anaphylaxis)  cefepime (Anaphylaxis)  penicillin (Anaphylaxis)  iodine (Short breath; Swelling)    Intolerances      LABS:                        10.2   10.19 )-----------( 458      ( 26 Dec 2023 04:40 )             34.8     12-26    136  |  97  |  12.8  ----------------------------<  371<H>  5.1   |  27.0  |  0.56    Ca    9.3      26 Dec 2023 04:40  Phos  3.1     12-26  Mg     2.1     12-26    TPro  6.4<L>  /  Alb  3.1<L>  /  TBili  0.2<L>  /  DBili  x   /  AST  12  /  ALT  9   /  AlkPhos  85  12-26      Urinalysis Basic - ( 26 Dec 2023 04:40 )    Color: x / Appearance: x / SG: x / pH: x  Gluc: 371 mg/dL / Ketone: x  / Bili: x / Urobili: x   Blood: x / Protein: x / Nitrite: x   Leuk Esterase: x / RBC: x / WBC x   Sq Epi: x / Non Sq Epi: x / Bacteria: x      Micro:    Culture - Blood (collected 12-24-23 @ 11:12)  Source: .Blood Blood-Peripheral  Preliminary Report (12-25-23 @ 18:02):    No growth at 24 hours    Culture - Blood (collected 12-24-23 @ 11:09)  Source: .Blood Blood-Peripheral  Preliminary Report (12-25-23 @ 18:02):    No growth at 24 hours    Culture - Blood (collected 12-21-23 @ 12:00)  Source: .Blood Blood-Peripheral  Gram Stain (12-24-23 @ 06:42):    Growth in anaerobic bottle: Gram Negative Rods  Preliminary Report (12-25-23 @ 11:13):    Growth in anaerobic bottle: Proteus mirabilis    Direct identification is available within approximately 3-5    hours either by Blood Panel Multiplexed PCR or Direct    MALDI-TOF. Details: [email or web address]  Organism: Blood Culture PCR (12-24-23 @ 07:37)  Organism: Blood Culture PCR (12-24-23 @ 07:37)      Method Type: PCR      -  Proteus species: Detec      -  ESBL: Detec      -  CTX-M Resistance Marker: Detec    Culture - Blood (collected 12-21-23 @ 11:50)  Source: .Blood Blood-Peripheral  Preliminary Report (12-25-23 @ 16:01):    No growth at 4 days          RADIOLOGY & ADDITIONAL TESTS: Reviewed. 73-year-old female patient with a history of A-fib, COPD, TAVR,  tracheomalacia, aortic dissection, ostomy secondary to colorectal cancer, adrenal insufficiency, on steroids, presents to the ED complaining of nausea, vomiting and SOB.    INTERVAL HPI/OVERNIGHT EVENTS:  - No acute events    SUBJECTIVE:   - Patient seen and examined at bedside.   - Reports sleeping well overnight  - Reports continued improvement of L heel pain    ROS: All negative except as listed above.    VITAL SIGNS:  ICU Vital Signs Last 24 Hrs  T(C): 36.4 (26 Dec 2023 05:46), Max: 37.2 (25 Dec 2023 10:30)  T(F): 97.5 (26 Dec 2023 05:46), Max: 99 (25 Dec 2023 20:00)  HR: 71 (26 Dec 2023 05:46) (71 - 81)  BP: 133/75 (26 Dec 2023 05:46) (125/76 - 148/89)  RR: 18 (26 Dec 2023 05:46) (18 - 18)  SpO2: 97% (26 Dec 2023 05:46) (92% - 97%)    O2 Parameters below as of 26 Dec 2023 05:46  Patient On (Oxygen Delivery Method): room air    Plateau pressure:   P/F ratio:     12-25 @ 07:01  -  12-26 @ 07:00  --------------------------------------------------------  IN: 0 mL / OUT: 1100 mL / NET: -1100 mL    CAPILLARY BLOOD GLUCOSE    POCT Blood Glucose.: 264 mg/dL (25 Dec 2023 21:30)    ECG: reviewed.    PHYSICAL EXAM:    GENERAL: NAD  HEAD: Swollen Eyelids, no JVD  EYES: EOMI, PERRLA, conjunctiva and sclera clear  NECK: Supple, trachea midline, no JVD  HEART: Irregular rate and rhythm, no murmurs,   LUNGS: CTAB  ABDOMEN: Soft, nontender, nondistended, colostomy in place  EXTREMITIES: No edema  NERVOUS SYSTEM:  A&Ox2, no focal deficits     MEDICATIONS:  MEDICATIONS  (STANDING):  albuterol/ipratropium for Nebulization 3 milliLiter(s) Nebulizer every 6 hours  apixaban 5 milliGRAM(s) Oral every 12 hours  atorvastatin 20 milliGRAM(s) Oral at bedtime  clopidogrel Tablet 75 milliGRAM(s) Oral daily  dextrose 5%. 1000 milliLiter(s) (100 mL/Hr) IV Continuous <Continuous>  dextrose 5%. 1000 milliLiter(s) (50 mL/Hr) IV Continuous <Continuous>  dextrose 50% Injectable 25 Gram(s) IV Push once  dextrose 50% Injectable 12.5 Gram(s) IV Push once  dextrose 50% Injectable 25 Gram(s) IV Push once  diphenhydrAMINE Injectable 50 milliGRAM(s) IV Push daily  ertapenem  IVPB 1000 milliGRAM(s) IV Intermittent every 24 hours  famotidine    Tablet 20 milliGRAM(s) Oral daily  gabapentin 100 milliGRAM(s) Oral every 8 hours  glucagon  Injectable 1 milliGRAM(s) IntraMuscular once  guaiFENesin ER 1200 milliGRAM(s) Oral every 12 hours  influenza  Vaccine (HIGH DOSE) 0.7 milliLiter(s) IntraMuscular once  insulin glargine Injectable (LANTUS) 16 Unit(s) SubCutaneous at bedtime  insulin lispro (ADMELOG) corrective regimen sliding scale   SubCutaneous three times a day before meals  insulin lispro Injectable (ADMELOG) 5 Unit(s) SubCutaneous three times a day before meals  levETIRAcetam 500 milliGRAM(s) Oral two times a day  methylPREDNISolone sodium succinate Injectable 40 milliGRAM(s) IV Push every 4 hours  mexiletine 200 milliGRAM(s) Oral every 8 hours  montelukast 10 milliGRAM(s) Oral daily  tiotropium 2.5 MICROgram(s) Inhaler 2 Puff(s) Inhalation daily    MEDICATIONS  (PRN):  dextrose Oral Gel 15 Gram(s) Oral once PRN Blood Glucose LESS THAN 70 milliGRAM(s)/deciliter  lactulose Syrup 10 Gram(s) Oral daily PRN constipation  ondansetron    Tablet 4 milliGRAM(s) Oral every 6 hours PRN for nausea    ALLERGIES:  Allergies    aspirin (Short breath)  Dilaudid (Short breath)  tetanus toxoid (Short breath)  Avelox (Short breath; Pruritus)  Valium (Short breath)  codeine (Short breath)  shellfish (Anaphylaxis)  cefepime (Anaphylaxis)  penicillin (Anaphylaxis)  iodine (Short breath; Swelling)    Intolerances      LABS:                        10.2   10.19 )-----------( 458      ( 26 Dec 2023 04:40 )             34.8     12-26    136  |  97  |  12.8  ----------------------------<  371<H>  5.1   |  27.0  |  0.56    Ca    9.3      26 Dec 2023 04:40  Phos  3.1     12-26  Mg     2.1     12-26    TPro  6.4<L>  /  Alb  3.1<L>  /  TBili  0.2<L>  /  DBili  x   /  AST  12  /  ALT  9   /  AlkPhos  85  12-26      Urinalysis Basic - ( 26 Dec 2023 04:40 )    Color: x / Appearance: x / SG: x / pH: x  Gluc: 371 mg/dL / Ketone: x  / Bili: x / Urobili: x   Blood: x / Protein: x / Nitrite: x   Leuk Esterase: x / RBC: x / WBC x   Sq Epi: x / Non Sq Epi: x / Bacteria: x      Micro:    Culture - Blood (collected 12-24-23 @ 11:12)  Source: .Blood Blood-Peripheral  Preliminary Report (12-25-23 @ 18:02):    No growth at 24 hours    Culture - Blood (collected 12-24-23 @ 11:09)  Source: .Blood Blood-Peripheral  Preliminary Report (12-25-23 @ 18:02):    No growth at 24 hours    Culture - Blood (collected 12-21-23 @ 12:00)  Source: .Blood Blood-Peripheral  Gram Stain (12-24-23 @ 06:42):    Growth in anaerobic bottle: Gram Negative Rods  Preliminary Report (12-25-23 @ 11:13):    Growth in anaerobic bottle: Proteus mirabilis    Direct identification is available within approximately 3-5    hours either by Blood Panel Multiplexed PCR or Direct    MALDI-TOF. Details: [email or web address]  Organism: Blood Culture PCR (12-24-23 @ 07:37)  Organism: Blood Culture PCR (12-24-23 @ 07:37)      Method Type: PCR      -  Proteus species: Detec      -  ESBL: Detec      -  CTX-M Resistance Marker: Detec    Culture - Blood (collected 12-21-23 @ 11:50)  Source: .Blood Blood-Peripheral  Preliminary Report (12-25-23 @ 16:01):    No growth at 4 days          RADIOLOGY & ADDITIONAL TESTS: Reviewed.

## 2023-12-26 NOTE — PROGRESS NOTE ADULT - SUBJECTIVE AND OBJECTIVE BOX
[facility]  INFECTIOUS DISEASES at [place] / [place] / [place]  =======================================================                               Allen Ibanez MD#   Iam Benjamin MD*                             Opal Da Silva MD*   Michelle Dowell MD*                              Professor Emeritus:  Dr Choco Hewitt MD^            Diplomates American Board of Internal Medicine & Infectious Diseases                # [place] Office - Appt - Tel  [phone] Fax [phone]                * [place] Office - Appt - Tel [phone] Fax [phone]                      ^[place] Office - Tel  [phone] Fax [phone]                                  Hospital Consult line:  [phone]  =======================================================    RAYRAY RODRIGUEZ [id number]    Follow up: aspiration PNA    No fevers since 12/21    c/o left foot heel pain       Allergies:  aspirin (Short breath)  Dilaudid (Short breath)  tetanus toxoid (Short breath)  Avelox (Short breath; Pruritus)  Valium (Short breath)  codeine (Short breath)  shellfish (Anaphylaxis)  cefepime (Anaphylaxis)  penicillin (Anaphylaxis)  iodine (Short breath; Swelling)      REVIEW OF SYSTEMS:  no complaints     Physical Exam:  GEN: NAD  HEENT: normocephalic and atraumatic.   NECK: Supple.   LUNGS: CTA B/L   HEART: RRR  ABDOMEN: Soft, NT, ND.  +BS.    : No CVA tenderness  EXTREMITIES: Without  edema.  MSK: No joint swelling  NEUROLOGIC: More awake and answering questions  SKIN: Left foot heel improved         Vitals:  T(F): 97.4 (26 Dec 2023 08:40), Max: 99 (25 Dec 2023 20:00)  HR: 78 (26 Dec 2023 08:40)  BP: 137/75 (26 Dec 2023 08:40)  RR: 18 (26 Dec 2023 08:40)  SpO2: 98% (26 Dec 2023 08:40) (93% - 98%)  temp max in last 48H T(F): , Max: 99 (12-25-23 @ 20:00)    Current Antibiotics:  ertapenem  IVPB 1000 milliGRAM(s) IV Intermittent every 24 hours    Other medications:  albuterol/ipratropium for Nebulization 3 milliLiter(s) Nebulizer every 6 hours  apixaban 5 milliGRAM(s) Oral every 12 hours  atorvastatin 20 milliGRAM(s) Oral at bedtime  clopidogrel Tablet 75 milliGRAM(s) Oral daily  dextrose 5%. 1000 milliLiter(s) IV Continuous <Continuous>  dextrose 5%. 1000 milliLiter(s) IV Continuous <Continuous>  dextrose 50% Injectable 25 Gram(s) IV Push once  dextrose 50% Injectable 12.5 Gram(s) IV Push once  dextrose 50% Injectable 25 Gram(s) IV Push once  diphenhydrAMINE Injectable 50 milliGRAM(s) IV Push daily  famotidine    Tablet 20 milliGRAM(s) Oral daily  ferrous    sulfate 325 milliGRAM(s) Oral daily  gabapentin 100 milliGRAM(s) Oral every 8 hours  glucagon  Injectable 1 milliGRAM(s) IntraMuscular once  guaiFENesin ER 1200 milliGRAM(s) Oral every 12 hours  influenza  Vaccine (HIGH DOSE) 0.7 milliLiter(s) IntraMuscular once  insulin glargine Injectable (LANTUS) 16 Unit(s) SubCutaneous at bedtime  insulin lispro (ADMELOG) corrective regimen sliding scale   SubCutaneous three times a day before meals  insulin lispro Injectable (ADMELOG) 5 Unit(s) SubCutaneous three times a day before meals  insulin NPH human recombinant 7 Unit(s) SubCutaneous once  levETIRAcetam 500 milliGRAM(s) Oral two times a day  methylPREDNISolone 8 milliGRAM(s) Oral daily  mexiletine 200 milliGRAM(s) Oral every 8 hours  montelukast 10 milliGRAM(s) Oral daily  senna 2 Tablet(s) Oral at bedtime  tiotropium 2.5 MICROgram(s) Inhaler 2 Puff(s) Inhalation daily                            10.2   10.19 )-----------( 458      ( 26 Dec 2023 04:40 )             34.8     12-26    136  |  97  |  12.8  ----------------------------<  371<H>  5.1   |  27.0  |  0.56    Ca    9.3      26 Dec 2023 04:40  Phos  3.1     12-26  Mg     2.1     12-26    TPro  6.4<L>  /  Alb  3.1<L>  /  TBili  0.2<L>  /  DBili  x   /  AST  12  /  ALT  9   /  AlkPhos  85  12-26    RECENT CULTURES:  12-24 @ 11:12 .Blood Blood-Peripheral     No growth at 24 hours    12-24 @ 11:09 .Blood Blood-Peripheral     No growth at 24 hours    12-21 @ 17:07    RVP  NotDete    12-21 @ 12:00 .Blood Blood-Peripheral Blood Culture PCR  Proteus mirabilis ESBL    Growth in anaerobic bottle: Proteus mirabilis ESBL  Direct identification is available within approximately 3-5  hours either by Blood Panel Multiplexed PCR or Direct  MALDI-TOF. Details: [email or web address]  Growth in anaerobic bottle: Gram Negative Rods    12-21 @ 11:50 .Blood Blood-Peripheral     No growth at 4 days        WBC Count: 10.19 K/uL (12-26-23 @ 04:40)  WBC Count: 12.30 K/uL (12-25-23 @ 08:08)  WBC Count: 10.10 K/uL (12-24-23 @ 07:57)  WBC Count: 10.08 K/uL (12-23-23 @ 04:44)  WBC Count: 24.77 K/uL (12-21-23 @ 12:00)    Creatinine: 0.56 mg/dL (12-26-23 @ 04:40)  Creatinine: 0.52 mg/dL (12-25-23 @ 08:08)  Creatinine: 0.55 mg/dL (12-24-23 @ 07:57)  Creatinine: 0.54 mg/dL (12-23-23 @ 04:44)  Creatinine: 0.94 mg/dL (12-21-23 @ 12:00)    Procalcitonin, Serum: 0.52 ng/mL (12-23-23 @ 04:44)     SARS-CoV-2: NotDetec (12-21-23 @ 17:07)  SARS-CoV-2 Result: NotDetec (12-21-23 @ 12:00)            < from: CT Chest No Cont (12.21.23 @ 14:35) >  ACC: [id number] EXAM:  CT CHEST   ORDERED BY: WILLIAM WIEDEMANN     ACC: [id number] EXAM:  CT ABDOMEN AND PELVIS   ORDERED BY: MICHAEL COX     PROCEDURE DATE:  12/21/2023      INTERPRETATION:  CT CHEST, ABDOMEN AND PELVIS WITHOUT CONTRAST    Clinical Indication: Pneumonia.  Vomiting and fevers with abd pain.   Patient has colostomy bag with no output for 24 hours, evaluate for   obstruction.  History of anal squamous cell carcinoma.    Technique: Axial CT images of the chest, abdomen and pelvis were obtained   from the lung apices to the pubic symphysis without oral or IV contrast.    Coronal and sagittal reformatted images were created and reviewed.    Comparison:  Prior CT of the chest from 10/28/2023, CT of the chest from   9/28/2023,CT abdomen and pelvis from 8/23/2023.  CT of the chest,   abdomen and pelvis from 9/1/2023, 8/1/2023.  CT of the abdomen and pelvis   from 9/18/2017.  CT of the chest from 9/22/2016.    Findings:  CHEST:    LUNGS AND LARGE AIRWAYS:   The tracheobronchial tree is patent. Bilateral   patchy airspace opacities are noted, right lung worse than left, and most   prominently seen in the right middle lobe; findings are significantly   worsened compared to the prior study from 10/28/2023. Trace secretions  are seen posteriorly in the trachea. Mild right apical paraseptal   emphysematous changes.    PLEURA: No pleural effusion. No pneumothorax.    VESSELS: Status post ascending aortic repair and aortic valve replacement   with redemonstrated valve-in-valve TAVR. Known residual dissection   involving the thoracoabdominal aorta is better seen on recent   contrast-enhanced CT from August and September, 2023. The pulmonary   artery is mildly enlarged, correlate for pulmonary artery hypertension.    HEART: Heart size is normal. No pericardial effusion.  Coronary artery   calcifications.    MEDIASTINUM AND MARANDA: There is no mediastinal lymphadenopathy.    Evaluation for hilar lymphadenopathy is limited without IV contrast,   however there is no gross hilar lymphadenopathy.    CHEST WALL AND LOWER NECK: There is no axillary lymphadenopathy.    ABDOMEN/PELVIS:  LIVER: Unenhanced liver appears within normal limits.  BILE DUCTS: Normal caliber.  GALLBLADDER: Within normal limits.  SPLEEN: Unenhancedspleen appears within normal limits.  PANCREAS: Unenhanced pancreas again appears atrophic.  Remonstrated   pancreatic hypodensities, measuring up to 2.4 cm at the pancreatic tail,   stable from multiple prior studies.  ADRENALS: Unenhanced adrenals appear within normal limits.  KIDNEYS/URETERS: No hydroureteronephrosis.  Known, partially calcified   left renal cysts appear grossly patent.    BLADDER: Appears grossly within normal limits, partially obscured by   metallic streak artifact from orthopedic hardware in the pubic symphysis    REPRODUCTIVE ORGANS: Hysterectomy.    BOWEL:  Status post abdominoperineal resection with left-sided colostomy.   Moderate to large amount of stool throughout the colon extending to the   ostomy, correlate forconstipation. No evidence for bowel obstruction or   inflammation.    Appendix is normal.    PERITONEUM: No ascites. Postsurgical presacral changes suboptimally   assessed however without significant change from prior studies.  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  Scattered   atherosclerotic calcifications of the aortoiliac tree and proximal thigh   vasculature.    LYMPH NODES: No lymphadenopathy.  ABDOMINAL WALL: Postsurgical changes.  BONES: No suspicious osseous lesion. Several densely sclerotic foci   throughout the vertebral bodies and ribs are stable from multiple prior   studiesand probably represent bone islands. Median sternotomy wires are   midline appear intact. Status post plate-screw fixation of pubic   symphysis fracture.  Redemonstrated horizontally oriented orthopedic   screw traversing the right ilium and sacroiliac joint  Mild lumbar   scoliosis, convex to the left with the apex at L4-L5.  Mild spinal canal   stenosis at L3-L4 and L4-L5 with posterior disc bulges.   Avascular   necrosis in both femoral heads again seen.    OTHER:  None    IMPRESSION:  Multifocal pneumonia, as detailed above; findings are progressed since   the prior CT of the chest from 10/28/2023. Recommend clinical correlation   and follow-up imaging to document resolution.    Status post abdominoperineal resection with left-sided colostomy.   Moderate to large amount of stool throughout the colon extending to the   ostomy, correlate for constipation. No evidence for bowel obstruction or   inflammation.    Appendix is normal.    Status post ascending aortic repair and aortic valve replacement.    Additional findings, as above.    --- End of Report ---    < end of copied text >               [facility]  INFECTIOUS DISEASES at [place] / [place] / [place]  =======================================================                               Allen Ibanez MD#   Iam Benjamin MD*                             Opal Da Silva MD*   Michelle Dowell MD*                              Professor Emeritus:  Dr Choco Hewitt MD^            Diplomates American Board of Internal Medicine & Infectious Diseases                # [place] Office - Appt - Tel  [phone] Fax [phone]                * [place] Office - Appt - Tel [phone] Fax [phone]                      ^[place] Office - Tel  [phone] Fax [phone]                                  Hospital Consult line:  [phone]  =======================================================    RAYRAY RODRIGUEZ [id number]    Follow up: aspiration PNA    No fevers since 12/21    c/o left foot heel pain       Allergies:  aspirin (Short breath)  Dilaudid (Short breath)  tetanus toxoid (Short breath)  Avelox (Short breath; Pruritus)  Valium (Short breath)  codeine (Short breath)  shellfish (Anaphylaxis)  cefepime (Anaphylaxis)  penicillin (Anaphylaxis)  iodine (Short breath; Swelling)      REVIEW OF SYSTEMS:  no complaints     Physical Exam:  GEN: NAD  HEENT: normocephalic and atraumatic.   NECK: Supple.   LUNGS: CTA B/L   HEART: RRR  ABDOMEN: Soft, NT, ND.  +BS.    : No CVA tenderness  EXTREMITIES: Without  edema.  MSK: No joint swelling  NEUROLOGIC: More awake and answering questions  SKIN: Left foot heel improved         Vitals:  T(F): 97.4 (26 Dec 2023 08:40), Max: 99 (25 Dec 2023 20:00)  HR: 78 (26 Dec 2023 08:40)  BP: 137/75 (26 Dec 2023 08:40)  RR: 18 (26 Dec 2023 08:40)  SpO2: 98% (26 Dec 2023 08:40) (93% - 98%)  temp max in last 48H T(F): , Max: 99 (12-25-23 @ 20:00)    Current Antibiotics:  ertapenem  IVPB 1000 milliGRAM(s) IV Intermittent every 24 hours    Other medications:  albuterol/ipratropium for Nebulization 3 milliLiter(s) Nebulizer every 6 hours  apixaban 5 milliGRAM(s) Oral every 12 hours  atorvastatin 20 milliGRAM(s) Oral at bedtime  clopidogrel Tablet 75 milliGRAM(s) Oral daily  dextrose 5%. 1000 milliLiter(s) IV Continuous <Continuous>  dextrose 5%. 1000 milliLiter(s) IV Continuous <Continuous>  dextrose 50% Injectable 25 Gram(s) IV Push once  dextrose 50% Injectable 12.5 Gram(s) IV Push once  dextrose 50% Injectable 25 Gram(s) IV Push once  diphenhydrAMINE Injectable 50 milliGRAM(s) IV Push daily  famotidine    Tablet 20 milliGRAM(s) Oral daily  ferrous    sulfate 325 milliGRAM(s) Oral daily  gabapentin 100 milliGRAM(s) Oral every 8 hours  glucagon  Injectable 1 milliGRAM(s) IntraMuscular once  guaiFENesin ER 1200 milliGRAM(s) Oral every 12 hours  influenza  Vaccine (HIGH DOSE) 0.7 milliLiter(s) IntraMuscular once  insulin glargine Injectable (LANTUS) 16 Unit(s) SubCutaneous at bedtime  insulin lispro (ADMELOG) corrective regimen sliding scale   SubCutaneous three times a day before meals  insulin lispro Injectable (ADMELOG) 5 Unit(s) SubCutaneous three times a day before meals  insulin NPH human recombinant 7 Unit(s) SubCutaneous once  levETIRAcetam 500 milliGRAM(s) Oral two times a day  methylPREDNISolone 8 milliGRAM(s) Oral daily  mexiletine 200 milliGRAM(s) Oral every 8 hours  montelukast 10 milliGRAM(s) Oral daily  senna 2 Tablet(s) Oral at bedtime  tiotropium 2.5 MICROgram(s) Inhaler 2 Puff(s) Inhalation daily                            10.2   10.19 )-----------( 458      ( 26 Dec 2023 04:40 )             34.8     12-26    136  |  97  |  12.8  ----------------------------<  371<H>  5.1   |  27.0  |  0.56    Ca    9.3      26 Dec 2023 04:40  Phos  3.1     12-26  Mg     2.1     12-26    TPro  6.4<L>  /  Alb  3.1<L>  /  TBili  0.2<L>  /  DBili  x   /  AST  12  /  ALT  9   /  AlkPhos  85  12-26    RECENT CULTURES:  12-24 @ 11:12 .Blood Blood-Peripheral     No growth at 24 hours    12-24 @ 11:09 .Blood Blood-Peripheral     No growth at 24 hours    12-21 @ 17:07    RVP  NotDete    12-21 @ 12:00 .Blood Blood-Peripheral Blood Culture PCR  Proteus mirabilis ESBL    Growth in anaerobic bottle: Proteus mirabilis ESBL  Direct identification is available within approximately 3-5  hours either by Blood Panel Multiplexed PCR or Direct  MALDI-TOF. Details: [email or web address]  Growth in anaerobic bottle: Gram Negative Rods    12-21 @ 11:50 .Blood Blood-Peripheral     No growth at 4 days        WBC Count: 10.19 K/uL (12-26-23 @ 04:40)  WBC Count: 12.30 K/uL (12-25-23 @ 08:08)  WBC Count: 10.10 K/uL (12-24-23 @ 07:57)  WBC Count: 10.08 K/uL (12-23-23 @ 04:44)  WBC Count: 24.77 K/uL (12-21-23 @ 12:00)    Creatinine: 0.56 mg/dL (12-26-23 @ 04:40)  Creatinine: 0.52 mg/dL (12-25-23 @ 08:08)  Creatinine: 0.55 mg/dL (12-24-23 @ 07:57)  Creatinine: 0.54 mg/dL (12-23-23 @ 04:44)  Creatinine: 0.94 mg/dL (12-21-23 @ 12:00)    Procalcitonin, Serum: 0.52 ng/mL (12-23-23 @ 04:44)     SARS-CoV-2: NotDetec (12-21-23 @ 17:07)  SARS-CoV-2 Result: NotDetec (12-21-23 @ 12:00)            < from: CT Chest No Cont (12.21.23 @ 14:35) >  ACC: [id number] EXAM:  CT CHEST   ORDERED BY: WILLIAM WIEDEMANN     ACC: [id number] EXAM:  CT ABDOMEN AND PELVIS   ORDERED BY: MICHAEL COX     PROCEDURE DATE:  12/21/2023      INTERPRETATION:  CT CHEST, ABDOMEN AND PELVIS WITHOUT CONTRAST    Clinical Indication: Pneumonia.  Vomiting and fevers with abd pain.   Patient has colostomy bag with no output for 24 hours, evaluate for   obstruction.  History of anal squamous cell carcinoma.    Technique: Axial CT images of the chest, abdomen and pelvis were obtained   from the lung apices to the pubic symphysis without oral or IV contrast.    Coronal and sagittal reformatted images were created and reviewed.    Comparison:  Prior CT of the chest from 10/28/2023, CT of the chest from   9/28/2023,CT abdomen and pelvis from 8/23/2023.  CT of the chest,   abdomen and pelvis from 9/1/2023, 8/1/2023.  CT of the abdomen and pelvis   from 9/18/2017.  CT of the chest from 9/22/2016.    Findings:  CHEST:    LUNGS AND LARGE AIRWAYS:   The tracheobronchial tree is patent. Bilateral   patchy airspace opacities are noted, right lung worse than left, and most   prominently seen in the right middle lobe; findings are significantly   worsened compared to the prior study from 10/28/2023. Trace secretions  are seen posteriorly in the trachea. Mild right apical paraseptal   emphysematous changes.    PLEURA: No pleural effusion. No pneumothorax.    VESSELS: Status post ascending aortic repair and aortic valve replacement   with redemonstrated valve-in-valve TAVR. Known residual dissection   involving the thoracoabdominal aorta is better seen on recent   contrast-enhanced CT from August and September, 2023. The pulmonary   artery is mildly enlarged, correlate for pulmonary artery hypertension.    HEART: Heart size is normal. No pericardial effusion.  Coronary artery   calcifications.    MEDIASTINUM AND MARANDA: There is no mediastinal lymphadenopathy.    Evaluation for hilar lymphadenopathy is limited without IV contrast,   however there is no gross hilar lymphadenopathy.    CHEST WALL AND LOWER NECK: There is no axillary lymphadenopathy.    ABDOMEN/PELVIS:  LIVER: Unenhanced liver appears within normal limits.  BILE DUCTS: Normal caliber.  GALLBLADDER: Within normal limits.  SPLEEN: Unenhancedspleen appears within normal limits.  PANCREAS: Unenhanced pancreas again appears atrophic.  Remonstrated   pancreatic hypodensities, measuring up to 2.4 cm at the pancreatic tail,   stable from multiple prior studies.  ADRENALS: Unenhanced adrenals appear within normal limits.  KIDNEYS/URETERS: No hydroureteronephrosis.  Known, partially calcified   left renal cysts appear grossly patent.    BLADDER: Appears grossly within normal limits, partially obscured by   metallic streak artifact from orthopedic hardware in the pubic symphysis    REPRODUCTIVE ORGANS: Hysterectomy.    BOWEL:  Status post abdominoperineal resection with left-sided colostomy.   Moderate to large amount of stool throughout the colon extending to the   ostomy, correlate forconstipation. No evidence for bowel obstruction or   inflammation.    Appendix is normal.    PERITONEUM: No ascites. Postsurgical presacral changes suboptimally   assessed however without significant change from prior studies.  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  Scattered   atherosclerotic calcifications of the aortoiliac tree and proximal thigh   vasculature.    LYMPH NODES: No lymphadenopathy.  ABDOMINAL WALL: Postsurgical changes.  BONES: No suspicious osseous lesion. Several densely sclerotic foci   throughout the vertebral bodies and ribs are stable from multiple prior   studiesand probably represent bone islands. Median sternotomy wires are   midline appear intact. Status post plate-screw fixation of pubic   symphysis fracture.  Redemonstrated horizontally oriented orthopedic   screw traversing the right ilium and sacroiliac joint  Mild lumbar   scoliosis, convex to the left with the apex at L4-L5.  Mild spinal canal   stenosis at L3-L4 and L4-L5 with posterior disc bulges.   Avascular   necrosis in both femoral heads again seen.    OTHER:  None    IMPRESSION:  Multifocal pneumonia, as detailed above; findings are progressed since   the prior CT of the chest from 10/28/2023. Recommend clinical correlation   and follow-up imaging to document resolution.    Status post abdominoperineal resection with left-sided colostomy.   Moderate to large amount of stool throughout the colon extending to the   ostomy, correlate for constipation. No evidence for bowel obstruction or   inflammation.    Appendix is normal.    Status post ascending aortic repair and aortic valve replacement.    Additional findings, as above.    --- End of Report ---    < end of copied text >

## 2023-12-27 PROBLEM — R05.8 PRODUCTIVE COUGH: Status: ACTIVE | Noted: 2020-06-02

## 2023-12-27 PROBLEM — R05.9 COUGH: Status: ACTIVE | Noted: 2021-11-04

## 2023-12-27 LAB
ALBUMIN SERPL ELPH-MCNC: 2.6 G/DL — LOW (ref 3.3–5.2)
ALBUMIN SERPL ELPH-MCNC: 2.6 G/DL — LOW (ref 3.3–5.2)
ALP SERPL-CCNC: 76 U/L — SIGNIFICANT CHANGE UP (ref 40–120)
ALP SERPL-CCNC: 76 U/L — SIGNIFICANT CHANGE UP (ref 40–120)
ALT FLD-CCNC: 10 U/L — SIGNIFICANT CHANGE UP
ALT FLD-CCNC: 10 U/L — SIGNIFICANT CHANGE UP
ANION GAP SERPL CALC-SCNC: 11 MMOL/L — SIGNIFICANT CHANGE UP (ref 5–17)
ANION GAP SERPL CALC-SCNC: 11 MMOL/L — SIGNIFICANT CHANGE UP (ref 5–17)
AST SERPL-CCNC: 14 U/L — SIGNIFICANT CHANGE UP
AST SERPL-CCNC: 14 U/L — SIGNIFICANT CHANGE UP
BASOPHILS # BLD AUTO: 0.03 K/UL — SIGNIFICANT CHANGE UP (ref 0–0.2)
BASOPHILS # BLD AUTO: 0.03 K/UL — SIGNIFICANT CHANGE UP (ref 0–0.2)
BASOPHILS NFR BLD AUTO: 0.3 % — SIGNIFICANT CHANGE UP (ref 0–2)
BASOPHILS NFR BLD AUTO: 0.3 % — SIGNIFICANT CHANGE UP (ref 0–2)
BILIRUB SERPL-MCNC: <0.2 MG/DL — LOW (ref 0.4–2)
BILIRUB SERPL-MCNC: <0.2 MG/DL — LOW (ref 0.4–2)
BUN SERPL-MCNC: 18.6 MG/DL — SIGNIFICANT CHANGE UP (ref 8–20)
BUN SERPL-MCNC: 18.6 MG/DL — SIGNIFICANT CHANGE UP (ref 8–20)
CALCIUM SERPL-MCNC: 8.7 MG/DL — SIGNIFICANT CHANGE UP (ref 8.4–10.5)
CALCIUM SERPL-MCNC: 8.7 MG/DL — SIGNIFICANT CHANGE UP (ref 8.4–10.5)
CHLORIDE SERPL-SCNC: 101 MMOL/L — SIGNIFICANT CHANGE UP (ref 96–108)
CHLORIDE SERPL-SCNC: 101 MMOL/L — SIGNIFICANT CHANGE UP (ref 96–108)
CO2 SERPL-SCNC: 26 MMOL/L — SIGNIFICANT CHANGE UP (ref 22–29)
CO2 SERPL-SCNC: 26 MMOL/L — SIGNIFICANT CHANGE UP (ref 22–29)
CREAT SERPL-MCNC: 0.58 MG/DL — SIGNIFICANT CHANGE UP (ref 0.5–1.3)
CREAT SERPL-MCNC: 0.58 MG/DL — SIGNIFICANT CHANGE UP (ref 0.5–1.3)
EGFR: 94 ML/MIN/1.73M2 — SIGNIFICANT CHANGE UP
EGFR: 94 ML/MIN/1.73M2 — SIGNIFICANT CHANGE UP
EOSINOPHIL # BLD AUTO: 0.1 K/UL — SIGNIFICANT CHANGE UP (ref 0–0.5)
EOSINOPHIL # BLD AUTO: 0.1 K/UL — SIGNIFICANT CHANGE UP (ref 0–0.5)
EOSINOPHIL NFR BLD AUTO: 0.9 % — SIGNIFICANT CHANGE UP (ref 0–6)
EOSINOPHIL NFR BLD AUTO: 0.9 % — SIGNIFICANT CHANGE UP (ref 0–6)
GLUCOSE BLDC GLUCOMTR-MCNC: 214 MG/DL — HIGH (ref 70–99)
GLUCOSE BLDC GLUCOMTR-MCNC: 214 MG/DL — HIGH (ref 70–99)
GLUCOSE BLDC GLUCOMTR-MCNC: 227 MG/DL — HIGH (ref 70–99)
GLUCOSE BLDC GLUCOMTR-MCNC: 227 MG/DL — HIGH (ref 70–99)
GLUCOSE BLDC GLUCOMTR-MCNC: 234 MG/DL — HIGH (ref 70–99)
GLUCOSE BLDC GLUCOMTR-MCNC: 234 MG/DL — HIGH (ref 70–99)
GLUCOSE BLDC GLUCOMTR-MCNC: 280 MG/DL — HIGH (ref 70–99)
GLUCOSE BLDC GLUCOMTR-MCNC: 280 MG/DL — HIGH (ref 70–99)
GLUCOSE SERPL-MCNC: 226 MG/DL — HIGH (ref 70–99)
GLUCOSE SERPL-MCNC: 226 MG/DL — HIGH (ref 70–99)
HCT VFR BLD CALC: 35.3 % — SIGNIFICANT CHANGE UP (ref 34.5–45)
HCT VFR BLD CALC: 35.3 % — SIGNIFICANT CHANGE UP (ref 34.5–45)
HGB BLD-MCNC: 9.8 G/DL — LOW (ref 11.5–15.5)
HGB BLD-MCNC: 9.8 G/DL — LOW (ref 11.5–15.5)
IMM GRANULOCYTES NFR BLD AUTO: 1.7 % — HIGH (ref 0–0.9)
IMM GRANULOCYTES NFR BLD AUTO: 1.7 % — HIGH (ref 0–0.9)
LYMPHOCYTES # BLD AUTO: 1.71 K/UL — SIGNIFICANT CHANGE UP (ref 1–3.3)
LYMPHOCYTES # BLD AUTO: 1.71 K/UL — SIGNIFICANT CHANGE UP (ref 1–3.3)
LYMPHOCYTES # BLD AUTO: 15.1 % — SIGNIFICANT CHANGE UP (ref 13–44)
LYMPHOCYTES # BLD AUTO: 15.1 % — SIGNIFICANT CHANGE UP (ref 13–44)
MAGNESIUM SERPL-MCNC: 1.9 MG/DL — SIGNIFICANT CHANGE UP (ref 1.6–2.6)
MAGNESIUM SERPL-MCNC: 1.9 MG/DL — SIGNIFICANT CHANGE UP (ref 1.6–2.6)
MCHC RBC-ENTMCNC: 18.1 PG — LOW (ref 27–34)
MCHC RBC-ENTMCNC: 18.1 PG — LOW (ref 27–34)
MCHC RBC-ENTMCNC: 27.8 GM/DL — LOW (ref 32–36)
MCHC RBC-ENTMCNC: 27.8 GM/DL — LOW (ref 32–36)
MCV RBC AUTO: 65.4 FL — LOW (ref 80–100)
MCV RBC AUTO: 65.4 FL — LOW (ref 80–100)
MONOCYTES # BLD AUTO: 1.34 K/UL — HIGH (ref 0–0.9)
MONOCYTES # BLD AUTO: 1.34 K/UL — HIGH (ref 0–0.9)
MONOCYTES NFR BLD AUTO: 11.8 % — SIGNIFICANT CHANGE UP (ref 2–14)
MONOCYTES NFR BLD AUTO: 11.8 % — SIGNIFICANT CHANGE UP (ref 2–14)
NEUTROPHILS # BLD AUTO: 7.99 K/UL — HIGH (ref 1.8–7.4)
NEUTROPHILS # BLD AUTO: 7.99 K/UL — HIGH (ref 1.8–7.4)
NEUTROPHILS NFR BLD AUTO: 70.2 % — SIGNIFICANT CHANGE UP (ref 43–77)
NEUTROPHILS NFR BLD AUTO: 70.2 % — SIGNIFICANT CHANGE UP (ref 43–77)
PHOSPHATE SERPL-MCNC: 3.2 MG/DL — SIGNIFICANT CHANGE UP (ref 2.4–4.7)
PHOSPHATE SERPL-MCNC: 3.2 MG/DL — SIGNIFICANT CHANGE UP (ref 2.4–4.7)
PLATELET # BLD AUTO: 495 K/UL — HIGH (ref 150–400)
PLATELET # BLD AUTO: 495 K/UL — HIGH (ref 150–400)
POTASSIUM SERPL-MCNC: 4.5 MMOL/L — SIGNIFICANT CHANGE UP (ref 3.5–5.3)
POTASSIUM SERPL-MCNC: 4.5 MMOL/L — SIGNIFICANT CHANGE UP (ref 3.5–5.3)
POTASSIUM SERPL-SCNC: 4.5 MMOL/L — SIGNIFICANT CHANGE UP (ref 3.5–5.3)
POTASSIUM SERPL-SCNC: 4.5 MMOL/L — SIGNIFICANT CHANGE UP (ref 3.5–5.3)
PROT SERPL-MCNC: 6.1 G/DL — LOW (ref 6.6–8.7)
PROT SERPL-MCNC: 6.1 G/DL — LOW (ref 6.6–8.7)
RBC # BLD: 5.4 M/UL — HIGH (ref 3.8–5.2)
RBC # BLD: 5.4 M/UL — HIGH (ref 3.8–5.2)
RBC # FLD: 22.5 % — HIGH (ref 10.3–14.5)
RBC # FLD: 22.5 % — HIGH (ref 10.3–14.5)
SODIUM SERPL-SCNC: 138 MMOL/L — SIGNIFICANT CHANGE UP (ref 135–145)
SODIUM SERPL-SCNC: 138 MMOL/L — SIGNIFICANT CHANGE UP (ref 135–145)
WBC # BLD: 11.36 K/UL — HIGH (ref 3.8–10.5)
WBC # BLD: 11.36 K/UL — HIGH (ref 3.8–10.5)
WBC # FLD AUTO: 11.36 K/UL — HIGH (ref 3.8–10.5)
WBC # FLD AUTO: 11.36 K/UL — HIGH (ref 3.8–10.5)

## 2023-12-27 PROCEDURE — 99232 SBSQ HOSP IP/OBS MODERATE 35: CPT

## 2023-12-27 RX ADMIN — INSULIN GLARGINE 16 UNIT(S): 100 INJECTION, SOLUTION SUBCUTANEOUS at 21:34

## 2023-12-27 RX ADMIN — Medication 3 MILLILITER(S): at 20:47

## 2023-12-27 RX ADMIN — LEVETIRACETAM 500 MILLIGRAM(S): 250 TABLET, FILM COATED ORAL at 06:01

## 2023-12-27 RX ADMIN — GABAPENTIN 100 MILLIGRAM(S): 400 CAPSULE ORAL at 13:32

## 2023-12-27 RX ADMIN — GABAPENTIN 100 MILLIGRAM(S): 400 CAPSULE ORAL at 21:33

## 2023-12-27 RX ADMIN — ATORVASTATIN CALCIUM 20 MILLIGRAM(S): 80 TABLET, FILM COATED ORAL at 21:33

## 2023-12-27 RX ADMIN — Medication 8 MILLIGRAM(S): at 06:01

## 2023-12-27 RX ADMIN — MEXILETINE HYDROCHLORIDE 200 MILLIGRAM(S): 150 CAPSULE ORAL at 06:02

## 2023-12-27 RX ADMIN — Medication 5 UNIT(S): at 11:59

## 2023-12-27 RX ADMIN — SENNA PLUS 2 TABLET(S): 8.6 TABLET ORAL at 21:33

## 2023-12-27 RX ADMIN — Medication 50 MILLIGRAM(S): at 06:02

## 2023-12-27 RX ADMIN — APIXABAN 5 MILLIGRAM(S): 2.5 TABLET, FILM COATED ORAL at 18:00

## 2023-12-27 RX ADMIN — MEXILETINE HYDROCHLORIDE 200 MILLIGRAM(S): 150 CAPSULE ORAL at 13:32

## 2023-12-27 RX ADMIN — Medication 1200 MILLIGRAM(S): at 18:00

## 2023-12-27 RX ADMIN — Medication 3 MILLILITER(S): at 14:47

## 2023-12-27 RX ADMIN — TIOTROPIUM BROMIDE 2 PUFF(S): 18 CAPSULE ORAL; RESPIRATORY (INHALATION) at 08:15

## 2023-12-27 RX ADMIN — LEVETIRACETAM 500 MILLIGRAM(S): 250 TABLET, FILM COATED ORAL at 18:00

## 2023-12-27 RX ADMIN — Medication 2: at 08:02

## 2023-12-27 RX ADMIN — ERTAPENEM SODIUM 120 MILLIGRAM(S): 1 INJECTION, POWDER, LYOPHILIZED, FOR SOLUTION INTRAMUSCULAR; INTRAVENOUS at 05:59

## 2023-12-27 RX ADMIN — Medication 2: at 12:00

## 2023-12-27 RX ADMIN — CLOPIDOGREL BISULFATE 75 MILLIGRAM(S): 75 TABLET, FILM COATED ORAL at 12:00

## 2023-12-27 RX ADMIN — Medication 5 UNIT(S): at 18:00

## 2023-12-27 RX ADMIN — MONTELUKAST 10 MILLIGRAM(S): 4 TABLET, CHEWABLE ORAL at 12:01

## 2023-12-27 RX ADMIN — FAMOTIDINE 20 MILLIGRAM(S): 10 INJECTION INTRAVENOUS at 12:01

## 2023-12-27 RX ADMIN — Medication 5 UNIT(S): at 08:02

## 2023-12-27 RX ADMIN — Medication 3 MILLILITER(S): at 08:14

## 2023-12-27 RX ADMIN — LACTULOSE 10 GRAM(S): 10 SOLUTION ORAL at 06:11

## 2023-12-27 RX ADMIN — APIXABAN 5 MILLIGRAM(S): 2.5 TABLET, FILM COATED ORAL at 05:59

## 2023-12-27 RX ADMIN — Medication 325 MILLIGRAM(S): at 12:01

## 2023-12-27 RX ADMIN — MEXILETINE HYDROCHLORIDE 200 MILLIGRAM(S): 150 CAPSULE ORAL at 21:33

## 2023-12-27 RX ADMIN — GABAPENTIN 100 MILLIGRAM(S): 400 CAPSULE ORAL at 06:00

## 2023-12-27 RX ADMIN — Medication 1200 MILLIGRAM(S): at 06:00

## 2023-12-27 RX ADMIN — Medication 3: at 18:01

## 2023-12-27 NOTE — PROGRESS NOTE ADULT - ASSESSMENT
73-year-old female patient with a history of A-fib, COPD, TAVR,  tracheomalacia, aortic dissection, ostomy secondary to colorectal cancer, adrenal insufficiency, on steroids, presents to the ED complaining of nausea, vomiting and SOB. Pt is a poor historian and not able to provide a full history. Only reports not feeling well and worsening SOB with productive cough. Per daughter over the phone, was not feeling well, was confused, with decrease appetite, not drinking, had an episode of vomiting. In Ed, was febrile with elevated WBC, found to be septic.     #Sepsis secondary to multifocal PNA and ESBL proteus bacteremia  sepsis resolved.    - c/w Ertapenem + diphenhydramine 50mg IV daily  - Course to run through 1/4 per ID  - s/p Midline  - CT abdomen/pelvis negative for any source of possible bacteremia     #. COPD   exacerbation resolved.  - c/w tiotropium 2.5 micrograms 2 puff daily  - c/w albuterol/ipratropium 3 mL q6   - c/w Mucinex 1200 oral q12  - restarted methylprednisolone 8 mg daily  - c/w montelukast 10 mg daily  - c/w Zofran 4mg q6  - f/u repeat BCx: NGTD  - ID recs appreciated    #Left foot pain  this has almost resolved  - c/w elevation of L foot  - f/u [facility] Foot official read  - appreciate podiatry recs    #Microcytic Anemia  - H/H noted to be 8.8/32.0  - MCV at 66.3   - will start ferrous sulfate 325 mg daily  - c/w trend h/h     #Chronic afib   - c/w Eliquis 5mg q12  - c/w Mexiletine 200 mg q8     #Type 2 DM with neuropathy  - a1c ~9.   - Glucose noted to be elevated today  - likely 2/2 to steroids given pre-CT for IV contrast allergy  - c/w to monitor glucose level  - c/w Lantus 16 units and Admelog 5 TID   - c/w Gabapentin 100 mg q8     #CAD   - c/w Plavix 75mg daily  - c/w atorvastatin 20 mg at bedtime    #Seizures   - c/w levetiracetam 500mg BID    S/p Colostomy   - c/w Senna daily     Dispo: Active 73-year-old female patient with a history of A-fib, COPD, TAVR,  tracheomalacia, aortic dissection, ostomy secondary to colorectal cancer, adrenal insufficiency, on steroids, presents to the ED complaining of nausea, vomiting and SOB. Pt is a poor historian and not able to provide a full history. Only reports not feeling well and worsening SOB with productive cough. Per daughter over the phone, was not feeling well, was confused, with decrease appetite, not drinking, had an episode of vomiting. In Ed, was febrile with elevated WBC, found to be septic.     #Sepsis secondary to multifocal PNA and ESBL proteus bacteremia  sepsis resolved.    - c/w Ertapenem + diphenhydramine 50mg IV daily  - Course to run through 1/4 per ID  - s/p Midline  - CT abdomen/pelvis negative for any source of possible bacteremia     #. COPD   exacerbation resolved.  - c/w tiotropium 2.5 micrograms 2 puff daily  - c/w albuterol/ipratropium 3 mL q6   - c/w Mucinex 1200 oral q12  - restarted methylprednisolone 8 mg daily  - c/w montelukast 10 mg daily  - c/w Zofran 4mg q6  - f/u repeat BCx: NGTD  - ID recs appreciated    #Left foot pain  this has almost resolved  - c/w elevation of L foot  - f/u X Ray L Foot official read  - appreciate podiatry recs    #Microcytic Anemia  - H/H noted to be 8.8/32.0  - MCV at 66.3   - will start ferrous sulfate 325 mg daily  - c/w trend h/h     #Chronic afib   - c/w Eliquis 5mg q12  - c/w Mexiletine 200 mg q8     #Type 2 DM with neuropathy  - a1c ~9.   - Glucose noted to be elevated today  - likely 2/2 to steroids given pre-CT for IV contrast allergy  - c/w to monitor glucose level  - c/w Lantus 16 units and Admelog 5 TID   - c/w Gabapentin 100 mg q8     #CAD   - c/w Plavix 75mg daily  - c/w atorvastatin 20 mg at bedtime    #Seizures   - c/w levetiracetam 500mg BID    S/p Colostomy   - c/w Senna daily     Dispo: Pending PT karen 73-year-old female patient with a history of A-fib, COPD, TAVR,  tracheomalacia, aortic dissection, ostomy secondary to colorectal cancer, adrenal insufficiency, on steroids, presents to the ED complaining of nausea, vomiting and SOB. Pt is a poor historian and not able to provide a full history. Only reports not feeling well and worsening SOB with productive cough. Per daughter over the phone, was not feeling well, was confused, with decrease appetite, not drinking, had an episode of vomiting. In Ed, was febrile with elevated WBC, found to be septic.     #Sepsis secondary to multifocal PNA and ESBL proteus bacteremia  - sepsis resolved    - c/w Ertapenem + diphenhydramine 50mg IV daily  - Course to run through 1/4 per ID  - s/p Midline placement   - CT abdomen/pelvis negative for any source of possible bacteremia     #COPD   - exacerbation resolved.  - c/w tiotropium 2.5 micrograms 2 puff daily  - c/w albuterol/ipratropium 3 mL q6   - c/w Mucinex 1200 oral q12  - c/w methylprednisolone 8 mg daily  - c/w montelukast 10 mg daily  - c/w Zofran 4mg q6  - f/u repeat BCx: NGTD  - ID recs appreciated    #Left foot pain  - this has almost resolved  - XRay L foot: negative  - c/w elevation of L foot  - appreciate podiatry recs    #Microcytic Anemia  - H/H noted to be 8.8/32.0  - MCV at 66.3   - c/w ferrous sulfate 325 mg daily  - c/w trend h/h     #Chronic Afib   - c/w Eliquis 5mg q12  - c/w Mexiletine 200 mg q8     #Type 2 DM with neuropathy  - a1c ~9.   - Glucose noted to be elevated today  - c/w to monitor glucose level  - c/w Lantus 16 units and Admelog 5 TID   - c/w Gabapentin 100 mg q8     #CAD   - c/w Plavix 75mg daily  - c/w atorvastatin 20 mg at bedtime    #Seizures   - c/w levetiracetam 500mg BID    S/p Colostomy   - c/w Senna daily     Dispo: Pending PT karen 73-year-old female patient with a history of A-fib, COPD, TAVR,  tracheomalacia, aortic dissection, ostomy secondary to colorectal cancer, adrenal insufficiency, on steroids, presents to the ED complaining of nausea, vomiting and SOB. Pt is a poor historian and not able to provide a full history. Only reports not feeling well and worsening SOB with productive cough. Per daughter over the phone, was not feeling well, was confused, with decrease appetite, not drinking, had an episode of vomiting. In Ed, was febrile with elevated WBC, found to be septic.     #Sepsis secondary to multifocal PNA and ESBL proteus bacteremia  - sepsis resolved    - c/w Ertapenem + diphenhydramine 50mg IV daily  - Course to run through 1/4 per ID  - s/p Midline placement   - CT abdomen/pelvis negative for any source of possible bacteremia     #COPD   - exacerbation resolved.  - c/w tiotropium 2.5 micrograms 2 puff daily  - c/w albuterol/ipratropium 3 mL q6   - c/w Mucinex 1200 oral q12  - c/w methylprednisolone 8 mg daily  - c/w montelukast 10 mg daily  - c/w Zofran 4mg q6  - f/u repeat BCx: NGTD  - ID recs appreciated    #Left foot pain  - this has almost resolved  - XRay L foot: negative  - c/w elevation of L foot  - appreciate podiatry recs    #Microcytic Anemia  - H/H noted to be 8.8/32.0  - MCV at 66.3   - c/w ferrous sulfate 325 mg daily  - c/w trend h/h     #Chronic Afib   - c/w Eliquis 5mg q12  - c/w Mexiletine 200 mg q8     #Type 2 DM with neuropathy  - a1c ~9.   - Glucose noted to be elevated today  - c/w to monitor glucose level  - c/w Lantus 16 units and Admelog 5 TID   - c/w Gabapentin 100 mg q8     #CAD   - c/w Plavix 75mg daily  - c/w atorvastatin 20 mg at bedtime    #Seizures   - c/w levetiracetam 500mg BID    #Colorectal Cancer  - S/p Colostomy   - c/w Senna daily     Dispo: Pending PT karen 73-year-old female patient with a history of A-fib, COPD, TAVR,  tracheomalacia, aortic dissection, ostomy secondary to colorectal cancer, adrenal insufficiency, on steroids, presents to the ED complaining of nausea, vomiting and SOB. Pt is a poor historian and not able to provide a full history. Only reports not feeling well and worsening SOB with productive cough. Per daughter over the phone, was not feeling well, was confused, with decrease appetite, not drinking, had an episode of vomiting. In Ed, was febrile with elevated WBC, found to be septic.     #Sepsis secondary to multifocal PNA and ESBL proteus bacteremia  - sepsis resolved    - c/w Ertapenem + diphenhydramine 50mg IV daily  - Course to run through 1/4 per ID  - s/p Midline placement   - CT abdomen/pelvis negative for any source of possible bacteremia     #COPD   - exacerbation resolved.  - c/w tiotropium 2.5 micrograms 2 puff daily  - c/w albuterol/ipratropium 3 mL q6   - c/w Mucinex 1200 oral q12  - c/w methylprednisolone 8 mg daily  - c/w montelukast 10 mg daily  - c/w Zofran 4mg q6  - f/u repeat BCx: NGTD  - ID recs appreciated    #Left foot pain  - this has almost resolved  - XRay L foot: negative  - c/w elevation of L foot  - appreciate podiatry recs    #Microcytic Anemia  - c/w ferrous sulfate 325 mg daily  - c/w trend h/h     #Chronic Afib   - c/w Eliquis 5mg q12  - c/w Mexiletine 200 mg q8     #Type 2 DM with neuropathy  - a1c ~9.   - Glucose noted to be elevated today  - c/w to monitor glucose level  - c/w Lantus 16 units and Admelog 5 TID   - c/w Gabapentin 100 mg q8     #CAD   - c/w Plavix 75mg daily  - c/w atorvastatin 20 mg at bedtime    #Seizures   - c/w levetiracetam 500mg BID    #Colorectal Cancer  - S/p Colostomy   - c/w Senna daily     Dispo: Pending PT karen

## 2023-12-27 NOTE — PHYSICAL THERAPY INITIAL EVALUATION ADULT - LIVES WITH, PROFILE
"Patient Name: Albaro Lala   DX: ALL (diagnosed 10/25/16)    Regimen: [id number] DI days 1-28  Vincristine(VCR) 1.5mg/m2/dose IV on days 1,8,15,43, and 50  Dexamethasone(DEX) PO 5mg/m2/dose BID on days 1-7 and 15-21  Doxorubicin(DOXO)  25mg/m2/dose IV on days 1,8, and 15  Pegasparaginase(PEG-ASp) 2500units/m2/dose IV on days 4 AND 43  Cyclophosphamide(CPM) 1000mg/m2/dose IV on day 29  Cytarabine(FUNMILAYO-C) 75mg/m2/dose IV/SubQ on days 29-32 AND 36-39  Thioguanine(TG) 60mg/m2/dose PO on days 29-42  Intrathecal Methotrexate(IT-MTX) for age 3-8.98 y/o = 12mg INTRATHECAL on days 1,29, and 36    To begin days 1 AND 29 therapy patients should have ANC > 750 and Plt > 75k. Delayed Intensification I is 8 weeks (56 days).     Allergies:  Review of patient's allergies indicates no known allergies.     Ht 1.052 m (3' 5.42\")  Wt 21.9 kg (48 lb 4.5 oz)  BMI 19.79 kg/m2 Body surface area is 0.80 meters squared.     PROTOCOL Parameters: Ht = 105.2 cm Wt = 21.4 kg  BSA 0.79 m2    No labs required for D32     Drug Order   (Drug name, dose, route, IV Fluid & volume, frequency, number of doses) Cycle: Delayed Intensification I  Day 32  Previous treatment: Delayed Intensification 1 Day 31 reportedly at [facility] yesterday   Medication = Cytarabine   Base Dose= 75 mg/m2  Calc Dose: Base Dose x 0.79 m2 = 59 mg  Final Dose = 60 mg  Route = IV  Fluid & Volume = NS 25 mL  Admin Duration = Over 15 min          <5% difference, ok to treat with final written dose       By my signature below, I confirm this process was performed independently with the BSA and all final chemotherapy dosing calculations congruent. I have reviewed the above chemotherapy order and that my calculation of the final dose and BSA (when applicable) corroborate those calculations of the  pharmacist. Discrepancies of 5% or greater in the written dose have been addressed and documented within the [facility] Progress notes.    Edenilson NguyễnD    " children

## 2023-12-27 NOTE — PHYSICAL THERAPY INITIAL EVALUATION ADULT - PERTINENT HX OF CURRENT PROBLEM, REHAB EVAL
Patient called saying she is struggling with nausea and morning sickness asking if we could send her in something into [facility] in [place]. Also told her she could try vitamin b6 and unisom over the counter   73-year-old female patient with a history of A-fib, COPD, TAVR,  tracheomalacia, aortic dissection, ostomy secondary to colorectal cancer, adrenal insufficiency, on steroids, presents to the ED complaining of nausea, vomiting and SOB.

## 2023-12-27 NOTE — ASSESSMENT
[FreeTextEntry1] : 1) had flu vacc this season; encouraged bivalent covid vacc.  2) s/p hospitalization for mucus plugging/PNA, s/p antibx; sputum/overall syx control followed by Dr. Allison.  For thicker mucus, suggesting mucinex BID for 3-5 day stints, honey lemon vicks steam (boil drops in water) BID when she can.  3) neuro input appreciated - had word finding difficulty, ?confusional state - at time appears her gas exchange was reasonable, has saturated fine on RA then and since.  Had neuro eval/scanning - has microvascular change on background of DM, HTN, HLD - on rosuva but in low dose; if no other etiology found neurologically will increase rosuva (?microvascular event, eg), also on clopdigrel.  4) s/p AV repair, had AI, also hx a fib on AC, rate controlled and volume compensated.  Sees card.  5) DM - now started with endocrine; on lantus/humalog, has chin sensor.  Microalbumin next opportunity if endocrine hasn't done so.  Has normal renal fxn,  Not on ARB or SGLT2, can consider if protein.  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  Will follow carefully for this as well.

## 2023-12-27 NOTE — REVIEW OF SYSTEMS
[Wheezing] : wheezing [Cough] : cough [Dyspnea on Exertion] : dyspnea on exertion [Negative] : Gastrointestinal [FreeTextEntry6] : baseline [de-identified] : see hpi

## 2023-12-27 NOTE — PHYSICAL EXAM
[No Acute Distress] : no acute distress [Well Nourished] : well nourished [Well Developed] : well developed [Well-Appearing] : well-appearing [Normal Outer Ear/Nose] : the outer ears and nose were normal in appearance [Normal Oropharynx] : the oropharynx was normal [No JVD] : no jugular venous distention [No Lymphadenopathy] : no lymphadenopathy [Supple] : supple [No Accessory Muscle Use] : no accessory muscle use [Normal Rate] : normal rate  [Regular Rhythm] : with a regular rhythm [Normal S1, S2] : normal S1 and S2 [No Edema] : there was no peripheral edema [No Extremity Clubbing/Cyanosis] : no extremity clubbing/cyanosis [Coordination Grossly Intact] : coordination grossly intact [No Focal Deficits] : no focal deficits [Normal Gait] : normal gait [de-identified] : sl distant, some scattered ronchi/crackles with clearing on cough, no wheeze appreciated [de-identified] : systolic murmur unchanged [de-identified] : using her walker, steady

## 2023-12-27 NOTE — PROGRESS NOTE ADULT - SUBJECTIVE AND OBJECTIVE BOX
Podiatry HPI: Patient is a 73F with PMHx of AFib, DM (uncontrolled), COPD, TAVR, tracheomalacia, aortic dissection, ostomy 2/2 colorectal cancer, adrenal insufficiency (on steroids) who is admitted for SOB/COPD exacerbation; podiatry consulted for L. heel wound. Patient states her ambulatory status is normally walking however; due to her health problems the last 2 years, she has been nonambulatory for majority of that time. Does not currently have a podiatrist and cannot recall if ever had a wound to L. foot. States her L. heel is very tender to touch. Denies current constitutional symptoms or further pedal complaints.     HPI:  73-year-old female patient with a history of A-fib, COPD, TAVR,  tracheomalacia, aortic dissection, ostomy secondary to colorectal cancer, adrenal insufficiency, on steroids, presents to the ED complaining of nausea, vomiting and SOB.  Pt is a poor historian and not able to provide a full history. Only reports not feeling well and worsening SOB with productive cough   Per daughter over the phone, was not feeling well, was confused, with decrease appetite, not drinking, had an episode of vomiting.   In Ed, was febrile with elevated WBC   During my encounter she denied having fever, headache, dizziness, chest pain, palpitation, abdominal pain, change in urinary and bowel habits     (21 Dec 2023 17:08)    Patient admits to  (-) Fevers, (-) Chills, (-) Nausea, (-) Vomiting, (-) Shortness of Breath (-) calf pain (-) chest pain     Medications albuterol/ipratropium for Nebulization 3 milliLiter(s) Nebulizer every 6 hours  apixaban 5 milliGRAM(s) Oral every 12 hours  atorvastatin 20 milliGRAM(s) Oral at bedtime  clopidogrel Tablet 75 milliGRAM(s) Oral daily  dextrose 5%. 1000 milliLiter(s) IV Continuous <Continuous>  dextrose 5%. 1000 milliLiter(s) IV Continuous <Continuous>  dextrose 50% Injectable 25 Gram(s) IV Push once  dextrose 50% Injectable 12.5 Gram(s) IV Push once  dextrose 50% Injectable 25 Gram(s) IV Push once  dextrose Oral Gel 15 Gram(s) Oral once PRN  diphenhydrAMINE Injectable 50 milliGRAM(s) IV Push daily  ertapenem  IVPB 1000 milliGRAM(s) IV Intermittent every 24 hours  famotidine    Tablet 20 milliGRAM(s) Oral daily  ferrous    sulfate 325 milliGRAM(s) Oral daily  gabapentin 100 milliGRAM(s) Oral every 8 hours  glucagon  Injectable 1 milliGRAM(s) IntraMuscular once  guaiFENesin ER 1200 milliGRAM(s) Oral every 12 hours  influenza  Vaccine (HIGH DOSE) 0.7 milliLiter(s) IntraMuscular once  insulin glargine Injectable (LANTUS) 16 Unit(s) SubCutaneous at bedtime  insulin lispro (ADMELOG) corrective regimen sliding scale   SubCutaneous three times a day before meals  insulin lispro Injectable (ADMELOG) 5 Unit(s) SubCutaneous three times a day before meals  lactulose Syrup 10 Gram(s) Oral daily PRN  levETIRAcetam 500 milliGRAM(s) Oral two times a day  methylPREDNISolone 8 milliGRAM(s) Oral daily  mexiletine 200 milliGRAM(s) Oral every 8 hours  montelukast 10 milliGRAM(s) Oral daily  ondansetron    Tablet 4 milliGRAM(s) Oral every 6 hours PRN  senna 2 Tablet(s) Oral at bedtime  tiotropium 2.5 MICROgram(s) Inhaler 2 Puff(s) Inhalation daily    FHFamily history of asthma    Family history of breast cancer (Sibling)    Family history of diabetes mellitus type II    ,   PMHAtrial fibrillation    Diabetes    Hypertension    COPD (chronic obstructive pulmonary disease)    Adrenal insufficiency    Aortic insufficiency    Pelvic fracture    Asthma    Hypertension    Tracheobronchomalacia    Colorectal cancer    Aortic disease    Rectal bleeding    Seizure    DVT (deep venous thrombosis)    TIA (transient ischemic attack)       PSHHistory of partial hysterectomy    H/O total knee replacement, bilateral    History of sinus surgery    Exostosis of orbit, left    H/O pelvic surgery    History of surgery    History of tracheomalacia    S/P bronchoscopy    Rectal bleeding    S/P TAVR (transcatheter aortic valve replacement)    S/P aortic valve replacement        Labs                          9.8    11.36 )-----------( 495      ( 27 Dec 2023 04:33 )             35.3      12-27    138  |  101  |  18.6  ----------------------------<  226<H>  4.5   |  26.0  |  0.58    Ca    8.7      27 Dec 2023 04:33  Phos  3.2     12-27  Mg     1.9     12-27    TPro  6.1<L>  /  Alb  2.6<L>  /  TBili  <0.2<L>  /  DBili  x   /  AST  14  /  ALT  10  /  AlkPhos  76  12-27     Vital Signs Last 24 Hrs  T(C): 36.4 (27 Dec 2023 08:47), Max: 36.8 (27 Dec 2023 04:49)  T(F): 97.6 (27 Dec 2023 08:47), Max: 98.2 (27 Dec 2023 04:49)  HR: 73 (27 Dec 2023 08:47) (71 - 81)  BP: 122/74 (27 Dec 2023 08:47) (122/68 - 126/75)  BP(mean): --  RR: 18 (27 Dec 2023 08:47) (18 - 18)  SpO2: 96% (27 Dec 2023 08:47) (92% - 96%)    Parameters below as of 27 Dec 2023 08:47  Patient On (Oxygen Delivery Method): room air      Sedimentation Rate, Erythrocyte: 50 mm/hr (11-01-23 @ 15:04)         C-Reactive Protein, Serum: 4 mg/L (11-01-23 @ 15:04)   WBC Count: 11.36 K/uL *H* (12-27-23 @ 04:33)      ROS: Unremarkable outside HPI      PHYSICAL EXAM  LE Focused:    Vasc: DP/PT pulses palpable 2/4 b/l; hair growth absent; no edema noted to b/l foot   Derm: No open wounds b/l; L. posterior heel bony prominence noted with overlying mild erythema without fluctuance, crepitus, streaking and some hyperkeratotic tissue.   Neuro: Protective sensation present b/l   MSK: Mild (reduced) POP to L. posterior heel; patient is mainly non ambulatory       IMAGING:   L. foot xray Reviewed  < from: Xray Foot AP + Lateral + Oblique, Left (12.24.23 @ 19:43) >    IMPRESSION:  No acute radiographic findings, if there is continued clinical concern   follow-up MRI can be ordered    --- End of Report ---    < end of copied text >      CULTURES: Culture Results:   Growth in anaerobic bottle: Gram Negative Rods  Direct identification is available within approximately 3-5  hours either by Blood Panel Multiplexed PCR or Direct  MALDI-TOF. Details: [email or web address] (12-21 @ 12:00)  Culture Results:   No growth at 48 Hours (12-21 @ 11:50)      A:      P:   Patient evaluated and Chart reviewed   Discussed diagnosis and treatment with patient  Offloading to bilateral Heels; patient refused CAIR boots however; agreeable to pillows for offloading; d/w patient risks of not offloading until full patient understanding   L. foot xrays reviewed: no pathology noted   Discussed importance of daily foot examinations and proper shoe gear and to importance of lower Fasting Blood Glucose levels  Podiatry to follow while in house.  Pt stable for DC per podiatry  Pt to f/u OP with private podiatrist Dr. Kate ZUNIGA  Discussed with Attending Dr. Edis ZUNIGA   Podiatry HPI: Patient is a 73F with PMHx of AFib, DM (uncontrolled), COPD, TAVR, tracheomalacia, aortic dissection, ostomy 2/2 colorectal cancer, adrenal insufficiency (on steroids) who is admitted for SOB/COPD exacerbation; podiatry consulted for L. heel wound. Patient states her ambulatory status is normally walking however; due to her health problems the last 2 years, she has been nonambulatory for majority of that time. Does not currently have a podiatrist and cannot recall if ever had a wound to L. foot. States her L. heel is very tender to touch. Denies current constitutional symptoms or further pedal complaints.     HPI:  73-year-old female patient with a history of A-fib, COPD, TAVR,  tracheomalacia, aortic dissection, ostomy secondary to colorectal cancer, adrenal insufficiency, on steroids, presents to the ED complaining of nausea, vomiting and SOB.  Pt is a poor historian and not able to provide a full history. Only reports not feeling well and worsening SOB with productive cough   Per daughter over the phone, was not feeling well, was confused, with decrease appetite, not drinking, had an episode of vomiting.   In Ed, was febrile with elevated WBC   During my encounter she denied having fever, headache, dizziness, chest pain, palpitation, abdominal pain, change in urinary and bowel habits     (21 Dec 2023 17:08)    Patient admits to  (-) Fevers, (-) Chills, (-) Nausea, (-) Vomiting, (-) Shortness of Breath (-) calf pain (-) chest pain     Medications albuterol/ipratropium for Nebulization 3 milliLiter(s) Nebulizer every 6 hours  apixaban 5 milliGRAM(s) Oral every 12 hours  atorvastatin 20 milliGRAM(s) Oral at bedtime  clopidogrel Tablet 75 milliGRAM(s) Oral daily  dextrose 5%. 1000 milliLiter(s) IV Continuous <Continuous>  dextrose 5%. 1000 milliLiter(s) IV Continuous <Continuous>  dextrose 50% Injectable 25 Gram(s) IV Push once  dextrose 50% Injectable 12.5 Gram(s) IV Push once  dextrose 50% Injectable 25 Gram(s) IV Push once  dextrose Oral Gel 15 Gram(s) Oral once PRN  diphenhydrAMINE Injectable 50 milliGRAM(s) IV Push daily  ertapenem  IVPB 1000 milliGRAM(s) IV Intermittent every 24 hours  famotidine    Tablet 20 milliGRAM(s) Oral daily  ferrous    sulfate 325 milliGRAM(s) Oral daily  gabapentin 100 milliGRAM(s) Oral every 8 hours  glucagon  Injectable 1 milliGRAM(s) IntraMuscular once  guaiFENesin ER 1200 milliGRAM(s) Oral every 12 hours  influenza  Vaccine (HIGH DOSE) 0.7 milliLiter(s) IntraMuscular once  insulin glargine Injectable (LANTUS) 16 Unit(s) SubCutaneous at bedtime  insulin lispro (ADMELOG) corrective regimen sliding scale   SubCutaneous three times a day before meals  insulin lispro Injectable (ADMELOG) 5 Unit(s) SubCutaneous three times a day before meals  lactulose Syrup 10 Gram(s) Oral daily PRN  levETIRAcetam 500 milliGRAM(s) Oral two times a day  methylPREDNISolone 8 milliGRAM(s) Oral daily  mexiletine 200 milliGRAM(s) Oral every 8 hours  montelukast 10 milliGRAM(s) Oral daily  ondansetron    Tablet 4 milliGRAM(s) Oral every 6 hours PRN  senna 2 Tablet(s) Oral at bedtime  tiotropium 2.5 MICROgram(s) Inhaler 2 Puff(s) Inhalation daily    FHFamily history of asthma    Family history of breast cancer (Sibling)    Family history of diabetes mellitus type II    ,   PMHAtrial fibrillation    Diabetes    Hypertension    COPD (chronic obstructive pulmonary disease)    Adrenal insufficiency    Aortic insufficiency    Pelvic fracture    Asthma    Hypertension    Tracheobronchomalacia    Colorectal cancer    Aortic disease    Rectal bleeding    Seizure    DVT (deep venous thrombosis)    TIA (transient ischemic attack)       PSHHistory of partial hysterectomy    H/O total knee replacement, bilateral    History of sinus surgery    Exostosis of orbit, left    H/O pelvic surgery    History of surgery    History of tracheomalacia    S/P bronchoscopy    Rectal bleeding    S/P TAVR (transcatheter aortic valve replacement)    S/P aortic valve replacement        Labs                          9.8    11.36 )-----------( 495      ( 27 Dec 2023 04:33 )             35.3      12-27    138  |  101  |  18.6  ----------------------------<  226<H>  4.5   |  26.0  |  0.58    Ca    8.7      27 Dec 2023 04:33  Phos  3.2     12-27  Mg     1.9     12-27    TPro  6.1<L>  /  Alb  2.6<L>  /  TBili  <0.2<L>  /  DBili  x   /  AST  14  /  ALT  10  /  AlkPhos  76  12-27     Vital Signs Last 24 Hrs  T(C): 36.4 (27 Dec 2023 08:47), Max: 36.8 (27 Dec 2023 04:49)  T(F): 97.6 (27 Dec 2023 08:47), Max: 98.2 (27 Dec 2023 04:49)  HR: 73 (27 Dec 2023 08:47) (71 - 81)  BP: 122/74 (27 Dec 2023 08:47) (122/68 - 126/75)  BP(mean): --  RR: 18 (27 Dec 2023 08:47) (18 - 18)  SpO2: 96% (27 Dec 2023 08:47) (92% - 96%)    Parameters below as of 27 Dec 2023 08:47  Patient On (Oxygen Delivery Method): room air      Sedimentation Rate, Erythrocyte: 50 mm/hr (11-01-23 @ 15:04)         C-Reactive Protein, Serum: 4 mg/L (11-01-23 @ 15:04)   WBC Count: 11.36 K/uL *H* (12-27-23 @ 04:33)      ROS: Unremarkable outside HPI      PHYSICAL EXAM  LE Focused:    Vasc: DP/PT pulses palpable 2/4 b/l; hair growth absent; no edema noted to b/l foot   Derm: No open wounds b/l; L. posterior heel bony prominence noted with overlying mild erythema without fluctuance, crepitus, streaking and some hyperkeratotic tissue.   Neuro: Protective sensation present b/l   MSK: Mild (reduced) POP to L. posterior heel; patient is mainly non ambulatory       IMAGING:   L. foot xray Reviewed  < from: Xray Foot AP + Lateral + Oblique, Left (12.24.23 @ 19:43) >    IMPRESSION:  No acute radiographic findings, if there is continued clinical concern   follow-up MRI can be ordered    --- End of Report ---    < end of copied text >      CULTURES: Culture Results:   Growth in anaerobic bottle: Gram Negative Rods  Direct identification is available within approximately 3-5  hours either by Blood Panel Multiplexed PCR or Direct  MALDI-TOF. Details: [email or web address] (12-21 @ 12:00)  Culture Results:   No growth at 48 Hours (12-21 @ 11:50)      A:      P:   Patient evaluated and Chart reviewed   Discussed diagnosis and treatment with patient  Offloading to bilateral Heels; patient refused CAIR boots however; agreeable to pillows for offloading; d/w patient risks of not offloading until full patient understanding   L. foot xrays reviewed: no pathology noted   Discussed importance of daily foot examinations and proper shoe gear and to importance of lower Fasting Blood Glucose levels  Podiatry to follow while in house.  Pt stable for DC per podiatry  Pt to f/u OP with private podiatrist Dr. Kate ZUNIGA  Discussed with Attending Dr. Edis ZUNIGA

## 2023-12-27 NOTE — PROGRESS NOTE ADULT - ATTENDING COMMENTS
Agree with above   Patient seen and examined together with medical residents. Reports feeling slight better   Vital signs,  labs and imaging  reviewed   Assessment and plan as above and discussed with residents   In addition to above will get speech and swallow evaluation
Admitted with PNA with history of allergy to penicillins. Now with bacteremia.  Cont erta + benadryl per ID  F/u repeat cultures  podiatry consult for new left heal pain disproportionate with exam. ct left foot.
Patient feeling much better. denies left foot pain.    Exam: lungs are clear to auscultation.  Left foot: non tender    Assessment and plan  I have edited and updated the resident note and concur with documentation.
Admitted with PNA with history of allergy to penicillins. Now with bacteremia.  Cont erta + benadryl per ID through 1/4  PT pending
Admitted with PNA with history of allergy to penicillins. Now with bacteremia. Concern that underlying etiology could be aspiration vs mucous plugging from recent initiation of glycopyrrolate at outpatient.   Cont erta + benadryl per ID through 1/4  PT rec [place]

## 2023-12-27 NOTE — PROGRESS NOTE ADULT - SUBJECTIVE AND OBJECTIVE BOX
73-year-old female patient with a history of A-fib, COPD, TAVR,  tracheomalacia, aortic dissection, ostomy secondary to colorectal cancer, adrenal insufficiency, on steroids, presents to the ED complaining of nausea, vomiting and SOB.    INTERVAL HPI/OVERNIGHT EVENTS:  - No acute events    SUBJECTIVE: Patient seen and examined at bedside.     ROS: All negative except as listed above.    VITAL SIGNS:  ICU Vital Signs Last 24 Hrs  T(C): 36.8 (27 Dec 2023 04:49), Max: 36.8 (27 Dec 2023 04:49)  T(F): 98.2 (27 Dec 2023 04:49), Max: 98.2 (27 Dec 2023 04:49)  HR: 72 (27 Dec 2023 04:49) (71 - 81)  BP: 124/67 (27 Dec 2023 04:49) (122/68 - 137/75)  BP(mean): --  ABP: --  ABP(mean): --  RR: 18 (27 Dec 2023 04:49) (18 - 18)  SpO2: 95% (27 Dec 2023 04:49) (93% - 98%)    O2 Parameters below as of 27 Dec 2023 04:49  Patient On (Oxygen Delivery Method): room air      Plateau pressure:   P/F ratio:     12-26 @ 07:01  -  12-27 @ 07:00  --------------------------------------------------------  IN: 0 mL / OUT: 650 mL / NET: -650 mL      CAPILLARY BLOOD GLUCOSE      POCT Blood Glucose.: 141 mg/dL (26 Dec 2023 21:04)      ECG: reviewed.    PHYSICAL EXAM:    GENERAL: NAD  HEAD: Swollen Eyelids, no JVD  EYES: EOMI, PERRLA, conjunctiva and sclera clear  NECK: Supple, trachea midline, no JVD  HEART: Irregular rate and rhythm, no murmurs,   LUNGS: CTAB  ABDOMEN: Soft, nontender, nondistended, colostomy in place  EXTREMITIES: No edema  NERVOUS SYSTEM:  A&Ox2, no focal deficits     MEDICATIONS:  MEDICATIONS  (STANDING):  albuterol/ipratropium for Nebulization 3 milliLiter(s) Nebulizer every 6 hours  apixaban 5 milliGRAM(s) Oral every 12 hours  atorvastatin 20 milliGRAM(s) Oral at bedtime  clopidogrel Tablet 75 milliGRAM(s) Oral daily  dextrose 5%. 1000 milliLiter(s) (100 mL/Hr) IV Continuous <Continuous>  dextrose 5%. 1000 milliLiter(s) (50 mL/Hr) IV Continuous <Continuous>  dextrose 50% Injectable 25 Gram(s) IV Push once  dextrose 50% Injectable 25 Gram(s) IV Push once  dextrose 50% Injectable 12.5 Gram(s) IV Push once  diphenhydrAMINE Injectable 50 milliGRAM(s) IV Push daily  ertapenem  IVPB 1000 milliGRAM(s) IV Intermittent every 24 hours  famotidine    Tablet 20 milliGRAM(s) Oral daily  ferrous    sulfate 325 milliGRAM(s) Oral daily  gabapentin 100 milliGRAM(s) Oral every 8 hours  glucagon  Injectable 1 milliGRAM(s) IntraMuscular once  guaiFENesin ER 1200 milliGRAM(s) Oral every 12 hours  influenza  Vaccine (HIGH DOSE) 0.7 milliLiter(s) IntraMuscular once  insulin glargine Injectable (LANTUS) 16 Unit(s) SubCutaneous at bedtime  insulin lispro (ADMELOG) corrective regimen sliding scale   SubCutaneous three times a day before meals  insulin lispro Injectable (ADMELOG) 5 Unit(s) SubCutaneous three times a day before meals  levETIRAcetam 500 milliGRAM(s) Oral two times a day  methylPREDNISolone 8 milliGRAM(s) Oral daily  mexiletine 200 milliGRAM(s) Oral every 8 hours  montelukast 10 milliGRAM(s) Oral daily  senna 2 Tablet(s) Oral at bedtime  tiotropium 2.5 MICROgram(s) Inhaler 2 Puff(s) Inhalation daily    MEDICATIONS  (PRN):  dextrose Oral Gel 15 Gram(s) Oral once PRN Blood Glucose LESS THAN 70 milliGRAM(s)/deciliter  lactulose Syrup 10 Gram(s) Oral daily PRN constipation  ondansetron    Tablet 4 milliGRAM(s) Oral every 6 hours PRN for nausea    ALLERGIES:  Allergies    aspirin (Short breath)  Dilaudid (Short breath)  tetanus toxoid (Short breath)  Avelox (Short breath; Pruritus)  Valium (Short breath)  codeine (Short breath)  shellfish (Anaphylaxis)  cefepime (Anaphylaxis)  penicillin (Anaphylaxis)  iodine (Short breath; Swelling)    Intolerances    LABS:                        9.8    11.36 )-----------( 495      ( 27 Dec 2023 04:33 )             35.3     12-27    138  |  101  |  18.6  ----------------------------<  226<H>  4.5   |  26.0  |  0.58    Ca    8.7      27 Dec 2023 04:33  Phos  3.2     12-27  Mg     1.9     12-27    TPro  6.1<L>  /  Alb  2.6<L>  /  TBili  <0.2<L>  /  DBili  x   /  AST  14  /  ALT  10  /  AlkPhos  76  12-27    Urinalysis Basic - ( 27 Dec 2023 04:33 )    Color: x / Appearance: x / SG: x / pH: x  Gluc: 226 mg/dL / Ketone: x  / Bili: x / Urobili: x   Blood: x / Protein: x / Nitrite: x   Leuk Esterase: x / RBC: x / WBC x   Sq Epi: x / Non Sq Epi: x / Bacteria: x      ABG:      vBG:    Micro:    Culture - Blood (collected 12-25-23 @ 08:13)  Source: .Blood Blood  Preliminary Report (12-26-23 @ 15:01):    No growth at 24 hours    Culture - Blood (collected 12-25-23 @ 08:08)  Source: .Blood Blood  Preliminary Report (12-26-23 @ 15:01):    No growth at 24 hours    Culture - Blood (collected 12-24-23 @ 11:12)  Source: .Blood Blood-Peripheral  Preliminary Report (12-26-23 @ 18:01):    No growth at 48 Hours    Culture - Blood (collected 12-24-23 @ 11:09)  Source: .Blood Blood-Peripheral  Preliminary Report (12-26-23 @ 18:01):    No growth at 48 Hours    Culture - Blood (collected 12-21-23 @ 12:00)  Source: .Blood Blood-Peripheral  Gram Stain (12-24-23 @ 06:42):    Growth in anaerobic bottle: Gram Negative Rods  Final Report (12-26-23 @ 09:44):    Growth in anaerobic bottle: Proteus mirabilis ESBL    Direct identification is available within approximately 3-5    hours either by Blood Panel Multiplexed PCR or Direct    MALDI-TOF. Details: [email or web address]  Organism: Blood Culture PCR  Proteus mirabilis ESBL (12-26-23 @ 09:44)  Organism: Proteus mirabilis ESBL (12-26-23 @ 09:44)      Method Type: GARRETT      -  Ampicillin: R >16 These ampicillin results predict results for amoxicillin      -  Ampicillin/Sulbactam: R 8/4      -  Aztreonam: R >16      -  Cefazolin: R >16      -  Cefepime: R >16      -  Ceftriaxone: R >32      -  Ciprofloxacin: R >2      -  Ertapenem: S <=0.5      -  Gentamicin: R >8      -  Levofloxacin: R >4      -  Meropenem: S <=1      -  Piperacillin/Tazobactam: R <=8      -  Tobramycin: R >8      -  Trimethoprim/Sulfamethoxazole: R >2/38  Organism: Blood Culture PCR (12-26-23 @ 09:44)      Method Type: PCR      -  Proteus species: Detec      -  ESBL: Detec      -  CTX-M Resistance Marker: Detec    Culture - Blood (collected 12-21-23 @ 11:50)  Source: .Blood Blood-Peripheral  Final Report (12-26-23 @ 16:00):    No growth at 5 days    RADIOLOGY & ADDITIONAL TESTS: Reviewed. 73-year-old female patient with a history of A-fib, COPD, TAVR,  tracheomalacia, aortic dissection, ostomy secondary to colorectal cancer, adrenal insufficiency, on steroids, presents to the ED complaining of nausea, vomiting and SOB.    INTERVAL HPI/OVERNIGHT EVENTS:  - No acute events    SUBJECTIVE: Patient seen and examined at bedside.     ROS: All negative except as listed above.    VITAL SIGNS:  ICU Vital Signs Last 24 Hrs  T(C): 36.8 (27 Dec 2023 04:49), Max: 36.8 (27 Dec 2023 04:49)  T(F): 98.2 (27 Dec 2023 04:49), Max: 98.2 (27 Dec 2023 04:49)  HR: 72 (27 Dec 2023 04:49) (71 - 81)  BP: 124/67 (27 Dec 2023 04:49) (122/68 - 137/75)  BP(mean): --  ABP: --  ABP(mean): --  RR: 18 (27 Dec 2023 04:49) (18 - 18)  SpO2: 95% (27 Dec 2023 04:49) (93% - 98%)    O2 Parameters below as of 27 Dec 2023 04:49  Patient On (Oxygen Delivery Method): room air      Plateau pressure:   P/F ratio:     12-26 @ 07:01  -  12-27 @ 07:00  --------------------------------------------------------  IN: 0 mL / OUT: 650 mL / NET: -650 mL      CAPILLARY BLOOD GLUCOSE      POCT Blood Glucose.: 141 mg/dL (26 Dec 2023 21:04)      ECG: reviewed.    PHYSICAL EXAM:    GENERAL: NAD  HEAD: Swollen Eyelids, no JVD  EYES: EOMI, PERRLA, conjunctiva and sclera clear  NECK: Supple, trachea midline, no JVD  HEART: Irregular rate and rhythm, no murmurs,   LUNGS: CTAB  ABDOMEN: Soft, nontender, nondistended, colostomy in place  EXTREMITIES: No edema  NERVOUS SYSTEM:  A&Ox2, no focal deficits     MEDICATIONS:  MEDICATIONS  (STANDING):  albuterol/ipratropium for Nebulization 3 milliLiter(s) Nebulizer every 6 hours  apixaban 5 milliGRAM(s) Oral every 12 hours  atorvastatin 20 milliGRAM(s) Oral at bedtime  clopidogrel Tablet 75 milliGRAM(s) Oral daily  dextrose 5%. 1000 milliLiter(s) (100 mL/Hr) IV Continuous <Continuous>  dextrose 5%. 1000 milliLiter(s) (50 mL/Hr) IV Continuous <Continuous>  dextrose 50% Injectable 25 Gram(s) IV Push once  dextrose 50% Injectable 25 Gram(s) IV Push once  dextrose 50% Injectable 12.5 Gram(s) IV Push once  diphenhydrAMINE Injectable 50 milliGRAM(s) IV Push daily  ertapenem  IVPB 1000 milliGRAM(s) IV Intermittent every 24 hours  famotidine    Tablet 20 milliGRAM(s) Oral daily  ferrous    sulfate 325 milliGRAM(s) Oral daily  gabapentin 100 milliGRAM(s) Oral every 8 hours  glucagon  Injectable 1 milliGRAM(s) IntraMuscular once  guaiFENesin ER 1200 milliGRAM(s) Oral every 12 hours  influenza  Vaccine (HIGH DOSE) 0.7 milliLiter(s) IntraMuscular once  insulin glargine Injectable (LANTUS) 16 Unit(s) SubCutaneous at bedtime  insulin lispro (ADMELOG) corrective regimen sliding scale   SubCutaneous three times a day before meals  insulin lispro Injectable (ADMELOG) 5 Unit(s) SubCutaneous three times a day before meals  levETIRAcetam 500 milliGRAM(s) Oral two times a day  methylPREDNISolone 8 milliGRAM(s) Oral daily  mexiletine 200 milliGRAM(s) Oral every 8 hours  montelukast 10 milliGRAM(s) Oral daily  senna 2 Tablet(s) Oral at bedtime  tiotropium 2.5 MICROgram(s) Inhaler 2 Puff(s) Inhalation daily    MEDICATIONS  (PRN):  dextrose Oral Gel 15 Gram(s) Oral once PRN Blood Glucose LESS THAN 70 milliGRAM(s)/deciliter  lactulose Syrup 10 Gram(s) Oral daily PRN constipation  ondansetron    Tablet 4 milliGRAM(s) Oral every 6 hours PRN for nausea    ALLERGIES:  Allergies    aspirin (Short breath)  Dilaudid (Short breath)  tetanus toxoid (Short breath)  Avelox (Short breath; Pruritus)  Valium (Short breath)  codeine (Short breath)  shellfish (Anaphylaxis)  cefepime (Anaphylaxis)  penicillin (Anaphylaxis)  iodine (Short breath; Swelling)    Intolerances    LABS:                        9.8    11.36 )-----------( 495      ( 27 Dec 2023 04:33 )             35.3     12-27    138  |  101  |  18.6  ----------------------------<  226<H>  4.5   |  26.0  |  0.58    Ca    8.7      27 Dec 2023 04:33  Phos  3.2     12-27  Mg     1.9     12-27    TPro  6.1<L>  /  Alb  2.6<L>  /  TBili  <0.2<L>  /  DBili  x   /  AST  14  /  ALT  10  /  AlkPhos  76  12-27    Urinalysis Basic - ( 27 Dec 2023 04:33 )    Color: x / Appearance: x / SG: x / pH: x  Gluc: 226 mg/dL / Ketone: x  / Bili: x / Urobili: x   Blood: x / Protein: x / Nitrite: x   Leuk Esterase: x / RBC: x / WBC x   Sq Epi: x / Non Sq Epi: x / Bacteria: x      ABG:      vBG:    Micro:    Culture - Blood (collected 12-25-23 @ 08:13)  Source: .Blood Blood  Preliminary Report (12-26-23 @ 15:01):    No growth at 24 hours    Culture - Blood (collected 12-25-23 @ 08:08)  Source: .Blood Blood  Preliminary Report (12-26-23 @ 15:01):    No growth at 24 hours    Culture - Blood (collected 12-24-23 @ 11:12)  Source: .Blood Blood-Peripheral  Preliminary Report (12-26-23 @ 18:01):    No growth at 48 Hours    Culture - Blood (collected 12-24-23 @ 11:09)  Source: .Blood Blood-Peripheral  Preliminary Report (12-26-23 @ 18:01):    No growth at 48 Hours    Culture - Blood (collected 12-21-23 @ 12:00)  Source: .Blood Blood-Peripheral  Gram Stain (12-24-23 @ 06:42):    Growth in anaerobic bottle: Gram Negative Rods  Final Report (12-26-23 @ 09:44):    Growth in anaerobic bottle: Proteus mirabilis ESBL    Direct identification is available within approximately 3-5    hours either by Blood Panel Multiplexed PCR or Direct    MALDI-TOF. Details: [email or web address]  Organism: Blood Culture PCR  Proteus mirabilis ESBL (12-26-23 @ 09:44)  Organism: Proteus mirabilis ESBL (12-26-23 @ 09:44)      Method Type: GARRETT      -  Ampicillin: R >16 These ampicillin results predict results for amoxicillin      -  Ampicillin/Sulbactam: R 8/4      -  Aztreonam: R >16      -  Cefazolin: R >16      -  Cefepime: R >16      -  Ceftriaxone: R >32      -  Ciprofloxacin: R >2      -  Ertapenem: S <=0.5      -  Gentamicin: R >8      -  Levofloxacin: R >4      -  Meropenem: S <=1      -  Piperacillin/Tazobactam: R <=8      -  Tobramycin: R >8      -  Trimethoprim/Sulfamethoxazole: R >2/38  Organism: Blood Culture PCR (12-26-23 @ 09:44)      Method Type: PCR      -  Proteus species: Detec      -  ESBL: Detec      -  CTX-M Resistance Marker: Detec    Culture - Blood (collected 12-21-23 @ 11:50)  Source: .Blood Blood-Peripheral  Final Report (12-26-23 @ 16:00):    No growth at 5 days    RADIOLOGY & ADDITIONAL TESTS: Reviewed. INCOMPLETE  73-year-old female patient with a history of A-fib, COPD, TAVR,  tracheomalacia, aortic dissection, ostomy secondary to colorectal cancer, adrenal insufficiency, on steroids, presents to the ED complaining of nausea, vomiting and SOB.    INTERVAL HPI/OVERNIGHT EVENTS:  - No acute events    SUBJECTIVE: Patient seen and examined at bedside.     ROS: All negative except as listed above.    VITAL SIGNS:  ICU Vital Signs Last 24 Hrs  T(C): 36.8 (27 Dec 2023 04:49), Max: 36.8 (27 Dec 2023 04:49)  T(F): 98.2 (27 Dec 2023 04:49), Max: 98.2 (27 Dec 2023 04:49)  HR: 72 (27 Dec 2023 04:49) (71 - 81)  BP: 124/67 (27 Dec 2023 04:49) (122/68 - 137/75)  BP(mean): --  ABP: --  ABP(mean): --  RR: 18 (27 Dec 2023 04:49) (18 - 18)  SpO2: 95% (27 Dec 2023 04:49) (93% - 98%)    O2 Parameters below as of 27 Dec 2023 04:49  Patient On (Oxygen Delivery Method): room air      Plateau pressure:   P/F ratio:     12-26 @ 07:01  -  12-27 @ 07:00  --------------------------------------------------------  IN: 0 mL / OUT: 650 mL / NET: -650 mL      CAPILLARY BLOOD GLUCOSE      POCT Blood Glucose.: 141 mg/dL (26 Dec 2023 21:04)      ECG: reviewed.    PHYSICAL EXAM:    GENERAL: NAD  HEAD: Swollen Eyelids, no JVD  EYES: EOMI, PERRLA, conjunctiva and sclera clear  NECK: Supple, trachea midline, no JVD  HEART: Irregular rate and rhythm, no murmurs,   LUNGS: CTAB  ABDOMEN: Soft, nontender, nondistended, colostomy in place  EXTREMITIES: No edema  NERVOUS SYSTEM:  A&Ox2, no focal deficits     MEDICATIONS:  MEDICATIONS  (STANDING):  albuterol/ipratropium for Nebulization 3 milliLiter(s) Nebulizer every 6 hours  apixaban 5 milliGRAM(s) Oral every 12 hours  atorvastatin 20 milliGRAM(s) Oral at bedtime  clopidogrel Tablet 75 milliGRAM(s) Oral daily  dextrose 5%. 1000 milliLiter(s) (100 mL/Hr) IV Continuous <Continuous>  dextrose 5%. 1000 milliLiter(s) (50 mL/Hr) IV Continuous <Continuous>  dextrose 50% Injectable 25 Gram(s) IV Push once  dextrose 50% Injectable 25 Gram(s) IV Push once  dextrose 50% Injectable 12.5 Gram(s) IV Push once  diphenhydrAMINE Injectable 50 milliGRAM(s) IV Push daily  ertapenem  IVPB 1000 milliGRAM(s) IV Intermittent every 24 hours  famotidine    Tablet 20 milliGRAM(s) Oral daily  ferrous    sulfate 325 milliGRAM(s) Oral daily  gabapentin 100 milliGRAM(s) Oral every 8 hours  glucagon  Injectable 1 milliGRAM(s) IntraMuscular once  guaiFENesin ER 1200 milliGRAM(s) Oral every 12 hours  influenza  Vaccine (HIGH DOSE) 0.7 milliLiter(s) IntraMuscular once  insulin glargine Injectable (LANTUS) 16 Unit(s) SubCutaneous at bedtime  insulin lispro (ADMELOG) corrective regimen sliding scale   SubCutaneous three times a day before meals  insulin lispro Injectable (ADMELOG) 5 Unit(s) SubCutaneous three times a day before meals  levETIRAcetam 500 milliGRAM(s) Oral two times a day  methylPREDNISolone 8 milliGRAM(s) Oral daily  mexiletine 200 milliGRAM(s) Oral every 8 hours  montelukast 10 milliGRAM(s) Oral daily  senna 2 Tablet(s) Oral at bedtime  tiotropium 2.5 MICROgram(s) Inhaler 2 Puff(s) Inhalation daily    MEDICATIONS  (PRN):  dextrose Oral Gel 15 Gram(s) Oral once PRN Blood Glucose LESS THAN 70 milliGRAM(s)/deciliter  lactulose Syrup 10 Gram(s) Oral daily PRN constipation  ondansetron    Tablet 4 milliGRAM(s) Oral every 6 hours PRN for nausea    ALLERGIES:  Allergies    aspirin (Short breath)  Dilaudid (Short breath)  tetanus toxoid (Short breath)  Avelox (Short breath; Pruritus)  Valium (Short breath)  codeine (Short breath)  shellfish (Anaphylaxis)  cefepime (Anaphylaxis)  penicillin (Anaphylaxis)  iodine (Short breath; Swelling)    Intolerances    LABS:                        9.8    11.36 )-----------( 495      ( 27 Dec 2023 04:33 )             35.3     12-27    138  |  101  |  18.6  ----------------------------<  226<H>  4.5   |  26.0  |  0.58    Ca    8.7      27 Dec 2023 04:33  Phos  3.2     12-27  Mg     1.9     12-27    TPro  6.1<L>  /  Alb  2.6<L>  /  TBili  <0.2<L>  /  DBili  x   /  AST  14  /  ALT  10  /  AlkPhos  76  12-27    Urinalysis Basic - ( 27 Dec 2023 04:33 )    Color: x / Appearance: x / SG: x / pH: x  Gluc: 226 mg/dL / Ketone: x  / Bili: x / Urobili: x   Blood: x / Protein: x / Nitrite: x   Leuk Esterase: x / RBC: x / WBC x   Sq Epi: x / Non Sq Epi: x / Bacteria: x      ABG:      vBG:    Micro:    Culture - Blood (collected 12-25-23 @ 08:13)  Source: .Blood Blood  Preliminary Report (12-26-23 @ 15:01):    No growth at 24 hours    Culture - Blood (collected 12-25-23 @ 08:08)  Source: .Blood Blood  Preliminary Report (12-26-23 @ 15:01):    No growth at 24 hours    Culture - Blood (collected 12-24-23 @ 11:12)  Source: .Blood Blood-Peripheral  Preliminary Report (12-26-23 @ 18:01):    No growth at 48 Hours    Culture - Blood (collected 12-24-23 @ 11:09)  Source: .Blood Blood-Peripheral  Preliminary Report (12-26-23 @ 18:01):    No growth at 48 Hours    Culture - Blood (collected 12-21-23 @ 12:00)  Source: .Blood Blood-Peripheral  Gram Stain (12-24-23 @ 06:42):    Growth in anaerobic bottle: Gram Negative Rods  Final Report (12-26-23 @ 09:44):    Growth in anaerobic bottle: Proteus mirabilis ESBL    Direct identification is available within approximately 3-5    hours either by Blood Panel Multiplexed PCR or Direct    MALDI-TOF. Details: [email or web address]  Organism: Blood Culture PCR  Proteus mirabilis ESBL (12-26-23 @ 09:44)  Organism: Proteus mirabilis ESBL (12-26-23 @ 09:44)      Method Type: GARRETT      -  Ampicillin: R >16 These ampicillin results predict results for amoxicillin      -  Ampicillin/Sulbactam: R 8/4      -  Aztreonam: R >16      -  Cefazolin: R >16      -  Cefepime: R >16      -  Ceftriaxone: R >32      -  Ciprofloxacin: R >2      -  Ertapenem: S <=0.5      -  Gentamicin: R >8      -  Levofloxacin: R >4      -  Meropenem: S <=1      -  Piperacillin/Tazobactam: R <=8      -  Tobramycin: R >8      -  Trimethoprim/Sulfamethoxazole: R >2/38  Organism: Blood Culture PCR (12-26-23 @ 09:44)      Method Type: PCR      -  Proteus species: Detec      -  ESBL: Detec      -  CTX-M Resistance Marker: Detec    Culture - Blood (collected 12-21-23 @ 11:50)  Source: .Blood Blood-Peripheral  Final Report (12-26-23 @ 16:00):    No growth at 5 days    RADIOLOGY & ADDITIONAL TESTS: Reviewed. INCOMPLETE  73-year-old female patient with a history of A-fib, COPD, TAVR,  tracheomalacia, aortic dissection, ostomy secondary to colorectal cancer, adrenal insufficiency, on steroids, presents to the ED complaining of nausea, vomiting and SOB.    INTERVAL HPI/OVERNIGHT EVENTS:  - No acute events    SUBJECTIVE: Patient seen and examined at bedside.     ROS: All negative except as listed above.    VITAL SIGNS:  ICU Vital Signs Last 24 Hrs  T(C): 36.8 (27 Dec 2023 04:49), Max: 36.8 (27 Dec 2023 04:49)  T(F): 98.2 (27 Dec 2023 04:49), Max: 98.2 (27 Dec 2023 04:49)  HR: 72 (27 Dec 2023 04:49) (71 - 81)  BP: 124/67 (27 Dec 2023 04:49) (122/68 - 137/75)  BP(mean): --  ABP: --  ABP(mean): --  RR: 18 (27 Dec 2023 04:49) (18 - 18)  SpO2: 95% (27 Dec 2023 04:49) (93% - 98%)    O2 Parameters below as of 27 Dec 2023 04:49  Patient On (Oxygen Delivery Method): room air      Plateau pressure:   P/F ratio:     12-26 @ 07:01  -  12-27 @ 07:00  --------------------------------------------------------  IN: 0 mL / OUT: 650 mL / NET: -650 mL      CAPILLARY BLOOD GLUCOSE      POCT Blood Glucose.: 141 mg/dL (26 Dec 2023 21:04)      ECG: reviewed.    PHYSICAL EXAM:    GENERAL: NAD  HEAD: Swollen Eyelids, no JVD  EYES: EOMI, PERRLA, conjunctiva and sclera clear  NECK: Supple, trachea midline, no JVD  HEART: Irregular rate and rhythm, no murmurs,   LUNGS: CTAB  ABDOMEN: Soft, nontender, nondistended, colostomy in place  EXTREMITIES: No edema  NERVOUS SYSTEM:  A&Ox2, no focal deficits     MEDICATIONS:  MEDICATIONS  (STANDING):  albuterol/ipratropium for Nebulization 3 milliLiter(s) Nebulizer every 6 hours  apixaban 5 milliGRAM(s) Oral every 12 hours  atorvastatin 20 milliGRAM(s) Oral at bedtime  clopidogrel Tablet 75 milliGRAM(s) Oral daily  dextrose 5%. 1000 milliLiter(s) (100 mL/Hr) IV Continuous <Continuous>  dextrose 5%. 1000 milliLiter(s) (50 mL/Hr) IV Continuous <Continuous>  dextrose 50% Injectable 25 Gram(s) IV Push once  dextrose 50% Injectable 25 Gram(s) IV Push once  dextrose 50% Injectable 12.5 Gram(s) IV Push once  diphenhydrAMINE Injectable 50 milliGRAM(s) IV Push daily  ertapenem  IVPB 1000 milliGRAM(s) IV Intermittent every 24 hours  famotidine    Tablet 20 milliGRAM(s) Oral daily  ferrous    sulfate 325 milliGRAM(s) Oral daily  gabapentin 100 milliGRAM(s) Oral every 8 hours  glucagon  Injectable 1 milliGRAM(s) IntraMuscular once  guaiFENesin ER 1200 milliGRAM(s) Oral every 12 hours  influenza  Vaccine (HIGH DOSE) 0.7 milliLiter(s) IntraMuscular once  insulin glargine Injectable (LANTUS) 16 Unit(s) SubCutaneous at bedtime  insulin lispro (ADMELOG) corrective regimen sliding scale   SubCutaneous three times a day before meals  insulin lispro Injectable (ADMELOG) 5 Unit(s) SubCutaneous three times a day before meals  levETIRAcetam 500 milliGRAM(s) Oral two times a day  methylPREDNISolone 8 milliGRAM(s) Oral daily  mexiletine 200 milliGRAM(s) Oral every 8 hours  montelukast 10 milliGRAM(s) Oral daily  senna 2 Tablet(s) Oral at bedtime  tiotropium 2.5 MICROgram(s) Inhaler 2 Puff(s) Inhalation daily    MEDICATIONS  (PRN):  dextrose Oral Gel 15 Gram(s) Oral once PRN Blood Glucose LESS THAN 70 milliGRAM(s)/deciliter  lactulose Syrup 10 Gram(s) Oral daily PRN constipation  ondansetron    Tablet 4 milliGRAM(s) Oral every 6 hours PRN for nausea    ALLERGIES:  Allergies    aspirin (Short breath)  Dilaudid (Short breath)  tetanus toxoid (Short breath)  Avelox (Short breath; Pruritus)  Valium (Short breath)  codeine (Short breath)  shellfish (Anaphylaxis)  cefepime (Anaphylaxis)  penicillin (Anaphylaxis)  iodine (Short breath; Swelling)    Intolerances    LABS:                        9.8    11.36 )-----------( 495      ( 27 Dec 2023 04:33 )             35.3     12-27    138  |  101  |  18.6  ----------------------------<  226<H>  4.5   |  26.0  |  0.58    Ca    8.7      27 Dec 2023 04:33  Phos  3.2     12-27  Mg     1.9     12-27    TPro  6.1<L>  /  Alb  2.6<L>  /  TBili  <0.2<L>  /  DBili  x   /  AST  14  /  ALT  10  /  AlkPhos  76  12-27    Urinalysis Basic - ( 27 Dec 2023 04:33 )    Color: x / Appearance: x / SG: x / pH: x  Gluc: 226 mg/dL / Ketone: x  / Bili: x / Urobili: x   Blood: x / Protein: x / Nitrite: x   Leuk Esterase: x / RBC: x / WBC x   Sq Epi: x / Non Sq Epi: x / Bacteria: x      ABG:      vBG:    Micro:    Culture - Blood (collected 12-25-23 @ 08:13)  Source: .Blood Blood  Preliminary Report (12-26-23 @ 15:01):    No growth at 24 hours    Culture - Blood (collected 12-25-23 @ 08:08)  Source: .Blood Blood  Preliminary Report (12-26-23 @ 15:01):    No growth at 24 hours    Culture - Blood (collected 12-24-23 @ 11:12)  Source: .Blood Blood-Peripheral  Preliminary Report (12-26-23 @ 18:01):    No growth at 48 Hours    Culture - Blood (collected 12-24-23 @ 11:09)  Source: .Blood Blood-Peripheral  Preliminary Report (12-26-23 @ 18:01):    No growth at 48 Hours    Culture - Blood (collected 12-21-23 @ 12:00)  Source: .Blood Blood-Peripheral  Gram Stain (12-24-23 @ 06:42):    Growth in anaerobic bottle: Gram Negative Rods  Final Report (12-26-23 @ 09:44):    Growth in anaerobic bottle: Proteus mirabilis ESBL    Direct identification is available within approximately 3-5    hours either by Blood Panel Multiplexed PCR or Direct    MALDI-TOF. Details: [email or web address]  Organism: Blood Culture PCR  Proteus mirabilis ESBL (12-26-23 @ 09:44)  Organism: Proteus mirabilis ESBL (12-26-23 @ 09:44)      Method Type: GARRETT      -  Ampicillin: R >16 These ampicillin results predict results for amoxicillin      -  Ampicillin/Sulbactam: R 8/4      -  Aztreonam: R >16      -  Cefazolin: R >16      -  Cefepime: R >16      -  Ceftriaxone: R >32      -  Ciprofloxacin: R >2      -  Ertapenem: S <=0.5      -  Gentamicin: R >8      -  Levofloxacin: R >4      -  Meropenem: S <=1      -  Piperacillin/Tazobactam: R <=8      -  Tobramycin: R >8      -  Trimethoprim/Sulfamethoxazole: R >2/38  Organism: Blood Culture PCR (12-26-23 @ 09:44)      Method Type: PCR      -  Proteus species: Detec      -  ESBL: Detec      -  CTX-M Resistance Marker: Detec    Culture - Blood (collected 12-21-23 @ 11:50)  Source: .Blood Blood-Peripheral  Final Report (12-26-23 @ 16:00):    No growth at 5 days    RADIOLOGY & ADDITIONAL TESTS: Reviewed. 73-year-old female patient with a history of A-fib, COPD, TAVR,  tracheomalacia, aortic dissection, ostomy secondary to colorectal cancer, adrenal insufficiency, on steroids, presents to the ED complaining of nausea, vomiting and SOB.    INTERVAL HPI/OVERNIGHT EVENTS:  - No acute events    SUBJECTIVE:   - Patient seen and examined at bedside.   - reports continued improvement in left heel pain.     ROS: All negative except as listed above.    VITAL SIGNS:  ICU Vital Signs Last 24 Hrs  T(C): 36.8 (27 Dec 2023 04:49), Max: 36.8 (27 Dec 2023 04:49)  T(F): 98.2 (27 Dec 2023 04:49), Max: 98.2 (27 Dec 2023 04:49)  HR: 72 (27 Dec 2023 04:49) (71 - 81)  BP: 124/67 (27 Dec 2023 04:49) (122/68 - 137/75)  BP(mean): --  ABP: --  ABP(mean): --  RR: 18 (27 Dec 2023 04:49) (18 - 18)  SpO2: 95% (27 Dec 2023 04:49) (93% - 98%)    O2 Parameters below as of 27 Dec 2023 04:49  Patient On (Oxygen Delivery Method): room air      Plateau pressure:   P/F ratio:     12-26 @ 07:01  -  12-27 @ 07:00  --------------------------------------------------------  IN: 0 mL / OUT: 650 mL / NET: -650 mL      CAPILLARY BLOOD GLUCOSE      POCT Blood Glucose.: 141 mg/dL (26 Dec 2023 21:04)      ECG: reviewed.    PHYSICAL EXAM:    GENERAL: NAD  HEAD: Swollen Eyelids, no JVD  EYES: EOMI, PERRLA, conjunctiva and sclera clear  NECK: Supple, trachea midline, no JVD  HEART: Irregular rate and rhythm, no murmurs,   LUNGS: CTAB  ABDOMEN: Soft, nontender, nondistended, colostomy in place  EXTREMITIES: No edema  NERVOUS SYSTEM:  A&Ox2, no focal deficits     MEDICATIONS:  MEDICATIONS  (STANDING):  albuterol/ipratropium for Nebulization 3 milliLiter(s) Nebulizer every 6 hours  apixaban 5 milliGRAM(s) Oral every 12 hours  atorvastatin 20 milliGRAM(s) Oral at bedtime  clopidogrel Tablet 75 milliGRAM(s) Oral daily  dextrose 5%. 1000 milliLiter(s) (100 mL/Hr) IV Continuous <Continuous>  dextrose 5%. 1000 milliLiter(s) (50 mL/Hr) IV Continuous <Continuous>  dextrose 50% Injectable 25 Gram(s) IV Push once  dextrose 50% Injectable 25 Gram(s) IV Push once  dextrose 50% Injectable 12.5 Gram(s) IV Push once  diphenhydrAMINE Injectable 50 milliGRAM(s) IV Push daily  ertapenem  IVPB 1000 milliGRAM(s) IV Intermittent every 24 hours  famotidine    Tablet 20 milliGRAM(s) Oral daily  ferrous    sulfate 325 milliGRAM(s) Oral daily  gabapentin 100 milliGRAM(s) Oral every 8 hours  glucagon  Injectable 1 milliGRAM(s) IntraMuscular once  guaiFENesin ER 1200 milliGRAM(s) Oral every 12 hours  influenza  Vaccine (HIGH DOSE) 0.7 milliLiter(s) IntraMuscular once  insulin glargine Injectable (LANTUS) 16 Unit(s) SubCutaneous at bedtime  insulin lispro (ADMELOG) corrective regimen sliding scale   SubCutaneous three times a day before meals  insulin lispro Injectable (ADMELOG) 5 Unit(s) SubCutaneous three times a day before meals  levETIRAcetam 500 milliGRAM(s) Oral two times a day  methylPREDNISolone 8 milliGRAM(s) Oral daily  mexiletine 200 milliGRAM(s) Oral every 8 hours  montelukast 10 milliGRAM(s) Oral daily  senna 2 Tablet(s) Oral at bedtime  tiotropium 2.5 MICROgram(s) Inhaler 2 Puff(s) Inhalation daily    MEDICATIONS  (PRN):  dextrose Oral Gel 15 Gram(s) Oral once PRN Blood Glucose LESS THAN 70 milliGRAM(s)/deciliter  lactulose Syrup 10 Gram(s) Oral daily PRN constipation  ondansetron    Tablet 4 milliGRAM(s) Oral every 6 hours PRN for nausea    ALLERGIES:  Allergies    aspirin (Short breath)  Dilaudid (Short breath)  tetanus toxoid (Short breath)  Avelox (Short breath; Pruritus)  Valium (Short breath)  codeine (Short breath)  shellfish (Anaphylaxis)  cefepime (Anaphylaxis)  penicillin (Anaphylaxis)  iodine (Short breath; Swelling)    Intolerances    LABS:                        9.8    11.36 )-----------( 495      ( 27 Dec 2023 04:33 )             35.3     12-27    138  |  101  |  18.6  ----------------------------<  226<H>  4.5   |  26.0  |  0.58    Ca    8.7      27 Dec 2023 04:33  Phos  3.2     12-27  Mg     1.9     12-27    TPro  6.1<L>  /  Alb  2.6<L>  /  TBili  <0.2<L>  /  DBili  x   /  AST  14  /  ALT  10  /  AlkPhos  76  12-27    Urinalysis Basic - ( 27 Dec 2023 04:33 )    Color: x / Appearance: x / SG: x / pH: x  Gluc: 226 mg/dL / Ketone: x  / Bili: x / Urobili: x   Blood: x / Protein: x / Nitrite: x   Leuk Esterase: x / RBC: x / WBC x   Sq Epi: x / Non Sq Epi: x / Bacteria: x      ABG:      vBG:    Micro:    Culture - Blood (collected 12-25-23 @ 08:13)  Source: .Blood Blood  Preliminary Report (12-26-23 @ 15:01):    No growth at 24 hours    Culture - Blood (collected 12-25-23 @ 08:08)  Source: .Blood Blood  Preliminary Report (12-26-23 @ 15:01):    No growth at 24 hours    Culture - Blood (collected 12-24-23 @ 11:12)  Source: .Blood Blood-Peripheral  Preliminary Report (12-26-23 @ 18:01):    No growth at 48 Hours    Culture - Blood (collected 12-24-23 @ 11:09)  Source: .Blood Blood-Peripheral  Preliminary Report (12-26-23 @ 18:01):    No growth at 48 Hours    Culture - Blood (collected 12-21-23 @ 12:00)  Source: .Blood Blood-Peripheral  Gram Stain (12-24-23 @ 06:42):    Growth in anaerobic bottle: Gram Negative Rods  Final Report (12-26-23 @ 09:44):    Growth in anaerobic bottle: Proteus mirabilis ESBL    Direct identification is available within approximately 3-5    hours either by Blood Panel Multiplexed PCR or Direct    MALDI-TOF. Details: [email or web address]  Organism: Blood Culture PCR  Proteus mirabilis ESBL (12-26-23 @ 09:44)  Organism: Proteus mirabilis ESBL (12-26-23 @ 09:44)      Method Type: GARRETT      -  Ampicillin: R >16 These ampicillin results predict results for amoxicillin      -  Ampicillin/Sulbactam: R 8/4      -  Aztreonam: R >16      -  Cefazolin: R >16      -  Cefepime: R >16      -  Ceftriaxone: R >32      -  Ciprofloxacin: R >2      -  Ertapenem: S <=0.5      -  Gentamicin: R >8      -  Levofloxacin: R >4      -  Meropenem: S <=1      -  Piperacillin/Tazobactam: R <=8      -  Tobramycin: R >8      -  Trimethoprim/Sulfamethoxazole: R >2/38  Organism: Blood Culture PCR (12-26-23 @ 09:44)      Method Type: PCR      -  Proteus species: Detec      -  ESBL: Detec      -  CTX-M Resistance Marker: Detec    Culture - Blood (collected 12-21-23 @ 11:50)  Source: .Blood Blood-Peripheral  Final Report (12-26-23 @ 16:00):    No growth at 5 days    RADIOLOGY & ADDITIONAL TESTS: Reviewed. 73-year-old female patient with a history of A-fib, COPD, TAVR,  tracheomalacia, aortic dissection, ostomy secondary to colorectal cancer, adrenal insufficiency, on steroids, presents to the ED complaining of nausea, vomiting and SOB.    INTERVAL HPI/OVERNIGHT EVENTS:  - No acute events    SUBJECTIVE:   - Patient seen and examined at bedside.   - reports continued improvement in left heel pain.     ROS: All negative except as listed above.    VITAL SIGNS:  ICU Vital Signs Last 24 Hrs  T(C): 36.8 (27 Dec 2023 04:49), Max: 36.8 (27 Dec 2023 04:49)  T(F): 98.2 (27 Dec 2023 04:49), Max: 98.2 (27 Dec 2023 04:49)  HR: 72 (27 Dec 2023 04:49) (71 - 81)  BP: 124/67 (27 Dec 2023 04:49) (122/68 - 137/75)  BP(mean): --  ABP: --  ABP(mean): --  RR: 18 (27 Dec 2023 04:49) (18 - 18)  SpO2: 95% (27 Dec 2023 04:49) (93% - 98%)    O2 Parameters below as of 27 Dec 2023 04:49  Patient On (Oxygen Delivery Method): room air      Plateau pressure:   P/F ratio:     12-26 @ 07:01  -  12-27 @ 07:00  --------------------------------------------------------  IN: 0 mL / OUT: 650 mL / NET: -650 mL      CAPILLARY BLOOD GLUCOSE      POCT Blood Glucose.: 141 mg/dL (26 Dec 2023 21:04)      ECG: reviewed.    PHYSICAL EXAM:    GENERAL: NAD  HEAD: Swollen Eyelids, no JVD  EYES: EOMI, PERRLA, conjunctiva and sclera clear  NECK: Supple, trachea midline, no JVD  HEART: Irregular rate and rhythm, no murmurs,   LUNGS: CTAB  ABDOMEN: Soft, nontender, nondistended, colostomy in place  EXTREMITIES: No edema  NERVOUS SYSTEM:  A&Ox2, no focal deficits     MEDICATIONS:  MEDICATIONS  (STANDING):  albuterol/ipratropium for Nebulization 3 milliLiter(s) Nebulizer every 6 hours  apixaban 5 milliGRAM(s) Oral every 12 hours  atorvastatin 20 milliGRAM(s) Oral at bedtime  clopidogrel Tablet 75 milliGRAM(s) Oral daily  dextrose 5%. 1000 milliLiter(s) (100 mL/Hr) IV Continuous <Continuous>  dextrose 5%. 1000 milliLiter(s) (50 mL/Hr) IV Continuous <Continuous>  dextrose 50% Injectable 25 Gram(s) IV Push once  dextrose 50% Injectable 25 Gram(s) IV Push once  dextrose 50% Injectable 12.5 Gram(s) IV Push once  diphenhydrAMINE Injectable 50 milliGRAM(s) IV Push daily  ertapenem  IVPB 1000 milliGRAM(s) IV Intermittent every 24 hours  famotidine    Tablet 20 milliGRAM(s) Oral daily  ferrous    sulfate 325 milliGRAM(s) Oral daily  gabapentin 100 milliGRAM(s) Oral every 8 hours  glucagon  Injectable 1 milliGRAM(s) IntraMuscular once  guaiFENesin ER 1200 milliGRAM(s) Oral every 12 hours  influenza  Vaccine (HIGH DOSE) 0.7 milliLiter(s) IntraMuscular once  insulin glargine Injectable (LANTUS) 16 Unit(s) SubCutaneous at bedtime  insulin lispro (ADMELOG) corrective regimen sliding scale   SubCutaneous three times a day before meals  insulin lispro Injectable (ADMELOG) 5 Unit(s) SubCutaneous three times a day before meals  levETIRAcetam 500 milliGRAM(s) Oral two times a day  methylPREDNISolone 8 milliGRAM(s) Oral daily  mexiletine 200 milliGRAM(s) Oral every 8 hours  montelukast 10 milliGRAM(s) Oral daily  senna 2 Tablet(s) Oral at bedtime  tiotropium 2.5 MICROgram(s) Inhaler 2 Puff(s) Inhalation daily    MEDICATIONS  (PRN):  dextrose Oral Gel 15 Gram(s) Oral once PRN Blood Glucose LESS THAN 70 milliGRAM(s)/deciliter  lactulose Syrup 10 Gram(s) Oral daily PRN constipation  ondansetron    Tablet 4 milliGRAM(s) Oral every 6 hours PRN for nausea    ALLERGIES:  Allergies    aspirin (Short breath)  Dilaudid (Short breath)  tetanus toxoid (Short breath)  Avelox (Short breath; Pruritus)  Valium (Short breath)  codeine (Short breath)  shellfish (Anaphylaxis)  cefepime (Anaphylaxis)  penicillin (Anaphylaxis)  iodine (Short breath; Swelling)    Intolerances    LABS:                        9.8    11.36 )-----------( 495      ( 27 Dec 2023 04:33 )             35.3     12-27    138  |  101  |  18.6  ----------------------------<  226<H>  4.5   |  26.0  |  0.58    Ca    8.7      27 Dec 2023 04:33  Phos  3.2     12-27  Mg     1.9     12-27    TPro  6.1<L>  /  Alb  2.6<L>  /  TBili  <0.2<L>  /  DBili  x   /  AST  14  /  ALT  10  /  AlkPhos  76  12-27    Urinalysis Basic - ( 27 Dec 2023 04:33 )    Color: x / Appearance: x / SG: x / pH: x  Gluc: 226 mg/dL / Ketone: x  / Bili: x / Urobili: x   Blood: x / Protein: x / Nitrite: x   Leuk Esterase: x / RBC: x / WBC x   Sq Epi: x / Non Sq Epi: x / Bacteria: x      ABG:      vBG:    Micro:    Culture - Blood (collected 12-25-23 @ 08:13)  Source: .Blood Blood  Preliminary Report (12-26-23 @ 15:01):    No growth at 24 hours    Culture - Blood (collected 12-25-23 @ 08:08)  Source: .Blood Blood  Preliminary Report (12-26-23 @ 15:01):    No growth at 24 hours    Culture - Blood (collected 12-24-23 @ 11:12)  Source: .Blood Blood-Peripheral  Preliminary Report (12-26-23 @ 18:01):    No growth at 48 Hours    Culture - Blood (collected 12-24-23 @ 11:09)  Source: .Blood Blood-Peripheral  Preliminary Report (12-26-23 @ 18:01):    No growth at 48 Hours    Culture - Blood (collected 12-21-23 @ 12:00)  Source: .Blood Blood-Peripheral  Gram Stain (12-24-23 @ 06:42):    Growth in anaerobic bottle: Gram Negative Rods  Final Report (12-26-23 @ 09:44):    Growth in anaerobic bottle: Proteus mirabilis ESBL    Direct identification is available within approximately 3-5    hours either by Blood Panel Multiplexed PCR or Direct    MALDI-TOF. Details: [email or web address]  Organism: Blood Culture PCR  Proteus mirabilis ESBL (12-26-23 @ 09:44)  Organism: Proteus mirabilis ESBL (12-26-23 @ 09:44)      Method Type: GARRETT      -  Ampicillin: R >16 These ampicillin results predict results for amoxicillin      -  Ampicillin/Sulbactam: R 8/4      -  Aztreonam: R >16      -  Cefazolin: R >16      -  Cefepime: R >16      -  Ceftriaxone: R >32      -  Ciprofloxacin: R >2      -  Ertapenem: S <=0.5      -  Gentamicin: R >8      -  Levofloxacin: R >4      -  Meropenem: S <=1      -  Piperacillin/Tazobactam: R <=8      -  Tobramycin: R >8      -  Trimethoprim/Sulfamethoxazole: R >2/38  Organism: Blood Culture PCR (12-26-23 @ 09:44)      Method Type: PCR      -  Proteus species: Detec      -  ESBL: Detec      -  CTX-M Resistance Marker: Detec    Culture - Blood (collected 12-21-23 @ 11:50)  Source: .Blood Blood-Peripheral  Final Report (12-26-23 @ 16:00):    No growth at 5 days    RADIOLOGY & ADDITIONAL TESTS: Reviewed. 73-year-old female patient with a history of A-fib, COPD, TAVR,  tracheomalacia, aortic dissection, ostomy secondary to colorectal cancer, adrenal insufficiency, on steroids, presents to the ED complaining of nausea, vomiting and SOB.    INTERVAL HPI/OVERNIGHT EVENTS:  - No acute events    SUBJECTIVE:   - Patient seen and examined at bedside.   - reports continued improvement in left heel pain.     ROS: All negative except as listed above.    VITAL SIGNS:  ICU Vital Signs Last 24 Hrs  T(C): 36.8 (27 Dec 2023 04:49), Max: 36.8 (27 Dec 2023 04:49)  T(F): 98.2 (27 Dec 2023 04:49), Max: 98.2 (27 Dec 2023 04:49)  HR: 72 (27 Dec 2023 04:49) (71 - 81)  BP: 124/67 (27 Dec 2023 04:49) (122/68 - 137/75)  RR: 18 (27 Dec 2023 04:49) (18 - 18)  SpO2: 95% (27 Dec 2023 04:49) (93% - 98%)    O2 Parameters below as of 27 Dec 2023 04:49  Patient On (Oxygen Delivery Method): room air      Plateau pressure:   P/F ratio:     12-26 @ 07:01  -  12-27 @ 07:00  --------------------------------------------------------  IN: 0 mL / OUT: 650 mL / NET: -650 mL    CAPILLARY BLOOD GLUCOSE    POCT Blood Glucose.: 141 mg/dL (26 Dec 2023 21:04)    ECG: reviewed.    PHYSICAL EXAM:    GENERAL: NAD  HEAD: Swollen Eyelids, no JVD  EYES: EOMI, PERRLA, conjunctiva and sclera clear  NECK: Supple, trachea midline, no JVD  HEART: Irregular rate and rhythm, no murmurs  LUNGS: CTAB  ABDOMEN: Soft, nontender, nondistended, colostomy in place  EXTREMITIES: No edema  NERVOUS SYSTEM:  A&Ox2, no focal deficits     MEDICATIONS:  MEDICATIONS  (STANDING):  albuterol/ipratropium for Nebulization 3 milliLiter(s) Nebulizer every 6 hours  apixaban 5 milliGRAM(s) Oral every 12 hours  atorvastatin 20 milliGRAM(s) Oral at bedtime  clopidogrel Tablet 75 milliGRAM(s) Oral daily  dextrose 5%. 1000 milliLiter(s) (100 mL/Hr) IV Continuous <Continuous>  dextrose 5%. 1000 milliLiter(s) (50 mL/Hr) IV Continuous <Continuous>  dextrose 50% Injectable 25 Gram(s) IV Push once  dextrose 50% Injectable 25 Gram(s) IV Push once  dextrose 50% Injectable 12.5 Gram(s) IV Push once  diphenhydrAMINE Injectable 50 milliGRAM(s) IV Push daily  ertapenem  IVPB 1000 milliGRAM(s) IV Intermittent every 24 hours  famotidine    Tablet 20 milliGRAM(s) Oral daily  ferrous    sulfate 325 milliGRAM(s) Oral daily  gabapentin 100 milliGRAM(s) Oral every 8 hours  glucagon  Injectable 1 milliGRAM(s) IntraMuscular once  guaiFENesin ER 1200 milliGRAM(s) Oral every 12 hours  influenza  Vaccine (HIGH DOSE) 0.7 milliLiter(s) IntraMuscular once  insulin glargine Injectable (LANTUS) 16 Unit(s) SubCutaneous at bedtime  insulin lispro (ADMELOG) corrective regimen sliding scale   SubCutaneous three times a day before meals  insulin lispro Injectable (ADMELOG) 5 Unit(s) SubCutaneous three times a day before meals  levETIRAcetam 500 milliGRAM(s) Oral two times a day  methylPREDNISolone 8 milliGRAM(s) Oral daily  mexiletine 200 milliGRAM(s) Oral every 8 hours  montelukast 10 milliGRAM(s) Oral daily  senna 2 Tablet(s) Oral at bedtime  tiotropium 2.5 MICROgram(s) Inhaler 2 Puff(s) Inhalation daily    MEDICATIONS  (PRN):  dextrose Oral Gel 15 Gram(s) Oral once PRN Blood Glucose LESS THAN 70 milliGRAM(s)/deciliter  lactulose Syrup 10 Gram(s) Oral daily PRN constipation  ondansetron    Tablet 4 milliGRAM(s) Oral every 6 hours PRN for nausea    ALLERGIES:  Allergies    aspirin (Short breath)  Dilaudid (Short breath)  tetanus toxoid (Short breath)  Avelox (Short breath; Pruritus)  Valium (Short breath)  codeine (Short breath)  shellfish (Anaphylaxis)  cefepime (Anaphylaxis)  penicillin (Anaphylaxis)  iodine (Short breath; Swelling)    Intolerances    LABS:                        9.8    11.36 )-----------( 495      ( 27 Dec 2023 04:33 )             35.3     12-27    138  |  101  |  18.6  ----------------------------<  226<H>  4.5   |  26.0  |  0.58    Ca    8.7      27 Dec 2023 04:33  Phos  3.2     12-27  Mg     1.9     12-27    TPro  6.1<L>  /  Alb  2.6<L>  /  TBili  <0.2<L>  /  DBili  x   /  AST  14  /  ALT  10  /  AlkPhos  76  12-27    Urinalysis Basic - ( 27 Dec 2023 04:33 )    Color: x / Appearance: x / SG: x / pH: x  Gluc: 226 mg/dL / Ketone: x  / Bili: x / Urobili: x   Blood: x / Protein: x / Nitrite: x   Leuk Esterase: x / RBC: x / WBC x   Sq Epi: x / Non Sq Epi: x / Bacteria: x    Micro:    Culture - Blood (collected 12-25-23 @ 08:13)  Source: .Blood Blood  Preliminary Report (12-26-23 @ 15:01):    No growth at 24 hours    Culture - Blood (collected 12-25-23 @ 08:08)  Source: .Blood Blood  Preliminary Report (12-26-23 @ 15:01):    No growth at 24 hours    Culture - Blood (collected 12-24-23 @ 11:12)  Source: .Blood Blood-Peripheral  Preliminary Report (12-26-23 @ 18:01):    No growth at 48 Hours    Culture - Blood (collected 12-24-23 @ 11:09)  Source: .Blood Blood-Peripheral  Preliminary Report (12-26-23 @ 18:01):    No growth at 48 Hours    Culture - Blood (collected 12-21-23 @ 12:00)  Source: .Blood Blood-Peripheral  Gram Stain (12-24-23 @ 06:42):    Growth in anaerobic bottle: Gram Negative Rods  Final Report (12-26-23 @ 09:44):    Growth in anaerobic bottle: Proteus mirabilis ESBL    Direct identification is available within approximately 3-5    hours either by Blood Panel Multiplexed PCR or Direct    MALDI-TOF. Details: [email or web address]  Organism: Blood Culture PCR  Proteus mirabilis ESBL (12-26-23 @ 09:44)  Organism: Proteus mirabilis ESBL (12-26-23 @ 09:44)      Method Type: GARRETT      -  Ampicillin: R >16 These ampicillin results predict results for amoxicillin      -  Ampicillin/Sulbactam: R 8/4      -  Aztreonam: R >16      -  Cefazolin: R >16      -  Cefepime: R >16      -  Ceftriaxone: R >32      -  Ciprofloxacin: R >2      -  Ertapenem: S <=0.5      -  Gentamicin: R >8      -  Levofloxacin: R >4      -  Meropenem: S <=1      -  Piperacillin/Tazobactam: R <=8      -  Tobramycin: R >8      -  Trimethoprim/Sulfamethoxazole: R >2/38  Organism: Blood Culture PCR (12-26-23 @ 09:44)      Method Type: PCR      -  Proteus species: Detec      -  ESBL: Detec      -  CTX-M Resistance Marker: Detec    Culture - Blood (collected 12-21-23 @ 11:50)  Source: .Blood Blood-Peripheral  Final Report (12-26-23 @ 16:00):    No growth at 5 days    RADIOLOGY & ADDITIONAL TESTS: Reviewed. 73-year-old female patient with a history of A-fib, COPD, TAVR,  tracheomalacia, aortic dissection, ostomy secondary to colorectal cancer, adrenal insufficiency, on steroids, presents to the ED complaining of nausea, vomiting and SOB.    INTERVAL HPI/OVERNIGHT EVENTS:  - No acute events    SUBJECTIVE:   - Patient seen and examined at bedside.   - reports continued improvement in left heel pain.     ROS: All negative except as listed above.    VITAL SIGNS:  ICU Vital Signs Last 24 Hrs  T(C): 36.8 (27 Dec 2023 04:49), Max: 36.8 (27 Dec 2023 04:49)  T(F): 98.2 (27 Dec 2023 04:49), Max: 98.2 (27 Dec 2023 04:49)  HR: 72 (27 Dec 2023 04:49) (71 - 81)  BP: 124/67 (27 Dec 2023 04:49) (122/68 - 137/75)  RR: 18 (27 Dec 2023 04:49) (18 - 18)  SpO2: 95% (27 Dec 2023 04:49) (93% - 98%)    O2 Parameters below as of 27 Dec 2023 04:49  Patient On (Oxygen Delivery Method): room air      Plateau pressure:   P/F ratio:     12-26 @ 07:01  -  12-27 @ 07:00  --------------------------------------------------------  IN: 0 mL / OUT: 650 mL / NET: -650 mL    CAPILLARY BLOOD GLUCOSE    POCT Blood Glucose.: 141 mg/dL (26 Dec 2023 21:04)    ECG: reviewed.    PHYSICAL EXAM:    GENERAL: NAD  HEAD: Swollen Eyelids, no JVD  EYES: EOMI, PERRLA, conjunctiva and sclera clear  NECK: Supple, trachea midline, no JVD  HEART: Irregular rate and rhythm, no murmurs  LUNGS: CTAB  ABDOMEN: Soft, nontender, nondistended, colostomy in place  EXTREMITIES: No edema  NERVOUS SYSTEM:  A&Ox2, no focal deficits     MEDICATIONS:  MEDICATIONS  (STANDING):  albuterol/ipratropium for Nebulization 3 milliLiter(s) Nebulizer every 6 hours  apixaban 5 milliGRAM(s) Oral every 12 hours  atorvastatin 20 milliGRAM(s) Oral at bedtime  clopidogrel Tablet 75 milliGRAM(s) Oral daily  dextrose 5%. 1000 milliLiter(s) (100 mL/Hr) IV Continuous <Continuous>  dextrose 5%. 1000 milliLiter(s) (50 mL/Hr) IV Continuous <Continuous>  dextrose 50% Injectable 25 Gram(s) IV Push once  dextrose 50% Injectable 25 Gram(s) IV Push once  dextrose 50% Injectable 12.5 Gram(s) IV Push once  diphenhydrAMINE Injectable 50 milliGRAM(s) IV Push daily  ertapenem  IVPB 1000 milliGRAM(s) IV Intermittent every 24 hours  famotidine    Tablet 20 milliGRAM(s) Oral daily  ferrous    sulfate 325 milliGRAM(s) Oral daily  gabapentin 100 milliGRAM(s) Oral every 8 hours  glucagon  Injectable 1 milliGRAM(s) IntraMuscular once  guaiFENesin ER 1200 milliGRAM(s) Oral every 12 hours  influenza  Vaccine (HIGH DOSE) 0.7 milliLiter(s) IntraMuscular once  insulin glargine Injectable (LANTUS) 16 Unit(s) SubCutaneous at bedtime  insulin lispro (ADMELOG) corrective regimen sliding scale   SubCutaneous three times a day before meals  insulin lispro Injectable (ADMELOG) 5 Unit(s) SubCutaneous three times a day before meals  levETIRAcetam 500 milliGRAM(s) Oral two times a day  methylPREDNISolone 8 milliGRAM(s) Oral daily  mexiletine 200 milliGRAM(s) Oral every 8 hours  montelukast 10 milliGRAM(s) Oral daily  senna 2 Tablet(s) Oral at bedtime  tiotropium 2.5 MICROgram(s) Inhaler 2 Puff(s) Inhalation daily    MEDICATIONS  (PRN):  dextrose Oral Gel 15 Gram(s) Oral once PRN Blood Glucose LESS THAN 70 milliGRAM(s)/deciliter  lactulose Syrup 10 Gram(s) Oral daily PRN constipation  ondansetron    Tablet 4 milliGRAM(s) Oral every 6 hours PRN for nausea    ALLERGIES:  Allergies    aspirin (Short breath)  Dilaudid (Short breath)  tetanus toxoid (Short breath)  Avelox (Short breath; Pruritus)  Valium (Short breath)  codeine (Short breath)  shellfish (Anaphylaxis)  cefepime (Anaphylaxis)  penicillin (Anaphylaxis)  iodine (Short breath; Swelling)    Intolerances    LABS:                        9.8    11.36 )-----------( 495      ( 27 Dec 2023 04:33 )             35.3     12-27    138  |  101  |  18.6  ----------------------------<  226<H>  4.5   |  26.0  |  0.58    Ca    8.7      27 Dec 2023 04:33  Phos  3.2     12-27  Mg     1.9     12-27    TPro  6.1<L>  /  Alb  2.6<L>  /  TBili  <0.2<L>  /  DBili  x   /  AST  14  /  ALT  10  /  AlkPhos  76  12-27    Urinalysis Basic - ( 27 Dec 2023 04:33 )    Color: x / Appearance: x / SG: x / pH: x  Gluc: 226 mg/dL / Ketone: x  / Bili: x / Urobili: x   Blood: x / Protein: x / Nitrite: x   Leuk Esterase: x / RBC: x / WBC x   Sq Epi: x / Non Sq Epi: x / Bacteria: x    Micro:    Culture - Blood (collected 12-25-23 @ 08:13)  Source: .Blood Blood  Preliminary Report (12-26-23 @ 15:01):    No growth at 24 hours    Culture - Blood (collected 12-25-23 @ 08:08)  Source: .Blood Blood  Preliminary Report (12-26-23 @ 15:01):    No growth at 24 hours    Culture - Blood (collected 12-24-23 @ 11:12)  Source: .Blood Blood-Peripheral  Preliminary Report (12-26-23 @ 18:01):    No growth at 48 Hours    Culture - Blood (collected 12-24-23 @ 11:09)  Source: .Blood Blood-Peripheral  Preliminary Report (12-26-23 @ 18:01):    No growth at 48 Hours    Culture - Blood (collected 12-21-23 @ 12:00)  Source: .Blood Blood-Peripheral  Gram Stain (12-24-23 @ 06:42):    Growth in anaerobic bottle: Gram Negative Rods  Final Report (12-26-23 @ 09:44):    Growth in anaerobic bottle: Proteus mirabilis ESBL    Direct identification is available within approximately 3-5    hours either by Blood Panel Multiplexed PCR or Direct    MALDI-TOF. Details: [email or web address]  Organism: Blood Culture PCR  Proteus mirabilis ESBL (12-26-23 @ 09:44)  Organism: Proteus mirabilis ESBL (12-26-23 @ 09:44)      Method Type: GARRETT      -  Ampicillin: R >16 These ampicillin results predict results for amoxicillin      -  Ampicillin/Sulbactam: R 8/4      -  Aztreonam: R >16      -  Cefazolin: R >16      -  Cefepime: R >16      -  Ceftriaxone: R >32      -  Ciprofloxacin: R >2      -  Ertapenem: S <=0.5      -  Gentamicin: R >8      -  Levofloxacin: R >4      -  Meropenem: S <=1      -  Piperacillin/Tazobactam: R <=8      -  Tobramycin: R >8      -  Trimethoprim/Sulfamethoxazole: R >2/38  Organism: Blood Culture PCR (12-26-23 @ 09:44)      Method Type: PCR      -  Proteus species: Detec      -  ESBL: Detec      -  CTX-M Resistance Marker: Detec    Culture - Blood (collected 12-21-23 @ 11:50)  Source: .Blood Blood-Peripheral  Final Report (12-26-23 @ 16:00):    No growth at 5 days    RADIOLOGY & ADDITIONAL TESTS: Reviewed.

## 2023-12-27 NOTE — PROGRESS NOTE ADULT - PROVIDER SPECIALTY LIST ADULT
Hospitalist
Infectious Disease
Internal Medicine
Infectious Disease
Infectious Disease
Internal Medicine
Podiatry
Infectious Disease

## 2023-12-27 NOTE — PHYSICAL THERAPY INITIAL EVALUATION ADULT - ADDITIONAL COMMENTS
pt states she lives in a 2nd floor apartment with her daughter with 4 steps to enter (+rail) then 14 to apartment (+rail). pt has a rollator for community and a shower chair. endorses furniture and wall-walking in her apartment. states her daughter works some days and is home others to assist.

## 2023-12-27 NOTE — HISTORY OF PRESENT ILLNESS
[FreeTextEntry1] : Here to update us on hospitalization in November for PNA and neurologic issues, and to check in overall.   [de-identified] : Wanted to update me on above.  Had a hospitalization 3 weeks ago for mucus plugging/PNA - sputum grew proteus/ESBL.  Got ertepenem course, improved clinically.  Has bronchiectasis with daily sputum production, difficulty expressing all her sputum.  Has cough, also has a reactive airway component to overall mixed obst/rest changes on PFTs, managed by Dr. Allison/pulm.  Asks for help with any ideas about her sputum.    Other item which occurred with last hospitalization is she felt 'off', couldn't say some of the words she wanted to for about a day.  Had scanning, which only found microvascular changes which were there in past; neuro saw her and had idea to do EEG.  On that there was a wave abnormality in L temporal lobe - resulted in addition of Keppra and neuro f/u outpatient - has seen Dr. Muñoz, to f/u.

## 2023-12-28 ENCOUNTER — TRANSCRIPTION ENCOUNTER (OUTPATIENT)
Age: 73
End: 2023-12-28

## 2023-12-28 VITALS — OXYGEN SATURATION: 100 %

## 2023-12-28 LAB
ALBUMIN SERPL ELPH-MCNC: 2.7 G/DL — LOW (ref 3.3–5.2)
ALBUMIN SERPL ELPH-MCNC: 2.7 G/DL — LOW (ref 3.3–5.2)
ALP SERPL-CCNC: 79 U/L — SIGNIFICANT CHANGE UP (ref 40–120)
ALP SERPL-CCNC: 79 U/L — SIGNIFICANT CHANGE UP (ref 40–120)
ALT FLD-CCNC: 10 U/L — SIGNIFICANT CHANGE UP
ALT FLD-CCNC: 10 U/L — SIGNIFICANT CHANGE UP
ANION GAP SERPL CALC-SCNC: 13 MMOL/L — SIGNIFICANT CHANGE UP (ref 5–17)
ANION GAP SERPL CALC-SCNC: 13 MMOL/L — SIGNIFICANT CHANGE UP (ref 5–17)
AST SERPL-CCNC: 14 U/L — SIGNIFICANT CHANGE UP
AST SERPL-CCNC: 14 U/L — SIGNIFICANT CHANGE UP
BASOPHILS # BLD AUTO: 0.04 K/UL — SIGNIFICANT CHANGE UP (ref 0–0.2)
BASOPHILS # BLD AUTO: 0.04 K/UL — SIGNIFICANT CHANGE UP (ref 0–0.2)
BASOPHILS NFR BLD AUTO: 0.4 % — SIGNIFICANT CHANGE UP (ref 0–2)
BASOPHILS NFR BLD AUTO: 0.4 % — SIGNIFICANT CHANGE UP (ref 0–2)
BILIRUB SERPL-MCNC: <0.2 MG/DL — LOW (ref 0.4–2)
BILIRUB SERPL-MCNC: <0.2 MG/DL — LOW (ref 0.4–2)
BUN SERPL-MCNC: 17.1 MG/DL — SIGNIFICANT CHANGE UP (ref 8–20)
BUN SERPL-MCNC: 17.1 MG/DL — SIGNIFICANT CHANGE UP (ref 8–20)
CALCIUM SERPL-MCNC: 8.7 MG/DL — SIGNIFICANT CHANGE UP (ref 8.4–10.5)
CALCIUM SERPL-MCNC: 8.7 MG/DL — SIGNIFICANT CHANGE UP (ref 8.4–10.5)
CHLORIDE SERPL-SCNC: 102 MMOL/L — SIGNIFICANT CHANGE UP (ref 96–108)
CHLORIDE SERPL-SCNC: 102 MMOL/L — SIGNIFICANT CHANGE UP (ref 96–108)
CO2 SERPL-SCNC: 25 MMOL/L — SIGNIFICANT CHANGE UP (ref 22–29)
CO2 SERPL-SCNC: 25 MMOL/L — SIGNIFICANT CHANGE UP (ref 22–29)
CREAT SERPL-MCNC: 0.67 MG/DL — SIGNIFICANT CHANGE UP (ref 0.5–1.3)
CREAT SERPL-MCNC: 0.67 MG/DL — SIGNIFICANT CHANGE UP (ref 0.5–1.3)
EGFR: 91 ML/MIN/1.73M2 — SIGNIFICANT CHANGE UP
EGFR: 91 ML/MIN/1.73M2 — SIGNIFICANT CHANGE UP
EOSINOPHIL # BLD AUTO: 0.14 K/UL — SIGNIFICANT CHANGE UP (ref 0–0.5)
EOSINOPHIL # BLD AUTO: 0.14 K/UL — SIGNIFICANT CHANGE UP (ref 0–0.5)
EOSINOPHIL NFR BLD AUTO: 1.3 % — SIGNIFICANT CHANGE UP (ref 0–6)
EOSINOPHIL NFR BLD AUTO: 1.3 % — SIGNIFICANT CHANGE UP (ref 0–6)
GLUCOSE BLDC GLUCOMTR-MCNC: 248 MG/DL — HIGH (ref 70–99)
GLUCOSE BLDC GLUCOMTR-MCNC: 248 MG/DL — HIGH (ref 70–99)
GLUCOSE BLDC GLUCOMTR-MCNC: 333 MG/DL — HIGH (ref 70–99)
GLUCOSE BLDC GLUCOMTR-MCNC: 333 MG/DL — HIGH (ref 70–99)
GLUCOSE BLDC GLUCOMTR-MCNC: 351 MG/DL — HIGH (ref 70–99)
GLUCOSE BLDC GLUCOMTR-MCNC: 351 MG/DL — HIGH (ref 70–99)
GLUCOSE SERPL-MCNC: 216 MG/DL — HIGH (ref 70–99)
GLUCOSE SERPL-MCNC: 216 MG/DL — HIGH (ref 70–99)
HCT VFR BLD CALC: 36 % — SIGNIFICANT CHANGE UP (ref 34.5–45)
HCT VFR BLD CALC: 36 % — SIGNIFICANT CHANGE UP (ref 34.5–45)
HGB BLD-MCNC: 10.4 G/DL — LOW (ref 11.5–15.5)
HGB BLD-MCNC: 10.4 G/DL — LOW (ref 11.5–15.5)
IMM GRANULOCYTES NFR BLD AUTO: 3.3 % — HIGH (ref 0–0.9)
IMM GRANULOCYTES NFR BLD AUTO: 3.3 % — HIGH (ref 0–0.9)
LYMPHOCYTES # BLD AUTO: 18.6 % — SIGNIFICANT CHANGE UP (ref 13–44)
LYMPHOCYTES # BLD AUTO: 18.6 % — SIGNIFICANT CHANGE UP (ref 13–44)
LYMPHOCYTES # BLD AUTO: 2.05 K/UL — SIGNIFICANT CHANGE UP (ref 1–3.3)
LYMPHOCYTES # BLD AUTO: 2.05 K/UL — SIGNIFICANT CHANGE UP (ref 1–3.3)
MAGNESIUM SERPL-MCNC: 2 MG/DL — SIGNIFICANT CHANGE UP (ref 1.6–2.6)
MAGNESIUM SERPL-MCNC: 2 MG/DL — SIGNIFICANT CHANGE UP (ref 1.6–2.6)
MCHC RBC-ENTMCNC: 18.9 PG — LOW (ref 27–34)
MCHC RBC-ENTMCNC: 18.9 PG — LOW (ref 27–34)
MCHC RBC-ENTMCNC: 28.9 GM/DL — LOW (ref 32–36)
MCHC RBC-ENTMCNC: 28.9 GM/DL — LOW (ref 32–36)
MCV RBC AUTO: 65.6 FL — LOW (ref 80–100)
MCV RBC AUTO: 65.6 FL — LOW (ref 80–100)
MONOCYTES # BLD AUTO: 1.09 K/UL — HIGH (ref 0–0.9)
MONOCYTES # BLD AUTO: 1.09 K/UL — HIGH (ref 0–0.9)
MONOCYTES NFR BLD AUTO: 9.9 % — SIGNIFICANT CHANGE UP (ref 2–14)
MONOCYTES NFR BLD AUTO: 9.9 % — SIGNIFICANT CHANGE UP (ref 2–14)
NEUTROPHILS # BLD AUTO: 7.37 K/UL — SIGNIFICANT CHANGE UP (ref 1.8–7.4)
NEUTROPHILS # BLD AUTO: 7.37 K/UL — SIGNIFICANT CHANGE UP (ref 1.8–7.4)
NEUTROPHILS NFR BLD AUTO: 66.5 % — SIGNIFICANT CHANGE UP (ref 43–77)
NEUTROPHILS NFR BLD AUTO: 66.5 % — SIGNIFICANT CHANGE UP (ref 43–77)
PHOSPHATE SERPL-MCNC: 3.1 MG/DL — SIGNIFICANT CHANGE UP (ref 2.4–4.7)
PHOSPHATE SERPL-MCNC: 3.1 MG/DL — SIGNIFICANT CHANGE UP (ref 2.4–4.7)
PLATELET # BLD AUTO: 568 K/UL — HIGH (ref 150–400)
PLATELET # BLD AUTO: 568 K/UL — HIGH (ref 150–400)
POTASSIUM SERPL-MCNC: 4.6 MMOL/L — SIGNIFICANT CHANGE UP (ref 3.5–5.3)
POTASSIUM SERPL-MCNC: 4.6 MMOL/L — SIGNIFICANT CHANGE UP (ref 3.5–5.3)
POTASSIUM SERPL-SCNC: 4.6 MMOL/L — SIGNIFICANT CHANGE UP (ref 3.5–5.3)
POTASSIUM SERPL-SCNC: 4.6 MMOL/L — SIGNIFICANT CHANGE UP (ref 3.5–5.3)
PROT SERPL-MCNC: 5.8 G/DL — LOW (ref 6.6–8.7)
PROT SERPL-MCNC: 5.8 G/DL — LOW (ref 6.6–8.7)
RBC # BLD: 5.49 M/UL — HIGH (ref 3.8–5.2)
RBC # BLD: 5.49 M/UL — HIGH (ref 3.8–5.2)
RBC # FLD: 22.9 % — HIGH (ref 10.3–14.5)
RBC # FLD: 22.9 % — HIGH (ref 10.3–14.5)
SODIUM SERPL-SCNC: 140 MMOL/L — SIGNIFICANT CHANGE UP (ref 135–145)
SODIUM SERPL-SCNC: 140 MMOL/L — SIGNIFICANT CHANGE UP (ref 135–145)
WBC # BLD: 11.05 K/UL — HIGH (ref 3.8–10.5)
WBC # BLD: 11.05 K/UL — HIGH (ref 3.8–10.5)
WBC # FLD AUTO: 11.05 K/UL — HIGH (ref 3.8–10.5)
WBC # FLD AUTO: 11.05 K/UL — HIGH (ref 3.8–10.5)

## 2023-12-28 PROCEDURE — 84145 PROCALCITONIN (PCT): CPT

## 2023-12-28 PROCEDURE — 83735 ASSAY OF MAGNESIUM: CPT

## 2023-12-28 PROCEDURE — 85610 PROTHROMBIN TIME: CPT

## 2023-12-28 PROCEDURE — 92610 EVALUATE SWALLOWING FUNCTION: CPT

## 2023-12-28 PROCEDURE — 99239 HOSP IP/OBS DSCHRG MGMT >30: CPT

## 2023-12-28 PROCEDURE — 71045 X-RAY EXAM CHEST 1 VIEW: CPT

## 2023-12-28 PROCEDURE — 80053 COMPREHEN METABOLIC PANEL: CPT

## 2023-12-28 PROCEDURE — 84466 ASSAY OF TRANSFERRIN: CPT

## 2023-12-28 PROCEDURE — 85027 COMPLETE CBC AUTOMATED: CPT

## 2023-12-28 PROCEDURE — 36415 COLL VENOUS BLD VENIPUNCTURE: CPT

## 2023-12-28 PROCEDURE — 87150 DNA/RNA AMPLIFIED PROBE: CPT

## 2023-12-28 PROCEDURE — 87077 CULTURE AEROBIC IDENTIFY: CPT

## 2023-12-28 PROCEDURE — 94640 AIRWAY INHALATION TREATMENT: CPT

## 2023-12-28 PROCEDURE — 85025 COMPLETE CBC W/AUTO DIFF WBC: CPT

## 2023-12-28 PROCEDURE — 99285 EMERGENCY DEPT VISIT HI MDM: CPT | Mod: 25

## 2023-12-28 PROCEDURE — 82962 GLUCOSE BLOOD TEST: CPT

## 2023-12-28 PROCEDURE — 87637 SARSCOV2&INF A&B&RSV AMP PRB: CPT

## 2023-12-28 PROCEDURE — 96375 TX/PRO/DX INJ NEW DRUG ADDON: CPT

## 2023-12-28 PROCEDURE — 83036 HEMOGLOBIN GLYCOSYLATED A1C: CPT

## 2023-12-28 PROCEDURE — 94760 N-INVAS EAR/PLS OXIMETRY 1: CPT

## 2023-12-28 PROCEDURE — 83605 ASSAY OF LACTIC ACID: CPT

## 2023-12-28 PROCEDURE — 73630 X-RAY EXAM OF FOOT: CPT

## 2023-12-28 PROCEDURE — 80048 BASIC METABOLIC PNL TOTAL CA: CPT

## 2023-12-28 PROCEDURE — 71250 CT THORAX DX C-: CPT | Mod: MA

## 2023-12-28 PROCEDURE — 85730 THROMBOPLASTIN TIME PARTIAL: CPT

## 2023-12-28 PROCEDURE — 87186 SC STD MICRODIL/AGAR DIL: CPT

## 2023-12-28 PROCEDURE — 87040 BLOOD CULTURE FOR BACTERIA: CPT

## 2023-12-28 PROCEDURE — 74177 CT ABD & PELVIS W/CONTRAST: CPT

## 2023-12-28 PROCEDURE — 74176 CT ABD & PELVIS W/O CONTRAST: CPT | Mod: MA

## 2023-12-28 PROCEDURE — 97163 PT EVAL HIGH COMPLEX 45 MIN: CPT

## 2023-12-28 PROCEDURE — 0225U NFCT DS DNA&RNA 21 SARSCOV2: CPT

## 2023-12-28 PROCEDURE — 82728 ASSAY OF FERRITIN: CPT

## 2023-12-28 PROCEDURE — 84100 ASSAY OF PHOSPHORUS: CPT

## 2023-12-28 PROCEDURE — 96365 THER/PROPH/DIAG IV INF INIT: CPT

## 2023-12-28 PROCEDURE — 83550 IRON BINDING TEST: CPT

## 2023-12-28 PROCEDURE — 36410 VNPNXR 3YR/> PHY/QHP DX/THER: CPT

## 2023-12-28 PROCEDURE — 93005 ELECTROCARDIOGRAM TRACING: CPT

## 2023-12-28 PROCEDURE — 83540 ASSAY OF IRON: CPT

## 2023-12-28 RX ORDER — ERTAPENEM SODIUM 1 G/1
1 INJECTION, POWDER, LYOPHILIZED, FOR SOLUTION INTRAMUSCULAR; INTRAVENOUS
Qty: 0 | Refills: 0 | DISCHARGE
Start: 2023-12-28 | End: 2024-01-04

## 2023-12-28 RX ORDER — INSULIN GLARGINE 100 [IU]/ML
18 INJECTION, SOLUTION SUBCUTANEOUS AT BEDTIME
Refills: 0 | Status: DISCONTINUED | OUTPATIENT
Start: 2023-12-28 | End: 2023-12-28

## 2023-12-28 RX ORDER — DIPHENHYDRAMINE HCL 50 MG
50 CAPSULE ORAL DAILY
Refills: 0 | Status: DISCONTINUED | OUTPATIENT
Start: 2023-12-28 | End: 2023-12-28

## 2023-12-28 RX ORDER — TIOTROPIUM BROMIDE 18 UG/1
2 CAPSULE ORAL; RESPIRATORY (INHALATION)
Qty: 0 | Refills: 0 | DISCHARGE
Start: 2023-12-28

## 2023-12-28 RX ORDER — ERTAPENEM SODIUM 1 G/1
1000 INJECTION, POWDER, LYOPHILIZED, FOR SOLUTION INTRAMUSCULAR; INTRAVENOUS EVERY 24 HOURS
Refills: 0 | Status: DISCONTINUED | OUTPATIENT
Start: 2023-12-28 | End: 2023-12-28

## 2023-12-28 RX ORDER — INSULIN LISPRO 100/ML
6 VIAL (ML) SUBCUTANEOUS
Refills: 0 | Status: DISCONTINUED | OUTPATIENT
Start: 2023-12-28 | End: 2023-12-28

## 2023-12-28 RX ORDER — DIPHENHYDRAMINE HCL 50 MG
1 CAPSULE ORAL
Qty: 0 | Refills: 0 | DISCHARGE
Start: 2023-12-28

## 2023-12-28 RX ORDER — ATORVASTATIN CALCIUM 80 MG/1
1 TABLET, FILM COATED ORAL
Qty: 0 | Refills: 0 | DISCHARGE
Start: 2023-12-28

## 2023-12-28 RX ADMIN — MEXILETINE HYDROCHLORIDE 200 MILLIGRAM(S): 150 CAPSULE ORAL at 14:28

## 2023-12-28 RX ADMIN — CLOPIDOGREL BISULFATE 75 MILLIGRAM(S): 75 TABLET, FILM COATED ORAL at 17:28

## 2023-12-28 RX ADMIN — Medication 6 UNIT(S): at 12:15

## 2023-12-28 RX ADMIN — GABAPENTIN 100 MILLIGRAM(S): 400 CAPSULE ORAL at 14:29

## 2023-12-28 RX ADMIN — Medication 8 MILLIGRAM(S): at 06:12

## 2023-12-28 RX ADMIN — ERTAPENEM SODIUM 120 MILLIGRAM(S): 1 INJECTION, POWDER, LYOPHILIZED, FOR SOLUTION INTRAMUSCULAR; INTRAVENOUS at 16:25

## 2023-12-28 RX ADMIN — Medication 325 MILLIGRAM(S): at 17:28

## 2023-12-28 RX ADMIN — Medication 3 MILLILITER(S): at 20:06

## 2023-12-28 RX ADMIN — Medication 1200 MILLIGRAM(S): at 06:11

## 2023-12-28 RX ADMIN — APIXABAN 5 MILLIGRAM(S): 2.5 TABLET, FILM COATED ORAL at 06:10

## 2023-12-28 RX ADMIN — TIOTROPIUM BROMIDE 2 PUFF(S): 18 CAPSULE ORAL; RESPIRATORY (INHALATION) at 08:22

## 2023-12-28 RX ADMIN — GABAPENTIN 100 MILLIGRAM(S): 400 CAPSULE ORAL at 06:11

## 2023-12-28 RX ADMIN — FAMOTIDINE 20 MILLIGRAM(S): 10 INJECTION INTRAVENOUS at 17:29

## 2023-12-28 RX ADMIN — Medication 6 UNIT(S): at 17:37

## 2023-12-28 RX ADMIN — Medication 50 MILLIGRAM(S): at 06:10

## 2023-12-28 RX ADMIN — Medication 5: at 12:15

## 2023-12-28 RX ADMIN — MONTELUKAST 10 MILLIGRAM(S): 4 TABLET, CHEWABLE ORAL at 17:28

## 2023-12-28 RX ADMIN — Medication 2: at 08:41

## 2023-12-28 RX ADMIN — Medication 5 UNIT(S): at 08:40

## 2023-12-28 RX ADMIN — LEVETIRACETAM 500 MILLIGRAM(S): 250 TABLET, FILM COATED ORAL at 17:28

## 2023-12-28 RX ADMIN — Medication 50 MILLIGRAM(S): at 15:54

## 2023-12-28 RX ADMIN — LEVETIRACETAM 500 MILLIGRAM(S): 250 TABLET, FILM COATED ORAL at 06:11

## 2023-12-28 RX ADMIN — MEXILETINE HYDROCHLORIDE 200 MILLIGRAM(S): 150 CAPSULE ORAL at 06:12

## 2023-12-28 RX ADMIN — Medication 3 MILLILITER(S): at 14:54

## 2023-12-28 RX ADMIN — ERTAPENEM SODIUM 120 MILLIGRAM(S): 1 INJECTION, POWDER, LYOPHILIZED, FOR SOLUTION INTRAMUSCULAR; INTRAVENOUS at 06:11

## 2023-12-28 RX ADMIN — Medication 3 MILLILITER(S): at 08:22

## 2023-12-28 RX ADMIN — APIXABAN 5 MILLIGRAM(S): 2.5 TABLET, FILM COATED ORAL at 17:28

## 2023-12-28 RX ADMIN — Medication 1200 MILLIGRAM(S): at 17:29

## 2023-12-28 RX ADMIN — Medication 4: at 17:37

## 2023-12-28 NOTE — DIETITIAN INITIAL EVALUATION ADULT - ORAL INTAKE PTA/DIET HISTORY
Pt reports decreased PO intake x 2 weeks PTA 2/2 not feeling well, has been drinking Glucerna at home to supplement poor PO intake. UBW per Pt 145lbs, admission weight 157lbs noted with 1+ generalized edema on admission likely inflating weight. Pt typically follows low Na and CHO diet at home, requesting Glucerna in house TID (dislikes chocolate) encouraged drinking supplement in between meals to maximize PO intake at meals. Aware constipation x4 days, bowel regimen initiated, continued to encourage adequate fluid intake.

## 2023-12-28 NOTE — DISCHARGE NOTE NURSING/CASE MANAGEMENT/SOCIAL WORK - NSDCCRNAME_GEN_ALL_CORE_FT
[facility]  [address] [place] [address]  Phone: [phone]  [facility]  [address] [place] [address]  Phone: [phone]

## 2023-12-28 NOTE — DIETITIAN INITIAL EVALUATION ADULT - ETIOLOGY
related to inability to meet sufficient protein-energy needs in setting of sepsis 2/2 PNA, constipation

## 2023-12-28 NOTE — DISCHARGE NOTE PROVIDER - NSDCFUADDAPPT_GEN_ALL_CORE_FT
Please see below for post hospital follow up information     1. VIVO Meds To Bed: [phone]/NA    2. Home Care:N/A    3. Transitional Care Services: [phone]    4, A. Primary Care Appointment:N/A    4, B. Specialty Appointment:Pulm – Appt w/ Dr. Hampton on 01/16 at 2:45. [facility]. If you are unable to attend your pre-scheduled appointment, please contact the office directly at [phone] to reschedule.    5. STAR Education Packet Provided   Please see below for post hospital follow up information     1. VIVO Meds To Bed: [phone]/NA    2. Home Care:N/A    3. Transitional Care Services: [phone]    4, A. Primary Care Appointment:N/A    4, B. Specialty Appointment:Pulm – Appt w/ Dr. Hampton on 01/16 at 2:45. [address] [facility]. If you are unable to attend your pre-scheduled appointment, please contact the office directly at [phone] to reschedule.    5. STAR Education Packet Provided

## 2023-12-28 NOTE — DISCHARGE NOTE PROVIDER - NPI NUMBER (FOR SYSADMIN USE ONLY) :
[[id number] [[id number] [[id number]],[[id number] [[id number]],[[id number]]

## 2023-12-28 NOTE — DISCHARGE NOTE PROVIDER - ATTENDING DISCHARGE PHYSICAL EXAMINATION:
no nausea/no blurred vision
VITALS:   T(C): 36.9 (12-28-23 @ 09:03), Max: 37.2 (12-27-23 @ 20:00)  HR: 74 (12-28-23 @ 14:57) (73 - 81)  BP: 128/73 (12-28-23 @ 09:03) (114/72 - 128/76)  RR: 18 (12-28-23 @ 09:03) (18 - 18)  SpO2: 100% (12-28-23 @ 14:57) (94% - 100%)    GENERAL: NAD, lying in bed comfortably  HEAD:  Atraumatic, Normocephalic  EYES: EOMI, PERRLA, conjunctiva and sclera clear  ENT: Moist mucous membranes  NECK: Supple, No JVD  CHEST/LUNG: Clear to auscultation bilaterally; No rales, rhonchi, wheezing, or rubs. Unlabored respirations  HEART: Regular rate and rhythm; No murmurs, rubs, or gallops  ABDOMEN: BSx4; Soft, nontender, nondistended  EXTREMITIES:  2+ Peripheral Pulses, brisk capillary refill. No clubbing, cyanosis, or edema  NERVOUS SYSTEM:  A&Ox3, no focal deficits   SKIN: No rashes or lesions  PSYCH: Normal affect, euthymic mood

## 2023-12-28 NOTE — DISCHARGE NOTE NURSING/CASE MANAGEMENT/SOCIAL WORK - NSDCFUADDAPPT_GEN_ALL_CORE_FT
Patient came up from SICU with NG tube and tube feedings ordered. Patient pulled out NG tube Patient post op laminectomy. Patient post op lumbar laminectomy. Hx of afib. Ordered for Q8H troponin level Please see below for post hospital follow up information     1. VIVO Meds To Bed: [phone]/NA    2. Home Care:N/A    3. Transitional Care Services: [phone]    4, A. Primary Care Appointment:N/A    4, B. Specialty Appointment:Pulm – Appt w/ Dr. Hampton on 01/16 at 2:45. 39 [facility]. If you are unable to attend your pre-scheduled appointment, please contact the office directly at [phone] to reschedule.    5. STAR Education Packet Provided   Please see below for post hospital follow up information     1. VIVO Meds To Bed: [phone]/NA    2. Home Care:N/A    3. Transitional Care Services: [phone]    4, A. Primary Care Appointment:N/A    4, B. Specialty Appointment:Pulm – Appt w/ Dr. Hampton on 01/16 at 2:45. 39 [facility]. If you are unable to attend your pre-scheduled appointment, please contact the office directly at [phone] to reschedule.    5. STAR Education Packet Provided

## 2023-12-28 NOTE — DISCHARGE NOTE PROVIDER - CARE PROVIDER_API CALL
Alberto Love  Podiatric Medicine and Surgery  [address] [place] [address]  Phone: [phone]  Fax: [phone]  Established Patient  Follow Up Time:     Eulalio Allison  Pulmonary Disease  [address]  [place] [address]  Phone: [phone]  Fax: [phone]  Follow Up Time:    Alberto Love  Podiatric Medicine and Surgery  [address] [place] [address]  Phone: [phone]  Fax: [phone]  Established Patient  Follow Up Time:     Eulalio Allison  Pulmonary Disease  [address]  [place] [address]  Phone: [phone]  Fax: [phone]  Follow Up Time:    Alberto Love  Podiatric Medicine and Surgery  [address] [place] [address]  Phone: [phone]  Fax: [phone]  Established Patient  Follow Up Time:     Eulalio Allison  Pulmonary Disease  [address]  [place] [address]  Phone: [phone]  Fax: [phone]  Follow Up Time:     Opal Da Silva  Infectious Disease  [address] [place] [address]  Phone: [phone]  Fax: [phone]  Follow Up Time: 2 weeks   Alberto Love  Podiatric Medicine and Surgery  [address] [place] [address]  Phone: [phone]  Fax: [phone]  Established Patient  Follow Up Time:     Eulalio Allison  Pulmonary Disease  [address]  [place] [address]  Phone: [phone]  Fax: [phone]  Follow Up Time:     Opal Da Silva  Infectious Disease  [address] [place] [address]  Phone: [phone]  Fax: [phone]  Follow Up Time: 2 weeks

## 2023-12-28 NOTE — DIETITIAN INITIAL EVALUATION ADULT - NSICDXPASTSURGICALHX_GEN_ALL_CORE_FT
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    H/O pelvic surgery 5 years ago - s/p fracture    H/O total knee replacement, bilateral 5 years ago    History of partial hysterectomy 30 years ago - fibroids    History of sinus surgery multiple sinus surgeries    History of tracheomalacia 2015 - attempted tracheal stenting ([facility])- course complicated by obstruction, respiratory failure, multiple CPR attempts -  stent discontinued; 10/20/2016 Tracheobronchoplasty (Prolene Mesh) performed at [facility] by Dr Zapien    Rectal bleeding exam under anesthesia ([facility]) 2/2018    S/P aortic valve replacement     S/P bronchoscopy 6/5/2018 - [place] (Dr Zapien) no evidence of tracheobronchomalacia in trachea or bronchial tubes    S/P TAVR (transcatheter aortic valve replacement)      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    H/O pelvic surgery 5 years ago - s/p fracture    H/O total knee replacement, bilateral 5 years ago    History of partial hysterectomy 30 years ago - fibroids    History of sinus surgery multiple sinus surgeries    History of tracheomalacia 2015 - attempted tracheal stenting ([facility])- course complicated by obstruction, respiratory failure, multiple CPR attempts -  stent discontinued; 10/20/2016 Tracheobronchoplasty (Prolene Mesh) performed at [facility] by Dr Zapien    Rectal bleeding exam under anesthesia ([facility]) 2/2018    S/P aortic valve replacement     S/P bronchoscopy 6/5/2018 - [place] (Dr Zapien) no evidence of tracheobronchomalacia in trachea or bronchial tubes    S/P TAVR (transcatheter aortic valve replacement)

## 2023-12-28 NOTE — DIETITIAN INITIAL EVALUATION ADULT - SIGNS/SYMPTOMS
as evidenced by meeting <75% EER >7d, mild/mod muscle/fat loss,likely weight loss masked 1+gen edema

## 2023-12-28 NOTE — DIETITIAN INITIAL EVALUATION ADULT - PERTINENT MEDS FT
MEDICATIONS  (STANDING):  apixaban 5 milliGRAM(s) Oral every 12 hours  atorvastatin 20 milliGRAM(s) Oral at bedtime  famotidine    Tablet 20 milliGRAM(s) Oral daily  ferrous    sulfate 325 milliGRAM(s) Oral daily  glucagon  Injectable 1 milliGRAM(s) IntraMuscular once  insulin glargine Injectable (LANTUS) 18 Unit(s) SubCutaneous at bedtime  insulin lispro Injectable (ADMELOG) 6 Unit(s) SubCutaneous three times a day before meals  levETIRAcetam 500 milliGRAM(s) Oral two times a day  methylPREDNISolone 8 milliGRAM(s) Oral daily  mexiletine 200 milliGRAM(s) Oral every 8 hours  senna 2 Tablet(s) Oral at bedtime

## 2023-12-28 NOTE — DISCHARGE NOTE NURSING/CASE MANAGEMENT/SOCIAL WORK - PATIENT PORTAL LINK FT
You can access the FollowMyHealth Patient Portal offered by [facility] by registering at the following website: [email or web address]. By joining [email or web address]’s FollowMyHealth portal, you will also be able to view your health information using other applications (apps) compatible with our system. You can access the FollowMyHealth Patient Portal offered by [facility] by registering at the following website: [email or web address]. By joining [employer]’s FollowMyHealth portal, you will also be able to view your health information using other applications (apps) compatible with our system.

## 2023-12-28 NOTE — DISCHARGE NOTE PROVIDER - DETAILS OF MALNUTRITION DIAGNOSIS/DIAGNOSES
This patient has been assessed with a concern for Malnutrition and was treated during this hospitalization for the following Nutrition diagnosis/diagnoses:     -  12/28/2023: Moderate protein-calorie malnutrition

## 2023-12-28 NOTE — DIETITIAN INITIAL EVALUATION ADULT - PERTINENT LABORATORY DATA
12-28    140  |  102  |  17.1  ----------------------------<  216<H>  4.6   |  25.0  |  0.67    Ca    8.7      28 Dec 2023 04:30  Phos  3.1     12-28  Mg     2.0     12-28    TPro  5.8<L>  /  Alb  2.7<L>  /  TBili  <0.2<L>  /  DBili  x   /  AST  14  /  ALT  10  /  AlkPhos  79  12-28  POCT Blood Glucose.: 248 mg/dL (12-28-23 @ 08:39)  A1C with Estimated Average Glucose Result: 9.3 % (12-22-23 @ 04:49)  A1C with Estimated Average Glucose Result: 5.7 % (09-08-23 @ 06:39)  A1C with Estimated Average Glucose Result: 9.1 % (06-17-23 @ 10:27)

## 2023-12-28 NOTE — DISCHARGE NOTE NURSING/CASE MANAGEMENT/SOCIAL WORK - NSDCVIVACCINE_GEN_ALL_CORE_FT
COVID-19, mRNA, LNP-S, PF, 100 mcg/ 0.5 mL dose (Moderna); 06-Dec-2021 17:12; Afshan Lew (RADHA); [employer] [id number] (Exp. Date: 22-Dec-2021); IntraMuscular; Deltoid Left.; 0.25 milliLiter(s);    COVID-19, mRNA, LNP-S, PF, 100 mcg/ 0.5 mL dose (Moderna); 06-Dec-2021 17:12; Afshan Lew (RADHA); [employer] [id number] (Exp. Date: 22-Dec-2021); IntraMuscular; Deltoid Left.; 0.25 milliLiter(s);

## 2023-12-28 NOTE — DIETITIAN INITIAL EVALUATION ADULT - OTHER INFO
73-year-old female patient with a history of A-fib, COPD, TAVR,  tracheomalacia, aortic dissection, ostomy secondary to colorectal cancer, adrenal insufficiency, on steroids, presents to the ED complaining of nausea, vomiting and SOB.

## 2023-12-28 NOTE — DISCHARGE NOTE PROVIDER - HOSPITAL COURSE
73-year-old female with a history of A-fib, COPD, TAVR,  tracheomalacia, aortic dissection, ostomy secondary to colorectal cancer, adrenal insufficiency, on steroids, presents to the ED complaining of nausea, vomiting and SOB and was found to have sepsis due to multifocal pneumonia.  The patient was noted to be febrile, tachycardic with irregularly irregular rhythm with bilateral rales to the lower lobes. Laboratory findings revealed a white blood cell count of 24.77 and a positive sputum culture and blood culture for proteus mirabilis.  A computed tomography (CT) image of the chest revealed multifocal focal pneumonia. CT of her abdomen and pelvis was negative for any source of bacteremia.     During the hospital stay, the patient sepsis secondary to multifocal PNA and ESBL proteus bacteremia was treated with intravenous Ertapenem  and diphenhydramine 50mg  provided before each antibiotic dose daily. She received a midline and is scheduled to finish her antibiotic course on 1/4. Her COPD, was treated tiotropium 2.5 micrograms 2 puff daily, albuterol/ipratropium 3 mL every six, Mucinex 1200 oral every 12 hours, methylprednisolone 8 mg daily, montelukast 10 mg daily, Zofran 4mg every 6 hours. Her chronic afib was managed with eliquis 5 mg every 12 hours, mexiletine 200mg every 8 hours. Her coronary artery disease and hyperlipidemia was managed with plavix 75mg daily, atorvastatin 20mg at bedtime.  Her seizures were managed with levetiracetam 500mg twice a day.  Her type 2 diabetes was managed with lantus 16 units and admelog 5 units before each meal.  Neuropathic pain was managed with gabapentin 100mg every 8 hours.     The patient is medical stable and appropriate for discharge. She will not require chemotherapy or radiation during her stay in rehab.

## 2023-12-28 NOTE — DISCHARGE NOTE PROVIDER - NSDCMRMEDTOKEN_GEN_ALL_CORE_FT
Albuterol (Eqv-ProAir HFA) 90 mcg/inh inhalation aerosol: 2 puff(s) inhaled every 4 to 6 hours as needed for  shortness of breath and/or wheezing  ascorbic acid 500 mg oral tablet: 1 tab(s) orally once a day  atorvastatin 20 mg oral tablet: 1 tab(s) orally once a day (at bedtime)  Basaglar KwikPen 100 units/mL subcutaneous solution: 16 unit(s) subcutaneous once a day (at bedtime) and inject 6 units once a day in the morning  Breo Ellipta 200 mcg-25 mcg/inh inhalation powder: 1 puff(s) inhaled once a day  budesonide 0.5 mg/2 mL inhalation suspension: 2 milliliter(s) by nebulizer 2 times a day  clopidogrel 75 mg oral tablet: 1 tab(s) orally once a day  Crestor 5 mg oral tablet: 1 tab(s) orally once a day (at bedtime)  diphenhydrAMINE 50 mg oral capsule: 1 cap(s) orally once a day  Eliquis 5 mg oral tablet: 1 tab(s) orally 2 times a day  famotidine 20 mg oral tablet: 1 tab(s) orally once a day  gabapentin 100 mg oral capsule: 1 cap(s) orally every 8 hours  HumaLOG KwikPen 100 units/mL injectable solution: injectable 3 times a day Inject as per sliding scale  Keppra 500 mg oral tablet: 1 tab(s) orally 2 times a day  methylPREDNISolone 4 mg oral tablet: 7 tab(s) orally once a day Taper - Decrease by 4mg every 3 days until 8mg  mexiletine 200 mg oral capsule: 1 cap(s) orally every 8 hours  montelukast 10 mg oral tablet: 1 tab(s) orally once a day  Multiple Vitamins oral tablet: 1 tab(s) orally once a day  ondansetron 4 mg oral tablet: 1 tab(s) orally every 6 hours as needed for  nausea  pantoprazole 40 mg oral delayed release tablet: 1 tab(s) orally once a day  senna leaf extract oral tablet: 2 tab(s) orally once a day (at bedtime)  tiotropium 2.5 mcg/inh inhalation aerosol: 2 puff(s) inhaled once a day   Albuterol (Eqv-ProAir HFA) 90 mcg/inh inhalation aerosol: 2 puff(s) inhaled every 4 to 6 hours as needed for  shortness of breath and/or wheezing  ascorbic acid 500 mg oral tablet: 1 tab(s) orally once a day  atorvastatin 20 mg oral tablet: 1 tab(s) orally once a day (at bedtime)  Basaglar KwikPen 100 units/mL subcutaneous solution: 16 unit(s) subcutaneous once a day (at bedtime) and inject 6 units once a day in the morning  Breo Ellipta 200 mcg-25 mcg/inh inhalation powder: 1 puff(s) inhaled once a day  budesonide 0.5 mg/2 mL inhalation suspension: 2 milliliter(s) by nebulizer 2 times a day  clopidogrel 75 mg oral tablet: 1 tab(s) orally once a day  Crestor 5 mg oral tablet: 1 tab(s) orally once a day (at bedtime)  diphenhydrAMINE 50 mg oral capsule: 1 cap(s) orally once a day  Eliquis 5 mg oral tablet: 1 tab(s) orally 2 times a day  ertapenem 1 g injection: 1 gram(s) injectable once a day  famotidine 20 mg oral tablet: 1 tab(s) orally once a day  gabapentin 100 mg oral capsule: 1 cap(s) orally every 8 hours  HumaLOG KwikPen 100 units/mL injectable solution: injectable 3 times a day Inject as per sliding scale  Keppra 500 mg oral tablet: 1 tab(s) orally 2 times a day  methylPREDNISolone 4 mg oral tablet: 7 tab(s) orally once a day Taper - Decrease by 4mg every 3 days until 8mg  mexiletine 200 mg oral capsule: 1 cap(s) orally every 8 hours  montelukast 10 mg oral tablet: 1 tab(s) orally once a day  Multiple Vitamins oral tablet: 1 tab(s) orally once a day  ondansetron 4 mg oral tablet: 1 tab(s) orally every 6 hours as needed for  nausea  pantoprazole 40 mg oral delayed release tablet: 1 tab(s) orally once a day  senna leaf extract oral tablet: 2 tab(s) orally once a day (at bedtime)  tiotropium 2.5 mcg/inh inhalation aerosol: 2 puff(s) inhaled once a day   Albuterol (Eqv-ProAir HFA) 90 mcg/inh inhalation aerosol: 2 puff(s) inhaled every 4 to 6 hours as needed for  shortness of breath and/or wheezing  ascorbic acid 500 mg oral tablet: 1 tab(s) orally once a day  atorvastatin 20 mg oral tablet: 1 tab(s) orally once a day (at bedtime)  Basaglar KwikPen 100 units/mL subcutaneous solution: 16 unit(s) subcutaneous once a day (at bedtime) and inject 6 units once a day in the morning  Breo Ellipta 200 mcg-25 mcg/inh inhalation powder: 1 puff(s) inhaled once a day  budesonide 0.5 mg/2 mL inhalation suspension: 2 milliliter(s) by nebulizer 2 times a day  clopidogrel 75 mg oral tablet: 1 tab(s) orally once a day  Crestor 5 mg oral tablet: 1 tab(s) orally once a day (at bedtime)  diphenhydrAMINE 50 mg oral capsule: 1 cap(s) orally once a day  Eliquis 5 mg oral tablet: 1 tab(s) orally 2 times a day  ertapenem 1 g injection: 1 gram(s) injectable once a day  famotidine 20 mg oral tablet: 1 tab(s) orally once a day  gabapentin 100 mg oral capsule: 1 cap(s) orally every 8 hours  HumaLOG KwikPen 100 units/mL injectable solution: injectable 3 times a day Inject as per sliding scale  Keppra 500 mg oral tablet: 1 tab(s) orally 2 times a day  mexiletine 200 mg oral capsule: 1 cap(s) orally every 8 hours  montelukast 10 mg oral tablet: 1 tab(s) orally once a day  Multiple Vitamins oral tablet: 1 tab(s) orally once a day  ondansetron 4 mg oral tablet: 1 tab(s) orally every 6 hours as needed for  nausea  pantoprazole 40 mg oral delayed release tablet: 1 tab(s) orally once a day  senna leaf extract oral tablet: 2 tab(s) orally once a day (at bedtime)  tiotropium 2.5 mcg/inh inhalation aerosol: 2 puff(s) inhaled once a day

## 2023-12-28 NOTE — DISCHARGE NOTE PROVIDER - NSDCFUSCHEDAPPT_GEN_ALL_CORE_FT
[facility]  PULMMED [address]  Scheduled Appointment: 01/09/2024    [place]  [facility]  INTMED  [address]   Scheduled Appointment: 01/10/2024    Jovana Hampton  [facility]  PULMMED [address]  Scheduled Appointment: 01/16/2024    Rico Glover  [facility]  CARDIOLOGY [address]  Scheduled Appointment: 01/17/2024    Eulalio Allison  [facility]  PULMMED [address]  Scheduled Appointment: 01/29/2024    Rico Glover  [facility]  CARDIOLOGY [address]  Scheduled Appointment: 02/01/2024    Genesis Aragon  [facility]  ELECTROPH [address]  Scheduled Appointment: 02/01/2024    Emmy Horner  [facility]  NEUROLOGY [address]  Scheduled Appointment: 02/07/2024    Eulalio Allison  [facility]  PULMMED [address]  Scheduled Appointment: 03/19/2024     [facility]  PULMMED [address]  Scheduled Appointment: 01/09/2024    [place]  [facility]  INTMED  [address]   Scheduled Appointment: 01/10/2024    Jovana Hampton  [facility]  PULMMED [address]  Scheduled Appointment: 01/16/2024    Rico Glover  [facility]  CARDIOLOGY [address]  Scheduled Appointment: 01/17/2024    Eulalio Allison  [facility]  PULMMED [address]  Scheduled Appointment: 01/29/2024    Rico Glover  [facility]  CARDIOLOGY [address]  Scheduled Appointment: 02/01/2024    Genesis Aragon  [facility]  ELECTROPH [address]  Scheduled Appointment: 02/01/2024    Emmy Horner  [facility]  NEUROLOGY [address]  Scheduled Appointment: 02/07/2024    Eulalio Allison  [facility]  PULMMED [address]  Scheduled Appointment: 03/19/2024

## 2023-12-28 NOTE — PROCEDURE NOTE - ADDITIONAL PROCEDURE DETAILS
4FR 15CM 28CIRC BARD POWER MIDLINE good flash ns flush left brachial vein
#18G 10CM 32CIRC BARD POWER GLIDE MIDLINE inserted with ultrasound guidance.   Good flash, ns flush left brachial vein.   Patient tolerated well.

## 2023-12-28 NOTE — DIETITIAN INITIAL EVALUATION ADULT - ADD RECOMMEND
Add Glucerna TID per Pt request; Rx MVI and vit C 500mg daily, continue ferrous sulfate supplementation; Encourage HBV protein sources; Monitor weights daily for trend/accuracy

## 2023-12-28 NOTE — DISCHARGE NOTE PROVIDER - PROVIDER TOKENS
PROVIDER:[TOKEN:[[id number]],ESTABLISHEDPATIENT:[T]],PROVIDER:[TOKEN:[[id number]]] PROVIDER:[TOKEN:[[id number]],ESTABLISHEDPATIENT:[T]],PROVIDER:[TOKEN:[[id number]]] PROVIDER:[TOKEN:[[id number]],ESTABLISHEDPATIENT:[T]],PROVIDER:[TOKEN:[[id number]]],PROVIDER:[TOKEN:[[id number]],FOLLOWUP:[2 weeks]] PROVIDER:[TOKEN:[[id number]],ESTABLISHEDPATIENT:[T]],PROVIDER:[TOKEN:[[id number]]],PROVIDER:[TOKEN:[[id number]],FOLLOWUP:[2 weeks]]

## 2023-12-28 NOTE — DISCHARGE NOTE PROVIDER - NSDCCPCAREPLAN_GEN_ALL_CORE_FT
PRINCIPAL DISCHARGE DIAGNOSIS  Diagnosis: Multifocal pneumonia  Assessment and Plan of Treatment: Continue Ertapenem 1 gram and diphenhydramine 50 mg for 7 days.      SECONDARY DISCHARGE DIAGNOSES  Diagnosis: Sepsis due to pneumonia  Assessment and Plan of Treatment: Continue Ertapenem 1 gram and diphenhydramine 50 mg for 7 days    Diagnosis: COPD (chronic obstructive pulmonary disease)  Assessment and Plan of Treatment: Continue methylprednisolone 8 mg daily, montelukast 10mg daily, tiotropium 2.5 micrograms 2 puffs daily

## 2023-12-28 NOTE — DISCHARGE NOTE PROVIDER - CARE PROVIDERS DIRECT ADDRESSES
,[email or web address] ,[email or web address],DirectAddress_Unknown ,[email or web address],DirectAddress_Unknown

## 2023-12-28 NOTE — DISCHARGE NOTE NURSING/CASE MANAGEMENT/SOCIAL WORK - NSDCPEFALRISK_GEN_ALL_CORE
For information on Fall & Injury Prevention, visit: [email or web address]://www.[facility]/[email or web address]-[email or web address]-maintains-health-and-mobility OR  https://www.[facility]/news/fall-prevention-tips-[email or web address]-injury OR  https://www.cdc.gov/steadi/patient.html For information on Fall & Injury Prevention, visit: [email or web address]://www.[facility]/news/fall-prevention-protects-and-maintains-health-and-mobility OR  https://www.[facility]/news/fall-prevention-tips-to-avoid-injury OR  https://www.cdc.gov/steadi/patient.html

## 2023-12-29 ENCOUNTER — RX RENEWAL (OUTPATIENT)
Age: 73
End: 2023-12-29

## 2023-12-29 ENCOUNTER — APPOINTMENT (OUTPATIENT)
Dept: HEMATOLOGY ONCOLOGY | Facility: CLINIC | Age: 73
End: 2023-12-29

## 2023-12-29 LAB
CULTURE RESULTS: SIGNIFICANT CHANGE UP
SPECIMEN SOURCE: SIGNIFICANT CHANGE UP

## 2023-12-29 NOTE — PHYSICAL THERAPY INITIAL EVALUATION ADULT - SITTING BALANCE: STATIC
Pt presents to ED via POV with mother at bedside with c/o generalized body aches, HA, sore throat, cough x3 days. Pt also injured RIGHT thumb x3 weeks after jamming at football practice. No obvious deformity.    good balance

## 2023-12-30 LAB
CULTURE RESULTS: SIGNIFICANT CHANGE UP
SPECIMEN SOURCE: SIGNIFICANT CHANGE UP

## 2024-01-01 NOTE — PROGRESS NOTE BEHAVIORAL HEALTH - MUSCLE TONE / STRENGTH
- Continue Elecare   - baby foods stage 1 - at 6 months of age  - Follow up at 9 months of age      Dr.Gayathri Farhad MD  Pediatric gastroenterology  [facility]/ [place], [place]      Office contact number: [phone]  Outpatient lab Location: 3rd floor, [address]  Same day X ray: Please go to outpatient registration in ground floor for guidance  Scheduling Image: Please call [phone] to schedule any imaging      Normal muscle tone/strength

## 2024-01-02 NOTE — SWALLOW BEDSIDE ASSESSMENT ADULT - SWALLOW EVAL: RECOMMENDED FEEDING/EATING TECHNIQUES
intervention: [x] Yes  [] No        PLAN:  [x] Continue per plan of care [] Alter current plan (see comments)  [] Plan of care initiated [] Hold pending MD visit [] Discharge    Electronically signed by: Madeline Rashid, PT, DPT [id number]    Note: If patient does not return for scheduled/recommended follow up visits, this note will serve as a discharge from care along with the most recent update on progress.      slow rate/crush medication (when feasible)/maintain upright posture during/after eating for 30 mins/oral hygiene/small sips/bites

## 2024-01-09 ENCOUNTER — APPOINTMENT (OUTPATIENT)
Dept: PULMONOLOGY | Facility: CLINIC | Age: 74
End: 2024-01-09

## 2024-01-10 ENCOUNTER — APPOINTMENT (OUTPATIENT)
Dept: PULMONOLOGY | Facility: CLINIC | Age: 74
End: 2024-01-10

## 2024-01-10 ENCOUNTER — APPOINTMENT (OUTPATIENT)
Dept: INTERNAL MEDICINE | Facility: CLINIC | Age: 74
End: 2024-01-10

## 2024-01-15 ENCOUNTER — APPOINTMENT (OUTPATIENT)
Dept: INTERNAL MEDICINE | Facility: CLINIC | Age: 74
End: 2024-01-15
Payer: MEDICARE

## 2024-01-15 ENCOUNTER — APPOINTMENT (OUTPATIENT)
Dept: INTERNAL MEDICINE | Facility: CLINIC | Age: 74
End: 2024-01-15

## 2024-01-15 ENCOUNTER — LABORATORY RESULT (OUTPATIENT)
Age: 74
End: 2024-01-15

## 2024-01-15 VITALS
DIASTOLIC BLOOD PRESSURE: 81 MMHG | HEART RATE: 86 BPM | HEIGHT: 67 IN | WEIGHT: 145 LBS | SYSTOLIC BLOOD PRESSURE: 131 MMHG | RESPIRATION RATE: 16 BRPM | BODY MASS INDEX: 22.76 KG/M2 | TEMPERATURE: 97 F | OXYGEN SATURATION: 96 %

## 2024-01-15 DIAGNOSIS — A49.8 OTHER BACTERIAL INFECTIONS OF UNSPECIFIED SITE: ICD-10-CM

## 2024-01-15 DIAGNOSIS — Z16.12 OTHER BACTERIAL INFECTIONS OF UNSPECIFIED SITE: ICD-10-CM

## 2024-01-15 DIAGNOSIS — J69.0 PNEUMONITIS DUE TO INHALATION OF FOOD AND VOMIT: ICD-10-CM

## 2024-01-15 PROCEDURE — 99215 OFFICE O/P EST HI 40 MIN: CPT | Mod: 25

## 2024-01-15 PROCEDURE — 36415 COLL VENOUS BLD VENIPUNCTURE: CPT

## 2024-01-15 NOTE — PHYSICAL EXAM
[General Appearance - Alert] : alert [General Appearance - In No Acute Distress] : in no acute distress [Sclera] : the sclera and conjunctiva were normal [Oropharynx] : the oropharynx was normal with no thrush [Neck Appearance] : the appearance of the neck was normal [Costovertebral Angle Tenderness] : no CVA tenderness [Exaggerated Use Of Accessory Muscles For Inspiration] : no accessory muscle use [Musculoskeletal - Swelling] : no joint swelling [] : no rash [Oriented To Time, Place, And Person] : oriented to person, place, and time [Affect] : the affect was normal

## 2024-01-15 NOTE — ASSESSMENT
[FreeTextEntry1] : 73-year-old female here for ESBL Proteus bacteremia sepsis and pneumonia.  ESBL Proteus bacteremia/sepsis/pneumonia Aspiration pneumonia History of tracheomalacia and COPD  Recommendations: Patient not having any fevers chills or shortness of breath. Continues to cough with sputum production. Will check a CBC, CMP and procalcitonin level. Patient should have repeat CT scan of the chest early February 2024. Patient should follow-up with pulmonary. Reviewed paperwork from the nursing facility.  Try to call the nursing facility to obtain laboratory but was unsuccessful since no one picked up the phone on multiple attempts.  Follow-up in 3 to 4 weeks  Counseling included lab results, differential diagnosis, treatment options, risks and benefits, lifestyle changes, current condition, medications and dose adjustments. The patient was interactive attentive and asked questions and verbalized understanding.

## 2024-01-15 NOTE — REVIEW OF SYSTEMS
[Shortness Of Breath] : no shortness of breath [Wheezing] : no wheezing [Cough] : cough [SOB on Exertion] : no shortness of breath during exertion [Sputum] : coughing up ~M sputum [Pleuritic Chest Pain] : no pleuritic chest pain [Negative] : Heme/Lymph

## 2024-01-16 ENCOUNTER — APPOINTMENT (OUTPATIENT)
Dept: PULMONOLOGY | Facility: CLINIC | Age: 74
End: 2024-01-16

## 2024-01-16 LAB
ALBUMIN SERPL ELPH-MCNC: 4 G/DL
ALP BLD-CCNC: 124 U/L
ALT SERPL-CCNC: 8 U/L
ANION GAP SERPL CALC-SCNC: 13 MMOL/L
AST SERPL-CCNC: 12 U/L
BASOPHILS # BLD AUTO: 0 K/UL
BASOPHILS NFR BLD AUTO: 0 %
BILIRUB SERPL-MCNC: 0.2 MG/DL
BUN SERPL-MCNC: 13 MG/DL
CALCIUM SERPL-MCNC: 9.2 MG/DL
CHLORIDE SERPL-SCNC: 101 MMOL/L
CO2 SERPL-SCNC: 26 MMOL/L
CREAT SERPL-MCNC: 0.58 MG/DL
EGFR: 94 ML/MIN/1.73M2
EOSINOPHIL # BLD AUTO: 0.33 K/UL
EOSINOPHIL NFR BLD AUTO: 2.6 %
HCT VFR BLD CALC: 38.4 %
HGB BLD-MCNC: 10.6 G/DL
LYMPHOCYTES # BLD AUTO: 1.32 K/UL
LYMPHOCYTES NFR BLD AUTO: 10.4 %
MAN DIFF?: NORMAL
MCHC RBC-ENTMCNC: 18.3 PG
MCHC RBC-ENTMCNC: 27.6 GM/DL
MCV RBC AUTO: 66.3 FL
MONOCYTES # BLD AUTO: 0.99 K/UL
MONOCYTES NFR BLD AUTO: 7.8 %
NEUTROPHILS # BLD AUTO: 9.72 K/UL
NEUTROPHILS NFR BLD AUTO: 76.5 %
PLATELET # BLD AUTO: 279 K/UL
POTASSIUM SERPL-SCNC: 4.6 MMOL/L
PROCALCITONIN SERPL-MCNC: 0.07 NG/ML
PROT SERPL-MCNC: 6.4 G/DL
RBC # BLD: 5.79 M/UL
RBC # FLD: 24.6 %
SODIUM SERPL-SCNC: 139 MMOL/L
WBC # FLD AUTO: 12.7 K/UL

## 2024-01-17 ENCOUNTER — APPOINTMENT (OUTPATIENT)
Dept: CARDIOLOGY | Facility: CLINIC | Age: 74
End: 2024-01-17

## 2024-01-23 NOTE — PROGRESS NOTE ADULT - PROBLEM SELECTOR PLAN 6
anticipate throat clearing/change of breathing pattern/oral hygiene/position upright (90Y)/cough/gurgly voice/pneumonia Tosin mercado al

## 2024-01-25 NOTE — PROGRESS NOTE ADULT - PROBLEM SELECTOR PLAN 4
TIA in past, last was 5 yrs ago and presented as R sided weakness   - Cont Eliquis and atorvastatin 20 mg QHS   - Pt with ASA allergy
TIA in past, last was 5 yrs ago and presented as R sided weakness   - Cont Eliquis and atorvastatin 20 mg QHS   - Pt with ASA allergy
normal sinus rhythm

## 2024-01-29 ENCOUNTER — APPOINTMENT (OUTPATIENT)
Dept: PULMONOLOGY | Facility: CLINIC | Age: 74
End: 2024-01-29

## 2024-02-01 ENCOUNTER — APPOINTMENT (OUTPATIENT)
Dept: ELECTROPHYSIOLOGY | Facility: CLINIC | Age: 74
End: 2024-02-01

## 2024-02-01 ENCOUNTER — APPOINTMENT (OUTPATIENT)
Dept: CARDIOLOGY | Facility: CLINIC | Age: 74
End: 2024-02-01

## 2024-02-01 DIAGNOSIS — Z79.899 OTHER LONG TERM (CURRENT) DRUG THERAPY: ICD-10-CM

## 2024-02-01 NOTE — PROGRESS NOTE ADULT - ATTENDING COMMENTS
Niko Bhatia MD  Division of [facility] Medicine  Cell: [phone]  Pager: [phone]  Office: [phone] PERRL/normal

## 2024-02-05 NOTE — ED PROVIDER NOTE - CADM POA VASCULAR ACCESS TYPE
Quality 431: Preventive Care And Screening: Unhealthy Alcohol Use - Screening: Patient not identified as an unhealthy alcohol user when screened for unhealthy alcohol use using a systematic screening method
Detail Level: Detailed
Quality 226: Preventive Care And Screening: Tobacco Use: Screening And Cessation Intervention: Patient screened for tobacco use and is an ex/non-smoker
femoral

## 2024-02-10 NOTE — PROGRESS NOTE ADULT - ASSESSMENT
Pt transported to CT scan via stretcher.   66 yo woman with PMH of COPD, ashtma on chronic steroids, tracheobronchomalacia s/p bronchial thermoplasty (10/16), Afib on Eliquis, HTN, adrenal insufficiency, T2DM, DVT, SCC anus (dx 2016, s/p chemo rad s/p APR 7/24/18) presenting from rehabilitation with fevers most likely 2/2 UTI and bacterial PNA 66 yo woman with PMH of COPD, ashtma on chronic steroids, tracheobronchomalacia s/p bronchial thermoplasty (10/16), Afib on Eliquis, HTN, adrenal insufficiency, T2DM, DVT, SCC anus (dx 2016, s/p chemo rad s/p APR 7/24/18) presenting from rehabilitation with fevers most likely 2/2  bacterial PNA

## 2024-02-13 ENCOUNTER — APPOINTMENT (OUTPATIENT)
Dept: INTERNAL MEDICINE | Facility: CLINIC | Age: 74
End: 2024-02-13

## 2024-02-22 NOTE — DISCHARGE NOTE NURSING/CASE MANAGEMENT/SOCIAL WORK - NSTRANSFERBELONGINGSRESP_GEN_A_NUR
Medication: Amlodipine 5 mg passed protocol.    Last office visit date: 5/16/23  Next appointment scheduled?: Yes   Number of refills given: 0 yes

## 2024-02-26 ENCOUNTER — APPOINTMENT (OUTPATIENT)
Dept: NEUROLOGY | Facility: CLINIC | Age: 74
End: 2024-02-26

## 2024-02-26 NOTE — ED ADULT NURSE NOTE - NSFALLRSKOUTCOME_ED_ALL_ED
Pt to UC with c/o a rash on his right arm that started Thursday night. Pt denies any known cause. Pt has been using aloe and monistat on the rash.    Universal Safety Interventions Fall Risk

## 2024-02-27 NOTE — DIETITIAN INITIAL EVALUATION ADULT - LOSS OF SUBCUTANEOUS FAT
For information on Fall & Injury Prevention, visit: [email or web address]/[email or web address]-[email or web address]-and-maintains-health-and-mobility OR  https://www.[facility]/news/fall-prevention-tips-to-avoid-injury OR  https://www.cdc.gov/steadi/patient.html
Orbital.../Buccal...

## 2024-03-19 ENCOUNTER — APPOINTMENT (OUTPATIENT)
Dept: PULMONOLOGY | Facility: CLINIC | Age: 74
End: 2024-03-19

## 2024-03-27 NOTE — DIETITIAN INITIAL EVALUATION ADULT - NSFNSGIIOFT_GEN_A_CORE
Unknown if ever smoked Denies N/V. Colostomy +, output today 05/12 denies abnormal consistency output. owel regimen:

## 2024-04-09 ENCOUNTER — APPOINTMENT (OUTPATIENT)
Dept: ENDOCRINOLOGY | Facility: CLINIC | Age: 74
End: 2024-04-09
Payer: MEDICARE

## 2024-04-09 VITALS
HEART RATE: 87 BPM | TEMPERATURE: 98.3 F | SYSTOLIC BLOOD PRESSURE: 120 MMHG | HEIGHT: 67 IN | OXYGEN SATURATION: 96 % | DIASTOLIC BLOOD PRESSURE: 73 MMHG

## 2024-04-09 DIAGNOSIS — E55.9 VITAMIN D DEFICIENCY, UNSPECIFIED: ICD-10-CM

## 2024-04-09 DIAGNOSIS — E11.9 TYPE 2 DIABETES MELLITUS W/OUT COMPLICATIONS: ICD-10-CM

## 2024-04-09 PROCEDURE — 99214 OFFICE O/P EST MOD 30 MIN: CPT

## 2024-04-10 ENCOUNTER — APPOINTMENT (OUTPATIENT)
Dept: NEUROLOGY | Facility: CLINIC | Age: 74
End: 2024-04-10
Payer: MEDICARE

## 2024-04-10 VITALS
HEART RATE: 76 BPM | HEIGHT: 67 IN | BODY MASS INDEX: 22.76 KG/M2 | SYSTOLIC BLOOD PRESSURE: 174 MMHG | WEIGHT: 145 LBS | DIASTOLIC BLOOD PRESSURE: 80 MMHG

## 2024-04-10 DIAGNOSIS — G40.119 LOCALIZATION-RELATED (FOCAL) (PARTIAL) SYMPTOMATIC EPILEPSY AND EPILEPTIC SYNDROMES WITH SIMPLE PARTIAL SEIZURES, INTRACTABLE, W/OUT STATUS EPILEPTICUS: ICD-10-CM

## 2024-04-10 PROCEDURE — 99215 OFFICE O/P EST HI 40 MIN: CPT

## 2024-04-10 RX ORDER — LEVETIRACETAM 750 MG/1
750 TABLET, EXTENDED RELEASE ORAL
Qty: 60 | Refills: 5 | Status: ACTIVE | COMMUNITY
Start: 2024-04-10 | End: 1900-01-01

## 2024-04-10 RX ORDER — BLOOD-GLUCOSE SENSOR
EACH MISCELLANEOUS
Qty: 6 | Refills: 3 | Status: ACTIVE | COMMUNITY
Start: 2023-10-26

## 2024-04-10 RX ORDER — EPINEPHRINE 0.3 MG/.3ML
0.3 INJECTION INTRAMUSCULAR
Qty: 1 | Refills: 0 | Status: ACTIVE | COMMUNITY
Start: 2022-08-26 | End: 1900-01-01

## 2024-04-10 RX ORDER — INSULIN GLARGINE 100 [IU]/ML
100 INJECTION, SOLUTION SUBCUTANEOUS
Qty: 3 | Refills: 1 | Status: ACTIVE | COMMUNITY
Start: 2023-11-27 | End: 1900-01-01

## 2024-04-10 RX ORDER — PEN NEEDLE, DIABETIC 32GX 5/32"
32G X 4 MM NEEDLE, DISPOSABLE MISCELLANEOUS
Qty: 400 | Refills: 3 | Status: ACTIVE | COMMUNITY
Start: 2023-05-23 | End: 1900-01-01

## 2024-04-11 ENCOUNTER — APPOINTMENT (OUTPATIENT)
Dept: PULMONOLOGY | Facility: CLINIC | Age: 74
End: 2024-04-11
Payer: MEDICARE

## 2024-04-11 VITALS
OXYGEN SATURATION: 96 % | RESPIRATION RATE: 16 BRPM | WEIGHT: 149 LBS | TEMPERATURE: 96.5 F | SYSTOLIC BLOOD PRESSURE: 162 MMHG | HEART RATE: 74 BPM | DIASTOLIC BLOOD PRESSURE: 80 MMHG | BODY MASS INDEX: 23.39 KG/M2 | HEIGHT: 67 IN

## 2024-04-11 DIAGNOSIS — J45.50 SEVERE PERSISTENT ASTHMA, UNCOMPLICATED: ICD-10-CM

## 2024-04-11 DIAGNOSIS — J44.9 CHRONIC OBSTRUCTIVE PULMONARY DISEASE, UNSPECIFIED: ICD-10-CM

## 2024-04-11 DIAGNOSIS — J39.8 OTHER SPECIFIED DISEASES OF UPPER RESPIRATORY TRACT: ICD-10-CM

## 2024-04-11 DIAGNOSIS — R93.89 ABNORMAL FINDINGS ON DIAGNOSTIC IMAGING OF OTHER SPECIFIED BODY STRUCTURES: ICD-10-CM

## 2024-04-11 DIAGNOSIS — K21.9 GASTRO-ESOPHAGEAL REFLUX DISEASE W/OUT ESOPHAGITIS: ICD-10-CM

## 2024-04-11 DIAGNOSIS — J30.9 ALLERGIC RHINITIS, UNSPECIFIED: ICD-10-CM

## 2024-04-11 PROCEDURE — 94010 BREATHING CAPACITY TEST: CPT

## 2024-04-11 PROCEDURE — 94727 GAS DIL/WSHOT DETER LNG VOL: CPT

## 2024-04-11 PROCEDURE — ZZZZZ: CPT

## 2024-04-11 PROCEDURE — 94729 DIFFUSING CAPACITY: CPT

## 2024-04-11 PROCEDURE — 95012 NITRIC OXIDE EXP GAS DETER: CPT

## 2024-04-11 PROCEDURE — 96372 THER/PROPH/DIAG INJ SC/IM: CPT

## 2024-04-11 PROCEDURE — 99214 OFFICE O/P EST MOD 30 MIN: CPT | Mod: 25

## 2024-04-11 PROCEDURE — G2211 COMPLEX E/M VISIT ADD ON: CPT

## 2024-04-11 RX ORDER — FLUTICASONE FUROATE AND VILANTEROL TRIFENATATE 200; 25 UG/1; UG/1
200-25 POWDER RESPIRATORY (INHALATION)
Qty: 3 | Refills: 1 | Status: ACTIVE | COMMUNITY
Start: 2023-08-08 | End: 1900-01-01

## 2024-04-11 RX ORDER — TEZEPELUMAB-EKKO 210 MG/1.9ML
210 INJECTION, SOLUTION SUBCUTANEOUS
Qty: 0 | Refills: 0 | Status: COMPLETED | OUTPATIENT
Start: 2024-04-11

## 2024-04-11 RX ADMIN — TEZEPELUMAB-EKKO 210 MG/1.91ML: 210 INJECTION, SOLUTION SUBCUTANEOUS at 00:00

## 2024-04-11 NOTE — HISTORY OF PRESENT ILLNESS
[FreeTextEntry1] : Ms. Bloom is a 73-year-old female with a history of abnormal CXR/chest CT, allergic rhinitis, severe persistent asthma, bronchiectasis, GERD, COPD, recurrent PNA, PND, s/p tracheoplasty, TBM, and SOB presenting to the office today for a follow-up pulmonary evaluation. Her chief complaint is  -she notes being in rehab 12/21-12/28 at [facility] and then to the [facility] for rehab from 12/28/2023-3/31/2024 -she notes her breathing is not good and has issues walking due to hx of seizures -she notes she was not walking at all during rehab -she notes she has a hx of seizures -she notes she previously developed a seizure focus which are still present -she notes NC on using her vest -she notes using albuterol and Budesonide -she notes having spasms from coughing too much -She notes GERD that is controlled -she notes having nose bleeds  she notes vision is stable but notes needing glasses.  -she notes her sense of smell and taste are abnormal -she notes sleep is intermittent. -she notes going to the hospital due to SOB  -she notes her memory and concentration are poor due to seizures -she notes she is immunocompromised  -she notes going to Dr Gilbert -she notes having shingles within the last 6 months and her MRSA came back -she notes using Incruze, albuterol, and budesonide -she notes having a productive shot   -Patient denies any headaches, nausea, vomiting, fever, chills, sweats, chest pain, chest pressure, palpitations, wheezing, fatigue, diarrhea, constipation, dysphagia, arthralgias, myalgias, dizziness, leg swelling, leg pain, itchy eyes, itchy ears, heartburn, reflux or sour taste in mouth.

## 2024-04-11 NOTE — ASSESSMENT
[FreeTextEntry1] : Ms. Bloom is a 73-year-old female with a history of mm, rectal CA, AVDz, asthma, allergy, GERD, TBM, s/p multiple pneumonias, s/p TAA repair/ sepsis/ PNA- mild sob/ mucus production c/w active severe persistent asthma, bronchiectasis - s/p Rx for Proteus infection- persistent mucus issue, pseudomonas, MASA- seizures (on Rx)- chronic mucus/ SOB.    Her chronic SOB which is multifactorial due to: - tracheomalacia (s/p tracheoplasty with residual frequent mucus production, though patient is non-compliant with vest therapy) - chronic bronchitis - asthma - allergic rhinitis - GERD - poor breathing mechanics - CAD/AV disease, arrhythmia, electrolyte issue  problem 1a: severe persistent asthma -(steroid dependent) - persistent sx  -Medrol taper 32/24/16/8 mg every 3 days - move to 8 mg / day (12/2023)- 4/2024 -recommended to use [employer] Medic Plus IMT (30 reps, 2X per day) -recommended to use the Breather -continue to use albuterol via the nebulizer QID or DuoNeb via nebulizer, Q6H -s/p Mucomyst QiD 2cc 10% q8h due to cough -followed by the acapella device/ chest vest therapy -(1st) followed by Perforomist via the nebulizer BID -(2nd) followed by Budesonide 0.5% via the nebulizer BID -continue to use Breo Ellipta 200 at 1 inhalation QD -continue to use Incruse Ellipta 1 puff QD -failed Xolair 225 injection; follow up injections every 2 weeks - Dupixent initiated (12.3.19) -off since 3/2022 - Initiate Tezspire 8/2022 (10/2023)- restart 4/2024 -continue to use Accolate 20 mg BID -Information sheet given about prednisone to the patient to be reviewed, this medication is never to be used without consulting the prescribing physician. Proper dietary restraint is necessary specifically salt containing foods, if any reaction may occur should be reported. -Asthma is believed to be caused by inherited (genetic) and environmental factor, but its exact cause is unknown. Asthma may be triggered by allergens, lung infections, or irritants in the air. Asthma triggers are different for each person -Inhaler technique reviewed as well as oral hygiene techniques reviewed with patient. Avoidance of cold air, extremes of temperature, rescue inhaler should be used before exercise. Order of medication reviewed with patient. Recommended use of a cool mist humidifier in the bedroom.  problem 2: tracheomalacia, residual bronchomalacia -s/p tracheoplasty with Dr. Mt Zapien -continue scopolamine patch 1 mg q72 hours -continue to follow up -s/p f/u Dynamic chest CT 10/2019 positive w TBM - repeat PRN -sleep on her stomach, with a pillow in the center of her chest -Tracheomalacia is usually acquired in adults and common causes include damage by tracheostomy or endotracheal intubation damaging the tracheal cartilage with increase risk with multiple intubations, prolonged intubation, and concurrent high dose steroid therapy; external chest wall trauma and surgery; chronic compression of the trachea by benign etiologies (eg, benign mediastinal goiter) or malignancy; relapsing polychondritis; or recurrent infection. Tracheomalacia can be asymptomatic, however signs or symptoms can develop as the severity of the airway narrowing progresses with major symptoms include dyspnea, cough, and sputum retention. Other symptoms include severe paroxysms of coughing, wheezing or stridor, barking cough and may be exacerbated by forced expiration, cough, and Valsalva maneuver. Tracheomalacia is diagnosed by a bronchoscopic visualization of dynamic airway collapse on dynamic chest CT. Therapy is warranted in symptomatic patients with severe tracheomalacia and includes surgical repair as tracheobronchoplasty. The patient was referred to Dr. Valentino Nguyen or Dr. Abrahan Armando at [facility] for a surgical consult.  problem 3: chronic bronchitis and mucus clearance -continue to use acapella device multiple times daily -recommended to use chest vest therapy multiple times daily -she is being sent for sputum cultures -add Glycopyrrolate 1 mg q8H  Patient has a chronic cough greater than 6 months, tried and failed manual chest physiotherapy at home, no skilled caregiver available at home to perform manual CPT, tried and failed acapella vibratory physiotherapy, and recommended chest vest therapy  Problem 3A: Multiple infections ?Active (1/31/2022); s/p Proteus 10/2023 -off Tobramycin BID for 1 month (completed 12/20/19) -Complete follow up Sputum culture after completing ABx if needed. (resend);  formal ID evaluation (carie)  Problem 3B: multi myeloma -appointment on 1/28/2020 -R/o immune deficiency- IRIS to start device (4/2024) -Complete blood work to include: quantitative immunoglobulins, IgG subsets, strep pneumonia titers, T cell subsets -Due to the fact that this pt has had more infections than would be expected and immunological blood work is indicated this would include: IgG subclasses, quantitative immunoglobulins, Strep pneumoniae titers as well as Vitamin D levels. Based on this blood work we will be able to decide where the pt needs additional pneumococcal vaccine,  either Prevnar 13 or pneumovax. Immunology evaluation will also be potentially indicated.  problem 4: GERD -continue to use Protonix 40 mg before breakfast -continue Baclofen 10 mg q-meal -Rule of 2s: avoid eating too much, eating too late, eating too spicy, eating two hours before bed -Things to avoid including overeating, spicy foods, tight clothing, eating within three hours of bed, this list is not all inclusive. -For treatment of reflux, possible options discussed including diet control, H2 blockers, PPIs, as well as coating motility agents discussed as treatment options. Timing of meals and proximity of last meal to sleep were discussed. If symptoms persist, a formal gastrointestinal evaluation is needed.  problem 5: allergic rhinitis -continue to use nasal saline -continue to use Xyzal 5 mg before bed -Environmental measures for allergies were encouraged including mattress and pillow cover, air purifier, and environmental controls.  problem 6: hx of abnormal aortic valve / cardiac health - arrhythmia -continue to follow up with Dr. Leahy, AV Disease, AF -echocardiogram in June 2018 (Tosin); Kale Tristan for f/u, Ismail  problem 7: colon cancer -s/p radiation therapy, surgery -s/p to use chemotherapy follow up with Dr. King or Dr. Luong (CT as per oncology)  problem 8: poor breathing mechanics -Recommended Inés Ritter and Jessica breathing techniques -Proper breathing techniques were reviewed with an emphasis of exhalation. Patient instructed to breath in for 1 second and out for four seconds. Patient was encouraged to not talk while walking.  problem 9: abnormal chest CT- ? new nodule- likely inflammation - F/u PET/CT 4/2019- if changed Bx (Rupali); follow up and re-evaluate as per Rupali -questionable DIEUDONNE or HERMELINDO, all sputum is negative -CAT scans are the only radiological modality to identify abnormalities w/in the lungs with regards to nodules/masses/lymph nodes. Risks, benefits were reviewed in detail. The guidelines for abnormalities include follow up CT scans at various intervals which could range from 6 weeks to 1 year intervals. If there is a change for the worse then consideration for a biopsy will be considered if you are a candidate. Second opinion evaluation with a thoracic surgeon or an interventional radiologist could be offered.  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 It sometimes develops following a URI, herpes zoster outbreak in pharynx or thyroid or cervical spine injury. However, many patients have no identifiable antecedent explanation.  Problem 12: Health Maintenance/COVID19 Precautions -s/p Moderna COVID 19 vaccine x 3 - Clean your hands often. Wash your hands often with soap and water for at least 20 seconds, especially after blowing your nose, coughing, or sneezing, or having been in a public place. - If soap and water are not available, use a hand  that contains at least 60% alcohol. - To the extent possible, avoid touching high-touch surfaces in public places - elevator buttons, door handles, handrails, handshaking with people, etc. Use a tissue or your sleeve to cover your hand or finger if you must touch something. - Wash your hands after touching surfaces in public places. - Avoid touching your face, nose, eyes, etc. - Clean and disinfect your home to remove germs: practice routine cleaning of frequently touched surfaces (for example: tables, doorknobs, light switches, handles, desks, toilets, faucets, sinks & cell phones) - Avoid crowds, especially in poorly ventilated spaces. Your risk of exposure to respiratory viruses like COVID-19 may increase in crowded, closed-in settings with little air circulation if there are people in the crowd who are sick. All patients are recommended to practice social distancing and stay at least 6 feet away from others. - Avoid all non-essential travel including plane trips, and especially avoid embarking on cruise ships. -If COVID-19 is spreading in your community, take extra measures to put distance between yourself and other people to further reduce your risk of being exposed to this new virus. -Stay home as much as possible. - Consider ways of getting food brought to your house through family, social, or commercial networks -Be aware that the virus has been known to live in the air up to 3 hours post exposure, cardboard up to 24 hours post exposure, copper up to 4 hours post exposure, steel and plastic up to 2-3 days post exposure. Risk of transmission from these surfaces are affected by many variables. COVID-19 precautionary Immune Support Recommendations: -OTC Vitamin C 500mg BID -OTC Quercetin 250-500mg BID -OTC Zinc 75-100mg per day -OTC Melatonin 1 or 2mg a night -OTC Vitamin D 1-4000mg per day -OTC Tonic Water 8oz per day -Water 0.5-1 gallon per day Asthma and COVID19: You need to make sure your asthma is under control. This often requires the use of inhaled corticosteroids (and sometimes oral corticosteroids). Inhaled corticosteroids do not likely reduce your immune system's ability to fight infections, but oral corticosteroids may. It is important to use the steps above to protect yourself to limit your exposure to any respiratory virus.  problem 15: health maintenance -s/p flu shot 11/2023 -6/2021 Shingeles: Valacyclovir in progress -recommended strep pneumonia vaccines: Prevnar-13 vaccine, followed by Pneumo vaccine 23 one year following (completed) -recommended early intervention for URIs -recommended regular osteoporosis evaluations -recommended early dermatological evaluations -recommended after the age of 50 to the age of 70, colonoscopy every 5 years  F/P in 4-6 months SPI / NIOX She is encouraged to call with any changes, concerns, or questions.

## 2024-04-11 NOTE — PROCEDURE
[FreeTextEntry1] :  Full PFT reveals mild restrictive and obstructive dysfunction; FEV1 was 1.03 L which is 49 % of predicted, mild lung volumes, normal diffusions, at 15 L which is 79 % predicted, normal flow volume loop. PFT's for performed to evaluate for SOB and Asthma.   FENO was 9; a normal value being less than 25Fractional exhaled nitric oxide (FENO) is regarded as a simple, noninvasive method for assessing eosinophilic airway inflammation. Produced by a variety of cells within the lung, nitric oxide (NO) concentrations are generally low in healthy individuals. However, high concentrations of NO appear to be involved in nonspecific host defense mechanisms and chronic inflammatory diseases such as asthma. The American Thoracic Society (ATS) therefore has strongly recommended using FENO to aid in the assessment, management, and long-term monitoring of eosinophilic airway inflammation and asthma, and for identifying steroid responsive individuals whose chronic respiratory symptoms may be caused by airway inflammation. In their 2011 clinical practice guideline, the ATS emphasizes the importance of using FENO.

## 2024-04-11 NOTE — ADDENDUM
[FreeTextEntry1] :  Documented by Chong Quevedo acting as a scribe for Dr. Eulalio Allison on 04/11/2024 .   All medical record entries made by the Scribe were at my, Dr. Eulalio Allison's direction and personally dictated by me on 04/11/2024 . I have reviewed the chart and agree that the record accurately reflects my personal performance of the history, Physical exam, assessment, and plan. I have also personally directed, reviewed, and agree with the discharge instructions.

## 2024-04-11 NOTE — PHYSICAL EXAM
[No Acute Distress] : no acute distress [Normal Oropharynx] : normal oropharynx [II] : Mallampati Class: II [Normal Appearance] : normal appearance [No Neck Mass] : no neck mass [Normal Rate/Rhythm] : normal rate/rhythm [Normal S1, S2] : normal s1, s2 [No Resp Distress] : no resp distress [No Abnormalities] : no abnormalities [Kyphosis] : kyphosis [Benign] : benign [Normal Gait] : normal gait [No Clubbing] : no clubbing [No Cyanosis] : no cyanosis [FROM] : FROM [Normal Color/ Pigmentation] : normal color/ pigmentation [No Focal Deficits] : no focal deficits [Oriented x3] : oriented x3 [Normal Affect] : normal affect [TextBox_2] : PND you can see in the back of throat [TextBox_54] : 2/6 systolic murmur  [TextBox_68] : I:E 1:3; mild rhonchi which clear; no wheezing when she clears [TextBox_105] : trace edema in LE

## 2024-04-11 NOTE — REASON FOR VISIT
[Follow-Up] : a follow-up visit [Family Member] : family member [FreeTextEntry1] : abnormal CXR/chest CT, allergic rhinitis, severe persistent asthma, bronchiectasis, GERD, COPD, recurrent PNA, PND, s/p tracheoplasty, TBM, and SOB

## 2024-04-13 LAB — BACTERIA SPT CULT: ABNORMAL

## 2024-04-13 NOTE — ADDENDUM
[FreeTextEntry1] : This note was written by Pallavi Mares on 04/09/2024 acting as scribe for Dr. Moreno Panchal . I, Dr. Moreno Panchal, have read and attest that all the information, medical decision making and discharge instructions within are true and accurate.

## 2024-04-13 NOTE — HISTORY OF PRESENT ILLNESS
[FreeTextEntry1] : Ms. RAYRAY RODRIGUEZ is a 73-year-old female who returns with regard to a hx of type 2 dm. The diabetes has been present for about 10 years.   She denies any history of retinopathy or nephropathy. With regard to neuropathy, she does report that she feels like she is wearing shoes all the time in the left foot .   [facility] - she returned home last Friday.   While in rehab center, she was taking Basaglar Insulin 35 units am and 20 units HS along with Humalog 10 units before each meal. She does report episodes of low BG while in rehab center - had AM BGs in the 50s. She was only taking insulin when her BG was above 85. She states that Bgs were erratic while in rehab center. She was not eating regularly. Since being home, she is eating 2-3 meals daily along with fruits as a snack in between meals.  She has not taken any insulin since being discharged from rehab center last week.   Patient refused POCT glucose and A1c today.   She is pending f/u with Dr. Allison tomorrow.   She denies polyuria, polydipsia, or any visual changes. She too denies any skin lesions, skin breakdown or non-healing areas of skin. She too denies any podiatric concerns. Ophthalmologic evaluation is up to date.  s/p TAVR on 09/06/2023,     Additional medical history includes that of recurrent pneumonia, Multiple Myeloma, hypertension, Hyperlipidemia, Aortic insufficiency-s/p TAVR, PAF, asthma, and hx of Tracheoplasty along with hx of Squamous cell Ca of Anal canal.  Too with hx of vitamin d deficiency, bronchiectasis   Current Medications: Warfarin, Plavix, gabapentin, Mexiletine, Pantoprazole, Rosuvastatin 5 mg and medrol 8 mg daily Social history: Denies smoking or drinking.

## 2024-04-15 RX ORDER — UMECLIDINIUM 62.5 UG/1
62.5 AEROSOL, POWDER ORAL DAILY
Qty: 1 | Refills: 3 | Status: ACTIVE | COMMUNITY
Start: 1900-01-01 | End: 1900-01-01

## 2024-04-16 ENCOUNTER — OUTPATIENT (OUTPATIENT)
Dept: OUTPATIENT SERVICES | Facility: HOSPITAL | Age: 74
LOS: 1 days | End: 2024-04-16
Payer: MEDICARE

## 2024-04-16 ENCOUNTER — APPOINTMENT (OUTPATIENT)
Dept: INTERNAL MEDICINE | Facility: CLINIC | Age: 74
End: 2024-04-16
Payer: MEDICARE

## 2024-04-16 VITALS
OXYGEN SATURATION: 96 % | HEART RATE: 93 BPM | WEIGHT: 150 LBS | BODY MASS INDEX: 23.54 KG/M2 | HEIGHT: 67 IN | DIASTOLIC BLOOD PRESSURE: 80 MMHG | SYSTOLIC BLOOD PRESSURE: 130 MMHG

## 2024-04-16 DIAGNOSIS — H05.352 EXOSTOSIS OF LEFT ORBIT: Chronic | ICD-10-CM

## 2024-04-16 DIAGNOSIS — Z98.89 OTHER SPECIFIED POSTPROCEDURAL STATES: Chronic | ICD-10-CM

## 2024-04-16 DIAGNOSIS — R47.89 OTHER SPEECH DISTURBANCES: ICD-10-CM

## 2024-04-16 DIAGNOSIS — R26.81 UNSTEADINESS ON FEET: ICD-10-CM

## 2024-04-16 DIAGNOSIS — K62.5 HEMORRHAGE OF ANUS AND RECTUM: Chronic | ICD-10-CM

## 2024-04-16 DIAGNOSIS — I48.91 UNSPECIFIED ATRIAL FIBRILLATION: ICD-10-CM

## 2024-04-16 DIAGNOSIS — I10 ESSENTIAL (PRIMARY) HYPERTENSION: ICD-10-CM

## 2024-04-16 DIAGNOSIS — Z91.148 PATIENT'S OTHER NONCOMPLIANCE WITH MEDICATION REGIMEN FOR OTHER REASON: ICD-10-CM

## 2024-04-16 DIAGNOSIS — Z98.890 OTHER SPECIFIED POSTPROCEDURAL STATES: Chronic | ICD-10-CM

## 2024-04-16 DIAGNOSIS — Z95.2 PRESENCE OF PROSTHETIC HEART VALVE: Chronic | ICD-10-CM

## 2024-04-16 PROCEDURE — G0463: CPT

## 2024-04-16 PROCEDURE — 99214 OFFICE O/P EST MOD 30 MIN: CPT

## 2024-04-16 PROCEDURE — G2211 COMPLEX E/M VISIT ADD ON: CPT

## 2024-04-16 RX ORDER — LEVETIRACETAM 750 MG/1
750 TABLET, FILM COATED ORAL
Qty: 180 | Refills: 0 | Status: ACTIVE | COMMUNITY
Start: 2023-11-07

## 2024-04-18 ENCOUNTER — NON-APPOINTMENT (OUTPATIENT)
Age: 74
End: 2024-04-18

## 2024-04-19 ENCOUNTER — APPOINTMENT (OUTPATIENT)
Dept: INFECTIOUS DISEASE | Facility: CLINIC | Age: 74
End: 2024-04-19
Payer: MEDICARE

## 2024-04-19 PROBLEM — Z91.148 HISTORY OF MEDICATION NONCOMPLIANCE: Status: RESOLVED | Noted: 2018-10-22 | Resolved: 2024-04-19

## 2024-04-19 PROBLEM — R47.89 WORD FINDING DIFFICULTY: Status: ACTIVE | Noted: 2023-12-27

## 2024-04-19 PROBLEM — R26.81 GAIT INSTABILITY: Status: ACTIVE | Noted: 2023-06-22

## 2024-04-19 PROCEDURE — 99213 OFFICE O/P EST LOW 20 MIN: CPT

## 2024-04-19 NOTE — ASSESSMENT
[FreeTextEntry1] : 1) flu and covid bivalent vacc already this season.  2) has close pulm f/u for asthma/copd/bronchiectasis; on inhalers, at the moment breathing ok for her.  3) question of colonization vs infection at moment after rx of last PNA, to see ID this week.  4) neuro to assess further for temporal lobe activity.  PT for ambulatory strengthening, balance.  Will try to communicate w neuro and ?push EEG up if possible. 5) will try and help her get AI appt for gamma globulin consideration, has been seen by our division in past, can also try to find option near [place] where she lives.  6) DM overseen here by antonio.    34 minutes were spent in preparation of this visit, including review of previous notes and test results, interview and examination of the patient, communication with other care contributors, discussion of the plan, arranging for appropriate testing and treatment, and documentation.

## 2024-04-19 NOTE — HISTORY OF PRESENT ILLNESS
[FreeTextEntry1] : Here for f/u of bronchiectasis, frequent PNAs likely colonized as well, a fib on AC, s/p recent TAVR, HTN, DM, IgA MGUS, ?temporal lobe epiepsy/?vascular dz  related to word finding difficulty and gait difficulty now s/p admission for PNA and another rehab period. [de-identified] : Here for f/u of above.  Since her d/c from rehab, says her breathing has been reasonable along with her secretions.  Using vibratory vest to assist.  Saw Dr. Allison/pulm, had repeat surveillance cultures which show a proteus that is only penam sensitive, to clear it would require IV antibiotics if felt necessary.  Possible this will represent colonization a lot of the time after courses of antibx.  To see Dr Stout/ID to get further input, set up by Dr. Allison.  Also has been found to be hypoIgG, silas IgG2, ?in face of IgA producing MGUS followed in hem.  Question is whether gamma globulin might help her do better avoiding lung infections.     Is also trying to get some clarity on any epieptic contibutors to her neuro syx, saw Dr. Horner, next ambultory EEG slot is 1-2 mos away, asking if any way can be pushed up.

## 2024-04-19 NOTE — REVIEW OF SYSTEMS
[Shortness Of Breath] : shortness of breath [Wheezing] : wheezing [Cough] : cough [Negative] : Genitourinary [Fever] : no fever [Chills] : no chills [Night Sweats] : no night sweats [Recent Change In Weight] : ~T no recent weight change [FreeTextEntry6] : see hpi

## 2024-04-19 NOTE — PHYSICAL EXAM
[No Acute Distress] : no acute distress [Well Nourished] : well nourished [Well Developed] : well developed [Well-Appearing] : well-appearing [No JVD] : no jugular venous distention [No Lymphadenopathy] : no lymphadenopathy [Supple] : supple [No Respiratory Distress] : no respiratory distress  [No Accessory Muscle Use] : no accessory muscle use [Normal Rate] : normal rate  [Regular Rhythm] : with a regular rhythm [Normal S1, S2] : normal S1 and S2 [No Carotid Bruits] : no carotid bruits [No Edema] : there was no peripheral edema [No Extremity Clubbing/Cyanosis] : no extremity clubbing/cyanosis [de-identified] : using walker; speaking full sentences, occ cough [de-identified] : scattered crackles that clear with cough; no wheeze appreciated

## 2024-04-22 NOTE — CONSULT LETTER
[FreeTextEntry2] : Dr. Eulalio Allison [FreeTextEntry3] : \par  Afshan Stout MD\par   of Medicine\par  Division of Infectious Diseases\par  [facility] [employer] [facility]\par  [address] [place] [address] [address]\[place] [address]\par  Tel: [phone]\par  Fax: [phone]

## 2024-04-22 NOTE — HISTORY OF PRESENT ILLNESS
[FreeTextEntry1] : 72 yo F presents today for abnormal sputum cultures. Not seen since 2/2022.  New info 4/19/24: Presents with daughter today.  Since last visit has been in and out of hospitals and rehab. Got home 4/1/24. Treated numerous times for ESBL proteus in sputum. Still is present though and she has mucus/congestion/cough. On keppra for new onset seizures as well. Follows with neuro.   In the past-  Sputum was collected and she grew Klebsiella and acinetobacter and chryseobacter.    Previous cultures had repeated pseudomonas.   Found to have pseudomonal infection on bronchoscopy 2/14/2020. Treated  IV aztreonam  for pseudomonas via port.    Also used to have chronic wheezing and shortness of breath.  Found to have PVCs and being treated for this. Wheezing has stopped.     PMH: Asthma (severe persistent, steroid dependent) Tracheobronchomalacia s/p tracheobronchoplasty 2018 Adrenal insufficiency - on steroids >50 years  Takes Medrol 12 mg am and 12  mg pm.  Diabetes Afib Aortic insufficiency Colorectoal Cancer 2017 s/p resection and chemo/xrt with diverting ostomy Pinched nerve in neck  MRSA boil on Right lower abdomen. 12/2019 PVC  She was referred to immunology b/c of recurrent infections.  T cell count is low.  Mumps protection weaned.  Found to have MGUS, IgA monoclonal gammopathy.      Pt also reports that she has been seen at [facility] >30 years ago .  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   Did take tobramycin for about one month for h/o pseudomonas.   7/2021 CT chest: Secretions w/in right and left mainstem bronchi.  CLusters of tiny nodules in the distal right middle lobe and right lower lobe are unchanged and c/w distal airway impaction.   New info 2/18/22 Called not feeling well. Sputum with MRSA. Given medrol. WBC checked and was high 17.  Given bactrim for MRSA but still feels sob. Tolerated cefidinir in the past. Checked prior meds. Spoke to dr. frazier. Recommended increased steroids back to 20 mg for 1 week. CT chest done. Was stable. Only mild worsening in opacities. No clear pneumonia.

## 2024-04-22 NOTE — ED PROVIDER NOTE - NSICDXPASTMEDICALHX_GEN_ALL_CORE_FT
[5] : 5 [de-identified] : 4/22/24: Had severe worsening pain last week, went to urgent care and had MRI done.  Previous doc: 4/9/24: 59F with LT knee pain x 1 week and low back pain x 3 weeks with s/s of sciatica. No specific injury/trauma. States she woke up last week and had medial sided pain and felt tight, also had sharp pain that radiated from the back to her knee. States it feels stiff when she walks and feels like it may give out. Pain with stairs. No pain at rest. Took advil with some relief. Ambulates without assistance. Pt is diabetic: last A1c- 7.1.   [FreeTextEntry5] :  Stated she went up two flight of stairs two weeks ago and caused her severe pain. Stated she was unable to walk. Went to  04/13/24, was given an MRI. Having medial pain.  PAST MEDICAL HISTORY:  Adrenal insufficiency Medrol daily for over 50 years    Aortic insufficiency moderate AR on echo 5/3/2018    Asthma     Atrial fibrillation paroxysmal, on eliquis    Colorectal cancer 4/2018- last treatment , chemo and radiation    COPD (chronic obstructive pulmonary disease)     Diabetes Type 2    DVT (deep venous thrombosis) 15-20 years ago, took coumadin    Pelvic fracture     Rectal bleeding     Seizure x 1 1/7/18    TIA (transient ischemic attack) multiple, last 5 years ago - presents as right-sided weakness    Tracheobronchomalacia diagnosed 2015, s/p bronchial thermoplasty 2016 (Dr Zapien); recent bronchoscopy 6/5/2018 revealed no evidence of tracheobronchomalacia in trachea or bronchial tubes

## 2024-04-22 NOTE — ASSESSMENT
[FreeTextEntry1] : This is a 72 yo F with extensive medical history notable for abnormal CT chest with nodules, persistent severe asthma steroid dependent, tracheobronchomalacia s/p tracheoplasty, AFIB on Eliquis,  who presented for recurrent respiratory infections in 1/2020   Last seen 2/2022 and infections have been recurrent, requiring numerous courses of antibiotics.  Sputum now colonized with ESBL Proteus.  No oral options for treatment.    Pt now returns for f/up. Still with chronic cough and mucus.    I prefer to check CT chest to see if there is any worsening. She has new seizures and i do not feel comfortable giving her a carbapenem at home as this could lower the seizure threshold.   I suspect she is colonized with proteus and it doesn't need to be treated today, but i informed her can cause infection intermittently.    She should continue with mucus clearing devices.   D/w Dr. Allison D/w pt and daughter.

## 2024-04-22 NOTE — PHYSICAL EXAM
[FreeTextEntry1] : port right anterior chest [Auscultation Breath Sounds / Voice Sounds] : lungs were clear to auscultation bilaterally [FreeTextEntry1] : Patient presents for annual physical. [de-identified] : Patient presents for annual physical.  She reports compliance with all prescribed medical therapy and dietary restrictions.  No complaints at present.

## 2024-04-25 ENCOUNTER — EMERGENCY (EMERGENCY)
Facility: HOSPITAL | Age: 74
LOS: 1 days | Discharge: HOME | End: 2024-04-25
Attending: STUDENT IN AN ORGANIZED HEALTH CARE EDUCATION/TRAINING PROGRAM
Payer: MEDICARE

## 2024-04-25 ENCOUNTER — NON-APPOINTMENT (OUTPATIENT)
Age: 74
End: 2024-04-25

## 2024-04-25 VITALS
RESPIRATION RATE: 20 BRPM | WEIGHT: 149.03 LBS | TEMPERATURE: 98 F | HEIGHT: 67 IN | SYSTOLIC BLOOD PRESSURE: 160 MMHG | HEART RATE: 81 BPM | OXYGEN SATURATION: 96 % | DIASTOLIC BLOOD PRESSURE: 110 MMHG

## 2024-04-25 DIAGNOSIS — H05.352 EXOSTOSIS OF LEFT ORBIT: Chronic | ICD-10-CM

## 2024-04-25 DIAGNOSIS — Z98.89 OTHER SPECIFIED POSTPROCEDURAL STATES: Chronic | ICD-10-CM

## 2024-04-25 DIAGNOSIS — Z95.2 PRESENCE OF PROSTHETIC HEART VALVE: Chronic | ICD-10-CM

## 2024-04-25 DIAGNOSIS — K62.5 HEMORRHAGE OF ANUS AND RECTUM: Chronic | ICD-10-CM

## 2024-04-25 DIAGNOSIS — Z96.653 PRESENCE OF ARTIFICIAL KNEE JOINT, BILATERAL: Chronic | ICD-10-CM

## 2024-04-25 DIAGNOSIS — Z87.09 PERSONAL HISTORY OF OTHER DISEASES OF THE RESPIRATORY SYSTEM: Chronic | ICD-10-CM

## 2024-04-25 DIAGNOSIS — Z98.890 OTHER SPECIFIED POSTPROCEDURAL STATES: Chronic | ICD-10-CM

## 2024-04-25 PROCEDURE — 99285 EMERGENCY DEPT VISIT HI MDM: CPT | Mod: GC

## 2024-04-25 NOTE — ED ADULT TRIAGE NOTE - CHIEF COMPLAINT QUOTE
Patient presents to ED via EMS with c/o general malaise.  Per patient, she has a (+) proteus infection that is not currently being treated.  Patient also with c/o right sided neck pain which she follow with physical therapy.  No pain medication PTA.  Patient also with c/o having HTN at home with no h/o per patient.

## 2024-04-25 NOTE — ED ADULT TRIAGE NOTE - PATIENT'S PREFERRED PRONOUN
Daily Call Note: Daily call complete.  Spoke w pt's wife, she states pt has been unusually tired today.  Reports good PO intake,  had eye appointment today and was falling asleep during appt.  Pt recently started on Synthroid, had repeat labs drawn today.  Pt continues w lightheadedness mostly in am.  Reviewed importance of safety and fall prevention, Spouse verbalized understanding.  B/p 96/61, 106/66, HR 88.  Spouse has call out to PCP, awaiting return call.      Pt Education:   Barriers:   Topics for Daily Review:   Pt demonstrates clear understanding: Yes    Daily Weight:  133 lbs   There were no vitals filed for this visit.   Last 3 Weights:  Wt Readings from Last 7 Encounters:   11/28/23 60.3 kg (133 lb)   11/27/23 59.9 kg (132 lb)   11/24/23 60.3 kg (133 lb)   11/20/23 63.7 kg (140 lb 8 oz)   11/17/23 64.9 kg (143 lb)   11/18/23 61.2 kg (135 lb)   11/16/23 65.8 kg (145 lb)       Masimo Device: No   Masimo Clinical Impression:     Virtual Visits--Scheduled (Most Recent Date at Top)  Follow up Appointments  Recent Visits  Date Type Provider Dept   11/17/23 Office Visit Mary Carmen Winters MD [id number]   Showing recent visits within past 30 days and meeting all other requirements  Future Appointments  No visits were found meeting these conditions.  Showing future appointments within next 90 days and meeting all other requirements       Frequency of RN Calls & Virtual Visits per Team Agreement: Healthy at Home Frequency: Bi-Weekly    Medication issues Addressed (what was done):   Follow up appointments scheduled by [facility] Staff:   Referrals made by [facility] staff:       [facility] Transitions Assessment    Patient's Address:   [address] [place] [id number]  **  If this is not the address patient will receive services - alert team and address in EMR**       Patient Contacts:  Extended Emergency Contact Information  Primary Emergency Contact: Chula Wright  Home Phone:  [phone]  Relation: Spouse  Secondary Emergency Contact: Marilyn Wright  Home Phone: [phone]  Relation: None                                Patient's Preferred Phone: [phone]  Patient's E-mail: [email or web address]                                       Her/She

## 2024-04-26 VITALS
RESPIRATION RATE: 18 BRPM | TEMPERATURE: 98 F | DIASTOLIC BLOOD PRESSURE: 82 MMHG | SYSTOLIC BLOOD PRESSURE: 153 MMHG | HEART RATE: 64 BPM | OXYGEN SATURATION: 99 %

## 2024-04-26 LAB
ALBUMIN SERPL ELPH-MCNC: 3.8 G/DL — SIGNIFICANT CHANGE UP (ref 3.3–5.2)
ALP SERPL-CCNC: 130 U/L — HIGH (ref 40–120)
ALT FLD-CCNC: 12 U/L — SIGNIFICANT CHANGE UP
ANION GAP SERPL CALC-SCNC: 15 MMOL/L — SIGNIFICANT CHANGE UP (ref 5–17)
ANISOCYTOSIS BLD QL: SLIGHT — SIGNIFICANT CHANGE UP
APPEARANCE UR: CLEAR — SIGNIFICANT CHANGE UP
APTT BLD: 38.6 SEC — HIGH (ref 24.5–35.6)
AST SERPL-CCNC: 17 U/L — SIGNIFICANT CHANGE UP
BACTERIA # UR AUTO: NEGATIVE /HPF — SIGNIFICANT CHANGE UP
BASOPHILS # BLD AUTO: 0.03 K/UL — SIGNIFICANT CHANGE UP (ref 0–0.2)
BASOPHILS NFR BLD AUTO: 0.3 % — SIGNIFICANT CHANGE UP (ref 0–2)
BILIRUB SERPL-MCNC: <0.2 MG/DL — LOW (ref 0.4–2)
BILIRUB UR-MCNC: NEGATIVE — SIGNIFICANT CHANGE UP
BUN SERPL-MCNC: 15.7 MG/DL — SIGNIFICANT CHANGE UP (ref 8–20)
CALCIUM SERPL-MCNC: 9.1 MG/DL — SIGNIFICANT CHANGE UP (ref 8.4–10.5)
CAST: 0 /LPF — SIGNIFICANT CHANGE UP (ref 0–4)
CHLORIDE SERPL-SCNC: 100 MMOL/L — SIGNIFICANT CHANGE UP (ref 96–108)
CO2 SERPL-SCNC: 24 MMOL/L — SIGNIFICANT CHANGE UP (ref 22–29)
COLOR SPEC: YELLOW — SIGNIFICANT CHANGE UP
CREAT SERPL-MCNC: 0.78 MG/DL — SIGNIFICANT CHANGE UP (ref 0.5–1.3)
DACRYOCYTES BLD QL SMEAR: SLIGHT — SIGNIFICANT CHANGE UP
DIFF PNL FLD: NEGATIVE — SIGNIFICANT CHANGE UP
EGFR: 79 ML/MIN/1.73M2 — SIGNIFICANT CHANGE UP
ELLIPTOCYTES BLD QL SMEAR: SLIGHT — SIGNIFICANT CHANGE UP
EOSINOPHIL # BLD AUTO: 0.1 K/UL — SIGNIFICANT CHANGE UP (ref 0–0.5)
EOSINOPHIL NFR BLD AUTO: 1.1 % — SIGNIFICANT CHANGE UP (ref 0–6)
GLUCOSE SERPL-MCNC: 208 MG/DL — HIGH (ref 70–99)
GLUCOSE UR QL: NEGATIVE MG/DL — SIGNIFICANT CHANGE UP
HCT VFR BLD CALC: 37.9 % — SIGNIFICANT CHANGE UP (ref 34.5–45)
HGB BLD-MCNC: 11.2 G/DL — LOW (ref 11.5–15.5)
IMM GRANULOCYTES NFR BLD AUTO: 0.4 % — SIGNIFICANT CHANGE UP (ref 0–0.9)
INR BLD: 1.11 RATIO — SIGNIFICANT CHANGE UP (ref 0.85–1.18)
KETONES UR-MCNC: NEGATIVE MG/DL — SIGNIFICANT CHANGE UP
LACTATE BLDV-MCNC: 2.5 MMOL/L — HIGH (ref 0.5–2)
LEUKOCYTE ESTERASE UR-ACNC: ABNORMAL
LYMPHOCYTES # BLD AUTO: 2.21 K/UL — SIGNIFICANT CHANGE UP (ref 1–3.3)
LYMPHOCYTES # BLD AUTO: 23.9 % — SIGNIFICANT CHANGE UP (ref 13–44)
MANUAL SMEAR VERIFICATION: SIGNIFICANT CHANGE UP
MCHC RBC-ENTMCNC: 19.9 PG — LOW (ref 27–34)
MCHC RBC-ENTMCNC: 29.6 GM/DL — LOW (ref 32–36)
MCV RBC AUTO: 67.2 FL — LOW (ref 80–100)
MICROCYTES BLD QL: SLIGHT — SIGNIFICANT CHANGE UP
MONOCYTES # BLD AUTO: 0.88 K/UL — SIGNIFICANT CHANGE UP (ref 0–0.9)
MONOCYTES NFR BLD AUTO: 9.5 % — SIGNIFICANT CHANGE UP (ref 2–14)
NEUTROPHILS # BLD AUTO: 5.97 K/UL — SIGNIFICANT CHANGE UP (ref 1.8–7.4)
NEUTROPHILS NFR BLD AUTO: 64.8 % — SIGNIFICANT CHANGE UP (ref 43–77)
NITRITE UR-MCNC: NEGATIVE — SIGNIFICANT CHANGE UP
OVALOCYTES BLD QL SMEAR: SLIGHT — SIGNIFICANT CHANGE UP
PH UR: 7 — SIGNIFICANT CHANGE UP (ref 5–8)
PLAT MORPH BLD: NORMAL — SIGNIFICANT CHANGE UP
PLATELET # BLD AUTO: 329 K/UL — SIGNIFICANT CHANGE UP (ref 150–400)
POIKILOCYTOSIS BLD QL AUTO: SIGNIFICANT CHANGE UP
POLYCHROMASIA BLD QL SMEAR: SLIGHT — SIGNIFICANT CHANGE UP
POTASSIUM SERPL-MCNC: 4.6 MMOL/L — SIGNIFICANT CHANGE UP (ref 3.5–5.3)
POTASSIUM SERPL-SCNC: 4.6 MMOL/L — SIGNIFICANT CHANGE UP (ref 3.5–5.3)
PROT SERPL-MCNC: 7.1 G/DL — SIGNIFICANT CHANGE UP (ref 6.6–8.7)
PROT UR-MCNC: NEGATIVE MG/DL — SIGNIFICANT CHANGE UP
PROTHROM AB SERPL-ACNC: 12.3 SEC — SIGNIFICANT CHANGE UP (ref 9.5–13)
RAPID RVP RESULT: SIGNIFICANT CHANGE UP
RBC # BLD: 5.64 M/UL — HIGH (ref 3.8–5.2)
RBC # FLD: 20.4 % — HIGH (ref 10.3–14.5)
RBC BLD AUTO: ABNORMAL
RBC CASTS # UR COMP ASSIST: 0 /HPF — SIGNIFICANT CHANGE UP (ref 0–4)
SARS-COV-2 RNA SPEC QL NAA+PROBE: SIGNIFICANT CHANGE UP
SODIUM SERPL-SCNC: 139 MMOL/L — SIGNIFICANT CHANGE UP (ref 135–145)
SP GR SPEC: 1.01 — SIGNIFICANT CHANGE UP (ref 1–1.03)
SQUAMOUS # UR AUTO: 2 /HPF — SIGNIFICANT CHANGE UP (ref 0–5)
TARGETS BLD QL SMEAR: SLIGHT — SIGNIFICANT CHANGE UP
UROBILINOGEN FLD QL: 0.2 MG/DL — SIGNIFICANT CHANGE UP (ref 0.2–1)
WBC # BLD: 9.23 K/UL — SIGNIFICANT CHANGE UP (ref 3.8–10.5)
WBC # FLD AUTO: 9.23 K/UL — SIGNIFICANT CHANGE UP (ref 3.8–10.5)
WBC UR QL: 2 /HPF — SIGNIFICANT CHANGE UP (ref 0–5)

## 2024-04-26 PROCEDURE — 81001 URINALYSIS AUTO W/SCOPE: CPT

## 2024-04-26 PROCEDURE — 96374 THER/PROPH/DIAG INJ IV PUSH: CPT

## 2024-04-26 PROCEDURE — 83605 ASSAY OF LACTIC ACID: CPT

## 2024-04-26 PROCEDURE — 80053 COMPREHEN METABOLIC PANEL: CPT

## 2024-04-26 PROCEDURE — 85610 PROTHROMBIN TIME: CPT

## 2024-04-26 PROCEDURE — 99215 OFFICE O/P EST HI 40 MIN: CPT

## 2024-04-26 PROCEDURE — 87040 BLOOD CULTURE FOR BACTERIA: CPT

## 2024-04-26 PROCEDURE — 87086 URINE CULTURE/COLONY COUNT: CPT

## 2024-04-26 PROCEDURE — 99205 OFFICE O/P NEW HI 60 MIN: CPT

## 2024-04-26 PROCEDURE — 36415 COLL VENOUS BLD VENIPUNCTURE: CPT

## 2024-04-26 PROCEDURE — 71045 X-RAY EXAM CHEST 1 VIEW: CPT | Mod: 26

## 2024-04-26 PROCEDURE — 0225U NFCT DS DNA&RNA 21 SARSCOV2: CPT

## 2024-04-26 PROCEDURE — 82962 GLUCOSE BLOOD TEST: CPT

## 2024-04-26 PROCEDURE — 85730 THROMBOPLASTIN TIME PARTIAL: CPT

## 2024-04-26 PROCEDURE — 99285 EMERGENCY DEPT VISIT HI MDM: CPT | Mod: 25

## 2024-04-26 PROCEDURE — 93005 ELECTROCARDIOGRAM TRACING: CPT

## 2024-04-26 PROCEDURE — 85025 COMPLETE CBC W/AUTO DIFF WBC: CPT

## 2024-04-26 PROCEDURE — 93010 ELECTROCARDIOGRAM REPORT: CPT

## 2024-04-26 PROCEDURE — 71045 X-RAY EXAM CHEST 1 VIEW: CPT

## 2024-04-26 RX ORDER — AZITHROMYCIN 500 MG/1
500 TABLET, FILM COATED ORAL ONCE
Refills: 0 | Status: COMPLETED | OUTPATIENT
Start: 2024-04-26 | End: 2024-04-26

## 2024-04-26 RX ORDER — AZITHROMYCIN 500 MG/1
1 TABLET, FILM COATED ORAL
Qty: 1 | Refills: 0
Start: 2024-04-26 | End: 2024-04-26

## 2024-04-26 RX ORDER — DIPHENHYDRAMINE HCL 50 MG
25 CAPSULE ORAL ONCE
Refills: 0 | Status: COMPLETED | OUTPATIENT
Start: 2024-04-26 | End: 2024-04-26

## 2024-04-26 RX ORDER — ALBUTEROL 90 UG/1
2 AEROSOL, METERED ORAL
Refills: 0 | DISCHARGE

## 2024-04-26 RX ORDER — INSULIN GLARGINE 100 [IU]/ML
6 INJECTION, SOLUTION SUBCUTANEOUS ONCE
Refills: 0 | Status: DISCONTINUED | OUTPATIENT
Start: 2024-04-26 | End: 2024-05-03

## 2024-04-26 RX ORDER — ERTAPENEM SODIUM 1 G/1
1000 INJECTION, POWDER, LYOPHILIZED, FOR SOLUTION INTRAMUSCULAR; INTRAVENOUS ONCE
Refills: 0 | Status: COMPLETED | OUTPATIENT
Start: 2024-04-26 | End: 2024-04-26

## 2024-04-26 RX ORDER — SODIUM CHLORIDE 9 MG/ML
1000 INJECTION INTRAMUSCULAR; INTRAVENOUS; SUBCUTANEOUS ONCE
Refills: 0 | Status: COMPLETED | OUTPATIENT
Start: 2024-04-26 | End: 2024-04-26

## 2024-04-26 RX ADMIN — AZITHROMYCIN 255 MILLIGRAM(S): 500 TABLET, FILM COATED ORAL at 02:58

## 2024-04-26 RX ADMIN — SODIUM CHLORIDE 1000 MILLILITER(S): 9 INJECTION INTRAMUSCULAR; INTRAVENOUS; SUBCUTANEOUS at 01:52

## 2024-04-26 NOTE — ED ADULT NURSE NOTE - NSFALLUNIVINTERV_ED_ALL_ED
James Bagley [address]  Janet    CONSENT FOR EXAMINATION AND DISPOSITION OF AMPUTATED MEMBERS      I have already consented to the amputation of my _______________________________ (Witness Signature/Date)     Patient Name: Janice Arana     : 1936                 Printed: 3/4/2018      Medical Record #: [id number] Bed/Stretcher in lowest position, wheels locked, appropriate side rails in place/Call bell, personal items and telephone in reach/Instruct patient to call for assistance before getting out of bed/chair/stretcher/Non-slip footwear applied when patient is off stretcher/[place] to call system/Physically safe environment - no spills, clutter or unnecessary equipment/Purposeful proactive rounding/Room/bathroom lighting operational, light cord in reach

## 2024-04-26 NOTE — ED PROVIDER NOTE - PROGRESS NOTE DETAILS
Pt w/ suspected pna on cxr. Pt advised to stay for iv abx but does not want to stay in the hospital. VSS with no hypoxia or labored breathing. no fever or significant leukocytosis. Pt w/ extensive allergies as well to cephalosporins, penicillin, quinolones, will start on azithro and advise close follow up with her specialists. -Malini Pt w/ suspected pna on cxr. Pt advised to stay for iv abx but does not want to stay in the hospital. VSS with no hypoxia or labored breathing. no fever or significant leukocytosis. Pt w/ extensive allergies as well to cephalosporins, penicillin, quinolones, will start on azithro IV and ertapenem, midline placement in the morning for outpatient IV abx, and advise close follow up with her specialists. -Malini Reviewed CXR imaging, radiology reading no focal consolidations. Pt remains appearing well, currently feels back to baseline. Pt remains refusing repeat lactate draw. Discussed case with ID and pt. Will discharge at this time with strict return precautions. Pt is aware that cultures are pending and if they return positive she will receive a call to return to the hospital for IV abx. Pt expressed understanding and was discharged in stable condition. Jesse Angel MD

## 2024-04-26 NOTE — CONSULT NOTE ADULT - SUBJECTIVE AND OBJECTIVE BOX
INFECTIOUS DISEASES AND INTERNAL MEDICINE at [place]  =======================================================  Choco Dowell MD  Diplomates American Board of Internal Medicine and Infectious Diseases  Telephone [phone]  Fax            [phone]  =======================================================    RAYRAY [id number]      HPI: 73-year-old female with a history of A-fib, COPD, TAVR,  tracheomalacia, aortic dissection, ostomy secondary to colorectal cancer, adrenal insufficiency, on steroids, presents to the ED w/ generalized malaise and nausea. Pt also reports that her bp has been higher than usual. She denies abd pain, vomiting, change in sputum, back pain, chest pain, SOB, headache, or any other complaint  PT REPORT SHE HAD POS SPUTUM CX WITH PROTEUS SHE FOLLOWS WITH PULMONOLOGIST AND ONCOLOGIST    INITIALLY  THERE WAS  CONCERN  FOR POSSIBLE PNEUMONIA  ASKED TO EVALUATE FROM ID STANDPOINT       PAST MEDICAL & SURGICAL HISTORY:  Atrial fibrillation  paroxysmal, on eliquis      Diabetes  Type 2      COPD (chronic obstructive pulmonary disease)      Adrenal insufficiency  Medrol daily for over 50 years      Aortic insufficiency  moderate AR on echo 5/3/2018      Pelvic fracture      Asthma      Tracheobronchomalacia  diagnosed 2015, s/p bronchial thermoplasty 2016 (Dr Zapien); recent bronchoscopy 6/5/2018 revealed no evidence of tracheobronchomalacia in trachea or bronchial tubes      Colorectal cancer  4/2018- last treatment , chemo and radiation      Rectal bleeding      Seizure  x 1 1/7/18      DVT (deep venous thrombosis)  15-20 years ago, took coumadin      TIA (transient ischemic attack)  multiple, last 5 years ago - presents as right-sided weakness      History of partial hysterectomy  30 years ago - fibroids      H/O total knee replacement, bilateral  5 years ago      History of sinus surgery  multiple sinus surgeries      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      H/O pelvic surgery  5 years ago - s/p fracture      History of tracheomalacia  2015 - attempted tracheal stenting ([facility])- course complicated by obstruction, respiratory failure, multiple CPR attempts -  stent discontinued; 10/20/2016 Tracheobronchoplasty (Prolene Mesh) performed at [facility] by Dr Zapien      S/P bronchoscopy  6/5/2018 - [place] (Dr Zapien) no evidence of tracheobronchomalacia in trachea or bronchial tubes      Rectal bleeding  exam under anesthesia ([facility]) 2/2018      S/P TAVR (transcatheter aortic valve replacement)      S/P aortic valve replacement          ANTIBIOTICS      Allergies    tetanus toxoid (Short breath)  penicillin (Anaphylaxis)  cefepime (Anaphylaxis)  Valium (Short breath)  shellfish (Anaphylaxis)  iodine (Short breath; Swelling)  Avelox (Short breath; Pruritus)  Dilaudid (Short breath)  codeine (Short breath)  aspirin (Short breath)    Intolerances        SOCIAL HISTORY:     FAMILY HX   FAMILY HISTORY:  Family history of asthma    Family history of breast cancer (Sibling)    Family history of diabetes mellitus type II        Vital Signs Last 24 Hrs  T(C): 36.6 (26 Apr 2024 07:40), Max: 36.8 (25 Apr 2024 23:19)  T(F): 97.9 (26 Apr 2024 07:40), Max: 98.2 (25 Apr 2024 23:19)  HR: 61 (26 Apr 2024 07:40) (60 - 81)  BP: 149/70 (26 Apr 2024 07:40) (135/63 - 170/88)  BP(mean): --  RR: 18 (26 Apr 2024 07:40) (17 - 20)  SpO2: 97% (26 Apr 2024 07:40) (96% - 98%)    Parameters below as of 26 Apr 2024 07:40  Patient On (Oxygen Delivery Method): room air      Drug Dosing Weight  Height (cm): 170.2 (25 Apr 2024 23:19)  Weight (kg): 67.6 (25 Apr 2024 23:19)  BMI (kg/m2): 23.3 (25 Apr 2024 23:19)  BSA (m2): 1.78 (25 Apr 2024 23:19)      REVIEW OF SYSTEMS:    CONSTITUTIONAL:  As per HPI.    HEENT:  Eyes:  No diplopia or blurred vision. ENT:  No earache, sore throat or runny nose.    CARDIOVASCULAR:  No pressure, squeezing, strangling, tightness, heaviness or aching about the chest, neck, axilla or epigastrium.    RESPIRATORY:    cough, shortness of breath .    GASTROINTESTINAL:  No nausea, vomiting or diarrhea.    GENITOURINARY:  No dysuria, frequency or urgency.    MUSCULOSKELETAL:  As per HPI.    SKIN:  No change in skin, hair or nails.    NEUROLOGIC: r weakness.                  PHYSICAL EXAMINATION:    GENERAL: The patient is a _____in no apparent distress. ___     VITAL SIGNS: T(C): 36.6 (04-26-24 @ 07:40), Max: 36.8 (04-25-24 @ 23:19)  HR: 61 (04-26-24 @ 07:40) (60 - 81)  BP: 149/70 (04-26-24 @ 07:40) (135/63 - 170/88)  RR: 18 (04-26-24 @ 07:40) (17 - 20)  SpO2: 97% (04-26-24 @ 07:40) (96% - 98%)  Wt(kg): --    HEENT: Head is normocephalic and atraumatic.  ANICTERIC  NECK: Supple. No carotid bruits.  No lymphadenopathy or thyromegaly.    LUNGS:COARSE BREATH SOUNDS    HEART: Regular rate and rhythm without murmur.    ABDOMEN: Soft, nontender, and nondistended.  Positive bowel sounds.  No hepatosplenomegaly was noted. NO REBOUND NO GUARDING    EXTREMITIES: NO EDEMA NO ERYTHEMA    NEUROLOGIC: NON FOCAL      SKIN: No ulceration or induration present. NO RASH        BLOOD CULTURES       URINE CX          LABS:                        11.2   9.23  )-----------( 329      ( 26 Apr 2024 00:20 )             37.9     04-26    139  |  100  |  15.7  ----------------------------<  208<H>  4.6   |  24.0  |  0.78    Ca    9.1      26 Apr 2024 00:20    TPro  7.1  /  Alb  3.8  /  TBili  <0.2<L>  /  DBili  x   /  AST  17  /  ALT  12  /  AlkPhos  130<H>  04-26    PT/INR - ( 26 Apr 2024 00:20 )   PT: 12.3 sec;   INR: 1.11 ratio         PTT - ( 26 Apr 2024 00:20 )  PTT:38.6 sec  Urinalysis Basic - ( 26 Apr 2024 00:20 )    Color: x / Appearance: x / SG: x / pH: x  Gluc: 208 mg/dL / Ketone: x  / Bili: x / Urobili: x   Blood: x / Protein: x / Nitrite: x   Leuk Esterase: x / RBC: x / WBC x   Sq Epi: x / Non Sq Epi: x / Bacteria: x        RADIOLOGY & ADDITIONAL STUDIES:      ASSESSMENT/PLAN  73-year-old female with a history of A-fib, COPD, TAVR,  tracheomalacia, aortic dissection, ostomy secondary to colorectal cancer, adrenal insufficiency, on steroids, presents to the ED w/ generalized malaise and nausea. Pt also reports that her bp has been higher than usual. She denies abd pain, vomiting, change in sputum, back pain, chest pain, SOB, headache, or any other complaint  PT REPORT SHE HAD POS SPUTUM CX WITH PROTEUS SHE FOLLOWS WITH PULMONOLOGIST AND ONCOLOGIST    INITIALLY  THERE WAS  CONCERN  FOR POSSIBLE PNEUMONIA  PT NON TOXIC PHYSICAL EXAM UNREMARKABLE  WBC NORMAL  BLOOD CX PENDING  CXR  DONE I REVIEWED WITH RADIOLOGY NO OBVIOUS PNEUMONIA  WOULD DEFER ABX  AT PRESENT  PT SHOULD FOLLOWUP WITH  PULMONARY AND HER ONCOLOGIST FOR IVIG    WILL FOLLOW UP AS NEEDED PLEASE CALL IF Questions                  SHERMAN RYAN MD

## 2024-04-26 NOTE — ED PROVIDER NOTE - OBJECTIVE STATEMENT
73-year-old female with a history of A-fib, COPD, TAVR,  tracheomalacia, aortic dissection, ostomy secondary to colorectal cancer, adrenal insufficiency, on steroids, presents to the ED w/ generalized malaise and nausea. Pt also reports that her bp has been higher than usual. She denies abd pain, vomiting, change in sputum, back pain, chest pain, SOB, headache, or any other complaints.

## 2024-04-26 NOTE — ED PROVIDER NOTE - PATIENT PORTAL LINK FT
You can access the FollowMyHealth Patient Portal offered by [facility] by registering at the following website: [email or web address]. By joining [employer]’s FollowMyHealth portal, you will also be able to view your health information using other applications (apps) compatible with our system. You can access the FollowMyHealth Patient Portal offered by [facility] by registering at the following website: [email or web address]. By joining [employer]’s FollowMyHealth portal, you will also be able to view your health information using other applications (apps) compatible with our system.

## 2024-04-26 NOTE — ED ADULT NURSE REASSESSMENT NOTE - NS ED NURSE REASSESS COMMENT FT1
Care assumed from night shift RN. Pt is resting comfortably in bed. Pt is awaiting midline placement. Pt is a&ox4, NAD noted.

## 2024-04-26 NOTE — ED PROVIDER NOTE - NSFOLLOWUPINSTRUCTIONS_ED_ALL_ED_FT
-Please follow-up with your primary care doctor in the next 2 days.  Please call tomorrow for an appointment.  If you cannot follow-up with your primary care doctor please return to the ED for any urgent issues.  - You were given a copy of the tests performed today.  Please bring the results with you and review them with your primary care doctor.  - If you have any worsening of symptoms or any other concerns please return to the ED immediately.  - Please continue taking your home medications as directed.    Pneumonia    Pneumonia is an infection of the lungs. Pneumonia may be caused by bacteria, viruses, or funguses. Symptoms include coughing, fever, chest pain when breathing deeply or coughing, shortness of breath, fatigue, or muscle aches. Pneumonia can be diagnosed with a medical history and physical exam, as well as other tests which may include a chest X-ray. If you were prescribed an antibiotic medicine, take it as told by your health care provider and do not stop taking the antibiotic even if you start to feel better. Do not use tobacco products, including cigarettes, chewing tobacco, and e-cigarettes.    SEEK IMMEDIATE MEDICAL CARE IF YOU HAVE ANY OF THE FOLLOWING SYMPTOMS: worsening shortness of breath, worsening chest pain, coughing up blood, change in mental status, lightheadedness/dizziness. You were seen in the emergency room for weakness and malaise. Blood tests and chest imaging were performed. Follow up with your primary care doctor in 3 days.     Blood Culture Test  Why am I having this test?  A blood culture test is performed to see if you have an infection in your blood (septicemia). This test may be ordered if you have fever, chills, nausea, or fatigue, and your health care provider suspects septicemia.    What is being tested?  Your sample will be tested for the presence of bacteria or fungi that can cause septicemia.    What kind of sample is taken?  A person having a blood sample taken from the arm.  At least two blood samples from two different veins are required for this test. The blood samples are usually collected by inserting a needle into a blood vessel. Two samples are taken because:  There is a better chance of finding the infection with more than one sample.  There is a better chance of an accurate result. Despite good cleaning, germs can remain on the skin where the blood is collected and could lead to a positive result even when you do not have the condition. If this happens, another test is done.  How do I prepare for this test?  Tell your health care provider if you are taking antibiotic medicine. It is recommended that blood samples be collected before starting this medicine. If blood cultures are performed while you are on an antibiotic, the blood samples should be collected shortly before you take a dose of the medicine.    How are the results reported?  Your test results will be reported as either positive or negative. For this test, a normal finding is a negative blood culture.    What do the results mean?  A positive blood culture test may mean that you have septicemia or blood poisoning caused by bacteria or fungi. Septicemia can indicate a serious infection.    Talk with your health care provider about what your results mean.    Questions to ask your health care provider  Ask your health care provider, or the department that is doing the test:  When will my results be ready?  How will I get my results?  What are my treatment options?  What other tests do I need?  What are my next steps?  Summary  A blood culture test is performed to see if you have an infection in your blood (septicemia).  At least two blood samples from two different veins are required for this test. This gives a better chance of finding an infection and getting an accurate result.  The normal result for this test is a negative blood culture. A positive result may mean that you have septicemia.  Talk with your health care provider about what your results mean.  This information is not intended to replace advice given to you by your health care provider. Make sure you discuss any questions you have with your health care provider.

## 2024-04-26 NOTE — ED ADULT NURSE NOTE - NSICDXPASTSURGICALHX_GEN_ALL_CORE_FT
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 by Dr Zapien    Rectal bleeding exam under anesthesia ([facility]) 2/2018    S/P aortic valve replacement     S/P bronchoscopy 6/5/2018 - [place] (Dr Zapien) no evidence of tracheobronchomalacia in trachea or bronchial tubes    S/P TAVR (transcatheter aortic valve replacement)

## 2024-04-26 NOTE — ED ADULT NURSE NOTE - NSICDXFAMILYHX_GEN_ALL_CORE_FT
FAMILY HISTORY:  Family history of asthma  Family history of diabetes mellitus type II    Sibling  Still living? Unknown  Family history of breast cancer, Age at diagnosis: Age Unknown     HT 67 inches    lbs  /81  T 98.2  P 82   R20  O2sat 100%/yes

## 2024-04-26 NOTE — ED ADULT NURSE REASSESSMENT NOTE - NS ED NURSE REASSESS COMMENT FT1
Pt notified this RN that she has colostomy bag, with anal closed , not able to do rectal temp. ARMANDO Nayak made aware. Vitals updated with oral temp. Pt notified this RN that she has colostomy bag, with anal closed , not able to do rectal temp. MD Nayak made aware. Vitals updated with oral temp.

## 2024-04-26 NOTE — ED PROVIDER NOTE - CLINICAL SUMMARY MEDICAL DECISION MAKING FREE TEXT BOX
73-year-old female with a history of A-fib, COPD, TAVR,  tracheomalacia, aortic dissection, ostomy secondary to colorectal cancer, adrenal insufficiency, on steroids, presents to the ED c/o generalized malaise and nausea

## 2024-04-26 NOTE — ED ADULT NURSE NOTE - OBJECTIVE STATEMENT
Received pt from triage, aox3, pmh of GISSELLE-jimena on Eliquis, COPD, TAVR,  tracheomalacia, aortic dissection, ostomy secondary to colorectal cancer, adrenal insufficiency, on steroids, c/o generalized malaise since this AM, also reports that her bp has been higher than usual. Denies fever, cough, chills, chest pain, SOB, sick contact, recent travel. Speaks full complete sentences, unlabored breathing on RA. No acute distress noted. On continues cardiac monitor.

## 2024-04-26 NOTE — ED PROVIDER NOTE - PHYSICAL EXAMINATION
Gen: NAD  Head: NC/AT  Neck: trachea midline  Card: regular rate and rhythm  Resp: scattered rhonchi  Abd: soft, non-distended, non-tender  Ext: no deformities above reported baseline  Neuro:  A&O, no motor or sensory deficits above reported baseline  Skin:  Warm and dry as visualized  Psych:  Normal affect and mood

## 2024-04-26 NOTE — ED PROVIDER NOTE - NSTIMEPROVIDERCAREINITIATE_GEN_ER
PROCEDURE  ECG 12 Lead Documentation Only  Date/Time: 11/21/2019 8:25 PM  Performed by: Tito Lenz MD  Authorized by: Tito Lenz MD     Indications / Diagnosis:  SOB  ECG reviewed by me, the ED Provider: yes    Patient location:  ED  Previous ECG:     Previous ECG:  Compared to current    Comparison ECG info:  10 22 19    Similarity:  No change    Comparison to cardiac monitor: Yes    Interpretation:     Interpretation: abnormal    Rate:     ECG rate:  89    ECG rate assessment: normal    Rhythm:     Rhythm: sinus rhythm    Ectopy:     Ectopy: PVCs    QRS:     QRS axis:  Normal    QRS intervals:  Normal  ST segments:     ST segments:  Non-specific  T waves:     T waves: non-specific    Other findings:     Other findings: LVH           Tito Lenz MD  11/21/19 2026 25-Apr-2024 23:50

## 2024-04-26 NOTE — ED ADULT NURSE REASSESSMENT NOTE - NS ED NURSE REASSESS COMMENT FT1
No IV access . IV fluids and abx not completed. MD Nayak made aware. As per MD, midline will be placed in AM.

## 2024-04-26 NOTE — ED PROVIDER NOTE - ATTENDING CONTRIBUTION TO CARE
73-year-old female; PMH significant for A-fib (on Eliquis), HTN, HLD, COPD, TAVR, tracheomalacia, aortic dissection (s/p repair), colorectal CA s/p ostomy; now presenting with generalized malaise and chills and nausea.  Patient reports chronic cough.  Reports no abdominal pain.  Denies nausea or vomiting.  Reports chronic cough with sputum production.  General:     NAD  Head:     NC/AT, EOMI, oral mucosa moist  Neck:     trachea midline  Lungs:     CTA b/l, no w/r/r  CVS:    irregular,  no m/g/r  Abd:     +BS, s/nt/nd, no organomegaly  A/P:  73yoF p/w malaise, nausea, cough  -xray, labs, bcx, re-eval

## 2024-04-27 LAB
CULTURE RESULTS: SIGNIFICANT CHANGE UP
SPECIMEN SOURCE: SIGNIFICANT CHANGE UP

## 2024-04-29 ENCOUNTER — APPOINTMENT (OUTPATIENT)
Dept: INTERNAL MEDICINE | Facility: CLINIC | Age: 74
End: 2024-04-29

## 2024-04-29 PROBLEM — Z01.810 PREOPERATIVE CARDIOVASCULAR EXAMINATION: Status: ACTIVE | Noted: 2018-06-04

## 2024-04-29 PROBLEM — Z98.890 S/P AORTIC DISSECTION REPAIR: Status: ACTIVE | Noted: 2023-02-10

## 2024-04-29 PROBLEM — R00.2 PALPITATIONS: Status: ACTIVE | Noted: 2022-07-29

## 2024-04-29 PROBLEM — R94.31 ABNORMAL ELECTROCARDIOGRAM: Status: ACTIVE | Noted: 2020-09-30

## 2024-04-29 PROBLEM — I48.91 ATRIAL FIBRILLATION: Status: ACTIVE | Noted: 2021-02-04

## 2024-04-29 PROBLEM — Z51.81 ANTICOAGULATION GOAL OF INR 2 TO 3: Status: ACTIVE | Noted: 2023-10-04

## 2024-04-29 PROBLEM — R06.02 SHORTNESS OF BREATH: Status: ACTIVE | Noted: 2018-01-16

## 2024-04-29 PROBLEM — Z51.81 ANTICOAGULATION MANAGEMENT ENCOUNTER: Status: ACTIVE | Noted: 2023-10-04

## 2024-04-29 PROBLEM — R06.02 SOB (SHORTNESS OF BREATH): Status: ACTIVE | Noted: 2018-05-03

## 2024-04-29 PROBLEM — I35.1 NONRHEUMATIC AORTIC VALVE INSUFFICIENCY: Status: ACTIVE | Noted: 2020-01-05

## 2024-04-29 PROBLEM — I44.60 BUNDLE BRANCH BLOCK, LEFT HEMIBLOCK: Status: ACTIVE | Noted: 2020-09-30

## 2024-04-29 PROBLEM — R06.00 DYSPNEA: Status: ACTIVE | Noted: 2019-03-14

## 2024-04-29 PROBLEM — Z95.3 S/P TAVR (TRANSCATHETER AORTIC VALVE REPLACEMENT), BIOPROSTHETIC: Status: ACTIVE | Noted: 2023-09-11

## 2024-04-29 PROBLEM — I34.1 PROLAPSING MITRAL LEAFLET SYNDROME: Status: ACTIVE | Noted: 2018-01-23

## 2024-04-29 PROBLEM — I11.9 BENIGN HYPERTENSIVE HEART DISEASE WITHOUT CHF: Status: ACTIVE | Noted: 2018-01-23

## 2024-05-01 NOTE — CARDIOLOGY SUMMARY
[de-identified] : 11/2021: CONCLUSIONS:\par  1. Mitral annular calcification, otherwise normal mitral\par  valve. Minimal mitral regurgitation.\par  2. Calcified trileaflet aortic valve with normal opening.\par  Moderate-severe aortic regurgitation.\par  3. Mild left atrial enlargement.\par  4. Normal left ventricular internal dimensions and wall\par  thicknesses.\par  5. Endocardium not well visualized; grossly normal left\par  ventricular systolic function.\par  6. Mild diastolic dysfunction (Stage I).\par  7. The right ventricle is not well visualized; grossly\par  normal right ventricular systolic function.\par  8. Normal tricuspid valve. Minimal tricuspid regurgitation.\par  9. Estimated pulmonary artery systolic pressure equals 26\par  mm Hg, assuming right atrial pressure equals 10  mm Hg,\par  consistent with normal pulmonary pressures.\par  *** Compared with echocardiogram of 4/23/2021, no\par  significant changes noted.

## 2024-05-01 NOTE — HISTORY OF PRESENT ILLNESS
[FreeTextEntry1] : Shirley Bloom is a 89/o woman with Hx of tracheobronchomalacia s/p tracheobronchoplasty, lung nodules, severe allergic asthma, adrenal insufficiency, bronchiectasis, DM II, HTN, CRC s/p colostomy, mod-severe AI, paroxysmal afib, on Eliquis, and frequent PVCs, on mexiletine, who presents today for routine f/u. On prior visit she had been hospitalized for osteomyelitis and discharged to Rehab for IV antibiotics via left chest wall mediport. When hospitalized, course c/b anaphylaxis 2/2 cefepime administration as well as episode of Vtach following epi administration requiring lidocaine for . She spent -2022 in MICU intubated and sedated, and was successfully extubated on 22. R foot MRI concerning for osteomyelitis. On 22 she underwent R foot I&D and wound debridement by podiatry team. Since that time, she is now struggling with PNA (+pseudomonas and MRSA) being treated by Pulmonary. Has been on mexiletine 150 BID on prior visit (was taken off in Rehab) with improvement in palpitations and no recent PVCs. Denies chest pain, palpitations, syncope or near syncope.

## 2024-05-01 NOTE — DISCHARGE NOTE NURSING/CASE MANAGEMENT/SOCIAL WORK - NURSING SECTION COMPLETE
Add High Risk Medication Management Associated Diagnosis?: No
Detail Level: Zone
Comments: started 2/2023 (get it thru [facility]), skin was clear and tolerated w/o side effects. Pt has now been off Otezla due to pharm/auth issues for ~4 months and her skin is flaring with cracks and bleeding areas behind her ears.
Patient/Caregiver provided printed discharge information.

## 2024-05-02 ENCOUNTER — NON-APPOINTMENT (OUTPATIENT)
Age: 74
End: 2024-05-02

## 2024-05-02 ENCOUNTER — APPOINTMENT (OUTPATIENT)
Dept: ELECTROPHYSIOLOGY | Facility: CLINIC | Age: 74
End: 2024-05-02
Payer: MEDICARE

## 2024-05-02 ENCOUNTER — APPOINTMENT (OUTPATIENT)
Dept: CARDIOLOGY | Facility: CLINIC | Age: 74
End: 2024-05-02
Payer: MEDICARE

## 2024-05-02 VITALS
BODY MASS INDEX: 23.54 KG/M2 | TEMPERATURE: 97 F | SYSTOLIC BLOOD PRESSURE: 131 MMHG | HEART RATE: 83 BPM | OXYGEN SATURATION: 97 % | HEIGHT: 67 IN | DIASTOLIC BLOOD PRESSURE: 78 MMHG | WEIGHT: 150 LBS

## 2024-05-02 DIAGNOSIS — R06.02 SHORTNESS OF BREATH: ICD-10-CM

## 2024-05-02 DIAGNOSIS — I49.3 VENTRICULAR PREMATURE DEPOLARIZATION: ICD-10-CM

## 2024-05-02 DIAGNOSIS — I34.1 NONRHEUMATIC MITRAL (VALVE) PROLAPSE: ICD-10-CM

## 2024-05-02 DIAGNOSIS — R94.31 ABNORMAL ELECTROCARDIOGRAM [ECG] [EKG]: ICD-10-CM

## 2024-05-02 DIAGNOSIS — Z01.810 ENCOUNTER FOR PREPROCEDURAL CARDIOVASCULAR EXAMINATION: ICD-10-CM

## 2024-05-02 DIAGNOSIS — R05.3 CHRONIC COUGH: ICD-10-CM

## 2024-05-02 DIAGNOSIS — I48.91 UNSPECIFIED ATRIAL FIBRILLATION: ICD-10-CM

## 2024-05-02 DIAGNOSIS — R00.2 PALPITATIONS: ICD-10-CM

## 2024-05-02 DIAGNOSIS — E78.5 HYPERLIPIDEMIA, UNSPECIFIED: ICD-10-CM

## 2024-05-02 DIAGNOSIS — Z98.890 OTHER SPECIFIED POSTPROCEDURAL STATES: ICD-10-CM

## 2024-05-02 DIAGNOSIS — Z95.2 PRESENCE OF PROSTHETIC HEART VALVE: ICD-10-CM

## 2024-05-02 DIAGNOSIS — Z79.01 LONG TERM (CURRENT) USE OF ANTICOAGULANTS: ICD-10-CM

## 2024-05-02 DIAGNOSIS — I35.1 NONRHEUMATIC AORTIC (VALVE) INSUFFICIENCY: ICD-10-CM

## 2024-05-02 DIAGNOSIS — J44.9 CHRONIC OBSTRUCTIVE PULMONARY DISEASE, UNSPECIFIED: ICD-10-CM

## 2024-05-02 DIAGNOSIS — I11.9 HYPERTENSIVE HEART DISEASE W/OUT HEART FAILURE: ICD-10-CM

## 2024-05-02 DIAGNOSIS — R53.81 OTHER MALAISE: ICD-10-CM

## 2024-05-02 DIAGNOSIS — Z95.3 PRESENCE OF XENOGENIC HEART VALVE: ICD-10-CM

## 2024-05-02 DIAGNOSIS — I48.0 PAROXYSMAL ATRIAL FIBRILLATION: ICD-10-CM

## 2024-05-02 DIAGNOSIS — R11.0 NAUSEA: ICD-10-CM

## 2024-05-02 DIAGNOSIS — Z20.822 CONTACT WITH AND (SUSPECTED) EXPOSURE TO COVID-19: ICD-10-CM

## 2024-05-02 DIAGNOSIS — Z85.038 PERSONAL HISTORY OF OTHER MALIGNANT NEOPLASM OF LARGE INTESTINE: ICD-10-CM

## 2024-05-02 DIAGNOSIS — Z51.81 ENCOUNTER FOR THERAPEUTIC DRUG LVL MONITORING: ICD-10-CM

## 2024-05-02 DIAGNOSIS — Z79.01 ENCOUNTER FOR THERAPEUTIC DRUG LVL MONITORING: ICD-10-CM

## 2024-05-02 DIAGNOSIS — I10 ESSENTIAL (PRIMARY) HYPERTENSION: ICD-10-CM

## 2024-05-02 PROCEDURE — 99214 OFFICE O/P EST MOD 30 MIN: CPT

## 2024-05-02 PROCEDURE — 93000 ELECTROCARDIOGRAM COMPLETE: CPT

## 2024-05-02 PROCEDURE — G2211 COMPLEX E/M VISIT ADD ON: CPT

## 2024-05-02 RX ORDER — MEXILETINE HYDROCHLORIDE 200 MG/1
200 CAPSULE ORAL EVERY 8 HOURS
Qty: 270 | Refills: 1 | Status: ACTIVE | COMMUNITY
Start: 1900-01-01 | End: 1900-01-01

## 2024-05-02 RX ORDER — WARFARIN 5 MG/1
5 TABLET ORAL
Qty: 90 | Refills: 3 | Status: DISCONTINUED | COMMUNITY
Start: 2023-10-10 | End: 2024-05-02

## 2024-05-03 ENCOUNTER — APPOINTMENT (OUTPATIENT)
Dept: CT IMAGING | Facility: CLINIC | Age: 74
End: 2024-05-03
Payer: MEDICARE

## 2024-05-03 DIAGNOSIS — Z51.81 ENCOUNTER FOR THERAPEUTIC DRUG LVL MONITORING: ICD-10-CM

## 2024-05-03 DIAGNOSIS — Z79.01 ENCOUNTER FOR THERAPEUTIC DRUG LVL MONITORING: ICD-10-CM

## 2024-05-03 DIAGNOSIS — R06.00 DYSPNEA, UNSPECIFIED: ICD-10-CM

## 2024-05-03 DIAGNOSIS — I44.60 UNSPECIFIED FASCICULAR BLOCK: ICD-10-CM

## 2024-05-03 PROCEDURE — 71250 CT THORAX DX C-: CPT

## 2024-05-06 ENCOUNTER — APPOINTMENT (OUTPATIENT)
Dept: INFECTIOUS DISEASE | Facility: CLINIC | Age: 74
End: 2024-05-06
Payer: MEDICARE

## 2024-05-06 VITALS
WEIGHT: 145 LBS | RESPIRATION RATE: 16 BRPM | BODY MASS INDEX: 22.76 KG/M2 | TEMPERATURE: 98.9 F | HEIGHT: 67 IN | DIASTOLIC BLOOD PRESSURE: 84 MMHG | HEART RATE: 86 BPM | SYSTOLIC BLOOD PRESSURE: 159 MMHG | OXYGEN SATURATION: 97 %

## 2024-05-06 DIAGNOSIS — R21 RASH AND OTHER NONSPECIFIC SKIN ERUPTION: ICD-10-CM

## 2024-05-06 PROCEDURE — 99213 OFFICE O/P EST LOW 20 MIN: CPT

## 2024-05-07 NOTE — PROGRESS NOTE ADULT - PROBLEM SELECTOR PROBLEM 9
Essential hypertension
Keep a list of your medicines with you. List all of the prescription medicines, nonprescription medicines, supplements, natural remedies, and vitamins that you take. Tell your healthcare providers who treat you about all of the products you are taking. Your provider can provide you with a form to keep track of them. Just ask.  Follow the directions that come with your medicine, including information about food or alcohol. Make sure you know how and when to take your medicine. Do not take more or less than you are supposed to take.  Keep all medicines out of the reach of children.  Store medicines according to the directions on the label.  Monitor yourself. Learn to know how your body reacts to your new medicine and keep track of how it makes you feel before attempting (If your provider has allowed you to do so) to drive or go to work.   Seek emergency medical attention if you think you have used too much of this medicine. An overdose of any prescription medicine can be fatal. Overdose symptoms may include extreme drowsiness, muscle weakness, confusion, cold and clammy skin, pinpoint pupils, shallow breathing, slow heart rate, fainting, or coma.  Don't share prescription medicines with others, even when they seem to have the same symptoms. What may be good for you may be harmful to others.  If you are no longer taking a prescribed medication and you have pills left please take your pills out of their original containers. Mix crushed pills with an undesirable substance, such as cat litter or used coffee grounds. Put the mixture into a disposable container with a lid, such as an empty margarine tub, or into a sealable bag.  Cover up or remove any of your personal information on the empty containers by covering it with black permanent marker or duct tape.  Place the sealed container with the mixture, and the empty drug containers, in the trash.   If you use a medication that is in the form of a patch, dispose of used 
Essential hypertension
Need for prophylactic measure

## 2024-05-08 PROBLEM — G40.119 TEMPORAL LOBE EPILEPSY, INTRACTABLE: Status: ACTIVE | Noted: 2024-04-10

## 2024-05-08 NOTE — HISTORY OF PRESENT ILLNESS
[FreeTextEntry1] : *** 04/10/2024 ***  RAYRAY BLOOM is here today for intitial eval for probable seizures. she was admitted last year in the hospital due to word finding difficulties. her EEG showed LT slowing and spikes. was satrted on keppra . tolerated well. has memory problems possibly small KRISTIE seizures. events are shorter    from previous notes Ms Rayray Bloom is here today confused and in tears because she could not remember anything admitted to [facility]  tested positive for Covid Positive protease during her sleep at night she has  "tremors" blood sugar low  past year and a half has been in rehab  Keppra 500mg BID  mood  discharged 11/5  left eye

## 2024-05-08 NOTE — ASSESSMENT
[FreeTextEntry1] : RAYRAY RODRIGUEZ is a 74 yo F w LT lobe epilepsy and mild cognitive decline, likely due to incomplete seizure control.  Plan: increase keppra to 750 XR BID f/u in 3 mo SE discussed

## 2024-05-08 NOTE — BRIEF OPERATIVE NOTE - ELECTIVE PROCEDURE
Subjective   Neo Mckee is a 75 y.o. male.    Chief Complaint:  Coronary Artery Disease, Hypertension, and Hyperlipidemia    Mr. Mckee returns for a routine 6 month follow up. He has been feeling well from a cardiac standpoint. He has remained compliant with his medications, denying any intolerances. He seems to be getting around reasonably well, denying any exertional chest pain or shortness of breath. His biggest limiting factor continues to be back pain. He follows with pain management, and the injections he gets really help. He uses lasix as needed for swelling and also remains compliant with compression stockings. He denies any recent ER visits or hospitalizations. He offers no specific cardiovascular complaints or concerns today. He denies any complaints of chest pain, shortness of breath, lightheadedness, dizziness, palpitations, syncope, orthopnea, paroxysmal nocturnal dyspnea, lower extremity swelling or bleeding concerns.        Review of Systems   All other systems reviewed and are negative.      Objective   Physical Exam  Constitutional:       Appearance: Healthy appearance. In no distress  Pulmonary:      Effort: Pulmonary effort is normal.      Breath sounds: Normal breath sounds.   Cardiovascular:      Normal rate. Regular rhythm. Normal S1. Normal S2.       Murmurs: There is no murmur.      Carotids: right carotid pulse +2, no bruit heard over the right carotid. left carotid pulse +2, no bruit heard over the left carotid.  Edema:     Peripheral edema absent.   Abdominal:      Palpations: Abdomen is soft.   Musculoskeletal:       Cervical back: Normal range of motion.   Skin:     General: Skin is warm and dry. Normal color and pigmentation   Neurological:      Mental Status: Alert and oriented to person, place and time.   Psychiatric:     Mood and Affect: appropriate mood and appropriate affect.     EKG obtained and reviewed. Normal sinus rhythm. Right bundle branch block. HR 77        Lab Review:    Lab Results   Component Value Date     03/21/2024    K 4.1 03/21/2024     03/21/2024    CO2 28 03/21/2024    BUN 15 03/21/2024    CREATININE 0.87 03/21/2024    GLUCOSE 100 (H) 03/21/2024    CALCIUM 9.6 03/21/2024     Lab Results   Component Value Date    WBC 5.5 03/21/2024    HGB 15.7 03/21/2024    HCT 46.6 03/21/2024    MCV 99 03/21/2024     03/21/2024     Lab Results   Component Value Date    CHOL 134 03/21/2024    TRIG 76 03/21/2024    HDL 57.9 03/21/2024       Assessment/Plan   Mr. Mckee is a pleasant 75 year old  male with a past medical history significant for hypertension, hyperlipidemia and CAD s/p remote 2 vessel CABG in 1998. Stress-echo in 2018 showed no evidence of ischemia and preserved LV function. Echocardiogram 8/2021 showed an EF of 55-60% with mild MR. He presents today for routine follow up stable from a cardiac standpoint. His EKG remains stable. His BP is mildly elevated, though he occasionally monitors his BP at home with good readings. He remains on appropriate antiplatelet and lipid lowering therapy with his LDL at goal. He struggles with back/sciatica pain, though denies any chest pain or shortness of breath at his current activity level. I will have him continue all medications unchanged. He was encouraged to continue to remain active. He will follow up with us in clinic in 6 months. He knows to call for any concerns.    Yes

## 2024-05-09 ENCOUNTER — APPOINTMENT (OUTPATIENT)
Dept: PULMONOLOGY | Facility: CLINIC | Age: 74
End: 2024-05-09
Payer: MEDICARE

## 2024-05-09 VITALS
SYSTOLIC BLOOD PRESSURE: 140 MMHG | WEIGHT: 145 LBS | RESPIRATION RATE: 16 BRPM | DIASTOLIC BLOOD PRESSURE: 70 MMHG | OXYGEN SATURATION: 97 % | HEIGHT: 67 IN | BODY MASS INDEX: 22.76 KG/M2 | TEMPERATURE: 97.2 F | HEART RATE: 78 BPM

## 2024-05-09 PROCEDURE — 96372 THER/PROPH/DIAG INJ SC/IM: CPT

## 2024-05-09 RX ORDER — TEZEPELUMAB-EKKO 210 MG/1.9ML
210 INJECTION, SOLUTION SUBCUTANEOUS
Qty: 0 | Refills: 0 | Status: COMPLETED | OUTPATIENT
Start: 2024-05-09

## 2024-05-10 LAB
HSV+VZV DNA SPEC QL NAA+PROBE: NOT DETECTED
SPECIMEN SOURCE: NORMAL

## 2024-05-10 NOTE — ASSESSMENT
[FreeTextEntry1] : This is a 74 yo F with extensive medical history notable for abnormal CT chest with nodules, persistent severe asthma steroid dependent, tracheobronchomalacia s/p tracheoplasty, AFIB on Eliquis,  who presented for recurrent respiratory infections in 1/2020   Last seen 2/2022 and infections have been recurrent, requiring numerous courses of antibiotics.  Sputum now colonized with ESBL Proteus.  No oral options for treatment.    Returned for f/up. Still with chronic cough and mucus.    I prefer to check CT chest to see if there is any worsening. This was done and reviewed. Was stable. Resolved pneumonia.   Regarding ESBL proteus in sputum - She has new seizures and i do not feel comfortable giving her a carbapenem at home as this could lower the seizure threshold.   I suspect she is colonized with proteus and it doesn't need to be treated now.  But i informed her can cause infection intermittently.    She should continue with mucus clearing devices.   Rash - refer to derm given.  Swab sacral lesion for zoster/hsv.  Addendum: swab was negative. No signs of infeciton.   D/w Dr. Allison D/w pt and daughter.  RTC next month.

## 2024-05-10 NOTE — HISTORY OF PRESENT ILLNESS
[FreeTextEntry1] : 74 yo F presents today for abnormal sputum cultures. Not seen since 2/2022.  New info 4/19/24: Presents with daughter today.  Since last visit has been in and out of hospitals and rehab. Got home 4/1/24. Treated numerous times for ESBL proteus in sputum. Still is present though and she has mucus/congestion/cough. On keppra for new onset seizures as well. Follows with neuro.  5/6/24: Presents for rash on buttock.   Took swab for HSV/VZV Lesions on left leg also - not chronic.   In the past-  Sputum was collected and she grew Klebsiella and acinetobacter and chryseobacter.    Previous cultures had repeated pseudomonas.   Found to have pseudomonal infection on bronchoscopy 2/14/2020. Treated  IV aztreonam  for pseudomonas via port.    Also used to have chronic wheezing and shortness of breath.  Found to have PVCs and being treated for this. Wheezing has stopped.       PMH: Asthma (severe persistent, steroid dependent) Tracheobronchomalacia s/p tracheobronchoplasty 2018 Adrenal insufficiency - on steroids >50 years  Takes Medrol 12 mg am and 12  mg pm.  Diabetes Afib Aortic insufficiency Colorectoal Cancer 2017 s/p resection and chemo/xrt with diverting ostomy Pinched nerve in neck  MRSA boil on Right lower abdomen. 12/2019 PVC  She was referred to immunology b/c of recurrent infections.  T cell count is low.  Mumps protection weaned.  Found to have MGUS, IgA monoclonal gammopathy.      Pt also reports that she has been seen at [facility] >30 years ago .  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   Did take tobramycin for about one month for h/o pseudomonas.   7/2021 CT chest: Secretions w/in right and left mainstem bronchi.  CLusters of tiny nodules in the distal right middle lobe and right lower lobe are unchanged and c/w distal airway impaction.   New info 2/18/22 Called not feeling well. Sputum with MRSA. Given medrol. WBC checked and was high 17.  Given bactrim for MRSA but still feels sob. Tolerated cefidinir in the past. Checked prior meds. Spoke to dr. frazier. Recommended increased steroids back to 20 mg for 1 week. CT chest done. Was stable. Only mild worsening in opacities. No clear pneumonia.

## 2024-05-10 NOTE — CONSULT LETTER
[Dear  ___] : Dear  [unfilled], [Consult Letter:] : I had the pleasure of evaluating your patient, [unfilled]. [Consult Closing:] : Thank you very much for allowing me to participate in the care of this patient.  If you have any questions, please do not hesitate to contact me. [Sincerely,] : Sincerely, [FreeTextEntry2] : Dr. Eulalio Allison [FreeTextEntry3] : \par  Afshan Stout MD\par   of Medicine\par  Division of Infectious Diseases\par  [facility] [employer]\par  [address] [place] [address] DrShreya\[place] [address]\par  Tel: [phone]\par  Fax: [phone]

## 2024-05-10 NOTE — PHYSICAL EXAM
[General Appearance - Alert] : alert [General Appearance - In No Acute Distress] : in no acute distress [Sclera] : the sclera and conjunctiva were normal [PERRL With Normal Accommodation] : pupils were equal in size, round, reactive to light [Neck Appearance] : the appearance of the neck was normal [] : no respiratory distress [Auscultation Breath Sounds / Voice Sounds] : lungs were clear to auscultation bilaterally [Heart Rate And Rhythm] : heart rate was normal and rhythm regular [Heart Sounds] : normal S1 and S2 [Heart Sounds Gallop] : no gallops [Murmurs] : no murmurs [Heart Sounds Pericardial Friction Rub] : no pericardial rub [Bowel Sounds] : normal bowel sounds [Abdomen Soft] : soft [No Palpable Adenopathy] : no palpable adenopathy [Skin Lesions] : no skin lesions [No Focal Deficits] : no focal deficits [Oriented To Time, Place, And Person] : oriented to person, place, and time [Affect] : the affect was normal [FreeTextEntry1] : stuck on lesion left inner leg.  Small superficial skin breakdown sacrum. No purulence.

## 2024-05-10 NOTE — REVIEW OF SYSTEMS
[Feeling Tired] : feeling tired [Shortness Of Breath] : shortness of breath [Cough] : cough [Sputum] : coughing up ~M sputum [As Noted in HPI] : as noted in HPI [Joint Pain] : joint pain [Negative] : Heme/Lymph [FreeTextEntry2] : weak

## 2024-05-13 ENCOUNTER — OUTPATIENT (OUTPATIENT)
Dept: OUTPATIENT SERVICES | Facility: HOSPITAL | Age: 74
LOS: 1 days | Discharge: HOME | End: 2024-05-13

## 2024-05-13 DIAGNOSIS — Z96.653 PRESENCE OF ARTIFICIAL KNEE JOINT, BILATERAL: Chronic | ICD-10-CM

## 2024-05-13 DIAGNOSIS — C18.9 MALIGNANT NEOPLASM OF COLON, UNSPECIFIED: ICD-10-CM

## 2024-05-13 DIAGNOSIS — Z98.89 OTHER SPECIFIED POSTPROCEDURAL STATES: Chronic | ICD-10-CM

## 2024-05-13 DIAGNOSIS — Z95.2 PRESENCE OF PROSTHETIC HEART VALVE: Chronic | ICD-10-CM

## 2024-05-13 DIAGNOSIS — Z87.09 PERSONAL HISTORY OF OTHER DISEASES OF THE RESPIRATORY SYSTEM: Chronic | ICD-10-CM

## 2024-05-13 DIAGNOSIS — K62.5 HEMORRHAGE OF ANUS AND RECTUM: Chronic | ICD-10-CM

## 2024-05-13 DIAGNOSIS — H05.352 EXOSTOSIS OF LEFT ORBIT: Chronic | ICD-10-CM

## 2024-05-13 DIAGNOSIS — Z98.890 OTHER SPECIFIED POSTPROCEDURAL STATES: Chronic | ICD-10-CM

## 2024-05-16 ENCOUNTER — RESULT REVIEW (OUTPATIENT)
Age: 74
End: 2024-05-16

## 2024-05-16 ENCOUNTER — APPOINTMENT (OUTPATIENT)
Dept: HEMATOLOGY ONCOLOGY | Facility: CLINIC | Age: 74
End: 2024-05-16
Payer: MEDICARE

## 2024-05-16 ENCOUNTER — LABORATORY RESULT (OUTPATIENT)
Age: 74
End: 2024-05-16

## 2024-05-16 VITALS
TEMPERATURE: 98.1 F | DIASTOLIC BLOOD PRESSURE: 81 MMHG | BODY MASS INDEX: 23.98 KG/M2 | RESPIRATION RATE: 16 BRPM | SYSTOLIC BLOOD PRESSURE: 146 MMHG | HEIGHT: 67 IN | OXYGEN SATURATION: 97 % | HEART RATE: 74 BPM | WEIGHT: 152.78 LBS

## 2024-05-16 LAB
BASOPHILS # BLD AUTO: 0.02 K/UL — SIGNIFICANT CHANGE UP (ref 0–0.2)
BASOPHILS NFR BLD AUTO: 0.2 % — SIGNIFICANT CHANGE UP (ref 0–2)
EOSINOPHIL # BLD AUTO: 0.01 K/UL — SIGNIFICANT CHANGE UP (ref 0–0.5)
EOSINOPHIL NFR BLD AUTO: 0.1 % — SIGNIFICANT CHANGE UP (ref 0–6)
HCT VFR BLD CALC: 37.3 % — SIGNIFICANT CHANGE UP (ref 34.5–45)
HGB BLD-MCNC: 11.1 G/DL — LOW (ref 11.5–15.5)
IMM GRANULOCYTES NFR BLD AUTO: 0.5 % — SIGNIFICANT CHANGE UP (ref 0–0.9)
LYMPHOCYTES # BLD AUTO: 0.71 K/UL — LOW (ref 1–3.3)
LYMPHOCYTES # BLD AUTO: 5.8 % — LOW (ref 13–44)
MCHC RBC-ENTMCNC: 20.1 PG — LOW (ref 27–34)
MCHC RBC-ENTMCNC: 29.8 G/DL — LOW (ref 32–36)
MCV RBC AUTO: 67.5 FL — LOW (ref 80–100)
MONOCYTES # BLD AUTO: 0.43 K/UL — SIGNIFICANT CHANGE UP (ref 0–0.9)
MONOCYTES NFR BLD AUTO: 3.5 % — SIGNIFICANT CHANGE UP (ref 2–14)
NEUTROPHILS # BLD AUTO: 11.09 K/UL — HIGH (ref 1.8–7.4)
NEUTROPHILS NFR BLD AUTO: 89.9 % — HIGH (ref 43–77)
NRBC # BLD: 0 /100 WBCS — SIGNIFICANT CHANGE UP (ref 0–0)
NRBC BLD-RTO: 0 /100 WBCS — SIGNIFICANT CHANGE UP (ref 0–0)
PLATELET # BLD AUTO: 295 K/UL — SIGNIFICANT CHANGE UP (ref 150–400)
RBC # BLD: 5.53 M/UL — HIGH (ref 3.8–5.2)
RBC # FLD: 20.2 % — HIGH (ref 10.3–14.5)
WBC # BLD: 12.32 K/UL — HIGH (ref 3.8–10.5)
WBC # FLD AUTO: 12.32 K/UL — HIGH (ref 3.8–10.5)

## 2024-05-16 PROCEDURE — 99214 OFFICE O/P EST MOD 30 MIN: CPT

## 2024-05-16 PROCEDURE — G2211 COMPLEX E/M VISIT ADD ON: CPT

## 2024-05-16 NOTE — HISTORY OF PRESENT ILLNESS
[de-identified] : Patient developed bleeding AUG 2016 from rectum. A colonoscopy in MAR 2016 was unrevealing. It was thought to be a hemorrhoid in the past. Her HGB began to drop. She was examined She called PCP and referred to GI surgeon (Dr. Magallanes). A mass was found and biopsy showed anal cancer, squamous cell. A PET scan did not reveal any local or metastatic disease. She received Mitomycin on 5/3/2017 (dose #2 mitomycin on 6/1/2017) and Xeloda. Radiation 5/3/17 to 6/16/17 at 5400 cGy.   Patient also with h/o adrenal insufficiency after 50 years of steroids for asthma and tracheomalacia (mesh placed 10/2016, Dr. Mcclellan at [facility]). She has diabetes that requires insulin, without sequelae on the kidney or eye. She has her RT eye checked q 6 months (LT eye is prosthesis due to trauma).  1/9/2018:  MRI 10/9/17: When  compared  to  prior  pelvic  MRI  is  a  small  area  of  cystic  change enhancement  seen  along  the  extraluminal  portion  of  the  rectum  is  unchanged. PET 1/2018: showed hypermetabolism in the anal area. Saw radiation oncology who is concerned about local relapse/failure. Lung nodule: CAT chest shows one new small nodule (3 mm) in RUL. Will continue to monitor.  Breast screening: She had breast mammogram and US in 2017 found 2 lesions, and both benign. Rt breast - "benign, nonproliferative fibrocystic changes"; Lt Breast - "fibroadenoma".  Due for repeat 2/2018. Cardiac: Will be undergoing SAFIA (Dr. Leahy) for re-evaluation of aortic valve, and bronchoscopy (Dr. Mcclellan). Her valve showed moderate to severe AR.  5/29/2018: Patient has local relapse in anus proved by biopsy in February 2018. It is again SCC and HPV / p16 is positive. PET 1/2018 showed no metastatic disease. Colonoscopy 2/2018 showed no colonic lesion, only anal mass that is palpable by ARIAS. Patient is deemed not a surgical candidate for APR due to pulmonary and cardiac risk. She completed second course of radiotherapy in early 4/2018 with Dr. Amanda Burger. She was recently hospitalized for high fever. No clear source - blood and urine cultures were negative. The CAT scan showed minor opacities that were not consistent with pnuemonia. She was admitted for 5 days and given IV antibiotics empirically. She now has weakness in legs and uses walker for balance. She will be getting a home rehabilitation visit soon. Does feel lightheaded at times.  8/22/2019: 1: Anal Cancer: Patient underwent APR surgery on 7/24/2018. Pathology post-operatively is ypT2N0; squamous cell cancer. The tissue is healed.  She is due to repeat colonoscopy.  2: Lung nodule: She had new lung nodule on right hemidiaphragm that is 1 cm and not FDG avid. She has a second 1 cm nodule on the RML that is subplueral and has mild FDG. Repeat CAT 8/13/19 shows that there is slight improvement of RLL nodule. She saw Dr. Zapien and he agrees to continue to surveillance. 3: Cardiac: Undergoing evaluation for possible pulmonary HTN. She also has aortic regurgitation. She has baseline COPD. 4: Social: She lives with her sister. She is doing volunteer work, and helps in soup kitchen. 5: Pulmonary - using nebulizers daily and less cough.  8/26/2020: 1) Anal CA: She recently underwent colonoscopy on 8/13/2020 with Dr. Hall which demonstrated a polyp in the transverse colon.   Pathology demonstrated tubular adenoma. She is here in the office today because concerns of a new lesion located on anus which is ~ 0.8 x 0.8 cm. She denies fever, chills, abdominal pain, change in bowel habits, weight loss or increase in fatigue. Patient has follow up with Dr. Luong next week. 2) Pulmonary: Continues to see Dr. Allison for tracheomalacia.  3) Social: Patient currently lives at home with her sister.  7/2/21 PAtient has recently developed PVC and bigeminy. Started on mexilitine Had recent Shingles. Will need vaccine in the Fall with Shingrix. HAs not had imaging for anal cancer since 8/2020 - needs followup. Also, is allergic to contrast.  12/14/21 Recent admission for pneumonia. I reviewed old and recent CT scan.  Definitely new infiltrate. She is doing better after antibiotics. Needs continued surveillance for cancer relapse with CT scan - next due 2/2022. Last GI scope 2021 with Dr. Luong, and due to return in 2022.  07/10/2023 1) PNA/Bronchitis: Patient was admitted to [facility] from 6/5/2023 - 6/16/2023 for shortness of breath and elevated blood sugar. She states she was discharged home with with an IV line in her right arm which caused hematoma on her right upper extremity. Patient had to go back to [facility] for IV line in left arm to continue IV antibiotics. Patient is currently on Medrol taper dosage. She states she is compliant with the nebulizers and inhalers and feels that shortness of breath and cough has improved. 2) Anemia: Patient recently had blood work with her PCP and was noted to have a hemoglobin of 10.9. She states she is not aware that she is anemic and is here to get further worked up.  Patient has history of MGUS which has been stable. 3) She underwent aortic valve replacement and ascending aortic repair with stable dissection in the arch extending to the aortic bifurcation with Dr. Tan Cabrera 4) Anal cell CA: Last CT on 02/2022 revealed no evidence of metastatic disease. No major complaints. Denies blood in colostomy.  5/16/24 He was seen by Dr. Valentino Monae (Immunology) for consideration of IVIG last week and underwent labs which was concerning for "multiple myeloma". Patient was sent to hematology for further workup. Labs revealed: IgA 884, IgG 850 She was found to have seizure on EEG due to tremors and loss of words. Patient was started on Keppra 750 mg PO BID. Currently also on Gabapentin for history of neuropathy. Will need to r/o multiple myeloma. Labs and PET/CT ordered.  [de-identified] : Pulmonary: Eulalio Allison  [phone]\par  GI surgeon: Terrell Luong; [phone]\par  PCP: Anastasiya Jin [phone] \par  Cardiologist: Steven Leahy [phone]\par  Radiation Oncology: Amanda Burger and Allie Mart\par  Colonoscopy: Rafael\par  Wound: Huma Gray [phone]\par  \par  [facility] doctors: \par  PCP/Cardiology: Jordi Engel\par  Thoracic Surgeon: Zohaib Zapien

## 2024-05-16 NOTE — PHYSICAL EXAM
[Ambulatory and capable of all self care but unable to carry out any work activities] : Status 2- Ambulatory and capable of all self care but unable to carry out any work activities. Up and about more than 50% of waking hours [Thin] : thin [Normal] : affect appropriate [de-identified] : Left eye enucleation [FreeTextEntry1] : Stoma is functioning well. Small opening at anal site, and no discharge.  Small 0.8 cm X 0.8 cm raised lesion proximal to anal canal located ~ 3-4 cm proximal to anal canal. [de-identified] : mild wheeze; upper airway sounds transmitted.  [de-identified] : non-focal

## 2024-05-16 NOTE — ASSESSMENT
[FreeTextEntry1] : Patient recently had blood work with her PCP and was noted to have a hemoglobin of 10.9. She states she is not aware that she is anemic and is here to get further worked up.  Patient has history of MGUS which has been stable.  She was seen by Dr. Valentino Monae (Immunology) for consideration of IVIG last week and underwent labs which was concerning for "multiple myeloma".  Patient was sent to hematology for further workup. Labs revealed: IgA 884, IgG 850  Will need to r/o multiple myeloma.  Labs and PET/CT ordered.  She will require Bone marrow biopsy.  Will arrange.  Will need to contact daughter (Zoe Flores) @ [phone]

## 2024-05-16 NOTE — REVIEW OF SYSTEMS
[Negative] : Allergic/Immunologic [FreeTextEntry6] : chronic cough - has been better [FreeTextEntry8] : burning [de-identified] : She walks without cane; gait off balance.

## 2024-05-17 LAB
ALBUMIN SERPL ELPH-MCNC: 4 G/DL
ALP BLD-CCNC: 129 U/L
ALT SERPL-CCNC: 15 U/L
ANION GAP SERPL CALC-SCNC: 15 MMOL/L
AST SERPL-CCNC: 18 U/L
B2 MICROGLOB SERPL-MCNC: 2 MG/L
BILIRUB SERPL-MCNC: <0.2 MG/DL
BUN SERPL-MCNC: 17 MG/DL
CALCIUM SERPL-MCNC: 9.4 MG/DL
CHLORIDE SERPL-SCNC: 101 MMOL/L
CO2 SERPL-SCNC: 23 MMOL/L
CREAT SERPL-MCNC: 0.71 MG/DL
CRP SERPL-MCNC: 5 MG/L
EGFR: 89 ML/MIN/1.73M2
GLUCOSE SERPL-MCNC: 226 MG/DL
KAPPA LC 24H UR QL: PRESENT
LDH SERPL-CCNC: 278 U/L
POTASSIUM SERPL-SCNC: 4.6 MMOL/L
PROT SERPL-MCNC: 6.9 G/DL
SODIUM SERPL-SCNC: 139 MMOL/L

## 2024-05-18 LAB
CREAT SPEC-SCNC: 168 MG/DL
CREAT/PROT UR: 0.2 RATIO
PROT UR-MCNC: 29 MG/DL

## 2024-05-20 NOTE — H&P ADULT - GUM GEN PE MLT EXAM PC
COVID and Flu in progress...  
POCT Rapid Covid-19 swab currently in process. Awaiting results.   POCT Influenza A/B Molecular swab currently in process. Awaiting results.     
Pt and family unable to be located in [facility].   
Pt found in lobby, placed in room.   
Pt to ED with parent, c/o sore throat since last PM. Pt is currently requesting juice. No stridor noted, managing oral secretions well. No resp distress. AAOx4.   
patient refused

## 2024-05-21 ENCOUNTER — RESULT REVIEW (OUTPATIENT)
Age: 74
End: 2024-05-21

## 2024-05-21 ENCOUNTER — APPOINTMENT (OUTPATIENT)
Dept: HEMATOLOGY ONCOLOGY | Facility: CLINIC | Age: 74
End: 2024-05-21
Payer: MEDICARE

## 2024-05-21 ENCOUNTER — LABORATORY RESULT (OUTPATIENT)
Age: 74
End: 2024-05-21

## 2024-05-21 VITALS
TEMPERATURE: 98 F | DIASTOLIC BLOOD PRESSURE: 80 MMHG | BODY MASS INDEX: 23.81 KG/M2 | SYSTOLIC BLOOD PRESSURE: 174 MMHG | RESPIRATION RATE: 16 BRPM | OXYGEN SATURATION: 96 % | HEART RATE: 79 BPM | WEIGHT: 151.99 LBS

## 2024-05-21 DIAGNOSIS — C21.1 MALIGNANT NEOPLASM OF ANAL CANAL: ICD-10-CM

## 2024-05-21 DIAGNOSIS — D47.2 MONOCLONAL GAMMOPATHY: ICD-10-CM

## 2024-05-21 LAB
ALBUMIN MFR SERPL ELPH: 54.9 %
ALBUMIN SERPL-MCNC: 3.8 G/DL
ALBUMIN/GLOB SERPL: 1.2 RATIO
ALPHA1 GLOB MFR SERPL ELPH: 4.9 %
ALPHA1 GLOB SERPL ELPH-MCNC: 0.3 G/DL
ALPHA2 GLOB MFR SERPL ELPH: 10 %
ALPHA2 GLOB SERPL ELPH-MCNC: 0.7 G/DL
B-GLOBULIN MFR SERPL ELPH: 20.5 %
B-GLOBULIN SERPL ELPH-MCNC: 1.4 G/DL
BASOPHILS # BLD AUTO: 0.03 K/UL — SIGNIFICANT CHANGE UP (ref 0–0.2)
BASOPHILS NFR BLD AUTO: 0.2 % — SIGNIFICANT CHANGE UP (ref 0–2)
DEPRECATED KAPPA LC FREE/LAMBDA SER: 1.89 RATIO
EOSINOPHIL # BLD AUTO: 0.11 K/UL — SIGNIFICANT CHANGE UP (ref 0–0.5)
EOSINOPHIL NFR BLD AUTO: 0.9 % — SIGNIFICANT CHANGE UP (ref 0–6)
GAMMA GLOB FLD ELPH-MCNC: 0.7 G/DL
GAMMA GLOB MFR SERPL ELPH: 9.7 %
HCT VFR BLD CALC: 37.1 % — SIGNIFICANT CHANGE UP (ref 34.5–45)
HGB BLD-MCNC: 11.3 G/DL — LOW (ref 11.5–15.5)
IGA SER QL IEP: 687 MG/DL
IGG SER QL IEP: 681 MG/DL
IGM SER QL IEP: 101 MG/DL
IMM GRANULOCYTES NFR BLD AUTO: 0.8 % — SIGNIFICANT CHANGE UP (ref 0–0.9)
INTERPRETATION SERPL IEP-IMP: NORMAL
KAPPA LC CSF-MCNC: 1.21 MG/DL
KAPPA LC SERPL-MCNC: 2.29 MG/DL
LYMPHOCYTES # BLD AUTO: 1.91 K/UL — SIGNIFICANT CHANGE UP (ref 1–3.3)
LYMPHOCYTES # BLD AUTO: 14.8 % — SIGNIFICANT CHANGE UP (ref 13–44)
M PROTEIN MFR SERPL ELPH: 9.7 %
M PROTEIN SPEC IFE-MCNC: NORMAL
MCHC RBC-ENTMCNC: 20.5 PG — LOW (ref 27–34)
MCHC RBC-ENTMCNC: 30.5 G/DL — LOW (ref 32–36)
MCV RBC AUTO: 67.2 FL — LOW (ref 80–100)
MONOCLON BAND OBS SERPL: 0.7 G/DL
MONOCYTES # BLD AUTO: 1.29 K/UL — HIGH (ref 0–0.9)
MONOCYTES NFR BLD AUTO: 10 % — SIGNIFICANT CHANGE UP (ref 2–14)
NEUTROPHILS # BLD AUTO: 9.43 K/UL — HIGH (ref 1.8–7.4)
NEUTROPHILS NFR BLD AUTO: 73.3 % — SIGNIFICANT CHANGE UP (ref 43–77)
NRBC # BLD: 0 /100 WBCS — SIGNIFICANT CHANGE UP (ref 0–0)
NRBC BLD-RTO: 0 /100 WBCS — SIGNIFICANT CHANGE UP (ref 0–0)
PLATELET # BLD AUTO: 295 K/UL — SIGNIFICANT CHANGE UP (ref 150–400)
PROT SERPL-MCNC: 6.9 G/DL
PROT SERPL-MCNC: 6.9 G/DL
RBC # BLD: 5.52 M/UL — HIGH (ref 3.8–5.2)
RBC # FLD: 20.1 % — HIGH (ref 10.3–14.5)
WBC # BLD: 12.87 K/UL — HIGH (ref 3.8–10.5)
WBC # FLD AUTO: 12.87 K/UL — HIGH (ref 3.8–10.5)

## 2024-05-21 PROCEDURE — 38222 DX BONE MARROW BX & ASPIR: CPT | Mod: RT

## 2024-05-21 NOTE — REASON FOR VISIT
[Bone Marrow Biopsy] : bone marrow biopsy [Bone Marrow Aspiration] : bone marrow aspiration [Family Member] : family member [FreeTextEntry2] : 72 yo F w/ IgA monoclonal gammopathy. R/o MM

## 2024-05-28 ENCOUNTER — NON-APPOINTMENT (OUTPATIENT)
Age: 74
End: 2024-05-28

## 2024-05-28 ENCOUNTER — APPOINTMENT (OUTPATIENT)
Dept: NUCLEAR MEDICINE | Facility: IMAGING CENTER | Age: 74
End: 2024-05-28

## 2024-05-29 ENCOUNTER — RX RENEWAL (OUTPATIENT)
Age: 74
End: 2024-05-29

## 2024-05-29 RX ORDER — IPRATROPIUM BROMIDE AND ALBUTEROL SULFATE 2.5; .5 MG/3ML; MG/3ML
0.5-2.5 (3) SOLUTION RESPIRATORY (INHALATION)
Qty: 1080 | Refills: 0 | Status: ACTIVE | COMMUNITY
Start: 2024-05-29 | End: 1900-01-01

## 2024-05-31 ENCOUNTER — APPOINTMENT (OUTPATIENT)
Dept: NUCLEAR MEDICINE | Facility: CLINIC | Age: 74
End: 2024-05-31
Payer: MEDICARE

## 2024-05-31 ENCOUNTER — NON-APPOINTMENT (OUTPATIENT)
Age: 74
End: 2024-05-31

## 2024-05-31 PROCEDURE — 78816 PET IMAGE W/CT FULL BODY: CPT | Mod: PI

## 2024-05-31 PROCEDURE — A9552: CPT

## 2024-06-04 ENCOUNTER — NON-APPOINTMENT (OUTPATIENT)
Age: 74
End: 2024-06-04

## 2024-06-05 ENCOUNTER — APPOINTMENT (OUTPATIENT)
Dept: VASCULAR SURGERY | Facility: CLINIC | Age: 74
End: 2024-06-05
Payer: MEDICARE

## 2024-06-05 ENCOUNTER — APPOINTMENT (OUTPATIENT)
Dept: HEMATOLOGY ONCOLOGY | Facility: CLINIC | Age: 74
End: 2024-06-05
Payer: MEDICARE

## 2024-06-05 VITALS
OXYGEN SATURATION: 98 % | BODY MASS INDEX: 23.58 KG/M2 | RESPIRATION RATE: 18 BRPM | TEMPERATURE: 97.3 F | HEART RATE: 68 BPM | SYSTOLIC BLOOD PRESSURE: 162 MMHG | WEIGHT: 151.99 LBS | HEIGHT: 67.5 IN | DIASTOLIC BLOOD PRESSURE: 77 MMHG

## 2024-06-05 VITALS
HEART RATE: 74 BPM | BODY MASS INDEX: 23.86 KG/M2 | DIASTOLIC BLOOD PRESSURE: 70 MMHG | WEIGHT: 152 LBS | OXYGEN SATURATION: 99 % | SYSTOLIC BLOOD PRESSURE: 136 MMHG | RESPIRATION RATE: 16 BRPM | TEMPERATURE: 98.4 F | HEIGHT: 67 IN

## 2024-06-05 DIAGNOSIS — D72.9 DISORDER OF WHITE BLOOD CELLS, UNSPECIFIED: ICD-10-CM

## 2024-06-05 DIAGNOSIS — C21.0 MALIGNANT NEOPLASM OF ANUS, UNSPECIFIED: ICD-10-CM

## 2024-06-05 DIAGNOSIS — D80.1 NONFAMILIAL HYPOGAMMAGLOBULINEMIA: ICD-10-CM

## 2024-06-05 DIAGNOSIS — D64.9 ANEMIA, UNSPECIFIED: ICD-10-CM

## 2024-06-05 DIAGNOSIS — I73.9 PERIPHERAL VASCULAR DISEASE, UNSPECIFIED: ICD-10-CM

## 2024-06-05 PROCEDURE — 99213 OFFICE O/P EST LOW 20 MIN: CPT

## 2024-06-05 PROCEDURE — 93925 LOWER EXTREMITY STUDY: CPT

## 2024-06-05 PROCEDURE — 99205 OFFICE O/P NEW HI 60 MIN: CPT

## 2024-06-05 NOTE — RESULTS/DATA
[FreeTextEntry1] : 6/5/2024 HGB 11.3  Cr 0.71 Ca 9.4 M-spike -- 0.7 g/dL  IgA Kappa  FLCR 1.89 (0.26-1.65) _____________________________________________________________ PATHOLOGY Bone marrow biopsy (5/21/2024)  Patient:     RAYRAY RODRIGUEZ   Accession:                             [id number]  Collected Date/Time:                   5/21/2024 14:04 EDT Received Date/Time:                    5/21/2024 14:05 EDT  Hematopathology Report - Auth (Verified)  Specimen(s) Submitted 1  Right pic bone marrow biopsy 2  Bone marrow aspirate  Final Diagnosis 1, 2. Bone marrow biopsy and bone marrow aspirate      -Mild perivascular plasmacytosis, 5 to 8% of cellularity by immunohistochemistry, approximately 7% by bone marrow aspirate (monotypic  cytoplasmic kappa by flow cytometry)      -Trilineage hematopoiesis with maturation and iron stores not seen  See note and description.  Diagnostic note: As per chart review, the patient has multiple medical problems including treatment for anal squamous cell carcinoma, currently with no evidence of metastatic disease, anemia; IgA kappa MGUS noted in 1/2021, 0.3 g/dL, now present 0.7 g/dL, with weak positive Bence Shell kappa protein in urine. CBC shows mild neutrophilia, mild monocytosis,    thalassemic erythroid picture with mild anemia (history of normal ferritin and A2; most likely alpha thalassemia trait). The bone marrow biopsy shows normocellularity with trilineage hematopoiesis, focal mild perivascular plasmacytosis and iron stores not seen.  Aspicular aspirate shows presence of plasma cells, approximately 7%.  Flow cytometry shows a monotypic cytoplasmic kappa positive plasma cell population.  Immunohistochemical stains show mild perivascular plasmacytosis, 5 to 8%. Criteria for plasma cell neoplasm are not met, supporting monoclonal gammopathy of undetermined significance. Correlation with relevant clinical, laboratory (monoclonal protein type and amount) and radiologic imaging is suggested, and follow-up. Correlation with cytogenetics and myeloma FISH is pending. Comprehensive report with results of pending ancillary studies to follow.  Dr. Elam was informed of the diagnosis via encrypted email on 05/24/2024.  Ancillary studies Bone marrow aspirate iron stain:   No spicules are present to evaluate for iron stores and there are insufficient nRBC to evaluate for ring sideroblasts.  Congo red stain is negative.  Flow cytometry: Monotypic plasma cells (0.58% of cells;  0.58% cytoplasmic kappa, 0.02% cytoplasmic lambda), positive for cytoplasmic kappa, CD38, , dimmer CD45, CD27, CD81; negative CD19, CD20, , CD56. The lymphocyte immunophenotypic findings show no diagnostic abnormalities with increased plasma cell population. The myeloid immunophenotypic findings show no increase in myeloid immaturity  (less than 0.1% of cells), normal proportion and immunophenotype of monocytes.  Immunohistochemical stains  (block 1A: , cyclin D1) show mild perivascular plasmacytosis, 5-8% of cellularity. Cyclin D1 is noncontributory.  NORMAL FISH NORMAL CYTOGENETICS  ------------------------------------------------------------------------------ IMAGING  EXAM: [id number] - PETCT WB ONC FDG INIT  - ORDERED BY: SHANNON ELAM   PROCEDURE DATE:  05/31/2024    INTERPRETATION:  CLINICAL INFORMATION: Rising IgA, concern for multiple myeloma; history anal cancer, S/P resection with left anterior wall colostomy.  TREATMENT STRATEGY EVALUATION: Initial FASTING BLOOD SUGAR: 104 mg/dl RADIOPHARMACEUTICAL: 11.05 mCi F-18 FDG, I.V. I.V. SITE: hand left UPTAKE PERIOD: 63 min SCANNER: Siemens Biograph Vision 450 ORAL CONTRAST: Smoothie Readi-Cat 2 PHARMACOLOGIC INTERVENTION: None.  TECHNIQUE: Following intravenous injection of radiopharmaceutical and above uptake period, PET/CT was obtained from vertex of skull to feet. CT protocol was optimized for PET attenuation correction and anatomic localization and was not designed to produce and cannot replace state-of-the-art diagnostic CT images with specific imaging protocols for different body parts and indications. Images were reconstructed and reviewed in axial, coronal and sagittal views and three-dimensional MIP.  The standardized uptake values (SUV) are normalized to patient body weight and indicate the highest activity concentration (SUVmax) in a given site. All image numbers refer to axial image number.  COMPARISON:  FDG PET/CT 4/18/2019.  OTHER STUDIES USED FOR CORRELATION: CT chest 05/03/2024  FINDINGS:  HEAD/NECK: Physiologic FDG activity in visualized brain, head, and neck. Nonavid bilateral maxillary sinus opacities and thickening.  THORAX: No abnormal FDG activity. No lymphadenopathy.  LUNGS: No abnormal FDG activity. Milder degree of tree-in-bud densities in the right lower lobe from the most recent CT chest. Stable atelectatic changes lingula.  PLEURA/PERICARDIUM: No abnormal FDG activity. No effusion.  HEPATOBILIARY/PANCREAS: Physiologic FDG activity.  For reference, normal liver demonstrates SUV mean 2.6.  SPLEEN: Physiologic FDG activity. Normal in size.  ADRENAL GLANDS: No abnormal FDG activity. No nodule.  KIDNEYS/URINARY BLADDER: Physiologic excreted FDG activity.  REPRODUCTIVE ORGANS: No abnormal FDG activity.. Hysterectomy  ABDOMINOPELVIC LYMPH NODES/RETROPERITONEUM: No enlarged or FDG-avid lymph node.  ESOPHAGUS/STOMACH/BOWEL/PERITONEUM/MESENTERY: Small hiatal hernia with nonspecific activity in the distal esophagus. Left lower quadrant colostomy. No suspicious FDG avid tissue in the presacral region.  VESSELS: Atherosclerotic changes in the aorta and its branches. Aortic valve replacement and ascending aortic graft.  BONES/SOFT TISSUES: No evidence of FDG avid lytic or lytic/sclerotic lesion that is compatible with active multiple myeloma. Scattered and unchanged nonavid sclerosis in the right clavicle, left scapula spine, pelvic bones, both femoral heads. No abnormal activity at surgical sites of median sternotomy; orthopedic hardware in the symphysis pubis and nail across the right iliac/sacrum; bilateral TKR.  Several muscles in the face and neck, upper torso and paraspinal region, lower extremity and plantar soft tissue under the right foot with nonspecific activity compatible with exertional strain.  IMPRESSION: 1. No evidence of active multiple myeloma.  2. No suspicious FDG avid malignant lesion in the remaining field of view.  --- End of Report ---

## 2024-06-05 NOTE — ASSESSMENT
[FreeTextEntry1] : 73-year-old Ms. RODRIGUEZ is seen in consult for possible multiple myeloma as suspected by her immunologist. An array of labs, pathology, molecular genetics, imaging study reviewed with the patient and her daughter who joined the visit over telephone. She has IgA kappa MGUS, not multiple myeloma. Also discussed the incidence and natural history of MGUS. The patient and her daughter voiced understanding. The patient is cleared for IVIG treatment as indicated by immunologist. Patient may f/u at hematology in 6 months to a year.

## 2024-06-05 NOTE — HISTORY OF PRESENT ILLNESS
[de-identified] : (6/5/2024) 73-year-old Ms. RODRIGUEZ is seen in consult for suspected multiple myeloma (MM). Patient was seen by immunologist who told her that she has MM. Patient has been having infection and allergy frequently and she was planned to be treated with IVIG. However, it was deferred as the labs appeared to be suspicious for MM as noted by the immunologist. The patient has h/o anal cancer (SCC) in remission since 2016. Her OLMAN is suggestive of MGUS. She underwent a bone marrow aspirate and biopsy that did ot show >10% monotypic plasma cells. A PET CT did not show any FDG avid intra or extra medullary lesion or any Lasha lesion. There is no evidence of multiple myeloma in the array of MM w/u.

## 2024-06-06 ENCOUNTER — NON-APPOINTMENT (OUTPATIENT)
Age: 74
End: 2024-06-06

## 2024-06-06 ENCOUNTER — APPOINTMENT (OUTPATIENT)
Dept: NEUROLOGY | Facility: CLINIC | Age: 74
End: 2024-06-06
Payer: MEDICARE

## 2024-06-06 PROCEDURE — 95816 EEG AWAKE AND DROWSY: CPT

## 2024-06-07 ENCOUNTER — APPOINTMENT (OUTPATIENT)
Dept: PULMONOLOGY | Facility: CLINIC | Age: 74
End: 2024-06-07
Payer: MEDICARE

## 2024-06-07 VITALS
RESPIRATION RATE: 16 BRPM | WEIGHT: 151 LBS | HEIGHT: 67.5 IN | OXYGEN SATURATION: 95 % | SYSTOLIC BLOOD PRESSURE: 150 MMHG | BODY MASS INDEX: 23.42 KG/M2 | TEMPERATURE: 97.3 F | HEART RATE: 75 BPM | DIASTOLIC BLOOD PRESSURE: 80 MMHG

## 2024-06-07 PROCEDURE — 95700 EEG CONT REC W/VID EEG TECH: CPT

## 2024-06-07 PROCEDURE — 95708 EEG WO VID EA 12-26HR UNMNTR: CPT

## 2024-06-07 PROCEDURE — 96372 THER/PROPH/DIAG INJ SC/IM: CPT

## 2024-06-07 PROCEDURE — 95719 EEG PHYS/QHP EA INCR W/O VID: CPT

## 2024-06-07 RX ORDER — TEZEPELUMAB-EKKO 210 MG/1.9ML
210 INJECTION, SOLUTION SUBCUTANEOUS
Qty: 0 | Refills: 0 | Status: COMPLETED | OUTPATIENT
Start: 2024-06-07

## 2024-06-11 PROBLEM — I73.9 PAD (PERIPHERAL ARTERY DISEASE): Status: ACTIVE | Noted: 2024-06-11

## 2024-06-11 RX ORDER — APIXABAN 5 MG/1
5 TABLET, FILM COATED ORAL
Qty: 180 | Refills: 3 | Status: DISCONTINUED | COMMUNITY
Start: 2024-05-02 | End: 2024-06-11

## 2024-06-11 RX ORDER — ACETYLCYSTEINE 200 MG/ML
20 SOLUTION ORAL; RESPIRATORY (INHALATION)
Qty: 1 | Refills: 5 | Status: DISCONTINUED | COMMUNITY
Start: 2023-06-16 | End: 2024-06-11

## 2024-06-11 NOTE — PHYSICAL EXAM
[0] : left 0 [2+] : left 2+ [Calm] : calm [de-identified] : Well-appearing  [de-identified] : EOMI, anicteric [de-identified] : Motor and sensory intact in all 4 extremities [de-identified] : No wounds on bilateral feet [de-identified] : A&Ox4

## 2024-06-11 NOTE — CONSULT LETTER
[Dear  ___] : Dear  [unfilled], [Consult Letter:] : I had the pleasure of evaluating your patient, [unfilled]. [Please see my note below.] : Please see my note below. [Consult Closing:] : Thank you very much for allowing me to participate in the care of this patient.  If you have any questions, please do not hesitate to contact me. [Sincerely,] : Sincerely, [FreeTextEntry1] : She presented to me for evaluation of nonhealing right foot wounds secondary to pressure ulceration.  On exam, she has palpable femoral, popliteal, dorsalis pedis pulses bilaterally.  She has a 1.5 cm ulceration on the medial dorsal aspect of her right foot.  There is no signs of overlying infection.  There is scant clear drainage.  I reviewed an ODILON that was performed May 2022.  The right ODILON is 1.45 with a normal right toe brachial index.  Pulse volume recordings demonstrate normal amplitudes at all levels without evidence of significant arterial occlusive disease in either lower extremity at rest.  Given this, I am recommending continued local wound care with close follow-up.  At this time no surgical intervention is needed.  I will plan to see her again in 3 months.  I have discussed with the patient that if the wound does not heal, she may need a lower extremity angiogram for further evaluation. [FreeTextEntry3] : \par  \par  \par  \par  Rosa Isela Recinos MD, RPVI\par  Division of Vascular & Endovascular Surgery\par  [facility], Department of Surgery

## 2024-06-11 NOTE — HISTORY OF PRESENT ILLNESS
[FreeTextEntry1] : RAYRAY RODRIGUEZ is a 71 year old female who presents for evaluation of nonhealing right foot wound.   Referred by Dr. Love (podiatry).  Patient states that in January 2022 with difficulty breathing and required antibiotics for pneumonia. She had to be hospitalized in March 2022 for pneumonia. She was eventually discharged to rehab. In rehab, she had heel offloading boots which rubbed her bilateral feet. She initially had five wounds on the right foot, four of them have healed but she has a persistent right medial ankle wound. 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 She has been following with Dr. Love for the foot wound. She also follows with Dr. Gray (wound care) for sacral wound.   + PMH: asthma, COPD, diabetes, tracheobronchomalacia, HTN, GERD, atrial fibrillation, lung nodule, colorectal cancer, mod-severe AI + PSH: s/p tracheobronchoplasty, s/p colon resection with colostomy + FH: cancer, diabetes, hypertension + SH: never smoker, no etoh use + Aller: cefepime, pencillin, ampicillin, aspirin, codeine, dialudid, iodine, oxycontin, tetanus toxoid, valium  PCP is Dr. Bon Samuels.  [de-identified] : 6/5/2024 - Pt presents for follow up. 2-3 weeks ago, patient had significant left foot pain and first toe. Now, no longer having pain. Reports swelling of the left toe and foot. She reports evaluation by Dr. Love. Per patient, she underwent an xray and was referred to vascular surgery.   Also diagnosed with seizure disorder since our last visit and she is now on keppra.

## 2024-06-11 NOTE — DATA REVIEWED
[FreeTextEntry1] : 6/5/2024-bilateral arterial duplex No evidence of significant arterial occlusive disease in bilateral legs. Three-vessel runoff noted bilaterally.  Reviewed ODILON/PVRs performed May 13, 2022.  Right ODILON is 1.45, left ODILON cannot be obtained due to noncompressible vessels.  Right TBI 0.78 in the left TBI is 1.19.  Although this is a limited study due to vessel noncompressibility, pulse volume recordings demonstrate normal amplitudes at all levels without evidence of significant arterial occlusive disease in either lower extremity at rest.

## 2024-06-11 NOTE — ASSESSMENT
[FreeTextEntry1] : RAYRAY RODRIGUEZ is a 73 year old female presents for evaluation  > H/o left foot pain - Palpable DP pulses and arterial duplex w/o stenoses or occlusions.  - Left first toe pain now resolved. ?Gout as etiology. As it is now resolved, no further workup from vascular.  - Follow up as needed.

## 2024-06-20 ENCOUNTER — APPOINTMENT (OUTPATIENT)
Dept: THORACIC SURGERY | Facility: CLINIC | Age: 74
End: 2024-06-20
Payer: MEDICARE

## 2024-06-20 VITALS
WEIGHT: 150 LBS | BODY MASS INDEX: 23.27 KG/M2 | HEART RATE: 76 BPM | RESPIRATION RATE: 17 BRPM | HEIGHT: 67.5 IN | OXYGEN SATURATION: 98 % | DIASTOLIC BLOOD PRESSURE: 85 MMHG | SYSTOLIC BLOOD PRESSURE: 163 MMHG

## 2024-06-20 DIAGNOSIS — J47.9 BRONCHIECTASIS, UNCOMPLICATED: ICD-10-CM

## 2024-06-20 PROCEDURE — 99215 OFFICE O/P EST HI 40 MIN: CPT

## 2024-06-21 PROBLEM — J47.9 BRONCHIECTASIS: Status: ACTIVE | Noted: 2017-12-04

## 2024-06-21 NOTE — PHYSICAL EXAM
[Ambulatory and capable of all self care but unable to carry out any work activities] : Status 2- Ambulatory and capable of all self care but unable to carry out any work activities. Up and about more than 50% of waking hours [General Appearance - Alert] : alert [General Appearance - In No Acute Distress] : in no acute distress [Outer Ear] : the ears and nose were normal in appearance [Oropharynx] : the oropharynx was normal [Neck Appearance] : the appearance of the neck was normal [Neck Cervical Mass (___cm)] : no neck mass was observed [Jugular Venous Distention Increased] : there was no jugular-venous distention [Thyroid Diffuse Enlargement] : the thyroid was not enlarged [Thyroid Nodule] : there were no palpable thyroid nodules [Heart Rate And Rhythm] : heart rate was normal and rhythm regular [Heart Sounds] : normal S1 and S2 [Heart Sounds Gallop] : no gallops [Murmurs] : no murmurs [Heart Sounds Pericardial Friction Rub] : no pericardial rub [Examination Of The Chest] : the chest was normal in appearance [Chest Visual Inspection Thoracic Asymmetry] : no chest asymmetry [Diminished Respiratory Excursion] : normal chest expansion [2+] : left 2+ [Breast Palpation Mass] : no palpable masses [Breast Appearance] : normal in appearance [Bowel Sounds] : normal bowel sounds [Abdomen Soft] : soft [Abdomen Tenderness] : non-tender [Abdomen Mass (___ Cm)] : no abdominal mass palpated [FreeTextEntry1] : deferred [Cervical Lymph Nodes Enlarged Posterior Bilaterally] : posterior cervical [Cervical Lymph Nodes Enlarged Anterior Bilaterally] : anterior cervical [Supraclavicular Lymph Nodes Enlarged Bilaterally] : supraclavicular [No CVA Tenderness] : no ~M costovertebral angle tenderness [No Spinal Tenderness] : no spinal tenderness [Nail Clubbing] : no clubbing  or cyanosis of the fingernails [Abnormal Walk] : normal gait [Musculoskeletal - Swelling] : no joint swelling seen [Motor Tone] : muscle strength and tone were normal [Skin Color & Pigmentation] : normal skin color and pigmentation [Skin Turgor] : normal skin turgor [] : no rash [Deep Tendon Reflexes (DTR)] : deep tendon reflexes were 2+ and symmetric [Sensation] : the sensory exam was normal to light touch and pinprick [No Focal Deficits] : no focal deficits [Oriented To Time, Place, And Person] : oriented to person, place, and time [Impaired Insight] : insight and judgment were intact [Affect] : the affect was normal

## 2024-06-21 NOTE — ASSESSMENT
[FreeTextEntry1] : Ms. RAYRAY RODRIGUEZ, 73 year old female, never smoker, w/ hx of HTN, A-Fib, DVT, stroke, rectal CA s/p resection with permanent colostomy, followed by chemo and XRT, aortic dissection repair, s/p TAVR, TBM s/p tracheoplasty with Dr. Chakraborty, COPD, bronchiectasis, severe Asthma, chronic GERD, TB treated, seizures, MGUS, who presented worsening SOB and cough, referred by Dr. Eulalio Allison (Pulm) for bronchoscopy.   PFTs on 4/11/24: FVC 78%, FEV1 49%, DLCO 79%.  PET/CT on 5/31/24: - Milder degree of tree-in-bud densities in the right lower lobe from the most recent CT chest. Stable atelectatic changes lingula. - No abnormal FDG activity. No effusion. - Physiologic FDG activity.  For reference, normal liver demonstrates SUV mean 2.6. - Small hiatal hernia with nonspecific activity in the distal esophagus. Left lower quadrant colostomy.  I have reviewed the patient's medical records and diagnostic images at time of this office consultation and have made the following recommendation: 1. CT reviewed, will schedule for Flex bronchoscopy on 7/11/24. Risks of surgery includes but not limited to pain, infection, bleeding, injuries to surrounding structures, and need for further procedure, stroke or death. Benefits and alternatives explained to patient, all questions answered, patient agreed to proceed with surgery. 2. Medical, cardiac and neuro clearance, PST 3. Hold Eliquis 3 days prior, hold Plavix 5 days prior. (instruction given to daughter Zoe)   I, WELLINGTON Aguilar, personally performed the evaluation and management (E/M) services for this new patient.  That E/M includes conducting the initial examination, assessing all conditions, and establishing the plan of care.  Today, my ACP, JACOBY Gallardo was here to observe my evaluation and management services for this patient to be followed going forward.

## 2024-06-21 NOTE — REVIEW OF SYSTEMS
[As Noted in HPI] : as noted in HPI [Shortness Of Breath] : shortness of breath [Cough] : cough [Wheezing] : wheezing [SOB on Exertion] : shortness of breath during exertion [Negative] : Heme/Lymph

## 2024-06-21 NOTE — HISTORY OF PRESENT ILLNESS
[FreeTextEntry1] : Ms. RAYRAY RODRIGUEZ, 73 year old female, never smoker, w/ hx of HTN, A-Fib, DVT, stroke, rectal CA s/p resection with permanent colostomy, followed by chemo and XRT, aortic dissection repair, s/p TAVR, TBM s/p tracheoplasty with Dr. Chakraborty, COPD, bronchiectasis, severe Asthma, chronic GERD, TB treated, seizures, MGUS, who presented worsening SOB and cough, referred by Dr. Eulalio Allison (Pulm) for bronchoscopy.   PFTs on 4/11/24: FVC 78%, FEV1 49%, DLCO 79%.  PET/CT on 5/31/24: - Milder degree of tree-in-bud densities in the right lower lobe from the most recent CT chest. Stable atelectatic changes lingula. - No abnormal FDG activity. No effusion. - Physiologic FDG activity.  For reference, normal liver demonstrates SUV mean 2.6. - Small hiatal hernia with nonspecific activity in the distal esophagus. Left lower quadrant colostomy.  Patient is here today for CT Sx consultation. Pt admits to trouble breathing, pressure on chest and cough with mucus.

## 2024-06-21 NOTE — DATA REVIEWED
[FreeTextEntry1] : I have independently reviewed the following: PFTs on 4/11/24: FVC 78%, FEV1 49%, DLCO 79%. PET/CT on 5/31/24

## 2024-06-21 NOTE — CONSULT LETTER
[Consult Letter:] : I had the pleasure of evaluating your patient, [unfilled]. [Please see my note below.] : Please see my note below. [( Thank you for referring [unfilled] for consultation for _____ )] : Thank you for referring [unfilled] for consultation for [unfilled] [Consult Closing:] : Thank you very much for allowing me to participate in the care of this patient.  If you have any questions, please do not hesitate to contact me. [Sincerely,] : Sincerely, [Dear  ___] : Dear  [unfilled], [FreeTextEntry2] : Dr. Eulalio Allison (Pulm/Referring) [FreeTextEntry3] : Valentino Nguyen MD, MPH  System Director of Thoracic Surgery  Director of Comprehensive Lung and Foregut [place]  Professor Cardiovascular & Thoracic Surgery   [facility] [employer] [facility] [id number] [place] [address] [place] [address] Tel: [phone] Fax: [phone]

## 2024-06-24 ENCOUNTER — APPOINTMENT (OUTPATIENT)
Dept: INTERNAL MEDICINE | Facility: CLINIC | Age: 74
End: 2024-06-24
Payer: MEDICARE

## 2024-06-24 ENCOUNTER — TRANSCRIPTION ENCOUNTER (OUTPATIENT)
Age: 74
End: 2024-06-24

## 2024-06-24 ENCOUNTER — APPOINTMENT (OUTPATIENT)
Dept: NEUROLOGY | Facility: CLINIC | Age: 74
End: 2024-06-24

## 2024-06-24 ENCOUNTER — OUTPATIENT (OUTPATIENT)
Dept: OUTPATIENT SERVICES | Facility: HOSPITAL | Age: 74
LOS: 1 days | End: 2024-06-24
Payer: MEDICARE

## 2024-06-24 VITALS
DIASTOLIC BLOOD PRESSURE: 80 MMHG | OXYGEN SATURATION: 98 % | SYSTOLIC BLOOD PRESSURE: 130 MMHG | WEIGHT: 150 LBS | BODY MASS INDEX: 23.27 KG/M2 | HEART RATE: 77 BPM | HEIGHT: 67.5 IN

## 2024-06-24 VITALS
HEIGHT: 67.5 IN | SYSTOLIC BLOOD PRESSURE: 144 MMHG | WEIGHT: 150 LBS | DIASTOLIC BLOOD PRESSURE: 76 MMHG | BODY MASS INDEX: 23.27 KG/M2 | HEART RATE: 76 BPM

## 2024-06-24 DIAGNOSIS — Z98.89 OTHER SPECIFIED POSTPROCEDURAL STATES: Chronic | ICD-10-CM

## 2024-06-24 DIAGNOSIS — G40.909 EPILEPSY, UNSPECIFIED, NOT INTRACTABLE, W/OUT STATUS EPILEPTICUS: ICD-10-CM

## 2024-06-24 DIAGNOSIS — Z01.818 ENCOUNTER FOR OTHER PREPROCEDURAL EXAMINATION: ICD-10-CM

## 2024-06-24 DIAGNOSIS — Z87.09 PERSONAL HISTORY OF OTHER DISEASES OF THE RESPIRATORY SYSTEM: Chronic | ICD-10-CM

## 2024-06-24 DIAGNOSIS — Z95.2 PRESENCE OF PROSTHETIC HEART VALVE: Chronic | ICD-10-CM

## 2024-06-24 DIAGNOSIS — Z98.890 OTHER SPECIFIED POSTPROCEDURAL STATES: Chronic | ICD-10-CM

## 2024-06-24 DIAGNOSIS — H05.352 EXOSTOSIS OF LEFT ORBIT: Chronic | ICD-10-CM

## 2024-06-24 DIAGNOSIS — I10 ESSENTIAL (PRIMARY) HYPERTENSION: ICD-10-CM

## 2024-06-24 PROCEDURE — G0463: CPT

## 2024-06-24 PROCEDURE — G2211 COMPLEX E/M VISIT ADD ON: CPT

## 2024-06-24 PROCEDURE — 99214 OFFICE O/P EST MOD 30 MIN: CPT

## 2024-06-24 RX ORDER — LEVETIRACETAM 500 MG/1
500 TABLET, FILM COATED, EXTENDED RELEASE ORAL
Qty: 360 | Refills: 1 | Status: ACTIVE | COMMUNITY
Start: 2024-06-24 | End: 1900-01-01

## 2024-06-25 ENCOUNTER — APPOINTMENT (OUTPATIENT)
Dept: INFECTIOUS DISEASE | Facility: CLINIC | Age: 74
End: 2024-06-25

## 2024-06-25 PROBLEM — Z01.818 PREPROCEDURAL EXAMINATION: Status: ACTIVE | Noted: 2020-06-24

## 2024-06-25 NOTE — PROGRESS NOTE ADULT - NUTRITIONAL ASSESSMENT
24  Feli Macias : Diet, Consistent Carbohydrate/No Snacks:   DASH/TLC {Sodium & Cholesterol Restricted} (DASH)  Tube Feeding Modality: Nasogastric  Glucerna 1.5 Chago (GLUCERNA1.5RTH)  Total Volume for 24 Hours (mL): 960  Continuous  Starting Tube Feed Rate {mL per Hour}: 40  Until Goal Tube Feed Rate (mL per Hour): 40  Tube Feed Duration (in Hours): 24  Tube Feed Start Time: 13:00  Supplement Feeding Modality:  Oral  Glucerna Shake Cans or Servings Per Day:  3       Frequency:  Daily (09-21-22 @ 13:47) [Active]          Please see RD assessment and/or follow up.  Managed by primary team as well

## 2024-06-28 LAB
INFLUENZA A RESULT: NOT DETECTED
INFLUENZA B RESULT: NOT DETECTED
RESP SYN VIRUS RESULT: NOT DETECTED
SARS-COV-2 RESULT: NOT DETECTED

## 2024-06-28 NOTE — H&P ADULT - NSHPSOURCEINFORD_GEN_ALL_CORE
Chief complaint:   Chief Complaint   Patient presents with    Sore Throat     2       Vitals:  Visit Vitals  /80   Pulse 70   Temp 98.4 °F (36.9 °C)   Resp 16   Wt 64 kg (141 lb 1.5 oz)   LMP 06/03/2024 (Exact Date)   SpO2 99%   BMI 21.45 kg/m²       HISTORY OF PRESENT ILLNESS     54-year-old female complain of sore throat for 2 and half days.  Is concerned about strep pharyngitis has had 5 strep cases since February.  Did see ENT and has a tonsillectomy scheduled in several months.  Denies fevers or chills states sore throat with feeling slightly improved this morning.  No difficulty swallowing.        Other significant problems:  Patient Active Problem List    Diagnosis Date Noted    Amenorrhea 12/04/2017     Priority: Low    Gluten-sensitive enteropathy 06/20/2013     Priority: Low     Formatting of this note might be different from the original.   Not tested but symptomatic with Gluten diet      Acquired hypothyroidism 06/05/2012     Priority: Low       PAST MEDICAL, FAMILY AND SOCIAL HISTORY     Medications:  Current Outpatient Medications   Medication Sig Dispense Refill    levothyroxine 112 MCG tablet TAKE 1 TABLET(112 MCG) BY MOUTH EVERY MORNING BEFORE BREAKFAST      MAGIC (antacid-diphenhydramine-lidocaine) 1:1:1 MOUTHWASH oral susp Swish and spit 15 mLs 4 times daily (before meals and nightly). 300 mL 0    estradiol (VIVELLE-DOT) 0.1 MG/24HR twice weekly patch APPLY 1 PATCH TOPICALLY TO THE SKIN 2 TIMES WEEKLY      sertraline (ZOLOFT) 100 MG tablet Take 100 mg by mouth daily.       No current facility-administered medications for this visit.       Allergies:  ALLERGIES:   Allergen Reactions    Sulfamethoxazole W/Trimethoprim RASH     Diffuse urticarial rash       Past Medical  History/Surgeries:  Past Medical History:   Diagnosis Date    Hypothyroidism     Negative History of CA, HTN, DM, CAD, CVA, DVT, Asthma        Past Surgical History:   Procedure Laterality Date    Past surgical history       Patient/Chart(s) None       Family History:  Family History   Problem Relation Age of Onset    Diabetes Maternal Grandmother     Diabetes Maternal Grandfather     Diabetes Mother     NEGATIVE FAMILY HX OF Father        Social History:  Social History     Tobacco Use    Smoking status: Never     Passive exposure: Never    Smokeless tobacco: Never   Substance Use Topics    Alcohol use: Not on file       REVIEW OF SYSTEMS     Review of Systems   All other systems reviewed and are negative.      PHYSICAL EXAM     Physical Exam  Vitals and nursing note reviewed.   Constitutional:       General: She is not in acute distress.     Appearance: She is not toxic-appearing.   HENT:      Mouth/Throat:      Mouth: Mucous membranes are moist.      Pharynx: Posterior oropharyngeal erythema present. No oropharyngeal exudate.   Skin:     Capillary Refill: Capillary refill takes less than 2 seconds.   Neurological:      General: No focal deficit present.      Mental Status: She is alert and oriented to person, place, and time.   Psychiatric:         Mood and Affect: Mood normal.         ASSESSMENT/PLAN     1. Sore throat    - POCT Streptococcus Group A PCR    2. Strep pharyngitis  Amox sent to pharmacy  Advised throw out toothbrush day 3     Results for orders placed or performed in visit on 06/28/24   POCT Streptococcus Group A PCR   Result Value Ref Range    STREPTOCOCCUS GROUP A PCR Detected (A) Not Detected    TEST LOT NUMBER N/A     TEST LOT EXPIRATION DATE N/A             MAGY Otero     In the interest of transparency, the 21st Century Cures Act requires healthcare organizations to make nearly all medical information readily available to patients. Please remember that this document is intended as a form of “[id number]” communication. Often medical records contain verbiage and abbreviations that might be unfamiliar and without context. Language may appear direct as it is intended to succinctly relay the clinical opinion of the  practitioner.

## 2024-07-02 ENCOUNTER — INPATIENT (INPATIENT)
Facility: HOSPITAL | Age: 74
LOS: 5 days | Discharge: HOME | DRG: 301 | End: 2024-07-08
Attending: STUDENT IN AN ORGANIZED HEALTH CARE EDUCATION/TRAINING PROGRAM | Admitting: STUDENT IN AN ORGANIZED HEALTH CARE EDUCATION/TRAINING PROGRAM
Payer: MEDICARE

## 2024-07-02 ENCOUNTER — NON-APPOINTMENT (OUTPATIENT)
Age: 74
End: 2024-07-02

## 2024-07-02 VITALS — DIASTOLIC BLOOD PRESSURE: 84 MMHG | SYSTOLIC BLOOD PRESSURE: 124 MMHG | HEIGHT: 67 IN | HEART RATE: 55 BPM

## 2024-07-02 DIAGNOSIS — K62.5 HEMORRHAGE OF ANUS AND RECTUM: Chronic | ICD-10-CM

## 2024-07-02 DIAGNOSIS — Z95.2 PRESENCE OF PROSTHETIC HEART VALVE: Chronic | ICD-10-CM

## 2024-07-02 DIAGNOSIS — Z98.89 UNDEFINED: Chronic | ICD-10-CM

## 2024-07-02 DIAGNOSIS — Z96.653 PRESENCE OF ARTIFICIAL KNEE JOINT, BILATERAL: Chronic | ICD-10-CM

## 2024-07-02 DIAGNOSIS — H05.352 EXOSTOSIS OF LEFT ORBIT: Chronic | ICD-10-CM

## 2024-07-02 DIAGNOSIS — Z98.890 OTHER SPECIFIED POSTPROCEDURAL STATES: Chronic | ICD-10-CM

## 2024-07-02 DIAGNOSIS — Z01.818 ENCOUNTER FOR OTHER PREPROCEDURAL EXAMINATION: ICD-10-CM

## 2024-07-02 DIAGNOSIS — Z87.09 PERSONAL HISTORY OF OTHER DISEASES OF THE RESPIRATORY SYSTEM: Chronic | ICD-10-CM

## 2024-07-02 LAB
GAS PNL BLDV: SIGNIFICANT CHANGE UP
GAS PNL BLDV: SIGNIFICANT CHANGE UP
HCT VFR BLD CALC: 38.8 % — SIGNIFICANT CHANGE UP (ref 34.5–45)
HGB BLD-MCNC: 11.8 G/DL — SIGNIFICANT CHANGE UP (ref 11.5–15.5)
MCHC RBC-ENTMCNC: 20.9 PG — LOW (ref 27–34)
MCHC RBC-ENTMCNC: 30.4 GM/DL — LOW (ref 32–36)
MCV RBC AUTO: 68.8 FL — LOW (ref 80–100)
RBC # BLD: 5.64 M/UL — HIGH (ref 3.8–5.2)
RBC # FLD: 23.3 % — HIGH (ref 10.3–14.5)
WBC # BLD: 9.78 K/UL — SIGNIFICANT CHANGE UP (ref 3.8–10.5)
WBC # FLD AUTO: 9.78 K/UL — SIGNIFICANT CHANGE UP (ref 3.8–10.5)

## 2024-07-02 PROCEDURE — 99285 EMERGENCY DEPT VISIT HI MDM: CPT | Mod: GC

## 2024-07-02 RX ORDER — LABETALOL HYDROCHLORIDE 300 MG/1
0.5 TABLET ORAL
Qty: 200 | Refills: 0 | Status: DISCONTINUED | OUTPATIENT
Start: 2024-07-02 | End: 2024-07-03

## 2024-07-02 NOTE — ED PROVIDER NOTE - PHYSICAL EXAMINATION
Vital Signs Stable  Gen: well appearing, NAD  HEENT: no conjunctivitis  Cardiac: regular rate rhythm, normal S1S2, 1+ radial pulse b/l  Chest: CTA BL  Abdomen: normal BS, soft, NT  Extremity: no gross deformity, good perfusion, no edema b/l  Skin: no rash  Neuro: grossly normal, awake and alert, answering questions appropriately Vital Signs Stable  Gen: well appearing, NAD  HEENT: no conjunctivitis  Cardiac: regular rate rhythm, normal S1S2, 1+ radial and DP pulses b/l  Chest: rhonchorous b/l  Abdomen: normal BS, soft, NT  Extremity: no gross deformity, good perfusion, no edema b/l  Skin: no rash  Neuro: grossly normal, awake and alert, answering questions appropriately

## 2024-07-02 NOTE — ED PROVIDER NOTE - ATTENDING CONTRIBUTION TO CARE
Kristian Ro DO: I have personally performed a face to face medical and diagnostic evaluation of the patient. I have discussed with and reviewed the Resident's and/or ACP's and/or Medical/PA/NP student's note and agree with the History, ROS, Physical Exam and MDM unless otherwise indicated. A brief summary of my personal evaluation and impression can be found below.     73-year-old female with PMH A-fib, COPD, diabetes, TAVR, MGUS, tracheomalacia, AAA rupture 1 year ago, bronchiectasis, aortic dissection, colon cancer status post chemo/radiation and ostomy, adrenal insufficiency presents as a transfer from [facility] for type B aortic dissection.  Patient originally presented to [facility] for crushing chest pain described as a heaviness in her chest.  Patient endorses shortness of breath at this time and is not on oxygen at home.  Patient received a CT angio of the chest abdomen pelvis at the outside hospital which showed a type B aortic dissection with great vessel involvement.  All great vessels arise from the true lumen.  There was no sign of false lumen thrombosis or true lumen compression.  Previous aortic valve replacement and aortic root replacement seen.  Mild right middle lobe and right lower lobe bronchiolitis.  1.8 cm pancreatic tail cyst.  Previous AP resection of the rectum and sigmoid colon.  Patient denies any chest pain at this time.  States she had a couple episodes of vomiting yesterday.  Patient was also found to be COVID-positive incidentally.  EMS vitals on arrival were 124/74.  Patient is on a labetalol drip and 0.5.  Pulse is in the 50s.    CONSTITUTIONAL: well-appearing, in NAD  SKIN: Warm dry, normal skin turgor  HEAD: NCAT  EYES: EOMI, PERRLA, no scleral icterus, conjunctiva pink  ENT: normal pharynx with no erythema or exudates  NECK: Supple; non tender. Full ROM.  CARD: Heart rate 50s  RESP: clear to ausculation b/l. No crackles or wheezing.  ABD: soft, non-tender, non-distended, no rebound or guarding, No pulsatile masses, no epigastric tenderness,Ostomy  MSK: Full ROM, no bony tenderness, no pedal edema, no calf tendernessBilateral lower extremity pulses 4 out of 4 and equal.  NEURO: normal motor. normal sensory. CN II-XII intact. Cerebellar testing normal. Normal gait.  PSYCH: Cooperative, appropriate.     Type B aortic Dissection currently with manage heart rate,Will give labetalol with blood pressure control parameters, vascular surgery consultation, patient is currently feeling well and not having any ongoing chest pain.  Will send off troponin repeat EKG, if any ongoing concern for ACS exist consider additional labs versus cardiology consultation, dispo pending vascular surgery recommendations, will await recommendations from vascular surgery for additional imaging at this time.

## 2024-07-02 NOTE — ED PROVIDER NOTE - OBJECTIVE STATEMENT
73-year-old female with PMH A-fib, COPD, diabetes, TAVR, tracheomalacia, AAA rupture 1 year ago, bronchiectasis, aortic dissection, colon cancer status post ostomy, adrenal insufficiency presents as a transfer from [facility] for type B aortic dissection.  Patient originally presented to [facility] for crushing chest pain described as a heaviness in her chest.  Patient endorses shortness of breath at this time and is not on oxygen at home.  Patient received a CT angio of the chest abdomen pelvis at the outside hospital which showed a type B aortic dissection with great vessel involvement.  All great vessels arise from the true lumen.  There was no sign of false lumen thrombosis or true lumen compression.  Previous aortic valve replacement and aortic root replacement seen.  Mild right middle lobe and right lower lobe bronchiolitis.  1.8 cm pancreatic tail cyst.  Previous AP resection of the rectum and sigmoid colon.  Patient denies any chest pain at this time.  States she had a couple episodes of vomiting yesterday.  Patient was also found to be COVID-positive incidentally.  EMS vitals on arrival were 124/74.  Patient is on a labetalol drip and 0.5.  Pulse is in the 50s. 73-year-old female with PMH A-fib, COPD, diabetes, TAVR, MGUS, tracheomalacia, AAA rupture 1 year ago, bronchiectasis, aortic dissection, colon cancer status post ostomy, adrenal insufficiency presents as a transfer from [facility] for type B aortic dissection.  Patient originally presented to [facility] for crushing chest pain described as a heaviness in her chest.  Patient endorses shortness of breath at this time and is not on oxygen at home.  Patient received a CT angio of the chest abdomen pelvis at the outside hospital which showed a type B aortic dissection with great vessel involvement.  All great vessels arise from the true lumen.  There was no sign of false lumen thrombosis or true lumen compression.  Previous aortic valve replacement and aortic root replacement seen.  Mild right middle lobe and right lower lobe bronchiolitis.  1.8 cm pancreatic tail cyst.  Previous AP resection of the rectum and sigmoid colon.  Patient denies any chest pain at this time.  States she had a couple episodes of vomiting yesterday.  Patient was also found to be COVID-positive incidentally.  EMS vitals on arrival were 124/74.  Patient is on a labetalol drip and 0.5.  Pulse is in the 50s. 73-year-old female with PMH A-fib, COPD, diabetes, TAVR, MGUS, tracheomalacia, AAA rupture 1 year ago, bronchiectasis, aortic dissection, colon cancer status post chemo/radiation and ostomy, adrenal insufficiency presents as a transfer from [facility] for type B aortic dissection.  Patient originally presented to [facility] for crushing chest pain described as a heaviness in her chest.  Patient endorses shortness of breath at this time and is not on oxygen at home.  Patient received a CT angio of the chest abdomen pelvis at the outside hospital which showed a type B aortic dissection with great vessel involvement.  All great vessels arise from the true lumen.  There was no sign of false lumen thrombosis or true lumen compression.  Previous aortic valve replacement and aortic root replacement seen.  Mild right middle lobe and right lower lobe bronchiolitis.  1.8 cm pancreatic tail cyst.  Previous AP resection of the rectum and sigmoid colon.  Patient denies any chest pain at this time.  States she had a couple episodes of vomiting yesterday.  Patient was also found to be COVID-positive incidentally.  EMS vitals on arrival were 124/74.  Patient is on a labetalol drip and 0.5.  Pulse is in the 50s.

## 2024-07-02 NOTE — ED PROVIDER NOTE - CLINICAL SUMMARY MEDICAL DECISION MAKING FREE TEXT BOX
73-year-old female with PMH A-fib, COPD, diabetes, TAVR, tracheomalacia, AAA rupture 1 year ago, bronchiectasis, aortic dissection, colon cancer status post ostomy, adrenal insufficiency presents as a transfer from [facility] for type B aortic dissection.  Patient originally presented to [facility] for crushing chest pain described as a heaviness in her chest.  Patient endorses shortness of breath at this time and is not on oxygen at home.  Patient received a CT angio of the chest abdomen pelvis at the outside hospital which showed a type B aortic dissection with great vessel involvement.  All great vessels arise from the true lumen.  There was no sign of false lumen thrombosis or true lumen compression.  Previous aortic valve replacement and aortic root replacement seen.  Mild right middle lobe and right lower lobe bronchiolitis.  1.8 cm pancreatic tail cyst.  Previous AP resection of the rectum and sigmoid colon.  Patient denies any chest pain at this time.  States she had a couple episodes of vomiting yesterday.  Patient was also found to be COVID-positive incidentally.  EMS vitals on arrival were 124/74.  Patient is on a labetalol drip and 0.5.  Pulse is in the 50s.  Exam shows normal S1, S2, 1+ pulses radial bilaterally, awake and alert answering questions appropriately, lungs CTAB. Will obtain labs, continue labetalol gtt, BP management goal systolic <120. consult vascular surgery and plan to admit. 73-year-old female with PMH A-fib, COPD, diabetes, TAVR, tracheomalacia, AAA rupture 1 year ago, bronchiectasis, aortic dissection, colon cancer status post ostomy, adrenal insufficiency presents as a transfer from [facility] for type B aortic dissection.  Patient originally presented to [facility] for crushing chest pain described as a heaviness in her chest.  Patient endorses shortness of breath at this time and is not on oxygen at home.  Patient received a CT angio of the chest abdomen pelvis at the outside hospital which showed a type B aortic dissection with great vessel involvement.  All great vessels arise from the true lumen.  There was no sign of false lumen thrombosis or true lumen compression.  Previous aortic valve replacement and aortic root replacement seen.  Mild right middle lobe and right lower lobe bronchiolitis.  1.8 cm pancreatic tail cyst.  Previous AP resection of the rectum and sigmoid colon.  Patient denies any chest pain at this time.  States she had a couple episodes of vomiting yesterday.  Patient was also found to be COVID-positive incidentally.  EMS vitals on arrival were 124/74.  Patient is on a labetalol drip and 0.5.  Pulse is in the 50s.  Exam shows normal S1, S2, 1+ pulses radial and DP bilaterally, awake and alert answering questions appropriately, lungs rhonchorous b/l. Will obtain labs, continue labetalol gtt, BP management goal systolic <120. consult vascular surgery and plan to admit.

## 2024-07-02 NOTE — ED PROVIDER NOTE - NSICDXPASTSURGICALHX_GEN_ALL_CORE_FT
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    H/O pelvic surgery 5 years ago - s/p fracture    H/O total knee replacement, bilateral 5 years ago    History of partial hysterectomy 30 years ago - fibroids    History of sinus surgery multiple sinus surgeries    History of tracheomalacia 2015 - attempted tracheal stenting ([facility])- course complicated by obstruction, respiratory failure, multiple CPR attempts -  stent discontinued; 10/20/2016 Tracheobronchoplasty (Prolene Mesh) performed at [facility] by Dr Zapien    Rectal bleeding exam under anesthesia ([facility]) 2/2018    S/P aortic valve replacement     S/P bronchoscopy 6/5/2018 - [facility] (Dr Zapien) no evidence of tracheobronchomalacia in trachea or bronchial tubes    S/P TAVR (transcatheter aortic valve replacement)

## 2024-07-02 NOTE — ED ADULT NURSE NOTE - NSFALLUNIVINTERV_ED_ALL_ED
Bed/Stretcher in lowest position, wheels locked, appropriate side rails in place/Call bell, personal items and telephone in reach/Instruct patient to call for assistance before getting out of bed/chair/stretcher/Non-slip footwear applied when patient is off stretcher/[place] to call system/Physically safe environment - no spills, clutter or unnecessary equipment/Purposeful proactive rounding/Room/bathroom lighting operational, light cord in reach

## 2024-07-02 NOTE — ED ADULT NURSE NOTE - OBJECTIVE STATEMENT
pt is a 74yo female PMH bronchiectasis, trachea malasia, asthma, aortic dissection 1 year ago BIBEMS as transfer from [facility] complaining of chest pain. pt reports going to [facility] ED today for 1 week of intermittent chest pain, describes "heaviness" to chest, pt dx with Type B aortic dissection and transferred to [facility] ED for cardiology. per EMS, pt 160 systolic at [facility], started on Labetalol drip at 5mg/min with goal SBP 120s, on arrival, labetalol drip going at .5mg/min titrated by EMS to meet SBP goals, pt 124/74 on arrival. pt also found to be COVID positive prior to transport. pt denies any chest pain or discomfort on arrival, MD Ro at bedside. pt A&Ox3 gross neuro intact, no difficulty speaking in complete sentences, pulses x 4, morales x4, abdomen soft nontender nondistended, skin intact. pt denies sob, ha, n/v/d, abdominal pain, f/c, urinary symptoms, hematuria pt is a 72yo female PMH bronchiectasis, trachea malasia, asthma, aortic dissection 1 year ago BIBEMS as transfer from [facility] complaining of chest pain. pt reports going to [facility] ED today for 1 week of intermittent chest pain, describes "heaviness" to chest, pt dx with Type B aortic dissection and transferred to [facility] ED for cardiology. per EMS, pt 160 systolic at [facility], started on Labetalol drip at 5mg/min with goal SBP 120s, on arrival, labetalol drip going at .5mg/min titrated by EMS to meet SBP goals, pt 124/74 on arrival. pt also found to be COVID positive prior to transport, per EMS pt hypoxic to 91%, placed on 2L with improvement to 97%, on arrival pt 97% on RA, no supplemental oxygen applied at this time. pt denies any chest pain or discomfort on arrival, MD Ro at bedside. pt A&Ox3 gross neuro intact, no difficulty speaking in complete sentences, pulses x 4, morales x4, abdomen soft nontender nondistended, skin intact. pt denies sob, ha, n/v/d, abdominal pain, f/c, urinary symptoms, hematuria

## 2024-07-02 NOTE — ED PROVIDER NOTE - PROGRESS NOTE DETAILS
Mike Samuels MD PGY-3: surgery (vascular) consulted, will see the patient Mike Samuels MD PGY-3: vascular at bedside Kristian Ro, DO:Per vascular surgery requesting switching from labetalol drip to esmolol drip, will premedicate in case patient requires repeat imaging, awaiting vascular input. Kristian Ro, DO:Per vascular surgery requesting switching from labetalol drip to esmolol drip, will premedicate in case patient requires repeat imaging, awaiting vascular input. also to premedicate patient for contrast allergy given she made need to obtain CT angio in house if unable to access imaging from OSH Kristian Ro DO:Patient was premedicated for possible repeat CT however patient's CAT scan was uploaded after being sent by , awaiting surgery recommendations. Kristian Ro DO:CT surgery consulted and is currently following case, awaiting final recommendations for disposition.  Patient's vital signs remained within acceptable parameters on esmolol drip. Followed up with CT surgery fellow, aware of patient awaiting for attending to look at films and will come down and see the patient.  Sajan Attending Gaudencio: received sign out pending CT surg recs. Pt accepted to CTU under Dr. Lozada.

## 2024-07-03 ENCOUNTER — NON-APPOINTMENT (OUTPATIENT)
Age: 74
End: 2024-07-03

## 2024-07-03 ENCOUNTER — APPOINTMENT (OUTPATIENT)
Dept: PEDIATRIC ALLERGY IMMUNOLOGY | Facility: CLINIC | Age: 74
End: 2024-07-03

## 2024-07-03 ENCOUNTER — RESULT REVIEW (OUTPATIENT)
Age: 74
End: 2024-07-03

## 2024-07-03 ENCOUNTER — APPOINTMENT (OUTPATIENT)
Dept: CARDIOLOGY | Facility: CLINIC | Age: 74
End: 2024-07-03

## 2024-07-03 DIAGNOSIS — Z86.79 PERSONAL HISTORY OF OTHER DISEASES OF THE CIRCULATORY SYSTEM: ICD-10-CM

## 2024-07-03 DIAGNOSIS — J45.909 UNSPECIFIED ASTHMA, UNCOMPLICATED: ICD-10-CM

## 2024-07-03 DIAGNOSIS — I71.012 DISSECTION OF DESCENDING THORACIC AORTA: ICD-10-CM

## 2024-07-03 DIAGNOSIS — Z87.898 PERSONAL HISTORY OF OTHER SPECIFIED CONDITIONS: ICD-10-CM

## 2024-07-03 DIAGNOSIS — R07.89 OTHER CHEST PAIN: ICD-10-CM

## 2024-07-03 DIAGNOSIS — U07.1 COVID-19: ICD-10-CM

## 2024-07-03 LAB
ALBUMIN SERPL ELPH-MCNC: 3.5 G/DL — SIGNIFICANT CHANGE UP (ref 3.3–5)
ALBUMIN SERPL ELPH-MCNC: 3.8 G/DL — SIGNIFICANT CHANGE UP (ref 3.3–5)
ALP SERPL-CCNC: 87 U/L — SIGNIFICANT CHANGE UP (ref 40–120)
ALP SERPL-CCNC: 94 U/L — SIGNIFICANT CHANGE UP (ref 40–120)
ALT FLD-CCNC: 13 U/L — SIGNIFICANT CHANGE UP (ref 10–45)
ALT FLD-CCNC: 18 U/L — SIGNIFICANT CHANGE UP (ref 10–45)
ANION GAP SERPL CALC-SCNC: 14 MMOL/L — SIGNIFICANT CHANGE UP (ref 5–17)
ANION GAP SERPL CALC-SCNC: 15 MMOL/L — SIGNIFICANT CHANGE UP (ref 5–17)
ANION GAP SERPL CALC-SCNC: 17 MMOL/L — SIGNIFICANT CHANGE UP (ref 5–17)
ANISOCYTOSIS BLD QL: SLIGHT — SIGNIFICANT CHANGE UP
APPEARANCE UR: CLEAR — SIGNIFICANT CHANGE UP
APTT BLD: 30.9 SEC — SIGNIFICANT CHANGE UP (ref 24.5–35.6)
APTT BLD: 33.2 SEC — SIGNIFICANT CHANGE UP (ref 24.5–35.6)
AST SERPL-CCNC: 13 U/L — SIGNIFICANT CHANGE UP (ref 10–40)
AST SERPL-CCNC: 30 U/L — SIGNIFICANT CHANGE UP (ref 10–40)
BASE EXCESS BLDV CALC-SCNC: 4.4 MMOL/L — HIGH (ref -2–3)
BASOPHILS # BLD AUTO: 0 K/UL — SIGNIFICANT CHANGE UP (ref 0–0.2)
BASOPHILS # BLD AUTO: 0.01 K/UL — SIGNIFICANT CHANGE UP (ref 0–0.2)
BASOPHILS NFR BLD AUTO: 0 % — SIGNIFICANT CHANGE UP (ref 0–2)
BASOPHILS NFR BLD AUTO: 0.1 % — SIGNIFICANT CHANGE UP (ref 0–2)
BILIRUB SERPL-MCNC: 0.2 MG/DL — SIGNIFICANT CHANGE UP (ref 0.2–1.2)
BILIRUB SERPL-MCNC: 0.2 MG/DL — SIGNIFICANT CHANGE UP (ref 0.2–1.2)
BILIRUB UR-MCNC: NEGATIVE — SIGNIFICANT CHANGE UP
BLD GP AB SCN SERPL QL: NEGATIVE — SIGNIFICANT CHANGE UP
BUN SERPL-MCNC: 20 MG/DL — SIGNIFICANT CHANGE UP (ref 7–23)
BUN SERPL-MCNC: 20 MG/DL — SIGNIFICANT CHANGE UP (ref 7–23)
BUN SERPL-MCNC: 22 MG/DL — SIGNIFICANT CHANGE UP (ref 7–23)
CA-I SERPL-SCNC: 1.26 MMOL/L — SIGNIFICANT CHANGE UP (ref 1.15–1.33)
CALCIUM SERPL-MCNC: 9.1 MG/DL — SIGNIFICANT CHANGE UP (ref 8.4–10.5)
CALCIUM SERPL-MCNC: 9.4 MG/DL — SIGNIFICANT CHANGE UP (ref 8.4–10.5)
CALCIUM SERPL-MCNC: 9.5 MG/DL — SIGNIFICANT CHANGE UP (ref 8.4–10.5)
CHLORIDE BLDV-SCNC: 104 MMOL/L — SIGNIFICANT CHANGE UP (ref 96–108)
CHLORIDE SERPL-SCNC: 101 MMOL/L — SIGNIFICANT CHANGE UP (ref 96–108)
CHLORIDE SERPL-SCNC: 102 MMOL/L — SIGNIFICANT CHANGE UP (ref 96–108)
CHLORIDE SERPL-SCNC: 103 MMOL/L — SIGNIFICANT CHANGE UP (ref 96–108)
CK MB CFR SERPL CALC: 3.7 NG/ML — SIGNIFICANT CHANGE UP (ref 0–3.8)
CK SERPL-CCNC: 76 U/L — SIGNIFICANT CHANGE UP (ref 25–170)
CO2 BLDV-SCNC: 31 MMOL/L — HIGH (ref 22–26)
CO2 SERPL-SCNC: 20 MMOL/L — LOW (ref 22–31)
CO2 SERPL-SCNC: 21 MMOL/L — LOW (ref 22–31)
CO2 SERPL-SCNC: 22 MMOL/L — SIGNIFICANT CHANGE UP (ref 22–31)
COLOR SPEC: YELLOW — SIGNIFICANT CHANGE UP
CREAT SERPL-MCNC: 0.72 MG/DL — SIGNIFICANT CHANGE UP (ref 0.5–1.3)
CREAT SERPL-MCNC: 0.74 MG/DL — SIGNIFICANT CHANGE UP (ref 0.5–1.3)
CREAT SERPL-MCNC: 0.78 MG/DL — SIGNIFICANT CHANGE UP (ref 0.5–1.3)
DIFF PNL FLD: NEGATIVE — SIGNIFICANT CHANGE UP
EGFR: 79 ML/MIN/1.73M2 — SIGNIFICANT CHANGE UP
EGFR: 84 ML/MIN/1.73M2 — SIGNIFICANT CHANGE UP
EGFR: 87 ML/MIN/1.73M2 — SIGNIFICANT CHANGE UP
ELLIPTOCYTES BLD QL SMEAR: SLIGHT — SIGNIFICANT CHANGE UP
EOSINOPHIL # BLD AUTO: 0 K/UL — SIGNIFICANT CHANGE UP (ref 0–0.5)
EOSINOPHIL # BLD AUTO: 0 K/UL — SIGNIFICANT CHANGE UP (ref 0–0.5)
EOSINOPHIL NFR BLD AUTO: 0 % — SIGNIFICANT CHANGE UP (ref 0–6)
EOSINOPHIL NFR BLD AUTO: 0 % — SIGNIFICANT CHANGE UP (ref 0–6)
FIBRINOGEN PPP-MCNC: 347 MG/DL — SIGNIFICANT CHANGE UP (ref 200–445)
GAS PNL BLDA: SIGNIFICANT CHANGE UP
GAS PNL BLDV: 136 MMOL/L — SIGNIFICANT CHANGE UP (ref 136–145)
GAS PNL BLDV: SIGNIFICANT CHANGE UP
GLUCOSE BLDV-MCNC: 402 MG/DL — HIGH (ref 70–99)
GLUCOSE SERPL-MCNC: 251 MG/DL — HIGH (ref 70–99)
GLUCOSE SERPL-MCNC: 352 MG/DL — HIGH (ref 70–99)
GLUCOSE SERPL-MCNC: 355 MG/DL — HIGH (ref 70–99)
GLUCOSE UR QL: >=1000 MG/DL
HCO3 BLDV-SCNC: 30 MMOL/L — HIGH (ref 22–29)
HCT VFR BLD CALC: 37.9 % — SIGNIFICANT CHANGE UP (ref 34.5–45)
HCT VFR BLDA CALC: 36 % — SIGNIFICANT CHANGE UP (ref 34.5–46.5)
HGB BLD CALC-MCNC: 12.1 G/DL — SIGNIFICANT CHANGE UP (ref 11.7–16.1)
HGB BLD-MCNC: 11.1 G/DL — LOW (ref 11.5–15.5)
HYPOCHROMIA BLD QL: SLIGHT — SIGNIFICANT CHANGE UP
IMM GRANULOCYTES NFR BLD AUTO: 1.1 % — HIGH (ref 0–0.9)
INR BLD: 1.04 RATIO — SIGNIFICANT CHANGE UP (ref 0.85–1.18)
INR BLD: 1.18 RATIO — SIGNIFICANT CHANGE UP (ref 0.85–1.18)
KETONES UR-MCNC: NEGATIVE MG/DL — SIGNIFICANT CHANGE UP
LACTATE BLDV-MCNC: 1.7 MMOL/L — SIGNIFICANT CHANGE UP (ref 0.5–2)
LACTATE SERPL-SCNC: 1.7 MMOL/L — SIGNIFICANT CHANGE UP (ref 0.5–2)
LEUKOCYTE ESTERASE UR-ACNC: NEGATIVE — SIGNIFICANT CHANGE UP
LIDOCAIN IGE QN: 26 U/L — SIGNIFICANT CHANGE UP (ref 7–60)
LYMPHOCYTES # BLD AUTO: 0.18 K/UL — LOW (ref 1–3.3)
LYMPHOCYTES # BLD AUTO: 0.9 K/UL — LOW (ref 1–3.3)
LYMPHOCYTES # BLD AUTO: 1.8 % — LOW (ref 13–44)
LYMPHOCYTES # BLD AUTO: 8.5 % — LOW (ref 13–44)
MAGNESIUM SERPL-MCNC: 2.2 MG/DL — SIGNIFICANT CHANGE UP (ref 1.6–2.6)
MAGNESIUM SERPL-MCNC: 2.3 MG/DL — SIGNIFICANT CHANGE UP (ref 1.6–2.6)
MANUAL SMEAR VERIFICATION: SIGNIFICANT CHANGE UP
MCHC RBC-ENTMCNC: 20.3 PG — LOW (ref 27–34)
MCHC RBC-ENTMCNC: 29.3 GM/DL — LOW (ref 32–36)
MCV RBC AUTO: 69.2 FL — LOW (ref 80–100)
MICROCYTES BLD QL: SLIGHT — SIGNIFICANT CHANGE UP
MONOCYTES # BLD AUTO: 0.09 K/UL — SIGNIFICANT CHANGE UP (ref 0–0.9)
MONOCYTES # BLD AUTO: 0.35 K/UL — SIGNIFICANT CHANGE UP (ref 0–0.9)
MONOCYTES NFR BLD AUTO: 0.9 % — LOW (ref 2–14)
MONOCYTES NFR BLD AUTO: 3.3 % — SIGNIFICANT CHANGE UP (ref 2–14)
MRSA PCR RESULT.: DETECTED
NEUTROPHILS # BLD AUTO: 9.24 K/UL — HIGH (ref 1.8–7.4)
NEUTROPHILS # BLD AUTO: 9.52 K/UL — HIGH (ref 1.8–7.4)
NEUTROPHILS NFR BLD AUTO: 87 % — HIGH (ref 43–77)
NEUTROPHILS NFR BLD AUTO: 97.3 % — HIGH (ref 43–77)
NITRITE UR-MCNC: NEGATIVE — SIGNIFICANT CHANGE UP
NRBC # BLD: 0 /100 WBCS — SIGNIFICANT CHANGE UP (ref 0–0)
NT-PROBNP SERPL-SCNC: 137 PG/ML — SIGNIFICANT CHANGE UP (ref 0–300)
NT-PROBNP SERPL-SCNC: 145 PG/ML — SIGNIFICANT CHANGE UP (ref 0–300)
OVALOCYTES BLD QL SMEAR: SLIGHT — SIGNIFICANT CHANGE UP
PCO2 BLDV: 47 MMHG — HIGH (ref 39–42)
PH BLDV: 7.41 — SIGNIFICANT CHANGE UP (ref 7.32–7.43)
PH UR: 5.5 — SIGNIFICANT CHANGE UP (ref 5–8)
PHOSPHATE SERPL-MCNC: 2.9 MG/DL — SIGNIFICANT CHANGE UP (ref 2.5–4.5)
PLAT MORPH BLD: NORMAL — SIGNIFICANT CHANGE UP
PLATELET # BLD AUTO: 213 K/UL — SIGNIFICANT CHANGE UP (ref 150–400)
PLATELET # BLD AUTO: 217 K/UL — SIGNIFICANT CHANGE UP (ref 150–400)
PO2 BLDV: 66 MMHG — HIGH (ref 25–45)
POIKILOCYTOSIS BLD QL AUTO: SIGNIFICANT CHANGE UP
POTASSIUM BLDV-SCNC: 5.5 MMOL/L — HIGH (ref 3.5–5.1)
POTASSIUM SERPL-MCNC: 4.7 MMOL/L — SIGNIFICANT CHANGE UP (ref 3.5–5.3)
POTASSIUM SERPL-MCNC: 5.2 MMOL/L — SIGNIFICANT CHANGE UP (ref 3.5–5.3)
POTASSIUM SERPL-MCNC: 5.4 MMOL/L — HIGH (ref 3.5–5.3)
POTASSIUM SERPL-MCNC: 5.9 MMOL/L — HIGH (ref 3.5–5.3)
POTASSIUM SERPL-SCNC: 4.7 MMOL/L — SIGNIFICANT CHANGE UP (ref 3.5–5.3)
POTASSIUM SERPL-SCNC: 5.2 MMOL/L — SIGNIFICANT CHANGE UP (ref 3.5–5.3)
POTASSIUM SERPL-SCNC: 5.4 MMOL/L — HIGH (ref 3.5–5.3)
POTASSIUM SERPL-SCNC: 5.9 MMOL/L — HIGH (ref 3.5–5.3)
PROT SERPL-MCNC: 6.9 G/DL — SIGNIFICANT CHANGE UP (ref 6–8.3)
PROT SERPL-MCNC: 7.1 G/DL — SIGNIFICANT CHANGE UP (ref 6–8.3)
PROT UR-MCNC: SIGNIFICANT CHANGE UP MG/DL
PROTHROM AB SERPL-ACNC: 11.4 SEC — SIGNIFICANT CHANGE UP (ref 9.5–13)
PROTHROM AB SERPL-ACNC: 12.3 SEC — SIGNIFICANT CHANGE UP (ref 9.5–13)
RBC # BLD: 5.48 M/UL — HIGH (ref 3.8–5.2)
RBC # FLD: 22.8 % — HIGH (ref 10.3–14.5)
RBC BLD AUTO: ABNORMAL
RH IG SCN BLD-IMP: POSITIVE — SIGNIFICANT CHANGE UP
S AUREUS DNA NOSE QL NAA+PROBE: DETECTED
SAO2 % BLDV: 91.5 % — HIGH (ref 67–88)
SCHISTOCYTES BLD QL AUTO: SLIGHT — SIGNIFICANT CHANGE UP
SMUDGE CELLS # BLD: PRESENT — SIGNIFICANT CHANGE UP
SODIUM SERPL-SCNC: 138 MMOL/L — SIGNIFICANT CHANGE UP (ref 135–145)
SODIUM SERPL-SCNC: 138 MMOL/L — SIGNIFICANT CHANGE UP (ref 135–145)
SODIUM SERPL-SCNC: 139 MMOL/L — SIGNIFICANT CHANGE UP (ref 135–145)
SP GR SPEC: >1.03 — HIGH (ref 1–1.03)
T4 FREE SERPL-MCNC: 1.2 NG/DL — SIGNIFICANT CHANGE UP (ref 0.9–1.8)
TROPONIN T, HIGH SENSITIVITY RESULT: 22 NG/L — SIGNIFICANT CHANGE UP (ref 0–51)
TROPONIN T, HIGH SENSITIVITY RESULT: 22 NG/L — SIGNIFICANT CHANGE UP (ref 0–51)
TROPONIN T, HIGH SENSITIVITY RESULT: 28 NG/L — SIGNIFICANT CHANGE UP (ref 0–51)
TSH SERPL-MCNC: 0.3 UIU/ML — SIGNIFICANT CHANGE UP (ref 0.27–4.2)
UROBILINOGEN FLD QL: 0.2 MG/DL — SIGNIFICANT CHANGE UP (ref 0.2–1)
WBC # BLD: 10.62 K/UL — HIGH (ref 3.8–10.5)
WBC # FLD AUTO: 10.62 K/UL — HIGH (ref 3.8–10.5)

## 2024-07-03 PROCEDURE — 99221 1ST HOSP IP/OBS SF/LOW 40: CPT

## 2024-07-03 PROCEDURE — 99291 CRITICAL CARE FIRST HOUR: CPT | Mod: 25

## 2024-07-03 PROCEDURE — 71045 X-RAY EXAM CHEST 1 VIEW: CPT | Mod: 26

## 2024-07-03 PROCEDURE — 36620 INSERTION CATHETER ARTERY: CPT | Mod: LT

## 2024-07-03 PROCEDURE — 99222 1ST HOSP IP/OBS MODERATE 55: CPT | Mod: 25

## 2024-07-03 PROCEDURE — 93306 TTE W/DOPPLER COMPLETE: CPT | Mod: 26

## 2024-07-03 RX ORDER — GABAPENTIN
100 POWDER (GRAM) MISCELLANEOUS EVERY 8 HOURS
Refills: 0 | Status: DISCONTINUED | OUTPATIENT
Start: 2024-07-03 | End: 2024-07-08

## 2024-07-03 RX ORDER — LEVETIRACETAM 100 MG/ML
1000 INJECTION INTRAVENOUS
Refills: 0 | Status: DISCONTINUED | OUTPATIENT
Start: 2024-07-03 | End: 2024-07-03

## 2024-07-03 RX ORDER — MUPIROCIN 20 MG/G
1 OINTMENT TOPICAL EVERY 12 HOURS
Refills: 0 | Status: DISCONTINUED | OUTPATIENT
Start: 2024-07-03 | End: 2024-07-08

## 2024-07-03 RX ORDER — LEVETIRACETAM 100 MG/ML
1000 INJECTION INTRAVENOUS ONCE
Refills: 0 | Status: COMPLETED | OUTPATIENT
Start: 2024-07-03 | End: 2024-07-03

## 2024-07-03 RX ORDER — BUDESONIDE 0.25 MG/2ML
0.5 INHALANT ORAL EVERY 12 HOURS
Refills: 0 | Status: DISCONTINUED | OUTPATIENT
Start: 2024-07-03 | End: 2024-07-03

## 2024-07-03 RX ORDER — TIOTROPIUM BROMIDE 18 UG/1
2 CAPSULE ORAL; RESPIRATORY (INHALATION) DAILY
Refills: 0 | Status: DISCONTINUED | OUTPATIENT
Start: 2024-07-03 | End: 2024-07-03

## 2024-07-03 RX ORDER — LEVETIRACETAM 100 MG/ML
1000 INJECTION INTRAVENOUS
Refills: 0 | Status: DISCONTINUED | OUTPATIENT
Start: 2024-07-03 | End: 2024-07-08

## 2024-07-03 RX ORDER — MONTELUKAST SODIUM 10 MG/1
10 TABLET, FILM COATED ORAL DAILY
Refills: 0 | Status: DISCONTINUED | OUTPATIENT
Start: 2024-07-03 | End: 2024-07-08

## 2024-07-03 RX ORDER — SENNOSIDES 8.6 MG
2 TABLET ORAL AT BEDTIME
Refills: 0 | Status: DISCONTINUED | OUTPATIENT
Start: 2024-07-03 | End: 2024-07-08

## 2024-07-03 RX ORDER — IPRATROPIUM BROMIDE AND ALBUTEROL SULFATE .5; 3 MG/3ML; MG/3ML
3 SOLUTION RESPIRATORY (INHALATION) EVERY 6 HOURS
Refills: 0 | Status: DISCONTINUED | OUTPATIENT
Start: 2024-07-03 | End: 2024-07-08

## 2024-07-03 RX ORDER — METHYLPREDNISOLONE ACETATE 20 MG/ML
125 VIAL (ML) INJECTION ONCE
Refills: 0 | Status: COMPLETED | OUTPATIENT
Start: 2024-07-03 | End: 2024-07-03

## 2024-07-03 RX ORDER — DIPHENHYDRAMINE HCL 12.5MG/5ML
50 ELIXIR ORAL ONCE
Refills: 0 | Status: COMPLETED | OUTPATIENT
Start: 2024-07-03 | End: 2024-07-03

## 2024-07-03 RX ORDER — PANTOPRAZOLE SODIUM 40 MG/10ML
40 INJECTION, POWDER, FOR SOLUTION INTRAVENOUS
Refills: 0 | Status: DISCONTINUED | OUTPATIENT
Start: 2024-07-03 | End: 2024-07-08

## 2024-07-03 RX ORDER — BUDESONIDE/FORMOTEROL FUMARATE 160-4.5MCG
2 HFA AEROSOL WITH ADAPTER (GRAM) INHALATION DAILY
Refills: 0 | Status: DISCONTINUED | OUTPATIENT
Start: 2024-07-03 | End: 2024-07-08

## 2024-07-03 RX ORDER — LABETALOL HYDROCHLORIDE 300 MG/1
0.5 TABLET ORAL
Qty: 200 | Refills: 0 | Status: DISCONTINUED | OUTPATIENT
Start: 2024-07-03 | End: 2024-07-03

## 2024-07-03 RX ORDER — ATORVASTATIN CALCIUM 20 MG/1
20 TABLET, FILM COATED ORAL AT BEDTIME
Refills: 0 | Status: DISCONTINUED | OUTPATIENT
Start: 2024-07-03 | End: 2024-07-08

## 2024-07-03 RX ORDER — TIOTROPIUM BROMIDE 18 UG/1
2 CAPSULE ORAL; RESPIRATORY (INHALATION) DAILY
Refills: 0 | Status: DISCONTINUED | OUTPATIENT
Start: 2024-07-03 | End: 2024-07-08

## 2024-07-03 RX ORDER — METHYLPREDNISOLONE ACETATE 20 MG/ML
12 VIAL (ML) INJECTION DAILY
Refills: 0 | Status: DISCONTINUED | OUTPATIENT
Start: 2024-07-03 | End: 2024-07-04

## 2024-07-03 RX ORDER — ESMOLOL HYDROCHLORIDE 10 MG/ML
50 INJECTION, SOLUTION INTRAVENOUS
Qty: 2500 | Refills: 0 | Status: DISCONTINUED | OUTPATIENT
Start: 2024-07-03 | End: 2024-07-03

## 2024-07-03 RX ORDER — ACETAMINOPHEN 325 MG
1000 TABLET ORAL ONCE
Refills: 0 | Status: COMPLETED | OUTPATIENT
Start: 2024-07-03 | End: 2024-07-03

## 2024-07-03 RX ORDER — LEVETIRACETAM 100 MG/ML
750 INJECTION INTRAVENOUS ONCE
Refills: 0 | Status: DISCONTINUED | OUTPATIENT
Start: 2024-07-03 | End: 2024-07-03

## 2024-07-03 RX ORDER — POLYETHYLENE GLYCOL 3350 1 G/G
17 POWDER ORAL DAILY
Refills: 0 | Status: DISCONTINUED | OUTPATIENT
Start: 2024-07-03 | End: 2024-07-08

## 2024-07-03 RX ORDER — FAMOTIDINE 40 MG
20 TABLET ORAL DAILY
Refills: 0 | Status: DISCONTINUED | OUTPATIENT
Start: 2024-07-03 | End: 2024-07-03

## 2024-07-03 RX ORDER — BUDESONIDE/FORMOTEROL FUMARATE 160-4.5MCG
2 HFA AEROSOL WITH ADAPTER (GRAM) INHALATION
Refills: 0 | Status: DISCONTINUED | OUTPATIENT
Start: 2024-07-03 | End: 2024-07-03

## 2024-07-03 RX ORDER — MEXILETINE HYDROCHLORIDE 250 MG/1
200 CAPSULE ORAL EVERY 8 HOURS
Refills: 0 | Status: DISCONTINUED | OUTPATIENT
Start: 2024-07-03 | End: 2024-07-08

## 2024-07-03 RX ORDER — ESMOLOL HYDROCHLORIDE 10 MG/ML
30800 INJECTION, SOLUTION INTRAVENOUS ONCE
Refills: 0 | Status: COMPLETED | OUTPATIENT
Start: 2024-07-03 | End: 2024-07-03

## 2024-07-03 RX ORDER — SODIUM CHLORIDE 0.9 % (FLUSH) 0.9 %
3 SYRINGE (ML) INJECTION EVERY 8 HOURS
Refills: 0 | Status: DISCONTINUED | OUTPATIENT
Start: 2024-07-03 | End: 2024-07-08

## 2024-07-03 RX ORDER — HEPARIN SODIUM 50 [USP'U]/ML
5000 INJECTION, SOLUTION INTRAVENOUS EVERY 8 HOURS
Refills: 0 | Status: DISCONTINUED | OUTPATIENT
Start: 2024-07-03 | End: 2024-07-08

## 2024-07-03 RX ORDER — ALBUTEROL 90 MCG
2 AEROSOL REFILL (GRAM) INHALATION EVERY 6 HOURS
Refills: 0 | Status: DISCONTINUED | OUTPATIENT
Start: 2024-07-03 | End: 2024-07-03

## 2024-07-03 RX ORDER — INSULIN GLARGINE 100 [IU]/ML
30 INJECTION, SOLUTION SUBCUTANEOUS ONCE
Refills: 0 | Status: COMPLETED | OUTPATIENT
Start: 2024-07-03 | End: 2024-07-03

## 2024-07-03 RX ORDER — UMECLIDINIUM 62.5 UG/1
1 AEROSOL, POWDER ORAL
Refills: 0 | DISCHARGE

## 2024-07-03 RX ORDER — NICARDIPINE HYDROCHLORIDE 0.1 MG/ML
5 INJECTION, SOLUTION INTRAVENOUS
Qty: 40 | Refills: 0 | Status: DISCONTINUED | OUTPATIENT
Start: 2024-07-03 | End: 2024-07-04

## 2024-07-03 RX ADMIN — LEVETIRACETAM 1000 MILLIGRAM(S): 100 INJECTION INTRAVENOUS at 19:00

## 2024-07-03 RX ADMIN — Medication 100 MILLIGRAM(S): at 21:31

## 2024-07-03 RX ADMIN — ESMOLOL HYDROCHLORIDE 30800 MICROGRAM(S): 10 INJECTION, SOLUTION INTRAVENOUS at 02:04

## 2024-07-03 RX ADMIN — Medication 1 TABLET(S): at 21:31

## 2024-07-03 RX ADMIN — Medication 400 MILLIGRAM(S): at 10:01

## 2024-07-03 RX ADMIN — HEPARIN SODIUM 5000 UNIT(S): 50 INJECTION, SOLUTION INTRAVENOUS at 21:31

## 2024-07-03 RX ADMIN — Medication 3 MILLILITER(S): at 21:36

## 2024-07-03 RX ADMIN — Medication 12 MILLIGRAM(S): at 19:30

## 2024-07-03 RX ADMIN — LABETALOL HYDROCHLORIDE 30 MG/MIN: 300 TABLET ORAL at 00:29

## 2024-07-03 RX ADMIN — Medication 2 TABLET(S): at 21:31

## 2024-07-03 RX ADMIN — Medication 2 PUFF(S): at 17:58

## 2024-07-03 RX ADMIN — Medication 500 MILLIGRAM(S): at 21:31

## 2024-07-03 RX ADMIN — INSULIN GLARGINE 30 UNIT(S): 100 INJECTION, SOLUTION SUBCUTANEOUS at 07:28

## 2024-07-03 RX ADMIN — Medication 3 MILLILITER(S): at 14:44

## 2024-07-03 RX ADMIN — LABETALOL HYDROCHLORIDE 30 MG/MIN: 300 TABLET ORAL at 00:16

## 2024-07-03 RX ADMIN — MONTELUKAST SODIUM 10 MILLIGRAM(S): 10 TABLET, FILM COATED ORAL at 19:00

## 2024-07-03 RX ADMIN — MEXILETINE HYDROCHLORIDE 200 MILLIGRAM(S): 250 CAPSULE ORAL at 21:31

## 2024-07-03 RX ADMIN — LEVETIRACETAM 1000 MILLIGRAM(S): 100 INJECTION INTRAVENOUS at 10:32

## 2024-07-03 RX ADMIN — ESMOLOL HYDROCHLORIDE 18.5 MICROGRAM(S)/KG/MIN: 10 INJECTION, SOLUTION INTRAVENOUS at 02:05

## 2024-07-03 RX ADMIN — IPRATROPIUM BROMIDE AND ALBUTEROL SULFATE 3 MILLILITER(S): .5; 3 SOLUTION RESPIRATORY (INHALATION) at 23:04

## 2024-07-03 RX ADMIN — NICARDIPINE HYDROCHLORIDE 25 MG/HR: 0.1 INJECTION, SOLUTION INTRAVENOUS at 19:00

## 2024-07-03 RX ADMIN — ATORVASTATIN CALCIUM 20 MILLIGRAM(S): 20 TABLET, FILM COATED ORAL at 21:30

## 2024-07-03 RX ADMIN — Medication 125 MILLIGRAM(S): at 02:07

## 2024-07-03 RX ADMIN — Medication 1000 MILLIGRAM(S): at 10:15

## 2024-07-03 NOTE — H&P ADULT - PROBLEM/PLAN-5
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 In the evening on Friday 03/18/22, he had the onset of nausea/emesis and feeling generally unwell. The symptoms persisted all night an on the subsequent morning, Saturday 03/19/22 he developed tachycardia and diaphoresis.  He was admitted to [facility] on 03/19/22 and was diagnosed with a subacute MI and cardiogenic shock.  On 03/19/22 he had an echocardiogram which revealed moderately-to-severely reduced LV function, akinesis mid and apical anterior wall, mid anteroseptum, apical septum, and apex, and mild-to-moderate MR. He was transferred to [facility] and underwent invasive coronary artery angiography on 03/19/22 which revealed LM mild, LAD prox 100%, LCX distal 80%, OM1 100%, OM2 100%, RCA mid 95%, CO 3.9 l/min, CI 1.8 l/min/m2, IABP placed, inotropes initiated. He then went on to have CABG x 3 (LIMA->LAD, SVG->OM2, SVG->RPDA) on 03/23/22.  He was initiated on guideline directed medical therapy and discharged home on 03/30/22.  Of note, he has had a very stressful year to day with the passing of his mother and sister. Today, 06/07/22, he had an echocardiogram which revealed an EF of 50%, LV GLS -20.1%, and akinesis mid anteroseptum, apical septum, apical inferior wall, and apex.  He was taking spironolactone QOD and torsemide daily and we clarified the proper regimen for both.  At present, he feels great.  
DISPLAY PLAN FREE TEXT

## 2024-07-03 NOTE — H&P ADULT - CRITICAL CARE SERVICES PROVIDED
No chief complaint on file.    Subjective   Erika Hayes is a 2 y.o. female who presents to the clinic today with complaints of: sore throat for 3-4 days and bilateral ear pain since yesterday. Mother reports very large tonsils are normal for her.     Sore Throat   This is a new problem. The current episode started in the past 7 days. The problem occurs constantly. The problem has been gradually worsening. Associated symptoms include congestion, coughing, fatigue, a fever, headaches, nausea and a sore throat. Pertinent negatives include no abdominal pain, anorexia, arthralgias, change in bowel habit, chest pain, chills, diaphoresis, joint swelling, myalgias, neck pain, numbness, rash, swollen glands, urinary symptoms, vertigo, visual change, vomiting or weakness. Nothing aggravates the symptoms. She has tried acetaminophen and NSAIDs for the symptoms. The treatment provided mild relief.   Earache    There is pain in both ears. This is a new problem. The current episode started yesterday. The problem occurs constantly. The problem has been gradually worsening. The maximum temperature recorded prior to her arrival was 100.4 - 100.9 F. The pain is moderate. Associated symptoms include coughing, headaches, rhinorrhea and a sore throat. Pertinent negatives include no abdominal pain, diarrhea, ear discharge, hearing loss, neck pain, rash or vomiting. She has tried acetaminophen and NSAIDs for the symptoms. The treatment provided mild relief. There is no history of a chronic ear infection, hearing loss or a tympanostomy tube.         Current Outpatient Medications:   •  amoxicillin (AMOXIL) 250 MG/5ML suspension, Take 7.5 mL by mouth 2 (Two) Times a Day for 10 days., Disp: 150 mL, Rfl: 0  •  miconazole (MICOTIN) 2 % cream, Apply  topically to the appropriate area as directed 2 (Two) Times a Day. To ringworm lesions. Keep out of eyes, Disp: 56.7 g, Rfl: 1  •  ondansetron ODT (ZOFRAN ODT) 4 MG disintegrating tablet, Take  "0.5 tablets by mouth Every 8 (Eight) Hours As Needed for Nausea or Vomiting., Disp: 10 tablet, Rfl: 0    Allergies:  Patient has no known allergies.    Past Medical History:   Diagnosis Date   • Influenza      History reviewed. No pertinent surgical history.  No family history on file.  Social History     Tobacco Use   • Smoking status: Passive Smoke Exposure - Never Smoker   • Smokeless tobacco: Never Used   Substance Use Topics   • Alcohol use: Not on file   • Drug use: Not on file       Review of Systems  Review of Systems   Constitutional: Positive for crying, fatigue, fever and irritability. Negative for chills and diaphoresis.   HENT: Positive for congestion, ear pain, rhinorrhea and sore throat. Negative for ear discharge, hearing loss and sneezing.    Respiratory: Positive for cough.    Cardiovascular: Negative for chest pain.   Gastrointestinal: Positive for nausea. Negative for abdominal pain, anorexia, change in bowel habit, diarrhea and vomiting.   Musculoskeletal: Negative for arthralgias, joint swelling, myalgias and neck pain.   Skin: Negative for rash.   Neurological: Positive for headaches. Negative for vertigo, weakness and numbness.   Hematological: Negative for adenopathy.       Objective   Pulse 103   Temp 98.1 °F (36.7 °C) (Tympanic)   Resp 22   Ht 99.1 cm (39\")   Wt 14.8 kg (32 lb 9.6 oz)   SpO2 99%   BMI 15.07 kg/m²       Physical Exam   Constitutional: She appears well-developed and well-nourished. She is active and uncooperative. She is crying.   HENT:   Head: Normocephalic and atraumatic.   Right Ear: External ear, pinna and canal normal. Tympanic membrane is injected and bulging.   Left Ear: External ear, pinna and canal normal. Tympanic membrane is injected and bulging.   Nose: Rhinorrhea, nasal discharge (thin clear. ) and congestion present.   Mouth/Throat: Mucous membranes are moist. Dentition is normal. Pharynx erythema present. No oropharyngeal exudate or pharynx petechiae. " Tonsils are 3+ on the right. Tonsils are 3+ on the left. Tonsillar exudate.   Neck: Normal range of motion. Neck supple. No neck rigidity.   Cardiovascular: Normal rate, regular rhythm, S1 normal and S2 normal.   Pulmonary/Chest: Effort normal and breath sounds normal. No nasal flaring or stridor. No respiratory distress. She has no wheezes. She has no rhonchi. She has no rales. She exhibits no retraction.   Lymphadenopathy: No occipital adenopathy is present.     She has cervical adenopathy.   Neurological: She is alert.   Skin: Skin is warm and dry.   Vitals reviewed.      Assessment/Plan     Diagnoses and all orders for this visit:    Acute tonsillitis, unspecified etiology  -     POCT rapid strep A    Acute suppurative otitis media of both ears without spontaneous rupture of tympanic membranes, recurrence not specified    Other orders  -     amoxicillin (AMOXIL) 250 MG/5ML suspension; Take 7.5 mL by mouth 2 (Two) Times a Day for 10 days.      Drink plenty of fluids   Children's acetaminophen or ibuprofen for fever or pain  Earache and sore throat do not improve in 48 hours follow up  Nasal congestion may last longer. As long as it does not bother her no medications necessary        Patient is critically ill, requiring critical care services.

## 2024-07-03 NOTE — H&P ADULT - HISTORY OF PRESENT ILLNESS
74 yo PMHx of Type A Aortic Dissection Repair ([facility], Hemiarch, LAAL in 9/2022), s/p TAVR (9/2023, size 23 Telma Tristen), asthma on chronic steroids, bronchiectasis s/p surgery, allergic rhinitis,  recurrent PNA, PND, tracheomalacia s/p tracheoplasty, ESBL proteus infections (sputum),  diabetes, paroxysmal A-fib on Eliquis, colorectal cancer many years ago status post colostomy 74 yo PMHx of Type A Aortic Dissection Repair ([facility], Hemiarch, LAAL in 9/2022), s/p TAVR (9/2023, size 23 Telma Tristen), asthma on chronic steroids, bronchiectasis s/p surgery, allergic rhinitis,  recurrent PNA, PND, tracheomalacia s/p tracheoplasty, ESBL proteus infections (sputum),  diabetes, paroxysmal A-fib on Eliquis, colorectal cancer many years ago status post colostomy presents as a transfer from [facility] for type B aortic dissection.  Patient originally presented to [facility] for crushing chest pain described as a heaviness in her chest.  Patient endorses shortness of breath at this time and is not on oxygen at home.  Patient received a CT angio of the chest abdomen pelvis at the outside hospital which showed a type B aortic dissection with great vessel involvement.  All great vessels arise from the true lumen.  There was no sign of false lumen thrombosis or true lumen compression.  Previous aortic valve replacement and aortic root replacement seen.  Mild right middle lobe and right lower lobe bronchiolitis.  1.8 cm pancreatic tail cyst.  Previous AP resection of the rectum and sigmoid colon.  Patient denies any chest pain at this time.  States she had a couple episodes of vomiting yesterday.  Patient was also found to be COVID-positive incidentally.  EMS vitals on arrival were 124/74.  Patient is on a labetalol drip and 0.5.  Pulse is in the 50s. Patient transferred to CTU under Dr. Lozada for further management.

## 2024-07-03 NOTE — CONSULT NOTE ADULT - SUBJECTIVE AND OBJECTIVE BOX
# PULMONARY CONSULTATION NOTE  [facility] DIVISION OF PULMONARY, CRITICAL CARE, AND SLEEP MEDICINE  Office: [phone]    **Patient Name**: RAYRAY RODRIGUEZ  MRN-[id number]    CHIEF COMPLAINT: Patient is a 73y old  Female who presents with a chief complaint of Chest pressure (03 Jul 2024 18:06)      HISTORY OF PRESENT ILLNESS:   HPI:  72 yo PMHx of Type A Aortic Dissection Repair ([facility], Hemiarch, LAAL in 9/2022), s/p TAVR (9/2023, size 23 Telma Tristen), asthma on chronic steroids, bronchiectasis s/p surgery, allergic rhinitis,  recurrent PNA, PND, tracheomalacia s/p tracheoplasty, ESBL proteus infections (sputum),  diabetes, paroxysmal A-fib on Eliquis, colorectal cancer many years ago status post colostomy presents as a transfer from [facility] for type B aortic dissection.  Patient originally presented to [facility] for crushing chest pain described as a heaviness in her chest.  Patient endorses shortness of breath at this time and is not on oxygen at home.  Patient received a CT angio of the chest abdomen pelvis at the outside hospital which showed a type B aortic dissection with great vessel involvement.  All great vessels arise from the true lumen.  There was no sign of false lumen thrombosis or true lumen compression.  Previous aortic valve replacement and aortic root replacement seen.  Mild right middle lobe and right lower lobe bronchiolitis.  1.8 cm pancreatic tail cyst.  Previous AP resection of the rectum and sigmoid colon.  Patient denies any chest pain at this time.  States she had a couple episodes of vomiting yesterday.  Patient was also found to be COVID-positive incidentally.  EMS vitals on arrival were 124/74.  Patient is on a labetalol drip and 0.5.  Pulse is in the 50s. Patient transferred to CTU under Dr. Lozada for further management. (03 Jul 2024 14:19)      # Histories:  ## Family:  FAMILY HISTORY:  Family history of asthma    Family history of breast cancer (Sibling)    Family history of diabetes mellitus type II        ## PMH/PSH:  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      H/O pelvic surgery  5 years ago - s/p fracture      History of tracheomalacia  2015 - attempted tracheal stenting ([facility])- course complicated by obstruction, respiratory failure, multiple CPR attempts -  stent discontinued; 10/20/2016 Tracheobronchoplasty (Prolene Mesh) performed at [facility] by Dr Zapien      S/P bronchoscopy  6/5/2018 - [place] (Dr Zapien) no evidence of tracheobronchomalacia in trachea or bronchial tubes      Rectal bleeding  exam under anesthesia ([facility]) 2/2018      S/P TAVR (transcatheter aortic valve replacement)      S/P aortic valve replacement          Allergies    penicillin (Anaphylaxis)  iodine (Short breath; Swelling)  Valium (Short breath)  OxyContin (Unknown)  meropenem (Unknown)  shellfish (Anaphylaxis)  ampicillin (Unknown)  tetanus toxoid (Short breath)  Percocet (Unknown)  aspirin (Short breath)  Dilaudid (Short breath)  codeine (Short breath)  Avelox (Short breath; Pruritus)  cefepime (Anaphylaxis)    Intolerances         ## Outpatient Pulmonologist: Keegan    # ROS  CONSTITUTIONAL: no fevers, chills, rigors, no headache  CARDIOVASCULAR: +chest pressure (now resolved), no palpitations  PULMONARY: no dyspnea, no cough, no wheezing  GASTROINTESTINAL: no N/V/D  [ x ] ALL OTHER REVIEW OF SYSTEMS ARE NEGATIVE   [ ] UNABLE TO OBTAIN REVIEW OF SYSTEMS DUE TO     ## O/E:  ICU Vital Signs Last 24 Hrs  T(C): 37.1 (04 Jul 2024 04:00), Max: 37.1 (04 Jul 2024 04:00)  T(F): 98.8 (04 Jul 2024 04:00), Max: 98.8 (04 Jul 2024 04:00)  HR: 60 (04 Jul 2024 06:00) (50 - 111)  BP: 120/56 (03 Jul 2024 18:00) (99/57 - 158/68)  BP(mean): 80 (03 Jul 2024 18:00) (72 - 98)  ABP: 112/47 (04 Jul 2024 06:00) (100/34 - 138/56)  ABP(mean): 70 (04 Jul 2024 06:00) (57 - 88)  RR: 21 (04 Jul 2024 06:00) (14 - 55)  SpO2: 92% (04 Jul 2024 06:00) (87% - 99%)    O2 Parameters below as of 04 Jul 2024 04:00  Patient On (Oxygen Delivery Method): room air          I&O's Summary    03 Jul 2024 07:01  -  04 Jul 2024 07:00  --------------------------------------------------------  IN: 432.5 mL / OUT: 500 mL / NET: -67.5 mL        Gen: lying comfortably in bed in no apparent distress (spO2 98% [2LNC] -> 97% [RA])  HEENT: PERRL  Resp: CTA B/L no c/r/w  CVS: S1S2 no m/r/g  Abd: soft NT/ND +BS  Ext: no c/c/e  Neuro: A&Ox3    # Medications  MEDICATIONS  (STANDING):  albuterol/ipratropium for Nebulization 3 milliLiter(s) Nebulizer every 6 hours  ascorbic acid 500 milliGRAM(s) Oral daily  atorvastatin 20 milliGRAM(s) Oral at bedtime  budesonide 160 MICROgram(s)/formoterol 4.5 MICROgram(s) Inhaler 2 Puff(s) Inhalation daily  gabapentin 100 milliGRAM(s) Oral every 8 hours  heparin   Injectable 5000 Unit(s) SubCutaneous every 8 hours  levETIRAcetam 1000 milliGRAM(s) Oral two times a day  methylPREDNISolone 12 milliGRAM(s) Oral daily  mexiletine 200 milliGRAM(s) Oral every 8 hours  montelukast 10 milliGRAM(s) Oral daily  multivitamin 1 Tablet(s) Oral daily  mupirocin 2% Nasal 1 Application(s) Both Nostrils every 12 hours  niCARdipine Infusion 5 mG/Hr (25 mL/Hr) IV Continuous <Continuous>  pantoprazole    Tablet 40 milliGRAM(s) Oral before breakfast  polyethylene glycol 3350 17 Gram(s) Oral daily  senna 2 Tablet(s) Oral at bedtime  sodium chloride 0.9% lock flush 3 milliLiter(s) IV Push every 8 hours  tiotropium 2.5 MICROgram(s) Inhaler 2 Puff(s) Inhalation daily    MEDICATIONS  (PRN):      ```  ## Labs:  ** CBC: **                        10.6   14.95 )-----------( 217      ( 04 Jul 2024 01:05 )             36.0     ** Chem:  **  07-04    142  |  105  |  26<H>  ----------------------------<  115<H>  4.1   |  23  |  0.66    Ca    9.0      04 Jul 2024 01:05  Phos  3.5     07-04  Mg     2.3     07-04    TPro  6.3  /  Alb  3.3  /  TBili  0.2  /  DBili  x   /  AST  11  /  ALT  14  /  AlkPhos  79  07-04    ** Coags: **  PT/INR - ( 03 Jul 2024 12:58 )   PT: 11.4 sec;   INR: 1.04 ratio         PTT - ( 03 Jul 2024 12:58 )  PTT:30.9 sec    CAPILLARY BLOOD GLUCOSE      POCT Blood Glucose.: 276 mg/dL (03 Jul 2024 09:23)      ABG - ( 04 Jul 2024 00:00 )  pH, Arterial: 7.39  pH, Blood: x     /  pCO2: 45    /  pO2: 78    / HCO3: 27    / Base Excess: 1.8   /  SaO2: 95.2              ```    # Radiology / Imaging  ## CXR (my read):  No infiltrates, generally clear lungs.

## 2024-07-03 NOTE — CONSULT NOTE ADULT - ASSESSMENT
73F PMH asthma, H/O bronchiectasis (s/p resection), tracheomalacia here with stable Type B aortic dissection.  - Patient was saturating well on 2LNC, and I was able to transition her to room air without any difficulties. Would only resume oxygen if spO2 remains consistently <92%.  - Currently her lungs are CTA B/L with no evidence of wheezing. On Solu-Medrol 12mg IV daily, if tolerating PO can be transitioned to prednisone 15mg PO daily.  - Patient is on Advair at home, currently no wheezing; can place her on Symbicort BID for maintenance while she is in the hospital, as well as albuterol NEBs Q4h PRN wheezing.  - No evidence of rhonchi due to bronchiectasis at present and CXR is clear, so no need for hyperSAL for airway clearance at present.  - Rest of care per primary team.

## 2024-07-03 NOTE — H&P ADULT - NSHPREVIEWOFSYSTEMS_GEN_ALL_CORE
General: Denies fever, chills  Neuro: Denies headache, paresthesias, dizziness  Eyes: Denies blurry vision, diplopia  CV: Denies chest pain, palpitations. admits to chest pressure intermittently.  Resp: Denies SOB, cough  : Denies dysuria, hematuria  GI: Denies abdominal pain, N/V  Heme: Denies weakness, tiredness  MSK: Denies joint pain  Psych: Denies anxiety, depression, sylvia  Vasc: Denies leg pain, claudication

## 2024-07-03 NOTE — H&P ADULT - PROBLEM SELECTOR PLAN 4
Neurology consulted, will find outpatient provider  Continue Keppra 1g BID, was increased recently per patient  No active seizures

## 2024-07-03 NOTE — CONSULT NOTE ADULT - TIME BILLING
Reviewed images and labs. Clinical decision making, changes in medication regimen. Discussion with primary team, and patient. This excludes any time spent on separate procedures or teaching.
chest pain  ACS  type B dissection

## 2024-07-03 NOTE — H&P ADULT - NSICDXPASTSURGICALHX_GEN_ALL_CORE_FT
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    H/O pelvic surgery 5 years ago - s/p fracture    H/O total knee replacement, bilateral 5 years ago    History of partial hysterectomy 30 years ago - fibroids    History of sinus surgery multiple sinus surgeries    History of tracheomalacia 2015 - attempted tracheal stenting ([facility])- course complicated by obstruction, respiratory failure, multiple CPR attempts -  stent discontinued; 10/20/2016 Tracheobronchoplasty (Prolene Mesh) performed at [facility] by Dr Zapien    Rectal bleeding exam under anesthesia ([facility]) 2/2018    S/P aortic valve replacement     S/P bronchoscopy 6/5/2018 - [place] (Dr Zapien) no evidence of tracheobronchomalacia in trachea or bronchial tubes    S/P TAVR (transcatheter aortic valve replacement)

## 2024-07-03 NOTE — CONSULT NOTE ADULT - ATTENDING COMMENTS
chest pain r/o ACS - found to have a stable type B dissection  trend CE  TTE pending  no significant ECG changes  chest pain resolved on cardene gtt

## 2024-07-03 NOTE — H&P ADULT - NSHPLABSRESULTS_GEN_ALL_CORE
11.1   10.62 )-----------( 217      ( 03 Jul 2024 12:58 )             37.9       07-03    139  |  103  |  22  ----------------------------<  251<H>  5.2   |  21<L>  |  0.78    Ca    9.1      03 Jul 2024 12:58  Phos  2.9     07-03  Mg     2.3     07-03    TPro  6.9  /  Alb  3.8  /  TBili  0.2  /  DBili  x   /  AST  13  /  ALT  13  /  AlkPhos  87  07-03    PT/INR - ( 03 Jul 2024 12:58 )   PT: 11.4 sec;   INR: 1.04 ratio         PTT - ( 03 Jul 2024 12:58 )  PTT:30.9 sec    CARDIAC MARKERS ( 03 Jul 2024 12:58 )  x     / x     / 76 U/L / x     / 3.7 ng/mL

## 2024-07-03 NOTE — PATIENT PROFILE ADULT - FUNCTIONAL ASSESSMENT - DAILY ACTIVITY 5.
Anesthesia Post-op Note    Patient: Alon Pearson  Procedure(s) Performed: COLONOSCOPY  Anesthesia type: MAC    Vitals Value Taken Time   Temp 98.1 05/11/23 1045   Pulse 73 05/11/23 0930   Resp 16 05/11/23 0930   SpO2 96 % 05/11/23 0930   /76 05/11/23 0930         Patient Location: bedside  Post-op Vital Signs:stable  Level of Consciousness: participates in exam and awake  Respiratory Status: spontaneous ventilation  Cardiovascular blood pressure returned to baseline  Hydration: euvolemic  Pain Management: adequately controlled  Handoff: Handoff to receiving clinician was performed and questions were answered  Vomiting: none  Nausea: None  Airway Patency:patent  Post-op Assessment: awake, alert, appropriately conversant, or baseline      There were no known notable events for this encounter.  
Underweight (BMI < 19)
2 = A lot of assistance

## 2024-07-03 NOTE — CONSULT NOTE ADULT - SUBJECTIVE AND OBJECTIVE BOX
HISTORY OF PRESENT ILLNESS:  Ms. Bloom is a 73 year old woman with PMH A-fib, COPD, diabetes, TAVR, MGUS, tracheomalacia, AAA rupture 1 year ago, bronchiectasis, aortic dissection, colon cancer status post chemo/radiation and ostomy, adrenal insufficiency presents as a transfer from [facility] for type B aortic dissection.  Patient originally presented to [facility] for crushing chest pain described as a heaviness in her chest.  Patient endorses shortness of breath at this time and is not on oxygen at home.    At [facility] CTAP with concern for type B aortic dissection. Patient started on labetalol drip and transferred to [facility]. Upon arrival to this ED, patient continues to complain of chest pressure. Reports that her symptoms began about a week ago. She thought that it was her asthma and increased her steroids, however when this did not improve her symptoms she became worried. She notes that she also had trouble laying down at night. She denies fevers or chills. She has had occasional nausea and normal nonbloody ostomy output. Reports intermittent swelling in her left foot but no other complaints.       PAST MEDICAL HISTORY: Atrial fibrillation    Diabetes    Hypertension    COPD (chronic obstructive pulmonary disease)    Adrenal insufficiency    Aortic insufficiency    Pelvic fracture    Asthma    Hypertension    Tracheobronchomalacia    Colorectal cancer    Aortic disease    Rectal bleeding    Seizure    DVT (deep venous thrombosis)    TIA (transient ischemic attack)        PAST SURGICAL HISTORY: History of partial hysterectomy    H/O total knee replacement, bilateral    History of sinus surgery    Exostosis of orbit, left    H/O pelvic surgery    History of surgery    History of tracheomalacia    S/P bronchoscopy    Rectal bleeding    S/P TAVR (transcatheter aortic valve replacement)    S/P aortic valve replacement        HOME MEDICATIONS:    ALLERGIES: penicillin (Anaphylaxis)  iodine (Short breath; Swelling)  Valium (Short breath)  OxyContin (Unknown)  shellfish (Anaphylaxis)  ampicillin (Unknown)  tetanus toxoid (Short breath)  aspirin (Short breath)  Dilaudid (Short breath)  codeine (Short breath)  Avelox (Short breath; Pruritus)  cefepime (Anaphylaxis)      FAMILY HISTORY: Family history of asthma    Family history of breast cancer (Sibling)    Family history of diabetes mellitus type II        SOCIAL HISTORY:    REVIEW OF SYSTEMS:    VITAL SIGNS:  ICU Vital Signs Last 24 Hrs  T(C): 36.7 (02 Jul 2024 23:44), Max: 36.7 (02 Jul 2024 23:44)  T(F): 98.1 (02 Jul 2024 23:44), Max: 98.1 (02 Jul 2024 23:44)  HR: 58 (03 Jul 2024 02:24) (50 - 58)  BP: 107/71 (03 Jul 2024 02:24) (107/71 - 143/62)  BP(mean): 82 (03 Jul 2024 02:24) (69 - 82)  ABP: --  ABP(mean): --  RR: 18 (03 Jul 2024 02:21) (16 - 18)  SpO2: 97% (03 Jul 2024 02:21) (95% - 97%)    O2 Parameters below as of 03 Jul 2024 02:21  Patient On (Oxygen Delivery Method): room air            PHYSICAL EXAMINATION:  General - well-nourished, no acute distress  Neuro - awake, alert, oriented x4, no acute focal deficits  Lungs - breathing comfortably on nasal cannula   Heart - pulse normal   Abdomen - soft, nontender, nondistended  Extremities - all four extremities are warm, strength and sensation intact in bilateral upper and lower extremities  Pulse exam:  - bilateral carotid pulses palpable  - bilateral axial, brachial, radial, and ulnar pulses palpable  - bilateral femoral, popliteal, and DPs palpable  - bilateral PT signals present      LABS:                          11.8   9.78  )-----------( 213      ( 02 Jul 2024 23:30 )             38.8       07-03    138  |  101  |  20  ----------------------------<  352<H>  5.4<H>   |  20<L>  |  0.74    Ca    9.5      03 Jul 2024 00:21  Mg     2.2     07-02    TPro  7.1  /  Alb  3.5  /  TBili  0.2  /  DBili  x   /  AST  30  /  ALT  18  /  AlkPhos  94  07-02      PT/INR - ( 02 Jul 2024 23:48 )   PT: 12.3 sec;   INR: 1.18 ratio         PTT - ( 02 Jul 2024 23:48 )  PTT:33.2 sec        VBG - ( 02 Jul 2024 23:41 )  pH: 7.41  /  pCO2: 47    /  pO2: 66    / HCO3: 30    / Base Excess: 4.4   /  SaO2: 91.5   Lactate: 1.7                RECENT CULTURES:      CAPILLARY BLOOD GLUCOSE          IMAGING STUDIES:

## 2024-07-03 NOTE — H&P ADULT - ASSESSMENT
72 yo PMHx of Type A Aortic Dissection Repair (Bentall, Hemiarch, LAAL in 9/2022), s/p TAVR (9/2023, size 23 Telma Tristen), asthma on chronic steroids, bronchiectasis s/p surgery, allergic rhinitis,  recurrent PNA, PND, tracheomalacia s/p tracheoplasty, ESBL proteus infections (sputum),  diabetes, paroxysmal A-fib on Eliquis, colorectal cancer many years ago status post colostomy presents as a transfer from [facility] for type B aortic dissection.  Patient originally presented to [facility] for crushing chest pain described as a heaviness in her chest.  Patient endorses shortness of breath at this time and is not on oxygen at home.  Patient received a CT angio of the chest abdomen pelvis at the outside hospital which showed a type B aortic dissection with great vessel involvement.  All great vessels arise from the true lumen.  There was no sign of false lumen thrombosis or true lumen compression.  Previous aortic valve replacement and aortic root replacement seen.  Mild right middle lobe and right lower lobe bronchiolitis.  1.8 cm pancreatic tail cyst.  Previous AP resection of the rectum and sigmoid colon.  Patient denies any chest pain at this time.  States she had a couple episodes of vomiting yesterday.  Patient was also found to be COVID-positive incidentally.  EMS vitals on arrival were 124/74.  Patient is on a labetalol drip and 0.5.  Pulse is in the 50s. Patient transferred to CTU under Dr. Lozada for further management.

## 2024-07-03 NOTE — PROGRESS NOTE ADULT - SUBJECTIVE AND OBJECTIVE BOX
Patient seen and examined at the bedside.    Remained critically ill on continuous ICU monitoring.    OBJECTIVE:  Vital Signs Last 24 Hrs  T(C): 36.6 (03 Jul 2024 16:00), Max: 36.8 (03 Jul 2024 04:01)  T(F): 97.9 (03 Jul 2024 16:00), Max: 98.3 (03 Jul 2024 04:01)  HR: 58 (03 Jul 2024 17:55) (50 - 111)  BP: 103/51 (03 Jul 2024 16:00) (99/57 - 158/68)  BP(mean): 73 (03 Jul 2024 16:00) (69 - 98)  RR: 24 (03 Jul 2024 17:30) (14 - 55)  SpO2: 94% (03 Jul 2024 17:55) (90% - 99%)    Parameters below as of 03 Jul 2024 16:00  Patient On (Oxygen Delivery Method): room air      Physical Exam:   General: NAD   Neurology: nonfocal   ENT/Neck: Neck supple, trachea midline, No JVD   Respiratory: Clear bilaterally   CV: S1S2, no murmurs        [x] Sternal dressing, [x] Mediastinal CT, [x] Pleural CT, [x] LACI drain        [x] Sinus rhythm, [x] Afib, [x] Temporary pacing, [x] PPM  Abdominal: Soft, NT, ND +BS   Extremities: 1-2+ pedal edema noted   ============================I/O===========================   I&O's Detail    03 Jul 2024 07:01  -  03 Jul 2024 18:06  --------------------------------------------------------  IN:    NiCARdipine: 137.5 mL  Total IN: 137.5 mL    OUT:    Esmolol: 0 mL    Labetalol: 0 mL    Voided (mL): 150 mL  Total OUT: 150 mL    Total NET: -12.5 mL  ============================ LABS =========================                        11.1   10.62 )-----------( 217      ( 03 Jul 2024 12:58 )             37.9     07-03    x   |  x   |  x   ----------------------------<  x   4.7   |  x   |  x     Ca    9.1      03 Jul 2024 12:58  Phos  2.9     07-03  Mg     2.3     07-03    TPro  6.9  /  Alb  3.8  /  TBili  0.2  /  DBili  x   /  AST  13  /  ALT  13  /  AlkPhos  87  07-03    LIVER FUNCTIONS - ( 03 Jul 2024 12:58 )  Alb: 3.8 g/dL / Pro: 6.9 g/dL / ALK PHOS: 87 U/L / ALT: 13 U/L / AST: 13 U/L / GGT: x           PT/INR - ( 03 Jul 2024 12:58 )   PT: 11.4 sec;   INR: 1.04 ratio         PTT - ( 03 Jul 2024 12:58 )  PTT:30.9 sec  ABG - ( 03 Jul 2024 12:30 )  pH, Arterial: 7.43  pH, Blood: x     /  pCO2: 37    /  pO2: 131   / HCO3: 25    / Base Excess: 0.4   /  SaO2: 96.8    ======================Micro/Rad/Cardio=================  Culture: Reviewed   CXR: Reviewed  Echo: Reviewed  ======================================================  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      H/O pelvic surgery  5 years ago - s/p fracture      History of tracheomalacia  2015 - attempted tracheal stenting ([facility])- course complicated by obstruction, respiratory failure, multiple CPR attempts -  stent discontinued; 10/20/2016 Tracheobronchoplasty (Prolene Mesh) performed at [facility] by Dr Zapien      S/P bronchoscopy  6/5/2018 - [facility] (Dr Zapien) no evidence of tracheobronchomalacia in trachea or bronchial tubes      Rectal bleeding  exam under anesthesia ([facility]) 2/2018      S/P TAVR (transcatheter aortic valve replacement)      S/P aortic valve replacement  ======================================================  Assessment:  74 yo PMHx of Type A Aortic Dissection Repair (Bentall, Hemiarch, LAAL in 9/2022), s/p TAVR (9/2023, size 23 Telma Tristen), asthma on chronic steroids, bronchiectasis s/p surgery, allergic rhinitis,  recurrent PNA, PND, tracheomalacia s/p tracheoplasty, ESBL proteus infections (sputum),  diabetes, paroxysmal A-fib on Eliquis, colorectal cancer many years ago status post colostomy presents as a transfer from [facility] for type B aortic dissection.  Patient originally presented to [facility] for crushing chest pain described as a heaviness in her chest.  Patient endorses shortness of breath at this time and is not on oxygen at home.  Patient received a CT angio of the chest abdomen pelvis at the outside hospital which showed a type B aortic dissection with great vessel involvement.  All great vessels arise from the true lumen.  There was no sign of false lumen thrombosis or true lumen compression.  Previous aortic valve replacement and aortic root replacement seen.  Mild right middle lobe and right lower lobe bronchiolitis.  1.8 cm pancreatic tail cyst.  Previous AP resection of the rectum and sigmoid colon.  Patient denies any chest pain at this time.  States she had a couple episodes of vomiting yesterday.  Patient was also found to be COVID-positive incidentally.  EMS vitals on arrival were 124/74.  Patient is on a labetalol drip and 0.5.  Pulse is in the 50s. Patient transferred to CTU under Dr. Lozada for further management.      Pre-op for TAVR workup 7/3/24, S?P TAVR 9/2023  Diabetes Mellitus   Hypertension  Hemodynamic Instability  ======================================================  Plan:   ***Neuro***  [x] Hx of CVA   [x] Nonfocal    Pain management with Gabapentin  On Keppra for seizures       ***Cardiovascular***  Invasive hemodynamic monitoring, assess perfusion indices   SR /  MAP 58  / Hct 37.9 / Lactate 1.7   [x} Mexiletine for rate control   [x] Cardene 5 mgs/hr  Reassessment of hemodynamics post resuscitation   Monitor chest tube outputs  [x] Statin     ***Pulmonary***  [x] Room Air   Encourage incentive spirometry, continue pulse ox monitoring, follow ABGs     ***GI***  [x] NPO  [x] Pepcid  Bowel regimen with Miralax and Senna     ***Renal***  GFR 79   Continue to monitor I/Os, BUN/Creatinine.   Replete lytes PRN  Amezcua present    ***ID***  No active antibiotic coverage      ***Endocrine***  [x] DM2 : HbA1c 9.3%                - [x] Insulin gtt               - Need tight glycemic control to prevent wound infection.      Patient requires continuous monitoring with:  bedside rhythm monitoring, arterial line, pulse oximetry, ventilator management and monitoring; intermittent blood gas analysis.  Care plan discussed with ICU care team.  patient remain critical; required more than usual post op care; I have spent 35 minutes providing non routine post op care, revaluated multiple times during the day .     Care Plan discussed with the ICU care team. Patient remained critical, at risk for life threatening decompensation.     By signing my name below, I, Mary Jo Hewitt , attest that this documentation has been prepared under the direction and in the presence of Jethro Nur MD.  Electronically signed: Georgi Gabriel, 07-03-24 @ 18:06    Liudmila GAVIRIA, personally performed the services described in this documentation. all medical record entries made by the scribe were at my direction and in my presence. I have reviewed the chart and agree that the record reflects my personal performance and is accurate and complete  Electronically signed: Jethro Nur MD. Error

## 2024-07-03 NOTE — H&P ADULT - NSHPPHYSICALEXAM_GEN_ALL_CORE
Physical Exam:  General: Pleasant appearing in NAD.  Neuro: AAO x4, no deficits noted. Strength equal  Skin: No erythema, ecchymosis.  Head: NCAT.  Eyes: Pupils equal and reactive.  Neck: No JVD.  CV: S1S2, regular rhythm.  Pulm: Coarse breath sounds throughout.  : Atraumatic.  GI: Abd soft, NT, ND, colostomy in place.  Vasc: palpable distal pulses b/l.  MSK: Equal ROM throughout.

## 2024-07-03 NOTE — CONSULT NOTE ADULT - SUBJECTIVE AND OBJECTIVE BOX
Cardiology Consult Note     07-03-24    HPI:   73 year old woman with PMH A-fib, COPD, diabetes, TAVR, MGUS, tracheomalacia, AAA rupture 1 year ago, bronchiectasis, aortic dissection, colon cancer status post chemo/radiation and ostomy, adrenal insufficiency presents as a transfer from [facility] for type B aortic dissection.    ROS (if any):    MEDICATIONS  (STANDING):  ascorbic acid 500 milliGRAM(s) Oral daily  atorvastatin 20 milliGRAM(s) Oral at bedtime  budesonide 160 MICROgram(s)/formoterol 4.5 MICROgram(s) Inhaler 2 Puff(s) Inhalation two times a day  famotidine    Tablet 20 milliGRAM(s) Oral daily  gabapentin 100 milliGRAM(s) Oral every 8 hours  levETIRAcetam 1000 milliGRAM(s) Oral two times a day  montelukast 10 milliGRAM(s) Oral daily  niCARdipine Infusion 5 mG/Hr (25 mL/Hr) IV Continuous <Continuous>  sodium chloride 0.9% lock flush 3 milliLiter(s) IV Push every 8 hours    MEDICATIONS  (PRN):  albuterol    90 MICROgram(s) HFA Inhaler 2 Puff(s) Inhalation every 6 hours PRN Shortness of Breath and/or Wheezing          PHYSICAL EXAM:  Vital Signs Last 24 Hrs  T(C): 36.6 (03 Jul 2024 12:00), Max: 36.8 (03 Jul 2024 04:01)  T(F): 97.9 (03 Jul 2024 12:00), Max: 98.3 (03 Jul 2024 04:01)  HR: 53 (03 Jul 2024 14:00) (50 - 111)  BP: 110/56 (03 Jul 2024 14:00) (99/57 - 158/68)  BP(mean): 80 (03 Jul 2024 14:00) (69 - 98)  RR: 19 (03 Jul 2024 14:00) (14 - 55)  SpO2: 96% (03 Jul 2024 14:00) (95% - 99%)    Parameters below as of 03 Jul 2024 12:00  Patient On (Oxygen Delivery Method): nasal cannula  O2 Flow (L/min): 2      I&O's Summary    03 Jul 2024 07:01  -  03 Jul 2024 14:18  --------------------------------------------------------  IN: 92.5 mL / OUT: 0 mL / NET: 92.5 mL        General: NAD, non-toxic appearing   HEENT: PERRLA, EOMi, no scleral icterus  CV: RRR, normal S1 and S2, no m/r/g  Lungs: normal respiratory effort. CTAB, no wheezes, rales, or rhonchi  Abd: soft, nontender, nondistended  Ext: no edema, 2+ peripheral pulses   Pysch: AAOx3, appropriate affect   Neuro: grossly non-focal, moving all extremities spontaneously   Skin: no rashes or lesions     LVAD  Speed: (RPM)  Flow:  PI:   Power: (Martinez)     LABS:  CAPILLARY BLOOD GLUCOSE      POCT Blood Glucose.: 276 mg/dL (03 Jul 2024 09:23)  POCT Blood Glucose.: 303 mg/dL (03 Jul 2024 07:13)                              11.1   10.62 )-----------( 217      ( 03 Jul 2024 12:58 )             37.9       WBC Trend: 10.62<--, 9.78<--  Hb Trend: 11.1<--, 11.8<--    07-03    139  |  103  |  22  ----------------------------<  251<H>  5.2   |  21<L>  |  0.78    Ca    9.1      03 Jul 2024 12:58  Phos  2.9     07-03  Mg     2.3     07-03    TPro  6.9  /  Alb  3.8  /  TBili  0.2  /  DBili  x   /  AST  13  /  ALT  13  /  AlkPhos  87  07-03    PT/INR - ( 03 Jul 2024 12:58 )   PT: 11.4 sec;   INR: 1.04 ratio         PTT - ( 03 Jul 2024 12:58 )  PTT:30.9 sec  CARDIAC MARKERS ( 03 Jul 2024 12:58 )  x     / x     / 76 U/L / x     / 3.7 ng/mL      Urinalysis Basic - ( 03 Jul 2024 12:58 )    Color: x / Appearance: x / SG: x / pH: x  Gluc: 251 mg/dL / Ketone: x  / Bili: x / Urobili: x   Blood: x / Protein: x / Nitrite: x   Leuk Esterase: x / RBC: x / WBC x   Sq Epi: x / Non Sq Epi: x / Bacteria: x            RADIOLOGY & ADDITIONAL TESTS: Reviewed Cardiology Consult Note     07-03-24    HPI:   72 yo PMHx of Type A Aortic Dissection Repair (Bentall, Hemiarch, LAAL in 9/2022), s/p TAVR (9/2023, size 23 Telma Tristen), asthma on chronic steroids, bronchiectasis s/p surgery, allergic rhinitis,  recurrent PNA, PND, tracheomalacia s/p tracheoplasty, ESBL proteus infections (sputum),  diabetes, paroxysmal A-fib on Eliquis, colorectal cancer many years ago status post colostomy presents as a transfer from [facility] for type B aortic dissection. She was transferred directly to the CICU. She mentions that she has experienced this in the past before and it felt similar. She mentions that she has asthma as well her BP would increase whenever she has trouble breathing. At this moment, she denies chest pain, SOB, n/v/c/d.     ROS (if any):    MEDICATIONS  (STANDING):  ascorbic acid 500 milliGRAM(s) Oral daily  atorvastatin 20 milliGRAM(s) Oral at bedtime  budesonide 160 MICROgram(s)/formoterol 4.5 MICROgram(s) Inhaler 2 Puff(s) Inhalation two times a day  famotidine    Tablet 20 milliGRAM(s) Oral daily  gabapentin 100 milliGRAM(s) Oral every 8 hours  levETIRAcetam 1000 milliGRAM(s) Oral two times a day  montelukast 10 milliGRAM(s) Oral daily  niCARdipine Infusion 5 mG/Hr (25 mL/Hr) IV Continuous <Continuous>  sodium chloride 0.9% lock flush 3 milliLiter(s) IV Push every 8 hours    MEDICATIONS  (PRN):  albuterol    90 MICROgram(s) HFA Inhaler 2 Puff(s) Inhalation every 6 hours PRN Shortness of Breath and/or Wheezing          PHYSICAL EXAM:  Vital Signs Last 24 Hrs  T(C): 36.6 (03 Jul 2024 12:00), Max: 36.8 (03 Jul 2024 04:01)  T(F): 97.9 (03 Jul 2024 12:00), Max: 98.3 (03 Jul 2024 04:01)  HR: 53 (03 Jul 2024 14:00) (50 - 111)  BP: 110/56 (03 Jul 2024 14:00) (99/57 - 158/68)  BP(mean): 80 (03 Jul 2024 14:00) (69 - 98)  RR: 19 (03 Jul 2024 14:00) (14 - 55)  SpO2: 96% (03 Jul 2024 14:00) (95% - 99%)    Parameters below as of 03 Jul 2024 12:00  Patient On (Oxygen Delivery Method): nasal cannula  O2 Flow (L/min): 2      I&O's Summary    03 Jul 2024 07:01  -  03 Jul 2024 14:18  --------------------------------------------------------  IN: 92.5 mL / OUT: 0 mL / NET: 92.5 mL        General: NAD, non-toxic appearing +NC  HEENT: PERRLA, EOMi, no scleral icterus  CV: RRR, normal S1 and S2, no m/r/g  Lungs: normal respiratory effort. CTAB, no wheezes, rales, or rhonchi  Abd: soft, nontender, nondistended  Ext: no edema, 2+ peripheral pulses       LABS:  CAPILLARY BLOOD GLUCOSE      POCT Blood Glucose.: 276 mg/dL (03 Jul 2024 09:23)  POCT Blood Glucose.: 303 mg/dL (03 Jul 2024 07:13)                              11.1   10.62 )-----------( 217      ( 03 Jul 2024 12:58 )             37.9       WBC Trend: 10.62<--, 9.78<--  Hb Trend: 11.1<--, 11.8<--    07-03    139  |  103  |  22  ----------------------------<  251<H>  5.2   |  21<L>  |  0.78    Ca    9.1      03 Jul 2024 12:58  Phos  2.9     07-03  Mg     2.3     07-03    TPro  6.9  /  Alb  3.8  /  TBili  0.2  /  DBili  x   /  AST  13  /  ALT  13  /  AlkPhos  87  07-03    PT/INR - ( 03 Jul 2024 12:58 )   PT: 11.4 sec;   INR: 1.04 ratio         PTT - ( 03 Jul 2024 12:58 )  PTT:30.9 sec  CARDIAC MARKERS ( 03 Jul 2024 12:58 )  x     / x     / 76 U/L / x     / 3.7 ng/mL      Urinalysis Basic - ( 03 Jul 2024 12:58 )    Color: x / Appearance: x / SG: x / pH: x  Gluc: 251 mg/dL / Ketone: x  / Bili: x / Urobili: x   Blood: x / Protein: x / Nitrite: x   Leuk Esterase: x / RBC: x / WBC x   Sq Epi: x / Non Sq Epi: x / Bacteria: x            RADIOLOGY & ADDITIONAL TESTS: Reviewed

## 2024-07-03 NOTE — H&P ADULT - PROBLEM SELECTOR PLAN 1
Discussed with Dr. Kierra Jimenez  -BP control strict with Cardene drip  - Convert to PO meds  - Spoke with radiology, compared most previous CT scan with the CT scan from this admission, looks unchanged per Radiology.

## 2024-07-03 NOTE — CONSULT NOTE ADULT - ASSESSMENT
Ms. Bloom is a 73 year old woman with multiple medical comorbidities and history of Type A aortic dissection with Bentall procedure presenting with concern for Type B dissection on imaging from outside hospital.    Recommendations:  - patient currently stable in the ED  - please change labetalol to esmolol drip and maintain systolic BP <120  - awaiting CT imaging to arrive from [facility] (ETA 4am)  - following closely, further management will be determined     discussed with Dr. Recinos Ms. Bloom is a 73 year old woman with multiple medical comorbidities and history of Type A aortic dissection with Bentall procedure presenting with concern for Type B dissection on imaging from outside hospital.    Recommendations:  - patient currently stable in the ED  - please change labetalol to esmolol drip and maintain systolic BP <120  - awaiting CT imaging to arrive from [facility]  - CT surgey consulted given extensive past cardiac history  - following closely, further management will be determined     discussed with Dr. Recinos Ms. Blomo is a 73 year old woman with multiple medical comorbidities and history of Type A aortic dissection with Bentall procedure presenting with concern for Type B dissection on imaging from outside hospital.    Recommendations:  - patient currently stable in the ED  - please change labetalol to esmolol drip and maintain systolic BP <120  - awaiting CT imaging to arrive from [facility]  - CT surgey consulted given extensive past cardiac surgical history  - following closely, further management will be determined     discussed with Dr. Recinos

## 2024-07-03 NOTE — CONSULT NOTE ADULT - ASSESSMENT
72 yo PMHx of Type A Aortic Dissection Repair (Bentall, Hemiarch, LAAL in 9/2022), s/p TAVR (9/2023, size 23 Telma Tristen), asthma on chronic steroids, bronchiectasis s/p surgery, allergic rhinitis,  recurrent PNA, PND, tracheomalacia s/p tracheoplasty, ESBL proteus infections (sputum),  diabetes, paroxysmal A-fib on Eliquis, colorectal cancer many years ago status post colostomy presenting for a Type B aortic dissection w/ uncontrolled hypertension requiring CTICU admission. Cardiology consulted to r/o ACS.     #Type B dissection  Currently without chest pain. Admitted to CTICU, imaging with evidence of type B without involvement of the aortic root/coronaries. Trops negative. EKG reviewed and similar to prior with RBBB morphology and no ST-segment changes.   > consults: vascular, CTS, cardiology  - no concern for ACS at this moment   - recommended treatment for BP as per vascular/CTS, currently on nicardipine gtt and well controlled  - repeat TTE, prior performed 9/23 without evidence of valvular malfunction     Case discussed with Dr. Fleming and Dr. Lake.

## 2024-07-04 PROBLEM — K62.5 HEMORRHAGE OF ANUS AND RECTUM: Chronic | Status: INACTIVE | Noted: 2018-01-31 | Resolved: 2024-07-03

## 2024-07-04 PROBLEM — J39.8 OTHER SPECIFIED DISEASES OF UPPER RESPIRATORY TRACT: Chronic | Status: INACTIVE | Noted: 2017-04-12 | Resolved: 2024-07-03

## 2024-07-04 PROBLEM — C19 MALIGNANT NEOPLASM OF RECTOSIGMOID JUNCTION: Chronic | Status: INACTIVE | Noted: 2017-09-18 | Resolved: 2024-07-03

## 2024-07-04 LAB
A1C WITH ESTIMATED AVERAGE GLUCOSE RESULT: 7.8 % — HIGH (ref 4–5.6)
ALBUMIN SERPL ELPH-MCNC: 3.3 G/DL — SIGNIFICANT CHANGE UP (ref 3.3–5)
ALP SERPL-CCNC: 79 U/L — SIGNIFICANT CHANGE UP (ref 40–120)
ALT FLD-CCNC: 14 U/L — SIGNIFICANT CHANGE UP (ref 10–45)
ANION GAP SERPL CALC-SCNC: 14 MMOL/L — SIGNIFICANT CHANGE UP (ref 5–17)
AST SERPL-CCNC: 11 U/L — SIGNIFICANT CHANGE UP (ref 10–40)
BASOPHILS # BLD AUTO: 0.02 K/UL — SIGNIFICANT CHANGE UP (ref 0–0.2)
BASOPHILS NFR BLD AUTO: 0.1 % — SIGNIFICANT CHANGE UP (ref 0–2)
BILIRUB SERPL-MCNC: 0.2 MG/DL — SIGNIFICANT CHANGE UP (ref 0.2–1.2)
BUN SERPL-MCNC: 26 MG/DL — HIGH (ref 7–23)
CALCIUM SERPL-MCNC: 9 MG/DL — SIGNIFICANT CHANGE UP (ref 8.4–10.5)
CHLORIDE SERPL-SCNC: 105 MMOL/L — SIGNIFICANT CHANGE UP (ref 96–108)
CO2 SERPL-SCNC: 23 MMOL/L — SIGNIFICANT CHANGE UP (ref 22–31)
CREAT SERPL-MCNC: 0.66 MG/DL — SIGNIFICANT CHANGE UP (ref 0.5–1.3)
EGFR: 91 ML/MIN/1.73M2 — SIGNIFICANT CHANGE UP
EOSINOPHIL # BLD AUTO: 0 K/UL — SIGNIFICANT CHANGE UP (ref 0–0.5)
EOSINOPHIL NFR BLD AUTO: 0 % — SIGNIFICANT CHANGE UP (ref 0–6)
ESTIMATED AVERAGE GLUCOSE: 177 MG/DL — HIGH (ref 68–114)
GAS PNL BLDA: SIGNIFICANT CHANGE UP
GLUCOSE BLDC GLUCOMTR-MCNC: 149 MG/DL — HIGH (ref 70–99)
GLUCOSE BLDC GLUCOMTR-MCNC: 231 MG/DL — HIGH (ref 70–99)
GLUCOSE BLDC GLUCOMTR-MCNC: 280 MG/DL — HIGH (ref 70–99)
GLUCOSE SERPL-MCNC: 115 MG/DL — HIGH (ref 70–99)
HCT VFR BLD CALC: 36 % — SIGNIFICANT CHANGE UP (ref 34.5–45)
HGB BLD-MCNC: 10.6 G/DL — LOW (ref 11.5–15.5)
IMM GRANULOCYTES NFR BLD AUTO: 0.8 % — SIGNIFICANT CHANGE UP (ref 0–0.9)
LYMPHOCYTES # BLD AUTO: 0.94 K/UL — LOW (ref 1–3.3)
LYMPHOCYTES # BLD AUTO: 6.3 % — LOW (ref 13–44)
MAGNESIUM SERPL-MCNC: 2.3 MG/DL — SIGNIFICANT CHANGE UP (ref 1.6–2.6)
MCHC RBC-ENTMCNC: 20.5 PG — LOW (ref 27–34)
MCHC RBC-ENTMCNC: 29.4 GM/DL — LOW (ref 32–36)
MCV RBC AUTO: 69.5 FL — LOW (ref 80–100)
MONOCYTES # BLD AUTO: 0.83 K/UL — SIGNIFICANT CHANGE UP (ref 0–0.9)
MONOCYTES NFR BLD AUTO: 5.6 % — SIGNIFICANT CHANGE UP (ref 2–14)
NEUTROPHILS # BLD AUTO: 13.04 K/UL — HIGH (ref 1.8–7.4)
NEUTROPHILS NFR BLD AUTO: 87.2 % — HIGH (ref 43–77)
NRBC # BLD: 0 /100 WBCS — SIGNIFICANT CHANGE UP (ref 0–0)
PHOSPHATE SERPL-MCNC: 3.5 MG/DL — SIGNIFICANT CHANGE UP (ref 2.5–4.5)
PLATELET # BLD AUTO: 217 K/UL — SIGNIFICANT CHANGE UP (ref 150–400)
POTASSIUM SERPL-MCNC: 4.1 MMOL/L — SIGNIFICANT CHANGE UP (ref 3.5–5.3)
POTASSIUM SERPL-SCNC: 4.1 MMOL/L — SIGNIFICANT CHANGE UP (ref 3.5–5.3)
PROT SERPL-MCNC: 6.3 G/DL — SIGNIFICANT CHANGE UP (ref 6–8.3)
RBC # BLD: 5.18 M/UL — SIGNIFICANT CHANGE UP (ref 3.8–5.2)
RBC # FLD: 23 % — HIGH (ref 10.3–14.5)
SODIUM SERPL-SCNC: 142 MMOL/L — SIGNIFICANT CHANGE UP (ref 135–145)
WBC # BLD: 14.95 K/UL — HIGH (ref 3.8–10.5)
WBC # FLD AUTO: 14.95 K/UL — HIGH (ref 3.8–10.5)

## 2024-07-04 PROCEDURE — 71045 X-RAY EXAM CHEST 1 VIEW: CPT | Mod: 26

## 2024-07-04 PROCEDURE — 99291 CRITICAL CARE FIRST HOUR: CPT

## 2024-07-04 RX ORDER — METHYLPREDNISOLONE ACETATE 20 MG/ML
12 VIAL (ML) INJECTION DAILY
Refills: 0 | Status: DISCONTINUED | OUTPATIENT
Start: 2024-07-04 | End: 2024-07-08

## 2024-07-04 RX ORDER — SODIUM CHLORIDE 3 G/100ML
150 INJECTION, SOLUTION INTRAVENOUS ONCE
Refills: 0 | Status: DISCONTINUED | OUTPATIENT
Start: 2024-07-04 | End: 2024-07-04

## 2024-07-04 RX ORDER — INSULIN LISPRO 100 [IU]/ML
INJECTION, SOLUTION SUBCUTANEOUS
Refills: 0 | Status: DISCONTINUED | OUTPATIENT
Start: 2024-07-04 | End: 2024-07-05

## 2024-07-04 RX ORDER — LABETALOL HYDROCHLORIDE 300 MG/1
100 TABLET ORAL EVERY 8 HOURS
Refills: 0 | Status: DISCONTINUED | OUTPATIENT
Start: 2024-07-04 | End: 2024-07-08

## 2024-07-04 RX ORDER — INSULIN LISPRO 100 [IU]/ML
INJECTION, SOLUTION SUBCUTANEOUS AT BEDTIME
Refills: 0 | Status: DISCONTINUED | OUTPATIENT
Start: 2024-07-04 | End: 2024-07-08

## 2024-07-04 RX ORDER — HYDRALAZINE HYDROCHLORIDE 50 MG/1
25 TABLET ORAL EVERY 8 HOURS
Refills: 0 | Status: DISCONTINUED | OUTPATIENT
Start: 2024-07-04 | End: 2024-07-08

## 2024-07-04 RX ADMIN — Medication 2 TABLET(S): at 22:08

## 2024-07-04 RX ADMIN — Medication 12 MILLIGRAM(S): at 13:12

## 2024-07-04 RX ADMIN — Medication 100 MILLIGRAM(S): at 13:12

## 2024-07-04 RX ADMIN — Medication 3 MILLILITER(S): at 13:18

## 2024-07-04 RX ADMIN — Medication 100 MILLIGRAM(S): at 05:01

## 2024-07-04 RX ADMIN — MEXILETINE HYDROCHLORIDE 200 MILLIGRAM(S): 250 CAPSULE ORAL at 22:11

## 2024-07-04 RX ADMIN — Medication 500 MILLIGRAM(S): at 11:22

## 2024-07-04 RX ADMIN — INSULIN LISPRO 2: 100 INJECTION, SOLUTION SUBCUTANEOUS at 22:09

## 2024-07-04 RX ADMIN — MEXILETINE HYDROCHLORIDE 200 MILLIGRAM(S): 250 CAPSULE ORAL at 13:12

## 2024-07-04 RX ADMIN — MEXILETINE HYDROCHLORIDE 200 MILLIGRAM(S): 250 CAPSULE ORAL at 05:01

## 2024-07-04 RX ADMIN — IPRATROPIUM BROMIDE AND ALBUTEROL SULFATE 3 MILLILITER(S): .5; 3 SOLUTION RESPIRATORY (INHALATION) at 17:30

## 2024-07-04 RX ADMIN — HYDRALAZINE HYDROCHLORIDE 25 MILLIGRAM(S): 50 TABLET ORAL at 09:34

## 2024-07-04 RX ADMIN — LABETALOL HYDROCHLORIDE 100 MILLIGRAM(S): 300 TABLET ORAL at 09:34

## 2024-07-04 RX ADMIN — Medication 3 MILLILITER(S): at 05:16

## 2024-07-04 RX ADMIN — IPRATROPIUM BROMIDE AND ALBUTEROL SULFATE 3 MILLILITER(S): .5; 3 SOLUTION RESPIRATORY (INHALATION) at 23:16

## 2024-07-04 RX ADMIN — Medication 3 MILLILITER(S): at 22:00

## 2024-07-04 RX ADMIN — HEPARIN SODIUM 5000 UNIT(S): 50 INJECTION, SOLUTION INTRAVENOUS at 22:09

## 2024-07-04 RX ADMIN — LABETALOL HYDROCHLORIDE 100 MILLIGRAM(S): 300 TABLET ORAL at 22:08

## 2024-07-04 RX ADMIN — INSULIN LISPRO 10: 100 INJECTION, SOLUTION SUBCUTANEOUS at 12:01

## 2024-07-04 RX ADMIN — LEVETIRACETAM 1000 MILLIGRAM(S): 100 INJECTION INTRAVENOUS at 18:19

## 2024-07-04 RX ADMIN — HYDRALAZINE HYDROCHLORIDE 25 MILLIGRAM(S): 50 TABLET ORAL at 13:12

## 2024-07-04 RX ADMIN — MUPIROCIN 1 APPLICATION(S): 20 OINTMENT TOPICAL at 18:19

## 2024-07-04 RX ADMIN — Medication 2 PUFF(S): at 12:36

## 2024-07-04 RX ADMIN — IPRATROPIUM BROMIDE AND ALBUTEROL SULFATE 3 MILLILITER(S): .5; 3 SOLUTION RESPIRATORY (INHALATION) at 12:37

## 2024-07-04 RX ADMIN — MONTELUKAST SODIUM 10 MILLIGRAM(S): 10 TABLET, FILM COATED ORAL at 11:21

## 2024-07-04 RX ADMIN — INSULIN LISPRO 2: 100 INJECTION, SOLUTION SUBCUTANEOUS at 16:32

## 2024-07-04 RX ADMIN — HEPARIN SODIUM 5000 UNIT(S): 50 INJECTION, SOLUTION INTRAVENOUS at 13:13

## 2024-07-04 RX ADMIN — Medication 100 MILLIGRAM(S): at 22:08

## 2024-07-04 RX ADMIN — LEVETIRACETAM 1000 MILLIGRAM(S): 100 INJECTION INTRAVENOUS at 05:01

## 2024-07-04 RX ADMIN — Medication 1 TABLET(S): at 11:21

## 2024-07-04 RX ADMIN — HEPARIN SODIUM 5000 UNIT(S): 50 INJECTION, SOLUTION INTRAVENOUS at 05:01

## 2024-07-04 RX ADMIN — MUPIROCIN 1 APPLICATION(S): 20 OINTMENT TOPICAL at 05:01

## 2024-07-04 RX ADMIN — LABETALOL HYDROCHLORIDE 100 MILLIGRAM(S): 300 TABLET ORAL at 13:12

## 2024-07-04 RX ADMIN — POLYETHYLENE GLYCOL 3350 17 GRAM(S): 1 POWDER ORAL at 11:22

## 2024-07-04 RX ADMIN — PANTOPRAZOLE SODIUM 40 MILLIGRAM(S): 40 INJECTION, POWDER, FOR SOLUTION INTRAVENOUS at 06:39

## 2024-07-04 RX ADMIN — HYDRALAZINE HYDROCHLORIDE 25 MILLIGRAM(S): 50 TABLET ORAL at 22:08

## 2024-07-04 RX ADMIN — ATORVASTATIN CALCIUM 20 MILLIGRAM(S): 20 TABLET, FILM COATED ORAL at 22:08

## 2024-07-04 NOTE — PHYSICAL THERAPY INITIAL EVALUATION ADULT - PLANNED THERAPY INTERVENTIONS, PT EVAL
stair training: GOAL: Pt will negotiate up/down 1 flight of steps with 1 handrail ascending independently in 2 weeks./balance training/gait training/strengthening/transfer training

## 2024-07-04 NOTE — PROGRESS NOTE ADULT - ASSESSMENT
Ms. Bloom is a 73 year old woman with multiple medical comorbidities and history of Type A aortic dissection with Bentall procedure presenting with concern for Type B dissection on imaging from outside hospital.    Recommendations:  - CT surgey consulted given extensive past cardiac surgical history  - following closely, further management will be determined   -All other management per CTICU team       Raymond Lopez, PGY-1  Vascular Surgery [id number]   Ms. Bloom is a 73 year old woman with multiple medical comorbidities and history of Type A aortic dissection with Bentall procedure presenting with chest pain from outside hospital with concern for Type B dissection    Recommendations:  - CT surgery consulted given extensive past cardiac surgical history  - following closely, further management will be determined   -All other management per CTICU team       Raymond Lopez, PGY-1  Vascular Surgery [id number]   Ms. Bloom is a 73 year old woman with multiple medical comorbidities and history of Type A aortic dissection with Bentall procedure presenting with chest pain from outside hospital with concern for Type B dissection    Recommendations:  - Continue blood pressure management   - Vascular team following   - Management per CTICU team       Vascular Surgery [id number]

## 2024-07-04 NOTE — PROGRESS NOTE ADULT - SUBJECTIVE AND OBJECTIVE BOX
Patient seen and examined at the bedside.    Remained critically ill on continuous ICU monitoring.    OBJECTIVE:  Vital Signs Last 24 Hrs  T(C): 37.1 (04 Jul 2024 16:00), Max: 37.1 (04 Jul 2024 04:00)  T(F): 98.8 (04 Jul 2024 16:00), Max: 98.8 (04 Jul 2024 04:00)  HR: 58 (04 Jul 2024 19:15) (52 - 85)  BP: --  BP(mean): --  RR: 20 (04 Jul 2024 19:15) (14 - 50)  SpO2: 91% (04 Jul 2024 19:15) (87% - 99%)    Parameters below as of 04 Jul 2024 17:38  Patient On (Oxygen Delivery Method): room air      Physical Exam:  General: NAD  Neurology: nonfocal   Eyes: bilaeral pupils equal and reactive   ENT/Neck: +ETT midline, Neck supple, trachea midline, No JVD   Respiratory: Rales noted bilaterally   CV: RRR, S1S2, no murmurs        [x] Sinus cade  Abdominal: Soft, NT, ND +BS,   Extremities: 1-2+ pedal edema noted, + peripheral pulses   Skin: No Rashes, Hematoma, Ecchymosis                           Assessment:    Plan:   ***Neuro***  [x] Nonfocal  Pain management with Gabapentin  Keppra for seizure hx   Post operative neuro assessment     ***Cardiovascular***  Labile hemodynamics large volume resuscitation dual pressor + inotrope  Invasive hemodynamic monitoring, assess perfusion indices   SB 55-60/ MAP 65-75/ SvO2 ___ / Hct 36.0 / Lactate 1.1   [x] Mexiletine 200mg Q8hrs for rate control   Reassessment of hemodynamics post resuscitation   Monitor chest tube outputs   [x] Nonbleeding __  [x] Hydralazine 50mg Q8hrs and Labetalol 100mg Q 8hrs   [x] Heparin for DVT prophylaxis   [x] Statin   [x] K> 4 Mg >2   Serial EKG and cardiac enzymes     ***Pulmonary***  [x] Room Air  Encourage incentive spirometry, continue pulse ox monitoring, follow ABGs     ***GI***  CC Diet   [x] Protonix   Bowel regimen with Miralax and Senna     ***Renal***  GFR 91  Continue to monitor I/Os, BUN/Creatinine.   Replete lytes PRN  Amezcua present [x] positive     ***ID***  Solu-medrol for immunosuppression     ***Endocrine***  [x] Stress Hyperglycemia : HbA1c 7.8%             - [x] Insulin gtt               - Need tight glycemic control to prevent wound infection.          Patient requires continuous monitoring with bedside rhythm monitoring, pulse oximetry monitoring, and continuous central venous and arterial pressure monitoring; and intermittent blood gas analysis. Care plan discussed with the ICU care team.   Patient remained critical, at risk for life threatening decompensation.    I have spent 75 minutes providing critical care management to this patient.    By signing my name below, I, Mary Jo Hewitt, attest that this documentation has been prepared under the direction and in the presence of Francine Reynoso MD   Electronically signed: Georgi Gabriel, 07-04-24 @ 19:30    I, Francine Reynoso, personally performed the services described in this documentation. all medical record entries made by the scribe were at my direction and in my presence. I have reviewed the chart and agree that the record reflects my personal performance and is accurate and complete  Electronically signed: Francine Reynoso MD

## 2024-07-04 NOTE — PROGRESS NOTE ADULT - SUBJECTIVE AND OBJECTIVE BOX
CRITICAL CARE ATTENDING - CTICU    MEDICATIONS  (STANDING):  albuterol/ipratropium for Nebulization 3 milliLiter(s) Nebulizer every 6 hours  ascorbic acid 500 milliGRAM(s) Oral daily  atorvastatin 20 milliGRAM(s) Oral at bedtime  budesonide 160 MICROgram(s)/formoterol 4.5 MICROgram(s) Inhaler 2 Puff(s) Inhalation daily  gabapentin 100 milliGRAM(s) Oral every 8 hours  heparin   Injectable 5000 Unit(s) SubCutaneous every 8 hours  hydrALAZINE 25 milliGRAM(s) Oral every 8 hours  labetalol 100 milliGRAM(s) Oral every 8 hours  levETIRAcetam 1000 milliGRAM(s) Oral two times a day  methylPREDNISolone 12 milliGRAM(s) Oral daily  mexiletine 200 milliGRAM(s) Oral every 8 hours  montelukast 10 milliGRAM(s) Oral daily  multivitamin 1 Tablet(s) Oral daily  mupirocin 2% Nasal 1 Application(s) Both Nostrils every 12 hours  pantoprazole    Tablet 40 milliGRAM(s) Oral before breakfast  polyethylene glycol 3350 17 Gram(s) Oral daily  senna 2 Tablet(s) Oral at bedtime  sodium chloride 0.9% lock flush 3 milliLiter(s) IV Push every 8 hours  sodium chloride 3% Bolus 150 milliLiter(s) IV Bolus once  tiotropium 2.5 MICROgram(s) Inhaler 2 Puff(s) Inhalation daily                                    10.6   14.95 )-----------( 217      ( 2024 01:05 )             36.0       07-04    142  |  105  |  26<H>  ----------------------------<  115<H>  4.1   |  23  |  0.66    Ca    9.0      2024 01:05  Phos  3.5     07-04  Mg     2.3     07-04    TPro  6.3  /  Alb  3.3  /  TBili  0.2  /  DBili  x   /  AST  11  /  ALT  14  /  AlkPhos  79  07-04      PT/INR - ( 2024 12:58 )   PT: 11.4 sec;   INR: 1.04 ratio         PTT - ( 2024 12:58 )  PTT:30.9 sec        Daily     Daily Weight in k.5 (2024 00:00)      -03 @ 07:01  -  - @ 07:00  --------------------------------------------------------  IN: 432.5 mL / OUT: 500 mL / NET: -67.5 mL        Critically Ill patient  : [ ] preoperative ,   [x ] post operative (Re-admit)    Requires :  [x ] Arterial Line   [ ] Central Line  [ ] PA catheter  [ ] IABP  [ ] ECMO  [ ] LVAD  [ ] Ventilator  [ ] pacemaker [ ] Impella.                      [x ] ABG's     [x ] Pulse Oxymetry Monitoring  Bedside evaluation , monitoring , treatment of hemodynamics , fluids , IVP/ IVCD meds.        Diagnosis:     POD 1 : Admitted for chest pressure, known Type B Dissection    22 Type A dissection repair, Modified bentall and hemiarch    Hypotension a/w Hypertension, requiring intravenous medication.     Hypovolemia      Respiratory Failure       Requires chest PT, pulmonary toilet, suctioning to maintain SaO2,  patent airway and treat atelectasis.     Difficult weaning process - multiple organ system involvement in critically ill patient     For Tracheostomy     CHF- acute [ ]   chronic [ x]    systolic [ ]   diastolic [ ]  Valvular [ ]          - Echo- EF - 75%             [ ] RV dysfunction          - Cxr-cardiomegally, edema          - Clinical-  [ ]inotropes   [ ]pressors   [ ]diuresis   [ ]IABP   [ ]ECMO   [ ]LVAD   [ x]Respiratory Failure        -     Ventilator Management:  [x]AC-rest -   PC    [ x]CPAP-PS Wean   [ ]Trach Collar     [ ]Extubate    [ ] T-Piece  [x ]peep>5    Hemodynamic lability,  instability. Requires IVCD [ x] vasopressors on/off [ ]  inotropes  [ x] vasodilator  [x ]IVSS fluid  to maintain MAP, perfusion, C.I.      DM- IVCD Insulin      Vent synchrony - IVCD Precedex       COPD     Colostomy - colon CA     Renal Failure - Acute Kidney Injury     ? 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 All medical record entries made by the scribe were at my direction and in my presence. I have reviewed the chart and agree that the record reflects my personal performance and is accurate and complete.   Bon Jiménez MD.       By signing my name below, I, Jorge Vasquez, attest that this documentation has been prepared under the direction and in the presence of Bon Jiménez MD.   Electronically Signed: Georgi Regalado 24 @ 10:01        Discussed with CT surgeon, Physician Assistant - Nurse Practitioner- Critical care medicine team.   Dicussed at  AM / PM rounds.   Chart, labs , films reviewed.    Cumulative Critical Care Time Given Today:  CRITICAL CARE ATTENDING - CTICU    MEDICATIONS  (STANDING):  albuterol/ipratropium for Nebulization 3 milliLiter(s) Nebulizer every 6 hours  ascorbic acid 500 milliGRAM(s) Oral daily  atorvastatin 20 milliGRAM(s) Oral at bedtime  budesonide 160 MICROgram(s)/formoterol 4.5 MICROgram(s) Inhaler 2 Puff(s) Inhalation daily  gabapentin 100 milliGRAM(s) Oral every 8 hours  heparin   Injectable 5000 Unit(s) SubCutaneous every 8 hours  hydrALAZINE 25 milliGRAM(s) Oral every 8 hours  labetalol 100 milliGRAM(s) Oral every 8 hours  levETIRAcetam 1000 milliGRAM(s) Oral two times a day  methylPREDNISolone 12 milliGRAM(s) Oral daily  mexiletine 200 milliGRAM(s) Oral every 8 hours  montelukast 10 milliGRAM(s) Oral daily  multivitamin 1 Tablet(s) Oral daily  mupirocin 2% Nasal 1 Application(s) Both Nostrils every 12 hours  pantoprazole    Tablet 40 milliGRAM(s) Oral before breakfast  polyethylene glycol 3350 17 Gram(s) Oral daily  senna 2 Tablet(s) Oral at bedtime  sodium chloride 0.9% lock flush 3 milliLiter(s) IV Push every 8 hours  sodium chloride 3% Bolus 150 milliLiter(s) IV Bolus once  tiotropium 2.5 MICROgram(s) Inhaler 2 Puff(s) Inhalation daily                                    10.6   14.95 )-----------( 217      ( 2024 01:05 )             36.0       07-04    142  |  105  |  26<H>  ----------------------------<  115<H>  4.1   |  23  |  0.66    Ca    9.0      2024 01:05  Phos  3.5     07-04  Mg     2.3     07-04    TPro  6.3  /  Alb  3.3  /  TBili  0.2  /  DBili  x   /  AST  11  /  ALT  14  /  AlkPhos  79  07-04      PT/INR - ( 2024 12:58 )   PT: 11.4 sec;   INR: 1.04 ratio         PTT - ( 2024 12:58 )  PTT:30.9 sec        Daily     Daily Weight in k.5 (2024 00:00)       @ 07:01  -   @ 07:00  --------------------------------------------------------  IN: 432.5 mL / OUT: 500 mL / NET: -67.5 mL        Critically Ill patient  : [ ] preoperative ,   [x ] post operative (Re-admit)    Requires :  [x ] Arterial Line   [ ] Central Line  [ ] PA catheter  [ ] IABP  [ ] ECMO  [ ] LVAD  [ ] Ventilator  [ ] pacemaker [ ] Impella.                      [x ] ABG's     [x ] Pulse Oxymetry Monitoring  Bedside evaluation , monitoring , treatment of hemodynamics , fluids , IVP/ IVCD meds.        Diagnosis:     POD 1 : Admitted for chest pressure, known Type B Dissection    22 Type A dissection repair, Modified bentall and hemiarch    Hypotension a/w Hypertension, requiring intravenous medication.     Hypovolemia     Hemodynamic lability,  instability. Requires IVCD / IVP / PO   [x ]  Beta Blockers [ x] vasodilators   [x ]IVSS fluid  to maintain MAP, perfusion, C.I.       Conversion to PO medications     COPD     Colostomy - colon CA     Prerenal Azotemia     Requires bedside physical therapy, mobilization and total senior care care     f/u CT - C/A/P                I, Bon Jiménez, personally performed the services described in this documentation. All medical record entries made by the scribe were at my direction and in my presence. I have reviewed the chart and agree that the record reflects my personal performance and is accurate and complete.   Bon Jiménez MD.       By signing my name below, I, Jorge Vasquez, attest that this documentation has been prepared under the direction and in the presence of Bon Jiménez MD.   Electronically Signed: Georgi Regalado 24 @ 10:01        Discussed with CT surgeon, Physician Assistant - Nurse Practitioner- Critical care medicine team.   Dicussed at  AM / PM rounds.   Chart, labs , films reviewed.    Cumulative Critical Care Time Given Today:  30 min

## 2024-07-04 NOTE — PHYSICAL THERAPY INITIAL EVALUATION ADULT - ADDITIONAL COMMENTS
Pt reports that she lives in a pvt house with her dtr +4 steps to enter and +14 steps once inside. Pt states that she ambulates independently with no AD and utilizing furniture for assistance. Pt has a rollator for community ambulation however prefers to ambulate while holding onto her dtr per report. Pt owns a shower chair for DME. Pt states that she is in the process of moving to a house with no steps to negotiate and having a HHA for 24/7 as well but it is not yet set up. Pt also was going to begin OPT prior to admission.

## 2024-07-04 NOTE — PROGRESS NOTE ADULT - SUBJECTIVE AND OBJECTIVE BOX
SURGERY DAILY PROGRESS NOTE    24 Hour/Overnight Events: No acute events overnight    SUBJECTIVE: Patient seen and evaluated on AM rounds. Denies fevers/chills, chest pain, dyspnea, abdominal pain, nausea, vomiting, and diarrhea    ------------------------------------------------------------------------------------------------------------  OBJECTIVE:  Vital Signs Last 24 Hrs  T(C): 36.6 (04 Jul 2024 12:00), Max: 37.1 (04 Jul 2024 04:00)  T(F): 97.9 (04 Jul 2024 12:00), Max: 98.8 (04 Jul 2024 04:00)  HR: 61 (04 Jul 2024 12:00) (52 - 68)  BP: 120/56 (03 Jul 2024 18:00) (103/51 - 120/56)  BP(mean): 80 (03 Jul 2024 18:00) (73 - 80)  RR: 20 (04 Jul 2024 12:00) (14 - 50)  SpO2: 95% (04 Jul 2024 12:00) (87% - 99%)    Parameters below as of 04 Jul 2024 12:00  Patient On (Oxygen Delivery Method): room air      I&O's Detail    03 Jul 2024 07:01  -  04 Jul 2024 07:00  --------------------------------------------------------  IN:    NiCARdipine: 192.5 mL    Oral Fluid: 240 mL  Total IN: 432.5 mL    OUT:    Esmolol: 0 mL    Labetalol: 0 mL    Voided (mL): 500 mL  Total OUT: 500 mL    Total NET: -67.5 mL      04 Jul 2024 07:01  -  04 Jul 2024 13:08  --------------------------------------------------------  IN:    NiCARdipine: 37.5 mL    Oral Fluid: 300 mL  Total IN: 337.5 mL    OUT:    Voided (mL): 200 mL  Total OUT: 200 mL    Total NET: 137.5 mL          LABS:                        10.6   14.95 )-----------( 217      ( 04 Jul 2024 01:05 )             36.0     07-04    142  |  105  |  26<H>  ----------------------------<  115<H>  4.1   |  23  |  0.66    Ca    9.0      04 Jul 2024 01:05  Phos  3.5     07-04  Mg     2.3     07-04    TPro  6.3  /  Alb  3.3  /  TBili  0.2  /  DBili  x   /  AST  11  /  ALT  14  /  AlkPhos  79  07-04    LIVER FUNCTIONS - ( 04 Jul 2024 01:05 )  Alb: 3.3 g/dL / Pro: 6.3 g/dL / ALK PHOS: 79 U/L / ALT: 14 U/L / AST: 11 U/L / GGT: x           PT/INR - ( 03 Jul 2024 12:58 )   PT: 11.4 sec;   INR: 1.04 ratio         PTT - ( 03 Jul 2024 12:58 )  PTT:30.9 sec  Urinalysis Basic - ( 04 Jul 2024 01:05 )    Color: x / Appearance: x / SG: x / pH: x  Gluc: 115 mg/dL / Ketone: x  / Bili: x / Urobili: x   Blood: x / Protein: x / Nitrite: x   Leuk Esterase: x / RBC: x / WBC x   Sq Epi: x / Non Sq Epi: x / Bacteria: x        HYSICAL EXAMINATION:  General - well-nourished, no acute distress  Neuro - awake, alert, oriented x4, no acute focal deficits  Lungs - breathing comfortably on nasal cannula   Heart - pulse normal   Abdomen - soft, nontender, nondistended  Extremities - all four extremities are warm, strength and sensation intact in bilateral upper and lower extremities  Pulse exam:  - bilateral carotid pulses palpable  - bilateral axial, brachial, radial, and ulnar pulses palpable  - bilateral femoral, popliteal, and DPs palpable  - bilateral PT signals present     SURGERY DAILY PROGRESS NOTE    24 Hour/Overnight Events: No acute events overnight    SUBJECTIVE: Patient seen and evaluated on AM rounds.    ------------------------------------------------------------------------------------------------------------  OBJECTIVE:  Vital Signs Last 24 Hrs  T(C): 36.6 (04 Jul 2024 12:00), Max: 37.1 (04 Jul 2024 04:00)  T(F): 97.9 (04 Jul 2024 12:00), Max: 98.8 (04 Jul 2024 04:00)  HR: 61 (04 Jul 2024 12:00) (52 - 68)  BP: 120/56 (03 Jul 2024 18:00) (103/51 - 120/56)  BP(mean): 80 (03 Jul 2024 18:00) (73 - 80)  RR: 20 (04 Jul 2024 12:00) (14 - 50)  SpO2: 95% (04 Jul 2024 12:00) (87% - 99%)    Parameters below as of 04 Jul 2024 12:00  Patient On (Oxygen Delivery Method): room air      I&O's Detail    03 Jul 2024 07:01  -  04 Jul 2024 07:00  --------------------------------------------------------  IN:    NiCARdipine: 192.5 mL    Oral Fluid: 240 mL  Total IN: 432.5 mL    OUT:    Esmolol: 0 mL    Labetalol: 0 mL    Voided (mL): 500 mL  Total OUT: 500 mL    Total NET: -67.5 mL      04 Jul 2024 07:01  -  04 Jul 2024 13:08  --------------------------------------------------------  IN:    NiCARdipine: 37.5 mL    Oral Fluid: 300 mL  Total IN: 337.5 mL    OUT:    Voided (mL): 200 mL  Total OUT: 200 mL    Total NET: 137.5 mL          LABS:                        10.6   14.95 )-----------( 217      ( 04 Jul 2024 01:05 )             36.0     07-04    142  |  105  |  26<H>  ----------------------------<  115<H>  4.1   |  23  |  0.66    Ca    9.0      04 Jul 2024 01:05  Phos  3.5     07-04  Mg     2.3     07-04    TPro  6.3  /  Alb  3.3  /  TBili  0.2  /  DBili  x   /  AST  11  /  ALT  14  /  AlkPhos  79  07-04    LIVER FUNCTIONS - ( 04 Jul 2024 01:05 )  Alb: 3.3 g/dL / Pro: 6.3 g/dL / ALK PHOS: 79 U/L / ALT: 14 U/L / AST: 11 U/L / GGT: x           PT/INR - ( 03 Jul 2024 12:58 )   PT: 11.4 sec;   INR: 1.04 ratio         PTT - ( 03 Jul 2024 12:58 )  PTT:30.9 sec  Urinalysis Basic - ( 04 Jul 2024 01:05 )    Color: x / Appearance: x / SG: x / pH: x  Gluc: 115 mg/dL / Ketone: x  / Bili: x / Urobili: x   Blood: x / Protein: x / Nitrite: x   Leuk Esterase: x / RBC: x / WBC x   Sq Epi: x / Non Sq Epi: x / Bacteria: x        HYSICAL EXAMINATION:  General - well-nourished, no acute distress  Neuro - awake, alert, oriented x4, no acute focal deficits  Lungs - breathing comfortably on nasal cannula   Heart - pulse normal   Abdomen - soft, nontender, nondistended  Extremities - all four extremities are warm, strength and sensation intact in bilateral upper and lower extremities  Pulse exam:  - bilateral carotid pulses palpable  - bilateral axial, brachial, radial, and ulnar pulses palpable  - bilateral femoral, popliteal, and DPs palpable  - bilateral PT signals present

## 2024-07-05 ENCOUNTER — NON-APPOINTMENT (OUTPATIENT)
Age: 74
End: 2024-07-05

## 2024-07-05 ENCOUNTER — APPOINTMENT (OUTPATIENT)
Dept: PULMONOLOGY | Facility: CLINIC | Age: 74
End: 2024-07-05

## 2024-07-05 DIAGNOSIS — E11.9 TYPE 2 DIABETES MELLITUS WITHOUT COMPLICATIONS: ICD-10-CM

## 2024-07-05 LAB
ALBUMIN SERPL ELPH-MCNC: 3.3 G/DL — SIGNIFICANT CHANGE UP (ref 3.3–5)
ALP SERPL-CCNC: 79 U/L — SIGNIFICANT CHANGE UP (ref 40–120)
ALT FLD-CCNC: 13 U/L — SIGNIFICANT CHANGE UP (ref 10–45)
ANION GAP SERPL CALC-SCNC: 13 MMOL/L — SIGNIFICANT CHANGE UP (ref 5–17)
AST SERPL-CCNC: 12 U/L — SIGNIFICANT CHANGE UP (ref 10–40)
BASOPHILS # BLD AUTO: 0.01 K/UL — SIGNIFICANT CHANGE UP (ref 0–0.2)
BASOPHILS NFR BLD AUTO: 0.1 % — SIGNIFICANT CHANGE UP (ref 0–2)
BILIRUB SERPL-MCNC: 0.2 MG/DL — SIGNIFICANT CHANGE UP (ref 0.2–1.2)
BUN SERPL-MCNC: 30 MG/DL — HIGH (ref 7–23)
CALCIUM SERPL-MCNC: 9 MG/DL — SIGNIFICANT CHANGE UP (ref 8.4–10.5)
CHLORIDE SERPL-SCNC: 102 MMOL/L — SIGNIFICANT CHANGE UP (ref 96–108)
CO2 SERPL-SCNC: 22 MMOL/L — SIGNIFICANT CHANGE UP (ref 22–31)
CREAT SERPL-MCNC: 0.71 MG/DL — SIGNIFICANT CHANGE UP (ref 0.5–1.3)
EGFR: 89 ML/MIN/1.73M2 — SIGNIFICANT CHANGE UP
EOSINOPHIL # BLD AUTO: 0 K/UL — SIGNIFICANT CHANGE UP (ref 0–0.5)
EOSINOPHIL NFR BLD AUTO: 0 % — SIGNIFICANT CHANGE UP (ref 0–6)
GAS PNL BLDA: SIGNIFICANT CHANGE UP
GLUCOSE BLDC GLUCOMTR-MCNC: 102 MG/DL — HIGH (ref 70–99)
GLUCOSE BLDC GLUCOMTR-MCNC: 105 MG/DL — HIGH (ref 70–99)
GLUCOSE BLDC GLUCOMTR-MCNC: 116 MG/DL — HIGH (ref 70–99)
GLUCOSE BLDC GLUCOMTR-MCNC: 124 MG/DL — HIGH (ref 70–99)
GLUCOSE BLDC GLUCOMTR-MCNC: 127 MG/DL — HIGH (ref 70–99)
GLUCOSE BLDC GLUCOMTR-MCNC: 159 MG/DL — HIGH (ref 70–99)
GLUCOSE BLDC GLUCOMTR-MCNC: 169 MG/DL — HIGH (ref 70–99)
GLUCOSE BLDC GLUCOMTR-MCNC: 197 MG/DL — HIGH (ref 70–99)
GLUCOSE BLDC GLUCOMTR-MCNC: 204 MG/DL — HIGH (ref 70–99)
GLUCOSE BLDC GLUCOMTR-MCNC: 235 MG/DL — HIGH (ref 70–99)
GLUCOSE BLDC GLUCOMTR-MCNC: 241 MG/DL — HIGH (ref 70–99)
GLUCOSE BLDC GLUCOMTR-MCNC: 258 MG/DL — HIGH (ref 70–99)
GLUCOSE BLDC GLUCOMTR-MCNC: 292 MG/DL — HIGH (ref 70–99)
GLUCOSE BLDC GLUCOMTR-MCNC: 313 MG/DL — HIGH (ref 70–99)
GLUCOSE BLDC GLUCOMTR-MCNC: 326 MG/DL — HIGH (ref 70–99)
GLUCOSE BLDC GLUCOMTR-MCNC: 72 MG/DL — SIGNIFICANT CHANGE UP (ref 70–99)
GLUCOSE BLDC GLUCOMTR-MCNC: 97 MG/DL — SIGNIFICANT CHANGE UP (ref 70–99)
GLUCOSE SERPL-MCNC: 330 MG/DL — HIGH (ref 70–99)
HCT VFR BLD CALC: 34.6 % — SIGNIFICANT CHANGE UP (ref 34.5–45)
HGB BLD-MCNC: 10.6 G/DL — LOW (ref 11.5–15.5)
IMM GRANULOCYTES NFR BLD AUTO: 0.8 % — SIGNIFICANT CHANGE UP (ref 0–0.9)
LYMPHOCYTES # BLD AUTO: 1.06 K/UL — SIGNIFICANT CHANGE UP (ref 1–3.3)
LYMPHOCYTES # BLD AUTO: 9.2 % — LOW (ref 13–44)
MAGNESIUM SERPL-MCNC: 2.1 MG/DL — SIGNIFICANT CHANGE UP (ref 1.6–2.6)
MCHC RBC-ENTMCNC: 20.8 PG — LOW (ref 27–34)
MCHC RBC-ENTMCNC: 30.6 GM/DL — LOW (ref 32–36)
MCV RBC AUTO: 68 FL — LOW (ref 80–100)
MONOCYTES # BLD AUTO: 0.72 K/UL — SIGNIFICANT CHANGE UP (ref 0–0.9)
MONOCYTES NFR BLD AUTO: 6.3 % — SIGNIFICANT CHANGE UP (ref 2–14)
NEUTROPHILS # BLD AUTO: 9.63 K/UL — HIGH (ref 1.8–7.4)
NEUTROPHILS NFR BLD AUTO: 83.6 % — HIGH (ref 43–77)
NRBC # BLD: 0 /100 WBCS — SIGNIFICANT CHANGE UP (ref 0–0)
PHOSPHATE SERPL-MCNC: 2.8 MG/DL — SIGNIFICANT CHANGE UP (ref 2.5–4.5)
PLATELET # BLD AUTO: 198 K/UL — SIGNIFICANT CHANGE UP (ref 150–400)
POTASSIUM SERPL-MCNC: 4.7 MMOL/L — SIGNIFICANT CHANGE UP (ref 3.5–5.3)
POTASSIUM SERPL-SCNC: 4.7 MMOL/L — SIGNIFICANT CHANGE UP (ref 3.5–5.3)
PROT SERPL-MCNC: 6.1 G/DL — SIGNIFICANT CHANGE UP (ref 6–8.3)
RBC # BLD: 5.09 M/UL — SIGNIFICANT CHANGE UP (ref 3.8–5.2)
RBC # FLD: 22.9 % — HIGH (ref 10.3–14.5)
SODIUM SERPL-SCNC: 137 MMOL/L — SIGNIFICANT CHANGE UP (ref 135–145)
WBC # BLD: 11.51 K/UL — HIGH (ref 3.8–10.5)
WBC # FLD AUTO: 11.51 K/UL — HIGH (ref 3.8–10.5)

## 2024-07-05 PROCEDURE — 71045 X-RAY EXAM CHEST 1 VIEW: CPT | Mod: 26

## 2024-07-05 PROCEDURE — 99291 CRITICAL CARE FIRST HOUR: CPT

## 2024-07-05 RX ORDER — INSULIN LISPRO 100 [IU]/ML
INJECTION, SOLUTION SUBCUTANEOUS
Refills: 0 | Status: DISCONTINUED | OUTPATIENT
Start: 2024-07-05 | End: 2024-07-08

## 2024-07-05 RX ORDER — INSULIN GLARGINE 100 [IU]/ML
20 INJECTION, SOLUTION SUBCUTANEOUS AT BEDTIME
Refills: 0 | Status: DISCONTINUED | OUTPATIENT
Start: 2024-07-06 | End: 2024-07-06

## 2024-07-05 RX ORDER — DEXTROSE 30 % IN WATER 30 %
50 VIAL (ML) INTRAVENOUS
Refills: 0 | Status: DISCONTINUED | OUTPATIENT
Start: 2024-07-05 | End: 2024-07-08

## 2024-07-05 RX ORDER — ACETAMINOPHEN 325 MG
650 TABLET ORAL ONCE
Refills: 0 | Status: COMPLETED | OUTPATIENT
Start: 2024-07-05 | End: 2024-07-05

## 2024-07-05 RX ORDER — INSULIN GLARGINE 100 [IU]/ML
20 INJECTION, SOLUTION SUBCUTANEOUS ONCE
Refills: 0 | Status: COMPLETED | OUTPATIENT
Start: 2024-07-05 | End: 2024-07-05

## 2024-07-05 RX ORDER — INSULIN LISPRO 100 [IU]/ML
8 INJECTION, SOLUTION SUBCUTANEOUS
Refills: 0 | Status: DISCONTINUED | OUTPATIENT
Start: 2024-07-05 | End: 2024-07-06

## 2024-07-05 RX ORDER — INSULIN REGULAR, HUMAN 100/ML
3 VIAL (ML) INJECTION
Qty: 100 | Refills: 0 | Status: DISCONTINUED | OUTPATIENT
Start: 2024-07-05 | End: 2024-07-05

## 2024-07-05 RX ADMIN — HEPARIN SODIUM 5000 UNIT(S): 50 INJECTION, SOLUTION INTRAVENOUS at 13:41

## 2024-07-05 RX ADMIN — Medication 2 TABLET(S): at 22:11

## 2024-07-05 RX ADMIN — HYDRALAZINE HYDROCHLORIDE 25 MILLIGRAM(S): 50 TABLET ORAL at 13:40

## 2024-07-05 RX ADMIN — IPRATROPIUM BROMIDE AND ALBUTEROL SULFATE 3 MILLILITER(S): .5; 3 SOLUTION RESPIRATORY (INHALATION) at 23:21

## 2024-07-05 RX ADMIN — INSULIN GLARGINE 20 UNIT(S): 100 INJECTION, SOLUTION SUBCUTANEOUS at 16:23

## 2024-07-05 RX ADMIN — Medication 100 MILLIGRAM(S): at 13:41

## 2024-07-05 RX ADMIN — POLYETHYLENE GLYCOL 3350 17 GRAM(S): 1 POWDER ORAL at 11:53

## 2024-07-05 RX ADMIN — MEXILETINE HYDROCHLORIDE 200 MILLIGRAM(S): 250 CAPSULE ORAL at 05:39

## 2024-07-05 RX ADMIN — LEVETIRACETAM 1000 MILLIGRAM(S): 100 INJECTION INTRAVENOUS at 17:53

## 2024-07-05 RX ADMIN — Medication 100 MILLIGRAM(S): at 22:11

## 2024-07-05 RX ADMIN — Medication 650 MILLIGRAM(S): at 15:00

## 2024-07-05 RX ADMIN — LABETALOL HYDROCHLORIDE 100 MILLIGRAM(S): 300 TABLET ORAL at 22:12

## 2024-07-05 RX ADMIN — HEPARIN SODIUM 5000 UNIT(S): 50 INJECTION, SOLUTION INTRAVENOUS at 22:12

## 2024-07-05 RX ADMIN — HYDRALAZINE HYDROCHLORIDE 25 MILLIGRAM(S): 50 TABLET ORAL at 05:39

## 2024-07-05 RX ADMIN — LEVETIRACETAM 1000 MILLIGRAM(S): 100 INJECTION INTRAVENOUS at 05:39

## 2024-07-05 RX ADMIN — MUPIROCIN 1 APPLICATION(S): 20 OINTMENT TOPICAL at 17:56

## 2024-07-05 RX ADMIN — TIOTROPIUM BROMIDE 2 PUFF(S): 18 CAPSULE ORAL; RESPIRATORY (INHALATION) at 14:16

## 2024-07-05 RX ADMIN — HEPARIN SODIUM 5000 UNIT(S): 50 INJECTION, SOLUTION INTRAVENOUS at 05:38

## 2024-07-05 RX ADMIN — Medication 3 MILLILITER(S): at 13:44

## 2024-07-05 RX ADMIN — IPRATROPIUM BROMIDE AND ALBUTEROL SULFATE 3 MILLILITER(S): .5; 3 SOLUTION RESPIRATORY (INHALATION) at 11:29

## 2024-07-05 RX ADMIN — IPRATROPIUM BROMIDE AND ALBUTEROL SULFATE 3 MILLILITER(S): .5; 3 SOLUTION RESPIRATORY (INHALATION) at 05:08

## 2024-07-05 RX ADMIN — INSULIN LISPRO 2: 100 INJECTION, SOLUTION SUBCUTANEOUS at 08:27

## 2024-07-05 RX ADMIN — Medication 2 PUFF(S): at 11:32

## 2024-07-05 RX ADMIN — Medication 3 MILLILITER(S): at 05:00

## 2024-07-05 RX ADMIN — Medication 1 TABLET(S): at 11:53

## 2024-07-05 RX ADMIN — LABETALOL HYDROCHLORIDE 100 MILLIGRAM(S): 300 TABLET ORAL at 13:40

## 2024-07-05 RX ADMIN — Medication 12 MILLIGRAM(S): at 05:39

## 2024-07-05 RX ADMIN — ATORVASTATIN CALCIUM 20 MILLIGRAM(S): 20 TABLET, FILM COATED ORAL at 22:11

## 2024-07-05 RX ADMIN — PANTOPRAZOLE SODIUM 40 MILLIGRAM(S): 40 INJECTION, POWDER, FOR SOLUTION INTRAVENOUS at 06:31

## 2024-07-05 RX ADMIN — MONTELUKAST SODIUM 10 MILLIGRAM(S): 10 TABLET, FILM COATED ORAL at 11:53

## 2024-07-05 RX ADMIN — MUPIROCIN 1 APPLICATION(S): 20 OINTMENT TOPICAL at 05:40

## 2024-07-05 RX ADMIN — INSULIN LISPRO 8 UNIT(S): 100 INJECTION, SOLUTION SUBCUTANEOUS at 16:23

## 2024-07-05 RX ADMIN — Medication 3 MILLILITER(S): at 22:57

## 2024-07-05 RX ADMIN — Medication 3 UNIT(S)/HR: at 12:04

## 2024-07-05 RX ADMIN — Medication 650 MILLIGRAM(S): at 14:00

## 2024-07-05 RX ADMIN — MEXILETINE HYDROCHLORIDE 200 MILLIGRAM(S): 250 CAPSULE ORAL at 22:11

## 2024-07-05 RX ADMIN — HYDRALAZINE HYDROCHLORIDE 25 MILLIGRAM(S): 50 TABLET ORAL at 20:57

## 2024-07-05 RX ADMIN — Medication 30 MILLIGRAM(S): at 22:10

## 2024-07-05 RX ADMIN — Medication 100 MILLIGRAM(S): at 05:40

## 2024-07-05 RX ADMIN — Medication 500 MILLIGRAM(S): at 11:53

## 2024-07-05 RX ADMIN — MEXILETINE HYDROCHLORIDE 200 MILLIGRAM(S): 250 CAPSULE ORAL at 13:41

## 2024-07-05 NOTE — PROGRESS NOTE ADULT - ASSESSMENT
Ms. Bloom is a 73 year old woman with multiple medical comorbidities and history of Type A aortic dissection with Bentall procedure presenting with chest pain from outside hospital with concern for Type B dissection.    Recommendations:  - Continue blood pressure management   - Vascular team will continue to follow  - Management per CTICU team       Vascular Surgery [id number]

## 2024-07-05 NOTE — CONSULT NOTE ADULT - NS ATTEND AMEND GEN_ALL_CORE FT
Chart, labs, vitals, radiology reviewed. Above H&P reviewed and edited where appropriate. Agree with history and physical exam. Agree with assessment and plan. I reviewed the overnight course of events and discussed the care with the patient/ family. All the decisions in assessment and plan are made by me.

## 2024-07-05 NOTE — PROGRESS NOTE ADULT - SUBJECTIVE AND OBJECTIVE BOX
SURGERY DAILY PROGRESS NOTE    24 Hour/Overnight Events: No acute events overnight    SUBJECTIVE: Patient seen and evaluated on AM rounds. Pt is resting comfortably in bed with no acute complaints.    ------------------------------------------------------------------------------------------------------------  OBJECTIVE:  Vital Signs Last 24 Hrs  T(C): 36.7 (05 Jul 2024 12:00), Max: 37.1 (04 Jul 2024 16:00)  T(F): 98 (05 Jul 2024 12:00), Max: 98.8 (04 Jul 2024 16:00)  HR: 56 (05 Jul 2024 12:00) (49 - 85)  BP: 136/62 (05 Jul 2024 12:00) (115/56 - 136/62)  BP(mean): 89 (05 Jul 2024 12:00) (80 - 89)  RR: 29 (05 Jul 2024 12:00) (16 - 29)  SpO2: 94% (05 Jul 2024 12:00) (90% - 98%)    Parameters below as of 05 Jul 2024 12:00  Patient On (Oxygen Delivery Method): room air      I&O's Detail    04 Jul 2024 07:01  -  05 Jul 2024 07:00  --------------------------------------------------------  IN:    Insulin: 19 mL    NiCARdipine: 37.5 mL    Oral Fluid: 1100 mL  Total IN: 1156.5 mL  OUT:    Voided (mL): 1850 mL  Total OUT: 1850 mL  Total NET: -693.5 mL      05 Jul 2024 07:01  -  05 Jul 2024 13:03  --------------------------------------------------------  IN:    Insulin: 10 mL    Oral Fluid: 450 mL  Total IN: 460 mL  OUT:    Voided (mL): 425 mL  Total OUT: 425 mL  Total NET: 35 mL      LABS:                        10.6   11.51 )-----------( 198      ( 05 Jul 2024 00:44 )             34.6     07-05    137  |  102  |  30<H>  ----------------------------<  330<H>  4.7   |  22  |  0.71    Ca    9.0      05 Jul 2024 00:44  Phos  2.8     07-05  Mg     2.1     07-05    TPro  6.1  /  Alb  3.3  /  TBili  0.2  /  DBili  x   /  AST  12  /  ALT  13  /  AlkPhos  79  07-05    LIVER FUNCTIONS - ( 05 Jul 2024 00:44 )  Alb: 3.3 g/dL / Pro: 6.1 g/dL / ALK PHOS: 79 U/L / ALT: 13 U/L / AST: 12 U/L / GGT: x             PHYSICAL EXAM:  General - well-nourished, no acute distress  Neuro - awake, alert, oriented x4, no acute focal deficits  Lungs - breathing comfortably on nasal cannula   Heart - pulse normal   Abdomen - soft, nontender, nondistended  Extremities - all four extremities are warm, strength and sensation intact in bilateral upper and lower extremities  Pulse exam:  - bilateral carotid pulses palpable  - bilateral axial, brachial, radial, and ulnar pulses palpable  - bilateral femoral, popliteal, and DPs palpable  - bilateral PT signals present

## 2024-07-05 NOTE — CONSULT NOTE ADULT - ASSESSMENT
73 year old woman with multiple medical comorbidities and history of Type A aortic dissection with Bentall procedure presenting with chest pain from outside hospital with concern for Type B dissection.    Assessment  DMT2: 73y Female with DM T2 with hyperglycemia, A1C 7.8%, was on insulin at home, now on basal bolus insulin with coverage, blood sugars running high. Eating meals transitioned to basal bolus insulins. On steroids as well, having hyperglycemias.   Type B diss: on medications, stable, monitored. Vasc following.   HTN: on antihypertensive medications, monitored, asymptomatic.  Asthma: on steroids at home. O2 supplementation as needed.       Discussed plan and management wit Dr Christina Vasquez MD  Cell: [phone]  Office: [phone]             73 year old woman with multiple medical comorbidities and history of Type A aortic dissection with Bentall procedure presenting with chest pain from outside hospital with concern for Type B dissection.      Assessment  DMT2: 73y Female with DM T2 with hyperglycemia, A1C 7.8%, was on insulin at home, now on basal bolus insulin with coverage, blood sugars running high. Eating meals transitioned to basal bolus insulins. On steroids as well, having hyperglycemias.   Type B diss: on medications, stable, monitored. Vasc following.   HTN: on antihypertensive medications, monitored, asymptomatic.  Asthma: on steroids at home. O2 supplementation as needed.       Discussed plan and management wit Dr Christina Vasquez MD  Cell: [phone]  Office: [phone]

## 2024-07-05 NOTE — CONSULT NOTE ADULT - PROBLEM SELECTOR RECOMMENDATION 9
Lantus 20 units  Can stop IV insulin 2 hours after basal insulin  Will start Admelog 8 units before each meal in addition to Admelog correction scale coverage.  Will continue monitoring FS, log, and glucose trends, will Follow up.  Patient counseled for compliance with consistent low carb diet and exercise as tolerated outpatient.   Was on home insulins  Dr Panchal endocrine

## 2024-07-05 NOTE — CONSULT NOTE ADULT - SUBJECTIVE AND OBJECTIVE BOX
HPI:  74 yo PMHx of Type A Aortic Dissection Repair (Bentall, Hemiarch, LAAL in 9/2022), s/p TAVR (9/2023, size 23 Telma Tristen), asthma on chronic steroids, bronchiectasis s/p surgery, allergic rhinitis,  recurrent PNA, PND, tracheomalacia s/p tracheoplasty, ESBL proteus infections (sputum),  diabetes, paroxysmal A-fib on Eliquis, colorectal cancer many years ago status post colostomy presents as a transfer from [facility] for type B aortic dissection.  Patient originally presented to [facility] for crushing chest pain described as a heaviness in her chest.  Patient endorses shortness of breath at this time and is not on oxygen at home.  Patient received a CT angio of the chest abdomen pelvis at the outside hospital which showed a type B aortic dissection with great vessel involvement.  All great vessels arise from the true lumen.  There was no sign of false lumen thrombosis or true lumen compression.  Previous aortic valve replacement and aortic root replacement seen.  Mild right middle lobe and right lower lobe bronchiolitis.  1.8 cm pancreatic tail cyst.  Previous AP resection of the rectum and sigmoid colon.  Patient denies any chest pain at this time.  States she had a couple episodes of vomiting yesterday.  Patient was also found to be COVID-positive incidentally.  EMS vitals on arrival were 124/74.  Patient is on a labetalol drip and 0.5.  Pulse is in the 50s. Patient transferred to CTU under Dr. Lozada for further management. (03 Jul 2024 14:19)      Patient has history of diabetes, A1C 7.8 % on home large dose insulins   Dr Panchal endocrine  Endo was consulted for glycemic control.      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      H/O pelvic surgery  5 years ago - s/p fracture      History of tracheomalacia  2015 - attempted tracheal stenting ([facility])- course complicated by obstruction, respiratory failure, multiple CPR attempts -  stent discontinued; 10/20/2016 Tracheobronchoplasty (Prolene Mesh) performed at [facility] by Dr Zapien      S/P bronchoscopy  6/5/2018 - [place] (Dr Zapien) no evidence of tracheobronchomalacia in trachea or bronchial tubes      Rectal bleeding  exam under anesthesia ([facility]) 2/2018      S/P TAVR (transcatheter aortic valve replacement)      S/P aortic valve replacement          FAMILY HISTORY:  Family history of asthma    Family history of breast cancer (Sibling)    Family history of diabetes mellitus type II        Social History:            HOME MEDICATIONS:  Home Medications:  Albuterol (Eqv-ProAir HFA) 90 mcg/inh inhalation aerosol: 2 puff(s) inhaled every 4 to 6 hours (03 Jul 2024 20:48)  atorvastatin 20 mg oral tablet: 1 tab(s) orally once a day (at bedtime) (26 Apr 2024 00:41)  Basaglar KwikPen 100 units/mL subcutaneous solution: 16 unit(s) subcutaneous once a day (at bedtime) and inject 6 units once a day in the morning (26 Apr 2024 00:41)  budesonide 0.5 mg/2 mL inhalation suspension: 2 milliliter(s) by nebulizer 2 times a day (21 Dec 2023 17:44)  diphenhydrAMINE 50 mg oral capsule: 1 cap(s) orally once a day as needed for  allergy symptoms (03 Jul 2024 20:45)  Eliquis 5 mg oral tablet: 1 tab(s) orally 2 times a day (21 Dec 2023 17:16)  HumaLOG KwikPen 100 units/mL injectable solution: injectable 3 times a day Inject as per sliding scale (21 Dec 2023 17:44)  Incruse Ellipta 62.5 mcg/inh inhalation powder: 1 inhaled once a day (03 Jul 2024 20:57)  Keppra 500 mg oral tablet: 2 tab(s) orally 2 times a day (03 Jul 2024 20:44)  montelukast 10 mg oral tablet: 1 tab(s) orally once a day (21 Dec 2023 17:44)  tiotropium 2.5 mcg/inh inhalation aerosol: 2 puff(s) inhaled 4 times a day (03 Jul 2024 20:51)            MEDICATIONS  (STANDING):  albuterol/ipratropium for Nebulization 3 milliLiter(s) Nebulizer every 6 hours  ascorbic acid 500 milliGRAM(s) Oral daily  atorvastatin 20 milliGRAM(s) Oral at bedtime  budesonide 160 MICROgram(s)/formoterol 4.5 MICROgram(s) Inhaler 2 Puff(s) Inhalation daily  dextrose 50% Injectable 50 milliLiter(s) IV Push every 15 minutes  gabapentin 100 milliGRAM(s) Oral every 8 hours  heparin   Injectable 5000 Unit(s) SubCutaneous every 8 hours  hydrALAZINE 25 milliGRAM(s) Oral every 8 hours  insulin glargine Injectable (LANTUS) 20 Unit(s) SubCutaneous once  insulin lispro (ADMELOG) corrective regimen sliding scale   SubCutaneous at bedtime  insulin lispro (ADMELOG) corrective regimen sliding scale   SubCutaneous three times a day before meals  insulin lispro Injectable (ADMELOG) 8 Unit(s) SubCutaneous three times a day before meals  labetalol 100 milliGRAM(s) Oral every 8 hours  levETIRAcetam 1000 milliGRAM(s) Oral two times a day  methylPREDNISolone 12 milliGRAM(s) Oral daily  mexiletine 200 milliGRAM(s) Oral every 8 hours  montelukast 10 milliGRAM(s) Oral daily  multivitamin 1 Tablet(s) Oral daily  mupirocin 2% Nasal 1 Application(s) Both Nostrils every 12 hours  pantoprazole    Tablet 40 milliGRAM(s) Oral before breakfast  polyethylene glycol 3350 17 Gram(s) Oral daily  senna 2 Tablet(s) Oral at bedtime  sodium chloride 0.9% lock flush 3 milliLiter(s) IV Push every 8 hours  tiotropium 2.5 MICROgram(s) Inhaler 2 Puff(s) Inhalation daily    MEDICATIONS  (PRN):      Allergies    penicillin (Anaphylaxis)  iodine (Short breath; Swelling)  Valium (Short breath)  OxyContin (Unknown)  meropenem (Unknown)  shellfish (Anaphylaxis)  ampicillin (Unknown)  tetanus toxoid (Short breath)  Percocet (Unknown)  aspirin (Short breath)  Dilaudid (Short breath)  codeine (Short breath)  Avelox (Short breath; Pruritus)  cefepime (Anaphylaxis)    Intolerances        Review of Systems:  Neuro: No HA, no dizziness  Cardiovascular: No chest pain, no palpitations  Respiratory: no SOB, no cough  GI: No nausea, vomiting, abdominal pain  MSK: Denies joint/muscle pain      ALL OTHER SYSTEMS REVIEWED AND NEGATIVE      PHYSICAL EXAM:  VITALS: T(C): 36.7 (07-05-24 @ 12:00)  T(F): 98 (07-05-24 @ 12:00), Max: 98.8 (07-04-24 @ 16:00)  HR: 60 (07-05-24 @ 15:00) (49 - 85)  BP: 136/62 (07-05-24 @ 12:00) (115/56 - 136/62)  RR:  (16 - 29)  SpO2:  (90% - 98%)  Wt(kg): --  GENERAL: NAD, well-groomed, well-developed  NEURO:  alert and oriented  RESPIRATORY: Clear to auscultation bilaterally; No rales, rhonchi, wheezing  CARDIOVASCULAR: Si S2  GI: Soft, non distended, normal bowel sounds  MUSCULOSKELETAL: Moves all extremities equally       POCT Blood Glucose.: 159 mg/dL (07-05-24 @ 15:10)  POCT Blood Glucose.: 197 mg/dL (07-05-24 @ 13:46)  POCT Blood Glucose.: 235 mg/dL (07-05-24 @ 12:49)  POCT Blood Glucose.: 258 mg/dL (07-05-24 @ 11:51)  POCT Blood Glucose.: 124 mg/dL (07-05-24 @ 07:56)  POCT Blood Glucose.: 127 mg/dL (07-05-24 @ 06:52)  POCT Blood Glucose.: 116 mg/dL (07-05-24 @ 06:24)  POCT Blood Glucose.: 97 mg/dL (07-05-24 @ 06:09)  POCT Blood Glucose.: 72 mg/dL (07-05-24 @ 05:50)  POCT Blood Glucose.: 102 mg/dL (07-05-24 @ 04:54)  POCT Blood Glucose.: 169 mg/dL (07-05-24 @ 04:01)  POCT Blood Glucose.: 241 mg/dL (07-05-24 @ 02:54)  POCT Blood Glucose.: 326 mg/dL (07-05-24 @ 01:46)  POCT Blood Glucose.: 292 mg/dL (07-05-24 @ 01:26)  POCT Blood Glucose.: 313 mg/dL (07-05-24 @ 00:30)  POCT Blood Glucose.: 280 mg/dL (07-04-24 @ 22:06)  POCT Blood Glucose.: 149 mg/dL (07-04-24 @ 16:31)  POCT Blood Glucose.: 231 mg/dL (07-04-24 @ 11:26)  POCT Blood Glucose.: 276 mg/dL (07-03-24 @ 09:23)  POCT Blood Glucose.: 303 mg/dL (07-03-24 @ 07:13)                            10.6   11.51 )-----------( 198      ( 05 Jul 2024 00:44 )             34.6       07-05    137  |  102  |  30<H>  ----------------------------<  330<H>  4.7   |  22  |  0.71    eGFR: 89    Ca    9.0      07-05  Mg     2.1     07-05  Phos  2.8     07-05    TPro  6.1  /  Alb  3.3  /  TBili  0.2  /  DBili  x   /  AST  12  /  ALT  13  /  AlkPhos  79  07-05      Thyroid Function Tests:  07-03 @ 14:53 TSH 0.30 FreeT4 1.2 T3 -- Anti TPO -- Anti Thyroglobulin Ab -- TSI --    Diet, Consistent Carbohydrate w/Evening Snack:   Supplement Feeding Modality:  Oral  Glucerna Shake Cans or Servings Per Day:  3       Frequency:  Daily (07-03-24 @ 18:26) [Active]          A1C with Estimated Average Glucose Result: 7.8 % (07-03-24 @ 14:53)  A1C with Estimated Average Glucose Result: 9.3 % (12-22-23 @ 04:49)  A1C with Estimated Average Glucose Result: 5.7 % (09-08-23 @ 06:39)                   HPI:  72 yo PMHx of Type A Aortic Dissection Repair ([facility]entall, Hemiarch, LAAL in 9/2022), s/p TAVR (9/2023, size 23 Telma Tristen), asthma on chronic steroids, bronchiectasis s/p surgery, allergic rhinitis,  recurrent PNA, PND, tracheomalacia s/p tracheoplasty, ESBL proteus infections (sputum),  diabetes, paroxysmal A-fib on Eliquis, colorectal cancer many years ago status post colostomy presents as a transfer from [facility] for type B aortic dissection.  Patient originally presented to [facility] for crushing chest pain described as a heaviness in her chest.  Patient endorses shortness of breath at this time and is not on oxygen at home.  Patient received a CT angio of the chest abdomen pelvis at the outside hospital which showed a type B aortic dissection with great vessel involvement.  All great vessels arise from the true lumen.  There was no sign of false lumen thrombosis or true lumen compression.  Previous aortic valve replacement and aortic root replacement seen.  Mild right middle lobe and right lower lobe bronchiolitis.  1.8 cm pancreatic tail cyst.  Previous AP resection of the rectum and sigmoid colon.  Patient denies any chest pain at this time.  States she had a couple episodes of vomiting yesterday.  Patient was also found to be COVID-positive incidentally.  EMS vitals on arrival were 124/74.  Patient is on a labetalol drip and 0.5.  Pulse is in the 50s. Patient transferred to CTU under Dr. Lozada for further management. (03 Jul 2024 14:19)      Patient has history of diabetes, A1C 7.8 % on home large dose insulins   Dr Panchal endocrine  Endo was consulted for glycemic control.      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      H/O pelvic surgery  5 years ago - s/p fracture      History of tracheomalacia  2015 - attempted tracheal stenting ([facility])- course complicated by obstruction, respiratory failure, multiple CPR attempts -  stent discontinued; 10/20/2016 Tracheobronchoplasty (Prolene Mesh) performed at [facility] by Dr Zapien      S/P bronchoscopy  6/5/2018 - [place] (Dr Zapien) no evidence of tracheobronchomalacia in trachea or bronchial tubes      Rectal bleeding  exam under anesthesia ([facility]) 2/2018      S/P TAVR (transcatheter aortic valve replacement)      S/P aortic valve replacement          FAMILY HISTORY:  Family history of asthma    Family history of breast cancer (Sibling)    Family history of diabetes mellitus type II        Social History:            HOME MEDICATIONS:  Home Medications:  Albuterol (Eqv-ProAir HFA) 90 mcg/inh inhalation aerosol: 2 puff(s) inhaled every 4 to 6 hours (03 Jul 2024 20:48)  atorvastatin 20 mg oral tablet: 1 tab(s) orally once a day (at bedtime) (26 Apr 2024 00:41)  Basaglar KwikPen 100 units/mL subcutaneous solution: 16 unit(s) subcutaneous once a day (at bedtime) and inject 6 units once a day in the morning (26 Apr 2024 00:41)  budesonide 0.5 mg/2 mL inhalation suspension: 2 milliliter(s) by nebulizer 2 times a day (21 Dec 2023 17:44)  diphenhydrAMINE 50 mg oral capsule: 1 cap(s) orally once a day as needed for  allergy symptoms (03 Jul 2024 20:45)  Eliquis 5 mg oral tablet: 1 tab(s) orally 2 times a day (21 Dec 2023 17:16)  HumaLOG KwikPen 100 units/mL injectable solution: injectable 3 times a day Inject as per sliding scale (21 Dec 2023 17:44)  Incruse Ellipta 62.5 mcg/inh inhalation powder: 1 inhaled once a day (03 Jul 2024 20:57)  Keppra 500 mg oral tablet: 2 tab(s) orally 2 times a day (03 Jul 2024 20:44)  montelukast 10 mg oral tablet: 1 tab(s) orally once a day (21 Dec 2023 17:44)  tiotropium 2.5 mcg/inh inhalation aerosol: 2 puff(s) inhaled 4 times a day (03 Jul 2024 20:51)            MEDICATIONS  (STANDING):  albuterol/ipratropium for Nebulization 3 milliLiter(s) Nebulizer every 6 hours  ascorbic acid 500 milliGRAM(s) Oral daily  atorvastatin 20 milliGRAM(s) Oral at bedtime  budesonide 160 MICROgram(s)/formoterol 4.5 MICROgram(s) Inhaler 2 Puff(s) Inhalation daily  dextrose 50% Injectable 50 milliLiter(s) IV Push every 15 minutes  gabapentin 100 milliGRAM(s) Oral every 8 hours  heparin   Injectable 5000 Unit(s) SubCutaneous every 8 hours  hydrALAZINE 25 milliGRAM(s) Oral every 8 hours  insulin glargine Injectable (LANTUS) 20 Unit(s) SubCutaneous once  insulin lispro (ADMELOG) corrective regimen sliding scale   SubCutaneous at bedtime  insulin lispro (ADMELOG) corrective regimen sliding scale   SubCutaneous three times a day before meals  insulin lispro Injectable (ADMELOG) 8 Unit(s) SubCutaneous three times a day before meals  labetalol 100 milliGRAM(s) Oral every 8 hours  levETIRAcetam 1000 milliGRAM(s) Oral two times a day  methylPREDNISolone 12 milliGRAM(s) Oral daily  mexiletine 200 milliGRAM(s) Oral every 8 hours  montelukast 10 milliGRAM(s) Oral daily  multivitamin 1 Tablet(s) Oral daily  mupirocin 2% Nasal 1 Application(s) Both Nostrils every 12 hours  pantoprazole    Tablet 40 milliGRAM(s) Oral before breakfast  polyethylene glycol 3350 17 Gram(s) Oral daily  senna 2 Tablet(s) Oral at bedtime  sodium chloride 0.9% lock flush 3 milliLiter(s) IV Push every 8 hours  tiotropium 2.5 MICROgram(s) Inhaler 2 Puff(s) Inhalation daily    MEDICATIONS  (PRN):      Allergies    penicillin (Anaphylaxis)  iodine (Short breath; Swelling)  Valium (Short breath)  OxyContin (Unknown)  meropenem (Unknown)  shellfish (Anaphylaxis)  ampicillin (Unknown)  tetanus toxoid (Short breath)  Percocet (Unknown)  aspirin (Short breath)  Dilaudid (Short breath)  codeine (Short breath)  Avelox (Short breath; Pruritus)  cefepime (Anaphylaxis)    Intolerances        Review of Systems:  Neuro: No HA, no dizziness  Cardiovascular: No chest pain, no palpitations  Respiratory: no SOB, no cough  GI: No nausea, vomiting, abdominal pain  MSK: Denies joint/muscle pain      ALL OTHER SYSTEMS REVIEWED AND NEGATIVE      PHYSICAL EXAM:  VITALS: T(C): 36.7 (07-05-24 @ 12:00)  T(F): 98 (07-05-24 @ 12:00), Max: 98.8 (07-04-24 @ 16:00)  HR: 60 (07-05-24 @ 15:00) (49 - 85)  BP: 136/62 (07-05-24 @ 12:00) (115/56 - 136/62)  RR:  (16 - 29)  SpO2:  (90% - 98%)  Wt(kg): --  GENERAL: NAD, well-groomed, well-developed  NEURO:  alert and oriented  RESPIRATORY: Clear to auscultation bilaterally; No rales, rhonchi, wheezing  CARDIOVASCULAR: Si S2  GI: Soft, non distended, normal bowel sounds  MUSCULOSKELETAL: Moves all extremities equally       POCT Blood Glucose.: 159 mg/dL (07-05-24 @ 15:10)  POCT Blood Glucose.: 197 mg/dL (07-05-24 @ 13:46)  POCT Blood Glucose.: 235 mg/dL (07-05-24 @ 12:49)  POCT Blood Glucose.: 258 mg/dL (07-05-24 @ 11:51)  POCT Blood Glucose.: 124 mg/dL (07-05-24 @ 07:56)  POCT Blood Glucose.: 127 mg/dL (07-05-24 @ 06:52)  POCT Blood Glucose.: 116 mg/dL (07-05-24 @ 06:24)  POCT Blood Glucose.: 97 mg/dL (07-05-24 @ 06:09)  POCT Blood Glucose.: 72 mg/dL (07-05-24 @ 05:50)  POCT Blood Glucose.: 102 mg/dL (07-05-24 @ 04:54)  POCT Blood Glucose.: 169 mg/dL (07-05-24 @ 04:01)  POCT Blood Glucose.: 241 mg/dL (07-05-24 @ 02:54)  POCT Blood Glucose.: 326 mg/dL (07-05-24 @ 01:46)  POCT Blood Glucose.: 292 mg/dL (07-05-24 @ 01:26)  POCT Blood Glucose.: 313 mg/dL (07-05-24 @ 00:30)  POCT Blood Glucose.: 280 mg/dL (07-04-24 @ 22:06)  POCT Blood Glucose.: 149 mg/dL (07-04-24 @ 16:31)  POCT Blood Glucose.: 231 mg/dL (07-04-24 @ 11:26)  POCT Blood Glucose.: 276 mg/dL (07-03-24 @ 09:23)  POCT Blood Glucose.: 303 mg/dL (07-03-24 @ 07:13)                            10.6   11.51 )-----------( 198      ( 05 Jul 2024 00:44 )             34.6       07-05    137  |  102  |  30<H>  ----------------------------<  330<H>  4.7   |  22  |  0.71    eGFR: 89    Ca    9.0      07-05  Mg     2.1     07-05  Phos  2.8     07-05    TPro  6.1  /  Alb  3.3  /  TBili  0.2  /  DBili  x   /  AST  12  /  ALT  13  /  AlkPhos  79  07-05      Thyroid Function Tests:  07-03 @ 14:53 TSH 0.30 FreeT4 1.2 T3 -- Anti TPO -- Anti Thyroglobulin Ab -- TSI --    Diet, Consistent Carbohydrate w/Evening Snack:   Supplement Feeding Modality:  Oral  Glucerna Shake Cans or Servings Per Day:  3       Frequency:  Daily (07-03-24 @ 18:26) [Active]          A1C with Estimated Average Glucose Result: 7.8 % (07-03-24 @ 14:53)  A1C with Estimated Average Glucose Result: 9.3 % (12-22-23 @ 04:49)  A1C with Estimated Average Glucose Result: 5.7 % (09-08-23 @ 06:39)

## 2024-07-05 NOTE — PROGRESS NOTE ADULT - SUBJECTIVE AND OBJECTIVE BOX
CRITICAL CARE ATTENDING - CTICU    MEDICATIONS  (STANDING):  albuterol/ipratropium for Nebulization 3 milliLiter(s) Nebulizer every 6 hours  ascorbic acid 500 milliGRAM(s) Oral daily  atorvastatin 20 milliGRAM(s) Oral at bedtime  budesonide 160 MICROgram(s)/formoterol 4.5 MICROgram(s) Inhaler 2 Puff(s) Inhalation daily  dextrose 50% Injectable 50 milliLiter(s) IV Push every 15 minutes  gabapentin 100 milliGRAM(s) Oral every 8 hours  heparin   Injectable 5000 Unit(s) SubCutaneous every 8 hours  hydrALAZINE 25 milliGRAM(s) Oral every 8 hours  insulin lispro (ADMELOG) corrective regimen sliding scale   SubCutaneous three times a day before meals  insulin lispro (ADMELOG) corrective regimen sliding scale   SubCutaneous at bedtime  insulin regular Infusion 3 Unit(s)/Hr (3 mL/Hr) IV Continuous <Continuous>  labetalol 100 milliGRAM(s) Oral every 8 hours  levETIRAcetam 1000 milliGRAM(s) Oral two times a day  methylPREDNISolone 12 milliGRAM(s) Oral daily  mexiletine 200 milliGRAM(s) Oral every 8 hours  montelukast 10 milliGRAM(s) Oral daily  multivitamin 1 Tablet(s) Oral daily  mupirocin 2% Nasal 1 Application(s) Both Nostrils every 12 hours  pantoprazole    Tablet 40 milliGRAM(s) Oral before breakfast  polyethylene glycol 3350 17 Gram(s) Oral daily  senna 2 Tablet(s) Oral at bedtime  sodium chloride 0.9% lock flush 3 milliLiter(s) IV Push every 8 hours  tiotropium 2.5 MICROgram(s) Inhaler 2 Puff(s) Inhalation daily                                    10.6   11.51 )-----------( 198      ( 2024 00:44 )             34.6       07-05    137  |  102  |  30<H>  ----------------------------<  330<H>  4.7   |  22  |  0.71    Ca    9.0      2024 00:44  Phos  2.8     07-05  Mg     2.1     07-05    TPro  6.1  /  Alb  3.3  /  TBili  0.2  /  DBili  x   /  AST  12  /  ALT  13  /  AlkPhos  79        PT/INR - ( 2024 12:58 )   PT: 11.4 sec;   INR: 1.04 ratio         PTT - ( 2024 12:58 )  PTT:30.9 sec        Daily     Daily Weight in k.8 (2024 00:00)      07-04 @ 07:01  -  - @ 07:00  --------------------------------------------------------  IN: 1156.5 mL / OUT: 1850 mL / NET: -693.5 mL        Critically Ill patient  : [ ] preoperative ,   [x ] post operative (Re-admit)    Requires :  [x ] Arterial Line   [ ] Central Line  [ ] PA catheter  [ ] IABP  [ ] ECMO  [ ] LVAD  [ ] Ventilator  [ ] pacemaker [ ] Impella.                      [x ] ABG's     [x ] Pulse Oxymetry Monitoring  Bedside evaluation , monitoring , treatment of hemodynamics , fluids , IVP/ IVCD meds.        Diagnosis:     7/3/2024- Admitted for chest pressure, known Type B Dissection    22- Type A dissection repair, Modified bentall and hemiarch    Hypotension a/w Hypertension, requiring intravenous medication.     Bradycardia     Hypovolemia     Hemodynamic lability,  instability. Requires IVCD / IVP / PO   [x ]  Beta Blockers [ x] vasodilators   [x ]IVSS fluid  to maintain MAP, perfusion, C.I.       Conversion to PO medications     COPD     Colostomy - colon CA     Recent seizures (2023)- on Keppra     Hyperglycemia- IVCD insulin/ Sliding scale    Prerenal Azotemia     Requires bedside physical therapy, mobilization and total care home care     f/u CT - C/A/P            I, Bon Jiménez, personally performed the services described in this documentation. All medical record entries made by the scribe were at my direction and in my presence. I have reviewed the chart and agree that the record reflects my personal performance and is accurate and complete.   Bon Jiménez MD.       By signing my name below, I, Kieran Plascencia, attest that this documentation has been prepared under the direction and in the presence of Bon Jiménez MD.   Electronically Signed: Georgi Cordero 24 @ 07:22        Discussed with CT surgeon, Physician Assistant - Nurse Practitioner- Critical care medicine team.   Dicussed at  AM / PM rounds.   Chart, labs , films reviewed.    Cumulative Critical Care Time Given Today:

## 2024-07-06 LAB
ALBUMIN SERPL ELPH-MCNC: 3.4 G/DL — SIGNIFICANT CHANGE UP (ref 3.3–5)
ALP SERPL-CCNC: 78 U/L — SIGNIFICANT CHANGE UP (ref 40–120)
ALT FLD-CCNC: 15 U/L — SIGNIFICANT CHANGE UP (ref 10–45)
ANION GAP SERPL CALC-SCNC: 9 MMOL/L — SIGNIFICANT CHANGE UP (ref 5–17)
ANISOCYTOSIS BLD QL: SLIGHT — SIGNIFICANT CHANGE UP
AST SERPL-CCNC: 10 U/L — SIGNIFICANT CHANGE UP (ref 10–40)
BASOPHILS # BLD AUTO: 0 K/UL — SIGNIFICANT CHANGE UP (ref 0–0.2)
BASOPHILS NFR BLD AUTO: 0 % — SIGNIFICANT CHANGE UP (ref 0–2)
BILIRUB SERPL-MCNC: 0.1 MG/DL — LOW (ref 0.2–1.2)
BUN SERPL-MCNC: 22 MG/DL — SIGNIFICANT CHANGE UP (ref 7–23)
BURR CELLS BLD QL SMEAR: PRESENT — SIGNIFICANT CHANGE UP
CALCIUM SERPL-MCNC: 9.1 MG/DL — SIGNIFICANT CHANGE UP (ref 8.4–10.5)
CHLORIDE SERPL-SCNC: 105 MMOL/L — SIGNIFICANT CHANGE UP (ref 96–108)
CO2 SERPL-SCNC: 25 MMOL/L — SIGNIFICANT CHANGE UP (ref 22–31)
CREAT SERPL-MCNC: 0.66 MG/DL — SIGNIFICANT CHANGE UP (ref 0.5–1.3)
EGFR: 91 ML/MIN/1.73M2 — SIGNIFICANT CHANGE UP
ELLIPTOCYTES BLD QL SMEAR: SLIGHT — SIGNIFICANT CHANGE UP
EOSINOPHIL # BLD AUTO: 0 K/UL — SIGNIFICANT CHANGE UP (ref 0–0.5)
EOSINOPHIL NFR BLD AUTO: 0 % — SIGNIFICANT CHANGE UP (ref 0–6)
GAS PNL BLDA: SIGNIFICANT CHANGE UP
GAS PNL BLDA: SIGNIFICANT CHANGE UP
GIANT PLATELETS BLD QL SMEAR: PRESENT — SIGNIFICANT CHANGE UP
GLUCOSE BLDC GLUCOMTR-MCNC: 100 MG/DL — HIGH (ref 70–99)
GLUCOSE BLDC GLUCOMTR-MCNC: 118 MG/DL — HIGH (ref 70–99)
GLUCOSE BLDC GLUCOMTR-MCNC: 144 MG/DL — HIGH (ref 70–99)
GLUCOSE BLDC GLUCOMTR-MCNC: 158 MG/DL — HIGH (ref 70–99)
GLUCOSE BLDC GLUCOMTR-MCNC: 172 MG/DL — HIGH (ref 70–99)
GLUCOSE BLDC GLUCOMTR-MCNC: 179 MG/DL — HIGH (ref 70–99)
GLUCOSE BLDC GLUCOMTR-MCNC: 256 MG/DL — HIGH (ref 70–99)
GLUCOSE BLDC GLUCOMTR-MCNC: 98 MG/DL — SIGNIFICANT CHANGE UP (ref 70–99)
GLUCOSE SERPL-MCNC: 218 MG/DL — HIGH (ref 70–99)
HCT VFR BLD CALC: 35.5 % — SIGNIFICANT CHANGE UP (ref 34.5–45)
HGB BLD-MCNC: 11 G/DL — LOW (ref 11.5–15.5)
LYMPHOCYTES # BLD AUTO: 1.44 K/UL — SIGNIFICANT CHANGE UP (ref 1–3.3)
LYMPHOCYTES # BLD AUTO: 13.4 % — SIGNIFICANT CHANGE UP (ref 13–44)
MAGNESIUM SERPL-MCNC: 2 MG/DL — SIGNIFICANT CHANGE UP (ref 1.6–2.6)
MANUAL SMEAR VERIFICATION: SIGNIFICANT CHANGE UP
MCHC RBC-ENTMCNC: 21 PG — LOW (ref 27–34)
MCHC RBC-ENTMCNC: 31 GM/DL — LOW (ref 32–36)
MCV RBC AUTO: 67.6 FL — LOW (ref 80–100)
METAMYELOCYTES # FLD: 0.9 % — HIGH (ref 0–0)
MICROCYTES BLD QL: SLIGHT — SIGNIFICANT CHANGE UP
MONOCYTES # BLD AUTO: 1.05 K/UL — HIGH (ref 0–0.9)
MONOCYTES NFR BLD AUTO: 9.8 % — SIGNIFICANT CHANGE UP (ref 2–14)
NEUTROPHILS # BLD AUTO: 8.14 K/UL — HIGH (ref 1.8–7.4)
NEUTROPHILS NFR BLD AUTO: 75.9 % — SIGNIFICANT CHANGE UP (ref 43–77)
OVALOCYTES BLD QL SMEAR: SLIGHT — SIGNIFICANT CHANGE UP
PHOSPHATE SERPL-MCNC: 2.8 MG/DL — SIGNIFICANT CHANGE UP (ref 2.5–4.5)
PLAT MORPH BLD: NORMAL — SIGNIFICANT CHANGE UP
PLATELET # BLD AUTO: 200 K/UL — SIGNIFICANT CHANGE UP (ref 150–400)
POIKILOCYTOSIS BLD QL AUTO: SLIGHT — SIGNIFICANT CHANGE UP
POLYCHROMASIA BLD QL SMEAR: SLIGHT — SIGNIFICANT CHANGE UP
POTASSIUM SERPL-MCNC: 4.3 MMOL/L — SIGNIFICANT CHANGE UP (ref 3.5–5.3)
POTASSIUM SERPL-SCNC: 4.3 MMOL/L — SIGNIFICANT CHANGE UP (ref 3.5–5.3)
PROT SERPL-MCNC: 6.1 G/DL — SIGNIFICANT CHANGE UP (ref 6–8.3)
RBC # BLD: 5.25 M/UL — HIGH (ref 3.8–5.2)
RBC # FLD: 22.9 % — HIGH (ref 10.3–14.5)
RBC BLD AUTO: SIGNIFICANT CHANGE UP
SODIUM SERPL-SCNC: 139 MMOL/L — SIGNIFICANT CHANGE UP (ref 135–145)
TARGETS BLD QL SMEAR: SLIGHT — SIGNIFICANT CHANGE UP
WBC # BLD: 10.72 K/UL — HIGH (ref 3.8–10.5)
WBC # FLD AUTO: 10.72 K/UL — HIGH (ref 3.8–10.5)

## 2024-07-06 PROCEDURE — 71045 X-RAY EXAM CHEST 1 VIEW: CPT | Mod: 26

## 2024-07-06 PROCEDURE — 99233 SBSQ HOSP IP/OBS HIGH 50: CPT

## 2024-07-06 PROCEDURE — 99232 SBSQ HOSP IP/OBS MODERATE 35: CPT | Mod: FS

## 2024-07-06 RX ORDER — INSULIN REGULAR, HUMAN 100/ML
5 VIAL (ML) INJECTION
Qty: 100 | Refills: 0 | Status: DISCONTINUED | OUTPATIENT
Start: 2024-07-06 | End: 2024-07-06

## 2024-07-06 RX ORDER — INSULIN LISPRO 100 [IU]/ML
9 INJECTION, SOLUTION SUBCUTANEOUS
Refills: 0 | Status: DISCONTINUED | OUTPATIENT
Start: 2024-07-06 | End: 2024-07-07

## 2024-07-06 RX ORDER — INSULIN GLARGINE 100 [IU]/ML
28 INJECTION, SOLUTION SUBCUTANEOUS AT BEDTIME
Refills: 0 | Status: DISCONTINUED | OUTPATIENT
Start: 2024-07-06 | End: 2024-07-07

## 2024-07-06 RX ORDER — HYDRALAZINE HYDROCHLORIDE 50 MG/1
10 TABLET ORAL ONCE
Refills: 0 | Status: COMPLETED | OUTPATIENT
Start: 2024-07-06 | End: 2024-07-06

## 2024-07-06 RX ADMIN — HYDRALAZINE HYDROCHLORIDE 25 MILLIGRAM(S): 50 TABLET ORAL at 06:17

## 2024-07-06 RX ADMIN — Medication 100 MILLIGRAM(S): at 21:34

## 2024-07-06 RX ADMIN — HYDRALAZINE HYDROCHLORIDE 25 MILLIGRAM(S): 50 TABLET ORAL at 13:43

## 2024-07-06 RX ADMIN — MUPIROCIN 1 APPLICATION(S): 20 OINTMENT TOPICAL at 06:18

## 2024-07-06 RX ADMIN — MEXILETINE HYDROCHLORIDE 200 MILLIGRAM(S): 250 CAPSULE ORAL at 21:34

## 2024-07-06 RX ADMIN — MEXILETINE HYDROCHLORIDE 200 MILLIGRAM(S): 250 CAPSULE ORAL at 06:17

## 2024-07-06 RX ADMIN — HEPARIN SODIUM 5000 UNIT(S): 50 INJECTION, SOLUTION INTRAVENOUS at 13:44

## 2024-07-06 RX ADMIN — HYDRALAZINE HYDROCHLORIDE 25 MILLIGRAM(S): 50 TABLET ORAL at 21:35

## 2024-07-06 RX ADMIN — HEPARIN SODIUM 5000 UNIT(S): 50 INJECTION, SOLUTION INTRAVENOUS at 21:34

## 2024-07-06 RX ADMIN — INSULIN GLARGINE 28 UNIT(S): 100 INJECTION, SOLUTION SUBCUTANEOUS at 21:32

## 2024-07-06 RX ADMIN — IPRATROPIUM BROMIDE AND ALBUTEROL SULFATE 3 MILLILITER(S): .5; 3 SOLUTION RESPIRATORY (INHALATION) at 06:00

## 2024-07-06 RX ADMIN — Medication 12 MILLIGRAM(S): at 06:17

## 2024-07-06 RX ADMIN — Medication 3 MILLILITER(S): at 22:15

## 2024-07-06 RX ADMIN — LABETALOL HYDROCHLORIDE 100 MILLIGRAM(S): 300 TABLET ORAL at 06:16

## 2024-07-06 RX ADMIN — IPRATROPIUM BROMIDE AND ALBUTEROL SULFATE 3 MILLILITER(S): .5; 3 SOLUTION RESPIRATORY (INHALATION) at 17:20

## 2024-07-06 RX ADMIN — Medication 500 MILLIGRAM(S): at 11:28

## 2024-07-06 RX ADMIN — INSULIN LISPRO 3: 100 INJECTION, SOLUTION SUBCUTANEOUS at 12:21

## 2024-07-06 RX ADMIN — MEXILETINE HYDROCHLORIDE 200 MILLIGRAM(S): 250 CAPSULE ORAL at 13:42

## 2024-07-06 RX ADMIN — Medication 2 PUFF(S): at 12:24

## 2024-07-06 RX ADMIN — INSULIN LISPRO 8 UNIT(S): 100 INJECTION, SOLUTION SUBCUTANEOUS at 12:21

## 2024-07-06 RX ADMIN — POLYETHYLENE GLYCOL 3350 17 GRAM(S): 1 POWDER ORAL at 06:18

## 2024-07-06 RX ADMIN — LABETALOL HYDROCHLORIDE 100 MILLIGRAM(S): 300 TABLET ORAL at 13:43

## 2024-07-06 RX ADMIN — Medication 1 TABLET(S): at 11:28

## 2024-07-06 RX ADMIN — Medication 3 MILLILITER(S): at 12:34

## 2024-07-06 RX ADMIN — HEPARIN SODIUM 5000 UNIT(S): 50 INJECTION, SOLUTION INTRAVENOUS at 06:17

## 2024-07-06 RX ADMIN — Medication 5 UNIT(S)/HR: at 01:00

## 2024-07-06 RX ADMIN — Medication 3 MILLILITER(S): at 07:19

## 2024-07-06 RX ADMIN — LEVETIRACETAM 1000 MILLIGRAM(S): 100 INJECTION INTRAVENOUS at 06:17

## 2024-07-06 RX ADMIN — INSULIN LISPRO 1: 100 INJECTION, SOLUTION SUBCUTANEOUS at 17:16

## 2024-07-06 RX ADMIN — LEVETIRACETAM 1000 MILLIGRAM(S): 100 INJECTION INTRAVENOUS at 17:18

## 2024-07-06 RX ADMIN — IPRATROPIUM BROMIDE AND ALBUTEROL SULFATE 3 MILLILITER(S): .5; 3 SOLUTION RESPIRATORY (INHALATION) at 12:23

## 2024-07-06 RX ADMIN — HYDRALAZINE HYDROCHLORIDE 10 MILLIGRAM(S): 50 TABLET ORAL at 00:35

## 2024-07-06 RX ADMIN — TIOTROPIUM BROMIDE 2 PUFF(S): 18 CAPSULE ORAL; RESPIRATORY (INHALATION) at 12:24

## 2024-07-06 RX ADMIN — INSULIN LISPRO 1: 100 INJECTION, SOLUTION SUBCUTANEOUS at 08:06

## 2024-07-06 RX ADMIN — PANTOPRAZOLE SODIUM 40 MILLIGRAM(S): 40 INJECTION, POWDER, FOR SOLUTION INTRAVENOUS at 06:16

## 2024-07-06 RX ADMIN — ATORVASTATIN CALCIUM 20 MILLIGRAM(S): 20 TABLET, FILM COATED ORAL at 21:34

## 2024-07-06 RX ADMIN — INSULIN LISPRO 9 UNIT(S): 100 INJECTION, SOLUTION SUBCUTANEOUS at 17:16

## 2024-07-06 RX ADMIN — MUPIROCIN 1 APPLICATION(S): 20 OINTMENT TOPICAL at 17:18

## 2024-07-06 RX ADMIN — LABETALOL HYDROCHLORIDE 100 MILLIGRAM(S): 300 TABLET ORAL at 21:35

## 2024-07-06 RX ADMIN — Medication 100 MILLIGRAM(S): at 06:17

## 2024-07-06 RX ADMIN — MONTELUKAST SODIUM 10 MILLIGRAM(S): 10 TABLET, FILM COATED ORAL at 11:28

## 2024-07-06 RX ADMIN — Medication 100 MILLIGRAM(S): at 13:43

## 2024-07-06 RX ADMIN — INSULIN LISPRO 8 UNIT(S): 100 INJECTION, SOLUTION SUBCUTANEOUS at 08:06

## 2024-07-06 NOTE — PROGRESS NOTE ADULT - PROBLEM SELECTOR PLAN 2
prednisone qd  pulm following  resp isolation on admission  prednisone qd  pulm following  resp isolation

## 2024-07-06 NOTE — PROGRESS NOTE ADULT - SUBJECTIVE AND OBJECTIVE BOX
Chief complaint    Patient is a 73y old  Female who presents with a chief complaint of type B dx (06 Jul 2024 11:46)   Review of systems  Patient appears comfortable.    Labs and Fingersticks  CAPILLARY BLOOD GLUCOSE  172 (06 Jul 2024 08:00)  144 (06 Jul 2024 05:00)  118 (06 Jul 2024 04:00)  98 (06 Jul 2024 03:00)  100 (06 Jul 2024 02:00)  221 (06 Jul 2024 01:00)  204 (05 Jul 2024 23:00)      POCT Blood Glucose.: 256 mg/dL (06 Jul 2024 12:04)  POCT Blood Glucose.: 172 mg/dL (06 Jul 2024 08:04)  POCT Blood Glucose.: 144 mg/dL (06 Jul 2024 04:50)  POCT Blood Glucose.: 118 mg/dL (06 Jul 2024 04:01)  POCT Blood Glucose.: 98 mg/dL (06 Jul 2024 03:12)  POCT Blood Glucose.: 100 mg/dL (06 Jul 2024 02:20)  POCT Blood Glucose.: 204 mg/dL (05 Jul 2024 22:56)      Anion Gap: 9 (07-06 @ 00:20)  Anion Gap: 13 (07-05 @ 00:44)      Calcium: 9.1 (07-06 @ 00:20)  Calcium: 9.0 (07-05 @ 00:44)  Albumin: 3.4 (07-06 @ 00:20)  Albumin: 3.3 (07-05 @ 00:44)    Alanine Aminotransferase (ALT/SGPT): 15 (07-06 @ 00:20)  Alanine Aminotransferase (ALT/SGPT): 13 (07-05 @ 00:44)  Alkaline Phosphatase: 78 (07-06 @ 00:20)  Alkaline Phosphatase: 79 (07-05 @ 00:44)  Aspartate Aminotransferase (AST/SGOT): 10 (07-06 @ 00:20)  Aspartate Aminotransferase (AST/SGOT): 12 (07-05 @ 00:44)        07-06    139  |  105  |  22  ----------------------------<  218<H>  4.3   |  25  |  0.66    Ca    9.1      06 Jul 2024 00:20  Phos  2.8     07-06  Mg     2.0     07-06    TPro  6.1  /  Alb  3.4  /  TBili  0.1<L>  /  DBili  x   /  AST  10  /  ALT  15  /  AlkPhos  78  07-06                        11.0   10.72 )-----------( 200      ( 06 Jul 2024 00:20 )             35.5     Medications  MEDICATIONS  (STANDING):  albuterol/ipratropium for Nebulization 3 milliLiter(s) Nebulizer every 6 hours  ascorbic acid 500 milliGRAM(s) Oral daily  atorvastatin 20 milliGRAM(s) Oral at bedtime  budesonide 160 MICROgram(s)/formoterol 4.5 MICROgram(s) Inhaler 2 Puff(s) Inhalation daily  dextrose 50% Injectable 50 milliLiter(s) IV Push every 15 minutes  gabapentin 100 milliGRAM(s) Oral every 8 hours  heparin   Injectable 5000 Unit(s) SubCutaneous every 8 hours  hydrALAZINE 25 milliGRAM(s) Oral every 8 hours  insulin glargine Injectable (LANTUS) 28 Unit(s) SubCutaneous at bedtime  insulin lispro (ADMELOG) corrective regimen sliding scale   SubCutaneous three times a day before meals  insulin lispro (ADMELOG) corrective regimen sliding scale   SubCutaneous at bedtime  insulin lispro Injectable (ADMELOG) 9 Unit(s) SubCutaneous three times a day before meals  labetalol 100 milliGRAM(s) Oral every 8 hours  levETIRAcetam 1000 milliGRAM(s) Oral two times a day  methylPREDNISolone 12 milliGRAM(s) Oral daily  mexiletine 200 milliGRAM(s) Oral every 8 hours  montelukast 10 milliGRAM(s) Oral daily  multivitamin 1 Tablet(s) Oral daily  mupirocin 2% Nasal 1 Application(s) Both Nostrils every 12 hours  NIFEdipine XL 30 milliGRAM(s) Oral daily  pantoprazole    Tablet 40 milliGRAM(s) Oral before breakfast  polyethylene glycol 3350 17 Gram(s) Oral daily  senna 2 Tablet(s) Oral at bedtime  sodium chloride 0.9% lock flush 3 milliLiter(s) IV Push every 8 hours  tiotropium 2.5 MICROgram(s) Inhaler 2 Puff(s) Inhalation daily      Physical Exam  General: Patient appears comfortable.  Vital Signs Last 12 Hrs  T(F): 98 (07-06-24 @ 13:00), Max: 98.6 (07-06-24 @ 07:00)  HR: 65 (07-06-24 @ 13:00) (57 - 65)  BP: 125/58 (07-06-24 @ 13:00) (113/55 - 125/58)  BP(mean): 79 (07-06-24 @ 11:00) (79 - 79)  RR: 18 (07-06-24 @ 13:00) (13 - 22)  SpO2: 96% (07-06-24 @ 13:00) (92% - 99%)  Neck: No palpable thyroid nodules.  CVS: S1S2, No murmurs  Respiratory: No wheezing, no crepitations  GI: Abdomen soft, non tender.    Diagnostics        Radiology:

## 2024-07-06 NOTE — PROGRESS NOTE ADULT - ASSESSMENT
History of Present Illness:   72 yo PMHx of Type A Aortic Dissection Repair (Bentall, Hemiarch, LAAL in 9/2022), s/p TAVR (9/2023, size 23 Telma Tristen), asthma on chronic steroids, bronchiectasis s/p surgery, allergic rhinitis,  recurrent PNA, PND, tracheomalacia s/p tracheoplasty, ESBL proteus infections (sputum),  diabetes, paroxysmal A-fib on Eliquis, colorectal cancer many years ago status post colostomy presents as a transfer from [facility] for type B aortic dissection,   originally presented to [facility] for crushing chest pain described as a heaviness in her ches, t endorses shortness of breath  .  Patient received a CT angio of the chest abdomen pelvis at the outside hospital which showed a type B aortic dissection with great vessel involvement. no sign of false lumen thrombosis or true lumen compression.  Previous aortic valve replacement and aortic root replacement seen.    Previous AP resection of the rectum and sigmoid colon.  Patient denies any chest pain at this time.  States she had a couple episodes of vomiting yesterday.  Patient was also found to be COVID-positive incidentally.  EMS vitals on arrival were 124/74,  labetalol drip initiated  .   7/3   CTU  Insulin Gtt,    No EKG changes  BP control  7/6    2 malone   Nifedipine  30 qd    hydral 25 q8,   labetolol 100 q8   prednisone 12MG QD History of Present Illness:   72 yo PMHx of Type A Aortic Dissection Repair (Bentall, Hemiarch, LAAL in 9/2022), s/p TAVR (9/2023, size 23 Telma Tristen), asthma on chronic steroids, bronchiectasis s/p surgery, allergic rhinitis,  recurrent PNA, PND, tracheomalacia s/p tracheoplasty, ESBL proteus infections (sputum),  diabetes, paroxysmal A-fib on Eliquis, colorectal cancer many years ago status post colostomy presents as a transfer from [facility] for type B aortic dissection,   originally presented to [facility] for crushing chest pain described as a heaviness in her ches, t endorses shortness of breath  .  Patient received a CT angio of the chest abdomen pelvis at the outside hospital which showed a type B aortic dissection with great vessel involvement. no sign of false lumen thrombosis or true lumen compression.  Previous aortic valve replacement and aortic root replacement seen.    Previous AP resection of the rectum and sigmoid colon.  Patient denies any chest pain at this time.  States she had a couple episodes of vomiting yesterday.  Patient was also found to be COVID-positive incidentally.  EMS vitals on arrival were 124/74,  labetalol drip initiated  .   7/3   CTU  Insulin Gtt,    No EKG changes  BP control  7/6    2 malone     NSR   w brief acc jun,   + covid isolation ,Nifedipine  30 qd    hydral 25 q8,   labetolol 100 q8   prednisone 12MG QD

## 2024-07-06 NOTE — PROGRESS NOTE ADULT - ASSESSMENT
73 year old woman with multiple medical comorbidities and history of Type A aortic dissection with Bentall procedure presenting with chest pain from outside hospital with concern for Type B dissection.      Assessment  DMT2: 73y Female with DM T2 with hyperglycemia, A1C 7.8%, was on insulin at home, now on basal bolus insulin with coverage, blood  Eating meals transitioned to basal bolus insulins. On steroids as well, having hyperglycemias, sugars are running high..   Type B diss: on medications, stable, monitored. Vasc following.   HTN: on antihypertensive medications, monitored, asymptomatic.  Asthma: on steroids at home. O2 supplementation as needed.       Jayshree Vasquez MD  Cell: [phone]  Office: [phone]

## 2024-07-06 NOTE — PROGRESS NOTE ADULT - SUBJECTIVE AND OBJECTIVE BOX
VITAL SIGNS    Telemetry:      Vital Signs Last 24 Hrs  T(C): 36.9 (24 @ 11:00), Max: 37.2 (24 @ 20:00)  T(F): 98.4 (24 @ 11:00), Max: 98.9 (24 @ 20:00)  HR: 59 (24 @ 10:00) (53 - 61)  BP: 113/55 (24 @ 11:00) (102/55 - 151/67)  RR: 22 (24 @ 10:00) (13 - 49)  SpO2: 93% (24 @ 10:00) (92% - 99%)                   Daily     Daily Weight in k.1 (2024 00:00)      Bilirubin Total: 0.1 mg/dL ( @ 00:20)    CAPILLARY BLOOD GLUCOSE  172 (2024 08:00)  144 (2024 05:00)  118 (2024 04:00)  98 (2024 03:00)  100 (2024 02:00)  221 (2024 01:00)  204 (2024 23:00)  105 (2024 16:00)  159 (2024 15:00)  197 (2024 14:00)  235 (2024 13:00)  256 (2024 12:00)      POCT Blood Glucose.: 172 mg/dL (2024 08:04)  POCT Blood Glucose.: 144 mg/dL (2024 04:50)  POCT Blood Glucose.: 118 mg/dL (2024 04:01)  POCT Blood Glucose.: 98 mg/dL (2024 03:12)  POCT Blood Glucose.: 100 mg/dL (2024 02:20)  POCT Blood Glucose.: 204 mg/dL (2024 22:56)  POCT Blood Glucose.: 105 mg/dL (2024 16:19)  POCT Blood Glucose.: 159 mg/dL (2024 15:10)  POCT Blood Glucose.: 197 mg/dL (2024 13:46)  POCT Blood Glucose.: 235 mg/dL (2024 12:49)  POCT Blood Glucose.: 258 mg/dL (2024 11:51)                  PHYSICAL EXAM  s"  Neurology: alert and oriented x 3, moves all extremities with no defecits  CV :  RRR    Lungs:   CTA B/L  Abdomen: soft, nontender, nondistended, positive bowel sounds, last bowel movement   Extremities:                                            VITAL SIGNS    Telemetry:    sr  60   brief acc [facility]    Vital Signs Last 24 Hrs  T(C): 36.9 (24 @ 11:00), Max: 37.2 (24 @ 20:00)  T(F): 98.4 (24 @ 11:00), Max: 98.9 (24 @ 20:00)  HR: 59 (24 @ 10:00) (53 - 61)  BP: 113/55 (24 @ 11:00) (102/55 - 151/67)  RR: 22 (24 @ 10:00) (13 - 49)  SpO2: 93% (24 @ 10:00) (92% - 99%)                   Daily     Daily Weight in k.1 (2024 00:00)      Bilirubin Total: 0.1 mg/dL ( @ 00:20)    CAPILLARY BLOOD GLUCOSE  172 (2024 08:00)  144 (2024 05:00)  118 (2024 04:00)  98 (2024 03:00)  100 (2024 02:00)  221 (2024 01:00)  204 (2024 23:00)  105 (2024 16:00)  159 (2024 15:00)  197 (2024 14:00)  235 (2024 13:00)  256 (2024 12:00)      POCT Blood Glucose.: 172 mg/dL (2024 08:04)  POCT Blood Glucose.: 144 mg/dL (2024 04:50)  POCT Blood Glucose.: 118 mg/dL (2024 04:01)  POCT Blood Glucose.: 98 mg/dL (2024 03:12)  POCT Blood Glucose.: 100 mg/dL (2024 02:20)  POCT Blood Glucose.: 204 mg/dL (2024 22:56)  POCT Blood Glucose.: 105 mg/dL (2024 16:19)  POCT Blood Glucose.: 159 mg/dL (2024 15:10)  POCT Blood Glucose.: 197 mg/dL (2024 13:46)  POCT Blood Glucose.: 235 mg/dL (2024 12:49)  POCT Blood Glucose.: 258 mg/dL (2024 11:51)                  PHYSICAL EXAM  s"  No SOB  No CP"  Neurology: alert and oriented x 3, moves all extremities with no defecits  CV :  RRR    Lungs:   CTA B/L  Abdomen: soft, nontender, nondistended, positive bowel sounds, last bowel movement   Extremities:     lt radial sera w bleeding    no edema

## 2024-07-06 NOTE — PROGRESS NOTE ADULT - SUBJECTIVE AND OBJECTIVE BOX
Patient seen and examined at the bedside.    Remained critically ill on continuous ICU monitoring.    OBJECTIVE:  Vital Signs Last 24 Hrs  T(C): 37 (06 Jul 2024 07:00), Max: 37.2 (05 Jul 2024 20:00)  T(F): 98.6 (06 Jul 2024 07:00), Max: 98.9 (05 Jul 2024 20:00)  HR: 58 (06 Jul 2024 09:00) (53 - 61)  BP: 151/67 (06 Jul 2024 00:00) (102/55 - 151/67)  BP(mean): 96 (06 Jul 2024 00:00) (77 - 96)  RR: 22 (06 Jul 2024 09:00) (13 - 49)  SpO2: 92% (06 Jul 2024 09:00) (90% - 99%)    Parameters below as of 06 Jul 2024 09:00  Patient On (Oxygen Delivery Method): room air          Physical Exam:   General: NAD   Neurology: nonfocal   Eyes: bilateral pupils equal and reactive   ENT/Neck: Neck supple, trachea midline, No JVD   Respiratory: Clear bilaterally   CV: S1S2, no murmurs        [x] Sternal dressing        [x] Sinus Bradycardia  Abdominal: Soft, NT, ND +BS   Extremities: 1-2+ pedal edema noted, + peripheral pulses   Skin: No Rashes, Hematoma, Ecchymosis                           Assessment:  72 yo PMHx of Type A Aortic Dissection Repair (Bentall, Hemiarch, LAAL in 9/2022), s/p TAVR (9/2023, size 23 Telma Tristen), asthma on chronic steroids, bronchiectasis s/p surgery, allergic rhinitis,  recurrent PNA, PND, tracheomalacia s/p tracheoplasty, ESBL proteus infections (sputum),  diabetes, paroxysmal A-fib on Eliquis, colorectal cancer many years ago status post colostomy presents as a transfer from [facility] for type B aortic dissection. Patient transferred to CTU under Dr. Lozada for further management.    Hypovolemia  Post op respiratory insufficiency  Acute blood loss anemia  Stress hyperglycemia    Plan:   ***Neuro***  [x] Hx of CVA   [x] Nonfocal  Post operative neuro assessment   Gabapentin for pain management  Continue Keppra for seizures    ***Cardiovascular***  Invasive hemodynamic monitoring, assess perfusion indices   SB / MAP 67 / Hct 35.5% / Lactate 0.7  Continuos reassessment of hemodynamics  Hydralazine for afterload reduction  Continue Labetalol, Mexiletine, and Nifedipine  [x] VTE ppx with SQ Heparin  [x] Statin   Serial EKG and cardiac enzymes     ***Pulmonary***  on Room Air  Follow SpO2, CXR, blood gasses   Encourage incentive spirometry, continue pulse ox monitoring, follow ABGs        Continue with bronchodilators as needed.    ***GI***  [x]  Diet:  CC  [x] Protonix  Bowel regimen with Miralax and Senna    ***Renal***  Continue to monitor I/Os, BUN/Creatinine.   Replete lytes PRN    ***ID***  No active antibiotic coverage      ***Endocrine***  [x] Stress Hyperglycemia : HbA1c 7.8%                - [x] Insulin gtt  [x]  ISS  [x]  Lantus             - Need tight glycemic control to prevent wound infection.            Patient requires continuous monitoring with bedside rhythm monitoring, pulse oximetry monitoring, and continuous central venous and arterial pressure monitoring; and intermittent blood gas analysis. Care plan discussed with the ICU care team.   Patient remained critical, at risk for life threatening decompensation.    I have spent 30 minutes providing critical care management to this patient.    By signing my name below, I, Yaneli Wilkins, attest that this documentation has been prepared under the direction and in the presence of KIANA Cuellar  Electronically signed: Georgi Mchugh, 07-06-24 @ 09:15    I, KIANA Cuellar, personally performed the services described in this documentation. all medical record entries made by the marcyibronaldo were at my direction and in my presence. I have reviewed the chart and agree that the record reflects my personal performance and is accurate and complete  Electronically signed: KIANA Cuellar

## 2024-07-07 LAB
ANION GAP SERPL CALC-SCNC: 12 MMOL/L — SIGNIFICANT CHANGE UP (ref 5–17)
BUN SERPL-MCNC: 20 MG/DL — SIGNIFICANT CHANGE UP (ref 7–23)
CALCIUM SERPL-MCNC: 8.9 MG/DL — SIGNIFICANT CHANGE UP (ref 8.4–10.5)
CHLORIDE SERPL-SCNC: 102 MMOL/L — SIGNIFICANT CHANGE UP (ref 96–108)
CO2 SERPL-SCNC: 26 MMOL/L — SIGNIFICANT CHANGE UP (ref 22–31)
CREAT SERPL-MCNC: 0.64 MG/DL — SIGNIFICANT CHANGE UP (ref 0.5–1.3)
EGFR: 92 ML/MIN/1.73M2 — SIGNIFICANT CHANGE UP
GLUCOSE BLDC GLUCOMTR-MCNC: 141 MG/DL — HIGH (ref 70–99)
GLUCOSE BLDC GLUCOMTR-MCNC: 143 MG/DL — HIGH (ref 70–99)
GLUCOSE BLDC GLUCOMTR-MCNC: 161 MG/DL — HIGH (ref 70–99)
GLUCOSE BLDC GLUCOMTR-MCNC: 172 MG/DL — HIGH (ref 70–99)
GLUCOSE SERPL-MCNC: 182 MG/DL — HIGH (ref 70–99)
HCT VFR BLD CALC: 38.9 % — SIGNIFICANT CHANGE UP (ref 34.5–45)
HGB BLD-MCNC: 11.2 G/DL — LOW (ref 11.5–15.5)
MCHC RBC-ENTMCNC: 20.1 PG — LOW (ref 27–34)
MCHC RBC-ENTMCNC: 28.8 GM/DL — LOW (ref 32–36)
MCV RBC AUTO: 69.7 FL — LOW (ref 80–100)
NRBC # BLD: 0 /100 WBCS — SIGNIFICANT CHANGE UP (ref 0–0)
PLATELET # BLD AUTO: 192 K/UL — SIGNIFICANT CHANGE UP (ref 150–400)
POTASSIUM SERPL-MCNC: 4.7 MMOL/L — SIGNIFICANT CHANGE UP (ref 3.5–5.3)
POTASSIUM SERPL-SCNC: 4.7 MMOL/L — SIGNIFICANT CHANGE UP (ref 3.5–5.3)
RBC # BLD: 5.58 M/UL — HIGH (ref 3.8–5.2)
RBC # FLD: 22.9 % — HIGH (ref 10.3–14.5)
SODIUM SERPL-SCNC: 140 MMOL/L — SIGNIFICANT CHANGE UP (ref 135–145)
WBC # BLD: 9.19 K/UL — SIGNIFICANT CHANGE UP (ref 3.8–10.5)
WBC # FLD AUTO: 9.19 K/UL — SIGNIFICANT CHANGE UP (ref 3.8–10.5)

## 2024-07-07 PROCEDURE — 99232 SBSQ HOSP IP/OBS MODERATE 35: CPT

## 2024-07-07 RX ORDER — INSULIN LISPRO 100 [IU]/ML
9 INJECTION, SOLUTION SUBCUTANEOUS
Refills: 0 | Status: DISCONTINUED | OUTPATIENT
Start: 2024-07-07 | End: 2024-07-08

## 2024-07-07 RX ORDER — INSULIN GLARGINE 100 [IU]/ML
30 INJECTION, SOLUTION SUBCUTANEOUS AT BEDTIME
Refills: 0 | Status: DISCONTINUED | OUTPATIENT
Start: 2024-07-07 | End: 2024-07-08

## 2024-07-07 RX ADMIN — MUPIROCIN 1 APPLICATION(S): 20 OINTMENT TOPICAL at 17:06

## 2024-07-07 RX ADMIN — IPRATROPIUM BROMIDE AND ALBUTEROL SULFATE 3 MILLILITER(S): .5; 3 SOLUTION RESPIRATORY (INHALATION) at 00:23

## 2024-07-07 RX ADMIN — HEPARIN SODIUM 5000 UNIT(S): 50 INJECTION, SOLUTION INTRAVENOUS at 14:20

## 2024-07-07 RX ADMIN — INSULIN LISPRO 9 UNIT(S): 100 INJECTION, SOLUTION SUBCUTANEOUS at 17:05

## 2024-07-07 RX ADMIN — LEVETIRACETAM 1000 MILLIGRAM(S): 100 INJECTION INTRAVENOUS at 06:19

## 2024-07-07 RX ADMIN — Medication 100 MILLIGRAM(S): at 14:22

## 2024-07-07 RX ADMIN — IPRATROPIUM BROMIDE AND ALBUTEROL SULFATE 3 MILLILITER(S): .5; 3 SOLUTION RESPIRATORY (INHALATION) at 23:28

## 2024-07-07 RX ADMIN — Medication 2 TABLET(S): at 21:16

## 2024-07-07 RX ADMIN — Medication 100 MILLIGRAM(S): at 06:10

## 2024-07-07 RX ADMIN — Medication 100 MILLIGRAM(S): at 21:15

## 2024-07-07 RX ADMIN — PANTOPRAZOLE SODIUM 40 MILLIGRAM(S): 40 INJECTION, POWDER, FOR SOLUTION INTRAVENOUS at 06:20

## 2024-07-07 RX ADMIN — LABETALOL HYDROCHLORIDE 100 MILLIGRAM(S): 300 TABLET ORAL at 14:21

## 2024-07-07 RX ADMIN — TIOTROPIUM BROMIDE 2 PUFF(S): 18 CAPSULE ORAL; RESPIRATORY (INHALATION) at 09:17

## 2024-07-07 RX ADMIN — MUPIROCIN 1 APPLICATION(S): 20 OINTMENT TOPICAL at 06:20

## 2024-07-07 RX ADMIN — Medication 3 MILLILITER(S): at 06:11

## 2024-07-07 RX ADMIN — IPRATROPIUM BROMIDE AND ALBUTEROL SULFATE 3 MILLILITER(S): .5; 3 SOLUTION RESPIRATORY (INHALATION) at 11:58

## 2024-07-07 RX ADMIN — HEPARIN SODIUM 5000 UNIT(S): 50 INJECTION, SOLUTION INTRAVENOUS at 21:15

## 2024-07-07 RX ADMIN — INSULIN LISPRO 1: 100 INJECTION, SOLUTION SUBCUTANEOUS at 07:59

## 2024-07-07 RX ADMIN — MEXILETINE HYDROCHLORIDE 200 MILLIGRAM(S): 250 CAPSULE ORAL at 21:15

## 2024-07-07 RX ADMIN — HYDRALAZINE HYDROCHLORIDE 25 MILLIGRAM(S): 50 TABLET ORAL at 14:21

## 2024-07-07 RX ADMIN — ATORVASTATIN CALCIUM 20 MILLIGRAM(S): 20 TABLET, FILM COATED ORAL at 21:15

## 2024-07-07 RX ADMIN — HYDRALAZINE HYDROCHLORIDE 25 MILLIGRAM(S): 50 TABLET ORAL at 21:15

## 2024-07-07 RX ADMIN — HYDRALAZINE HYDROCHLORIDE 25 MILLIGRAM(S): 50 TABLET ORAL at 06:19

## 2024-07-07 RX ADMIN — Medication 2 PUFF(S): at 09:17

## 2024-07-07 RX ADMIN — MEXILETINE HYDROCHLORIDE 200 MILLIGRAM(S): 250 CAPSULE ORAL at 06:19

## 2024-07-07 RX ADMIN — Medication 3 MILLILITER(S): at 21:20

## 2024-07-07 RX ADMIN — LABETALOL HYDROCHLORIDE 100 MILLIGRAM(S): 300 TABLET ORAL at 21:18

## 2024-07-07 RX ADMIN — Medication 1 TABLET(S): at 09:15

## 2024-07-07 RX ADMIN — INSULIN LISPRO 9 UNIT(S): 100 INJECTION, SOLUTION SUBCUTANEOUS at 08:00

## 2024-07-07 RX ADMIN — IPRATROPIUM BROMIDE AND ALBUTEROL SULFATE 3 MILLILITER(S): .5; 3 SOLUTION RESPIRATORY (INHALATION) at 17:05

## 2024-07-07 RX ADMIN — LEVETIRACETAM 1000 MILLIGRAM(S): 100 INJECTION INTRAVENOUS at 17:05

## 2024-07-07 RX ADMIN — Medication 12 MILLIGRAM(S): at 06:19

## 2024-07-07 RX ADMIN — LABETALOL HYDROCHLORIDE 100 MILLIGRAM(S): 300 TABLET ORAL at 06:19

## 2024-07-07 RX ADMIN — Medication 500 MILLIGRAM(S): at 09:15

## 2024-07-07 RX ADMIN — HEPARIN SODIUM 5000 UNIT(S): 50 INJECTION, SOLUTION INTRAVENOUS at 06:18

## 2024-07-07 RX ADMIN — Medication 3 MILLILITER(S): at 11:24

## 2024-07-07 RX ADMIN — MONTELUKAST SODIUM 10 MILLIGRAM(S): 10 TABLET, FILM COATED ORAL at 09:15

## 2024-07-07 RX ADMIN — MEXILETINE HYDROCHLORIDE 200 MILLIGRAM(S): 250 CAPSULE ORAL at 14:20

## 2024-07-07 RX ADMIN — IPRATROPIUM BROMIDE AND ALBUTEROL SULFATE 3 MILLILITER(S): .5; 3 SOLUTION RESPIRATORY (INHALATION) at 06:20

## 2024-07-07 RX ADMIN — Medication 30 MILLIGRAM(S): at 06:20

## 2024-07-07 RX ADMIN — POLYETHYLENE GLYCOL 3350 17 GRAM(S): 1 POWDER ORAL at 09:15

## 2024-07-07 RX ADMIN — INSULIN LISPRO 9 UNIT(S): 100 INJECTION, SOLUTION SUBCUTANEOUS at 11:57

## 2024-07-07 RX ADMIN — INSULIN GLARGINE 30 UNIT(S): 100 INJECTION, SOLUTION SUBCUTANEOUS at 21:22

## 2024-07-07 NOTE — PROGRESS NOTE ADULT - ASSESSMENT
73 year old woman with multiple medical comorbidities and history of Type A aortic dissection with Bentall procedure presenting with chest pain from outside hospital with concern for Type B dissection.    Assessment  DMT2: 73y Female with DM T2 with hyperglycemia, A1C 7.8%, was on insulin at home, now on basal bolus insulin with coverage, blood  Eating meals transitioned to basal bolus insulins. On steroids as well, having hyperglycemias, sugars are running high.  Type B diss: on medications, stable, monitored. Vasc following.   HTN: on antihypertensive medications, monitored, asymptomatic.  Asthma: on steroids at home. O2 supplementation as needed.       Discussed plan and management with Dr Christina Hidalgo NP - TEAMS  Jayshree Vasquez MD  Cell: [phone]  Office: [phone]              73 year old woman with multiple medical comorbidities and history of Type A aortic dissection with Bentall procedure presenting with chest pain from outside hospital with concern for Type B dissection.      Assessment  DMT2: 73y Female with DM T2 with hyperglycemia, A1C 7.8%, was on insulin at home, now on basal bolus insulin with coverage, blood  Eating meals transitioned to basal bolus insulins. On steroids as well, having hyperglycemias, sugars are running high.  Type B diss: on medications, stable, monitored. Vasc following.   HTN: on antihypertensive medications, monitored, asymptomatic.  Asthma: on steroids at home. O2 supplementation as needed.       Discussed plan and management with Dr Christina Hidalgo NP - TEAMS  Jayshree Vasquez MD  Cell: [phone]  Office: [phone]

## 2024-07-07 NOTE — PROGRESS NOTE ADULT - ASSESSMENT
History of Present Illness:   74 yo PMHx of Type A Aortic Dissection Repair (Bentall, Hemiarch, LAAL in 9/2022), s/p TAVR (9/2023, size 23 Telma Tristen), asthma on chronic steroids, bronchiectasis s/p surgery, allergic rhinitis,  recurrent PNA, PND, tracheomalacia s/p tracheoplasty, ESBL proteus infections (sputum),  diabetes, paroxysmal A-fib on Eliquis, colorectal cancer many years ago status post colostomy presents as a transfer from [facility] for type B aortic dissection,   originally presented to [facility] for crushing chest pain described as a heaviness in her ches, t endorses shortness of breath  .  Patient received a CT angio of the chest abdomen pelvis at the outside hospital which showed a type B aortic dissection with great vessel involvement. no sign of false lumen thrombosis or true lumen compression.  Previous aortic valve replacement and aortic root replacement seen.    Previous AP resection of the rectum and sigmoid colon.  Patient denies any chest pain at this time.  States she had a couple episodes of vomiting yesterday.  Patient was also found to be COVID-positive incidentally.  EMS vitals on arrival were 124/74,  labetalol drip initiated  .   7/3   CTU  Insulin Gtt,    No EKG changes  BP control  7/6    2 malone     NSR   w brief acc jun,   + covid isolation ,Nifedipine  30 qd    hydral 25 q8,   labetolol 100 q8   prednisone 12MG QD  7/7 vss vascular following, blood pressure regimen maintained

## 2024-07-07 NOTE — PROGRESS NOTE ADULT - SUBJECTIVE AND OBJECTIVE BOX
Chief complaint  Patient is a 73y old  Female who presents with a chief complaint of Chest pressure (06 Jul 2024 16:40)         Labs and Fingersticks  CAPILLARY BLOOD GLUCOSE      POCT Blood Glucose.: 143 mg/dL (07 Jul 2024 11:52)  POCT Blood Glucose.: 172 mg/dL (07 Jul 2024 07:57)  POCT Blood Glucose.: 158 mg/dL (06 Jul 2024 21:16)  POCT Blood Glucose.: 179 mg/dL (06 Jul 2024 16:42)      Anion Gap: 12 (07-07 @ 07:00)  Anion Gap: 9 (07-06 @ 00:20)      Calcium: 8.9 (07-07 @ 07:00)  Calcium: 9.1 (07-06 @ 00:20)  Albumin: 3.4 (07-06 @ 00:20)    Alanine Aminotransferase (ALT/SGPT): 15 (07-06 @ 00:20)  Alkaline Phosphatase: 78 (07-06 @ 00:20)  Aspartate Aminotransferase (AST/SGOT): 10 (07-06 @ 00:20)        07-07    140  |  102  |  20  ----------------------------<  182<H>  4.7   |  26  |  0.64    Ca    8.9      07 Jul 2024 07:00  Phos  2.8     07-06  Mg     2.0     07-06    TPro  6.1  /  Alb  3.4  /  TBili  0.1<L>  /  DBili  x   /  AST  10  /  ALT  15  /  AlkPhos  78  07-06                        11.2   9.19  )-----------( 192      ( 07 Jul 2024 06:54 )             38.9     Medications  MEDICATIONS  (STANDING):  albuterol/ipratropium for Nebulization 3 milliLiter(s) Nebulizer every 6 hours  ascorbic acid 500 milliGRAM(s) Oral daily  atorvastatin 20 milliGRAM(s) Oral at bedtime  budesonide 160 MICROgram(s)/formoterol 4.5 MICROgram(s) Inhaler 2 Puff(s) Inhalation daily  dextrose 50% Injectable 50 milliLiter(s) IV Push every 15 minutes  gabapentin 100 milliGRAM(s) Oral every 8 hours  heparin   Injectable 5000 Unit(s) SubCutaneous every 8 hours  hydrALAZINE 25 milliGRAM(s) Oral every 8 hours  insulin glargine Injectable (LANTUS) 30 Unit(s) SubCutaneous at bedtime  insulin lispro (ADMELOG) corrective regimen sliding scale   SubCutaneous three times a day before meals  insulin lispro (ADMELOG) corrective regimen sliding scale   SubCutaneous at bedtime  insulin lispro Injectable (ADMELOG) 9 Unit(s) SubCutaneous three times a day before meals  labetalol 100 milliGRAM(s) Oral every 8 hours  levETIRAcetam 1000 milliGRAM(s) Oral two times a day  methylPREDNISolone 12 milliGRAM(s) Oral daily  mexiletine 200 milliGRAM(s) Oral every 8 hours  montelukast 10 milliGRAM(s) Oral daily  multivitamin 1 Tablet(s) Oral daily  mupirocin 2% Nasal 1 Application(s) Both Nostrils every 12 hours  NIFEdipine XL 30 milliGRAM(s) Oral daily  pantoprazole    Tablet 40 milliGRAM(s) Oral before breakfast  polyethylene glycol 3350 17 Gram(s) Oral daily  senna 2 Tablet(s) Oral at bedtime  sodium chloride 0.9% lock flush 3 milliLiter(s) IV Push every 8 hours  tiotropium 2.5 MICROgram(s) Inhaler 2 Puff(s) Inhalation daily      Physical Exam  General: Patient comfortable in bed   Vital Signs Last 12 Hrs  T(F): 98.3 (07-07-24 @ 12:08), Max: 99.7 (07-07-24 @ 06:00)  HR: 64 (07-07-24 @ 14:32) (64 - 78)  BP: 122/72 (07-07-24 @ 14:32) (114/69 - 122/72)  BP(mean): --  RR: 18 (07-07-24 @ 12:08) (18 - 18)  SpO2: 98% (07-07-24 @ 12:08) (98% - 99%)    CVS: S1S2   Respiratory: No wheezing, no crepitations  GI: Abdomen soft, bowel sounds positive  Musculoskeletal:  moves all extremities  : Voiding          Chief complaint  Patient is a 73y old  Female who presents with a chief complaint of Chest pressure (06 Jul 2024 16:40)       Labs and Fingersticks  CAPILLARY BLOOD GLUCOSE      POCT Blood Glucose.: 143 mg/dL (07 Jul 2024 11:52)  POCT Blood Glucose.: 172 mg/dL (07 Jul 2024 07:57)  POCT Blood Glucose.: 158 mg/dL (06 Jul 2024 21:16)  POCT Blood Glucose.: 179 mg/dL (06 Jul 2024 16:42)      Anion Gap: 12 (07-07 @ 07:00)  Anion Gap: 9 (07-06 @ 00:20)      Calcium: 8.9 (07-07 @ 07:00)  Calcium: 9.1 (07-06 @ 00:20)  Albumin: 3.4 (07-06 @ 00:20)    Alanine Aminotransferase (ALT/SGPT): 15 (07-06 @ 00:20)  Alkaline Phosphatase: 78 (07-06 @ 00:20)  Aspartate Aminotransferase (AST/SGOT): 10 (07-06 @ 00:20)        07-07    140  |  102  |  20  ----------------------------<  182<H>  4.7   |  26  |  0.64    Ca    8.9      07 Jul 2024 07:00  Phos  2.8     07-06  Mg     2.0     07-06    TPro  6.1  /  Alb  3.4  /  TBili  0.1<L>  /  DBili  x   /  AST  10  /  ALT  15  /  AlkPhos  78  07-06                        11.2   9.19  )-----------( 192      ( 07 Jul 2024 06:54 )             38.9     Medications  MEDICATIONS  (STANDING):  albuterol/ipratropium for Nebulization 3 milliLiter(s) Nebulizer every 6 hours  ascorbic acid 500 milliGRAM(s) Oral daily  atorvastatin 20 milliGRAM(s) Oral at bedtime  budesonide 160 MICROgram(s)/formoterol 4.5 MICROgram(s) Inhaler 2 Puff(s) Inhalation daily  dextrose 50% Injectable 50 milliLiter(s) IV Push every 15 minutes  gabapentin 100 milliGRAM(s) Oral every 8 hours  heparin   Injectable 5000 Unit(s) SubCutaneous every 8 hours  hydrALAZINE 25 milliGRAM(s) Oral every 8 hours  insulin glargine Injectable (LANTUS) 30 Unit(s) SubCutaneous at bedtime  insulin lispro (ADMELOG) corrective regimen sliding scale   SubCutaneous three times a day before meals  insulin lispro (ADMELOG) corrective regimen sliding scale   SubCutaneous at bedtime  insulin lispro Injectable (ADMELOG) 9 Unit(s) SubCutaneous three times a day before meals  labetalol 100 milliGRAM(s) Oral every 8 hours  levETIRAcetam 1000 milliGRAM(s) Oral two times a day  methylPREDNISolone 12 milliGRAM(s) Oral daily  mexiletine 200 milliGRAM(s) Oral every 8 hours  montelukast 10 milliGRAM(s) Oral daily  multivitamin 1 Tablet(s) Oral daily  mupirocin 2% Nasal 1 Application(s) Both Nostrils every 12 hours  NIFEdipine XL 30 milliGRAM(s) Oral daily  pantoprazole    Tablet 40 milliGRAM(s) Oral before breakfast  polyethylene glycol 3350 17 Gram(s) Oral daily  senna 2 Tablet(s) Oral at bedtime  sodium chloride 0.9% lock flush 3 milliLiter(s) IV Push every 8 hours  tiotropium 2.5 MICROgram(s) Inhaler 2 Puff(s) Inhalation daily      Physical Exam  General: Patient comfortable in bed   Vital Signs Last 12 Hrs  T(F): 98.3 (07-07-24 @ 12:08), Max: 99.7 (07-07-24 @ 06:00)  HR: 64 (07-07-24 @ 14:32) (64 - 78)  BP: 122/72 (07-07-24 @ 14:32) (114/69 - 122/72)  BP(mean): --  RR: 18 (07-07-24 @ 12:08) (18 - 18)  SpO2: 98% (07-07-24 @ 12:08) (98% - 99%)    CVS: S1S2   Respiratory: No wheezing, no crepitations  GI: Abdomen soft, bowel sounds positive  Musculoskeletal:  moves all extremities  : Voiding

## 2024-07-07 NOTE — PROGRESS NOTE ADULT - SUBJECTIVE AND OBJECTIVE BOX
Patient seen and evaluated at bedside with CTS team and on-call Attending during AM rounds.     SUBJECTIVE: "Hi." Denies acute chest pain, palpitations, or shortness of breath. No acute overnight events reported.    TELEMETRY:  Afib/V-paced     Vital Signs Last 24 Hrs  T(C): 36.8 (07-07-24 @ 12:08), Max: 37.6 (07-07-24 @ 06:00)  T(F): 98.3 (07-07-24 @ 12:08), Max: 99.7 (07-07-24 @ 06:00)  HR: 64 (07-07-24 @ 14:32) (62 - 78)  BP: 122/72 (07-07-24 @ 14:32) (114/69 - 144/72)  RR: 18 (07-07-24 @ 12:08) (18 - 18)  SpO2: 98% (07-07-24 @ 12:08) (97% - 99%)           INPUT/OUTPUT:  07-06 @ 07:01  -  07-07 @ 07:00  --------------------------------------------------------  IN: 905 mL / OUT: 2625 mL / NET: -1720 mL      LABS:  07-07  140  |  102  |  20  ----------------------------<  182<H>  4.7   |  26  |  0.64  Ca    8.9      07 Jul 2024 07:00             11.2   9.19  )-----------( 192      ( 07 Jul 2024 06:54 )             38.9            CAPILLARY BLOOD GLUCOSE  POCT Blood Glucose.: 143 mg/dL (07 Jul 2024 11:52)  POCT Blood Glucose.: 172 mg/dL (07 Jul 2024 07:57)  POCT Blood Glucose.: 158 mg/dL (06 Jul 2024 21:16)  POCT Blood Glucose.: 179 mg/dL (06 Jul 2024 16:42)          PHYSICAL EXAM:  NEURO: alert and oriented; no gross, focal neurological deficits appreciated at time of evaluation  HEART: s1, s2  LUNG: non-labored breathing with no use of accessory muscles  ABD: soft, (+) bowel sounds in all quadrants  EXT: range of motion intact, peripheral pulses intact bilaterally, no edema      ACTIVE MEDICATIONS:  albuterol/ipratropium for Nebulization 3 milliLiter(s) Nebulizer every 6 hours  ascorbic acid 500 milliGRAM(s) Oral daily  atorvastatin 20 milliGRAM(s) Oral at bedtime  budesonide 160 MICROgram(s)/formoterol 4.5 MICROgram(s) Inhaler 2 Puff(s) Inhalation daily  dextrose 50% Injectable 50 milliLiter(s) IV Push every 15 minutes  gabapentin 100 milliGRAM(s) Oral every 8 hours  heparin Injectable 5000 Unit(s) SubCutaneous every 8 hours  hydrALAZINE 25 milliGRAM(s) Oral every 8 hours  insulin glargine Injectable (LANTUS) 30 Unit(s) SubCutaneous at bedtime  insulin lispro (ADMELOG) corrective regimen sliding scale   SubCutaneous three times a day before meals  insulin lispro (ADMELOG) corrective regimen sliding scale   SubCutaneous at bedtime  insulin lispro Injectable (ADMELOG) 9 Unit(s) SubCutaneous three times a day before meals  labetalol 100 milliGRAM(s) Oral every 8 hours  levETIRAcetam 1000 milliGRAM(s) Oral two times a day  methylPREDNISolone 12 milliGRAM(s) Oral daily  mexiletine 200 milliGRAM(s) Oral every 8 hours  montelukast 10 milliGRAM(s) Oral daily  multivitamin 1 Tablet(s) Oral daily  mupirocin 2% Nasal 1 Application(s) Both Nostrils every 12 hours  NIFEdipine XL 30 milliGRAM(s) Oral daily  pantoprazole    Tablet 40 milliGRAM(s) Oral before breakfast  polyethylene glycol 3350 17 Gram(s) Oral daily  senna 2 Tablet(s) Oral at bedtime  sodium chloride 0.9% lock flush 3 milliLiter(s) IV Push every 8 hours  tiotropium 2.5 MICROgram(s) Inhaler 2 Puff(s) Inhalation daily    Case discussed in detail with Cardiothoracic Team and Attending. Plan below as per discussion. Patient seen and evaluated at bedside with CTS team and on-call Attending during AM rounds.     SUBJECTIVE: "Hi." Denies acute chest pain, palpitations, or shortness of breath. No acute overnight events reported.    TELEMETRY: SR 60-70    Vital Signs Last 24 Hrs  T(C): 36.8 (07-07-24 @ 12:08), Max: 37.6 (07-07-24 @ 06:00)  T(F): 98.3 (07-07-24 @ 12:08), Max: 99.7 (07-07-24 @ 06:00)  HR: 64 (07-07-24 @ 14:32) (62 - 78)  BP: 122/72 (07-07-24 @ 14:32) (114/69 - 144/72)  RR: 18 (07-07-24 @ 12:08) (18 - 18)  SpO2: 98% (07-07-24 @ 12:08) (97% - 99%)           INPUT/OUTPUT:  07-06 @ 07:01  -  07-07 @ 07:00  --------------------------------------------------------  IN: 905 mL / OUT: 2625 mL / NET: -1720 mL      LABS:  07-07  140  |  102  |  20  ----------------------------<  182<H>  4.7   |  26  |  0.64  Ca    8.9      07 Jul 2024 07:00             11.2   9.19  )-----------( 192      ( 07 Jul 2024 06:54 )             38.9            CAPILLARY BLOOD GLUCOSE  POCT Blood Glucose.: 143 mg/dL (07 Jul 2024 11:52)  POCT Blood Glucose.: 172 mg/dL (07 Jul 2024 07:57)  POCT Blood Glucose.: 158 mg/dL (06 Jul 2024 21:16)  POCT Blood Glucose.: 179 mg/dL (06 Jul 2024 16:42)          PHYSICAL EXAM:  NEURO: alert and oriented; no gross, focal neurological deficits appreciated at time of evaluation  HEART: s1, s2  LUNG: non-labored breathing with no use of accessory muscles  ABD: soft, (+) bowel sounds in all quadrants  EXT: range of motion intact, peripheral pulses intact bilaterally, no edema      ACTIVE MEDICATIONS:  albuterol/ipratropium for Nebulization 3 milliLiter(s) Nebulizer every 6 hours  ascorbic acid 500 milliGRAM(s) Oral daily  atorvastatin 20 milliGRAM(s) Oral at bedtime  budesonide 160 MICROgram(s)/formoterol 4.5 MICROgram(s) Inhaler 2 Puff(s) Inhalation daily  dextrose 50% Injectable 50 milliLiter(s) IV Push every 15 minutes  gabapentin 100 milliGRAM(s) Oral every 8 hours  heparin Injectable 5000 Unit(s) SubCutaneous every 8 hours  hydrALAZINE 25 milliGRAM(s) Oral every 8 hours  insulin glargine Injectable (LANTUS) 30 Unit(s) SubCutaneous at bedtime  insulin lispro (ADMELOG) corrective regimen sliding scale   SubCutaneous three times a day before meals  insulin lispro (ADMELOG) corrective regimen sliding scale   SubCutaneous at bedtime  insulin lispro Injectable (ADMELOG) 9 Unit(s) SubCutaneous three times a day before meals  labetalol 100 milliGRAM(s) Oral every 8 hours  levETIRAcetam 1000 milliGRAM(s) Oral two times a day  methylPREDNISolone 12 milliGRAM(s) Oral daily  mexiletine 200 milliGRAM(s) Oral every 8 hours  montelukast 10 milliGRAM(s) Oral daily  multivitamin 1 Tablet(s) Oral daily  mupirocin 2% Nasal 1 Application(s) Both Nostrils every 12 hours  NIFEdipine XL 30 milliGRAM(s) Oral daily  pantoprazole    Tablet 40 milliGRAM(s) Oral before breakfast  polyethylene glycol 3350 17 Gram(s) Oral daily  senna 2 Tablet(s) Oral at bedtime  sodium chloride 0.9% lock flush 3 milliLiter(s) IV Push every 8 hours  tiotropium 2.5 MICROgram(s) Inhaler 2 Puff(s) Inhalation daily    Case discussed in detail with Cardiothoracic Team and Attending. Plan below as per discussion.

## 2024-07-08 ENCOUNTER — TRANSCRIPTION ENCOUNTER (OUTPATIENT)
Age: 74
End: 2024-07-08

## 2024-07-08 VITALS
SYSTOLIC BLOOD PRESSURE: 102 MMHG | DIASTOLIC BLOOD PRESSURE: 54 MMHG | RESPIRATION RATE: 18 BRPM | OXYGEN SATURATION: 96 % | TEMPERATURE: 99 F | HEART RATE: 62 BPM

## 2024-07-08 LAB
ANION GAP SERPL CALC-SCNC: 10 MMOL/L — SIGNIFICANT CHANGE UP (ref 5–17)
BUN SERPL-MCNC: 22 MG/DL — SIGNIFICANT CHANGE UP (ref 7–23)
CALCIUM SERPL-MCNC: 9.4 MG/DL — SIGNIFICANT CHANGE UP (ref 8.4–10.5)
CHLORIDE SERPL-SCNC: 101 MMOL/L — SIGNIFICANT CHANGE UP (ref 96–108)
CO2 SERPL-SCNC: 29 MMOL/L — SIGNIFICANT CHANGE UP (ref 22–31)
CREAT SERPL-MCNC: 0.73 MG/DL — SIGNIFICANT CHANGE UP (ref 0.5–1.3)
EGFR: 86 ML/MIN/1.73M2 — SIGNIFICANT CHANGE UP
GLUCOSE BLDC GLUCOMTR-MCNC: 111 MG/DL — HIGH (ref 70–99)
GLUCOSE BLDC GLUCOMTR-MCNC: 223 MG/DL — HIGH (ref 70–99)
GLUCOSE SERPL-MCNC: 233 MG/DL — HIGH (ref 70–99)
HCT VFR BLD CALC: 37 % — SIGNIFICANT CHANGE UP (ref 34.5–45)
HGB BLD-MCNC: 11.2 G/DL — LOW (ref 11.5–15.5)
MCHC RBC-ENTMCNC: 20.8 PG — LOW (ref 27–34)
MCHC RBC-ENTMCNC: 30.3 GM/DL — LOW (ref 32–36)
MCV RBC AUTO: 68.8 FL — LOW (ref 80–100)
NRBC # BLD: 0 /100 WBCS — SIGNIFICANT CHANGE UP (ref 0–0)
PLATELET # BLD AUTO: 183 K/UL — SIGNIFICANT CHANGE UP (ref 150–400)
POTASSIUM SERPL-MCNC: 4.2 MMOL/L — SIGNIFICANT CHANGE UP (ref 3.5–5.3)
POTASSIUM SERPL-SCNC: 4.2 MMOL/L — SIGNIFICANT CHANGE UP (ref 3.5–5.3)
RBC # BLD: 5.38 M/UL — HIGH (ref 3.8–5.2)
RBC # FLD: 23 % — HIGH (ref 10.3–14.5)
SODIUM SERPL-SCNC: 140 MMOL/L — SIGNIFICANT CHANGE UP (ref 135–145)
WBC # BLD: 9.86 K/UL — SIGNIFICANT CHANGE UP (ref 3.8–10.5)
WBC # FLD AUTO: 9.86 K/UL — SIGNIFICANT CHANGE UP (ref 3.8–10.5)

## 2024-07-08 PROCEDURE — 86850 RBC ANTIBODY SCREEN: CPT

## 2024-07-08 PROCEDURE — 85025 COMPLETE CBC W/AUTO DIFF WBC: CPT

## 2024-07-08 PROCEDURE — 84295 ASSAY OF SERUM SODIUM: CPT

## 2024-07-08 PROCEDURE — 82962 GLUCOSE BLOOD TEST: CPT

## 2024-07-08 PROCEDURE — 96374 THER/PROPH/DIAG INJ IV PUSH: CPT

## 2024-07-08 PROCEDURE — 84439 ASSAY OF FREE THYROXINE: CPT

## 2024-07-08 PROCEDURE — 83735 ASSAY OF MAGNESIUM: CPT

## 2024-07-08 PROCEDURE — 36415 COLL VENOUS BLD VENIPUNCTURE: CPT

## 2024-07-08 PROCEDURE — 82435 ASSAY OF BLOOD CHLORIDE: CPT

## 2024-07-08 PROCEDURE — 83880 ASSAY OF NATRIURETIC PEPTIDE: CPT

## 2024-07-08 PROCEDURE — 99285 EMERGENCY DEPT VISIT HI MDM: CPT

## 2024-07-08 PROCEDURE — 87641 MR-STAPH DNA AMP PROBE: CPT

## 2024-07-08 PROCEDURE — 86901 BLOOD TYPING SEROLOGIC RH(D): CPT

## 2024-07-08 PROCEDURE — 80053 COMPREHEN METABOLIC PANEL: CPT

## 2024-07-08 PROCEDURE — 86900 BLOOD TYPING SEROLOGIC ABO: CPT

## 2024-07-08 PROCEDURE — 97162 PT EVAL MOD COMPLEX 30 MIN: CPT

## 2024-07-08 PROCEDURE — 87640 STAPH A DNA AMP PROBE: CPT

## 2024-07-08 PROCEDURE — 96375 TX/PRO/DX INJ NEW DRUG ADDON: CPT

## 2024-07-08 PROCEDURE — 85027 COMPLETE CBC AUTOMATED: CPT

## 2024-07-08 PROCEDURE — 83036 HEMOGLOBIN GLYCOSYLATED A1C: CPT

## 2024-07-08 PROCEDURE — 71045 X-RAY EXAM CHEST 1 VIEW: CPT

## 2024-07-08 PROCEDURE — 85018 HEMOGLOBIN: CPT

## 2024-07-08 PROCEDURE — 85384 FIBRINOGEN ACTIVITY: CPT

## 2024-07-08 PROCEDURE — 82330 ASSAY OF CALCIUM: CPT

## 2024-07-08 PROCEDURE — 81003 URINALYSIS AUTO W/O SCOPE: CPT

## 2024-07-08 PROCEDURE — 99221 1ST HOSP IP/OBS SF/LOW 40: CPT

## 2024-07-08 PROCEDURE — 83605 ASSAY OF LACTIC ACID: CPT

## 2024-07-08 PROCEDURE — 82947 ASSAY GLUCOSE BLOOD QUANT: CPT

## 2024-07-08 PROCEDURE — 85520 HEPARIN ASSAY: CPT

## 2024-07-08 PROCEDURE — 80048 BASIC METABOLIC PNL TOTAL CA: CPT

## 2024-07-08 PROCEDURE — 85730 THROMBOPLASTIN TIME PARTIAL: CPT

## 2024-07-08 PROCEDURE — 94640 AIRWAY INHALATION TREATMENT: CPT

## 2024-07-08 PROCEDURE — 82553 CREATINE MB FRACTION: CPT

## 2024-07-08 PROCEDURE — 84443 ASSAY THYROID STIM HORMONE: CPT

## 2024-07-08 PROCEDURE — 84100 ASSAY OF PHOSPHORUS: CPT

## 2024-07-08 PROCEDURE — 85610 PROTHROMBIN TIME: CPT

## 2024-07-08 PROCEDURE — 82550 ASSAY OF CK (CPK): CPT

## 2024-07-08 PROCEDURE — 83690 ASSAY OF LIPASE: CPT

## 2024-07-08 PROCEDURE — 82803 BLOOD GASES ANY COMBINATION: CPT

## 2024-07-08 PROCEDURE — ZZZZZ: CPT

## 2024-07-08 PROCEDURE — 84484 ASSAY OF TROPONIN QUANT: CPT

## 2024-07-08 PROCEDURE — 93306 TTE W/DOPPLER COMPLETE: CPT

## 2024-07-08 PROCEDURE — 85014 HEMATOCRIT: CPT

## 2024-07-08 PROCEDURE — 84132 ASSAY OF SERUM POTASSIUM: CPT

## 2024-07-08 RX ORDER — LABETALOL HYDROCHLORIDE 200 MG/1
1 TABLET, FILM COATED ORAL
Qty: 90 | Refills: 0 | DISCHARGE
Start: 2024-07-08 | End: 2024-08-06

## 2024-07-08 RX ORDER — LEVETIRACETAM 100 MG/ML
2 INJECTION INTRAVENOUS
Qty: 120 | Refills: 0
Start: 2024-07-08 | End: 2024-08-06

## 2024-07-08 RX ORDER — MONTELUKAST SODIUM 10 MG/1
1 TABLET, FILM COATED ORAL
Qty: 0 | Refills: 0 | DISCHARGE
Start: 2024-07-08

## 2024-07-08 RX ORDER — HYDRALAZINE HYDROCHLORIDE 50 MG/1
1 TABLET ORAL
Qty: 90 | Refills: 0
Start: 2024-07-08 | End: 2024-08-06

## 2024-07-08 RX ORDER — NIFEDIPINE 30 MG
1 TABLET, EXTENDED RELEASE 24 HR ORAL
Qty: 30 | Refills: 0 | DISCHARGE
Start: 2024-07-08 | End: 2024-08-06

## 2024-07-08 RX ORDER — CLOPIDOGREL BISULFATE 75 MG/1
1 TABLET, FILM COATED ORAL
Qty: 30 | Refills: 0
Start: 2024-07-08 | End: 2024-08-06

## 2024-07-08 RX ORDER — ATORVASTATIN CALCIUM 20 MG/1
1 TABLET, FILM COATED ORAL
Qty: 0 | Refills: 0 | DISCHARGE
Start: 2024-07-08

## 2024-07-08 RX ORDER — GABAPENTIN
1 POWDER (GRAM) MISCELLANEOUS
Qty: 0 | Refills: 0 | DISCHARGE
Start: 2024-07-08

## 2024-07-08 RX ORDER — LABETALOL HYDROCHLORIDE 300 MG/1
1 TABLET ORAL
Qty: 90 | Refills: 0
Start: 2024-07-08 | End: 2024-08-06

## 2024-07-08 RX ORDER — MEXILETINE HYDROCHLORIDE 250 MG/1
1 CAPSULE ORAL
Qty: 0 | Refills: 0 | DISCHARGE
Start: 2024-07-08

## 2024-07-08 RX ORDER — METHYLPREDNISOLONE ACETATE 20 MG/ML
3 VIAL (ML) INJECTION
Qty: 0 | Refills: 0 | DISCHARGE
Start: 2024-07-08

## 2024-07-08 RX ORDER — CLOPIDOGREL BISULFATE 75 MG/1
1 TABLET, FILM COATED ORAL
Qty: 30 | Refills: 0 | DISCHARGE
Start: 2024-07-08 | End: 2024-08-06

## 2024-07-08 RX ORDER — SENNOSIDES 8.6 MG
2 TABLET ORAL
Qty: 0 | Refills: 0 | DISCHARGE
Start: 2024-07-08

## 2024-07-08 RX ORDER — PANTOPRAZOLE SODIUM 40 MG/10ML
1 INJECTION, POWDER, FOR SOLUTION INTRAVENOUS
Qty: 30 | Refills: 0
Start: 2024-07-08 | End: 2024-08-06

## 2024-07-08 RX ADMIN — HEPARIN SODIUM 5000 UNIT(S): 50 INJECTION, SOLUTION INTRAVENOUS at 06:46

## 2024-07-08 RX ADMIN — Medication 500 MILLIGRAM(S): at 11:54

## 2024-07-08 RX ADMIN — PANTOPRAZOLE SODIUM 40 MILLIGRAM(S): 40 INJECTION, POWDER, FOR SOLUTION INTRAVENOUS at 06:43

## 2024-07-08 RX ADMIN — IPRATROPIUM BROMIDE AND ALBUTEROL SULFATE 3 MILLILITER(S): .5; 3 SOLUTION RESPIRATORY (INHALATION) at 06:45

## 2024-07-08 RX ADMIN — Medication 100 MILLIGRAM(S): at 13:45

## 2024-07-08 RX ADMIN — IPRATROPIUM BROMIDE AND ALBUTEROL SULFATE 3 MILLILITER(S): .5; 3 SOLUTION RESPIRATORY (INHALATION) at 11:53

## 2024-07-08 RX ADMIN — Medication 30 MILLIGRAM(S): at 06:45

## 2024-07-08 RX ADMIN — INSULIN LISPRO 9 UNIT(S): 100 INJECTION, SOLUTION SUBCUTANEOUS at 07:39

## 2024-07-08 RX ADMIN — Medication 12 MILLIGRAM(S): at 06:45

## 2024-07-08 RX ADMIN — MUPIROCIN 1 APPLICATION(S): 20 OINTMENT TOPICAL at 06:45

## 2024-07-08 RX ADMIN — Medication 1 TABLET(S): at 11:54

## 2024-07-08 RX ADMIN — INSULIN LISPRO 2: 100 INJECTION, SOLUTION SUBCUTANEOUS at 07:38

## 2024-07-08 RX ADMIN — Medication 3 MILLILITER(S): at 14:01

## 2024-07-08 RX ADMIN — LABETALOL HYDROCHLORIDE 100 MILLIGRAM(S): 300 TABLET ORAL at 03:43

## 2024-07-08 RX ADMIN — LABETALOL HYDROCHLORIDE 100 MILLIGRAM(S): 300 TABLET ORAL at 06:44

## 2024-07-08 RX ADMIN — Medication 100 MILLIGRAM(S): at 06:44

## 2024-07-08 RX ADMIN — Medication 3 MILLILITER(S): at 06:56

## 2024-07-08 RX ADMIN — INSULIN LISPRO 9 UNIT(S): 100 INJECTION, SOLUTION SUBCUTANEOUS at 11:45

## 2024-07-08 RX ADMIN — MONTELUKAST SODIUM 10 MILLIGRAM(S): 10 TABLET, FILM COATED ORAL at 11:54

## 2024-07-08 RX ADMIN — HYDRALAZINE HYDROCHLORIDE 25 MILLIGRAM(S): 50 TABLET ORAL at 06:44

## 2024-07-08 RX ADMIN — MEXILETINE HYDROCHLORIDE 200 MILLIGRAM(S): 250 CAPSULE ORAL at 13:42

## 2024-07-08 RX ADMIN — MEXILETINE HYDROCHLORIDE 200 MILLIGRAM(S): 250 CAPSULE ORAL at 06:44

## 2024-07-08 RX ADMIN — HEPARIN SODIUM 5000 UNIT(S): 50 INJECTION, SOLUTION INTRAVENOUS at 13:42

## 2024-07-08 RX ADMIN — Medication 2 PUFF(S): at 11:55

## 2024-07-08 RX ADMIN — LEVETIRACETAM 1000 MILLIGRAM(S): 100 INJECTION INTRAVENOUS at 06:43

## 2024-07-08 RX ADMIN — TIOTROPIUM BROMIDE 2 PUFF(S): 18 CAPSULE ORAL; RESPIRATORY (INHALATION) at 11:55

## 2024-07-08 RX ADMIN — HYDRALAZINE HYDROCHLORIDE 25 MILLIGRAM(S): 50 TABLET ORAL at 13:43

## 2024-07-08 NOTE — PROGRESS NOTE ADULT - REASON FOR ADMISSION
Chest pressure
type B dx
Chest pressure

## 2024-07-08 NOTE — PROGRESS NOTE ADULT - PROBLEM SELECTOR PROBLEM 2
2019 novel coronavirus disease (COVID-19)
Chest pressure
DM2 (diabetes mellitus, type 2)
Chest pressure
Chest pressure

## 2024-07-08 NOTE — DISCHARGE NOTE PROVIDER - NSDCPNSUBOBJ_GEN_ALL_CORE
General: NAD  HEENT:  NC/AT  Neuro: A&Ox4, gait steady, speech clear, no focal deficits noted  Respiratory: B/L BS CTA, no wheeze, no rhonchi, no crackles noted  Cardiovascular: RRR, normal S1S2, no murmur noted  GI: Abd soft, NT/ND, +BSx4Q +BM  Peripheral Vascular:  B/L LE neg edema, 2+ peripheral pulses, no clubbing, cyanosis, varicosities/PVD noted  Musculoskeletal: B/L UE and LE 5/5 strength   Psychiatric: Normal mood, normal affect observed  Skin: Normal exam to inspection and palpation. no bleeding, no hematoma. General: NAD  HEENT:  NC/AT  Neuro: A&Ox4, gait steady, speech clear, no focal deficits noted  Respiratory: B/L BS CTA, no wheeze, no rhonchi, no crackles noted  Cardiovascular: SB/ RSR 55-75, normal S1S2, no murmur noted  GI: Abd soft, NT/ND, +BSx4Q +BM  Peripheral Vascular:  B/L LE neg edema, 2+ peripheral pulses, no clubbing, cyanosis, varicosities/PVD noted  Musculoskeletal: B/L UE and LE 5/5 strength   Psychiatric: Normal mood, normal affect observed  Skin: Normal exam to inspection and palpation. no bleeding, no hematoma.    discharge pt home today 7/8 as per Dr. GRAF; f/u with ct surgery aortic registry

## 2024-07-08 NOTE — DISCHARGE NOTE PROVIDER - REASON FOR ADMISSION
7/3 chronic type b dissection Chest pressure Chronic type B aortic dissection  chronic type b aortic dissection

## 2024-07-08 NOTE — DISCHARGE NOTE NURSING/CASE MANAGEMENT/SOCIAL WORK - NSDCPEFALRISK_GEN_ALL_CORE
For information on Fall & Injury Prevention, visit: [email or web address] OR  [email or web address] OR  https://www.cdc.gov/steadi/patient.html

## 2024-07-08 NOTE — DISCHARGE NOTE PROVIDER - HOSPITAL COURSE
72 yo PMHx of Type A Aortic Dissection Repair (Bentall, Hemiarch, LAAL in 9/2022), s/p TAVR (9/2023, size 23 Telma Tristen), asthma on chronic steroids, bronchiectasis s/p surgery, allergic rhinitis,  recurrent PNA, PND, tracheomalacia s/p tracheoplasty, ESBL proteus infections (sputum),  diabetes, paroxysmal A-fib on Eliquis, colorectal cancer many years ago status post colostomy presents as a transfer from [facility] for type B aortic dissection,   originally presented to [facility] for crushing chest pain described as a heaviness in her ches, t endorses shortness of breath  .  Patient received a CT angio of the chest abdomen pelvis at the outside hospital which showed a type B aortic dissection with great vessel involvement. no sign of false lumen thrombosis or true lumen compression.  Previous aortic valve replacement and aortic root replacement seen.    Previous AP resection of the rectum and sigmoid colon.  Patient denies any chest pain at this time.  States she had a couple episodes of vomiting yesterday.  Patient was also found to be COVID-positive incidentally.  EMS vitals on arrival were 124/74,  labetalol drip initiated  .   7/3   CTU  Insulin Gtt,    No EKG changes  BP control  7/6    2 malone     NSR   w brief acc jun,   + covid isolation ,Nifedipine  30 qd    hydral 25 q8,   labetolol 100 q8   prednisone 12MG QD  7/7 vss vascular following, blood pressure regimen maintained     72 yo PMHx of Type A Aortic Dissection Repair (Bentall, Hemiarch, LAAL in 9/2022), s/p TAVR (9/2023, size 23 Telma Tristen), asthma on chronic steroids, bronchiectasis s/p surgery, allergic rhinitis,  recurrent PNA, PND, tracheomalacia s/p tracheoplasty, ESBL proteus infections (sputum),  diabetes, paroxysmal A-fib on Eliquis, colorectal cancer many years ago status post colostomy presents as a transfer from [facility] for type B aortic dissection,   originally presented to [facility] for crushing chest pain described as a heaviness in her ches, t endorses shortness of breath  .  Patient received a CT angio of the chest abdomen pelvis at the outside hospital which showed a type B aortic dissection with great vessel involvement. no sign of false lumen thrombosis or true lumen compression.  Previous aortic valve replacement and aortic root replacement seen.    Previous AP resection of the rectum and sigmoid colon.  Patient denies any chest pain at this time.  States she had a couple episodes of vomiting yesterday.  Patient was also found to be COVID-positive incidentally.  EMS vitals on arrival were 124/74,  labetalol drip initiated  .   7/3   CTU  Insulin Gtt,    No EKG changes  BP control  7/6    2 malone     NSR   w brief acc jun,   + covid isolation ,Nifedipine  30 qd    hydral 25 q8,   labetolol 100 q8   prednisone 12MG QD  7/7 vss vascular following, blood pressure regimen maintained  7/8 VSS; RSR/SB 55-75; home insulin dosages adjusted as per Endo; discharge pt home today as per Dr. Lozada; pt to continue to follow with the CT surgery office Aortic registry      72 yo PMHx of Type A Aortic Dissection Repair (Bentall, Hemiarch, LAAL in 9/2022), s/p TAVR (9/2023, size 23 Telma Tristen), asthma on chronic steroids, bronchiectasis s/p surgery, allergic rhinitis,  recurrent PNA, PND, tracheomalacia s/p tracheoplasty, ESBL proteus infections (sputum),  diabetes, paroxysmal A-fib on Eliquis, colorectal cancer many years ago status post colostomy presents as a transfer from [facility] for type B aortic dissection,   originally presented to [facility] for crushing chest pain described as a heaviness in her ches, t endorses shortness of breath  .  Patient received a CT angio of the chest abdomen pelvis at the outside hospital which showed a type B aortic dissection with great vessel involvement. no sign of false lumen thrombosis or true lumen compression.  Previous aortic valve replacement and aortic root replacement seen.    Previous AP resection of the rectum and sigmoid colon.  Patient denies any chest pain at this time.  States she had a couple episodes of vomiting yesterday.  Patient was also found to be COVID-positive incidentally.  EMS vitals on arrival were 124/74,  labetalol drip initiated  .   7/3   CTU  Insulin Gtt,    No EKG changes  BP control  7/6    2 malone     NSR   w brief acc jun,   + covid isolation ,Nifedipine  30 qd    hydral 25 q8,   labetolol 100 q8   prednisone 12MG QD  7/7 vss vascular following, blood pressure regimen maintained  7/8 VSS; RSR/SB 55-75; htn medication adjusted; bp controlled today sbp 120's; home insulin dosages adjusted as per Endo; discharge pt home today as per Dr. Lozada; pt to continue to follow with the CT surgery office Aortic registry   resume plavix and eliquis as per Dr. Lozada

## 2024-07-08 NOTE — DISCHARGE NOTE PROVIDER - CARE PROVIDER_API CALL
Marta Lozada  Thoracic and Cardiac Surgery  [address] [place] [address]  Phone: [phone]  Fax: [phone]  Follow Up Time:    Marta Lozada  Thoracic and Cardiac Surgery  [address] [place] [address]  Phone: [phone]  Fax: [phone]  Follow Up Time:     Eulalio Allison  Pulmonary Disease  [address] [facility] [address]  [place] [address]  Phone: [phone]  Fax: [phone]  Follow Up Time:     Tan Cabrera  Thoracic and Cardiac Surgery  [address] [place] [address]  Phone: [phone]  Fax: [phone]  Follow Up Time:

## 2024-07-08 NOTE — CONSULT NOTE ADULT - CONSULT REASON
sacral/bilateral buttocks skin injury
Type B aortic dissection, r/o ACS
Type B aortic dissection
DM2
Dyspnea

## 2024-07-08 NOTE — DISCHARGE NOTE PROVIDER - NSDCFUADDINST_GEN_ALL_CORE_FT
follow up with Dr. Cabrera in cardiac surgery office - aortic registry in 1 month- office to call and schedule appointment.

## 2024-07-08 NOTE — PROGRESS NOTE ADULT - PROVIDER SPECIALTY LIST ADULT
Critical Care
Critical Care
Vascular Surgery
Critical Care
Endocrinology
Vascular Surgery
CT Surgery
Critical Care
Critical Care
Vascular Surgery
Endocrinology
Endocrinology
CT Surgery

## 2024-07-08 NOTE — PROGRESS NOTE ADULT - SUBJECTIVE AND OBJECTIVE BOX
Chief complaint    Patient is a 73y old  Female who presents with a chief complaint of Chest pressure (08 Jul 2024 05:14)   Review of systems  Patient appears comfortable.    Labs and Fingersticks  CAPILLARY BLOOD GLUCOSE      POCT Blood Glucose.: 223 mg/dL (08 Jul 2024 07:31)  POCT Blood Glucose.: 161 mg/dL (07 Jul 2024 21:20)  POCT Blood Glucose.: 141 mg/dL (07 Jul 2024 16:29)  POCT Blood Glucose.: 143 mg/dL (07 Jul 2024 11:52)      Anion Gap: 10 (07-08 @ 05:52)  Anion Gap: 12 (07-07 @ 07:00)      Calcium: 9.4 (07-08 @ 05:52)  Calcium: 8.9 (07-07 @ 07:00)          07-08    140  |  101  |  22  ----------------------------<  233<H>  4.2   |  29  |  0.73    Ca    9.4      08 Jul 2024 05:52                          11.2   9.86  )-----------( 183      ( 08 Jul 2024 05:52 )             37.0     Medications  MEDICATIONS  (STANDING):  albuterol/ipratropium for Nebulization 3 milliLiter(s) Nebulizer every 6 hours  ascorbic acid 500 milliGRAM(s) Oral daily  atorvastatin 20 milliGRAM(s) Oral at bedtime  budesonide 160 MICROgram(s)/formoterol 4.5 MICROgram(s) Inhaler 2 Puff(s) Inhalation daily  dextrose 50% Injectable 50 milliLiter(s) IV Push every 15 minutes  gabapentin 100 milliGRAM(s) Oral every 8 hours  heparin   Injectable 5000 Unit(s) SubCutaneous every 8 hours  hydrALAZINE 25 milliGRAM(s) Oral every 8 hours  insulin glargine Injectable (LANTUS) 30 Unit(s) SubCutaneous at bedtime  insulin lispro (ADMELOG) corrective regimen sliding scale   SubCutaneous three times a day before meals  insulin lispro (ADMELOG) corrective regimen sliding scale   SubCutaneous at bedtime  insulin lispro Injectable (ADMELOG) 9 Unit(s) SubCutaneous three times a day before meals  labetalol 100 milliGRAM(s) Oral every 8 hours  levETIRAcetam 1000 milliGRAM(s) Oral two times a day  methylPREDNISolone 12 milliGRAM(s) Oral daily  mexiletine 200 milliGRAM(s) Oral every 8 hours  montelukast 10 milliGRAM(s) Oral daily  multivitamin 1 Tablet(s) Oral daily  mupirocin 2% Nasal 1 Application(s) Both Nostrils every 12 hours  NIFEdipine XL 30 milliGRAM(s) Oral daily  pantoprazole    Tablet 40 milliGRAM(s) Oral before breakfast  polyethylene glycol 3350 17 Gram(s) Oral daily  senna 2 Tablet(s) Oral at bedtime  sodium chloride 0.9% lock flush 3 milliLiter(s) IV Push every 8 hours  tiotropium 2.5 MICROgram(s) Inhaler 2 Puff(s) Inhalation daily      Physical Exam  General: Patient appears comfortable.  Vital Signs Last 12 Hrs  T(F): 99.5 (07-08-24 @ 05:35), Max: 99.5 (07-08-24 @ 05:35)  HR: 62 (07-08-24 @ 08:01) (62 - 65)  BP: 125/68 (07-08-24 @ 05:35) (125/68 - 125/68)  BP(mean): --  RR: 18 (07-08-24 @ 05:35) (18 - 18)  SpO2: 94% (07-08-24 @ 05:35) (94% - 94%)  Neck: No palpable thyroid nodules.  CVS: S1S2, No murmurs  Respiratory: No wheezing, no crepitations  GI: Abdomen soft, non tender.    Diagnostics        Radiology:            Chief complaint    Patient is a 73y old  Female who presents with a chief complaint of Chest pressure (08 Jul 2024 05:14)   Review of systems  Patient appears comfortable.    Labs and Fingersticks  CAPILLARY BLOOD GLUCOSE    POCT Blood Glucose.: 223 mg/dL (08 Jul 2024 07:31)  POCT Blood Glucose.: 161 mg/dL (07 Jul 2024 21:20)  POCT Blood Glucose.: 141 mg/dL (07 Jul 2024 16:29)  POCT Blood Glucose.: 143 mg/dL (07 Jul 2024 11:52)      Anion Gap: 10 (07-08 @ 05:52)  Anion Gap: 12 (07-07 @ 07:00)      Calcium: 9.4 (07-08 @ 05:52)  Calcium: 8.9 (07-07 @ 07:00)          07-08    140  |  101  |  22  ----------------------------<  233<H>  4.2   |  29  |  0.73    Ca    9.4      08 Jul 2024 05:52                          11.2   9.86  )-----------( 183      ( 08 Jul 2024 05:52 )             37.0     Medications  MEDICATIONS  (STANDING):  albuterol/ipratropium for Nebulization 3 milliLiter(s) Nebulizer every 6 hours  ascorbic acid 500 milliGRAM(s) Oral daily  atorvastatin 20 milliGRAM(s) Oral at bedtime  budesonide 160 MICROgram(s)/formoterol 4.5 MICROgram(s) Inhaler 2 Puff(s) Inhalation daily  dextrose 50% Injectable 50 milliLiter(s) IV Push every 15 minutes  gabapentin 100 milliGRAM(s) Oral every 8 hours  heparin   Injectable 5000 Unit(s) SubCutaneous every 8 hours  hydrALAZINE 25 milliGRAM(s) Oral every 8 hours  insulin glargine Injectable (LANTUS) 30 Unit(s) SubCutaneous at bedtime  insulin lispro (ADMELOG) corrective regimen sliding scale   SubCutaneous three times a day before meals  insulin lispro (ADMELOG) corrective regimen sliding scale   SubCutaneous at bedtime  insulin lispro Injectable (ADMELOG) 9 Unit(s) SubCutaneous three times a day before meals  labetalol 100 milliGRAM(s) Oral every 8 hours  levETIRAcetam 1000 milliGRAM(s) Oral two times a day  methylPREDNISolone 12 milliGRAM(s) Oral daily  mexiletine 200 milliGRAM(s) Oral every 8 hours  montelukast 10 milliGRAM(s) Oral daily  multivitamin 1 Tablet(s) Oral daily  mupirocin 2% Nasal 1 Application(s) Both Nostrils every 12 hours  NIFEdipine XL 30 milliGRAM(s) Oral daily  pantoprazole    Tablet 40 milliGRAM(s) Oral before breakfast  polyethylene glycol 3350 17 Gram(s) Oral daily  senna 2 Tablet(s) Oral at bedtime  sodium chloride 0.9% lock flush 3 milliLiter(s) IV Push every 8 hours  tiotropium 2.5 MICROgram(s) Inhaler 2 Puff(s) Inhalation daily      Physical Exam  General: Patient appears comfortable.  Vital Signs Last 12 Hrs  T(F): 99.5 (07-08-24 @ 05:35), Max: 99.5 (07-08-24 @ 05:35)  HR: 62 (07-08-24 @ 08:01) (62 - 65)  BP: 125/68 (07-08-24 @ 05:35) (125/68 - 125/68)  BP(mean): --  RR: 18 (07-08-24 @ 05:35) (18 - 18)  SpO2: 94% (07-08-24 @ 05:35) (94% - 94%)  Neck: No palpable thyroid nodules.  CVS: S1S2, No murmurs  Respiratory: No wheezing, no crepitations  GI: Abdomen soft, non tender.    Diagnostics        Radiology:

## 2024-07-08 NOTE — PROGRESS NOTE ADULT - ASSESSMENT
Ms. Bloom is a 73 year old woman with multiple medical comorbidities and history of Type A aortic dissection with Bentall procedure presenting with chest pain from outside hospital with concern for Type B dissection.    Recommendations:  - Continue blood pressure management   - No operative intervention at this time   - Management per cardiac surgery team       Vascular Surgery [id number]

## 2024-07-08 NOTE — DISCHARGE NOTE NURSING/CASE MANAGEMENT/SOCIAL WORK - PATIENT PORTAL LINK FT
You can access the FollowMyHealth Patient Portal offered by [facility] by registering at the following website: [email or web address]. By joining [employer]’s FollowMyHealth portal, you will also be able to view your health information using other applications (apps) compatible with our system.

## 2024-07-08 NOTE — PROGRESS NOTE ADULT - NS ATTEND AMEND GEN_ALL_CORE FT
Chart, labs, vitals, radiology reviewed. Above H&P reviewed and edited where appropriate. Agree with history and physical exam. Agree with assessment and plan. I reviewed the overnight course of events and discussed the care with the patient/ family. All the decisions in assessment and plan are made by me.
Chart, labs, vitals, radiology reviewed. Above H&P reviewed and edited where appropriate. Agree with history and physical exam. Agree with assessment and plan. I reviewed the overnight course of events and discussed the care with the patient/ family. All the decisions in assessment and plan are made by me.

## 2024-07-08 NOTE — DISCHARGE NOTE PROVIDER - PROVIDER TOKENS
Has The Growth Been Previously Biopsied?: has been previously biopsied PROVIDER:[TOKEN:[[id number]]] PROVIDER:[TOKEN:[[id number]]],PROVIDER:[TOKEN:[[id number]]],PROVIDER:[TOKEN:[[id number]]]

## 2024-07-08 NOTE — PROGRESS NOTE ADULT - PROBLEM SELECTOR PLAN 3
Pulm following  on Montelukast, DuoNebs, Medrol  on Room Air
Suggest to continue medications, monitoring, FU primary team recommendations.
Suggest to continue medications, monitoring, FU primary team recommendations. .
Suggest to continue medications, monitoring, FU primary team recommendations.

## 2024-07-08 NOTE — DISCHARGE NOTE PROVIDER - NSDCMRMEDTOKEN_GEN_ALL_CORE_FT
Albuterol (Eqv-ProAir HFA) 90 mcg/inh inhalation aerosol: 2 puff(s) inhaled every 4 to 6 hours  ascorbic acid 500 mg oral tablet: 1 tab(s) orally once a day  atorvastatin 20 mg oral tablet: 1 tab(s) orally once a day (at bedtime)  Basaglar KwikPen 100 units/mL subcutaneous solution: 16 unit(s) subcutaneous once a day (at bedtime) and inject 6 units once a day in the morning  Breo Ellipta 200 mcg-25 mcg/inh inhalation powder: 1 puff(s) inhaled once a day  budesonide 0.5 mg/2 mL inhalation suspension: 2 milliliter(s) by nebulizer 2 times a day  clopidogrel 75 mg oral tablet: 1 tab(s) orally once a day  diphenhydrAMINE 50 mg oral capsule: 1 cap(s) orally once a day as needed for  allergy symptoms  Eliquis 5 mg oral tablet: 1 tab(s) orally 2 times a day  gabapentin 100 mg oral capsule: 1 cap(s) orally every 8 hours  HumaLOG KwikPen 100 units/mL injectable solution: injectable 3 times a day Inject as per sliding scale  Incruse Ellipta 62.5 mcg/inh inhalation powder: 1 inhaled once a day  Keppra 500 mg oral tablet: 2 tab(s) orally 2 times a day  mexiletine 200 mg oral capsule: 1 cap(s) orally every 8 hours  montelukast 10 mg oral tablet: 1 tab(s) orally once a day  Multiple Vitamins oral tablet: 1 tab(s) orally once a day  pantoprazole 40 mg oral delayed release tablet: 1 tab(s) orally once a day  senna leaf extract oral tablet: 2 tab(s) orally once a day (at bedtime)  tiotropium 2.5 mcg/inh inhalation aerosol: 2 puff(s) inhaled 4 times a day   Albuterol (Eqv-ProAir HFA) 90 mcg/inh inhalation aerosol: 2 puff(s) inhaled every 4 to 6 hours  ascorbic acid 500 mg oral tablet: 1 tab(s) orally once a day  atorvastatin 20 mg oral tablet: 1 tab(s) orally once a day (at bedtime)  Basaglar KwikPen 100 units/mL subcutaneous solution: 30 unit(s) subcutaneous once a day (at bedtime)  Breo Ellipta 200 mcg-25 mcg/inh inhalation powder: 1 puff(s) inhaled once a day  budesonide 0.5 mg/2 mL inhalation suspension: 2 milliliter(s) by nebulizer 2 times a day  clopidogrel 75 mg oral tablet: 1 tab(s) orally once a day  diphenhydrAMINE 50 mg oral capsule: 1 cap(s) orally once a day as needed for  allergy symptoms  Eliquis 5 mg oral tablet: 1 tab(s) orally 2 times a day  gabapentin 100 mg oral capsule: 1 cap(s) orally every 8 hours  HumaLOG KwikPen 100 units/mL injectable solution: 9 unit(s) subcutaneous 3 times a day take before meals three times a day  hydrALAZINE 25 mg oral tablet: 1 tab(s) orally every 8 hours  Incruse Ellipta 62.5 mcg/inh inhalation powder: 1 inhaled once a day  Keppra 500 mg oral tablet: 2 tab(s) orally 2 times a day  labetalol 100 mg oral tablet: 1 tab(s) orally every 8 hours  methylPREDNISolone 4 mg oral tablet: 3 tab(s) orally once a day  mexiletine 200 mg oral capsule: 1 cap(s) orally every 8 hours  montelukast 10 mg oral tablet: 1 tab(s) orally once a day  Multiple Vitamins oral tablet: 1 tab(s) orally once a day  NIFEdipine 30 mg oral tablet, extended release: 1 tab(s) orally once a day  pantoprazole 40 mg oral delayed release tablet: 1 tab(s) orally once a day  senna leaf extract oral tablet: 2 tab(s) orally once a day (at bedtime)  tiotropium 2.5 mcg/inh inhalation aerosol: 2 puff(s) inhaled 4 times a day   Albuterol (Eqv-ProAir HFA) 90 mcg/inh inhalation aerosol: 2 puff(s) inhaled every 4 to 6 hours  ascorbic acid 500 mg oral tablet: 1 tab(s) orally once a day  atorvastatin 20 mg oral tablet: 1 tab(s) orally once a day (at bedtime)  Basaglar KwikPen 100 units/mL subcutaneous solution: 30 unit(s) subcutaneous once a day (at bedtime)  Breo Ellipta 200 mcg-25 mcg/inh inhalation powder: 1 puff(s) inhaled once a day  budesonide 0.5 mg/2 mL inhalation suspension: 2 milliliter(s) by nebulizer 2 times a day  clopidogrel 75 mg oral tablet: 1 tab(s) orally once a day  diphenhydrAMINE 50 mg oral capsule: 1 cap(s) orally once a day as needed for  allergy symptoms  Eliquis 5 mg oral tablet: 1 tab(s) orally 2 times a day  gabapentin 100 mg oral capsule: 1 cap(s) orally every 8 hours  HumaLOG KwikPen 100 units/mL injectable solution: 9 unit(s) subcutaneous 3 times a day take before meals three times a day  hydrALAZINE 25 mg oral tablet: 1 tab(s) orally every 8 hours  Incruse Ellipta 62.5 mcg/inh inhalation powder: 1 inhaled once a day  Keppra  mg oral tablet, extended release: 2 tab(s) orally 2 times a day  labetalol 100 mg oral tablet: 1 tab(s) orally every 8 hours  methylPREDNISolone 4 mg oral tablet: 3 tab(s) orally once a day  mexiletine 200 mg oral capsule: 1 cap(s) orally every 8 hours  montelukast 10 mg oral tablet: 1 tab(s) orally once a day  Multiple Vitamins oral tablet: 1 tab(s) orally once a day  NIFEdipine 30 mg oral tablet, extended release: 1 tab(s) orally once a day  pantoprazole 40 mg oral delayed release tablet: 1 tab(s) orally once a day  senna leaf extract oral tablet: 2 tab(s) orally once a day (at bedtime)  tiotropium 2.5 mcg/inh inhalation aerosol: 2 puff(s) inhaled 4 times a day

## 2024-07-08 NOTE — PROGRESS NOTE ADULT - PROBLEM SELECTOR PROBLEM 1
DM2 (diabetes mellitus, type 2)
H/O aortic dissection
H/O aortic dissection

## 2024-07-08 NOTE — CONSULT NOTE ADULT - SUBJECTIVE AND OBJECTIVE BOX
Wound Surgery Consult Note:    HPI:  74 yo PMHx of Type A Aortic Dissection Repair (Bentall, Hemiarch, [facility] in 9/2022), s/p TAVR (9/2023, size 23 Telma Tristen), asthma on chronic steroids, bronchiectasis s/p surgery, allergic rhinitis,  recurrent PNA, PND, tracheomalacia s/p tracheoplasty, ESBL proteus infections (sputum),  diabetes, paroxysmal A-fib on Eliquis, colorectal cancer many years ago status post colostomy presents as a transfer from [facility] for type B aortic dissection.  Patient originally presented to [facility] for crushing chest pain described as a heaviness in her chest.  Patient endorses shortness of breath at this time and is not on oxygen at home.  Patient received a CT angio of the chest abdomen pelvis at the outside hospital which showed a type B aortic dissection with great vessel involvement.  All great vessels arise from the true lumen.  There was no sign of false lumen thrombosis or true lumen compression.  Previous aortic valve replacement and aortic root replacement seen.  Mild right middle lobe and right lower lobe bronchiolitis.  1.8 cm pancreatic tail cyst.  Previous AP resection of the rectum and sigmoid colon.  Patient denies any chest pain at this time.  States she had a couple episodes of vomiting yesterday.  Patient was also found to be COVID-positive incidentally.  EMS vitals on arrival were 124/74.  Patient is on a labetalol drip and 0.5.  Pulse is in the 50s. Patient transferred to CTU under Dr. Lozada for further management. 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  H/O pelvic surgery, 5 years ago - s/p fracture  History of tracheomalacia, 2015 - attempted tracheal stenting ([facility])- course complicated by obstruction, respiratory failure, multiple CPR attempts -  stent discontinued; 10/20/2016 Tracheobronchoplasty (Prolene Mesh) performed at [facility] by Dr Zapien  S/P bronchoscopy, 6/5/2018 - [facility] (Dr Zapien) no evidence of tracheobronchomalacia in trachea or bronchial tubes  Rectal bleeding, exam under anesthesia ([facility]) 2/2018  S/P TAVR (transcatheter aortic valve replacement)  S/P aortic valve replacement    REVIEW OF SYSTEMS:  CONSTITUTIONAL: + weakness, no fevers or chills  EYES/ENT: No visual changes;  No vertigo or throat pain   MOUTH: No oral lesion, moist  NECK: No pain or stiffness  RESPIRATORY: No cough, wheezing, hemoptysis; No shortness of breath  CARDIOVASCULAR: No chest pain or palpitations  GASTROINTESTINAL: No abdominal or epigastric pain. No nausea, vomiting, or hematemesis; No diarrhea or constipation. No melena or hematochezia.  GENITOURINARY: No dysuria, frequency or hematuria  NEUROLOGICAL: + weakness  SKIN: No itching, rashes, history of sacral pressure injury  PSYCH: no confusion or altered mental status    MEDICATIONS  (STANDING):  albuterol/ipratropium for Nebulization 3 milliLiter(s) Nebulizer every 6 hours  ascorbic acid 500 milliGRAM(s) Oral daily  atorvastatin 20 milliGRAM(s) Oral at bedtime  budesonide 160 MICROgram(s)/formoterol 4.5 MICROgram(s) Inhaler 2 Puff(s) Inhalation daily  dextrose 50% Injectable 50 milliLiter(s) IV Push every 15 minutes  gabapentin 100 milliGRAM(s) Oral every 8 hours  heparin   Injectable 5000 Unit(s) SubCutaneous every 8 hours  hydrALAZINE 25 milliGRAM(s) Oral every 8 hours  insulin glargine Injectable (LANTUS) 30 Unit(s) SubCutaneous at bedtime  insulin lispro (ADMELOG) corrective regimen sliding scale   SubCutaneous three times a day before meals  insulin lispro (ADMELOG) corrective regimen sliding scale   SubCutaneous at bedtime  insulin lispro Injectable (ADMELOG) 9 Unit(s) SubCutaneous three times a day before meals  labetalol 100 milliGRAM(s) Oral every 8 hours  levETIRAcetam 1000 milliGRAM(s) Oral two times a day  methylPREDNISolone 12 milliGRAM(s) Oral daily  mexiletine 200 milliGRAM(s) Oral every 8 hours  montelukast 10 milliGRAM(s) Oral daily  multivitamin 1 Tablet(s) Oral daily  mupirocin 2% Nasal 1 Application(s) Both Nostrils every 12 hours  NIFEdipine XL 30 milliGRAM(s) Oral daily  pantoprazole    Tablet 40 milliGRAM(s) Oral before breakfast  polyethylene glycol 3350 17 Gram(s) Oral daily  senna 2 Tablet(s) Oral at bedtime  sodium chloride 0.9% lock flush 3 milliLiter(s) IV Push every 8 hours  tiotropium 2.5 MICROgram(s) Inhaler 2 Puff(s) Inhalation daily    MEDICATIONS  (PRN):    Allergies    penicillin (Anaphylaxis)  iodine (Short breath; Swelling)  Valium (Short breath)  OxyContin (Unknown)  meropenem (Unknown)  shellfish (Anaphylaxis)  ampicillin (Unknown)  tetanus toxoid (Short breath)  Percocet (Unknown)  aspirin (Short breath)  Dilaudid (Short breath)  codeine (Short breath)  Avelox (Short breath; Pruritus)  cefepime (Anaphylaxis)    Intolerances    SOCIAL HISTORY:  , Denies smoking, ETOH, drugs    FAMILY HISTORY:  Family history of asthma    Family history of breast cancer (Sibling)    Family history of diabetes mellitus type II    Vital Signs Last 24 Hrs  T(C): 37.5 (08 Jul 2024 05:35), Max: 37.5 (08 Jul 2024 05:35)  T(F): 99.5 (08 Jul 2024 05:35), Max: 99.5 (08 Jul 2024 05:35)  HR: 66 (08 Jul 2024 10:51) (62 - 78)  BP: 125/68 (08 Jul 2024 05:35) (116/69 - 145/76)  BP(mean): --  RR: 18 (08 Jul 2024 10:51) (18 - 18)  SpO2: 96% (08 Jul 2024 10:51) (94% - 98%)    Parameters below as of 08 Jul 2024 10:51  Patient On (Oxygen Delivery Method): room air    Physical Exam:  General: alert, WN  Ophthamology: sclera clear  ENMT: moist mucous membranes, trachea midline  Respiratory: equal chest rise with respirations  Gastrointestinal: soft NT/ND  Neurology: verbal,  following commands  Psych: calm, appropriate  Musculoskeletal: no contractures  Vascular: BLE edema equal  Skin:  Patch of darkened skin in and around the gluteal cleft, Hypopigmented patch to the right of the cleft and slightly abraded patch also on the right side  with TTP in this area, L 5cm x 5cm x 0.1cm, scant serosanguinous drainage  No odor, erythema, increased warmth, tenderness, induration, fluctuance    LABS:  07-08    140  |  101  |  22  ----------------------------<  233<H>  4.2   |  29  |  0.73    Ca    9.4      08 Jul 2024 05:52                            11.2   9.86  )-----------( 183      ( 08 Jul 2024 05:52 )             37.0       Urinalysis Basic - ( 08 Jul 2024 05:52 )    Color: x / Appearance: x / SG: x / pH: x  Gluc: 233 mg/dL / Ketone: x  / Bili: x / Urobili: x   Blood: x / Protein: x / Nitrite: x   Leuk Esterase: x / RBC: x / WBC x   Sq Epi: x / Non Sq Epi: x / Bacteria: x

## 2024-07-08 NOTE — DISCHARGE NOTE PROVIDER - CARE PROVIDERS DIRECT ADDRESSES
,[email or web address]

## 2024-07-08 NOTE — DISCHARGE NOTE PROVIDER - NSDCCPCAREPLAN_GEN_ALL_CORE_FT
PRINCIPAL DISCHARGE DIAGNOSIS  Diagnosis: Descending thoracic aortic dissection  Assessment and Plan of Treatment: shower daily  weigh yourself daily  continue current prescriptions as ordered  increase activity as tolerated   no added salt; low fat; low cholesterol, low salt diet.   follow up with Cardiologist in 1-2 weeks. Call to schedule appointment.  follow up with cardiac surgeon       PRINCIPAL DISCHARGE DIAGNOSIS  Diagnosis: Descending thoracic aortic dissection  Assessment and Plan of Treatment: shower daily  weigh yourself daily  continue current prescriptions as ordered  increase activity as tolerated   no added salt; low fat; low cholesterol, low salt diet.   follow up with Cardiologist in 1-2 weeks. Call to schedule appointment.  follow up with cardiac surgeon, DR. Cabrera in aortic registry next month; Cardiac surgery office to call and schedule appoointment. [phone]  follow up with endocrinologist for diabetic management in 1-2 weeks; call to schedule appointment.  follow up with pulmonologist, Keegan HAM in 2-3 weeks; call to schedule appointment.

## 2024-07-08 NOTE — DISCHARGE NOTE NURSING/CASE MANAGEMENT/SOCIAL WORK - NSDCVIVACCINE_GEN_ALL_CORE_FT
COVID-19, mRNA, LNP-S, PF, 100 mcg/ 0.5 mL dose (Moderna); 06-Dec-2021 17:12; Afshan Lew (RADHA); [employer] [id number] (Exp. Date: 22-Dec-2021); IntraMuscular; Deltoid Left.; 0.25 milliLiter(s);

## 2024-07-08 NOTE — PROGRESS NOTE ADULT - PROBLEM SELECTOR PLAN 1
Cardiac imagings reviewed by CTS Attending  Vascular Following  monitor BP closely  on Procardia XL 30mg qD, Hydralazine 25mg q8hr, Labetalol 100mg q8hr  on Atorvastatin 20qHS  GI/DVT ppx  Case d/w CTS Attending
Will increase Lantus to  28 units at bed time.  Will increase Admelog to 9 units before each meal in addition to Admelog correction scale coverage.  Patient counseled for compliance with consistent low carb diet.  .
Will increase Lantus to  30 units at bed time.  Will increase Admelog to 9 units before each meal in addition to Admelog correction scale coverage.  Patient counseled for compliance with consistent low carb diet.  Was on home insulins prior
Will continue current insulin regimen for now. Will continue monitoring  blood sugars, will Follow up.  Patient counseled for compliance with consistent low carb diet.  .   Was on home insulins prior
LABETOLO 100 Q 8   HYDRALAZINE 25 Q8,    PROCARDIA 30 Q8

## 2024-07-08 NOTE — CONSULT NOTE ADULT - ASSESSMENT
Impression:    Sacral/bilateral Buttocks deep tissue injury present on admission  Incontinence of bowel and bladder  Incontinence Dermatitis    Recommend:  1.) topical therapy: sacral/buttock wound – cleanse with incontinence cleanser, pat dry, apply Neptali ointment BID and PRN for incontinent episodes  2.) Incontinence Management - incontinence cleanser, pads, pericare BID  3.) Maintain on an alternating air with low air loss surface  4.) Turn and reposition Q 2 hours  5.) Nutrition optimization   6.) Offload heels/feet with complete cair air fluidized boots/pillows; ensure that the soles of the feet are not resting on the foot board of the bed.  7.) chair cushion for chair sitting    Care as per medicine. Will not actively follow but will remain available. Please recall for new issues or deterioration.  Upon [facility] [address] [place] [address] [phone]  Thank you for this consult  Olamide Lewis, ALLEY-C, CWOCN via TEAMS   Impression:    Sacral/bilateral Buttocks deep tissue injury present on admission  Incontinence of bladder  Incontinence Dermatitis    Recommend:  1.) topical therapy: sacral/buttock wound – cleanse with incontinence cleanser, pat dry, apply Neptali ointment BID and PRN for incontinent episodes  2.) Incontinence Management - incontinence cleanser, pads, pericare BID  3.) Maintain on an alternating air with low air loss surface  4.) Turn and reposition Q 2 hours  5.) Nutrition optimization   6.) Offload heels/feet with complete cair air fluidized boots/pillows; ensure that the soles of the feet are not resting on the foot board of the bed.  7.) chair cushion for chair sitting    Care as per medicine. Will not actively follow but will remain available. Please recall for new issues or deterioration.  Upon discharge f/u as outpatient at [facility] [address] [place] [address] [phone]  Thank you for this consult  Olamide Lewis, NP-C, CWOCN via TEAMS

## 2024-07-08 NOTE — PROGRESS NOTE ADULT - SUBJECTIVE AND OBJECTIVE BOX
GENERAL SURGERY PROGRESS NOTE    Patient: RAYRAY RODRIGUEZ , 73y ([birth year])Female   MRN: [id number]  Location: [facility] [address]  Visit: 07-03-24 Inpatient  Date: 07-08-24 @ 05:14    Subjective: No acute events overnight. Patient resting comfortably in bed, no complaints.    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      H/O pelvic surgery  5 years ago - s/p fracture      History of tracheomalacia  2015 - attempted tracheal stenting ([facility])- course complicated by obstruction, respiratory failure, multiple CPR attempts -  stent discontinued; 10/20/2016 Tracheobronchoplasty (Prolene Mesh) performed at [facility] by Dr Zapien      S/P bronchoscopy  6/5/2018 - [facility] (Dr Zapien) no evidence of tracheobronchomalacia in trachea or bronchial tubes      Rectal bleeding  exam under anesthesia ([facility]) 2/2018      S/P TAVR (transcatheter aortic valve replacement)      S/P aortic valve replacement          Vitals: T(F): 98.4 (07-07-24 @ 19:55), Max: 99.7 (07-07-24 @ 06:00)  HR: 67 (07-07-24 @ 21:15)  BP: 145/76 (07-07-24 @ 21:15)  RR: 18 (07-07-24 @ 21:15)  SpO2: 97% (07-07-24 @ 21:15)      Diet, Consistent Carbohydrate w/Evening Snack:   Supplement Feeding Modality:  Oral  Glucerna Shake Cans or Servings Per Day:  3       Frequency:  Daily      07-06-24 @ 07:01  -  07-07-24 @ 07:00  --------------------------------------------------------  IN:    Oral Fluid: 905 mL  Total IN: 905 mL    OUT:    Voided (mL): 2625 mL  Total OUT: 2625 mL    Total NET: -1720 mL          PHYSICAL EXAM  General - well-nourished, no acute distress  Lungs - breathing comfortably on nasal cannula   Heart - pulse normal   Abdomen - soft, nontender, nondistended  Extremities - all four extremities are warm, strength and sensation intact in bilateral upper and lower extremities  Pulse exam:  - bilateral axial, brachial, radial, and ulnar pulses palpable  - bilateral femoral, popliteal, and DPs palpable  - bilateral PT signals present      MEDICATIONS  (STANDING):  albuterol/ipratropium for Nebulization 3 milliLiter(s) Nebulizer every 6 hours  ascorbic acid 500 milliGRAM(s) Oral daily  atorvastatin 20 milliGRAM(s) Oral at bedtime  budesonide 160 MICROgram(s)/formoterol 4.5 MICROgram(s) Inhaler 2 Puff(s) Inhalation daily  dextrose 50% Injectable 50 milliLiter(s) IV Push every 15 minutes  gabapentin 100 milliGRAM(s) Oral every 8 hours  heparin   Injectable 5000 Unit(s) SubCutaneous every 8 hours  hydrALAZINE 25 milliGRAM(s) Oral every 8 hours  insulin glargine Injectable (LANTUS) 30 Unit(s) SubCutaneous at bedtime  insulin lispro (ADMELOG) corrective regimen sliding scale   SubCutaneous three times a day before meals  insulin lispro (ADMELOG) corrective regimen sliding scale   SubCutaneous at bedtime  insulin lispro Injectable (ADMELOG) 9 Unit(s) SubCutaneous three times a day before meals  labetalol 100 milliGRAM(s) Oral every 8 hours  levETIRAcetam 1000 milliGRAM(s) Oral two times a day  methylPREDNISolone 12 milliGRAM(s) Oral daily  mexiletine 200 milliGRAM(s) Oral every 8 hours  montelukast 10 milliGRAM(s) Oral daily  multivitamin 1 Tablet(s) Oral daily  mupirocin 2% Nasal 1 Application(s) Both Nostrils every 12 hours  NIFEdipine XL 30 milliGRAM(s) Oral daily  pantoprazole    Tablet 40 milliGRAM(s) Oral before breakfast  polyethylene glycol 3350 17 Gram(s) Oral daily  senna 2 Tablet(s) Oral at bedtime  sodium chloride 0.9% lock flush 3 milliLiter(s) IV Push every 8 hours  tiotropium 2.5 MICROgram(s) Inhaler 2 Puff(s) Inhalation daily    MEDICATIONS  (PRN):      DVT PROPHYLAXIS: SCDs, heparin   Injectable 5000 Unit(s) SubCutaneous every 8 hours    GI PROPHYLAXIS: pantoprazole    Tablet 40 milliGRAM(s) Oral before breakfast    ANTICOAGULATION:   ANTIBIOTICS:       LAB/STUDIES:  CAPILLARY BLOOD GLUCOSE      POCT Blood Glucose.: 161 mg/dL (07 Jul 2024 21:20)  POCT Blood Glucose.: 141 mg/dL (07 Jul 2024 16:29)  POCT Blood Glucose.: 143 mg/dL (07 Jul 2024 11:52)  POCT Blood Glucose.: 172 mg/dL (07 Jul 2024 07:57)                          11.2   9.19  )-----------( 192      ( 07 Jul 2024 06:54 )             38.9     07-07    140  |  102  |  20  ----------------------------<  182<H>  4.7   |  26  |  0.64    Ca    8.9      07 Jul 2024 07:00          Urinalysis Basic - ( 07 Jul 2024 07:00 )    Color: x / Appearance: x / SG: x / pH: x  Gluc: 182 mg/dL / Ketone: x  / Bili: x / Urobili: x   Blood: x / Protein: x / Nitrite: x   Leuk Esterase: x / RBC: x / WBC x   Sq Epi: x / Non Sq Epi: x / Bacteria: x

## 2024-07-08 NOTE — PROGRESS NOTE ADULT - ASSESSMENT
73 year old woman with multiple medical comorbidities and history of Type A aortic dissection with Bentall procedure presenting with chest pain from outside hospital with concern for Type B dissection.      Assessment  DMT2: 73y Female with DM T2 with hyperglycemia, A1C 7.8%, was on insulin at home, now on basal bolus insulin with coverage, blood  Eating meals transitioned to basal bolus insulins. On steroids as well, having hyperglycemias, sugars are within acceptable range.  Type B diss: on medications, stable, monitored. Vasc following.   HTN: on antihypertensive medications, monitored, asymptomatic.  Asthma: on steroids at home. O2 supplementation as needed.     Jayshree Vasquez MD  Cell: [phone]  Office: [phone]              73 year old woman with multiple medical comorbidities and history of Type A aortic dissection with Bentall procedure presenting with chest pain from outside hospital with concern for Type B dissection.        Assessment  DMT2: 73y Female with DM T2 with hyperglycemia, A1C 7.8%, was on insulin at home, now on basal bolus insulin with coverage, blood  Eating meals transitioned to basal bolus insulins. On steroids as well, having hyperglycemias, sugars are within acceptable range.  Type B diss: on medications, stable, monitored. Vasc following.   HTN: on antihypertensive medications, monitored, asymptomatic.  Asthma: on steroids at home. O2 supplementation as needed.     Jayshree Vasquez MD  Cell: [phone]  Office: [phone]

## 2024-07-08 NOTE — DISCHARGE NOTE PROVIDER - NSDCFUSCHEDAPPT_GEN_ALL_CORE_FT
Valentino Nguyen  [facility]  [facility] Amb Surg MOR  Scheduled Appointment: 07/11/2024    Eulalio Allison  [facility]  PULMMED [address]  Scheduled Appointment: 07/12/2024    [facility]  PULED [address]  Scheduled Appointment: 07/12/2024    Moreno Panchal  [facility] Physician [facility]  ENDOCRIN [address]  Scheduled Appointment: 08/21/2024    Young Ibarra  [facility] Physician [facility]  DERM [address]  Scheduled Appointment: 09/10/2024    Emmy Horner  [facility]  NEUROLOGY [address]  Scheduled Appointment: 09/24/2024

## 2024-07-09 PROBLEM — I48.91 UNSPECIFIED ATRIAL FIBRILLATION: Chronic | Status: ACTIVE | Noted: 2024-07-03

## 2024-07-09 PROBLEM — J44.9 CHRONIC OBSTRUCTIVE PULMONARY DISEASE, UNSPECIFIED: Chronic | Status: ACTIVE | Noted: 2024-07-03

## 2024-07-10 ENCOUNTER — OUTPATIENT (OUTPATIENT)
Dept: OUTPATIENT SERVICES | Facility: HOSPITAL | Age: 74
LOS: 1 days | End: 2024-07-10
Payer: MEDICARE

## 2024-07-10 ENCOUNTER — APPOINTMENT (OUTPATIENT)
Dept: INTERNAL MEDICINE | Facility: CLINIC | Age: 74
End: 2024-07-10
Payer: MEDICARE

## 2024-07-10 DIAGNOSIS — Z98.89 UNDEFINED: Chronic | ICD-10-CM

## 2024-07-10 DIAGNOSIS — H05.352 EXOSTOSIS OF LEFT ORBIT: Chronic | ICD-10-CM

## 2024-07-10 DIAGNOSIS — Z87.09 PERSONAL HISTORY OF OTHER DISEASES OF THE RESPIRATORY SYSTEM: Chronic | ICD-10-CM

## 2024-07-10 DIAGNOSIS — Z96.653 PRESENCE OF ARTIFICIAL KNEE JOINT, BILATERAL: Chronic | ICD-10-CM

## 2024-07-10 DIAGNOSIS — Z95.2 PRESENCE OF PROSTHETIC HEART VALVE: Chronic | ICD-10-CM

## 2024-07-10 DIAGNOSIS — K62.5 HEMORRHAGE OF ANUS AND RECTUM: Chronic | ICD-10-CM

## 2024-07-10 DIAGNOSIS — I10 ESSENTIAL (PRIMARY) HYPERTENSION: ICD-10-CM

## 2024-07-10 DIAGNOSIS — Z98.890 OTHER SPECIFIED POSTPROCEDURAL STATES: Chronic | ICD-10-CM

## 2024-07-10 PROCEDURE — 99214 OFFICE O/P EST MOD 30 MIN: CPT

## 2024-07-10 PROCEDURE — G0463: CPT

## 2024-07-10 RX ORDER — PAROXETINE HYDROCHLORIDE 10 MG/1
10 TABLET, FILM COATED ORAL
Qty: 30 | Refills: 5 | Status: ACTIVE | COMMUNITY
Start: 2024-07-10 | End: 1900-01-01

## 2024-07-11 ENCOUNTER — APPOINTMENT (OUTPATIENT)
Dept: THORACIC SURGERY | Facility: HOSPITAL | Age: 74
End: 2024-07-11

## 2024-07-11 VITALS — SYSTOLIC BLOOD PRESSURE: 110 MMHG | HEART RATE: 54 BPM | DIASTOLIC BLOOD PRESSURE: 64 MMHG

## 2024-07-12 ENCOUNTER — APPOINTMENT (OUTPATIENT)
Dept: PULMONOLOGY | Facility: CLINIC | Age: 74
End: 2024-07-12

## 2024-07-12 ENCOUNTER — APPOINTMENT (OUTPATIENT)
Dept: PULMONOLOGY | Facility: CLINIC | Age: 74
End: 2024-07-12
Payer: MEDICARE

## 2024-07-12 VITALS
DIASTOLIC BLOOD PRESSURE: 80 MMHG | WEIGHT: 150 LBS | HEIGHT: 67.5 IN | TEMPERATURE: 97.3 F | BODY MASS INDEX: 23.27 KG/M2 | RESPIRATION RATE: 16 BRPM | SYSTOLIC BLOOD PRESSURE: 142 MMHG | OXYGEN SATURATION: 98 % | HEART RATE: 75 BPM

## 2024-07-12 DIAGNOSIS — R06.02 SHORTNESS OF BREATH: ICD-10-CM

## 2024-07-12 DIAGNOSIS — R93.89 ABNORMAL FINDINGS ON DIAGNOSTIC IMAGING OF OTHER SPECIFIED BODY STRUCTURES: ICD-10-CM

## 2024-07-12 DIAGNOSIS — J45.909 UNSPECIFIED ASTHMA, UNCOMPLICATED: ICD-10-CM

## 2024-07-12 DIAGNOSIS — K21.9 GASTRO-ESOPHAGEAL REFLUX DISEASE W/OUT ESOPHAGITIS: ICD-10-CM

## 2024-07-12 DIAGNOSIS — J30.9 ALLERGIC RHINITIS, UNSPECIFIED: ICD-10-CM

## 2024-07-12 DIAGNOSIS — J44.9 CHRONIC OBSTRUCTIVE PULMONARY DISEASE, UNSPECIFIED: ICD-10-CM

## 2024-07-12 DIAGNOSIS — J39.8 OTHER SPECIFIED DISEASES OF UPPER RESPIRATORY TRACT: ICD-10-CM

## 2024-07-12 DIAGNOSIS — E55.9 VITAMIN D DEFICIENCY, UNSPECIFIED: ICD-10-CM

## 2024-07-12 PROCEDURE — ZZZZZ: CPT

## 2024-07-12 PROCEDURE — 90677 PCV20 VACCINE IM: CPT

## 2024-07-12 PROCEDURE — G0009: CPT

## 2024-07-12 PROCEDURE — 99214 OFFICE O/P EST MOD 30 MIN: CPT | Mod: 25

## 2024-07-12 PROCEDURE — 96372 THER/PROPH/DIAG INJ SC/IM: CPT

## 2024-07-12 RX ORDER — SODIUM CHLORIDE SOLN NEBU 7% 7 %
7 NEBU SOLN INHALATION
Qty: 240 | Refills: 5 | Status: ACTIVE | COMMUNITY
Start: 2024-07-12 | End: 1900-01-01

## 2024-07-12 RX ORDER — TEZEPELUMAB-EKKO 210 MG/1.9ML
210 INJECTION, SOLUTION SUBCUTANEOUS
Qty: 0 | Refills: 0 | Status: COMPLETED | OUTPATIENT
Start: 2024-07-12

## 2024-07-15 ENCOUNTER — OUTPATIENT (OUTPATIENT)
Dept: OUTPATIENT SERVICES | Facility: HOSPITAL | Age: 74
LOS: 1 days | End: 2024-07-15
Payer: MEDICARE

## 2024-07-15 ENCOUNTER — APPOINTMENT (OUTPATIENT)
Dept: INTERNAL MEDICINE | Facility: CLINIC | Age: 74
End: 2024-07-15

## 2024-07-15 VITALS — SYSTOLIC BLOOD PRESSURE: 141 MMHG | HEART RATE: 59 BPM | DIASTOLIC BLOOD PRESSURE: 79 MMHG

## 2024-07-15 VITALS
DIASTOLIC BLOOD PRESSURE: 70 MMHG | BODY MASS INDEX: 23.27 KG/M2 | SYSTOLIC BLOOD PRESSURE: 120 MMHG | WEIGHT: 150 LBS | HEIGHT: 67.5 IN | OXYGEN SATURATION: 97 % | HEART RATE: 60 BPM

## 2024-07-15 VITALS — HEART RATE: 60 BPM | SYSTOLIC BLOOD PRESSURE: 140 MMHG | DIASTOLIC BLOOD PRESSURE: 80 MMHG

## 2024-07-15 DIAGNOSIS — Z98.89 OTHER SPECIFIED POSTPROCEDURAL STATES: Chronic | ICD-10-CM

## 2024-07-15 DIAGNOSIS — I10 ESSENTIAL (PRIMARY) HYPERTENSION: ICD-10-CM

## 2024-07-15 DIAGNOSIS — H05.352 EXOSTOSIS OF LEFT ORBIT: Chronic | ICD-10-CM

## 2024-07-15 DIAGNOSIS — Z95.2 PRESENCE OF PROSTHETIC HEART VALVE: Chronic | ICD-10-CM

## 2024-07-15 DIAGNOSIS — Z96.653 PRESENCE OF ARTIFICIAL KNEE JOINT, BILATERAL: Chronic | ICD-10-CM

## 2024-07-15 PROCEDURE — G0463: CPT

## 2024-07-15 PROCEDURE — 99214 OFFICE O/P EST MOD 30 MIN: CPT

## 2024-07-16 ENCOUNTER — RX RENEWAL (OUTPATIENT)
Age: 74
End: 2024-07-16

## 2024-07-16 DIAGNOSIS — I71.9 AORTIC ANEURYSM OF UNSPECIFIED SITE, WITHOUT RUPTURE: ICD-10-CM

## 2024-07-16 PROBLEM — I71.012 DISSECTING ANEURYSM OF THORACIC AORTA, STANFORD TYPE B: Status: ACTIVE | Noted: 2024-07-16

## 2024-07-16 RX ORDER — ATORVASTATIN CALCIUM 20 MG/1
20 TABLET, FILM COATED ORAL
Qty: 1 | Refills: 3 | Status: ACTIVE | COMMUNITY

## 2024-07-16 RX ORDER — HYDRALAZINE HYDROCHLORIDE 25 MG/1
25 TABLET ORAL 3 TIMES DAILY
Qty: 270 | Refills: 3 | Status: ACTIVE | COMMUNITY
Start: 1900-01-01 | End: 1900-01-01

## 2024-07-16 RX ORDER — NIFEDIPINE 30 MG/1
30 TABLET, EXTENDED RELEASE ORAL DAILY
Qty: 90 | Refills: 3 | Status: ACTIVE | COMMUNITY
Start: 1900-01-01 | End: 1900-01-01

## 2024-07-17 RX ORDER — LABETALOL HYDROCHLORIDE 100 MG/1
100 TABLET, FILM COATED ORAL
Qty: 90 | Refills: 1 | Status: ACTIVE | COMMUNITY
Start: 2024-07-17 | End: 1900-01-01

## 2024-07-17 NOTE — DIETITIAN INITIAL EVALUATION ADULT. - PROBLEM SELECTOR PROBLEM 5
Patient contacted and notified that order for Xray of left ribs and chest has been entered by .    Adrenal insufficiency

## 2024-07-18 ENCOUNTER — APPOINTMENT (OUTPATIENT)
Dept: CARDIOTHORACIC SURGERY | Facility: CLINIC | Age: 74
End: 2024-07-18
Payer: MEDICARE

## 2024-07-18 DIAGNOSIS — J45.50 SEVERE PERSISTENT ASTHMA, UNCOMPLICATED: ICD-10-CM

## 2024-07-18 DIAGNOSIS — I10 ESSENTIAL (PRIMARY) HYPERTENSION: ICD-10-CM

## 2024-07-18 DIAGNOSIS — I71.012 DISSECTION OF DESCENDING THORACIC AORTA: ICD-10-CM

## 2024-07-19 PROBLEM — I71.9 AORTIC ANEURYSM: Status: ACTIVE | Noted: 2024-07-11

## 2024-07-22 ENCOUNTER — NON-APPOINTMENT (OUTPATIENT)
Age: 74
End: 2024-07-22

## 2024-07-22 ENCOUNTER — APPOINTMENT (OUTPATIENT)
Dept: CARDIOTHORACIC SURGERY | Facility: CLINIC | Age: 74
End: 2024-07-22
Payer: MEDICARE

## 2024-07-22 VITALS
TEMPERATURE: 98.3 F | HEIGHT: 67.5 IN | DIASTOLIC BLOOD PRESSURE: 61 MMHG | OXYGEN SATURATION: 96 % | BODY MASS INDEX: 23.27 KG/M2 | RESPIRATION RATE: 14 BRPM | HEART RATE: 68 BPM | SYSTOLIC BLOOD PRESSURE: 109 MMHG | WEIGHT: 150 LBS

## 2024-07-22 DIAGNOSIS — E78.5 HYPERLIPIDEMIA, UNSPECIFIED: ICD-10-CM

## 2024-07-22 DIAGNOSIS — F41.9 ANXIETY DISORDER, UNSPECIFIED: ICD-10-CM

## 2024-07-22 DIAGNOSIS — E11.9 TYPE 2 DIABETES MELLITUS W/OUT COMPLICATIONS: ICD-10-CM

## 2024-07-22 LAB — BACTERIA SPT CULT: ABNORMAL

## 2024-07-22 PROCEDURE — 99214 OFFICE O/P EST MOD 30 MIN: CPT

## 2024-07-22 NOTE — REVIEW OF SYSTEMS
[Feeling Tired] : feeling tired [Chest Pain] : chest pain [Palpitations] : palpitations [Shortness Of Breath] : shortness of breath [SOB on Exertion] : shortness of breath during exertion [Dizziness] : dizziness [Negative] : Heme/Lymph

## 2024-07-22 NOTE — END OF VISIT
[FreeTextEntry3] :  I, JIGNESH Ramírez , personally performed the evaluation and management (E/M) services for this established  patient. That E/M includes conducting the initial examination, assessing all conditions, and establishing the plan of care. Today, LEEANNA BETHEA  was here to observe my evaluation and management services for this patient.

## 2024-07-22 NOTE — CONSULT LETTER
[FreeTextEntry2] :  Dr. Rico Glover [FreeTextEntry3] : Tan Cabrera MD  Department of cardiovascular and Thoracic surgery Professor Department of surgery [facility]

## 2024-07-22 NOTE — PHYSICAL EXAM
[Sclera] : the sclera and conjunctiva were normal [PERRL With Normal Accommodation] : pupils were equal in size, round, and reactive to light [Neck Appearance] : the appearance of the neck was normal [] : no respiratory distress [Respiration, Rhythm And Depth] : normal respiratory rhythm and effort [Auscultation Breath Sounds / Voice Sounds] : lungs were clear to auscultation bilaterally [Apical Impulse] : the apical impulse was normal [Heart Rate And Rhythm] : heart rate was normal and rhythm regular [Heart Sounds] : normal S1 and S2 [Murmurs] : no murmurs [Examination Of The Chest] : the chest was normal in appearance [2+] : left 2+ [Breast Appearance] : normal in appearance [Bowel Sounds] : normal bowel sounds [Abdomen Soft] : soft [FreeTextEntry1] : Deferred  [No CVA Tenderness] : no ~M costovertebral angle tenderness [Abnormal Walk] : normal gait [Involuntary Movements] : no involuntary movements were seen [Skin Color & Pigmentation] : normal skin color and pigmentation [Skin Turgor] : normal skin turgor [No Focal Deficits] : no focal deficits [Oriented To Time, Place, And Person] : oriented to person, place, and time [Impaired Insight] : insight and judgment were intact [Affect] : the affect was normal [Mood] : the mood was normal [Memory Recent] : recent memory was not impaired [Memory Remote] : remote memory was not impaired

## 2024-07-22 NOTE — HISTORY OF PRESENT ILLNESS
[FreeTextEntry1] : Ms. RODRIGUEZ is a 72 year old female with recurrent hospitalization and complex medical history including Adrenal Insufficiency, Type I Aortic Dissection s/p Bentall Procedure / Hemiarch Replacement on 9/7/22 with  , Tracheobroncho malacia, Colon Cancer s/p Resection with Colostomy, Paroxysmal A. Fib on Eliquis, TIA, Seizures, DM 2, DVT, COPD, Fungemia on Fluconazole, MRSA on Bactrim, Right Foot Osteomyelitis , AS  9/10/23 TAVR showed Valve-in-valve 26 mm Evolut FX THV in bioprosthetic aortic valve replacement with Dr. Gonsalves/ Dr. Leahy , Asthma on chronic steroids   She was (7/3 to 7/8) transferred from [facility] for type B aortic dissection, originally presented to [facility] for crushing chest pain described as a heaviness in her chest endorses shortness of breath  . Patient received a CT angio of the chest abdomen pelvis at the outside hospital which showed a type B aortic dissection with great vessel involvement. no sign of false lumen thrombosis or true lumen compression.  Previous aortic valve replacement and aortic root replacement seen.  Previous AP resection of the rectum and sigmoid colon.  Patient denies any chest pain at this time.  States she had a couple episodes of vomiting yesterday.  Patient was also found to be COVID-positive incidentally.  EMS vitals on arrival were 124/74,  labetalol drip initiated  .7/6  NSR w brief acc jun, + covid isolation ,Nifedipine  30 qd  hydral 25 q8, labetolol 100 q8 prednisone 12MG QD 7/7 vascular following, blood pressure regimen maintained 7/8 RSR/SB 55-75; htn medication adjusted; bp controlled today sbp 120's; home insulin dosages adjusted as per Endo; discharge pt home today as per Dr. Lozada; pt to continue to follow with the CT surgery office Aortic registry resume plavix and eliquis.    Today she presents and reports that she has shortness of breath but she attributes to asthma, occasional chest pain, palpitations, dizziness and fatigue. Denies any PND or pedal edema.

## 2024-07-22 NOTE — CONSULT LETTER
[FreeTextEntry2] :  Dr. Rico Glover [FreeTextEntry3] : Tan Cabrera MD  Department of cardiovascular and Thoracic surgery Professor Department of surgery [facility]

## 2024-07-22 NOTE — ASSESSMENT
[FreeTextEntry1] :  Ms. RODRIGUEZ is a 72 year old female with recurrent hospitalization and complex medical history including Adrenal Insufficiency, Type I Aortic Dissection s/p Bentall Procedure / Hemiarch Replacement on 9/7/22 with  , Tracheobroncho malacia, Colon Cancer s/p Resection with Colostomy, Paroxysmal A. Fib on Eliquis, TIA, Seizures, DM 2, DVT, COPD, Fungemia on Fluconazole, MRSA on Bactrim, Right Foot Osteomyelitis , AS  9/10/23 TAVR showed Valve-in-valve 26 mm Evolut FX THV in bioprosthetic aortic valve replacement with Dr. Gonsalves/ Dr. Leahy , Asthma on chronic steroids   She was (7/3 to 7/8) transferred from [facility] for type B aortic dissection, originally presented to [facility] for crushing chest pain described as a heaviness in her chest endorses shortness of breath  . Patient received a CT angio of the chest abdomen pelvis at the outside hospital which showed a type B aortic dissection with great vessel involvement. no sign of false lumen thrombosis or true lumen compression.  Previous aortic valve replacement and aortic root replacement seen.  Previous AP resection of the rectum and sigmoid colon.  Patient denies any chest pain at this time.  States she had a couple episodes of vomiting yesterday.  Patient was also found to be COVID-positive incidentally.  EMS vitals on arrival were 124/74,  labetalol drip initiated  .7/6  NSR w brief acc jun, + covid isolation ,Nifedipine  30 qd  hydral 25 q8, labetolol 100 q8 prednisone 12MG QD 7/7 vascular following, blood pressure regimen maintained 7/8 RSR/SB 55-75; htn medication adjusted; bp controlled today sbp 120's; home insulin dosages adjusted as per Endo; discharge pt home today as per Dr. Lozada; pt to continue to follow with the CT surgery office Aortic registry resume plavix and eliquis.    Today she presents and reports that she has shortness of breath but she attributes to asthma. Denies any chest pain, palpitations, dizziness or pedal edema.     I have reviewed the patient's medical records, diagnostic images during the time of this office consultation and have made the following recommendation. The surgical repair is intact and stable.    Plan  1. [facility] with CTA C/A/P . 2. Continue medication regimen. 3. Follow up with cardiologist and PCP. 4. BP control- I have recommended the patient to monitor his blood pressure closely. I have also advised the patient to take daily blood pressures at home and adhere to medication regimen. 5. Discussed signs and symptoms that warrant emergency medical attention

## 2024-07-22 NOTE — DATA REVIEWED
[FreeTextEntry1] :  7/3/24 TTE revealed 1. Left ventricular cavity is normal in size. Left ventricular wall thickness is normal. Left ventricular systolic function is hyperdynamic with an ejection fraction of 75 % by Felipe's method of disks.  2. Normal right ventricular cavity size, with normal wall thickness, and normal right ventricular systolic function. Tricuspid annular plane systolic excursion (TAPSE) is 1.6 cm (normal >=1.7 cm).  3. No pericardial effusion seen.  4. Compared to the transthoracic echocardiogram performed on 9/10/2023, there have been no significant interval changes.  5. There is increased LV mass and concentric hypertrophy. 6. 26 mm Medtronic Evolut FX (valve-in-valve) is present in the aortic position. There is no intravalvular regurgitation. There is no paravalvular regurgitation. There is no aortic valve stenosis. The peak transaortic velocity is 2.61 m/s, peak transaortic gradient is 27.2 mmHg and mean transaortic gradient is 14.0 mmHg with an LVOT/aortic valve VTI ratio of 0.57.    7/2/24 CTA CAP revealed Type B aortic dissection with great vessel involvement . All great vessels arise from the true lumen. There is no signs of false lumen thrombosis or true lumen compression. Previous AVR and aortic root replacement. Mild RML and RLL bronchiolitis. 1.8 cm pancreatic tail cyst. Additional smaller cyst. Previous AP resection of the rectum and sigmoid colon.   ACR white paper guidelines suggest a contrast enhanced, pancreas- protocol abdominal CT or MR in 6 months or endoscopic ultrasound with fine needle aspiration.

## 2024-07-23 NOTE — H&P ADULT - ATTENDING COMMENTS
Physical Therapy   Cardiac Neurodevelopmental Clinic Note  Developmental Assessment    Visit Count: 1     Referred by: PATRICK Strauss  Medical Diagnosis (from order):   Diagnosis Information        Diagnosis    I37.0 (ICD-10-CM) - Pulmonary valve stenosis with doming    Q21.0 (ICD-10-CM) - VSD (ventricular septal defect) (CMD)    Z91.89 (ICD-10-CM) - At risk for hearing loss    Z13.40 (ICD-10-CM) - Encounter for screening for developmental delay                  Treatment Diagnosis: Assessment for developmental concern; impaired muscle strength     Date of Onset: Birth   Chart reviewed for relevant co-morbidities, allergies, tests and medications. See medical chart for most current information.    Age:  32 months 5 days     SUBJECTIVE    Present and reporting subjective information: mother    : no     Reported Current Functional Level: Mother reports that Gokul is very active and likes to climb.  He attends [employer] classes with his sister.  He walked in wearing a sensory/weighted vest.  Mother reports he recently received an autism diagnosis through [facility].      Patient/Family Goals:    Parents will understand their child's current gross motor skills   Following assessment, parents will understand how to help maximize their child's gross motor function  Parents will learn home program based on most current developmental assessment     Current Therapy: Early Intervention: occupational therapy (1x/week) speech therapy (1x/week) developmental therapy (1x/week)   Prior Treatment: Early Intervention: occupation therapy speech therapy developmental therapy in the past year for current condition.   Hospitalization or home health services in the last 30 days: No, per caregiver  Home Environment/Social Support: Patient is a minor and has support at home as needed.    OBJECTIVE     Behavioral Observations:  Distractibility and an increased activity level.     Rico Scales of Infant and  Toddler Development 4th edition (BSID-4) is a standardized assessment used to examine cognition, language, motor, social-emotional and adaptive skills in children ages birth to 42 months, in addition to behavioral skills in an assessment setting. It is not normed on children with a history of congenital heart disease.      The following is a score summary of Gokul's performance on the BSID-4.      BSID-4  Subtest Total Raw Score Scaled Score Standard Score Percentile Rank Age Equivalent   Fine Motor 61 7 -- -- 26 months   Gross Motor  97 8 -- -- 26 months   Total Motor Score  -- 15 84 14 --     Interpretation of test results:  Gokul's standard score of 84 indicates that his motor development is Low Average when compared to other children his age without a congenital heart defect.      Gross Motor Skills  Developmental Milestones:2 Years (24-35 months):   Observed Caregiver Reported Comments   Walks down 3-4 steps without support  x    Catches a large ball  x     Walks with 1 foot on a line for up to 5 feet x     Walks up 3-4 steps without support  x    Jump down 7 inch step x     Jumps forwards 2-6 inches x     \"X\" in observed cell indicates the skill was seen in clinic, X in caregiver reported cell indicates the caregiver present reported observing the skill at home     Posture/Movement Analysis  Gokul was very active.  He likes to climb on furniture and is able to jump down from a chair.  He was able to jump up and forward and walked with one foot on a line.  He runs with emerging coordination.      Neuromotor:   Impaired Not Impaired Comments   Cervical Active Ranges of Motion  x    Cervical Passive Ranges of Motion  x    Trunk Strength  x    Lower Extremity Active Range of Motion  x    Lower Extremity Strength  x    Muscle Tone  x    Comment:  Neuromotor assessment based on observations made during developmental motor activities.     Head Shape  Within normal limits    Home Program:  Encourage \"heavy work\" for  strengthening such as pushing or pulling a laundry basket of toys across the floor, carrying large toys with both hands, pushing or pulling a chair across the kitchen floor, climbing up the slide or rock wall at the park, or wheelbarrow walking across the room or to find puzzle pieces.    Try a \"Beanbag balance\" to practice single leg strengthening: balance a beanbag on a foot while sitting in a small chair; try to stand and balance a beanbag on one foot with hand held support; try to stand on one foot without hand support.  Try to balance a beanbag on one foot and move your foot to drop the beanbag into a bucket.   Play games such as \"[place] says\", \"Ring around the [place]\", \"Duck, Duck, Goose\", \"Follow the Leader\", kick or throw a ball to a sibling, parent, or friend, etc.  Consider a community or park district recreation program such as dancing, gymnastics, soccer, or swimming.  Encourage play at the playground or park, running, walking on uneven surfaces such as grass, woodchips, and sand, swinging on the swings, slides, etc. to increase balance and strength.  Pedal a tricycle.  Toddlers and  children should be active every day for at least 180 minutes (3 hours), in a variety of structured and free play activities. This should be spread out throughout the day and include some time outdoors if possible.  Limit screen and social media time, encourage periods of physical activities for at least 30-60 minutes each day.    Suggestions for next session as indicated: Gokul to be discharged from Cardiac Neurodevelopmental Clinic.      ASSESSMENT   Gokul is a 32 month old male  patient presenting to therapy for a developmental assessment with physical therapy, occupational therapy, and speech therapy in Cardiac Neurodevelopmental clinic. Gokul's motor developmental skills are Low Average for his age. Gokul has the following strengths: he has a supportive family; he is very active; he likes to play and move and explore his  environment; he will be starting school in the fall.  The following developmental/functional challenges were identified in clinic today: his medical diagnoses place him at risk for developmental issues.       Patient will benefit from skilled therapy and Habilitative potential is good based on assessment above.     Result of initial outlined education: Verbalizes understanding    PLAN   Recommendations:  1. Follow up in [facility] at age 3.      2. Continue with current therapies.      Goals: To be obtained by end of this plan of care:  Complete clinic evaluation and make appropriate referrals.- Goal Met   Provide developmental activities for home.- Goal Met    The following skilled interventions to be implemented to achieve above:   No interventions indicated at this time     Frequency/Duration: Patient seen one time for assessment and recommendations.    Patient and caregiver involved in and agreed to plan of care and goals.      Evaluation Procedures:  No evaluation codes were used on this date of service     Timed Procedures:  No timed codes were used on this date of service    Untimed Procedures:  Developmental Testing    Total Treatment Time: 60 minutes    Therapy procedure time and total treatment time can be found documented on the Time Entry flowsheet     Pt seen and examined. I discussed the case with Dr. Casillas and agree. Pt with tracheobronchomalacia s/p tracheobronchoplasty, lung nodules, severe allergic asthma, copd, adrenal insufficiency, bronchiectasis, mod-severe AI and PAF (on Eliquis) presents with intermittent palpitations and dyspnea on exertion over the last few days with a few episodes of bradycardia noted at her pulm rehab facility. [place] overall improved on my evaluation. Minimal end exp wheezing b/l, cardiac exam regular, no LE edema b/l. Etiology likely multifactorial with her COPD/asthma, bradycardic episode, and severe aortic insufficiency likely playing a role. Continue home steroids for adrenal insufficiency and albuterol prn. Will increase steroid dose to treat as an asthma exacerbation if no improvement this AM. Will repeat echo to evaluate aortic insufficiency and look for pulm htn. Stop dilt for now given bradycardic episodes. trend troponin

## 2024-07-24 DIAGNOSIS — A49.8 OTHER BACTERIAL INFECTIONS OF UNSPECIFIED SITE: ICD-10-CM

## 2024-07-24 RX ORDER — BLOOD-GLUCOSE,RECEIVER,CONT
EACH MISCELLANEOUS
Qty: 1 | Refills: 0 | Status: ACTIVE | OUTPATIENT
Start: 2024-07-24

## 2024-07-24 RX ORDER — BLOOD-GLUCOSE SENSOR
EACH MISCELLANEOUS
Qty: 6 | Refills: 3 | Status: ACTIVE | OUTPATIENT
Start: 2024-07-24

## 2024-07-24 RX ORDER — CIPROFLOXACIN HYDROCHLORIDE 500 MG/1
500 TABLET, FILM COATED ORAL TWICE DAILY
Qty: 20 | Refills: 0 | Status: ACTIVE | COMMUNITY
Start: 2024-07-24 | End: 1900-01-01

## 2024-07-25 ENCOUNTER — APPOINTMENT (OUTPATIENT)
Dept: CARDIOLOGY | Facility: CLINIC | Age: 74
End: 2024-07-25
Payer: MEDICARE

## 2024-07-25 ENCOUNTER — NON-APPOINTMENT (OUTPATIENT)
Age: 74
End: 2024-07-25

## 2024-07-25 VITALS
OXYGEN SATURATION: 97 % | DIASTOLIC BLOOD PRESSURE: 67 MMHG | HEART RATE: 65 BPM | RESPIRATION RATE: 18 BRPM | SYSTOLIC BLOOD PRESSURE: 128 MMHG

## 2024-07-25 VITALS
BODY MASS INDEX: 23.27 KG/M2 | SYSTOLIC BLOOD PRESSURE: 146 MMHG | HEART RATE: 69 BPM | HEIGHT: 67.5 IN | DIASTOLIC BLOOD PRESSURE: 75 MMHG | WEIGHT: 150 LBS

## 2024-07-25 DIAGNOSIS — I44.60 UNSPECIFIED FASCICULAR BLOCK: ICD-10-CM

## 2024-07-25 DIAGNOSIS — I71.9 AORTIC ANEURYSM OF UNSPECIFIED SITE, W/OUT RUPTURE: ICD-10-CM

## 2024-07-25 DIAGNOSIS — I35.1 NONRHEUMATIC AORTIC (VALVE) INSUFFICIENCY: ICD-10-CM

## 2024-07-25 DIAGNOSIS — I73.9 PERIPHERAL VASCULAR DISEASE, UNSPECIFIED: ICD-10-CM

## 2024-07-25 DIAGNOSIS — Z98.890 OTHER SPECIFIED POSTPROCEDURAL STATES: ICD-10-CM

## 2024-07-25 DIAGNOSIS — R00.2 PALPITATIONS: ICD-10-CM

## 2024-07-25 DIAGNOSIS — Z51.81 ENCOUNTER FOR THERAPEUTIC DRUG LVL MONITORING: ICD-10-CM

## 2024-07-25 DIAGNOSIS — Z95.3 PRESENCE OF XENOGENIC HEART VALVE: ICD-10-CM

## 2024-07-25 DIAGNOSIS — R06.02 SHORTNESS OF BREATH: ICD-10-CM

## 2024-07-25 DIAGNOSIS — I10 ESSENTIAL (PRIMARY) HYPERTENSION: ICD-10-CM

## 2024-07-25 DIAGNOSIS — I48.91 UNSPECIFIED ATRIAL FIBRILLATION: ICD-10-CM

## 2024-07-25 DIAGNOSIS — Z79.01 ENCOUNTER FOR THERAPEUTIC DRUG LVL MONITORING: ICD-10-CM

## 2024-07-25 PROCEDURE — 93000 ELECTROCARDIOGRAM COMPLETE: CPT

## 2024-07-25 PROCEDURE — G2211 COMPLEX E/M VISIT ADD ON: CPT

## 2024-07-25 PROCEDURE — 99215 OFFICE O/P EST HI 40 MIN: CPT

## 2024-07-26 ENCOUNTER — APPOINTMENT (OUTPATIENT)
Dept: INTERNAL MEDICINE | Facility: CLINIC | Age: 74
End: 2024-07-26

## 2024-07-26 VITALS — OXYGEN SATURATION: 95 % | DIASTOLIC BLOOD PRESSURE: 50 MMHG | SYSTOLIC BLOOD PRESSURE: 122 MMHG | HEART RATE: 56 BPM

## 2024-07-29 RX ORDER — METHYLPREDNISOLONE 8 MG/1
8 TABLET ORAL
Qty: 60 | Refills: 0 | Status: ACTIVE | COMMUNITY
Start: 2024-07-29 | End: 1900-01-01

## 2024-07-30 DIAGNOSIS — K59.00 CONSTIPATION, UNSPECIFIED: ICD-10-CM

## 2024-08-02 NOTE — INPATIENT CERTIFICATION FOR MEDICARE PATIENTS - PHYSICIAN CONCUR
To whom it may concern:    This is to certify that Mechelle Hall is excused from work on Friday August 2 until Sunday August 4, to resume work on Monday August 4.    Please feel free to contact Dr. Mcclure at [phone] with any questions or concerns. Thank you for your assistance in this matter.    Sincerely,           Ophelia Francisco RN  
  Patient verified name and .  Order for consent  found in EHR and matches case posting; patient verifies procedure.   Type 1B surgery, Phone assessment complete.  Orders not received.  Labs per surgeon: none  Labs per anesthesia protocol: SQBS dos (patient drops really low)    Patient answered medical/surgical history questions at their best of ability. All prior to admission medications documented in EPIC.    Patient instructed to continue taking all prescription medications up to the day of surgery but to take only the following medications the day of surgery according to anesthesia guidelines with a small sip of water: Oxycodone IR if needed, Buspirone (Buspar), Albuterol (Ventolin/ Pro-air) use and bring, gabapentin (Neurontin), Zofran (ondansetron) if needed, cabometyx, Pantoprazole (Protonix)  Also, patient is requested to take 2 Tylenol in the morning and then again before bed on the day before surgery. Regular or extra strength may be used.       Patient informed that all vitamins and supplements should be held 7 days prior to surgery and NSAIDS 5 days prior to surgery. Prescription meds to hold:none    Patient instructed on the following:    > Arrive at [facility] Main Entrance, time of arrival to be called the day before by 1700  > NPO after midnight, unless otherwise indicated, including gum, mints, and ice chips  > Responsible adult must drive patient to the hospital, stay during surgery, and patient will need supervision 24 hours after anesthesia  > Use non moisturizing soap in shower the night before surgery and on the morning of surgery  > All piercings must be removed prior to arrival.    > Leave all valuables (money and jewelry) at home but bring insurance card and ID on DOS.   > You may be required to pay a deductible or co-pay on the day of your procedure. You can pre-pay by calling [phone] if your surgery is at the [facility] or [phone] if your surgery is at the [facility].  > Do not 
I concur with the Admission Order and I certify that services are provided in accordance with Section 42 CFR § 412.3

## 2024-08-04 ENCOUNTER — EMERGENCY (EMERGENCY)
Facility: HOSPITAL | Age: 74
LOS: 1 days | Discharge: HOME | End: 2024-08-04
Attending: STUDENT IN AN ORGANIZED HEALTH CARE EDUCATION/TRAINING PROGRAM
Payer: MEDICARE

## 2024-08-04 VITALS
SYSTOLIC BLOOD PRESSURE: 133 MMHG | DIASTOLIC BLOOD PRESSURE: 79 MMHG | OXYGEN SATURATION: 99 % | HEIGHT: 67 IN | WEIGHT: 154.98 LBS | RESPIRATION RATE: 18 BRPM | TEMPERATURE: 98 F | HEART RATE: 66 BPM

## 2024-08-04 DIAGNOSIS — Z98.89 OTHER SPECIFIED POSTPROCEDURAL STATES: Chronic | ICD-10-CM

## 2024-08-04 DIAGNOSIS — K62.5 HEMORRHAGE OF ANUS AND RECTUM: Chronic | ICD-10-CM

## 2024-08-04 DIAGNOSIS — H05.352 EXOSTOSIS OF LEFT ORBIT: Chronic | ICD-10-CM

## 2024-08-04 DIAGNOSIS — Z95.2 PRESENCE OF PROSTHETIC HEART VALVE: Chronic | ICD-10-CM

## 2024-08-04 DIAGNOSIS — Z96.653 PRESENCE OF ARTIFICIAL KNEE JOINT, BILATERAL: Chronic | ICD-10-CM

## 2024-08-04 DIAGNOSIS — Z87.09 PERSONAL HISTORY OF OTHER DISEASES OF THE RESPIRATORY SYSTEM: Chronic | ICD-10-CM

## 2024-08-04 DIAGNOSIS — Z98.890 OTHER SPECIFIED POSTPROCEDURAL STATES: Chronic | ICD-10-CM

## 2024-08-04 LAB
ALBUMIN SERPL ELPH-MCNC: 3.8 G/DL — SIGNIFICANT CHANGE UP (ref 3.3–5.2)
ALP SERPL-CCNC: 70 U/L — SIGNIFICANT CHANGE UP (ref 40–120)
ALT FLD-CCNC: 21 U/L — SIGNIFICANT CHANGE UP
ANION GAP SERPL CALC-SCNC: 10 MMOL/L — SIGNIFICANT CHANGE UP (ref 5–17)
AST SERPL-CCNC: 30 U/L — SIGNIFICANT CHANGE UP
BASOPHILS # BLD AUTO: 0.02 K/UL — SIGNIFICANT CHANGE UP (ref 0–0.2)
BASOPHILS NFR BLD AUTO: 0.2 % — SIGNIFICANT CHANGE UP (ref 0–2)
BILIRUB SERPL-MCNC: <0.2 MG/DL — LOW (ref 0.4–2)
BUN SERPL-MCNC: 20.1 MG/DL — HIGH (ref 8–20)
CALCIUM SERPL-MCNC: 8.5 MG/DL — SIGNIFICANT CHANGE UP (ref 8.4–10.5)
CHLORIDE SERPL-SCNC: 103 MMOL/L — SIGNIFICANT CHANGE UP (ref 96–108)
CO2 SERPL-SCNC: 27 MMOL/L — SIGNIFICANT CHANGE UP (ref 22–29)
CREAT SERPL-MCNC: 0.68 MG/DL — SIGNIFICANT CHANGE UP (ref 0.5–1.3)
EGFR: 91 ML/MIN/1.73M2 — SIGNIFICANT CHANGE UP
EOSINOPHIL # BLD AUTO: 0.02 K/UL — SIGNIFICANT CHANGE UP (ref 0–0.5)
EOSINOPHIL NFR BLD AUTO: 0.2 % — SIGNIFICANT CHANGE UP (ref 0–6)
GLUCOSE SERPL-MCNC: 176 MG/DL — HIGH (ref 70–99)
HCT VFR BLD CALC: 38.8 % — SIGNIFICANT CHANGE UP (ref 34.5–45)
HGB BLD-MCNC: 11.6 G/DL — SIGNIFICANT CHANGE UP (ref 11.5–15.5)
IMM GRANULOCYTES NFR BLD AUTO: 0.7 % — SIGNIFICANT CHANGE UP (ref 0–0.9)
LIDOCAIN IGE QN: 17 U/L — LOW (ref 22–51)
LYMPHOCYTES # BLD AUTO: 1.29 K/UL — SIGNIFICANT CHANGE UP (ref 1–3.3)
LYMPHOCYTES # BLD AUTO: 10.3 % — LOW (ref 13–44)
MCHC RBC-ENTMCNC: 21.3 PG — LOW (ref 27–34)
MCHC RBC-ENTMCNC: 29.9 GM/DL — LOW (ref 32–36)
MCV RBC AUTO: 71.2 FL — LOW (ref 80–100)
MONOCYTES # BLD AUTO: 0.97 K/UL — HIGH (ref 0–0.9)
MONOCYTES NFR BLD AUTO: 7.7 % — SIGNIFICANT CHANGE UP (ref 2–14)
NEUTROPHILS # BLD AUTO: 10.13 K/UL — HIGH (ref 1.8–7.4)
NEUTROPHILS NFR BLD AUTO: 80.9 % — HIGH (ref 43–77)
PLATELET # BLD AUTO: 246 K/UL — SIGNIFICANT CHANGE UP (ref 150–400)
POTASSIUM SERPL-MCNC: 5.2 MMOL/L — SIGNIFICANT CHANGE UP (ref 3.5–5.3)
POTASSIUM SERPL-SCNC: 5.2 MMOL/L — SIGNIFICANT CHANGE UP (ref 3.5–5.3)
PROT SERPL-MCNC: 6.5 G/DL — LOW (ref 6.6–8.7)
RBC # BLD: 5.45 M/UL — HIGH (ref 3.8–5.2)
RBC # FLD: 22.7 % — HIGH (ref 10.3–14.5)
SODIUM SERPL-SCNC: 140 MMOL/L — SIGNIFICANT CHANGE UP (ref 135–145)
WBC # BLD: 12.52 K/UL — HIGH (ref 3.8–10.5)
WBC # FLD AUTO: 12.52 K/UL — HIGH (ref 3.8–10.5)

## 2024-08-04 PROCEDURE — 99285 EMERGENCY DEPT VISIT HI MDM: CPT

## 2024-08-04 PROCEDURE — 74019 RADEX ABDOMEN 2 VIEWS: CPT

## 2024-08-04 PROCEDURE — 85025 COMPLETE CBC W/AUTO DIFF WBC: CPT

## 2024-08-04 PROCEDURE — 83690 ASSAY OF LIPASE: CPT

## 2024-08-04 PROCEDURE — 96375 TX/PRO/DX INJ NEW DRUG ADDON: CPT | Mod: XU

## 2024-08-04 PROCEDURE — 71275 CT ANGIOGRAPHY CHEST: CPT | Mod: MC

## 2024-08-04 PROCEDURE — 99284 EMERGENCY DEPT VISIT MOD MDM: CPT | Mod: 25

## 2024-08-04 PROCEDURE — 96374 THER/PROPH/DIAG INJ IV PUSH: CPT | Mod: XU

## 2024-08-04 PROCEDURE — 74174 CTA ABD&PLVS W/CONTRAST: CPT | Mod: MC

## 2024-08-04 PROCEDURE — 74174 CTA ABD&PLVS W/CONTRAST: CPT | Mod: 26,MC

## 2024-08-04 PROCEDURE — 36415 COLL VENOUS BLD VENIPUNCTURE: CPT

## 2024-08-04 PROCEDURE — 74019 RADEX ABDOMEN 2 VIEWS: CPT | Mod: 26

## 2024-08-04 PROCEDURE — 71275 CT ANGIOGRAPHY CHEST: CPT | Mod: 26,MC

## 2024-08-04 PROCEDURE — 80053 COMPREHEN METABOLIC PANEL: CPT

## 2024-08-04 PROCEDURE — 94640 AIRWAY INHALATION TREATMENT: CPT

## 2024-08-04 RX ORDER — ALBUTEROL 90 MCG
2.5 AEROSOL REFILL (GRAM) INHALATION ONCE
Refills: 0 | Status: COMPLETED | OUTPATIENT
Start: 2024-08-04 | End: 2024-08-04

## 2024-08-04 RX ORDER — DIPHENHYDRAMINE HCL 25 MG
50 CAPSULE ORAL ONCE
Refills: 0 | Status: COMPLETED | OUTPATIENT
Start: 2024-08-04 | End: 2024-08-04

## 2024-08-04 RX ORDER — METHYLPREDNISOLONE ACETATE 40 MG/ML
125 INJECTION, SUSPENSION INTRA-ARTICULAR; INTRALESIONAL; INTRAMUSCULAR; INTRASYNOVIAL; SOFT TISSUE ONCE
Refills: 0 | Status: COMPLETED | OUTPATIENT
Start: 2024-08-04 | End: 2024-08-04

## 2024-08-04 RX ORDER — IPRATROPIUM BROMIDE AND ALBUTEROL SULFATE 2.5; .5 MG/3ML; MG/3ML
3 SOLUTION RESPIRATORY (INHALATION) ONCE
Refills: 0 | Status: COMPLETED | OUTPATIENT
Start: 2024-08-04 | End: 2024-08-04

## 2024-08-04 RX ADMIN — METHYLPREDNISOLONE ACETATE 125 MILLIGRAM(S): 40 INJECTION, SUSPENSION INTRA-ARTICULAR; INTRALESIONAL; INTRAMUSCULAR; INTRASYNOVIAL; SOFT TISSUE at 17:08

## 2024-08-04 RX ADMIN — IPRATROPIUM BROMIDE AND ALBUTEROL SULFATE 3 MILLILITER(S): 2.5; .5 SOLUTION RESPIRATORY (INHALATION) at 18:06

## 2024-08-04 RX ADMIN — Medication 50 MILLIGRAM(S): at 21:00

## 2024-08-04 NOTE — ED PROVIDER NOTE - PROGRESS NOTE DETAILS
Patient with stool burden on CAT scan, dissection unchanged from prior imaging, patient well-appearing here in no acute distress.  Patient offered for surgery consult due to no large bowel movement on colostomy bag although there is some stool there patient prefer to follow-up with her colorectal surgeon at this time.  Patient encouraged to have a bowel regimen.  No obstruction on CT.  From chart review patient was here couple days ago with similar findings on CT scan and surgery recommended bowel regimen no surgical or procedural intervention at that time.   patient states she is being followed for dissection at this time her surgeons are watching it no interventions required.  Patient would like to be discharged and will follow-up with her standing doctors.

## 2024-08-04 NOTE — ED PROVIDER NOTE - CARE PROVIDER_API CALL
Terrell Luong  Colon/Rectal Surgery  [address] [place] [address], [address]  [place] [address]  Phone: [phone]  Fax: [phone]  Established Patient  Follow Up Time: Urgent

## 2024-08-04 NOTE — ED ADULT NURSE NOTE - OBJECTIVE STATEMENT
A&O x 4 with family at bedside, reports low output from ostomy and states "ostomy is out more", osteomy appears beefy red with some drainage. Also has some neck pain with coughing. Hx of aortic dissection. +TTP to diffuse abdomen. + nausea, +vomiting one episode. Denies SOB, Chest pain, current abdominal pain. Bed is in lowest position and side rails up.

## 2024-08-04 NOTE — ED ADULT NURSE NOTE - NSICDXPASTSURGICALHX_GEN_ALL_CORE_FT
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    H/O pelvic surgery 5 years ago - s/p fracture    H/O total knee replacement, bilateral 5 years ago    History of partial hysterectomy 30 years ago - fibroids    History of sinus surgery multiple sinus surgeries    History of tracheomalacia 2015 - attempted tracheal stenting ([facility])- course complicated by obstruction, respiratory failure, multiple CPR attempts -  stent discontinued; 10/20/2016 Tracheobronchoplasty (Prolene Mesh) performed at [facility] by Dr Zapien    Rectal bleeding exam under anesthesia ([facility]) 2/2018    S/P aortic valve replacement     S/P bronchoscopy 6/5/2018 - [place] [facility] (Dr Zapien) no evidence of tracheobronchomalacia in trachea or bronchial tubes    S/P TAVR (transcatheter aortic valve replacement)

## 2024-08-04 NOTE — ED PROVIDER NOTE - NSICDXPASTSURGICALHX_GEN_ALL_CORE_FT
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    H/O pelvic surgery 5 years ago - s/p fracture    H/O total knee replacement, bilateral 5 years ago    History of partial hysterectomy 30 years ago - fibroids    History of sinus surgery multiple sinus surgeries    History of tracheomalacia 2015 - attempted tracheal stenting ([facility])- course complicated by obstruction, respiratory failure, multiple CPR attempts -  stent discontinued; 10/20/2016 Tracheobronchoplasty (Prolene Mesh) performed at [facility] by Dr Zapien    Rectal bleeding exam under anesthesia ([facility]) 2/2018    S/P aortic valve replacement     S/P bronchoscopy 6/5/2018 - [place] (Dr Zapien) no evidence of tracheobronchomalacia in trachea or bronchial tubes    S/P TAVR (transcatheter aortic valve replacement)

## 2024-08-04 NOTE — ED ADULT NURSE REASSESSMENT NOTE - NS ED NURSE REASSESS COMMENT FT1
Patient is A&O x 4, observed resting in bed comfortably with family at bedside. Denies any current symptoms. Bed is in lowest position and side rails up. Currently awaiting CT.

## 2024-08-04 NOTE — ED PROVIDER NOTE - CLINICAL SUMMARY MEDICAL DECISION MAKING FREE TEXT BOX
74 yo female with multiple medical problems with decreased stool output;  pt for ct chest abd pelvis with iv contrast;  labs and reassess

## 2024-08-04 NOTE — ED PROVIDER NOTE - PATIENT PORTAL LINK FT
You can access the FollowMyHealth Patient Portal offered by [facility] by registering at the following website: [email or web address]. By joining [email or web address]’s FollowMyHealth portal, you will also be able to view your health information using other applications (apps) compatible with our system.

## 2024-08-04 NOTE — ED PROVIDER NOTE - OBJECTIVE STATEMENT
73-year-old female with multiple medical problems including colostomy, atrial fibs seizure COPD comes to the ED with decreased output from her colostomy bag.  Patient noted was here 2 days ago and had a CAT scan of the chest abdomen pelvis without contrast and noted possible fecal impaction.  Patient treated with lactulose and MiraLAX without any results.  Patient noted with brown stool in her colostomy bag.  She noted some distention of her abdomen and nausea and vomiting.

## 2024-08-04 NOTE — ED ADULT NURSE REASSESSMENT NOTE - NS ED NURSE REASSESS COMMENT FT1
Assumed care of patient. patient is alert and orientated x4. respirations are even and non-labored. IV site d/c/i. patient on cm. o2 sat 96% on room air. patient denies shortness of breath or chest pain. patient pending medication prior to CT.

## 2024-08-04 NOTE — ED ADULT NURSE NOTE - NSFALLUNIVINTERV_ED_ALL_ED
----- Message from ZANE Navarrete CNP sent at 3/18/2022  3:01 PM CDT -----  Normal labs   Continue same dose of levothyroxine follow up in a year and as needed  Continue other medications     Genny DEGROOT FNP-BC  Family Nurse Practitioner     Bed/Stretcher in lowest position, wheels locked, appropriate side rails in place/Call bell, personal items and telephone in reach/Instruct patient to call for assistance before getting out of bed/chair/stretcher/Non-slip footwear applied when patient is off stretcher/[place] to call system/Physically safe environment - no spills, clutter or unnecessary equipment/Purposeful proactive rounding/Room/bathroom lighting operational, light cord in reach

## 2024-08-04 NOTE — ED PROVIDER NOTE - NSFOLLOWUPINSTRUCTIONS_ED_ALL_ED_FT
Constipation, Adult    Constipation is when a person has fewer than three bowel movements in a week, has difficulty having a bowel movement, or has stools (feces) that are dry, hard, or larger than normal. Constipation may be caused by an underlying condition. It may become worse with age if a person takes certain medicines and does not take in enough fluids.    Follow these instructions at home:      Eating and drinking     Eat foods that have a lot of fiber, such as beans, whole grains, and fresh fruits and vegetables.  Limit foods that are low in fiber and high in fat and processed sugars, such as fried or sweet foods. These include french fries, hamburgers, cookies, candies, and soda.  Drink enough fluid to keep your urine pale yellow.        General instructions    Exercise regularly or as told by your health care provider. Try to do 150 minutes of moderate exercise each week.  Use the bathroom when you have the urge to go. Do not hold it in.  Take over-the-counter and prescription medicines only as told by your health care provider. This includes any fiber supplements.  During bowel movements:     Practice deep breathing while relaxing the lower abdomen.  Practice pelvic floor relaxation.  Watch your condition for any changes. Let your health care provider know about them.  Keep all follow-up visits as told by your health care provider. This is important.    Contact a health care provider if:  You have pain that gets worse.  You have a fever.  You do not have a bowel movement after 4 days.  You vomit.  You are not hungry or you lose weight.  You are bleeding from the opening between the buttocks (anus).  You have thin, pencil-like stools.    Get help right away if:  You have a fever and your symptoms suddenly get worse.  You leak stool or have blood in your stool.  Your abdomen is bloated.  You have severe pain in your abdomen.  You feel dizzy or you faint.    Summary  Constipation is when a person has fewer than three bowel movements in a week, has difficulty having a bowel movement, or has stools (feces) that are dry, hard, or larger than normal.  Eat foods that have a lot of fiber, such as beans, whole grains, and fresh fruits and vegetables.  Drink enough fluid to keep your urine pale yellow.  Take over-the-counter and prescription medicines only as told by your health care provider. This includes any fiber supplements.    ADDITIONAL NOTES AND INSTRUCTIONS    -Please follow-up with your primary care doctor in the next 2 days.  Please call tomorrow for an appointment.  If you cannot follow-up with your primary care doctor please return to the ED for any urgent issues.  - You were given a copy of the tests performed today.  Please bring the results with you and review them with your primary care doctor.  - If you have any worsening of symptoms or any other concerns please return to the ED immediately.  - Please continue taking your home medications as directed.

## 2024-08-04 NOTE — ED ADULT NURSE NOTE - NSICDXPASTMEDICALHX_GEN_ALL_CORE_FT
PAST MEDICAL HISTORY:  Atrial fibrillation     COPD (chronic obstructive pulmonary disease)     DVT (deep venous thrombosis) 15-20 years ago, took coumadin    Seizure x 1 1/7/18    TIA (transient ischemic attack) multiple, last 5 years ago - presents as right-sided weakness

## 2024-08-04 NOTE — ED PROVIDER NOTE - CARE PROVIDERS DIRECT ADDRESSES
[email or web address]

## 2024-08-04 NOTE — ED ADULT TRIAGE NOTE - WEIGHT METHOD
A user error has taken place: encounter opened in error, closed for administrative reasons.   stated

## 2024-08-05 VITALS
SYSTOLIC BLOOD PRESSURE: 151 MMHG | TEMPERATURE: 98 F | DIASTOLIC BLOOD PRESSURE: 70 MMHG | OXYGEN SATURATION: 96 % | RESPIRATION RATE: 19 BRPM | HEART RATE: 56 BPM

## 2024-08-05 RX ORDER — LORATADINE 10 MG
17 TABLET,DISINTEGRATING ORAL ONCE
Refills: 0 | Status: COMPLETED | OUTPATIENT
Start: 2024-08-05 | End: 2024-08-05

## 2024-08-07 ENCOUNTER — APPOINTMENT (OUTPATIENT)
Dept: COLORECTAL SURGERY | Facility: CLINIC | Age: 74
End: 2024-08-07

## 2024-08-07 PROBLEM — Z91.041 ALLERGY TO IODINATED CONTRAST: Status: ACTIVE | Noted: 2024-08-07

## 2024-08-07 PROCEDURE — 99213 OFFICE O/P EST LOW 20 MIN: CPT

## 2024-08-07 NOTE — HISTORY OF PRESENT ILLNESS
[FreeTextEntry1] : 73-year-old female who is well-known to me.  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  She eventually required abdominoperineal resection to control her disease with a permanent left lower quadrant and colostomy.  Patient has pulmonary issues which are chronic and also has had an aortic dissection which now is being treated medically.  Patient noted some problems with her colostomy function and has some tenderness around the colostomy and some asymmetry of her abdomen.  Last evening she took citrate of magnesia and had significant output from the colostomy but is concerned about the asymmetry and discomfort.  Physical examination reveals an elderly and frail-appearing female.  As she is breathing in front of me she has significant upper airway and lower airway noises and rhonchi.  Her abdomen is soft but there is some tenderness laterally around the permanent left lower quadrant end colostomy.  There is also asymmetry present.  She does not have complete obstruction as she is having colostomy output.  There is definitely a change in her ostomy function and some asymmetry and tenderness.  Will obtain a CAT scan to see if she has a parastomal hernia.

## 2024-08-07 NOTE — H&P ADULT - EJECTION FRACTION >40 YES
Include Pregnancy/Lactation Warning?: No Sarecycline Pregnancy And Lactation Text: This medication is Pregnancy Category D and not consider safe during pregnancy. It is also excreted in breast milk. Isotretinoin Counseling: Patient should get monthly blood tests, not donate blood, not drive at night if vision affected, not share medication, and not undergo elective surgery for 6 months after tx completed. Side effects reviewed, pt to contact office should one occur. Topical Clindamycin Pregnancy And Lactation Text: This medication is Pregnancy Category B and is considered safe during pregnancy. It is unknown if it is excreted in breast milk. Birth Control Pills Pregnancy And Lactation Text: This medication should be avoided if pregnant and for the first 30 days post-partum. Topical Sulfur Applications Pregnancy And Lactation Text: This medication is Pregnancy Category C and has an unknown safety profile during pregnancy. It is unknown if this topical medication is excreted in breast milk. Spironolactone Pregnancy And Lactation Text: This medication can cause feminization of the male fetus and should be avoided during pregnancy. The active metabolite is also found in breast milk. Benzoyl Peroxide Counseling: Patient counseled that medicine may cause skin irritation and bleach clothing.  In the event of skin irritation, the patient was advised to reduce the amount of the drug applied or use it less frequently.   The patient verbalized understanding of the proper use and possible adverse effects of benzoyl peroxide.  All of the patient's questions and concerns were addressed. Topical Retinoid counseling:  Patient advised to apply a pea-sized amount only at bedtime and wait 30 minutes after washing their face before applying.  If too drying, patient may add a non-comedogenic moisturizer. The patient verbalized understanding of the proper use and possible adverse effects of retinoids.  All of the patient's questions and concerns were addressed. Dapsone Pregnancy And Lactation Text: This medication is Pregnancy Category C and is not considered safe during pregnancy or breast feeding. High Dose Vitamin A Counseling: Side effects reviewed, pt to contact office should one occur. Winlevi Counseling:  I discussed with the patient the risks of topical clascoterone including but not limited to erythema, scaling, itching, and stinging. Patient voiced their understanding. Sunscreen Recommendations: Add sunscreen to daily skin care Topical Retinoid Pregnancy And Lactation Text: This medication is Pregnancy Category C. It is unknown if this medication is excreted in breast milk. Doxycycline Counseling:  Patient counseled regarding possible photosensitivity and increased risk for sunburn.  Patient instructed to avoid sunlight, if possible.  When exposed to sunlight, patients should wear protective clothing, sunglasses, and sunscreen.  The patient was instructed to call the office immediately if the following severe adverse effects occur:  hearing changes, easy bruising/bleeding, severe headache, or vision changes.  The patient verbalized understanding of the proper use and possible adverse effects of doxycycline.  All of the patient's questions and concerns were addressed. High Dose Vitamin A Pregnancy And Lactation Text: High dose vitamin A therapy is contraindicated during pregnancy and breast feeding. Aklief counseling:  Patient advised to apply a pea-sized amount only at bedtime and wait 30 minutes after washing their face before applying.  If too drying, patient may add a non-comedogenic moisturizer.  The most commonly reported side effects including irritation, redness, scaling, dryness, stinging, burning, itching, and increased risk of sunburn.  The patient verbalized understanding of the proper use and possible adverse effects of retinoids.  All of the patient's questions and concerns were addressed. Azithromycin Pregnancy And Lactation Text: This medication is considered safe during pregnancy and is also secreted in breast milk. Tazorac Pregnancy And Lactation Text: This medication is not safe during pregnancy. It is unknown if this medication is excreted in breast milk. Azelaic Acid Counseling: Patient counseled that medicine may cause skin irritation and to avoid applying near the eyes.  In the event of skin irritation, the patient was advised to reduce the amount of the drug applied or use it less frequently.   The patient verbalized understanding of the proper use and possible adverse effects of azelaic acid.  All of the patient's questions and concerns were addressed. Bactrim Pregnancy And Lactation Text: This medication is Pregnancy Category D and is known to cause fetal risk.  It is also excreted in breast milk. Erythromycin Counseling:  I discussed with the patient the risks of erythromycin including but not limited to GI upset, allergic reaction, drug rash, diarrhea, increase in liver enzymes, and yeast infections. Topical Clindamycin Counseling: Patient counseled that this medication may cause skin irritation or allergic reactions.  In the event of skin irritation, the patient was advised to reduce the amount of the drug applied or use it less frequently.   The patient verbalized understanding of the proper use and possible adverse effects of clindamycin.  All of the patient's questions and concerns were addressed. Detail Level: Detailed Spironolactone Counseling: Patient advised regarding risks of diarrhea, abdominal pain, hyperkalemia, birth defects (for female patients), liver toxicity and renal toxicity. The patient may need blood work to monitor liver and kidney function and potassium levels while on therapy. The patient verbalized understanding of the proper use and possible adverse effects of spironolactone.  All of the patient's questions and concerns were addressed. Azelaic Acid Pregnancy And Lactation Text: This medication is considered safe during pregnancy and breast feeding. Birth Control Pills Counseling: Birth Control Pill Counseling: I discussed with the patient the potential side effects of OCPs including but not limited to increased risk of stroke, heart attack, thrombophlebitis, deep venous thrombosis, hepatic adenomas, breast changes, GI upset, headaches, and depression.  The patient verbalized understanding of the proper use and possible adverse effects of OCPs. All of the patient's questions and concerns were addressed. Isotretinoin Pregnancy And Lactation Text: This medication is Pregnancy Category X and is considered extremely dangerous during pregnancy. It is unknown if it is excreted in breast milk. Dapsone Counseling: I discussed with the patient the risks of dapsone including but not limited to hemolytic anemia, agranulocytosis, rashes, methemoglobinemia, kidney failure, peripheral neuropathy, headaches, GI upset, and liver toxicity.  Patients who start dapsone require monitoring including baseline LFTs and weekly CBCs for the first month, then every month thereafter.  The patient verbalized understanding of the proper use and possible adverse effects of dapsone.  All of the patient's questions and concerns were addressed. Tetracycline Counseling: Patient counseled regarding possible photosensitivity and increased risk for sunburn.  Patient instructed to avoid sunlight, if possible.  When exposed to sunlight, patients should wear protective clothing, sunglasses, and sunscreen.  The patient was instructed to call the office immediately if the following severe adverse effects occur:  hearing changes, easy bruising/bleeding, severe headache, or vision changes.  The patient verbalized understanding of the proper use and possible adverse effects of tetracycline.  All of the patient's questions and concerns were addressed. Patient understands to avoid pregnancy while on therapy due to potential birth defects. Benzoyl Peroxide Pregnancy And Lactation Text: This medication is Pregnancy Category C. It is unknown if benzoyl peroxide is excreted in breast milk. Topical Sulfur Applications Counseling: Topical Sulfur Counseling: Patient counseled that this medication may cause skin irritation or allergic reactions.  In the event of skin irritation, the patient was advised to reduce the amount of the drug applied or use it less frequently.   The patient verbalized understanding of the proper use and possible adverse effects of topical sulfur application.  All of the patient's questions and concerns were addressed. Moisturizer Recommendations: CeraVe Minocycline Counseling: Patient advised regarding possible photosensitivity and discoloration of the teeth, skin, lips, tongue and gums.  Patient instructed to avoid sunlight, if possible.  When exposed to sunlight, patients should wear protective clothing, sunglasses, and sunscreen.  The patient was instructed to call the office immediately if the following severe adverse effects occur:  hearing changes, easy bruising/bleeding, severe headache, or vision changes.  The patient verbalized understanding of the proper use and possible adverse effects of minocycline.  All of the patient's questions and concerns were addressed. Azithromycin Counseling:  I discussed with the patient the risks of azithromycin including but not limited to GI upset, allergic reaction, drug rash, diarrhea, and yeast infections. Bactrim Counseling:  I discussed with the patient the risks of sulfa antibiotics including but not limited to GI upset, allergic reaction, drug rash, diarrhea, dizziness, photosensitivity, and yeast infections.  Rarely, more serious reactions can occur including but not limited to aplastic anemia, agranulocytosis, methemoglobinemia, blood dyscrasias, liver or kidney failure, lung infiltrates or desquamative/blistering drug rashes. Doxycycline Pregnancy And Lactation Text: This medication is Pregnancy Category D and not consider safe during pregnancy. It is also excreted in breast milk but is considered safe for shorter treatment courses. Aklief Pregnancy And Lactation Text: It is unknown if this medication is safe to use during pregnancy.  It is unknown if this medication is excreted in breast milk.  Breastfeeding women should use the topical cream on the smallest area of the skin for the shortest time needed while breastfeeding.  Do not apply to nipple and areola. Tazorac Counseling:  Patient advised that medication is irritating and drying.  Patient may need to apply sparingly and wash off after an hour before eventually leaving it on overnight.  The patient verbalized understanding of the proper use and possible adverse effects of tazorac.  All of the patient's questions and concerns were addressed. Sarecycline Counseling: Patient advised regarding possible photosensitivity and discoloration of the teeth, skin, lips, tongue and gums.  Patient instructed to avoid sunlight, if possible.  When exposed to sunlight, patients should wear protective clothing, sunglasses, and sunscreen.  The patient was instructed to call the office immediately if the following severe adverse effects occur:  hearing changes, easy bruising/bleeding, severe headache, or vision changes.  The patient verbalized understanding of the proper use and possible adverse effects of sarecycline.  All of the patient's questions and concerns were addressed. Winlevi Pregnancy And Lactation Text: This medication is considered safe during pregnancy and breastfeeding. Erythromycin Pregnancy And Lactation Text: This medication is Pregnancy Category B and is considered safe during pregnancy. It is also excreted in breast milk. normal LV function

## 2024-08-08 ENCOUNTER — APPOINTMENT (OUTPATIENT)
Dept: CT IMAGING | Facility: CLINIC | Age: 74
End: 2024-08-08

## 2024-08-08 ENCOUNTER — OUTPATIENT (OUTPATIENT)
Dept: OUTPATIENT SERVICES | Facility: HOSPITAL | Age: 74
LOS: 1 days | End: 2024-08-08
Payer: MEDICARE

## 2024-08-08 ENCOUNTER — RESULT REVIEW (OUTPATIENT)
Age: 74
End: 2024-08-08

## 2024-08-08 DIAGNOSIS — H05.352 EXOSTOSIS OF LEFT ORBIT: Chronic | ICD-10-CM

## 2024-08-08 DIAGNOSIS — Z00.8 ENCOUNTER FOR OTHER GENERAL EXAMINATION: ICD-10-CM

## 2024-08-08 DIAGNOSIS — Z98.89 OTHER SPECIFIED POSTPROCEDURAL STATES: Chronic | ICD-10-CM

## 2024-08-08 DIAGNOSIS — K62.5 HEMORRHAGE OF ANUS AND RECTUM: Chronic | ICD-10-CM

## 2024-08-08 DIAGNOSIS — Z87.09 PERSONAL HISTORY OF OTHER DISEASES OF THE RESPIRATORY SYSTEM: Chronic | ICD-10-CM

## 2024-08-08 DIAGNOSIS — Z98.890 OTHER SPECIFIED POSTPROCEDURAL STATES: Chronic | ICD-10-CM

## 2024-08-08 DIAGNOSIS — Z95.2 PRESENCE OF PROSTHETIC HEART VALVE: Chronic | ICD-10-CM

## 2024-08-08 DIAGNOSIS — Z96.653 PRESENCE OF ARTIFICIAL KNEE JOINT, BILATERAL: Chronic | ICD-10-CM

## 2024-08-08 PROCEDURE — 74176 CT ABD & PELVIS W/O CONTRAST: CPT | Mod: MH

## 2024-08-08 PROCEDURE — 74176 CT ABD & PELVIS W/O CONTRAST: CPT | Mod: 26,MH

## 2024-08-09 ENCOUNTER — APPOINTMENT (OUTPATIENT)
Dept: CT IMAGING | Facility: CLINIC | Age: 74
End: 2024-08-09

## 2024-08-23 ENCOUNTER — APPOINTMENT (OUTPATIENT)
Dept: ENDOCRINOLOGY | Facility: CLINIC | Age: 74
End: 2024-08-23

## 2024-08-23 VITALS
DIASTOLIC BLOOD PRESSURE: 60 MMHG | HEIGHT: 67 IN | SYSTOLIC BLOOD PRESSURE: 118 MMHG | HEART RATE: 64 BPM | TEMPERATURE: 98.6 F | OXYGEN SATURATION: 98 % | BODY MASS INDEX: 25.58 KG/M2 | WEIGHT: 163 LBS

## 2024-08-23 DIAGNOSIS — E11.9 TYPE 2 DIABETES MELLITUS W/OUT COMPLICATIONS: ICD-10-CM

## 2024-08-23 DIAGNOSIS — E55.9 VITAMIN D DEFICIENCY, UNSPECIFIED: ICD-10-CM

## 2024-08-23 DIAGNOSIS — I10 ESSENTIAL (PRIMARY) HYPERTENSION: ICD-10-CM

## 2024-08-23 DIAGNOSIS — E78.5 HYPERLIPIDEMIA, UNSPECIFIED: ICD-10-CM

## 2024-08-23 LAB
GLUCOSE BLDC GLUCOMTR-MCNC: 101
HBA1C MFR BLD HPLC: 7.3

## 2024-08-23 PROCEDURE — 95251 CONT GLUC MNTR ANALYSIS I&R: CPT

## 2024-08-23 PROCEDURE — 99214 OFFICE O/P EST MOD 30 MIN: CPT

## 2024-08-23 PROCEDURE — 83036 HEMOGLOBIN GLYCOSYLATED A1C: CPT | Mod: QW

## 2024-08-23 NOTE — ADDENDUM
[FreeTextEntry1] : Blood will be drawn in office today. POCT glucose testing and Hemoglobin A1c was carried out today given diabetes diagnosis.  This note was written by Keyona Padilla on 08/23/2024 acting as medical scribe for Dr. Moreno Panchal. I, Dr. Moreno Panchal, have read and attest that all the information, medical decision making and discharge instructions within are true and accurate.

## 2024-08-23 NOTE — HISTORY OF PRESENT ILLNESS
[FreeTextEntry1] : Ms. RAYRAY RODRIGUEZ is a 73-year-old female who returns with regard to a hx of type 2 dm. The diabetes has been present for about 10 years. She denies any history of retinopathy or nephropathy. With regard to neuropathy, she does report that she feels like she is wearing shoes all the time in the left foot .  Reports 2 thoracic aortic aneurysm ruptures in July 2024 and August 2024.  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 Barrios. Keppra was increased to 750 XR BID.  She is taking Lantus 30 units in Am and 8 units HS and Novolog 34 units <100, 54 units when 101-140, 74 units when 141-200, 104 units when 201-250, 114 units when 251-300, 124 units when 300.   POCT A1C returned today at 7.3%, previously 8.3% on October 2023.  POCT glucose returned today at 101 mg/dl  HGM via LibrNubimetricsiew has been showing values between 90s-190s throughout the day. Rare hypoglycemia in the 60s with one episode at 5-6 o'clock in the morning and one episode at 8 o'clock in the morning Average glucose 154, glucose variability 34.1%.   She denies polyuria, polydipsia, or any visual changes. She too denies any skin lesions, skin breakdown or non-healing areas of skin. She too denies any podiatric concerns. Ophthalmologic evaluation is up to date.  s/p TAVR on 09/06/2023,  Additional medical history includes that of recurrent pneumonia, Multiple Myeloma, hypertension, Hyperlipidemia, Aortic insufficiency-s/p TAVR, PAF, asthma, and hx of Tracheoplasty along with hx of Squamous cell Ca of Anal canal. Too with hx of vitamin d deficiency, bronchiectasis  Current Medications: Warfarin, Plavix, gabapentin, Mexiletine, Pantoprazole, Rosuvastatin 5 mg and medrol 8 mg daily, Zophran,

## 2024-08-23 NOTE — HISTORY OF PRESENT ILLNESS
[FreeTextEntry1] : Ms. RAYRAY RODRIGUEZ is a 73-year-old female who returns with regard to a hx of type 2 dm. The diabetes has been present for about 10 years. She denies any history of retinopathy or nephropathy. With regard to neuropathy, she does report that she feels like she is wearing shoes all the time in the left foot .  Reports 2 thoracic aortic aneurysm ruptures in July 2024 and August 2024.  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 Barrios. Keppra was increased to 750 XR BID.  She is taking Lantus 30 units in Am and 8 units HS and Novolog 34 units <100, 54 units when 101-140, 74 units when 141-200, 104 units when 201-250, 114 units when 251-300, 124 units when 300.   POCT A1C returned today at 7.3%, previously 8.3% on October 2023.  POCT glucose returned today at 101 mg/dl  HGM via [employer] has been showing values between 90s-190s throughout the day. Rare hypoglycemia in the 60s with one episode at 5-6 o'clock in the morning and one episode at 8 o'clock in the morning Average glucose 154, glucose variability 34.1%.   She denies polyuria, polydipsia, or any visual changes. She too denies any skin lesions, skin breakdown or non-healing areas of skin. She too denies any podiatric concerns. Ophthalmologic evaluation is up to date.  s/p TAVR on 09/06/2023,  Additional medical history includes that of recurrent pneumonia, Multiple Myeloma, hypertension, Hyperlipidemia, Aortic insufficiency-s/p TAVR, PAF, asthma, and hx of Tracheoplasty along with hx of Squamous cell Ca of Anal canal. Too with hx of vitamin d deficiency, bronchiectasis  Current Medications: Warfarin, Plavix, gabapentin, Mexiletine, Pantoprazole, Rosuvastatin 5 mg and medrol 8 mg daily, Zophran,

## 2024-08-25 ENCOUNTER — NON-APPOINTMENT (OUTPATIENT)
Age: 74
End: 2024-08-25

## 2024-08-27 ENCOUNTER — RX RENEWAL (OUTPATIENT)
Age: 74
End: 2024-08-27

## 2024-08-30 ENCOUNTER — APPOINTMENT (OUTPATIENT)
Dept: INTERNAL MEDICINE | Facility: CLINIC | Age: 74
End: 2024-08-30

## 2024-09-06 ENCOUNTER — RESULT REVIEW (OUTPATIENT)
Age: 74
End: 2024-09-06

## 2024-09-06 ENCOUNTER — APPOINTMENT (OUTPATIENT)
Dept: MAMMOGRAPHY | Facility: IMAGING CENTER | Age: 74
End: 2024-09-06
Payer: MEDICARE

## 2024-09-06 ENCOUNTER — OUTPATIENT (OUTPATIENT)
Dept: OUTPATIENT SERVICES | Facility: HOSPITAL | Age: 74
LOS: 1 days | End: 2024-09-06
Payer: MEDICARE

## 2024-09-06 DIAGNOSIS — Z00.8 ENCOUNTER FOR OTHER GENERAL EXAMINATION: ICD-10-CM

## 2024-09-06 DIAGNOSIS — Z98.89 OTHER SPECIFIED POSTPROCEDURAL STATES: Chronic | ICD-10-CM

## 2024-09-06 DIAGNOSIS — H05.352 EXOSTOSIS OF LEFT ORBIT: Chronic | ICD-10-CM

## 2024-09-06 DIAGNOSIS — Z98.890 OTHER SPECIFIED POSTPROCEDURAL STATES: Chronic | ICD-10-CM

## 2024-09-06 DIAGNOSIS — Z95.2 PRESENCE OF PROSTHETIC HEART VALVE: Chronic | ICD-10-CM

## 2024-09-06 DIAGNOSIS — C21.0 MALIGNANT NEOPLASM OF ANUS, UNSPECIFIED: ICD-10-CM

## 2024-09-06 DIAGNOSIS — K62.5 HEMORRHAGE OF ANUS AND RECTUM: Chronic | ICD-10-CM

## 2024-09-06 DIAGNOSIS — Z87.09 PERSONAL HISTORY OF OTHER DISEASES OF THE RESPIRATORY SYSTEM: Chronic | ICD-10-CM

## 2024-09-06 DIAGNOSIS — Z96.653 PRESENCE OF ARTIFICIAL KNEE JOINT, BILATERAL: Chronic | ICD-10-CM

## 2024-09-06 PROCEDURE — 77067 SCR MAMMO BI INCL CAD: CPT | Mod: 26

## 2024-09-06 PROCEDURE — 77067 SCR MAMMO BI INCL CAD: CPT

## 2024-09-06 PROCEDURE — 77063 BREAST TOMOSYNTHESIS BI: CPT

## 2024-09-06 PROCEDURE — 77063 BREAST TOMOSYNTHESIS BI: CPT | Mod: 26

## 2024-09-07 DIAGNOSIS — B00.9 HERPESVIRAL INFECTION, UNSPECIFIED: ICD-10-CM

## 2024-09-07 RX ORDER — VALACYCLOVIR 1 G/1
1 TABLET, FILM COATED ORAL
Qty: 20 | Refills: 0 | Status: ACTIVE | COMMUNITY
Start: 2024-09-07 | End: 1900-01-01

## 2024-09-10 ENCOUNTER — APPOINTMENT (OUTPATIENT)
Dept: DERMATOLOGY | Facility: CLINIC | Age: 74
End: 2024-09-10
Payer: MEDICARE

## 2024-09-10 VITALS — BODY MASS INDEX: 24.33 KG/M2 | HEIGHT: 67 IN | WEIGHT: 155 LBS

## 2024-09-10 DIAGNOSIS — L98.9 DISORDER OF THE SKIN AND SUBCUTANEOUS TISSUE, UNSPECIFIED: ICD-10-CM

## 2024-09-10 DIAGNOSIS — Z12.83 ENCOUNTER FOR SCREENING FOR MALIGNANT NEOPLASM OF SKIN: ICD-10-CM

## 2024-09-10 DIAGNOSIS — L82.1 OTHER SEBORRHEIC KERATOSIS: ICD-10-CM

## 2024-09-10 DIAGNOSIS — D22.9 MELANOCYTIC NEVI, UNSPECIFIED: ICD-10-CM

## 2024-09-10 PROCEDURE — 99214 OFFICE O/P EST MOD 30 MIN: CPT

## 2024-09-10 RX ORDER — MUPIROCIN 20 MG/G
2 OINTMENT TOPICAL
Qty: 1 | Refills: 2 | Status: ACTIVE | COMMUNITY
Start: 2024-09-10 | End: 1900-01-01

## 2024-09-10 NOTE — HISTORY OF PRESENT ILLNESS
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

## 2024-09-10 NOTE — PHYSICAL EXAM
[Alert] : alert [Oriented x 3] : ~L oriented x 3 [Well Nourished] : well nourished [Conjunctiva Non-injected] : conjunctiva non-injected [No Visual Lymphadenopathy] : no visual  lymphadenopathy [No Clubbing] : no clubbing [No Edema] : no edema [No Bromhidrosis] : no bromhidrosis [No Chromhidrosis] : no chromhidrosis [Full Body Skin Exam Performed] : performed [FreeTextEntry3] : scattered stuck on brown papules and plaques throughout erosion on L shin brown macules on bl plantar feet

## 2024-09-10 NOTE — ASSESSMENT
[FreeTextEntry1] : #Seborrheic Keratosis - pt counseled on benign nature of these lesions  #Traumatic erosion - Apply mupirocin 2% external ointment to open areas TID  #Multiple Benign Nevi - TBSE performed today - Patient educated on ABCDEs of melanoma screening - Recommend self skin exam monthly, annual exam by MD - Photoprotection reviewed including sun-protective behaviors, protective clothing, and the use of OTC broad-spectrum SPF 30+ sunscreens was advised - RTC if develops lesions that are new, symptomatic (bleeding/itching), changing in size/color/shape    RTC 1 yr or sooner PRN changes

## 2024-09-11 ENCOUNTER — APPOINTMENT (OUTPATIENT)
Dept: CARDIOTHORACIC SURGERY | Facility: CLINIC | Age: 74
End: 2024-09-11
Payer: MEDICARE

## 2024-09-11 VITALS
DIASTOLIC BLOOD PRESSURE: 61 MMHG | HEART RATE: 67 BPM | WEIGHT: 150 LBS | OXYGEN SATURATION: 95 % | BODY MASS INDEX: 23.54 KG/M2 | HEIGHT: 67 IN | TEMPERATURE: 98.2 F | RESPIRATION RATE: 14 BRPM | SYSTOLIC BLOOD PRESSURE: 98 MMHG

## 2024-09-11 DIAGNOSIS — I71.9 AORTIC ANEURYSM OF UNSPECIFIED SITE, W/OUT RUPTURE: ICD-10-CM

## 2024-09-11 DIAGNOSIS — I71.012 DISSECTION OF DESCENDING THORACIC AORTA: ICD-10-CM

## 2024-09-11 DIAGNOSIS — Z95.3 PRESENCE OF XENOGENIC HEART VALVE: ICD-10-CM

## 2024-09-11 DIAGNOSIS — I11.9 HYPERTENSIVE HEART DISEASE W/OUT HEART FAILURE: ICD-10-CM

## 2024-09-11 DIAGNOSIS — Z98.890 OTHER SPECIFIED POSTPROCEDURAL STATES: ICD-10-CM

## 2024-09-11 DIAGNOSIS — I35.1 NONRHEUMATIC AORTIC (VALVE) INSUFFICIENCY: ICD-10-CM

## 2024-09-11 PROCEDURE — 99214 OFFICE O/P EST MOD 30 MIN: CPT

## 2024-09-11 NOTE — REVIEW OF SYSTEMS
[Feeling Tired] : feeling tired [Shortness Of Breath] : shortness of breath [SOB on Exertion] : shortness of breath during exertion [Dizziness] : dizziness [Negative] : Heme/Lymph

## 2024-09-12 ENCOUNTER — APPOINTMENT (OUTPATIENT)
Dept: PULMONOLOGY | Facility: CLINIC | Age: 74
End: 2024-09-12
Payer: MEDICARE

## 2024-09-12 VITALS
HEIGHT: 67 IN | OXYGEN SATURATION: 98 % | HEART RATE: 79 BPM | SYSTOLIC BLOOD PRESSURE: 120 MMHG | WEIGHT: 158 LBS | DIASTOLIC BLOOD PRESSURE: 66 MMHG | TEMPERATURE: 97.2 F | BODY MASS INDEX: 24.8 KG/M2 | RESPIRATION RATE: 16 BRPM

## 2024-09-12 DIAGNOSIS — J47.9 BRONCHIECTASIS, UNCOMPLICATED: ICD-10-CM

## 2024-09-12 DIAGNOSIS — J30.9 ALLERGIC RHINITIS, UNSPECIFIED: ICD-10-CM

## 2024-09-12 DIAGNOSIS — J39.8 OTHER SPECIFIED DISEASES OF UPPER RESPIRATORY TRACT: ICD-10-CM

## 2024-09-12 DIAGNOSIS — R93.89 ABNORMAL FINDINGS ON DIAGNOSTIC IMAGING OF OTHER SPECIFIED BODY STRUCTURES: ICD-10-CM

## 2024-09-12 DIAGNOSIS — E55.9 VITAMIN D DEFICIENCY, UNSPECIFIED: ICD-10-CM

## 2024-09-12 DIAGNOSIS — K21.9 GASTRO-ESOPHAGEAL REFLUX DISEASE W/OUT ESOPHAGITIS: ICD-10-CM

## 2024-09-12 DIAGNOSIS — J45.50 SEVERE PERSISTENT ASTHMA, UNCOMPLICATED: ICD-10-CM

## 2024-09-12 DIAGNOSIS — R06.02 SHORTNESS OF BREATH: ICD-10-CM

## 2024-09-12 PROCEDURE — ZZZZZ: CPT

## 2024-09-12 PROCEDURE — 94010 BREATHING CAPACITY TEST: CPT

## 2024-09-12 PROCEDURE — 95012 NITRIC OXIDE EXP GAS DETER: CPT

## 2024-09-12 PROCEDURE — 94727 GAS DIL/WSHOT DETER LNG VOL: CPT

## 2024-09-12 PROCEDURE — 99214 OFFICE O/P EST MOD 30 MIN: CPT | Mod: 25

## 2024-09-12 PROCEDURE — 96372 THER/PROPH/DIAG INJ SC/IM: CPT

## 2024-09-12 PROCEDURE — 94729 DIFFUSING CAPACITY: CPT

## 2024-09-12 RX ORDER — TEZEPELUMAB-EKKO 210 MG/1.9ML
210 INJECTION, SOLUTION SUBCUTANEOUS
Qty: 0 | Refills: 0 | Status: COMPLETED | OUTPATIENT
Start: 2024-09-12

## 2024-09-12 NOTE — PROCEDURE
[FreeTextEntry1] : Full PFT reveals mild restrictive and severe obstructive dysfunction; FEV1 was 2.29 L which is 43% of predicted; mildly reduced lung volumes; normal diffusion at 36.99, which is 183% of predicted; normal flow volume loop.  PFTs were performed to evaluate for SOB  FENO was 25; a normal value being less than 25 Fractional exhaled nitric oxide (FENO) is regarded as a simple, noninvasive method for assessing eosinophilic airway inflammation. Produced by a variety of cells within the lung, nitric oxide (NO) concentrations are generally low in healthy individuals. However, high concentrations of NO appear to be involved in nonspecific host defense mechanisms and chronic inflammatory diseases such as asthma. The American Thoracic Society (ATS) therefore has recommended using FENO to aid in the diagnosis and monitoring of eosinophilic airway inflammation and asthma, and for identifying steroid responsive individuals whose chronic respiratory symptoms may be caused by airway inflammation.

## 2024-09-12 NOTE — PHYSICAL EXAM
[No Acute Distress] : no acute distress [Normal Oropharynx] : normal oropharynx [II] : Mallampati Class: II [Normal Appearance] : normal appearance [No Neck Mass] : no neck mass [Normal Rate/Rhythm] : normal rate/rhythm [Normal S1, S2] : normal s1, s2 [No Resp Distress] : no resp distress [Kyphosis] : kyphosis [Benign] : benign [Normal Gait] : normal gait [No Clubbing] : no clubbing [No Cyanosis] : no cyanosis [FROM] : FROM [Normal Color/ Pigmentation] : normal color/ pigmentation [No Focal Deficits] : no focal deficits [Oriented x3] : oriented x3 [Normal Affect] : normal affect [TextBox_2] : PND you can see in the back of throat [TextBox_54] : 2/6 systolic murmur  [TextBox_68] : I:E 1:3; mild expiratory wheeze  [TextBox_105] : trace edema in LE

## 2024-09-12 NOTE — ADDENDUM
[FreeTextEntry1] : Documented by Griffin Gonzalez acting as a scribe for Dr. Eulalio Allison on 09/12/2024. All medical record entries made by the Scribe were at my, Dr. Eulalio Allison's, direction and personally dictated by me on 09/12/2024. I have reviewed the chart and agree that the record accurately reflects my personal performance of the history, physical exam, assessment and plan. I have also personally directed, reviewed, and agree with the discharge instructions.

## 2024-09-12 NOTE — ASSESSMENT
[FreeTextEntry1] : Ms. Bloom is a 73-year-old female with a history of mm, rectal CA, AVDz, asthma, allergy, GERD, TBM, s/p multiple pneumonias, s/p TAA repair/ sepsis/ PNA- mild sob/ mucus production c/w active severe persistent asthma, bronchiectasis - s/p Rx for Proteus infection- persistent mucus issue, pseudomonas, MASA- seizures (on Rx)- chronic mucus/ SOB s/p [facility] admit for aortic tear - chronic respiratory Sx (SOB/mucus production)  Her chronic SOB which is multifactorial due to: - tracheomalacia (s/p tracheoplasty with residual frequent mucus production, though patient is non-compliant with vest therapy) - chronic bronchitis - asthma - allergic rhinitis - GERD - poor breathing mechanics - CAD/AV disease, arrhythmia, electrolyte issue  problem 1a: severe persistent asthma -(steroid dependent) - persistent sx  -add Ohtuvayre 2.5 BID -Medrol taper 32/24/16/8 mg every 3 days - move to 8 mg / day (12/2023)- 4/2024- @12 mg/day 7/2024 -recommended to use POWERbreathe Medic Plus IMT (30 reps, 2X per day) -recommended to use the Breather -continue to use albuterol via the nebulizer QID or DuoNeb via nebulizer, Q6H; add 7% sodium chloride qSH -s/p Mucomyst QiD 2cc 10% q8h due to cough -followed by the acapella device/ chest vest therapy -(1st) followed by Perforomist via the nebulizer BID -(2nd) followed by Budesonide 0.5% via the nebulizer BID -continue to use Breo Ellipta 200 at 1 inhalation QD -continue to use Incruse Ellipta 1 puff QD -failed Xolair 225 injection; follow up injections every 2 weeks - Dupixent initiated (12.3.19) -off since 3/2022 - Initiate Tezspire 8/2022 (10/2023)- restart 4/2024 -continue to use Accolate 20 mg BID -Information sheet given about prednisone to the patient to be reviewed, this medication is never to be used without consulting the prescribing physician. Proper dietary restraint is necessary specifically salt containing foods, if any reaction may occur should be reported. -Asthma is believed to be caused by inherited (genetic) and environmental factor, but its exact cause is unknown. Asthma may be triggered by allergens, lung infections, or irritants in the air. Asthma triggers are different for each person -Inhaler technique reviewed as well as oral hygiene techniques reviewed with patient. Avoidance of cold air, extremes of temperature, rescue inhaler should be used before exercise. Order of medication reviewed with patient. Recommended use of a cool mist humidifier in the bedroom.  problem 2: tracheomalacia, residual bronchomalacia -s/p tracheoplasty with Dr. Mt Zapien -continue scopolamine patch 1 mg q72 hours -continue to follow up -s/p f/u Dynamic chest CT 10/2019 positive w TBM - repeat PRN -sleep on her stomach, with a pillow in the center of her chest -Tracheomalacia is usually acquired in adults and common causes include damage by tracheostomy or endotracheal intubation damaging the tracheal cartilage with increase risk with multiple intubations, prolonged intubation, and concurrent high dose steroid therapy; external chest wall trauma and surgery; chronic compression of the trachea by benign etiologies (eg, benign mediastinal goiter) or malignancy; relapsing polychondritis; or recurrent infection. Tracheomalacia can be asymptomatic, however signs or symptoms can develop as the severity of the airway narrowing progresses with major symptoms include dyspnea, cough, and sputum retention. Other symptoms include severe paroxysms of coughing, wheezing or stridor, barking cough and may be exacerbated by forced expiration, cough, and Valsalva maneuver. Tracheomalacia is diagnosed by a bronchoscopic visualization of dynamic airway collapse on dynamic chest CT. Therapy is warranted in symptomatic patients with severe tracheomalacia and includes surgical repair as tracheobronchoplasty. The patient was referred to Dr. Valentino Nguyen or Dr. Abrahan Armando at [facility] for a surgical consult.  problem 3: chronic bronchitis and mucus clearance -add HyperSal 7% qS -continue to use acapella device multiple times daily -recommended to use chest vest therapy multiple times daily -she is being sent for sputum cultures (7/2024) -add Glycopyrrolate 1 mg q8H  Patient has a chronic cough greater than 6 months, tried and failed manual chest physiotherapy at home, no skilled caregiver available at home to perform manual CPT, tried and failed acapella vibratory physiotherapy, and recommended chest vest therapy  Problem 3A: Multiple infections ?Active (1/31/2022); s/p Proteus 10/2023 -off Tobramycin BID for 1 month (completed 12/20/19) -Complete follow up Sputum culture after completing ABx if needed. (resend);  formal ID evaluation (carie)  Problem 3B: multi myeloma -appointment on 1/28/2020 -R/o immune deficiency- IRIS to start device (4/2024) -Complete blood work to include: quantitative immunoglobulins, IgG subsets, strep pneumonia titers, T cell subsets -Due to the fact that this pt has had more infections than would be expected and immunological blood work is indicated this would include: IgG subclasses, quantitative immunoglobulins, Strep pneumoniae titers as well as Vitamin D levels. Based on this blood work we will be able to decide where the pt needs additional pneumococcal vaccine,  either Prevnar 13 or pneumovax. Immunology evaluation will also be potentially indicated.  problem 4: GERD -continue to use Protonix 40 mg before breakfast -continue Baclofen 10 mg q-meal -Rule of 2s: avoid eating too much, eating too late, eating too spicy, eating two hours before bed -Things to avoid including overeating, spicy foods, tight clothing, eating within three hours of bed, this list is not all inclusive. -For treatment of reflux, possible options discussed including diet control, H2 blockers, PPIs, as well as coating motility agents discussed as treatment options. Timing of meals and proximity of last meal to sleep were discussed. If symptoms persist, a formal gastrointestinal evaluation is needed.  problem 5: allergic rhinitis -continue to use nasal saline -continue to use Xyzal 5 mg before bed -Environmental measures for allergies were encouraged including mattress and pillow cover, air purifier, and environmental controls.  problem 6: hx of abnormal aortic valve / cardiac health - arrhythmia -continue to follow up with Dr. Leahy, AV Disease, AF -echocardiogram in June 2018 (Tosin); Kale Tristan for f/u, Ismail  problem 7: colon cancer -s/p radiation therapy, surgery -s/p to use chemotherapy follow up with Dr. King or Dr. Luong (CT as per oncology)  problem 8: poor breathing mechanics -Recommended Wim Hof and Buteyko breathing techniques -Proper breathing techniques were reviewed with an emphasis of exhalation. Patient instructed to breath in for 1 second and out for four seconds. Patient was encouraged to not talk while walking.  problem 9: abnormal chest CT- ? new nodule- likely inflammation - F/u PET/CT 4/2019- if changed Bx (Rupali); follow up and re-evaluate as per Rupali -questionable DIEUDONNE or HERMELINDO, all sputum is negative -CAT scans are the only radiological modality to identify abnormalities w/in the lungs with regards to nodules/masses/lymph nodes. Risks, benefits were reviewed in detail. The guidelines for abnormalities include follow up CT scans at various intervals which could range from 6 weeks to 1 year intervals. If there is a change for the worse then consideration for a biopsy will be considered if you are a candidate. Second opinion evaluation with a thoracic surgeon or an interventional radiologist could be offered.  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 It sometimes develops following a URI, herpes zoster outbreak in pharynx or thyroid or cervical spine injury. However, many patients have no identifiable antecedent explanation.  Problem 12: Health Maintenance/COVID19 Precautions -s/p Moderna COVID 19 vaccine x 3 - Clean your hands often. Wash your hands often with soap and water for at least 20 seconds, especially after blowing your nose, coughing, or sneezing, or having been in a public place. - If soap and water are not available, use a hand  that contains at least 60% alcohol. - To the extent possible, avoid touching high-touch surfaces in public places - elevator buttons, door handles, handrails, handshaking with people, etc. Use a tissue or your sleeve to cover your hand or finger if you must touch something. - Wash your hands after touching surfaces in public places. - Avoid touching your face, nose, eyes, etc. - Clean and disinfect your home to remove germs: practice routine cleaning of frequently touched surfaces (for example: tables, doorknobs, light switches, handles, desks, toilets, faucets, sinks & cell phones) - Avoid crowds, especially in poorly ventilated spaces. Your risk of exposure to respiratory viruses like COVID-19 may increase in crowded, closed-in settings with little air circulation if there are people in the crowd who are sick. All patients are recommended to practice social distancing and stay at least 6 feet away from others. - Avoid all non-essential travel including plane trips, and especially avoid embarking on cruise ships. -If COVID-19 is spreading in your community, take extra measures to put distance between yourself and other people to further reduce your risk of being exposed to this new virus. -Stay home as much as possible. - Consider ways of getting food brought to your house through family, social, or commercial networks -Be aware that the virus has been known to live in the air up to 3 hours post exposure, cardboard up to 24 hours post exposure, copper up to 4 hours post exposure, steel and plastic up to 2-3 days post exposure. Risk of transmission from these surfaces are affected by many variables. COVID-19 precautionary Immune Support Recommendations: -OTC Vitamin C 500mg BID -OTC Quercetin 250-500mg BID -OTC Zinc 75-100mg per day -OTC Melatonin 1 or 2mg a night -OTC Vitamin D 1-4000mg per day -OTC Tonic Water 8oz per day -Water 0.5-1 gallon per day Asthma and COVID19: You need to make sure your asthma is under control. This often requires the use of inhaled corticosteroids (and sometimes oral corticosteroids). Inhaled corticosteroids do not likely reduce your immune system's ability to fight infections, but oral corticosteroids may. It is important to use the steps above to protect yourself to limit your exposure to any respiratory virus.  problem 15: health maintenance -s/p flu shot 11/2023 -6/2021 Shingeles: Valacyclovir in progress -recommended strep pneumonia vaccines: Prevnar-13 vaccine, followed by Pneumo vaccine 23 one year following (completed) -recommended early intervention for URIs -recommended regular osteoporosis evaluations -recommended early dermatological evaluations -recommended after the age of 50 to the age of 70, colonoscopy every 5 years  F/P in 4-6 months SPI / NIOX She is encouraged to call with any changes, concerns, or questions.

## 2024-09-12 NOTE — HISTORY OF PRESENT ILLNESS
[FreeTextEntry1] : Ms. Bloom is a 73-year-old female with a history of abnormal CXR/chest CT, allergic rhinitis, severe persistent asthma, bronchiectasis, GERD, COPD, recurrent PNA, PND, s/p tracheoplasty, TBM, and SOB presenting to the office today for a follow-up pulmonary evaluation. Her chief complaint is  -she notes this morning when sitting down she felt excruciating lower back pain; pain resolved after lying down -she notes feeling her breathing is off -she notes bringing up green mucus -she notes feeling some chills -she notes not feeling like her normal self -she notes she is compliant with all her medications as prescribed -she notes taking Incruse inhaler first  -she denies using Hypertonic Saline  -she denies any headaches, nausea, emesis, fever, chills, sweats, chest pain, chest pressure, coughing, wheezing, palpitations, diarrhea, constipation, dysphagia, vertigo, arthralgias, myalgias, leg swelling, itchy eyes, itchy ears, heartburn, reflux, or sour taste in the mouth.

## 2024-09-13 NOTE — ASSESSMENT
[FreeTextEntry1] :  73 year old female with recurrent hospitalization and complex medical history including Adrenal Insufficiency, Type I Aortic Dissection s/p [place]all Procedure / Hemiarch Replacement on 9/7/22 with  , Tracheobroncho malacia, Colon Cancer s/p Resection with Colostomy, Paroxysmal A. Fib on Eliquis, TIA, Seizures, DM 2, DVT, COPD, Fungemia on Fluconazole, MRSA on Bactrim, Right Foot Osteomyelitis , AS 9/10/23 TAVR showed Valve-in-valve 26 mm Evolut FX THV in bioprosthetic aortic valve replacement with Dr. Gonsalves/ Dr. Leahy, Asthma on chronic steroids, who presents for an evaluation and management of aortic pathology. (Patient seen by Dr. Cabrera 7/22/24 and was recommended to follow up in 6 months with repeat CTA).   Patient was transferred to [facility] from [facility] (7/3 to 7/8/24) for type B aortic dissection, originally presented to [facility] for crushing chest pain described as a heaviness in her chest endorses shortness of breath . Patient received a CT angio of the chest abdomen pelvis at the outside hospital which showed a type B aortic dissection with great vessel involvement. no sign of false lumen thrombosis or true lumen compression. Previous aortic valve replacement and aortic root replacement seen. Previous AP resection of the rectum and sigmoid colon. Patient denies any chest pain at this time. States she had a couple episodes of vomiting yesterday. Patient was also found to be COVID-positive incidentally. EMS vitals on arrival were 124/74, labetalol drip initiated .7/6 NSR w brief acc jun, + covid isolation ,Nifedipine 30 qd hydral 25 q8, labetolol 100 q8 prednisone 12MG QD 7/7 vascular following, blood pressure regimen maintained 7/8 RSR/SB 55-75; htn medication adjusted; bp controlled today sbp 120's; home insulin dosages adjusted as per Endo. Patient discharged home and instructed to resume plavix and eliquis.  Today she presents and reports that dyspnea on exertion with walking, dizziness and pedal edema. She can walk up to 5 feet and need to stop due to shortness of breath. She uses 1 pillow to sleep. Denies any chest pain, back pain or PND.   CTA 8/4/24:No significant interval change compared to 8/2/2024. Moderate to large colonic stool burden. Indeterminate left upper pole renal lesion, similar to prior exams. Nonemergent renal ultrasound or MRI is recommended for further characterization. Similar chronic aortic dissection involving the thoracic and abdominal aorta.    I have reviewed the patient's medical records, diagnostic images during the time of this office consultation and have made the following recommendation. The report was reviewed and noted in the chart,  Dr. Smith independently reviewed and interpreted images and report. The surgical repair is intact and stable.       Plan   1. Follow up [facility] 1 year with CTA C/A/P . 2. Continue medication regimen. 3. Follow up with cardiologist and PCP. 4. BP control- I have recommended the patient to monitor his blood pressure closely. I have also advised the patient to take daily blood pressures at home and adhere to medication regimen. 5. Discussed signs and symptoms that warrant emergency medical attention  .

## 2024-09-13 NOTE — PHYSICAL EXAM
[Sclera] : the sclera and conjunctiva were normal [PERRL With Normal Accommodation] : pupils were equal in size, round, and reactive to light [Neck Appearance] : the appearance of the neck was normal [] : no respiratory distress [Respiration, Rhythm And Depth] : normal respiratory rhythm and effort [Auscultation Breath Sounds / Voice Sounds] : lungs were clear to auscultation bilaterally [Apical Impulse] : the apical impulse was normal [Heart Rate And Rhythm] : heart rate was normal and rhythm regular [Heart Sounds] : normal S1 and S2 [Murmurs] : no murmurs [Examination Of The Chest] : the chest was normal in appearance [2+] : left 2+ [Breast Appearance] : normal in appearance [Bowel Sounds] : normal bowel sounds [Abdomen Soft] : soft [No CVA Tenderness] : no ~M costovertebral angle tenderness [Abnormal Walk] : normal gait [Involuntary Movements] : no involuntary movements were seen [Skin Color & Pigmentation] : normal skin color and pigmentation [Skin Turgor] : normal skin turgor [No Focal Deficits] : no focal deficits [Oriented To Time, Place, And Person] : oriented to person, place, and time [Impaired Insight] : insight and judgment were intact [Mood] : the mood was normal [Affect] : the affect was normal [Memory Recent] : recent memory was not impaired [Memory Remote] : remote memory was not impaired [FreeTextEntry1] : Deferred

## 2024-09-13 NOTE — ASSESSMENT
[FreeTextEntry1] :  73 year old female with recurrent hospitalization and complex medical history including Adrenal Insufficiency, Type I Aortic Dissection s/p Bentall Procedure / Hemiarch Replacement on 9/7/22 with  , Tracheobroncho malacia, Colon Cancer s/p Resection with Colostomy, Paroxysmal A. Fib on Eliquis, TIA, Seizures, DM 2, DVT, COPD, Fungemia on Fluconazole, MRSA on Bactrim, Right Foot Osteomyelitis , AS 9/10/23 TAVR showed Valve-in-valve 26 mm Evolut FX THV in bioprosthetic aortic valve replacement with Dr. Gonsalves/ Dr. Leahy, Asthma on chronic steroids, who presents for an evaluation and management of aortic pathology. (Patient seen by Dr. Cabrera 7/22/24 and was recommended to follow up in 6 months with repeat CTA).   Patient was transferred to [facility] from [facility] (7/3 to 7/8/24) for type B aortic dissection, originally presented to [facility] for crushing chest pain described as a heaviness in her chest endorses shortness of breath . Patient received a CT angio of the chest abdomen pelvis at the outside hospital which showed a type B aortic dissection with great vessel involvement. no sign of false lumen thrombosis or true lumen compression. Previous aortic valve replacement and aortic root replacement seen. Previous AP resection of the rectum and sigmoid colon. Patient denies any chest pain at this time. States she had a couple episodes of vomiting yesterday. Patient was also found to be COVID-positive incidentally. EMS vitals on arrival were 124/74, labetalol drip initiated .7/6 NSR w brief acc jun, + covid isolation ,Nifedipine 30 qd hydral 25 q8, labetolol 100 q8 prednisone 12MG QD 7/7 vascular following, blood pressure regimen maintained 7/8 RSR/SB 55-75; htn medication adjusted; bp controlled today sbp 120's; home insulin dosages adjusted as per Endo. Patient discharged home and instructed to resume plavix and eliquis.  Today she presents and reports that dyspnea on exertion with walking, dizziness and pedal edema. She can walk up to 5 feet and need to stop due to shortness of breath. She uses 1 pillow to sleep. Denies any chest pain, back pain or PND.   CTA 8/4/24:No significant interval change compared to 8/2/2024. Moderate to large colonic stool burden. Indeterminate left upper pole renal lesion, similar to prior exams. Nonemergent renal ultrasound or MRI is recommended for further characterization. Similar chronic aortic dissection involving the thoracic and abdominal aorta.    I have reviewed the patient's medical records, diagnostic images during the time of this office consultation and have made the following recommendation. The report was reviewed and noted in the chart,  Dr. Smith independently reviewed and interpreted images and report. The surgical repair is intact and stable.       Plan   1. [facility] year with CTA C/A/P . 2. Continue medication regimen. 3. Follow up with cardiologist and PCP. 4. BP control- I have recommended the patient to monitor his blood pressure closely. I have also advised the patient to take daily blood pressures at home and adhere to medication regimen. 5. Discussed signs and symptoms that warrant emergency medical attention  .

## 2024-09-13 NOTE — PROCEDURE
[FreeTextEntry1] : Dr. Smith reviewed the indications for surgery, and used our webpage [email or web address].org <[email or web address].heartprocedures.org> to illustrate the aorta and anatomy of the heart. Those indications are the following: size greater than 5.0 cm, symptomatic aneurysms, family history of aortic dissection or aneurysm death with a size greater than 4.5 cm, other necessary cardiac procedures such as coronary artery bypass grafting or valvular disorders with an aneurysm greater than 4.5 cm, or connective tissue disorders with an aneurysm size greater than 4.5 cm. The patient does not meet size criteria for surgical intervention at the time.  Dr. Smith discussed activity restrictions with the patient, and would advise exercise at a moderate amount with no heavy lifting over one third of body weight, and avoiding heart rates that exceed 140 beats per minute. In addition, every patient should abstain from tobacco abuse and to avoid all illicit drug use, especially stimulants such as cocaine or methamphetamine. Dr. Smith also counseled regarding maintaining a healthy heart diet, and losing any excessive weight as this also put undue stress on both the aorta and entire cardiovascular system. First degree family members should be screened for bicuspid valve disease, and ascending aortic aneurysms.   Patient was advised to view the educational video prior to this visit regarding aortic pathology, risk factors, surgical procedures, and lifestyle modifications. Video can be retrieved at [email or web address]=youtu.be.

## 2024-09-13 NOTE — HISTORY OF PRESENT ILLNESS
[FreeTextEntry1] : 73 year old female with recurrent hospitalization and complex medical history including Adrenal Insufficiency, Type I Aortic Dissection s/p Bentall Procedure / Hemiarch Replacement on 9/7/22 with  , Tracheobroncho malacia, Colon Cancer s/p Resection with Colostomy, Paroxysmal A. Fib on Eliquis, TIA, Seizures, DM 2, DVT, COPD, Fungemia on Fluconazole, MRSA on Bactrim, Right Foot Osteomyelitis , AS 9/10/23 TAVR showed Valve-in-valve 26 mm Evolut FX THV in bioprosthetic aortic valve replacement with Dr. Gonsalves/ Dr. Leahy, Asthma on chronic steroids, who presents for an evaluation and management of aortic pathology. (Patient seen by Dr. Cabrera 7/22/24 and was recommended to follow up in 6 months with repeat CTA).   Patient was transferred to [facility] from [facility] (7/3 to 7/8/24) for type B aortic dissection, originally presented to [facility] for crushing chest pain described as a heaviness in her chest endorses shortness of breath . Patient received a CT angio of the chest abdomen pelvis at the outside hospital which showed a type B aortic dissection with great vessel involvement. no sign of false lumen thrombosis or true lumen compression. Previous aortic valve replacement and aortic root replacement seen. Previous AP resection of the rectum and sigmoid colon. Patient denies any chest pain at this time. States she had a couple episodes of vomiting yesterday. Patient was also found to be COVID-positive incidentally. EMS vitals on arrival were 124/74, labetalol drip initiated .7/6 NSR w brief acc jun, + covid isolation ,Nifedipine 30 qd hydral 25 q8, labetolol 100 q8 prednisone 12MG QD 7/7 vascular following, blood pressure regimen maintained 7/8 RSR/SB 55-75; htn medication adjusted; bp controlled today sbp 120's; home insulin dosages adjusted as per Endo. Patient discharged home and instructed to resume plavix and eliquis.  Today she presents and reports that dyspnea on exertion with walking, dizziness and pedal edema. She can walk up to 5 feet and need to stop due to shortness of breath. She uses 1 pillow to sleep. Denies any chest pain, back pain or PND.   CTA 8/4/24:No significant interval change compared to 8/2/2024. Moderate to large colonic stool burden. Indeterminate left upper pole renal lesion, similar to prior exams. Nonemergent renal ultrasound or MRI is recommended for further characterization. Similar chronic aortic dissection involving the thoracic and abdominal aorta.

## 2024-09-13 NOTE — ASSESSMENT
[FreeTextEntry1] :  73 year old female with recurrent hospitalization and complex medical history including Adrenal Insufficiency, Type I Aortic Dissection s/p Bentall Procedure / Hemiarch Replacement on 9/7/22 with  , Tracheobroncho malacia, Colon Cancer s/p Resection with Colostomy, Paroxysmal A. Fib on Eliquis, TIA, Seizures, DM 2, DVT, COPD, Fungemia on Fluconazole, MRSA on Bactrim, Right Foot Osteomyelitis , AS 9/10/23 TAVR showed Valve-in-valve 26 mm Evolut FX THV in bioprosthetic aortic valve replacement with Dr. Gonsalves/ Dr. Leahy, Asthma on chronic steroids, who presents for an evaluation and management of aortic pathology. (Patient seen by Dr. Cabrera 7/22/24 and was recommended to follow up in 6 months with repeat CTA).   Patient was transferred to [facility] from [facility] (7/3 to 7/8/24) for type B aortic dissection, originally presented to [facility] for crushing chest pain described as a heaviness in her chest endorses shortness of breath . Patient received a CT angio of the chest abdomen pelvis at the outside hospital which showed a type B aortic dissection with great vessel involvement. no sign of false lumen thrombosis or true lumen compression. Previous aortic valve replacement and aortic root replacement seen. Previous AP resection of the rectum and sigmoid colon. Patient denies any chest pain at this time. States she had a couple episodes of vomiting yesterday. Patient was also found to be COVID-positive incidentally. EMS vitals on arrival were 124/74, labetalol drip initiated .7/6 NSR w brief acc jun, + covid isolation ,Nifedipine 30 qd hydral 25 q8, labetolol 100 q8 prednisone 12MG QD 7/7 vascular following, blood pressure regimen maintained 7/8 RSR/SB 55-75; htn medication adjusted; bp controlled today sbp 120's; home insulin dosages adjusted as per Endo. Patient discharged home and instructed to resume plavix and eliquis.  Today she presents and reports that dyspnea on exertion with walking, dizziness and pedal edema. She can walk up to 5 feet and need to stop due to shortness of breath. She uses 1 pillow to sleep. Denies any chest pain, back pain or PND.   CTA 8/4/24:No significant interval change compared to 8/2/2024. Moderate to large colonic stool burden. Indeterminate left upper pole renal lesion, similar to prior exams. Nonemergent renal ultrasound or MRI is recommended for further characterization. Similar chronic aortic dissection involving the thoracic and abdominal aorta.    I have reviewed the patient's medical records, diagnostic images during the time of this office consultation and have made the following recommendation. The report was reviewed and noted in the chart,  Dr. Smith independently reviewed and interpreted images and report. The surgical repair is intact and stable.       Plan   1. Follow up in [facility] in 1 year with CTA C/A/P . 2. Continue medication regimen. 3. Follow up with cardiologist and PCP. 4. BP control- I have recommended the patient to monitor his blood pressure closely. I have also advised the patient to take daily blood pressures at home and adhere to medication regimen. 5. Discussed signs and symptoms that warrant emergency medical attention  .

## 2024-09-13 NOTE — HISTORY OF PRESENT ILLNESS
[FreeTextEntry1] : 73 year old female with recurrent hospitalization and complex medical history including Adrenal Insufficiency, Type I Aortic Dissection s/p Bentall Procedure / Hemiarch Replacement on 9/7/22 with  , Tracheobroncho malacia, Colon Cancer s/p Resection with Colostomy, Paroxysmal A. Fib on Eliquis, TIA, Seizures, DM 2, DVT, COPD, Fungemia on Fluconazole, MRSA on Bactrim, Right Foot Osteomyelitis , AS 9/10/23 TAVR showed Valve-in-valve 26 mm Evolut FX THV in bioprosthetic aortic valve replacement with Dr. Gonsalves/ Dr. Leahy, Asthma on chronic steroids, who presents for an evaluation and management of aortic pathology. (Patient seen by Dr. Cabrera 7/22/24 and was recommended to follow up in 6 months with repeat CTA).   Patient was transferred to [facility] from [facility] (7/3 to 7/8/24) for type B aortic dissection, originally presented to [facility] for crushing chest pain described as a heaviness in her chest endorses shortness of breath . Patient received a CT angio of the chest abdomen pelvis at the outside hospital which showed a type B aortic dissection with great vessel involvement. no sign of false lumen thrombosis or true lumen compression. Previous aortic valve replacement and aortic root replacement seen. Previous AP resection of the rectum and sigmoid colon. Patient denies any chest pain at this time. States she had a couple episodes of vomiting yesterday. Patient was also found to be COVID-positive incidentally. EMS vitals on arrival were 124/74, labetalol drip initiated .7/6 NSR w brief acc jun, + covid isolation ,Nifedipine 30 qd hydral 25 q8, labetolol 100 q8 prednisone 12MG QD 7/7 vascular following, blood pressure regimen maintained 7/8 RSR/SB 55-75; htn medication adjusted; bp controlled today sbp 120's; home insulin dosages adjusted as per Endo. Patient discharged home and instructed to resume plavix and eliquis.  Today she presents and reports that dyspnea on exertion with walking, dizziness and pedal edema. She can walk up to 5 feet and need to stop due to shortness of breath. She uses 1 pillow to sleep. Denies any chest pain, back pain or PND.   CTA 8/4/24:No significant interval change compared to 8/2/2024. Moderate to large colonic stool burden. Indeterminate left upper pole renal lesion, similar to prior exams. Nonemergent renal ultrasound or MRI is recommended for further characterization. Similar chronic aortic dissection involving the thoracic and abdominal aorta.

## 2024-09-13 NOTE — PROCEDURE
[FreeTextEntry1] : Dr. Smith reviewed the indications for surgery, and used our webpage [email or web address] <[email or web address].heartprocedures.org> to illustrate the aorta and anatomy of the heart. Those indications are the following: size greater than 5.0 cm, symptomatic aneurysms, family history of aortic dissection or aneurysm death with a size greater than 4.5 cm, other necessary cardiac procedures such as coronary artery bypass grafting or valvular disorders with an aneurysm greater than 4.5 cm, or connective tissue disorders with an aneurysm size greater than 4.5 cm. The patient does not meet size criteria for surgical intervention at the time.  Dr. Smith discussed activity restrictions with the patient, and would advise exercise at a moderate amount with no heavy lifting over one third of body weight, and avoiding heart rates that exceed 140 beats per minute. In addition, every patient should abstain from tobacco abuse and to avoid all illicit drug use, especially stimulants such as cocaine or methamphetamine. Dr. Smith also counseled regarding maintaining a healthy heart diet, and losing any excessive weight as this also put undue stress on both the aorta and entire cardiovascular system. First degree family members should be screened for bicuspid valve disease, and ascending aortic aneurysms.   Patient was advised to view the educational video prior to this visit regarding aortic pathology, risk factors, surgical procedures, and lifestyle modifications. Video can be retrieved at [email or web address].be.

## 2024-09-13 NOTE — PROCEDURE
[FreeTextEntry1] : Dr. Smith reviewed the indications for surgery, and used our webpage [email or web address] <[email or web address].heartprocedures.org> to illustrate the aorta and anatomy of the heart. Those indications are the following: size greater than 5.0 cm, symptomatic aneurysms, family history of aortic dissection or aneurysm death with a size greater than 4.5 cm, other necessary cardiac procedures such as coronary artery bypass grafting or valvular disorders with an aneurysm greater than 4.5 cm, or connective tissue disorders with an aneurysm size greater than 4.5 cm. The patient does not meet size criteria for surgical intervention at the time.  Dr. Smith discussed activity restrictions with the patient, and would advise exercise at a moderate amount with no heavy lifting over one third of body weight, and avoiding heart rates that exceed 140 beats per minute. In addition, every patient should abstain from tobacco abuse and to avoid all illicit drug use, especially stimulants such as cocaine or methamphetamine. Dr. Smith also counseled regarding maintaining a healthy heart diet, and losing any excessive weight as this also put undue stress on both the aorta and entire cardiovascular system. First degree family members should be screened for bicuspid valve disease, and ascending aortic aneurysms.   Patient was advised to view the educational video prior to this visit regarding aortic pathology, risk factors, surgical procedures, and lifestyle modifications. Video can be retrieved at [email or web address].

## 2024-09-13 NOTE — HISTORY OF PRESENT ILLNESS
[FreeTextEntry1] : 73 year old female with recurrent hospitalization and complex medical history including Adrenal Insufficiency, Type I Aortic Dissection s/p Bentall Procedure / Hemiarch Replacement on 9/7/22 with  , Tracheobroncho malacia, Colon Cancer s/p Resection with Colostomy, Paroxysmal A. Fib on Eliquis, TIA, Seizures, DM 2, DVT, COPD, Fungemia on Fluconazole, MRSA on Bactrim, Right Foot Osteomyelitis , AS 9/10/23 TAVR showed Valve-in-valve 26 mm Evolut FX THV in bioprosthetic aortic valve replacement with Dr. Gonsalves/ Dr. Leahy, Asthma on chronic steroids, who presents for an evaluation and management of aortic pathology. (Patient seen by Dr. Cabrera 7/22/24 and was recommended to follow up in 6 months with repeat CTA).   Patient was transferred to [facility] from [facility] (7/3 to 7/8/24) for type B aortic dissection, originally presented to [facility] for crushing chest pain described as a heaviness in her chest endorses shortness of breath . Patient received a CT angio of the chest abdomen pelvis at the outside hospital which showed a type B aortic dissection with great vessel involvement. no sign of false lumen thrombosis or true lumen compression. Previous aortic valve replacement and aortic root replacement seen. Previous AP resection of the rectum and sigmoid colon. Patient denies any chest pain at this time. States she had a couple episodes of vomiting yesterday. Patient was also found to be COVID-positive incidentally. EMS vitals on arrival were 124/74, labetalol drip initiated .7/6 NSR w brief acc jun, + covid isolation ,Nifedipine 30 qd hydral 25 q8, labetolol 100 q8 prednisone 12MG QD 7/7 vascular following, blood pressure regimen maintained 7/8 RSR/SB 55-75; htn medication adjusted; bp controlled today sbp 120's; home insulin dosages adjusted as per Endo. Patient discharged home and instructed to resume plavix and eliquis.  Today she presents and reports that dyspnea on exertion with walking, dizziness and pedal edema. She can walk up to 5 feet and need to stop due to shortness of breath. She uses 1 pillow to sleep. Denies any chest pain, back pain or PND.   CTA 8/4/24:No significant interval change compared to 8/2/2024. Moderate to large colonic stool burden. Indeterminate left upper pole renal lesion, similar to prior exams. Nonemergent renal ultrasound or MRI is recommended for further characterization. Similar chronic aortic dissection involving the thoracic and abdominal aorta.

## 2024-09-13 NOTE — HISTORY OF PRESENT ILLNESS
[FreeTextEntry1] : 73 year old female with recurrent hospitalization and complex medical history including Adrenal Insufficiency, Type I Aortic Dissection s/p Bentall Procedure / Hemiarch Replacement on 9/7/22 with  , Tracheobroncho malacia, Colon Cancer s/p Resection with Colostomy, Paroxysmal A. Fib on Eliquis, TIA, Seizures, DM 2, DVT, COPD, Fungemia on Fluconazole, MRSA on Bactrim, Right Foot Osteomyelitis , AS 9/10/23 TAVR showed Valve-in-valve 26 mm Evolut FX THV in bioprosthetic aortic valve replacement with Dr. Gonsalves/ Dr. Leahy, Asthma on chronic steroids, who presents for an evaluation and management of aortic pathology. (Patient seen by Dr. Cabrera 7/22/24 and was recommended to follow up in 6 months with repeat CTA).   Patient was transferred to [facility] from [facility] (7/3 to 7/8/24) for type B aortic dissection, originally presented to [facility] for crushing chest pain described as a heaviness in her chest endorses shortness of breath . Patient received a CT angio of the chest abdomen pelvis at the outside hospital which showed a type B aortic dissection with great vessel involvement. no sign of false lumen thrombosis or true lumen compression. Previous aortic valve replacement and aortic root replacement seen. Previous AP resection of the rectum and sigmoid colon. Patient denies any chest pain at this time. States she had a couple episodes of vomiting yesterday. Patient was also found to be COVID-positive incidentally. EMS vitals on arrival were 124/74, labetalol drip initiated .7/6 NSR w brief acc jun, + covid isolation ,Nifedipine 30 qd hydral 25 q8, labetolol 100 q8 prednisone 12MG QD 7/7 vascular following, blood pressure regimen maintained 7/8 RSR/SB 55-75; htn medication adjusted; bp controlled today sbp 120's; home insulin dosages adjusted as per Endo. Patient discharged home and instructed to resume plavix and eliquis.  Today she presents and reports that dyspnea on exertion with walking, dizziness and pedal edema. She can walk up to 5 feet and need to stop due to shortness of breath. She uses 1 pillow to sleep. Denies any chest pain, back pain or PND.   CTA 8/4/24:No significant interval change compared to 8/2/2024. Moderate to large colonic stool burden. Indeterminate left upper pole renal lesion, similar to prior exams. Nonemergent renal ultrasound or MRI is recommended for further characterization. Similar chronic aortic dissection involving the thoracic and abdominal aorta.

## 2024-09-13 NOTE — PHYSICAL EXAM
[Sclera] : the sclera and conjunctiva were normal [PERRL With Normal Accommodation] : pupils were equal in size, round, and reactive to light [Neck Appearance] : the appearance of the neck was normal [Respiration, Rhythm And Depth] : normal respiratory rhythm and effort [] : no respiratory distress [Auscultation Breath Sounds / Voice Sounds] : lungs were clear to auscultation bilaterally [Apical Impulse] : the apical impulse was normal [Heart Rate And Rhythm] : heart rate was normal and rhythm regular [Heart Sounds] : normal S1 and S2 [Murmurs] : no murmurs [Examination Of The Chest] : the chest was normal in appearance [2+] : left 2+ [Breast Appearance] : normal in appearance [Bowel Sounds] : normal bowel sounds [Abdomen Soft] : soft [No CVA Tenderness] : no ~M costovertebral angle tenderness [Abnormal Walk] : normal gait [Involuntary Movements] : no involuntary movements were seen [Skin Color & Pigmentation] : normal skin color and pigmentation [Skin Turgor] : normal skin turgor [No Focal Deficits] : no focal deficits [Oriented To Time, Place, And Person] : oriented to person, place, and time [Impaired Insight] : insight and judgment were intact [Affect] : the affect was normal [Mood] : the mood was normal [Memory Recent] : recent memory was not impaired [Memory Remote] : remote memory was not impaired [FreeTextEntry1] : Deferred

## 2024-09-13 NOTE — PHYSICAL EXAM
[Sclera] : the sclera and conjunctiva were normal [PERRL With Normal Accommodation] : pupils were equal in size, round, and reactive to light [Neck Appearance] : the appearance of the neck was normal [Respiration, Rhythm And Depth] : normal respiratory rhythm and effort [] : no respiratory distress [Auscultation Breath Sounds / Voice Sounds] : lungs were clear to auscultation bilaterally [Apical Impulse] : the apical impulse was normal [Heart Rate And Rhythm] : heart rate was normal and rhythm regular [Heart Sounds] : normal S1 and S2 [Murmurs] : no murmurs [Examination Of The Chest] : the chest was normal in appearance [2+] : left 2+ [Breast Appearance] : normal in appearance [Bowel Sounds] : normal bowel sounds [Abdomen Soft] : soft [No CVA Tenderness] : no ~M costovertebral angle tenderness [Abnormal Walk] : normal gait [Involuntary Movements] : no involuntary movements were seen [Skin Color & Pigmentation] : normal skin color and pigmentation [Skin Turgor] : normal skin turgor [No Focal Deficits] : no focal deficits [Oriented To Time, Place, And Person] : oriented to person, place, and time [Impaired Insight] : insight and judgment were intact [Mood] : the mood was normal [Affect] : the affect was normal [Memory Recent] : recent memory was not impaired [Memory Remote] : remote memory was not impaired [FreeTextEntry1] : Deferred

## 2024-09-13 NOTE — END OF VISIT
[FreeTextEntry3] :  I, GILL Anderson , personally performed the evaluation and management (E/M) services for this established  patient. That E/M includes conducting the initial examination, assessing all conditions, and establishing the plan of care. Today, LEEANNA BETHEA  was here to observe my evaluation and management services for this patient.

## 2024-09-13 NOTE — HISTORY OF PRESENT ILLNESS
[FreeTextEntry1] : 73 year old female with recurrent hospitalization and complex medical history including Adrenal Insufficiency, Type I Aortic Dissection s/p Bentall Procedure / Hemiarch Replacement on 9/7/22 with  , Tracheobroncho malacia, Colon Cancer s/p Resection with Colostomy, Paroxysmal A. Fib on Eliquis, TIA, Seizures, DM 2, DVT, COPD, Fungemia on Fluconazole, MRSA on Bactrim, Right Foot Osteomyelitis , AS 9/10/23 TAVR showed Valve-in-valve 26 mm Evolut FX THV in bioprosthetic aortic valve replacement with Dr. Gonsalves/ Dr. Leahy, Asthma on chronic steroids, who presents for an evaluation and management of aortic pathology. (Patient seen by Dr. Cabrera 7/22/24 and was recommended to follow up in 6 months with repeat CTA).   Patient was transferred to [facility] from [facility] (7/3 to 7/8/24) for type B aortic dissection, originally presented to [facility] for crushing chest pain described as a heaviness in her chest endorses shortness of breath . Patient received a CT angio of the chest abdomen pelvis at the outside hospital which showed a type B aortic dissection with great vessel involvement. no sign of false lumen thrombosis or true lumen compression. Previous aortic valve replacement and aortic root replacement seen. Previous AP resection of the rectum and sigmoid colon. Patient denies any chest pain at this time. States she had a couple episodes of vomiting yesterday. Patient was also found to be COVID-positive incidentally. EMS vitals on arrival were 124/74, labetalol drip initiated .7/6 NSR w brief acc jun, + covid isolation ,Nifedipine 30 qd hydral 25 q8, labetolol 100 q8 prednisone 12MG QD 7/7 vascular following, blood pressure regimen maintained 7/8 RSR/SB 55-75; htn medication adjusted; bp controlled today sbp 120's; home insulin dosages adjusted as per Endo. Patient discharged home and instructed to resume plavix and eliquis.  Today she presents and reports that dyspnea on exertion with walking, dizziness and pedal edema. She can walk up to 5 feet and need to stop due to shortness of breath. She uses 1 pillow to sleep. Denies any chest pain, back pain or PND.   CTA 8/4/24:No significant interval change compared to 8/2/2024. Moderate to large colonic stool burden. Indeterminate left upper pole renal lesion, similar to prior exams. Nonemergent renal ultrasound or MRI is recommended for further characterization. Similar chronic aortic dissection involving the thoracic and abdominal aorta.

## 2024-09-13 NOTE — ASSESSMENT
[FreeTextEntry1] :  73 year old female with recurrent hospitalization and complex medical history including Adrenal Insufficiency, Type I Aortic Dissection s/p Bentall Procedure / Hemiarch Replacement on 9/7/22 with  , Tracheobroncho malacia, Colon Cancer s/p Resection with Colostomy, Paroxysmal A. Fib on Eliquis, TIA, Seizures, DM 2, DVT, COPD, Fungemia on Fluconazole, MRSA on Bactrim, Right Foot Osteomyelitis , AS 9/10/23 TAVR showed Valve-in-valve 26 mm Evolut FX THV in bioprosthetic aortic valve replacement with Dr. Gonsalves/ Dr. Leahy, Asthma on chronic steroids, who presents for an evaluation and management of aortic pathology. (Patient seen by Dr. Cabrera 7/22/24 and was recommended to follow up in 6 months with repeat CTA).   Patient was transferred to [facility] from [facility] (7/3 to 7/8/24) for type B aortic dissection, originally presented to [facility] for crushing chest pain described as a heaviness in her chest endorses shortness of breath . Patient received a CT angio of the chest abdomen pelvis at the outside hospital which showed a type B aortic dissection with great vessel involvement. no sign of false lumen thrombosis or true lumen compression. Previous aortic valve replacement and aortic root replacement seen. Previous AP resection of the rectum and sigmoid colon. Patient denies any chest pain at this time. States she had a couple episodes of vomiting yesterday. Patient was also found to be COVID-positive incidentally. EMS vitals on arrival were 124/74, labetalol drip initiated .7/6 NSR w brief acc jun, + covid isolation ,Nifedipine 30 qd hydral 25 q8, labetolol 100 q8 prednisone 12MG QD 7/7 vascular following, blood pressure regimen maintained 7/8 RSR/SB 55-75; htn medication adjusted; bp controlled today sbp 120's; home insulin dosages adjusted as per Endo. Patient discharged home and instructed to resume plavix and eliquis.  Today she presents and reports that dyspnea on exertion with walking, dizziness and pedal edema. She can walk up to 5 feet and need to stop due to shortness of breath. She uses 1 pillow to sleep. Denies any chest pain, back pain or PND.   CTA 8/4/24:No significant interval change compared to 8/2/2024. Moderate to large colonic stool burden. Indeterminate left upper pole renal lesion, similar to prior exams. Nonemergent renal ultrasound or MRI is recommended for further characterization. Similar chronic aortic dissection involving the thoracic and abdominal aorta.    I have reviewed the patient's medical records, diagnostic images during the time of this office consultation and have made the following recommendation. The report was reviewed and noted in the chart,  Dr. Smith independently reviewed and interpreted images and report. The surgical repair is intact and stable.       Plan   1. [facility] with CTA C/A/P . 2. Continue medication regimen. 3. Follow up with cardiologist and PCP. 4. BP control- I have recommended the patient to monitor his blood pressure closely. I have also advised the patient to take daily blood pressures at home and adhere to medication regimen. 5. Discussed signs and symptoms that warrant emergency medical attention  .

## 2024-09-13 NOTE — PROCEDURE
[FreeTextEntry1] : Dr. Smith reviewed the indications for surgery, and used our webpage [email or web address] <[email or web address]> to illustrate the aorta and anatomy of the heart. Those indications are the following: size greater than 5.0 cm, symptomatic aneurysms, family history of aortic dissection or aneurysm death with a size greater than 4.5 cm, other necessary cardiac procedures such as coronary artery bypass grafting or valvular disorders with an aneurysm greater than 4.5 cm, or connective tissue disorders with an aneurysm size greater than 4.5 cm. The patient does not meet size criteria for surgical intervention at the time.  Dr. Smith discussed activity restrictions with the patient, and would advise exercise at a moderate amount with no heavy lifting over one third of body weight, and avoiding heart rates that exceed 140 beats per minute. In addition, every patient should abstain from tobacco abuse and to avoid all illicit drug use, especially stimulants such as cocaine or methamphetamine. Dr. Smith also counseled regarding maintaining a healthy heart diet, and losing any excessive weight as this also put undue stress on both the aorta and entire cardiovascular system. First degree family members should be screened for bicuspid valve disease, and ascending aortic aneurysms.   Patient was advised to view the educational video prior to this visit regarding aortic pathology, risk factors, surgical procedures, and lifestyle modifications. Video can be retrieved at https://www.[employer].com/watch?[email or web address]&feature=youtu.be.

## 2024-09-13 NOTE — DATA REVIEWED
[FreeTextEntry1] : 7/3/24 TTE revealed 1. Left ventricular cavity is normal in size. Left ventricular wall thickness is normal. Left ventricular systolic function is hyperdynamic with an ejection fraction of 75 % by Felipe's method of disks.  2. Normal right ventricular cavity size, with normal wall thickness, and normal right ventricular systolic function. Tricuspid annular plane systolic excursion (TAPSE) is 1.6 cm (normal >=1.7 cm).  3. No pericardial effusion seen.  4. Compared to the transthoracic echocardiogram performed on 9/10/2023, there have been no significant interval changes.  5. There is increased LV mass and concentric hypertrophy. 6. 26 mm Medtronic Evolut FX (valve-in-valve) is present in the aortic position. There is no intravalvular regurgitation. There is no paravalvular regurgitation. There is no aortic valve stenosis. The peak transaortic velocity is 2.61 m/s, peak transaortic gradient is 27.2 mmHg and mean transaortic gradient is 14.0 mmHg with an LVOT/aortic valve VTI ratio of 0.57.   7/2/24 CTA CAP revealed Type B aortic dissection with great vessel involvement. All great vessels arise from the true lumen. There is no signs of false lumen thrombosis or true lumen compression. Previous AVR and aortic root replacement. Mild RML and RLL bronchiolitis. 1.8 cm pancreatic tail cyst. Additional smaller cyst. Previous AP resection of the rectum and sigmoid colon.  ACR white paper guidelines suggest a contrast enhanced, pancreas- protocol abdominal CT or MR in 6 months or endoscopic ultrasound with fine needle aspiration.

## 2024-09-13 NOTE — PROCEDURE
[FreeTextEntry1] : Dr. Smith reviewed the indications for surgery, and used our webpage [email or web address] <[email or web address]> to illustrate the aorta and anatomy of the heart. Those indications are the following: size greater than 5.0 cm, symptomatic aneurysms, family history of aortic dissection or aneurysm death with a size greater than 4.5 cm, other necessary cardiac procedures such as coronary artery bypass grafting or valvular disorders with an aneurysm greater than 4.5 cm, or connective tissue disorders with an aneurysm size greater than 4.5 cm. The patient does not meet size criteria for surgical intervention at the time.  Dr. Smith discussed activity restrictions with the patient, and would advise exercise at a moderate amount with no heavy lifting over one third of body weight, and avoiding heart rates that exceed 140 beats per minute. In addition, every patient should abstain from tobacco abuse and to avoid all illicit drug use, especially stimulants such as cocaine or methamphetamine. Dr. Smith also counseled regarding maintaining a healthy heart diet, and losing any excessive weight as this also put undue stress on both the aorta and entire cardiovascular system. First degree family members should be screened for bicuspid valve disease, and ascending aortic aneurysms.   Patient was advised to view the educational video prior to this visit regarding aortic pathology, risk factors, surgical procedures, and lifestyle modifications. Video can be retrieved at https://www.[employer].com/watch?v=[email or web address]&feature=youtu.be.

## 2024-09-13 NOTE — ASSESSMENT
[FreeTextEntry1] :  73 year old female with recurrent hospitalization and complex medical history including Adrenal Insufficiency, Type I Aortic Dissection s/p Bentall Procedure / Hemiarch Replacement on 9/7/22 with  , Tracheobroncho malacia, Colon Cancer s/p Resection with Colostomy, Paroxysmal A. Fib on Eliquis, TIA, Seizures, DM 2, DVT, COPD, Fungemia on Fluconazole, MRSA on Bactrim, Right Foot Osteomyelitis , AS 9/10/23 TAVR showed Valve-in-valve 26 mm Evolut FX THV in bioprosthetic aortic valve replacement with Dr. Gonsalves/ Dr. Leahy, Asthma on chronic steroids, who presents for an evaluation and management of aortic pathology. (Patient seen by Dr. Cabrera 7/22/24 and was recommended to follow up in 6 months with repeat CTA).   Patient was transferred to [facility] from [facility] (7/3 to 7/8/24) for type B aortic dissection, originally presented to [facility] for crushing chest pain described as a heaviness in her chest endorses shortness of breath . Patient received a CT angio of the chest abdomen pelvis at the outside hospital which showed a type B aortic dissection with great vessel involvement. no sign of false lumen thrombosis or true lumen compression. Previous aortic valve replacement and aortic root replacement seen. Previous AP resection of the rectum and sigmoid colon. Patient denies any chest pain at this time. States she had a couple episodes of vomiting yesterday. Patient was also found to be COVID-positive incidentally. EMS vitals on arrival were 124/74, labetalol drip initiated .7/6 NSR w brief acc jun, + covid isolation ,Nifedipine 30 qd hydral 25 q8, labetolol 100 q8 prednisone 12MG QD 7/7 vascular following, blood pressure regimen maintained 7/8 RSR/SB 55-75; htn medication adjusted; bp controlled today sbp 120's; home insulin dosages adjusted as per Endo. Patient discharged home and instructed to resume plavix and eliquis.  Today she presents and reports that dyspnea on exertion with walking, dizziness and pedal edema. She can walk up to 5 feet and need to stop due to shortness of breath. She uses 1 pillow to sleep. Denies any chest pain, back pain or PND.   CTA 8/4/24:No significant interval change compared to 8/2/2024. Moderate to large colonic stool burden. Indeterminate left upper pole renal lesion, similar to prior exams. Nonemergent renal ultrasound or MRI is recommended for further characterization. Similar chronic aortic dissection involving the thoracic and abdominal aorta.    I have reviewed the patient's medical records, diagnostic images during the time of this office consultation and have made the following recommendation. The report was reviewed and noted in the chart,  Dr. Smith independently reviewed and interpreted images and report. The surgical repair is intact and stable.       Plan   1. Follow up in [facility] 1 year with CTA C/A/P . 2. Continue medication regimen. 3. Follow up with cardiologist and PCP. 4. BP control- I have recommended the patient to monitor his blood pressure closely. I have also advised the patient to take daily blood pressures at home and adhere to medication regimen. 5. Discussed signs and symptoms that warrant emergency medical attention  .

## 2024-09-16 ENCOUNTER — APPOINTMENT (OUTPATIENT)
Dept: NEUROLOGY | Facility: CLINIC | Age: 74
End: 2024-09-16

## 2024-09-16 PROCEDURE — 99214 OFFICE O/P EST MOD 30 MIN: CPT

## 2024-09-16 RX ORDER — LACOSAMIDE 100 MG/1
100 TABLET ORAL
Qty: 60 | Refills: 5 | Status: ACTIVE | COMMUNITY
Start: 2024-09-16 | End: 1900-01-01

## 2024-09-16 RX ORDER — SODIUM CHLORIDE SOLN NEBU 7% 7 %
7 NEBU SOLN INHALATION
Qty: 240 | Refills: 3 | Status: ACTIVE | COMMUNITY
Start: 2024-09-12 | End: 1900-01-01

## 2024-09-16 RX ORDER — LEVETIRACETAM 500 MG/1
500 TABLET, FILM COATED, EXTENDED RELEASE ORAL
Qty: 60 | Refills: 5 | Status: ACTIVE | COMMUNITY
Start: 2024-09-16 | End: 1900-01-01

## 2024-09-18 ENCOUNTER — APPOINTMENT (OUTPATIENT)
Dept: INTERNAL MEDICINE | Facility: CLINIC | Age: 74
End: 2024-09-18
Payer: MEDICARE

## 2024-09-18 ENCOUNTER — OUTPATIENT (OUTPATIENT)
Dept: OUTPATIENT SERVICES | Facility: HOSPITAL | Age: 74
LOS: 1 days | End: 2024-09-18
Payer: MEDICARE

## 2024-09-18 VITALS
OXYGEN SATURATION: 97 % | HEART RATE: 66 BPM | WEIGHT: 164 LBS | SYSTOLIC BLOOD PRESSURE: 130 MMHG | DIASTOLIC BLOOD PRESSURE: 70 MMHG | HEIGHT: 67 IN | BODY MASS INDEX: 25.74 KG/M2

## 2024-09-18 DIAGNOSIS — Z98.890 OTHER SPECIFIED POSTPROCEDURAL STATES: Chronic | ICD-10-CM

## 2024-09-18 DIAGNOSIS — Z87.09 PERSONAL HISTORY OF OTHER DISEASES OF THE RESPIRATORY SYSTEM: Chronic | ICD-10-CM

## 2024-09-18 DIAGNOSIS — Z96.653 PRESENCE OF ARTIFICIAL KNEE JOINT, BILATERAL: Chronic | ICD-10-CM

## 2024-09-18 DIAGNOSIS — I10 ESSENTIAL (PRIMARY) HYPERTENSION: ICD-10-CM

## 2024-09-18 DIAGNOSIS — Z98.89 OTHER SPECIFIED POSTPROCEDURAL STATES: Chronic | ICD-10-CM

## 2024-09-18 DIAGNOSIS — R63.4 ABNORMAL WEIGHT LOSS: ICD-10-CM

## 2024-09-18 DIAGNOSIS — K62.5 HEMORRHAGE OF ANUS AND RECTUM: Chronic | ICD-10-CM

## 2024-09-18 DIAGNOSIS — H05.352 EXOSTOSIS OF LEFT ORBIT: Chronic | ICD-10-CM

## 2024-09-18 DIAGNOSIS — R63.5 ABNORMAL WEIGHT GAIN: ICD-10-CM

## 2024-09-18 DIAGNOSIS — F41.9 ANXIETY DISORDER, UNSPECIFIED: ICD-10-CM

## 2024-09-18 DIAGNOSIS — Z95.2 PRESENCE OF PROSTHETIC HEART VALVE: Chronic | ICD-10-CM

## 2024-09-18 PROCEDURE — G2211 COMPLEX E/M VISIT ADD ON: CPT

## 2024-09-18 PROCEDURE — 99214 OFFICE O/P EST MOD 30 MIN: CPT

## 2024-09-18 PROCEDURE — G0463: CPT

## 2024-09-18 RX ORDER — SERTRALINE HYDROCHLORIDE 50 MG/1
50 TABLET, FILM COATED ORAL
Qty: 30 | Refills: 1 | Status: ACTIVE | COMMUNITY
Start: 2024-09-18 | End: 1900-01-01

## 2024-09-18 NOTE — REVIEW OF SYSTEMS
[Chest Pain] : no chest pain [Palpitations] : no palpitations [Lower Ext Edema] : no lower extremity edema [Orthopnea] : no orthopnea [Shortness Of Breath] : shortness of breath [Cough] : cough [Dyspnea on Exertion] : dyspnea on exertion [Negative] : Constitutional [FreeTextEntry6] : see hpi

## 2024-09-18 NOTE — HISTORY OF PRESENT ILLNESS
[FreeTextEntry1] : Here for complicated updates, and to talk about her 'crankiness' - see hpi  [de-identified] : Mostly talked about her spirits/mood/'crankiness' as per her daughter who she lives with/anxiety.  Admits she's bothered more by fact 'I can drop dead tomorrow'.  Knows the degree of her lung disease, the questions about ?seizure disorder vs some brain change bothering her memory, word finding and balance make her 'a pretty sick lady'.  When her mind drifts to that, she then thinks over and over again about family members, and her two husbands, who have  and how they .  She feels very anxious at times, and confirms her daughter is right - she gets jumpy and ornery more than she used to; immediately feels bad because 'that's not me'.  Did not start paroxetine considered by colleague here in visit earlier this year.  Now wants to revisit trying something to lift her spirits and ease her anxiety.    Rest of conversation centered on 'colonization vs infection' in terms of her lungs and sputum cultures.  Continues with Dr. Allison/pulm after having had an ID consult.  Does think her breathing has been better lately with some recent med adjustments.  Also updates me on Dr. Horner's thoughts of changing keppra to lacosamide gradually - she'll also need a repeat EEG, as there is still some consideration for a seizure disorder underpinning some of her neurologic changes/exacerbations.  See prior notes, and neuro notes.    Also bothered by recent weight gain she doesn't understand, doesn't think steroid contributing bc on for many years, and dose not recently changed.  Lastly asking for ophtho, having visual changes

## 2024-09-18 NOTE — ASSESSMENT
[FreeTextEntry1] : 1) encouraged flu vacc at pharmacy 2-4 weeks; not going to take updated covid vacc, did have case in last 6 mos.  Prevnar recently boosted.  2) anxiety/depression re her medical problems - would like to trial SSRI; agreeing to sertraline 50 mg, 1/2 tab daily x 1 week, then 50 mg tab.  Will then f/u/discuss response/titration in 4-6 weeks.  3) requests ophtho - having visual change, wants full check.  4) checking TSH w next blood draw to be careful no contribution to the recent weight gain.  5) current nebulizers and breo rxd by pulmonary making her feel subjectively with best breathing she's had in a time, having less intense KRAMER by her report.    34 minutes were spent in preparation of this visit, including review of previous notes and test results, interview and examination of the patient, communication with other care contributors, discussion of the plan, arranging for appropriate testing and treatment, and documentation.
no

## 2024-09-18 NOTE — PHYSICAL EXAM
[No Acute Distress] : no acute distress [Well Nourished] : well nourished [Well Developed] : well developed [Well-Appearing] : well-appearing [No JVD] : no jugular venous distention [No Lymphadenopathy] : no lymphadenopathy [Supple] : supple [No Respiratory Distress] : no respiratory distress  [No Accessory Muscle Use] : no accessory muscle use [Clear to Auscultation] : lungs were clear to auscultation bilaterally [Normal Rate] : normal rate  [Regular Rhythm] : with a regular rhythm [Normal S1, S2] : normal S1 and S2 [No Extremity Clubbing/Cyanosis] : no extremity clubbing/cyanosis [Speech Grossly Normal] : speech grossly normal [Memory Grossly Normal] : memory grossly normal [Alert and Oriented x3] : oriented to person, place, and time [Normal Mood] : the mood was normal [Normal Insight/Judgement] : insight and judgment were intact [de-identified] : no wheeze, rales, ronchi appreciated, good air movement appreciated [de-identified] : L ankle with some trace edema and stasis change/hemosiderosis

## 2024-09-19 ENCOUNTER — NON-APPOINTMENT (OUTPATIENT)
Age: 74
End: 2024-09-19

## 2024-09-24 DIAGNOSIS — H54.7 UNSPECIFIED VISUAL LOSS: ICD-10-CM

## 2024-09-24 DIAGNOSIS — R63.5 ABNORMAL WEIGHT GAIN: ICD-10-CM

## 2024-09-26 ENCOUNTER — APPOINTMENT (OUTPATIENT)
Dept: NEUROLOGY | Facility: CLINIC | Age: 74
End: 2024-09-26
Payer: MEDICARE

## 2024-09-26 PROCEDURE — 93040 RHYTHM ECG WITH REPORT: CPT

## 2024-09-26 PROCEDURE — 95816 EEG AWAKE AND DROWSY: CPT

## 2024-09-27 PROCEDURE — 95719 EEG PHYS/QHP EA INCR W/O VID: CPT

## 2024-09-27 PROCEDURE — 95700 EEG CONT REC W/VID EEG TECH: CPT

## 2024-09-27 PROCEDURE — 95708 EEG WO VID EA 12-26HR UNMNTR: CPT

## 2024-10-01 RX ORDER — ENSIFENTRINE 3 MG/2.5ML
3 SUSPENSION RESPIRATORY (INHALATION)
Qty: 1 | Refills: 11 | Status: ACTIVE | COMMUNITY
Start: 2024-10-01 | End: 1900-01-01

## 2024-10-01 RX ORDER — BLOOD-GLUCOSE METER
KIT MISCELLANEOUS
Qty: 1 | Refills: 0 | Status: ACTIVE | COMMUNITY
Start: 2024-10-01 | End: 1900-01-01

## 2024-10-03 ENCOUNTER — APPOINTMENT (OUTPATIENT)
Dept: ENDOCRINOLOGY | Facility: CLINIC | Age: 74
End: 2024-10-03

## 2024-10-04 RX ADMIN — Medication 6: at 18:46

## 2024-10-06 NOTE — ED PROVIDER NOTE - NS ED MD DISPO SPECIAL CONSIDERATION1
[facility]    History and Physical - Hospitalist Service       Date of Admission:  10/5/2024    Assessment & Plan      Dominik Rojas is a 80 year old male with past medical history of COPD on 2 to 3 L nasal cannula at home, chronic systolic heart failure, hypertension, hypothyroidism, anxiety, recent hospitalization from 9/stable to 9/16 for UTI, metabolic encephalopathy and discharged to TCU.  Patient brought to ED for evaluation of left hip pain after fall.  Patient found to have left proximal femur fracture with displacement.  Patient admitted for further management    Mechanical fall per history;  Acute comminuted left proximal femur fracture;  -- Patient reported he was on a recliner chair when he tried to get up and turn then slide forward and fall.  Denied chest pain, dizziness, shortness of breath prior to or after fall  --Bedrest.  Pain management.  Orthopedic team consulted    Preop evaluation;  -- Patient with multiple comorbidities including COPD with chronic hypoxia on 2 to 3 L nasal cannula, chronic systolic heart failure.  No recent echo available, echo from 8/2021 reported EF 40 to 45%, possible apical hypokinesis  --Patient does report feeling shortness of breath with ambulation but denied chest pain.    --CXR reported right pleural effusion but also reports chronic.    --Get BNP, EKG, echocardiogram, pending on these tests, may need cardiology consult for preop clearance    Chronic systolic heart failure;  --Ordered BNP, echocardiogram  -- PTA Lasix, losartan, Toprol-XL    COPD; not on exacerbation  Chronic hypoxic respiratory failure on 2-3l NC;  -- Patient reported that with nebulizer he gets persistent cough did not appreciate wheezing.    --PTA as needed nebulizer given may consider scheduling few  dosess nebulizer to optimize pulmonary status  --Added incentive spirometry, flutter valve    Leukocytosis;  -- No reported febrile illness but patient reports urinary  "frequency, dysuria  -- Patient treated for UTI recently.  Multiple culture results reviewed.  Ordered UA, if positive will start antibiotics.  Requested RN to call MD after UA result    History of RLL adenocarcinoma s/p radiation therapy    Anxiety and depression;  Dementia and mood disorder per previous chart;  -- Currently calm and cooperative.  PTA medications    HLD, GERD, hypothyroidism, PTA medications          Diet: NPO per Anesthesia Guidelines for Procedure/Surgery Except for: Meds    DVT Prophylaxis: Pharmacological agent on hold as anticipating surgery  Cabral Catheter: Not present  Lines: None     Cardiac Monitoring: None  Code Status: Full Code      Clinically Significant Risk Factors Present on Admission              # Hypoalbuminemia: Lowest albumin = 2.8 g/dL at 10/5/2024  6:59 PM, will monitor as appropriate   # Drug Induced Platelet Defect: home medication list includes an antiplatelet medication   # Hypertension: Noted on problem list   # Dementia: noted on problem list       # Overweight: Estimated body mass index is 27.93 kg/m  as calculated from the following:    Height as of this encounter: 1.702 m (5' 7\").    Weight as of this encounter: 80.9 kg (178 lb 4.8 oz).       # Financial/Environmental Concerns:           Disposition Plan     Medically Ready for Discharge: Anticipated in 2-4 Days           Christiano Stone MD  Hospitalist Service  [facility]  Securely message with [employer] (more info)  Text page via [employer] Paging/Directory     ______________________________________________________________________    Chief Complaint   Left hip pain    History is obtained from the patient, from ED provider, reviewed previous medical records,/recent records.    History of Present Illness   Dominik Rojas is a 80 year old male with past medical history of COPD on 2 to 3 L nasal cannula at home, chronic systolic heart failure, hypertension, hypothyroidism, anxiety, recent hospitalization " from 9/stable to 9/16 for UTI, metabolic encephalopathy and discharged to TCU.  Patient brought to ED for evaluation of left hip pain after fall.  Patient found to have left proximal femur fracture with displacement.  Patient admitted for further management.    Patient was alert and awake, able to tell that he was residing at [facility] facility.  Patient reported he was sitting on the recliner  chair when he tried to get up and turn them slide forward and fell.  Patient denied feeling dizziness, chest pain or shortness of breath prior to or after fall.  Patient reported baseline shortness of breath with activity, also reported that he does not ambulate much due to pain on his knees.  Denied chest pain.  Denied abdomen pain, nausea, vomiting.  Patient reported using 2.5 L nasal cannula at home.  Patient denied recent febrile illness.  Denied abdomen pain, nausea, vomiting.  Patient complaining of increased urinary frequency.    I tried to reach patient's wife but got disconnected.      Past Medical History    Past Medical History:   Diagnosis Date    AAA (abdominal aortic aneurysm) (H)     s/p stenting    AAA (abdominal aortic aneurysm) (H) 11/15/2012    AAA (abdominal aortic aneurysm) CT 11-12  6.4 cm. Saw Dr Muller 1-13;  S/P endovascular repair 3-13;  s/p stenting      Alcohol dependence in remission (H)     Alcohol use disorder, severe, in sustained remission, dependence (H) 08/16/2021    Anxiety     Atrial fibrillation with normal ventricular rate (H)     Benign prostatic hyperplasia with lower urinary tract symptoms     Bronchiectasis without complication (H)     2/27/2020    COPD (chronic obstructive pulmonary disease) (H)     CVA (cerebral vascular accident) (H) 2019    DDD (degenerative disc disease), lumbar     Depression     Depressive disorder     Diabetes (H)     Diastolic dysfunction 01/22/2021    DJD (degenerative joint disease) of knee     left    Essential hypertension     Glaucoma     Glaucoma      History of stroke without residual deficits     Hyperlipidemia     Hypertension     Hypothyroidism     Hypothyroidism     Kidney stones     Major depression     Memory problem     Nocturia     Obesity     Opioid use disorder, severe, dependence (H) 08/16/2021    Overactive bladder 02/25/2021    Primary osteoarthritis of left knee     Psoriasis     Psoriasis     Seborrheic keratoses     Somnolence, daytime     Stenosis of right carotid artery     history of TIA's       Past Surgical History   Past Surgical History:   Procedure Laterality Date    ABDOMEN SURGERY      ABDOMINAL AORTIC ANEURYSM REPAIR  2013    stent    CAROTID ENDARTERECTOMY Right 9/9/2019    Procedure: RIGHT CAROTID ENDARTERECTOMY;  Surgeon: Miguel Muller MD;  Location: [facility];  Service: General    CIRCUMCISION      CYSTOSCOPY      CYSTOSCOPY PROSTATE W/ LASER N/A 6/12/2018    Procedure:  TRANSURETHRAL RESECTION OF THE PROSTATE WITH QUANTA LASER;  Surgeon: Eze Levi MD;  Location: [facility];  Service:     CYSTOSCOPY PROSTATE W/ LASER N/A 2/18/2020    Procedure: CYSTOSCOPY WITH TRANSURETHRAL INCISION OF THE PROSTATE WITH QUANTA LASER;  Surgeon: Eze Levi MD;  Location: [facility];  Service: Urology    CYSTOSCOPY W/ LITHOLAPAXY / [facility] N/A 6/12/2018    Procedure: CYSTOSCOPY AND LITHOLAPAXY;  Surgeon: Eze Levi MD;  Location: [facility];  Service:     IR ABDOMINAL AORTAGRAM  5/19/2020    IR ABDOMINAL AORTIC ANEURYSM ENDOGRAFT  5/19/2020    IR ABDOMINAL AORTOGRAM  5/19/2020    IR ABDOMINAL ENDOVASCULAR STENT GRAFT  5/19/2020    LITHOTRIPSY      PERCUTANEOUS NEPHROSTOLITHOTOMY Right 03/10/2020    ZZC HUANG W/O FACETEC FORAMOT/DSKC 1/2 VRT SEG, LUMBAR Bilateral 3/3/2021    Procedure: Bilateral lumbar 2 - Lumbar 4 decompressive lumbar laminectomy with medial facetectomies;  Surgeon: Renée King MD;  Location: [facility];  Service: Spine       Prior to Admission Medications    Prior to Admission Medications   Prescriptions Last Dose Informant Patient Reported? Taking?   Cholecalciferol (VITAMIN D3) 50 MCG (2000 UT) CAPS 10/5/2024 at 0800  Yes Yes   Sig: Take 1 tablet by mouth every morning.   DULoxetine (CYMBALTA) 60 MG capsule 10/5/2024 at 0800  Yes Yes   Sig: Take 60 mg by mouth every morning.   Melatonin 10 MG TABS tablet 10/4/2024 at 2000  Yes Yes   Sig: Take 10 mg by mouth at bedtime.   OLANZapine (ZYPREXA) 7.5 MG tablet 10/4/2024 at 2000  Yes Yes   Sig: Take 7.5 mg by mouth at bedtime.   acetaminophen (TYLENOL) 500 MG tablet 10/5/2024 at 0800  Yes Yes   Sig: Take 1,000 mg by mouth 2 times daily.   aspirin (ASA) 81 MG chewable tablet 10/5/2024 at 0800  Yes Yes   Sig: Take 81 mg by mouth every morning.   busPIRone (BUSPAR) 5 MG tablet 10/5/2024 at 0800  Yes Yes   Sig: Take 5 mg by mouth 2 times daily   furosemide (LASIX) 20 MG tablet 10/5/2024 at 0800  Yes Yes   Sig: Take 20 mg by mouth every morning.   ipratropium - albuterol 0.5 mg/2.5 mg/3 mL (DUONEB) 0.5-2.5 (3) MG/3ML neb solution Unknown at PRN  Yes Yes   Sig: Take 1 vial by nebulization every 6 hours as needed for wheezing.   latanoprost (XALATAN) 0.005 % ophthalmic solution 10/4/2024 at 2000  Yes Yes   Sig: Place 1 drop into both eyes At Bedtime   levothyroxine (SYNTHROID/LEVOTHROID) 88 MCG tablet 10/5/2024 at 0700  Yes Yes   Sig: Take 88 mcg by mouth daily before breakfast   lidocaine (LIDODERM) 5 % patch 10/5/2024 at 1229  No Yes   Sig: Place 1 patch over 12 hours onto the skin every 24 hours. To prevent lidocaine toxicity, patient should be patch free for 12 hrs daily.   losartan (COZAAR) 25 MG tablet 10/5/2024 at 0800  Yes Yes   Sig: Take 25 mg by mouth every morning.   menthol-zinc oxide (CALMOSEPTINE) 0.44-20.6 % OINT ointment 10/5/2024 at AM (1st administration of day))  Yes Yes   Sig: Apply topically 3 times daily. Apply to saccral, coccyx, and groin area(s) topically three times daily for prevention.   metoprolol  succinate ER (TOPROL-XL) 12.5 mg TABS 24 hr half-tab 10/5/2024 at 0800  Yes Yes   Sig: Take 12.5 mg by mouth every morning.   multivitamin w/minerals (THERA-VIT-M) tablet 10/5/2024 at 0800  Yes Yes   Sig: Take 1 tablet by mouth every morning.   naproxen sodium (ANAPROX) 220 MG tablet 10/5/2024 at 1600 (2nd dose)  Yes Yes   Sig: Take 220 mg by mouth 2 times daily (with meals).   pantoprazole (PROTONIX) 40 MG EC tablet 10/5/2024 at 0700  No Yes   Sig: Take 1 tablet (40 mg) by mouth every morning (before breakfast)   rosuvastatin (CRESTOR) 10 MG tablet 10/4/2024 at 2000  Yes Yes   Sig: Take 10 mg by mouth at bedtime.   senna-docusate (SENOKOT-S/PERICOLACE) 8.6-50 MG tablet Unknown at PRN  No Yes   Sig: Take 1 tablet by mouth 2 times daily as needed for constipation.      Facility-Administered Medications: None           Physical Exam   Vital Signs: Temp: 99.3  F (37.4  C) Temp src: Oral BP: 128/75 Pulse: 93   Resp: 16 SpO2: 95 % O2 Device: Nasal cannula Oxygen Delivery: 3 LPM  Weight: 178 lbs 4.8 oz        Medical Decision Making             Data      None None/MRSA

## 2024-10-09 ENCOUNTER — APPOINTMENT (OUTPATIENT)
Dept: PULMONOLOGY | Facility: CLINIC | Age: 74
End: 2024-10-09

## 2024-10-09 ENCOUNTER — INPATIENT (INPATIENT)
Facility: HOSPITAL | Age: 74
LOS: 5 days | Discharge: HOME HEALTH | DRG: 195 | End: 2024-10-15
Attending: STUDENT IN AN ORGANIZED HEALTH CARE EDUCATION/TRAINING PROGRAM | Admitting: STUDENT IN AN ORGANIZED HEALTH CARE EDUCATION/TRAINING PROGRAM
Payer: MEDICARE

## 2024-10-09 VITALS
WEIGHT: 149.91 LBS | RESPIRATION RATE: 22 BRPM | OXYGEN SATURATION: 99 % | DIASTOLIC BLOOD PRESSURE: 68 MMHG | HEART RATE: 67 BPM | TEMPERATURE: 100 F | SYSTOLIC BLOOD PRESSURE: 100 MMHG | HEIGHT: 64 IN

## 2024-10-09 DIAGNOSIS — D64.9 ANEMIA, UNSPECIFIED: ICD-10-CM

## 2024-10-09 DIAGNOSIS — Z29.9 ENCOUNTER FOR PROPHYLACTIC MEASURES, UNSPECIFIED: ICD-10-CM

## 2024-10-09 DIAGNOSIS — J45.909 UNSPECIFIED ASTHMA, UNCOMPLICATED: ICD-10-CM

## 2024-10-09 DIAGNOSIS — Z98.890 OTHER SPECIFIED POSTPROCEDURAL STATES: Chronic | ICD-10-CM

## 2024-10-09 DIAGNOSIS — Z98.89 OTHER SPECIFIED POSTPROCEDURAL STATES: Chronic | ICD-10-CM

## 2024-10-09 DIAGNOSIS — Z86.79 PERSONAL HISTORY OF OTHER DISEASES OF THE CIRCULATORY SYSTEM: ICD-10-CM

## 2024-10-09 DIAGNOSIS — R56.9 UNSPECIFIED CONVULSIONS: ICD-10-CM

## 2024-10-09 DIAGNOSIS — Z96.653 PRESENCE OF ARTIFICIAL KNEE JOINT, BILATERAL: Chronic | ICD-10-CM

## 2024-10-09 DIAGNOSIS — H05.352 EXOSTOSIS OF LEFT ORBIT: Chronic | ICD-10-CM

## 2024-10-09 DIAGNOSIS — K62.5 HEMORRHAGE OF ANUS AND RECTUM: Chronic | ICD-10-CM

## 2024-10-09 DIAGNOSIS — E11.9 TYPE 2 DIABETES MELLITUS WITHOUT COMPLICATIONS: ICD-10-CM

## 2024-10-09 DIAGNOSIS — Z87.09 PERSONAL HISTORY OF OTHER DISEASES OF THE RESPIRATORY SYSTEM: Chronic | ICD-10-CM

## 2024-10-09 DIAGNOSIS — J18.9 PNEUMONIA, UNSPECIFIED ORGANISM: ICD-10-CM

## 2024-10-09 DIAGNOSIS — I48.91 UNSPECIFIED ATRIAL FIBRILLATION: ICD-10-CM

## 2024-10-09 DIAGNOSIS — Z95.2 PRESENCE OF PROSTHETIC HEART VALVE: Chronic | ICD-10-CM

## 2024-10-09 DIAGNOSIS — E78.5 HYPERLIPIDEMIA, UNSPECIFIED: ICD-10-CM

## 2024-10-09 LAB
ADD ON TEST-SPECIMEN IN LAB: SIGNIFICANT CHANGE UP
ADD ON TEST-SPECIMEN IN LAB: SIGNIFICANT CHANGE UP
ALBUMIN SERPL ELPH-MCNC: 3.4 G/DL — SIGNIFICANT CHANGE UP (ref 3.3–5)
ALP SERPL-CCNC: 61 U/L — SIGNIFICANT CHANGE UP (ref 40–120)
ALT FLD-CCNC: 11 U/L — SIGNIFICANT CHANGE UP (ref 10–45)
ANION GAP SERPL CALC-SCNC: 13 MMOL/L — SIGNIFICANT CHANGE UP (ref 5–17)
ANISOCYTOSIS BLD QL: SLIGHT — SIGNIFICANT CHANGE UP
APTT BLD: 37.3 SEC — HIGH (ref 24.5–35.6)
AST SERPL-CCNC: 12 U/L — SIGNIFICANT CHANGE UP (ref 10–40)
BASOPHILS # BLD AUTO: 0 K/UL — SIGNIFICANT CHANGE UP (ref 0–0.2)
BASOPHILS NFR BLD AUTO: 0 % — SIGNIFICANT CHANGE UP (ref 0–2)
BILIRUB SERPL-MCNC: 0.4 MG/DL — SIGNIFICANT CHANGE UP (ref 0.2–1.2)
BUN SERPL-MCNC: 15 MG/DL — SIGNIFICANT CHANGE UP (ref 7–23)
CALCIUM SERPL-MCNC: 8.8 MG/DL — SIGNIFICANT CHANGE UP (ref 8.4–10.5)
CHLORIDE SERPL-SCNC: 101 MMOL/L — SIGNIFICANT CHANGE UP (ref 96–108)
CO2 SERPL-SCNC: 22 MMOL/L — SIGNIFICANT CHANGE UP (ref 22–31)
CREAT SERPL-MCNC: 0.72 MG/DL — SIGNIFICANT CHANGE UP (ref 0.5–1.3)
EGFR: 87 ML/MIN/1.73M2 — SIGNIFICANT CHANGE UP
ELLIPTOCYTES BLD QL SMEAR: SLIGHT — SIGNIFICANT CHANGE UP
EOSINOPHIL # BLD AUTO: 0 K/UL — SIGNIFICANT CHANGE UP (ref 0–0.5)
EOSINOPHIL NFR BLD AUTO: 0 % — SIGNIFICANT CHANGE UP (ref 0–6)
FLUAV AG NPH QL: SIGNIFICANT CHANGE UP
FLUBV AG NPH QL: SIGNIFICANT CHANGE UP
GAS PNL BLDV: SIGNIFICANT CHANGE UP
GLUCOSE BLDC GLUCOMTR-MCNC: 293 MG/DL — HIGH (ref 70–99)
GLUCOSE SERPL-MCNC: 255 MG/DL — HIGH (ref 70–99)
HCT VFR BLD CALC: 33.8 % — LOW (ref 34.5–45)
HGB BLD-MCNC: 10.1 G/DL — LOW (ref 11.5–15.5)
HYPOCHROMIA BLD QL: SLIGHT — SIGNIFICANT CHANGE UP
INR BLD: 1.86 RATIO — HIGH (ref 0.85–1.16)
LYMPHOCYTES # BLD AUTO: 0.64 K/UL — LOW (ref 1–3.3)
LYMPHOCYTES # BLD AUTO: 4.3 % — LOW (ref 13–44)
MANUAL SMEAR VERIFICATION: SIGNIFICANT CHANGE UP
MCHC RBC-ENTMCNC: 22.1 PG — LOW (ref 27–34)
MCHC RBC-ENTMCNC: 29.9 GM/DL — LOW (ref 32–36)
MCV RBC AUTO: 73.8 FL — LOW (ref 80–100)
MICROCYTES BLD QL: SLIGHT — SIGNIFICANT CHANGE UP
MONOCYTES # BLD AUTO: 0.77 K/UL — SIGNIFICANT CHANGE UP (ref 0–0.9)
MONOCYTES NFR BLD AUTO: 5.2 % — SIGNIFICANT CHANGE UP (ref 2–14)
NEUTROPHILS # BLD AUTO: 13.48 K/UL — HIGH (ref 1.8–7.4)
NEUTROPHILS NFR BLD AUTO: 84.4 % — HIGH (ref 43–77)
NEUTS BAND # BLD: 6.1 % — SIGNIFICANT CHANGE UP (ref 0–8)
NT-PROBNP SERPL-SCNC: 1226 PG/ML — HIGH (ref 0–300)
PLAT MORPH BLD: NORMAL — SIGNIFICANT CHANGE UP
PLATELET # BLD AUTO: 238 K/UL — SIGNIFICANT CHANGE UP (ref 150–400)
POIKILOCYTOSIS BLD QL AUTO: SIGNIFICANT CHANGE UP
POLYCHROMASIA BLD QL SMEAR: SLIGHT — SIGNIFICANT CHANGE UP
POTASSIUM SERPL-MCNC: 4 MMOL/L — SIGNIFICANT CHANGE UP (ref 3.5–5.3)
POTASSIUM SERPL-SCNC: 4 MMOL/L — SIGNIFICANT CHANGE UP (ref 3.5–5.3)
PROCALCITONIN SERPL-MCNC: 0.11 NG/ML — HIGH (ref 0.02–0.1)
PROT SERPL-MCNC: 6.5 G/DL — SIGNIFICANT CHANGE UP (ref 6–8.3)
PROTHROM AB SERPL-ACNC: 21.3 SEC — HIGH (ref 9.9–13.4)
RBC # BLD: 4.58 M/UL — SIGNIFICANT CHANGE UP (ref 3.8–5.2)
RBC # FLD: 18.8 % — HIGH (ref 10.3–14.5)
RBC BLD AUTO: ABNORMAL
RSV RNA NPH QL NAA+NON-PROBE: SIGNIFICANT CHANGE UP
SARS-COV-2 RNA SPEC QL NAA+PROBE: SIGNIFICANT CHANGE UP
SODIUM SERPL-SCNC: 136 MMOL/L — SIGNIFICANT CHANGE UP (ref 135–145)
TARGETS BLD QL SMEAR: SLIGHT — SIGNIFICANT CHANGE UP
TROPONIN T, HIGH SENSITIVITY RESULT: 41 NG/L — SIGNIFICANT CHANGE UP (ref 0–51)
TROPONIN T, HIGH SENSITIVITY RESULT: 50 NG/L — SIGNIFICANT CHANGE UP (ref 0–51)
WBC # BLD: 14.89 K/UL — HIGH (ref 3.8–10.5)
WBC # FLD AUTO: 14.89 K/UL — HIGH (ref 3.8–10.5)

## 2024-10-09 PROCEDURE — 71045 X-RAY EXAM CHEST 1 VIEW: CPT | Mod: 26

## 2024-10-09 PROCEDURE — 99285 EMERGENCY DEPT VISIT HI MDM: CPT | Mod: FS

## 2024-10-09 PROCEDURE — 99223 1ST HOSP IP/OBS HIGH 75: CPT

## 2024-10-09 PROCEDURE — 71250 CT THORAX DX C-: CPT | Mod: 26

## 2024-10-09 RX ORDER — LEVETIRACETAM 1000 MG
1000 TABLET ORAL
Refills: 0 | Status: DISCONTINUED | OUTPATIENT
Start: 2024-10-09 | End: 2024-10-11

## 2024-10-09 RX ORDER — GABAPENTIN 800 MG/1
100 TABLET, FILM COATED ORAL EVERY 8 HOURS
Refills: 0 | Status: DISCONTINUED | OUTPATIENT
Start: 2024-10-09 | End: 2024-10-15

## 2024-10-09 RX ORDER — SODIUM CHLORIDE IRRIG SOLUTION 0.9 %
500 SOLUTION, IRRIGATION IRRIGATION ONCE
Refills: 0 | Status: COMPLETED | OUTPATIENT
Start: 2024-10-09 | End: 2024-10-09

## 2024-10-09 RX ORDER — ALCOHOL ANTISEPTIC PADS
15 PADS, MEDICATED (EA) TOPICAL ONCE
Refills: 0 | Status: DISCONTINUED | OUTPATIENT
Start: 2024-10-09 | End: 2024-10-15

## 2024-10-09 RX ORDER — ALBUTEROL 90 MCG
2 AEROSOL (GRAM) INHALATION EVERY 6 HOURS
Refills: 0 | Status: DISCONTINUED | OUTPATIENT
Start: 2024-10-09 | End: 2024-10-15

## 2024-10-09 RX ORDER — INSULIN LISPRO 100/ML
VIAL (ML) SUBCUTANEOUS
Refills: 0 | Status: DISCONTINUED | OUTPATIENT
Start: 2024-10-09 | End: 2024-10-12

## 2024-10-09 RX ORDER — SENNOSIDES 8.6 MG
2 TABLET ORAL AT BEDTIME
Refills: 0 | Status: DISCONTINUED | OUTPATIENT
Start: 2024-10-09 | End: 2024-10-15

## 2024-10-09 RX ORDER — INSULIN LISPRO 100/ML
VIAL (ML) SUBCUTANEOUS AT BEDTIME
Refills: 0 | Status: DISCONTINUED | OUTPATIENT
Start: 2024-10-09 | End: 2024-10-12

## 2024-10-09 RX ORDER — TIOTROPIUM BROMIDE INHALATION SPRAY 3.12 UG/1
2 SPRAY, METERED RESPIRATORY (INHALATION) DAILY
Refills: 0 | Status: DISCONTINUED | OUTPATIENT
Start: 2024-10-09 | End: 2024-10-15

## 2024-10-09 RX ORDER — SODIUM CHLORIDE IRRIG SOLUTION 0.9 %
1000 SOLUTION, IRRIGATION IRRIGATION
Refills: 0 | Status: DISCONTINUED | OUTPATIENT
Start: 2024-10-09 | End: 2024-10-15

## 2024-10-09 RX ORDER — PANTOPRAZOLE SODIUM 40 MG/1
40 TABLET, DELAYED RELEASE ORAL
Refills: 0 | Status: DISCONTINUED | OUTPATIENT
Start: 2024-10-09 | End: 2024-10-15

## 2024-10-09 RX ORDER — METHYLPREDNISOLONE ACETATE 80 MG/ML
8 INJECTION, SUSPENSION INTRA-ARTICULAR; INTRALESIONAL; INTRAMUSCULAR; SOFT TISSUE DAILY
Refills: 0 | Status: DISCONTINUED | OUTPATIENT
Start: 2024-10-09 | End: 2024-10-10

## 2024-10-09 RX ORDER — APIXABAN 5 MG/1
5 TABLET, FILM COATED ORAL EVERY 12 HOURS
Refills: 0 | Status: DISCONTINUED | OUTPATIENT
Start: 2024-10-09 | End: 2024-10-15

## 2024-10-09 RX ORDER — ALCOHOL ANTISEPTIC PADS
25 PADS, MEDICATED (EA) TOPICAL ONCE
Refills: 0 | Status: DISCONTINUED | OUTPATIENT
Start: 2024-10-09 | End: 2024-10-15

## 2024-10-09 RX ORDER — VALACYCLOVIR 1000 MG/1
1000 TABLET ORAL
Refills: 0 | Status: DISCONTINUED | OUTPATIENT
Start: 2024-10-09 | End: 2024-10-15

## 2024-10-09 RX ORDER — MONTELUKAST SODIUM 10 MG/1
10 TABLET, FILM COATED ORAL DAILY
Refills: 0 | Status: DISCONTINUED | OUTPATIENT
Start: 2024-10-09 | End: 2024-10-15

## 2024-10-09 RX ORDER — MULTI VITAMIN/MINERAL SUPPLEMENT WITH ASCORBIC ACID, NIACIN, PYRIDOXINE, PANTOTHENIC ACID, FOLIC ACID, RIBOFLAVIN, THIAMIN, BIOTIN, COBALAMIN AND ZINC. 60; 20; 12.5; 10; 10; 1.7; 1.5; 1; .3; .006 MG/1; MG/1; MG/1; MG/1; MG/1; MG/1; MG/1; MG/1; MG/1; MG/1
1 TABLET, COATED ORAL DAILY
Refills: 0 | Status: DISCONTINUED | OUTPATIENT
Start: 2024-10-09 | End: 2024-10-15

## 2024-10-09 RX ORDER — 5-HYDROXYTRYPTOPHAN (5-HTP) 100 MG
3 TABLET,DISINTEGRATING ORAL AT BEDTIME
Refills: 0 | Status: DISCONTINUED | OUTPATIENT
Start: 2024-10-09 | End: 2024-10-15

## 2024-10-09 RX ORDER — BUDESONIDE, MICRONIZED 100 %
0.5 POWDER (GRAM) MISCELLANEOUS
Refills: 0 | Status: DISCONTINUED | OUTPATIENT
Start: 2024-10-09 | End: 2024-10-10

## 2024-10-09 RX ORDER — FLUTICASONE PROPION/SALMETEROL 100-50 MCG
1 BLISTER, WITH INHALATION DEVICE INHALATION
Refills: 0 | Status: DISCONTINUED | OUTPATIENT
Start: 2024-10-09 | End: 2024-10-10

## 2024-10-09 RX ORDER — INSULIN GLARGINE 300 U/ML
24 INJECTION, SOLUTION SUBCUTANEOUS EVERY MORNING
Refills: 0 | Status: DISCONTINUED | OUTPATIENT
Start: 2024-10-09 | End: 2024-10-12

## 2024-10-09 RX ORDER — AZTREONAM 75 MG/ML
1000 KIT INHALATION EVERY 8 HOURS
Refills: 0 | Status: DISCONTINUED | OUTPATIENT
Start: 2024-10-09 | End: 2024-10-09

## 2024-10-09 RX ORDER — GLUCAGON INJECTION, SOLUTION 0.5 MG/.1ML
1 INJECTION, SOLUTION SUBCUTANEOUS ONCE
Refills: 0 | Status: DISCONTINUED | OUTPATIENT
Start: 2024-10-09 | End: 2024-10-15

## 2024-10-09 RX ORDER — ALCOHOL ANTISEPTIC PADS
12.5 PADS, MEDICATED (EA) TOPICAL ONCE
Refills: 0 | Status: DISCONTINUED | OUTPATIENT
Start: 2024-10-09 | End: 2024-10-15

## 2024-10-09 RX ORDER — ATORVASTATIN CALCIUM 10 MG/1
20 TABLET, FILM COATED ORAL AT BEDTIME
Refills: 0 | Status: DISCONTINUED | OUTPATIENT
Start: 2024-10-09 | End: 2024-10-15

## 2024-10-09 RX ORDER — MEXILETINE HCL 150 MG
200 CAPSULE ORAL EVERY 8 HOURS
Refills: 0 | Status: DISCONTINUED | OUTPATIENT
Start: 2024-10-09 | End: 2024-10-15

## 2024-10-09 RX ORDER — INSULIN GLARGINE 300 U/ML
6 INJECTION, SOLUTION SUBCUTANEOUS AT BEDTIME
Refills: 0 | Status: DISCONTINUED | OUTPATIENT
Start: 2024-10-09 | End: 2024-10-12

## 2024-10-09 RX ADMIN — Medication 1: at 22:07

## 2024-10-09 RX ADMIN — Medication 2 PUFF(S): at 23:43

## 2024-10-09 RX ADMIN — APIXABAN 5 MILLIGRAM(S): 5 TABLET, FILM COATED ORAL at 18:23

## 2024-10-09 RX ADMIN — Medication 500 MILLILITER(S): at 10:08

## 2024-10-09 RX ADMIN — Medication 200 MILLIGRAM(S): at 21:40

## 2024-10-09 RX ADMIN — ATORVASTATIN CALCIUM 20 MILLIGRAM(S): 10 TABLET, FILM COATED ORAL at 21:41

## 2024-10-09 RX ADMIN — INSULIN GLARGINE 6 UNIT(S): 300 INJECTION, SOLUTION SUBCUTANEOUS at 22:06

## 2024-10-09 RX ADMIN — Medication 1000 MILLIGRAM(S): at 18:22

## 2024-10-09 RX ADMIN — GABAPENTIN 100 MILLIGRAM(S): 800 TABLET, FILM COATED ORAL at 21:41

## 2024-10-09 RX ADMIN — Medication 2 TABLET(S): at 21:40

## 2024-10-09 RX ADMIN — Medication 2 PUFF(S): at 18:23

## 2024-10-09 RX ADMIN — Medication 1 DOSE(S): at 23:34

## 2024-10-09 RX ADMIN — AZTREONAM 50 MILLIGRAM(S): KIT at 10:49

## 2024-10-09 NOTE — ED PROVIDER NOTE - OBJECTIVE STATEMENT
74-year-old female with past medical history for type a aortic dissection status postrepair, status post TAVR, asthma on chronic steroids, bronchiectasis, recurrent pneumonia, tracheomalacia status post tracheoplasty, diabetes, A-fib on Eliquis, colorectal cancer status post colostomy presenting to the ER for evaluation of lethargy and some decreased responsiveness a few days ago that seemingly improved but now has some SOB and cough. went to pulm today who sent her to ED indicating she looked unwell. patient on chronic pred, 8 mg. no measured fevers. family notes she was admitted at OSH last week for SOB last week although unclear what was found  pmd justin robertson

## 2024-10-09 NOTE — ED PROVIDER NOTE - INTERPRETATION
abnormal Imiquimod Counseling:  I discussed with the patient the risks of imiquimod including but not limited to erythema, scaling, itching, weeping, crusting, and pain.  Patient understands that the inflammatory response to imiquimod is variable from person to person and was educated regarded proper titration schedule.  If flu-like symptoms develop, patient knows to discontinue the medication and contact us.

## 2024-10-09 NOTE — ED ADULT NURSE NOTE - NSICDXPASTSURGICALHX_GEN_ALL_CORE_FT
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    H/O pelvic surgery 5 years ago - s/p fracture    H/O total knee replacement, bilateral 5 years ago    History of partial hysterectomy 30 years ago - fibroids    History of sinus surgery multiple sinus surgeries    History of tracheomalacia 2015 - attempted tracheal stenting ([facility])- course complicated by obstruction, respiratory failure, multiple CPR attempts -  stent discontinued; 10/20/2016 Tracheobronchoplasty (Prolene Mesh) performed at [facility] by Dr Zapien    Rectal bleeding exam under anesthesia ([facility]) 2/2018    S/P aortic valve replacement     S/P bronchoscopy 6/5/2018 - [place] (Dr Zapien) no evidence of tracheobronchomalacia in trachea or bronchial tubes    S/P TAVR (transcatheter aortic valve replacement)

## 2024-10-09 NOTE — ED CLERICAL - NS ED CLERK UNITS
1. Have you been to the ER, urgent care clinic since your last visit? Hospitalized since your last visit? No 
 
2. Have you seen or consulted any other health care providers outside of the [address] [place] [address] since your last visit? Include any pap smears or colon screening. Dr Neda Rushing APER

## 2024-10-09 NOTE — ED ADULT NURSE NOTE - OBJECTIVE STATEMENT
74y F PMH COPD, afib on eliquis, aortic dissection s/p repair, TAVR, asthma on chronic steroids, DM. colorectal CA s/p colostomy, seziure disorder bibems from pulm MD office c/o sob. Pt reports pain on inspiration x3 days. Pt c/o cough and sob. Pt daughter reports pt had episode of decreased responsiveness that has now improved. Pt recently discharged from [facility] for similar complaints. Pt presents with R arm/elbow infiltration from previous hospital. MD Mera, PA Allyson aware. Pt denies chest pain, nvd, headache, fevers, chills at this time. Pt not actively coughing at this time.  Cardiac monitor placed. Comfort and safety maintained.

## 2024-10-09 NOTE — H&P ADULT - PROBLEM SELECTOR PLAN 1
Presents with cough productive of yellow sputum, sore throat, and shortness of breath. Afebrile (Tmax 100.1 F) in the ED. Leukocytosis to 14.89. CT Chest revealed new ill-defined peribronchovascular groundglass nodular opacities   throughout both lungs, likely infectious. Sepsis given patient met SIRS criteria in the setting of pneumonia   -C/w Aztreonam q8h IV  -C/w home cipro 500 mg oral tablet twice daily    -Blood cultures x2 obtained   -Sputum culture, legionella, strep ordered   -MRSA swab ordered   -Will trend WBC count daily   -Low threshold to start vancomycin for MRSA coverage given recent hospitalization if decompensates  -procalcitonin  -ID consult given patient on chronic ciprofloxacin at home, has a history of multiple allergies and multiple recurrent pneumonias

## 2024-10-09 NOTE — PATIENT PROFILE ADULT - DO YOU FEEL LIKE HURTING YOURSELF OR OTHERS?
Francine Dillon 53 y.o. female presents today with   Chief Complaint   Patient presents with    Medication Refill    Health Maintenance     bw PENDING. mAMMOGRAMS DONE WITH gyn       Mental Health Problem  The primary symptoms include dysphoric mood and somatic symptoms. The primary symptoms do not include hallucinations. The current episode started more than 1 month ago. This is a recurrent problem. The onset of the illness is precipitated by emotional stress and a stressful event. The degree of incapacity that she is experiencing as a consequence of her illness is moderate. Additional symptoms of the illness include anhedonia, insomnia and agitation. She does not admit to suicidal ideas. She does not contemplate harming herself. Risk factors that are present for mental illness include a history of mental illness. Past Medical History:   Diagnosis Date    Anxiety     Benign mole     see dermatology regular    Endometriosis     OCD (obsessive compulsive disorder)     Polycystic disease, ovaries     Sarcoidosis     lung, forehead     Patient Active Problem List    Diagnosis Date Noted    Sarcoidosis     Acute bacterial sinusitis 09/15/2019    PCOS (polycystic ovarian syndrome) 11/29/2012     Past Surgical History:   Procedure Laterality Date    ENDOMETRIAL ABLATION      PARTIAL HYSTERECTOMY      2015     No family history on file.   Social History     Socioeconomic History    Marital status:      Spouse name: None    Number of children: None    Years of education: None    Highest education level: None   Occupational History    None   Social Needs    Financial resource strain: Not hard at all   [place] insecurity     Worry: Never true     Inability: Never true   [employer] needs     Medical: No     Non-medical: No   Tobacco Use    Smoking status: Former Smoker    Smokeless tobacco: Never Used   Substance and Sexual Activity    Alcohol use: Yes     Comment: social    Drug use: None    Sexual activity: None   Lifestyle    Physical activity     Days per week: None     Minutes per session: None    Stress: None   Relationships    Social connections     Talks on phone: None     Gets together: None     Attends Jew service: None     Active member of club or organization: None     Attends meetings of clubs or organizations: None     Relationship status: None    Intimate partner violence     Fear of current or ex partner: None     Emotionally abused: None     Physically abused: None     Forced sexual activity: None   Other Topics Concern    None   Social History Narrative    None     No Known Allergies    Review of Systems   Constitutional: Negative for chills and fever. HENT: Negative for congestion. Eyes: Negative for itching. Respiratory: Negative for apnea and cough. Cardiovascular: Negative for chest pain and palpitations. Gastrointestinal: Negative. Negative for abdominal distention and blood in stool. Genitourinary: Negative for enuresis, hematuria and vaginal bleeding. Musculoskeletal: Negative for gait problem and joint swelling. Skin: Negative for rash. Neurological: Negative for syncope and speech difficulty. Hematological: Does not bruise/bleed easily. Psychiatric/Behavioral: Positive for agitation and dysphoric mood. Negative for hallucinations and suicidal ideas. The patient is nervous/anxious and has insomnia. Vitals:    04/30/21 1329   BP: 118/78   Site: Left Upper Arm   Cuff Size: Large Adult   Pulse: 90   Temp: 98.1 °F (36.7 °C)   SpO2: 99%   Weight: 170 lb (77.1 kg)   Height: 5' 3\" (1.6 m)       Physical Exam  Constitutional:       Appearance: She is well-developed. HENT:      Head: Normocephalic and atraumatic. Eyes:      Pupils: Pupils are equal, round, and reactive to light. Neck:      Musculoskeletal: Normal range of motion and neck supple. Cardiovascular:      Rate and Rhythm: Normal rate and regular rhythm. Heart sounds: Normal heart sounds. Pulmonary:      Breath sounds: Normal breath sounds. Abdominal:      General: Bowel sounds are normal.      Palpations: Abdomen is soft. Musculoskeletal: Normal range of motion. Neurological:      Mental Status: She is alert and oriented to person, place, and time. Assessment/Plan  Francine was seen today for medication refill and health maintenance. Diagnoses and all orders for this visit:    Anxiety  -     sertraline (ZOLOFT) 50 MG tablet; Take 1 tablet by mouth daily    Hyperglycemia  -     Hemoglobin A1C; Future    Hypothyroidism, unspecified type  -     TSH with Reflex; Future    Preventative health care  -     CBC With Auto Differential; Future  -     Comprehensive Metabolic Panel; Future  -     Lipid, Fasting; Future    Hyperlipidemia, unspecified hyperlipidemia type  -     Lipid, Fasting; Future        No follow-ups on file.     Carrie Seals MD no

## 2024-10-09 NOTE — H&P ADULT - PROBLEM SELECTOR PLAN 4
Hemoglobin 10.1 on admission. Hemoglobin 11.6 a couple of months ago  No active signs of bleeding   -Obtain iron studies  -Trend with CBC daily

## 2024-10-09 NOTE — H&P ADULT - NSICDXPASTSURGICALHX_GEN_ALL_CORE_FT
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    H/O pelvic surgery 5 years ago - s/p fracture    H/O total knee replacement, bilateral 5 years ago    History of partial hysterectomy 30 years ago - fibroids    History of sinus surgery multiple sinus surgeries    History of tracheomalacia 2015 - attempted tracheal stenting ([facility])- course complicated by obstruction, respiratory failure, multiple CPR attempts -  stent discontinued; 10/20/2016 Tracheobronchoplasty (Prolene Mesh) performed at [facility] by Dr Zapien    Rectal bleeding exam under anesthesia ([facility]) 2/2018    S/P aortic valve replacement     S/P bronchoscopy 6/5/2018 - [place] (Dr Zapien) no evidence of tracheobronchomalacia in trachea or bronchial tubes    S/P TAVR (transcatheter aortic valve replacement)

## 2024-10-09 NOTE — ED PROVIDER NOTE - NSICDXPASTMEDICALHX_GEN_ALL_CORE_FT
PAST MEDICAL HISTORY:  Atrial fibrillation     COPD (chronic obstructive pulmonary disease)     DVT (deep venous thrombosis) 15-20 years ago, took coumadin    Seizure x 1 1/7/18    TIA (transient ischemic attack) multiple, last 5 years ago - presents as right-sided weakness    
Vu

## 2024-10-09 NOTE — H&P ADULT - ASSESSMENT
Shirley Bloom is a 74 year old female with a past medical history of  type a aortic dissection status postrepair, status post TAVR, asthma on chronic steroids, bronchiectasis, recurrent pneumonia, tracheomalacia status post tracheoplasty, diabetes, A-fib on Eliquis, colorectal cancer status post colostomy who presents with shortness of breath, sore throat, and cough productive of yellow sputum for the past 2 days. CT Chest remarkable for new ill-defined peribronchovascular groundglass nodular opacities throughout both lungs, likely infectious.

## 2024-10-09 NOTE — H&P ADULT - PROBLEM SELECTOR PLAN 3
History of aortic dissection s/p repair and TAVR in 2023   History of HTN- on hydralazine, labetalol, nifedipine at home   On clopidogrel at home     -Hold HTN meds for now   -Continue clopidogrel

## 2024-10-09 NOTE — H&P ADULT - ATTENDING COMMENTS
Date of service: 10/9/24    Pt states she was in [facility] recently with symptoms of chest pressure. She was concerned because it was similar to the symptoms she had previously when she was diagnosed with aortic dissection. At [place], pt reports CT scan was done, everything appeared stable. No procedures were done. She was hospitalized for about a week after which she was discharged home last weekend. A few days later, the patient started developing cough with shortness of breath. Her daughter was concerned, called her pulmonologist, Dr. Allison, who put her in for an urgent clinic visit the following day. When she saw Dr. Allison in office, he referred her to the hospital d/t concern for pneumonia.    On exam the patient was in no acute distress, breathing comfortably on room air. Some bibasilar crackles. No signficant lower extremity edema.    Pt has multiple allergies to antibiotics. She was unable to name each one but states in the past they have caused her trouble breathing, and one of them caused her to 'flat line.'    #GGO, pneumonia  #severe asthma, on chronic steroids    - s/p dose of aztreonam at Dr. Allison's office. Will continue for now  - given multiple drug allergies, will ask ID for further input regarding antibiotic choices  - continue home methylprednisone 8mg daily for now  - pulm consult    Rest of plan as above Date of service: 10/9/24    Pt states she was in [facility] recently with symptoms of chest pressure. She was concerned because it was similar to the symptoms she had previously when she was diagnosed with aortic dissection. At [place], pt reports CT scan was done, everything appeared stable. No procedures were done. She was hospitalized for about a week after which she was discharged home last weekend. A few days later, the patient started developing cough with shortness of breath. Her daughter was concerned, called her pulmonologist, Dr. Allison, who put her in for an urgent clinic visit the following day. When she saw Dr. Allison in office, he referred her to the hospital d/t concern for pneumonia.    On exam the patient was in no acute distress, breathing comfortably on room air. Some bibasilar crackles. No significant lower extremity edema.    Pt has multiple allergies to antibiotics. She was unable to name each one but states in the past they have caused her trouble breathing, and one of them caused her to 'flat line.'    #sepsis secondary to pneumonia  #severe asthma, on chronic steroids    - s/p dose of aztreonam at Dr. Allison's office. Will continue for now  - given multiple drug allergies, will ask ID for further input regarding antibiotic choices  - continue home methylprednisone 8mg daily for now  - pulm consult    Rest of plan as above

## 2024-10-09 NOTE — PATIENT PROFILE ADULT - FALL HARM RISK - HARM RISK INTERVENTIONS
Assistance OOB with selected safe patient handling equipment/Communicate Risk of Fall with Harm to all staff/Discuss with provider need for PT consult/Monitor for mental status changes/Monitor gait and stability/Provide patient with walking aids - walker, cane, crutches/Reinforce activity limits and safety measures with patient and family/Review medications for side effects contributing to fall risk/Sit up slowly, dangle for a short time, stand at bedside before walking/Tailored Fall Risk Interventions/Toileting schedule using arm’s reach rule for commode and bathroom/Use of alarms - bed, chair and/or voice tab/Visual Cue: Yellow wristband and red socks/Bed in lowest position, wheels locked, appropriate side rails in place/Call bell, personal items and telephone in reach/Instruct patient to call for assistance before getting out of bed or chair/Non-slip footwear when patient is out of bed/[place] to call system/Physically safe environment - no spills, clutter or unnecessary equipment/Purposeful Proactive Rounding/Room/bathroom lighting operational, light cord in reach

## 2024-10-09 NOTE — ED ADULT TRIAGE NOTE - CHIEF COMPLAINT QUOTE
diff breathing, worse with inspiration, sent from MD office, [facility] seizure

## 2024-10-09 NOTE — H&P ADULT - PROBLEM SELECTOR PLAN 2
Home regimen: -ohtuvayre inhalation suspension, breo ellipta and incruse ellipta- not avaliable inpt. Also on methylpred, albuterol, duonebs, montelukast   -Continue methylprednisolone 8 mg daily   -Start bactrim for PJP ppx on chronic steroids   -albuterol inhaler   -continue montelukast   -Duoneb as needed   -Pulmonary consult for inpatient regimen

## 2024-10-09 NOTE — ED PROVIDER NOTE - NSICDXPASTSURGICALHX_GEN_ALL_CORE_FT
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    H/O pelvic surgery 5 years ago - s/p fracture    H/O total knee replacement, bilateral 5 years ago    History of partial hysterectomy 30 years ago - fibroids    History of sinus surgery multiple sinus surgeries    History of tracheomalacia 2015 - attempted tracheal stenting ([facility])- course complicated by obstruction, respiratory failure, multiple CPR attempts -  stent discontinued; 10/20/2016 Tracheobronchoplasty (Prolene Mesh) performed at [facility] by Dr Zapien    Rectal bleeding exam under anesthesia ([facility]) 2/2018    S/P aortic valve replacement     S/P bronchoscopy 6/5/2018 - [place] (Dr Zapien) no evidence of tracheobronchomalacia in trachea or bronchial tubes    S/P TAVR (transcatheter aortic valve replacement)

## 2024-10-09 NOTE — H&P ADULT - HISTORY OF PRESENT ILLNESS
Shirley Bloom is a 74 year old female with a past medical history of  type a aortic dissection status postrepair, status post TAVR, asthma on chronic steroids, bronchiectasis, recurrent pneumonia, tracheomalacia status post tracheoplasty, diabetes, A-fib on Eliquis, colorectal cancer status post colostomy who presents with shortness of breath,  Shirley Bloom is a 74 year old female with a past medical history of  type a aortic dissection status postrepair, status post TAVR in 2023, asthma on chronic steroids, bronchiectasis, recurrent pneumonia, tracheomalacia status post tracheoplasty, diabetes, A-fib on Eliquis, colorectal cancer status post colostomy, seizures, HTN, HLD who presents with shortness of breath, sore throat, and cough productive of yellow sputum for the past 2 days. Patient states her daughter noticed she had increased work of breathing so she brought her in to see her pulmonologist who referred her to the ED for further management. Patient states she was admitted to [facility] last week for chest pressure. On review of systems, patient reports nausea and constipation with her last bowel movement 2 days ago. Per patient, she denies any vision changes, chest pain, abdominal pain, vomiting, diarrhea, rashes, dysuria, hematuria, or blood in stool.     In the ED, Tmax is 100.1 F, blood pressure range between 100/68 to 111/53, RR rate in low 20s, sating in low 90s on room air. Labs notable for leukocytosis, anemia to 10.1, proBNP of 1200, RVP negative. Patient was given  mL bolus and started on aztreonam antibiotic. Patient had a CT chest which revealed New ill-defined peribronchovascular groundglass nodular opacities throughout both lungs, likely infectious.

## 2024-10-09 NOTE — ED ADULT NURSE NOTE - NSFALLRISKINTERV_ED_ALL_ED
Assistance OOB with selected safe patient handling equipment if applicable/Assistance with ambulation/Communicate fall risk and risk factors to all staff, patient, and family/Provide visual cue: yellow wristband, yellow gown, etc/Reinforce activity limits and safety measures with patient and family/Call bell, personal items and telephone in reach/Instruct patient to call for assistance before getting out of bed/chair/stretcher/Non-slip footwear applied when patient is off stretcher/[place] to call system/Physically safe environment - no spills, clutter or unnecessary equipment/Purposeful Proactive Rounding/Room/bathroom lighting operational, light cord in reach

## 2024-10-09 NOTE — ED PROVIDER NOTE - ATTENDING APP SHARED VISIT CONTRIBUTION OF CARE
73-year-old female with multiple medical problems including colostomy, atrial fibs seizure COPD Sent today by Dr. Allison his pulmonologist from his office noted to be shortness of breath satting in the low 90s here discussed with his daughter over the phone and his son at the bedside.  Patient was weak confused several days ago which improved associated with chest pain shortness of breath since that time.  Patient had multiple pneumonias in the past noted to have rhonchi at the left base possible pneumonia with low-grade oral temp sepsis workup initiated chest x-ray Noncon chest no wheezing at this time no difficulty breathing right now at baseline mental status nonfocal neuroexam no history of trauma or falls

## 2024-10-09 NOTE — H&P ADULT - NSHPLABSRESULTS_GEN_ALL_CORE
LABS:                      10.1   14.89 )-----------( 238      ( 09 Oct 2024 10:07 )             33.8       10-09    136  |  101  |  15  ----------------------------<  255[H]  4.0   |  22  |  0.72    Ca    8.8      09 Oct 2024 10:07    TPro  6.5  /  Alb  3.4  /  TBili  0.4  /  DBili  x   /  AST  12  /  ALT  11  /  AlkPhos  61  10-09              Urinalysis Basic - ( 09 Oct 2024 10:07 )    Color: x / Appearance: x / SG: x / pH: x  Gluc: 255 mg/dL / Ketone: x  / Bili: x / Urobili: x   Blood: x / Protein: x / Nitrite: x   Leuk Esterase: x / RBC: x / WBC x   Sq Epi: x / Non Sq Epi: x / Bacteria: x    PT/INR - ( 09 Oct 2024 10:07 )   PT: 21.3 sec;   INR: 1.86 ratio         PTT - ( 09 Oct 2024 10:07 )  PTT:37.3 sec    CAPILLARY BLOOD GLUCOSE    CT Chest No Cont:   ACC: [id number] EXAM:  CT CHEST   ORDERED BY:  MEHRDAD LIU     PROCEDURE DATE:  10/09/2024      INTERPRETATION:  INDICATION: Evaluate for infection    TECHNIQUE: A volumetric CT acquisition of the chest was obtained from the   thoracic inlet to the upper abdomen without the use of intravenous   contrast. Coronal and sagittal reconstructed images are provided.    COMPARISON: Chest CTA 8/4/2024    FINDINGS:    Lungs/Airways/Pleura: Mild apical paraseptal emphysema. Trace left   pleural effusion. No pneumothorax. Tracheal mucous secretions.   Ill-defined nodular groundglass opacities are noted throughout both lungs   (predominantly the upper lobes since prior segment lower lobes) in a   peribronchovascular distribution, new from priorstudy. No cavitation.   Mild diffuse bronchial wall thickening. Stable scarring in the lateral   right middle and lower lobes    Mediastinum/Lymph nodes: New subcentimeter mediastinal nodes (i.e.   subaortic 301:30), likely reactive.    Heart and Vessels: Status post ascending aortic replacement and TAVR.   Known chronic thoracoabdominal dissection, as better evaluated on the   prior contrast-enhanced CTA. Coronary arterial and mitral annular   calcification. No pericardial effusion.    Upper Abdomen: Unremarkable.    Osseous structures and Soft Tissues: Sternotomy.    IMPRESSION:  New ill-defined peribronchovascular groundglass nodular opacities   throughout both lungs, likely infectious. Follow-up chest CT in 8-12   weeks to resolution.    --- End of Report ---    REFUGIO LEACH M.D., Attending Radiologist  This document has been electronically signed. Oct  9 2024  1:31PM (10-09-24 @ 13:18)

## 2024-10-09 NOTE — H&P ADULT - NSHPREVIEWOFSYSTEMS_GEN_ALL_CORE
REVIEW OF SYSTEMS:    CONSTITUTIONAL: No chills  EYES/ENT: No visual changes;  No vertigo or throat pain   NECK: No pain or stiffness  RESPIRATORY: No wheezing, hemoptysis;  CARDIOVASCULAR: No chest pain or palpitations  GASTROINTESTINAL: No abdominal or epigastric pain. No vomiting, or hematemesis; No diarrhea. No melena or hematochezia.  GENITOURINARY: No dysuria, frequency or hematuria  NEUROLOGICAL: No numbness  SKIN: No itching, rashes

## 2024-10-09 NOTE — H&P ADULT - NSHPPHYSICALEXAM_GEN_ALL_CORE
VITALS:   T(C): 37 (10-09-24 @ 15:15), Max: 37.8 (10-09-24 @ 08:29)  HR: 68 (10-09-24 @ 15:15) (67 - 68)  BP: 101/63 (10-09-24 @ 15:15) (100/68 - 111/53)  RR: 18 (10-09-24 @ 15:15) (18 - 24)  SpO2: 95% (10-09-24 @ 15:15) (93% - 99%)    GENERAL: NAD, lying in bed comfortably  HEAD:  Normocephalic  EYES: Sclera clear  ENT: Moist mucous membranes  NECK: Supple, No JVD  CHEST/LUNG: Course breath sounds bilaterally, No rales, rhonchi, wheezing, or rubs.  HEART: Regular rate and rhythm; No murmurs, rubs, or gallops  ABDOMEN: BSx4; Soft, nontender, nondistended, +Colostomy bag in place   EXTREMITIES:  2+ Peripheral Pulses, brisk capillary refill. +Tenderness to palpation of RLE, no erythema, swelling or warmth   NERVOUS SYSTEM:  A&Ox3, no focal deficits   SKIN: No rashes or lesions

## 2024-10-09 NOTE — H&P ADULT - PROBLEM SELECTOR PLAN 5
On lantus 30 units in morning and 8 units in the evening   -Continue 24 units in morning and 6 units nightly   -ISS   -A1c

## 2024-10-10 LAB
A1C WITH ESTIMATED AVERAGE GLUCOSE RESULT: 7.9 % — HIGH (ref 4–5.6)
A1C WITH ESTIMATED AVERAGE GLUCOSE RESULT: 8 % — HIGH (ref 4–5.6)
ADD ON TEST-SPECIMEN IN LAB: SIGNIFICANT CHANGE UP
ALBUMIN SERPL ELPH-MCNC: 3.4 G/DL — SIGNIFICANT CHANGE UP (ref 3.3–5)
ALP SERPL-CCNC: 56 U/L — SIGNIFICANT CHANGE UP (ref 40–120)
ALT FLD-CCNC: 12 U/L — SIGNIFICANT CHANGE UP (ref 10–45)
ANION GAP SERPL CALC-SCNC: 11 MMOL/L — SIGNIFICANT CHANGE UP (ref 5–17)
AST SERPL-CCNC: 13 U/L — SIGNIFICANT CHANGE UP (ref 10–40)
BASOPHILS # BLD AUTO: 0.01 K/UL — SIGNIFICANT CHANGE UP (ref 0–0.2)
BASOPHILS NFR BLD AUTO: 0.1 % — SIGNIFICANT CHANGE UP (ref 0–2)
BILIRUB SERPL-MCNC: 0.3 MG/DL — SIGNIFICANT CHANGE UP (ref 0.2–1.2)
BUN SERPL-MCNC: 9 MG/DL — SIGNIFICANT CHANGE UP (ref 7–23)
CALCIUM SERPL-MCNC: 8.5 MG/DL — SIGNIFICANT CHANGE UP (ref 8.4–10.5)
CHLORIDE SERPL-SCNC: 104 MMOL/L — SIGNIFICANT CHANGE UP (ref 96–108)
CO2 SERPL-SCNC: 25 MMOL/L — SIGNIFICANT CHANGE UP (ref 22–31)
CREAT SERPL-MCNC: 0.61 MG/DL — SIGNIFICANT CHANGE UP (ref 0.5–1.3)
EGFR: 93 ML/MIN/1.73M2 — SIGNIFICANT CHANGE UP
EOSINOPHIL # BLD AUTO: 0.06 K/UL — SIGNIFICANT CHANGE UP (ref 0–0.5)
EOSINOPHIL NFR BLD AUTO: 0.6 % — SIGNIFICANT CHANGE UP (ref 0–6)
ESTIMATED AVERAGE GLUCOSE: 180 MG/DL — HIGH (ref 68–114)
ESTIMATED AVERAGE GLUCOSE: 183 MG/DL — HIGH (ref 68–114)
FERRITIN SERPL-MCNC: 75 NG/ML — SIGNIFICANT CHANGE UP (ref 13–330)
FLUAV AG NPH QL: SIGNIFICANT CHANGE UP
FLUBV AG NPH QL: SIGNIFICANT CHANGE UP
GLUCOSE BLDC GLUCOMTR-MCNC: 126 MG/DL — HIGH (ref 70–99)
GLUCOSE BLDC GLUCOMTR-MCNC: 134 MG/DL — HIGH (ref 70–99)
GLUCOSE BLDC GLUCOMTR-MCNC: 194 MG/DL — HIGH (ref 70–99)
GLUCOSE BLDC GLUCOMTR-MCNC: 240 MG/DL — HIGH (ref 70–99)
GLUCOSE BLDC GLUCOMTR-MCNC: 242 MG/DL — HIGH (ref 70–99)
GLUCOSE SERPL-MCNC: 213 MG/DL — HIGH (ref 70–99)
GRAM STN FLD: SIGNIFICANT CHANGE UP
HCT VFR BLD CALC: 32.6 % — LOW (ref 34.5–45)
HGB BLD-MCNC: 9.9 G/DL — LOW (ref 11.5–15.5)
IMM GRANULOCYTES NFR BLD AUTO: 0.7 % — SIGNIFICANT CHANGE UP (ref 0–0.9)
IRON SATN MFR SERPL: 15 UG/DL — LOW (ref 30–160)
IRON SATN MFR SERPL: 6 % — LOW (ref 14–50)
LEGIONELLA AG UR QL: NEGATIVE — SIGNIFICANT CHANGE UP
LYMPHOCYTES # BLD AUTO: 0.76 K/UL — LOW (ref 1–3.3)
LYMPHOCYTES # BLD AUTO: 7.5 % — LOW (ref 13–44)
MAGNESIUM SERPL-MCNC: 2 MG/DL — SIGNIFICANT CHANGE UP (ref 1.6–2.6)
MCHC RBC-ENTMCNC: 22.1 PG — LOW (ref 27–34)
MCHC RBC-ENTMCNC: 30.4 GM/DL — LOW (ref 32–36)
MCV RBC AUTO: 72.9 FL — LOW (ref 80–100)
MONOCYTES # BLD AUTO: 0.74 K/UL — SIGNIFICANT CHANGE UP (ref 0–0.9)
MONOCYTES NFR BLD AUTO: 7.3 % — SIGNIFICANT CHANGE UP (ref 2–14)
MRSA PCR RESULT.: DETECTED
NEUTROPHILS # BLD AUTO: 8.45 K/UL — HIGH (ref 1.8–7.4)
NEUTROPHILS NFR BLD AUTO: 83.8 % — HIGH (ref 43–77)
NRBC # BLD: 0 /100 WBCS — SIGNIFICANT CHANGE UP (ref 0–0)
PHOSPHATE SERPL-MCNC: 2 MG/DL — LOW (ref 2.5–4.5)
PLATELET # BLD AUTO: 270 K/UL — SIGNIFICANT CHANGE UP (ref 150–400)
POTASSIUM SERPL-MCNC: 3.8 MMOL/L — SIGNIFICANT CHANGE UP (ref 3.5–5.3)
POTASSIUM SERPL-SCNC: 3.8 MMOL/L — SIGNIFICANT CHANGE UP (ref 3.5–5.3)
PROT SERPL-MCNC: 6.4 G/DL — SIGNIFICANT CHANGE UP (ref 6–8.3)
RBC # BLD: 4.47 M/UL — SIGNIFICANT CHANGE UP (ref 3.8–5.2)
RBC # FLD: 18.7 % — HIGH (ref 10.3–14.5)
RSV RNA NPH QL NAA+NON-PROBE: SIGNIFICANT CHANGE UP
S AUREUS DNA NOSE QL NAA+PROBE: DETECTED
SARS-COV-2 RNA SPEC QL NAA+PROBE: SIGNIFICANT CHANGE UP
SODIUM SERPL-SCNC: 140 MMOL/L — SIGNIFICANT CHANGE UP (ref 135–145)
SPECIMEN SOURCE: SIGNIFICANT CHANGE UP
T4 AB SER-ACNC: 6.5 UG/DL — SIGNIFICANT CHANGE UP (ref 4.6–12)
TIBC SERPL-MCNC: 273 UG/DL — SIGNIFICANT CHANGE UP (ref 220–430)
TRANSFERRIN SERPL-MCNC: 226 MG/DL — SIGNIFICANT CHANGE UP (ref 200–360)
TSH SERPL-MCNC: 1.84 UIU/ML — SIGNIFICANT CHANGE UP (ref 0.27–4.2)
UIBC SERPL-MCNC: 258 UG/DL — SIGNIFICANT CHANGE UP (ref 110–370)
WBC # BLD: 10.09 K/UL — SIGNIFICANT CHANGE UP (ref 3.8–10.5)
WBC # FLD AUTO: 10.09 K/UL — SIGNIFICANT CHANGE UP (ref 3.8–10.5)

## 2024-10-10 PROCEDURE — 93971 EXTREMITY STUDY: CPT | Mod: 26,RT

## 2024-10-10 PROCEDURE — 99223 1ST HOSP IP/OBS HIGH 75: CPT | Mod: GC

## 2024-10-10 PROCEDURE — 99233 SBSQ HOSP IP/OBS HIGH 50: CPT

## 2024-10-10 RX ORDER — CHLORHEXIDINE GLUCONATE ORAL RINSE 1.2 MG/ML
1 SOLUTION DENTAL DAILY
Refills: 0 | Status: DISCONTINUED | OUTPATIENT
Start: 2024-10-10 | End: 2024-10-15

## 2024-10-10 RX ORDER — FLUTICASONE PROPION/SALMETEROL 100-50 MCG
1 BLISTER, WITH INHALATION DEVICE INHALATION
Refills: 0 | Status: DISCONTINUED | OUTPATIENT
Start: 2024-10-10 | End: 2024-10-15

## 2024-10-10 RX ORDER — FERROUS SULFATE 325(65) MG
325 TABLET ORAL DAILY
Refills: 0 | Status: DISCONTINUED | OUTPATIENT
Start: 2024-10-10 | End: 2024-10-15

## 2024-10-10 RX ORDER — SOD PHOS DI, MONO/K PHOS MONO 250 MG
1 TABLET ORAL ONCE
Refills: 0 | Status: COMPLETED | OUTPATIENT
Start: 2024-10-10 | End: 2024-10-10

## 2024-10-10 RX ORDER — ERTAPENEM 1 G/1
1000 INJECTION, POWDER, LYOPHILIZED, FOR SOLUTION INTRAMUSCULAR; INTRAVENOUS EVERY 24 HOURS
Refills: 0 | Status: DISCONTINUED | OUTPATIENT
Start: 2024-10-10 | End: 2024-10-11

## 2024-10-10 RX ORDER — ERTAPENEM 1 G/1
1000 INJECTION, POWDER, LYOPHILIZED, FOR SOLUTION INTRAMUSCULAR; INTRAVENOUS EVERY 24 HOURS
Refills: 0 | Status: DISCONTINUED | OUTPATIENT
Start: 2024-10-10 | End: 2024-10-10

## 2024-10-10 RX ORDER — DIPHENHYDRAMINE HCL 12.5MG/5ML
25 LIQUID (ML) ORAL ONCE
Refills: 0 | Status: COMPLETED | OUTPATIENT
Start: 2024-10-10 | End: 2024-10-10

## 2024-10-10 RX ORDER — METHYLPREDNISOLONE ACETATE 80 MG/ML
40 INJECTION, SUSPENSION INTRA-ARTICULAR; INTRALESIONAL; INTRAMUSCULAR; SOFT TISSUE DAILY
Refills: 0 | Status: DISCONTINUED | OUTPATIENT
Start: 2024-10-10 | End: 2024-10-14

## 2024-10-10 RX ADMIN — GABAPENTIN 100 MILLIGRAM(S): 800 TABLET, FILM COATED ORAL at 14:51

## 2024-10-10 RX ADMIN — VALACYCLOVIR 1000 MILLIGRAM(S): 1000 TABLET ORAL at 05:53

## 2024-10-10 RX ADMIN — MULTI VITAMIN/MINERAL SUPPLEMENT WITH ASCORBIC ACID, NIACIN, PYRIDOXINE, PANTOTHENIC ACID, FOLIC ACID, RIBOFLAVIN, THIAMIN, BIOTIN, COBALAMIN AND ZINC. 1 TABLET(S): 60; 20; 12.5; 10; 10; 1.7; 1.5; 1; .3; .006 TABLET, COATED ORAL at 14:41

## 2024-10-10 RX ADMIN — Medication 1 PACKET(S): at 10:37

## 2024-10-10 RX ADMIN — ERTAPENEM 120 MILLIGRAM(S): 1 INJECTION, POWDER, LYOPHILIZED, FOR SOLUTION INTRAMUSCULAR; INTRAVENOUS at 18:15

## 2024-10-10 RX ADMIN — Medication 1000 MILLIGRAM(S): at 17:19

## 2024-10-10 RX ADMIN — Medication 2 TABLET(S): at 22:50

## 2024-10-10 RX ADMIN — Medication 75 MILLIGRAM(S): at 14:41

## 2024-10-10 RX ADMIN — Medication 1 DOSE(S): at 17:20

## 2024-10-10 RX ADMIN — Medication 2 PUFF(S): at 17:19

## 2024-10-10 RX ADMIN — Medication 500 MILLIGRAM(S): at 14:42

## 2024-10-10 RX ADMIN — PANTOPRAZOLE SODIUM 40 MILLIGRAM(S): 40 TABLET, DELAYED RELEASE ORAL at 05:53

## 2024-10-10 RX ADMIN — INSULIN GLARGINE 6 UNIT(S): 300 INJECTION, SOLUTION SUBCUTANEOUS at 22:50

## 2024-10-10 RX ADMIN — Medication 17 GRAM(S): at 14:43

## 2024-10-10 RX ADMIN — Medication 325 MILLIGRAM(S): at 14:43

## 2024-10-10 RX ADMIN — Medication 2: at 17:11

## 2024-10-10 RX ADMIN — APIXABAN 5 MILLIGRAM(S): 5 TABLET, FILM COATED ORAL at 17:19

## 2024-10-10 RX ADMIN — GABAPENTIN 100 MILLIGRAM(S): 800 TABLET, FILM COATED ORAL at 22:49

## 2024-10-10 RX ADMIN — Medication 2: at 14:37

## 2024-10-10 RX ADMIN — Medication 0.5 MILLIGRAM(S): at 05:54

## 2024-10-10 RX ADMIN — TIOTROPIUM BROMIDE INHALATION SPRAY 2 PUFF(S): 3.12 SPRAY, METERED RESPIRATORY (INHALATION) at 14:39

## 2024-10-10 RX ADMIN — GABAPENTIN 100 MILLIGRAM(S): 800 TABLET, FILM COATED ORAL at 05:53

## 2024-10-10 RX ADMIN — Medication 1000 MILLIGRAM(S): at 05:54

## 2024-10-10 RX ADMIN — Medication 200 MILLIGRAM(S): at 05:53

## 2024-10-10 RX ADMIN — ATORVASTATIN CALCIUM 20 MILLIGRAM(S): 10 TABLET, FILM COATED ORAL at 22:50

## 2024-10-10 RX ADMIN — INSULIN GLARGINE 24 UNIT(S): 300 INJECTION, SOLUTION SUBCUTANEOUS at 08:39

## 2024-10-10 RX ADMIN — APIXABAN 5 MILLIGRAM(S): 5 TABLET, FILM COATED ORAL at 05:53

## 2024-10-10 RX ADMIN — Medication 2 PUFF(S): at 14:40

## 2024-10-10 RX ADMIN — VALACYCLOVIR 1000 MILLIGRAM(S): 1000 TABLET ORAL at 17:19

## 2024-10-10 RX ADMIN — Medication 200 MILLIGRAM(S): at 14:39

## 2024-10-10 RX ADMIN — MONTELUKAST SODIUM 10 MILLIGRAM(S): 10 TABLET, FILM COATED ORAL at 14:41

## 2024-10-10 RX ADMIN — Medication 25 MILLIGRAM(S): at 17:17

## 2024-10-10 RX ADMIN — Medication 1: at 08:39

## 2024-10-10 RX ADMIN — Medication 2 PUFF(S): at 05:54

## 2024-10-10 RX ADMIN — Medication 200 MILLIGRAM(S): at 22:49

## 2024-10-10 RX ADMIN — Medication 1 DOSE(S): at 05:54

## 2024-10-10 RX ADMIN — METHYLPREDNISOLONE ACETATE 8 MILLIGRAM(S): 80 INJECTION, SUSPENSION INTRA-ARTICULAR; INTRALESIONAL; INTRAMUSCULAR; SOFT TISSUE at 05:53

## 2024-10-10 NOTE — CONSULT NOTE ADULT - SUBJECTIVE AND OBJECTIVE BOX
HPI:  74 year old female with Type A aortic dissection (s/p repair 2023), TAVR (2023), MM, asthma, bronchiectasis, tracheomalacia (s/p tracheoplasty), DM, Afib (eliquis), colorectal ca (s/p colostomy), seizures, HTN, HLD presents with 2 days of SOB, sore throat and productive yellow sputum. Patient saw her pulmonologist Dr. Allison given her symptoms who recommended her go to hospital for evaluation. Patient reports going to [facility] last week due to chest pain and was found to have another dissection which was not surgically intervened on. Was supposed to start taking ohtuvayre but reportedly no taking yet. Takes breo, incruse ellipta, tezpire monthly, methylpred 8mg, and montelukast with good compliance. No history of smoking, alcohol or recreational drug use. From [place] and immigrated to [place] at age 16. Is retired, worked as a  and denies occupational exposure. Has left eye prosthesis due to injury due to bracelet causing laceration/trauma to eye requiring removal.     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      H/O pelvic surgery  5 years ago - s/p fracture      History of tracheomalacia  2015 - attempted tracheal stenting ([facility])- course complicated by obstruction, respiratory failure, multiple CPR attempts -  stent discontinued; 10/20/2016 Tracheobronchoplasty (Prolene Mesh) performed at [facility] by Dr Zapien      S/P bronchoscopy  6/5/2018 - [place] (Dr Zapien) no evidence of tracheobronchomalacia in trachea or bronchial tubes      Rectal bleeding  exam under anesthesia ([facility]) 2/2018      S/P TAVR (transcatheter aortic valve replacement)      S/P aortic valve replacement          FAMILY HISTORY:  Family history of asthma    Family history of breast cancer (Sibling)    Family history of diabetes mellitus type II        SOCIAL HISTORY:  Smoking: [ ] Never Smoked [ ] Former Smoker (__ packs x ___ years) [ ] Current Smoker  (__ packs x ___ years)  Substance Use: [ ] Never Used [ ] Used ____  EtOH Use:  Marital Status: [ ] Single [ ]  [ ]  [ ]   Sexual History:   Occupation:  Recent Travel:  Country of Birth:  Advance Directives:    Allergies    aspirin (Short breath)  OxyContin (Unknown)  Dilaudid (Short breath)  Valium (Short breath)  tetanus toxoid (Short breath)  ampicillin (Unknown)  cefepime (Anaphylaxis)  Avelox (Short breath; Pruritus)  shellfish (Anaphylaxis)  penicillin (Anaphylaxis)  codeine (Short breath)  Percocet (Unknown)  iodine (Short breath; Swelling)  meropenem (Unknown)    Intolerances        HOME MEDICATIONS:  Home Medications:  Basaglar KwikPen 100 units/mL subcutaneous solution: 30 unit(s) subcutaneous once a day (at bedtime) (09 Oct 2024 19:38)  budesonide 0.5 mg/2 mL inhalation suspension: 2 milliliter(s) by nebulizer 2 times a day (09 Oct 2024 19:38)  DuoNeb 0.5 mg-2.5 mg/3 mL inhalation solution: 3 milliliter(s) by nebulizer 4 times a day (09 Oct 2024 19:38)  Eliquis 5 mg oral tablet: 1 tab(s) orally 2 times a day (09 Oct 2024 19:38)  formoterol 20 mcg/2 mL inhalation solution: 2 milliliter(s) inhaled every 12 hours (09 Oct 2024 19:38)  gabapentin 100 mg oral capsule: 1 cap(s) orally every 8 hours (09 Oct 2024 19:35)  glycopyrrolate 1 mg oral tablet: 1 tab(s) orally 3 times a day (09 Oct 2024 19:38)  HumaLOG KwikPen 100 units/mL injectable solution: 9 unit(s) subcutaneous 3 times a day take before meals three times a day (09 Oct 2024 19:38)  Incruse Ellipta 62.5 mcg/inh inhalation powder: 1 inhaled once a day (09 Oct 2024 19:38)  lacosamide 100 mg oral tablet: 1 tab(s) orally 2 times a day (09 Oct 2024 19:38)  levETIRAcetam 750 mg oral tablet, extended release: 2 tab(s) orally 2 times a day (09 Oct 2024 19:38)  methylPREDNISolone 8 mg oral tablet: 1 tab(s) orally once a day (09 Oct 2024 19:38)  mexiletine 200 mg oral capsule: 1 cap(s) orally every 8 hours (09 Oct 2024 19:38)  montelukast 10 mg oral tablet: 1 tab(s) orally once a day (09 Oct 2024 19:38)  rosuvastatin 5 mg oral tablet: 1 tab(s) orally once a day (09 Oct 2024 19:38)  sertraline 50 mg oral tablet: 1 tab(s) orally once a day (09 Oct 2024 19:35)      REVIEW OF SYSTEMS:  All systems negative except as documented above.    OBJECTIVE:  ICU Vital Signs Last 24 Hrs  T(C): 36.7 (10 Oct 2024 16:38), Max: 36.9 (09 Oct 2024 18:30)  T(F): 98 (10 Oct 2024 16:38), Max: 98.5 (10 Oct 2024 01:18)  HR: 81 (10 Oct 2024 16:38) (62 - 81)  BP: 138/69 (10 Oct 2024 16:38) (104/62 - 138/69)  BP(mean): --  ABP: --  ABP(mean): --  RR: 18 (10 Oct 2024 16:38) (18 - 18)  SpO2: 97% (10 Oct 2024 16:38) (92% - 97%)    O2 Parameters below as of 10 Oct 2024 10:27  Patient On (Oxygen Delivery Method): room air              CAPILLARY BLOOD GLUCOSE      POCT Blood Glucose.: 240 mg/dL (10 Oct 2024 16:27)      PHYSICAL EXAM:  General: NAD  HEENT: prosthetic left eye, sclera anicteric  Neck: supple  Cardiovascular: RR  Respiratory: bilateral diffuse wheezing  Abdomen: soft  Extremities: warm and well perfused, no edema, no clubbing  Skin: no rashes  Neurological: AOx3, no focal deficits    HOSPITAL MEDICATIONS:  Standing Meds:  albuterol    90 MICROgram(s) HFA Inhaler 2 Puff(s) Inhalation every 6 hours  apixaban 5 milliGRAM(s) Oral every 12 hours  ascorbic acid 500 milliGRAM(s) Oral daily  atorvastatin 20 milliGRAM(s) Oral at bedtime  chlorhexidine 2% Cloths 1 Application(s) Topical daily  clopidogrel Tablet 75 milliGRAM(s) Oral daily  dextrose 5%. 1000 milliLiter(s) IV Continuous <Continuous>  dextrose 5%. 1000 milliLiter(s) IV Continuous <Continuous>  dextrose 50% Injectable 25 Gram(s) IV Push once  dextrose 50% Injectable 12.5 Gram(s) IV Push once  dextrose 50% Injectable 25 Gram(s) IV Push once  ertapenem  IVPB 1000 milliGRAM(s) IV Intermittent every 24 hours  ferrous    sulfate 325 milliGRAM(s) Oral daily  fluticasone propionate/ salmeterol 250-50 MICROgram(s) Diskus 1 Dose(s) Inhalation two times a day  gabapentin 100 milliGRAM(s) Oral every 8 hours  glucagon  Injectable 1 milliGRAM(s) IntraMuscular once  insulin glargine Injectable (LANTUS) 6 Unit(s) SubCutaneous at bedtime  insulin glargine Injectable (LANTUS) 24 Unit(s) SubCutaneous every morning  insulin lispro (ADMELOG) corrective regimen sliding scale   SubCutaneous three times a day before meals  insulin lispro (ADMELOG) corrective regimen sliding scale   SubCutaneous at bedtime  levETIRAcetam 1000 milliGRAM(s) Oral two times a day  methylPREDNISolone sodium succinate Injectable 40 milliGRAM(s) IV Push daily  mexiletine 200 milliGRAM(s) Oral every 8 hours  montelukast 10 milliGRAM(s) Oral daily  multivitamin 1 Tablet(s) Oral daily  pantoprazole    Tablet 40 milliGRAM(s) Oral before breakfast  polyethylene glycol 3350 17 Gram(s) Oral daily  senna 2 Tablet(s) Oral at bedtime  tiotropium 2.5 MICROgram(s) Inhaler 2 Puff(s) Inhalation daily  valACYclovir 1000 milliGRAM(s) Oral two times a day      PRN Meds:  dextrose Oral Gel 15 Gram(s) Oral once PRN  melatonin 3 milliGRAM(s) Oral at bedtime PRN      LABS:                        9.9    10.09 )-----------( 270      ( 10 Oct 2024 09:19 )             32.6     Hgb Trend: 9.9<--, 10.1<--  10-10    140  |  104  |  9   ----------------------------<  213[H]  3.8   |  25  |  0.61    Ca    8.5      10 Oct 2024 09:19  Phos  2.0     10-10  Mg     2.0     10-10    TPro  6.4  /  Alb  3.4  /  TBili  0.3  /  DBili  x   /  AST  13  /  ALT  12  /  AlkPhos  56  10-10    Creatinine Trend: 0.61<--, 0.72<--  PT/INR - ( 09 Oct 2024 10:07 )   PT: 21.3 sec;   INR: 1.86 ratio         PTT - ( 09 Oct 2024 10:07 )  PTT:37.3 sec  Urinalysis Basic - ( 10 Oct 2024 09:19 )    Color: x / Appearance: x / SG: x / pH: x  Gluc: 213 mg/dL / Ketone: x  / Bili: x / Urobili: x   Blood: x / Protein: x / Nitrite: x   Leuk Esterase: x / RBC: x / WBC x   Sq Epi: x / Non Sq Epi: x / Bacteria: x        Venous Blood Gas:  10-09 @ 09:52  7.41/42/64/27/91.4  VBG Lactate: 1.0      MICROBIOLOGY:     Culture - Blood (collected 09 Oct 2024 09:56)  Source: .Blood BLOOD  Preliminary Report (10 Oct 2024 14:02):    No growth at 24 hours    Culture - Blood (collected 09 Oct 2024 09:50)  Source: .Blood BLOOD  Preliminary Report (10 Oct 2024 14:02):    No growth at 24 hours        RADIOLOGY:  [x] Reviewed and interpreted by me

## 2024-10-10 NOTE — PROGRESS NOTE ADULT - PROBLEM SELECTOR PLAN 2
Home regimen: -ohtuvayre inhalation suspension, breo ellipta and incruse ellipta- not avaliable inpt. Also on methylpred, albuterol, duonebs, montelukast   -Continue methylprednisolone 8 mg daily   -Start bactrim for PJP ppx on chronic steroids   -albuterol inhaler   -continue montelukast   -Advair one dose BID   -Tiotropium 2 buffs bid   -Pulmonary consult for inpatient regimen

## 2024-10-10 NOTE — PROGRESS NOTE ADULT - PROBLEM SELECTOR PLAN 1
Presents with cough productive of yellow sputum, sore throat, and shortness of breath. Afebrile (Tmax 100.1 F) in the ED. Leukocytosis to 14.89. CT Chest revealed new ill-defined peribronchovascular groundglass nodular opacities   throughout both lungs, likely infectious. Sepsis given patient met SIRS criteria in the setting of pneumonia   -Per ID, start levofloxacin instead of aztreonam and cipro   -Blood cultures x2 obtained   -Sputum culture, legionella, strep ordered   -MRSA swab ordered   -Will trend WBC count daily   -Low threshold to start vancomycin for MRSA coverage given recent hospitalization if decompensates  -procalcitonin  -ID consult given patient on chronic ciprofloxacin at home, has a history of multiple allergies and multiple recurrent pneumonias

## 2024-10-10 NOTE — PROGRESS NOTE ADULT - SUBJECTIVE AND OBJECTIVE BOX
RAYRAY RODRIGUEZ  74y  Female    Complaints:  Subjective:     No acute o/n events. Pt denies subj fevers/chills, HA, dizziness, chest pain, palpitations, n/v, abd pain, dysuria, diarrhea    FAMILY HISTORY:  Family history of asthma    Family history of breast cancer (Sibling)    Family history of diabetes mellitus type II    74yVital Signs Last 24 Hrs  T(C): 36.9 (10 Oct 2024 01:18), Max: 37.8 (09 Oct 2024 08:29)  T(F): 98.5 (10 Oct 2024 01:18), Max: 100.1 (09 Oct 2024 08:29)  HR: 62 (10 Oct 2024 01:18) (62 - 68)  BP: 108/63 (10 Oct 2024 01:18) (100/68 - 126/65)  BP(mean): 73 (09 Oct 2024 13:25) (73 - 73)  RR: 18 (10 Oct 2024 01:18) (18 - 24)  SpO2: 92% (10 Oct 2024 01:18) (92% - 99%)    Parameters below as of 10 Oct 2024 01:18  Patient On (Oxygen Delivery Method): room air    PHYSICAL EXAM:  GENERAL: NAD, lying in bed comfortably  HEAD:  Normocephalic  EYES: Sclera clear  ENT: Moist mucous membranes  NECK: Supple, No JVD  CHEST/LUNG: Course breath sounds bilaterally, No rales, rhonchi, wheezing, or rubs.  HEART: Regular rate and rhythm; No murmurs, rubs, or gallops  ABDOMEN: BSx4; Soft, nontender, nondistended, +Colostomy bag in place   EXTREMITIES:  2+ Peripheral Pulses, brisk capillary refill. +Tenderness to palpation of RLE, no erythema, swelling or warmth   NERVOUS SYSTEM:  A&Ox3, no focal deficits   SKIN: No rashes or lesions    Consultant(s) Notes Reviewed:  [x ] YES  [ ] NO  Care Discussed with Consultants/Other Providers [ x] YES  [ ] NO    LABS:                        10.1   14.89 )-----------( 238      ( 09 Oct 2024 10:07 )             33.8       10-09    136  |  101  |  15  ----------------------------<  255[H]  4.0   |  22  |  0.72    Ca    8.8      09 Oct 2024 10:07    TPro  6.5  /  Alb  3.4  /  TBili  0.4  /  DBili  x   /  AST  12  /  ALT  11  /  AlkPhos  61  10-09              Urinalysis Basic - ( 09 Oct 2024 10:07 )    Color: x / Appearance: x / SG: x / pH: x  Gluc: 255 mg/dL / Ketone: x  / Bili: x / Urobili: x   Blood: x / Protein: x / Nitrite: x   Leuk Esterase: x / RBC: x / WBC x   Sq Epi: x / Non Sq Epi: x / Bacteria: x        PT/INR - ( 09 Oct 2024 10:07 )   PT: 21.3 sec;   INR: 1.86 ratio         PTT - ( 09 Oct 2024 10:07 )  PTT:37.3 sec          CAPILLARY BLOOD GLUCOSE      POCT Blood Glucose.: 293 mg/dL (09 Oct 2024 21:30)        CT Chest No Cont:   ACC: [id number] EXAM:  CT CHEST   ORDERED BY:  MEHRDAD LIU     PROCEDURE DATE:  10/09/2024          INTERPRETATION:  INDICATION: Evaluate for infection    TECHNIQUE: A volumetric CT acquisition of the chest was obtained from the   thoracic inlet to the upper abdomen without the use of intravenous   contrast. Coronal and sagittal reconstructed images are provided.    COMPARISON: Chest CTA 8/4/2024    FINDINGS:    Lungs/Airways/Pleura: Mild apical paraseptal emphysema. Trace left   pleural effusion. No pneumothorax. Tracheal mucous secretions.   Ill-defined nodular groundglass opacities are noted throughout both lungs   (predominantly the upper lobes since prior segment lower lobes) in a   peribronchovascular distribution, new from priorstudy. No cavitation.   Mild diffuse bronchial wall thickening. Stable scarring in the lateral   right middle and lower lobes    Mediastinum/Lymph nodes: New subcentimeter mediastinal nodes (i.e.   subaortic 301:30), likely reactive.    Heart and Vessels: Status post ascending aortic replacement and TAVR.   Known chronic thoracoabdominal dissection, as better evaluated on the   prior contrast-enhanced CTA. Coronary arterial and mitral annular   calcification. No pericardial effusion.    Upper Abdomen: Unremarkable.    Osseous structures and Soft Tissues: Sternotomy.    IMPRESSION:  New ill-defined peribronchovascular groundglass nodular opacities   throughout both lungs, likely infectious. Follow-up chest CT in 8-12   weeks to resolution.    --- End of Report ---    REFUGIO LEACH M.D., Attending Radiologist  This document has been electronically signed. Oct  9 2024  1:31PM (10-09-24 @ 13:18)    Female  RADIOLOGY & ADDITIONAL TESTS: No interim imaging    MedsMEDICATIONS  (STANDING):  albuterol    90 MICROgram(s) HFA Inhaler 2 Puff(s) Inhalation every 6 hours  apixaban 5 milliGRAM(s) Oral every 12 hours  ascorbic acid 500 milliGRAM(s) Oral daily  atorvastatin 20 milliGRAM(s) Oral at bedtime  buDESOnide    Inhalation Suspension 0.5 milliGRAM(s) Inhalation two times a day  clopidogrel Tablet 75 milliGRAM(s) Oral daily  dextrose 5%. 1000 milliLiter(s) (100 mL/Hr) IV Continuous <Continuous>  dextrose 5%. 1000 milliLiter(s) (50 mL/Hr) IV Continuous <Continuous>  dextrose 50% Injectable 25 Gram(s) IV Push once  dextrose 50% Injectable 25 Gram(s) IV Push once  dextrose 50% Injectable 12.5 Gram(s) IV Push once  fluticasone propionate/ salmeterol 100-50 MICROgram(s) Diskus 1 Dose(s) Inhalation two times a day  gabapentin 100 milliGRAM(s) Oral every 8 hours  glucagon  Injectable 1 milliGRAM(s) IntraMuscular once  insulin glargine Injectable (LANTUS) 6 Unit(s) SubCutaneous at bedtime  insulin glargine Injectable (LANTUS) 24 Unit(s) SubCutaneous every morning  insulin lispro (ADMELOG) corrective regimen sliding scale   SubCutaneous three times a day before meals  insulin lispro (ADMELOG) corrective regimen sliding scale   SubCutaneous at bedtime  levETIRAcetam 1000 milliGRAM(s) Oral two times a day  levoFLOXacin  Tablet 750 milliGRAM(s) Oral every 24 hours  methylPREDNISolone 8 milliGRAM(s) Oral daily  mexiletine 200 milliGRAM(s) Oral every 8 hours  montelukast 10 milliGRAM(s) Oral daily  multivitamin 1 Tablet(s) Oral daily  pantoprazole    Tablet 40 milliGRAM(s) Oral before breakfast  senna 2 Tablet(s) Oral at bedtime  tiotropium 2.5 MICROgram(s) Inhaler 2 Puff(s) Inhalation daily  valACYclovir 1000 milliGRAM(s) Oral two times a day    MEDICATIONS  (PRN):  dextrose Oral Gel 15 Gram(s) Oral once PRN Blood Glucose LESS THAN 70 milliGRAM(s)/deciliter  melatonin 3 milliGRAM(s) Oral at bedtime PRN Insomnia      HEALTH ISSUES - PROBLEM Dx:  Pneumonia    Severe asthma    H/O aortic dissection    Mild anemia    Type 2 diabetes mellitus    Atrial fibrillation    Seizure    HLD (hyperlipidemia)    Need for prophylactic measure             RAYRAY RODRIGUEZ  74y  Female    Complaints:  Subjective:     No acute o/n events. Patient this morning is resting comfortably. Patient denies chest pain or abdominal pain.     FAMILY HISTORY:  Family history of asthma    Family history of breast cancer (Sibling)    Family history of diabetes mellitus type II    74yVital Signs Last 24 Hrs  T(C): 36.9 (10 Oct 2024 01:18), Max: 37.8 (09 Oct 2024 08:29)  T(F): 98.5 (10 Oct 2024 01:18), Max: 100.1 (09 Oct 2024 08:29)  HR: 62 (10 Oct 2024 01:18) (62 - 68)  BP: 108/63 (10 Oct 2024 01:18) (100/68 - 126/65)  BP(mean): 73 (09 Oct 2024 13:25) (73 - 73)  RR: 18 (10 Oct 2024 01:18) (18 - 24)  SpO2: 92% (10 Oct 2024 01:18) (92% - 99%)    Parameters below as of 10 Oct 2024 01:18  Patient On (Oxygen Delivery Method): room air    PHYSICAL EXAM:  GENERAL: NAD, lying in bed comfortably  HEAD:  Normocephalic  EYES: Sclera clear  ENT: Moist mucous membranes  NECK: Supple, No JVD  CHEST/LUNG: Course breath sounds bilaterally, No rales, rhonchi, wheezing, or rubs.  HEART: Regular rate and rhythm; No murmurs, rubs, or gallops  ABDOMEN: BSx4; Soft, nontender, nondistended, +Colostomy bag in place   EXTREMITIES:  2+ Peripheral Pulses, brisk capillary refill. +Tenderness to palpation of RLE, no erythema, swelling or warmth   NERVOUS SYSTEM:  A&Ox3, no focal deficits   SKIN: No rashes or lesions    Consultant(s) Notes Reviewed:  [x ] YES  [ ] NO  Care Discussed with Consultants/Other Providers [ x] YES  [ ] NO    LABS:                        10.1   14.89 )-----------( 238      ( 09 Oct 2024 10:07 )             33.8       10-09    136  |  101  |  15  ----------------------------<  255[H]  4.0   |  22  |  0.72    Ca    8.8      09 Oct 2024 10:07    TPro  6.5  /  Alb  3.4  /  TBili  0.4  /  DBili  x   /  AST  12  /  ALT  11  /  AlkPhos  61  10-09              Urinalysis Basic - ( 09 Oct 2024 10:07 )    Color: x / Appearance: x / SG: x / pH: x  Gluc: 255 mg/dL / Ketone: x  / Bili: x / Urobili: x   Blood: x / Protein: x / Nitrite: x   Leuk Esterase: x / RBC: x / WBC x   Sq Epi: x / Non Sq Epi: x / Bacteria: x        PT/INR - ( 09 Oct 2024 10:07 )   PT: 21.3 sec;   INR: 1.86 ratio         PTT - ( 09 Oct 2024 10:07 )  PTT:37.3 sec          CAPILLARY BLOOD GLUCOSE      POCT Blood Glucose.: 293 mg/dL (09 Oct 2024 21:30)        CT Chest No Cont:   ACC: [id number] EXAM:  CT CHEST   ORDERED BY:  MEHRDAD LIU     PROCEDURE DATE:  10/09/2024          INTERPRETATION:  INDICATION: Evaluate for infection    TECHNIQUE: A volumetric CT acquisition of the chest was obtained from the   thoracic inlet to the upper abdomen without the use of intravenous   contrast. Coronal and sagittal reconstructed images are provided.    COMPARISON: Chest CTA 8/4/2024    FINDINGS:    Lungs/Airways/Pleura: Mild apical paraseptal emphysema. Trace left   pleural effusion. No pneumothorax. Tracheal mucous secretions.   Ill-defined nodular groundglass opacities are noted throughout both lungs   (predominantly the upper lobes since prior segment lower lobes) in a   peribronchovascular distribution, new from priorstudy. No cavitation.   Mild diffuse bronchial wall thickening. Stable scarring in the lateral   right middle and lower lobes    Mediastinum/Lymph nodes: New subcentimeter mediastinal nodes (i.e.   subaortic 301:30), likely reactive.    Heart and Vessels: Status post ascending aortic replacement and TAVR.   Known chronic thoracoabdominal dissection, as better evaluated on the   prior contrast-enhanced CTA. Coronary arterial and mitral annular   calcification. No pericardial effusion.    Upper Abdomen: Unremarkable.    Osseous structures and Soft Tissues: Sternotomy.    IMPRESSION:  New ill-defined peribronchovascular groundglass nodular opacities   throughout both lungs, likely infectious. Follow-up chest CT in 8-12   weeks to resolution.    --- End of Report ---    REFUGIO LEACH M.D., Attending Radiologist  This document has been electronically signed. Oct  9 2024  1:31PM (10-09-24 @ 13:18)    Female  RADIOLOGY & ADDITIONAL TESTS: No interim imaging    MedsMEDICATIONS  (STANDING):  albuterol    90 MICROgram(s) HFA Inhaler 2 Puff(s) Inhalation every 6 hours  apixaban 5 milliGRAM(s) Oral every 12 hours  ascorbic acid 500 milliGRAM(s) Oral daily  atorvastatin 20 milliGRAM(s) Oral at bedtime  buDESOnide    Inhalation Suspension 0.5 milliGRAM(s) Inhalation two times a day  clopidogrel Tablet 75 milliGRAM(s) Oral daily  dextrose 5%. 1000 milliLiter(s) (100 mL/Hr) IV Continuous <Continuous>  dextrose 5%. 1000 milliLiter(s) (50 mL/Hr) IV Continuous <Continuous>  dextrose 50% Injectable 25 Gram(s) IV Push once  dextrose 50% Injectable 25 Gram(s) IV Push once  dextrose 50% Injectable 12.5 Gram(s) IV Push once  fluticasone propionate/ salmeterol 100-50 MICROgram(s) Diskus 1 Dose(s) Inhalation two times a day  gabapentin 100 milliGRAM(s) Oral every 8 hours  glucagon  Injectable 1 milliGRAM(s) IntraMuscular once  insulin glargine Injectable (LANTUS) 6 Unit(s) SubCutaneous at bedtime  insulin glargine Injectable (LANTUS) 24 Unit(s) SubCutaneous every morning  insulin lispro (ADMELOG) corrective regimen sliding scale   SubCutaneous three times a day before meals  insulin lispro (ADMELOG) corrective regimen sliding scale   SubCutaneous at bedtime  levETIRAcetam 1000 milliGRAM(s) Oral two times a day  levoFLOXacin  Tablet 750 milliGRAM(s) Oral every 24 hours  methylPREDNISolone 8 milliGRAM(s) Oral daily  mexiletine 200 milliGRAM(s) Oral every 8 hours  montelukast 10 milliGRAM(s) Oral daily  multivitamin 1 Tablet(s) Oral daily  pantoprazole    Tablet 40 milliGRAM(s) Oral before breakfast  senna 2 Tablet(s) Oral at bedtime  tiotropium 2.5 MICROgram(s) Inhaler 2 Puff(s) Inhalation daily  valACYclovir 1000 milliGRAM(s) Oral two times a day    MEDICATIONS  (PRN):  dextrose Oral Gel 15 Gram(s) Oral once PRN Blood Glucose LESS THAN 70 milliGRAM(s)/deciliter  melatonin 3 milliGRAM(s) Oral at bedtime PRN Insomnia      HEALTH ISSUES - PROBLEM Dx:  Pneumonia    Severe asthma    H/O aortic dissection    Mild anemia    Type 2 diabetes mellitus    Atrial fibrillation    Seizure    HLD (hyperlipidemia)    Need for prophylactic measure

## 2024-10-10 NOTE — ADVANCED PRACTICE NURSE CONSULT - REASON FOR CONSULT
Drain management, pouching issues    Shirley Bloom is a 74 year old female with a past medical history of  type a aortic dissection status postrepair, status post TAVR in 2023, asthma on chronic steroids, bronchiectasis, recurrent pneumonia, tracheomalacia status post tracheoplasty, diabetes, A-fib on Eliquis, colorectal cancer status post colostomy, seizures, HTN, HLD who presents with shortness of breath, sore throat, and cough productive of yellow sputum for the past 2 days. Patient states her daughter noticed she had increased work of breathing so she brought her in to see her pulmonologist who referred her to the ED for further management. Patient states she was admitted to [facility] last week for chest pressure. On review of systems, patient reports nausea and constipation with her last bowel movement 2 days ago. Per patient, she denies any vision changes, chest pain, abdominal pain, vomiting, diarrhea, rashes, dysuria, hematuria, or blood in stool.     In the ED, Tmax is 100.1 F, blood pressure range between 100/68 to 111/53, RR rate in low 20s, sating in low 90s on room air. Labs notable for leukocytosis, anemia to 10.1, proBNP of 1200, RVP negative. Patient was given  mL bolus and started on aztreonam antibiotic. Patient had a CT chest which revealed New ill-defined peribronchovascular groundglass nodular opacities throughout both lungs, likely infectious.

## 2024-10-10 NOTE — CONSULT NOTE ADULT - ATTENDING COMMENTS
74F PMH severe persistent asthma on chronic steroids and tezepelumab monthly, bronchiectasis, history of recurrent pneumonia, tracheomalacia s/p tracheoplasty, DM2, A-Fib on apixaban, Type A aortic dissection (s/p repair in 2023), s/p AVR (2022) and TAVR (valve in valve) in Sept 2023, h/o colorectal cancer s/p colectomy and ostomy, and seizures who presents with several days of dyspnea, sore throat, and cough productive of yellow sputum prompting hospitalization. She is adherent with her regimen of Breo Ellipta, Incruse Ellipta, methylprednisolone 8 mg daily, montelukast 10 mg qhs, and tezepelumab injections monthly. Pending to start Ohtuvayre. Found on CT chest to have bilateral ground glass nodular opacities with mucous secretions in trachea consistent with pneumonia. ABG without hypercapnia.     Presentation consistent with pneumonia with superimposed COPD exacerbation.    - Empiric antibiotics with ertapenem (quinolones held given history of chronic aortic dissection)  - Advair 250-50 mcg 1 inhalation twice daily, rinse after use  - Spiriva Respimat 2.5 mcg 2 inhalations once daily   - Albuterol nebs q6h  - Would increase steroids to methylprednisolone 40 mg IV daily for short course, then return to home dose of 8 mg daily  - Continue montelukast 10 mg daily  - Infectious workup, including RVP, sputum cultures, blood cultures, nasal MRSA, urine strep, urine Legionella  - Resume monthly tezepelumab injections following discharge  - Will require repeat CT chest as outpatient to assess for resolution of opacities  - Close outpatient follow-up with Dr. Allison

## 2024-10-10 NOTE — PROGRESS NOTE ADULT - ASSESSMENT
Shirley Bloom is a 74 year old female with a past medical history of  type a aortic dissection status postrepair, status post TAVR, asthma on chronic steroids, bronchiectasis, recurrent pneumonia, tracheomalacia status post tracheoplasty, diabetes, A-fib on Eliquis, colorectal cancer status post colostomy who presents with shortness of breath, sore throat, and cough productive of yellow sputum for the past 2 days. CT Chest remarkable for new ill-defined peribronchovascular groundglass nodular opacities throughout both lungs, likely infectious.  Shirley Bloom is a 74 year old female with a past medical history of  type a aortic dissection status postrepair, status post TAVR, asthma on chronic steroids, bronchiectasis, recurrent pneumonia, tracheomalacia status post tracheoplasty, diabetes, A-fib on Eliquis, colorectal cancer status post colostomy who presents with shortness of breath, sore throat, and cough productive of yellow sputum for the past 2 days. CT Chest remarkable for new ill-defined peribronchovascular ground-glass nodular opacities throughout both lungs, likely infectious.

## 2024-10-10 NOTE — CONSULT NOTE ADULT - ASSESSMENT
74 year old female with Type A aortic dissection (s/p repair 2023), TAVR (2023), MM, asthma, bronchiectasis, tracheomalacia (s/p tracheoplasty), DM, Afib (eliquis), colorectal ca (s/p colostomy), seizures, HTN, HLD presents with 2 days of SOB, sore throat and productive yellow sputum. Patient saw her pulmonologist Dr. Allison given her symptoms who recommended her go to hospital for evaluation.Was supposed to start taking ohtuvayre but reportedly no taking yet. Takes breo, incruse ellipta, tezpire monthly, methylpred 8mg, and montelukast.     On exam, patient is able to speak in full sentences however with diffuse audible wheezing. CT chest with bilateral GGO and mucous secretion in trachea and mediastinal nodes. Has not had fever. blood gas 7.41, co2 42, o2 64. Given respiratory symptoms, yellow sputum, suspect asthma/COPD exacerbation.    #Severe asthma  #COPD  #Bilateral GGO  #Mediastinal nodes  #Bronchiectasis    Recommendations:  - agree with continuing antibiotics (has numerous drug allergies), currently on ertapenem  - Advair 250/50 bid, spiriva 2 puffs daily  - start solumedrol IV 40mg daily  - Please provide patient with incentive spirometer and acapella for airway clearance  - goal spo2 >90%  - Obtain RVP, sputum culture, blood culture, MRSA nares, urine strep/legionella    Recommendations are not final pending attending attestation. 74 year old female with Type A aortic dissection (s/p repair 2023), TAVR (2023), MM, asthma, bronchiectasis, tracheomalacia (s/p tracheoplasty), DM, Afib (eliquis), colorectal ca (s/p colostomy), seizures, HTN, HLD presents with 2 days of SOB, sore throat and productive yellow sputum. Patient saw her pulmonologist Dr. Allison given her symptoms who recommended her go to hospital for evaluation.Was supposed to start taking ohtuvayre but reportedly no taking yet. Takes breo, incruse ellipta, tezpire monthly, methylpred 8mg, and montelukast.     On exam, patient is able to speak in full sentences however with diffuse audible wheezing. CT chest with bilateral GGO and mucous secretion in trachea and mediastinal nodes. Has not had fever. blood gas 7.41, co2 42, o2 64. Given respiratory symptoms, yellow sputum, suspect asthma/COPD exacerbation.    #Severe asthma  #COPD  #Bilateral GGO  #Mediastinal nodes  #Bronchiectasis    Recommendations:  - agree with continuing antibiotics (has numerous drug allergies), currently on ertapenem  - Advair 250/50 bid, spiriva 2 puffs daily  - start solumedrol IV 40mg daily  - Please provide patient with incentive spirometer and acapella for airway clearance  - goal spo2 >90%  - Obtain RVP, sputum culture, blood culture, MRSA nares, urine strep/legionella  - will need repeat CT chest in 6 weeks to assess for resolution    Recommendations are not final pending attending attestation.

## 2024-10-10 NOTE — CONSULT NOTE ADULT - ATTENDING COMMENTS
74 year old female with a past medical history of ESBL proteus bacteremia secondary to aspiration pna in 12/2023, recurrent pna, hx of ESBL proteus mirabilis and MRSA in sputum cultures,  tracheomalacia status post tracheoplasty, COPD/bronchiectasis, asthma and adrenal insuffiency on chronic steroids (solumedrol 8 mg daily),  type a aortic dissection s/p repair, s/p TAVR in 2023 diabetes, A-fib, colorectal cancer status post total colectomy with colostomy, seizures, HTN, HLD who presents with progressively worsening shortness of breath, sore throat, and cough for the past 1 week. Patient states she has a cough at baseline productive of clear sputum but more recently has been productive of yellow sputum. No fevers, chills. Patient was in [facility] last week for chest pressure and states they did some tests and just discharged her. She states she was not treated with any abx. As per HIE, patient was treated for COPD exacerbation.  Chest pressure has resolved since. Denies any recent abx use. Denies any recent travel or sick contacts. Patient came to the hospital yesterday after daughter noted that patient was with increased work of breathing while sleeping two nights ago. She went to see her pulmonologist, Dr. Allison, yesterday who recommended that she come to the hospital for further evaluation.     Multiple abx allergies listed in the chart. Patient states she does not know her allergies.     In the ER:   Vitals: Tmax 37.8 C, Hypoxia requiring 4 L NC  Labs: CBC with leukocytosis to 14.89, microcytic anemia. CMP with serum glucose 255.procal 0.11. RVP negative. Blood cultures x 2 collected and pending results.   A1C 7.9. Urine strpe/legionella antigens, MRSA PCR ordered and pending collection,  Imaging:   -CXR: Few small patchy airspace opacities in the right lower lung which may   reflect infectious process.  -CT chest: New ill-defined peribronchovascular groundglass nodular opacities throughout both lungs, likely infectious. Follow-up chest CT in 8-12 weeks to resolution.    Abx:   Levaquin 750 mg PO q24h (10/10-)   s/p Aztreonam x 1   Valacyclovir ppx 74F with tracheomalacia s/p tracheoplasty, known COPD / bronchiectasis, asthma on chronic steroids (solumedrol 8mg daily), hx Proteus bacteremia secondary to aspiration pneumonia (+esbl), multiple drug allergies, hx adrenal insufficiency.  Recently admitted to [facility] with COPD exacerbation.  No antibiotics.  Now presenting with increased SOB.  No fever.  Briefly required supplemental O2.  Productive cough of yellow sputum.  BC and sputum culture sent.  Given a dose of aztreonam, levaquin.  Patient reports hx itching after ertapenem (no anaphylaxis) but better with benadryl.  No hx allergy to meropenem as documented in the chart.  CT done with b/l GGO / patchy opacities (not really new, seen on prior imaging as well).    COPD exacerbation with hypoxia, sputum production and leukocytosis  - would treat with ertapenem (give benadryl prior per patient request)  - can d/c levaquin (hx R-levation, hx chronic dissection)  - develops fever or worsening clinical status, can give a dose of vancomycin pending MRSA PCR

## 2024-10-10 NOTE — CONSULT NOTE ADULT - SUBJECTIVE AND OBJECTIVE BOX
Patient  is a 74 year old female with a past medical history of ESBL proteus bacteremia secondary to aspiration pna in 12/2023, recurrent pna, hx of ESBL proteus mirabilis and MRSA in sputum cultures, adrenal insuffiency on chronic steroids, bronchiectasis, tracheomalacia status post tracheoplasty,  type a aortic dissection s/p repair, s/p TAVR in 2023 diabetes, A-fib, colorectal cancer status post total colectomy with colostomy, seizures, HTN, HLD who presents with shortness of breath, sore throat, and cough productive of yellow sputum for the past 2 days. Patient states her daughter noticed she had increased work of breathing so she brought her in to see her pulmonologist who referred her to the ED for further management. Patient states she was admitted to [facility] last week for chest pressure. On review of systems, patient reports nausea and constipation with her last bowel movement 2 days ago. Per patient, she denies any vision changes, chest pain, abdominal pain, vomiting, diarrhea, rashes, dysuria, hematuria, or blood in stool.     In the ER:   Vitals: Tmax 37.8 C, Hypoxia requiring 4 L NC  Labs: CBC with leukocytosis to 14.89, microcytic anemia. CMP with serum glucose 255.procal 0.11. RVP negative. Blood cultures x 2 collected and pending results.   A1C 7.9. Urine strpe/legionella antigens, MRSA PCR ordered and pending collection,  Imaging:   -CXR: Few small patchy airspace opacities in the right lower lung which may   reflect infectious process.  -CT chest: New ill-defined peribronchovascular groundglass nodular opacities throughout both lungs, likely infectious. Follow-up chest CT in 8-12 weeks to resolution.    Abx:   Levaquin 750 mg PO q24h (10/10-)   s/p Aztreonam x 1   Valacyclovir ppx            REVIEW OF SYSTEMS  pending full examination    prior hospital charts reviewed [V]  primary team notes reviewed [V]  other consultant notes reviewed [V]    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      H/O pelvic surgery  5 years ago - s/p fracture      History of tracheomalacia  2015 - attempted tracheal stenting ([facility])- course complicated by obstruction, respiratory failure, multiple CPR attempts -  stent discontinued; 10/20/2016 Tracheobronchoplasty (Prolene Mesh) performed at [facility] by Dr Zapien      S/P bronchoscopy  6/5/2018 - [facility] (Dr Zapien) no evidence of tracheobronchomalacia in trachea or bronchial tubes      Rectal bleeding  exam under anesthesia ([facility]) 2/2018      S/P TAVR (transcatheter aortic valve replacement)      S/P aortic valve replacement          SOCIAL HISTORY:  Denied smoking/vaping/alcohol/recreational drug use    FAMILY HISTORY:  Family history of asthma    Family history of breast cancer (Sibling)    Family history of diabetes mellitus type II        Allergies  aspirin (Short breath)  OxyContin (Unknown)  Dilaudid (Short breath)  Valium (Short breath)  tetanus toxoid (Short breath)  ampicillin (Unknown)  cefepime (Anaphylaxis)  Avelox (Short breath; Pruritus)  shellfish (Anaphylaxis)  penicillin (Anaphylaxis)  codeine (Short breath)  Percocet (Unknown)  iodine (Short breath; Swelling)  meropenem (Unknown)        ANTIMICROBIALS:  levoFLOXacin  Tablet 750 every 24 hours  valACYclovir 1000 two times a day      ANTIMICROBIALS (past 90 days):  MEDICATIONS  (STANDING):  aztreonam  IVPB   50 mL/Hr IV Intermittent (10-09-24 @ 10:49)    levoFLOXacin  Tablet   750 milliGRAM(s) Oral (10-10-24 @ 05:53)    valACYclovir   1000 milliGRAM(s) Oral (10-10-24 @ 05:53)        OTHER MEDS:   MEDICATIONS  (STANDING):  albuterol    90 MICROgram(s) HFA Inhaler 2 every 6 hours  apixaban 5 every 12 hours  atorvastatin 20 at bedtime  buDESOnide    Inhalation Suspension 0.5 two times a day  clopidogrel Tablet 75 daily  dextrose 50% Injectable 25 once  dextrose 50% Injectable 12.5 once  dextrose 50% Injectable 25 once  dextrose Oral Gel 15 once PRN  fluticasone propionate/ salmeterol 100-50 MICROgram(s) Diskus 1 two times a day  gabapentin 100 every 8 hours  glucagon  Injectable 1 once  insulin glargine Injectable (LANTUS) 6 at bedtime  insulin glargine Injectable (LANTUS) 24 every morning  insulin lispro (ADMELOG) corrective regimen sliding scale  three times a day before meals  insulin lispro (ADMELOG) corrective regimen sliding scale  at bedtime  levETIRAcetam 1000 two times a day  melatonin 3 at bedtime PRN  methylPREDNISolone 8 daily  mexiletine 200 every 8 hours  montelukast 10 daily  pantoprazole    Tablet 40 before breakfast  senna 2 at bedtime  tiotropium 2.5 MICROgram(s) Inhaler 2 daily      VITALS:  Vital Signs Last 24 Hrs  T(F): 98.5 (10-10-24 @ 01:18), Max: 100.1 (10-09-24 @ 08:29)    Vital Signs Last 24 Hrs  HR: 62 (10-10-24 @ 01:18) (62 - 68)  BP: 108/63 (10-10-24 @ 01:18) (100/68 - 126/65)  RR: 18 (10-10-24 @ 01:18)  SpO2: 92% (10-10-24 @ 01:18) (92% - 99%)  Wt(kg): --    EXAM:  pending full examination      Labs:                        10.1   14.89 )-----------( 238      ( 09 Oct 2024 10:07 )             33.8     10-09    136  |  101  |  15  ----------------------------<  255[H]  4.0   |  22  |  0.72    Ca    8.8      09 Oct 2024 10:07    TPro  6.5  /  Alb  3.4  /  TBili  0.4  /  DBili  x   /  AST  12  /  ALT  11  /  AlkPhos  61  10-09      WBC Trend:  WBC Count: 14.89 (10-09-24 @ 10:07)      Auto Neutrophil #: 13.48 K/uL (10-09-24 @ 10:07)  Band Neutrophils %: 6.1 % (10-09-24 @ 10:07)  Auto Neutrophil #: 10.13 K/uL (08-04-24 @ 17:09)  Auto Neutrophil #: 7.76 K/uL (08-02-24 @ 19:50)  Auto Neutrophil #: 8.14 K/uL (07-06-24 @ 00:20)      Creatine Trend:  Creatinine: 0.72 (10-09)      Liver Biochemical Testing Trend:  Alanine Aminotransferase (ALT/SGPT): 11 (10-09)  Alanine Aminotransferase (ALT/SGPT): 21 (08-04)  Alanine Aminotransferase (ALT/SGPT): 22 (08-02)  Alanine Aminotransferase (ALT/SGPT): 15 (07-06)  Alanine Aminotransferase (ALT/SGPT): 13 (07-05)  Aspartate Aminotransferase (AST/SGOT): 12 (10-09-24 @ 10:07)  Aspartate Aminotransferase (AST/SGOT): 30 (08-04-24 @ 17:09)  Aspartate Aminotransferase (AST/SGOT): 25 (08-02-24 @ 19:50)  Aspartate Aminotransferase (AST/SGOT): 10 (07-06-24 @ 00:20)  Aspartate Aminotransferase (AST/SGOT): 12 (07-05-24 @ 00:44)  Bilirubin Total: 0.4 (10-09)  Bilirubin Total: <0.2 (08-04)  Bilirubin Total: <0.2 (08-02)  Bilirubin Total: 0.1 (07-06)  Bilirubin Total: 0.2 (07-05)      Trend LDH  08-30-23 @ 07:06  2024[H]  08-29-23 @ 07:34  1896[H]  08-24-23 @ 12:04  1661[H]  08-16-23 @ 06:31  1382[H]  11-26-22 @ 08:13  214      Auto Eosinophil %: 0.0 % (10-09-24 @ 10:07)      MICROBIOLOGY:    MRSA PCR Result.: Detected (07-03-24 @ 16:38)      Culture - Sputum (collected 02 Aug 2024 22:23)  Source: .Sputum Sputum  Final Report:    Normal Respiratory Jessica present    Culture - Urine (collected 02 Aug 2024 19:50)  Source: Clean Catch Clean Catch (Midstream)  Final Report:    10,000 - 49,000 CFU/mL Proteus mirabilis ESBL  Organism: Proteus mirabilis ESBL  Organism: Proteus mirabilis ESBL    Sensitivities:      Method Type: GARRETT      -  Ampicillin: R >16 These ampicillin results predict results for amoxicillin      -  Ampicillin/Sulbactam: S <=4/2      -  Aztreonam: R 16      -  Cefazolin: R >16 For uncomplicated UTI with K. pneumoniae, E. coli, or P. mirablis: GARRETT <=16 is sensitive and GARRETT >=32 is resistant. This also predicts results for oral agents cefaclor, cefdinir, cefpodoxime, cefprozil, cefuroxime axetil, cephalexin and locarbef for uncomplicated UTI. Note that some isolates may be susceptible to these agents while testing resistant to cefazolin.      -  Cefepime: R >16      -  Ceftriaxone: R >32      -  Cefuroxime: R >16      -  Ciprofloxacin: R >2      -  Ertapenem: S <=0.5      -  Gentamicin: R >8      -  Levofloxacin: R >4      -  Meropenem: S <=1      -  Nitrofurantoin: R >64 Should not be used to treat pyelonephritis      -  Piperacillin/Tazobactam: S <=8      -  Tobramycin: R >8      -  Trimethoprim/Sulfamethoxazole: R >2/38    Culture - Blood (collected 26 Apr 2024 00:19)  Source: .Blood Blood-Peripheral  Final Report:    No growth at 5 days    Culture - Blood (collected 26 Apr 2024 00:13)  Source: .Blood Blood-Peripheral  Final Report:    No growth at 5 days    Culture - Urine (collected 26 Apr 2024 00:01)  Source: Clean Catch Clean Catch (Midstream)  Final Report:    <10,000 CFU/mL Normal Urogenital Jessica    Culture - Blood (collected 25 Dec 2023 08:13)  Source: .Blood Blood  Final Report:    No growth at 5 days    Culture - Blood (collected 25 Dec 2023 08:08)  Source: .Blood Blood  Final Report:    No growth at 5 days    Culture - Blood (collected 24 Dec 2023 11:12)  Source: .Blood Blood-Peripheral  Final Report:    No growth at 5 days    Culture - Blood (collected 24 Dec 2023 11:09)  Source: .Blood Blood-Peripheral  Final Report:    No growth at 5 days    Culture - Blood (collected 21 Dec 2023 12:00)  Source: .Blood Blood-Peripheral  Final Report:    Growth in anaerobic bottle: Proteus mirabilis ESBL    Direct identification is available within approximately 3-5    hours either by Blood Panel Multiplexed PCR or Direct    MALDI-TOF. Details: [email or web address]  Organism: Blood Culture PCR  Proteus mirabilis ESBL  Organism: Proteus mirabilis ESBL    Sensitivities:      Method Type: GARRETT      -  Ampicillin: R >16 These ampicillin results predict results for amoxicillin      -  Ampicillin/Sulbactam: R 8/4      -  Aztreonam: R >16      -  Cefazolin: R >16      -  Cefepime: R >16      -  Ceftriaxone: R >32      -  Ciprofloxacin: R >2      -  Ertapenem: S <=0.5      -  Gentamicin: R >8      -  Levofloxacin: R >4      -  Meropenem: S <=1      -  Piperacillin/Tazobactam: R <=8      -  Tobramycin: R >8      -  Trimethoprim/Sulfamethoxazole: R >2/38  Organism: Blood Culture PCR    Sensitivities:      Method Type: PCR      -  Proteus species: Detec      -  ESBL: Detec      -  CTX-M Resistance Marker: Detec          HIV-1 RNA Quantitative, Viral Load: NOT DET. copies/mL (10-04-22 @ 00:31)  HIV-1 Viral Load Result: NOT DET. (10-04-22 @ 00:31)                        Procalcitonin: 0.11 (10-09)              Troponin T, High Sensitivity Result: 41 (10-09)  Troponin T, High Sensitivity Result: 50 (10-09)    Blood Gas Venous - Lactate: 1.0 (10-09 @ 09:52)    A1C with Estimated Average Glucose Result: 7.9 % (10-09-24 @ 10:07)  A1C with Estimated Average Glucose Result: 7.8 % (07-03-24 @ 14:53)      RADIOLOGY:  imaging below personally reviewed   Patient  is a 74 year old female with a past medical history of ESBL proteus bacteremia secondary to aspiration pna in 12/2023, recurrent pna, hx of ESBL proteus mirabilis and MRSA in sputum cultures,  tracheomalacia status post tracheoplasty, COPD/bronchiectasis, asthma and adrenal insuffiency on chronic steroids (solumedrol 8 mg daily),  type a aortic dissection s/p repair, s/p TAVR in 2023 diabetes, A-fib, colorectal cancer status post total colectomy with colostomy, seizures, HTN, HLD who presents with progressively worsening shortness of breath, sore throat, and cough for the past 1 week. Patient states she has a cough at baseline productive of clear sputum but more recently has been productive of yellow sputum. No fevers, chills. Patient was in [facility] last week for chest pressure and states they did some tests and just discharged her. She states she was not treated with any abx. Chest pressure has resolved since. Denies any recent abx use. Denies any recent travel or sick contacts. Patient came to the hospital yesterday after daughter noted that patient was with increased work of breathing while sleeping two nights ago. She went to see her pulmonologist, Dr. Allison, yesterday who recommended that she come to the hospital for further evaluation.     Multiple abx allergies listed in the chart. Patient states she does not know her allergues.     In the ER:   Vitals: Tmax 37.8 C, Hypoxia requiring 4 L NC  Labs: CBC with leukocytosis to 14.89, microcytic anemia. CMP with serum glucose 255.procal 0.11. RVP negative. Blood cultures x 2 collected and pending results.   A1C 7.9. Urine strpe/legionella antigens, MRSA PCR ordered and pending collection,  Imaging:   -CXR: Few small patchy airspace opacities in the right lower lung which may   reflect infectious process.  -CT chest: New ill-defined peribronchovascular groundglass nodular opacities throughout both lungs, likely infectious. Follow-up chest CT in 8-12 weeks to resolution.    Abx:   Levaquin 750 mg PO q24h (10/10-)   s/p Aztreonam x 1   Valacyclovir ppx            REVIEW OF SYSTEMS  Constitutional: No fevers, No chills, No weight loss, No fatigue   ENMT: + sore throat, No ulcers  Respiratory: + cough, + SOB  Cardiovascular:  No chest pain, No palpitations   Gastrointestinal: No pain, + nausea, No vomiting, No diarrhea, No constipation	  Genitourinary: No dysuria, No frequency, No hesitancy, No flank pain  MSK: No Joint pain, No back pain, No edema  Neurological: No HA, no weakness    prior hospital charts reviewed [V]  primary team notes reviewed [V]  other consultant notes reviewed [V]    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)- course complicated by obstruction, respiratory failure, multiple CPR attempts -  stent discontinued; 10/20/2016 Tracheobronchoplasty (Prolene Mesh) performed at [facility] by Dr Zapien      S/P bronchoscopy  6/5/2018 - [facility] (Dr Zapien) no evidence of tracheobronchomalacia in trachea or bronchial tubes      Rectal bleeding  exam under anesthesia ([facility]) 2/2018      S/P TAVR (transcatheter aortic valve replacement)      S/P aortic valve replacement          SOCIAL HISTORY:  -born in [place], [facility] and moved to [place] as a young child  -lives with daughter at home with health aides who visit the home for 12 hours a day   -no pets at home   Denied smoking/vaping/alcohol/recreational drug use    FAMILY HISTORY:  Family history of asthma    Family history of breast cancer (Sibling)    Family history of diabetes mellitus type II        Allergies  aspirin (Short breath)  OxyContin (Unknown)  Dilaudid (Short breath)  Valium (Short breath)  tetanus toxoid (Short breath)  ampicillin (Unknown)  cefepime (Anaphylaxis)  Avelox (Short breath; Pruritus)  shellfish (Anaphylaxis)  penicillin (Anaphylaxis)  codeine (Short breath)  Percocet (Unknown)  iodine (Short breath; Swelling)  meropenem (Unknown)        ANTIMICROBIALS:  levoFLOXacin  Tablet 750 every 24 hours  valACYclovir 1000 two times a day      ANTIMICROBIALS (past 90 days):  MEDICATIONS  (STANDING):  aztreonam  IVPB   50 mL/Hr IV Intermittent (10-09-24 @ 10:49)    levoFLOXacin  Tablet   750 milliGRAM(s) Oral (10-10-24 @ 05:53)    valACYclovir   1000 milliGRAM(s) Oral (10-10-24 @ 05:53)        OTHER MEDS:   MEDICATIONS  (STANDING):  albuterol    90 MICROgram(s) HFA Inhaler 2 every 6 hours  apixaban 5 every 12 hours  atorvastatin 20 at bedtime  buDESOnide    Inhalation Suspension 0.5 two times a day  clopidogrel Tablet 75 daily  dextrose 50% Injectable 25 once  dextrose 50% Injectable 12.5 once  dextrose 50% Injectable 25 once  dextrose Oral Gel 15 once PRN  fluticasone propionate/ salmeterol 100-50 MICROgram(s) Diskus 1 two times a day  gabapentin 100 every 8 hours  glucagon  Injectable 1 once  insulin glargine Injectable (LANTUS) 6 at bedtime  insulin glargine Injectable (LANTUS) 24 every morning  insulin lispro (ADMELOG) corrective regimen sliding scale  three times a day before meals  insulin lispro (ADMELOG) corrective regimen sliding scale  at bedtime  levETIRAcetam 1000 two times a day  melatonin 3 at bedtime PRN  methylPREDNISolone 8 daily  mexiletine 200 every 8 hours  montelukast 10 daily  pantoprazole    Tablet 40 before breakfast  senna 2 at bedtime  tiotropium 2.5 MICROgram(s) Inhaler 2 daily      VITALS:  Vital Signs Last 24 Hrs  T(F): 98.5 (10-10-24 @ 01:18), Max: 100.1 (10-09-24 @ 08:29)    Vital Signs Last 24 Hrs  HR: 62 (10-10-24 @ 01:18) (62 - 68)  BP: 108/63 (10-10-24 @ 01:18) (100/68 - 126/65)  RR: 18 (10-10-24 @ 01:18)  SpO2: 92% (10-10-24 @ 01:18) (92% - 99%)  Wt(kg): --    EXAM:  General: Patient appears comfortable, no acute distress  HEENT: NCAT, PERRL, anicteric sclera, mucous membranes moist and intact  CV: +S1/S2, RRR, no M/R/G  Lungs: +diffuse expiratory wheezing throughout with scattered crackles   Abd:  +colostomy bag in place with brown stool. Soft, NTND, no guarding  : No suprapubic tenderness  Neuro: AAOx3. No focal deficits noted.   Ext: No cyanosis, no edema  Msk: freely moving upper and lower extremities  Skin: No rash, no phlebitis, No erythema       Labs:                        10.1   14.89 )-----------( 238      ( 09 Oct 2024 10:07 )             33.8     10-09    136  |  101  |  15  ----------------------------<  255[H]  4.0   |  22  |  0.72    Ca    8.8      09 Oct 2024 10:07    TPro  6.5  /  Alb  3.4  /  TBili  0.4  /  DBili  x   /  AST  12  /  ALT  11  /  AlkPhos  61  10-09      WBC Trend:  WBC Count: 14.89 (10-09-24 @ 10:07)      Auto Neutrophil #: 13.48 K/uL (10-09-24 @ 10:07)  Band Neutrophils %: 6.1 % (10-09-24 @ 10:07)  Auto Neutrophil #: 10.13 K/uL (08-04-24 @ 17:09)  Auto Neutrophil #: 7.76 K/uL (08-02-24 @ 19:50)  Auto Neutrophil #: 8.14 K/uL (07-06-24 @ 00:20)      Creatine Trend:  Creatinine: 0.72 (10-09)      Liver Biochemical Testing Trend:  Alanine Aminotransferase (ALT/SGPT): 11 (10-09)  Alanine Aminotransferase (ALT/SGPT): 21 (08-04)  Alanine Aminotransferase (ALT/SGPT): 22 (08-02)  Alanine Aminotransferase (ALT/SGPT): 15 (07-06)  Alanine Aminotransferase (ALT/SGPT): 13 (07-05)  Aspartate Aminotransferase (AST/SGOT): 12 (10-09-24 @ 10:07)  Aspartate Aminotransferase (AST/SGOT): 30 (08-04-24 @ 17:09)  Aspartate Aminotransferase (AST/SGOT): 25 (08-02-24 @ 19:50)  Aspartate Aminotransferase (AST/SGOT): 10 (07-06-24 @ 00:20)  Aspartate Aminotransferase (AST/SGOT): 12 (07-05-24 @ 00:44)  Bilirubin Total: 0.4 (10-09)  Bilirubin Total: <0.2 (08-04)  Bilirubin Total: <0.2 (08-02)  Bilirubin Total: 0.1 (07-06)  Bilirubin Total: 0.2 (07-05)      Trend LDH  08-30-23 @ 07:06  2024[H]  08-29-23 @ 07:34  1896[H]  08-24-23 @ 12:04  1661[H]  08-16-23 @ 06:31  1382[H]  11-26-22 @ 08:13  214      Auto Eosinophil %: 0.0 % (10-09-24 @ 10:07)      MICROBIOLOGY:    MRSA PCR Result.: Detected (07-03-24 @ 16:38)      Culture - Sputum (collected 02 Aug 2024 22:23)  Source: .Sputum Sputum  Final Report:    Normal Respiratory Jessica present    Culture - Urine (collected 02 Aug 2024 19:50)  Source: Clean Catch Clean Catch (Midstream)  Final Report:    10,000 - 49,000 CFU/mL Proteus mirabilis ESBL  Organism: Proteus mirabilis ESBL  Organism: Proteus mirabilis ESBL    Sensitivities:      Method Type: GARRETT      -  Ampicillin: R >16 These ampicillin results predict results for amoxicillin      -  Ampicillin/Sulbactam: S <=4/2      -  Aztreonam: R 16      -  Cefazolin: R >16 For uncomplicated UTI with K. pneumoniae, E. coli, or P. mirablis: GARRETT <=16 is sensitive and GARRETT >=32 is resistant. This also predicts results for oral agents cefaclor, cefdinir, cefpodoxime, cefprozil, cefuroxime axetil, cephalexin and locarbef for uncomplicated UTI. Note that some isolates may be susceptible to these agents while testing resistant to cefazolin.      -  Cefepime: R >16      -  Ceftriaxone: R >32      -  Cefuroxime: R >16      -  Ciprofloxacin: R >2      -  Ertapenem: S <=0.5      -  Gentamicin: R >8      -  Levofloxacin: R >4      -  Meropenem: S <=1      -  Nitrofurantoin: R >64 Should not be used to treat pyelonephritis      -  Piperacillin/Tazobactam: S <=8      -  Tobramycin: R >8      -  Trimethoprim/Sulfamethoxazole: R >2/38    Culture - Blood (collected 26 Apr 2024 00:19)  Source: .Blood Blood-Peripheral  Final Report:    No growth at 5 days    Culture - Blood (collected 26 Apr 2024 00:13)  Source: .Blood Blood-Peripheral  Final Report:    No growth at 5 days    Culture - Urine (collected 26 Apr 2024 00:01)  Source: Clean Catch Clean Catch (Midstream)  Final Report:    <10,000 CFU/mL Normal Urogenital Jessica    Culture - Blood (collected 25 Dec 2023 08:13)  Source: .Blood Blood  Final Report:    No growth at 5 days    Culture - Blood (collected 25 Dec 2023 08:08)  Source: .Blood Blood  Final Report:    No growth at 5 days    Culture - Blood (collected 24 Dec 2023 11:12)  Source: .Blood Blood-Peripheral  Final Report:    No growth at 5 days    Culture - Blood (collected 24 Dec 2023 11:09)  Source: .Blood Blood-Peripheral  Final Report:    No growth at 5 days    Culture - Blood (collected 21 Dec 2023 12:00)  Source: .Blood Blood-Peripheral  Final Report:    Growth in anaerobic bottle: Proteus mirabilis ESBL    Direct identification is available within approximately 3-5    hours either by Blood Panel Multiplexed PCR or Direct    MALDI-TOF. Details: [email or web address]  Organism: Blood Culture PCR  Proteus mirabilis ESBL  Organism: Proteus mirabilis ESBL    Sensitivities:      Method Type: GARRETT      -  Ampicillin: R >16 These ampicillin results predict results for amoxicillin      -  Ampicillin/Sulbactam: R 8/4      -  Aztreonam: R >16      -  Cefazolin: R >16      -  Cefepime: R >16      -  Ceftriaxone: R >32      -  Ciprofloxacin: R >2      -  Ertapenem: S <=0.5      -  Gentamicin: R >8      -  Levofloxacin: R >4      -  Meropenem: S <=1      -  Piperacillin/Tazobactam: R <=8      -  Tobramycin: R >8      -  Trimethoprim/Sulfamethoxazole: R >2/38  Organism: Blood Culture PCR    Sensitivities:      Method Type: PCR      -  Proteus species: Detec      -  ESBL: Detec      -  CTX-M Resistance Marker: Detec          HIV-1 RNA Quantitative, Viral Load: NOT DET. copies/mL (10-04-22 @ 00:31)  HIV-1 Viral Load Result: NOT DET. (10-04-22 @ 00:31)                        Procalcitonin: 0.11 (10-09)              Troponin T, High Sensitivity Result: 41 (10-09)  Troponin T, High Sensitivity Result: 50 (10-09)    Blood Gas Venous - Lactate: 1.0 (10-09 @ 09:52)    A1C with Estimated Average Glucose Result: 7.9 % (10-09-24 @ 10:07)  A1C with Estimated Average Glucose Result: 7.8 % (07-03-24 @ 14:53)      RADIOLOGY:  ACC: [id number] EXAM:  CT CHEST   ORDERED BY:  MEHRDAD LIU     PROCEDURE DATE:  10/09/2024          INTERPRETATION:  INDICATION: Evaluate for infection    TECHNIQUE: A volumetric CT acquisition of the chest was obtained from the   thoracic inlet to the upper abdomen without the use of intravenous   contrast. Coronal and sagittal reconstructed images are provided.    COMPARISON: Chest CTA 8/4/2024    FINDINGS:    Lungs/Airways/Pleura: Mild apical paraseptal emphysema. Trace left   pleural effusion. No pneumothorax. Tracheal mucous secretions.   Ill-defined nodular groundglass opacities are noted throughout both lungs   (predominantly the upper lobes since prior segment lower lobes) in a   peribronchovascular distribution, new from priorstudy. No cavitation.   Mild diffuse bronchial wall thickening. Stable scarring in the lateral   right middle and lower lobes    Mediastinum/Lymph nodes: New subcentimeter mediastinal nodes (i.e.   subaortic 301:30), likely reactive.    Heart and Vessels: Status post ascending aortic replacement and TAVR.   Known chronic thoracoabdominal dissection, as better evaluated on the   prior contrast-enhanced CTA. Coronary arterial and mitral annular   calcification. No pericardial effusion.    Upper Abdomen: Unremarkable.    Osseous structures and Soft Tissues: Sternotomy.    IMPRESSION:  New ill-defined peribronchovascular groundglass nodular opacities   throughout both lungs, likely infectious. Follow-up chest CT in 8-12   weeks to resolution.    --- End of Report ---            REFUGIO LEACH M.D., Attending Radiologist  This document has been electronically signed. Oct  9 2024  1:31PM   Patient  is a 74 year old female with a past medical history of ESBL proteus bacteremia secondary to aspiration pna in 12/2023, recurrent pna, hx of ESBL proteus mirabilis and MRSA in sputum cultures,  tracheomalacia status post tracheoplasty, COPD/bronchiectasis, asthma and adrenal insuffiency on chronic steroids (solumedrol 8 mg daily),  type a aortic dissection s/p repair, s/p TAVR in 2023 diabetes, A-fib, colorectal cancer status post total colectomy with colostomy, seizures, HTN, HLD who presents with progressively worsening shortness of breath, sore throat, and cough for the past 1 week. Patient states she has a cough at baseline productive of clear sputum but more recently has been productive of yellow sputum. No fevers, chills. Patient was in [facility] last week for chest pressure and states they did some tests and just discharged her. She states she was not treated with any abx. As per HIE, patient was treated for COPD exacerbation.  Chest pressure has resolved since. Denies any recent abx use. Denies any recent travel or sick contacts. Patient came to the hospital yesterday after daughter noted that patient was with increased work of breathing while sleeping two nights ago. She went to see her pulmonologist, Dr. Allison, yesterday who recommended that she come to the hospital for further evaluation.     Multiple abx allergies listed in the chart. Patient states she does not know her allergues.     In the ER:   Vitals: Tmax 37.8 C, Hypoxia requiring 4 L NC  Labs: CBC with leukocytosis to 14.89, microcytic anemia. CMP with serum glucose 255.procal 0.11. RVP negative. Blood cultures x 2 collected and pending results.   A1C 7.9. Urine strpe/legionella antigens, MRSA PCR ordered and pending collection,  Imaging:   -CXR: Few small patchy airspace opacities in the right lower lung which may   reflect infectious process.  -CT chest: New ill-defined peribronchovascular groundglass nodular opacities throughout both lungs, likely infectious. Follow-up chest CT in 8-12 weeks to resolution.    Abx:   Levaquin 750 mg PO q24h (10/10-)   s/p Aztreonam x 1   Valacyclovir ppx            REVIEW OF SYSTEMS  Constitutional: No fevers, No chills, No weight loss, No fatigue   ENMT: + sore throat, No ulcers  Respiratory: + cough, + SOB  Cardiovascular:  No chest pain, No palpitations   Gastrointestinal: No pain, + nausea, No vomiting, No diarrhea, No constipation	  Genitourinary: No dysuria, No frequency, No hesitancy, No flank pain  MSK: No Joint pain, No back pain, No edema  Neurological: No HA, no weakness    prior hospital charts reviewed [V]  primary team notes reviewed [V]  other consultant notes reviewed [V]    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      H/O pelvic surgery  5 years ago - s/p fracture      History of tracheomalacia  2015 - attempted tracheal stenting ([facility] course complicated by obstruction, respiratory failure, multiple CPR attempts -  stent discontinued; 10/20/2016 Tracheobronchoplasty (Prolene Mesh) performed at [facility] by Dr Zapien      S/P bronchoscopy  6/5/2018 - [facility] (Dr Zapien) no evidence of tracheobronchomalacia in trachea or bronchial tubes      Rectal bleeding  exam under anesthesia ([facility]) 2/2018      S/P TAVR (transcatheter aortic valve replacement)      S/P aortic valve replacement          SOCIAL HISTORY:  -born in [place], [facility] and moved to [place] as a young child  -lives with daughter at home with health aides who visit the home for 12 hours a day   -no pets at home   Denied smoking/vaping/alcohol/recreational drug use    FAMILY HISTORY:  Family history of asthma    Family history of breast cancer (Sibling)    Family history of diabetes mellitus type II        Allergies  aspirin (Short breath)  OxyContin (Unknown)  Dilaudid (Short breath)  Valium (Short breath)  tetanus toxoid (Short breath)  ampicillin (Unknown)  cefepime (Anaphylaxis)  Avelox (Short breath; Pruritus)  shellfish (Anaphylaxis)  penicillin (Anaphylaxis)  codeine (Short breath)  Percocet (Unknown)  iodine (Short breath; Swelling)  meropenem (Unknown)        ANTIMICROBIALS:  levoFLOXacin  Tablet 750 every 24 hours  valACYclovir 1000 two times a day      ANTIMICROBIALS (past 90 days):  MEDICATIONS  (STANDING):  aztreonam  IVPB   50 mL/Hr IV Intermittent (10-09-24 @ 10:49)    levoFLOXacin  Tablet   750 milliGRAM(s) Oral (10-10-24 @ 05:53)    valACYclovir   1000 milliGRAM(s) Oral (10-10-24 @ 05:53)        OTHER MEDS:   MEDICATIONS  (STANDING):  albuterol    90 MICROgram(s) HFA Inhaler 2 every 6 hours  apixaban 5 every 12 hours  atorvastatin 20 at bedtime  buDESOnide    Inhalation Suspension 0.5 two times a day  clopidogrel Tablet 75 daily  dextrose 50% Injectable 25 once  dextrose 50% Injectable 12.5 once  dextrose 50% Injectable 25 once  dextrose Oral Gel 15 once PRN  fluticasone propionate/ salmeterol 100-50 MICROgram(s) Diskus 1 two times a day  gabapentin 100 every 8 hours  glucagon  Injectable 1 once  insulin glargine Injectable (LANTUS) 6 at bedtime  insulin glargine Injectable (LANTUS) 24 every morning  insulin lispro (ADMELOG) corrective regimen sliding scale  three times a day before meals  insulin lispro (ADMELOG) corrective regimen sliding scale  at bedtime  levETIRAcetam 1000 two times a day  melatonin 3 at bedtime PRN  methylPREDNISolone 8 daily  mexiletine 200 every 8 hours  montelukast 10 daily  pantoprazole    Tablet 40 before breakfast  senna 2 at bedtime  tiotropium 2.5 MICROgram(s) Inhaler 2 daily      VITALS:  Vital Signs Last 24 Hrs  T(F): 98.5 (10-10-24 @ 01:18), Max: 100.1 (10-09-24 @ 08:29)    Vital Signs Last 24 Hrs  HR: 62 (10-10-24 @ 01:18) (62 - 68)  BP: 108/63 (10-10-24 @ 01:18) (100/68 - 126/65)  RR: 18 (10-10-24 @ 01:18)  SpO2: 92% (10-10-24 @ 01:18) (92% - 99%)  Wt(kg): --    EXAM:  General: Patient appears comfortable, no acute distress  HEENT: NCAT, PERRL, anicteric sclera, mucous membranes moist and intact  CV: +S1/S2, RRR, no M/R/G  Lungs: +diffuse expiratory wheezing throughout with scattered crackles   Abd:  +colostomy bag in place with brown stool. Soft, NTND, no guarding  : No suprapubic tenderness  Neuro: AAOx3. No focal deficits noted.   Ext: No cyanosis, no edema  Msk: freely moving upper and lower extremities  Skin: No rash, no phlebitis, No erythema       Labs:                        10.1   14.89 )-----------( 238      ( 09 Oct 2024 10:07 )             33.8     10-09    136  |  101  |  15  ----------------------------<  255[H]  4.0   |  22  |  0.72    Ca    8.8      09 Oct 2024 10:07    TPro  6.5  /  Alb  3.4  /  TBili  0.4  /  DBili  x   /  AST  12  /  ALT  11  /  AlkPhos  61  10-09      WBC Trend:  WBC Count: 14.89 (10-09-24 @ 10:07)      Auto Neutrophil #: 13.48 K/uL (10-09-24 @ 10:07)  Band Neutrophils %: 6.1 % (10-09-24 @ 10:07)  Auto Neutrophil #: 10.13 K/uL (08-04-24 @ 17:09)  Auto Neutrophil #: 7.76 K/uL (08-02-24 @ 19:50)  Auto Neutrophil #: 8.14 K/uL (07-06-24 @ 00:20)      Creatine Trend:  Creatinine: 0.72 (10-09)      Liver Biochemical Testing Trend:  Alanine Aminotransferase (ALT/SGPT): 11 (10-09)  Alanine Aminotransferase (ALT/SGPT): 21 (08-04)  Alanine Aminotransferase (ALT/SGPT): 22 (08-02)  Alanine Aminotransferase (ALT/SGPT): 15 (07-06)  Alanine Aminotransferase (ALT/SGPT): 13 (07-05)  Aspartate Aminotransferase (AST/SGOT): 12 (10-09-24 @ 10:07)  Aspartate Aminotransferase (AST/SGOT): 30 (08-04-24 @ 17:09)  Aspartate Aminotransferase (AST/SGOT): 25 (08-02-24 @ 19:50)  Aspartate Aminotransferase (AST/SGOT): 10 (07-06-24 @ 00:20)  Aspartate Aminotransferase (AST/SGOT): 12 (07-05-24 @ 00:44)  Bilirubin Total: 0.4 (10-09)  Bilirubin Total: <0.2 (08-04)  Bilirubin Total: <0.2 (08-02)  Bilirubin Total: 0.1 (07-06)  Bilirubin Total: 0.2 (07-05)      Trend LDH  08-30-23 @ 07:06  2024[H]  08-29-23 @ 07:34  1896[H]  08-24-23 @ 12:04  1661[H]  08-16-23 @ 06:31  1382[H]  11-26-22 @ 08:13  214      Auto Eosinophil %: 0.0 % (10-09-24 @ 10:07)      MICROBIOLOGY:    MRSA PCR Result.: Detected (07-03-24 @ 16:38)      Culture - Sputum (collected 02 Aug 2024 22:23)  Source: .Sputum Sputum  Final Report:    Normal Respiratory Jessica present    Culture - Urine (collected 02 Aug 2024 19:50)  Source: Clean Catch Clean Catch (Midstream)  Final Report:    10,000 - 49,000 CFU/mL Proteus mirabilis ESBL  Organism: Proteus mirabilis ESBL  Organism: Proteus mirabilis ESBL    Sensitivities:      Method Type: GARRETT      -  Ampicillin: R >16 These ampicillin results predict results for amoxicillin      -  Ampicillin/Sulbactam: S <=4/2      -  Aztreonam: R 16      -  Cefazolin: R >16 For uncomplicated UTI with K. pneumoniae, E. coli, or P. mirablis: GARRETT <=16 is sensitive and GARRETT >=32 is resistant. This also predicts results for oral agents cefaclor, cefdinir, cefpodoxime, cefprozil, cefuroxime axetil, cephalexin and locarbef for uncomplicated UTI. Note that some isolates may be susceptible to these agents while testing resistant to cefazolin.      -  Cefepime: R >16      -  Ceftriaxone: R >32      -  Cefuroxime: R >16      -  Ciprofloxacin: R >2      -  Ertapenem: S <=0.5      -  Gentamicin: R >8      -  Levofloxacin: R >4      -  Meropenem: S <=1      -  Nitrofurantoin: R >64 Should not be used to treat pyelonephritis      -  Piperacillin/Tazobactam: S <=8      -  Tobramycin: R >8      -  Trimethoprim/Sulfamethoxazole: R >2/38    Culture - Blood (collected 26 Apr 2024 00:19)  Source: .Blood Blood-Peripheral  Final Report:    No growth at 5 days    Culture - Blood (collected 26 Apr 2024 00:13)  Source: .Blood Blood-Peripheral  Final Report:    No growth at 5 days    Culture - Urine (collected 26 Apr 2024 00:01)  Source: Clean Catch Clean Catch (Midstream)  Final Report:    <10,000 CFU/mL Normal Urogenital Jessica    Culture - Blood (collected 25 Dec 2023 08:13)  Source: .Blood Blood  Final Report:    No growth at 5 days    Culture - Blood (collected 25 Dec 2023 08:08)  Source: .Blood Blood  Final Report:    No growth at 5 days    Culture - Blood (collected 24 Dec 2023 11:12)  Source: .Blood Blood-Peripheral  Final Report:    No growth at 5 days    Culture - Blood (collected 24 Dec 2023 11:09)  Source: .Blood Blood-Peripheral  Final Report:    No growth at 5 days    Culture - Blood (collected 21 Dec 2023 12:00)  Source: .Blood Blood-Peripheral  Final Report:    Growth in anaerobic bottle: Proteus mirabilis ESBL    Direct identification is available within approximately 3-5    hours either by Blood Panel Multiplexed PCR or Direct    MALDI-TOF. Details: [email or web address]  Organism: Blood Culture PCR  Proteus mirabilis ESBL  Organism: Proteus mirabilis ESBL    Sensitivities:      Method Type: GARRETT      -  Ampicillin: R >16 These ampicillin results predict results for amoxicillin      -  Ampicillin/Sulbactam: R 8/4      -  Aztreonam: R >16      -  Cefazolin: R >16      -  Cefepime: R >16      -  Ceftriaxone: R >32      -  Ciprofloxacin: R >2      -  Ertapenem: S <=0.5      -  Gentamicin: R >8      -  Levofloxacin: R >4      -  Meropenem: S <=1      -  Piperacillin/Tazobactam: R <=8      -  Tobramycin: R >8      -  Trimethoprim/Sulfamethoxazole: R >2/38  Organism: Blood Culture PCR    Sensitivities:      Method Type: PCR      -  Proteus species: Detec      -  ESBL: Detec      -  CTX-M Resistance Marker: Detec          HIV-1 RNA Quantitative, Viral Load: NOT DET. copies/mL (10-04-22 @ 00:31)  HIV-1 Viral Load Result: NOT DET. (10-04-22 @ 00:31)                        Procalcitonin: 0.11 (10-09)              Troponin T, High Sensitivity Result: 41 (10-09)  Troponin T, High Sensitivity Result: 50 (10-09)    Blood Gas Venous - Lactate: 1.0 (10-09 @ 09:52)    A1C with Estimated Average Glucose Result: 7.9 % (10-09-24 @ 10:07)  A1C with Estimated Average Glucose Result: 7.8 % (07-03-24 @ 14:53)      RADIOLOGY:  ACC: [id number] EXAM:  CT CHEST   ORDERED BY:  MEHRDAD LIU     PROCEDURE DATE:  10/09/2024          INTERPRETATION:  INDICATION: Evaluate for infection    TECHNIQUE: A volumetric CT acquisition of the chest was obtained from the   thoracic inlet to the upper abdomen without the use of intravenous   contrast. Coronal and sagittal reconstructed images are provided.    COMPARISON: Chest CTA 8/4/2024    FINDINGS:    Lungs/Airways/Pleura: Mild apical paraseptal emphysema. Trace left   pleural effusion. No pneumothorax. Tracheal mucous secretions.   Ill-defined nodular groundglass opacities are noted throughout both lungs   (predominantly the upper lobes since prior segment lower lobes) in a   peribronchovascular distribution, new from priorstudy. No cavitation.   Mild diffuse bronchial wall thickening. Stable scarring in the lateral   right middle and lower lobes    Mediastinum/Lymph nodes: New subcentimeter mediastinal nodes (i.e.   subaortic 301:30), likely reactive.    Heart and Vessels: Status post ascending aortic replacement and TAVR.   Known chronic thoracoabdominal dissection, as better evaluated on the   prior contrast-enhanced CTA. Coronary arterial and mitral annular   calcification. No pericardial effusion.    Upper Abdomen: Unremarkable.    Osseous structures and Soft Tissues: Sternotomy.    IMPRESSION:  New ill-defined peribronchovascular groundglass nodular opacities   throughout both lungs, likely infectious. Follow-up chest CT in 8-12   weeks to resolution.    --- End of Report ---            REFUGIO LEACH M.D., Attending Radiologist  This document has been electronically signed. Oct  9 2024  1:31PM   Patient  is a 74 year old female with a past medical history of ESBL proteus bacteremia secondary to aspiration pna in 12/2023, recurrent pna, hx of ESBL proteus mirabilis and MRSA in sputum cultures,  tracheomalacia status post tracheoplasty, COPD/bronchiectasis, asthma and adrenal insuffiency on chronic steroids (solumedrol 8 mg daily),  type a aortic dissection s/p repair, s/p TAVR in 2023 diabetes, A-fib, colorectal cancer status post total colectomy with colostomy, seizures, HTN, HLD who presents with progressively worsening shortness of breath, sore throat, and cough for the past 1 week. Patient states she has a cough at baseline productive of clear sputum but more recently has been productive of yellow sputum. No fevers, chills. Patient was in [facility] last week for chest pressure and states they did some tests and just discharged her. She states she was not treated with any abx. As per HIE, patient was treated for COPD exacerbation.  Chest pressure has resolved since. Denies any recent abx use. Denies any recent travel or sick contacts. Patient came to the hospital yesterday after daughter noted that patient was with increased work of breathing while sleeping two nights ago. She went to see her pulmonologist, Dr. Allison, yesterday who recommended that she come to the hospital for further evaluation.     Multiple abx allergies listed in the chart. Patient states she does not know her allergies.     In the ER:   Vitals: Tmax 37.8 C, Hypoxia requiring 4 L NC  Labs: CBC with leukocytosis to 14.89, microcytic anemia. CMP with serum glucose 255.procal 0.11. RVP negative. Blood cultures x 2 collected and pending results.   A1C 7.9. Urine strpe/legionella antigens, MRSA PCR ordered and pending collection,  Imaging:   -CXR: Few small patchy airspace opacities in the right lower lung which may   reflect infectious process.  -CT chest: New ill-defined peribronchovascular groundglass nodular opacities throughout both lungs, likely infectious. Follow-up chest CT in 8-12 weeks to resolution.    Abx:   Levaquin 750 mg PO q24h (10/10-)   s/p Aztreonam x 1   Valacyclovir ppx            REVIEW OF SYSTEMS  Constitutional: No fevers, No chills, No weight loss, No fatigue   ENMT: + sore throat, No ulcers  Respiratory: + cough, + SOB  Cardiovascular:  No chest pain, No palpitations   Gastrointestinal: No pain, + nausea, No vomiting, No diarrhea, No constipation	  Genitourinary: No dysuria, No frequency, No hesitancy, No flank pain  MSK: No Joint pain, No back pain, No edema  Neurological: No HA, no weakness    prior hospital charts reviewed [V]  primary team notes reviewed [V]  other consultant notes reviewed [V]    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      H/O pelvic surgery  5 years ago - s/p fracture      History of tracheomalacia  2015 - attempted tracheal stenting ([facility] course complicated by obstruction, respiratory failure, multiple CPR attempts -  stent discontinued; 10/20/2016 Tracheobronchoplasty (Prolene Mesh) performed at [facility] by Dr Zapien      S/P bronchoscopy  6/5/2018 - [facility] (Dr Zapien) no evidence of tracheobronchomalacia in trachea or bronchial tubes      Rectal bleeding  exam under anesthesia ([facility]) 2/2018      S/P TAVR (transcatheter aortic valve replacement)      S/P aortic valve replacement          SOCIAL HISTORY:  -born in [place], [facility] and moved to [place] as a young child  -lives with daughter at home with health aides who visit the home for 12 hours a day   -no pets at home   Denied smoking/vaping/alcohol/recreational drug use    FAMILY HISTORY:  Family history of asthma    Family history of breast cancer (Sibling)    Family history of diabetes mellitus type II        Allergies  aspirin (Short breath)  OxyContin (Unknown)  Dilaudid (Short breath)  Valium (Short breath)  tetanus toxoid (Short breath)  ampicillin (Unknown)  cefepime (Anaphylaxis)  Avelox (Short breath; Pruritus)  shellfish (Anaphylaxis)  penicillin (Anaphylaxis)  codeine (Short breath)  Percocet (Unknown)  iodine (Short breath; Swelling)  meropenem (Unknown)        ANTIMICROBIALS:  levoFLOXacin  Tablet 750 every 24 hours  valACYclovir 1000 two times a day      ANTIMICROBIALS (past 90 days):  MEDICATIONS  (STANDING):  aztreonam  IVPB   50 mL/Hr IV Intermittent (10-09-24 @ 10:49)    levoFLOXacin  Tablet   750 milliGRAM(s) Oral (10-10-24 @ 05:53)    valACYclovir   1000 milliGRAM(s) Oral (10-10-24 @ 05:53)        OTHER MEDS:   MEDICATIONS  (STANDING):  albuterol    90 MICROgram(s) HFA Inhaler 2 every 6 hours  apixaban 5 every 12 hours  atorvastatin 20 at bedtime  buDESOnide    Inhalation Suspension 0.5 two times a day  clopidogrel Tablet 75 daily  dextrose 50% Injectable 25 once  dextrose 50% Injectable 12.5 once  dextrose 50% Injectable 25 once  dextrose Oral Gel 15 once PRN  fluticasone propionate/ salmeterol 100-50 MICROgram(s) Diskus 1 two times a day  gabapentin 100 every 8 hours  glucagon  Injectable 1 once  insulin glargine Injectable (LANTUS) 6 at bedtime  insulin glargine Injectable (LANTUS) 24 every morning  insulin lispro (ADMELOG) corrective regimen sliding scale  three times a day before meals  insulin lispro (ADMELOG) corrective regimen sliding scale  at bedtime  levETIRAcetam 1000 two times a day  melatonin 3 at bedtime PRN  methylPREDNISolone 8 daily  mexiletine 200 every 8 hours  montelukast 10 daily  pantoprazole    Tablet 40 before breakfast  senna 2 at bedtime  tiotropium 2.5 MICROgram(s) Inhaler 2 daily      VITALS:  Vital Signs Last 24 Hrs  T(F): 98.5 (10-10-24 @ 01:18), Max: 100.1 (10-09-24 @ 08:29)    Vital Signs Last 24 Hrs  HR: 62 (10-10-24 @ 01:18) (62 - 68)  BP: 108/63 (10-10-24 @ 01:18) (100/68 - 126/65)  RR: 18 (10-10-24 @ 01:18)  SpO2: 92% (10-10-24 @ 01:18) (92% - 99%)  Wt(kg): --    EXAM:  General: Patient appears comfortable, no acute distress  HEENT: NCAT, PERRL, anicteric sclera, mucous membranes moist and intact  CV: +S1/S2, RRR, no M/R/G  Lungs: +diffuse expiratory wheezing throughout with scattered crackles   Abd:  +colostomy bag in place with brown stool. Soft, NTND, no guarding  : No suprapubic tenderness  Neuro: AAOx3. No focal deficits noted.   Ext: No cyanosis, no edema  Msk: freely moving upper and lower extremities  Skin: No rash, no phlebitis, No erythema       Labs:                        10.1   14.89 )-----------( 238      ( 09 Oct 2024 10:07 )             33.8     10-09    136  |  101  |  15  ----------------------------<  255[H]  4.0   |  22  |  0.72    Ca    8.8      09 Oct 2024 10:07    TPro  6.5  /  Alb  3.4  /  TBili  0.4  /  DBili  x   /  AST  12  /  ALT  11  /  AlkPhos  61  10-09      WBC Trend:  WBC Count: 14.89 (10-09-24 @ 10:07)      Auto Neutrophil #: 13.48 K/uL (10-09-24 @ 10:07)  Band Neutrophils %: 6.1 % (10-09-24 @ 10:07)  Auto Neutrophil #: 10.13 K/uL (08-04-24 @ 17:09)  Auto Neutrophil #: 7.76 K/uL (08-02-24 @ 19:50)  Auto Neutrophil #: 8.14 K/uL (07-06-24 @ 00:20)      Creatine Trend:  Creatinine: 0.72 (10-09)      Liver Biochemical Testing Trend:  Alanine Aminotransferase (ALT/SGPT): 11 (10-09)  Alanine Aminotransferase (ALT/SGPT): 21 (08-04)  Alanine Aminotransferase (ALT/SGPT): 22 (08-02)  Alanine Aminotransferase (ALT/SGPT): 15 (07-06)  Alanine Aminotransferase (ALT/SGPT): 13 (07-05)  Aspartate Aminotransferase (AST/SGOT): 12 (10-09-24 @ 10:07)  Aspartate Aminotransferase (AST/SGOT): 30 (08-04-24 @ 17:09)  Aspartate Aminotransferase (AST/SGOT): 25 (08-02-24 @ 19:50)  Aspartate Aminotransferase (AST/SGOT): 10 (07-06-24 @ 00:20)  Aspartate Aminotransferase (AST/SGOT): 12 (07-05-24 @ 00:44)  Bilirubin Total: 0.4 (10-09)  Bilirubin Total: <0.2 (08-04)  Bilirubin Total: <0.2 (08-02)  Bilirubin Total: 0.1 (07-06)  Bilirubin Total: 0.2 (07-05)      Trend LDH  08-30-23 @ 07:06  2024[H]  08-29-23 @ 07:34  1896[H]  08-24-23 @ 12:04  1661[H]  08-16-23 @ 06:31  1382[H]  11-26-22 @ 08:13  214      Auto Eosinophil %: 0.0 % (10-09-24 @ 10:07)      MICROBIOLOGY:    MRSA PCR Result.: Detected (07-03-24 @ 16:38)      Culture - Sputum (collected 02 Aug 2024 22:23)  Source: .Sputum Sputum  Final Report:    Normal Respiratory Jessica present    Culture - Urine (collected 02 Aug 2024 19:50)  Source: Clean Catch Clean Catch (Midstream)  Final Report:    10,000 - 49,000 CFU/mL Proteus mirabilis ESBL  Organism: Proteus mirabilis ESBL  Organism: Proteus mirabilis ESBL    Sensitivities:      Method Type: GARRETT      -  Ampicillin: R >16 These ampicillin results predict results for amoxicillin      -  Ampicillin/Sulbactam: S <=4/2      -  Aztreonam: R 16      -  Cefazolin: R >16 For uncomplicated UTI with K. pneumoniae, E. coli, or P. mirablis: GARRETT <=16 is sensitive and GARRETT >=32 is resistant. This also predicts results for oral agents cefaclor, cefdinir, cefpodoxime, cefprozil, cefuroxime axetil, cephalexin and locarbef for uncomplicated UTI. Note that some isolates may be susceptible to these agents while testing resistant to cefazolin.      -  Cefepime: R >16      -  Ceftriaxone: R >32      -  Cefuroxime: R >16      -  Ciprofloxacin: R >2      -  Ertapenem: S <=0.5      -  Gentamicin: R >8      -  Levofloxacin: R >4      -  Meropenem: S <=1      -  Nitrofurantoin: R >64 Should not be used to treat pyelonephritis      -  Piperacillin/Tazobactam: S <=8      -  Tobramycin: R >8      -  Trimethoprim/Sulfamethoxazole: R >2/38    Culture - Blood (collected 26 Apr 2024 00:19)  Source: .Blood Blood-Peripheral  Final Report:    No growth at 5 days    Culture - Blood (collected 26 Apr 2024 00:13)  Source: .Blood Blood-Peripheral  Final Report:    No growth at 5 days    Culture - Urine (collected 26 Apr 2024 00:01)  Source: Clean Catch Clean Catch (Midstream)  Final Report:    <10,000 CFU/mL Normal Urogenital Jessica    Culture - Blood (collected 25 Dec 2023 08:13)  Source: .Blood Blood  Final Report:    No growth at 5 days    Culture - Blood (collected 25 Dec 2023 08:08)  Source: .Blood Blood  Final Report:    No growth at 5 days    Culture - Blood (collected 24 Dec 2023 11:12)  Source: .Blood Blood-Peripheral  Final Report:    No growth at 5 days    Culture - Blood (collected 24 Dec 2023 11:09)  Source: .Blood Blood-Peripheral  Final Report:    No growth at 5 days    Culture - Blood (collected 21 Dec 2023 12:00)  Source: .Blood Blood-Peripheral  Final Report:    Growth in anaerobic bottle: Proteus mirabilis ESBL    Direct identification is available within approximately 3-5    hours either by Blood Panel Multiplexed PCR or Direct    MALDI-TOF. Details: [email or web address]  Organism: Blood Culture PCR  Proteus mirabilis ESBL  Organism: Proteus mirabilis ESBL    Sensitivities:      Method Type: GARRETT      -  Ampicillin: R >16 These ampicillin results predict results for amoxicillin      -  Ampicillin/Sulbactam: R 8/4      -  Aztreonam: R >16      -  Cefazolin: R >16      -  Cefepime: R >16      -  Ceftriaxone: R >32      -  Ciprofloxacin: R >2      -  Ertapenem: S <=0.5      -  Gentamicin: R >8      -  Levofloxacin: R >4      -  Meropenem: S <=1      -  Piperacillin/Tazobactam: R <=8      -  Tobramycin: R >8      -  Trimethoprim/Sulfamethoxazole: R >2/38  Organism: Blood Culture PCR    Sensitivities:      Method Type: PCR      -  Proteus species: Detec      -  ESBL: Detec      -  CTX-M Resistance Marker: Detec          HIV-1 RNA Quantitative, Viral Load: NOT DET. copies/mL (10-04-22 @ 00:31)  HIV-1 Viral Load Result: NOT DET. (10-04-22 @ 00:31)                        Procalcitonin: 0.11 (10-09)              Troponin T, High Sensitivity Result: 41 (10-09)  Troponin T, High Sensitivity Result: 50 (10-09)    Blood Gas Venous - Lactate: 1.0 (10-09 @ 09:52)    A1C with Estimated Average Glucose Result: 7.9 % (10-09-24 @ 10:07)  A1C with Estimated Average Glucose Result: 7.8 % (07-03-24 @ 14:53)      RADIOLOGY:  ACC: [id number] EXAM:  CT CHEST   ORDERED BY:  MEHRDAD LIU     PROCEDURE DATE:  10/09/2024          INTERPRETATION:  INDICATION: Evaluate for infection    TECHNIQUE: A volumetric CT acquisition of the chest was obtained from the   thoracic inlet to the upper abdomen without the use of intravenous   contrast. Coronal and sagittal reconstructed images are provided.    COMPARISON: Chest CTA 8/4/2024    FINDINGS:    Lungs/Airways/Pleura: Mild apical paraseptal emphysema. Trace left   pleural effusion. No pneumothorax. Tracheal mucous secretions.   Ill-defined nodular groundglass opacities are noted throughout both lungs   (predominantly the upper lobes since prior segment lower lobes) in a   peribronchovascular distribution, new from priorstudy. No cavitation.   Mild diffuse bronchial wall thickening. Stable scarring in the lateral   right middle and lower lobes    Mediastinum/Lymph nodes: New subcentimeter mediastinal nodes (i.e.   subaortic 301:30), likely reactive.    Heart and Vessels: Status post ascending aortic replacement and TAVR.   Known chronic thoracoabdominal dissection, as better evaluated on the   prior contrast-enhanced CTA. Coronary arterial and mitral annular   calcification. No pericardial effusion.    Upper Abdomen: Unremarkable.    Osseous structures and Soft Tissues: Sternotomy.    IMPRESSION:  New ill-defined peribronchovascular groundglass nodular opacities   throughout both lungs, likely infectious. Follow-up chest CT in 8-12   weeks to resolution.    --- End of Report ---            REFUGIO LEACH M.D., Attending Radiologist  This document has been electronically signed. Oct  9 2024  1:31PM   The patient is a 74y Female complaining of difficult breathing    Patient  is a 74 year old female with a past medical history of ESBL proteus bacteremia secondary to aspiration pna in 12/2023, recurrent pna, hx of ESBL proteus mirabilis and MRSA in sputum cultures,  tracheomalacia status post tracheoplasty, COPD/bronchiectasis, asthma and adrenal insuffiency on chronic steroids (solumedrol 8 mg daily),  type a aortic dissection s/p repair, s/p TAVR in 2023 diabetes, A-fib, colorectal cancer status post total colectomy with colostomy, seizures, HTN, HLD who presents with progressively worsening shortness of breath, sore throat, and cough for the past 1 week. Patient states she has a cough at baseline productive of clear sputum but more recently has been productive of yellow sputum. No fevers, chills. Patient was in [facility] last week for chest pressure and states they did some tests and just discharged her. She states she was not treated with any abx. As per HIE, patient was treated for COPD exacerbation.  Chest pressure has resolved since. Denies any recent abx use. Denies any recent travel or sick contacts. Patient came to the hospital yesterday after daughter noted that patient was with increased work of breathing while sleeping two nights ago. She went to see her pulmonologist, Dr. Allison, yesterday who recommended that she come to the hospital for further evaluation.     Multiple abx allergies listed in the chart. Patient states she does not know her allergies.     In the ER:   Vitals: Tmax 37.8 C, Hypoxia requiring 4 L NC  Labs: CBC with leukocytosis to 14.89, microcytic anemia. CMP with serum glucose 255.procal 0.11. RVP negative. Blood cultures x 2 collected and pending results.   A1C 7.9. Urine strpe/legionella antigens, MRSA PCR ordered and pending collection,  Imaging:   -CXR: Few small patchy airspace opacities in the right lower lung which may   reflect infectious process.  -CT chest: New ill-defined peribronchovascular groundglass nodular opacities throughout both lungs, likely infectious. Follow-up chest CT in 8-12 weeks to resolution.    Abx:   Levaquin 750 mg PO q24h (10/10-)   s/p Aztreonam x 1   Valacyclovir ppx     REVIEW OF SYSTEMS  Constitutional: No fevers, No chills, No weight loss, No fatigue   ENMT: + sore throat, No ulcers  Respiratory: + cough, + SOB  Cardiovascular:  No chest pain, No palpitations   Gastrointestinal: No pain, + nausea, No vomiting, No diarrhea, No constipation	  Genitourinary: No dysuria, No frequency, No hesitancy, No flank pain  MSK: No Joint pain, No back pain, No edema  Neurological: No HA, no weakness    prior hospital charts reviewed [V]  primary team notes reviewed [V]  other consultant notes reviewed [V]    PAST MEDICAL & SURGICAL HISTORY:  Seizure x 1 1/7/18  DVT  TIA multiple, last 5 years ago - presents as right-sided weakness  COPD (chronic obstructive pulmonary disease)  Atrial fibrillation  History of partial hysterectomy  H/O total knee replacement, bilateral  History of sinus surgery, multiple  Exostosis of orbit, 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  H/O pelvic surgery 5 years ago - s/p fracture  History of tracheomalacia 2015 - attempted tracheal stenting ([facility])- course complicated by obstruction, respiratory failure, multiple CPR attempts -  stent discontinued; 10/20/2016 Tracheobronchoplasty (Prolene Mesh) performed at [facility] by Dr Zapien  S/P bronchoscopy 6/5/2018 - [place] (Dr Zapien) no evidence of tracheobronchomalacia in trachea or bronchial tubes  Rectal bleeding   S/P TAVR (transcatheter aortic valve replacement)  S/P aortic valve replacement    SOCIAL HISTORY:  -born in [place], [facility] and moved to [place] as a young child  -lives with daughter at home with health aides who visit the home for 12 hours a day   -no pets at home   Denied smoking/vaping/alcohol/recreational drug use    FAMILY HISTORY:  Family history of asthma  Family history of breast cancer (Sibling)  Family history of diabetes mellitus type II    Allergies  aspirin (Short breath)  OxyContin (Unknown)  Dilaudid (Short breath)  Valium (Short breath)  tetanus toxoid (Short breath)  ampicillin (Unknown)  cefepime (Anaphylaxis)  Avelox (Short breath; Pruritus)  shellfish (Anaphylaxis)  penicillin (Anaphylaxis)  codeine (Short breath)  Percocet (Unknown)  iodine (Short breath; Swelling)  meropenem (Unknown)    ANTIMICROBIALS:  aztreonam  IVPB (10/9 x1)  levoFLOXacin  Tablet 750 every 24 hours (10/10-)  valACYclovir 1000 two times a day (10/10-)    MEDICATIONS  (STANDING):  albuterol    90 MICROgram(s) HFA Inhaler 2 every 6 hours  apixaban 5 every 12 hours  atorvastatin 20 at bedtime  buDESOnide    Inhalation Suspension 0.5 two times a day  clopidogrel Tablet 75 daily  fluticasone propionate/ salmeterol 100-50 MICROgram(s) Diskus 1 two times a day  gabapentin 100 every 8 hours  insulin glargine Injectable (LANTUS) 24 every morning  insulin glargine Injectable (LANTUS) 6 at bedtime  insulin lispro (ADMELOG) corrective regimen sliding scale  three times a day before meals  insulin lispro (ADMELOG) corrective regimen sliding scale  at bedtime  levETIRAcetam 1000 two times a day  methylPREDNISolone 8 daily  mexiletine 200 every 8 hours  montelukast 10 daily  pantoprazole    Tablet 40 before breakfast  senna 2 at bedtime  tiotropium 2.5 MICROgram(s) Inhaler 2 daily    Vital Signs Last 24 Hrs  T(F): 98.5 (10-10-24 @ 01:18), Max: 100.1 (10-09-24 @ 08:29)    Vital Signs Last 24 Hrs  HR: 62 (10-10-24 @ 01:18) (62 - 68)  BP: 108/63 (10-10-24 @ 01:18) (100/68 - 126/65)  RR: 18 (10-10-24 @ 01:18)  SpO2: 92% (10-10-24 @ 01:18) (92% - 99%)  Wt(kg): --      General: Patient appears comfortable, no acute distress  HEENT: NCAT, PERRL, anicteric sclera, mucous membranes moist and intact  CV: +S1/S2, RRR, no M/R/G  Lungs: +diffuse expiratory wheezing throughout with scattered crackles   Abd:  +colostomy bag in place with brown stool. Soft, NTND, no guarding  : No suprapubic tenderness  Neuro: AAOx3. No focal deficits noted.   Ext: No cyanosis, no edema  Msk: freely moving upper and lower extremities  Skin: No rash, no phlebitis, No erythema                                9.9    10.09 )-----------( 270      ( 10 Oct 2024 09:19 )             32.6 10-10    140  |  104  |  9   ----------------------------<  213  3.8   |  25  |  0.61  Ca    8.5      10 Oct 2024 09:19Phos  2.0     10-10Mg     2.0     10-10  TPro  6.4  /  Alb  3.4  /  TBili  0.3  /  DBili  x   /  AST  13  /  ALT  12  /  AlkPhos  56  10-10  WBC Count: 10.09 (10-10-24 @ 09:19)  WBC Count: 14.89 (10-09-24 @ 10:07)    MICROBIOLOGY:  MRSA PCR Result.: Detected (07-03-24 @ 16:38)    Culture - Sputum (collected 02 Aug 2024 22:23)  Source: .Sputum Sputum  Final Report:    Normal Respiratory Jessica present    Culture - Urine (collected 02 Aug 2024 19:50)  Source: Clean Catch Clean Catch (Midstream)  Final Report:    10,000 - 49,000 CFU/mL Proteus mirabilis ESBL  Organism: Proteus mirabilis ESBL  Organism: Proteus mirabilis ESBL    Sensitivities:      Method Type: GARRETT      -  Ampicillin: R >16 These ampicillin results predict results for amoxicillin      -  Ampicillin/Sulbactam: S <=4/2      -  Aztreonam: R 16      -  Cefazolin: R >16 For uncomplicated UTI with K. pneumoniae, E. coli, or P. mirablis: GARRETT <=16 is sensitive and GARRETT >=32 is resistant. This also predicts results for oral agents cefaclor, cefdinir, cefpodoxime, cefprozil, cefuroxime axetil, cephalexin and locarbef for uncomplicated UTI. Note that some isolates may be susceptible to these agents while testing resistant to cefazolin.      -  Cefepime: R >16      -  Ceftriaxone: R >32      -  Cefuroxime: R >16      -  Ciprofloxacin: R >2      -  Ertapenem: S <=0.5      -  Gentamicin: R >8      -  Levofloxacin: R >4      -  Meropenem: S <=1      -  Nitrofurantoin: R >64 Should not be used to treat pyelonephritis      -  Piperacillin/Tazobactam: S <=8      -  Tobramycin: R >8      -  Trimethoprim/Sulfamethoxazole: R >2/38    Culture - Blood (collected 26 Apr 2024 00:19)  Source: .Blood Blood-Peripheral  Final Report:    No growth at 5 days    Culture - Blood (collected 26 Apr 2024 00:13)  Source: .Blood Blood-Peripheral  Final Report:    No growth at 5 days    Culture - Urine (collected 26 Apr 2024 00:01)  Source: Clean Catch Clean Catch (Midstream)  Final Report:    <10,000 CFU/mL Normal Urogenital Jessica    Culture - Blood (collected 25 Dec 2023 08:13)  Source: .Blood Blood  Final Report:    No growth at 5 days    Culture - Blood (collected 25 Dec 2023 08:08)  Source: .Blood Blood  Final Report:    No growth at 5 days    Culture - Blood (collected 24 Dec 2023 11:12)  Source: .Blood Blood-Peripheral  Final Report:    No growth at 5 days    Culture - Blood (collected 24 Dec 2023 11:09)  Source: .Blood Blood-Peripheral  Final Report:    No growth at 5 days    Culture - Blood (collected 21 Dec 2023 12:00)  Source: .Blood Blood-Peripheral  Final Report:    Growth in anaerobic bottle: Proteus mirabilis ESBL    Direct identification is available within approximately 3-5    hours either by Blood Panel Multiplexed PCR or Direct    MALDI-TOF. Details: [email or web address]  Organism: Blood Culture PCR  Proteus mirabilis ESBL  Organism: Proteus mirabilis ESBL    Sensitivities:      Method Type: GARRETT      -  Ampicillin: R >16 These ampicillin results predict results for amoxicillin      -  Ampicillin/Sulbactam: R 8/4      -  Aztreonam: R >16      -  Cefazolin: R >16      -  Cefepime: R >16      -  Ceftriaxone: R >32      -  Ciprofloxacin: R >2      -  Ertapenem: S <=0.5      -  Gentamicin: R >8      -  Levofloxacin: R >4      -  Meropenem: S <=1      -  Piperacillin/Tazobactam: R <=8      -  Tobramycin: R >8      -  Trimethoprim/Sulfamethoxazole: R >2/38  Organism: Blood Culture PCR    Sensitivities:      Method Type: PCR      -  Proteus species: Detec      -  ESBL: Detec      -  CTX-M Resistance Marker: Detec    HIV-1 RNA Quantitative, Viral Load: NOT DET. copies/mL (10-04-22 @ 00:31)  HIV-1 Viral Load Result: NOT DET. (10-04-22 @ 00:31)    RADIOLOGY:  below radiology personally reviewed and agree with finding    CT Chest No Cont (10.09.24 @ 13:18) >  Lungs/Airways/Pleura: Mild apical paraseptal emphysema. Trace left pleural effusion. No pneumothorax. Tracheal mucous secretions. Ill-defined nodular groundglass opacities are noted throughout both lungs (predominantly the upper lobes since prior segment lower lobes) in a peribronchovascular distribution, new from priorstudy. No cavitation. Mild diffuse bronchial wall thickening. Stable scarring in the lateral right middle and lower lobes  Mediastinum/Lymph nodes: New subcentimeter mediastinal nodes (i.e. subaortic 301:30), likely reactive.  Heart and Vessels: Status post ascending aortic replacement and TAVR. Known chronic thoracoabdominal dissection, as better evaluated on the prior contrast-enhanced CTA. Coronary arterial and mitral annular calcification. No pericardial effusion.  IMPRESSION:  New ill-defined peribronchovascular groundglass nodular opacities throughout both lungs, likely infectious. Follow-up chest CT in 8-12 weeks to resolution.      ECHO  TTE W or WO Ultrasound Enhancing Agent (07.03.24 @ 15:45) >  CONCLUSIONS:   1. Left ventricular cavity is normal in size. Left ventricular wall thickness is normal. Left ventricular systolic function is hyperdynamic with an ejection fraction of 75 % by Felipe's method of disks.   2. Normal right ventricular cavity size, with normal wall thickness, and normal right ventricular systolic function. Tricuspid annular plane systolic excursion (TAPSE) is 1.6 cm (normal >=1.7 cm).   3. No pericardial effusion seen.   4. Compared to the transthoracic echocardiogram performed on 9/10/2023, there have been no significant interval changes.   5. There is increased LV mass and concentric hypertrophy.   The patient is a 74y Female complaining of difficult breathing    Patient  is a 74 year old female with a past medical history of ESBL proteus bacteremia secondary to aspiration pna in 12/2023, recurrent pna, hx of ESBL proteus mirabilis and MRSA in sputum cultures,  tracheomalacia status post tracheoplasty, COPD/bronchiectasis, asthma and adrenal insuffiency on chronic steroids (solumedrol 8 mg daily),  type a aortic dissection s/p repair, s/p TAVR in 2023 diabetes, A-fib, colorectal cancer status post total colectomy with colostomy, seizures, HTN, HLD who presents with progressively worsening shortness of breath, sore throat, and cough for the past 1 week. Patient states she has a cough at baseline productive of clear sputum but more recently has been productive of yellow sputum. No fevers, chills. Patient was in [facility] last week for chest pressure and states they did some tests and just discharged her. She states she was not treated with any abx. As per HIE, patient was treated for COPD exacerbation.  Chest pressure has resolved since. Denies any recent abx use. Denies any recent travel or sick contacts. Patient came to the hospital yesterday after daughter noted that patient was with increased work of breathing while sleeping two nights ago. She went to see her pulmonologist, Dr. Allison, yesterday who recommended that she come to the hospital for further evaluation.     Multiple abx allergies listed in the chart.  Clarified meropenem allergy. Patient states she has taken Ertapenem last year but reports she has itching whenever she takes abx so she was given Benadryl prior to administration of Ertapenem last year and tolerated.       In the ER:   Vitals: Tmax 37.8 C, Hypoxia requiring 4 L NC  Labs: CBC with leukocytosis to 14.89, microcytic anemia. CMP with serum glucose 255.procal 0.11. RVP negative. Blood cultures x 2 collected and pending results.   A1C 7.9. Urine strpe/legionella antigens, MRSA PCR ordered and pending collection,  Imaging:   -CXR: Few small patchy airspace opacities in the right lower lung which may   reflect infectious process.  -CT chest: New ill-defined peribronchovascular groundglass nodular opacities throughout both lungs, likely infectious. Follow-up chest CT in 8-12 weeks to resolution.    Abx:   Levaquin 750 mg PO q24h (10/10-)   s/p Aztreonam x 1   Valacyclovir ppx     REVIEW OF SYSTEMS  Constitutional: No fevers, No chills, No weight loss, No fatigue   ENMT: + sore throat, No ulcers  Respiratory: + cough, + SOB  Cardiovascular:  No chest pain, No palpitations   Gastrointestinal: No pain, + nausea, No vomiting, No diarrhea, No constipation	  Genitourinary: No dysuria, No frequency, No hesitancy, No flank pain  MSK: No Joint pain, No back pain, No edema  Neurological: No HA, no weakness    prior hospital charts reviewed [V]  primary team notes reviewed [V]  other consultant notes reviewed [V]    PAST MEDICAL & SURGICAL HISTORY:  Seizure x 1 1/7/18  DVT  TIA multiple, last 5 years ago - presents as right-sided weakness  COPD (chronic obstructive pulmonary disease)  Atrial fibrillation  History of partial hysterectomy  H/O total knee replacement, bilateral  History of sinus surgery, multiple  Exostosis of orbit, 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  H/O pelvic surgery 5 years ago - s/p fracture  History of tracheomalacia 2015 - attempted tracheal stenting ([facility])- course complicated by obstruction, respiratory failure, multiple CPR attempts -  stent discontinued; 10/20/2016 Tracheobronchoplasty (Prolene Mesh) performed at [facility] by Dr Zapien  S/P bronchoscopy 6/5/2018 - [place] [facility] (Dr Zapien) no evidence of tracheobronchomalacia in trachea or bronchial tubes  Rectal bleeding   S/P TAVR (transcatheter aortic valve replacement)  S/P aortic valve replacement    SOCIAL HISTORY:  -born in [place], [facility] and moved to [place] as a young child  -lives with daughter at home with health aides who visit the home for 12 hours a day   -no pets at home   Denied smoking/vaping/alcohol/recreational drug use    FAMILY HISTORY:  Family history of asthma  Family history of breast cancer (Sibling)  Family history of diabetes mellitus type II    Allergies  aspirin (Short breath)  OxyContin (Unknown)  Dilaudid (Short breath)  Valium (Short breath)  tetanus toxoid (Short breath)  ampicillin (Unknown)  cefepime (Anaphylaxis)  Avelox (Short breath; Pruritus)  shellfish (Anaphylaxis)  penicillin (Anaphylaxis)  codeine (Short breath)  Percocet (Unknown)  iodine (Short breath; Swelling)  meropenem (Unknown)    ANTIMICROBIALS:  aztreonam  IVPB (10/9 x1)  levoFLOXacin  Tablet 750 every 24 hours (10/10-)  valACYclovir 1000 two times a day (10/10-)    MEDICATIONS  (STANDING):  albuterol    90 MICROgram(s) HFA Inhaler 2 every 6 hours  apixaban 5 every 12 hours  atorvastatin 20 at bedtime  buDESOnide    Inhalation Suspension 0.5 two times a day  clopidogrel Tablet 75 daily  fluticasone propionate/ salmeterol 100-50 MICROgram(s) Diskus 1 two times a day  gabapentin 100 every 8 hours  insulin glargine Injectable (LANTUS) 24 every morning  insulin glargine Injectable (LANTUS) 6 at bedtime  insulin lispro (ADMELOG) corrective regimen sliding scale  three times a day before meals  insulin lispro (ADMELOG) corrective regimen sliding scale  at bedtime  levETIRAcetam 1000 two times a day  methylPREDNISolone 8 daily  mexiletine 200 every 8 hours  montelukast 10 daily  pantoprazole    Tablet 40 before breakfast  senna 2 at bedtime  tiotropium 2.5 MICROgram(s) Inhaler 2 daily    Vital Signs Last 24 Hrs  T(F): 98.5 (10-10-24 @ 01:18), Max: 100.1 (10-09-24 @ 08:29)    Vital Signs Last 24 Hrs  HR: 62 (10-10-24 @ 01:18) (62 - 68)  BP: 108/63 (10-10-24 @ 01:18) (100/68 - 126/65)  RR: 18 (10-10-24 @ 01:18)  SpO2: 92% (10-10-24 @ 01:18) (92% - 99%)  Wt(kg): --      General: Patient appears comfortable, no acute distress  HEENT: NCAT, PERRL, anicteric sclera, mucous membranes moist and intact  CV: +S1/S2, RRR, no M/R/G  Lungs: +diffuse expiratory wheezing throughout with scattered crackles   Abd:  +colostomy bag in place with brown stool. Soft, NTND, no guarding  : No suprapubic tenderness  Neuro: AAOx3. No focal deficits noted.   Ext: No cyanosis, no edema  Msk: freely moving upper and lower extremities  Skin: No rash, no phlebitis, No erythema                                9.9    10.09 )-----------( 270      ( 10 Oct 2024 09:19 )             32.6 10-10    140  |  104  |  9   ----------------------------<  213  3.8   |  25  |  0.61  Ca    8.5      10 Oct 2024 09:19Phos  2.0     10-10Mg     2.0     10-10  TPro  6.4  /  Alb  3.4  /  TBili  0.3  /  DBili  x   /  AST  13  /  ALT  12  /  AlkPhos  56  10-10  WBC Count: 10.09 (10-10-24 @ 09:19)  WBC Count: 14.89 (10-09-24 @ 10:07)    MICROBIOLOGY:  MRSA PCR Result.: Detected (07-03-24 @ 16:38)    Culture - Sputum (collected 02 Aug 2024 22:23)  Source: .Sputum Sputum  Final Report:    Normal Respiratory Jessica present    Culture - Urine (collected 02 Aug 2024 19:50)  Source: Clean Catch Clean Catch (Midstream)  Final Report:    10,000 - 49,000 CFU/mL Proteus mirabilis ESBL  Organism: Proteus mirabilis ESBL  Organism: Proteus mirabilis ESBL    Sensitivities:      Method Type: GARRETT      -  Ampicillin: R >16 These ampicillin results predict results for amoxicillin      -  Ampicillin/Sulbactam: S <=4/2      -  Aztreonam: R 16      -  Cefazolin: R >16 For uncomplicated UTI with K. pneumoniae, E. coli, or P. mirablis: GARRETT <=16 is sensitive and GARRETT >=32 is resistant. This also predicts results for oral agents cefaclor, cefdinir, cefpodoxime, cefprozil, cefuroxime axetil, cephalexin and locarbef for uncomplicated UTI. Note that some isolates may be susceptible to these agents while testing resistant to cefazolin.      -  Cefepime: R >16      -  Ceftriaxone: R >32      -  Cefuroxime: R >16      -  Ciprofloxacin: R >2      -  Ertapenem: S <=0.5      -  Gentamicin: R >8      -  Levofloxacin: R >4      -  Meropenem: S <=1      -  Nitrofurantoin: R >64 Should not be used to treat pyelonephritis      -  Piperacillin/Tazobactam: S <=8      -  Tobramycin: R >8      -  Trimethoprim/Sulfamethoxazole: R >2/38    Culture - Blood (collected 26 Apr 2024 00:19)  Source: .Blood Blood-Peripheral  Final Report:    No growth at 5 days    Culture - Blood (collected 26 Apr 2024 00:13)  Source: .Blood Blood-Peripheral  Final Report:    No growth at 5 days    Culture - Urine (collected 26 Apr 2024 00:01)  Source: Clean Catch Clean Catch (Midstream)  Final Report:    <10,000 CFU/mL Normal Urogenital Jessica    Culture - Blood (collected 25 Dec 2023 08:13)  Source: .Blood Blood  Final Report:    No growth at 5 days    Culture - Blood (collected 25 Dec 2023 08:08)  Source: .Blood Blood  Final Report:    No growth at 5 days    Culture - Blood (collected 24 Dec 2023 11:12)  Source: .Blood Blood-Peripheral  Final Report:    No growth at 5 days    Culture - Blood (collected 24 Dec 2023 11:09)  Source: .Blood Blood-Peripheral  Final Report:    No growth at 5 days    Culture - Blood (collected 21 Dec 2023 12:00)  Source: .Blood Blood-Peripheral  Final Report:    Growth in anaerobic bottle: Proteus mirabilis ESBL    Direct identification is available within approximately 3-5    hours either by Blood Panel Multiplexed PCR or Direct    MALDI-TOF. Details: [email or web address]  Organism: Blood Culture PCR  Proteus mirabilis ESBL  Organism: Proteus mirabilis ESBL    Sensitivities:      Method Type: GARRETT      -  Ampicillin: R >16 These ampicillin results predict results for amoxicillin      -  Ampicillin/Sulbactam: R 8/4      -  Aztreonam: R >16      -  Cefazolin: R >16      -  Cefepime: R >16      -  Ceftriaxone: R >32      -  Ciprofloxacin: R >2      -  Ertapenem: S <=0.5      -  Gentamicin: R >8      -  Levofloxacin: R >4      -  Meropenem: S <=1      -  Piperacillin/Tazobactam: R <=8      -  Tobramycin: R >8      -  Trimethoprim/Sulfamethoxazole: R >2/38  Organism: Blood Culture PCR    Sensitivities:      Method Type: PCR      -  Proteus species: Detec      -  ESBL: Detec      -  CTX-M Resistance Marker: Detec    HIV-1 RNA Quantitative, Viral Load: NOT DET. copies/mL (10-04-22 @ 00:31)  HIV-1 Viral Load Result: NOT DET. (10-04-22 @ 00:31)    RADIOLOGY:  below radiology personally reviewed and agree with finding    CT Chest No Cont (10.09.24 @ 13:18) >  Lungs/Airways/Pleura: Mild apical paraseptal emphysema. Trace left pleural effusion. No pneumothorax. Tracheal mucous secretions. Ill-defined nodular groundglass opacities are noted throughout both lungs (predominantly the upper lobes since prior segment lower lobes) in a peribronchovascular distribution, new from priorstudy. No cavitation. Mild diffuse bronchial wall thickening. Stable scarring in the lateral right middle and lower lobes  Mediastinum/Lymph nodes: New subcentimeter mediastinal nodes (i.e. subaortic 301:30), likely reactive.  Heart and Vessels: Status post ascending aortic replacement and TAVR. Known chronic thoracoabdominal dissection, as better evaluated on the prior contrast-enhanced CTA. Coronary arterial and mitral annular calcification. No pericardial effusion.  IMPRESSION:  New ill-defined peribronchovascular groundglass nodular opacities throughout both lungs, likely infectious. Follow-up chest CT in 8-12 weeks to resolution.      ECHO  TTE W or WO Ultrasound Enhancing Agent (07.03.24 @ 15:45) >  CONCLUSIONS:   1. Left ventricular cavity is normal in size. Left ventricular wall thickness is normal. Left ventricular systolic function is hyperdynamic with an ejection fraction of 75 % by Felipe's method of disks.   2. Normal right ventricular cavity size, with normal wall thickness, and normal right ventricular systolic function. Tricuspid annular plane systolic excursion (TAPSE) is 1.6 cm (normal >=1.7 cm).   3. No pericardial effusion seen.   4. Compared to the transthoracic echocardiogram performed on 9/10/2023, there have been no significant interval changes.   5. There is increased LV mass and concentric hypertrophy.   The patient is a 74y Female complaining of difficult breathing    Patient  is a 74 year old female with a past medical history of ESBL proteus bacteremia secondary to aspiration pna in 12/2023, recurrent pna, hx of ESBL proteus mirabilis and MRSA in sputum cultures,  tracheomalacia status post tracheoplasty, COPD/bronchiectasis, asthma and adrenal insuffiency on chronic steroids (solumedrol 8 mg daily),  type a aortic dissection s/p repair, s/p TAVR in 2023 diabetes, A-fib, colorectal cancer status post total colectomy with colostomy, seizures, HTN, HLD who presents with progressively worsening shortness of breath, sore throat, and cough for the past 1 week. Patient states she has a cough at baseline productive of clear sputum but more recently has been productive of yellow sputum. No fevers, chills. Patient was in [facility] last week for chest pressure and states they did some tests and just discharged her. She states she was not treated with any abx. As per HIE, patient was treated for COPD exacerbation.  Chest pressure has resolved since. Denies any recent abx use. Denies any recent travel or sick contacts. Patient came to the hospital yesterday after daughter noted that patient was with increased work of breathing while sleeping two nights ago. She went to see her pulmonologist, Dr. Allison, yesterday who recommended that she come to the hospital for further evaluation.     Multiple abx allergies listed in the chart.  Clarified meropenem allergy. Patient states she has taken Ertapenem last year but reports she has itching whenever she takes abx so she was given Benadryl prior to administration of Ertapenem last year and tolerated.     In the ER:   Vitals: Tmax 37.8 C, Hypoxia requiring 4 L NC  Labs: CBC with leukocytosis to 14.89, microcytic anemia. CMP with serum glucose 255.procal 0.11. RVP negative. Blood cultures x 2 collected and pending results.   A1C 7.9. Urine strpe/legionella antigens, MRSA PCR ordered and pending collection,  Imaging:   -CXR: Few small patchy airspace opacities in the right lower lung which may   reflect infectious process.  -CT chest: New ill-defined peribronchovascular groundglass nodular opacities throughout both lungs, likely infectious. Follow-up chest CT in 8-12 weeks to resolution.    Abx:   Levaquin 750 mg PO q24h (10/10-)   s/p Aztreonam x 1   Valacyclovir ppx     REVIEW OF SYSTEMS  Constitutional: No fevers, No chills, No weight loss, No fatigue   ENMT: + sore throat, No ulcers  Respiratory: + cough, + SOB  Cardiovascular:  No chest pain, No palpitations   Gastrointestinal: No pain, + nausea, No vomiting, No diarrhea, No constipation	  Genitourinary: No dysuria, No frequency, No hesitancy, No flank pain  MSK: No Joint pain, No back pain, No edema  Neurological: No HA, no weakness    prior hospital charts reviewed [V]  primary team notes reviewed [V]  other consultant notes reviewed [V]    PAST MEDICAL & SURGICAL HISTORY:  Seizure x 1 1/7/18  DVT  TIA multiple, last 5 years ago - presents as right-sided weakness  COPD (chronic obstructive pulmonary disease)  Atrial fibrillation  History of partial hysterectomy  H/O total knee replacement, bilateral  History of sinus surgery, multiple  Exostosis of orbit, 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 by Dr Zapien  S/P bronchoscopy 6/5/2018 - [facility] (Dr Zapien) no evidence of tracheobronchomalacia in trachea or bronchial tubes  Rectal bleeding   S/P TAVR (transcatheter aortic valve replacement)  S/P aortic valve replacement    SOCIAL HISTORY:  -born in [place], [facility] and moved to [place] as a young child  -lives with daughter at home with health aides who visit the home for 12 hours a day   -no pets at home   Denied smoking/vaping/alcohol/recreational drug use    FAMILY HISTORY:  Family history of asthma  Family history of breast cancer (Sibling)  Family history of diabetes mellitus type II    Allergies  aspirin (Short breath)  OxyContin (Unknown)  Dilaudid (Short breath)  Valium (Short breath)  tetanus toxoid (Short breath)  ampicillin (Unknown)  cefepime (Anaphylaxis)  Avelox (Short breath; Pruritus)  shellfish (Anaphylaxis)  penicillin (Anaphylaxis)  codeine (Short breath)  Percocet (Unknown)  iodine (Short breath; Swelling)  meropenem (patient is not allergic, please remove from allergies)    ANTIMICROBIALS:  aztreonam  IVPB (10/9 x1)  levoFLOXacin  Tablet 750 every 24 hours (10/10-)  valACYclovir 1000 two times a day (10/10-)    MEDICATIONS  (STANDING):  albuterol    90 MICROgram(s) HFA Inhaler 2 every 6 hours  apixaban 5 every 12 hours  atorvastatin 20 at bedtime  buDESOnide    Inhalation Suspension 0.5 two times a day  clopidogrel Tablet 75 daily  fluticasone propionate/ salmeterol 100-50 MICROgram(s) Diskus 1 two times a day  gabapentin 100 every 8 hours  insulin glargine Injectable (LANTUS) 24 every morning  insulin glargine Injectable (LANTUS) 6 at bedtime  insulin lispro (ADMELOG) corrective regimen sliding scale  three times a day before meals  insulin lispro (ADMELOG) corrective regimen sliding scale  at bedtime  levETIRAcetam 1000 two times a day  methylPREDNISolone 8 daily  mexiletine 200 every 8 hours  montelukast 10 daily  pantoprazole    Tablet 40 before breakfast  senna 2 at bedtime  tiotropium 2.5 MICROgram(s) Inhaler 2 daily    Vital Signs Last 24 Hrs  T(F): 98.5 (10-10-24 @ 01:18), Max: 100.1 (10-09-24 @ 08:29)    Vital Signs Last 24 Hrs  HR: 62 (10-10-24 @ 01:18) (62 - 68)  BP: 108/63 (10-10-24 @ 01:18) (100/68 - 126/65)  RR: 18 (10-10-24 @ 01:18)  SpO2: 92% (10-10-24 @ 01:18) (92% - 99%)  Wt(kg): --    General: Patient appears comfortable, no acute distress  HEENT: NCAT, PERRL, anicteric sclera, mucous membranes moist and intact  CV: +S1/S2, RRR, no M/R/G  Lungs: +diffuse expiratory wheezing throughout with scattered crackles   Abd:  +colostomy bag in place with brown stool. Soft, NTND, no guarding  : No suprapubic tenderness  Neuro: AAOx3. No focal deficits noted.   Ext: No cyanosis, no edema  Msk: freely moving upper and lower extremities  Skin: No rash, no phlebitis, No erythema                                9.9    10.09 )-----------( 270      ( 10 Oct 2024 09:19 )             32.6 10-10    140  |  104  |  9   ----------------------------<  213  3.8   |  25  |  0.61  Ca    8.5      10 Oct 2024 09:19Phos  2.0     10-10Mg     2.0     10-10  TPro  6.4  /  Alb  3.4  /  TBili  0.3  /  DBili  x   /  AST  13  /  ALT  12  /  AlkPhos  56  10-10  WBC Count: 10.09 (10-10-24 @ 09:19)  WBC Count: 14.89 (10-09-24 @ 10:07)    MICROBIOLOGY:  MRSA PCR Result.: Detected (07-03-24 @ 16:38)    Culture - Sputum (collected 02 Aug 2024 22:23)  Source: .Sputum Sputum  Final Report:    Normal Respiratory Jessica present    Culture - Urine (collected 02 Aug 2024 19:50)  Source: Clean Catch Clean Catch (Midstream)  Final Report:    10,000 - 49,000 CFU/mL Proteus mirabilis ESBL  Organism: Proteus mirabilis ESBL  Organism: Proteus mirabilis ESBL    Sensitivities:      Method Type: GARRETT      -  Ampicillin: R >16 These ampicillin results predict results for amoxicillin      -  Ampicillin/Sulbactam: S <=4/2      -  Aztreonam: R 16      -  Cefazolin: R >16 For uncomplicated UTI with K. pneumoniae, E. coli, or P. mirablis: GARRETT <=16 is sensitive and GARRETT >=32 is resistant. This also predicts results for oral agents cefaclor, cefdinir, cefpodoxime, cefprozil, cefuroxime axetil, cephalexin and locarbef for uncomplicated UTI. Note that some isolates may be susceptible to these agents while testing resistant to cefazolin.      -  Cefepime: R >16      -  Ceftriaxone: R >32      -  Cefuroxime: R >16      -  Ciprofloxacin: R >2      -  Ertapenem: S <=0.5      -  Gentamicin: R >8      -  Levofloxacin: R >4      -  Meropenem: S <=1      -  Nitrofurantoin: R >64 Should not be used to treat pyelonephritis      -  Piperacillin/Tazobactam: S <=8      -  Tobramycin: R >8      -  Trimethoprim/Sulfamethoxazole: R >2/38    Culture - Blood (collected 26 Apr 2024 00:19)  Source: .Blood Blood-Peripheral  Final Report:    No growth at 5 days    Culture - Blood (collected 26 Apr 2024 00:13)  Source: .Blood Blood-Peripheral  Final Report:    No growth at 5 days    Culture - Urine (collected 26 Apr 2024 00:01)  Source: Clean Catch Clean Catch (Midstream)  Final Report:    <10,000 CFU/mL Normal Urogenital Jessica    Culture - Blood (collected 25 Dec 2023 08:13)  Source: .Blood Blood  Final Report:    No growth at 5 days    Culture - Blood (collected 25 Dec 2023 08:08)  Source: .Blood Blood  Final Report:    No growth at 5 days    Culture - Blood (collected 24 Dec 2023 11:12)  Source: .Blood Blood-Peripheral  Final Report:    No growth at 5 days    Culture - Blood (collected 24 Dec 2023 11:09)  Source: .Blood Blood-Peripheral  Final Report:    No growth at 5 days    Culture - Blood (collected 21 Dec 2023 12:00)  Source: .Blood Blood-Peripheral  Final Report:    Growth in anaerobic bottle: Proteus mirabilis ESBL    Direct identification is available within approximately 3-5    hours either by Blood Panel Multiplexed PCR or Direct    MALDI-TOF. Details: [email or web address]  Organism: Blood Culture PCR  Proteus mirabilis ESBL  Organism: Proteus mirabilis ESBL    Sensitivities:      Method Type: GARRETT      -  Ampicillin: R >16 These ampicillin results predict results for amoxicillin      -  Ampicillin/Sulbactam: R 8/4      -  Aztreonam: R >16      -  Cefazolin: R >16      -  Cefepime: R >16      -  Ceftriaxone: R >32      -  Ciprofloxacin: R >2      -  Ertapenem: S <=0.5      -  Gentamicin: R >8      -  Levofloxacin: R >4      -  Meropenem: S <=1      -  Piperacillin/Tazobactam: R <=8      -  Tobramycin: R >8      -  Trimethoprim/Sulfamethoxazole: R >2/38  Organism: Blood Culture PCR    Sensitivities:      Method Type: PCR      -  Proteus species: Detec      -  ESBL: Detec      -  CTX-M Resistance Marker: Detec    HIV-1 RNA Quantitative, Viral Load: NOT DET. copies/mL (10-04-22 @ 00:31)  HIV-1 Viral Load Result: NOT DET. (10-04-22 @ 00:31)    RADIOLOGY:  below radiology personally reviewed and agree with finding    CT Chest No Cont (10.09.24 @ 13:18) >  Lungs/Airways/Pleura: Mild apical paraseptal emphysema. Trace left pleural effusion. No pneumothorax. Tracheal mucous secretions. Ill-defined nodular groundglass opacities are noted throughout both lungs (predominantly the upper lobes since prior segment lower lobes) in a peribronchovascular distribution, new from priorstudy. No cavitation. Mild diffuse bronchial wall thickening. Stable scarring in the lateral right middle and lower lobes  Mediastinum/Lymph nodes: New subcentimeter mediastinal nodes (i.e. subaortic 301:30), likely reactive.  Heart and Vessels: Status post ascending aortic replacement and TAVR. Known chronic thoracoabdominal dissection, as better evaluated on the prior contrast-enhanced CTA. Coronary arterial and mitral annular calcification. No pericardial effusion.  IMPRESSION:  New ill-defined peribronchovascular groundglass nodular opacities throughout both lungs, likely infectious. Follow-up chest CT in 8-12 weeks to resolution.      ECHO  TTE W or WO Ultrasound Enhancing Agent (07.03.24 @ 15:45) >  CONCLUSIONS:   1. Left ventricular cavity is normal in size. Left ventricular wall thickness is normal. Left ventricular systolic function is hyperdynamic with an ejection fraction of 75 % by Felipe's method of disks.   2. Normal right ventricular cavity size, with normal wall thickness, and normal right ventricular systolic function. Tricuspid annular plane systolic excursion (TAPSE) is 1.6 cm (normal >=1.7 cm).   3. No pericardial effusion seen.   4. Compared to the transthoracic echocardiogram performed on 9/10/2023, there have been no significant interval changes.   5. There is increased LV mass and concentric hypertrophy.

## 2024-10-10 NOTE — ADVANCED PRACTICE NURSE CONSULT - RECOMMEDATIONS
Will recommend:  1. Encourage self care -participation w/ ostomy care.  2. Empty or dispose of pouch when 1/3-1/2 full   3. Change pouching system every 3-4 days & prn leakage  4. Contact ostomy specialists if questions, concerns/issues .  5. Supplies: [place] 1 3/4" Ceraplus flat skin barrier (#75539), [place] 1 3/4" drainable pouch (#[id number] or disposable pouch #[id number] ); Accessory products:  stoma paste #[id number], stoma powder (#9317) & Cavilon No sting barrier film wipe (#3982)

## 2024-10-10 NOTE — PROGRESS NOTE ADULT - ATTENDING COMMENTS
#sepsis secondary to pneumonia-- improving  #hx of severe asthma  #hx of aortic dissection, s/p repair  #hx of TAVR    - appreciate ID input. Now recommended for ertapenem.   - continue methylprednisolone 8mg daily  - f/u cultures  - pulm consulted, will f/u recommendations  - continue eliquis and aspirin   - continue mexilitine   - trend BPs    rest of plan as above.

## 2024-10-10 NOTE — CONSULT NOTE ADULT - ASSESSMENT
Patient  is a 74 year old female with a past medical history of ESBL proteus bacteremia secondary to aspiration pna in 12/2023, recurrent pna, hx of ESBL proteus mirabilis and MRSA in sputum cultures,  tracheomalacia status post tracheoplasty, COPD/bronchiectasis, asthma and adrenal insuffiency on chronic steroids (solumedrol 8 mg daily),  type a aortic dissection s/p repair, s/p TAVR in 2023 diabetes, A-fib, colorectal cancer status post total colectomy with colostomy, seizures, HTN, HLD who presents with progressively worsening shortness of breath, sore throat, and cough for the past 1 week. Patient with intermittent requirement for oxygen. No fevers since admission. Infectious workup thus far notable for leukocytosis, procal 0.11, negative RVP, and CT chets imaging showing New ill-defined peribronchovascular groundglass nodular opacities throughout both lungs. Exam notable for diffuse expiratory wheezing throughout. Of note, patient with multiple abx allergies.     #COPD exacerbation   #pneumonia   -f/u urine strep/legionella antigens   -f/u MRSA PCR   -f/u all culture data.    Patient  is a 74 year old female with a past medical history of ESBL proteus bacteremia secondary to aspiration pna in 12/2023, recurrent pna, hx of ESBL proteus mirabilis and MRSA in sputum cultures,  tracheomalacia status post tracheoplasty, COPD/bronchiectasis, asthma and adrenal insuffiency on chronic steroids (solumedrol 8 mg daily),  type a aortic dissection s/p repair, s/p TAVR in 2023 diabetes, A-fib, colorectal cancer status post total colectomy with colostomy, seizures, HTN, HLD who presents with progressively worsening shortness of breath, sore throat, and cough for the past 1 week. Patient with intermittent requirement for oxygen. No fevers since admission. Infectious workup thus far notable for leukocytosis, procal 0.11, negative RVP, and CT chets imaging showing New ill-defined peribronchovascular groundglass nodular opacities throughout both lungs. Of note, patient with multiple abx allergies. Clarified meropenem allergy. Patient states she has taken Ertapenem last year but reports she has itching whenever she takes abx so she was given Benadryl prior to administration of Ertapenem last year and tolerated.     #COPD exacerbation   #pneumonia   -stop Levaquin  -start Ertapenem 1 g IVPB once daily  -can give one dose of Vancomycin if any clinical signs of decompensation.   -additional recs as per pulm team   -obtain sputum culture   -f/u urine strep/legionella antigens   -f/u MRSA PCR   -f/u all culture data.     Case seen and discussed with Dr. Whitaker who agrees with assessment and plan. Note not final until attending addendum.    Patient  is a 74 year old female with a past medical history of ESBL proteus bacteremia secondary to aspiration pna in 12/2023, recurrent pna, hx of ESBL proteus mirabilis and MRSA in sputum cultures,  tracheomalacia status post tracheoplasty, COPD/bronchiectasis, asthma and adrenal insuffiency on chronic steroids (solumedrol 8 mg daily),  type a aortic dissection s/p repair, s/p TAVR in 2023 diabetes, A-fib, colorectal cancer status post total colectomy with colostomy, seizures, HTN, HLD who presents with progressively worsening shortness of breath, sore throat, and cough for the past 1 week. Patient with intermittent requirement for oxygen. No fevers since admission. Infectious workup thus far notable for leukocytosis, procal 0.11, negative RVP, and CT chets imaging showing New ill-defined peribronchovascular groundglass nodular opacities throughout both lungs. Of note, patient with multiple abx allergies. Clarified meropenem allergy. Patient states she has taken Ertapenem last year but reports she has itching whenever she takes abx so she was given Benadryl prior to administration of Ertapenem last year and tolerated.     #COPD exacerbation   #pneumonia   -stop Levaquin  -start Ertapenem 1 g IVPB once daily. Recommend pretreatment with as per patient's request from prior administration   -can give one dose of Vancomycin if any clinical signs of decompensation.   -additional recs as per pulm team   -obtain sputum culture   -f/u urine strep/legionella antigens   -f/u MRSA PCR   -f/u all culture data.     Case seen and discussed with Dr. Whitaker who agrees with assessment and plan. Note not final until attending addendum.    Patient  is a 74 year old female with a past medical history of ESBL proteus bacteremia secondary to aspiration pna in 12/2023, recurrent pna, hx of ESBL proteus mirabilis and MRSA in sputum cultures,  tracheomalacia status post tracheoplasty, COPD/bronchiectasis, asthma and adrenal insuffiency on chronic steroids (solumedrol 8 mg daily),  type a aortic dissection s/p repair, s/p TAVR in 2023 diabetes, A-fib, colorectal cancer status post total colectomy with colostomy, seizures, HTN, HLD who presents with progressively worsening shortness of breath, sore throat, and cough for the past 1 week. Patient with intermittent requirement for oxygen. No fevers since admission. Infectious workup thus far notable for leukocytosis, procal 0.11, negative RVP, and CT chets imaging showing New ill-defined peribronchovascular groundglass nodular opacities throughout both lungs. Of note, patient with multiple abx allergies. Clarified meropenem allergy. Patient states she has taken Ertapenem last year but reports she has itching whenever she takes abx so she was given Benadryl prior to administration of Ertapenem last year and tolerated.     #COPD   #pneumonia   -stop Levaquin  -start Ertapenem 1 g IVPB once daily. Recommend pretreatment with as per patient's request from prior administration   -can give one dose of Vancomycin if any clinical signs of decompensation.   -additional recs as per pulm team   -obtain sputum culture   -f/u urine strep/legionella antigens   -f/u MRSA PCR   -f/u all culture data.     Case seen and discussed with Dr. Whitaker who agrees with assessment and plan. Note not final until attending addendum.

## 2024-10-10 NOTE — ADVANCED PRACTICE NURSE CONSULT - ASSESSMENT
Chart reviewed &events noted. In at bedside to f/u consult request re: drain management/pouching . Daughter, also left a message regarding pt's re admittance & needing ostomy supplies. Pt is well known to ostomy team from previous admissions with permanent end colostomy  Pt is independent with ostomy care. "I do it myself". On assessment pouching system intact ,wearing [place] 2  piece system,  with closed end/ disposable pouch. + soft yellow stool in pouch. Pt reports brought some supplies but not enough.  Supplies requested. Richar 1 3/4"drainable pouch #[id number], Skin barrier #[id number] (maybe used as disposables as needed). Discussed w/ staff RN.  Pt placed on floor care for ostomy, no needs at this time. Please change pouching system 2x /week. Empty/Dispose of colostomy pouch 1-2x daily or as needed.Remains available for reconsultation.

## 2024-10-11 LAB
ALBUMIN SERPL ELPH-MCNC: 3.5 G/DL — SIGNIFICANT CHANGE UP (ref 3.3–5)
ALP SERPL-CCNC: 62 U/L — SIGNIFICANT CHANGE UP (ref 40–120)
ALT FLD-CCNC: 15 U/L — SIGNIFICANT CHANGE UP (ref 10–45)
ANION GAP SERPL CALC-SCNC: 11 MMOL/L — SIGNIFICANT CHANGE UP (ref 5–17)
AST SERPL-CCNC: 13 U/L — SIGNIFICANT CHANGE UP (ref 10–40)
BASOPHILS # BLD AUTO: 0.01 K/UL — SIGNIFICANT CHANGE UP (ref 0–0.2)
BASOPHILS NFR BLD AUTO: 0.1 % — SIGNIFICANT CHANGE UP (ref 0–2)
BILIRUB SERPL-MCNC: 0.2 MG/DL — SIGNIFICANT CHANGE UP (ref 0.2–1.2)
BUN SERPL-MCNC: 12 MG/DL — SIGNIFICANT CHANGE UP (ref 7–23)
CALCIUM SERPL-MCNC: 8.9 MG/DL — SIGNIFICANT CHANGE UP (ref 8.4–10.5)
CHLORIDE SERPL-SCNC: 104 MMOL/L — SIGNIFICANT CHANGE UP (ref 96–108)
CO2 SERPL-SCNC: 24 MMOL/L — SIGNIFICANT CHANGE UP (ref 22–31)
CREAT SERPL-MCNC: 0.62 MG/DL — SIGNIFICANT CHANGE UP (ref 0.5–1.3)
EGFR: 92 ML/MIN/1.73M2 — SIGNIFICANT CHANGE UP
EOSINOPHIL # BLD AUTO: 0.01 K/UL — SIGNIFICANT CHANGE UP (ref 0–0.5)
EOSINOPHIL NFR BLD AUTO: 0.1 % — SIGNIFICANT CHANGE UP (ref 0–6)
GLUCOSE BLDC GLUCOMTR-MCNC: 147 MG/DL — HIGH (ref 70–99)
GLUCOSE BLDC GLUCOMTR-MCNC: 262 MG/DL — HIGH (ref 70–99)
GLUCOSE BLDC GLUCOMTR-MCNC: 270 MG/DL — HIGH (ref 70–99)
GLUCOSE BLDC GLUCOMTR-MCNC: 303 MG/DL — HIGH (ref 70–99)
GLUCOSE SERPL-MCNC: 294 MG/DL — HIGH (ref 70–99)
HCT VFR BLD CALC: 35.3 % — SIGNIFICANT CHANGE UP (ref 34.5–45)
HGB BLD-MCNC: 10.5 G/DL — LOW (ref 11.5–15.5)
IMM GRANULOCYTES NFR BLD AUTO: 0.6 % — SIGNIFICANT CHANGE UP (ref 0–0.9)
LYMPHOCYTES # BLD AUTO: 0.52 K/UL — LOW (ref 1–3.3)
LYMPHOCYTES # BLD AUTO: 6.3 % — LOW (ref 13–44)
MAGNESIUM SERPL-MCNC: 2 MG/DL — SIGNIFICANT CHANGE UP (ref 1.6–2.6)
MCHC RBC-ENTMCNC: 21.8 PG — LOW (ref 27–34)
MCHC RBC-ENTMCNC: 29.7 GM/DL — LOW (ref 32–36)
MCV RBC AUTO: 73.4 FL — LOW (ref 80–100)
MONOCYTES # BLD AUTO: 0.14 K/UL — SIGNIFICANT CHANGE UP (ref 0–0.9)
MONOCYTES NFR BLD AUTO: 1.7 % — LOW (ref 2–14)
NEUTROPHILS # BLD AUTO: 7.52 K/UL — HIGH (ref 1.8–7.4)
NEUTROPHILS NFR BLD AUTO: 91.2 % — HIGH (ref 43–77)
NRBC # BLD: 0 /100 WBCS — SIGNIFICANT CHANGE UP (ref 0–0)
PHOSPHATE SERPL-MCNC: 2 MG/DL — LOW (ref 2.5–4.5)
PLATELET # BLD AUTO: 318 K/UL — SIGNIFICANT CHANGE UP (ref 150–400)
POTASSIUM SERPL-MCNC: 4.5 MMOL/L — SIGNIFICANT CHANGE UP (ref 3.5–5.3)
POTASSIUM SERPL-SCNC: 4.5 MMOL/L — SIGNIFICANT CHANGE UP (ref 3.5–5.3)
PROT SERPL-MCNC: 6.5 G/DL — SIGNIFICANT CHANGE UP (ref 6–8.3)
RBC # BLD: 4.81 M/UL — SIGNIFICANT CHANGE UP (ref 3.8–5.2)
RBC # FLD: 19 % — HIGH (ref 10.3–14.5)
S PNEUM AG UR QL: NEGATIVE — SIGNIFICANT CHANGE UP
SODIUM SERPL-SCNC: 139 MMOL/L — SIGNIFICANT CHANGE UP (ref 135–145)
WBC # BLD: 8.25 K/UL — SIGNIFICANT CHANGE UP (ref 3.8–10.5)
WBC # FLD AUTO: 8.25 K/UL — SIGNIFICANT CHANGE UP (ref 3.8–10.5)

## 2024-10-11 PROCEDURE — 99222 1ST HOSP IP/OBS MODERATE 55: CPT | Mod: FS

## 2024-10-11 PROCEDURE — 99497 ADVNCD CARE PLAN 30 MIN: CPT

## 2024-10-11 PROCEDURE — 99232 SBSQ HOSP IP/OBS MODERATE 35: CPT

## 2024-10-11 PROCEDURE — 99233 SBSQ HOSP IP/OBS HIGH 50: CPT

## 2024-10-11 PROCEDURE — 99233 SBSQ HOSP IP/OBS HIGH 50: CPT | Mod: GC

## 2024-10-11 RX ORDER — ERTAPENEM 1 G/1
1000 INJECTION, POWDER, LYOPHILIZED, FOR SOLUTION INTRAMUSCULAR; INTRAVENOUS EVERY 24 HOURS
Refills: 0 | Status: DISCONTINUED | OUTPATIENT
Start: 2024-10-11 | End: 2024-10-15

## 2024-10-11 RX ORDER — MUPIROCIN 20 MG/G
1 OINTMENT TOPICAL
Refills: 0 | Status: COMPLETED | OUTPATIENT
Start: 2024-10-11 | End: 2024-10-15

## 2024-10-11 RX ORDER — IPRATROPIUM BROMIDE AND ALBUTEROL SULFATE .5; 3 MG/3ML; MG/3ML
3 SOLUTION RESPIRATORY (INHALATION) EVERY 6 HOURS
Refills: 0 | Status: DISCONTINUED | OUTPATIENT
Start: 2024-10-11 | End: 2024-10-14

## 2024-10-11 RX ORDER — SOD PHOS DI, MONO/K PHOS MONO 250 MG
1 TABLET ORAL EVERY 6 HOURS
Refills: 0 | Status: COMPLETED | OUTPATIENT
Start: 2024-10-11 | End: 2024-10-11

## 2024-10-11 RX ORDER — SERTRALINE HYDROCHLORIDE 100 MG/1
50 TABLET, FILM COATED ORAL DAILY
Refills: 0 | Status: DISCONTINUED | OUTPATIENT
Start: 2024-10-11 | End: 2024-10-15

## 2024-10-11 RX ORDER — LABETALOL HYDROCHLORIDE 200 MG/1
100 TABLET, FILM COATED ORAL THREE TIMES A DAY
Refills: 0 | Status: DISCONTINUED | OUTPATIENT
Start: 2024-10-11 | End: 2024-10-15

## 2024-10-11 RX ORDER — SOD PHOS DI, MONO/K PHOS MONO 250 MG
1 TABLET ORAL EVERY 6 HOURS
Refills: 0 | Status: DISCONTINUED | OUTPATIENT
Start: 2024-10-11 | End: 2024-10-11

## 2024-10-11 RX ORDER — LACOSAMIDE 10 MG/ML
100 SOLUTION ORAL
Refills: 0 | Status: DISCONTINUED | OUTPATIENT
Start: 2024-10-11 | End: 2024-10-15

## 2024-10-11 RX ORDER — LEVETIRACETAM 1000 MG
1500 TABLET ORAL
Refills: 0 | Status: DISCONTINUED | OUTPATIENT
Start: 2024-10-11 | End: 2024-10-15

## 2024-10-11 RX ORDER — DIPHENHYDRAMINE HCL 12.5MG/5ML
25 LIQUID (ML) ORAL DAILY
Refills: 0 | Status: DISCONTINUED | OUTPATIENT
Start: 2024-10-11 | End: 2024-10-15

## 2024-10-11 RX ADMIN — Medication 3: at 08:22

## 2024-10-11 RX ADMIN — Medication 25 MILLIGRAM(S): at 12:07

## 2024-10-11 RX ADMIN — Medication 3 MILLIGRAM(S): at 21:45

## 2024-10-11 RX ADMIN — Medication 2 PUFF(S): at 12:08

## 2024-10-11 RX ADMIN — Medication 500 MILLIGRAM(S): at 12:08

## 2024-10-11 RX ADMIN — APIXABAN 5 MILLIGRAM(S): 5 TABLET, FILM COATED ORAL at 17:27

## 2024-10-11 RX ADMIN — ERTAPENEM 120 MILLIGRAM(S): 1 INJECTION, POWDER, LYOPHILIZED, FOR SOLUTION INTRAMUSCULAR; INTRAVENOUS at 13:02

## 2024-10-11 RX ADMIN — MULTI VITAMIN/MINERAL SUPPLEMENT WITH ASCORBIC ACID, NIACIN, PYRIDOXINE, PANTOTHENIC ACID, FOLIC ACID, RIBOFLAVIN, THIAMIN, BIOTIN, COBALAMIN AND ZINC. 1 TABLET(S): 60; 20; 12.5; 10; 10; 1.7; 1.5; 1; .3; .006 TABLET, COATED ORAL at 12:07

## 2024-10-11 RX ADMIN — MUPIROCIN 1 APPLICATION(S): 20 OINTMENT TOPICAL at 05:20

## 2024-10-11 RX ADMIN — INSULIN GLARGINE 24 UNIT(S): 300 INJECTION, SOLUTION SUBCUTANEOUS at 08:22

## 2024-10-11 RX ADMIN — Medication 200 MILLIGRAM(S): at 21:45

## 2024-10-11 RX ADMIN — MONTELUKAST SODIUM 10 MILLIGRAM(S): 10 TABLET, FILM COATED ORAL at 12:08

## 2024-10-11 RX ADMIN — Medication 1 PACKET(S): at 17:27

## 2024-10-11 RX ADMIN — LACOSAMIDE 100 MILLIGRAM(S): 10 SOLUTION ORAL at 17:27

## 2024-10-11 RX ADMIN — Medication 1500 MILLIGRAM(S): at 17:26

## 2024-10-11 RX ADMIN — Medication 17 GRAM(S): at 12:09

## 2024-10-11 RX ADMIN — Medication 325 MILLIGRAM(S): at 12:08

## 2024-10-11 RX ADMIN — TIOTROPIUM BROMIDE INHALATION SPRAY 2 PUFF(S): 3.12 SPRAY, METERED RESPIRATORY (INHALATION) at 12:09

## 2024-10-11 RX ADMIN — VALACYCLOVIR 1000 MILLIGRAM(S): 1000 TABLET ORAL at 05:18

## 2024-10-11 RX ADMIN — Medication 1 PACKET(S): at 12:08

## 2024-10-11 RX ADMIN — VALACYCLOVIR 1000 MILLIGRAM(S): 1000 TABLET ORAL at 17:27

## 2024-10-11 RX ADMIN — Medication 200 MILLIGRAM(S): at 05:21

## 2024-10-11 RX ADMIN — GABAPENTIN 100 MILLIGRAM(S): 800 TABLET, FILM COATED ORAL at 21:45

## 2024-10-11 RX ADMIN — METHYLPREDNISOLONE ACETATE 40 MILLIGRAM(S): 80 INJECTION, SUSPENSION INTRA-ARTICULAR; INTRALESIONAL; INTRAMUSCULAR; SOFT TISSUE at 05:18

## 2024-10-11 RX ADMIN — CHLORHEXIDINE GLUCONATE ORAL RINSE 1 APPLICATION(S): 1.2 SOLUTION DENTAL at 12:12

## 2024-10-11 RX ADMIN — Medication 2 TABLET(S): at 21:45

## 2024-10-11 RX ADMIN — Medication 2 PUFF(S): at 17:27

## 2024-10-11 RX ADMIN — LABETALOL HYDROCHLORIDE 100 MILLIGRAM(S): 200 TABLET, FILM COATED ORAL at 21:45

## 2024-10-11 RX ADMIN — GABAPENTIN 100 MILLIGRAM(S): 800 TABLET, FILM COATED ORAL at 05:20

## 2024-10-11 RX ADMIN — Medication 75 MILLIGRAM(S): at 12:08

## 2024-10-11 RX ADMIN — INSULIN GLARGINE 6 UNIT(S): 300 INJECTION, SOLUTION SUBCUTANEOUS at 21:45

## 2024-10-11 RX ADMIN — Medication 4: at 17:31

## 2024-10-11 RX ADMIN — APIXABAN 5 MILLIGRAM(S): 5 TABLET, FILM COATED ORAL at 05:18

## 2024-10-11 RX ADMIN — ATORVASTATIN CALCIUM 20 MILLIGRAM(S): 10 TABLET, FILM COATED ORAL at 21:46

## 2024-10-11 RX ADMIN — Medication 3: at 12:10

## 2024-10-11 RX ADMIN — MUPIROCIN 1 APPLICATION(S): 20 OINTMENT TOPICAL at 17:27

## 2024-10-11 RX ADMIN — GABAPENTIN 100 MILLIGRAM(S): 800 TABLET, FILM COATED ORAL at 13:03

## 2024-10-11 RX ADMIN — Medication 200 MILLIGRAM(S): at 13:03

## 2024-10-11 RX ADMIN — Medication 1000 MILLIGRAM(S): at 05:18

## 2024-10-11 RX ADMIN — Medication 1 DOSE(S): at 05:19

## 2024-10-11 RX ADMIN — SERTRALINE HYDROCHLORIDE 50 MILLIGRAM(S): 100 TABLET, FILM COATED ORAL at 17:26

## 2024-10-11 RX ADMIN — Medication 1 DOSE(S): at 17:27

## 2024-10-11 RX ADMIN — LABETALOL HYDROCHLORIDE 100 MILLIGRAM(S): 200 TABLET, FILM COATED ORAL at 17:37

## 2024-10-11 RX ADMIN — PANTOPRAZOLE SODIUM 40 MILLIGRAM(S): 40 TABLET, DELAYED RELEASE ORAL at 05:18

## 2024-10-11 RX ADMIN — Medication 2 PUFF(S): at 05:17

## 2024-10-11 NOTE — PHYSICAL THERAPY INITIAL EVALUATION ADULT - PERTINENT HX OF CURRENT PROBLEM, REHAB EVAL
74 year old female with a past medical history of  type a aortic dissection status postrepair, status post TAVR, asthma on chronic steroids, bronchiectasis, recurrent pneumonia, tracheomalacia status post tracheoplasty, diabetes, A-fib on Eliquis, colorectal cancer status post colostomy who presents with shortness of breath, sore throat, and cough productive of yellow sputum for the past 2 days. CT Chest remarkable for new ill-defined peribronchovascular ground-glass nodular opacities throughout both lungs, likely infectious. Pneumonia. Severe asthma. TESTS: 10/9 CT Chest: New ill-defined peribronchovascular groundglass nodular opacities throughout both lungs, likely infectious. Follow-up chest CT in 8-12 weeks to resolution. CXR:Few small patchy airspace opacities in the right lower lung which may reflect infectious process. 10/10 [facility] duplex lower ext vein scan, right: No acute DVT of the right lower extremity.

## 2024-10-11 NOTE — DIETITIAN INITIAL EVALUATION ADULT - REASON INDICATOR FOR ASSESSMENT
Stage 2 Pressure Injury or greater  Information obtained from: Review of pt's current medical record, interview with pt in her assigned room on [facility]

## 2024-10-11 NOTE — DIETITIAN INITIAL EVALUATION ADULT - REASON FOR ADMISSION
Chart Reviewed, Events Noted  "74 year old female with a past medical history of  type a aortic dissection status postrepair, status post TAVR, asthma on chronic steroids, bronchiectasis, recurrent pneumonia, tracheomalacia status post tracheoplasty, diabetes, A-fib on Eliquis, colorectal cancer status post colostomy who presents with shortness of breath, sore throat, and cough productive of yellow sputum"

## 2024-10-11 NOTE — DIETITIAN INITIAL EVALUATION ADULT - ORAL INTAKE PTA/DIET HISTORY
Pt with food allergy to shellfish. Reports having a "so-so" appetite and PO intakes at home. Did not follow any specific dietary restrictions. Pt denies nausea, vomiting, diarrhea, or constipation. Denies difficulty chewing/swallowing. Denies any vitamin/mineral use at home, pt reports not taking oral nutritional supplement at home as well.

## 2024-10-11 NOTE — PROGRESS NOTE ADULT - PROBLEM SELECTOR PLAN 3
History of aortic dissection s/p repair and TAVR in 2023   History of HTN- on hydralazine, labetalol, nifedipine at home   On clopidogrel at home     -Hold HTN meds for now   -Continue clopidogrel History of aortic dissection s/p repair and TAVR in 2023   History of HTN- on hydralazine, labetalol, nifedipine at home   On clopidogrel at home     -Start labetalol for blood pressure systolic in 140s and hx of aortic dissection, hold other home BP meds   -Continue clopidogrel

## 2024-10-11 NOTE — DIETITIAN INITIAL EVALUATION ADULT - NSFNSPHYEXAMSKINFT_GEN_A_CORE
-- As per nursing wound care note 10/10: Rt heel deep tissue injury  -- Per flow sheets: Bilateral heel deep tissue injury

## 2024-10-11 NOTE — DIETITIAN INITIAL EVALUATION ADULT - PERTINENT MEDS FT
MEDICATIONS  (STANDING):  albuterol    90 MICROgram(s) HFA Inhaler 2 Puff(s) Inhalation every 6 hours  apixaban 5 milliGRAM(s) Oral every 12 hours  ascorbic acid 500 milliGRAM(s) Oral daily  atorvastatin 20 milliGRAM(s) Oral at bedtime  chlorhexidine 2% Cloths 1 Application(s) Topical daily  clopidogrel Tablet 75 milliGRAM(s) Oral daily  dextrose 5%. 1000 milliLiter(s) (50 mL/Hr) IV Continuous <Continuous>  dextrose 5%. 1000 milliLiter(s) (100 mL/Hr) IV Continuous <Continuous>  dextrose 50% Injectable 25 Gram(s) IV Push once  dextrose 50% Injectable 12.5 Gram(s) IV Push once  dextrose 50% Injectable 25 Gram(s) IV Push once  diphenhydrAMINE 25 milliGRAM(s) Oral daily  ertapenem  IVPB 1000 milliGRAM(s) IV Intermittent every 24 hours  ferrous    sulfate 325 milliGRAM(s) Oral daily  fluticasone propionate/ salmeterol 250-50 MICROgram(s) Diskus 1 Dose(s) Inhalation two times a day  gabapentin 100 milliGRAM(s) Oral every 8 hours  glucagon  Injectable 1 milliGRAM(s) IntraMuscular once  insulin glargine Injectable (LANTUS) 6 Unit(s) SubCutaneous at bedtime  insulin glargine Injectable (LANTUS) 24 Unit(s) SubCutaneous every morning  insulin lispro (ADMELOG) corrective regimen sliding scale   SubCutaneous three times a day before meals  insulin lispro (ADMELOG) corrective regimen sliding scale   SubCutaneous at bedtime  lacosamide 100 milliGRAM(s) Oral two times a day  levETIRAcetam 1500 milliGRAM(s) Oral two times a day  methylPREDNISolone sodium succinate Injectable 40 milliGRAM(s) IV Push daily  mexiletine 200 milliGRAM(s) Oral every 8 hours  montelukast 10 milliGRAM(s) Oral daily  multivitamin 1 Tablet(s) Oral daily  mupirocin 2% Nasal 1 Application(s) Both Nostrils two times a day  pantoprazole    Tablet 40 milliGRAM(s) Oral before breakfast  polyethylene glycol 3350 17 Gram(s) Oral daily  potassium phosphate / sodium phosphate Powder (PHOS-NaK) 1 Packet(s) Oral every 6 hours  senna 2 Tablet(s) Oral at bedtime  sertraline 50 milliGRAM(s) Oral daily  tiotropium 2.5 MICROgram(s) Inhaler 2 Puff(s) Inhalation daily  valACYclovir 1000 milliGRAM(s) Oral two times a day    MEDICATIONS  (PRN):  albuterol/ipratropium for Nebulization 3 milliLiter(s) Nebulizer every 6 hours PRN Wheezing  dextrose Oral Gel 15 Gram(s) Oral once PRN Blood Glucose LESS THAN 70 milliGRAM(s)/deciliter  melatonin 3 milliGRAM(s) Oral at bedtime PRN Insomnia

## 2024-10-11 NOTE — CONSULT NOTE ADULT - ASSESSMENT
A/P:74 year old female with a past medical history of  type a aortic dissection status postrepair, status post TAVR in 2023, asthma on chronic steroids, bronchiectasis, recurrent pneumonia, tracheomalacia status post tracheoplasty, diabetes, A-fib on Eliquis, colorectal cancer status post colostomy, seizures, HTN, HLD who presents with shortness of breath, sore throat, and cough productive of yellow sputum for the past 2 days. Patient states her daughter noticed she had increased work of breathing so she brought her in to see her pulmonologist who referred her to the ED for further management. Patient states she was admitted to [facility] last week for chest pressure. On review of systems, patient reports nausea and constipation with her last bowel movement 2 days ago. Per patient, she denies any vision changes, chest pain, abdominal pain, vomiting, diarrhea, rashes, dysuria, hematuria, or blood in stool.    Wound Consult requested to assist w/ management of  Rt heel deep tissue injury  dermatitis for incontinence    Recommendations  Paint with cavilon daily     off load with heel boots      BLE elevation & Compression  Consider ODILON/PVR,   Appreciate Duplex    Moisturize intact skin w/ SWEEN cream BID  Nutrition Consult for optimization in pt w/Continue turning and positioning w/ offloading assistive devices as per protocol  Buttocks/ Sacrum Neptali/  and prn soiling        Continue w/ attends under pads and Pericare w/ purewick care as per protocol  Waffle Cushion to chair when oob to chair  Continue w/ low air loss pressure redistribution bed surface   Care as per medicine, will remain available as requested  If needed upon discharge f/u as outpatient at  and D/w team & RN  Thank you for this consult,  CHIARA Alves-BC, [facility]  [phone]  Nights/ Weekends/ Holidays please call:  General Surgery Consult pager ([phone]) for emergencies  Wound PT for multilayer leg wrapping or VAC issues ([phone])

## 2024-10-11 NOTE — PROGRESS NOTE ADULT - PROBLEM SELECTOR PLAN 5
On lantus 30 units in morning and 8 units in the evening   -Continue 24 units in morning and 6 units nightly   -ISS   -A1c On lantus 30 units in morning and 8 units in the evening. A1c 8.   -Continue 24 units in morning and 6 units nightly   -ISS

## 2024-10-11 NOTE — PROGRESS NOTE ADULT - PROBLEM SELECTOR PLAN 2
Home regimen: -ohtuvayre inhalation suspension, breo ellipta and incruse ellipta- not avaliable inpt. Also on methylpred, albuterol, duonebs, montelukast   -Continue methylprednisolone 8 mg daily   -Start bactrim for PJP ppx on chronic steroids   -albuterol inhaler   -continue montelukast   -Advair one dose BID   -Tiotropium 2 buffs bid   -Pulmonary consult for inpatient regimen Home regimen: -ohtuvayre inhalation suspension, breo ellipta and incruse ellipta- not avaliable inpt. Also on methylpred, albuterol, duonebs, montelukast   -albuterol inhaler   -continue montelukast   -Pulmonary consulted, appreciate reccs     - Advair 250/50 bid, spiriva 2 puffs daily    - start solumedrol IV 40mg daily    - Please provide patient with incentive spirometer and acapella for airway clearance

## 2024-10-11 NOTE — DIETITIAN INITIAL EVALUATION ADULT - PROBLEM/PLAN-4
Attempted to call wife Annelise Russell in effort to confirm dose of prednisone however she did not answer the phone  Left voicemail to have her return her phone call  If not we will confirm first thing in the morning prednisone dose so patient can be restarted on home regimen 
Patient has been off restraints for over 24 hours  Restraints discontinued yesterday 3/1/2023 at 10:00 AM and has remained off them since 
DISPLAY PLAN FREE TEXT

## 2024-10-11 NOTE — PROGRESS NOTE ADULT - SUBJECTIVE AND OBJECTIVE BOX
Interval Events:  MRSA nares positive. Feels cough is a little better today, still with yellow sputum    REVIEW OF SYSTEMS:  Negative except as documented above.      OBJECTIVE:  ICU Vital Signs Last 24 Hrs  T(C): 36.9 (11 Oct 2024 16:51), Max: 36.9 (10 Oct 2024 23:57)  T(F): 98.5 (11 Oct 2024 16:51), Max: 98.5 (10 Oct 2024 23:57)  HR: 71 (11 Oct 2024 17:01) (63 - 71)  BP: 147/75 (11 Oct 2024 16:51) (146/76 - 149/81)  BP(mean): --  ABP: --  ABP(mean): --  RR: 17 (11 Oct 2024 16:51) (17 - 18)  SpO2: 98% (11 Oct 2024 17:01) (93% - 98%)    O2 Parameters below as of 11 Oct 2024 17:01  Patient On (Oxygen Delivery Method): room air              CAPILLARY BLOOD GLUCOSE      POCT Blood Glucose.: 147 mg/dL (11 Oct 2024 21:32)      PHYSICAL EXAM:  General: NAD  HEENT:  prosthetic left eye, moist mucus membranes  Neck: supple  Cardiovascular: RRR  Respiratory: bilateral mild wheezing  Abdomen: soft, nontender  Extremities: warm and well perfused, no edema, no clubbing  Skin: no rashes  Neurological: no focal deficits    HOSPITAL MEDICATIONS:  MEDICATIONS  (STANDING):  albuterol    90 MICROgram(s) HFA Inhaler 2 Puff(s) Inhalation every 6 hours  apixaban 5 milliGRAM(s) Oral every 12 hours  ascorbic acid 500 milliGRAM(s) Oral daily  atorvastatin 20 milliGRAM(s) Oral at bedtime  chlorhexidine 2% Cloths 1 Application(s) Topical daily  clopidogrel Tablet 75 milliGRAM(s) Oral daily  dextrose 5%. 1000 milliLiter(s) (50 mL/Hr) IV Continuous <Continuous>  dextrose 5%. 1000 milliLiter(s) (100 mL/Hr) IV Continuous <Continuous>  dextrose 50% Injectable 25 Gram(s) IV Push once  dextrose 50% Injectable 12.5 Gram(s) IV Push once  dextrose 50% Injectable 25 Gram(s) IV Push once  diphenhydrAMINE 25 milliGRAM(s) Oral daily  ertapenem  IVPB 1000 milliGRAM(s) IV Intermittent every 24 hours  ferrous    sulfate 325 milliGRAM(s) Oral daily  fluticasone propionate/ salmeterol 250-50 MICROgram(s) Diskus 1 Dose(s) Inhalation two times a day  gabapentin 100 milliGRAM(s) Oral every 8 hours  glucagon  Injectable 1 milliGRAM(s) IntraMuscular once  insulin glargine Injectable (LANTUS) 24 Unit(s) SubCutaneous every morning  insulin glargine Injectable (LANTUS) 6 Unit(s) SubCutaneous at bedtime  insulin lispro (ADMELOG) corrective regimen sliding scale   SubCutaneous three times a day before meals  insulin lispro (ADMELOG) corrective regimen sliding scale   SubCutaneous at bedtime  labetalol 100 milliGRAM(s) Oral three times a day  lacosamide 100 milliGRAM(s) Oral two times a day  levETIRAcetam 1500 milliGRAM(s) Oral two times a day  methylPREDNISolone sodium succinate Injectable 40 milliGRAM(s) IV Push daily  mexiletine 200 milliGRAM(s) Oral every 8 hours  montelukast 10 milliGRAM(s) Oral daily  multivitamin 1 Tablet(s) Oral daily  mupirocin 2% Nasal 1 Application(s) Both Nostrils two times a day  pantoprazole    Tablet 40 milliGRAM(s) Oral before breakfast  polyethylene glycol 3350 17 Gram(s) Oral daily  senna 2 Tablet(s) Oral at bedtime  sertraline 50 milliGRAM(s) Oral daily  tiotropium 2.5 MICROgram(s) Inhaler 2 Puff(s) Inhalation daily  valACYclovir 1000 milliGRAM(s) Oral two times a day    MEDICATIONS  (PRN):  albuterol/ipratropium for Nebulization 3 milliLiter(s) Nebulizer every 6 hours PRN Wheezing  dextrose Oral Gel 15 Gram(s) Oral once PRN Blood Glucose LESS THAN 70 milliGRAM(s)/deciliter  melatonin 3 milliGRAM(s) Oral at bedtime PRN Insomnia      LABS:                        10.5   8.25  )-----------( 318      ( 11 Oct 2024 10:23 )             35.3     Hgb Trend: 10.5<--, 9.9<--, 10.1<--  10-11    139  |  104  |  12  ----------------------------<  294[H]  4.5   |  24  |  0.62    Ca    8.9      11 Oct 2024 10:26  Phos  2.0     10-11  Mg     2.0     10-11    TPro  6.5  /  Alb  3.5  /  TBili  0.2  /  DBili  x   /  AST  13  /  ALT  15  /  AlkPhos  62  10-11    Creatinine Trend: 0.62<--, 0.61<--, 0.72<--    Urinalysis Basic - ( 11 Oct 2024 10:26 )    Color: x / Appearance: x / SG: x / pH: x  Gluc: 294 mg/dL / Ketone: x  / Bili: x / Urobili: x   Blood: x / Protein: x / Nitrite: x   Leuk Esterase: x / RBC: x / WBC x   Sq Epi: x / Non Sq Epi: x / Bacteria: x            MICROBIOLOGY:     Culture - Sputum (collected 10 Oct 2024 11:15)  Source: .Sputum Sputum  Gram Stain (10 Oct 2024 19:23):    No polymorphonuclear leukocytes per low power field    No Squamous epithelial cells per low power field    No organisms seen per oil power field  Preliminary Report (11 Oct 2024 08:42):    Commensal val consistent with body site    Culture - Blood (collected 09 Oct 2024 09:56)  Source: .Blood BLOOD  Preliminary Report (11 Oct 2024 14:02):    No growth at 48 Hours    Culture - Blood (collected 09 Oct 2024 09:50)  Source: .Blood BLOOD  Preliminary Report (11 Oct 2024 14:02):    No growth at 48 Hours        RADIOLOGY:  [x] Reviewed and interpreted by me

## 2024-10-11 NOTE — DIETITIAN INITIAL EVALUATION ADULT - OTHER INFO
Nutrition-Related Concerns:    Endo:  -- Hx of Diabetes  -- Most recent A1c=8.0 % (10-10-24), indicates suboptimal glycemic control  -- On solu-medrol, steroid use may be contributing to elevated blood glucose levels   -- Ordered for Lantus 24U in the morning, Lantus 6U at bedtime, and SSI for coverage     Renal:  - Hypophosphatemic; ordered for Phos-NaK powder for repletion

## 2024-10-11 NOTE — DIETITIAN INITIAL EVALUATION ADULT - PHYSCIAL ASSESSMENT
Weights:    Current Admission Weights:  - Dosing weight: 77 kg/ 169.8 pounds  (10/9/24)  - Daily weight: None Available     Weight History per Prabha STEVENS:  - None Available     IBW:  135 pounds   %IBW: 126%

## 2024-10-11 NOTE — PHYSICAL THERAPY INITIAL EVALUATION ADULT - STANDING BALANCE: STATIC
Chronic hx of Tourette disorder, has been referred to psychiatry previously however, patient did not follow up. Patient will call psychiatry.  Currently on clonidine 0.3 mg TID.      fair plus

## 2024-10-11 NOTE — PROGRESS NOTE ADULT - ASSESSMENT
74 year old female with Type A aortic dissection (s/p repair 2023), TAVR (2023), MM, asthma, bronchiectasis, tracheomalacia (s/p tracheoplasty), DM, Afib (eliquis), colorectal ca (s/p colostomy), seizures, HTN, HLD presents with 2 days of SOB, sore throat and productive yellow sputum. Patient saw her pulmonologist Dr. Allison given her symptoms who recommended her go to hospital for evaluation.Was supposed to start taking ohtuvayre but reportedly no taking yet. Takes breo, incruse ellipta, tezpire monthly, methylpred 8mg, and montelukast.     CT chest with bilateral GGO and mucous secretion in trachea and mediastinal nodes. Given respiratory symptoms, yellow sputum, suspect asthma/COPD exacerbation. Still with audible wheezing today but cough is improving.    #Severe asthma  #COPD  #Bilateral GGO  #Mediastinal nodes  #Bronchiectasis    Recommendations:  - agree with continuing antibiotics (has numerous drug allergies), currently on ertapenem  - Advair 250/50 bid, spiriva 2 puffs daily  - continue solumedrol IV 40mg daily with plan to taper to methylpred PO and taper by 10mg every 3 days  - Please provide patient with incentive spirometer and acapella for airway clearance  - goal spo2 >90%  - Obtain RVP, sputum culture, blood culture, MRSA nares, urine strep/legionella  - will need repeat CT chest in 6 weeks to assess for resolution    Recommendations are not final pending attending attestation. 74 year old female with Type A aortic dissection (s/p repair 2023), TAVR (2023), MM, asthma, bronchiectasis, tracheomalacia (s/p tracheoplasty), DM, Afib (eliquis), colorectal ca (s/p colostomy), seizures, HTN, HLD presents with 2 days of SOB, sore throat and productive yellow sputum. Patient saw her pulmonologist Dr. Allison given her symptoms who recommended her go to hospital for evaluation.Was supposed to start taking ohtuvayre but reportedly no taking yet. Takes breo, incruse ellipta, tezpire monthly, methylpred 8mg, and montelukast.     CT chest with bilateral GGO and mucous secretion in trachea and mediastinal nodes. Given respiratory symptoms, yellow sputum, suspect asthma/COPD exacerbation. Still with audible wheezing today but cough is improving.    #Severe asthma  #COPD  #Bilateral GGO  #Mediastinal nodes  #Bronchiectasis    Recommendations:  - agree with continuing antibiotics (has numerous drug allergies), currently on ertapenem  - Advair 250/50 bid, spiriva 2 puffs daily  - continue solumedrol IV 40mg daily with plan to taper to methylpred PO and taper by 10mg every 3 days and eventually to home 8mg daily  - mupirocin for MRSA+ nares  - Please provide patient with incentive spirometer and acapella for airway clearance  - goal spo2 >90%  - Obtain RVP, sputum culture, blood culture, MRSA nares, urine strep/legionella  - will need repeat CT chest in 6 weeks to assess for resolution    Recommendations are not final pending attending attestation.

## 2024-10-11 NOTE — CONSULT NOTE ADULT - TIME BILLING
I attest my time as attending is greater than 50% of the total combined time spent on qualifying patient care activities by the PA/NP and attending. I have made amendments to the documentation where necessary. Additional comments: Pt seen and examined with ACP.  Assessment and plan reviewed and discussed.  Agree with above. I spent 55  minutes face to face w/ this pt of which more than 50% of the time was spent counseling & coordinating care of this pt.

## 2024-10-11 NOTE — PROGRESS NOTE ADULT - ATTENDING COMMENTS
Pt today feels short of breath, improved with nasal canula. Continues to have cough. No subjective fevers/chills. On exam, no acute distress, on 2L of NC, mild expiratory wheezing on exam.     #sepsis secondary to pneumonia-- improving  #hx of severe asthma  #hx of aortic dissection, s/p repair  #hx of TAVR    - c/w ertapenem (10/10 - present)  - started on solumedrol 40mg IV daily (10/10 - present). Pt is on methylprednisolone 8mg daily at home  - blood cultures negative thus far, urinary leg negative  - sputum culture collected  - on advair, spiriva, montelukast  - continue eliquis and aspirin   - continue mexilitine   - In the setting of relative hypotension, we are holding home hydralazine  (25mg q8hr), nifedipine ER (20mg daily), and labetolol 100 mg q8hr   - Will restart home antihypertensives, starting with labetolol when pt is able to tolerate, silas given hx of aortic dissection  - pulm and ID are onboard, appreciate recs    rest of plan as above. Pt today feels short of breath, improved with nasal canula. Continues to have cough. No subjective fevers/chills. On exam, no acute distress, on 2L of NC, mild expiratory wheezing on exam.     #sepsis secondary to pneumonia-- improving  #hx of severe asthma  #hx of aortic dissection, s/p repair  #hx of TAVR    - c/w ertapenem (10/10 - present)  - started on solumedrol 40mg IV daily (10/10 - present). Pt is on methylprednisolone 8mg daily at home  - blood cultures negative thus far, urinary leg negative  - sputum culture collected  - on advair, spiriva, montelukast  - continue eliquis and aspirin   - continue mexilitine   - In the setting of relative hypotension, we are holding home hydralazine  (25mg q8hr), nifedipine ER (20mg daily), and labetolol 100 mg q8hr   - Will restart home antihypertensives, starting with labetolol when pt is able to tolerate, silas given hx of aortic dissection  - pulm and ID are onboard, appreciate recs    Full code-- confirmed with the patient on 10/11.  rest of plan as above.

## 2024-10-11 NOTE — DIETITIAN INITIAL EVALUATION ADULT - PERTINENT LABORATORY DATA
10-11    139  |  104  |  12  ----------------------------<  294[H]  4.5   |  24  |  0.62    Ca    8.9      11 Oct 2024 10:26  Phos  2.0     10-11  Mg     2.0     10-11    TPro  6.5  /  Alb  3.5  /  TBili  0.2  /  DBili  x   /  AST  13  /  ALT  15  /  AlkPhos  62  10-11    POCT Blood Glucose.: 270 mg/dL (10-11-24 @ 11:50)  POCT Blood Glucose.: 262 mg/dL (10-11-24 @ 08:15)  POCT Blood Glucose.: 134 mg/dL (10-10-24 @ 22:44)  POCT Blood Glucose.: 126 mg/dL (10-10-24 @ 21:10)  POCT Blood Glucose.: 240 mg/dL (10-10-24 @ 16:27)  POCT Blood Glucose.: 242 mg/dL (10-10-24 @ 14:20)    A1C with Estimated Average Glucose Result: 8.0 % (10-10-24 @ 09:19)  A1C with Estimated Average Glucose Result: 7.9 % (10-09-24 @ 10:07)  A1C with Estimated Average Glucose Result: 7.8 % (07-03-24 @ 14:53)

## 2024-10-11 NOTE — PROGRESS NOTE ADULT - CONVERSATION DETAILS
Spoke with the patient regarding her advanced directives. She was very clear that she would like to be FULL CODE. She's had conversations regarding code status in the past. Her daughter is well aware of her wishes. States her daughter is her HCP

## 2024-10-11 NOTE — PROGRESS NOTE ADULT - SUBJECTIVE AND OBJECTIVE BOX
RAYRAY RODRIGUEZ  74y  Female    Complaints:  Subjective:     No acute o/n events. Pt denies subj fevers/chills, HA, dizziness, chest pain, palpitations, n/v, abd pain, dysuria, diarrhea      FAMILY HISTORY:  Family history of asthma    Family history of breast cancer (Sibling)    Family history of diabetes mellitus type II      74yVital Signs Last 24 Hrs  T(C): 36.9 (10 Oct 2024 23:57), Max: 36.9 (10 Oct 2024 10:27)  T(F): 98.5 (10 Oct 2024 23:57), Max: 98.5 (10 Oct 2024 23:57)  HR: 63 (10 Oct 2024 23:57) (63 - 81)  BP: 146/76 (10 Oct 2024 23:57) (104/66 - 146/76)  BP(mean): --  RR: 18 (10 Oct 2024 23:57) (18 - 18)  SpO2: 94% (10 Oct 2024 23:57) (94% - 97%)    Parameters below as of 10 Oct 2024 23:57  Patient On (Oxygen Delivery Method): room air          PHYSICAL EXAM:  GENERAL: NAD, well-groomed, well-developed  HEAD:  Atraumatic, Normocephalic  EYES: EOMI, PERRLA, conjunctiva and sclera clear  ENMT: No tonsillar erythema, exudates, or enlargement; Moist mucous membranes, Good dentition, No lesions  NECK: Supple, No JVD, Normal thyroid  NERVOUS SYSTEM:  Alert & Oriented X3, Good concentration; Motor Strength 5/5 B/L upper and lower extremities; DTRs 2+ intact and symmetric  CHEST/LUNG: Clear to percussion bilaterally; No rales, rhonchi, wheezing, or rubs  HEART: Regular rate and rhythm; No murmurs, rubs, or gallops  ABDOMEN: Soft, Nontender, Nondistended; Bowel sounds present  EXTREMITIES:  2+ Peripheral Pulses, No clubbing, cyanosis, or edema  LYMPH: No lymphadenopathy noted  SKIN: No rashes or lesions      Consultant(s) Notes Reviewed:  [x ] YES  [ ] NO  Care Discussed with Consultants/Other Providers [ x] YES  [ ] NO    LABS:          Culture - Sputum (collected 10-10-24 @ 11:15)  Source: .Sputum Sputum  Gram Stain (10-10-24 @ 19:23):    No polymorphonuclear leukocytes per low power field    No Squamous epithelial cells per low power field    No organisms seen per oil power field            Female  RADIOLOGY & ADDITIONAL TESTS:    Imaging Personally Reviewed:  [ ] YES  [ ] NO    MedsMEDICATIONS  (STANDING):  albuterol    90 MICROgram(s) HFA Inhaler 2 Puff(s) Inhalation every 6 hours  apixaban 5 milliGRAM(s) Oral every 12 hours  ascorbic acid 500 milliGRAM(s) Oral daily  atorvastatin 20 milliGRAM(s) Oral at bedtime  chlorhexidine 2% Cloths 1 Application(s) Topical daily  clopidogrel Tablet 75 milliGRAM(s) Oral daily  dextrose 5%. 1000 milliLiter(s) (100 mL/Hr) IV Continuous <Continuous>  dextrose 5%. 1000 milliLiter(s) (50 mL/Hr) IV Continuous <Continuous>  dextrose 50% Injectable 25 Gram(s) IV Push once  dextrose 50% Injectable 12.5 Gram(s) IV Push once  dextrose 50% Injectable 25 Gram(s) IV Push once  ertapenem  IVPB 1000 milliGRAM(s) IV Intermittent every 24 hours  ferrous    sulfate 325 milliGRAM(s) Oral daily  fluticasone propionate/ salmeterol 250-50 MICROgram(s) Diskus 1 Dose(s) Inhalation two times a day  gabapentin 100 milliGRAM(s) Oral every 8 hours  glucagon  Injectable 1 milliGRAM(s) IntraMuscular once  insulin glargine Injectable (LANTUS) 6 Unit(s) SubCutaneous at bedtime  insulin glargine Injectable (LANTUS) 24 Unit(s) SubCutaneous every morning  insulin lispro (ADMELOG) corrective regimen sliding scale   SubCutaneous three times a day before meals  insulin lispro (ADMELOG) corrective regimen sliding scale   SubCutaneous at bedtime  levETIRAcetam 1000 milliGRAM(s) Oral two times a day  methylPREDNISolone sodium succinate Injectable 40 milliGRAM(s) IV Push daily  mexiletine 200 milliGRAM(s) Oral every 8 hours  montelukast 10 milliGRAM(s) Oral daily  multivitamin 1 Tablet(s) Oral daily  mupirocin 2% Nasal 1 Application(s) Both Nostrils two times a day  pantoprazole    Tablet 40 milliGRAM(s) Oral before breakfast  polyethylene glycol 3350 17 Gram(s) Oral daily  senna 2 Tablet(s) Oral at bedtime  tiotropium 2.5 MICROgram(s) Inhaler 2 Puff(s) Inhalation daily  valACYclovir 1000 milliGRAM(s) Oral two times a day    MEDICATIONS  (PRN):  dextrose Oral Gel 15 Gram(s) Oral once PRN Blood Glucose LESS THAN 70 milliGRAM(s)/deciliter  melatonin 3 milliGRAM(s) Oral at bedtime PRN Insomnia      HEALTH ISSUES - PROBLEM Dx:  Pneumonia    Severe asthma    H/O aortic dissection    Mild anemia    Type 2 diabetes mellitus    Atrial fibrillation    Seizure    HLD (hyperlipidemia)    Need for prophylactic measure             RAYRAY RODRIGUEZ  74y  Female    Complaints:  Subjective:     No acute o/n events. This morning, patient reports some shortness of breath but denies any chest pain, abdominal pain, or other complaints.     FAMILY HISTORY:  Family history of asthma    Family history of breast cancer (Sibling)    Family history of diabetes mellitus type II      74yVital Signs Last 24 Hrs  T(C): 36.9 (10 Oct 2024 23:57), Max: 36.9 (10 Oct 2024 10:27)  T(F): 98.5 (10 Oct 2024 23:57), Max: 98.5 (10 Oct 2024 23:57)  HR: 63 (10 Oct 2024 23:57) (63 - 81)  BP: 146/76 (10 Oct 2024 23:57) (104/66 - 146/76)  BP(mean): --  RR: 18 (10 Oct 2024 23:57) (18 - 18)  SpO2: 94% (10 Oct 2024 23:57) (94% - 97%)    Parameters below as of 10 Oct 2024 23:57  Patient On (Oxygen Delivery Method): room air    PHYSICAL EXAM:  GENERAL: NAD, lying in bed comfortably  HEAD:  Normocephalic  EYES: Sclera clear  ENT: Moist mucous membranes  NECK: Supple, No JVD  CHEST/LUNG: Course breath sounds bilaterally, No rales, rhonchi, wheezing, or rubs.  HEART: Regular rate and rhythm; No murmurs, rubs, or gallops  ABDOMEN: BSx4; Soft, nontender, nondistended, +Colostomy bag in place   EXTREMITIES:  2+ Peripheral Pulses, brisk capillary refill. +Tenderness to palpation of RLE, no erythema, swelling or warmth   NERVOUS SYSTEM:  A&Ox3, no focal deficits   SKIN: No rashes or lesions    Consultant(s) Notes Reviewed:  [x ] YES  [ ] NO  Care Discussed with Consultants/Other Providers [ x] YES  [ ] NO    LABS:                        10.5   8.25  )-----------( 318      ( 11 Oct 2024 10:23 )             35.3       10-11    139  |  104  |  12  ----------------------------<  294[H]  4.5   |  24  |  0.62    Ca    8.9      11 Oct 2024 10:26  Phos  2.0     10-11  Mg     2.0     10-11    TPro  6.5  /  Alb  3.5  /  TBili  0.2  /  DBili  x   /  AST  13  /  ALT  15  /  AlkPhos  62  10-11              Urinalysis Basic - ( 11 Oct 2024 10:26 )    Color: x / Appearance: x / SG: x / pH: x  Gluc: 294 mg/dL / Ketone: x  / Bili: x / Urobili: x   Blood: x / Protein: x / Nitrite: x   Leuk Esterase: x / RBC: x / WBC x   Sq Epi: x / Non Sq Epi: x / Bacteria: x    CAPILLARY BLOOD GLUCOSE      POCT Blood Glucose.: 270 mg/dL (11 Oct 2024 11:50)  Culture - Sputum (collected 10-10-24 @ 11:15)  Source: .Sputum Sputum  Gram Stain (10-10-24 @ 19:23):    No polymorphonuclear leukocytes per low power field    No Squamous epithelial cells per low power field    No organisms seen per oil power field    MedsMEDICATIONS  (STANDING):  albuterol    90 MICROgram(s) HFA Inhaler 2 Puff(s) Inhalation every 6 hours  apixaban 5 milliGRAM(s) Oral every 12 hours  ascorbic acid 500 milliGRAM(s) Oral daily  atorvastatin 20 milliGRAM(s) Oral at bedtime  chlorhexidine 2% Cloths 1 Application(s) Topical daily  clopidogrel Tablet 75 milliGRAM(s) Oral daily  dextrose 5%. 1000 milliLiter(s) (100 mL/Hr) IV Continuous <Continuous>  dextrose 5%. 1000 milliLiter(s) (50 mL/Hr) IV Continuous <Continuous>  dextrose 50% Injectable 25 Gram(s) IV Push once  dextrose 50% Injectable 12.5 Gram(s) IV Push once  dextrose 50% Injectable 25 Gram(s) IV Push once  ertapenem  IVPB 1000 milliGRAM(s) IV Intermittent every 24 hours  ferrous    sulfate 325 milliGRAM(s) Oral daily  fluticasone propionate/ salmeterol 250-50 MICROgram(s) Diskus 1 Dose(s) Inhalation two times a day  gabapentin 100 milliGRAM(s) Oral every 8 hours  glucagon  Injectable 1 milliGRAM(s) IntraMuscular once  insulin glargine Injectable (LANTUS) 6 Unit(s) SubCutaneous at bedtime  insulin glargine Injectable (LANTUS) 24 Unit(s) SubCutaneous every morning  insulin lispro (ADMELOG) corrective regimen sliding scale   SubCutaneous three times a day before meals  insulin lispro (ADMELOG) corrective regimen sliding scale   SubCutaneous at bedtime  levETIRAcetam 1000 milliGRAM(s) Oral two times a day  methylPREDNISolone sodium succinate Injectable 40 milliGRAM(s) IV Push daily  mexiletine 200 milliGRAM(s) Oral every 8 hours  montelukast 10 milliGRAM(s) Oral daily  multivitamin 1 Tablet(s) Oral daily  mupirocin 2% Nasal 1 Application(s) Both Nostrils two times a day  pantoprazole    Tablet 40 milliGRAM(s) Oral before breakfast  polyethylene glycol 3350 17 Gram(s) Oral daily  senna 2 Tablet(s) Oral at bedtime  tiotropium 2.5 MICROgram(s) Inhaler 2 Puff(s) Inhalation daily  valACYclovir 1000 milliGRAM(s) Oral two times a day    MEDICATIONS  (PRN):  dextrose Oral Gel 15 Gram(s) Oral once PRN Blood Glucose LESS THAN 70 milliGRAM(s)/deciliter  melatonin 3 milliGRAM(s) Oral at bedtime PRN Insomnia      HEALTH ISSUES - PROBLEM Dx:  Pneumonia    Severe asthma    H/O aortic dissection    Mild anemia    Type 2 diabetes mellitus    Atrial fibrillation    Seizure    HLD (hyperlipidemia)    Need for prophylactic measure

## 2024-10-11 NOTE — DIETITIAN INITIAL EVALUATION ADULT - NS FNS DIET ORDER
Diet, Regular:   Consistent Carbohydrate {No Snacks} (CSTCHO)  DASH/TLC {Sodium & Cholesterol Restricted} (DASH)  Supplement Feeding Modality:  Oral  Glucerna Shake Cans or Servings Per Day:  1       Frequency:  Daily (10-11-24 @ 11:01)

## 2024-10-11 NOTE — PHYSICAL THERAPY INITIAL EVALUATION ADULT - DIAGNOSIS, PT EVAL
How Severe Is Your Skin Cancer?: mild Is This A New Presentation, Or A Follow-Up?: Skin Lesion decreased functional mobility

## 2024-10-11 NOTE — PROGRESS NOTE ADULT - SUBJECTIVE AND OBJECTIVE BOX
The patient is a 74y Female complaining of difficult breathing    f/u pneumonia    Interval History/ROS:  no fever.  tolerating invanz.  some wheezing this morning.  Remainder of ROS otherwise negative.    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  H/O pelvic surgery 5 years ago - s/p fracture  History of tracheomalacia 2015 - attempted tracheal stenting ([facility])- course complicated by obstruction, respiratory failure, multiple CPR attempts -  stent discontinued; 10/20/2016 Tracheobronchoplasty (Prolene Mesh) performed at [facility] by Dr Zapien  S/P bronchoscopy 6/5/2018 - [place] (Dr Zapien) no evidence of tracheobronchomalacia in trachea or bronchial tubes  Rectal bleeding   S/P TAVR (transcatheter aortic valve replacement)  S/P aortic valve replacement    SOCIAL HISTORY:  -born in [place], [facility] and moved to [place] as a young child  -lives with daughter at home with health aides who visit the home for 12 hours a day   -no pets at home   Denied smoking/vaping/alcohol/recreational drug use    FAMILY HISTORY:  Family history of asthma  Family history of breast cancer (Sibling)  Family history of diabetes mellitus type II    Allergies  aspirin (Short breath)  OxyContin (Unknown)  Dilaudid (Short breath)  Valium (Short breath)  tetanus toxoid (Short breath)  ampicillin (Unknown)  cefepime (Anaphylaxis)  Avelox (Short breath; Pruritus)  shellfish (Anaphylaxis)  penicillin (Anaphylaxis)  codeine (Short breath)  Percocet (Unknown)  iodine (Short breath; Swelling)  meropenem (patient is not allergic, please remove from allergies)    ANTIMICROBIALS:  aztreonam  IVPB (10/9 x1)  levoFLOXacin  Tablet 750 every 24 hours (10/10 x1)    active:  ertapenem  IVPB 1000 every 24 hours (10/10-)  valACYclovir 1000 two times a day    MEDICATIONS  (STANDING):  albuterol    90 MICROgram(s) HFA Inhaler 2 every 6 hours  apixaban 5 every 12 hours  atorvastatin 20 at bedtime  clopidogrel Tablet 75 daily  diphenhydrAMINE 25 daily  fluticasone propionate/ salmeterol 250-50 MICROgram(s) Diskus 1 two times a day  gabapentin 100 every 8 hours  insulin glargine Injectable (LANTUS) 24 every morning  insulin glargine Injectable (LANTUS) 6 at bedtime  insulin lispro (ADMELOG) corrective regimen sliding scale  three times a day before meals  insulin lispro (ADMELOG) corrective regimen sliding scale  at bedtime  levETIRAcetam 1000 two times a day  methylPREDNISolone sodium succinate Injectable 40 daily  mexiletine 200 every 8 hours  montelukast 10 daily  pantoprazole    Tablet 40 before breakfast  polyethylene glycol 3350 17 daily  senna 2 at bedtime  tiotropium 2.5 MICROgram(s) Inhaler 2 daily    Vital Signs Last 24 Hrs  T(F): 98.1 (10-11-24 @ 09:09), Max: 98.5 (10-10-24 @ 23:57)  HR: 64 (10-11-24 @ 09:09)  BP: 149/81 (10-11-24 @ 09:09)  RR: 18 (10-11-24 @ 09:09)  SpO2: 93% (10-11-24 @ 09:09) (93% - 97%)  Wt(kg): --    PHYSICAL EXAM:  Constitutional: non-toxic, no distress  HEAD/EYES: anicteric  ENT:  supple  Cardiovascular:   normal S1, S2  Respiratory:  slight wheezing  GI:  soft, non-tender, normal bowel sounds +osteomy  :  no ramirez  Musculoskeletal:  no synovitis  Neurologic: awake and alert, normal strength, no focal findings  Skin:  no rash  Psychiatric:  awake, alert, appropriate mood                        10.5   8.25  )-----------( 318      ( 11 Oct 2024 10:23 )             35.3 10-11    139  |  104  |  12  ----------------------------<  294  4.5   |  24  |  0.62  Ca    8.9      11 Oct 2024 10:26Phos  2.0     10-11Mg     2.0     10-11  TPro  6.5  /  Alb  3.5  /  TBili  0.2  /  DBili  x   /  AST  13  /  ALT  15  /  AlkPhos  62  10-11    WBC Count: 8.25 (10-11-24 @ 10:23)  WBC Count: 10.09 (10-10-24 @ 09:19)  WBC Count: 14.89 (10-09-24 @ 10:07)    MICROBIOLOGY:  Culture - Sputum (collected 10-10-24 @ 11:15)  Source: .Sputum Sputum  Gram Stain (10-10-24 @ 19:23):    No polymorphonuclear leukocytes per low power field    No Squamous epithelial cells per low power field    No organisms seen per oil power field  Preliminary Report (10-11-24 @ 08:42):    Commensal val consistent with body site    Culture - Blood (collected 10-09-24 @ 09:56)  Source: .Blood BLOOD  Preliminary Report (10-10-24 @ 14:02):    No growth at 24 hours    Culture - Blood (collected 10-09-24 @ 09:50)  Source: .Blood BLOOD  Preliminary Report (10-10-24 @ 14:02):    No growth at 24 hours    Culture - Sputum (collected 02 Aug 2024 22:23)  Source: .Sputum Sputum  Final Report:    Normal Respiratory Val present    Culture - Urine (collected 02 Aug 2024 19:50)  Source: Clean Catch Clean Catch (Midstream)  Final Report:    10,000 - 49,000 CFU/mL Proteus mirabilis ESBL  Organism: Proteus mirabilis ESBL  Organism: Proteus mirabilis ESBL    Sensitivities:      Method Type: GARRETT      -  Ampicillin: R >16 These ampicillin results predict results for amoxicillin      -  Ampicillin/Sulbactam: S <=4/2      -  Aztreonam: R 16      -  Cefazolin: R >16 For uncomplicated UTI with K. pneumoniae, E. coli, or P. mirablis: GARRETT <=16 is sensitive and GARRETT >=32 is resistant. This also predicts results for oral agents cefaclor, cefdinir, cefpodoxime, cefprozil, cefuroxime axetil, cephalexin and locarbef for uncomplicated UTI. Note that some isolates may be susceptible to these agents while testing resistant to cefazolin.      -  Cefepime: R >16      -  Ceftriaxone: R >32      -  Cefuroxime: R >16      -  Ciprofloxacin: R >2      -  Ertapenem: S <=0.5      -  Gentamicin: R >8      -  Levofloxacin: R >4      -  Meropenem: S <=1      -  Nitrofurantoin: R >64 Should not be used to treat pyelonephritis      -  Piperacillin/Tazobactam: S <=8      -  Tobramycin: R >8      -  Trimethoprim/Sulfamethoxazole: R >2/38    Culture - Blood (collected 26 Apr 2024 00:19)  Source: .Blood Blood-Peripheral  Final Report:    No growth at 5 days    Culture - Blood (collected 26 Apr 2024 00:13)  Source: .Blood Blood-Peripheral  Final Report:    No growth at 5 days    Culture - Urine (collected 26 Apr 2024 00:01)  Source: Clean Catch Clean Catch (Midstream)  Final Report:    <10,000 CFU/mL Normal Urogenital Val    Culture - Blood (collected 25 Dec 2023 08:13)  Source: .Blood Blood  Final Report:    No growth at 5 days    Culture - Blood (collected 25 Dec 2023 08:08)  Source: .Blood Blood  Final Report:    No growth at 5 days    Culture - Blood (collected 24 Dec 2023 11:12)  Source: .Blood Blood-Peripheral  Final Report:    No growth at 5 days    Culture - Blood (collected 24 Dec 2023 11:09)  Source: .Blood Blood-Peripheral  Final Report:    No growth at 5 days    Culture - Blood (collected 21 Dec 2023 12:00)  Source: .Blood Blood-Peripheral  Final Report:    Growth in anaerobic bottle: Proteus mirabilis ESBL    Direct identification is available within approximately 3-5    hours either by Blood Panel Multiplexed PCR or Direct    MALDI-TOF. Details: [email or web address]  Organism: Blood Culture PCR  Proteus mirabilis ESBL  Organism: Proteus mirabilis ESBL    Sensitivities:      Method Type: GARRETT      -  Ampicillin: R >16 These ampicillin results predict results for amoxicillin      -  Ampicillin/Sulbactam: R 8/4      -  Aztreonam: R >16      -  Cefazolin: R >16      -  Cefepime: R >16      -  Ceftriaxone: R >32      -  Ciprofloxacin: R >2      -  Ertapenem: S <=0.5      -  Gentamicin: R >8      -  Levofloxacin: R >4      -  Meropenem: S <=1      -  Piperacillin/Tazobactam: R <=8      -  Tobramycin: R >8      -  Trimethoprim/Sulfamethoxazole: R >2/38  Organism: Blood Culture PCR    Sensitivities:      Method Type: PCR      -  Proteus species: Detec      -  ESBL: Detec      -  CTX-M Resistance Marker: Detec    HIV-1 RNA Quantitative, Viral Load: NOT DET. copies/mL (10-04-22 @ 00:31)  HIV-1 Viral Load Result: NOT DET. (10-04-22 @ 00:31)    RADIOLOGY:  below radiology personally reviewed and agree with finding    CT Chest No Cont (10.09.24 @ 13:18) >  Lungs/Airways/Pleura: Mild apical paraseptal emphysema. Trace left pleural effusion. No pneumothorax. Tracheal mucous secretions. Ill-defined nodular groundglass opacities are noted throughout both lungs (predominantly the upper lobes since prior segment lower lobes) in a peribronchovascular distribution, new from priorstudy. No cavitation. Mild diffuse bronchial wall thickening. Stable scarring in the lateral right middle and lower lobes  Mediastinum/Lymph nodes: New subcentimeter mediastinal nodes (i.e. subaortic 301:30), likely reactive.  Heart and Vessels: Status post ascending aortic replacement and TAVR. Known chronic thoracoabdominal dissection, as better evaluated on the prior contrast-enhanced CTA. Coronary arterial and mitral annular calcification. No pericardial effusion.  IMPRESSION:  New ill-defined peribronchovascular groundglass nodular opacities throughout both lungs, likely infectious. Follow-up chest CT in 8-12 weeks to resolution.      ECHO  TTE W or WO Ultrasound Enhancing Agent (07.03.24 @ 15:45) >  CONCLUSIONS:   1. Left ventricular cavity is normal in size. Left ventricular wall thickness is normal. Left ventricular systolic function is hyperdynamic with an ejection fraction of 75 % by Felpie's method of disks.   2. Normal right ventricular cavity size, with normal wall thickness, and normal right ventricular systolic function. Tricuspid annular plane systolic excursion (TAPSE) is 1.6 cm (normal >=1.7 cm).   3. No pericardial effusion seen.   4. Compared to the transthoracic echocardiogram performed on 9/10/2023, there have been no significant interval changes.   5. There is increased LV mass and concentric hypertrophy.

## 2024-10-11 NOTE — PROGRESS NOTE ADULT - PROBLEM SELECTOR PLAN 1
Presents with cough productive of yellow sputum, sore throat, and shortness of breath. Afebrile (Tmax 100.1 F) in the ED. Leukocytosis to 14.89. CT Chest revealed new ill-defined peribronchovascular groundglass nodular opacities   throughout both lungs, likely infectious. Sepsis given patient met SIRS criteria in the setting of pneumonia   -Per ID, start levofloxacin instead of aztreonam and cipro   -Blood cultures x2 obtained   -Sputum culture, legionella, strep ordered   -MRSA swab ordered   -Will trend WBC count daily   -Low threshold to start vancomycin for MRSA coverage given recent hospitalization if decompensates  -procalcitonin  -ID consult given patient on chronic ciprofloxacin at home, has a history of multiple allergies and multiple recurrent pneumonias Presents with cough productive of yellow sputum, sore throat, and shortness of breath. Afebrile (Tmax 100.1 F) in the ED. Leukocytosis to 14.89. CT Chest revealed new ill-defined peribronchovascular groundglass nodular opacities   throughout both lungs, likely infectious. Sepsis given patient met SIRS criteria in the setting of pneumonia   MRSA positive. Legionella, Strep, RVP negative. Sputum culture showed no growth. Blood culturesx2 negative   -Per ID, start ertapenem. Premedicate with benadryl prior to ertapenem per pt request   -Will trend WBC count daily   -Low threshold to start vancomycin for MRSA coverage given recent hospitalization if decompensates  -procalcitonin

## 2024-10-11 NOTE — PROGRESS NOTE ADULT - ATTENDING COMMENTS
74F PMH severe persistent asthma on chronic steroids and tezepelumab monthly, bronchiectasis, history of recurrent pneumonia, tracheomalacia s/p tracheoplasty, DM2, A-Fib on apixaban, Type A aortic dissection (s/p repair in 2023), s/p AVR (2022) and TAVR (valve in valve) in Sept 2023, h/o colorectal cancer s/p colectomy and ostomy, and seizures who presents with several days of dyspnea, sore throat, and cough productive of yellow sputum prompting hospitalization. She is adherent with her regimen of Breo Ellipta, Incruse Ellipta, methylprednisolone 8 mg daily, montelukast 10 mg qhs, and tezepelumab injections monthly. Pending to start Ohtuvayre. Found on CT chest to have bilateral ground glass nodular opacities with mucous secretions in trachea consistent with pneumonia. ABG without hypercapnia.     Presentation consistent with pneumonia with superimposed asthma/COPD exacerbation.    - Empiric antibiotics with ertapenem (quinolones held given history of chronic aortic dissection)  - Advair 250-50 mcg 1 inhalation twice daily, rinse after use  - Spiriva Respimat 2.5 mcg 2 inhalations once daily   - Albuterol nebs q6h  - Continue methylprednisolone 40 mg IV daily with plan to decrease by 10 mg every 3 days until she returns to home dose of methylprednisolone 8 mg daily  - Continue montelukast 10 mg daily  - Infectious workup with positive nasal MRSA, but presentation is not consistent with MRSA pneumonia. Can treat with nasal mupirocin  - Remaining infectious workup so far unrevealing, including mini-RVP, sputum culture, urine Legionella, urine Strep  - Resume monthly tezepelumab injections following discharge  - Will require repeat CT chest as outpatient to assess for resolution of opacities  - Close outpatient follow-up with Dr. Allison .

## 2024-10-11 NOTE — DIETITIAN INITIAL EVALUATION ADULT - NSICDXPASTSURGICALHX_GEN_ALL_CORE_FT
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)- course complicated by obstruction, respiratory failure, multiple CPR attempts -  stent discontinued; 10/20/2016 Tracheobronchoplasty (Prolene Mesh) performed at [facility] by Dr Zapien    Rectal bleeding exam under anesthesia ([facility]) 2/2018    S/P aortic valve replacement     S/P bronchoscopy 6/5/2018 - [place] (Dr Zapien) no evidence of tracheobronchomalacia in trachea or bronchial tubes    S/P TAVR (transcatheter aortic valve replacement)

## 2024-10-11 NOTE — PROGRESS NOTE ADULT - ASSESSMENT
Shirley Bloom is a 74 year old female with a past medical history of  type a aortic dissection status postrepair, status post TAVR, asthma on chronic steroids, bronchiectasis, recurrent pneumonia, tracheomalacia status post tracheoplasty, diabetes, A-fib on Eliquis, colorectal cancer status post colostomy who presents with shortness of breath, sore throat, and cough productive of yellow sputum for the past 2 days. CT Chest remarkable for new ill-defined peribronchovascular ground-glass nodular opacities throughout both lungs, likely infectious.

## 2024-10-11 NOTE — DIETITIAN INITIAL EVALUATION ADULT - ADD RECOMMEND
1) Continue  Consistent Carbohydrate, DASH/TLC therapeutic dietary restrictions   2) Continue Glucerna 1x/Day to augment PO intakes   3) Continue daily multivitamin and vitamin C supplements if no medical contraindications to aid in wound healing.   4) Encouraged PO intake as tolerated.   5) Monitor PO intake, GI tolerance, skin integrity and labs. RD remains available if needed, pt is aware.

## 2024-10-11 NOTE — PROGRESS NOTE ADULT - ASSESSMENT
74F with tracheomalacia s/p tracheoplasty, known COPD / bronchiectasis, asthma on chronic steroids (solumedrol 8mg daily), hx Proteus bacteremia secondary to aspiration pneumonia (+esbl), multiple drug allergies, hx adrenal insufficiency.  Recently admitted to [facility] with COPD exacerbation.  No antibiotics.  Now presenting with increased SOB.  No fever.  Briefly required supplemental O2.  Productive cough of yellow sputum.  BC and sputum culture sent.  Given a dose of aztreonam, levaquin.  Patient reports hx itching after ertapenem (no anaphylaxis) but better with benadryl.  No hx allergy to meropenem as documented in the chart.  CT done with b/l GGO / patchy opacities (not really new, seen on prior imaging as well).    COPD exacerbation with hypoxia, sputum production and leukocytosis  - tolerating ertapenem (give benadryl prior per patient request)  - can limit course to 5-7 days  - mrsa screen (+), however, no fever / improved leukocytosis  - if worsening clinical status or fever, can add vancomycin (would hold off for now)    Please call the ID service [phone] with questions or concerns over the weekend

## 2024-10-12 LAB
ALBUMIN SERPL ELPH-MCNC: 3.3 G/DL — SIGNIFICANT CHANGE UP (ref 3.3–5)
ALP SERPL-CCNC: 58 U/L — SIGNIFICANT CHANGE UP (ref 40–120)
ALT FLD-CCNC: 15 U/L — SIGNIFICANT CHANGE UP (ref 10–45)
ANION GAP SERPL CALC-SCNC: 11 MMOL/L — SIGNIFICANT CHANGE UP (ref 5–17)
AST SERPL-CCNC: 16 U/L — SIGNIFICANT CHANGE UP (ref 10–40)
BASOPHILS # BLD AUTO: 0.02 K/UL — SIGNIFICANT CHANGE UP (ref 0–0.2)
BASOPHILS NFR BLD AUTO: 0.2 % — SIGNIFICANT CHANGE UP (ref 0–2)
BILIRUB SERPL-MCNC: 0.2 MG/DL — SIGNIFICANT CHANGE UP (ref 0.2–1.2)
BUN SERPL-MCNC: 13 MG/DL — SIGNIFICANT CHANGE UP (ref 7–23)
CALCIUM SERPL-MCNC: 8.6 MG/DL — SIGNIFICANT CHANGE UP (ref 8.4–10.5)
CHLORIDE SERPL-SCNC: 106 MMOL/L — SIGNIFICANT CHANGE UP (ref 96–108)
CO2 SERPL-SCNC: 23 MMOL/L — SIGNIFICANT CHANGE UP (ref 22–31)
CREAT SERPL-MCNC: 0.66 MG/DL — SIGNIFICANT CHANGE UP (ref 0.5–1.3)
CULTURE RESULTS: SIGNIFICANT CHANGE UP
EGFR: 91 ML/MIN/1.73M2 — SIGNIFICANT CHANGE UP
EOSINOPHIL # BLD AUTO: 0.04 K/UL — SIGNIFICANT CHANGE UP (ref 0–0.5)
EOSINOPHIL NFR BLD AUTO: 0.4 % — SIGNIFICANT CHANGE UP (ref 0–6)
GLUCOSE BLDC GLUCOMTR-MCNC: 197 MG/DL — HIGH (ref 70–99)
GLUCOSE BLDC GLUCOMTR-MCNC: 205 MG/DL — HIGH (ref 70–99)
GLUCOSE BLDC GLUCOMTR-MCNC: 339 MG/DL — HIGH (ref 70–99)
GLUCOSE BLDC GLUCOMTR-MCNC: 348 MG/DL — HIGH (ref 70–99)
GLUCOSE SERPL-MCNC: 221 MG/DL — HIGH (ref 70–99)
HCT VFR BLD CALC: 33.8 % — LOW (ref 34.5–45)
HGB BLD-MCNC: 10.4 G/DL — LOW (ref 11.5–15.5)
IMM GRANULOCYTES NFR BLD AUTO: 0.9 % — SIGNIFICANT CHANGE UP (ref 0–0.9)
LYMPHOCYTES # BLD AUTO: 1.06 K/UL — SIGNIFICANT CHANGE UP (ref 1–3.3)
LYMPHOCYTES # BLD AUTO: 9.4 % — LOW (ref 13–44)
MAGNESIUM SERPL-MCNC: 1.9 MG/DL — SIGNIFICANT CHANGE UP (ref 1.6–2.6)
MCHC RBC-ENTMCNC: 22.3 PG — LOW (ref 27–34)
MCHC RBC-ENTMCNC: 30.8 GM/DL — LOW (ref 32–36)
MCV RBC AUTO: 72.5 FL — LOW (ref 80–100)
MONOCYTES # BLD AUTO: 0.45 K/UL — SIGNIFICANT CHANGE UP (ref 0–0.9)
MONOCYTES NFR BLD AUTO: 4 % — SIGNIFICANT CHANGE UP (ref 2–14)
NEUTROPHILS # BLD AUTO: 9.61 K/UL — HIGH (ref 1.8–7.4)
NEUTROPHILS NFR BLD AUTO: 85.1 % — HIGH (ref 43–77)
NRBC # BLD: 0 /100 WBCS — SIGNIFICANT CHANGE UP (ref 0–0)
PHOSPHATE SERPL-MCNC: 2.4 MG/DL — LOW (ref 2.5–4.5)
PLATELET # BLD AUTO: 232 K/UL — SIGNIFICANT CHANGE UP (ref 150–400)
POTASSIUM SERPL-MCNC: 4.5 MMOL/L — SIGNIFICANT CHANGE UP (ref 3.5–5.3)
POTASSIUM SERPL-SCNC: 4.5 MMOL/L — SIGNIFICANT CHANGE UP (ref 3.5–5.3)
PROT SERPL-MCNC: 6.2 G/DL — SIGNIFICANT CHANGE UP (ref 6–8.3)
RBC # BLD: 4.66 M/UL — SIGNIFICANT CHANGE UP (ref 3.8–5.2)
RBC # FLD: 18.6 % — HIGH (ref 10.3–14.5)
SODIUM SERPL-SCNC: 140 MMOL/L — SIGNIFICANT CHANGE UP (ref 135–145)
SPECIMEN SOURCE: SIGNIFICANT CHANGE UP
WBC # BLD: 11.28 K/UL — HIGH (ref 3.8–10.5)
WBC # FLD AUTO: 11.28 K/UL — HIGH (ref 3.8–10.5)

## 2024-10-12 PROCEDURE — 99232 SBSQ HOSP IP/OBS MODERATE 35: CPT

## 2024-10-12 RX ORDER — INSULIN LISPRO 100/ML
VIAL (ML) SUBCUTANEOUS AT BEDTIME
Refills: 0 | Status: DISCONTINUED | OUTPATIENT
Start: 2024-10-12 | End: 2024-10-15

## 2024-10-12 RX ORDER — INSULIN LISPRO 100/ML
VIAL (ML) SUBCUTANEOUS
Refills: 0 | Status: DISCONTINUED | OUTPATIENT
Start: 2024-10-12 | End: 2024-10-15

## 2024-10-12 RX ORDER — SOD PHOS DI, MONO/K PHOS MONO 250 MG
1 TABLET ORAL ONCE
Refills: 0 | Status: COMPLETED | OUTPATIENT
Start: 2024-10-12 | End: 2024-10-12

## 2024-10-12 RX ORDER — INSULIN GLARGINE 300 U/ML
8 INJECTION, SOLUTION SUBCUTANEOUS AT BEDTIME
Refills: 0 | Status: DISCONTINUED | OUTPATIENT
Start: 2024-10-12 | End: 2024-10-13

## 2024-10-12 RX ORDER — INSULIN GLARGINE 300 U/ML
28 INJECTION, SOLUTION SUBCUTANEOUS EVERY MORNING
Refills: 0 | Status: DISCONTINUED | OUTPATIENT
Start: 2024-10-12 | End: 2024-10-15

## 2024-10-12 RX ORDER — INSULIN LISPRO 100/ML
2 VIAL (ML) SUBCUTANEOUS
Refills: 0 | Status: DISCONTINUED | OUTPATIENT
Start: 2024-10-12 | End: 2024-10-13

## 2024-10-12 RX ADMIN — Medication 2 UNIT(S): at 17:28

## 2024-10-12 RX ADMIN — Medication 17 GRAM(S): at 12:01

## 2024-10-12 RX ADMIN — INSULIN GLARGINE 8 UNIT(S): 300 INJECTION, SOLUTION SUBCUTANEOUS at 21:33

## 2024-10-12 RX ADMIN — Medication 3 MILLIGRAM(S): at 21:33

## 2024-10-12 RX ADMIN — ATORVASTATIN CALCIUM 20 MILLIGRAM(S): 10 TABLET, FILM COATED ORAL at 21:33

## 2024-10-12 RX ADMIN — GABAPENTIN 100 MILLIGRAM(S): 800 TABLET, FILM COATED ORAL at 21:34

## 2024-10-12 RX ADMIN — PANTOPRAZOLE SODIUM 40 MILLIGRAM(S): 40 TABLET, DELAYED RELEASE ORAL at 06:04

## 2024-10-12 RX ADMIN — Medication 2 PUFF(S): at 12:01

## 2024-10-12 RX ADMIN — LACOSAMIDE 100 MILLIGRAM(S): 10 SOLUTION ORAL at 06:01

## 2024-10-12 RX ADMIN — APIXABAN 5 MILLIGRAM(S): 5 TABLET, FILM COATED ORAL at 17:37

## 2024-10-12 RX ADMIN — LACOSAMIDE 100 MILLIGRAM(S): 10 SOLUTION ORAL at 17:37

## 2024-10-12 RX ADMIN — VALACYCLOVIR 1000 MILLIGRAM(S): 1000 TABLET ORAL at 06:01

## 2024-10-12 RX ADMIN — Medication 4: at 13:17

## 2024-10-12 RX ADMIN — Medication 2 PUFF(S): at 00:41

## 2024-10-12 RX ADMIN — ERTAPENEM 120 MILLIGRAM(S): 1 INJECTION, POWDER, LYOPHILIZED, FOR SOLUTION INTRAMUSCULAR; INTRAVENOUS at 12:51

## 2024-10-12 RX ADMIN — Medication 1500 MILLIGRAM(S): at 17:38

## 2024-10-12 RX ADMIN — Medication 200 MILLIGRAM(S): at 14:29

## 2024-10-12 RX ADMIN — Medication 2 PUFF(S): at 06:03

## 2024-10-12 RX ADMIN — TIOTROPIUM BROMIDE INHALATION SPRAY 2 PUFF(S): 3.12 SPRAY, METERED RESPIRATORY (INHALATION) at 12:01

## 2024-10-12 RX ADMIN — SERTRALINE HYDROCHLORIDE 50 MILLIGRAM(S): 100 TABLET, FILM COATED ORAL at 12:02

## 2024-10-12 RX ADMIN — Medication 200 MILLIGRAM(S): at 21:33

## 2024-10-12 RX ADMIN — Medication 1: at 08:30

## 2024-10-12 RX ADMIN — VALACYCLOVIR 1000 MILLIGRAM(S): 1000 TABLET ORAL at 17:37

## 2024-10-12 RX ADMIN — Medication 325 MILLIGRAM(S): at 12:02

## 2024-10-12 RX ADMIN — GABAPENTIN 100 MILLIGRAM(S): 800 TABLET, FILM COATED ORAL at 06:02

## 2024-10-12 RX ADMIN — MONTELUKAST SODIUM 10 MILLIGRAM(S): 10 TABLET, FILM COATED ORAL at 12:01

## 2024-10-12 RX ADMIN — Medication 1 DOSE(S): at 17:29

## 2024-10-12 RX ADMIN — Medication 500 MILLIGRAM(S): at 12:00

## 2024-10-12 RX ADMIN — Medication 75 MILLIGRAM(S): at 12:00

## 2024-10-12 RX ADMIN — GABAPENTIN 100 MILLIGRAM(S): 800 TABLET, FILM COATED ORAL at 14:28

## 2024-10-12 RX ADMIN — Medication 1 DOSE(S): at 06:02

## 2024-10-12 RX ADMIN — Medication 1500 MILLIGRAM(S): at 06:02

## 2024-10-12 RX ADMIN — Medication 200 MILLIGRAM(S): at 06:01

## 2024-10-12 RX ADMIN — INSULIN GLARGINE 24 UNIT(S): 300 INJECTION, SOLUTION SUBCUTANEOUS at 08:30

## 2024-10-12 RX ADMIN — CHLORHEXIDINE GLUCONATE ORAL RINSE 1 APPLICATION(S): 1.2 SOLUTION DENTAL at 12:51

## 2024-10-12 RX ADMIN — MUPIROCIN 1 APPLICATION(S): 20 OINTMENT TOPICAL at 17:37

## 2024-10-12 RX ADMIN — Medication 25 MILLIGRAM(S): at 12:00

## 2024-10-12 RX ADMIN — Medication 1 PACKET(S): at 12:01

## 2024-10-12 RX ADMIN — LABETALOL HYDROCHLORIDE 100 MILLIGRAM(S): 200 TABLET, FILM COATED ORAL at 14:28

## 2024-10-12 RX ADMIN — METHYLPREDNISOLONE ACETATE 40 MILLIGRAM(S): 80 INJECTION, SUSPENSION INTRA-ARTICULAR; INTRALESIONAL; INTRAMUSCULAR; SOFT TISSUE at 06:01

## 2024-10-12 RX ADMIN — Medication 2 TABLET(S): at 21:33

## 2024-10-12 RX ADMIN — APIXABAN 5 MILLIGRAM(S): 5 TABLET, FILM COATED ORAL at 06:02

## 2024-10-12 RX ADMIN — Medication 2 PUFF(S): at 23:13

## 2024-10-12 RX ADMIN — MUPIROCIN 1 APPLICATION(S): 20 OINTMENT TOPICAL at 06:02

## 2024-10-12 RX ADMIN — Medication 2 PUFF(S): at 17:29

## 2024-10-12 RX ADMIN — LABETALOL HYDROCHLORIDE 100 MILLIGRAM(S): 200 TABLET, FILM COATED ORAL at 21:33

## 2024-10-12 RX ADMIN — Medication 30 MILLIGRAM(S): at 18:44

## 2024-10-12 RX ADMIN — MULTI VITAMIN/MINERAL SUPPLEMENT WITH ASCORBIC ACID, NIACIN, PYRIDOXINE, PANTOTHENIC ACID, FOLIC ACID, RIBOFLAVIN, THIAMIN, BIOTIN, COBALAMIN AND ZINC. 1 TABLET(S): 60; 20; 12.5; 10; 10; 1.7; 1.5; 1; .3; .006 TABLET, COATED ORAL at 12:00

## 2024-10-12 RX ADMIN — LABETALOL HYDROCHLORIDE 100 MILLIGRAM(S): 200 TABLET, FILM COATED ORAL at 06:01

## 2024-10-12 RX ADMIN — Medication 8: at 17:28

## 2024-10-12 NOTE — PROGRESS NOTE ADULT - PROBLEM SELECTOR PLAN 3
History of aortic dissection s/p repair and TAVR in 2023   History of HTN- on hydralazine, labetalol, nifedipine at home   On clopidogrel at home     -Start labetalol for blood pressure systolic in 140s and hx of aortic dissection, hold other home BP meds   -Continue clopidogrel Canceled

## 2024-10-12 NOTE — PROGRESS NOTE ADULT - PROBLEM SELECTOR PLAN 2
Home regimen: -ohtuvayre inhalation suspension, breo ellipta and incruse ellipta- not avaliable inpt. Also on methylpred, albuterol, duonebs, montelukast   -albuterol inhaler   -continue montelukast   -Pulmonary consulted, appreciate reccs     - Advair 250/50 bid, spiriva 2 puffs daily    - start solumedrol IV 40mg daily    - Please provide patient with incentive spirometer and acapella for airway clearance

## 2024-10-12 NOTE — PROGRESS NOTE ADULT - PROBLEM SELECTOR PLAN 5
On lantus 30 units in morning and 8 units in the evening. A1c 8.   -Continue 24 units in morning and 6 units nightly   -ISS

## 2024-10-12 NOTE — PROGRESS NOTE ADULT - NSPROGADDITIONALINFOA_GEN_ALL_CORE
Weekend Hospitalist Coverage summary   Date of hospital Admission 10/9/2024    74 year old female with PMHx of  type a aortic dissection status postrepair, status post TAVR in 2023, asthma on chronic steroids, bronchiectasis, recurrent pneumonia, tracheomalacia status post tracheoplasty, DM, A-fib on Eliquis, colorectal cancer status post colostomy, seizures, HTN, HLD who presented with shortness of breath, sore throat, and cough productive of yellow sputum for the past 2 days.  In the ED, Tmax is 100.1 F, blood pressure range between 100/68 to 111/53, RR rate in low 20s, saturating in low 90s on room air. Labs notable for leukocytosis, anemia to 10.1, proBNP of 1200, RVP negative. Patient was given  mL bolus and started on aztreonam antibiotic. Patient had a CT chest which revealed New ill-defined peribronchovascular groundglass nodular opacities throughout both lungs, likely infectious. Patient was then admitted with Pneumonia, severe Asthma and was seen by ID and Pulm teams   As reported , patient was feels short of breath on 10/11 and improved with nasal canula    #Pneumonia with superimposed asthma/COPD exacerbation ( as per Pulm team _     Cont to monitor SPO2 ( stable now )      Neg Blood, Sputums Cx and neg Urine Legionella and Strep Ag      Pt is on Ertapenem as directed by ID      Levaquin was DC due to Aortic Dissection ( as per pulm note)   F/UP Today's Labs     #hx of severe asthma    cont with current management      f/up Pulm rec      Monitor Spo2   CW  solumedrol 40mg IV daily (10/10 - present)--> monitor Blood Glucose ( with Fasting Glucose >180--> will F/up the rest of the Glucose today and adjust INSULIN based on Today's glucose ), cont with Protonix   Home hydralazine  (25mg q8hr), nifedipine ER (20mg daily)--> were held in the setting of relative hypotension--> monitor BP   CW with current labetolol 100 mg q8hr   pulm and ID are onboard, appreciate recs   F/up TOday's labs and Blood Glucose   rest of plan as above.    Izzy Mckeon   Hospitalist   available on TEAMS

## 2024-10-12 NOTE — PROGRESS NOTE ADULT - SUBJECTIVE AND OBJECTIVE BOX
DATE OF SERVICE: 10-12-24 @ 07:56    Patient is a 74y old  Female who presents with a chief complaint of Chart Reviewed, Events Noted  "74 year old female with a past medical history of  type a aortic dissection status postrepair, status post TAVR, asthma on chronic steroids, bronchiectasis, recurrent pneumonia, tracheomalacia status post tracheoplasty, diabetes, A-fib on Eliquis, colorectal cancer status post colostomy who presents with shortness of breath, sore throat, and cough productive of yellow sputum"    (11 Oct 2024 13:51)      INTERVAL/OVERNIGHT:    SUBJECTIVE:    MEDICATIONS  (STANDING):  albuterol    90 MICROgram(s) HFA Inhaler 2 Puff(s) Inhalation every 6 hours  apixaban 5 milliGRAM(s) Oral every 12 hours  ascorbic acid 500 milliGRAM(s) Oral daily  atorvastatin 20 milliGRAM(s) Oral at bedtime  chlorhexidine 2% Cloths 1 Application(s) Topical daily  clopidogrel Tablet 75 milliGRAM(s) Oral daily  dextrose 5%. 1000 milliLiter(s) (50 mL/Hr) IV Continuous <Continuous>  dextrose 5%. 1000 milliLiter(s) (100 mL/Hr) IV Continuous <Continuous>  dextrose 50% Injectable 25 Gram(s) IV Push once  dextrose 50% Injectable 12.5 Gram(s) IV Push once  dextrose 50% Injectable 25 Gram(s) IV Push once  diphenhydrAMINE 25 milliGRAM(s) Oral daily  ertapenem  IVPB 1000 milliGRAM(s) IV Intermittent every 24 hours  ferrous    sulfate 325 milliGRAM(s) Oral daily  fluticasone propionate/ salmeterol 250-50 MICROgram(s) Diskus 1 Dose(s) Inhalation two times a day  gabapentin 100 milliGRAM(s) Oral every 8 hours  glucagon  Injectable 1 milliGRAM(s) IntraMuscular once  insulin glargine Injectable (LANTUS) 24 Unit(s) SubCutaneous every morning  insulin glargine Injectable (LANTUS) 6 Unit(s) SubCutaneous at bedtime  insulin lispro (ADMELOG) corrective regimen sliding scale   SubCutaneous three times a day before meals  insulin lispro (ADMELOG) corrective regimen sliding scale   SubCutaneous at bedtime  labetalol 100 milliGRAM(s) Oral three times a day  lacosamide 100 milliGRAM(s) Oral two times a day  levETIRAcetam 1500 milliGRAM(s) Oral two times a day  methylPREDNISolone sodium succinate Injectable 40 milliGRAM(s) IV Push daily  mexiletine 200 milliGRAM(s) Oral every 8 hours  montelukast 10 milliGRAM(s) Oral daily  multivitamin 1 Tablet(s) Oral daily  mupirocin 2% Nasal 1 Application(s) Both Nostrils two times a day  pantoprazole    Tablet 40 milliGRAM(s) Oral before breakfast  polyethylene glycol 3350 17 Gram(s) Oral daily  senna 2 Tablet(s) Oral at bedtime  sertraline 50 milliGRAM(s) Oral daily  tiotropium 2.5 MICROgram(s) Inhaler 2 Puff(s) Inhalation daily  valACYclovir 1000 milliGRAM(s) Oral two times a day    MEDICATIONS  (PRN):  albuterol/ipratropium for Nebulization 3 milliLiter(s) Nebulizer every 6 hours PRN Wheezing  dextrose Oral Gel 15 Gram(s) Oral once PRN Blood Glucose LESS THAN 70 milliGRAM(s)/deciliter  melatonin 3 milliGRAM(s) Oral at bedtime PRN Insomnia      Vital Signs Last 24 Hrs  T(C): 36.9 (12 Oct 2024 00:48), Max: 36.9 (11 Oct 2024 16:51)  T(F): 98.5 (12 Oct 2024 00:48), Max: 98.5 (11 Oct 2024 16:51)  HR: 56 (12 Oct 2024 00:48) (56 - 71)  BP: 145/73 (12 Oct 2024 00:48) (145/73 - 149/81)  BP(mean): --  RR: 18 (12 Oct 2024 00:48) (17 - 18)  SpO2: 95% (12 Oct 2024 00:48) (93% - 98%)    Parameters below as of 12 Oct 2024 00:48  Patient On (Oxygen Delivery Method): room air      CAPILLARY BLOOD GLUCOSE      POCT Blood Glucose.: 147 mg/dL (11 Oct 2024 21:32)  POCT Blood Glucose.: 303 mg/dL (11 Oct 2024 17:21)  POCT Blood Glucose.: 270 mg/dL (11 Oct 2024 11:50)  POCT Blood Glucose.: 262 mg/dL (11 Oct 2024 08:15)    I&O's Summary    11 Oct 2024 07:01  -  12 Oct 2024 07:00  --------------------------------------------------------  IN: 0 mL / OUT: 500 mL / NET: -500 mL        PHYSICAL EXAM:  GENERAL: NAD,  HEAD:  Atraumatic, Normocephalic  EYES: anicteric  NECK: Supple, No JVD  CHEST/LUNG: Clear to auscultation bilaterally; No wheeze  HEART: Regular rate and rhythm; No murmurs, rubs, or gallops  ABDOMEN: Soft, Nontender, Nondistended  EXTREMITIES: No b/l LE edema  NEUROLOGY: A&Ox3, non-focal  SKIN: No rashes or lesions    LABS:                        10.5   8.25  )-----------( 318      ( 11 Oct 2024 10:23 )             35.3     10-11    139  |  104  |  12  ----------------------------<  294[H]  4.5   |  24  |  0.62    Ca    8.9      11 Oct 2024 10:26  Phos  2.0     10-11  Mg     2.0     10-11    TPro  6.5  /  Alb  3.5  /  TBili  0.2  /  DBili  x   /  AST  13  /  ALT  15  /  AlkPhos  62  10-11          Urinalysis Basic - ( 11 Oct 2024 10:26 )    Color: x / Appearance: x / SG: x / pH: x  Gluc: 294 mg/dL / Ketone: x  / Bili: x / Urobili: x   Blood: x / Protein: x / Nitrite: x   Leuk Esterase: x / RBC: x / WBC x   Sq Epi: x / Non Sq Epi: x / Bacteria: x        RADIOLOGY & ADDITIONAL TESTS:    Imaging Personally Reviewed:    Consultant(s) Notes Reviewed:      Care Discussed with Consultants/Other Providers:

## 2024-10-13 LAB
ACANTHOCYTES BLD QL SMEAR: SLIGHT — SIGNIFICANT CHANGE UP
ALBUMIN SERPL ELPH-MCNC: 3.6 G/DL — SIGNIFICANT CHANGE UP (ref 3.3–5)
ALP SERPL-CCNC: 60 U/L — SIGNIFICANT CHANGE UP (ref 40–120)
ALT FLD-CCNC: 15 U/L — SIGNIFICANT CHANGE UP (ref 10–45)
ANION GAP SERPL CALC-SCNC: 9 MMOL/L — SIGNIFICANT CHANGE UP (ref 5–17)
ANISOCYTOSIS BLD QL: SLIGHT — SIGNIFICANT CHANGE UP
AST SERPL-CCNC: 16 U/L — SIGNIFICANT CHANGE UP (ref 10–40)
BASOPHILS # BLD AUTO: 0 K/UL — SIGNIFICANT CHANGE UP (ref 0–0.2)
BASOPHILS NFR BLD AUTO: 0 % — SIGNIFICANT CHANGE UP (ref 0–2)
BILIRUB SERPL-MCNC: 0.2 MG/DL — SIGNIFICANT CHANGE UP (ref 0.2–1.2)
BUN SERPL-MCNC: 14 MG/DL — SIGNIFICANT CHANGE UP (ref 7–23)
BURR CELLS BLD QL SMEAR: PRESENT — SIGNIFICANT CHANGE UP
CALCIUM SERPL-MCNC: 8.9 MG/DL — SIGNIFICANT CHANGE UP (ref 8.4–10.5)
CHLORIDE SERPL-SCNC: 104 MMOL/L — SIGNIFICANT CHANGE UP (ref 96–108)
CO2 SERPL-SCNC: 26 MMOL/L — SIGNIFICANT CHANGE UP (ref 22–31)
CREAT SERPL-MCNC: 0.64 MG/DL — SIGNIFICANT CHANGE UP (ref 0.5–1.3)
DACRYOCYTES BLD QL SMEAR: SLIGHT — SIGNIFICANT CHANGE UP
EGFR: 92 ML/MIN/1.73M2 — SIGNIFICANT CHANGE UP
ELLIPTOCYTES BLD QL SMEAR: SLIGHT — SIGNIFICANT CHANGE UP
EOSINOPHIL # BLD AUTO: 0.11 K/UL — SIGNIFICANT CHANGE UP (ref 0–0.5)
EOSINOPHIL NFR BLD AUTO: 0.9 % — SIGNIFICANT CHANGE UP (ref 0–6)
GLUCOSE BLDC GLUCOMTR-MCNC: 200 MG/DL — HIGH (ref 70–99)
GLUCOSE BLDC GLUCOMTR-MCNC: 203 MG/DL — HIGH (ref 70–99)
GLUCOSE BLDC GLUCOMTR-MCNC: 238 MG/DL — HIGH (ref 70–99)
GLUCOSE BLDC GLUCOMTR-MCNC: 287 MG/DL — HIGH (ref 70–99)
GLUCOSE SERPL-MCNC: 231 MG/DL — HIGH (ref 70–99)
HCT VFR BLD CALC: 35.6 % — SIGNIFICANT CHANGE UP (ref 34.5–45)
HGB BLD-MCNC: 10.6 G/DL — LOW (ref 11.5–15.5)
HYPOCHROMIA BLD QL: SLIGHT — SIGNIFICANT CHANGE UP
LYMPHOCYTES # BLD AUTO: 1.14 K/UL — SIGNIFICANT CHANGE UP (ref 1–3.3)
LYMPHOCYTES # BLD AUTO: 9.6 % — LOW (ref 13–44)
MACROCYTES BLD QL: SLIGHT — SIGNIFICANT CHANGE UP
MAGNESIUM SERPL-MCNC: 1.9 MG/DL — SIGNIFICANT CHANGE UP (ref 1.6–2.6)
MANUAL SMEAR VERIFICATION: SIGNIFICANT CHANGE UP
MCHC RBC-ENTMCNC: 21.7 PG — LOW (ref 27–34)
MCHC RBC-ENTMCNC: 29.8 GM/DL — LOW (ref 32–36)
MCV RBC AUTO: 72.8 FL — LOW (ref 80–100)
MONOCYTES # BLD AUTO: 0.42 K/UL — SIGNIFICANT CHANGE UP (ref 0–0.9)
MONOCYTES NFR BLD AUTO: 3.5 % — SIGNIFICANT CHANGE UP (ref 2–14)
NEUTROPHILS # BLD AUTO: 10.25 K/UL — HIGH (ref 1.8–7.4)
NEUTROPHILS NFR BLD AUTO: 86 % — HIGH (ref 43–77)
OVALOCYTES BLD QL SMEAR: SLIGHT — SIGNIFICANT CHANGE UP
PHOSPHATE SERPL-MCNC: 2.5 MG/DL — SIGNIFICANT CHANGE UP (ref 2.5–4.5)
PLAT MORPH BLD: NORMAL — SIGNIFICANT CHANGE UP
PLATELET # BLD AUTO: 363 K/UL — SIGNIFICANT CHANGE UP (ref 150–400)
POIKILOCYTOSIS BLD QL AUTO: SIGNIFICANT CHANGE UP
POLYCHROMASIA BLD QL SMEAR: SLIGHT — SIGNIFICANT CHANGE UP
POTASSIUM SERPL-MCNC: 4.4 MMOL/L — SIGNIFICANT CHANGE UP (ref 3.5–5.3)
POTASSIUM SERPL-SCNC: 4.4 MMOL/L — SIGNIFICANT CHANGE UP (ref 3.5–5.3)
PROT SERPL-MCNC: 6.3 G/DL — SIGNIFICANT CHANGE UP (ref 6–8.3)
RBC # BLD: 4.89 M/UL — SIGNIFICANT CHANGE UP (ref 3.8–5.2)
RBC # FLD: 18.4 % — HIGH (ref 10.3–14.5)
RBC BLD AUTO: ABNORMAL
SCHISTOCYTES BLD QL AUTO: SLIGHT — SIGNIFICANT CHANGE UP
SODIUM SERPL-SCNC: 139 MMOL/L — SIGNIFICANT CHANGE UP (ref 135–145)
TARGETS BLD QL SMEAR: SLIGHT — SIGNIFICANT CHANGE UP
WBC # BLD: 11.92 K/UL — HIGH (ref 3.8–10.5)
WBC # FLD AUTO: 11.92 K/UL — HIGH (ref 3.8–10.5)

## 2024-10-13 PROCEDURE — 99232 SBSQ HOSP IP/OBS MODERATE 35: CPT

## 2024-10-13 RX ORDER — INSULIN LISPRO 100/ML
4 VIAL (ML) SUBCUTANEOUS
Refills: 0 | Status: DISCONTINUED | OUTPATIENT
Start: 2024-10-13 | End: 2024-10-15

## 2024-10-13 RX ORDER — BENZONATATE 150 MG/1
100 CAPSULE ORAL EVERY 8 HOURS
Refills: 0 | Status: DISCONTINUED | OUTPATIENT
Start: 2024-10-13 | End: 2024-10-15

## 2024-10-13 RX ORDER — INSULIN GLARGINE 300 U/ML
10 INJECTION, SOLUTION SUBCUTANEOUS AT BEDTIME
Refills: 0 | Status: DISCONTINUED | OUTPATIENT
Start: 2024-10-13 | End: 2024-10-15

## 2024-10-13 RX ADMIN — Medication 6: at 17:44

## 2024-10-13 RX ADMIN — APIXABAN 5 MILLIGRAM(S): 5 TABLET, FILM COATED ORAL at 17:50

## 2024-10-13 RX ADMIN — TIOTROPIUM BROMIDE INHALATION SPRAY 2 PUFF(S): 3.12 SPRAY, METERED RESPIRATORY (INHALATION) at 12:24

## 2024-10-13 RX ADMIN — Medication 1 DOSE(S): at 17:50

## 2024-10-13 RX ADMIN — Medication 17 GRAM(S): at 12:19

## 2024-10-13 RX ADMIN — PANTOPRAZOLE SODIUM 40 MILLIGRAM(S): 40 TABLET, DELAYED RELEASE ORAL at 05:59

## 2024-10-13 RX ADMIN — Medication 2 PUFF(S): at 23:14

## 2024-10-13 RX ADMIN — Medication 500 MILLIGRAM(S): at 12:14

## 2024-10-13 RX ADMIN — MUPIROCIN 1 APPLICATION(S): 20 OINTMENT TOPICAL at 17:51

## 2024-10-13 RX ADMIN — METHYLPREDNISOLONE ACETATE 40 MILLIGRAM(S): 80 INJECTION, SUSPENSION INTRA-ARTICULAR; INTRALESIONAL; INTRAMUSCULAR; SOFT TISSUE at 06:00

## 2024-10-13 RX ADMIN — Medication 2 PUFF(S): at 17:50

## 2024-10-13 RX ADMIN — LABETALOL HYDROCHLORIDE 100 MILLIGRAM(S): 200 TABLET, FILM COATED ORAL at 12:17

## 2024-10-13 RX ADMIN — Medication 4: at 12:13

## 2024-10-13 RX ADMIN — Medication 2 TABLET(S): at 21:23

## 2024-10-13 RX ADMIN — Medication 2 UNIT(S): at 17:44

## 2024-10-13 RX ADMIN — LACOSAMIDE 100 MILLIGRAM(S): 10 SOLUTION ORAL at 05:59

## 2024-10-13 RX ADMIN — APIXABAN 5 MILLIGRAM(S): 5 TABLET, FILM COATED ORAL at 06:00

## 2024-10-13 RX ADMIN — Medication 1500 MILLIGRAM(S): at 06:00

## 2024-10-13 RX ADMIN — Medication 25 MILLIGRAM(S): at 12:23

## 2024-10-13 RX ADMIN — Medication 325 MILLIGRAM(S): at 12:15

## 2024-10-13 RX ADMIN — Medication 2 UNIT(S): at 12:12

## 2024-10-13 RX ADMIN — LACOSAMIDE 100 MILLIGRAM(S): 10 SOLUTION ORAL at 17:50

## 2024-10-13 RX ADMIN — Medication 1 DOSE(S): at 06:02

## 2024-10-13 RX ADMIN — MONTELUKAST SODIUM 10 MILLIGRAM(S): 10 TABLET, FILM COATED ORAL at 12:15

## 2024-10-13 RX ADMIN — LABETALOL HYDROCHLORIDE 100 MILLIGRAM(S): 200 TABLET, FILM COATED ORAL at 21:23

## 2024-10-13 RX ADMIN — LABETALOL HYDROCHLORIDE 100 MILLIGRAM(S): 200 TABLET, FILM COATED ORAL at 06:00

## 2024-10-13 RX ADMIN — GABAPENTIN 100 MILLIGRAM(S): 800 TABLET, FILM COATED ORAL at 21:23

## 2024-10-13 RX ADMIN — SERTRALINE HYDROCHLORIDE 50 MILLIGRAM(S): 100 TABLET, FILM COATED ORAL at 12:16

## 2024-10-13 RX ADMIN — Medication 1500 MILLIGRAM(S): at 17:49

## 2024-10-13 RX ADMIN — MUPIROCIN 1 APPLICATION(S): 20 OINTMENT TOPICAL at 06:01

## 2024-10-13 RX ADMIN — VALACYCLOVIR 1000 MILLIGRAM(S): 1000 TABLET ORAL at 06:00

## 2024-10-13 RX ADMIN — Medication 2 PUFF(S): at 06:02

## 2024-10-13 RX ADMIN — ERTAPENEM 120 MILLIGRAM(S): 1 INJECTION, POWDER, LYOPHILIZED, FOR SOLUTION INTRAMUSCULAR; INTRAVENOUS at 12:20

## 2024-10-13 RX ADMIN — Medication 75 MILLIGRAM(S): at 12:16

## 2024-10-13 RX ADMIN — INSULIN GLARGINE 28 UNIT(S): 300 INJECTION, SOLUTION SUBCUTANEOUS at 08:28

## 2024-10-13 RX ADMIN — GABAPENTIN 100 MILLIGRAM(S): 800 TABLET, FILM COATED ORAL at 12:16

## 2024-10-13 RX ADMIN — Medication 200 MILLIGRAM(S): at 12:15

## 2024-10-13 RX ADMIN — ATORVASTATIN CALCIUM 20 MILLIGRAM(S): 10 TABLET, FILM COATED ORAL at 21:23

## 2024-10-13 RX ADMIN — Medication 2: at 08:29

## 2024-10-13 RX ADMIN — Medication 2 PUFF(S): at 12:24

## 2024-10-13 RX ADMIN — INSULIN GLARGINE 10 UNIT(S): 300 INJECTION, SOLUTION SUBCUTANEOUS at 21:23

## 2024-10-13 RX ADMIN — Medication 2 UNIT(S): at 08:28

## 2024-10-13 RX ADMIN — Medication 200 MILLIGRAM(S): at 06:01

## 2024-10-13 RX ADMIN — CHLORHEXIDINE GLUCONATE ORAL RINSE 1 APPLICATION(S): 1.2 SOLUTION DENTAL at 17:55

## 2024-10-13 RX ADMIN — Medication 3 MILLIGRAM(S): at 21:23

## 2024-10-13 RX ADMIN — MULTI VITAMIN/MINERAL SUPPLEMENT WITH ASCORBIC ACID, NIACIN, PYRIDOXINE, PANTOTHENIC ACID, FOLIC ACID, RIBOFLAVIN, THIAMIN, BIOTIN, COBALAMIN AND ZINC. 1 TABLET(S): 60; 20; 12.5; 10; 10; 1.7; 1.5; 1; .3; .006 TABLET, COATED ORAL at 12:18

## 2024-10-13 RX ADMIN — GABAPENTIN 100 MILLIGRAM(S): 800 TABLET, FILM COATED ORAL at 05:59

## 2024-10-13 RX ADMIN — Medication 200 MILLIGRAM(S): at 21:23

## 2024-10-13 RX ADMIN — VALACYCLOVIR 1000 MILLIGRAM(S): 1000 TABLET ORAL at 17:50

## 2024-10-13 NOTE — PROGRESS NOTE ADULT - PROBLEM SELECTOR PLAN 3
History of aortic dissection s/p repair and TAVR in 2023   History of HTN- on hydralazine, labetalol, nifedipine at home   On clopidogrel at home     -C/w home nifedipine and labetalol given elevated BPs and hx of aortic dissection, hold other home BP meds   -Continue clopidogrel History of aortic dissection s/p repair and TAVR in 2023   History of HTN- on hydralazine, labetalol, nifedipine at home   On clopidogrel at home     -C/w home nifedipine and labetalol given hx of aortic dissection, hold other home BP meds   -Continue clopidogrel

## 2024-10-13 NOTE — PROGRESS NOTE ADULT - ATTENDING COMMENTS
Weekend Hospitalist Coverage summary   Date of hospital Admission 10/9/2024    74 year old female with PMHx of  type A aortic dissection status postrepair, status post TAVR in 2023, asthma on chronic steroids, bronchiectasis, recurrent pneumonia, tracheomalacia status post tracheoplasty, DM, A-fib on Eliquis, colorectal cancer status post colostomy, seizures, HTN, HLD who presented with shortness of breath, sore throat, and cough productive of yellow sputum for the past 2 days.  In the ED, Tmax is 100.1 F, blood pressure range between 100/68 to 111/53, RR rate in low 20s, saturating in low 90s on room air. Labs notable for leukocytosis, anemia to 10.1, proBNP of 1200, RVP negative. Patient was given  mL bolus and started on aztreonam antibiotic. Patient had a CT chest which revealed New ill-defined peribronchovascular groundglass nodular opacities throughout both lungs, likely infectious. Patient was then admitted with Pneumonia, severe Asthma and was seen by ID and Pulm teams   As reported , patient was feels short of breath on 10/11 and improved with nasal canula    #Pneumonia ( likely bacterial ) in patient asthma/COPD with exacerbation ( as per Pulm team)      Cont to monitor SPO2      Neg Blood, Sputums Cx and neg Urine Legionella and Strep Ag      Pt is on Ertapenem as directed by ID      Levaquin was DC due to Aortic Dissection ( as per pulm note)      Monitor SPO2 and will likley need oupt repeat Chest CT        pulm and ID are onboard, appreciate recs    #hx of severe asthma/COPD    cont with current management      f/up Pulm rec      Monitor Spo2   CW  solumedrol 40mg IV daily (10/10 - present)--> monitor Blood Glucose cont with Protonix--> f/up with Pulm for possible change to oral from IV   cont to titrate insulin while on the steroid   Discuss with the medical resident   rest of plan as above.    Izzy Mckeon   Hospitalist   available on TEAMS

## 2024-10-13 NOTE — PROGRESS NOTE ADULT - SUBJECTIVE AND OBJECTIVE BOX
Patient is a 74y old  Female who presents with a chief complaint of shortness of breath (12 Oct 2024 07:56)    Being followed by ID for COPD exacerbation, Pneumonia    Interval history:  No acute events      ROS:  No cough, SOB, CP  No N/V/D./abd pain  No other complaints      Antimicrobials:    ertapenem  IVPB 1000 milliGRAM(s) IV Intermittent every 24 hours  valACYclovir 1000 milliGRAM(s) Oral two times a day      Vital Signs Last 24 Hrs  T(C): 36.8 (10-13-24 @ 05:55), Max: 36.8 (10-13-24 @ 05:55)  T(F): 98.2 (10-13-24 @ 05:55), Max: 98.2 (10-13-24 @ 05:55)  HR: 56 (10-13-24 @ 05:55) (55 - 70)  BP: 137/74 (10-13-24 @ 05:55) (114/66 - 172/80)  BP(mean): --  RR: 18 (10-13-24 @ 05:55) (18 - 18)  SpO2: 99% (10-13-24 @ 05:55) (94% - 99%)    Physical Exam:    Constitutional well preserved, NAD    HEENT EOMI,No pallor or icterus    No oral exudate or erythema    Neck supple no LN    Chest Clear to auscultation    CVS S1 S2 WNl No murmur or rub or gallop    Abd soft BS normal No tenderness no masses    Ext No cyanosis clubbing or edema    IV site no erythema tenderness or discharge    Joints no swelling or LOM    CNS AAO X 3 non focal    Lab Data:                          10.4   11.28 )-----------( 232      ( 12 Oct 2024 09:24 )             33.8       10-12    140  |  106  |  13  ----------------------------<  221[H]  4.5   |  23  |  0.66    Ca    8.6      12 Oct 2024 09:25  Phos  2.4     10-12  Mg     1.9     10-12    TPro  6.2  /  Alb  3.3  /  TBili  0.2  /  DBili  x   /  AST  16  /  ALT  15  /  AlkPhos  58  10-12        .Sputum Sputum  10-10-24   Commensal val consistent with body site  --    No polymorphonuclear leukocytes per low power field  No Squamous epithelial cells per low power field  No organisms seen per oil power field      .Blood BLOOD  10-09-24   No growth at 72 Hours  --  --      .Blood BLOOD  10-09-24   No growth at 72 Hours  --  --      < from: CT Chest No Cont (10.09.24 @ 13:18) >  ACC: [id number] EXAM:  CT CHEST   ORDERED BY:  MEHRDAD LIU     PROCEDURE DATE:  10/09/2024          INTERPRETATION:  INDICATION: Evaluate for infection    TECHNIQUE: A volumetric CT acquisition of the chest was obtained from the   thoracic inlet to the upper abdomen without the use of intravenous   contrast. Coronal and sagittal reconstructed images are provided.    COMPARISON: Chest CTA 8/4/2024    FINDINGS:    Lungs/Airways/Pleura: Mild apical paraseptal emphysema. Trace left   pleural effusion. No pneumothorax. Tracheal mucous secretions.   Ill-defined nodular groundglass opacities are noted throughout both lungs   (predominantly the upper lobes since prior segment lower lobes) in a   peribronchovascular distribution, new from priorstudy. No cavitation.   Mild diffuse bronchial wall thickening. Stable scarring in the lateral   right middle and lower lobes    Mediastinum/Lymph nodes: New subcentimeter mediastinal nodes (i.e.   subaortic 301:30), likely reactive.    Heart and Vessels: Status post ascending aortic replacement and TAVR.   Known chronic thoracoabdominal dissection, as better evaluated on the   prior contrast-enhanced CTA. Coronary arterial and mitral annular   calcification. No pericardial effusion.    Upper Abdomen: Unremarkable.    Osseous structures and Soft Tissues: Sternotomy.    IMPRESSION:  New ill-defined peribronchovascular groundglass nodular opacities   throughout both lungs, likely infectious. Follow-up chest CT in 8-12   weeks to resolution.    < end of copied text >                     Patient is a 74y old  Female who presents with a chief complaint of shortness of breath (12 Oct 2024 07:56)    Being followed by ID for COPD exacerbation, Pneumonia    Interval history:  No acute events      ROS: "Rattle in chest", a bit improved  No cough, SOB, CP  No N/V/D./abd pain  No other complaints      Antimicrobials:    ertapenem  IVPB 1000 milliGRAM(s) IV Intermittent every 24 hours  valACYclovir 1000 milliGRAM(s) Oral two times a day      Vital Signs Last 24 Hrs  T(C): 36.8 (10-13-24 @ 05:55), Max: 36.8 (10-13-24 @ 05:55)  T(F): 98.2 (10-13-24 @ 05:55), Max: 98.2 (10-13-24 @ 05:55)  HR: 56 (10-13-24 @ 05:55) (55 - 70)  BP: 137/74 (10-13-24 @ 05:55) (114/66 - 172/80)  BP(mean): --  RR: 18 (10-13-24 @ 05:55) (18 - 18)  SpO2: 99% (10-13-24 @ 05:55) (94% - 99%)    Physical Exam:    Constitutional well preserved, NAD, Cushingoid appearance    HEENT EOMI, No pallor or icterus    No oral exudate or erythema    Neck supple no LN    Chest Scattered rhonchi    CVS S1 S2 WNl No murmur or rub or gallop    Abd soft BS normal No tenderness no masses, Colostomy    Ext No cyanosis clubbing or edema    IV site no erythema tenderness or discharge    Joints no swelling or LOM    CNS AAO X 3 non focal    Lab Data:                          10.4   11.28 )-----------( 232      ( 12 Oct 2024 09:24 )             33.8       10-12    140  |  106  |  13  ----------------------------<  221[H]  4.5   |  23  |  0.66    Ca    8.6      12 Oct 2024 09:25  Phos  2.4     10-12  Mg     1.9     10-12    TPro  6.2  /  Alb  3.3  /  TBili  0.2  /  DBili  x   /  AST  16  /  ALT  15  /  AlkPhos  58  10-12        .Sputum Sputum  10-10-24   Commensal val consistent with body site  --    No polymorphonuclear leukocytes per low power field  No Squamous epithelial cells per low power field  No organisms seen per oil power field      .Blood BLOOD  10-09-24   No growth at 72 Hours  --  --      .Blood BLOOD  10-09-24   No growth at 72 Hours  --  --      < from: CT Chest No Cont (10.09.24 @ 13:18) >  ACC: [id number] EXAM:  CT CHEST   ORDERED BY:  MEHRDAD LIU     PROCEDURE DATE:  10/09/2024          INTERPRETATION:  INDICATION: Evaluate for infection    TECHNIQUE: A volumetric CT acquisition of the chest was obtained from the   thoracic inlet to the upper abdomen without the use of intravenous   contrast. Coronal and sagittal reconstructed images are provided.    COMPARISON: Chest CTA 8/4/2024    FINDINGS:    Lungs/Airways/Pleura: Mild apical paraseptal emphysema. Trace left   pleural effusion. No pneumothorax. Tracheal mucous secretions.   Ill-defined nodular groundglass opacities are noted throughout both lungs   (predominantly the upper lobes since prior segment lower lobes) in a   peribronchovascular distribution, new from priorstudy. No cavitation.   Mild diffuse bronchial wall thickening. Stable scarring in the lateral   right middle and lower lobes    Mediastinum/Lymph nodes: New subcentimeter mediastinal nodes (i.e.   subaortic 301:30), likely reactive.    Heart and Vessels: Status post ascending aortic replacement and TAVR.   Known chronic thoracoabdominal dissection, as better evaluated on the   prior contrast-enhanced CTA. Coronary arterial and mitral annular   calcification. No pericardial effusion.    Upper Abdomen: Unremarkable.    Osseous structures and Soft Tissues: Sternotomy.    IMPRESSION:  New ill-defined peribronchovascular groundglass nodular opacities   throughout both lungs, likely infectious. Follow-up chest CT in 8-12   weeks to resolution.    < end of copied text >

## 2024-10-13 NOTE — PROGRESS NOTE ADULT - PROBLEM SELECTOR PLAN 2
Home regimen: -ohtuvayre inhalation suspension, breo ellipta and incruse ellipta- not avaliable inpt. Also on methylpred, albuterol, duonebs, montelukast   -albuterol inhaler   -continue montelukast   -Pulmonary consulted, appreciate reccs     - Advair 250/50 bid, spiriva 2 puffs daily    - start solumedrol IV 40mg daily    -incentive spirometer and acapella for airway clearance Home regimen: -ohtuvayre inhalation suspension, breo ellipta and incruse ellipta- not avaliable inpt. Also on methylpred, albuterol, duonebs, montelukast   -albuterol inhaler   -continue montelukast   -Pulmonary consulted, appreciate reccs     - Advair 250/50 bid, spiriva 2 puffs daily    - CW solumedrol IV 40mg daily    -incentive spirometer and acapella for airway clearance

## 2024-10-13 NOTE — PROGRESS NOTE ADULT - SUBJECTIVE AND OBJECTIVE BOX
RAYRAY RODRIGUEZ  74y  Female    Complaints:  Subjective:     No acute o/n events. Pt denies subj fevers/chills, HA, dizziness, chest pain, palpitations, n/v, abd pain, dysuria, diarrhea    FAMILY HISTORY:  Family history of asthma    Family history of breast cancer (Sibling)    Family history of diabetes mellitus type II      74yVital Signs Last 24 Hrs  T(C): 36.8 (13 Oct 2024 05:55), Max: 36.8 (13 Oct 2024 05:55)  T(F): 98.2 (13 Oct 2024 05:55), Max: 98.2 (13 Oct 2024 05:55)  HR: 56 (13 Oct 2024 05:55) (55 - 70)  BP: 137/74 (13 Oct 2024 05:55) (114/66 - 172/80)  BP(mean): --  RR: 18 (13 Oct 2024 05:55) (18 - 18)  SpO2: 99% (13 Oct 2024 05:55) (94% - 99%)    Parameters below as of 13 Oct 2024 05:55  Patient On (Oxygen Delivery Method): room air          PHYSICAL EXAM:  GENERAL: NAD,  HEAD:  Atraumatic, Normocephalic  EYES: anicteric  NECK: Supple, No JVD  CHEST/LUNG: Clear to auscultation bilaterally; No wheeze  HEART: Regular rate and rhythm; No murmurs, rubs, or gallops  ABDOMEN: Soft, Nontender, Nondistended  EXTREMITIES: No b/l LE edema  NEUROLOGY: A&Ox3, non-focal  SKIN: No rashes or lesions    Consultant(s) Notes Reviewed:  [x ] YES  [ ] NO  Care Discussed with Consultants/Other Providers [ x] YES  [ ] NO    LABS:                        10.4   11.28 )-----------( 232      ( 12 Oct 2024 09:24 )             33.8       10-12    140  |  106  |  13  ----------------------------<  221[H]  4.5   |  23  |  0.66    Ca    8.6      12 Oct 2024 09:25  Phos  2.4     10-12  Mg     1.9     10-12    TPro  6.2  /  Alb  3.3  /  TBili  0.2  /  DBili  x   /  AST  16  /  ALT  15  /  AlkPhos  58  10-12              Urinalysis Basic - ( 12 Oct 2024 09:25 )    Color: x / Appearance: x / SG: x / pH: x  Gluc: 221 mg/dL / Ketone: x  / Bili: x / Urobili: x   Blood: x / Protein: x / Nitrite: x   Leuk Esterase: x / RBC: x / WBC x   Sq Epi: x / Non Sq Epi: x / Bacteria: x    CAPILLARY BLOOD GLUCOSE    POCT Blood Glucose.: 200 mg/dL (13 Oct 2024 08:02)    MedsMEDICATIONS  (STANDING):  albuterol    90 MICROgram(s) HFA Inhaler 2 Puff(s) Inhalation every 6 hours  apixaban 5 milliGRAM(s) Oral every 12 hours  ascorbic acid 500 milliGRAM(s) Oral daily  atorvastatin 20 milliGRAM(s) Oral at bedtime  chlorhexidine 2% Cloths 1 Application(s) Topical daily  clopidogrel Tablet 75 milliGRAM(s) Oral daily  dextrose 5%. 1000 milliLiter(s) (100 mL/Hr) IV Continuous <Continuous>  dextrose 5%. 1000 milliLiter(s) (50 mL/Hr) IV Continuous <Continuous>  dextrose 50% Injectable 25 Gram(s) IV Push once  dextrose 50% Injectable 12.5 Gram(s) IV Push once  dextrose 50% Injectable 25 Gram(s) IV Push once  diphenhydrAMINE 25 milliGRAM(s) Oral daily  ertapenem  IVPB 1000 milliGRAM(s) IV Intermittent every 24 hours  ferrous    sulfate 325 milliGRAM(s) Oral daily  fluticasone propionate/ salmeterol 250-50 MICROgram(s) Diskus 1 Dose(s) Inhalation two times a day  gabapentin 100 milliGRAM(s) Oral every 8 hours  glucagon  Injectable 1 milliGRAM(s) IntraMuscular once  insulin glargine Injectable (LANTUS) 8 Unit(s) SubCutaneous at bedtime  insulin glargine Injectable (LANTUS) 28 Unit(s) SubCutaneous every morning  insulin lispro (ADMELOG) corrective regimen sliding scale   SubCutaneous at bedtime  insulin lispro (ADMELOG) corrective regimen sliding scale   SubCutaneous three times a day before meals  insulin lispro Injectable (ADMELOG) 2 Unit(s) SubCutaneous three times a day before meals  labetalol 100 milliGRAM(s) Oral three times a day  lacosamide 100 milliGRAM(s) Oral two times a day  levETIRAcetam 1500 milliGRAM(s) Oral two times a day  methylPREDNISolone sodium succinate Injectable 40 milliGRAM(s) IV Push daily  mexiletine 200 milliGRAM(s) Oral every 8 hours  montelukast 10 milliGRAM(s) Oral daily  multivitamin 1 Tablet(s) Oral daily  mupirocin 2% Nasal 1 Application(s) Both Nostrils two times a day  NIFEdipine XL 30 milliGRAM(s) Oral daily  pantoprazole    Tablet 40 milliGRAM(s) Oral before breakfast  polyethylene glycol 3350 17 Gram(s) Oral daily  senna 2 Tablet(s) Oral at bedtime  sertraline 50 milliGRAM(s) Oral daily  tiotropium 2.5 MICROgram(s) Inhaler 2 Puff(s) Inhalation daily  valACYclovir 1000 milliGRAM(s) Oral two times a day    MEDICATIONS  (PRN):  albuterol/ipratropium for Nebulization 3 milliLiter(s) Nebulizer every 6 hours PRN Wheezing  dextrose Oral Gel 15 Gram(s) Oral once PRN Blood Glucose LESS THAN 70 milliGRAM(s)/deciliter  melatonin 3 milliGRAM(s) Oral at bedtime PRN Insomnia      HEALTH ISSUES - PROBLEM Dx:  Pneumonia    Severe asthma    H/O aortic dissection    Mild anemia    Type 2 diabetes mellitus    Atrial fibrillation    Seizure    HLD (hyperlipidemia)    Need for prophylactic measure             RAYRAY RODRIGUEZ  74y  Female    Complaints:  Subjective:     No acute o/n events. Pt this morning is sleeping comfortably and denies any chest pain, difficulty breathing or abdominal pain.     FAMILY HISTORY:  Family history of asthma    Family history of breast cancer (Sibling)    Family history of diabetes mellitus type II      74yVital Signs Last 24 Hrs  T(C): 36.8 (13 Oct 2024 05:55), Max: 36.8 (13 Oct 2024 05:55)  T(F): 98.2 (13 Oct 2024 05:55), Max: 98.2 (13 Oct 2024 05:55)  HR: 56 (13 Oct 2024 05:55) (55 - 70)  BP: 137/74 (13 Oct 2024 05:55) (114/66 - 172/80)  BP(mean): --  RR: 18 (13 Oct 2024 05:55) (18 - 18)  SpO2: 99% (13 Oct 2024 05:55) (94% - 99%)    Parameters below as of 13 Oct 2024 05:55  Patient On (Oxygen Delivery Method): room air          PHYSICAL EXAM:  GENERAL: NAD,  HEAD:  Atraumatic, Normocephalic  EYES: anicteric  NECK: Supple, No JVD  CHEST/LUNG: Clear to auscultation bilaterally; No wheeze  HEART: Regular rate and rhythm; No murmurs, rubs, or gallops  ABDOMEN: Soft, Nontender, Nondistended  EXTREMITIES: No b/l LE edema  NEUROLOGY: A&Ox3, non-focal  SKIN: No rashes or lesions    Consultant(s) Notes Reviewed:  [x ] YES  [ ] NO  Care Discussed with Consultants/Other Providers [ x] YES  [ ] NO    LABS:                        10.4   11.28 )-----------( 232      ( 12 Oct 2024 09:24 )             33.8       10-12    140  |  106  |  13  ----------------------------<  221[H]  4.5   |  23  |  0.66    Ca    8.6      12 Oct 2024 09:25  Phos  2.4     10-12  Mg     1.9     10-12    TPro  6.2  /  Alb  3.3  /  TBili  0.2  /  DBili  x   /  AST  16  /  ALT  15  /  AlkPhos  58  10-12              Urinalysis Basic - ( 12 Oct 2024 09:25 )    Color: x / Appearance: x / SG: x / pH: x  Gluc: 221 mg/dL / Ketone: x  / Bili: x / Urobili: x   Blood: x / Protein: x / Nitrite: x   Leuk Esterase: x / RBC: x / WBC x   Sq Epi: x / Non Sq Epi: x / Bacteria: x    CAPILLARY BLOOD GLUCOSE    POCT Blood Glucose.: 200 mg/dL (13 Oct 2024 08:02)    MedsMEDICATIONS  (STANDING):  albuterol    90 MICROgram(s) HFA Inhaler 2 Puff(s) Inhalation every 6 hours  apixaban 5 milliGRAM(s) Oral every 12 hours  ascorbic acid 500 milliGRAM(s) Oral daily  atorvastatin 20 milliGRAM(s) Oral at bedtime  chlorhexidine 2% Cloths 1 Application(s) Topical daily  clopidogrel Tablet 75 milliGRAM(s) Oral daily  dextrose 5%. 1000 milliLiter(s) (100 mL/Hr) IV Continuous <Continuous>  dextrose 5%. 1000 milliLiter(s) (50 mL/Hr) IV Continuous <Continuous>  dextrose 50% Injectable 25 Gram(s) IV Push once  dextrose 50% Injectable 12.5 Gram(s) IV Push once  dextrose 50% Injectable 25 Gram(s) IV Push once  diphenhydrAMINE 25 milliGRAM(s) Oral daily  ertapenem  IVPB 1000 milliGRAM(s) IV Intermittent every 24 hours  ferrous    sulfate 325 milliGRAM(s) Oral daily  fluticasone propionate/ salmeterol 250-50 MICROgram(s) Diskus 1 Dose(s) Inhalation two times a day  gabapentin 100 milliGRAM(s) Oral every 8 hours  glucagon  Injectable 1 milliGRAM(s) IntraMuscular once  insulin glargine Injectable (LANTUS) 8 Unit(s) SubCutaneous at bedtime  insulin glargine Injectable (LANTUS) 28 Unit(s) SubCutaneous every morning  insulin lispro (ADMELOG) corrective regimen sliding scale   SubCutaneous at bedtime  insulin lispro (ADMELOG) corrective regimen sliding scale   SubCutaneous three times a day before meals  insulin lispro Injectable (ADMELOG) 2 Unit(s) SubCutaneous three times a day before meals  labetalol 100 milliGRAM(s) Oral three times a day  lacosamide 100 milliGRAM(s) Oral two times a day  levETIRAcetam 1500 milliGRAM(s) Oral two times a day  methylPREDNISolone sodium succinate Injectable 40 milliGRAM(s) IV Push daily  mexiletine 200 milliGRAM(s) Oral every 8 hours  montelukast 10 milliGRAM(s) Oral daily  multivitamin 1 Tablet(s) Oral daily  mupirocin 2% Nasal 1 Application(s) Both Nostrils two times a day  NIFEdipine XL 30 milliGRAM(s) Oral daily  pantoprazole    Tablet 40 milliGRAM(s) Oral before breakfast  polyethylene glycol 3350 17 Gram(s) Oral daily  senna 2 Tablet(s) Oral at bedtime  sertraline 50 milliGRAM(s) Oral daily  tiotropium 2.5 MICROgram(s) Inhaler 2 Puff(s) Inhalation daily  valACYclovir 1000 milliGRAM(s) Oral two times a day    MEDICATIONS  (PRN):  albuterol/ipratropium for Nebulization 3 milliLiter(s) Nebulizer every 6 hours PRN Wheezing  dextrose Oral Gel 15 Gram(s) Oral once PRN Blood Glucose LESS THAN 70 milliGRAM(s)/deciliter  melatonin 3 milliGRAM(s) Oral at bedtime PRN Insomnia      HEALTH ISSUES - PROBLEM Dx:  Pneumonia    Severe asthma    H/O aortic dissection    Mild anemia    Type 2 diabetes mellitus    Atrial fibrillation    Seizure    HLD (hyperlipidemia)    Need for prophylactic measure             RAYRAY RODRIGUEZ  74y  Female    Complaints:  Subjective:     No acute o/n events. Pt this morning is sleeping comfortably and denies any chest pain, difficulty breathing or abdominal pain.     FAMILY HISTORY:  Family history of asthma    Family history of breast cancer (Sibling)    Family history of diabetes mellitus type II      74yVital Signs Last 24 Hrs  T(C): 36.8 (13 Oct 2024 05:55), Max: 36.8 (13 Oct 2024 05:55)  T(F): 98.2 (13 Oct 2024 05:55), Max: 98.2 (13 Oct 2024 05:55)  HR: 56 (13 Oct 2024 05:55) (55 - 70)  BP: 137/74 (13 Oct 2024 05:55) (114/66 - 172/80)  BP(mean): --  RR: 18 (13 Oct 2024 05:55) (18 - 18)  SpO2: 99% (13 Oct 2024 05:55) (94% - 99%)    Parameters below as of 13 Oct 2024 05:55  Patient On (Oxygen Delivery Method): room air          PHYSICAL EXAM:  GENERAL: NAD,  HEAD:  Atraumatic, Normocephalic  EYES: anicteric  CHEST/LUNG: Clear to auscultation bilaterally; No wheeze  HEART: Regular rate and rhythm; No murmurs, rubs, or gallops  ABDOMEN: Soft, Nontender, Nondistended  EXTREMITIES: No b/l LE edema  NEUROLOGY: A&Ox3, non-focal      Consultant(s) Notes Reviewed:  [x ] YES  [ ] NO  Care Discussed with Consultants/Other Providers [ x] YES  [ ] NO    LABS:                        10.4   11.28 )-----------( 232      ( 12 Oct 2024 09:24 )             33.8       10-12    140  |  106  |  13  ----------------------------<  221[H]  4.5   |  23  |  0.66    Ca    8.6      12 Oct 2024 09:25  Phos  2.4     10-12  Mg     1.9     10-12    TPro  6.2  /  Alb  3.3  /  TBili  0.2  /  DBili  x   /  AST  16  /  ALT  15  /  AlkPhos  58  10-12              Urinalysis Basic - ( 12 Oct 2024 09:25 )    Color: x / Appearance: x / SG: x / pH: x  Gluc: 221 mg/dL / Ketone: x  / Bili: x / Urobili: x   Blood: x / Protein: x / Nitrite: x   Leuk Esterase: x / RBC: x / WBC x   Sq Epi: x / Non Sq Epi: x / Bacteria: x    CAPILLARY BLOOD GLUCOSE    POCT Blood Glucose.: 200 mg/dL (13 Oct 2024 08:02)    MedsMEDICATIONS  (STANDING):  albuterol    90 MICROgram(s) HFA Inhaler 2 Puff(s) Inhalation every 6 hours  apixaban 5 milliGRAM(s) Oral every 12 hours  ascorbic acid 500 milliGRAM(s) Oral daily  atorvastatin 20 milliGRAM(s) Oral at bedtime  chlorhexidine 2% Cloths 1 Application(s) Topical daily  clopidogrel Tablet 75 milliGRAM(s) Oral daily  dextrose 5%. 1000 milliLiter(s) (100 mL/Hr) IV Continuous <Continuous>  dextrose 5%. 1000 milliLiter(s) (50 mL/Hr) IV Continuous <Continuous>  dextrose 50% Injectable 25 Gram(s) IV Push once  dextrose 50% Injectable 12.5 Gram(s) IV Push once  dextrose 50% Injectable 25 Gram(s) IV Push once  diphenhydrAMINE 25 milliGRAM(s) Oral daily  ertapenem  IVPB 1000 milliGRAM(s) IV Intermittent every 24 hours  ferrous    sulfate 325 milliGRAM(s) Oral daily  fluticasone propionate/ salmeterol 250-50 MICROgram(s) Diskus 1 Dose(s) Inhalation two times a day  gabapentin 100 milliGRAM(s) Oral every 8 hours  glucagon  Injectable 1 milliGRAM(s) IntraMuscular once  insulin glargine Injectable (LANTUS) 8 Unit(s) SubCutaneous at bedtime  insulin glargine Injectable (LANTUS) 28 Unit(s) SubCutaneous every morning  insulin lispro (ADMELOG) corrective regimen sliding scale   SubCutaneous at bedtime  insulin lispro (ADMELOG) corrective regimen sliding scale   SubCutaneous three times a day before meals  insulin lispro Injectable (ADMELOG) 2 Unit(s) SubCutaneous three times a day before meals  labetalol 100 milliGRAM(s) Oral three times a day  lacosamide 100 milliGRAM(s) Oral two times a day  levETIRAcetam 1500 milliGRAM(s) Oral two times a day  methylPREDNISolone sodium succinate Injectable 40 milliGRAM(s) IV Push daily  mexiletine 200 milliGRAM(s) Oral every 8 hours  montelukast 10 milliGRAM(s) Oral daily  multivitamin 1 Tablet(s) Oral daily  mupirocin 2% Nasal 1 Application(s) Both Nostrils two times a day  NIFEdipine XL 30 milliGRAM(s) Oral daily  pantoprazole    Tablet 40 milliGRAM(s) Oral before breakfast  polyethylene glycol 3350 17 Gram(s) Oral daily  senna 2 Tablet(s) Oral at bedtime  sertraline 50 milliGRAM(s) Oral daily  tiotropium 2.5 MICROgram(s) Inhaler 2 Puff(s) Inhalation daily  valACYclovir 1000 milliGRAM(s) Oral two times a day    MEDICATIONS  (PRN):  albuterol/ipratropium for Nebulization 3 milliLiter(s) Nebulizer every 6 hours PRN Wheezing  dextrose Oral Gel 15 Gram(s) Oral once PRN Blood Glucose LESS THAN 70 milliGRAM(s)/deciliter  melatonin 3 milliGRAM(s) Oral at bedtime PRN Insomnia      HEALTH ISSUES - PROBLEM Dx:  Pneumonia    Severe asthma    H/O aortic dissection    Mild anemia    Type 2 diabetes mellitus    Atrial fibrillation    Seizure    HLD (hyperlipidemia)    Need for prophylactic measure

## 2024-10-13 NOTE — PROGRESS NOTE ADULT - ASSESSMENT
74F with tracheomalacia s/p tracheoplasty, known COPD / bronchiectasis, asthma on chronic steroids (solumedrol 8mg daily), hx Proteus bacteremia secondary to aspiration pneumonia (+esbl), multiple drug allergies, hx adrenal insufficiency.  Recently admitted to [facility] with COPD exacerbation.  No antibiotics.  Now presenting with increased SOB.  No fever.  Briefly required supplemental O2.  Productive cough of yellow sputum.  BC and sputum culture sent.  Given a dose of aztreonam, levaquin.  Patient reports hx itching after ertapenem (no anaphylaxis) but better with benadryl.  No hx allergy to meropenem as documented in the chart.  CT done with b/l GGO / patchy opacities (not really new, seen on prior imaging as well).    COPD exacerbation with hypoxia, sputum production and leukocytosis  - tolerating ertapenem (give benadryl prior per patient request)  - can limit course to 5-7 days  - mrsa screen (+), however, no fever  -leucocytosis, declining, increased after steroid initiation, monitor  - if worsening clinical status or fever, can add vancomycin (would hold off for now)    Efren Ceron MD  pager [phone]  office [phone]  Please call the ID service [phone] with questions or concerns over the weekend

## 2024-10-13 NOTE — PROGRESS NOTE ADULT - PROBLEM SELECTOR PLAN 5
On lantus 30 units in morning and 8 units in the evening. A1c 8.   -Continue 24 units in morning and 6 units nightly   -ISS On lantus 30 units in morning and 9 units in the evening. A1c 8.   -Continue 24 units in morning and 6 units nightly   -ISS On lantus 30 units in morning and 9 units in the evening. A1c 8.   -Continue to titrate the insulin as glucose is not well controlled in setting of steroid   - Continue with Lantus 2 times a day and Low dose Admelog added   - Please titrate up or down based on the Solumedrol dose   - DM educator karen TOBIAS

## 2024-10-13 NOTE — PROGRESS NOTE ADULT - PROBLEM SELECTOR PLAN 1
Presents with cough productive of yellow sputum, sore throat, and shortness of breath. Afebrile (Tmax 100.1 F) in the ED. Leukocytosis to 14.89. CT Chest revealed new ill-defined peribronchovascular groundglass nodular opacities   throughout both lungs, likely infectious. Sepsis given patient met SIRS criteria in the setting of pneumonia   MRSA positive. Legionella, Strep, RVP negative. Sputum culture showed no growth. Blood culturesx2 negative   -Per ID, c/w ertapenem. Premedicate with benadryl prior to ertapenem per pt request   -Will trend WBC count daily   -Low threshold to start vancomycin for MRSA coverage given recent hospitalization if decompensates  -procalcitonin

## 2024-10-14 ENCOUNTER — APPOINTMENT (OUTPATIENT)
Dept: PULMONOLOGY | Facility: CLINIC | Age: 74
End: 2024-10-14

## 2024-10-14 LAB
ALBUMIN SERPL ELPH-MCNC: 3.5 G/DL — SIGNIFICANT CHANGE UP (ref 3.3–5)
ALP SERPL-CCNC: 62 U/L — SIGNIFICANT CHANGE UP (ref 40–120)
ALT FLD-CCNC: 18 U/L — SIGNIFICANT CHANGE UP (ref 10–45)
ANION GAP SERPL CALC-SCNC: 9 MMOL/L — SIGNIFICANT CHANGE UP (ref 5–17)
AST SERPL-CCNC: 17 U/L — SIGNIFICANT CHANGE UP (ref 10–40)
BASOPHILS # BLD AUTO: 0.01 K/UL — SIGNIFICANT CHANGE UP (ref 0–0.2)
BASOPHILS NFR BLD AUTO: 0.1 % — SIGNIFICANT CHANGE UP (ref 0–2)
BILIRUB SERPL-MCNC: 0.2 MG/DL — SIGNIFICANT CHANGE UP (ref 0.2–1.2)
BUN SERPL-MCNC: 16 MG/DL — SIGNIFICANT CHANGE UP (ref 7–23)
CALCIUM SERPL-MCNC: 8.7 MG/DL — SIGNIFICANT CHANGE UP (ref 8.4–10.5)
CHLORIDE SERPL-SCNC: 102 MMOL/L — SIGNIFICANT CHANGE UP (ref 96–108)
CO2 SERPL-SCNC: 26 MMOL/L — SIGNIFICANT CHANGE UP (ref 22–31)
CREAT SERPL-MCNC: 0.68 MG/DL — SIGNIFICANT CHANGE UP (ref 0.5–1.3)
CULTURE RESULTS: SIGNIFICANT CHANGE UP
CULTURE RESULTS: SIGNIFICANT CHANGE UP
EGFR: 90 ML/MIN/1.73M2 — SIGNIFICANT CHANGE UP
EOSINOPHIL # BLD AUTO: 0.01 K/UL — SIGNIFICANT CHANGE UP (ref 0–0.5)
EOSINOPHIL NFR BLD AUTO: 0.1 % — SIGNIFICANT CHANGE UP (ref 0–6)
GLUCOSE BLDC GLUCOMTR-MCNC: 180 MG/DL — HIGH (ref 70–99)
GLUCOSE BLDC GLUCOMTR-MCNC: 261 MG/DL — HIGH (ref 70–99)
GLUCOSE BLDC GLUCOMTR-MCNC: 269 MG/DL — HIGH (ref 70–99)
GLUCOSE BLDC GLUCOMTR-MCNC: 281 MG/DL — HIGH (ref 70–99)
GLUCOSE SERPL-MCNC: 261 MG/DL — HIGH (ref 70–99)
HCT VFR BLD CALC: 36 % — SIGNIFICANT CHANGE UP (ref 34.5–45)
HGB BLD-MCNC: 10.8 G/DL — LOW (ref 11.5–15.5)
IMM GRANULOCYTES NFR BLD AUTO: 1.4 % — HIGH (ref 0–0.9)
LYMPHOCYTES # BLD AUTO: 0.97 K/UL — LOW (ref 1–3.3)
LYMPHOCYTES # BLD AUTO: 7.8 % — LOW (ref 13–44)
MAGNESIUM SERPL-MCNC: 2 MG/DL — SIGNIFICANT CHANGE UP (ref 1.6–2.6)
MCHC RBC-ENTMCNC: 21.9 PG — LOW (ref 27–34)
MCHC RBC-ENTMCNC: 30 GM/DL — LOW (ref 32–36)
MCV RBC AUTO: 72.9 FL — LOW (ref 80–100)
MONOCYTES # BLD AUTO: 0.31 K/UL — SIGNIFICANT CHANGE UP (ref 0–0.9)
MONOCYTES NFR BLD AUTO: 2.5 % — SIGNIFICANT CHANGE UP (ref 2–14)
NEUTROPHILS # BLD AUTO: 10.97 K/UL — HIGH (ref 1.8–7.4)
NEUTROPHILS NFR BLD AUTO: 88.1 % — HIGH (ref 43–77)
NRBC # BLD: 0 /100 WBCS — SIGNIFICANT CHANGE UP (ref 0–0)
PHOSPHATE SERPL-MCNC: 2.6 MG/DL — SIGNIFICANT CHANGE UP (ref 2.5–4.5)
PLATELET # BLD AUTO: 379 K/UL — SIGNIFICANT CHANGE UP (ref 150–400)
POTASSIUM SERPL-MCNC: 4.5 MMOL/L — SIGNIFICANT CHANGE UP (ref 3.5–5.3)
POTASSIUM SERPL-SCNC: 4.5 MMOL/L — SIGNIFICANT CHANGE UP (ref 3.5–5.3)
PROT SERPL-MCNC: 6.2 G/DL — SIGNIFICANT CHANGE UP (ref 6–8.3)
RBC # BLD: 4.94 M/UL — SIGNIFICANT CHANGE UP (ref 3.8–5.2)
RBC # FLD: 19.2 % — HIGH (ref 10.3–14.5)
SODIUM SERPL-SCNC: 137 MMOL/L — SIGNIFICANT CHANGE UP (ref 135–145)
SPECIMEN SOURCE: SIGNIFICANT CHANGE UP
SPECIMEN SOURCE: SIGNIFICANT CHANGE UP
WBC # BLD: 12.44 K/UL — HIGH (ref 3.8–10.5)
WBC # FLD AUTO: 12.44 K/UL — HIGH (ref 3.8–10.5)

## 2024-10-14 PROCEDURE — 99233 SBSQ HOSP IP/OBS HIGH 50: CPT | Mod: GC

## 2024-10-14 RX ORDER — IPRATROPIUM BROMIDE AND ALBUTEROL SULFATE .5; 3 MG/3ML; MG/3ML
3 SOLUTION RESPIRATORY (INHALATION) EVERY 6 HOURS
Refills: 0 | Status: DISCONTINUED | OUTPATIENT
Start: 2024-10-14 | End: 2024-10-15

## 2024-10-14 RX ORDER — METHYLPREDNISOLONE ACETATE 80 MG/ML
32 INJECTION, SUSPENSION INTRA-ARTICULAR; INTRALESIONAL; INTRAMUSCULAR; SOFT TISSUE DAILY
Refills: 0 | Status: DISCONTINUED | OUTPATIENT
Start: 2024-10-14 | End: 2024-10-15

## 2024-10-14 RX ADMIN — LACOSAMIDE 100 MILLIGRAM(S): 10 SOLUTION ORAL at 05:48

## 2024-10-14 RX ADMIN — MUPIROCIN 1 APPLICATION(S): 20 OINTMENT TOPICAL at 18:53

## 2024-10-14 RX ADMIN — Medication 500 MILLIGRAM(S): at 15:49

## 2024-10-14 RX ADMIN — CHLORHEXIDINE GLUCONATE ORAL RINSE 1 APPLICATION(S): 1.2 SOLUTION DENTAL at 14:37

## 2024-10-14 RX ADMIN — APIXABAN 5 MILLIGRAM(S): 5 TABLET, FILM COATED ORAL at 05:49

## 2024-10-14 RX ADMIN — Medication 2 PUFF(S): at 18:43

## 2024-10-14 RX ADMIN — Medication 75 MILLIGRAM(S): at 15:48

## 2024-10-14 RX ADMIN — Medication 2 PUFF(S): at 12:58

## 2024-10-14 RX ADMIN — Medication 325 MILLIGRAM(S): at 15:50

## 2024-10-14 RX ADMIN — GABAPENTIN 100 MILLIGRAM(S): 800 TABLET, FILM COATED ORAL at 05:49

## 2024-10-14 RX ADMIN — APIXABAN 5 MILLIGRAM(S): 5 TABLET, FILM COATED ORAL at 18:43

## 2024-10-14 RX ADMIN — LABETALOL HYDROCHLORIDE 100 MILLIGRAM(S): 200 TABLET, FILM COATED ORAL at 22:09

## 2024-10-14 RX ADMIN — PANTOPRAZOLE SODIUM 40 MILLIGRAM(S): 40 TABLET, DELAYED RELEASE ORAL at 05:49

## 2024-10-14 RX ADMIN — Medication 1 DOSE(S): at 18:53

## 2024-10-14 RX ADMIN — LABETALOL HYDROCHLORIDE 100 MILLIGRAM(S): 200 TABLET, FILM COATED ORAL at 16:33

## 2024-10-14 RX ADMIN — Medication 6: at 11:27

## 2024-10-14 RX ADMIN — MONTELUKAST SODIUM 10 MILLIGRAM(S): 10 TABLET, FILM COATED ORAL at 15:48

## 2024-10-14 RX ADMIN — Medication 30 MILLIGRAM(S): at 05:47

## 2024-10-14 RX ADMIN — Medication 200 MILLIGRAM(S): at 22:09

## 2024-10-14 RX ADMIN — ERTAPENEM 120 MILLIGRAM(S): 1 INJECTION, POWDER, LYOPHILIZED, FOR SOLUTION INTRAMUSCULAR; INTRAVENOUS at 18:44

## 2024-10-14 RX ADMIN — MULTI VITAMIN/MINERAL SUPPLEMENT WITH ASCORBIC ACID, NIACIN, PYRIDOXINE, PANTOTHENIC ACID, FOLIC ACID, RIBOFLAVIN, THIAMIN, BIOTIN, COBALAMIN AND ZINC. 1 TABLET(S): 60; 20; 12.5; 10; 10; 1.7; 1.5; 1; .3; .006 TABLET, COATED ORAL at 15:49

## 2024-10-14 RX ADMIN — Medication 25 MILLIGRAM(S): at 16:31

## 2024-10-14 RX ADMIN — ATORVASTATIN CALCIUM 20 MILLIGRAM(S): 10 TABLET, FILM COATED ORAL at 22:09

## 2024-10-14 RX ADMIN — Medication 200 MILLIGRAM(S): at 05:47

## 2024-10-14 RX ADMIN — Medication 1500 MILLIGRAM(S): at 05:47

## 2024-10-14 RX ADMIN — LACOSAMIDE 100 MILLIGRAM(S): 10 SOLUTION ORAL at 18:42

## 2024-10-14 RX ADMIN — GABAPENTIN 100 MILLIGRAM(S): 800 TABLET, FILM COATED ORAL at 22:09

## 2024-10-14 RX ADMIN — SERTRALINE HYDROCHLORIDE 50 MILLIGRAM(S): 100 TABLET, FILM COATED ORAL at 15:50

## 2024-10-14 RX ADMIN — METHYLPREDNISOLONE ACETATE 40 MILLIGRAM(S): 80 INJECTION, SUSPENSION INTRA-ARTICULAR; INTRALESIONAL; INTRAMUSCULAR; SOFT TISSUE at 05:47

## 2024-10-14 RX ADMIN — VALACYCLOVIR 1000 MILLIGRAM(S): 1000 TABLET ORAL at 05:49

## 2024-10-14 RX ADMIN — Medication 1500 MILLIGRAM(S): at 18:50

## 2024-10-14 RX ADMIN — Medication 17 GRAM(S): at 16:32

## 2024-10-14 RX ADMIN — LABETALOL HYDROCHLORIDE 100 MILLIGRAM(S): 200 TABLET, FILM COATED ORAL at 05:48

## 2024-10-14 RX ADMIN — MUPIROCIN 1 APPLICATION(S): 20 OINTMENT TOPICAL at 05:48

## 2024-10-14 RX ADMIN — Medication 200 MILLIGRAM(S): at 16:33

## 2024-10-14 RX ADMIN — Medication 1 DOSE(S): at 05:46

## 2024-10-14 RX ADMIN — IPRATROPIUM BROMIDE AND ALBUTEROL SULFATE 3 MILLILITER(S): .5; 3 SOLUTION RESPIRATORY (INHALATION) at 23:42

## 2024-10-14 RX ADMIN — IPRATROPIUM BROMIDE AND ALBUTEROL SULFATE 3 MILLILITER(S): .5; 3 SOLUTION RESPIRATORY (INHALATION) at 18:58

## 2024-10-14 RX ADMIN — Medication 2 TABLET(S): at 22:09

## 2024-10-14 RX ADMIN — TIOTROPIUM BROMIDE INHALATION SPRAY 2 PUFF(S): 3.12 SPRAY, METERED RESPIRATORY (INHALATION) at 14:30

## 2024-10-14 RX ADMIN — GABAPENTIN 100 MILLIGRAM(S): 800 TABLET, FILM COATED ORAL at 16:31

## 2024-10-14 RX ADMIN — Medication 2 PUFF(S): at 05:47

## 2024-10-14 NOTE — PROGRESS NOTE ADULT - PROBLEM SELECTOR PLAN 3
History of aortic dissection s/p repair and TAVR in 2023   History of HTN- on hydralazine, labetalol, nifedipine at home   On clopidogrel at home     -C/w home nifedipine and labetalol given hx of aortic dissection, hold other home BP meds   -Continue clopidogrel

## 2024-10-14 NOTE — PROGRESS NOTE ADULT - PROBLEM SELECTOR PLAN 5
On lantus 30 units in morning and 9 units in the evening. A1c 8.   -Continue to titrate the insulin as glucose is not well controlled in setting of steroid   - Continue with Lantus 2 times a day and Low dose Admelog added   - Please titrate up or down based on the Solumedrol dose   - DM educator karen TOBIAS

## 2024-10-14 NOTE — PROGRESS NOTE ADULT - SUBJECTIVE AND OBJECTIVE BOX
RAYRAY RODRIGUEZ  74y  Female    Complaints:  Subjective:     No acute o/n events. Patient this morning denies any chest pain, trouble breathing or abdominal pain.     FAMILY HISTORY:  Family history of asthma    Family history of breast cancer (Sibling)    Family history of diabetes mellitus type II      74yVital Signs Last 24 Hrs  T(C): 36.3 (14 Oct 2024 05:50), Max: 36.8 (13 Oct 2024 16:47)  T(F): 97.4 (14 Oct 2024 05:50), Max: 98.2 (13 Oct 2024 16:47)  HR: 55 (14 Oct 2024 05:50) (54 - 58)  BP: 128/72 (14 Oct 2024 05:50) (120/63 - 134/61)  BP(mean): --  RR: 18 (14 Oct 2024 05:50) (17 - 18)  SpO2: 96% (14 Oct 2024 05:50) (94% - 96%)    Parameters below as of 14 Oct 2024 05:50  Patient On (Oxygen Delivery Method): room air    PHYSICAL EXAM:  GENERAL: NAD,  HEAD:  Atraumatic, Normocephalic  EYES: anicteric  NECK: Supple, No JVD  CHEST/LUNG: Clear to auscultation bilaterally; No wheeze  HEART: Regular rate and rhythm; No murmurs, rubs, or gallops  ABDOMEN: Soft, Nontender, Nondistended  EXTREMITIES: No b/l LE edema  NEUROLOGY: A&Ox3, non-focal  SKIN: No rashes or lesions    Consultant(s) Notes Reviewed:  [x ] YES  [ ] NO  Care Discussed with Consultants/Other Providers [ x] YES  [ ] NO    LABS:                        10.6   11.92 )-----------( 363      ( 13 Oct 2024 10:16 )             35.6       10-13    139  |  104  |  14  ----------------------------<  231[H]  4.4   |  26  |  0.64    Ca    8.9      13 Oct 2024 10:16  Phos  2.5     10-13  Mg     1.9     10-13    TPro  6.3  /  Alb  3.6  /  TBili  0.2  /  DBili  x   /  AST  16  /  ALT  15  /  AlkPhos  60  10-13              Urinalysis Basic - ( 13 Oct 2024 10:16 )    Color: x / Appearance: x / SG: x / pH: x  Gluc: 231 mg/dL / Ketone: x  / Bili: x / Urobili: x   Blood: x / Protein: x / Nitrite: x   Leuk Esterase: x / RBC: x / WBC x   Sq Epi: x / Non Sq Epi: x / Bacteria: x    CAPILLARY BLOOD GLUCOSE      POCT Blood Glucose.: 203 mg/dL (13 Oct 2024 21:17)      MedsMEDICATIONS  (STANDING):  albuterol    90 MICROgram(s) HFA Inhaler 2 Puff(s) Inhalation every 6 hours  apixaban 5 milliGRAM(s) Oral every 12 hours  ascorbic acid 500 milliGRAM(s) Oral daily  atorvastatin 20 milliGRAM(s) Oral at bedtime  chlorhexidine 2% Cloths 1 Application(s) Topical daily  clopidogrel Tablet 75 milliGRAM(s) Oral daily  dextrose 5%. 1000 milliLiter(s) (50 mL/Hr) IV Continuous <Continuous>  dextrose 5%. 1000 milliLiter(s) (100 mL/Hr) IV Continuous <Continuous>  dextrose 50% Injectable 25 Gram(s) IV Push once  dextrose 50% Injectable 12.5 Gram(s) IV Push once  dextrose 50% Injectable 25 Gram(s) IV Push once  diphenhydrAMINE 25 milliGRAM(s) Oral daily  ertapenem  IVPB 1000 milliGRAM(s) IV Intermittent every 24 hours  ferrous    sulfate 325 milliGRAM(s) Oral daily  fluticasone propionate/ salmeterol 250-50 MICROgram(s) Diskus 1 Dose(s) Inhalation two times a day  gabapentin 100 milliGRAM(s) Oral every 8 hours  glucagon  Injectable 1 milliGRAM(s) IntraMuscular once  insulin glargine Injectable (LANTUS) 10 Unit(s) SubCutaneous at bedtime  insulin glargine Injectable (LANTUS) 28 Unit(s) SubCutaneous every morning  insulin lispro (ADMELOG) corrective regimen sliding scale   SubCutaneous three times a day before meals  insulin lispro (ADMELOG) corrective regimen sliding scale   SubCutaneous at bedtime  insulin lispro Injectable (ADMELOG) 4 Unit(s) SubCutaneous three times a day before meals  labetalol 100 milliGRAM(s) Oral three times a day  lacosamide 100 milliGRAM(s) Oral two times a day  levETIRAcetam 1500 milliGRAM(s) Oral two times a day  methylPREDNISolone sodium succinate Injectable 40 milliGRAM(s) IV Push daily  mexiletine 200 milliGRAM(s) Oral every 8 hours  montelukast 10 milliGRAM(s) Oral daily  multivitamin 1 Tablet(s) Oral daily  mupirocin 2% Nasal 1 Application(s) Both Nostrils two times a day  NIFEdipine XL 30 milliGRAM(s) Oral daily  pantoprazole    Tablet 40 milliGRAM(s) Oral before breakfast  polyethylene glycol 3350 17 Gram(s) Oral daily  senna 2 Tablet(s) Oral at bedtime  sertraline 50 milliGRAM(s) Oral daily  tiotropium 2.5 MICROgram(s) Inhaler 2 Puff(s) Inhalation daily  valACYclovir 1000 milliGRAM(s) Oral two times a day    MEDICATIONS  (PRN):  albuterol/ipratropium for Nebulization 3 milliLiter(s) Nebulizer every 6 hours PRN Wheezing  benzonatate 100 milliGRAM(s) Oral every 8 hours PRN Cough  dextrose Oral Gel 15 Gram(s) Oral once PRN Blood Glucose LESS THAN 70 milliGRAM(s)/deciliter  melatonin 3 milliGRAM(s) Oral at bedtime PRN Insomnia      HEALTH ISSUES - PROBLEM Dx:  Pneumonia    Severe asthma    H/O aortic dissection    Mild anemia    Type 2 diabetes mellitus    Atrial fibrillation    Seizure    HLD (hyperlipidemia)    Need for prophylactic measure             RAYRAY RODRIGUEZ  74y  Female    Complaints:  Subjective:     No acute o/n events. Patient this morning denies any chest pain, trouble breathing or abdominal pain. Patient reports feeling improved.     FAMILY HISTORY:  Family history of asthma    Family history of breast cancer (Sibling)    Family history of diabetes mellitus type II    74yVital Signs Last 24 Hrs  T(C): 36.3 (14 Oct 2024 05:50), Max: 36.8 (13 Oct 2024 16:47)  T(F): 97.4 (14 Oct 2024 05:50), Max: 98.2 (13 Oct 2024 16:47)  HR: 55 (14 Oct 2024 05:50) (54 - 58)  BP: 128/72 (14 Oct 2024 05:50) (120/63 - 134/61)  BP(mean): --  RR: 18 (14 Oct 2024 05:50) (17 - 18)  SpO2: 96% (14 Oct 2024 05:50) (94% - 96%)    Parameters below as of 14 Oct 2024 05:50  Patient On (Oxygen Delivery Method): room air    PHYSICAL EXAM:  GENERAL: NAD,  HEAD:  Atraumatic, Normocephalic  EYES: anicteric  NECK: Supple, No JVD  CHEST/LUNG: Clear to auscultation bilaterally; No wheeze  HEART: Regular rate and rhythm; No murmurs, rubs, or gallops  ABDOMEN: Soft, Nontender, Nondistended  EXTREMITIES: No b/l LE edema  NEUROLOGY: A&Ox3, non-focal  SKIN: No rashes or lesions    Consultant(s) Notes Reviewed:  [x ] YES  [ ] NO  Care Discussed with Consultants/Other Providers [ x] YES  [ ] NO    LABS:                        10.6   11.92 )-----------( 363      ( 13 Oct 2024 10:16 )             35.6       10-13    139  |  104  |  14  ----------------------------<  231[H]  4.4   |  26  |  0.64    Ca    8.9      13 Oct 2024 10:16  Phos  2.5     10-13  Mg     1.9     10-13    TPro  6.3  /  Alb  3.6  /  TBili  0.2  /  DBili  x   /  AST  16  /  ALT  15  /  AlkPhos  60  10-13              Urinalysis Basic - ( 13 Oct 2024 10:16 )    Color: x / Appearance: x / SG: x / pH: x  Gluc: 231 mg/dL / Ketone: x  / Bili: x / Urobili: x   Blood: x / Protein: x / Nitrite: x   Leuk Esterase: x / RBC: x / WBC x   Sq Epi: x / Non Sq Epi: x / Bacteria: x    CAPILLARY BLOOD GLUCOSE      POCT Blood Glucose.: 203 mg/dL (13 Oct 2024 21:17)      MedsMEDICATIONS  (STANDING):  albuterol    90 MICROgram(s) HFA Inhaler 2 Puff(s) Inhalation every 6 hours  apixaban 5 milliGRAM(s) Oral every 12 hours  ascorbic acid 500 milliGRAM(s) Oral daily  atorvastatin 20 milliGRAM(s) Oral at bedtime  chlorhexidine 2% Cloths 1 Application(s) Topical daily  clopidogrel Tablet 75 milliGRAM(s) Oral daily  dextrose 5%. 1000 milliLiter(s) (50 mL/Hr) IV Continuous <Continuous>  dextrose 5%. 1000 milliLiter(s) (100 mL/Hr) IV Continuous <Continuous>  dextrose 50% Injectable 25 Gram(s) IV Push once  dextrose 50% Injectable 12.5 Gram(s) IV Push once  dextrose 50% Injectable 25 Gram(s) IV Push once  diphenhydrAMINE 25 milliGRAM(s) Oral daily  ertapenem  IVPB 1000 milliGRAM(s) IV Intermittent every 24 hours  ferrous    sulfate 325 milliGRAM(s) Oral daily  fluticasone propionate/ salmeterol 250-50 MICROgram(s) Diskus 1 Dose(s) Inhalation two times a day  gabapentin 100 milliGRAM(s) Oral every 8 hours  glucagon  Injectable 1 milliGRAM(s) IntraMuscular once  insulin glargine Injectable (LANTUS) 10 Unit(s) SubCutaneous at bedtime  insulin glargine Injectable (LANTUS) 28 Unit(s) SubCutaneous every morning  insulin lispro (ADMELOG) corrective regimen sliding scale   SubCutaneous three times a day before meals  insulin lispro (ADMELOG) corrective regimen sliding scale   SubCutaneous at bedtime  insulin lispro Injectable (ADMELOG) 4 Unit(s) SubCutaneous three times a day before meals  labetalol 100 milliGRAM(s) Oral three times a day  lacosamide 100 milliGRAM(s) Oral two times a day  levETIRAcetam 1500 milliGRAM(s) Oral two times a day  methylPREDNISolone sodium succinate Injectable 40 milliGRAM(s) IV Push daily  mexiletine 200 milliGRAM(s) Oral every 8 hours  montelukast 10 milliGRAM(s) Oral daily  multivitamin 1 Tablet(s) Oral daily  mupirocin 2% Nasal 1 Application(s) Both Nostrils two times a day  NIFEdipine XL 30 milliGRAM(s) Oral daily  pantoprazole    Tablet 40 milliGRAM(s) Oral before breakfast  polyethylene glycol 3350 17 Gram(s) Oral daily  senna 2 Tablet(s) Oral at bedtime  sertraline 50 milliGRAM(s) Oral daily  tiotropium 2.5 MICROgram(s) Inhaler 2 Puff(s) Inhalation daily  valACYclovir 1000 milliGRAM(s) Oral two times a day    MEDICATIONS  (PRN):  albuterol/ipratropium for Nebulization 3 milliLiter(s) Nebulizer every 6 hours PRN Wheezing  benzonatate 100 milliGRAM(s) Oral every 8 hours PRN Cough  dextrose Oral Gel 15 Gram(s) Oral once PRN Blood Glucose LESS THAN 70 milliGRAM(s)/deciliter  melatonin 3 milliGRAM(s) Oral at bedtime PRN Insomnia      HEALTH ISSUES - PROBLEM Dx:  Pneumonia    Severe asthma    H/O aortic dissection    Mild anemia    Type 2 diabetes mellitus    Atrial fibrillation    Seizure    HLD (hyperlipidemia)    Need for prophylactic measure

## 2024-10-14 NOTE — PROGRESS NOTE ADULT - PROBLEM SELECTOR PLAN 1
Presents with cough productive of yellow sputum, sore throat, and shortness of breath. Afebrile (Tmax 100.1 F) in the ED. Leukocytosis to 14.89. CT Chest revealed new ill-defined peribronchovascular groundglass nodular opacities   throughout both lungs, likely infectious. Sepsis given patient met SIRS criteria in the setting of pneumonia   MRSA positive. Legionella, Strep, RVP negative. Sputum culture showed no growth. Blood culturesx2 negative. Procal 0.11.   -Per ID, c/w ertapenem. Premedicate with benadryl prior to ertapenem per pt request (10/10--)   -Will trend WBC count daily   -Low threshold to start vancomycin for MRSA coverage given recent hospitalization if decompensates

## 2024-10-14 NOTE — PROGRESS NOTE ADULT - PROBLEM SELECTOR PLAN 2
Home regimen: -ohtuvayre inhalation suspension, breo ellipta and incruse ellipta- not avaliable inpt. Also on methylpred, albuterol, duonebs, montelukast   -albuterol inhaler   -continue montelukast   -Pulmonary consulted, appreciate reccs     - Advair 250/50 bid, spiriva 2 puffs daily    - CW solumedrol IV 40mg daily    -incentive spirometer and acapella for airway clearance Home regimen: -ohtuvayre inhalation suspension, breo ellipta and incruse ellipta- not avaliable inpt. Also on methylpred, albuterol, duonebs, montelukast   -albuterol inhaler   -continue montelukast   -Pulmonary consulted, appreciate reccs     - Advair 250/50 bid, spiriva 2 puffs daily    - CW solumedrol IV 40mg daily -- can switch to PO steroids per pulm    -incentive spirometer and acapella for airway clearance

## 2024-10-14 NOTE — PROGRESS NOTE ADULT - ATTENDING COMMENTS
Patient seen and examined this morning. Case discussed with resident team.    Patient reports her SOB is improving but cough is still present.    #sepsis secondary to pneumonia  #hx of proteus bacteremia due to aspiration PNA (+ESBL)  #hx of severe asthma/COPD/bronchiectasis on chronic steroid  #hx of tracheomalacia s/p tracheoplasty  #hx of aortic dissection, s/p repair  #hx of TAVR  #hx of adrenal insufficiency  #DM with steroid induced hyperglycemia  #chronic afib    - c/w ertapenem (10/10- ). per ID, anticipating 5-7 day course.  - c/w solumedrol- plan to decrease by 10mg q3d per pulm until home dose of methylprednisolone 8mg daily. will d/w pulm regarding switching to PO.   - blood cultures negative to date  - on advair, spiriva, montelukast, nebs (change to ATC)  - continue eliquis and aspirin   - continue mexilitine   - continue holding home hydralazine (25mg q8hr), nifedipine ER 30mg daily and labetolol 100 mg q8hr resumed  - adjust insulin regimen according to FS glucose  - OOB to chair, chest PT    d/c planning home with HHA reinstatement once can be transitioned to PO steroid.

## 2024-10-15 ENCOUNTER — TRANSCRIPTION ENCOUNTER (OUTPATIENT)
Age: 74
End: 2024-10-15

## 2024-10-15 VITALS
RESPIRATION RATE: 18 BRPM | SYSTOLIC BLOOD PRESSURE: 122 MMHG | TEMPERATURE: 97 F | OXYGEN SATURATION: 97 % | HEART RATE: 68 BPM | DIASTOLIC BLOOD PRESSURE: 73 MMHG

## 2024-10-15 LAB
ALBUMIN SERPL ELPH-MCNC: 3.4 G/DL — SIGNIFICANT CHANGE UP (ref 3.3–5)
ALP SERPL-CCNC: 64 U/L — SIGNIFICANT CHANGE UP (ref 40–120)
ALT FLD-CCNC: 25 U/L — SIGNIFICANT CHANGE UP (ref 10–45)
ANION GAP SERPL CALC-SCNC: 11 MMOL/L — SIGNIFICANT CHANGE UP (ref 5–17)
AST SERPL-CCNC: 27 U/L — SIGNIFICANT CHANGE UP (ref 10–40)
BASOPHILS # BLD AUTO: 0.03 K/UL — SIGNIFICANT CHANGE UP (ref 0–0.2)
BASOPHILS NFR BLD AUTO: 0.3 % — SIGNIFICANT CHANGE UP (ref 0–2)
BILIRUB SERPL-MCNC: 0.2 MG/DL — SIGNIFICANT CHANGE UP (ref 0.2–1.2)
BUN SERPL-MCNC: 20 MG/DL — SIGNIFICANT CHANGE UP (ref 7–23)
CALCIUM SERPL-MCNC: 8.9 MG/DL — SIGNIFICANT CHANGE UP (ref 8.4–10.5)
CHLORIDE SERPL-SCNC: 102 MMOL/L — SIGNIFICANT CHANGE UP (ref 96–108)
CO2 SERPL-SCNC: 25 MMOL/L — SIGNIFICANT CHANGE UP (ref 22–31)
CREAT SERPL-MCNC: 0.64 MG/DL — SIGNIFICANT CHANGE UP (ref 0.5–1.3)
EGFR: 92 ML/MIN/1.73M2 — SIGNIFICANT CHANGE UP
EOSINOPHIL # BLD AUTO: 0.04 K/UL — SIGNIFICANT CHANGE UP (ref 0–0.5)
EOSINOPHIL NFR BLD AUTO: 0.4 % — SIGNIFICANT CHANGE UP (ref 0–6)
GLUCOSE BLDC GLUCOMTR-MCNC: 209 MG/DL — HIGH (ref 70–99)
GLUCOSE BLDC GLUCOMTR-MCNC: 243 MG/DL — HIGH (ref 70–99)
GLUCOSE SERPL-MCNC: 278 MG/DL — HIGH (ref 70–99)
HCT VFR BLD CALC: 35.9 % — SIGNIFICANT CHANGE UP (ref 34.5–45)
HGB BLD-MCNC: 10.8 G/DL — LOW (ref 11.5–15.5)
IMM GRANULOCYTES NFR BLD AUTO: 1.3 % — HIGH (ref 0–0.9)
LYMPHOCYTES # BLD AUTO: 1.42 K/UL — SIGNIFICANT CHANGE UP (ref 1–3.3)
LYMPHOCYTES # BLD AUTO: 12.9 % — LOW (ref 13–44)
MAGNESIUM SERPL-MCNC: 2.1 MG/DL — SIGNIFICANT CHANGE UP (ref 1.6–2.6)
MCHC RBC-ENTMCNC: 21.8 PG — LOW (ref 27–34)
MCHC RBC-ENTMCNC: 30.1 GM/DL — LOW (ref 32–36)
MCV RBC AUTO: 72.4 FL — LOW (ref 80–100)
MONOCYTES # BLD AUTO: 0.65 K/UL — SIGNIFICANT CHANGE UP (ref 0–0.9)
MONOCYTES NFR BLD AUTO: 5.9 % — SIGNIFICANT CHANGE UP (ref 2–14)
NEUTROPHILS # BLD AUTO: 8.72 K/UL — HIGH (ref 1.8–7.4)
NEUTROPHILS NFR BLD AUTO: 79.2 % — HIGH (ref 43–77)
NRBC # BLD: 0 /100 WBCS — SIGNIFICANT CHANGE UP (ref 0–0)
PHOSPHATE SERPL-MCNC: 2.6 MG/DL — SIGNIFICANT CHANGE UP (ref 2.5–4.5)
PLATELET # BLD AUTO: 384 K/UL — SIGNIFICANT CHANGE UP (ref 150–400)
POTASSIUM SERPL-MCNC: 4.7 MMOL/L — SIGNIFICANT CHANGE UP (ref 3.5–5.3)
POTASSIUM SERPL-SCNC: 4.7 MMOL/L — SIGNIFICANT CHANGE UP (ref 3.5–5.3)
PROT SERPL-MCNC: 6.2 G/DL — SIGNIFICANT CHANGE UP (ref 6–8.3)
RBC # BLD: 4.96 M/UL — SIGNIFICANT CHANGE UP (ref 3.8–5.2)
RBC # FLD: 18.8 % — HIGH (ref 10.3–14.5)
SODIUM SERPL-SCNC: 138 MMOL/L — SIGNIFICANT CHANGE UP (ref 135–145)
WBC # BLD: 11 K/UL — HIGH (ref 3.8–10.5)
WBC # FLD AUTO: 11 K/UL — HIGH (ref 3.8–10.5)

## 2024-10-15 PROCEDURE — 84132 ASSAY OF SERUM POTASSIUM: CPT

## 2024-10-15 PROCEDURE — 97161 PT EVAL LOW COMPLEX 20 MIN: CPT

## 2024-10-15 PROCEDURE — 96374 THER/PROPH/DIAG INJ IV PUSH: CPT

## 2024-10-15 PROCEDURE — 87637 SARSCOV2&INF A&B&RSV AMP PRB: CPT

## 2024-10-15 PROCEDURE — 85730 THROMBOPLASTIN TIME PARTIAL: CPT

## 2024-10-15 PROCEDURE — 93971 EXTREMITY STUDY: CPT

## 2024-10-15 PROCEDURE — 84484 ASSAY OF TROPONIN QUANT: CPT

## 2024-10-15 PROCEDURE — 84466 ASSAY OF TRANSFERRIN: CPT

## 2024-10-15 PROCEDURE — 87449 NOS EACH ORGANISM AG IA: CPT

## 2024-10-15 PROCEDURE — 83735 ASSAY OF MAGNESIUM: CPT

## 2024-10-15 PROCEDURE — 83605 ASSAY OF LACTIC ACID: CPT

## 2024-10-15 PROCEDURE — 85025 COMPLETE CBC W/AUTO DIFF WBC: CPT

## 2024-10-15 PROCEDURE — 71045 X-RAY EXAM CHEST 1 VIEW: CPT

## 2024-10-15 PROCEDURE — 93005 ELECTROCARDIOGRAM TRACING: CPT

## 2024-10-15 PROCEDURE — 82728 ASSAY OF FERRITIN: CPT

## 2024-10-15 PROCEDURE — 87040 BLOOD CULTURE FOR BACTERIA: CPT

## 2024-10-15 PROCEDURE — 0241U: CPT

## 2024-10-15 PROCEDURE — 82330 ASSAY OF CALCIUM: CPT

## 2024-10-15 PROCEDURE — 85018 HEMOGLOBIN: CPT

## 2024-10-15 PROCEDURE — 83036 HEMOGLOBIN GLYCOSYLATED A1C: CPT

## 2024-10-15 PROCEDURE — 82962 GLUCOSE BLOOD TEST: CPT

## 2024-10-15 PROCEDURE — 97116 GAIT TRAINING THERAPY: CPT

## 2024-10-15 PROCEDURE — 84436 ASSAY OF TOTAL THYROXINE: CPT

## 2024-10-15 PROCEDURE — 94640 AIRWAY INHALATION TREATMENT: CPT

## 2024-10-15 PROCEDURE — 84443 ASSAY THYROID STIM HORMONE: CPT

## 2024-10-15 PROCEDURE — 87640 STAPH A DNA AMP PROBE: CPT

## 2024-10-15 PROCEDURE — 87205 SMEAR GRAM STAIN: CPT

## 2024-10-15 PROCEDURE — 82947 ASSAY GLUCOSE BLOOD QUANT: CPT

## 2024-10-15 PROCEDURE — 82803 BLOOD GASES ANY COMBINATION: CPT

## 2024-10-15 PROCEDURE — 99239 HOSP IP/OBS DSCHRG MGMT >30: CPT

## 2024-10-15 PROCEDURE — 84100 ASSAY OF PHOSPHORUS: CPT

## 2024-10-15 PROCEDURE — 87070 CULTURE OTHR SPECIMN AEROBIC: CPT

## 2024-10-15 PROCEDURE — 71250 CT THORAX DX C-: CPT | Mod: MC

## 2024-10-15 PROCEDURE — 83550 IRON BINDING TEST: CPT

## 2024-10-15 PROCEDURE — 80053 COMPREHEN METABOLIC PANEL: CPT

## 2024-10-15 PROCEDURE — 85014 HEMATOCRIT: CPT

## 2024-10-15 PROCEDURE — 83880 ASSAY OF NATRIURETIC PEPTIDE: CPT

## 2024-10-15 PROCEDURE — 87641 MR-STAPH DNA AMP PROBE: CPT

## 2024-10-15 PROCEDURE — 97110 THERAPEUTIC EXERCISES: CPT

## 2024-10-15 PROCEDURE — 87899 AGENT NOS ASSAY W/OPTIC: CPT

## 2024-10-15 PROCEDURE — 84295 ASSAY OF SERUM SODIUM: CPT

## 2024-10-15 PROCEDURE — 84145 PROCALCITONIN (PCT): CPT

## 2024-10-15 PROCEDURE — 99232 SBSQ HOSP IP/OBS MODERATE 35: CPT

## 2024-10-15 PROCEDURE — 99285 EMERGENCY DEPT VISIT HI MDM: CPT

## 2024-10-15 PROCEDURE — 83540 ASSAY OF IRON: CPT

## 2024-10-15 PROCEDURE — 82435 ASSAY OF BLOOD CHLORIDE: CPT

## 2024-10-15 PROCEDURE — 85610 PROTHROMBIN TIME: CPT

## 2024-10-15 RX ORDER — METHYLPREDNISOLONE ACETATE 80 MG/ML
1 INJECTION, SUSPENSION INTRA-ARTICULAR; INTRALESIONAL; INTRAMUSCULAR; SOFT TISSUE
Qty: 23 | Refills: 0
Start: 2024-10-15

## 2024-10-15 RX ORDER — METHYLPREDNISOLONE ACETATE 80 MG/ML
1 INJECTION, SUSPENSION INTRA-ARTICULAR; INTRALESIONAL; INTRAMUSCULAR; SOFT TISSUE
Refills: 0 | DISCHARGE

## 2024-10-15 RX ORDER — METHYLPREDNISOLONE ACETATE 80 MG/ML
1 INJECTION, SUSPENSION INTRA-ARTICULAR; INTRALESIONAL; INTRAMUSCULAR; SOFT TISSUE
Qty: 60 | Refills: 0
Start: 2024-10-15

## 2024-10-15 RX ORDER — FERROUS SULFATE 325(65) MG
1 TABLET ORAL
Qty: 0 | Refills: 0 | DISCHARGE
Start: 2024-10-15

## 2024-10-15 RX ORDER — VALACYCLOVIR 1000 MG/1
1 TABLET ORAL
Qty: 60 | Refills: 0
Start: 2024-10-15

## 2024-10-15 RX ORDER — VALACYCLOVIR 1000 MG/1
1 TABLET ORAL
Qty: 0 | Refills: 0 | DISCHARGE
Start: 2024-10-15

## 2024-10-15 RX ORDER — METHYLPREDNISOLONE ACETATE 80 MG/ML
8 INJECTION, SUSPENSION INTRA-ARTICULAR; INTRALESIONAL; INTRAMUSCULAR; SOFT TISSUE
Qty: 30 | Refills: 0
Start: 2024-10-15

## 2024-10-15 RX ORDER — MULTI VITAMIN/MINERAL SUPPLEMENT WITH ASCORBIC ACID, NIACIN, PYRIDOXINE, PANTOTHENIC ACID, FOLIC ACID, RIBOFLAVIN, THIAMIN, BIOTIN, COBALAMIN AND ZINC. 60; 20; 12.5; 10; 10; 1.7; 1.5; 1; .3; .006 MG/1; MG/1; MG/1; MG/1; MG/1; MG/1; MG/1; MG/1; MG/1; MG/1
1 TABLET, COATED ORAL
Qty: 0 | Refills: 0 | DISCHARGE
Start: 2024-10-15

## 2024-10-15 RX ORDER — INSULIN GLARGINE 300 U/ML
15 INJECTION, SOLUTION SUBCUTANEOUS AT BEDTIME
Refills: 0 | Status: DISCONTINUED | OUTPATIENT
Start: 2024-10-15 | End: 2024-10-15

## 2024-10-15 RX ORDER — INSULIN LISPRO 100/ML
5 VIAL (ML) SUBCUTANEOUS
Refills: 0 | Status: DISCONTINUED | OUTPATIENT
Start: 2024-10-15 | End: 2024-10-15

## 2024-10-15 RX ADMIN — VALACYCLOVIR 1000 MILLIGRAM(S): 1000 TABLET ORAL at 17:14

## 2024-10-15 RX ADMIN — Medication 4: at 12:52

## 2024-10-15 RX ADMIN — IPRATROPIUM BROMIDE AND ALBUTEROL SULFATE 3 MILLILITER(S): .5; 3 SOLUTION RESPIRATORY (INHALATION) at 12:48

## 2024-10-15 RX ADMIN — MULTI VITAMIN/MINERAL SUPPLEMENT WITH ASCORBIC ACID, NIACIN, PYRIDOXINE, PANTOTHENIC ACID, FOLIC ACID, RIBOFLAVIN, THIAMIN, BIOTIN, COBALAMIN AND ZINC. 1 TABLET(S): 60; 20; 12.5; 10; 10; 1.7; 1.5; 1; .3; .006 TABLET, COATED ORAL at 12:51

## 2024-10-15 RX ADMIN — Medication 200 MILLIGRAM(S): at 05:01

## 2024-10-15 RX ADMIN — LACOSAMIDE 100 MILLIGRAM(S): 10 SOLUTION ORAL at 05:01

## 2024-10-15 RX ADMIN — METHYLPREDNISOLONE ACETATE 32 MILLIGRAM(S): 80 INJECTION, SUSPENSION INTRA-ARTICULAR; INTRALESIONAL; INTRAMUSCULAR; SOFT TISSUE at 05:02

## 2024-10-15 RX ADMIN — APIXABAN 5 MILLIGRAM(S): 5 TABLET, FILM COATED ORAL at 05:01

## 2024-10-15 RX ADMIN — Medication 1500 MILLIGRAM(S): at 17:15

## 2024-10-15 RX ADMIN — LACOSAMIDE 100 MILLIGRAM(S): 10 SOLUTION ORAL at 17:14

## 2024-10-15 RX ADMIN — LABETALOL HYDROCHLORIDE 100 MILLIGRAM(S): 200 TABLET, FILM COATED ORAL at 13:08

## 2024-10-15 RX ADMIN — GABAPENTIN 100 MILLIGRAM(S): 800 TABLET, FILM COATED ORAL at 13:08

## 2024-10-15 RX ADMIN — Medication 500 MILLIGRAM(S): at 12:49

## 2024-10-15 RX ADMIN — Medication 75 MILLIGRAM(S): at 12:49

## 2024-10-15 RX ADMIN — Medication 25 MILLIGRAM(S): at 12:50

## 2024-10-15 RX ADMIN — Medication 4: at 08:29

## 2024-10-15 RX ADMIN — Medication 1500 MILLIGRAM(S): at 05:00

## 2024-10-15 RX ADMIN — SERTRALINE HYDROCHLORIDE 50 MILLIGRAM(S): 100 TABLET, FILM COATED ORAL at 12:50

## 2024-10-15 RX ADMIN — Medication 1 DOSE(S): at 05:04

## 2024-10-15 RX ADMIN — IPRATROPIUM BROMIDE AND ALBUTEROL SULFATE 3 MILLILITER(S): .5; 3 SOLUTION RESPIRATORY (INHALATION) at 05:03

## 2024-10-15 RX ADMIN — Medication 325 MILLIGRAM(S): at 12:49

## 2024-10-15 RX ADMIN — PANTOPRAZOLE SODIUM 40 MILLIGRAM(S): 40 TABLET, DELAYED RELEASE ORAL at 05:02

## 2024-10-15 RX ADMIN — MUPIROCIN 1 APPLICATION(S): 20 OINTMENT TOPICAL at 05:03

## 2024-10-15 RX ADMIN — TIOTROPIUM BROMIDE INHALATION SPRAY 2 PUFF(S): 3.12 SPRAY, METERED RESPIRATORY (INHALATION) at 12:51

## 2024-10-15 RX ADMIN — IPRATROPIUM BROMIDE AND ALBUTEROL SULFATE 3 MILLILITER(S): .5; 3 SOLUTION RESPIRATORY (INHALATION) at 17:15

## 2024-10-15 RX ADMIN — Medication 2 PUFF(S): at 17:18

## 2024-10-15 RX ADMIN — VALACYCLOVIR 1000 MILLIGRAM(S): 1000 TABLET ORAL at 05:02

## 2024-10-15 RX ADMIN — Medication 2 PUFF(S): at 12:51

## 2024-10-15 RX ADMIN — APIXABAN 5 MILLIGRAM(S): 5 TABLET, FILM COATED ORAL at 17:14

## 2024-10-15 RX ADMIN — Medication 5 UNIT(S): at 12:52

## 2024-10-15 RX ADMIN — LABETALOL HYDROCHLORIDE 100 MILLIGRAM(S): 200 TABLET, FILM COATED ORAL at 05:02

## 2024-10-15 RX ADMIN — Medication 17 GRAM(S): at 12:50

## 2024-10-15 RX ADMIN — GABAPENTIN 100 MILLIGRAM(S): 800 TABLET, FILM COATED ORAL at 05:01

## 2024-10-15 RX ADMIN — MONTELUKAST SODIUM 10 MILLIGRAM(S): 10 TABLET, FILM COATED ORAL at 12:49

## 2024-10-15 RX ADMIN — INSULIN GLARGINE 28 UNIT(S): 300 INJECTION, SOLUTION SUBCUTANEOUS at 08:28

## 2024-10-15 RX ADMIN — CHLORHEXIDINE GLUCONATE ORAL RINSE 1 APPLICATION(S): 1.2 SOLUTION DENTAL at 12:54

## 2024-10-15 RX ADMIN — Medication 2 PUFF(S): at 05:03

## 2024-10-15 RX ADMIN — Medication 4 UNIT(S): at 08:30

## 2024-10-15 RX ADMIN — ERTAPENEM 120 MILLIGRAM(S): 1 INJECTION, POWDER, LYOPHILIZED, FOR SOLUTION INTRAMUSCULAR; INTRAVENOUS at 12:50

## 2024-10-15 RX ADMIN — Medication 200 MILLIGRAM(S): at 13:08

## 2024-10-15 RX ADMIN — Medication 30 MILLIGRAM(S): at 05:01

## 2024-10-15 NOTE — PROGRESS NOTE ADULT - ASSESSMENT
74F with tracheomalacia s/p tracheoplasty, known COPD / bronchiectasis, asthma on chronic steroids (solumedrol 8mg daily), hx Proteus bacteremia secondary to aspiration pneumonia (+esbl), multiple drug allergies, hx adrenal insufficiency.  Recently admitted to [facility] with COPD exacerbation.  No antibiotics.  Now presenting with increased SOB.  No fever.  Briefly required supplemental O2.  Productive cough of yellow sputum.  BC and sputum culture sent.  Given a dose of aztreonam, levaquin.  Patient reports hx itching after ertapenem (no anaphylaxis) but better with benadryl.  No hx allergy to meropenem as documented in the chart.  CT done with b/l GGO / patchy opacities (not really new, seen on prior imaging as well).    COPD exacerbation with hypoxia, sputum production and leukocytosis  - tolerating ertapenem (give benadryl prior per patient request)  - can d/c ertapenem today    Please call Infectious Diseases if there is a change in status.  Thank you.  [phone].

## 2024-10-15 NOTE — PROGRESS NOTE ADULT - TIME BILLING
reviewing medical record, evaluating the patient, documenting in EMR.
reviewing medical record, evaluating the patient, discussing plan of care with patient, ID, and case management, documenting in EMR.

## 2024-10-15 NOTE — DISCHARGE NOTE PROVIDER - ATTENDING DISCHARGE PHYSICAL EXAMINATION:
Patient seen and examined this morning. Case discussed with resident team.    Still with some scattered rhonchi on exam but patient reports her SOB is improving, cough is still present.     #sepsis secondary to pneumonia  #hx of proteus bacteremia due to aspiration PNA (+ESBL)  #hx of severe asthma/COPD/bronchiectasis on chronic steroid  #hx of tracheomalacia s/p tracheoplasty  #hx of aortic dissection, s/p repair  #hx of TAVR  #hx of adrenal insufficiency  #DM with steroid induced hyperglycemia  #chronic afib    - c/w ertapenem (10/10- ). per discussion with ID attending, will stop after today.  - c/w steroid, changed to PO per discussion with pulm yesterday. plan to decrease by 10mg q3d until home dose of methylprednisolone 8mg daily.   - blood cultures negative to date  - on advair, spiriva, montelukast, nebs ATC  - continue eliquis and aspirin   - continue mexilitine   - continue holding home hydralazine (25mg q8hr), nifedipine ER 30mg daily and labetolol 100 mg q8hr resumed    medically stable for d/c planning home once HHA reinstated. d/w patient and CM.     Patient seen and examined this morning. Case discussed with resident team.    Still with some scattered rhonchi on exam but patient reports her SOB is improving, cough is still present.     #sepsis secondary to pneumonia  #hx of proteus bacteremia due to aspiration PNA (+ESBL)  #hx of severe asthma/COPD/bronchiectasis on chronic steroid with acute exacerbation  #hx of tracheomalacia s/p tracheoplasty  #hx of aortic dissection, s/p repair  #hx of TAVR  #hx of adrenal insufficiency  #DM with steroid induced hyperglycemia  #chronic afib    - c/w ertapenem (10/10- ). per discussion with ID attending, will stop after today.  - c/w steroid, changed to PO per discussion with pulm yesterday. plan to decrease by 10mg q3d until home dose of methylprednisolone 8mg daily.   - blood cultures negative to date  - on advair, spiriva, montelukast, nebs ATC  - continue eliquis and aspirin   - continue mexilitine   - continue holding home hydralazine (25mg q8hr), nifedipine ER 30mg daily and labetolol 100 mg q8hr resumed    medically stable for d/c planning home once HHA reinstated. d/w patient and CM.     Patient seen and examined this morning. Case discussed with resident team.    Still with some scattered rhonchi on exam but patient reports her SOB is improving, cough is still present.     #sepsis secondary to pneumonia  #hx of proteus bacteremia due to aspiration PNA (+ESBL)  #hx of severe asthma/COPD/bronchiectasis on chronic steroid with acute exacerbation  #hx of tracheomalacia s/p tracheoplasty  #hx of aortic dissection, s/p repair  #hx of TAVR  #hx of adrenal insufficiency  #DM with steroid induced hyperglycemia  #chronic afib    - c/w ertapenem (10/10- ). per discussion with ID attending, will stop after today.  - c/w steroid, changed to PO per discussion with pulm yesterday. plan to decrease by 10mg q3d until home dose of methylprednisolone 8mg daily.   - blood cultures negative to date  - on advair, spiriva, montelukast, nebs ATC  - continue eliquis and aspirin   - continue mexilitine   - continue holding home hydralazine (25mg q8hr), nifedipine ER 30mg daily and labetolol 100 mg q8hr resumed    medically stable for d/c planning home once HHA reinstated. patient's PMD (Dr. Samuels) and pulm (Dr. Allison) informed of the patient's hospitalization.

## 2024-10-15 NOTE — DISCHARGE NOTE PROVIDER - NSDCFUADDAPPT_GEN_ALL_CORE_FT
APPTS ARE READY TO BE MADE: [x] YES    Best Family or Patient Contact (if needed): Daughter Zoe [phone]    Additional Information about above appointments (if needed):    1: PCP  2: Home Program Pulmonary   3:     Other comments or requests:   If you need a new PCP, Please make an appointment with General Internal Medicine, [address] [place] [address], [address], [place] [address]. Please call [phone] to make an appointment  APPTS ARE READY TO BE MADE: [x] YES    Best Family or Patient Contact (if needed): Daughter Zoe [phone]    Additional Information about above appointments (if needed):    1: PCP in 7 days  2: Home Program Pulmonary in 1-3 days  3:     Other comments or requests:   If you need a new PCP, Please make an appointment with General Internal Medicine, [address] [place] [address], [address], [place] [address]. Please call [phone] to make an appointment  APPTS ARE READY TO BE MADE: [x] YES    Best Family or Patient Contact (if needed): Daughter Zoe [phone]    Additional Information about above appointments (if needed):    1: PCP in 7 days  2: Home Program Pulmonary in 1-3 days  3:     Other comments or requests:   If you need a new PCP, Please make an appointment with General Internal Medicine, [address] [place] [address], [address], [place] [address]. Please call [phone] to make an appointment     Pulmonary Home Program - TELEHEALTH  Appointment was scheduled in Ailyn LAL NP,CHUCKIE VELASQUEZ 10/17/2024 3:00 PM    Internal Medicine:  Prior to outreaching the patient, it was visible that the patient has secured a follow up appointment which was not scheduled by our team.  Bon Craft 10/29/2024 1:40 PM

## 2024-10-15 NOTE — DISCHARGE NOTE PROVIDER - CARE PROVIDER_API CALL
Bon Samuels  Internal Medicine  [address] [address]  Phone: [phone]  Fax: [phone]  Follow Up Time:    Bon Samuels  Internal Medicine  [address] [address]  Phone: [phone]  Fax: [phone]  Follow Up Time: 1 week

## 2024-10-15 NOTE — PROGRESS NOTE ADULT - PROBLEM SELECTOR PLAN 7
-c/w [place] keppra
-c/w [place] keppra
-c/w [place] keppra
-c/w [place] keppra
-c/w [place] keppra
-c/w [place] keppra

## 2024-10-15 NOTE — PROGRESS NOTE ADULT - SUBJECTIVE AND OBJECTIVE BOX
The patient is a 74y Female complaining of difficult breathing    f/u pneumonia    Interval History/ROS:  no fever.  no wheezing.  tired.  Remainder of ROS otherwise negative.    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  H/O pelvic surgery 5 years ago - s/p fracture  History of tracheomalacia 2015 - attempted tracheal stenting ([facility])- course complicated by obstruction, respiratory failure, multiple CPR attempts -  stent discontinued; 10/20/2016 Tracheobronchoplasty (Prolene Mesh) performed at [facility] by Dr Zapien  S/P bronchoscopy 6/5/2018 - [place] (Dr Zapien) no evidence of tracheobronchomalacia in trachea or bronchial tubes  Rectal bleeding   S/P TAVR (transcatheter aortic valve replacement)  S/P aortic valve replacement    Allergies  aspirin (Short breath)  OxyContin (Unknown)  Dilaudid (Short breath)  Valium (Short breath)  tetanus toxoid (Short breath)  ampicillin (Unknown)  cefepime (Anaphylaxis)  Avelox (Short breath; Pruritus)  shellfish (Anaphylaxis)  penicillin (Anaphylaxis)  codeine (Short breath)  Percocet (Unknown)  iodine (Short breath; Swelling)  meropenem (patient is not allergic, please remove from allergies)    ANTIMICROBIALS:  aztreonam  IVPB (10/9 x1)  levoFLOXacin  Tablet 750 every 24 hours (10/10 x1)    active:  ertapenem  IVPB 1000 every 24 hours (10/10-)  valACYclovir 1000 two times a day    MEDICATIONS  (STANDING):  albuterol    90 MICROgram(s) HFA Inhaler 2 every 6 hours  albuterol/ipratropium for Nebulization 3 every 6 hours  apixaban 5 every 12 hours  atorvastatin 20 at bedtime  clopidogrel Tablet 75 daily  diphenhydrAMINE 25 daily  fluticasone propionate/ salmeterol 250-50 MICROgram(s) Diskus 1 two times a day  gabapentin 100 every 8 hours  glucagon  Injectable 1 once  insulin glargine Injectable (LANTUS) 15 at bedtime  insulin glargine Injectable (LANTUS) 28 every morning  insulin lispro (ADMELOG) corrective regimen sliding scale  three times a day before meals  insulin lispro (ADMELOG) corrective regimen sliding scale  at bedtime  insulin lispro Injectable (ADMELOG) 4 three times a day before meals  labetalol 100 three times a day  lacosamide 100 two times a day  levETIRAcetam 1500 two times a day  methylPREDNISolone 32 daily  mexiletine 200 every 8 hours  montelukast 10 daily  NIFEdipine XL 30 daily  pantoprazole    Tablet 40 before breakfast  polyethylene glycol 3350 17 daily  senna 2 at bedtime  sertraline 50 daily  tiotropium 2.5 MICROgram(s) Inhaler 2 daily    Vital Signs Last 24 Hrs  T(F): 97.8 (10-15-24 @ 00:00), Max: 97.9 (10-14-24 @ 16:34)  HR: 68 (10-15-24 @ 05:00)  BP: 145/72 (10-15-24 @ 05:00)  RR: 18 (10-15-24 @ 00:00)  SpO2: 99% (10-15-24 @ 00:00) (92% - 99%)  Wt(kg): --    PHYSICAL EXAM:  Constitutional: non-toxic, no distress  HEAD/EYES: anicteric  ENT:  supple  Cardiovascular:   normal S1, S2 +murmur  Respiratory:  no wheezing  GI:  soft, non-tender, normal bowel sounds +osteomy  :  no ramirez  Musculoskeletal:  no synovitis  Neurologic: awake and alert, normal strength, no focal findings  Skin:  no rash  Psychiatric:  awake, alert, appropriate mood                                 10.8   11.00 )-----------( 384      ( 15 Oct 2024 08:43 )             35.9 10-15    138  |  102  |  20  ----------------------------<  278  4.7   |  25  |  0.64  Ca    8.9      15 Oct 2024 08:43Phos  2.6     10-15Mg     2.1     10-15  TPro  6.2  /  Alb  3.4  /  TBili  0.2  /  DBili  x   /  AST  27  /  ALT  25  /  AlkPhos  64  10-15    MICROBIOLOGY:  Culture - Sputum (collected 10-10-24 @ 11:15)  Source: .Sputum Sputum  Gram Stain (10-10-24 @ 19:23):    No polymorphonuclear leukocytes per low power field    No Squamous epithelial cells per low power field    No organisms seen per oil power field  Final Report (10-12-24 @ 12:46):    Commensal val consistent with body site    Culture - Blood (collected 10-09-24 @ 09:56)  Source: .Blood BLOOD  Final Report (10-14-24 @ 14:00):    No growth at 5 days    Culture - Blood (collected 10-09-24 @ 09:50)  Source: .Blood BLOOD  Final Report (10-14-24 @ 14:00):    No growth at 5 days    Culture - Sputum (collected 02 Aug 2024 22:23)  Source: .Sputum Sputum  Final Report:    Normal Respiratory Val present    Culture - Urine (collected 02 Aug 2024 19:50)  Source: Clean Catch Clean Catch (Midstream)  Final Report:    10,000 - 49,000 CFU/mL Proteus mirabilis ESBL  Organism: Proteus mirabilis ESBL  Organism: Proteus mirabilis ESBL    Sensitivities:      Method Type: GARRETT      -  Ampicillin: R >16 These ampicillin results predict results for amoxicillin      -  Ampicillin/Sulbactam: S <=4/2      -  Aztreonam: R 16      -  Cefazolin: R >16 For uncomplicated UTI with K. pneumoniae, E. coli, or P. mirablis: GARRETT <=16 is sensitive and GARRETT >=32 is resistant. This also predicts results for oral agents cefaclor, cefdinir, cefpodoxime, cefprozil, cefuroxime axetil, cephalexin and locarbef for uncomplicated UTI. Note that some isolates may be susceptible to these agents while testing resistant to cefazolin.      -  Cefepime: R >16      -  Ceftriaxone: R >32      -  Cefuroxime: R >16      -  Ciprofloxacin: R >2      -  Ertapenem: S <=0.5      -  Gentamicin: R >8      -  Levofloxacin: R >4      -  Meropenem: S <=1      -  Nitrofurantoin: R >64 Should not be used to treat pyelonephritis      -  Piperacillin/Tazobactam: S <=8      -  Tobramycin: R >8      -  Trimethoprim/Sulfamethoxazole: R >2/38    Culture - Blood (collected 26 Apr 2024 00:19)  Source: .Blood Blood-Peripheral  Final Report:    No growth at 5 days    Culture - Blood (collected 26 Apr 2024 00:13)  Source: .Blood Blood-Peripheral  Final Report:    No growth at 5 days    Culture - Urine (collected 26 Apr 2024 00:01)  Source: Clean Catch Clean Catch (Midstream)  Final Report:    <10,000 CFU/mL Normal Urogenital Val    Culture - Blood (collected 25 Dec 2023 08:13)  Source: .Blood Blood  Final Report:    No growth at 5 days    Culture - Blood (collected 25 Dec 2023 08:08)  Source: .Blood Blood  Final Report:    No growth at 5 days    Culture - Blood (collected 24 Dec 2023 11:12)  Source: .Blood Blood-Peripheral  Final Report:    No growth at 5 days    Culture - Blood (collected 24 Dec 2023 11:09)  Source: .Blood Blood-Peripheral  Final Report:    No growth at 5 days    Culture - Blood (collected 21 Dec 2023 12:00)  Source: .Blood Blood-Peripheral  Final Report:    Growth in anaerobic bottle: Proteus mirabilis ESBL    Direct identification is available within approximately 3-5    hours either by Blood Panel Multiplexed PCR or Direct    MALDI-TOF. Details: [email or web address]  Organism: Blood Culture PCR  Proteus mirabilis ESBL  Organism: Proteus mirabilis ESBL    Sensitivities:      Method Type: GARRETT      -  Ampicillin: R >16 These ampicillin results predict results for amoxicillin      -  Ampicillin/Sulbactam: R 8/4      -  Aztreonam: R >16      -  Cefazolin: R >16      -  Cefepime: R >16      -  Ceftriaxone: R >32      -  Ciprofloxacin: R >2      -  Ertapenem: S <=0.5      -  Gentamicin: R >8      -  Levofloxacin: R >4      -  Meropenem: S <=1      -  Piperacillin/Tazobactam: R <=8      -  Tobramycin: R >8      -  Trimethoprim/Sulfamethoxazole: R >2/38  Organism: Blood Culture PCR    Sensitivities:      Method Type: PCR      -  Proteus species: Detec      -  ESBL: Detec      -  CTX-M Resistance Marker: Detec    HIV-1 RNA Quantitative, Viral Load: NOT DET. copies/mL (10-04-22 @ 00:31)  HIV-1 Viral Load Result: NOT DET. (10-04-22 @ 00:31)    RADIOLOGY:  below radiology personally reviewed and agree with finding    CT Chest No Cont (10.09.24 @ 13:18) >  Lungs/Airways/Pleura: Mild apical paraseptal emphysema. Trace left pleural effusion. No pneumothorax. Tracheal mucous secretions. Ill-defined nodular groundglass opacities are noted throughout both lungs (predominantly the upper lobes since prior segment lower lobes) in a peribronchovascular distribution, new from priorstudy. No cavitation. Mild diffuse bronchial wall thickening. Stable scarring in the lateral right middle and lower lobes  Mediastinum/Lymph nodes: New subcentimeter mediastinal nodes (i.e. subaortic 301:30), likely reactive.  Heart and Vessels: Status post ascending aortic replacement and TAVR. Known chronic thoracoabdominal dissection, as better evaluated on the prior contrast-enhanced CTA. Coronary arterial and mitral annular calcification. No pericardial effusion.  IMPRESSION:  New ill-defined peribronchovascular groundglass nodular opacities throughout both lungs, likely infectious. Follow-up chest CT in 8-12 weeks to resolution.      ECHO  TTE W or WO Ultrasound Enhancing Agent (07.03.24 @ 15:45) >  CONCLUSIONS:   1. Left ventricular cavity is normal in size. Left ventricular wall thickness is normal. Left ventricular systolic function is hyperdynamic with an ejection fraction of 75 % by Felipe's method of disks.   2. Normal right ventricular cavity size, with normal wall thickness, and normal right ventricular systolic function. Tricuspid annular plane systolic excursion (TAPSE) is 1.6 cm (normal >=1.7 cm).   3. No pericardial effusion seen.   4. Compared to the transthoracic echocardiogram performed on 9/10/2023, there have been no significant interval changes.   5. There is increased LV mass and concentric hypertrophy.

## 2024-10-15 NOTE — PROGRESS NOTE ADULT - PROBLEM SELECTOR PLAN 2
Home regimen: -ohtuvayre inhalation suspension, breo ellipta and incruse ellipta- not avaliable inpt. Also on methylpred, albuterol, duonebs, montelukast   -albuterol inhaler   -continue montelukast   -Pulmonary consulted, appreciate reccs     - Advair 250/50 bid, spiriva 2 puffs daily    - CW solumedrol IV 40mg daily -- can switch to PO steroids per pulm    -incentive spirometer and acapella for airway clearance

## 2024-10-15 NOTE — DISCHARGE NOTE NURSING/CASE MANAGEMENT/SOCIAL WORK - PATIENT PORTAL LINK FT
You can access the FollowMyHealth Patient Portal offered by [facility] by registering at the following website: [email or web address]. By joining [employer]’s FollowMyHealth portal, you will also be able to view your health information using other applications (apps) compatible with our system.

## 2024-10-15 NOTE — DISCHARGE NOTE PROVIDER - NSDCFUSCHEDAPPT_GEN_ALL_CORE_FT
Dressing: bandage Emmy Horner  [facility]  NEUROLOGY [address]  Scheduled Appointment: 10/22/2024    Rico Glover  [facility]  CARDIOLOGY [address]  Scheduled Appointment: 10/24/2024    Genesis Aragon  [facility]  ELECTROPH [address]  Scheduled Appointment: 10/24/2024    [place]  [facility]  [facility]  [address] [address]  Scheduled Appointment: 10/29/2024    Rico Glover  [facility]  CARDIOLOGY [address]  Scheduled Appointment: 11/07/2024    Genesis Aragon  [facility]  ELECTROPH [address]  Scheduled Appointment: 11/07/2024    Erick Romero  [facility]  CARDIOLOGY [address]  Scheduled Appointment: 12/02/2024    [facility]  CARDIOLOGY [address]  Scheduled Appointment: 12/02/2024    Eulalio Allison  [facility]  PULMMED [address]  Scheduled Appointment: 12/09/2024     Leandra Alvarado  [facility]  PULMMED [address]  Scheduled Appointment: 10/17/2024    Emmy Horner  [facility]  NEUROLOGY [address]  Scheduled Appointment: 10/22/2024    Rico Glover  [facility]  CARDIOLOGY [address]  Scheduled Appointment: 10/24/2024    Genesis Aragon  [facility]  ELECTROPH [address]  Scheduled Appointment: 10/24/2024    [place]  [facility]  [facility] [address]  Scheduled Appointment: 10/29/2024    Rico Glover  [facility]  CARDIOLOGY [address]  Scheduled Appointment: 11/07/2024    Héctor Aragonkaty  [facility]  ELECTROPH [address]  Scheduled Appointment: 11/07/2024    Erick Romero  [facility]  CARDIOLOGY [address]  Scheduled Appointment: 12/02/2024    [facility]  CARDIOLOGY [address]  Scheduled Appointment: 12/02/2024    Eulalio Allison  [facility]  PULMMED [address]  Scheduled Appointment: 12/09/2024

## 2024-10-15 NOTE — DISCHARGE NOTE PROVIDER - NSFOLLOWUPCLINICS_GEN_ALL_ED_FT
Home Program  Pulmonary  [place]   Phone:   Fax:   Follow Up Time: 1-3 days    [facility] - Primary Care  Primary Care  [address] [place] [address]  Phone: [phone]  Fax:

## 2024-10-15 NOTE — PROGRESS NOTE ADULT - PROBLEM SELECTOR PLAN 8
-c/w home atorvastatin

## 2024-10-15 NOTE — DISCHARGE NOTE NURSING/CASE MANAGEMENT/SOCIAL WORK - NSDCVIVACCINE_GEN_ALL_CORE_FT
COVID-19, mRNA, LNP-S, PF, 100 mcg/ 0.5 mL dose (Moderna); 06-Dec-2021 17:12; Afshan Lew (RADHA); [employer] [id number] (Exp. Date: 22-Dec-2021); IntraMuscular; Deltoid Left.; 0.25 milliLiter(s);

## 2024-10-15 NOTE — PROGRESS NOTE ADULT - PROVIDER SPECIALTY LIST ADULT
Infectious Disease
Pulmonology
Infectious Disease
Infectious Disease
Internal Medicine

## 2024-10-15 NOTE — PROGRESS NOTE ADULT - SUBJECTIVE AND OBJECTIVE BOX
RAYRAY RODRIGUEZ  74y  Female    Complaints:  Subjective:     No acute o/n events. Pt this morning denies any chest pain, trouble breathing or abdominal pain.     FAMILY HISTORY:  Family history of asthma    Family history of breast cancer (Sibling)    Family history of diabetes mellitus type II      74yVital Signs Last 24 Hrs  T(C): 36.6 (15 Oct 2024 00:00), Max: 36.6 (14 Oct 2024 16:34)  T(F): 97.8 (15 Oct 2024 00:00), Max: 97.9 (14 Oct 2024 16:34)  HR: 68 (15 Oct 2024 05:00) (55 - 68)  BP: 145/72 (15 Oct 2024 05:00) (131/69 - 148/68)  BP(mean): --  RR: 18 (15 Oct 2024 00:00) (18 - 18)  SpO2: 99% (15 Oct 2024 00:00) (92% - 99%)    Parameters below as of 15 Oct 2024 00:00  Patient On (Oxygen Delivery Method): room air    PHYSICAL EXAM:  GENERAL: NAD,  HEAD:  Atraumatic, Normocephalic  EYES: anicteric  NECK: Supple, No JVD  CHEST/LUNG: Clear to auscultation bilaterally; No wheeze  HEART: Regular rate and rhythm; No murmurs, rubs, or gallops  ABDOMEN: Soft, Nontender, Nondistended  EXTREMITIES: No b/l LE edema  NEUROLOGY: A&Ox3, non-focal  SKIN: No rashes or lesions    Consultant(s) Notes Reviewed:  [x ] YES  [ ] NO  Care Discussed with Consultants/Other Providers [ x] YES  [ ] NO    LABS:                        10.8   12.44 )-----------( 379      ( 14 Oct 2024 10:38 )             36.0       10-14    137  |  102  |  16  ----------------------------<  261[H]  4.5   |  26  |  0.68    Ca    8.7      14 Oct 2024 10:36  Phos  2.6     10-14  Mg     2.0     10-14    TPro  6.2  /  Alb  3.5  /  TBili  0.2  /  DBili  x   /  AST  17  /  ALT  18  /  AlkPhos  62  10-14              Urinalysis Basic - ( 14 Oct 2024 10:36 )    Color: x / Appearance: x / SG: x / pH: x  Gluc: 261 mg/dL / Ketone: x  / Bili: x / Urobili: x   Blood: x / Protein: x / Nitrite: x   Leuk Esterase: x / RBC: x / WBC x   Sq Epi: x / Non Sq Epi: x / Bacteria: x    CAPILLARY BLOOD GLUCOSE    POCT Blood Glucose.: 243 mg/dL (15 Oct 2024 07:45)    MedsMEDICATIONS  (STANDING):  albuterol    90 MICROgram(s) HFA Inhaler 2 Puff(s) Inhalation every 6 hours  albuterol/ipratropium for Nebulization 3 milliLiter(s) Nebulizer every 6 hours  apixaban 5 milliGRAM(s) Oral every 12 hours  ascorbic acid 500 milliGRAM(s) Oral daily  atorvastatin 20 milliGRAM(s) Oral at bedtime  chlorhexidine 2% Cloths 1 Application(s) Topical daily  clopidogrel Tablet 75 milliGRAM(s) Oral daily  dextrose 5%. 1000 milliLiter(s) (50 mL/Hr) IV Continuous <Continuous>  dextrose 5%. 1000 milliLiter(s) (100 mL/Hr) IV Continuous <Continuous>  dextrose 50% Injectable 25 Gram(s) IV Push once  dextrose 50% Injectable 12.5 Gram(s) IV Push once  dextrose 50% Injectable 25 Gram(s) IV Push once  diphenhydrAMINE 25 milliGRAM(s) Oral daily  ertapenem  IVPB 1000 milliGRAM(s) IV Intermittent every 24 hours  ferrous    sulfate 325 milliGRAM(s) Oral daily  fluticasone propionate/ salmeterol 250-50 MICROgram(s) Diskus 1 Dose(s) Inhalation two times a day  gabapentin 100 milliGRAM(s) Oral every 8 hours  glucagon  Injectable 1 milliGRAM(s) IntraMuscular once  insulin glargine Injectable (LANTUS) 10 Unit(s) SubCutaneous at bedtime  insulin glargine Injectable (LANTUS) 28 Unit(s) SubCutaneous every morning  insulin lispro (ADMELOG) corrective regimen sliding scale   SubCutaneous three times a day before meals  insulin lispro (ADMELOG) corrective regimen sliding scale   SubCutaneous at bedtime  insulin lispro Injectable (ADMELOG) 4 Unit(s) SubCutaneous three times a day before meals  labetalol 100 milliGRAM(s) Oral three times a day  lacosamide 100 milliGRAM(s) Oral two times a day  levETIRAcetam 1500 milliGRAM(s) Oral two times a day  methylPREDNISolone 32 milliGRAM(s) Oral daily  mexiletine 200 milliGRAM(s) Oral every 8 hours  montelukast 10 milliGRAM(s) Oral daily  multivitamin 1 Tablet(s) Oral daily  mupirocin 2% Nasal 1 Application(s) Both Nostrils two times a day  NIFEdipine XL 30 milliGRAM(s) Oral daily  pantoprazole    Tablet 40 milliGRAM(s) Oral before breakfast  polyethylene glycol 3350 17 Gram(s) Oral daily  senna 2 Tablet(s) Oral at bedtime  sertraline 50 milliGRAM(s) Oral daily  tiotropium 2.5 MICROgram(s) Inhaler 2 Puff(s) Inhalation daily  valACYclovir 1000 milliGRAM(s) Oral two times a day    MEDICATIONS  (PRN):  benzonatate 100 milliGRAM(s) Oral every 8 hours PRN Cough  dextrose Oral Gel 15 Gram(s) Oral once PRN Blood Glucose LESS THAN 70 milliGRAM(s)/deciliter  melatonin 3 milliGRAM(s) Oral at bedtime PRN Insomnia      HEALTH ISSUES - PROBLEM Dx:  Pneumonia    Severe asthma    H/O aortic dissection    Mild anemia    Type 2 diabetes mellitus    Atrial fibrillation    Seizure    HLD (hyperlipidemia)    Need for prophylactic measure

## 2024-10-15 NOTE — DISCHARGE NOTE NURSING/CASE MANAGEMENT/SOCIAL WORK - NSDCFUADDAPPT_GEN_ALL_CORE_FT
Subjective:       Patient ID: Cale Harding is a 74 y.o. male.    Chief Complaint: Mesothelioma of left lung  ONCOLOGIC HISTORY: Mr. 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  He started having left chest wall discomfort in November 2017, which prompted a chest x-ray.  He underwent a thoracentesis with 1.2 liters removed and apparently cytology was negative; although, I do not have that path in the system and then he noted increasing shortness of breath and repeat pleural effusion and fluid was removed.  He then underwent IR biopsy of the left pleural thickening, which was positive for epithelioid mesothelioma confirmed at [facility].  He underwent PET scan on 02/09/2018, which revealed three left pleural based masses with an SUV max of 7.6 and a small pleural effusion.  He has had night sweats and about 20-pound weight loss in the last three months, low-grade fevers also, occasional shortness of breath and he has chest wall pain, which is not frequent.  Plan originally was to proceed with VATS, left thoracotomy and radical pleurectomy, decortication and HIPEC.  However, after the patient complained of worsening pain, an MRI of the chest was done on 03/01/2018 and that revealed loculated complex pleural fluid collection with marked pleural thickening and internal septation.  The largest and more superior anterior of the two pleural masses measured 7 x 4.3 cm.  The smaller and more inferior one measured 3.9 x 2.2.  The mass abuts the pleural surface and the larger of the two masses abuts the pericardium and the chest wall, involvement or invasion of the structures is not excluded.  The lower mass abuts and possibly invades the diaphragm.     Since initial visit with me he underwent Diagnostic laparoscopy with biopsy of diaphragm. Left VATS thoracoscopy on 3/19/18. Final path  "is pending. Due to amount of involvemt surgery was not done so is on Carboplatin and ALimta     HPI He comes in for cycle 3 of Carboplatin and Alimta. His PET scan from 5/8/18 after 2 cycles of chemo reveals "Significant improvement in pleural disease.  This suggest a favorable response to therapy"        Review of Systems   Constitutional: Positive for appetite change. Negative for unexpected weight change.   Eyes: Negative for visual disturbance.   Respiratory: Positive for shortness of breath. Negative for cough.    Cardiovascular: Positive for chest pain.   Gastrointestinal: Negative for abdominal pain and diarrhea.   Genitourinary: Positive for frequency.   Musculoskeletal: Negative for back pain.   Skin: Negative for rash.   Neurological: Negative for headaches.   Hematological: Negative for adenopathy.   Psychiatric/Behavioral: The patient is not nervous/anxious.        PMFSH: all information reviewed and updated as relevant to today's visit  Objective:      Physical Exam   Constitutional: He is oriented to person, place, and time. He appears well-developed and well-nourished.   HENT:   Mouth/Throat: No oropharyngeal exudate.   Cardiovascular: Normal rate and normal heart sounds.    Pulmonary/Chest: Effort normal and breath sounds normal. He has no wheezes.   Abdominal: Soft. Bowel sounds are normal. There is no tenderness.   Musculoskeletal: He exhibits no edema or tenderness.   Lymphadenopathy:     He has no cervical adenopathy.   Neurological: He is alert and oriented to person, place, and time. Coordination normal.   Skin: Skin is warm and dry. No rash noted.   Psychiatric: He has a normal mood and affect. Judgment and thought content normal.   Vitals reviewed.        LABS:  WBC   Date Value Ref Range Status   05/15/2018 10.03 3.90 - 12.70 K/uL Final     Hemoglobin   Date Value Ref Range Status   05/15/2018 10.6 (L) 14.0 - 18.0 g/dL Final     Hematocrit   Date Value Ref Range Status   05/15/2018 33.7 (L) " 40.0 - 54.0 % Final     Platelets   Date Value Ref Range Status   05/15/2018 306 150 - 350 K/uL Final     Gran # (ANC)   Date Value Ref Range Status   05/15/2018 8.1 (H) 1.8 - 7.7 K/uL Final     Comment:     The ANC is based on a white cell differential from an   automated cell counter. It has not been microscopically   reviewed for the presence of abnormal cells. Clinical   correlation is required.         Chemistry        Component Value Date/Time     05/15/2018 0857    K 4.5 05/15/2018 0857     05/15/2018 0857    CO2 23 05/15/2018 0857    BUN 19 05/15/2018 0857    CREATININE 0.8 05/15/2018 0857     05/15/2018 0857        Component Value Date/Time    CALCIUM 9.8 05/15/2018 0857    ALKPHOS 80 05/15/2018 0857    AST 16 05/15/2018 0857    ALT 15 05/15/2018 0857    BILITOT 0.3 05/15/2018 0857    ESTGFRAFRICA >60.0 05/15/2018 0857    EGFRNONAA >60.0 05/15/2018 0857          Assessment:       1. Mesothelioma        Plan:        1. He is doing well with significant response on scans. He will proceed with cycle 3 of Carboplatin and Alimta and will return in 3 weeks for next cycle. Will ad Avastin in 3 weeks.      Above care plan was discussed with patient and accompanying wife and all questions were addressed to their satisfaction         APPTS ARE READY TO BE MADE: [x] YES    Best Family or Patient Contact (if needed): Daughter Zoe [phone]    Additional Information about above appointments (if needed):    1: PCP  2: Home Program Pulmonary   3:     Other comments or requests:   If you need a new PCP, Please make an appointment with General Internal Medicine, [address] [place] [address], [address], [place] [address]. Please call [phone] to make an appointment

## 2024-10-15 NOTE — DISCHARGE NOTE PROVIDER - NSDCCPCAREPLAN_GEN_ALL_CORE_FT
PRINCIPAL DISCHARGE DIAGNOSIS  Diagnosis: Pneumonia  Assessment and Plan of Treatment: You were admitted to the hospital for pneumonia. Pneumonia is an infection of the lungs, which may be caused by either bacteria or a virus. Symptom include shortness of breath, low oxygen levels, confusion, fever, cough, fatigue, and sometimes nausea and vomiting. Some patients require additional oxygen supplementation depending on the severity of the infection.   To resolve the infection, it is often necessary to complete a course of antibiotics, use an incentive spirometer to exercise and open up your lungs, get out of bed and be as active as tolerated. We consulted the infectious disease doctors who started you on a course of an antibiotic called ertapenem which you completed a six day course of. We also consulted the pulmonary doctors including Dr. Allison who advised us a regimen to help control you asthma, including a steroid taper. Please follow up with your primary doctor and pulmnologist after discharge. Many patients have imaging done for pneuomonia, including chest x-rays and CT scans. If your primary doctor repeats these scans, do not expect complete resolution for 4-6 weeks after initial infection.   Steroid taper:   Methylprednisolone 32 mg PO from 10/15-10/16   Methylprednisolone 24 mg PO from 10/17-10/19   Methylprednisolone 16 mg PO from 10/20-10/22   Methylprednisolone 8 mg PO from 10/23 onwards     PRINCIPAL DISCHARGE DIAGNOSIS  Diagnosis: Pneumonia  Assessment and Plan of Treatment: You were admitted to the hospital for pneumonia. Pneumonia is an infection of the lungs, which may be caused by either bacteria or a virus. Symptom include shortness of breath, low oxygen levels, confusion, fever, cough, fatigue, and sometimes nausea and vomiting. Some patients require additional oxygen supplementation depending on the severity of the infection.   To resolve the infection, it is often necessary to complete a course of antibiotics, use an incentive spirometer to exercise and open up your lungs, get out of bed and be as active as tolerated. We consulted the infectious disease doctors who started you on a course of an antibiotic called ertapenem which you completed a six day course of. We also consulted the pulmonary doctors including Dr. Allison who advised us a regimen to help control you asthma, including a steroid taper. Please follow up with your primary doctor and pulmnologist after discharge. Many patients have imaging done for pneuomonia, including chest x-rays and CT scans. If your primary doctor repeats these scans, do not expect complete resolution for 4-6 weeks after initial infection.   Steroid taper:   Methylprednisolone 32 mg PO from 10/16-10/17   Methylprednisolone 24 mg PO from 10/18-10/20   Methylprednisolone 16 mg PO from 10/21-10/23   Methylprednisolone 8 mg PO from 10/24 onwards     PRINCIPAL DISCHARGE DIAGNOSIS  Diagnosis: Pneumonia  Assessment and Plan of Treatment: You were admitted to the hospital for pneumonia. Pneumonia is an infection of the lungs, which may be caused by either bacteria or a virus. Symptom include shortness of breath, low oxygen levels, confusion, fever, cough, fatigue, and sometimes nausea and vomiting. Some patients require additional oxygen supplementation depending on the severity of the infection.   To resolve the infection, it is often necessary to complete a course of antibiotics, use an incentive spirometer to exercise and open up your lungs, get out of bed and be as active as tolerated. We consulted the infectious disease doctors who started you on a course of an antibiotic called ertapenem which you completed a six day course of. We also consulted the pulmonary doctors including Dr. Allison who advised us a regimen to help control you asthma, including a steroid taper. Please follow up with your primary doctor and pulmnologist after discharge. Many patients have imaging done for pneuomonia, including chest x-rays and CT scans. If your primary doctor repeats these scans, do not expect complete resolution for 4-6 weeks after initial infection.   Steroid taper:   Methylprednisolone 32 mg PO from 10/16-10/17   Methylprednisolone 24 mg PO from 10/18-10/20   Methylprednisolone 16 mg PO from 10/21-10/23   Methylprednisolone 8 mg PO from 10/24 onwards      SECONDARY DISCHARGE DIAGNOSES  Diagnosis: Hypertension  Assessment and Plan of Treatment: Your blood pressures were initially low on admission so your blood pressure medications were held. We gradually started to reitroduce your home regimen for hypertension. Please continue taking your amlodipine and labetalol. Please STOP taking your hydralazine and follow up with your PCP for further management.     PRINCIPAL DISCHARGE DIAGNOSIS  Diagnosis: Pneumonia  Assessment and Plan of Treatment: You were admitted to the hospital for pneumonia. Pneumonia is an infection of the lungs, which may be caused by either bacteria or a virus. Symptom include shortness of breath, low oxygen levels, confusion, fever, cough, fatigue, and sometimes nausea and vomiting. Some patients require additional oxygen supplementation depending on the severity of the infection.   To resolve the infection, it is often necessary to complete a course of antibiotics, use an incentive spirometer to exercise and open up your lungs, get out of bed and be as active as tolerated. We consulted the infectious disease doctors who started you on a course of an antibiotic called ertapenem which you completed a six day course of. We also consulted the pulmonary doctors including Dr. Allison who advised us a regimen to help control you asthma, including a steroid taper. Please follow up with your primary doctor and pulmnologist after discharge. Many patients have imaging done for pneuomonia, including chest x-rays and CT scans. If your primary doctor repeats these scans, do not expect complete resolution for 4-6 weeks after initial infection.   Steroid taper:   Methylprednisolone 32 mg PO from 10/16-10/17   Methylprednisolone 24 mg PO from 10/18-10/20   Methylprednisolone 16 mg PO from 10/21-10/23   Methylprednisolone 8 mg PO from 10/24 onwards      SECONDARY DISCHARGE DIAGNOSES  Diagnosis: Hypertension  Assessment and Plan of Treatment: Your blood pressures were initially low on admission so your blood pressure medications were held. We gradually started to reitroduce your home regimen for hypertension. Please continue taking your amlodipine and labetalol. Please STOP taking your hydralazine and follow up with your PCP for further management.    Diagnosis: Type 2 diabetes mellitus  Assessment and Plan of Treatment: We are increasing your home lantus to 40 units while you are o0n steroids, because steroids can temporarily increase your blood glucose levels. As the dosage of your steroid decreases, please start decreasing your lantus dosage as well back to the home dosage of 30 units. Please follow up with your PCP.     PRINCIPAL DISCHARGE DIAGNOSIS  Diagnosis: Pneumonia  Assessment and Plan of Treatment: You were admitted to the hospital for pneumonia. Pneumonia is an infection of the lungs, which may be caused by either bacteria or a virus. Symptom include shortness of breath, low oxygen levels, confusion, fever, cough, fatigue, and sometimes nausea and vomiting. Some patients require additional oxygen supplementation depending on the severity of the infection.   To resolve the infection, it is often necessary to complete a course of antibiotics, use an incentive spirometer to exercise and open up your lungs, get out of bed and be as active as tolerated. We consulted the infectious disease doctors who started you on a course of an antibiotic called ertapenem which you completed a six day course of. We also consulted the pulmonary doctors including Dr. Allison who advised us a regimen to help control you asthma, including a steroid taper. Please follow up with your primary doctor and pulmnologist after discharge. Many patients have imaging done for pneuomonia, including chest x-rays and CT scans. If your primary doctor repeats these scans, do not expect complete resolution for 4-6 weeks after initial infection.   Steroid taper:   Methylprednisolone 32 mg PO from 10/16-10/17   Methylprednisolone 24 mg PO from 10/18-10/20   Methylprednisolone 16 mg PO from 10/21-10/23   Methylprednisolone 8 mg PO from 10/24 onwards      SECONDARY DISCHARGE DIAGNOSES  Diagnosis: Hypertension  Assessment and Plan of Treatment: Your blood pressures were initially low on admission so your blood pressure medications were held. We gradually started to reitroduce your home regimen for hypertension. Please continue taking your amlodipine and labetalol. Please STOP taking your hydralazine and follow up with your PCP for further management.    Diagnosis: Type 2 diabetes mellitus  Assessment and Plan of Treatment: We are increasing your home lantus to 40 units while you are on steroids, because steroids can temporarily increase your blood glucose levels. Please start takingt lantus 40 units starting 11/16. As the dosage of your steroid decreases, please start decreasing your lantus dosage as well back to the home dosage of 30 units. Please follow up with your PCP.     PRINCIPAL DISCHARGE DIAGNOSIS  Diagnosis: Pneumonia  Assessment and Plan of Treatment: You were admitted to the hospital for pneumonia. Pneumonia is an infection of the lungs, which may be caused by either bacteria or a virus. Symptom include shortness of breath, low oxygen levels, confusion, fever, cough, fatigue, and sometimes nausea and vomiting. Some patients require additional oxygen supplementation depending on the severity of the infection.   To resolve the infection, it is often necessary to complete a course of antibiotics, use an incentive spirometer to exercise and open up your lungs, get out of bed and be as active as tolerated. We consulted the infectious disease doctors who started you on a course of an antibiotic called ertapenem which you completed a six day course of. We also consulted the pulmonary doctors including Dr. Allison who advised us a regimen to help control you asthma, including a steroid taper. Please follow up with your primary doctor and pulmnologist after discharge. Many patients have imaging done for pneuomonia, including chest x-rays and CT scans. If your primary doctor repeats these scans, do not expect complete resolution for 4-6 weeks after initial infection.   Steroid taper:   Methylprednisolone 32 mg PO from 10/16-10/17   Methylprednisolone 24 mg PO from 10/18-10/20   Methylprednisolone 16 mg PO from 10/21-10/23   Methylprednisolone 8 mg PO from 10/24 onwards      SECONDARY DISCHARGE DIAGNOSES  Diagnosis: Hypertension  Assessment and Plan of Treatment: Your blood pressures were initially low on admission so your blood pressure medications were held. We gradually started to reitroduce your home regimen for hypertension. Please continue taking your amlodipine and labetalol. Please STOP taking your hydralazine and follow up with your PCP for further management.    Diagnosis: Type 2 diabetes mellitus  Assessment and Plan of Treatment: While you are on steroids, please increase your lantus dosing accordingly.   On 10/15: Please take 25 units lantus at bedtime.    On 10/16: Please take 40 units lantus at bedtime.   On 10/17: Please take 40 units lantus at bedtime.   On 10/18-10/20: Please take 36 units lantus at bedtime.   On 10/21-10/23: Please take 33 units lantus at bedtime.   On 10/24: Please resume home lantus dose.   As the dosage of your steroid decreases, please start decreasing your lantus dosage as well back to the home dosage of 30 units. Please follow up with your PCP.     PRINCIPAL DISCHARGE DIAGNOSIS  Diagnosis: Pneumonia  Assessment and Plan of Treatment: You were admitted to the hospital for pneumonia. Pneumonia is an infection of the lungs, which may be caused by either bacteria or a virus. Symptom include shortness of breath, low oxygen levels, confusion, fever, cough, fatigue, and sometimes nausea and vomiting. Some patients require additional oxygen supplementation depending on the severity of the infection.   To resolve the infection, it is often necessary to complete a course of antibiotics, use an incentive spirometer to exercise and open up your lungs, get out of bed and be as active as tolerated. We consulted the infectious disease doctors who started you on a course of an antibiotic called ertapenem which you completed a six day course of. We also consulted the pulmonary doctors including Dr. Allison who advised us a regimen to help control you asthma, including a steroid taper. Please follow up with your primary doctor and pulmnologist after discharge. Many patients have imaging done for pneuomonia, including chest x-rays and CT scans. If your primary doctor repeats these scans, do not expect complete resolution for 4-6 weeks after initial infection.   Steroid taper:   Methylprednisolone 32 mg PO from 10/16-10/17   Methylprednisolone 24 mg PO from 10/18-10/20   Methylprednisolone 16 mg PO from 10/21-10/23   Methylprednisolone 8 mg PO from 10/24 onwards      SECONDARY DISCHARGE DIAGNOSES  Diagnosis: Hypertension  Assessment and Plan of Treatment: Your blood pressures were initially low on admission so your blood pressure medications were held. We gradually started to reitroduce your home regimen for hypertension. Please continue taking your amlodipine and labetalol. Please STOP taking your hydralazine and follow up with your PCP for further management.    Diagnosis: Type 2 diabetes mellitus  Assessment and Plan of Treatment: Steroids can transiently increase your blood glucose levels. Please continue taking your home dose of lantus 30 units in the morning and 18 units at bedtime. Please follow up with your PCP for further management of your insulin.

## 2024-10-15 NOTE — DISCHARGE NOTE PROVIDER - PREFACE STATEMENT FOR MINUTES SPENT
Patient left a VM stating that she needs relief with UTI symptoms. She had a reaction to Sulfa earlier this week and was asked to stop until we received the urine culture. Message sent to [email or web address].   
I personally spent

## 2024-10-15 NOTE — DISCHARGE NOTE NURSING/CASE MANAGEMENT/SOCIAL WORK - NSDCPEFALRISK_GEN_ALL_CORE
For information on Fall & Injury Prevention, visit: [email or web address] OR  [email or web address] OR  https://www.cdc.gov/steadi/patient.html

## 2024-10-15 NOTE — DISCHARGE NOTE PROVIDER - HOSPITAL COURSE
HPI:  Shirley Bloom is a 74 year old female with a past medical history of  type a aortic dissection status postrepair, status post TAVR in 2023, asthma on chronic steroids, bronchiectasis, recurrent pneumonia, tracheomalacia status post tracheoplasty, diabetes, A-fib on Eliquis, colorectal cancer status post colostomy, seizures, HTN, HLD who presents with shortness of breath, sore throat, and cough productive of yellow sputum for the past 2 days. Patient states her daughter noticed she had increased work of breathing so she brought her in to see her pulmonologist who referred her to the ED for further management. Patient states she was admitted to [facility] last week for chest pressure. On review of systems, patient reports nausea and constipation with her last bowel movement 2 days ago. Per patient, she denies any vision changes, chest pain, abdominal pain, vomiting, diarrhea, rashes, dysuria, hematuria, or blood in stool.     In the ED, Tmax is 100.1 F, blood pressure range between 100/68 to 111/53, RR rate in low 20s, sating in low 90s on room air. Labs notable for leukocytosis, anemia to 10.1, proBNP of 1200, RVP negative. Patient was given  mL bolus and started on aztreonam antibiotic. Patient had a CT chest which revealed New ill-defined peribronchovascular groundglass nodular opacities throughout both lungs, likely infectious. (09 Oct 2024 16:21)    Hospital Course: Patient admitted for pneumonia. Infectious disease was consulted who recommended meropenem for management, and patient finished a 6 day course. MRSA was positive but patient was monitored off vancomycin with improvement. Patient also has a severe history of asthma with a complex home regimen so pulmonary was consulted, who recommended advair 250/50 bid, spiriva 2 puffs daily, solumedrol IV 40mg daily, incentive spirometer and acapella for airway clearance. Patient was discharged on PO steroids on discharge.     The patient is afebrile, hemodynamically stable and medically optimized for discharge to home with follow up with PCP, pulmonary. On day of discharge, patient is clinically stable with no new exam findings or acute symptoms compared to prior. The patient was seen by the attending physician on the date of discharge and deemed stable and acceptable for discharge. The patient's chronic medical conditions were treated accordingly per the patient's home medication regimen. The patient's medication reconciliation (with changes made to chronic medications), follow up appointments, discharge orders, instructions, and significant lab and diagnostic studies are as noted.    Important Medication Changes and Reason:  Prednisone taper for asthma exacerbation     Active or Pending Issues Requiring Follow-up:  [ ] Prednisone taper   [ ] Asthma medication management     Advanced Directives:   [x] Full code  [ ] DNR  [ ] Hospice    Discharge Diagnoses:  Pneumonia   Asthma exacerbation        HPI:  Shirley Bloom is a 74 year old female with a past medical history of  type a aortic dissection status postrepair, status post TAVR in 2023, asthma on chronic steroids, bronchiectasis, recurrent pneumonia, tracheomalacia status post tracheoplasty, diabetes, A-fib on Eliquis, colorectal cancer status post colostomy, seizures, HTN, HLD who presents with shortness of breath, sore throat, and cough productive of yellow sputum for the past 2 days. Patient states her daughter noticed she had increased work of breathing so she brought her in to see her pulmonologist who referred her to the ED for further management. Patient states she was admitted to [facility] last week for chest pressure. On review of systems, patient reports nausea and constipation with her last bowel movement 2 days ago. Per patient, she denies any vision changes, chest pain, abdominal pain, vomiting, diarrhea, rashes, dysuria, hematuria, or blood in stool.     In the ED, Tmax is 100.1 F, blood pressure range between 100/68 to 111/53, RR rate in low 20s, sating in low 90s on room air. Labs notable for leukocytosis, anemia to 10.1, proBNP of 1200, RVP negative. Patient was given  mL bolus and started on aztreonam antibiotic. Patient had a CT chest which revealed New ill-defined peribronchovascular groundglass nodular opacities throughout both lungs, likely infectious. (09 Oct 2024 16:21)    Hospital Course: Patient admitted for pneumonia. Infectious disease was consulted who recommended meropenem for management, and patient finished a 6 day course. MRSA was positive but patient was monitored off vancomycin with improvement. Patient also has a severe history of asthma with a complex home regimen so pulmonary was consulted, who recommended advair 250/50 bid, spiriva 2 puffs daily, solumedrol IV 40mg daily, incentive spirometer and acapella for airway clearance. Patient was discharged on PO steroids on discharge.     The patient is afebrile, hemodynamically stable and medically optimized for discharge to home with follow up with PCP, pulmonary. On day of discharge, patient is clinically stable with no new exam findings or acute symptoms compared to prior. The patient was seen by the attending physician on the date of discharge and deemed stable and acceptable for discharge. The patient's chronic medical conditions were treated accordingly per the patient's home medication regimen. The patient's medication reconciliation (with changes made to chronic medications), follow up appointments, discharge orders, instructions, and significant lab and diagnostic studies are as noted.    Important Medication Changes and Reason:  Prednisone taper for asthma exacerbation   Held hydralazine on discharge with close follow up     Active or Pending Issues Requiring Follow-up:  [ ] Prednisone taper   [ ] Asthma medication management     Advanced Directives:   [x] Full code  [ ] DNR  [ ] Hospice    Discharge Diagnoses:  Pneumonia   Asthma exacerbation        HPI:  Shirley Bloom is a 74 year old female with a past medical history of  type a aortic dissection status postrepair, status post TAVR in 2023, asthma on chronic steroids, bronchiectasis, recurrent pneumonia, tracheomalacia status post tracheoplasty, diabetes, A-fib on Eliquis, colorectal cancer status post colostomy, seizures, HTN, HLD who presents with shortness of breath, sore throat, and cough productive of yellow sputum for the past 2 days. Patient states her daughter noticed she had increased work of breathing so she brought her in to see her pulmonologist who referred her to the ED for further management. Patient states she was admitted to [facility] last week for chest pressure. On review of systems, patient reports nausea and constipation with her last bowel movement 2 days ago. Per patient, she denies any vision changes, chest pain, abdominal pain, vomiting, diarrhea, rashes, dysuria, hematuria, or blood in stool.     In the ED, Tmax is 100.1 F, blood pressure range between 100/68 to 111/53, RR rate in low 20s, sating in low 90s on room air. Labs notable for leukocytosis, anemia to 10.1, proBNP of 1200, RVP negative. Patient was given  mL bolus and started on aztreonam antibiotic. Patient had a CT chest which revealed New ill-defined peribronchovascular groundglass nodular opacities throughout both lungs, likely infectious. (09 Oct 2024 16:21)    Hospital Course: Patient admitted for pneumonia. Infectious disease was consulted who recommended meropenem for management, and patient finished a 6 day course. MRSA was positive but patient was monitored off vancomycin with improvement. Patient also has a severe history of asthma with a complex home regimen so pulmonary was consulted, who recommended advair 250/50 bid, spiriva 2 puffs daily, solumedrol IV 40mg daily, incentive spirometer and acapella for airway clearance. Patient was discharged on PO steroids on discharge.     The patient is afebrile, hemodynamically stable and medically optimized for discharge to home with follow up with PCP, pulmonary. On day of discharge, patient is clinically stable with no new exam findings or acute symptoms compared to prior. The patient was seen by the attending physician on the date of discharge and deemed stable and acceptable for discharge. The patient's chronic medical conditions were treated accordingly per the patient's home medication regimen. The patient's medication reconciliation (with changes made to chronic medications), follow up appointments, discharge orders, instructions, and significant lab and diagnostic studies are as noted.    Important Medication Changes and Reason:  Prednisone taper for asthma exacerbation   Held hydralazine on discharge with close follow up     Active or Pending Issues Requiring Follow-up:  [ ] Prednisone taper   [ ] Asthma medication management     Advanced Directives:   [x] Full code  [ ] DNR  [ ] Hospice    Discharge Diagnoses:  #sepsis secondary to pneumonia  #hx of severe asthma/COPD/bronchiectasis on chronic steroid  #hx of aortic dissection, s/p repair  #hx of TAVR  #hx of adrenal insufficiency  #DM with steroid induced hyperglycemia  #chronic afib        HPI:  Shirley Bloom is a 74 year old female with a past medical history of  type a aortic dissection status postrepair, status post TAVR in 2023, asthma on chronic steroids, bronchiectasis, recurrent pneumonia, tracheomalacia status post tracheoplasty, diabetes, A-fib on Eliquis, colorectal cancer status post colostomy, seizures, HTN, HLD who presents with shortness of breath, sore throat, and cough productive of yellow sputum for the past 2 days. Patient states her daughter noticed she had increased work of breathing so she brought her in to see her pulmonologist who referred her to the ED for further management. Patient states she was admitted to [facility] last week for chest pressure. On review of systems, patient reports nausea and constipation with her last bowel movement 2 days ago. Per patient, she denies any vision changes, chest pain, abdominal pain, vomiting, diarrhea, rashes, dysuria, hematuria, or blood in stool.     In the ED, Tmax is 100.1 F, blood pressure range between 100/68 to 111/53, RR rate in low 20s, sating in low 90s on room air. Labs notable for leukocytosis, anemia to 10.1, proBNP of 1200, RVP negative. Patient was given  mL bolus and started on aztreonam antibiotic. Patient had a CT chest which revealed New ill-defined peribronchovascular groundglass nodular opacities throughout both lungs, likely infectious. (09 Oct 2024 16:21)    Hospital Course: Patient admitted for pneumonia. Infectious disease was consulted who recommended IV ertapenem for management, and patient finished a 6 day course. MRSA was positive but patient was monitored off vancomycin with improvement. Patient also has a severe history of asthma with a complex home regimen so pulmonary was consulted, who recommended advair 250/50 bid, spiriva 2 puffs daily, solumedrol IV 40mg daily, incentive spirometer and acapella for airway clearance. Patient was discharged on PO steroids on discharge.     The patient is afebrile, hemodynamically stable and medically optimized for discharge to home with follow up with PCP, pulmonary. On day of discharge, patient is clinically stable with no new exam findings or acute symptoms compared to prior. The patient was seen by the attending physician on the date of discharge and deemed stable and acceptable for discharge. The patient's chronic medical conditions were treated accordingly per the patient's home medication regimen. The patient's medication reconciliation (with changes made to chronic medications), follow up appointments, discharge orders, instructions, and significant lab and diagnostic studies are as noted.    Important Medication Changes and Reason:  Prednisone taper for asthma exacerbation   Held hydralazine on discharge with close follow up     Active or Pending Issues Requiring Follow-up:  [ ] Prednisone taper   [ ] Asthma medication management     Advanced Directives:   [x] Full code  [ ] DNR  [ ] Hospice    Discharge Diagnoses:  #sepsis secondary to pneumonia  #hx of severe asthma/COPD/bronchiectasis on chronic steroid with acute exacerbation  #hx of aortic dissection, s/p repair  #hx of TAVR  #hx of adrenal insufficiency  #DM with steroid induced hyperglycemia  #chronic afib

## 2024-10-15 NOTE — PROGRESS NOTE ADULT - PROBLEM SELECTOR PROBLEM 3
H/O aortic dissection

## 2024-10-15 NOTE — PROGRESS NOTE ADULT - PROBLEM SELECTOR PLAN 9
DVT: Eliquis 5 mg daily  Diet:  Diet, CC and DASH

## 2024-10-15 NOTE — DISCHARGE NOTE PROVIDER - NSDCCPTREATMENT_GEN_ALL_CORE_FT
PRINCIPAL PROCEDURE  Procedure: CT chest wo contrast  Findings and Treatment:   < end of copied text >  FINDINGS:  Lungs/Airways/Pleura: Mild apical paraseptal emphysema. Trace left   pleural effusion. No pneumothorax. Tracheal mucous secretions.   Ill-defined nodular groundglass opacities are noted throughout both lungs   (predominantly the upper lobes since prior segment lower lobes) in a   peribronchovascular distribution, new from priorstudy. No cavitation.   Mild diffuse bronchial wall thickening. Stable scarring in the lateral   right middle and lower lobes  Mediastinum/Lymph nodes: New subcentimeter mediastinal nodes (i.e.   subaortic 301:30), likely reactive.  Heart and Vessels: Status post ascending aortic replacement and TAVR.   Known chronic thoracoabdominal dissection, as better evaluated on the   prior contrast-enhanced CTA. Coronary arterial and mitral annular   calcification. No pericardial effusion.  Upper Abdomen: Unremarkable.  Osseous structures and Soft Tissues: Sternotomy.  IMPRESSION:  New ill-defined peribronchovascular groundglass nodular opacities   throughout both lungs, likely infectious. Follow-up chest CT in 8-12   weeks to resolution.  --- End of Report ---< from: CT Chest No Cont (10.09.24 @ 13:18) >        SECONDARY PROCEDURE  Procedure: Venous duplex scan of extremity  Findings and Treatment:   < end of copied text >  FINDINGS:  There is normal compressibility of the right common femoral, femoral and   popliteal veins. The contralateral common femoral vein is patent. Doppler   examination shows normal spontaneous and phasic flow. No calf vein   thrombosis is detected.  IMPRESSION:  No acute DVT of the right lower extremity.< from: [facility] Duplex Lower Ext Vein Scan, Right (10.10.24 @ 13:52) >

## 2024-10-15 NOTE — DISCHARGE NOTE PROVIDER - NSDCMRMEDTOKEN_GEN_ALL_CORE_FT
ascorbic acid 500 mg oral tablet: 1 tab(s) orally once a day  Basaglar KwikPen 100 units/mL subcutaneous solution: 30 unit(s) subcutaneous once a day (at bedtime)  Breo Ellipta 200 mcg-25 mcg/inh inhalation powder: 1 puff(s) inhaled once a day  budesonide 0.5 mg/2 mL inhalation suspension: 2 milliliter(s) by nebulizer 2 times a day  clopidogrel 75 mg oral tablet: 1 tab(s) orally once a day  DuoNeb 0.5 mg-2.5 mg/3 mL inhalation solution: 3 milliliter(s) by nebulizer 4 times a day  Eliquis 5 mg oral tablet: 1 tab(s) orally 2 times a day  ferrous sulfate 325 mg (65 mg elemental iron) oral tablet: 1 tab(s) orally once a day  formoterol 20 mcg/2 mL inhalation solution: 2 milliliter(s) inhaled every 12 hours  gabapentin 100 mg oral capsule: 1 cap(s) orally every 8 hours  glycopyrrolate 1 mg oral tablet: 1 tab(s) orally 3 times a day  HumaLOG KwikPen 100 units/mL injectable solution: 9 unit(s) subcutaneous 3 times a day take before meals three times a day  hydrALAZINE 25 mg oral tablet: 1 tab(s) orally every 8 hours  Incruse Ellipta 62.5 mcg/inh inhalation powder: 1 inhaled once a day  labetalol 100 mg oral tablet: 1 tab(s) orally every 8 hours  lacosamide 100 mg oral tablet: 1 tab(s) orally 2 times a day  levETIRAcetam 750 mg oral tablet, extended release: 2 tab(s) orally 2 times a day  methylPREDNISolone 8 mg oral tablet: 1 tab(s) orally once a day Methylprednisolone 32 mg PO from 10/15-10/16   Methylprednisolone 24 mg PO from 10/17-10/19  Methylprednisolone 16 mg PO from 10/20-10/22  methylPREDNISolone 8 mg oral tablet: 8 milligram(s) orally once a day Starting 10/23 onwards after taper  mexiletine 200 mg oral capsule: 1 cap(s) orally every 8 hours  montelukast 10 mg oral tablet: 1 tab(s) orally once a day  Multiple Vitamins oral tablet: 1 tab(s) orally once a day  NIFEdipine 30 mg oral tablet, extended release: 1 tab(s) orally once a day  pantoprazole 40 mg oral delayed release tablet: 1 tab(s) orally once a day  rosuvastatin 5 mg oral tablet: 1 tab(s) orally once a day  sertraline 50 mg oral tablet: 1 tab(s) orally once a day  valACYclovir 1 g oral tablet: 1 tab(s) orally 2 times a day   ascorbic acid 500 mg oral tablet: 1 tab(s) orally once a day  Basaglar KwikPen 100 units/mL subcutaneous solution: 30 unit(s) subcutaneous once a day (at bedtime)  Breo Ellipta 200 mcg-25 mcg/inh inhalation powder: 1 puff(s) inhaled once a day  budesonide 0.5 mg/2 mL inhalation suspension: 2 milliliter(s) by nebulizer 2 times a day  clopidogrel 75 mg oral tablet: 1 tab(s) orally once a day  DuoNeb 0.5 mg-2.5 mg/3 mL inhalation solution: 3 milliliter(s) by nebulizer 4 times a day  Eliquis 5 mg oral tablet: 1 tab(s) orally 2 times a day  ferrous sulfate 325 mg (65 mg elemental iron) oral tablet: 1 tab(s) orally once a day  formoterol 20 mcg/2 mL inhalation solution: 2 milliliter(s) inhaled every 12 hours  gabapentin 100 mg oral capsule: 1 cap(s) orally every 8 hours  glycopyrrolate 1 mg oral tablet: 1 tab(s) orally 3 times a day  HumaLOG KwikPen 100 units/mL injectable solution: 9 unit(s) subcutaneous 3 times a day take before meals three times a day  hydrALAZINE 25 mg oral tablet: 1 tab(s) orally every 8 hours  Incruse Ellipta 62.5 mcg/inh inhalation powder: 1 inhaled once a day  labetalol 100 mg oral tablet: 1 tab(s) orally every 8 hours  lacosamide 100 mg oral tablet: 1 tab(s) orally 2 times a day  levETIRAcetam 750 mg oral tablet, extended release: 2 tab(s) orally 2 times a day  methylPREDNISolone 8 mg oral tablet: 1 tab(s) orally once a day Methylprednisolone 32 mg PO from 10/16-10/17   Methylprednisolone 24 mg PO from 10/18-10/20  Methylprednisolone 16 mg PO from 10/21-10/23  methylPREDNISolone 8 mg oral tablet: 8 milligram(s) orally once a day Starting 10/24 onwards after taper  mexiletine 200 mg oral capsule: 1 cap(s) orally every 8 hours  montelukast 10 mg oral tablet: 1 tab(s) orally once a day  Multiple Vitamins oral tablet: 1 tab(s) orally once a day  NIFEdipine 30 mg oral tablet, extended release: 1 tab(s) orally once a day  pantoprazole 40 mg oral delayed release tablet: 1 tab(s) orally once a day  rosuvastatin 5 mg oral tablet: 1 tab(s) orally once a day  sertraline 50 mg oral tablet: 1 tab(s) orally once a day  valACYclovir 1 g oral tablet: 1 tab(s) orally 2 times a day   ascorbic acid 500 mg oral tablet: 1 tab(s) orally once a day  Basaglar KwikPen 100 units/mL subcutaneous solution: 30 unit(s) subcutaneous once a day (at bedtime)  Breo Ellipta 200 mcg-25 mcg/inh inhalation powder: 1 puff(s) inhaled once a day  budesonide 0.5 mg/2 mL inhalation suspension: 2 milliliter(s) by nebulizer 2 times a day  clopidogrel 75 mg oral tablet: 1 tab(s) orally once a day  DuoNeb 0.5 mg-2.5 mg/3 mL inhalation solution: 3 milliliter(s) by nebulizer 4 times a day  Eliquis 5 mg oral tablet: 1 tab(s) orally 2 times a day  ferrous sulfate 325 mg (65 mg elemental iron) oral tablet: 1 tab(s) orally once a day  formoterol 20 mcg/2 mL inhalation solution: 2 milliliter(s) inhaled every 12 hours  gabapentin 100 mg oral capsule: 1 cap(s) orally every 8 hours  glycopyrrolate 1 mg oral tablet: 1 tab(s) orally 3 times a day  HumaLOG KwikPen 100 units/mL injectable solution: 9 unit(s) subcutaneous 3 times a day take before meals three times a day  Incruse Ellipta 62.5 mcg/inh inhalation powder: 1 inhaled once a day  labetalol 100 mg oral tablet: 1 tab(s) orally every 8 hours  lacosamide 100 mg oral tablet: 1 tab(s) orally 2 times a day  levETIRAcetam 750 mg oral tablet, extended release: 2 tab(s) orally 2 times a day  methylPREDNISolone 8 mg oral tablet: 1 tab(s) orally once a day Methylprednisolone 32 mg PO from 10/16-10/17   Methylprednisolone 24 mg PO from 10/18-10/20  Methylprednisolone 16 mg PO from 10/21-10/23  Methylprednisolone 8 mg PO from 10/24 onwards  mexiletine 200 mg oral capsule: 1 cap(s) orally every 8 hours  montelukast 10 mg oral tablet: 1 tab(s) orally once a day  Multiple Vitamins oral tablet: 1 tab(s) orally once a day  NIFEdipine 30 mg oral tablet, extended release: 1 tab(s) orally once a day  pantoprazole 40 mg oral delayed release tablet: 1 tab(s) orally once a day  rosuvastatin 5 mg oral tablet: 1 tab(s) orally once a day  sertraline 50 mg oral tablet: 1 tab(s) orally once a day  valACYclovir 1 g oral tablet: 1 tab(s) orally 2 times a day   ascorbic acid 500 mg oral tablet: 1 tab(s) orally once a day  Basaglar KwikPen 100 units/mL subcutaneous solution: 40 unit(s) subcutaneous once a day (at bedtime) Please monitor your blood glucoses closely while on 40 units on lantus nightly. Please start tapering down to your home dose as you taper the steroids.  Breo Ellipta 200 mcg-25 mcg/inh inhalation powder: 1 puff(s) inhaled once a day  budesonide 0.5 mg/2 mL inhalation suspension: 2 milliliter(s) by nebulizer 2 times a day  clopidogrel 75 mg oral tablet: 1 tab(s) orally once a day  DuoNeb 0.5 mg-2.5 mg/3 mL inhalation solution: 3 milliliter(s) by nebulizer 4 times a day  Eliquis 5 mg oral tablet: 1 tab(s) orally 2 times a day  ferrous sulfate 325 mg (65 mg elemental iron) oral tablet: 1 tab(s) orally once a day  formoterol 20 mcg/2 mL inhalation solution: 2 milliliter(s) inhaled every 12 hours  gabapentin 100 mg oral capsule: 1 cap(s) orally every 8 hours  glycopyrrolate 1 mg oral tablet: 1 tab(s) orally 3 times a day  HumaLOG KwikPen 100 units/mL injectable solution: 9 unit(s) subcutaneous 3 times a day take before meals three times a day  Incruse Ellipta 62.5 mcg/inh inhalation powder: 1 inhaled once a day  labetalol 100 mg oral tablet: 1 tab(s) orally every 8 hours  lacosamide 100 mg oral tablet: 1 tab(s) orally 2 times a day  levETIRAcetam 750 mg oral tablet, extended release: 2 tab(s) orally 2 times a day  methylPREDNISolone 8 mg oral tablet: 1 tab(s) orally once a day Methylprednisolone 32 mg PO from 10/16-10/17   Methylprednisolone 24 mg PO from 10/18-10/20  Methylprednisolone 16 mg PO from 10/21-10/23  Methylprednisolone 8 mg PO from 10/24 onwards  mexiletine 200 mg oral capsule: 1 cap(s) orally every 8 hours  montelukast 10 mg oral tablet: 1 tab(s) orally once a day  Multiple Vitamins oral tablet: 1 tab(s) orally once a day  NIFEdipine 30 mg oral tablet, extended release: 1 tab(s) orally once a day  pantoprazole 40 mg oral delayed release tablet: 1 tab(s) orally once a day  rosuvastatin 5 mg oral tablet: 1 tab(s) orally once a day  sertraline 50 mg oral tablet: 1 tab(s) orally once a day  valACYclovir 1 g oral tablet: 1 tab(s) orally 2 times a day   ascorbic acid 500 mg oral tablet: 1 tab(s) orally once a day  Basaglar KwikPen 100 units/mL subcutaneous solution: 40 unit(s) subcutaneous once a day (at bedtime) Please take only 25u lantus night of discharge 10/15. Please monitor your blood glucoses closely while on 40 units on lantus 10/16, 10/17  Please take 36units of lantus 10/18, 10/19, 10/20.  Please take 33units of lantus 10/21, 10/22,10/23  Please take 30units of lantus 10/24 onwards.  Please see your endocrinologist or come to the ED if any signs of low blood sugar  Breo Ellipta 200 mcg-25 mcg/inh inhalation powder: 1 puff(s) inhaled once a day  budesonide 0.5 mg/2 mL inhalation suspension: 2 milliliter(s) by nebulizer 2 times a day  clopidogrel 75 mg oral tablet: 1 tab(s) orally once a day  DuoNeb 0.5 mg-2.5 mg/3 mL inhalation solution: 3 milliliter(s) by nebulizer 4 times a day  Eliquis 5 mg oral tablet: 1 tab(s) orally 2 times a day  ferrous sulfate 325 mg (65 mg elemental iron) oral tablet: 1 tab(s) orally once a day  formoterol 20 mcg/2 mL inhalation solution: 2 milliliter(s) inhaled every 12 hours  gabapentin 100 mg oral capsule: 1 cap(s) orally every 8 hours  glycopyrrolate 1 mg oral tablet: 1 tab(s) orally 3 times a day  HumaLOG KwikPen 100 units/mL injectable solution: 9 unit(s) subcutaneous 3 times a day take before meals three times a day  Incruse Ellipta 62.5 mcg/inh inhalation powder: 1 inhaled once a day  labetalol 100 mg oral tablet: 1 tab(s) orally every 8 hours  lacosamide 100 mg oral tablet: 1 tab(s) orally 2 times a day  levETIRAcetam 750 mg oral tablet, extended release: 2 tab(s) orally 2 times a day  methylPREDNISolone 8 mg oral tablet: 1 tab(s) orally once a day Methylprednisolone 32 mg PO from 10/16-10/17   Methylprednisolone 24 mg PO from 10/18-10/20  Methylprednisolone 16 mg PO from 10/21-10/23  Methylprednisolone 8 mg PO from 10/24 onwards  mexiletine 200 mg oral capsule: 1 cap(s) orally every 8 hours  montelukast 10 mg oral tablet: 1 tab(s) orally once a day  Multiple Vitamins oral tablet: 1 tab(s) orally once a day  NIFEdipine 30 mg oral tablet, extended release: 1 tab(s) orally once a day  pantoprazole 40 mg oral delayed release tablet: 1 tab(s) orally once a day  rosuvastatin 5 mg oral tablet: 1 tab(s) orally once a day  sertraline 50 mg oral tablet: 1 tab(s) orally once a day  valACYclovir 1 g oral tablet: 1 tab(s) orally 2 times a day   ascorbic acid 500 mg oral tablet: 1 tab(s) orally once a day  Basaglar KwikPen 100 units/mL subcutaneous solution: 30 unit(s) subcutaneous 2 times a day Please take only 18u lantus night of discharge 10/15  Please take 30u in the AM and 18units in the PM while on the taper  Please adjust premeal sugars as needed. If low sugars, feed the patient juice or a sugary substance immediately.    Please call your endocrinologist if recurrent signs of low blood sugars or &gt;350  Breo Ellipta 200 mcg-25 mcg/inh inhalation powder: 1 puff(s) inhaled once a day  budesonide 0.5 mg/2 mL inhalation suspension: 2 milliliter(s) by nebulizer 2 times a day  clopidogrel 75 mg oral tablet: 1 tab(s) orally once a day  DuoNeb 0.5 mg-2.5 mg/3 mL inhalation solution: 3 milliliter(s) by nebulizer 4 times a day  Eliquis 5 mg oral tablet: 1 tab(s) orally 2 times a day  ferrous sulfate 325 mg (65 mg elemental iron) oral tablet: 1 tab(s) orally once a day  formoterol 20 mcg/2 mL inhalation solution: 2 milliliter(s) inhaled every 12 hours  gabapentin 100 mg oral capsule: 1 cap(s) orally every 8 hours  glycopyrrolate 1 mg oral tablet: 1 tab(s) orally 3 times a day  HumaLOG KwikPen 100 units/mL injectable solution: 9 unit(s) subcutaneous 3 times a day take before meals three times a day  Incruse Ellipta 62.5 mcg/inh inhalation powder: 1 inhaled once a day  labetalol 100 mg oral tablet: 1 tab(s) orally every 8 hours  lacosamide 100 mg oral tablet: 1 tab(s) orally 2 times a day  levETIRAcetam 750 mg oral tablet, extended release: 2 tab(s) orally 2 times a day  methylPREDNISolone 8 mg oral tablet: 1 tab(s) orally once a day Methylprednisolone 32 mg PO from 10/16-10/17   Methylprednisolone 24 mg PO from 10/18-10/20  Methylprednisolone 16 mg PO from 10/21-10/23  Methylprednisolone 8 mg PO from 10/24 onwards  mexiletine 200 mg oral capsule: 1 cap(s) orally every 8 hours  montelukast 10 mg oral tablet: 1 tab(s) orally once a day  Multiple Vitamins oral tablet: 1 tab(s) orally once a day  NIFEdipine 30 mg oral tablet, extended release: 1 tab(s) orally once a day  pantoprazole 40 mg oral delayed release tablet: 1 tab(s) orally once a day  rosuvastatin 5 mg oral tablet: 1 tab(s) orally once a day  sertraline 50 mg oral tablet: 1 tab(s) orally once a day  valACYclovir 1 g oral tablet: 1 tab(s) orally 2 times a day   ascorbic acid 500 mg oral tablet: 1 tab(s) orally once a day  Basaglar KwikPen 100 units/mL subcutaneous solution: 30 unit(s) subcutaneous 2 times a day Please take only 18u lantus night of discharge 10/15  Please take 30u in the AM and 18units in the PM while on the taper  Please adjust premeal sugars as needed. If low sugars, feed the patient juice or a sugary substance immediately.    Please call your endocrinologist if recurrent signs of low blood sugars or &gt;350  Breo Ellipta 200 mcg-25 mcg/inh inhalation powder: 1 puff(s) inhaled once a day  budesonide 0.5 mg/2 mL inhalation suspension: 2 milliliter(s) by nebulizer 2 times a day  clopidogrel 75 mg oral tablet: 1 tab(s) orally once a day  DuoNeb 0.5 mg-2.5 mg/3 mL inhalation solution: 3 milliliter(s) by nebulizer 4 times a day  Eliquis 5 mg oral tablet: 1 tab(s) orally 2 times a day  ferrous sulfate 325 mg (65 mg elemental iron) oral tablet: 1 tab(s) orally once a day  formoterol 20 mcg/2 mL inhalation solution: 2 milliliter(s) inhaled every 12 hours  gabapentin 100 mg oral capsule: 1 cap(s) orally every 8 hours  glycopyrrolate 1 mg oral tablet: 1 tab(s) orally 3 times a day  HumaLOG KwikPen 100 units/mL injectable solution: 9 unit(s) subcutaneous 3 times a day take before meals three times a day  Incruse Ellipta 62.5 mcg/inh inhalation powder: 1 inhaled once a day  insulin glargine 100 units/mL subcutaneous solution: 18 unit(s) subcutaneous once a day (at bedtime)  labetalol 100 mg oral tablet: 1 tab(s) orally every 8 hours  lacosamide 100 mg oral tablet: 1 tab(s) orally 2 times a day  levETIRAcetam 750 mg oral tablet, extended release: 2 tab(s) orally 2 times a day  methylPREDNISolone 8 mg oral tablet: 1 tab(s) orally once a day Methylprednisolone 32 mg PO from 10/16-10/17   Methylprednisolone 24 mg PO from 10/18-10/20  Methylprednisolone 16 mg PO from 10/21-10/23  Methylprednisolone 8 mg PO from 10/24 onwards  mexiletine 200 mg oral capsule: 1 cap(s) orally every 8 hours  montelukast 10 mg oral tablet: 1 tab(s) orally once a day  Multiple Vitamins oral tablet: 1 tab(s) orally once a day  NIFEdipine 30 mg oral tablet, extended release: 1 tab(s) orally once a day  pantoprazole 40 mg oral delayed release tablet: 1 tab(s) orally once a day  rosuvastatin 5 mg oral tablet: 1 tab(s) orally once a day  sertraline 50 mg oral tablet: 1 tab(s) orally once a day  valACYclovir 1 g oral tablet: 1 tab(s) orally 2 times a day   ascorbic acid 500 mg oral tablet: 1 tab(s) orally once a day  Basaglar KwikPen 100 units/mL subcutaneous solution: 30 unit(s) subcutaneous once a day Please take only 18u lantus night of discharge 10/15  Please take 30u in the AM and 18units in the PM while on the taper  Please adjust premeal sugars as needed. If low sugars, feed the patient juice or a sugary substance immediately.    Please call your endocrinologist if recurrent signs of low blood sugars or &gt;350  Breo Ellipta 200 mcg-25 mcg/inh inhalation powder: 1 puff(s) inhaled once a day  budesonide 0.5 mg/2 mL inhalation suspension: 2 milliliter(s) by nebulizer 2 times a day  clopidogrel 75 mg oral tablet: 1 tab(s) orally once a day  DuoNeb 0.5 mg-2.5 mg/3 mL inhalation solution: 3 milliliter(s) by nebulizer 4 times a day  Eliquis 5 mg oral tablet: 1 tab(s) orally 2 times a day  ferrous sulfate 325 mg (65 mg elemental iron) oral tablet: 1 tab(s) orally once a day  formoterol 20 mcg/2 mL inhalation solution: 2 milliliter(s) inhaled every 12 hours  gabapentin 100 mg oral capsule: 1 cap(s) orally every 8 hours  glycopyrrolate 1 mg oral tablet: 1 tab(s) orally 3 times a day  HumaLOG KwikPen 100 units/mL injectable solution: 9 unit(s) subcutaneous 3 times a day take before meals three times a day  Incruse Ellipta 62.5 mcg/inh inhalation powder: 1 inhaled once a day  insulin glargine 100 units/mL subcutaneous solution: 18 unit(s) subcutaneous once a day (at bedtime)  labetalol 100 mg oral tablet: 1 tab(s) orally every 8 hours  lacosamide 100 mg oral tablet: 1 tab(s) orally 2 times a day  levETIRAcetam 750 mg oral tablet, extended release: 2 tab(s) orally 2 times a day  methylPREDNISolone 8 mg oral tablet: 1 tab(s) orally once a day Methylprednisolone 32 mg PO from 10/16-10/17   Methylprednisolone 24 mg PO from 10/18-10/20  Methylprednisolone 16 mg PO from 10/21-10/23  Methylprednisolone 8 mg PO from 10/24 onwards  mexiletine 200 mg oral capsule: 1 cap(s) orally every 8 hours  montelukast 10 mg oral tablet: 1 tab(s) orally once a day  Multiple Vitamins oral tablet: 1 tab(s) orally once a day  NIFEdipine 30 mg oral tablet, extended release: 1 tab(s) orally once a day  pantoprazole 40 mg oral delayed release tablet: 1 tab(s) orally once a day  rosuvastatin 5 mg oral tablet: 1 tab(s) orally once a day  sertraline 50 mg oral tablet: 1 tab(s) orally once a day  valACYclovir 1 g oral tablet: 1 tab(s) orally 2 times a day

## 2024-10-15 NOTE — PROGRESS NOTE ADULT - PROBLEM SELECTOR PLAN 4
Hemoglobin 10.1 on admission. Hemoglobin 11.6 a couple of months ago  No active signs of bleeding   -Obtain iron studies  -Trend with CBC daily
Hemoglobin 10.1 on admission. Hemoglobin 11.6 a couple of months ago  No active signs of bleeding   -Obtain iron studies  -Trend with CBC daily
Hemoglobin 10.1 on admission. Hemoglobin 11.6 a couple of months ago  No active signs of bleeding. Low iron. Normal TIBC and ferritin   -Trend with CBC daily
Hemoglobin 10.1 on admission. Hemoglobin 11.6 a couple of months ago  No active signs of bleeding   -Obtain iron studies  -Trend with CBC daily
Hemoglobin 10.1 on admission. Hemoglobin 11.6 a couple of months ago  No active signs of bleeding. Low iron. Normal TIBC and ferritin   -Trend with CBC daily
Hemoglobin 10.1 on admission. Hemoglobin 11.6 a couple of months ago  No active signs of bleeding   -Obtain iron studies  -Trend with CBC daily

## 2024-10-16 NOTE — DIETITIAN NUTRITION RISK NOTIFICATION - PHYSICAL ASSESSMENT SHOULDERS
New Patient Office Visit  PCP: Kenneth Plaza MD    REASON FOR VISIT: Choledocholithiasis, S/P ERCP and pancreatic stent placement    HISTORY OF PRESENT ILLNESS: Patient is a 83-year-old male presenting as he was being seen locally in follow-up of an ERCP procedure completed by  May 2024.  In review of procedure reportAfter patient presented with acute symptoms of hypertensionLethargy confusionAbdominal discomfort and abnormalCT and then MRI with MRCP imagingAn ERCP was pursued.Patient had a periampullary diverticulum.Pancreatic stenting was placedSubsequent needle-knife papillotomyWas required to facilitate decannulation of the bile ductAs well asBiliary sphincterotomyFollowing extraction of 4 retained common bile duct stones.Follow-up [facility] imaging shows successful passing of the pancreatic stent.Patient and wife presentTo clinic today stating noFurther GI concerns of nausea vomiting heartburn or indigestion.  Patient has been on chronicPantoprazole 40 mg scheduled twice daily after he struggled with a ruptured ulcer several years ago.He does report urgentLoose and periods of incontinent stool which can intermittently occur but there is no reported melena or hematochezia.His last colonoscopy was completed by Dr. Cifuentes in 2022 and several colon polyps were removed.  As far as patient and wife state there are no concerns in regards to any new or acute GI symptoms at this time.     PAST MEDICAL HISTORY:    Hypothyroidism                                                Vasovagal episode                               01/2012       Arthritis                                                       Comment: right thumb    Essential (primary) hypertension                              Macular degeneration of both eyes               03/23/2018    Basal cell carcinoma                                          Sleep apnea                                                     Comment: uses cpap    Gastroesophageal reflux  Cancer Mother         lung    Osteoarthritis Mother     Hypertension Mother     Cancer Father         prostate    Heart disease Father     Osteoarthritis Father     Hypertension Father     Cancer, Colon Father     Cancer Sister         pancreas    Osteoarthritis Sister     Hypertension Sister     Syncope Sister     Aneurysm Sister     Patient is unaware of any medical problems Sister     Cancer Brother         stomach    Osteoarthritis Brother     Hypertension Brother     Patient is unaware of any medical problems Brother     Patient is unaware of any medical problems Brother     Patient is unaware of any medical problems Maternal Grandmother     Patient is unaware of any medical problems Maternal Grandfather     Patient is unaware of any medical problems Paternal Grandmother     Patient is unaware of any medical problems Paternal Grandfather     Patient is unaware of any medical problems Other          Social History     Socioeconomic History    Marital status: /Civil Union     Spouse name: Not on file    Number of children: Not on file    Years of education: Not on file    Highest education level: Not on file   Occupational History    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[employer]   Tobacco Use    Smoking status: Former     Current packs/day: 0.00     Average packs/day: 2.0 packs/day for 46.0 years (92.0 ttl pk-yrs)     Types: Cigarettes     Start date: 1959     Quit date: 2005     Years since quittin.8    Smokeless tobacco: Never   Vaping Use    Vaping status: never used   Substance and Sexual Activity    Alcohol use: No    Drug use: No    Sexual activity: Not Currently   Other Topics Concern     Service Not Asked    Blood Transfusions Not Asked    Caffeine Concern Not Asked    Occupational Exposure Not Asked    Hobby Hazards Not Asked    Sleep Concern Not Asked    Stress Concern Not Asked    Weight Concern Not Asked    Special Diet Not Asked    Back Care Not Asked    Exercise Not Asked     Bike Helmet Not Asked    Seat Belt Yes    Self-Exams Not Asked   Social History Narrative    Not on file     Social Determinants of Health     Financial Resource Strain: Low Risk  (5/7/2024)    Financial Resource Strain     Unable to Get: None   Food Insecurity: Patient Declined (5/6/2024)    Food Insecurity     Worried about Food: Patient declined     Food is Gone: Patient declined   Transportation Needs: Not At Risk (5/7/2024)    Transportation Needs     Lack of Reliable Transportation: No   Physical Activity: Not on file   Stress: Not on file   Social Connections: Medium Risk (5/6/2024)    Social Connections     Social Connectivity: 1 or 2 times a week   Interpersonal Safety: Low Risk  (5/6/2024)    Interpersonal Safety     How often physically hurt: Never     How often insulted or talked down to: Never     How often threatened with harm: Never     How often scream or curse at: Never          REVIEW OF SYSTEMS  Review of Systems   Gastrointestinal:  Positive for diarrhea.       PHYSICAL EXAM  Physical Exam  Constitutional:       Appearance: Normal appearance.   Cardiovascular:      Rate and Rhythm: Normal rate.   Pulmonary:      Effort: Pulmonary effort is normal.   Neurological:      Mental Status: He is alert.      Comments: Alzheimer's, forgetful   Psychiatric:         Mood and Affect: Mood normal.         Behavior: Behavior normal.         Thought Content: Thought content normal.         Judgment: Judgment normal.     LABS:  WBC (K/mcL)   [id number]  Signed on Workstation ID: [id number]    [id number]  Signed on Workstation ID: [id number]           moderate

## 2024-10-17 ENCOUNTER — APPOINTMENT (OUTPATIENT)
Dept: PULMONOLOGY | Facility: CLINIC | Age: 74
End: 2024-10-17
Payer: MEDICARE

## 2024-10-17 DIAGNOSIS — J45.50 SEVERE PERSISTENT ASTHMA, UNCOMPLICATED: ICD-10-CM

## 2024-10-17 DIAGNOSIS — J18.9 PNEUMONIA, UNSPECIFIED ORGANISM: ICD-10-CM

## 2024-10-17 DIAGNOSIS — J44.9 CHRONIC OBSTRUCTIVE PULMONARY DISEASE, UNSPECIFIED: ICD-10-CM

## 2024-10-17 PROCEDURE — 99496 TRANSJ CARE MGMT HIGH F2F 7D: CPT

## 2024-10-22 ENCOUNTER — NON-APPOINTMENT (OUTPATIENT)
Age: 74
End: 2024-10-22

## 2024-10-22 ENCOUNTER — APPOINTMENT (OUTPATIENT)
Dept: NEUROLOGY | Facility: CLINIC | Age: 74
End: 2024-10-22

## 2024-10-22 PROCEDURE — 99214 OFFICE O/P EST MOD 30 MIN: CPT | Mod: 95

## 2024-10-24 ENCOUNTER — NON-APPOINTMENT (OUTPATIENT)
Age: 74
End: 2024-10-24

## 2024-10-24 ENCOUNTER — APPOINTMENT (OUTPATIENT)
Dept: CARDIOLOGY | Facility: CLINIC | Age: 74
End: 2024-10-24
Payer: MEDICARE

## 2024-10-24 ENCOUNTER — APPOINTMENT (OUTPATIENT)
Dept: ELECTROPHYSIOLOGY | Facility: CLINIC | Age: 74
End: 2024-10-24
Payer: MEDICARE

## 2024-10-24 VITALS
BODY MASS INDEX: 24.8 KG/M2 | WEIGHT: 158 LBS | HEIGHT: 67 IN | DIASTOLIC BLOOD PRESSURE: 76 MMHG | OXYGEN SATURATION: 97 % | SYSTOLIC BLOOD PRESSURE: 133 MMHG | HEART RATE: 62 BPM

## 2024-10-24 DIAGNOSIS — Z51.81 ENCOUNTER FOR THERAPEUTIC DRUG LVL MONITORING: ICD-10-CM

## 2024-10-24 DIAGNOSIS — Z95.3 PRESENCE OF XENOGENIC HEART VALVE: ICD-10-CM

## 2024-10-24 DIAGNOSIS — Z79.01 ENCOUNTER FOR THERAPEUTIC DRUG LVL MONITORING: ICD-10-CM

## 2024-10-24 DIAGNOSIS — I10 ESSENTIAL (PRIMARY) HYPERTENSION: ICD-10-CM

## 2024-10-24 DIAGNOSIS — I49.3 VENTRICULAR PREMATURE DEPOLARIZATION: ICD-10-CM

## 2024-10-24 DIAGNOSIS — I48.0 PAROXYSMAL ATRIAL FIBRILLATION: ICD-10-CM

## 2024-10-24 DIAGNOSIS — R00.2 PALPITATIONS: ICD-10-CM

## 2024-10-24 DIAGNOSIS — E78.5 HYPERLIPIDEMIA, UNSPECIFIED: ICD-10-CM

## 2024-10-24 DIAGNOSIS — I48.91 UNSPECIFIED ATRIAL FIBRILLATION: ICD-10-CM

## 2024-10-24 DIAGNOSIS — I35.1 NONRHEUMATIC AORTIC (VALVE) INSUFFICIENCY: ICD-10-CM

## 2024-10-24 DIAGNOSIS — I71.9 AORTIC ANEURYSM OF UNSPECIFIED SITE, W/OUT RUPTURE: ICD-10-CM

## 2024-10-24 DIAGNOSIS — Z98.890 OTHER SPECIFIED POSTPROCEDURAL STATES: ICD-10-CM

## 2024-10-24 PROCEDURE — 93000 ELECTROCARDIOGRAM COMPLETE: CPT

## 2024-10-24 PROCEDURE — G2211 COMPLEX E/M VISIT ADD ON: CPT

## 2024-10-24 PROCEDURE — 99214 OFFICE O/P EST MOD 30 MIN: CPT

## 2024-10-29 ENCOUNTER — APPOINTMENT (OUTPATIENT)
Dept: INTERNAL MEDICINE | Facility: CLINIC | Age: 74
End: 2024-10-29

## 2024-10-29 ENCOUNTER — OUTPATIENT (OUTPATIENT)
Dept: OUTPATIENT SERVICES | Facility: HOSPITAL | Age: 74
LOS: 1 days | End: 2024-10-29

## 2024-10-29 VITALS
DIASTOLIC BLOOD PRESSURE: 60 MMHG | BODY MASS INDEX: 24.8 KG/M2 | HEART RATE: 66 BPM | WEIGHT: 158 LBS | SYSTOLIC BLOOD PRESSURE: 100 MMHG | OXYGEN SATURATION: 98 % | HEIGHT: 67 IN

## 2024-10-29 DIAGNOSIS — I10 ESSENTIAL (PRIMARY) HYPERTENSION: ICD-10-CM

## 2024-10-29 DIAGNOSIS — Z95.2 PRESENCE OF PROSTHETIC HEART VALVE: Chronic | ICD-10-CM

## 2024-10-29 DIAGNOSIS — Z98.89 OTHER SPECIFIED POSTPROCEDURAL STATES: Chronic | ICD-10-CM

## 2024-10-29 DIAGNOSIS — Z98.890 OTHER SPECIFIED POSTPROCEDURAL STATES: Chronic | ICD-10-CM

## 2024-10-29 DIAGNOSIS — Z96.653 PRESENCE OF ARTIFICIAL KNEE JOINT, BILATERAL: Chronic | ICD-10-CM

## 2024-10-29 DIAGNOSIS — H05.352 EXOSTOSIS OF LEFT ORBIT: Chronic | ICD-10-CM

## 2024-10-29 DIAGNOSIS — Z87.09 PERSONAL HISTORY OF OTHER DISEASES OF THE RESPIRATORY SYSTEM: Chronic | ICD-10-CM

## 2024-10-29 PROCEDURE — 99213 OFFICE O/P EST LOW 20 MIN: CPT

## 2024-10-29 PROCEDURE — G0463: CPT

## 2024-10-31 ENCOUNTER — APPOINTMENT (OUTPATIENT)
Dept: PULMONOLOGY | Facility: CLINIC | Age: 74
End: 2024-10-31
Payer: MEDICARE

## 2024-10-31 VITALS
SYSTOLIC BLOOD PRESSURE: 110 MMHG | DIASTOLIC BLOOD PRESSURE: 72 MMHG | WEIGHT: 156 LBS | RESPIRATION RATE: 16 BRPM | HEIGHT: 67 IN | TEMPERATURE: 97.1 F | HEART RATE: 75 BPM | OXYGEN SATURATION: 95 % | BODY MASS INDEX: 24.48 KG/M2

## 2024-10-31 DIAGNOSIS — J47.9 BRONCHIECTASIS, UNCOMPLICATED: ICD-10-CM

## 2024-10-31 DIAGNOSIS — R06.02 SHORTNESS OF BREATH: ICD-10-CM

## 2024-10-31 DIAGNOSIS — J45.50 SEVERE PERSISTENT ASTHMA, UNCOMPLICATED: ICD-10-CM

## 2024-10-31 DIAGNOSIS — J30.9 ALLERGIC RHINITIS, UNSPECIFIED: ICD-10-CM

## 2024-10-31 DIAGNOSIS — R93.89 ABNORMAL FINDINGS ON DIAGNOSTIC IMAGING OF OTHER SPECIFIED BODY STRUCTURES: ICD-10-CM

## 2024-10-31 DIAGNOSIS — E55.9 VITAMIN D DEFICIENCY, UNSPECIFIED: ICD-10-CM

## 2024-10-31 DIAGNOSIS — J39.8 OTHER SPECIFIED DISEASES OF UPPER RESPIRATORY TRACT: ICD-10-CM

## 2024-10-31 DIAGNOSIS — K21.9 GASTRO-ESOPHAGEAL REFLUX DISEASE W/OUT ESOPHAGITIS: ICD-10-CM

## 2024-10-31 DIAGNOSIS — J44.9 CHRONIC OBSTRUCTIVE PULMONARY DISEASE, UNSPECIFIED: ICD-10-CM

## 2024-10-31 PROCEDURE — 95012 NITRIC OXIDE EXP GAS DETER: CPT

## 2024-10-31 PROCEDURE — 96372 THER/PROPH/DIAG INJ SC/IM: CPT

## 2024-10-31 PROCEDURE — 99214 OFFICE O/P EST MOD 30 MIN: CPT | Mod: 25

## 2024-10-31 PROCEDURE — G0008: CPT

## 2024-10-31 PROCEDURE — 90662 IIV NO PRSV INCREASED AG IM: CPT

## 2024-10-31 RX ORDER — SODIUM CHLORIDE SOLN NEBU 7% 7 %
7 NEBU SOLN INHALATION
Qty: 240 | Refills: 5 | Status: ACTIVE | COMMUNITY
Start: 2024-10-31 | End: 1900-01-01

## 2024-10-31 RX ORDER — TEZEPELUMAB-EKKO 210 MG/1.9ML
210 INJECTION, SOLUTION SUBCUTANEOUS
Qty: 0 | Refills: 0 | Status: COMPLETED | OUTPATIENT
Start: 2024-10-31

## 2024-11-01 DIAGNOSIS — M54.12 RADICULOPATHY, CERVICAL REGION: ICD-10-CM

## 2024-11-01 RX ORDER — TRAMADOL HYDROCHLORIDE 50 MG/1
50 TABLET, COATED ORAL 3 TIMES DAILY
Qty: 30 | Refills: 0 | Status: ACTIVE | COMMUNITY
Start: 2024-11-01 | End: 1900-01-01

## 2024-11-01 RX ORDER — ROSUVASTATIN CALCIUM 5 MG/1
5 TABLET, FILM COATED ORAL DAILY
Qty: 90 | Refills: 1 | Status: ACTIVE | COMMUNITY
Start: 2024-11-01 | End: 1900-01-01

## 2024-11-04 ENCOUNTER — APPOINTMENT (OUTPATIENT)
Dept: INTERNAL MEDICINE | Facility: CLINIC | Age: 74
End: 2024-11-04

## 2024-11-04 ENCOUNTER — INPATIENT (INPATIENT)
Facility: HOSPITAL | Age: 74
LOS: 4 days | Discharge: HOME | DRG: 190 | End: 2024-11-09
Attending: INTERNAL MEDICINE | Admitting: HOSPITALIST
Payer: MEDICARE

## 2024-11-04 ENCOUNTER — OUTPATIENT (OUTPATIENT)
Dept: OUTPATIENT SERVICES | Facility: HOSPITAL | Age: 74
LOS: 1 days | End: 2024-11-04

## 2024-11-04 VITALS
OXYGEN SATURATION: 98 % | RESPIRATION RATE: 20 BRPM | SYSTOLIC BLOOD PRESSURE: 138 MMHG | WEIGHT: 166.01 LBS | HEART RATE: 76 BPM | HEIGHT: 67 IN | TEMPERATURE: 100 F | DIASTOLIC BLOOD PRESSURE: 68 MMHG

## 2024-11-04 DIAGNOSIS — Z98.890 OTHER SPECIFIED POSTPROCEDURAL STATES: Chronic | ICD-10-CM

## 2024-11-04 DIAGNOSIS — K62.5 HEMORRHAGE OF ANUS AND RECTUM: Chronic | ICD-10-CM

## 2024-11-04 DIAGNOSIS — Z98.89 OTHER SPECIFIED POSTPROCEDURAL STATES: Chronic | ICD-10-CM

## 2024-11-04 DIAGNOSIS — Z95.2 PRESENCE OF PROSTHETIC HEART VALVE: Chronic | ICD-10-CM

## 2024-11-04 DIAGNOSIS — I10 ESSENTIAL (PRIMARY) HYPERTENSION: ICD-10-CM

## 2024-11-04 DIAGNOSIS — Z96.653 PRESENCE OF ARTIFICIAL KNEE JOINT, BILATERAL: Chronic | ICD-10-CM

## 2024-11-04 DIAGNOSIS — H05.352 EXOSTOSIS OF LEFT ORBIT: Chronic | ICD-10-CM

## 2024-11-04 DIAGNOSIS — Z87.09 PERSONAL HISTORY OF OTHER DISEASES OF THE RESPIRATORY SYSTEM: Chronic | ICD-10-CM

## 2024-11-04 PROCEDURE — 99291 CRITICAL CARE FIRST HOUR: CPT | Mod: GC

## 2024-11-04 PROCEDURE — 99213 OFFICE O/P EST LOW 20 MIN: CPT

## 2024-11-04 RX ORDER — ERTAPENEM SODIUM 1 G/1
1000 INJECTION, POWDER, LYOPHILIZED, FOR SOLUTION INTRAMUSCULAR; INTRAVENOUS ONCE
Refills: 0 | Status: COMPLETED | OUTPATIENT
Start: 2024-11-04 | End: 2024-11-04

## 2024-11-04 RX ORDER — DIPHENHYDRAMINE HCL 12.5MG/5ML
25 ELIXIR ORAL ONCE
Refills: 0 | Status: COMPLETED | OUTPATIENT
Start: 2024-11-04 | End: 2024-11-04

## 2024-11-04 RX ORDER — ACETAMINOPHEN 500 MG/5ML
1000 LIQUID (ML) ORAL ONCE
Refills: 0 | Status: COMPLETED | OUTPATIENT
Start: 2024-11-04 | End: 2024-11-04

## 2024-11-04 RX ORDER — PREDNISONE 20 MG/1
20 TABLET ORAL
Qty: 26 | Refills: 1 | Status: ACTIVE | COMMUNITY
Start: 2024-11-01 | End: 1900-01-01

## 2024-11-04 RX ADMIN — Medication 400 MILLIGRAM(S): at 23:47

## 2024-11-04 NOTE — PATIENT PROFILE ADULT - FUNCTIONAL ASSESSMENT - DAILY ACTIVITY ASSESSMENT TYPE
Chief Complaint   Patient presents with    Thyroid Problem     PCP and pharmacy confirmed     History of Present Illness: Eugene Hi (Forest) is a 44 y.o. male here for follow up of thyroid.  Weight down 2 lbs since last visit in 4/24.  Has been under a lot of stress the past 6 months as his 5 yo daughter was diagnosed with B-cell leukemia and Hashimoto's and has been receiving chemo and levothyroxine.  He works for [employer] with [employer] and initially was told he had to return to the office but has been given a medical exemption.  He and his wife were headed towards a divorce but this has been put on hold.  He also developed a right 3rd finger tendon injury and athlete's foot that led to a bacterial infection and broke a tooth and had poison ivy.  With everything going on, he thinks he has likely missed more than 2 doses in the past 2 months.  Has noticed more fatigue and some nail ridging that typically happen when he is more hypothyroid.  We agreed to keep his dose the same and focus on compliance for 2 months before increasing his dose.      Current Outpatient Medications   Medication Sig    UNITHROID 125 MCG tablet Take 1 tablet by mouth daily BUT SKIP THE 1ST AND 15TH OF EACH MONTH--Dose change 11/04/24--updated med list--did not send prescription to the pharmacy    carvedilol (COREG) 6.25 MG tablet Take 1 tablet by mouth 2 times daily (with meals)    losartan (COZAAR) 50 MG tablet Take 1 tablet by mouth daily    desloratadine (CLARINEX) 5 MG tablet daily    Azelastine-Fluticasone 137-50 MCG/ACT SUSP daily    vitamin D3 (CHOLECALCIFEROL) 125 MCG (5000 UT) TABS tablet Take 1 tablet by mouth daily    clopidogrel (PLAVIX) 75 MG tablet Take 1 tablet by mouth daily    nitroGLYCERIN (NITROSTAT) 0.4 MG SL tablet Place 1 tablet under the tongue    rosuvastatin (CRESTOR) 20 MG tablet Take 1 tablet by mouth nightly     No current facility-administered medications for this visit.     Allergies   Allergen Reactions    
Admission

## 2024-11-05 DIAGNOSIS — J18.9 PNEUMONIA, UNSPECIFIED ORGANISM: ICD-10-CM

## 2024-11-05 LAB
ALBUMIN SERPL ELPH-MCNC: 3.7 G/DL — SIGNIFICANT CHANGE UP (ref 3.3–5)
ALP SERPL-CCNC: 80 U/L — SIGNIFICANT CHANGE UP (ref 40–120)
ALT FLD-CCNC: 18 U/L — SIGNIFICANT CHANGE UP (ref 10–45)
ANION GAP SERPL CALC-SCNC: 15 MMOL/L — SIGNIFICANT CHANGE UP (ref 5–17)
AST SERPL-CCNC: 24 U/L — SIGNIFICANT CHANGE UP (ref 10–40)
BASOPHILS # BLD AUTO: 0.08 K/UL — SIGNIFICANT CHANGE UP (ref 0–0.2)
BASOPHILS NFR BLD AUTO: 0.9 % — SIGNIFICANT CHANGE UP (ref 0–2)
BILIRUB SERPL-MCNC: 0.3 MG/DL — SIGNIFICANT CHANGE UP (ref 0.2–1.2)
BUN SERPL-MCNC: 8 MG/DL — SIGNIFICANT CHANGE UP (ref 7–23)
CALCIUM SERPL-MCNC: 8.7 MG/DL — SIGNIFICANT CHANGE UP (ref 8.4–10.5)
CHLORIDE SERPL-SCNC: 106 MMOL/L — SIGNIFICANT CHANGE UP (ref 96–108)
CO2 SERPL-SCNC: 23 MMOL/L — SIGNIFICANT CHANGE UP (ref 22–31)
CREAT SERPL-MCNC: 0.66 MG/DL — SIGNIFICANT CHANGE UP (ref 0.5–1.3)
EGFR: 91 ML/MIN/1.73M2 — SIGNIFICANT CHANGE UP
EGFR: 91 ML/MIN/1.73M2 — SIGNIFICANT CHANGE UP
EOSINOPHIL # BLD AUTO: 0 K/UL — SIGNIFICANT CHANGE UP (ref 0–0.5)
EOSINOPHIL NFR BLD AUTO: 0 % — SIGNIFICANT CHANGE UP (ref 0–6)
FLUAV AG NPH QL: SIGNIFICANT CHANGE UP
FLUBV AG NPH QL: SIGNIFICANT CHANGE UP
GAS PNL BLDV: SIGNIFICANT CHANGE UP
GIANT PLATELETS BLD QL SMEAR: PRESENT — SIGNIFICANT CHANGE UP
GLUCOSE BLDC GLUCOMTR-MCNC: 211 MG/DL — HIGH (ref 70–99)
GLUCOSE BLDC GLUCOMTR-MCNC: 336 MG/DL — HIGH (ref 70–99)
GLUCOSE BLDC GLUCOMTR-MCNC: 435 MG/DL — HIGH (ref 70–99)
GLUCOSE SERPL-MCNC: 79 MG/DL — SIGNIFICANT CHANGE UP (ref 70–99)
HCT VFR BLD CALC: 37.1 % — SIGNIFICANT CHANGE UP (ref 34.5–45)
HGB BLD-MCNC: 11 G/DL — LOW (ref 11.5–15.5)
LYMPHOCYTES # BLD AUTO: 2.26 K/UL — SIGNIFICANT CHANGE UP (ref 1–3.3)
LYMPHOCYTES # BLD AUTO: 24.6 % — SIGNIFICANT CHANGE UP (ref 13–44)
MANUAL SMEAR VERIFICATION: SIGNIFICANT CHANGE UP
MCHC RBC-ENTMCNC: 22.3 PG — LOW (ref 27–34)
MCHC RBC-ENTMCNC: 29.6 G/DL — LOW (ref 32–36)
MCV RBC AUTO: 75.1 FL — LOW (ref 80–100)
MONOCYTES # BLD AUTO: 1.61 K/UL — HIGH (ref 0–0.9)
MONOCYTES NFR BLD AUTO: 17.5 % — HIGH (ref 2–14)
NEUTROPHILS # BLD AUTO: 5.24 K/UL — SIGNIFICANT CHANGE UP (ref 1.8–7.4)
NEUTROPHILS NFR BLD AUTO: 57 % — SIGNIFICANT CHANGE UP (ref 43–77)
NIGHT BLUE STAIN TISS: SIGNIFICANT CHANGE UP
NT-PROBNP SERPL-SCNC: 319 PG/ML — HIGH (ref 0–300)
PLAT MORPH BLD: NORMAL — SIGNIFICANT CHANGE UP
PLATELET # BLD AUTO: 234 K/UL — SIGNIFICANT CHANGE UP (ref 150–400)
POTASSIUM SERPL-MCNC: 3.9 MMOL/L — SIGNIFICANT CHANGE UP (ref 3.5–5.3)
POTASSIUM SERPL-SCNC: 3.9 MMOL/L — SIGNIFICANT CHANGE UP (ref 3.5–5.3)
PROT SERPL-MCNC: 6.9 G/DL — SIGNIFICANT CHANGE UP (ref 6–8.3)
RBC # BLD: 4.94 M/UL — SIGNIFICANT CHANGE UP (ref 3.8–5.2)
RBC # FLD: 20.6 % — HIGH (ref 10.3–14.5)
RBC BLD AUTO: SIGNIFICANT CHANGE UP
RSV RNA NPH QL NAA+NON-PROBE: SIGNIFICANT CHANGE UP
SARS-COV-2 RNA SPEC QL NAA+PROBE: SIGNIFICANT CHANGE UP
SODIUM SERPL-SCNC: 144 MMOL/L — SIGNIFICANT CHANGE UP (ref 135–145)
SPECIMEN SOURCE: SIGNIFICANT CHANGE UP
TROPONIN T, HIGH SENSITIVITY RESULT: 35 NG/L — SIGNIFICANT CHANGE UP (ref 0–51)
TROPONIN T, HIGH SENSITIVITY RESULT: 41 NG/L — SIGNIFICANT CHANGE UP (ref 0–51)
WBC # BLD: 9.2 K/UL — SIGNIFICANT CHANGE UP (ref 3.8–10.5)
WBC # FLD AUTO: 9.2 K/UL — SIGNIFICANT CHANGE UP (ref 3.8–10.5)

## 2024-11-05 PROCEDURE — 99223 1ST HOSP IP/OBS HIGH 75: CPT

## 2024-11-05 PROCEDURE — 99223 1ST HOSP IP/OBS HIGH 75: CPT | Mod: FS,GC

## 2024-11-05 PROCEDURE — 71045 X-RAY EXAM CHEST 1 VIEW: CPT | Mod: 26

## 2024-11-05 RX ORDER — ATORVASTATIN CALCIUM 80 MG/1
20 TABLET, FILM COATED ORAL AT BEDTIME
Refills: 0 | Status: DISCONTINUED | OUTPATIENT
Start: 2024-11-05 | End: 2024-11-09

## 2024-11-05 RX ORDER — INSULIN LISPRO 100 U/ML
INJECTION, SOLUTION INTRAVENOUS; SUBCUTANEOUS
Refills: 0 | Status: DISCONTINUED | OUTPATIENT
Start: 2024-11-05 | End: 2024-11-05

## 2024-11-05 RX ORDER — LACOSAMIDE 10 MG/ML
1 SOLUTION ORAL
Refills: 0 | DISCHARGE

## 2024-11-05 RX ORDER — LEVETIRACETAM 10 MG/ML
1500 INJECTION, SOLUTION INTRAVENOUS
Refills: 0 | Status: DISCONTINUED | OUTPATIENT
Start: 2024-11-05 | End: 2024-11-09

## 2024-11-05 RX ORDER — IPRATROPIUM BROMIDE AND ALBUTEROL SULFATE .5; 2.5 MG/3ML; MG/3ML
3 SOLUTION RESPIRATORY (INHALATION) EVERY 6 HOURS
Refills: 0 | Status: DISCONTINUED | OUTPATIENT
Start: 2024-11-05 | End: 2024-11-09

## 2024-11-05 RX ORDER — SERTRALINE HYDROCHLORIDE 100 MG/1
1 TABLET, FILM COATED ORAL
Refills: 0 | DISCHARGE

## 2024-11-05 RX ORDER — LACOSAMIDE 150 MG/1
100 TABLET, FILM COATED ORAL
Refills: 0 | Status: DISCONTINUED | OUTPATIENT
Start: 2024-11-05 | End: 2024-11-09

## 2024-11-05 RX ORDER — INSULIN GLARGINE-YFGN 100 [IU]/ML
28 INJECTION, SOLUTION SUBCUTANEOUS ONCE
Refills: 0 | Status: COMPLETED | OUTPATIENT
Start: 2024-11-05 | End: 2024-11-05

## 2024-11-05 RX ORDER — SERTRALINE 100 MG/1
50 TABLET, FILM COATED ORAL DAILY
Refills: 0 | Status: DISCONTINUED | OUTPATIENT
Start: 2024-11-05 | End: 2024-11-09

## 2024-11-05 RX ORDER — FORMOTEROL FUMARATE 20 UG/2ML
2 SOLUTION RESPIRATORY (INHALATION)
Refills: 0 | DISCHARGE

## 2024-11-05 RX ORDER — SODIUM CHLORIDE 9 G/1000ML
1000 INJECTION, SOLUTION INTRAVENOUS
Refills: 0 | Status: DISCONTINUED | OUTPATIENT
Start: 2024-11-05 | End: 2024-11-09

## 2024-11-05 RX ORDER — ERTAPENEM SODIUM 1 G/1
1000 INJECTION, POWDER, LYOPHILIZED, FOR SOLUTION INTRAMUSCULAR; INTRAVENOUS EVERY 24 HOURS
Refills: 0 | Status: DISCONTINUED | OUTPATIENT
Start: 2024-11-06 | End: 2024-11-09

## 2024-11-05 RX ORDER — DEXTROMETHORPHAN HBR, GUAIFENESIN 200 MG/10ML
200 LIQUID ORAL EVERY 6 HOURS
Refills: 0 | Status: DISCONTINUED | OUTPATIENT
Start: 2024-11-05 | End: 2024-11-09

## 2024-11-05 RX ORDER — IPRATROPIUM BROMIDE AND ALBUTEROL SULFATE .5; 2.5 MG/3ML; MG/3ML
3 SOLUTION RESPIRATORY (INHALATION) ONCE
Refills: 0 | Status: COMPLETED | OUTPATIENT
Start: 2024-11-05 | End: 2024-11-05

## 2024-11-05 RX ORDER — DEXTROSE 50 % IN WATER 50 %
15 SYRINGE (ML) INTRAVENOUS ONCE
Refills: 0 | Status: DISCONTINUED | OUTPATIENT
Start: 2024-11-05 | End: 2024-11-09

## 2024-11-05 RX ORDER — INSULIN GLARGINE-YFGN 100 [IU]/ML
28 INJECTION, SOLUTION SUBCUTANEOUS EVERY MORNING
Refills: 0 | Status: DISCONTINUED | OUTPATIENT
Start: 2024-11-06 | End: 2024-11-06

## 2024-11-05 RX ORDER — INSULIN LISPRO 100 U/ML
INJECTION, SOLUTION INTRAVENOUS; SUBCUTANEOUS
Refills: 0 | Status: DISCONTINUED | OUTPATIENT
Start: 2024-11-05 | End: 2024-11-09

## 2024-11-05 RX ORDER — LEVETIRACETAM 1000 MG
2 TABLET ORAL
Refills: 0 | DISCHARGE

## 2024-11-05 RX ORDER — MEXILETINE HYDROCHLORIDE 250 MG/1
200 CAPSULE ORAL EVERY 8 HOURS
Refills: 0 | Status: DISCONTINUED | OUTPATIENT
Start: 2024-11-05 | End: 2024-11-09

## 2024-11-05 RX ORDER — DEXTROSE 50 % IN WATER 50 %
12.5 SYRINGE (ML) INTRAVENOUS ONCE
Refills: 0 | Status: DISCONTINUED | OUTPATIENT
Start: 2024-11-05 | End: 2024-11-09

## 2024-11-05 RX ORDER — GLYCOPYRROLATE 0.2 MG/ML
1 INJECTION INTRAMUSCULAR; INTRAVENOUS THREE TIMES A DAY
Refills: 0 | Status: DISCONTINUED | OUTPATIENT
Start: 2024-11-05 | End: 2024-11-09

## 2024-11-05 RX ORDER — TIOTROPIUM BROMIDE INHALATION SPRAY 3.12 UG/1
2 SPRAY, METERED RESPIRATORY (INHALATION) DAILY
Refills: 0 | Status: DISCONTINUED | OUTPATIENT
Start: 2024-11-05 | End: 2024-11-09

## 2024-11-05 RX ORDER — NIFEDIPINE 30 MG
30 TABLET, EXTENDED RELEASE 24 HR ORAL DAILY
Refills: 0 | Status: DISCONTINUED | OUTPATIENT
Start: 2024-11-05 | End: 2024-11-09

## 2024-11-05 RX ORDER — METHYLPREDNISOLONE ACETATE 80 MG/ML
30 INJECTION, SUSPENSION INTRA-ARTICULAR; INTRALESIONAL; INTRAMUSCULAR; SOFT TISSUE DAILY
Refills: 0 | Status: DISCONTINUED | OUTPATIENT
Start: 2024-11-05 | End: 2024-11-09

## 2024-11-05 RX ORDER — ALBUTEROL SULFATE 2.5 MG/3ML
2.5 VIAL, NEBULIZER (ML) INHALATION EVERY 6 HOURS
Refills: 0 | Status: DISCONTINUED | OUTPATIENT
Start: 2024-11-05 | End: 2024-11-05

## 2024-11-05 RX ORDER — METHYLPREDNISOLONE ACETATE 80 MG/ML
125 INJECTION, SUSPENSION INTRA-ARTICULAR; INTRALESIONAL; INTRAMUSCULAR; SOFT TISSUE ONCE
Refills: 0 | Status: COMPLETED | OUTPATIENT
Start: 2024-11-05 | End: 2024-11-05

## 2024-11-05 RX ORDER — GABAPENTIN 400 MG/1
100 CAPSULE ORAL EVERY 8 HOURS
Refills: 0 | Status: DISCONTINUED | OUTPATIENT
Start: 2024-11-05 | End: 2024-11-09

## 2024-11-05 RX ORDER — LABETALOL HYDROCHLORIDE 200 MG/1
100 TABLET, FILM COATED ORAL THREE TIMES A DAY
Refills: 0 | Status: DISCONTINUED | OUTPATIENT
Start: 2024-11-05 | End: 2024-11-09

## 2024-11-05 RX ORDER — DEXTROSE 50 % IN WATER 50 %
25 SYRINGE (ML) INTRAVENOUS ONCE
Refills: 0 | Status: DISCONTINUED | OUTPATIENT
Start: 2024-11-05 | End: 2024-11-09

## 2024-11-05 RX ORDER — MONTELUKAST SODIUM 10 MG/1
10 TABLET ORAL DAILY
Refills: 0 | Status: DISCONTINUED | OUTPATIENT
Start: 2024-11-05 | End: 2024-11-09

## 2024-11-05 RX ORDER — ALBUTEROL SULFATE 2.5 MG/3ML
2.5 VIAL, NEBULIZER (ML) INHALATION EVERY 6 HOURS
Refills: 0 | Status: DISCONTINUED | OUTPATIENT
Start: 2024-11-05 | End: 2024-11-06

## 2024-11-05 RX ORDER — BENZONATATE 100 MG
100 CAPSULE ORAL THREE TIMES A DAY
Refills: 0 | Status: DISCONTINUED | OUTPATIENT
Start: 2024-11-05 | End: 2024-11-09

## 2024-11-05 RX ORDER — INSULIN LISPRO 100 U/ML
INJECTION, SOLUTION INTRAVENOUS; SUBCUTANEOUS AT BEDTIME
Refills: 0 | Status: DISCONTINUED | OUTPATIENT
Start: 2024-11-05 | End: 2024-11-09

## 2024-11-05 RX ORDER — IPRATROPIUM BROMIDE AND ALBUTEROL SULFATE .5; 3 MG/3ML; MG/3ML
3 SOLUTION RESPIRATORY (INHALATION)
Refills: 0 | DISCHARGE

## 2024-11-05 RX ORDER — APIXABAN 2.5 MG/1
5 TABLET, FILM COATED ORAL EVERY 12 HOURS
Refills: 0 | Status: DISCONTINUED | OUTPATIENT
Start: 2024-11-05 | End: 2024-11-09

## 2024-11-05 RX ORDER — GLUCAGON 3 MG/1
1 POWDER NASAL ONCE
Refills: 0 | Status: DISCONTINUED | OUTPATIENT
Start: 2024-11-05 | End: 2024-11-09

## 2024-11-05 RX ORDER — SENNA 187 MG
2 TABLET ORAL AT BEDTIME
Refills: 0 | Status: DISCONTINUED | OUTPATIENT
Start: 2024-11-05 | End: 2024-11-09

## 2024-11-05 RX ORDER — ROSUVASTATIN CALCIUM 20 MG/1
1 TABLET, COATED ORAL
Refills: 0 | DISCHARGE

## 2024-11-05 RX ORDER — CLOPIDOGREL BISULFATE 75 MG/1
75 TABLET, FILM COATED ORAL DAILY
Refills: 0 | Status: DISCONTINUED | OUTPATIENT
Start: 2024-11-05 | End: 2024-11-09

## 2024-11-05 RX ORDER — INSULIN GLARGINE-YFGN 100 [IU]/ML
8 INJECTION, SOLUTION SUBCUTANEOUS AT BEDTIME
Refills: 0 | Status: DISCONTINUED | OUTPATIENT
Start: 2024-11-05 | End: 2024-11-06

## 2024-11-05 RX ADMIN — IPRATROPIUM BROMIDE AND ALBUTEROL SULFATE 3 MILLILITER(S): .5; 2.5 SOLUTION RESPIRATORY (INHALATION) at 15:26

## 2024-11-05 RX ADMIN — ATORVASTATIN CALCIUM 20 MILLIGRAM(S): 80 TABLET, FILM COATED ORAL at 21:34

## 2024-11-05 RX ADMIN — Medication 30 MILLIGRAM(S): at 15:27

## 2024-11-05 RX ADMIN — LEVETIRACETAM 1500 MILLIGRAM(S): 10 INJECTION, SOLUTION INTRAVENOUS at 15:27

## 2024-11-05 RX ADMIN — LABETALOL HYDROCHLORIDE 100 MILLIGRAM(S): 200 TABLET, FILM COATED ORAL at 21:34

## 2024-11-05 RX ADMIN — Medication 1 DOSE(S): at 15:29

## 2024-11-05 RX ADMIN — Medication 2 TABLET(S): at 21:34

## 2024-11-05 RX ADMIN — INSULIN GLARGINE-YFGN 8 UNIT(S): 100 INJECTION, SOLUTION SUBCUTANEOUS at 21:50

## 2024-11-05 RX ADMIN — MONTELUKAST SODIUM 10 MILLIGRAM(S): 10 TABLET ORAL at 15:27

## 2024-11-05 RX ADMIN — Medication 4 MILLILITER(S): at 15:52

## 2024-11-05 RX ADMIN — MEXILETINE HYDROCHLORIDE 200 MILLIGRAM(S): 250 CAPSULE ORAL at 15:26

## 2024-11-05 RX ADMIN — IPRATROPIUM BROMIDE AND ALBUTEROL SULFATE 3 MILLILITER(S): .5; 2.5 SOLUTION RESPIRATORY (INHALATION) at 01:25

## 2024-11-05 RX ADMIN — GLYCOPYRROLATE 1 MILLIGRAM(S): 0.2 INJECTION INTRAMUSCULAR; INTRAVENOUS at 21:50

## 2024-11-05 RX ADMIN — CLOPIDOGREL BISULFATE 75 MILLIGRAM(S): 75 TABLET, FILM COATED ORAL at 15:26

## 2024-11-05 RX ADMIN — GLYCOPYRROLATE 1 MILLIGRAM(S): 0.2 INJECTION INTRAMUSCULAR; INTRAVENOUS at 15:52

## 2024-11-05 RX ADMIN — Medication 500 MILLILITER(S): at 01:20

## 2024-11-05 RX ADMIN — APIXABAN 5 MILLIGRAM(S): 2.5 TABLET, FILM COATED ORAL at 15:29

## 2024-11-05 RX ADMIN — Medication 100 MILLIGRAM(S): at 21:34

## 2024-11-05 RX ADMIN — INSULIN LISPRO 8: 100 INJECTION, SOLUTION INTRAVENOUS; SUBCUTANEOUS at 15:29

## 2024-11-05 RX ADMIN — DEXTROMETHORPHAN HBR, GUAIFENESIN 200 MILLIGRAM(S): 200 LIQUID ORAL at 21:34

## 2024-11-05 RX ADMIN — IPRATROPIUM BROMIDE AND ALBUTEROL SULFATE 3 MILLILITER(S): .5; 2.5 SOLUTION RESPIRATORY (INHALATION) at 02:18

## 2024-11-05 RX ADMIN — GABAPENTIN 100 MILLIGRAM(S): 400 CAPSULE ORAL at 15:26

## 2024-11-05 RX ADMIN — LACOSAMIDE 100 MILLIGRAM(S): 150 TABLET, FILM COATED ORAL at 15:27

## 2024-11-05 RX ADMIN — IPRATROPIUM BROMIDE AND ALBUTEROL SULFATE 3 MILLILITER(S): .5; 2.5 SOLUTION RESPIRATORY (INHALATION) at 01:52

## 2024-11-05 RX ADMIN — METHYLPREDNISOLONE ACETATE 125 MILLIGRAM(S): 80 INJECTION, SUSPENSION INTRA-ARTICULAR; INTRALESIONAL; INTRAMUSCULAR; SOFT TISSUE at 01:25

## 2024-11-05 RX ADMIN — GABAPENTIN 100 MILLIGRAM(S): 400 CAPSULE ORAL at 21:34

## 2024-11-05 RX ADMIN — LABETALOL HYDROCHLORIDE 100 MILLIGRAM(S): 200 TABLET, FILM COATED ORAL at 15:26

## 2024-11-05 RX ADMIN — ERTAPENEM SODIUM 120 MILLIGRAM(S): 1 INJECTION, POWDER, LYOPHILIZED, FOR SOLUTION INTRAMUSCULAR; INTRAVENOUS at 01:19

## 2024-11-05 RX ADMIN — Medication 25 MILLIGRAM(S): at 01:19

## 2024-11-05 RX ADMIN — INSULIN GLARGINE-YFGN 28 UNIT(S): 100 INJECTION, SOLUTION SUBCUTANEOUS at 15:25

## 2024-11-05 RX ADMIN — MEXILETINE HYDROCHLORIDE 200 MILLIGRAM(S): 250 CAPSULE ORAL at 21:49

## 2024-11-06 LAB
ADD ON TEST-SPECIMEN IN LAB: SIGNIFICANT CHANGE UP
APPEARANCE UR: CLEAR — SIGNIFICANT CHANGE UP
BACTERIA # UR AUTO: NEGATIVE /HPF — SIGNIFICANT CHANGE UP
BILIRUB UR-MCNC: NEGATIVE — SIGNIFICANT CHANGE UP
CAST: 0 /LPF — SIGNIFICANT CHANGE UP (ref 0–4)
COLOR SPEC: YELLOW — SIGNIFICANT CHANGE UP
DIFF PNL FLD: NEGATIVE — SIGNIFICANT CHANGE UP
GLUCOSE BLDC GLUCOMTR-MCNC: 128 MG/DL — HIGH (ref 70–99)
GLUCOSE BLDC GLUCOMTR-MCNC: 179 MG/DL — HIGH (ref 70–99)
GLUCOSE BLDC GLUCOMTR-MCNC: 210 MG/DL — HIGH (ref 70–99)
GLUCOSE BLDC GLUCOMTR-MCNC: 305 MG/DL — HIGH (ref 70–99)
GLUCOSE UR QL: 500 MG/DL
KETONES UR-MCNC: NEGATIVE MG/DL — SIGNIFICANT CHANGE UP
LEGIONELLA AG UR QL: NEGATIVE — SIGNIFICANT CHANGE UP
LEUKOCYTE ESTERASE UR-ACNC: NEGATIVE — SIGNIFICANT CHANGE UP
MRSA PCR RESULT.: SIGNIFICANT CHANGE UP
NITRITE UR-MCNC: NEGATIVE — SIGNIFICANT CHANGE UP
PH UR: 6 — SIGNIFICANT CHANGE UP (ref 5–8)
PROT UR-MCNC: NEGATIVE MG/DL — SIGNIFICANT CHANGE UP
RAPID RVP RESULT: SIGNIFICANT CHANGE UP
RBC CASTS # UR COMP ASSIST: 1 /HPF — SIGNIFICANT CHANGE UP (ref 0–4)
S AUREUS DNA NOSE QL NAA+PROBE: DETECTED
S PNEUM AG UR QL: NEGATIVE — SIGNIFICANT CHANGE UP
SARS-COV-2 RNA SPEC QL NAA+PROBE: SIGNIFICANT CHANGE UP
SP GR SPEC: 1.02 — SIGNIFICANT CHANGE UP (ref 1–1.03)
SQUAMOUS # UR AUTO: 0 /HPF — SIGNIFICANT CHANGE UP (ref 0–5)
UROBILINOGEN FLD QL: 1 MG/DL — SIGNIFICANT CHANGE UP (ref 0.2–1)
WBC UR QL: 0 /HPF — SIGNIFICANT CHANGE UP (ref 0–5)

## 2024-11-06 PROCEDURE — 99233 SBSQ HOSP IP/OBS HIGH 50: CPT

## 2024-11-06 PROCEDURE — 71250 CT THORAX DX C-: CPT | Mod: 26

## 2024-11-06 PROCEDURE — 99232 SBSQ HOSP IP/OBS MODERATE 35: CPT

## 2024-11-06 RX ORDER — POLYETHYLENE GLYCOL 3350 17 G/17G
17 POWDER, FOR SOLUTION ORAL DAILY
Refills: 0 | Status: DISCONTINUED | OUTPATIENT
Start: 2024-11-06 | End: 2024-11-09

## 2024-11-06 RX ORDER — INSULIN GLARGINE-YFGN 100 [IU]/ML
10 INJECTION, SOLUTION SUBCUTANEOUS AT BEDTIME
Refills: 0 | Status: DISCONTINUED | OUTPATIENT
Start: 2024-11-06 | End: 2024-11-09

## 2024-11-06 RX ORDER — INSULIN GLARGINE-YFGN 100 [IU]/ML
30 INJECTION, SOLUTION SUBCUTANEOUS EVERY MORNING
Refills: 0 | Status: DISCONTINUED | OUTPATIENT
Start: 2024-11-07 | End: 2024-11-09

## 2024-11-06 RX ORDER — B1/B2/B3/B5/B6/B12/VIT C/FOLIC 500-0.5 MG
1 TABLET ORAL DAILY
Refills: 0 | Status: DISCONTINUED | OUTPATIENT
Start: 2024-11-06 | End: 2024-11-09

## 2024-11-06 RX ADMIN — APIXABAN 5 MILLIGRAM(S): 2.5 TABLET, FILM COATED ORAL at 05:34

## 2024-11-06 RX ADMIN — Medication 40 MILLIGRAM(S): at 06:04

## 2024-11-06 RX ADMIN — ATORVASTATIN CALCIUM 20 MILLIGRAM(S): 80 TABLET, FILM COATED ORAL at 21:34

## 2024-11-06 RX ADMIN — INSULIN GLARGINE-YFGN 10 UNIT(S): 100 INJECTION, SOLUTION SUBCUTANEOUS at 21:51

## 2024-11-06 RX ADMIN — GLYCOPYRROLATE 1 MILLIGRAM(S): 0.2 INJECTION INTRAMUSCULAR; INTRAVENOUS at 21:35

## 2024-11-06 RX ADMIN — GABAPENTIN 100 MILLIGRAM(S): 400 CAPSULE ORAL at 05:34

## 2024-11-06 RX ADMIN — GLYCOPYRROLATE 1 MILLIGRAM(S): 0.2 INJECTION INTRAMUSCULAR; INTRAVENOUS at 14:01

## 2024-11-06 RX ADMIN — INSULIN GLARGINE-YFGN 28 UNIT(S): 100 INJECTION, SOLUTION SUBCUTANEOUS at 08:45

## 2024-11-06 RX ADMIN — Medication 30 MILLIGRAM(S): at 05:34

## 2024-11-06 RX ADMIN — LACOSAMIDE 100 MILLIGRAM(S): 150 TABLET, FILM COATED ORAL at 17:48

## 2024-11-06 RX ADMIN — Medication 2.5 MILLIGRAM(S): at 05:35

## 2024-11-06 RX ADMIN — Medication 4 MILLILITER(S): at 17:50

## 2024-11-06 RX ADMIN — SERTRALINE 50 MILLIGRAM(S): 100 TABLET, FILM COATED ORAL at 13:58

## 2024-11-06 RX ADMIN — GLYCOPYRROLATE 1 MILLIGRAM(S): 0.2 INJECTION INTRAMUSCULAR; INTRAVENOUS at 05:33

## 2024-11-06 RX ADMIN — IPRATROPIUM BROMIDE AND ALBUTEROL SULFATE 3 MILLILITER(S): .5; 2.5 SOLUTION RESPIRATORY (INHALATION) at 17:50

## 2024-11-06 RX ADMIN — LACOSAMIDE 100 MILLIGRAM(S): 150 TABLET, FILM COATED ORAL at 05:34

## 2024-11-06 RX ADMIN — CLOPIDOGREL BISULFATE 75 MILLIGRAM(S): 75 TABLET, FILM COATED ORAL at 13:59

## 2024-11-06 RX ADMIN — Medication 2.5 MILLIGRAM(S): at 00:55

## 2024-11-06 RX ADMIN — MEXILETINE HYDROCHLORIDE 200 MILLIGRAM(S): 250 CAPSULE ORAL at 21:34

## 2024-11-06 RX ADMIN — POLYETHYLENE GLYCOL 3350 17 GRAM(S): 17 POWDER, FOR SOLUTION ORAL at 17:48

## 2024-11-06 RX ADMIN — Medication 100 MILLIGRAM(S): at 05:34

## 2024-11-06 RX ADMIN — Medication 2 TABLET(S): at 21:34

## 2024-11-06 RX ADMIN — Medication 500 MILLIGRAM(S): at 13:59

## 2024-11-06 RX ADMIN — GABAPENTIN 100 MILLIGRAM(S): 400 CAPSULE ORAL at 14:06

## 2024-11-06 RX ADMIN — MONTELUKAST SODIUM 10 MILLIGRAM(S): 10 TABLET ORAL at 14:00

## 2024-11-06 RX ADMIN — LABETALOL HYDROCHLORIDE 100 MILLIGRAM(S): 200 TABLET, FILM COATED ORAL at 21:35

## 2024-11-06 RX ADMIN — Medication 2.5 MILLIGRAM(S): at 14:00

## 2024-11-06 RX ADMIN — Medication 4 MILLILITER(S): at 05:35

## 2024-11-06 RX ADMIN — Medication 1 DOSE(S): at 05:35

## 2024-11-06 RX ADMIN — MEXILETINE HYDROCHLORIDE 200 MILLIGRAM(S): 250 CAPSULE ORAL at 05:34

## 2024-11-06 RX ADMIN — APIXABAN 5 MILLIGRAM(S): 2.5 TABLET, FILM COATED ORAL at 17:49

## 2024-11-06 RX ADMIN — GABAPENTIN 100 MILLIGRAM(S): 400 CAPSULE ORAL at 21:34

## 2024-11-06 RX ADMIN — IPRATROPIUM BROMIDE AND ALBUTEROL SULFATE 3 MILLILITER(S): .5; 2.5 SOLUTION RESPIRATORY (INHALATION) at 05:35

## 2024-11-06 RX ADMIN — INSULIN LISPRO 4: 100 INJECTION, SOLUTION INTRAVENOUS; SUBCUTANEOUS at 17:50

## 2024-11-06 RX ADMIN — MEXILETINE HYDROCHLORIDE 200 MILLIGRAM(S): 250 CAPSULE ORAL at 14:01

## 2024-11-06 RX ADMIN — INSULIN LISPRO 8: 100 INJECTION, SOLUTION INTRAVENOUS; SUBCUTANEOUS at 12:57

## 2024-11-06 RX ADMIN — INSULIN LISPRO 2: 100 INJECTION, SOLUTION INTRAVENOUS; SUBCUTANEOUS at 08:44

## 2024-11-06 RX ADMIN — Medication 100 MILLIGRAM(S): at 21:35

## 2024-11-06 RX ADMIN — LABETALOL HYDROCHLORIDE 100 MILLIGRAM(S): 200 TABLET, FILM COATED ORAL at 14:01

## 2024-11-06 RX ADMIN — IPRATROPIUM BROMIDE AND ALBUTEROL SULFATE 3 MILLILITER(S): .5; 2.5 SOLUTION RESPIRATORY (INHALATION) at 00:55

## 2024-11-06 RX ADMIN — Medication 1 TABLET(S): at 17:48

## 2024-11-06 RX ADMIN — IPRATROPIUM BROMIDE AND ALBUTEROL SULFATE 3 MILLILITER(S): .5; 2.5 SOLUTION RESPIRATORY (INHALATION) at 13:58

## 2024-11-06 RX ADMIN — LEVETIRACETAM 1500 MILLIGRAM(S): 10 INJECTION, SOLUTION INTRAVENOUS at 05:33

## 2024-11-06 RX ADMIN — Medication 100 MILLIGRAM(S): at 14:02

## 2024-11-06 RX ADMIN — Medication 1 DOSE(S): at 17:50

## 2024-11-06 RX ADMIN — LEVETIRACETAM 1500 MILLIGRAM(S): 10 INJECTION, SOLUTION INTRAVENOUS at 17:49

## 2024-11-06 RX ADMIN — METHYLPREDNISOLONE ACETATE 30 MILLIGRAM(S): 80 INJECTION, SUSPENSION INTRA-ARTICULAR; INTRALESIONAL; INTRAMUSCULAR; SOFT TISSUE at 05:34

## 2024-11-06 RX ADMIN — ERTAPENEM SODIUM 120 MILLIGRAM(S): 1 INJECTION, POWDER, LYOPHILIZED, FOR SOLUTION INTRAMUSCULAR; INTRAVENOUS at 00:23

## 2024-11-07 LAB
ANION GAP SERPL CALC-SCNC: 11 MMOL/L — SIGNIFICANT CHANGE UP (ref 5–17)
BASOPHILS # BLD AUTO: 0.02 K/UL — SIGNIFICANT CHANGE UP (ref 0–0.2)
BASOPHILS NFR BLD AUTO: 0.2 % — SIGNIFICANT CHANGE UP (ref 0–2)
BUN SERPL-MCNC: 14 MG/DL — SIGNIFICANT CHANGE UP (ref 7–23)
CALCIUM SERPL-MCNC: 9 MG/DL — SIGNIFICANT CHANGE UP (ref 8.4–10.5)
CHLORIDE SERPL-SCNC: 105 MMOL/L — SIGNIFICANT CHANGE UP (ref 96–108)
CO2 SERPL-SCNC: 26 MMOL/L — SIGNIFICANT CHANGE UP (ref 22–31)
CREAT SERPL-MCNC: 0.65 MG/DL — SIGNIFICANT CHANGE UP (ref 0.5–1.3)
CULTURE RESULTS: SIGNIFICANT CHANGE UP
EGFR: 91 ML/MIN/1.73M2 — SIGNIFICANT CHANGE UP
EGFR: 91 ML/MIN/1.73M2 — SIGNIFICANT CHANGE UP
EOSINOPHIL # BLD AUTO: 0.14 K/UL — SIGNIFICANT CHANGE UP (ref 0–0.5)
EOSINOPHIL NFR BLD AUTO: 1.6 % — SIGNIFICANT CHANGE UP (ref 0–6)
GLUCOSE BLDC GLUCOMTR-MCNC: 173 MG/DL — HIGH (ref 70–99)
GLUCOSE BLDC GLUCOMTR-MCNC: 217 MG/DL — HIGH (ref 70–99)
GLUCOSE BLDC GLUCOMTR-MCNC: 289 MG/DL — HIGH (ref 70–99)
GLUCOSE BLDC GLUCOMTR-MCNC: 88 MG/DL — SIGNIFICANT CHANGE UP (ref 70–99)
GLUCOSE SERPL-MCNC: 132 MG/DL — HIGH (ref 70–99)
GRAM STN FLD: ABNORMAL
HCT VFR BLD CALC: 32 % — LOW (ref 34.5–45)
HGB BLD-MCNC: 9.6 G/DL — LOW (ref 11.5–15.5)
IMM GRANULOCYTES NFR BLD AUTO: 0.9 % — SIGNIFICANT CHANGE UP (ref 0–0.9)
LYMPHOCYTES # BLD AUTO: 3.15 K/UL — SIGNIFICANT CHANGE UP (ref 1–3.3)
LYMPHOCYTES # BLD AUTO: 35 % — SIGNIFICANT CHANGE UP (ref 13–44)
MCHC RBC-ENTMCNC: 22.4 PG — LOW (ref 27–34)
MCHC RBC-ENTMCNC: 30 G/DL — LOW (ref 32–36)
MCV RBC AUTO: 74.6 FL — LOW (ref 80–100)
MONOCYTES # BLD AUTO: 1.17 K/UL — HIGH (ref 0–0.9)
MONOCYTES NFR BLD AUTO: 13 % — SIGNIFICANT CHANGE UP (ref 2–14)
NEUTROPHILS # BLD AUTO: 4.44 K/UL — SIGNIFICANT CHANGE UP (ref 1.8–7.4)
NEUTROPHILS NFR BLD AUTO: 49.3 % — SIGNIFICANT CHANGE UP (ref 43–77)
NRBC # BLD: 0 /100 WBCS — SIGNIFICANT CHANGE UP (ref 0–0)
NRBC BLD-RTO: 0 /100 WBCS — SIGNIFICANT CHANGE UP (ref 0–0)
PLATELET # BLD AUTO: 286 K/UL — SIGNIFICANT CHANGE UP (ref 150–400)
POTASSIUM SERPL-MCNC: 4.1 MMOL/L — SIGNIFICANT CHANGE UP (ref 3.5–5.3)
POTASSIUM SERPL-SCNC: 4.1 MMOL/L — SIGNIFICANT CHANGE UP (ref 3.5–5.3)
RBC # BLD: 4.29 M/UL — SIGNIFICANT CHANGE UP (ref 3.8–5.2)
RBC # FLD: 20.1 % — HIGH (ref 10.3–14.5)
SODIUM SERPL-SCNC: 142 MMOL/L — SIGNIFICANT CHANGE UP (ref 135–145)
SPECIMEN SOURCE: SIGNIFICANT CHANGE UP
SPECIMEN SOURCE: SIGNIFICANT CHANGE UP
T4 FREE SERPL-MCNC: 1.4 NG/DL — SIGNIFICANT CHANGE UP (ref 0.9–1.8)
TSH SERPL-MCNC: 2.65 UIU/ML — SIGNIFICANT CHANGE UP (ref 0.27–4.2)
WBC # BLD: 9 K/UL — SIGNIFICANT CHANGE UP (ref 3.8–10.5)
WBC # FLD AUTO: 9 K/UL — SIGNIFICANT CHANGE UP (ref 3.8–10.5)

## 2024-11-07 PROCEDURE — 99232 SBSQ HOSP IP/OBS MODERATE 35: CPT

## 2024-11-07 PROCEDURE — 99233 SBSQ HOSP IP/OBS HIGH 50: CPT

## 2024-11-07 RX ORDER — INSULIN LISPRO 100 U/ML
4 INJECTION, SOLUTION INTRAVENOUS; SUBCUTANEOUS
Refills: 0 | Status: DISCONTINUED | OUTPATIENT
Start: 2024-11-07 | End: 2024-11-09

## 2024-11-07 RX ORDER — DIPHENHYDRAMINE HCL 12.5MG/5ML
25 ELIXIR ORAL ONCE
Refills: 0 | Status: DISCONTINUED | OUTPATIENT
Start: 2024-11-07 | End: 2024-11-07

## 2024-11-07 RX ORDER — TRAMADOL HYDROCHLORIDE 50 MG/1
25 TABLET, FILM COATED ORAL
Refills: 0 | Status: DISCONTINUED | OUTPATIENT
Start: 2024-11-07 | End: 2024-11-09

## 2024-11-07 RX ORDER — DIPHENHYDRAMINE HCL 12.5MG/5ML
50 ELIXIR ORAL ONCE
Refills: 0 | Status: COMPLETED | OUTPATIENT
Start: 2024-11-07 | End: 2024-11-07

## 2024-11-07 RX ADMIN — LABETALOL HYDROCHLORIDE 100 MILLIGRAM(S): 200 TABLET, FILM COATED ORAL at 13:05

## 2024-11-07 RX ADMIN — MONTELUKAST SODIUM 10 MILLIGRAM(S): 10 TABLET ORAL at 12:01

## 2024-11-07 RX ADMIN — Medication 4 MILLILITER(S): at 05:24

## 2024-11-07 RX ADMIN — MEXILETINE HYDROCHLORIDE 200 MILLIGRAM(S): 250 CAPSULE ORAL at 21:23

## 2024-11-07 RX ADMIN — Medication 500 MILLIGRAM(S): at 12:00

## 2024-11-07 RX ADMIN — METHYLPREDNISOLONE ACETATE 30 MILLIGRAM(S): 80 INJECTION, SUSPENSION INTRA-ARTICULAR; INTRALESIONAL; INTRAMUSCULAR; SOFT TISSUE at 05:23

## 2024-11-07 RX ADMIN — IPRATROPIUM BROMIDE AND ALBUTEROL SULFATE 3 MILLILITER(S): .5; 2.5 SOLUTION RESPIRATORY (INHALATION) at 11:59

## 2024-11-07 RX ADMIN — Medication 1 DOSE(S): at 05:24

## 2024-11-07 RX ADMIN — INSULIN LISPRO 2: 100 INJECTION, SOLUTION INTRAVENOUS; SUBCUTANEOUS at 09:10

## 2024-11-07 RX ADMIN — IPRATROPIUM BROMIDE AND ALBUTEROL SULFATE 3 MILLILITER(S): .5; 2.5 SOLUTION RESPIRATORY (INHALATION) at 00:10

## 2024-11-07 RX ADMIN — Medication 100 MILLIGRAM(S): at 13:05

## 2024-11-07 RX ADMIN — APIXABAN 5 MILLIGRAM(S): 2.5 TABLET, FILM COATED ORAL at 05:22

## 2024-11-07 RX ADMIN — SERTRALINE 50 MILLIGRAM(S): 100 TABLET, FILM COATED ORAL at 12:00

## 2024-11-07 RX ADMIN — LABETALOL HYDROCHLORIDE 100 MILLIGRAM(S): 200 TABLET, FILM COATED ORAL at 21:23

## 2024-11-07 RX ADMIN — Medication 40 MILLIGRAM(S): at 05:40

## 2024-11-07 RX ADMIN — Medication 1 DOSE(S): at 17:47

## 2024-11-07 RX ADMIN — LEVETIRACETAM 1500 MILLIGRAM(S): 10 INJECTION, SOLUTION INTRAVENOUS at 17:48

## 2024-11-07 RX ADMIN — MEXILETINE HYDROCHLORIDE 200 MILLIGRAM(S): 250 CAPSULE ORAL at 13:05

## 2024-11-07 RX ADMIN — IPRATROPIUM BROMIDE AND ALBUTEROL SULFATE 3 MILLILITER(S): .5; 2.5 SOLUTION RESPIRATORY (INHALATION) at 05:23

## 2024-11-07 RX ADMIN — GLYCOPYRROLATE 1 MILLIGRAM(S): 0.2 INJECTION INTRAMUSCULAR; INTRAVENOUS at 05:22

## 2024-11-07 RX ADMIN — Medication 2 TABLET(S): at 21:24

## 2024-11-07 RX ADMIN — LACOSAMIDE 100 MILLIGRAM(S): 150 TABLET, FILM COATED ORAL at 05:23

## 2024-11-07 RX ADMIN — INSULIN LISPRO 4 UNIT(S): 100 INJECTION, SOLUTION INTRAVENOUS; SUBCUTANEOUS at 17:51

## 2024-11-07 RX ADMIN — APIXABAN 5 MILLIGRAM(S): 2.5 TABLET, FILM COATED ORAL at 17:51

## 2024-11-07 RX ADMIN — Medication 50 MILLIGRAM(S): at 00:22

## 2024-11-07 RX ADMIN — GLYCOPYRROLATE 1 MILLIGRAM(S): 0.2 INJECTION INTRAMUSCULAR; INTRAVENOUS at 21:23

## 2024-11-07 RX ADMIN — ATORVASTATIN CALCIUM 20 MILLIGRAM(S): 80 TABLET, FILM COATED ORAL at 21:23

## 2024-11-07 RX ADMIN — LEVETIRACETAM 1500 MILLIGRAM(S): 10 INJECTION, SOLUTION INTRAVENOUS at 05:23

## 2024-11-07 RX ADMIN — Medication 4 MILLILITER(S): at 17:49

## 2024-11-07 RX ADMIN — GABAPENTIN 100 MILLIGRAM(S): 400 CAPSULE ORAL at 05:23

## 2024-11-07 RX ADMIN — Medication 30 MILLIGRAM(S): at 05:22

## 2024-11-07 RX ADMIN — MEXILETINE HYDROCHLORIDE 200 MILLIGRAM(S): 250 CAPSULE ORAL at 05:22

## 2024-11-07 RX ADMIN — GABAPENTIN 100 MILLIGRAM(S): 400 CAPSULE ORAL at 13:05

## 2024-11-07 RX ADMIN — POLYETHYLENE GLYCOL 3350 17 GRAM(S): 17 POWDER, FOR SOLUTION ORAL at 11:59

## 2024-11-07 RX ADMIN — ERTAPENEM SODIUM 120 MILLIGRAM(S): 1 INJECTION, POWDER, LYOPHILIZED, FOR SOLUTION INTRAMUSCULAR; INTRAVENOUS at 00:09

## 2024-11-07 RX ADMIN — LABETALOL HYDROCHLORIDE 100 MILLIGRAM(S): 200 TABLET, FILM COATED ORAL at 05:23

## 2024-11-07 RX ADMIN — TIOTROPIUM BROMIDE INHALATION SPRAY 2 PUFF(S): 3.12 SPRAY, METERED RESPIRATORY (INHALATION) at 12:01

## 2024-11-07 RX ADMIN — GLYCOPYRROLATE 1 MILLIGRAM(S): 0.2 INJECTION INTRAMUSCULAR; INTRAVENOUS at 13:06

## 2024-11-07 RX ADMIN — Medication 1 TABLET(S): at 12:00

## 2024-11-07 RX ADMIN — INSULIN LISPRO 6: 100 INJECTION, SOLUTION INTRAVENOUS; SUBCUTANEOUS at 13:04

## 2024-11-07 RX ADMIN — Medication 100 MILLIGRAM(S): at 21:23

## 2024-11-07 RX ADMIN — GABAPENTIN 100 MILLIGRAM(S): 400 CAPSULE ORAL at 21:33

## 2024-11-07 RX ADMIN — CLOPIDOGREL BISULFATE 75 MILLIGRAM(S): 75 TABLET, FILM COATED ORAL at 12:00

## 2024-11-07 RX ADMIN — INSULIN GLARGINE-YFGN 30 UNIT(S): 100 INJECTION, SOLUTION SUBCUTANEOUS at 09:11

## 2024-11-07 RX ADMIN — Medication 100 MILLIGRAM(S): at 05:23

## 2024-11-07 RX ADMIN — IPRATROPIUM BROMIDE AND ALBUTEROL SULFATE 3 MILLILITER(S): .5; 2.5 SOLUTION RESPIRATORY (INHALATION) at 17:47

## 2024-11-07 RX ADMIN — INSULIN LISPRO 4: 100 INJECTION, SOLUTION INTRAVENOUS; SUBCUTANEOUS at 17:50

## 2024-11-07 RX ADMIN — LACOSAMIDE 100 MILLIGRAM(S): 150 TABLET, FILM COATED ORAL at 17:48

## 2024-11-08 ENCOUNTER — TRANSCRIPTION ENCOUNTER (OUTPATIENT)
Age: 74
End: 2024-11-08

## 2024-11-08 ENCOUNTER — RX RENEWAL (OUTPATIENT)
Age: 74
End: 2024-11-08

## 2024-11-08 LAB
ANION GAP SERPL CALC-SCNC: 11 MMOL/L — SIGNIFICANT CHANGE UP (ref 5–17)
BUN SERPL-MCNC: 14 MG/DL — SIGNIFICANT CHANGE UP (ref 7–23)
CALCIUM SERPL-MCNC: 8.7 MG/DL — SIGNIFICANT CHANGE UP (ref 8.4–10.5)
CHLORIDE SERPL-SCNC: 101 MMOL/L — SIGNIFICANT CHANGE UP (ref 96–108)
CO2 SERPL-SCNC: 28 MMOL/L — SIGNIFICANT CHANGE UP (ref 22–31)
CREAT SERPL-MCNC: 0.6 MG/DL — SIGNIFICANT CHANGE UP (ref 0.5–1.3)
EGFR: 93 ML/MIN/1.73M2 — SIGNIFICANT CHANGE UP
EGFR: 93 ML/MIN/1.73M2 — SIGNIFICANT CHANGE UP
GLUCOSE BLDC GLUCOMTR-MCNC: 106 MG/DL — HIGH (ref 70–99)
GLUCOSE BLDC GLUCOMTR-MCNC: 177 MG/DL — HIGH (ref 70–99)
GLUCOSE BLDC GLUCOMTR-MCNC: 204 MG/DL — HIGH (ref 70–99)
GLUCOSE BLDC GLUCOMTR-MCNC: 345 MG/DL — HIGH (ref 70–99)
GLUCOSE SERPL-MCNC: 202 MG/DL — HIGH (ref 70–99)
HCT VFR BLD CALC: 34.5 % — SIGNIFICANT CHANGE UP (ref 34.5–45)
HGB BLD-MCNC: 10.4 G/DL — LOW (ref 11.5–15.5)
MCHC RBC-ENTMCNC: 22.2 PG — LOW (ref 27–34)
MCHC RBC-ENTMCNC: 30.1 G/DL — LOW (ref 32–36)
MCV RBC AUTO: 73.7 FL — LOW (ref 80–100)
NRBC # BLD: 0 /100 WBCS — SIGNIFICANT CHANGE UP (ref 0–0)
NRBC BLD-RTO: 0 /100 WBCS — SIGNIFICANT CHANGE UP (ref 0–0)
PLATELET # BLD AUTO: 307 K/UL — SIGNIFICANT CHANGE UP (ref 150–400)
POTASSIUM SERPL-MCNC: 4.7 MMOL/L — SIGNIFICANT CHANGE UP (ref 3.5–5.3)
POTASSIUM SERPL-SCNC: 4.7 MMOL/L — SIGNIFICANT CHANGE UP (ref 3.5–5.3)
RBC # BLD: 4.68 M/UL — SIGNIFICANT CHANGE UP (ref 3.8–5.2)
RBC # FLD: 19.8 % — HIGH (ref 10.3–14.5)
SODIUM SERPL-SCNC: 140 MMOL/L — SIGNIFICANT CHANGE UP (ref 135–145)
WBC # BLD: 10.11 K/UL — SIGNIFICANT CHANGE UP (ref 3.8–10.5)
WBC # FLD AUTO: 10.11 K/UL — SIGNIFICANT CHANGE UP (ref 3.8–10.5)

## 2024-11-08 PROCEDURE — 99232 SBSQ HOSP IP/OBS MODERATE 35: CPT | Mod: GC

## 2024-11-08 PROCEDURE — 99232 SBSQ HOSP IP/OBS MODERATE 35: CPT

## 2024-11-08 PROCEDURE — 99233 SBSQ HOSP IP/OBS HIGH 50: CPT

## 2024-11-08 RX ORDER — DIPHENHYDRAMINE HCL 12.5MG/5ML
50 ELIXIR ORAL ONCE
Refills: 0 | Status: COMPLETED | OUTPATIENT
Start: 2024-11-08 | End: 2024-11-08

## 2024-11-08 RX ADMIN — INSULIN GLARGINE-YFGN 30 UNIT(S): 100 INJECTION, SOLUTION SUBCUTANEOUS at 09:07

## 2024-11-08 RX ADMIN — GLYCOPYRROLATE 1 MILLIGRAM(S): 0.2 INJECTION INTRAMUSCULAR; INTRAVENOUS at 21:36

## 2024-11-08 RX ADMIN — MEXILETINE HYDROCHLORIDE 200 MILLIGRAM(S): 250 CAPSULE ORAL at 21:36

## 2024-11-08 RX ADMIN — TIOTROPIUM BROMIDE INHALATION SPRAY 2 PUFF(S): 3.12 SPRAY, METERED RESPIRATORY (INHALATION) at 13:01

## 2024-11-08 RX ADMIN — LEVETIRACETAM 1500 MILLIGRAM(S): 10 INJECTION, SOLUTION INTRAVENOUS at 05:35

## 2024-11-08 RX ADMIN — Medication 40 MILLIGRAM(S): at 05:34

## 2024-11-08 RX ADMIN — Medication 1 DOSE(S): at 09:08

## 2024-11-08 RX ADMIN — GLYCOPYRROLATE 1 MILLIGRAM(S): 0.2 INJECTION INTRAMUSCULAR; INTRAVENOUS at 13:02

## 2024-11-08 RX ADMIN — INSULIN GLARGINE-YFGN 10 UNIT(S): 100 INJECTION, SOLUTION SUBCUTANEOUS at 21:37

## 2024-11-08 RX ADMIN — SERTRALINE 50 MILLIGRAM(S): 100 TABLET, FILM COATED ORAL at 12:59

## 2024-11-08 RX ADMIN — APIXABAN 5 MILLIGRAM(S): 2.5 TABLET, FILM COATED ORAL at 18:04

## 2024-11-08 RX ADMIN — Medication 500 MILLIGRAM(S): at 12:58

## 2024-11-08 RX ADMIN — Medication 100 MILLIGRAM(S): at 05:35

## 2024-11-08 RX ADMIN — IPRATROPIUM BROMIDE AND ALBUTEROL SULFATE 3 MILLILITER(S): .5; 2.5 SOLUTION RESPIRATORY (INHALATION) at 13:00

## 2024-11-08 RX ADMIN — ERTAPENEM SODIUM 120 MILLIGRAM(S): 1 INJECTION, POWDER, LYOPHILIZED, FOR SOLUTION INTRAMUSCULAR; INTRAVENOUS at 01:21

## 2024-11-08 RX ADMIN — MONTELUKAST SODIUM 10 MILLIGRAM(S): 10 TABLET ORAL at 13:00

## 2024-11-08 RX ADMIN — METHYLPREDNISOLONE ACETATE 30 MILLIGRAM(S): 80 INJECTION, SUSPENSION INTRA-ARTICULAR; INTRALESIONAL; INTRAMUSCULAR; SOFT TISSUE at 05:35

## 2024-11-08 RX ADMIN — GABAPENTIN 100 MILLIGRAM(S): 400 CAPSULE ORAL at 21:36

## 2024-11-08 RX ADMIN — ATORVASTATIN CALCIUM 20 MILLIGRAM(S): 80 TABLET, FILM COATED ORAL at 21:35

## 2024-11-08 RX ADMIN — INSULIN LISPRO 4: 100 INJECTION, SOLUTION INTRAVENOUS; SUBCUTANEOUS at 09:07

## 2024-11-08 RX ADMIN — Medication 30 MILLIGRAM(S): at 05:34

## 2024-11-08 RX ADMIN — LACOSAMIDE 100 MILLIGRAM(S): 150 TABLET, FILM COATED ORAL at 18:02

## 2024-11-08 RX ADMIN — GABAPENTIN 100 MILLIGRAM(S): 400 CAPSULE ORAL at 13:04

## 2024-11-08 RX ADMIN — MEXILETINE HYDROCHLORIDE 200 MILLIGRAM(S): 250 CAPSULE ORAL at 05:35

## 2024-11-08 RX ADMIN — LABETALOL HYDROCHLORIDE 100 MILLIGRAM(S): 200 TABLET, FILM COATED ORAL at 21:37

## 2024-11-08 RX ADMIN — LABETALOL HYDROCHLORIDE 100 MILLIGRAM(S): 200 TABLET, FILM COATED ORAL at 13:01

## 2024-11-08 RX ADMIN — GABAPENTIN 100 MILLIGRAM(S): 400 CAPSULE ORAL at 05:35

## 2024-11-08 RX ADMIN — CLOPIDOGREL BISULFATE 75 MILLIGRAM(S): 75 TABLET, FILM COATED ORAL at 12:59

## 2024-11-08 RX ADMIN — LEVETIRACETAM 1500 MILLIGRAM(S): 10 INJECTION, SOLUTION INTRAVENOUS at 18:02

## 2024-11-08 RX ADMIN — LABETALOL HYDROCHLORIDE 100 MILLIGRAM(S): 200 TABLET, FILM COATED ORAL at 05:35

## 2024-11-08 RX ADMIN — Medication 4 MILLILITER(S): at 09:08

## 2024-11-08 RX ADMIN — INSULIN LISPRO 8: 100 INJECTION, SOLUTION INTRAVENOUS; SUBCUTANEOUS at 13:01

## 2024-11-08 RX ADMIN — MEXILETINE HYDROCHLORIDE 200 MILLIGRAM(S): 250 CAPSULE ORAL at 13:02

## 2024-11-08 RX ADMIN — IPRATROPIUM BROMIDE AND ALBUTEROL SULFATE 3 MILLILITER(S): .5; 2.5 SOLUTION RESPIRATORY (INHALATION) at 18:03

## 2024-11-08 RX ADMIN — Medication 4 MILLILITER(S): at 18:01

## 2024-11-08 RX ADMIN — Medication 2 TABLET(S): at 21:36

## 2024-11-08 RX ADMIN — INSULIN LISPRO 4 UNIT(S): 100 INJECTION, SOLUTION INTRAVENOUS; SUBCUTANEOUS at 13:01

## 2024-11-08 RX ADMIN — Medication 1 DOSE(S): at 18:03

## 2024-11-08 RX ADMIN — LACOSAMIDE 100 MILLIGRAM(S): 150 TABLET, FILM COATED ORAL at 05:35

## 2024-11-08 RX ADMIN — Medication 50 MILLIGRAM(S): at 00:50

## 2024-11-08 RX ADMIN — INSULIN LISPRO 2: 100 INJECTION, SOLUTION INTRAVENOUS; SUBCUTANEOUS at 18:03

## 2024-11-08 RX ADMIN — GLYCOPYRROLATE 1 MILLIGRAM(S): 0.2 INJECTION INTRAMUSCULAR; INTRAVENOUS at 05:34

## 2024-11-08 RX ADMIN — INSULIN LISPRO 4 UNIT(S): 100 INJECTION, SOLUTION INTRAVENOUS; SUBCUTANEOUS at 09:08

## 2024-11-08 RX ADMIN — APIXABAN 5 MILLIGRAM(S): 2.5 TABLET, FILM COATED ORAL at 05:34

## 2024-11-08 RX ADMIN — Medication 100 MILLIGRAM(S): at 12:59

## 2024-11-08 RX ADMIN — Medication 100 MILLIGRAM(S): at 21:36

## 2024-11-08 RX ADMIN — Medication 1 TABLET(S): at 12:59

## 2024-11-08 RX ADMIN — INSULIN LISPRO 4 UNIT(S): 100 INJECTION, SOLUTION INTRAVENOUS; SUBCUTANEOUS at 18:03

## 2024-11-09 ENCOUNTER — TRANSCRIPTION ENCOUNTER (OUTPATIENT)
Age: 74
End: 2024-11-09

## 2024-11-09 VITALS
RESPIRATION RATE: 18 BRPM | DIASTOLIC BLOOD PRESSURE: 71 MMHG | OXYGEN SATURATION: 96 % | TEMPERATURE: 98 F | HEART RATE: 60 BPM | SYSTOLIC BLOOD PRESSURE: 120 MMHG

## 2024-11-09 DIAGNOSIS — J18.9 PNEUMONIA, UNSPECIFIED ORGANISM: ICD-10-CM

## 2024-11-09 LAB — GLUCOSE BLDC GLUCOMTR-MCNC: 162 MG/DL — HIGH (ref 70–99)

## 2024-11-09 PROCEDURE — 99239 HOSP IP/OBS DSCHRG MGMT >30: CPT

## 2024-11-09 PROCEDURE — 99233 SBSQ HOSP IP/OBS HIGH 50: CPT

## 2024-11-09 RX ORDER — SENNA 187 MG
2 TABLET ORAL
Qty: 0 | Refills: 0 | DISCHARGE
Start: 2024-11-09

## 2024-11-09 RX ORDER — POLYETHYLENE GLYCOL 3350 17 G/17G
17 POWDER, FOR SOLUTION ORAL
Qty: 0 | Refills: 0 | DISCHARGE
Start: 2024-11-09

## 2024-11-09 RX ORDER — INSULIN GLARGINE 300 U/ML
18 INJECTION, SOLUTION SUBCUTANEOUS
Refills: 0 | DISCHARGE

## 2024-11-09 RX ORDER — DIPHENHYDRAMINE HCL 12.5MG/5ML
50 ELIXIR ORAL ONCE
Refills: 0 | Status: COMPLETED | OUTPATIENT
Start: 2024-11-09 | End: 2024-11-09

## 2024-11-09 RX ORDER — APIXABAN 2.5 MG/1
1 TABLET, FILM COATED ORAL
Qty: 0 | Refills: 0 | DISCHARGE
Start: 2024-11-09

## 2024-11-09 RX ORDER — DEXTROMETHORPHAN HBR, GUAIFENESIN 200 MG/10ML
10 LIQUID ORAL
Qty: 0 | Refills: 0 | DISCHARGE
Start: 2024-11-09

## 2024-11-09 RX ADMIN — INSULIN LISPRO 4 UNIT(S): 100 INJECTION, SOLUTION INTRAVENOUS; SUBCUTANEOUS at 08:16

## 2024-11-09 RX ADMIN — GLYCOPYRROLATE 1 MILLIGRAM(S): 0.2 INJECTION INTRAMUSCULAR; INTRAVENOUS at 05:25

## 2024-11-09 RX ADMIN — CLOPIDOGREL BISULFATE 75 MILLIGRAM(S): 75 TABLET, FILM COATED ORAL at 08:18

## 2024-11-09 RX ADMIN — ERTAPENEM SODIUM 120 MILLIGRAM(S): 1 INJECTION, POWDER, LYOPHILIZED, FOR SOLUTION INTRAMUSCULAR; INTRAVENOUS at 00:33

## 2024-11-09 RX ADMIN — METHYLPREDNISOLONE ACETATE 30 MILLIGRAM(S): 80 INJECTION, SUSPENSION INTRA-ARTICULAR; INTRALESIONAL; INTRAMUSCULAR; SOFT TISSUE at 05:24

## 2024-11-09 RX ADMIN — Medication 30 MILLIGRAM(S): at 05:26

## 2024-11-09 RX ADMIN — SERTRALINE 50 MILLIGRAM(S): 100 TABLET, FILM COATED ORAL at 08:18

## 2024-11-09 RX ADMIN — Medication 500 MILLIGRAM(S): at 08:19

## 2024-11-09 RX ADMIN — Medication 4 MILLILITER(S): at 05:24

## 2024-11-09 RX ADMIN — IPRATROPIUM BROMIDE AND ALBUTEROL SULFATE 3 MILLILITER(S): .5; 2.5 SOLUTION RESPIRATORY (INHALATION) at 05:24

## 2024-11-09 RX ADMIN — TIOTROPIUM BROMIDE INHALATION SPRAY 2 PUFF(S): 3.12 SPRAY, METERED RESPIRATORY (INHALATION) at 11:15

## 2024-11-09 RX ADMIN — Medication 40 MILLIGRAM(S): at 05:27

## 2024-11-09 RX ADMIN — GABAPENTIN 100 MILLIGRAM(S): 400 CAPSULE ORAL at 05:25

## 2024-11-09 RX ADMIN — Medication 1 DOSE(S): at 05:24

## 2024-11-09 RX ADMIN — INSULIN GLARGINE-YFGN 30 UNIT(S): 100 INJECTION, SOLUTION SUBCUTANEOUS at 08:15

## 2024-11-09 RX ADMIN — Medication 50 MILLIGRAM(S): at 00:33

## 2024-11-09 RX ADMIN — Medication 1 TABLET(S): at 08:18

## 2024-11-09 RX ADMIN — IPRATROPIUM BROMIDE AND ALBUTEROL SULFATE 3 MILLILITER(S): .5; 2.5 SOLUTION RESPIRATORY (INHALATION) at 11:15

## 2024-11-09 RX ADMIN — MEXILETINE HYDROCHLORIDE 200 MILLIGRAM(S): 250 CAPSULE ORAL at 05:25

## 2024-11-09 RX ADMIN — APIXABAN 5 MILLIGRAM(S): 2.5 TABLET, FILM COATED ORAL at 05:25

## 2024-11-09 RX ADMIN — INSULIN LISPRO 2: 100 INJECTION, SOLUTION INTRAVENOUS; SUBCUTANEOUS at 08:15

## 2024-11-09 RX ADMIN — MONTELUKAST SODIUM 10 MILLIGRAM(S): 10 TABLET ORAL at 08:19

## 2024-11-09 RX ADMIN — LACOSAMIDE 100 MILLIGRAM(S): 150 TABLET, FILM COATED ORAL at 05:25

## 2024-11-09 RX ADMIN — IPRATROPIUM BROMIDE AND ALBUTEROL SULFATE 3 MILLILITER(S): .5; 2.5 SOLUTION RESPIRATORY (INHALATION) at 00:33

## 2024-11-09 RX ADMIN — Medication 100 MILLIGRAM(S): at 05:25

## 2024-11-09 RX ADMIN — LEVETIRACETAM 1500 MILLIGRAM(S): 10 INJECTION, SOLUTION INTRAVENOUS at 05:25

## 2024-11-10 LAB
CULTURE RESULTS: ABNORMAL
CULTURE RESULTS: SIGNIFICANT CHANGE UP
CULTURE RESULTS: SIGNIFICANT CHANGE UP
SPECIMEN SOURCE: SIGNIFICANT CHANGE UP

## 2024-11-11 ENCOUNTER — NON-APPOINTMENT (OUTPATIENT)
Age: 74
End: 2024-11-11

## 2024-11-11 ENCOUNTER — APPOINTMENT (OUTPATIENT)
Dept: CARDIOLOGY | Facility: CLINIC | Age: 74
End: 2024-11-11
Payer: MEDICARE

## 2024-11-11 VITALS
BODY MASS INDEX: 24.48 KG/M2 | DIASTOLIC BLOOD PRESSURE: 61 MMHG | HEART RATE: 64 BPM | SYSTOLIC BLOOD PRESSURE: 121 MMHG | WEIGHT: 156 LBS | HEIGHT: 67 IN | OXYGEN SATURATION: 100 %

## 2024-11-11 DIAGNOSIS — I45.10 UNSPECIFIED RIGHT BUNDLE-BRANCH BLOCK: ICD-10-CM

## 2024-11-11 DIAGNOSIS — Z82.49 FAMILY HISTORY OF ISCHEMIC HEART DISEASE AND OTHER DISEASES OF THE CIRCULATORY SYSTEM: ICD-10-CM

## 2024-11-11 DIAGNOSIS — I44.0 ATRIOVENTRICULAR BLOCK, FIRST DEGREE: ICD-10-CM

## 2024-11-11 PROCEDURE — 93000 ELECTROCARDIOGRAM COMPLETE: CPT

## 2024-11-11 PROCEDURE — 99214 OFFICE O/P EST MOD 30 MIN: CPT

## 2024-11-12 ENCOUNTER — TRANSCRIPTION ENCOUNTER (OUTPATIENT)
Age: 74
End: 2024-11-12

## 2024-11-18 NOTE — DISCHARGE NOTE NURSING/CASE MANAGEMENT/SOCIAL WORK - NSTRANSFERBELONGINGSDISPO_GEN_A_NUR
"Patient's FSGs are controlled on current medication regimen.  Last A1c reviewed-   Lab Results   Component Value Date    HGBA1C 5.8 11/02/2024     Most recent fingerstick glucose reviewed- No results for input(s): "POCTGLUCOSE" in the last 24 hours.  Current correctional scale  Low  Maintain anti-hyperglycemic dose as follows-   Antihyperglycemics (From admission, onward)    None        Hold Oral hypoglycemics while patient is in the hospital.  " not applicable/with patient

## 2024-11-19 ENCOUNTER — TRANSCRIPTION ENCOUNTER (OUTPATIENT)
Age: 74
End: 2024-11-19

## 2024-11-19 NOTE — DIETITIAN NUTRITION RISK NOTIFICATION - INADEQUATE ENERGY INTAKE
Diabetic foot exam abnormal today with abnormal monofilament sensation testing noted. Dorsal pedal pulses palpable. No cyanosis noted of feet. No ulcerations of feet noted.     < 75% for > 7 days

## 2024-11-20 LAB — T3 SERPL-MCNC: 86 NG/DL — SIGNIFICANT CHANGE UP

## 2024-11-22 NOTE — ED ADULT NURSE NOTE - NSFALLASSISTNEEDED_ED_ALL_ED
[FreeTextEntry1] : 72 yo WM, here with his wife, for f/u of sacral press. ulcer that was first noticed in 8/24.Pt was hospitalized recently for 2 wks with pneumonia. Receiving PT at home. The ulcer is still covered with yellow slough, but has contracted somewhat. No SOI. I emphasized to the pt/wife of the importance of offload/frequent change of position/rotation while in bed or chair and to increase standing/ambulation if possible and as much as possible throughout the day/night. Standing/Walking/Toileting/Moving from bed to stretcher

## 2024-11-25 ENCOUNTER — TRANSCRIPTION ENCOUNTER (OUTPATIENT)
Age: 74
End: 2024-11-25

## 2024-11-26 ENCOUNTER — OUTPATIENT (OUTPATIENT)
Dept: OUTPATIENT SERVICES | Facility: HOSPITAL | Age: 74
LOS: 1 days | End: 2024-11-26

## 2024-11-26 ENCOUNTER — APPOINTMENT (OUTPATIENT)
Dept: INTERNAL MEDICINE | Facility: CLINIC | Age: 74
End: 2024-11-26
Payer: MEDICARE

## 2024-11-26 VITALS
HEIGHT: 67 IN | SYSTOLIC BLOOD PRESSURE: 100 MMHG | BODY MASS INDEX: 24.48 KG/M2 | HEART RATE: 64 BPM | DIASTOLIC BLOOD PRESSURE: 60 MMHG | OXYGEN SATURATION: 97 % | WEIGHT: 156 LBS

## 2024-11-26 DIAGNOSIS — J18.9 PNEUMONIA, UNSPECIFIED ORGANISM: ICD-10-CM

## 2024-11-26 DIAGNOSIS — R53.81 OTHER MALAISE: ICD-10-CM

## 2024-11-26 DIAGNOSIS — K62.5 HEMORRHAGE OF ANUS AND RECTUM: Chronic | ICD-10-CM

## 2024-11-26 DIAGNOSIS — J47.9 BRONCHIECTASIS, UNCOMPLICATED: ICD-10-CM

## 2024-11-26 DIAGNOSIS — I48.91 UNSPECIFIED ATRIAL FIBRILLATION: ICD-10-CM

## 2024-11-26 DIAGNOSIS — I10 ESSENTIAL (PRIMARY) HYPERTENSION: ICD-10-CM

## 2024-11-26 DIAGNOSIS — Z95.2 PRESENCE OF PROSTHETIC HEART VALVE: Chronic | ICD-10-CM

## 2024-11-26 DIAGNOSIS — I71.012 DISSECTION OF DESCENDING THORACIC AORTA: ICD-10-CM

## 2024-11-26 DIAGNOSIS — Z98.89 OTHER SPECIFIED POSTPROCEDURAL STATES: Chronic | ICD-10-CM

## 2024-11-26 DIAGNOSIS — H05.352 EXOSTOSIS OF LEFT ORBIT: Chronic | ICD-10-CM

## 2024-11-26 DIAGNOSIS — Z96.653 PRESENCE OF ARTIFICIAL KNEE JOINT, BILATERAL: Chronic | ICD-10-CM

## 2024-11-26 DIAGNOSIS — J44.9 CHRONIC OBSTRUCTIVE PULMONARY DISEASE, UNSPECIFIED: ICD-10-CM

## 2024-11-26 DIAGNOSIS — Z98.890 OTHER SPECIFIED POSTPROCEDURAL STATES: Chronic | ICD-10-CM

## 2024-11-26 DIAGNOSIS — E11.40 TYPE 2 DIABETES MELLITUS WITH DIABETIC NEUROPATHY, UNSPECIFIED: ICD-10-CM

## 2024-11-26 DIAGNOSIS — G40.909 EPILEPSY, UNSPECIFIED, NOT INTRACTABLE, W/OUT STATUS EPILEPTICUS: ICD-10-CM

## 2024-11-26 DIAGNOSIS — I71.9 AORTIC ANEURYSM OF UNSPECIFIED SITE, W/OUT RUPTURE: ICD-10-CM

## 2024-11-26 DIAGNOSIS — C21.0 MALIGNANT NEOPLASM OF ANUS, UNSPECIFIED: ICD-10-CM

## 2024-11-26 PROCEDURE — 84295 ASSAY OF SERUM SODIUM: CPT

## 2024-11-26 PROCEDURE — 84443 ASSAY THYROID STIM HORMONE: CPT

## 2024-11-26 PROCEDURE — 87086 URINE CULTURE/COLONY COUNT: CPT

## 2024-11-26 PROCEDURE — 97116 GAIT TRAINING THERAPY: CPT

## 2024-11-26 PROCEDURE — 80053 COMPREHEN METABOLIC PANEL: CPT

## 2024-11-26 PROCEDURE — 36415 COLL VENOUS BLD VENIPUNCTURE: CPT

## 2024-11-26 PROCEDURE — 83605 ASSAY OF LACTIC ACID: CPT

## 2024-11-26 PROCEDURE — 84484 ASSAY OF TROPONIN QUANT: CPT

## 2024-11-26 PROCEDURE — 96374 THER/PROPH/DIAG INJ IV PUSH: CPT

## 2024-11-26 PROCEDURE — 84480 ASSAY TRIIODOTHYRONINE (T3): CPT

## 2024-11-26 PROCEDURE — 84439 ASSAY OF FREE THYROXINE: CPT

## 2024-11-26 PROCEDURE — 82962 GLUCOSE BLOOD TEST: CPT

## 2024-11-26 PROCEDURE — 87449 NOS EACH ORGANISM AG IA: CPT

## 2024-11-26 PROCEDURE — 87206 SMEAR FLUORESCENT/ACID STAI: CPT

## 2024-11-26 PROCEDURE — 87205 SMEAR GRAM STAIN: CPT

## 2024-11-26 PROCEDURE — 71250 CT THORAX DX C-: CPT | Mod: MC

## 2024-11-26 PROCEDURE — 84132 ASSAY OF SERUM POTASSIUM: CPT

## 2024-11-26 PROCEDURE — 87640 STAPH A DNA AMP PROBE: CPT

## 2024-11-26 PROCEDURE — 82330 ASSAY OF CALCIUM: CPT

## 2024-11-26 PROCEDURE — 96375 TX/PRO/DX INJ NEW DRUG ADDON: CPT

## 2024-11-26 PROCEDURE — 99291 CRITICAL CARE FIRST HOUR: CPT

## 2024-11-26 PROCEDURE — 82947 ASSAY GLUCOSE BLOOD QUANT: CPT

## 2024-11-26 PROCEDURE — 87040 BLOOD CULTURE FOR BACTERIA: CPT

## 2024-11-26 PROCEDURE — 82803 BLOOD GASES ANY COMBINATION: CPT

## 2024-11-26 PROCEDURE — 0225U NFCT DS DNA&RNA 21 SARSCOV2: CPT

## 2024-11-26 PROCEDURE — 94640 AIRWAY INHALATION TREATMENT: CPT

## 2024-11-26 PROCEDURE — 87015 SPECIMEN INFECT AGNT CONCNTJ: CPT

## 2024-11-26 PROCEDURE — 92610 EVALUATE SWALLOWING FUNCTION: CPT

## 2024-11-26 PROCEDURE — G0463: CPT

## 2024-11-26 PROCEDURE — 80048 BASIC METABOLIC PNL TOTAL CA: CPT

## 2024-11-26 PROCEDURE — 81001 URINALYSIS AUTO W/SCOPE: CPT

## 2024-11-26 PROCEDURE — 85027 COMPLETE CBC AUTOMATED: CPT

## 2024-11-26 PROCEDURE — 97110 THERAPEUTIC EXERCISES: CPT

## 2024-11-26 PROCEDURE — 85018 HEMOGLOBIN: CPT

## 2024-11-26 PROCEDURE — 85014 HEMATOCRIT: CPT

## 2024-11-26 PROCEDURE — 87641 MR-STAPH DNA AMP PROBE: CPT

## 2024-11-26 PROCEDURE — 99496 TRANSJ CARE MGMT HIGH F2F 7D: CPT

## 2024-11-26 PROCEDURE — 97161 PT EVAL LOW COMPLEX 20 MIN: CPT

## 2024-11-26 PROCEDURE — 85025 COMPLETE CBC W/AUTO DIFF WBC: CPT

## 2024-11-26 PROCEDURE — 87899 AGENT NOS ASSAY W/OPTIC: CPT

## 2024-11-26 PROCEDURE — 87070 CULTURE OTHR SPECIMN AEROBIC: CPT

## 2024-11-26 PROCEDURE — 87637 SARSCOV2&INF A&B&RSV AMP PRB: CPT

## 2024-11-26 PROCEDURE — 82435 ASSAY OF BLOOD CHLORIDE: CPT

## 2024-11-26 PROCEDURE — 71045 X-RAY EXAM CHEST 1 VIEW: CPT

## 2024-11-26 PROCEDURE — 83880 ASSAY OF NATRIURETIC PEPTIDE: CPT

## 2024-11-26 PROCEDURE — 0241U: CPT

## 2024-11-26 PROCEDURE — 87116 MYCOBACTERIA CULTURE: CPT

## 2024-11-27 ENCOUNTER — TRANSCRIPTION ENCOUNTER (OUTPATIENT)
Age: 74
End: 2024-11-27

## 2024-11-29 ENCOUNTER — APPOINTMENT (OUTPATIENT)
Dept: PULMONOLOGY | Facility: CLINIC | Age: 74
End: 2024-11-29
Payer: MEDICARE

## 2024-11-29 ENCOUNTER — RX RENEWAL (OUTPATIENT)
Age: 74
End: 2024-11-29

## 2024-11-29 VITALS
BODY MASS INDEX: 24.48 KG/M2 | HEIGHT: 67 IN | WEIGHT: 156 LBS | OXYGEN SATURATION: 92 % | DIASTOLIC BLOOD PRESSURE: 78 MMHG | RESPIRATION RATE: 19 BRPM | TEMPERATURE: 97.3 F | SYSTOLIC BLOOD PRESSURE: 136 MMHG | HEART RATE: 58 BPM

## 2024-11-29 PROCEDURE — 96372 THER/PROPH/DIAG INJ SC/IM: CPT

## 2024-11-29 RX ORDER — TEZEPELUMAB-EKKO 210 MG/1.9ML
210 INJECTION, SOLUTION SUBCUTANEOUS
Qty: 0 | Refills: 0 | Status: COMPLETED | OUTPATIENT
Start: 2024-11-27

## 2024-12-05 ENCOUNTER — TRANSCRIPTION ENCOUNTER (OUTPATIENT)
Age: 74
End: 2024-12-05

## 2024-12-09 ENCOUNTER — APPOINTMENT (OUTPATIENT)
Dept: PULMONOLOGY | Facility: CLINIC | Age: 74
End: 2024-12-09
Payer: MEDICARE

## 2024-12-09 VITALS
TEMPERATURE: 97.1 F | BODY MASS INDEX: 24.33 KG/M2 | DIASTOLIC BLOOD PRESSURE: 78 MMHG | OXYGEN SATURATION: 91 % | SYSTOLIC BLOOD PRESSURE: 138 MMHG | HEIGHT: 67 IN | HEART RATE: 74 BPM | RESPIRATION RATE: 19 BRPM | WEIGHT: 155 LBS

## 2024-12-09 DIAGNOSIS — E55.9 VITAMIN D DEFICIENCY, UNSPECIFIED: ICD-10-CM

## 2024-12-09 DIAGNOSIS — J47.9 BRONCHIECTASIS, UNCOMPLICATED: ICD-10-CM

## 2024-12-09 DIAGNOSIS — J30.9 ALLERGIC RHINITIS, UNSPECIFIED: ICD-10-CM

## 2024-12-09 PROCEDURE — 95012 NITRIC OXIDE EXP GAS DETER: CPT | Mod: PD

## 2024-12-09 PROCEDURE — 94010 BREATHING CAPACITY TEST: CPT

## 2024-12-09 PROCEDURE — 99214 OFFICE O/P EST MOD 30 MIN: CPT | Mod: 25

## 2024-12-10 ENCOUNTER — TRANSCRIPTION ENCOUNTER (OUTPATIENT)
Age: 74
End: 2024-12-10

## 2024-12-10 ENCOUNTER — APPOINTMENT (OUTPATIENT)
Dept: INTERNAL MEDICINE | Facility: CLINIC | Age: 74
End: 2024-12-10

## 2024-12-10 ENCOUNTER — OUTPATIENT (OUTPATIENT)
Dept: OUTPATIENT SERVICES | Facility: HOSPITAL | Age: 74
LOS: 1 days | End: 2024-12-10

## 2024-12-10 VITALS
WEIGHT: 154 LBS | OXYGEN SATURATION: 95 % | HEART RATE: 71 BPM | DIASTOLIC BLOOD PRESSURE: 68 MMHG | SYSTOLIC BLOOD PRESSURE: 130 MMHG | HEIGHT: 67 IN | BODY MASS INDEX: 24.17 KG/M2

## 2024-12-10 DIAGNOSIS — Z95.2 PRESENCE OF PROSTHETIC HEART VALVE: Chronic | ICD-10-CM

## 2024-12-10 DIAGNOSIS — Z98.890 OTHER SPECIFIED POSTPROCEDURAL STATES: Chronic | ICD-10-CM

## 2024-12-10 DIAGNOSIS — Z96.653 PRESENCE OF ARTIFICIAL KNEE JOINT, BILATERAL: Chronic | ICD-10-CM

## 2024-12-10 DIAGNOSIS — Z87.09 PERSONAL HISTORY OF OTHER DISEASES OF THE RESPIRATORY SYSTEM: Chronic | ICD-10-CM

## 2024-12-10 DIAGNOSIS — Z98.89 OTHER SPECIFIED POSTPROCEDURAL STATES: Chronic | ICD-10-CM

## 2024-12-10 DIAGNOSIS — K62.5 HEMORRHAGE OF ANUS AND RECTUM: Chronic | ICD-10-CM

## 2024-12-10 PROCEDURE — 99214 OFFICE O/P EST MOD 30 MIN: CPT

## 2024-12-10 PROCEDURE — G2211 COMPLEX E/M VISIT ADD ON: CPT

## 2024-12-11 DIAGNOSIS — I10 ESSENTIAL (PRIMARY) HYPERTENSION: ICD-10-CM

## 2024-12-12 ENCOUNTER — INPATIENT (INPATIENT)
Facility: HOSPITAL | Age: 74
LOS: 4 days | Discharge: HOME HEALTH | DRG: 204 | End: 2024-12-17
Attending: STUDENT IN AN ORGANIZED HEALTH CARE EDUCATION/TRAINING PROGRAM | Admitting: HOSPITALIST
Payer: MEDICARE

## 2024-12-12 ENCOUNTER — APPOINTMENT (OUTPATIENT)
Dept: NEUROLOGY | Facility: CLINIC | Age: 74
End: 2024-12-12
Payer: MEDICARE

## 2024-12-12 VITALS — HEART RATE: 53 BPM | SYSTOLIC BLOOD PRESSURE: 140 MMHG | DIASTOLIC BLOOD PRESSURE: 74 MMHG

## 2024-12-12 VITALS
SYSTOLIC BLOOD PRESSURE: 145 MMHG | RESPIRATION RATE: 22 BRPM | WEIGHT: 164.91 LBS | DIASTOLIC BLOOD PRESSURE: 73 MMHG | TEMPERATURE: 98 F | HEART RATE: 52 BPM | HEIGHT: 67 IN | OXYGEN SATURATION: 99 %

## 2024-12-12 DIAGNOSIS — Z98.89 OTHER SPECIFIED POSTPROCEDURAL STATES: Chronic | ICD-10-CM

## 2024-12-12 DIAGNOSIS — Z87.09 PERSONAL HISTORY OF OTHER DISEASES OF THE RESPIRATORY SYSTEM: Chronic | ICD-10-CM

## 2024-12-12 DIAGNOSIS — J06.9 ACUTE UPPER RESPIRATORY INFECTION, UNSPECIFIED: ICD-10-CM

## 2024-12-12 DIAGNOSIS — G40.119 LOCALIZATION-RELATED (FOCAL) (PARTIAL) SYMPTOMATIC EPILEPSY AND EPILEPTIC SYNDROMES WITH SIMPLE PARTIAL SEIZURES, INTRACTABLE, W/OUT STATUS EPILEPTICUS: ICD-10-CM

## 2024-12-12 DIAGNOSIS — Z95.2 PRESENCE OF PROSTHETIC HEART VALVE: Chronic | ICD-10-CM

## 2024-12-12 DIAGNOSIS — Z98.890 OTHER SPECIFIED POSTPROCEDURAL STATES: Chronic | ICD-10-CM

## 2024-12-12 DIAGNOSIS — K62.5 HEMORRHAGE OF ANUS AND RECTUM: Chronic | ICD-10-CM

## 2024-12-12 DIAGNOSIS — H05.352 EXOSTOSIS OF LEFT ORBIT: Chronic | ICD-10-CM

## 2024-12-12 DIAGNOSIS — Z96.653 PRESENCE OF ARTIFICIAL KNEE JOINT, BILATERAL: Chronic | ICD-10-CM

## 2024-12-12 DIAGNOSIS — R06.02 SHORTNESS OF BREATH: ICD-10-CM

## 2024-12-12 LAB
ALBUMIN SERPL ELPH-MCNC: 4.1 G/DL — SIGNIFICANT CHANGE UP (ref 3.3–5)
ALP SERPL-CCNC: 134 U/L — HIGH (ref 40–120)
ALT FLD-CCNC: 47 U/L — HIGH (ref 10–45)
ANION GAP SERPL CALC-SCNC: 12 MMOL/L — SIGNIFICANT CHANGE UP (ref 5–17)
ANISOCYTOSIS BLD QL: SLIGHT — SIGNIFICANT CHANGE UP
APPEARANCE UR: CLEAR — SIGNIFICANT CHANGE UP
APTT BLD: 38.5 SEC — HIGH (ref 24.5–35.6)
AST SERPL-CCNC: 62 U/L — HIGH (ref 10–40)
BACTERIA # UR AUTO: NEGATIVE /HPF — SIGNIFICANT CHANGE UP
BASOPHILS # BLD AUTO: 0 K/UL — SIGNIFICANT CHANGE UP (ref 0–0.2)
BASOPHILS NFR BLD AUTO: 0 % — SIGNIFICANT CHANGE UP (ref 0–2)
BILIRUB SERPL-MCNC: 0.2 MG/DL — SIGNIFICANT CHANGE UP (ref 0.2–1.2)
BILIRUB UR-MCNC: NEGATIVE — SIGNIFICANT CHANGE UP
BUN SERPL-MCNC: 16 MG/DL — SIGNIFICANT CHANGE UP (ref 7–23)
CALCIUM SERPL-MCNC: 9.4 MG/DL — SIGNIFICANT CHANGE UP (ref 8.4–10.5)
CAST: 0 /LPF — SIGNIFICANT CHANGE UP (ref 0–4)
CHLORIDE SERPL-SCNC: 105 MMOL/L — SIGNIFICANT CHANGE UP (ref 96–108)
CO2 SERPL-SCNC: 27 MMOL/L — SIGNIFICANT CHANGE UP (ref 22–31)
COLOR SPEC: YELLOW — SIGNIFICANT CHANGE UP
CREAT SERPL-MCNC: 0.64 MG/DL — SIGNIFICANT CHANGE UP (ref 0.5–1.3)
DIFF PNL FLD: NEGATIVE — SIGNIFICANT CHANGE UP
EGFR: 92 ML/MIN/1.73M2 — SIGNIFICANT CHANGE UP
EOSINOPHIL # BLD AUTO: 0 K/UL — SIGNIFICANT CHANGE UP (ref 0–0.5)
EOSINOPHIL NFR BLD AUTO: 0 % — SIGNIFICANT CHANGE UP (ref 0–6)
FLUAV AG NPH QL: SIGNIFICANT CHANGE UP
FLUBV AG NPH QL: SIGNIFICANT CHANGE UP
GAS PNL BLDV: SIGNIFICANT CHANGE UP
GLUCOSE BLDC GLUCOMTR-MCNC: 217 MG/DL — HIGH (ref 70–99)
GLUCOSE SERPL-MCNC: 48 MG/DL — CRITICAL LOW (ref 70–99)
GLUCOSE UR QL: NEGATIVE MG/DL — SIGNIFICANT CHANGE UP
HCT VFR BLD CALC: 36.2 % — SIGNIFICANT CHANGE UP (ref 34.5–45)
HGB BLD-MCNC: 10.9 G/DL — LOW (ref 11.5–15.5)
INR BLD: 1.27 RATIO — HIGH (ref 0.85–1.16)
KETONES UR-MCNC: NEGATIVE MG/DL — SIGNIFICANT CHANGE UP
LACTATE SERPL-SCNC: 1.8 MMOL/L — SIGNIFICANT CHANGE UP (ref 0.5–2)
LEUKOCYTE ESTERASE UR-ACNC: ABNORMAL
LYMPHOCYTES # BLD AUTO: 19 % — SIGNIFICANT CHANGE UP (ref 13–44)
LYMPHOCYTES # BLD AUTO: 2.27 K/UL — SIGNIFICANT CHANGE UP (ref 1–3.3)
MANUAL SMEAR VERIFICATION: SIGNIFICANT CHANGE UP
MCHC RBC-ENTMCNC: 23.6 PG — LOW (ref 27–34)
MCHC RBC-ENTMCNC: 30.1 G/DL — LOW (ref 32–36)
MCV RBC AUTO: 78.5 FL — LOW (ref 80–100)
METAMYELOCYTES # FLD: 1 % — HIGH (ref 0–0)
MICROCYTES BLD QL: SLIGHT — SIGNIFICANT CHANGE UP
MONOCYTES # BLD AUTO: 0.48 K/UL — SIGNIFICANT CHANGE UP (ref 0–0.9)
MONOCYTES NFR BLD AUTO: 4 % — SIGNIFICANT CHANGE UP (ref 2–14)
NEUTROPHILS # BLD AUTO: 9.07 K/UL — HIGH (ref 1.8–7.4)
NEUTROPHILS NFR BLD AUTO: 76 % — SIGNIFICANT CHANGE UP (ref 43–77)
NITRITE UR-MCNC: NEGATIVE — SIGNIFICANT CHANGE UP
NRBC # BLD: 0 /100 WBCS — SIGNIFICANT CHANGE UP (ref 0–0)
NT-PROBNP SERPL-SCNC: 1397 PG/ML — HIGH (ref 0–300)
OVALOCYTES BLD QL SMEAR: SLIGHT — SIGNIFICANT CHANGE UP
PH UR: 5.5 — SIGNIFICANT CHANGE UP (ref 5–8)
PLAT MORPH BLD: NORMAL — SIGNIFICANT CHANGE UP
PLATELET # BLD AUTO: 372 K/UL — SIGNIFICANT CHANGE UP (ref 150–400)
POIKILOCYTOSIS BLD QL AUTO: SLIGHT — SIGNIFICANT CHANGE UP
POTASSIUM SERPL-MCNC: 4.4 MMOL/L — SIGNIFICANT CHANGE UP (ref 3.5–5.3)
POTASSIUM SERPL-SCNC: 4.4 MMOL/L — SIGNIFICANT CHANGE UP (ref 3.5–5.3)
PROT SERPL-MCNC: 7 G/DL — SIGNIFICANT CHANGE UP (ref 6–8.3)
PROT UR-MCNC: NEGATIVE MG/DL — SIGNIFICANT CHANGE UP
PROTHROM AB SERPL-ACNC: 14.5 SEC — HIGH (ref 9.9–13.4)
RBC # BLD: 4.61 M/UL — SIGNIFICANT CHANGE UP (ref 3.8–5.2)
RBC # FLD: 21 % — HIGH (ref 10.3–14.5)
RBC BLD AUTO: ABNORMAL
RBC CASTS # UR COMP ASSIST: 2 /HPF — SIGNIFICANT CHANGE UP (ref 0–4)
RSV RNA NPH QL NAA+NON-PROBE: SIGNIFICANT CHANGE UP
SARS-COV-2 RNA SPEC QL NAA+PROBE: SIGNIFICANT CHANGE UP
SODIUM SERPL-SCNC: 144 MMOL/L — SIGNIFICANT CHANGE UP (ref 135–145)
SP GR SPEC: 1.02 — SIGNIFICANT CHANGE UP (ref 1–1.03)
SQUAMOUS # UR AUTO: 0 /HPF — SIGNIFICANT CHANGE UP (ref 0–5)
TARGETS BLD QL SMEAR: SLIGHT — SIGNIFICANT CHANGE UP
TROPONIN T, HIGH SENSITIVITY RESULT: 42 NG/L — SIGNIFICANT CHANGE UP (ref 0–51)
UROBILINOGEN FLD QL: 1 MG/DL — SIGNIFICANT CHANGE UP (ref 0.2–1)
WBC # BLD: 11.93 K/UL — HIGH (ref 3.8–10.5)
WBC # FLD AUTO: 11.93 K/UL — HIGH (ref 3.8–10.5)
WBC UR QL: 0 /HPF — SIGNIFICANT CHANGE UP (ref 0–5)

## 2024-12-12 PROCEDURE — 99214 OFFICE O/P EST MOD 30 MIN: CPT

## 2024-12-12 PROCEDURE — 99285 EMERGENCY DEPT VISIT HI MDM: CPT | Mod: GC

## 2024-12-12 PROCEDURE — G2211 COMPLEX E/M VISIT ADD ON: CPT | Mod: PD

## 2024-12-12 PROCEDURE — 76937 US GUIDE VASCULAR ACCESS: CPT | Mod: 26

## 2024-12-12 PROCEDURE — 71045 X-RAY EXAM CHEST 1 VIEW: CPT | Mod: 26

## 2024-12-12 RX ORDER — ERTAPENEM 1 G/1
1000 INJECTION, POWDER, LYOPHILIZED, FOR SOLUTION INTRAMUSCULAR; INTRAVENOUS ONCE
Refills: 0 | Status: COMPLETED | OUTPATIENT
Start: 2024-12-12 | End: 2024-12-12

## 2024-12-12 RX ORDER — AZITHROMYCIN 250 MG/1
500 TABLET, FILM COATED ORAL ONCE
Refills: 0 | Status: COMPLETED | OUTPATIENT
Start: 2024-12-12 | End: 2024-12-12

## 2024-12-12 RX ORDER — IPRATROPIUM BROMIDE AND ALBUTEROL SULFATE 2.5; .5 MG/3ML; MG/3ML
3 SOLUTION RESPIRATORY (INHALATION)
Refills: 0 | Status: COMPLETED | OUTPATIENT
Start: 2024-12-12 | End: 2024-12-12

## 2024-12-12 RX ADMIN — IPRATROPIUM BROMIDE AND ALBUTEROL SULFATE 3 MILLILITER(S): 2.5; .5 SOLUTION RESPIRATORY (INHALATION) at 13:48

## 2024-12-12 RX ADMIN — IPRATROPIUM BROMIDE AND ALBUTEROL SULFATE 3 MILLILITER(S): 2.5; .5 SOLUTION RESPIRATORY (INHALATION) at 14:04

## 2024-12-12 RX ADMIN — AZITHROMYCIN 255 MILLIGRAM(S): 250 TABLET, FILM COATED ORAL at 18:55

## 2024-12-12 RX ADMIN — IPRATROPIUM BROMIDE AND ALBUTEROL SULFATE 3 MILLILITER(S): 2.5; .5 SOLUTION RESPIRATORY (INHALATION) at 13:57

## 2024-12-12 NOTE — ED ADULT NURSE NOTE - NSFALLRISKINTERV_ED_ALL_ED
Assistance OOB with selected safe patient handling equipment if applicable/Assistance with ambulation/Communicate fall risk and risk factors to all staff, patient, and family/Monitor gait and stability/Provide patient with walking aids/Provide visual cue: yellow wristband, yellow gown, etc/Reinforce activity limits and safety measures with patient and family/Call bell, personal items and telephone in reach/Instruct patient to call for assistance before getting out of bed/chair/stretcher/Non-slip footwear applied when patient is off stretcher/[place] to call system/Physically safe environment - no spills, clutter or unnecessary equipment/Purposeful Proactive Rounding/Room/bathroom lighting operational, light cord in reach

## 2024-12-12 NOTE — ED ADULT NURSE REASSESSMENT NOTE - NS ED NURSE REASSESS COMMENT FT1
Pt updated on plan of care. IV dressing changer by RN per pt request. Safety and comfort measures provided.
Pt updated on plan with RN utilizing therapeutic communication: currently waiting for provider to AMA patient. Safety and comfort measures provided.
Report received from RADHA Copeland. Informed of pt's desire to AMA. Provider Matilde Bellamy called and messaged on teams notified of pt's concern: pending response.
dr. fried aware of need for US line after failed peripheral IV attempts by RN x2.
pt resting in bed, cleaned and placed on primafit. aide at bedside. updated on plan of care. reports feeling better, is less tachypneic.
pt and daughter on phone state they want to leave and would like a midline placed and pt to have IV abx given at home. RN informed pt and daughter on phone that pt is currently admitted to the hospital and the inpatient team will be contacted regarding their desire to leave, potentially AMA. pt is A&Ox4.

## 2024-12-12 NOTE — ED PROVIDER NOTE - PHYSICAL EXAMINATION
GENERAL: Awake, alert, NAD  HEENT: NC/AT, moist mucous membranes, PERRL, EOMI  LUNGS: Wheezing heard throughout all lung fields.   CARDIAC: RRR, no m/r/g  ABDOMEN: Soft, non tender, non distended, no rebound, no guarding  BACK: No midline spinal tenderness, no CVA tenderness  EXT: No edema, no calf tenderness, 2+ DP pulses bilaterally, no deformities.  NEURO: A&Ox3. Moving all extremities.  SKIN: Warm and dry. No rash.  PSYCH: Normal affect.

## 2024-12-12 NOTE — ED ADULT NURSE NOTE - OBJECTIVE STATEMENT
73 y/o female presents to ED from neurology office via EMS c/o SOB, cough, general malaise and fatigue x2 weeks. PMH of afib, asthma, seizures. Denies n/v/d, chest pain, syncope. A&Ox4, breathing is tachypneic, wet cough noted, abd soft nontender nondistended, skin warm dry and intact, ambulatory with rollator at baseline, aide at beside. 73 y/o female presents to ED from neurology office via EMS c/o SOB, cough, general malaise and fatigue x2 weeks. PMH of afib, asthma, seizures, colon ca s/p colostomy.. Denies n/v/d, chest pain, syncope. A&Ox4, breathing is tachypneic, wet cough noted, abd soft nontender nondistended, skin warm dry and intact, ambulatory with rollator at baseline, aide at beside.

## 2024-12-12 NOTE — ED PROVIDER NOTE - NSICDXPASTSURGICALHX_GEN_ALL_CORE_FT
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    H/O pelvic surgery 5 years ago - s/p fracture    H/O total knee replacement, bilateral 5 years ago    History of partial hysterectomy 30 years ago - fibroids    History of sinus surgery multiple sinus surgeries    History of tracheomalacia 2015 - attempted tracheal stenting ([facility])- course complicated by obstruction, respiratory failure, multiple CPR attempts -  stent discontinued; 10/20/2016 Tracheobronchoplasty (Prolene Mesh) performed at [facility] by Dr Zapien    Rectal bleeding exam under anesthesia ([facility]) 2/2018    S/P aortic valve replacement     S/P bronchoscopy 6/5/2018 - [facility] (Dr Zapien) no evidence of tracheobronchomalacia in trachea or bronchial tubes    S/P TAVR (transcatheter aortic valve replacement)

## 2024-12-12 NOTE — ED PROVIDER NOTE - PROGRESS NOTE DETAILS
Miesha Love, PGY-3 DO:  Empirically treated with abx for PNA, Patient TBA for new O2 requirement and presumed PNA. Patient is agreeable with plan.
No

## 2024-12-12 NOTE — ED PROVIDER NOTE - ATTENDING CONTRIBUTION TO CARE
Attending Emergency Physician- Chava Eisenberg MD, FACEP   See MDM above.  Will follow up on labs, analgesia, imaging, reassess and disposition to the inpatient team as clinically indicated.  *The above represents an initial assessment/impression. Please refer to my progress notes below for potential changes in patient clinical course*     Emergency Medicine Attending- Dr. Chava Eisenberg MD, FACEP: Patient endorsed to Dr. Bellamy at the time of admission. Based on patient's history and physical exam, as well as the results of today's workup, I feel that patient warrants admission to the hospital for further workup/evaluation and continued management. I discussed the findings of today's workup with the patient and addressed the patient's questions and concerns. The patient was agreeable with admission. Our team spoke with the inpatient receiving team who accepted the patient for admission and subsequently took over the patient's care at the time of admission. The receiving team will follow up on pending labs, analgesia, any clinical imaging results, ancillary findings, reassess, and disposition as clinically indicated. Details of patient and plan conveyed to receiving physician team and conveyed back for understanding. There were no questions at this time about the patient's status, disposition, and plan. Patient's care to be taken over by receiving physician team at this time, all decisions regarding the progression of care will be made at their discretion.

## 2024-12-12 NOTE — ED PROVIDER NOTE - PRO INTERPRETER NEED 2
Reason For Visit  HORACIO HOLLY is a(n) established patient here today for a hospital follow-up [facility].   Patient accompanied by mother Floridalma Holly.      Quality    Asthma CI assessment & Intervention History Asthma Symptoms Evaluated - ACT: 21 patient provided an asthma action plan, therapy prescribed for long-term asthma management   Pediatric Wellness CI height documented, body mass index, 74 percentile, discussion of nutritional quality of diet, patient education given about proper diet, discussion of regular exercise, printed information given for activities, no preventive medicine therapy for influenza and patient accompanied by mother.      History of Present Illness    Here to follow up on Asthma exacerbation  Hospitalized for 24 hours last week, and discharged on course of Prednisone.  cough ans wheezing resolved.  Influenza vaccine administered during hospital stay.    Trigger for Asthma attacks is changes in weather.        Review of Systems    Const: Normal.   Eyes: Normal.   ENT: Normal.   Neck: Normal.   CV: Normal.   Resp: Normal.   GI: Normal.   Neuro: Normal.   Musc: Normal.   Skin: Normal.   Heme/Immun: Normal.   : Normal.   Psych: Normal.   Endo: Normal.      Allergies  No Known Drug Allergies  Animal dander - Cats    Current Meds  AeroChamber MV; Use a medium sized aerochamber/ spacer with the inhaler;  Therapy: 28Spm9913 to (Evaluate:06Etn3008)  Requested for: 02Aug2018; Last  Rx:09Nov2017 Ordered  Albuterol Sulfate (2.5 MG/3ML) 0.083% Inhalation Nebulization Solution; USE 1 PER  NEBULIZER EVERY 4 HOURS AS NEEDED;  Therapy: 42Nro2771 to (Evaluate:61Qwt8760)  Requested for: 02Aug2018; Last  Rx:16Mar2018 Ordered  Dulera 100-5 MCG/ACT Inhalation Aerosol; INHALE 2 PUFFS TWICE DAILY. RINSE  MOUTH AFTER USE;  Therapy: 46Tbr8057 to (Last Rx:85Sef9446)  Requested for: 02Aug2018 Ordered  EPINEPHrine 0.15 MG/0.3ML Injection Solution Auto-injector; INJECT 0.15MG  INTRAMUSCULARLY AS  NEEDED;  Therapy: 07Jan2016 to (Last Rx:50Mgr0765)  Requested for: 02Aug2018 Ordered  Nebulizer/Pediatric Mask KIT; USE AS DIRECTED;  Therapy: 09Aug2017 to (Last Rx:09Aug2017)  Requested for: 02Aug2018 Ordered  ProAir  (90 Base) MCG/ACT Inhalation Aerosol Solution; INHALE2 PUFFS EVERY  4 HOURS WITH AEROCHAMBER/ SPACER AS NEEDED;  Therapy: 05Oct2017 to (Evaluate:23Tta4372)  Requested for: 02Aug2018; Last  Rx:16Mar2018 Ordered    Active Problems  Allergic rhinitis (J30.9)  Allergy to egg white (Z91.012)  Eczema (L30.9)  Moderate persistent asthma (J45.40)  Sickle-cell trait (D57.3)    Family History  Family medical history was reviewed.      Social History  Denied: History of Secondhand smoke exposure  Denied: History of Secondhand Tobacco Smoke In Home  He is in a public school.      Vitals  Vital Signs    Recorded: 15Oct2018 12:48PM   Height 3 ft 11 in   Weight 51 lb 2.40 oz   BMI Calculated 16.28   BSA Calculated 0.88   BMI Percentile 74   2-20 Stature Percentile 82 %   2-20 Weight Percentile 80 %   Systolic 100, LUE, Sitting   Diastolic 63, LUE, Sitting   Temperature 97.2 F, Tympanic   Heart Rate 90   O2 Saturation 98     Physical Exam  Constitutional: well developed, well nourished, in no acute distress, interactive, current vital signs reviewed and at baseline for patient based on underlying medical diagnoses.   Head and Face: atraumatic, no deformities, normocephalic, normal facies.   Eyes: no discharge, normal conjunctiva, no eyelid swelling and no ptosis. the sclerae were normal, pupils equal, round and reactive to light and accommodation, conjugate gaze and extraocular movements were intact.   ENT: normal appearing outer ear and normal appearing nose. examination of the tympanic membrane showed normal landmarks and normal appearing external canal. nasal mucosa moist and pink and no nasal discharge. normal lips. oral mucosa pink and moist, no oral lesions, normal appearing pharynx and normal appearing  tongue.   Chest: normal breast appearance. normal chest appearance.   Pulmonary: no respiratory distress, normal respiratory rate and effort and no accessory muscle use. breath sounds clear to auscultation bilaterally.   Cardiovascular: normal rate, no murmurs were heard, regular rhythm, normal S1 and normal S2.   Abdomen: soft, nontender, nondistended, normal bowel sounds and no abdominal mass. no hepatomegaly and no splenomegaly. no umbilical hernia was discovered.      Immunizations   ** Printed in Appendix #1 below.     Assessment   1. Moderate persistent asthma (J45.40)   2. Allergy to egg white (Z91.012)   3. Allergic rhinitis (J30.9)    Plan    Asthma Action Plan and Assessment Tool  Return visit 6 months.      Signatures   Electronically signed by : MAYLIN Torre; Oct 15 2018 12:49PM CST    Electronically signed by : ALEJANDRA RODRIGUEZ M.D.; Oct 15 2018  1:44PM CST    Appendix #1     Patient: HORACIO WERNER; : 2014; MRN: [id number]      1 2 3 4 5 6    DTP/DTaP  23-Feb-2013 25-Apr-2013 25-Jul-2013 01-Apr-2014 14-Mar-2017     FLU  11-Oct-2018         Hepatitis A  06-Dec-2013 24-Jul-2014        Hepatitis B  2012 08-Carloz-2013 23-Feb-2013 25-Apr-2013 25-Jul-2013     HIB  2013       Influenza  19-Sep-2013 19-Nov-2013 17-Oct-2014 04-Nov-2015 17-Oct-2016 24-Oct-2017    MMR  06-Dec-2013 14-Mar-2017        PCV  23-Feb-2013 25-Apr-2013 25-Jul-2013 06-Dec-2013      Polio  23-Feb-2013 25-Apr-2013 25-Jul-2013 14-Mar-2017      Rotavirus  2013       Varicella  06-Dec-2013 14-Mar-2017        English

## 2024-12-12 NOTE — ED PROVIDER NOTE - TEMPLATE, MLM
Chest Wall Pain  Chest wall pain is pain in or around the bones and muscles of your chest. It may take up to 6 weeks to get better. It may take longer if you must stay physically active in your work and activities.   CAUSES   Chest wall pain may happen on its own. However, it may be caused by:  · A viral illness like the flu.  · Injury.  · Coughing.  · Exercise.  · Arthritis.  · Fibromyalgia.  · Shingles.  HOME CARE INSTRUCTIONS   · Avoid overtiring physical activity. Try not to strain or perform activities that cause pain. This includes any activities using your chest or your abdominal and side muscles, especially if heavy weights are used.  · Put ice on the sore area.  ¨ Put ice in a plastic bag.  ¨ Place a towel between your skin and the bag.  ¨ Leave the ice on for 15-20 minutes per hour while awake for the first 2 days.  · Only take over-the-counter or prescription medicines for pain, discomfort, or fever as directed by your caregiver.  SEEK IMMEDIATE MEDICAL CARE IF:   · Your pain increases, or you are very uncomfortable.  · You have a fever.  · Your chest pain becomes worse.  · You have new, unexplained symptoms.  · You have nausea or vomiting.  · You feel sweaty or lightheaded.  · You have a cough with phlegm (sputum), or you cough up blood.  MAKE SURE YOU:   · Understand these instructions.  · Will watch your condition.  · Will get help right away if you are not doing well or get worse.     This information is not intended to replace advice given to you by your health care provider. Make sure you discuss any questions you have with your health care provider.     Document Released: 12/18/2006 Document Revised: 03/11/2013 Document Reviewed: 03/14/2016  [employer] Patient Education ©2016 [employer]    
VIEW ALL

## 2024-12-12 NOTE — ED PROVIDER NOTE - CLINICAL SUMMARY MEDICAL DECISION MAKING FREE TEXT BOX
This is a 74-year-old female, history of type aortic dissection status postrepair, bronchiectasis, pneumonia, tracheomalacia, DM, A-fib on Eliquis, colorectal cancer status post colostomy, seizures, HTN, HLD, presenting for shortness of breath and worsening cough for the past 2 weeks.  The patient states that she went to go see her pulmonologist who told her she needs to come to the ED.  The patient states that she has been having green sputum production.  The patient has not taken anything for her symptoms as of yet.  The patient denies any chest pain.  The patient states that she is ambulating but is having difficulty ambulating secondary to her shortness of breath.  The patient denies any fevers, chills, chest pain, nausea, vomiting, headaches or visual changes.  VSS.  PE.  Wheezing heard throughout all lung fields.  Differential includes but is not limited to pneumonia, CHF, ACS, pleural effusion, pleurisy, will assess for electrolyte/hematological derangement.  Will obtain CBC, CMP, blood cultures, BMP, troponin, EKG, chest x-ray.  Patient likely to be admitted pending labs imaging and reassessment.

## 2024-12-12 NOTE — ED PROVIDER NOTE - CARE PLAN
Principal Discharge DX:	Shortness of breath  Secondary Diagnosis:	Hypoxia  Secondary Diagnosis:	Pneumonia   1

## 2024-12-13 ENCOUNTER — TRANSCRIPTION ENCOUNTER (OUTPATIENT)
Age: 74
End: 2024-12-13

## 2024-12-13 DIAGNOSIS — I48.91 UNSPECIFIED ATRIAL FIBRILLATION: ICD-10-CM

## 2024-12-13 DIAGNOSIS — Z86.79 PERSONAL HISTORY OF OTHER DISEASES OF THE CIRCULATORY SYSTEM: ICD-10-CM

## 2024-12-13 DIAGNOSIS — E11.9 TYPE 2 DIABETES MELLITUS WITHOUT COMPLICATIONS: ICD-10-CM

## 2024-12-13 DIAGNOSIS — Z29.9 ENCOUNTER FOR PROPHYLACTIC MEASURES, UNSPECIFIED: ICD-10-CM

## 2024-12-13 DIAGNOSIS — J45.909 UNSPECIFIED ASTHMA, UNCOMPLICATED: ICD-10-CM

## 2024-12-13 DIAGNOSIS — G40.909 EPILEPSY, UNSPECIFIED, NOT INTRACTABLE, WITHOUT STATUS EPILEPTICUS: ICD-10-CM

## 2024-12-13 PROBLEM — J06.9 VIRAL UPPER RESPIRATORY ILLNESS: Status: ACTIVE | Noted: 2024-12-10 | Resolved: 2025-01-09

## 2024-12-13 LAB
ALBUMIN SERPL ELPH-MCNC: 3.7 G/DL — SIGNIFICANT CHANGE UP (ref 3.3–5)
ALP SERPL-CCNC: 119 U/L — SIGNIFICANT CHANGE UP (ref 40–120)
ALT FLD-CCNC: 36 U/L — SIGNIFICANT CHANGE UP (ref 10–45)
ANION GAP SERPL CALC-SCNC: 15 MMOL/L — SIGNIFICANT CHANGE UP (ref 5–17)
AST SERPL-CCNC: 30 U/L — SIGNIFICANT CHANGE UP (ref 10–40)
BASOPHILS # BLD AUTO: 0.03 K/UL — SIGNIFICANT CHANGE UP (ref 0–0.2)
BASOPHILS NFR BLD AUTO: 0.4 % — SIGNIFICANT CHANGE UP (ref 0–2)
BILIRUB SERPL-MCNC: 0.2 MG/DL — SIGNIFICANT CHANGE UP (ref 0.2–1.2)
BUN SERPL-MCNC: 10 MG/DL — SIGNIFICANT CHANGE UP (ref 7–23)
CALCIUM SERPL-MCNC: 9 MG/DL — SIGNIFICANT CHANGE UP (ref 8.4–10.5)
CHLORIDE SERPL-SCNC: 103 MMOL/L — SIGNIFICANT CHANGE UP (ref 96–108)
CO2 SERPL-SCNC: 22 MMOL/L — SIGNIFICANT CHANGE UP (ref 22–31)
CREAT SERPL-MCNC: 0.56 MG/DL — SIGNIFICANT CHANGE UP (ref 0.5–1.3)
EGFR: 95 ML/MIN/1.73M2 — SIGNIFICANT CHANGE UP
EOSINOPHIL # BLD AUTO: 0 K/UL — SIGNIFICANT CHANGE UP (ref 0–0.5)
EOSINOPHIL NFR BLD AUTO: 0 % — SIGNIFICANT CHANGE UP (ref 0–6)
GLUCOSE BLDC GLUCOMTR-MCNC: 107 MG/DL — HIGH (ref 70–99)
GLUCOSE BLDC GLUCOMTR-MCNC: 110 MG/DL — HIGH (ref 70–99)
GLUCOSE BLDC GLUCOMTR-MCNC: 144 MG/DL — HIGH (ref 70–99)
GLUCOSE BLDC GLUCOMTR-MCNC: 336 MG/DL — HIGH (ref 70–99)
GLUCOSE BLDC GLUCOMTR-MCNC: 407 MG/DL — HIGH (ref 70–99)
GLUCOSE SERPL-MCNC: 219 MG/DL — HIGH (ref 70–99)
HCT VFR BLD CALC: 39.9 % — SIGNIFICANT CHANGE UP (ref 34.5–45)
HGB BLD-MCNC: 11.5 G/DL — SIGNIFICANT CHANGE UP (ref 11.5–15.5)
IMM GRANULOCYTES NFR BLD AUTO: 0.6 % — SIGNIFICANT CHANGE UP (ref 0–0.9)
LEGIONELLA AG UR QL: NEGATIVE — SIGNIFICANT CHANGE UP
LYMPHOCYTES # BLD AUTO: 0.95 K/UL — LOW (ref 1–3.3)
LYMPHOCYTES # BLD AUTO: 11.8 % — LOW (ref 13–44)
MAGNESIUM SERPL-MCNC: 2.3 MG/DL — SIGNIFICANT CHANGE UP (ref 1.6–2.6)
MCHC RBC-ENTMCNC: 22.8 PG — LOW (ref 27–34)
MCHC RBC-ENTMCNC: 28.8 G/DL — LOW (ref 32–36)
MCV RBC AUTO: 79 FL — LOW (ref 80–100)
MONOCYTES # BLD AUTO: 0.2 K/UL — SIGNIFICANT CHANGE UP (ref 0–0.9)
MONOCYTES NFR BLD AUTO: 2.5 % — SIGNIFICANT CHANGE UP (ref 2–14)
MRSA PCR RESULT.: SIGNIFICANT CHANGE UP
NEUTROPHILS # BLD AUTO: 6.83 K/UL — SIGNIFICANT CHANGE UP (ref 1.8–7.4)
NEUTROPHILS NFR BLD AUTO: 84.7 % — HIGH (ref 43–77)
NRBC # BLD: 0 /100 WBCS — SIGNIFICANT CHANGE UP (ref 0–0)
PHOSPHATE SERPL-MCNC: 2.9 MG/DL — SIGNIFICANT CHANGE UP (ref 2.5–4.5)
PLATELET # BLD AUTO: 380 K/UL — SIGNIFICANT CHANGE UP (ref 150–400)
POTASSIUM SERPL-MCNC: 4.9 MMOL/L — SIGNIFICANT CHANGE UP (ref 3.5–5.3)
POTASSIUM SERPL-SCNC: 4.9 MMOL/L — SIGNIFICANT CHANGE UP (ref 3.5–5.3)
PROT SERPL-MCNC: 6.8 G/DL — SIGNIFICANT CHANGE UP (ref 6–8.3)
RAPID RVP RESULT: SIGNIFICANT CHANGE UP
RBC # BLD: 5.05 M/UL — SIGNIFICANT CHANGE UP (ref 3.8–5.2)
RBC # FLD: 21.5 % — HIGH (ref 10.3–14.5)
S AUREUS DNA NOSE QL NAA+PROBE: SIGNIFICANT CHANGE UP
SARS-COV-2 RNA SPEC QL NAA+PROBE: SIGNIFICANT CHANGE UP
SODIUM SERPL-SCNC: 140 MMOL/L — SIGNIFICANT CHANGE UP (ref 135–145)
WBC # BLD: 8.06 K/UL — SIGNIFICANT CHANGE UP (ref 3.8–10.5)
WBC # FLD AUTO: 8.06 K/UL — SIGNIFICANT CHANGE UP (ref 3.8–10.5)

## 2024-12-13 PROCEDURE — 99223 1ST HOSP IP/OBS HIGH 75: CPT | Mod: GC

## 2024-12-13 RX ORDER — METHYLPREDNISOLONE SOD SUCC 125 MG
30 VIAL (EA) INJECTION DAILY
Refills: 0 | Status: DISCONTINUED | OUTPATIENT
Start: 2024-12-13 | End: 2024-12-13

## 2024-12-13 RX ORDER — SODIUM CHLORIDE 9 MG/ML
4 INJECTION, SOLUTION INTRAMUSCULAR; INTRAVENOUS; SUBCUTANEOUS EVERY 12 HOURS
Refills: 0 | Status: DISCONTINUED | OUTPATIENT
Start: 2024-12-13 | End: 2024-12-17

## 2024-12-13 RX ORDER — LACOSAMIDE 10 MG/ML
100 INJECTION INTRAVENOUS
Refills: 0 | Status: DISCONTINUED | OUTPATIENT
Start: 2024-12-13 | End: 2024-12-17

## 2024-12-13 RX ORDER — DIPHENHYDRAMINE HCL 25 MG
25 CAPSULE ORAL ONCE
Refills: 0 | Status: COMPLETED | OUTPATIENT
Start: 2024-12-13 | End: 2024-12-13

## 2024-12-13 RX ORDER — DIPHENHYDRAMINE HCL 25 MG
25 CAPSULE ORAL DAILY
Refills: 0 | Status: DISCONTINUED | OUTPATIENT
Start: 2024-12-13 | End: 2024-12-17

## 2024-12-13 RX ORDER — MONTELUKAST SODIUM 10 MG/1
10 TABLET ORAL DAILY
Refills: 0 | Status: DISCONTINUED | OUTPATIENT
Start: 2024-12-13 | End: 2024-12-17

## 2024-12-13 RX ORDER — PANTOPRAZOLE SODIUM 40 MG/1
40 TABLET, DELAYED RELEASE ORAL
Refills: 0 | Status: DISCONTINUED | OUTPATIENT
Start: 2024-12-13 | End: 2024-12-17

## 2024-12-13 RX ORDER — INSULIN GLARGINE 100 [IU]/ML
14 INJECTION, SOLUTION SUBCUTANEOUS AT BEDTIME
Refills: 0 | Status: DISCONTINUED | OUTPATIENT
Start: 2024-12-13 | End: 2024-12-17

## 2024-12-13 RX ORDER — SENNOSIDES 8.6 MG
2 TABLET ORAL AT BEDTIME
Refills: 0 | Status: DISCONTINUED | OUTPATIENT
Start: 2024-12-13 | End: 2024-12-17

## 2024-12-13 RX ORDER — ALBUTEROL 90 MCG
2 AEROSOL (GRAM) INHALATION EVERY 6 HOURS
Refills: 0 | Status: DISCONTINUED | OUTPATIENT
Start: 2024-12-13 | End: 2024-12-17

## 2024-12-13 RX ORDER — NIFEDIPINE 10 MG
30 CAPSULE ORAL DAILY
Refills: 0 | Status: DISCONTINUED | OUTPATIENT
Start: 2024-12-13 | End: 2024-12-17

## 2024-12-13 RX ORDER — GLUCAGON INJECTION, SOLUTION 0.5 MG/.1ML
1 INJECTION, SOLUTION SUBCUTANEOUS ONCE
Refills: 0 | Status: DISCONTINUED | OUTPATIENT
Start: 2024-12-13 | End: 2024-12-17

## 2024-12-13 RX ORDER — METHYLPREDNISOLONE SOD SUCC 125 MG
8 VIAL (EA) INJECTION
Refills: 0 | Status: DISCONTINUED | OUTPATIENT
Start: 2024-12-13 | End: 2024-12-13

## 2024-12-13 RX ORDER — INSULIN GLARGINE 100 [IU]/ML
24 INJECTION, SOLUTION SUBCUTANEOUS AT BEDTIME
Refills: 0 | Status: DISCONTINUED | OUTPATIENT
Start: 2024-12-13 | End: 2024-12-13

## 2024-12-13 RX ORDER — 0.9 % SODIUM CHLORIDE 0.9 %
1000 INTRAVENOUS SOLUTION INTRAVENOUS
Refills: 0 | Status: DISCONTINUED | OUTPATIENT
Start: 2024-12-13 | End: 2024-12-17

## 2024-12-13 RX ORDER — POLYETHYLENE GLYCOL 3350 17 G/17G
17 POWDER, FOR SOLUTION ORAL DAILY
Refills: 0 | Status: DISCONTINUED | OUTPATIENT
Start: 2024-12-13 | End: 2024-12-13

## 2024-12-13 RX ORDER — ROSUVASTATIN CALCIUM 5 MG/1
5 TABLET, FILM COATED ORAL AT BEDTIME
Refills: 0 | Status: DISCONTINUED | OUTPATIENT
Start: 2024-12-13 | End: 2024-12-17

## 2024-12-13 RX ORDER — FLUTICASONE PROPIONATE AND SALMETEROL XINAFOATE 45; 21 UG/1; UG/1
1 AEROSOL, METERED RESPIRATORY (INHALATION)
Refills: 0 | Status: DISCONTINUED | OUTPATIENT
Start: 2024-12-13 | End: 2024-12-17

## 2024-12-13 RX ORDER — LEVETIRACETAM 1000 MG/1
1500 TABLET ORAL
Refills: 0 | Status: DISCONTINUED | OUTPATIENT
Start: 2024-12-13 | End: 2024-12-17

## 2024-12-13 RX ORDER — ERTAPENEM 1 G/1
1000 INJECTION, POWDER, LYOPHILIZED, FOR SOLUTION INTRAMUSCULAR; INTRAVENOUS EVERY 24 HOURS
Refills: 0 | Status: DISCONTINUED | OUTPATIENT
Start: 2024-12-13 | End: 2024-12-17

## 2024-12-13 RX ORDER — APIXABAN 2.5 MG/1
5 TABLET, FILM COATED ORAL EVERY 12 HOURS
Refills: 0 | Status: DISCONTINUED | OUTPATIENT
Start: 2024-12-13 | End: 2024-12-17

## 2024-12-13 RX ORDER — MEXILETINE HCL 150 MG
200 CAPSULE ORAL
Refills: 0 | Status: DISCONTINUED | OUTPATIENT
Start: 2024-12-13 | End: 2024-12-17

## 2024-12-13 RX ORDER — LABETALOL 100 MG/1
100 TABLET, FILM COATED ORAL EVERY 8 HOURS
Refills: 0 | Status: DISCONTINUED | OUTPATIENT
Start: 2024-12-13 | End: 2024-12-17

## 2024-12-13 RX ORDER — INSULIN GLARGINE 100 [IU]/ML
24 INJECTION, SOLUTION SUBCUTANEOUS
Refills: 0 | Status: DISCONTINUED | OUTPATIENT
Start: 2024-12-13 | End: 2024-12-17

## 2024-12-13 RX ORDER — AZITHROMYCIN 250 MG/1
500 TABLET, FILM COATED ORAL EVERY 24 HOURS
Refills: 0 | Status: COMPLETED | OUTPATIENT
Start: 2024-12-13 | End: 2024-12-14

## 2024-12-13 RX ORDER — MAGNESIUM, ALUMINUM HYDROXIDE 200-225/5
30 SUSPENSION, ORAL (FINAL DOSE FORM) ORAL EVERY 6 HOURS
Refills: 0 | Status: DISCONTINUED | OUTPATIENT
Start: 2024-12-13 | End: 2024-12-17

## 2024-12-13 RX ORDER — CYANOCOBALAMIN/FOLIC AC/VIT B6 1-2.2-25MG
1 TABLET ORAL DAILY
Refills: 0 | Status: DISCONTINUED | OUTPATIENT
Start: 2024-12-13 | End: 2024-12-17

## 2024-12-13 RX ORDER — CLOPIDOGREL 75 MG/1
75 TABLET, FILM COATED ORAL DAILY
Refills: 0 | Status: DISCONTINUED | OUTPATIENT
Start: 2024-12-13 | End: 2024-12-17

## 2024-12-13 RX ORDER — SERTRALINE HYDROCHLORIDE 100 MG/1
50 TABLET, FILM COATED ORAL DAILY
Refills: 0 | Status: DISCONTINUED | OUTPATIENT
Start: 2024-12-13 | End: 2024-12-17

## 2024-12-13 RX ORDER — METHYLPREDNISOLONE SOD SUCC 125 MG
8 VIAL (EA) INJECTION
Refills: 0 | Status: DISCONTINUED | OUTPATIENT
Start: 2024-12-14 | End: 2024-12-17

## 2024-12-13 RX ADMIN — Medication 1 TABLET(S): at 11:09

## 2024-12-13 RX ADMIN — Medication 200 MILLIGRAM(S): at 05:16

## 2024-12-13 RX ADMIN — SODIUM CHLORIDE 4 MILLILITER(S): 9 INJECTION, SOLUTION INTRAMUSCULAR; INTRAVENOUS; SUBCUTANEOUS at 18:54

## 2024-12-13 RX ADMIN — ROSUVASTATIN CALCIUM 5 MILLIGRAM(S): 5 TABLET, FILM COATED ORAL at 22:20

## 2024-12-13 RX ADMIN — LEVETIRACETAM 1500 MILLIGRAM(S): 1000 TABLET ORAL at 18:01

## 2024-12-13 RX ADMIN — FLUTICASONE PROPIONATE AND SALMETEROL XINAFOATE 1 DOSE(S): 45; 21 AEROSOL, METERED RESPIRATORY (INHALATION) at 05:15

## 2024-12-13 RX ADMIN — LACOSAMIDE 100 MILLIGRAM(S): 10 INJECTION INTRAVENOUS at 18:01

## 2024-12-13 RX ADMIN — Medication 4: at 13:37

## 2024-12-13 RX ADMIN — AZITHROMYCIN 255 MILLIGRAM(S): 250 TABLET, FILM COATED ORAL at 11:27

## 2024-12-13 RX ADMIN — Medication 30 MILLIGRAM(S): at 05:16

## 2024-12-13 RX ADMIN — LABETALOL 100 MILLIGRAM(S): 100 TABLET, FILM COATED ORAL at 05:16

## 2024-12-13 RX ADMIN — SERTRALINE HYDROCHLORIDE 50 MILLIGRAM(S): 100 TABLET, FILM COATED ORAL at 11:10

## 2024-12-13 RX ADMIN — INSULIN GLARGINE 24 UNIT(S): 100 INJECTION, SOLUTION SUBCUTANEOUS at 10:59

## 2024-12-13 RX ADMIN — LACOSAMIDE 100 MILLIGRAM(S): 10 INJECTION INTRAVENOUS at 05:16

## 2024-12-13 RX ADMIN — Medication 2 TABLET(S): at 22:20

## 2024-12-13 RX ADMIN — SODIUM CHLORIDE 4 MILLILITER(S): 9 INJECTION, SOLUTION INTRAMUSCULAR; INTRAVENOUS; SUBCUTANEOUS at 05:17

## 2024-12-13 RX ADMIN — LABETALOL 100 MILLIGRAM(S): 100 TABLET, FILM COATED ORAL at 22:20

## 2024-12-13 RX ADMIN — LABETALOL 100 MILLIGRAM(S): 100 TABLET, FILM COATED ORAL at 13:43

## 2024-12-13 RX ADMIN — Medication 2 PUFF(S): at 11:12

## 2024-12-13 RX ADMIN — APIXABAN 5 MILLIGRAM(S): 2.5 TABLET, FILM COATED ORAL at 05:16

## 2024-12-13 RX ADMIN — Medication 200 MILLIGRAM(S): at 18:01

## 2024-12-13 RX ADMIN — ERTAPENEM 120 MILLIGRAM(S): 1 INJECTION, POWDER, LYOPHILIZED, FOR SOLUTION INTRAMUSCULAR; INTRAVENOUS at 02:34

## 2024-12-13 RX ADMIN — INSULIN GLARGINE 14 UNIT(S): 100 INJECTION, SOLUTION SUBCUTANEOUS at 22:30

## 2024-12-13 RX ADMIN — APIXABAN 5 MILLIGRAM(S): 2.5 TABLET, FILM COATED ORAL at 18:00

## 2024-12-13 RX ADMIN — POLYETHYLENE GLYCOL 3350 17 GRAM(S): 17 POWDER, FOR SOLUTION ORAL at 11:27

## 2024-12-13 RX ADMIN — FLUTICASONE PROPIONATE AND SALMETEROL XINAFOATE 1 DOSE(S): 45; 21 AEROSOL, METERED RESPIRATORY (INHALATION) at 18:32

## 2024-12-13 RX ADMIN — LEVETIRACETAM 1500 MILLIGRAM(S): 1000 TABLET ORAL at 05:16

## 2024-12-13 RX ADMIN — Medication 30 MILLILITER(S): at 18:01

## 2024-12-13 RX ADMIN — MONTELUKAST SODIUM 10 MILLIGRAM(S): 10 TABLET ORAL at 11:09

## 2024-12-13 RX ADMIN — CLOPIDOGREL 75 MILLIGRAM(S): 75 TABLET, FILM COATED ORAL at 11:09

## 2024-12-13 RX ADMIN — Medication 25 MILLIGRAM(S): at 02:33

## 2024-12-13 RX ADMIN — PANTOPRAZOLE SODIUM 40 MILLIGRAM(S): 40 TABLET, DELAYED RELEASE ORAL at 05:31

## 2024-12-13 NOTE — PROGRESS NOTE ADULT - PROBLEM SELECTOR PLAN 7
DVT PPx: eliquis  Diet: DASH  BM regimen: Senna, Miralax  Dispo: Pending course, Pt may AMA   PCP: Arvind Crockett  Pharmacy: [address] DVT PPx: eliquis  Diet: DASH  BM regimen: Senna  Dispo: Pending course, Pt may AMA   PCP: Arvind Crockett  [facility] [address] [place] [address]

## 2024-12-13 NOTE — H&P ADULT - PROBLEM SELECTOR PLAN 7
DVT PPx: eliquis  Diet: DASH  BM regimen:  Dispo: Pending course, Pt may AMA   PCP:  Pharmacy:  Communication:

## 2024-12-13 NOTE — H&P ADULT - PROBLEM SELECTOR PLAN 2
Ohtuvayre inhalation suspension, breo ellipta, methylpred, albuterol, duonebs, montelukast at home  - C/w montelukast   - C/w advair, spiriva  - incentive spirometer and acapella for airway clearance.

## 2024-12-13 NOTE — PROGRESS NOTE ADULT - PROBLEM SELECTOR PLAN 1
Presenting with cough, green sputum production, SOB, subjective fevers  CXR showing bibasilar opacities with mild pleural effusion  Hx of recurrent PNA, most recent admission 11/2024  Allergies to penicillin, cefepime, meropenem  Previously tolerated ertapenem with benadryl prior  RVP negative  - Start ertapenem, benadryl 20-30 min prior  - Azithromycin for atypical coverage  - MRSA PCR  - Urine legionella  - Chest PT/chest vest, airway clearance  - Wean NC as tolerated  - F/u blood culture  - pending sputum culture    Of note, pt initially wanted to AMA from ED however was convinced by ED to stay Presenting with cough, green sputum production, SOB, subjective fevers  CXR showing bibasilar opacities with mild pleural effusion  Hx of recurrent PNA, most recent admission 11/5-11-9/2024   Allergies to penicillin, cefepime, meropenem  Previously tolerated ertapenem with benadryl prior   - RVP negative, UA neg    Plan:  - On Ertapenem 1g daily empirically with premedication with Benadryl (12/12 - )  - On Azithromycin for atypical coverage (12/12- )  - Pulm consulted given recurrent PNA and extensive pulm hx.   - MRSA PCR; Urine legionella  - Chest PT/chest vest, airway clearance  - Wean NC as tolerated  - F/u blood culture  - pending sputum culture

## 2024-12-13 NOTE — CONSULT NOTE ADULT - SUBJECTIVE AND OBJECTIVE BOX
# PULMONARY CONSULTATION NOTE  [facility] DIVISION OF PULMONARY, CRITICAL CARE, AND SLEEP MEDICINE  Office: [phone]    **Patient Name**: RAYRAY RODRIGUEZ  MRN-[id number]    CHIEF COMPLAINT: Patient is a 74y old  Female who presents with a chief complaint of     HISTORY OF PRESENT ILLNESS:   HPI:  74F hx type a aortic dissection s/p repair, aortic stenosis s/p TAVR in 2023, asthma on chronic steroids, bronchiectasis, pneumonia, tracheomalacia s/p tracheoplasty, T2DM, A-fib on Eliquis, colorectal cancer status post colostomy, seizures, HTN, HLD, presenting for shortness of breath and worsening cough for the past 2 weeks. The patient states that she went to go see her pulmonologist Dr Allison who told her she needs to come to the ED. She has been experiencing a severe cough ongoing for some time. She has been coughing up green sputum. She had a similar hospitalization for PNA in 11/2024 and at the time was discharged on IV ertapenem. She also notes she is having difficulty ambulating in a straight line which she saw her neurologist prior to arrival and thinks is due to her gabapentin (she decreased the dose). She denies chest pain, fevers, chills, abdominal pain, nausea, vomiting, diarrhea, headaches or visual changes.  (13 Dec 2024 00:54)      # Histories:  ## Family:  FAMILY HISTORY:  Family history of asthma    Family history of breast cancer (Sibling)    Family history of diabetes mellitus type II        ## PMH/PSH:  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      H/O pelvic surgery  5 years ago - s/p fracture      History of tracheomalacia  2015 - attempted tracheal stenting ([facility])- course complicated by obstruction, respiratory failure, multiple CPR attempts -  stent discontinued; 10/20/2016 Tracheobronchoplasty (Prolene Mesh) performed at [facility] by Dr Zapien      S/P bronchoscopy  6/5/2018 - [place] (Dr Zapien) no evidence of tracheobronchomalacia in trachea or bronchial tubes      Rectal bleeding  exam under anesthesia ([facility]) 2/2018      S/P TAVR (transcatheter aortic valve replacement)      S/P aortic valve replacement          Allergies    cefepime (Anaphylaxis)  codeine (Short breath)  shellfish (Anaphylaxis)  iodine (Short breath; Swelling)  Dilaudid (Short breath)  penicillin (Anaphylaxis)  Valium (Short breath)  tetanus toxoid (Short breath)  Avelox (Short breath; Pruritus)  Percocet (Unknown)  meropenem (Unknown)  aspirin (Short breath)  OxyContin (Unknown)  ampicillin (Unknown)    Intolerances         ## Outpatient Pulmonologist: Keegan    # ROS  CONSTITUTIONAL: +fever (at home) -chills  CARDIOVASCULAR: -chest pain, palpitations  PULMONARY: +productive cough (greenish)  GASTROINTESTINAL: -N/V/D  [ x ] ALL OTHER REVIEW OF SYSTEMS ARE NEGATIVE     ## O/E:  ICU Vital Signs Last 24 Hrs  T(C): 36.8 (13 Dec 2024 12:49), Max: 36.8 (12 Dec 2024 22:57)  T(F): 98.3 (13 Dec 2024 12:49), Max: 98.3 (13 Dec 2024 12:49)  HR: 69 (13 Dec 2024 12:49) (60 - 77)  BP: 152/75 (13 Dec 2024 12:49) (117/73 - 160/78)  BP(mean): 74 (12 Dec 2024 22:57) (74 - 74)  ABP: --  ABP(mean): --  RR: 18 (13 Dec 2024 12:49) (18 - 22)  SpO2: 100% (13 Dec 2024 12:49) (96% - 100%)    O2 Parameters below as of 13 Dec 2024 12:49  Patient On (Oxygen Delivery Method): nasal cannula          I&O's Summary    12 Dec 2024 07:01  -  13 Dec 2024 07:00  --------------------------------------------------------  IN: 0 mL / OUT: 300 mL / NET: -300 mL    13 Dec 2024 07:01  -  13 Dec 2024 17:07  --------------------------------------------------------  IN: 0 mL / OUT: 500 mL / NET: -500 mL        Gen: lying comfortably in bed in no apparent distress  HEENT: PERRL, EOMI  Resp: scattered rhonchi, no c/w (spO2 96% [RA] HR 67)  CVS: S1S2 no m/r/g  Abd: soft NT/ND +BS  Ext: no c/c/e  Neuro: A&Ox3    # Medications  MEDICATIONS  (STANDING):  apixaban 5 milliGRAM(s) Oral every 12 hours  azithromycin  IVPB 500 milliGRAM(s) IV Intermittent every 24 hours  clopidogrel Tablet 75 milliGRAM(s) Oral daily  dextrose 5%. 1000 milliLiter(s) (100 mL/Hr) IV Continuous <Continuous>  dextrose 5%. 1000 milliLiter(s) (50 mL/Hr) IV Continuous <Continuous>  dextrose 50% Injectable 25 Gram(s) IV Push once  dextrose 50% Injectable 12.5 Gram(s) IV Push once  dextrose 50% Injectable 25 Gram(s) IV Push once  dextrose Oral Gel 15 Gram(s) Oral once  ertapenem  IVPB 1000 milliGRAM(s) IV Intermittent every 24 hours  fluticasone propionate/ salmeterol 250-50 MICROgram(s) Diskus 1 Dose(s) Inhalation two times a day  glucagon  Injectable 1 milliGRAM(s) IntraMuscular once  insulin glargine Injectable (LANTUS) 24 Unit(s) SubCutaneous before breakfast  insulin glargine Injectable (LANTUS) 14 Unit(s) SubCutaneous at bedtime  insulin lispro (ADMELOG) corrective regimen sliding scale   SubCutaneous three times a day before meals  insulin lispro (ADMELOG) corrective regimen sliding scale   SubCutaneous at bedtime  labetalol 100 milliGRAM(s) Oral every 8 hours  lacosamide 100 milliGRAM(s) Oral two times a day  levETIRAcetam 1500 milliGRAM(s) Oral two times a day  mexiletine 200 milliGRAM(s) Oral two times a day  montelukast 10 milliGRAM(s) Oral daily  multivitamin 1 Tablet(s) Oral daily  NIFEdipine XL 30 milliGRAM(s) Oral daily  pantoprazole    Tablet 40 milliGRAM(s) Oral before breakfast  rosuvastatin 5 milliGRAM(s) Oral at bedtime  senna 2 Tablet(s) Oral at bedtime  sertraline 50 milliGRAM(s) Oral daily  sodium chloride 7% Inhalation 4 milliLiter(s) Inhalation every 12 hours  tiotropium 2.5 MICROgram(s) Inhaler 2 Puff(s) Inhalation daily    MEDICATIONS  (PRN):  albuterol    90 MICROgram(s) HFA Inhaler 2 Puff(s) Inhalation every 6 hours PRN for shortness of breath and/or wheezing  diphenhydrAMINE Injectable 25 milliGRAM(s) IV Push daily PRN For IV Ertapenem      ```  ## Labs:  ** CBC: **                        11.5   8.06  )-----------( 380      ( 13 Dec 2024 10:44 )             39.9     ** Chem:  **  12-13    140  |  103  |  10  ----------------------------<  219[H]  4.9   |  22  |  0.56    Ca    9.0      13 Dec 2024 10:44  Phos  2.9     12-13  Mg     2.3     12-13    TPro  6.8  /  Alb  3.7  /  TBili  0.2  /  DBili  x   /  AST  30  /  ALT  36  /  AlkPhos  119  12-13    ** Coags: **  PT/INR - ( 12 Dec 2024 15:36 )   PT: 14.5 sec;   INR: 1.27 ratio         PTT - ( 12 Dec 2024 15:36 )  PTT:38.5 sec    CAPILLARY BLOOD GLUCOSE      POCT Blood Glucose.: 336 mg/dL (13 Dec 2024 12:48)  POCT Blood Glucose.: 407 mg/dL (13 Dec 2024 12:47)  POCT Blood Glucose.: 110 mg/dL (13 Dec 2024 08:47)  POCT Blood Glucose.: 217 mg/dL (12 Dec 2024 19:12)

## 2024-12-13 NOTE — PATIENT PROFILE ADULT - FUNCTIONAL ASSESSMENT - DAILY ACTIVITY 5.
Detail Level: Detailed
Introduction Text (Please End With A Colon): The following procedure was deferred:
Other Procedure: excision
Instructions (Optional): 15 blade, 4-0 Vicryl, 4-0 Prolenene
3 = A little assistance

## 2024-12-13 NOTE — PHYSICAL THERAPY INITIAL EVALUATION ADULT - ADDITIONAL COMMENTS
Patient lives in pvt house with daughter, one step to enter. Patient ambulated with rollator independent.   pt owns rw, bed side comode at home. HHA 12 hrs x 7 days/wk. Rest of the time family available to assist.  pt reports h/o falls. All 3 falls occurred when pt didn't use RW. This PT educated the importance of using RW which helps in avoiding falls. Pt receptive and verbalized understanding.

## 2024-12-13 NOTE — DISCHARGE NOTE PROVIDER - HOSPITAL COURSE
For full details, please see H&P, progress notes, consult notes and ancillary notes.    Discharge diagnosis: recurrent PNA    Hospital Course:    74F hx type a aortic dissection s/p repair, aortic stenosis s/p TAVR in 2023, asthma on chronic steroids, bronchiectasis, pneumonia, tracheomalacia s/p tracheoplasty, T2DM, A-fib on Eliquis, colorectal cancer status post colostomy, seizures, HTN, HLD, presenting for shortness of breath, worsening cough. and green sputum for the past 2 weeks concerning for PNA. CXR showing bibasilar opacities with mild pleural effusion. Started on Ertapenem 1g daily empirically with premedication with Benadryl (12/12 - ).    On day of discharge, patient is clinically stable with no new exam findings or acute symptoms compared to prior. The patient was seen by the attending physician on the date of discharge and deemed stable and acceptable for discharge. The patient's chronic medical conditions were treated accordingly per the patient's home medication regimen. The patient's medication reconciliation (with changes made to chronic medications), follow up appointments, discharge orders, instructions, and significant lab and diagnostic studies are as noted.     Discharge follow up action items:     1. Follow up with PCP, Pulm  2. Follow up labs: blood culture  3. Medication changes:   4. On hold medications: n/a    Patient's ordered code status: full  Patient disposition: home    Patient will be discharged to home with close follow up. For full details, please see H&P, progress notes, consult notes and ancillary notes.    Discharge diagnosis: recurrent PNA    Hospital Course:    74F hx type a aortic dissection s/p repair, aortic stenosis s/p TAVR in 2023, asthma on chronic steroids, bronchiectasis, pneumonia, tracheomalacia s/p tracheoplasty, T2DM, A-fib on Eliquis, colorectal cancer status post colostomy, seizures, HTN, HLD, presenting for shortness of breath, worsening cough. and green sputum for the past 2 weeks concerning for PNA vs acute on chronic bronchitis. CXR showing bibasilar opacities with mild pleural effusion. Started on Ertapenem 1g daily empirically with premedication with Benadryl (12/12 - ). Discussed with her pulmonologist, Dr. Allison, that he defers abx plan to primary and is amenable to discharge with/without abx given this is most likely chronic and follow up outpatient. As WBC went down with the start of abx, patient was recommended to finish the 5 days course. Home infusion was rejected from the infusion company because she needs to be premedicated with Benadryl for Ertapenem.     On day of discharge, patient is clinically stable with no new exam findings or acute symptoms compared to prior. The patient was seen by the attending physician on the date of discharge and deemed stable and acceptable for discharge. The patient's chronic medical conditions were treated accordingly per the patient's home medication regimen. The patient's medication reconciliation (with changes made to chronic medications), follow up appointments, discharge orders, instructions, and significant lab and diagnostic studies are as noted.       Discharge follow up action items:     1. Follow up with PCP, Pulm  2. Follow up labs: blood culture  3. Medication changes: Given 2 days of Ertapenem   4. On hold medications: n/a    Patient's ordered code status: full  Patient disposition: home    Patient will be discharged to home with close follow up. For full details, please see H&P, progress notes, consult notes and ancillary notes.    Discharge diagnosis: recurrent PNA    Hospital Course:    74F hx type a aortic dissection s/p repair, aortic stenosis s/p TAVR in 2023, asthma on chronic steroids, bronchiectasis, pneumonia, tracheomalacia s/p tracheoplasty, T2DM, A-fib on Eliquis, colorectal cancer status post colostomy, seizures, HTN, HLD, presenting for shortness of breath, worsening cough. and green sputum for the past 2 weeks concerning for PNA vs acute on chronic bronchitis. CXR showing bibasilar opacities with mild pleural effusion. Started on Ertapenem 1g daily empirically with premedication with Benadryl (12/12 - ). Discussed with her pulmonologist, Dr. Allison, that he defers abx plan to primary and is amenable to discharge with/without abx given this is most likely chronic and follow up outpatient. As WBC went down with the start of abx, patient was recommended to finish the 5 days course, completed on 12/17.    On day of discharge, patient is clinically stable with no new exam findings or acute symptoms compared to prior. The patient was seen by the attending physician on the date of discharge and deemed stable and acceptable for discharge. The patient's chronic medical conditions were treated accordingly per the patient's home medication regimen. The patient's medication reconciliation (with changes made to chronic medications), follow up appointments, discharge orders, instructions, and significant lab and diagnostic studies are as noted.       Medication changes: None    Discharge follow up action items:   1. Follow up with PCP, Pulm  2. Follow up labs: blood culture      Patient's ordered code status: full  Patient disposition: home  Discharge diagnosis:  - Pneumonia For full details, please see H&P, progress notes, consult notes and ancillary notes.    Discharge diagnosis: recurrent PNA    Hospital Course:    74F hx type a aortic dissection s/p repair, aortic stenosis s/p TAVR in 2023, asthma on chronic steroids, bronchiectasis, pneumonia, tracheomalacia s/p tracheoplasty, T2DM, A-fib on Eliquis, colorectal cancer status post colostomy, seizures, HTN, HLD, presenting for shortness of breath, worsening cough. and green sputum for the past 2 weeks concerning for PNA vs acute on chronic bronchitis. CXR showing bibasilar opacities with mild pleural effusion. Started on Ertapenem 1g daily empirically with premedication with Benadryl (12/12 - 12/17). Discussed with her pulmonologist, Dr. Allison, that he defers abx plan to primary and is amenable to discharge with/without abx given this is most likely chronic and follow up outpatient. As WBC went down with the start of abx, patient was recommended to finish the 5 days course, completed on 12/17.    On day of discharge, patient is clinically stable with no new exam findings or acute symptoms compared to prior. The patient was seen by the attending physician on the date of discharge and deemed stable and acceptable for discharge. The patient's chronic medical conditions were treated accordingly per the patient's home medication regimen. The patient's medication reconciliation (with changes made to chronic medications), follow up appointments, discharge orders, instructions, and significant lab and diagnostic studies are as noted.       Medication changes: None    Discharge follow up action items:   1. Follow up with PCP, Pulm  2. Follow up labs: blood culture      Patient's ordered code status: full  Patient disposition: home  Discharge diagnosis:  - Pneumonia For full details, please see H&P, progress notes, consult notes and ancillary notes.    Discharge diagnosis: recurrent PNA    Hospital Course:    74F hx type a aortic dissection s/p repair, aortic stenosis s/p TAVR in 2023, asthma on chronic steroids, bronchiectasis, pneumonia, tracheomalacia s/p tracheoplasty, T2DM, A-fib on Eliquis, colorectal cancer status post colostomy, seizures, HTN, HLD, presenting for shortness of breath, worsening cough. and green sputum for the past 2 weeks concerning for PNA vs acute on chronic bronchitis. CXR showing bibasilar opacities with mild pleural effusion. Started on Ertapenem 1g daily empirically with premedication with Benadryl (12/12 - 12/17). Discussed with her pulmonologist, Dr. Allison, that he defers abx plan to primary and is amenable to discharge with/without abx given this is most likely chronic and follow up outpatient. As WBC went down with the start of abx, patient was recommended to finish the 5 days course, completed on 12/17.    On day of discharge, patient is clinically stable with no new exam findings or acute symptoms compared to prior. The patient was seen by the attending physician on the date of discharge and deemed stable and acceptable for discharge. The patient's chronic medical conditions were treated accordingly per the patient's home medication regimen. The patient's medication reconciliation (with changes made to chronic medications), follow up appointments, discharge orders, instructions, and significant lab and diagnostic studies are as noted.       Medication changes: None    Discharge follow up action items:   1. Follow up with PCP, Pulm (Dr. Allison)  2. Follow up labs: blood culture      Patient's ordered code status: full  Patient disposition: home  Discharge diagnosis:  - Pneumonia

## 2024-12-13 NOTE — DISCHARGE NOTE PROVIDER - NSDCFUSCHEDAPPT_GEN_ALL_CORE_FT
[facility]  PULMMED [address]  Scheduled Appointment: 12/27/2024    Erick Romero  [facility]  CARDIOLOGY [address]  Scheduled Appointment: 01/22/2025    [facility]  CARDIOLOGY [address]  Scheduled Appointment: 01/22/2025    Moreno Panchal  [facility]  ENDOCRIN [address]  Scheduled Appointment: 01/22/2025    [facility]  PULMMED [address]  Scheduled Appointment: 01/24/2025    Genesis Aragon  [facility]  ELECTROPH [id number]  Scheduled Appointment: 01/27/2025    Eulalio Allison  [facility]  PULMMED [address]  Scheduled Appointment: 02/25/2025    [facility]  PULMMED [address]  Scheduled Appointment: 02/25/2025    [facility]  [address]MED  [address]  Scheduled Appointment: 02/26/2025     [facility]  PULMMED [address]  Scheduled Appointment: 12/27/2024    Erick Romero  [facility]  CARDIOLOGY [address]  Scheduled Appointment: 01/22/2025    [facility]  CARDIOLOGY [address]  Scheduled Appointment: 01/22/2025    Moreno Panchal  [facility]  ENDOCRIN [address]  Scheduled Appointment: 01/22/2025    [facility]  PULMMED [address]  Scheduled Appointment: 01/24/2025    Genesis Aragon  [facility]  E[address]ROPH [id number]  Scheduled Appointment: 01/27/2025    Albert Aguilar  [facility]  OTOLARYNG [address]  Scheduled Appointment: 01/29/2025    Eulalio Allison  [facility]  PULMMED [address]  Scheduled Appointment: 02/25/2025    [facility]  PULMMED [address]  Scheduled Appointment: 02/25/2025    [facility]  [address]  Scheduled Appointment: 02/26/2025

## 2024-12-13 NOTE — DISCHARGE NOTE PROVIDER - NSDCCPCAREPLAN_GEN_ALL_CORE_FT
PRINCIPAL DISCHARGE DIAGNOSIS  Diagnosis: Pneumonia  Assessment and Plan of Treatment: You were evaluated for shortness of breath and cough. Given extensive pulmonary history, you were started on antibiotics and steroids empirically with premedication with Benadryl for potential allergic reaction.   Please follow up with your primary care doctor and pulmonologist, Dr. Allison regarding this admission and further management.  Please return to the emergency department if you develop fever, chills, chest pain, palpitation, shortness of breath, headache, dizziness, feel like fainting, nausea, vomiting, diarrhea, abdominal pain, focal weakness, numbness or any other concerns.        SECONDARY DISCHARGE DIAGNOSES  Diagnosis: Pneumonia  Assessment and Plan of Treatment:      PRINCIPAL DISCHARGE DIAGNOSIS  Diagnosis: Pneumonia  Assessment and Plan of Treatment: You were evaluated for shortness of breath and cough. Given extensive pulmonary history, you were started on antibiotics empirically with premedication with Benadryl for potential allergic reaction. As your white blood cell count decreased with the start of antibiotics, you were recommended to finish the course as inpatient (home infusion was rejected as premedication was needed).   Please follow up with your primary care doctor and pulmonologist, Dr. Allison regarding this admission and further management.  Please return to the emergency department if you develop fever, chills, chest pain, palpitation, shortness of breath, headache, dizziness, feel like fainting, nausea, vomiting, diarrhea, abdominal pain, focal weakness, numbness or any other concerns.

## 2024-12-13 NOTE — H&P ADULT - PROBLEM SELECTOR PLAN 1
Presenting with cough, green sputum production, SOB, subjective fevers  CXR showing bibasilar opacities with mild pleural effusion  Hx of recurrent PNA, most recent admission 11/2024  Allergies to penicillin, cefepime, meropenem  Previously tolerated ertapenem with benadryl prior  RVP negative  - Start ertapenem, benadryl 20-30 min prior  - Azithromycin for atypical coverage  - MRSA PCR  - Urine legionella  - Chest PT/chest vest, airway clearance  - Wean NC as tolerated    Of note, pt initially wanted to AMA from ED however was convinced by ED to stay

## 2024-12-13 NOTE — PATIENT PROFILE ADULT - FALL HARM RISK - HARM RISK INTERVENTIONS

## 2024-12-13 NOTE — H&P ADULT - HISTORY OF PRESENT ILLNESS
74F hx type aortic dissection status postrepair, bronchiectasis, pneumonia, tracheomalacia, DM, A-fib on Eliquis, colorectal cancer status post colostomy, seizures, HTN, HLD, presenting for shortness of breath and worsening cough for the past 2 weeks.  The patient states that she went to go see her pulmonologist who told her she needs to come to the ED.  The patient states that she has been having green sputum production.  The patient has not taken anything for her symptoms as of yet.  The patient denies any chest pain.  The patient states that she is ambulating but is having difficulty ambulating secondary to her shortness of breath.  The patient denies any fevers, chills, chest pain, nausea, vomiting, headaches or visual changes.  74F hx type a aortic dissection s/p repair, aortic stenosis s/p TAVR in 2023, asthma on chronic steroids, bronchiectasis, pneumonia, tracheomalacia s/p tracheoplasty, T2DM, A-fib on Eliquis, colorectal cancer status post colostomy, seizures, HTN, HLD, presenting for shortness of breath and worsening cough for the past 2 weeks. The patient states that she went to go see her pulmonologist Dr Allison who told her she needs to come to the ED. She has been experiencing a severe cough ongoing for some time. She has been coughing up green sputum. She had a similar hospitalization for PNA in 11/2024 and at the time was discharged on IV ertapenem. She also notes she is having difficulty ambulating in a straight line which she saw her neurologist prior to arrival and thinks is due to her gabapentin (she decreased the dose). She denies chest pain, fevers, chills, abdominal pain, nausea, vomiting, diarrhea, headaches or visual changes.

## 2024-12-13 NOTE — H&P ADULT - ATTENDING COMMENTS
74F PMHx Type A aortic dissection s/p repair, AS s/p TAVR, severe asthma on chronic steroids, COPD-bronchiectasis subtype, tracheomalacia s/p tracheoplasty, T2DM, paroxysmal atrial fibrillation on Eliquis, seizures, HTN, HLD and colorectal cancer s/p colostomy who presents for SOB and worsening productive cough with green sputum. Was admitted here from 11/5-9 for similar symptoms, was given IV Ertapenem and solumedrol and discharged home. This has been a recurring presentation for the patient. She saw Dr. Allison on 12/9 since her symptoms flared up again. Presented here as her symptoms weren't resolving despite outpatient measures. Currently requiring 2LNC, CXR showing mild hazy opacities of both costophrenic angles that could represent atelectasis vs pleural effusions. Admitted for AHRF that is likely multifactorial in setting of severe asthma, bronchiectasis, and tracheomalacia with possible lower respiratory tract infection.    #AHRF  #COPD - Bronchiectasis subtype  #Severe persistent asthma dependent on chronic steroids  #T2DM  #Paroxysmal atrial fibrillation on Eliquis  #Seizure disorder  #Type A aortic dissection s/p repair  #AS s/p TAVR    - Currently saturating well on 2LNC, try to wean off while maintaining SpO2 >92%  - Start IV solumedrol 30mg daily, patient was tapered down to PO 8mg BID, seems like she will need uptitration once again  - Can continue w/ IV Ertapenem + Azithromycin for now  - F/u full RVP, procalcitonin, sputum culture, MRSA PCR  - C/w montelukast, Advair + Spiriva for home breo, duonebs q6hrs, hypertonic saline nebulizers, acapella  - Per outpatient pulm note, her chronic SOB also has contributing etiologies of GERD, allergic rhinitis and poor breathing mechanics  - Do not expect this issue of hers to ever resolve completely, she is high risk of readmission for this same issue due to her overlap of chronic conditions  - Was hypoglycemic to 48 in ED, sugars rebounded to low 200s after glucose, Can start Lantus at lower amount at 24u AM + 10u QHS and sliding scale insulin, would monitor sugars carefully  - C/w Eliquis and labetalol  - C/w Lacosamide and Keppra  - C/w plavix and nifedipine  - Unclear why patient taking mexiletine, but has been taking it chronically, continue for now    Rest of care per resident note

## 2024-12-13 NOTE — DISCHARGE NOTE PROVIDER - NSDCCPTREATMENT_GEN_ALL_CORE_FT
PRINCIPAL PROCEDURE  Procedure: XR chest AP  Findings and Treatment: FINDINGS: 12/12/24  Median sternotomy wires. TAVR.  The heart is enlarged.  The lungs are clear.  There is no pneumothorax.  Mild hazy opacities of both costophrenic angles likely atelectasis or   small pleural effusions.  IMPRESSION:  Bibasilar atelectasis or effusion. Underlying infection is not excluded.

## 2024-12-13 NOTE — DISCHARGE NOTE PROVIDER - NSDCMRMEDTOKEN_GEN_ALL_CORE_FT
Albuterol (Eqv-ProAir HFA) 90 mcg/inh inhalation aerosol: 2 puff(s) inhaled every 6 hours as needed for  shortness of breath and/or wheezing  apixaban 5 mg oral tablet: 1 tab(s) orally every 12 hours  ascorbic acid 500 mg oral tablet: 1 tab(s) orally once a day  Basaglar KwikPen 100 units/mL subcutaneous solution: 30 unit(s) subcutaneous once a day  Basaglar KwikPen 100 units/mL subcutaneous solution: 18 unit(s) subcutaneous once a day (at bedtime)  Breo Ellipta 200 mcg-25 mcg/inh inhalation powder: 1 puff(s) inhaled once a day  clopidogrel 75 mg oral tablet: 1 tab(s) orally once a day  DuoNeb 0.5 mg-2.5 mg/3 mL inhalation solution: 3 milliliter(s) by nebulizer 4 times a day  ferrous sulfate 325 mg (65 mg elemental iron) oral tablet: 1 tab(s) orally once a day  formoterol 20 mcg/2 mL inhalation solution: 2 milliliter(s) inhaled every 12 hours  gabapentin 100 mg oral capsule: 1 cap(s) orally every 8 hours  glycopyrrolate 1 mg oral tablet: 1 tab(s) orally 3 times a day  guaiFENesin 100 mg/5 mL oral liquid: 10 milliliter(s) orally every 6 hours As needed Cough  HumaLOG KwikPen 100 units/mL injectable solution: take before meals three times a day per sliding scale &lt;100 3U; 101-140 5U; 141-200 7U; 201-250 10U; 251-300 11U; &gt;300 12U  labetalol 100 mg oral tablet: 1 tab(s) orally every 8 hours  lacosamide 100 mg oral tablet: 1 tab(s) orally 2 times a day  levETIRAcetam 750 mg oral tablet, extended release: 2 tab(s) orally 2 times a day  methylPREDNISolone 8 mg oral tablet: 1 tab(s) orally 2 times a day  mexiletine 200 mg oral capsule: 1 cap(s) orally 2 times a day  montelukast 10 mg oral tablet: 1 tab(s) orally once a day  Multiple Vitamins oral tablet: 1 tab(s) orally once a day  NIFEdipine 30 mg oral tablet, extended release: 1 tab(s) orally once a day  Ohtuvayre 3 mg/ 2.5mL inhalation suspension: 3 milligram(s) by nebulizer 2 times a day  pantoprazole 40 mg oral delayed release tablet: 1 tab(s) orally once a day  polyethylene glycol 3350 oral powder for reconstitution: 17 gram(s) orally once a day As needed Constipation  rosuvastatin 5 mg oral tablet: 1 tab(s) orally once a day  senna leaf extract oral tablet: 2 tab(s) orally once a day (at bedtime)  sertraline 50 mg oral tablet: 1 tab(s) orally once a day  sodium chloride 7% inhalation solution: 1 application inhaled once a day (at bedtime)  Tezspire Pre-filled Syringe 210 mg/1.91 mL subcutaneous solution: 210 milligram(s) subcutaneously once a month   Albuterol (Eqv-ProAir HFA) 90 mcg/inh inhalation aerosol: 2 puff(s) inhaled every 6 hours as needed for  shortness of breath and/or wheezing  apixaban 5 mg oral tablet: 1 tab(s) orally every 12 hours  ascorbic acid 500 mg oral tablet: 1 tab(s) orally once a day  Basaglar KwikPen 100 units/mL subcutaneous solution: 30 unit(s) subcutaneous once a day  Basaglar KwikPen 100 units/mL subcutaneous solution: 18 unit(s) subcutaneous once a day (at bedtime)  Benadryl IV 25mg to premedicate 30 mins prior to Ertapenem IV from 12/13/24 - 12/17/24.: Benadryl IV 25mg to premedicate prior to Ertapenem   ICD: J18.8 Pneumonia; T78.40XA Allergy  PCP: Dr. Bon Samuels  Fax: [phone]  Breo Ellipta 200 mcg-25 mcg/inh inhalation powder: 1 puff(s) inhaled once a day  clopidogrel 75 mg oral tablet: 1 tab(s) orally once a day  DuoNeb 0.5 mg-2.5 mg/3 mL inhalation solution: 3 milliliter(s) by nebulizer 4 times a day  Ertapenem IV 1g every 24 hours from 12/13/24 - 12/17/24.: Ertapenem 1g every 24 hours from 12/13/24 - 12/17/24.   ICD: J18.8 Pneumonia  PCP: Dr. Bon Samuels  Fax: [phone]  ferrous sulfate 325 mg (65 mg elemental iron) oral tablet: 1 tab(s) orally once a day  formoterol 20 mcg/2 mL inhalation solution: 2 milliliter(s) inhaled every 12 hours  gabapentin 100 mg oral capsule: 1 cap(s) orally every 8 hours  glycopyrrolate 1 mg oral tablet: 1 tab(s) orally 3 times a day  guaiFENesin 100 mg/5 mL oral liquid: 10 milliliter(s) orally every 6 hours As needed Cough  HumaLOG KwikPen 100 units/mL injectable solution: take before meals three times a day per sliding scale &lt;100 3U; 101-140 5U; 141-200 7U; 201-250 10U; 251-300 11U; &gt;300 12U  labetalol 100 mg oral tablet: 1 tab(s) orally every 8 hours  lacosamide 100 mg oral tablet: 1 tab(s) orally 2 times a day  levETIRAcetam 750 mg oral tablet, extended release: 2 tab(s) orally 2 times a day  methylPREDNISolone 8 mg oral tablet: 1 tab(s) orally 2 times a day  mexiletine 200 mg oral capsule: 1 cap(s) orally 2 times a day  montelukast 10 mg oral tablet: 1 tab(s) orally once a day  Multiple Vitamins oral tablet: 1 tab(s) orally once a day  NIFEdipine 30 mg oral tablet, extended release: 1 tab(s) orally once a day  Ohtuvayre 3 mg/ 2.5mL inhalation suspension: 3 milligram(s) by nebulizer 2 times a day  pantoprazole 40 mg oral delayed release tablet: 1 tab(s) orally once a day  polyethylene glycol 3350 oral powder for reconstitution: 17 gram(s) orally once a day As needed Constipation  rosuvastatin 5 mg oral tablet: 1 tab(s) orally once a day  senna leaf extract oral tablet: 2 tab(s) orally once a day (at bedtime)  sertraline 50 mg oral tablet: 1 tab(s) orally once a day  sodium chloride 7% inhalation solution: 1 application inhaled once a day (at bedtime)  Tezspire Pre-filled Syringe 210 mg/1.91 mL subcutaneous solution: 210 milligram(s) subcutaneously once a month   Albuterol (Eqv-ProAir HFA) 90 mcg/inh inhalation aerosol: 2 puff(s) inhaled every 6 hours as needed for  shortness of breath and/or wheezing  apixaban 5 mg oral tablet: 1 tab(s) orally every 12 hours  ascorbic acid 500 mg oral tablet: 1 tab(s) orally once a day  Basaglar KwikPen 100 units/mL subcutaneous solution: 30 unit(s) subcutaneous once a day  Basaglar KwikPen 100 units/mL subcutaneous solution: 18 unit(s) subcutaneous once a day (at bedtime)  Breo Ellipta 200 mcg-25 mcg/inh inhalation powder: 1 puff(s) inhaled once a day  clopidogrel 75 mg oral tablet: 1 tab(s) orally once a day  DuoNeb 0.5 mg-2.5 mg/3 mL inhalation solution: 3 milliliter(s) by nebulizer 4 times a day  ferrous sulfate 325 mg (65 mg elemental iron) oral tablet: 1 tab(s) orally once a day  formoterol 20 mcg/2 mL inhalation solution: 2 milliliter(s) inhaled every 12 hours  gabapentin 100 mg oral capsule: 1 cap(s) orally every 8 hours  glycopyrrolate 1 mg oral tablet: 1 tab(s) orally 3 times a day  guaiFENesin 100 mg/5 mL oral liquid: 10 milliliter(s) orally every 6 hours As needed Cough  HumaLOG KwikPen 100 units/mL injectable solution: injectable 3 times a day take before meals three times a day per sliding scale &lt;100 3U; 101-140 5U; 141-200 7U; 201-250 10U; 251-300 11U; &gt;300 12U  labetalol 100 mg oral tablet: 1 tab(s) orally every 8 hours  lacosamide 100 mg oral tablet: 1 tab(s) orally 2 times a day  levETIRAcetam 750 mg oral tablet, extended release: 2 tab(s) orally 2 times a day  methylPREDNISolone 8 mg oral tablet: 1 tab(s) orally 2 times a day  mexiletine 200 mg oral capsule: 1 cap(s) orally 2 times a day  montelukast 10 mg oral tablet: 1 tab(s) orally once a day  Multiple Vitamins oral tablet: 1 tab(s) orally once a day  NIFEdipine 30 mg oral tablet, extended release: 1 tab(s) orally once a day  Ohtuvayre 3 mg/ 2.5mL inhalation suspension: 3 milligram(s) by nebulizer 2 times a day  pantoprazole 40 mg oral delayed release tablet: 1 tab(s) orally once a day  polyethylene glycol 3350 oral powder for reconstitution: 17 gram(s) orally once a day As needed Constipation  rosuvastatin 5 mg oral tablet: 1 tab(s) orally once a day  senna leaf extract oral tablet: 2 tab(s) orally once a day (at bedtime)  sertraline 50 mg oral tablet: 1 tab(s) orally once a day  sodium chloride 7% inhalation solution: 1 application inhaled once a day (at bedtime)  Tezspire Pre-filled Syringe 210 mg/1.91 mL subcutaneous solution: 210 milligram(s) subcutaneously once a month

## 2024-12-13 NOTE — DISCHARGE NOTE PROVIDER - PROVIDER TOKENS
PROVIDER:[TOKEN:[[id number]],SCHEDULEDAPPT:[12/27/2024],SCHEDULEDAPPTTIME:[02:00 PM],ESTABLISHEDPATIENT:[T]]

## 2024-12-13 NOTE — H&P ADULT - PROBLEM SELECTOR PLAN 5
On basaglar 30 qd in AM and 18 qhs  - C/w lantus 24u qd and 14U qhs  - SSI premeal On basaglar 30 qd in AM and 18 qhs  - C/w lantus 24u qd and 10U qhs  - SSI premeal

## 2024-12-13 NOTE — H&P ADULT - ASSESSMENT
74F hx type a aortic dissection s/p repair, aortic stenosis s/p TAVR in 2023, asthma on chronic steroids, bronchiectasis, pneumonia, tracheomalacia s/p tracheoplasty, T2DM, A-fib on Eliquis, colorectal cancer status post colostomy, seizures, HTN, HLD, presenting for shortness of breath, worsening cough. and green sputum for the past 2 weeks concerning for PNA.

## 2024-12-13 NOTE — DISCHARGE NOTE PROVIDER - CARE PROVIDER_API CALL
Eulalio Allison Steven  Pulmonary Disease  [address] [facility] [address] [address], [address]  [place] [address]  Phone: [phone]  Fax: [phone]  Established Patient  Scheduled Appointment: 12/27/2024 02:00 PM

## 2024-12-13 NOTE — PATIENT PROFILE ADULT - FUNCTIONAL ASSESSMENT - BASIC MOBILITY 6.
2-calculated by average/Not able to assess (calculate score using [facility] averaging method)

## 2024-12-13 NOTE — PROGRESS NOTE ADULT - SUBJECTIVE AND OBJECTIVE BOX
PROGRESS NOTE:   Authored by Dr. Kendra Nguyen MD (PGY-1).  via TEAMS    Patient is a 74y old  Female who presents with a chief complaint of     SUBJECTIVE / OVERNIGHT EVENTS:  No acute events overnight.     ADDITIONAL REVIEW OF SYSTEMS:  Patient denies fevers, chills, chest pain, shortness of breath, nausea, abdominal pain, diarrhea, constipation, dysuria, leg swelling, headache, light headedness.    MEDICATIONS  (STANDING):  apixaban 5 milliGRAM(s) Oral every 12 hours  azithromycin  IVPB 500 milliGRAM(s) IV Intermittent every 24 hours  clopidogrel Tablet 75 milliGRAM(s) Oral daily  dextrose 5%. 1000 milliLiter(s) (100 mL/Hr) IV Continuous <Continuous>  dextrose 5%. 1000 milliLiter(s) (50 mL/Hr) IV Continuous <Continuous>  dextrose 50% Injectable 25 Gram(s) IV Push once  dextrose 50% Injectable 12.5 Gram(s) IV Push once  dextrose 50% Injectable 25 Gram(s) IV Push once  dextrose Oral Gel 15 Gram(s) Oral once  ertapenem  IVPB 1000 milliGRAM(s) IV Intermittent every 24 hours  fluticasone propionate/ salmeterol 250-50 MICROgram(s) Diskus 1 Dose(s) Inhalation two times a day  glucagon  Injectable 1 milliGRAM(s) IntraMuscular once  insulin glargine Injectable (LANTUS) 24 Unit(s) SubCutaneous before breakfast  insulin glargine Injectable (LANTUS) 14 Unit(s) SubCutaneous at bedtime  insulin lispro (ADMELOG) corrective regimen sliding scale   SubCutaneous three times a day before meals  insulin lispro (ADMELOG) corrective regimen sliding scale   SubCutaneous at bedtime  labetalol 100 milliGRAM(s) Oral every 8 hours  lacosamide 100 milliGRAM(s) Oral two times a day  levETIRAcetam 1500 milliGRAM(s) Oral two times a day  methylPREDNISolone sodium succinate Injectable 30 milliGRAM(s) IV Push daily  mexiletine 200 milliGRAM(s) Oral two times a day  montelukast 10 milliGRAM(s) Oral daily  multivitamin 1 Tablet(s) Oral daily  NIFEdipine XL 30 milliGRAM(s) Oral daily  pantoprazole    Tablet 40 milliGRAM(s) Oral before breakfast  rosuvastatin 5 milliGRAM(s) Oral at bedtime  senna 2 Tablet(s) Oral at bedtime  sertraline 50 milliGRAM(s) Oral daily  sodium chloride 7% Inhalation 4 milliLiter(s) Inhalation every 12 hours  tiotropium 2.5 MICROgram(s) Inhaler 2 Puff(s) Inhalation daily    MEDICATIONS  (PRN):  albuterol    90 MICROgram(s) HFA Inhaler 2 Puff(s) Inhalation every 6 hours PRN for shortness of breath and/or wheezing  polyethylene glycol 3350 17 Gram(s) Oral daily PRN Constipation      CAPILLARY BLOOD GLUCOSE      POCT Blood Glucose.: 217 mg/dL (12 Dec 2024 19:12)  POCT Blood Glucose.: 65 mg/dL (12 Dec 2024 16:54)  POCT Blood Glucose.: 89 mg/dL (12 Dec 2024 15:26)  POCT Blood Glucose.: 66 mg/dL (12 Dec 2024 14:41)    I&O's Summary    12 Dec 2024 07:01  -  13 Dec 2024 07:00  --------------------------------------------------------  IN: 0 mL / OUT: 300 mL / NET: -300 mL        PHYSICAL EXAM:  Vital Signs Last 24 Hrs  T(C): 36.4 (13 Dec 2024 05:09), Max: 36.8 (12 Dec 2024 12:42)  T(F): 97.5 (13 Dec 2024 05:09), Max: 98.2 (12 Dec 2024 12:42)  HR: 60 (13 Dec 2024 05:09) (50 - 77)  BP: 160/78 (13 Dec 2024 05:09) (117/73 - 160/78)  BP(mean): 74 (12 Dec 2024 22:57) (70 - 74)  RR: 20 (13 Dec 2024 05:09) (20 - 24)  SpO2: 100% (13 Dec 2024 05:09) (98% - 100%)    Parameters below as of 13 Dec 2024 05:09  Patient On (Oxygen Delivery Method): nasal cannula  O2 Flow (L/min): 2      GENERAL: NAD, lying in bed comfortably  HEAD:  Atraumatic, Normocephalic  EYES: EOMI, PERRLA, conjunctiva and sclera clear  ENT: Moist mucous membranes  NECK: Supple, No JVD  CHEST/LUNG: + right sided crackles worse at base, No rhonchi, wheezing, or rubs. Coughing with deep breathing  HEART: Regular rate and rhythm; No murmurs, rubs, or gallops  ABDOMEN: BSx4; Soft, nontender, nondistended  EXTREMITIES:  2+ Peripheral Pulses, brisk capillary refill. No clubbing, cyanosis, or edema  NERVOUS SYSTEM:  A&Ox3, no focal deficits   SKIN: No rashes or lesions    LABS:                        10.9   11.93 )-----------( 372      ( 12 Dec 2024 15:36 )             36.2         144  |  105  |  16  ----------------------------<  48[LL]  4.4   |  27  |  0.64    Ca    9.4      12 Dec 2024 15:36    TPro  7.0  /  Alb  4.1  /  TBili  0.2  /  DBili  x   /  AST  62[H]  /  ALT  47[H]  /  AlkPhos  134[H]  12-12    PT/INR - ( 12 Dec 2024 15:36 )   PT: 14.5 sec;   INR: 1.27 ratio         PTT - ( 12 Dec 2024 15:36 )  PTT:38.5 sec      Urinalysis Basic - ( 12 Dec 2024 16:13 )    Color: Yellow / Appearance: Clear / S.017 / pH: x  Gluc: x / Ketone: Negative mg/dL  / Bili: Negative / Urobili: 1.0 mg/dL   Blood: x / Protein: Negative mg/dL / Nitrite: Negative   Leuk Esterase: Trace / RBC: 2 /HPF / WBC 0 /HPF   Sq Epi: x / Non Sq Epi: 0 /HPF / Bacteria: Negative /HPF          Tele Reviewed:    RADIOLOGY & ADDITIONAL TESTS:  Results Reviewed:   Imaging Personally Reviewed:  Electrocardiogram Personally Reviewed:     PROGRESS NOTE:   Authored by Dr. Kendra Nguyen MD (PGY-1).  via TEAMS    Patient is a 74y old  Female who presents with a chief complaint of     HPI:  74F hx type a aortic dissection s/p repair, aortic stenosis s/p TAVR in , asthma on chronic steroids, bronchiectasis, pneumonia, tracheomalacia s/p tracheoplasty, T2DM, A-fib on Eliquis, colorectal cancer status post colostomy, seizures, HTN, HLD, presenting for shortness of breath and worsening cough for the past 2 weeks. The patient states that she went to go see her pulmonologist Dr Allison who told her she needs to come to the ED. She has been experiencing a severe cough ongoing for some time. She has been coughing up green sputum. She had a similar hospitalization for PNA in 2024 and at the time was discharged on IV ertapenem. She also notes she is having difficulty ambulating in a straight line which she saw her neurologist prior to arrival and thinks is due to her gabapentin (she decreased the dose). She denies chest pain, fevers, chills, abdominal pain, nausea, vomiting, diarrhea, headaches or visual changes.     SUBJECTIVE / OVERNIGHT EVENTS:  No acute events overnight. Denies SOB or difficulty breathing on NC 4L. Denies CP.    ADDITIONAL REVIEW OF SYSTEMS:  Patient denies fevers, chills, chest pain, shortness of breath, nausea, abdominal pain, diarrhea, constipation, dysuria, leg swelling, headache, light headedness.    MEDICATIONS  (STANDING):  apixaban 5 milliGRAM(s) Oral every 12 hours  azithromycin  IVPB 500 milliGRAM(s) IV Intermittent every 24 hours  clopidogrel Tablet 75 milliGRAM(s) Oral daily  dextrose 5%. 1000 milliLiter(s) (100 mL/Hr) IV Continuous <Continuous>  dextrose 5%. 1000 milliLiter(s) (50 mL/Hr) IV Continuous <Continuous>  dextrose 50% Injectable 25 Gram(s) IV Push once  dextrose 50% Injectable 12.5 Gram(s) IV Push once  dextrose 50% Injectable 25 Gram(s) IV Push once  dextrose Oral Gel 15 Gram(s) Oral once  ertapenem  IVPB 1000 milliGRAM(s) IV Intermittent every 24 hours  fluticasone propionate/ salmeterol 250-50 MICROgram(s) Diskus 1 Dose(s) Inhalation two times a day  glucagon  Injectable 1 milliGRAM(s) IntraMuscular once  insulin glargine Injectable (LANTUS) 24 Unit(s) SubCutaneous before breakfast  insulin glargine Injectable (LANTUS) 14 Unit(s) SubCutaneous at bedtime  insulin lispro (ADMELOG) corrective regimen sliding scale   SubCutaneous three times a day before meals  insulin lispro (ADMELOG) corrective regimen sliding scale   SubCutaneous at bedtime  labetalol 100 milliGRAM(s) Oral every 8 hours  lacosamide 100 milliGRAM(s) Oral two times a day  levETIRAcetam 1500 milliGRAM(s) Oral two times a day  methylPREDNISolone sodium succinate Injectable 30 milliGRAM(s) IV Push daily  mexiletine 200 milliGRAM(s) Oral two times a day  montelukast 10 milliGRAM(s) Oral daily  multivitamin 1 Tablet(s) Oral daily  NIFEdipine XL 30 milliGRAM(s) Oral daily  pantoprazole    Tablet 40 milliGRAM(s) Oral before breakfast  rosuvastatin 5 milliGRAM(s) Oral at bedtime  senna 2 Tablet(s) Oral at bedtime  sertraline 50 milliGRAM(s) Oral daily  sodium chloride 7% Inhalation 4 milliLiter(s) Inhalation every 12 hours  tiotropium 2.5 MICROgram(s) Inhaler 2 Puff(s) Inhalation daily    MEDICATIONS  (PRN):  albuterol    90 MICROgram(s) HFA Inhaler 2 Puff(s) Inhalation every 6 hours PRN for shortness of breath and/or wheezing  polyethylene glycol 3350 17 Gram(s) Oral daily PRN Constipation      CAPILLARY BLOOD GLUCOSE      POCT Blood Glucose.: 217 mg/dL (12 Dec 2024 19:12)  POCT Blood Glucose.: 65 mg/dL (12 Dec 2024 16:54)  POCT Blood Glucose.: 89 mg/dL (12 Dec 2024 15:26)  POCT Blood Glucose.: 66 mg/dL (12 Dec 2024 14:41)    I&O's Summary    12 Dec 2024 07:01  -  13 Dec 2024 07:00  --------------------------------------------------------  IN: 0 mL / OUT: 300 mL / NET: -300 mL        PHYSICAL EXAM:  Vital Signs Last 24 Hrs  T(C): 36.4 (13 Dec 2024 05:09), Max: 36.8 (12 Dec 2024 12:42)  T(F): 97.5 (13 Dec 2024 05:09), Max: 98.2 (12 Dec 2024 12:42)  HR: 60 (13 Dec 2024 05:09) (50 - 77)  BP: 160/78 (13 Dec 2024 05:09) (117/73 - 160/78)  BP(mean): 74 (12 Dec 2024 22:57) (70 - 74)  RR: 20 (13 Dec 2024 05:09) (20 - 24)  SpO2: 100% (13 Dec 2024 05:09) (98% - 100%)    Parameters below as of 13 Dec 2024 05:09  Patient On (Oxygen Delivery Method): nasal cannula  O2 Flow (L/min): 2      GENERAL: NAD, lying in bed comfortably on NC 4L  HEAD:  Atraumatic, Normocephalic  EYES: EOMI, PERRLA, conjunctiva and sclera clear  ENT: Moist mucous membranes  NECK: Supple, No JVD  CHEST/LUNG: + right sided crackles, No rhonchi, wheezing, or rubs.   HEART: Regular rate and rhythm; No murmurs, rubs, or gallops  ABDOMEN: BSx4; Soft, nontender, nondistended  EXTREMITIES:  2+ Peripheral Pulses, brisk capillary refill. No clubbing, cyanosis, or edema  NERVOUS SYSTEM:  A&Ox3, no focal deficits   SKIN: No rashes or lesions    LABS:                        10.9   11.93 )-----------( 372      ( 12 Dec 2024 15:36 )             36.2     12-12    144  |  105  |  16  ----------------------------<  48[LL]  4.4   |  27  |  0.64    Ca    9.4      12 Dec 2024 15:36    TPro  7.0  /  Alb  4.1  /  TBili  0.2  /  DBili  x   /  AST  62[H]  /  ALT  47[H]  /  AlkPhos  134[H]  12-12    PT/INR - ( 12 Dec 2024 15:36 )   PT: 14.5 sec;   INR: 1.27 ratio         PTT - ( 12 Dec 2024 15:36 )  PTT:38.5 sec      Urinalysis Basic - ( 12 Dec 2024 16:13 )    Color: Yellow / Appearance: Clear / S.017 / pH: x  Gluc: x / Ketone: Negative mg/dL  / Bili: Negative / Urobili: 1.0 mg/dL   Blood: x / Protein: Negative mg/dL / Nitrite: Negative   Leuk Esterase: Trace / RBC: 2 /HPF / WBC 0 /HPF   Sq Epi: x / Non Sq Epi: 0 /HPF / Bacteria: Negative /HPF          Tele Reviewed:    RADIOLOGY & ADDITIONAL TESTS:  Results Reviewed:   Imaging Personally Reviewed:  Electrocardiogram Personally Reviewed:     PROGRESS NOTE:   Authored by Dr. Kendra Nguyen MD (PGY-1).  via TEAMS    Patient is a 74y old  Female who presents with a chief complaint of     HPI:  74F hx type a aortic dissection s/p repair, aortic stenosis s/p TAVR in , asthma on chronic steroids, bronchiectasis, pneumonia, tracheomalacia s/p tracheoplasty, T2DM, A-fib on Eliquis, colorectal cancer status post colostomy, seizures, HTN, HLD, presenting for shortness of breath and worsening cough for the past 2 weeks. The patient states that she went to go see her pulmonologist Dr Allison who told her she needs to come to the ED. She has been experiencing a severe cough ongoing for some time. She has been coughing up green sputum. She had a similar hospitalization for PNA in 2024 and at the time was discharged on IV ertapenem. She also notes she is having difficulty ambulating in a straight line which she saw her neurologist prior to arrival and thinks is due to her gabapentin (she decreased the dose). She denies chest pain, fevers, chills, abdominal pain, nausea, vomiting, diarrhea, headaches or visual changes.     SUBJECTIVE / OVERNIGHT EVENTS:  No acute events overnight. Denies SOB or difficulty breathing on NC 3L. Denies CP.    ADDITIONAL REVIEW OF SYSTEMS:  Patient denies fevers, chills, chest pain, shortness of breath, nausea, abdominal pain, diarrhea, constipation, dysuria, leg swelling, headache, light headedness.    MEDICATIONS  (STANDING):  apixaban 5 milliGRAM(s) Oral every 12 hours  azithromycin  IVPB 500 milliGRAM(s) IV Intermittent every 24 hours  clopidogrel Tablet 75 milliGRAM(s) Oral daily  dextrose 5%. 1000 milliLiter(s) (100 mL/Hr) IV Continuous <Continuous>  dextrose 5%. 1000 milliLiter(s) (50 mL/Hr) IV Continuous <Continuous>  dextrose 50% Injectable 25 Gram(s) IV Push once  dextrose 50% Injectable 12.5 Gram(s) IV Push once  dextrose 50% Injectable 25 Gram(s) IV Push once  dextrose Oral Gel 15 Gram(s) Oral once  ertapenem  IVPB 1000 milliGRAM(s) IV Intermittent every 24 hours  fluticasone propionate/ salmeterol 250-50 MICROgram(s) Diskus 1 Dose(s) Inhalation two times a day  glucagon  Injectable 1 milliGRAM(s) IntraMuscular once  insulin glargine Injectable (LANTUS) 24 Unit(s) SubCutaneous before breakfast  insulin glargine Injectable (LANTUS) 14 Unit(s) SubCutaneous at bedtime  insulin lispro (ADMELOG) corrective regimen sliding scale   SubCutaneous three times a day before meals  insulin lispro (ADMELOG) corrective regimen sliding scale   SubCutaneous at bedtime  labetalol 100 milliGRAM(s) Oral every 8 hours  lacosamide 100 milliGRAM(s) Oral two times a day  levETIRAcetam 1500 milliGRAM(s) Oral two times a day  methylPREDNISolone sodium succinate Injectable 30 milliGRAM(s) IV Push daily  mexiletine 200 milliGRAM(s) Oral two times a day  montelukast 10 milliGRAM(s) Oral daily  multivitamin 1 Tablet(s) Oral daily  NIFEdipine XL 30 milliGRAM(s) Oral daily  pantoprazole    Tablet 40 milliGRAM(s) Oral before breakfast  rosuvastatin 5 milliGRAM(s) Oral at bedtime  senna 2 Tablet(s) Oral at bedtime  sertraline 50 milliGRAM(s) Oral daily  sodium chloride 7% Inhalation 4 milliLiter(s) Inhalation every 12 hours  tiotropium 2.5 MICROgram(s) Inhaler 2 Puff(s) Inhalation daily    MEDICATIONS  (PRN):  albuterol    90 MICROgram(s) HFA Inhaler 2 Puff(s) Inhalation every 6 hours PRN for shortness of breath and/or wheezing  polyethylene glycol 3350 17 Gram(s) Oral daily PRN Constipation      CAPILLARY BLOOD GLUCOSE      POCT Blood Glucose.: 217 mg/dL (12 Dec 2024 19:12)  POCT Blood Glucose.: 65 mg/dL (12 Dec 2024 16:54)  POCT Blood Glucose.: 89 mg/dL (12 Dec 2024 15:26)  POCT Blood Glucose.: 66 mg/dL (12 Dec 2024 14:41)    I&O's Summary    12 Dec 2024 07:01  -  13 Dec 2024 07:00  --------------------------------------------------------  IN: 0 mL / OUT: 300 mL / NET: -300 mL        PHYSICAL EXAM:  Vital Signs Last 24 Hrs  T(C): 36.4 (13 Dec 2024 05:09), Max: 36.8 (12 Dec 2024 12:42)  T(F): 97.5 (13 Dec 2024 05:09), Max: 98.2 (12 Dec 2024 12:42)  HR: 60 (13 Dec 2024 05:09) (50 - 77)  BP: 160/78 (13 Dec 2024 05:09) (117/73 - 160/78)  BP(mean): 74 (12 Dec 2024 22:57) (70 - 74)  RR: 20 (13 Dec 2024 05:09) (20 - 24)  SpO2: 100% (13 Dec 2024 05:09) (98% - 100%)    Parameters below as of 13 Dec 2024 05:09  Patient On (Oxygen Delivery Method): nasal cannula  O2 Flow (L/min): 2      GENERAL: NAD, lying in bed comfortably on NC 3L  HEAD:  Atraumatic, Normocephalic  EYES: EOMI, PERRLA, conjunctiva and sclera clear  ENT: Moist mucous membranes  NECK: Supple, No JVD  CHEST/LUNG: + right sided crackles, No rhonchi, wheezing, or rubs.   HEART: Regular rate and rhythm; No murmurs, rubs, or gallops  ABDOMEN: BSx4; Soft, nontender, nondistended  EXTREMITIES:  2+ Peripheral Pulses, brisk capillary refill. No clubbing, cyanosis, or edema  NERVOUS SYSTEM:  A&Ox3, no focal deficits   SKIN: No rashes or lesions    LABS:                        10.9   11.93 )-----------( 372      ( 12 Dec 2024 15:36 )             36.2     12-12    144  |  105  |  16  ----------------------------<  48[LL]  4.4   |  27  |  0.64    Ca    9.4      12 Dec 2024 15:36    TPro  7.0  /  Alb  4.1  /  TBili  0.2  /  DBili  x   /  AST  62[H]  /  ALT  47[H]  /  AlkPhos  134[H]  12-12    PT/INR - ( 12 Dec 2024 15:36 )   PT: 14.5 sec;   INR: 1.27 ratio         PTT - ( 12 Dec 2024 15:36 )  PTT:38.5 sec      Urinalysis Basic - ( 12 Dec 2024 16:13 )    Color: Yellow / Appearance: Clear / S.017 / pH: x  Gluc: x / Ketone: Negative mg/dL  / Bili: Negative / Urobili: 1.0 mg/dL   Blood: x / Protein: Negative mg/dL / Nitrite: Negative   Leuk Esterase: Trace / RBC: 2 /HPF / WBC 0 /HPF   Sq Epi: x / Non Sq Epi: 0 /HPF / Bacteria: Negative /HPF          Tele Reviewed:    RADIOLOGY & ADDITIONAL TESTS:  Results Reviewed:   Imaging Personally Reviewed:  Electrocardiogram Personally Reviewed:

## 2024-12-13 NOTE — ADVANCED PRACTICE NURSE CONSULT - ASSESSMENT
Midline Catheter Insertion Note    Catheter type: 4F  : Bard  Power Injectable: Yes  Ref#: [id number]  Procedure assisted by: Migdalia Proctor RN    Time out was preformed, confirming the patient's first and last name, date of birth, procedure, and correct site prior to state of procedure.  Patient was placed with HOB 30 degrees. Patient placement site was prepped with chlorhexidine solution, then draped using maximum sterile barrier protection. Using the Bard Site Rite 8, the catheter was placed using the Modified Seldinger Technique. The area was injected with 2 ml of 1% lidocaine. Using the ultrasound, the catheter was introduced. Strict adherence to outline aseptic technique including handwashing, glove and gown, utilizing mask and cap, plus draping the patient with a sterile drape was observed. Upon completion of line placement, the insertion site was covered with a sterile occlusive CHG dressing. Pt tolerated procedure well.   All materials used for catheter insertion, including the intact guide wires, were accounted for at the end of the procedure.    Number of attempts: 1  Complications/Comments: none  Emergency Placement: no    Site: new site   Anatomical Site of insertion: R Brachial  Catheter size/length: 4 Fr., 20 cm.  US guided Bard SL Power MIDLINE placed

## 2024-12-13 NOTE — PHYSICAL THERAPY INITIAL EVALUATION ADULT - PERTINENT HX OF CURRENT PROBLEM, REHAB EVAL
74F hx type a aortic dissection s/p repair, aortic stenosis s/p TAVR in 2023, asthma on chronic steroids, bronchiectasis, pneumonia, tracheomalacia s/p tracheoplasty, T2DM, A-fib on Eliquis, colorectal cancer status post colostomy, seizures, HTN, HLD, presenting for shortness of breath, worsening cough. and green sputum for the past 2 weeks concerning for PNA.  Chest XR: Bibasilar atelectasis or effusion. Underlying infection is not excluded.

## 2024-12-13 NOTE — H&P ADULT - NSICDXPASTSURGICALHX_GEN_ALL_CORE_FT
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    H/O pelvic surgery 5 years ago - s/p fracture    H/O total knee replacement, bilateral 5 years ago    History of partial hysterectomy 30 years ago - fibroids    History of sinus surgery multiple sinus surgeries    History of tracheomalacia 2015 - attempted tracheal stenting ([facility])- course complicated by obstruction, respiratory failure, multiple CPR attempts -  stent discontinued; 10/20/2016 Tracheobronchoplasty (Prolene Mesh) performed at [facility] by Dr Zapien    Rectal bleeding exam under anesthesia ([facility]) 2/2018    S/P aortic valve replacement     S/P bronchoscopy 6/5/2018 - [facility] (Dr Zapien) no evidence of tracheobronchomalacia in trachea or bronchial tubes    S/P TAVR (transcatheter aortic valve replacement)

## 2024-12-13 NOTE — H&P ADULT - NSHPPHYSICALEXAM_GEN_ALL_CORE
VITALS:   T(C): 36.6 (12-13-24 @ 01:31), Max: 36.8 (12-12-24 @ 12:42)  HR: 61 (12-13-24 @ 01:31) (50 - 77)  BP: 140/60 (12-13-24 @ 01:31) (117/73 - 145/73)  RR: 20 (12-13-24 @ 01:31) (20 - 24)  SpO2: 100% (12-13-24 @ 01:31) (98% - 100%)    GENERAL: NAD, lying in bed comfortably  HEAD:  Atraumatic, Normocephalic  EYES: EOMI, PERRLA, conjunctiva and sclera clear  ENT: Moist mucous membranes  NECK: Supple, No JVD  CHEST/LUNG: + right sided crackles worse at base, No rhonchi, wheezing, or rubs. Coughing with deep breathing  HEART: Regular rate and rhythm; No murmurs, rubs, or gallops  ABDOMEN: BSx4; Soft, nontender, nondistended  EXTREMITIES:  2+ Peripheral Pulses, brisk capillary refill. No clubbing, cyanosis, or edema  NERVOUS SYSTEM:  A&Ox3, no focal deficits   SKIN: No rashes or lesions

## 2024-12-13 NOTE — H&P ADULT - NSHPREVIEWOFSYSTEMS_GEN_ALL_CORE
Constitutional: [ ] fevers [ ] chills [ ] fatigue  HEENT: [ ] postnasal drip [ ] nasal congestion  CV: [ ] chest pain [ ] orthopnea [ ] LE edema  Resp: [x ] cough [x ] shortness of breath [ ] dyspnea   GI: [ ] nausea [ ] vomiting [ ] diarrhea [ ] constipation [ ] abd pain [ ] melena  : [ ] dysuria [ ] increased urinary frequency  Musculoskeletal: [ ] back pain [ ] myalgias [ ] arthralgias [ ] fracture  Skin: [ ] rash [ ] itch  Neurological: [ ] headache [ ] dizziness [ ] syncope  Endocrine: [ ] diabetes [ ] thyroid problem  Hematologic/Lymphatic: [ ] anemia [ ] bleeding problem

## 2024-12-13 NOTE — PROGRESS NOTE ADULT - ASSESSMENT
74F hx type a aortic dissection s/p repair, aortic stenosis s/p TAVR in 2023, asthma on chronic steroids, bronchiectasis, pneumonia, tracheomalacia s/p tracheoplasty, T2DM, A-fib on Eliquis, colorectal cancer status post colostomy, seizures, HTN, HLD, presenting for shortness of breath, worsening cough. and green sputum for the past 2 weeks concerning for PNA.  74F hx type a aortic dissection s/p repair, aortic stenosis s/p TAVR in 2023, asthma on chronic steroids, bronchiectasis, pneumonia, tracheomalacia s/p tracheoplasty, T2DM, A-fib on Eliquis, colorectal cancer status post colostomy, seizures, HTN, HLD, presenting for shortness of breath, worsening cough. and green sputum for the past 2 weeks concerning for PNA. CXR showing bibasilar opacities with mild pleural effusion. Started on Ertapenem 1g daily empirically with premedication with Benadryl (12/12 - )  Pulm consulted given recurrent PNA and extensive pulm hx.  74F hx type a aortic dissection s/p repair, aortic stenosis s/p TAVR in 2023, asthma on chronic steroids, bronchiectasis, pneumonia, tracheomalacia s/p tracheoplasty, T2DM, A-fib on Eliquis, colorectal cancer status post colostomy, seizures, HTN, HLD, presenting for shortness of breath, worsening cough. and green sputum for the past 2 weeks concerning for PNA. CXR showing bibasilar opacities with mild pleural effusion. Started on Ertapenem 1g daily empirically with premedication with Benadryl (12/12 - ) and IV steroid.  Discussed with Dr. Allison, pt's pulmonologist, and recommends outpatient follow up.

## 2024-12-13 NOTE — PATIENT PROFILE ADULT - DO YOU FEEL THREATENED BY OTHERS?
Referral placed for ADHD testing   Continue medications as prescribed   Keep therapy appointments   
no

## 2024-12-13 NOTE — DISCHARGE NOTE PROVIDER - ATTENDING DISCHARGE PHYSICAL EXAMINATION:
GENERAL APPEARANCE: Well developed, NAD  HEENT: PERRL, EOMI. hearing grossly intact.  NECK: Neck supple, non-tender no lymphadenopathy, masses or thyromegaly.  CARDIAC: Normal S1 and S2. no mrg. RRR  LUNGS: Scattered rhonchi bilaterally. Occasional productive cough.  ABDOMEN: Soft , NTND, bowel sounds normal. No guarding or rebound.   MUSCULOSKELETAL: ROM intact.  No joint erythema or tenderness.   EXTREMITIES: Tender to palpation bilateral legs, No edema. Peripheral pulses intact.   NEUROLOGICAL: Non focal. Strength and sensation symmetric and intact throughout.   SKIN: Warm and dry , Well perfused  PSYCHIATRIC: AOx3 , Normal mood and affect

## 2024-12-13 NOTE — DISCHARGE NOTE PROVIDER - NSDCFUADDAPPT_GEN_ALL_CORE_FT
APPTS ARE READY TO BE MADE: [X] YES    Best Family or Patient Contact (if needed): Self    Additional Information about above appointments (if needed):    1: PCP  2: [facility] - Dr. Allison    Other comments or requests:    APPTS ARE READY TO BE MADE: [X] YES    Best Family or Patient Contact (if needed): Self    Additional Information about above appointments (if needed):    1: PCP  2: Pulm - Dr. Allison    Other comments or requests:     Prior to outreaching the patient, it was visible that the patient has secured a follow up appointment which was not scheduled by our team. Patient is scheduled to see Dr. Allison on 12/27 at [address] [place] [address]    Patient advised they did not want to proceed with scheduling appointments with the providers on their referrals. The appointment was previously being coordinated by their visiting nurse.

## 2024-12-13 NOTE — CONSULT NOTE ADULT - ASSESSMENT
74F PMH Type A aortic dissection s/p repair, AoS s/p TAVR, asthma on chronic steroids, ?COPD ?bronchiectasis, tracheomalacia s/p tracheoplasty, T2DM, pAF on Eliquis, seizures, HTN, HLD and colorectal cancer s/p colostomy p/w three weeks of dyspnea and cough productive of greenish sputum. Saw Pulmonologist (Dr Allison) several days ago, with no relief from symptoms. Admitted to medicine for possible ADHF vs bronchitis.  - No leukocytosis, afebrile in hospital, but per patient had subjective fever at home and cough productive of greenish sputum. Agree with empiric antibiotics. Patient is apparently allergic to most antibiotics other than ertapenem, but still wants pre-medication with Benadryl prior to administration.  - Not in respiratory distress at present, spO2 96% [RA] HR 67. No overt wheezing at present. Would continue all home inhalers.  - Patient wishes to have midline placed and be administered ertapenem as O/P. No reason for patient to stay in the hospital from a pulmonary perspective; can F/U with Dr Allison as O/P after D/C.

## 2024-12-13 NOTE — PATIENT PROFILE ADULT - TRANSPORTATION
no Myalgia Monitoring: I explained this is common when taking isotretinoin. If this worsens they will contact us.

## 2024-12-14 LAB
ALBUMIN SERPL ELPH-MCNC: 3.2 G/DL — LOW (ref 3.3–5)
ALP SERPL-CCNC: 93 U/L — SIGNIFICANT CHANGE UP (ref 40–120)
ALT FLD-CCNC: 28 U/L — SIGNIFICANT CHANGE UP (ref 10–45)
ANION GAP SERPL CALC-SCNC: 12 MMOL/L — SIGNIFICANT CHANGE UP (ref 5–17)
AST SERPL-CCNC: 27 U/L — SIGNIFICANT CHANGE UP (ref 10–40)
BASOPHILS # BLD AUTO: 0.02 K/UL — SIGNIFICANT CHANGE UP (ref 0–0.2)
BASOPHILS NFR BLD AUTO: 0.2 % — SIGNIFICANT CHANGE UP (ref 0–2)
BILIRUB SERPL-MCNC: 0.2 MG/DL — SIGNIFICANT CHANGE UP (ref 0.2–1.2)
BUN SERPL-MCNC: 9 MG/DL — SIGNIFICANT CHANGE UP (ref 7–23)
CALCIUM SERPL-MCNC: 8.8 MG/DL — SIGNIFICANT CHANGE UP (ref 8.4–10.5)
CHLORIDE SERPL-SCNC: 107 MMOL/L — SIGNIFICANT CHANGE UP (ref 96–108)
CO2 SERPL-SCNC: 24 MMOL/L — SIGNIFICANT CHANGE UP (ref 22–31)
CREAT SERPL-MCNC: 0.6 MG/DL — SIGNIFICANT CHANGE UP (ref 0.5–1.3)
EGFR: 93 ML/MIN/1.73M2 — SIGNIFICANT CHANGE UP
EOSINOPHIL # BLD AUTO: 0.08 K/UL — SIGNIFICANT CHANGE UP (ref 0–0.5)
EOSINOPHIL NFR BLD AUTO: 1 % — SIGNIFICANT CHANGE UP (ref 0–6)
GLUCOSE BLDC GLUCOMTR-MCNC: 109 MG/DL — HIGH (ref 70–99)
GLUCOSE BLDC GLUCOMTR-MCNC: 171 MG/DL — HIGH (ref 70–99)
GLUCOSE BLDC GLUCOMTR-MCNC: 292 MG/DL — HIGH (ref 70–99)
GLUCOSE BLDC GLUCOMTR-MCNC: 86 MG/DL — SIGNIFICANT CHANGE UP (ref 70–99)
GLUCOSE SERPL-MCNC: 68 MG/DL — LOW (ref 70–99)
HCT VFR BLD CALC: 35.2 % — SIGNIFICANT CHANGE UP (ref 34.5–45)
HGB BLD-MCNC: 10.5 G/DL — LOW (ref 11.5–15.5)
IMM GRANULOCYTES NFR BLD AUTO: 0.5 % — SIGNIFICANT CHANGE UP (ref 0–0.9)
LACTATE SERPL-SCNC: 0.7 MMOL/L — SIGNIFICANT CHANGE UP (ref 0.5–2)
LYMPHOCYTES # BLD AUTO: 2.23 K/UL — SIGNIFICANT CHANGE UP (ref 1–3.3)
LYMPHOCYTES # BLD AUTO: 27.6 % — SIGNIFICANT CHANGE UP (ref 13–44)
MAGNESIUM SERPL-MCNC: 2.4 MG/DL — SIGNIFICANT CHANGE UP (ref 1.6–2.6)
MCHC RBC-ENTMCNC: 22.8 PG — LOW (ref 27–34)
MCHC RBC-ENTMCNC: 29.8 G/DL — LOW (ref 32–36)
MCV RBC AUTO: 76.4 FL — LOW (ref 80–100)
MONOCYTES # BLD AUTO: 1.11 K/UL — HIGH (ref 0–0.9)
MONOCYTES NFR BLD AUTO: 13.7 % — SIGNIFICANT CHANGE UP (ref 2–14)
NEUTROPHILS # BLD AUTO: 4.61 K/UL — SIGNIFICANT CHANGE UP (ref 1.8–7.4)
NEUTROPHILS NFR BLD AUTO: 57 % — SIGNIFICANT CHANGE UP (ref 43–77)
NRBC # BLD: 0 /100 WBCS — SIGNIFICANT CHANGE UP (ref 0–0)
PHOSPHATE SERPL-MCNC: 2.2 MG/DL — LOW (ref 2.5–4.5)
PLATELET # BLD AUTO: 377 K/UL — SIGNIFICANT CHANGE UP (ref 150–400)
POTASSIUM SERPL-MCNC: 4.2 MMOL/L — SIGNIFICANT CHANGE UP (ref 3.5–5.3)
POTASSIUM SERPL-SCNC: 4.2 MMOL/L — SIGNIFICANT CHANGE UP (ref 3.5–5.3)
PROT SERPL-MCNC: 6 G/DL — SIGNIFICANT CHANGE UP (ref 6–8.3)
RBC # BLD: 4.61 M/UL — SIGNIFICANT CHANGE UP (ref 3.8–5.2)
RBC # FLD: 20.5 % — HIGH (ref 10.3–14.5)
SODIUM SERPL-SCNC: 143 MMOL/L — SIGNIFICANT CHANGE UP (ref 135–145)
WBC # BLD: 8.09 K/UL — SIGNIFICANT CHANGE UP (ref 3.8–10.5)
WBC # FLD AUTO: 8.09 K/UL — SIGNIFICANT CHANGE UP (ref 3.8–10.5)

## 2024-12-14 PROCEDURE — 71045 X-RAY EXAM CHEST 1 VIEW: CPT | Mod: 26

## 2024-12-14 PROCEDURE — 99232 SBSQ HOSP IP/OBS MODERATE 35: CPT | Mod: GC

## 2024-12-14 RX ORDER — SIMETHICONE 125 MG
80 CAPSULE ORAL DAILY
Refills: 0 | Status: DISCONTINUED | OUTPATIENT
Start: 2024-12-14 | End: 2024-12-17

## 2024-12-14 RX ORDER — CHLORHEXIDINE GLUCONATE 1.2 MG/ML
1 RINSE ORAL DAILY
Refills: 0 | Status: DISCONTINUED | OUTPATIENT
Start: 2024-12-14 | End: 2024-12-17

## 2024-12-14 RX ADMIN — LEVETIRACETAM 1500 MILLIGRAM(S): 1000 TABLET ORAL at 20:34

## 2024-12-14 RX ADMIN — Medication 2 TABLET(S): at 20:47

## 2024-12-14 RX ADMIN — Medication 3: at 12:45

## 2024-12-14 RX ADMIN — Medication 30 MILLILITER(S): at 06:30

## 2024-12-14 RX ADMIN — LABETALOL 100 MILLIGRAM(S): 100 TABLET, FILM COATED ORAL at 13:09

## 2024-12-14 RX ADMIN — Medication 200 MILLIGRAM(S): at 20:34

## 2024-12-14 RX ADMIN — FLUTICASONE PROPIONATE AND SALMETEROL XINAFOATE 1 DOSE(S): 45; 21 AEROSOL, METERED RESPIRATORY (INHALATION) at 06:09

## 2024-12-14 RX ADMIN — Medication 200 MILLIGRAM(S): at 06:04

## 2024-12-14 RX ADMIN — Medication 5 MILLIGRAM(S): at 13:09

## 2024-12-14 RX ADMIN — LACOSAMIDE 100 MILLIGRAM(S): 10 INJECTION INTRAVENOUS at 06:05

## 2024-12-14 RX ADMIN — Medication 2 PUFF(S): at 11:46

## 2024-12-14 RX ADMIN — Medication 80 MILLIGRAM(S): at 21:56

## 2024-12-14 RX ADMIN — SERTRALINE HYDROCHLORIDE 50 MILLIGRAM(S): 100 TABLET, FILM COATED ORAL at 11:44

## 2024-12-14 RX ADMIN — ERTAPENEM 120 MILLIGRAM(S): 1 INJECTION, POWDER, LYOPHILIZED, FOR SOLUTION INTRAMUSCULAR; INTRAVENOUS at 04:09

## 2024-12-14 RX ADMIN — Medication 8 MILLIGRAM(S): at 20:34

## 2024-12-14 RX ADMIN — ROSUVASTATIN CALCIUM 5 MILLIGRAM(S): 5 TABLET, FILM COATED ORAL at 20:47

## 2024-12-14 RX ADMIN — Medication 8 MILLIGRAM(S): at 06:04

## 2024-12-14 RX ADMIN — Medication 30 MILLILITER(S): at 00:08

## 2024-12-14 RX ADMIN — LABETALOL 100 MILLIGRAM(S): 100 TABLET, FILM COATED ORAL at 20:46

## 2024-12-14 RX ADMIN — INSULIN GLARGINE 24 UNIT(S): 100 INJECTION, SOLUTION SUBCUTANEOUS at 09:37

## 2024-12-14 RX ADMIN — CLOPIDOGREL 75 MILLIGRAM(S): 75 TABLET, FILM COATED ORAL at 11:44

## 2024-12-14 RX ADMIN — LACOSAMIDE 100 MILLIGRAM(S): 10 INJECTION INTRAVENOUS at 20:34

## 2024-12-14 RX ADMIN — LEVETIRACETAM 1500 MILLIGRAM(S): 1000 TABLET ORAL at 06:04

## 2024-12-14 RX ADMIN — SODIUM CHLORIDE 4 MILLILITER(S): 9 INJECTION, SOLUTION INTRAMUSCULAR; INTRAVENOUS; SUBCUTANEOUS at 06:04

## 2024-12-14 RX ADMIN — FLUTICASONE PROPIONATE AND SALMETEROL XINAFOATE 1 DOSE(S): 45; 21 AEROSOL, METERED RESPIRATORY (INHALATION) at 17:35

## 2024-12-14 RX ADMIN — Medication 30 MILLILITER(S): at 15:10

## 2024-12-14 RX ADMIN — AZITHROMYCIN 255 MILLIGRAM(S): 250 TABLET, FILM COATED ORAL at 11:47

## 2024-12-14 RX ADMIN — CHLORHEXIDINE GLUCONATE 1 APPLICATION(S): 1.2 RINSE ORAL at 13:06

## 2024-12-14 RX ADMIN — PANTOPRAZOLE SODIUM 40 MILLIGRAM(S): 40 TABLET, DELAYED RELEASE ORAL at 06:05

## 2024-12-14 RX ADMIN — MONTELUKAST SODIUM 10 MILLIGRAM(S): 10 TABLET ORAL at 11:44

## 2024-12-14 RX ADMIN — SODIUM CHLORIDE 4 MILLILITER(S): 9 INJECTION, SOLUTION INTRAMUSCULAR; INTRAVENOUS; SUBCUTANEOUS at 20:35

## 2024-12-14 RX ADMIN — INSULIN GLARGINE 14 UNIT(S): 100 INJECTION, SOLUTION SUBCUTANEOUS at 22:25

## 2024-12-14 RX ADMIN — APIXABAN 5 MILLIGRAM(S): 2.5 TABLET, FILM COATED ORAL at 06:04

## 2024-12-14 RX ADMIN — APIXABAN 5 MILLIGRAM(S): 2.5 TABLET, FILM COATED ORAL at 20:35

## 2024-12-14 RX ADMIN — Medication 1 TABLET(S): at 11:44

## 2024-12-14 RX ADMIN — Medication 1: at 17:31

## 2024-12-14 RX ADMIN — Medication 25 MILLIGRAM(S): at 03:41

## 2024-12-14 NOTE — PROGRESS NOTE ADULT - PROBLEM SELECTOR PLAN 7
DVT PPx: eliquis  Diet: DASH  BM regimen: Senna  Dispo: Home  PCP: Arvind Crockett  Pharmacy: [address]

## 2024-12-14 NOTE — PROGRESS NOTE ADULT - SUBJECTIVE AND OBJECTIVE BOX
PROGRESS NOTE:   Authored by Dr. Kendra Nguyen MD (PGY-1).  via TEAMS    Patient is a 74y old  Female who presents with a chief complaint of     SUBJECTIVE / OVERNIGHT EVENTS:  No acute events overnight.     ADDITIONAL REVIEW OF SYSTEMS:  Patient denies fevers, chills, chest pain, shortness of breath, nausea, abdominal pain, diarrhea, constipation, dysuria, leg swelling, headache, light headedness.    MEDICATIONS  (STANDING):  apixaban 5 milliGRAM(s) Oral every 12 hours  azithromycin  IVPB 500 milliGRAM(s) IV Intermittent every 24 hours  clopidogrel Tablet 75 milliGRAM(s) Oral daily  dextrose 5%. 1000 milliLiter(s) (100 mL/Hr) IV Continuous <Continuous>  dextrose 5%. 1000 milliLiter(s) (50 mL/Hr) IV Continuous <Continuous>  dextrose 50% Injectable 25 Gram(s) IV Push once  dextrose 50% Injectable 12.5 Gram(s) IV Push once  dextrose 50% Injectable 25 Gram(s) IV Push once  dextrose Oral Gel 15 Gram(s) Oral once  ertapenem  IVPB 1000 milliGRAM(s) IV Intermittent every 24 hours  fluticasone propionate/ salmeterol 250-50 MICROgram(s) Diskus 1 Dose(s) Inhalation two times a day  glucagon  Injectable 1 milliGRAM(s) IntraMuscular once  insulin glargine Injectable (LANTUS) 24 Unit(s) SubCutaneous before breakfast  insulin glargine Injectable (LANTUS) 14 Unit(s) SubCutaneous at bedtime  insulin lispro (ADMELOG) corrective regimen sliding scale   SubCutaneous three times a day before meals  insulin lispro (ADMELOG) corrective regimen sliding scale   SubCutaneous at bedtime  labetalol 100 milliGRAM(s) Oral every 8 hours  lacosamide 100 milliGRAM(s) Oral two times a day  levETIRAcetam 1500 milliGRAM(s) Oral two times a day  methylPREDNISolone 8 milliGRAM(s) Oral two times a day  mexiletine 200 milliGRAM(s) Oral two times a day  montelukast 10 milliGRAM(s) Oral daily  multivitamin 1 Tablet(s) Oral daily  NIFEdipine XL 30 milliGRAM(s) Oral daily  pantoprazole    Tablet 40 milliGRAM(s) Oral before breakfast  rosuvastatin 5 milliGRAM(s) Oral at bedtime  senna 2 Tablet(s) Oral at bedtime  sertraline 50 milliGRAM(s) Oral daily  sodium chloride 7% Inhalation 4 milliLiter(s) Inhalation every 12 hours  tiotropium 2.5 MICROgram(s) Inhaler 2 Puff(s) Inhalation daily    MEDICATIONS  (PRN):  albuterol    90 MICROgram(s) HFA Inhaler 2 Puff(s) Inhalation every 6 hours PRN for shortness of breath and/or wheezing  aluminum hydroxide/magnesium hydroxide/simethicone Suspension 30 milliLiter(s) Oral every 6 hours PRN Dyspepsia  diphenhydrAMINE Injectable 25 milliGRAM(s) IV Push daily PRN For IV Ertapenem      CAPILLARY BLOOD GLUCOSE      POCT Blood Glucose.: 107 mg/dL (13 Dec 2024 22:18)  POCT Blood Glucose.: 144 mg/dL (13 Dec 2024 17:43)  POCT Blood Glucose.: 336 mg/dL (13 Dec 2024 12:48)  POCT Blood Glucose.: 407 mg/dL (13 Dec 2024 12:47)  POCT Blood Glucose.: 110 mg/dL (13 Dec 2024 08:47)    I&O's Summary    13 Dec 2024 07:01  -  14 Dec 2024 07:00  --------------------------------------------------------  IN: 0 mL / OUT: 1550 mL / NET: -1550 mL        PHYSICAL EXAM:  Vital Signs Last 24 Hrs  T(C): 36.7 (14 Dec 2024 04:16), Max: 36.8 (13 Dec 2024 12:49)  T(F): 98.1 (14 Dec 2024 04:16), Max: 98.3 (13 Dec 2024 12:49)  HR: 61 (14 Dec 2024 04:16) (61 - 69)  BP: 143/57 (14 Dec 2024 06:00) (127/75 - 152/75)  BP(mean): --  RR: 18 (14 Dec 2024 04:16) (18 - 18)  SpO2: 93% (14 Dec 2024 04:16) (93% - 100%)    Parameters below as of 14 Dec 2024 04:16  Patient On (Oxygen Delivery Method): room air        CONSTITUTIONAL: NAD, RA  HEENT: Atraumatic, Normocephalic, EOMI, PERRL, Conjunctiva and sclera clear, moist mucous membranes  Neck: Supple, no elevated JVP.  RESPIRATORY: Normal respiratory effort; lungs are clear to auscultation bilaterally  CARDIOVASCULAR: Regular rate and rhythm, normal S1 and S2, no murmur/rub/gallop  ABDOMEN: Nontender to palpation, normoactive bowel sounds, no rebound/guarding; No hepatosplenomegaly  MSK: no clubbing or cyanosis of digits; no joint swelling or tenderness to palpation; no lower extremity edema; Peripheral pulses are 2+ bilaterally  NEURO: AOx3, no focal deficits  SKIN: No rashes or lesions    LABS:                        10.5   8.09  )-----------( 377      ( 14 Dec 2024 06:26 )             35.2     12-14    143  |  107  |  9   ----------------------------<  68[L]  4.2   |  24  |  0.60    Ca    8.8      14 Dec 2024 06:26  Phos  2.2     12-14  Mg     2.4     12-14    TPro  6.0  /  Alb  3.2[L]  /  TBili  0.2  /  DBili  x   /  AST  27  /  ALT  28  /  AlkPhos  93  12-14    PT/INR - ( 12 Dec 2024 15:36 )   PT: 14.5 sec;   INR: 1.27 ratio         PTT - ( 12 Dec 2024 15:36 )  PTT:38.5 sec      Urinalysis Basic - ( 14 Dec 2024 06:26 )    Color: x / Appearance: x / SG: x / pH: x  Gluc: 68 mg/dL / Ketone: x  / Bili: x / Urobili: x   Blood: x / Protein: x / Nitrite: x   Leuk Esterase: x / RBC: x / WBC x   Sq Epi: x / Non Sq Epi: x / Bacteria: x        Culture - Blood (collected 12 Dec 2024 15:25)  Source: .Blood BLOOD  Preliminary Report (13 Dec 2024 19:02):    No growth at 24 hours    Culture - Blood (collected 12 Dec 2024 15:10)  Source: .Blood BLOOD  Preliminary Report (13 Dec 2024 19:02):    No growth at 24 hours        Tele Reviewed:    RADIOLOGY & ADDITIONAL TESTS:  Results Reviewed:   Imaging Personally Reviewed:  Electrocardiogram Personally Reviewed:     PROGRESS NOTE:   Authored by Dr. Kendra Nguyen MD (PGY-1).  via TEAMS    Patient is a 74y old  Female who presents with a chief complaint of     SUBJECTIVE / OVERNIGHT EVENTS:  No acute events overnight. Denies SOB or CP. BM yesterday.     ADDITIONAL REVIEW OF SYSTEMS:  Patient denies fevers, chills, chest pain, shortness of breath, nausea, abdominal pain, diarrhea, constipation, dysuria, leg swelling, headache, light headedness.    MEDICATIONS  (STANDING):  apixaban 5 milliGRAM(s) Oral every 12 hours  azithromycin  IVPB 500 milliGRAM(s) IV Intermittent every 24 hours  clopidogrel Tablet 75 milliGRAM(s) Oral daily  dextrose 5%. 1000 milliLiter(s) (100 mL/Hr) IV Continuous <Continuous>  dextrose 5%. 1000 milliLiter(s) (50 mL/Hr) IV Continuous <Continuous>  dextrose 50% Injectable 25 Gram(s) IV Push once  dextrose 50% Injectable 12.5 Gram(s) IV Push once  dextrose 50% Injectable 25 Gram(s) IV Push once  dextrose Oral Gel 15 Gram(s) Oral once  ertapenem  IVPB 1000 milliGRAM(s) IV Intermittent every 24 hours  fluticasone propionate/ salmeterol 250-50 MICROgram(s) Diskus 1 Dose(s) Inhalation two times a day  glucagon  Injectable 1 milliGRAM(s) IntraMuscular once  insulin glargine Injectable (LANTUS) 24 Unit(s) SubCutaneous before breakfast  insulin glargine Injectable (LANTUS) 14 Unit(s) SubCutaneous at bedtime  insulin lispro (ADMELOG) corrective regimen sliding scale   SubCutaneous three times a day before meals  insulin lispro (ADMELOG) corrective regimen sliding scale   SubCutaneous at bedtime  labetalol 100 milliGRAM(s) Oral every 8 hours  lacosamide 100 milliGRAM(s) Oral two times a day  levETIRAcetam 1500 milliGRAM(s) Oral two times a day  methylPREDNISolone 8 milliGRAM(s) Oral two times a day  mexiletine 200 milliGRAM(s) Oral two times a day  montelukast 10 milliGRAM(s) Oral daily  multivitamin 1 Tablet(s) Oral daily  NIFEdipine XL 30 milliGRAM(s) Oral daily  pantoprazole    Tablet 40 milliGRAM(s) Oral before breakfast  rosuvastatin 5 milliGRAM(s) Oral at bedtime  senna 2 Tablet(s) Oral at bedtime  sertraline 50 milliGRAM(s) Oral daily  sodium chloride 7% Inhalation 4 milliLiter(s) Inhalation every 12 hours  tiotropium 2.5 MICROgram(s) Inhaler 2 Puff(s) Inhalation daily    MEDICATIONS  (PRN):  albuterol    90 MICROgram(s) HFA Inhaler 2 Puff(s) Inhalation every 6 hours PRN for shortness of breath and/or wheezing  aluminum hydroxide/magnesium hydroxide/simethicone Suspension 30 milliLiter(s) Oral every 6 hours PRN Dyspepsia  diphenhydrAMINE Injectable 25 milliGRAM(s) IV Push daily PRN For IV Ertapenem      CAPILLARY BLOOD GLUCOSE      POCT Blood Glucose.: 107 mg/dL (13 Dec 2024 22:18)  POCT Blood Glucose.: 144 mg/dL (13 Dec 2024 17:43)  POCT Blood Glucose.: 336 mg/dL (13 Dec 2024 12:48)  POCT Blood Glucose.: 407 mg/dL (13 Dec 2024 12:47)  POCT Blood Glucose.: 110 mg/dL (13 Dec 2024 08:47)    I&O's Summary    13 Dec 2024 07:01  -  14 Dec 2024 07:00  --------------------------------------------------------  IN: 0 mL / OUT: 1550 mL / NET: -1550 mL        PHYSICAL EXAM:  Vital Signs Last 24 Hrs  T(C): 36.7 (14 Dec 2024 04:16), Max: 36.8 (13 Dec 2024 12:49)  T(F): 98.1 (14 Dec 2024 04:16), Max: 98.3 (13 Dec 2024 12:49)  HR: 61 (14 Dec 2024 04:16) (61 - 69)  BP: 143/57 (14 Dec 2024 06:00) (127/75 - 152/75)  BP(mean): --  RR: 18 (14 Dec 2024 04:16) (18 - 18)  SpO2: 93% (14 Dec 2024 04:16) (93% - 100%)    Parameters below as of 14 Dec 2024 04:16  Patient On (Oxygen Delivery Method): room air        CONSTITUTIONAL: NAD, on RA  HEENT: Atraumatic, Normocephalic, EOMI, PERRL, Conjunctiva and sclera clear, moist mucous membranes  Neck: Supple, no elevated JVP.  RESPIRATORY: Normal respiratory effort; right lobes with rhonchi  CARDIOVASCULAR: Regular rate and rhythm, normal S1 and S2, no murmur/rub/gallop  ABDOMEN: Nontender to palpation, normoactive bowel sounds, no rebound/guarding; No hepatosplenomegaly  MSK: no clubbing or cyanosis of digits; no joint swelling or tenderness to palpation; no lower extremity edema; Peripheral pulses are 2+ bilaterally  NEURO: AOx3, no focal deficits  SKIN: No rashes or lesions    LABS:                        10.5   8.09  )-----------( 377      ( 14 Dec 2024 06:26 )             35.2     12-14    143  |  107  |  9   ----------------------------<  68[L]  4.2   |  24  |  0.60    Ca    8.8      14 Dec 2024 06:26  Phos  2.2     12-14  Mg     2.4     12-14    TPro  6.0  /  Alb  3.2[L]  /  TBili  0.2  /  DBili  x   /  AST  27  /  ALT  28  /  AlkPhos  93  12-14    PT/INR - ( 12 Dec 2024 15:36 )   PT: 14.5 sec;   INR: 1.27 ratio         PTT - ( 12 Dec 2024 15:36 )  PTT:38.5 sec      Urinalysis Basic - ( 14 Dec 2024 06:26 )    Color: x / Appearance: x / SG: x / pH: x  Gluc: 68 mg/dL / Ketone: x  / Bili: x / Urobili: x   Blood: x / Protein: x / Nitrite: x   Leuk Esterase: x / RBC: x / WBC x   Sq Epi: x / Non Sq Epi: x / Bacteria: x        Culture - Blood (collected 12 Dec 2024 15:25)  Source: .Blood BLOOD  Preliminary Report (13 Dec 2024 19:02):    No growth at 24 hours    Culture - Blood (collected 12 Dec 2024 15:10)  Source: .Blood BLOOD  Preliminary Report (13 Dec 2024 19:02):    No growth at 24 hours        Tele Reviewed:    RADIOLOGY & ADDITIONAL TESTS:  Results Reviewed:   Imaging Personally Reviewed:  Electrocardiogram Personally Reviewed:

## 2024-12-14 NOTE — PROGRESS NOTE ADULT - PROBLEM SELECTOR PLAN 1
Presenting with cough, green sputum production, SOB, subjective fevers  CXR showing bibasilar opacities with mild pleural effusion  Hx of recurrent PNA, most recent admission 11/5-11-9/2024   Allergies to penicillin, cefepime, meropenem  Previously tolerated ertapenem with benadryl prior   - UA neg; MRSA PCR, Urine legionella, full RVP neg  - blood culture NGTD 24hr  - trop 42    Plan:  - On Ertapenem 1g daily empirically with premedication with Benadryl (12/13 - )  - On Azithromycin for atypical coverage (12/13- )  - Pulm consulted given recurrent PNA and extensive pulm hx.   - Chest PT/chest vest, airway clearance  - pending sputum culture Presenting with cough, green sputum production, SOB, subjective fevers; Hx of recurrent PNA, most recent admission 11/5-11-9/2024   - CXR showing bibasilar opacities with mild pleural effusion  - Empiric abx with Ertapenem with benadryl prior; previously     - Allergies to penicillin, cefepime, meropenem  - UA neg; MRSA PCR, Urine legionella, full RVP neg  - blood culture NGTD 24hr  - trop 42  - WBC 11.93 ->8.09 on abx    Plan:  - On Ertapenem 1g daily empirically with premedication with Benadryl (12/13 - )  - On Azithromycin for atypical coverage (12/13- )  - Discussed with Dr. Allison, pt's pulmonologist, defers to abx treatment to primary team and recommends outpatient follow up.  - Chest PT/chest vest, airway clearance  - pending sputum culture

## 2024-12-14 NOTE — PROGRESS NOTE ADULT - ASSESSMENT
74F hx type a aortic dissection s/p repair, aortic stenosis s/p TAVR in 2023, asthma on chronic steroids, bronchiectasis, pneumonia, tracheomalacia s/p tracheoplasty, T2DM, A-fib on Eliquis, colorectal cancer status post colostomy, seizures, HTN, HLD, presenting for shortness of breath, worsening cough. and green sputum for the past 2 weeks concerning for PNA. CXR showing bibasilar opacities with mild pleural effusion. Started on Ertapenem 1g daily and Azithromycin 500mg  qD empirically with premedication with Benadryl (12/13 -12/17) and continued home med PO methylprednisolone 8mg BID.     Discussed with Dr. Allison, pt's pulmonologist, defers to abx treatment to primary team and recommends outpatient follow up.  74F hx type a aortic dissection s/p repair, aortic stenosis s/p TAVR in 2023, asthma on chronic steroids, bronchiectasis, pneumonia, tracheomalacia s/p tracheoplasty, T2DM, A-fib on Eliquis, colorectal cancer status post colostomy, seizures, HTN, HLD, presenting for shortness of breath, worsening cough. and green sputum for the past 2 weeks concerning for PNA. CXR showing bibasilar opacities with mild pleural effusion. Started on Ertapenem 1g daily and Azithromycin 500mg  qD empirically with premedication with Benadryl (12/13 -12/17) and continued home med PO methylprednisolone 8 mg BID.     Discussed with Dr. Allison, pt's pulmonologist, defers to abx treatment to primary team and recommends outpatient follow up.

## 2024-12-15 LAB
ALBUMIN SERPL ELPH-MCNC: 3.5 G/DL — SIGNIFICANT CHANGE UP (ref 3.3–5)
ALP SERPL-CCNC: 96 U/L — SIGNIFICANT CHANGE UP (ref 40–120)
ALT FLD-CCNC: 22 U/L — SIGNIFICANT CHANGE UP (ref 10–45)
ANION GAP SERPL CALC-SCNC: 12 MMOL/L — SIGNIFICANT CHANGE UP (ref 5–17)
AST SERPL-CCNC: 15 U/L — SIGNIFICANT CHANGE UP (ref 10–40)
BASOPHILS # BLD AUTO: 0.02 K/UL — SIGNIFICANT CHANGE UP (ref 0–0.2)
BASOPHILS NFR BLD AUTO: 0.2 % — SIGNIFICANT CHANGE UP (ref 0–2)
BILIRUB SERPL-MCNC: 0.2 MG/DL — SIGNIFICANT CHANGE UP (ref 0.2–1.2)
BUN SERPL-MCNC: 10 MG/DL — SIGNIFICANT CHANGE UP (ref 7–23)
CALCIUM SERPL-MCNC: 8.8 MG/DL — SIGNIFICANT CHANGE UP (ref 8.4–10.5)
CHLORIDE SERPL-SCNC: 104 MMOL/L — SIGNIFICANT CHANGE UP (ref 96–108)
CO2 SERPL-SCNC: 25 MMOL/L — SIGNIFICANT CHANGE UP (ref 22–31)
CREAT SERPL-MCNC: 0.62 MG/DL — SIGNIFICANT CHANGE UP (ref 0.5–1.3)
EGFR: 92 ML/MIN/1.73M2 — SIGNIFICANT CHANGE UP
EOSINOPHIL # BLD AUTO: 0.01 K/UL — SIGNIFICANT CHANGE UP (ref 0–0.5)
EOSINOPHIL NFR BLD AUTO: 0.1 % — SIGNIFICANT CHANGE UP (ref 0–6)
GLUCOSE BLDC GLUCOMTR-MCNC: 136 MG/DL — HIGH (ref 70–99)
GLUCOSE BLDC GLUCOMTR-MCNC: 140 MG/DL — HIGH (ref 70–99)
GLUCOSE BLDC GLUCOMTR-MCNC: 162 MG/DL — HIGH (ref 70–99)
GLUCOSE BLDC GLUCOMTR-MCNC: 195 MG/DL — HIGH (ref 70–99)
GLUCOSE SERPL-MCNC: 125 MG/DL — HIGH (ref 70–99)
GRAM STN FLD: ABNORMAL
HCT VFR BLD CALC: 37.9 % — SIGNIFICANT CHANGE UP (ref 34.5–45)
HGB BLD-MCNC: 11.3 G/DL — LOW (ref 11.5–15.5)
IMM GRANULOCYTES NFR BLD AUTO: 0.6 % — SIGNIFICANT CHANGE UP (ref 0–0.9)
LYMPHOCYTES # BLD AUTO: 1.62 K/UL — SIGNIFICANT CHANGE UP (ref 1–3.3)
LYMPHOCYTES # BLD AUTO: 13 % — SIGNIFICANT CHANGE UP (ref 13–44)
MAGNESIUM SERPL-MCNC: 2.3 MG/DL — SIGNIFICANT CHANGE UP (ref 1.6–2.6)
MCHC RBC-ENTMCNC: 22.8 PG — LOW (ref 27–34)
MCHC RBC-ENTMCNC: 29.8 G/DL — LOW (ref 32–36)
MCV RBC AUTO: 76.6 FL — LOW (ref 80–100)
MONOCYTES # BLD AUTO: 1.04 K/UL — HIGH (ref 0–0.9)
MONOCYTES NFR BLD AUTO: 8.3 % — SIGNIFICANT CHANGE UP (ref 2–14)
NEUTROPHILS # BLD AUTO: 9.73 K/UL — HIGH (ref 1.8–7.4)
NEUTROPHILS NFR BLD AUTO: 77.8 % — HIGH (ref 43–77)
NRBC # BLD: 0 /100 WBCS — SIGNIFICANT CHANGE UP (ref 0–0)
PHOSPHATE SERPL-MCNC: 2.2 MG/DL — LOW (ref 2.5–4.5)
PLATELET # BLD AUTO: 372 K/UL — SIGNIFICANT CHANGE UP (ref 150–400)
POTASSIUM SERPL-MCNC: 4.6 MMOL/L — SIGNIFICANT CHANGE UP (ref 3.5–5.3)
POTASSIUM SERPL-SCNC: 4.6 MMOL/L — SIGNIFICANT CHANGE UP (ref 3.5–5.3)
PROT SERPL-MCNC: 6.5 G/DL — SIGNIFICANT CHANGE UP (ref 6–8.3)
RBC # BLD: 4.95 M/UL — SIGNIFICANT CHANGE UP (ref 3.8–5.2)
RBC # FLD: 21.2 % — HIGH (ref 10.3–14.5)
SODIUM SERPL-SCNC: 141 MMOL/L — SIGNIFICANT CHANGE UP (ref 135–145)
SPECIMEN SOURCE: SIGNIFICANT CHANGE UP
WBC # BLD: 12.49 K/UL — HIGH (ref 3.8–10.5)
WBC # FLD AUTO: 12.49 K/UL — HIGH (ref 3.8–10.5)

## 2024-12-15 PROCEDURE — 99232 SBSQ HOSP IP/OBS MODERATE 35: CPT | Mod: GC

## 2024-12-15 RX ORDER — POLYETHYLENE GLYCOL 3350 17 G/17G
17 POWDER, FOR SOLUTION ORAL
Refills: 0 | Status: DISCONTINUED | OUTPATIENT
Start: 2024-12-15 | End: 2024-12-17

## 2024-12-15 RX ORDER — SODIUM,POTASSIUM PHOSPHATES 278-250MG
1 POWDER IN PACKET (EA) ORAL ONCE
Refills: 0 | Status: COMPLETED | OUTPATIENT
Start: 2024-12-15 | End: 2024-12-15

## 2024-12-15 RX ADMIN — FLUTICASONE PROPIONATE AND SALMETEROL XINAFOATE 1 DOSE(S): 45; 21 AEROSOL, METERED RESPIRATORY (INHALATION) at 17:59

## 2024-12-15 RX ADMIN — Medication 200 MILLIGRAM(S): at 17:58

## 2024-12-15 RX ADMIN — APIXABAN 5 MILLIGRAM(S): 2.5 TABLET, FILM COATED ORAL at 17:58

## 2024-12-15 RX ADMIN — MONTELUKAST SODIUM 10 MILLIGRAM(S): 10 TABLET ORAL at 11:51

## 2024-12-15 RX ADMIN — Medication 80 MILLIGRAM(S): at 11:50

## 2024-12-15 RX ADMIN — SODIUM CHLORIDE 4 MILLILITER(S): 9 INJECTION, SOLUTION INTRAMUSCULAR; INTRAVENOUS; SUBCUTANEOUS at 05:05

## 2024-12-15 RX ADMIN — ERTAPENEM 120 MILLIGRAM(S): 1 INJECTION, POWDER, LYOPHILIZED, FOR SOLUTION INTRAMUSCULAR; INTRAVENOUS at 05:09

## 2024-12-15 RX ADMIN — Medication 5 MILLIGRAM(S): at 11:51

## 2024-12-15 RX ADMIN — LABETALOL 100 MILLIGRAM(S): 100 TABLET, FILM COATED ORAL at 05:05

## 2024-12-15 RX ADMIN — Medication 200 MILLIGRAM(S): at 05:09

## 2024-12-15 RX ADMIN — SERTRALINE HYDROCHLORIDE 50 MILLIGRAM(S): 100 TABLET, FILM COATED ORAL at 11:51

## 2024-12-15 RX ADMIN — Medication 25 MILLIGRAM(S): at 22:41

## 2024-12-15 RX ADMIN — LABETALOL 100 MILLIGRAM(S): 100 TABLET, FILM COATED ORAL at 14:30

## 2024-12-15 RX ADMIN — APIXABAN 5 MILLIGRAM(S): 2.5 TABLET, FILM COATED ORAL at 05:09

## 2024-12-15 RX ADMIN — FLUTICASONE PROPIONATE AND SALMETEROL XINAFOATE 1 DOSE(S): 45; 21 AEROSOL, METERED RESPIRATORY (INHALATION) at 05:04

## 2024-12-15 RX ADMIN — INSULIN GLARGINE 24 UNIT(S): 100 INJECTION, SOLUTION SUBCUTANEOUS at 09:04

## 2024-12-15 RX ADMIN — LACOSAMIDE 100 MILLIGRAM(S): 10 INJECTION INTRAVENOUS at 05:05

## 2024-12-15 RX ADMIN — Medication 8 MILLIGRAM(S): at 17:58

## 2024-12-15 RX ADMIN — LACOSAMIDE 100 MILLIGRAM(S): 10 INJECTION INTRAVENOUS at 17:58

## 2024-12-15 RX ADMIN — Medication 2 PUFF(S): at 11:51

## 2024-12-15 RX ADMIN — Medication 1 TABLET(S): at 11:50

## 2024-12-15 RX ADMIN — LEVETIRACETAM 1500 MILLIGRAM(S): 1000 TABLET ORAL at 17:57

## 2024-12-15 RX ADMIN — Medication 1: at 09:05

## 2024-12-15 RX ADMIN — INSULIN GLARGINE 14 UNIT(S): 100 INJECTION, SOLUTION SUBCUTANEOUS at 21:55

## 2024-12-15 RX ADMIN — LEVETIRACETAM 1500 MILLIGRAM(S): 1000 TABLET ORAL at 05:08

## 2024-12-15 RX ADMIN — Medication 1 PACKET(S): at 11:50

## 2024-12-15 RX ADMIN — Medication 30 MILLIGRAM(S): at 05:08

## 2024-12-15 RX ADMIN — SODIUM CHLORIDE 4 MILLILITER(S): 9 INJECTION, SOLUTION INTRAMUSCULAR; INTRAVENOUS; SUBCUTANEOUS at 18:02

## 2024-12-15 RX ADMIN — Medication 2 TABLET(S): at 21:55

## 2024-12-15 RX ADMIN — ROSUVASTATIN CALCIUM 5 MILLIGRAM(S): 5 TABLET, FILM COATED ORAL at 21:55

## 2024-12-15 RX ADMIN — CLOPIDOGREL 75 MILLIGRAM(S): 75 TABLET, FILM COATED ORAL at 11:50

## 2024-12-15 RX ADMIN — LABETALOL 100 MILLIGRAM(S): 100 TABLET, FILM COATED ORAL at 21:55

## 2024-12-15 RX ADMIN — PANTOPRAZOLE SODIUM 40 MILLIGRAM(S): 40 TABLET, DELAYED RELEASE ORAL at 05:08

## 2024-12-15 RX ADMIN — Medication 1: at 18:05

## 2024-12-15 RX ADMIN — Medication 8 MILLIGRAM(S): at 05:05

## 2024-12-15 RX ADMIN — CHLORHEXIDINE GLUCONATE 1 APPLICATION(S): 1.2 RINSE ORAL at 11:51

## 2024-12-15 RX ADMIN — POLYETHYLENE GLYCOL 3350 17 GRAM(S): 17 POWDER, FOR SOLUTION ORAL at 17:59

## 2024-12-15 NOTE — PROGRESS NOTE ADULT - SUBJECTIVE AND OBJECTIVE BOX
PROGRESS NOTE:   Authored by Davin Sanchez MD  Internal Medicine      Patient is a 74y old  Female who presents with a chief complaint of     SUBJECTIVE / OVERNIGHT EVENTS: CHRISTIANEEO, patient seen and examined at bedside    MEDICATIONS  (STANDING):  apixaban 5 milliGRAM(s) Oral every 12 hours  bisacodyl 5 milliGRAM(s) Oral daily  chlorhexidine 4% Liquid 1 Application(s) Topical daily  clopidogrel Tablet 75 milliGRAM(s) Oral daily  dextrose 5%. 1000 milliLiter(s) (100 mL/Hr) IV Continuous <Continuous>  dextrose 5%. 1000 milliLiter(s) (50 mL/Hr) IV Continuous <Continuous>  dextrose 50% Injectable 25 Gram(s) IV Push once  dextrose 50% Injectable 12.5 Gram(s) IV Push once  dextrose 50% Injectable 25 Gram(s) IV Push once  dextrose Oral Gel 15 Gram(s) Oral once  ertapenem  IVPB 1000 milliGRAM(s) IV Intermittent every 24 hours  fluticasone propionate/ salmeterol 250-50 MICROgram(s) Diskus 1 Dose(s) Inhalation two times a day  glucagon  Injectable 1 milliGRAM(s) IntraMuscular once  insulin glargine Injectable (LANTUS) 24 Unit(s) SubCutaneous before breakfast  insulin glargine Injectable (LANTUS) 14 Unit(s) SubCutaneous at bedtime  insulin lispro (ADMELOG) corrective regimen sliding scale   SubCutaneous three times a day before meals  insulin lispro (ADMELOG) corrective regimen sliding scale   SubCutaneous at bedtime  labetalol 100 milliGRAM(s) Oral every 8 hours  lacosamide 100 milliGRAM(s) Oral two times a day  levETIRAcetam 1500 milliGRAM(s) Oral two times a day  methylPREDNISolone 8 milliGRAM(s) Oral two times a day  mexiletine 200 milliGRAM(s) Oral two times a day  montelukast 10 milliGRAM(s) Oral daily  multivitamin 1 Tablet(s) Oral daily  NIFEdipine XL 30 milliGRAM(s) Oral daily  pantoprazole    Tablet 40 milliGRAM(s) Oral before breakfast  rosuvastatin 5 milliGRAM(s) Oral at bedtime  senna 2 Tablet(s) Oral at bedtime  sertraline 50 milliGRAM(s) Oral daily  simethicone 80 milliGRAM(s) Chew daily  sodium chloride 7% Inhalation 4 milliLiter(s) Inhalation every 12 hours  tiotropium 2.5 MICROgram(s) Inhaler 2 Puff(s) Inhalation daily    MEDICATIONS  (PRN):  albuterol    90 MICROgram(s) HFA Inhaler 2 Puff(s) Inhalation every 6 hours PRN for shortness of breath and/or wheezing  aluminum hydroxide/magnesium hydroxide/simethicone Suspension 30 milliLiter(s) Oral every 6 hours PRN Dyspepsia  diphenhydrAMINE Injectable 25 milliGRAM(s) IV Push daily PRN For IV Ertapenem      CAPILLARY BLOOD GLUCOSE      POCT Blood Glucose.: 109 mg/dL (14 Dec 2024 22:06)  POCT Blood Glucose.: 171 mg/dL (14 Dec 2024 17:26)  POCT Blood Glucose.: 292 mg/dL (14 Dec 2024 12:31)  POCT Blood Glucose.: 86 mg/dL (14 Dec 2024 08:43)    I&O's Summary    14 Dec 2024 07:01  -  15 Dec 2024 07:00  --------------------------------------------------------  IN: 0 mL / OUT: 400 mL / NET: -400 mL        PHYSICAL EXAM:  Vital Signs Last 24 Hrs  T(C): 36.6 (15 Dec 2024 05:12), Max: 37.1 (14 Dec 2024 12:00)  T(F): 97.8 (15 Dec 2024 05:12), Max: 98.7 (14 Dec 2024 12:00)  HR: 70 (15 Dec 2024 05:12) (63 - 70)  BP: 160/65 (15 Dec 2024 05:12) (136/65 - 204/96)  BP(mean): --  RR: 18 (15 Dec 2024 05:12) (18 - 18)  SpO2: 97% (15 Dec 2024 05:12) (93% - 97%)    Parameters below as of 15 Dec 2024 05:12  Patient On (Oxygen Delivery Method): room air        CONSTITUTIONAL: NAD, on RA  HEENT: Atraumatic, Normocephalic, EOMI, PERRL, Conjunctiva and sclera clear, moist mucous membranes  Neck: Supple, no elevated JVP.  RESPIRATORY: Normal respiratory effort; right lobes with rhonchi  CARDIOVASCULAR: Regular rate and rhythm, normal S1 and S2, no murmur/rub/gallop  ABDOMEN: Nontender to palpation, normoactive bowel sounds, no rebound/guarding; No hepatosplenomegaly  MSK: no clubbing or cyanosis of digits; no joint swelling or tenderness to palpation; no lower extremity edema; Peripheral pulses are 2+ bilaterally  NEURO: AOx3, no focal deficits  SKIN: No rashes or lesions    LABS:                        10.5   8.09  )-----------( 377      ( 14 Dec 2024 06:26 )             35.2     12-14    143  |  107  |  9   ----------------------------<  68[L]  4.2   |  24  |  0.60    Ca    8.8      14 Dec 2024 06:26  Phos  2.2     12-14  Mg     2.4     12-14    TPro  6.0  /  Alb  3.2[L]  /  TBili  0.2  /  DBili  x   /  AST  27  /  ALT  28  /  AlkPhos  93  12-14          Urinalysis Basic - ( 14 Dec 2024 06:26 )    Color: x / Appearance: x / SG: x / pH: x  Gluc: 68 mg/dL / Ketone: x  / Bili: x / Urobili: x   Blood: x / Protein: x / Nitrite: x   Leuk Esterase: x / RBC: x / WBC x   Sq Epi: x / Non Sq Epi: x / Bacteria: x        Culture - Sputum (collected 14 Dec 2024 21:45)  Source: .Sputum Sputum  Gram Stain (15 Dec 2024 06:27):    Few polymorphonuclear leukocytes per low power field    Few Squamous epithelial cells per low power field    Few Gram Positive Rods seen per oil power field    Few Yeast like cells seen per oil power field    Culture - Blood (collected 12 Dec 2024 15:25)  Source: .Blood BLOOD  Preliminary Report (14 Dec 2024 19:01):    No growth at 48 Hours    Culture - Blood (collected 12 Dec 2024 15:10)  Source: .Blood BLOOD  Preliminary Report (14 Dec 2024 19:01):    No growth at 48 Hours        RADIOLOGY & ADDITIONAL TESTS:  Results Reviewed:   Imaging Personally Reviewed:  Electrocardiogram Personally Reviewed:    COORDINATION OF CARE:  Care Discussed with Consultants/Other Providers [Y/N]:  Prior or Outpatient Records Reviewed [Y/N]:   PROGRESS NOTE:   Authored by Davin Sanchez MD  Internal Medicine      Patient is a 74y old  Female who presents with a chief complaint of     SUBJECTIVE / OVERNIGHT EVENTS: Elevated pressure overnight. Labetalol given with resolution.     Patient seen and examined at bedside  Reports no bowel movement for >2 days.  Breathing has improved. No other complaints.     MEDICATIONS  (STANDING):  apixaban 5 milliGRAM(s) Oral every 12 hours  bisacodyl 5 milliGRAM(s) Oral daily  chlorhexidine 4% Liquid 1 Application(s) Topical daily  clopidogrel Tablet 75 milliGRAM(s) Oral daily  dextrose 5%. 1000 milliLiter(s) (100 mL/Hr) IV Continuous <Continuous>  dextrose 5%. 1000 milliLiter(s) (50 mL/Hr) IV Continuous <Continuous>  dextrose 50% Injectable 25 Gram(s) IV Push once  dextrose 50% Injectable 12.5 Gram(s) IV Push once  dextrose 50% Injectable 25 Gram(s) IV Push once  dextrose Oral Gel 15 Gram(s) Oral once  ertapenem  IVPB 1000 milliGRAM(s) IV Intermittent every 24 hours  fluticasone propionate/ salmeterol 250-50 MICROgram(s) Diskus 1 Dose(s) Inhalation two times a day  glucagon  Injectable 1 milliGRAM(s) IntraMuscular once  insulin glargine Injectable (LANTUS) 24 Unit(s) SubCutaneous before breakfast  insulin glargine Injectable (LANTUS) 14 Unit(s) SubCutaneous at bedtime  insulin lispro (ADMELOG) corrective regimen sliding scale   SubCutaneous three times a day before meals  insulin lispro (ADMELOG) corrective regimen sliding scale   SubCutaneous at bedtime  labetalol 100 milliGRAM(s) Oral every 8 hours  lacosamide 100 milliGRAM(s) Oral two times a day  levETIRAcetam 1500 milliGRAM(s) Oral two times a day  methylPREDNISolone 8 milliGRAM(s) Oral two times a day  mexiletine 200 milliGRAM(s) Oral two times a day  montelukast 10 milliGRAM(s) Oral daily  multivitamin 1 Tablet(s) Oral daily  NIFEdipine XL 30 milliGRAM(s) Oral daily  pantoprazole    Tablet 40 milliGRAM(s) Oral before breakfast  rosuvastatin 5 milliGRAM(s) Oral at bedtime  senna 2 Tablet(s) Oral at bedtime  sertraline 50 milliGRAM(s) Oral daily  simethicone 80 milliGRAM(s) Chew daily  sodium chloride 7% Inhalation 4 milliLiter(s) Inhalation every 12 hours  tiotropium 2.5 MICROgram(s) Inhaler 2 Puff(s) Inhalation daily    MEDICATIONS  (PRN):  albuterol    90 MICROgram(s) HFA Inhaler 2 Puff(s) Inhalation every 6 hours PRN for shortness of breath and/or wheezing  aluminum hydroxide/magnesium hydroxide/simethicone Suspension 30 milliLiter(s) Oral every 6 hours PRN Dyspepsia  diphenhydrAMINE Injectable 25 milliGRAM(s) IV Push daily PRN For IV Ertapenem      CAPILLARY BLOOD GLUCOSE      POCT Blood Glucose.: 109 mg/dL (14 Dec 2024 22:06)  POCT Blood Glucose.: 171 mg/dL (14 Dec 2024 17:26)  POCT Blood Glucose.: 292 mg/dL (14 Dec 2024 12:31)  POCT Blood Glucose.: 86 mg/dL (14 Dec 2024 08:43)    I&O's Summary    14 Dec 2024 07:01  -  15 Dec 2024 07:00  --------------------------------------------------------  IN: 0 mL / OUT: 400 mL / NET: -400 mL        PHYSICAL EXAM:  Vital Signs Last 24 Hrs  T(C): 36.6 (15 Dec 2024 05:12), Max: 37.1 (14 Dec 2024 12:00)  T(F): 97.8 (15 Dec 2024 05:12), Max: 98.7 (14 Dec 2024 12:00)  HR: 70 (15 Dec 2024 05:12) (63 - 70)  BP: 160/65 (15 Dec 2024 05:12) (136/65 - 204/96)  BP(mean): --  RR: 18 (15 Dec 2024 05:12) (18 - 18)  SpO2: 97% (15 Dec 2024 05:12) (93% - 97%)    Parameters below as of 15 Dec 2024 05:12  Patient On (Oxygen Delivery Method): room air        CONSTITUTIONAL: NAD, on RA  HEENT: Atraumatic, Normocephalic, EOMI, PERRL, Conjunctiva and sclera clear, moist mucous membranes  Neck: Supple, no elevated JVP.  RESPIRATORY: Normal respiratory effort; right lobes with rhonchi  CARDIOVASCULAR: Regular rate and rhythm, normal S1 and S2, no murmur/rub/gallop  ABDOMEN: Nontender to palpation, normoactive bowel sounds, no rebound/guarding; No hepatosplenomegaly  MSK: no clubbing or cyanosis of digits; no joint swelling or tenderness to palpation; no lower extremity edema; Peripheral pulses are 2+ bilaterally  NEURO: AOx3, no focal deficits  SKIN: No rashes or lesions    LABS:                        10.5   8.09  )-----------( 377      ( 14 Dec 2024 06:26 )             35.2     12-14    143  |  107  |  9   ----------------------------<  68[L]  4.2   |  24  |  0.60    Ca    8.8      14 Dec 2024 06:26  Phos  2.2     12-14  Mg     2.4     12-14    TPro  6.0  /  Alb  3.2[L]  /  TBili  0.2  /  DBili  x   /  AST  27  /  ALT  28  /  AlkPhos  93  12-14          Urinalysis Basic - ( 14 Dec 2024 06:26 )    Color: x / Appearance: x / SG: x / pH: x  Gluc: 68 mg/dL / Ketone: x  / Bili: x / Urobili: x   Blood: x / Protein: x / Nitrite: x   Leuk Esterase: x / RBC: x / WBC x   Sq Epi: x / Non Sq Epi: x / Bacteria: x        Culture - Sputum (collected 14 Dec 2024 21:45)  Source: .Sputum Sputum  Gram Stain (15 Dec 2024 06:27):    Few polymorphonuclear leukocytes per low power field    Few Squamous epithelial cells per low power field    Few Gram Positive Rods seen per oil power field    Few Yeast like cells seen per oil power field    Culture - Blood (collected 12 Dec 2024 15:25)  Source: .Blood BLOOD  Preliminary Report (14 Dec 2024 19:01):    No growth at 48 Hours    Culture - Blood (collected 12 Dec 2024 15:10)  Source: .Blood BLOOD  Preliminary Report (14 Dec 2024 19:01):    No growth at 48 Hours      COORDINATION OF CARE:  Care Discussed with Consultants/Other Providers [Y/N]: Y  Prior or Outpatient Records Reviewed [Y/N]: Y

## 2024-12-15 NOTE — PROGRESS NOTE ADULT - ASSESSMENT
74F hx type a aortic dissection s/p repair, aortic stenosis s/p TAVR in 2023, asthma on chronic steroids, bronchiectasis, pneumonia, tracheomalacia s/p tracheoplasty, T2DM, A-fib on Eliquis, colorectal cancer status post colostomy, seizures, HTN, HLD, presenting for shortness of breath, worsening cough. and green sputum for the past 2 weeks concerning for PNA. CXR showing bibasilar opacities with mild pleural effusion. Started on Ertapenem 1g daily and Azithromycin 500mg  qD empirically with premedication with Benadryl (12/13 -12/17) and continued home med PO methylprednisolone 8 mg BID.     Discussed with Dr. Allison, pt's pulmonologist, defers to abx treatment to primary team and recommends outpatient follow up.

## 2024-12-15 NOTE — PROVIDER CONTACT NOTE (OTHER) - SITUATION
Pt's blood pressure was assessed by PCA at 204/96. RN immediately retook b/p after telling pt to relax and breathe deeply. B/p reassessed at 169/84. Labetolol then given

## 2024-12-15 NOTE — PROGRESS NOTE ADULT - PROBLEM SELECTOR PLAN 5
On basaglar 30 qd in AM and 18 qhs  - C/w lantus 24u qd in the AM and 14U before bedtime  - SSI premeal

## 2024-12-15 NOTE — PROGRESS NOTE ADULT - PROBLEM SELECTOR PLAN 1
Presenting with cough, green sputum production, SOB, subjective fevers; Hx of recurrent PNA, most recent admission 11/5-11-9/2024   - CXR showing bibasilar opacities with mild pleural effusion  - Empiric abx with Ertapenem (12/15 DAY 3/5) with benadryl prior; previously     - Allergies to penicillin, cefepime, meropenem  - UA neg; MRSA PCR, Urine legionella, full RVP neg  - blood culture NGTD 24hr  - trop 42  - WBC 11.93 ->8.09 on abx    Plan:  - On Ertapenem 1g daily empirically with premedication with Benadryl (12/13 - )  - On Azithromycin for atypical coverage (12/13- )  - Discussed with Dr. Allison, pt's pulmonologist, defers to abx treatment to primary team and recommends outpatient follow up.  - Chest PT/chest vest, airway clearance  - pending sputum culture

## 2024-12-15 NOTE — PROVIDER CONTACT NOTE (OTHER) - ASSESSMENT
Pt denies chest pain, no SOB, no HA, no dizziness. Remains A&Ox4. Pt c/o stomach gas and burping excessively requesting simethicone.

## 2024-12-15 NOTE — PROGRESS NOTE ADULT - PROBLEM SELECTOR PLAN 7
DVT PPx: eliquis  Diet: DASH  BM regimen: Senna, miralax BID, Dulcolax   Dispo: Home  PCP: Bon Samuels  Pharmacy: [address]

## 2024-12-16 LAB
ALBUMIN SERPL ELPH-MCNC: 3.8 G/DL — SIGNIFICANT CHANGE UP (ref 3.3–5)
ALP SERPL-CCNC: 91 U/L — SIGNIFICANT CHANGE UP (ref 40–120)
ALT FLD-CCNC: 23 U/L — SIGNIFICANT CHANGE UP (ref 10–45)
ANION GAP SERPL CALC-SCNC: 14 MMOL/L — SIGNIFICANT CHANGE UP (ref 5–17)
AST SERPL-CCNC: 23 U/L — SIGNIFICANT CHANGE UP (ref 10–40)
BASOPHILS # BLD AUTO: 0.03 K/UL — SIGNIFICANT CHANGE UP (ref 0–0.2)
BASOPHILS NFR BLD AUTO: 0.3 % — SIGNIFICANT CHANGE UP (ref 0–2)
BILIRUB SERPL-MCNC: 0.2 MG/DL — SIGNIFICANT CHANGE UP (ref 0.2–1.2)
BUN SERPL-MCNC: 13 MG/DL — SIGNIFICANT CHANGE UP (ref 7–23)
CALCIUM SERPL-MCNC: 8.8 MG/DL — SIGNIFICANT CHANGE UP (ref 8.4–10.5)
CHLORIDE SERPL-SCNC: 104 MMOL/L — SIGNIFICANT CHANGE UP (ref 96–108)
CO2 SERPL-SCNC: 24 MMOL/L — SIGNIFICANT CHANGE UP (ref 22–31)
CREAT SERPL-MCNC: 0.7 MG/DL — SIGNIFICANT CHANGE UP (ref 0.5–1.3)
CULTURE RESULTS: ABNORMAL
EGFR: 90 ML/MIN/1.73M2 — SIGNIFICANT CHANGE UP
EOSINOPHIL # BLD AUTO: 0.07 K/UL — SIGNIFICANT CHANGE UP (ref 0–0.5)
EOSINOPHIL NFR BLD AUTO: 0.6 % — SIGNIFICANT CHANGE UP (ref 0–6)
GAS PNL BLDV: SIGNIFICANT CHANGE UP
GLUCOSE BLDC GLUCOMTR-MCNC: 143 MG/DL — HIGH (ref 70–99)
GLUCOSE BLDC GLUCOMTR-MCNC: 160 MG/DL — HIGH (ref 70–99)
GLUCOSE BLDC GLUCOMTR-MCNC: 85 MG/DL — SIGNIFICANT CHANGE UP (ref 70–99)
GLUCOSE BLDC GLUCOMTR-MCNC: 90 MG/DL — SIGNIFICANT CHANGE UP (ref 70–99)
GLUCOSE SERPL-MCNC: 88 MG/DL — SIGNIFICANT CHANGE UP (ref 70–99)
HCT VFR BLD CALC: 38.8 % — SIGNIFICANT CHANGE UP (ref 34.5–45)
HGB BLD-MCNC: 11.4 G/DL — LOW (ref 11.5–15.5)
IMM GRANULOCYTES NFR BLD AUTO: 0.5 % — SIGNIFICANT CHANGE UP (ref 0–0.9)
LYMPHOCYTES # BLD AUTO: 19.5 % — SIGNIFICANT CHANGE UP (ref 13–44)
LYMPHOCYTES # BLD AUTO: 2.1 K/UL — SIGNIFICANT CHANGE UP (ref 1–3.3)
MAGNESIUM SERPL-MCNC: 2.2 MG/DL — SIGNIFICANT CHANGE UP (ref 1.6–2.6)
MCHC RBC-ENTMCNC: 22.6 PG — LOW (ref 27–34)
MCHC RBC-ENTMCNC: 29.4 G/DL — LOW (ref 32–36)
MCV RBC AUTO: 77 FL — LOW (ref 80–100)
MONOCYTES # BLD AUTO: 0.97 K/UL — HIGH (ref 0–0.9)
MONOCYTES NFR BLD AUTO: 9 % — SIGNIFICANT CHANGE UP (ref 2–14)
NEUTROPHILS # BLD AUTO: 7.55 K/UL — HIGH (ref 1.8–7.4)
NEUTROPHILS NFR BLD AUTO: 70.1 % — SIGNIFICANT CHANGE UP (ref 43–77)
NRBC # BLD: 0 /100 WBCS — SIGNIFICANT CHANGE UP (ref 0–0)
PHOSPHATE SERPL-MCNC: 2.9 MG/DL — SIGNIFICANT CHANGE UP (ref 2.5–4.5)
PLATELET # BLD AUTO: 386 K/UL — SIGNIFICANT CHANGE UP (ref 150–400)
POTASSIUM SERPL-MCNC: 4.5 MMOL/L — SIGNIFICANT CHANGE UP (ref 3.5–5.3)
POTASSIUM SERPL-SCNC: 4.5 MMOL/L — SIGNIFICANT CHANGE UP (ref 3.5–5.3)
PROT SERPL-MCNC: 6.2 G/DL — SIGNIFICANT CHANGE UP (ref 6–8.3)
RBC # BLD: 5.04 M/UL — SIGNIFICANT CHANGE UP (ref 3.8–5.2)
RBC # FLD: 21.1 % — HIGH (ref 10.3–14.5)
SODIUM SERPL-SCNC: 142 MMOL/L — SIGNIFICANT CHANGE UP (ref 135–145)
SPECIMEN SOURCE: SIGNIFICANT CHANGE UP
WBC # BLD: 10.77 K/UL — HIGH (ref 3.8–10.5)
WBC # FLD AUTO: 10.77 K/UL — HIGH (ref 3.8–10.5)

## 2024-12-16 PROCEDURE — 99232 SBSQ HOSP IP/OBS MODERATE 35: CPT

## 2024-12-16 RX ORDER — CETIRIZINE HYDROCHLORIDE 10 MG/1
5 TABLET ORAL ONCE
Refills: 0 | Status: COMPLETED | OUTPATIENT
Start: 2024-12-16 | End: 2024-12-16

## 2024-12-16 RX ADMIN — LABETALOL 100 MILLIGRAM(S): 100 TABLET, FILM COATED ORAL at 06:47

## 2024-12-16 RX ADMIN — LEVETIRACETAM 1500 MILLIGRAM(S): 1000 TABLET ORAL at 06:50

## 2024-12-16 RX ADMIN — FLUTICASONE PROPIONATE AND SALMETEROL XINAFOATE 1 DOSE(S): 45; 21 AEROSOL, METERED RESPIRATORY (INHALATION) at 06:50

## 2024-12-16 RX ADMIN — LACOSAMIDE 100 MILLIGRAM(S): 10 INJECTION INTRAVENOUS at 18:09

## 2024-12-16 RX ADMIN — APIXABAN 5 MILLIGRAM(S): 2.5 TABLET, FILM COATED ORAL at 06:50

## 2024-12-16 RX ADMIN — MONTELUKAST SODIUM 10 MILLIGRAM(S): 10 TABLET ORAL at 12:43

## 2024-12-16 RX ADMIN — SERTRALINE HYDROCHLORIDE 50 MILLIGRAM(S): 100 TABLET, FILM COATED ORAL at 12:43

## 2024-12-16 RX ADMIN — PANTOPRAZOLE SODIUM 40 MILLIGRAM(S): 40 TABLET, DELAYED RELEASE ORAL at 06:50

## 2024-12-16 RX ADMIN — Medication 2 PUFF(S): at 12:39

## 2024-12-16 RX ADMIN — APIXABAN 5 MILLIGRAM(S): 2.5 TABLET, FILM COATED ORAL at 18:09

## 2024-12-16 RX ADMIN — POLYETHYLENE GLYCOL 3350 17 GRAM(S): 17 POWDER, FOR SOLUTION ORAL at 18:10

## 2024-12-16 RX ADMIN — LEVETIRACETAM 1500 MILLIGRAM(S): 1000 TABLET ORAL at 18:10

## 2024-12-16 RX ADMIN — CHLORHEXIDINE GLUCONATE 1 APPLICATION(S): 1.2 RINSE ORAL at 12:42

## 2024-12-16 RX ADMIN — Medication 200 MILLIGRAM(S): at 18:09

## 2024-12-16 RX ADMIN — ERTAPENEM 120 MILLIGRAM(S): 1 INJECTION, POWDER, LYOPHILIZED, FOR SOLUTION INTRAMUSCULAR; INTRAVENOUS at 06:46

## 2024-12-16 RX ADMIN — LACOSAMIDE 100 MILLIGRAM(S): 10 INJECTION INTRAVENOUS at 06:50

## 2024-12-16 RX ADMIN — INSULIN GLARGINE 14 UNIT(S): 100 INJECTION, SOLUTION SUBCUTANEOUS at 22:15

## 2024-12-16 RX ADMIN — Medication 8 MILLIGRAM(S): at 06:47

## 2024-12-16 RX ADMIN — Medication 200 MILLIGRAM(S): at 06:49

## 2024-12-16 RX ADMIN — Medication 8 MILLIGRAM(S): at 18:09

## 2024-12-16 RX ADMIN — CLOPIDOGREL 75 MILLIGRAM(S): 75 TABLET, FILM COATED ORAL at 12:43

## 2024-12-16 RX ADMIN — SODIUM CHLORIDE 4 MILLILITER(S): 9 INJECTION, SOLUTION INTRAMUSCULAR; INTRAVENOUS; SUBCUTANEOUS at 18:13

## 2024-12-16 RX ADMIN — Medication 1: at 18:11

## 2024-12-16 RX ADMIN — Medication 1 TABLET(S): at 12:42

## 2024-12-16 RX ADMIN — Medication 2 TABLET(S): at 21:21

## 2024-12-16 RX ADMIN — SODIUM CHLORIDE 4 MILLILITER(S): 9 INJECTION, SOLUTION INTRAMUSCULAR; INTRAVENOUS; SUBCUTANEOUS at 06:51

## 2024-12-16 RX ADMIN — Medication 5 MILLIGRAM(S): at 12:43

## 2024-12-16 RX ADMIN — Medication 30 MILLIGRAM(S): at 06:50

## 2024-12-16 RX ADMIN — ROSUVASTATIN CALCIUM 5 MILLIGRAM(S): 5 TABLET, FILM COATED ORAL at 21:21

## 2024-12-16 RX ADMIN — INSULIN GLARGINE 24 UNIT(S): 100 INJECTION, SOLUTION SUBCUTANEOUS at 08:50

## 2024-12-16 RX ADMIN — Medication 80 MILLIGRAM(S): at 12:43

## 2024-12-16 RX ADMIN — POLYETHYLENE GLYCOL 3350 17 GRAM(S): 17 POWDER, FOR SOLUTION ORAL at 06:51

## 2024-12-16 RX ADMIN — FLUTICASONE PROPIONATE AND SALMETEROL XINAFOATE 1 DOSE(S): 45; 21 AEROSOL, METERED RESPIRATORY (INHALATION) at 18:08

## 2024-12-16 RX ADMIN — CETIRIZINE HYDROCHLORIDE 5 MILLIGRAM(S): 10 TABLET ORAL at 01:46

## 2024-12-16 RX ADMIN — LABETALOL 100 MILLIGRAM(S): 100 TABLET, FILM COATED ORAL at 21:22

## 2024-12-16 NOTE — PROGRESS NOTE ADULT - PROBLEM SELECTOR PLAN 1
Presenting with cough, green sputum production, SOB, subjective fevers; Hx of recurrent PNA, most recent admission 11/5-11-9/2024   - CXR showing bibasilar opacities with mild pleural effusion  - Empiric abx with Ertapenem (12/13-12/17) with benadryl premedication; previous Allergies to penicillin, cefepime, meropenem  - UA neg; MRSA PCR, Urine legionella, full RVP neg  - blood culture NGTD 24hr  - trop 42  - WBC 11.93 ->8.09 on abx    Plan:  - On Ertapenem 1g daily empirically with premedication with Benadryl (12/13 - )  - On Azithromycin for atypical coverage (12/13- )  - Discussed with Dr. Allison, pt's pulmonologist, defers to abx treatment to primary team and recommends outpatient follow up.  - Chest PT/chest vest, airway clearance  - pending sputum culture Presenting with cough, green sputum production, SOB, subjective fevers; Hx of recurrent PNA, most recent admission 11/5-11-9/2024   - CXR showing bibasilar opacities with mild pleural effusion  - UA neg; MRSA PCR, Urine legionella, full RVP neg  - blood culture NGTD 24hr, sputum cx commensal val  - s/p azithro (12/12-12/14)    Plan:  - c/w Ertapenem 1g daily empirically with premedication with Benadryl (12/13-12/17) for 5 day course  - c/w chest PT/chest vest, airway clearance

## 2024-12-16 NOTE — PROGRESS NOTE ADULT - PROBLEM SELECTOR PLAN 7
DVT PPx: eliquis  Diet: DASH  BM regimen: Senna, miralax BID, Dulcolax   Dispo: Home  PCP: Bon Samuels  Pharmacy: [address] DVT PPx: eliquis  Diet: DASH  BM regimen: Senna, miralax BID, Dulcolax   Dispo: Home, 12/17, [facility] patient   PCP: Bon Samuels  Pharmacy: [address]

## 2024-12-16 NOTE — PROGRESS NOTE ADULT - SUBJECTIVE AND OBJECTIVE BOX
**********************  Nadine Melgar MD  PGY-1, Internal Medicine  **********************  Patient is a 74y old  Female who presents with a chief complaint of     OVERNIGHT EVENTS: No acute events.    SUBJECTIVE:  Patient seen and examined at bedside. Denies fever, chills, HA, cough, sob, chest pain, abdominal pain, dysuria, change in bowel movements.    OBJECTIVE:  Vital Signs Last 12 Hrs  T(F): 98.1 (12-16-24 @ 04:23), Max: 98.1 (12-15-24 @ 20:30)  HR: 60 (12-16-24 @ 04:23) (60 - 65)  BP: 144/63 (12-16-24 @ 04:23) (144/63 - 159/72)  BP(mean): --  RR: 18 (12-16-24 @ 04:23) (18 - 18)  SpO2: 93% (12-16-24 @ 04:23) (93% - 93%)  I&O's Summary    15 Dec 2024 07:01  -  16 Dec 2024 07:00  --------------------------------------------------------  IN: 480 mL / OUT: 1450 mL / NET: -970 mL        General: Patient in NAD  HEENT: NCAT, EOMI, no oral lesions  CV: S1/S2, RRR   Lungs: No respiratory distress, CTAB  Abd: Soft, nontender, no guarding, no rebound tenderness, + bowel sounds   : No suprapubic tenderness  Neuro: Alert and oriented to time, place and person. No focal deficits noted.   Ext: No cyanosis, no edema  Skin: No rash, no phlebitis    LABS:                        11.3   12.49 )-----------( 372      ( 15 Dec 2024 07:28 )             37.9     12-15    141  |  104  |  10  ----------------------------<  125[H]  4.6   |  25  |  0.62    Ca    8.8      15 Dec 2024 07:28  Phos  2.2     12-15  Mg     2.3     12-15    TPro  6.5  /  Alb  3.5  /  TBili  0.2  /  DBili  x   /  AST  15  /  ALT  22  /  AlkPhos  96  12-15      Urinalysis Basic - ( 15 Dec 2024 07:28 )    Color: x / Appearance: x / SG: x / pH: x  Gluc: 125 mg/dL / Ketone: x  / Bili: x / Urobili: x   Blood: x / Protein: x / Nitrite: x   Leuk Esterase: x / RBC: x / WBC x   Sq Epi: x / Non Sq Epi: x / Bacteria: x        Culture - Sputum (collected 14 Dec 2024 21:45)  Source: .Sputum Sputum  Gram Stain (15 Dec 2024 06:27):    Few polymorphonuclear leukocytes per low power field    Few Squamous epithelial cells per low power field    Few Gram Positive Rods seen per oil power field    Few Yeast like cells seen per oil power field  Preliminary Report (15 Dec 2024 16:56):    Commensal val consistent with body site    Culture - Blood (collected 12 Dec 2024 15:25)  Source: .Blood BLOOD  Preliminary Report (15 Dec 2024 19:01):    No growth at 72 Hours    Culture - Blood (collected 12 Dec 2024 15:10)  Source: .Blood BLOOD  Preliminary Report (15 Dec 2024 19:01):    No growth at 72 Hours        RADIOLOGY & ADDITIONAL TESTS: Reviewed.    MEDICATIONS:  MEDICATIONS  (STANDING):  apixaban 5 milliGRAM(s) Oral every 12 hours  bisacodyl 5 milliGRAM(s) Oral daily  chlorhexidine 4% Liquid 1 Application(s) Topical daily  clopidogrel Tablet 75 milliGRAM(s) Oral daily  dextrose 5%. 1000 milliLiter(s) (50 mL/Hr) IV Continuous <Continuous>  dextrose 5%. 1000 milliLiter(s) (100 mL/Hr) IV Continuous <Continuous>  dextrose 50% Injectable 25 Gram(s) IV Push once  dextrose 50% Injectable 12.5 Gram(s) IV Push once  dextrose 50% Injectable 25 Gram(s) IV Push once  dextrose Oral Gel 15 Gram(s) Oral once  ertapenem  IVPB 1000 milliGRAM(s) IV Intermittent every 24 hours  fluticasone propionate/ salmeterol 250-50 MICROgram(s) Diskus 1 Dose(s) Inhalation two times a day  glucagon  Injectable 1 milliGRAM(s) IntraMuscular once  insulin glargine Injectable (LANTUS) 24 Unit(s) SubCutaneous before breakfast  insulin glargine Injectable (LANTUS) 14 Unit(s) SubCutaneous at bedtime  insulin lispro (ADMELOG) corrective regimen sliding scale   SubCutaneous three times a day before meals  insulin lispro (ADMELOG) corrective regimen sliding scale   SubCutaneous at bedtime  labetalol 100 milliGRAM(s) Oral every 8 hours  lacosamide 100 milliGRAM(s) Oral two times a day  levETIRAcetam 1500 milliGRAM(s) Oral two times a day  methylPREDNISolone 8 milliGRAM(s) Oral two times a day  mexiletine 200 milliGRAM(s) Oral two times a day  montelukast 10 milliGRAM(s) Oral daily  multivitamin 1 Tablet(s) Oral daily  NIFEdipine XL 30 milliGRAM(s) Oral daily  pantoprazole    Tablet 40 milliGRAM(s) Oral before breakfast  polyethylene glycol 3350 17 Gram(s) Oral two times a day  rosuvastatin 5 milliGRAM(s) Oral at bedtime  senna 2 Tablet(s) Oral at bedtime  sertraline 50 milliGRAM(s) Oral daily  simethicone 80 milliGRAM(s) Chew daily  sodium chloride 7% Inhalation 4 milliLiter(s) Inhalation every 12 hours  tiotropium 2.5 MICROgram(s) Inhaler 2 Puff(s) Inhalation daily    MEDICATIONS  (PRN):  albuterol    90 MICROgram(s) HFA Inhaler 2 Puff(s) Inhalation every 6 hours PRN for shortness of breath and/or wheezing  aluminum hydroxide/magnesium hydroxide/simethicone Suspension 30 milliLiter(s) Oral every 6 hours PRN Dyspepsia  diphenhydrAMINE Injectable 25 milliGRAM(s) IV Push daily PRN For IV Ertapenem   **********************  Nadine Melgar MD  PGY-1, Internal Medicine  **********************  Patient is a 74y old  Female who presents with a chief complaint of PNA    OVERNIGHT EVENTS: No acute events.    SUBJECTIVE:  Patient seen and examined at bedside. Feels well, cough stable with light yellow sputum. Denies sob, fever, chills, chest pain, abdominal pain.    OBJECTIVE:  Vital Signs Last 12 Hrs  T(F): 98.1 (12-16-24 @ 04:23), Max: 98.1 (12-15-24 @ 20:30)  HR: 60 (12-16-24 @ 04:23) (60 - 65)  BP: 144/63 (12-16-24 @ 04:23) (144/63 - 159/72)  BP(mean): --  RR: 18 (12-16-24 @ 04:23) (18 - 18)  SpO2: 93% (12-16-24 @ 04:23) (93% - 93%)  I&O's Summary    15 Dec 2024 07:01  -  16 Dec 2024 07:00  --------------------------------------------------------  IN: 480 mL / OUT: 1450 mL / NET: -970 mL        General: Patient in NAD  HEENT: NCAT, EOMI, no oral lesions  CV: S1/S2, RRR   Lungs: No respiratory distress, rhonchorous cough  Abd: Soft, nontender, no guarding, no rebound tenderness, + bowel sounds   : No suprapubic tenderness  Neuro: Alert and oriented to time, place and person. No focal deficits noted.   Ext: No cyanosis, no edema  Skin: No rash, no phlebitis    LABS:                        11.3   12.49 )-----------( 372      ( 15 Dec 2024 07:28 )             37.9     12-15    141  |  104  |  10  ----------------------------<  125[H]  4.6   |  25  |  0.62    Ca    8.8      15 Dec 2024 07:28  Phos  2.2     12-15  Mg     2.3     12-15    TPro  6.5  /  Alb  3.5  /  TBili  0.2  /  DBili  x   /  AST  15  /  ALT  22  /  AlkPhos  96  12-15      Urinalysis Basic - ( 15 Dec 2024 07:28 )    Color: x / Appearance: x / SG: x / pH: x  Gluc: 125 mg/dL / Ketone: x  / Bili: x / Urobili: x   Blood: x / Protein: x / Nitrite: x   Leuk Esterase: x / RBC: x / WBC x   Sq Epi: x / Non Sq Epi: x / Bacteria: x        Culture - Sputum (collected 14 Dec 2024 21:45)  Source: .Sputum Sputum  Gram Stain (15 Dec 2024 06:27):    Few polymorphonuclear leukocytes per low power field    Few Squamous epithelial cells per low power field    Few Gram Positive Rods seen per oil power field    Few Yeast like cells seen per oil power field  Preliminary Report (15 Dec 2024 16:56):    Commensal val consistent with body site    Culture - Blood (collected 12 Dec 2024 15:25)  Source: .Blood BLOOD  Preliminary Report (15 Dec 2024 19:01):    No growth at 72 Hours    Culture - Blood (collected 12 Dec 2024 15:10)  Source: .Blood BLOOD  Preliminary Report (15 Dec 2024 19:01):    No growth at 72 Hours        RADIOLOGY & ADDITIONAL TESTS: Reviewed.    MEDICATIONS:  MEDICATIONS  (STANDING):  apixaban 5 milliGRAM(s) Oral every 12 hours  bisacodyl 5 milliGRAM(s) Oral daily  chlorhexidine 4% Liquid 1 Application(s) Topical daily  clopidogrel Tablet 75 milliGRAM(s) Oral daily  dextrose 5%. 1000 milliLiter(s) (50 mL/Hr) IV Continuous <Continuous>  dextrose 5%. 1000 milliLiter(s) (100 mL/Hr) IV Continuous <Continuous>  dextrose 50% Injectable 25 Gram(s) IV Push once  dextrose 50% Injectable 12.5 Gram(s) IV Push once  dextrose 50% Injectable 25 Gram(s) IV Push once  dextrose Oral Gel 15 Gram(s) Oral once  ertapenem  IVPB 1000 milliGRAM(s) IV Intermittent every 24 hours  fluticasone propionate/ salmeterol 250-50 MICROgram(s) Diskus 1 Dose(s) Inhalation two times a day  glucagon  Injectable 1 milliGRAM(s) IntraMuscular once  insulin glargine Injectable (LANTUS) 24 Unit(s) SubCutaneous before breakfast  insulin glargine Injectable (LANTUS) 14 Unit(s) SubCutaneous at bedtime  insulin lispro (ADMELOG) corrective regimen sliding scale   SubCutaneous three times a day before meals  insulin lispro (ADMELOG) corrective regimen sliding scale   SubCutaneous at bedtime  labetalol 100 milliGRAM(s) Oral every 8 hours  lacosamide 100 milliGRAM(s) Oral two times a day  levETIRAcetam 1500 milliGRAM(s) Oral two times a day  methylPREDNISolone 8 milliGRAM(s) Oral two times a day  mexiletine 200 milliGRAM(s) Oral two times a day  montelukast 10 milliGRAM(s) Oral daily  multivitamin 1 Tablet(s) Oral daily  NIFEdipine XL 30 milliGRAM(s) Oral daily  pantoprazole    Tablet 40 milliGRAM(s) Oral before breakfast  polyethylene glycol 3350 17 Gram(s) Oral two times a day  rosuvastatin 5 milliGRAM(s) Oral at bedtime  senna 2 Tablet(s) Oral at bedtime  sertraline 50 milliGRAM(s) Oral daily  simethicone 80 milliGRAM(s) Chew daily  sodium chloride 7% Inhalation 4 milliLiter(s) Inhalation every 12 hours  tiotropium 2.5 MICROgram(s) Inhaler 2 Puff(s) Inhalation daily    MEDICATIONS  (PRN):  albuterol    90 MICROgram(s) HFA Inhaler 2 Puff(s) Inhalation every 6 hours PRN for shortness of breath and/or wheezing  aluminum hydroxide/magnesium hydroxide/simethicone Suspension 30 milliLiter(s) Oral every 6 hours PRN Dyspepsia  diphenhydrAMINE Injectable 25 milliGRAM(s) IV Push daily PRN For IV Ertapenem

## 2024-12-16 NOTE — PROGRESS NOTE ADULT - ASSESSMENT
74F hx type a aortic dissection s/p repair, aortic stenosis s/p TAVR in 2023, asthma on chronic steroids, bronchiectasis, pneumonia, tracheomalacia s/p tracheoplasty, T2DM, A-fib on Eliquis, colorectal cancer status post colostomy, seizures, HTN, HLD admitted for PNA pending  completion of ertapenem course 12/17  74F hx type a aortic dissection s/p repair, aortic stenosis s/p TAVR in 2023, asthma on chronic steroids, bronchiectasis, pneumonia, tracheomalacia s/p tracheoplasty, T2DM, A-fib on Eliquis, colorectal cancer status post colostomy, seizures, HTN, HLD admitted for pneumonia, pending  completion of ertapenem course 12/17

## 2024-12-17 ENCOUNTER — TRANSCRIPTION ENCOUNTER (OUTPATIENT)
Age: 74
End: 2024-12-17

## 2024-12-17 VITALS
DIASTOLIC BLOOD PRESSURE: 75 MMHG | SYSTOLIC BLOOD PRESSURE: 159 MMHG | RESPIRATION RATE: 18 BRPM | OXYGEN SATURATION: 95 % | HEART RATE: 63 BPM | TEMPERATURE: 99 F

## 2024-12-17 LAB
ALBUMIN SERPL ELPH-MCNC: 3.8 G/DL — SIGNIFICANT CHANGE UP (ref 3.3–5)
ALP SERPL-CCNC: 92 U/L — SIGNIFICANT CHANGE UP (ref 40–120)
ALT FLD-CCNC: 26 U/L — SIGNIFICANT CHANGE UP (ref 10–45)
ANION GAP SERPL CALC-SCNC: 11 MMOL/L — SIGNIFICANT CHANGE UP (ref 5–17)
AST SERPL-CCNC: 31 U/L — SIGNIFICANT CHANGE UP (ref 10–40)
BASOPHILS # BLD AUTO: 0.02 K/UL — SIGNIFICANT CHANGE UP (ref 0–0.2)
BASOPHILS NFR BLD AUTO: 0.2 % — SIGNIFICANT CHANGE UP (ref 0–2)
BILIRUB SERPL-MCNC: 0.2 MG/DL — SIGNIFICANT CHANGE UP (ref 0.2–1.2)
BUN SERPL-MCNC: 16 MG/DL — SIGNIFICANT CHANGE UP (ref 7–23)
CALCIUM SERPL-MCNC: 9.1 MG/DL — SIGNIFICANT CHANGE UP (ref 8.4–10.5)
CHLORIDE SERPL-SCNC: 104 MMOL/L — SIGNIFICANT CHANGE UP (ref 96–108)
CO2 SERPL-SCNC: 24 MMOL/L — SIGNIFICANT CHANGE UP (ref 22–31)
CREAT SERPL-MCNC: 0.64 MG/DL — SIGNIFICANT CHANGE UP (ref 0.5–1.3)
CULTURE RESULTS: SIGNIFICANT CHANGE UP
CULTURE RESULTS: SIGNIFICANT CHANGE UP
EGFR: 92 ML/MIN/1.73M2 — SIGNIFICANT CHANGE UP
EOSINOPHIL # BLD AUTO: 0.03 K/UL — SIGNIFICANT CHANGE UP (ref 0–0.5)
EOSINOPHIL NFR BLD AUTO: 0.3 % — SIGNIFICANT CHANGE UP (ref 0–6)
GLUCOSE BLDC GLUCOMTR-MCNC: 152 MG/DL — HIGH (ref 70–99)
GLUCOSE BLDC GLUCOMTR-MCNC: 230 MG/DL — HIGH (ref 70–99)
GLUCOSE SERPL-MCNC: 168 MG/DL — HIGH (ref 70–99)
HCT VFR BLD CALC: 40.7 % — SIGNIFICANT CHANGE UP (ref 34.5–45)
HGB BLD-MCNC: 11.9 G/DL — SIGNIFICANT CHANGE UP (ref 11.5–15.5)
IMM GRANULOCYTES NFR BLD AUTO: 0.6 % — SIGNIFICANT CHANGE UP (ref 0–0.9)
LYMPHOCYTES # BLD AUTO: 1.46 K/UL — SIGNIFICANT CHANGE UP (ref 1–3.3)
LYMPHOCYTES # BLD AUTO: 16.8 % — SIGNIFICANT CHANGE UP (ref 13–44)
MAGNESIUM SERPL-MCNC: 2.2 MG/DL — SIGNIFICANT CHANGE UP (ref 1.6–2.6)
MCHC RBC-ENTMCNC: 22 PG — LOW (ref 27–34)
MCHC RBC-ENTMCNC: 29.2 G/DL — LOW (ref 32–36)
MCV RBC AUTO: 75.2 FL — LOW (ref 80–100)
MONOCYTES # BLD AUTO: 0.66 K/UL — SIGNIFICANT CHANGE UP (ref 0–0.9)
MONOCYTES NFR BLD AUTO: 7.6 % — SIGNIFICANT CHANGE UP (ref 2–14)
NEUTROPHILS # BLD AUTO: 6.47 K/UL — SIGNIFICANT CHANGE UP (ref 1.8–7.4)
NEUTROPHILS NFR BLD AUTO: 74.5 % — SIGNIFICANT CHANGE UP (ref 43–77)
NRBC # BLD: 0 /100 WBCS — SIGNIFICANT CHANGE UP (ref 0–0)
PHOSPHATE SERPL-MCNC: 2.8 MG/DL — SIGNIFICANT CHANGE UP (ref 2.5–4.5)
PLATELET # BLD AUTO: 389 K/UL — SIGNIFICANT CHANGE UP (ref 150–400)
POTASSIUM SERPL-MCNC: 4.7 MMOL/L — SIGNIFICANT CHANGE UP (ref 3.5–5.3)
POTASSIUM SERPL-SCNC: 4.7 MMOL/L — SIGNIFICANT CHANGE UP (ref 3.5–5.3)
PROT SERPL-MCNC: 6.7 G/DL — SIGNIFICANT CHANGE UP (ref 6–8.3)
RBC # BLD: 5.41 M/UL — HIGH (ref 3.8–5.2)
RBC # FLD: 21 % — HIGH (ref 10.3–14.5)
SODIUM SERPL-SCNC: 139 MMOL/L — SIGNIFICANT CHANGE UP (ref 135–145)
SPECIMEN SOURCE: SIGNIFICANT CHANGE UP
SPECIMEN SOURCE: SIGNIFICANT CHANGE UP
WBC # BLD: 8.69 K/UL — SIGNIFICANT CHANGE UP (ref 3.8–10.5)
WBC # FLD AUTO: 8.69 K/UL — SIGNIFICANT CHANGE UP (ref 3.8–10.5)

## 2024-12-17 PROCEDURE — 84295 ASSAY OF SERUM SODIUM: CPT

## 2024-12-17 PROCEDURE — 87637 SARSCOV2&INF A&B&RSV AMP PRB: CPT

## 2024-12-17 PROCEDURE — 82962 GLUCOSE BLOOD TEST: CPT

## 2024-12-17 PROCEDURE — 76937 US GUIDE VASCULAR ACCESS: CPT

## 2024-12-17 PROCEDURE — 85018 HEMOGLOBIN: CPT

## 2024-12-17 PROCEDURE — 82435 ASSAY OF BLOOD CHLORIDE: CPT

## 2024-12-17 PROCEDURE — 84484 ASSAY OF TROPONIN QUANT: CPT

## 2024-12-17 PROCEDURE — 83735 ASSAY OF MAGNESIUM: CPT

## 2024-12-17 PROCEDURE — 82947 ASSAY GLUCOSE BLOOD QUANT: CPT

## 2024-12-17 PROCEDURE — 80053 COMPREHEN METABOLIC PANEL: CPT

## 2024-12-17 PROCEDURE — C1751: CPT

## 2024-12-17 PROCEDURE — 85025 COMPLETE CBC W/AUTO DIFF WBC: CPT

## 2024-12-17 PROCEDURE — 94640 AIRWAY INHALATION TREATMENT: CPT

## 2024-12-17 PROCEDURE — 87641 MR-STAPH DNA AMP PROBE: CPT

## 2024-12-17 PROCEDURE — 87640 STAPH A DNA AMP PROBE: CPT

## 2024-12-17 PROCEDURE — 87040 BLOOD CULTURE FOR BACTERIA: CPT

## 2024-12-17 PROCEDURE — 81001 URINALYSIS AUTO W/SCOPE: CPT

## 2024-12-17 PROCEDURE — 87070 CULTURE OTHR SPECIMN AEROBIC: CPT

## 2024-12-17 PROCEDURE — 36415 COLL VENOUS BLD VENIPUNCTURE: CPT

## 2024-12-17 PROCEDURE — 82803 BLOOD GASES ANY COMBINATION: CPT

## 2024-12-17 PROCEDURE — 97161 PT EVAL LOW COMPLEX 20 MIN: CPT

## 2024-12-17 PROCEDURE — 99239 HOSP IP/OBS DSCHRG MGMT >30: CPT

## 2024-12-17 PROCEDURE — 84132 ASSAY OF SERUM POTASSIUM: CPT

## 2024-12-17 PROCEDURE — 82330 ASSAY OF CALCIUM: CPT

## 2024-12-17 PROCEDURE — 85014 HEMATOCRIT: CPT

## 2024-12-17 PROCEDURE — 87449 NOS EACH ORGANISM AG IA: CPT

## 2024-12-17 PROCEDURE — 83605 ASSAY OF LACTIC ACID: CPT

## 2024-12-17 PROCEDURE — 85730 THROMBOPLASTIN TIME PARTIAL: CPT

## 2024-12-17 PROCEDURE — C1894: CPT

## 2024-12-17 PROCEDURE — 71045 X-RAY EXAM CHEST 1 VIEW: CPT

## 2024-12-17 PROCEDURE — 0241U: CPT

## 2024-12-17 PROCEDURE — 36569 INSJ PICC 5 YR+ W/O IMAGING: CPT

## 2024-12-17 PROCEDURE — 99285 EMERGENCY DEPT VISIT HI MDM: CPT | Mod: 25

## 2024-12-17 PROCEDURE — 0225U NFCT DS DNA&RNA 21 SARSCOV2: CPT

## 2024-12-17 PROCEDURE — G0463: CPT

## 2024-12-17 PROCEDURE — 84100 ASSAY OF PHOSPHORUS: CPT

## 2024-12-17 PROCEDURE — 85610 PROTHROMBIN TIME: CPT

## 2024-12-17 PROCEDURE — 83880 ASSAY OF NATRIURETIC PEPTIDE: CPT

## 2024-12-17 PROCEDURE — 87205 SMEAR GRAM STAIN: CPT

## 2024-12-17 PROCEDURE — 96374 THER/PROPH/DIAG INJ IV PUSH: CPT

## 2024-12-17 RX ORDER — DIPHENHYDRAMINE HCL 25 MG
25 CAPSULE ORAL ONCE
Refills: 0 | Status: COMPLETED | OUTPATIENT
Start: 2024-12-17 | End: 2024-12-17

## 2024-12-17 RX ADMIN — ERTAPENEM 120 MILLIGRAM(S): 1 INJECTION, POWDER, LYOPHILIZED, FOR SOLUTION INTRAMUSCULAR; INTRAVENOUS at 06:10

## 2024-12-17 RX ADMIN — POLYETHYLENE GLYCOL 3350 17 GRAM(S): 17 POWDER, FOR SOLUTION ORAL at 05:34

## 2024-12-17 RX ADMIN — Medication 200 MILLIGRAM(S): at 05:33

## 2024-12-17 RX ADMIN — PANTOPRAZOLE SODIUM 40 MILLIGRAM(S): 40 TABLET, DELAYED RELEASE ORAL at 05:39

## 2024-12-17 RX ADMIN — SODIUM CHLORIDE 4 MILLILITER(S): 9 INJECTION, SOLUTION INTRAMUSCULAR; INTRAVENOUS; SUBCUTANEOUS at 08:40

## 2024-12-17 RX ADMIN — MONTELUKAST SODIUM 10 MILLIGRAM(S): 10 TABLET ORAL at 12:20

## 2024-12-17 RX ADMIN — Medication 1 TABLET(S): at 12:20

## 2024-12-17 RX ADMIN — Medication 30 MILLIGRAM(S): at 05:34

## 2024-12-17 RX ADMIN — Medication 8 MILLIGRAM(S): at 05:32

## 2024-12-17 RX ADMIN — CHLORHEXIDINE GLUCONATE 1 APPLICATION(S): 1.2 RINSE ORAL at 12:21

## 2024-12-17 RX ADMIN — SERTRALINE HYDROCHLORIDE 50 MILLIGRAM(S): 100 TABLET, FILM COATED ORAL at 12:20

## 2024-12-17 RX ADMIN — Medication 25 MILLIGRAM(S): at 05:34

## 2024-12-17 RX ADMIN — Medication 2: at 12:19

## 2024-12-17 RX ADMIN — APIXABAN 5 MILLIGRAM(S): 2.5 TABLET, FILM COATED ORAL at 05:32

## 2024-12-17 RX ADMIN — CLOPIDOGREL 75 MILLIGRAM(S): 75 TABLET, FILM COATED ORAL at 12:20

## 2024-12-17 RX ADMIN — Medication 2 PUFF(S): at 12:21

## 2024-12-17 RX ADMIN — FLUTICASONE PROPIONATE AND SALMETEROL XINAFOATE 1 DOSE(S): 45; 21 AEROSOL, METERED RESPIRATORY (INHALATION) at 05:35

## 2024-12-17 RX ADMIN — Medication 5 MILLIGRAM(S): at 12:21

## 2024-12-17 RX ADMIN — Medication 25 MILLIGRAM(S): at 15:18

## 2024-12-17 RX ADMIN — Medication 80 MILLIGRAM(S): at 12:20

## 2024-12-17 RX ADMIN — LEVETIRACETAM 1500 MILLIGRAM(S): 1000 TABLET ORAL at 05:32

## 2024-12-17 RX ADMIN — LACOSAMIDE 100 MILLIGRAM(S): 10 INJECTION INTRAVENOUS at 05:32

## 2024-12-17 RX ADMIN — LABETALOL 100 MILLIGRAM(S): 100 TABLET, FILM COATED ORAL at 14:58

## 2024-12-17 RX ADMIN — INSULIN GLARGINE 24 UNIT(S): 100 INJECTION, SOLUTION SUBCUTANEOUS at 09:06

## 2024-12-17 NOTE — PROGRESS NOTE ADULT - PROBLEM SELECTOR PLAN 2
Ohtuvayre inhalation suspension, breo ellipta, methylpred, albuterol, duonebs, montelukast at home  - C/w montelukast   - C/w advair, spiriva  - C/w methylpred 8mg bid  - incentive spirometer and acapella for airway clearance.
Ohtuvayre inhalation suspension, breo ellipta, methylpred, albuterol, duonebs, montelukast at home  - C/w montelukast   - C/w advair, spiriva  - C/w methylpred 8mg bid  - incentive spirometer and acapella for airway clearance.
Ohtuvayre inhalation suspension, breo ellipta, methylpred, albuterol, duonebs, montelukast at home  - C/w montelukast   - C/w advair, spiriva  - incentive spirometer and acapella for airway clearance.
Ohtuvayre inhalation suspension, breo ellipta, methylpred, albuterol, duonebs, montelukast at home  - C/w montelukast   - C/w advair, spiriva  - C/w methylpred 8mg bid  - incentive spirometer and acapella for airway clearance.
Ohtuvayre inhalation suspension, breo ellipta, methylpred, albuterol, duonebs, montelukast at home  - C/w montelukast   - C/w advair, spiriva  - C/w methylpred 8mg bid  - incentive spirometer and acapella for airway clearance.

## 2024-12-17 NOTE — PROGRESS NOTE ADULT - PROBLEM SELECTOR PLAN 1
Presenting with cough, green sputum production, SOB, subjective fevers; Hx of recurrent PNA, most recent admission 11/5-11-9/2024   - CXR showing bibasilar opacities with mild pleural effusion  - UA neg; MRSA PCR, Urine legionella, full RVP neg  - blood culture NGTD 24hr, sputum cx commensal val  - s/p azithro (12/12-12/14)    Plan:  - c/w Ertapenem 1g daily empirically with premedication with Benadryl (12/13-12/17) for 5 day course  - c/w chest PT/chest vest, airway clearance Presenting with cough, green sputum production, SOB, subjective fevers; Hx of recurrent PNA, most recent admission 11/5-11-9/2024   - CXR showing bibasilar opacities with mild pleural effusion  - UA neg; MRSA PCR, Urine legionella, full RVP neg  - blood culture NGTD 24hr, sputum cx commensal val  - s/p azithro (12/12-12/14)    Plan:  s/p Ertapenem 1g daily empirically with premedication with Benadryl (12/13-12/17) for 5 day course  - c/w chest PT/chest vest, airway clearance

## 2024-12-17 NOTE — PROGRESS NOTE ADULT - SUBJECTIVE AND OBJECTIVE BOX
**********************  Nadine Melgar MD  PGY-1, Internal Medicine  **********************  Patient is a 74y old  Female who presents with a chief complaint of PNA (16 Dec 2024 07:43)      OVERNIGHT EVENTS: No acute events.    SUBJECTIVE:  Patient seen and examined at bedside. Denies fever, chills, HA, cough, sob, chest pain, abdominal pain, dysuria, change in bowel movements.    OBJECTIVE:  Vital Signs Last 12 Hrs  T(F): 98.3 (12-17-24 @ 04:56), Max: 98.7 (12-16-24 @ 21:35)  HR: 57 (12-17-24 @ 04:56) (57 - 83)  BP: 174/70 (12-17-24 @ 04:56) (143/63 - 174/70)  BP(mean): --  RR: 18 (12-17-24 @ 04:56) (18 - 18)  SpO2: 92% (12-17-24 @ 04:56) (92% - 95%)  I&O's Summary    16 Dec 2024 07:01  -  17 Dec 2024 07:00  --------------------------------------------------------  IN: 370 mL / OUT: 1375 mL / NET: -1005 mL        General: Patient in NAD  HEENT: NCAT, EOMI, no oral lesions  CV: S1/S2, RRR   Lungs: No respiratory distress, CTAB  Abd: Soft, nontender, no guarding, no rebound tenderness, + bowel sounds   : No suprapubic tenderness  Neuro: Alert and oriented to time, place and person. No focal deficits noted.   Ext: No cyanosis, no edema  Skin: No rash, no phlebitis    LABS:                        11.4   10.77 )-----------( 386      ( 16 Dec 2024 09:55 )             38.8     12-16    142  |  104  |  13  ----------------------------<  88  4.5   |  24  |  0.70    Ca    8.8      16 Dec 2024 09:55  Phos  2.9     12-16  Mg     2.2     12-16    TPro  6.2  /  Alb  3.8  /  TBili  0.2  /  DBili  x   /  AST  23  /  ALT  23  /  AlkPhos  91  12-16      Urinalysis Basic - ( 16 Dec 2024 09:55 )    Color: x / Appearance: x / SG: x / pH: x  Gluc: 88 mg/dL / Ketone: x  / Bili: x / Urobili: x   Blood: x / Protein: x / Nitrite: x   Leuk Esterase: x / RBC: x / WBC x   Sq Epi: x / Non Sq Epi: x / Bacteria: x        Culture - Sputum (collected 14 Dec 2024 21:45)  Source: .Sputum Sputum  Gram Stain (15 Dec 2024 06:27):    Few polymorphonuclear leukocytes per low power field    Few Squamous epithelial cells per low power field    Few Gram Positive Rods seen per oil power field    Few Yeast like cells seen per oil power field  Final Report (16 Dec 2024 12:20):    Commensal val consistent with body site    Culture - Blood (collected 12 Dec 2024 15:25)  Source: .Blood BLOOD  Preliminary Report (16 Dec 2024 19:01):    No growth at 4 days    Culture - Blood (collected 12 Dec 2024 15:10)  Source: .Blood BLOOD  Preliminary Report (16 Dec 2024 19:01):    No growth at 4 days        RADIOLOGY & ADDITIONAL TESTS: Reviewed.    MEDICATIONS:  MEDICATIONS  (STANDING):  apixaban 5 milliGRAM(s) Oral every 12 hours  bisacodyl 5 milliGRAM(s) Oral daily  chlorhexidine 4% Liquid 1 Application(s) Topical daily  clopidogrel Tablet 75 milliGRAM(s) Oral daily  dextrose 5%. 1000 milliLiter(s) (100 mL/Hr) IV Continuous <Continuous>  dextrose 5%. 1000 milliLiter(s) (50 mL/Hr) IV Continuous <Continuous>  dextrose 50% Injectable 25 Gram(s) IV Push once  dextrose 50% Injectable 12.5 Gram(s) IV Push once  dextrose 50% Injectable 25 Gram(s) IV Push once  dextrose Oral Gel 15 Gram(s) Oral once  ertapenem  IVPB 1000 milliGRAM(s) IV Intermittent every 24 hours  fluticasone propionate/ salmeterol 250-50 MICROgram(s) Diskus 1 Dose(s) Inhalation two times a day  glucagon  Injectable 1 milliGRAM(s) IntraMuscular once  insulin glargine Injectable (LANTUS) 24 Unit(s) SubCutaneous before breakfast  insulin glargine Injectable (LANTUS) 14 Unit(s) SubCutaneous at bedtime  insulin lispro (ADMELOG) corrective regimen sliding scale   SubCutaneous three times a day before meals  insulin lispro (ADMELOG) corrective regimen sliding scale   SubCutaneous at bedtime  labetalol 100 milliGRAM(s) Oral every 8 hours  lacosamide 100 milliGRAM(s) Oral two times a day  levETIRAcetam 1500 milliGRAM(s) Oral two times a day  methylPREDNISolone 8 milliGRAM(s) Oral two times a day  mexiletine 200 milliGRAM(s) Oral two times a day  montelukast 10 milliGRAM(s) Oral daily  multivitamin 1 Tablet(s) Oral daily  NIFEdipine XL 30 milliGRAM(s) Oral daily  pantoprazole    Tablet 40 milliGRAM(s) Oral before breakfast  polyethylene glycol 3350 17 Gram(s) Oral two times a day  rosuvastatin 5 milliGRAM(s) Oral at bedtime  senna 2 Tablet(s) Oral at bedtime  sertraline 50 milliGRAM(s) Oral daily  simethicone 80 milliGRAM(s) Chew daily  sodium chloride 7% Inhalation 4 milliLiter(s) Inhalation every 12 hours  tiotropium 2.5 MICROgram(s) Inhaler 2 Puff(s) Inhalation daily    MEDICATIONS  (PRN):  albuterol    90 MICROgram(s) HFA Inhaler 2 Puff(s) Inhalation every 6 hours PRN for shortness of breath and/or wheezing  aluminum hydroxide/magnesium hydroxide/simethicone Suspension 30 milliLiter(s) Oral every 6 hours PRN Dyspepsia  diphenhydrAMINE Injectable 25 milliGRAM(s) IV Push daily PRN For IV Ertapenem   **********************  Nadine Melgar MD  PGY-1, Internal Medicine  **********************  Patient is a 74y old  Female who presents with a chief complaint of PNA (16 Dec 2024 07:43)      OVERNIGHT EVENTS: No acute events.    SUBJECTIVE:  Patient seen and examined at bedside. Feels well this AM, with continued stable mildly productive cough. Otherwise denies fever, chills, HA, sob, chest pain, abdominal pain, dysuria. Excited to go home.    OBJECTIVE:  Vital Signs Last 12 Hrs  T(F): 98.3 (12-17-24 @ 04:56), Max: 98.7 (12-16-24 @ 21:35)  HR: 57 (12-17-24 @ 04:56) (57 - 83)  BP: 174/70 (12-17-24 @ 04:56) (143/63 - 174/70)  BP(mean): --  RR: 18 (12-17-24 @ 04:56) (18 - 18)  SpO2: 92% (12-17-24 @ 04:56) (92% - 95%)  I&O's Summary    16 Dec 2024 07:01  -  17 Dec 2024 07:00  --------------------------------------------------------  IN: 370 mL / OUT: 1375 mL / NET: -1005 mL        General: Patient in NAD  HEENT: NCAT, EOMI, no oral lesions  CV: S1/S2, RRR   Lungs: No respiratory distress +wet cough, minimal RLL rhonchi  Abd: Soft, nontender, no guarding, no rebound tenderness, + bowel sounds   : No suprapubic tenderness  Neuro: Alert and oriented to time, place and person. No focal deficits noted.   Ext: No cyanosis, no edema  Skin: No rash, no phlebitis    LABS:                        11.4   10.77 )-----------( 386      ( 16 Dec 2024 09:55 )             38.8     12-16    142  |  104  |  13  ----------------------------<  88  4.5   |  24  |  0.70    Ca    8.8      16 Dec 2024 09:55  Phos  2.9     12-16  Mg     2.2     12-16    TPro  6.2  /  Alb  3.8  /  TBili  0.2  /  DBili  x   /  AST  23  /  ALT  23  /  AlkPhos  91  12-16      Urinalysis Basic - ( 16 Dec 2024 09:55 )    Color: x / Appearance: x / SG: x / pH: x  Gluc: 88 mg/dL / Ketone: x  / Bili: x / Urobili: x   Blood: x / Protein: x / Nitrite: x   Leuk Esterase: x / RBC: x / WBC x   Sq Epi: x / Non Sq Epi: x / Bacteria: x        Culture - Sputum (collected 14 Dec 2024 21:45)  Source: .Sputum Sputum  Gram Stain (15 Dec 2024 06:27):    Few polymorphonuclear leukocytes per low power field    Few Squamous epithelial cells per low power field    Few Gram Positive Rods seen per oil power field    Few Yeast like cells seen per oil power field  Final Report (16 Dec 2024 12:20):    Commensal val consistent with body site    Culture - Blood (collected 12 Dec 2024 15:25)  Source: .Blood BLOOD  Preliminary Report (16 Dec 2024 19:01):    No growth at 4 days    Culture - Blood (collected 12 Dec 2024 15:10)  Source: .Blood BLOOD  Preliminary Report (16 Dec 2024 19:01):    No growth at 4 days        RADIOLOGY & ADDITIONAL TESTS: Reviewed.    MEDICATIONS:  MEDICATIONS  (STANDING):  apixaban 5 milliGRAM(s) Oral every 12 hours  bisacodyl 5 milliGRAM(s) Oral daily  chlorhexidine 4% Liquid 1 Application(s) Topical daily  clopidogrel Tablet 75 milliGRAM(s) Oral daily  dextrose 5%. 1000 milliLiter(s) (100 mL/Hr) IV Continuous <Continuous>  dextrose 5%. 1000 milliLiter(s) (50 mL/Hr) IV Continuous <Continuous>  dextrose 50% Injectable 25 Gram(s) IV Push once  dextrose 50% Injectable 12.5 Gram(s) IV Push once  dextrose 50% Injectable 25 Gram(s) IV Push once  dextrose Oral Gel 15 Gram(s) Oral once  ertapenem  IVPB 1000 milliGRAM(s) IV Intermittent every 24 hours  fluticasone propionate/ salmeterol 250-50 MICROgram(s) Diskus 1 Dose(s) Inhalation two times a day  glucagon  Injectable 1 milliGRAM(s) IntraMuscular once  insulin glargine Injectable (LANTUS) 24 Unit(s) SubCutaneous before breakfast  insulin glargine Injectable (LANTUS) 14 Unit(s) SubCutaneous at bedtime  insulin lispro (ADMELOG) corrective regimen sliding scale   SubCutaneous three times a day before meals  insulin lispro (ADMELOG) corrective regimen sliding scale   SubCutaneous at bedtime  labetalol 100 milliGRAM(s) Oral every 8 hours  lacosamide 100 milliGRAM(s) Oral two times a day  levETIRAcetam 1500 milliGRAM(s) Oral two times a day  methylPREDNISolone 8 milliGRAM(s) Oral two times a day  mexiletine 200 milliGRAM(s) Oral two times a day  montelukast 10 milliGRAM(s) Oral daily  multivitamin 1 Tablet(s) Oral daily  NIFEdipine XL 30 milliGRAM(s) Oral daily  pantoprazole    Tablet 40 milliGRAM(s) Oral before breakfast  polyethylene glycol 3350 17 Gram(s) Oral two times a day  rosuvastatin 5 milliGRAM(s) Oral at bedtime  senna 2 Tablet(s) Oral at bedtime  sertraline 50 milliGRAM(s) Oral daily  simethicone 80 milliGRAM(s) Chew daily  sodium chloride 7% Inhalation 4 milliLiter(s) Inhalation every 12 hours  tiotropium 2.5 MICROgram(s) Inhaler 2 Puff(s) Inhalation daily    MEDICATIONS  (PRN):  albuterol    90 MICROgram(s) HFA Inhaler 2 Puff(s) Inhalation every 6 hours PRN for shortness of breath and/or wheezing  aluminum hydroxide/magnesium hydroxide/simethicone Suspension 30 milliLiter(s) Oral every 6 hours PRN Dyspepsia  diphenhydrAMINE Injectable 25 milliGRAM(s) IV Push daily PRN For IV Ertapenem

## 2024-12-17 NOTE — PROGRESS NOTE ADULT - ASSESSMENT
74F hx type a aortic dissection s/p repair, aortic stenosis s/p TAVR in 2023, asthma on chronic steroids, bronchiectasis, pneumonia, tracheomalacia s/p tracheoplasty, T2DM, A-fib on Eliquis, colorectal cancer status post colostomy, seizures, HTN, HLD admitted for pneumonia, pending  completion of ertapenem course today 74F hx type a aortic dissection s/p repair, aortic stenosis s/p TAVR in 2023, asthma on chronic steroids, bronchiectasis, pneumonia, tracheomalacia s/p tracheoplasty, T2DM, A-fib on Eliquis, colorectal cancer status post colostomy, seizures, HTN, HLD admitted for pneumonia, stable for DC following completion of ertapenem course today

## 2024-12-17 NOTE — PROGRESS NOTE ADULT - PROBLEM SELECTOR PLAN 3
- C/w home nifedipine and labetalol   - C/w clopidogrel
- C/w home nifedipine and labetalol   - C/w clopidogrel
- C/w home nifedipine and labetalol   - C/w clopidogrel.
- C/w home nifedipine and labetalol   - C/w clopidogrel
- C/w home nifedipine and labetalol   - C/w clopidogrel

## 2024-12-17 NOTE — PROGRESS NOTE ADULT - PROBLEM SELECTOR PROBLEM 3
H/O aortic dissection

## 2024-12-17 NOTE — PROGRESS NOTE ADULT - PROBLEM SELECTOR PLAN 4
- C/w lacosamide  - C/w keppra

## 2024-12-17 NOTE — PROGRESS NOTE ADULT - ATTENDING COMMENTS
74F PMHx Type A aortic dissection s/p repair, AS s/p TAVR, severe asthma on chronic steroids, COPD-bronchiectasis subtype, tracheomalacia s/p tracheoplasty, T2DM, paroxysmal atrial fibrillation on Eliquis, seizures, HTN, HLD and colorectal cancer s/p colostomy who presents for SOB and worsening productive cough with green sputum. Was admitted here from 11/5-9 for similar symptoms, was given IV Ertapenem and solumedrol and discharged home. This has been a recurring presentation for the patient. She saw Dr. Allison on 12/9 since her symptoms flared up again. CXR showing mild hazy opacities of both costophrenic angles that could represent atelectasis vs pleural effusions. Admitted for AHRF that is likely multifactorial in setting of severe asthma, bronchiectasis, and tracheomalacia with possible PNA/bronchitis.    #AHRF  #COPD - Bronchiectasis subtype  #Severe persistent asthma dependent on chronic steroids  #T2DM  #Paroxysmal atrial fibrillation on Eliquis  #Seizure disorder  #Type A aortic dissection s/p repair  #AS s/p TAVR    We d/w Dr. Allison 12/13. He does not feel strongly that patient will benefit from a pulmonary standpoint in the hospital, defers abx management to us. Leukocytosis improved w/ ertapenem. Cultures pending and has grown ESBL in the past. Prior ID notes reviewed, no great option besides ertapenem. Favor 5 day course for PNA. With clinical improvement- now off NC.   Inpatient pulm c/s appreciated - agrees with course of abx.    Documented itchiness to ertapenem in the past, no anaphylaxis. takes benadryl prior without any problems   Midline placed 12/13. Send script for home infusion set up   Now on RA. Has COPD hx, no need to target higher than 92% oxygen sat  c/w home steroid regimen   c/w home inhalers   Mild leukocytosis noted, afebrile, afebrile. monitor     Dispo pending iv abx set up, last day 12/17    d/w T2 .
74F PMHx Type A aortic dissection s/p repair, AS s/p TAVR, severe asthma on chronic steroids, COPD-bronchiectasis subtype, tracheomalacia s/p tracheoplasty, T2DM, paroxysmal atrial fibrillation on Eliquis, seizures, HTN, HLD and colorectal cancer s/p colostomy who presents for SOB and worsening productive cough with green sputum. Was admitted here from 11/5-9 for similar symptoms, was given IV Ertapenem and solumedrol and discharged home. This has been a recurring presentation for the patient. She saw Dr. Allison on 12/9 since her symptoms flared up again. CXR showing mild hazy opacities of both costophrenic angles that could represent atelectasis vs pleural effusions. Admitted for AHRF that is likely multifactorial in setting of severe asthma, bronchiectasis, and tracheomalacia with possible PNA/bronchitis.    #AHRF  #COPD - Bronchiectasis subtype  #Severe persistent asthma dependent on chronic steroids  #T2DM  #Paroxysmal atrial fibrillation on Eliquis  #Seizure disorder  #Type A aortic dissection s/p repair  #AS s/p TAVR    We d/w Dr. Allison 12/13. He does not feel strongly that patient will benefit from a pulmonary standpoint in the hospital, defers abx management to us. Leukocytosis improved w/ ertapenem. Cultures pending and has grown ESBL in the past. Prior ID notes reviewed, no great option besides ertapenem. Favor 5 day course for PNA. With clinical improvement- now off NC.   Inpatient pulm c/s appreciated - agrees with course of abx.    Documented itchiness to ertapenem in the past, no anaphylaxis. takes benadryl prior without any problems   Midline placed 12/13. Send script for home infusion set up   Now 93% on RA. Has COPD hx, no need to target higher than 92% oxygen sat  c/w home steroid regimen   c/w home inhalers     Dispo pending iv abx set up (today day 2/5 of abx). If patient elects to sign out AMA she has capacity and good f/u with Dr. Allison.     d/w T2 .
74F hx type a aortic dissection s/p repair, aortic stenosis s/p TAVR in 2023, asthma on chronic steroids, bronchiectasis, pneumonia, tracheomalacia s/p tracheoplasty, T2DM, A-fib on Eliquis, colorectal cancer status post colostomy, seizures, HTN, HLD admitted for pneumonia, now with completion of ertapenem course 12/17     Feels much improved today, ready to go home, planning for 2pm w transportation per CM. Patient received last dose of ertapenem today 6am. Has appt with Her pulm Dr. Allison on 12/27, placed in DC summary information. She reports urinating well, eating well. No CP or SOB.     Pneumonia: S/p ertapenem course, WBC normalized, ok for DC w pulm f/u as above.     Asthma: C/w Home meds    Seizure: C/w keppra and vimpat.     Dm: c/w insulin lantus 24 in PM and 14 in AM. Corrective admelog On DC can resume her home regimen of 30 basaglar in AM and 18 in evening. Can f/u with her PCP after DC.     AF: On home meds eliquis, labetalol and mexiletine (200 BID).     D/w HS Team 2    Alexei Nolasco MD  Division of Hospital Medicine .
74F PMHx Type A aortic dissection s/p repair, AS s/p TAVR, severe asthma on chronic steroids, COPD-bronchiectasis subtype, tracheomalacia s/p tracheoplasty, T2DM, paroxysmal atrial fibrillation on Eliquis, seizures, HTN, HLD and colorectal cancer s/p colostomy who presents for SOB and worsening productive cough with green sputum. Was admitted here from 11/5-9 for similar symptoms, was given IV Ertapenem and solumedrol and discharged home. This has been a recurring presentation for the patient. She saw Dr. Allison on 12/9 since her symptoms flared up again. CXR showing mild hazy opacities of both costophrenic angles that could represent atelectasis vs pleural effusions. Admitted for AHRF that is likely multifactorial in setting of severe asthma, bronchiectasis, and tracheomalacia with possible PNA/bronchitis.    #AHRF  #COPD - Bronchiectasis subtype  #Severe persistent asthma dependent on chronic steroids  #T2DM  #Paroxysmal atrial fibrillation on Eliquis  #Seizure disorder  #Type A aortic dissection s/p repair  #AS s/p TAVR    We d/w Dr. Allison. He does not feel strongly that patient will benefit from a pulmonary standpoint in the hospital, defers abx management to us. Leukocytosis improved w/ ertapenem. Cultures pending and has grown ESBL in the past. Prior ID notes reviewed, no great option besides ertapenem. Favor 5 day course for PNA. With clinical improvement- now off NC.     Documented itchiness to ertapenem in the past, no anaphylaxis. takes benadryl prior without any problems   Place midline, patient would like to complete course at home   Now 93% on RA. Has COPD hx, no need to target higher than 92% oxygen sat  Switch steroids back to home regimen   c/w home inhalers     d/w T2
74F hx type a aortic dissection s/p repair, aortic stenosis s/p TAVR in 2023, asthma on chronic steroids, bronchiectasis, pneumonia, tracheomalacia s/p tracheoplasty, T2DM, A-fib on Eliquis, colorectal cancer status post colostomy, seizures, HTN, HLD admitted for pneumonia, pending  completion of ertapenem course 12/17     Feels well today. Reports SOB improved, still w occasional productive cough. Slightly tender legs to palpation.    Pneumonia: Finishing ertapenem course 12/17 AM. Likely OK for DC afterwards. Lives with daughter. BCx negative. PT no skilled PT needs. Outpatient f/u with Dr. Keegan bear.    Asthma: C/w Home meds    Seizure: C/w keppra and vimpat.     Dm: c/w insulin lantus 24 in PM and 14 in AM. Corrective admelog    D/w HS Team 2    Alexei Nolasco MD  Division of Hospital Medicine

## 2024-12-17 NOTE — DISCHARGE NOTE NURSING/CASE MANAGEMENT/SOCIAL WORK - PATIENT PORTAL LINK FT
You can access the FollowMyHealth Patient Portal offered by [facility] by registering at the following website: [email or web address]. By joining [employer]’s FollowMyHealth portal, you will also be able to view your health information using other applications (apps) compatible with our system.

## 2024-12-17 NOTE — PROGRESS NOTE ADULT - PROBLEM SELECTOR PLAN 7
DVT PPx: eliquis  Diet: DASH  BM regimen: Senna, miralax BID, Dulcolax   Dispo: Home, 12/17, [facility] patient   PCP: Bon [place]uels  Pharmacy: [address]

## 2024-12-17 NOTE — PROGRESS NOTE ADULT - PROBLEM SELECTOR PLAN 5
On basaglar 30 qd in AM and 18 qhs  - C/w lantus 24u qd in the AM and 14U before bedtime  - SSI premeal On basaglar 30 qd in AM and 18 qhs  - C/w lantus 24u qd in the AM and 14U before bedtime  - SSI premeal  - on DC, continue home insulin regimen

## 2024-12-17 NOTE — DISCHARGE NOTE NURSING/CASE MANAGEMENT/SOCIAL WORK - NSDCFUADDAPPT_GEN_ALL_CORE_FT
APPTS ARE READY TO BE MADE: [X] YES    Best Family or Patient Contact (if needed): Self    Additional Information about above appointments (if needed):    1: PCP  2: [facility] - Dr. Allison    Other comments or requests:

## 2024-12-17 NOTE — DISCHARGE NOTE NURSING/CASE MANAGEMENT/SOCIAL WORK - NSDCVIVACCINE_GEN_ALL_CORE_FT
COVID-19, mRNA, LNP-S, PF, 100 mcg/ 0.5 mL dose (Moderna); 06-Dec-2021 17:12; Afshan Lew (RADHA); [employer] [id number] (Exp. Date: 22-Dec-2021); IntraMuscular; Deltoid Left.; 0.25 milliLiter(s);

## 2024-12-20 ENCOUNTER — APPOINTMENT (OUTPATIENT)
Dept: PULMONOLOGY | Facility: CLINIC | Age: 74
End: 2024-12-20
Payer: MEDICARE

## 2024-12-20 VITALS
WEIGHT: 155 LBS | HEART RATE: 59 BPM | RESPIRATION RATE: 16 BRPM | SYSTOLIC BLOOD PRESSURE: 136 MMHG | HEIGHT: 67 IN | DIASTOLIC BLOOD PRESSURE: 72 MMHG | OXYGEN SATURATION: 95 % | TEMPERATURE: 95.5 F | BODY MASS INDEX: 24.33 KG/M2

## 2024-12-20 DIAGNOSIS — J44.9 CHRONIC OBSTRUCTIVE PULMONARY DISEASE, UNSPECIFIED: ICD-10-CM

## 2024-12-20 DIAGNOSIS — K21.9 GASTRO-ESOPHAGEAL REFLUX DISEASE W/OUT ESOPHAGITIS: ICD-10-CM

## 2024-12-20 DIAGNOSIS — J39.8 OTHER SPECIFIED DISEASES OF UPPER RESPIRATORY TRACT: ICD-10-CM

## 2024-12-20 DIAGNOSIS — J45.909 UNSPECIFIED ASTHMA, UNCOMPLICATED: ICD-10-CM

## 2024-12-20 DIAGNOSIS — R93.89 ABNORMAL FINDINGS ON DIAGNOSTIC IMAGING OF OTHER SPECIFIED BODY STRUCTURES: ICD-10-CM

## 2024-12-20 DIAGNOSIS — R06.02 SHORTNESS OF BREATH: ICD-10-CM

## 2024-12-20 DIAGNOSIS — J45.50 SEVERE PERSISTENT ASTHMA, UNCOMPLICATED: ICD-10-CM

## 2024-12-20 DIAGNOSIS — D84.9 IMMUNODEFICIENCY, UNSPECIFIED: ICD-10-CM

## 2024-12-20 PROCEDURE — 99214 OFFICE O/P EST MOD 30 MIN: CPT

## 2024-12-21 LAB
CULTURE RESULTS: SIGNIFICANT CHANGE UP
SPECIMEN SOURCE: SIGNIFICANT CHANGE UP

## 2024-12-25 ENCOUNTER — NON-APPOINTMENT (OUTPATIENT)
Age: 74
End: 2024-12-25

## 2024-12-27 ENCOUNTER — APPOINTMENT (OUTPATIENT)
Dept: PULMONOLOGY | Facility: CLINIC | Age: 74
End: 2024-12-27
Payer: MEDICARE

## 2024-12-27 VITALS
DIASTOLIC BLOOD PRESSURE: 68 MMHG | OXYGEN SATURATION: 96 % | BODY MASS INDEX: 24.33 KG/M2 | HEIGHT: 67 IN | SYSTOLIC BLOOD PRESSURE: 126 MMHG | WEIGHT: 155 LBS | RESPIRATION RATE: 17 BRPM | HEART RATE: 70 BPM | TEMPERATURE: 97.2 F

## 2024-12-27 PROCEDURE — 96372 THER/PROPH/DIAG INJ SC/IM: CPT

## 2024-12-27 RX ORDER — TEZEPELUMAB-EKKO 210 MG/1.9ML
210 INJECTION, SOLUTION SUBCUTANEOUS
Qty: 0 | Refills: 0 | Status: COMPLETED | OUTPATIENT
Start: 2024-12-26

## 2024-12-27 NOTE — DIETITIAN INITIAL EVALUATION ADULT - MALNUTRITION
[facility]  PHYSICAL THERAPY  [] EVALUATION  [] DAILY NOTE (LAND) [] DAILY NOTE (AQUATIC ) [x] PROGRESS NOTE [] DISCHARGE NOTE    [x] OUTPATIENT REHABILITATION CENTER [facility]   [] [facility] [place]    [] [facility]   [] [facility]    Date: 2024  Patient Name:  Yulissa Andersen  : 1979  MRN: [id number]  CSN: [id number]    Referring Practitioner Noa Leal DO [id number]      Diagnosis  Diagnoses       I63.9 (ICD-10-CM) - Cerebral infarction, unspecified           Treatment Diagnosis R26.0  Ataxic Gait  R26.89  Abnormalities of gait and mobility  R26.81  Unsteadiness on feet  R27.9  Unspecified lack of coordination  G81.90 Hemiplegia Affecting Unspecified Side  M62.81 Generalized Weakness   Date of Evaluation 24   Additional Pertinent History Yulissa Andersen has a past medical history of Coronary artery disease involving native coronary artery of native heart without angina pectoris, Diabetes mellitus (HCC), Hypertension, Incomplete bladder emptying, Microscopic hematuria, Osteoarthritis of knee, and Unspecified cerebral artery occlusion with cerebral infarction.  she has a past surgical history that includes cyst removal; hysteroscopy; Hysterectomy (2013); Breast surgery (Left, 02/10/2015); pr exc b9 lesion mrgn xcp sk tg t/a/l 2.1-3.0 cm (N/A, 2018); and DISSECTION GROIN (N/A, 2019).     Allergies Allergies   Allergen Reactions    Latex Hives    Ace Inhibitors Angioedema    Bactrim [Sulfamethoxazole-Trimethoprim] Other (See Comments)     Caused acute allergic interstitial nephritis and acute kidney injury in 2015.  Marcelo Gunderson, DO    Penicillins Hives    Clindamycin/Lincomycin Rash    Ciprofloxacin Hives    Doxycycline Hives    Lisinopril      Attacked kidneys      Medications   Current Outpatient Medications:     apixaban (ELIQUIS) 5 MG TABS tablet, Take 1 tablet by mouth 2 times daily, Disp: 60 tablet, Rfl: 1    
  [facility]  PHYSICAL THERAPY  [] EVALUATION  [] DAILY NOTE (LAND) [] DAILY NOTE (AQUATIC ) [x] PROGRESS NOTE [] DISCHARGE NOTE    [x] OUTPATIENT REHABILITATION CENTER [facility]   [] [place] [facility] [place]    [] [facility]   [] [facility]    Date: 2024  Patient Name:  Yulissa Andersen  : 1979  MRN: [id number]  CSN: [id number]    Referring Practitioner Noa Leal DO [id number]      Diagnosis  Diagnoses       I63.9 (ICD-10-CM) - Cerebral infarction, unspecified           Treatment Diagnosis R26.0  Ataxic Gait  R26.89  Abnormalities of gait and mobility  R26.81  Unsteadiness on feet  R27.9  Unspecified lack of coordination  G81.90 Hemiplegia Affecting Unspecified Side  M62.81 Generalized Weakness   Date of Evaluation 24   Additional Pertinent History Yulissa Andersen has a past medical history of Coronary artery disease involving native coronary artery of native heart without angina pectoris, Diabetes mellitus (HCC), Hypertension, Incomplete bladder emptying, Microscopic hematuria, Osteoarthritis of knee, and Unspecified cerebral artery occlusion with cerebral infarction.  she has a past surgical history that includes cyst removal; hysteroscopy; Hysterectomy (2013); Breast surgery (Left, 02/10/2015); pr exc b9 lesion mrgn xcp sk tg t/a/l 2.1-3.0 cm (N/A, 2018); and DISSECTION GROIN (N/A, 2019).     Allergies Allergies   Allergen Reactions    Latex Hives    Ace Inhibitors Angioedema    Bactrim [Sulfamethoxazole-Trimethoprim] Other (See Comments)     Caused acute allergic interstitial nephritis and acute kidney injury in 2015.  Marcelo Gunderson, DO    Penicillins Hives    Clindamycin/Lincomycin Rash    Ciprofloxacin Hives    Doxycycline Hives    Lisinopril      Attacked kidneys      Medications   Current Outpatient Medications:     apixaban (ELIQUIS) 5 MG TABS tablet, Take 1 tablet by mouth 2 times daily, Disp: 60 tablet, Rfl: 1    
Pt meets criteria for severe malnutrition in the context of acute illness

## 2025-01-02 ENCOUNTER — EMERGENCY (EMERGENCY)
Facility: HOSPITAL | Age: 75
LOS: 1 days | Discharge: HOME | End: 2025-01-02
Attending: EMERGENCY MEDICINE | Admitting: EMERGENCY MEDICINE
Payer: MEDICARE

## 2025-01-02 ENCOUNTER — OUTPATIENT (OUTPATIENT)
Dept: OUTPATIENT SERVICES | Facility: HOSPITAL | Age: 75
LOS: 1 days | End: 2025-01-02
Payer: MEDICARE

## 2025-01-02 ENCOUNTER — OUTPATIENT (OUTPATIENT)
Dept: OUTPATIENT SERVICES | Facility: HOSPITAL | Age: 75
LOS: 1 days | End: 2025-01-02

## 2025-01-02 ENCOUNTER — APPOINTMENT (OUTPATIENT)
Dept: INTERNAL MEDICINE | Facility: CLINIC | Age: 75
End: 2025-01-02
Payer: MEDICARE

## 2025-01-02 ENCOUNTER — EMERGENCY (EMERGENCY)
Facility: HOSPITAL | Age: 75
LOS: 1 days | End: 2025-01-02
Admitting: EMERGENCY MEDICINE
Payer: MEDICARE

## 2025-01-02 VITALS
HEART RATE: 73 BPM | HEIGHT: 67 IN | OXYGEN SATURATION: 95 % | TEMPERATURE: 98 F | DIASTOLIC BLOOD PRESSURE: 72 MMHG | WEIGHT: 153 LBS | SYSTOLIC BLOOD PRESSURE: 122 MMHG | RESPIRATION RATE: 18 BRPM

## 2025-01-02 VITALS
HEART RATE: 70 BPM | WEIGHT: 154.98 LBS | TEMPERATURE: 98 F | HEIGHT: 67 IN | SYSTOLIC BLOOD PRESSURE: 144 MMHG | OXYGEN SATURATION: 96 % | DIASTOLIC BLOOD PRESSURE: 70 MMHG | RESPIRATION RATE: 19 BRPM

## 2025-01-02 VITALS
DIASTOLIC BLOOD PRESSURE: 62 MMHG | BODY MASS INDEX: 24.33 KG/M2 | HEART RATE: 69 BPM | WEIGHT: 155 LBS | OXYGEN SATURATION: 91 % | HEIGHT: 67 IN | TEMPERATURE: 98.3 F | SYSTOLIC BLOOD PRESSURE: 110 MMHG

## 2025-01-02 DIAGNOSIS — C21.0 MALIGNANT NEOPLASM OF ANUS, UNSPECIFIED: ICD-10-CM

## 2025-01-02 DIAGNOSIS — Z98.89 OTHER SPECIFIED POSTPROCEDURAL STATES: Chronic | ICD-10-CM

## 2025-01-02 DIAGNOSIS — R26.89 OTHER ABNORMALITIES OF GAIT AND MOBILITY: ICD-10-CM

## 2025-01-02 DIAGNOSIS — Z98.890 OTHER SPECIFIED POSTPROCEDURAL STATES: Chronic | ICD-10-CM

## 2025-01-02 DIAGNOSIS — K62.5 HEMORRHAGE OF ANUS AND RECTUM: Chronic | ICD-10-CM

## 2025-01-02 DIAGNOSIS — H05.20 UNSPECIFIED EXOPHTHALMOS: ICD-10-CM

## 2025-01-02 DIAGNOSIS — Z96.653 PRESENCE OF ARTIFICIAL KNEE JOINT, BILATERAL: Chronic | ICD-10-CM

## 2025-01-02 DIAGNOSIS — I71.012 DISSECTION OF DESCENDING THORACIC AORTA: ICD-10-CM

## 2025-01-02 DIAGNOSIS — Z95.2 PRESENCE OF PROSTHETIC HEART VALVE: Chronic | ICD-10-CM

## 2025-01-02 DIAGNOSIS — G40.909 EPILEPSY, UNSPECIFIED, NOT INTRACTABLE, W/OUT STATUS EPILEPTICUS: ICD-10-CM

## 2025-01-02 DIAGNOSIS — I48.91 UNSPECIFIED ATRIAL FIBRILLATION: ICD-10-CM

## 2025-01-02 DIAGNOSIS — I10 ESSENTIAL (PRIMARY) HYPERTENSION: ICD-10-CM

## 2025-01-02 DIAGNOSIS — H05.352 EXOSTOSIS OF LEFT ORBIT: Chronic | ICD-10-CM

## 2025-01-02 DIAGNOSIS — H54.7 UNSPECIFIED VISUAL LOSS: ICD-10-CM

## 2025-01-02 DIAGNOSIS — M79.673 PAIN IN UNSPECIFIED FOOT: ICD-10-CM

## 2025-01-02 DIAGNOSIS — Z87.09 PERSONAL HISTORY OF OTHER DISEASES OF THE RESPIRATORY SYSTEM: Chronic | ICD-10-CM

## 2025-01-02 DIAGNOSIS — I71.9 AORTIC ANEURYSM OF UNSPECIFIED SITE, W/OUT RUPTURE: ICD-10-CM

## 2025-01-02 DIAGNOSIS — J44.9 CHRONIC OBSTRUCTIVE PULMONARY DISEASE, UNSPECIFIED: ICD-10-CM

## 2025-01-02 DIAGNOSIS — J18.9 PNEUMONIA, UNSPECIFIED ORGANISM: ICD-10-CM

## 2025-01-02 PROCEDURE — L9991: CPT

## 2025-01-02 PROCEDURE — 99214 OFFICE O/P EST MOD 30 MIN: CPT

## 2025-01-02 PROCEDURE — G0463: CPT

## 2025-01-02 PROCEDURE — G2211 COMPLEX E/M VISIT ADD ON: CPT

## 2025-01-02 RX ORDER — GABAPENTIN 300 MG/1
100 CAPSULE ORAL ONCE
Refills: 0 | Status: COMPLETED | OUTPATIENT
Start: 2025-01-02 | End: 2025-01-02

## 2025-01-02 RX ORDER — LABETALOL HYDROCHLORIDE 100 MG/1
100 TABLET, FILM COATED ORAL EVERY 8 HOURS
Qty: 90 | Refills: 3 | Status: ACTIVE | COMMUNITY
Start: 2025-01-02 | End: 1900-01-01

## 2025-01-02 RX ORDER — MEXILETINE HYDROCHLORIDE 200 MG/1
200 CAPSULE ORAL
Qty: 180 | Refills: 3 | Status: ACTIVE | COMMUNITY
Start: 2025-01-02 | End: 1900-01-01

## 2025-01-02 RX ORDER — TRAMADOL HYDROCHLORIDE 50 MG/1
50 TABLET ORAL ONCE
Refills: 0 | Status: DISCONTINUED | OUTPATIENT
Start: 2025-01-02 | End: 2025-01-02

## 2025-01-02 RX ADMIN — GABAPENTIN 100 MILLIGRAM(S): 300 CAPSULE ORAL at 23:16

## 2025-01-02 RX ADMIN — TRAMADOL HYDROCHLORIDE 50 MILLIGRAM(S): 50 TABLET ORAL at 23:16

## 2025-01-02 NOTE — ED ADULT TRIAGE NOTE - HEIGHT IN INCHES
Patient Information     Patient Name MRN Dajuan Griggs [id number] Male 1945      Telephone Encounter by Alanna Banerjee RN at 2017  4:42 PM     Author:  Alanna Banerjee RN Service:  (none) Author Type:  NURS- Registered Nurse     Filed:  2017  4:42 PM Encounter Date:  2017 Status:  Signed     :  Alanna Banerjee RN (NURS- Registered Nurse)            Biguanides    Office visit in the past 12 months or per provider note.    Last visit with DONNELL BEE was on: 2017 in [facility]  Next visit with DONNELL BEE is on: No future appointment listed with this provider  Next visit with Internal Medicine is on: No future appointment listed in this department    Lab test requirements:  HgbA1c annually or per provider note.  HEMOGLOBIN A1C MONITORING (POCT)    Date Value   2017 6.1 %   04/10/2013 6.7 % NGSP (H)       Max refill for 12 months from last office visit or per provider note.    If taking for polycystic ovary disease, may refill for 12 months.    Prescription refilled per RN Medication Refill Policy.................... Alanna Banerjee RN ....................  2017   4:42 PM              
7

## 2025-01-02 NOTE — ED ADULT NURSE NOTE - NSFALLRISKINTERV_ED_ALL_ED
Communicate fall risk and risk factors to all staff, patient, and family/Provide visual cue: yellow wristband, yellow gown, etc/Reinforce activity limits and safety measures with patient and family/Call bell, personal items and telephone in reach/Instruct patient to call for assistance before getting out of bed/chair/stretcher/Non-slip footwear applied when patient is off stretcher/[place] to call system/Physically safe environment - no spills, clutter or unnecessary equipment/Purposeful Proactive Rounding/Room/bathroom lighting operational, light cord in reach

## 2025-01-02 NOTE — ED CLERICAL - NS ED CLERK NOTE PRE-ARRIVAL INFORMATION; PCP CONTACT INFORMATION
x3 Complex Repair And Flap Additional Text (Will Appearing After The Standard Complex Repair Text): The complex repair was not sufficient to completely close the primary defect. The remaining additional defect was repaired with the flap mentioned below.

## 2025-01-02 NOTE — ED ADULT NURSE NOTE - CAS DISCH BELONGINGS RETURNED
Had recent intestinal infection, finished abx approx two weeks ago, today woke up with nausea, a little diarrhea and abd pain Not applicable

## 2025-01-03 ENCOUNTER — APPOINTMENT (OUTPATIENT)
Dept: RADIOLOGY | Facility: CLINIC | Age: 75
End: 2025-01-03

## 2025-01-03 VITALS
RESPIRATION RATE: 18 BRPM | HEART RATE: 68 BPM | TEMPERATURE: 98 F | OXYGEN SATURATION: 97 % | SYSTOLIC BLOOD PRESSURE: 128 MMHG | DIASTOLIC BLOOD PRESSURE: 74 MMHG

## 2025-01-03 NOTE — ED PROVIDER NOTE - PATIENT PORTAL LINK FT
You can access the FollowMyHealth Patient Portal offered by [facility] by registering at the following website: [email or web address]. By joining [employer]’s FollowMyHealth portal, you will also be able to view your health information using other applications (apps) compatible with our system.

## 2025-01-03 NOTE — ED PROVIDER NOTE - PROGRESS NOTE DETAILS
XXRs unremarkable  US neg for DVT  Pt is already on pain meds by her doctor  Advised to f/u with her doctor

## 2025-01-03 NOTE — ED PROVIDER NOTE - CARE PROVIDERS DIRECT ADDRESSES
[email or web address]

## 2025-01-03 NOTE — ED PROVIDER NOTE - CLINICAL SUMMARY MEDICAL DECISION MAKING FREE TEXT BOX
74-year-old female with history of neuropathy, on gabapentin and tramadol by her doctor presenting with atraumatic foot and ankle pain.  No signs of gout on exam.  Generalized tenderness without any erythema warmth or swelling.  Will get x-rays.  Patient reports her doctor wanted her to get an ultrasound of the lower extremity to rule out DVT although no swelling.  Will get Doppler study in the ED.  Pain management.  Reassess.

## 2025-01-03 NOTE — ED PROVIDER NOTE - OBJECTIVE STATEMENT
Patient is a 74-year-old female with past medical history of dissection of descending thoracic aorta epilepsy on Keppra hypertension vision loss aortic aneurysm A-fib on Eliquis hypertension diabetes COPD colon cancer brought in by EMS for nontraumatic right foot and ankle pain which started today.  Patient took her regular dose of tramadol and gabapentin without relief.  Patient saw PCP and scheduled for outpatient x-ray and ultrasound but pain got worse so came to emergency room.  Patient states pain is intermittent throbbing shooting denies any fever chills wounds or drainage.

## 2025-01-03 NOTE — ED PROVIDER NOTE - CONDITION AT DISCHARGE:
Hemodialysis treatment initiated using right thigh graft and 16 g needles. Pt alert and VS stable. Machine settings per MD order. Will monitor during treatment. Improved

## 2025-01-03 NOTE — ED PROVIDER NOTE - CARE PROVIDER_API CALL
Scott Corbett  Podiatric Medicine and Surgery  [address] [place] [address]  Phone: [phone]  Fax: [phone]  Follow Up Time:     River Johnson  Vascular Surgery  [address] [place] [address], [address]  [place] [address]  Phone: [phone]  Fax: [phone]  Follow Up Time:

## 2025-01-03 NOTE — ED PROVIDER NOTE - NSFOLLOWUPINSTRUCTIONS_ED_ALL_ED_FT
Continue medications as directed  Follow-up with your neurologist vascular and podiatrist  Return to ED for worsening symptoms    Swollen Joint    WHAT YOU NEED TO KNOW:    What do I need to know about joint swelling? Joint swelling may occur in one or more joints. You may have other symptoms, such as pain, tenderness, or stiffness. A swollen joint may be caused by a variety of conditions such as arthritis, pseudogout, gout, tendinitis, or injury.    How is joint swelling diagnosed? Your healthcare provider will ask about your symptoms. Your provider will examine your joint and check how well it moves in different directions. Blood tests or x-rays may be used to find the cause of the swelling. Your provider may also remove fluid from your joint and send it to a lab for tests.    How is joint swelling treated? Treatment depends on the cause of your swollen joint. Your healthcare provider may recommend any of the following:    Rest your swollen joint. Avoid activities that make the swelling or pain worse. You may need to avoid putting weight on your joint while you have pain. Crutches or a walker can be used to avoid putting weight on joints in your lower body.    Apply ice on your swollen joint for 15 to 20 minutes every hour or as directed. Use an ice pack, or put crushed ice in a plastic bag. Cover it with a towel. Ice helps prevent tissue damage and decreases swelling and pain.    Apply heat on your swollen joint for 20 to 30 minutes every 2 hours for as many days as directed. Heat helps decrease pain.    Elevate your swollen joint above the level of your heart as often as you can. This will help decrease swelling and pain. Prop your joint on pillows or blankets to keep it elevated comfortably.    NSAIDs, such as ibuprofen, help decrease swelling, pain, and fever. This medicine is available with or without a doctor's order. NSAIDs can cause stomach bleeding or kidney problems in certain people. If you take blood thinner medicine, always ask your healthcare provider if NSAIDs are safe for you. Always read the medicine label and follow directions.  When should I seek immediate care?    You cannot move your joint at all.    You have severe pain that does not get better with medicine.  When should I contact my healthcare provider?    You have a fever.    You have redness or warmth over the joint.    The swelling does not decrease with treatment.    You have questions or concerns about your condition or care.  CARE AGREEMENT:    You have the right to help plan your care. Learn about your health condition and how it may be treated. Discuss treatment options with your healthcare providers to decide what care you want to receive. You always have the right to refuse treatment.

## 2025-01-03 NOTE — ED PROVIDER NOTE - MUSCULOSKELETAL, MLM
right foot normal pulses no deformity mild swelling to heel no bony ttp nrom of ankle normal sensation color cap refill

## 2025-01-04 ENCOUNTER — LABORATORY RESULT (OUTPATIENT)
Age: 75
End: 2025-01-04

## 2025-01-06 NOTE — DISCHARGE NOTE NURSING/CASE MANAGEMENT/SOCIAL WORK - NSTRANSFERBELONGINGSDISPO_GEN_A_NUR
Onset: 6 months comes and goes. Constant for 4-5 days        Location / description: has had chronic pain in low back and right hip that comes and goes. Came back about 6 months ago. Had protruding disc when imaging was done then. Has begun having pain in left and right hips   Having trouble walking due to left hip pain     Precipitating Factors: 4 years ago thinks she slipped a disc at least she thinks that is what was said.   C1 fracture 20 years ago. Had a c1 and c2 fusion.    Pain Scale (1-10), 10 highest: 10/10    What improves/worsens symptoms: can't walk due to pain. Medication helps a little bit but not much     Symptom specific medications: ibuprofen, gabapentin     LMP : Only if pertains to symptom. N/a      Are you pregnant or breast feeding: not addressed     Recent visits (last 3-4 weeks) for same reason or recent surgery:  1/4/25 went to ED for bronchitis     PLAN:    Go to the Emergency Department    Patient/Caller agrees to follow recommendations.     Reason for Disposition   Can't stand (bear weight) or walk    Protocols used: Hip Pain-A-OH     with patient

## 2025-01-07 LAB
ALBUMIN SERPL ELPH-MCNC: 4.1 G/DL
ALP BLD-CCNC: 91 U/L
ALT SERPL-CCNC: 14 U/L
ANION GAP SERPL CALC-SCNC: 12 MMOL/L
AST SERPL-CCNC: 16 U/L
BASOPHILS # BLD AUTO: 0.02 K/UL
BASOPHILS NFR BLD AUTO: 0.3 %
BILIRUB SERPL-MCNC: 0.2 MG/DL
BUN SERPL-MCNC: 10 MG/DL
CALCIUM SERPL-MCNC: 9.6 MG/DL
CHLORIDE SERPL-SCNC: 103 MMOL/L
CO2 SERPL-SCNC: 28 MMOL/L
CREAT SERPL-MCNC: 0.68 MG/DL
EGFR: 90 ML/MIN/1.73M2
EOSINOPHIL # BLD AUTO: 0.12 K/UL
EOSINOPHIL NFR BLD AUTO: 1.8 %
ESTIMATED AVERAGE GLUCOSE: 180 MG/DL
GLUCOSE SERPL-MCNC: 170 MG/DL
HBA1C MFR BLD HPLC: 7.9 %
HCT VFR BLD CALC: 40.7 %
HGB BLD-MCNC: 11.8 G/DL
IMM GRANULOCYTES NFR BLD AUTO: 0.3 %
LYMPHOCYTES # BLD AUTO: 2.13 K/UL
LYMPHOCYTES NFR BLD AUTO: 31.7 %
MAN DIFF?: NORMAL
MCHC RBC-ENTMCNC: 23.2 PG
MCHC RBC-ENTMCNC: 29 G/DL
MCV RBC AUTO: 80 FL
MONOCYTES # BLD AUTO: 0.9 K/UL
MONOCYTES NFR BLD AUTO: 13.4 %
NEUTROPHILS # BLD AUTO: 3.52 K/UL
NEUTROPHILS NFR BLD AUTO: 52.5 %
PLATELET # BLD AUTO: 242 K/UL
POTASSIUM SERPL-SCNC: 4.6 MMOL/L
PROT SERPL-MCNC: 6.6 G/DL
RBC # BLD: 5.09 M/UL
RBC # FLD: 21.7 %
SODIUM SERPL-SCNC: 143 MMOL/L
T4 FREE SERPL-MCNC: 1.2 NG/DL
TSH SERPL-ACNC: 3.34 UIU/ML
URATE SERPL-MCNC: 4.4 MG/DL
WBC # FLD AUTO: 6.71 K/UL

## 2025-01-08 ENCOUNTER — INPATIENT (INPATIENT)
Facility: HOSPITAL | Age: 75
LOS: 1 days | Discharge: HOME | DRG: 195 | End: 2025-01-10
Attending: INTERNAL MEDICINE | Admitting: INTERNAL MEDICINE
Payer: MEDICARE

## 2025-01-08 VITALS
WEIGHT: 154.98 LBS | SYSTOLIC BLOOD PRESSURE: 105 MMHG | DIASTOLIC BLOOD PRESSURE: 55 MMHG | OXYGEN SATURATION: 99 % | RESPIRATION RATE: 18 BRPM | HEART RATE: 54 BPM | TEMPERATURE: 98 F | HEIGHT: 67 IN

## 2025-01-08 DIAGNOSIS — Z96.653 PRESENCE OF ARTIFICIAL KNEE JOINT, BILATERAL: Chronic | ICD-10-CM

## 2025-01-08 DIAGNOSIS — J45.909 UNSPECIFIED ASTHMA, UNCOMPLICATED: ICD-10-CM

## 2025-01-08 DIAGNOSIS — Z98.89 OTHER SPECIFIED POSTPROCEDURAL STATES: Chronic | ICD-10-CM

## 2025-01-08 DIAGNOSIS — I10 ESSENTIAL (PRIMARY) HYPERTENSION: ICD-10-CM

## 2025-01-08 DIAGNOSIS — I48.20 CHRONIC ATRIAL FIBRILLATION, UNSPECIFIED: ICD-10-CM

## 2025-01-08 DIAGNOSIS — J44.1 CHRONIC OBSTRUCTIVE PULMONARY DISEASE WITH (ACUTE) EXACERBATION: ICD-10-CM

## 2025-01-08 DIAGNOSIS — Z86.79 PERSONAL HISTORY OF OTHER DISEASES OF THE CIRCULATORY SYSTEM: ICD-10-CM

## 2025-01-08 DIAGNOSIS — H05.352 EXOSTOSIS OF LEFT ORBIT: Chronic | ICD-10-CM

## 2025-01-08 DIAGNOSIS — G62.9 POLYNEUROPATHY, UNSPECIFIED: ICD-10-CM

## 2025-01-08 DIAGNOSIS — Z87.09 PERSONAL HISTORY OF OTHER DISEASES OF THE RESPIRATORY SYSTEM: Chronic | ICD-10-CM

## 2025-01-08 DIAGNOSIS — Z98.890 OTHER SPECIFIED POSTPROCEDURAL STATES: Chronic | ICD-10-CM

## 2025-01-08 DIAGNOSIS — Z29.9 ENCOUNTER FOR PROPHYLACTIC MEASURES, UNSPECIFIED: ICD-10-CM

## 2025-01-08 DIAGNOSIS — Z86.73 PERSONAL HISTORY OF TRANSIENT ISCHEMIC ATTACK (TIA), AND CEREBRAL INFARCTION WITHOUT RESIDUAL DEFICITS: ICD-10-CM

## 2025-01-08 DIAGNOSIS — E11.9 TYPE 2 DIABETES MELLITUS WITHOUT COMPLICATIONS: ICD-10-CM

## 2025-01-08 DIAGNOSIS — K62.5 HEMORRHAGE OF ANUS AND RECTUM: Chronic | ICD-10-CM

## 2025-01-08 DIAGNOSIS — G40.909 EPILEPSY, UNSPECIFIED, NOT INTRACTABLE, WITHOUT STATUS EPILEPTICUS: ICD-10-CM

## 2025-01-08 DIAGNOSIS — Z95.2 PRESENCE OF PROSTHETIC HEART VALVE: Chronic | ICD-10-CM

## 2025-01-08 DIAGNOSIS — J18.9 PNEUMONIA, UNSPECIFIED ORGANISM: ICD-10-CM

## 2025-01-08 LAB
ALBUMIN SERPL ELPH-MCNC: 3.5 G/DL — SIGNIFICANT CHANGE UP (ref 3.3–5)
ALP SERPL-CCNC: 79 U/L — SIGNIFICANT CHANGE UP (ref 40–120)
ALT FLD-CCNC: 21 U/L — SIGNIFICANT CHANGE UP (ref 12–78)
ANION GAP SERPL CALC-SCNC: 6 MMOL/L — SIGNIFICANT CHANGE UP (ref 5–17)
APPEARANCE UR: CLEAR — SIGNIFICANT CHANGE UP
APTT BLD: 39.6 SEC — HIGH (ref 24.5–35.6)
AST SERPL-CCNC: 26 U/L — SIGNIFICANT CHANGE UP (ref 15–37)
BASOPHILS # BLD AUTO: 0.02 K/UL — SIGNIFICANT CHANGE UP (ref 0–0.2)
BASOPHILS NFR BLD AUTO: 0.2 % — SIGNIFICANT CHANGE UP (ref 0–2)
BILIRUB SERPL-MCNC: 0.2 MG/DL — SIGNIFICANT CHANGE UP (ref 0.2–1.2)
BILIRUB UR-MCNC: NEGATIVE — SIGNIFICANT CHANGE UP
BUN SERPL-MCNC: 16 MG/DL — SIGNIFICANT CHANGE UP (ref 7–23)
CALCIUM SERPL-MCNC: 10.1 MG/DL — SIGNIFICANT CHANGE UP (ref 8.5–10.1)
CHLORIDE SERPL-SCNC: 108 MMOL/L — SIGNIFICANT CHANGE UP (ref 96–108)
CO2 SERPL-SCNC: 27 MMOL/L — SIGNIFICANT CHANGE UP (ref 22–31)
COLOR SPEC: YELLOW — SIGNIFICANT CHANGE UP
CREAT SERPL-MCNC: 0.83 MG/DL — SIGNIFICANT CHANGE UP (ref 0.5–1.3)
DIFF PNL FLD: NEGATIVE — SIGNIFICANT CHANGE UP
EGFR: 73 ML/MIN/1.73M2 — SIGNIFICANT CHANGE UP
EOSINOPHIL # BLD AUTO: 0.03 K/UL — SIGNIFICANT CHANGE UP (ref 0–0.5)
EOSINOPHIL NFR BLD AUTO: 0.3 % — SIGNIFICANT CHANGE UP (ref 0–6)
FLUAV AG NPH QL: SIGNIFICANT CHANGE UP
FLUBV AG NPH QL: SIGNIFICANT CHANGE UP
GLUCOSE SERPL-MCNC: 188 MG/DL — HIGH (ref 70–99)
GLUCOSE UR QL: NEGATIVE MG/DL — SIGNIFICANT CHANGE UP
HCT VFR BLD CALC: 37.6 % — SIGNIFICANT CHANGE UP (ref 34.5–45)
HGB BLD-MCNC: 11.7 G/DL — SIGNIFICANT CHANGE UP (ref 11.5–15.5)
IMM GRANULOCYTES NFR BLD AUTO: 0.8 % — SIGNIFICANT CHANGE UP (ref 0–0.9)
INR BLD: 1.38 RATIO — HIGH (ref 0.85–1.16)
KETONES UR-MCNC: NEGATIVE MG/DL — SIGNIFICANT CHANGE UP
LACTATE SERPL-SCNC: 2.4 MMOL/L — HIGH (ref 0.7–2)
LACTATE SERPL-SCNC: 2.5 MMOL/L — HIGH (ref 0.7–2)
LEUKOCYTE ESTERASE UR-ACNC: ABNORMAL
LYMPHOCYTES # BLD AUTO: 1.2 K/UL — SIGNIFICANT CHANGE UP (ref 1–3.3)
LYMPHOCYTES # BLD AUTO: 11.7 % — LOW (ref 13–44)
MCHC RBC-ENTMCNC: 23.8 PG — LOW (ref 27–34)
MCHC RBC-ENTMCNC: 31.1 G/DL — LOW (ref 32–36)
MCV RBC AUTO: 76.6 FL — LOW (ref 80–100)
MONOCYTES # BLD AUTO: 0.68 K/UL — SIGNIFICANT CHANGE UP (ref 0–0.9)
MONOCYTES NFR BLD AUTO: 6.6 % — SIGNIFICANT CHANGE UP (ref 2–14)
NEUTROPHILS # BLD AUTO: 8.24 K/UL — HIGH (ref 1.8–7.4)
NEUTROPHILS NFR BLD AUTO: 80.4 % — HIGH (ref 43–77)
NITRITE UR-MCNC: NEGATIVE — SIGNIFICANT CHANGE UP
NRBC # BLD: 0 /100 WBCS — SIGNIFICANT CHANGE UP (ref 0–0)
NT-PROBNP SERPL-SCNC: 389 PG/ML — SIGNIFICANT CHANGE UP (ref 0–450)
PH UR: 7.5 — SIGNIFICANT CHANGE UP (ref 5–8)
PLATELET # BLD AUTO: 255 K/UL — SIGNIFICANT CHANGE UP (ref 150–400)
POTASSIUM SERPL-MCNC: 5.1 MMOL/L — SIGNIFICANT CHANGE UP (ref 3.5–5.3)
POTASSIUM SERPL-SCNC: 5.1 MMOL/L — SIGNIFICANT CHANGE UP (ref 3.5–5.3)
PROT SERPL-MCNC: 7.1 G/DL — SIGNIFICANT CHANGE UP (ref 6–8.3)
PROT UR-MCNC: NEGATIVE MG/DL — SIGNIFICANT CHANGE UP
PROTHROM AB SERPL-ACNC: 16.2 SEC — HIGH (ref 9.9–13.4)
RAPID RVP RESULT: SIGNIFICANT CHANGE UP
RBC # BLD: 4.91 M/UL — SIGNIFICANT CHANGE UP (ref 3.8–5.2)
RBC # FLD: 21.3 % — HIGH (ref 10.3–14.5)
RSV RNA NPH QL NAA+NON-PROBE: SIGNIFICANT CHANGE UP
SARS-COV-2 RNA SPEC QL NAA+PROBE: SIGNIFICANT CHANGE UP
SARS-COV-2 RNA SPEC QL NAA+PROBE: SIGNIFICANT CHANGE UP
SODIUM SERPL-SCNC: 141 MMOL/L — SIGNIFICANT CHANGE UP (ref 135–145)
SP GR SPEC: 1.02 — SIGNIFICANT CHANGE UP (ref 1–1.03)
TROPONIN I, HIGH SENSITIVITY RESULT: 12.6 NG/L — SIGNIFICANT CHANGE UP
UROBILINOGEN FLD QL: 0.2 MG/DL — SIGNIFICANT CHANGE UP (ref 0.2–1)
WBC # BLD: 10.25 K/UL — SIGNIFICANT CHANGE UP (ref 3.8–10.5)
WBC # FLD AUTO: 10.25 K/UL — SIGNIFICANT CHANGE UP (ref 3.8–10.5)

## 2025-01-08 PROCEDURE — 99285 EMERGENCY DEPT VISIT HI MDM: CPT | Mod: FS

## 2025-01-08 PROCEDURE — 71045 X-RAY EXAM CHEST 1 VIEW: CPT | Mod: 26

## 2025-01-08 PROCEDURE — 71250 CT THORAX DX C-: CPT | Mod: 26

## 2025-01-08 RX ORDER — GLUCAGON INJECTION, SOLUTION 0.5 MG/.1ML
1 INJECTION, SOLUTION SUBCUTANEOUS ONCE
Refills: 0 | Status: DISCONTINUED | OUTPATIENT
Start: 2025-01-08 | End: 2025-01-10

## 2025-01-08 RX ORDER — LABETALOL HCL 300 MG/1
100 TABLET, FILM COATED ORAL EVERY 8 HOURS
Refills: 0 | Status: DISCONTINUED | OUTPATIENT
Start: 2025-01-08 | End: 2025-01-08

## 2025-01-08 RX ORDER — APIXABAN 5 MG/1
5 TABLET, FILM COATED ORAL ONCE
Refills: 0 | Status: DISCONTINUED | OUTPATIENT
Start: 2025-01-08 | End: 2025-01-08

## 2025-01-08 RX ORDER — MEXILETINE HYDROCHLORIDE 150 MG/1
200 CAPSULE ORAL ONCE
Refills: 0 | Status: COMPLETED | OUTPATIENT
Start: 2025-01-08 | End: 2025-01-08

## 2025-01-08 RX ORDER — DEXTROSE MONOHYDRATE 25 G/50ML
25 INJECTION, SOLUTION INTRAVENOUS ONCE
Refills: 0 | Status: DISCONTINUED | OUTPATIENT
Start: 2025-01-08 | End: 2025-01-10

## 2025-01-08 RX ORDER — IPRATROPIUM BROMIDE AND ALBUTEROL SULFATE .5; 2.5 MG/3ML; MG/3ML
3 SOLUTION RESPIRATORY (INHALATION) ONCE
Refills: 0 | Status: COMPLETED | OUTPATIENT
Start: 2025-01-08 | End: 2025-01-08

## 2025-01-08 RX ORDER — DEXTROSE MONOHYDRATE 25 G/50ML
12.5 INJECTION, SOLUTION INTRAVENOUS ONCE
Refills: 0 | Status: DISCONTINUED | OUTPATIENT
Start: 2025-01-08 | End: 2025-01-10

## 2025-01-08 RX ORDER — AZITHROMYCIN MONOHYDRATE 200 MG/5ML
500 POWDER, FOR SUSPENSION ORAL ONCE
Refills: 0 | Status: COMPLETED | OUTPATIENT
Start: 2025-01-08 | End: 2025-01-08

## 2025-01-08 RX ORDER — SODIUM CHLORIDE 9 MG/ML
1000 INJECTION, SOLUTION INTRAVENOUS
Refills: 0 | Status: DISCONTINUED | OUTPATIENT
Start: 2025-01-08 | End: 2025-01-10

## 2025-01-08 RX ORDER — SERTRALINE HYDROCHLORIDE 25 MG/1
50 TABLET ORAL DAILY
Refills: 0 | Status: DISCONTINUED | OUTPATIENT
Start: 2025-01-08 | End: 2025-01-10

## 2025-01-08 RX ORDER — DOXYCYCLINE MONOHYDRATE 100 MG
100 TABLET ORAL ONCE
Refills: 0 | Status: COMPLETED | OUTPATIENT
Start: 2025-01-08 | End: 2025-01-08

## 2025-01-08 RX ORDER — NIFEDIPINE 60 MG/1
30 TABLET, EXTENDED RELEASE ORAL DAILY
Refills: 0 | Status: DISCONTINUED | OUTPATIENT
Start: 2025-01-08 | End: 2025-01-08

## 2025-01-08 RX ORDER — MEXILETINE HYDROCHLORIDE 150 MG/1
200 CAPSULE ORAL ONCE
Refills: 0 | Status: DISCONTINUED | OUTPATIENT
Start: 2025-01-08 | End: 2025-01-08

## 2025-01-08 RX ORDER — ROSUVASTATIN 40 MG/1
5 TABLET, FILM COATED ORAL AT BEDTIME
Refills: 0 | Status: DISCONTINUED | OUTPATIENT
Start: 2025-01-08 | End: 2025-01-10

## 2025-01-08 RX ORDER — MEGESTROL ACETATE 40 MG/1
20 TABLET ORAL ONCE
Refills: 0 | Status: DISCONTINUED | OUTPATIENT
Start: 2025-01-08 | End: 2025-01-08

## 2025-01-08 RX ORDER — LABETALOL HCL 300 MG/1
100 TABLET, FILM COATED ORAL ONCE
Refills: 0 | Status: DISCONTINUED | OUTPATIENT
Start: 2025-01-08 | End: 2025-01-08

## 2025-01-08 RX ORDER — METHYLPREDNISOLONE 4 MG/1
40 TABLET ORAL ONCE
Refills: 0 | Status: COMPLETED | OUTPATIENT
Start: 2025-01-08 | End: 2025-01-08

## 2025-01-08 RX ORDER — GABAPENTIN 300 MG/1
100 CAPSULE ORAL ONCE
Refills: 0 | Status: DISCONTINUED | OUTPATIENT
Start: 2025-01-08 | End: 2025-01-08

## 2025-01-08 RX ORDER — APIXABAN 5 MG/1
5 TABLET, FILM COATED ORAL ONCE
Refills: 0 | Status: COMPLETED | OUTPATIENT
Start: 2025-01-08 | End: 2025-01-08

## 2025-01-08 RX ORDER — INSULIN LISPRO 100/ML
VIAL (ML) SUBCUTANEOUS
Refills: 0 | Status: DISCONTINUED | OUTPATIENT
Start: 2025-01-08 | End: 2025-01-10

## 2025-01-08 RX ORDER — METHYLPREDNISOLONE 4 MG/1
125 TABLET ORAL ONCE
Refills: 0 | Status: COMPLETED | OUTPATIENT
Start: 2025-01-08 | End: 2025-01-08

## 2025-01-08 RX ORDER — NIFEDIPINE 60 MG/1
30 TABLET, EXTENDED RELEASE ORAL ONCE
Refills: 0 | Status: DISCONTINUED | OUTPATIENT
Start: 2025-01-08 | End: 2025-01-08

## 2025-01-08 RX ORDER — SODIUM CHLORIDE 9 MG/ML
1900 INJECTION, SOLUTION INTRAMUSCULAR; INTRAVENOUS; SUBCUTANEOUS ONCE
Refills: 0 | Status: DISCONTINUED | OUTPATIENT
Start: 2025-01-08 | End: 2025-01-08

## 2025-01-08 RX ORDER — FLUTICASONE PROPIONATE AND SALMETEROL 50; 500 UG/1; UG/1
1 POWDER ORAL; RESPIRATORY (INHALATION)
Refills: 0 | Status: DISCONTINUED | OUTPATIENT
Start: 2025-01-08 | End: 2025-01-10

## 2025-01-08 RX ORDER — CLOPIDOGREL BISULFATE 75 MG/1
75 TABLET, FILM COATED ORAL ONCE
Refills: 0 | Status: COMPLETED | OUTPATIENT
Start: 2025-01-09 | End: 2025-01-09

## 2025-01-08 RX ORDER — DEXTROSE MONOHYDRATE 25 G/50ML
15 INJECTION, SOLUTION INTRAVENOUS ONCE
Refills: 0 | Status: DISCONTINUED | OUTPATIENT
Start: 2025-01-08 | End: 2025-01-10

## 2025-01-08 RX ORDER — MEXILETINE HYDROCHLORIDE 150 MG/1
200 CAPSULE ORAL THREE TIMES A DAY
Refills: 0 | Status: DISCONTINUED | OUTPATIENT
Start: 2025-01-08 | End: 2025-01-08

## 2025-01-08 RX ORDER — CLOPIDOGREL BISULFATE 75 MG/1
75 TABLET, FILM COATED ORAL ONCE
Refills: 0 | Status: DISCONTINUED | OUTPATIENT
Start: 2025-01-08 | End: 2025-01-08

## 2025-01-08 RX ORDER — SODIUM CHLORIDE 9 MG/ML
1000 INJECTION, SOLUTION INTRAMUSCULAR; INTRAVENOUS; SUBCUTANEOUS ONCE
Refills: 0 | Status: COMPLETED | OUTPATIENT
Start: 2025-01-08 | End: 2025-01-08

## 2025-01-08 RX ORDER — ALBUTEROL SULFATE 90 UG/1
2 INHALANT RESPIRATORY (INHALATION) EVERY 6 HOURS
Refills: 0 | Status: DISCONTINUED | OUTPATIENT
Start: 2025-01-08 | End: 2025-01-10

## 2025-01-08 RX ORDER — LEVETIRACETAM 100 MG/ML
1000 SOLUTION ORAL
Refills: 0 | Status: DISCONTINUED | OUTPATIENT
Start: 2025-01-08 | End: 2025-01-10

## 2025-01-08 RX ORDER — INSULIN GLARGINE-YFGN 100 [IU]/ML
25 INJECTION, SOLUTION SUBCUTANEOUS AT BEDTIME
Refills: 0 | Status: DISCONTINUED | OUTPATIENT
Start: 2025-01-08 | End: 2025-01-10

## 2025-01-08 RX ORDER — PREDNISONE 5 MG
20 TABLET ORAL DAILY
Refills: 0 | Status: DISCONTINUED | OUTPATIENT
Start: 2025-01-08 | End: 2025-01-10

## 2025-01-08 RX ORDER — METHYLPREDNISOLONE 4 MG/1
8 TABLET ORAL ONCE
Refills: 0 | Status: DISCONTINUED | OUTPATIENT
Start: 2025-01-08 | End: 2025-01-08

## 2025-01-08 RX ORDER — GABAPENTIN 300 MG/1
200 CAPSULE ORAL ONCE
Refills: 0 | Status: COMPLETED | OUTPATIENT
Start: 2025-01-08 | End: 2025-01-08

## 2025-01-08 RX ORDER — MEGESTROL ACETATE 40 MG/1
20 TABLET ORAL DAILY
Refills: 0 | Status: DISCONTINUED | OUTPATIENT
Start: 2025-01-08 | End: 2025-01-10

## 2025-01-08 RX ORDER — LEVETIRACETAM 100 MG/ML
1000 SOLUTION ORAL
Refills: 0 | Status: DISCONTINUED | OUTPATIENT
Start: 2025-01-08 | End: 2025-01-08

## 2025-01-08 RX ORDER — BUDESONIDE 0.5 MG/2ML
0.5 INHALANT ORAL ONCE
Refills: 0 | Status: DISCONTINUED | OUTPATIENT
Start: 2025-01-08 | End: 2025-01-08

## 2025-01-08 RX ORDER — GABAPENTIN 300 MG/1
200 CAPSULE ORAL THREE TIMES A DAY
Refills: 0 | Status: DISCONTINUED | OUTPATIENT
Start: 2025-01-08 | End: 2025-01-10

## 2025-01-08 RX ORDER — DIPHENHYDRAMINE HCL 25 MG
50 TABLET ORAL ONCE
Refills: 0 | Status: COMPLETED | OUTPATIENT
Start: 2025-01-08 | End: 2025-01-08

## 2025-01-08 RX ADMIN — Medication 100 MILLIGRAM(S): at 13:37

## 2025-01-08 RX ADMIN — GABAPENTIN 200 MILLIGRAM(S): 300 CAPSULE ORAL at 22:12

## 2025-01-08 RX ADMIN — APIXABAN 5 MILLIGRAM(S): 5 TABLET, FILM COATED ORAL at 22:11

## 2025-01-08 RX ADMIN — FLUTICASONE PROPIONATE AND SALMETEROL 1 DOSE(S): 50; 500 POWDER ORAL; RESPIRATORY (INHALATION) at 19:31

## 2025-01-08 RX ADMIN — MEXILETINE HYDROCHLORIDE 200 MILLIGRAM(S): 150 CAPSULE ORAL at 22:11

## 2025-01-08 RX ADMIN — IPRATROPIUM BROMIDE AND ALBUTEROL SULFATE 3 MILLILITER(S): .5; 2.5 SOLUTION RESPIRATORY (INHALATION) at 12:08

## 2025-01-08 RX ADMIN — NIFEDIPINE 30 MILLIGRAM(S): 60 TABLET, EXTENDED RELEASE ORAL at 22:11

## 2025-01-08 RX ADMIN — Medication 50 MILLIGRAM(S): at 14:15

## 2025-01-08 RX ADMIN — METHYLPREDNISOLONE 40 MILLIGRAM(S): 4 TABLET ORAL at 14:15

## 2025-01-08 RX ADMIN — SODIUM CHLORIDE 1000 MILLILITER(S): 9 INJECTION, SOLUTION INTRAMUSCULAR; INTRAVENOUS; SUBCUTANEOUS at 12:08

## 2025-01-08 RX ADMIN — ROSUVASTATIN 5 MILLIGRAM(S): 40 TABLET, FILM COATED ORAL at 22:11

## 2025-01-08 RX ADMIN — METHYLPREDNISOLONE 125 MILLIGRAM(S): 4 TABLET ORAL at 12:07

## 2025-01-08 RX ADMIN — Medication 1: at 16:40

## 2025-01-08 RX ADMIN — GABAPENTIN 200 MILLIGRAM(S): 300 CAPSULE ORAL at 14:54

## 2025-01-08 RX ADMIN — MEXILETINE HYDROCHLORIDE 200 MILLIGRAM(S): 150 CAPSULE ORAL at 15:03

## 2025-01-08 RX ADMIN — LEVETIRACETAM 1000 MILLIGRAM(S): 100 SOLUTION ORAL at 22:11

## 2025-01-08 NOTE — H&P ADULT - NSICDXPASTSURGICALHX_GEN_ALL_CORE_FT
PAST SURGICAL HISTORY:  No significant past surgical history      PAST SURGICAL HISTORY:  S/P AVR (aortic valve replacement)     S/P colostomy

## 2025-01-08 NOTE — ED ADULT NURSE NOTE - IN THE PAST 12 MONTHS HAVE YOU USED DRUGS OTHER THAN THOSE REQUIRED FOR MEDICAL REASON?
Patient and family agree to readiness to go home.     Home health agency [facility] health notified of discharge. Rafael spoke with daughter.     No

## 2025-01-08 NOTE — H&P ADULT - PROBLEM SELECTOR PLAN 4
Prednisone Pregnancy And Lactation Text: This medication is Pregnancy Category C and it isn't know if it is safe during pregnancy. This medication is excreted in breast milk. Chronic  - cont home nifedipine and labetalol with hold parameters Chronic on inhalers and prednisone  - Cont home inhalers and steroids  - Start solumedrol 20mg IV Q8h  - Breo interchange will start Advair  -Cont Duoneb prn wheezing

## 2025-01-08 NOTE — H&P ADULT - RESPIRATORY
no respiratory distress/no use of accessory muscles/diminished breath sounds, L/diminished breath sounds, R/wheezes no rales/no rhonchi/no respiratory distress/no use of accessory muscles/diminished breath sounds, L/diminished breath sounds, R/wheezes

## 2025-01-08 NOTE — CONSULT NOTE ADULT - SUBJECTIVE AND OBJECTIVE BOX
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 Patient reports for the past 1-2 days having increased chest pressure out of her baseline, states she has also had cough, wheezing and shortness of breath with minimal relief after using her normal regimen of inhalers and nebulizers. Patient denies any fevers since onset of symptoms, however states her legs have been on/off swelling for many years. Patient of note has had multiple episodes and hospital visits for PNA over the past 2-3 months recent discharge on 12/12/24 at [facility] treated with ertapanem premedicated with benadryl. Pt otherwise denies any other concerns or complaints at this time, no fever, headache, cp, n/v/d.    ED course  Vitals: T 98.1F, HR 54, /64, RR 18, SpO2 99% on 2L NC  Significant labs: WBC 10.25, Hgb 11.7, Hct 37.6  Na 141, K 5.1, Cr .83  Lactate 2.5  UA Trace leuk esterase  Imaging:   CT CHEST --> Limited by motion and absence of IV contrast.    Displaced intimal calcifications in the mid to distal descending thoracic   aorta (602:65) consistent with dissection.  Right upper lobe groundglass opacities versus motion artifact. No focal   consolidation. Trace bilateral pleural effusions.  1.4 cm pleural-based right lower lobe nodule.   Cardiomegaly. Aortic valve repair.    CXR --> interstitial chf  EKG: RBBB  In ED patient given: doxy 100 1x, duoneb x2, solumedrol x1, benadryl x1, 1L NS x1   (08 Jan 2025 14:39)    PAST MEDICAL & SURGICAL HISTORY:  History of COPD  Afib  Aortic valve replaced  Epilepsy  No significant past surgical history    SOCIAL HISTORY:  No tobacco, ethanol, or drug abuse.  FAMILY HISTORY:    No family history of acute MI or sudden cardiac death.  MEDICATIONS  (STANDING):  apixaban 5 milliGRAM(s) Oral Once  dextrose 5%. 1000 milliLiter(s) (50 mL/Hr) IV Continuous <Continuous>  dextrose 5%. 1000 milliLiter(s) (100 mL/Hr) IV Continuous <Continuous>  dextrose 50% Injectable 25 Gram(s) IV Push once  dextrose 50% Injectable 12.5 Gram(s) IV Push once  dextrose 50% Injectable 25 Gram(s) IV Push once  fluticasone propionate/ salmeterol 250-50 MICROgram(s) Diskus 1 Dose(s) Inhalation two times a day  gabapentin 200 milliGRAM(s) Oral three times a day  glucagon  Injectable 1 milliGRAM(s) IntraMuscular once  insulin glargine Injectable (LANTUS) 25 Unit(s) SubCutaneous at bedtime  insulin lispro (ADMELOG) corrective regimen sliding scale   SubCutaneous three times a day before meals  labetalol 100 milliGRAM(s) Oral every 8 hours  levETIRAcetam 1000 milliGRAM(s) Oral two times a day  megestrol 20 milliGRAM(s) Oral daily  mexiletine 200 milliGRAM(s) Oral three times a day  NIFEdipine XL 30 milliGRAM(s) Oral daily  rosuvastatin 5 milliGRAM(s) Oral at bedtime  sertraline 50 milliGRAM(s) Oral daily    MEDICATIONS  (PRN):  buDESOnide    Inhalation Suspension 0.5 milliGRAM(s) Inhalation Once PRN Wheezing  dextrose Oral Gel 15 Gram(s) Oral once PRN Blood Glucose LESS THAN 70 milliGRAM(s)/deciliter    Allergies    aspirin (Unknown)  ampicillin (Unknown)  Valium (Unknown)  codeine (Unknown)  cefepime (Short breath (Severe))  Percocet (Unknown)  penicillin (Unknown)  Avelox (Unknown)    Intolerances      REVIEW OF SYSTEMS:  CONSTITUTIONAL: No weakness, fevers or chills  EYES/ENT: No visual changes;  No vertigo or throat pain   NECK: No pain or stiffness  RESPIRATORY: No cough, wheezing, hemoptysis; No shortness of breath  CARDIOVASCULAR: No chest pain or palpitations  GASTROINTESTINAL: No abdominal pain. No nausea, vomiting, or hematemesis; No diarrhea or constipation. No melena or hematochezia.  GENITOURINARY: No dysuria, frequency or hematuria  NEUROLOGICAL: No numbness or weakness  SKIN: No itching or rash  All other review of systems is negative unless indicated above  VITAL SIGNS:   Vital Signs Last 24 Hrs  T(C): 36.7 (08 Jan 2025 10:54), Max: 36.7 (08 Jan 2025 10:54)  T(F): 98.1 (08 Jan 2025 10:54), Max: 98.1 (08 Jan 2025 10:54)  HR: 54 (08 Jan 2025 10:54) (54 - 54)  BP: 144/64 (08 Jan 2025 14:06) (105/55 - 144/64)  BP(mean): --  RR: 18 (08 Jan 2025 10:54) (18 - 18)  SpO2: 99% (08 Jan 2025 10:54) (99% - 99%)    Parameters below as of 08 Jan 2025 10:54  Patient On (Oxygen Delivery Method): nasal cannula  O2 Flow (L/min): 2    I&O's Summary    On Exam:  Constitutional: NAD, alert and oriented x 3  Lungs:  Non-labored, breath sounds are clear bilaterally, No wheezing, rales or rhonchi  Cardiovascular: RRR.  S1 and S2 positive.  No murmurs, rubs, gallops or clicks  Gastrointestinal: Bowel Sounds present, soft, nontender.   Lymph: No peripheral edema. No cervical lymphadenopathy.  Neurological: Alert, no focal deficits  Skin: No rashes or ulcers   Psych:  Mood & affect appropriate.    LABS: All Labs Reviewed:                        11.7   10.25 )-----------( 255      ( 08 Jan 2025 11:50 )             37.6     08 Jan 2025 11:50    141    |  108    |  16     ----------------------------<  188    5.1     |  27     |  0.83     Ca    10.1       08 Jan 2025 11:50    TPro  7.1    /  Alb  3.5    /  TBili  0.2    /  DBili  x      /  AST  26     /  ALT  21     /  AlkPhos  79     08 Jan 2025 11:50    PT/INR - ( 08 Jan 2025 11:50 )   PT: 16.2 sec;   INR: 1.38 ratio         PTT - ( 08 Jan 2025 11:50 )  PTT:39.6 sec    Blood Culture:     RADIOLOGY:    EKG:    Assessment/Plan:  74y Female    Kristine Wadsworth DNP, NP-C, AGACNP-C  Cardiology  Call TEAMS       [facility] - Le Jung, Jim Pablo Savella, Cohen Jerzy  Office Number: [phone]    Initial Consult Note: This is a 73 y/o F with Afib (on Eliquis), HTN, Type B 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  Evaluated by Dr. 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  Also has been coughing but denies fever, chills, sick contact, recent travel.  Denies palpitations or orthopnea.  Admits to SOB and KRAMER.  In the ED, her troponin was normal.  EKG showed junctional rhythm, rate of 50's, with RBBB and LAD.    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 Patient reports for the past 1-2 days having increased chest pressure out of her baseline, states she has also had cough, wheezing and shortness of breath with minimal relief after using her normal regimen of inhalers and nebulizers. Patient denies any fevers since onset of symptoms, however states her legs have been on/off swelling for many years. Patient of note has had multiple episodes and hospital visits for PNA over the past 2-3 months recent discharge on 12/12/24 at [facility] treated with ertapanem premedicated with benadryl. Pt otherwise denies any other concerns or complaints at this time, no fever, headache, cp, n/v/d.    ED course  Vitals: T 98.1F, HR 54, /64, RR 18, SpO2 99% on 2L NC  Significant labs: WBC 10.25, Hgb 11.7, Hct 37.6  Na 141, K 5.1, Cr .83  Lactate 2.5  UA Trace leuk esterase  Imaging:   CT CHEST --> Limited by motion and absence of IV contrast.    Displaced intimal calcifications in the mid to distal descending thoracic   aorta (602:65) consistent with dissection.  Right upper lobe groundglass opacities versus motion artifact. No focal   consolidation. Trace bilateral pleural effusions.  1.4 cm pleural-based right lower lobe nodule.   Cardiomegaly. Aortic valve repair.    CXR --> interstitial chf  EKG: RBBB  In ED patient given: doxy 100 1x, duoneb x2, solumedrol x1, benadryl x1, 1L NS x1   (08 Jan 2025 14:39)    PAST MEDICAL & SURGICAL HISTORY:  History of COPD  Afib  Aortic valve replaced  Epilepsy  No significant past surgical history    SOCIAL HISTORY:  No tobacco, ethanol, or drug abuse.  FAMILY HISTORY:    No family history of acute MI or sudden cardiac death.  MEDICATIONS  (STANDING):  apixaban 5 milliGRAM(s) Oral Once  dextrose 5%. 1000 milliLiter(s) (50 mL/Hr) IV Continuous <Continuous>  dextrose 5%. 1000 milliLiter(s) (100 mL/Hr) IV Continuous <Continuous>  dextrose 50% Injectable 25 Gram(s) IV Push once  dextrose 50% Injectable 12.5 Gram(s) IV Push once  dextrose 50% Injectable 25 Gram(s) IV Push once  fluticasone propionate/ salmeterol 250-50 MICROgram(s) Diskus 1 Dose(s) Inhalation two times a day  gabapentin 200 milliGRAM(s) Oral three times a day  glucagon  Injectable 1 milliGRAM(s) IntraMuscular once  insulin glargine Injectable (LANTUS) 25 Unit(s) SubCutaneous at bedtime  insulin lispro (ADMELOG) corrective regimen sliding scale   SubCutaneous three times a day before meals  labetalol 100 milliGRAM(s) Oral every 8 hours  levETIRAcetam 1000 milliGRAM(s) Oral two times a day  megestrol 20 milliGRAM(s) Oral daily  mexiletine 200 milliGRAM(s) Oral three times a day  NIFEdipine XL 30 milliGRAM(s) Oral daily  rosuvastatin 5 milliGRAM(s) Oral at bedtime  sertraline 50 milliGRAM(s) Oral daily    MEDICATIONS  (PRN):  buDESOnide    Inhalation Suspension 0.5 milliGRAM(s) Inhalation Once PRN Wheezing  dextrose Oral Gel 15 Gram(s) Oral once PRN Blood Glucose LESS THAN 70 milliGRAM(s)/deciliter    Allergies    aspirin (Unknown)  ampicillin (Unknown)  Valium (Unknown)  codeine (Unknown)  cefepime (Short breath (Severe))  Percocet (Unknown)  penicillin (Unknown)  Avelox (Unknown)    Intolerances      REVIEW OF SYSTEMS:  CONSTITUTIONAL: No weakness, fevers or chills  EYES/ENT: No visual changes;  No vertigo or throat pain   NECK: No pain or stiffness  RESPIRATORY: No cough, wheezing, hemoptysis; No shortness of breath  CARDIOVASCULAR: No chest pain or palpitations  GASTROINTESTINAL: No abdominal pain. No nausea, vomiting, or hematemesis; No diarrhea or constipation. No melena or hematochezia.  GENITOURINARY: No dysuria, frequency or hematuria  NEUROLOGICAL: No numbness or weakness  SKIN: No itching or rash  All other review of systems is negative unless indicated above  VITAL SIGNS:   Vital Signs Last 24 Hrs  T(C): 36.7 (08 Jan 2025 10:54), Max: 36.7 (08 Jan 2025 10:54)  T(F): 98.1 (08 Jan 2025 10:54), Max: 98.1 (08 Jan 2025 10:54)  HR: 54 (08 Jan 2025 10:54) (54 - 54)  BP: 144/64 (08 Jan 2025 14:06) (105/55 - 144/64)  BP(mean): --  RR: 18 (08 Jan 2025 10:54) (18 - 18)  SpO2: 99% (08 Jan 2025 10:54) (99% - 99%)    Parameters below as of 08 Jan 2025 10:54  Patient On (Oxygen Delivery Method): nasal cannula  O2 Flow (L/min): 2    I&O's Summary    On Exam:  Constitutional: NAD, alert and oriented x 3  Lungs:  Non-labored, breath sounds are clear bilaterally, No wheezing, rales or rhonchi  Cardiovascular: RRR.  S1 and S2 positive.  No murmurs, rubs, gallops or clicks  Gastrointestinal: Bowel Sounds present, soft, nontender.   Lymph: No peripheral edema. No cervical lymphadenopathy.  Neurological: Alert, no focal deficits  Skin: No rashes or ulcers   Psych:  Mood & affect appropriate.    LABS: All Labs Reviewed:                        11.7   10.25 )-----------( 255      ( 08 Jan 2025 11:50 )             37.6     08 Jan 2025 11:50    141    |  108    |  16     ----------------------------<  188    5.1     |  27     |  0.83     Ca    10.1       08 Jan 2025 11:50    TPro  7.1    /  Alb  3.5    /  TBili  0.2    /  DBili  x      /  AST  26     /  ALT  21     /  AlkPhos  79     08 Jan 2025 11:50    PT/INR - ( 08 Jan 2025 11:50 )   PT: 16.2 sec;   INR: 1.38 ratio       PTT - ( 08 Jan 2025 11:50 )  PTT:39.6 sec    Blood Culture:     RADIOLOGY:     ACC: [id number] EXAM:  CT ANGIO ABD PELV (W)AW IC   ORDERED BY: UMANG HENDERSON     ACC: [id number] EXAM:  CT ANGIO CHEST AORTA WAWIC   ORDERED BY: UMANG HENDERSON     PROCEDURE DATE:  01/08/2025      INTERPRETATION:  CLINICAL INFORMATION: Clinical concern for dissection.    COMPARISON: CT chest earlier the same day.    CONTRAST/COMPLICATIONS:  IV Contrast: Omnipaque 350 (accession [id number]), IV contrast documented   in unlinked concurrent exam (accession [id number])  90 cc administered   10   cc discarded  Oral Contrast: NONE    PROCEDURE:  CT of the Chest, Abdomen and Pelvis was performed.  Sagittal and coronal reformats were performed.    FINDINGS:  CHEST:  LUNGS AND LARGE AIRWAYS: Patent central airways. There is no evidence of   consolidative pneumonia. Innumerable tree-in-bud nodules seen within the   lateral segment of the right middle lobe and throughout some of the   periphery of the right lower lobe are consistent with an ongoing   infectious/inflammatory process. Mild atelectatic changes at both lung   bases. Mild diffuse bronchial wall thickening, more significant in the   lower lobes. There is no evidence of consolidative pneumonia. No evidence   of a pneumothorax.  PLEURA: No pleural effusion.  VESSELS: Aortic calcifications. Coronary artery calcifications. The   aortic valve repair surgical changes. Type a dissection of the thoracic   aorta originating at the level of the brachiocephalic trunk (innominate   artery). The dissection flap and false lumen extends distally to the   infrarenal abdominal aorta, just above the level of the bifurcation. Some   of the thoracic and abdominal/pelvic lumbar arterial takeoff from the   false lumen. RAMIRO, SMA, celiac, and bilateral renal arteries originate   from the lumen. No evidence of and organ damage. No evidence of major   arterial vessel occlusion. constipation.  HEART: Borderline size. No pericardial effusion.  MEDIASTINUM AND MARANDA: No lymphadenopathy.  CHEST WALL AND LOWER NECK: Median sternotomysurgical changes without   complications.    ABDOMEN AND PELVIS:  LIVER: Steatosis.  BILE DUCTS: Normal caliber.  GALLBLADDER: Within normal limits.  SPLEEN: Within normal limits.  PANCREAS: Indeterminate hypodense lesions within the pancreatic tail,  with the largest measuring up to 2.5 cm in greatest dimension.  ADRENALS: Within normal limits.  KIDNEYS/URETERS: Indeterminate bilateral renal hypodensities are too   small to adequately characterize.    BLADDER: Within normal limits.  REPRODUCTIVE ORGANS: Hysterectomy.    BOWEL: Rectosigmoidectomy with left anterior pelvic wall colostomy.   Moderate-to-severe constipation. Some small bowel deep pelvic adhesions   are seen, without CT evident complications.  PERITONEUM/RETROPERITONEUM: Within normal limits.  VESSELS: Atherosclerotic changes. Read above.  LYMPH NODES: No lymphadenopathy.  ABDOMINAL WALL: Postsurgical changes.  BONES: Degenerative changes. Internal fixation surgical changes right   sacroiliac joint and pubic symphysis, without CT evident complications.   Diffuse osseous demineralization. Mild thoracolumbar scoliosis. Necrosis   of the bilateral femoral heads is highly suspected, right greater than   left, and may be further assessed and confirmed with outpatient   contrast-enhanced MRI bilateral hips. No acute osseous abnormality   detected. No suspicious bone lesions.    IMPRESSION:  Type A aortic dissection extending from the proximal aortic arch,   innominate artery level, to the infrarenal abdominal aorta segment, above   the bifurcation. There are no complications of vessel occlusion or and   organ damage. Read above text for additional findings and detailed   explanations.    --- End of Report ---   KHLOE AC MD; Attending Radiologist  This document has been electronically signed. Jan 8 2025  4:41PM  EKG: junctional rhythm, rate of 50's, with RBBB and LAD.

## 2025-01-08 NOTE — H&P ADULT - CARDIOVASCULAR
normal/S1 S2 present/no gallops/no rub/no murmur details… normal/S1 S2 present/no gallops/no rub/no murmur/no JVD/no pedal edema/vascular

## 2025-01-08 NOTE — ED ADULT NURSE REASSESSMENT NOTE - NS ED NURSE REASSESS COMMENT FT1
Discussed with resident on 3084 about potential admission change of plans, family and resident aware about family's intentions resident will look further into it. At this time Patient is AOx4, safety precautions in place, in no acute distress at this time.

## 2025-01-08 NOTE — H&P ADULT - PROBLEM SELECTOR PLAN 5
Chronic  - On home basaglar, novolog  - Mod dose iss, fingersticks, hypoglycemia protocol  - F/;u AM A1C Chronic  - On home basaglar, novolog  - Lantus 25u qhs low dose iss, fingersticks, hypoglycemia protocol  - F/;u AM A1C Chronic  - On home lantus 30U in pm, 18 in AM, novolog sliding scale  - Lantus 25u qhs low dose iss, fingersticks, hypoglycemia protocol  - F/;u AM A1C Chronic BP soft   - HOLD nifedipine and labetalol  as per Cardio Dr Lujan

## 2025-01-08 NOTE — PROVIDER CONTACT NOTE (CRITICAL VALUE NOTIFICATION) - DATE AND TIME:
Pt is resting in cot with eyes closed,  respirations even and unlabored. Pt voices no concern or need at this time. Safety sitter at bedside to ensure pt safety. Will continue to monitor.       Marcelo Young RN  01/04/22 [id number] 08-Jan-2025 12:29

## 2025-01-08 NOTE — ED ADULT NURSE NOTE - NSFALLRISKINTERV_ED_ALL_ED
Assistance OOB with selected safe patient handling equipment if applicable/Assistance with ambulation/Communicate fall risk and risk factors to all staff, patient, and family/Provide patient with walking aids/Provide visual cue: yellow wristband, yellow gown, etc/Reinforce activity limits and safety measures with patient and family/Call bell, personal items and telephone in reach/Instruct patient to call for assistance before getting out of bed/chair/stretcher/Non-slip footwear applied when patient is off stretcher/[place] to call system/Physically safe environment - no spills, clutter or unnecessary equipment/Purposeful Proactive Rounding/Room/bathroom lighting operational, light cord in reach

## 2025-01-08 NOTE — ED PROVIDER NOTE - CLINICAL SUMMARY MEDICAL DECISION MAKING FREE TEXT BOX
Patient is a 74y old  Female who presents with a chief complaint of shortness of breath and cough. she started with worsening shortness of breath yesterday but has also had 2 PNA in the past month. denies fevers or chest pain. of note patient has had a type A dissesction in the past surgically repaired and the abdominal component is being medically managed.    PE: Patient is  no acute distress, no signs of head trauma, poor air movement, slight wheezing, abdomen is soft and nontender to palpation without guarding or rebound, normal pulses in all extremities    plan for labs, medications, cardiac monitoring, imaging, reassess    patient's non contrast CT scan shows the dissection, will obtain CTA to reassess to ensure no change from previous. will need to pre-medicate prior to contrast    patient's CTa shows type A dissection, however no imaging in our system of previous CTA despite her previous surgery and imaging performed at [facility]. I spoke with the radiologist who states no acute component of the dissection seen however they have no prior to compare to see if there is any change.    I called the transfer center and spoke with the CT surgery team, previous patient had surgery with Dr. Tan Cabrera and spoke with the covering NP who will contact the surgeon to review the images    I spoke with the NP who had the surgeon review the images, states no change from previous imaging, the dissection is stable on imaging and they report no acute intervention needed at this time only BP management. She is appropriate to admit here at [facility] for COPD exacerbation and possible PNA

## 2025-01-08 NOTE — H&P ADULT - PROBLEM SELECTOR PLAN 1
Hx of COPD, not requiring home O2  - Patient sob wheezing  - CT chest --> B/L Trace pleural effusion   - Patient previous hx of PNA admitted at [facility] received ertapanem with benadryl  - ID (Dr Peng) consulted recs appreciated  - Pulm (Dr Luis)  - Cardio (Le) TTE ordered Hx of COPD, not requiring home O2  - Patient sob wheezing  - CT chest --> B/L Trace pleural effusion   - Patient previous hx of PNA admitted at [facility] received ertapanem with benadryl  - Received 1x doxy in ED will continue  - Pulm (Dr Luis) consulted recs appreciated Hx of COPD,  Ac on chronic hypoxic respiratory failure  not requiring home O2  - Patient sob wheezing  - CT chest --> B/L Trace pleural effusion   - Received 1x doxy in ED will continue  - Pulm (Dr Luis) consulted recs appreciated  -S/P IV Solumedrol continue  Pt's on Home steroids daily  RVP- neg

## 2025-01-08 NOTE — H&P ADULT - HISTORY OF PRESENT ILLNESS
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 Patient reports for the past 1-2 days having increased chest pressure out of her baseline, states she has also had cough, wheezing and shortness of breath with minimal relief after using her normal regimen of inhalers and nebulizers. Patient denies any fevers since onset of symptoms, however states her legs have been on/off swelling for many years. Patient of note has had multiple episodes and hospital visits for PNA over the past 2-3 months recent discharge on 12/12/24 at [facility] treated with ertapanem premedicated with benadryl. Pt otherwise denies any other concerns or complaints at this time, no fever, headache, cp, n/v/d.    ED course  Vitals: T 98.1F, HR 54, /64, RR 18, SpO2 99% on 2L NC  Significant labs: WBC 10.25, Hgb 11.7, Hct 37.6  Na 141, K 5.1, Cr .83  Lactate 2.5  UA Trace leuk esterase  Imaging:   CT CHEST --> Limited by motion and absence of IV contrast.    Displaced intimal calcifications in the mid to distal descending thoracic   aorta (602:65) consistent with dissection.  Right upper lobe groundglass opacities versus motion artifact. No focal   consolidation. Trace bilateral pleural effusions.  1.4 cm pleural-based right lower lobe nodule.   Cardiomegaly. Aortic valve repair.    CXR --> interstitial chf  EKG: RBBB  In ED patient given: doxy 100 1x, duoneb x2, solumedrol x1, benadryl x1, 1L NS x1   74yFemale pmhx of Epilepsy on Keppra, COPD, asthma on chronic steroids, bronchiectasis, tracheomalacia s/p tracheloplasty HTN, Hx of dissection of descending thoracic aorta, , hx of aortic aneurysm,  Type B 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  Evaluated by Dr. Antonio,  Type A dissection s/p bioAVR with Bental procedure (9/2022), severe AS s/p TAVR (9/10/2023), TIA's, DVT Afib on Eliquis DM, Colon cancer, neuropathy, presents to the ED for shortness of breath. Patient reports for the past 1-2 days having increased chest pressure out of her baseline, states she has also had cough, wheezing and shortness of breath with minimal relief after using her normal regimen of inhalers and nebulizers. Patient denies any fevers since onset of symptoms, however states her legs have been on/off swelling for many years. Patient of note has had multiple episodes and hospital visits for PNA over the past 2-3 months recent discharge on 12/12/24 at [facility] treated with ertapanem premedicated with benadryl. Pt otherwise denies any other concerns or complaints at this time, no fever, headache, cp, n/v/d.    ED course  Vitals: T 98.1F, HR 54, /64, RR 18, SpO2 99% on 2L NC  Significant labs: WBC 10.25, Hgb 11.7, Hct 37.6  Na 141, K 5.1, Cr .83  Lactate 2.5  UA Trace leuk esterase  Imaging:   CT CHEST --> Limited by motion and absence of IV contrast.    Displaced intimal calcifications in the mid to distal descending thoracic   aorta (602:65) consistent with dissection.  Right upper lobe groundglass opacities versus motion artifact. No focal   consolidation. Trace bilateral pleural effusions.  1.4 cm pleural-based right lower lobe nodule.   Cardiomegaly. Aortic valve repair.   CT Angio C/A/P    IMPRESSION:  Type A aortic dissection extending from the proximal aortic arch,   innominate artery level, to the infrarenal abdominal aorta segment, above   the bifurcation. There are no complications of vessel occlusion or and   organ damage. Read above text for additional findings and detailed   explanations.      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  Evaluated by Dr. Antonio,  Type A dissection s/p bioAVR with Bental procedure (9/2022), severe AS s/p TAVR (9/10/2023), TIA's, DVT Afib on Eliquis, T2DM, Colon cancer, neuropathy, presents to the ED for shortness of breath. Patient reports for the past 1-2 days having increased chest pressure out of her baseline, states she has also had cough, wheezing and shortness of breath with minimal relief after using her normal regimen of inhalers and nebulizers. Patient denies any fevers since onset of symptoms, however states her legs have been on/off swelling for many years. Patient of note has had multiple episodes and hospital visits for PNA over the past 2-3 months recent discharge on 12/12/24 at [facility] treated with ertapanem premedicated with benadryl. Pt otherwise denies any other concerns or complaints at this time, no fever, headache, cp, n/v/d.    ED course  Vitals: T 98.1F, HR 54, /64, RR 18, SpO2 99% on 2L NC  Significant labs: WBC 10.25, Hgb 11.7, Hct 37.6  Na 141, K 5.1, Cr .83  Lactate 2.5  UA Trace leuk esterase  Imaging:   CT CHEST --> Limited by motion and absence of IV contrast.    Displaced intimal calcifications in the mid to distal descending thoracic   aorta (602:65) consistent with dissection.  Right upper lobe groundglass opacities versus motion artifact. No focal   consolidation. Trace bilateral pleural effusions.  1.4 cm pleural-based right lower lobe nodule.   Cardiomegaly. Aortic valve repair.   CT Angio C/A/P    IMPRESSION:  Type A aortic dissection extending from the proximal aortic arch,   innominate artery level, to the infrarenal abdominal aorta segment, above   the bifurcation. There are no complications of vessel occlusion or and   organ damage. Read above text for additional findings and detailed   explanations.      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  Evaluated by Dr. Antonio,  Type A dissection s/p bioAVR with Bental procedure (9/2022), severe AS s/p TAVR (9/10/2023), TIA's, DVT Afib on Eliquis, T2DM, Colon cancer, neuropathy, presents to the ED for shortness of breath. Patient reports for the past 1-2 days having increased chest pressure out of her baseline, states she has also had cough, wheezing and shortness of breath with minimal relief after using her normal regimen of inhalers and nebulizers. Patient denies any fevers since onset of symptoms, however states her legs have been on/off swelling for many years. Patient of note has had multiple episodes and hospital visits for PNA over the past 2-3 months recent discharge on 12/12/24 at [facility] treated with ertapanem premedicated with benadryl. Pt otherwise denies any other concerns or complaints at this time, no fever, headache, cp, n/v/d.    ED course  Vitals: T 98.1F, HR 54, /64, RR 18, SpO2 99% on 2L NC  Significant labs: WBC 10.25, Hgb 11.7, Hct 37.6  Na 141, K 5.1, Cr .83  Lactate 2.5  UA Trace leuk esterase  Imaging:   CT CHEST --> Limited by motion and absence of IV contrast.    Displaced intimal calcifications in the mid to distal descending thoracic   aorta (602:65) consistent with dissection.  Right upper lobe groundglass opacities versus motion artifact. No focal   consolidation. Trace bilateral pleural effusions.  1.4 cm pleural-based right lower lobe nodule.   Cardiomegaly. Aortic valve repair.   CT Angio C/A/P    IMPRESSION:  Type A aortic dissection extending from the proximal aortic arch,   innominate artery level, to the infrarenal abdominal aorta segment, above   the bifurcation. There are no complications of vessel occlusion or and   organ damage. Read above text for additional findings and detailed   explanations.      CXR --> interstitial chf  EKG: RBBB  In ED patient given: doxy 100 1x, duoneb x2, solumedrol x1, benadryl x1, 1L NS x1  ED MD d/w CT Sx at [facility] about CT Angio C/A/P abnormal  results- no surgical intervention , medical management

## 2025-01-08 NOTE — CONSULT NOTE ADULT - NS ATTEND AMEND GEN_ALL_CORE FT
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  Evaluated by Dr. 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  Also has been coughing but denies fever, chills, sick contact, recent travel.  Denies palpitations or orthopnea.  Admits to SOB and KRAMER.  In the ED, her troponin was normal. EKG showed junctional rhythm, rate of 50's, with RBBB and LAD.     extensive vascular hx includingbentall procedure and known type B dissection being managed medically  presents with atyp chest discomfort  ct with type A dissection, though unclear if this represents a pathologic finding or a normal appearance status post repair  ed attending has reached out to cts at [facility] for guidance  does not seem likely to represent acs and would cycle ce  has had several recent courses of abs, unclear if infectious though does not seem so  further management beyond this as per med team  if type A dissection to be tx to tertiary care facility

## 2025-01-08 NOTE — ED PROVIDER NOTE - CARE PLAN
1 Principal Discharge DX:	PNA (pneumonia)  Secondary Diagnosis:	COPD exacerbation  Secondary Diagnosis:	Shortness of breath

## 2025-01-08 NOTE — H&P ADULT - PROBLEM SELECTOR PLAN 6
-On eliquis, continue -On eliquis, continue  - Cont home mexiletine 200mg tid Chronic  - cont home Keppra bid, received AM dose already  - seizure precautions

## 2025-01-08 NOTE — ED ADULT NURSE NOTE - OBJECTIVE STATEMENT
has left colostomy bag, Patient BIBA from home complaining of SOB / cough x3 days with increased weakness. As per family had h/x of PNA, COPD, has left colostomy bag. States has taken about 4 duonebs prior to arrival at home and EMS. Patient is AOx4, safety precautions in place, awaiting evaluation

## 2025-01-08 NOTE — H&P ADULT - ATTENDING COMMENTS
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  Evaluated by Dr. 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  Admitting for COPD exacerbation, Acute on chronic hypoxic respiratory failure   Pt seen, examined, case & care plan d/w pt,residents at detail.  Pulmonary-Dr Luis-PO steroids  Cardio- DR Lujan -HOLD  all AVN block, HOLD all BP meds & Hold AC   PT Eval  AM labs   PO diet   Palliative care eval

## 2025-01-08 NOTE — ED PROVIDER NOTE - PROGRESS NOTE DETAILS
pt with elevated lactate unable ot give appropriate fluids due to chf risk ct abnormal will do cta dr coello to follow up patient signed out to me as awaiting transfer center [place] reviwed CT scan concerning for type a dissection, spoke with Ruben Pereira and cardio thoracic surgeon attending Prince Shultz that the CT scan was reviwed with previous CT and todays CT and is stable, no need for any surgical intervention, may be admitted to [facility]

## 2025-01-08 NOTE — H&P ADULT - NSHPSOCIALHISTORY_GEN_ALL_CORE
Lives at home with daughter, requires assistance to ambulate with walker  No alcohol use, never smoker  Not working

## 2025-01-08 NOTE — ED PROVIDER NOTE - OBJECTIVE STATEMENT
Patient is a 74-year-old female with past medical history of dissection of descending thoracic aorta, epilepsy on Keppra, hypertension, vision loss, aortic aneurysm, A-fib on Eliquis, valve replacement, hypertension, diabetes, COPD, colon cancer, neuropathy brought in by EMS with shortness of breath.  Patient reports yesterday she had acute onset of shortness of breath with cough and wheezing.  Patient took nebulizer treatment which provided minimal relief.  Patient with history of pneumonia twice within the past few months.  Patient denies fever chest pain vomiting abdominal pain rash

## 2025-01-08 NOTE — H&P ADULT - NEUROLOGICAL
normal/cranial nerves II-XII intact/sensation intact negative details… normal/cranial nerves II-XII intact/sensation intact/no spontaneous movement/superficial reflexes intact

## 2025-01-08 NOTE — H&P ADULT - PROBLEM SELECTOR PLAN 2
Chronic on inhalers and prednisone  - Cont home inhalers and steroids Chronic on inhalers and prednisone  - Cont home inhalers and steroids  - Start solmedrol 20mg IV Q8h Chronic on inhalers and prednisone  - Cont home inhalers and steroids  - Start solmedrol 20mg IV Q8h  - Breo interchange will start advair  -Cont duonebs prn wheezing -On ELIQUIS,  HOLD AC as per Cardio-Dr Lujan   - Would hold Eliquis for now  - Would hold any AVN blocker  - HOLD  mexiletine 200mg tid  HR -Bradycardia

## 2025-01-08 NOTE — ED PROVIDER NOTE - INTERPRETATION
wide qrs/abnormal Pre-Excision Curettage Text (Leave Blank If You Do Not Want): Prior to drawing the surgical margin the visible lesion was removed with electrodesiccation and curettage to clearly define the lesion size.

## 2025-01-08 NOTE — H&P ADULT - PROBLEM SELECTOR PLAN 7
On home gabapentin and sertraline, continue On home gabapentin 200mg tid and [id number] qd, continue On Plavix 75 mg daily

## 2025-01-08 NOTE — ED ADULT NURSE REASSESSMENT NOTE - NS ED NURSE REASSESS COMMENT FT1
As per patient, states started feeling chest heaviness with tightness after azithromycin started, medication stopped, Dr. Madera made aware, symptoms improved at this time, no new difficulty talking/swallowing at this time. Patient is AOx4

## 2025-01-08 NOTE — H&P ADULT - PROBLEM SELECTOR PLAN 9
DVT PPX  ; eliquis and plavix DVT PPX  Cont eliquis and plavix On home gabapentin 200mg tid and [id number] qd, continue

## 2025-01-08 NOTE — H&P ADULT - GASTROINTESTINAL
details… normal/soft/nontender/nondistended/normal active bowel sounds normal/soft/nontender/nondistended/normal active bowel sounds/no guarding/no rigidity/no organomegaly/no palpable em

## 2025-01-08 NOTE — H&P ADULT - ASSESSMENT
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  Evaluated by Dr. 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  Evaluated by Dr. Antonio, DM, asthma, COPD on chronic steroid, Type A dissection s/p bioAVR with Bental procedure (9/2022), severe AS s/p TAVR (9/10/2023), TIA's, DVT, bronchiectasis, tracheomalacia s/p tracheoplasty , Colostomy Bag presented to the ED c/o non-radiating CP that started upon waking up today c/o shortness of breath.  Admitting for COPD exacerbation, Acute on chronic hypoxic respiratory failure

## 2025-01-08 NOTE — H&P ADULT - PROBLEM SELECTOR PLAN 3
Chronic  - cont home keppra Chronic  - cont home keppra bid  - seizure precautions Chronic  - cont home keppra bid, received AM dose already  - seizure precautions /a/P    IMPRESSION:  Type A aortic dissection extending from the proximal aortic arch,   innominate artery level, to the infrarenal abdominal aorta segment, above   the bifurcation. There are no complications of vessel occlusion or and   organ damage. Read above text for additional findings and detailed   explanations.   As per CT sx from [facility] -no  vascular intervention needed  Cardio on case d/w

## 2025-01-08 NOTE — H&P ADULT - PROBLEM SELECTOR PLAN 8
Cont plavix Cont plavix 75mg qd Chronic  - On home Lantus 30U in pm, 18 in AM, Novolog sliding scale  - Lantus 25u qhs low dose iss, fingersticks, hypoglycemia protocol  - F/;u AM A1C

## 2025-01-08 NOTE — H&P ADULT - NSICDXPASTMEDICALHX_GEN_ALL_CORE_FT
PAST MEDICAL HISTORY:  Afib     Aortic valve replaced     Epilepsy     History of COPD      PAST MEDICAL HISTORY:  Afib     Aortic valve replaced     Asthma     Bronchiectasis     DM (diabetes mellitus)     Epilepsy     History of COPD     History of seizure disorder     History of TIAs     HTN (hypertension)

## 2025-01-08 NOTE — CONSULT NOTE ADULT - SUBJECTIVE AND OBJECTIVE BOX
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 Patient reports for the past 1-2 days having increased chest pressure out of her baseline, states she has also had cough, wheezing and shortness of breath with minimal relief after using her normal regimen of inhalers and nebulizers. Patient denies any fevers since onset of symptoms, however states her legs have been on/off swelling for many years. Patient of note has had multiple episodes and hospital visits for PNA over the past 2-3 months recent discharge on 12/12/24 at [facility] treated with ertapanem premedicated with benadryl. Pt otherwise denies any other concerns or complaints at this time, no fever, headache, cp, n/v/d.PAST MEDICAL & SURGICAL HISTORY:  History of COPD    Afib    Aortic valve replaced    Epilepsy    No significant past surgical history       Review of Systems:  Other Review of Systems: All other review of systems negative, except as noted in HPI     Review of Systems:  · Negative General Symptoms	no fever; no chills; no fatigue  · Skin/Breast	negative  · Ophthalmologic	negative  · ENMT	negative  · Negative Respiratory and Thorax Symptoms	no hemoptysis  · Respiratory and Thorax Symptoms	wheezing  dyspnea  cough  pleuritic chest pain  sputum production  · Negative Cardiovascular Symptoms	no chest pain; no palpitations; no peripheral edema  · Negative Gastrointestinal Symptoms	no nausea; no vomiting; no diarrhea; no constipation; no abdominal pain  · Genitourinary	negative  · Musculoskeletal	negative  · Neurological	negative  · Psychiatric	negative  · Hematology/Lymphatics	negative  · Endocrine	negative  · Allergic/Immunologic	negative      Allergies and Intolerances:        Allergies:  	Avelox: Drug, Unknown  	Valium: Drug, Unknown  	Percocet: Drug, Unknown  	aspirin: Drug, Unknown  	penicillin: Drug, Unknown  	codeine: Drug, Unknown  	ampicillin: Drug, Unknown    Home Medications:   * Patient Currently Takes Medications as of 08-Jan-2025 14:57 documented in Structured Notes  · 	Basaglar KwikPen 100 units/mL subcutaneous solution: Last Dose Taken:  , 18 unit(s) subcutaneous once a day (at bedtime)  · 	Basaglar KwikPen 100 units/mL subcutaneous solution: Last Dose Taken:  , 30 unit(s) subcutaneous once a day  · 	labetalol 100 mg oral tablet: 1 tab(s) orally every 8 hours  · 	NIFEdipine 30 mg oral tablet, extended release: 1 tab(s) orally once a day  · 	methylPREDNISolone 8 mg oral tablet: 1 tab(s) orally 2 times a day  · 	mexiletine 200 mg oral capsule: 1 cap(s) orally 2 times a day  · 	sertraline 50 mg oral tablet: 1 tab(s) orally once a day  · 	glycopyrrolate 1 mg oral tablet: Last Dose Taken:  , 1 tab(s) orally 3 times a day  · 	megestrol 20 mg oral tablet: 1 tab(s) orally once a day  · 	gabapentin 100 mg oral capsule: Last Dose Taken:  , 1 cap(s) orally 3 times a day  · 	ferrous sulfate: Last Dose Taken:  , 325 milligram(s) orally once a day  · 	DuoNeb 0.5 mg-2.5 mg/3 mL inhalation solution: Last Dose Taken:  , 3 milliliter(s) by nebulizer once a day  · 	clopidogrel 75 mg oral tablet: Last Dose Taken:  , 1 tab(s) orally once a day  · 	budesonide 1 mg/2 mL inhalation suspension: Last Dose Taken:  , 2 milliliter(s) by nebulizer once a day  · 	HumaLOG 100 units/mL injectable solution: injectable sliding scale <100 3u, 101-140 5u, 141-200 7u  · 	Keppra 500 mg oral tablet: 2 tab(s) orally 2 times a day  · 	pantoprazole 40 mg oral delayed release tablet: 1 tab(s) orally once a day  · 	Perforomist 20 mcg/2 mL inhalation solution: 2 milliliter(s) inhaled once a day  · 	rosuvastatin 5 mg oral tablet: 1 tab(s) orally once a day (at bedtime)  · 	Eliquis 5 mg oral tablet: Last Dose Taken:  , 1 tab(s) orally 2 times a day  · 	Breo Ellipta 200 mcg-25 mcg/inh inhalation powder: Last Dose Taken:  , 1 puff(s) inhaled once a day  · 	Tezspire Pre-filled Pen 210 mg/1.91 mL subcutaneous solution: 210 milligram(s) subcutaneously    .    Patient History:    Past Medical, Past Surgical, and Family History:  PAST MEDICAL HISTORY:  Afib     Aortic valve replaced     Epilepsy     History of COPD.     PAST SURGICAL HISTORY:  No significant past surgical history.     Social History:  · Substance use	No  · Social History (marital status, living situation, occupation, and sexual history)	Lives at home with daughter, requires assistance to ambulate with walker  No alcohol use, never smoker  Not working     Tobacco Screening:  · Core Measure Site	Yes  · Has the patient used tobacco in the past 30 days?	No    Risk Assessment:    Present on Admission:  Deep Venous Thrombosis	no  Pulmonary Embolus	no     HIV Screening:  · In accordance with [place] law, we offer every patient who comes to our ED an HIV test. Would you like to be tested today?	Opt out     Hepatitis C Test Questions:  · In accordance with [place] Law, we offer every patient a Hepatitis C test. Would you like to be tested today?	Opt out        Medication list         MEDICATIONS  (STANDING):  apixaban 5 milliGRAM(s) Oral Once  dextrose 5%. 1000 milliLiter(s) (50 mL/Hr) IV Continuous <Continuous>  dextrose 5%. 1000 milliLiter(s) (100 mL/Hr) IV Continuous <Continuous>  dextrose 50% Injectable 25 Gram(s) IV Push once  dextrose 50% Injectable 12.5 Gram(s) IV Push once  dextrose 50% Injectable 25 Gram(s) IV Push once  fluticasone propionate/ salmeterol 250-50 MICROgram(s) Diskus 1 Dose(s) Inhalation two times a day  gabapentin 200 milliGRAM(s) Oral three times a day  glucagon  Injectable 1 milliGRAM(s) IntraMuscular once  insulin glargine Injectable (LANTUS) 25 Unit(s) SubCutaneous at bedtime  insulin lispro (ADMELOG) corrective regimen sliding scale   SubCutaneous three times a day before meals  labetalol 100 milliGRAM(s) Oral every 8 hours  levETIRAcetam 1000 milliGRAM(s) Oral two times a day  megestrol 20 milliGRAM(s) Oral daily  mexiletine 200 milliGRAM(s) Oral three times a day  NIFEdipine XL 30 milliGRAM(s) Oral daily  rosuvastatin 5 milliGRAM(s) Oral at bedtime  sertraline 50 milliGRAM(s) Oral daily    MEDICATIONS  (PRN):  buDESOnide    Inhalation Suspension 0.5 milliGRAM(s) Inhalation Once PRN Wheezing  dextrose Oral Gel 15 Gram(s) Oral once PRN Blood Glucose LESS THAN 70 milliGRAM(s)/deciliter         Vitals log        ICU Vital Signs Last 24 Hrs  T(C): 36.7 (08 Jan 2025 10:54), Max: 36.7 (08 Jan 2025 10:54)  T(F): 98.1 (08 Jan 2025 10:54), Max: 98.1 (08 Jan 2025 10:54)  HR: 54 (08 Jan 2025 10:54) (54 - 54)  BP: 144/64 (08 Jan 2025 14:06) (105/55 - 144/64)  BP(mean): --  ABP: --  ABP(mean): --  RR: 18 (08 Jan 2025 10:54) (18 - 18)  SpO2: 99% (08 Jan 2025 10:54) (99% - 99%)    O2 Parameters below as of 08 Jan 2025 10:54  Patient On (Oxygen Delivery Method): nasal cannula  O2 Flow (L/min): 2               Input and Output:  I&O's Detail      Lab Data                        11.7   10.25 )-----------( 255      ( 08 Jan 2025 11:50 )             37.6     01-08    141  |  108  |  16  ----------------------------<  188[H]  5.1   |  27  |  0.83    Ca    10.1      08 Jan 2025 11:50    TPro  7.1  /  Alb  3.5  /  TBili  0.2  /  DBili  x   /  AST  26  /  ALT  21  /  AlkPhos  79  01-08            Review of Systems	  sob  khan  weakness  o2 support  alert  verbal  all systems reviewed      Objective     Physical Examination    heart s1s2  lung dc BS  head nc  head at  abd soft      Pertinent Lab findings & Imaging      Amezcua:  NO   Adequate UO     I&O's Detail           Discussed with:     Cultures:	        Radiology    ACC: [id number] EXAM:  CT ANGIO ABD PELV (W)AW IC   ORDERED BY: UMANG HENDERSON     ACC: [id number] EXAM:  CT ANGIO CHEST AORTA WAWIC   ORDERED BY: UMANG HENDERSON     PROCEDURE DATE:  01/08/2025          INTERPRETATION:  CLINICAL INFORMATION: Clinical concern for dissection.    COMPARISON: CT chest earlier the same day.    CONTRAST/COMPLICATIONS:  IV Contrast: Omnipaque 350 (accession [id number]), IV contrast documented   in unlinked concurrent exam (accession [id number])  90 cc administered   10   cc discarded  Oral Contrast: NONE  .    PROCEDURE:  CT of the Chest, Abdomen and Pelvis was performed.  Sagittal and coronal reformats were performed.    FINDINGS:  CHEST:  LUNGS AND LARGE AIRWAYS: Patent central airways. There is no evidence of   consolidative pneumonia. Innumerable tree-in-bud nodules seen within the   lateral segment of the right middle lobe and throughout some of the   periphery of the right lower lobe are consistent with an ongoing   infectious/inflammatory process. Mild atelectatic changes at both lung   bases. Mild diffuse bronchial wall thickening, more significant in the   lower lobes. There is no evidence of consolidative pneumonia. No evidence   of a pneumothorax.  PLEURA: No pleural effusion.  VESSELS: Aortic calcifications. Coronary artery calcifications. The   aortic valve repair surgical changes. Type a dissection of the thoracic   aorta originating at the level of the brachiocephalic trunk (innominate   artery). The dissection flap and false lumen extends distally to the   infrarenal abdominal aorta, just above the level of the bifurcation. Some   of the thoracic and abdominal/pelvic lumbar arterial takeoff from the   false lumen. RAMIRO, SMA, celiac, and bilateral renal arteries originate   from the lumen. No evidence of and organ damage. No evidence of major   arterial vessel occlusion. constipation.  HEART: Borderline size. No pericardial effusion.  MEDIASTINUM AND MARANDA: No lymphadenopathy.  CHEST WALL AND LOWER NECK: Median sternotomy surgical changes without   complications.    ABDOMEN AND PELVIS:  LIVER: Steatosis.  BILE DUCTS: Normal caliber.  GALLBLADDER: Within normal limits.  SPLEEN: Within normal limits.  PANCREAS: Indeterminate hypodense lesions within the pancreatic tail,   with the largest measuring up to 2.5 cm in greatest dimension.  ADRENALS: Within normal limits.  KIDNEYS/URETERS: Indeterminate bilateral renal hypodensities are too   small to adequately characterize.    BLADDER: Within normal limits.  REPRODUCTIVE ORGANS: Hysterectomy.    BOWEL: Rectosigmoidectomy with left anterior pelvic wall colostomy.   Moderate-to-severe constipation. Some small bowel deep pelvic adhesions   are seen, without CT evident complications.  PERITONEUM/RETROPERITONEUM: Within normal limits.  VESSELS: Atherosclerotic changes. Read above.  LYMPH NODES: No lymphadenopathy.  ABDOMINAL WALL: Postsurgical changes.  BONES: Degenerative changes. Internal fixation surgical changes right   sacroiliac joint and pubic symphysis, without CT evident complications.   Diffuse osseous demineralization. Mild thoracolumbar scoliosis. Necrosis   of the bilateral femoral heads is highly suspected, right greater than   left, and may be further assessed and confirmed with outpatient   contrast-enhanced MRI bilateral hips. No acute osseous abnormality   detected. No suspicious bone lesions.    IMPRESSION:  Type A aortic dissection extending from the proximal aortic arch,   innominate artery level, to the infrarenal abdominal aorta segment, above   the bifurcation. There are no complications of vessel occlusion or and   organ damage. Read above text for additional findings and detailed   explanations.        --- End of Report ---             KHLOE AC MD; Attending Radiologist  This document has been electronically signed. Jan 8 2025  4:41PM

## 2025-01-09 DIAGNOSIS — E11.65 TYPE 2 DIABETES MELLITUS WITH HYPERGLYCEMIA: ICD-10-CM

## 2025-01-09 LAB
A1C WITH ESTIMATED AVERAGE GLUCOSE RESULT: 7.8 % — HIGH (ref 4–5.6)
ALBUMIN SERPL ELPH-MCNC: 3.2 G/DL — LOW (ref 3.3–5)
ALP SERPL-CCNC: 78 U/L — SIGNIFICANT CHANGE UP (ref 40–120)
ALT FLD-CCNC: 18 U/L — SIGNIFICANT CHANGE UP (ref 12–78)
ANION GAP SERPL CALC-SCNC: 6 MMOL/L — SIGNIFICANT CHANGE UP (ref 5–17)
AST SERPL-CCNC: 9 U/L — LOW (ref 15–37)
BASOPHILS # BLD AUTO: 0.01 K/UL — SIGNIFICANT CHANGE UP (ref 0–0.2)
BASOPHILS NFR BLD AUTO: 0.1 % — SIGNIFICANT CHANGE UP (ref 0–2)
BILIRUB SERPL-MCNC: 0.3 MG/DL — SIGNIFICANT CHANGE UP (ref 0.2–1.2)
BUN SERPL-MCNC: 19 MG/DL — SIGNIFICANT CHANGE UP (ref 7–23)
CALCIUM SERPL-MCNC: 9.2 MG/DL — SIGNIFICANT CHANGE UP (ref 8.5–10.1)
CHLORIDE SERPL-SCNC: 108 MMOL/L — SIGNIFICANT CHANGE UP (ref 96–108)
CHOLEST SERPL-MCNC: 140 MG/DL — SIGNIFICANT CHANGE UP
CO2 SERPL-SCNC: 26 MMOL/L — SIGNIFICANT CHANGE UP (ref 22–31)
CREAT SERPL-MCNC: 0.8 MG/DL — SIGNIFICANT CHANGE UP (ref 0.5–1.3)
EGFR: 76 ML/MIN/1.73M2 — SIGNIFICANT CHANGE UP
EOSINOPHIL # BLD AUTO: 0 K/UL — SIGNIFICANT CHANGE UP (ref 0–0.5)
EOSINOPHIL NFR BLD AUTO: 0 % — SIGNIFICANT CHANGE UP (ref 0–6)
ESTIMATED AVERAGE GLUCOSE: 177 MG/DL — HIGH (ref 68–114)
GLUCOSE BLDC GLUCOMTR-MCNC: 254 MG/DL — HIGH (ref 70–99)
GLUCOSE BLDC GLUCOMTR-MCNC: 290 MG/DL — HIGH (ref 70–99)
GLUCOSE BLDC GLUCOMTR-MCNC: 316 MG/DL — HIGH (ref 70–99)
GLUCOSE BLDC GLUCOMTR-MCNC: 321 MG/DL — HIGH (ref 70–99)
GLUCOSE SERPL-MCNC: 280 MG/DL — HIGH (ref 70–99)
HCT VFR BLD CALC: 35.6 % — SIGNIFICANT CHANGE UP (ref 34.5–45)
HDLC SERPL-MCNC: 70 MG/DL — SIGNIFICANT CHANGE UP
HGB BLD-MCNC: 11.3 G/DL — LOW (ref 11.5–15.5)
IMM GRANULOCYTES NFR BLD AUTO: 0.5 % — SIGNIFICANT CHANGE UP (ref 0–0.9)
LIPID PNL WITH DIRECT LDL SERPL: 57 MG/DL — SIGNIFICANT CHANGE UP
LYMPHOCYTES # BLD AUTO: 1.04 K/UL — SIGNIFICANT CHANGE UP (ref 1–3.3)
LYMPHOCYTES # BLD AUTO: 8.9 % — LOW (ref 13–44)
MCHC RBC-ENTMCNC: 24 PG — LOW (ref 27–34)
MCHC RBC-ENTMCNC: 31.7 G/DL — LOW (ref 32–36)
MCV RBC AUTO: 75.7 FL — LOW (ref 80–100)
MONOCYTES # BLD AUTO: 0.89 K/UL — SIGNIFICANT CHANGE UP (ref 0–0.9)
MONOCYTES NFR BLD AUTO: 7.6 % — SIGNIFICANT CHANGE UP (ref 2–14)
NEUTROPHILS # BLD AUTO: 9.72 K/UL — HIGH (ref 1.8–7.4)
NEUTROPHILS NFR BLD AUTO: 82.9 % — HIGH (ref 43–77)
NON HDL CHOLESTEROL: 71 MG/DL — SIGNIFICANT CHANGE UP
NRBC # BLD: 0 /100 WBCS — SIGNIFICANT CHANGE UP (ref 0–0)
PLATELET # BLD AUTO: 255 K/UL — SIGNIFICANT CHANGE UP (ref 150–400)
POTASSIUM SERPL-MCNC: 4.3 MMOL/L — SIGNIFICANT CHANGE UP (ref 3.5–5.3)
POTASSIUM SERPL-SCNC: 4.3 MMOL/L — SIGNIFICANT CHANGE UP (ref 3.5–5.3)
PROT SERPL-MCNC: 6.5 G/DL — SIGNIFICANT CHANGE UP (ref 6–8.3)
RBC # BLD: 4.7 M/UL — SIGNIFICANT CHANGE UP (ref 3.8–5.2)
RBC # FLD: 21 % — HIGH (ref 10.3–14.5)
SODIUM SERPL-SCNC: 140 MMOL/L — SIGNIFICANT CHANGE UP (ref 135–145)
TRIGL SERPL-MCNC: 66 MG/DL — SIGNIFICANT CHANGE UP
WBC # BLD: 11.72 K/UL — HIGH (ref 3.8–10.5)
WBC # FLD AUTO: 11.72 K/UL — HIGH (ref 3.8–10.5)

## 2025-01-09 PROCEDURE — 99221 1ST HOSP IP/OBS SF/LOW 40: CPT

## 2025-01-09 PROCEDURE — 99233 SBSQ HOSP IP/OBS HIGH 50: CPT

## 2025-01-09 PROCEDURE — G0545: CPT

## 2025-01-09 PROCEDURE — 99222 1ST HOSP IP/OBS MODERATE 55: CPT

## 2025-01-09 RX ORDER — APIXABAN 5 MG/1
5 TABLET, FILM COATED ORAL EVERY 12 HOURS
Refills: 0 | Status: DISCONTINUED | OUTPATIENT
Start: 2025-01-09 | End: 2025-01-10

## 2025-01-09 RX ORDER — MEXILETINE HYDROCHLORIDE 150 MG/1
200 CAPSULE ORAL THREE TIMES A DAY
Refills: 0 | Status: DISCONTINUED | OUTPATIENT
Start: 2025-01-09 | End: 2025-01-10

## 2025-01-09 RX ORDER — MINERAL OIL
10 OIL (ML) MISCELLANEOUS
Refills: 0 | Status: DISCONTINUED | OUTPATIENT
Start: 2025-01-09 | End: 2025-01-10

## 2025-01-09 RX ORDER — POLYETHYLENE GLYCOL 3350 17 G/DOSE
17 POWDER (GRAM) ORAL
Refills: 0 | Status: DISCONTINUED | OUTPATIENT
Start: 2025-01-09 | End: 2025-01-10

## 2025-01-09 RX ORDER — AZITHROMYCIN MONOHYDRATE 200 MG/5ML
500 POWDER, FOR SUSPENSION ORAL ONCE
Refills: 0 | Status: COMPLETED | OUTPATIENT
Start: 2025-01-09 | End: 2025-01-09

## 2025-01-09 RX ORDER — NIFEDIPINE 60 MG/1
30 TABLET, EXTENDED RELEASE ORAL DAILY
Refills: 0 | Status: DISCONTINUED | OUTPATIENT
Start: 2025-01-09 | End: 2025-01-10

## 2025-01-09 RX ORDER — INSULIN LISPRO 100/ML
2 VIAL (ML) SUBCUTANEOUS
Refills: 0 | Status: DISCONTINUED | OUTPATIENT
Start: 2025-01-09 | End: 2025-01-10

## 2025-01-09 RX ORDER — INFLUENZA A VIRUS A/WISCONSIN/588/2019 (H1N1) RECOMBINANT HEMAGGLUTININ ANTIGEN, INFLUENZA A VIRUS A/DARWIN/6/2021 (H3N2) RECOMBINANT HEMAGGLUTININ ANTIGEN, INFLUENZA B VIRUS B/AUSTRIA/1359417/2021 RECOMBINANT HEMAGGLUTININ ANTIGEN, AND INFLUENZA B VIRUS B/PHUKET/3073/2013 RECOMBINANT HEMAGGLUTININ ANTIGEN 45; 45; 45; 45 UG/.5ML; UG/.5ML; UG/.5ML; UG/.5ML
0.5 INJECTION INTRAMUSCULAR ONCE
Refills: 0 | Status: DISCONTINUED | OUTPATIENT
Start: 2025-01-09 | End: 2025-01-10

## 2025-01-09 RX ORDER — AZITHROMYCIN MONOHYDRATE 200 MG/5ML
250 POWDER, FOR SUSPENSION ORAL DAILY
Refills: 0 | Status: DISCONTINUED | OUTPATIENT
Start: 2025-01-10 | End: 2025-01-10

## 2025-01-09 RX ORDER — LABETALOL HCL 300 MG/1
100 TABLET, FILM COATED ORAL EVERY 8 HOURS
Refills: 0 | Status: DISCONTINUED | OUTPATIENT
Start: 2025-01-09 | End: 2025-01-10

## 2025-01-09 RX ORDER — INSULIN GLARGINE-YFGN 100 [IU]/ML
10 INJECTION, SOLUTION SUBCUTANEOUS EVERY MORNING
Refills: 0 | Status: DISCONTINUED | OUTPATIENT
Start: 2025-01-10 | End: 2025-01-10

## 2025-01-09 RX ORDER — MINERAL OIL
30 OIL (ML) MISCELLANEOUS
Refills: 0 | Status: DISCONTINUED | OUTPATIENT
Start: 2025-01-09 | End: 2025-01-09

## 2025-01-09 RX ADMIN — ROSUVASTATIN 5 MILLIGRAM(S): 40 TABLET, FILM COATED ORAL at 21:46

## 2025-01-09 RX ADMIN — LEVETIRACETAM 1000 MILLIGRAM(S): 100 SOLUTION ORAL at 05:46

## 2025-01-09 RX ADMIN — LEVETIRACETAM 1000 MILLIGRAM(S): 100 SOLUTION ORAL at 17:36

## 2025-01-09 RX ADMIN — MEXILETINE HYDROCHLORIDE 200 MILLIGRAM(S): 150 CAPSULE ORAL at 21:47

## 2025-01-09 RX ADMIN — APIXABAN 5 MILLIGRAM(S): 5 TABLET, FILM COATED ORAL at 19:38

## 2025-01-09 RX ADMIN — Medication 3: at 08:25

## 2025-01-09 RX ADMIN — Medication 4: at 12:49

## 2025-01-09 RX ADMIN — GABAPENTIN 200 MILLIGRAM(S): 300 CAPSULE ORAL at 05:46

## 2025-01-09 RX ADMIN — Medication 20 MILLIGRAM(S): at 05:46

## 2025-01-09 RX ADMIN — LABETALOL HCL 100 MILLIGRAM(S): 300 TABLET, FILM COATED ORAL at 13:35

## 2025-01-09 RX ADMIN — Medication 17 GRAM(S): at 17:36

## 2025-01-09 RX ADMIN — Medication 2 UNIT(S): at 17:35

## 2025-01-09 RX ADMIN — CLOPIDOGREL BISULFATE 75 MILLIGRAM(S): 75 TABLET, FILM COATED ORAL at 05:46

## 2025-01-09 RX ADMIN — MEXILETINE HYDROCHLORIDE 200 MILLIGRAM(S): 150 CAPSULE ORAL at 13:36

## 2025-01-09 RX ADMIN — MEGESTROL ACETATE 20 MILLIGRAM(S): 40 TABLET ORAL at 12:51

## 2025-01-09 RX ADMIN — Medication 4: at 17:34

## 2025-01-09 RX ADMIN — GABAPENTIN 200 MILLIGRAM(S): 300 CAPSULE ORAL at 21:46

## 2025-01-09 RX ADMIN — FLUTICASONE PROPIONATE AND SALMETEROL 1 DOSE(S): 50; 500 POWDER ORAL; RESPIRATORY (INHALATION) at 08:27

## 2025-01-09 RX ADMIN — Medication 2 UNIT(S): at 12:50

## 2025-01-09 RX ADMIN — LABETALOL HCL 100 MILLIGRAM(S): 300 TABLET, FILM COATED ORAL at 21:46

## 2025-01-09 RX ADMIN — GABAPENTIN 200 MILLIGRAM(S): 300 CAPSULE ORAL at 13:35

## 2025-01-09 RX ADMIN — NIFEDIPINE 30 MILLIGRAM(S): 60 TABLET, EXTENDED RELEASE ORAL at 12:51

## 2025-01-09 RX ADMIN — FLUTICASONE PROPIONATE AND SALMETEROL 1 DOSE(S): 50; 500 POWDER ORAL; RESPIRATORY (INHALATION) at 19:38

## 2025-01-09 RX ADMIN — INSULIN GLARGINE-YFGN 25 UNIT(S): 100 INJECTION, SOLUTION SUBCUTANEOUS at 21:48

## 2025-01-09 RX ADMIN — SERTRALINE HYDROCHLORIDE 50 MILLIGRAM(S): 25 TABLET ORAL at 12:51

## 2025-01-09 RX ADMIN — Medication 10 MILLILITER(S): at 17:36

## 2025-01-09 RX ADMIN — AZITHROMYCIN MONOHYDRATE 500 MILLIGRAM(S): 200 POWDER, FOR SUSPENSION ORAL at 17:48

## 2025-01-09 NOTE — DISCHARGE NOTE PROVIDER - NSDCMRMEDTOKEN_GEN_ALL_CORE_FT
Breo Ellipta 200 mcg-25 mcg/inh inhalation powder: 1 puff(s) inhaled once a day  budesonide 1 mg/2 mL inhalation suspension: 2 milliliter(s) by nebulizer once a day  clopidogrel 75 mg oral tablet: 1 tab(s) orally once a day  DuoNeb 0.5 mg-2.5 mg/3 mL inhalation solution: 3 milliliter(s) by nebulizer once a day  Eliquis 5 mg oral tablet: 1 tab(s) orally 2 times a day  ferrous sulfate: 325 milligram(s) orally once a day  gabapentin 100 mg oral capsule: 200 milligram(s) orally 3 times a day  glycopyrrolate 1 mg oral tablet: 1 tab(s) orally 3 times a day  HumaLOG 100 units/mL injectable solution: injectable sliding scale &lt;100 3u, 101-140 5u, 141-200 7u  Keppra 500 mg oral tablet: 2 tab(s) orally 2 times a day  labetalol 100 mg oral tablet: 1 tab(s) orally every 8 hours  Lantus 100 units/mL subcutaneous solution: 18 unit(s) subcutaneous once a day  Lantus 100 units/mL subcutaneous solution: 30 unit(s) subcutaneous once a day (at bedtime)  megestrol 20 mg oral tablet: 1 tab(s) orally once a day  methylPREDNISolone 8 mg oral tablet: 1 tab(s) orally 2 times a day  mexiletine 200 mg oral capsule: 1 cap(s) orally 3 times a day  NIFEdipine 30 mg oral tablet, extended release: 1 tab(s) orally once a day  pantoprazole 40 mg oral delayed release tablet: 1 tab(s) orally once a day  Perforomist 20 mcg/2 mL inhalation solution: 2 milliliter(s) inhaled once a day  rosuvastatin 5 mg oral tablet: 1 tab(s) orally once a day (at bedtime)  sertraline 50 mg oral tablet: 1 tab(s) orally once a day  Tezspire Pre-filled Pen 210 mg/1.91 mL subcutaneous solution: 210 milligram(s) subcutaneously   azithromycin 250 mg oral tablet: 1 tab(s) orally once a day Last day of medication is on 1/13/2025. Please start tomorrow on 1/11/2025.  Breo Ellipta 200 mcg-25 mcg/inh inhalation powder: 1 puff(s) inhaled once a day  budesonide 1 mg/2 mL inhalation suspension: 2 milliliter(s) by nebulizer once a day  clopidogrel 75 mg oral tablet: 1 tab(s) orally once a day  DuoNeb 0.5 mg-2.5 mg/3 mL inhalation solution: 3 milliliter(s) by nebulizer once a day  Eliquis 5 mg oral tablet: 1 tab(s) orally 2 times a day  ferrous sulfate: 325 milligram(s) orally once a day  gabapentin 100 mg oral capsule: 200 milligram(s) orally 3 times a day  glycopyrrolate 1 mg oral tablet: 1 tab(s) orally 3 times a day  Keppra 500 mg oral tablet: 2 tab(s) orally 2 times a day  labetalol 100 mg oral tablet: 1 tab(s) orally every 8 hours  Lantus 100 units/mL subcutaneous solution: 18 unit(s) subcutaneous once a day  Lantus 100 units/mL subcutaneous solution: 30 unit(s) subcutaneous once a day (at bedtime)  megestrol 20 mg oral tablet: 1 tab(s) orally once a day  methylPREDNISolone 8 mg oral tablet: 1 tab(s) orally 2 times a day  mexiletine 200 mg oral capsule: 1 cap(s) orally 3 times a day  NIFEdipine 30 mg oral tablet, extended release: 1 tab(s) orally once a day  pantoprazole 40 mg oral delayed release tablet: 1 tab(s) orally once a day  Perforomist 20 mcg/2 mL inhalation solution: 2 milliliter(s) inhaled once a day  rosuvastatin 5 mg oral tablet: 1 tab(s) orally once a day (at bedtime)  sertraline 50 mg oral tablet: 1 tab(s) orally once a day  Tezspire Pre-filled Pen 210 mg/1.91 mL subcutaneous solution: 210 milligram(s) subcutaneously

## 2025-01-09 NOTE — PROGRESS NOTE ADULT - PROBLEM SELECTOR PLAN 5
Chronic BP soft   - Continue nifedipine and labetalol  as per Cardio Dr Lujan with hold parameters Chronic BP soft   - Continue nifedipine and labetalol  3x day as per Cardio Dr Fernandez

## 2025-01-09 NOTE — CARE COORDINATION ASSESSMENT. - NSCAREPROVIDERS_GEN_ALL_CORE_FT
CARE PROVIDERS:  Accepting Physician: Dio Boothe  Access Services: Emilee Pyle  Administration: Tee Boothe  Administration: Griffin Cox  Administration: Fide Rose  Administration: Rajat Varela  Administration: Margie Felipe  Administration: Sergio Em  Admitting: Dio Boothe  Attending: Dio Boothe  Consultant: Scott Lujan  Consultant: Kristine Wadsworth  Consultant: Mahamed Luis  Consultant: Stanton Peng  Covering Team: Nara Santana  ED ACP: Sandee Camacho  ED Attending: Spike Madera ED Nurse: Leila Cevallos  Nurse: Leila Cevallos  Nurse: Nadine Wise  Nurse: Emilee Rodriguez  Nurse: Mallika Carson  Nurse: Guillermina Villagomez  Nurse: Moreno Jiang  Nurse: Efren Garnica  Override: Naidne Wise  PCA/Nursing Assistant: Lucero Shell  PCA/Nursing Assistant: Anna Gonzáles  Physical Therapy: Smita Jay  Physical Therapy: Margarette Chandler  Primary Team: Mau Elliott  Primary Team: Dio Boothe  Primary Team: Natasha Lynn  Primary Team: Vinod Lo  Primary Team: Elida Spain  Primary Team: Alejandro Savage  Quality Review: Nain Fine  Registered Dietitian: Ludy Quach  Respiratory Therapy: Ramesh Tong  Respiratory Therapy: Belkis Tinsley  Respiratory Therapy: Lan Flores  Respiratory Therapy: Gabriela Flores  : Mushtaq Tran  : Jessica Santo

## 2025-01-09 NOTE — CARE COORDINATION ASSESSMENT. - REASON FOR CONSULT
Pt is a [facility] pt known to [facility] at home/coordination of care/homecare service prior to admission

## 2025-01-09 NOTE — PHYSICAL THERAPY INITIAL EVALUATION ADULT - CRITERIA FOR SKILLED THERAPEUTIC INTERVENTIONS
impairments found/risk reduction/prevention/rehab potential/predicted duration of therapy intervention/anticipated discharge recommendation

## 2025-01-09 NOTE — PHYSICAL THERAPY INITIAL EVALUATION ADULT - PERTINENT HX OF CURRENT PROBLEM, REHAB EVAL
74yFemale pmhx of Epilepsy on Keppra, COPD, asthma on chronic steroids, bronchiectasis, tracheomalacia s/p tracheloplasty HTN, Hx of dissection of descending thoracic aorta, , hx of aortic aneurysm,  Type B 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  Evaluated by Dr. Antonio,  Type A dissection s/p bioAVR with Bental procedure (9/2022), severe AS s/p TAVR (9/10/2023), TIA's, DVT Afib on Eliquis, T2DM, Colon cancer, neuropathy, presents to the ED for shortness of breath. Patient reports for the past 1-2 days having increased chest pressure out of her baseline, states she has also had cough, wheezing and shortness of breath with minimal relief after using her normal regimen of inhalers and nebulizers. Patient denies any fevers since onset of symptoms, however states her legs have been on/off swelling for many years. Patient of note has had multiple episodes and hospital visits for PNA over the past 2-3 months recent discharge on 12/12/24 at [facility] treated with ertapanem premedicated with benadryl. Pt otherwise denies any other concerns or complaints at this time, no fever, headache, cp, n/v/d.

## 2025-01-09 NOTE — PROGRESS NOTE ADULT - SUBJECTIVE AND OBJECTIVE BOX
[facility] -- Le Jung, Jim Pablo Savella, , Jerzy Waller  Office # [phone]    Follow Up: Chest Pain, Type A/B Dissection     Subjective/Observations: Seen awake and alert, non-orthopneic on RA.  States, cough is muc better and no further chest pain.  No tele events    REVIEW OF SYSTEMS: All other review of systems is negative unless indicated above  PAST MEDICAL & SURGICAL HISTORY:  History of COPD  Paroxysmal Afib  Aortic valve replaced  Epilepsy  No significant past surgical history    MEDICATIONS  (STANDING):  dextrose 5%. 1000 milliLiter(s) (50 mL/Hr) IV Continuous <Continuous>  dextrose 5%. 1000 milliLiter(s) (100 mL/Hr) IV Continuous <Continuous>  dextrose 50% Injectable 25 Gram(s) IV Push once  dextrose 50% Injectable 12.5 Gram(s) IV Push once  dextrose 50% Injectable 25 Gram(s) IV Push once  fluticasone propionate/ salmeterol 250-50 MICROgram(s) Diskus 1 Dose(s) Inhalation two times a day  gabapentin 200 milliGRAM(s) Oral three times a day  glucagon  Injectable 1 milliGRAM(s) IntraMuscular once  influenza  Vaccine (HIGH DOSE) 0.5 milliLiter(s) IntraMuscular once  insulin glargine Injectable (LANTUS) 25 Unit(s) SubCutaneous at bedtime  insulin lispro (ADMELOG) corrective regimen sliding scale   SubCutaneous three times a day before meals  labetalol 100 milliGRAM(s) Oral every 8 hours  levETIRAcetam 1000 milliGRAM(s) Oral two times a day  megestrol 20 milliGRAM(s) Oral daily  mexiletine 200 milliGRAM(s) Oral three times a day  NIFEdipine XL 30 milliGRAM(s) Oral daily  predniSONE   Tablet 20 milliGRAM(s) Oral daily  rosuvastatin 5 milliGRAM(s) Oral at bedtime  sertraline 50 milliGRAM(s) Oral daily    MEDICATIONS  (PRN):  albuterol    90 MICROgram(s) HFA Inhaler 2 Puff(s) Inhalation every 6 hours PRN Shortness of Breath and/or Wheezing  dextrose Oral Gel 15 Gram(s) Oral once PRN Blood Glucose LESS THAN 70 milliGRAM(s)/deciliter    Allergies    aspirin (Unknown)  ampicillin (Unknown)  Valium (Unknown)  codeine (Unknown)  cefepime (Short breath (Severe))  Percocet (Unknown)  penicillin (Unknown)  Avelox (Unknown)    Intolerances    Vital Signs Last 24 Hrs  T(C): 36.5 (09 Jan 2025 05:05), Max: 36.7 (08 Jan 2025 10:54)  T(F): 97.7 (09 Jan 2025 05:05), Max: 98.1 (08 Jan 2025 10:54)  HR: 69 (09 Jan 2025 10:00) (50 - 69)  BP: 152/77 (09 Jan 2025 10:00) (105/55 - 152/77)  BP(mean): --  RR: 17 (09 Jan 2025 10:00) (16 - 18)  SpO2: 98% (09 Jan 2025 10:00) (98% - 99%)    Parameters below as of 09 Jan 2025 10:00  Patient On (Oxygen Delivery Method): room air    I&O's Summary    Weight (kg): 70.3 (01-08 @ 10:54)  PHYSICAL EXAM:  TELE: NSR  Constitutional: NAD, awake and alert  HEENT: Moist Mucous Membranes, Anicteric  Pulmonary: Non-labored, breath sounds are diminished bilaterally, No wheezing, rales or rhonchi  Cardiovascular: Regular, S1 and S2, +murmurs, no rubs, gallops or clicks  Gastrointestinal: Bowel Sounds present, soft, nontender.   Lymph: No peripheral edema. No lymphadenopathy.  Skin: No visible rashes or ulcers.  Psych:  Mood & affect appropriate  LABS: All Labs Reviewed:                        11.3   11.72 )-----------( 255      ( 09 Jan 2025 05:16 )             35.6                         11.7   10.25 )-----------( 255      ( 08 Jan 2025 11:50 )             37.6     09 Jan 2025 05:16    140    |  108    |  19     ----------------------------<  280    4.3     |  26     |  0.80   08 Jan 2025 11:50    141    |  108    |  16     ----------------------------<  188    5.1     |  27     |  0.83     Ca    9.2        09 Jan 2025 05:16  Ca    10.1       08 Jan 2025 11:50    TPro  6.5    /  Alb  3.2    /  TBili  0.3    /  DBili  x      /  AST  9      /  ALT  18     /  AlkPhos  78     09 Jan 2025 05:16  TPro  7.1    /  Alb  3.5    /  TBili  0.2    /  DBili  x      /  AST  26     /  ALT  21     /  AlkPhos  79     08 Jan 2025 11:50    PT/INR - ( 08 Jan 2025 11:50 )   PT: 16.2 sec;   INR: 1.38 ratio      PTT - ( 08 Jan 2025 11:50 )  PTT:39.6 sec    12 Lead ECG:   Ventricular Rate 53 BPM    QRS Duration 134 ms    Q-T Interval 464 ms    QTC Calculation(Bazett) 435 ms    R Axis -76 degrees    T Axis -39 degrees    Diagnosis Line Wide QRS rhythm  Right bundle branch block  Left anterior fascicular block  *** Bifascicular block ***  T wave abnormality, consider lateral ischemia  Abnormal ECG  No previous ECGs available  Confirmed by eL FARLEY, Scott ([id number]) on 1/8/2025 1:52:02 PM (01-08-25 @ 11:04)

## 2025-01-09 NOTE — PATIENT PROFILE ADULT - FALL HARM RISK - HARM RISK INTERVENTIONS

## 2025-01-09 NOTE — CARE COORDINATION ASSESSMENT. - NSPASTMEDSURGHISTORY_GEN_ALL_CORE_FT
PAST MEDICAL & SURGICAL HISTORY:  Epilepsy      Aortic valve replaced      Afib      History of COPD      No significant past surgical history

## 2025-01-09 NOTE — PROGRESS NOTE ADULT - SUBJECTIVE AND OBJECTIVE BOX
Patient is a 74y old  Female who presents with a chief complaint of COPD Exacerbation (09 Jan 2025 12:51)    HPI:  74yFemale pmhx of Epilepsy on Keppra, COPD, asthma on chronic steroids, bronchiectasis, tracheomalacia s/p tracheloplasty HTN, Hx of dissection of descending thoracic aorta, , hx of aortic aneurysm,  Type B 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  Evaluated by Dr. Antonio,  Type A dissection s/p bioAVR with Bental procedure (9/2022), severe AS s/p TAVR (9/10/2023), TIA's, DVT Afib on Eliquis, T2DM, Colon cancer, neuropathy, presents to the ED for shortness of breath. Patient reports for the past 1-2 days having increased chest pressure out of her baseline, states she has also had cough, wheezing and shortness of breath with minimal relief after using her normal regimen of inhalers and nebulizers. Patient denies any fevers since onset of symptoms, however states her legs have been on/off swelling for many years. Patient of note has had multiple episodes and hospital visits for PNA over the past 2-3 months recent discharge on 12/12/24 at [facility] treated with ertapanem premedicated with benadryl. Pt otherwise denies any other concerns or complaints at this time, no fever, headache, cp, n/v/d.    ED course  Vitals: T 98.1F, HR 54, /64, RR 18, SpO2 99% on 2L NC  Significant labs: WBC 10.25, Hgb 11.7, Hct 37.6  Na 141, K 5.1, Cr .83  Lactate 2.5  UA Trace leuk esterase  Imaging:   CT CHEST --> Limited by motion and absence of IV contrast.    Displaced intimal calcifications in the mid to distal descending thoracic   aorta (602:65) consistent with dissection.  Right upper lobe groundglass opacities versus motion artifact. No focal   consolidation. Trace bilateral pleural effusions.  1.4 cm pleural-based right lower lobe nodule.   Cardiomegaly. Aortic valve repair.   CT Angio C/A/P    IMPRESSION:  Type A aortic dissection extending from the proximal aortic arch,   innominate artery level, to the infrarenal abdominal aorta segment, above   the bifurcation. There are no complications of vessel occlusion or and   organ damage. Read above text for additional findings and detailed   explanations.      CXR --> interstitial chf  EKG: RBBB  In ED patient given: doxy 100 1x, duoneb x2, solumedrol x1, benadryl x1, 1L NS x1  ED MD d/w CT Sx at [facility] about CT Angio C/A/P abnormal  results- no surgical intervention , medical management    (08 Jan 2025 14:39)    INTERVAL HPI:  1/9/25: Pt was seen and examined at bedside. Pt states she has a cough with yellow-green sputum. Was previously admitted at [facility] for COPD exacerbation and treated with "ertapenem and benadryl" due to her long list of allergies. Takes both miralax and mineral oil to avoid constipation. Reports that she has shortness of breath with minimal exertion.     OVERNIGHT EVENTS: No acute overnight events.     Home Medications:  Breo Ellipta 200 mcg-25 mcg/inh inhalation powder: 1 puff(s) inhaled once a day (08 Jan 2025 18:59)  budesonide 1 mg/2 mL inhalation suspension: 2 milliliter(s) by nebulizer once a day (08 Jan 2025 18:59)  clopidogrel 75 mg oral tablet: 1 tab(s) orally once a day (08 Jan 2025 18:59)  DuoNeb 0.5 mg-2.5 mg/3 mL inhalation solution: 3 milliliter(s) by nebulizer once a day (08 Jan 2025 18:59)  Eliquis 5 mg oral tablet: 1 tab(s) orally 2 times a day (08 Jan 2025 18:59)  ferrous sulfate: 325 milligram(s) orally once a day (08 Jan 2025 18:59)  gabapentin 100 mg oral capsule: 200 milligram(s) orally 3 times a day (08 Jan 2025 18:59)  glycopyrrolate 1 mg oral tablet: 1 tab(s) orally 3 times a day (08 Jan 2025 18:59)  HumaLOG 100 units/mL injectable solution: injectable sliding scale &lt;100 3u, 101-140 5u, 141-200 7u (08 Jan 2025 18:59)  Keppra 500 mg oral tablet: 2 tab(s) orally 2 times a day (08 Jan 2025 18:59)  labetalol 100 mg oral tablet: 1 tab(s) orally every 8 hours (08 Jan 2025 18:59)  Lantus 100 units/mL subcutaneous solution: 18 unit(s) subcutaneous once a day (08 Jan 2025 18:59)  Lantus 100 units/mL subcutaneous solution: 30 unit(s) subcutaneous once a day (at bedtime) (08 Jan 2025 18:59)  megestrol 20 mg oral tablet: 1 tab(s) orally once a day (08 Jan 2025 18:59)  methylPREDNISolone 8 mg oral tablet: 1 tab(s) orally 2 times a day (08 Jan 2025 18:59)  mexiletine 200 mg oral capsule: 1 cap(s) orally 3 times a day (08 Jan 2025 18:59)  NIFEdipine 30 mg oral tablet, extended release: 1 tab(s) orally once a day (08 Jan 2025 18:59)  pantoprazole 40 mg oral delayed release tablet: 1 tab(s) orally once a day (08 Jan 2025 18:59)  Perforomist 20 mcg/2 mL inhalation solution: 2 milliliter(s) inhaled once a day (08 Jan 2025 18:59)  rosuvastatin 5 mg oral tablet: 1 tab(s) orally once a day (at bedtime) (08 Jan 2025 18:59)  sertraline 50 mg oral tablet: 1 tab(s) orally once a day (08 Jan 2025 18:59)  Tezspire Pre-filled Pen 210 mg/1.91 mL subcutaneous solution: 210 milligram(s) subcutaneously (08 Jan 2025 18:59)      MEDICATIONS  (STANDING):  azithromycin   Tablet 500 milliGRAM(s) Oral once  dextrose 5%. 1000 milliLiter(s) (50 mL/Hr) IV Continuous <Continuous>  dextrose 5%. 1000 milliLiter(s) (100 mL/Hr) IV Continuous <Continuous>  dextrose 50% Injectable 25 Gram(s) IV Push once  dextrose 50% Injectable 12.5 Gram(s) IV Push once  dextrose 50% Injectable 25 Gram(s) IV Push once  fluticasone propionate/ salmeterol 250-50 MICROgram(s) Diskus 1 Dose(s) Inhalation two times a day  gabapentin 200 milliGRAM(s) Oral three times a day  glucagon  Injectable 1 milliGRAM(s) IntraMuscular once  influenza  Vaccine (HIGH DOSE) 0.5 milliLiter(s) IntraMuscular once  insulin glargine Injectable (LANTUS) 25 Unit(s) SubCutaneous at bedtime  insulin lispro (ADMELOG) corrective regimen sliding scale   SubCutaneous three times a day before meals  insulin lispro Injectable (ADMELOG) 2 Unit(s) SubCutaneous three times a day before meals  labetalol 100 milliGRAM(s) Oral every 8 hours  levETIRAcetam 1000 milliGRAM(s) Oral two times a day  megestrol 20 milliGRAM(s) Oral daily  mexiletine 200 milliGRAM(s) Oral three times a day  mineral oil 10 milliLiter(s) Oral two times a day  NIFEdipine XL 30 milliGRAM(s) Oral daily  polyethylene glycol 3350 17 Gram(s) Oral two times a day  predniSONE   Tablet 20 milliGRAM(s) Oral daily  rosuvastatin 5 milliGRAM(s) Oral at bedtime  sertraline 50 milliGRAM(s) Oral daily    MEDICATIONS  (PRN):  albuterol    90 MICROgram(s) HFA Inhaler 2 Puff(s) Inhalation every 6 hours PRN Shortness of Breath and/or Wheezing  dextrose Oral Gel 15 Gram(s) Oral once PRN Blood Glucose LESS THAN 70 milliGRAM(s)/deciliter      aspirin (Unknown)  ampicillin (Unknown)  Valium (Unknown)  codeine (Unknown)  cefepime (Short breath (Severe))  Percocet (Unknown)  penicillin (Unknown)  Avelox (Unknown)      Social History:  Lives at home with daughter, requires assistance to ambulate with walker  No alcohol use, never smoker  Not working (08 Jan 2025 14:39)      REVIEW OF SYSTEMS:  CONSTITUTIONAL: No fever, No chills, No fatigue, No myalgia, No Body ache, No Weakness  EYES: No eye pain,  No visual disturbances, No discharge, No Redness  ENMT: No ear pain, No nose bleed, No vertigo; No sinus pain, No throat pain, No Congestion  NECK: No pain, No stiffness  RESPIRATORY: +cough, +wheezing, No hemoptysis, +shortness of breath with minimal exertion  CARDIOVASCULAR: No chest pain, No palpitations  GASTROINTESTINAL: No abdominal pain, No epigastric pain. No nausea, No vomiting, No diarrhea, No constipation; [ x ] BM-  GENITOURINARY: No dysuria, No frequency, No urgency, No hematuria, No incontinence  NEUROLOGICAL: No headaches, No dizziness, No numbness, No tingling, No tremors, No weakness  EXTREMITIES: No Swelling, No Pain, No Edema  SKIN: [ x ] No itching, burning, rashes, or lesions   MUSCULOSKELETAL: No joint pain, No joint swelling; No muscle pain, No back pain, No extremity pain  PSYCHIATRIC: No depression, No anxiety, No mood swings, No difficulty sleeping at night  PAIN SCALE: [ x ] None  [  ] Other-  ROS Unable to obtain due to: [  ] Dementia  [  ] Lethargy  [  ] Sedated  [  ] Non verbal  REST OF REVIEW OF SYSTEMS: [ x ] Normal     Vital Signs Last 24 Hrs  T(C): 36.6 (09 Jan 2025 11:57), Max: 36.6 (08 Jan 2025 21:31)  T(F): 97.9 (09 Jan 2025 11:57), Max: 97.9 (08 Jan 2025 21:31)  HR: 67 (09 Jan 2025 13:41) (50 - 69)  BP: 145/79 (09 Jan 2025 13:41) (125/58 - 152/77)  BP(mean): --  RR: 20 (09 Jan 2025 11:57) (16 - 20)  SpO2: 93% (09 Jan 2025 11:57) (93% - 98%)    Parameters below as of 09 Jan 2025 11:57  Patient On (Oxygen Delivery Method): room air        CAPILLARY BLOOD GLUCOSE      POCT Blood Glucose.: 316 mg/dL (09 Jan 2025 12:19)  POCT Blood Glucose.: 254 mg/dL (09 Jan 2025 08:09)      I&O's Summary    09 Jan 2025 07:01  -  09 Jan 2025 16:47  --------------------------------------------------------  IN: 600 mL / OUT: 700 mL / NET: -100 mL      PHYSICAL EXAM:  GENERAL:  [ x ] NAD, [ x ] Well appearing, [  ] Agitated, [  ] Drowsy, [  ] Lethargy, [  ] Confused   HEAD:  [ x ] Normal, [  ] Other  EYES:  [ x ] EOMI, [ x ] PERRLA, [ x ] Conjunctiva and sclera clear normal, [  ] Other, [  ] Pallor, [  ] Discharge  ENMT:  [ x ] Normal, [ x ] Moist mucous membranes, [  ] Good dentition, [  ] No thrush  NECK:  [ x ] Supple, [  ] No JVD, [ x ] Normal thyroid, [  ] Lymphadenopathy, [  ] Other  CHEST/LUNG:  [  ] Clear to auscultation bilaterally, [ x ] Breath Sounds decreased, [  ] Poor effort, [ x ] No rales, [ x ] bilateral rhonchi, [ x ] No wheezing  HEART:  [ x ] Regular rate and rhythm, [  ] Tachycardia, [  ] Bradycardia, [  ] Irregular, [ x ] No murmurs, No rubs, No gallops, [  ] PPM in place (Mfr:  )  ABDOMEN:  [ x ] Soft, [ x ] Nontender, [ x ] Nondistended, [ x ] No mass, [ x ] Bowel sounds present, [  ] Obese  NERVOUS SYSTEM:  [ x ] Alert & Oriented x3__, [ x ] Nonfocal, [  ] Confusion, [  ] Encephalopathic, [  ] Sedated, [  ] Unable to assess, [  ] Dementia, [  ] Other-  EXTREMITIES:  [ x ] 2+ Peripheral Pulses, No clubbing, No cyanosis,  [  ] Edema B/L lower EXT, [  ] PVD stasis skin changes B/L lower EXT, [  ] Wound [ x ] minimal tenderness to palpation of R lateral foot  LYMPH:  No lymphadenopathy noted  SKIN:  [ x ] No rashes or lesions, [  ] Pressure ulcers, [  ] Ecchymosis, [  ] Skin tears, [  ] Other    DIET: Diet, DASH/TLC:   Sodium & Cholesterol Restricted (01-08-25 @ 14:26)      LABS:                        11.3   11.72 )-----------( 255      ( 09 Jan 2025 05:16 )             35.6     09 Jan 2025 05:16    140    |  108    |  19     ----------------------------<  280    4.3     |  26     |  0.80     Ca    9.2        09 Jan 2025 05:16    TPro  6.5    /  Alb  3.2    /  TBili  0.3    /  DBili  x      /  AST  9      /  ALT  18     /  AlkPhos  78     09 Jan 2025 05:16    PT/INR - ( 08 Jan 2025 11:50 )   PT: 16.2 sec;   INR: 1.38 ratio         PTT - ( 08 Jan 2025 11:50 )  PTT:39.6 sec  Urinalysis Basic - ( 09 Jan 2025 05:16 )    Color: x / Appearance: x / SG: x / pH: x  Gluc: 280 mg/dL / Ketone: x  / Bili: x / Urobili: x   Blood: x / Protein: x / Nitrite: x   Leuk Esterase: x / RBC: x / WBC x   Sq Epi: x / Non Sq Epi: x / Bacteria: x      Culture Results:   No growth at 24 hours (01-08 @ 11:50)  Culture Results:   No growth at 24 hours (01-08 @ 11:45)    culture blood  -- .Blood BLOOD 01-08 @ 11:50    culture urine  --  01-08 @ 11:50  culture blood  -- .Blood BLOOD 01-08 @ 11:45    culture urine  --  01-08 @ 11:45          Urinalysis with Rflx Culture (collected 08 Jan 2025 13:48)    Culture - Blood (collected 08 Jan 2025 11:50)  Source: .Blood BLOOD  Preliminary Report (09 Jan 2025 16:01):    No growth at 24 hours    Culture - Blood (collected 08 Jan 2025 11:45)  Source: .Blood BLOOD  Preliminary Report (09 Jan 2025 16:01):    No growth at 24 hours       Anemia Panel:      Thyroid Panel:                RADIOLOGY & ADDITIONAL TESTS:   < from: CT Angio Abdomen and Pelvis w/ IV Cont (01.08.25 @ 14:50) >  FINDINGS:  CHEST:  LUNGS AND LARGE AIRWAYS: Patent central airways. There is no evidence of   consolidative pneumonia. Innumerable tree-in-bud nodules seen within the   lateral segment of the right middle lobe and throughout some of the   periphery of the right lower lobe are consistent with an ongoing   infectious/inflammatory process. Mild atelectatic changes at both lung   bases. Mild diffuse bronchial wall thickening, more significant in the   lower lobes. There is no evidence of consolidative pneumonia. No evidence   of a pneumothorax.  PLEURA: No pleural effusion.  VESSELS: Aortic calcifications. Coronary artery calcifications. The   aortic valve repair surgical changes. Type a dissection of the thoracic   aorta originating at the level of the brachiocephalic trunk (innominate   artery). The dissection flap and false lumen extends distally to the   infrarenal abdominal aorta, just above the level of the bifurcation. Some   of the thoracic and abdominal/pelvic lumbar arterial takeoff from the   false lumen. RAMIRO, SMA, celiac, and bilateral renal arteries originate   from the lumen. No evidence of and organ damage. No evidence of major   arterial vessel occlusion. constipation.  HEART: Borderline size. No pericardial effusion.  MEDIASTINUM AND MARANDA: No lymphadenopathy.  CHEST WALL AND LOWER NECK: Median sternotomysurgical changes without   complications.    ABDOMEN AND PELVIS:  LIVER: Steatosis.  BILE DUCTS: Normal caliber.  GALLBLADDER: Within normal limits.  SPLEEN: Within normal limits.  PANCREAS: Indeterminate hypodense lesions within the pancreatic tail,  with the largest measuring up to 2.5 cm in greatest dimension.  ADRENALS: Within normal limits.  KIDNEYS/URETERS: Indeterminate bilateral renal hypodensities are too   small to adequately characterize.    BLADDER: Within normal limits.  REPRODUCTIVE ORGANS: Hysterectomy.    BOWEL: Rectosigmoidectomy with left anterior pelvic wall colostomy.   Moderate-to-severe constipation. Some small bowel deep pelvic adhesions   are seen, without CT evident complications.  PERITONEUM/RETROPERITONEUM: Within normal limits.  VESSELS: Atherosclerotic changes. Read above.  LYMPH NODES: No lymphadenopathy.  ABDOMINAL WALL: Postsurgical changes.  BONES: Degenerative changes. Internal fixation surgical changes right   sacroiliac joint and pubic symphysis, without CT evident complications.   Diffuse osseous demineralization. Mild thoracolumbar scoliosis. Necrosis   of the bilateral femoral heads is highly suspected, right greater than   left, and may be further assessed and confirmed with outpatient   contrast-enhanced MRI bilateral hips. No acute osseous abnormality   detected. No suspicious bone lesions.    IMPRESSION:  Type A aortic dissection extending from the proximal aortic arch,   innominate artery level, to the infrarenal abdominal aorta segment, above   the bifurcation. There are no complications of vessel occlusion or and   organ damage. Read above text for additional findings and detailed   explanations.        --- End of Report ---      < end of copied text >      < from: Xray Chest 1 View-PORTABLE IMMEDIATE (01.08.25 @ 11:35) >  INTERPRETATION:  AP chest on January 8, 2025 at 11:27 AM. 2 images.   Patient is short of breath for 2 days.    COMPARISON: None available.    Heart enlargement. Sternotomy and TAVR aortic valve noted.    Slight increased interstitial markings may represent minimal CHF.    Mild right thoracic curve noted.    IMPRESSION: Heart enlargement, sternotomy, and TAVR aortic valve.   Possible slight interstitial CHF.    --- End of Report ---      < end of copied text >    < from: Xray Ankle Complete 3 Views, Right (01.03.25 @ 02:16) >  INTERPRETATION:  Clinical information: Right ankle/foot pain and swelling.    3 views each of the right ankle and right foot.    No acute fracture or dislocation is noted.    Soft tissue vascular calcification is present.    IMPRESSION:    Findings as discussed above.    --- End of Report ---        < end of copied text >        HEALTH ISSUES - PROBLEM Dx:  COPD exacerbation    Asthma    Epilepsy    HTN (hypertension)    T2DM (type 2 diabetes mellitus)    Chronic atrial fibrillation    Neuropathy    History of aortic aneurysm    Need for prophylactic measure    History of TIAs    Type 2 diabetes mellitus with hyperglycemia          Consultant(s) Notes Reviewed:  [ x ] YES     Care Discussed with [ x ] Consultants, [ x ] Patient, [ x ] Family, [  ] HCP, [ x ] , [  ] Social Service, [ x ] RN, [  ] Physical Therapy, [  ] Palliative Care Team  DVT PPX: [  ] Lovenox, [  ] SC Heparin, [  ] Coumadin, [  ] Xarelto, [  ] Eliquis, [  ] Pradaxa, [  ] IV Heparin drip, [  ] SCD, [  ] Ambulation, [  ] Contraindicated 2/2 GI Bleed, [  ] Contraindicated 2/2  Bleed, [  ] Contraindicated 2/2 Brain Bleed  Advanced Directive: [  ] None, [  ] DNR/DNI Patient is a 74y old  Female who presents with a chief complaint of COPD Exacerbation (09 Jan 2025 12:51)    HPI:  74yFemale pmhx of Epilepsy on Keppra, COPD, asthma on chronic steroids, bronchiectasis, tracheomalacia s/p tracheloplasty HTN, Hx of dissection of descending thoracic aorta, , hx of aortic aneurysm,  Type B 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  Evaluated by Dr. Antonio,  Type A dissection s/p bioAVR with Bental procedure (9/2022), severe AS s/p TAVR (9/10/2023), TIA's, DVT Afib on Eliquis, T2DM, Colon cancer, neuropathy, presents to the ED for shortness of breath. Patient reports for the past 1-2 days having increased chest pressure out of her baseline, states she has also had cough, wheezing and shortness of breath with minimal relief after using her normal regimen of inhalers and nebulizers. Patient denies any fevers since onset of symptoms, however states her legs have been on/off swelling for many years. Patient of note has had multiple episodes and hospital visits for PNA over the past 2-3 months recent discharge on 12/12/24 at [facility] treated with ertapanem premedicated with benadryl. Pt otherwise denies any other concerns or complaints at this time, no fever, headache, cp, n/v/d.    ED course  Vitals: T 98.1F, HR 54, /64, RR 18, SpO2 99% on 2L NC  Significant labs: WBC 10.25, Hgb 11.7, Hct 37.6  Na 141, K 5.1, Cr .83  Lactate 2.5  UA Trace leuk esterase  Imaging:   CT CHEST --> Limited by motion and absence of IV contrast.    Displaced intimal calcifications in the mid to distal descending thoracic   aorta (602:65) consistent with dissection.  Right upper lobe groundglass opacities versus motion artifact. No focal   consolidation. Trace bilateral pleural effusions.  1.4 cm pleural-based right lower lobe nodule.   Cardiomegaly. Aortic valve repair.   CT Angio C/A/P    IMPRESSION:  Type A aortic dissection extending from the proximal aortic arch,   innominate artery level, to the infrarenal abdominal aorta segment, above   the bifurcation. There are no complications of vessel occlusion or and   organ damage. Read above text for additional findings and detailed   explanations.      CXR --> interstitial chf  EKG: RBBB  In ED patient given: doxy 100 1x, duoneb x2, solumedrol x1, benadryl x1, 1L NS x1  ED MD d/w CT Sx at [facility] about CT Angio C/A/P abnormal  results- no surgical intervention , medical management    (08 Jan 2025 14:39)    INTERVAL HPI:  1/9/25: Pt was seen and examined at bedside. Pt states she has a cough with yellow-green sputum. Was previously admitted at [facility] for COPD exacerbation and treated with "ertapenem and benadryl" due to her long list of allergies. Takes both miralax and mineral oil to avoid constipation. Reports that she has shortness of breath with minimal exertion. On PO Zithromax    OVERNIGHT EVENTS: No acute overnight events.     Home Medications:  Breo Ellipta 200 mcg-25 mcg/inh inhalation powder: 1 puff(s) inhaled once a day (08 Jan 2025 18:59)  budesonide 1 mg/2 mL inhalation suspension: 2 milliliter(s) by nebulizer once a day (08 Jan 2025 18:59)  clopidogrel 75 mg oral tablet: 1 tab(s) orally once a day (08 Jan 2025 18:59)  DuoNeb 0.5 mg-2.5 mg/3 mL inhalation solution: 3 milliliter(s) by nebulizer once a day (08 Jan 2025 18:59)  Eliquis 5 mg oral tablet: 1 tab(s) orally 2 times a day (08 Jan 2025 18:59)  ferrous sulfate: 325 milligram(s) orally once a day (08 Jan 2025 18:59)  gabapentin 100 mg oral capsule: 200 milligram(s) orally 3 times a day (08 Jan 2025 18:59)  glycopyrrolate 1 mg oral tablet: 1 tab(s) orally 3 times a day (08 Jan 2025 18:59)  HumaLOG 100 units/mL injectable solution: injectable sliding scale &lt;100 3u, 101-140 5u, 141-200 7u (08 Jan 2025 18:59)  Keppra 500 mg oral tablet: 2 tab(s) orally 2 times a day (08 Jan 2025 18:59)  labetalol 100 mg oral tablet: 1 tab(s) orally every 8 hours (08 Jan 2025 18:59)  Lantus 100 units/mL subcutaneous solution: 18 unit(s) subcutaneous once a day (08 Jan 2025 18:59)  Lantus 100 units/mL subcutaneous solution: 30 unit(s) subcutaneous once a day (at bedtime) (08 Jan 2025 18:59)  megestrol 20 mg oral tablet: 1 tab(s) orally once a day (08 Jan 2025 18:59)  methylPREDNISolone 8 mg oral tablet: 1 tab(s) orally 2 times a day (08 Jan 2025 18:59)  mexiletine 200 mg oral capsule: 1 cap(s) orally 3 times a day (08 Jan 2025 18:59)  NIFEdipine 30 mg oral tablet, extended release: 1 tab(s) orally once a day (08 Jan 2025 18:59)  pantoprazole 40 mg oral delayed release tablet: 1 tab(s) orally once a day (08 Jan 2025 18:59)  Perforomist 20 mcg/2 mL inhalation solution: 2 milliliter(s) inhaled once a day (08 Jan 2025 18:59)  rosuvastatin 5 mg oral tablet: 1 tab(s) orally once a day (at bedtime) (08 Jan 2025 18:59)  sertraline 50 mg oral tablet: 1 tab(s) orally once a day (08 Jan 2025 18:59)  Tezspire Pre-filled Pen 210 mg/1.91 mL subcutaneous solution: 210 milligram(s) subcutaneously (08 Jan 2025 18:59)      MEDICATIONS  (STANDING):  azithromycin   Tablet 500 milliGRAM(s) Oral once  dextrose 5%. 1000 milliLiter(s) (50 mL/Hr) IV Continuous <Continuous>  dextrose 5%. 1000 milliLiter(s) (100 mL/Hr) IV Continuous <Continuous>  dextrose 50% Injectable 25 Gram(s) IV Push once  dextrose 50% Injectable 12.5 Gram(s) IV Push once  dextrose 50% Injectable 25 Gram(s) IV Push once  fluticasone propionate/ salmeterol 250-50 MICROgram(s) Diskus 1 Dose(s) Inhalation two times a day  gabapentin 200 milliGRAM(s) Oral three times a day  glucagon  Injectable 1 milliGRAM(s) IntraMuscular once  influenza  Vaccine (HIGH DOSE) 0.5 milliLiter(s) IntraMuscular once  insulin glargine Injectable (LANTUS) 25 Unit(s) SubCutaneous at bedtime  insulin lispro (ADMELOG) corrective regimen sliding scale   SubCutaneous three times a day before meals  insulin lispro Injectable (ADMELOG) 2 Unit(s) SubCutaneous three times a day before meals  labetalol 100 milliGRAM(s) Oral every 8 hours  levETIRAcetam 1000 milliGRAM(s) Oral two times a day  megestrol 20 milliGRAM(s) Oral daily  mexiletine 200 milliGRAM(s) Oral three times a day  mineral oil 10 milliLiter(s) Oral two times a day  NIFEdipine XL 30 milliGRAM(s) Oral daily  polyethylene glycol 3350 17 Gram(s) Oral two times a day  predniSONE   Tablet 20 milliGRAM(s) Oral daily  rosuvastatin 5 milliGRAM(s) Oral at bedtime  sertraline 50 milliGRAM(s) Oral daily    MEDICATIONS  (PRN):  albuterol    90 MICROgram(s) HFA Inhaler 2 Puff(s) Inhalation every 6 hours PRN Shortness of Breath and/or Wheezing  dextrose Oral Gel 15 Gram(s) Oral once PRN Blood Glucose LESS THAN 70 milliGRAM(s)/deciliter      aspirin (Unknown)  ampicillin (Unknown)  Valium (Unknown)  codeine (Unknown)  cefepime (Short breath (Severe))  Percocet (Unknown)  penicillin (Unknown)  Avelox (Unknown)      Social History:  Lives at home with daughter, requires assistance to ambulate with walker  No alcohol use, never smoker  Not working (08 Jan 2025 14:39)      REVIEW OF SYSTEMS:  CONSTITUTIONAL: No fever, No chills, No fatigue, No myalgia, No Body ache, No Weakness  EYES: No eye pain,  No visual disturbances, No discharge, No Redness  ENMT: No ear pain, No nose bleed, No vertigo; No sinus pain, No throat pain, No Congestion  NECK: No pain, No stiffness  RESPIRATORY: +cough, +wheezing, No hemoptysis, +shortness of breath with minimal exertion  CARDIOVASCULAR: No chest pain, No palpitations  GASTROINTESTINAL: No abdominal pain, No epigastric pain. No nausea, No vomiting, No diarrhea, No constipation; [ x ] BM-  GENITOURINARY: No dysuria, No frequency, No urgency, No hematuria, No incontinence  NEUROLOGICAL: No headaches, No dizziness, No numbness, No tingling, No tremors, No weakness  EXTREMITIES: No Swelling, No Pain, No Edema  SKIN: [ x ] No itching, burning, rashes, or lesions   MUSCULOSKELETAL: No joint pain, No joint swelling; No muscle pain, No back pain, No extremity pain  PSYCHIATRIC: No depression, No anxiety, No mood swings, No difficulty sleeping at night  PAIN SCALE: [ x ] None  [  ] Other-  ROS Unable to obtain due to: [  ] Dementia  [  ] Lethargy  [  ] Sedated  [  ] Non verbal  REST OF REVIEW OF SYSTEMS: [ x ] Normal     Vital Signs Last 24 Hrs  T(C): 36.6 (09 Jan 2025 11:57), Max: 36.6 (08 Jan 2025 21:31)  T(F): 97.9 (09 Jan 2025 11:57), Max: 97.9 (08 Jan 2025 21:31)  HR: 67 (09 Jan 2025 13:41) (50 - 69)  BP: 145/79 (09 Jan 2025 13:41) (125/58 - 152/77)  BP(mean): --  RR: 20 (09 Jan 2025 11:57) (16 - 20)  SpO2: 93% (09 Jan 2025 11:57) (93% - 98%)    Parameters below as of 09 Jan 2025 11:57  Patient On (Oxygen Delivery Method): room air        CAPILLARY BLOOD GLUCOSE      POCT Blood Glucose.: 316 mg/dL (09 Jan 2025 12:19)  POCT Blood Glucose.: 254 mg/dL (09 Jan 2025 08:09)      I&O's Summary    09 Jan 2025 07:01  -  09 Jan 2025 16:47  --------------------------------------------------------  IN: 600 mL / OUT: 700 mL / NET: -100 mL      PHYSICAL EXAM:  GENERAL:  [ x ] NAD, [ x ] Well appearing, [  ] Agitated, [  ] Drowsy, [  ] Lethargy, [  ] Confused   HEAD:  [ x ] Normal, [  ] Other  EYES:  [ x ] EOMI, [ x ] PERRLA, [ x ] Conjunctiva and sclera clear normal, [  ] Other, [  ] Pallor, [  ] Discharge  ENMT:  [ x ] Normal, [ x ] Moist mucous membranes, [  ] Good dentition, [  ] No thrush  NECK:  [ x ] Supple, [  ] No JVD, [ x ] Normal thyroid, [  ] Lymphadenopathy, [  ] Other  CHEST/LUNG:  [  ] Clear to auscultation bilaterally, [ x ] Breath Sounds decreased, [x  ] Poor effort, [ x ] No rales, [ x ] bilateral rhonchi, [ x ] No wheezing  HEART:  [ x ] Regular rate and rhythm, [  ] Tachycardia, [  ] Bradycardia, [  ] Irregular, [ x ] No murmurs, No rubs, No gallops, [  ] PPM in place (Mfr:  )  ABDOMEN:  [ x ] Soft, [ x ] Nontender, [ x ] Nondistended, [ x ] No mass, [ x ] Bowel sounds present, [  ] Obese  NERVOUS SYSTEM:  [ x ] Alert & Oriented x3__, [ x ] Nonfocal, [  ] Confusion, [  ] Encephalopathic, [  ] Sedated, [  ] Unable to assess, [  ] Dementia, [  ] Other-  EXTREMITIES:  [ x ] 2+ Peripheral Pulses, No clubbing, No cyanosis,  [  ] Edema B/L lower EXT, [ x ] PVD stasis skin changes B/L lower EXT, [  ] Wound [ x ] minimal tenderness to palpation of R lateral foot  LYMPH:  No lymphadenopathy noted  SKIN:  [ x ] No rashes or lesions, [  ] Pressure ulcers, [ x ] Ecchymosis,- upper ext [  ] Skin tears, [  ] Other    DIET: Diet, DASH/TLC:   Sodium & Cholesterol Restricted (01-08-25 @ 14:26)      LABS:                        11.3   11.72 )-----------( 255      ( 09 Jan 2025 05:16 )             35.6     09 Jan 2025 05:16    140    |  108    |  19     ----------------------------<  280    4.3     |  26     |  0.80     Ca    9.2        09 Jan 2025 05:16    TPro  6.5    /  Alb  3.2    /  TBili  0.3    /  DBili  x      /  AST  9      /  ALT  18     /  AlkPhos  78     09 Jan 2025 05:16    PT/INR - ( 08 Jan 2025 11:50 )   PT: 16.2 sec;   INR: 1.38 ratio         PTT - ( 08 Jan 2025 11:50 )  PTT:39.6 sec  Urinalysis Basic - ( 09 Jan 2025 05:16 )    Color: x / Appearance: x / SG: x / pH: x  Gluc: 280 mg/dL / Ketone: x  / Bili: x / Urobili: x   Blood: x / Protein: x / Nitrite: x   Leuk Esterase: x / RBC: x / WBC x   Sq Epi: x / Non Sq Epi: x / Bacteria: x      Culture Results:   No growth at 24 hours (01-08 @ 11:50)  Culture Results:   No growth at 24 hours (01-08 @ 11:45)    culture blood  -- .Blood BLOOD 01-08 @ 11:50    culture urine  --  01-08 @ 11:50  culture blood  -- .Blood BLOOD 01-08 @ 11:45    culture urine  --  01-08 @ 11:45          Urinalysis with Rflx Culture (collected 08 Jan 2025 13:48)    Culture - Blood (collected 08 Jan 2025 11:50)  Source: .Blood BLOOD  Preliminary Report (09 Jan 2025 16:01):    No growth at 24 hours    Culture - Blood (collected 08 Jan 2025 11:45)  Source: .Blood BLOOD  Preliminary Report (09 Jan 2025 16:01):    No growth at 24 hours      RADIOLOGY & ADDITIONAL TESTS:   < from: CT Angio Abdomen and Pelvis w/ IV Cont (01.08.25 @ 14:50) >  FINDINGS:  CHEST:  LUNGS AND LARGE AIRWAYS: Patent central airways. There is no evidence of   consolidative pneumonia. Innumerable tree-in-bud nodules seen within the   lateral segment of the right middle lobe and throughout some of the   periphery of the right lower lobe are consistent with an ongoing   infectious/inflammatory process. Mild atelectatic changes at both lung   bases. Mild diffuse bronchial wall thickening, more significant in the   lower lobes. There is no evidence of consolidative pneumonia. No evidence   of a pneumothorax.  PLEURA: No pleural effusion.  VESSELS: Aortic calcifications. Coronary artery calcifications. The   aortic valve repair surgical changes. Type a dissection of the thoracic   aorta originating at the level of the brachiocephalic trunk (innominate   artery). The dissection flap and false lumen extends distally to the   infrarenal abdominal aorta, just above the level of the bifurcation. Some   of the thoracic and abdominal/pelvic lumbar arterial takeoff from the   false lumen. RAMIRO, SMA, celiac, and bilateral renal arteries originate   from the lumen. No evidence of and organ damage. No evidence of major   arterial vessel occlusion. constipation.  HEART: Borderline size. No pericardial effusion.  MEDIASTINUM AND MARANDA: No lymphadenopathy.  CHEST WALL AND LOWER NECK: Median sternotomysurgical changes without   complications.    ABDOMEN AND PELVIS:  LIVER: Steatosis.  BILE DUCTS: Normal caliber.  GALLBLADDER: Within normal limits.  SPLEEN: Within normal limits.  PANCREAS: Indeterminate hypodense lesions within the pancreatic tail,  with the largest measuring up to 2.5 cm in greatest dimension.  ADRENALS: Within normal limits.  KIDNEYS/URETERS: Indeterminate bilateral renal hypodensities are too   small to adequately characterize.    BLADDER: Within normal limits.  REPRODUCTIVE ORGANS: Hysterectomy.    BOWEL: Rectosigmoidectomy with left anterior pelvic wall colostomy.   Moderate-to-severe constipation. Some small bowel deep pelvic adhesions   are seen, without CT evident complications.  PERITONEUM/RETROPERITONEUM: Within normal limits.  VESSELS: Atherosclerotic changes. Read above.  LYMPH NODES: No lymphadenopathy.  ABDOMINAL WALL: Postsurgical changes.  BONES: Degenerative changes. Internal fixation surgical changes right   sacroiliac joint and pubic symphysis, without CT evident complications.   Diffuse osseous demineralization. Mild thoracolumbar scoliosis. Necrosis   of the bilateral femoral heads is highly suspected, right greater than   left, and may be further assessed and confirmed with outpatient   contrast-enhanced MRI bilateral hips. No acute osseous abnormality   detected. No suspicious bone lesions.    IMPRESSION:  Type A aortic dissection extending from the proximal aortic arch,   innominate artery level, to the infrarenal abdominal aorta segment, above   the bifurcation. There are no complications of vessel occlusion or and   organ damage. Read above text for additional findings and detailed   explanations.        --- End of Report ---      < end of copied text >      < from: Xray Chest 1 View-PORTABLE IMMEDIATE (01.08.25 @ 11:35) >  INTERPRETATION:  AP chest on January 8, 2025 at 11:27 AM. 2 images.   Patient is short of breath for 2 days.    COMPARISON: None available.    Heart enlargement. Sternotomy and TAVR aortic valve noted.    Slight increased interstitial markings may represent minimal CHF.    Mild right thoracic curve noted.    IMPRESSION: Heart enlargement, sternotomy, and TAVR aortic valve.   Possible slight interstitial CHF.    --- End of Report ---      < end of copied text >    < from: Xray Ankle Complete 3 Views, Right (01.03.25 @ 02:16) >  INTERPRETATION:  Clinical information: Right ankle/foot pain and swelling.    3 views each of the right ankle and right foot.    No acute fracture or dislocation is noted.    Soft tissue vascular calcification is present.    IMPRESSION:    Findings as discussed above.    --- End of Report ---        < end of copied text >        HEALTH ISSUES - PROBLEM Dx:  COPD exacerbation    Asthma    Epilepsy    HTN (hypertension)    T2DM (type 2 diabetes mellitus)    Chronic atrial fibrillation    Neuropathy    History of aortic aneurysm    Need for prophylactic measure    History of TIAs    Type 2 diabetes mellitus with hyperglycemia          Consultant(s) Notes Reviewed:  [ x ] YES     Care Discussed with [ x ] Consultants, [ x ] Patient, [ x ] Family- dtr, [  ] HCP, [ x ] , [  ] Social Service, [ x ] RN, [  ] Physical Therapy, [  ] Palliative Care Team  DVT PPX: [  x] Lovenox, [  ] SC Heparin, [  ] Coumadin, [  ] Xarelto, [  ] Eliquis, [  ] Pradaxa, [  ] IV Heparin drip, [  ] SCD, [  ] Ambulation, [  ] Contraindicated 2/2 GI Bleed, [  ] Contraindicated 2/2  Bleed, [  ] Contraindicated 2/2 Brain Bleed  Advanced Directive: [  ] None, [ x ] DNR/DNI Patient is a 74y old  Female who presents with a chief complaint of COPD Exacerbation (09 Jan 2025 12:51)    HPI:  74yFemale pmhx of Epilepsy on Keppra, COPD, asthma on chronic steroids, bronchiectasis, tracheomalacia s/p tracheloplasty HTN, Hx of dissection of descending thoracic aorta, , hx of aortic aneurysm,  Type B 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  Evaluated by Dr. Antonio,  Type A dissection s/p bioAVR with Bental procedure (9/2022), severe AS s/p TAVR (9/10/2023), TIA's, DVT Afib on Eliquis, T2DM, Colon cancer, neuropathy, presents to the ED for shortness of breath. Patient reports for the past 1-2 days having increased chest pressure out of her baseline, states she has also had cough, wheezing and shortness of breath with minimal relief after using her normal regimen of inhalers and nebulizers. Patient denies any fevers since onset of symptoms, however states her legs have been on/off swelling for many years. Patient of note has had multiple episodes and hospital visits for PNA over the past 2-3 months recent discharge on 12/12/24 at [facility] treated with ertapanem premedicated with benadryl. Pt otherwise denies any other concerns or complaints at this time, no fever, headache, cp, n/v/d.    ED course  Vitals: T 98.1F, HR 54, /64, RR 18, SpO2 99% on 2L NC  Significant labs: WBC 10.25, Hgb 11.7, Hct 37.6  Na 141, K 5.1, Cr .83  Lactate 2.5  UA Trace leuk esterase  Imaging:   CT CHEST --> Limited by motion and absence of IV contrast.    Displaced intimal calcifications in the mid to distal descending thoracic   aorta (602:65) consistent with dissection.  Right upper lobe groundglass opacities versus motion artifact. No focal   consolidation. Trace bilateral pleural effusions.  1.4 cm pleural-based right lower lobe nodule.   Cardiomegaly. Aortic valve repair.   CT Angio C/A/P    IMPRESSION:  Type A aortic dissection extending from the proximal aortic arch,   innominate artery level, to the infrarenal abdominal aorta segment, above   the bifurcation. There are no complications of vessel occlusion or and   organ damage. Read above text for additional findings and detailed   explanations.      CXR --> interstitial chf  EKG: RBBB  In ED patient given: doxy 100 1x, duoneb x2, solumedrol x1, benadryl x1, 1L NS x1  ED MD d/w CT Sx at [facility] about CT Angio C/A/P abnormal  results- no surgical intervention , medical management    (08 Jan 2025 14:39)    INTERVAL HPI:  1/9/25: Pt was seen and examined at bedside. Pt states she has a cough with yellow-green sputum. Was previously admitted at [place] for COPD exacerbation and treated with "ertapenem and benadryl" due to her long list of allergies. Takes both miralax and mineral oil to avoid constipation. Reports that she has shortness of breath with minimal exertion. On PO Zithromax    OVERNIGHT EVENTS: No acute overnight events.     Home Medications:  Breo Ellipta 200 mcg-25 mcg/inh inhalation powder: 1 puff(s) inhaled once a day (08 Jan 2025 18:59)  budesonide 1 mg/2 mL inhalation suspension: 2 milliliter(s) by nebulizer once a day (08 Jan 2025 18:59)  clopidogrel 75 mg oral tablet: 1 tab(s) orally once a day (08 Jan 2025 18:59)  DuoNeb 0.5 mg-2.5 mg/3 mL inhalation solution: 3 milliliter(s) by nebulizer once a day (08 Jan 2025 18:59)  Eliquis 5 mg oral tablet: 1 tab(s) orally 2 times a day (08 Jan 2025 18:59)  ferrous sulfate: 325 milligram(s) orally once a day (08 Jan 2025 18:59)  gabapentin 100 mg oral capsule: 200 milligram(s) orally 3 times a day (08 Jan 2025 18:59)  glycopyrrolate 1 mg oral tablet: 1 tab(s) orally 3 times a day (08 Jan 2025 18:59)  HumaLOG 100 units/mL injectable solution: injectable sliding scale &lt;100 3u, 101-140 5u, 141-200 7u (08 Jan 2025 18:59)  Keppra 500 mg oral tablet: 2 tab(s) orally 2 times a day (08 Jan 2025 18:59)  labetalol 100 mg oral tablet: 1 tab(s) orally every 8 hours (08 Jan 2025 18:59)  Lantus 100 units/mL subcutaneous solution: 18 unit(s) subcutaneous once a day (08 Jan 2025 18:59)  Lantus 100 units/mL subcutaneous solution: 30 unit(s) subcutaneous once a day (at bedtime) (08 Jan 2025 18:59)  megestrol 20 mg oral tablet: 1 tab(s) orally once a day (08 Jan 2025 18:59)  methylPREDNISolone 8 mg oral tablet: 1 tab(s) orally 2 times a day (08 Jan 2025 18:59)  mexiletine 200 mg oral capsule: 1 cap(s) orally 3 times a day (08 Jan 2025 18:59)  NIFEdipine 30 mg oral tablet, extended release: 1 tab(s) orally once a day (08 Jan 2025 18:59)  pantoprazole 40 mg oral delayed release tablet: 1 tab(s) orally once a day (08 Jan 2025 18:59)  Perforomist 20 mcg/2 mL inhalation solution: 2 milliliter(s) inhaled once a day (08 Jan 2025 18:59)  rosuvastatin 5 mg oral tablet: 1 tab(s) orally once a day (at bedtime) (08 Jan 2025 18:59)  sertraline 50 mg oral tablet: 1 tab(s) orally once a day (08 Jan 2025 18:59)  [place] Pre-filled Pen 210 mg/1.91 mL subcutaneous solution: 210 milligram(s) subcutaneously (08 Jan 2025 18:59)      MEDICATIONS  (STANDING):  azithromycin   Tablet 500 milliGRAM(s) Oral once  dextrose 5%. 1000 milliLiter(s) (50 mL/Hr) IV Continuous <Continuous>  dextrose 5%. 1000 milliLiter(s) (100 mL/Hr) IV Continuous <Continuous>  dextrose 50% Injectable 25 Gram(s) IV Push once  dextrose 50% Injectable 12.5 Gram(s) IV Push once  dextrose 50% Injectable 25 Gram(s) IV Push once  fluticasone propionate/ salmeterol 250-50 MICROgram(s) Diskus 1 Dose(s) Inhalation two times a day  gabapentin 200 milliGRAM(s) Oral three times a day  glucagon  Injectable 1 milliGRAM(s) IntraMuscular once  influenza  Vaccine (HIGH DOSE) 0.5 milliLiter(s) IntraMuscular once  insulin glargine Injectable (LANTUS) 25 Unit(s) SubCutaneous at bedtime  insulin lispro (ADMELOG) corrective regimen sliding scale   SubCutaneous three times a day before meals  insulin lispro Injectable (ADMELOG) 2 Unit(s) SubCutaneous three times a day before meals  labetalol 100 milliGRAM(s) Oral every 8 hours  levETIRAcetam 1000 milliGRAM(s) Oral two times a day  megestrol 20 milliGRAM(s) Oral daily  mexiletine 200 milliGRAM(s) Oral three times a day  mineral oil 10 milliLiter(s) Oral two times a day  NIFEdipine XL 30 milliGRAM(s) Oral daily  polyethylene glycol 3350 17 Gram(s) Oral two times a day  predniSONE   Tablet 20 milliGRAM(s) Oral daily  rosuvastatin 5 milliGRAM(s) Oral at bedtime  sertraline 50 milliGRAM(s) Oral daily    MEDICATIONS  (PRN):  albuterol    90 MICROgram(s) HFA Inhaler 2 Puff(s) Inhalation every 6 hours PRN Shortness of Breath and/or Wheezing  dextrose Oral Gel 15 Gram(s) Oral once PRN Blood Glucose LESS THAN 70 milliGRAM(s)/deciliter      aspirin (Unknown)  ampicillin (Unknown)  Valium (Unknown)  codeine (Unknown)  cefepime (Short breath (Severe))  Percocet (Unknown)  penicillin (Unknown)  Avelox (Unknown)      Social History:  Lives at home with daughter, requires assistance to ambulate with walker  No alcohol use, never smoker  Not working (08 Jan 2025 14:39)      REVIEW OF SYSTEMS:  CONSTITUTIONAL: No fever, No chills, No fatigue, No myalgia, No Body ache, No Weakness  EYES: No eye pain,  No visual disturbances, No discharge, No Redness  ENMT: No ear pain, No nose bleed, No vertigo; No sinus pain, No throat pain, No Congestion  NECK: No pain, No stiffness  RESPIRATORY: +cough, +wheezing, No hemoptysis, +shortness of breath with minimal exertion  CARDIOVASCULAR: No chest pain, No palpitations  GASTROINTESTINAL: No abdominal pain, No epigastric pain. No nausea, No vomiting, No diarrhea, No constipation; [ x ] BM-  GENITOURINARY: No dysuria, No frequency, No urgency, No hematuria, No incontinence  NEUROLOGICAL: No headaches, No dizziness, No numbness, No tingling, No tremors, No weakness  EXTREMITIES: No Swelling, No Pain, No Edema  SKIN: [ x ] No itching, burning, rashes, or lesions   MUSCULOSKELETAL: No joint pain, No joint swelling; No muscle pain, No back pain, No extremity pain  PSYCHIATRIC: No depression, No anxiety, No mood swings, No difficulty sleeping at night  PAIN SCALE: [ x ] None  [  ] Other-  ROS Unable to obtain due to: [  ] Dementia  [  ] Lethargy  [  ] Sedated  [  ] Non verbal  REST OF REVIEW OF SYSTEMS: [ x ] Normal     Vital Signs Last 24 Hrs  T(C): 36.6 (09 Jan 2025 11:57), Max: 36.6 (08 Jan 2025 21:31)  T(F): 97.9 (09 Jan 2025 11:57), Max: 97.9 (08 Jan 2025 21:31)  HR: 67 (09 Jan 2025 13:41) (50 - 69)  BP: 145/79 (09 Jan 2025 13:41) (125/58 - 152/77)  BP(mean): --  RR: 20 (09 Jan 2025 11:57) (16 - 20)  SpO2: 93% (09 Jan 2025 11:57) (93% - 98%)    Parameters below as of 09 Jan 2025 11:57  Patient On (Oxygen Delivery Method): room air        CAPILLARY BLOOD GLUCOSE      POCT Blood Glucose.: 316 mg/dL (09 Jan 2025 12:19)  POCT Blood Glucose.: 254 mg/dL (09 Jan 2025 08:09)      I&O's Summary    09 Jan 2025 07:01  -  09 Jan 2025 16:47  --------------------------------------------------------  IN: 600 mL / OUT: 700 mL / NET: -100 mL      PHYSICAL EXAM:  GENERAL:  [ x ] NAD, [ x ] Well appearing, [  ] Agitated, [  ] Drowsy, [  ] Lethargy, [  ] Confused   HEAD:  [ x ] Normal, [  ] Other  EYES:  [ x ] EOMI, [ x ] PERRLA, [ x ] Conjunctiva and sclera clear normal, [  ] Other, [  ] Pallor, [  ] Discharge  ENMT:  [ x ] Normal, [ x ] Moist mucous membranes, [  ] Good dentition, [x  ] No thrush  NECK:  [ x ] Supple, [ x ] No JVD, [ x ] Normal thyroid, [  ] Lymphadenopathy, [  ] Other  CHEST/LUNG:  [ x ] Clear to auscultation bilaterally, [ x ] Breath Sounds decreased, [x  ] Poor effort, [ x ] No rales, [ x ] bilateral rhonchi, [ x ] No wheezing  HEART:  [ x ] Regular rate and rhythm, [  ] Tachycardia, [  ] Bradycardia, [  ] Irregular, [ x ] No murmurs, No rubs, No gallops, [  ] PPM in place (Mfr:  )  ABDOMEN:  [ x ] Soft, [ x ] Nontender, [ x ] Nondistended, [ x ] No mass, [ x ] Bowel sounds present, [x  ] Obese+ Colostomy Bag  NERVOUS SYSTEM:  [ x ] Alert & Oriented x3__, [ x ] Nonfocal, [  ] Confusion, [  ] Encephalopathic, [  ] Sedated, [  ] Unable to assess, [  ] Dementia, [  ] Other-  EXTREMITIES:  [ x ] 2+ Peripheral Pulses, No clubbing, No cyanosis,  [  ] Edema B/L lower EXT, [ x ] PVD stasis skin changes B/L lower EXT, [  ] Wound [ x ] minimal tenderness to palpation of R lateral foot  LYMPH:  No lymphadenopathy noted  SKIN:  [ x ] No rashes or lesions, [  ] Pressure ulcers, [ x ] Ecchymosis,- upper ext [  ] Skin tears, [  ] Other    DIET: Diet, DASH/TLC:   Sodium & Cholesterol Restricted (01-08-25 @ 14:26)      LABS:                        11.3   11.72 )-----------( 255      ( 09 Jan 2025 05:16 )             35.6     09 Jan 2025 05:16    140    |  108    |  19     ----------------------------<  280    4.3     |  26     |  0.80     Ca    9.2        09 Jan 2025 05:16    TPro  6.5    /  Alb  3.2    /  TBili  0.3    /  DBili  x      /  AST  9      /  ALT  18     /  AlkPhos  78     09 Jan 2025 05:16    PT/INR - ( 08 Jan 2025 11:50 )   PT: 16.2 sec;   INR: 1.38 ratio         PTT - ( 08 Jan 2025 11:50 )  PTT:39.6 sec  Urinalysis Basic - ( 09 Jan 2025 05:16 )    Color: x / Appearance: x / SG: x / pH: x  Gluc: 280 mg/dL / Ketone: x  / Bili: x / Urobili: x   Blood: x / Protein: x / Nitrite: x   Leuk Esterase: x / RBC: x / WBC x   Sq Epi: x / Non Sq Epi: x / Bacteria: x      Culture Results:   No growth at 24 hours (01-08 @ 11:50)  Culture Results:   No growth at 24 hours (01-08 @ 11:45)    culture blood  -- .Blood BLOOD 01-08 @ 11:50    culture urine  --  01-08 @ 11:50  culture blood  -- .Blood BLOOD 01-08 @ 11:45    culture urine  --  01-08 @ 11:45    Urinalysis with Rflx Culture (collected 08 Jan 2025 13:48)    Culture - Blood (collected 08 Jan 2025 11:50)  Source: .Blood BLOOD  Preliminary Report (09 Jan 2025 16:01):    No growth at 24 hours    Culture - Blood (collected 08 Jan 2025 11:45)  Source: .Blood BLOOD  Preliminary Report (09 Jan 2025 16:01):    No growth at 24 hours      RADIOLOGY & ADDITIONAL TESTS:   < from: CT Angio Abdomen and Pelvis w/ IV Cont (01.08.25 @ 14:50) >  FINDINGS:  CHEST:  LUNGS AND LARGE AIRWAYS: Patent central airways. There is no evidence of   consolidative pneumonia. Innumerable tree-in-bud nodules seen within the   lateral segment of the right middle lobe and throughout some of the   periphery of the right lower lobe are consistent with an ongoing   infectious/inflammatory process. Mild atelectatic changes at both lung   bases. Mild diffuse bronchial wall thickening, more significant in the   lower lobes. There is no evidence of consolidative pneumonia. No evidence   of a pneumothorax.  PLEURA: No pleural effusion.  VESSELS: Aortic calcifications. Coronary artery calcifications. The   aortic valve repair surgical changes. Type a dissection of the thoracic   aorta originating at the level of the brachiocephalic trunk (innominate   artery). The dissection flap and false lumen extends distally to the   infrarenal abdominal aorta, just above the level of the bifurcation. Some   of the thoracic and abdominal/pelvic lumbar arterial takeoff from the   false lumen. RAMIRO, SMA, celiac, and bilateral renal arteries originate   from the lumen. No evidence of and organ damage. No evidence of major   arterial vessel occlusion. constipation.  HEART: Borderline size. No pericardial effusion.  MEDIASTINUM AND MARANDA: No lymphadenopathy.  CHEST WALL AND LOWER NECK: Median sternotomysurgical changes without   complications.    ABDOMEN AND PELVIS:  LIVER: Steatosis.  BILE DUCTS: Normal caliber.  GALLBLADDER: Within normal limits.  SPLEEN: Within normal limits.  PANCREAS: Indeterminate hypodense lesions within the pancreatic tail,  with the largest measuring up to 2.5 cm in greatest dimension.  ADRENALS: Within normal limits.  KIDNEYS/URETERS: Indeterminate bilateral renal hypodensities are too   small to adequately characterize.    BLADDER: Within normal limits.  REPRODUCTIVE ORGANS: Hysterectomy.    BOWEL: Rectosigmoidectomy with left anterior pelvic wall colostomy.   Moderate-to-severe constipation. Some small bowel deep pelvic adhesions   are seen, without CT evident complications.  PERITONEUM/RETROPERITONEUM: Within normal limits.  VESSELS: Atherosclerotic changes. Read above.  LYMPH NODES: No lymphadenopathy.  ABDOMINAL WALL: Postsurgical changes.  BONES: Degenerative changes. Internal fixation surgical changes right   sacroiliac joint and pubic symphysis, without CT evident complications.   Diffuse osseous demineralization. Mild thoracolumbar scoliosis. Necrosis   of the bilateral femoral heads is highly suspected, right greater than   left, and may be further assessed and confirmed with outpatient   contrast-enhanced MRI bilateral hips. No acute osseous abnormality   detected. No suspicious bone lesions.    IMPRESSION:  Type A aortic dissection extending from the proximal aortic arch,   innominate artery level, to the infrarenal abdominal aorta segment, above   the bifurcation. There are no complications of vessel occlusion or and   organ damage. Read above text for additional findings and detailed   explanations.        --- End of Report ---      < end of copied text >      < from: Xray Chest 1 View-PORTABLE IMMEDIATE (01.08.25 @ 11:35) >  INTERPRETATION:  AP chest on January 8, 2025 at 11:27 AM. 2 images.   Patient is short of breath for 2 days.    COMPARISON: None available.    Heart enlargement. Sternotomy and TAVR aortic valve noted.    Slight increased interstitial markings may represent minimal CHF.    Mild right thoracic curve noted.    IMPRESSION: Heart enlargement, sternotomy, and TAVR aortic valve.   Possible slight interstitial CHF.    --- End of Report ---      < end of copied text >    < from: Xray Ankle Complete 3 Views, Right (01.03.25 @ 02:16) >  INTERPRETATION:  Clinical information: Right ankle/foot pain and swelling.    3 views each of the right ankle and right foot.    No acute fracture or dislocation is noted.    Soft tissue vascular calcification is present.    IMPRESSION:    Findings as discussed above.    --- End of Report ---        < end of copied text >        HEALTH ISSUES - PROBLEM Dx:  COPD exacerbation    Asthma    Epilepsy    HTN (hypertension)    T2DM (type 2 diabetes mellitus)    Chronic atrial fibrillation    Neuropathy    History of aortic aneurysm    Need for prophylactic measure    History of TIAs    Type 2 diabetes mellitus with hyperglycemia    Consultant(s) Notes Reviewed:  [ x ] YES     Care Discussed with [ x ] Consultants, [ x ] Patient, [ x ] Family- dtr, [  ] HCP, [ x ] , [  ] Social Service, [ x ] RN, [  ] Physical Therapy, [  ] Palliative Care Team  DVT PPX: [  x] Lovenox, [  ] SC Heparin, [  ] Coumadin, [  ] Xarelto, [  ] Eliquis, [  ] Pradaxa, [  ] IV Heparin drip, [  ] SCD, [  ] Ambulation, [  ] Contraindicated 2/2 GI Bleed, [  ] Contraindicated 2/2  Bleed, [  ] Contraindicated 2/2 Brain Bleed  Advanced Directive: [  ] None, [ x ] DNR/DNI

## 2025-01-09 NOTE — PROGRESS NOTE ADULT - NS ATTEND AMEND GEN_ALL_CORE FT
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  Evaluated by Dr. 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  Also has been coughing but denies fever, chills, sick contact, recent travel.  Denies palpitations or orthopnea.  Admits to SOB and KRAMER.  In the ED, her troponin was normal. EKG showed junctional rhythm, rate of 50's, with RBBB and LAD.     - now with COPD exacerbation  - ID and pulm recs  - Appears compensated from HF POV.     - No known Hx of CAD.  Likely, with some pleuritic component.  However, with known Hx of Type A and B dissection.    - s/p bioAVR and Bental procedure for Type A.  Though, Type B is medically managed  - CTA aorta showed Type A dissection extending from the proximal arch, innominate artery level, to the infrarenal abdominal aorta segment above the bifurcation.   - ED Attending discussed CTA result with CT Surgeon Nando and was determined to be stable.  No surgical intervention recommended  - Remains hemodynamically stable.  Recommend to resume all home BP meds    - Troponin negative.   - Initial EKG showed junctional rhythm with RBBB, LAD.  With known bifascicular block.  Overnight, NSR on tele  - resume Eliquis for PAF  - cont on mexiletine  - cont bp meds.   - cont statin

## 2025-01-09 NOTE — PROGRESS NOTE ADULT - ASSESSMENT
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  Evaluated by Dr. 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  Also has been coughing but denies fever, chills, sick contact, recent travel.  Denies palpitations or orthopnea.  Admits to SOB and KRAMER.  In the ED, her troponin was normal. EKG showed junctional rhythm, rate of 50's, with RBBB and LAD.     Chest Pain/Aortic Dissection/HTN  - No known Hx of CAD.  Likely, with some pleuritic component.  However, with known Hx of Type A and B dissection.    - s/p bioAVR and Bental procedure for Type A.  Though, Type B is medically managed  - CTA aorta showed Type A dissection extending from the proximal arch, innominate artery level, to the infrarenal abdominal aorta segment above the bifurcation.   - ED Attending discussed CTA result with CT Surgeon Nando and was determined to be stable.  No surgical intervention recommended  - Remains hemodynamically stable.  Recommend to resume all home BP meds    - Troponin negative.   - Initial EKG showed junctional rhythm with RBBB, LAD.  With known bifascicular block.  Overnight, NSR on tele  - Would likely resume Eliquis     - BP stable but starting to uptrend  - Will keep SBP <110    - No evidence of volume overload  - Pro-BNP normal  - CT chest with small pleural effusions but no definite volume overload on exam  - Recommend Pulm eval.  Likely, COPD exac as well    - Monitor electrolytes, replete to keep K>4 and Mag>2  - Will follow closely     Kristine Wadsworth DNP, NP-C, AGACNP-C  Cardiology  Call TEAMS

## 2025-01-09 NOTE — DISCHARGE NOTE PROVIDER - NSDCACTIVITY_GEN_ALL_CORE
No heavy lifting/straining/Activity as tolerated Do not drive or operate machinery/Walking - Indoors allowed/No heavy lifting/straining/Activity as tolerated

## 2025-01-09 NOTE — PROGRESS NOTE ADULT - SUBJECTIVE AND OBJECTIVE BOX
Date/Time Patient Seen:  		  Referring MD:   Data Reviewed	       Patient is a 74y old  Female who presents with a chief complaint of COPD Exacerbation (08 Jan 2025 17:21)      Subjective/HPI     PAST MEDICAL & SURGICAL HISTORY:  History of COPD    Afib    Aortic valve replaced    Epilepsy    No significant past surgical history          Medication list         MEDICATIONS  (STANDING):  clopidogrel Tablet 75 milliGRAM(s) Oral Once  dextrose 5%. 1000 milliLiter(s) (50 mL/Hr) IV Continuous <Continuous>  dextrose 5%. 1000 milliLiter(s) (100 mL/Hr) IV Continuous <Continuous>  dextrose 50% Injectable 25 Gram(s) IV Push once  dextrose 50% Injectable 12.5 Gram(s) IV Push once  dextrose 50% Injectable 25 Gram(s) IV Push once  fluticasone propionate/ salmeterol 250-50 MICROgram(s) Diskus 1 Dose(s) Inhalation two times a day  gabapentin 200 milliGRAM(s) Oral three times a day  glucagon  Injectable 1 milliGRAM(s) IntraMuscular once  influenza  Vaccine (HIGH DOSE) 0.5 milliLiter(s) IntraMuscular once  insulin glargine Injectable (LANTUS) 25 Unit(s) SubCutaneous at bedtime  insulin lispro (ADMELOG) corrective regimen sliding scale   SubCutaneous three times a day before meals  levETIRAcetam 1000 milliGRAM(s) Oral two times a day  megestrol 20 milliGRAM(s) Oral daily  predniSONE   Tablet 20 milliGRAM(s) Oral daily  rosuvastatin 5 milliGRAM(s) Oral at bedtime  sertraline 50 milliGRAM(s) Oral daily    MEDICATIONS  (PRN):  albuterol    90 MICROgram(s) HFA Inhaler 2 Puff(s) Inhalation every 6 hours PRN Shortness of Breath and/or Wheezing  dextrose Oral Gel 15 Gram(s) Oral once PRN Blood Glucose LESS THAN 70 milliGRAM(s)/deciliter         Vitals log        ICU Vital Signs Last 24 Hrs  T(C): 36.5 (09 Jan 2025 05:05), Max: 36.7 (08 Jan 2025 10:54)  T(F): 97.7 (09 Jan 2025 05:05), Max: 98.1 (08 Jan 2025 10:54)  HR: 69 (09 Jan 2025 05:05) (50 - 69)  BP: 134/66 (09 Jan 2025 05:05) (105/55 - 144/64)  BP(mean): --  ABP: --  ABP(mean): --  RR: 17 (09 Jan 2025 05:05) (16 - 18)  SpO2: 98% (09 Jan 2025 05:05) (98% - 99%)    O2 Parameters below as of 09 Jan 2025 05:05  Patient On (Oxygen Delivery Method): room air                 Input and Output:  I&O's Detail      Lab Data                        11.7   10.25 )-----------( 255      ( 08 Jan 2025 11:50 )             37.6     01-08    141  |  108  |  16  ----------------------------<  188[H]  5.1   |  27  |  0.83    Ca    10.1      08 Jan 2025 11:50    TPro  7.1  /  Alb  3.5  /  TBili  0.2  /  DBili  x   /  AST  26  /  ALT  21  /  AlkPhos  79  01-08            Review of Systems	      Objective     Physical Examination    heart s1s2  lung dc bS  head nc  head at      Pertinent Lab findings & Imaging      Seven:  NO   Adequate UO     I&O's Detail           Discussed with:     Cultures:	        Radiology

## 2025-01-09 NOTE — PROGRESS NOTE ADULT - PROBLEM SELECTOR PLAN 4
Chronic on inhalers and prednisone  - Cont home inhalers and steroids  - Start solumedrol 20mg IV Q8h  - Breo interchange will start Advair  -Cont Duoneb prn wheezing Chronic on inhalers and prednisone at Home  - Cont home inhalers and steroids  - On Prednisone daily 20 mg   - Breo interchange will start Advair  -Cont Duoneb prn wheezing  Pulmonary-Dr Luis d/w at detail

## 2025-01-09 NOTE — PROGRESS NOTE ADULT - ASSESSMENT
74yFemale pmhx of Epilepsy on Keppra, COPD, asthma on chronic steroids, bronchiectasis, tracheomalacia s/p treacheopalsty, HTN, Hx of dissection of descending thoracic aorta s/p repair, hx of aortic aneurysm, afib on eliquis, DM, Colon cancer, neuropathy, presents to the ED for shortness of breath    aortic dissection  tree in bud  atelectasis  copd  seizure disorder  OP  OA  Bronchiectasis  tracheomalacia  AFIB  DM  hx of Neuropathy  hx of Colon Ca    rpt Lactic acid  vs noted  meds reviewed  cardio f/u    cardio eval - ct angio reviewed - type A Aortic Dissection -   old records reviewed -   follows with DR Allison Pulmonary Medicine - [facility] practice -   ct chest imaging - tree in bud - bronchiectasis - Mild atelectatic changes at both lung bases. Mild diffuse bronchial wall thickening,  Advair - Proventil PRN - low dose Prednisone  cough rx regimen  I nadiya  VBG  Cvs rx regimen  BP control  on DOAC for AF  rate and rhythm control  outpatient records reviewed  monitor VS and HD and Sat  goal sat > 88 pct  nutritional assessment  GOC discussion

## 2025-01-09 NOTE — ED ADULT NURSE REASSESSMENT NOTE - NS ED NURSE REASSESS COMMENT FT1
Spoke to admitting resident and requested to discontinue RVP for patient since REVP was resulted already at 1pm.

## 2025-01-09 NOTE — CARE COORDINATION ASSESSMENT. - NSDCPLANSERVICES_GEN_ALL_CORE
Met with pt at bedside today. Pt is alert and oriented x 4.  Pt resides with her daughter Zoe Flores who is HCP [phone]. Pt has Pca through [place] [employer] waiver program 12 hr x 7 days. Pts daughter is the [employer] aide and works 40 hrs a week. Pt requires 517 form to be completed and faxed to [facility] [phone] Peg Lakhani to resume services pta. Pt has no stairs DME walker, Shower chair and grab bars. Pt ambulates witha rolling walker and requires assistance with adls pta.  Pt also [employer] [facility] pt and receives VNS and physical therapy from [facility]. Daughter requests all services to be resumed on discharge. 517 to be placed in chart for MD to complete/Home Care

## 2025-01-09 NOTE — PROGRESS NOTE ADULT - PROBLEM SELECTOR PLAN 2
-On ELIQUIS,  - Resume Eliquis   - Resume BP medications  - Resume mexiletine 200mg tid  HR -Bradycardia -On ELIQUIS, d/w Cardio NP-Resume Home meds  - Resume Eliquis 2x day  - Resume BP medications  - Resume mexiletine 200mg tid  HR -stable

## 2025-01-09 NOTE — DISCHARGE NOTE PROVIDER - CARE PROVIDER_API CALL
[place]  Internal Medicine  [address]  [place] [address]  Phone: [phone]  Fax: [phone]  Follow Up Time:     Eulalio Allison  Pulmonary Disease  [address]  [place] [address]  Phone: [phone]  Fax: [phone]  Follow Up Time:

## 2025-01-09 NOTE — CONSULT NOTE ADULT - ASSESSMENT
74yFemale pmhx of Epilepsy on Keppra, COPD, asthma on chronic steroids, bronchiectasis, tracheomalacia s/p treacheopalsty, HTN, Hx of dissection of descending thoracic aorta s/p repair, hx of aortic aneurysm, afib on eliquis, DM, Colon cancer, neuropathy, presents to the ED for shortness of breath    aortic dissection  tree in bud  atelectasis  copd  seizure disorder  OP  OA  Bronchiectasis  tracheomalacia  AFIB  DM  hx of Neuropathy  hx of Colon Ca    cardio eval - ct angio reviewed - type A Aortic Dissection -   old records reviewed -   follows with DR Allison Pulmonary Medicine - [facility] practice -   ct chest imaging - tree in bud - bronchiectasis - Mild atelectatic changes at both lung bases. Mild diffuse bronchial wall thickening,  Advair - Proventil PRN - low dose Prednisone  cough rx regimen  I nadiya  VBG  Cvs rx regimen  BP control  on DOAC for AF  rate and rhythm control  outpatient records reviewed  monitor VS and HD and Sat  goal sat > 88 pct  nutritional assessment  GOC discussion    
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  Evaluated by Dr. 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  Also has been coughing but denies fever, chills, sick contact, recent travel.  Denies palpitations or orthopnea.  Admits to SOB and KRAMER.  In the ED, her troponin was normal. EKG showed junctional rhythm, rate of 50's, with RBBB and LAD.     Chest Pain/Aortic Dissection/HTN  - No known Hx of CAD.  Likely, with some pleuritic component.  However, with known Hx of Type A and B dissection.    - s/p bioAVR and Bental procedure for Type A.  Though, Type B is medically managed  - CTA aorta showed Type A dissection extending fro the proximal arch, innominate artery level, to the infrarenal abdominal aorta segment above the bifurcation.   - CT Surgery input pending    - Troponin negative.  Will repeat  - EKG showed junctional rhythm with RBBB, LAD.  With known bifascicular block  - Would hold Eliquis for now  - Would hold any AVN blocker    - BP stable but initially soft at systolic 100's  - Hold BP meds pending CT surgery eval    - No evidence of voluem overload  - Pro-BNP normal  - CT chest with small pleural effusions but no definite volume overload on exam  - Recommend Pulm eval.  Likely, COPD exac as well    - At risk of decompensation  - Monitor electrolytes, replete to keep K>4 and Mag>2  - Will follow closely but will need higher level of care if not transferred.  Recommend ICU eval    Kristine Wadsworth DNP, NP-C, AGACNP-C  Cardiology  Call TEAMS      
Physical Exam:   Vital Signs Last 24 Hrs  T(C): 36.5 (09 Jan 2025 05:05), Max: 36.6 (08 Jan 2025 21:31)  T(F): 97.7 (09 Jan 2025 05:05), Max: 97.9 (08 Jan 2025 21:31)  HR: 69 (09 Jan 2025 10:00) (50 - 69)  BP: 152/77 (09 Jan 2025 10:00) (125/58 - 152/77)  BP(mean): --  RR: 17 (09 Jan 2025 10:00) (16 - 18)  SpO2: 98% (09 Jan 2025 10:00) (98% - 98%)    Parameters below as of 09 Jan 2025 10:00  Patient On (Oxygen Delivery Method): room air    CAPILLARY BLOOD GLUCOSE      POCT Blood Glucose.: 254 mg/dL (09 Jan 2025 08:09)  POCT Blood Glucose.: 189 mg/dL (08 Jan 2025 16:27)        DIET: CC  >50%

## 2025-01-09 NOTE — PHYSICAL THERAPY INITIAL EVALUATION ADULT - PLANNED THERAPY INTERVENTIONS, PT EVAL
balance training/gait training balance training/bed mobility training/gait training/strengthening/transfer training

## 2025-01-09 NOTE — CONSULT NOTE ADULT - SUBJECTIVE AND OBJECTIVE BOX
[facility]  INFECTIOUS DISEASES   [address] [place] [address]  Tel: [phone]     Fax: [phone]  ========================================================  MD Daquan Moore Michelle, MD Shah, Kaushal, MD Sunjit, Jaspal, MD Sehrish Shahid, MD   ========================================================    [id number]  RAYRAY RODRIGUEZ     CC: Patient is a 74y old  Female who presents with a chief complaint of COPD Exacerbation (09 Jan 2025 10:42)    HPI:  74yFemale pmhx of Epilepsy on Keppra, COPD, asthma on chronic steroids, bronchiectasis, tracheomalacia s/p tracheloplasty HTN, Hx of dissection of descending thoracic aorta, , hx of aortic aneurysm,  Type B 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  Evaluated by Dr. Antonio,  Type A dissection s/p bioAVR with Bental procedure (9/2022), severe AS s/p TAVR (9/10/2023), TIA's, DVT Afib on Eliquis, T2DM, Colon cancer, neuropathy, presents to the ED for shortness of breath. Patient reports for the past 1-2 days having increased chest pressure out of her baseline, states she has also had cough, wheezing and shortness of breath with minimal relief after using her normal regimen of inhalers and nebulizers. Patient denies any fevers since onset of symptoms, however states her legs have been on/off swelling for many years. Patient of note has had multiple episodes and hospital visits for PNA over the past 2-3 months recent discharge on 12/12/24 at [facility] treated with ertapanem premedicated with benadryl. Pt otherwise denies any other concerns or complaints at this time, no fever, headache, cp, n/v/d.    ED course  Vitals: T 98.1F, HR 54, /64, RR 18, SpO2 99% on 2L NC  Significant labs: WBC 10.25, Hgb 11.7, Hct 37.6  Na 141, K 5.1, Cr .83  Lactate 2.5  UA Trace leuk esterase    PAST MEDICAL & SURGICAL HISTORY:  History of COPD  Afib  Aortic valve replaced  Epilepsy  No significant past surgical history    Social Hx: No current smoking, EtOH or drugs     FAMILY HISTORY:  Noncontributory     Allergies  aspirin (Unknown)  ampicillin (Unknown)  Valium (Unknown)  codeine (Unknown)  cefepime (Short breath (Severe))  Percocet (Unknown)  penicillin (Unknown)  Avelox (Unknown)    MEDICATIONS  (STANDING):  dextrose 5%. 1000 milliLiter(s) (50 mL/Hr) IV Continuous <Continuous>  dextrose 5%. 1000 milliLiter(s) (100 mL/Hr) IV Continuous <Continuous>  dextrose 50% Injectable 25 Gram(s) IV Push once  dextrose 50% Injectable 12.5 Gram(s) IV Push once  dextrose 50% Injectable 25 Gram(s) IV Push once  fluticasone propionate/ salmeterol 250-50 MICROgram(s) Diskus 1 Dose(s) Inhalation two times a day  gabapentin 200 milliGRAM(s) Oral three times a day  glucagon  Injectable 1 milliGRAM(s) IntraMuscular once  influenza  Vaccine (HIGH DOSE) 0.5 milliLiter(s) IntraMuscular once  insulin glargine Injectable (LANTUS) 25 Unit(s) SubCutaneous at bedtime  insulin lispro (ADMELOG) corrective regimen sliding scale   SubCutaneous three times a day before meals  insulin lispro Injectable (ADMELOG) 2 Unit(s) SubCutaneous three times a day before meals  labetalol 100 milliGRAM(s) Oral every 8 hours  levETIRAcetam 1000 milliGRAM(s) Oral two times a day  megestrol 20 milliGRAM(s) Oral daily  mexiletine 200 milliGRAM(s) Oral three times a day  mineral oil 30 milliLiter(s) Oral two times a day  NIFEdipine XL 30 milliGRAM(s) Oral daily  polyethylene glycol 3350 17 Gram(s) Oral two times a day  predniSONE   Tablet 20 milliGRAM(s) Oral daily  rosuvastatin 5 milliGRAM(s) Oral at bedtime  sertraline 50 milliGRAM(s) Oral daily     REVIEW OF SYSTEMS:  CONSTITUTIONAL:  No Fever or chills  HEENT:  No diplopia or blurred vision.  No sore throat or runny nose.  CARDIOVASCULAR:  No chest pain   RESPIRATORY:  No cough, shortness of breath, PND or orthopnea.  GASTROINTESTINAL:  No nausea, vomiting or diarrhea.  GENITOURINARY:  No dysuria, frequency or urgency. No Blood in urine  MUSCULOSKELETAL:  no joint aches, no muscle pain  SKIN:  No change in skin, hair or nails.    Physical Exam:  Vital Signs Last 24 Hrs  T(C): 36.6 (09 Jan 2025 11:57), Max: 36.6 (08 Jan 2025 21:31)  T(F): 97.9 (09 Jan 2025 11:57), Max: 97.9 (08 Jan 2025 21:31)  HR: 68 (09 Jan 2025 11:57) (50 - 69)  BP: 128/70 (09 Jan 2025 11:57) (125/58 - 152/77)  BP(mean): --  RR: 20 (09 Jan 2025 11:57) (16 - 20)  SpO2: 93% (09 Jan 2025 11:57) (93% - 98%)  Parameters below as of 09 Jan 2025 11:57  Patient On (Oxygen Delivery Method): room air  Height (cm): 170.2 (01-09 @ 10:42)  Weight (kg): 70.261 (01-09 @ 10:42)  BMI (kg/m2): 24.3 (01-09 @ 10:42)  BSA (m2): 1.81 (01-09 @ 10:42)  GEN: NAD  HEENT: normocephalic and atraumatic. EOMI. PERRL.    NECK: Supple.  No lymphadenopathy   LUNGS: Clear to auscultation.  HEART: Regular rate and rhythm without murmur.  ABDOMEN: Soft, nontender, and nondistended.  Positive bowel sounds.    EXTREMITIES: Without edema.  NEUROLOGIC: grossly intact.  PSYCHIATRIC: Appropriate affect .  SKIN: No rash     Labs:  01-09    140  |  108  |  19  ----------------------------<  280[H]  4.3   |  26  |  0.80    Ca    9.2      09 Jan 2025 05:16    TPro  6.5  /  Alb  3.2[L]  /  TBili  0.3  /  DBili  x   /  AST  9[L]  /  ALT  18  /  AlkPhos  78  01-09                        11.3   11.72 )-----------( 255      ( 09 Jan 2025 05:16 )             35.6     PT/INR - ( 08 Jan 2025 11:50 )   PT: 16.2 sec;   INR: 1.38 ratio    PTT - ( 08 Jan 2025 11:50 )  PTT:39.6 sec  Urinalysis Basic - ( 09 Jan 2025 05:16 )    Color: x / Appearance: x / SG: x / pH: x  Gluc: 280 mg/dL / Ketone: x  / Bili: x / Urobili: x   Blood: x / Protein: x / Nitrite: x   Leuk Esterase: x / RBC: x / WBC x   Sq Epi: x / Non Sq Epi: x / Bacteria: x    LIVER FUNCTIONS - ( 09 Jan 2025 05:16 )  Alb: 3.2 g/dL / Pro: 6.5 g/dL / ALK PHOS: 78 U/L / ALT: 18 U/L / AST: 9 U/L / GGT: x           SARS-CoV-2 Result: NotDetec (01-08-25 @ 11:50)  SARS-CoV-2: NotDetec (01-08-25 @ 11:50)    RECENT CULTURES:  01-08 @ 11:50      NotDetec    All imaging and other data have been reviewed.  < from: CT Angio Abdomen and Pelvis w/ IV Cont (01.08.25 @ 14:50) >  IMPRESSION:  Type A aortic dissection extending from the proximal aortic arch,   innominate artery level, to the infrarenal abdominal aorta segment, above   the bifurcation. There are no complications of vessel occlusion or and   organ damage. Read above text for additional findings and detailed   explanations.    < from: CT Chest No Cont (01.08.25 @ 13:00) >  IMPRESSION:  Limited by motion and absence of IV contrast.  Displaced intimal calcifications in the mid to distal descending thoracic   aorta(602:65) consistent with dissection.  Right upper lobe groundglass opacities versus motion artifact. No focal   consolidation. Trace bilateral pleural effusions.  1.4 cm pleural-based right lower lobe nodule. Recommend comparison with   any priorstudies. If none are available, consider PET/CT.  Cardiomegaly. Aortic valve repair.    Assessment and Plan:   73yo woman with PMH of HTN, DM2, afib, COPD/asthma, bronchiectasis, tracheomalacia s/p tracheloplasty, Epilepsy, and Hx of dissection/aneurysm of descending thoracic aorta, s/p AVR and colon cancer was admitted with SOB.   Chest CT showed some GGOs and one nodule, no consolidation.     Thank you for courtesy of this consult.     Will follow.  Discussed with the primary team.     Stanton Peng MD  Division of Infectious Diseases   Please call ID service at [phone] with any question.    75 minutes spent on total encounter assessing patient, examination, chart review, counseling and coordinating care by the attending physician/nurse/care manager.   [facility]  INFECTIOUS DISEASES   [address] [place] [address]  Tel: [phone]     Fax: [phone]  ========================================================  MD Daquan Moore Michelle, MD Shah, Kaushal, MD Sunjit, Jaspal, MD Sehrish Shahid, MD   ========================================================    [id number]  RAYRAY RODRIGUEZ     CC: Patient is a 74y old  Female who presents with a chief complaint of COPD Exacerbation (09 Jan 2025 10:42)    HPI:  74yFemale pmhx of Epilepsy on Keppra, COPD, asthma on chronic steroids, bronchiectasis, tracheomalacia s/p tracheloplasty HTN, Hx of dissection of descending thoracic aorta, , hx of aortic aneurysm,  Type B 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  Evaluated by Dr. Antonio,  Type A dissection s/p bioAVR with Bental procedure (9/2022), severe AS s/p TAVR (9/10/2023), TIA's, DVT Afib on Eliquis, T2DM, Colon cancer, neuropathy, presents to the ED for shortness of breath. Patient reports for the past 1-2 days having increased chest pressure out of her baseline, states she has also had cough, wheezing and shortness of breath with minimal relief after using her normal regimen of inhalers and nebulizers. Patient denies any fevers since onset of symptoms, however states her legs have been on/off swelling for many years. Patient of note has had multiple episodes and hospital visits for PNA over the past 2-3 months recent discharge on 12/12/24 at [facility] treated with ertapanem premedicated with benadryl. Pt otherwise denies any other concerns or complaints at this time, no fever, headache, cp, n/v/d.    ED course  Vitals: T 98.1F, HR 54, /64, RR 18, SpO2 99% on 2L NC  Significant labs: WBC 10.25, Hgb 11.7, Hct 37.6  Na 141, K 5.1, Cr .83  Lactate 2.5  UA Trace leuk esterase    PAST MEDICAL & SURGICAL HISTORY:  History of COPD  Afib  Aortic valve replaced  Epilepsy  No significant past surgical history    Social Hx: No current smoking, EtOH or drugs     FAMILY HISTORY:  Noncontributory     Allergies  aspirin (Unknown)  ampicillin (Unknown)  Valium (Unknown)  codeine (Unknown)  cefepime (Short breath (Severe))  Percocet (Unknown)  penicillin (Unknown)  Avelox (Unknown)    MEDICATIONS  (STANDING):  dextrose 5%. 1000 milliLiter(s) (50 mL/Hr) IV Continuous <Continuous>  dextrose 5%. 1000 milliLiter(s) (100 mL/Hr) IV Continuous <Continuous>  dextrose 50% Injectable 25 Gram(s) IV Push once  dextrose 50% Injectable 12.5 Gram(s) IV Push once  dextrose 50% Injectable 25 Gram(s) IV Push once  fluticasone propionate/ salmeterol 250-50 MICROgram(s) Diskus 1 Dose(s) Inhalation two times a day  gabapentin 200 milliGRAM(s) Oral three times a day  glucagon  Injectable 1 milliGRAM(s) IntraMuscular once  influenza  Vaccine (HIGH DOSE) 0.5 milliLiter(s) IntraMuscular once  insulin glargine Injectable (LANTUS) 25 Unit(s) SubCutaneous at bedtime  insulin lispro (ADMELOG) corrective regimen sliding scale   SubCutaneous three times a day before meals  insulin lispro Injectable (ADMELOG) 2 Unit(s) SubCutaneous three times a day before meals  labetalol 100 milliGRAM(s) Oral every 8 hours  levETIRAcetam 1000 milliGRAM(s) Oral two times a day  megestrol 20 milliGRAM(s) Oral daily  mexiletine 200 milliGRAM(s) Oral three times a day  mineral oil 30 milliLiter(s) Oral two times a day  NIFEdipine XL 30 milliGRAM(s) Oral daily  polyethylene glycol 3350 17 Gram(s) Oral two times a day  predniSONE   Tablet 20 milliGRAM(s) Oral daily  rosuvastatin 5 milliGRAM(s) Oral at bedtime  sertraline 50 milliGRAM(s) Oral daily     REVIEW OF SYSTEMS:  CONSTITUTIONAL:  No Fever or chills  HEENT:  No diplopia or blurred vision.  No sore throat or runny nose.  CARDIOVASCULAR:  No chest pain   RESPIRATORY:  No cough, shortness of breath, PND or orthopnea.  GASTROINTESTINAL:  No nausea, vomiting or diarrhea.  GENITOURINARY:  No dysuria, frequency or urgency. No Blood in urine  MUSCULOSKELETAL:  no joint aches, no muscle pain  SKIN:  No change in skin, hair or nails.    Physical Exam:  Vital Signs Last 24 Hrs  T(C): 36.6 (09 Jan 2025 11:57), Max: 36.6 (08 Jan 2025 21:31)  T(F): 97.9 (09 Jan 2025 11:57), Max: 97.9 (08 Jan 2025 21:31)  HR: 68 (09 Jan 2025 11:57) (50 - 69)  BP: 128/70 (09 Jan 2025 11:57) (125/58 - 152/77)  BP(mean): --  RR: 20 (09 Jan 2025 11:57) (16 - 20)  SpO2: 93% (09 Jan 2025 11:57) (93% - 98%)  Parameters below as of 09 Jan 2025 11:57  Patient On (Oxygen Delivery Method): room air  Height (cm): 170.2 (01-09 @ 10:42)  Weight (kg): 70.261 (01-09 @ 10:42)  BMI (kg/m2): 24.3 (01-09 @ 10:42)  BSA (m2): 1.81 (01-09 @ 10:42)  GEN: NAD  HEENT: normocephalic and atraumatic.   NECK: Supple.  No lymphadenopathy   LUNGS: scattered rhonchi   HEART: Regular rate and rhythm   ABDOMEN: Soft, nontender, and nondistended.  EXTREMITIES: Without edema.  NEUROLOGIC: grossly intact.  PSYCHIATRIC: Appropriate affect .  SKIN: No rash     Labs:  01-09    140  |  108  |  19  ----------------------------<  280[H]  4.3   |  26  |  0.80    Ca    9.2      09 Jan 2025 05:16    TPro  6.5  /  Alb  3.2[L]  /  TBili  0.3  /  DBili  x   /  AST  9[L]  /  ALT  18  /  AlkPhos  78  01-09                        11.3   11.72 )-----------( 255      ( 09 Jan 2025 05:16 )             35.6     PT/INR - ( 08 Jan 2025 11:50 )   PT: 16.2 sec;   INR: 1.38 ratio    PTT - ( 08 Jan 2025 11:50 )  PTT:39.6 sec  Urinalysis Basic - ( 09 Jan 2025 05:16 )    Color: x / Appearance: x / SG: x / pH: x  Gluc: 280 mg/dL / Ketone: x  / Bili: x / Urobili: x   Blood: x / Protein: x / Nitrite: x   Leuk Esterase: x / RBC: x / WBC x   Sq Epi: x / Non Sq Epi: x / Bacteria: x    LIVER FUNCTIONS - ( 09 Jan 2025 05:16 )  Alb: 3.2 g/dL / Pro: 6.5 g/dL / ALK PHOS: 78 U/L / ALT: 18 U/L / AST: 9 U/L / GGT: x           SARS-CoV-2 Result: NotDetec (01-08-25 @ 11:50)  SARS-CoV-2: NotDetec (01-08-25 @ 11:50)    RECENT CULTURES:  01-08 @ 11:50      NotDetec    All imaging and other data have been reviewed.  < from: CT Angio Abdomen and Pelvis w/ IV Cont (01.08.25 @ 14:50) >  IMPRESSION:  Type A aortic dissection extending from the proximal aortic arch,   innominate artery level, to the infrarenal abdominal aorta segment, above   the bifurcation. There are no complications of vessel occlusion or and   organ damage. Read above text for additional findings and detailed   explanations.    < from: CT Chest No Cont (01.08.25 @ 13:00) >  IMPRESSION:  Limited by motion and absence of IV contrast.  Displaced intimal calcifications in the mid to distal descending thoracic   aorta(602:65) consistent with dissection.  Right upper lobe groundglass opacities versus motion artifact. No focal   consolidation. Trace bilateral pleural effusions.  1.4 cm pleural-based right lower lobe nodule. Recommend comparison with   any priorstudies. If none are available, consider PET/CT.  Cardiomegaly. Aortic valve repair.    Assessment and Plan:   75yo woman with PMH of HTN, DM2, afib, COPD/asthma, bronchiectasis, tracheomalacia s/p tracheloplasty, Epilepsy, and Hx of dissection/aneurysm of descending thoracic aorta, s/p AVR and colon cancer was admitted with SOB.   Chest CT showed some GGOs and one nodule, no consolidation.     This is a COPD exacerbation patient, they benefit from treatment with azithromycin, there are some questionable GGO and one nodule in lung, less likely pneumonia.     # COPD exacerbation  # R/O Pneumonia     - Can follow CBC, Tmax, Creat and cultures   - Start azithromycin 500mg one dose nd then 4 days of 250mg   - Pulmonary follow up   - Possibly needs bronch after discharge   - Will send QuantiFeron      Thank you for courtesy of this consult.     Will follow.  Discussed with the primary team.     Stanton Peng MD  Division of Infectious Diseases   Please call ID service at [phone] with any question.    75 minutes spent on total encounter assessing patient, examination, chart review, counseling and coordinating care by the attending physician/nurse/care manager.   [facility]  INFECTIOUS DISEASES   [address] [place] [address]  Tel: [phone]     Fax: [phone]  ========================================================  MD Daquan Moore Michelle, MD Shah, Kaushal, MD Sunjit, Jaspal, MD Sehrish Shahid, MD   ========================================================    [id number]  RAYRAY RODRIGUEZ     CC: Patient is a 74y old  Female who presents with a chief complaint of COPD Exacerbation (09 Jan 2025 10:42)    HPI:  74yFemale pmhx of Epilepsy on Keppra, COPD, asthma on chronic steroids, bronchiectasis, tracheomalacia s/p tracheloplasty HTN, Hx of dissection of descending thoracic aorta, , hx of aortic aneurysm,  Type B 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  Evaluated by Dr. Antonio,  Type A dissection s/p bioAVR with Bental procedure (9/2022), severe AS s/p TAVR (9/10/2023), TIA's, DVT Afib on Eliquis, T2DM, Colon cancer, neuropathy, presents to the ED for shortness of breath. Patient reports for the past 1-2 days having increased chest pressure out of her baseline, states she has also had cough, wheezing and shortness of breath with minimal relief after using her normal regimen of inhalers and nebulizers. Patient denies any fevers since onset of symptoms, however states her legs have been on/off swelling for many years. Patient of note has had multiple episodes and hospital visits for PNA over the past 2-3 months recent discharge on 12/12/24 at [facility] treated with ertapanem premedicated with benadryl. Pt otherwise denies any other concerns or complaints at this time, no fever, headache, cp, n/v/d.    ED course  Vitals: T 98.1F, HR 54, /64, RR 18, SpO2 99% on 2L NC  Significant labs: WBC 10.25, Hgb 11.7, Hct 37.6  Na 141, K 5.1, Cr .83  Lactate 2.5  UA Trace leuk esterase    PAST MEDICAL & SURGICAL HISTORY:  History of COPD  Afib  Aortic valve replaced  Epilepsy  No significant past surgical history    Social Hx: No current smoking, EtOH or drugs     FAMILY HISTORY:  Noncontributory     Allergies  aspirin (Unknown)  ampicillin (Unknown)  Valium (Unknown)  codeine (Unknown)  cefepime (Short breath (Severe))  Percocet (Unknown)  penicillin (Unknown)  Avelox (Unknown)    MEDICATIONS  (STANDING):  dextrose 5%. 1000 milliLiter(s) (50 mL/Hr) IV Continuous <Continuous>  dextrose 5%. 1000 milliLiter(s) (100 mL/Hr) IV Continuous <Continuous>  dextrose 50% Injectable 25 Gram(s) IV Push once  dextrose 50% Injectable 12.5 Gram(s) IV Push once  dextrose 50% Injectable 25 Gram(s) IV Push once  fluticasone propionate/ salmeterol 250-50 MICROgram(s) Diskus 1 Dose(s) Inhalation two times a day  gabapentin 200 milliGRAM(s) Oral three times a day  glucagon  Injectable 1 milliGRAM(s) IntraMuscular once  influenza  Vaccine (HIGH DOSE) 0.5 milliLiter(s) IntraMuscular once  insulin glargine Injectable (LANTUS) 25 Unit(s) SubCutaneous at bedtime  insulin lispro (ADMELOG) corrective regimen sliding scale   SubCutaneous three times a day before meals  insulin lispro Injectable (ADMELOG) 2 Unit(s) SubCutaneous three times a day before meals  labetalol 100 milliGRAM(s) Oral every 8 hours  levETIRAcetam 1000 milliGRAM(s) Oral two times a day  megestrol 20 milliGRAM(s) Oral daily  mexiletine 200 milliGRAM(s) Oral three times a day  mineral oil 30 milliLiter(s) Oral two times a day  NIFEdipine XL 30 milliGRAM(s) Oral daily  polyethylene glycol 3350 17 Gram(s) Oral two times a day  predniSONE   Tablet 20 milliGRAM(s) Oral daily  rosuvastatin 5 milliGRAM(s) Oral at bedtime  sertraline 50 milliGRAM(s) Oral daily     REVIEW OF SYSTEMS:  CONSTITUTIONAL:  No Fever or chills  HEENT:  No diplopia or blurred vision.  No sore throat or runny nose.  CARDIOVASCULAR:  No chest pain   RESPIRATORY:  No cough, shortness of breath, PND or orthopnea.  GASTROINTESTINAL:  No nausea, vomiting or diarrhea.  GENITOURINARY:  No dysuria, frequency or urgency. No Blood in urine  MUSCULOSKELETAL:  no joint aches, no muscle pain  SKIN:  No change in skin, hair or nails.    Physical Exam:  Vital Signs Last 24 Hrs  T(C): 36.6 (09 Jan 2025 11:57), Max: 36.6 (08 Jan 2025 21:31)  T(F): 97.9 (09 Jan 2025 11:57), Max: 97.9 (08 Jan 2025 21:31)  HR: 68 (09 Jan 2025 11:57) (50 - 69)  BP: 128/70 (09 Jan 2025 11:57) (125/58 - 152/77)  BP(mean): --  RR: 20 (09 Jan 2025 11:57) (16 - 20)  SpO2: 93% (09 Jan 2025 11:57) (93% - 98%)  Parameters below as of 09 Jan 2025 11:57  Patient On (Oxygen Delivery Method): room air  Height (cm): 170.2 (01-09 @ 10:42)  Weight (kg): 70.261 (01-09 @ 10:42)  BMI (kg/m2): 24.3 (01-09 @ 10:42)  BSA (m2): 1.81 (01-09 @ 10:42)  GEN: NAD  HEENT: normocephalic and atraumatic.   NECK: Supple.  No lymphadenopathy   LUNGS: scattered rhonchi   HEART: Regular rate and rhythm   ABDOMEN: Soft, nontender, and nondistended.  EXTREMITIES: Without edema.  NEUROLOGIC: grossly intact.  PSYCHIATRIC: Appropriate affect .  SKIN: No rash     Labs:  01-09    140  |  108  |  19  ----------------------------<  280[H]  4.3   |  26  |  0.80    Ca    9.2      09 Jan 2025 05:16    TPro  6.5  /  Alb  3.2[L]  /  TBili  0.3  /  DBili  x   /  AST  9[L]  /  ALT  18  /  AlkPhos  78  01-09                        11.3   11.72 )-----------( 255      ( 09 Jan 2025 05:16 )             35.6     PT/INR - ( 08 Jan 2025 11:50 )   PT: 16.2 sec;   INR: 1.38 ratio    PTT - ( 08 Jan 2025 11:50 )  PTT:39.6 sec  Urinalysis Basic - ( 09 Jan 2025 05:16 )    Color: x / Appearance: x / SG: x / pH: x  Gluc: 280 mg/dL / Ketone: x  / Bili: x / Urobili: x   Blood: x / Protein: x / Nitrite: x   Leuk Esterase: x / RBC: x / WBC x   Sq Epi: x / Non Sq Epi: x / Bacteria: x    LIVER FUNCTIONS - ( 09 Jan 2025 05:16 )  Alb: 3.2 g/dL / Pro: 6.5 g/dL / ALK PHOS: 78 U/L / ALT: 18 U/L / AST: 9 U/L / GGT: x           SARS-CoV-2 Result: NotDetec (01-08-25 @ 11:50)  SARS-CoV-2: NotDetec (01-08-25 @ 11:50)    RECENT CULTURES:  01-08 @ 11:50      NotDetec    All imaging and other data have been reviewed.  < from: CT Angio Abdomen and Pelvis w/ IV Cont (01.08.25 @ 14:50) >  IMPRESSION:  Type A aortic dissection extending from the proximal aortic arch,   innominate artery level, to the infrarenal abdominal aorta segment, above   the bifurcation. There are no complications of vessel occlusion or and   organ damage. Read above text for additional findings and detailed   explanations.    < from: CT Chest No Cont (01.08.25 @ 13:00) >  IMPRESSION:  Limited by motion and absence of IV contrast.  Displaced intimal calcifications in the mid to distal descending thoracic   aorta(602:65) consistent with dissection.  Right upper lobe groundglass opacities versus motion artifact. No focal   consolidation. Trace bilateral pleural effusions.  1.4 cm pleural-based right lower lobe nodule. Recommend comparison with   any priorstudies. If none are available, consider PET/CT.  Cardiomegaly. Aortic valve repair.    Assessment and Plan:   75yo woman with PMH of HTN, DM2, afib, COPD/asthma, bronchiectasis, tracheomalacia s/p tracheloplasty, Epilepsy, and Hx of dissection/aneurysm of descending thoracic aorta, s/p AVR and colon cancer was admitted with SOB.   Chest CT showed some GGOs and one nodule, no consolidation.     This is a COPD exacerbation patient, they benefit from treatment with azithromycin, there are some questionable GGO and one nodule in lung, less likely pneumonia.     # COPD exacerbation  # R/O Pneumonia     - Can follow CBC, Tmax, Creat and cultures   - Start azithromycin 500mg one dose nd then 4 days of 250mg   - Pulmonary follow up   - RVP  - Possibly needs bronch after discharge   - Will send QuantiFeron      Thank you for courtesy of this consult.     Will follow.  Discussed with the primary team.     Stanton Peng MD  Division of Infectious Diseases   Please call ID service at [phone] with any question.    75 minutes spent on total encounter assessing patient, examination, chart review, counseling and coordinating care by the attending physician/nurse/care manager.   [facility]  INFECTIOUS DISEASES   [address] [place] [address]  Tel: [phone]     Fax: [phone]  ========================================================  MD Daquan Moore Michelle, MD Shah, Kaushal, MD Sunjit, Jaspal, MD Sehrish Shahid, MD   ========================================================    [id number]  RAYRAY RODRIGUEZ     CC: Patient is a 74y old  Female who presents with a chief complaint of COPD Exacerbation (09 Jan 2025 10:42)    HPI:  74yFemale pmhx of Epilepsy on Keppra, COPD, asthma on chronic steroids, bronchiectasis, tracheomalacia s/p tracheloplasty HTN, Hx of dissection of descending thoracic aorta, , hx of aortic aneurysm,  Type B 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  Evaluated by Dr. Antonio,  Type A dissection s/p bioAVR with Bental procedure (9/2022), severe AS s/p TAVR (9/10/2023), TIA's, DVT Afib on Eliquis, T2DM, Colon cancer, neuropathy, presents to the ED for shortness of breath. Patient reports for the past 1-2 days having increased chest pressure out of her baseline, states she has also had cough, wheezing and shortness of breath with minimal relief after using her normal regimen of inhalers and nebulizers. Patient denies any fevers since onset of symptoms, however states her legs have been on/off swelling for many years. Patient of note has had multiple episodes and hospital visits for PNA over the past 2-3 months recent discharge on 12/12/24 at [facility] treated with ertapanem premedicated with benadryl. Pt otherwise denies any other concerns or complaints at this time, no fever, headache, cp, n/v/d.    ED course  Vitals: T 98.1F, HR 54, /64, RR 18, SpO2 99% on 2L NC  Significant labs: WBC 10.25, Hgb 11.7, Hct 37.6  Na 141, K 5.1, Cr .83  Lactate 2.5  UA Trace leuk esterase    PAST MEDICAL & SURGICAL HISTORY:  History of COPD  Afib  Aortic valve replaced  Epilepsy  No significant past surgical history    Social Hx: No current smoking, EtOH or drugs     FAMILY HISTORY:  Noncontributory     Allergies  aspirin (Unknown)  ampicillin (Unknown)  Valium (Unknown)  codeine (Unknown)  cefepime (Short breath (Severe))  Percocet (Unknown)  penicillin (Unknown)  Avelox (Unknown)    MEDICATIONS  (STANDING):  dextrose 5%. 1000 milliLiter(s) (50 mL/Hr) IV Continuous <Continuous>  dextrose 5%. 1000 milliLiter(s) (100 mL/Hr) IV Continuous <Continuous>  dextrose 50% Injectable 25 Gram(s) IV Push once  dextrose 50% Injectable 12.5 Gram(s) IV Push once  dextrose 50% Injectable 25 Gram(s) IV Push once  fluticasone propionate/ salmeterol 250-50 MICROgram(s) Diskus 1 Dose(s) Inhalation two times a day  gabapentin 200 milliGRAM(s) Oral three times a day  glucagon  Injectable 1 milliGRAM(s) IntraMuscular once  influenza  Vaccine (HIGH DOSE) 0.5 milliLiter(s) IntraMuscular once  insulin glargine Injectable (LANTUS) 25 Unit(s) SubCutaneous at bedtime  insulin lispro (ADMELOG) corrective regimen sliding scale   SubCutaneous three times a day before meals  insulin lispro Injectable (ADMELOG) 2 Unit(s) SubCutaneous three times a day before meals  labetalol 100 milliGRAM(s) Oral every 8 hours  levETIRAcetam 1000 milliGRAM(s) Oral two times a day  megestrol 20 milliGRAM(s) Oral daily  mexiletine 200 milliGRAM(s) Oral three times a day  mineral oil 30 milliLiter(s) Oral two times a day  NIFEdipine XL 30 milliGRAM(s) Oral daily  polyethylene glycol 3350 17 Gram(s) Oral two times a day  predniSONE   Tablet 20 milliGRAM(s) Oral daily  rosuvastatin 5 milliGRAM(s) Oral at bedtime  sertraline 50 milliGRAM(s) Oral daily     REVIEW OF SYSTEMS:  CONSTITUTIONAL:  No Fever or chills  HEENT:  No diplopia or blurred vision.  No sore throat or runny nose.  CARDIOVASCULAR:  No chest pain   RESPIRATORY:  +cough, no shortness of breath  GASTROINTESTINAL:  No nausea, vomiting or diarrhea.  GENITOURINARY:  No dysuria, frequency or urgency. No Blood in urine  MUSCULOSKELETAL:  no joint aches, no muscle pain  SKIN:  No change in skin, hair or nails.    Physical Exam:  Vital Signs Last 24 Hrs  T(C): 36.6 (09 Jan 2025 11:57), Max: 36.6 (08 Jan 2025 21:31)  T(F): 97.9 (09 Jan 2025 11:57), Max: 97.9 (08 Jan 2025 21:31)  HR: 68 (09 Jan 2025 11:57) (50 - 69)  BP: 128/70 (09 Jan 2025 11:57) (125/58 - 152/77)  BP(mean): --  RR: 20 (09 Jan 2025 11:57) (16 - 20)  SpO2: 93% (09 Jan 2025 11:57) (93% - 98%)  Parameters below as of 09 Jan 2025 11:57  Patient On (Oxygen Delivery Method): room air  Height (cm): 170.2 (01-09 @ 10:42)  Weight (kg): 70.261 (01-09 @ 10:42)  BMI (kg/m2): 24.3 (01-09 @ 10:42)  BSA (m2): 1.81 (01-09 @ 10:42)  GEN: NAD  HEENT: normocephalic and atraumatic.   NECK: Supple.  No lymphadenopathy   LUNGS: scattered rhonchi   HEART: Regular rate and rhythm   ABDOMEN: Soft, nontender, and nondistended.  EXTREMITIES: Without edema.  NEUROLOGIC: grossly intact.  PSYCHIATRIC: Appropriate affect .  SKIN: No rash     Labs:  01-09    140  |  108  |  19  ----------------------------<  280[H]  4.3   |  26  |  0.80    Ca    9.2      09 Jan 2025 05:16    TPro  6.5  /  Alb  3.2[L]  /  TBili  0.3  /  DBili  x   /  AST  9[L]  /  ALT  18  /  AlkPhos  78  01-09                        11.3   11.72 )-----------( 255      ( 09 Jan 2025 05:16 )             35.6     PT/INR - ( 08 Jan 2025 11:50 )   PT: 16.2 sec;   INR: 1.38 ratio    PTT - ( 08 Jan 2025 11:50 )  PTT:39.6 sec  Urinalysis Basic - ( 09 Jan 2025 05:16 )    Color: x / Appearance: x / SG: x / pH: x  Gluc: 280 mg/dL / Ketone: x  / Bili: x / Urobili: x   Blood: x / Protein: x / Nitrite: x   Leuk Esterase: x / RBC: x / WBC x   Sq Epi: x / Non Sq Epi: x / Bacteria: x    LIVER FUNCTIONS - ( 09 Jan 2025 05:16 )  Alb: 3.2 g/dL / Pro: 6.5 g/dL / ALK PHOS: 78 U/L / ALT: 18 U/L / AST: 9 U/L / GGT: x           SARS-CoV-2 Result: NotDetec (01-08-25 @ 11:50)  SARS-CoV-2: NotDetec (01-08-25 @ 11:50)    RECENT CULTURES:  01-08 @ 11:50      NotDetec    All imaging and other data have been reviewed.  < from: CT Angio Abdomen and Pelvis w/ IV Cont (01.08.25 @ 14:50) >  IMPRESSION:  Type A aortic dissection extending from the proximal aortic arch,   innominate artery level, to the infrarenal abdominal aorta segment, above   the bifurcation. There are no complications of vessel occlusion or and   organ damage. Read above text for additional findings and detailed   explanations.    < from: CT Chest No Cont (01.08.25 @ 13:00) >  IMPRESSION:  Limited by motion and absence of IV contrast.  Displaced intimal calcifications in the mid to distal descending thoracic   aorta(602:65) consistent with dissection.  Right upper lobe groundglass opacities versus motion artifact. No focal   consolidation. Trace bilateral pleural effusions.  1.4 cm pleural-based right lower lobe nodule. Recommend comparison with   any priorstudies. If none are available, consider PET/CT.  Cardiomegaly. Aortic valve repair.    Assessment and Plan:   75yo woman with PMH of HTN, DM2, afib, COPD/asthma, bronchiectasis, tracheomalacia s/p tracheloplasty, Epilepsy, and Hx of dissection/aneurysm of descending thoracic aorta, s/p AVR and colon cancer was admitted with SOB.   Chest CT showed some GGOs and one nodule, no consolidation.     This is a COPD exacerbation patient, they benefit from treatment with azithromycin, there are some questionable GGO and one nodule in lung, less likely pneumonia.     # COPD exacerbation  # R/O Pneumonia     - Can follow CBC, Tmax, Creat and cultures   - Start azithromycin 500mg one dose nd then 4 days of 250mg   - Pulmonary follow up   - RVP  - Possibly needs bronch after discharge   - Will send QuantiFeron      Thank you for courtesy of this consult.     Will follow.  Discussed with the primary team.     Stanton Peng MD  Division of Infectious Diseases   Please call ID service at [phone] with any question.    75 minutes spent on total encounter assessing patient, examination, chart review, counseling and coordinating care by the attending physician/nurse/care manager.

## 2025-01-09 NOTE — PROGRESS NOTE ADULT - PROBLEM SELECTOR PLAN 1
Hx of COPD,  Ac on chronic hypoxic respiratory failure  not requiring home O2  - Patient sob wheezing  - CT chest --> B/L Trace pleural effusion   - Received 1x doxy in ED  - Start Azithromycin PO  - Pulm (Dr Luis) consulted recs appreciated  -S/P IV Solumedrol continue  Pt's on Home steroids daily  RVP- neg  ID Dr. Peng following, Started azithromycin 500mg one dose nd then 4 days of 250mg Hx of COPD,  Ac on chronic hypoxic respiratory failure  not requiring home O2 ,pt is Off O2 NC  - Patient sob wheezing  - CT chest --> B/L Trace pleural effusion   - Received 1x doxy in ED  - Start Azithromycin PO x 5 days as per Dr Peng  - Blue (Dr Luis) consulted recs appreciated  -S/P IV Solumedrol in ER   Pt's on Home steroids daily  RVP- neg  ID Dr. Peng following, Started azithromycin 500mg one dose nd then 4 days of 250mg Hx of COPD,  Ac on chronic hypoxic respiratory failure  not requiring home O2 ,pt is Off O2 NC  - Patient sob wheezing  - CT chest --> B/L Trace pleural effusion   - Received 1x doxy in ED  - Start Azithromycin PO x 5 days as per Dr Peng  - Pulkaty (Dr Luis) consulted recs appreciated--> resume Home meds   -S/P IV Solumedrol in ER   Pt's on Home steroids daily  RVP- neg  ID Dr. Peng following, Started azithromycin 500mg one dose nd then 4 days of 250mg

## 2025-01-09 NOTE — CONSULT NOTE ADULT - CONSULT REASON
Chest Pain
o2 support  copd  non smoker
COPD exacerbation
74y  diabetes mellitus uncontrolled type 2

## 2025-01-09 NOTE — GOALS OF CARE CONVERSATION - ADVANCED CARE PLANNING - CONVERSATION DETAILS
Spoke to pt about GOC. Pt states she has a HCP and her daughter knows her wishes and does not wish to discuss further.

## 2025-01-09 NOTE — CONSULT NOTE ADULT - PROBLEM SELECTOR RECOMMENDATION 9
Type 2 A1c penidng% adm PNA  add 10 units lantus in AM  25 units lantus PM  add 2 units admelgo TIDAC  c/w RAJWINDER ACHS  CC diet and accucheck ACHS  Recommend endocrine-Perlman onconsult  FU appt: Dr Panchal, pt has appt  DSC recommendations: return to home regimen lantus 18 units AM and 30 units PM, add 2 units humalog premeal TIDAC and c/w ISS  and glucose monitoring, lifestyle and diet modifications  diabetes education provided as documented above  Diabetes support info and cell # [phone] given   Goal 100-180 mg/dL; 140-180 mg/dL in critical care areas Type 2 A1c penidng% adm PNA  add 10 units lantus in AM  25 units lantus PM  add 2 units admelgo TIDAC  c/w RAJWINDER ACHS  CC diet and accucheck ACHS  Recommend endocrine-Perlman onconsult  FU appt: Dr Panchal, pt has appt 1/22  DSC recommendations: return to home regimen lantus 18 units AM and 30 units PM, add 2 units humalog premeal TIDAC and c/w ISS  and glucose monitoring, lifestyle and diet modifications  diabetes education provided as documented above  Diabetes support info and cell # [phone] given   Goal 100-180 mg/dL; 140-180 mg/dL in critical care areas

## 2025-01-09 NOTE — PHYSICAL THERAPY INITIAL EVALUATION ADULT - ADDITIONAL COMMENTS
Pt lives in house.  Pt report has a 24/7 HHA and ambulates holding on to her.  Pt has rolling walker at home.  Limited community ambulation due to weather

## 2025-01-09 NOTE — CASE MANAGEMENT PROGRESS NOTE - NSCMPROGRESSNOTE_GEN_ALL_CORE
Pt admitted from home with pneumonia, COPD. Per CC assessment, Pt has Pca through [facility] waiver program 12 hr x 7 days. Pts daughter is the [employer] aide and works 40 hrs a week. Pt requires 517 form to be completed and faxed to [facility] [phone] Peg Lakhani to resume services pta

## 2025-01-09 NOTE — PROGRESS NOTE ADULT - ASSESSMENT
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  Evaluated by Dr. 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  Admitting for COPD exacerbation, Acute on chronic hypoxic respiratory failure

## 2025-01-09 NOTE — DISCHARGE NOTE PROVIDER - HOSPITAL COURSE
FROM ADMISSION H+P:   HPI:  74yFemale pmhx of Epilepsy on Keppra, COPD, asthma on chronic steroids, bronchiectasis, tracheomalacia s/p tracheloplasty HTN, Hx of dissection of descending thoracic aorta, , hx of aortic aneurysm,  Type B 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  Evaluated by Dr. Antonio,  Type A dissection s/p bioAVR with Bental procedure (9/2022), severe AS s/p TAVR (9/10/2023), TIA's, DVT Afib on Eliquis, T2DM, Colon cancer, neuropathy, presents to the ED for shortness of breath. Patient reports for the past 1-2 days having increased chest pressure out of her baseline, states she has also had cough, wheezing and shortness of breath with minimal relief after using her normal regimen of inhalers and nebulizers. Patient denies any fevers since onset of symptoms, however states her legs have been on/off swelling for many years. Patient of note has had multiple episodes and hospital visits for PNA over the past 2-3 months recent discharge on 12/12/24 at [facility] treated with ertapanem premedicated with benadryl. Pt otherwise denies any other concerns or complaints at this time, no fever, headache, cp, n/v/d.    ED course  Vitals: T 98.1F, HR 54, /64, RR 18, SpO2 99% on 2L NC  Significant labs: WBC 10.25, Hgb 11.7, Hct 37.6  Na 141, K 5.1, Cr .83  Lactate 2.5  UA Trace leuk esterase  Imaging:   CT CHEST --> Limited by motion and absence of IV contrast.    Displaced intimal calcifications in the mid to distal descending thoracic   aorta (602:65) consistent with dissection.  Right upper lobe groundglass opacities versus motion artifact. No focal   consolidation. Trace bilateral pleural effusions.  1.4 cm pleural-based right lower lobe nodule.   Cardiomegaly. Aortic valve repair.   CT Angio C/A/P    IMPRESSION:  Type A aortic dissection extending from the proximal aortic arch,   innominate artery level, to the infrarenal abdominal aorta segment, above   the bifurcation. There are no complications of vessel occlusion or and   organ damage. Read above text for additional findings and detailed   explanations.      CXR --> interstitial chf  EKG: RBBB  In ED patient given: doxy 100 1x, duoneb x2, solumedrol x1, benadryl x1, 1L NS x1  ED MD d/w CT Sx at [facility] about CT Angio C/A/P abnormal  results- no surgical intervention , medical management    (08 Jan 2025 14:39)      ---  HOSPITAL COURSE/PERTINENT LABS/PROCEDURES PERFORMED/PENDING TESTS:  Patient was admitted for COPD exacerbation. Patient was seen and evaluated by pulmonology, infectious disease, cardiology, and diabetes. Infectious disease recommended starting azithromycin PO. Patient was treated with azithromycin PO with plan for 5 days total treatment. Pulmonology recommended, Advair - Proventil PRN - low dose Prednisone.      Physical therapy evaluated the patient and recommended home PT. Social work/case management followed the patient case. Patient is stable for discharge to home with home PT.    ---  PATIENT CONDITION:  - stable    ---  PHYSICAL EXAM ON DAY OF DISCHARGE:  Please see progress note on day of discharge for more information.    ---  CONSULTANTS:   Infectious disease - Dr. Peng   Pulmonology - Dr. Luis  Diabetes NP - Nain Fine  Cardiology - Dr. Peterson Fernandez  ---  ADVANCED CARE PLANNING:  - Code status:    Full code  - MOLST completed:      [ x ] NO     [  ] YES    ---  TIME SPENT:  I, the attending physician, was physically present for the key portions of the evaluation and management (E/M) service provided. The total amount of time spent reviewing the hospital notes, laboratory values, imaging findings, assessing/counseling the patient, discussing with consultant physicians, social work, nursing staff was -- minutes FROM ADMISSION H+P:   HPI:  74yFemale pmhx of Epilepsy on Keppra, COPD, asthma on chronic steroids, bronchiectasis, tracheomalacia s/p tracheloplasty HTN, Hx of dissection of descending thoracic aorta, , hx of aortic aneurysm,  Type B 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  Evaluated by Dr. Antonio,  Type A dissection s/p bioAVR with Bental procedure (9/2022), severe AS s/p TAVR (9/10/2023), TIA's, DVT Afib on Eliquis, T2DM, Colon cancer, neuropathy, presents to the ED for shortness of breath. Patient reports for the past 1-2 days having increased chest pressure out of her baseline, states she has also had cough, wheezing and shortness of breath with minimal relief after using her normal regimen of inhalers and nebulizers. Patient denies any fevers since onset of symptoms, however states her legs have been on/off swelling for many years. Patient of note has had multiple episodes and hospital visits for PNA over the past 2-3 months recent discharge on 12/12/24 at [facility] treated with ertapanem premedicated with benadryl. Pt otherwise denies any other concerns or complaints at this time, no fever, headache, cp, n/v/d.    ED course  Vitals: T 98.1F, HR 54, /64, RR 18, SpO2 99% on 2L NC  Significant labs: WBC 10.25, Hgb 11.7, Hct 37.6  Na 141, K 5.1, Cr .83  Lactate 2.5  UA Trace leuk esterase  Imaging:   CT CHEST --> Limited by motion and absence of IV contrast.    Displaced intimal calcifications in the mid to distal descending thoracic   aorta (602:65) consistent with dissection.  Right upper lobe groundglass opacities versus motion artifact. No focal   consolidation. Trace bilateral pleural effusions.  1.4 cm pleural-based right lower lobe nodule.   Cardiomegaly. Aortic valve repair.   CT Angio C/A/P    IMPRESSION:  Type A aortic dissection extending from the proximal aortic arch,   innominate artery level, to the infrarenal abdominal aorta segment, above   the bifurcation. There are no complications of vessel occlusion or and   organ damage. Read above text for additional findings and detailed   explanations.      CXR --> interstitial chf  EKG: RBBB  In ED patient given: doxy 100 1x, duoneb x2, solumedrol x1, benadryl x1, 1L NS x1  ED MD d/w CT Sx at [facility] about CT Angio C/A/P abnormal  results- no surgical intervention , medical management    (08 Jan 2025 14:39)      ---  HOSPITAL COURSE/PERTINENT LABS/PROCEDURES PERFORMED/PENDING TESTS:  Patient was admitted for COPD exacerbation. Patient was seen and evaluated by pulmonology, infectious disease, cardiology, and diabetes. Infectious disease recommended starting azithromycin PO. Patient was treated with azithromycin PO with plan for 5 days total treatment. Pulmonology recommended, Advair - Proventil PRN - low dose Prednisone. Patient will continue to take her home medications and will continue azithromycin to complete 5 days of treatment.   Physical therapy evaluated the patient and recommended home PT. Social work/case management followed the patient case. Patient is stable for discharge to home with home PT.    ---  PATIENT CONDITION:  - stable    ---  PHYSICAL EXAM ON DAY OF DISCHARGE:  Please see progress note on day of discharge for more information.    ---  CONSULTANTS:   Infectious disease - Dr. Peng   Pulmonology - Dr. Luis  Diabetes NP - Nain Fine  Cardiology - Dr. Peterson Fernandez  ---  ADVANCED CARE PLANNING:  - Code status:    Full code  - MOLST completed:      [ x ] NO     [  ] YES    ---  TIME SPENT:  I, the attending physician, was physically present for the key portions of the evaluation and management (E/M) service provided. The total amount of time spent reviewing the hospital notes, laboratory values, imaging findings, assessing/counseling the patient, discussing with consultant physicians, social work, nursing staff was -- minutes FROM ADMISSION H+P:   HPI:  74yFemale pmhx of Epilepsy on Keppra, COPD, asthma on chronic steroids, bronchiectasis, tracheomalacia s/p tracheloplasty HTN, Hx of dissection of descending thoracic aorta, , hx of aortic aneurysm,  Type B 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  Evaluated by Dr. Antonio,  Type A dissection s/p bioAVR with Bental procedure (9/2022), severe AS s/p TAVR (9/10/2023), TIA's, DVT Afib on Eliquis, T2DM, Colon cancer, neuropathy, presents to the ED for shortness of breath. Patient reports for the past 1-2 days having increased chest pressure out of her baseline, states she has also had cough, wheezing and shortness of breath with minimal relief after using her normal regimen of inhalers and nebulizers. Patient denies any fevers since onset of symptoms, however states her legs have been on/off swelling for many years. Patient of note has had multiple episodes and hospital visits for PNA over the past 2-3 months recent discharge on 12/12/24 at [facility] treated with ertapanem premedicated with benadryl. Pt otherwise denies any other concerns or complaints at this time, no fever, headache, cp, n/v/d.    ED course  Vitals: T 98.1F, HR 54, /64, RR 18, SpO2 99% on 2L NC  Significant labs: WBC 10.25, Hgb 11.7, Hct 37.6  Na 141, K 5.1, Cr .83  Lactate 2.5  UA Trace leuk esterase  Imaging:   CT CHEST --> Limited by motion and absence of IV contrast.    Displaced intimal calcifications in the mid to distal descending thoracic   aorta (602:65) consistent with dissection.  Right upper lobe groundglass opacities versus motion artifact. No focal   consolidation. Trace bilateral pleural effusions.  1.4 cm pleural-based right lower lobe nodule.   Cardiomegaly. Aortic valve repair.   CT Angio C/A/P    IMPRESSION:  Type A aortic dissection extending from the proximal aortic arch,   innominate artery level, to the infrarenal abdominal aorta segment, above   the bifurcation. There are no complications of vessel occlusion or and   organ damage. Read above text for additional findings and detailed   explanations.      CXR --> interstitial chf  EKG: RBBB  In ED patient given: doxy 100 1x, duoneb x2, solumedrol x1, benadryl x1, 1L NS x1  ED MD d/w CT Sx at [facility] about CT Angio C/A/P abnormal  results- no surgical intervention , medical management    (08 Jan 2025 14:39)      ---  HOSPITAL COURSE/PERTINENT LABS/PROCEDURES PERFORMED/PENDING TESTS:  Patient was admitted for COPD exacerbation. Patient was seen and evaluated by pulmonology, infectious disease, cardiology, and diabetes. Infectious disease recommended starting azithromycin PO. Patient was treated with azithromycin PO with plan for 5 days total treatment. Pulmonology recommended, Advair - Proventil PRN - low dose Prednisone. Patient will continue to take her home medications and will continue azithromycin to complete 5 days of treatment.   Physical therapy evaluated the patient and recommended home PT. Social work/case management followed the patient case. Patient is stable for discharge to home with home PT.    ---  PATIENT CONDITION:  - stable    ---  PHYSICAL EXAM ON DAY OF DISCHARGE:  Please see progress note on day of discharge for more information.    ---  CONSULTANTS:   Infectious disease - Dr. Peng   Pulmonology - Dr. Luis  Diabetes NP - Nain Fine  Cardiology - Dr. Peterson Fernandez  ---  ADVANCED CARE PLANNING:  - Code status:    Full code  - MOLST completed:      [ x ] NO     [  ] YES    ---  TIME SPENT:  I, the attending physician, was physically present for the key portions of the evaluation and management (E/M) service provided. The total amount of time spent reviewing the hospital notes, laboratory values, imaging findings, assessing/counseling the patient, discussing with consultant physicians, social work, nursing staff was 60 minutes

## 2025-01-09 NOTE — CONSULT NOTE ADULT - SUBJECTIVE AND OBJECTIVE BOX
Patient is a 74y old  Female who presents with a chief complaint of COPD Exacerbation (09 Jan 2025 10:23)    Type: 2 DM DX 10 years, known complications, reports blurry vision Endocrine Dr Moreno Panchal, seen august 2024 q3 months,  last a1c 7.9% 1/4 Rx home lantus 18 units in AM (takes ~ 10am) 33 units @ PM, novolog -200 3 units, 201-250 5 units, 251-300 7 units, 300+ 9 units. pt on chronic medrol PO tezspire for asthma, uses freestyle chin 3 cgm, reviewed 54% TIR, ocassionaly hypoglycemia overnight, discussed s/s and managemnet w/ 15/15 rule, treat until BG >100mg/dL, -300 before lunch, explained pathophysiology of t2DM, reviewed S/S of persistnet hyperglycemia affect of steroids on hyperglycemia, pt doesn't take humalog before breakfast if BG <150 recc taking 2 units pre-meal standing. review CC diet, carb identification/poriton control, priortizing lean protein and nonstarchy vegetables. pt has all supplies @ home, verbal education provided  diabetes education provided- A1c measure and BG targets  fasting, <180 2 hours postmeal. insulin type, onset and duration medication MOA and considerations/side effects, inhospital BGM frequency and insulin administration, s/s of hyperglycemia/hypoglycemia and management, glycemic control and preventing complications, consistent carb diet, balanced plate method, consistent meal planning. sick day management, provider f/u    HPI:  74yFemale pmhx of Epilepsy on Keppra, COPD, asthma on chronic steroids, bronchiectasis, tracheomalacia s/p tracheloplasty HTN, Hx of dissection of descending thoracic aorta, , hx of aortic aneurysm,  Type B 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  Evaluated by Dr. Antonio,  Type A dissection s/p bioAVR with Bental procedure (9/2022), severe AS s/p TAVR (9/10/2023), TIA's, DVT Afib on Eliquis, T2DM, Colon cancer, neuropathy, presents to the ED for shortness of breath. Patient reports for the past 1-2 days having increased chest pressure out of her baseline, states she has also had cough, wheezing and shortness of breath with minimal relief after using her normal regimen of inhalers and nebulizers. Patient denies any fevers since onset of symptoms, however states her legs have been on/off swelling for many years. Patient of note has had multiple episodes and hospital visits for PNA over the past 2-3 months recent discharge on 12/12/24 at [facility] treated with ertapanem premedicated with benadryl. Pt otherwise denies any other concerns or complaints at this time, no fever, headache, cp, n/v/d.    ED course  Vitals: T 98.1F, HR 54, /64, RR 18, SpO2 99% on 2L NC  Significant labs: WBC 10.25, Hgb 11.7, Hct 37.6  Na 141, K 5.1, Cr .83  Lactate 2.5  UA Trace leuk esterase  Imaging:   CT CHEST --> Limited by motion and absence of IV contrast.    Displaced intimal calcifications in the mid to distal descending thoracic   aorta (602:65) consistent with dissection.  Right upper lobe groundglass opacities versus motion artifact. No focal   consolidation. Trace bilateral pleural effusions.  1.4 cm pleural-based right lower lobe nodule.   Cardiomegaly. Aortic valve repair.   CT Angio C/A/P    IMPRESSION:  Type A aortic dissection extending from the proximal aortic arch,   innominate artery level, to the infrarenal abdominal aorta segment, above   the bifurcation. There are no complications of vessel occlusion or and   organ damage. Read above text for additional findings and detailed   explanations.      CXR --> interstitial chf  EKG: RBBB  In ED patient given: doxy 100 1x, duoneb x2, solumedrol x1, benadryl x1, 1L NS x1  ED MD d/w CT Sx at [facility] about CT Angio C/A/P abnormal  results- no surgical intervention , medical management    (08 Jan 2025 14:39)      PAST MEDICAL & SURGICAL HISTORY:  History of COPD      Afib      Aortic valve replaced      Epilepsy      No significant past surgical history          REVIEW OF SYSTEMS  General:	as above  Respiratory: NAD, No SOB, no cough  Cardiovascular: No chest pain, no palpitations	  Endocrine: no polyuria, no polydipsia, or S/S of hypoglycemia  Neuro: denies numbess, no tingling	  Other:	      Allergies    aspirin (Unknown)  ampicillin (Unknown)  Valium (Unknown)  codeine (Unknown)  cefepime (Short breath (Severe))  Percocet (Unknown)  penicillin (Unknown)  Avelox (Unknown)    Intolerances        MEDICATIONS  (STANDING):  dextrose 5%. 1000 milliLiter(s) (50 mL/Hr) IV Continuous <Continuous>  dextrose 5%. 1000 milliLiter(s) (100 mL/Hr) IV Continuous <Continuous>  dextrose 50% Injectable 25 Gram(s) IV Push once  dextrose 50% Injectable 12.5 Gram(s) IV Push once  dextrose 50% Injectable 25 Gram(s) IV Push once  fluticasone propionate/ salmeterol 250-50 MICROgram(s) Diskus 1 Dose(s) Inhalation two times a day  gabapentin 200 milliGRAM(s) Oral three times a day  glucagon  Injectable 1 milliGRAM(s) IntraMuscular once  influenza  Vaccine (HIGH DOSE) 0.5 milliLiter(s) IntraMuscular once  insulin glargine Injectable (LANTUS) 25 Unit(s) SubCutaneous at bedtime  insulin lispro (ADMELOG) corrective regimen sliding scale   SubCutaneous three times a day before meals  insulin lispro Injectable (ADMELOG) 2 Unit(s) SubCutaneous three times a day before meals  labetalol 100 milliGRAM(s) Oral every 8 hours  levETIRAcetam 1000 milliGRAM(s) Oral two times a day  megestrol 20 milliGRAM(s) Oral daily  mexiletine 200 milliGRAM(s) Oral three times a day  NIFEdipine XL 30 milliGRAM(s) Oral daily  predniSONE   Tablet 20 milliGRAM(s) Oral daily  rosuvastatin 5 milliGRAM(s) Oral at bedtime  sertraline 50 milliGRAM(s) Oral daily

## 2025-01-09 NOTE — DISCHARGE NOTE PROVIDER - NSDCCPCAREPLAN_GEN_ALL_CORE_FT
PRINCIPAL DISCHARGE DIAGNOSIS  Diagnosis: COPD exacerbation  Assessment and Plan of Treatment: You came to the hospital with shortness of breath.   You were seen by both pulmonology and infectious disease.  You were started on azithromycin by mouth.   Please continue azithromycin 250mg daily.   Last day of medication is Monday, 1/13/2025.   Please continue to take your at home inhaler regimen and home steroid medication.   Please follow up with your primary care doctor and pulmonologist within 1 week of discharge from the hospital.         SECONDARY DISCHARGE DIAGNOSES  Diagnosis: History of aortic aneurysm  Assessment and Plan of Treatment: You have a known history of aortic aneurysm.   Your CT scan showed the following findings:  CT: Type A aortic dissection extending from the proximal aortic arch, innominate artery level, to the infrarenal abdominal aorta segment, above the bifurcation. There are no complications of vessel occlusion or and organ damage.  Your findings were discussed with cardiothoracic sugery who recommened no acute intervention.   Please follow up with your primary care doctor and/or cardiologist within 1 week of discharge from the hospital.     PRINCIPAL DISCHARGE DIAGNOSIS  Diagnosis: COPD exacerbation  Assessment and Plan of Treatment: You came to the hospital with shortness of breath. Acute on chronic COPD exacerbation   You were seen by both pulmonology and infectious disease.  You were started on azithromycin 1 tab daily x 5 days total  by mouth.   Please continue azithromycin 250mg daily.   Last day of medication is Monday, 1/13/2025.   Please continue to take your at home inhaler regimen and home steroid medication.   Continue All your Home meds & Inh for COPD  Please follow up with your primary care doctor and pulmonologist within 1 week of discharge from the hospital.         SECONDARY DISCHARGE DIAGNOSES  Diagnosis: Chronic atrial fibrillation  Assessment and Plan of Treatment: Continue Home meds Eliquis 2x day  On Mexilitine    Diagnosis: HTN (hypertension)  Assessment and Plan of Treatment: continue Home meds Labetalol, Nifidipine    Diagnosis: Epilepsy  Assessment and Plan of Treatment: On Keppra 2x day    Diagnosis: History of TIAs  Assessment and Plan of Treatment: On Plavix daily    Diagnosis: T2DM (type 2 diabetes mellitus)  Assessment and Plan of Treatment: Continue all your Home Lantus & Humalog as before    Diagnosis: History of aortic aneurysm  Assessment and Plan of Treatment: You have a known history of aortic aneurysm.   Your CT scan showed the following findings:  CT: Type A aortic dissection extending from the proximal aortic arch, innominate artery level, to the infrarenal abdominal aorta segment, above the bifurcation. There are no complications of vessel occlusion or and organ damage.  Your findings were discussed with cardiothoracic sugery who recommened no acute intervention.   Follow up with vascular Sx as out pt at [facility]-No surgical intervention  Please follow up with your primary care doctor and/or cardiologist within 1 week of discharge from the hospital.    Diagnosis: Asthma  Assessment and Plan of Treatment: Continue Home Steroids daily  follow with DR Allison    Diagnosis: Neuropathy  Assessment and Plan of Treatment: Continue Gabapentin as before

## 2025-01-09 NOTE — PROGRESS NOTE ADULT - PROBLEM SELECTOR PLAN 8
Chronic  - On home Lantus 30U in pm, 18 in AM, Novolog sliding scale  - Lantus 10U AM, 25U PM  - ISS, fingersticks, hypoglycemia protocol  - a1c 7.8  - Diabetes NP Nain hurtado

## 2025-01-10 ENCOUNTER — TRANSCRIPTION ENCOUNTER (OUTPATIENT)
Age: 75
End: 2025-01-10

## 2025-01-10 VITALS — OXYGEN SATURATION: 96 %

## 2025-01-10 DIAGNOSIS — Z95.2 PRESENCE OF PROSTHETIC HEART VALVE: Chronic | ICD-10-CM

## 2025-01-10 DIAGNOSIS — I71.9 AORTIC ANEURYSM OF UNSPECIFIED SITE, WITHOUT RUPTURE: ICD-10-CM

## 2025-01-10 DIAGNOSIS — J44.9 CHRONIC OBSTRUCTIVE PULMONARY DISEASE, UNSPECIFIED: ICD-10-CM

## 2025-01-10 DIAGNOSIS — I48.91 UNSPECIFIED ATRIAL FIBRILLATION: ICD-10-CM

## 2025-01-10 DIAGNOSIS — G40.909 EPILEPSY, UNSPECIFIED, NOT INTRACTABLE, WITHOUT STATUS EPILEPTICUS: ICD-10-CM

## 2025-01-10 DIAGNOSIS — E11.9 TYPE 2 DIABETES MELLITUS WITHOUT COMPLICATIONS: ICD-10-CM

## 2025-01-10 DIAGNOSIS — Z93.3 COLOSTOMY STATUS: Chronic | ICD-10-CM

## 2025-01-10 DIAGNOSIS — M79.673 PAIN IN UNSPECIFIED FOOT: ICD-10-CM

## 2025-01-10 DIAGNOSIS — I71.012 DISSECTION OF DESCENDING THORACIC AORTA: ICD-10-CM

## 2025-01-10 DIAGNOSIS — H54.7 UNSPECIFIED VISUAL LOSS: ICD-10-CM

## 2025-01-10 DIAGNOSIS — C21.0 MALIGNANT NEOPLASM OF ANUS, UNSPECIFIED: ICD-10-CM

## 2025-01-10 LAB
ALBUMIN SERPL ELPH-MCNC: 3.2 G/DL — LOW (ref 3.3–5)
ALP SERPL-CCNC: 71 U/L — SIGNIFICANT CHANGE UP (ref 40–120)
ALT FLD-CCNC: 18 U/L — SIGNIFICANT CHANGE UP (ref 12–78)
ANION GAP SERPL CALC-SCNC: 7 MMOL/L — SIGNIFICANT CHANGE UP (ref 5–17)
AST SERPL-CCNC: 11 U/L — LOW (ref 15–37)
BASOPHILS # BLD AUTO: 0.02 K/UL — SIGNIFICANT CHANGE UP (ref 0–0.2)
BASOPHILS NFR BLD AUTO: 0.2 % — SIGNIFICANT CHANGE UP (ref 0–2)
BILIRUB SERPL-MCNC: 0.3 MG/DL — SIGNIFICANT CHANGE UP (ref 0.2–1.2)
BUN SERPL-MCNC: 18 MG/DL — SIGNIFICANT CHANGE UP (ref 7–23)
CALCIUM SERPL-MCNC: 8.8 MG/DL — SIGNIFICANT CHANGE UP (ref 8.5–10.1)
CHLORIDE SERPL-SCNC: 110 MMOL/L — HIGH (ref 96–108)
CO2 SERPL-SCNC: 25 MMOL/L — SIGNIFICANT CHANGE UP (ref 22–31)
CREAT SERPL-MCNC: 0.52 MG/DL — SIGNIFICANT CHANGE UP (ref 0.5–1.3)
EGFR: 96 ML/MIN/1.73M2 — SIGNIFICANT CHANGE UP
EOSINOPHIL # BLD AUTO: 0.03 K/UL — SIGNIFICANT CHANGE UP (ref 0–0.5)
EOSINOPHIL NFR BLD AUTO: 0.3 % — SIGNIFICANT CHANGE UP (ref 0–6)
GLUCOSE BLDC GLUCOMTR-MCNC: 204 MG/DL — HIGH (ref 70–99)
GLUCOSE BLDC GLUCOMTR-MCNC: 222 MG/DL — HIGH (ref 70–99)
GLUCOSE BLDC GLUCOMTR-MCNC: 270 MG/DL — HIGH (ref 70–99)
GLUCOSE SERPL-MCNC: 222 MG/DL — HIGH (ref 70–99)
HCT VFR BLD CALC: 36.6 % — SIGNIFICANT CHANGE UP (ref 34.5–45)
HGB BLD-MCNC: 11.5 G/DL — SIGNIFICANT CHANGE UP (ref 11.5–15.5)
IMM GRANULOCYTES NFR BLD AUTO: 0.6 % — SIGNIFICANT CHANGE UP (ref 0–0.9)
LYMPHOCYTES # BLD AUTO: 1.21 K/UL — SIGNIFICANT CHANGE UP (ref 1–3.3)
LYMPHOCYTES # BLD AUTO: 11.2 % — LOW (ref 13–44)
MCHC RBC-ENTMCNC: 23.9 PG — LOW (ref 27–34)
MCHC RBC-ENTMCNC: 31.4 G/DL — LOW (ref 32–36)
MCV RBC AUTO: 76.1 FL — LOW (ref 80–100)
MONOCYTES # BLD AUTO: 0.74 K/UL — SIGNIFICANT CHANGE UP (ref 0–0.9)
MONOCYTES NFR BLD AUTO: 6.9 % — SIGNIFICANT CHANGE UP (ref 2–14)
NEUTROPHILS # BLD AUTO: 8.73 K/UL — HIGH (ref 1.8–7.4)
NEUTROPHILS NFR BLD AUTO: 80.8 % — HIGH (ref 43–77)
NRBC # BLD: 0 /100 WBCS — SIGNIFICANT CHANGE UP (ref 0–0)
PLATELET # BLD AUTO: 304 K/UL — SIGNIFICANT CHANGE UP (ref 150–400)
POTASSIUM SERPL-MCNC: 4.1 MMOL/L — SIGNIFICANT CHANGE UP (ref 3.5–5.3)
POTASSIUM SERPL-SCNC: 4.1 MMOL/L — SIGNIFICANT CHANGE UP (ref 3.5–5.3)
PROT SERPL-MCNC: 6.4 G/DL — SIGNIFICANT CHANGE UP (ref 6–8.3)
RBC # BLD: 4.81 M/UL — SIGNIFICANT CHANGE UP (ref 3.8–5.2)
RBC # FLD: 20.9 % — HIGH (ref 10.3–14.5)
SODIUM SERPL-SCNC: 142 MMOL/L — SIGNIFICANT CHANGE UP (ref 135–145)
WBC # BLD: 10.79 K/UL — HIGH (ref 3.8–10.5)
WBC # FLD AUTO: 10.79 K/UL — HIGH (ref 3.8–10.5)

## 2025-01-10 PROCEDURE — 83880 ASSAY OF NATRIURETIC PEPTIDE: CPT

## 2025-01-10 PROCEDURE — 71250 CT THORAX DX C-: CPT | Mod: MC

## 2025-01-10 PROCEDURE — 83605 ASSAY OF LACTIC ACID: CPT

## 2025-01-10 PROCEDURE — 85025 COMPLETE CBC W/AUTO DIFF WBC: CPT

## 2025-01-10 PROCEDURE — 97162 PT EVAL MOD COMPLEX 30 MIN: CPT

## 2025-01-10 PROCEDURE — 93005 ELECTROCARDIOGRAM TRACING: CPT

## 2025-01-10 PROCEDURE — 99232 SBSQ HOSP IP/OBS MODERATE 35: CPT

## 2025-01-10 PROCEDURE — 94640 AIRWAY INHALATION TREATMENT: CPT

## 2025-01-10 PROCEDURE — 96375 TX/PRO/DX INJ NEW DRUG ADDON: CPT

## 2025-01-10 PROCEDURE — 87040 BLOOD CULTURE FOR BACTERIA: CPT

## 2025-01-10 PROCEDURE — 94760 N-INVAS EAR/PLS OXIMETRY 1: CPT

## 2025-01-10 PROCEDURE — 99285 EMERGENCY DEPT VISIT HI MDM: CPT

## 2025-01-10 PROCEDURE — 81001 URINALYSIS AUTO W/SCOPE: CPT

## 2025-01-10 PROCEDURE — 96374 THER/PROPH/DIAG INJ IV PUSH: CPT

## 2025-01-10 PROCEDURE — 0225U NFCT DS DNA&RNA 21 SARSCOV2: CPT

## 2025-01-10 PROCEDURE — 99233 SBSQ HOSP IP/OBS HIGH 50: CPT

## 2025-01-10 PROCEDURE — 74174 CTA ABD&PLVS W/CONTRAST: CPT | Mod: MC

## 2025-01-10 PROCEDURE — 80061 LIPID PANEL: CPT

## 2025-01-10 PROCEDURE — 86480 TB TEST CELL IMMUN MEASURE: CPT

## 2025-01-10 PROCEDURE — 85730 THROMBOPLASTIN TIME PARTIAL: CPT

## 2025-01-10 PROCEDURE — G0545: CPT

## 2025-01-10 PROCEDURE — 71045 X-RAY EXAM CHEST 1 VIEW: CPT

## 2025-01-10 PROCEDURE — 82962 GLUCOSE BLOOD TEST: CPT

## 2025-01-10 PROCEDURE — 80053 COMPREHEN METABOLIC PANEL: CPT

## 2025-01-10 PROCEDURE — 83036 HEMOGLOBIN GLYCOSYLATED A1C: CPT

## 2025-01-10 PROCEDURE — 84484 ASSAY OF TROPONIN QUANT: CPT

## 2025-01-10 PROCEDURE — 85610 PROTHROMBIN TIME: CPT

## 2025-01-10 PROCEDURE — 71275 CT ANGIOGRAPHY CHEST: CPT | Mod: MC

## 2025-01-10 PROCEDURE — 87637 SARSCOV2&INF A&B&RSV AMP PRB: CPT

## 2025-01-10 PROCEDURE — 36415 COLL VENOUS BLD VENIPUNCTURE: CPT

## 2025-01-10 RX ORDER — AZITHROMYCIN MONOHYDRATE 200 MG/5ML
1 POWDER, FOR SUSPENSION ORAL
Qty: 3 | Refills: 0
Start: 2025-01-10 | End: 2025-01-12

## 2025-01-10 RX ORDER — SERTRALINE HYDROCHLORIDE 25 MG/1
1 TABLET ORAL
Qty: 0 | Refills: 0 | DISCHARGE
Start: 2025-01-10

## 2025-01-10 RX ORDER — IPRATROPIUM BROMIDE AND ALBUTEROL SULFATE .5; 2.5 MG/3ML; MG/3ML
3 SOLUTION RESPIRATORY (INHALATION) ONCE
Refills: 0 | Status: COMPLETED | OUTPATIENT
Start: 2025-01-10 | End: 2025-01-10

## 2025-01-10 RX ADMIN — NIFEDIPINE 30 MILLIGRAM(S): 60 TABLET, EXTENDED RELEASE ORAL at 05:26

## 2025-01-10 RX ADMIN — MEXILETINE HYDROCHLORIDE 200 MILLIGRAM(S): 150 CAPSULE ORAL at 05:28

## 2025-01-10 RX ADMIN — AZITHROMYCIN MONOHYDRATE 250 MILLIGRAM(S): 200 POWDER, FOR SUSPENSION ORAL at 12:27

## 2025-01-10 RX ADMIN — GABAPENTIN 200 MILLIGRAM(S): 300 CAPSULE ORAL at 13:29

## 2025-01-10 RX ADMIN — Medication 20 MILLIGRAM(S): at 05:26

## 2025-01-10 RX ADMIN — MEGESTROL ACETATE 20 MILLIGRAM(S): 40 TABLET ORAL at 13:28

## 2025-01-10 RX ADMIN — LABETALOL HCL 100 MILLIGRAM(S): 300 TABLET, FILM COATED ORAL at 05:27

## 2025-01-10 RX ADMIN — Medication 2: at 08:53

## 2025-01-10 RX ADMIN — LEVETIRACETAM 1000 MILLIGRAM(S): 100 SOLUTION ORAL at 05:26

## 2025-01-10 RX ADMIN — LABETALOL HCL 100 MILLIGRAM(S): 300 TABLET, FILM COATED ORAL at 13:29

## 2025-01-10 RX ADMIN — Medication 10 MILLILITER(S): at 05:28

## 2025-01-10 RX ADMIN — INSULIN GLARGINE-YFGN 10 UNIT(S): 100 INJECTION, SOLUTION SUBCUTANEOUS at 08:54

## 2025-01-10 RX ADMIN — MEXILETINE HYDROCHLORIDE 200 MILLIGRAM(S): 150 CAPSULE ORAL at 13:29

## 2025-01-10 RX ADMIN — SERTRALINE HYDROCHLORIDE 50 MILLIGRAM(S): 25 TABLET ORAL at 12:27

## 2025-01-10 RX ADMIN — APIXABAN 5 MILLIGRAM(S): 5 TABLET, FILM COATED ORAL at 05:27

## 2025-01-10 RX ADMIN — FLUTICASONE PROPIONATE AND SALMETEROL 1 DOSE(S): 50; 500 POWDER ORAL; RESPIRATORY (INHALATION) at 07:13

## 2025-01-10 RX ADMIN — Medication 2 UNIT(S): at 13:25

## 2025-01-10 RX ADMIN — Medication 3: at 13:25

## 2025-01-10 RX ADMIN — Medication 2 UNIT(S): at 08:54

## 2025-01-10 RX ADMIN — IPRATROPIUM BROMIDE AND ALBUTEROL SULFATE 3 MILLILITER(S): .5; 2.5 SOLUTION RESPIRATORY (INHALATION) at 14:17

## 2025-01-10 RX ADMIN — GABAPENTIN 200 MILLIGRAM(S): 300 CAPSULE ORAL at 05:26

## 2025-01-10 NOTE — PROGRESS NOTE ADULT - PROBLEM SELECTOR PLAN 6
Chronic  - cont home Keppra bid, received AM dose already  - seizure precautions
Chronic  - cont home Keppra bid, received AM dose already  - seizure precautions

## 2025-01-10 NOTE — DISCHARGE NOTE NURSING/CASE MANAGEMENT/SOCIAL WORK - FINANCIAL ASSISTANCE
[facility] provides services at a reduced cost to those who are determined to be eligible through [facility]’s financial assistance program. Information regarding [facility]’s financial assistance program can be found by going to [email or web address] or by calling [phone].

## 2025-01-10 NOTE — PROGRESS NOTE ADULT - SUBJECTIVE AND OBJECTIVE BOX
------------CHARTING IN PROGRESS-----------  Patient is a 74y old  Female who presents with a chief complaint of COPD Exacerbation (10 Facundo 2025 05:32)    HPI:  74yFemale pmhx of Epilepsy on Keppra, COPD, asthma on chronic steroids, bronchiectasis, tracheomalacia s/p tracheloplasty HTN, Hx of dissection of descending thoracic aorta, , hx of aortic aneurysm,  Type B 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  Evaluated by Dr. Antonio,  Type A dissection s/p bioAVR with Bental procedure (9/2022), severe AS s/p TAVR (9/10/2023), TIA's, DVT Afib on Eliquis, T2DM, Colon cancer, neuropathy, presents to the ED for shortness of breath. Patient reports for the past 1-2 days having increased chest pressure out of her baseline, states she has also had cough, wheezing and shortness of breath with minimal relief after using her normal regimen of inhalers and nebulizers. Patient denies any fevers since onset of symptoms, however states her legs have been on/off swelling for many years. Patient of note has had multiple episodes and hospital visits for PNA over the past 2-3 months recent discharge on 12/12/24 at [facility] treated with ertapanem premedicated with benadryl. Pt otherwise denies any other concerns or complaints at this time, no fever, headache, cp, n/v/d.    ED course  Vitals: T 98.1F, HR 54, /64, RR 18, SpO2 99% on 2L NC  Significant labs: WBC 10.25, Hgb 11.7, Hct 37.6  Na 141, K 5.1, Cr .83  Lactate 2.5  UA Trace leuk esterase  Imaging:   CT CHEST --> Limited by motion and absence of IV contrast.    Displaced intimal calcifications in the mid to distal descending thoracic   aorta (602:65) consistent with dissection.  Right upper lobe groundglass opacities versus motion artifact. No focal   consolidation. Trace bilateral pleural effusions.  1.4 cm pleural-based right lower lobe nodule.   Cardiomegaly. Aortic valve repair.   CT Angio C/A/P    IMPRESSION:  Type A aortic dissection extending from the proximal aortic arch,   innominate artery level, to the infrarenal abdominal aorta segment, above   the bifurcation. There are no complications of vessel occlusion or and   organ damage. Read above text for additional findings and detailed   explanations.      CXR --> interstitial chf  EKG: RBBB  In ED patient given: doxy 100 1x, duoneb x2, solumedrol x1, benadryl x1, 1L NS x1  ED MD d/w CT Sx at [facility] about CT Angio C/A/P abnormal  results- no surgical intervention , medical management    (08 Jan 2025 14:39)    INTERVAL HPI:  ________  TODAY- Pt was seen and examined at bedside. Pt states that ___. Pt denies headache, dizziness, lightheadedness, fever, chills, body aches, CP, SOB, palpitations, abdominal pain, n/v, numbness/tingling. No other complaints at this time.     OVERNIGHT EVENTS: No acute overnight events.     Home Medications:  Breo Ellipta 200 mcg-25 mcg/inh inhalation powder: 1 puff(s) inhaled once a day (08 Jan 2025 18:59)  budesonide 1 mg/2 mL inhalation suspension: 2 milliliter(s) by nebulizer once a day (08 Jan 2025 18:59)  clopidogrel 75 mg oral tablet: 1 tab(s) orally once a day (08 Jan 2025 18:59)  DuoNeb 0.5 mg-2.5 mg/3 mL inhalation solution: 3 milliliter(s) by nebulizer once a day (08 Jan 2025 18:59)  Eliquis 5 mg oral tablet: 1 tab(s) orally 2 times a day (08 Jan 2025 18:59)  ferrous sulfate: 325 milligram(s) orally once a day (08 Jan 2025 18:59)  gabapentin 100 mg oral capsule: 200 milligram(s) orally 3 times a day (08 Jan 2025 18:59)  glycopyrrolate 1 mg oral tablet: 1 tab(s) orally 3 times a day (08 Jan 2025 18:59)  HumaLOG 100 units/mL injectable solution: injectable sliding scale &lt;100 3u, 101-140 5u, 141-200 7u (08 Jan 2025 18:59)  Keppra 500 mg oral tablet: 2 tab(s) orally 2 times a day (08 Jan 2025 18:59)  labetalol 100 mg oral tablet: 1 tab(s) orally every 8 hours (08 Jan 2025 18:59)  Lantus 100 units/mL subcutaneous solution: 18 unit(s) subcutaneous once a day (08 Jan 2025 18:59)  Lantus 100 units/mL subcutaneous solution: 30 unit(s) subcutaneous once a day (at bedtime) (08 Jan 2025 18:59)  megestrol 20 mg oral tablet: 1 tab(s) orally once a day (08 Jan 2025 18:59)  methylPREDNISolone 8 mg oral tablet: 1 tab(s) orally 2 times a day (08 Jan 2025 18:59)  mexiletine 200 mg oral capsule: 1 cap(s) orally 3 times a day (08 Jan 2025 18:59)  NIFEdipine 30 mg oral tablet, extended release: 1 tab(s) orally once a day (08 Jan 2025 18:59)  pantoprazole 40 mg oral delayed release tablet: 1 tab(s) orally once a day (08 Jan 2025 18:59)  Perforomist 20 mcg/2 mL inhalation solution: 2 milliliter(s) inhaled once a day (08 Jan 2025 18:59)  rosuvastatin 5 mg oral tablet: 1 tab(s) orally once a day (at bedtime) (08 Jan 2025 18:59)  sertraline 50 mg oral tablet: 1 tab(s) orally once a day (10 Facundo 2025 11:48)  Tezspire Pre-filled Pen 210 mg/1.91 mL subcutaneous solution: 210 milligram(s) subcutaneously (08 Jan 2025 18:59)      MEDICATIONS  (STANDING):  albuterol/ipratropium for Nebulization. 3 milliLiter(s) Nebulizer once  apixaban 5 milliGRAM(s) Oral every 12 hours  azithromycin   Tablet 250 milliGRAM(s) Oral daily  dextrose 5%. 1000 milliLiter(s) (50 mL/Hr) IV Continuous <Continuous>  dextrose 5%. 1000 milliLiter(s) (100 mL/Hr) IV Continuous <Continuous>  dextrose 50% Injectable 25 Gram(s) IV Push once  dextrose 50% Injectable 12.5 Gram(s) IV Push once  dextrose 50% Injectable 25 Gram(s) IV Push once  fluticasone propionate/ salmeterol 250-50 MICROgram(s) Diskus 1 Dose(s) Inhalation two times a day  gabapentin 200 milliGRAM(s) Oral three times a day  glucagon  Injectable 1 milliGRAM(s) IntraMuscular once  influenza  Vaccine (HIGH DOSE) 0.5 milliLiter(s) IntraMuscular once  insulin glargine Injectable (LANTUS) 10 Unit(s) SubCutaneous every morning  insulin glargine Injectable (LANTUS) 25 Unit(s) SubCutaneous at bedtime  insulin lispro (ADMELOG) corrective regimen sliding scale   SubCutaneous three times a day before meals  insulin lispro Injectable (ADMELOG) 2 Unit(s) SubCutaneous three times a day before meals  labetalol 100 milliGRAM(s) Oral every 8 hours  levETIRAcetam 1000 milliGRAM(s) Oral two times a day  megestrol 20 milliGRAM(s) Oral daily  mexiletine 200 milliGRAM(s) Oral three times a day  mineral oil 10 milliLiter(s) Oral two times a day  NIFEdipine XL 30 milliGRAM(s) Oral daily  polyethylene glycol 3350 17 Gram(s) Oral two times a day  predniSONE   Tablet 20 milliGRAM(s) Oral daily  rosuvastatin 5 milliGRAM(s) Oral at bedtime  sertraline 50 milliGRAM(s) Oral daily    MEDICATIONS  (PRN):  albuterol    90 MICROgram(s) HFA Inhaler 2 Puff(s) Inhalation every 6 hours PRN Shortness of Breath and/or Wheezing  dextrose Oral Gel 15 Gram(s) Oral once PRN Blood Glucose LESS THAN 70 milliGRAM(s)/deciliter      aspirin (Unknown)  ampicillin (Unknown)  Valium (Unknown)  codeine (Unknown)  cefepime (Short breath (Severe))  Percocet (Unknown)  penicillin (Unknown)  Avelox (Unknown)      Social History:  Lives at home with daughter, requires assistance to ambulate with walker  No alcohol use, never smoker  Not working (08 Jan 2025 14:39)      REVIEW OF SYSTEMS:  CONSTITUTIONAL: No fever, No chills, No fatigue, No myalgia, No Body ache, No Weakness  EYES: No eye pain,  No visual disturbances, No discharge, No Redness  ENMT: No ear pain, No nose bleed, No vertigo; No sinus pain, No throat pain, No Congestion  NECK: No pain, No stiffness  RESPIRATORY: No cough, No wheezing, No hemoptysis, No shortness of breath  CARDIOVASCULAR: No chest pain, No palpitations  GASTROINTESTINAL: No abdominal pain, No epigastric pain. No nausea, No vomiting, No diarrhea, No constipation; [ x ] BM-  GENITOURINARY: No dysuria, No frequency, No urgency, No hematuria, No incontinence  NEUROLOGICAL: No headaches, No dizziness, No numbness, No tingling, No tremors, No weakness  EXTREMITIES: No Swelling, No Pain, No Edema  SKIN: [ x ] No itching, burning, rashes, or lesions   MUSCULOSKELETAL: No joint pain, No joint swelling; No muscle pain, No back pain, No extremity pain  PSYCHIATRIC: No depression, No anxiety, No mood swings, No difficulty sleeping at night  PAIN SCALE: [  ] None  [  ] Other-  ROS Unable to obtain due to: [  ] Dementia  [  ] Lethargy  [  ] Sedated  [  ] Non verbal  REST OF REVIEW OF SYSTEMS: [ x ] Normal     Vital Signs Last 24 Hrs  T(C): 36.7 (10 Facundo 2025 04:31), Max: 36.9 (09 Jan 2025 20:30)  T(F): 98.1 (10 Facundo 2025 04:31), Max: 98.4 (09 Jan 2025 20:30)  HR: 72 (10 Facundo 2025 04:31) (67 - 72)  BP: 128/76 (10 Facundo 2025 04:31) (128/76 - 145/79)  BP(mean): --  RR: 18 (10 Facundo 2025 04:31) (18 - 18)  SpO2: 97% (10 Facundo 2025 04:31) (97% - 98%)    Parameters below as of 10 Facundo 2025 04:31  Patient On (Oxygen Delivery Method): room air        CAPILLARY BLOOD GLUCOSE      POCT Blood Glucose.: 204 mg/dL (10 Facundo 2025 08:19)  POCT Blood Glucose.: 222 mg/dL (10 Facundo 2025 00:15)  POCT Blood Glucose.: 290 mg/dL (09 Jan 2025 21:42)  POCT Blood Glucose.: 321 mg/dL (09 Jan 2025 17:20)  POCT Blood Glucose.: 316 mg/dL (09 Jan 2025 12:19)      I&O's Summary    09 Jan 2025 07:01  -  10 Facundo 2025 07:00  --------------------------------------------------------  IN: 600 mL / OUT: 700 mL / NET: -100 mL      PHYSICAL EXAM:  GENERAL:  [ x ] NAD, [ x ] Well appearing, [  ] Agitated, [  ] Drowsy, [  ] Lethargy, [  ] Confused   HEAD:  [ x ] Normal, [  ] Other  EYES:  [ x ] EOMI, [ x ] PERRLA, [ x ] Conjunctiva and sclera clear normal, [  ] Other, [  ] Pallor, [  ] Discharge  ENMT:  [ x ] Normal, [ x ] Moist mucous membranes, [  ] Good dentition, [  ] No thrush  NECK:  [ x ] Supple, [  ] No JVD, [ x ] Normal thyroid, [  ] Lymphadenopathy, [  ] Other  CHEST/LUNG:  [ x ] Clear to auscultation bilaterally, [ x ] Breath Sounds equal B/L / decreased, [  ] Poor effort, [ x ] No rales, [ x ] No rhonchi, [ x ] No wheezing  HEART:  [ x ] Regular rate and rhythm, [  ] Tachycardia, [  ] Bradycardia, [  ] Irregular, [ x ] No murmurs, No rubs, No gallops, [  ] PPM in place (Mfr:  )  ABDOMEN:  [ x ] Soft, [ x ] Nontender, [ x ] Nondistended, [ x ] No mass, [ x ] Bowel sounds present, [  ] Obese  NERVOUS SYSTEM:  [ x ] Alert & Oriented x3__, [ x ] Nonfocal, [  ] Confusion, [  ] Encephalopathic, [  ] Sedated, [  ] Unable to assess, [  ] Dementia, [  ] Other-  EXTREMITIES:  [ x ] 2+ Peripheral Pulses, No clubbing, No cyanosis,  [  ] Edema B/L lower EXT, [  ] PVD stasis skin changes B/L lower EXT, [  ] Wound  LYMPH:  No lymphadenopathy noted  SKIN:  [ x ] No rashes or lesions, [  ] Pressure ulcers, [  ] Ecchymosis, [  ] Skin tears, [  ] Other    DIET: Diet, DASH/TLC:   Sodium & Cholesterol Restricted (01-08-25 @ 14:26)      LABS:                        11.5   10.79 )-----------( 304      ( 10 Facundo 2025 08:53 )             36.6     10 Facundo 2025 08:53    142    |  110    |  18     ----------------------------<  222    4.1     |  25     |  0.52     Ca    8.8        10 Facundo 2025 08:53    TPro  6.4    /  Alb  3.2    /  TBili  0.3    /  DBili  x      /  AST  11     /  ALT  18     /  AlkPhos  71     10 Facundo 2025 08:53      Urinalysis Basic - ( 10 Facundo 2025 08:53 )    Color: x / Appearance: x / SG: x / pH: x  Gluc: 222 mg/dL / Ketone: x  / Bili: x / Urobili: x   Blood: x / Protein: x / Nitrite: x   Leuk Esterase: x / RBC: x / WBC x   Sq Epi: x / Non Sq Epi: x / Bacteria: x      Culture Results:   No growth at 24 hours (01-08 @ 11:50)  Culture Results:   No growth at 24 hours (01-08 @ 11:45)            Urinalysis with Rflx Culture (collected 08 Jan 2025 13:48)    Culture - Blood (collected 08 Jan 2025 11:50)  Source: .Blood BLOOD  Preliminary Report (09 Jan 2025 16:01):    No growth at 24 hours    Culture - Blood (collected 08 Jan 2025 11:45)  Source: .Blood BLOOD  Preliminary Report (09 Jan 2025 16:01):    No growth at 24 hours       Anemia Panel:      Thyroid Panel:                RADIOLOGY & ADDITIONAL TESTS: _______      HEALTH ISSUES - PROBLEM Dx:  COPD exacerbation    Asthma    Epilepsy    HTN (hypertension)    T2DM (type 2 diabetes mellitus)    Chronic atrial fibrillation    Neuropathy    Need for prophylactic measure    History of TIAs    Type 2 diabetes mellitus with hyperglycemia    History of aortic aneurysm          Consultant(s) Notes Reviewed:  [ x ] YES     Care Discussed with [ x ] Consultants, [ x ] Patient, [ x ] Family, [  ] HCP, [ x ] , [  ] Social Service, [ x ] RN, [  ] Physical Therapy, [  ] Palliative Care Team  DVT PPX: [  ] Lovenox, [  ] SC Heparin, [  ] Coumadin, [  ] Xarelto, [  ] Eliquis, [  ] Pradaxa, [  ] IV Heparin drip, [  ] SCD, [  ] Ambulation, [  ] Contraindicated 2/2 GI Bleed, [  ] Contraindicated 2/2  Bleed, [  ] Contraindicated 2/2 Brain Bleed  Advanced Directive: [  ] None, [  ] DNR/DNI Patient is a 74y old  Female who presents with a chief complaint of COPD Exacerbation (10 Facundo 2025 05:32)    HPI:  74yFemale pmhx of Epilepsy on Keppra, COPD, asthma on chronic steroids, bronchiectasis, tracheomalacia s/p tracheloplasty HTN, Hx of dissection of descending thoracic aorta, , hx of aortic aneurysm,  Type B 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  Evaluated by Dr. Antonio,  Type A dissection s/p bioAVR with Bental procedure (9/2022), severe AS s/p TAVR (9/10/2023), TIA's, DVT Afib on Eliquis, T2DM, Colon cancer, neuropathy, presents to the ED for shortness of breath. Patient reports for the past 1-2 days having increased chest pressure out of her baseline, states she has also had cough, wheezing and shortness of breath with minimal relief after using her normal regimen of inhalers and nebulizers. Patient denies any fevers since onset of symptoms, however states her legs have been on/off swelling for many years. Patient of note has had multiple episodes and hospital visits for PNA over the past 2-3 months recent discharge on 12/12/24 at [facility] treated with ertapanem premedicated with benadryl. Pt otherwise denies any other concerns or complaints at this time, no fever, headache, cp, n/v/d.    ED course  Vitals: T 98.1F, HR 54, /64, RR 18, SpO2 99% on 2L NC  Significant labs: WBC 10.25, Hgb 11.7, Hct 37.6  Na 141, K 5.1, Cr .83  Lactate 2.5  UA Trace leuk esterase  Imaging:   CT CHEST --> Limited by motion and absence of IV contrast.    Displaced intimal calcifications in the mid to distal descending thoracic   aorta (602:65) consistent with dissection.  Right upper lobe groundglass opacities versus motion artifact. No focal   consolidation. Trace bilateral pleural effusions.  1.4 cm pleural-based right lower lobe nodule.   Cardiomegaly. Aortic valve repair.   CT Angio C/A/P    IMPRESSION:  Type A aortic dissection extending from the proximal aortic arch,   innominate artery level, to the infrarenal abdominal aorta segment, above   the bifurcation. There are no complications of vessel occlusion or and   organ damage. Read above text for additional findings and detailed   explanations.      CXR --> interstitial chf  EKG: RBBB  In ED patient given: doxy 100 1x, duoneb x2, solumedrol x1, benadryl x1, 1L NS x1  ED MD d/w CT Sx at [facility] about CT Angio C/A/P abnormal  results- no surgical intervention , medical management    (08 Jan 2025 14:39)    INTERVAL HPI:  1/9/25: Pt was seen and examined at bedside. Pt states she has a cough with yellow-green sputum. Was previously admitted at [place] for COPD exacerbation and treated with "ertapenem and benadryl" due to her long list of allergies. Takes both miralax and mineral oil to avoid constipation. Reports that she has shortness of breath with minimal exertion. On PO Zithromax  1/10- Pt was seen and examined at bedside. Pt states that she is feeling better today. Patient is not on home O2 and does not wish to ever be on it. On PO Zithromax. DC planning.     OVERNIGHT EVENTS: No acute overnight events.     Home Medications:  Breo Ellipta 200 mcg-25 mcg/inh inhalation powder: 1 puff(s) inhaled once a day (08 Jan 2025 18:59)  budesonide 1 mg/2 mL inhalation suspension: 2 milliliter(s) by nebulizer once a day (08 Jan 2025 18:59)  clopidogrel 75 mg oral tablet: 1 tab(s) orally once a day (08 Jan 2025 18:59)  DuoNeb 0.5 mg-2.5 mg/3 mL inhalation solution: 3 milliliter(s) by nebulizer once a day (08 Jan 2025 18:59)  Eliquis 5 mg oral tablet: 1 tab(s) orally 2 times a day (08 Jan 2025 18:59)  ferrous sulfate: 325 milligram(s) orally once a day (08 Jan 2025 18:59)  gabapentin 100 mg oral capsule: 200 milligram(s) orally 3 times a day (08 Jan 2025 18:59)  glycopyrrolate 1 mg oral tablet: 1 tab(s) orally 3 times a day (08 Jan 2025 18:59)  HumaLOG 100 units/mL injectable solution: injectable sliding scale &lt;100 3u, 101-140 5u, 141-200 7u (08 Jan 2025 18:59)  Keppra 500 mg oral tablet: 2 tab(s) orally 2 times a day (08 Jan 2025 18:59)  labetalol 100 mg oral tablet: 1 tab(s) orally every 8 hours (08 Jan 2025 18:59)  Lantus 100 units/mL subcutaneous solution: 18 unit(s) subcutaneous once a day (08 Jan 2025 18:59)  Lantus 100 units/mL subcutaneous solution: 30 unit(s) subcutaneous once a day (at bedtime) (08 Jan 2025 18:59)  megestrol 20 mg oral tablet: 1 tab(s) orally once a day (08 Jan 2025 18:59)  methylPREDNISolone 8 mg oral tablet: 1 tab(s) orally 2 times a day (08 Jan 2025 18:59)  mexiletine 200 mg oral capsule: 1 cap(s) orally 3 times a day (08 Jan 2025 18:59)  NIFEdipine 30 mg oral tablet, extended release: 1 tab(s) orally once a day (08 Jan 2025 18:59)  pantoprazole 40 mg oral delayed release tablet: 1 tab(s) orally once a day (08 Jan 2025 18:59)  Perforomist 20 mcg/2 mL inhalation solution: 2 milliliter(s) inhaled once a day (08 Jan 2025 18:59)  rosuvastatin 5 mg oral tablet: 1 tab(s) orally once a day (at bedtime) (08 Jan 2025 18:59)  sertraline 50 mg oral tablet: 1 tab(s) orally once a day (10 Facundo 2025 11:48)  Tezspire Pre-filled Pen 210 mg/1.91 mL subcutaneous solution: 210 milligram(s) subcutaneously (08 Jan 2025 18:59)      MEDICATIONS  (STANDING):  albuterol/ipratropium for Nebulization. 3 milliLiter(s) Nebulizer once  apixaban 5 milliGRAM(s) Oral every 12 hours  azithromycin   Tablet 250 milliGRAM(s) Oral daily  dextrose 5%. 1000 milliLiter(s) (50 mL/Hr) IV Continuous <Continuous>  dextrose 5%. 1000 milliLiter(s) (100 mL/Hr) IV Continuous <Continuous>  dextrose 50% Injectable 25 Gram(s) IV Push once  dextrose 50% Injectable 12.5 Gram(s) IV Push once  dextrose 50% Injectable 25 Gram(s) IV Push once  fluticasone propionate/ salmeterol 250-50 MICROgram(s) Diskus 1 Dose(s) Inhalation two times a day  gabapentin 200 milliGRAM(s) Oral three times a day  glucagon  Injectable 1 milliGRAM(s) IntraMuscular once  influenza  Vaccine (HIGH DOSE) 0.5 milliLiter(s) IntraMuscular once  insulin glargine Injectable (LANTUS) 10 Unit(s) SubCutaneous every morning  insulin glargine Injectable (LANTUS) 25 Unit(s) SubCutaneous at bedtime  insulin lispro (ADMELOG) corrective regimen sliding scale   SubCutaneous three times a day before meals  insulin lispro Injectable (ADMELOG) 2 Unit(s) SubCutaneous three times a day before meals  labetalol 100 milliGRAM(s) Oral every 8 hours  levETIRAcetam 1000 milliGRAM(s) Oral two times a day  megestrol 20 milliGRAM(s) Oral daily  mexiletine 200 milliGRAM(s) Oral three times a day  mineral oil 10 milliLiter(s) Oral two times a day  NIFEdipine XL 30 milliGRAM(s) Oral daily  polyethylene glycol 3350 17 Gram(s) Oral two times a day  predniSONE   Tablet 20 milliGRAM(s) Oral daily  rosuvastatin 5 milliGRAM(s) Oral at bedtime  sertraline 50 milliGRAM(s) Oral daily    MEDICATIONS  (PRN):  albuterol    90 MICROgram(s) HFA Inhaler 2 Puff(s) Inhalation every 6 hours PRN Shortness of Breath and/or Wheezing  dextrose Oral Gel 15 Gram(s) Oral once PRN Blood Glucose LESS THAN 70 milliGRAM(s)/deciliter      aspirin (Unknown)  ampicillin (Unknown)  Valium (Unknown)  codeine (Unknown)  cefepime (Short breath (Severe))  Percocet (Unknown)  penicillin (Unknown)  Avelox (Unknown)      Social History:  Lives at home with daughter, requires assistance to ambulate with walker  No alcohol use, never smoker  Not working (08 Jan 2025 14:39)      REVIEW OF SYSTEMS:  CONSTITUTIONAL: No fever, No chills, No fatigue, No myalgia, No Body ache, No Weakness  EYES: No eye pain,  No visual disturbances, No discharge, No Redness  ENMT: No ear pain, No nose bleed, No vertigo; No sinus pain, No throat pain, No Congestion  NECK: No pain, No stiffness  RESPIRATORY: +improving cough, No hemoptysis, + mild shortness of breath, no wheezing  CARDIOVASCULAR: No chest pain, No palpitations  GASTROINTESTINAL: No abdominal pain, No epigastric pain. No nausea, No vomiting, No diarrhea, No constipation; [ x ] BM-  GENITOURINARY: No dysuria, No frequency, No urgency, No hematuria, No incontinence  NEUROLOGICAL: No headaches, No dizziness, No numbness, No tingling, No tremors, No weakness  EXTREMITIES: No Swelling, No Pain, No Edema  SKIN: [ x ] No itching, burning, rashes, or lesions   MUSCULOSKELETAL: No joint pain, No joint swelling; No muscle pain, No back pain, No extremity pain  PSYCHIATRIC: No depression, No anxiety, No mood swings, No difficulty sleeping at night  PAIN SCALE: [ x ] None  [  ] Other-  ROS Unable to obtain due to: [  ] Dementia  [  ] Lethargy  [  ] Sedated  [  ] Non verbal  REST OF REVIEW OF SYSTEMS: [ x ] Normal     Vital Signs Last 24 Hrs  T(C): 36.7 (10 Facundo 2025 04:31), Max: 36.9 (09 Jan 2025 20:30)  T(F): 98.1 (10 Facundo 2025 04:31), Max: 98.4 (09 Jan 2025 20:30)  HR: 72 (10 Facundo 2025 04:31) (67 - 72)  BP: 128/76 (10 Facundo 2025 04:31) (128/76 - 145/79)  BP(mean): --  RR: 18 (10 Facundo 2025 04:31) (18 - 18)  SpO2: 97% (10 Facundo 2025 04:31) (97% - 98%)    Parameters below as of 10 Facundo 2025 04:31  Patient On (Oxygen Delivery Method): room air        CAPILLARY BLOOD GLUCOSE      POCT Blood Glucose.: 204 mg/dL (10 Facundo 2025 08:19)  POCT Blood Glucose.: 222 mg/dL (10 Facundo 2025 00:15)  POCT Blood Glucose.: 290 mg/dL (09 Jan 2025 21:42)  POCT Blood Glucose.: 321 mg/dL (09 Jan 2025 17:20)  POCT Blood Glucose.: 316 mg/dL (09 Jan 2025 12:19)      I&O's Summary    09 Jan 2025 07:01  -  10 Facundo 2025 07:00  --------------------------------------------------------  IN: 600 mL / OUT: 700 mL / NET: -100 mL      PHYSICAL EXAM:  GENERAL:  [ x ] NAD, [ x ] Well appearing, [  ] Agitated, [  ] Drowsy, [  ] Lethargy, [  ] Confused   HEAD:  [ x ] Normal, [  ] Other  EYES:  [ x ] EOMI, [ x ] PERRLA, [ x ] Conjunctiva and sclera clear normal, [  ] Other, [  ] Pallor, [  ] Discharge  ENMT:  [ x ] Normal, [ x ] Moist mucous membranes, [  ] Good dentition, [  ] No thrush  NECK:  [ x ] Supple, [  ] No JVD, [ x ] Normal thyroid, [  ] Lymphadenopathy, [  ] Other  CHEST/LUNG:  [  ] Clear to auscultation bilaterally, [ x ] Breath Sounds decreased, [x  ] Poor effort, [ x ] No rales, [ x ] bilateral rhonchi, [ x ] No wheezing  HEART:  [ x ] Regular rate and rhythm, [  ] Tachycardia, [  ] Bradycardia, [  ] Irregular, [ x ] No murmurs, No rubs, No gallops, [  ] PPM in place (Mfr:  )  ABDOMEN:  [ x ] Soft, [ x ] Nontender, [ x ] Nondistended, [ x ] No mass, [ x ] Bowel sounds present, [  ] Obese  NERVOUS SYSTEM:  [ x ] Alert & Oriented x3__, [ x ] Nonfocal, [  ] Confusion, [  ] Encephalopathic, [  ] Sedated, [  ] Unable to assess, [  ] Dementia, [  ] Other-  EXTREMITIES:  [ x ] 2+ Peripheral Pulses, No clubbing, No cyanosis,  [  ] Edema B/L lower EXT, [ x ] PVD stasis skin changes B/L lower EXT, [  ] Wound [ x ] minimal tenderness to palpation of R lateral foot  LYMPH:  No lymphadenopathy noted  SKIN:  [ x ] No rashes or lesions, [  ] Pressure ulcers, [ x ] Ecchymosis,- upper ext [  ] Skin tears, [  ] Other      DIET: Diet, DASH/TLC:   Sodium & Cholesterol Restricted (01-08-25 @ 14:26)      LABS:                        11.5   10.79 )-----------( 304      ( 10 Facundo 2025 08:53 )             36.6     10 Facundo 2025 08:53    142    |  110    |  18     ----------------------------<  222    4.1     |  25     |  0.52     Ca    8.8        10 Facundo 2025 08:53    TPro  6.4    /  Alb  3.2    /  TBili  0.3    /  DBili  x      /  AST  11     /  ALT  18     /  AlkPhos  71     10 Facundo 2025 08:53      Urinalysis Basic - ( 10 Facundo 2025 08:53 )    Color: x / Appearance: x / SG: x / pH: x  Gluc: 222 mg/dL / Ketone: x  / Bili: x / Urobili: x   Blood: x / Protein: x / Nitrite: x   Leuk Esterase: x / RBC: x / WBC x   Sq Epi: x / Non Sq Epi: x / Bacteria: x      Culture Results:   No growth at 24 hours (01-08 @ 11:50)  Culture Results:   No growth at 24 hours (01-08 @ 11:45)            Urinalysis with Rflx Culture (collected 08 Jan 2025 13:48)    Culture - Blood (collected 08 Jan 2025 11:50)  Source: .Blood BLOOD  Preliminary Report (09 Jan 2025 16:01):    No growth at 24 hours    Culture - Blood (collected 08 Jan 2025 11:45)  Source: .Blood BLOOD  Preliminary Report (09 Jan 2025 16:01):    No growth at 24 hours       Anemia Panel:      Thyroid Panel:                RADIOLOGY & ADDITIONAL TESTS: _______      HEALTH ISSUES - PROBLEM Dx:  COPD exacerbation    Asthma    Epilepsy    HTN (hypertension)    T2DM (type 2 diabetes mellitus)    Chronic atrial fibrillation    Neuropathy    Need for prophylactic measure    History of TIAs    Type 2 diabetes mellitus with hyperglycemia    History of aortic aneurysm          Consultant(s) Notes Reviewed:  [ x ] YES     Care Discussed with [ x ] Consultants, [ x ] Patient, [ x ] Family, [  ] HCP, [ x ] , [  ] Social Service, [ x ] RN, [  ] Physical Therapy, [  ] Palliative Care Team  DVT PPX: [  ] Lovenox, [  ] SC Heparin, [  ] Coumadin, [  ] Xarelto, [ x ] Eliquis, [  ] Pradaxa, [  ] IV Heparin drip, [  ] SCD, [  ] Ambulation, [  ] Contraindicated 2/2 GI Bleed, [  ] Contraindicated 2/2  Bleed, [  ] Contraindicated 2/2 Brain Bleed  Advanced Directive: [ x ] None, [ x ] DNR/DNI Patient is a 74y old  Female who presents with a chief complaint of COPD Exacerbation (10 Facundo 2025 05:32)    HPI:  74yFemale pmhx of Epilepsy on Keppra, COPD, asthma on chronic steroids, DM 2  bronchiectasis, tracheomalacia s/p tracheloplasty HTN, Hx of dissection of descending thoracic aorta, TIA  hx of aortic aneurysm,  Type B 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  Evaluated by Dr. Antonio,  Type A dissection s/p bioAVR with Bental procedure (9/2022), severe AS s/p TAVR (9/10/2023), TIA's, DVT Afib on Eliquis, T2DM, Colon cancer, Colostomy  neuropathy, presents to the ED for shortness of breath. Patient reports for the past 1-2 days having increased chest pressure out of her baseline, states she has also had cough, wheezing and shortness of breath with minimal relief after using her normal regimen of inhalers and nebulizers. Patient denies any fevers since onset of symptoms, however states her legs have been on/off swelling for many years. Patient of note has had multiple episodes and hospital visits for PNA over the past 2-3 months recent discharge on 12/12/24 at [facility] treated with ertapanem premedicated with benadryl. Pt otherwise denies any other concerns or complaints at this time, no fever, headache, cp, n/v/d.    ED course  Vitals: T 98.1F, HR 54, /64, RR 18, SpO2 99% on 2L NC  Significant labs: WBC 10.25, Hgb 11.7, Hct 37.6  Na 141, K 5.1, Cr .83  Lactate 2.5  UA Trace leuk esterase  Imaging:   CT CHEST --> Limited by motion and absence of IV contrast.    Displaced intimal calcifications in the mid to distal descending thoracic   aorta (602:65) consistent with dissection.  Right upper lobe groundglass opacities versus motion artifact. No focal   consolidation. Trace bilateral pleural effusions.  1.4 cm pleural-based right lower lobe nodule.   Cardiomegaly. Aortic valve repair.   CT Angio C/A/P    IMPRESSION:  Type A aortic dissection extending from the proximal aortic arch,   innominate artery level, to the infrarenal abdominal aorta segment, above   the bifurcation. There are no complications of vessel occlusion or and   organ damage. Read above text for additional findings and detailed   explanations.      CXR --> interstitial chf  EKG: RBBB  In ED patient given: doxy 100 1x, duoneb x2, solumedrol x1, benadryl x1, 1L NS x1  ED MD d/w CT Sx at [facility] about CT Angio C/A/P abnormal  results- no surgical intervention , medical management    (08 Jan 2025 14:39)    INTERVAL HPI:  1/9/25: Pt was seen and examined at bedside. Pt states she has a cough with yellow-green sputum. Was previously admitted at [place] for COPD exacerbation and treated with "ertapenem and benadryl" due to her long list of allergies. Takes both miralax and mineral oil to avoid constipation. Reports that she has shortness of breath with minimal exertion. On PO Zithromax  1/10- Pt was seen and examined at bedside. Pt states that she is feeling better today. Patient is not on home O2 and does not wish to ever be on it. On PO Zithromax. DC planning. on RA Satting well    OVERNIGHT EVENTS: No acute overnight events.     Home Medications:  Breo Ellipta 200 mcg-25 mcg/inh inhalation powder: 1 puff(s) inhaled once a day (08 Jan 2025 18:59)  budesonide 1 mg/2 mL inhalation suspension: 2 milliliter(s) by nebulizer once a day (08 Jan 2025 18:59)  clopidogrel 75 mg oral tablet: 1 tab(s) orally once a day (08 Jan 2025 18:59)  DuoNeb 0.5 mg-2.5 mg/3 mL inhalation solution: 3 milliliter(s) by nebulizer once a day (08 Jan 2025 18:59)  Eliquis 5 mg oral tablet: 1 tab(s) orally 2 times a day (08 Jan 2025 18:59)  ferrous sulfate: 325 milligram(s) orally once a day (08 Jan 2025 18:59)  gabapentin 100 mg oral capsule: 200 milligram(s) orally 3 times a day (08 Jan 2025 18:59)  glycopyrrolate 1 mg oral tablet: 1 tab(s) orally 3 times a day (08 Jan 2025 18:59)  HumaLOG 100 units/mL injectable solution: injectable sliding scale &lt;100 3u, 101-140 5u, 141-200 7u (08 Jan 2025 18:59)  Keppra 500 mg oral tablet: 2 tab(s) orally 2 times a day (08 Jan 2025 18:59)  labetalol 100 mg oral tablet: 1 tab(s) orally every 8 hours (08 Jan 2025 18:59)  Lantus 100 units/mL subcutaneous solution: 18 unit(s) subcutaneous once a day (08 Jan 2025 18:59)  Lantus 100 units/mL subcutaneous solution: 30 unit(s) subcutaneous once a day (at bedtime) (08 Jan 2025 18:59)  megestrol 20 mg oral tablet: 1 tab(s) orally once a day (08 Jan 2025 18:59)  methylPREDNISolone 8 mg oral tablet: 1 tab(s) orally 2 times a day (08 Jan 2025 18:59)  mexiletine 200 mg oral capsule: 1 cap(s) orally 3 times a day (08 Jan 2025 18:59)  NIFEdipine 30 mg oral tablet, extended release: 1 tab(s) orally once a day (08 Jan 2025 18:59)  pantoprazole 40 mg oral delayed release tablet: 1 tab(s) orally once a day (08 Jan 2025 18:59)  Perforomist 20 mcg/2 mL inhalation solution: 2 milliliter(s) inhaled once a day (08 Jan 2025 18:59)  rosuvastatin 5 mg oral tablet: 1 tab(s) orally once a day (at bedtime) (08 Jan 2025 18:59)  sertraline 50 mg oral tablet: 1 tab(s) orally once a day (10 Facundo 2025 11:48)  [place] Pre-filled Pen 210 mg/1.91 mL subcutaneous solution: 210 milligram(s) subcutaneously (08 Jan 2025 18:59)      MEDICATIONS  (STANDING):  albuterol/ipratropium for Nebulization. 3 milliLiter(s) Nebulizer once  apixaban 5 milliGRAM(s) Oral every 12 hours  azithromycin   Tablet 250 milliGRAM(s) Oral daily  dextrose 5%. 1000 milliLiter(s) (50 mL/Hr) IV Continuous <Continuous>  dextrose 5%. 1000 milliLiter(s) (100 mL/Hr) IV Continuous <Continuous>  dextrose 50% Injectable 25 Gram(s) IV Push once  dextrose 50% Injectable 12.5 Gram(s) IV Push once  dextrose 50% Injectable 25 Gram(s) IV Push once  fluticasone propionate/ salmeterol 250-50 MICROgram(s) Diskus 1 Dose(s) Inhalation two times a day  gabapentin 200 milliGRAM(s) Oral three times a day  glucagon  Injectable 1 milliGRAM(s) IntraMuscular once  influenza  Vaccine (HIGH DOSE) 0.5 milliLiter(s) IntraMuscular once  insulin glargine Injectable (LANTUS) 10 Unit(s) SubCutaneous every morning  insulin glargine Injectable (LANTUS) 25 Unit(s) SubCutaneous at bedtime  insulin lispro (ADMELOG) corrective regimen sliding scale   SubCutaneous three times a day before meals  insulin lispro Injectable (ADMELOG) 2 Unit(s) SubCutaneous three times a day before meals  labetalol 100 milliGRAM(s) Oral every 8 hours  levETIRAcetam 1000 milliGRAM(s) Oral two times a day  megestrol 20 milliGRAM(s) Oral daily  mexiletine 200 milliGRAM(s) Oral three times a day  mineral oil 10 milliLiter(s) Oral two times a day  NIFEdipine XL 30 milliGRAM(s) Oral daily  polyethylene glycol 3350 17 Gram(s) Oral two times a day  predniSONE   Tablet 20 milliGRAM(s) Oral daily  rosuvastatin 5 milliGRAM(s) Oral at bedtime  sertraline 50 milliGRAM(s) Oral daily    MEDICATIONS  (PRN):  albuterol    90 MICROgram(s) HFA Inhaler 2 Puff(s) Inhalation every 6 hours PRN Shortness of Breath and/or Wheezing  dextrose Oral Gel 15 Gram(s) Oral once PRN Blood Glucose LESS THAN 70 milliGRAM(s)/deciliter      aspirin (Unknown)  ampicillin (Unknown)  Valium (Unknown)  codeine (Unknown)  cefepime (Short breath (Severe))  Percocet (Unknown)  penicillin (Unknown)  Avelox (Unknown)      Social History:  Lives at home with daughter, requires assistance to ambulate with walker  No alcohol use, never smoker  Not working (08 Jan 2025 14:39)      REVIEW OF SYSTEMS:  CONSTITUTIONAL: No fever, No chills, No fatigue, No myalgia, No Body ache, No Weakness  EYES: No eye pain,  No visual disturbances, No discharge, No Redness  ENMT: No ear pain, No nose bleed, No vertigo; No sinus pain, No throat pain, No Congestion  NECK: No pain, No stiffness  RESPIRATORY: +improving cough, No hemoptysis, + mild shortness of breath, no wheezing  CARDIOVASCULAR: No chest pain, No palpitations  GASTROINTESTINAL: No abdominal pain, No epigastric pain. No nausea, No vomiting, No diarrhea, No constipation; [ x ] BM-  GENITOURINARY: No dysuria, No frequency, No urgency, No hematuria, No incontinence  NEUROLOGICAL: No headaches, No dizziness, No numbness, No tingling, No tremors, No weakness  EXTREMITIES: No Swelling, No Pain, No Edema  SKIN: [ x ] No itching, burning, rashes, or lesions   MUSCULOSKELETAL: No joint pain, No joint swelling; No muscle pain, No back pain, No extremity pain  PSYCHIATRIC: No depression, No anxiety, No mood swings, No difficulty sleeping at night  PAIN SCALE: [ x ] None  [  ] Other-  ROS Unable to obtain due to: [  ] Dementia  [  ] Lethargy  [  ] Sedated  [  ] Non verbal  REST OF REVIEW OF SYSTEMS: [ x ] Normal     Vital Signs Last 24 Hrs  T(C): 36.7 (10 Facundo 2025 04:31), Max: 36.9 (09 Jan 2025 20:30)  T(F): 98.1 (10 Facundo 2025 04:31), Max: 98.4 (09 Jan 2025 20:30)  HR: 72 (10 Facundo 2025 04:31) (67 - 72)  BP: 128/76 (10 Facundo 2025 04:31) (128/76 - 145/79)  BP(mean): --  RR: 18 (10 Facundo 2025 04:31) (18 - 18)  SpO2: 97% (10 Facundo 2025 04:31) (97% - 98%)    Parameters below as of 10 Facundo 2025 04:31  Patient On (Oxygen Delivery Method): room air        CAPILLARY BLOOD GLUCOSE      POCT Blood Glucose.: 204 mg/dL (10 Facundo 2025 08:19)  POCT Blood Glucose.: 222 mg/dL (10 Facundo 2025 00:15)  POCT Blood Glucose.: 290 mg/dL (09 Jan 2025 21:42)  POCT Blood Glucose.: 321 mg/dL (09 Jan 2025 17:20)  POCT Blood Glucose.: 316 mg/dL (09 Jan 2025 12:19)      I&O's Summary    09 Jan 2025 07:01  -  10 Facundo 2025 07:00  --------------------------------------------------------  IN: 600 mL / OUT: 700 mL / NET: -100 mL      PHYSICAL EXAM:  GENERAL:  [ x ] NAD, [ x ] Well appearing, [  ] Agitated, [  ] Drowsy, [  ] Lethargy, [  ] Confused   HEAD:  [ x ] Normal, [  ] Other  EYES:  [ x ] EOMI, [ x ] PERRLA, [ x ] Conjunctiva and sclera clear normal, [  ] Other, [  ] Pallor, [  ] Discharge  ENMT:  [ x ] Normal, [ x ] Moist mucous membranes, [  ] Good dentition, [x  ] No thrush  NECK:  [ x ] Supple, [ x ] No JVD, [ x ] Normal thyroid, [  ] Lymphadenopathy, [  ] Other  CHEST/LUNG:  [  ] Clear to auscultation bilaterally, [ x ] Breath Sounds decreased, [x  ] Poor effort, [ x ] No rales, [ x ] few bilateral rhonchi, [ x ] No wheezing  HEART:  [ x ] Regular rate and rhythm, [  ] Tachycardia, [  ] Bradycardia, [  ] Irregular, [ x ] No murmurs, No rubs, No gallops, [  ] PPM in place (Mfr:  )  ABDOMEN:  [ x ] Soft, [ x ] Nontender, [ x ] Nondistended, [ x ] No mass, [ x ] Bowel sounds present, [  ] Obese + Colostomy   NERVOUS SYSTEM:  [ x ] Alert & Oriented x3__, [ x ] Nonfocal, [  ] Confusion, [  ] Encephalopathic, [  ] Sedated, [  ] Unable to assess, [  ] Dementia, [  ] Other-  EXTREMITIES:  [ x ] 2+ Peripheral Pulses, No clubbing, No cyanosis,  [  ] Edema B/L lower EXT, [ x ] PVD stasis skin changes B/L lower EXT, [  ] Wound [ x ] minimal tenderness to palpation of R lateral foot  LYMPH:  No lymphadenopathy noted  SKIN:  [ x ] No rashes or lesions, [  ] Pressure ulcers, [ x ] Ecchymosis,- upper ext [  ] Skin tears, [  ] Other      DIET: Diet, DASH/TLC:   Sodium & Cholesterol Restricted (01-08-25 @ 14:26)      LABS:                        11.5   10.79 )-----------( 304      ( 10 Facundo 2025 08:53 )             36.6     10 Facundo 2025 08:53    142    |  110    |  18     ----------------------------<  222    4.1     |  25     |  0.52     Ca    8.8        10 Facundo 2025 08:53    TPro  6.4    /  Alb  3.2    /  TBili  0.3    /  DBili  x      /  AST  11     /  ALT  18     /  AlkPhos  71     10 Facundo 2025 08:53      Urinalysis Basic - ( 10 Facundo 2025 08:53 )    Color: x / Appearance: x / SG: x / pH: x  Gluc: 222 mg/dL / Ketone: x  / Bili: x / Urobili: x   Blood: x / Protein: x / Nitrite: x   Leuk Esterase: x / RBC: x / WBC x   Sq Epi: x / Non Sq Epi: x / Bacteria: x      Culture Results:   No growth at 24 hours (01-08 @ 11:50)  Culture Results:   No growth at 24 hours (01-08 @ 11:45)    Urinalysis with Rflx Culture (collected 08 Jan 2025 13:48)    Culture - Blood (collected 08 Jan 2025 11:50)  Source: .Blood BLOOD  Preliminary Report (09 Jan 2025 16:01):    No growth at 24 hours    Culture - Blood (collected 08 Jan 2025 11:45)  Source: .Blood BLOOD  Preliminary Report (09 Jan 2025 16:01):    No growth at 24 hours    RADIOLOGY & ADDITIONAL TESTS: _______      HEALTH ISSUES - PROBLEM Dx:  COPD exacerbation    Asthma    Epilepsy    HTN (hypertension)    T2DM (type 2 diabetes mellitus)    Chronic atrial fibrillation    Neuropathy    Need for prophylactic measure    History of TIAs    Type 2 diabetes mellitus with hyperglycemia    History of aortic aneurysm          Consultant(s) Notes Reviewed:  [ x ] YES     Care Discussed with [ x ] Consultants, [ x ] Patient, [ x ] Family, [  ] HCP, [ x ] , [  ] Social Service, [ x ] RN, [  ] Physical Therapy, [  ] Palliative Care Team  DVT PPX: [  ] Lovenox, [  ] SC Heparin, [  ] Coumadin, [  ] Xarelto, [ x ] Eliquis, [  ] Pradaxa, [  ] IV Heparin drip, [  ] SCD, [  ] Ambulation, [  ] Contraindicated 2/2 GI Bleed, [  ] Contraindicated 2/2  Bleed, [  ] Contraindicated 2/2 Brain Bleed  Advanced Directive: [ x ] None, [ x ] DNR/DNI

## 2025-01-10 NOTE — SOCIAL WORK PROGRESS NOTE - NSSWPROGRESSNOTE_GEN_ALL_CORE
1/10/2025 16:26 (ET) Mushtaq Tran-SW: [employer] [facility] arrived at 3:35 pm and left by 4:00 pm without the Pt. Medicaid transport [facility] was contacted and call was dispatched to [facility] [phone] at 4:50 pm spoke with Ivett Pt made aware.

## 2025-01-10 NOTE — CASE MANAGEMENT PROGRESS NOTE - NSCMPROGRESSNOTE_GEN_ALL_CORE
Per MD, patient is medically cleared for discharge home today.  CM called patient's daughter, Zoe, to discuss [facility] [phone] and PCA services 12x7 through DSS. 517 Form to be faxed to Peg at [facility] [phone] once completed by MD for aide services to be reinstated. Discharge notice/IMM reviewed with daughter and copy given to patient. Medicaid tax to transport patient home. Daughter verbalized understanding of the transition plan and is in agreement. CM answered all questions to the best of my abilities. CM remains available throughout hospital stay.

## 2025-01-10 NOTE — PROGRESS NOTE ADULT - PROBLEM SELECTOR PLAN 1
Hx of COPD,  Ac on chronic hypoxic respiratory failure  not requiring home O2  - Patient sob wheezing  - CT chest --> B/L Trace pleural effusion   - Received 1x doxy in ED will continue  - Pulm (Dr Luis) consulted recs appreciated  -S/P IV Solumedrol continue  Pt's on Home steroids daily  RVP- neg Ac on chronic hypoxic respiratory failure  not requiring home O2 ,pt is Off O2 NC  - Patient sob wheezing  - CT chest --> B/L Trace pleural effusion   - Received 1x doxy in ED  - Start Azithromycin PO x 5 days as per Dr Peng  - lBue (Dr Luis) consulted recs appreciated  -S/P IV Solumedrol in ER   Pt's on Home steroids daily  RVP- neg  ID Dr. Peng following, Started azithromycin 500mg one dose and then 4 days of 250mg. Ac on chronic hypoxic respiratory failure  not requiring home O2 ,pt is Off O2 NC Resolved   - CT chest --> B/L Trace pleural effusion   - Received 1x doxy in ED  - Start Azithromycin PO x 5 days as per Dr Peng  - Blue (Dr Luis) consulted recs appreciated-resume home Steroids on d/c  -S/P IV Solumedrol in ER   Pt's on Home steroids daily  RVP- neg  ID Dr. Peng following, Started azithromycin 500mg one dose and then 4 days of 250mg.

## 2025-01-10 NOTE — PROGRESS NOTE ADULT - PROVIDER SPECIALTY LIST ADULT
Pulmonology
Infectious Disease
Cardiology
Cardiology
Internal Medicine
Pulmonology
Internal Medicine

## 2025-01-10 NOTE — PROGRESS NOTE ADULT - PROBLEM SELECTOR PLAN 8
Chronic  - On home Lantus 30U in pm, 18 in AM, Novolog sliding scale  - Lantus 25u qhs low dose iss, fingersticks, hypoglycemia protocol  - F/;u AM A1C Chronic  - On home Lantus 30U in pm, 18 in AM, Novolog sliding scale  - Lantus 25u qhs low dose iss, fingersticks, hypoglycemia protocol  - A1c 7.8 Chronic  - On home Lantus 30U in pm, 18 in AM, Novolog sliding scale  - Lantus 25u qhs low dose iss, fingersticks, hypoglycemia protocol  Resume Home Meds on D/C  - A1c 7.8

## 2025-01-10 NOTE — PROGRESS NOTE ADULT - SUBJECTIVE AND OBJECTIVE BOX
[facility]  INFECTIOUS DISEASES   [address] [place] [address]  Tel: [phone]     Fax: [phone]  ========================================================  MD Daquan Moore Michelle, MD Shah, Kaushal, MD Sunjit, Jaspal, MD Sehrish Shahid, MD   ========================================================    N-[id number]  RAYRAYMANOHAR RODRIGUEZ     Follow up: Feels better, no fever, no more SOB at rest. Not on o2.     PAST MEDICAL & SURGICAL HISTORY:  History of COPD  Afib  Aortic valve replaced  Epilepsy  No significant past surgical history    Social Hx: No current smoking, EtOH or drugs     FAMILY HISTORY:  Noncontributory     Allergies  aspirin (Unknown)  ampicillin (Unknown)  Valium (Unknown)  codeine (Unknown)  cefepime (Short breath (Severe))  Percocet (Unknown)  penicillin (Unknown)  Avelox (Unknown)    MEDICATIONS  (STANDING):  dextrose 5%. 1000 milliLiter(s) (50 mL/Hr) IV Continuous <Continuous>  dextrose 5%. 1000 milliLiter(s) (100 mL/Hr) IV Continuous <Continuous>  dextrose 50% Injectable 25 Gram(s) IV Push once  dextrose 50% Injectable 12.5 Gram(s) IV Push once  dextrose 50% Injectable 25 Gram(s) IV Push once  fluticasone propionate/ salmeterol 250-50 MICROgram(s) Diskus 1 Dose(s) Inhalation two times a day  gabapentin 200 milliGRAM(s) Oral three times a day  glucagon  Injectable 1 milliGRAM(s) IntraMuscular once  influenza  Vaccine (HIGH DOSE) 0.5 milliLiter(s) IntraMuscular once  insulin glargine Injectable (LANTUS) 25 Unit(s) SubCutaneous at bedtime  insulin lispro (ADMELOG) corrective regimen sliding scale   SubCutaneous three times a day before meals  insulin lispro Injectable (ADMELOG) 2 Unit(s) SubCutaneous three times a day before meals  labetalol 100 milliGRAM(s) Oral every 8 hours  levETIRAcetam 1000 milliGRAM(s) Oral two times a day  megestrol 20 milliGRAM(s) Oral daily  mexiletine 200 milliGRAM(s) Oral three times a day  mineral oil 30 milliLiter(s) Oral two times a day  NIFEdipine XL 30 milliGRAM(s) Oral daily  polyethylene glycol 3350 17 Gram(s) Oral two times a day  predniSONE   Tablet 20 milliGRAM(s) Oral daily  rosuvastatin 5 milliGRAM(s) Oral at bedtime  sertraline 50 milliGRAM(s) Oral daily     REVIEW OF SYSTEMS:  CONSTITUTIONAL:  No Fever or chills  HEENT:  No diplopia or blurred vision.  No sore throat or runny nose.  CARDIOVASCULAR:  No chest pain   RESPIRATORY:  +cough, no shortness of breath  GASTROINTESTINAL:  No nausea, vomiting or diarrhea.  GENITOURINARY:  No dysuria, frequency or urgency. No Blood in urine  MUSCULOSKELETAL:  no joint aches, no muscle pain  SKIN:  No change in skin, hair or nails.    Physical Exam:  Vital Signs Last 24 Hrs  T(C): 36.8 (10 Facudno 2025 12:37), Max: 36.9 (09 Jan 2025 20:30)  T(F): 98.2 (10 Facundo 2025 12:37), Max: 98.4 (09 Jan 2025 20:30)  HR: 67 (10 Facundo 2025 14:17) (58 - 72)  BP: 138/72 (10 Facundo 2025 12:37) (128/76 - 138/72)  BP(mean): --  RR: 18 (10 Facundo 2025 12:37) (18 - 18)  SpO2: 96% (10 Facundo 2025 14:17) (96% - 98%)  Parameters below as of 10 Facundo 2025 14:17  Patient On (Oxygen Delivery Method): room air  GEN: NAD  HEENT: normocephalic and atraumatic.   NECK: Supple.  No lymphadenopathy   LUNGS: scattered rhonchi   HEART: Regular rate and rhythm   ABDOMEN: Soft, nontender, and nondistended.  EXTREMITIES: Without edema.  NEUROLOGIC: grossly intact.  PSYCHIATRIC: Appropriate affect .  SKIN: No rash     Labs:                        11.5   10.79 )-----------( 304      ( 10 Facundo 2025 08:53 )             36.6     01-10    142  |  110[H]  |  18  ----------------------------<  222[H]  4.1   |  25  |  0.52    Ca    8.8      10 Facundo 2025 08:53    TPro  6.4  /  Alb  3.2[L]  /  TBili  0.3  /  DBili  x   /  AST  11[L]  /  ALT  18  /  AlkPhos  71  01-10    Urinalysis with Rflx Culture (collected 01-08-25 @ 13:48)    Culture - Blood (collected 01-08-25 @ 11:50)  Source: .Blood BLOOD  Preliminary Report (01-09-25 @ 16:01):    No growth at 24 hours    Culture - Blood (collected 01-08-25 @ 11:45)  Source: .Blood BLOOD  Preliminary Report (01-09-25 @ 16:01):    No growth at 24 hours    WBC Count: 10.79 K/uL (01-10-25 @ 08:53)  WBC Count: 11.72 K/uL (01-09-25 @ 05:16)  WBC Count: 10.25 K/uL (01-08-25 @ 11:50)    Creatinine: 0.52 mg/dL (01-10-25 @ 08:53)  Creatinine: 0.80 mg/dL (01-09-25 @ 05:16)  Creatinine: 0.83 mg/dL (01-08-25 @ 11:50)     SARS-CoV-2 Result: NotDetec (01-08-25 @ 11:50)  SARS-CoV-2: NotDetec (01-08-25 @ 11:50)    All imaging and other data have been reviewed.  < from: CT Angio Abdomen and Pelvis w/ IV Cont (01.08.25 @ 14:50) >  IMPRESSION:  Type A aortic dissection extending from the proximal aortic arch,   innominate artery level, to the infrarenal abdominal aorta segment, above   the bifurcation. There are no complications of vessel occlusion or and   organ damage. Read above text for additional findings and detailed   explanations.    < from: CT Chest No Cont (01.08.25 @ 13:00) >  IMPRESSION:  Limited by motion and absence of IV contrast.  Displaced intimal calcifications in the mid to distal descending thoracic   aorta(602:65) consistent with dissection.  Right upper lobe groundglass opacities versus motion artifact. No focal   consolidation. Trace bilateral pleural effusions.  1.4 cm pleural-based right lower lobe nodule. Recommend comparison with   any priorstudies. If none are available, consider PET/CT.  Cardiomegaly. Aortic valve repair.    Assessment and Plan:   75yo woman with PMH of HTN, DM2, afib, COPD/asthma, bronchiectasis, tracheomalacia s/p tracheloplasty, Epilepsy, and Hx of dissection/aneurysm of descending thoracic aorta, s/p AVR and colon cancer was admitted with SOB.   Chest CT showed some GGOs and one nodule, no consolidation.     This is a COPD exacerbation patient, they benefit from treatment with azithromycin, there are some questionable GGO and one nodule in lung, less likely pneumonia.     # COPD exacerbation  # R/O Pneumonia     - Can continue azithromycin to complete total 5days   - Pulmonary follow up   - Possibly needs bronch after discharge   - Can follow up QuantiFeron as outpt     Will sign off please call with any question.     Stanton Peng MD  Division of Infectious Diseases   Please call ID service at [phone] with any question.    50 minutes spent on total encounter assessing patient, examination, chart review, counseling and coordinating care by the attending physician/nurse/care manager.

## 2025-01-10 NOTE — PROGRESS NOTE ADULT - PROBLEM SELECTOR PLAN 5
Chronic BP soft   - HOLD nifedipine and labetalol  as per Cardio Dr Lujan Chronic BP soft   - HOLD nifedipine and labetalol  as per Cardio Dr Pablo

## 2025-01-10 NOTE — PROGRESS NOTE ADULT - PROBLEM SELECTOR PLAN 4
Chronic on inhalers and prednisone  - Cont home inhalers and steroids  - Start solumedrol 20mg IV Q8h  - Breo interchange will start Advair  -Cont Duoneb prn wheezing Chronic on inhalers and prednisone  - Cont home inhalers and steroids  - Breo interchange will start Advair  -Cont Duoneb prn wheezing

## 2025-01-10 NOTE — DISCHARGE NOTE NURSING/CASE MANAGEMENT/SOCIAL WORK - PATIENT PORTAL LINK FT
You can access the FollowMyHealth Patient Portal offered by [facility] by registering at the following website: [email or web address]. By joining [employer]’s FollowMyHealth portal, you will also be able to view your health information using other applications (apps) compatible with our system.

## 2025-01-10 NOTE — CAREGIVER ENGAGEMENT NOTE - CAREGIVER EDUCATION NOTES - FREE TEXT
Per daughter, Zoe, patient takes a medicaid taxi home. She states patient's son will meet her home. CM made daughter aware that 517 form will be faxed to Peg at [facility] [phone] once completed by MD to reinstate aide services.

## 2025-01-10 NOTE — PROGRESS NOTE ADULT - SUBJECTIVE AND OBJECTIVE BOX
[facility] -- Le Jung, Jim Pablo Savella, Goodger, Cohen: Office # [phone]    Follow Up: Chest Pain, Type A/B Dissection     Subjective/Observations: Patient awake, alert, resting in bed. Denies chest pain, palpitations and dizziness. Denies any difficulty breathing. No orthopnea and PND. Tolerating room air. + fatigue.     REVIEW OF SYSTEMS: All other review of systems are negative unless indicated above    PAST MEDICAL & SURGICAL HISTORY:  History of COPD      Afib      Aortic valve replaced      Epilepsy      No significant past surgical history      MEDICATIONS  (STANDING):  albuterol/ipratropium for Nebulization. 3 milliLiter(s) Nebulizer once  apixaban 5 milliGRAM(s) Oral every 12 hours  azithromycin   Tablet 250 milliGRAM(s) Oral daily  dextrose 5%. 1000 milliLiter(s) (50 mL/Hr) IV Continuous <Continuous>  dextrose 5%. 1000 milliLiter(s) (100 mL/Hr) IV Continuous <Continuous>  dextrose 50% Injectable 25 Gram(s) IV Push once  dextrose 50% Injectable 12.5 Gram(s) IV Push once  dextrose 50% Injectable 25 Gram(s) IV Push once  fluticasone propionate/ salmeterol 250-50 MICROgram(s) Diskus 1 Dose(s) Inhalation two times a day  gabapentin 200 milliGRAM(s) Oral three times a day  glucagon  Injectable 1 milliGRAM(s) IntraMuscular once  influenza  Vaccine (HIGH DOSE) 0.5 milliLiter(s) IntraMuscular once  insulin glargine Injectable (LANTUS) 10 Unit(s) SubCutaneous every morning  insulin glargine Injectable (LANTUS) 25 Unit(s) SubCutaneous at bedtime  insulin lispro (ADMELOG) corrective regimen sliding scale   SubCutaneous three times a day before meals  insulin lispro Injectable (ADMELOG) 2 Unit(s) SubCutaneous three times a day before meals  labetalol 100 milliGRAM(s) Oral every 8 hours  levETIRAcetam 1000 milliGRAM(s) Oral two times a day  megestrol 20 milliGRAM(s) Oral daily  mexiletine 200 milliGRAM(s) Oral three times a day  mineral oil 10 milliLiter(s) Oral two times a day  NIFEdipine XL 30 milliGRAM(s) Oral daily  polyethylene glycol 3350 17 Gram(s) Oral two times a day  predniSONE   Tablet 20 milliGRAM(s) Oral daily  rosuvastatin 5 milliGRAM(s) Oral at bedtime  sertraline 50 milliGRAM(s) Oral daily    MEDICATIONS  (PRN):  albuterol    90 MICROgram(s) HFA Inhaler 2 Puff(s) Inhalation every 6 hours PRN Shortness of Breath and/or Wheezing  dextrose Oral Gel 15 Gram(s) Oral once PRN Blood Glucose LESS THAN 70 milliGRAM(s)/deciliter    Allergies    aspirin (Unknown)  ampicillin (Unknown)  Valium (Unknown)  codeine (Unknown)  cefepime (Short breath (Severe))  Percocet (Unknown)  penicillin (Unknown)  Avelox (Unknown)    Intolerances      Vital Signs Last 24 Hrs  T(C): 36.7 (10 Facundo 2025 04:31), Max: 36.9 (09 Jan 2025 20:30)  T(F): 98.1 (10 Facundo 2025 04:31), Max: 98.4 (09 Jan 2025 20:30)  HR: 72 (10 Facundo 2025 04:31) (67 - 72)  BP: 128/76 (10 Facundo 2025 04:31) (128/76 - 145/79)  BP(mean): --  RR: 18 (10 Facundo 2025 04:31) (18 - 18)  SpO2: 97% (10 Facundo 2025 04:31) (97% - 98%)    Parameters below as of 10 Facundo 2025 04:31  Patient On (Oxygen Delivery Method): room air      I&O's Summary    09 Jan 2025 07:01  -  10 Facundo 2025 07:00  --------------------------------------------------------  IN: 600 mL / OUT: 700 mL / NET: -100 mL          TELE: SR 60-90s, PAC   PHYSICAL EXAM:  Constitutional: NAD, awake and alert  HEENT: Moist Mucous Membranes, Anicteric  Pulmonary: Non-labored, breath sounds are clear bilaterally, No wheezing, rales or rhonchi  Cardiovascular: Regular, S1 and S2, No murmurs, No rubs, gallops or clicks  Gastrointestinal:  soft, nontender, nondistended   Lymph: No peripheral edema. No lymphadenopathy.   Skin: No visible rashes or ulcers.  Psych:  Mood & affect appropriate      LABS: All Labs Reviewed:                        11.5   10.79 )-----------( 304      ( 10 Facundo 2025 08:53 )             36.6                         11.3   11.72 )-----------( 255      ( 09 Jan 2025 05:16 )             35.6                         11.7   10.25 )-----------( 255      ( 08 Jan 2025 11:50 )             37.6     10 Facundo 2025 08:53    142    |  110    |  18     ----------------------------<  222    4.1     |  25     |  0.52   09 Jan 2025 05:16    140    |  108    |  19     ----------------------------<  280    4.3     |  26     |  0.80   08 Jan 2025 11:50    141    |  108    |  16     ----------------------------<  188    5.1     |  27     |  0.83     Ca    8.8        10 Facundo 2025 08:53  Ca    9.2        09 Jan 2025 05:16  Ca    10.1       08 Jan 2025 11:50    TPro  6.4    /  Alb  3.2    /  TBili  0.3    /  DBili  x      /  AST  11     /  ALT  18     /  AlkPhos  71     10 Facundo 2025 08:53  TPro  6.5    /  Alb  3.2    /  TBili  0.3    /  DBili  x      /  AST  9      /  ALT  18     /  AlkPhos  78     09 Jan 2025 05:16  TPro  7.1    /  Alb  3.5    /  TBili  0.2    /  DBili  x      /  AST  26     /  ALT  21     /  AlkPhos  79     08 Jan 2025 11:50   LIVER FUNCTIONS - ( 10 Facundo 2025 08:53 )  Alb: 3.2 g/dL / Pro: 6.4 g/dL / ALK PHOS: 71 U/L / ALT: 18 U/L / AST: 11 U/L / GGT: x           Troponin I, High Sensitivity Result: 12.6 ng/L (01-08-25 @ 11:50)  Cholesterol: 140 mg/dL (01-09-25 @ 05:16)  HDL Cholesterol: 70 mg/dL (01-09-25 @ 05:16)  Triglycerides, Serum: 66 mg/dL (01-09-25 @ 05:16)  Lactate, Blood: 2.4 mmol/L (01-08-25 @ 15:00)  Lactate, Blood: 2.5 mmol/L (01-08-25 @ 11:50)    12 Lead ECG:   Ventricular Rate 53 BPM    QRS Duration 134 ms    Q-T Interval 464 ms    QTC Calculation(Bazett) 435 ms    R Axis -76 degrees    T Axis -39 degrees    Diagnosis Line Wide QRS rhythm  Right bundle branch block  Left anterior fascicular block  *** Bifascicular block ***  T wave abnormality, consider lateral ischemia  Abnormal ECG  No previous ECGs available  Confirmed by Scott Lujan MD ([id number]) on 1/8/2025 1:52:02 PM (01-08-25 @ 11:04)

## 2025-01-10 NOTE — PROGRESS NOTE ADULT - NS ATTEND AMEND GEN_ALL_CORE FT
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  Evaluated by Dr. 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  Also has been coughing but denies fever, chills, sick contact, recent travel.  Denies palpitations or orthopnea.  Admits to SOB and KRAMER.  In the ED, her troponin was normal. EKG showed junctional rhythm, rate of 50's, with RBBB and LAD.     - Likely, with some pleuritic component.  However, with known Hx of Type A and B dissection.    - S/p bioAVR and Bental procedure for Type A.  Though, Type B is medically managed  - CTA aorta showed Type A dissection extending from the proximal arch, innominate artery level, to the infrarenal abdominal aorta segment above the bifurcation.   - ED Attending discussed CTA result with CT Surgeon Nando and was determined to be stable.  No surgical intervention recommended  - Continue labetalol 100 q8h, NIFEdipine XL 30 daily  - Continue statin   - Tele w/ SR 60-90s, pAC, no events, can d./c telemetry   - Continue Eliquis   - Continue mexiletine 200 tid   - CT chest with small pleural effusions but no definite volume overload on exam

## 2025-01-10 NOTE — PROGRESS NOTE ADULT - PROBLEM SELECTOR PLAN 10
DVT PPX  Cont ELIQUIS and Plavix DVT PPX  Cont ELIQUIS and Plavix    DC planning for home with home care

## 2025-01-10 NOTE — PROGRESS NOTE ADULT - PROBLEM SELECTOR PLAN 3
CT: Type A aortic dissection extending from the proximal aortic arch, innominate artery level, to the infrarenal abdominal aorta segment, above the bifurcation. There are no complications of vessel occlusion or and organ damage. Read above text for additional findings and detailed explanations.   As per CT sx from [facility] -no  vascular intervention needed  Cardio on case d/w
/a/P    IMPRESSION:  Type A aortic dissection extending from the proximal aortic arch,   innominate artery level, to the infrarenal abdominal aorta segment, above   the bifurcation. There are no complications of vessel occlusion or and   organ damage. Read above text for additional findings and detailed   explanations.   As per CT sx from [facility] -no  vascular intervention needed  Cardio on case d/w

## 2025-01-10 NOTE — PROGRESS NOTE ADULT - ASSESSMENT
74yFemale pmhx of Epilepsy on Keppra, COPD, asthma on chronic steroids, bronchiectasis, tracheomalacia s/p treacheopalsty, HTN, Hx of dissection of descending thoracic aorta s/p repair, hx of aortic aneurysm, afib on eliquis, DM, Colon cancer, neuropathy, presents to the ED for shortness of breath    aortic dissection  tree in bud  atelectasis  copd  seizure disorder  OP  OA  Bronchiectasis  tracheomalacia  AFIB  DM  hx of Neuropathy  hx of Colon Ca    vs noted  meds reviewed  cardio f/u  full code  pt may go to Home dose of systemic steroid upon DC -   follow up with Dr Allison - [facility]    cardio eval - ct angio reviewed - type A Aortic Dissection -   old records reviewed -   follows with DR Allison Pulmonary Medicine - [facility] practice -   ct chest imaging - tree in bud - bronchiectasis - Mild atelectatic changes at both lung bases. Mild diffuse bronchial wall thickening,  Advair - Proventil PRN - low dose Prednisone  cough rx regimen  I nadiya  VBG  Cvs rx regimen  BP control  on DOAC for AF  rate and rhythm control  outpatient records reviewed  monitor VS and HD and Sat  goal sat > 88 pct  nutritional assessment  GOC discussion   5

## 2025-01-10 NOTE — PROGRESS NOTE ADULT - REASON FOR ADMISSION
COPD Exacerbation

## 2025-01-10 NOTE — PROGRESS NOTE ADULT - ASSESSMENT
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  Evaluated by Dr. 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  Also has been coughing but denies fever, chills, sick contact, recent travel.  Denies palpitations or orthopnea.  Admits to SOB and KRAMER.  In the ED, her troponin was normal. EKG showed junctional rhythm, rate of 50's, with RBBB and LAD.     Chest Pain/Aortic Dissection/HTN  - No known Hx of CAD.    - Likely, with some pleuritic component.  However, with known Hx of Type A and B dissection.    - S/p bioAVR and Bental procedure for Type A.  Though, Type B is medically managed  - CTA aorta showed Type A dissection extending from the proximal arch, innominate artery level, to the infrarenal abdominal aorta segment above the bifurcation.   - ED Attending discussed CTA result with CT Surgeon Nando and was determined to be stable.  No surgical intervention recommended  - Remains hemodynamically stable.    - -140s. Will keep SBP <110  - Continue labetalol 100 q8h, NIFEdipine XL 30 daily    - Initial EKG showed junctional rhythm with RBBB, LAD.  With known bifascicular block.    - Troponin: <-12.6  - No evidence of any active ischemia   - Continue statin     - Known  Afib   - Tele w/ SR 60-90s, pAC, no events, can d./c telemetry   - Continue Eliquis   - Continue mexiletine 200 tid     - No evidence of volume overload  - Pro-BNP normal  - CT chest with small pleural effusions but no definite volume overload on exam  - Follow pulmonary recommendations     - Monitor and replete lytes, keep K>4, Mg>2.  - Will continue to follow.    Katarina De La Cruz, MS FNP, AGACNP  Nurse Practitioner- Cardiology   Please call on TEAMS

## 2025-01-10 NOTE — PROGRESS NOTE ADULT - ATTENDING COMMENTS
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  Evaluated by Dr. 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  Admitting for COPD exacerbation, Acute on chronic hypoxic respiratory failure -Resolved   Pt seen, examined, case & care plan d/w pt, residents at detail. D/W Dtra t detail Via Pt's Phone Care plan , ABx , D/C Plan d/w pt & Dtr  Pulmonary-Dr Luis-PO steroids change to Home meds on d/c  Cardio- DR OCTAVIA Fernandez & NP follow up d/w Restart all BP Meds & AC   PT Eval-[facility] PT,   AM labs   PO diet   Palliative care eval  Total Care time is 60 minutes.
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  Evaluated by Dr. 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  Admitting for COPD exacerbation, Acute on chronic hypoxic respiratory failure -Resolved   Pt seen, examined, case & care plan d/w pt, residents at detail. D/W Dtra t detail Via Pt's Phone Care plan , ABx , D/C Plan d/w pt & Dtr  Pulmonary-Dr Luis-PO steroids change to Home meds on d/c  Cardio- DR diaz follow up continue ALl meds   ID Dr Peng-Zithromax daily x 5 days   PT Eval -[facility] PT,   PO diet   Palliative care eval  D/C Home today   Total  d/cCare time is 60 minutes.

## 2025-01-10 NOTE — PROGRESS NOTE ADULT - ASSESSMENT
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  Evaluated by Dr. 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 Colostomy   Evaluated by Dr. 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  Admitting for COPD exacerbation, Acute on chronic hypoxic respiratory failure

## 2025-01-10 NOTE — PROGRESS NOTE ADULT - PROBLEM SELECTOR PLAN 2
-On ELIQUIS,  HOLD AC as per Cardio-Dr Lujan   - Would hold Eliquis for now  - Would hold any AVN blocker  - HOLD  mexiletine 200mg tid  HR -Bradycardia -On ELIQUIS,  Held AC initially as per Cardio-Dr Lujan   Continue mexiletine 200mg tid -On ELIQUIS, 2x day  Continue mexiletine 200mg tid

## 2025-01-10 NOTE — SOCIAL WORK PROGRESS NOTE - NSSWPROGRESSNOTE_GEN_ALL_CORE
Dave has been in contact with Peg PALOMARES [phone] she received 517 that was faxed to [phone]. She has approved resumption of pca services 12 hrs x 7 days through [facility] as well as Cdap for 12 hours which is provided by pt's daughter Zoe Flores. Pt being transported home via Medicaid taxi via [employer] [phone]  at 4:00 pm today. Auth #[id number]. Dave will remain available.

## 2025-01-10 NOTE — PROGRESS NOTE ADULT - SUBJECTIVE AND OBJECTIVE BOX
Date/Time Patient Seen:  		  Referring MD:   Data Reviewed	       Patient is a 74y old  Female who presents with a chief complaint of COPD Exacerbation (09 Jan 2025 12:51)      Subjective/HPI     PAST MEDICAL & SURGICAL HISTORY:  History of COPD    Afib    Aortic valve replaced    Epilepsy    No significant past surgical history          Medication list         MEDICATIONS  (STANDING):  apixaban 5 milliGRAM(s) Oral every 12 hours  azithromycin   Tablet 250 milliGRAM(s) Oral daily  dextrose 5%. 1000 milliLiter(s) (50 mL/Hr) IV Continuous <Continuous>  dextrose 5%. 1000 milliLiter(s) (100 mL/Hr) IV Continuous <Continuous>  dextrose 50% Injectable 25 Gram(s) IV Push once  dextrose 50% Injectable 12.5 Gram(s) IV Push once  dextrose 50% Injectable 25 Gram(s) IV Push once  fluticasone propionate/ salmeterol 250-50 MICROgram(s) Diskus 1 Dose(s) Inhalation two times a day  gabapentin 200 milliGRAM(s) Oral three times a day  glucagon  Injectable 1 milliGRAM(s) IntraMuscular once  influenza  Vaccine (HIGH DOSE) 0.5 milliLiter(s) IntraMuscular once  insulin glargine Injectable (LANTUS) 10 Unit(s) SubCutaneous every morning  insulin glargine Injectable (LANTUS) 25 Unit(s) SubCutaneous at bedtime  insulin lispro (ADMELOG) corrective regimen sliding scale   SubCutaneous three times a day before meals  insulin lispro Injectable (ADMELOG) 2 Unit(s) SubCutaneous three times a day before meals  labetalol 100 milliGRAM(s) Oral every 8 hours  levETIRAcetam 1000 milliGRAM(s) Oral two times a day  megestrol 20 milliGRAM(s) Oral daily  mexiletine 200 milliGRAM(s) Oral three times a day  mineral oil 10 milliLiter(s) Oral two times a day  NIFEdipine XL 30 milliGRAM(s) Oral daily  polyethylene glycol 3350 17 Gram(s) Oral two times a day  predniSONE   Tablet 20 milliGRAM(s) Oral daily  rosuvastatin 5 milliGRAM(s) Oral at bedtime  sertraline 50 milliGRAM(s) Oral daily    MEDICATIONS  (PRN):  albuterol    90 MICROgram(s) HFA Inhaler 2 Puff(s) Inhalation every 6 hours PRN Shortness of Breath and/or Wheezing  dextrose Oral Gel 15 Gram(s) Oral once PRN Blood Glucose LESS THAN 70 milliGRAM(s)/deciliter         Vitals log        ICU Vital Signs Last 24 Hrs  T(C): 36.7 (10 Facundo 2025 04:31), Max: 36.9 (09 Jan 2025 20:30)  T(F): 98.1 (10 Facundo 2025 04:31), Max: 98.4 (09 Jan 2025 20:30)  HR: 72 (10 Facundo 2025 04:31) (67 - 72)  BP: 128/76 (10 Facundo 2025 04:31) (128/70 - 152/77)  BP(mean): --  ABP: --  ABP(mean): --  RR: 18 (10 Facundo 2025 04:31) (17 - 20)  SpO2: 97% (10 Facundo 2025 04:31) (93% - 98%)    O2 Parameters below as of 10 Facundo 2025 04:31  Patient On (Oxygen Delivery Method): room air                 Input and Output:  I&O's Detail    09 Jan 2025 07:01  -  10 Facundo 2025 05:32  --------------------------------------------------------  IN:    Oral Fluid: 600 mL  Total IN: 600 mL    OUT:    Blood Loss (mL): 700 mL  Total OUT: 700 mL    Total NET: -100 mL          Lab Data                        11.3   11.72 )-----------( 255      ( 09 Jan 2025 05:16 )             35.6     01-09    140  |  108  |  19  ----------------------------<  280[H]  4.3   |  26  |  0.80    Ca    9.2      09 Jan 2025 05:16    TPro  6.5  /  Alb  3.2[L]  /  TBili  0.3  /  DBili  x   /  AST  9[L]  /  ALT  18  /  AlkPhos  78  01-09            Review of Systems	      Objective     Physical Examination    heart s1s2  lung dec BS  head nc  head at      Pertinent Lab findings & Imaging      Seven:  NO   Adequate UO     I&O's Detail    09 Jan 2025 07:01  -  10 Facundo 2025 05:32  --------------------------------------------------------  IN:    Oral Fluid: 600 mL  Total IN: 600 mL    OUT:    Blood Loss (mL): 700 mL  Total OUT: 700 mL    Total NET: -100 mL               Discussed with:     Cultures:	        Radiology

## 2025-01-10 NOTE — PROGRESS NOTE ADULT - PROBLEM SELECTOR PLAN 9
On home gabapentin 200mg tid and [id number] qd, continue
On home gabapentin 200mg tid and [id number] qd, continue

## 2025-01-14 ENCOUNTER — NON-APPOINTMENT (OUTPATIENT)
Age: 75
End: 2025-01-14

## 2025-01-15 ENCOUNTER — OUTPATIENT (OUTPATIENT)
Dept: OUTPATIENT SERVICES | Facility: HOSPITAL | Age: 75
LOS: 1 days | End: 2025-01-15

## 2025-01-15 ENCOUNTER — APPOINTMENT (OUTPATIENT)
Dept: INTERNAL MEDICINE | Facility: CLINIC | Age: 75
End: 2025-01-15

## 2025-01-15 VITALS
SYSTOLIC BLOOD PRESSURE: 118 MMHG | HEART RATE: 76 BPM | HEIGHT: 67 IN | WEIGHT: 152.38 LBS | BODY MASS INDEX: 23.92 KG/M2 | DIASTOLIC BLOOD PRESSURE: 68 MMHG | OXYGEN SATURATION: 96 %

## 2025-01-15 DIAGNOSIS — I10 ESSENTIAL (PRIMARY) HYPERTENSION: ICD-10-CM

## 2025-01-15 DIAGNOSIS — Z98.890 OTHER SPECIFIED POSTPROCEDURAL STATES: Chronic | ICD-10-CM

## 2025-01-15 DIAGNOSIS — Z98.89 OTHER SPECIFIED POSTPROCEDURAL STATES: Chronic | ICD-10-CM

## 2025-01-15 DIAGNOSIS — Z87.09 PERSONAL HISTORY OF OTHER DISEASES OF THE RESPIRATORY SYSTEM: Chronic | ICD-10-CM

## 2025-01-15 DIAGNOSIS — Z93.3 COLOSTOMY STATUS: Chronic | ICD-10-CM

## 2025-01-15 DIAGNOSIS — Z96.653 PRESENCE OF ARTIFICIAL KNEE JOINT, BILATERAL: Chronic | ICD-10-CM

## 2025-01-15 DIAGNOSIS — Z95.2 PRESENCE OF PROSTHETIC HEART VALVE: Chronic | ICD-10-CM

## 2025-01-15 LAB
GAMMA INTERFERON BACKGROUND BLD IA-ACNC: 0.03 IU/ML — SIGNIFICANT CHANGE UP
M TB IFN-G BLD-IMP: NEGATIVE — SIGNIFICANT CHANGE UP
M TB IFN-G CD4+ BCKGRND COR BLD-ACNC: 0 IU/ML — SIGNIFICANT CHANGE UP
M TB IFN-G CD4+CD8+ BCKGRND COR BLD-ACNC: 0.02 IU/ML — SIGNIFICANT CHANGE UP
QUANT TB PLUS MITOGEN MINUS NIL: 1.48 IU/ML — SIGNIFICANT CHANGE UP

## 2025-01-15 PROCEDURE — G0008: CPT

## 2025-01-15 PROCEDURE — 99495 TRANSJ CARE MGMT MOD F2F 14D: CPT | Mod: 25

## 2025-01-15 PROCEDURE — 90662 IIV NO PRSV INCREASED AG IM: CPT

## 2025-01-21 ENCOUNTER — OUTPATIENT (OUTPATIENT)
Dept: OUTPATIENT SERVICES | Facility: HOSPITAL | Age: 75
LOS: 1 days | End: 2025-01-21
Payer: MEDICARE

## 2025-01-21 ENCOUNTER — APPOINTMENT (OUTPATIENT)
Dept: ULTRASOUND IMAGING | Facility: CLINIC | Age: 75
End: 2025-01-21
Payer: MEDICARE

## 2025-01-21 DIAGNOSIS — Z98.89 OTHER SPECIFIED POSTPROCEDURAL STATES: Chronic | ICD-10-CM

## 2025-01-21 DIAGNOSIS — Z95.2 PRESENCE OF PROSTHETIC HEART VALVE: Chronic | ICD-10-CM

## 2025-01-21 DIAGNOSIS — Z93.3 COLOSTOMY STATUS: Chronic | ICD-10-CM

## 2025-01-21 DIAGNOSIS — Z98.890 OTHER SPECIFIED POSTPROCEDURAL STATES: Chronic | ICD-10-CM

## 2025-01-21 DIAGNOSIS — Z96.653 PRESENCE OF ARTIFICIAL KNEE JOINT, BILATERAL: Chronic | ICD-10-CM

## 2025-01-21 DIAGNOSIS — K62.5 HEMORRHAGE OF ANUS AND RECTUM: Chronic | ICD-10-CM

## 2025-01-21 DIAGNOSIS — Z87.09 PERSONAL HISTORY OF OTHER DISEASES OF THE RESPIRATORY SYSTEM: Chronic | ICD-10-CM

## 2025-01-21 DIAGNOSIS — H05.352 EXOSTOSIS OF LEFT ORBIT: Chronic | ICD-10-CM

## 2025-01-21 DIAGNOSIS — M79.673 PAIN IN UNSPECIFIED FOOT: ICD-10-CM

## 2025-01-21 PROCEDURE — 76882 US LMTD JT/FCL EVL NVASC XTR: CPT

## 2025-01-21 PROCEDURE — 76882 US LMTD JT/FCL EVL NVASC XTR: CPT | Mod: 26,RT

## 2025-01-21 NOTE — CONSULT NOTE ADULT - PROBLEM SELECTOR PROBLEM 7
Department of Anesthesiology  Preprocedure Note       Name:  Bo Greenberg   Age:  80 y.o.  :  1944                                          MRN:  [id number]         Date:  2025      Surgeon: Surgeon(s):  Arsen Villaseñor MD    Procedure: Procedure(s):  L5-S1 POSTERIOR FUSION, L1-L5 HARDWARE REMOVAL, L1-S1 INSTRUMENTION (MAZOR)    Medications prior to admission:   Prior to Admission medications    Medication Sig Start Date End Date Taking? Authorizing Provider   OXYGEN Inhale 3 L into the lungs as needed for Shortness of Breath   Yes Provider, MD Fuad   metFORMIN (GLUCOPHAGE) 1000 MG tablet Take 1 tablet by mouth with breakfast and with evening meal 24  Yes Jairo Mcgowan DO   folic acid (FOLVITE) 1 MG tablet Take 1 tablet by mouth daily, except on the day that methotrexate is taken. 10/1/24  Yes Jairo Mcgowan DO   ezetimibe (ZETIA) 10 MG tablet Take 1 tablet by mouth daily  Patient taking differently: Take 1 tablet by mouth every morning 24  Yes Jairo Mcgowan DO   omeprazole (PRILOSEC) 40 MG delayed release capsule TAKE 1 CAPSULE DAILY  Patient taking differently: Take 1 capsule by mouth every morning 24  Yes Venita Molina DO   acetaminophen (TYLENOL) 500 MG tablet Take 2 tablets by mouth every 6 hours 21  Yes Automatic Reconciliation, Ar   atorvastatin (LIPITOR) 40 MG tablet Take 1 tablet by mouth every morning 20  Yes Automatic Reconciliation, Ar   ferrous sulfate (IRON 325) 325 (65 Fe) MG tablet Take 1 tablet by mouth daily (with breakfast) 21  Yes Automatic Reconciliation, Ar   irbesartan (AVAPRO) 150 MG tablet Take 1 tablet by mouth every morning ceived the following from [facility]: Outside name: irbesartan (AVAPRO) 150 mg tablet 22  Yes Automatic Reconciliation, Ar   ranolazine (RANEXA) 500 MG extended release tablet Take 1 tablet by mouth 2 times daily 12/15/20  Yes Automatic Reconciliation, Ar   Calcium Carb-Cholecalciferol 
Acute cystitis without hematuria

## 2025-01-22 ENCOUNTER — APPOINTMENT (OUTPATIENT)
Dept: CARDIOLOGY | Facility: CLINIC | Age: 75
End: 2025-01-22
Payer: MEDICARE

## 2025-01-22 ENCOUNTER — APPOINTMENT (OUTPATIENT)
Dept: ENDOCRINOLOGY | Facility: CLINIC | Age: 75
End: 2025-01-22
Payer: MEDICARE

## 2025-01-22 VITALS
WEIGHT: 152.38 LBS | HEIGHT: 67 IN | HEART RATE: 70 BPM | DIASTOLIC BLOOD PRESSURE: 70 MMHG | BODY MASS INDEX: 23.92 KG/M2 | SYSTOLIC BLOOD PRESSURE: 120 MMHG | TEMPERATURE: 97 F | OXYGEN SATURATION: 94 %

## 2025-01-22 DIAGNOSIS — E55.9 VITAMIN D DEFICIENCY, UNSPECIFIED: ICD-10-CM

## 2025-01-22 DIAGNOSIS — I10 ESSENTIAL (PRIMARY) HYPERTENSION: ICD-10-CM

## 2025-01-22 DIAGNOSIS — R53.83 OTHER FATIGUE: ICD-10-CM

## 2025-01-22 LAB
BACTERIA SPT CULT: NORMAL
GLUCOSE BLDC GLUCOMTR-MCNC: 185

## 2025-01-22 PROCEDURE — 82962 GLUCOSE BLOOD TEST: CPT

## 2025-01-22 PROCEDURE — 99214 OFFICE O/P EST MOD 30 MIN: CPT

## 2025-01-22 PROCEDURE — 96041 GENETIC COUNSELING SVC EA 30: CPT

## 2025-01-22 PROCEDURE — G2211 COMPLEX E/M VISIT ADD ON: CPT

## 2025-01-22 PROCEDURE — 99215 OFFICE O/P EST HI 40 MIN: CPT | Mod: 2W

## 2025-01-23 RX ORDER — INSULIN GLARGINE-YFGN 100 [IU]/ML
30 INJECTION, SOLUTION SUBCUTANEOUS
Refills: 0 | DISCHARGE

## 2025-01-23 RX ORDER — BUDESONIDE 0.5 MG/2ML
2 INHALANT ORAL
Refills: 0 | DISCHARGE

## 2025-01-23 RX ORDER — INSULIN GLARGINE-YFGN 100 [IU]/ML
18 INJECTION, SOLUTION SUBCUTANEOUS
Refills: 0 | DISCHARGE

## 2025-01-23 RX ORDER — LEVETIRACETAM 100 MG/ML
2 SOLUTION ORAL
Refills: 0 | DISCHARGE

## 2025-01-23 RX ORDER — APIXABAN 5 MG/1
1 TABLET, FILM COATED ORAL
Refills: 0 | DISCHARGE

## 2025-01-23 RX ORDER — GABAPENTIN 300 MG/1
200 CAPSULE ORAL
Refills: 0 | DISCHARGE

## 2025-01-23 RX ORDER — MEGESTROL ACETATE 40 MG/1
1 TABLET ORAL
Refills: 0 | DISCHARGE

## 2025-01-23 RX ORDER — TEZEPELUMAB-EKKO 210 MG/1.9ML
210 INJECTION, SOLUTION SUBCUTANEOUS
Refills: 0 | DISCHARGE

## 2025-01-23 RX ORDER — GLYCOPYRROLATE 0.2 MG/ML
1 INJECTION, SOLUTION INTRAMUSCULAR; INTRAVENOUS
Refills: 0 | DISCHARGE

## 2025-01-23 RX ORDER — PANTOPRAZOLE 40 MG/1
1 TABLET, DELAYED RELEASE ORAL
Refills: 0 | DISCHARGE

## 2025-01-23 RX ORDER — SERTRALINE HYDROCHLORIDE 25 MG/1
1 TABLET ORAL
Refills: 0 | DISCHARGE

## 2025-01-23 RX ORDER — MEXILETINE HYDROCHLORIDE 150 MG/1
1 CAPSULE ORAL
Refills: 0 | DISCHARGE

## 2025-01-23 RX ORDER — INSULIN LISPRO 100/ML
0 VIAL (ML) SUBCUTANEOUS
Refills: 0 | DISCHARGE

## 2025-01-23 RX ORDER — LABETALOL HCL 300 MG/1
1 TABLET, FILM COATED ORAL
Refills: 0 | DISCHARGE

## 2025-01-23 RX ORDER — FLUTICASONE FUROATE AND VILANTEROL TRIFENATATE 200; 25 UG/1; UG/1
1 POWDER RESPIRATORY (INHALATION)
Refills: 0 | DISCHARGE

## 2025-01-23 RX ORDER — CLOPIDOGREL BISULFATE 75 MG/1
1 TABLET, FILM COATED ORAL
Refills: 0 | DISCHARGE

## 2025-01-23 RX ORDER — IPRATROPIUM BROMIDE AND ALBUTEROL SULFATE .5; 2.5 MG/3ML; MG/3ML
3 SOLUTION RESPIRATORY (INHALATION)
Refills: 0 | DISCHARGE

## 2025-01-23 RX ORDER — METHYLPREDNISOLONE 4 MG/1
1 TABLET ORAL
Refills: 0 | DISCHARGE

## 2025-01-23 RX ORDER — FORMOTEROL FUMARATE DIHYDRATE 20 UG/2ML
2 SOLUTION RESPIRATORY (INHALATION)
Refills: 0 | DISCHARGE

## 2025-01-23 RX ORDER — ROSUVASTATIN 40 MG/1
1 TABLET, FILM COATED ORAL
Refills: 0 | DISCHARGE

## 2025-01-23 RX ORDER — NIFEDIPINE 60 MG/1
1 TABLET, EXTENDED RELEASE ORAL
Refills: 0 | DISCHARGE

## 2025-01-23 RX ORDER — FERROUS SULFATE 325(65) MG
325 TABLET ORAL
Refills: 0 | DISCHARGE

## 2025-01-24 ENCOUNTER — APPOINTMENT (OUTPATIENT)
Dept: PULMONOLOGY | Facility: CLINIC | Age: 75
End: 2025-01-24
Payer: MEDICARE

## 2025-01-24 VITALS
SYSTOLIC BLOOD PRESSURE: 152 MMHG | OXYGEN SATURATION: 97 % | HEIGHT: 67 IN | TEMPERATURE: 97 F | WEIGHT: 152 LBS | BODY MASS INDEX: 23.86 KG/M2 | HEART RATE: 72 BPM | DIASTOLIC BLOOD PRESSURE: 77 MMHG | RESPIRATION RATE: 17 BRPM

## 2025-01-24 PROCEDURE — 96372 THER/PROPH/DIAG INJ SC/IM: CPT

## 2025-01-24 RX ORDER — TEZEPELUMAB-EKKO 210 MG/1.9ML
210 INJECTION, SOLUTION SUBCUTANEOUS
Qty: 0 | Refills: 0 | Status: COMPLETED | OUTPATIENT
Start: 2025-01-24

## 2025-01-27 ENCOUNTER — APPOINTMENT (OUTPATIENT)
Dept: ELECTROPHYSIOLOGY | Facility: CLINIC | Age: 75
End: 2025-01-27
Payer: MEDICARE

## 2025-01-27 ENCOUNTER — NON-APPOINTMENT (OUTPATIENT)
Age: 75
End: 2025-01-27

## 2025-01-27 VITALS
TEMPERATURE: 98.3 F | WEIGHT: 153 LBS | SYSTOLIC BLOOD PRESSURE: 119 MMHG | HEIGHT: 67 IN | HEART RATE: 74 BPM | DIASTOLIC BLOOD PRESSURE: 75 MMHG | BODY MASS INDEX: 24.01 KG/M2 | OXYGEN SATURATION: 97 %

## 2025-01-27 DIAGNOSIS — I49.3 VENTRICULAR PREMATURE DEPOLARIZATION: ICD-10-CM

## 2025-01-27 DIAGNOSIS — I10 ESSENTIAL (PRIMARY) HYPERTENSION: ICD-10-CM

## 2025-01-27 DIAGNOSIS — E78.5 HYPERLIPIDEMIA, UNSPECIFIED: ICD-10-CM

## 2025-01-27 DIAGNOSIS — I48.91 UNSPECIFIED ATRIAL FIBRILLATION: ICD-10-CM

## 2025-01-27 PROCEDURE — 99214 OFFICE O/P EST MOD 30 MIN: CPT

## 2025-01-27 PROCEDURE — 93000 ELECTROCARDIOGRAM COMPLETE: CPT

## 2025-01-29 ENCOUNTER — APPOINTMENT (OUTPATIENT)
Dept: OTOLARYNGOLOGY | Facility: CLINIC | Age: 75
End: 2025-01-29

## 2025-01-30 ENCOUNTER — APPOINTMENT (OUTPATIENT)
Dept: NEUROLOGY | Facility: CLINIC | Age: 75
End: 2025-01-30

## 2025-01-30 NOTE — PROVIDER CONTACT NOTE (HYPOGLYCEMIA EVENT) - NSPROVCONTHYPO_TYPEOFDIABETES
CHIEF COMPLAINT: Concern for UTI    HISTORY OF PRESENT ILLNESS: 24-year-old female presents with onset of urinary symptoms today.  She has noticed dysuria, frequency urgency.  Voiding small frequent amounts.  She states this is usually how she feels when she has a bladder infection.  No abdominal pain pelvic pain or back pain.  No fever chills nausea vomiting or systemic symptoms.    She is sexually active with a male monogamous partner.  She denies any concerns for STDs.  She has no vaginal discharge lesions or itching.  She uses condoms compliantly for birth control management.  No unprotected sex since her last normal on time..    History reviewed. No pertinent past medical history.    History reviewed. No pertinent surgical history.    Current Outpatient Medications   Medication Sig Dispense Refill    nitrofurantoin, macrocrystal-monohydrate, (MACROBID) 100 MG capsule Take 1 capsule by mouth in the morning and 1 capsule in the evening. Do all this for 7 days. 14 capsule 0     No current facility-administered medications for this visit.       Allergies as of 01/29/2025    (No Known Allergies)       Social History     Socioeconomic History    Marital status: Single     Spouse name: Not on file    Number of children: Not on file    Years of education: Not on file    Highest education level: Not on file   Occupational History    Not on file   Tobacco Use    Smoking status: Never    Smokeless tobacco: Never   Substance and Sexual Activity    Alcohol use: Yes     Comment: rare    Drug use: Never    Sexual activity: Not on file   Other Topics Concern    Not on file   Social History Narrative    Not on file     Social Determinants of Health     Financial Resource Strain: Not on file   Food Insecurity: Not on file   Transportation Needs: Not on file   Physical Activity: Not on file   Stress: Not on file   Social Connections: Not on file   Interpersonal Safety: Not on file       History reviewed. No pertinent family  history.      REVIEW OF SYSTEMS:  General: See HPI  Cardiovascular: Denies chest pain or palpitations.  Respiratory: Denies cough, shortness of breath.  GastrointestinaI: Denies abdominal pain nausea vomiting diarrhea  Genitourinary: Denies vaginal discharge, denies visible hematuria.  Musculoskeletal: Denies body aches/pains.  Neurological: Denies headache.        OBJECTIVE:  VITAL SIGNS:  Visit Vitals  /68 (BP Location: LUE - Left upper extremity, Patient Position: Sitting, Cuff Size: Regular)   Pulse 81   Temp 98 °F (36.7 °C) (Oral)   Resp 15   Ht 5' 3\" (1.6 m)   Wt 49.8 kg (109 lb 12.8 oz)   LMP 01/19/2025 (Exact Date)   SpO2 99%   BMI 19.45 kg/m²     GENERAL:  Tiarra is a 24 year old-year-old female who appears well and in no acute distress.   HEAD, EYES, EARS, NOSE, THROAT: OP-MMM(OROPHARYNX - MOIST MUCOUS MEMBRANES).   RESPIRATORY: CTA(Clear to auscultation) bilateral, no wheezing, rhonchi, rales; no increased respiratory effort.  GASTROINTESTINAL: Abdomen: Soft flat nontender nondistended no hepatosplenomegaly normal bowel sounds.  No CVA or flank tenderness.  MUSCULOSKELETAL: Moving extremities equally, normal gait.      SKIN:  Warm and dry, well-perfused.  No cyanosis, clubbing, or edema.  NEUROLOGICAL:  Alert and awake.  PSYCHIATRIC:  Speech and behavior appropriate.      [unfilled]  Clinical Course: No results found for this visit on 01/29/25.      ASSESSMENT:   #1 bladder infection    PLAN:   Macrobid 100, 1 p.o. twice daily x 7 days  Plenty of fluids to avoid dehydration.  Return here or visit ER(emergency room) if pain persists, develops nausea/vomiting, fevers, or back pain.  Patient verbalizes understanding and agreement with the plan as documented in the patient instructions.       Diabetes, Type 2

## 2025-01-31 DIAGNOSIS — E11.9 TYPE 2 DIABETES MELLITUS W/OUT COMPLICATIONS: ICD-10-CM

## 2025-01-31 PROBLEM — Z86.69 PERSONAL HISTORY OF OTHER DISEASES OF THE NERVOUS SYSTEM AND SENSE ORGANS: Chronic | Status: ACTIVE | Noted: 2025-01-10

## 2025-01-31 PROBLEM — G40.909 EPILEPSY, UNSPECIFIED, NOT INTRACTABLE, WITHOUT STATUS EPILEPTICUS: Chronic | Status: ACTIVE | Noted: 2025-01-08

## 2025-01-31 PROBLEM — I10 ESSENTIAL (PRIMARY) HYPERTENSION: Chronic | Status: ACTIVE | Noted: 2025-01-10

## 2025-01-31 PROBLEM — J45.909 UNSPECIFIED ASTHMA, UNCOMPLICATED: Chronic | Status: ACTIVE | Noted: 2025-01-10

## 2025-01-31 PROBLEM — J47.9 BRONCHIECTASIS, UNCOMPLICATED: Chronic | Status: ACTIVE | Noted: 2025-01-10

## 2025-01-31 PROBLEM — I48.91 UNSPECIFIED ATRIAL FIBRILLATION: Chronic | Status: ACTIVE | Noted: 2025-01-08

## 2025-01-31 PROBLEM — Z86.73 PERSONAL HISTORY OF TRANSIENT ISCHEMIC ATTACK (TIA), AND CEREBRAL INFARCTION WITHOUT RESIDUAL DEFICITS: Chronic | Status: ACTIVE | Noted: 2025-01-10

## 2025-01-31 RX ORDER — INSULIN ASPART 100 [IU]/ML
100 INJECTION, SOLUTION INTRAVENOUS; SUBCUTANEOUS
Qty: 2 | Refills: 0 | Status: ACTIVE | COMMUNITY
Start: 2025-01-31 | End: 1900-01-01

## 2025-02-01 ENCOUNTER — NON-APPOINTMENT (OUTPATIENT)
Age: 75
End: 2025-02-01

## 2025-02-02 ENCOUNTER — OUTPATIENT (OUTPATIENT)
Dept: OUTPATIENT SERVICES | Facility: HOSPITAL | Age: 75
LOS: 1 days | End: 2025-02-02
Payer: MEDICARE

## 2025-02-02 DIAGNOSIS — Z98.890 OTHER SPECIFIED POSTPROCEDURAL STATES: Chronic | ICD-10-CM

## 2025-02-02 DIAGNOSIS — Z98.89 OTHER SPECIFIED POSTPROCEDURAL STATES: Chronic | ICD-10-CM

## 2025-02-02 DIAGNOSIS — H05.352 EXOSTOSIS OF LEFT ORBIT: Chronic | ICD-10-CM

## 2025-02-02 DIAGNOSIS — Z95.2 PRESENCE OF PROSTHETIC HEART VALVE: Chronic | ICD-10-CM

## 2025-02-02 DIAGNOSIS — R50.9 FEVER, UNSPECIFIED: ICD-10-CM

## 2025-02-02 DIAGNOSIS — Z93.3 COLOSTOMY STATUS: Chronic | ICD-10-CM

## 2025-02-02 DIAGNOSIS — K62.5 HEMORRHAGE OF ANUS AND RECTUM: Chronic | ICD-10-CM

## 2025-02-02 DIAGNOSIS — Z96.653 PRESENCE OF ARTIFICIAL KNEE JOINT, BILATERAL: Chronic | ICD-10-CM

## 2025-02-02 DIAGNOSIS — Z87.09 PERSONAL HISTORY OF OTHER DISEASES OF THE RESPIRATORY SYSTEM: Chronic | ICD-10-CM

## 2025-02-02 DIAGNOSIS — R06.00 DYSPNEA, UNSPECIFIED: ICD-10-CM

## 2025-02-02 DIAGNOSIS — R05.9 COUGH, UNSPECIFIED: ICD-10-CM

## 2025-02-02 PROCEDURE — 71046 X-RAY EXAM CHEST 2 VIEWS: CPT

## 2025-02-03 ENCOUNTER — APPOINTMENT (OUTPATIENT)
Dept: INFECTIOUS DISEASE | Facility: CLINIC | Age: 75
End: 2025-02-03
Payer: MEDICARE

## 2025-02-03 VITALS
SYSTOLIC BLOOD PRESSURE: 112 MMHG | HEIGHT: 67 IN | DIASTOLIC BLOOD PRESSURE: 78 MMHG | TEMPERATURE: 98.5 F | BODY MASS INDEX: 24.57 KG/M2 | WEIGHT: 156.53 LBS | HEART RATE: 67 BPM | OXYGEN SATURATION: 94 %

## 2025-02-03 PROCEDURE — 99213 OFFICE O/P EST LOW 20 MIN: CPT

## 2025-02-04 LAB
RAPID RVP RESULT: NOT DETECTED
SARS-COV-2 RNA RESP QL NAA+PROBE: NOT DETECTED

## 2025-02-04 NOTE — PROGRESS NOTE ADULT - PROBLEM SELECTOR PROBLEM 8
[General Appearance - Alert] : alert [General Appearance - In No Acute Distress] : in no acute distress [Oriented To Time, Place, And Person] : oriented to person, place, and time [No Visual Abnormalities] : no visible abnormalities [Neck Appearance] : the appearance of the neck was normal [] : no respiratory distress [Heart Rate And Rhythm] : heart rate was normal and rhythm regular [Abnormal Walk] : normal gait Prophylactic measure Diabetes

## 2025-02-07 LAB
BACTERIA SPT CULT: ABNORMAL
BACTERIA SPT CULT: NORMAL
FUNGUS SPT CULT: ABNORMAL
FUNGUS SPT CULT: ABNORMAL

## 2025-02-07 RX ORDER — DOXYCYCLINE HYCLATE 100 MG/1
100 CAPSULE ORAL
Qty: 20 | Refills: 0 | Status: ACTIVE | COMMUNITY
Start: 2025-02-07 | End: 1900-01-01

## 2025-02-10 ENCOUNTER — RX RENEWAL (OUTPATIENT)
Age: 75
End: 2025-02-10

## 2025-02-11 NOTE — PATIENT PROFILE ADULT. - NS PRO CONTRA FLU CONTRAIND
Patient admitted for AKUA, Hyperkaelmia, acidosis in the setting of recent tacrolimus toxicity.     Cr trend : 1.6-->2.6 upon admission  Last tac level was 18 on 2/6 and was 19 on 2/3  K 6.3  HCO3=11  Na 127  pH from VBG  7.19    EKG @admission - no changes related to hyperkalemia  CXR @ admission - no rashawn pulmonary edema. Noted mild pulmonary congestion    Weight 107 kg, noted weight from last hospitalization was 113 kg    US Tx kidney 2/7- no hydronephrosis    PLAN    Medical treatment for hyperkalemia.   Calcium gluconate 2 gram  Bicarb 2 amps  Lokelma 10 g tid  Insulin/D50  Lasix 100 mg iv   HOLD Bactrim   HOLD tacrolimus  Transfer to SICU for close monitoring  Repeat ABG with K level, RFP, INR after med management. If K/acidosis doesn't improve, will need to place trialysis catheter and do HD.   Stent is still in.  Sent UA with reflex to culture, blood culture  Bladder scan for PVR  Empirical treatment with Vancomycin and Zosyn renally dose for leukocytosis WBC 16.8 (increased from 3-->-->15-->16.8 upon admission)    Discussed with Dr. Carrillo , tx surgery team attending    Discussed with Dr. Mcginnis, SICU attending.      none

## 2025-02-12 ENCOUNTER — RX RENEWAL (OUTPATIENT)
Age: 75
End: 2025-02-12

## 2025-02-12 ENCOUNTER — TRANSCRIPTION ENCOUNTER (OUTPATIENT)
Age: 75
End: 2025-02-12

## 2025-02-12 LAB
ACID FAST STN SPT: NORMAL
ACID FAST STN SPT: NORMAL

## 2025-02-12 RX ORDER — LEVETIRACETAM 500 MG/1
500 TABLET, FILM COATED ORAL
Qty: 60 | Refills: 5 | Status: DISCONTINUED | COMMUNITY
Start: 2025-02-12 | End: 2025-02-12

## 2025-02-14 NOTE — DISCHARGE NOTE PROVIDER - YES NO FOR MLM POSITIVE OR NEGATIVE COVID RESULT
----- Message from Jennifer sent at 2/14/2025 10:34 AM CST -----  .Type:  Patient Returning Call    Who Called:[facility]  Who Left Message for Patient:Gricelda with [employer]  Does the patient know what this is regarding?:DULoxetine (CYMBALTA) 30 MG  Would the patient rather a call back or a response via [facility]?    Best Call Back Number:[phone]  Additional Information: Please call [facility] calling about DULoxetine (CYMBALTA) 30 MG capsule called back line no answer  
Confirmed rx increased to BID per Mary Sumner NP office visit note, pharmacist verbalized understanding.  
.

## 2025-02-18 NOTE — PHYSICAL EXAM
Detail Level: Detailed Add 58044 Cpt? (Important Note: In 2017 The Use Of 33084 Is Being Tracked By Cms To Determine Future Global Period Reimbursement For Global Periods): yes [No Acute Distress] : no acute distress [Normal Oropharynx] : normal oropharynx [III] : Mallampati Class: III [Normal Appearance] : normal appearance [No Neck Mass] : no neck mass [Normal Rate/Rhythm] : normal rate/rhythm [Murmur ___ / 6] : murmur [unfilled] / 6 [Normal S1, S2] : normal s1, s2 [No Resp Distress] : no resp distress [Clear to Auscultation Bilaterally] : clear to auscultation bilaterally [No Abnormalities] : no abnormalities [Benign] : benign [Normal Gait] : normal gait [No Clubbing] : no clubbing [No Cyanosis] : no cyanosis [FROM] : FROM [Normal Color/ Pigmentation] : normal color/ pigmentation [No Focal Deficits] : no focal deficits [Oriented x3] : oriented x3 [Normal Affect] : normal affect [TextBox_2] : wheelchair [TextBox_54] : 2/6 systolic murmur  [TextBox_68] : I:E 1:3;  expiratory wheezes and rhonchi b/l [TextBox_105] : venous stasis changes, Right leg LE wound

## 2025-02-20 ENCOUNTER — INPATIENT (INPATIENT)
Facility: HOSPITAL | Age: 75
LOS: 0 days | Discharge: HOME | DRG: 189 | End: 2025-02-21
Attending: INTERNAL MEDICINE | Admitting: INTERNAL MEDICINE
Payer: MEDICARE

## 2025-02-20 VITALS
HEIGHT: 67 IN | HEART RATE: 64 BPM | WEIGHT: 156.09 LBS | TEMPERATURE: 97 F | DIASTOLIC BLOOD PRESSURE: 74 MMHG | SYSTOLIC BLOOD PRESSURE: 145 MMHG | OXYGEN SATURATION: 92 % | RESPIRATION RATE: 20 BRPM

## 2025-02-20 DIAGNOSIS — I10 ESSENTIAL (PRIMARY) HYPERTENSION: ICD-10-CM

## 2025-02-20 DIAGNOSIS — I48.20 CHRONIC ATRIAL FIBRILLATION, UNSPECIFIED: ICD-10-CM

## 2025-02-20 DIAGNOSIS — G40.909 EPILEPSY, UNSPECIFIED, NOT INTRACTABLE, WITHOUT STATUS EPILEPTICUS: ICD-10-CM

## 2025-02-20 DIAGNOSIS — R33.9 RETENTION OF URINE, UNSPECIFIED: ICD-10-CM

## 2025-02-20 DIAGNOSIS — Z98.89 OTHER SPECIFIED POSTPROCEDURAL STATES: Chronic | ICD-10-CM

## 2025-02-20 DIAGNOSIS — R50.9 FEVER, UNSPECIFIED: ICD-10-CM

## 2025-02-20 DIAGNOSIS — J44.9 CHRONIC OBSTRUCTIVE PULMONARY DISEASE, UNSPECIFIED: ICD-10-CM

## 2025-02-20 DIAGNOSIS — J96.01 ACUTE RESPIRATORY FAILURE WITH HYPOXIA: ICD-10-CM

## 2025-02-20 DIAGNOSIS — Z95.2 PRESENCE OF PROSTHETIC HEART VALVE: Chronic | ICD-10-CM

## 2025-02-20 DIAGNOSIS — Z86.73 PERSONAL HISTORY OF TRANSIENT ISCHEMIC ATTACK (TIA), AND CEREBRAL INFARCTION WITHOUT RESIDUAL DEFICITS: ICD-10-CM

## 2025-02-20 DIAGNOSIS — Z98.890 OTHER SPECIFIED POSTPROCEDURAL STATES: Chronic | ICD-10-CM

## 2025-02-20 DIAGNOSIS — K62.5 HEMORRHAGE OF ANUS AND RECTUM: Chronic | ICD-10-CM

## 2025-02-20 DIAGNOSIS — Z93.3 COLOSTOMY STATUS: Chronic | ICD-10-CM

## 2025-02-20 DIAGNOSIS — H05.352 EXOSTOSIS OF LEFT ORBIT: Chronic | ICD-10-CM

## 2025-02-20 DIAGNOSIS — Z29.9 ENCOUNTER FOR PROPHYLACTIC MEASURES, UNSPECIFIED: ICD-10-CM

## 2025-02-20 DIAGNOSIS — J45.909 UNSPECIFIED ASTHMA, UNCOMPLICATED: ICD-10-CM

## 2025-02-20 DIAGNOSIS — E11.9 TYPE 2 DIABETES MELLITUS WITHOUT COMPLICATIONS: ICD-10-CM

## 2025-02-20 DIAGNOSIS — Z96.653 PRESENCE OF ARTIFICIAL KNEE JOINT, BILATERAL: Chronic | ICD-10-CM

## 2025-02-20 DIAGNOSIS — Z87.09 PERSONAL HISTORY OF OTHER DISEASES OF THE RESPIRATORY SYSTEM: Chronic | ICD-10-CM

## 2025-02-20 DIAGNOSIS — G62.9 POLYNEUROPATHY, UNSPECIFIED: ICD-10-CM

## 2025-02-20 LAB
ALBUMIN SERPL ELPH-MCNC: 3 G/DL — LOW (ref 3.3–5)
ALP SERPL-CCNC: 63 U/L — SIGNIFICANT CHANGE UP (ref 40–120)
ALT FLD-CCNC: 17 U/L — SIGNIFICANT CHANGE UP (ref 12–78)
ANION GAP SERPL CALC-SCNC: 5 MMOL/L — SIGNIFICANT CHANGE UP (ref 5–17)
APPEARANCE UR: CLEAR — SIGNIFICANT CHANGE UP
APTT BLD: 34.7 SEC — SIGNIFICANT CHANGE UP (ref 24.5–35.6)
AST SERPL-CCNC: 16 U/L — SIGNIFICANT CHANGE UP (ref 15–37)
BASOPHILS # BLD AUTO: 0.02 K/UL — SIGNIFICANT CHANGE UP (ref 0–0.2)
BASOPHILS NFR BLD AUTO: 0.2 % — SIGNIFICANT CHANGE UP (ref 0–2)
BILIRUB SERPL-MCNC: 0.5 MG/DL — SIGNIFICANT CHANGE UP (ref 0.2–1.2)
BILIRUB UR-MCNC: NEGATIVE — SIGNIFICANT CHANGE UP
BUN SERPL-MCNC: 15 MG/DL — SIGNIFICANT CHANGE UP (ref 7–23)
CALCIUM SERPL-MCNC: 8.8 MG/DL — SIGNIFICANT CHANGE UP (ref 8.5–10.1)
CHLORIDE SERPL-SCNC: 106 MMOL/L — SIGNIFICANT CHANGE UP (ref 96–108)
CO2 SERPL-SCNC: 28 MMOL/L — SIGNIFICANT CHANGE UP (ref 22–31)
COLOR SPEC: YELLOW — SIGNIFICANT CHANGE UP
CREAT SERPL-MCNC: 0.77 MG/DL — SIGNIFICANT CHANGE UP (ref 0.5–1.3)
DIFF PNL FLD: NEGATIVE — SIGNIFICANT CHANGE UP
EGFR: 80 ML/MIN/1.73M2 — SIGNIFICANT CHANGE UP
EOSINOPHIL # BLD AUTO: 0.04 K/UL — SIGNIFICANT CHANGE UP (ref 0–0.5)
EOSINOPHIL NFR BLD AUTO: 0.4 % — SIGNIFICANT CHANGE UP (ref 0–6)
FLUAV AG NPH QL: SIGNIFICANT CHANGE UP
FLUBV AG NPH QL: SIGNIFICANT CHANGE UP
GLUCOSE BLDC GLUCOMTR-MCNC: 101 MG/DL — HIGH (ref 70–99)
GLUCOSE BLDC GLUCOMTR-MCNC: 150 MG/DL — HIGH (ref 70–99)
GLUCOSE BLDC GLUCOMTR-MCNC: 167 MG/DL — HIGH (ref 70–99)
GLUCOSE BLDC GLUCOMTR-MCNC: 180 MG/DL — HIGH (ref 70–99)
GLUCOSE BLDC GLUCOMTR-MCNC: 52 MG/DL — CRITICAL LOW (ref 70–99)
GLUCOSE BLDC GLUCOMTR-MCNC: 54 MG/DL — CRITICAL LOW (ref 70–99)
GLUCOSE BLDC GLUCOMTR-MCNC: 63 MG/DL — LOW (ref 70–99)
GLUCOSE BLDC GLUCOMTR-MCNC: 70 MG/DL — SIGNIFICANT CHANGE UP (ref 70–99)
GLUCOSE BLDC GLUCOMTR-MCNC: 74 MG/DL — SIGNIFICANT CHANGE UP (ref 70–99)
GLUCOSE BLDC GLUCOMTR-MCNC: 77 MG/DL — SIGNIFICANT CHANGE UP (ref 70–99)
GLUCOSE SERPL-MCNC: 116 MG/DL — HIGH (ref 70–99)
GLUCOSE UR QL: NEGATIVE MG/DL — SIGNIFICANT CHANGE UP
HCT VFR BLD CALC: 37.8 % — SIGNIFICANT CHANGE UP (ref 34.5–45)
HGB BLD-MCNC: 11.8 G/DL — SIGNIFICANT CHANGE UP (ref 11.5–15.5)
IMM GRANULOCYTES NFR BLD AUTO: 0.5 % — SIGNIFICANT CHANGE UP (ref 0–0.9)
INR BLD: 1.46 RATIO — HIGH (ref 0.85–1.16)
KETONES UR-MCNC: NEGATIVE MG/DL — SIGNIFICANT CHANGE UP
LACTATE SERPL-SCNC: 1.3 MMOL/L — SIGNIFICANT CHANGE UP (ref 0.7–2)
LEUKOCYTE ESTERASE UR-ACNC: NEGATIVE — SIGNIFICANT CHANGE UP
LYMPHOCYTES # BLD AUTO: 1.83 K/UL — SIGNIFICANT CHANGE UP (ref 1–3.3)
LYMPHOCYTES # BLD AUTO: 17.3 % — SIGNIFICANT CHANGE UP (ref 13–44)
MCHC RBC-ENTMCNC: 24.2 PG — LOW (ref 27–34)
MCHC RBC-ENTMCNC: 31.2 G/DL — LOW (ref 32–36)
MCV RBC AUTO: 77.5 FL — LOW (ref 80–100)
MONOCYTES # BLD AUTO: 1.64 K/UL — HIGH (ref 0–0.9)
MONOCYTES NFR BLD AUTO: 15.5 % — HIGH (ref 2–14)
NEUTROPHILS # BLD AUTO: 7 K/UL — SIGNIFICANT CHANGE UP (ref 1.8–7.4)
NEUTROPHILS NFR BLD AUTO: 66.1 % — SIGNIFICANT CHANGE UP (ref 43–77)
NITRITE UR-MCNC: NEGATIVE — SIGNIFICANT CHANGE UP
NRBC BLD AUTO-RTO: 0 /100 WBCS — SIGNIFICANT CHANGE UP (ref 0–0)
NT-PROBNP SERPL-SCNC: 1896 PG/ML — HIGH (ref 0–450)
PH UR: 6.5 — SIGNIFICANT CHANGE UP (ref 5–8)
PLATELET # BLD AUTO: 200 K/UL — SIGNIFICANT CHANGE UP (ref 150–400)
POTASSIUM SERPL-MCNC: 3.8 MMOL/L — SIGNIFICANT CHANGE UP (ref 3.5–5.3)
POTASSIUM SERPL-SCNC: 3.8 MMOL/L — SIGNIFICANT CHANGE UP (ref 3.5–5.3)
PROCALCITONIN SERPL-MCNC: 0.08 NG/ML — SIGNIFICANT CHANGE UP
PROT SERPL-MCNC: 6.5 G/DL — SIGNIFICANT CHANGE UP (ref 6–8.3)
PROT UR-MCNC: 30 MG/DL
PROTHROM AB SERPL-ACNC: 17.2 SEC — HIGH (ref 9.9–13.4)
RBC # BLD: 4.88 M/UL — SIGNIFICANT CHANGE UP (ref 3.8–5.2)
RBC # FLD: 18.2 % — HIGH (ref 10.3–14.5)
RSV RNA NPH QL NAA+NON-PROBE: SIGNIFICANT CHANGE UP
SARS-COV-2 RNA SPEC QL NAA+PROBE: SIGNIFICANT CHANGE UP
SODIUM SERPL-SCNC: 139 MMOL/L — SIGNIFICANT CHANGE UP (ref 135–145)
SP GR SPEC: 1.03 — SIGNIFICANT CHANGE UP (ref 1–1.03)
TROPONIN I, HIGH SENSITIVITY RESULT: 28.2 NG/L — SIGNIFICANT CHANGE UP
UROBILINOGEN FLD QL: 1 MG/DL — SIGNIFICANT CHANGE UP (ref 0.2–1)
WBC # BLD: 10.58 K/UL — HIGH (ref 3.8–10.5)
WBC # FLD AUTO: 10.58 K/UL — HIGH (ref 3.8–10.5)

## 2025-02-20 PROCEDURE — 71275 CT ANGIOGRAPHY CHEST: CPT | Mod: 26

## 2025-02-20 PROCEDURE — 99223 1ST HOSP IP/OBS HIGH 75: CPT

## 2025-02-20 PROCEDURE — 93010 ELECTROCARDIOGRAM REPORT: CPT

## 2025-02-20 PROCEDURE — G0545: CPT

## 2025-02-20 PROCEDURE — 74176 CT ABD & PELVIS W/O CONTRAST: CPT | Mod: 26,59

## 2025-02-20 PROCEDURE — 71045 X-RAY EXAM CHEST 1 VIEW: CPT | Mod: 26

## 2025-02-20 PROCEDURE — 99222 1ST HOSP IP/OBS MODERATE 55: CPT

## 2025-02-20 PROCEDURE — 99285 EMERGENCY DEPT VISIT HI MDM: CPT

## 2025-02-20 PROCEDURE — 74177 CT ABD & PELVIS W/CONTRAST: CPT | Mod: 26

## 2025-02-20 RX ORDER — FERROUS SULFATE 137(45) MG
325 TABLET, EXTENDED RELEASE ORAL DAILY
Refills: 0 | Status: DISCONTINUED | OUTPATIENT
Start: 2025-02-20 | End: 2025-02-21

## 2025-02-20 RX ORDER — DIPHENHYDRAMINE HCL 12.5MG/5ML
25 ELIXIR ORAL ONCE
Refills: 0 | Status: COMPLETED | OUTPATIENT
Start: 2025-02-20 | End: 2025-02-20

## 2025-02-20 RX ORDER — LABETALOL HYDROCHLORIDE 200 MG/1
100 TABLET, FILM COATED ORAL EVERY 8 HOURS
Refills: 0 | Status: DISCONTINUED | OUTPATIENT
Start: 2025-02-20 | End: 2025-02-21

## 2025-02-20 RX ORDER — MEGESTROL ACETATE 40 MG
20 TABLET ORAL DAILY
Refills: 0 | Status: DISCONTINUED | OUTPATIENT
Start: 2025-02-20 | End: 2025-02-21

## 2025-02-20 RX ORDER — SODIUM CHLORIDE 9 G/1000ML
1000 INJECTION, SOLUTION INTRAVENOUS
Refills: 0 | Status: DISCONTINUED | OUTPATIENT
Start: 2025-02-20 | End: 2025-02-21

## 2025-02-20 RX ORDER — SENNA 187 MG
2 TABLET ORAL AT BEDTIME
Refills: 0 | Status: DISCONTINUED | OUTPATIENT
Start: 2025-02-20 | End: 2025-02-21

## 2025-02-20 RX ORDER — ERTAPENEM SODIUM 1 G/1
1000 INJECTION, POWDER, LYOPHILIZED, FOR SOLUTION INTRAMUSCULAR; INTRAVENOUS ONCE
Refills: 0 | Status: COMPLETED | OUTPATIENT
Start: 2025-02-20 | End: 2025-02-20

## 2025-02-20 RX ORDER — GLUCAGON 3 MG/1
1 POWDER NASAL ONCE
Refills: 0 | Status: DISCONTINUED | OUTPATIENT
Start: 2025-02-20 | End: 2025-02-21

## 2025-02-20 RX ORDER — MEXILETINE HYDROCHLORIDE 250 MG/1
200 CAPSULE ORAL THREE TIMES A DAY
Refills: 0 | Status: DISCONTINUED | OUTPATIENT
Start: 2025-02-20 | End: 2025-02-20

## 2025-02-20 RX ORDER — BUDESONIDE 0.25 MG/2ML
0.5 SUSPENSION RESPIRATORY (INHALATION) DAILY
Refills: 0 | Status: DISCONTINUED | OUTPATIENT
Start: 2025-02-20 | End: 2025-02-21

## 2025-02-20 RX ORDER — ALBUTEROL SULFATE 2.5 MG/3ML
2 VIAL, NEBULIZER (ML) INHALATION EVERY 6 HOURS
Refills: 0 | Status: DISCONTINUED | OUTPATIENT
Start: 2025-02-20 | End: 2025-02-21

## 2025-02-20 RX ORDER — ENSIFENTRINE 3 MG/2.5ML
3 SUSPENSION RESPIRATORY (INHALATION)
Refills: 0 | DISCHARGE

## 2025-02-20 RX ORDER — DEXTROSE 50 % IN WATER 50 %
12.5 SYRINGE (ML) INTRAVENOUS ONCE
Refills: 0 | Status: DISCONTINUED | OUTPATIENT
Start: 2025-02-20 | End: 2025-02-21

## 2025-02-20 RX ORDER — IPRATROPIUM BROMIDE AND ALBUTEROL SULFATE .5; 2.5 MG/3ML; MG/3ML
3 SOLUTION RESPIRATORY (INHALATION) EVERY 6 HOURS
Refills: 0 | Status: DISCONTINUED | OUTPATIENT
Start: 2025-02-20 | End: 2025-02-21

## 2025-02-20 RX ORDER — GABAPENTIN 400 MG/1
200 CAPSULE ORAL THREE TIMES A DAY
Refills: 0 | Status: DISCONTINUED | OUTPATIENT
Start: 2025-02-20 | End: 2025-02-21

## 2025-02-20 RX ORDER — NIFEDIPINE 30 MG
30 TABLET, EXTENDED RELEASE 24 HR ORAL DAILY
Refills: 0 | Status: DISCONTINUED | OUTPATIENT
Start: 2025-02-20 | End: 2025-02-21

## 2025-02-20 RX ORDER — DEXTROSE 50 % IN WATER 50 %
25 SYRINGE (ML) INTRAVENOUS ONCE
Refills: 0 | Status: DISCONTINUED | OUTPATIENT
Start: 2025-02-20 | End: 2025-02-21

## 2025-02-20 RX ORDER — INSULIN LISPRO 100 U/ML
INJECTION, SOLUTION INTRAVENOUS; SUBCUTANEOUS
Refills: 0 | Status: DISCONTINUED | OUTPATIENT
Start: 2025-02-20 | End: 2025-02-21

## 2025-02-20 RX ORDER — METHYLPREDNISOLONE ACETATE 80 MG/ML
1 INJECTION, SUSPENSION INTRA-ARTICULAR; INTRALESIONAL; INTRAMUSCULAR; SOFT TISSUE
Qty: 0 | Refills: 0 | DISCHARGE

## 2025-02-20 RX ORDER — POLYETHYLENE GLYCOL 3350 17 G/17G
17 POWDER, FOR SOLUTION ORAL DAILY
Refills: 0 | Status: DISCONTINUED | OUTPATIENT
Start: 2025-02-20 | End: 2025-02-21

## 2025-02-20 RX ORDER — LEVETIRACETAM 10 MG/ML
1000 INJECTION, SOLUTION INTRAVENOUS
Refills: 0 | Status: DISCONTINUED | OUTPATIENT
Start: 2025-02-20 | End: 2025-02-21

## 2025-02-20 RX ORDER — SERTRALINE 100 MG/1
50 TABLET, FILM COATED ORAL DAILY
Refills: 0 | Status: DISCONTINUED | OUTPATIENT
Start: 2025-02-20 | End: 2025-02-21

## 2025-02-20 RX ORDER — MONTELUKAST SODIUM 10 MG/1
10 TABLET ORAL DAILY
Refills: 0 | Status: DISCONTINUED | OUTPATIENT
Start: 2025-02-20 | End: 2025-02-21

## 2025-02-20 RX ORDER — DEXTROSE 50 % IN WATER 50 %
15 SYRINGE (ML) INTRAVENOUS ONCE
Refills: 0 | Status: DISCONTINUED | OUTPATIENT
Start: 2025-02-20 | End: 2025-02-21

## 2025-02-20 RX ORDER — ROSUVASTATIN CALCIUM 20 MG/1
5 TABLET, FILM COATED ORAL AT BEDTIME
Refills: 0 | Status: DISCONTINUED | OUTPATIENT
Start: 2025-02-20 | End: 2025-02-21

## 2025-02-20 RX ORDER — GLYCOPYRROLATE 0.2 MG/ML
1 INJECTION INTRAMUSCULAR; INTRAVENOUS THREE TIMES A DAY
Refills: 0 | Status: DISCONTINUED | OUTPATIENT
Start: 2025-02-20 | End: 2025-02-21

## 2025-02-20 RX ORDER — APIXABAN 2.5 MG/1
5 TABLET, FILM COATED ORAL EVERY 12 HOURS
Refills: 0 | Status: DISCONTINUED | OUTPATIENT
Start: 2025-02-20 | End: 2025-02-21

## 2025-02-20 RX ORDER — METHYLPREDNISOLONE ACETATE 80 MG/ML
8 INJECTION, SUSPENSION INTRA-ARTICULAR; INTRALESIONAL; INTRAMUSCULAR; SOFT TISSUE
Refills: 0 | Status: DISCONTINUED | OUTPATIENT
Start: 2025-02-20 | End: 2025-02-21

## 2025-02-20 RX ORDER — MINERAL OIL
30 OIL (ML) MISCELLANEOUS DAILY
Refills: 0 | Status: DISCONTINUED | OUTPATIENT
Start: 2025-02-20 | End: 2025-02-21

## 2025-02-20 RX ORDER — MEXILETINE HYDROCHLORIDE 250 MG/1
200 CAPSULE ORAL THREE TIMES A DAY
Refills: 0 | Status: DISCONTINUED | OUTPATIENT
Start: 2025-02-20 | End: 2025-02-21

## 2025-02-20 RX ORDER — INSULIN GLARGINE-YFGN 100 [IU]/ML
25 INJECTION, SOLUTION SUBCUTANEOUS AT BEDTIME
Refills: 0 | Status: DISCONTINUED | OUTPATIENT
Start: 2025-02-20 | End: 2025-02-21

## 2025-02-20 RX ORDER — ERTAPENEM SODIUM 1 G/1
1000 INJECTION, POWDER, LYOPHILIZED, FOR SOLUTION INTRAMUSCULAR; INTRAVENOUS EVERY 24 HOURS
Refills: 0 | Status: DISCONTINUED | OUTPATIENT
Start: 2025-02-21 | End: 2025-02-20

## 2025-02-20 RX ORDER — CLOPIDOGREL BISULFATE 75 MG/1
75 TABLET, FILM COATED ORAL DAILY
Refills: 0 | Status: DISCONTINUED | OUTPATIENT
Start: 2025-02-20 | End: 2025-02-21

## 2025-02-20 RX ORDER — ACETAMINOPHEN 500 MG/5ML
1000 LIQUID (ML) ORAL ONCE
Refills: 0 | Status: COMPLETED | OUTPATIENT
Start: 2025-02-20 | End: 2025-02-20

## 2025-02-20 RX ADMIN — IPRATROPIUM BROMIDE AND ALBUTEROL SULFATE 3 MILLILITER(S): .5; 2.5 SOLUTION RESPIRATORY (INHALATION) at 08:10

## 2025-02-20 RX ADMIN — LABETALOL HYDROCHLORIDE 100 MILLIGRAM(S): 200 TABLET, FILM COATED ORAL at 14:35

## 2025-02-20 RX ADMIN — Medication 2 TABLET(S): at 21:59

## 2025-02-20 RX ADMIN — LEVETIRACETAM 1000 MILLIGRAM(S): 10 INJECTION, SOLUTION INTRAVENOUS at 17:45

## 2025-02-20 RX ADMIN — GLYCOPYRROLATE 1 MILLIGRAM(S): 0.2 INJECTION INTRAMUSCULAR; INTRAVENOUS at 14:35

## 2025-02-20 RX ADMIN — GABAPENTIN 200 MILLIGRAM(S): 400 CAPSULE ORAL at 14:35

## 2025-02-20 RX ADMIN — METHYLPREDNISOLONE ACETATE 8 MILLIGRAM(S): 80 INJECTION, SUSPENSION INTRA-ARTICULAR; INTRALESIONAL; INTRAMUSCULAR; SOFT TISSUE at 17:45

## 2025-02-20 RX ADMIN — MEXILETINE HYDROCHLORIDE 200 MILLIGRAM(S): 250 CAPSULE ORAL at 21:59

## 2025-02-20 RX ADMIN — GABAPENTIN 200 MILLIGRAM(S): 400 CAPSULE ORAL at 21:59

## 2025-02-20 RX ADMIN — Medication 20 MILLIGRAM(S): at 14:34

## 2025-02-20 RX ADMIN — MONTELUKAST SODIUM 10 MILLIGRAM(S): 10 TABLET ORAL at 14:35

## 2025-02-20 RX ADMIN — APIXABAN 5 MILLIGRAM(S): 2.5 TABLET, FILM COATED ORAL at 17:45

## 2025-02-20 RX ADMIN — SERTRALINE 50 MILLIGRAM(S): 100 TABLET, FILM COATED ORAL at 14:36

## 2025-02-20 RX ADMIN — Medication 40 MILLIGRAM(S): at 08:05

## 2025-02-20 RX ADMIN — ROSUVASTATIN CALCIUM 5 MILLIGRAM(S): 20 TABLET, FILM COATED ORAL at 21:59

## 2025-02-20 RX ADMIN — GLYCOPYRROLATE 1 MILLIGRAM(S): 0.2 INJECTION INTRAMUSCULAR; INTRAVENOUS at 21:59

## 2025-02-20 RX ADMIN — METHYLPREDNISOLONE ACETATE 8 MILLIGRAM(S): 80 INJECTION, SUSPENSION INTRA-ARTICULAR; INTRALESIONAL; INTRAMUSCULAR; SOFT TISSUE at 06:42

## 2025-02-20 RX ADMIN — Medication 1000 MILLIGRAM(S): at 02:48

## 2025-02-20 RX ADMIN — LABETALOL HYDROCHLORIDE 100 MILLIGRAM(S): 200 TABLET, FILM COATED ORAL at 21:59

## 2025-02-20 RX ADMIN — Medication 325 MILLIGRAM(S): at 14:36

## 2025-02-20 RX ADMIN — Medication 400 MILLIGRAM(S): at 01:48

## 2025-02-20 RX ADMIN — LEVETIRACETAM 1000 MILLIGRAM(S): 10 INJECTION, SOLUTION INTRAVENOUS at 06:41

## 2025-02-20 RX ADMIN — INSULIN GLARGINE-YFGN 25 UNIT(S): 100 INJECTION, SOLUTION SUBCUTANEOUS at 22:03

## 2025-02-20 RX ADMIN — APIXABAN 5 MILLIGRAM(S): 2.5 TABLET, FILM COATED ORAL at 06:40

## 2025-02-20 RX ADMIN — ERTAPENEM SODIUM 100 MILLIGRAM(S): 1 INJECTION, POWDER, LYOPHILIZED, FOR SOLUTION INTRAMUSCULAR; INTRAVENOUS at 02:12

## 2025-02-20 RX ADMIN — CLOPIDOGREL BISULFATE 75 MILLIGRAM(S): 75 TABLET, FILM COATED ORAL at 14:35

## 2025-02-20 RX ADMIN — IPRATROPIUM BROMIDE AND ALBUTEROL SULFATE 3 MILLILITER(S): .5; 2.5 SOLUTION RESPIRATORY (INHALATION) at 14:22

## 2025-02-20 RX ADMIN — Medication 1000 MILLILITER(S): at 01:48

## 2025-02-20 RX ADMIN — INSULIN LISPRO 1: 100 INJECTION, SOLUTION INTRAVENOUS; SUBCUTANEOUS at 12:52

## 2025-02-20 RX ADMIN — IPRATROPIUM BROMIDE AND ALBUTEROL SULFATE 3 MILLILITER(S): .5; 2.5 SOLUTION RESPIRATORY (INHALATION) at 19:12

## 2025-02-20 RX ADMIN — Medication 25 MILLIGRAM(S): at 02:05

## 2025-02-20 RX ADMIN — MEXILETINE HYDROCHLORIDE 200 MILLIGRAM(S): 250 CAPSULE ORAL at 14:34

## 2025-02-20 RX ADMIN — GABAPENTIN 200 MILLIGRAM(S): 400 CAPSULE ORAL at 06:42

## 2025-02-20 NOTE — CONSULT NOTE ADULT - ASSESSMENT
The patient is 73 y/o woman with PMH of epilepsy, COPD, DM, bronchiectasis, tracheomalacia s/p tracheloplasty, HTN, hx of dissection of descending thoracic aorta, hx of aortic aneurysm, type B dissection (7/2024), type A dissection s/p bio AVR with Bentall procedure (9/2022), severe AS s/p TAVR (9/10/2023), TIA's, Afib on Eliquis, colon cancer s/p colostomy, who presents to the ED for fever. Patient reports having cough for past 2-3 days, associated with fever. Patient endorses increased weakness and fatigue. Patient has hx of recurrent PNA recently admitted to [facility] for COPD exacerbation. Currently on Doxycycline prescribed by PCP. Afebrile in ED. Work up shows: WBC 10.58, Cr 0.77, Lactate 1.3, Procalcitonin 0.08. CTA shows no parenchymal infiltration or consolidation. No intra-abdominal abscess or evidence of enterocolitis. Known type A aortic dissection. Started on ertapenem empirically. ID consulted for workup and antibiotic management     2/20: Afebrile here. Reportedly, patient had fever and hypoxia at home. Not able to provide much history due to being lethargic, goes back to sleep while talking. Comfortable on room air. No leukocytosis.  RVP negative. Procal negative. Elevated Pro BNP. QuantiFeron checked 01/08/25 was negative. Prior sputum and blood cultures negative. CTA chest images and report reviewed and compared with her prior imaging. She has had many admissions with COPD exacerbations, possible pneumonia.   - Noted numerous allergies listed to PCN, meropenem, cefepime etc. Known hx of aortic aneurysm and dissection. (contra-indication to FQs). Seizure disorder - caution with carbapenems needed.    Impression  # COPD exacerbation  # r/o pneumonia  # Allergic to medications    Recommendations:  -Would favor to monitor off of antibiotics  -Can do a short course of zithromax (tolerated in past)  -Follow blood and sputum cultures  -Obtain MRSA screen, urine legionella and pneumococcal Ag  -Consider pulmonary eval for known COPD/bronchiectasis  -Monitor white count and temp curve  -Supplemental oxygen as needed to maintain SpO2 88-92%    ID will follow  Discussed with primary team    More Soto MD  Attending Physician  Division of Infectious Diseases   Available via [employer] Teams

## 2025-02-20 NOTE — ED ADULT NURSE NOTE - NSFALLHARMRISKINTERV_ED_ALL_ED

## 2025-02-20 NOTE — ED PROVIDER NOTE - CARE PLAN
1 Principal Discharge DX:	Acute hypoxic respiratory failure  Secondary Diagnosis:	Fever and chills

## 2025-02-20 NOTE — PHYSICAL THERAPY INITIAL EVALUATION ADULT - PERTINENT HX OF CURRENT PROBLEM, REHAB EVAL
74yFemale pmhx of Epilepsy on Keppra, COPD,, DM2  asthma on chronic steroids, bronchiectasis, tracheomalacia s/p tracheloplasty HTN, Hx of dissection of descending thoracic aorta, , hx of aortic aneurysm,  Type B 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  Evaluated by Dr. Antonio,  Type A dissection s/p bioAVR with Bental procedure (9/2022), severe AS s/p TAVR (9/10/2023), TIA's, DVT Afib on Eliquis, T2DM, Colon cancer, neuropathy, presents to the ED for fever. Pt reports having past 2-3 days of cough associated with fever. Patient endorses increased weakness and fatigue. Patient has hx of recurrent PNA recently admitted for COPD exacerabation. Currently on Doxycycline prescribed by PCP

## 2025-02-20 NOTE — ED PROVIDER NOTE - PHYSICAL EXAMINATION
Gen: awake alert , chronically ill appearing  HEENT: normal conjunctiva, oral mucosa moist  Lung: CTAB, no respiratory distress, no wheezes/rhonchi/rales B/L, speaking in full sentences  CV: RRR  Abd: soft, NT, ileostomy in place, ND, no guarding, no rigidity, no rebound tenderness  MSK: no visible deformities  Skin: Warm, well perfused

## 2025-02-20 NOTE — PATIENT PROFILE ADULT - FUNCTIONAL ASSESSMENT - BASIC MOBILITY 6.
4-calculated by average/Not able to assess (calculate score using [facility] averaging method)

## 2025-02-20 NOTE — ED ADULT NURSE NOTE - BREATH SOUNDS, MLM
[No Acute Distress] : no acute distress [Well Nourished] : well nourished [Well Developed] : well developed [Well-Appearing] : well-appearing [Normal Sclera/Conjunctiva] : normal sclera/conjunctiva [PERRL] : pupils equal round and reactive to light [EOMI] : extraocular movements intact [Normal Outer Ear/Nose] : the outer ears and nose were normal in appearance [Normal Oropharynx] : the oropharynx was normal [No JVD] : no jugular venous distention [Supple] : supple [No Lymphadenopathy] : no lymphadenopathy [Thyroid Normal, No Nodules] : the thyroid was normal and there were no nodules present [No Respiratory Distress] : no respiratory distress  [Clear to Auscultation] : lungs were clear to auscultation bilaterally [No Accessory Muscle Use] : no accessory muscle use [Normal Rate] : normal rate  [Regular Rhythm] : with a regular rhythm [Normal S1, S2] : normal S1 and S2 [No Murmur] : no murmur heard [No Carotid Bruits] : no carotid bruits [No Abdominal Bruit] : a ~M bruit was not heard ~T in the abdomen [No Varicosities] : no varicosities [Pedal Pulses Present] : the pedal pulses are present [No Edema] : there was no peripheral edema [No Extremity Clubbing/Cyanosis] : no extremity clubbing/cyanosis [No Palpable Aorta] : no palpable aorta [Soft] : abdomen soft [Non Tender] : non-tender [Non-distended] : non-distended [No Masses] : no abdominal mass palpated [No HSM] : no HSM [Normal Bowel Sounds] : normal bowel sounds [Normal Posterior Cervical Nodes] : no posterior cervical lymphadenopathy [Normal Anterior Cervical Nodes] : no anterior cervical lymphadenopathy [No CVA Tenderness] : no CVA  tenderness [No Spinal Tenderness] : no spinal tenderness [No Joint Swelling] : no joint swelling [Grossly Normal Strength/Tone] : grossly normal strength/tone [No Rash] : no rash [Normal Gait] : normal gait [Coordination Grossly Intact] : coordination grossly intact [No Focal Deficits] : no focal deficits [Deep Tendon Reflexes (DTR)] : deep tendon reflexes were 2+ and symmetric [Normal Affect] : the affect was normal [Normal Insight/Judgement] : insight and judgment were intact Clear

## 2025-02-20 NOTE — PHYSICAL THERAPY INITIAL EVALUATION ADULT - ADDITIONAL COMMENTS
AS per patient. Patient lives alone in private home with no stairs to negotiate. Patient has 24/7 HHA who assist with all ADLS and functional mobility. Patient has rollator at home, however patient reports she ambulates with handheld assist from aide more then utilizing the rollator

## 2025-02-20 NOTE — CHART NOTE - NSCHARTNOTEFT_GEN_A_CORE
Consult request received for abdominal discomfort/ pain and distension.    Extensive surgical history including APR for cancer reviewed/ noted with permanent LLQ stoma creation.    Abdomen quite distended, but softly so without guarding/ rebound/ referred pain.    LLQ stoma patent with air in appliance.  Digitally explored:  -admits 2 cm finger  -no formed stool within SQ reservoir  -admitting finger below fascia into abdomen    Labs reassuring with minimal leukocytosis without shift and no acidosis by chemistry.    CT:  -Colonic distention with fecal retention; no small bowel obstruction.    Imp/ plan-    Constipation/ fecal retention s/p APR.  Clinically, needs aggressive bowel regimen.  Surgically, stable at present.    Orders set to chart for MiraLAX, Mineral Oil and Senna- all daily.    Will follow until BM production.    Reviewed with patient in detail, Hospitalist, Surgical PA and tonight's RN team.    Please note that more than 50 minutes was spent in face to face time with greater than 50% in counseling and coordination of care.    Full formal consultation to follow.

## 2025-02-20 NOTE — CARE COORDINATION ASSESSMENT. - NSDCPLANSERVICES_GEN_ALL_CORE
[facility] Care - CDPAP services phone ([phone])   [facility] (Nursing Home Transition and Diversion Program CM Peg HOWARD ([phone])  Fax ([phone]), ([phone])   PTE recommending home PT Services, pt's daughter declined referral to [facility] stating that the [facility] program provides comprehensive services such as RN, home PT and OT.  Form [id number] needs to be completed entirely prior to pt's discharge./Same as Prior Admission

## 2025-02-20 NOTE — CARE COORDINATION ASSESSMENT. - PATIENT WAS INVOLVED WITH A HOMECARE AGENCY PRIOR TO ADMISSION?
Pt receives home care services (12 hrs x 7 days a week from 7am - 7pm) from [facility]/Nursing Home Transition and Diversion waiver program.   Pt also receives an additional (6hrs x 7days) CDPAP home care services (from 7pm-1am) from [facility]. Pt's daughter Zoe is the CDPAP caregiver.   Per daughter the [facility] program provides patient with RN, PT and OT services. Pt's daughter stated that form MELISSA 4359 needs to be fully completed and submitted to J[facility]NIOR WYATT before discharge. Peg CALLEJAS can be reached at phone ([phone]), fax ([phone]), ([phone])./Yes

## 2025-02-20 NOTE — CONSULT NOTE ADULT - SUBJECTIVE AND OBJECTIVE BOX
[facility]  INFECTIOUS DISEASES   [address] [place] [address]  Tel: [phone]     Fax: [phone]  ==============================================================================    RAYRAY RODRIGUEZ  MRN-[id number]  Female  74y ([birth year])        Patient is a 74y old  Female who presents with a chief complaint of Fever (20 Feb 2025 04:55)      HPI:  74yFemale pmhx of Epilepsy on Keppra, COPD,, DM2  asthma on chronic steroids, bronchiectasis, tracheomalacia s/p tracheloplasty HTN, Hx of dissection of descending thoracic aorta, , hx of aortic aneurysm,  Type B 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  Evaluated by Dr. Antonio,  Type A dissection s/p bioAVR with Bental procedure (9/2022), severe AS s/p TAVR (9/10/2023), TIA's, DVT Afib on Eliquis, T2DM, Colon cancer, neuropathy, presents to the ED for fever. Pt reports having past 2-3 days of cough associated with fever. Patient endorses increased weakness and fatigue. Patient has hx of recurrent PNA recently admitted for COPD exacerabation. Currently on Doxycycline prescribed by PCP    ED course  Vitals: T: 99.9F , HR: 60 , BP: 111/89 , RR: 20 , SpO2:  100% on RA  Significant labs: WBC 10.58, Hgb 11.8, Hct 37.8,  Na 139, K 3.8, BUN 15, Cr .77  Lactate 1.3, ProCalcitonin .08  Troponin 28.2  ProBNP 1896  Imaging:   CT ANGIO PE, ABD, PELV IV   No parenchymal infiltration or consolidation.  No intra-abdominal abscess or evidence of enterocolitis.  Known type A aortic dissection.  Stable chronic findings as described  In ED patient given: OFIRMEV, ertapenem, benadryl, 1L NS Bolus     (20 Feb 2025 04:55)      ID consulted for workup and antibiotic management     PAST MEDICAL & SURGICAL HISTORY:  DVT (deep venous thrombosis)15-20 years ago, took coumadin  COPD (chronic obstructive pulmonary disease)  Atrial fibrillation  Aortic valve replaced  Epilepsy  Asthma  DM (diabetes mellitus)  History of TIAs  HTN (hypertension)  Bronchiectasis  History of partial hysterectomy  H/O total knee replacement, bilateral  History of sinus surgery  multiple sinus surgeries  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  H/O pelvic surgery  5 years ago - s/p fracture  History of tracheomalacia  2015 - attempted tracheal stenting ([facility])- course complicated by obstruction, respiratory failure, multiple CPR attempts -  stent discontinued; 10/20/2016 Tracheobronchoplasty (Prolene Mesh) performed at [facility] by Dr Zapien  S/P bronchoscopy  6/5/2018 - [facility] (Dr Zapien) no evidence of tracheobronchomalacia in trachea or bronchial tubes  Rectal bleeding  exam under anesthesia ([facility]) 2/2018  S/P TAVR (transcatheter aortic valve replacement)  S/P colostomy        Allergies  aspirin (Short breath)  cefepime (Anaphylaxis)  meropenem (Unknown)  Percocet (Unknown)  Avelox (Unknown)  shellfish (Anaphylaxis)  OxyContin (Unknown)  codeine (Unknown)  Valium (Unknown)  iodine (Short breath; Swelling)  tetanus toxoid (Short breath)  penicillin (Anaphylaxis)  Dilaudid (Short breath)  codeine (Short breath)  Avelox (Short breath; Pruritus)  ampicillin (Unknown)      ANTIMICROBIALS:  ertapenem  IVPB 1000 every 24 hours    MEDICATIONS  (STANDING):  ertapenem  IVPB   100 mL/Hr IV Intermittent (02-20-25 @ 02:12)      OTHER MEDS: MEDICATIONS  (STANDING):  albuterol    90 MICROgram(s) HFA Inhaler 2 every 6 hours PRN  albuterol/ipratropium for Nebulization 3 every 6 hours  apixaban 5 every 12 hours  buDESOnide    Inhalation Suspension 0.5 daily  clopidogrel Tablet 75 daily  dextrose 50% Injectable 25 once  dextrose 50% Injectable 12.5 once  dextrose 50% Injectable 25 once  dextrose Oral Gel 15 once PRN  gabapentin 200 three times a day  glucagon  Injectable 1 once  glycopyrrolate 1 three times a day  insulin glargine Injectable (LANTUS) 25 at bedtime  insulin lispro (ADMELOG) corrective regimen sliding scale  three times a day before meals  labetalol 100 every 8 hours  levETIRAcetam 1000 two times a day  megestrol 20 daily  methylPREDNISolone 8 two times a day  mexiletine 200 three times a day  montelukast 10 daily  NIFEdipine XL 30 daily  pantoprazole    Tablet 40 before breakfast  rosuvastatin 5 at bedtime  sertraline 50 daily      SOCIAL HISTORY:     Smoking Cigarettes [ ]Active [ ] Former [ ]Denies   ETOH [ ]denies [ ]Former [ ]Current Use denies   Drug Use [ ]Never [ ] Former [ ] Active     FAMILY HISTORY:  Family history of asthma  Family history of breast cancer (Sibling)  Family history of diabetes mellitus type II        REVIEW OF SYSTEMS  [  ] ROS unobtainable because:    [  ] All other systems negative except as noted below:	    Constitutional:  [ ] fever [ ] chills  [ ] weight loss  [ ] weakness  Skin:  [ ] rash [ ] phlebitis	  Eyes: [ ] icterus [ ] pain  [ ] discharge	  ENMT: [ ] sore throat  [ ] thrush [ ] ulcers [ ] exudates  Respiratory: [ ] dyspnea [ ] hemoptysis [ ] cough [ ] sputum	  Cardiovascular:  [ ] chest pain [ ] palpitations [ ] edema	  Gastrointestinal:  [ ] nausea [ ] vomiting [ ] diarrhea [ ] constipation [ ] pain	  Genitourinary:  [ ] dysuria [ ] frequency [ ] hematuria [ ] discharge [ ] flank pain  [ ] incontinence  Musculoskeletal:  [ ] myalgias [ ] arthralgias [ ] arthritis  [ ] back pain  Neurological:  [ ] headache [ ] seizures  [ ] confusion/altered mental status  Psychiatric:  [ ] anxiety [ ] depression	  Hematology/Lymphatics:  [ ] lymphadenopathy  Endocrine:  [ ] adrenal [ ] thyroid  Allergic/Immunologic:	 [ ] transplant [ ] seasonal    Vital Signs Last 24 Hrs  T(F): 98 (02-20-25 @ 10:09), Max: 99.9 (02-20-25 @ 03:27)    Vital Signs Last 24 Hrs  HR: 60 (02-20-25 @ 10:09) (57 - 81)  BP: 124/74 (02-20-25 @ 10:09) (111/89 - 145/74)  RR: 20 (02-20-25 @ 10:09)  SpO2: 96% (02-20-25 @ 10:09) (92% - 100%)  Wt(kg): --    PHYSICAL EXAM:  Constitutional: non-toxic, no distress  HEAD/EYES: anicteric, no conjunctival injection  ENT:  supple, no thrush  Cardiovascular:   normal S1, S2, no murmur, no edema  Respiratory:  clear BS bilaterally, no wheezes, no rales  GI:  soft, non-tender, normal bowel sounds  :  no ramirez, no CVA tenderness  Musculoskeletal:  no synovitis, normal ROM  Neurologic: awake and alert, normal strength, no focal findings  Skin:  no rash, no erythema, no phlebitis  Heme/Onc: no lymphadenopathy   Psychiatric:  awake, alert, appropriate mood        WBC  WBC Count: 10.58 K/uL (02-20 @ 01:50)        CBC                        11.8   10.58 )-----------( 200      ( 20 Feb 2025 01:50 )             37.8     BMP/CMP  02-20    139  |  106  |  15  ----------------------------<  116[H]  3.8   |  28  |  0.77    Ca    8.8      20 Feb 2025 01:50    TPro  6.5  /  Alb  3.0[L]  /  TBili  0.5  /  DBili  x   /  AST  16  /  ALT  17  /  AlkPhos  63  02-20    Creatinine Trend  Creatinine Trend: 0.77<--    Lactate, Blood: 1.3 mmol/L (02-20-25 @ 01:50)      MICROBIOLOGY:    SARS-CoV-2: NotDetec (08 Jan 2025 11:50)  SARS-CoV-2: NotDetec (12 Dec 2024 14:10)  SARS-CoV-2: NotDetec (05 Nov 2024 00:15)        RADIOLOGY:  ACC: [id number] EXAM:  XR CHEST PORTABLE URGENT 1V   ORDERED BY: SUKHJINDER AGUILERA     PROCEDURE DATE:  02/20/2025      INTERPRETATION:  EXAM: XR CHEST URGENT    INDICATION: Sepsis PXR    COMPARISON: February 2, 2025 chest x-ray and CT performed today    IMPRESSION: No focal infiltrate or congestion. Borderline cardiomegaly on   portable exam. Sternotomy noted. Aortic valve stent graft noted. Osseous   structures unchanged    --- End of Report ---    ACC: [id number] EXAM:  CT ABDOMEN AND PELVIS IC   ORDERED BY: SUKHJINDER AGUILERA     ACC: [id number] EXAM:  CT ANGIO CHEST PULM ART [facility]   ORDERED BY: SUKHJINDER AGUILERA     PROCEDURE DATE:  02/20/2025      INTERPRETATION:  CLINICAL INFORMATION: Fever and hypoxia. History of   bronchiectasis. Decreased appetite. History of colon cancer.    COMPARISON: 1/8/2025, 12/12/2024    CONTRAST/COMPLICATIONS:  IV Contrast: Omnipaque 350  90 cc administered   10 cc discarded  Oral Contrast: NONE    PROCEDURE:  CT Angiography of the Chest was performed followed by portal venous phase   imaging of the Abdomen and Pelvis.  Sagittal and coronal reformats were performed as well as 3D (MIP)   reconstructions.    FINDINGS:  CHEST:  LUNGS AND LARGE AIRWAYS: Patent central airways. No pulmonary nodules. No   consolidation or parenchymal infiltration. Motion artifact limits   detailed evaluation of the lung parenchyma.  PLEURA: No pleural effusion.  VESSELS: Known type A aortic dissection starting at the innominate artery   extending to the infrarenal aorta. Extensive atherosclerotic   calcification. Aortic valve replacement.  HEART: Heart size is normal. No pericardial effusion.  MEDIASTINUM AND MARANDA: No lymphadenopathy.  CHEST WALL AND LOWER NECK: Within normal limits.    ABDOMEN AND PELVIS:  LIVER: Within normal limits.  BILE DUCTS: Normal caliber.  GALLBLADDER: Within normal limits.  SPLEEN: Within normal limits.  PANCREAS: No change of hypodense lesions in or around the pancreatic tail.  ADRENALS: Within normal limits.  KIDNEYS/URETERS: Bilateral hypodense lesions with variable attenuation   more prominent on the left. No perinephric stranding or hydronephrosis.    BLADDER: Overdistended. No wall thickening.  REPRODUCTIVE ORGANS: Hysterectomy.    BOWEL: No bowel obstruction. Appendix is not visualized. Status post   rectosigmoid resection with left anterior colostomy. Large stool burden.  PERITONEUM/RETROPERITONEUM: Within normal limits.  VESSELS: Atherosclerotic changes. Dissection flap extending to the   infrarenal aorta.  LYMPH NODES: No lymphadenopathy.  ABDOMINAL WALL: Left lower colostomy.  BONES: Surgical hardware in the right sacroiliac joint and symphysis   pubis. Avascular necrosis of both femoral heads.    IMPRESSION:  No parenchymal infiltration or consolidation.  No intra-abdominal abscess or evidence of enterocolitis.  Known type A aortic dissection.  Stable chronic findings as described.      --- End of Report --        ACC: [id number] EXAM:  CT ABDOMEN AND PELVIS IC   ORDERED BY: SUKHJINDER AGUILERA     ACC: [id number] EXAM:  CT ANGIO CHEST PULM ART [facility]   ORDERED BY: SUKHJINDER AGUILERA     PROCEDURE DATE:  02/20/2025        INTERPRETATION:  CLINICAL INFORMATION: Fever and hypoxia. History of   bronchiectasis. Decreased appetite. History of colon cancer.    COMPARISON: 1/8/2025, 12/12/2024    CONTRAST/COMPLICATIONS:  IV Contrast: Omnipaque 350  90 cc administered   10 cc discarded  Oral Contrast: NONE      PROCEDURE:  CT Angiography of the Chest was performed followed by portal venous phase   imaging of the Abdomen and Pelvis.  Sagittal and coronal reformats were performed as well as 3D (MIP)   reconstructions.    FINDINGS:  CHEST:  LUNGS AND LARGE AIRWAYS: Patent central airways. No pulmonary nodules. No   consolidation or parenchymal infiltration. Motion artifact limits   detailed evaluation of the lung parenchyma.  PLEURA: No pleural effusion.  VESSELS: Known type A aortic dissection starting at the innominate artery   extending to the infrarenal aorta. Extensive atherosclerotic   calcification. Aortic valve replacement.  HEART: Heart size is normal. No pericardial effusion.  MEDIASTINUM AND MARANDA: No lymphadenopathy.  CHEST WALL AND LOWER NECK: Within normal limits.    ABDOMEN AND PELVIS:  LIVER: Within normal limits.  BILE DUCTS: Normal caliber.  GALLBLADDER: Within normal limits.  SPLEEN: Within normal limits.  PANCREAS: No change of hypodense lesions in or around the pancreatic tail.  ADRENALS: Within normal limits.  KIDNEYS/URETERS: Bilateral hypodense lesions with variable attenuation   more prominent on the left. No perinephric stranding or hydronephrosis.    BLADDER: Overdistended. No wall thickening.  REPRODUCTIVE ORGANS: Hysterectomy.    BOWEL: No bowel obstruction. Appendix is not visualized. Status post   rectosigmoid resection with left anterior colostomy. Large stool burden.  PERITONEUM/RETROPERITONEUM: Within normal limits.  VESSELS: Atherosclerotic changes. Dissection flap extending to the   infrarenal aorta.  LYMPH NODES: No lymphadenopathy.  ABDOMINAL WALL: Left lower colostomy.  BONES: Surgical hardware in the right sacroiliac joint and symphysis   pubis. Avascular necrosis of both femoral heads.    IMPRESSION:  No parenchymal infiltration or consolidation.  No intra-abdominal abscess or evidence of enterocolitis.  Known type A aortic dissection.  Stable chronic findings as described.      --- End of Report ---    I have personally reviewed the above imaging  [facility]  INFECTIOUS DISEASES   [address] [place] [address]  Tel: [phone]     Fax: [phone]  ==============================================================================    RAYRAY RODRIGUEZ  MRN-[id number]  Female  74y ([birth year])        Patient is a 74y old  Female who presents with a chief complaint of Fever (20 Feb 2025 04:55)      HPI:  The patient is 75 y/o woman with PMH of epilepsy, COPD, DM, bronchiectasis, tracheomalacia s/p tracheloplasty, HTN, hx of dissection of descending thoracic aorta, hx of aortic aneurysm, type B dissection (7/2024), type A dissection s/p bio AVR with Bent[place] procedure (9/2022), severe AS s/p TAVR (9/10/2023), TIA's, Afib on Eliquis, colon cancer s/p colostomy, who presents to the ED for fever. Patient reports having cough for past 2-3 days, associated with fever. Patient endorses increased weakness and fatigue. Patient has hx of recurrent PNA recently admitted to [facility] for COPD exacerbation. Currently on Doxycycline prescribed by PCP. Afebrile in ED. Work up shows: WBC 10.58, Cr 0.77, Lactate 1.3, Procalcitonin 0.08. CTA shows no parenchymal infiltration or consolidation. No intra-abdominal abscess or evidence of enterocolitis. Known type A aortic dissection. Started on ertapenem empirically. 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 by Dr Zapien  S/P bronchoscopy  6/5/2018 - [facility] (Dr Zapien) no evidence of tracheobronchomalacia in trachea or bronchial tubes  Rectal bleeding  exam under anesthesia ([facility]) 2/2018  S/P TAVR (transcatheter aortic valve replacement)  S/P colostomy        Allergies  aspirin (Short breath)  cefepime (Anaphylaxis)  meropenem (Unknown)  Percocet (Unknown)  Avelox (Unknown)  shellfish (Anaphylaxis)  OxyContin (Unknown)  codeine (Unknown)  Valium (Unknown)  iodine (Short breath; Swelling)  tetanus toxoid (Short breath)  penicillin (Anaphylaxis)  Dilaudid (Short breath)  codeine (Short breath)  Avelox (Short breath; Pruritus)  ampicillin (Unknown)      ANTIMICROBIALS:  ertapenem  IVPB 1000 every 24 hours    MEDICATIONS  (STANDING):  ertapenem  IVPB   100 mL/Hr IV Intermittent (02-20-25 @ 02:12)      OTHER MEDS: MEDICATIONS  (STANDING):  albuterol    90 MICROgram(s) HFA Inhaler 2 every 6 hours PRN  albuterol/ipratropium for Nebulization 3 every 6 hours  apixaban 5 every 12 hours  buDESOnide    Inhalation Suspension 0.5 daily  clopidogrel Tablet 75 daily  dextrose 50% Injectable 25 once  dextrose 50% Injectable 12.5 once  dextrose 50% Injectable 25 once  dextrose Oral Gel 15 once PRN  gabapentin 200 three times a day  glucagon  Injectable 1 once  glycopyrrolate 1 three times a day  insulin glargine Injectable (LANTUS) 25 at bedtime  insulin lispro (ADMELOG) corrective regimen sliding scale  three times a day before meals  labetalol 100 every 8 hours  levETIRAcetam 1000 two times a day  megestrol 20 daily  methylPREDNISolone 8 two times a day  mexiletine 200 three times a day  montelukast 10 daily  NIFEdipine XL 30 daily  pantoprazole    Tablet 40 before breakfast  rosuvastatin 5 at bedtime  sertraline 50 daily      SOCIAL HISTORY:     Smoking Cigarettes [ ]Active [ ] Former [ ]Denies   ETOH [ ]denies [ ]Former [ ]Current Use denies   Drug Use [ ]Never [ ] Former [ ] Active     FAMILY HISTORY:  Family history of asthma  Family history of breast cancer (Sibling)  Family history of diabetes mellitus type II        REVIEW OF SYSTEMS  [ x ] ROS unobtainable because:  Lethargic  [  ] All other systems negative except as noted below:	    Constitutional:  [ ] fever [ ] chills  [ ] weight loss  [ ] weakness  Skin:  [ ] rash [ ] phlebitis	  Eyes: [ ] icterus [ ] pain  [ ] discharge	  ENMT: [ ] sore throat  [ ] thrush [ ] ulcers [ ] exudates  Respiratory: [ ] dyspnea [ ] hemoptysis [ ] cough [ ] sputum	  Cardiovascular:  [ ] chest pain [ ] palpitations [ ] edema	  Gastrointestinal:  [ ] nausea [ ] vomiting [ ] diarrhea [ ] constipation [ ] pain	  Genitourinary:  [ ] dysuria [ ] frequency [ ] hematuria [ ] discharge [ ] flank pain  [ ] incontinence  Musculoskeletal:  [ ] myalgias [ ] arthralgias [ ] arthritis  [ ] back pain  Neurological:  [ ] headache [ ] seizures  [ ] confusion/altered mental status  Psychiatric:  [ ] anxiety [ ] depression	  Hematology/Lymphatics:  [ ] lymphadenopathy  Endocrine:  [ ] adrenal [ ] thyroid  Allergic/Immunologic:	 [ ] transplant [ ] seasonal    Vital Signs Last 24 Hrs  T(F): 98 (02-20-25 @ 10:09), Max: 99.9 (02-20-25 @ 03:27)    Vital Signs Last 24 Hrs  HR: 60 (02-20-25 @ 10:09) (57 - 81)  BP: 124/74 (02-20-25 @ 10:09) (111/89 - 145/74)  RR: 20 (02-20-25 @ 10:09)  SpO2: 96% (02-20-25 @ 10:09) (92% - 100%)  Wt(kg): --    PHYSICAL EXAM:  Constitutional: Elderly woman, on room air  HEENT: Bilateral proptosis  Cardiovascular:   normal S1, S2, no edema  Respiratory:  Course rhonchi bilateral lung fields  GI:  soft, non-tender, normal bowel sounds, + colostomy bag  :  no ramirez, no CVA tenderness  Musculoskeletal:  no visible deformity  Neurologic: Lethargic, goes to sleep while talking  Skin:  warm dry skin  Heme/Onc: no lymphadenopathy       WBC  WBC Count: 10.58 K/uL (02-20 @ 01:50)    CBC                        11.8   10.58 )-----------( 200      ( 20 Feb 2025 01:50 )             37.8     BMP/CMP  02-20    139  |  106  |  15  ----------------------------<  116[H]  3.8   |  28  |  0.77    Ca    8.8      20 Feb 2025 01:50    TPro  6.5  /  Alb  3.0[L]  /  TBili  0.5  /  DBili  x   /  AST  16  /  ALT  17  /  AlkPhos  63  02-20    Creatinine Trend  Creatinine Trend: 0.77<--    Lactate, Blood: 1.3 mmol/L (02-20-25 @ 01:50)      MICROBIOLOGY:    SARS-CoV-2: NotDetec (08 Jan 2025 11:50)  SARS-CoV-2: NotDetec (12 Dec 2024 14:10)  SARS-CoV-2: NotDetec (05 Nov 2024 00:15)        RADIOLOGY:  ACC: [id number] EXAM:  XR CHEST PORTABLE URGENT 1V   ORDERED BY: SUKHJINDER AGUILERA     PROCEDURE DATE:  02/20/2025      INTERPRETATION:  EXAM: XR CHEST URGENT    INDICATION: Sepsis PXR    COMPARISON: February 2, 2025 chest x-ray and CT performed today    IMPRESSION: No focal infiltrate or congestion. Borderline cardiomegaly on   portable exam. Sternotomy noted. Aortic valve stent graft noted. Osseous   structures unchanged    --- End of Report ---    ACC: [id number] EXAM:  CT ABDOMEN AND PELVIS IC   ORDERED BY: SUKHJINDER AGUILERA     ACC: [id number] EXAM:  CT ANGIO CHEST PULM [facility]   ORDERED BY: SUKHJINDER AGUILERA     PROCEDURE DATE:  02/20/2025      INTERPRETATION:  CLINICAL INFORMATION: Fever and hypoxia. History of   bronchiectasis. Decreased appetite. History of colon cancer.    COMPARISON: 1/8/2025, 12/12/2024    CONTRAST/COMPLICATIONS:  IV Contrast: Omnipaque 350  90 cc administered   10 cc discarded  Oral Contrast: NONE    PROCEDURE:  CT Angiography of the Chest was performed followed by portal venous phase   imaging of the Abdomen and Pelvis.  Sagittal and coronal reformats were performed as well as 3D (MIP)   reconstructions.    FINDINGS:  CHEST:  LUNGS AND LARGE AIRWAYS: Patent central airways. No pulmonary nodules. No   consolidation or parenchymal infiltration. Motion artifact limits   detailed evaluation of the lung parenchyma.  PLEURA: No pleural effusion.  VESSELS: Known type A aortic dissection starting at the innominate artery   extending to the infrarenal aorta. Extensive atherosclerotic   calcification. Aortic valve replacement.  HEART: Heart size is normal. No pericardial effusion.  MEDIASTINUM AND MARANDA: No lymphadenopathy.  CHEST WALL AND LOWER NECK: Within normal limits.    ABDOMEN AND PELVIS:  LIVER: Within normal limits.  BILE DUCTS: Normal caliber.  GALLBLADDER: Within normal limits.  SPLEEN: Within normal limits.  PANCREAS: No change of hypodense lesions in or around the pancreatic tail.  ADRENALS: Within normal limits.  KIDNEYS/URETERS: Bilateral hypodense lesions with variable attenuation   more prominent on the left. No perinephric stranding or hydronephrosis.    BLADDER: Overdistended. No wall thickening.  REPRODUCTIVE ORGANS: Hysterectomy.    BOWEL: No bowel obstruction. Appendix is not visualized. Status post   rectosigmoid resection with left anterior colostomy. Large stool burden.  PERITONEUM/RETROPERITONEUM: Within normal limits.  VESSELS: Atherosclerotic changes. Dissection flap extending to the   infrarenal aorta.  LYMPH NODES: No lymphadenopathy.  ABDOMINAL WALL: Left lower colostomy.  BONES: Surgical hardware in the right sacroiliac joint and symphysis   pubis. Avascular necrosis of both femoral heads.    IMPRESSION:  No parenchymal infiltration or consolidation.  No intra-abdominal abscess or evidence of enterocolitis.  Known type A aortic dissection.  Stable chronic findings as described.      --- End of Report --        ACC: [id number] EXAM:  CT ABDOMEN AND PELVIS IC   ORDERED BY: SUKHJINDER AGUILERA     ACC: [id number] EXAM:  CT ANGIO CHEST PULECU [facility]   ORDERED BY: SUKHJINDER AGUILERA     PROCEDURE DATE:  02/20/2025        INTERPRETATION:  CLINICAL INFORMATION: Fever and hypoxia. History of   bronchiectasis. Decreased appetite. History of colon cancer.    COMPARISON: 1/8/2025, 12/12/2024    CONTRAST/COMPLICATIONS:  IV Contrast: Omnipaque 350  90 cc administered   10 cc discarded  Oral Contrast: NONE      PROCEDURE:  CT Angiography of the Chest was performed followed by portal venous phase   imaging of the Abdomen and Pelvis.  Sagittal and coronal reformats were performed as well as 3D (MIP)   reconstructions.    FINDINGS:  CHEST:  LUNGS AND LARGE AIRWAYS: Patent central airways. No pulmonary nodules. No   consolidation or parenchymal infiltration. Motion artifact limits   detailed evaluation of the lung parenchyma.  PLEURA: No pleural effusion.  VESSELS: Known type A aortic dissection starting at the innominate artery   extending to the infrarenal aorta. Extensive atherosclerotic   calcification. Aortic valve replacement.  HEART: Heart size is normal. No pericardial effusion.  MEDIASTINUM AND MARANDA: No lymphadenopathy.  CHEST WALL AND LOWER NECK: Within normal limits.    ABDOMEN AND PELVIS:  LIVER: Within normal limits.  BILE DUCTS: Normal caliber.  GALLBLADDER: Within normal limits.  SPLEEN: Within normal limits.  PANCREAS: No change of hypodense lesions in or around the pancreatic tail.  ADRENALS: Within normal limits.  KIDNEYS/URETERS: Bilateral hypodense lesions with variable attenuation   more prominent on the left. No perinephric stranding or hydronephrosis.    BLADDER: Overdistended. No wall thickening.  REPRODUCTIVE ORGANS: Hysterectomy.    BOWEL: No bowel obstruction. Appendix is not visualized. Status post   rectosigmoid resection with left anterior colostomy. Large stool burden.  PERITONEUM/RETROPERITONEUM: Within normal limits.  VESSELS: Atherosclerotic changes. Dissection flap extending to the   infrarenal aorta.  LYMPH NODES: No lymphadenopathy.  ABDOMINAL WALL: Left lower colostomy.  BONES: Surgical hardware in the right sacroiliac joint and symphysis   pubis. Avascular necrosis of both femoral heads.    IMPRESSION:  No parenchymal infiltration or consolidation.  No intra-abdominal abscess or evidence of enterocolitis.  Known type A aortic dissection.  Stable chronic findings as described.      --- End of Report ---    I have personally reviewed the above imaging

## 2025-02-20 NOTE — H&P ADULT - CARDIOVASCULAR
details… normal/regular rate and rhythm/S1 S2 present/no gallops/no rub/no murmur normal/regular rate and rhythm/S1 S2 present/no gallops/no rub/no murmur/no JVD/no pedal edema/vascular

## 2025-02-20 NOTE — H&P ADULT - PROBLEM SELECTOR PLAN 1
Pt with reported fever and hypoxia at home associated with general weakness.  - Pt afebrile in ED, satting well on room air  - CT ANGIO PE, ABD, PELV IV   No parenchymal infiltration or consolidation.  No intra-abdominal abscess or evidence of enterocolitis.  Known type A aortic dissection.  Stable chronic findings as described.  - WBC 10.58, Lactate 1.3, Procalcitonin .08   - ProBNP 1896  - Ertapenem started in ED will continue, tolerated in past.  - Pt has hx of PNA in past requiring abx  - F/u blood cx, urine cx, urine strep ag, legionella  - ID Dr Peng consulted recs appreciated

## 2025-02-20 NOTE — CONSULT NOTE ADULT - SUBJECTIVE AND OBJECTIVE BOX
Date/Time Patient Seen:  		  Referring MD:   Data Reviewed	       Patient is a 74y old  Female who presents with a chief complaint of Fever (20 Feb 2025 10:31)      Subjective/HPI   .74yFemale pmhx of Epilepsy on Keppra, COPD,, DM2  asthma on chronic steroids, bronchiectasis, tracheomalacia s/p tracheloplasty HTN, Hx of dissection of descending thoracic aorta, , hx of aortic aneurysm,  Type B 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  Evaluated by Dr. Antonio,  Type A dissection s/p bioAVR with Bental procedure (9/2022), severe AS s/p TAVR (9/10/2023), TIA's, DVT Afib on Eliquis, T2DM, Colon cancer, neuropathy, presents to the ED for shortness of breath. Patient reports for the past 1-2 days having increased chest pressure out of her baseline, states she has also had cough, wheezing and shortness of breath with minimal relief after using her normal regimen of inhalers and nebulizers. Patient denies any fevers since onset of symptoms, however states her legs have been on/off swelling for many years. Patient of note has had multiple episodes and hospital visits for PNA over the past 2-3 months recent discharge on 12/12/24 at [facility] treated with ertapanem premedicated with benadryl. Pt otherwise denies any other concerns or complaints at this time, no fever, headache, cp, n/v/d.    Patient History:    Past Medical, Past Surgical, and Family History:  PAST MEDICAL HISTORY:  Afib     Aortic valve replaced     Asthma     Bronchiectasis     DM (diabetes mellitus)     Epilepsy     History of COPD     History of seizure disorder     History of TIAs     HTN (hypertension).    PAST SURGICAL HISTORY:  S/P AVR (aortic valve replacement)     S/P colostomy.     Social History:  · Substance use	No  · Social History (marital status, living situation, occupation, and sexual history)	Lives at home with daughter, requires assistance to ambulate with walker  No alcohol use, never smoker  Not working     Tobacco Screening:  · Core Measure Site	Yes  · Has the patient used tobacco in the past 30 days?	No    Risk Assessment:    Present on Admission:  Deep Venous Thrombosis	no  Pulmonary Embolus	no  Urinary Catheter	no  Central Venous Catheter/PICC Line	no  Surgical Site Incision	no  Pressure Ulcer(s)	no     HIV Screening:  · In accordance with [place] State law, we offer every patient who comes to our ED an HIV test. Would you like to be tested today?	Opt out     Hepatitis C Test Questions:  · In accordance with [place] State Law, we offer every patient a Hepatitis C test. Would you like to be tested today?	Opt out    PAST MEDICAL & SURGICAL HISTORY:  Atrial fibrillation  paroxysmal, on eliquis    Diabetes  Type 2    Hypertension    COPD (chronic obstructive pulmonary disease)    Adrenal insufficiency  Medrol daily for over 50 years    Aortic insufficiency  moderate AR on echo 5/3/2018    Pelvic fracture    Asthma    Hypertension    Tracheobronchomalacia  diagnosed 2015, s/p bronchial thermoplasty 2016 (Dr Zapien); recent bronchoscopy 6/5/2018 revealed no evidence of tracheobronchomalacia in trachea or bronchial tubes    Colorectal cancer  4/2018- last treatment , chemo and radiation    Aortic disease  leaky valve    Rectal bleeding    Seizure  x 1 1/7/18    DVT (deep venous thrombosis)  15-20 years ago, took coumadin    TIA (transient ischemic attack)  multiple, last 5 years ago - presents as right-sided weakness    COPD (chronic obstructive pulmonary disease)    Atrial fibrillation    History of COPD    Afib    Aortic valve replaced    Epilepsy    Asthma    DM (diabetes mellitus)    History of seizure disorder    History of TIAs    HTN (hypertension)    Bronchiectasis    History of partial hysterectomy  30 years ago - fibroids    H/O total knee replacement, bilateral  5 years ago    History of sinus surgery  multiple sinus surgeries    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    H/O pelvic surgery  5 years ago - s/p fracture    History of surgery  tracheo thermoplasty 2016    History of tracheomalacia  2015 - attempted tracheal stenting ([facility])- course complicated by obstruction, respiratory failure, multiple CPR attempts -  stent discontinued; 10/20/2016 Tracheobronchoplasty (Prolene Mesh) performed at [facility] by Dr Zapien    S/P bronchoscopy  6/5/2018 - [place] (Dr Zapien) no evidence of tracheobronchomalacia in trachea or bronchial tubes    Rectal bleeding  exam under anesthesia ([facility]) 2/2018    S/P TAVR (transcatheter aortic valve replacement)    S/P aortic valve replacement    No significant past surgical history    S/P colostomy    S/P AVR (aortic valve replacement)          Medication list         MEDICATIONS  (STANDING):  albuterol/ipratropium for Nebulization 3 milliLiter(s) Nebulizer every 6 hours  apixaban 5 milliGRAM(s) Oral every 12 hours  buDESOnide    Inhalation Suspension 0.5 milliGRAM(s) Inhalation daily  clopidogrel Tablet 75 milliGRAM(s) Oral daily  dextrose 5%. 1000 milliLiter(s) (100 mL/Hr) IV Continuous <Continuous>  dextrose 5%. 1000 milliLiter(s) (50 mL/Hr) IV Continuous <Continuous>  dextrose 50% Injectable 25 Gram(s) IV Push once  dextrose 50% Injectable 12.5 Gram(s) IV Push once  dextrose 50% Injectable 25 Gram(s) IV Push once  ertapenem  IVPB 1000 milliGRAM(s) IV Intermittent every 24 hours  ferrous    sulfate 325 milliGRAM(s) Oral daily  gabapentin 200 milliGRAM(s) Oral three times a day  glucagon  Injectable 1 milliGRAM(s) IntraMuscular once  glycopyrrolate 1 milliGRAM(s) Oral three times a day  insulin glargine Injectable (LANTUS) 25 Unit(s) SubCutaneous at bedtime  insulin lispro (ADMELOG) corrective regimen sliding scale   SubCutaneous three times a day before meals  labetalol 100 milliGRAM(s) Oral every 8 hours  levETIRAcetam 1000 milliGRAM(s) Oral two times a day  megestrol 20 milliGRAM(s) Oral daily  methylPREDNISolone 8 milliGRAM(s) Oral two times a day  mexiletine 200 milliGRAM(s) Oral three times a day  montelukast 10 milliGRAM(s) Oral daily  NIFEdipine XL 30 milliGRAM(s) Oral daily  pantoprazole    Tablet 40 milliGRAM(s) Oral before breakfast  rosuvastatin 5 milliGRAM(s) Oral at bedtime  sertraline 50 milliGRAM(s) Oral daily    MEDICATIONS  (PRN):  albuterol    90 MICROgram(s) HFA Inhaler 2 Puff(s) Inhalation every 6 hours PRN for shortness of breath and/or wheezing  dextrose Oral Gel 15 Gram(s) Oral once PRN Blood Glucose LESS THAN 70 milliGRAM(s)/deciliter         Vitals log        ICU Vital Signs Last 24 Hrs  T(C): 36.7 (20 Feb 2025 10:09), Max: 37.7 (20 Feb 2025 03:27)  T(F): 98 (20 Feb 2025 10:09), Max: 99.9 (20 Feb 2025 03:27)  HR: 60 (20 Feb 2025 10:09) (57 - 81)  BP: 124/74 (20 Feb 2025 10:09) (111/89 - 145/74)  BP(mean): 71 (20 Feb 2025 06:29) (70 - 98)  ABP: --  ABP(mean): --  RR: 20 (20 Feb 2025 10:09) (20 - 23)  SpO2: 96% (20 Feb 2025 10:09) (92% - 100%)    O2 Parameters below as of 20 Feb 2025 10:09  Patient On (Oxygen Delivery Method): nasal cannula  O2 Flow (L/min): 2               Input and Output:  I&O's Detail    20 Feb 2025 07:01  -  20 Feb 2025 13:26  --------------------------------------------------------  IN:  Total IN: 0 mL    OUT:    Voided (mL): 950 mL  Total OUT: 950 mL    Total NET: -950 mL          Lab Data                        11.8   10.58 )-----------( 200      ( 20 Feb 2025 01:50 )             37.8     02-20    139  |  106  |  15  ----------------------------<  116[H]  3.8   |  28  |  0.77    Ca    8.8      20 Feb 2025 01:50    TPro  6.5  /  Alb  3.0[L]  /  TBili  0.5  /  DBili  x   /  AST  16  /  ALT  17  /  AlkPhos  63  02-20            Review of Systems	      Objective     Physical Examination        Pertinent Lab findings & Imaging      Seven:  NO   Adequate UO     I&O's Detail    20 Feb 2025 07:01  -  20 Feb 2025 13:26  --------------------------------------------------------  IN:  Total IN: 0 mL    OUT:    Voided (mL): 950 mL  Total OUT: 950 mL    Total NET: -950 mL               Discussed with:     Cultures:	        Radiology      ACC: [id number] EXAM:  XR CHEST PORTABLE URGENT 1V   ORDERED BY: SUKHJINDER AGUILERA     PROCEDURE DATE:  02/20/2025          INTERPRETATION:  EXAM: XR CHEST URGENT    INDICATION: Sepsis PXR    COMPARISON: February 2, 2025 chest x-ray and CT performed today    IMPRESSION: No focal infiltrate or congestion. Borderline cardiomegaly on   portable exam. Sternotomy noted. Aortic valve stent graft noted. Osseous   structures unchanged    --- End of Report ---            NOHEMY CUETO MD; Attending Radiologist  This document has been electronically signed. Feb 20 2025  9:19AM        CONTRAST/COMPLICATIONS:  IV Contrast: Omnipaque 350  90 cc administered   10 cc discarded  Oral Contrast: NONE  .    PROCEDURE:  CT Angiography of the Chest was performed followed by portal venous phase   imaging of the Abdomen and Pelvis.  Sagittal and coronal reformats were performed as well as 3D (MIP)   reconstructions.    FINDINGS:  CHEST:  LUNGS AND LARGE AIRWAYS: Patent central airways. No pulmonary nodules. No   consolidation or parenchymal infiltration. Motion artifact limits   detailed evaluation of the lung parenchyma.  PLEURA: No pleural effusion.  VESSELS: Known type A aortic dissection starting at the innominate artery   extending to the infrarenal aorta. Extensive atherosclerotic   calcification. Aortic valve replacement.  HEART: Heart size is normal. No pericardial effusion.  MEDIASTINUM AND MARANDA: No lymphadenopathy.  CHEST WALL AND LOWER NECK: Within normal limits.    ABDOMEN AND PELVIS:  LIVER: Within normal limits.  BILE DUCTS: Normal caliber.  GALLBLADDER: Within normal limits.  SPLEEN: Within normal limits.  PANCREAS: No change of hypodense lesions in or around the pancreatic tail.  ADRENALS: Within normal limits.  KIDNEYS/URETERS: Bilateral hypodense lesions with variable attenuation   more prominent on the left. No perinephric stranding or hydronephrosis.    BLADDER: Overdistended. No wall thickening.  REPRODUCTIVE ORGANS: Hysterectomy.    BOWEL: No bowel obstruction. Appendix is not visualized. Status post   rectosigmoid resection with left anterior colostomy. Large stool burden.  PERITONEUM/RETROPERITONEUM: Within normal limits.  VESSELS: Atherosclerotic changes. Dissection flap extending to the   infrarenal aorta.  LYMPH NODES: No lymphadenopathy.  ABDOMINAL WALL: Left lower colostomy.  BONES: Surgical hardware in the right sacroiliac joint and symphysis   pubis. Avascular necrosis of both femoral heads.    IMPRESSION:  No parenchymal infiltration or consolidation.  No intra-abdominal abscess or evidence of enterocolitis.  Known type A aortic dissection.  Stable chronic findings as described.      --- End of Report ---             BESSIE GILES MD; Attending Radiologist  This document has been electronically signed. Feb 20 2025  3:18AM                   Date/Time Patient Seen:  		  Referring MD:   Data Reviewed	       Patient is a 74y old  Female who presents with a chief complaint of Fever (20 Feb 2025 10:31)      Subjective/HPI   .74yFemale pmhx of Epilepsy on Keppra, COPD,, DM2  asthma on chronic steroids, bronchiectasis, tracheomalacia s/p tracheloplasty HTN, Hx of dissection of descending thoracic aorta, , hx of aortic aneurysm,  Type B 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  Evaluated by Dr. Antonio,  Type A dissection s/p bioAVR with Bental procedure (9/2022), severe AS s/p TAVR (9/10/2023), TIA's, DVT Afib on Eliquis, T2DM, Colon cancer, neuropathy, presents to the ED for shortness of breath. Patient reports for the past 1-2 days having increased chest pressure out of her baseline, states she has also had cough, wheezing and shortness of breath with minimal relief after using her normal regimen of inhalers and nebulizers. Patient denies any fevers since onset of symptoms, however states her legs have been on/off swelling for many years. Patient of note has had multiple episodes and hospital visits for PNA over the past 2-3 months recent discharge on 12/12/24 at [facility] treated with ertapanem premedicated with benadryl. Pt otherwise denies any other concerns or complaints at this time, no fever, headache, cp, n/v/d.    Patient History:    Past Medical, Past Surgical, and Family History:  PAST MEDICAL HISTORY:  Afib     Aortic valve replaced     Asthma     Bronchiectasis     DM (diabetes mellitus)     Epilepsy     History of COPD     History of seizure disorder     History of TIAs     HTN (hypertension).    PAST SURGICAL HISTORY:  S/P AVR (aortic valve replacement)     S/P colostomy.     Social History:  · Substance use	No  · Social History (marital status, living situation, occupation, and sexual history)	Lives at home with daughter, requires assistance to ambulate with walker  No alcohol use, never smoker  Not working     Tobacco Screening:  · Core Measure Site	Yes  · Has the patient used tobacco in the past 30 days?	No    Risk Assessment:    Present on Admission:  Deep Venous Thrombosis	no  Pulmonary Embolus	no  Urinary Catheter	no  Central Venous Catheter/PICC Line	no  Surgical Site Incision	no  Pressure Ulcer(s)	no     HIV Screening:  · In accordance [place] law, we offer every patient who comes to our ED an HIV test. Would you like to be tested today?	Opt out     Hepatitis C Test Questions:  · In accordance with [place] Law, we offer every patient a Hepatitis C test. Would you like to be tested today?	Opt out    PAST MEDICAL & SURGICAL HISTORY:  Atrial fibrillation  paroxysmal, on eliquis    Diabetes  Type 2    Hypertension    COPD (chronic obstructive pulmonary disease)    Adrenal insufficiency  Medrol daily for over 50 years    Aortic insufficiency  moderate AR on echo 5/3/2018    Pelvic fracture    Asthma    Hypertension    Tracheobronchomalacia  diagnosed 2015, s/p bronchial thermoplasty 2016 (Dr Zapien); recent bronchoscopy 6/5/2018 revealed no evidence of tracheobronchomalacia in trachea or bronchial tubes    Colorectal cancer  4/2018- last treatment , chemo and radiation    Aortic disease  leaky valve    Rectal bleeding    Seizure  x 1 1/7/18    DVT (deep venous thrombosis)  15-20 years ago, took coumadin    TIA (transient ischemic attack)  multiple, last 5 years ago - presents as right-sided weakness    COPD (chronic obstructive pulmonary disease)    Atrial fibrillation    History of COPD    Afib    Aortic valve replaced    Epilepsy    Asthma    DM (diabetes mellitus)    History of seizure disorder    History of TIAs    HTN (hypertension)    Bronchiectasis    History of partial hysterectomy  30 years ago - fibroids    H/O total knee replacement, bilateral  5 years ago    History of sinus surgery  multiple sinus surgeries    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    H/O pelvic surgery  5 years ago - s/p fracture    History of surgery  tracheo thermoplasty 2016    History of tracheomalacia  2015 - attempted tracheal stenting ([facility])- course complicated by obstruction, respiratory failure, multiple CPR attempts -  stent discontinued; 10/20/2016 Tracheobronchoplasty (Prolene Mesh) performed at [facility] by Dr Zapien    S/P bronchoscopy  6/5/2018 - [place] (Dr Zapien) no evidence of tracheobronchomalacia in trachea or bronchial tubes    Rectal bleeding  exam under anesthesia ([facility]) 2/2018    S/P TAVR (transcatheter aortic valve replacement)    S/P aortic valve replacement    No significant past surgical history    S/P colostomy    S/P AVR (aortic valve replacement)          Medication list         MEDICATIONS  (STANDING):  albuterol/ipratropium for Nebulization 3 milliLiter(s) Nebulizer every 6 hours  apixaban 5 milliGRAM(s) Oral every 12 hours  buDESOnide    Inhalation Suspension 0.5 milliGRAM(s) Inhalation daily  clopidogrel Tablet 75 milliGRAM(s) Oral daily  dextrose 5%. 1000 milliLiter(s) (100 mL/Hr) IV Continuous <Continuous>  dextrose 5%. 1000 milliLiter(s) (50 mL/Hr) IV Continuous <Continuous>  dextrose 50% Injectable 25 Gram(s) IV Push once  dextrose 50% Injectable 12.5 Gram(s) IV Push once  dextrose 50% Injectable 25 Gram(s) IV Push once  ertapenem  IVPB 1000 milliGRAM(s) IV Intermittent every 24 hours  ferrous    sulfate 325 milliGRAM(s) Oral daily  gabapentin 200 milliGRAM(s) Oral three times a day  glucagon  Injectable 1 milliGRAM(s) IntraMuscular once  glycopyrrolate 1 milliGRAM(s) Oral three times a day  insulin glargine Injectable (LANTUS) 25 Unit(s) SubCutaneous at bedtime  insulin lispro (ADMELOG) corrective regimen sliding scale   SubCutaneous three times a day before meals  labetalol 100 milliGRAM(s) Oral every 8 hours  levETIRAcetam 1000 milliGRAM(s) Oral two times a day  megestrol 20 milliGRAM(s) Oral daily  methylPREDNISolone 8 milliGRAM(s) Oral two times a day  mexiletine 200 milliGRAM(s) Oral three times a day  montelukast 10 milliGRAM(s) Oral daily  NIFEdipine XL 30 milliGRAM(s) Oral daily  pantoprazole    Tablet 40 milliGRAM(s) Oral before breakfast  rosuvastatin 5 milliGRAM(s) Oral at bedtime  sertraline 50 milliGRAM(s) Oral daily    MEDICATIONS  (PRN):  albuterol    90 MICROgram(s) HFA Inhaler 2 Puff(s) Inhalation every 6 hours PRN for shortness of breath and/or wheezing  dextrose Oral Gel 15 Gram(s) Oral once PRN Blood Glucose LESS THAN 70 milliGRAM(s)/deciliter         Vitals log        ICU Vital Signs Last 24 Hrs  T(C): 36.7 (20 Feb 2025 10:09), Max: 37.7 (20 Feb 2025 03:27)  T(F): 98 (20 Feb 2025 10:09), Max: 99.9 (20 Feb 2025 03:27)  HR: 60 (20 Feb 2025 10:09) (57 - 81)  BP: 124/74 (20 Feb 2025 10:09) (111/89 - 145/74)  BP(mean): 71 (20 Feb 2025 06:29) (70 - 98)  ABP: --  ABP(mean): --  RR: 20 (20 Feb 2025 10:09) (20 - 23)  SpO2: 96% (20 Feb 2025 10:09) (92% - 100%)    O2 Parameters below as of 20 Feb 2025 10:09  Patient On (Oxygen Delivery Method): nasal cannula  O2 Flow (L/min): 2               Input and Output:  I&O's Detail    20 Feb 2025 07:01  -  20 Feb 2025 13:26  --------------------------------------------------------  IN:  Total IN: 0 mL    OUT:    Voided (mL): 950 mL  Total OUT: 950 mL    Total NET: -950 mL          Lab Data                        11.8   10.58 )-----------( 200      ( 20 Feb 2025 01:50 )             37.8     02-20    139  |  106  |  15  ----------------------------<  116[H]  3.8   |  28  |  0.77    Ca    8.8      20 Feb 2025 01:50    TPro  6.5  /  Alb  3.0[L]  /  TBili  0.5  /  DBili  x   /  AST  16  /  ALT  17  /  AlkPhos  63  02-20            Review of Systems	  weak  frail  alert  on RA  all systems reviewed      Objective     Physical Examination    heart s1s2  lung c BS  head nc  head at  abd soft  verbal  alert  on RA      Pertinent Lab findings & Imaging      Seven:  NO   Adequate UO     I&O's Detail    20 Feb 2025 07:01  -  20 Feb 2025 13:26  --------------------------------------------------------  IN:  Total IN: 0 mL    OUT:    Voided (mL): 950 mL  Total OUT: 950 mL    Total NET: -950 mL               Discussed with:     Cultures:	        Radiology      ACC: [id number] EXAM:  XR CHEST PORTABLE URGENT 1V   ORDERED BY: SUKHJINDER AGUILERA     PROCEDURE DATE:  02/20/2025          INTERPRETATION:  EXAM: XR CHEST URGENT    INDICATION: Sepsis PXR    COMPARISON: February 2, 2025 chest x-ray and CT performed today    IMPRESSION: No focal infiltrate or congestion. Borderline cardiomegaly on   portable exam. Sternotomy noted. Aortic valve stent graft noted. Osseous   structures unchanged    --- End of Report ---            NOHEMY CUETO MD; Attending Radiologist  This document has been electronically signed. Feb 20 2025  9:19AM        CONTRAST/COMPLICATIONS:  IV Contrast: Omnipaque 350  90 cc administered   10 cc discarded  Oral Contrast: NONE  .    PROCEDURE:  CT Angiography of the Chest was performed followed by portal venous phase   imaging of the Abdomen and Pelvis.  Sagittal and coronal reformats were performed as well as 3D (MIP)   reconstructions.    FINDINGS:  CHEST:  LUNGS AND LARGE AIRWAYS: Patent central airways. No pulmonary nodules. No   consolidation or parenchymal infiltration. Motion artifact limits   detailed evaluation of the lung parenchyma.  PLEURA: No pleural effusion.  VESSELS: Known type A aortic dissection starting at the innominate artery   extending to the infrarenal aorta. Extensive atherosclerotic   calcification. Aortic valve replacement.  HEART: Heart size is normal. No pericardial effusion.  MEDIASTINUM AND MARANDA: No lymphadenopathy.  CHEST WALL AND LOWER NECK: Within normal limits.    ABDOMEN AND PELVIS:  LIVER: Within normal limits.  BILE DUCTS: Normal caliber.  GALLBLADDER: Within normal limits.  SPLEEN: Within normal limits.  PANCREAS: No change of hypodense lesions in or around the pancreatic tail.  ADRENALS: Within normal limits.  KIDNEYS/URETERS: Bilateral hypodense lesions with variable attenuation   more prominent on the left. No perinephric stranding or hydronephrosis.    BLADDER: Overdistended. No wall thickening.  REPRODUCTIVE ORGANS: Hysterectomy.    BOWEL: No bowel obstruction. Appendix is not visualized. Status post   rectosigmoid resection with left anterior colostomy. Large stool burden.  PERITONEUM/RETROPERITONEUM: Within normal limits.  VESSELS: Atherosclerotic changes. Dissection flap extending to the   infrarenal aorta.  LYMPH NODES: No lymphadenopathy.  ABDOMINAL WALL: Left lower colostomy.  BONES: Surgical hardware in the right sacroiliac joint and symphysis   pubis. Avascular necrosis of both femoral heads.    IMPRESSION:  No parenchymal infiltration or consolidation.  No intra-abdominal abscess or evidence of enterocolitis.  Known type A aortic dissection.  Stable chronic findings as described.      --- End of Report ---             BESSIE GILES MD; Attending Radiologist  This document has been electronically signed. Feb 20 2025  3:18AM

## 2025-02-20 NOTE — ED PROVIDER NOTE - PROGRESS NOTE DETAILS
Holland Brice MD  CT shows stable type A dissection. However given that pt was febrile and hypoxic to 88% on RA (Which per daughter is not her baseline) will admit pt to tele with cont pulse ox. Endorsed to unattached dr grace padilla who accepted admission. pt and daughter at bedside updated about plan

## 2025-02-20 NOTE — H&P ADULT - PROBLEM SELECTOR PLAN 3
On eliquis 5mg bid and mexelitine 200 tid  - will cont eliquis On eliquis 5mg bid and mexelitine 200 tid, will continue On ELIQUIS 5mg bid and Mexiletine 200 tid, will continue

## 2025-02-20 NOTE — PROGRESS NOTE ADULT - PROBLEM SELECTOR PLAN 4
On eliquis 5mg bid and mexelitine 200 tid, will continue On ELIQUIS 5mg bid and Mexiletine 200 tid, will continue

## 2025-02-20 NOTE — ED PROVIDER NOTE - OBJECTIVE STATEMENT
74F PMH Epilepsy on Keppra, COPD, asthma on chronic steroids, bronchiectasis, tracheomalacia s/p tracheloplasty HTN, Hx of dissection of descending thoracic aorta, , hx of aortic aneurysm,  Type B dissection (7/2024), Type A dissection s/p bioAVR with Bental procedure (9/2022), severe AS s/p TAVR (9/10/2023), TIA's, DVT Afib on Eliquis, T2DM, Colon cancer, neuropathy BIBEMS from home for fever and hypoxia. Pt endorses a cough for the past 3 days. Daughter notes that pt has been weaker and sleeping more. Pt has recurrent pna per daughter. She can get ertapenem but usually needs benadryl afterwards. Pt currently on doxy by her pcp

## 2025-02-20 NOTE — PROVIDER CONTACT NOTE (HYPOGLYCEMIA EVENT) - NS PROVIDER CONTACT BACKGROUND-HYPO
Age: 74y    Gender: Female    POCT Blood Glucose:  77 mg/dL (02-20-25 @ 08:59)  74 mg/dL (02-20-25 @ 08:47)  63 mg/dL (02-20-25 @ 08:27) Dr Faith at bedside, per md pt ok to continue po, no iv  52 mg/dL (02-20-25 @ 08:14) pt remains aaox4 eating crackers, attempting to contact treatment team   54 mg/dL (02-20-25 @ 08:11) recheck in progress pt continues eating   70 mg/dL (02-20-25 @ 08:03) pt aaox4 awake and talking, provided with  juice and yusef crackers.       eMAR:  methylPREDNISolone   8 milliGRAM(s) Oral (02-20-25 @ 06:42)    pt now remains aaox4 resp easy unlabored denies cp/sob. Dr Faith aware of hypoglycemic event.

## 2025-02-20 NOTE — H&P ADULT - ASSESSMENT
74yFemale pmhx of Epilepsy on Keppra, COPD,, DM2  asthma on chronic steroids, bronchiectasis, tracheomalacia s/p tracheloplasty HTN, Hx of dissection of descending thoracic aorta, , hx of aortic aneurysm,  Type B 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  Evaluated by Dr. Antonio,  Type A dissection s/p bioAVR with Bental procedure (9/2022), severe AS s/p TAVR (9/10/2023), TIA's, DVT Afib on Eliquis, T2DM, Colon cancer, neuropathy, presents to the ED for fever. Admitting for fever of unknown origin.  74yFemale pmhx of Epilepsy on Keppra, COPD,, DM2  asthma on chronic steroids, bronchiectasis, tracheomalacia s/p tracheloplasty HTN, Hx of dissection of descending thoracic aorta, , hx of aortic aneurysm,  Type B 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  Evaluated by Dr. Antonio,  Type A dissection s/p bioAVR with Bental procedure (9/2022), severe AS s/p TAVR (9/10/2023), TIA's, DVT Afib on Eliquis, T2DM, Colon cancer S/P Colostomy , neuropathy, presents to the ED for fever. Admitting for Hypoxia at home, Possible COPD Exacerbation

## 2025-02-20 NOTE — ED ADULT NURSE NOTE - OBJECTIVE STATEMENT
received pt. from EMS Triage w/ c/o cough, fever and loss of appetite for the past 3 days as per the daughter, on Eliquis, walks with the use of rollator as her baseline, saturating at 92-93% on room-air, safety maintained and applied.

## 2025-02-20 NOTE — H&P ADULT - GASTROINTESTINAL
details… normal/soft/nontender/nondistended/normal active bowel sounds Scar+/normal/soft/nontender/nondistended/normal active bowel sounds/no guarding/no rigidity/no organomegaly/no palpable em/no masses palpable

## 2025-02-20 NOTE — H&P ADULT - NEGATIVE RESPIRATORY AND THORAX SYMPTOMS
no wheezing/no cough/no pleuritic chest pain no wheezing/no cough/no hemoptysis/no pleuritic chest pain

## 2025-02-20 NOTE — H&P ADULT - PROBLEM SELECTOR PLAN 4
Chronic on inhalers and prednisone  - Cont home inhalers and steroids  - Start solumedrol 20mg IV Q8h  - Breo interchange will start Advair  -Cont Duoneb prn wheezing.  - Pulm Dr Luis consulted recs appreciated Chronic on inhalers and prednisone  - Cont home inhalers and steroids  - Cont home prednisone  - Breo interchange will start Advair  -Cont Duoneb prn wheezing.  - Pulm Dr Luis consulted recs appreciated Chronic on inhalers and prednisone  - Cont home inhalers and steroids  - Cont home Methyl prednisone 2x day  - Breo interchange will start Advair  -Cont Duoneb prn wheezing.  - Pulm Dr Luis consulted recs appreciated

## 2025-02-20 NOTE — CONSULT NOTE ADULT - ASSESSMENT
74yFemale pmhx of Epilepsy on Keppra, COPD,, DM2  asthma on chronic steroids, bronchiectasis, tracheomalacia s/p tracheloplasty HTN, Hx of dissection of descending thoracic aorta, , hx of aortic aneurysm,  Type B 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  Evaluated by Dr. Antonio,  Type A dissection s/p bioAVR with Bental procedure (9/2022), severe AS s/p TAVR (9/10/2023), TIA's, DVT Afib on Eliquis, T2DM, Colon cancer, neuropathy, presents to the ED for shortness of breath. Patient reports for the past 1-2 days having increased chest pressure out of her baseline, states she has also had cough, wheezing and shortness of breath with minimal relief after using her normal regimen of inhalers and nebulizers.    valv heart disease  seizure disorder  copd  dm  asthma  OP  OA  bronchiectasis  tracheomalacia  HTN  DVT  AFIB  Colon Ca hx  Neuropathy   74yFemale pmhx of Epilepsy on Keppra, COPD,, DM2  asthma on chronic steroids, bronchiectasis, tracheomalacia s/p tracheloplasty HTN, Hx of dissection of descending thoracic aorta, , hx of aortic aneurysm,  Type B 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  Evaluated by Dr. Antonio,  Type A dissection s/p bioAVR with Bental procedure (9/2022), severe AS s/p TAVR (9/10/2023), TIA's, DVT Afib on Eliquis, T2DM, Colon cancer, neuropathy, presents to the ED for shortness of breath. Patient reports for the past 1-2 days having increased chest pressure out of her baseline, states she has also had cough, wheezing and shortness of breath with minimal relief after using her normal regimen of inhalers and nebulizers.    valv heart disease  seizure disorder  copd  dm  asthma  OP  OA  bronchiectasis  tracheomalacia  HTN  DVT  AFIB  Colon Ca hx  Neuropathy    no acute chest pathology  pt follows with Dr Keegan Mcarthur [facility]  I nadiya  goal sat > 88 pct  bronchodilators  [facility] rx regimen  BP control  eval febrile illness  no evidence of PNA  dietary discretion  pt is on home dose systemic steroids

## 2025-02-20 NOTE — PATIENT PROFILE ADULT - SAFE PLACE TO LIVE
Problem: Risk for Delirium  Goal: Optimal Coping  Outcome: Improving  Cares have been clustered to promote rest. The pt is disoriented to time; wanting to get ready to go home.  Able to easily reorient to time.     LS are diminished in bilateral lobes; cough is congested/non-productive. Oxygen saturation 92% at 2 lpm/NC.   no

## 2025-02-20 NOTE — CARE COORDINATION ASSESSMENT. - NSPASTMEDSURGHISTORY_GEN_ALL_CORE_FT
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      History of sinus surgery  multiple sinus surgeries      H/O total knee replacement, bilateral  5 years ago      History of partial hysterectomy  30 years ago - fibroids      H/O pelvic surgery  5 years ago - s/p fracture      Seizure  x 1 1/7/18      TIA (transient ischemic attack)  multiple, last 5 years ago - presents as right-sided weakness      DVT (deep venous thrombosis)  15-20 years ago, took coumadin      Rectal bleeding  exam under anesthesia ([facility]) 2/2018      S/P bronchoscopy  6/5/2018 - [place] (Dr Zapien) no evidence of tracheobronchomalacia in trachea or bronchial tubes      History of tracheomalacia  2015 - attempted tracheal stenting ([facility])- course complicated by obstruction, respiratory failure, multiple CPR attempts -  stent discontinued; 10/20/2016 Tracheobronchoplasty (Prolene Mesh) performed at [facility] by Dr Zapien      S/P aortic valve replacement      S/P TAVR (transcatheter aortic valve replacement)      Atrial fibrillation      COPD (chronic obstructive pulmonary disease)      Bronchiectasis      HTN (hypertension)      History of TIAs      History of seizure disorder      DM (diabetes mellitus)      Asthma      Epilepsy      Aortic valve replaced      Afib      History of COPD      S/P AVR (aortic valve replacement)      S/P colostomy

## 2025-02-20 NOTE — H&P ADULT - NEUROLOGICAL
normal/cranial nerves II-XII intact/sensation intact details… AAox 4/normal/cranial nerves II-XII intact/sensation intact

## 2025-02-20 NOTE — CARE COORDINATION ASSESSMENT. - REASON FOR CONSULT
Pt lethargic at the time of assessment, unable to participate in assessment at this time. SW reached out to pt's daughter /CDPAP caregiver/Zoe Mark at ([phone]) in order to conduct assessment and to discuss SW roles with good understanding./coordination of care/discharge planning

## 2025-02-20 NOTE — ED ADULT NURSE REASSESSMENT NOTE - NS ED NURSE REASSESS COMMENT FT1
handover done to RADHA Penny for continuity of care,   pt. is for admission and have bed going to Telemetry lance,   tried to call and give report 2x but no response,   ONEAL Gudino is aware of it.

## 2025-02-20 NOTE — PATIENT PROFILE ADULT - FALL HARM RISK - FALLEN IN PAST
This 81 y.o. F with COPD exacerbation; found with positive urine culture, asymptomatic.   still with cough and WBC elevation.   no evidence of PNA on clinical or radiological exam. No

## 2025-02-20 NOTE — CARE COORDINATION ASSESSMENT. - PATIENT WAS INVOLVED WITH A HOSPICE AGENCY PRIOR TO ADMISSION?
Requested Prescriptions   Pending Prescriptions Disp Refills     dexmethylphenidate (FOCALIN) 10 MG tablet 60 tablet 0     Sig: Take 1 tablet (10 mg) by mouth 2 times daily       There is no refill protocol information for this order        Last Written Prescription Date:  2/17/22  Last Fill Quantity: 60,  # refills: 0   Last office visit: 5/17/2022 with prescribing provider:     Future Office Visit:            
No

## 2025-02-20 NOTE — CARE COORDINATION ASSESSMENT. - NSCAREPROVIDERS_GEN_ALL_CORE_FT
CARE PROVIDERS:  Accepting Physician: Dio Boothe  Access Services: Jessica Villagomez  Administration: Kristian Levin  Administration: Rajat Varela  Administration: Terese Coburn  Admitting: Dio Boothe  Attending: Dio Boothe  Case Management: Yrn Kessler  Consultant: Scott Gruber  Consultant: Mahamed Luis  Consultant: Moer Stoo  Covering Team: Elida Spain  Covering Team: Jigar Cavanaugh ED Attending: Holland Brice ED Nurse: Hemalatha Claros  Nurse: Kim Allen  Nurse: Melvina Walters  Nurse: Kendra Gutierrez  Nurse: Hemalatha Claros  Nurse: Terrell Choudhury  Nurse: Milly Almonte  Oncology: Nadine Duenas  Oncology: Tavo Blanchard  Oncology: Laila Hernandez  Oncology: Curt Camara  Outpatient Provider: Eulalio Allison  Outpatient Provider: Opal Da Silva  Outpatient Provider: Zohaib Zapien  Outpatient Provider: Reva Gonzalez  Outpatient Provider: Matteo Mike  Override: Kendra Gutierrez  PCA/Nursing Assistant: Lady Wicho  Physical Therapy: Rayray Olivarez  Physical Therapy: Darius Ignacio  Primary Team: Sergio Faith  Respiratory Therapy: Bradley Kyle  : Sharonda Hall  : Michelle Lacy  UR// Supp. Assoc.: Solange Love  UR// Supp. Assoc.: Fletcher Mcintosh

## 2025-02-20 NOTE — ED PROVIDER NOTE - MDM ORDERS SUBMITTED SELECTION
Imaging Studies/Medications Crescentic Advancement Flap Text: The defect edges were debeveled with a #15 scalpel blade.  Given the location of the defect and the proximity to free margins a crescentic advancement flap was deemed most appropriate.  Using a sterile surgical marker, the appropriate advancement flap was drawn incorporating the defect and placing the expected incisions within the relaxed skin tension lines where possible.    The area thus outlined was incised deep to adipose tissue with a #15 scalpel blade.  The skin margins were undermined to an appropriate distance in all directions utilizing iris scissors.

## 2025-02-20 NOTE — ED ADULT NURSE NOTE - ED STAT RN HANDOFF DETAILS
Pt aaox4 resp easy unlabored at this time on ra, denies cp. no sob at rest  report endorsed to RADHA Kaur via phone. no ss of distress. pt is nsr on monitor 55. Made RN aware meds held on previous shift.

## 2025-02-20 NOTE — ED PROVIDER NOTE - CLINICAL SUMMARY MEDICAL DECISION MAKING FREE TEXT BOX
74F p/w fever and hypoxia. VS and exam as above. ECG nondiagnostic  DDx includes but not limited to: pna vs viral uri vs dehydration vs carina vs lyte derangements vs acs  Plan: cardiac labs, cta chest, abx, reassess symptoms    See Progress Notes for further updates on ED Course

## 2025-02-20 NOTE — PATIENT PROFILE ADULT - ARRIVAL FROM
S[employer] Pregnancy And Lactation Text: This medication is Pregnancy Category B and is considered safe. There is some data to suggest avoiding during the third trimester. It is unknown if this medication is excreted in breast milk. Home

## 2025-02-20 NOTE — CONSULT NOTE ADULT - SUBJECTIVE AND OBJECTIVE BOX
SURGERY PA CONSULT NOTE:    CHIEF COMPLAINT:  Patient is a 74y old  Female who presents with a chief complaint of Fever (20 Feb 2025 16:15)    HPI FROM ED:  74yFemale pmhx of Epilepsy on Keppra, COPD,, DM2  asthma on chronic steroids, bronchiectasis, tracheomalacia s/p tracheloplasty HTN, Hx of dissection of descending thoracic aorta, , hx of aortic aneurysm,  Type B 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  Evaluated by Dr. Antonio,  Type A dissection s/p bioAVR with Bental procedure (9/2022), severe AS s/p TAVR (9/10/2023), TIA's, DVT Afib on Eliquis, T2DM, Colon cancer S/P resection, Colostomy bag , neuropathy, presents to the ED for fever. Pt reports having past 2-3 days of cough associated with fever. Patient endorses increased weakness and fatigue. Patient has hx of recurrent PNA recently admitted for COPD exacerbation 1/8/25  Currently on Doxycycline prescribed by PCP    INTERVAL HPI:   74yFemale pmhx of Epilepsy on Keppra, COPD,, DM2  asthma on chronic steroids, bronchiectasis, tracheomalacia s/p tracheloplasty HTN, Hx of dissection of descending thoracic aorta, , hx of aortic aneurysm,  Type B dissection (7/2024), Type A dissection s/p bioAVR with Bental procedure (9/2022), severe AS s/p TAVR (9/10/2023), TIA's, DVT Afib on Eliquis, T2DM, Colon cancer S/P resection, Colostomy bag , neuropathy admitted for hypoxia at home, Possible COPD Exacerbation, general surgery consulted for change in abdominal exam with pain distention and no ostomy output. Patient states that she hasnt noticed a BM for 2 days but had air in the ostomy. Patient notice abdominal distention today.    PAST MEDICAL HISTORY:  Seizurex 1 1/7/18  DVT (deep venous thrombosis)15-20 years ago, took coumadin  TIA (transient ischemic attack)multiple, last 5 years ago - presents as right-sided weakness  COPD (chronic obstructive pulmonary disease)  Atrial fibrillation  History of COPD  Afib  Aortic valve replaced  Epilepsy  Asthma  DM (diabetes mellitus)  History of seizure disorder  History of TIAs  HTN (hypertension)  Bronchiectasis  Colon cancer  History of partial hysterectomy 30 years ago - fibroids  H/O total knee replacement, bilateral 5 years ago  History of sinus surgery; multiple sinus surgeries  Exostosis of orbit, left; 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  H/O pelvic surgery 5 years ago - s/p fracture  History of tracheomalacia; 2015 - attempted tracheal stenting ([facility])- course complicated by obstruction, respiratory failure, multiple CPR attempts -  stent discontinued; 10/20/2016 Tracheobronchoplasty (Prolene Mesh) performed at [facility] by Dr Zapien    PAST SURGICAL HISTORY:  S/P bronchoscopy; 6/5/2018 - [facility] (Dr Zapien) no evidence of tracheobronchomalacia in trachea or bronchial tubes  Rectal bleeding; exam under anesthesia ([facility]) 2/2018  S/P TAVR (transcatheter aortic valve replacement)  S/P aortic valve replacement  S/P colostomy  S/P AVR (aortic valve replacement)    REVIEW OF SYSTEMS:  See HPI    MEDICATIONS:  Home Medications:  Albuterol (Eqv-ProAir HFA) 90 mcg/inh inhalation aerosol: 2 puff(s) inhaled every 6 hours as needed for  shortness of breath and/or wheezing (20 Feb 2025 05:26)  Basaglar KwikPen 100 units/mL subcutaneous solution: 30 unit(s) subcutaneous once a day (20 Feb 2025 05:35)  Basaglar KwikPen 100 units/mL subcutaneous solution: 18 unit(s) subcutaneous once a day (at bedtime) (20 Feb 2025 05:35)  Breo Ellipta 200 mcg-25 mcg/inh inhalation powder: 1 puff(s) inhaled once a day (20 Feb 2025 05:33)  budesonide 1 mg/2 mL inhalation suspension: 2 milliliter(s) by nebulizer once a day (20 Feb 2025 05:33)  clopidogrel 75 mg oral tablet: 1 tab(s) orally once a day (20 Feb 2025 05:33)  DuoNeb 0.5 mg-2.5 mg/3 mL inhalation solution: 3 milliliter(s) by nebulizer once a day (20 Feb 2025 05:33)  Eliquis 5 mg oral tablet: 1 tab(s) orally 2 times a day (20 Feb 2025 05:28)  ferrous sulfate: 325 milligram(s) orally once a day (20 Feb 2025 05:33)  gabapentin 100 mg oral capsule: 200 milligram(s) orally 3 times a day (20 Feb 2025 05:33)  glycopyrrolate 1 mg oral tablet: 1 tab(s) orally 3 times a day (20 Feb 2025 05:33)  Keppra 500 mg oral tablet: 2 tab(s) orally 2 times a day (20 Feb 2025 05:33)  labetalol 100 mg oral tablet: 1 tab(s) orally every 8 hours (20 Feb 2025 05:33)  megestrol 20 mg oral tablet: 1 tab(s) orally once a day (20 Feb 2025 05:35)  methylPREDNISolone 8 mg oral tablet: 1 tab(s) orally 2 times a day (20 Feb 2025 05:33)  mexiletine 200 mg oral capsule: 1 cap(s) orally 3 times a day (20 Feb 2025 05:33)  montelukast 10 mg oral tablet: 1 tab(s) orally once a day (20 Feb 2025 05:33)  NIFEdipine 30 mg oral tablet, extended release: 1 tab(s) orally once a day (20 Feb 2025 05:33)  pantoprazole 40 mg oral delayed release tablet: 1 tab(s) orally once a day (20 Feb 2025 05:33)  Perforomist 20 mcg/2 mL inhalation solution: 2 milliliter(s) inhaled once a day (20 Feb 2025 05:33)  rosuvastatin 5 mg oral tablet: 1 tab(s) orally once a day (at bedtime) (20 Feb 2025 05:33)  sertraline 50 mg oral tablet: 1 tab(s) orally once a day (20 Feb 2025 05:33)  [place] Pre-filled Pen 210 mg/1.91 mL subcutaneous solution: 210 milligram(s) subcutaneously (20 Feb 2025 05:33)    MEDICATIONS  (STANDING):  albuterol/ipratropium for Nebulization 3 milliLiter(s) Nebulizer every 6 hours  apixaban 5 milliGRAM(s) Oral every 12 hours  buDESOnide    Inhalation Suspension 0.5 milliGRAM(s) Inhalation daily  clopidogrel Tablet 75 milliGRAM(s) Oral daily  dextrose 5%. 1000 milliLiter(s) (100 mL/Hr) IV Continuous <Continuous>  dextrose 5%. 1000 milliLiter(s) (50 mL/Hr) IV Continuous <Continuous>  dextrose 50% Injectable 25 Gram(s) IV Push once  dextrose 50% Injectable 12.5 Gram(s) IV Push once  dextrose 50% Injectable 25 Gram(s) IV Push once  ferrous    sulfate 325 milliGRAM(s) Oral daily  gabapentin 200 milliGRAM(s) Oral three times a day  glucagon  Injectable 1 milliGRAM(s) IntraMuscular once  glycopyrrolate 1 milliGRAM(s) Oral three times a day  insulin glargine Injectable (LANTUS) 25 Unit(s) SubCutaneous at bedtime  insulin lispro (ADMELOG) corrective regimen sliding scale   SubCutaneous three times a day before meals  labetalol 100 milliGRAM(s) Oral every 8 hours  levETIRAcetam 1000 milliGRAM(s) Oral two times a day  megestrol 20 milliGRAM(s) Oral daily  methylPREDNISolone 8 milliGRAM(s) Oral two times a day  mexiletine 200 milliGRAM(s) Oral three times a day  mineral oil 30 milliLiter(s) Oral daily  montelukast 10 milliGRAM(s) Oral daily  NIFEdipine XL 30 milliGRAM(s) Oral daily  pantoprazole    Tablet 40 milliGRAM(s) Oral before breakfast  polyethylene glycol 3350 17 Gram(s) Oral daily  rosuvastatin 5 milliGRAM(s) Oral at bedtime  senna 2 Tablet(s) Oral at bedtime  sertraline 50 milliGRAM(s) Oral daily    MEDICATIONS  (PRN):  albuterol    90 MICROgram(s) HFA Inhaler 2 Puff(s) Inhalation every 6 hours PRN for shortness of breath and/or wheezing  dextrose Oral Gel 15 Gram(s) Oral once PRN Blood Glucose LESS THAN 70 milliGRAM(s)/deciliter      ALLERGIES:  aspirin (Short breath)  cefepime (Anaphylaxis)  meropenem (Unknown)  Percocet (Unknown)  Avelox (Unknown)  shellfish (Anaphylaxis)  aspirin (Unknown)  OxyContin (Unknown)  penicillin (Unknown)  codeine (Unknown)  Valium (Unknown)  iodine (Short breath; Swelling)  tetanus toxoid (Short breath)  Valium (Short breath)  penicillin (Anaphylaxis)  Dilaudid (Short breath)  codeine (Short breath)  Avelox (Short breath; Pruritus)  ampicillin (Unknown)  cefepime (Short breath (Severe))    SOCIAL HISTORY:  Social History:  Lives at home with daughter, requires assistance to ambulate with walker  No alcohol use, never smoker  Not working (20 Feb 2025 04:55)    FAMILY HISTORY:  Family history of asthma  Family history of breast cancer (Sibling)  Family history of diabetes mellitus type II    VITAL SIGNS:  Vital Signs Last 24 Hrs  T(C): 36.8 (20 Feb 2025 21:07), Max: 37.7 (20 Feb 2025 03:27)  T(F): 98.3 (20 Feb 2025 21:07), Max: 99.9 (20 Feb 2025 03:27)  HR: 61 (20 Feb 2025 21:07) (57 - 83)  BP: 132/70 (20 Feb 2025 21:07) (111/89 - 145/74)  BP(mean): 71 (20 Feb 2025 06:29) (70 - 98)  RR: 18 (20 Feb 2025 21:07) (18 - 23)  SpO2: 93% (20 Feb 2025 21:07) (92% - 100%)    Parameters below as of 20 Feb 2025 21:07  Patient On (Oxygen Delivery Method): room air    PHYSICAL EXAM:  GENERAL: NAD, lying in bed comfortably  HEAD:  Atraumatic, normocephalic  EYES: EOMI, PERRLA, conjunctiva and sclera clear  NECK: Supple, trachea midline, no JVD  HEART: Regular rate and rhythm  LUNGS: Unlabored respirations.  ABDOMEN: Soft, nontender, distended, without guarding/ rebound/ referred pain. LLQ stoma patent with air in appliance. Digitally explored; admits 2 cm finger; no formed stool within SQ reservoir; admitting finger below fascia into abdomen  EXTREMITIES: No calf tenderness bilaterally  NERVOUS SYSTEM:  A&Ox3  SKIN: No rashes or lesions    LABS:                        11.8   10.58 )-----------( 200      ( 20 Feb 2025 01:50 )             37.8     02-20    139  |  106  |  15  ----------------------------<  116[H]  3.8   |  28  |  0.77    Ca    8.8      20 Feb 2025 01:50    TPro  6.5  /  Alb  3.0[L]  /  TBili  0.5  /  DBili  x   /  AST  16  /  ALT  17  /  AlkPhos  63  02-20    PT/INR - ( 20 Feb 2025 01:50 )   PT: 17.2 sec;   INR: 1.46 ratio      PTT - ( 20 Feb 2025 01:50 )  PTT:34.7 sec  Urinalysis Basic - ( 20 Feb 2025 01:50 )    Color: x / Appearance: x / SG: x / pH: x  Gluc: 116 mg/dL / Ketone: x  / Bili: x / Urobili: x   Blood: x / Protein: x / Nitrite: x   Leuk Esterase: x / RBC: x / WBC x   Sq Epi: x / Non Sq Epi: x / Bacteria: x    LIVER FUNCTIONS - ( 20 Feb 2025 01:50 )  Alb: 3.0 g/dL / Pro: 6.5 g/dL / ALK PHOS: 63 U/L / ALT: 17 U/L / AST: 16 U/L / GGT: x           RADIOLOGY & ADDITIONAL STUDIES:  < from: CT Abdomen and Pelvis No Cont (02.20.25 @ 20:08) >    ACC: [id number] EXAM:  CT ABDOMEN AND PELVIS   ORDERED BY: EUGENIA BAXTER     PROCEDURE DATE:  02/20/2025      INTERPRETATION:  CLINICAL INFORMATION: Abdominal pain.  History colostomy.    COMPARISON: CT scan 2/20/2025 performed earlier in the day.    CONTRAST/COMPLICATIONS:  IV Contrast: NONE  Oral Contrast: NONE    PROCEDURE:  CT of the Abdomen and Pelvis was performed.  Sagittal and coronal reformats were performed.    FINDINGS:    LOWER CHEST:  Sternotomy.  Cardiomegaly.    Mild bilateral lower lobe bronchial wall thickening which tree-in-bud nodular opacities right lower lobe, findings which may reflect infectious or inflammatory small airway disease.    The evaluation of the solid organ parenchyma and vasculature is limited without intravenous contrast.    LIVER: Within normal limits.  BILE DUCTS: Normal caliber.  GALLBLADDER: Vicarious excretion.  SPLEEN: Within normal limits.  PANCREAS: Multiple cystic lesions tail, better delineated on prior contrast exam.  ADRENALS:Within normal limits.  KIDNEYS/URETERS:  Excreted contrast within the bilateral renal collecting system.  Small calcification in left interpolar kidney.  Indeterminate hypodense lesion at the lower pole left kidney.    Metallic streak artifact related to patient's orthopedic hardware   degrades image quality limiting evaluation pelvis.    BLADDER: Distended with excreted contrast.  REPRODUCTIVE ORGANS: Hysterectomy.    BOWEL:  PERITONEUM/RETROPERITONEUM:  Left-sided colostomy. Colonic distention with fecal retention; no bowel obstruction.  No extraluminal gas or drainable fluid collection.    VESSELS: Atherosclerotic changes.  Displaced intimal calcification distal descending aorta, compatible with   known dissection.  LYMPH NODES: No lymphadenopathy.    ABDOMINAL WALL:  Small fat-containing umbilical hernia.    BONES:  Postsurgical changes right sacroiliac joint and parasymphyseal pubis.  Osteonecrosis bilateral femoral heads.    IMPRESSION:    Limited noncontrast exam.  Colonic distention with fecal retention; no small bowel obstruction.  Other findings as discussed above.    --- End of Report ---        ASSESSMENT:  74y Female pmhx of Epilepsy on Keppra, COPD,, DM2  asthma on chronic steroids, bronchiectasis, tracheomalacia s/p tracheloplasty HTN, Hx of dissection of descending thoracic aorta, , hx of aortic aneurysm,  Type B dissection (7/2024), Type A dissection s/p bioAVR with Bental procedure (9/2022), severe AS s/p TAVR (9/10/2023), TIA's, DVT Afib on Eliquis, T2DM, Colon cancer S/P resection, Colostomy bag, neuropathy admitted for hypoxia. general surgery consulted for change in abdominal exam with pain distention and no ostomy output. Abdomen quite distended, but softly without any signs of peritonitis. Repeat CT scan demonstrated Left-sided colostomy. Colonic distention with fecal retention; no bowel obstruction.    PLAN:  - constipation/fecal retention  - bowel regimen  - ordered Miralax mineral oil, and senna  - Stable from surgical standpoint  - Will follow until BM production   - Case discussed with Dr. Benavides

## 2025-02-20 NOTE — H&P ADULT - NSICDXPASTMEDICALHX_GEN_ALL_CORE_FT
PAST MEDICAL HISTORY:  Afib     Aortic valve replaced     Asthma     Atrial fibrillation     Bronchiectasis     COPD (chronic obstructive pulmonary disease)     DM (diabetes mellitus)     DVT (deep venous thrombosis) 15-20 years ago, took coumadin    Epilepsy     History of COPD     History of seizure disorder     History of TIAs     HTN (hypertension)     Seizure x 1 1/7/18    TIA (transient ischemic attack) multiple, last 5 years ago - presents as right-sided weakness     PAST MEDICAL HISTORY:  Afib     Aortic valve replaced     Asthma     Atrial fibrillation     Bronchiectasis     Colon cancer     COPD (chronic obstructive pulmonary disease)     DM (diabetes mellitus)     DVT (deep venous thrombosis) 15-20 years ago, took coumadin    Epilepsy     History of COPD     History of seizure disorder     History of TIAs     HTN (hypertension)     Seizure x 1 1/7/18    TIA (transient ischemic attack) multiple, last 5 years ago - presents as right-sided weakness

## 2025-02-20 NOTE — PROGRESS NOTE ADULT - PROBLEM SELECTOR PLAN 5
Chronic on inhalers and prednisone  - Cont home inhalers and steroids  - Cont home prednisone  - Breo interchange will start Advair  -Cont Duoneb prn wheezing.  - Pulm Dr Luis consulted recs appreciated Chronic on inhalers and prednisone  - Cont home inhalers and steroids  - Cont home methyl prednisone 2x day  - Breo interchange will start Advair  -Cont Duoneb prn wheezing.  - Pulm Dr Luis d/w -No concern for PNA, All chronic changes on CT

## 2025-02-20 NOTE — PROGRESS NOTE ADULT - ASSESSMENT
74yFemale pmhx of Epilepsy on Keppra, COPD,, DM2  asthma on chronic steroids, bronchiectasis, tracheomalacia s/p tracheloplasty HTN, Hx of dissection of descending thoracic aorta, , hx of aortic aneurysm,  Type B 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  Evaluated by Dr. Antonio,  Type A dissection s/p bioAVR with Bental procedure (9/2022), severe AS s/p TAVR (9/10/2023), TIA's, DVT Afib on Eliquis, T2DM, Colon cancer, neuropathy, presents to the ED for fever. Admitting for fever of unknown origin.

## 2025-02-20 NOTE — PROGRESS NOTE ADULT - PROBLEM SELECTOR PLAN 3
Hx of COPD not on home O2  - On steroids, will continue prednisone  - Cont home nebs inhalers Hx of COPD not on home O2  - On steroids, will continue methyl prednisone 2x day  - Cont home nebs inhalers

## 2025-02-20 NOTE — H&P ADULT - PROBLEM SELECTOR PLAN 2
Hx of COPD not on home O2  - On steroids, will continue prednisone  - Cont home nebs inhalers Hx of COPD not on home O2  - On steroids, will continue home meds prednisone 2x day  - Cont home nebs inhalers

## 2025-02-20 NOTE — PROGRESS NOTE ADULT - PROBLEM SELECTOR PLAN 9
Chronic  - On home Lantus 30U in pm, 18 in AM, Novolog sliding scale  - Lantus 25u qhs low dose iss, fingersticks, hypoglycemia protocol  - F/u AM A1C.

## 2025-02-20 NOTE — PROGRESS NOTE ADULT - SUBJECTIVE AND OBJECTIVE BOX
Patient is a 74y old  Female who presents with a chief complaint of Fever (20 Feb 2025 13:25)    HPI:  74yFemale pmhx of Epilepsy on Keppra, COPD,, DM2  asthma on chronic steroids, bronchiectasis, tracheomalacia s/p tracheloplasty HTN, Hx of dissection of descending thoracic aorta, , hx of aortic aneurysm,  Type B 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  Evaluated by Dr. Antonio,  Type A dissection s/p bioAVR with Bental procedure (9/2022), severe AS s/p TAVR (9/10/2023), TIA's, DVT Afib on Eliquis, T2DM, Colon cancer, neuropathy, presents to the ED for fever. Pt reports having past 2-3 days of cough associated with fever. Patient endorses increased weakness and fatigue. Patient has hx of recurrent PNA recently admitted for COPD exacerabation. Currently on Doxycycline prescribed by PCP    ED course  Vitals: T: 99.9F , HR: 60 , BP: 111/89 , RR: 20 , SpO2:  100% on RA  Significant labs: WBC 10.58, Hgb 11.8, Hct 37.8,  Na 139, K 3.8, BUN 15, Cr .77  Lactate 1.3, ProCalcitonin .08  Troponin 28.2  ProBNP 1896  Imaging:   CT ANGIO PE, ABD, PELV IV   No parenchymal infiltration or consolidation.  No intra-abdominal abscess or evidence of enterocolitis.  Known type A aortic dissection.  Stable chronic findings as described.    In ED patient given: OFIRMEV, ertapenem, benadryl, 1L NS Bolus     (20 Feb 2025 04:55)    INTERVAL HPI:  TODAY- Patient admitted today. Pt was seen and examined at bedside. Pt was hypoglycemic this morning, given juice and crackers with improvement to blood sugar. Pt states that she has lower abdominal pain and has not been able to urinate since her arrival. Denies headache, dizziness, lightheadedness, fever, chills, body aches, CP, SOB, palpitations, n/v, numbness/tingling. No other complaints at this time.     OVERNIGHT EVENTS: No acute overnight events.     Home Medications:  Albuterol (Eqv-ProAir HFA) 90 mcg/inh inhalation aerosol: 2 puff(s) inhaled every 6 hours as needed for  shortness of breath and/or wheezing (20 Feb 2025 05:26)  Basaglar KwikPen 100 units/mL subcutaneous solution: 30 unit(s) subcutaneous once a day (20 Feb 2025 05:35)  Basaglar KwikPen 100 units/mL subcutaneous solution: 18 unit(s) subcutaneous once a day (at bedtime) (20 Feb 2025 05:35)  Breo Ellipta 200 mcg-25 mcg/inh inhalation powder: 1 puff(s) inhaled once a day (20 Feb 2025 05:33)  budesonide 1 mg/2 mL inhalation suspension: 2 milliliter(s) by nebulizer once a day (20 Feb 2025 05:33)  clopidogrel 75 mg oral tablet: 1 tab(s) orally once a day (20 Feb 2025 05:33)  DuoNeb 0.5 mg-2.5 mg/3 mL inhalation solution: 3 milliliter(s) by nebulizer once a day (20 Feb 2025 05:33)  Eliquis 5 mg oral tablet: 1 tab(s) orally 2 times a day (20 Feb 2025 05:28)  ferrous sulfate: 325 milligram(s) orally once a day (20 Feb 2025 05:33)  gabapentin 100 mg oral capsule: 200 milligram(s) orally 3 times a day (20 Feb 2025 05:33)  glycopyrrolate 1 mg oral tablet: 1 tab(s) orally 3 times a day (20 Feb 2025 05:33)  Keppra 500 mg oral tablet: 2 tab(s) orally 2 times a day (20 Feb 2025 05:33)  labetalol 100 mg oral tablet: 1 tab(s) orally every 8 hours (20 Feb 2025 05:33)  megestrol 20 mg oral tablet: 1 tab(s) orally once a day (20 Feb 2025 05:35)  methylPREDNISolone 8 mg oral tablet: 1 tab(s) orally 2 times a day (20 Feb 2025 05:33)  mexiletine 200 mg oral capsule: 1 cap(s) orally 3 times a day (20 Feb 2025 05:33)  montelukast 10 mg oral tablet: 1 tab(s) orally once a day (20 Feb 2025 05:33)  NIFEdipine 30 mg oral tablet, extended release: 1 tab(s) orally once a day (20 Feb 2025 05:33)  pantoprazole 40 mg oral delayed release tablet: 1 tab(s) orally once a day (20 Feb 2025 05:33)  Perforomist 20 mcg/2 mL inhalation solution: 2 milliliter(s) inhaled once a day (20 Feb 2025 05:33)  rosuvastatin 5 mg oral tablet: 1 tab(s) orally once a day (at bedtime) (20 Feb 2025 05:33)  sertraline 50 mg oral tablet: 1 tab(s) orally once a day (20 Feb 2025 05:33)  [place] Pre-filled Pen 210 mg/1.91 mL subcutaneous solution: 210 milligram(s) subcutaneously (20 Feb 2025 05:33)      MEDICATIONS  (STANDING):  albuterol/ipratropium for Nebulization 3 milliLiter(s) Nebulizer every 6 hours  apixaban 5 milliGRAM(s) Oral every 12 hours  buDESOnide    Inhalation Suspension 0.5 milliGRAM(s) Inhalation daily  clopidogrel Tablet 75 milliGRAM(s) Oral daily  dextrose 5%. 1000 milliLiter(s) (100 mL/Hr) IV Continuous <Continuous>  dextrose 5%. 1000 milliLiter(s) (50 mL/Hr) IV Continuous <Continuous>  dextrose 50% Injectable 25 Gram(s) IV Push once  dextrose 50% Injectable 12.5 Gram(s) IV Push once  dextrose 50% Injectable 25 Gram(s) IV Push once  ertapenem  IVPB 1000 milliGRAM(s) IV Intermittent every 24 hours  ferrous    sulfate 325 milliGRAM(s) Oral daily  gabapentin 200 milliGRAM(s) Oral three times a day  glucagon  Injectable 1 milliGRAM(s) IntraMuscular once  glycopyrrolate 1 milliGRAM(s) Oral three times a day  insulin glargine Injectable (LANTUS) 25 Unit(s) SubCutaneous at bedtime  insulin lispro (ADMELOG) corrective regimen sliding scale   SubCutaneous three times a day before meals  labetalol 100 milliGRAM(s) Oral every 8 hours  levETIRAcetam 1000 milliGRAM(s) Oral two times a day  megestrol 20 milliGRAM(s) Oral daily  methylPREDNISolone 8 milliGRAM(s) Oral two times a day  mexiletine 200 milliGRAM(s) Oral three times a day  montelukast 10 milliGRAM(s) Oral daily  NIFEdipine XL 30 milliGRAM(s) Oral daily  pantoprazole    Tablet 40 milliGRAM(s) Oral before breakfast  rosuvastatin 5 milliGRAM(s) Oral at bedtime  sertraline 50 milliGRAM(s) Oral daily    MEDICATIONS  (PRN):  albuterol    90 MICROgram(s) HFA Inhaler 2 Puff(s) Inhalation every 6 hours PRN for shortness of breath and/or wheezing  dextrose Oral Gel 15 Gram(s) Oral once PRN Blood Glucose LESS THAN 70 milliGRAM(s)/deciliter      aspirin (Short breath)  cefepime (Anaphylaxis)  meropenem (Unknown)  Percocet (Unknown)  Avelox (Unknown)  shellfish (Anaphylaxis)  aspirin (Unknown)  OxyContin (Unknown)  penicillin (Unknown)  codeine (Unknown)  Valium (Unknown)  iodine (Short breath; Swelling)  tetanus toxoid (Short breath)  Valium (Short breath)  penicillin (Anaphylaxis)  Dilaudid (Short breath)  codeine (Short breath)  Avelox (Short breath; Pruritus)  ampicillin (Unknown)  cefepime (Short breath (Severe))      Social History:  Lives at home with daughter, requires assistance to ambulate with walker  No alcohol use, never smoker  Not working (20 Feb 2025 04:55)      REVIEW OF SYSTEMS:  CONSTITUTIONAL: No fever, No chills, No fatigue, No myalgia, No Body ache, No Weakness  EYES: No eye pain,  No visual disturbances, No discharge, No Redness  ENMT: No ear pain, No nose bleed, No vertigo; No sinus pain, No throat pain, No Congestion  NECK: No pain, No stiffness  RESPIRATORY: No cough, No wheezing, No hemoptysis, No shortness of breath  CARDIOVASCULAR: No chest pain, No palpitations  GASTROINTESTINAL: No epigastric pain. No nausea, No vomiting, No diarrhea, No constipation; [ x ] BM-  GENITOURINARY: +Urinary retention, +suprapubic tenderness; No dysuria, No frequency, No urgency, No hematuria, No incontinence  NEUROLOGICAL: No headaches, No dizziness, No numbness, No tingling, No tremors, No weakness  EXTREMITIES: No Swelling, No Pain, No Edema  SKIN: [ x ] No itching, burning, rashes, or lesions   MUSCULOSKELETAL: No joint pain, No joint swelling; No muscle pain, No back pain, No extremity pain  PSYCHIATRIC: No depression, No anxiety, No mood swings, No difficulty sleeping at night  PAIN SCALE: [  ] None  [  ] Other-  ROS Unable to obtain due to: [  ] Dementia  [  ] Lethargy  [  ] Sedated  [  ] Non verbal  REST OF REVIEW OF SYSTEMS: [ x ] Normal     Vital Signs Last 24 Hrs  T(C): 36.7 (20 Feb 2025 10:09), Max: 37.7 (20 Feb 2025 03:27)  T(F): 98 (20 Feb 2025 10:09), Max: 99.9 (20 Feb 2025 03:27)  HR: 60 (20 Feb 2025 10:09) (57 - 81)  BP: 124/74 (20 Feb 2025 10:09) (111/89 - 145/74)  BP(mean): 71 (20 Feb 2025 06:29) (70 - 98)  RR: 20 (20 Feb 2025 10:09) (20 - 23)  SpO2: 96% (20 Feb 2025 10:09) (92% - 100%)    Parameters below as of 20 Feb 2025 10:09  Patient On (Oxygen Delivery Method): nasal cannula  O2 Flow (L/min): 2      CAPILLARY BLOOD GLUCOSE      POCT Blood Glucose.: 167 mg/dL (20 Feb 2025 11:55)  POCT Blood Glucose.: 101 mg/dL (20 Feb 2025 09:28)  POCT Blood Glucose.: 77 mg/dL (20 Feb 2025 08:59)  POCT Blood Glucose.: 74 mg/dL (20 Feb 2025 08:47)  POCT Blood Glucose.: 63 mg/dL (20 Feb 2025 08:27)  POCT Blood Glucose.: 52 mg/dL (20 Feb 2025 08:14)  POCT Blood Glucose.: 54 mg/dL (20 Feb 2025 08:11)  POCT Blood Glucose.: 70 mg/dL (20 Feb 2025 08:03)      I&O's Summary    20 Feb 2025 07:01  -  20 Feb 2025 16:16  --------------------------------------------------------  IN: 0 mL / OUT: 950 mL / NET: -950 mL      PHYSICAL EXAM:  GENERAL:  [ x ] NAD, [ x ] Well appearing, [  ] Agitated, [  ] Drowsy, [  ] Lethargy, [  ] Confused   HEAD:  [ x ] Normal, [  ] Other  EYES:  [ x ] EOMI, [ x ] PERRLA, [ x ] Conjunctiva and sclera clear normal, [  ] Other, [  ] Pallor, [  ] Discharge  ENMT:  [ x ] Normal, [ x ] Moist mucous membranes, [  ] Good dentition, [  ] No thrush  NECK:  [ x ] Supple, [  ] No JVD, [ x ] Normal thyroid, [  ] Lymphadenopathy, [  ] Other  CHEST/LUNG:  [ x ] Clear to auscultation bilaterally, [ x ] Breath Sounds  decreased, [  ] Poor effort, [ x ] No rales, [ x ] coarse, rhonchorous breath sounds bilaterally, productive cough, [ x ] No wheezing  HEART:  [ x ] Regular rate and rhythm, [  ] Tachycardia, [  ] Bradycardia, [  ] Irregular, [ x ] No murmurs, No rubs, No gallops, [  ] PPM in place (Mfr:  )  ABDOMEN:  [ x ] Soft, [ x ] Suprapubic tenderness, [ x ] Nondistended, [ x ] No mass, [ x ] Bowel sounds present, [  ] Obese  NERVOUS SYSTEM:  [ x ] Alert & Oriented x3__, [ x ] Nonfocal, [  ] Confusion, [  ] Encephalopathic, [  ] Sedated, [  ] Unable to assess, [  ] Dementia, [  ] Other-  EXTREMITIES:  [ x ] 2+ Peripheral Pulses, No clubbing, No cyanosis,  [  ] Edema B/L lower EXT, [  ] PVD stasis skin changes B/L lower EXT, [  ] Wound  LYMPH:  No lymphadenopathy noted  SKIN:  [ x ] No rashes or lesions, [  ] Pressure ulcers, [  ] Ecchymosis, [  ] Skin tears, [  ] Other    DIET: Diet, DASH/TLC:   Sodium & Cholesterol Restricted  Consistent Carbohydrate Evening Snack (02-20-25 @ 13:23)      LABS:                        11.8   10.58 )-----------( 200      ( 20 Feb 2025 01:50 )             37.8     20 Feb 2025 01:50    139    |  106    |  15     ----------------------------<  116    3.8     |  28     |  0.77     Ca    8.8        20 Feb 2025 01:50    TPro  6.5    /  Alb  3.0    /  TBili  0.5    /  DBili  x      /  AST  16     /  ALT  17     /  AlkPhos  63     20 Feb 2025 01:50    PT/INR - ( 20 Feb 2025 01:50 )   PT: 17.2 sec;   INR: 1.46 ratio         PTT - ( 20 Feb 2025 01:50 )  PTT:34.7 sec  Urinalysis Basic - ( 20 Feb 2025 01:50 )    Color: x / Appearance: x / SG: x / pH: x  Gluc: 116 mg/dL / Ketone: x  / Bili: x / Urobili: x   Blood: x / Protein: x / Nitrite: x   Leuk Esterase: x / RBC: x / WBC x   Sq Epi: x / Non Sq Epi: x / Bacteria: x                 Anemia Panel:      Thyroid Panel:                RADIOLOGY & ADDITIONAL TESTS: _______      HEALTH ISSUES - PROBLEM Dx:  Fever    Chronic obstructive pulmonary disease (COPD)    Chronic atrial fibrillation    Asthma    HTN (hypertension)    History of TIAs    Epilepsy    Type 2 diabetes mellitus    Neuropathy    Need for prophylactic measure          Consultant(s) Notes Reviewed:  [ x ] YES     Care Discussed with [ x ] Consultants, [ x ] Patient, [ x ] Family, [  ] HCP, [ x ] , [  ] Social Service, [ x ] RN, [  ] Physical Therapy, [  ] Palliative Care Team  DVT PPX: [  ] Lovenox, [  ] SC Heparin, [  ] Coumadin, [  ] Xarelto, [  ] Eliquis, [  ] Pradaxa, [  ] IV Heparin drip, [  ] SCD, [  ] Ambulation, [  ] Contraindicated 2/2 GI Bleed, [  ] Contraindicated 2/2  Bleed, [  ] Contraindicated 2/2 Brain Bleed  Advanced Directive: [  ] None, [  ] DNR/DNI

## 2025-02-20 NOTE — PROGRESS NOTE ADULT - PROBLEM SELECTOR PLAN 1
Pt with reported fever and hypoxia at home associated with general weakness.  - Pt afebrile in ED, satting well on room air  - CT ANGIO PE, ABD, PELV IV   No parenchymal infiltration or consolidation.  No intra-abdominal abscess or evidence of enterocolitis.  Known type A aortic dissection.  Stable chronic findings as described.  - WBC 10.58, Lactate 1.3, Procalcitonin .08   - ProBNP 1896  - Ertapenem started in ED will continue, tolerated in past.  - Pt has hx of PNA in past requiring abx  - F/u blood cx, urine cx, urine strep ag, legionella  - ID Dr Peng consulted recs appreciated Pt with reported fever and hypoxia at home associated with general weakness.  - Pt afebrile in ED, satting well on room air  - CT ANGIO PE, ABD, PELV IV   No parenchymal infiltration or consolidation.  No intra-abdominal abscess or evidence of enterocolitis.  Known type A aortic dissection.  Stable chronic findings as described.  - WBC 10.58, Lactate 1.3, Procalcitonin .08   - ProBNP 1896  - Ertapenem started in ED will STOP as per ID  - Pt has hx of PNA in past requiring abx  - F/u blood cx, urine cx, urine strep ag, legionella  - ID Dr Peng group Dr Soto saw pt

## 2025-02-20 NOTE — ED PROVIDER NOTE - INTERNATIONAL TRAVEL
Telephone Encounter by Alida Leiva NCMA at 05/19/17 02:15 PM     Author:  Alida Leiva NCMA Service:  (none) Author Type:  Certified Medical Assistant     Filed:  05/19/17 02:15 PM Encounter Date:  5/19/2017 Status:  Signed     :  Alida Leiva NCMA (Certified Medical Assistant)            Left message on answering machine to call back.  Office phone number and hours provided in message.[[email or web address]]        Revision History        User Key Date/Time User Provider Type Action    > LM1.1 05/19/17 02:15 PM Alida Leiva NCMA Certified Medical Assistant Sign    T - Template             No

## 2025-02-20 NOTE — ED ADULT NURSE REASSESSMENT NOTE - NS ED NURSE REASSESS COMMENT FT1
pt bladder scanned noted with 925ml   pt straight cath as ordered noted with 950ml output. Dr Faith made aware    update provided to floor nurse Natasha, RN regarding Blood glucose and straight cath   vs remain wnl   no ss of distress denies cp sob  able to make needs known  colostomy bag empty, no output on my shift

## 2025-02-20 NOTE — PATIENT PROFILE ADULT - FALL HARM RISK - HARM RISK INTERVENTIONS
Assistance OOB with selected safe patient handling equipment/Communicate Risk of Fall with Harm to all staff/Discuss with provider need for PT consult/Monitor gait and stability/Provide patient with walking aids - walker, cane, crutches/Reinforce activity limits and safety measures with patient and family/Tailored Fall Risk Interventions/Visual Cue: Yellow wristband and red socks/Bed in lowest position, wheels locked, appropriate side rails in place/Call bell, personal items and telephone in reach/Instruct patient to call for assistance before getting out of bed or chair/Non-slip footwear when patient is out of bed/[place] to call system/Physically safe environment - no spills, clutter or unnecessary equipment/Purposeful Proactive Rounding/Room/bathroom lighting operational, light cord in reach

## 2025-02-20 NOTE — H&P ADULT - ATTENDING COMMENTS
74yFemale pmhx of Epilepsy on Keppra, COPD,, DM2  asthma on chronic steroids, bronchiectasis, tracheomalacia s/p tracheloplasty HTN, Hx of dissection of descending thoracic aorta, , hx of aortic aneurysm,  Type B 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  Evaluated by Dr. Antonio,  Type A dissection s/p bioAVR with Bental procedure (9/2022), severe AS s/p TAVR (9/10/2023), TIA's, DVT Afib on Eliquis, T2DM, Colon cancer S/P Colostomy , neuropathy, presents to the ED for fever. Admitting for Hypoxia at home, Possible COPD Exacerbation  Pt seen, examined, case &  care plan d/w pt, residents at detail.  Consults-  Pulmonary-Dr Luis  -ID Dr Peng  PT Eval  PO diet  AM labs   DVT ppx on Ac

## 2025-02-20 NOTE — PHYSICAL THERAPY INITIAL EVALUATION ADULT - BALANCE DISTURBANCE, IDENTIFIED IMPAIRMENT CONTRIBUTE, REHAB EVAL
March 14, 2024     Patient: Ward Dior  YOB: 1967  Date of Visit: 3/14/2024      To Whom it May Concern:    Ward Dior is under my professional care. Ward was seen in my office on 3/14/2024. Ward can return to full duty without any specific limitations at this time.    If you have any questions or concerns, please don't hesitate to call.         Sincerely,          Jerrod Eid MD        CC: No Recipients   impaired postural control/decreased strength

## 2025-02-20 NOTE — H&P ADULT - NSICDXPASTSURGICALHX_GEN_ALL_CORE_FT
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    H/O pelvic surgery 5 years ago - s/p fracture    H/O total knee replacement, bilateral 5 years ago    History of partial hysterectomy 30 years ago - fibroids    History of sinus surgery multiple sinus surgeries    History of tracheomalacia 2015 - attempted tracheal stenting ([facility])- course complicated by obstruction, respiratory failure, multiple CPR attempts -  stent discontinued; 10/20/2016 Tracheobronchoplasty (Prolene Mesh) performed at [facility] by Dr Zapien    Rectal bleeding exam under anesthesia ([facility]) 2/2018    S/P aortic valve replacement     S/P AVR (aortic valve replacement)     S/P bronchoscopy 6/5/2018 - [place] (Dr Zapien) no evidence of tracheobronchomalacia in trachea or bronchial tubes    S/P colostomy     S/P TAVR (transcatheter aortic valve replacement)

## 2025-02-21 ENCOUNTER — TRANSCRIPTION ENCOUNTER (OUTPATIENT)
Age: 75
End: 2025-02-21

## 2025-02-21 VITALS
SYSTOLIC BLOOD PRESSURE: 139 MMHG | HEART RATE: 95 BPM | RESPIRATION RATE: 18 BRPM | OXYGEN SATURATION: 92 % | DIASTOLIC BLOOD PRESSURE: 69 MMHG

## 2025-02-21 DIAGNOSIS — R50.9 FEVER, UNSPECIFIED: ICD-10-CM

## 2025-02-21 LAB
A1C WITH ESTIMATED AVERAGE GLUCOSE RESULT: 7.7 % — HIGH (ref 4–5.6)
ALBUMIN SERPL ELPH-MCNC: 2.8 G/DL — LOW (ref 3.3–5)
ALP SERPL-CCNC: 62 U/L — SIGNIFICANT CHANGE UP (ref 40–120)
ALT FLD-CCNC: 14 U/L — SIGNIFICANT CHANGE UP (ref 12–78)
ANION GAP SERPL CALC-SCNC: 6 MMOL/L — SIGNIFICANT CHANGE UP (ref 5–17)
AST SERPL-CCNC: 14 U/L — LOW (ref 15–37)
BASOPHILS # BLD AUTO: 0.01 K/UL — SIGNIFICANT CHANGE UP (ref 0–0.2)
BASOPHILS NFR BLD AUTO: 0.1 % — SIGNIFICANT CHANGE UP (ref 0–2)
BILIRUB SERPL-MCNC: 0.3 MG/DL — SIGNIFICANT CHANGE UP (ref 0.2–1.2)
BUN SERPL-MCNC: 13 MG/DL — SIGNIFICANT CHANGE UP (ref 7–23)
CALCIUM SERPL-MCNC: 9 MG/DL — SIGNIFICANT CHANGE UP (ref 8.5–10.1)
CHLORIDE SERPL-SCNC: 110 MMOL/L — HIGH (ref 96–108)
CO2 SERPL-SCNC: 26 MMOL/L — SIGNIFICANT CHANGE UP (ref 22–31)
CREAT SERPL-MCNC: 0.65 MG/DL — SIGNIFICANT CHANGE UP (ref 0.5–1.3)
EGFR: 91 ML/MIN/1.73M2 — SIGNIFICANT CHANGE UP
EOSINOPHIL # BLD AUTO: 0.07 K/UL — SIGNIFICANT CHANGE UP (ref 0–0.5)
EOSINOPHIL NFR BLD AUTO: 1 % — SIGNIFICANT CHANGE UP (ref 0–6)
ESTIMATED AVERAGE GLUCOSE: 174 MG/DL — HIGH (ref 68–114)
GLUCOSE BLDC GLUCOMTR-MCNC: 146 MG/DL — HIGH (ref 70–99)
GLUCOSE BLDC GLUCOMTR-MCNC: 251 MG/DL — HIGH (ref 70–99)
GLUCOSE BLDC GLUCOMTR-MCNC: 253 MG/DL — HIGH (ref 70–99)
GLUCOSE SERPL-MCNC: 125 MG/DL — HIGH (ref 70–99)
HCT VFR BLD CALC: 35.8 % — SIGNIFICANT CHANGE UP (ref 34.5–45)
HGB BLD-MCNC: 11.3 G/DL — LOW (ref 11.5–15.5)
IMM GRANULOCYTES NFR BLD AUTO: 0.3 % — SIGNIFICANT CHANGE UP (ref 0–0.9)
LYMPHOCYTES # BLD AUTO: 1.1 K/UL — SIGNIFICANT CHANGE UP (ref 1–3.3)
LYMPHOCYTES # BLD AUTO: 15 % — SIGNIFICANT CHANGE UP (ref 13–44)
MCHC RBC-ENTMCNC: 24.6 PG — LOW (ref 27–34)
MCHC RBC-ENTMCNC: 31.6 G/DL — LOW (ref 32–36)
MCV RBC AUTO: 77.8 FL — LOW (ref 80–100)
MONOCYTES # BLD AUTO: 1.08 K/UL — HIGH (ref 0–0.9)
MONOCYTES NFR BLD AUTO: 14.7 % — HIGH (ref 2–14)
NEUTROPHILS # BLD AUTO: 5.06 K/UL — SIGNIFICANT CHANGE UP (ref 1.8–7.4)
NEUTROPHILS NFR BLD AUTO: 68.9 % — SIGNIFICANT CHANGE UP (ref 43–77)
NRBC BLD AUTO-RTO: 0 /100 WBCS — SIGNIFICANT CHANGE UP (ref 0–0)
PLATELET # BLD AUTO: 231 K/UL — SIGNIFICANT CHANGE UP (ref 150–400)
POTASSIUM SERPL-MCNC: 4.4 MMOL/L — SIGNIFICANT CHANGE UP (ref 3.5–5.3)
POTASSIUM SERPL-SCNC: 4.4 MMOL/L — SIGNIFICANT CHANGE UP (ref 3.5–5.3)
PROT SERPL-MCNC: 6.4 G/DL — SIGNIFICANT CHANGE UP (ref 6–8.3)
RBC # BLD: 4.6 M/UL — SIGNIFICANT CHANGE UP (ref 3.8–5.2)
RBC # FLD: 17.7 % — HIGH (ref 10.3–14.5)
SODIUM SERPL-SCNC: 142 MMOL/L — SIGNIFICANT CHANGE UP (ref 135–145)
WBC # BLD: 7.34 K/UL — SIGNIFICANT CHANGE UP (ref 3.8–10.5)
WBC # FLD AUTO: 7.34 K/UL — SIGNIFICANT CHANGE UP (ref 3.8–10.5)

## 2025-02-21 PROCEDURE — 99232 SBSQ HOSP IP/OBS MODERATE 35: CPT

## 2025-02-21 PROCEDURE — 74177 CT ABD & PELVIS W/CONTRAST: CPT | Mod: MC

## 2025-02-21 PROCEDURE — 87637 SARSCOV2&INF A&B&RSV AMP PRB: CPT

## 2025-02-21 PROCEDURE — 99285 EMERGENCY DEPT VISIT HI MDM: CPT

## 2025-02-21 PROCEDURE — 71275 CT ANGIOGRAPHY CHEST: CPT | Mod: MC

## 2025-02-21 PROCEDURE — 96375 TX/PRO/DX INJ NEW DRUG ADDON: CPT

## 2025-02-21 PROCEDURE — 84145 PROCALCITONIN (PCT): CPT

## 2025-02-21 PROCEDURE — 36415 COLL VENOUS BLD VENIPUNCTURE: CPT

## 2025-02-21 PROCEDURE — 96374 THER/PROPH/DIAG INJ IV PUSH: CPT

## 2025-02-21 PROCEDURE — 93005 ELECTROCARDIOGRAM TRACING: CPT

## 2025-02-21 PROCEDURE — 87040 BLOOD CULTURE FOR BACTERIA: CPT

## 2025-02-21 PROCEDURE — 99233 SBSQ HOSP IP/OBS HIGH 50: CPT

## 2025-02-21 PROCEDURE — 71045 X-RAY EXAM CHEST 1 VIEW: CPT

## 2025-02-21 PROCEDURE — 74176 CT ABD & PELVIS W/O CONTRAST: CPT | Mod: MC

## 2025-02-21 PROCEDURE — 85610 PROTHROMBIN TIME: CPT

## 2025-02-21 PROCEDURE — 80053 COMPREHEN METABOLIC PANEL: CPT

## 2025-02-21 PROCEDURE — 83605 ASSAY OF LACTIC ACID: CPT

## 2025-02-21 PROCEDURE — 82962 GLUCOSE BLOOD TEST: CPT

## 2025-02-21 PROCEDURE — G0545: CPT

## 2025-02-21 PROCEDURE — 94640 AIRWAY INHALATION TREATMENT: CPT

## 2025-02-21 PROCEDURE — 85730 THROMBOPLASTIN TIME PARTIAL: CPT

## 2025-02-21 PROCEDURE — 84484 ASSAY OF TROPONIN QUANT: CPT

## 2025-02-21 PROCEDURE — 94760 N-INVAS EAR/PLS OXIMETRY 1: CPT

## 2025-02-21 PROCEDURE — 83036 HEMOGLOBIN GLYCOSYLATED A1C: CPT

## 2025-02-21 PROCEDURE — 85025 COMPLETE CBC W/AUTO DIFF WBC: CPT

## 2025-02-21 PROCEDURE — 83880 ASSAY OF NATRIURETIC PEPTIDE: CPT

## 2025-02-21 PROCEDURE — 81001 URINALYSIS AUTO W/SCOPE: CPT

## 2025-02-21 RX ORDER — LACOSAMIDE 150 MG/1
1 TABLET, FILM COATED ORAL
Refills: 0 | DISCHARGE

## 2025-02-21 RX ORDER — INSULIN GLARGINE-YFGN 100 [IU]/ML
30 INJECTION, SOLUTION SUBCUTANEOUS
Qty: 0 | Refills: 0 | DISCHARGE

## 2025-02-21 RX ORDER — ALBUTEROL SULFATE 2.5 MG/3ML
2 VIAL, NEBULIZER (ML) INHALATION
Refills: 0 | DISCHARGE

## 2025-02-21 RX ORDER — SENNA 187 MG
2 TABLET ORAL
Refills: 0 | DISCHARGE

## 2025-02-21 RX ORDER — UMECLIDINIUM 62.5 UG/1
1 AEROSOL, POWDER ORAL
Refills: 0 | DISCHARGE

## 2025-02-21 RX ORDER — INSULIN GLARGINE-YFGN 100 [IU]/ML
18 INJECTION, SOLUTION SUBCUTANEOUS
Refills: 0 | DISCHARGE

## 2025-02-21 RX ORDER — INSULIN ASPART 100 [IU]/ML
0 INJECTION, SOLUTION INTRAVENOUS; SUBCUTANEOUS
Refills: 0 | DISCHARGE

## 2025-02-21 RX ORDER — APIXABAN 2.5 MG/1
1 TABLET, FILM COATED ORAL
Refills: 0 | DISCHARGE

## 2025-02-21 RX ORDER — MINERAL OIL
30 OIL (ML) MISCELLANEOUS
Qty: 0 | Refills: 0 | DISCHARGE
Start: 2025-02-21

## 2025-02-21 RX ORDER — LACOSAMIDE 150 MG/1
100 TABLET, FILM COATED ORAL
Refills: 0 | Status: DISCONTINUED | OUTPATIENT
Start: 2025-02-21 | End: 2025-02-21

## 2025-02-21 RX ORDER — POLYETHYLENE GLYCOL 3350 17 G/17G
17 POWDER, FOR SOLUTION ORAL
Qty: 0 | Refills: 0 | DISCHARGE
Start: 2025-02-21

## 2025-02-21 RX ORDER — ENSIFENTRINE 3 MG/2.5ML
3 SUSPENSION RESPIRATORY (INHALATION)
Refills: 0 | DISCHARGE

## 2025-02-21 RX ORDER — TEZEPELUMAB-EKKO 210 MG/1.9ML
210 INJECTION, SOLUTION SUBCUTANEOUS
Qty: 1 | Refills: 5 | Status: ACTIVE | COMMUNITY
Start: 2025-02-14 | End: 1900-01-01

## 2025-02-21 RX ORDER — INSULIN GLARGINE-YFGN 100 [IU]/ML
23 INJECTION, SOLUTION SUBCUTANEOUS
Refills: 0 | DISCHARGE

## 2025-02-21 RX ORDER — INSULIN GLARGINE-YFGN 100 [IU]/ML
28 INJECTION, SOLUTION SUBCUTANEOUS
Refills: 0 | DISCHARGE

## 2025-02-21 RX ORDER — DOXYCYCLINE HYCLATE 100 MG
100 TABLET ORAL EVERY 12 HOURS
Refills: 0 | Status: DISCONTINUED | OUTPATIENT
Start: 2025-02-21 | End: 2025-02-21

## 2025-02-21 RX ADMIN — Medication 40 MILLIGRAM(S): at 05:08

## 2025-02-21 RX ADMIN — LABETALOL HYDROCHLORIDE 100 MILLIGRAM(S): 200 TABLET, FILM COATED ORAL at 14:49

## 2025-02-21 RX ADMIN — GLYCOPYRROLATE 1 MILLIGRAM(S): 0.2 INJECTION INTRAMUSCULAR; INTRAVENOUS at 05:09

## 2025-02-21 RX ADMIN — GABAPENTIN 200 MILLIGRAM(S): 400 CAPSULE ORAL at 05:08

## 2025-02-21 RX ADMIN — INSULIN LISPRO 3: 100 INJECTION, SOLUTION INTRAVENOUS; SUBCUTANEOUS at 12:10

## 2025-02-21 RX ADMIN — GABAPENTIN 200 MILLIGRAM(S): 400 CAPSULE ORAL at 14:49

## 2025-02-21 RX ADMIN — MEXILETINE HYDROCHLORIDE 200 MILLIGRAM(S): 250 CAPSULE ORAL at 05:09

## 2025-02-21 RX ADMIN — METHYLPREDNISOLONE ACETATE 8 MILLIGRAM(S): 80 INJECTION, SUSPENSION INTRA-ARTICULAR; INTRALESIONAL; INTRAMUSCULAR; SOFT TISSUE at 05:08

## 2025-02-21 RX ADMIN — IPRATROPIUM BROMIDE AND ALBUTEROL SULFATE 3 MILLILITER(S): .5; 2.5 SOLUTION RESPIRATORY (INHALATION) at 13:39

## 2025-02-21 RX ADMIN — LEVETIRACETAM 1000 MILLIGRAM(S): 10 INJECTION, SOLUTION INTRAVENOUS at 17:22

## 2025-02-21 RX ADMIN — LEVETIRACETAM 1000 MILLIGRAM(S): 10 INJECTION, SOLUTION INTRAVENOUS at 05:08

## 2025-02-21 RX ADMIN — Medication 325 MILLIGRAM(S): at 11:43

## 2025-02-21 RX ADMIN — APIXABAN 5 MILLIGRAM(S): 2.5 TABLET, FILM COATED ORAL at 05:09

## 2025-02-21 RX ADMIN — IPRATROPIUM BROMIDE AND ALBUTEROL SULFATE 3 MILLILITER(S): .5; 2.5 SOLUTION RESPIRATORY (INHALATION) at 08:10

## 2025-02-21 RX ADMIN — SERTRALINE 50 MILLIGRAM(S): 100 TABLET, FILM COATED ORAL at 11:48

## 2025-02-21 RX ADMIN — POLYETHYLENE GLYCOL 3350 17 GRAM(S): 17 POWDER, FOR SOLUTION ORAL at 11:45

## 2025-02-21 RX ADMIN — BUDESONIDE 0.5 MILLIGRAM(S): 0.25 SUSPENSION RESPIRATORY (INHALATION) at 08:10

## 2025-02-21 RX ADMIN — LACOSAMIDE 100 MILLIGRAM(S): 150 TABLET, FILM COATED ORAL at 18:26

## 2025-02-21 RX ADMIN — Medication 30 MILLIGRAM(S): at 05:09

## 2025-02-21 RX ADMIN — INSULIN LISPRO 3: 100 INJECTION, SOLUTION INTRAVENOUS; SUBCUTANEOUS at 17:21

## 2025-02-21 RX ADMIN — LABETALOL HYDROCHLORIDE 100 MILLIGRAM(S): 200 TABLET, FILM COATED ORAL at 05:09

## 2025-02-21 RX ADMIN — CLOPIDOGREL BISULFATE 75 MILLIGRAM(S): 75 TABLET, FILM COATED ORAL at 11:44

## 2025-02-21 RX ADMIN — METHYLPREDNISOLONE ACETATE 8 MILLIGRAM(S): 80 INJECTION, SUSPENSION INTRA-ARTICULAR; INTRALESIONAL; INTRAMUSCULAR; SOFT TISSUE at 17:23

## 2025-02-21 RX ADMIN — APIXABAN 5 MILLIGRAM(S): 2.5 TABLET, FILM COATED ORAL at 17:23

## 2025-02-21 RX ADMIN — Medication 100 MILLIGRAM(S): at 17:22

## 2025-02-21 RX ADMIN — MEXILETINE HYDROCHLORIDE 200 MILLIGRAM(S): 250 CAPSULE ORAL at 14:53

## 2025-02-21 RX ADMIN — Medication 20 MILLIGRAM(S): at 11:45

## 2025-02-21 RX ADMIN — Medication 30 MILLILITER(S): at 11:44

## 2025-02-21 RX ADMIN — MONTELUKAST SODIUM 10 MILLIGRAM(S): 10 TABLET ORAL at 11:44

## 2025-02-21 RX ADMIN — GLYCOPYRROLATE 1 MILLIGRAM(S): 0.2 INJECTION INTRAMUSCULAR; INTRAVENOUS at 14:49

## 2025-02-21 NOTE — PATIENT CHOICE NOTE. - NSPTCHOICENOTES_GEN_ALL_CORE
D/C resource folder provided at bedside which includes lists for both rehab facilities and home care agencies and transportation letter advising on ambulance and ambulette transportation. CM reviewed folder during assessment. Pt daughter stated pt has used [facility] [phone] and would like to use them again

## 2025-02-21 NOTE — PROGRESS NOTE ADULT - SUBJECTIVE AND OBJECTIVE BOX
[facility]  INFECTIOUS DISEASES   [address] [place] [address]  Tel: [phone]     Fax: [phone]  ==============================================================================    RAYRAY RODRIGUEZ  [id number]  Female  74y ([birth year])        Patient is a 74y old  Female who presents with a chief complaint of Fever (20 Feb 2025 04:55)    Follow up:      Allergies  aspirin (Short breath)  cefepime (Anaphylaxis)  meropenem (Unknown)  Percocet (Unknown)  Avelox (Unknown)  shellfish (Anaphylaxis)  OxyContin (Unknown)  codeine (Unknown)  Valium (Unknown)  iodine (Short breath; Swelling)  tetanus toxoid (Short breath)  penicillin (Anaphylaxis)  Dilaudid (Short breath)  codeine (Short breath)  Avelox (Short breath; Pruritus)  ampicillin (Unknown)      ANTIMICROBIALS:  MEDICATIONS  (STANDING):  ertapenem  IVPB   100 mL/Hr IV Intermittent (02-20-25 @ 02:12)    MEDICATIONS  (STANDING):  albuterol/ipratropium for Nebulization 3 milliLiter(s) Nebulizer every 6 hours  apixaban 5 milliGRAM(s) Oral every 12 hours  buDESOnide    Inhalation Suspension 0.5 milliGRAM(s) Inhalation daily  clopidogrel Tablet 75 milliGRAM(s) Oral daily  dextrose 5%. 1000 milliLiter(s) (100 mL/Hr) IV Continuous <Continuous>  dextrose 5%. 1000 milliLiter(s) (50 mL/Hr) IV Continuous <Continuous>  dextrose 50% Injectable 25 Gram(s) IV Push once  dextrose 50% Injectable 12.5 Gram(s) IV Push once  dextrose 50% Injectable 25 Gram(s) IV Push once  ferrous    sulfate 325 milliGRAM(s) Oral daily  gabapentin 200 milliGRAM(s) Oral three times a day  glucagon  Injectable 1 milliGRAM(s) IntraMuscular once  glycopyrrolate 1 milliGRAM(s) Oral three times a day  insulin glargine Injectable (LANTUS) 25 Unit(s) SubCutaneous at bedtime  insulin lispro (ADMELOG) corrective regimen sliding scale   SubCutaneous three times a day before meals  labetalol 100 milliGRAM(s) Oral every 8 hours  levETIRAcetam 1000 milliGRAM(s) Oral two times a day  megestrol 20 milliGRAM(s) Oral daily  methylPREDNISolone 8 milliGRAM(s) Oral two times a day  mexiletine 200 milliGRAM(s) Oral three times a day  mineral oil 30 milliLiter(s) Oral daily  montelukast 10 milliGRAM(s) Oral daily  NIFEdipine XL 30 milliGRAM(s) Oral daily  pantoprazole    Tablet 40 milliGRAM(s) Oral before breakfast  polyethylene glycol 3350 17 Gram(s) Oral daily  rosuvastatin 5 milliGRAM(s) Oral at bedtime  senna 2 Tablet(s) Oral at bedtime  sertraline 50 milliGRAM(s) Oral daily      REVIEW OF SYSTEMS  [ x ] ROS unobtainable because:  Lethargic  [  ] All other systems negative except as noted below:	    Constitutional:  [ ] fever [ ] chills  [ ] weight loss  [ ] weakness  Skin:  [ ] rash [ ] phlebitis	  Eyes: [ ] icterus [ ] pain  [ ] discharge	  ENMT: [ ] sore throat  [ ] thrush [ ] ulcers [ ] exudates  Respiratory: [ ] dyspnea [ ] hemoptysis [ ] cough [ ] sputum	  Cardiovascular:  [ ] chest pain [ ] palpitations [ ] edema	  Gastrointestinal:  [ ] nausea [ ] vomiting [ ] diarrhea [ ] constipation [ ] pain	  Genitourinary:  [ ] dysuria [ ] frequency [ ] hematuria [ ] discharge [ ] flank pain  [ ] incontinence  Musculoskeletal:  [ ] myalgias [ ] arthralgias [ ] arthritis  [ ] back pain  Neurological:  [ ] headache [ ] seizures  [ ] confusion/altered mental status  Psychiatric:  [ ] anxiety [ ] depression	  Hematology/Lymphatics:  [ ] lymphadenopathy  Endocrine:  [ ] adrenal [ ] thyroid  Allergic/Immunologic:	 [ ] transplant [ ] seasonal      PHYSICAL EXAM:  Vital Signs Last 24 Hrs  T(C): 36.8 (21 Feb 2025 11:25), Max: 36.9 (21 Feb 2025 04:48)  T(F): 98.3 (21 Feb 2025 11:25), Max: 98.4 (21 Feb 2025 04:48)  HR: 80 (21 Feb 2025 11:25) (58 - 83)  BP: 126/70 (21 Feb 2025 11:25) (126/70 - 135/58)  BP(mean): --  RR: 18 (21 Feb 2025 11:25) (18 - 18)  SpO2: 90% (21 Feb 2025 11:25) (90% - 96%)    Parameters below as of 21 Feb 2025 11:25  Patient On (Oxygen Delivery Method): room air      Constitutional: Elderly woman, on room air  HEENT: Bilateral proptosis  Cardiovascular:   normal S1, S2, no edema  Respiratory:  Course rhonchi bilateral lung fields  GI:  soft, non-tender, normal bowel sounds, + colostomy bag  :  no ramirez, no CVA tenderness  Musculoskeletal:  no visible deformity  Neurologic: Lethargic, goes to sleep while talking  Skin:  warm dry skin  Heme/Onc: no lymphadenopathy     WBC Count: 7.34 K/uL (02-21 @ 08:41)  WBC Count: 10.58 K/uL (02-20 @ 01:50)                          11.3   7.34  )-----------( 231      ( 21 Feb 2025 08:41 )             35.8     02-21    142  |  110[H]  |  13  ----------------------------<  125[H]  4.4   |  26  |  0.65    Ca    9.0      21 Feb 2025 08:41    TPro  6.4  /  Alb  2.8[L]  /  TBili  0.3  /  DBili  x   /  AST  14[L]  /  ALT  14  /  AlkPhos  62  02-21      Creatinine Trend  Creatinine: 0.65 (02-21)  Creatinine: 0.77 (02-20)    Lactate, Blood: 1.3 mmol/L (02-20-25 @ 01:50)      MICROBIOLOGY:  .Blood Blood-Peripheral  02-20-25   No growth at 24 hours  --  --      .Blood Blood-Peripheral  02-20-25   No growth at 24 hours  --  --    SARS-CoV-2: NotDetec (08 Jan 2025 11:50)    MRSA/MSSA PCR (12.13.24 @ 07:08)   MRSA PCR Result.: NotDetec:    Staph aureus PCR Result: NotDetec    Legionella pneumophila Antigen, Urine (12.13.24 @ 10:51)    Legionella Antigen, Urine: Negative      RADIOLOGY:  ACC: [id number] EXAM:  XR CHEST PORTABLE URGENT 1V   ORDERED BY: SUKHJINDER AGUILERA     PROCEDURE DATE:  02/20/2025      INTERPRETATION:  EXAM: XR CHEST URGENT    INDICATION: Sepsis PXR    COMPARISON: February 2, 2025 chest x-ray and CT performed today    IMPRESSION: No focal infiltrate or congestion. Borderline cardiomegaly on   portable exam. Sternotomy noted. Aortic valve stent graft noted. Osseous   structures unchanged    --- End of Report ---    ACC: [id number] EXAM:  CT ABDOMEN AND PELVIS IC   ORDERED BY: SUKHJINDER AGUILERA     ACC: [id number] EXAM:  CT ANGIO CHEST PULM ART [facility]   ORDERED BY: SUKHJINDER AGUILERA     PROCEDURE DATE:  02/20/2025      INTERPRETATION:  CLINICAL INFORMATION: Fever and hypoxia. History of   bronchiectasis. Decreased appetite. History of colon cancer.    COMPARISON: 1/8/2025, 12/12/2024    CONTRAST/COMPLICATIONS:  IV Contrast: Omnipaque 350  90 cc administered   10 cc discarded  Oral Contrast: NONE    PROCEDURE:  CT Angiography of the Chest was performed followed by portal venous phase   imaging of the Abdomen and Pelvis.  Sagittal and coronal reformats were performed as well as 3D (MIP)   reconstructions.    FINDINGS:  CHEST:  LUNGS AND LARGE AIRWAYS: Patent central airways. No pulmonary nodules. No   consolidation or parenchymal infiltration. Motion artifact limits   detailed evaluation of the lung parenchyma.  PLEURA: No pleural effusion.  VESSELS: Known type A aortic dissection starting at the innominate artery   extending to the infrarenal aorta. Extensive atherosclerotic   calcification. Aortic valve replacement.  HEART: Heart size is normal. No pericardial effusion.  MEDIASTINUM AND MARANDA: No lymphadenopathy.  CHEST WALL AND LOWER NECK: Within normal limits.    ABDOMEN AND PELVIS:  LIVER: Within normal limits.  BILE DUCTS: Normal caliber.  GALLBLADDER: Within normal limits.  SPLEEN: Within normal limits.  PANCREAS: No change of hypodense lesions in or around the pancreatic tail.  ADRENALS: Within normal limits.  KIDNEYS/URETERS: Bilateral hypodense lesions with variable attenuation   more prominent on the left. No perinephric stranding or hydronephrosis.    BLADDER: Overdistended. No wall thickening.  REPRODUCTIVE ORGANS: Hysterectomy.    BOWEL: No bowel obstruction. Appendix is not visualized. Status post   rectosigmoid resection with left anterior colostomy. Large stool burden.  PERITONEUM/RETROPERITONEUM: Within normal limits.  VESSELS: Atherosclerotic changes. Dissection flap extending to the   infrarenal aorta.  LYMPH NODES: No lymphadenopathy.  ABDOMINAL WALL: Left lower colostomy.  BONES: Surgical hardware in the right sacroiliac joint and symphysis   pubis. Avascular necrosis of both femoral heads.    IMPRESSION:  No parenchymal infiltration or consolidation.  No intra-abdominal abscess or evidence of enterocolitis.  Known type A aortic dissection.  Stable chronic findings as described.      --- End of Report --      ACC: [id number] EXAM:  CT ABDOMEN AND PELVIS IC   ORDERED BY: SUKHJINDER AGUILERA     ACC: [id number] EXAM:  CT ANGIO CHEST PULM ART [facility]   ORDERED BY: SUKHJINDER AGUILERA     PROCEDURE DATE:  02/20/2025        INTERPRETATION:  CLINICAL INFORMATION: Fever and hypoxia. History of   bronchiectasis. Decreased appetite. History of colon cancer.    COMPARISON: 1/8/2025, 12/12/2024    CONTRAST/COMPLICATIONS:  IV Contrast: Omnipaque 350  90 cc administered   10 cc discarded  Oral Contrast: NONE      PROCEDURE:  CT Angiography of the Chest was performed followed by portal venous phase   imaging of the Abdomen and Pelvis.  Sagittal and coronal reformats were performed as well as 3D (MIP)   reconstructions.    FINDINGS:  CHEST:  LUNGS AND LARGE AIRWAYS: Patent central airways. No pulmonary nodules. No   consolidation or parenchymal infiltration. Motion artifact limits   detailed evaluation of the lung parenchyma.  PLEURA: No pleural effusion.  VESSELS: Known type A aortic dissection starting at the innominate artery   extending to the infrarenal aorta. Extensive atherosclerotic   calcification. Aortic valve replacement.  HEART: Heart size is normal. No pericardial effusion.  MEDIASTINUM AND MARANDA: No lymphadenopathy.  CHEST WALL AND LOWER NECK: Within normal limits.    ABDOMEN AND PELVIS:  LIVER: Within normal limits.  BILE DUCTS: Normal caliber.  GALLBLADDER: Within normal limits.  SPLEEN: Within normal limits.  PANCREAS: No change of hypodense lesions in or around the pancreatic tail.  ADRENALS: Within normal limits.  KIDNEYS/URETERS: Bilateral hypodense lesions with variable attenuation   more prominent on the left. No perinephric stranding or hydronephrosis.    BLADDER: Overdistended. No wall thickening.  REPRODUCTIVE ORGANS: Hysterectomy.    BOWEL: No bowel obstruction. Appendix is not visualized. Status post   rectosigmoid resection with left anterior colostomy. Large stool burden.  PERITONEUM/RETROPERITONEUM: Within normal limits.  VESSELS: Atherosclerotic changes. Dissection flap extending to the   infrarenal aorta.  LYMPH NODES: No lymphadenopathy.  ABDOMINAL WALL: Left lower colostomy.  BONES: Surgical hardware in the right sacroiliac joint and symphysis   pubis. Avascular necrosis of both femoral heads.    IMPRESSION:  No parenchymal infiltration or consolidation.  No intra-abdominal abscess or evidence of enterocolitis.  Known type A aortic dissection.  Stable chronic findings as described.      --- End of Report ---    I have personally reviewed the above imaging  [facility]  INFECTIOUS DISEASES   [address] [place] [address]  Tel: [phone]     Fax: [phone]  ==============================================================================    RAYRAY RODRIGUEZ  [id number]  Female  74y ([birth year])        Patient is a 74y old  Female who presents with a chief complaint of Fever (20 Feb 2025 04:55)    Follow up: seen and examined at bedside, afebrile. More awake and talking. Outside bed in chair. Talked to her daughter 'Zoe' RN on the phone      Allergies  aspirin (Short breath)  cefepime (Anaphylaxis)  meropenem (Unknown)  Percocet (Unknown)  Avelox (Unknown)  shellfish (Anaphylaxis)  OxyContin (Unknown)  codeine (Unknown)  Valium (Unknown)  iodine (Short breath; Swelling)  tetanus toxoid (Short breath)  penicillin (Anaphylaxis)  Dilaudid (Short breath)  codeine (Short breath)  Avelox (Short breath; Pruritus)  ampicillin (Unknown)      ANTIMICROBIALS:  MEDICATIONS  (STANDING):  ertapenem  IVPB   100 mL/Hr IV Intermittent (02-20-25 @ 02:12)    MEDICATIONS  (STANDING):  albuterol/ipratropium for Nebulization 3 milliLiter(s) Nebulizer every 6 hours  apixaban 5 milliGRAM(s) Oral every 12 hours  buDESOnide    Inhalation Suspension 0.5 milliGRAM(s) Inhalation daily  clopidogrel Tablet 75 milliGRAM(s) Oral daily  dextrose 5%. 1000 milliLiter(s) (100 mL/Hr) IV Continuous <Continuous>  dextrose 5%. 1000 milliLiter(s) (50 mL/Hr) IV Continuous <Continuous>  dextrose 50% Injectable 25 Gram(s) IV Push once  dextrose 50% Injectable 12.5 Gram(s) IV Push once  dextrose 50% Injectable 25 Gram(s) IV Push once  ferrous    sulfate 325 milliGRAM(s) Oral daily  gabapentin 200 milliGRAM(s) Oral three times a day  glucagon  Injectable 1 milliGRAM(s) IntraMuscular once  glycopyrrolate 1 milliGRAM(s) Oral three times a day  insulin glargine Injectable (LANTUS) 25 Unit(s) SubCutaneous at bedtime  insulin lispro (ADMELOG) corrective regimen sliding scale   SubCutaneous three times a day before meals  labetalol 100 milliGRAM(s) Oral every 8 hours  levETIRAcetam 1000 milliGRAM(s) Oral two times a day  megestrol 20 milliGRAM(s) Oral daily  methylPREDNISolone 8 milliGRAM(s) Oral two times a day  mexiletine 200 milliGRAM(s) Oral three times a day  mineral oil 30 milliLiter(s) Oral daily  montelukast 10 milliGRAM(s) Oral daily  NIFEdipine XL 30 milliGRAM(s) Oral daily  pantoprazole    Tablet 40 milliGRAM(s) Oral before breakfast  polyethylene glycol 3350 17 Gram(s) Oral daily  rosuvastatin 5 milliGRAM(s) Oral at bedtime  senna 2 Tablet(s) Oral at bedtime  sertraline 50 milliGRAM(s) Oral daily      REVIEW OF SYSTEMS  [  ] ROS unobtainable because:    [ x ] All other systems negative except as noted below:	    Constitutional:  [ ] fever [ ] chills  [ ] weight loss  [ ] weakness  Skin:  [ ] rash [ ] phlebitis	  Eyes: [ ] icterus [ ] pain  [ ] discharge	  ENMT: [ ] sore throat  [ ] thrush [ ] ulcers [ ] exudates  Respiratory: [ ] dyspnea [ ] hemoptysis [ ] cough [ ] sputum	  Cardiovascular:  [ ] chest pain [ ] palpitations [ ] edema	  Gastrointestinal:  [ ] nausea [ ] vomiting [ ] diarrhea [ ] constipation [ ] pain	  Genitourinary:  [ ] dysuria [ ] frequency [ ] hematuria [ ] discharge [ ] flank pain  [ ] incontinence  Musculoskeletal:  [ ] myalgias [ ] arthralgias [ ] arthritis  [ ] back pain  Neurological:  [ ] headache [ ] seizures  [ ] confusion/altered mental status  Psychiatric:  [ ] anxiety [ ] depression	  Hematology/Lymphatics:  [ ] lymphadenopathy  Endocrine:  [ ] adrenal [ ] thyroid  Allergic/Immunologic:	 [ ] transplant [ ] seasonal      PHYSICAL EXAM:  Vital Signs Last 24 Hrs  T(C): 36.8 (21 Feb 2025 11:25), Max: 36.9 (21 Feb 2025 04:48)  T(F): 98.3 (21 Feb 2025 11:25), Max: 98.4 (21 Feb 2025 04:48)  HR: 80 (21 Feb 2025 11:25) (58 - 83)  BP: 126/70 (21 Feb 2025 11:25) (126/70 - 135/58)  BP(mean): --  RR: 18 (21 Feb 2025 11:25) (18 - 18)  SpO2: 90% (21 Feb 2025 11:25) (90% - 96%)    Parameters below as of 21 Feb 2025 11:25  Patient On (Oxygen Delivery Method): room air      Constitutional: Elderly woman, on room air  HEENT: Bilateral proptosis  Cardiovascular:   normal S1, S2, no edema  Respiratory:  Course rhonchi bilateral lung fields  GI:  soft, non-tender, normal bowel sounds, + colostomy bag  :  no ramirez, no CVA tenderness  Musculoskeletal:  no visible deformity  Neurologic: Oriented x 3. No focal  Skin:  warm dry skin  Heme/Onc: no lymphadenopathy     WBC Count: 7.34 K/uL (02-21 @ 08:41)  WBC Count: 10.58 K/uL (02-20 @ 01:50)                          11.3   7.34  )-----------( 231      ( 21 Feb 2025 08:41 )             35.8     02-21    142  |  110[H]  |  13  ----------------------------<  125[H]  4.4   |  26  |  0.65    Ca    9.0      21 Feb 2025 08:41    TPro  6.4  /  Alb  2.8[L]  /  TBili  0.3  /  DBili  x   /  AST  14[L]  /  ALT  14  /  AlkPhos  62  02-21      Creatinine Trend  Creatinine: 0.65 (02-21)  Creatinine: 0.77 (02-20)    Lactate, Blood: 1.3 mmol/L (02-20-25 @ 01:50)      MICROBIOLOGY:  .Blood Blood-Peripheral  02-20-25   No growth at 24 hours  --  --      .Blood Blood-Peripheral  02-20-25   No growth at 24 hours  --  --    SARS-CoV-2: NotDetec (08 Jan 2025 11:50)    MRSA/MSSA PCR (12.13.24 @ 07:08)   MRSA PCR Result.: NotDetec:    Staph aureus PCR Result: NotDetec    Legionella pneumophila Antigen, Urine (12.13.24 @ 10:51)    Legionella Antigen, Urine: Negative      RADIOLOGY:  < from: CT Abdomen and Pelvis No Cont (02.20.25 @ 20:08) >    IMPRESSION:    Limited noncontrast exam.    Colonic distention with fecal retention; no small bowel obstruction.    Other findings as discussed above.    --- End of Report ---    < end of copied text >    ACC: [id number] EXAM:  XR CHEST PORTABLE URGENT 1V   ORDERED BY: SUKHJINDER AGUILERA     PROCEDURE DATE:  02/20/2025      INTERPRETATION:  EXAM: XR CHEST URGENT    INDICATION: Sepsis PXR    COMPARISON: February 2, 2025 chest x-ray and CT performed today    IMPRESSION: No focal infiltrate or congestion. Borderline cardiomegaly on   portable exam. Sternotomy noted. Aortic valve stent graft noted. Osseous   structures unchanged    --- End of Report ---    ACC: [id number] EXAM:  CT ABDOMEN AND PELVIS IC   ORDERED BY: SUKHJINDER AGUILERA     ACC: [id number] [facility]AM:  [facility]   ORDERED BY: SUKHJINDER AGUILERA     PROCEDURE DATE:  02/20/2025      INTERPRETATION:  CLINICAL INFORMATION: Fever and hypoxia. History of   bronchiectasis. Decreased appetite. History of colon cancer.    COMPARISON: 1/8/2025, 12/12/2024    CONTRAST/COMPLICATIONS:  IV Contrast: Omnipaque 350  90 cc administered   10 cc discarded  Oral Contrast: NONE    PROCEDURE:  CT Angiography of the Chest was performed followed by portal venous phase   imaging of the Abdomen and Pelvis.  Sagittal and coronal reformats were performed as well as 3D (MIP)   reconstructions.    FINDINGS:  CHEST:  LUNGS AND LARGE AIRWAYS: Patent central airways. No pulmonary nodules. No   consolidation or parenchymal infiltration. Motion artifact limits   detailed evaluation of the lung parenchyma.  PLEURA: No pleural effusion.  VESSELS: Known type A aortic dissection starting at the innominate artery   extending to the infrarenal aorta. Extensive atherosclerotic   calcification. Aortic valve replacement.  HEART: Heart size is normal. No pericardial effusion.  MEDIASTINUM AND MARANDA: No lymphadenopathy.  CHEST WALL AND LOWER NECK: Within normal limits.    ABDOMEN AND PELVIS:  LIVER: Within normal limits.  BILE DUCTS: Normal caliber.  GALLBLADDER: Within normal limits.  SPLEEN: Within normal limits.  PANCREAS: No change of hypodense lesions in or around the pancreatic tail.  ADRENALS: Within normal limits.  KIDNEYS/URETERS: Bilateral hypodense lesions with variable attenuation   more prominent on the left. No perinephric stranding or hydronephrosis.    BLADDER: Overdistended. No wall thickening.  REPRODUCTIVE ORGANS: Hysterectomy.    BOWEL: No bowel obstruction. Appendix is not visualized. Status post   rectosigmoid resection with left anterior colostomy. Large stool burden.  PERITONEUM/RETROPERITONEUM: Within normal limits.  VESSELS: Atherosclerotic changes. Dissection flap extending to the   infrarenal aorta.  LYMPH NODES: No lymphadenopathy.  ABDOMINAL WALL: Left lower colostomy.  BONES: Surgical hardware in the right sacroiliac joint and symphysis   pubis. Avascular necrosis of both femoral heads.    IMPRESSION:  No parenchymal infiltration or consolidation.  No intra-abdominal abscess or evidence of enterocolitis.  Known type A aortic dissection.  Stable chronic findings as described.      --- End of Report --      ACC: [id number] EXAM:  CT ABDOMEN AND PELVIS IC   ORDERED BY: SUKHJINDER AGUILERA     ACC: [id number] EXAM:  CT ANGIO CHEST PULM ART [facility]   ORDERED BY: SUKHJINDER AGUILERA     PROCEDURE DATE:  02/20/2025        INTERPRETATION:  CLINICAL INFORMATION: Fever and hypoxia. History of   bronchiectasis. Decreased appetite. History of colon cancer.    COMPARISON: 1/8/2025, 12/12/2024    CONTRAST/COMPLICATIONS:  IV Contrast: Omnipaque 350  90 cc administered   10 cc discarded  Oral Contrast: NONE      PROCEDURE:  CT Angiography of the Chest was performed followed by portal venous phase   imaging of the Abdomen and Pelvis.  Sagittal and coronal reformats were performed as well as 3D (MIP)   reconstructions.    FINDINGS:  CHEST:  LUNGS AND LARGE AIRWAYS: Patent central airways. No pulmonary nodules. No   consolidation or parenchymal infiltration. Motion artifact limits   detailed evaluation of the lung parenchyma.  PLEURA: No pleural effusion.  VESSELS: Known type A aortic dissection starting at the innominate artery   extending to the infrarenal aorta. Extensive atherosclerotic   calcification. Aortic valve replacement.  HEART: Heart size is normal. No pericardial effusion.  MEDIASTINUM AND MARANDA: No lymphadenopathy.  CHEST WALL AND LOWER NECK: Within normal limits.    ABDOMEN AND PELVIS:  LIVER: Within normal limits.  BILE DUCTS: Normal caliber.  GALLBLADDER: Within normal limits.  SPLEEN: Within normal limits.  PANCREAS: No change of hypodense lesions in or around the pancreatic tail.  ADRENALS: Within normal limits.  KIDNEYS/URETERS: Bilateral hypodense lesions with variable attenuation   more prominent on the left. No perinephric stranding or hydronephrosis.    BLADDER: Overdistended. No wall thickening.  REPRODUCTIVE ORGANS: Hysterectomy.    BOWEL: No bowel obstruction. Appendix is not visualized. Status post   rectosigmoid resection with left anterior colostomy. Large stool burden.  PERITONEUM/RETROPERITONEUM: Within normal limits.  VESSELS: Atherosclerotic changes. Dissection flap extending to the   infrarenal aorta.  LYMPH NODES: No lymphadenopathy.  ABDOMINAL WALL: Left lower colostomy.  BONES: Surgical hardware in the right sacroiliac joint and symphysis   pubis. Avascular necrosis of both femoral heads.    IMPRESSION:  No parenchymal infiltration or consolidation.  No intra-abdominal abscess or evidence of enterocolitis.  Known type A aortic dissection.  Stable chronic findings as described.      --- End of Report ---    I have personally reviewed the above imaging

## 2025-02-21 NOTE — SWALLOW BEDSIDE ASSESSMENT ADULT - ASR SWALLOW LABIAL MOBILITY
Physical Therapy Visit    Visit Type: Daily Treatment Note  Visit: 3  Referring Provider: Arsenio Vences DO  Medical Diagnosis (from order): M54.31 - Sciatica of right side     OBJECTIVE                                                                                                                                       Treatment     Therapeutic Exercise  Reviewed and educated patient on basic anatomy and function of core musculature.    Transverse abdominis engagement   Transverse abdominis with slow march 2x10    Single knee to chest x20 seconds each leg  Piriformis stretch x20 seconds each leg    Pelvic rotation assessment- right leg slightly longer, right anterior innominate rotation  METS utililized with hips at 90/90- right pushing into extension       Manual Therapy   Soft tissue mobilization to right piriformis with piriformis stretch in hook lying    Gait Training  Patient requested to be instructed on cane use for at home when she is upstairs in her home.  She was instructed in using the cane in her left hand and using proper sequence.  She demonstrated understanding and was ambulating throughout the facility safely.  Also instruction on cane use with one rail and a cane.      Skilled input: verbal instruction/cues, tactile instruction/cues, posture correction and demonstration    Writer verbally educated and received verbal consent for hand placement, positioning of patient, and techniques to be performed today from patient   Home Exercise Program  Access Code: [email or web address]  URL: [email or web address]  Date: 02/19/2025  Prepared by: Jose Alfredo KOROMA    Exercises  - Supine Transversus Abdominis Bracing - Hands on Stomach  - 2-3 x daily - 7 x weekly - 5 hold  - Supine March  - 1 x daily - 7 x weekly - 2 sets - 10 reps - 1 hold  - Supine Single Knee to Chest Stretch  - 1-2 x daily - 7 x weekly - 1 sets - 2 reps - 20 hold  - Supine Piriformis Stretch with Foot on Ground  - 1-2 x daily - 7 x  weekly - 1 sets - 2 reps - 20 hold    ASSESSMENT                                                                                                          Education:   - Results of above outlined education: Verbalizes understanding and Demonstrates understanding    PLAN                                                                                                                           Suggestions for next session as indicated: Progress per plan of care with core and lumbar stability, hamstring stretch as tolerated, and lower extremity strengthening   Consider Soft tissue mobilization to right si joint      Therapy procedure time and total treatment time can be found documented on the Time Entry flowsheet     within functional limits

## 2025-02-21 NOTE — PROGRESS NOTE ADULT - PROBLEM SELECTOR PLAN 9
Chronic  - On home Lantus 30U in pm, 18 in AM, Novolog sliding scale  - Lantus 25u qhs low dose iss, fingersticks, hypoglycemia protocol  - F/u AM A1C. Chronic  - On home Lantus 30U in pm, 18 in AM, Novolog sliding scale  - Lantus 25u qhs low dose iss, fingersticks, hypoglycemia protocol  - F/u AM A1C.-7.8

## 2025-02-21 NOTE — DISCHARGE NOTE NURSING/CASE MANAGEMENT/SOCIAL WORK - FINANCIAL ASSISTANCE
[facility] provides services at a reduced cost to those who are determined to be eligible through [facility]’s financial assistance program. Information regarding [facility]’s financial assistance program can be found by going to [email or web address] or by calling [phone].

## 2025-02-21 NOTE — PROGRESS NOTE ADULT - TIME BILLING
Reviewed with patient in detail, family at bedside, Hospitalist, Surgical Residents and PA's as well as today's RN team.

## 2025-02-21 NOTE — PHARMACOTHERAPY INTERVENTION NOTE - COMMENTS
Medication reconciliation completed on admission with daughter via home medication list. Updated medication list in OMR/prescription writer.    Home Medications:  ascorbic acid 500 mg oral tablet: 1 tab(s) orally once a day (in the morning) (21 Feb 2025 15:05)  Breo Ellipta 200 mcg-25 mcg/inh inhalation powder: 1 puff(s) inhaled once a day (21 Feb 2025 14:59)  budesonide 1 mg/2 mL inhalation suspension: 2 milliliter(s) by nebulizer 2 times a day (21 Feb 2025 14:59)  clopidogrel 75 mg oral tablet: 1 tab(s) orally once a day (21 Feb 2025 14:59)  Cranberry oral tablet: 1 tab(s) orally once a day (in the morning) (21 Feb 2025 15:05)  DuoNeb 0.5 mg-2.5 mg/3 mL inhalation solution: 3 milliliter(s) by nebulizer 4 times a day (21 Feb 2025 14:59)  Eliquis 5 mg oral tablet: 1 tab(s) orally 2 times a day (21 Feb 2025 14:59)  ferrous sulfate: 325 milligram(s) orally once a day (21 Feb 2025 14:59)  gabapentin 100 mg oral capsule: 200 milligram(s) orally 3 times a day (21 Feb 2025 14:59)  glycopyrrolate 1 mg oral tablet: 1 tab(s) orally 3 times a day (21 Feb 2025 14:59)  Incruse Ellipta 62.5 mcg/inh inhalation powder: 1 inhaler(s) inhaled once a day (21 Feb 2025 15:04)  Keppra 500 mg oral tablet: 2 tab(s) orally 2 times a day (21 Feb 2025 15:40)  labetalol 100 mg oral tablet: 1 tab(s) orally every 8 hours (21 Feb 2025 14:59)  lacosamide 100 mg oral tablet: 1 tab(s) orally 2 times a day (21 Feb 2025 15:05)  Lantus Solostar Pen 100 units/mL subcutaneous solution: 23 unit(s) subcutaneous once a day (in the morning) (21 Feb 2025 14:46)  Lantus Solostar Pen 100 units/mL subcutaneous solution: 28 unit(s) subcutaneous once a day (at bedtime) (21 Feb 2025 14:46)  Lantus Solostar Pen 100 units/mL subcutaneous solution: 28 subcutaneous once a day (at bedtime) (21 Feb 2025 14:52)  megestrol 20 mg oral tablet: 1 tab(s) orally once a day as needed for appetite (21 Feb 2025 14:59)  methylPREDNISolone 8 mg oral tablet: 1 tab(s) orally once a day (21 Feb 2025 14:59)  mexiletine 200 mg oral capsule: 1 cap(s) orally 3 times a day (21 Feb 2025 14:59)  montelukast 10 mg oral tablet: 1 tab(s) orally once a day (at bedtime) (21 Feb 2025 14:59)  NIFEdipine 30 mg oral tablet, extended release: 1 tab(s) orally once a day (21 Feb 2025 14:59)  NovoLOG 100 units/mL injectable solution: Patient follows sliding scale instructions (21 Feb 2025 15:26)  Ohtuvayre 3 mg/ 2.5mL inhalation suspension: 3 milligram(s) by nebulizer 2 times a day (21 Feb 2025 15:04)  pantoprazole 40 mg oral delayed release tablet: 1 tab(s) orally once a day (21 Feb 2025 14:59)  Perforomist 20 mcg/2 mL inhalation solution: 2 milliliter(s) inhaled 2 times a day (21 Feb 2025 14:56)  rosuvastatin 5 mg oral tablet: 1 tab(s) orally once a day (at bedtime) (21 Feb 2025 15:00)  Senna 8.6 mg oral tablet: 2 tab(s) orally once a day (21 Feb 2025 15:04)  sertraline 50 mg oral tablet: 1 tab(s) orally once a day (in the morning) as needed for  anxiety (21 Feb 2025 15:00)  Sodium Chloride 7% Inhalation Solution: twice daily (21 Feb 2025 15:04)  [place] Pre-filled Pen 210 mg/1.91 mL subcutaneous solution: 210 milligram(s) subcutaneously once a month (21 Feb 2025 14:59)    Kayla Parson, PharmD  PGY-1 Pharmacy Resident  [phone] or Teams Medication reconciliation completed on admission with daughter via home medication list. Updated medication list in OMR/prescription writer.    Home Medications:  ascorbic acid 500 mg oral tablet: 1 tab(s) orally once a day (in the morning) (21 Feb 2025 15:05)  Breo Ellipta 200 mcg-25 mcg/inh inhalation powder: 1 puff(s) inhaled once a day (21 Feb 2025 14:59)  budesonide 1 mg/2 mL inhalation suspension: 2 milliliter(s) by nebulizer 2 times a day (21 Feb 2025 14:59)  clopidogrel 75 mg oral tablet: 1 tab(s) orally once a day (21 Feb 2025 14:59)  Cranberry oral tablet: 1 tab(s) orally once a day (in the morning) (21 Feb 2025 15:05)  DuoNeb 0.5 mg-2.5 mg/3 mL inhalation solution: 3 milliliter(s) by nebulizer 4 times a day (21 Feb 2025 14:59)  Eliquis 5 mg oral tablet: 1 tab(s) orally 2 times a day (21 Feb 2025 14:59)  ferrous sulfate: 325 milligram(s) orally once a day (21 Feb 2025 14:59)  gabapentin 100 mg oral capsule: 200 milligram(s) orally 3 times a day (21 Feb 2025 14:59)  glycopyrrolate 1 mg oral tablet: 1 tab(s) orally 3 times a day (21 Feb 2025 14:59)  Incruse Ellipta 62.5 mcg/inh inhalation powder: 1 inhaler(s) inhaled once a day (21 Feb 2025 15:04)  Keppra 500 mg oral tablet: 2 tab(s) orally 2 times a day (21 Feb 2025 15:40)  labetalol 100 mg oral tablet: 1 tab(s) orally every 8 hours (21 Feb 2025 14:59)  lacosamide 100 mg oral tablet: 1 tab(s) orally 2 times a day (21 Feb 2025 15:05)  Lantus Solostar Pen 100 units/mL subcutaneous solution: 23 unit(s) subcutaneous once a day (in the morning) (21 Feb 2025 14:46)  Lantus Solostar Pen 100 units/mL subcutaneous solution: 28 unit(s) subcutaneous once a day (at bedtime) (21 Feb 2025 14:46)  megestrol 20 mg oral tablet: 1 tab(s) orally once a day as needed for appetite (21 Feb 2025 14:59)  methylPREDNISolone 8 mg oral tablet: 1 tab(s) orally once a day (21 Feb 2025 14:59)  mexiletine 200 mg oral capsule: 1 cap(s) orally 3 times a day (21 Feb 2025 14:59)  montelukast 10 mg oral tablet: 1 tab(s) orally once a day (at bedtime) (21 Feb 2025 14:59)  NIFEdipine 30 mg oral tablet, extended release: 1 tab(s) orally once a day (21 Feb 2025 14:59)  NovoLOG 100 units/mL injectable solution: Patient follows sliding scale instructions (21 Feb 2025 15:26)  Ohtuvayre 3 mg/ 2.5mL inhalation suspension: 3 milligram(s) by nebulizer 2 times a day (21 Feb 2025 15:04)  pantoprazole 40 mg oral delayed release tablet: 1 tab(s) orally once a day (21 Feb 2025 14:59)  Perforomist 20 mcg/2 mL inhalation solution: 2 milliliter(s) inhaled 2 times a day (21 Feb 2025 14:56)  rosuvastatin 5 mg oral tablet: 1 tab(s) orally once a day (at bedtime) (21 Feb 2025 15:00)  Senna 8.6 mg oral tablet: 2 tab(s) orally once a day (21 Feb 2025 15:04)  sertraline 50 mg oral tablet: 1 tab(s) orally once a day (in the morning) as needed for  anxiety (21 Feb 2025 15:00)  Sodium Chloride 7% Inhalation Solution: twice daily (21 Feb 2025 15:04)  [place] Pre-filled Pen 210 mg/1.91 mL subcutaneous solution: 210 milligram(s) subcutaneously once a month (21 Feb 2025 14:59)    Kayla Parson, PharmD  PGY-1 Pharmacy Resident  [phone] or Teams

## 2025-02-21 NOTE — PROGRESS NOTE ADULT - SUBJECTIVE AND OBJECTIVE BOX
Date/Time Patient Seen:  		  Referring MD:   Data Reviewed	       Patient is a 74y old  Female who presents with a chief complaint of Fever (20 Feb 2025 22:47)      Subjective/HPI     PAST MEDICAL & SURGICAL HISTORY:  Atrial fibrillation  paroxysmal, on eliquis    Diabetes  Type 2    Hypertension    COPD (chronic obstructive pulmonary disease)    Adrenal insufficiency  Medrol daily for over 50 years    Aortic insufficiency  moderate AR on echo 5/3/2018    Pelvic fracture    Asthma    Hypertension    Tracheobronchomalacia  diagnosed 2015, s/p bronchial thermoplasty 2016 (Dr Zapien); recent bronchoscopy 6/5/2018 revealed no evidence of tracheobronchomalacia in trachea or bronchial tubes    Colorectal cancer  4/2018- last treatment , chemo and radiation    Aortic disease  leaky valve    Rectal bleeding    Seizure  x 1 1/7/18    DVT (deep venous thrombosis)  15-20 years ago, took coumadin    TIA (transient ischemic attack)  multiple, last 5 years ago - presents as right-sided weakness    COPD (chronic obstructive pulmonary disease)    Atrial fibrillation    History of COPD    Afib    Aortic valve replaced    Epilepsy    Asthma    DM (diabetes mellitus)    History of seizure disorder    History of TIAs    HTN (hypertension)    Bronchiectasis    Colon cancer    History of partial hysterectomy  30 years ago - fibroids    H/O total knee replacement, bilateral  5 years ago    History of sinus surgery  multiple sinus surgeries    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    H/O pelvic surgery  5 years ago - s/p fracture    History of surgery  tracheo thermoplasty 2016    History of tracheomalacia  2015 - attempted tracheal stenting ([facility])- course complicated by obstruction, respiratory failure, multiple CPR attempts -  stent discontinued; 10/20/2016 Tracheobronchoplasty (Prolene Mesh) performed at [facility] by Dr Zapien    S/P bronchoscopy  6/5/2018 - [place] [facility] (Dr Zapien) no evidence of tracheobronchomalacia in trachea or bronchial tubes    Rectal bleeding  exam under anesthesia ([facility]) 2/2018    S/P TAVR (transcatheter aortic valve replacement)    S/P aortic valve replacement    No significant past surgical history    S/P colostomy    S/P AVR (aortic valve replacement)          Medication list         MEDICATIONS  (STANDING):  albuterol/ipratropium for Nebulization 3 milliLiter(s) Nebulizer every 6 hours  apixaban 5 milliGRAM(s) Oral every 12 hours  buDESOnide    Inhalation Suspension 0.5 milliGRAM(s) Inhalation daily  clopidogrel Tablet 75 milliGRAM(s) Oral daily  dextrose 5%. 1000 milliLiter(s) (100 mL/Hr) IV Continuous <Continuous>  dextrose 5%. 1000 milliLiter(s) (50 mL/Hr) IV Continuous <Continuous>  dextrose 50% Injectable 25 Gram(s) IV Push once  dextrose 50% Injectable 12.5 Gram(s) IV Push once  dextrose 50% Injectable 25 Gram(s) IV Push once  ferrous    sulfate 325 milliGRAM(s) Oral daily  gabapentin 200 milliGRAM(s) Oral three times a day  glucagon  Injectable 1 milliGRAM(s) IntraMuscular once  glycopyrrolate 1 milliGRAM(s) Oral three times a day  insulin glargine Injectable (LANTUS) 25 Unit(s) SubCutaneous at bedtime  insulin lispro (ADMELOG) corrective regimen sliding scale   SubCutaneous three times a day before meals  labetalol 100 milliGRAM(s) Oral every 8 hours  levETIRAcetam 1000 milliGRAM(s) Oral two times a day  megestrol 20 milliGRAM(s) Oral daily  methylPREDNISolone 8 milliGRAM(s) Oral two times a day  mexiletine 200 milliGRAM(s) Oral three times a day  mineral oil 30 milliLiter(s) Oral daily  montelukast 10 milliGRAM(s) Oral daily  NIFEdipine XL 30 milliGRAM(s) Oral daily  pantoprazole    Tablet 40 milliGRAM(s) Oral before breakfast  polyethylene glycol 3350 17 Gram(s) Oral daily  rosuvastatin 5 milliGRAM(s) Oral at bedtime  senna 2 Tablet(s) Oral at bedtime  sertraline 50 milliGRAM(s) Oral daily    MEDICATIONS  (PRN):  albuterol    90 MICROgram(s) HFA Inhaler 2 Puff(s) Inhalation every 6 hours PRN for shortness of breath and/or wheezing  dextrose Oral Gel 15 Gram(s) Oral once PRN Blood Glucose LESS THAN 70 milliGRAM(s)/deciliter         Vitals log        ICU Vital Signs Last 24 Hrs  T(C): 36.9 (21 Feb 2025 04:48), Max: 36.9 (20 Feb 2025 06:29)  T(F): 98.4 (21 Feb 2025 04:48), Max: 98.5 (20 Feb 2025 06:29)  HR: 60 (21 Feb 2025 04:48) (57 - 83)  BP: 135/58 (21 Feb 2025 04:48) (113/53 - 135/58)  BP(mean): 71 (20 Feb 2025 06:29) (71 - 71)  ABP: --  ABP(mean): --  RR: 18 (21 Feb 2025 04:48) (18 - 23)  SpO2: 96% (21 Feb 2025 04:48) (93% - 96%)    O2 Parameters below as of 21 Feb 2025 04:48  Patient On (Oxygen Delivery Method): room air                 Input and Output:  I&O's Detail    20 Feb 2025 07:01  -  21 Feb 2025 05:23  --------------------------------------------------------  IN:  Total IN: 0 mL    OUT:    Voided (mL): 1350 mL  Total OUT: 1350 mL    Total NET: -1350 mL          Lab Data                        11.8   10.58 )-----------( 200      ( 20 Feb 2025 01:50 )             37.8     02-20    139  |  106  |  15  ----------------------------<  116[H]  3.8   |  28  |  0.77    Ca    8.8      20 Feb 2025 01:50    TPro  6.5  /  Alb  3.0[L]  /  TBili  0.5  /  DBili  x   /  AST  16  /  ALT  17  /  AlkPhos  63  02-20            Review of Systems	      Objective     Physical Examination  heart s1s2  lung dc BS  head nc  head at  abd soft        Pertinent Lab findings & Imaging      Seven:  NO   Adequate UO     I&O's Detail    20 Feb 2025 07:01  -  21 Feb 2025 05:23  --------------------------------------------------------  IN:  Total IN: 0 mL    OUT:    Voided (mL): 1350 mL  Total OUT: 1350 mL    Total NET: -1350 mL               Discussed with:     Cultures:	        Radiology

## 2025-02-21 NOTE — PROGRESS NOTE ADULT - PROBLEM SELECTOR PLAN 3
Hx of COPD not on home O2  - On steroids, will continue methyl prednisone 2x day  - Cont home nebs inhalers Hx of COPD not on home O2  - On steroids, will continue methyl prednisone 8 mg  2x day  - Cont All  home nebs inhalers

## 2025-02-21 NOTE — PROGRESS NOTE ADULT - PROBLEM/PLAN-7
Addended by: Justin Reid on: 1/5/2022 02:32 PM     Modules accepted: Orders
DISPLAY PLAN FREE TEXT
DISPLAY PLAN FREE TEXT

## 2025-02-21 NOTE — DISCHARGE NOTE PROVIDER - HOSPITAL COURSE
FROM ADMISSION H+P:   HPI:  74yFemale pmhx of Epilepsy on Keppra, COPD,, DM2  asthma on chronic steroids, bronchiectasis, tracheomalacia s/p tracheloplasty HTN, Hx of dissection of descending thoracic aorta, , hx of aortic aneurysm,  Type B 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  Evaluated by Dr. Antonio,  Type A dissection s/p bioAVR with Bental procedure (9/2022), severe AS s/p TAVR (9/10/2023), TIA's, DVT Afib on Eliquis, T2DM, Colon cancer S/P resection, Colostomy bag , neuropathy, presents to the ED for fever. Pt reports having past 2-3 days of cough associated with fever. Patient endorses increased weakness and fatigue. Patient has hx of recurrent PNA recently admitted for COPD exacerbation 1/8/25  Currently on Doxycycline prescribed by PCP    ED course  Vitals: T: 99.9F , HR: 60 , BP: 111/89 , RR: 20 , SpO2:  100% on RA  Significant labs: WBC 10.58, Hgb 11.8, Hct 37.8,  Na 139, K 3.8, BUN 15, Cr .77  Lactate 1.3, ProCalcitonin .08  Troponin 28.2  ProBNP 1896  Imaging:   CT ANGIO PE, ABD, PELV IV   No parenchymal infiltration or consolidation.  No intra-abdominal abscess or evidence of enterocolitis.  Known type A aortic dissection.  Stable chronic findings as described.    In ED patient given: OFIRMEV, ertapenem, benadryl, 1L NS Bolus     (20 Feb 2025 04:55)      ---  HOSPITAL COURSE/PERTINENT LABS/PROCEDURES PERFORMED/PENDING TESTS:    ---  PATIENT CONDITION:  - stable    ---  PHYSICAL EXAM ON DAY OF DISCHARGE:  GENERAL:  [ x ] NAD, [ x ] Well appearing, [  ] Agitated, [  ] Drowsy, [  ] Lethargy, [  ] Confused   HEAD:  [ x ] Normal, [  ] Other  EYES:  [ x ] EOMI, [ x ] PERRLA, [ x ] Conjunctiva and sclera clear normal, [  ] Other, [  ] Pallor, [  ] Discharge  ENMT:  [ x ] Normal, [ x ] Moist mucous membranes, [  ] Good dentition, [  ] No thrush  NECK:  [ x ] Supple, [  ] No JVD, [ x ] Normal thyroid, [  ] Lymphadenopathy, [  ] Other  CHEST/LUNG:  [ x ] Clear to auscultation bilaterally, [ x ] Breath Sounds  decreased, [  ] Poor effort, [ x ] No rales, [ x ] coarse, rhonchorous breath sounds bilaterally, productive cough, [ x ] No wheezing  HEART:  [ x ] Regular rate and rhythm, [  ] Tachycardia, [  ] Bradycardia, [  ] Irregular, [ x ] No murmurs, No rubs, No gallops, [  ] PPM in place (Mfr:  )  ABDOMEN:  [ x ] Soft, [ x ] Nondistended, [ x ] No mass, [ x ] Bowel sounds present, [  ] Obese  NERVOUS SYSTEM:  [ x ] Alert & Oriented x3__, [ x ] Nonfocal, [  ] Confusion, [  ] Encephalopathic, [  ] Sedated, [  ] Unable to assess, [  ] Dementia, [  ] Other-  EXTREMITIES:  [ x ] 2+ Peripheral Pulses, No clubbing, No cyanosis,  [  ] Edema B/L lower EXT, [  ] PVD stasis skin changes B/L lower EXT, [  ] Wound  LYMPH:  No lymphadenopathy noted  SKIN:  [ x ] No rashes or lesions, [  ] Pressure ulcers, [  ] Ecchymosis, [  ] Skin tears, [  ] Other    ---  CONSULTANTS:   Kostay: Dr. Benavides  Pulmonology: Dr. Luis   Infectious Disease: Dr. Soto     ---  ADVANCED CARE PLANNING:  - Code status:    full Code   - MOLST completed:      [ X ] NO     [  ] YES    ---  TIME SPENT:  I, the attending physician, was physically present for the key portions of the evaluation and management (E/M) service provided. 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  Evaluated by Dr. Antonio,  Type A dissection s/p bioAVR with Bental procedure (9/2022), severe AS s/p TAVR (9/10/2023), TIA's, DVT Afib on Eliquis, T2DM, Colon cancer S/P resection, Colostomy bag , neuropathy, presents to the ED for fever. Pt reports having past 2-3 days of cough associated with fever. Patient endorses increased weakness and fatigue. Patient has hx of recurrent PNA recently admitted for COPD exacerbation 1/8/25  Currently on Doxycycline prescribed by PCP    ED course  Vitals: T: 99.9F , HR: 60 , BP: 111/89 , RR: 20 , SpO2:  100% on RA  Significant labs: WBC 10.58, Hgb 11.8, Hct 37.8,  Na 139, K 3.8, BUN 15, Cr .77  Lactate 1.3, ProCalcitonin .08  Troponin 28.2  ProBNP 1896  Imaging:   CT ANGIO PE, ABD, PELV IV   No parenchymal infiltration or consolidation.  No intra-abdominal abscess or evidence of enterocolitis.  Known type A aortic dissection.  Stable chronic findings as described.    In ED patient given: OFIRMEV, ertapenem, benadryl, 1L NS Bolus     (20 Feb 2025 04:55)      ---  HOSPITAL COURSE/PERTINENT LABS/PROCEDURES PERFORMED/PENDING TESTS:  Pt was found to have AC urinary retention 1 lit urine drained, Found to have constipation on CT A/p  urine retention resolved.  Pt seen  by ID-Dr Peng on IV Invanz, No fever, RVP-Neg-STOP Invanz, Restart home Doxy 2x day for 2 more days, Blood c/s -NEG to date  Seen by Pulmonary Dr Luis -No need for Ab x CT Chest shows chronic findings   Seen By Sx for Constipation with no colostomy out put  Rxed for constipation with Miralax & Mineral Oil   PT--> Home PT , [facility] arranged  D/W CM -stable for d/c home , d/w dtr at detail, agrees for d/c    ---  PATIENT CONDITION:  - stable    ---  PHYSICAL EXAM ON DAY OF DISCHARGE:  GENERAL:  [ x ] NAD, [ x ] Well appearing, [  ] Agitated, [  ] Drowsy, [  ] Lethargy, [  ] Confused   HEAD:  [ x ] Normal, [  ] Other  EYES:  [ x ] EOMI, [ x ] PERRLA, [ x ] Conjunctiva and sclera clear normal, [  ] Other, [  ] Pallor, [  ] Discharge  ENMT:  [ x ] Normal, [ x ] Moist mucous membranes, [  ] Good dentition, [  ] No thrush  NECK:  [ x ] Supple, [  ] No JVD, [ x ] Normal thyroid, [  ] Lymphadenopathy, [  ] Other  CHEST/LUNG:  [ x ] Clear to auscultation bilaterally, [ x ] Breath Sounds  decreased, [  ] Poor effort, [ x ] No rales, [ x ] coarse, rhonchorous breath sounds bilaterally, productive cough, [ x ] No wheezing  HEART:  [ x ] Regular rate and rhythm, [  ] Tachycardia, [  ] Bradycardia, [  ] Irregular, [ x ] No murmurs, No rubs, No gallops, [  ] PPM in place (Mfr:  )  ABDOMEN:  [ x ] Soft, [ x ] Nondistended, [ x ] No mass, [ x ] Bowel sounds present, [  ] Obese  NERVOUS SYSTEM:  [ x ] Alert & Oriented x3__, [ x ] Nonfocal, [  ] Confusion, [  ] Encephalopathic, [  ] Sedated, [  ] Unable to assess, [  ] Dementia, [  ] Other-  EXTREMITIES:  [ x ] 2+ Peripheral Pulses, No clubbing, No cyanosis,  [  ] Edema B/L lower EXT, [  ] PVD stasis skin changes B/L lower EXT, [  ] Wound  LYMPH:  No lymphadenopathy noted  SKIN:  [ x ] No rashes or lesions, [  ] Pressure ulcers, [  ] Ecchymosis, [  ] Skin tears, [  ] Other    ---  CONSULTANTS:   Kay: Dr. Benavides  Pulmonology: Dr. Luis   Infectious Disease: Dr. Soto     ---  ADVANCED CARE PLANNING:  - Code status:    full Code   - MOLST completed:      [ X ] NO     [  ] YES    ---  TIME SPENT:  I, the attending physician, was physically present for the key portions of the evaluation and management (E/M) service provided. The total amount of time spent reviewing the hospital notes, laboratory values, imaging findings, assessing/counseling the patient, discussing with consultant physicians, social work, nursing staff was 60 minutes

## 2025-02-21 NOTE — PROGRESS NOTE ADULT - ASSESSMENT
The patient is 75 y/o woman with PMH of epilepsy, COPD, DM, bronchiectasis, tracheomalacia s/p tracheloplasty, HTN, hx of dissection of descending thoracic aorta, hx of aortic aneurysm, type B dissection (7/2024), type A dissection s/p bio AVR with Bentall procedure (9/2022), severe AS s/p TAVR (9/10/2023), TIA's, Afib on Eliquis, colon cancer s/p colostomy, who presents to the ED for fever. Patient reports having cough for past 2-3 days, associated with fever. Patient endorses increased weakness and fatigue. Patient has hx of recurrent PNA recently admitted to [facility] for COPD exacerbation. Currently on Doxycycline prescribed by PCP. Afebrile in ED. Work up shows: WBC 10.58, Cr 0.77, Lactate 1.3, Procalcitonin 0.08. CTA shows no parenchymal infiltration or consolidation. No intra-abdominal abscess or evidence of enterocolitis. Known type A aortic dissection. Started on ertapenem empirically. ID consulted for workup and antibiotic management     2/20: Afebrile here. Reportedly, patient had fever and hypoxia at home. Not able to provide much history due to being lethargic, goes back to sleep while talking. Comfortable on room air. No leukocytosis.  RVP negative. Procal negative. Elevated Pro BNP. QuantiFeron checked 01/08/25 was negative. Prior sputum and blood cultures negative. CTA chest images and report reviewed and compared with her prior imaging. She has had many admissions with COPD exacerbations, possible pneumonia.   - Noted numerous allergies listed to PCN, meropenem, cefepime etc. Known hx of aortic aneurysm and dissection. (contra-indication to FQs). Seizure disorder - caution with carbapenems needed.    2/21:    Impression  # COPD exacerbation  # r/o pneumonia  # Allergic to medications    Recommendations:  -Would favor to monitor off of antibiotics  -Can do a short course of zithromax (tolerated in past)  -Follow blood and sputum cultures  -Obtain MRSA screen, urine legionella and pneumococcal Ag  -Consider pulmonary eval for known COPD/bronchiectasis  -Monitor white count and temp curve  -Supplemental oxygen as needed to maintain SpO2 88-92%    ID will follow  Discussed with primary team    More Soto MD  Attending Physician  Division of Infectious Diseases   Available via [employer] Teams     The patient is 75 y/o woman with PMH of epilepsy, COPD, DM, bronchiectasis, tracheomalacia s/p tracheloplasty, HTN, hx of dissection of descending thoracic aorta, hx of aortic aneurysm, type B dissection (7/2024), type A dissection s/p bio AVR with Bentall procedure (9/2022), severe AS s/p TAVR (9/10/2023), TIA's, Afib on Eliquis, colon cancer s/p colostomy, who presents to the ED for fever. Patient reports having cough for past 2-3 days, associated with fever. Patient endorses increased weakness and fatigue. Patient has hx of recurrent PNA recently admitted to [facility] for COPD exacerbation. Currently on Doxycycline prescribed by PCP. Afebrile in ED. Work up shows: WBC 10.58, Cr 0.77, Lactate 1.3, Procalcitonin 0.08. CTA shows no parenchymal infiltration or consolidation. No intra-abdominal abscess or evidence of enterocolitis. Known type A aortic dissection. Started on ertapenem empirically. ID consulted for workup and antibiotic management     2/20: Afebrile here. Reportedly, patient had fever and hypoxia at home. Not able to provide much history due to being lethargic, goes back to sleep while talking. Comfortable on room air. No leukocytosis.  RVP negative. Procal negative. Elevated Pro BNP. QuantiFeron checked 01/08/25 was negative. Prior sputum and blood cultures negative. CTA chest images and report reviewed and compared with her prior imaging. She has had many admissions with COPD exacerbations, possible pneumonia.   - Noted numerous allergies listed to PCN, meropenem, cefepime etc. Known hx of aortic aneurysm and dissection. (contra-indication to FQs). Seizure disorder - caution with carbapenems needed.    2/21: Afebrile. No leukocytosis. Comfortable on room air. The daughter says that her mother was on doxycycline per her ID physician, Dr. Afshan Stout for 'staph infection'. Repeat CT A/P and surgery evaluation noted    Impression  # COPD exacerbation  # r/o pneumonia  # Allergic to medications    Recommendations:  -Doxycycline 100 mg PO q12h x 7 days  -Follow blood and sputum cultures  -Appreciate pulmonary eval for known COPD/bronchiectasis  -Monitor white count and temp curve  -Supplemental oxygen as needed to maintain SpO2 88-92%    ID will follow  Discussed with primary team    More Soto MD  Attending Physician  Division of Infectious Diseases   Available via [employer] Teams

## 2025-02-21 NOTE — PROGRESS NOTE ADULT - ASSESSMENT
74yFemale pmhx of Epilepsy on Keppra, COPD,, DM2  asthma on chronic steroids, bronchiectasis, tracheomalacia s/p tracheloplasty HTN, Hx of dissection of descending thoracic aorta, , hx of aortic aneurysm,  Type B 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  Evaluated by Dr. Antonio,  Type A dissection s/p bioAVR with Bental procedure (9/2022), severe AS s/p TAVR (9/10/2023), TIA's, DVT Afib on Eliquis, T2DM, Colon cancer, neuropathy, presents to the ED for shortness of breath. Patient reports for the past 1-2 days having increased chest pressure out of her baseline, states she has also had cough, wheezing and shortness of breath with minimal relief after using her normal regimen of inhalers and nebulizers.    valv heart disease  seizure disorder  copd  dm  asthma  OP  OA  bronchiectasis  tracheomalacia  HTN  DVT  AFIB  Colon Ca hx  Neuropathy     74yFemale pmhx of Epilepsy on Keppra, COPD,, DM2  asthma on chronic steroids, bronchiectasis, tracheomalacia s/p tracheloplasty HTN, Hx of dissection of descending thoracic aorta, , hx of aortic aneurysm,  Type B 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  Evaluated by Dr. Antonio,  Type A dissection s/p bioAVR with Bental procedure (9/2022), severe AS s/p TAVR (9/10/2023), TIA's, DVT Afib on Eliquis, T2DM, Colon cancer, neuropathy, presents to the ED for shortness of breath. Patient reports for the past 1-2 days having increased chest pressure out of her baseline, states she has also had cough, wheezing and shortness of breath with minimal relief after using her normal regimen of inhalers and nebulizers.    valv heart disease  seizure disorder  copd  dm  asthma  OP  OA  bronchiectasis  tracheomalacia  HTN  DVT  AFIB  Colon Ca hx  Neuropathy    ct abd reviewed - surgery eval noted  bowel rx regimen  nebs and bronchodilator rx regimen for mucoid impaction - copd - asthma  I nadiya  follows with Dr Allison - [facility]  monitor VS and HD and Sat  goal sat > 88 pct  oral hygiene  skin care  cvs rx regimen  BP control  AF management  tolerating RA  cx - biomarkers  assist with needs  on Home Dose of Systemic Steroids

## 2025-02-21 NOTE — PROGRESS NOTE ADULT - PROBLEM SELECTOR PLAN 1
Pt with reported fever and hypoxia at home associated with general weakness.  - Pt afebrile in ED, satting well on room air  - CT ANGIO PE, ABD, PELV IV   No parenchymal infiltration or consolidation.  No intra-abdominal abscess or evidence of enterocolitis.  Known type A aortic dissection.  Stable chronic findings as described.  - WBC 10.58, Lactate 1.3, Procalcitonin .08   - ProBNP 1896  - Pt has hx of PNA in past requiring abx, started on Ertapenem in ED, d/c by ID   - Suspect cough from chronic bronchiectasis and tracheomalacia   - F/u blood cx, urine cx, urine strep ag, legionella  - ID Dr Peng group Dr Soto saw pt Pt with reported fever and hypoxia at home associated with general weakness.  - Pt afebrile in ED, satting well on room air  - CT ANGIO PE, ABD, PELV IV Cont  No parenchymal infiltration or consolidation.  No intra-abdominal abscess or evidence of enterocolitis.  Known type A aortic dissection.  Stable chronic findings as described.  - WBC 10.58, Lactate 1.3, Procalcitonin .08   - ProBNP 1896  - Pt has hx of PNA in past requiring abx, started on Ertapenem in ED, d/c by ID   - Suspect cough from chronic bronchiectasis and tracheomalacia   - F/u blood cx, urine cx, urine strep ag, legionella  - ID Dr Peng group Dr Soto saw pt

## 2025-02-21 NOTE — DISCHARGE NOTE PROVIDER - NSDCMRMEDTOKEN_GEN_ALL_CORE_FT
ascorbic acid 500 mg oral tablet: 1 tab(s) orally once a day (in the morning)  Breo Ellipta 200 mcg-25 mcg/inh inhalation powder: 1 puff(s) inhaled once a day  budesonide 1 mg/2 mL inhalation suspension: 2 milliliter(s) by nebulizer 2 times a day  clopidogrel 75 mg oral tablet: 1 tab(s) orally once a day  Cranberry oral tablet: 1 tab(s) orally once a day (in the morning)  DuoNeb 0.5 mg-2.5 mg/3 mL inhalation solution: 3 milliliter(s) by nebulizer 4 times a day  Eliquis 5 mg oral tablet: 1 tab(s) orally 2 times a day  ferrous sulfate: 325 milligram(s) orally once a day  gabapentin 100 mg oral capsule: 200 milligram(s) orally 3 times a day  glycopyrrolate 1 mg oral tablet: 1 tab(s) orally 3 times a day  Incruse Ellipta 62.5 mcg/inh inhalation powder: 1 inhaler(s) inhaled once a day  Keppra 500 mg oral tablet: 2 tab(s) orally 2 times a day  labetalol 100 mg oral tablet: 1 tab(s) orally every 8 hours  lacosamide 100 mg oral tablet: 1 tab(s) orally 2 times a day  Lantus Solostar Pen 100 units/mL subcutaneous solution: 23 unit(s) subcutaneous once a day (in the morning)  Lantus Solostar Pen 100 units/mL subcutaneous solution: 28 unit(s) subcutaneous once a day (at bedtime)  megestrol 20 mg oral tablet: 1 tab(s) orally once a day as needed for appetite  methylPREDNISolone 8 mg oral tablet: 1 tab(s) orally once a day  mexiletine 200 mg oral capsule: 1 cap(s) orally 3 times a day  montelukast 10 mg oral tablet: 1 tab(s) orally once a day (at bedtime)  NIFEdipine 30 mg oral tablet, extended release: 1 tab(s) orally once a day  NovoLOG 100 units/mL injectable solution: Patient follows sliding scale instructions  Ohtuvayre 3 mg/ 2.5mL inhalation suspension: 3 milligram(s) by nebulizer 2 times a day  pantoprazole 40 mg oral delayed release tablet: 1 tab(s) orally once a day  Perforomist 20 mcg/2 mL inhalation solution: 2 milliliter(s) inhaled 2 times a day  rosuvastatin 5 mg oral tablet: 1 tab(s) orally once a day (at bedtime)  Senna 8.6 mg oral tablet: 2 tab(s) orally once a day  sertraline 50 mg oral tablet: 1 tab(s) orally once a day (in the morning) as needed for  anxiety  Sodium Chloride 7% Inhalation Solution: twice daily  Tezspire Pre-filled Pen 210 mg/1.91 mL subcutaneous solution: 210 milligram(s) subcutaneously once a month   ascorbic acid 500 mg oral tablet: 1 tab(s) orally once a day (in the morning)  Breo Ellipta 200 mcg-25 mcg/inh inhalation powder: 1 puff(s) inhaled once a day  budesonide 1 mg/2 mL inhalation suspension: 2 milliliter(s) by nebulizer 2 times a day  clopidogrel 75 mg oral tablet: 1 tab(s) orally once a day  Cranberry oral tablet: 1 tab(s) orally once a day (in the morning)  DuoNeb 0.5 mg-2.5 mg/3 mL inhalation solution: 3 milliliter(s) by nebulizer 4 times a day  Eliquis 5 mg oral tablet: 1 tab(s) orally 2 times a day  ferrous sulfate: 325 milligram(s) orally once a day  gabapentin 100 mg oral capsule: 200 milligram(s) orally 3 times a day  glycopyrrolate 1 mg oral tablet: 1 tab(s) orally 3 times a day  Incruse Ellipta 62.5 mcg/inh inhalation powder: 1 inhaler(s) inhaled once a day  Keppra 500 mg oral tablet: 2 tab(s) orally 2 times a day  labetalol 100 mg oral tablet: 1 tab(s) orally every 8 hours  lacosamide 100 mg oral tablet: 1 tab(s) orally 2 times a day  Lantus Solostar Pen 100 units/mL subcutaneous solution: 23 unit(s) subcutaneous once a day (in the morning)  Lantus Solostar Pen 100 units/mL subcutaneous solution: 28 unit(s) subcutaneous once a day (at bedtime)  megestrol 20 mg oral tablet: 1 tab(s) orally once a day as needed for appetite  methylPREDNISolone 8 mg oral tablet: 1 tab(s) orally once a day  mexiletine 200 mg oral capsule: 1 cap(s) orally 3 times a day  mineral oil oral liquid: 30 milliliter(s) orally once a day  montelukast 10 mg oral tablet: 1 tab(s) orally once a day (at bedtime)  NIFEdipine 30 mg oral tablet, extended release: 1 tab(s) orally once a day  NovoLOG 100 units/mL injectable solution: Patient follows sliding scale instructions  Ohtuvayre 3 mg/ 2.5mL inhalation suspension: 3 milligram(s) by nebulizer 2 times a day  pantoprazole 40 mg oral delayed release tablet: 1 tab(s) orally once a day  Perforomist 20 mcg/2 mL inhalation solution: 2 milliliter(s) inhaled 2 times a day  polyethylene glycol 3350 oral powder for reconstitution: 17 gram(s) orally once a day  rosuvastatin 5 mg oral tablet: 1 tab(s) orally once a day (at bedtime)  Senna 8.6 mg oral tablet: 2 tab(s) orally once a day  sertraline 50 mg oral tablet: 1 tab(s) orally once a day (in the morning) as needed for  anxiety  Sodium Chloride 7% Inhalation Solution: twice daily  Tezspire Pre-filled Pen 210 mg/1.91 mL subcutaneous solution: 210 milligram(s) subcutaneously once a month

## 2025-02-21 NOTE — PROGRESS NOTE ADULT - PROBLEM SELECTOR PLAN 6
Chronic BP soft   - Cont nifedipine and labetalol Chronic BP soft   - Cont nifedipine  daily and labetalol q 8 hrs

## 2025-02-21 NOTE — PROGRESS NOTE ADULT - PROBLEM SELECTOR PLAN 8
Chronic  - cont home Keppra bid  - seizure precautions. Chronic  - cont home Keppra 1000 mg  bid  -Lacosamide 100 mg 2x day  - seizure precautions.

## 2025-02-21 NOTE — DISCHARGE NOTE PROVIDER - NSDCFUSCHEDAPPT_GEN_ALL_CORE_FT
Eulalio Allison  [facility] Physician [facility]  PULMMED [address]  Scheduled Appointment: 02/25/2025    [facility]  PULED [address]  Scheduled Appointment: 02/25/2025    [place]  [facility]  INTMED  [facility] [address]  Scheduled Appointment: 02/26/2025    Emmy Horner  [facility]  NEUROLOGY [address]  Scheduled Appointment: 02/28/2025    Albert Aguilar  [facility]  OTOLARYNG [address] [address]  Scheduled Appointment: 03/19/2025    Afshan Stout  [facility]  INFDISEASE [address]  Scheduled Appointment: 03/25/2025    Rico Glover  [facility]  CARDIOLOGY [id number]  Scheduled Appointment: 04/30/2025

## 2025-02-21 NOTE — DISCHARGE NOTE NURSING/CASE MANAGEMENT/SOCIAL WORK - NSDCFUADDAPPT_GEN_ALL_CORE_FT
It is advisable to follow up with your primary care provider within the next 1 week. Your daughter stated you have follow up appointments Wednesday 2/27/25 and she will make further appointments.

## 2025-02-21 NOTE — DISCHARGE NOTE NURSING/CASE MANAGEMENT/SOCIAL WORK - NSDCVIVACCINE_GEN_ALL_CORE_FT
COVID-19, mRNA, LNP-S, PF, 100 mcg/ 0.5 mL dose (Moderna); 06-Dec-2021 17:12; Afshan Lew ([employer]); [employer] [id number] (Exp. Date: 22-Dec-2021); IntraMuscular; Deltoid Left.; 0.25 milliLiter(s);

## 2025-02-21 NOTE — PROGRESS NOTE ADULT - PROBLEM SELECTOR PLAN 10
On home gabapentin 200mg tid and [id number] qd, continue.
On home gabapentin 200mg tid and [id number] qd, continue.

## 2025-02-21 NOTE — DISCHARGE NOTE PROVIDER - NSDCCPCAREPLAN_GEN_ALL_CORE_FT
PRINCIPAL DISCHARGE DIAGNOSIS  Diagnosis: Fever and chills  Assessment and Plan of Treatment: You presented to the ER with a fever from home. Your initial workup for infectious agents was negative, however you were found to be in urinary retention. You were catheterized and your bladder was decompressed. It was found that you were significantly constipated. This impaction was likely the cause of your urinary retention. Urinary retention, when significant enough, can cause systemic reactions, including fevers. You received a bowel regimen and the impaction was relieved. Please continue taking your home bowel regimen to ensure passage of stool. Please follow up with your outpatient PCP for continued management.     PRINCIPAL DISCHARGE DIAGNOSIS  Diagnosis: Fever  Assessment and Plan of Treatment: You presented to the ER with a fever from home.  No fever in ER  CT C/A/P did not show Ac new findings to suggest infection  You got IV Abx in hospital, RVP-NEG  Blood c/s  NEG to date   -Your Home Doxy 100 mg q 12 hrs restarted to complete as recommended as out pt MD for  +sputum culture   -You were seen by ID in Hospital  - Your initial workup for infectioun  is  negative  -, you were found to be in urinary retention. You were catheterized and your bladder was decompressed.  Urinary retention, was likely 2/2 constipation . You received a bowel regimen and the constipation was relieved.   -Please continue taking your home bowel regimen to ensure passage of stool. Please follow up with your outpatient PCP for continued management.        SECONDARY DISCHARGE DIAGNOSES  Diagnosis: Chronic obstructive pulmonary disease (COPD)  Assessment and Plan of Treatment: Chronic -Not on home O2   CT Chest Angio done -No acute findings , Chronic changes +  -Continue all Home inhs as before  Breo-Allipta- inh  1 puff daily  Incruse -Elipta inh daily  2x day  Budesonide 2x day   methylprednisone 8 mg  po 2x day  Perforomist inh  Nacl inh 2x day  Ohtuvayre  3 ml neb 2x day  Duonebs  4x day  On Glycopyrrolate 1 mg  3x day    Diagnosis: Chronic atrial fibrillation  Assessment and Plan of Treatment: Continue eliquis 2x day   On mexelitine 200 mg 3x day  Nifedipine ER 30 mg 1 tab daily    Diagnosis: HTN (hypertension)  Assessment and Plan of Treatment: Continue Home meds   -Labetalol 100 mg 1 tab q 8 hrs   - Nifidipine  ER 30 mg 1    Diagnosis: Colostomy present  Assessment and Plan of Treatment: H/O Colon ca s/p sx   Colostomy in place  You were found to have significant  constipation   Seen by surgeon -Start Miralax once x day  Mineral oil daily   Senna 2 tab daily at bed time    Diagnosis: Epilepsy  Assessment and Plan of Treatment: On keppra  1000 mg 2x day  Locasomide 100 mg 1tab 2x day    Diagnosis: Type 2 diabetes mellitus  Assessment and Plan of Treatment: on Lantus 28 u SC daily in AM   Lantus 23 u SC daily in PM  Novolog sliding scale    Diagnosis: History of TIAs  Assessment and Plan of Treatment: On Plavix 75 mg daily    Diagnosis: Chronic GERD  Assessment and Plan of Treatment: on PPI daily    Diagnosis: Asthma  Assessment and Plan of Treatment: continue all Inh as before  on Singulair 10 mg  daily  Tezspire pen SC inj q 4 weeks    Diagnosis: Neuropathy  Assessment and Plan of Treatment: On gabapentin 200 mg 3x day    Diagnosis: Urinary retention  Assessment and Plan of Treatment: 2/2 Constipation -Resolved  Treat & Avoid Constipation       Diagnosis: Dyslipidemia  Assessment and Plan of Treatment: On statin daily

## 2025-02-21 NOTE — PROGRESS NOTE ADULT - PROBLEM SELECTOR PLAN 4
On ELIQUIS 5mg bid and Mexiletine 200 tid, will continue On ELIQUIS 5mg bid and Mexiletine 200 tid, will continue  Continue Nifedipine ER daily

## 2025-02-21 NOTE — PROGRESS NOTE ADULT - SUBJECTIVE AND OBJECTIVE BOX
Patient is a 74y old  Female who presents with a chief complaint of Fever (21 Feb 2025 07:35)    HPI:  74yFemale pmhx of Epilepsy on Keppra, COPD,, DM2  asthma on chronic steroids, bronchiectasis, tracheomalacia s/p tracheloplasty HTN, Hx of dissection of descending thoracic aorta, , hx of aortic aneurysm,  Type B 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  Evaluated by Dr. Antonio,  Type A dissection s/p bioAVR with Bental procedure (9/2022), severe AS s/p TAVR (9/10/2023), TIA's, DVT Afib on Eliquis, T2DM, Colon cancer S/P resection, Colostomy bag , neuropathy, presents to the ED for fever. Pt reports having past 2-3 days of cough associated with fever. Patient endorses increased weakness and fatigue. Patient has hx of recurrent PNA recently admitted for COPD exacerbation 1/8/25  Currently on Doxycycline prescribed by PCP    ED course  Vitals: T: 99.9F , HR: 60 , BP: 111/89 , RR: 20 , SpO2:  100% on RA  Significant labs: WBC 10.58, Hgb 11.8, Hct 37.8,  Na 139, K 3.8, BUN 15, Cr .77  Lactate 1.3, ProCalcitonin .08  Troponin 28.2  ProBNP 1896  Imaging:   CT ANGIO PE, ABD, PELV IV   No parenchymal infiltration or consolidation.  No intra-abdominal abscess or evidence of enterocolitis.  Known type A aortic dissection.  Stable chronic findings as described.    In ED patient given: OFIRMEV, ertapenem, benadryl, 1L NS Bolus     (20 Feb 2025 04:55)    INTERVAL HPI:  2/20/25- Patient admitted today. Pt was seen and examined at bedside. Pt was hypoglycemic this morning, given juice and crackers with improvement to blood sugar. Pt states that she has lower abdominal pain and has not been able to urinate since her arrival. Denies headache, dizziness, lightheadedness, fever, chills, body aches, CP, SOB, palpitations, n/v, numbness/tingling. No other complaints at this time.   2/21/25- Pt was seen and examined at bedside. Pt states that she feels well today. Productive cough persists, however remains afebrile, no SOB.     OVERNIGHT EVENTS: No acute overnight events.     Home Medications:  Albuterol (Eqv-ProAir HFA) 90 mcg/inh inhalation aerosol: 2 puff(s) inhaled every 6 hours as needed for  shortness of breath and/or wheezing (20 Feb 2025 05:26)  Basaglar KwikPen 100 units/mL subcutaneous solution: 30 unit(s) subcutaneous once a day (20 Feb 2025 05:35)  Basaglar KwikPen 100 units/mL subcutaneous solution: 18 unit(s) subcutaneous once a day (at bedtime) (20 Feb 2025 05:35)  Breo Ellipta 200 mcg-25 mcg/inh inhalation powder: 1 puff(s) inhaled once a day (20 Feb 2025 05:33)  budesonide 1 mg/2 mL inhalation suspension: 2 milliliter(s) by nebulizer once a day (20 Feb 2025 05:33)  clopidogrel 75 mg oral tablet: 1 tab(s) orally once a day (20 Feb 2025 05:33)  DuoNeb 0.5 mg-2.5 mg/3 mL inhalation solution: 3 milliliter(s) by nebulizer once a day (20 Feb 2025 05:33)  Eliquis 5 mg oral tablet: 1 tab(s) orally 2 times a day (20 Feb 2025 05:28)  ferrous sulfate: 325 milligram(s) orally once a day (20 Feb 2025 05:33)  gabapentin 100 mg oral capsule: 200 milligram(s) orally 3 times a day (20 Feb 2025 05:33)  glycopyrrolate 1 mg oral tablet: 1 tab(s) orally 3 times a day (20 Feb 2025 05:33)  Keppra 500 mg oral tablet: 2 tab(s) orally 2 times a day (20 Feb 2025 05:33)  labetalol 100 mg oral tablet: 1 tab(s) orally every 8 hours (20 Feb 2025 05:33)  megestrol 20 mg oral tablet: 1 tab(s) orally once a day (20 Feb 2025 05:35)  methylPREDNISolone 8 mg oral tablet: 1 tab(s) orally 2 times a day (20 Feb 2025 05:33)  mexiletine 200 mg oral capsule: 1 cap(s) orally 3 times a day (20 Feb 2025 05:33)  montelukast 10 mg oral tablet: 1 tab(s) orally once a day (20 Feb 2025 05:33)  NIFEdipine 30 mg oral tablet, extended release: 1 tab(s) orally once a day (20 Feb 2025 05:33)  pantoprazole 40 mg oral delayed release tablet: 1 tab(s) orally once a day (20 Feb 2025 05:33)  Perforomist 20 mcg/2 mL inhalation solution: 2 milliliter(s) inhaled once a day (20 Feb 2025 05:33)  rosuvastatin 5 mg oral tablet: 1 tab(s) orally once a day (at bedtime) (20 Feb 2025 05:33)  sertraline 50 mg oral tablet: 1 tab(s) orally once a day (20 Feb 2025 05:33)  Te[place]spire Pre-filled Pen 210 mg/1.91 mL subcutaneous solution: 210 milligram(s) subcutaneously (20 Feb 2025 05:33)      MEDICATIONS  (STANDING):  albuterol/ipratropium for Nebulization 3 milliLiter(s) Nebulizer every 6 hours  apixaban 5 milliGRAM(s) Oral every 12 hours  buDESOnide    Inhalation Suspension 0.5 milliGRAM(s) Inhalation daily  clopidogrel Tablet 75 milliGRAM(s) Oral daily  dextrose 5%. 1000 milliLiter(s) (100 mL/Hr) IV Continuous <Continuous>  dextrose 5%. 1000 milliLiter(s) (50 mL/Hr) IV Continuous <Continuous>  dextrose 50% Injectable 25 Gram(s) IV Push once  dextrose 50% Injectable 12.5 Gram(s) IV Push once  dextrose 50% Injectable 25 Gram(s) IV Push once  ferrous    sulfate 325 milliGRAM(s) Oral daily  gabapentin 200 milliGRAM(s) Oral three times a day  glucagon  Injectable 1 milliGRAM(s) IntraMuscular once  glycopyrrolate 1 milliGRAM(s) Oral three times a day  insulin glargine Injectable (LANTUS) 25 Unit(s) SubCutaneous at bedtime  insulin lispro (ADMELOG) corrective regimen sliding scale   SubCutaneous three times a day before meals  labetalol 100 milliGRAM(s) Oral every 8 hours  levETIRAcetam 1000 milliGRAM(s) Oral two times a day  megestrol 20 milliGRAM(s) Oral daily  methylPREDNISolone 8 milliGRAM(s) Oral two times a day  mexiletine 200 milliGRAM(s) Oral three times a day  mineral oil 30 milliLiter(s) Oral daily  montelukast 10 milliGRAM(s) Oral daily  NIFEdipine XL 30 milliGRAM(s) Oral daily  pantoprazole    Tablet 40 milliGRAM(s) Oral before breakfast  polyethylene glycol 3350 17 Gram(s) Oral daily  rosuvastatin 5 milliGRAM(s) Oral at bedtime  senna 2 Tablet(s) Oral at bedtime  sertraline 50 milliGRAM(s) Oral daily    MEDICATIONS  (PRN):  albuterol    90 MICROgram(s) HFA Inhaler 2 Puff(s) Inhalation every 6 hours PRN for shortness of breath and/or wheezing  dextrose Oral Gel 15 Gram(s) Oral once PRN Blood Glucose LESS THAN 70 milliGRAM(s)/deciliter      aspirin (Short breath)  cefepime (Anaphylaxis)  meropenem (Unknown)  Percocet (Unknown)  Avelox (Unknown)  shellfish (Anaphylaxis)  aspirin (Unknown)  OxyContin (Unknown)  penicillin (Unknown)  codeine (Unknown)  Valium (Unknown)  iodine (Short breath; Swelling)  tetanus toxoid (Short breath)  Valium (Short breath)  penicillin (Anaphylaxis)  Dilaudid (Short breath)  codeine (Short breath)  Avelox (Short breath; Pruritus)  ampicillin (Unknown)  cefepime (Short breath (Severe))      Social History:  Lives at home with daughter, requires assistance to ambulate with walker  No alcohol use, never smoker  Not working (20 Feb 2025 04:55)      REVIEW OF SYSTEMS:  CONSTITUTIONAL: No fever, No chills, No fatigue, No myalgia, No Body ache, No Weakness  EYES: No eye pain,  No visual disturbances, No discharge, No Redness  ENMT: No ear pain, No nose bleed, No vertigo; No sinus pain, No throat pain, No Congestion  NECK: No pain, No stiffness  RESPIRATORY: +cough, No wheezing, No hemoptysis, No shortness of breath  CARDIOVASCULAR: No chest pain, No palpitations  GASTROINTESTINAL: No abdominal pain, No epigastric pain. No nausea, No vomiting, No diarrhea, No constipation; [ x ] BM-  GENITOURINARY: No dysuria, No frequency, No urgency, No hematuria, No incontinence  NEUROLOGICAL: No headaches, No dizziness, No numbness, No tingling, No tremors, No weakness  EXTREMITIES: No Swelling, No Pain, No Edema  SKIN: [ x ] No itching, burning, rashes, or lesions   MUSCULOSKELETAL: No joint pain, No joint swelling; No muscle pain, No back pain, No extremity pain  PSYCHIATRIC: No depression, No anxiety, No mood swings, No difficulty sleeping at night  PAIN SCALE: [  ] None  [  ] Other-  ROS Unable to obtain due to: [  ] Dementia  [  ] Lethargy  [  ] Sedated  [  ] Non verbal  REST OF REVIEW OF SYSTEMS: [ x ] Normal     Vital Signs Last 24 Hrs  T(C): 36.8 (21 Feb 2025 11:25), Max: 36.9 (21 Feb 2025 04:48)  T(F): 98.3 (21 Feb 2025 11:25), Max: 98.4 (21 Feb 2025 04:48)  HR: 80 (21 Feb 2025 11:25) (58 - 83)  BP: 126/70 (21 Feb 2025 11:25) (126/70 - 135/58)  BP(mean): --  RR: 18 (21 Feb 2025 11:25) (18 - 18)  SpO2: 90% (21 Feb 2025 11:25) (90% - 96%)    Parameters below as of 21 Feb 2025 11:25  Patient On (Oxygen Delivery Method): room air        CAPILLARY BLOOD GLUCOSE      POCT Blood Glucose.: 253 mg/dL (21 Feb 2025 12:01)  POCT Blood Glucose.: 146 mg/dL (21 Feb 2025 08:10)  POCT Blood Glucose.: 180 mg/dL (20 Feb 2025 22:00)  POCT Blood Glucose.: 150 mg/dL (20 Feb 2025 16:55)      I&O's Summary    20 Feb 2025 07:01  -  21 Feb 2025 07:00  --------------------------------------------------------  IN: 0 mL / OUT: 1350 mL / NET: -1350 mL      PHYSICAL EXAM:  GENERAL:  [ x ] NAD, [ x ] Well appearing, [  ] Agitated, [  ] Drowsy, [  ] Lethargy, [  ] Confused   HEAD:  [ x ] Normal, [  ] Other  EYES:  [ x ] EOMI, [ x ] PERRLA, [ x ] Conjunctiva and sclera clear normal, [  ] Other, [  ] Pallor, [  ] Discharge  ENMT:  [ x ] Normal, [ x ] Moist mucous membranes, [  ] Good dentition, [  ] No thrush  NECK:  [ x ] Supple, [  ] No JVD, [ x ] Normal thyroid, [  ] Lymphadenopathy, [  ] Other  CHEST/LUNG:  [ x ] Clear to auscultation bilaterally, [ x ] Breath Sounds  decreased, [  ] Poor effort, [ x ] No rales, [ x ] coarse, rhonchorous breath sounds bilaterally, productive cough, [ x ] No wheezing  HEART:  [ x ] Regular rate and rhythm, [  ] Tachycardia, [  ] Bradycardia, [  ] Irregular, [ x ] No murmurs, No rubs, No gallops, [  ] PPM in place (Mfr:  )  ABDOMEN:  [ x ] Soft, [ x ] Nondistended, [ x ] No mass, [ x ] Bowel sounds present, [  ] Obese  NERVOUS SYSTEM:  [ x ] Alert & Oriented x3__, [ x ] Nonfocal, [  ] Confusion, [  ] Encephalopathic, [  ] Sedated, [  ] Unable to assess, [  ] Dementia, [  ] Other-  EXTREMITIES:  [ x ] 2+ Peripheral Pulses, No clubbing, No cyanosis,  [  ] Edema B/L lower EXT, [  ] PVD stasis skin changes B/L lower EXT, [  ] Wound  LYMPH:  No lymphadenopathy noted  SKIN:  [ x ] No rashes or lesions, [  ] Pressure ulcers, [  ] Ecchymosis, [  ] Skin tears, [  ] Other    DIET: Diet, DASH/TLC:   Sodium & Cholesterol Restricted  Consistent Carbohydrate Evening Snack (02-20-25 @ 13:23)      LABS:                        11.3   7.34  )-----------( 231      ( 21 Feb 2025 08:41 )             35.8     21 Feb 2025 08:41    142    |  110    |  13     ----------------------------<  125    4.4     |  26     |  0.65     Ca    9.0        21 Feb 2025 08:41    TPro  6.4    /  Alb  2.8    /  TBili  0.3    /  DBili  x      /  AST  14     /  ALT  14     /  AlkPhos  62     21 Feb 2025 08:41    PT/INR - ( 20 Feb 2025 01:50 )   PT: 17.2 sec;   INR: 1.46 ratio         PTT - ( 20 Feb 2025 01:50 )  PTT:34.7 sec  Urinalysis Basic - ( 21 Feb 2025 08:41 )    Color: x / Appearance: x / SG: x / pH: x  Gluc: 125 mg/dL / Ketone: x  / Bili: x / Urobili: x   Blood: x / Protein: x / Nitrite: x   Leuk Esterase: x / RBC: x / WBC x   Sq Epi: x / Non Sq Epi: x / Bacteria: x      Culture Results:   No growth at 24 hours (02-20 @ 02:00)  Culture Results:   No growth at 24 hours (02-20 @ 01:50)    culture blood  -- .Blood Blood-Peripheral 02-20 @ 02:00    culture urine  --  02-20 @ 02:00  culture blood  -- .Blood Blood-Peripheral 02-20 @ 01:50    culture urine  --  02-20 @ 01:50          Urinalysis with Rflx Culture (collected 20 Feb 2025 22:06)    Culture - Blood (collected 20 Feb 2025 02:00)  Source: .Blood Blood-Peripheral  Preliminary Report (21 Feb 2025 09:02):    No growth at 24 hours    Culture - Blood (collected 20 Feb 2025 01:50)  Source: .Blood Blood-Peripheral  Preliminary Report (21 Feb 2025 09:02):    No growth at 24 hours       Anemia Panel:      Thyroid Panel:                RADIOLOGY & ADDITIONAL TESTS: _______      HEALTH ISSUES - PROBLEM Dx:  Urinary retention    Chronic obstructive pulmonary disease (COPD)    Chronic atrial fibrillation    Asthma    HTN (hypertension)    History of TIAs    Epilepsy    Type 2 diabetes mellitus    Neuropathy    Need for prophylactic measure    COPD with hypoxia          Consultant(s) Notes Reviewed:  [ x ] YES     Care Discussed with [ x ] Consultants, [ x ] Patient, [ x ] Family, [  ] HCP, [ x ] , [  ] Social Service, [ x ] RN, [  ] Physical Therapy, [  ] Palliative Care Team  DVT PPX: [  ] Lovenox, [  ] SC Heparin, [  ] Coumadin, [  ] Xarelto, [  ] Eliquis, [  ] Pradaxa, [  ] IV Heparin drip, [  ] SCD, [  ] Ambulation, [  ] Contraindicated 2/2 GI Bleed, [  ] Contraindicated 2/2  Bleed, [  ] Contraindicated 2/2 Brain Bleed  Advanced Directive: [  ] None, [  ] DNR/DNI Patient is a 74y old  Female who presents with a chief complaint of Fever (21 Feb 2025 07:35)    HPI:  74yFemale pmhx of Epilepsy on Keppra, COPD,, DM2  asthma on chronic steroids, bronchiectasis, tracheomalacia s/p tracheloplasty HTN, Hx of dissection of descending thoracic aorta, , hx of aortic aneurysm,  Type B 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  Evaluated by Dr. Antonio,  Type A dissection s/p bioAVR with Bental procedure (9/2022), severe AS s/p TAVR (9/10/2023), TIA's, DVT Afib on Eliquis, T2DM, Colon cancer S/P resection, Colostomy bag , neuropathy, presents to the ED for fever. Pt reports having past 2-3 days of cough associated with fever. Patient endorses increased weakness and fatigue. Patient has hx of recurrent PNA recently admitted for COPD exacerbation 1/8/25  Currently on Doxycycline prescribed by PCP    ED course  Vitals: T: 99.9F , HR: 60 , BP: 111/89 , RR: 20 , SpO2:  100% on RA  Significant labs: WBC 10.58, Hgb 11.8, Hct 37.8,  Na 139, K 3.8, BUN 15, Cr .77  Lactate 1.3, ProCalcitonin .08  Troponin 28.2  ProBNP 1896  Imaging:   CT ANGIO PE, ABD, PELV IV   No parenchymal infiltration or consolidation.  No intra-abdominal abscess or evidence of enterocolitis.  Known type A aortic dissection.  Stable chronic findings as described.    In ED patient given: OFIRMEV, ertapenem, benadryl, 1L NS Bolus     (20 Feb 2025 04:55)    INTERVAL HPI:  2/20/25- Patient admitted today. Pt was seen and examined at bedside. Pt was hypoglycemic this morning, given juice and crackers with improvement to blood sugar. Pt states that she has lower abdominal pain and has not been able to urinate since her arrival. Denies headache, dizziness, lightheadedness, fever, chills, body aches, CP, SOB, palpitations, n/v, numbness/tingling. No other complaints at this time.   2/21/25- Pt was seen and examined at bedside. Pt states that she feels well today. Productive cough persists, however remains afebrile, no SOB. + BM's with good Colostomy out put ,OOB to chair    OVERNIGHT EVENTS: No acute overnight events.     Home Medications:  Albuterol (Eqv-ProAir HFA) 90 mcg/inh inhalation aerosol: 2 puff(s) inhaled every 6 hours as needed for  shortness of breath and/or wheezing (20 Feb 2025 05:26)  Basaglar KwikPen 100 units/mL subcutaneous solution: 30 unit(s) subcutaneous once a day (20 Feb 2025 05:35)  Basaglar KwikPen 100 units/mL subcutaneous solution: 18 unit(s) subcutaneous once a day (at bedtime) (20 Feb 2025 05:35)  Breo Ellipta 200 mcg-25 mcg/inh inhalation powder: 1 puff(s) inhaled once a day (20 Feb 2025 05:33)  budesonide 1 mg/2 mL inhalation suspension: 2 milliliter(s) by nebulizer once a day (20 Feb 2025 05:33)  clopidogrel 75 mg oral tablet: 1 tab(s) orally once a day (20 Feb 2025 05:33)  DuoNeb 0.5 mg-2.5 mg/3 mL inhalation solution: 3 milliliter(s) by nebulizer once a day (20 Feb 2025 05:33)  Eliquis 5 mg oral tablet: 1 tab(s) orally 2 times a day (20 Feb 2025 05:28)  ferrous sulfate: 325 milligram(s) orally once a day (20 Feb 2025 05:33)  gabapentin 100 mg oral capsule: 200 milligram(s) orally 3 times a day (20 Feb 2025 05:33)  glycopyrrolate 1 mg oral tablet: 1 tab(s) orally 3 times a day (20 Feb 2025 05:33)  Keppra 500 mg oral tablet: 2 tab(s) orally 2 times a day (20 Feb 2025 05:33)  labetalol 100 mg oral tablet: 1 tab(s) orally every 8 hours (20 Feb 2025 05:33)  megestrol 20 mg oral tablet: 1 tab(s) orally once a day (20 Feb 2025 05:35)  methylPREDNISolone 8 mg oral tablet: 1 tab(s) orally 2 times a day (20 Feb 2025 05:33)  mexiletine 200 mg oral capsule: 1 cap(s) orally 3 times a day (20 Feb 2025 05:33)  montelukast 10 mg oral tablet: 1 tab(s) orally once a day (20 Feb 2025 05:33)  NIFEdipine 30 mg oral tablet, extended release: 1 tab(s) orally once a day (20 Feb 2025 05:33)  pantoprazole 40 mg oral delayed release tablet: 1 tab(s) orally once a day (20 Feb 2025 05:33)  Perforomist 20 mcg/2 mL inhalation solution: 2 milliliter(s) inhaled once a day (20 Feb 2025 05:33)  rosuvastatin 5 mg oral tablet: 1 tab(s) orally once a day (at bedtime) (20 Feb 2025 05:33)  sertraline 50 mg oral tablet: 1 tab(s) orally once a day (20 Feb 2025 05:33)  Te[place]sp[place] Pre-filled Pen 210 mg/1.91 mL subcutaneous solution: 210 milligram(s) subcutaneously (20 Feb 2025 05:33)      MEDICATIONS  (STANDING):  albuterol/ipratropium for Nebulization 3 milliLiter(s) Nebulizer every 6 hours  apixaban 5 milliGRAM(s) Oral every 12 hours  buDESOnide    Inhalation Suspension 0.5 milliGRAM(s) Inhalation daily  clopidogrel Tablet 75 milliGRAM(s) Oral daily  dextrose 5%. 1000 milliLiter(s) (100 mL/Hr) IV Continuous <Continuous>  dextrose 5%. 1000 milliLiter(s) (50 mL/Hr) IV Continuous <Continuous>  dextrose 50% Injectable 25 Gram(s) IV Push once  dextrose 50% Injectable 12.5 Gram(s) IV Push once  dextrose 50% Injectable 25 Gram(s) IV Push once  ferrous    sulfate 325 milliGRAM(s) Oral daily  gabapentin 200 milliGRAM(s) Oral three times a day  glucagon  Injectable 1 milliGRAM(s) IntraMuscular once  glycopyrrolate 1 milliGRAM(s) Oral three times a day  insulin glargine Injectable (LANTUS) 25 Unit(s) SubCutaneous at bedtime  insulin lispro (ADMELOG) corrective regimen sliding scale   SubCutaneous three times a day before meals  labetalol 100 milliGRAM(s) Oral every 8 hours  levETIRAcetam 1000 milliGRAM(s) Oral two times a day  megestrol 20 milliGRAM(s) Oral daily  methylPREDNISolone 8 milliGRAM(s) Oral two times a day  mexiletine 200 milliGRAM(s) Oral three times a day  mineral oil 30 milliLiter(s) Oral daily  montelukast 10 milliGRAM(s) Oral daily  NIFEdipine XL 30 milliGRAM(s) Oral daily  pantoprazole    Tablet 40 milliGRAM(s) Oral before breakfast  polyethylene glycol 3350 17 Gram(s) Oral daily  rosuvastatin 5 milliGRAM(s) Oral at bedtime  senna 2 Tablet(s) Oral at bedtime  sertraline 50 milliGRAM(s) Oral daily    MEDICATIONS  (PRN):  albuterol    90 MICROgram(s) HFA Inhaler 2 Puff(s) Inhalation every 6 hours PRN for shortness of breath and/or wheezing  dextrose Oral Gel 15 Gram(s) Oral once PRN Blood Glucose LESS THAN 70 milliGRAM(s)/deciliter      aspirin (Short breath)  cefepime (Anaphylaxis)  meropenem (Unknown)  Percocet (Unknown)  Avelox (Unknown)  shellfish (Anaphylaxis)  aspirin (Unknown)  OxyContin (Unknown)  penicillin (Unknown)  codeine (Unknown)  Valium (Unknown)  iodine (Short breath; Swelling)  tetanus toxoid (Short breath)  Valium (Short breath)  penicillin (Anaphylaxis)  Dilaudid (Short breath)  codeine (Short breath)  Avelox (Short breath; Pruritus)  ampicillin (Unknown)  cefepime (Short breath (Severe))      Social History:  Lives at home with daughter, requires assistance to ambulate with walker  No alcohol use, never smoker  Not working (20 Feb 2025 04:55)      REVIEW OF SYSTEMS: i am Ok, No abdominal pain   CONSTITUTIONAL: No fever, No chills, No fatigue, No myalgia, No Body ache, No Weakness  EYES: No eye pain,  No visual disturbances, No discharge, No Redness  ENMT: No ear pain, No nose bleed, No vertigo; No sinus pain, No throat pain, No Congestion  NECK: No pain, No stiffness  RESPIRATORY: +cough, No wheezing, No hemoptysis, No shortness of breath  CARDIOVASCULAR: No chest pain, No palpitations  GASTROINTESTINAL: No abdominal pain, No epigastric pain. No nausea, No vomiting, No diarrhea, No constipation; [ x ] BM-colostomy Bag   GENITOURINARY: No dysuria, No frequency, No urgency, No hematuria, No incontinence  NEUROLOGICAL: No headaches, No dizziness, No numbness, No tingling, No tremors, No weakness  EXTREMITIES: No Swelling, No Pain, No Edema  SKIN: [ x ] No itching, burning, rashes, or lesions   MUSCULOSKELETAL: No joint pain, No joint swelling; No muscle pain, No back pain, No extremity pain  PSYCHIATRIC: No depression, No anxiety, No mood swings, No difficulty sleeping at night  PAIN SCALE: [x  ] None  [  ] Other-  ROS Unable to obtain due to: [  ] Dementia  [  ] Lethargy  [  ] Sedated  [  ] Non verbal  REST OF REVIEW OF SYSTEMS: [ x ] Normal     Vital Signs Last 24 Hrs  T(C): 36.8 (21 Feb 2025 11:25), Max: 36.9 (21 Feb 2025 04:48)  T(F): 98.3 (21 Feb 2025 11:25), Max: 98.4 (21 Feb 2025 04:48)  HR: 80 (21 Feb 2025 11:25) (58 - 83)  BP: 126/70 (21 Feb 2025 11:25) (126/70 - 135/58)  BP(mean): --  RR: 18 (21 Feb 2025 11:25) (18 - 18)  SpO2: 90% (21 Feb 2025 11:25) (90% - 96%)    Parameters below as of 21 Feb 2025 11:25  Patient On (Oxygen Delivery Method): room air        CAPILLARY BLOOD GLUCOSE      POCT Blood Glucose.: 253 mg/dL (21 Feb 2025 12:01)  POCT Blood Glucose.: 146 mg/dL (21 Feb 2025 08:10)  POCT Blood Glucose.: 180 mg/dL (20 Feb 2025 22:00)  POCT Blood Glucose.: 150 mg/dL (20 Feb 2025 16:55)      I&O's Summary    20 Feb 2025 07:01  -  21 Feb 2025 07:00  --------------------------------------------------------  IN: 0 mL / OUT: 1350 mL / NET: -1350 mL      PHYSICAL EXAM:  GENERAL:  [ x ] NAD, [ x ] Well appearing, [  ] Agitated, [  ] Drowsy, [  ] Lethargy, [  ] Confused   HEAD:  [ x ] Normal, [  ] Other  EYES:  [ x ] EOMI, [ x ] PERRLA, [ x ] Conjunctiva and sclera clear normal, [  ] Other, [  ] Pallor, [  ] Discharge  ENMT:  [ x ] Normal, [ x ] Moist mucous membranes, [  ] Good dentition, [  ] No thrush  NECK:  [ x ] Supple, [  ] No JVD, [ x ] Normal thyroid, [  ] Lymphadenopathy, [  ] Other  CHEST/LUNG:  [ x ] Clear to auscultation bilaterally, [ x ] Breath Sounds  decreased, [  ] Poor effort, [ x ] No rales, [ x ] coarse, Rhonchi  breath sounds bilaterally,  [ x ] No wheezing  HEART:  [ x ] Regular rate and rhythm, [  ] Tachycardia, [  ] Bradycardia, [  ] Irregular, [ x ] No murmurs, No rubs, No gallops, [  ] PPM in place (Mfr:  )  ABDOMEN:  [ x ] Soft, [ x ] Nondistended, [ x ] No mass, [ x ] Bowel sounds present, [  ] Obese  NERVOUS SYSTEM:  [ x ] Alert & Oriented x3__, [ x ] Nonfocal, [  ] Confusion, [  ] Encephalopathic, [  ] Sedated, [  ] Unable to assess, [  ] Dementia, [  ] Other-  EXTREMITIES:  [ x ] 2+ Peripheral Pulses, No clubbing, No cyanosis,  [  ] Edema B/L lower EXT, [ x ] PVD stasis skin changes B/L lower EXT, [  ] Wound  LYMPH:  No lymphadenopathy noted  SKIN:  [ x ] No rashes or lesions, [  ] Pressure ulcers, [  ] Ecchymosis, [  ] Skin tears, [  ] Other    DIET: Diet, DASH/TLC:   Sodium & Cholesterol Restricted  Consistent Carbohydrate Evening Snack (02-20-25 @ 13:23)      LABS:                        11.3   7.34  )-----------( 231      ( 21 Feb 2025 08:41 )             35.8     21 Feb 2025 08:41    142    |  110    |  13     ----------------------------<  125    4.4     |  26     |  0.65     Ca    9.0        21 Feb 2025 08:41    TPro  6.4    /  Alb  2.8    /  TBili  0.3    /  DBili  x      /  AST  14     /  ALT  14     /  AlkPhos  62     21 Feb 2025 08:41    PT/INR - ( 20 Feb 2025 01:50 )   PT: 17.2 sec;   INR: 1.46 ratio         PTT - ( 20 Feb 2025 01:50 )  PTT:34.7 sec  Urinalysis Basic - ( 21 Feb 2025 08:41 )    Color: x / Appearance: x / SG: x / pH: x  Gluc: 125 mg/dL / Ketone: x  / Bili: x / Urobili: x   Blood: x / Protein: x / Nitrite: x   Leuk Esterase: x / RBC: x / WBC x   Sq Epi: x / Non Sq Epi: x / Bacteria: x      Culture Results:   No growth at 24 hours (02-20 @ 02:00)  Culture Results:   No growth at 24 hours (02-20 @ 01:50)    culture blood  -- .Blood Blood-Peripheral 02-20 @ 02:00    culture urine  --  02-20 @ 02:00  culture blood  -- .Blood Blood-Peripheral 02-20 @ 01:50    culture urine  --  02-20 @ 01:50      Urinalysis with Rflx Culture (collected 20 Feb 2025 22:06)    Culture - Blood (collected 20 Feb 2025 02:00)  Source: .Blood Blood-Peripheral  Preliminary Report (21 Feb 2025 09:02):    No growth at 24 hours    Culture - Blood (collected 20 Feb 2025 01:50)  Source: .Blood Blood-Peripheral  Preliminary Report (21 Feb 2025 09:02):    No growth at 24 hours       RADIOLOGY & ADDITIONAL TESTS: _______  < from: CT Abdomen and Pelvis No Cont (02.20.25 @ 20:08) >    IMPRESSION:    Limited noncontrast exam.    Colonic distention with fecal retention; no small bowel obstruction.    Other findings as discussed above.    < end of copied text >      HEALTH ISSUES - PROBLEM Dx:  Urinary retention    Chronic obstructive pulmonary disease (COPD)    Chronic atrial fibrillation    Asthma    HTN (hypertension)    History of TIAs    Epilepsy    Type 2 diabetes mellitus    Neuropathy    Need for prophylactic measure    COPD with hypoxia          Consultant(s) Notes Reviewed:  [ x ] YES     Care Discussed with [ x ] Consultants, [ x ] Patient, [ x ] Family, [  ] HCP, [ x ] , [  ] Social Service, [ x ] RN, [  ] Physical Therapy, [  ] Palliative Care Team  DVT PPX: [  ] Lovenox, [  ] SC Heparin, [  ] Coumadin, [  ] Xarelto, [x  ] Eliquis, [  ] Pradaxa, [  ] IV Heparin drip, [  ] SCD, [  ] Ambulation, [  ] Contraindicated 2/2 GI Bleed, [  ] Contraindicated 2/2  Bleed, [  ] Contraindicated 2/2 Brain Bleed  Advanced Directive: [ x ] None, [  ] DNR/DNI

## 2025-02-21 NOTE — DISCHARGE NOTE PROVIDER - CARE PROVIDER_API CALL
[place]  Internal Medicine  [address] [address]  Phone: [phone]  Fax: [phone]  Follow Up Time:

## 2025-02-21 NOTE — PROGRESS NOTE ADULT - PROBLEM SELECTOR PLAN 5
Chronic on inhalers and prednisone  - Cont home inhalers and steroids  - Cont home methyl prednisone 2x day  - Breo interchange will start Advair  -Cont Duoneb prn wheezing.  - Pulm Dr Luis d/w -No concern for PNA, All chronic changes on CT

## 2025-02-21 NOTE — DISCHARGE NOTE NURSING/CASE MANAGEMENT/SOCIAL WORK - PATIENT PORTAL LINK FT
You can access the FollowMyHealth Patient Portal offered by [facility] by registering at the following website: [email or web address]. By joining [facility]’s FollowMyHealth portal, you will also be able to view your health information using other applications (apps) compatible with our system.

## 2025-02-21 NOTE — CASE MANAGEMENT PROGRESS NOTE - NSCMPROGRESSNOTE_GEN_ALL_CORE
Per MD pt is medically cleared for discharge today 2/21/25. CM met with patient / daughter Zoe to discuss transition of care.  Pt is to be transitioned to home with [facility] [phone] pending acceptance. CM explained home care services, expectations, process, insurance provisions and home safety with pt verbalizing understanding.  Pt daughter states pt has NHTD waiver program, pt daughter, who is CDPAP, stated she will assume care of patient as she wishes to take her home this evening. CM faxed appropriate MELISSA forms to Peg at [phone] and [phone]. CM left message for Peg and Pilar at [facility] to inform them of pt discharge. Per pt daughter Zoe she spoke with Pilar and they received fax. CM was unable to confirm this information currently, however daughter would like to take pt home this evening,  Pt daughter aware of plan and in agreement / verbalizes understanding. IMM discussed / given. Pt verbalized understanding. [facility]. community MD is Dr. Myers. Pt daughter stated they would make pt follow up appointments and she has an appointment with PCP Wednesday 2/26/25. Per daughter DMEs in home include walker and wheelchair.  Pt daughter stated she will transport pt home. All questions answered. CM discussed plan with MD and RN. CM to remain available through hospitalization.

## 2025-02-21 NOTE — PROGRESS NOTE ADULT - SUBJECTIVE AND OBJECTIVE BOX
SUBJECTIVE:  Patient seen and examined at bedside.  No overnight events.  Patient reports no new complaints at this time, continued suprapubic/lower abdominal discomfort. Ostomy with gas, no stool. Tolerating diet. Reports poor hydration.  Patient denies any fever, chills, chest pain, shortness of breath, nausea, vomiting, or urinary complaints.    VITALS  Vital Signs Last 24 Hrs  T(C): 36.9 (21 Feb 2025 04:48), Max: 36.9 (20 Feb 2025 09:16)  T(F): 98.4 (21 Feb 2025 04:48), Max: 98.4 (20 Feb 2025 09:16)  HR: 60 (21 Feb 2025 04:48) (59 - 83)  BP: 135/58 (21 Feb 2025 04:48) (116/47 - 135/58)  BP(mean): --  RR: 18 (21 Feb 2025 04:48) (18 - 23)  SpO2: 96% (21 Feb 2025 04:48) (93% - 96%)    Parameters below as of 21 Feb 2025 04:48  Patient On (Oxygen Delivery Method): room air        PHYSICAL EXAM  GENERAL:  Well-nourished, well-developed Female lying comfortably in bed in NAD.  HEENT:  NC/AT. Sclera white.  CARDIO:  Regular rate and rhythm  RESPIRATORY:  Nonlabored breathing, no accessory muscle use.  ABDOMEN:  Soft, nondistended, tender to palpation of lower abdomen without guarding or rebound. Ostomy in LLQ with gas, no stool. Pink, patent.  EXTREMITIES: No calf tenderness bilaterally.  SKIN:  No jaundice, pallor, or cyanosis  NEURO:  A&O x 3    INTAKE & OUTPUT  I&O's Summary    20 Feb 2025 07:01  -  21 Feb 2025 07:00  --------------------------------------------------------  IN: 0 mL / OUT: 1350 mL / NET: -1350 mL      I&O's Detail    20 Feb 2025 07:01  -  21 Feb 2025 07:00  --------------------------------------------------------  IN:  Total IN: 0 mL    OUT:    Voided (mL): 1350 mL  Total OUT: 1350 mL    Total NET: -1350 mL          MEDICATIONS  MEDICATIONS  (STANDING):  albuterol/ipratropium for Nebulization 3 milliLiter(s) Nebulizer every 6 hours  apixaban 5 milliGRAM(s) Oral every 12 hours  buDESOnide    Inhalation Suspension 0.5 milliGRAM(s) Inhalation daily  clopidogrel Tablet 75 milliGRAM(s) Oral daily  dextrose 5%. 1000 milliLiter(s) (100 mL/Hr) IV Continuous <Continuous>  dextrose 5%. 1000 milliLiter(s) (50 mL/Hr) IV Continuous <Continuous>  dextrose 50% Injectable 25 Gram(s) IV Push once  dextrose 50% Injectable 12.5 Gram(s) IV Push once  dextrose 50% Injectable 25 Gram(s) IV Push once  ferrous    sulfate 325 milliGRAM(s) Oral daily  gabapentin 200 milliGRAM(s) Oral three times a day  glucagon  Injectable 1 milliGRAM(s) IntraMuscular once  glycopyrrolate 1 milliGRAM(s) Oral three times a day  insulin glargine Injectable (LANTUS) 25 Unit(s) SubCutaneous at bedtime  insulin lispro (ADMELOG) corrective regimen sliding scale   SubCutaneous three times a day before meals  labetalol 100 milliGRAM(s) Oral every 8 hours  levETIRAcetam 1000 milliGRAM(s) Oral two times a day  megestrol 20 milliGRAM(s) Oral daily  methylPREDNISolone 8 milliGRAM(s) Oral two times a day  mexiletine 200 milliGRAM(s) Oral three times a day  mineral oil 30 milliLiter(s) Oral daily  montelukast 10 milliGRAM(s) Oral daily  NIFEdipine XL 30 milliGRAM(s) Oral daily  pantoprazole    Tablet 40 milliGRAM(s) Oral before breakfast  polyethylene glycol 3350 17 Gram(s) Oral daily  rosuvastatin 5 milliGRAM(s) Oral at bedtime  senna 2 Tablet(s) Oral at bedtime  sertraline 50 milliGRAM(s) Oral daily    MEDICATIONS  (PRN):  albuterol    90 MICROgram(s) HFA Inhaler 2 Puff(s) Inhalation every 6 hours PRN for shortness of breath and/or wheezing  dextrose Oral Gel 15 Gram(s) Oral once PRN Blood Glucose LESS THAN 70 milliGRAM(s)/deciliter      LABS:                        11.8   10.58 )-----------( 200      ( 20 Feb 2025 01:50 )             37.8     02-20    139  |  106  |  15  ----------------------------<  116[H]  3.8   |  28  |  0.77    Ca    8.8      20 Feb 2025 01:50    TPro  6.5  /  Alb  3.0[L]  /  TBili  0.5  /  DBili  x   /  AST  16  /  ALT  17  /  AlkPhos  63  02-20    PT/INR - ( 20 Feb 2025 01:50 )   PT: 17.2 sec;   INR: 1.46 ratio         PTT - ( 20 Feb 2025 01:50 )  PTT:34.7 sec    Urinalysis with Rflx Culture (collected 20 Feb 2025 22:06)        RADIOLOGY & ADDITIONAL STUDIES:    ASSESSMENT & PLAN  74y Female pmhx of Epilepsy on Keppra, COPD,, DM2  asthma on chronic steroids, bronchiectasis, tracheomalacia s/p tracheloplasty HTN, Hx of dissection of descending thoracic aorta, , hx of aortic aneurysm,  Type B dissection (7/2024), Type A dissection s/p bioAVR with Bental procedure (9/2022), severe AS s/p TAVR (9/10/2023), TIA's, DVT Afib on Eliquis, T2DM, Colon cancer S/P resection, Colostomy bag, neuropathy admitted for hypoxia.   General surgery consulted for change in abdominal exam with pain distention and no ostomy output.   Abdominal exam remains benign, with discomfort to palpation of lower abdomen. Ostomy with continued gas, no stool output, consistent with constipation/fecal retention.    PLAN:  - Bowel regimen; ordered Miralax mineral oil, and senna  - Stable from surgical standpoint  - Will follow until BM production     Case discussed with Dr. Benavides

## 2025-02-21 NOTE — PROGRESS NOTE ADULT - ASSESSMENT
As in above full notation, unless countered below.  Ms. Bloom personally seen/ examined during daily rounds.  Denies abdominal pain and reports toleration of diet.  Appliance bag with two large BM's since last pm requiring changes.  Vitals remain non-suggestive.  Abdomen soft and non-tender with viable stoma.  Labs reassuring with normalization of leukocytosis and no shift; no acidosis per chemistry.  Clinically, improving overall following initiation of aggressive bowel regimen with fecal retention.  Surgically, stable for discharge once medically appropriate.  Surgery remains available as needed.  Please recall PRN for questions/ concerns/ changes.  Thank you.

## 2025-02-21 NOTE — PROGRESS NOTE ADULT - ATTENDING COMMENTS
74yFemale pmhx of Epilepsy on Keppra, COPD,, DM2  asthma on chronic steroids, bronchiectasis, tracheomalacia s/p tracheloplasty HTN, Hx of dissection of descending thoracic aorta, , hx of aortic aneurysm,  Type B 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  Evaluated by Dr. Antonio,  Type A dissection s/p bioAVR with Bental procedure (9/2022), severe AS s/p TAVR (9/10/2023), TIA's, DVT Afib on Eliquis, T2DM, Colon cancer S/P Colostomy , neuropathy, presents to the ED for fever. Admitting for Hypoxia at home, Possible COPD Exacerbation  Pt seen, examined, case &  care plan d/w pt, residents at detail.  Consults-  Pulmonary-Dr Luis d/w -No concern for PNA ,No ABx - chronic COPD likely, continue Home meds, Not on O2 , stable for d/c   -ID Dr Peng's group  Dr Soto follow up d/w -restart Home Doxy 100 mg q 12 hrs x 2 days  for positive sputum culture   -Sx Dr Benavides -for No colostomy out put ,Abdominal distention CT scan A/P Non con , constipation on ER CT scan  PT Eval---> Home PT   PO diet  AM labs   DVT ppx on Ac- Eliquis   D/W CM at detail Resume  HHA .   D/c home today   Total  d/c care time is 60  minutes .
74yFemale pmhx of Epilepsy on Keppra, COPD,, DM2  asthma on chronic steroids, bronchiectasis, tracheomalacia s/p tracheloplasty HTN, Hx of dissection of descending thoracic aorta, , hx of aortic aneurysm,  Type B 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  Evaluated by Dr. Antonio,  Type A dissection s/p bioAVR with Bental procedure (9/2022), severe AS s/p TAVR (9/10/2023), TIA's, DVT Afib on Eliquis, T2DM, Colon cancer S/P Colostomy , neuropathy, presents to the ED for fever. Admitting for Hypoxia at home, Possible COPD Exacerbation  Pt seen, examined, case &  care plan d/w pt, residents at detail.  Consults-  Pulmonary-Dr Luis d/w -No concern for PNA ,No ABx -COPD likely, continue Home meds, Not on O2   -ID Dr Peng's group  Dr Soto -Observe off Abx   -Sx Dr Benavides -for No colostomy out put ,Abdominal distention CT scan A/P Non con , constipation on ER CT scan  PT Eval  PO diet  AM labs   DVT ppx on Ac .   Total care time is 45 minutes

## 2025-02-23 NOTE — DIETITIAN NUTRITION RISK NOTIFICATION - ADDITIONAL COMMENTS/DIETITIAN RECOMMENDATIONS
1) Add Glucerna TID per Pt request  2) Rx MVI and vit C 500mg daily, continue ferrous sulfate supplementation  3) Encourage HBV protein sources  4) Monitor weights daily for trend/accuracy   5) Obtain/provide food preferences daily to inc PO 
<-- Click to add NO significant Past Surgical History

## 2025-02-24 NOTE — PRE-ANESTHESIA EVALUATION ADULT - NSDENTALSD_ENT_ALL_CORE
Pt called back. He wanted to know above concerns. Advised that once PCP advises we will call back.   
appears normal and intact

## 2025-02-25 ENCOUNTER — APPOINTMENT (OUTPATIENT)
Dept: PULMONOLOGY | Facility: CLINIC | Age: 75
End: 2025-02-25
Payer: MEDICARE

## 2025-02-25 VITALS
HEART RATE: 62 BPM | SYSTOLIC BLOOD PRESSURE: 126 MMHG | RESPIRATION RATE: 17 BRPM | TEMPERATURE: 97.6 F | BODY MASS INDEX: 24.48 KG/M2 | DIASTOLIC BLOOD PRESSURE: 72 MMHG | WEIGHT: 156 LBS | HEIGHT: 67 IN | OXYGEN SATURATION: 90 %

## 2025-02-25 DIAGNOSIS — D84.9 IMMUNODEFICIENCY, UNSPECIFIED: ICD-10-CM

## 2025-02-25 DIAGNOSIS — R06.02 SHORTNESS OF BREATH: ICD-10-CM

## 2025-02-25 DIAGNOSIS — J39.8 OTHER SPECIFIED DISEASES OF UPPER RESPIRATORY TRACT: ICD-10-CM

## 2025-02-25 DIAGNOSIS — R06.83 SNORING: ICD-10-CM

## 2025-02-25 DIAGNOSIS — J45.50 SEVERE PERSISTENT ASTHMA, UNCOMPLICATED: ICD-10-CM

## 2025-02-25 DIAGNOSIS — K21.9 GASTRO-ESOPHAGEAL REFLUX DISEASE W/OUT ESOPHAGITIS: ICD-10-CM

## 2025-02-25 DIAGNOSIS — A49.02 METHICILLIN RESISTANT STAPHYLOCOCCUS AUREUS INFECTION, UNSPECIFIED SITE: ICD-10-CM

## 2025-02-25 DIAGNOSIS — R93.89 ABNORMAL FINDINGS ON DIAGNOSTIC IMAGING OF OTHER SPECIFIED BODY STRUCTURES: ICD-10-CM

## 2025-02-25 DIAGNOSIS — J45.909 UNSPECIFIED ASTHMA, UNCOMPLICATED: ICD-10-CM

## 2025-02-25 DIAGNOSIS — J44.9 CHRONIC OBSTRUCTIVE PULMONARY DISEASE, UNSPECIFIED: ICD-10-CM

## 2025-02-25 PROBLEM — C18.9 MALIGNANT NEOPLASM OF COLON, UNSPECIFIED: Chronic | Status: ACTIVE | Noted: 2025-02-20

## 2025-02-25 PROCEDURE — ZZZZZ: CPT

## 2025-02-25 PROCEDURE — 96372 THER/PROPH/DIAG INJ SC/IM: CPT

## 2025-02-25 PROCEDURE — 99214 OFFICE O/P EST MOD 30 MIN: CPT | Mod: 25

## 2025-02-25 RX ORDER — TEZEPELUMAB-EKKO 210 MG/1.9ML
210 INJECTION, SOLUTION SUBCUTANEOUS
Qty: 0 | Refills: 0 | Status: COMPLETED | OUTPATIENT
Start: 2025-02-25

## 2025-02-25 RX ORDER — IPRATROPIUM BROMIDE AND ALBUTEROL SULFATE 2.5; .5 MG/3ML; MG/3ML
0.5-2.5 (3) SOLUTION RESPIRATORY (INHALATION) 4 TIMES DAILY
Qty: 120 | Refills: 5 | Status: ACTIVE | COMMUNITY
Start: 2025-02-25 | End: 1900-01-01

## 2025-02-25 RX ADMIN — TEZEPELUMAB-EKKO 210 MG/1.91ML: 210 INJECTION, SOLUTION SUBCUTANEOUS at 00:00

## 2025-02-26 ENCOUNTER — OUTPATIENT (OUTPATIENT)
Dept: OUTPATIENT SERVICES | Facility: HOSPITAL | Age: 75
LOS: 1 days | End: 2025-02-26

## 2025-02-26 ENCOUNTER — APPOINTMENT (OUTPATIENT)
Dept: INTERNAL MEDICINE | Facility: CLINIC | Age: 75
End: 2025-02-26

## 2025-02-26 ENCOUNTER — NON-APPOINTMENT (OUTPATIENT)
Age: 75
End: 2025-02-26

## 2025-02-26 VITALS — DIASTOLIC BLOOD PRESSURE: 60 MMHG | SYSTOLIC BLOOD PRESSURE: 110 MMHG | OXYGEN SATURATION: 90 % | HEART RATE: 52 BPM

## 2025-02-26 DIAGNOSIS — Z95.2 PRESENCE OF PROSTHETIC HEART VALVE: Chronic | ICD-10-CM

## 2025-02-26 DIAGNOSIS — Z98.890 OTHER SPECIFIED POSTPROCEDURAL STATES: Chronic | ICD-10-CM

## 2025-02-26 DIAGNOSIS — H05.352 EXOSTOSIS OF LEFT ORBIT: Chronic | ICD-10-CM

## 2025-02-26 DIAGNOSIS — K62.5 HEMORRHAGE OF ANUS AND RECTUM: Chronic | ICD-10-CM

## 2025-02-26 DIAGNOSIS — Z96.653 PRESENCE OF ARTIFICIAL KNEE JOINT, BILATERAL: Chronic | ICD-10-CM

## 2025-02-26 DIAGNOSIS — Z98.89 OTHER SPECIFIED POSTPROCEDURAL STATES: Chronic | ICD-10-CM

## 2025-02-26 DIAGNOSIS — I10 ESSENTIAL (PRIMARY) HYPERTENSION: ICD-10-CM

## 2025-02-26 PROCEDURE — 99213 OFFICE O/P EST LOW 20 MIN: CPT

## 2025-02-26 PROCEDURE — G0463: CPT

## 2025-02-26 PROCEDURE — G2211 COMPLEX E/M VISIT ADD ON: CPT

## 2025-02-28 ENCOUNTER — APPOINTMENT (OUTPATIENT)
Dept: NEUROLOGY | Facility: CLINIC | Age: 75
End: 2025-02-28

## 2025-02-28 PROCEDURE — 99214 OFFICE O/P EST MOD 30 MIN: CPT | Mod: 2W

## 2025-02-28 RX ORDER — LACOSAMIDE 100 MG/1
100 TABLET ORAL
Qty: 60 | Refills: 5 | Status: ACTIVE | COMMUNITY
Start: 2025-02-28 | End: 1900-01-01

## 2025-02-28 RX ORDER — LEVETIRACETAM 500 MG/1
500 TABLET, FILM COATED, EXTENDED RELEASE ORAL
Qty: 60 | Refills: 5 | Status: ACTIVE | COMMUNITY
Start: 2025-02-28 | End: 1900-01-01

## 2025-02-28 NOTE — INPATIENT CERTIFICATION FOR MEDICARE PATIENTS - IN ORDER TO MEET MEDICARE REQUIREMENTS.
In order to meet Medicare requirements, the clinical documentation must support the information cited in the admission order.  Please be sure to provide detailed and clear documentation about the following in the admitting note/history and physical: show

## 2025-03-03 LAB — BACTERIA SPT CULT: NORMAL

## 2025-03-05 ENCOUNTER — APPOINTMENT (OUTPATIENT)
Dept: NEUROLOGY | Facility: CLINIC | Age: 75
End: 2025-03-05

## 2025-03-17 ENCOUNTER — RX RENEWAL (OUTPATIENT)
Age: 75
End: 2025-03-17

## 2025-03-19 ENCOUNTER — APPOINTMENT (OUTPATIENT)
Dept: OTOLARYNGOLOGY | Facility: CLINIC | Age: 75
End: 2025-03-19

## 2025-03-19 VITALS
DIASTOLIC BLOOD PRESSURE: 71 MMHG | HEART RATE: 60 BPM | WEIGHT: 155 LBS | BODY MASS INDEX: 24.33 KG/M2 | OXYGEN SATURATION: 95 % | HEIGHT: 67 IN | SYSTOLIC BLOOD PRESSURE: 119 MMHG

## 2025-03-19 PROCEDURE — 99214 OFFICE O/P EST MOD 30 MIN: CPT | Mod: 25

## 2025-03-19 PROCEDURE — 31579 LARYNGOSCOPY TELESCOPIC: CPT

## 2025-03-19 RX ORDER — FAMOTIDINE 20 MG/1
20 TABLET, FILM COATED ORAL
Qty: 90 | Refills: 0 | Status: ACTIVE | COMMUNITY
Start: 2025-03-19 | End: 1900-01-01

## 2025-03-19 RX ORDER — IPRATROPIUM BROMIDE 42 UG/1
0.06 SPRAY, METERED NASAL
Qty: 1 | Refills: 3 | Status: ACTIVE | COMMUNITY
Start: 2025-03-19 | End: 1900-01-01

## 2025-03-20 ENCOUNTER — APPOINTMENT (OUTPATIENT)
Dept: PULMONOLOGY | Facility: CLINIC | Age: 75
End: 2025-03-20
Payer: MEDICARE

## 2025-03-20 PROCEDURE — 99213 OFFICE O/P EST LOW 20 MIN: CPT | Mod: 93

## 2025-03-25 ENCOUNTER — APPOINTMENT (OUTPATIENT)
Dept: INFECTIOUS DISEASE | Facility: CLINIC | Age: 75
End: 2025-03-25
Payer: MEDICARE

## 2025-03-25 ENCOUNTER — NON-APPOINTMENT (OUTPATIENT)
Age: 75
End: 2025-03-25

## 2025-03-25 ENCOUNTER — APPOINTMENT (OUTPATIENT)
Dept: PULMONOLOGY | Facility: CLINIC | Age: 75
End: 2025-03-25
Payer: MEDICARE

## 2025-03-25 VITALS
RESPIRATION RATE: 18 BRPM | HEART RATE: 48 BPM | OXYGEN SATURATION: 94 % | TEMPERATURE: 97.3 F | WEIGHT: 155 LBS | HEIGHT: 67 IN | BODY MASS INDEX: 24.33 KG/M2 | DIASTOLIC BLOOD PRESSURE: 80 MMHG | SYSTOLIC BLOOD PRESSURE: 126 MMHG

## 2025-03-25 VITALS — BODY MASS INDEX: 23.7 KG/M2 | HEIGHT: 67 IN | WEIGHT: 151 LBS

## 2025-03-25 PROCEDURE — 99214 OFFICE O/P EST MOD 30 MIN: CPT

## 2025-03-25 PROCEDURE — 96372 THER/PROPH/DIAG INJ SC/IM: CPT

## 2025-03-25 RX ORDER — TEZEPELUMAB-EKKO 210 MG/1.9ML
210 INJECTION, SOLUTION SUBCUTANEOUS
Qty: 0 | Refills: 0 | Status: COMPLETED | OUTPATIENT
Start: 2025-03-25

## 2025-03-25 NOTE — ED ADULT NURSE NOTE - BOWEL SOUNDS LUQ
Spoke with patient and reviewed labs ordered to be drawn 03/31/2025. She reports she had similar labs drawn at [facility] in January. Writer reviewed labs with patient. Some abnormal lab results notes. Writer suggested having the ordered labs (magnesium, TSH, CMP, CBC, lipid panel) redrawn to reassess. Patient is agreeable. Results to be reviewed during office visit 04/07/2025. Closing encounter.    present

## 2025-04-01 ENCOUNTER — NON-APPOINTMENT (OUTPATIENT)
Age: 75
End: 2025-04-01

## 2025-04-01 LAB
BACTERIA SPT CULT: ABNORMAL
FUNGUS SPT CULT: ABNORMAL
INR PPP: 1.35 RATIO
PT BLD: 15.9 SEC

## 2025-04-02 ENCOUNTER — OUTPATIENT (OUTPATIENT)
Dept: OUTPATIENT SERVICES | Facility: HOSPITAL | Age: 75
LOS: 1 days | End: 2025-04-02
Payer: MEDICARE

## 2025-04-02 ENCOUNTER — TRANSCRIPTION ENCOUNTER (OUTPATIENT)
Age: 75
End: 2025-04-02

## 2025-04-02 ENCOUNTER — NON-APPOINTMENT (OUTPATIENT)
Age: 75
End: 2025-04-02

## 2025-04-02 VITALS
TEMPERATURE: 97 F | RESPIRATION RATE: 18 BRPM | HEART RATE: 52 BPM | HEIGHT: 67 IN | SYSTOLIC BLOOD PRESSURE: 143 MMHG | WEIGHT: 149.91 LBS | DIASTOLIC BLOOD PRESSURE: 62 MMHG

## 2025-04-02 DIAGNOSIS — Z98.890 OTHER SPECIFIED POSTPROCEDURAL STATES: Chronic | ICD-10-CM

## 2025-04-02 DIAGNOSIS — A49.8 OTHER BACTERIAL INFECTIONS OF UNSPECIFIED SITE: ICD-10-CM

## 2025-04-02 DIAGNOSIS — Z45.2 ENCOUNTER FOR ADJUSTMENT AND MANAGEMENT OF VASCULAR ACCESS DEVICE: ICD-10-CM

## 2025-04-02 DIAGNOSIS — Z95.2 PRESENCE OF PROSTHETIC HEART VALVE: Chronic | ICD-10-CM

## 2025-04-02 DIAGNOSIS — Z98.89 OTHER SPECIFIED POSTPROCEDURAL STATES: Chronic | ICD-10-CM

## 2025-04-02 DIAGNOSIS — Z93.3 COLOSTOMY STATUS: Chronic | ICD-10-CM

## 2025-04-02 DIAGNOSIS — J18.9 PNEUMONIA, UNSPECIFIED ORGANISM: ICD-10-CM

## 2025-04-02 DIAGNOSIS — Z87.09 PERSONAL HISTORY OF OTHER DISEASES OF THE RESPIRATORY SYSTEM: Chronic | ICD-10-CM

## 2025-04-02 DIAGNOSIS — J98.4 OTHER DISORDERS OF LUNG: ICD-10-CM

## 2025-04-02 DIAGNOSIS — Z96.653 PRESENCE OF ARTIFICIAL KNEE JOINT, BILATERAL: Chronic | ICD-10-CM

## 2025-04-02 DIAGNOSIS — K62.5 HEMORRHAGE OF ANUS AND RECTUM: Chronic | ICD-10-CM

## 2025-04-02 DIAGNOSIS — H05.352 EXOSTOSIS OF LEFT ORBIT: Chronic | ICD-10-CM

## 2025-04-02 PROCEDURE — 76937 US GUIDE VASCULAR ACCESS: CPT | Mod: 26

## 2025-04-02 PROCEDURE — C1751: CPT

## 2025-04-02 PROCEDURE — 77001 FLUOROGUIDE FOR VEIN DEVICE: CPT

## 2025-04-02 PROCEDURE — 76000 FLUOROSCOPY <1 HR PHYS/QHP: CPT

## 2025-04-02 PROCEDURE — 36410 VNPNXR 3YR/> PHY/QHP DX/THER: CPT

## 2025-04-02 PROCEDURE — 76937 US GUIDE VASCULAR ACCESS: CPT

## 2025-04-02 PROCEDURE — 77001 FLUOROGUIDE FOR VEIN DEVICE: CPT | Mod: 26

## 2025-04-02 NOTE — ASU PATIENT PROFILE, ADULT - FALL HARM RISK - HARM RISK INTERVENTIONS
Assistance with ambulation/Assistance OOB with selected safe patient handling equipment/Communicate Risk of Fall with Harm to all staff/Discuss with provider need for PT consult/Monitor gait and stability/Provide patient with walking aids - walker, cane, crutches/Reinforce activity limits and safety measures with patient and family/Tailored Fall Risk Interventions/Visual Cue: Yellow wristband and red socks/Bed in lowest position, wheels locked, appropriate side rails in place/Call bell, personal items and telephone in reach/Instruct patient to call for assistance before getting out of bed or chair/Non-slip footwear when patient is out of bed/[place] to call system/Physically safe environment - no spills, clutter or unnecessary equipment/Purposeful Proactive Rounding/Room/bathroom lighting operational, light cord in reach

## 2025-04-02 NOTE — ASU PATIENT PROFILE, ADULT - NSICDXPASTSURGICALHX_GEN_ALL_CORE_FT
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    H/O pelvic surgery 5 years ago - s/p fracture    H/O total knee replacement, bilateral 5 years ago    History of partial hysterectomy 30 years ago - fibroids    History of sinus surgery multiple sinus surgeries    History of tracheomalacia 2015 - attempted tracheal stenting ([facility])- course complicated by obstruction, respiratory failure, multiple CPR attempts -  stent discontinued; 10/20/2016 Tracheobronchoplasty (Prolene Mesh) performed at [facility] by Dr Zapien    Rectal bleeding exam under anesthesia ([facility]) 2/2018    S/P aortic valve replacement     S/P AVR (aortic valve replacement)     S/P bronchoscopy 6/5/2018 - [place] (Dr Zapien) no evidence of tracheobronchomalacia in trachea or bronchial tubes    S/P colostomy     S/P TAVR (transcatheter aortic valve replacement)

## 2025-04-02 NOTE — ASU PATIENT PROFILE, ADULT - NSICDXPASTMEDICALHX_GEN_ALL_CORE_FT
PAST MEDICAL HISTORY:  Afib     Aortic valve replaced     Asthma     Atrial fibrillation     Bronchiectasis     Colon cancer     COPD (chronic obstructive pulmonary disease)     DM (diabetes mellitus)     DVT (deep venous thrombosis) 15-20 years ago, took coumadin    Epilepsy     History of COPD     History of seizure disorder     History of TIAs     HTN (hypertension)     Seizure x 1 1/7/18    TIA (transient ischemic attack) multiple, last 5 years ago - presents as right-sided weakness

## 2025-04-02 NOTE — ASU DISCHARGE PLAN (ADULT/PEDIATRIC) - NURSING INSTRUCTIONS
Please feel free to contact us at [phone] if any problems arise. After 6PM, Monday through Friday, on weekends and on holidays, please call [phone] and ask for the radiology resident on call to be paged.

## 2025-04-02 NOTE — ASU DISCHARGE PLAN (ADULT/PEDIATRIC) - ASU DC SPECIAL INSTRUCTIONSFT
Midline Placement    Discharge Instructions  - You have had a midline placed your arm.   - The midline is ready for use.  - You may shower as long as the PICC and dressing remains dry.  -  No soaking or swimming until the PICC is removed or when the site is completely healed.  - Keep the area covered and dry for as long as the PICC remains in. It may be removed and changed by a nurse as needed.  - Do not perform any heavy lifting or put tension on the area for the next week or until the site is healed.  - You may resume your normal diet.  - You may resume your normal medications however you should wait 48 hours before restarting aspirin, plavix, or blood thinners.  - It is normal to experience some pain over the site for the next few days. You may take apply ice to the area (20 minutes on, 20 minutes off) and take Tylenol for that pain. Do not take more frequently than every 6 hours and do not exceed more than 3000mg of Tylenol in a 24 hour period.    Notify your primary physician and/or Interventional Radiology IMMEDIATELY if you experience any of the following       - Fever of 100.4F or 38C       - Chills or Rigors/ Shakes       - Swelling and/or Redness in the area around the port       - Worsening Pain       - Blood soaked bandages or worsening bleeding       - Lightheadedness and/or dizziness upon standing       - Chest Pain/ Tightness       - Shortness of Breath       - Difficulty walking    If you have a problem that you believe requires IMMEDIATE attention, please go to your NEAREST Emergency Room. If you believe your problem can safely wait until you speak to a physician, please call Interventional Radiology for any concerns.      Please feel free to contact us at [phone] if any problems arise. After 6PM, Monday through Friday, on weekends and on holidays, please call [phone] and ask for the radiology resident on call to be paged.

## 2025-04-02 NOTE — ASU DISCHARGE PLAN (ADULT/PEDIATRIC) - FINANCIAL ASSISTANCE
[facility] provides services at a reduced cost to those who are determined to be eligible through [facility]’s financial assistance program. Information regarding [facility]’s financial assistance program can be found by going to [email or web address] or by calling [phone].

## 2025-04-02 NOTE — H&P ADULT - HISTORY OF PRESENT ILLNESS
Interventional Radiology    HPI: 74yFemale pmhx of Epilepsy on Keppra, COPD,, DM2  asthma on chronic steroids, bronchiectasis, tracheomalacia s/p tracheloplasty HTN, Hx of dissection of descending thoracic aorta, , hx of aortic aneurysm,  Type B 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  Evaluated by Dr. Antonio,  Type A dissection s/p bioAVR with Bental procedure (9/2022), severe AS s/p TAVR (9/10/2023), TIA's, DVT Afib on Eliquis, T2DM, Colon cancer S/P resection, Colostomy bag , neuropathy now presents to IR for midline placement for IV Abx.     Review of Systems:   ROS negative x10 systems unless otherwise stated in HPI     Allergies: penicillin (Anaphylaxis)  meropenem (Unknown)  codeine (Unknown)  codeine (Short breath)  Avelox (Short breath; Pruritus)  penicillin (Unknown)  Avelox (Unknown)  Percocet (Unknown)  aspirin (Unknown)  Valium (Unknown)  Dilaudid (Short breath)  cefepime (Anaphylaxis)  OxyContin (Unknown)  tetanus toxoid (Short breath)  aspirin (Short breath)  cefepime (Short breath (Severe))  iodine (Short breath; Swelling)  Valium (Short breath)  ampicillin (Unknown)    Medications (Abx/Cardiac/Anticoagulation/Blood Products)  Reviewed     Data:  170.2  68  T(C): 36.1  HR: 52  BP: 143/62  RR: 18  SpO2: --    Physical Exam  General: No acute distress, nontoxic, A&Ox3  Chest: Non labored breathing    RADIOLOGY & ADDITIONAL TESTS:    Imaging Reviewed    H & P Note Reviewed from: 2/22    Plan: 74y Female presents for midline placement  -Risks/Benefits/alternatives explained with the patient and/or healthcare proxy and witnessed informed consent obtained.

## 2025-04-03 ENCOUNTER — NON-APPOINTMENT (OUTPATIENT)
Age: 75
End: 2025-04-03

## 2025-04-04 ENCOUNTER — NON-APPOINTMENT (OUTPATIENT)
Age: 75
End: 2025-04-04

## 2025-04-04 LAB
ACID FAST STN SPT: NORMAL
ALBUMIN SERPL ELPH-MCNC: 3.9 G/DL
ALP BLD-CCNC: 96 U/L
ALT SERPL-CCNC: 37 U/L
ANION GAP SERPL CALC-SCNC: 13 MMOL/L
AST SERPL-CCNC: 30 U/L
BASOPHILS # BLD AUTO: 0.01 K/UL
BASOPHILS NFR BLD AUTO: 0.1 %
BILIRUB SERPL-MCNC: 0.2 MG/DL
BUN SERPL-MCNC: 21 MG/DL
CALCIUM SERPL-MCNC: 9.1 MG/DL
CHLORIDE SERPL-SCNC: 105 MMOL/L
CO2 SERPL-SCNC: 24 MMOL/L
CREAT SERPL-MCNC: 0.82 MG/DL
EGFRCR SERPLBLD CKD-EPI 2021: 74 ML/MIN/1.73M2
EOSINOPHIL # BLD AUTO: 0.01 K/UL
EOSINOPHIL NFR BLD AUTO: 0.1 %
FUNGUS SPT CULT: ABNORMAL
FUNGUS SPT CULT: ABNORMAL
GLUCOSE SERPL-MCNC: 302 MG/DL
HCT VFR BLD CALC: 36.9 %
HGB BLD-MCNC: 11.5 G/DL
IMM GRANULOCYTES NFR BLD AUTO: 0.7 %
LYMPHOCYTES # BLD AUTO: 1.08 K/UL
LYMPHOCYTES NFR BLD AUTO: 12.7 %
MAN DIFF?: NORMAL
MCHC RBC-ENTMCNC: 24.5 PG
MCHC RBC-ENTMCNC: 31.2 G/DL
MCV RBC AUTO: 78.7 FL
MONOCYTES # BLD AUTO: 0.44 K/UL
MONOCYTES NFR BLD AUTO: 5.2 %
NEUTROPHILS # BLD AUTO: 6.89 K/UL
NEUTROPHILS NFR BLD AUTO: 81.2 %
PLATELET # BLD AUTO: 233 K/UL
POTASSIUM SERPL-SCNC: 5.3 MMOL/L
PROT SERPL-MCNC: 6.3 G/DL
RBC # BLD: 4.69 M/UL
RBC # FLD: 17.9 %
SODIUM SERPL-SCNC: 142 MMOL/L
WBC # FLD AUTO: 8.49 K/UL

## 2025-04-08 ENCOUNTER — LABORATORY RESULT (OUTPATIENT)
Age: 75
End: 2025-04-08

## 2025-04-09 NOTE — DIETITIAN INITIAL EVALUATION ADULT. - ENERGY NEEDS
Copied from CRM #[id number]. Topic: MW Medication/Rx - MW Rx Refill  >> 2025  8:19 AM Genevieve LÓPEZ wrote:  Olivia Mcneal called to request a medication refill and currently has medication during working hrs.  Medication is:    Listed on Med Management list. Pended medication. Selected 'Wrap Up CRM' and created new Refill Med Encounter after clicking 'Convert to Clinical Call'. Sent Med template and routed as routine priority per Care Site Dept KB page for Med Refill Working Hrs support to appropriate clinical pool. Readback full message.-- DO NOT REPLY / DO NOT REPLY ALL --  -- This inbox is not monitored. If this was sent to the wrong provider or department, reroute message to P ECO Reroute pool. --  -- Message is from Engagement Center Operations (ECO) --      Message Type:  Refill Medication   Refill request for Pended medication named: celecoxib (CeleBREX) 200 MG capsule ()   Preferred pharmacy verified, and selected.   [id number] [address] AT [facility]    Is the patient OUT of Medication?  No Medication Refills handled by ECO Clinical - route as routine priority to ECO CLINICAL MSG pool. Patient has been advised it will be addressed within 2-3 business days.     Message: Patient stated she has a couple of pills left - Requesting 90 day supply.                    Height: 67 inches, Weight: 143 pounds  BMI: 22.4 kg/m2 IBW: 135 pounds (+/-10%), %IBW: 106%  Pertinent Info: Per chart, 68F PMH severe asthma/COPD, tracheomalacia s/p tracheo-bronchoplasty (2016), anal SCC s/p chemo/RT/proctectomy with colostomy, adrenal insufficiency on chronic medrol, CKD, afib on apixaban, T2DM, remote DVT, recent admission for coronavirus infection/COPD exacerbation/CAP presents with fever, increased sputum production, and dysuria x 1-2 days, admitted meeting SIRS criteria, suspicious for pulmonary vs urinary source, colorectal team following for perianal wound. No edema, no pressure ulcers noted at this time.

## 2025-04-14 ENCOUNTER — NON-APPOINTMENT (OUTPATIENT)
Age: 75
End: 2025-04-14

## 2025-04-14 DIAGNOSIS — G47.33 OBSTRUCTIVE SLEEP APNEA (ADULT) (PEDIATRIC): ICD-10-CM

## 2025-04-15 ENCOUNTER — LABORATORY RESULT (OUTPATIENT)
Age: 75
End: 2025-04-15

## 2025-04-16 ENCOUNTER — LABORATORY RESULT (OUTPATIENT)
Age: 75
End: 2025-04-16

## 2025-04-17 ENCOUNTER — INPATIENT (INPATIENT)
Facility: HOSPITAL | Age: 75
LOS: 6 days | Discharge: HOME HEALTH | DRG: 203 | End: 2025-04-24
Attending: INTERNAL MEDICINE | Admitting: STUDENT IN AN ORGANIZED HEALTH CARE EDUCATION/TRAINING PROGRAM
Payer: MEDICARE

## 2025-04-17 VITALS
SYSTOLIC BLOOD PRESSURE: 122 MMHG | TEMPERATURE: 98 F | RESPIRATION RATE: 20 BRPM | WEIGHT: 145.06 LBS | HEIGHT: 67 IN | HEART RATE: 48 BPM | DIASTOLIC BLOOD PRESSURE: 75 MMHG | OXYGEN SATURATION: 96 %

## 2025-04-17 DIAGNOSIS — Z86.79 PERSONAL HISTORY OF OTHER DISEASES OF THE CIRCULATORY SYSTEM: ICD-10-CM

## 2025-04-17 DIAGNOSIS — Z96.653 PRESENCE OF ARTIFICIAL KNEE JOINT, BILATERAL: Chronic | ICD-10-CM

## 2025-04-17 DIAGNOSIS — Z98.89 OTHER SPECIFIED POSTPROCEDURAL STATES: Chronic | ICD-10-CM

## 2025-04-17 DIAGNOSIS — G40.909 EPILEPSY, UNSPECIFIED, NOT INTRACTABLE, WITHOUT STATUS EPILEPTICUS: ICD-10-CM

## 2025-04-17 DIAGNOSIS — R56.9 UNSPECIFIED CONVULSIONS: ICD-10-CM

## 2025-04-17 DIAGNOSIS — J44.89 OTHER SPECIFIED CHRONIC OBSTRUCTIVE PULMONARY DISEASE: ICD-10-CM

## 2025-04-17 DIAGNOSIS — I10 ESSENTIAL (PRIMARY) HYPERTENSION: ICD-10-CM

## 2025-04-17 DIAGNOSIS — Z95.2 PRESENCE OF PROSTHETIC HEART VALVE: Chronic | ICD-10-CM

## 2025-04-17 DIAGNOSIS — Z98.890 OTHER SPECIFIED POSTPROCEDURAL STATES: Chronic | ICD-10-CM

## 2025-04-17 DIAGNOSIS — Z93.3 COLOSTOMY STATUS: Chronic | ICD-10-CM

## 2025-04-17 DIAGNOSIS — J45.901 UNSPECIFIED ASTHMA WITH (ACUTE) EXACERBATION: ICD-10-CM

## 2025-04-17 DIAGNOSIS — Z29.9 ENCOUNTER FOR PROPHYLACTIC MEASURES, UNSPECIFIED: ICD-10-CM

## 2025-04-17 DIAGNOSIS — H05.352 EXOSTOSIS OF LEFT ORBIT: Chronic | ICD-10-CM

## 2025-04-17 DIAGNOSIS — K62.5 HEMORRHAGE OF ANUS AND RECTUM: Chronic | ICD-10-CM

## 2025-04-17 DIAGNOSIS — R29.6 REPEATED FALLS: ICD-10-CM

## 2025-04-17 DIAGNOSIS — Z87.09 PERSONAL HISTORY OF OTHER DISEASES OF THE RESPIRATORY SYSTEM: Chronic | ICD-10-CM

## 2025-04-17 DIAGNOSIS — R44.1 VISUAL HALLUCINATIONS: ICD-10-CM

## 2025-04-17 DIAGNOSIS — Z95.2 PRESENCE OF PROSTHETIC HEART VALVE: ICD-10-CM

## 2025-04-17 DIAGNOSIS — I48.91 UNSPECIFIED ATRIAL FIBRILLATION: ICD-10-CM

## 2025-04-17 DIAGNOSIS — C18.9 MALIGNANT NEOPLASM OF COLON, UNSPECIFIED: ICD-10-CM

## 2025-04-17 DIAGNOSIS — E11.9 TYPE 2 DIABETES MELLITUS WITHOUT COMPLICATIONS: ICD-10-CM

## 2025-04-17 DIAGNOSIS — J18.9 PNEUMONIA, UNSPECIFIED ORGANISM: ICD-10-CM

## 2025-04-17 DIAGNOSIS — I82.409 ACUTE EMBOLISM AND THROMBOSIS OF UNSPECIFIED DEEP VEINS OF UNSPECIFIED LOWER EXTREMITY: ICD-10-CM

## 2025-04-17 LAB
ADD ON TEST-SPECIMEN IN LAB: SIGNIFICANT CHANGE UP
ALBUMIN SERPL ELPH-MCNC: 3.7 G/DL — SIGNIFICANT CHANGE UP (ref 3.3–5)
ALP SERPL-CCNC: 92 U/L — SIGNIFICANT CHANGE UP (ref 40–120)
ALT FLD-CCNC: 44 U/L — SIGNIFICANT CHANGE UP (ref 10–45)
ANION GAP SERPL CALC-SCNC: 14 MMOL/L — SIGNIFICANT CHANGE UP (ref 5–17)
APPEARANCE UR: CLEAR — SIGNIFICANT CHANGE UP
AST SERPL-CCNC: 45 U/L — HIGH (ref 10–40)
BACTERIA # UR AUTO: NEGATIVE /HPF — SIGNIFICANT CHANGE UP
BASOPHILS # BLD AUTO: 0.03 K/UL — SIGNIFICANT CHANGE UP (ref 0–0.2)
BASOPHILS NFR BLD AUTO: 0.3 % — SIGNIFICANT CHANGE UP (ref 0–2)
BILIRUB SERPL-MCNC: 0.4 MG/DL — SIGNIFICANT CHANGE UP (ref 0.2–1.2)
BILIRUB UR-MCNC: NEGATIVE — SIGNIFICANT CHANGE UP
BUN SERPL-MCNC: 17 MG/DL — SIGNIFICANT CHANGE UP (ref 7–23)
CALCIUM SERPL-MCNC: 9.3 MG/DL — SIGNIFICANT CHANGE UP (ref 8.4–10.5)
CAST: 1 /LPF — SIGNIFICANT CHANGE UP (ref 0–4)
CHLORIDE SERPL-SCNC: 101 MMOL/L — SIGNIFICANT CHANGE UP (ref 96–108)
CO2 SERPL-SCNC: 23 MMOL/L — SIGNIFICANT CHANGE UP (ref 22–31)
COLOR SPEC: YELLOW — SIGNIFICANT CHANGE UP
CREAT SERPL-MCNC: 0.67 MG/DL — SIGNIFICANT CHANGE UP (ref 0.5–1.3)
DIFF PNL FLD: NEGATIVE — SIGNIFICANT CHANGE UP
EGFR: 91 ML/MIN/1.73M2 — SIGNIFICANT CHANGE UP
EGFR: 91 ML/MIN/1.73M2 — SIGNIFICANT CHANGE UP
EOSINOPHIL # BLD AUTO: 0.02 K/UL — SIGNIFICANT CHANGE UP (ref 0–0.5)
EOSINOPHIL NFR BLD AUTO: 0.2 % — SIGNIFICANT CHANGE UP (ref 0–6)
GAS PNL BLDV: SIGNIFICANT CHANGE UP
GLUCOSE BLDC GLUCOMTR-MCNC: 258 MG/DL — HIGH (ref 70–99)
GLUCOSE BLDC GLUCOMTR-MCNC: 286 MG/DL — HIGH (ref 70–99)
GLUCOSE BLDC GLUCOMTR-MCNC: 297 MG/DL — HIGH (ref 70–99)
GLUCOSE SERPL-MCNC: 338 MG/DL — HIGH (ref 70–99)
GLUCOSE UR QL: 500 MG/DL
HCT VFR BLD CALC: 35.7 % — SIGNIFICANT CHANGE UP (ref 34.5–45)
HGB BLD-MCNC: 11 G/DL — LOW (ref 11.5–15.5)
IMM GRANULOCYTES NFR BLD AUTO: 0.6 % — SIGNIFICANT CHANGE UP (ref 0–0.9)
KETONES UR-MCNC: ABNORMAL MG/DL
LEUKOCYTE ESTERASE UR-ACNC: ABNORMAL
LIDOCAIN IGE QN: 11 U/L — SIGNIFICANT CHANGE UP (ref 7–60)
LYMPHOCYTES # BLD AUTO: 0.93 K/UL — LOW (ref 1–3.3)
LYMPHOCYTES # BLD AUTO: 10.5 % — LOW (ref 13–44)
MAGNESIUM SERPL-MCNC: 2.2 MG/DL — SIGNIFICANT CHANGE UP (ref 1.6–2.6)
MCHC RBC-ENTMCNC: 24 PG — LOW (ref 27–34)
MCHC RBC-ENTMCNC: 30.8 G/DL — LOW (ref 32–36)
MCV RBC AUTO: 77.9 FL — LOW (ref 80–100)
MONOCYTES # BLD AUTO: 0.94 K/UL — HIGH (ref 0–0.9)
MONOCYTES NFR BLD AUTO: 10.6 % — SIGNIFICANT CHANGE UP (ref 2–14)
NEUTROPHILS # BLD AUTO: 6.86 K/UL — SIGNIFICANT CHANGE UP (ref 1.8–7.4)
NEUTROPHILS NFR BLD AUTO: 77.8 % — HIGH (ref 43–77)
NITRITE UR-MCNC: NEGATIVE — SIGNIFICANT CHANGE UP
NRBC BLD AUTO-RTO: 0 /100 WBCS — SIGNIFICANT CHANGE UP (ref 0–0)
NT-PROBNP SERPL-SCNC: 3648 PG/ML — HIGH (ref 0–300)
PH UR: 6.5 — SIGNIFICANT CHANGE UP (ref 5–8)
PLATELET # BLD AUTO: 292 K/UL — SIGNIFICANT CHANGE UP (ref 150–400)
POTASSIUM SERPL-MCNC: 5.5 MMOL/L — HIGH (ref 3.5–5.3)
POTASSIUM SERPL-MCNC: SIGNIFICANT CHANGE UP MMOL/L (ref 3.5–5.3)
POTASSIUM SERPL-SCNC: 5.5 MMOL/L — HIGH (ref 3.5–5.3)
POTASSIUM SERPL-SCNC: SIGNIFICANT CHANGE UP MMOL/L (ref 3.5–5.3)
PROCALCITONIN SERPL-MCNC: 0.06 NG/ML — SIGNIFICANT CHANGE UP (ref 0.02–0.1)
PROT SERPL-MCNC: 6.8 G/DL — SIGNIFICANT CHANGE UP (ref 6–8.3)
PROT UR-MCNC: SIGNIFICANT CHANGE UP MG/DL
RBC # BLD: 4.58 M/UL — SIGNIFICANT CHANGE UP (ref 3.8–5.2)
RBC # FLD: 16.8 % — HIGH (ref 10.3–14.5)
RBC CASTS # UR COMP ASSIST: 5 /HPF — HIGH (ref 0–4)
SODIUM SERPL-SCNC: 138 MMOL/L — SIGNIFICANT CHANGE UP (ref 135–145)
SP GR SPEC: 1.03 — SIGNIFICANT CHANGE UP (ref 1–1.03)
SQUAMOUS # UR AUTO: 3 /HPF — SIGNIFICANT CHANGE UP (ref 0–5)
TROPONIN T, HIGH SENSITIVITY RESULT: 35 NG/L — SIGNIFICANT CHANGE UP (ref 0–51)
UROBILINOGEN FLD QL: 0.2 MG/DL — SIGNIFICANT CHANGE UP (ref 0.2–1)
WBC # BLD: 8.83 K/UL — SIGNIFICANT CHANGE UP (ref 3.8–10.5)
WBC # FLD AUTO: 8.83 K/UL — SIGNIFICANT CHANGE UP (ref 3.8–10.5)
WBC UR QL: 20 /HPF — HIGH (ref 0–5)

## 2025-04-17 PROCEDURE — 71045 X-RAY EXAM CHEST 1 VIEW: CPT | Mod: 26

## 2025-04-17 PROCEDURE — 93308 TTE F-UP OR LMTD: CPT | Mod: 26

## 2025-04-17 PROCEDURE — 99285 EMERGENCY DEPT VISIT HI MDM: CPT | Mod: FS

## 2025-04-17 PROCEDURE — 99223 1ST HOSP IP/OBS HIGH 75: CPT

## 2025-04-17 PROCEDURE — 71250 CT THORAX DX C-: CPT | Mod: 26

## 2025-04-17 PROCEDURE — 93010 ELECTROCARDIOGRAM REPORT: CPT

## 2025-04-17 RX ORDER — MEXILETINE HYDROCHLORIDE 250 MG/1
200 CAPSULE ORAL THREE TIMES A DAY
Refills: 0 | Status: DISCONTINUED | OUTPATIENT
Start: 2025-04-17 | End: 2025-04-24

## 2025-04-17 RX ORDER — APIXABAN 2.5 MG/1
5 TABLET, FILM COATED ORAL ONCE
Refills: 0 | Status: COMPLETED | OUTPATIENT
Start: 2025-04-17 | End: 2025-04-17

## 2025-04-17 RX ORDER — INSULIN GLARGINE-YFGN 100 [IU]/ML
22 INJECTION, SOLUTION SUBCUTANEOUS AT BEDTIME
Refills: 0 | Status: DISCONTINUED | OUTPATIENT
Start: 2025-04-17 | End: 2025-04-17

## 2025-04-17 RX ORDER — DEXTROSE 50 % IN WATER 50 %
25 SYRINGE (ML) INTRAVENOUS ONCE
Refills: 0 | Status: DISCONTINUED | OUTPATIENT
Start: 2025-04-17 | End: 2025-04-24

## 2025-04-17 RX ORDER — DEXTROSE 50 % IN WATER 50 %
15 SYRINGE (ML) INTRAVENOUS ONCE
Refills: 0 | Status: DISCONTINUED | OUTPATIENT
Start: 2025-04-17 | End: 2025-04-24

## 2025-04-17 RX ORDER — INSULIN GLARGINE-YFGN 100 [IU]/ML
18 INJECTION, SOLUTION SUBCUTANEOUS EVERY MORNING
Refills: 0 | Status: DISCONTINUED | OUTPATIENT
Start: 2025-04-17 | End: 2025-04-17

## 2025-04-17 RX ORDER — SERTRALINE 100 MG/1
50 TABLET, FILM COATED ORAL DAILY
Refills: 0 | Status: DISCONTINUED | OUTPATIENT
Start: 2025-04-17 | End: 2025-04-24

## 2025-04-17 RX ORDER — INSULIN GLARGINE-YFGN 100 [IU]/ML
28 INJECTION, SOLUTION SUBCUTANEOUS AT BEDTIME
Refills: 0 | Status: DISCONTINUED | OUTPATIENT
Start: 2025-04-17 | End: 2025-04-20

## 2025-04-17 RX ORDER — GLYCOPYRROLATE 0.2 MG/ML
1 INJECTION INTRAMUSCULAR; INTRAVENOUS ONCE
Refills: 0 | Status: COMPLETED | OUTPATIENT
Start: 2025-04-17 | End: 2025-04-17

## 2025-04-17 RX ORDER — METHYLPREDNISOLONE ACETATE 80 MG/ML
0 INJECTION, SUSPENSION INTRA-ARTICULAR; INTRALESIONAL; INTRAMUSCULAR; SOFT TISSUE
Refills: 0 | DISCHARGE

## 2025-04-17 RX ORDER — INSULIN LISPRO 100 U/ML
INJECTION, SOLUTION INTRAVENOUS; SUBCUTANEOUS
Refills: 0 | Status: DISCONTINUED | OUTPATIENT
Start: 2025-04-17 | End: 2025-04-17

## 2025-04-17 RX ORDER — MINERAL OIL
30 OIL (ML) MISCELLANEOUS DAILY
Refills: 0 | Status: DISCONTINUED | OUTPATIENT
Start: 2025-04-17 | End: 2025-04-24

## 2025-04-17 RX ORDER — IPRATROPIUM BROMIDE 42 UG/1
0 SPRAY NASAL
Refills: 0 | DISCHARGE

## 2025-04-17 RX ORDER — IPRATROPIUM BROMIDE 42 UG/1
2 SPRAY NASAL
Refills: 0 | DISCHARGE

## 2025-04-17 RX ORDER — LEVETIRACETAM 10 MG/ML
1000 INJECTION, SOLUTION INTRAVENOUS
Refills: 0 | Status: DISCONTINUED | OUTPATIENT
Start: 2025-04-17 | End: 2025-04-24

## 2025-04-17 RX ORDER — B1/B2/B3/B5/B6/B12/VIT C/FOLIC 500-0.5 MG
1 TABLET ORAL DAILY
Refills: 0 | Status: DISCONTINUED | OUTPATIENT
Start: 2025-04-17 | End: 2025-04-24

## 2025-04-17 RX ORDER — IPRATROPIUM BROMIDE AND ALBUTEROL SULFATE .5; 2.5 MG/3ML; MG/3ML
3 SOLUTION RESPIRATORY (INHALATION) EVERY 6 HOURS
Refills: 0 | Status: DISCONTINUED | OUTPATIENT
Start: 2025-04-17 | End: 2025-04-24

## 2025-04-17 RX ORDER — IPRATROPIUM BROMIDE AND ALBUTEROL SULFATE .5; 2.5 MG/3ML; MG/3ML
3 SOLUTION RESPIRATORY (INHALATION) ONCE
Refills: 0 | Status: COMPLETED | OUTPATIENT
Start: 2025-04-17 | End: 2025-04-17

## 2025-04-17 RX ORDER — B1/B2/B3/B5/B6/B12/VIT C/FOLIC 500-0.5 MG
1 TABLET ORAL
Refills: 0 | DISCHARGE

## 2025-04-17 RX ORDER — SENNA 187 MG
2 TABLET ORAL AT BEDTIME
Refills: 0 | Status: DISCONTINUED | OUTPATIENT
Start: 2025-04-17 | End: 2025-04-24

## 2025-04-17 RX ORDER — INSULIN LISPRO 100 U/ML
INJECTION, SOLUTION INTRAVENOUS; SUBCUTANEOUS AT BEDTIME
Refills: 0 | Status: DISCONTINUED | OUTPATIENT
Start: 2025-04-17 | End: 2025-04-17

## 2025-04-17 RX ORDER — METHYLPREDNISOLONE ACETATE 80 MG/ML
8 INJECTION, SUSPENSION INTRA-ARTICULAR; INTRALESIONAL; INTRAMUSCULAR; SOFT TISSUE DAILY
Refills: 0 | Status: DISCONTINUED | OUTPATIENT
Start: 2025-04-17 | End: 2025-04-17

## 2025-04-17 RX ORDER — NIFEDIPINE 30 MG
30 TABLET, EXTENDED RELEASE 24 HR ORAL DAILY
Refills: 0 | Status: DISCONTINUED | OUTPATIENT
Start: 2025-04-17 | End: 2025-04-18

## 2025-04-17 RX ORDER — POLYETHYLENE GLYCOL 3350 17 G/17G
17 POWDER, FOR SOLUTION ORAL DAILY
Refills: 0 | Status: DISCONTINUED | OUTPATIENT
Start: 2025-04-17 | End: 2025-04-24

## 2025-04-17 RX ORDER — ERTAPENEM SODIUM 1 G/1
1000 INJECTION, POWDER, LYOPHILIZED, FOR SOLUTION INTRAMUSCULAR; INTRAVENOUS EVERY 24 HOURS
Refills: 0 | Status: DISCONTINUED | OUTPATIENT
Start: 2025-04-18 | End: 2025-04-18

## 2025-04-17 RX ORDER — TIOTROPIUM BROMIDE INHALATION SPRAY 3.12 UG/1
2 SPRAY, METERED RESPIRATORY (INHALATION) DAILY
Refills: 0 | Status: DISCONTINUED | OUTPATIENT
Start: 2025-04-17 | End: 2025-04-24

## 2025-04-17 RX ORDER — ROSUVASTATIN CALCIUM 20 MG/1
5 TABLET, FILM COATED ORAL AT BEDTIME
Refills: 0 | Status: DISCONTINUED | OUTPATIENT
Start: 2025-04-17 | End: 2025-04-24

## 2025-04-17 RX ORDER — CLOPIDOGREL BISULFATE 75 MG/1
75 TABLET, FILM COATED ORAL DAILY
Refills: 0 | Status: DISCONTINUED | OUTPATIENT
Start: 2025-04-17 | End: 2025-04-20

## 2025-04-17 RX ORDER — GLUCAGON 3 MG/1
1 POWDER NASAL ONCE
Refills: 0 | Status: DISCONTINUED | OUTPATIENT
Start: 2025-04-17 | End: 2025-04-24

## 2025-04-17 RX ORDER — GLYCOPYRROLATE 0.2 MG/ML
1 INJECTION INTRAMUSCULAR; INTRAVENOUS THREE TIMES A DAY
Refills: 0 | Status: DISCONTINUED | OUTPATIENT
Start: 2025-04-17 | End: 2025-04-24

## 2025-04-17 RX ORDER — METHYLPREDNISOLONE ACETATE 80 MG/ML
1 INJECTION, SUSPENSION INTRA-ARTICULAR; INTRALESIONAL; INTRAMUSCULAR; SOFT TISSUE
Refills: 0 | DISCHARGE

## 2025-04-17 RX ORDER — BUDESONIDE 0.25 MG/2ML
1 SUSPENSION RESPIRATORY (INHALATION) EVERY 12 HOURS
Refills: 0 | Status: DISCONTINUED | OUTPATIENT
Start: 2025-04-17 | End: 2025-04-24

## 2025-04-17 RX ORDER — FERROUS SULFATE 137(45) MG
325 TABLET, EXTENDED RELEASE ORAL DAILY
Refills: 0 | Status: DISCONTINUED | OUTPATIENT
Start: 2025-04-17 | End: 2025-04-24

## 2025-04-17 RX ORDER — INSULIN LISPRO 100 U/ML
INJECTION, SOLUTION INTRAVENOUS; SUBCUTANEOUS
Refills: 0 | Status: DISCONTINUED | OUTPATIENT
Start: 2025-04-17 | End: 2025-04-21

## 2025-04-17 RX ORDER — LABETALOL HYDROCHLORIDE 200 MG/1
100 TABLET, FILM COATED ORAL EVERY 8 HOURS
Refills: 0 | Status: DISCONTINUED | OUTPATIENT
Start: 2025-04-17 | End: 2025-04-24

## 2025-04-17 RX ORDER — LACOSAMIDE 150 MG/1
100 TABLET, FILM COATED ORAL
Refills: 0 | Status: DISCONTINUED | OUTPATIENT
Start: 2025-04-17 | End: 2025-04-24

## 2025-04-17 RX ORDER — MONTELUKAST SODIUM 10 MG/1
10 TABLET ORAL AT BEDTIME
Refills: 0 | Status: DISCONTINUED | OUTPATIENT
Start: 2025-04-17 | End: 2025-04-24

## 2025-04-17 RX ORDER — DIPHENHYDRAMINE HCL 12.5MG/5ML
50 ELIXIR ORAL DAILY
Refills: 0 | Status: DISCONTINUED | OUTPATIENT
Start: 2025-04-18 | End: 2025-04-18

## 2025-04-17 RX ORDER — METHYLPREDNISOLONE ACETATE 80 MG/ML
40 INJECTION, SUSPENSION INTRA-ARTICULAR; INTRALESIONAL; INTRAMUSCULAR; SOFT TISSUE EVERY 8 HOURS
Refills: 0 | Status: DISCONTINUED | OUTPATIENT
Start: 2025-04-17 | End: 2025-04-17

## 2025-04-17 RX ORDER — SODIUM CHLORIDE 9 G/1000ML
1000 INJECTION, SOLUTION INTRAVENOUS
Refills: 0 | Status: DISCONTINUED | OUTPATIENT
Start: 2025-04-17 | End: 2025-04-24

## 2025-04-17 RX ORDER — APIXABAN 2.5 MG/1
5 TABLET, FILM COATED ORAL EVERY 12 HOURS
Refills: 0 | Status: DISCONTINUED | OUTPATIENT
Start: 2025-04-17 | End: 2025-04-20

## 2025-04-17 RX ORDER — METHYLPREDNISOLONE ACETATE 80 MG/ML
60 INJECTION, SUSPENSION INTRA-ARTICULAR; INTRALESIONAL; INTRAMUSCULAR; SOFT TISSUE DAILY
Refills: 0 | Status: DISCONTINUED | OUTPATIENT
Start: 2025-04-17 | End: 2025-04-18

## 2025-04-17 RX ORDER — DIPHENHYDRAMINE HCL 12.5MG/5ML
50 ELIXIR ORAL ONCE
Refills: 0 | Status: COMPLETED | OUTPATIENT
Start: 2025-04-17 | End: 2025-04-17

## 2025-04-17 RX ORDER — DEXTROSE 50 % IN WATER 50 %
12.5 SYRINGE (ML) INTRAVENOUS ONCE
Refills: 0 | Status: DISCONTINUED | OUTPATIENT
Start: 2025-04-17 | End: 2025-04-24

## 2025-04-17 RX ORDER — FLUTICASONE PROPIONATE 50 UG/1
1 SPRAY, METERED NASAL
Refills: 0 | DISCHARGE

## 2025-04-17 RX ORDER — INSULIN LISPRO 100 U/ML
INJECTION, SOLUTION INTRAVENOUS; SUBCUTANEOUS AT BEDTIME
Refills: 0 | Status: DISCONTINUED | OUTPATIENT
Start: 2025-04-17 | End: 2025-04-21

## 2025-04-17 RX ORDER — INSULIN GLARGINE-YFGN 100 [IU]/ML
26 INJECTION, SOLUTION SUBCUTANEOUS AT BEDTIME
Refills: 0 | Status: DISCONTINUED | OUTPATIENT
Start: 2025-04-17 | End: 2025-04-17

## 2025-04-17 RX ORDER — MEGESTROL ACETATE 40 MG
20 TABLET ORAL DAILY
Refills: 0 | Status: DISCONTINUED | OUTPATIENT
Start: 2025-04-17 | End: 2025-04-24

## 2025-04-17 RX ORDER — ERTAPENEM SODIUM 1 G/1
1000 INJECTION, POWDER, LYOPHILIZED, FOR SOLUTION INTRAMUSCULAR; INTRAVENOUS ONCE
Refills: 0 | Status: COMPLETED | OUTPATIENT
Start: 2025-04-17 | End: 2025-04-17

## 2025-04-17 RX ORDER — INSULIN GLARGINE-YFGN 100 [IU]/ML
22 INJECTION, SOLUTION SUBCUTANEOUS EVERY MORNING
Refills: 0 | Status: DISCONTINUED | OUTPATIENT
Start: 2025-04-17 | End: 2025-04-20

## 2025-04-17 RX ADMIN — MONTELUKAST SODIUM 10 MILLIGRAM(S): 10 TABLET ORAL at 22:21

## 2025-04-17 RX ADMIN — INSULIN LISPRO 6: 100 INJECTION, SOLUTION INTRAVENOUS; SUBCUTANEOUS at 20:17

## 2025-04-17 RX ADMIN — LACOSAMIDE 100 MILLIGRAM(S): 150 TABLET, FILM COATED ORAL at 22:22

## 2025-04-17 RX ADMIN — LEVETIRACETAM 1000 MILLIGRAM(S): 10 INJECTION, SOLUTION INTRAVENOUS at 22:21

## 2025-04-17 RX ADMIN — Medication 2 TABLET(S): at 22:22

## 2025-04-17 RX ADMIN — Medication 50 MILLIGRAM(S): at 18:45

## 2025-04-17 RX ADMIN — LABETALOL HYDROCHLORIDE 100 MILLIGRAM(S): 200 TABLET, FILM COATED ORAL at 22:22

## 2025-04-17 RX ADMIN — ERTAPENEM SODIUM 100 MILLIGRAM(S): 1 INJECTION, POWDER, LYOPHILIZED, FOR SOLUTION INTRAMUSCULAR; INTRAVENOUS at 18:45

## 2025-04-17 RX ADMIN — IPRATROPIUM BROMIDE AND ALBUTEROL SULFATE 3 MILLILITER(S): .5; 2.5 SOLUTION RESPIRATORY (INHALATION) at 13:59

## 2025-04-17 RX ADMIN — MEXILETINE HYDROCHLORIDE 200 MILLIGRAM(S): 250 CAPSULE ORAL at 22:21

## 2025-04-17 RX ADMIN — GLYCOPYRROLATE 1 MILLIGRAM(S): 0.2 INJECTION INTRAMUSCULAR; INTRAVENOUS at 15:55

## 2025-04-17 RX ADMIN — INSULIN GLARGINE-YFGN 28 UNIT(S): 100 INJECTION, SOLUTION SUBCUTANEOUS at 22:22

## 2025-04-17 RX ADMIN — Medication 4 MILLILITER(S): at 22:24

## 2025-04-17 RX ADMIN — INSULIN LISPRO 2: 100 INJECTION, SOLUTION INTRAVENOUS; SUBCUTANEOUS at 22:23

## 2025-04-17 RX ADMIN — IPRATROPIUM BROMIDE AND ALBUTEROL SULFATE 3 MILLILITER(S): .5; 2.5 SOLUTION RESPIRATORY (INHALATION) at 14:38

## 2025-04-17 RX ADMIN — GLYCOPYRROLATE 1 MILLIGRAM(S): 0.2 INJECTION INTRAMUSCULAR; INTRAVENOUS at 22:24

## 2025-04-17 RX ADMIN — BUDESONIDE 1 MILLIGRAM(S): 0.25 SUSPENSION RESPIRATORY (INHALATION) at 23:10

## 2025-04-17 RX ADMIN — IPRATROPIUM BROMIDE AND ALBUTEROL SULFATE 3 MILLILITER(S): .5; 2.5 SOLUTION RESPIRATORY (INHALATION) at 23:10

## 2025-04-17 RX ADMIN — METHYLPREDNISOLONE ACETATE 40 MILLIGRAM(S): 80 INJECTION, SUSPENSION INTRA-ARTICULAR; INTRALESIONAL; INTRAMUSCULAR; SOFT TISSUE at 13:59

## 2025-04-17 RX ADMIN — Medication 1 DOSE(S): at 22:23

## 2025-04-17 RX ADMIN — ROSUVASTATIN CALCIUM 5 MILLIGRAM(S): 20 TABLET, FILM COATED ORAL at 22:22

## 2025-04-17 RX ADMIN — APIXABAN 5 MILLIGRAM(S): 2.5 TABLET, FILM COATED ORAL at 15:54

## 2025-04-17 RX ADMIN — IPRATROPIUM BROMIDE AND ALBUTEROL SULFATE 3 MILLILITER(S): .5; 2.5 SOLUTION RESPIRATORY (INHALATION) at 14:37

## 2025-04-17 NOTE — H&P ADULT - PROBLEM SELECTOR PLAN 2
- tachpechnic to RR of 30s, proBNP 3648 (baseline 1896 in Feb 2025)  -  CXR showed trace R pleural effusion.   - Received solumedrol 40 mg IV, duoneb x 3 in ED    Plan:   - c/w duoneb Q6 standing, Advair BID, Spiriva qd   - c/w budesonide 1mg Q12, montelukast 10 mg qd   - c/w methylprednisolone 8 mg qd (home meds)  - pt uses CPAP and VESE at home (advised pt's daughter to bring in from home)   - chest PT, hypertonic saline   -  - tachpechnic to RR of 30s, proBNP 3648 (baseline 1896 in Feb 2025)  -  CXR showed trace R pleural effusion.   - Received solumedrol 40 mg IV, duoneb x 3 in ED    Plan:   - c/w duoneb Q6 standing, Advair BID, Spiriva qd   - c/w budesonide 1mg Q12, montelukast 10 mg qd   - c/w methylprednisolone 60 IV qd   - pt uses CPAP and VESE at home (advised pt's daughter to bring in from home)   - chest PT, hypertonic saline   -

## 2025-04-17 NOTE — PATIENT PROFILE ADULT - FALL HARM RISK - HARM RISK INTERVENTIONS

## 2025-04-17 NOTE — H&P ADULT - PROBLEM SELECTOR PLAN 5
- pt reports having generalized body shaking when she had seizure in the past   - takes keppra 1000 mg BID, lacosamide 100 mg BID at home     Plan:   - c/w Keppra 1000 mg BID   - c/w lacosamide 100 mg BID - pt reports having generalized body shaking when she had seizure in the past   - takes keppra 1000 mg BID, lacosamide 100 mg BID at home     Plan:   - c/w Keppra 1000 mg BID   - c/w lacosamide 100 mg BID  - seizure precaution   - aspiration precaution

## 2025-04-17 NOTE — H&P ADULT - HISTORY OF PRESENT ILLNESS
74yFemale pmhx of Epilepsy on Keppra, COPD,, DM2  asthma on chronic steroids, bronchiectasis, tracheomalacia s/p tracheloplasty HTN, Hx of dissection of descending thoracic aorta, , hx of aortic aneurysm,  Type B 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  Evaluated by Dr. Antonio,  Type A dissection s/p bioAVR with Bental procedure (9/2022), severe AS s/p TAVR (9/10/2023), TIA's, DVT Afib on Eliquis, T2DM, Colon cancer S/P resection, Colostomy bag , neuropathy Midline for IV ABX placed 2 weeks ago for chronic "TB like" pneumonia on ertapenmen today is supposed to be the last dose at 6pm presenting today for worsening shortness of breath over the last 7 days.  Patient states that the symptoms are pretty typical of her asthmatic exacerbations less than last efficacy.  Today also endorsing having a syncopal episode.  Patient was walking to the bathroom got lightheaded fell to the ground denies hitting her head was able to get up on her own accord.  No other complaints of pain at this time.  Called 911.  Denies chest pain palpitations.  Shortness of breath worse with exertion nothing makes it better.  No belly pain nausea vomiting diarrhea.  No other complaints at this time positive cough no fevers no chills endorsing taking all of her medications as prescribed.  Has been using her inhalers with.    ED Course: bradycardic in 40s, on 4L of NC, tachpechnic to RR of 30s. Labs showed mild hyperkalemia 5.5 (hemolyzed), glucose 338, proBNP 3648 (baseline 1896 in Feb 2025), CXR showed trace R pleural effusion.   Received solumedrol 40 mg IV, duoneb x 3 in ED.  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 Antonio, Type A dissection s/p bioAVR with Bental procedure (9/2022), severe AS s/p TAVR (9/10/2023), TIA's, DVT, Afib on Eliquis,  Colon cancer S/P resection, colostomy bag, neuropathy, Midline for IV ABX placed 2 weeks ago for chronic "TB like" pneumonia on ertapenmen (today is supposed to be the last dose at 6pm) presenting today for worsening shortness of breath over the last 7 days. Per daughter, pt does not use inhaler Q4 as instructed and when it happens, pt develops SOB even with just 5 meters of walking. Pt states that she has fall 3-4 times in the last month, with last episode this morning 5AM when she was walking to the bathroom. She felt like her legs were giving up, denies any dizziness, headstrike, injury to any body parts, LOC, CP, palpitation. She also endorse intermittent new onset visual hallucination (seeing  and sister when they are not there) starting Friday 4/11. She reports having chronic cough due to asthma but has also been having burning on urination. Denies fever, recent illness, abd pain, n/v/d.       ED Course: bradycardic in 40s, on 4L of NC, tachpechnic to RR of 30s. Labs showed mild hyperkalemia 5.5 (hemolyzed), glucose 338, proBNP 3648 (baseline 1896 in Feb 2025), CXR showed trace R pleural effusion.   Received solumedrol 40 mg IV, duoneb x 3 in ED.

## 2025-04-17 NOTE — ED PROVIDER NOTE - NSICDXPASTSURGICALHX_GEN_ALL_CORE_FT
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    H/O pelvic surgery 5 years ago - s/p fracture    H/O total knee replacement, bilateral 5 years ago    History of partial hysterectomy 30 years ago - fibroids    History of sinus surgery multiple sinus surgeries    History of tracheomalacia 2015 - attempted tracheal stenting ([facility])- course complicated by obstruction, respiratory failure, multiple CPR attempts -  stent discontinued; 10/20/2016 Tracheobronchoplasty (Prolene Mesh) performed at [facility] by Dr Zapien    Rectal bleeding exam under anesthesia ([facility]) 2/2018    S/P aortic valve replacement     S/P AVR (aortic valve replacement)     S/P bronchoscopy 6/5/2018 - [place] (Dr Zapien) no evidence of tracheobronchomalacia in trachea or bronchial tubes    S/P colostomy     S/P TAVR (transcatheter aortic valve replacement)

## 2025-04-17 NOTE — H&P ADULT - NSICDXPASTSURGICALHX_GEN_ALL_CORE_FT
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    H/O pelvic surgery 5 years ago - s/p fracture    H/O total knee replacement, bilateral 5 years ago    History of partial hysterectomy 30 years ago - fibroids    History of sinus surgery multiple sinus surgeries    History of tracheomalacia 2015 - attempted tracheal stenting ([facility])- course complicated by obstruction, respiratory failure, multiple CPR attempts -  stent discontinued; 10/20/2016 Tracheobronchoplasty (Prolene Mesh) performed at [facility] by Dr Zapien    Rectal bleeding exam under anesthesia ([facility]) 2/2018    S/P aortic valve replacement     S/P AVR (aortic valve replacement)     S/P bronchoscopy 6/5/2018 - [place] (Dr Zapien) no evidence of tracheobronchomalacia in trachea or bronchial tubes    S/P colostomy     S/P TAVR (transcatheter aortic valve replacement)

## 2025-04-17 NOTE — H&P ADULT - ASSESSMENT
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 Antonio, Type A dissection s/p bioAVR with Bental procedure (9/2022), severe AS s/p TAVR (9/10/2023), TIA's, DVT, Afib on Eliquis,  Colon cancer S/P resection, colostomy bag, neuropathy, Midline for IV ABX placed 2 weeks ago for chronic "TB like" pneumonia on ertapenmen (today is supposed to be the last dose at 6pm) presenting today for worsening shortness of breath over the last 7 days. multiple falls, urinary burning, visual hallucination, admitted for further management of asthma/COPD exacerbation, and further eval of new onset visual hallucination.

## 2025-04-17 NOTE — ED ADULT NURSE NOTE - OBJECTIVE STATEMENT
74Y Female, A&Ox4,  pmhx of Epilepsy on Keppra, COPD,, DM2  asthma on chronic steroids, bronchiectasis, tracheomalacia s/p tracheloplasty HTN, Hx of dissection of descending thoracic aorta, , hx of aortic aneurysm,  Type B 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  Evaluated by Dr. Antonio,  Type A dissection s/p bioAVR with Bental procedure (9/2022), severe AS s/p TAVR (9/10/2023), TIA's, DVT Afib on Eliquis, T2DM, Colon cancer S/P resection, Colostomy bag , neuropathy Midline for IV ABX placed 2 weeks ago for chronic "TB like" pneumonia on ertapenmen today is supposed to be the last dose at 6pm presenting today for worsening shortness of breath over the last 7 days.  Patient states that the symptoms are pretty typical of her asthmatic exacerbations less than last efficacy.  Today also endorsing having a syncopal episode.  Patient was walking to the bathroom got lightheaded fell to the ground denies hitting her head was able to get up on her own accord.  No other complaints of pain at this time.  Called 911.  Denies chest pain palpitations.  Shortness of breath worse with exertion nothing makes it better.  No belly pain nausea vomiting diarrhea.  No other complaints at this time positive cough no fevers no chills endorsing taking all of her medications as prescribed.

## 2025-04-17 NOTE — H&P ADULT - PROBLEM SELECTOR PLAN 3
- per pt, she has been on Ertapenem for TB like PNA, last dose should be today per daughter     Plan:   - c/w Ertapenem   - give benadryl 50 mg PO prior to Ertapenem - per pt, she has been on Ertapenem for TB like PNA, last dose should be today per daughter     Plan:   - c/w Ertapenem qd for now   - check sputum Cx   - give benadryl 50 mg PO prior to Ertapenem  - ID c/s in AM   - pulm c/s in AM

## 2025-04-17 NOTE — H&P ADULT - NSHPLABSRESULTS_GEN_ALL_CORE
.  LABS:                         11.0   8.83  )-----------( 292      ( 2025 14:06 )             35.7         138  |  101  |  17  ----------------------------<  338[H]  5.5[H]   |  23  |  0.67    Ca    9.3      2025 14:06  Mg     2.2         TPro  6.8  /  Alb  3.7  /  TBili  0.4  /  DBili  x   /  AST  45[H]  /  ALT  44  /  AlkPhos  92        Urinalysis Basic - ( 2025 17:19 )    Color: Yellow / Appearance: Clear / S.026 / pH: x  Gluc: x / Ketone: Trace mg/dL  / Bili: Negative / Urobili: 0.2 mg/dL   Blood: x / Protein: Trace mg/dL / Nitrite: Negative   Leuk Esterase: Small / RBC: 5 /HPF / WBC 20 /HPF   Sq Epi: x / Non Sq Epi: 3 /HPF / Bacteria: Negative /HPF                RADIOLOGY, EKG & ADDITIONAL TESTS: Reviewed.

## 2025-04-17 NOTE — H&P ADULT - PROBLEM SELECTOR PLAN 11
- severe AS s/p TAVR (9/10/2023), Type A dissection s/p bioAVR with Bental procedure (9/2022)    Plan:   - CTM   - c/w plavix

## 2025-04-17 NOTE — H&P ADULT - PROBLEM SELECTOR PLAN 4
- pt presented with multiple falls in the past month     Plan:   - f/u infectious work up   - PT consult   - nutrition consult - pt presented with multiple falls in the past month     Plan:   - f/u infectious work up as mentioned above   - PT consult   - nutrition consult  - check orthostatic VS

## 2025-04-17 NOTE — H&P ADULT - NSHPPHYSICALEXAM_GEN_ALL_CORE
GENERAL: tachypneic  HEAD:  Atraumatic, normocephalic  EYES: conjunctiva and sclera clear, +exophthalmos  HEART: Regular rate and rhythm, bradycardic   LUNGS: prolonged expiratory phase with wheezing   ABDOMEN: Soft, nontender, nondistended  EXTREMITIES: ecchymosis over LE and UE b/l   NERVOUS SYSTEM:  A&Ox3 but can be confused about time GENERAL: tachypneic, speaking in full sentences  HEAD:  Atraumatic, normocephalic  EYES: conjunctiva and sclera clear, +exophthalmos  HEART: Regular rate and rhythm, bradycardic   LUNGS: prolonged expiratory phase with wheezing   ABDOMEN: Soft, nontender, nondistended  EXTREMITIES: ecchymosis over LE and UE b/l   NERVOUS SYSTEM:  A&Ox3 but can be confused about time

## 2025-04-17 NOTE — H&P ADULT - PROBLEM SELECTOR PLAN 8
- Pt takes lantus 22 in AM, 28 at night    Plan:   - check A1C   - reduced dose to 75%, titrate up as needed with low ISS   - FS premeal and qhs   - nutrition c/s - Pt takes lantus 22 in AM, 28 at night    Plan:   - check A1C   - place pt back at home dose given likely will have hyperglycemia from steroid - lantus 22 in AM and 28 at night   - FS premeal and qhs   - nutrition c/s

## 2025-04-17 NOTE — ED ADULT NURSE NOTE - NSICDXPASTSURGICALHX_GEN_ALL_CORE_FT
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    H/O pelvic surgery 5 years ago - s/p fracture    H/O total knee replacement, bilateral 5 years ago    History of partial hysterectomy 30 years ago - fibroids    History of sinus surgery multiple sinus surgeries    History of tracheomalacia 2015 - attempted tracheal stenting ([facility])- course complicated by obstruction, respiratory failure, multiple CPR attempts -  stent discontinued; 10/20/2016 Tracheobronchoplasty (Prolene Mesh) performed at [facility] by Dr Zapien    Rectal bleeding exam under anesthesia ([facility]) 2/2018    S/P aortic valve replacement     S/P AVR (aortic valve replacement)     S/P bronchoscopy 6/5/2018 - [place] (Dr Zapien) no evidence of tracheobronchomalacia in trachea or bronchial tubes    S/P colostomy     S/P TAVR (transcatheter aortic valve replacement)

## 2025-04-17 NOTE — H&P ADULT - PROBLEM SELECTOR PLAN 6
- home meds: eliquis 5mg BID     Plan:   - c/w eliquis 5 mg BID  - c/w labetalol 100 mg TID - home meds: eliquis 5mg BID     Plan:   - c/w eliquis 5 mg BID  - c/w labetalol 100 mg TID  - c/w mexiletine 200 mg TID

## 2025-04-17 NOTE — ED PROVIDER NOTE - ATTENDING APP SHARED VISIT CONTRIBUTION OF CARE
Attending Gaudencio: I performed a face to face evaluation of the patient and obtained a history as well as performed a physical exam. I have discussed their management with the ARTURO. I have reviewed the ARTURO note and agree with the documented findings and plan of care, except as noted. This was a shared visit with an ARTURO. I reviewed and verified the documentation and independently performed my own history/exam/medical decision making. My medical decision making and observations are found above. Please refer to any progress notes for updates on clinical course. My notes supersedes the above ARTURO note in case of discrepancy

## 2025-04-17 NOTE — H&P ADULT - PROBLEM SELECTOR PLAN 1
- pt reports she had generalized body shaking when she was diagnosed with seizure   - visual hallucination "seeing sister and  when they are not there" is a new occurrence that started last Friday, denies auditory hallucination     Plan:   - send infectious workup to r/o infectious etiology of hallucination   - f/u BCx, UCx, procalcitonin   - CTM - pt reports she had generalized body shaking when she was diagnosed with seizure   - visual hallucination "seeing sister and  when they are not there" is a new occurrence that started last Friday, denies auditory hallucination   - pt is AOX3 (at baseline mental status) with no other mood/psychotic symptoms and hallucination is different from prior seizure symptoms, lower concern for psychiatric or seizure etiology for this new onset visual hallucination     Plan:   - send infectious workup to r/o infectious etiology of hallucination   - f/u BCx, UCx, procalcitonin   - pending MRI brain w/wo   - CTM

## 2025-04-17 NOTE — ED PROVIDER NOTE - CLINICAL SUMMARY MEDICAL DECISION MAKING FREE TEXT BOX
Attending Gaudencio: 73 y/o F w/ PMH of COPD, Asthma, DM, epilepsy, chronic PNA on ertapenem currently, HTN, bronchiectasis, aortic dissection s/p repair, A fib on liquis, colon cancer s/p resection w/ colostomy formation presenting w/ worsening SOB. Seen w/ family. Reports over the past week has been feeling increasingly short of breath. Feels like this is her asthma flaring up and symptoms have persisted despite use of her inhalers. Reports today while walking to the bathroom felt dizzy and lost consciousness. Reports leaning up against the wall and sliding down. Denies head strike. Was able to get herself up unassisted. Denies head/neck/back pain. Denies fevers, chills, headache, dizziness, blurred vision, chest pain, cough, abdominal pain, n/v/d/c, urinary symptoms, MSK pain, rash. Pt overall no acute distress. Head NCAT. Lungs w/ decreased air entry b/l, scattered exp wheeze. HR regular. Abd nondistended/soft/nontender. No CVA tenderness. Non focal neuro exam. Calm and cooperative. Pt here w/ worsening SOB and syncope. Eval for asthma/COPD exacerbation. Eval for PNA. Eval for viral URI. Eval for ACS/CHF. Suspect will require admission. Plan for labs, imaging, meds, EKG. Will reassess the need for additional interventions as clinically warranted. Refer to any progress notes for updates on clinical course and as a continuation of this MDM.     I, Dr. Valente Ratliff, independently interpreted the EKG which showed bradycardic rhythm at a rate of 45, QTc of 446.

## 2025-04-17 NOTE — H&P ADULT - PROBLEM SELECTOR PLAN 10
- Hx of dissection of descending thoracic aorta, , hx of aortic aneurysm,  Type B dissection (7/2024), medically managed initially as she did not meet criteria for surgery at that time), Type A dissection s/p bioAVR with Bental procedure (9/2022)    Plan:   - CTM - Hx of dissection of descending thoracic aorta, , hx of aortic aneurysm,  Type B dissection (7/2024), medically managed initially as she did not meet criteria for surgery at that time), Type A dissection s/p bioAVR with Bental procedure (9/2022)    Plan:   - c/w labetalol 100 mg TID   - strict BP control

## 2025-04-17 NOTE — H&P ADULT - ATTENDING COMMENTS
74F with complex medical history including severe steroid-dependent asthma, bronchiectasis, COPD, tracheomalacia s/p tracheoplasty, Type A dissection s/p bioAVR with Bental procedure (9/2022), severe AS s/p TAVR (9/10/2023), TIAs, DVT, afib on Eliquis, colon cancer S/P resection + colostomy, recently placed on ertapenem for ESBL proteus in sputum cx p/w worsening shortness of breath over the last 7 days, as well as multiple falls in the last month, and new onset visual hallucinations.     # Asthma/bronchiectasis exacerbation  - in setting of  recent ESBL proteus infection, DIEUDONNE, yeast/mold in sputum cx  - hold home steroids; start solumedrol 60 mg daily  - c/w ertapenem (today is her last dose), premedicate with benadryl  - ID and pulm consult in AM    # Hallucinations  - unclear etiology, no sign of psychosis and pt is linear in thought (although per daughter is not the best historian and confuses timeline of events)  - MRI head to r/o structural pathology silas given recent multiple falls and history of TIAs  - prior MRI head reviewed 11/2023 with moderately severe chronic white matter microvascular type changes  - thyroid studies    # Multiple falls  - PT evaluation  - MR as above  - orthostatics    # Type A dissection s/p repair  - strict BP control  - c/w home  bp meds    Rest of plan as above. D/w HS.    The necessity of the time spent during the encounter on this date of service was due to:   - Ordering, reviewing, and interpreting labs, testing, and imaging  - Independently obtaining a review of systems and performing a physical exam  - Reviewing prior hospitalization and where necessary, outpatient records  - Reviewing consultant recommendations/communicating with consultants  - Counselling and educating patient and family regarding interpretation of aforementioned items and plan of care  - Time does not include teaching    Time-based billing (NON-critical care). Total minutes spent: 100

## 2025-04-17 NOTE — ED PROVIDER NOTE - OBJECTIVE STATEMENT
74yFemale pmhx of Epilepsy on Keppra, COPD,, DM2  asthma on chronic steroids, bronchiectasis, tracheomalacia s/p tracheloplasty HTN, Hx of dissection of descending thoracic aorta, , hx of aortic aneurysm,  Type B 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  Evaluated by Dr. Antonio,  Type A dissection s/p bioAVR with Bental procedure (9/2022), severe AS s/p TAVR (9/10/2023), TIA's, DVT Afib on Eliquis, T2DM, Colon cancer S/P resection, Colostomy bag , neuropathy Midline for IV ABX placed 2 weeks ago presenting today for worsening shortness of breath over the last 7 days.  Patient states that the symptoms are pretty typical of her asthmatic exacerbations less than last efficacy.  Today also endorsing having a syncopal episode.  Patient was walking to the bathroom got lightheaded fell to the ground denies hitting her head was able to get up on her own accord.  No other complaints of pain at this time.  Called 911.  Denies chest pain palpitations.  Shortness of breath worse with exertion nothing makes it better.  No belly pain nausea vomiting diarrhea.  No other complaints at this time positive cough no fevers no chills endorsing taking all of her medications as prescribed.  Has been using her inhalers with. 74yFemale pmhx of Epilepsy on Keppra, COPD,, DM2  asthma on chronic steroids, bronchiectasis, tracheomalacia s/p tracheloplasty HTN, Hx of dissection of descending thoracic aorta, , hx of aortic aneurysm,  Type B 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  Evaluated by Dr. Antonio,  Type A dissection s/p bioAVR with Bental procedure (9/2022), severe AS s/p TAVR (9/10/2023), TIA's, DVT Afib on Eliquis, T2DM, Colon cancer S/P resection, Colostomy bag , neuropathy Midline for IV ABX placed 2 weeks ago for chronic "TB like" pneumonia on ertapenmen today is supposed to be the last dose at 6pm presenting today for worsening shortness of breath over the last 7 days.  Patient states that the symptoms are pretty typical of her asthmatic exacerbations less than last efficacy.  Today also endorsing having a syncopal episode.  Patient was walking to the bathroom got lightheaded fell to the ground denies hitting her head was able to get up on her own accord.  No other complaints of pain at this time.  Called 911.  Denies chest pain palpitations.  Shortness of breath worse with exertion nothing makes it better.  No belly pain nausea vomiting diarrhea.  No other complaints at this time positive cough no fevers no chills endorsing taking all of her medications as prescribed.  Has been using her inhalers with.

## 2025-04-18 LAB
A1C WITH ESTIMATED AVERAGE GLUCOSE RESULT: 8 % — HIGH (ref 4–5.6)
ALBUMIN SERPL ELPH-MCNC: 3.6 G/DL — SIGNIFICANT CHANGE UP (ref 3.3–5)
ALP SERPL-CCNC: 90 U/L — SIGNIFICANT CHANGE UP (ref 40–120)
ALT FLD-CCNC: 31 U/L — SIGNIFICANT CHANGE UP (ref 10–45)
ANION GAP SERPL CALC-SCNC: 11 MMOL/L — SIGNIFICANT CHANGE UP (ref 5–17)
AST SERPL-CCNC: 19 U/L — SIGNIFICANT CHANGE UP (ref 10–40)
BASOPHILS # BLD AUTO: 0.02 K/UL — SIGNIFICANT CHANGE UP (ref 0–0.2)
BASOPHILS NFR BLD AUTO: 0.2 % — SIGNIFICANT CHANGE UP (ref 0–2)
BILIRUB SERPL-MCNC: 0.2 MG/DL — SIGNIFICANT CHANGE UP (ref 0.2–1.2)
BUN SERPL-MCNC: 16 MG/DL — SIGNIFICANT CHANGE UP (ref 7–23)
CALCIUM SERPL-MCNC: 9.1 MG/DL — SIGNIFICANT CHANGE UP (ref 8.4–10.5)
CHLORIDE SERPL-SCNC: 104 MMOL/L — SIGNIFICANT CHANGE UP (ref 96–108)
CHOLEST SERPL-MCNC: 124 MG/DL — SIGNIFICANT CHANGE UP
CO2 SERPL-SCNC: 23 MMOL/L — SIGNIFICANT CHANGE UP (ref 22–31)
CREAT SERPL-MCNC: 0.58 MG/DL — SIGNIFICANT CHANGE UP (ref 0.5–1.3)
CULTURE RESULTS: SIGNIFICANT CHANGE UP
EGFR: 94 ML/MIN/1.73M2 — SIGNIFICANT CHANGE UP
EGFR: 94 ML/MIN/1.73M2 — SIGNIFICANT CHANGE UP
EOSINOPHIL # BLD AUTO: 0.01 K/UL — SIGNIFICANT CHANGE UP (ref 0–0.5)
EOSINOPHIL NFR BLD AUTO: 0.1 % — SIGNIFICANT CHANGE UP (ref 0–6)
ESTIMATED AVERAGE GLUCOSE: 183 MG/DL — HIGH (ref 68–114)
FLUAV AG NPH QL: SIGNIFICANT CHANGE UP
FLUBV AG NPH QL: SIGNIFICANT CHANGE UP
GAS PNL BLDV: SIGNIFICANT CHANGE UP
GLUCOSE BLDC GLUCOMTR-MCNC: 146 MG/DL — HIGH (ref 70–99)
GLUCOSE BLDC GLUCOMTR-MCNC: 197 MG/DL — HIGH (ref 70–99)
GLUCOSE BLDC GLUCOMTR-MCNC: 206 MG/DL — HIGH (ref 70–99)
GLUCOSE BLDC GLUCOMTR-MCNC: 219 MG/DL — HIGH (ref 70–99)
GLUCOSE BLDC GLUCOMTR-MCNC: 228 MG/DL — HIGH (ref 70–99)
GLUCOSE BLDC GLUCOMTR-MCNC: 321 MG/DL — HIGH (ref 70–99)
GLUCOSE BLDC GLUCOMTR-MCNC: 327 MG/DL — HIGH (ref 70–99)
GLUCOSE SERPL-MCNC: 250 MG/DL — HIGH (ref 70–99)
GRAM STN FLD: ABNORMAL
HCT VFR BLD CALC: 35.2 % — SIGNIFICANT CHANGE UP (ref 34.5–45)
HDLC SERPL-MCNC: 54 MG/DL — SIGNIFICANT CHANGE UP
HGB BLD-MCNC: 10.9 G/DL — LOW (ref 11.5–15.5)
IMM GRANULOCYTES NFR BLD AUTO: 0.5 % — SIGNIFICANT CHANGE UP (ref 0–0.9)
LDLC SERPL-MCNC: 56 MG/DL — SIGNIFICANT CHANGE UP
LIPID PNL WITH DIRECT LDL SERPL: 56 MG/DL — SIGNIFICANT CHANGE UP
LYMPHOCYTES # BLD AUTO: 0.64 K/UL — LOW (ref 1–3.3)
LYMPHOCYTES # BLD AUTO: 6.1 % — LOW (ref 13–44)
MAGNESIUM SERPL-MCNC: 2.2 MG/DL — SIGNIFICANT CHANGE UP (ref 1.6–2.6)
MCHC RBC-ENTMCNC: 24.1 PG — LOW (ref 27–34)
MCHC RBC-ENTMCNC: 31 G/DL — LOW (ref 32–36)
MCV RBC AUTO: 77.9 FL — LOW (ref 80–100)
MONOCYTES # BLD AUTO: 0.44 K/UL — SIGNIFICANT CHANGE UP (ref 0–0.9)
MONOCYTES NFR BLD AUTO: 4.2 % — SIGNIFICANT CHANGE UP (ref 2–14)
NEUTROPHILS # BLD AUTO: 9.39 K/UL — HIGH (ref 1.8–7.4)
NEUTROPHILS NFR BLD AUTO: 88.9 % — HIGH (ref 43–77)
NONHDLC SERPL-MCNC: 70 MG/DL — SIGNIFICANT CHANGE UP
NRBC BLD AUTO-RTO: 0 /100 WBCS — SIGNIFICANT CHANGE UP (ref 0–0)
PHOSPHATE SERPL-MCNC: 1.9 MG/DL — LOW (ref 2.5–4.5)
PLATELET # BLD AUTO: 284 K/UL — SIGNIFICANT CHANGE UP (ref 150–400)
POTASSIUM SERPL-MCNC: 4.8 MMOL/L — SIGNIFICANT CHANGE UP (ref 3.5–5.3)
POTASSIUM SERPL-SCNC: 4.8 MMOL/L — SIGNIFICANT CHANGE UP (ref 3.5–5.3)
PROT SERPL-MCNC: 6.4 G/DL — SIGNIFICANT CHANGE UP (ref 6–8.3)
RAPID RVP RESULT: SIGNIFICANT CHANGE UP
RBC # BLD: 4.52 M/UL — SIGNIFICANT CHANGE UP (ref 3.8–5.2)
RBC # FLD: 16.6 % — HIGH (ref 10.3–14.5)
RSV RNA NPH QL NAA+NON-PROBE: SIGNIFICANT CHANGE UP
SARS-COV-2 RNA SPEC QL NAA+PROBE: SIGNIFICANT CHANGE UP
SARS-COV-2 RNA SPEC QL NAA+PROBE: SIGNIFICANT CHANGE UP
SODIUM SERPL-SCNC: 138 MMOL/L — SIGNIFICANT CHANGE UP (ref 135–145)
SOURCE RESPIRATORY: SIGNIFICANT CHANGE UP
SPECIMEN SOURCE: SIGNIFICANT CHANGE UP
SPECIMEN SOURCE: SIGNIFICANT CHANGE UP
T4 FREE SERPL-MCNC: 1.4 NG/DL — SIGNIFICANT CHANGE UP (ref 0.9–1.8)
TRIGL SERPL-MCNC: 67 MG/DL — SIGNIFICANT CHANGE UP
TSH SERPL-MCNC: 0.62 UIU/ML — SIGNIFICANT CHANGE UP (ref 0.27–4.2)
WBC # BLD: 10.55 K/UL — HIGH (ref 3.8–10.5)
WBC # FLD AUTO: 10.55 K/UL — HIGH (ref 3.8–10.5)

## 2025-04-18 PROCEDURE — 99222 1ST HOSP IP/OBS MODERATE 55: CPT

## 2025-04-18 PROCEDURE — 99223 1ST HOSP IP/OBS HIGH 75: CPT

## 2025-04-18 PROCEDURE — 99233 SBSQ HOSP IP/OBS HIGH 50: CPT

## 2025-04-18 PROCEDURE — 70551 MRI BRAIN STEM W/O DYE: CPT | Mod: 26

## 2025-04-18 PROCEDURE — 93010 ELECTROCARDIOGRAM REPORT: CPT

## 2025-04-18 PROCEDURE — G0545: CPT

## 2025-04-18 RX ORDER — NIFEDIPINE 30 MG
60 TABLET, EXTENDED RELEASE 24 HR ORAL DAILY
Refills: 0 | Status: DISCONTINUED | OUTPATIENT
Start: 2025-04-18 | End: 2025-04-24

## 2025-04-18 RX ORDER — SODIUM PHOSPHATE,DIBASIC DIHYD
15 POWDER (GRAM) MISCELLANEOUS ONCE
Refills: 0 | Status: COMPLETED | OUTPATIENT
Start: 2025-04-18 | End: 2025-04-18

## 2025-04-18 RX ORDER — NIFEDIPINE 30 MG
60 TABLET, EXTENDED RELEASE 24 HR ORAL DAILY
Refills: 0 | Status: DISCONTINUED | OUTPATIENT
Start: 2025-04-18 | End: 2025-04-18

## 2025-04-18 RX ORDER — MAGNESIUM CITRATE
1 SOLUTION, ORAL ORAL
Refills: 0 | DISCHARGE

## 2025-04-18 RX ORDER — PREDNISONE 20 MG/1
40 TABLET ORAL DAILY
Refills: 0 | Status: DISCONTINUED | OUTPATIENT
Start: 2025-04-19 | End: 2025-04-21

## 2025-04-18 RX ORDER — FLUTICASONE PROPIONATE 50 UG/1
1 SPRAY, METERED NASAL
Refills: 0 | Status: DISCONTINUED | OUTPATIENT
Start: 2025-04-18 | End: 2025-04-19

## 2025-04-18 RX ORDER — MAGNESIUM CITRATE
0 SOLUTION, ORAL ORAL
Refills: 0 | DISCHARGE

## 2025-04-18 RX ORDER — MAGNESIUM CITRATE
296 SOLUTION, ORAL ORAL EVERY 24 HOURS
Refills: 0 | Status: DISCONTINUED | OUTPATIENT
Start: 2025-04-18 | End: 2025-04-24

## 2025-04-18 RX ADMIN — INSULIN LISPRO 8: 100 INJECTION, SOLUTION INTRAVENOUS; SUBCUTANEOUS at 11:07

## 2025-04-18 RX ADMIN — LABETALOL HYDROCHLORIDE 100 MILLIGRAM(S): 200 TABLET, FILM COATED ORAL at 21:59

## 2025-04-18 RX ADMIN — IPRATROPIUM BROMIDE AND ALBUTEROL SULFATE 3 MILLILITER(S): .5; 2.5 SOLUTION RESPIRATORY (INHALATION) at 11:53

## 2025-04-18 RX ADMIN — CLOPIDOGREL BISULFATE 75 MILLIGRAM(S): 75 TABLET, FILM COATED ORAL at 11:51

## 2025-04-18 RX ADMIN — Medication 4 MILLILITER(S): at 06:27

## 2025-04-18 RX ADMIN — MONTELUKAST SODIUM 10 MILLIGRAM(S): 10 TABLET ORAL at 21:59

## 2025-04-18 RX ADMIN — IPRATROPIUM BROMIDE AND ALBUTEROL SULFATE 3 MILLILITER(S): .5; 2.5 SOLUTION RESPIRATORY (INHALATION) at 18:07

## 2025-04-18 RX ADMIN — IPRATROPIUM BROMIDE AND ALBUTEROL SULFATE 3 MILLILITER(S): .5; 2.5 SOLUTION RESPIRATORY (INHALATION) at 06:26

## 2025-04-18 RX ADMIN — Medication 30 MILLILITER(S): at 11:52

## 2025-04-18 RX ADMIN — MEXILETINE HYDROCHLORIDE 200 MILLIGRAM(S): 250 CAPSULE ORAL at 21:59

## 2025-04-18 RX ADMIN — Medication 1 DOSE(S): at 06:27

## 2025-04-18 RX ADMIN — TIOTROPIUM BROMIDE INHALATION SPRAY 2 PUFF(S): 3.12 SPRAY, METERED RESPIRATORY (INHALATION) at 12:35

## 2025-04-18 RX ADMIN — LABETALOL HYDROCHLORIDE 100 MILLIGRAM(S): 200 TABLET, FILM COATED ORAL at 13:56

## 2025-04-18 RX ADMIN — INSULIN LISPRO 2: 100 INJECTION, SOLUTION INTRAVENOUS; SUBCUTANEOUS at 14:18

## 2025-04-18 RX ADMIN — Medication 4 MILLILITER(S): at 18:07

## 2025-04-18 RX ADMIN — INSULIN GLARGINE-YFGN 22 UNIT(S): 100 INJECTION, SOLUTION SUBCUTANEOUS at 11:08

## 2025-04-18 RX ADMIN — MEXILETINE HYDROCHLORIDE 200 MILLIGRAM(S): 250 CAPSULE ORAL at 06:27

## 2025-04-18 RX ADMIN — Medication 63.75 MILLIMOLE(S): at 14:28

## 2025-04-18 RX ADMIN — Medication 1 TABLET(S): at 11:52

## 2025-04-18 RX ADMIN — METHYLPREDNISOLONE ACETATE 60 MILLIGRAM(S): 80 INJECTION, SUSPENSION INTRA-ARTICULAR; INTRALESIONAL; INTRAMUSCULAR; SOFT TISSUE at 06:26

## 2025-04-18 RX ADMIN — FLUTICASONE PROPIONATE 1 SPRAY(S): 50 SPRAY, METERED NASAL at 18:11

## 2025-04-18 RX ADMIN — Medication 325 MILLIGRAM(S): at 11:51

## 2025-04-18 RX ADMIN — INSULIN LISPRO 4: 100 INJECTION, SOLUTION INTRAVENOUS; SUBCUTANEOUS at 18:08

## 2025-04-18 RX ADMIN — POLYETHYLENE GLYCOL 3350 17 GRAM(S): 17 POWDER, FOR SOLUTION ORAL at 11:52

## 2025-04-18 RX ADMIN — LEVETIRACETAM 1000 MILLIGRAM(S): 10 INJECTION, SOLUTION INTRAVENOUS at 06:25

## 2025-04-18 RX ADMIN — BUDESONIDE 1 MILLIGRAM(S): 0.25 SUSPENSION RESPIRATORY (INHALATION) at 18:07

## 2025-04-18 RX ADMIN — GLYCOPYRROLATE 1 MILLIGRAM(S): 0.2 INJECTION INTRAMUSCULAR; INTRAVENOUS at 13:56

## 2025-04-18 RX ADMIN — Medication 40 MILLIGRAM(S): at 06:28

## 2025-04-18 RX ADMIN — LACOSAMIDE 100 MILLIGRAM(S): 150 TABLET, FILM COATED ORAL at 06:26

## 2025-04-18 RX ADMIN — INSULIN GLARGINE-YFGN 28 UNIT(S): 100 INJECTION, SOLUTION SUBCUTANEOUS at 22:00

## 2025-04-18 RX ADMIN — ROSUVASTATIN CALCIUM 5 MILLIGRAM(S): 20 TABLET, FILM COATED ORAL at 21:59

## 2025-04-18 RX ADMIN — GLYCOPYRROLATE 1 MILLIGRAM(S): 0.2 INJECTION INTRAMUSCULAR; INTRAVENOUS at 06:27

## 2025-04-18 RX ADMIN — LACOSAMIDE 100 MILLIGRAM(S): 150 TABLET, FILM COATED ORAL at 18:07

## 2025-04-18 RX ADMIN — MEXILETINE HYDROCHLORIDE 200 MILLIGRAM(S): 250 CAPSULE ORAL at 13:55

## 2025-04-18 RX ADMIN — Medication 60 MILLIGRAM(S): at 21:59

## 2025-04-18 RX ADMIN — APIXABAN 5 MILLIGRAM(S): 2.5 TABLET, FILM COATED ORAL at 18:06

## 2025-04-18 RX ADMIN — BUDESONIDE 1 MILLIGRAM(S): 0.25 SUSPENSION RESPIRATORY (INHALATION) at 06:26

## 2025-04-18 RX ADMIN — GLYCOPYRROLATE 1 MILLIGRAM(S): 0.2 INJECTION INTRAMUSCULAR; INTRAVENOUS at 21:59

## 2025-04-18 RX ADMIN — Medication 20 MILLIGRAM(S): at 11:51

## 2025-04-18 RX ADMIN — Medication 500 MILLIGRAM(S): at 11:51

## 2025-04-18 RX ADMIN — Medication 2 TABLET(S): at 21:59

## 2025-04-18 RX ADMIN — LEVETIRACETAM 1000 MILLIGRAM(S): 10 INJECTION, SOLUTION INTRAVENOUS at 18:07

## 2025-04-18 RX ADMIN — APIXABAN 5 MILLIGRAM(S): 2.5 TABLET, FILM COATED ORAL at 06:26

## 2025-04-18 NOTE — CONSULT NOTE ADULT - SUBJECTIVE AND OBJECTIVE BOX
CHIEF COMPLAINT: AMS, SOB    HPI: Patient is a 73 yo F w/ asthma (chronic methypred 8mg PO daily), bronchiectasis, allergic rhinitis, recurrent PNA, SDB ( on CPAP), tracheomalacia s/p tracheoplasty, tracheobronchomalacia, pAfib (Eliquis), colorectal ca s/p colostomy, aortic dissection s/p ascending aorta repair who p/w AMS and hallucinations x 1 week as well as acute on chronic SOB x 2 weeks. As per patient and daughter at bedside, hallucinations started on Friday. Was instructed to obtain urine sample but was unable to. Patient also had been without her chest vest as awaiting a new one that arrive just earlier this week and so has not been able to do her usual airway clearance regimen. Endorses reduced ET over the past 5 days. Denies any fevers.    Labs notable for mild leukocytoisis WBC 10.55, Hb 10.9, VBG 7.4/43/70, UA slightly positive.    CT chest notable for mild bilateral interlobular septal thickening, trace left effusion as well as scattered bilateral sub-6 mm pulmonary nodules    Home airway clearance regimen includes Albuterol q6h -> Chest Vest -> Perforomist BID -> Pulmicort BID, - > Ohtuvayre 2.5 BID - > HyperSal 7% q12h. Patient also on Breo Ellipta 200 at 1 inhalation QD, Incruse Ellipta 1 puff QD, and on Tezspire    PAST MEDICAL & SURGICAL HISTORY:  Seizure  x 1 1/7/18      DVT (deep venous thrombosis)  15-20 years ago, took coumadin      TIA (transient ischemic attack)  multiple, last 5 years ago - presents as right-sided weakness      COPD (chronic obstructive pulmonary disease)      Atrial fibrillation      History of COPD      Afib      Aortic valve replaced      Epilepsy      Asthma      DM (diabetes mellitus)      History of seizure disorder      History of TIAs      HTN (hypertension)      Bronchiectasis      Colon cancer      History of partial hysterectomy  30 years ago - fibroids      H/O total knee replacement, bilateral  5 years ago      History of sinus surgery  multiple sinus surgeries      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      H/O pelvic surgery  5 years ago - s/p fracture      History of tracheomalacia  2015 - attempted tracheal stenting ([facility])- course complicated by obstruction, respiratory failure, multiple CPR attempts -  stent discontinued; 10/20/2016 Tracheobronchoplasty (Prolene Mesh) performed at [facility] by Dr Zapien      S/P bronchoscopy  6/5/2018 - [facility] (Dr Zapien) no evidence of tracheobronchomalacia in trachea or bronchial tubes      Rectal bleeding  exam under anesthesia ([facility]) 2/2018      S/P TAVR (transcatheter aortic valve replacement)      S/P aortic valve replacement      S/P colostomy      S/P AVR (aortic valve replacement)          FAMILY HISTORY:  Family history of asthma    Family history of breast cancer (Sibling)    Family history of diabetes mellitus type II        SOCIAL HISTORY:  Smoking: [X] Not current smoker  Substance Use: [ ] Never Used [ ] Used ____  EtOH Use:   Marital Status: [ ] Single [ ]  [ ]  [ ]   Sexual History:   Occupation:  Recent Travel:  Country of Birth:  Advance Directives:    Allergies    aspirin (Unknown)  codeine (Unknown)  codeine (Short breath)  Valium (Unknown)  cefepime (Short breath (Severe))  shellfish (Anaphylaxis)  penicillin (Anaphylaxis)  cefepime (Anaphylaxis)  penicillin (Unknown)  Percocet (Unknown)  Avelox (Short breath; Pruritus)  Dilaudid (Short breath)  ampicillin (Unknown)  Valium (Short breath)  OxyContin (Unknown)  aspirin (Short breath)  iodine (Short breath; Swelling)  tetanus toxoid (Short breath)  Avelox (Unknown)  meropenem (Unknown)    Intolerances        HOME MEDICATIONS:  Home Medications:  ascorbic acid 500 mg oral tablet: 1 tab(s) orally once a day (in the morning) (17 Apr 2025 18:50)  Breo Ellipta 200 mcg-25 mcg/inh inhalation powder: 1 puff(s) inhaled once a day (17 Apr 2025 18:42)  budesonide 1 mg/2 mL inhalation suspension: 2 milliliter(s) by nebulizer 2 times a day (17 Apr 2025 18:42)  clopidogrel 75 mg oral tablet: 1 tab(s) orally once a day (17 Apr 2025 18:49)  Cranberry oral tablet: 1 tab(s) orally once a day (in the morning) (17 Apr 2025 18:50)  DuoNeb 0.5 mg-2.5 mg/3 mL inhalation solution: 3 milliliter(s) by nebulizer 4 times a day (17 Apr 2025 18:42)  Eliquis 5 mg oral tablet: 1 tab(s) orally 2 times a day (17 Apr 2025 18:49)  ferrous sulfate: 325 milligram(s) orally once a day (17 Apr 2025 18:49)  Flonase 50 mcg/inh nasal spray: 1 spray(s) in each nostril once a day (17 Apr 2025 19:35)  glycopyrrolate 1 mg oral tablet: 1 tab(s) orally 3 times a day (17 Apr 2025 18:49)  HumaLOG 100 units/mL injectable solution: injectable Sliding Scale (17 Apr 2025 18:44)  Incruse Ellipta 62.5 mcg/inh inhalation powder: 1 inhaler(s) inhaled once a day (17 Apr 2025 18:42)  ipratropium 21 mcg/inh (0.03%) nasal spray: 2 spray(s) intranasally once a day (17 Apr 2025 19:35)  Keppra 500 mg oral tablet: 2 tab(s) orally 2 times a day (17 Apr 2025 18:49)  labetalol 100 mg oral tablet: 1 tab(s) orally every 8 hours (17 Apr 2025 18:49)  lacosamide 100 mg oral tablet: 1 tab(s) orally 2 times a day (17 Apr 2025 18:50)  Lantus Solostar Pen 100 units/mL subcutaneous solution: 22 unit(s) subcutaneous once a day (in the morning) (17 Apr 2025 18:42)  Lantus Solostar Pen 100 units/mL subcutaneous solution: 28 unit(s) subcutaneous once a day (at bedtime) (17 Apr 2025 18:42)  megestrol 20 mg oral tablet: 1 tab(s) orally once a day as needed for appetite (17 Apr 2025 18:49)  methylPREDNISolone 8 mg oral tablet: 1 tab(s) orally once a day (17 Apr 2025 18:48)  mexiletine 200 mg oral capsule: 1 cap(s) orally 3 times a day (17 Apr 2025 18:49)  mineral oil oral liquid: 30 milliliter(s) orally once a day (17 Apr 2025 18:50)  montelukast 10 mg oral tablet: 1 tab(s) orally once a day (at bedtime) (17 Apr 2025 18:49)  multivitamin: 1 tab(s) orally once a day (17 Apr 2025 18:46)  NIFEdipine 30 mg oral tablet, extended release: 1 tab(s) orally once a day (17 Apr 2025 18:49)  Ohtuvayre 3 mg/ 2.5mL inhalation suspension: 3 milligram(s) by nebulizer 2 times a day (17 Apr 2025 18:42)  pantoprazole 40 mg oral delayed release tablet: 1 tab(s) orally once a day (17 Apr 2025 18:49)  Perforomist 20 mcg/2 mL inhalation solution: 2 milliliter(s) inhaled 2 times a day (17 Apr 2025 18:42)  polyethylene glycol 3350 oral powder for reconstitution: 17 gram(s) orally once a day (17 Apr 2025 18:50)  rosuvastatin 5 mg oral tablet: 1 tab(s) orally once a day (at bedtime) (17 Apr 2025 18:49)  Senna 8.6 mg oral tablet: 2 tab(s) orally once a day (17 Apr 2025 18:50)  sertraline 50 mg oral tablet: 1 tab(s) orally once a day (in the morning) as needed for  anxiety (17 Apr 2025 18:49)  Sodium Chloride 7% Inhalation Solution: twice daily (17 Apr 2025 18:42)  Tezspire Pre-filled Pen 210 mg/1.91 mL subcutaneous solution: 210 milligram(s) subcutaneously once a month (17 Apr 2025 18:42)      REVIEW OF SYSTEMS:  Constitutional: [ ] negative [ ] fevers [ ] chills [ ] weight loss [ ] weight gain  HEENT: [ ] negative [ ] dry eyes [ ] eye irritation [ ] postnasal drip [ ] nasal congestion  CV: [ ] negative  [ ] chest pain [ ] orthopnea [ ] palpitations [ ] murmur  Resp: [ ] negative [X] cough [X] shortness of breath [ ] dyspnea [ ] wheezing [ ] sputum [ ] hemoptysis  GI: [ ] negative [ ] nausea [ ] vomiting [ ] diarrhea [ ] constipation [ ] abd pain [ ] dysphagia   : [ ] negative [ ] dysuria [ ] nocturia [ ] hematuria [ ] increased urinary frequency  Musculoskeletal: [ ] negative [ ] back pain [ ] myalgias [ ] arthralgias [ ] fracture  Skin: [ ] negative [ ] rash [ ] itch  Neurological: [ ] negative [ ] headache [ ] dizziness [ ] syncope [ ] weakness [ ] numbness  Psychiatric: [ ] negative [ ] anxiety [ ] depression  Endocrine: [ ] negative [ ] diabetes [ ] thyroid problem  Hematologic/Lymphatic: [ ] negative [ ] anemia [ ] bleeding problem  Allergic/Immunologic: [ ] negative [ ] itchy eyes [ ] nasal discharge [ ] hives [ ] angioedema  [X] All other systems negative  [ ] Unable to assess ROS because ________    OBJECTIVE:  ICU Vital Signs Last 24 Hrs  T(C): 36.8 (18 Apr 2025 05:36), Max: 36.9 (17 Apr 2025 12:52)  T(F): 98.3 (18 Apr 2025 05:36), Max: 98.5 (17 Apr 2025 12:52)  HR: 54 (18 Apr 2025 10:32) (45 - 59)  BP: 153/75 (18 Apr 2025 10:32) (122/75 - 153/75)  BP(mean): --  ABP: --  ABP(mean): --  RR: 18 (18 Apr 2025 05:36) (18 - 32)  SpO2: 95% (18 Apr 2025 10:32) (95% - 99%)    O2 Parameters below as of 18 Apr 2025 10:32  Patient On (Oxygen Delivery Method): room air              04-17 @ 07:01  -  04-18 @ 07:00  --------------------------------------------------------  IN: 0 mL / OUT: 650 mL / NET: -650 mL      CAPILLARY BLOOD GLUCOSE      POCT Blood Glucose.: 327 mg/dL (18 Apr 2025 11:01)      PHYSICAL EXAM:  General: NAD, resting comfortably  HEENT: EOMI, PERRLA. Exophthalomos  Neck: Supple  Respiratory: Scattered wheeze  Cardiovascular: S1S2   Abdomen: Soft, NTND, BS+  Extremities: No edema  Skin: Warm and dry  Neurological: Currently AAOx3    LINES:     HOSPITAL MEDICATIONS:  Standing Meds:  albuterol/ipratropium for Nebulization 3 milliLiter(s) Nebulizer every 6 hours  apixaban 5 milliGRAM(s) Oral every 12 hours  ascorbic acid 500 milliGRAM(s) Oral daily  buDESOnide    Inhalation Suspension 1 milliGRAM(s) Inhalation every 12 hours  clopidogrel Tablet 75 milliGRAM(s) Oral daily  dextrose 5%. 1000 milliLiter(s) IV Continuous <Continuous>  dextrose 5%. 1000 milliLiter(s) IV Continuous <Continuous>  dextrose 50% Injectable 25 Gram(s) IV Push once  dextrose 50% Injectable 12.5 Gram(s) IV Push once  dextrose 50% Injectable 25 Gram(s) IV Push once  ertapenem  IVPB 1000 milliGRAM(s) IV Intermittent every 24 hours  ferrous    sulfate 325 milliGRAM(s) Oral daily  fluticasone propionate/ salmeterol 250-50 MICROgram(s) Diskus 1 Dose(s) Inhalation two times a day  glucagon  Injectable 1 milliGRAM(s) IntraMuscular once  glycopyrrolate 1 milliGRAM(s) Oral three times a day  insulin glargine Injectable (LANTUS) 22 Unit(s) SubCutaneous every morning  insulin glargine Injectable (LANTUS) 28 Unit(s) SubCutaneous at bedtime  insulin lispro (ADMELOG) corrective regimen sliding scale   SubCutaneous three times a day before meals  insulin lispro (ADMELOG) corrective regimen sliding scale   SubCutaneous at bedtime  labetalol 100 milliGRAM(s) Oral every 8 hours  lacosamide 100 milliGRAM(s) Oral two times a day  levETIRAcetam 1000 milliGRAM(s) Oral two times a day  megestrol 20 milliGRAM(s) Oral daily  methylPREDNISolone sodium succinate Injectable 60 milliGRAM(s) IV Push daily  mexiletine 200 milliGRAM(s) Oral three times a day  mineral oil 30 milliLiter(s) Oral daily  montelukast 10 milliGRAM(s) Oral at bedtime  multivitamin 1 Tablet(s) Oral daily  NIFEdipine XL 30 milliGRAM(s) Oral daily  pantoprazole    Tablet 40 milliGRAM(s) Oral before breakfast  polyethylene glycol 3350 17 Gram(s) Oral daily  rosuvastatin 5 milliGRAM(s) Oral at bedtime  senna 2 Tablet(s) Oral at bedtime  sodium chloride 7% Inhalation 4 milliLiter(s) Inhalation every 12 hours  tiotropium 2.5 MICROgram(s) Inhaler 2 Puff(s) Inhalation daily      PRN Meds:  dextrose Oral Gel 15 Gram(s) Oral once PRN  diphenhydrAMINE 50 milliGRAM(s) Oral daily PRN  sertraline 50 milliGRAM(s) Oral daily PRN      LABS:                        10.9   10.55 )-----------( 284      ( 18 Apr 2025 10:27 )             35.2     Hgb Trend: 10.9<--, 11.0<--  04-18    138  |  104  |  16  ----------------------------<  250[H]  4.8   |  23  |  0.58    Ca    9.1      18 Apr 2025 10:27  Phos  1.9     04-18  Mg     2.2     04-18    TPro  6.4  /  Alb  3.6  /  TBili  0.2  /  DBili  x   /  AST  19  /  ALT  31  /  AlkPhos  90  04-18    Creatinine Trend: 0.58<--, 0.67<--    Urinalysis Basic - ( 18 Apr 2025 10:27 )    Color: x / Appearance: x / SG: x / pH: x  Gluc: 250 mg/dL / Ketone: x  / Bili: x / Urobili: x   Blood: x / Protein: x / Nitrite: x   Leuk Esterase: x / RBC: x / WBC x   Sq Epi: x / Non Sq Epi: x / Bacteria: x        Venous Blood Gas:  04-18 @ 09:51  7.41/43/70/27/94.6  VBG Lactate: 1.5  Venous Blood Gas:  04-17 @ 13:20  7.40/42/89/26/97.5  VBG Lactate: 1.9      MICROBIOLOGY:       RADIOLOGY:  [ ] Reviewed and interpreted by me    PULMONARY FUNCTION TESTS:    EKG:

## 2025-04-18 NOTE — DIETITIAN INITIAL EVALUATION ADULT - ETIOLOGY
related to inability to meet sufficient protein-energy needs in setting of diminished appetite, altered mental status  related to diabetes nutrition therapy

## 2025-04-18 NOTE — DIETITIAN INITIAL EVALUATION ADULT - NSFNSADHERENCEPTAFT_GEN_A_CORE
Pt's daughter stated the pt follows a regular diet at home, no restrictions. Pt's daughter confirmed pt's history of type 2 diabetes, stated she and the patient have been more liberal with her diet lately in setting of poor po intake. Stated the pt mostly follows a low sodium diet, does not monitor carbohydrates or added sugar intake. Daughter stated the pt wears a Freestyle Luis E at baseline, endorsed average blood glucose levels 160-190 mg/dL. Stated the pt takes insulin at home (Lantus and HumaLOG noted in H&P).

## 2025-04-18 NOTE — DIETITIAN INITIAL EVALUATION ADULT - ORAL INTAKE PTA/DIET HISTORY
Nutrition assessment completed at bedside. Pt's daughter present for interview, provided subjective information as pt noted to be confused. Pt's daughter reported decreased appetite/po intake for ~2-3 weeks prior to admission. Stated pt was eating significantly less, was "dehydrated" (per daughter). Daughter reported prior to this, pt was eating well at baseline. Daughter endorsed UBW of ~155 pounds (1 month ago), endorsed weight loss since then. Pt stated she is unsure of what she currently weighs at this time, however endorsed weight loss. Confirmed food allergy to shellfish (anaphylaxis). No other food allergies reported at this time.

## 2025-04-18 NOTE — PROGRESS NOTE ADULT - PROBLEM SELECTOR PLAN 6
- home meds: eliquis 5mg BID     Plan:   - c/w eliquis 5 mg BID  - c/w labetalol 100 mg TID  - c/w mexiletine 200 mg TID

## 2025-04-18 NOTE — BH CONSULTATION LIAISON ASSESSMENT NOTE - NSBHMSEREMMEM_PSY_A_CORE
Lorenza Hogan is an 1 year old female who presents with her foster mother for a pre-op exam requested by Dr. Kendall Duff DDS, for clearance for dental work under general anesthesia.  She has multiple dental caries. The procedure is scheduled for 9/16.    Past Medical History:   Diagnosis Date   • Cardiac murmur     normal echocardiogram on 11/1/21 by Dr. Keita   • Foreign body ingestion 07/13/2020    Removal by Dr. Jorge ENT at [facility] under anesthesia.   • Seizure (CMS/HCC) 07/13/2020    Seen at [facility] neurology and started on medication.     Past Surgical History:   Procedure Laterality Date   • Hb laryngoscopy direct w fb removal      Rigid     Family History   Problem Relation Age of Onset   • ADHD/ADD Mother    • Heart Mother         heart surgery   • Liver Disease Mother    • Seizure Disorder Mother    • Obesity Mother    • ADHD/ADD Father    • Seizure Disorder Father    • ADHD/ADD Maternal Grandmother    • Blindness Maternal Grandmother    • Diabetes Maternal Grandmother    • Thyroid Maternal Grandmother    • Heart disease Maternal Grandmother    • Obesity Maternal Grandmother    • Diabetes Paternal Grandmother    • Thyroid Paternal Grandmother    • Seizure Disorder Paternal Grandmother    • ADHD/ADD Paternal Grandmother    • Seizure Disorder Maternal Grandfather    • Heart disease Other         extended family   • Cancer Other         lung cancer in maternal great grandfather   • ADHD/ADD Maternal Uncle    • Seizure Disorder Paternal Grandfather    There is no family history of bleeding disorders or problems with anesthesia that are known.  However, foster Mom is not aware of much of the family history.    Social History     Tobacco Use   • Smoking status: Not on file   • Smokeless tobacco: Not on file   Substance Use Topics   • Alcohol use: Not on file       Prior to Admission Meds:(Not in a hospital admission)    Scheduled Meds:  No current facility-administered medications for this visit.     Continuous  Infusions:  No current facility-administered medications for this visit.     PRN Meds:  No current facility-administered medications for this visit.      ALLERGIES:  No Known Allergies    Active Problems:    * No active hospital problems. *    Blood pressure 90/54, pulse 100, resp. rate 24, height 3' 1.5\" (0.953 m), weight 13.5 kg (29 lb 11.2 oz).    Review of Systems   Constitutional: Negative.    HENT: Positive for congestion, dental problem and rhinorrhea.         She always has a runny nose and occasionally a productive sounding cough. She has tried Claritin and she vomits.  We discussed trial of Zyrtec.    She has multiple dental caries.   Eyes: Negative.    Respiratory: Positive for cough.         Mild cough from post-nasal drip when she awakens in the morning.   Cardiovascular: Negative.    Gastrointestinal: Positive for diarrhea.        She had diarrhea last week for 3-4 days.  Her symptoms have since resolved.   Endocrine: Negative.    Genitourinary: Negative.    Musculoskeletal: Negative.    Skin: Negative.    Allergic/Immunologic: Positive for environmental allergies.        Rhinorrhea and congestion all the time, likely a pollen allergy.   Neurological: Positive for seizures.        She had a history of seizures and had a single short 1 minutes seizure 4 days ago.   Hematological: Negative.    Psychiatric/Behavioral: Negative.         Physical Exam  Constitutional:       General: She is active.      Appearance: Normal appearance. She is well-developed and normal weight.   HENT:      Head: Normocephalic and atraumatic.      Right Ear: Tympanic membrane, ear canal and external ear normal.      Left Ear: Tympanic membrane, ear canal and external ear normal.      Ears:      Comments: Moderate cerumen partially obstructing visualization.  The portion of each tympanic membrane that is visible is normal.     Nose: Congestion and rhinorrhea present.      Comments: Mild nasal congestion with pale nasal mucosa and  clear rhinorrhea.     Mouth/Throat:      Mouth: Mucous membranes are moist.      Pharynx: Oropharynx is clear. No oropharyngeal exudate or posterior oropharyngeal erythema.      Comments: Multiple dental caries present.  Eyes:      General: Red reflex is present bilaterally.      Extraocular Movements: Extraocular movements intact.      Conjunctiva/sclera: Conjunctivae normal.      Pupils: Pupils are equal, round, and reactive to light.   Cardiovascular:      Rate and Rhythm: Normal rate and regular rhythm.      Heart sounds: Normal heart sounds. No murmur heard.  Pulmonary:      Effort: Pulmonary effort is normal. No retractions.      Breath sounds: Normal breath sounds. No stridor. No wheezing, rhonchi or rales.   Abdominal:      General: Abdomen is flat. There is no distension.      Palpations: Abdomen is soft. There is no mass.   Genitourinary:     General: Normal vulva.   Musculoskeletal:         General: No swelling or tenderness. Normal range of motion.      Cervical back: Normal range of motion and neck supple.   Skin:     General: Skin is warm.      Findings: No rash.   Neurological:      General: No focal deficit present.      Mental Status: She is alert and oriented for age.       Assessment:  Pre-op Exam  Multiple Dental Caries  Allergic rhinitis    Plan:  Proceed with dental work under general anesthesia as planned.    COVID-19 test scheduled for Sept.13 at 4:30 pm.  Order placed.  A copy to be sent to Dr. Duff when completed.    Trial of Zyrtec daily for allergies.    A copy of this note will be sent to Dr. Duff.    Claritza Corona MD  9/2/2022   Normal

## 2025-04-18 NOTE — PROGRESS NOTE ADULT - PROBLEM SELECTOR PLAN 2
- tachpechnic to RR of 30s, proBNP 3648 (baseline 1896 in Feb 2025)  -  CXR showed trace R pleural effusion.   - Received solumedrol 40 mg IV, duoneb x 3 in ED    Plan:   - c/w duoneb Q6 standing, Advair BID, Spiriva qd   - c/w budesonide 1mg Q12, montelukast 10 mg qd   - c/w methylprednisolone 60 IV qd   - pt uses CPAP and VESE at home (advised pt's daughter to bring in from home)   - chest PT, hypertonic saline   -

## 2025-04-18 NOTE — PROGRESS NOTE ADULT - ATTENDING COMMENTS
74F with complex medical history including severe steroid-dependent asthma, bronchiectasis, COPD, tracheomalacia s/p tracheoplasty, Type A dissection s/p bioAVR with Bental procedure (2022), severe AS s/p TAVR (9/10/2023), TIAs, DVT, afib on Eliquis, colon cancer S/P resection + colostomy, recently placed on ertapenem for ESBL proteus in sputum cx p/w worsening shortness of breath over the last 7 days, as well as multiple falls in the last month, and new onset visual hallucinations.     # Asthma/bronchiectasis exacerbation  - in setting of  recent ESBL proteus infection, DIEUDONNE, yeast/mold in sputum cx  - hold home steroids; start solumedrol 60 mg daily -> pulm recs continuing on 40 mg daily  - completed 2 wks of ertapenem . f/u ID if any other organism needs to be targeted    # Hallucinations  - unclear etiology, no sign of psychosis and pt is linear in thought (although per daughter is not the best historian and confuses timeline of events). she was previously aware of her visual hallucinations (seeing  relatives) but  she was hallucinating a man in a white shirt vandalizing a red sports car in the parking lot and would not accept that this was not happening  - MRI head to r/o structural pathology silas given recent multiple falls, history of TIAs, and complaints of R sided headache  - prior MRI head reviewed 2023 with moderately severe chronic white matter microvascular type changes  - neuro/psych consult   - thyroid studies    # Multiple falls  - PT evaluation  - MR as above  - orthostatics negative    # Type A aortic dissection s/p repair and Type B aortic dissection  - strict BP/HR control  - c/w home bp meds    Rest of plan as above. D/w HS.    The necessity of the time spent during the encounter on this date of service was due to:   - Ordering, reviewing, and interpreting labs, testing, and imaging  - Independently obtaining a review of systems and performing a physical exam  - Reviewing prior hospitalization and where necessary, outpatient records  - Reviewing consultant recommendations/communicating with consultants  - Counselling and educating patient and family regarding interpretation of aforementioned items and plan of care  - Time does not include teaching    Time-based billing (NON-critical care). Total minutes spent: 50

## 2025-04-18 NOTE — DIETITIAN INITIAL EVALUATION ADULT - PERTINENT LABORATORY DATA
04-18    138  |  104  |  16  ----------------------------<  250[H]  4.8   |  23  |  0.58    Ca    9.1      18 Apr 2025 10:27  Phos  1.9     04-18  Mg     2.2     04-18    TPro  6.4  /  Alb  3.6  /  TBili  0.2  /  DBili  x   /  AST  19  /  ALT  31  /  AlkPhos  90  04-18  POCT Blood Glucose.: 327 mg/dL (04-18-25 @ 11:01)  A1C with Estimated Average Glucose Result: 7.7 % (02-21-25 @ 08:41)  A1C with Estimated Average Glucose Result: 7.8 % (01-09-25 @ 05:16)  A1C with Estimated Average Glucose Result: 8.0 % (10-10-24 @ 09:19)

## 2025-04-18 NOTE — BH CONSULTATION LIAISON ASSESSMENT NOTE - NSBHCONSULTRECOMMENDOTHER_PSY_A_CORE FT
As patient is experiencing these hallucinations (olfactory, visual) infrequently and is not bothered by them, do not feel antipsychotic medication is currently required.  Recommend completing medical work-up for new onset visual hallucinations including head imaging as you are doing.

## 2025-04-18 NOTE — DIETITIAN INITIAL EVALUATION ADULT - NSFNSGIIOFT_GEN_A_CORE
Pt reported no GI distress at this time. Noted with colostomy (per chart).   - Bowel Regimen: Miralax, Senna, Magnesium Citrate (per orders).   Continue to monitor colostomy output.

## 2025-04-18 NOTE — DIETITIAN INITIAL EVALUATION ADULT - NSFNSNUTRHOMESUPPLEMENTFT_GEN_A_CORE
Pt and daughter stated the pt takes A Multivitamin, Oil of Oregano, Vitamin C and Cranberry supplements prior to admission. Pt stated she drinks Glucerna 3x/day at home.

## 2025-04-18 NOTE — CONSULT NOTE ADULT - ATTENDING COMMENTS
74y (1950) woman with a PMHx significant for epilepsy, COPD, asthma, DM2, HTN, aortic dissection, aortic aneurysm, severe AS s/p TAVR, DVT, AFib, and colon cancer s/p resection with colostomy creation, admitted for worsening SOB, neuro consulted for pt complaining of recent hallucinations. Pt had a single episode of VH in the form of a tall figure or "possible family member" in the corner of her room as she was falling asleep, also reports multiple episodes over the past year of feeling the presence of her   and sister, which is consistent with pt's spiritual beliefs and are not distressing. PE was unremarkable for any significant neurological deficits. Pt had no reported visual or auditory hallucinations at time of interview. Pt's recent episode is likely consistent with a hypnagogic hallucination, no further neuro workup or treatment for hallucinations required at this time. Per pt's outpatient neurologist, her subjective cognitive difficulties is likely consistent with her poorly controlled temporal epilepsy.    Impression: The patient has known left temporal lobe epilepsy that is not completely controlled, followed by Dr Horner. To us, she reports single episode of seeing person in her room before bedtime (but very early in the morning), consistent with a hypnagogic hallucination. Also endorses occasionally feeling presence of her  /sister.     For her epilepsy, she should continue Keppra 1000 mg BID and lacosamide 100 mg BID. We agree with brain MRI, which was planned by outpatient neuro for epilepsy f/u.   In my judgement, no need for additional inpatient workup or treatment at this time. She can follow-up with Dr Horner as outpatient.

## 2025-04-18 NOTE — DIETITIAN NUTRITION RISK NOTIFICATION - PHYSICAL ASSESSMENT SHOULDERS
Funmilayo Adhikari  Admission Date: 6/23/2021             Daily Progress Note: 6/28/2021    The patient's chart is reviewed and the patient is discussed with the staff. Funmilayo Adhikari is an 78yoF who had a prolonged recovery from COVID-19 pneumonia with ARDS and was intubated from 12/30-1/19. She was decannulated at [facility] (after 3/9 discharge from [facility]). She also has h/o CAD, HTN, gastritis with UGIB, multiple infections (stenotrophomonas VAP, VRE UTI). She was admitted with stridor and found to have bilateral vocal cord paralysis. Thus far have been able to manage with CPAP/BiPAP and steroids. ENT has seen and plans short-term outpatient follow up to decide about trach. Subjective: On RA with sat of 98%  S/P MBS and diet advanced.  Up to the Chair on RA    Current Facility-Administered Medications   Medication Dose Route Frequency    [Held by provider] furosemide (LASIX) injection 20 mg  20 mg IntraVENous DAILY    0.45% sodium chloride infusion  50 mL/hr IntraVENous CONTINUOUS    hydrALAZINE (APRESOLINE) 20 mg/mL injection 10 mg  10 mg IntraVENous Q6H PRN    methyl salicylate-menthol (BENGAY) 15-10 % cream   Topical TID PRN    metoprolol succinate (TOPROL-XL) XL tablet 12.5 mg  12.5 mg Oral DAILY    lip protectant (BLISTEX) ointment 1 Each  1 Each Topical PRN    spironolactone (ALDACTONE) tablet 12.5 mg  12.5 mg Oral DAILY    methylPREDNISolone (PF) (SOLU-MEDROL) injection 40 mg  40 mg IntraVENous Q12H    insulin glargine (LANTUS) injection 12 Units  12 Units SubCUTAneous DAILY    nystatin (MYCOSTATIN) 100,000 unit/gram powder   Topical PRN    insulin lispro (HUMALOG) injection   SubCUTAneous AC&HS    sodium chloride (OCEAN) 0.65 % nasal squeeze bottle 2 Spray  2 Spray Both Nostrils Q2H PRN    sodium chloride (NS) flush 5-40 mL  5-40 mL IntraVENous PRN    acetaminophen (TYLENOL) tablet 650 mg  650 mg Oral Q6H PRN    Or    acetaminophen (TYLENOL) suppository 650 mg  650 mg Rectal Q6H PRN    polyethylene glycol (MIRALAX) packet 17 g  17 g Oral DAILY PRN    ondansetron (ZOFRAN ODT) tablet 4 mg  4 mg Oral Q8H PRN    Or    ondansetron (ZOFRAN) injection 4 mg  4 mg IntraVENous Q6H PRN    budesonide (PULMICORT) 500 mcg/2 ml nebulizer suspension  500 mcg Nebulization BID RT    albuterol-ipratropium (DUO-NEB) 2.5 MG-0.5 MG/3 ML  3 mL Nebulization Q4H RT    heparin (porcine) injection 5,000 Units  5,000 Units SubCUTAneous Q12H    alcohol 62% (NOZIN) nasal  1 Ampule  1 Ampule Topical Q12H    sodium chloride (NS) flush 5-10 mL  5-10 mL IntraVENous Q8H    sodium chloride (NS) flush 5-10 mL  5-10 mL IntraVENous PRN       Review of Systems  Constitutional: negative for fever, chills, sweats  Cardiovascular: negative for chest pain, palpitations, syncope, edema  Gastrointestinal:  negative for dysphagia, reflux, vomiting, diarrhea, abdominal pain, or melena  Neurologic:  negative for focal weakness, numbness, headache    Objective:     Vitals:    06/28/21 0452 06/28/21 0552 06/28/21 0714 06/28/21 0727   BP:   (!) 163/78    Pulse:   86    Resp:   15    Temp:   98.3 °F (36.8 °C)    SpO2: 99%  90% 96%   Weight:  152 lb 6.4 oz (69.1 kg)     Height:             Intake/Output Summary (Last 24 hours) at 6/28/2021 0946  Last data filed at 6/28/2021 0800  Gross per 24 hour   Intake 30 ml   Output 1600 ml   Net -1570 ml       Physical Exam:   Constitution:  the patient is well developed and in no acute distress  EENMT: hoarse but no stridor  Respiratory: CTA on RA  Cardiovascular:  RRR without M,G,R   Gastrointestinal: soft and non-tender; with positive bowel sounds. Musculoskeletal: warm without cyanosis. There is no lower extremity edema. Skin:  no jaundice or rashes, no wounds   Neurologic: no gross neuro deficits, alert and oriented x 3    CXR:       CT Neck soft tissue 6/24  1. Soft tissue thickening in the subglottic region, resulting in airway  narrowing. Findings nonspecific but could be secondary to edema. 2. No peritonsillar or retropharyngeal fluid collection to suggest abscess. Epiglottis within normal limits. 3. Multiple thyroid nodules, largest in the left thyroid lobe measures up to 2.5  cm. Consider follow-up outpatient thyroid ultrasound to better evaluate. 4. Bilateral mastoid effusions, similar to prior exam.  5. Absence of IV contrast limits sensitivity. LAB  Recent Labs     06/28/21  0619 06/27/21  2058 06/27/21  1513 06/27/21  1125 06/27/21  0658   GLUCPOC 145* 144* 178* 178* 135*      Recent Labs     06/27/21  0433 06/26/21  0314   WBC 8.8 8.9   HGB 7.7* 7.5*   HCT 25.0* 24.1*   * 118*     Recent Labs     06/28/21  0506 06/27/21  0433 06/26/21  0314   * 149* 148*   K 4.7 4.7 4.7   * 116* 116*   CO2 29 28 27   * 122* 98   BUN 69* 67* 72*   CREA 2.07* 1.95* 1.81*   CA 9.4 9.4 9.1       Assessment:  (Medical Decision Making)     Hospital Problems  Date Reviewed: 6/28/2021        Codes Class Noted POA    TANISHA (obstructive sleep apnea) (Chronic) ICD-10-CM: G47.33  ICD-9-CM: 327.23  6/28/2021 Yes        Stridor ICD-10-CM: R06.1  ICD-9-CM: 786.1  6/25/2021 Yes        * (Principal) Vocal cord paresis ICD-10-CM: J38.00  ICD-9-CM: 478.30  6/25/2021 Yes        Acute hypercapnic respiratory failure (HCC) ICD-10-CM: J96.02  ICD-9-CM: 518.81  6/24/2021 Yes        Insomnia (Chronic) ICD-10-CM: G47.00  ICD-9-CM: 780.52  6/15/2015 Yes            Here with vocal cord paralysis. Seen by speech and MBS completed. Hx TANISHA and wasn't compliant. Now agreeable. She has diastolic dysfunction and Group 2 PH- diuresing and added low dose spironolactone. Plan:  (Medical Decision Making)     -- Will need 2-3 f/u with Dr. Margaret Saldivar upon discharge  -- convert to oral steroids  -- She is on RA, CPAP.  Eval for home CPAP but plans to return to SNF long term, will continue there on current settings, RT to document in connect care  -- Office follow up in the sleep lab arranged if she is discharged from facility   -- discharge plan per hospitalsit    More than 50% of the time documented was spent in face-to-face contact with the patient and in the care of the patient on the floor/unit where the patient is located.     Daly Vo NP none

## 2025-04-18 NOTE — DIETITIAN INITIAL EVALUATION ADULT - PHYSCIAL ASSESSMENT
Nutrition focused physical exam conducted with pt's verbal consent, daughter's verbal consent and presence.

## 2025-04-18 NOTE — PROGRESS NOTE ADULT - PROBLEM SELECTOR PLAN 10
- Hx of dissection of descending thoracic aorta, , hx of aortic aneurysm,  Type B dissection (7/2024), medically managed initially as she did not meet criteria for surgery at that time), Type A dissection s/p bioAVR with Bental procedure (9/2022)    Plan:   - c/w labetalol 100 mg TID   - strict BP control

## 2025-04-18 NOTE — CONSULT NOTE ADULT - ASSESSMENT
The patient is 73 y/o woman with PMH of epilepsy, COPD, DM, bronchiectasis, tracheomalacia s/p tracheloplasty, HTN, hx of dissection of descending thoracic aorta, hx of aortic aneurysm, type B dissection (7/2024), type A dissection s/p bio AVR with Bentall procedure (9/2022), severe AS s/p TAVR (9/10/2023), TIA's, Afib on Eliquis, colon cancer s/p colostomy, who presents to the ED for fever. Patient reports having cough for past 2-3 days, associated with fever. Patient endorses increased weakness and fatigue. Patient has hx of recurrent PNA  Doxycycline prescribed by PCP. Afebrile in ED. Work up shows: WBC 10.58, Cr 0.77, Lactate 1.3, Procalcitonin 0.08.       - Noted numerous allergies listed to PCN,   cefepime etc. Known hx of aortic aneurysm and dissection. (contra-indication to FQs). Seizure disorder - caution with carbapenems needed.  pt completing a 14 day course of invanz for an ESBL proteus in the sputum given via a pICC line    current admission does not demonstrate a new pna but she has had visual hallucinations    no fever or chills      Impression  # COPD    s/p Invanz for 14 days   Recent fall ( hs of seizures)  Underlying colon cancer,  AVR, aortic aneurysm,     Recommendations:  -Would favor to monitor off of antibiotics  - dc INVANZ ( not allergic but it does decrease seizure threshold )   MANY ALLERGIES LISTED INCLUDING CEPHALOSPORINS AND PENICILLINS     -Supplemental oxygen as needed to maintain SpO2 88-92%

## 2025-04-18 NOTE — CONSULT NOTE ADULT - SUBJECTIVE AND OBJECTIVE BOX
Patient is a 74y old  Female who presents with a chief complaint of visual hallucination, multiple falls, SOB (18 Apr 2025 14:49)    HPI:  74yFemale with pmhx of Epilepsy on Keppra, COPD, asthma  (on CPAP and VATS at home, on chronic steroids), DM2, bronchiectasis, tracheomalacia s/p tracheloplasty, HTN, Hx of dissection of descending thoracic aorta, hx of aortic aneurysm, Type B dissection (7/2024), medically managed initially as she did not meet criteria for surgery at that time), evaluated by Dr. Antonio, Type A dissection s/p bioAVR with Bental procedure (9/2022), severe AS s/p TAVR (9/10/2023), TIA's, DVT, Afib on Eliquis,  Colon cancer S/P resection, colostomy bag, neuropathy, Midline for IV ABX placed 2 weeks ago for chronic "TB like" pneumonia on ertapenmen (today is supposed to be the last dose at 6pm) presenting today for worsening shortness of breath over the last 7 days. Per daughter, pt does not use inhaler Q4 as instructed and when it happens, pt develops SOB even with just 5 meters of walking. Pt states that she has fall 3-4 times in the last month, with last episode this morning 5AM when she was walking to the bathroom. She felt like her legs were giving up, denies any dizziness, headstrike, injury to any body parts, LOC, CP, palpitation. She also endorse intermittent new onset visual hallucination (seeing  and sister when they are not there) starting Friday 4/11. She reports having chronic cough due to asthma but has also been having burning on urination. Denies fever, recent illness, abd pain, n/v/d.       ED Course: bradycardic in 40s, on 4L of NC, tachpechnic to RR of 30s. Labs showed mild hyperkalemia 5.5 (hemolyzed), glucose 338, proBNP 3648 (baseline 1896 in Feb 2025), CXR showed trace R pleural effusion.   Received solumedrol 40 mg IV, duoneb x 3 in ED.  (17 Apr 2025 17:26)      PAST MEDICAL & SURGICAL HISTORY:  Seizure  x 1 1/7/18      DVT (deep venous thrombosis)  15-20 years ago, took coumadin      TIA (transient ischemic attack)  multiple, last 5 years ago - presents as right-sided weakness      COPD (chronic obstructive pulmonary disease)      Atrial fibrillation      History of COPD      Afib      Aortic valve replaced      Epilepsy      Asthma      DM (diabetes mellitus)      History of seizure disorder      History of TIAs      HTN (hypertension)      Bronchiectasis      Colon cancer      History of partial hysterectomy  30 years ago - fibroids      H/O total knee replacement, bilateral  5 years ago      History of sinus surgery  multiple sinus surgeries      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      H/O pelvic surgery  5 years ago - s/p fracture      History of tracheomalacia  2015 - attempted tracheal stenting ([facility])- course complicated by obstruction, respiratory failure, multiple CPR attempts -  stent discontinued; 10/20/2016 Tracheobronchoplasty (Prolene Mesh) performed at [facility] by Dr Zapien      S/P bronchoscopy  6/5/2018 - [place] (Dr Zapien) no evidence of tracheobronchomalacia in trachea or bronchial tubes      Rectal bleeding  exam under anesthesia ([facility]) 2/2018      S/P TAVR (transcatheter aortic valve replacement)      S/P aortic valve replacement      S/P colostomy      S/P AVR (aortic valve replacement)          Social history:    FAMILY HISTORY:  Family history of asthma    Family history of breast cancer (Sibling)    Family history of diabetes mellitus type II      REVIEW OF SYSTEMS  General:	Denies any malaise fatigue or chills. Fevers absent    Skin:No rash  	  Ophthalmologic:Denies any visual complaints,discharge redness or photophobia  	  ENMT:No nasal discharge,headache,sinus congestion or throat pain.No dental complaints    Respiratory and Thorax:No cough,sputum or chest pain.Denies shortness of breath  	  Cardiovascular:	No chest pain,palpitaions or dizziness    Gastrointestinal:	NO nausea,abdominal pain or diarrhea.    Genitourinary:	No dysuria,frequency. No flank pain    Musculoskeletal:	No joint swelling or pain.No weakness    Neurological:No confusion,diziness.No extremity weakness.No bladder or bowel incontinence	    Psychiatric:No delusions or hallucinations	    Hematology/Lymphatics:	No LN swelling.No gum bleeding     Endocrine:	No recent weight gain or loss.No abnormal heat/cold intolerance    Allergic/Immunologic:	No hives or rash   Allergies    aspirin (Unknown)  codeine (Unknown)  codeine (Short breath)  Valium (Unknown)  cefepime (Short breath (Severe))  shellfish (Anaphylaxis)  penicillin (Anaphylaxis)  cefepime (Anaphylaxis)  penicillin (Unknown)  Percocet (Unknown)  Avelox (Short breath; Pruritus)  Dilaudid (Short breath)  ampicillin (Unknown)  Valium (Short breath)  OxyContin (Unknown)  aspirin (Short breath)  iodine (Short breath; Swelling)  tetanus toxoid (Short breath)  Avelox (Unknown)  meropenem (Unknown)    Intolerances        Antimicrobials:          Vital Signs Last 24 Hrs  T(C): 36.9 (18 Apr 2025 12:00), Max: 36.9 (18 Apr 2025 12:00)  T(F): 98.4 (18 Apr 2025 12:00), Max: 98.4 (18 Apr 2025 12:00)  HR: 54 (18 Apr 2025 12:00) (46 - 59)  BP: 142/58 (18 Apr 2025 12:00) (142/58 - 153/75)  BP(mean): --  RR: 18 (18 Apr 2025 12:00) (18 - 26)  SpO2: 92% (18 Apr 2025 12:00) (92% - 99%)    Parameters below as of 18 Apr 2025 12:00  Patient On (Oxygen Delivery Method): room air             ENMT:No sinus tenderness.No thrush.No pharyngeal exudate or erythema.Fair dental hygiene    Neck:Supple,No LN,no JVD      Respiratory:Good air entry bilaterally,CTA    Cardiovascular:S1 S2 wnl, No murmurs,rub or gallops    Gastrointestinal:Soft BS(+) no tenderness no masses ,No rebound or guarding    Genitourinary:No CVA tendereness     Rectal:    Extremities:No cyanosis,clubbing or edema.    Vascular:peripheral pulses felt                                    10.9   10.55 )-----------( 284      ( 18 Apr 2025 10:27 )             35.2         04-18    138  |  104  |  16  ----------------------------<  250[H]  4.8   |  23  |  0.58    Ca    9.1      18 Apr 2025 10:27  Phos  1.9     04-18  Mg     2.2     04-18    TPro  6.4  /  Alb  3.6  /  TBili  0.2  /  DBili  x   /  AST  19  /  ALT  31  /  AlkPhos  90  04-18      RECENT CULTURES:      MICROBIOLOGY:          Radiology:      Assessment:        Recommendations and Plan:    Pager [phone]  After 5 pm/weekends or if no response :[phone]

## 2025-04-18 NOTE — PROGRESS NOTE ADULT - SUBJECTIVE AND OBJECTIVE BOX
*******************************  Hien Chavez, PGY-1  Internal Medicine  Contact via [employer] TEAMS    *******************************    PROGRESS NOTE:     Patient is a 74y old  Female who presents with a chief complaint of visual hallucination, multiple falls, SOB (2025 17:26)      INTERVAL EVENTS: No acute overnight events.     SUBJECTIVE: Patient seen and examined at bedside. This morning, the patient is comfortable and doing well. No acute complaints. Denies fevers, chills, N/V/D, chest pain, SOB, abdominal pain.    MEDICATIONS  (STANDING):  albuterol/ipratropium for Nebulization 3 milliLiter(s) Nebulizer every 6 hours  apixaban 5 milliGRAM(s) Oral every 12 hours  ascorbic acid 500 milliGRAM(s) Oral daily  buDESOnide    Inhalation Suspension 1 milliGRAM(s) Inhalation every 12 hours  clopidogrel Tablet 75 milliGRAM(s) Oral daily  dextrose 5%. 1000 milliLiter(s) (50 mL/Hr) IV Continuous <Continuous>  dextrose 5%. 1000 milliLiter(s) (100 mL/Hr) IV Continuous <Continuous>  dextrose 50% Injectable 25 Gram(s) IV Push once  dextrose 50% Injectable 12.5 Gram(s) IV Push once  dextrose 50% Injectable 25 Gram(s) IV Push once  ertapenem  IVPB 1000 milliGRAM(s) IV Intermittent every 24 hours  ferrous    sulfate 325 milliGRAM(s) Oral daily  fluticasone propionate/ salmeterol 250-50 MICROgram(s) Diskus 1 Dose(s) Inhalation two times a day  glucagon  Injectable 1 milliGRAM(s) IntraMuscular once  glycopyrrolate 1 milliGRAM(s) Oral three times a day  insulin glargine Injectable (LANTUS) 22 Unit(s) SubCutaneous every morning  insulin glargine Injectable (LANTUS) 28 Unit(s) SubCutaneous at bedtime  insulin lispro (ADMELOG) corrective regimen sliding scale   SubCutaneous three times a day before meals  insulin lispro (ADMELOG) corrective regimen sliding scale   SubCutaneous at bedtime  labetalol 100 milliGRAM(s) Oral every 8 hours  lacosamide 100 milliGRAM(s) Oral two times a day  levETIRAcetam 1000 milliGRAM(s) Oral two times a day  megestrol 20 milliGRAM(s) Oral daily  methylPREDNISolone sodium succinate Injectable 60 milliGRAM(s) IV Push daily  mexiletine 200 milliGRAM(s) Oral three times a day  mineral oil 30 milliLiter(s) Oral daily  montelukast 10 milliGRAM(s) Oral at bedtime  multivitamin 1 Tablet(s) Oral daily  NIFEdipine XL 30 milliGRAM(s) Oral daily  pantoprazole    Tablet 40 milliGRAM(s) Oral before breakfast  polyethylene glycol 3350 17 Gram(s) Oral daily  rosuvastatin 5 milliGRAM(s) Oral at bedtime  senna 2 Tablet(s) Oral at bedtime  sodium chloride 7% Inhalation 4 milliLiter(s) Inhalation every 12 hours  tiotropium 2.5 MICROgram(s) Inhaler 2 Puff(s) Inhalation daily    MEDICATIONS  (PRN):  dextrose Oral Gel 15 Gram(s) Oral once PRN Blood Glucose LESS THAN 70 milliGRAM(s)/deciliter  diphenhydrAMINE 50 milliGRAM(s) Oral daily PRN Rash and/or Itching  sertraline 50 milliGRAM(s) Oral daily PRN for anxiety      CAPILLARY BLOOD GLUCOSE      POCT Blood Glucose.: 297 mg/dL (2025 22:07)  POCT Blood Glucose.: 286 mg/dL (2025 20:16)  POCT Blood Glucose.: 258 mg/dL (2025 19:11)    I&O's Summary    2025 07:01  -  2025 07:00  --------------------------------------------------------  IN: 0 mL / OUT: 650 mL / NET: -650 mL        PHYSICAL EXAM:  Vital Signs Last 24 Hrs  T(C): 36.8 (2025 05:36), Max: 36.9 (2025 12:52)  T(F): 98.3 (2025 05:36), Max: 98.5 (2025 12:52)  HR: 46 (2025 05:36) (45 - 59)  BP: 149/73 (2025 05:36) (122/75 - 149/73)  BP(mean): --  RR: 18 (2025 05:36) (18 - 32)  SpO2: 99% (2025 05:36) (96% - 99%)    Parameters below as of 2025 05:36  Patient On (Oxygen Delivery Method): nasal cannula        GENERAL: NAD, lying in bed comfortably  HEAD: Atraumatic, normocephalic  EYES: EOMI, PERRLA, conjunctiva and sclera clear  ENT: Moist mucous membranes  NECK: Supple, no JVD  HEART: S1, S2, Regular rate and rhythm, no murmurs, rubs, or gallops  LUNGS: Unlabored respirations, clear to auscultation bilaterally, no crackles, wheezing, or rhonchi  ABDOMEN: Soft, nontender, nondistended, +BS  EXTREMITIES: 2+ peripheral pulses bilaterally. No clubbing, cyanosis, or edema  NERVOUS SYSTEM:  A&Ox3, no focal deficits   SKIN: No rashes or lesions    LABS:                        11.0   8.83  )-----------( 292      ( 2025 14:06 )             35.7     04-17    x   |  x   |  x   ----------------------------<  x   See Note   |  x   |  x     Ca    9.3      2025 14:06  Mg     2.2     04-17    TPro  6.8  /  Alb  3.7  /  TBili  0.4  /  DBili  x   /  AST  45[H]  /  ALT  44  /  AlkPhos  92  04-17          Urinalysis Basic - ( 2025 17:19 )    Color: Yellow / Appearance: Clear / S.026 / pH: x  Gluc: x / Ketone: Trace mg/dL  / Bili: Negative / Urobili: 0.2 mg/dL   Blood: x / Protein: Trace mg/dL / Nitrite: Negative   Leuk Esterase: Small / RBC: 5 /HPF / WBC 20 /HPF   Sq Epi: x / Non Sq Epi: 3 /HPF / Bacteria: Negative /HPF          RADIOLOGY & ADDITIONAL TESTS:  Results Reviewed:   Imaging Personally Reviewed:  Electrocardiogram Personally Reviewed:  Tele:

## 2025-04-18 NOTE — DIETITIAN INITIAL EVALUATION ADULT - PERTINENT MEDS FT
MEDICATIONS  (STANDING):  albuterol/ipratropium for Nebulization 3 milliLiter(s) Nebulizer every 6 hours  apixaban 5 milliGRAM(s) Oral every 12 hours  ascorbic acid 500 milliGRAM(s) Oral daily  buDESOnide    Inhalation Suspension 1 milliGRAM(s) Inhalation every 12 hours  clopidogrel Tablet 75 milliGRAM(s) Oral daily  dextrose 5%. 1000 milliLiter(s) (50 mL/Hr) IV Continuous <Continuous>  dextrose 5%. 1000 milliLiter(s) (100 mL/Hr) IV Continuous <Continuous>  dextrose 50% Injectable 25 Gram(s) IV Push once  dextrose 50% Injectable 12.5 Gram(s) IV Push once  dextrose 50% Injectable 25 Gram(s) IV Push once  ertapenem  IVPB 1000 milliGRAM(s) IV Intermittent every 24 hours  ferrous    sulfate 325 milliGRAM(s) Oral daily  fluticasone propionate/ salmeterol 250-50 MICROgram(s) Diskus 1 Dose(s) Inhalation two times a day  glucagon  Injectable 1 milliGRAM(s) IntraMuscular once  glycopyrrolate 1 milliGRAM(s) Oral three times a day  insulin glargine Injectable (LANTUS) 22 Unit(s) SubCutaneous every morning  insulin glargine Injectable (LANTUS) 28 Unit(s) SubCutaneous at bedtime  insulin lispro (ADMELOG) corrective regimen sliding scale   SubCutaneous three times a day before meals  insulin lispro (ADMELOG) corrective regimen sliding scale   SubCutaneous at bedtime  labetalol 100 milliGRAM(s) Oral every 8 hours  lacosamide 100 milliGRAM(s) Oral two times a day  levETIRAcetam 1000 milliGRAM(s) Oral two times a day  magnesium citrate Oral Solution 296 milliLiter(s) Oral every 24 hours  megestrol 20 milliGRAM(s) Oral daily  methylPREDNISolone sodium succinate Injectable 60 milliGRAM(s) IV Push daily  mexiletine 200 milliGRAM(s) Oral three times a day  mineral oil 30 milliLiter(s) Oral daily  montelukast 10 milliGRAM(s) Oral at bedtime  multivitamin 1 Tablet(s) Oral daily  NIFEdipine XL 30 milliGRAM(s) Oral daily  pantoprazole    Tablet 40 milliGRAM(s) Oral before breakfast  polyethylene glycol 3350 17 Gram(s) Oral daily  rosuvastatin 5 milliGRAM(s) Oral at bedtime  senna 2 Tablet(s) Oral at bedtime  sodium chloride 7% Inhalation 4 milliLiter(s) Inhalation every 12 hours  tiotropium 2.5 MICROgram(s) Inhaler 2 Puff(s) Inhalation daily    MEDICATIONS  (PRN):  dextrose Oral Gel 15 Gram(s) Oral once PRN Blood Glucose LESS THAN 70 milliGRAM(s)/deciliter  diphenhydrAMINE 50 milliGRAM(s) Oral daily PRN Rash and/or Itching  sertraline 50 milliGRAM(s) Oral daily PRN for anxiety

## 2025-04-18 NOTE — DIETITIAN INITIAL EVALUATION ADULT - OTHER INFO
Weights:  - UBW (per patient): 155 pounds (~1 month ago)  - Dosing Weight (per chart): 145 pounds (4/17)(bed)  - Daily Weights (per flow sheets): 145 pounds (4/17)(bed)  - Bed Scale Weight (taken by RD): RD unable to obtain bed weight at this time. Pt seated in chair.   - Per [facility] HIE:   RD to continue to monitor weights and trends as able.  Weights:  - UBW (per patient): 155 pounds (~1 month ago)  - Dosing Weight (per chart): 145 pounds (4/17)(bed)  - Daily Weights (per flow sheets): 145 pounds (4/17)(bed)  - Bed Scale Weight (taken by RD): RD unable to obtain bed weight at this time. Pt seated in chair.   - Per [facility] HIE (per last RD note on 11/6/24, HIE note loading): "170lb (10/11/24), 157lb (12/28/23), 143.1lb (08/11/23), 143lb (07/25/23), 140bl (6/4/23), 130.15lb (5/5/23), 135lb (3/19/23), 129lb (2/3/23), 129lb (1/24/23)"  Based on HIE weight from 10/11/24 and current dosing weight in-house, pt with possible 15% wt loss x 6 months, clinically significant. Also noted with 6% wt loss x 6% wt loss x 1 month. RD to continue to monitor weights and trends as able.     Nutritional Concerns:  - Pt noted with COPD (per chart), ordered for Prednisone (per orders). Prednisone may elevate blood glucose levels.  - Pt noted with Type 2 Diabetes (per chart), A1C 7.7% (2/21), Glucose 250 mg/dL (4/18), POCT Glucose 197-327 mg/dL (4/18) and 258-297 mg/dL (4/17). Ordered for insulin in-house (per orders).   - Phosphorous low today (4/18), ordered for sodium phosphate IVPB (per orders).   - Ordered for Vitamin C, Ferrous Sulfate, Mineral Oil, Multivitamin (per orders).   - Ordered for Megace, which can act as an appetite stimulant (per orders).   - Ordered for antibiotics in-house (per orders).  Weights:  - UBW (per patient): 155 pounds (~1 month ago)  - Dosing Weight (per chart): 145 pounds (4/17)(bed)  - Daily Weights (per flow sheets): 145 pounds (4/17)(bed)  - Bed Scale Weight (taken by RD): RD unable to obtain bed weight at this time. Pt seated in chair.   - Per [facility] HIE (per last RD note on 11/6/24, HIE note loading): "170lb (10/11/24), 157lb (12/28/23), 143.1lb (08/11/23), 143lb (07/25/23), 140bl (6/4/23), 130.15lb (5/5/23), 135lb (3/19/23), 129lb (2/3/23), 129lb (1/24/23)"  Based on HIE weight from 10/11/24 and current dosing weight in-house, pt with possible 15% wt loss x 6 months, clinically significant. Also noted with 6% wt loss x 1 month, significant. RD to continue to monitor weights and trends as able.     Nutritional Concerns:  - Pt noted with COPD (per chart), ordered for Prednisone (per orders). Prednisone may elevate blood glucose levels.  - Pt noted with Type 2 Diabetes (per chart), A1C 7.7% (2/21), Glucose 250 mg/dL (4/18), POCT Glucose 197-327 mg/dL (4/18) and 258-297 mg/dL (4/17). Ordered for insulin in-house (per orders).   - Phosphorous low today (4/18), ordered for sodium phosphate IVPB (per orders).   - Ordered for Vitamin C, Ferrous Sulfate, Mineral Oil, Multivitamin (per orders).   - Ordered for Megace, which can act as an appetite stimulant (per orders).   - Ordered for antibiotics in-house (per orders).

## 2025-04-18 NOTE — DIETITIAN INITIAL EVALUATION ADULT - PERSON TAUGHT/METHOD
Provided education on Nutrition Therapy for Type 2 Diabetes. Emphasis on what foods contain carbohydrates, importance of pairing carbohydrates with protein for glycemic control; choosing whole grains vs refined carbohydrates; limiting refined sugars, portion sizes. Discussed importance of glycemic control, and possible effect of steroids on blood glucose levels. Daughter understanding of education.     Educated on the importance of meeting adequate protein-energy needs. Encouraged consumption of protein-rich foods and prioritizing protein foods first at meal times. Encouraged consumption of oral nutrition supplement to optimize protein-energy intake. Obtained food preferences, will honor as able. Pt and daughter aware RD remains available./verbal instruction/written material/patient instructed/daughter instructed

## 2025-04-18 NOTE — PHYSICAL THERAPY INITIAL EVALUATION ADULT - ADDITIONAL COMMENTS
Pt lives in a private house with daughter with 1 1/2 inch step to enter. Pt has a HHA 12 hours a day 7 days a week, daughter and family would supplement the other hours. Pt owns rollator, shower chair and a step bar into the tub. Pt was able to ambulate short distance in the house but required assistance with ADLs.

## 2025-04-18 NOTE — PROGRESS NOTE ADULT - PROBLEM SELECTOR PLAN 4
- pt presented with multiple falls in the past month     Plan:   - f/u infectious work up as mentioned above   - PT consult   - nutrition consult  - check orthostatic VS

## 2025-04-18 NOTE — ADVANCED PRACTICE NURSE CONSULT - REASON FOR CONSULT
Midline Catheter Insertion Note- Placed 4/18     Catheter type: 4F  : Bard  Power Injectable: Yes  Ref#: [id number]  Procedure assisted by: saul callejas RN     Time out was preformed, confirming the patient's first and last name, date of birth, procedure, and correct site prior to state of procedure.  Patient was placed with HOB 30 degrees. Patient placement site was prepped with chlorhexidine solution, then draped using maximum sterile barrier protection. Using the Bard Site Rite 8, the catheter was placed using the Modified Seldinger Technique. The area was injected with 2 ml of 1% lidocaine. Using the ultrasound, the catheter was introduced. Strict adherence to outline aseptic technique including handwashing, glove and gown, utilizing mask and cap, plus draping the patient with a sterile drape was observed. Upon completion of line placement, the insertion site was covered with a sterile occlusive CHG dressing. Pt tolerated procedure well.   All materials used for catheter insertion, including the intact guide wires, were accounted for at the end of the procedure. pt educated on midline care. gauze applied to outside of dressing due to bleeding. pt safety maintained    Number of attempts: 1   Complications/Comments: no  Emergency Placement: no     Site: right upper arm   Anatomical Site of insertion: right brachial vein    Catheter size/length: 4fr 20cm   US guided Bard SL Power MIDLINE placed

## 2025-04-18 NOTE — PROGRESS NOTE ADULT - PROBLEM SELECTOR PLAN 1
- pt reports she had generalized body shaking when she was diagnosed with seizure   - visual hallucination "seeing sister and  when they are not there" is a new occurrence that started last Friday, denies auditory hallucination   - pt is AOX3 (at baseline mental status) with no other mood/psychotic symptoms and hallucination is different from prior seizure symptoms, lower concern for psychiatric or seizure etiology for this new onset visual hallucination     Plan:   - send infectious workup to r/o infectious etiology of hallucination   - f/u BCx, UCx, procalcitonin   - pending MRI brain w/wo   - CTM

## 2025-04-18 NOTE — CONSULT NOTE ADULT - SUBJECTIVE AND OBJECTIVE BOX
Neurology - Consult Note    -  Spectra: [phone] ([facility]), [phone] ([facility])  -    HPI: Patient RAYRAY RODRIGUEZ is a 74y (1950) woman with a PMHx significant for epilepsy, COPD, asthma, DM2, bronchiectasis, tracheomalacia s/p tracheloplasty, HTN, aortic dissection, aortic aneurysm, severe AS s/p TAVR, DVT, AFib, and colon cancer s/p resection with colostomy creation, admitted for worsening SOB, neuro consulted for pt complaining of recent hallucinations. Pt reports that she was lying in her room two nights ago watching cartoons, when she saw a tall figure in the corner of the room, who asked her to put on a mask. Pt turned her head briefly and when she turned back, the person was no longer there. Pt states she knows this person was not real but she feels it was probably something spiritual rather than a hallucination. Pt reports that she is very spiritual and is a practicing 7 day [employer]. Pt's sister was very spiritual as well and would talk to  spirits throughout her life, including their  parents. Pt reports her sister passed a few months ago and since then she has been able to feel her presence a few times, but she does not feel that she has ever had a hallucination. Pt also reports she has had similar spiritual events with her , who passed 20 years ago. Pt reports that she has felt his presence when looking at his ashes from time to time. Pt denies any auditory hallucinations, reports that even when she feels her sisters presence there are no voices associated with anything.       Review of Systems:    RESPIRATORY: + SOB  NEUROLOGICAL: +As stated in HPI above  All other review of systems is negative unless indicated above.    Allergies:  aspirin (Unknown)  codeine (Unknown)  codeine (Short breath)  Valium (Unknown)  cefepime (Short breath (Severe))  shellfish (Anaphylaxis)  penicillin (Anaphylaxis)  cefepime (Anaphylaxis)  penicillin (Unknown)  Percocet (Unknown)  Avelox (Short breath; Pruritus)  Dilaudid (Short breath)  ampicillin (Unknown)  Valium (Short breath)  OxyContin (Unknown)  aspirin (Short breath)  iodine (Short breath; Swelling)  tetanus toxoid (Short breath)  Avelox (Unknown)  meropenem (Unknown)      PMHx/PSHx/Family Hx: As above, otherwise see below   Atrial fibrillation    Diabetes    Hypertension    COPD (chronic obstructive pulmonary disease)    Adrenal insufficiency    Aortic insufficiency    Pelvic fracture    Asthma    Hypertension    Tracheobronchomalacia    Colorectal cancer    Aortic disease    Rectal bleeding    Seizure    DVT (deep venous thrombosis)    TIA (transient ischemic attack)    COPD (chronic obstructive pulmonary disease)    Atrial fibrillation    History of COPD    Afib    Aortic valve replaced    Epilepsy    Asthma    DM (diabetes mellitus)    History of seizure disorder    History of TIAs    HTN (hypertension)    Bronchiectasis    Colon cancer        Social Hx:  No current use of tobacco, alcohol, or illicit drugs  Lives with daughter in private residence. Originally from [place].    Medications:  MEDICATIONS  (STANDING):  albuterol/ipratropium for Nebulization 3 milliLiter(s) Nebulizer every 6 hours  apixaban 5 milliGRAM(s) Oral every 12 hours  ascorbic acid 500 milliGRAM(s) Oral daily  buDESOnide    Inhalation Suspension 1 milliGRAM(s) Inhalation every 12 hours  clopidogrel Tablet 75 milliGRAM(s) Oral daily  dextrose 5%. 1000 milliLiter(s) (50 mL/Hr) IV Continuous <Continuous>  dextrose 5%. 1000 milliLiter(s) (100 mL/Hr) IV Continuous <Continuous>  dextrose 50% Injectable 25 Gram(s) IV Push once  dextrose 50% Injectable 12.5 Gram(s) IV Push once  dextrose 50% Injectable 25 Gram(s) IV Push once  ferrous    sulfate 325 milliGRAM(s) Oral daily  fluticasone propionate/ salmeterol 250-50 MICROgram(s) Diskus 1 Dose(s) Inhalation two times a day  glucagon  Injectable 1 milliGRAM(s) IntraMuscular once  glycopyrrolate 1 milliGRAM(s) Oral three times a day  insulin glargine Injectable (LANTUS) 22 Unit(s) SubCutaneous every morning  insulin glargine Injectable (LANTUS) 28 Unit(s) SubCutaneous at bedtime  insulin lispro (ADMELOG) corrective regimen sliding scale   SubCutaneous three times a day before meals  insulin lispro (ADMELOG) corrective regimen sliding scale   SubCutaneous at bedtime  labetalol 100 milliGRAM(s) Oral every 8 hours  lacosamide 100 milliGRAM(s) Oral two times a day  levETIRAcetam 1000 milliGRAM(s) Oral two times a day  megestrol 20 milliGRAM(s) Oral daily  mexiletine 200 milliGRAM(s) Oral three times a day  mineral oil 30 milliLiter(s) Oral daily  montelukast 10 milliGRAM(s) Oral at bedtime  multivitamin 1 Tablet(s) Oral daily  NIFEdipine XL 30 milliGRAM(s) Oral daily  pantoprazole    Tablet 40 milliGRAM(s) Oral before breakfast  polyethylene glycol 3350 17 Gram(s) Oral daily  rosuvastatin 5 milliGRAM(s) Oral at bedtime  senna 2 Tablet(s) Oral at bedtime  sodium chloride 7% Inhalation 4 milliLiter(s) Inhalation every 12 hours  tiotropium 2.5 MICROgram(s) Inhaler 2 Puff(s) Inhalation daily    MEDICATIONS  (PRN):  dextrose Oral Gel 15 Gram(s) Oral once PRN Blood Glucose LESS THAN 70 milliGRAM(s)/deciliter  magnesium citrate Oral Solution 296 milliLiter(s) Oral every 24 hours PRN Constipation  sertraline 50 milliGRAM(s) Oral daily PRN for anxiety      Vitals:  T(C): 36.9 (25 @ 12:00), Max: 36.9 (25 @ 12:00)  HR: 54 (25 @ 12:00) (46 - 59)  BP: 142/58 (25 @ 12:00) (142/58 - 153/75)  RR: 18 (25 @ 12:00) (18 - 26)  SpO2: 92% (25 @ 12:00) (92% - 99%)    Physical Examination:   General - NAD  Cardiovascular - Peripheral pulses palpable, no edema  Eyes - Pt has prosthetic left eye. EOMI for right eye. Right pupil reacts normally.   Neurologic Exam:  Mental status - Awake, Alert, Oriented to person, place, and time. Speech fluent, repetition and naming intact. Follows simple and complex commands. Attention/concentration, recent and remote memory (including registration and recall), and fund of knowledge intact    Cranial nerves - PERRLA  VFF, EOMI, face sensation (V1-V3) intact b/l, facial strength intact without asymmetry b/l, hearing intact b/l, palate with symmetric elevation, trapezius OR sternocleidomastoid 5/5 strength b/l, tongue midline on protrusion with full lateral movement    Motor - Normal bulk and tone throughout. No pronator drift.  Strength testing            Deltoid      Biceps      Triceps     Wrist Extension    Wrist Flexion     Interossei         R            5                 5               5                     5                              5                        5                 5  L             5                 5               5                     5                              5                        5                 5              Hip Flexion    Hip Extension    Knee Flexion    Knee Extension    Dorsiflexion    Plantar Flexion  R              5                           5                       5                           5                            5                          5  L              5                           5                        5                           5                            5                          5    Sensation - Light touch/temperature OR pain/vibration intact throughout    DTR's -             Biceps      Triceps     Brachioradialis      Patellar    Ankle    Toes/plantar response  R             2+             2+                  2+                       2+            2+                 Down  L              2+             2+                 2+                        2+           2+                 Down    Coordination - Finger to Nose intact b/l. No tremors appreciated    Gait and station - Normal casual gait. Romberg (-)    Labs:                        10.9   10.55 )-----------( 284      ( 2025 10:27 )             35.2     04-18    138  |  104  |  16  ----------------------------<  250[H]  4.8   |  23  |  0.58    Ca    9.1      2025 10:27  Phos  1.9     04-18  Mg     2.2     04-18    TPro  6.4  /  Alb  3.6  /  TBili  0.2  /  DBili  x   /  AST  19  /  ALT  31  /  AlkPhos  90  04-18    CAPILLARY BLOOD GLUCOSE      POCT Blood Glucose.: 197 mg/dL (2025 14:10)    LIVER FUNCTIONS - ( 2025 10:27 )  Alb: 3.6 g/dL / Pro: 6.4 g/dL / ALK PHOS: 90 U/L / ALT: 31 U/L / AST: 19 U/L / GGT: x               CSF:                  Radiology:     Neurology - Consult Note    -  Spectra: [phone] ([facility]), [phone] ([facility])  -    HPI: Patient RAYRAY RODRIGUEZ is a 74y (1950) woman with a PMHx significant for epilepsy, COPD, asthma, DM2, bronchiectasis, tracheomalacia s/p tracheloplasty, HTN, aortic dissection, aortic aneurysm, severe AS s/p TAVR, DVT, AFib, and colon cancer s/p resection with colostomy creation, admitted for worsening SOB, neuro consulted for pt complaining of recent hallucinations. Pt reports that she was lying in her room two nights ago watching cartoons, when she saw a tall figure in the corner of the room, who asked her to put on a mask. Pt turned her head briefly and when she turned back, the person was no longer there. Pt states she knows this person was not real but she feels it was probably something spiritual rather than a hallucination. Pt reports that she is very spiritual and is a practicing 7 day [employer]. Pt's sister was very spiritual as well and would talk to  spirits throughout her life, including their  parents. Pt reports her sister passed a few months ago and since then she has been able to feel her presence a few times, but she does not feel that she has ever had a hallucination. Pt also reports she has had similar spiritual events with her , who passed 20 years ago. Pt reports that she has felt his presence when looking at his ashes from time to time. Pt denies any auditory hallucinations, reports that even when she feels her sisters presence there are no voices associated with anything.     Pt also reports some subjective cognitive difficulties over the past 2-3 years, feels that she occasionally can't find the right word to say when speaking. Pt still able to complete crossword puzzles and reads extensively in her free time.       Review of Systems:    RESPIRATORY: + SOB  NEUROLOGICAL: +As stated in HPI above  All other review of systems is negative unless indicated above.    Allergies:  aspirin (Unknown)  codeine (Unknown)  codeine (Short breath)  Valium (Unknown)  cefepime (Short breath (Severe))  shellfish (Anaphylaxis)  penicillin (Anaphylaxis)  cefepime (Anaphylaxis)  penicillin (Unknown)  Percocet (Unknown)  Avelox (Short breath; Pruritus)  Dilaudid (Short breath)  ampicillin (Unknown)  Valium (Short breath)  OxyContin (Unknown)  aspirin (Short breath)  iodine (Short breath; Swelling)  tetanus toxoid (Short breath)  Avelox (Unknown)  meropenem (Unknown)      PMHx/PSHx/Family Hx: As above, otherwise see below   Atrial fibrillation    Diabetes    Hypertension    COPD (chronic obstructive pulmonary disease)    Adrenal insufficiency    Aortic insufficiency    Pelvic fracture    Asthma    Hypertension    Tracheobronchomalacia    Colorectal cancer    Aortic disease    Rectal bleeding    Seizure    DVT (deep venous thrombosis)    TIA (transient ischemic attack)    COPD (chronic obstructive pulmonary disease)    Atrial fibrillation    History of COPD    Afib    Aortic valve replaced    Epilepsy    Asthma    DM (diabetes mellitus)    History of seizure disorder    History of TIAs    HTN (hypertension)    Bronchiectasis    Colon cancer        Social Hx:  No current use of tobacco, alcohol, or illicit drugs  Lives with daughter in private residence. Originally from [place].    Medications:  MEDICATIONS  (STANDING):  albuterol/ipratropium for Nebulization 3 milliLiter(s) Nebulizer every 6 hours  apixaban 5 milliGRAM(s) Oral every 12 hours  ascorbic acid 500 milliGRAM(s) Oral daily  buDESOnide    Inhalation Suspension 1 milliGRAM(s) Inhalation every 12 hours  clopidogrel Tablet 75 milliGRAM(s) Oral daily  dextrose 5%. 1000 milliLiter(s) (50 mL/Hr) IV Continuous <Continuous>  dextrose 5%. 1000 milliLiter(s) (100 mL/Hr) IV Continuous <Continuous>  dextrose 50% Injectable 25 Gram(s) IV Push once  dextrose 50% Injectable 12.5 Gram(s) IV Push once  dextrose 50% Injectable 25 Gram(s) IV Push once  ferrous    sulfate 325 milliGRAM(s) Oral daily  fluticasone propionate/ salmeterol 250-50 MICROgram(s) Diskus 1 Dose(s) Inhalation two times a day  glucagon  Injectable 1 milliGRAM(s) IntraMuscular once  glycopyrrolate 1 milliGRAM(s) Oral three times a day  insulin glargine Injectable (LANTUS) 22 Unit(s) SubCutaneous every morning  insulin glargine Injectable (LANTUS) 28 Unit(s) SubCutaneous at bedtime  insulin lispro (ADMELOG) corrective regimen sliding scale   SubCutaneous three times a day before meals  insulin lispro (ADMELOG) corrective regimen sliding scale   SubCutaneous at bedtime  labetalol 100 milliGRAM(s) Oral every 8 hours  lacosamide 100 milliGRAM(s) Oral two times a day  levETIRAcetam 1000 milliGRAM(s) Oral two times a day  megestrol 20 milliGRAM(s) Oral daily  mexiletine 200 milliGRAM(s) Oral three times a day  mineral oil 30 milliLiter(s) Oral daily  montelukast 10 milliGRAM(s) Oral at bedtime  multivitamin 1 Tablet(s) Oral daily  NIFEdipine XL 30 milliGRAM(s) Oral daily  pantoprazole    Tablet 40 milliGRAM(s) Oral before breakfast  polyethylene glycol 3350 17 Gram(s) Oral daily  rosuvastatin 5 milliGRAM(s) Oral at bedtime  senna 2 Tablet(s) Oral at bedtime  sodium chloride 7% Inhalation 4 milliLiter(s) Inhalation every 12 hours  tiotropium 2.5 MICROgram(s) Inhaler 2 Puff(s) Inhalation daily    MEDICATIONS  (PRN):  dextrose Oral Gel 15 Gram(s) Oral once PRN Blood Glucose LESS THAN 70 milliGRAM(s)/deciliter  magnesium citrate Oral Solution 296 milliLiter(s) Oral every 24 hours PRN Constipation  sertraline 50 milliGRAM(s) Oral daily PRN for anxiety      Vitals:  T(C): 36.9 (25 @ 12:00), Max: 36.9 (25 @ 12:00)  HR: 54 (25 @ 12:00) (46 - 59)  BP: 142/58 (25 @ 12:00) (142/58 - 153/75)  RR: 18 (25 @ 12:00) (18 - 26)  SpO2: 92% (25 @ 12:00) (92% - 99%)    Physical Examination:   General - NAD  Cardiovascular - Peripheral pulses palpable, no edema  Eyes - Pt has prosthetic left eye.   Neurologic Exam:  Mental status - Awake, Alert, Oriented to person, place, and time. Speech fluent, repetition and naming intact. Follows simple and complex commands. Attention/concentration, recent and remote memory (including registration and recall), and fund of knowledge intact    Cranial nerves - PERRLA, VFF, EOMI for R eye (pt has prosthetic L eye), face sensation (V1-V3) intact b/l, patch of abnormal sensation on right cheek c/w prior surgery in that area, facial strength intact without asymmetry b/l, hearing intact b/l, palate with symmetric elevation, trapezius 5/5 strength b/l, tongue midline on protrusion with full lateral movement    Motor - Normal bulk and tone throughout. No pronator drift.  Strength testing            Deltoid      Biceps      Triceps     Wrist Extension    Wrist Flexion     Interossei         R            5                 5               5                     5                              5                        5                 5  L             5                 5               5                     5                              5                        5                 5              Hip Flexion    Hip Extension    Knee Flexion    Knee Extension    Dorsiflexion    Plantar Flexion  R              5                           5                       5                           5                            5                          5  L              5                           5                        5                           5                            5                          5    Sensation - Light touch/temperature OR pain/vibration intact throughout    DTR's -             Biceps      Triceps     Brachioradialis      Patellar    Ankle    Toes/plantar response  R             2+             2+                  2+                       1+            1+                 Down  L              2+             2+                 2+                        1+           1+                 Down    Coordination - Finger to Nose intact b/l. Mild intention tremor in R UE.    Gait and station - Normal casual gait, mildly impaired balance. Romberg (-)    Labs:                        10.9   10.55 )-----------( 284      ( 2025 10:27 )             35.2     04-18    138  |  104  |  16  ----------------------------<  250[H]  4.8   |  23  |  0.58    Ca    9.1      2025 10:27  Phos  1.9     04-18  Mg     2.2     04-18    TPro  6.4  /  Alb  3.6  /  TBili  0.2  /  DBili  x   /  AST  19  /  ALT  31  /  AlkPhos  90  04-18    CAPILLARY BLOOD GLUCOSE      POCT Blood Glucose.: 197 mg/dL (2025 14:10)    LIVER FUNCTIONS - ( 2025 10:27 )  Alb: 3.6 g/dL / Pro: 6.4 g/dL / ALK PHOS: 90 U/L / ALT: 31 U/L / AST: 19 U/L / GGT: x               CSF:                  Radiology:

## 2025-04-18 NOTE — DIETITIAN INITIAL EVALUATION ADULT - ADD RECOMMEND
1) Recommend Consistent Carbohydrate, Low Sodium diet as tolerated. Defer diet texture/consistency to Medical Team.   2) Recommend adding Glucerna 3x/day to optimize protein-energy intake (also per daughter and pt request).   3) Recommend continue Multivitamin daily for micronutrient coverage unless contraindicated.   4) Monitor and encourage adequate PO intake, RD obtained food preferences to optimize PO intake, will honor as able. Pt with menu at bedside.   5) Monitor nutritional intake, tolerance to diet prescription, bowel movements, weights, labs, and skin integrity.   6) Monitor electrolytes, replete as needed. Monitor glucose fingersticks in setting of DM.   7) Malnutrition sticker placed in chart.

## 2025-04-18 NOTE — BH CONSULTATION LIAISON ASSESSMENT NOTE - DESCRIPTION
Retired   Domiciled with daughter in private residence  Denies use of alcohol, tobacco, or any other drug use

## 2025-04-18 NOTE — PHYSICAL THERAPY INITIAL EVALUATION ADULT - ACTIVE RANGE OF MOTION EXAMINATION, REHAB EVAL
barry. upper extremity Active ROM was WNL (within normal limits)/bilateral lower extremity Active ROM was WNL (within normal limits)

## 2025-04-18 NOTE — BH CONSULTATION LIAISON ASSESSMENT NOTE - NSUNABLEASSESSPROTRISKCOMMENT_PSY_ALL_CORE
Patient has a therapeutic relationship with daughter who the patient lives with. Patient identifies that suicide is immoral.

## 2025-04-18 NOTE — PROGRESS NOTE ADULT - PROBLEM SELECTOR PLAN 8
- Pt takes lantus 22 in AM, 28 at night    Plan:   - check A1C   - place pt back at home dose given likely will have hyperglycemia from steroid - lantus 22 in AM and 28 at night   - FS premeal and qhs   - nutrition c/s

## 2025-04-18 NOTE — BH CONSULTATION LIAISON ASSESSMENT NOTE - RISK ASSESSMENT
Patient has good insight and has therapeutic relationship with her daughter whom she lives with. Patient denies any suicidal or homicidal ideation or prior attempts. Patient is compliant and cooperative with treatment. Patient is a low risk for all dangerous behaviors. Risk factors: acute illness    Protective factors: no current SI/HI, no h/o SA/SIB, no h/o psych admissions, no access to weapons, no active substance abuse, domiciled, social supports, compliant with treatment, help-seeking behaviors    Overall, pt is a low risk of harm to self/others and does not meet criteria for psychiatric admission.

## 2025-04-18 NOTE — DIETITIAN INITIAL EVALUATION ADULT - NSICDXPASTSURGICALHX_GEN_ALL_CORE_FT
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    H/O pelvic surgery 5 years ago - s/p fracture    H/O total knee replacement, bilateral 5 years ago    History of partial hysterectomy 30 years ago - fibroids    History of sinus surgery multiple sinus surgeries    History of tracheomalacia 2015 - attempted tracheal stenting ([facility])- course complicated by obstruction, respiratory failure, multiple CPR attempts -  stent discontinued; 10/20/2016 Tracheobronchoplasty (Prolene Mesh) performed at [facility] by Dr Zapien    Rectal bleeding exam under anesthesia ([facility]) 2/2018    S/P aortic valve replacement     S/P AVR (aortic valve replacement)     S/P bronchoscopy 6/5/2018 - [place] (Dr Zapien) no evidence of tracheobronchomalacia in trachea or bronchial tubes    S/P colostomy     S/P TAVR (transcatheter aortic valve replacement)

## 2025-04-18 NOTE — BH CONSULTATION LIAISON ASSESSMENT NOTE - NSICDXPASTSURGICALHX_GEN_ALL_CORE_FT
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 by Dr Zapien    Rectal bleeding exam under anesthesia ([facility]) 2/2018    S/P aortic valve replacement     S/P AVR (aortic valve replacement)     S/P bronchoscopy 6/5/2018 - [place] (Dr Zapien) no evidence of tracheobronchomalacia in trachea or bronchial tubes    S/P colostomy     S/P TAVR (transcatheter aortic valve replacement)

## 2025-04-18 NOTE — ADVANCED PRACTICE NURSE CONSULT - REASON FOR CONSULT
Vascular Access Team    Evaluation for: Bedside Midline placement  Requested by name: Pepito Gonzalez  Date/Time: 4/18@11:09    Indication: access  Allergy to CHG or Heparin or Lidocaine: no    Anticoagulants/ antiplatelets: eliquis, plavix    Platelets(>20): 284  INR(<3): 1.46  eGFR(>40): 94  Blood cultures sent: n/a    Arm restrictions: no    Consent obtained: n/a    Comments: Bedside midline order evaluated. Please, call [phone] for the VAT RN with any questions.

## 2025-04-18 NOTE — BH CONSULTATION LIAISON ASSESSMENT NOTE - HPI (INCLUDE ILLNESS QUALITY, SEVERITY, DURATION, TIMING, CONTEXT, MODIFYING FACTORS, ASSOCIATED SIGNS AND SYMPTOMS)
Patient is a 74 year old female domiciled with daughter in a private residence with no significant PPHx with PMHx epilepsy, COPD, asthma, DM2, bronchiectasis, tracheomalacia s/p tracheloplasty, HTN, aortic dissection, aortic aneurysm, severe AS s/p TAVR, DVT, AFib, and colon cancer s/p resection with colostomy creation admitted for asthma/COPD exacerbation and hallucinations.     The patient reports that starting a two weeks ago, she felt the presence of her late mother, who passed a year ago, and the feeling occurred once or twice. In addition, she reports being able to smell her late 's, who passed a few months ago, cologne occasionally. She admits that she is aware that these are hallucinations and reminds herself that they are not real. She states that the hallucinations do not cause her significant distress. She denies any visual or auditory hallucinations. During these hallucinations, she denies any somatic symptoms including nausea, automatisms, and light-headedness. Patient admits that her sister also has a history of tactile hallucinations of their late parents.     She denies any depressive symptoms or feelings of hopelessness in the past two weeks. She denies any sleep disturbances or changes in appetite and energy level. She denies any suicidal or homicidal ideation or attempts. Of note, patient was previously prescribed sertraline by her PCP for anxiety a few months ago but reports that she has not taken it recently.

## 2025-04-18 NOTE — DIETITIAN INITIAL EVALUATION ADULT - OTHER CALCULATIONS
Estimated protein-energy needs calculated using dosing weight of 145 pounds (4/17) with consideration for malnutrition.   Defer fluid calculations to the medical team.

## 2025-04-18 NOTE — DIETITIAN INITIAL EVALUATION ADULT - ENERGY INTAKE
Adequate (%) Currently in-house, pt noted with improved appetite/po intake. Per nursing flow sheets, pt noted to have consumed % of breakfast tray today. Limited intake available to assess at this time. Pt consumed 1x Glucerna supplement this morning with breakfast (brought from home). Pt and daughter requesting Glucerna (Vanilla ONLY) 3x/day in-house to optimize po intake. RD obtained food preferences to optimize PO intake, will honor as able. Pt and daughter requesting for foods to be pre-cut for ease of eating, RD to honor as able. Daughter and pt with a menu at bedside, ordering down for meals.

## 2025-04-18 NOTE — PROGRESS NOTE ADULT - PROBLEM SELECTOR PLAN 5
- pt reports having generalized body shaking when she had seizure in the past   - takes keppra 1000 mg BID, lacosamide 100 mg BID at home     Plan:   - c/w Keppra 1000 mg BID   - c/w lacosamide 100 mg BID  - seizure precaution   - aspiration precaution

## 2025-04-18 NOTE — PHYSICAL THERAPY INITIAL EVALUATION ADULT - PLANNED THERAPY INTERVENTIONS, PT EVAL
GOAL: Pt will be able to do 1 step with minimal assistance with RW./balance training/bed mobility training/gait training/strengthening/transfer training

## 2025-04-18 NOTE — BH CONSULTATION LIAISON ASSESSMENT NOTE - SUMMARY
Patient is a 74 year old female domiciled with daughter in a private residence with no significant PPHx with PMHx epilepsy, COPD, asthma, DM2, bronchiectasis, tracheomalacia s/p tracheloplasty, HTN, aortic dissection, aortic aneurysm, severe AS s/p TAVR, DVT, AFib, and colon cancer s/p resection with colostomy creation admitted for asthma/COPD exacerbation, consulted for further evaluation of two weeks of hallucinations. Given her COPD/asthma exacerbation, patient was managed with glucocorticoids which may contribute to onset of hallucinations in conjunction with history of multiple  family members in the past year.     Plan  - In the absence of acute distress, no further psychiatric management is indicated at this time  - Discuss with primary team to adjust medication regimen

## 2025-04-18 NOTE — DIETITIAN INITIAL EVALUATION ADULT - LITERATURE/VIDEOS GIVEN
Carbohydrate Counting for People With Diabetes  Diabetes Label Reading Tips   Plate Method for Diabetes

## 2025-04-18 NOTE — PHYSICAL THERAPY INITIAL EVALUATION ADULT - PERTINENT HX OF CURRENT PROBLEM, REHAB EVAL
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 Antonio, Type A dissection s/p bioAVR with Bental procedure (9/2022), severe AS s/p TAVR (9/10/2023), TIA's, DVT, Afib on Eliquis,  Colon cancer S/P resection, colostomy bag, neuropathy, Midline for IV ABX placed 2 weeks ago for chronic "TB like" pneumonia on ertapenmen (today is supposed to be the last dose at 6pm) presenting today for worsening shortness of breath over the last 7 days. Per daughter, pt does not use inhaler Q4 as instructed and when it happens, pt develops SOB even with just 5 meters of walking. Pt states that she has fall 3-4 times in the last month, with last episode this morning 5AM when she was walking to the bathroom. She felt like her legs were giving up, denies any dizziness, headstrike, injury to any body parts, LOC, CP, palpitation. She also endorse intermittent new onset visual hallucination (seeing  and sister when they are not there) starting Friday 4/11. She reports having chronic cough due to asthma but has also been having burning on urination. Denies fever, recent illness, abd pain, n/v/d. Hospital stay: 4/17 CXR: trace right effusion. 4/17 focused US: no pericardial effusion. 4/17: CT chest: Mild bilateral interlobular septal thickening. Trace left effusion. In a setting of cardiomegaly, this may represent pulmonary venous hypertensive congestive changes. Scattered bilateral sub-6 mm pulmonary nodules may be reactive in nature. Recommend follow-up chest CT in 8-12 weeks to evaluate for   resolution.

## 2025-04-18 NOTE — BH CONSULTATION LIAISON ASSESSMENT NOTE - NSBHCHARTREVIEWVS_PSY_A_CORE FT
Vital Signs Last 24 Hrs  T(C): 36.9 (18 Apr 2025 12:00), Max: 36.9 (18 Apr 2025 12:00)  T(F): 98.4 (18 Apr 2025 12:00), Max: 98.4 (18 Apr 2025 12:00)  HR: 54 (18 Apr 2025 12:00) (45 - 59)  BP: 142/58 (18 Apr 2025 12:00) (136/60 - 153/75)  BP(mean): --  RR: 18 (18 Apr 2025 12:00) (18 - 32)  SpO2: 92% (18 Apr 2025 12:00) (92% - 99%)    Parameters below as of 18 Apr 2025 12:00  Patient On (Oxygen Delivery Method): room air

## 2025-04-18 NOTE — BH CONSULTATION LIAISON ASSESSMENT NOTE - NSBHCONSULTPRIMARYDISCUSSYES_PSY_A_CORE FT
Hallucinations likely secondary to medications vs complicated grief reaction. Recommend adjusting medication regimen at this time.  Hallucinations likely secondary to medications vs complicated grief reaction.

## 2025-04-18 NOTE — BH CONSULTATION LIAISON ASSESSMENT NOTE - NSBHATTESTCOMMENTATTENDFT_PSY_A_CORE
Patient is a 74 year old , domiciled, retired , woman with no past psychiatric history and a medical history significant for epilepsy on Keppra, HTN, COPD, DMII, asthma, tracheomalacia s/p tracheoplasty, aortic dissection, aortic aneurysm, severe AS s/p TAVR, DVT, AFib, and colon cancer s/p resection with colostomy who is currently admitted for worsening SOB/COPD exacerbation and syncopal episode despite 2 week antibiotic treatment for pneumonia. Psychiatry consulted for evaluation of new onset visual hallucinations. On exam patient is alert, cooperative, fully oriented, and able to complete attention tasks, without apparent signs of delirium. Patient reports over the past couple of weeks she has experienced some sensations of people in her life that have passed away. States at times she has been able to smell her  's cologne. States another time she felt the presence of her  sister. Last night she saw what looked like a woman by her doorway. States she has not had any auditory hallucinations. She is aware these are not real and is not particularly bothered by them - tend to happen at night and she just goes back to sleep. States she feels they are likely due to the medications she's been taking, though notes that her sister used to be visited by people who had passed. Patient denies feeling depressed. No history of sylvia. No prior history of any hallucinations or other symptoms of psychosis. No history of mental health treatment. No history of substance abuse. Work-up including head imaging is still pending. Based on current evaluation and available information, patient's symptoms and history are not consistent with a primary psychiatric disorder. Would consider delirium as a cause, but patient does not currently appear to be delirious. Recommend head imaging to rule out acute event/mass. Also possible that symptoms were medication-induced from steroids. Also possible, given the hallucinations were related to  members from her family, that it related to grief and perhaps culturally appropriate given her sister had similar experiences. Do not think treatment with antipsychotic medication is warranted at this time given rare symptoms and lack of distress for the patient.  Would reconsider if symptoms worsen or become more distressing.

## 2025-04-18 NOTE — CONSULT NOTE ADULT - ASSESSMENT
Patient is a 75 yo F w/ asthma (chronic methypred 8mg PO daily), bronchiectasis, allergic rhinitis, recurrent PNA, SDB ( on CPAP), tracheomalacia s/p tracheoplasty, tracheobronchomalacia, pAfib (Eliquis), colorectal ca s/p colostomy, aortic dissection s/p ascending aorta repair who p/w AMS and hallucinations x 1 week as well as acute on chronic SOB x 2 weeks. As per patient and daughter at bedside, hallucinations started on Friday. Was instructed to obtain urine sample but was unable to. Patient also had been without her chest vest as awaiting a new one that arrive just earlier this week and so has not been able to do her usual airway clearance regimen. Endorses reduced ET over the past 5 days. Denies any fevers.    Labs notable for mild leukocytoisis WBC 10.55, Hb 10.9, VBG 7.4/43/70, UA slightly positive.    CT chest notable for mild bilateral interlobular septal thickening, trace left effusion as well as scattered bilateral sub-6 mm pulmonary nodules    Home Airway clearance regimen: Albuterol q6h -> Chest Vest -> Perforomist BID  -> Pulmicort BID, - > Ohtuvayre 2.5 BID - > HyperSal 7% q12h. Patient also on Breo Ellipta 200 at 1 inhalation QD, Incruse Ellipta 1 puff QD, and on Tezspire airway     #SOB - Likely 2/2 mild COPD/asthma exacerbation  #Acute Asthma exacerbation  #SDB - on CPAP  - c/w Airway clearance regimen as possible Albuterol q6h -> Chest Vest -> Perforomist BID (patient to bring in) -> Pulmicort BID, - > Ohtuvayre 2.5 BID (patient to bring in) - > HyperSal 7% q12h  - Patient also on Breo Ellipta 200 at 1 inhalation QD, Incruse Ellipta 1 puff QD. Can use Advair or   - Tezspire outpatient   - Please have biomed clear home CPAP and Chest Vest for use  - Check full RVP  - Can decrease to Pred 40mg PO daily for now  - PT/OT, OOB to chair as much as possible    Shaan Shah MD  Pulmonary & Critical Care

## 2025-04-18 NOTE — CONSULT NOTE ADULT - TIME BILLING
Medical management as above, reviewing chart and coordinating care with primary team/staff, as well as reviewing vitals, radiology, medication list, recent labs, and prior records.    Does not include teaching time.
chart review, examination, and discussion of plan with patient and team

## 2025-04-18 NOTE — PROGRESS NOTE ADULT - PROBLEM SELECTOR PLAN 3
- per pt, she has been on Ertapenem for TB like PNA, last dose should be today per daughter     Plan:   - c/w Ertapenem qd for now   - check sputum Cx   - give benadryl 50 mg PO prior to Ertapenem  - ID c/s in AM   - pulm c/s in AM

## 2025-04-18 NOTE — CONSULT NOTE ADULT - ASSESSMENT
74y (1950) woman with a PMHx significant for epilepsy, COPD, asthma, DM2, HTN, aortic dissection, aortic aneurysm, severe AS s/p TAVR, DVT, AFib, and colon cancer s/p resection with colostomy creation, admitted for worsening SOB, neuro consulted for pt complaining of recent hallucinations. Pt had a single episode of VH in the form of a tall figure or "possible family member" in the corner of her room as she was falling asleep, also reports multiple episodes over the past year of feeling the presence of her   and sister, which is consistent with pt's spiritual beliefs and are not distressing. PE was unremarkable for any significant neurological deficits. Pt had no reported visual or auditory hallucinations at time of interview. Pt's recent episode is likely consistent with a hypnagogic hallucination, no further neuro workup or treatment for hallucinations required at this time. Per pt's outpatient neurologist, her subjective cognitive difficulties is likely consistent with her poorly controlled temporal epilepsy.    #visual hallucinations  Pt reports single episode of seeing person in her room before bedtime, likely c/w hypnagogic hallucination. Also endorses occasionally feeling presence of her  /sister, c/w spiritual background.   -no tx at this time, contact neuro with any further questions    #epilepsy  Hx of temporal lobe epilepsy, poorly controlled. Per outpatient neuro epilepsy may be contributing to patients subjective cognitive difficulties  - c/w Keppra 1000 mg BID   - c/w lacosamide 100 mg BID  - Brain MRI, not for hallucinations but recommended by outpatient neuro for epilepsy f/u, can also be done outpatient   74y (1950) woman with a PMHx significant for epilepsy, COPD, asthma, DM2, HTN, aortic dissection, aortic aneurysm, severe AS s/p TAVR, DVT, AFib, and colon cancer s/p resection with colostomy creation, admitted for worsening SOB, neuro consulted for pt complaining of recent hallucinations. Pt had a single episode of VH in the form of a tall figure or "possible family member" in the corner of her room as she was falling asleep, also reports multiple episodes over the past year of feeling the presence of her   and sister, which is consistent with pt's spiritual beliefs and are not distressing. PE was unremarkable for any significant neurological deficits. Pt had no reported visual or auditory hallucinations at time of interview. Pt's recent episode is likely consistent with a hypnagogic hallucination, no further neuro workup or treatment for hallucinations required at this time. Per pt's outpatient neurologist, her subjective cognitive difficulties is likely consistent with her poorly controlled temporal epilepsy.    #visual hallucinations  Pt reports single episode of seeing person in her room before bedtime, likely c/w hypnagogic hallucination. Also endorses occasionally feeling presence of her  /sister    #epilepsy  Hx of temporal lobe epilepsy, poorly controlled. Per outpatient neuro epilepsy may be contributing to patients subjective cognitive difficulties  - c/w Keppra 1000 mg BID   - c/w lacosamide 100 mg BID  - agree with Brain MRI, which was planned by outpatient neuro for epilepsy f/u, can also be done outpatient

## 2025-04-18 NOTE — DIETITIAN INITIAL EVALUATION ADULT - NS FNS DIET ORDER
Diet, Consistent Carbohydrate/No Snacks:   DASH/TLC {Sodium & Cholesterol Restricted} (DASH)  Supplement Feeding Modality:  Oral  Glucerna Shake Cans or Servings Per Day:  3       Frequency:  Three Times a day (04-18-25 @ 11:44)

## 2025-04-18 NOTE — DIETITIAN INITIAL EVALUATION ADULT - REASON INDICATOR FOR ASSESSMENT
RD consult warranted for Assessment, Education  Source: Patient, Patient's Daughter at Bedside, Electronic Medical Record  Chart reviewed, events noted.

## 2025-04-19 DIAGNOSIS — R94.31 ABNORMAL ELECTROCARDIOGRAM [ECG] [EKG]: ICD-10-CM

## 2025-04-19 LAB
ALBUMIN SERPL ELPH-MCNC: 4 G/DL — SIGNIFICANT CHANGE UP (ref 3.3–5)
ALP SERPL-CCNC: 100 U/L — SIGNIFICANT CHANGE UP (ref 40–120)
ALT FLD-CCNC: 44 U/L — SIGNIFICANT CHANGE UP (ref 10–45)
ANION GAP SERPL CALC-SCNC: 16 MMOL/L — SIGNIFICANT CHANGE UP (ref 5–17)
ANISOCYTOSIS BLD QL: SLIGHT — SIGNIFICANT CHANGE UP
AST SERPL-CCNC: 33 U/L — SIGNIFICANT CHANGE UP (ref 10–40)
BASOPHILS # BLD AUTO: 0 K/UL — SIGNIFICANT CHANGE UP (ref 0–0.2)
BASOPHILS NFR BLD AUTO: 0 % — SIGNIFICANT CHANGE UP (ref 0–2)
BILIRUB SERPL-MCNC: 0.3 MG/DL — SIGNIFICANT CHANGE UP (ref 0.2–1.2)
BUN SERPL-MCNC: 21 MG/DL — SIGNIFICANT CHANGE UP (ref 7–23)
CALCIUM SERPL-MCNC: 9.8 MG/DL — SIGNIFICANT CHANGE UP (ref 8.4–10.5)
CHLORIDE SERPL-SCNC: 105 MMOL/L — SIGNIFICANT CHANGE UP (ref 96–108)
CK MB CFR SERPL CALC: 2.6 NG/ML — SIGNIFICANT CHANGE UP (ref 0–3.8)
CK MB CFR SERPL CALC: 3 NG/ML — SIGNIFICANT CHANGE UP (ref 0–3.8)
CO2 SERPL-SCNC: 23 MMOL/L — SIGNIFICANT CHANGE UP (ref 22–31)
CREAT SERPL-MCNC: 0.78 MG/DL — SIGNIFICANT CHANGE UP (ref 0.5–1.3)
DACRYOCYTES BLD QL SMEAR: SLIGHT — SIGNIFICANT CHANGE UP
EGFR: 79 ML/MIN/1.73M2 — SIGNIFICANT CHANGE UP
EGFR: 79 ML/MIN/1.73M2 — SIGNIFICANT CHANGE UP
ELLIPTOCYTES BLD QL SMEAR: SIGNIFICANT CHANGE UP
EOSINOPHIL # BLD AUTO: 0 K/UL — SIGNIFICANT CHANGE UP (ref 0–0.5)
EOSINOPHIL NFR BLD AUTO: 0 % — SIGNIFICANT CHANGE UP (ref 0–6)
GAS PNL BLDV: SIGNIFICANT CHANGE UP
GLUCOSE BLDC GLUCOMTR-MCNC: 107 MG/DL — HIGH (ref 70–99)
GLUCOSE BLDC GLUCOMTR-MCNC: 112 MG/DL — HIGH (ref 70–99)
GLUCOSE BLDC GLUCOMTR-MCNC: 279 MG/DL — HIGH (ref 70–99)
GLUCOSE BLDC GLUCOMTR-MCNC: 294 MG/DL — HIGH (ref 70–99)
GLUCOSE BLDC GLUCOMTR-MCNC: 97 MG/DL — SIGNIFICANT CHANGE UP (ref 70–99)
GLUCOSE SERPL-MCNC: 58 MG/DL — LOW (ref 70–99)
HCT VFR BLD CALC: 39.5 % — SIGNIFICANT CHANGE UP (ref 34.5–45)
HGB BLD-MCNC: 12.3 G/DL — SIGNIFICANT CHANGE UP (ref 11.5–15.5)
LACTATE SERPL-SCNC: 2.4 MMOL/L — HIGH (ref 0.5–2)
LYMPHOCYTES # BLD AUTO: 1.73 K/UL — SIGNIFICANT CHANGE UP (ref 1–3.3)
LYMPHOCYTES # BLD AUTO: 13.9 % — SIGNIFICANT CHANGE UP (ref 13–44)
MAGNESIUM SERPL-MCNC: 2.6 MG/DL — SIGNIFICANT CHANGE UP (ref 1.6–2.6)
MANUAL SMEAR VERIFICATION: SIGNIFICANT CHANGE UP
MCHC RBC-ENTMCNC: 23.9 PG — LOW (ref 27–34)
MCHC RBC-ENTMCNC: 31.1 G/DL — LOW (ref 32–36)
MCV RBC AUTO: 76.8 FL — LOW (ref 80–100)
MONOCYTES # BLD AUTO: 0.86 K/UL — SIGNIFICANT CHANGE UP (ref 0–0.9)
MONOCYTES NFR BLD AUTO: 6.9 % — SIGNIFICANT CHANGE UP (ref 2–14)
MRSA PCR RESULT.: DETECTED
MYELOCYTES NFR BLD: 0.9 % — HIGH (ref 0–0)
NEUTROPHILS # BLD AUTO: 9.65 K/UL — HIGH (ref 1.8–7.4)
NEUTROPHILS NFR BLD AUTO: 77.4 % — HIGH (ref 43–77)
NRBC # BLD: 2 /100 WBCS — HIGH (ref 0–0)
NRBC BLD-RTO: 2 /100 WBCS — HIGH (ref 0–0)
PHOSPHATE SERPL-MCNC: 1.8 MG/DL — LOW (ref 2.5–4.5)
PLAT MORPH BLD: NORMAL — SIGNIFICANT CHANGE UP
PLATELET # BLD AUTO: 335 K/UL — SIGNIFICANT CHANGE UP (ref 150–400)
POIKILOCYTOSIS BLD QL AUTO: SIGNIFICANT CHANGE UP
POLYCHROMASIA BLD QL SMEAR: SLIGHT — SIGNIFICANT CHANGE UP
POTASSIUM SERPL-MCNC: 4.2 MMOL/L — SIGNIFICANT CHANGE UP (ref 3.5–5.3)
POTASSIUM SERPL-SCNC: 4.2 MMOL/L — SIGNIFICANT CHANGE UP (ref 3.5–5.3)
PROT SERPL-MCNC: 7.2 G/DL — SIGNIFICANT CHANGE UP (ref 6–8.3)
RBC # BLD: 5.14 M/UL — SIGNIFICANT CHANGE UP (ref 3.8–5.2)
RBC # FLD: 16.9 % — HIGH (ref 10.3–14.5)
RBC BLD AUTO: ABNORMAL
S AUREUS DNA NOSE QL NAA+PROBE: DETECTED
SCHISTOCYTES BLD QL AUTO: SLIGHT — SIGNIFICANT CHANGE UP
SODIUM SERPL-SCNC: 144 MMOL/L — SIGNIFICANT CHANGE UP (ref 135–145)
TARGETS BLD QL SMEAR: SLIGHT — SIGNIFICANT CHANGE UP
TROPONIN T, HIGH SENSITIVITY RESULT: 23 NG/L — SIGNIFICANT CHANGE UP (ref 0–51)
TROPONIN T, HIGH SENSITIVITY RESULT: 33 NG/L — SIGNIFICANT CHANGE UP (ref 0–51)
VARIANT LYMPHS # BLD: 0.9 % — SIGNIFICANT CHANGE UP (ref 0–6)
VARIANT LYMPHS NFR BLD MANUAL: 0.9 % — SIGNIFICANT CHANGE UP (ref 0–6)
WBC # BLD: 12.47 K/UL — HIGH (ref 3.8–10.5)
WBC # FLD AUTO: 12.47 K/UL — HIGH (ref 3.8–10.5)

## 2025-04-19 PROCEDURE — 93010 ELECTROCARDIOGRAM REPORT: CPT

## 2025-04-19 PROCEDURE — 99233 SBSQ HOSP IP/OBS HIGH 50: CPT

## 2025-04-19 PROCEDURE — 99232 SBSQ HOSP IP/OBS MODERATE 35: CPT

## 2025-04-19 PROCEDURE — 93010 ELECTROCARDIOGRAM REPORT: CPT | Mod: 77

## 2025-04-19 RX ORDER — SODIUM PHOSPHATE,DIBASIC DIHYD
30 POWDER (GRAM) MISCELLANEOUS ONCE
Refills: 0 | Status: COMPLETED | OUTPATIENT
Start: 2025-04-19 | End: 2025-04-19

## 2025-04-19 RX ORDER — OLANZAPINE 10 MG/1
2.5 TABLET ORAL ONCE
Refills: 0 | Status: COMPLETED | OUTPATIENT
Start: 2025-04-19 | End: 2025-04-19

## 2025-04-19 RX ORDER — FLUTICASONE PROPIONATE 50 UG/1
1 SPRAY, METERED NASAL
Refills: 0 | Status: DISCONTINUED | OUTPATIENT
Start: 2025-04-19 | End: 2025-04-24

## 2025-04-19 RX ORDER — OLANZAPINE 10 MG/1
2.5 TABLET ORAL ONCE
Refills: 0 | Status: DISCONTINUED | OUTPATIENT
Start: 2025-04-19 | End: 2025-04-19

## 2025-04-19 RX ORDER — SODIUM CHLORIDE 9 G/1000ML
1000 INJECTION, SOLUTION INTRAVENOUS
Refills: 0 | Status: COMPLETED | OUTPATIENT
Start: 2025-04-19 | End: 2025-04-19

## 2025-04-19 RX ORDER — QUETIAPINE FUMARATE 25 MG/1
12.5 TABLET ORAL AT BEDTIME
Refills: 0 | Status: DISCONTINUED | OUTPATIENT
Start: 2025-04-19 | End: 2025-04-19

## 2025-04-19 RX ADMIN — FLUTICASONE PROPIONATE 1 SPRAY(S): 50 SPRAY, METERED NASAL at 18:45

## 2025-04-19 RX ADMIN — Medication 85 MILLIMOLE(S): at 11:14

## 2025-04-19 RX ADMIN — BUDESONIDE 1 MILLIGRAM(S): 0.25 SUSPENSION RESPIRATORY (INHALATION) at 06:08

## 2025-04-19 RX ADMIN — MEXILETINE HYDROCHLORIDE 200 MILLIGRAM(S): 250 CAPSULE ORAL at 13:53

## 2025-04-19 RX ADMIN — LACOSAMIDE 100 MILLIGRAM(S): 150 TABLET, FILM COATED ORAL at 06:07

## 2025-04-19 RX ADMIN — SODIUM CHLORIDE 75 MILLILITER(S): 9 INJECTION, SOLUTION INTRAVENOUS at 14:05

## 2025-04-19 RX ADMIN — Medication 40 MILLIGRAM(S): at 06:09

## 2025-04-19 RX ADMIN — LEVETIRACETAM 1000 MILLIGRAM(S): 10 INJECTION, SOLUTION INTRAVENOUS at 17:50

## 2025-04-19 RX ADMIN — SODIUM CHLORIDE 75 MILLILITER(S): 9 INJECTION, SOLUTION INTRAVENOUS at 22:47

## 2025-04-19 RX ADMIN — INSULIN LISPRO 6: 100 INJECTION, SOLUTION INTRAVENOUS; SUBCUTANEOUS at 13:36

## 2025-04-19 RX ADMIN — LABETALOL HYDROCHLORIDE 100 MILLIGRAM(S): 200 TABLET, FILM COATED ORAL at 06:07

## 2025-04-19 RX ADMIN — CLOPIDOGREL BISULFATE 75 MILLIGRAM(S): 75 TABLET, FILM COATED ORAL at 11:21

## 2025-04-19 RX ADMIN — Medication 1 DOSE(S): at 06:08

## 2025-04-19 RX ADMIN — IPRATROPIUM BROMIDE AND ALBUTEROL SULFATE 3 MILLILITER(S): .5; 2.5 SOLUTION RESPIRATORY (INHALATION) at 06:08

## 2025-04-19 RX ADMIN — Medication 30 MILLILITER(S): at 14:03

## 2025-04-19 RX ADMIN — LABETALOL HYDROCHLORIDE 100 MILLIGRAM(S): 200 TABLET, FILM COATED ORAL at 21:11

## 2025-04-19 RX ADMIN — Medication 20 MILLIGRAM(S): at 11:20

## 2025-04-19 RX ADMIN — APIXABAN 5 MILLIGRAM(S): 2.5 TABLET, FILM COATED ORAL at 17:50

## 2025-04-19 RX ADMIN — OLANZAPINE 2.5 MILLIGRAM(S): 10 TABLET ORAL at 06:07

## 2025-04-19 RX ADMIN — ROSUVASTATIN CALCIUM 5 MILLIGRAM(S): 20 TABLET, FILM COATED ORAL at 21:10

## 2025-04-19 RX ADMIN — MONTELUKAST SODIUM 10 MILLIGRAM(S): 10 TABLET ORAL at 21:10

## 2025-04-19 RX ADMIN — GLYCOPYRROLATE 1 MILLIGRAM(S): 0.2 INJECTION INTRAMUSCULAR; INTRAVENOUS at 13:54

## 2025-04-19 RX ADMIN — Medication 500 MILLIGRAM(S): at 11:21

## 2025-04-19 RX ADMIN — TIOTROPIUM BROMIDE INHALATION SPRAY 2 PUFF(S): 3.12 SPRAY, METERED RESPIRATORY (INHALATION) at 11:27

## 2025-04-19 RX ADMIN — Medication 60 MILLIGRAM(S): at 21:09

## 2025-04-19 RX ADMIN — APIXABAN 5 MILLIGRAM(S): 2.5 TABLET, FILM COATED ORAL at 06:07

## 2025-04-19 RX ADMIN — LEVETIRACETAM 1000 MILLIGRAM(S): 10 INJECTION, SOLUTION INTRAVENOUS at 06:07

## 2025-04-19 RX ADMIN — INSULIN GLARGINE-YFGN 22 UNIT(S): 100 INJECTION, SOLUTION SUBCUTANEOUS at 09:00

## 2025-04-19 RX ADMIN — IPRATROPIUM BROMIDE AND ALBUTEROL SULFATE 3 MILLILITER(S): .5; 2.5 SOLUTION RESPIRATORY (INHALATION) at 11:53

## 2025-04-19 RX ADMIN — LACOSAMIDE 100 MILLIGRAM(S): 150 TABLET, FILM COATED ORAL at 17:50

## 2025-04-19 RX ADMIN — Medication 325 MILLIGRAM(S): at 11:20

## 2025-04-19 RX ADMIN — BUDESONIDE 1 MILLIGRAM(S): 0.25 SUSPENSION RESPIRATORY (INHALATION) at 18:46

## 2025-04-19 RX ADMIN — IPRATROPIUM BROMIDE AND ALBUTEROL SULFATE 3 MILLILITER(S): .5; 2.5 SOLUTION RESPIRATORY (INHALATION) at 18:46

## 2025-04-19 RX ADMIN — Medication 2 TABLET(S): at 21:09

## 2025-04-19 RX ADMIN — POLYETHYLENE GLYCOL 3350 17 GRAM(S): 17 POWDER, FOR SOLUTION ORAL at 11:23

## 2025-04-19 RX ADMIN — Medication 1 TABLET(S): at 11:20

## 2025-04-19 RX ADMIN — LABETALOL HYDROCHLORIDE 100 MILLIGRAM(S): 200 TABLET, FILM COATED ORAL at 13:53

## 2025-04-19 RX ADMIN — GLYCOPYRROLATE 1 MILLIGRAM(S): 0.2 INJECTION INTRAMUSCULAR; INTRAVENOUS at 21:11

## 2025-04-19 RX ADMIN — Medication 4 MILLILITER(S): at 06:08

## 2025-04-19 RX ADMIN — MEXILETINE HYDROCHLORIDE 200 MILLIGRAM(S): 250 CAPSULE ORAL at 21:10

## 2025-04-19 RX ADMIN — Medication 1 DOSE(S): at 18:45

## 2025-04-19 RX ADMIN — INSULIN GLARGINE-YFGN 28 UNIT(S): 100 INJECTION, SOLUTION SUBCUTANEOUS at 22:44

## 2025-04-19 RX ADMIN — INSULIN LISPRO 6: 100 INJECTION, SOLUTION INTRAVENOUS; SUBCUTANEOUS at 17:48

## 2025-04-19 RX ADMIN — GLYCOPYRROLATE 1 MILLIGRAM(S): 0.2 INJECTION INTRAMUSCULAR; INTRAVENOUS at 06:08

## 2025-04-19 RX ADMIN — IPRATROPIUM BROMIDE AND ALBUTEROL SULFATE 3 MILLILITER(S): .5; 2.5 SOLUTION RESPIRATORY (INHALATION) at 00:14

## 2025-04-19 RX ADMIN — PREDNISONE 40 MILLIGRAM(S): 20 TABLET ORAL at 06:07

## 2025-04-19 RX ADMIN — Medication 4 MILLILITER(S): at 18:46

## 2025-04-19 RX ADMIN — MEXILETINE HYDROCHLORIDE 200 MILLIGRAM(S): 250 CAPSULE ORAL at 06:07

## 2025-04-19 NOTE — PROGRESS NOTE ADULT - PROBLEM SELECTOR PLAN 7
Plan:   - c/w eliquis 5 mg BID - home meds: eliquis 5mg BID     Plan:   - c/w eliquis 5 mg BID  - c/w labetalol 100 mg TID  - c/w mexiletine 200 mg TID - home meds: eliquis 5mg BID     Plan:   - c/w eliquis 5 mg BID  - c/w labetalol 100 mg TID with hold parameter   - c/w mexiletine 200 mg TID

## 2025-04-19 NOTE — PROGRESS NOTE ADULT - PROBLEM SELECTOR PLAN 1
- pt reports she had generalized body shaking when she was diagnosed with seizure   - visual hallucination "seeing sister and  when they are not there" is a new occurrence that started last Friday, denies auditory hallucination   - pt is AOX3 (at baseline mental status) with no other mood/psychotic symptoms and hallucination is different from prior seizure symptoms, lower concern for psychiatric or seizure etiology for this new onset visual hallucination     Plan:   - send infectious workup to r/o infectious etiology of hallucination   - f/u BCx, UCx, procalcitonin   - pending MRI brain w/wo   - CTM - pt reports she had generalized body shaking when she was diagnosed with seizure   - visual hallucination "seeing sister and  when they are not there" is a new occurrence that started last Friday, denies auditory hallucination   - pt is AOX3 (at baseline mental status) with no other mood/psychotic symptoms and hallucination is different from prior seizure symptoms, lower concern for psychiatric or seizure etiology for this new onset visual hallucination     Plan:   - send infectious workup to r/o infectious etiology of hallucination   - f/u BCx, UCx, procalcitonin - negative so far mri  - MRI brain w/wo - There are multiple chronic microhemorrhages in both cerebral hemispheres with additional chronic microhemorrhages seen in the rocky and cerebellum. Mild microvascular-type changes in the periventricular and deep white matter. S/p endoscopic nasal surgery. Near complete opacification the right maxillary sinus. Dilated superior ophthalmic veins are slightly increased in size from before.  - Neuro c/s, appreciate recs - seem c/w hypnagogic hallucination, can be treated with 12.5 mg quetiapine qhs only if pt is reacting to hallucination/paranoid   - psych c/s, appreciate recs - diff include complicated grief rxn, temporal lobe seizure, medication induced hallucination

## 2025-04-19 NOTE — OCCUPATIONAL THERAPY INITIAL EVALUATION ADULT - PERTINENT HX OF CURRENT PROBLEM, REHAB EVAL
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 Antonio, Type A dissection s/p bioAVR with Bental procedure (9/2022), severe AS s/p TAVR (9/10/2023), TIA's, DVT, Afib on Eliquis,  Colon cancer S/P resection, colostomy bag, neuropathy, Midline for IV ABX placed 2 weeks ago for chronic "TB like" pneumonia on ertapenmen (today is supposed to be the last dose at 6pm) presenting today for worsening shortness of breath over the last 7 days. Per daughter, pt does not use inhaler Q4 as instructed and when it happens, pt develops SOB even with just 5 meters of walking. Pt states that she has fall 3-4 times in the last month, with last episode this morning 5AM when she was walking to the bathroom. She felt like her legs were giving up, denies any dizziness, headstrike, injury to any body parts, LOC, CP, palpitation. She also endorse intermittent new onset visual hallucination (seeing  and sister when they are not there) starting Friday 4/11. She reports having chronic cough due to asthma but has also been having burning on urination. Denies fever, recent illness, abd pain, n/v/d. Hospital stay: 4/17 CXR: trace right effusion. 4/17 focused US: no pericardial effusion. 4/17: CT chest: Mild bilateral interlobular septal thickening. Trace left effusion. In a setting of cardiomegaly, this may represent pulmonary venous hypertensive congestive changes. Scattered bilateral sub-6 mm pulmonary nodules may be reactive in nature. Recommend follow-up chest CT in 8-12 weeks to evaluate for

## 2025-04-19 NOTE — PROGRESS NOTE ADULT - PROBLEM SELECTOR PLAN 10
- Hx of dissection of descending thoracic aorta, , hx of aortic aneurysm,  Type B dissection (7/2024), medically managed initially as she did not meet criteria for surgery at that time), Type A dissection s/p bioAVR with Bental procedure (9/2022)    Plan:   - c/w labetalol 100 mg TID   - strict BP control Plan:   - c/w labetalol 100 TID   - c/w nifedipine 30 mg qd

## 2025-04-19 NOTE — PROGRESS NOTE ADULT - PROBLEM SELECTOR PLAN 9
Plan:   - c/w labetalol 100 TID   - c/w nifedipine 30 mg qd - Pt takes lantus 22 in AM, 28 at night    Plan:   - check A1C   - place pt back at home dose given likely will have hyperglycemia from steroid - lantus 22 in AM and 28 at night   - FS premeal and qhs   - nutrition c/s - Pt takes lantus 22 in AM, 28 at night    Plan:   - check A1C - 8   - place pt back at home dose given likely will have hyperglycemia from steroid - lantus 22 in AM and 28 at night   - FS premeal and qhs   - nutrition c/s

## 2025-04-19 NOTE — PROGRESS NOTE ADULT - PROBLEM SELECTOR PLAN 11
- severe AS s/p TAVR (9/10/2023), Type A dissection s/p bioAVR with Bental procedure (9/2022)    Plan:   - CTM   - c/w plavix - Hx of dissection of descending thoracic aorta, , hx of aortic aneurysm,  Type B dissection (7/2024), medically managed initially as she did not meet criteria for surgery at that time), Type A dissection s/p bioAVR with Bental procedure (9/2022)    Plan:   - c/w labetalol 100 mg TID   - strict BP control

## 2025-04-19 NOTE — OCCUPATIONAL THERAPY INITIAL EVALUATION ADULT - LEVEL OF INDEPENDENCE, REHAB EVAL
Called Dr Wise's office in regards to switching [facility] home monitor readings transferred to our clinic due to wanting to transfer care to [facility].  Transfer request submitted on 1/20/25.  Spoke to office today and they will get back to me on getting readings switched.   supervision

## 2025-04-19 NOTE — PROGRESS NOTE ADULT - ASSESSMENT
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 Antonio, Type A dissection s/p bioAVR with Bental procedure (9/2022), severe AS s/p TAVR (9/10/2023), TIA's, DVT, Afib on Eliquis,  Colon cancer S/P resection, colostomy bag, neuropathy, Midline for IV ABX placed 2 weeks ago for chronic "TB like" pneumonia on ertapenmen (today is supposed to be the last dose at 6pm) presenting today for worsening shortness of breath over the last 7 days. multiple falls, urinary burning, visual hallucination, admitted for further management of asthma/COPD exacerbation, and further eval of new onset visual hallucination.  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 Antonio, Type A dissection s/p bioAVR with Bental procedure (9/2022), severe AS s/p TAVR (9/10/2023), TIA's, DVT, Afib on Eliquis,  Colon cancer S/P resection, colostomy bag, neuropathy, Midline for IV ABX placed 2 weeks ago for chronic "TB like" pneumonia on ertapenmen (today is supposed to be the last dose at 6pm) presenting today for worsening shortness of breath over the last 7 days. multiple falls, urinary burning, visual hallucination, admitted for further management of asthma/COPD exacerbation, and further eval of new onset visual hallucination. 4/17 Noted new T wave inversion most prominently in V2.

## 2025-04-19 NOTE — PROGRESS NOTE ADULT - PROBLEM SELECTOR PLAN 2
- tachpechnic to RR of 30s, proBNP 3648 (baseline 1896 in Feb 2025)  -  CXR showed trace R pleural effusion.   - Received solumedrol 40 mg IV, duoneb x 3 in ED    Plan:   - c/w duoneb Q6 standing, Advair BID, Spiriva qd   - c/w budesonide 1mg Q12, montelukast 10 mg qd   - c/w methylprednisolone 60 IV qd   - pt uses CPAP and VESE at home (advised pt's daughter to bring in from home)   - chest PT, hypertonic saline   -  - tachpechnic to RR of 30s, proBNP 3648 (baseline 1896 in Feb 2025)  -  CXR showed trace R pleural effusion.   - Received solumedrol 40 mg IV, duoneb x 3 in ED    Plan:   - c/w duoneb Q6 standing, Advair BID, Spiriva qd   - c/w budesonide 1mg Q12, montelukast 10 mg qd   - Pulm c/s, appreciate recs   - switched from methylprednisolone 60 IV qd to prednisone 40 mg qd per pulm   - full RVP - neg   - pt uses CPAP and VESE at home (pt's daughter brought them in from home)   - chest PT, hypertonic saline   -   - PT/OT OOB to chair as much as possible   - advised pt's daughter to bring in Performist BID and Ohtuvayre BID from home as pharmacy don't carry it here

## 2025-04-19 NOTE — PROGRESS NOTE ADULT - PROBLEM SELECTOR PLAN 6
- home meds: eliquis 5mg BID     Plan:   - c/w eliquis 5 mg BID  - c/w labetalol 100 mg TID  - c/w mexiletine 200 mg TID - pt reports having generalized body shaking when she had seizure in the past   - takes keppra 1000 mg BID, lacosamide 100 mg BID at home     Plan:   - c/w Keppra 1000 mg BID   - c/w lacosamide 100 mg BID  - seizure precaution   - aspiration precaution

## 2025-04-19 NOTE — PROGRESS NOTE ADULT - PROBLEM SELECTOR PLAN 5
- pt reports having generalized body shaking when she had seizure in the past   - takes keppra 1000 mg BID, lacosamide 100 mg BID at home     Plan:   - c/w Keppra 1000 mg BID   - c/w lacosamide 100 mg BID  - seizure precaution   - aspiration precaution - pt presented with multiple falls in the past month     Plan:   - f/u infectious work up as mentioned above   - PT consult   - nutrition consult  - check orthostatic VS - pt presented with multiple falls in the past month     Plan:   - f/u infectious work up as mentioned above   - PT/OT/OOB to chair   - nutrition consult  - check orthostatic VS - negative

## 2025-04-19 NOTE — OCCUPATIONAL THERAPY INITIAL EVALUATION ADULT - ADDITIONAL COMMENTS
Pt lives in a private house with daughter with 1 1/2 inch step to enter. Pt has a HHA 12 hours a day 7 days a week, daughter and family would supplement the other hours. Pt owns rollator, shower chair and a step bar into the tub. Pt was able to ambulate short distance in the house but required assistance with ADLs

## 2025-04-19 NOTE — PROGRESS NOTE ADULT - PROBLEM SELECTOR PLAN 8
- Pt takes lantus 22 in AM, 28 at night    Plan:   - check A1C   - place pt back at home dose given likely will have hyperglycemia from steroid - lantus 22 in AM and 28 at night   - FS premeal and qhs   - nutrition c/s Plan:   - c/w eliquis 5 mg BID

## 2025-04-19 NOTE — PROGRESS NOTE ADULT - ASSESSMENT
74y (1950) woman with a PMHx significant for epilepsy, COPD, asthma, DM2, HTN, aortic dissection, aortic aneurysm, severe AS s/p TAVR, DVT, AFib, and colon cancer s/p resection with colostomy creation, admitted for worsening SOB, neuro consulted for pt complaining of recent hallucinations. Pt had a single episode of VH in the form of a tall figure or "possible family member" in the corner of her room as she was falling asleep, also reports multiple episodes over the past year of feeling the presence of her   and sister, which is consistent with pt's spiritual beliefs and are not distressing. PE was unremarkable for any significant neurological deficits. Pt had no reported visual or auditory hallucinations at time of interview.     Pt's recent episode seemed consistent with a hypnagogic hallucination, though she now seems to have more per primary team. MRI was unremarkable, and in my judgment there is no further neuro workup needed at this time.     If the hallucinations present a problem, could be treated with 12.5 mg quetiapine qhs, but would only do if she is reacting to them or paranoid about them. Per her outpatient neurologist, her subjective cognitive difficulties is likely consistent with her poorly controlled temporal epilepsy and the exam was not consistent with lewy body disease     For her epilepsy, she should continue Keppra 1000 mg BID and lacosamide 100 mg BID. We agree with brain MRI, which was planned by outpatient neuro for epilepsy f/u.   In my judgement, no need for additional inpatient workup or treatment at this time. She should follow-up with Dr Horner as outpatient ([phone])

## 2025-04-19 NOTE — PROGRESS NOTE ADULT - SUBJECTIVE AND OBJECTIVE BOX
*******************************  Hien Chavez, PGY-1  Internal Medicine  Contact via [employer] TEAMS    *******************************    PROGRESS NOTE:     Patient is a 74y old  Female who presents with a chief complaint of visual hallucination, multiple falls, SOB (18 Apr 2025 15:10)      INTERVAL EVENTS: No acute overnight events.     SUBJECTIVE: Patient seen and examined at bedside. This morning, the patient is comfortable and doing well. No acute complaints. Denies fevers, chills, N/V/D, chest pain, SOB, abdominal pain.    MEDICATIONS  (STANDING):  albuterol/ipratropium for Nebulization 3 milliLiter(s) Nebulizer every 6 hours  apixaban 5 milliGRAM(s) Oral every 12 hours  ascorbic acid 500 milliGRAM(s) Oral daily  buDESOnide    Inhalation Suspension 1 milliGRAM(s) Inhalation every 12 hours  clopidogrel Tablet 75 milliGRAM(s) Oral daily  dextrose 5%. 1000 milliLiter(s) (50 mL/Hr) IV Continuous <Continuous>  dextrose 5%. 1000 milliLiter(s) (100 mL/Hr) IV Continuous <Continuous>  dextrose 50% Injectable 25 Gram(s) IV Push once  dextrose 50% Injectable 12.5 Gram(s) IV Push once  dextrose 50% Injectable 25 Gram(s) IV Push once  ferrous    sulfate 325 milliGRAM(s) Oral daily  fluticasone propionate 50 MICROgram(s)/spray Nasal Spray 1 Spray(s) Both Nostrils two times a day  fluticasone propionate/ salmeterol 250-50 MICROgram(s) Diskus 1 Dose(s) Inhalation two times a day  glucagon  Injectable 1 milliGRAM(s) IntraMuscular once  glycopyrrolate 1 milliGRAM(s) Oral three times a day  insulin glargine Injectable (LANTUS) 22 Unit(s) SubCutaneous every morning  insulin glargine Injectable (LANTUS) 28 Unit(s) SubCutaneous at bedtime  insulin lispro (ADMELOG) corrective regimen sliding scale   SubCutaneous three times a day before meals  insulin lispro (ADMELOG) corrective regimen sliding scale   SubCutaneous at bedtime  labetalol 100 milliGRAM(s) Oral every 8 hours  lacosamide 100 milliGRAM(s) Oral two times a day  levETIRAcetam 1000 milliGRAM(s) Oral two times a day  megestrol 20 milliGRAM(s) Oral daily  mexiletine 200 milliGRAM(s) Oral three times a day  mineral oil 30 milliLiter(s) Oral daily  montelukast 10 milliGRAM(s) Oral at bedtime  multivitamin 1 Tablet(s) Oral daily  NIFEdipine XL 60 milliGRAM(s) Oral daily  pantoprazole    Tablet 40 milliGRAM(s) Oral before breakfast  polyethylene glycol 3350 17 Gram(s) Oral daily  predniSONE   Tablet 40 milliGRAM(s) Oral daily  rosuvastatin 5 milliGRAM(s) Oral at bedtime  senna 2 Tablet(s) Oral at bedtime  sodium chloride 7% Inhalation 4 milliLiter(s) Inhalation every 12 hours  tiotropium 2.5 MICROgram(s) Inhaler 2 Puff(s) Inhalation daily    MEDICATIONS  (PRN):  dextrose Oral Gel 15 Gram(s) Oral once PRN Blood Glucose LESS THAN 70 milliGRAM(s)/deciliter  magnesium citrate Oral Solution 296 milliLiter(s) Oral every 24 hours PRN Constipation  sertraline 50 milliGRAM(s) Oral daily PRN for anxiety      CAPILLARY BLOOD GLUCOSE      POCT Blood Glucose.: 97 mg/dL (19 Apr 2025 03:40)  POCT Blood Glucose.: 146 mg/dL (18 Apr 2025 21:55)  POCT Blood Glucose.: 228 mg/dL (18 Apr 2025 17:33)  POCT Blood Glucose.: 197 mg/dL (18 Apr 2025 14:10)  POCT Blood Glucose.: 219 mg/dL (18 Apr 2025 13:55)  POCT Blood Glucose.: 321 mg/dL (18 Apr 2025 12:43)  POCT Blood Glucose.: 327 mg/dL (18 Apr 2025 11:01)  POCT Blood Glucose.: 206 mg/dL (18 Apr 2025 08:34)    I&O's Summary    18 Apr 2025 07:01  -  19 Apr 2025 07:00  --------------------------------------------------------  IN: 240 mL / OUT: 100 mL / NET: 140 mL        PHYSICAL EXAM:  Vital Signs Last 24 Hrs  T(C): 36.8 (18 Apr 2025 22:06), Max: 36.9 (18 Apr 2025 12:00)  T(F): 98.2 (18 Apr 2025 22:06), Max: 98.4 (18 Apr 2025 12:00)  HR: 70 (19 Apr 2025 00:00) (54 - 74)  BP: 136/73 (18 Apr 2025 22:06) (136/73 - 153/75)  BP(mean): --  RR: 18 (18 Apr 2025 22:06) (18 - 18)  SpO2: 97% (19 Apr 2025 00:00) (92% - 97%)    Parameters below as of 18 Apr 2025 22:06  Patient On (Oxygen Delivery Method): room air        GENERAL: NAD, lying in bed comfortably  HEAD: Atraumatic, normocephalic  EYES: EOMI, PERRLA, conjunctiva and sclera clear  ENT: Moist mucous membranes  NECK: Supple, no JVD  HEART: S1, S2, Regular rate and rhythm, no murmurs, rubs, or gallops  LUNGS: Unlabored respirations, clear to auscultation bilaterally, no crackles, wheezing, or rhonchi  ABDOMEN: Soft, nontender, nondistended, +BS  EXTREMITIES: 2+ peripheral pulses bilaterally. No clubbing, cyanosis, or edema  NERVOUS SYSTEM:  A&Ox3, no focal deficits   SKIN: No rashes or lesions    LABS:                        10.9   10.55 )-----------( 284      ( 18 Apr 2025 10:27 )             35.2     04-18    138  |  104  |  16  ----------------------------<  250[H]  4.8   |  23  |  0.58    Ca    9.1      18 Apr 2025 10:27  Phos  1.9     04-18  Mg     2.2     04-18    TPro  6.4  /  Alb  3.6  /  TBili  0.2  /  DBili  x   /  AST  19  /  ALT  31  /  AlkPhos  90  04-18          Urinalysis Basic - ( 18 Apr 2025 10:27 )    Color: x / Appearance: x / SG: x / pH: x  Gluc: 250 mg/dL / Ketone: x  / Bili: x / Urobili: x   Blood: x / Protein: x / Nitrite: x   Leuk Esterase: x / RBC: x / WBC x   Sq Epi: x / Non Sq Epi: x / Bacteria: x        Culture - Sputum (collected 18 Apr 2025 11:25)  Source: Sputum Sputum  Gram Stain (18 Apr 2025 20:25):    Moderate polymorphonuclear leukocytes per low power field    Few Squamous epithelial cells per low power field    Rare Yeast like cells per oil power field    Culture - Blood (collected 17 Apr 2025 22:55)  Source: Blood Blood  Preliminary Report (19 Apr 2025 03:01):    No growth at 24 hours    Culture - Blood (collected 17 Apr 2025 22:45)  Source: Blood Blood  Preliminary Report (19 Apr 2025 03:01):    No growth at 24 hours    Culture - Urine (collected 17 Apr 2025 19:50)  Source: Urine  Final Report (18 Apr 2025 23:46):    <10,000 CFU/mL Normal Urogenital Jessica        RADIOLOGY & ADDITIONAL TESTS:  Results Reviewed:   Imaging Personally Reviewed:  Electrocardiogram Personally Reviewed:  Tele: *******************************  Hien Chavez, PGY-1  Internal Medicine  Contact via [employer] TEAMS    *******************************    PROGRESS NOTE:     Patient is a 74y old  Female who presents with a chief complaint of visual hallucination, multiple falls, SOB (18 Apr 2025 15:10)      INTERVAL EVENTS: Pt was agitated and hallucinating overnight, trying to leave hospital, failed verbal re-direction, Zyprexa was given x1.     SUBJECTIVE: Patient seen and examined at bedside. This morning, the patient is AOX2 and hallucinating, thinking "someone is working in the kitchen", no acute complaints. Denies fevers, chills, N/V/D, chest pain, SOB, abdominal pain.    MEDICATIONS  (STANDING):  albuterol/ipratropium for Nebulization 3 milliLiter(s) Nebulizer every 6 hours  apixaban 5 milliGRAM(s) Oral every 12 hours  ascorbic acid 500 milliGRAM(s) Oral daily  buDESOnide    Inhalation Suspension 1 milliGRAM(s) Inhalation every 12 hours  clopidogrel Tablet 75 milliGRAM(s) Oral daily  dextrose 5%. 1000 milliLiter(s) (50 mL/Hr) IV Continuous <Continuous>  dextrose 5%. 1000 milliLiter(s) (100 mL/Hr) IV Continuous <Continuous>  dextrose 50% Injectable 25 Gram(s) IV Push once  dextrose 50% Injectable 12.5 Gram(s) IV Push once  dextrose 50% Injectable 25 Gram(s) IV Push once  ferrous    sulfate 325 milliGRAM(s) Oral daily  fluticasone propionate 50 MICROgram(s)/spray Nasal Spray 1 Spray(s) Both Nostrils two times a day  fluticasone propionate/ salmeterol 250-50 MICROgram(s) Diskus 1 Dose(s) Inhalation two times a day  glucagon  Injectable 1 milliGRAM(s) IntraMuscular once  glycopyrrolate 1 milliGRAM(s) Oral three times a day  insulin glargine Injectable (LANTUS) 22 Unit(s) SubCutaneous every morning  insulin glargine Injectable (LANTUS) 28 Unit(s) SubCutaneous at bedtime  insulin lispro (ADMELOG) corrective regimen sliding scale   SubCutaneous three times a day before meals  insulin lispro (ADMELOG) corrective regimen sliding scale   SubCutaneous at bedtime  labetalol 100 milliGRAM(s) Oral every 8 hours  lacosamide 100 milliGRAM(s) Oral two times a day  levETIRAcetam 1000 milliGRAM(s) Oral two times a day  megestrol 20 milliGRAM(s) Oral daily  mexiletine 200 milliGRAM(s) Oral three times a day  mineral oil 30 milliLiter(s) Oral daily  montelukast 10 milliGRAM(s) Oral at bedtime  multivitamin 1 Tablet(s) Oral daily  NIFEdipine XL 60 milliGRAM(s) Oral daily  pantoprazole    Tablet 40 milliGRAM(s) Oral before breakfast  polyethylene glycol 3350 17 Gram(s) Oral daily  predniSONE   Tablet 40 milliGRAM(s) Oral daily  rosuvastatin 5 milliGRAM(s) Oral at bedtime  senna 2 Tablet(s) Oral at bedtime  sodium chloride 7% Inhalation 4 milliLiter(s) Inhalation every 12 hours  tiotropium 2.5 MICROgram(s) Inhaler 2 Puff(s) Inhalation daily    MEDICATIONS  (PRN):  dextrose Oral Gel 15 Gram(s) Oral once PRN Blood Glucose LESS THAN 70 milliGRAM(s)/deciliter  magnesium citrate Oral Solution 296 milliLiter(s) Oral every 24 hours PRN Constipation  sertraline 50 milliGRAM(s) Oral daily PRN for anxiety      CAPILLARY BLOOD GLUCOSE      POCT Blood Glucose.: 97 mg/dL (19 Apr 2025 03:40)  POCT Blood Glucose.: 146 mg/dL (18 Apr 2025 21:55)  POCT Blood Glucose.: 228 mg/dL (18 Apr 2025 17:33)  POCT Blood Glucose.: 197 mg/dL (18 Apr 2025 14:10)  POCT Blood Glucose.: 219 mg/dL (18 Apr 2025 13:55)  POCT Blood Glucose.: 321 mg/dL (18 Apr 2025 12:43)  POCT Blood Glucose.: 327 mg/dL (18 Apr 2025 11:01)  POCT Blood Glucose.: 206 mg/dL (18 Apr 2025 08:34)    I&O's Summary    18 Apr 2025 07:01  -  19 Apr 2025 07:00  --------------------------------------------------------  IN: 240 mL / OUT: 100 mL / NET: 140 mL        PHYSICAL EXAM:  Vital Signs Last 24 Hrs  T(C): 36.8 (18 Apr 2025 22:06), Max: 36.9 (18 Apr 2025 12:00)  T(F): 98.2 (18 Apr 2025 22:06), Max: 98.4 (18 Apr 2025 12:00)  HR: 70 (19 Apr 2025 00:00) (54 - 74)  BP: 136/73 (18 Apr 2025 22:06) (136/73 - 153/75)  BP(mean): --  RR: 18 (18 Apr 2025 22:06) (18 - 18)  SpO2: 97% (19 Apr 2025 00:00) (92% - 97%)    Parameters below as of 18 Apr 2025 22:06  Patient On (Oxygen Delivery Method): room air        GENERAL: NAD, lying in bed comfortably  HEAD: Atraumatic, normocephalic  EYES: + L prosthetic eye   HEART: S1, S2, bradycardic   LUNGS: wheezing in b/l lung fields   ABDOMEN: Soft, nontender, nondistended  EXTREMITIES: +TTP in b/l LE w/o edema   NERVOUS SYSTEM:  A&Ox3    LABS:                        10.9   10.55 )-----------( 284      ( 18 Apr 2025 10:27 )             35.2     04-18    138  |  104  |  16  ----------------------------<  250[H]  4.8   |  23  |  0.58    Ca    9.1      18 Apr 2025 10:27  Phos  1.9     04-18  Mg     2.2     04-18    TPro  6.4  /  Alb  3.6  /  TBili  0.2  /  DBili  x   /  AST  19  /  ALT  31  /  AlkPhos  90  04-18          Urinalysis Basic - ( 18 Apr 2025 10:27 )    Color: x / Appearance: x / SG: x / pH: x  Gluc: 250 mg/dL / Ketone: x  / Bili: x / Urobili: x   Blood: x / Protein: x / Nitrite: x   Leuk Esterase: x / RBC: x / WBC x   Sq Epi: x / Non Sq Epi: x / Bacteria: x        Culture - Sputum (collected 18 Apr 2025 11:25)  Source: Sputum Sputum  Gram Stain (18 Apr 2025 20:25):    Moderate polymorphonuclear leukocytes per low power field    Few Squamous epithelial cells per low power field    Rare Yeast like cells per oil power field    Culture - Blood (collected 17 Apr 2025 22:55)  Source: Blood Blood  Preliminary Report (19 Apr 2025 03:01):    No growth at 24 hours    Culture - Blood (collected 17 Apr 2025 22:45)  Source: Blood Blood  Preliminary Report (19 Apr 2025 03:01):    No growth at 24 hours    Culture - Urine (collected 17 Apr 2025 19:50)  Source: Urine  Final Report (18 Apr 2025 23:46):    <10,000 CFU/mL Normal Urogenital Jessica        RADIOLOGY & ADDITIONAL TESTS:  Results Reviewed:   Imaging Personally Reviewed:  Electrocardiogram Personally Reviewed:  Tele:

## 2025-04-19 NOTE — ED CLERICAL - DIVISION
"[facility] Medicine  Progress Note    Patient Name: Jojo Ayoub  MRN: [id number]  Patient Class: IP- Inpatient   Admission Date: 4/14/2025  Length of Stay: 4 days  Attending Physician: Jakub Rodarte MD  Primary Care Provider: Bahman Vincent Jr., MD        Subjective     Principal Problem:Sepsis        HPI:  Per Jo Danielle, PA-C:    "Ms. Jojo Ayoub is a 85 y.o. female, with PMH of T2DM, HTN, HLD, chronic constipation, OA w/ chronic left knee pain, anemia, who [place] [facility] due to b/l LE swelling x "a few weeks." Her daughter syayes the leg swelling was first noted by her today. She additionally notes low blood pressure readings of 90/55 at home today. She endorses left knee pain, and states she feels like her left knee "dislocate" a few nights ago. She states she had a knee replacement of her left knee over 20 years ago. She denied fall/trauma, leg pain or redness or warmth. She denied dizziness, fatigue, fever, chills, sore throat, runny nose, congestion, shortness of breath, chest pain, palpitations, abdominal pain, nausea, vomiting, diarrhea, or urinary symptoms. She was evaluated in the ED with labs showing leukocytosis of 19K, with left shift and H&H of 9.9/28.0. A metabolic panel showed sodium of 121, potassium of 5.2, glucose was 94, with CO2 of 19, and anion gap of 7. A BNP was 181 without elevation of troponin. A UA showed nitrite positive UTI with 2+ leuk esterase, with 20 WBC, and many bacteria. A CXR showed bilateral hilar prominence, and mild bibasilar densities. An x-ray of the knee had a findings that might reporesent a Pellegrinin-Stieda lesion vs. An avulsive fracture and a suprapatellar effusion. She was treated in the ED with 1L NS and rocephin."    Overview/Hospital Course:  Patient is a 85 year-old woman history of hypertension, diabetes mellitus type 2, osteoarthritis of left knee status post remote left knee replacement who " [facility]... presents with worsening left knee pain with swelling.  Patient walks with walker at home at baseline.  She reports she recently slid to ground from a chair at home a few weeks but denies any more recent fall or any other trauma.     Exam notable for significant bilateral lower extremity edema more so on the left side.  Laxity of the left knee.  Not tender to touch. Lungs clear.  Imaging concerning for possible avulsion fracture.  Orthopedic surgery service consult for evaluation and recommended Adamaris brace and physical therapy.    Ultrasound of lower extremity negative for clot.  Echocardiogram does not show evidence of heart failure.  Hyponatremia on presentation which is improving without intervention.    Patient with persistent elevated white count and now fever overnight.  Blood cultures ordered.  On empiric broad-spectrum antibiotics.    Interval History: She has no real complaints for me.     Thoracentesis attempted  4/18/25 but unable to aspirate fluid due to thick nature ( gelatinous)  Awaiting results of CT and US        Review of Systems   Constitutional:  Positive for fever. Negative for chills.   Respiratory:  Positive for cough. Negative for shortness of breath.    Cardiovascular:  Negative for chest pain.   Gastrointestinal:  Negative for abdominal pain, nausea and vomiting.   Genitourinary:  Negative for dysuria and frequency.     Objective:     Vital Signs (Most Recent):  Temp: 98.2 °F (36.8 °C) (04/19/25 0733)  Pulse: 79 (04/19/25 0733)  Resp: 18 (04/19/25 0733)  BP: (!) 117/58 (04/19/25 0733)  SpO2: 96 % (04/19/25 0733) Vital Signs (24h Range):  Temp:  [98.1 °F (36.7 °C)-100.2 °F (37.9 °C)] 98.2 °F (36.8 °C)  Pulse:  [67-99] 79  Resp:  [17-24] 18  SpO2:  [92 %-97 %] 96 %  BP: ()/(58-80) 117/58     Weight: 61.2 kg (134 lb 14.7 oz)  Body mass index is 23.16 kg/m².    Intake/Output Summary (Last 24 hours) at 4/19/2025 0902  Last data filed at 4/19/2025 0617  Gross per 24 hour   Intake 1550.82  ml   Output 1551 ml   Net -0.18 ml         Physical Exam  Constitutional:       General: She is not in acute distress.     Appearance: She is ill-appearing.   HENT:      Head: Atraumatic.   Eyes:      Conjunctiva/sclera: Conjunctivae normal.   Cardiovascular:      Rate and Rhythm: Normal rate and regular rhythm.      Heart sounds: Normal heart sounds. No murmur heard.  Pulmonary:      Effort: Pulmonary effort is normal.      Breath sounds: Rhonchi present. No wheezing.      Comments: Breathing comfortably been lung sounds courses with more prominent rhonchi in the left lower base that does not clear with cough.  Abdominal:      General: Bowel sounds are normal. There is no distension.      Palpations: Abdomen is soft.      Tenderness: There is no abdominal tenderness.   Musculoskeletal:         General: No deformity. Normal range of motion.      Cervical back: Neck supple.      Comments: Left knee not tender, swelling around the left knee and bilateral lower extremities much improved.   Neurological:      Mental Status: She is alert and oriented to person, place, and time.               Significant Labs: All pertinent labs within the past 24 hours have been reviewed.    Significant Imaging: I have reviewed all pertinent imaging results/findings within the past 24 hours.      Assessment & Plan  Mediastinal mass  4/18: Seen on CT, along with L  pleural effusion and axillary mass. Pulm attempted tap but unable to obtain fluid, will consult with IR. The family is considering axillary biopsy, a full US of the axilla is pending as surgery unable to palpate mass.     Sepsis  Hemoptysis  Presented with elevated white blood cell count and now with fever.  Unclear source.  UA suggestive of possible UTI but without urinary symptoms.  Left knee not tender with good range of motion.  Left knee swelling improving.  Resolving URI symptoms however today reports onset scant hemoptysis and cough.  Inflammatory response not improving  with empiric antibiotic therapy.  Blood culture no growth to date thus far.  Possible noninfectious source of inflammatory response.  Most common cause of hemoptysis airway irritation from pneumonia or upper respiratory infection.  However in light of advanced age concern for possible malignancy.    Hyponatremia  Prior history of hyponatremia.  Cause unclear.  Serum sodium improved without intervention.  TSH wnl.  Coristol speaks against adrenal insuffiencey.  Hypervolemic initially on exam however swelling seems serum improved on its own.  Urine sodium appropriately dilute.  Monitor.  Suspect some degree of SIADH related to lung pathology.  Continue to monitor serum sodium level.  If trends down again reasonable to repeat urine studies.  Acute on Chronic pain of left knee  Brace and therapy per Orthopedic surgery.  Patient doing well with physical therapy.  Continue.  Swelling improving.  Type 2 diabetes mellitus, without long-term current use of insulin  Reasonably controlled with current regimen.  Will continue with current regimen and continue to monitor.  Hyperlipidemia  Continue statin therapy.  Essential hypertension  Normotensive to mildly hypotensive.  Hold blood pressure medications.  Monitor.  Lower extremity edema      Fever      Pleural effusion      Acute pain of left knee      VTE Risk Mitigation (From admission, onward)           Ordered     IP VTE HIGH RISK PATIENT  Once         04/17/25 1115     Place LIANNE hose  Until discontinued         04/17/25 1115     Place sequential compression device  Until discontinued         04/17/25 1115     Reason for No Pharmacological VTE Prophylaxis  Once        Question:  Reasons:  Answer:  Active Bleeding    04/17/25 1115                    Discharge Planning   FAY: 4/19/2025     Code Status: Full Code   Medical Readiness for Discharge Date:   Discharge Plan A: Rehab   Discharge Delays: None known at this time      Continue workup      Please place Justification  for DME        Jakub Rodarte MD  Department of Hospital Medicine   [facility] - Med Surg ([place])

## 2025-04-19 NOTE — PROGRESS NOTE ADULT - PROBLEM SELECTOR PLAN 12
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

## 2025-04-19 NOTE — OCCUPATIONAL THERAPY INITIAL EVALUATION ADULT - GENERAL OBSERVATIONS, REHAB EVAL
Left detailed message per permission on file. Relayed results to pt with MD recommendation.   Instructed to call with any questions or concerns.' Pt received semisupine + tele + IV

## 2025-04-19 NOTE — PROGRESS NOTE ADULT - ATTENDING COMMENTS
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 Antonio, Type A dissection s/p bioAVR with Bental procedure (9/2022), severe AS s/p TAVR (9/10/2023), TIA's, DVT, Afib on Eliquis,  Colon cancer S/P resection, colostomy bag, neuropathy, Midline for IV ABX placed 2 weeks ago for chronic "TB like" pneumonia on ertapenmen (today is supposed to be the last dose at 6pm) presenting today for worsening shortness of breath over the last 7 days.     Plan:   - ECG with new t wave inversion of contiguous leads, trops negative. F/u with ECHO and cards consulted. Prior ECHo reviewed EF of 75%   - Lactate elevated as well, give IVF, f/u on repeat lactate. Blcx ngtd, MRSA positive (not positive prior) can consider Vanc 1x if leukocytosis increased, febrile or lactate is more elevated   - Bradycardia noted, medicatiosn readjusted with parameters. Cards consulted, recs appreciated. C/w Tele monitoring      Rest of management as per resident.     Dr. Zhu

## 2025-04-19 NOTE — PROGRESS NOTE ADULT - PROBLEM SELECTOR PLAN 3
- per pt, she has been on Ertapenem for TB like PNA, last dose should be today per daughter     Plan:   - c/w Ertapenem qd for now   - check sputum Cx   - give benadryl 50 mg PO prior to Ertapenem  - ID c/s in AM   - pulm c/s in AM - 4/19 noted new T wave inversion most prominently in V2 (not seen on 4/18 EKG)     Plan:  - check troponin, CKMB - - 4/19 noted new T wave inversion most prominently in V2 (not seen on 4/18 EKG)   - repeat EKG still showed T2 inversion in V2,    - pt currently asymtomatic and VSS     Plan:  - check troponin - 33  - check CKMB - 3   - trend Troponin and CKMB again in 4 hours   - cards c/s, appreciate recs   - check TTE

## 2025-04-19 NOTE — PROGRESS NOTE ADULT - SUBJECTIVE AND OBJECTIVE BOX
Neurology - Consult Note    -  Spectra: [phone] ([facility]), [phone] ([facility])  -    HPI: Patient RAYRAY RODRIGUEZ is a 74y (1950) woman with a PMHx significant for epilepsy, COPD, asthma, DM2, bronchiectasis, tracheomalacia s/p tracheloplasty, HTN, aortic dissection, aortic aneurysm, severe AS s/p TAVR, DVT, AFib, and colon cancer s/p resection with colostomy creation, admitted for worsening SOB, neuro consulted for pt complaining of recent hallucinations. Pt reports that she was lying in her room two nights ago watching cartoons, when she saw a tall figure in the corner of the room, who asked her to put on a mask. Pt turned her head briefly and when she turned back, the person was no longer there. Pt states she knows this person was not real but she feels it was probably something spiritual rather than a hallucination    Per primary attending, she had more hallucinations yesterday at the bedside without insight. She does not recall that with me. She also reports some subjective cognitive difficulties over the past 2-3 years, feels that she occasionally can't find the right word to say when speaking. Pt still able to complete crossword puzzles and reads extensively in her free time.     Medications:  MEDICATIONS  (STANDING):  albuterol/ipratropium for Nebulization 3 milliLiter(s) Nebulizer every 6 hours  apixaban 5 milliGRAM(s) Oral every 12 hours  ascorbic acid 500 milliGRAM(s) Oral daily  buDESOnide    Inhalation Suspension 1 milliGRAM(s) Inhalation every 12 hours  clopidogrel Tablet 75 milliGRAM(s) Oral daily  dextrose 5%. 1000 milliLiter(s) (50 mL/Hr) IV Continuous <Continuous>  dextrose 5%. 1000 milliLiter(s) (100 mL/Hr) IV Continuous <Continuous>  dextrose 50% Injectable 25 Gram(s) IV Push once  dextrose 50% Injectable 12.5 Gram(s) IV Push once  dextrose 50% Injectable 25 Gram(s) IV Push once  ferrous    sulfate 325 milliGRAM(s) Oral daily  fluticasone propionate/ salmeterol 250-50 MICROgram(s) Diskus 1 Dose(s) Inhalation two times a day  glucagon  Injectable 1 milliGRAM(s) IntraMuscular once  glycopyrrolate 1 milliGRAM(s) Oral three times a day  insulin glargine Injectable (LANTUS) 22 Unit(s) SubCutaneous every morning  insulin glargine Injectable (LANTUS) 28 Unit(s) SubCutaneous at bedtime  insulin lispro (ADMELOG) corrective regimen sliding scale   SubCutaneous three times a day before meals  insulin lispro (ADMELOG) corrective regimen sliding scale   SubCutaneous at bedtime  labetalol 100 milliGRAM(s) Oral every 8 hours  lacosamide 100 milliGRAM(s) Oral two times a day  levETIRAcetam 1000 milliGRAM(s) Oral two times a day  megestrol 20 milliGRAM(s) Oral daily  mexiletine 200 milliGRAM(s) Oral three times a day  mineral oil 30 milliLiter(s) Oral daily  montelukast 10 milliGRAM(s) Oral at bedtime  multivitamin 1 Tablet(s) Oral daily  NIFEdipine XL 30 milliGRAM(s) Oral daily  pantoprazole    Tablet 40 milliGRAM(s) Oral before breakfast  polyethylene glycol 3350 17 Gram(s) Oral daily  rosuvastatin 5 milliGRAM(s) Oral at bedtime  senna 2 Tablet(s) Oral at bedtime  sodium chloride 7% Inhalation 4 milliLiter(s) Inhalation every 12 hours  tiotropium 2.5 MICROgram(s) Inhaler 2 Puff(s) Inhalation daily    MEDICATIONS  (PRN):  dextrose Oral Gel 15 Gram(s) Oral once PRN Blood Glucose LESS THAN 70 milliGRAM(s)/deciliter  magnesium citrate Oral Solution 296 milliLiter(s) Oral every 24 hours PRN Constipation  sertraline 50 milliGRAM(s) Oral daily PRN for anxiety    Vital Signs Last 24 Hrs  T(C): 36.8 (19 Apr 2025 06:15), Max: 36.9 (18 Apr 2025 12:00)  T(F): 98.2 (19 Apr 2025 06:15), Max: 98.4 (18 Apr 2025 12:00)  HR: 47 (19 Apr 2025 10:00) (47 - 74)  BP: 136/56 (19 Apr 2025 06:15) (136/56 - 142/58)  BP(mean): --  RR: 18 (19 Apr 2025 06:15) (18 - 18)  SpO2: 97% (19 Apr 2025 10:00) (92% - 97%)    Parameters below as of 19 Apr 2025 06:15  Patient On (Oxygen Delivery Method): room air      Physical Examination:   General - NAD  Neurologic Exam:  Mental status - Awake, Alert, Oriented to person, place, and time. Speech fluent, repetition and naming intact. Follows simple and complex commands. Attention/concentration, recent and remote memory (including registration and recall), and fund of knowledge intact  Cranial nerves - PERRLA, VFF, EOMI for R eye (pt has prosthetic L eye), face sensation (V1-V3) intact b/l, patch of abnormal sensation on right cheek c/w prior surgery in that area, facial strength intact without asymmetry b/l, hearing intact b/l, palate with symmetric elevation, trapezius 5/5 strength b/l, tongue midline on protrusion with full lateral movement    Motor - Normal tone and strength throughout. No pronator drift.  Sensation - Light touch/temperature OR pain/vibration intact throughout  Coordination - Finger to Nose intact b/l. Mild intention tremor in R UE.  Gait deferred today      < from: MR Head No Cont (04.18.25 @ 17:40) >  ACC: [id number] EXAM:  MR BRAIN   ORDERED BY:  YONI GLASGOW     PROCEDURE DATE:  04/18/2025          INTERPRETATION:  CLINICAL INFORMATION: Hallucinations    COMPARISON: 11/04/2023.    CONTRAST:  IV Contrast: NONE  Complications: .    TECHNIQUE: MRI brain was performed using the following sequences: Axial   DWI, axial SWI, axial T2 FLAIR, sagittal T1 FLAIR, axial T2, axial T1   FLAIR, coronal T2    FINDINGS:    VENTRICLES AND SULCI: Age appropriate involutional changes.  INTRA-AXIAL: There isno diffusion restriction to indicate an acute or   recent subacute infarct.  No acute hemorrhage, vasogenic edema, mass   effect, or midline shift.  There are punctate foci of susceptibility in   the left cerebellar hemisphere (7:22), rocky (7:23) right temporal lobe   (7:28), right frontoparietal convexity (7:54, 7:57, 7:61), left occipital   lobe (7:34) left temporal lobe (7:30),  left temporal parietal region   (7:51) left parietal lobe (7:57, 7:63), and left frontal lobe (7:64),   consistent with chronic microhemorrhages.  There are a few small patchy   T2 FLAIR signal hyperintensities in periventricular and deep white   matter, consistent with mild microvascular-type changes.  EXTRA-AXIAL: No blood products or fluid collection.    SINUSES:  The patient is status post prior endoscopic nasal surgery with   maxillary sinus antrectomies bilaterally, middle turbinectomies   bilaterally and right middle turbinectomy.  Near complete opacification   of the right maxillary sinus  MASTOIDS:  Clear.  ORBITS: Status post right lens replacement.  Left ocular prosthesis.    Dilated right superior ophthalmic vein measures approximately 4 mm   (11:12), compared to 3 mm on 11/04/2023.  Dilated left superior   ophthalmic vein measures approximately6 mm (11:12), compared to 4-5 mm   on 11/04/2023.  CALVARIUM: Intact.    MISCELLANEOUS: None.      IMPRESSION:  No MRI evidence of acute intracranial pathology.    There are multiple chronic microhemorrhages in both cerebral hemispheres   with additional chronic microhemorrhages seen in the rocky and cerebellum.    No evidence of an old lobe or hemorrhage.    Mild microvascular-type changes in the periventricular and deep white   matter.    Status post prior endoscopic nasal surgery.  Near complete opacification   the right maxillary sinus.    Dilated superior ophthalmic veins are slightly increased in size from   10/27/2023.        --- End of Report ---            SHANNON PEREA MD; Attending Radiologist  This document has been electronically signed. Apr 18 2025  6:15PM    < end of copied text >

## 2025-04-19 NOTE — PROGRESS NOTE ADULT - PROBLEM SELECTOR PLAN 4
- pt presented with multiple falls in the past month     Plan:   - f/u infectious work up as mentioned above   - PT consult   - nutrition consult  - check orthostatic VS - per pt, she has been on Ertapenem for TB like PNA, last dose should be today per daughter     Plan:   - c/w Ertapenem qd for now   - check sputum Cx   - give benadryl 50 mg PO prior to Ertapenem  - ID c/s in AM   - pulm c/s in AM - per pt, she has been on Ertapenem for TB like PNA, last dose should be today per daughter     Plan:   - ID c/s, appreciate recs - hold Ertapenem (can increase seizure threshold) and monitor off Abx   - check sputum Cx - moderate polymorphonuclear leukocytes, few squamous epithelial cells, rare yeast   - give benadryl 50 mg PO prior to Ertapenem  - Sputum Cx showed moderate polymorphonuclear leukocytes, few squamous epithelial cells, rare yeast   - pulm c/s, appreciate recs

## 2025-04-20 LAB
ALBUMIN SERPL ELPH-MCNC: 3.8 G/DL — SIGNIFICANT CHANGE UP (ref 3.3–5)
ALP SERPL-CCNC: 92 U/L — SIGNIFICANT CHANGE UP (ref 40–120)
ALT FLD-CCNC: 34 U/L — SIGNIFICANT CHANGE UP (ref 10–45)
ANION GAP SERPL CALC-SCNC: 15 MMOL/L — SIGNIFICANT CHANGE UP (ref 5–17)
AST SERPL-CCNC: 23 U/L — SIGNIFICANT CHANGE UP (ref 10–40)
BASOPHILS # BLD AUTO: 0.04 K/UL — SIGNIFICANT CHANGE UP (ref 0–0.2)
BASOPHILS NFR BLD AUTO: 0.4 % — SIGNIFICANT CHANGE UP (ref 0–2)
BILIRUB SERPL-MCNC: 0.3 MG/DL — SIGNIFICANT CHANGE UP (ref 0.2–1.2)
BUN SERPL-MCNC: 13 MG/DL — SIGNIFICANT CHANGE UP (ref 7–23)
CALCIUM SERPL-MCNC: 9.4 MG/DL — SIGNIFICANT CHANGE UP (ref 8.4–10.5)
CHLORIDE SERPL-SCNC: 108 MMOL/L — SIGNIFICANT CHANGE UP (ref 96–108)
CO2 SERPL-SCNC: 21 MMOL/L — LOW (ref 22–31)
CREAT SERPL-MCNC: 0.46 MG/DL — LOW (ref 0.5–1.3)
CULTURE RESULTS: ABNORMAL
EGFR: 99 ML/MIN/1.73M2 — SIGNIFICANT CHANGE UP
EGFR: 99 ML/MIN/1.73M2 — SIGNIFICANT CHANGE UP
EOSINOPHIL # BLD AUTO: 0.04 K/UL — SIGNIFICANT CHANGE UP (ref 0–0.5)
EOSINOPHIL NFR BLD AUTO: 0.4 % — SIGNIFICANT CHANGE UP (ref 0–6)
GAS PNL BLDV: SIGNIFICANT CHANGE UP
GLUCOSE BLDC GLUCOMTR-MCNC: 156 MG/DL — HIGH (ref 70–99)
GLUCOSE BLDC GLUCOMTR-MCNC: 197 MG/DL — HIGH (ref 70–99)
GLUCOSE BLDC GLUCOMTR-MCNC: 252 MG/DL — HIGH (ref 70–99)
GLUCOSE BLDC GLUCOMTR-MCNC: 50 MG/DL — CRITICAL LOW (ref 70–99)
GLUCOSE BLDC GLUCOMTR-MCNC: 54 MG/DL — CRITICAL LOW (ref 70–99)
GLUCOSE BLDC GLUCOMTR-MCNC: 57 MG/DL — LOW (ref 70–99)
GLUCOSE BLDC GLUCOMTR-MCNC: 74 MG/DL — SIGNIFICANT CHANGE UP (ref 70–99)
GLUCOSE BLDC GLUCOMTR-MCNC: 91 MG/DL — SIGNIFICANT CHANGE UP (ref 70–99)
GLUCOSE BLDC GLUCOMTR-MCNC: 92 MG/DL — SIGNIFICANT CHANGE UP (ref 70–99)
GLUCOSE BLDC GLUCOMTR-MCNC: 92 MG/DL — SIGNIFICANT CHANGE UP (ref 70–99)
GLUCOSE SERPL-MCNC: 44 MG/DL — CRITICAL LOW (ref 70–99)
HCT VFR BLD CALC: 39.8 % — SIGNIFICANT CHANGE UP (ref 34.5–45)
HGB BLD-MCNC: 12.1 G/DL — SIGNIFICANT CHANGE UP (ref 11.5–15.5)
IMM GRANULOCYTES NFR BLD AUTO: 0.4 % — SIGNIFICANT CHANGE UP (ref 0–0.9)
LYMPHOCYTES # BLD AUTO: 17.9 % — SIGNIFICANT CHANGE UP (ref 13–44)
LYMPHOCYTES # BLD AUTO: 2 K/UL — SIGNIFICANT CHANGE UP (ref 1–3.3)
MAGNESIUM SERPL-MCNC: 2.4 MG/DL — SIGNIFICANT CHANGE UP (ref 1.6–2.6)
MCHC RBC-ENTMCNC: 23.9 PG — LOW (ref 27–34)
MCHC RBC-ENTMCNC: 30.4 G/DL — LOW (ref 32–36)
MCV RBC AUTO: 78.7 FL — LOW (ref 80–100)
MONOCYTES # BLD AUTO: 1.69 K/UL — HIGH (ref 0–0.9)
MONOCYTES NFR BLD AUTO: 15.1 % — HIGH (ref 2–14)
NEUTROPHILS # BLD AUTO: 7.38 K/UL — SIGNIFICANT CHANGE UP (ref 1.8–7.4)
NEUTROPHILS NFR BLD AUTO: 65.8 % — SIGNIFICANT CHANGE UP (ref 43–77)
NRBC BLD AUTO-RTO: 0 /100 WBCS — SIGNIFICANT CHANGE UP (ref 0–0)
PHOSPHATE SERPL-MCNC: 2.6 MG/DL — SIGNIFICANT CHANGE UP (ref 2.5–4.5)
PLATELET # BLD AUTO: 321 K/UL — SIGNIFICANT CHANGE UP (ref 150–400)
POTASSIUM SERPL-MCNC: 3.7 MMOL/L — SIGNIFICANT CHANGE UP (ref 3.5–5.3)
POTASSIUM SERPL-SCNC: 3.7 MMOL/L — SIGNIFICANT CHANGE UP (ref 3.5–5.3)
PROT SERPL-MCNC: 7 G/DL — SIGNIFICANT CHANGE UP (ref 6–8.3)
RBC # BLD: 5.06 M/UL — SIGNIFICANT CHANGE UP (ref 3.8–5.2)
RBC # FLD: 17.3 % — HIGH (ref 10.3–14.5)
SODIUM SERPL-SCNC: 144 MMOL/L — SIGNIFICANT CHANGE UP (ref 135–145)
SPECIMEN SOURCE: SIGNIFICANT CHANGE UP
WBC # BLD: 11.19 K/UL — HIGH (ref 3.8–10.5)
WBC # FLD AUTO: 11.19 K/UL — HIGH (ref 3.8–10.5)

## 2025-04-20 PROCEDURE — 72125 CT NECK SPINE W/O DYE: CPT | Mod: 26

## 2025-04-20 PROCEDURE — 99232 SBSQ HOSP IP/OBS MODERATE 35: CPT | Mod: GC

## 2025-04-20 PROCEDURE — 70450 CT HEAD/BRAIN W/O DYE: CPT | Mod: 26

## 2025-04-20 PROCEDURE — 76377 3D RENDER W/INTRP POSTPROCES: CPT | Mod: 26

## 2025-04-20 PROCEDURE — 99233 SBSQ HOSP IP/OBS HIGH 50: CPT

## 2025-04-20 PROCEDURE — 73110 X-RAY EXAM OF WRIST: CPT | Mod: 26,50

## 2025-04-20 PROCEDURE — 72192 CT PELVIS W/O DYE: CPT | Mod: 26

## 2025-04-20 PROCEDURE — 93010 ELECTROCARDIOGRAM REPORT: CPT

## 2025-04-20 PROCEDURE — 76377 3D RENDER W/INTRP POSTPROCES: CPT | Mod: 26,77

## 2025-04-20 PROCEDURE — 70486 CT MAXILLOFACIAL W/O DYE: CPT | Mod: 26

## 2025-04-20 RX ORDER — DEXTROSE 50 % IN WATER 50 %
25 SYRINGE (ML) INTRAVENOUS ONCE
Refills: 0 | Status: COMPLETED | OUTPATIENT
Start: 2025-04-20 | End: 2025-04-20

## 2025-04-20 RX ORDER — FORMOTEROL FUMARATE DIHYDRATE 20 UG/2ML
20 SOLUTION RESPIRATORY (INHALATION)
Refills: 0 | Status: DISCONTINUED | OUTPATIENT
Start: 2025-04-20 | End: 2025-04-24

## 2025-04-20 RX ORDER — QUETIAPINE FUMARATE 25 MG/1
12.5 TABLET ORAL ONCE
Refills: 0 | Status: COMPLETED | OUTPATIENT
Start: 2025-04-20 | End: 2025-04-20

## 2025-04-20 RX ORDER — MUPIROCIN CALCIUM 20 MG/G
1 CREAM TOPICAL
Refills: 0 | Status: DISCONTINUED | OUTPATIENT
Start: 2025-04-20 | End: 2025-04-24

## 2025-04-20 RX ORDER — QUETIAPINE FUMARATE 25 MG/1
12.5 TABLET ORAL AT BEDTIME
Refills: 0 | Status: DISCONTINUED | OUTPATIENT
Start: 2025-04-20 | End: 2025-04-24

## 2025-04-20 RX ORDER — INSULIN GLARGINE-YFGN 100 [IU]/ML
14 INJECTION, SOLUTION SUBCUTANEOUS EVERY MORNING
Refills: 0 | Status: DISCONTINUED | OUTPATIENT
Start: 2025-04-20 | End: 2025-04-21

## 2025-04-20 RX ORDER — DEXTROSE 50 % IN WATER 50 %
15 SYRINGE (ML) INTRAVENOUS ONCE
Refills: 0 | Status: DISCONTINUED | OUTPATIENT
Start: 2025-04-20 | End: 2025-04-24

## 2025-04-20 RX ORDER — DEXTROSE 50 % IN WATER 50 %
25 SYRINGE (ML) INTRAVENOUS ONCE
Refills: 0 | Status: DISCONTINUED | OUTPATIENT
Start: 2025-04-20 | End: 2025-04-20

## 2025-04-20 RX ORDER — INSULIN GLARGINE-YFGN 100 [IU]/ML
14 INJECTION, SOLUTION SUBCUTANEOUS AT BEDTIME
Refills: 0 | Status: DISCONTINUED | OUTPATIENT
Start: 2025-04-20 | End: 2025-04-21

## 2025-04-20 RX ORDER — DEXTROSE 50 % IN WATER 50 %
50 SYRINGE (ML) INTRAVENOUS ONCE
Refills: 0 | Status: DISCONTINUED | OUTPATIENT
Start: 2025-04-20 | End: 2025-04-20

## 2025-04-20 RX ADMIN — Medication 25 MILLILITER(S): at 09:41

## 2025-04-20 RX ADMIN — LABETALOL HYDROCHLORIDE 100 MILLIGRAM(S): 200 TABLET, FILM COATED ORAL at 06:06

## 2025-04-20 RX ADMIN — Medication 60 MILLIGRAM(S): at 22:04

## 2025-04-20 RX ADMIN — Medication 500 MILLIGRAM(S): at 11:04

## 2025-04-20 RX ADMIN — Medication 1 DOSE(S): at 18:54

## 2025-04-20 RX ADMIN — GLYCOPYRROLATE 1 MILLIGRAM(S): 0.2 INJECTION INTRAMUSCULAR; INTRAVENOUS at 06:08

## 2025-04-20 RX ADMIN — Medication 4 MILLILITER(S): at 19:55

## 2025-04-20 RX ADMIN — Medication 30 MILLILITER(S): at 11:54

## 2025-04-20 RX ADMIN — LABETALOL HYDROCHLORIDE 100 MILLIGRAM(S): 200 TABLET, FILM COATED ORAL at 14:35

## 2025-04-20 RX ADMIN — BUDESONIDE 1 MILLIGRAM(S): 0.25 SUSPENSION RESPIRATORY (INHALATION) at 07:42

## 2025-04-20 RX ADMIN — PREDNISONE 40 MILLIGRAM(S): 20 TABLET ORAL at 06:06

## 2025-04-20 RX ADMIN — MUPIROCIN CALCIUM 1 APPLICATION(S): 20 CREAM TOPICAL at 18:55

## 2025-04-20 RX ADMIN — Medication 25 MILLILITER(S): at 07:36

## 2025-04-20 RX ADMIN — ROSUVASTATIN CALCIUM 5 MILLIGRAM(S): 20 TABLET, FILM COATED ORAL at 22:04

## 2025-04-20 RX ADMIN — IPRATROPIUM BROMIDE AND ALBUTEROL SULFATE 3 MILLILITER(S): .5; 2.5 SOLUTION RESPIRATORY (INHALATION) at 19:58

## 2025-04-20 RX ADMIN — LACOSAMIDE 100 MILLIGRAM(S): 150 TABLET, FILM COATED ORAL at 06:06

## 2025-04-20 RX ADMIN — IPRATROPIUM BROMIDE AND ALBUTEROL SULFATE 3 MILLILITER(S): .5; 2.5 SOLUTION RESPIRATORY (INHALATION) at 11:30

## 2025-04-20 RX ADMIN — Medication 20 MILLIGRAM(S): at 11:04

## 2025-04-20 RX ADMIN — Medication 40 MILLIGRAM(S): at 07:39

## 2025-04-20 RX ADMIN — QUETIAPINE FUMARATE 12.5 MILLIGRAM(S): 25 TABLET ORAL at 09:36

## 2025-04-20 RX ADMIN — Medication 4 MILLILITER(S): at 06:11

## 2025-04-20 RX ADMIN — LACOSAMIDE 100 MILLIGRAM(S): 150 TABLET, FILM COATED ORAL at 18:50

## 2025-04-20 RX ADMIN — APIXABAN 5 MILLIGRAM(S): 2.5 TABLET, FILM COATED ORAL at 06:08

## 2025-04-20 RX ADMIN — Medication 1 DOSE(S): at 06:11

## 2025-04-20 RX ADMIN — FLUTICASONE PROPIONATE 1 SPRAY(S): 50 SPRAY, METERED NASAL at 18:55

## 2025-04-20 RX ADMIN — INSULIN GLARGINE-YFGN 14 UNIT(S): 100 INJECTION, SOLUTION SUBCUTANEOUS at 22:22

## 2025-04-20 RX ADMIN — IPRATROPIUM BROMIDE AND ALBUTEROL SULFATE 3 MILLILITER(S): .5; 2.5 SOLUTION RESPIRATORY (INHALATION) at 23:59

## 2025-04-20 RX ADMIN — FORMOTEROL FUMARATE DIHYDRATE 20 MICROGRAM(S): 20 SOLUTION RESPIRATORY (INHALATION) at 18:56

## 2025-04-20 RX ADMIN — Medication 25 GRAM(S): at 02:19

## 2025-04-20 RX ADMIN — MEXILETINE HYDROCHLORIDE 200 MILLIGRAM(S): 250 CAPSULE ORAL at 22:04

## 2025-04-20 RX ADMIN — INSULIN LISPRO 6: 100 INJECTION, SOLUTION INTRAVENOUS; SUBCUTANEOUS at 14:30

## 2025-04-20 RX ADMIN — TIOTROPIUM BROMIDE INHALATION SPRAY 2 PUFF(S): 3.12 SPRAY, METERED RESPIRATORY (INHALATION) at 10:50

## 2025-04-20 RX ADMIN — Medication 1 APPLICATION(S): at 10:48

## 2025-04-20 RX ADMIN — QUETIAPINE FUMARATE 12.5 MILLIGRAM(S): 25 TABLET ORAL at 22:04

## 2025-04-20 RX ADMIN — LEVETIRACETAM 1000 MILLIGRAM(S): 10 INJECTION, SOLUTION INTRAVENOUS at 06:05

## 2025-04-20 RX ADMIN — BUDESONIDE 1 MILLIGRAM(S): 0.25 SUSPENSION RESPIRATORY (INHALATION) at 19:29

## 2025-04-20 RX ADMIN — LABETALOL HYDROCHLORIDE 100 MILLIGRAM(S): 200 TABLET, FILM COATED ORAL at 22:04

## 2025-04-20 RX ADMIN — POLYETHYLENE GLYCOL 3350 17 GRAM(S): 17 POWDER, FOR SOLUTION ORAL at 11:05

## 2025-04-20 RX ADMIN — GLYCOPYRROLATE 1 MILLIGRAM(S): 0.2 INJECTION INTRAMUSCULAR; INTRAVENOUS at 14:34

## 2025-04-20 RX ADMIN — IPRATROPIUM BROMIDE AND ALBUTEROL SULFATE 3 MILLILITER(S): .5; 2.5 SOLUTION RESPIRATORY (INHALATION) at 06:12

## 2025-04-20 RX ADMIN — CLOPIDOGREL BISULFATE 75 MILLIGRAM(S): 75 TABLET, FILM COATED ORAL at 11:04

## 2025-04-20 RX ADMIN — SERTRALINE 50 MILLIGRAM(S): 100 TABLET, FILM COATED ORAL at 11:03

## 2025-04-20 RX ADMIN — Medication 2 TABLET(S): at 22:05

## 2025-04-20 RX ADMIN — Medication 1 TABLET(S): at 11:05

## 2025-04-20 RX ADMIN — MONTELUKAST SODIUM 10 MILLIGRAM(S): 10 TABLET ORAL at 22:04

## 2025-04-20 RX ADMIN — MEXILETINE HYDROCHLORIDE 200 MILLIGRAM(S): 250 CAPSULE ORAL at 06:08

## 2025-04-20 RX ADMIN — LEVETIRACETAM 1000 MILLIGRAM(S): 10 INJECTION, SOLUTION INTRAVENOUS at 18:51

## 2025-04-20 RX ADMIN — FLUTICASONE PROPIONATE 1 SPRAY(S): 50 SPRAY, METERED NASAL at 06:11

## 2025-04-20 RX ADMIN — GLYCOPYRROLATE 1 MILLIGRAM(S): 0.2 INJECTION INTRAMUSCULAR; INTRAVENOUS at 22:04

## 2025-04-20 RX ADMIN — Medication 325 MILLIGRAM(S): at 11:04

## 2025-04-20 RX ADMIN — MEXILETINE HYDROCHLORIDE 200 MILLIGRAM(S): 250 CAPSULE ORAL at 14:34

## 2025-04-20 NOTE — PROGRESS NOTE ADULT - PROBLEM SELECTOR PLAN 4
- per pt, she has been on Ertapenem for TB like PNA, last dose should be today per daughter     Plan:   - ID c/s, appreciate recs - hold Ertapenem (can increase seizure threshold) and monitor off Abx   - check sputum Cx - moderate polymorphonuclear leukocytes, few squamous epithelial cells, rare yeast   - give benadryl 50 mg PO prior to Ertapenem  - Sputum Cx showed moderate polymorphonuclear leukocytes, few squamous epithelial cells, rare yeast   - pulm c/s, appreciate recs

## 2025-04-20 NOTE — BH CONSULTATION LIAISON PROGRESS NOTE - NSICDXBHPRIMARYDX_PSY_ALL_CORE
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

## 2025-04-20 NOTE — CONSULT NOTE ADULT - SUBJECTIVE AND OBJECTIVE BOX
Plastic Surgery Consult Note  (pg [facility]: [id number], [facility]: [phone])    HPI:  74yFemale with pmhx of Epilepsy on Keppra, COPD, asthma  (on CPAP and VATS at home, on chronic steroids), DM2, bronchiectasis, tracheomalacia s/p tracheloplasty, HTN, Hx of dissection of descending thoracic aorta, hx of aortic aneurysm, Type B dissection (7/2024), medically managed initially as she did not meet criteria for surgery at that time), evaluated by Dr. Antonio, Type A dissection s/p bioAVR with Bental procedure (9/2022), severe AS s/p TAVR (9/10/2023), TIA's, DVT, Afib on Eliquis,  Colon cancer S/P resection, colostomy bag, neuropathy, Midline for IV ABX placed 2 weeks ago for chronic "TB like" pneumonia on ertapenmen (today is supposed to be the last dose at 6pm) admitted for worsening shortness of breath over the last 7 days. multiple falls, urinary burning, visual hallucination, admitted for further management of asthma/COPD exacerbation, and further eval of new onset visual hallucination.     This morning patient experienced a visual hallucination and fell while ambulating in her room. CT Maxface demonstrating a left periorbital hematoma with a comminuted fracture of the left medial orbital wall and resultant hemorrhagic air-fluid levels in the left ethmoid sinus, extending into the left sphenoid sinus. There is herniation of the orbital fat and small parts of the left medial rectus and inferior rectus muscles that extends into the defect.    PAST MEDICAL & SURGICAL HISTORY:  Seizure  x 1 1/7/18      DVT (deep venous thrombosis)  15-20 years ago, took coumadin      TIA (transient ischemic attack)  multiple, last 5 years ago - presents as right-sided weakness      COPD (chronic obstructive pulmonary disease)      Atrial fibrillation      History of COPD      Afib      Aortic valve replaced      Epilepsy      Asthma      DM (diabetes mellitus)      History of seizure disorder      History of TIAs      HTN (hypertension)      Bronchiectasis      Colon cancer      History of partial hysterectomy  30 years ago - fibroids      H/O total knee replacement, bilateral  5 years ago      History of sinus surgery  multiple sinus surgeries      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      H/O pelvic surgery  5 years ago - s/p fracture      History of tracheomalacia  2015 - attempted tracheal stenting ([facility])- course complicated by obstruction, respiratory failure, multiple CPR attempts -  stent discontinued; 10/20/2016 Tracheobronchoplasty (Prolene Mesh) performed at [facility] by Dr Zapien      S/P bronchoscopy  6/5/2018 - [place] [facility] (Dr Zapien) no evidence of tracheobronchomalacia in trachea or bronchial tubes      Rectal bleeding  exam under anesthesia ([facility]) 2/2018      S/P TAVR (transcatheter aortic valve replacement)      S/P aortic valve replacement      S/P colostomy      S/P AVR (aortic valve replacement)        Allergies    aspirin (Unknown)  codeine (Unknown)  codeine (Short breath)  Valium (Unknown)  cefepime (Short breath (Severe))  shellfish (Anaphylaxis)  penicillin (Anaphylaxis)  cefepime (Anaphylaxis)  penicillin (Unknown)  Percocet (Unknown)  Avelox (Short breath; Pruritus)  Dilaudid (Short breath)  ampicillin (Unknown)  Valium (Short breath)  OxyContin (Unknown)  aspirin (Short breath)  iodine (Short breath; Swelling)  tetanus toxoid (Short breath)  Avelox (Unknown)  meropenem (Unknown)    Intolerances      Home Medications:  ascorbic acid 500 mg oral tablet: 1 tab(s) orally once a day (in the morning) (17 Apr 2025 18:50)  Breo Ellipta 200 mcg-25 mcg/inh inhalation powder: 1 puff(s) inhaled once a day (17 Apr 2025 18:42)  budesonide 1 mg/2 mL inhalation suspension: 2 milliliter(s) by nebulizer 2 times a day (17 Apr 2025 18:42)  clopidogrel 75 mg oral tablet: 1 tab(s) orally once a day (17 Apr 2025 18:49)  Cranberry oral tablet: 1 tab(s) orally once a day (in the morning) (17 Apr 2025 18:50)  DuoNeb 0.5 mg-2.5 mg/3 mL inhalation solution: 3 milliliter(s) by nebulizer 4 times a day (17 Apr 2025 18:42)  Eliquis 5 mg oral tablet: 1 tab(s) orally 2 times a day (17 Apr 2025 18:49)  famotidine 20 mg oral tablet: 1 tab(s) orally once a day (at bedtime) (18 Apr 2025 12:28)  ferrous sulfate: 325 milligram(s) orally once a day (17 Apr 2025 18:49)  Flonase 50 mcg/inh nasal spray: 1 spray(s) in each nostril once a day (17 Apr 2025 19:35)  glycopyrrolate 1 mg oral tablet: 1 tab(s) orally 3 times a day (17 Apr 2025 18:49)  HumaLOG 100 units/mL injectable solution: injectable Sliding Scale (17 Apr 2025 18:44)  Incruse Ellipta 62.5 mcg/inh inhalation powder: 1 inhaler(s) inhaled once a day (17 Apr 2025 18:42)  ipratropium 21 mcg/inh (0.03%) nasal spray: 2 spray(s) intranasally once a day (17 Apr 2025 19:35)  Keppra 500 mg oral tablet: 2 tab(s) orally 2 times a day (17 Apr 2025 18:49)  labetalol 100 mg oral tablet: 1 tab(s) orally every 8 hours (17 Apr 2025 18:49)  lacosamide 100 mg oral tablet: 1 tab(s) orally 2 times a day (17 Apr 2025 18:50)  Lantus Solostar Pen 100 units/mL subcutaneous solution: 22 unit(s) subcutaneous once a day (in the morning) (17 Apr 2025 18:42)  Lantus Solostar Pen 100 units/mL subcutaneous solution: 28 unit(s) subcutaneous once a day (at bedtime) (17 Apr 2025 18:42)  magnesium citrate: 1 dose(s) once a day (18 Apr 2025 12:35)  megestrol 20 mg oral tablet: 1 tab(s) orally once a day as needed for appetite (17 Apr 2025 18:49)  methylPREDNISolone 8 mg oral tablet: 1 tab(s) orally once a day (17 Apr 2025 18:48)  mexiletine 200 mg oral capsule: 1 cap(s) orally 3 times a day (17 Apr 2025 18:49)  mineral oil oral liquid: 30 milliliter(s) orally once a day (17 Apr 2025 18:50)  montelukast 10 mg oral tablet: 1 tab(s) orally once a day (at bedtime) (17 Apr 2025 18:49)  multivitamin: 1 tab(s) orally once a day (17 Apr 2025 18:46)  NIFEdipine 30 mg oral tablet, extended release: 1 tab(s) orally once a day (17 Apr 2025 18:49)  Ohtuvayre 3 mg/ 2.5mL inhalation suspension: 3 milligram(s) by nebulizer 2 times a day (17 Apr 2025 18:42)  pantoprazole 40 mg oral delayed release tablet: 1 tab(s) orally once a day (17 Apr 2025 18:49)  Perforomist 20 mcg/2 mL inhalation solution: 2 milliliter(s) inhaled 2 times a day (17 Apr 2025 18:42)  polyethylene glycol 3350 oral powder for reconstitution: 17 gram(s) orally once a day (17 Apr 2025 18:50)  rosuvastatin 5 mg oral tablet: 1 tab(s) orally once a day (at bedtime) (17 Apr 2025 18:49)  Senna 8.6 mg oral tablet: 2 tab(s) orally once a day (17 Apr 2025 18:50)  sertraline 50 mg oral tablet: 1 tab(s) orally once a day (in the morning) as needed for  anxiety (17 Apr 2025 18:49)  Sodium Chloride 7% Inhalation Solution: twice daily (17 Apr 2025 18:42)  [place] Pre-filled Pen 210 mg/1.91 mL subcutaneous solution: 210 milligram(s) subcutaneously once a month (17 Apr 2025 18:42)    MEDICATIONS  (STANDING):  albuterol/ipratropium for Nebulization 3 milliLiter(s) Nebulizer every 6 hours  ascorbic acid 500 milliGRAM(s) Oral daily  buDESOnide    Inhalation Suspension 1 milliGRAM(s) Inhalation every 12 hours  chlorhexidine 2% Cloths 1 Application(s) Topical daily  dextrose 5%. 1000 milliLiter(s) (50 mL/Hr) IV Continuous <Continuous>  dextrose 5%. 1000 milliLiter(s) (100 mL/Hr) IV Continuous <Continuous>  dextrose 50% Injectable 25 Gram(s) IV Push once  dextrose 50% Injectable 12.5 Gram(s) IV Push once  dextrose 50% Injectable 25 Gram(s) IV Push once  ferrous    sulfate 325 milliGRAM(s) Oral daily  fluticasone propionate 50 MICROgram(s)/spray Nasal Spray 1 Spray(s) Both Nostrils two times a day  fluticasone propionate/ salmeterol 250-50 MICROgram(s) Diskus 1 Dose(s) Inhalation two times a day  formoterol for nebulization 20 MICROGram(s) Nebulizer two times a day  glucagon  Injectable 1 milliGRAM(s) IntraMuscular once  glycopyrrolate 1 milliGRAM(s) Oral three times a day  insulin glargine Injectable (LANTUS) 14 Unit(s) SubCutaneous every morning  insulin glargine Injectable (LANTUS) 14 Unit(s) SubCutaneous at bedtime  insulin lispro (ADMELOG) corrective regimen sliding scale   SubCutaneous three times a day before meals  insulin lispro (ADMELOG) corrective regimen sliding scale   SubCutaneous at bedtime  labetalol 100 milliGRAM(s) Oral every 8 hours  lacosamide 100 milliGRAM(s) Oral two times a day  levETIRAcetam 1000 milliGRAM(s) Oral two times a day  megestrol 20 milliGRAM(s) Oral daily  mexiletine 200 milliGRAM(s) Oral three times a day  mineral oil 30 milliLiter(s) Oral daily  montelukast 10 milliGRAM(s) Oral at bedtime  multivitamin 1 Tablet(s) Oral daily  mupirocin 2% Nasal 1 Application(s) Both Nostrils two times a day  NIFEdipine XL 60 milliGRAM(s) Oral daily  Ohtuvayre [Ensifentrine] 3mG/2.5mL Nebul 3 milliGRAM(s)   Nebulizer two times a day  pantoprazole    Tablet 40 milliGRAM(s) Oral before breakfast  polyethylene glycol 3350 17 Gram(s) Oral daily  predniSONE   Tablet 40 milliGRAM(s) Oral daily  QUEtiapine 12.5 milliGRAM(s) Oral at bedtime  rosuvastatin 5 milliGRAM(s) Oral at bedtime  senna 2 Tablet(s) Oral at bedtime  sodium chloride 7% Inhalation 4 milliLiter(s) Inhalation every 12 hours  tiotropium 2.5 MICROgram(s) Inhaler 2 Puff(s) Inhalation daily      SOCIAL HISTORY:  FAMILY HISTORY:  Family history of asthma    Family history of breast cancer (Sibling)    Family history of diabetes mellitus type II        ___________________________________________  OBJECTIVE:  Vital Signs Last 24 Hrs  T(C): 36.9 (20 Apr 2025 12:13), Max: 36.9 (20 Apr 2025 12:13)  T(F): 98.4 (20 Apr 2025 12:13), Max: 98.4 (20 Apr 2025 12:13)  HR: 76 (20 Apr 2025 14:33) (51 - 76)  BP: 160/82 (20 Apr 2025 14:33) (119/57 - 174/89)  BP(mean): --  RR: 16 (20 Apr 2025 14:33) (16 - 18)  SpO2: 95% (20 Apr 2025 14:33) (94% - 98%)    Parameters below as of 20 Apr 2025 14:33  Patient On (Oxygen Delivery Method): room air    CAPILLARY BLOOD GLUCOSE      POCT Blood Glucose.: 252 mg/dL (20 Apr 2025 14:18)    I&O's Detail    19 Apr 2025 07:01  -  20 Apr 2025 07:00  --------------------------------------------------------  IN:  Total IN: 0 mL    OUT:    Voided (mL): 2300 mL  Total OUT: 2300 mL    Total NET: -2300 mL        >> VITAL SIGNS: Afebrile. Stable.  >> CONSTITUTIONAL: AOx3. NAD.   >> HEENT:        Gross examination of the head is remarkable for periorbital swelling and ecchymosis.        The upper third of the face demonstrating some periorbital ecchymosis. There is tenderness to palpation along the medial aspect of the nasal dorsum on the left side. There are no step-offs or crepitus. Ocular prosthesis in place on left side.        The middle third of the face demonstrates ecchymosis and swelling over left zygoma; tenderness to palpation over left zygoma. There are no step-offs or crepitus. Examination of the ears is unremarkable bilaterally: there is no evidence of otorrhea, hemotympanum, or Keating’s sign. Intranasal examination is unremarkable bilaterally: there is no evidence of acute or active bleeding or septal hematoma.        The lower third of the face without any abnormal exam findings. There are no step-offs, tenderness to palpation, or crepitus. Intraoral examination is unremarkable. TMJ's unremarkable bilaterally. The patient is able to bring teeth into occlusion. Maximal incisal opening 4-5cm.  >> NEUROLOGICAL: Unable to evaluate EOM of L eye due to ocular prosthesis. Facial motor intact and symmetric bilaterally. Facial sensory intact and symmetric bilaterally. CN 8-10 intact. Shoulder shrug equal and symmetric bilaterally. Tongue protrudes in midline. Motor and sensory are grossly intact in bilateral upper and lower extremities.    ____________________________________________  LABS:  CBC Full  -  ( 20 Apr 2025 07:23 )  WBC Count : 11.19 K/uL  RBC Count : 5.06 M/uL  Hemoglobin : 12.1 g/dL  Hematocrit : 39.8 %  Platelet Count - Automated : 321 K/uL  Mean Cell Volume : 78.7 fl  Mean Cell Hemoglobin : 23.9 pg  Mean Cell Hemoglobin Concentration : 30.4 g/dL  Auto Neutrophil # : x  Auto Lymphocyte # : x  Auto Monocyte # : x  Auto Eosinophil # : x  Auto Basophil # : x  Auto Neutrophil % : x  Auto Lymphocyte % : x  Auto Monocyte % : x  Auto Eosinophil % : x  Auto Basophil % : x    04-20    144  |  108  |  13  ----------------------------<  44[LL]  3.7   |  21[L]  |  0.46[L]    Ca    9.4      20 Apr 2025 07:21  Phos  2.6     04-20  Mg     2.4     04-20    TPro  7.0  /  Alb  3.8  /  TBili  0.3  /  DBili  x   /  AST  23  /  ALT  34  /  AlkPhos  92  04-20    LIVER FUNCTIONS - ( 20 Apr 2025 07:21 )  Alb: 3.8 g/dL / Pro: 7.0 g/dL / ALK PHOS: 92 U/L / ALT: 34 U/L / AST: 23 U/L / GGT: x             Urinalysis Basic - ( 20 Apr 2025 07:21 )    Color: x / Appearance: x / SG: x / pH: x  Gluc: 44 mg/dL / Ketone: x  / Bili: x / Urobili: x   Blood: x / Protein: x / Nitrite: x   Leuk Esterase: x / RBC: x / WBC x   Sq Epi: x / Non Sq Epi: x / Bacteria: x      CARDIAC MARKERS ( 19 Apr 2025 16:40 )  x     / x     / x     / x     / 2.6 ng/mL  CARDIAC MARKERS ( 19 Apr 2025 11:13 )  x     / x     / x     / x     / 3.0 ng/mL        ____________________________________________  MICRO:  RECENT CULTURES:  Specimen Source: Sputum Sputum  Date/Time: 04-18 @ 11:25  Culture Results:   Commensal val consistent with body site[!]  Gram Stain:   Moderate polymorphonuclear leukocytes per low power field  Few Squamous epithelial cells per low power field  Rare Yeast like cells per oil power field[!]  Organism: --  Specimen Source: Blood Blood  Date/Time: 04-17 @ 22:55  Culture Results:   No growth at 48 Hours  Gram Stain: --  Organism: --  Specimen Source: Blood Blood  Date/Time: 04-17 @ 22:45  Culture Results:   No growth at 48 Hours  Gram Stain: --  Organism: --  Specimen Source: Urine  Date/Time: 04-17 @ 19:50  Culture Results:   <10,000 CFU/mL Normal Urogenital Val  Gram Stain: --  Organism: --    ____________________________________________  RADIOLOGY:  < from: CT Maxillofacial No Cont (04.20.25 @ 10:08) >    ACC: [id number] EXAM:  CT CERVICAL SPINE   ORDERED BY:  YONI GLASGOW     ACC: [id number] EXAM:  CT MAXILLOFACIAL   ORDERED BY:  YONI GLASGOW     ACC: [id number] EXAM:  CT BRAIN   ORDERED BY:  YONI GLASGOW     ACC: [id number] EXAM:  CT 3D RECONSTRUCT MI DE LA CRUZ   ORDERED BY:  YONI GLASGOW     PROCEDURE DATE:  04/20/2025          INTERPRETATION:  CLINICAL INFORMATION: Trauma    TECHNIQUE:  1.  Axial CT images were acquired through the head.  2.  Axial CT images were acquired through the maxillofacial bones.  3.  Axial CT images were acquired through the cervical spine.  Intravenous contrast: None  Two-dimensional reformats were generated.  Three-D reformatted images were also obtained.  COMPARISON STUDY: None    FINDINGS:  CT HEAD:    There is no CT evidence of acute intracranial hemorrhage,  mass effect,   midline shift, or acute territorial infarct.    The ventricles and sulci   are normal in size and configuration. The basal cisterns are patent.   There are periventricular white matter hypodensities that are nonspecific   in nature but may reflect chronic ischemic microvascular disease.    The mastoid air cells and middle ear cavities are grossly clear.    There is no depressed calvarial fracture.      CT MAXILLOFACIAL BONES:    There is a left periorbital hematoma with a comminuted fracture of the   left medial orbital wall and resultant hemorrhagic air-fluid levels in   the left ethmoid sinus, extending into the left sphenoid sinus. There is   herniation of the orbital fat and small parts of the left medial rectus   and inferior rectus muscles that extends into the defect.    The mandible is intact.  The temporomandibular joints are not dislocated.    Status post sinus surgery with near complete opacification of the   maxillary sinuses.    The nasal septum is grossly intact.  There is no abnormal soft tissue   within the nasal cavity.    There is a left ocular prosthesis. There is no gross CT evidence of right   traumatic globe injury.  The right optic globe is smooth and symmetric in   contour. The retrobulbar and extraconal fat is well-preserved. The   extraocular muscles are not enlarged or deviated.      CT CERVICAL SPINE:    There is straightening of the cervical lordosis.  There is no evidence of an acute cervical spine fracture or traumatic   malalignment.  Sclerotic focus in the C3 vertebral body, likely represents a bone island.  The paraspinous soft tissues are unremarkable within limits of CT scan.    Degenerative changes:  There are multilevel degenerative changes characterized by disc   osteophyte complexes and facet and uncinate hypertrophy with resultant   mild to moderate multilevel central canal and neural foraminal stenosis.    Incidental findings:  Visualized soft tissues of the neck appear unremarkable.  Visualized upper chest appears unremarkable.      IMPRESSION:    CT HEAD: No acute intracranial hemorrhage, mass effect, or osseous   fracture.    CT MAXILLOFACIAL BONES: Left periorbital hematoma with a comminuted   fracture of the left medial orbital wall and resultant hemorrhagic   air-fluid levels in the left ethmoid sinus, extending into the left   sphenoid sinus. There is herniation of the orbital fat and small parts of   the left medial rectus and inferior rectus muscles that extends into the   defect. Correlate clinically for muscular entrapment.    CT CERVICAL SPINE: No acute cervical spine fracture or traumatic   malalignment.    --- End of Report ---            CORTNEY CAMILO MD; Attending Radiologist  This document has been electronically signed. Apr 20 2025 11:37AM    < end of copied text >

## 2025-04-20 NOTE — CHART NOTE - NSCHARTNOTEFT_GEN_A_CORE
Called to bedside because patient was found on the floor face down next to her bed.  Upon getting the patient back in bed she had a bloody nose with blood in her mouth.  She reports that she was trying to get away from the fish in her bed.  Bruised zygomatic arch however overall patient is alert and appears to be similar to how she was earlier this morning.    Physical Exam:  HEENT:  Bruised and swollen left zygomatic process.  Blood in mouth without evidence of trauma  MSK:  Patient reports left hip pain however, non tender to flexion and external rotation  NEURO/PSYCH:  Saying that fish are on the bed.  R eye PRL, left eye prosthesis    Assessment    It seems that patient rolled out of bed in response to hallucinations.  Fall precautions were ordered prior to the bed.  Bed alarm did not go off.    Plan  - Give 12.5mg quetiapine now  - CT Head  - CT C spine  - CT Max Face  - CT Left hip  - 1/2 amp D50 to be given prior to going down for CT scans (patient has been hypoglycemic this AM in spite of dextrose given several times)  - Patient is to get a new bed and a new room  - Consider 1:1    Will update daughter soon Called to bedside because patient was found on the floor face down next to her bed.  Upon getting the patient back in bed she had a bloody nose with blood in her mouth.  She reports that she was trying to get away from the fish in her bed.  Bruised zygomatic arch however overall patient is alert and appears to be similar to how she was earlier this morning.    Physical Exam:  HEENT:  Bruised and swollen left zygomatic process.  Blood in mouth without evidence of trauma  MSK:  Patient reports left hip pain however, non tender to flexion and external rotation  NEURO/PSYCH:  Saying that fish are on the bed.  R eye PRL, left eye prosthesis    Assessment    It seems that patient rolled out of bed in response to hallucinations.  Fall precautions were ordered prior to the bed.  Bed alarm did not go off.    Plan  - Give 12.5mg quetiapine now  - CT Head  - CT C spine  - CT Max Face  - CT Left hip  - 1/2 amp D50 to be given prior to going down for CT scans (patient has been hypoglycemic this AM in spite of dextrose given several times)  - Patient is to get a new bed and a new room  - Consider 1:1    Will update daughter soon    ADDENDUM:    Daughter updated

## 2025-04-20 NOTE — PROGRESS NOTE ADULT - PROBLEM SELECTOR PLAN 1
- pt reports she had generalized body shaking when she was diagnosed with seizure   - visual hallucination "seeing sister and  when they are not there" is a new occurrence that started last Friday, denies auditory hallucination   - pt is AOX3 (at baseline mental status) with no other mood/psychotic symptoms and hallucination is different from prior seizure symptoms, lower concern for psychiatric or seizure etiology for this new onset visual hallucination     Plan:   - send infectious workup to r/o infectious etiology of hallucination   - f/u BCx, UCx, procalcitonin - negative so far mri  - MRI brain w/wo - There are multiple chronic microhemorrhages in both cerebral hemispheres with additional chronic microhemorrhages seen in the rocky and cerebellum. Mild microvascular-type changes in the periventricular and deep white matter. S/p endoscopic nasal surgery. Near complete opacification the right maxillary sinus. Dilated superior ophthalmic veins are slightly increased in size from before.  - Neuro c/s, appreciate recs - seem c/w hypnagogic hallucination, can be treated with 12.5 mg quetiapine qhs only if pt is reacting to hallucination/paranoid   - psych c/s, appreciate recs - diff include complicated grief rxn, temporal lobe seizure, medication induced hallucination - pt reports she had generalized body shaking when she was diagnosed with seizure   - visual hallucination "seeing sister and  when they are not there" is a new occurrence that started last Friday, denies auditory hallucination   - pt is AOX3 (at baseline mental status) with no other mood/psychotic symptoms and hallucination is different from prior seizure symptoms, lower concern for psychiatric or seizure etiology for this new onset visual hallucination   - Patient is now s/p fall (4/20) while trying to get away from hallucinations    Plan:   - send infectious workup to r/o infectious etiology of hallucination   - f/u BCx, UCx, procalcitonin - negative so far mri  - MRI brain w/wo - There are multiple chronic microhemorrhages in both cerebral hemispheres with additional chronic microhemorrhages seen in the rocky and cerebellum. Mild microvascular-type changes in the periventricular and deep white matter. S/p endoscopic nasal surgery. Near complete opacification the right maxillary sinus. Dilated superior ophthalmic veins are slightly increased in size from before.  - Neuro c/s, appreciate recs - seem c/w hypnagogic hallucination, can be treated with 12.5 mg quetiapine qhs only if pt is reacting to hallucination/paranoid   - psych c/s, appreciate recs - diff include complicated grief rxn, temporal lobe seizure, medication induced hallucination  - Begin seroquel 12.5mg qHS  - Repeat EKG  - 1:1  - Will follow up with psychiatry

## 2025-04-20 NOTE — CONSULT NOTE ADULT - SUBJECTIVE AND OBJECTIVE BOX
[facility] DEPARTMENT OF OPHTHALMOLOGY - INITIAL ADULT CONSULT  -----------------------------------------------------------------------------------------------------------------  Snow Perez MD  PGY 2  Contact on Teams  -----------------------------------------------------------------------------------------------------------------  PT IS TO BE SEEN    HPI:  74yFemale with pmhx of Epilepsy on Keppra, COPD, asthma  (on CPAP and VATS at home, on chronic steroids), DM2, bronchiectasis, tracheomalacia s/p tracheloplasty, HTN, Hx of dissection of descending thoracic aorta, hx of aortic aneurysm, Type B dissection (7/2024), Type A dissection s/p bioAVR with Bental procedure (9/2022), severe AS s/p TAVR (9/10/2023), TIA's, DVT, Afib on Eliquis,  Colon cancer S/P resection, colostomy bag, neuropathy, Midline for IV ABX placed 2 weeks ago for chronic "TB like" pneumonia on ertapenmen (today is supposed to be the last dose at 6pm) who presented for worsening shortness of breath over the last 7 days. Per daughter, pt does not use inhaler Q4 as instructed and when it happens, pt develops SOB even with just 5 meters of walking. Pt states that she has fall 3-4 times in the last month, with last episode this morning 5AM when she was walking to the bathroom. She felt like her legs were giving up, denies any dizziness, headstrike, injury to any body parts, LOC, CP, palpitation. Pt was admitted for further workup.     Interval History: ophthalmology consulted for left medial wall fracture after fall.    PAST MEDICAL & SURGICAL HISTORY:  Seizure  x 1 1/7/18      DVT (deep venous thrombosis)  15-20 years ago, took coumadin      TIA (transient ischemic attack)  multiple, last 5 years ago - presents as right-sided weakness      COPD (chronic obstructive pulmonary disease)      Atrial fibrillation      History of COPD      Afib      Aortic valve replaced      Epilepsy      Asthma      DM (diabetes mellitus)      History of seizure disorder      History of TIAs      HTN (hypertension)      Bronchiectasis      Colon cancer      History of partial hysterectomy  30 years ago - fibroids      H/O total knee replacement, bilateral  5 years ago      History of sinus surgery  multiple sinus surgeries      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      H/O pelvic surgery  5 years ago - s/p fracture      History of tracheomalacia  2015 - attempted tracheal stenting ([facility])- course complicated by obstruction, respiratory failure, multiple CPR attempts -  stent discontinued; 10/20/2016 Tracheobronchoplasty (Prolene Mesh) performed at [facility] by Dr Zapien      S/P bronchoscopy  6/5/2018 - [place] (Dr Zapien) no evidence of tracheobronchomalacia in trachea or bronchial tubes      Rectal bleeding  exam under anesthesia ([facility]) 2/2018      S/P TAVR (transcatheter aortic valve replacement)      S/P aortic valve replacement      S/P colostomy      S/P AVR (aortic valve replacement)        Past Ocular History: ***  Ophthalmic Medications: ***  FAMILY HISTORY:  Family history of asthma    Family history of breast cancer (Sibling)    Family history of diabetes mellitus type II      Social History: ***    MEDICATIONS  (STANDING):  albuterol/ipratropium for Nebulization 3 milliLiter(s) Nebulizer every 6 hours  ascorbic acid 500 milliGRAM(s) Oral daily  buDESOnide    Inhalation Suspension 1 milliGRAM(s) Inhalation every 12 hours  chlorhexidine 2% Cloths 1 Application(s) Topical daily  dextrose 5%. 1000 milliLiter(s) (50 mL/Hr) IV Continuous <Continuous>  dextrose 5%. 1000 milliLiter(s) (100 mL/Hr) IV Continuous <Continuous>  dextrose 50% Injectable 25 Gram(s) IV Push once  dextrose 50% Injectable 12.5 Gram(s) IV Push once  dextrose 50% Injectable 25 Gram(s) IV Push once  ferrous    sulfate 325 milliGRAM(s) Oral daily  fluticasone propionate 50 MICROgram(s)/spray Nasal Spray 1 Spray(s) Both Nostrils two times a day  fluticasone propionate/ salmeterol 250-50 MICROgram(s) Diskus 1 Dose(s) Inhalation two times a day  formoterol for nebulization 20 MICROGram(s) Nebulizer two times a day  glucagon  Injectable 1 milliGRAM(s) IntraMuscular once  glycopyrrolate 1 milliGRAM(s) Oral three times a day  insulin glargine Injectable (LANTUS) 14 Unit(s) SubCutaneous every morning  insulin glargine Injectable (LANTUS) 14 Unit(s) SubCutaneous at bedtime  insulin lispro (ADMELOG) corrective regimen sliding scale   SubCutaneous three times a day before meals  insulin lispro (ADMELOG) corrective regimen sliding scale   SubCutaneous at bedtime  labetalol 100 milliGRAM(s) Oral every 8 hours  lacosamide 100 milliGRAM(s) Oral two times a day  levETIRAcetam 1000 milliGRAM(s) Oral two times a day  megestrol 20 milliGRAM(s) Oral daily  mexiletine 200 milliGRAM(s) Oral three times a day  mineral oil 30 milliLiter(s) Oral daily  montelukast 10 milliGRAM(s) Oral at bedtime  multivitamin 1 Tablet(s) Oral daily  mupirocin 2% Nasal 1 Application(s) Both Nostrils two times a day  NIFEdipine XL 60 milliGRAM(s) Oral daily  Ohtuvayre [Ensifentrine] 3mG/2.5mL Nebul 3 milliGRAM(s)   Nebulizer two times a day  pantoprazole    Tablet 40 milliGRAM(s) Oral before breakfast  polyethylene glycol 3350 17 Gram(s) Oral daily  predniSONE   Tablet 40 milliGRAM(s) Oral daily  QUEtiapine 12.5 milliGRAM(s) Oral at bedtime  rosuvastatin 5 milliGRAM(s) Oral at bedtime  senna 2 Tablet(s) Oral at bedtime  sodium chloride 7% Inhalation 4 milliLiter(s) Inhalation every 12 hours  tiotropium 2.5 MICROgram(s) Inhaler 2 Puff(s) Inhalation daily    MEDICATIONS  (PRN):  dextrose Oral Gel 15 Gram(s) Oral once PRN Blood Glucose LESS THAN 70 milliGRAM(s)/deciliter  dextrose Oral Gel 15 Gram(s) Oral once PRN Blood Glucose LESS THAN 70 milliGRAM(s)/deciliter  magnesium citrate Oral Solution 296 milliLiter(s) Oral every 24 hours PRN Constipation  sertraline 50 milliGRAM(s) Oral daily PRN for anxiety    Allergies & Intolerances:   aspirin (Unknown)  codeine (Unknown)  codeine (Short breath)  Valium (Unknown)  cefepime (Short breath (Severe))  shellfish (Anaphylaxis)  penicillin (Anaphylaxis)  cefepime (Anaphylaxis)  penicillin (Unknown)  Percocet (Unknown)  Avelox (Short breath; Pruritus)  Dilaudid (Short breath)  ampicillin (Unknown)  Valium (Short breath)  OxyContin (Unknown)  aspirin (Short breath)  iodine (Short breath; Swelling)  tetanus toxoid (Short breath)  Avelox (Unknown)  meropenem (Unknown)    Review of Systems:  Constitutional: No fever, chills  Eyes: No blurry vision, flashes, floaters, FBS, erythema, discharge, double vision, OU  Neuro: No tremors  Cardiovascular: No chest pain, palpitations  Respiratory: No SOB, no cough  GI: No nausea, vomiting, abdominal pain  : No dysuria  Skin: no rash  Psych: no depression  Endocrine: no polyuria, polydipsia  Heme/lymph: no swelling    VITALS: T(C): 36.9 (04-20-25 @ 12:13)  T(F): 98.4 (04-20-25 @ 12:13), Max: 98.4 (04-20-25 @ 12:13)  HR: 75 (04-20-25 @ 12:13) (51 - 75)  BP: 119/57 (04-20-25 @ 12:13) (119/57 - 174/89)  RR:  (16 - 18)  SpO2:  (94% - 98%)  Wt(kg): --  General: AAO x 3, appropriate mood and affect    Ophthalmology Exam:  Visual acuity (sc): 20/20 OD 20/20 OS  Pupils: PERRL OU, no APD  Ttono: 16 OU  Extraocular movements (EOMs): Full OU, no pain, no diplopia  Confrontational Visual Field (CVF): Full OU  Color Plates: 12/12 OD 12/12 OS    Pen Light Exam (PLE)  External: Flat OU  Lids/Lashes/Lacrimal Ducts: Flat OU    Sclera/Conjunctiva: W+Q OU  Cornea: Cl OU  Anterior Chamber: D+F OU    Iris: Flat OU  Lens: Cl OU    Fundus Exam: dilated with 1% tropicamide and 2.5% phenylephrine  Approval obtained from primary team for dilation  Patient aware that pupils can remained dilated for at least 4-6 hours  Exam performed with 20D lens    Vitreous: wnl OU  Disc, cup/disc: sharp and pink, 0.4 OU  Macula: wnl OU  Vessels: wnl OU  Periphery: wnl OU    Labs/Imaging:  ***   [facility] DEPARTMENT OF OPHTHALMOLOGY - INITIAL ADULT CONSULT  -----------------------------------------------------------------------------------------------------------------  Snow Perez MD  PGY 2  Contact on Teams  -----------------------------------------------------------------------------------------------------------------    HPI:  74yFemale with pmhx of Epilepsy on Keppra, COPD, asthma  (on CPAP and VATS at home, on chronic steroids), DM2, bronchiectasis, tracheomalacia s/p tracheloplasty, HTN, Hx of dissection of descending thoracic aorta, hx of aortic aneurysm, Type B dissection (7/2024), Type A dissection s/p bioAVR with Bental procedure (9/2022), severe AS s/p TAVR (9/10/2023), TIA's, DVT, Afib on Eliquis,  Colon cancer S/P resection, colostomy bag, neuropathy, Midline for IV ABX placed 2 weeks ago for chronic "TB like" pneumonia on ertapenmen (today is supposed to be the last dose at 6pm) who presented for worsening shortness of breath over the last 7 days. Per daughter, pt does not use inhaler Q4 as instructed and when it happens, pt develops SOB even with just 5 meters of walking. Pt states that she has fall 3-4 times in the last month, with last episode this morning 5AM when she was walking to the bathroom. She felt like her legs were giving up, denies any dizziness, headstrike, injury to any body parts, LOC, CP, palpitation. Pt was admitted for further workup.     Interval History: ophthalmology consulted for left medial wall fracture after fall.    PAST MEDICAL & SURGICAL HISTORY:  Seizure  x 1 1/7/18      DVT (deep venous thrombosis)  15-20 years ago, took coumadin      TIA (transient ischemic attack)  multiple, last 5 years ago - presents as right-sided weakness      COPD (chronic obstructive pulmonary disease)      Atrial fibrillation      History of COPD      Afib      Aortic valve replaced      Epilepsy      Asthma      DM (diabetes mellitus)      History of seizure disorder      History of TIAs      HTN (hypertension)      Bronchiectasis      Colon cancer      History of partial hysterectomy  30 years ago - fibroids      H/O total knee replacement, bilateral  5 years ago      History of sinus surgery  multiple sinus surgeries      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      H/O pelvic surgery  5 years ago - s/p fracture      History of tracheomalacia  2015 - attempted tracheal stenting ([facility])- course complicated by obstruction, respiratory failure, multiple CPR attempts -  stent discontinued; 10/20/2016 Tracheobronchoplasty (Prolene Mesh) performed at [facility] by Dr Zapien      S/P bronchoscopy  6/5/2018 - [place] (Dr Zapien) no evidence of tracheobronchomalacia in trachea or bronchial tubes      Rectal bleeding  exam under anesthesia ([facility]) 2/2018      S/P TAVR (transcatheter aortic valve replacement)      S/P aortic valve replacement      S/P colostomy      S/P AVR (aortic valve replacement)        Past Ocular History: cataract surgery OD 15 years ago, globe rupture s/p conformer and prosthesis 20 years ago OS   Ophthalmic Medications: none    FAMILY HISTORY:  Family history of asthma    Family history of breast cancer (Sibling)    Family history of diabetes mellitus type II      MEDICATIONS  (STANDING):  albuterol/ipratropium for Nebulization 3 milliLiter(s) Nebulizer every 6 hours  ascorbic acid 500 milliGRAM(s) Oral daily  buDESOnide    Inhalation Suspension 1 milliGRAM(s) Inhalation every 12 hours  chlorhexidine 2% Cloths 1 Application(s) Topical daily  dextrose 5%. 1000 milliLiter(s) (50 mL/Hr) IV Continuous <Continuous>  dextrose 5%. 1000 milliLiter(s) (100 mL/Hr) IV Continuous <Continuous>  dextrose 50% Injectable 25 Gram(s) IV Push once  dextrose 50% Injectable 12.5 Gram(s) IV Push once  dextrose 50% Injectable 25 Gram(s) IV Push once  ferrous    sulfate 325 milliGRAM(s) Oral daily  fluticasone propionate 50 MICROgram(s)/spray Nasal Spray 1 Spray(s) Both Nostrils two times a day  fluticasone propionate/ salmeterol 250-50 MICROgram(s) Diskus 1 Dose(s) Inhalation two times a day  formoterol for nebulization 20 MICROGram(s) Nebulizer two times a day  glucagon  Injectable 1 milliGRAM(s) IntraMuscular once  glycopyrrolate 1 milliGRAM(s) Oral three times a day  insulin glargine Injectable (LANTUS) 14 Unit(s) SubCutaneous every morning  insulin glargine Injectable (LANTUS) 14 Unit(s) SubCutaneous at bedtime  insulin lispro (ADMELOG) corrective regimen sliding scale   SubCutaneous three times a day before meals  insulin lispro (ADMELOG) corrective regimen sliding scale   SubCutaneous at bedtime  labetalol 100 milliGRAM(s) Oral every 8 hours  lacosamide 100 milliGRAM(s) Oral two times a day  levETIRAcetam 1000 milliGRAM(s) Oral two times a day  megestrol 20 milliGRAM(s) Oral daily  mexiletine 200 milliGRAM(s) Oral three times a day  mineral oil 30 milliLiter(s) Oral daily  montelukast 10 milliGRAM(s) Oral at bedtime  multivitamin 1 Tablet(s) Oral daily  mupirocin 2% Nasal 1 Application(s) Both Nostrils two times a day  NIFEdipine XL 60 milliGRAM(s) Oral daily  Ohtuvayre [Ensifentrine] 3mG/2.5mL Nebul 3 milliGRAM(s)   Nebulizer two times a day  pantoprazole    Tablet 40 milliGRAM(s) Oral before breakfast  polyethylene glycol 3350 17 Gram(s) Oral daily  predniSONE   Tablet 40 milliGRAM(s) Oral daily  QUEtiapine 12.5 milliGRAM(s) Oral at bedtime  rosuvastatin 5 milliGRAM(s) Oral at bedtime  senna 2 Tablet(s) Oral at bedtime  sodium chloride 7% Inhalation 4 milliLiter(s) Inhalation every 12 hours  tiotropium 2.5 MICROgram(s) Inhaler 2 Puff(s) Inhalation daily    MEDICATIONS  (PRN):  dextrose Oral Gel 15 Gram(s) Oral once PRN Blood Glucose LESS THAN 70 milliGRAM(s)/deciliter  dextrose Oral Gel 15 Gram(s) Oral once PRN Blood Glucose LESS THAN 70 milliGRAM(s)/deciliter  magnesium citrate Oral Solution 296 milliLiter(s) Oral every 24 hours PRN Constipation  sertraline 50 milliGRAM(s) Oral daily PRN for anxiety    Allergies & Intolerances:   aspirin (Unknown)  codeine (Unknown)  codeine (Short breath)  Valium (Unknown)  cefepime (Short breath (Severe))  shellfish (Anaphylaxis)  penicillin (Anaphylaxis)  cefepime (Anaphylaxis)  penicillin (Unknown)  Percocet (Unknown)  Avelox (Short breath; Pruritus)  Dilaudid (Short breath)  ampicillin (Unknown)  Valium (Short breath)  OxyContin (Unknown)  aspirin (Short breath)  iodine (Short breath; Swelling)  tetanus toxoid (Short breath)  Avelox (Unknown)  meropenem (Unknown)    Review of Systems:  Constitutional: No fever, chills  Eyes: No blurry vision, flashes, floaters, FBS, erythema, discharge, double vision, OU  Neuro: No tremors    VITALS: T(C): 36.9 (04-20-25 @ 12:13)  T(F): 98.4 (04-20-25 @ 12:13), Max: 98.4 (04-20-25 @ 12:13)  HR: 75 (04-20-25 @ 12:13) (51 - 75)  BP: 119/57 (04-20-25 @ 12:13) (119/57 - 174/89)  RR:  (16 - 18)  SpO2:  (94% - 98%)  Wt(kg): --  General: AAO x 3, appropriate mood and affect    Ophthalmology Exam:  Visual acuity (sc): 20/25 OD, TRISHA OS  Pupils: pupil is round and reacts briskly OD, TRISHA OS   Ttono: 13 OD, TRISHA OS   Extraocular movements (EOMs): -1 restriction in aBduction OD, OS minimal movement with prosthesis   Confrontational Visual Field (CVF): Full OD, TRISHA OS   Color Plates: 12/12 OD TRISHA OS    Pen Light Exam (PLE)  External: Flat OD, mild periorbital edema and erythema OS  Lids/Lashes/Lacrimal Ducts: Flat OD, SHOSHANA and LLL mild edema OS   Sclera/Conjunctiva: W+Q OD, TRISHA OS  Cornea: Cl OD, TRISHA OS  Anterior Chamber: D+F OD, TRISHA OS   Iris: Flat OD, TRISHA OS   Lens: PCIOL OD, TRISHA OS     prosthesis OS remover with conformer and overlying conjunctiva intact, no bleeding, no fibrosis     Fundus Exam: dilated with 1% tropicamide and 2.5% phenylephrine  Approval obtained from primary team for dilation  Patient aware that pupils can remained dilated for at least 4-6 hours  Exam performed with 20D lens    Vitreous: wnl OD  Disc, cup/disc: sharp and pink, 0.3 OD  Macula: wnl OD  Vessels: wnl OD  Periphery: wnl OD    Labs/Imaging:  IMPRESSION:    CT HEAD: No acute intracranial hemorrhage, mass effect, or osseous fracture.    CT MAXILLOFACIAL BONES: Left periorbital hematoma with a comminuted fracture of the left medial orbital wall and resultant hemorrhagic air-fluid levels in the left ethmoid sinus, extending into the left sphenoid sinus. There is herniation of the orbital fat and small parts of the left medial rectus and inferior rectus muscles that extends into the defect. Correlate clinically for muscular entrapment.    CT CERVICAL SPINE: No acute cervical spine fracture or traumatic malalignment.

## 2025-04-20 NOTE — CONSULT NOTE ADULT - ASSESSMENT
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 Antonio, Type A dissection s/p bioAVR with Bental procedure (9/2022), severe AS s/p TAVR (9/10/2023), TIA's, DVT, Afib on Eliquis,  Colon cancer S/P resection, colostomy bag, neuropathy, Midline for IV ABX placed 2 weeks ago for chronic "TB like" pneumonia on ertapenmen (today is supposed to be the last dose at 6pm) admitted for worsening shortness of breath over the last 7 days. multiple falls, urinary burning, visual hallucination, admitted for further management of asthma/COPD exacerbation, and further eval of new onset visual hallucination.     This morning patient experienced a visual hallucination and fell while ambulating in her room. CT Maxface demonstrating a left periorbital hematoma with a comminuted fracture of the left medial orbital wall and resultant hemorrhagic air-fluid levels in the left ethmoid sinus, extending into the left sphenoid sinus. There is herniation of the orbital fat and small parts of the left medial rectus and inferior rectus muscles that extends into the defect.    PRS consulted for management of facial fracture.    >> At this time, there is no indication for acute surgical intervention.  >> Continuous ice packs or cold compresses to the affected area. 20 minutes on, 20 minutes off.  >> Pain control.  >> Head of bed elevation.  >> Ophthalmology consult.  >> Please ensure nasal precautions are followed. Please reinforce to patient: (1) do not blow your nose; (2) do not place objects in your nose; (3) do not engage in strenuous activities or activities in which facial trauma may be possible; and (4) sneeze with an open mouth.  >> Please follow up with Dr. Jansen within 1-2 weeks after discharge from the hospital. You may call [phone] to schedule an appointment.  >> Thank you for allowing us to participate in the care of this patient.    Spike Rubio  Plastic Surgery Resident PGY-3  [facility]: [phone]  [facility]: [id number]  Available on Microsoft Teams   74yFemale with pmhx of Epilepsy on Keppra, COPD, asthma  (on CPAP and VATS at home, on chronic steroids), DM2, bronchiectasis, tracheomalacia s/p tracheloplasty, HTN, Hx of dissection of descending thoracic aorta, hx of aortic aneurysm, Type B dissection (7/2024), medically managed initially as she did not meet criteria for surgery at that time), evaluated by Dr. Antonio, Type A dissection s/p bioAVR with Bental procedure (9/2022), severe AS s/p TAVR (9/10/2023), TIA's, DVT, Afib on Eliquis,  Colon cancer S/P resection, colostomy bag, neuropathy, Midline for IV ABX placed 2 weeks ago for chronic "TB like" pneumonia on ertapenmen (today is supposed to be the last dose at 6pm) admitted for worsening shortness of breath over the last 7 days. multiple falls, urinary burning, visual hallucination, admitted for further management of asthma/COPD exacerbation, and further eval of new onset visual hallucination.     This morning patient experienced a visual hallucination and fell while ambulating in her room. CT Maxface demonstrating a left periorbital hematoma with a comminuted fracture of the left medial orbital wall and resultant hemorrhagic air-fluid levels in the left ethmoid sinus, extending into the left sphenoid sinus. There is herniation of the orbital fat and small parts of the left medial rectus and inferior rectus muscles that extends into the defect.    PRS consulted for management of facial fracture.    >> At this time, there is no indication for acute surgical intervention.  >> Continuous ice packs or cold compresses to the affected area. 20 minutes on, 20 minutes off.  >> Pain control.  >> Head of bed elevation.  >> Ophthalmology consult.  >> Please ensure nasal precautions are followed. Please reinforce to patient: (1) do not blow your nose; (2) do not place objects in your nose; (3) do not engage in strenuous activities or activities in which facial trauma may be possible; and (4) sneeze with an open mouth.  >> Please follow up with Dr. Callaway within x1 week after discharge from the hospital. You may call [phone] to schedule an appointment.  >> Thank you for allowing us to participate in the care of this patient.    Spike Rubio  Plastic Surgery Resident PGY-3  [facility]: [phone]  [facility]: [id number]  Available on Microsoft Teams

## 2025-04-20 NOTE — BH CONSULTATION LIAISON PROGRESS NOTE - NSBHATTESTCOMMENTATTENDFT_PSY_A_CORE
74F , domiciled, retired , with no PPHx and PMH significant for epilepsy on Keppra, HTN, COPD, DMII, asthma, tracheomalacia s/p tracheoplasty, aortic dissection, aortic aneurysm, severe AS s/p TAVR, DVT, AFib, and colon cancer s/p resection with colostomy who is currently admitted for worsening SOB/COPD exacerbation and syncopal episode despite 2 week antibiotic treatment for pneumonia. Psychiatry consulted for evaluation of new onset visual hallucinations. Psychiatry f/u requested to evaluate for agitation, fluctuating mental status.  Pt seen at bedside, oriented to self only, aware she is in a hospital, gives illogical account of seeing "2 frogs on my bed" which caused her to get up and fall out of bed.  No SI/HI.  Daughter at bedside states pt is much more confused than usual, is not at baseline, MS has been fluctuating.  Dx: Delirium 2/2 G[facility].  Agree with Seroquel as above, f/u EKG in light of QTc 490 and hold if >500.  F/u neuro recs, ?EEG.  Agree with resident's assessment and plan as above.

## 2025-04-20 NOTE — PROGRESS NOTE ADULT - PROBLEM SELECTOR PLAN 2
- tachpechnic to RR of 30s, proBNP 3648 (baseline 1896 in Feb 2025)  -  CXR showed trace R pleural effusion.   - Received solumedrol 40 mg IV, duoneb x 3 in ED    Plan:   - c/w duoneb Q6 standing, Advair BID, Spiriva qd   - c/w budesonide 1mg Q12, montelukast 10 mg qd   - Pulm c/s, appreciate recs   - switched from methylprednisolone 60 IV qd to prednisone 40 mg qd per pulm   - full RVP - neg   - pt uses CPAP and VESE at home (pt's daughter brought them in from home)   - chest PT, hypertonic saline   -   - PT/OT OOB to chair as much as possible   - advised pt's daughter to bring in Performist BID and Ohtuvayre BID from home as pharmacy don't carry it here

## 2025-04-20 NOTE — BH CONSULTATION LIAISON PROGRESS NOTE - NSBHCONSULTRECOMMENDOTHER_PSY_A_CORE FT
- c/w Seroquel 12.5 mg qHS  - monitor QTc, maintain <500 ms - c/w Seroquel 12.5 mg qHS standing  - START melatonin 5 mg qHS standing  - monitor QTc, maintain <500 ms

## 2025-04-20 NOTE — PROVIDER CONTACT NOTE (HYPOGLYCEMIA EVENT) - NS PROVIDER CONTACT BACKGROUND-HYPO
Age: 74y    Gender: Female    POCT Blood Glucose:  197 mg/dL (04-20-25 @ 02:44)  57 mg/dL (04-20-25 @ 01:59)  112 mg/dL (04-19-25 @ 22:41)  294 mg/dL (04-19-25 @ 17:21)  279 mg/dL (04-19-25 @ 13:30)  107 mg/dL (04-19-25 @ 08:50)  97 mg/dL (04-19-25 @ 03:40)      eMAR:  dextrose 50% Injectable   25 Gram(s) IV Push (04-20-25 @ 02:19)    insulin glargine Injectable (LANTUS)   22 Unit(s) SubCutaneous (04-19-25 @ 09:00)    insulin glargine Injectable (LANTUS)   28 Unit(s) SubCutaneous (04-19-25 @ 22:44)    insulin lispro (ADMELOG) corrective regimen sliding scale   6 Unit(s) SubCutaneous (04-19-25 @ 17:48)   6 Unit(s) SubCutaneous (04-19-25 @ 13:36)    megestrol   20 milliGRAM(s) Oral (04-19-25 @ 11:20)    predniSONE   Tablet   40 milliGRAM(s) Oral (04-19-25 @ 06:07)    rosuvastatin   5 milliGRAM(s) Oral (04-19-25 @ 21:10)    
Age: 74y    Gender: Female    POCT Blood Glucose:  156 mg/dL (04-20-25 @ 08:03)  50 mg/dL (04-20-25 @ 07:14)  54 mg/dL (04-20-25 @ 07:13)  197 mg/dL (04-20-25 @ 02:44)  57 mg/dL (04-20-25 @ 01:59)  112 mg/dL (04-19-25 @ 22:41)  294 mg/dL (04-19-25 @ 17:21)  279 mg/dL (04-19-25 @ 13:30)      eMAR:  dextrose 50% Injectable   25 milliLiter(s) IV Push (04-20-25 @ 07:36)    dextrose 50% Injectable   25 Gram(s) IV Push (04-20-25 @ 02:19)    insulin glargine Injectable (LANTUS)   22 Unit(s) SubCutaneous (04-19-25 @ 09:00)    insulin glargine Injectable (LANTUS)   28 Unit(s) SubCutaneous (04-19-25 @ 22:44)    insulin lispro (ADMELOG) corrective regimen sliding scale   6 Unit(s) SubCutaneous (04-19-25 @ 17:48)   6 Unit(s) SubCutaneous (04-19-25 @ 13:36)    megestrol   20 milliGRAM(s) Oral (04-19-25 @ 11:20)    predniSONE   Tablet   40 milliGRAM(s) Oral (04-20-25 @ 06:06)    rosuvastatin   5 milliGRAM(s) Oral (04-19-25 @ 21:10)    
Age: 74y    Gender: Female    POCT Blood Glucose:  156 mg/dL (04-20-25 @ 08:03)  50 mg/dL (04-20-25 @ 07:14)  54 mg/dL (04-20-25 @ 07:13)  197 mg/dL (04-20-25 @ 02:44)  57 mg/dL (04-20-25 @ 01:59)  112 mg/dL (04-19-25 @ 22:41)  294 mg/dL (04-19-25 @ 17:21)  279 mg/dL (04-19-25 @ 13:30)      eMAR:  dextrose 50% Injectable   25 milliLiter(s) IV Push (04-20-25 @ 07:36)    dextrose 50% Injectable   25 Gram(s) IV Push (04-20-25 @ 02:19)    insulin glargine Injectable (LANTUS)   22 Unit(s) SubCutaneous (04-19-25 @ 09:00)    insulin glargine Injectable (LANTUS)   28 Unit(s) SubCutaneous (04-19-25 @ 22:44)    insulin lispro (ADMELOG) corrective regimen sliding scale   6 Unit(s) SubCutaneous (04-19-25 @ 17:48)   6 Unit(s) SubCutaneous (04-19-25 @ 13:36)    megestrol   20 milliGRAM(s) Oral (04-19-25 @ 11:20)    predniSONE   Tablet   40 milliGRAM(s) Oral (04-20-25 @ 06:06)    rosuvastatin   5 milliGRAM(s) Oral (04-19-25 @ 21:10)

## 2025-04-20 NOTE — PROGRESS NOTE ADULT - PROBLEM SELECTOR PLAN 9
- Pt takes lantus 22 in AM, 28 at night    Plan:   - check A1C - 8   - place pt back at home dose given likely will have hyperglycemia from steroid - lantus 22 in AM and 28 at night   - FS premeal and qhs   - nutrition c/s

## 2025-04-20 NOTE — PROGRESS NOTE ADULT - SUBJECTIVE AND OBJECTIVE BOX
***************************************************************  Pepito Gonzalez, PG2  Internal Medicine   Pager:  TEAMS  ***************************************************************    RAYRAY RODRIGUEZ  74y  MRN: [id number]    Patient is a 74y old  Female who presents with a chief complaint of visual hallucination, multiple falls, SOB (19 Apr 2025 11:40)      Interval/Overnight Events: found new T wave inversion most prominently in V2, pt asymtomatic, cardiac enzymes negative, CKMB, cards c/s, TTE, repeat EKG, f/u neuro, uptrending lactate -> IVF with resolution.  Hypoglycemic overnight s/p 1 amp D50    Subjective: Pt seen and examined at bedside. Denies fever, CP, SOB, abn pain, N/V, dysuria. Tolerating diet.      MEDICATIONS  (STANDING):  albuterol/ipratropium for Nebulization 3 milliLiter(s) Nebulizer every 6 hours  apixaban 5 milliGRAM(s) Oral every 12 hours  ascorbic acid 500 milliGRAM(s) Oral daily  buDESOnide    Inhalation Suspension 1 milliGRAM(s) Inhalation every 12 hours  clopidogrel Tablet 75 milliGRAM(s) Oral daily  dextrose 5%. 1000 milliLiter(s) (100 mL/Hr) IV Continuous <Continuous>  dextrose 5%. 1000 milliLiter(s) (50 mL/Hr) IV Continuous <Continuous>  dextrose 50% Injectable 25 Gram(s) IV Push once  dextrose 50% Injectable 12.5 Gram(s) IV Push once  dextrose 50% Injectable 25 Gram(s) IV Push once  ferrous    sulfate 325 milliGRAM(s) Oral daily  fluticasone propionate 50 MICROgram(s)/spray Nasal Spray 1 Spray(s) Both Nostrils two times a day  fluticasone propionate/ salmeterol 250-50 MICROgram(s) Diskus 1 Dose(s) Inhalation two times a day  glucagon  Injectable 1 milliGRAM(s) IntraMuscular once  glycopyrrolate 1 milliGRAM(s) Oral three times a day  insulin glargine Injectable (LANTUS) 22 Unit(s) SubCutaneous every morning  insulin glargine Injectable (LANTUS) 28 Unit(s) SubCutaneous at bedtime  insulin lispro (ADMELOG) corrective regimen sliding scale   SubCutaneous three times a day before meals  insulin lispro (ADMELOG) corrective regimen sliding scale   SubCutaneous at bedtime  labetalol 100 milliGRAM(s) Oral every 8 hours  lacosamide 100 milliGRAM(s) Oral two times a day  levETIRAcetam 1000 milliGRAM(s) Oral two times a day  megestrol 20 milliGRAM(s) Oral daily  mexiletine 200 milliGRAM(s) Oral three times a day  mineral oil 30 milliLiter(s) Oral daily  montelukast 10 milliGRAM(s) Oral at bedtime  multivitamin 1 Tablet(s) Oral daily  mupirocin 2% Nasal 1 Application(s) Both Nostrils two times a day  NIFEdipine XL 60 milliGRAM(s) Oral daily  pantoprazole    Tablet 40 milliGRAM(s) Oral before breakfast  polyethylene glycol 3350 17 Gram(s) Oral daily  predniSONE   Tablet 40 milliGRAM(s) Oral daily  rosuvastatin 5 milliGRAM(s) Oral at bedtime  senna 2 Tablet(s) Oral at bedtime  sodium chloride 7% Inhalation 4 milliLiter(s) Inhalation every 12 hours  tiotropium 2.5 MICROgram(s) Inhaler 2 Puff(s) Inhalation daily    MEDICATIONS  (PRN):  dextrose Oral Gel 15 Gram(s) Oral once PRN Blood Glucose LESS THAN 70 milliGRAM(s)/deciliter  magnesium citrate Oral Solution 296 milliLiter(s) Oral every 24 hours PRN Constipation  sertraline 50 milliGRAM(s) Oral daily PRN for anxiety      Objective:    Vitals: Vital Signs Last 24 Hrs  T(C): 36.7 (04-20-25 @ 04:12), Max: 36.7 (04-20-25 @ 04:12)  T(F): 98.1 (04-20-25 @ 04:12), Max: 98.1 (04-20-25 @ 04:12)  HR: 63 (04-20-25 @ 04:12) (47 - 63)  BP: 174/89 (04-20-25 @ 04:12) (126/61 - 174/89)  BP(mean): --  RR: 18 (04-20-25 @ 04:12) (16 - 18)  SpO2: 95% (04-20-25 @ 04:12) (93% - 97%)                I&O's Summary    18 Apr 2025 07:01  -  19 Apr 2025 07:00  --------------------------------------------------------  IN: 240 mL / OUT: 100 mL / NET: 140 mL    19 Apr 2025 07:01  -  20 Apr 2025 06:54  --------------------------------------------------------  IN: 0 mL / OUT: 1700 mL / NET: -1700 mL        PHYSICAL EXAM:  GENERAL: NAD  HEAD:  Atraumatic, Normocephalic  EYES: EOMI, conjunctiva and sclera clear  CHEST/LUNG: Clear to auscultation bilaterally; No rales, rhonchi, wheezing, or rubs  HEART: Regular rate and rhythm; No murmurs, rubs, or gallops  ABDOMEN: Soft, Nontender, Nondistended;   SKIN: No rashes or lesions  NERVOUS SYSTEM:  Alert & Oriented X3, no focal deficits    LABS:                        12.3   12.47 )-----------( 335      ( 19 Apr 2025 07:02 )             39.5                         10.9   10.55 )-----------( 284      ( 18 Apr 2025 10:27 )             35.2                         11.0   8.83  )-----------( 292      ( 17 Apr 2025 14:06 )             35.7     04-19    144  |  105  |  21  ----------------------------<  58[L]  4.2   |  23  |  0.78  04-18    138  |  104  |  16  ----------------------------<  250[H]  4.8   |  23  |  0.58  04-17    x   |  x   |  x   ----------------------------<  x   See Note   |  x   |  x     Ca    9.8      19 Apr 2025 07:06  Ca    9.1      18 Apr 2025 10:27  Ca    9.3      17 Apr 2025 14:06  Phos  1.8     04-19  Mg     2.6     04-19    TPro  7.2  /  Alb  4.0  /  TBili  0.3  /  DBili  x   /  AST  33  /  ALT  44  /  AlkPhos  100  04-19  TPro  6.4  /  Alb  3.6  /  TBili  0.2  /  DBili  x   /  AST  19  /  ALT  31  /  AlkPhos  90  04-18  TPro  6.8  /  Alb  3.7  /  TBili  0.4  /  DBili  x   /  AST  45[H]  /  ALT  44  /  AlkPhos  92  04-17    CAPILLARY BLOOD GLUCOSE      POCT Blood Glucose.: 197 mg/dL (20 Apr 2025 02:44)  POCT Blood Glucose.: 57 mg/dL (20 Apr 2025 01:59)  POCT Blood Glucose.: 112 mg/dL (19 Apr 2025 22:41)  POCT Blood Glucose.: 294 mg/dL (19 Apr 2025 17:21)  POCT Blood Glucose.: 279 mg/dL (19 Apr 2025 13:30)  POCT Blood Glucose.: 107 mg/dL (19 Apr 2025 08:50)        Urinalysis Basic - ( 19 Apr 2025 07:06 )    Color: x / Appearance: x / SG: x / pH: x  Gluc: 58 mg/dL / Ketone: x  / Bili: x / Urobili: x   Blood: x / Protein: x / Nitrite: x   Leuk Esterase: x / RBC: x / WBC x   Sq Epi: x / Non Sq Epi: x / Bacteria: x          RADIOLOGY & ADDITIONAL TESTS:    Imaging Personally Reviewed:  [x ] YES  [ ] NO    Consultants involved in case:   Consultant(s) Notes Reviewed:  [ x] YES  [ ] NO:   Care Discussed with Consultants/Other Providers [x ] YES  [ ] NO         ***************************************************************  Pepito Gonzalez, PG2  Internal Medicine   Pager:  TEAMS  ***************************************************************    RAYRAY RODRIGUEZ  74y  MRN: [id number]    Patient is a 74y old  Female who presents with a chief complaint of visual hallucination, multiple falls, SOB (19 Apr 2025 11:40)      Interval/Overnight Events: found new T wave inversion most prominently in V2, pt asymtomatic, cardiac enzymes negative, CKMB, cards c/s, TTE, repeat EKG, f/u neuro, uptrending lactate -> IVF with resolution.  Hypoglycemic overnight s/p 1 amp D50    Had a fall this morning (see chart note)    Subjective: Pt seen and examined at bedside. Feels well and reports that thinking something is a hallucination is just a way of seeing things.  Still says she knows when she is hallucinating but       MEDICATIONS  (STANDING):  albuterol/ipratropium for Nebulization 3 milliLiter(s) Nebulizer every 6 hours  apixaban 5 milliGRAM(s) Oral every 12 hours  ascorbic acid 500 milliGRAM(s) Oral daily  buDESOnide    Inhalation Suspension 1 milliGRAM(s) Inhalation every 12 hours  clopidogrel Tablet 75 milliGRAM(s) Oral daily  dextrose 5%. 1000 milliLiter(s) (100 mL/Hr) IV Continuous <Continuous>  dextrose 5%. 1000 milliLiter(s) (50 mL/Hr) IV Continuous <Continuous>  dextrose 50% Injectable 25 Gram(s) IV Push once  dextrose 50% Injectable 12.5 Gram(s) IV Push once  dextrose 50% Injectable 25 Gram(s) IV Push once  ferrous    sulfate 325 milliGRAM(s) Oral daily  fluticasone propionate 50 MICROgram(s)/spray Nasal Spray 1 Spray(s) Both Nostrils two times a day  fluticasone propionate/ salmeterol 250-50 MICROgram(s) Diskus 1 Dose(s) Inhalation two times a day  glucagon  Injectable 1 milliGRAM(s) IntraMuscular once  glycopyrrolate 1 milliGRAM(s) Oral three times a day  insulin glargine Injectable (LANTUS) 22 Unit(s) SubCutaneous every morning  insulin glargine Injectable (LANTUS) 28 Unit(s) SubCutaneous at bedtime  insulin lispro (ADMELOG) corrective regimen sliding scale   SubCutaneous three times a day before meals  insulin lispro (ADMELOG) corrective regimen sliding scale   SubCutaneous at bedtime  labetalol 100 milliGRAM(s) Oral every 8 hours  lacosamide 100 milliGRAM(s) Oral two times a day  levETIRAcetam 1000 milliGRAM(s) Oral two times a day  megestrol 20 milliGRAM(s) Oral daily  mexiletine 200 milliGRAM(s) Oral three times a day  mineral oil 30 milliLiter(s) Oral daily  montelukast 10 milliGRAM(s) Oral at bedtime  multivitamin 1 Tablet(s) Oral daily  mupirocin 2% Nasal 1 Application(s) Both Nostrils two times a day  NIFEdipine XL 60 milliGRAM(s) Oral daily  pantoprazole    Tablet 40 milliGRAM(s) Oral before breakfast  polyethylene glycol 3350 17 Gram(s) Oral daily  predniSONE   Tablet 40 milliGRAM(s) Oral daily  rosuvastatin 5 milliGRAM(s) Oral at bedtime  senna 2 Tablet(s) Oral at bedtime  sodium chloride 7% Inhalation 4 milliLiter(s) Inhalation every 12 hours  tiotropium 2.5 MICROgram(s) Inhaler 2 Puff(s) Inhalation daily    MEDICATIONS  (PRN):  dextrose Oral Gel 15 Gram(s) Oral once PRN Blood Glucose LESS THAN 70 milliGRAM(s)/deciliter  magnesium citrate Oral Solution 296 milliLiter(s) Oral every 24 hours PRN Constipation  sertraline 50 milliGRAM(s) Oral daily PRN for anxiety      Objective:    Vitals: Vital Signs Last 24 Hrs  T(C): 36.7 (04-20-25 @ 04:12), Max: 36.7 (04-20-25 @ 04:12)  T(F): 98.1 (04-20-25 @ 04:12), Max: 98.1 (04-20-25 @ 04:12)  HR: 63 (04-20-25 @ 04:12) (47 - 63)  BP: 174/89 (04-20-25 @ 04:12) (126/61 - 174/89)  BP(mean): --  RR: 18 (04-20-25 @ 04:12) (16 - 18)  SpO2: 95% (04-20-25 @ 04:12) (93% - 97%)                I&O's Summary    18 Apr 2025 07:01  -  19 Apr 2025 07:00  --------------------------------------------------------  IN: 240 mL / OUT: 100 mL / NET: 140 mL    19 Apr 2025 07:01  -  20 Apr 2025 06:54  --------------------------------------------------------  IN: 0 mL / OUT: 1700 mL / NET: -1700 mL        PHYSICAL EXAM:  GENERAL: NAD  HEAD:  Atraumatic, Normocephalic  EYES: EOMI, conjunctiva and sclera clear  CHEST/LUNG: Clear to auscultation bilaterally; No rales, rhonchi, wheezing, or rubs  HEART: Regular rate and rhythm; No murmurs, rubs, or gallops  ABDOMEN: Soft, Nontender, Nondistended;   SKIN: No rashes or lesions  NERVOUS SYSTEM:  Alert & Oriented X3, no focal deficits    LABS:                        12.3   12.47 )-----------( 335      ( 19 Apr 2025 07:02 )             39.5                         10.9   10.55 )-----------( 284      ( 18 Apr 2025 10:27 )             35.2                         11.0   8.83  )-----------( 292      ( 17 Apr 2025 14:06 )             35.7     04-19    144  |  105  |  21  ----------------------------<  58[L]  4.2   |  23  |  0.78  04-18    138  |  104  |  16  ----------------------------<  250[H]  4.8   |  23  |  0.58  04-17    x   |  x   |  x   ----------------------------<  x   See Note   |  x   |  x     Ca    9.8      19 Apr 2025 07:06  Ca    9.1      18 Apr 2025 10:27  Ca    9.3      17 Apr 2025 14:06  Phos  1.8     04-19  Mg     2.6     04-19    TPro  7.2  /  Alb  4.0  /  TBili  0.3  /  DBili  x   /  AST  33  /  ALT  44  /  AlkPhos  100  04-19  TPro  6.4  /  Alb  3.6  /  TBili  0.2  /  DBili  x   /  AST  19  /  ALT  31  /  AlkPhos  90  04-18  TPro  6.8  /  Alb  3.7  /  TBili  0.4  /  DBili  x   /  AST  45[H]  /  ALT  44  /  AlkPhos  92  04-17    CAPILLARY BLOOD GLUCOSE      POCT Blood Glucose.: 197 mg/dL (20 Apr 2025 02:44)  POCT Blood Glucose.: 57 mg/dL (20 Apr 2025 01:59)  POCT Blood Glucose.: 112 mg/dL (19 Apr 2025 22:41)  POCT Blood Glucose.: 294 mg/dL (19 Apr 2025 17:21)  POCT Blood Glucose.: 279 mg/dL (19 Apr 2025 13:30)  POCT Blood Glucose.: 107 mg/dL (19 Apr 2025 08:50)        Urinalysis Basic - ( 19 Apr 2025 07:06 )    Color: x / Appearance: x / SG: x / pH: x  Gluc: 58 mg/dL / Ketone: x  / Bili: x / Urobili: x   Blood: x / Protein: x / Nitrite: x   Leuk Esterase: x / RBC: x / WBC x   Sq Epi: x / Non Sq Epi: x / Bacteria: x          RADIOLOGY & ADDITIONAL TESTS:    Imaging Personally Reviewed:  [x ] YES  [ ] NO    Consultants involved in case:   Consultant(s) Notes Reviewed:  [ x] YES  [ ] NO:   Care Discussed with Consultants/Other Providers [x ] YES  [ ] NO

## 2025-04-20 NOTE — BH CONSULTATION LIAISON PROGRESS NOTE - NSBHCONSULTFOLLOWAFTERCARE_PSY_A_CORE FT
Follow up with [facility] outpatient clinic ([phone]) as needed  OP psych f/u at [facility]- [phone]  [facility] clinic- [phone]

## 2025-04-20 NOTE — PROGRESS NOTE ADULT - PROBLEM SELECTOR PLAN 5
- pt presented with multiple falls in the past month     Plan:   - f/u infectious work up as mentioned above   - PT/OT/OOB to chair   - nutrition consult  - check orthostatic VS - negative - pt presented with multiple falls in the past month   - Had an inpatient fall 4/20 with face strike and bloody nose and bruised zygomatic process  - CT pelvis with background of bilateral avascular necrosis of the hips      Plan:   - f/u infectious work up as mentioned above   - PT/OT/OOB to chair   - nutrition consult  - check orthostatic VS - negative  *** Will need follow up with orthopedics as an outpatient, can try with Dr Benjamin  - 1:1  - CT Head and maxillofacial pending

## 2025-04-20 NOTE — CONSULT NOTE ADULT - ASSESSMENT
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 Thre is herniation of the orbital fat and small parts of the left medial rectus and inferior rectus muscles that extends into the defect.   -Imaging reviewed with oculoplastics   -Prosthetic removed and examined to be intact with no underlying fibrosis or heme, conformer with overlying conj appear intact and flat  -No entrapment symptoms i.e nausea/vomiting, bradycardia.  -On exam, VA 20/25 OD, IOP 13, pupil is round and briskly reactive OD, CVF OD full   -EOM OD with -1 deficit in aBduction (possible effort vs possible thyroidopathy) otherwise full OD, OS with minimal movements in all directions (stable per daughter)   -external exam with mild periorbital edema and erythema OS, OD exam is wnl   -DFE OD with 0.3 ONH, flat macula, peripheral retina wnl   -TSH 0.62, free thyroxine 1.4 on 4/18/25   -No indication for surgical intervention from ophthalmic standpoint  -OMFS/facial plastics evaluation   -Ice packs to left eye q1h to the left periorbital area   -Pt was instructed to avoid nose blowing, straining, sneezing, no straws. Head of bed elevation at 30-degrees when at rest.  -Rest of care per medicine     DW Dr. Emanuel, oculoplastics attending       Outpatient Follow-up: Patient should follow-up with his/her ophthalmologist or with [facility] Department of Ophthalmology within 1 week of after discharge at:    [address]  [place] [address]  [phone]    Snow Perez MD, PGY-2  Also available on [employer] Teams

## 2025-04-20 NOTE — CONSULT NOTE ADULT - SUBJECTIVE AND OBJECTIVE BOX
Cardiovascular Disease Initial Evaluation  Date of service: 04-20-25 @ 15:19    CHIEF COMPLAINT:  SOB, Falls    HISTORY OF PRESENT ILLNESS: 74yFemale with pmhx of Epilepsy on Keppra, COPD, asthma  (on CPAP and VATS at home, on chronic steroids), DM2, bronchiectasis, tracheomalacia s/p tracheloplasty, HTN, Hx of dissection of descending thoracic aorta, hx of aortic aneurysm, Type B dissection (7/2024), medically managed initially as she did not meet criteria for surgery at that time), evaluated by Dr. Antonio, Type A dissection s/p bioAVR with Bental procedure (9/2022), severe AS s/p TAVR (9/10/2023), TIA's, DVT, Afib on Eliquis,  Colon cancer S/P resection, colostomy bag, neuropathy, Midline for IV ABX placed 2 weeks ago for chronic "TB like" pneumonia, admitted for worsening shortness of breath, multiple falls, urinary burning, visual hallucination, admitted for further management of asthma/COPD exacerbation, and further eval of new onset visual hallucination.     Cardiology consulted for EKG changes. Patient denies chest pain.     This morning patient experienced a visual hallucination and fell while ambulating in her room. CT [place] demonstrating a left periorbital hematoma with a comminuted fracture of the left medial orbital wall and resultant hemorrhagic air-fluid levels in the left ethmoid sinus, extending into the left sphenoid sinus. There is herniation of the orbital fat and small parts of the left medial rectus and inferior rectus muscles that extends into the defect.      Allergies    aspirin (Unknown)  codeine (Unknown)  codeine (Short breath)  Valium (Unknown)  cefepime (Short breath (Severe))  shellfish (Anaphylaxis)  penicillin (Anaphylaxis)  cefepime (Anaphylaxis)  penicillin (Unknown)  Percocet (Unknown)  Avelox (Short breath; Pruritus)  Dilaudid (Short breath)  ampicillin (Unknown)  Valium (Short breath)  OxyContin (Unknown)  aspirin (Short breath)  iodine (Short breath; Swelling)  tetanus toxoid (Short breath)  Avelox (Unknown)  meropenem (Unknown)    Intolerances    	    MEDICATIONS:  labetalol 100 milliGRAM(s) Oral every 8 hours  mexiletine 200 milliGRAM(s) Oral three times a day  NIFEdipine XL 60 milliGRAM(s) Oral daily      albuterol/ipratropium for Nebulization 3 milliLiter(s) Nebulizer every 6 hours  buDESOnide    Inhalation Suspension 1 milliGRAM(s) Inhalation every 12 hours  fluticasone propionate/ salmeterol 250-50 MICROgram(s) Diskus 1 Dose(s) Inhalation two times a day  formoterol for nebulization 20 MICROGram(s) Nebulizer two times a day  montelukast 10 milliGRAM(s) Oral at bedtime  sodium chloride 7% Inhalation 4 milliLiter(s) Inhalation every 12 hours  tiotropium 2.5 MICROgram(s) Inhaler 2 Puff(s) Inhalation daily    lacosamide 100 milliGRAM(s) Oral two times a day  levETIRAcetam 1000 milliGRAM(s) Oral two times a day  QUEtiapine 12.5 milliGRAM(s) Oral at bedtime  sertraline 50 milliGRAM(s) Oral daily PRN    glycopyrrolate 1 milliGRAM(s) Oral three times a day  magnesium citrate Oral Solution 296 milliLiter(s) Oral every 24 hours PRN  mineral oil 30 milliLiter(s) Oral daily  pantoprazole    Tablet 40 milliGRAM(s) Oral before breakfast  polyethylene glycol 3350 17 Gram(s) Oral daily  senna 2 Tablet(s) Oral at bedtime    dextrose 50% Injectable 25 Gram(s) IV Push once  dextrose 50% Injectable 12.5 Gram(s) IV Push once  dextrose 50% Injectable 25 Gram(s) IV Push once  dextrose Oral Gel 15 Gram(s) Oral once PRN  dextrose Oral Gel 15 Gram(s) Oral once PRN  glucagon  Injectable 1 milliGRAM(s) IntraMuscular once  insulin glargine Injectable (LANTUS) 14 Unit(s) SubCutaneous every morning  insulin glargine Injectable (LANTUS) 14 Unit(s) SubCutaneous at bedtime  insulin lispro (ADMELOG) corrective regimen sliding scale   SubCutaneous three times a day before meals  insulin lispro (ADMELOG) corrective regimen sliding scale   SubCutaneous at bedtime  megestrol 20 milliGRAM(s) Oral daily  predniSONE   Tablet 40 milliGRAM(s) Oral daily  rosuvastatin 5 milliGRAM(s) Oral at bedtime    ascorbic acid 500 milliGRAM(s) Oral daily  chlorhexidine 2% Cloths 1 Application(s) Topical daily  dextrose 5%. 1000 milliLiter(s) IV Continuous <Continuous>  dextrose 5%. 1000 milliLiter(s) IV Continuous <Continuous>  ferrous    sulfate 325 milliGRAM(s) Oral daily  fluticasone propionate 50 MICROgram(s)/spray Nasal Spray 1 Spray(s) Both Nostrils two times a day  multivitamin 1 Tablet(s) Oral daily  mupirocin 2% Nasal 1 Application(s) Both Nostrils two times a day      PAST MEDICAL & SURGICAL HISTORY:  Seizure  x 1 1/7/18      DVT (deep venous thrombosis)  15-20 years ago, took coumadin      TIA (transient ischemic attack)  multiple, last 5 years ago - presents as right-sided weakness      COPD (chronic obstructive pulmonary disease)      Atrial fibrillation      History of COPD      Afib      Aortic valve replaced      Epilepsy      Asthma      DM (diabetes mellitus)      History of seizure disorder      History of TIAs      HTN (hypertension)      Bronchiectasis      Colon cancer      History of partial hysterectomy  30 years ago - fibroids      H/O total knee replacement, bilateral  5 years ago      History of sinus surgery  multiple sinus surgeries      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 by Dr Zapien      S/P bronchoscopy  6/5/2018 - [place] (Dr Zapien) no evidence of tracheobronchomalacia in trachea or bronchial tubes      Rectal bleeding  exam under anesthesia ([facility]) 2/2018      S/P TAVR (transcatheter aortic valve replacement)      S/P aortic valve replacement      S/P colostomy      S/P AVR (aortic valve replacement)          FAMILY HISTORY:  Family history of asthma    Family history of breast cancer (Sibling)    Family history of diabetes mellitus type II        SOCIAL HISTORY:    The patient is a nonsmoker       REVIEW OF SYSTEMS:  See HPI, otherwise complete 14 point review of systems negative    [ ] All others negative	  [ ] Unable to obtain    PHYSICAL EXAM:  T(C): 36.9 (04-20-25 @ 12:13), Max: 36.9 (04-20-25 @ 12:13)  HR: 76 (04-20-25 @ 14:33) (51 - 76)  BP: 160/82 (04-20-25 @ 14:33) (119/57 - 174/89)  RR: 16 (04-20-25 @ 14:33) (16 - 18)  SpO2: 95% (04-20-25 @ 14:33) (94% - 98%)  Wt(kg): --  I&O's Summary    19 Apr 2025 07:01  -  20 Apr 2025 07:00  --------------------------------------------------------  IN: 0 mL / OUT: 2300 mL / NET: -2300 mL        Appearance: No Acute Distress; resting comfortably  HEENT:  Normal oral mucosa, PERRL, EOMI, proptosis   Cardiovascular: Normal S1 S2, No JVD, No murmurs/rubs/gallops  Respiratory: Normal respiratory effort; Lungs clear to auscultation bilaterally  Gastrointestinal:  Soft, Non-tender, + BS	  Skin: No rashes, No ecchymoses, No cyanosis	  Neurologic: Non-focal; no weakness  Extremities: No clubbing, cyanosis or edema  Vascular: Peripheral pulses palpable 2+ bilaterally  Psychiatry: Alert    Laboratory Data:	 	    CBC Full  -  ( 20 Apr 2025 07:23 )  WBC Count : 11.19 K/uL  Hemoglobin : 12.1 g/dL  Hematocrit : 39.8 %  Platelet Count - Automated : 321 K/uL  Mean Cell Volume : 78.7 fl  Mean Cell Hemoglobin : 23.9 pg  Mean Cell Hemoglobin Concentration : 30.4 g/dL  Auto Neutrophil # : x  Auto Lymphocyte # : x  Auto Monocyte # : x  Auto Eosinophil # : x  Auto Basophil # : x  Auto Neutrophil % : x  Auto Lymphocyte % : x  Auto Monocyte % : x  Auto Eosinophil % : x  Auto Basophil % : x    04-20    144  |  108  |  13  ----------------------------<  44[LL]  3.7   |  21[L]  |  0.46[L]  04-19    144  |  105  |  21  ----------------------------<  58[L]  4.2   |  23  |  0.78    Ca    9.4      20 Apr 2025 07:21  Ca    9.8      19 Apr 2025 07:06  Phos  2.6     04-20  Phos  1.8     04-19  Mg     2.4     04-20  Mg     2.6     04-19    TPro  7.0  /  Alb  3.8  /  TBili  0.3  /  DBili  x   /  AST  23  /  ALT  34  /  AlkPhos  92  04-20  TPro  7.2  /  Alb  4.0  /  TBili  0.3  /  DBili  x   /  AST  33  /  ALT  44  /  AlkPhos  100  04-19      proBNP:   Lipid Profile:   HgA1c:   TSH:       CARDIAC MARKERS: Trop 33-23            Interpretation of Telemetry: N/a	    ECG: Sinus kirk RBBB and LAFB TWI inferoseptal leads.   RADIOLOGY:  OTHER: 	    PREVIOUS DIAGNOSTIC TESTING:    [x ] Echocardiogram: 2024      1. Left ventricular cavity is normal in size. Left ventricular wall thickness is normal. Left ventricular systolic function is hyperdynamic with an ejection fraction of 75 % by Felipe's method of disks.   2. Normal right ventricular cavity size, with normal wall thickness, and normal right ventricular systolic function. Tricuspid annular plane systolic excursion (TAPSE) is 1.6 cm (normal >=1.7 cm).   3. No pericardial effusion seen.   4. Compared to the transthoracic echocardiogram performed on 9/10/2023, there have been no significant interval changes.   5. There is increased LV mass and concentric hypertrophy.    [ ] Catheterization:  [ ] Stress Test:  	    Assessment:  74y Female with pmhx of Epilepsy on Keppra, COPD, asthma, DM2, tracheomalacia s/p tracheloplasty, HTN, Hx of dissection of descending thoracic aorta, hx of aortic aneurysm, Type B dissection (7/2024), Type A dissection s/p bioAVR with Bental procedure (9/2022), severe AS s/p TAVR (9/10/2023), TIA's, DVT, Afib on Eliquis,  Colon cancer S/P resection, colostomy bag, neuropathy, admitted for worsening shortness of breath, multiple fals    Plan of Care:    #Abnormal EKG  - EKG this admission shows sinus rhythm with Bifascicular block with TWI in the inferoseptal leads.   - Subtle new findings  - No evidence of ACS  - Troponin negative  - Patient denies chest pain   - No further inpatient cardiac workup anticipated at this time    #PAF  - AC on hold due to frequent falls  - Patient with Fall 4/20 in the AM due to hallucinations    #AS  - S/p TAVR 2023    #HTN  - Bp labile  - Check orthostatics  - Given history of frequent falls, would be lenient with BP Control     Discussed plan of care with patient and daughter at bedside.       High and complex medical decision making in a patient with multiple co-morbidities.   77 minutes spent on total encounter; more than 50% of the visit was spent counseling and/or coordinating care by the attending physician.   	  Cosmo Bradshaw,  [facility]  Cardiovascular Diseases  [phone]

## 2025-04-20 NOTE — PROGRESS NOTE ADULT - ASSESSMENT
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 Antonio, Type A dissection s/p bioAVR with Bental procedure (9/2022), severe AS s/p TAVR (9/10/2023), TIA's, DVT, Afib on Eliquis,  Colon cancer S/P resection, colostomy bag, neuropathy, Midline for IV ABX placed 2 weeks ago for chronic "TB like" pneumonia on ertapenmen (today is supposed to be the last dose at 6pm) presenting today for worsening shortness of breath over the last 7 days. multiple falls, urinary burning, visual hallucination, admitted for further management of asthma/COPD exacerbation, and further eval of new onset visual hallucination. 4/17 Noted new T wave inversion most prominently in V2.

## 2025-04-20 NOTE — BH CONSULTATION LIAISON PROGRESS NOTE - NSBHCHARTREVIEWINVESTIGATE_PSY_A_CORE FT
< from: 12 Lead ECG (04.19.25 @ 08:11) >      Ventricular Rate 40 BPM    QRS Duration 130 ms    Q-T Interval 518 ms    QTC Calculation(Bazett) 422 ms    R Axis -72 degrees    T Axis -28 degrees    Diagnosis Line IDIOVENTRICULAR RHYTHM  LEFT AXIS DEVIATION  NON-SPECIFIC INTRA-VENTRICULAR CONDUCTION BLOCK  MINIMAL VOLTAGE CRITERIA FOR LVH, MAY BE NORMAL VARIANT ( Moro product )  T WAVE ABNORMALITY, CONSIDER ANTERIOR ISCHEMIA  ABNORMAL ECG  NO PREVIOUS ECGS AVAILABLE  Confirmed by MD Shalonda, Ene ([id number]) on 4/19/2025 9:01:55 AM    < end of copied text >    All components on Neuro screening negative, unless marked below:  Tone: cogwheel rigidity [ ], hypertonus [ ]  Movement: tremor - static [ ] intentional [ ], dysmetria [ ]  Reflexes: hyperreflexia [ ], myoclonus [ ], hyporeflexia [ ], primitive reflexes [ ]  Speech: aphasia (any subtype) [ ]      DB - 2:  Day of the Week: Correct [ ] Incorrect [ x]  Months of the Year Backwards: Correct [ ] Incorrect [x]

## 2025-04-20 NOTE — PROGRESS NOTE ADULT - PROBLEM SELECTOR PLAN 3
- 4/19 noted new T wave inversion most prominently in V2 (not seen on 4/18 EKG)   - repeat EKG still showed T2 inversion in V2,    - pt currently asymtomatic and VSS     Plan:  - check troponin - 33  - check CKMB - 3   - trend Troponin and CKMB again in 4 hours   - cards c/s, appreciate recs   - check TTE

## 2025-04-20 NOTE — PROGRESS NOTE ADULT - ATTENDING COMMENTS
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 Antonio, Type A dissection s/p bioAVR with Bental procedure (9/2022), severe AS s/p TAVR (9/10/2023), TIA's, DVT, Afib on Eliquis,  Colon cancer S/P resection, colostomy bag, neuropathy, Midline for IV ABX placed 2 weeks ago for chronic "TB like" pneumonia on ertapenmen (today is supposed to be the last dose at 6pm) presenting today for worsening shortness of breath over the last 7 days.     Plan:   - ECG with new t wave inversion of contiguous leads, trops negative. F/u with ECHO and cards consulted. Prior ECHo reviewed EF of 75%   - Lactate elevated as well, give IVF, f/u on repeat lactate. Blcx ngtd, MRSA positive (not positive prior) can consider Vanc 1x if leukocytosis increased, febrile or lactate is more elevated   - Bradycardia noted, medicatiosn readjusted with parameters. Cards consulted, recs appreciated. C/w Tele monitoring      Rest of management as per resident.     Dr. Zhu . 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 Antonio, Type A dissection s/p bioAVR with Bental procedure (9/2022), severe AS s/p TAVR (9/10/2023), TIA's, DVT, Afib on Eliquis,  Colon cancer S/P resection, colostomy bag, neuropathy, Midline for IV ABX placed 2 weeks ago for chronic "TB like" pneumonia on ertapenmen (today is supposed to be the last dose at 6pm) presenting today for worsening shortness of breath over the last 7 days. Fall during \[facility] [place] [facility]\"" 4/20, with new findings    Plan:   - Fall during \[facility] [place] [facility]\""   - < from: CT Pelvis Bony Only No Cont (04.20.25 @ 10:10) >      No acute fracture.    Degenerative changes of both hips. There is a background of avascular   necrosis of both femoral heads without articular surface collapse at this   time.      < end of copied text >    < from: CT Cervical Spine No Cont (04.20.25 @ 10:08) >        IMPRESSION:    CT HEAD: No acute intracranial hemorrhage, mass effect, or osseous   fracture.    CT MAXILLOFACIAL BONES: Left periorbital hematoma with a comminuted   fracture of the left medial orbital wall and resultant hemorrhagic   air-fluid levels in the left ethmoid sinus, extending into the left   sphenoid sinus. There is herniation of the orbital fat and small parts of   the left medial rectus and inferior rectus muscles that extends into the   defect. Correlate clinically for muscular entrapment.    CT CERVICAL SPINE: No acute cervical spine fracture or traumatic   malalignment.    < end of copied text >      < from: Xray Wrist 3 Views, Bilateral (04.20.25 @ 14:15) >        FINDINGS/  IMPRESSION:  There is no acute fracture. No dislocation. Joint spaces are maintained.   Vascular calcification is present.      < end of copied text >      - Periorbital hematoma; hold AC and Plavix. Optho and Plastic consulted  - 1:1 watch for fall; fall risk; BH recs appreciated on Seroquel already   - Monitor QTc 499-> 420; repeat ECG  - ECG 4/19 with new t wave inversion of contiguous leads, trops negative. F/u with ECHO and cards consulted. Prior ECHO reviewed EF of 75%   - Lactate elevated as well, give IVF, f/u on repeat lactate. Blcx ngtd, MRSA positive (not positive prior) can consider Vanc 1x if leukocytosis increased, febrile or lactate is more elevated   - Bradycardia noted, medications readjusted with parameters. Cards consulted, recs appreciated. C/w Tele monitoring      Rest of management as per resident.     Dr. Zhu .

## 2025-04-20 NOTE — PROVIDER CONTACT NOTE (FALL NOTIFICATION) - ACTION/TREATMENT ORDERED:
pressure to nose--epitaxis stopped after 5 mins, CT scan ordered, see fall assessment sheet, alarms activated, fall precautions

## 2025-04-20 NOTE — PROGRESS NOTE ADULT - PROBLEM SELECTOR PLAN 7
- home meds: eliquis 5mg BID     Plan:   - c/w eliquis 5 mg BID  - c/w labetalol 100 mg TID with hold parameter   - c/w mexiletine 200 mg TID

## 2025-04-20 NOTE — BH CONSULTATION LIAISON PROGRESS NOTE - NSBHASSESSMENTFT_PSY_ALL_CORE
Patient is a 74 year old female domiciled with daughter in a private residence with no significant PPHx with PMHx epilepsy, COPD, asthma, DM2, bronchiectasis, tracheomalacia s/p tracheloplasty, HTN, aortic dissection, aortic aneurysm, severe AS s/p TAVR, DVT, AFib, and colon cancer s/p resection with colostomy creation admitted for asthma/COPD exacerbation, consulted for further evaluation of two weeks of hallucinations. Given her COPD/asthma exacerbation, patient was managed with glucocorticoids which may contribute to onset of hallucinations in conjunction with history of multiple  family members in the past year.     : Psych asked to reassess patient after she fell while running away from visual hallucinations this AM. S/p Seroquel 12.5 mg. On evaluation, patient waxes and wanes between AOx1-2 and appears confused. Presentation likely c/w acute delirium. Agree with neurology recs for Seroquel 12.5 mg qHS w/careful monitoring of QTc (490 today).      Patient is a 74 year old female domiciled with daughter in a private residence with no significant PPHx with PMHx epilepsy, COPD, asthma, DM2, bronchiectasis, tracheomalacia s/p tracheloplasty, HTN, aortic dissection, aortic aneurysm, severe AS s/p TAVR, DVT, AFib, and colon cancer s/p resection with colostomy creation admitted for asthma/COPD exacerbation, consulted for further evaluation of two weeks of hallucinations. Given her COPD/asthma exacerbation, patient was managed with glucocorticoids which may contribute to onset of hallucinations in conjunction with history of multiple  family members in the past year.     : Psych asked to reassess patient after she fell while running away from visual hallucinations this AM. S/p Seroquel 12.5 mg. On evaluation, patient waxes and wanes between AOx1-2 and appears confused. Presentation c/w ongoing delirium 2/2 GMC. Agree with neurology recs for Seroquel 12.5 mg qHS w/careful monitoring of QTc (490 today).

## 2025-04-20 NOTE — PROVIDER CONTACT NOTE (HYPOGLYCEMIA EVENT) - NS PROVIDER CONTACT NOTE-TREATMENT-HYPO
Dextrose 50% 25 Grams IV Push
Dextrose 50% 25 Grams IV Push/4 oz Fruit Juice...
Dextrose 50% 25 Grams IV Push/4 oz Fruit Juice...

## 2025-04-21 DIAGNOSIS — Z79.52 LONG TERM (CURRENT) USE OF SYSTEMIC STEROIDS: ICD-10-CM

## 2025-04-21 DIAGNOSIS — E78.5 HYPERLIPIDEMIA, UNSPECIFIED: ICD-10-CM

## 2025-04-21 DIAGNOSIS — R73.9 HYPERGLYCEMIA, UNSPECIFIED: ICD-10-CM

## 2025-04-21 LAB
ALBUMIN SERPL ELPH-MCNC: 3.8 G/DL — SIGNIFICANT CHANGE UP (ref 3.3–5)
ALP SERPL-CCNC: 87 U/L — SIGNIFICANT CHANGE UP (ref 40–120)
ALT FLD-CCNC: 37 U/L — SIGNIFICANT CHANGE UP (ref 10–45)
ANION GAP SERPL CALC-SCNC: 14 MMOL/L — SIGNIFICANT CHANGE UP (ref 5–17)
AST SERPL-CCNC: 24 U/L — SIGNIFICANT CHANGE UP (ref 10–40)
BASOPHILS # BLD AUTO: 0.02 K/UL — SIGNIFICANT CHANGE UP (ref 0–0.2)
BASOPHILS NFR BLD AUTO: 0.2 % — SIGNIFICANT CHANGE UP (ref 0–2)
BILIRUB SERPL-MCNC: 0.4 MG/DL — SIGNIFICANT CHANGE UP (ref 0.2–1.2)
BUN SERPL-MCNC: 16 MG/DL — SIGNIFICANT CHANGE UP (ref 7–23)
CALCIUM SERPL-MCNC: 9.3 MG/DL — SIGNIFICANT CHANGE UP (ref 8.4–10.5)
CHLORIDE SERPL-SCNC: 106 MMOL/L — SIGNIFICANT CHANGE UP (ref 96–108)
CO2 SERPL-SCNC: 24 MMOL/L — SIGNIFICANT CHANGE UP (ref 22–31)
CREAT SERPL-MCNC: 0.77 MG/DL — SIGNIFICANT CHANGE UP (ref 0.5–1.3)
EGFR: 80 ML/MIN/1.73M2 — SIGNIFICANT CHANGE UP
EGFR: 80 ML/MIN/1.73M2 — SIGNIFICANT CHANGE UP
EOSINOPHIL # BLD AUTO: 0.09 K/UL — SIGNIFICANT CHANGE UP (ref 0–0.5)
EOSINOPHIL NFR BLD AUTO: 0.8 % — SIGNIFICANT CHANGE UP (ref 0–6)
GAS PNL BLDV: SIGNIFICANT CHANGE UP
GLUCOSE BLDC GLUCOMTR-MCNC: 148 MG/DL — HIGH (ref 70–99)
GLUCOSE BLDC GLUCOMTR-MCNC: 160 MG/DL — HIGH (ref 70–99)
GLUCOSE BLDC GLUCOMTR-MCNC: 192 MG/DL — HIGH (ref 70–99)
GLUCOSE BLDC GLUCOMTR-MCNC: 251 MG/DL — HIGH (ref 70–99)
GLUCOSE BLDC GLUCOMTR-MCNC: 53 MG/DL — CRITICAL LOW (ref 70–99)
GLUCOSE BLDC GLUCOMTR-MCNC: 65 MG/DL — LOW (ref 70–99)
GLUCOSE BLDC GLUCOMTR-MCNC: 70 MG/DL — SIGNIFICANT CHANGE UP (ref 70–99)
GLUCOSE SERPL-MCNC: 48 MG/DL — CRITICAL LOW (ref 70–99)
HCT VFR BLD CALC: 40.3 % — SIGNIFICANT CHANGE UP (ref 34.5–45)
HGB BLD-MCNC: 12.6 G/DL — SIGNIFICANT CHANGE UP (ref 11.5–15.5)
IMM GRANULOCYTES NFR BLD AUTO: 0.8 % — SIGNIFICANT CHANGE UP (ref 0–0.9)
LYMPHOCYTES # BLD AUTO: 1.68 K/UL — SIGNIFICANT CHANGE UP (ref 1–3.3)
LYMPHOCYTES # BLD AUTO: 15.9 % — SIGNIFICANT CHANGE UP (ref 13–44)
MAGNESIUM SERPL-MCNC: 2.3 MG/DL — SIGNIFICANT CHANGE UP (ref 1.6–2.6)
MCHC RBC-ENTMCNC: 24.4 PG — LOW (ref 27–34)
MCHC RBC-ENTMCNC: 31.3 G/DL — LOW (ref 32–36)
MCV RBC AUTO: 78.1 FL — LOW (ref 80–100)
MONOCYTES # BLD AUTO: 1.32 K/UL — HIGH (ref 0–0.9)
MONOCYTES NFR BLD AUTO: 12.5 % — SIGNIFICANT CHANGE UP (ref 2–14)
NEUTROPHILS # BLD AUTO: 7.4 K/UL — SIGNIFICANT CHANGE UP (ref 1.8–7.4)
NEUTROPHILS NFR BLD AUTO: 69.8 % — SIGNIFICANT CHANGE UP (ref 43–77)
NRBC BLD AUTO-RTO: 0 /100 WBCS — SIGNIFICANT CHANGE UP (ref 0–0)
PHOSPHATE SERPL-MCNC: 2.7 MG/DL — SIGNIFICANT CHANGE UP (ref 2.5–4.5)
PLATELET # BLD AUTO: 283 K/UL — SIGNIFICANT CHANGE UP (ref 150–400)
POTASSIUM SERPL-MCNC: 3.9 MMOL/L — SIGNIFICANT CHANGE UP (ref 3.5–5.3)
POTASSIUM SERPL-SCNC: 3.9 MMOL/L — SIGNIFICANT CHANGE UP (ref 3.5–5.3)
PROT SERPL-MCNC: 6.8 G/DL — SIGNIFICANT CHANGE UP (ref 6–8.3)
RBC # BLD: 5.16 M/UL — SIGNIFICANT CHANGE UP (ref 3.8–5.2)
RBC # FLD: 17.3 % — HIGH (ref 10.3–14.5)
SODIUM SERPL-SCNC: 144 MMOL/L — SIGNIFICANT CHANGE UP (ref 135–145)
WBC # BLD: 10.59 K/UL — HIGH (ref 3.8–10.5)
WBC # FLD AUTO: 10.59 K/UL — HIGH (ref 3.8–10.5)

## 2025-04-21 PROCEDURE — 99232 SBSQ HOSP IP/OBS MODERATE 35: CPT

## 2025-04-21 PROCEDURE — 99233 SBSQ HOSP IP/OBS HIGH 50: CPT | Mod: GC

## 2025-04-21 PROCEDURE — 99232 SBSQ HOSP IP/OBS MODERATE 35: CPT | Mod: GC

## 2025-04-21 PROCEDURE — G0545: CPT

## 2025-04-21 PROCEDURE — 99223 1ST HOSP IP/OBS HIGH 75: CPT

## 2025-04-21 PROCEDURE — 99233 SBSQ HOSP IP/OBS HIGH 50: CPT

## 2025-04-21 RX ORDER — INSULIN LISPRO 100 U/ML
INJECTION, SOLUTION INTRAVENOUS; SUBCUTANEOUS AT BEDTIME
Refills: 0 | Status: DISCONTINUED | OUTPATIENT
Start: 2025-04-21 | End: 2025-04-24

## 2025-04-21 RX ORDER — INSULIN GLARGINE-YFGN 100 [IU]/ML
14 INJECTION, SOLUTION SUBCUTANEOUS AT BEDTIME
Refills: 0 | Status: DISCONTINUED | OUTPATIENT
Start: 2025-04-21 | End: 2025-04-22

## 2025-04-21 RX ORDER — INSULIN LISPRO 100 U/ML
INJECTION, SOLUTION INTRAVENOUS; SUBCUTANEOUS
Refills: 0 | Status: DISCONTINUED | OUTPATIENT
Start: 2025-04-21 | End: 2025-04-24

## 2025-04-21 RX ORDER — INSULIN GLARGINE-YFGN 100 [IU]/ML
10 INJECTION, SOLUTION SUBCUTANEOUS AT BEDTIME
Refills: 0 | Status: DISCONTINUED | OUTPATIENT
Start: 2025-04-21 | End: 2025-04-21

## 2025-04-21 RX ORDER — MELATONIN 5 MG
3 TABLET ORAL AT BEDTIME
Refills: 0 | Status: DISCONTINUED | OUTPATIENT
Start: 2025-04-21 | End: 2025-04-24

## 2025-04-21 RX ORDER — DEXTROSE 50 % IN WATER 50 %
12.5 SYRINGE (ML) INTRAVENOUS ONCE
Refills: 0 | Status: COMPLETED | OUTPATIENT
Start: 2025-04-21 | End: 2025-04-21

## 2025-04-21 RX ORDER — METHYLPREDNISOLONE ACETATE 80 MG/ML
8 INJECTION, SUSPENSION INTRA-ARTICULAR; INTRALESIONAL; INTRAMUSCULAR; SOFT TISSUE DAILY
Refills: 0 | Status: DISCONTINUED | OUTPATIENT
Start: 2025-04-22 | End: 2025-04-24

## 2025-04-21 RX ORDER — INSULIN GLARGINE-YFGN 100 [IU]/ML
10 INJECTION, SOLUTION SUBCUTANEOUS EVERY MORNING
Refills: 0 | Status: DISCONTINUED | OUTPATIENT
Start: 2025-04-21 | End: 2025-04-21

## 2025-04-21 RX ADMIN — TIOTROPIUM BROMIDE INHALATION SPRAY 2 PUFF(S): 3.12 SPRAY, METERED RESPIRATORY (INHALATION) at 13:26

## 2025-04-21 RX ADMIN — Medication 30 MILLILITER(S): at 16:23

## 2025-04-21 RX ADMIN — Medication 1 DOSE(S): at 17:31

## 2025-04-21 RX ADMIN — LABETALOL HYDROCHLORIDE 100 MILLIGRAM(S): 200 TABLET, FILM COATED ORAL at 06:31

## 2025-04-21 RX ADMIN — ROSUVASTATIN CALCIUM 5 MILLIGRAM(S): 20 TABLET, FILM COATED ORAL at 21:58

## 2025-04-21 RX ADMIN — MUPIROCIN CALCIUM 1 APPLICATION(S): 20 CREAM TOPICAL at 06:33

## 2025-04-21 RX ADMIN — Medication 12.5 GRAM(S): at 08:24

## 2025-04-21 RX ADMIN — MEXILETINE HYDROCHLORIDE 200 MILLIGRAM(S): 250 CAPSULE ORAL at 21:57

## 2025-04-21 RX ADMIN — Medication 4 MILLILITER(S): at 17:31

## 2025-04-21 RX ADMIN — LEVETIRACETAM 1000 MILLIGRAM(S): 10 INJECTION, SOLUTION INTRAVENOUS at 06:32

## 2025-04-21 RX ADMIN — Medication 500 MILLIGRAM(S): at 13:23

## 2025-04-21 RX ADMIN — GLYCOPYRROLATE 1 MILLIGRAM(S): 0.2 INJECTION INTRAMUSCULAR; INTRAVENOUS at 13:24

## 2025-04-21 RX ADMIN — Medication 1 DOSE(S): at 06:39

## 2025-04-21 RX ADMIN — Medication 2 TABLET(S): at 21:57

## 2025-04-21 RX ADMIN — INSULIN LISPRO 3: 100 INJECTION, SOLUTION INTRAVENOUS; SUBCUTANEOUS at 17:34

## 2025-04-21 RX ADMIN — GLYCOPYRROLATE 1 MILLIGRAM(S): 0.2 INJECTION INTRAMUSCULAR; INTRAVENOUS at 06:32

## 2025-04-21 RX ADMIN — BUDESONIDE 1 MILLIGRAM(S): 0.25 SUSPENSION RESPIRATORY (INHALATION) at 06:33

## 2025-04-21 RX ADMIN — FORMOTEROL FUMARATE DIHYDRATE 20 MICROGRAM(S): 20 SOLUTION RESPIRATORY (INHALATION) at 06:42

## 2025-04-21 RX ADMIN — POLYETHYLENE GLYCOL 3350 17 GRAM(S): 17 POWDER, FOR SOLUTION ORAL at 13:25

## 2025-04-21 RX ADMIN — FLUTICASONE PROPIONATE 1 SPRAY(S): 50 SPRAY, METERED NASAL at 06:31

## 2025-04-21 RX ADMIN — LACOSAMIDE 100 MILLIGRAM(S): 150 TABLET, FILM COATED ORAL at 06:31

## 2025-04-21 RX ADMIN — Medication 325 MILLIGRAM(S): at 13:24

## 2025-04-21 RX ADMIN — MEXILETINE HYDROCHLORIDE 200 MILLIGRAM(S): 250 CAPSULE ORAL at 06:32

## 2025-04-21 RX ADMIN — Medication 40 MILLIGRAM(S): at 06:32

## 2025-04-21 RX ADMIN — INSULIN GLARGINE-YFGN 10 UNIT(S): 100 INJECTION, SOLUTION SUBCUTANEOUS at 13:33

## 2025-04-21 RX ADMIN — INSULIN GLARGINE-YFGN 14 UNIT(S): 100 INJECTION, SOLUTION SUBCUTANEOUS at 22:06

## 2025-04-21 RX ADMIN — LABETALOL HYDROCHLORIDE 100 MILLIGRAM(S): 200 TABLET, FILM COATED ORAL at 13:24

## 2025-04-21 RX ADMIN — Medication 4 MILLILITER(S): at 06:31

## 2025-04-21 RX ADMIN — QUETIAPINE FUMARATE 12.5 MILLIGRAM(S): 25 TABLET ORAL at 21:58

## 2025-04-21 RX ADMIN — Medication 1 TABLET(S): at 13:24

## 2025-04-21 RX ADMIN — IPRATROPIUM BROMIDE AND ALBUTEROL SULFATE 3 MILLILITER(S): .5; 2.5 SOLUTION RESPIRATORY (INHALATION) at 13:25

## 2025-04-21 RX ADMIN — BUDESONIDE 1 MILLIGRAM(S): 0.25 SUSPENSION RESPIRATORY (INHALATION) at 17:32

## 2025-04-21 RX ADMIN — GLYCOPYRROLATE 1 MILLIGRAM(S): 0.2 INJECTION INTRAMUSCULAR; INTRAVENOUS at 21:57

## 2025-04-21 RX ADMIN — Medication 20 MILLIGRAM(S): at 13:24

## 2025-04-21 RX ADMIN — MEXILETINE HYDROCHLORIDE 200 MILLIGRAM(S): 250 CAPSULE ORAL at 13:24

## 2025-04-21 RX ADMIN — Medication 3 MILLIGRAM(S): at 22:09

## 2025-04-21 RX ADMIN — MUPIROCIN CALCIUM 1 APPLICATION(S): 20 CREAM TOPICAL at 17:32

## 2025-04-21 RX ADMIN — FORMOTEROL FUMARATE DIHYDRATE 20 MICROGRAM(S): 20 SOLUTION RESPIRATORY (INHALATION) at 17:33

## 2025-04-21 RX ADMIN — PREDNISONE 40 MILLIGRAM(S): 20 TABLET ORAL at 06:32

## 2025-04-21 RX ADMIN — IPRATROPIUM BROMIDE AND ALBUTEROL SULFATE 3 MILLILITER(S): .5; 2.5 SOLUTION RESPIRATORY (INHALATION) at 17:31

## 2025-04-21 RX ADMIN — IPRATROPIUM BROMIDE AND ALBUTEROL SULFATE 3 MILLILITER(S): .5; 2.5 SOLUTION RESPIRATORY (INHALATION) at 23:46

## 2025-04-21 RX ADMIN — LEVETIRACETAM 1000 MILLIGRAM(S): 10 INJECTION, SOLUTION INTRAVENOUS at 17:30

## 2025-04-21 RX ADMIN — Medication 60 MILLIGRAM(S): at 21:58

## 2025-04-21 RX ADMIN — IPRATROPIUM BROMIDE AND ALBUTEROL SULFATE 3 MILLILITER(S): .5; 2.5 SOLUTION RESPIRATORY (INHALATION) at 06:39

## 2025-04-21 RX ADMIN — MONTELUKAST SODIUM 10 MILLIGRAM(S): 10 TABLET ORAL at 21:57

## 2025-04-21 RX ADMIN — INSULIN LISPRO 2: 100 INJECTION, SOLUTION INTRAVENOUS; SUBCUTANEOUS at 13:27

## 2025-04-21 RX ADMIN — LABETALOL HYDROCHLORIDE 100 MILLIGRAM(S): 200 TABLET, FILM COATED ORAL at 21:58

## 2025-04-21 RX ADMIN — LACOSAMIDE 100 MILLIGRAM(S): 150 TABLET, FILM COATED ORAL at 17:30

## 2025-04-21 NOTE — PROGRESS NOTE ADULT - PROBLEM SELECTOR PLAN 4
- per pt, she has been on Ertapenem for TB like PNA, last dose should be today per daughter     Plan:   - ID c/s, appreciate recs - hold Ertapenem (can increase seizure threshold) and monitor off Abx   - check sputum Cx - moderate polymorphonuclear leukocytes, few squamous epithelial cells, rare yeast   - give benadryl 50 mg PO prior to Ertapenem  - Sputum Cx showed moderate polymorphonuclear leukocytes, few squamous epithelial cells, rare yeast   - pulm c/s, appreciate recs - per pt, she has been on Ertapenem for TB like PNA, last dose should be today per daughter     Plan:   - ID c/s, appreciate recs - hold Ertapenem (can increase seizure threshold) and monitor off Abx   - check sputum Cx - moderate polymorphonuclear leukocytes, few squamous epithelial cells, rare yeast   - give benadryl 50 mg PO prior to Ertapenem  - Sputum Cx showed moderate polymorphonuclear leukocytes, few squamous epithelial cells, rare yeast   - pulm c/s, appreciate recs - resume home dose methylprednisolone 8 mg qd

## 2025-04-21 NOTE — CONSULT NOTE ADULT - CONSULT REQUESTED DATE/TIME
18-Apr-2025
18-Apr-2025 11:38
20-Apr-2025 15:19
18-Apr-2025 14:49
20-Apr-2025 14:29
20-Apr-2025 14:52
21-Apr-2025 15:12

## 2025-04-21 NOTE — PROGRESS NOTE ADULT - SUBJECTIVE AND OBJECTIVE BOX
CHIEF COMPLAINT: Hallucinations    Interval Events: Still with hallucinations but has insight. Picking off and throwing away small lizards during [place]. Fell over weekend. Improved breathing    REVIEW OF SYSTEMS:  Constitutional: [ ] negative [ ] fevers [ ] chills [ ] weight loss [ ] weight gain  HEENT: [ ] negative [ ] dry eyes [ ] eye irritation [ ] postnasal drip [ ] nasal congestion  CV: [ ] negative  [ ] chest pain [ ] orthopnea [ ] palpitations [ ] murmur  Resp: [ ] negative [ ] cough [X] shortness of breath [ ] dyspnea [ ] wheezing [X] sputum [ ] hemoptysis  GI: [ ] negative [ ] nausea [ ] vomiting [ ] diarrhea [ ] constipation [ ] abd pain [ ] dysphagia   : [ ] negative [ ] dysuria [ ] nocturia [ ] hematuria [ ] increased urinary frequency  Musculoskeletal: [ ] negative [ ] back pain [ ] myalgias [ ] arthralgias [ ] fracture  Skin: [ ] negative [ ] rash [ ] itch  Neurological: [ ] negative [ ] headache [ ] dizziness [ ] syncope [ ] weakness [ ] numbness  Psychiatric: [ ] negative [ ] anxiety [ ] depression  Endocrine: [ ] negative [ ] diabetes [ ] thyroid problem  Hematologic/Lymphatic: [ ] negative [ ] anemia [ ] bleeding problem  Allergic/Immunologic: [ ] negative [ ] itchy eyes [ ] nasal discharge [ ] hives [ ] angioedema  [X] All other systems negative  [ ] Unable to assess ROS because ________    OBJECTIVE:  ICU Vital Signs Last 24 Hrs  T(C): 36.2 (21 Apr 2025 06:31), Max: 36.7 (20 Apr 2025 22:02)  T(F): 97.2 (21 Apr 2025 06:31), Max: 98 (20 Apr 2025 22:02)  HR: 68 (21 Apr 2025 09:09) (60 - 84)  BP: 110/58 (21 Apr 2025 06:31) (110/58 - 160/82)  BP(mean): --  ABP: --  ABP(mean): --  RR: 18 (21 Apr 2025 06:31) (16 - 18)  SpO2: 97% (21 Apr 2025 09:09) (95% - 98%)    O2 Parameters below as of 21 Apr 2025 06:31  Patient On (Oxygen Delivery Method): room air              04-20 @ 07:01  -  04-21 @ 07:00  --------------------------------------------------------  IN: 240 mL / OUT: 750 mL / NET: -510 mL      CAPILLARY BLOOD GLUCOSE      POCT Blood Glucose.: 192 mg/dL (21 Apr 2025 08:51)      PHYSICAL EXAM:  General: NAD, resting comfortably  HEENT: EOMI, PERRLA. Exophthalomos  Neck: Supple  Respiratory: Scattered wheeze  Cardiovascular: S1S2   Abdomen: Soft, NTND, BS+  Extremities: No edema  Skin: Warm and dry  Neurological: Currently [address] MEDICATIONS:  MEDICATIONS  (STANDING):  albuterol/ipratropium for Nebulization 3 milliLiter(s) Nebulizer every 6 hours  ascorbic acid 500 milliGRAM(s) Oral daily  buDESOnide    Inhalation Suspension 1 milliGRAM(s) Inhalation every 12 hours  chlorhexidine 2% Cloths 1 Application(s) Topical daily  dextrose 5%. 1000 milliLiter(s) (50 mL/Hr) IV Continuous <Continuous>  dextrose 5%. 1000 milliLiter(s) (100 mL/Hr) IV Continuous <Continuous>  dextrose 50% Injectable 25 Gram(s) IV Push once  dextrose 50% Injectable 12.5 Gram(s) IV Push once  dextrose 50% Injectable 25 Gram(s) IV Push once  ferrous    sulfate 325 milliGRAM(s) Oral daily  fluticasone propionate 50 MICROgram(s)/spray Nasal Spray 1 Spray(s) Both Nostrils two times a day  fluticasone propionate/ salmeterol 250-50 MICROgram(s) Diskus 1 Dose(s) Inhalation two times a day  formoterol for nebulization 20 MICROGram(s) Nebulizer two times a day  glucagon  Injectable 1 milliGRAM(s) IntraMuscular once  glycopyrrolate 1 milliGRAM(s) Oral three times a day  insulin glargine Injectable (LANTUS) 10 Unit(s) SubCutaneous every morning  insulin glargine Injectable (LANTUS) 10 Unit(s) SubCutaneous at bedtime  insulin lispro (ADMELOG) corrective regimen sliding scale   SubCutaneous three times a day before meals  insulin lispro (ADMELOG) corrective regimen sliding scale   SubCutaneous at bedtime  labetalol 100 milliGRAM(s) Oral every 8 hours  lacosamide 100 milliGRAM(s) Oral two times a day  levETIRAcetam 1000 milliGRAM(s) Oral two times a day  megestrol 20 milliGRAM(s) Oral daily  mexiletine 200 milliGRAM(s) Oral three times a day  mineral oil 30 milliLiter(s) Oral daily  montelukast 10 milliGRAM(s) Oral at bedtime  multivitamin 1 Tablet(s) Oral daily  mupirocin 2% Nasal 1 Application(s) Both Nostrils two times a day  NIFEdipine XL 60 milliGRAM(s) Oral daily  Ohtuvayre [Ensifentrine] 3mG/2.5mL Nebul 3 milliGRAM(s) 3 milliGRAM(s) Nebulizer two times a day  pantoprazole    Tablet 40 milliGRAM(s) Oral before breakfast  polyethylene glycol 3350 17 Gram(s) Oral daily  predniSONE   Tablet 40 milliGRAM(s) Oral daily  QUEtiapine 12.5 milliGRAM(s) Oral at bedtime  rosuvastatin 5 milliGRAM(s) Oral at bedtime  senna 2 Tablet(s) Oral at bedtime  sodium chloride 7% Inhalation 4 milliLiter(s) Inhalation every 12 hours  tiotropium 2.5 MICROgram(s) Inhaler 2 Puff(s) Inhalation daily    MEDICATIONS  (PRN):  dextrose Oral Gel 15 Gram(s) Oral once PRN Blood Glucose LESS THAN 70 milliGRAM(s)/deciliter  dextrose Oral Gel 15 Gram(s) Oral once PRN Blood Glucose LESS THAN 70 milliGRAM(s)/deciliter  magnesium citrate Oral Solution 296 milliLiter(s) Oral every 24 hours PRN Constipation  sertraline 50 milliGRAM(s) Oral daily PRN for anxiety      LABS:                        12.6   10.59 )-----------( 283      ( 21 Apr 2025 07:30 )             40.3     Hgb Trend: 12.6<--, 12.1<--, 12.3<--, 10.9<--, 11.0<--  04-21    144  |  106  |  16  ----------------------------<  48[LL]  3.9   |  24  |  0.77    Ca    9.3      21 Apr 2025 07:28  Phos  2.7     04-21  Mg     2.3     04-21    TPro  6.8  /  Alb  3.8  /  TBili  0.4  /  DBili  x   /  AST  24  /  ALT  37  /  AlkPhos  87  04-21    Creatinine Trend: 0.77<--, 0.46<--, 0.78<--, 0.58<--, 0.67<--    Urinalysis Basic - ( 21 Apr 2025 07:28 )    Color: x / Appearance: x / SG: x / pH: x  Gluc: 48 mg/dL / Ketone: x  / Bili: x / Urobili: x   Blood: x / Protein: x / Nitrite: x   Leuk Esterase: x / RBC: x / WBC x   Sq Epi: x / Non Sq Epi: x / Bacteria: x        Venous Blood Gas:  04-21 @ 07:31  7.39/47/44/28/73.2  VBG Lactate: 2.0  Venous Blood Gas:  04-20 @ 01:42  7.46/40/53/28/84.5  VBG Lactate: 1.4      MICROBIOLOGY:       RADIOLOGY:  [ ] Reviewed and interpreted by me    PULMONARY FUNCTION TESTS:    EKG:

## 2025-04-21 NOTE — PROGRESS NOTE ADULT - ATTENDING COMMENTS
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 unable to tell EOM OS. complaining of mucus OS. can start AT and ointment as stated in note. periorbital ecchymosis. can do ice packs.  Outpatient follow up. OD DFE wnl.

## 2025-04-21 NOTE — BH CONSULTATION LIAISON PROGRESS NOTE - NSBHCHARTREVIEWLAB_PSY_A_CORE FT
04-21    144  |  106  |  16  ----------------------------<  48[LL]  3.9   |  24  |  0.77    Ca    9.3      21 Apr 2025 07:28  Phos  2.7     04-21  Mg     2.3     04-21    TPro  6.8  /  Alb  3.8  /  TBili  0.4  /  DBili  x   /  AST  24  /  ALT  37  /  AlkPhos  87  04-21                          12.6   10.59 )-----------( 283      ( 21 Apr 2025 07:30 )             40.3   
                      12.1   11.19 )-----------( 321      ( 20 Apr 2025 07:23 )             39.8     04-20    144  |  108  |  13  ----------------------------<  44[LL]  3.7   |  21[L]  |  0.46[L]    Ca    9.4      20 Apr 2025 07:21  Phos  2.6     04-20  Mg     2.4     04-20    TPro  7.0  /  Alb  3.8  /  TBili  0.3  /  DBili  x   /  AST  23  /  ALT  34  /  AlkPhos  92  04-20    Urinalysis Basic - ( 20 Apr 2025 07:21 )    Color: x / Appearance: x / SG: x / pH: x  Gluc: 44 mg/dL / Ketone: x  / Bili: x / Urobili: x   Blood: x / Protein: x / Nitrite: x   Leuk Esterase: x / RBC: x / WBC x   Sq Epi: x / Non Sq Epi: x / Bacteria: x

## 2025-04-21 NOTE — CHART NOTE - NSCHARTNOTEFT_GEN_A_CORE
Okay to restart AC stepwise fashion (eliquis, then plavix) from PRS standpoint. Monitor for ss bleeding.    Kelli Porter MD  Plastic and Reconstructive Surgery, PGY-2  CASIE: [id number]  NS: [phone]

## 2025-04-21 NOTE — BH CONSULTATION LIAISON PROGRESS NOTE - NSBHFUPINTERVALHXFT_PSY_A_CORE
Patient seen and assessed at bedside w/daughter present. Patient is coherent however difficult to follow. She reports that she saw some toads and fish this morning and in an attempt to leave, had a fall. At time of interview, she is AOx1 to self. Believes she is at a government facility to accompany her daughter. Knows the year but not the date. Attention and concentration intact. Patient later reevaluated w/attending physician present, is AOx2 to self and place. Per primary team, patient was hypoglycemic this AM though was administered D50 prior to the fall. 
Pt started on Seroquel yesterday. Took prn Zoloft yesterday. No behavioral complaints noted in chart. Episodes of hypoglycemia noted.    Pt seen at bedside. No complaints. Reports slept well. States that she continues to occasionally have hallucinations but she knows they aren't real. Denies seeing any animals or strange people recently. States that she felt something earlier today but ignored it. Reports daughter came to visit yesterday. Feeling weak because of the fall. Denies SI/HI/AVH.

## 2025-04-21 NOTE — PROGRESS NOTE ADULT - PROBLEM SELECTOR PLAN 3
- 4/19 noted new T wave inversion most prominently in V2 (not seen on 4/18 EKG)   - repeat EKG still showed T2 inversion in V2,    - pt currently asymtomatic and VSS     Plan:  - check troponin - 33  - check CKMB - 3   - trend Troponin and CKMB again in 4 hours   - cards c/s, appreciate recs   - check TTE - 4/19 noted new T wave inversion most prominently in V2 (not seen on 4/18 EKG)   - repeat EKG still showed T2 inversion in V2,    - pt currently asymtomatic and VSS     Plan:  - check troponin - 33 -> 23   - check CKMB - 3 -> 2.6   - cards c/s, appreciate recs

## 2025-04-21 NOTE — PROGRESS NOTE ADULT - PROBLEM SELECTOR PLAN 2
- tachpechnic to RR of 30s, proBNP 3648 (baseline 1896 in Feb 2025)  -  CXR showed trace R pleural effusion.   - Received solumedrol 40 mg IV, duoneb x 3 in ED    Plan:   - c/w duoneb Q6 standing, Advair BID, Spiriva qd   - c/w budesonide 1mg Q12, montelukast 10 mg qd   - Pulm c/s, appreciate recs   - switched from methylprednisolone 60 IV qd to prednisone 40 mg qd per pulm   - full RVP - neg   - pt uses CPAP and VESE at home (pt's daughter brought them in from home)   - chest PT, hypertonic saline   -   - PT/OT OOB to chair as much as possible   - advised pt's daughter to bring in Performist BID and Ohtuvayre BID from home as pharmacy don't carry it here - tachpechnic to RR of 30s, proBNP 3648 (baseline 1896 in Feb 2025)  -  CXR showed trace R pleural effusion.   - Received solumedrol 40 mg IV, duoneb x 3 in ED    Plan:   - c/w duoneb Q6 standing, Advair BID, Spiriva qd   - c/w budesonide 1mg Q12, montelukast 10 mg qd   - Pulm c/s, appreciate recs   - full RVP - neg   - pt uses CPAP and VESE at home (pt's daughter brought them in from home)   - chest PT, hypertonic saline   -   - PT/OT OOB to chair as much as possible   - advised pt's daughter to bring in Performist BID and Ohtuvayre BID from home as pharmacy don't carry it here  - 4/21 switched from prednisone 40 mg to methyprednisone 8 mg qd per pulm (s/p IV methylprednisolone 40 and 60)

## 2025-04-21 NOTE — PROGRESS NOTE ADULT - ATTENDING COMMENTS
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 Antonio, Type A dissection s/p bioAVR with Bental procedure (9/2022), severe AS s/p TAVR (9/10/2023), TIA's, DVT, Afib on Eliquis,  Colon cancer S/P resection, colostomy bag, neuropathy, Midline for IV ABX placed 2 weeks ago for chronic "TB like" pneumonia on ertapenmen a/w worsening shortness of breath over the last 7 days. multiple falls, urinary burning, visual hallucination, admitted for further management of asthma/COPD exacerbation, and further eval of new onset visual hallucination. 4/17 Noted new T wave inversion most prominently in V2, troponin/CKMB neg. On 4/20, pt had a fall resulting in L periorbital hematoma with comminuted fracture of the left medial orbital wall.     Now stable. continue to monitor on enhanced supervision. Episodes of hypoglycemia- Endocrinology to follow for insulin adjustment.

## 2025-04-21 NOTE — PROGRESS NOTE ADULT - PROBLEM SELECTOR PLAN 10
Plan:   - c/w labetalol 100 TID   - c/w nifedipine 30 mg qd Plan:   - c/w labetalol 100 TID   - increased nifedipine 30 mg qd to 60 mg qd for stricter BP control i.s.o dissection, SBP goal <140

## 2025-04-21 NOTE — PROGRESS NOTE ADULT - SUBJECTIVE AND OBJECTIVE BOX
*******************************  Hien Chavez, PGY-1  Internal Medicine  Contact via [employer] TEAMS    *******************************    PROGRESS NOTE:     Patient is a 74y old  Female who presents with a chief complaint of visual hallucination, multiple falls, SOB (21 Apr 2025 06:55)      INTERVAL EVENTS: No acute overnight events.     SUBJECTIVE: Patient seen and examined at bedside. This morning, the patient is comfortable and doing well. No acute complaints. Denies fevers, chills, N/V/D, chest pain, SOB, abdominal pain.    MEDICATIONS  (STANDING):  albuterol/ipratropium for Nebulization 3 milliLiter(s) Nebulizer every 6 hours  ascorbic acid 500 milliGRAM(s) Oral daily  buDESOnide    Inhalation Suspension 1 milliGRAM(s) Inhalation every 12 hours  chlorhexidine 2% Cloths 1 Application(s) Topical daily  dextrose 5%. 1000 milliLiter(s) (50 mL/Hr) IV Continuous <Continuous>  dextrose 5%. 1000 milliLiter(s) (100 mL/Hr) IV Continuous <Continuous>  dextrose 50% Injectable 25 Gram(s) IV Push once  dextrose 50% Injectable 12.5 Gram(s) IV Push once  dextrose 50% Injectable 25 Gram(s) IV Push once  ferrous    sulfate 325 milliGRAM(s) Oral daily  fluticasone propionate 50 MICROgram(s)/spray Nasal Spray 1 Spray(s) Both Nostrils two times a day  fluticasone propionate/ salmeterol 250-50 MICROgram(s) Diskus 1 Dose(s) Inhalation two times a day  formoterol for nebulization 20 MICROGram(s) Nebulizer two times a day  glucagon  Injectable 1 milliGRAM(s) IntraMuscular once  glycopyrrolate 1 milliGRAM(s) Oral three times a day  insulin glargine Injectable (LANTUS) 14 Unit(s) SubCutaneous every morning  insulin glargine Injectable (LANTUS) 14 Unit(s) SubCutaneous at bedtime  insulin lispro (ADMELOG) corrective regimen sliding scale   SubCutaneous three times a day before meals  insulin lispro (ADMELOG) corrective regimen sliding scale   SubCutaneous at bedtime  labetalol 100 milliGRAM(s) Oral every 8 hours  lacosamide 100 milliGRAM(s) Oral two times a day  levETIRAcetam 1000 milliGRAM(s) Oral two times a day  megestrol 20 milliGRAM(s) Oral daily  mexiletine 200 milliGRAM(s) Oral three times a day  mineral oil 30 milliLiter(s) Oral daily  montelukast 10 milliGRAM(s) Oral at bedtime  multivitamin 1 Tablet(s) Oral daily  mupirocin 2% Nasal 1 Application(s) Both Nostrils two times a day  NIFEdipine XL 60 milliGRAM(s) Oral daily  Ohtuvayre [Ensifentrine] 3mG/2.5mL Nebul 3 milliGRAM(s) 3 milliGRAM(s) Nebulizer two times a day  pantoprazole    Tablet 40 milliGRAM(s) Oral before breakfast  polyethylene glycol 3350 17 Gram(s) Oral daily  predniSONE   Tablet 40 milliGRAM(s) Oral daily  QUEtiapine 12.5 milliGRAM(s) Oral at bedtime  rosuvastatin 5 milliGRAM(s) Oral at bedtime  senna 2 Tablet(s) Oral at bedtime  sodium chloride 7% Inhalation 4 milliLiter(s) Inhalation every 12 hours  tiotropium 2.5 MICROgram(s) Inhaler 2 Puff(s) Inhalation daily    MEDICATIONS  (PRN):  dextrose Oral Gel 15 Gram(s) Oral once PRN Blood Glucose LESS THAN 70 milliGRAM(s)/deciliter  dextrose Oral Gel 15 Gram(s) Oral once PRN Blood Glucose LESS THAN 70 milliGRAM(s)/deciliter  magnesium citrate Oral Solution 296 milliLiter(s) Oral every 24 hours PRN Constipation  sertraline 50 milliGRAM(s) Oral daily PRN for anxiety      CAPILLARY BLOOD GLUCOSE      POCT Blood Glucose.: 70 mg/dL (21 Apr 2025 06:17)  POCT Blood Glucose.: 74 mg/dL (20 Apr 2025 22:05)  POCT Blood Glucose.: 92 mg/dL (20 Apr 2025 18:48)  POCT Blood Glucose.: 252 mg/dL (20 Apr 2025 14:18)  POCT Blood Glucose.: 91 mg/dL (20 Apr 2025 09:32)  POCT Blood Glucose.: 92 mg/dL (20 Apr 2025 08:47)  POCT Blood Glucose.: 156 mg/dL (20 Apr 2025 08:03)    I&O's Summary    20 Apr 2025 07:01  -  21 Apr 2025 07:00  --------------------------------------------------------  IN: 240 mL / OUT: 750 mL / NET: -510 mL        PHYSICAL EXAM:  Vital Signs Last 24 Hrs  T(C): 36.2 (21 Apr 2025 06:31), Max: 36.9 (20 Apr 2025 12:13)  T(F): 97.2 (21 Apr 2025 06:31), Max: 98.4 (20 Apr 2025 12:13)  HR: 69 (21 Apr 2025 06:31) (60 - 84)  BP: 110/58 (21 Apr 2025 06:31) (110/58 - 160/82)  BP(mean): --  RR: 18 (21 Apr 2025 06:31) (16 - 18)  SpO2: 98% (21 Apr 2025 06:31) (94% - 98%)    Parameters below as of 21 Apr 2025 06:31  Patient On (Oxygen Delivery Method): room air        GENERAL: NAD, lying in bed comfortably  HEAD: Atraumatic, normocephalic  EYES: EOMI, PERRLA, conjunctiva and sclera clear  ENT: Moist mucous membranes  NECK: Supple, no JVD  HEART: S1, S2, Regular rate and rhythm, no murmurs, rubs, or gallops  LUNGS: Unlabored respirations, clear to auscultation bilaterally, no crackles, wheezing, or rhonchi  ABDOMEN: Soft, nontender, nondistended, +BS  EXTREMITIES: 2+ peripheral pulses bilaterally. No clubbing, cyanosis, or edema  NERVOUS SYSTEM:  A&Ox3, no focal deficits   SKIN: No rashes or lesions    LABS:                        12.1   11.19 )-----------( 321      ( 20 Apr 2025 07:23 )             39.8     04-20    144  |  108  |  13  ----------------------------<  44[LL]  3.7   |  21[L]  |  0.46[L]    Ca    9.4      20 Apr 2025 07:21  Phos  2.6     04-20  Mg     2.4     04-20    TPro  7.0  /  Alb  3.8  /  TBili  0.3  /  DBili  x   /  AST  23  /  ALT  34  /  AlkPhos  92  04-20      CARDIAC MARKERS ( 19 Apr 2025 16:40 )  x     / x     / x     / x     / 2.6 ng/mL  CARDIAC MARKERS ( 19 Apr 2025 11:13 )  x     / x     / x     / x     / 3.0 ng/mL      Urinalysis Basic - ( 20 Apr 2025 07:21 )    Color: x / Appearance: x / SG: x / pH: x  Gluc: 44 mg/dL / Ketone: x  / Bili: x / Urobili: x   Blood: x / Protein: x / Nitrite: x   Leuk Esterase: x / RBC: x / WBC x   Sq Epi: x / Non Sq Epi: x / Bacteria: x        Culture - Sputum (collected 18 Apr 2025 11:25)  Source: Sputum Sputum  Gram Stain (18 Apr 2025 20:25):    Moderate polymorphonuclear leukocytes per low power field    Few Squamous epithelial cells per low power field    Rare Yeast like cells per oil power field  Final Report (20 Apr 2025 07:24):    Commensal val consistent with body site        RADIOLOGY & ADDITIONAL TESTS:  Results Reviewed:   Imaging Personally Reviewed:  Electrocardiogram Personally Reviewed:  Tele: *******************************  Hien Chavez, PGY-1  Internal Medicine  Contact via [employer] TEAMS    *******************************    PROGRESS NOTE:     Patient is a 74y old  Female who presents with a chief complaint of visual hallucination, multiple falls, SOB (21 Apr 2025 06:55)      INTERVAL EVENTS: Pt had a fall yesterday with CT showing Left periorbital hematoma with a comminuted fracture of the left medial orbital wall and resultant hemorrhagic air-fluid levels in the left ethmoid sinus, extending into the left sphenoid sinus. There is herniation of the orbital fat and small parts of the left medial rectus and inferior rectus muscles that extends into the defect.     SUBJECTIVE: Patient seen and examined at bedside. This morning, the patient reports improvement in hallucination. She remembers exactly what happened leading to fall yesterday, states she was having visual hallucination (seeing toads) and fall after she tries to jump over the rail. Reports some pain at the hip but does not feel like she needs medication for pain control. Denies fevers, chills, N/V/D, chest pain, SOB, abdominal pain.    MEDICATIONS  (STANDING):  albuterol/ipratropium for Nebulization 3 milliLiter(s) Nebulizer every 6 hours  ascorbic acid 500 milliGRAM(s) Oral daily  buDESOnide    Inhalation Suspension 1 milliGRAM(s) Inhalation every 12 hours  chlorhexidine 2% Cloths 1 Application(s) Topical daily  dextrose 5%. 1000 milliLiter(s) (50 mL/Hr) IV Continuous <Continuous>  dextrose 5%. 1000 milliLiter(s) (100 mL/Hr) IV Continuous <Continuous>  dextrose 50% Injectable 25 Gram(s) IV Push once  dextrose 50% Injectable 12.5 Gram(s) IV Push once  dextrose 50% Injectable 25 Gram(s) IV Push once  ferrous    sulfate 325 milliGRAM(s) Oral daily  fluticasone propionate 50 MICROgram(s)/spray Nasal Spray 1 Spray(s) Both Nostrils two times a day  fluticasone propionate/ salmeterol 250-50 MICROgram(s) Diskus 1 Dose(s) Inhalation two times a day  formoterol for nebulization 20 MICROGram(s) Nebulizer two times a day  glucagon  Injectable 1 milliGRAM(s) IntraMuscular once  glycopyrrolate 1 milliGRAM(s) Oral three times a day  insulin glargine Injectable (LANTUS) 14 Unit(s) SubCutaneous every morning  insulin glargine Injectable (LANTUS) 14 Unit(s) SubCutaneous at bedtime  insulin lispro (ADMELOG) corrective regimen sliding scale   SubCutaneous three times a day before meals  insulin lispro (ADMELOG) corrective regimen sliding scale   SubCutaneous at bedtime  labetalol 100 milliGRAM(s) Oral every 8 hours  lacosamide 100 milliGRAM(s) Oral two times a day  levETIRAcetam 1000 milliGRAM(s) Oral two times a day  megestrol 20 milliGRAM(s) Oral daily  mexiletine 200 milliGRAM(s) Oral three times a day  mineral oil 30 milliLiter(s) Oral daily  montelukast 10 milliGRAM(s) Oral at bedtime  multivitamin 1 Tablet(s) Oral daily  mupirocin 2% Nasal 1 Application(s) Both Nostrils two times a day  NIFEdipine XL 60 milliGRAM(s) Oral daily  Ohtuvayre [Ensifentrine] 3mG/2.5mL Nebul 3 milliGRAM(s) 3 milliGRAM(s) Nebulizer two times a day  pantoprazole    Tablet 40 milliGRAM(s) Oral before breakfast  polyethylene glycol 3350 17 Gram(s) Oral daily  predniSONE   Tablet 40 milliGRAM(s) Oral daily  QUEtiapine 12.5 milliGRAM(s) Oral at bedtime  rosuvastatin 5 milliGRAM(s) Oral at bedtime  senna 2 Tablet(s) Oral at bedtime  sodium chloride 7% Inhalation 4 milliLiter(s) Inhalation every 12 hours  tiotropium 2.5 MICROgram(s) Inhaler 2 Puff(s) Inhalation daily    MEDICATIONS  (PRN):  dextrose Oral Gel 15 Gram(s) Oral once PRN Blood Glucose LESS THAN 70 milliGRAM(s)/deciliter  dextrose Oral Gel 15 Gram(s) Oral once PRN Blood Glucose LESS THAN 70 milliGRAM(s)/deciliter  magnesium citrate Oral Solution 296 milliLiter(s) Oral every 24 hours PRN Constipation  sertraline 50 milliGRAM(s) Oral daily PRN for anxiety      CAPILLARY BLOOD GLUCOSE      POCT Blood Glucose.: 70 mg/dL (21 Apr 2025 06:17)  POCT Blood Glucose.: 74 mg/dL (20 Apr 2025 22:05)  POCT Blood Glucose.: 92 mg/dL (20 Apr 2025 18:48)  POCT Blood Glucose.: 252 mg/dL (20 Apr 2025 14:18)  POCT Blood Glucose.: 91 mg/dL (20 Apr 2025 09:32)  POCT Blood Glucose.: 92 mg/dL (20 Apr 2025 08:47)  POCT Blood Glucose.: 156 mg/dL (20 Apr 2025 08:03)    I&O's Summary    20 Apr 2025 07:01  -  21 Apr 2025 07:00  --------------------------------------------------------  IN: 240 mL / OUT: 750 mL / NET: -510 mL        PHYSICAL EXAM:  Vital Signs Last 24 Hrs  T(C): 36.2 (21 Apr 2025 06:31), Max: 36.9 (20 Apr 2025 12:13)  T(F): 97.2 (21 Apr 2025 06:31), Max: 98.4 (20 Apr 2025 12:13)  HR: 69 (21 Apr 2025 06:31) (60 - 84)  BP: 110/58 (21 Apr 2025 06:31) (110/58 - 160/82)  BP(mean): --  RR: 18 (21 Apr 2025 06:31) (16 - 18)  SpO2: 98% (21 Apr 2025 06:31) (94% - 98%)    Parameters below as of 21 Apr 2025 06:31  Patient On (Oxygen Delivery Method): room air        GENERAL: NAD  HEAD: timi-orbital bruises noted in the L eye   EYES: L prosthetic eye   HEART: S1, S2, Regular rate and rhythm, no murmurs, rubs, or gallops  LUNGS: Unlabored respirations, clear to auscultation bilaterally  ABDOMEN: Soft, nontender, nondistended  EXTREMITIES: + TTP in b/l LE, no pitting edema   NERVOUS SYSTEM:  A&Ox3    LABS:                        12.1   11.19 )-----------( 321      ( 20 Apr 2025 07:23 )             39.8     04-20    144  |  108  |  13  ----------------------------<  44[LL]  3.7   |  21[L]  |  0.46[L]    Ca    9.4      20 Apr 2025 07:21  Phos  2.6     04-20  Mg     2.4     04-20    TPro  7.0  /  Alb  3.8  /  TBili  0.3  /  DBili  x   /  AST  23  /  ALT  34  /  AlkPhos  92  04-20      CARDIAC MARKERS ( 19 Apr 2025 16:40 )  x     / x     / x     / x     / 2.6 ng/mL  CARDIAC MARKERS ( 19 Apr 2025 11:13 )  x     / x     / x     / x     / 3.0 ng/mL      Urinalysis Basic - ( 20 Apr 2025 07:21 )    Color: x / Appearance: x / SG: x / pH: x  Gluc: 44 mg/dL / Ketone: x  / Bili: x / Urobili: x   Blood: x / Protein: x / Nitrite: x   Leuk Esterase: x / RBC: x / WBC x   Sq Epi: x / Non Sq Epi: x / Bacteria: x        Culture - Sputum (collected 18 Apr 2025 11:25)  Source: Sputum Sputum  Gram Stain (18 Apr 2025 20:25):    Moderate polymorphonuclear leukocytes per low power field    Few Squamous epithelial cells per low power field    Rare Yeast like cells per oil power field  Final Report (20 Apr 2025 07:24):    Commensal val consistent with body site        RADIOLOGY & ADDITIONAL TESTS:  Results Reviewed:   Imaging Personally Reviewed:  Electrocardiogram Personally Reviewed:  Tele:

## 2025-04-21 NOTE — PROGRESS NOTE ADULT - PROBLEM SELECTOR PLAN 12
Normal rate, regular rhythm, normal S1, S2 heart sounds heard. - severe AS s/p TAVR (9/10/2023), Type A dissection s/p bioAVR with Bental procedure (9/2022)    Plan:   - CTM   - c/w plavix - severe AS s/p TAVR (9/10/2023), Type A dissection s/p bioAVR with Bental procedure (9/2022)    Plan:   - CTM   - hold plavix i.s.o fall 4/20, resume once cleared by plastic and ophthalmology

## 2025-04-21 NOTE — PROGRESS NOTE ADULT - PROBLEM SELECTOR PLAN 8
Plan:   - c/w eliquis 5 mg BID Plan:   - hold eliquis 5 mg BID i.s.o fall on 4/20   - will resume eliquis 5mg BID once cleared by plastic and ophthalmology

## 2025-04-21 NOTE — PROGRESS NOTE ADULT - SUBJECTIVE AND OBJECTIVE BOX
[facility] - Le Jung, Bev, Jim, Mercedez, Sridhar Bertrand  Office Number:  [phone]    Patient resting comfortably in bed in NAD.  Laying flat with no respiratory distress.  No complaints of chest pain, dyspnea, palpitations, PND, or orthopnea.  She is changing her colostomy bag at the time of my evaluation with the nursing staff.  No overnight events.     F/U for:  Abnormal EKG    MEDICATIONS  (STANDING):  albuterol/ipratropium for Nebulization 3 milliLiter(s) Nebulizer every 6 hours  ascorbic acid 500 milliGRAM(s) Oral daily  buDESOnide    Inhalation Suspension 1 milliGRAM(s) Inhalation every 12 hours  chlorhexidine 2% Cloths 1 Application(s) Topical daily  dextrose 5%. 1000 milliLiter(s) (50 mL/Hr) IV Continuous <Continuous>  dextrose 5%. 1000 milliLiter(s) (100 mL/Hr) IV Continuous <Continuous>  dextrose 50% Injectable 25 Gram(s) IV Push once  dextrose 50% Injectable 12.5 Gram(s) IV Push once  dextrose 50% Injectable 25 Gram(s) IV Push once  ferrous    sulfate 325 milliGRAM(s) Oral daily  fluticasone propionate 50 MICROgram(s)/spray Nasal Spray 1 Spray(s) Both Nostrils two times a day  fluticasone propionate/ salmeterol 250-50 MICROgram(s) Diskus 1 Dose(s) Inhalation two times a day  formoterol for nebulization 20 MICROGram(s) Nebulizer two times a day  glucagon  Injectable 1 milliGRAM(s) IntraMuscular once  glycopyrrolate 1 milliGRAM(s) Oral three times a day  insulin glargine Injectable (LANTUS) 14 Unit(s) SubCutaneous every morning  insulin glargine Injectable (LANTUS) 14 Unit(s) SubCutaneous at bedtime  insulin lispro (ADMELOG) corrective regimen sliding scale   SubCutaneous three times a day before meals  insulin lispro (ADMELOG) corrective regimen sliding scale   SubCutaneous at bedtime  labetalol 100 milliGRAM(s) Oral every 8 hours  lacosamide 100 milliGRAM(s) Oral two times a day  levETIRAcetam 1000 milliGRAM(s) Oral two times a day  megestrol 20 milliGRAM(s) Oral daily  mexiletine 200 milliGRAM(s) Oral three times a day  mineral oil 30 milliLiter(s) Oral daily  montelukast 10 milliGRAM(s) Oral at bedtime  multivitamin 1 Tablet(s) Oral daily  mupirocin 2% Nasal 1 Application(s) Both Nostrils two times a day  NIFEdipine XL 60 milliGRAM(s) Oral daily  Ohtuvayre [Ensifentrine] 3mG/2.5mL Nebul 3 milliGRAM(s) 3 milliGRAM(s) Nebulizer two times a day  pantoprazole    Tablet 40 milliGRAM(s) Oral before breakfast  polyethylene glycol 3350 17 Gram(s) Oral daily  predniSONE   Tablet 40 milliGRAM(s) Oral daily  QUEtiapine 12.5 milliGRAM(s) Oral at bedtime  rosuvastatin 5 milliGRAM(s) Oral at bedtime  senna 2 Tablet(s) Oral at bedtime  sodium chloride 7% Inhalation 4 milliLiter(s) Inhalation every 12 hours  tiotropium 2.5 MICROgram(s) Inhaler 2 Puff(s) Inhalation daily    MEDICATIONS  (PRN):  dextrose Oral Gel 15 Gram(s) Oral once PRN Blood Glucose LESS THAN 70 milliGRAM(s)/deciliter  dextrose Oral Gel 15 Gram(s) Oral once PRN Blood Glucose LESS THAN 70 milliGRAM(s)/deciliter  magnesium citrate Oral Solution 296 milliLiter(s) Oral every 24 hours PRN Constipation  sertraline 50 milliGRAM(s) Oral daily PRN for anxiety      Allergies    aspirin (Unknown)  codeine (Unknown)  codeine (Short breath)  Valium (Unknown)  cefepime (Short breath (Severe))  shellfish (Anaphylaxis)  penicillin (Anaphylaxis)  cefepime (Anaphylaxis)  penicillin (Unknown)  Percocet (Unknown)  Avelox (Short breath; Pruritus)  Dilaudid (Short breath)  ampicillin (Unknown)  Valium (Short breath)  OxyContin (Unknown)  aspirin (Short breath)  iodine (Short breath; Swelling)  tetanus toxoid (Short breath)  Avelox (Unknown)  meropenem (Unknown)    Intolerances        Vital Signs Last 24 Hrs  T(C): 36.7 (20 Apr 2025 22:02), Max: 36.9 (20 Apr 2025 12:13)  T(F): 98 (20 Apr 2025 22:02), Max: 98.4 (20 Apr 2025 12:13)  HR: 69 (21 Apr 2025 06:31) (60 - 84)  BP: 110/58 (21 Apr 2025 06:31) (110/58 - 160/82)  BP(mean): --  RR: 18 (21 Apr 2025 06:31) (16 - 18)  SpO2: 98% (21 Apr 2025 06:31) (94% - 98%)    Parameters below as of 21 Apr 2025 06:31  Patient On (Oxygen Delivery Method): room air        I&O's Summary    19 Apr 2025 07:01  -  20 Apr 2025 07:00  --------------------------------------------------------  IN: 0 mL / OUT: 2300 mL / NET: -2300 mL    20 Apr 2025 07:01  -  21 Apr 2025 06:55  --------------------------------------------------------  IN: 240 mL / OUT: 550 mL / NET: -310 mL        ON EXAM:    General: NAD, awake and alert   HEENT: Mucous membranes are moist, anicteric  Lungs: Non-labored, breath sounds are clear bilaterally, No wheezing, rales or rhonchi  Cardiovascular: Regular, S1 and S2, 2/6 systolic murmur  Gastrointestinal: Bowel Sounds present, soft, nontender.  Colostomy in place  Lymph: No peripheral edema. No lymphadenopathy.  Skin: No rashes or ulcers  Psych:  Unable to properly assess    LABS: All Labs Reviewed:                        12.1   11.19 )-----------( 321      ( 20 Apr 2025 07:23 )             39.8                         12.3   12.47 )-----------( 335      ( 19 Apr 2025 07:02 )             39.5                         10.9   10.55 )-----------( 284      ( 18 Apr 2025 10:27 )             35.2     20 Apr 2025 07:21    144    |  108    |  13     ----------------------------<  44     3.7     |  21     |  0.46   19 Apr 2025 07:06    144    |  105    |  21     ----------------------------<  58     4.2     |  23     |  0.78   18 Apr 2025 10:27    138    |  104    |  16     ----------------------------<  250    4.8     |  23     |  0.58     Ca    9.4        20 Apr 2025 07:21  Ca    9.8        19 Apr 2025 07:06  Ca    9.1        18 Apr 2025 10:27  Phos  2.6       20 Apr 2025 07:21  Phos  1.8       19 Apr 2025 07:06  Phos  1.9       18 Apr 2025 10:27  Mg     2.4       20 Apr 2025 07:21  Mg     2.6       19 Apr 2025 07:06  Mg     2.2       18 Apr 2025 10:27    TPro  7.0    /  Alb  3.8    /  TBili  0.3    /  DBili  x      /  AST  23     /  ALT  34     /  AlkPhos  92     20 Apr 2025 07:21  TPro  7.2    /  Alb  4.0    /  TBili  0.3    /  DBili  x      /  AST  33     /  ALT  44     /  AlkPhos  100    19 Apr 2025 07:06  TPro  6.4    /  Alb  3.6    /  TBili  0.2    /  DBili  x      /  AST  19     /  ALT  31     /  AlkPhos  90     18 Apr 2025 10:27      CARDIAC MARKERS ( 19 Apr 2025 16:40 )  x     / x     / x     / x     / 2.6 ng/mL  CARDIAC MARKERS ( 19 Apr 2025 11:13 )  x     / x     / x     / x     / 3.0 ng/mL      Blood Culture: Organism --  Gram Stain Blood -- Gram Stain   Moderate polymorphonuclear leukocytes per low power field  Few Squamous epithelial cells per low power field  Rare Yeast like cells per oil power field  Specimen Source Sputum Sputum  Culture-Blood --    Organism --  Gram Stain Blood -- Gram Stain --  Specimen Source Blood Blood  Culture-Blood --    Organism --  Gram Stain Blood -- Gram Stain --  Specimen Source Blood Blood  Culture-Blood --    Organism --  Gram Stain Blood -- Gram Stain --  Specimen Source Urine  Culture-Blood --        04-18 @ 10:27  TSH: 0.62

## 2025-04-21 NOTE — PROGRESS NOTE ADULT - PROBLEM SELECTOR PLAN 5
- pt presented with multiple falls in the past month   - Had an inpatient fall 4/20 with face strike and bloody nose and bruised zygomatic process  - CT pelvis with background of bilateral avascular necrosis of the hips      Plan:   - f/u infectious work up as mentioned above   - PT/OT/OOB to chair   - nutrition consult  - check orthostatic VS - negative  *** Will need follow up with orthopedics as an outpatient, can try with Dr Benjamin  - 1:1  - CT Head and maxillofacial pending - pt presented with multiple falls in the past month   - Had an inpatient fall 4/20 with face strike and bloody nose and bruised zygomatic process  - CT pelvis with background of bilateral avascular necrosis of the hips    Plan:   - f/u infectious work up as mentioned above - neg currently   - PT/OT/OOB to chair   - nutrition consult  - check orthostatic VS - negative  - CT Head and maxillofacial - Left periorbital hematoma with a comminuted fracture of the left medial orbital wall and resultant hemorrhagic air-fluid levels in the left ethmoid sinus, extending into the left sphenoid sinus.   - x-ray of wrists b/l showed no acute fracture. No dislocation. Joint spaces are maintained. Vascular calcification is present.  - CT Pelvis showed Degenerative changes of both hips. There is avascular necrosis of both femoral heads without articular surface collapse at this time.  *** Will need follow up with orthopedics as an outpatient, can try with Dr Benjamin  - 1:1

## 2025-04-21 NOTE — PROGRESS NOTE ADULT - PROBLEM SELECTOR PLAN 9
- Pt takes lantus 22 in AM, 28 at night    Plan:   - check A1C - 8   - place pt back at home dose given likely will have hyperglycemia from steroid - lantus 22 in AM and 28 at night   - FS premeal and qhs   - nutrition c/s - Pt takes lantus 22 in AM, 28 at night    Plan:   - check A1C - 8   - place pt back at home dose given likely will have hyperglycemia from steroid - lantus 22 in AM and 28 at night -> switched to lantus 14 AM/14 PM and lantus 10 AM/10PM   - FS premeal and qhs   - nutrition c/s  - pt had multiple episodes of hypoglycemic down titrating her insulin   - Endo c/s, appreciate recs

## 2025-04-21 NOTE — PROGRESS NOTE ADULT - ASSESSMENT
The patient is 73 y/o woman with PMH of epilepsy, COPD, DM, bronchiectasis, tracheomalacia s/p tracheloplasty, HTN, hx of dissection of descending thoracic aorta, hx of aortic aneurysm, type B dissection (7/2024), type A dissection s/p bio AVR with Bentall procedure (9/2022), severe AS s/p TAVR (9/10/2023), TIA's, Afib on Eliquis, colon cancer s/p colostomy, who presents to the ED for fever. Patient reports having cough for past 2-3 days, associated with fever. Patient endorses increased weakness and fatigue. Patient has hx of recurrent PNA  Doxycycline prescribed by PCP. Afebrile in ED. Work up shows: WBC 10.58, Cr 0.77, Lactate 1.3, Procalcitonin 0.08.       - Noted numerous allergies listed to PCN,   cefepime etc. Known hx of aortic aneurysm and dissection. (contra-indication to FQs). Seizure disorder - caution with carbapenems needed.  pt completing a 14 day course of invanz for an ESBL proteus in the sputum given via a pICC line    current admission does not demonstrate a new pna but she has had visual hallucinations    no fever or chills      Impression  # COPD    s/p Invanz for 14 days   Recent fall ( hs of seizures)  Underlying colon cancer,  AVR, aortic aneurysm,   CT noted fracture and hematoma of sinus     Recommendations:  -Would favor to monitor off of antibiotics     MANY ALLERGIES LISTED INCLUDING CEPHALOSPORINS AND PENICILLINS     -Supplemental oxygen as needed to maintain SpO2 88-92%

## 2025-04-21 NOTE — PROGRESS NOTE ADULT - PROBLEM SELECTOR PLAN 1
- pt reports she had generalized body shaking when she was diagnosed with seizure   - visual hallucination "seeing sister and  when they are not there" is a new occurrence that started last Friday, denies auditory hallucination   - pt is AOX3 (at baseline mental status) with no other mood/psychotic symptoms and hallucination is different from prior seizure symptoms, lower concern for psychiatric or seizure etiology for this new onset visual hallucination   - Patient is now s/p fall (4/20) while trying to get away from hallucinations    Plan:   - send infectious workup to r/o infectious etiology of hallucination   - f/u BCx, UCx, procalcitonin - negative so far mri  - MRI brain w/wo - There are multiple chronic microhemorrhages in both cerebral hemispheres with additional chronic microhemorrhages seen in the rocky and cerebellum. Mild microvascular-type changes in the periventricular and deep white matter. S/p endoscopic nasal surgery. Near complete opacification the right maxillary sinus. Dilated superior ophthalmic veins are slightly increased in size from before.  - Neuro c/s, appreciate recs - seem c/w hypnagogic hallucination, can be treated with 12.5 mg quetiapine qhs only if pt is reacting to hallucination/paranoid   - psych c/s, appreciate recs - diff include complicated grief rxn, temporal lobe seizure, medication induced hallucination  - Begin seroquel 12.5mg qHS  - Repeat EKG  - 1:1  - Will follow up with psychiatry - pt reports she had generalized body shaking when she was diagnosed with seizure   - visual hallucination "seeing sister and  when they are not there" is a new occurrence that started last Friday, denies auditory hallucination   - pt is AOX3 (at baseline mental status) with no other mood/psychotic symptoms and hallucination is different from prior seizure symptoms, lower concern for psychiatric or seizure etiology for this new onset visual hallucination   - Patient is now s/p fall (4/20) while trying to get away from hallucinations    Plan:   - send infectious workup to r/o infectious etiology of hallucination   - f/u BCx, UCx, procalcitonin - negative so far mri  - MRI brain w/wo - There are multiple chronic microhemorrhages in both cerebral hemispheres with additional chronic microhemorrhages seen in the rocky and cerebellum. Mild microvascular-type changes in the periventricular and deep white matter. S/p endoscopic nasal surgery. Near complete opacification the right maxillary sinus. Dilated superior ophthalmic veins are slightly increased in size from before.  - Neuro c/s, appreciate recs - seem c/w hypnagogic hallucination, can be treated with 12.5 mg quetiapine qhs only if pt is reacting to hallucination/paranoid   - psych c/s, appreciate recs - diff include complicated grief rxn, temporal lobe seizure, medication induced hallucination  - Begin seroquel 12.5mg qHS and c/w Zoloft 50 mg qhs   - Repeat EKG -  on 4/20   - 1:1

## 2025-04-21 NOTE — PROGRESS NOTE ADULT - SUBJECTIVE AND OBJECTIVE BOX
[facility] DEPARTMENT OF OPHTHALMOLOGY  ------------------------------------------------------------------------------  Vesta Maldonado MD PGY-3  ------------------------------------------------------------------------------    Interval History: No acute events overnight. Today patient reports slight irritation of the left periorbital region with watery discharge. Reports near vision has been fluctuating for the past 6 months and tends to occur with blood sugar level changes. Denies any worsening proptosis or bulging of the right eye.     MEDICATIONS  (STANDING):  albuterol/ipratropium for Nebulization 3 milliLiter(s) Nebulizer every 6 hours  ascorbic acid 500 milliGRAM(s) Oral daily  buDESOnide    Inhalation Suspension 1 milliGRAM(s) Inhalation every 12 hours  chlorhexidine 2% Cloths 1 Application(s) Topical daily  dextrose 5%. 1000 milliLiter(s) (50 mL/Hr) IV Continuous <Continuous>  dextrose 5%. 1000 milliLiter(s) (100 mL/Hr) IV Continuous <Continuous>  dextrose 50% Injectable 25 Gram(s) IV Push once  dextrose 50% Injectable 12.5 Gram(s) IV Push once  dextrose 50% Injectable 25 Gram(s) IV Push once  ferrous    sulfate 325 milliGRAM(s) Oral daily  fluticasone propionate 50 MICROgram(s)/spray Nasal Spray 1 Spray(s) Both Nostrils two times a day  fluticasone propionate/ salmeterol 250-50 MICROgram(s) Diskus 1 Dose(s) Inhalation two times a day  formoterol for nebulization 20 MICROGram(s) Nebulizer two times a day  glucagon  Injectable 1 milliGRAM(s) IntraMuscular once  glycopyrrolate 1 milliGRAM(s) Oral three times a day  insulin glargine Injectable (LANTUS) 14 Unit(s) SubCutaneous at bedtime  insulin lispro (ADMELOG) corrective regimen sliding scale   SubCutaneous three times a day before meals  insulin lispro (ADMELOG) corrective regimen sliding scale   SubCutaneous at bedtime  labetalol 100 milliGRAM(s) Oral every 8 hours  lacosamide 100 milliGRAM(s) Oral two times a day  levETIRAcetam 1000 milliGRAM(s) Oral two times a day  megestrol 20 milliGRAM(s) Oral daily  melatonin 3 milliGRAM(s) Oral at bedtime  mexiletine 200 milliGRAM(s) Oral three times a day  mineral oil 30 milliLiter(s) Oral daily  montelukast 10 milliGRAM(s) Oral at bedtime  multivitamin 1 Tablet(s) Oral daily  mupirocin 2% Nasal 1 Application(s) Both Nostrils two times a day  NIFEdipine XL 60 milliGRAM(s) Oral daily  Ohtuvayre [Ensifentrine] 3mG/2.5mL Nebul 3 milliGRAM(s) 3 milliGRAM(s) Nebulizer two times a day  pantoprazole    Tablet 40 milliGRAM(s) Oral before breakfast  polyethylene glycol 3350 17 Gram(s) Oral daily  QUEtiapine 12.5 milliGRAM(s) Oral at bedtime  rosuvastatin 5 milliGRAM(s) Oral at bedtime  senna 2 Tablet(s) Oral at bedtime  sodium chloride 7% Inhalation 4 milliLiter(s) Inhalation every 12 hours  tiotropium 2.5 MICROgram(s) Inhaler 2 Puff(s) Inhalation daily    MEDICATIONS  (PRN):  dextrose Oral Gel 15 Gram(s) Oral once PRN Blood Glucose LESS THAN 70 milliGRAM(s)/deciliter  dextrose Oral Gel 15 Gram(s) Oral once PRN Blood Glucose LESS THAN 70 milliGRAM(s)/deciliter  magnesium citrate Oral Solution 296 milliLiter(s) Oral every 24 hours PRN Constipation  sertraline 50 milliGRAM(s) Oral daily PRN for anxiety      VITALS: T(C): 36.9 (04-21-25 @ 13:18)  T(F): 98.4 (04-21-25 @ 13:18), Max: 98.4 (04-21-25 @ 13:18)  HR: 76 (04-21-25 @ 13:18) (62 - 84)  BP: 113/64 (04-21-25 @ 13:18) (110/58 - 148/80)  RR:  (18 - 18)  SpO2:  (95% - 98%)  Wt(kg): --  General: AAO x 3, appropriate mood and affect      Ophthalmology Exam:  Visual acuity (sc): 20/25 OD, TRISHA OS  Pupils: pupil is round and reacts briskly OD, TRISHA OS   Ttono: 9 OD, TRISHA OS  Extraocular movements (EOMs): -1 restriction in aBduction OD, OS minimal movement with prosthesis   Confrontational Visual Field (CVF): Full OD, TRISHA OS   Color Plates: 12/12 OD TRISHA OS    Pen Light Exam (PLE)  External: Flat OD with prominent globe; Periorbital ecchymosis OS   Lids/Lashes/Lacrimal Ducts: Flat OD; Ecchymosis OS with no erythema or discharge   Sclera/Conjunctiva: W+Q OD, TRISHA OS  Cornea: Cl OD, TRISHA OS  Anterior Chamber: D+F OD, TRISHA OS   Iris: Flat OD, TRISHA OS   Lens: PCIOL OD, TRISHA OS       Fundus Exam: dilated with 1% tropicamide and 2.5% phenylephrine  Approval obtained from primary team for dilation  Patient aware that pupils can remained dilated for at least 4-6 hours  Exam performed with 20D lens    Vitreous: PVD OD  Disc, cup/disc: sharp and pink, 0.3 OD  Macula: drusen temporal macula OD   Vessels: wnl OD  Periphery: DBH along superior arcade with 1 large DBH OD    Labs/Imaging:  IMPRESSION:    CT HEAD: No acute intracranial hemorrhage, mass effect, or osseous fracture.    CT MAXILLOFACIAL BONES: Left periorbital hematoma with a comminuted fracture of the left medial orbital wall and resultant hemorrhagic air-fluid levels in the left ethmoid sinus, extending into the left sphenoid sinus. There is herniation of the orbital fat and small parts of the left medial rectus and inferior rectus muscles that extends into the defect. Correlate clinically for muscular entrapment.    CT CERVICAL SPINE: No acute cervical spine fracture or traumatic malalignment.

## 2025-04-21 NOTE — PROGRESS NOTE ADULT - SUBJECTIVE AND OBJECTIVE BOX
infectious diseases progress note:    Patient is a 74y old  Female who presents with a chief complaint of visual hallucination, multiple falls, SOB (21 Apr 2025 07:59)        Asthma with acute exacerbation             Allergies    aspirin (Unknown)  codeine (Unknown)  codeine (Short breath)  Valium (Unknown)  cefepime (Short breath (Severe))  shellfish (Anaphylaxis)  penicillin (Anaphylaxis)  cefepime (Anaphylaxis)  penicillin (Unknown)  Percocet (Unknown)  Avelox (Short breath; Pruritus)  Dilaudid (Short breath)  ampicillin (Unknown)  Valium (Short breath)  OxyContin (Unknown)  aspirin (Short breath)  iodine (Short breath; Swelling)  tetanus toxoid (Short breath)  Avelox (Unknown)  meropenem (Unknown)    Intolerances        ANTIBIOTICS/RELEVANT:  antimicrobials    immunologic:    OTHER:  albuterol/ipratropium for Nebulization 3 milliLiter(s) Nebulizer every 6 hours  ascorbic acid 500 milliGRAM(s) Oral daily  buDESOnide    Inhalation Suspension 1 milliGRAM(s) Inhalation every 12 hours  chlorhexidine 2% Cloths 1 Application(s) Topical daily  dextrose 5%. 1000 milliLiter(s) IV Continuous <Continuous>  dextrose 5%. 1000 milliLiter(s) IV Continuous <Continuous>  dextrose 50% Injectable 25 Gram(s) IV Push once  dextrose 50% Injectable 12.5 Gram(s) IV Push once  dextrose 50% Injectable 25 Gram(s) IV Push once  dextrose Oral Gel 15 Gram(s) Oral once PRN  dextrose Oral Gel 15 Gram(s) Oral once PRN  ferrous    sulfate 325 milliGRAM(s) Oral daily  fluticasone propionate 50 MICROgram(s)/spray Nasal Spray 1 Spray(s) Both Nostrils two times a day  fluticasone propionate/ salmeterol 250-50 MICROgram(s) Diskus 1 Dose(s) Inhalation two times a day  formoterol for nebulization 20 MICROGram(s) Nebulizer two times a day  glucagon  Injectable 1 milliGRAM(s) IntraMuscular once  glycopyrrolate 1 milliGRAM(s) Oral three times a day  insulin glargine Injectable (LANTUS) 10 Unit(s) SubCutaneous every morning  insulin glargine Injectable (LANTUS) 10 Unit(s) SubCutaneous at bedtime  insulin lispro (ADMELOG) corrective regimen sliding scale   SubCutaneous three times a day before meals  insulin lispro (ADMELOG) corrective regimen sliding scale   SubCutaneous at bedtime  labetalol 100 milliGRAM(s) Oral every 8 hours  lacosamide 100 milliGRAM(s) Oral two times a day  levETIRAcetam 1000 milliGRAM(s) Oral two times a day  magnesium citrate Oral Solution 296 milliLiter(s) Oral every 24 hours PRN  megestrol 20 milliGRAM(s) Oral daily  mexiletine 200 milliGRAM(s) Oral three times a day  mineral oil 30 milliLiter(s) Oral daily  montelukast 10 milliGRAM(s) Oral at bedtime  multivitamin 1 Tablet(s) Oral daily  mupirocin 2% Nasal 1 Application(s) Both Nostrils two times a day  NIFEdipine XL 60 milliGRAM(s) Oral daily  Ohtuvayre [Ensifentrine] 3mG/2.5mL Nebul 3 milliGRAM(s) 3 milliGRAM(s) Nebulizer two times a day  pantoprazole    Tablet 40 milliGRAM(s) Oral before breakfast  polyethylene glycol 3350 17 Gram(s) Oral daily  predniSONE   Tablet 40 milliGRAM(s) Oral daily  QUEtiapine 12.5 milliGRAM(s) Oral at bedtime  rosuvastatin 5 milliGRAM(s) Oral at bedtime  senna 2 Tablet(s) Oral at bedtime  sertraline 50 milliGRAM(s) Oral daily PRN  sodium chloride 7% Inhalation 4 milliLiter(s) Inhalation every 12 hours  tiotropium 2.5 MICROgram(s) Inhaler 2 Puff(s) Inhalation daily      Objective:  Vital Signs Last 24 Hrs  T(C): 36.2 (21 Apr 2025 06:31), Max: 36.9 (20 Apr 2025 12:13)  T(F): 97.2 (21 Apr 2025 06:31), Max: 98.4 (20 Apr 2025 12:13)  HR: 69 (21 Apr 2025 06:31) (60 - 84)  BP: 110/58 (21 Apr 2025 06:31) (110/58 - 160/82)  BP(mean): --  RR: 18 (21 Apr 2025 06:31) (16 - 18)  SpO2: 98% (21 Apr 2025 06:31) (94% - 98%)    Parameters below as of 21 Apr 2025 06:31  Patient On (Oxygen Delivery Method): room air       Eyes:EBER, EOMI  Ear/Nose/Throat: no oral lesion, no sinus tenderness on percussion	  Neck:no JVD, no lymphadenopathy, supple  Respiratory: CTA barry  Cardiovascular: S1S2 RRR, no murmurs  Gastrointestinal:soft, (+) BS, no HSM  Extremities:no e/e/c        LABS:                        12.6   10.59 )-----------( 283      ( 21 Apr 2025 07:30 )             40.3     04-21    144  |  106  |  16  ----------------------------<  48[LL]  3.9   |  24  |  0.77    Ca    9.3      21 Apr 2025 07:28  Phos  2.7     04-21  Mg     2.3     04-21    TPro  6.8  /  Alb  3.8  /  TBili  0.4  /  DBili  x   /  AST  24  /  ALT  37  /  AlkPhos  87  04-21      Urinalysis Basic - ( 21 Apr 2025 07:28 )    Color: x / Appearance: x / SG: x / pH: x  Gluc: 48 mg/dL / Ketone: x  / Bili: x / Urobili: x   Blood: x / Protein: x / Nitrite: x   Leuk Esterase: x / RBC: x / WBC x   Sq Epi: x / Non Sq Epi: x / Bacteria: x          MICROBIOLOGY:    RECENT CULTURES:  04-18 @ 11:25 Sputum Sputum       Moderate polymorphonuclear leukocytes per low power field  Few Squamous epithelial cells per low power field  Rare Yeast like cells per oil power field           Commensal val consistent with body site    04-17 @ 22:55 Blood Blood                No growth at 72 Hours    04-17 @ 22:45 Blood Blood                No growth at 72 Hours    04-17 @ 19:50 Urine                <10,000 CFU/mL Normal Urogenital Val          RESPIRATORY CULTURES:              RADIOLOGY & ADDITIONAL STUDIES:        Pager [phone]  After 5 pm/weekends or if no response :[phone]

## 2025-04-21 NOTE — BH CONSULTATION LIAISON PROGRESS NOTE - NSBHASSESSMENTFT_PSY_ALL_CORE
Patient is a 74 year old female domiciled with daughter in a private residence with no significant PPHx with PMHx epilepsy, COPD, asthma, DM2, bronchiectasis, tracheomalacia s/p tracheloplasty, HTN, aortic dissection, aortic aneurysm, severe AS s/p TAVR, DVT, AFib, and colon cancer s/p resection with colostomy creation admitted for asthma/COPD exacerbation, consulted for further evaluation of two weeks of hallucinations. Given her COPD/asthma exacerbation, patient was managed with glucocorticoids which may contribute to onset of hallucinations in conjunction with history of multiple  family members in the past year.     : Psych asked to reassess patient after she fell while running away from visual hallucinations this AM. S/p Seroquel 12.5 mg. On evaluation, patient waxes and wanes between AOx1-2 and appears confused. Presentation c/w ongoing delirium 2/2 [facility] [place]. Agree with neurology recs for Seroquel 12.5 mg qHS w/careful monitoring of QTc (490 today).     : Continues to have episodes of hypoglycemia. Intermittent illusions. Rec no changes.     Recs:  - Continue Seroquel 12.5 mg qhs  - Continue Melatonin qHS  - Continue Zoloft 50 mg prn for anxiety (home medication)    - Monitor QTC, hold Seroquel if > 500    - Obs: defer to primary team  - Dispo: pending clinical course, no indication for inpt psych

## 2025-04-21 NOTE — PROGRESS NOTE ADULT - ASSESSMENT
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 Antonio, Type A dissection s/p bioAVR with Bental procedure (9/2022), severe AS s/p TAVR (9/10/2023), TIA's, DVT, Afib on Eliquis,  Colon cancer S/P resection, colostomy bag, neuropathy, Midline for IV ABX placed 2 weeks ago for chronic "TB like" pneumonia on ertapenmen (today is supposed to be the last dose at 6pm) presenting today for worsening shortness of breath over the last 7 days. multiple falls, urinary burning, visual hallucination, admitted for further management of asthma/COPD exacerbation, and further eval of new onset visual hallucination. 4/17 Noted new T wave inversion most prominently in V2.  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 Antonio, Type A dissection s/p bioAVR with Bental procedure (9/2022), severe AS s/p TAVR (9/10/2023), TIA's, DVT, Afib on Eliquis,  Colon cancer S/P resection, colostomy bag, neuropathy, Midline for IV ABX placed 2 weeks ago for chronic "TB like" pneumonia on ertapenmen (today is supposed to be the last dose at 6pm) presenting today for worsening shortness of breath over the last 7 days. multiple falls, urinary burning, visual hallucination, admitted for further management of asthma/COPD exacerbation, and further eval of new onset visual hallucination. 4/17 Noted new T wave inversion most prominently in V2, troponin/CKMB neg, cards c/s. On 4/20, pt had a fall resulting in L periorbital hematoma with comminuted fracture of the left medial orbital wall.

## 2025-04-21 NOTE — BH CONSULTATION LIAISON PROGRESS NOTE - NSBHCONSULTFOLLOWAFTERCARE_PSY_A_CORE FT
OP psych f/u at [facility]- [phone]  [facility] [place] clinic- [phone]

## 2025-04-21 NOTE — CONSULT NOTE ADULT - SUBJECTIVE AND OBJECTIVE BOX
HPI: 74yFemale with pmhx of Epilepsy on Keppra, COPD, asthma  (on CPAP and VATS at home, on chronic steroids), DM2, bronchiectasis, tracheomalacia s/p tracheloplasty, HTN, Hx of dissection of descending thoracic aorta, hx of aortic aneurysm, Type B dissection (7/2024), medically managed initially as she did not meet criteria for surgery at that time), Type A dissection s/p bioAVR with Bental procedure (9/2022), severe AS s/p TAVR (9/10/2023), TIA's, DVT, Afib on Eliquis,  Colon cancer S/P resection, colostomy bag, Midline for IV ABX placed 2 weeks ago for chronic "TB like" pneumonia on ertapenmen presenting to ED for worsening shortness of breath with multiple falls, urinary burning, visual hallucination, admitted for further management of asthma/COPD exacerbation. Patient was on solumedrol 40mg IV on 4/17, followed by prednisone 40mg daily for 4 days. Starting tomorrow 4/22, back on home dose methylprednisolone 8mg daily. Patient intermittently hypoglycemic mainly fasting, and insulin regimen has been adjusted. At this time pt reports wheezing and sob, but no other acute issues.  Pt was seen by Dr. Vasquez last year. Now patient follows Dr. Panchal outpatient and primary team requests house endocrine team to evaluate patient.    Diabetes history: Dx'd age of 16. Initially oral meds but stopped working, where insulin dependent multiple years. Pt follows endocrinologist Dr. Panchal. Next appt June 10th. HbA1c 8.0%. At home, on lantus 22 units qAM and 28 units bedtime plus humalog scale.    Microvascular Complications: Neuropathy    Macrovascular Complications: TIA, CAD    FH: No known family with DM    SH:  Smoking: denies  Etoh: denies  Recreational Drugs: denies    PAST MEDICAL & SURGICAL HISTORY:  Seizure  x 1 1/7/18      DVT (deep venous thrombosis)  15-20 years ago, took coumadin      TIA (transient ischemic attack)  multiple, last 5 years ago - presents as right-sided weakness      COPD (chronic obstructive pulmonary disease)      Atrial fibrillation      History of COPD      Afib      Aortic valve replaced      Epilepsy      Asthma      DM (diabetes mellitus)      History of seizure disorder      History of TIAs      HTN (hypertension)      Bronchiectasis      Colon cancer      History of partial hysterectomy  30 years ago - fibroids      H/O total knee replacement, bilateral  5 years ago      History of sinus surgery  multiple sinus surgeries      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      H/O pelvic surgery  5 years ago - s/p fracture      History of tracheomalacia  2015 - attempted tracheal stenting ([facility] course complicated by obstruction, respiratory failure, multiple CPR attempts -  stent discontinued; 10/20/2016 Tracheobronchoplasty (Prolene Mesh) performed at [facility] by Dr Zapien      S/P bronchoscopy  6/5/2018 - [facility] (Dr Zapien) no evidence of tracheobronchomalacia in trachea or bronchial tubes      Rectal bleeding  exam under anesthesia ([facility]) 2/2018      S/P TAVR (transcatheter aortic valve replacement)      S/P aortic valve replacement      S/P colostomy      S/P AVR (aortic valve replacement)              Current Meds:  albuterol/ipratropium for Nebulization 3 milliLiter(s) Nebulizer every 6 hours  ascorbic acid 500 milliGRAM(s) Oral daily  buDESOnide    Inhalation Suspension 1 milliGRAM(s) Inhalation every 12 hours  chlorhexidine 2% Cloths 1 Application(s) Topical daily  dextrose 5%. 1000 milliLiter(s) IV Continuous <Continuous>  dextrose 5%. 1000 milliLiter(s) IV Continuous <Continuous>  dextrose 50% Injectable 25 Gram(s) IV Push once  dextrose 50% Injectable 12.5 Gram(s) IV Push once  dextrose 50% Injectable 25 Gram(s) IV Push once  dextrose Oral Gel 15 Gram(s) Oral once PRN  dextrose Oral Gel 15 Gram(s) Oral once PRN  ferrous    sulfate 325 milliGRAM(s) Oral daily  fluticasone propionate 50 MICROgram(s)/spray Nasal Spray 1 Spray(s) Both Nostrils two times a day  fluticasone propionate/ salmeterol 250-50 MICROgram(s) Diskus 1 Dose(s) Inhalation two times a day  formoterol for nebulization 20 MICROGram(s) Nebulizer two times a day  glucagon  Injectable 1 milliGRAM(s) IntraMuscular once  glycopyrrolate 1 milliGRAM(s) Oral three times a day  insulin glargine Injectable (LANTUS) 14 Unit(s) SubCutaneous at bedtime  insulin lispro (ADMELOG) corrective regimen sliding scale   SubCutaneous three times a day before meals  insulin lispro (ADMELOG) corrective regimen sliding scale   SubCutaneous at bedtime  labetalol 100 milliGRAM(s) Oral every 8 hours  lacosamide 100 milliGRAM(s) Oral two times a day  levETIRAcetam 1000 milliGRAM(s) Oral two times a day  magnesium citrate Oral Solution 296 milliLiter(s) Oral every 24 hours PRN  megestrol 20 milliGRAM(s) Oral daily  melatonin 3 milliGRAM(s) Oral at bedtime  mexiletine 200 milliGRAM(s) Oral three times a day  mineral oil 30 milliLiter(s) Oral daily  montelukast 10 milliGRAM(s) Oral at bedtime  multivitamin 1 Tablet(s) Oral daily  mupirocin 2% Nasal 1 Application(s) Both Nostrils two times a day  NIFEdipine XL 60 milliGRAM(s) Oral daily  Ohtuvayre [Ensifentrine] 3mG/2.5mL Nebul 3 milliGRAM(s) 3 milliGRAM(s) Nebulizer two times a day  pantoprazole    Tablet 40 milliGRAM(s) Oral before breakfast  polyethylene glycol 3350 17 Gram(s) Oral daily  QUEtiapine 12.5 milliGRAM(s) Oral at bedtime  rosuvastatin 5 milliGRAM(s) Oral at bedtime  senna 2 Tablet(s) Oral at bedtime  sertraline 50 milliGRAM(s) Oral daily PRN  sodium chloride 7% Inhalation 4 milliLiter(s) Inhalation every 12 hours  tiotropium 2.5 MICROgram(s) Inhaler 2 Puff(s) Inhalation daily      Allergies:  aspirin (Unknown)  codeine (Unknown)  codeine (Short breath)  Valium (Unknown)  cefepime (Short breath (Severe))  shellfish (Anaphylaxis)  penicillin (Anaphylaxis)  cefepime (Anaphylaxis)  penicillin (Unknown)  Percocet (Unknown)  Avelox (Short breath; Pruritus)  Dilaudid (Short breath)  ampicillin (Unknown)  Valium (Short breath)  OxyContin (Unknown)  aspirin (Short breath)  iodine (Short breath; Swelling)  tetanus toxoid (Short breath)  Avelox (Unknown)  meropenem (Unknown)      ROS:     Constitutional: No fever, good appetite/po intake  Eyes: No blurry vision, diplopia  Neuro: No tremors  HEENT: No pain  Cardiovascular: No chest pain, palpitations  Respiratory: + SOB, no cough  GI: No nausea, vomiting,   : No dysuria, hematuria  Skin: no rash  Psych: no depression  Endocrine: no polyuria, polydipsia  Hem/lymph: no swelling  Osteoporosis: no fractures     Vital Signs Last 24 Hrs  T(C): 36.9 (21 Apr 2025 13:18), Max: 36.9 (21 Apr 2025 13:18)  T(F): 98.4 (21 Apr 2025 13:18), Max: 98.4 (21 Apr 2025 13:18)  HR: 76 (21 Apr 2025 13:18) (60 - 84)  BP: 113/64 (21 Apr 2025 13:18) (110/58 - 148/80)  BP(mean): --  RR: 18 (21 Apr 2025 13:18) (18 - 18)  SpO2: 95% (21 Apr 2025 13:18) (95% - 98%)    Parameters below as of 21 Apr 2025 13:18  Patient On (Oxygen Delivery Method): room air      Height (cm): 154.2 (04-17 @ 20:00)  Weight (kg): 65.8 (04-17 @ 20:00)  BMI (kg/m2): 27.7 (04-17 @ 20:00)    VITALS: T(C): 36.9 (04-21-25 @ 13:18)  T(F): 98.4 (04-21-25 @ 13:18), Max: 98.4 (04-21-25 @ 13:18)  HR: 76 (04-21-25 @ 13:18) (60 - 84)  BP: 113/64 (04-21-25 @ 13:18) (110/58 - 148/80)  RR:  (18 - 18)  SpO2:  (95% - 98%)  Wt(kg): --  GENERAL: NAD, well-groomed, well-developed  EYES: No proptosis, no lid lag, anicteric, extraocular movements intact  HEENT:  Atraumatic, Normocephalic, moist mucous membranes  RESPIRATORY: non labored breathing, no accessory muscle use, +wheezing  CARDIOVASCULAR: non tachycardic, no peripheral edema  GI: Soft, nontender, non distended   SKIN: Dry, intact, No rashes or lesions  NEURO: sensation intact, no tremor  EXTREMITIES: no foot ulcers and bilateral distal pedal pulses intact  PSYCH: reactive affect, euthymic mood      LABS:                        12.6   10.59 )-----------( 283      ( 21 Apr 2025 07:30 )             40.3     04-21    144  |  106  |  16  ----------------------------<  48[LL]  3.9   |  24  |  0.77    Ca    9.3      21 Apr 2025 07:28  Phos  2.7     04-21  Mg     2.3     04-21    TPro  6.8  /  Alb  3.8  /  TBili  0.4  /  DBili  x   /  AST  24  /  ALT  37  /  AlkPhos  87  04-21      Urinalysis Basic - ( 21 Apr 2025 07:28 )    Color: x / Appearance: x / SG: x / pH: x  Gluc: 48 mg/dL / Ketone: x  / Bili: x / Urobili: x   Blood: x / Protein: x / Nitrite: x   Leuk Esterase: x / RBC: x / WBC x   Sq Epi: x / Non Sq Epi: x / Bacteria: x        Thyroid Stimulating Hormone, Serum: 0.62 (04-18 @ 10:27)  Thyroid Stimulating Hormone, Serum: 2.65 (11-07 @ 06:56)  Thyroid Stimulating Hormone, Serum: 1.84 (10-10 @ 09:19)  Thyroid Stimulating Hormone, Serum: 0.30 (07-03 @ 14:53)      RADIOLOGY & ADDITIONAL STUDIES:  CAPILLARY BLOOD GLUCOSE      POCT Blood Glucose.: 160 mg/dL (21 Apr 2025 12:43)  POCT Blood Glucose.: 192 mg/dL (21 Apr 2025 08:51)  POCT Blood Glucose.: 65 mg/dL (21 Apr 2025 08:18)  POCT Blood Glucose.: 53 mg/dL (21 Apr 2025 08:09)  POCT Blood Glucose.: 70 mg/dL (21 Apr 2025 06:17)  POCT Blood Glucose.: 74 mg/dL (20 Apr 2025 22:05)  POCT Blood Glucose.: 92 mg/dL (20 Apr 2025 18:48)

## 2025-04-21 NOTE — PROGRESS NOTE ADULT - ASSESSMENT
74y Female with pmhx of Epilepsy on Keppra, COPD, asthma, DM2, tracheomalacia s/p tracheloplasty, HTN, Hx of dissection of descending thoracic aorta, hx of aortic aneurysm, Type B dissection (7/2024), Type A dissection s/p bioAVR with Bental procedure (9/2022), severe AS s/p TAVR (9/10/2023), TIA's, DVT, Afib on Eliquis,  Colon cancer S/P resection, colostomy bag, neuropathy, admitted for worsening shortness of breath, multiple fals      #Abnormal EKG  - EKG this admission shows a wide QRS rhythm that is likely regularized AF with a nonspecific IVCD and nonspecific T wave changes.  Has had junctional rhythm in the past as well.  Does not seem to be symptomamtic from this  - No evidence of ACS  - Troponin negative  - Patient denies chest pain   - No further inpatient cardiac workup anticipated at this time  - Unclear per chart review why patient is on mexilitine.  Need to investigate further. Continue for now.     #PAF  - AC on hold due to frequent falls  - Patient with Fall 4/20 in the AM due to hallucinations    #AS  - S/p TAVR 2023    #HTN  - Bp labile  - Check orthostatics  - Given history of frequent falls, would be lenient with BP Control   - Continue labetalol and nifedipine for now.

## 2025-04-21 NOTE — BH CONSULTATION LIAISON PROGRESS NOTE - NSBHCHARTREVIEWVS_PSY_A_CORE FT
Vital Signs Last 24 Hrs  T(C): 36.2 (21 Apr 2025 06:31), Max: 36.9 (20 Apr 2025 12:13)  T(F): 97.2 (21 Apr 2025 06:31), Max: 98.4 (20 Apr 2025 12:13)  HR: 68 (21 Apr 2025 09:09) (60 - 84)  BP: 110/58 (21 Apr 2025 06:31) (110/58 - 160/82)  BP(mean): --  RR: 18 (21 Apr 2025 06:31) (16 - 18)  SpO2: 97% (21 Apr 2025 09:09) (94% - 98%)    Parameters below as of 21 Apr 2025 06:31  Patient On (Oxygen Delivery Method): room air    
Vital Signs Last 24 Hrs  T(C): 36.9 (20 Apr 2025 12:13), Max: 36.9 (20 Apr 2025 12:13)  T(F): 98.4 (20 Apr 2025 12:13), Max: 98.4 (20 Apr 2025 12:13)  HR: 75 (20 Apr 2025 12:13) (51 - 75)  BP: 119/57 (20 Apr 2025 12:13) (119/57 - 174/89)  BP(mean): --  RR: 16 (20 Apr 2025 12:13) (16 - 18)  SpO2: 94% (20 Apr 2025 12:13) (93% - 98%)    Parameters below as of 20 Apr 2025 12:13  Patient On (Oxygen Delivery Method): room air

## 2025-04-21 NOTE — CONSULT NOTE ADULT - REASON FOR ADMISSION
visual hallucination, multiple falls, SOB

## 2025-04-21 NOTE — PROGRESS NOTE ADULT - PROBLEM SELECTOR PLAN 11
- Hx of dissection of descending thoracic aorta, , hx of aortic aneurysm,  Type B dissection (7/2024), medically managed initially as she did not meet criteria for surgery at that time), Type A dissection s/p bioAVR with Bental procedure (9/2022)    Plan:   - c/w labetalol 100 mg TID   - strict BP control - Hx of dissection of descending thoracic aorta, , hx of aortic aneurysm,  Type B dissection (7/2024), medically managed initially as she did not meet criteria for surgery at that time), Type A dissection s/p bioAVR with Bental procedure (9/2022)    Plan:   - c/w labetalol 100 mg TID   - strict BP control  - increased nifedipine 30 mg qd to 60 mg qd for stricter BP control i.s.o dissection, SBP goal <14

## 2025-04-21 NOTE — PROGRESS NOTE ADULT - ASSESSMENT
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 Thre is herniation of the orbital fat and small parts of the left medial rectus and inferior rectus muscles that extends into the defect.   -Imaging reviewed with oculoplastics   -Prosthetic removed and examined to be intact with no underlying fibrosis or heme, conformer with overlying conj appear intact and flat  -No entrapment symptoms i.e nausea/vomiting, bradycardia.  -On exam, VA 20/25 OD, IOP 13, pupil is round and briskly reactive OD, CVF OD full   -EOM OD with -1 deficit in aBduction (possible effort vs possible thyroidopathy) otherwise full OD, OS with minimal movements in all directions (stable per daughter)   -external exam with mild periorbital edema and erythema OS, OD exam is wnl   -DFE OD with 0.3 ONH, flat macula, peripheral retina wnl   -TSH 0.62, free thyroxine 1.4 on 4/18/25   -No indication for surgical intervention from ophthalmic standpoint  -OMFS/facial plastics evaluation   -Ice packs to left eye q1h to the left periorbital area   -Pt was instructed to avoid nose blowing, straining, sneezing, no straws. Head of bed elevation at 30-degrees when at rest.  -Recommend preservative free artificial tears q4 hours in both eyes   -Rest of care per medicine     Seen and discussed with Dr. Lozada, consult attending       Outpatient Follow-up: Patient should follow-up with his/her ophthalmologist or with [facility] Department of Ophthalmology within 1 week of after discharge at:    [address]  [place] [address]  [phone]    Snow Perez MD, PGY-2  Also available on [employer] Teams

## 2025-04-21 NOTE — BH CONSULTATION LIAISON PROGRESS NOTE - CURRENT MEDICATION
MEDICATIONS  (STANDING):  albuterol/ipratropium for Nebulization 3 milliLiter(s) Nebulizer every 6 hours  ascorbic acid 500 milliGRAM(s) Oral daily  buDESOnide    Inhalation Suspension 1 milliGRAM(s) Inhalation every 12 hours  chlorhexidine 2% Cloths 1 Application(s) Topical daily  dextrose 5%. 1000 milliLiter(s) (50 mL/Hr) IV Continuous <Continuous>  dextrose 5%. 1000 milliLiter(s) (100 mL/Hr) IV Continuous <Continuous>  dextrose 50% Injectable 25 Gram(s) IV Push once  dextrose 50% Injectable 12.5 Gram(s) IV Push once  dextrose 50% Injectable 25 Gram(s) IV Push once  ferrous    sulfate 325 milliGRAM(s) Oral daily  fluticasone propionate 50 MICROgram(s)/spray Nasal Spray 1 Spray(s) Both Nostrils two times a day  fluticasone propionate/ salmeterol 250-50 MICROgram(s) Diskus 1 Dose(s) Inhalation two times a day  formoterol for nebulization 20 MICROGram(s) Nebulizer two times a day  glucagon  Injectable 1 milliGRAM(s) IntraMuscular once  glycopyrrolate 1 milliGRAM(s) Oral three times a day  insulin glargine Injectable (LANTUS) 10 Unit(s) SubCutaneous every morning  insulin glargine Injectable (LANTUS) 10 Unit(s) SubCutaneous at bedtime  insulin lispro (ADMELOG) corrective regimen sliding scale   SubCutaneous three times a day before meals  insulin lispro (ADMELOG) corrective regimen sliding scale   SubCutaneous at bedtime  labetalol 100 milliGRAM(s) Oral every 8 hours  lacosamide 100 milliGRAM(s) Oral two times a day  levETIRAcetam 1000 milliGRAM(s) Oral two times a day  megestrol 20 milliGRAM(s) Oral daily  mexiletine 200 milliGRAM(s) Oral three times a day  mineral oil 30 milliLiter(s) Oral daily  montelukast 10 milliGRAM(s) Oral at bedtime  multivitamin 1 Tablet(s) Oral daily  mupirocin 2% Nasal 1 Application(s) Both Nostrils two times a day  NIFEdipine XL 60 milliGRAM(s) Oral daily  Ohtuvayre [Ensifentrine] 3mG/2.5mL Nebul 3 milliGRAM(s) 3 milliGRAM(s) Nebulizer two times a day  pantoprazole    Tablet 40 milliGRAM(s) Oral before breakfast  polyethylene glycol 3350 17 Gram(s) Oral daily  predniSONE   Tablet 40 milliGRAM(s) Oral daily  QUEtiapine 12.5 milliGRAM(s) Oral at bedtime  rosuvastatin 5 milliGRAM(s) Oral at bedtime  senna 2 Tablet(s) Oral at bedtime  sodium chloride 7% Inhalation 4 milliLiter(s) Inhalation every 12 hours  tiotropium 2.5 MICROgram(s) Inhaler 2 Puff(s) Inhalation daily    MEDICATIONS  (PRN):  dextrose Oral Gel 15 Gram(s) Oral once PRN Blood Glucose LESS THAN 70 milliGRAM(s)/deciliter  dextrose Oral Gel 15 Gram(s) Oral once PRN Blood Glucose LESS THAN 70 milliGRAM(s)/deciliter  magnesium citrate Oral Solution 296 milliLiter(s) Oral every 24 hours PRN Constipation  sertraline 50 milliGRAM(s) Oral daily PRN for anxiety  
MEDICATIONS  (STANDING):  albuterol/ipratropium for Nebulization 3 milliLiter(s) Nebulizer every 6 hours  ascorbic acid 500 milliGRAM(s) Oral daily  buDESOnide    Inhalation Suspension 1 milliGRAM(s) Inhalation every 12 hours  chlorhexidine 2% Cloths 1 Application(s) Topical daily  dextrose 5%. 1000 milliLiter(s) (50 mL/Hr) IV Continuous <Continuous>  dextrose 5%. 1000 milliLiter(s) (100 mL/Hr) IV Continuous <Continuous>  dextrose 50% Injectable 25 Gram(s) IV Push once  dextrose 50% Injectable 12.5 Gram(s) IV Push once  dextrose 50% Injectable 25 Gram(s) IV Push once  ferrous    sulfate 325 milliGRAM(s) Oral daily  fluticasone propionate 50 MICROgram(s)/spray Nasal Spray 1 Spray(s) Both Nostrils two times a day  fluticasone propionate/ salmeterol 250-50 MICROgram(s) Diskus 1 Dose(s) Inhalation two times a day  formoterol for nebulization 20 MICROGram(s) Nebulizer two times a day  glucagon  Injectable 1 milliGRAM(s) IntraMuscular once  glycopyrrolate 1 milliGRAM(s) Oral three times a day  insulin glargine Injectable (LANTUS) 14 Unit(s) SubCutaneous every morning  insulin glargine Injectable (LANTUS) 14 Unit(s) SubCutaneous at bedtime  insulin lispro (ADMELOG) corrective regimen sliding scale   SubCutaneous three times a day before meals  insulin lispro (ADMELOG) corrective regimen sliding scale   SubCutaneous at bedtime  labetalol 100 milliGRAM(s) Oral every 8 hours  lacosamide 100 milliGRAM(s) Oral two times a day  levETIRAcetam 1000 milliGRAM(s) Oral two times a day  megestrol 20 milliGRAM(s) Oral daily  mexiletine 200 milliGRAM(s) Oral three times a day  mineral oil 30 milliLiter(s) Oral daily  montelukast 10 milliGRAM(s) Oral at bedtime  multivitamin 1 Tablet(s) Oral daily  mupirocin 2% Nasal 1 Application(s) Both Nostrils two times a day  NIFEdipine XL 60 milliGRAM(s) Oral daily  Ohtuvayre [Ensifentrine] 3mG/2.5mL Nebul 3 milliGRAM(s)   Nebulizer two times a day  pantoprazole    Tablet 40 milliGRAM(s) Oral before breakfast  polyethylene glycol 3350 17 Gram(s) Oral daily  predniSONE   Tablet 40 milliGRAM(s) Oral daily  QUEtiapine 12.5 milliGRAM(s) Oral at bedtime  rosuvastatin 5 milliGRAM(s) Oral at bedtime  senna 2 Tablet(s) Oral at bedtime  sodium chloride 7% Inhalation 4 milliLiter(s) Inhalation every 12 hours  tiotropium 2.5 MICROgram(s) Inhaler 2 Puff(s) Inhalation daily    MEDICATIONS  (PRN):  dextrose Oral Gel 15 Gram(s) Oral once PRN Blood Glucose LESS THAN 70 milliGRAM(s)/deciliter  dextrose Oral Gel 15 Gram(s) Oral once PRN Blood Glucose LESS THAN 70 milliGRAM(s)/deciliter  magnesium citrate Oral Solution 296 milliLiter(s) Oral every 24 hours PRN Constipation  sertraline 50 milliGRAM(s) Oral daily PRN for anxiety

## 2025-04-21 NOTE — BH CONSULTATION LIAISON PROGRESS NOTE - NSBHATTESTCOMMENTATTENDFT_PSY_A_CORE
This is a 74-year-old AAF patient, domiciled with daughter in a private residence with no significant PPHx with PMHx epilepsy, COPD, asthma, DM2, bronchiectasis, tracheomalacia s/p tracheloplasty, HTN, aortic dissection, aortic aneurysm, severe AS s/p TAVR, DVT, AFib, and colon cancer s/p resection with colostomy creation admitted for asthma/COPD exacerbation, consulted for further evaluation of two weeks of hallucinations.    I have seen and evaluated this patient myself. Chart, labs, meds reviewed. I agree with fellow's assessment and plan. Given her COPD/asthma exacerbation, patient was managed with glucocorticoids which may contribute to onset of hallucinations in conjunction with history of multiple  family members in the past year. Patient presenting with exophthalmos, please complete thyroid panel.   This is a 74-year-old AAF patient, domiciled with daughter in a private residence with no significant PPHx with PMHx epilepsy, COPD, asthma, DM2, bronchiectasis, tracheomalacia s/p tracheloplasty, HTN, aortic dissection, aortic aneurysm, severe AS s/p TAVR, DVT, AFib, and colon cancer s/p resection with colostomy creation admitted for asthma/COPD exacerbation, consulted for further evaluation of two weeks of hallucinations.    I have seen and evaluated this patient myself. Chart, labs, meds reviewed. I agree with fellow's assessment and plan. Given her COPD/asthma exacerbation, patient was managed with glucocorticoids which may contribute to onset of hallucinations in conjunction with history of multiple  family members in the past year. Patient presenting with exophthalmos, please consider a complete thyroid panel.

## 2025-04-21 NOTE — CONSULT NOTE ADULT - ASSESSMENT
74y Female with pmhx of Epilepsy on Keppra, COPD, asthma  (on CPAP and VATS at home, on chronic steroids), DM2, bronchiectasis, tracheomalacia s/p tracheloplasty, HTN, Hx of dissection of descending thoracic aorta, hx of aortic aneurysm, Type B dissection (7/2024), medically managed initially as she did not meet criteria for surgery at that time), Type A dissection s/p bioAVR with Bental procedure (9/2022), severe AS s/p TAVR (9/10/2023), TIA's, DVT, Afib on Eliquis,  Colon cancer S/P resection, colostomy bag, Midline for IV ABX placed 2 weeks ago for chronic "TB like" pneumonia on ertapenmen presenting to ED for worsening shortness of breath with multiple falls, urinary burning, visual hallucination, admitted for further management of asthma/COPD exacerbation. Endocrine consulted for T2DM on steroids    # T2DM  # Steroid induced hyperglycemia  - Most recent Hemoglobin A1C 8.0%  - At home, on lantus 22 units qAM and 28 units bedtime plus humalog scale  - S/p solumedrol 40mg IV on 4/17, followed by prednisone 40mg daily for 4 days. Starting tomorrow 4/22, back on home dose methylprednisolone 8mg daily.  - Fasting hypoglycemia while on regimen similar to home dose consisting of BID lantus and scale. Will simplify regimen with daily lantus insulin instead, and conservative approach, as steroid has been back to home dose.    Recommendations:  - Stop insulin Glargine AM dose  - Decrease insulin Glargine to 14 units QHS  - Modify to low correctional scale TID with meals  - Modify to low dose correctional scale QHS  - Please monitor blood glucose values TID AC & QHS while eating regular meals and Q6H while NPO  - Follow hypoglycemia protocol  - Current diet: carb consistent diet     Discharge recs:  - Patient to be discharged on basal bolus insulin regimen- dosage TBD  - Continue freestyle chin 3 sensor CGM to monitor BG at home  - Patient to follow up with endocrinologist Dr. Panchal- appt scheduled for 6/10 1:15pm    # On systemic steroids  - On methylprednisolone daily as above for COPD  - With chronic steroid use, pt likely has central adrenal insufficiency, for which steroid should not be stopped abruptly and will require slow taper course, or assess HPA axis before discontinuation if indicated    # HTN  - BP goal <130/80  - Manage per primary team     # HLD  - Continue with rosuvastatin QHS  - Goal LDL<70  - Manage as outpatient       Thank you for the consult. Will continue to monitor. Please inform the endocrinology team at least 24 hours prior to intended discharge date.    Contact via pager or [employer] during business hours. For follow up questions, discharge recommendations, or new consults please call answering service at [phone] (weekdays), [phone] (nights/weekends). For nonurgent matters, please email  [email or web address].      Ismael Lynne MD  Attending Physician   Department of Endocrinology, Diabetes and Metabolism   [employer]

## 2025-04-22 ENCOUNTER — APPOINTMENT (OUTPATIENT)
Dept: PULMONOLOGY | Facility: CLINIC | Age: 75
End: 2025-04-22

## 2025-04-22 DIAGNOSIS — E11.65 TYPE 2 DIABETES MELLITUS WITH HYPERGLYCEMIA: ICD-10-CM

## 2025-04-22 LAB
ALBUMIN SERPL ELPH-MCNC: 3.6 G/DL — SIGNIFICANT CHANGE UP (ref 3.3–5)
ALP SERPL-CCNC: 81 U/L — SIGNIFICANT CHANGE UP (ref 40–120)
ALT FLD-CCNC: 31 U/L — SIGNIFICANT CHANGE UP (ref 10–45)
ANION GAP SERPL CALC-SCNC: 12 MMOL/L — SIGNIFICANT CHANGE UP (ref 5–17)
AST SERPL-CCNC: 19 U/L — SIGNIFICANT CHANGE UP (ref 10–40)
BASOPHILS # BLD AUTO: 0.03 K/UL — SIGNIFICANT CHANGE UP (ref 0–0.2)
BASOPHILS NFR BLD AUTO: 0.2 % — SIGNIFICANT CHANGE UP (ref 0–2)
BILIRUB SERPL-MCNC: 0.3 MG/DL — SIGNIFICANT CHANGE UP (ref 0.2–1.2)
BLD GP AB SCN SERPL QL: NEGATIVE — SIGNIFICANT CHANGE UP
BUN SERPL-MCNC: 15 MG/DL — SIGNIFICANT CHANGE UP (ref 7–23)
CALCIUM SERPL-MCNC: 9.1 MG/DL — SIGNIFICANT CHANGE UP (ref 8.4–10.5)
CHLORIDE SERPL-SCNC: 109 MMOL/L — HIGH (ref 96–108)
CO2 SERPL-SCNC: 24 MMOL/L — SIGNIFICANT CHANGE UP (ref 22–31)
CREAT SERPL-MCNC: 0.78 MG/DL — SIGNIFICANT CHANGE UP (ref 0.5–1.3)
EGFR: 79 ML/MIN/1.73M2 — SIGNIFICANT CHANGE UP
EGFR: 79 ML/MIN/1.73M2 — SIGNIFICANT CHANGE UP
EOSINOPHIL # BLD AUTO: 0.11 K/UL — SIGNIFICANT CHANGE UP (ref 0–0.5)
EOSINOPHIL NFR BLD AUTO: 0.9 % — SIGNIFICANT CHANGE UP (ref 0–6)
GLUCOSE BLDC GLUCOMTR-MCNC: 119 MG/DL — HIGH (ref 70–99)
GLUCOSE BLDC GLUCOMTR-MCNC: 135 MG/DL — HIGH (ref 70–99)
GLUCOSE BLDC GLUCOMTR-MCNC: 193 MG/DL — HIGH (ref 70–99)
GLUCOSE BLDC GLUCOMTR-MCNC: 208 MG/DL — HIGH (ref 70–99)
GLUCOSE SERPL-MCNC: 71 MG/DL — SIGNIFICANT CHANGE UP (ref 70–99)
HCT VFR BLD CALC: 36.9 % — SIGNIFICANT CHANGE UP (ref 34.5–45)
HGB BLD-MCNC: 11.5 G/DL — SIGNIFICANT CHANGE UP (ref 11.5–15.5)
IMM GRANULOCYTES NFR BLD AUTO: 0.6 % — SIGNIFICANT CHANGE UP (ref 0–0.9)
LYMPHOCYTES # BLD AUTO: 1.55 K/UL — SIGNIFICANT CHANGE UP (ref 1–3.3)
LYMPHOCYTES # BLD AUTO: 12.4 % — LOW (ref 13–44)
MAGNESIUM SERPL-MCNC: 2.3 MG/DL — SIGNIFICANT CHANGE UP (ref 1.6–2.6)
MCHC RBC-ENTMCNC: 24.1 PG — LOW (ref 27–34)
MCHC RBC-ENTMCNC: 31.2 G/DL — LOW (ref 32–36)
MCV RBC AUTO: 77.2 FL — LOW (ref 80–100)
MONOCYTES # BLD AUTO: 1.55 K/UL — HIGH (ref 0–0.9)
MONOCYTES NFR BLD AUTO: 12.4 % — SIGNIFICANT CHANGE UP (ref 2–14)
NEUTROPHILS # BLD AUTO: 9.22 K/UL — HIGH (ref 1.8–7.4)
NEUTROPHILS NFR BLD AUTO: 73.5 % — SIGNIFICANT CHANGE UP (ref 43–77)
NRBC BLD AUTO-RTO: 0 /100 WBCS — SIGNIFICANT CHANGE UP (ref 0–0)
PHOSPHATE SERPL-MCNC: 2.7 MG/DL — SIGNIFICANT CHANGE UP (ref 2.5–4.5)
PLATELET # BLD AUTO: 296 K/UL — SIGNIFICANT CHANGE UP (ref 150–400)
POTASSIUM SERPL-MCNC: 3.7 MMOL/L — SIGNIFICANT CHANGE UP (ref 3.5–5.3)
POTASSIUM SERPL-SCNC: 3.7 MMOL/L — SIGNIFICANT CHANGE UP (ref 3.5–5.3)
PROT SERPL-MCNC: 6.2 G/DL — SIGNIFICANT CHANGE UP (ref 6–8.3)
RBC # BLD: 4.78 M/UL — SIGNIFICANT CHANGE UP (ref 3.8–5.2)
RBC # FLD: 16.8 % — HIGH (ref 10.3–14.5)
RH IG SCN BLD-IMP: POSITIVE — SIGNIFICANT CHANGE UP
SODIUM SERPL-SCNC: 145 MMOL/L — SIGNIFICANT CHANGE UP (ref 135–145)
WBC # BLD: 12.53 K/UL — HIGH (ref 3.8–10.5)
WBC # FLD AUTO: 12.53 K/UL — HIGH (ref 3.8–10.5)

## 2025-04-22 PROCEDURE — 99233 SBSQ HOSP IP/OBS HIGH 50: CPT | Mod: GC

## 2025-04-22 PROCEDURE — 99232 SBSQ HOSP IP/OBS MODERATE 35: CPT

## 2025-04-22 PROCEDURE — G0545: CPT

## 2025-04-22 PROCEDURE — 99233 SBSQ HOSP IP/OBS HIGH 50: CPT

## 2025-04-22 RX ORDER — INSULIN GLARGINE-YFGN 100 [IU]/ML
12 INJECTION, SOLUTION SUBCUTANEOUS AT BEDTIME
Refills: 0 | Status: DISCONTINUED | OUTPATIENT
Start: 2025-04-22 | End: 2025-04-24

## 2025-04-22 RX ORDER — INSULIN LISPRO 100 U/ML
2 INJECTION, SOLUTION INTRAVENOUS; SUBCUTANEOUS
Refills: 0 | Status: DISCONTINUED | OUTPATIENT
Start: 2025-04-22 | End: 2025-04-24

## 2025-04-22 RX ADMIN — LACOSAMIDE 100 MILLIGRAM(S): 150 TABLET, FILM COATED ORAL at 17:53

## 2025-04-22 RX ADMIN — MEXILETINE HYDROCHLORIDE 200 MILLIGRAM(S): 250 CAPSULE ORAL at 05:55

## 2025-04-22 RX ADMIN — Medication 40 MILLIGRAM(S): at 06:09

## 2025-04-22 RX ADMIN — FORMOTEROL FUMARATE DIHYDRATE 20 MICROGRAM(S): 20 SOLUTION RESPIRATORY (INHALATION) at 05:55

## 2025-04-22 RX ADMIN — Medication 60 MILLIGRAM(S): at 21:55

## 2025-04-22 RX ADMIN — INSULIN LISPRO 0: 100 INJECTION, SOLUTION INTRAVENOUS; SUBCUTANEOUS at 21:53

## 2025-04-22 RX ADMIN — Medication 30 MILLILITER(S): at 14:37

## 2025-04-22 RX ADMIN — LABETALOL HYDROCHLORIDE 100 MILLIGRAM(S): 200 TABLET, FILM COATED ORAL at 06:10

## 2025-04-22 RX ADMIN — LABETALOL HYDROCHLORIDE 100 MILLIGRAM(S): 200 TABLET, FILM COATED ORAL at 14:42

## 2025-04-22 RX ADMIN — QUETIAPINE FUMARATE 12.5 MILLIGRAM(S): 25 TABLET ORAL at 21:56

## 2025-04-22 RX ADMIN — Medication 1 TABLET(S): at 12:19

## 2025-04-22 RX ADMIN — INSULIN LISPRO 1: 100 INJECTION, SOLUTION INTRAVENOUS; SUBCUTANEOUS at 18:12

## 2025-04-22 RX ADMIN — FLUTICASONE PROPIONATE 1 SPRAY(S): 50 SPRAY, METERED NASAL at 06:08

## 2025-04-22 RX ADMIN — IPRATROPIUM BROMIDE AND ALBUTEROL SULFATE 3 MILLILITER(S): .5; 2.5 SOLUTION RESPIRATORY (INHALATION) at 12:20

## 2025-04-22 RX ADMIN — METHYLPREDNISOLONE ACETATE 8 MILLIGRAM(S): 80 INJECTION, SUSPENSION INTRA-ARTICULAR; INTRALESIONAL; INTRAMUSCULAR; SOFT TISSUE at 06:10

## 2025-04-22 RX ADMIN — Medication 20 MILLIGRAM(S): at 14:37

## 2025-04-22 RX ADMIN — Medication 2 TABLET(S): at 21:56

## 2025-04-22 RX ADMIN — MEXILETINE HYDROCHLORIDE 200 MILLIGRAM(S): 250 CAPSULE ORAL at 14:37

## 2025-04-22 RX ADMIN — Medication 1 DOSE(S): at 05:55

## 2025-04-22 RX ADMIN — Medication 4 MILLILITER(S): at 17:48

## 2025-04-22 RX ADMIN — GLYCOPYRROLATE 1 MILLIGRAM(S): 0.2 INJECTION INTRAMUSCULAR; INTRAVENOUS at 05:56

## 2025-04-22 RX ADMIN — INSULIN GLARGINE-YFGN 12 UNIT(S): 100 INJECTION, SOLUTION SUBCUTANEOUS at 21:52

## 2025-04-22 RX ADMIN — LEVETIRACETAM 1000 MILLIGRAM(S): 10 INJECTION, SOLUTION INTRAVENOUS at 06:09

## 2025-04-22 RX ADMIN — IPRATROPIUM BROMIDE AND ALBUTEROL SULFATE 3 MILLILITER(S): .5; 2.5 SOLUTION RESPIRATORY (INHALATION) at 17:47

## 2025-04-22 RX ADMIN — MUPIROCIN CALCIUM 1 APPLICATION(S): 20 CREAM TOPICAL at 17:48

## 2025-04-22 RX ADMIN — Medication 1 APPLICATION(S): at 14:14

## 2025-04-22 RX ADMIN — BUDESONIDE 1 MILLIGRAM(S): 0.25 SUSPENSION RESPIRATORY (INHALATION) at 17:48

## 2025-04-22 RX ADMIN — BUDESONIDE 1 MILLIGRAM(S): 0.25 SUSPENSION RESPIRATORY (INHALATION) at 06:10

## 2025-04-22 RX ADMIN — GLYCOPYRROLATE 1 MILLIGRAM(S): 0.2 INJECTION INTRAMUSCULAR; INTRAVENOUS at 14:37

## 2025-04-22 RX ADMIN — Medication 3 MILLIGRAM(S): at 21:55

## 2025-04-22 RX ADMIN — TIOTROPIUM BROMIDE INHALATION SPRAY 2 PUFF(S): 3.12 SPRAY, METERED RESPIRATORY (INHALATION) at 12:20

## 2025-04-22 RX ADMIN — IPRATROPIUM BROMIDE AND ALBUTEROL SULFATE 3 MILLILITER(S): .5; 2.5 SOLUTION RESPIRATORY (INHALATION) at 05:56

## 2025-04-22 RX ADMIN — Medication 1 DOSE(S): at 17:47

## 2025-04-22 RX ADMIN — Medication 500 MILLIGRAM(S): at 12:18

## 2025-04-22 RX ADMIN — MUPIROCIN CALCIUM 1 APPLICATION(S): 20 CREAM TOPICAL at 05:55

## 2025-04-22 RX ADMIN — POLYETHYLENE GLYCOL 3350 17 GRAM(S): 17 POWDER, FOR SOLUTION ORAL at 12:21

## 2025-04-22 RX ADMIN — LEVETIRACETAM 1000 MILLIGRAM(S): 10 INJECTION, SOLUTION INTRAVENOUS at 17:48

## 2025-04-22 RX ADMIN — Medication 4 MILLILITER(S): at 05:55

## 2025-04-22 RX ADMIN — GLYCOPYRROLATE 1 MILLIGRAM(S): 0.2 INJECTION INTRAMUSCULAR; INTRAVENOUS at 21:56

## 2025-04-22 RX ADMIN — Medication 325 MILLIGRAM(S): at 12:18

## 2025-04-22 RX ADMIN — FORMOTEROL FUMARATE DIHYDRATE 20 MICROGRAM(S): 20 SOLUTION RESPIRATORY (INHALATION) at 18:35

## 2025-04-22 RX ADMIN — LACOSAMIDE 100 MILLIGRAM(S): 150 TABLET, FILM COATED ORAL at 06:09

## 2025-04-22 RX ADMIN — ROSUVASTATIN CALCIUM 5 MILLIGRAM(S): 20 TABLET, FILM COATED ORAL at 21:56

## 2025-04-22 RX ADMIN — FLUTICASONE PROPIONATE 1 SPRAY(S): 50 SPRAY, METERED NASAL at 17:47

## 2025-04-22 RX ADMIN — MONTELUKAST SODIUM 10 MILLIGRAM(S): 10 TABLET ORAL at 21:55

## 2025-04-22 RX ADMIN — MEXILETINE HYDROCHLORIDE 200 MILLIGRAM(S): 250 CAPSULE ORAL at 21:55

## 2025-04-22 NOTE — PROGRESS NOTE ADULT - ASSESSMENT
74y F w/h/o uncontrolled T2DM (A1C 8%) on bid basal insulin plus Humalog scale PTA. DM c/b TIA/neuropathy. Also h/o epilepsy on Keppra, COPD, asthma  (on CPAP and VATS at home, on chronic steroids), bronchiectasis, tracheomalacia s/p tracheloplasty, HTN, Hx of dissection of descending thoracic aorta, hx of aortic aneurysm, Type B dissection (7/2024), medically managed initially as she did not meet criteria for surgery at that time), Type A dissection s/p bioAVR with Bental procedure (9/2022), severe AS s/p TAVR (9/10/2023), TIA's, DVT, Afib on Eliquis,Colon cancer S/P resection, colostomy bag, Midline for IV ABX placed 2 weeks ago for chronic "TB like" pneumonia on ertapenmen. Here with worsening shortness of breath with multiple falls, urinary burning, visual hallucination, admitted for further management of asthma/COPD exacerbation. Endocrine consulted for T2DM on steroid pulse and now tapered to back to home steroid dose. FBG tightly controlled with BG going up when pt eats.  today for lunch because pt didn't eat breakfast.  ac dinner after having lunch. Will continue to decrease basal insulin and add meal time insulin while on present insulin regimen to keep BG goal 100 to 180s without hypoglycemia.     Home regimen per EMR:  lantus 22 units qAM and 28 units bedtime plus humalog scale. Pt can't recall what she is doing but states sometimes she wakes up at night with BG at 50s.   S/p solumedrol 40mg IV on 4/17, followed by prednisone 40mg daily for 4 days. Starting tomorrow 4/22, back on home dose methylprednisolone 8mg daily.

## 2025-04-22 NOTE — PROGRESS NOTE ADULT - PROBLEM SELECTOR PLAN 8
Plan:   - hold eliquis 5 mg BID i.s.o fall on 4/20   - will resume eliquis 5mg BID once cleared by plastic and ophthalmology Plan:   - hold eliquis 5 mg BID i.s.o fall on 4/20   - will resume eliquis 5mg BID once cleared by plastic and ophthalmology  - per plastic surgery on 4/22, clear to resume AC  - given pt's multiple falls and hematoma, will discuss with pt and HCP regarding benefits and risk of resuming AC

## 2025-04-22 NOTE — PROGRESS NOTE ADULT - PROBLEM SELECTOR PLAN 1
-Test BG ac and hs. Q6H while NPO  - Decrease insulin Glargine to 12 units QHS for now. Noted pt received Lantus 10 units in am yesterday and 14 units last night  - Add Admelog 2 units ac meals. HOLD IF NOT EATING!!  - C/w low correctional scales TID with meals and QHS  - Needs RD consult  - Will follow.   - Please inform endo team of any changes on steroid therapy.    Discharge recs:  -Will be determined according to BG/insulin needs/PO intake and steroid therapy at time of discharge.  - Basal/bolus insulin versus basal/oral. Pt having trouble recalling insulin she takes at home and states she manages insulin independently but can't verbalize how.   -Might need daughter to assist with insulin dosing once a day and then place oral meds on pill box. TBD  - Continue freestyle chin 3 sensor CGM to monitor BG at home. Pt states she has reader  - Patient to follow up with endocrinologist Dr. Panchal- appt scheduled for 6/10 1:15pm  - Needs Optho/ renal/cardiac follow up

## 2025-04-22 NOTE — PROGRESS NOTE ADULT - PROBLEM SELECTOR PLAN 1
- pt reports she had generalized body shaking when she was diagnosed with seizure   - visual hallucination "seeing sister and  when they are not there" is a new occurrence that started last Friday, denies auditory hallucination   - pt is AOX3 (at baseline mental status) with no other mood/psychotic symptoms and hallucination is different from prior seizure symptoms, lower concern for psychiatric or seizure etiology for this new onset visual hallucination   - Patient is now s/p fall (4/20) while trying to get away from hallucinations    Plan:   - send infectious workup to r/o infectious etiology of hallucination   - f/u BCx, UCx, procalcitonin - negative so far mri  - MRI brain w/wo - There are multiple chronic microhemorrhages in both cerebral hemispheres with additional chronic microhemorrhages seen in the rocky and cerebellum. Mild microvascular-type changes in the periventricular and deep white matter. S/p endoscopic nasal surgery. Near complete opacification the right maxillary sinus. Dilated superior ophthalmic veins are slightly increased in size from before.  - Neuro c/s, appreciate recs - seem c/w hypnagogic hallucination, can be treated with 12.5 mg quetiapine qhs only if pt is reacting to hallucination/paranoid   - psych c/s, appreciate recs - diff include complicated grief rxn, temporal lobe seizure, medication induced hallucination  - Begin seroquel 12.5mg qHS and c/w Zoloft 50 mg qhs   - Repeat EKG -  on 4/20   - 1:1 - pt reports she had generalized body shaking when she was diagnosed with seizure   - visual hallucination "seeing sister and  when they are not there" is a new occurrence that started last Friday, denies auditory hallucination   - pt is AOX3 (at baseline mental status) with no other mood/psychotic symptoms and hallucination is different from prior seizure symptoms, lower concern for psychiatric or seizure etiology for this new onset visual hallucination   - Patient is now s/p fall (4/20) while trying to get away from hallucinations    Plan:   - send infectious workup to r/o infectious etiology of hallucination   - f/u BCx, UCx, procalcitonin - negative so far   - MRI brain w/wo - There are multiple chronic microhemorrhages in both cerebral hemispheres with additional chronic microhemorrhages seen in the rocky and cerebellum. Mild microvascular-type changes in the periventricular and deep white matter. S/p endoscopic nasal surgery. Near complete opacification the right maxillary sinus. Dilated superior ophthalmic veins are slightly increased in size from before.  - Neuro c/s, appreciate recs - seem c/w hypnagogic hallucination, can be treated with 12.5 mg quetiapine qhs only if pt is reacting to hallucination/paranoid   - psych c/s, appreciate recs - diff include complicated grief rxn, temporal lobe seizure, medication induced hallucination  - Begin seroquel 12.5mg qHS and c/w Zoloft 50 mg qhs   - Repeat EKG -  on 4/20   - 1:1

## 2025-04-22 NOTE — PROGRESS NOTE ADULT - ASSESSMENT
The patient is 73 y/o woman with PMH of epilepsy, COPD, DM, bronchiectasis, tracheomalacia s/p tracheloplasty, HTN, hx of dissection of descending thoracic aorta, hx of aortic aneurysm, type B dissection (7/2024), type A dissection s/p bio AVR with Bentall procedure (9/2022), severe AS s/p TAVR (9/10/2023), TIA's, Afib on Eliquis, colon cancer s/p colostomy, who presents to the ED for fever. Patient reports having cough for past 2-3 days, associated with fever. Patient endorses increased weakness and fatigue. Patient has hx of recurrent PNA  Doxycycline prescribed by PCP. Afebrile in ED. Work up shows: WBC 10.58, Cr 0.77, Lactate 1.3, Procalcitonin 0.08.       - Noted numerous allergies listed to PCN,   cefepime etc. Known hx of aortic aneurysm and dissection. (contra-indication to FQs). Seizure disorder - caution with carbapenems needed.  pt completing a 14 day course of invanz for an ESBL proteus in the sputum given via a pICC line    current admission does not demonstrate a new pna but she has had visual hallucinations    no fever or chills      Impression  # COPD    s/p Invanz for 14 days   Recent fall ( hs of seizures)  Underlying colon cancer,  AVR, aortic aneurysm,   CT noted fracture and hematoma of sinus     Recommendations:  -Would favor to monitor off of antibiotics  stable off ab      MANY ALLERGIES LISTED INCLUDING CEPHALOSPORINS AND PENICILLINS     -Supplemental oxygen as needed to maintain SpO2 88-92%

## 2025-04-22 NOTE — PROGRESS NOTE ADULT - PROBLEM SELECTOR PLAN 4
- per pt, she has been on Ertapenem for TB like PNA, last dose should be today per daughter     Plan:   - ID c/s, appreciate recs - hold Ertapenem (can increase seizure threshold) and monitor off Abx   - check sputum Cx - moderate polymorphonuclear leukocytes, few squamous epithelial cells, rare yeast   - give benadryl 50 mg PO prior to Ertapenem  - Sputum Cx showed moderate polymorphonuclear leukocytes, few squamous epithelial cells, rare yeast   - pulm c/s, appreciate recs - resume home dose methylprednisolone 8 mg qd

## 2025-04-22 NOTE — PROGRESS NOTE ADULT - PROBLEM SELECTOR PLAN 5
- pt presented with multiple falls in the past month   - Had an inpatient fall 4/20 with face strike and bloody nose and bruised zygomatic process  - CT pelvis with background of bilateral avascular necrosis of the hips    Plan:   - f/u infectious work up as mentioned above - neg currently   - PT/OT/OOB to chair   - nutrition consult  - check orthostatic VS - negative  - CT Head and maxillofacial - Left periorbital hematoma with a comminuted fracture of the left medial orbital wall and resultant hemorrhagic air-fluid levels in the left ethmoid sinus, extending into the left sphenoid sinus.   - x-ray of wrists b/l showed no acute fracture. No dislocation. Joint spaces are maintained. Vascular calcification is present.  - CT Pelvis showed Degenerative changes of both hips. There is avascular necrosis of both femoral heads without articular surface collapse at this time.  *** Will need follow up with orthopedics as an outpatient, can try with Dr Benjamin  - 1:1 - pt presented with multiple falls in the past month   - Had an inpatient fall 4/20 with face strike and bloody nose and bruised zygomatic process  - CT pelvis with background of bilateral avascular necrosis of the hips    Plan:   - f/u infectious work up as mentioned above - neg currently   - PT/OT/OOB to chair   - nutrition consult  - check orthostatic VS - negative  - CT Head and maxillofacial - Left periorbital hematoma with a comminuted fracture of the left medial orbital wall and resultant hemorrhagic air-fluid levels in the left ethmoid sinus, extending into the left sphenoid sinus.   - x-ray of wrists b/l showed no acute fracture. No dislocation. Joint spaces are maintained. Vascular calcification is present.  - CT Pelvis showed Degenerative changes of both hips. There is avascular necrosis of both femoral heads without articular surface collapse at this time.  *** Will need follow up with orthopedics as an outpatient, can try with Dr Benjamin  - 1:1   - plastic surgery and opthalmology c/s, appreciate recs  - 4/22 noted to have blood tinged sputum, discussed with plastics and opthalmology, per plastic, this can be related to the medial orbital wall fracture, however no acute intervention is needed, CTM and ok to resume AC

## 2025-04-22 NOTE — PROGRESS NOTE ADULT - PROBLEM SELECTOR PLAN 2
- tachpechnic to RR of 30s, proBNP 3648 (baseline 1896 in Feb 2025)  -  CXR showed trace R pleural effusion.   - Received solumedrol 40 mg IV, duoneb x 3 in ED    Plan:   - c/w duoneb Q6 standing, Advair BID, Spiriva qd   - c/w budesonide 1mg Q12, montelukast 10 mg qd   - Pulm c/s, appreciate recs   - full RVP - neg   - pt uses CPAP and VESE at home (pt's daughter brought them in from home)   - chest PT, hypertonic saline   -   - PT/OT OOB to chair as much as possible   - advised pt's daughter to bring in Performist BID and Ohtuvayre BID from home as pharmacy don't carry it here  - 4/21 switched from prednisone 40 mg to methyprednisone 8 mg qd per pulm (s/p IV methylprednisolone 40 and 60)

## 2025-04-22 NOTE — PROGRESS NOTE ADULT - SUBJECTIVE AND OBJECTIVE BOX
CHIEF COMPLAINT: Hallucinations    Interval Events: Did not want to speak to me today, abruptly denied any breathing issues.     REVIEW OF SYSTEMS:  Constitutional: [ ] negative [ ] fevers [ ] chills [ ] weight loss [ ] weight gain  HEENT: [ ] negative [ ] dry eyes [ ] eye irritation [ ] postnasal drip [ ] nasal congestion  CV: [ ] negative  [ ] chest pain [ ] orthopnea [ ] palpitations [ ] murmur  Resp: [ ] negative [ ] cough [ ] shortness of breath [ ] dyspnea [ ] wheezing [ ] sputum [ ] hemoptysis  GI: [ ] negative [ ] nausea [ ] vomiting [ ] diarrhea [ ] constipation [ ] abd pain [ ] dysphagia   : [ ] negative [ ] dysuria [ ] nocturia [ ] hematuria [ ] increased urinary frequency  Musculoskeletal: [ ] negative [ ] back pain [ ] myalgias [ ] arthralgias [ ] fracture  Skin: [ ] negative [ ] rash [ ] itch  Neurological: [ ] negative [ ] headache [ ] dizziness [ ] syncope [ ] weakness [ ] numbness  Psychiatric: [ ] negative [ ] anxiety [ ] depression  Endocrine: [ ] negative [ ] diabetes [ ] thyroid problem  Hematologic/Lymphatic: [ ] negative [ ] anemia [ ] bleeding problem  Allergic/Immunologic: [ ] negative [ ] itchy eyes [ ] nasal discharge [ ] hives [ ] angioedema  [X] All other systems negative  [ ] Unable to assess ROS because ________    OBJECTIVE:  ICU Vital Signs Last 24 Hrs  T(C): 37.3 (22 Apr 2025 06:10), Max: 37.3 (22 Apr 2025 06:10)  T(F): 99.2 (22 Apr 2025 06:10), Max: 99.2 (22 Apr 2025 06:10)  HR: 80 (22 Apr 2025 09:25) (76 - 82)  BP: 132/63 (22 Apr 2025 06:10) (113/64 - 156/76)  BP(mean): --  ABP: --  ABP(mean): --  RR: 18 (22 Apr 2025 06:10) (18 - 18)  SpO2: 95% (22 Apr 2025 09:25) (93% - 95%)    O2 Parameters below as of 22 Apr 2025 06:10  Patient On (Oxygen Delivery Method): room air              CAPILLARY BLOOD GLUCOSE      POCT Blood Glucose.: 135 mg/dL (22 Apr 2025 12:13)      PHYSICAL EXAM:  General: NAD, resting comfortably  HEENT: EOMI, PERRLA. Exophthalomos  Neck: Supple  Respiratory: Scattered wheeze  Cardiovascular: S1S2   Abdomen: Soft, NTND, BS+  Extremities: No edema  Skin: Warm and dry  Neurological: Currently AAOx3    HOSPITAL MEDICATIONS:  MEDICATIONS  (STANDING):  albuterol/ipratropium for Nebulization 3 milliLiter(s) Nebulizer every 6 hours  ascorbic acid 500 milliGRAM(s) Oral daily  buDESOnide    Inhalation Suspension 1 milliGRAM(s) Inhalation every 12 hours  chlorhexidine 2% Cloths 1 Application(s) Topical daily  dextrose 5%. 1000 milliLiter(s) (50 mL/Hr) IV Continuous <Continuous>  dextrose 5%. 1000 milliLiter(s) (100 mL/Hr) IV Continuous <Continuous>  dextrose 50% Injectable 25 Gram(s) IV Push once  dextrose 50% Injectable 12.5 Gram(s) IV Push once  dextrose 50% Injectable 25 Gram(s) IV Push once  ferrous    sulfate 325 milliGRAM(s) Oral daily  fluticasone propionate 50 MICROgram(s)/spray Nasal Spray 1 Spray(s) Both Nostrils two times a day  fluticasone propionate/ salmeterol 250-50 MICROgram(s) Diskus 1 Dose(s) Inhalation two times a day  formoterol for nebulization 20 MICROGram(s) Nebulizer two times a day  glucagon  Injectable 1 milliGRAM(s) IntraMuscular once  glycopyrrolate 1 milliGRAM(s) Oral three times a day  insulin glargine Injectable (LANTUS) 14 Unit(s) SubCutaneous at bedtime  insulin lispro (ADMELOG) corrective regimen sliding scale   SubCutaneous three times a day before meals  insulin lispro (ADMELOG) corrective regimen sliding scale   SubCutaneous at bedtime  labetalol 100 milliGRAM(s) Oral every 8 hours  lacosamide 100 milliGRAM(s) Oral two times a day  levETIRAcetam 1000 milliGRAM(s) Oral two times a day  megestrol 20 milliGRAM(s) Oral daily  melatonin 3 milliGRAM(s) Oral at bedtime  methylPREDNISolone 8 milliGRAM(s) Oral daily  mexiletine 200 milliGRAM(s) Oral three times a day  mineral oil 30 milliLiter(s) Oral daily  montelukast 10 milliGRAM(s) Oral at bedtime  multivitamin 1 Tablet(s) Oral daily  mupirocin 2% Nasal 1 Application(s) Both Nostrils two times a day  NIFEdipine XL 60 milliGRAM(s) Oral daily  Ohtuvayre [Ensifentrine] 3mG/2.5mL Nebul 3 milliGRAM(s) 3 milliGRAM(s) Nebulizer two times a day  pantoprazole    Tablet 40 milliGRAM(s) Oral before breakfast  polyethylene glycol 3350 17 Gram(s) Oral daily  QUEtiapine 12.5 milliGRAM(s) Oral at bedtime  rosuvastatin 5 milliGRAM(s) Oral at bedtime  senna 2 Tablet(s) Oral at bedtime  sodium chloride 7% Inhalation 4 milliLiter(s) Inhalation every 12 hours  tiotropium 2.5 MICROgram(s) Inhaler 2 Puff(s) Inhalation daily    MEDICATIONS  (PRN):  dextrose Oral Gel 15 Gram(s) Oral once PRN Blood Glucose LESS THAN 70 milliGRAM(s)/deciliter  dextrose Oral Gel 15 Gram(s) Oral once PRN Blood Glucose LESS THAN 70 milliGRAM(s)/deciliter  magnesium citrate Oral Solution 296 milliLiter(s) Oral every 24 hours PRN Constipation  sertraline 50 milliGRAM(s) Oral daily PRN for anxiety      LABS:                        11.5   12.53 )-----------( 296      ( 22 Apr 2025 06:58 )             36.9     Hgb Trend: 11.5<--, 12.6<--, 12.1<--, 12.3<--, 10.9<--  04-22    145  |  109[H]  |  15  ----------------------------<  71  3.7   |  24  |  0.78    Ca    9.1      22 Apr 2025 06:58  Phos  2.7     04-22  Mg     2.3     04-22    TPro  6.2  /  Alb  3.6  /  TBili  0.3  /  DBili  x   /  AST  19  /  ALT  31  /  AlkPhos  81  04-22    Creatinine Trend: 0.78<--, 0.77<--, 0.46<--, 0.78<--, 0.58<--, 0.67<--    Urinalysis Basic - ( 22 Apr 2025 06:58 )    Color: x / Appearance: x / SG: x / pH: x  Gluc: 71 mg/dL / Ketone: x  / Bili: x / Urobili: x   Blood: x / Protein: x / Nitrite: x   Leuk Esterase: x / RBC: x / WBC x   Sq Epi: x / Non Sq Epi: x / Bacteria: x        Venous Blood Gas:  04-21 @ 07:31  7.39/47/44/28/73.2  VBG Lactate: 2.0      MICROBIOLOGY:       RADIOLOGY:  [ ] Reviewed and interpreted by me    PULMONARY FUNCTION TESTS:    EKG:

## 2025-04-22 NOTE — PROGRESS NOTE ADULT - PROBLEM SELECTOR PLAN 11
- Hx of dissection of descending thoracic aorta, , hx of aortic aneurysm,  Type B dissection (7/2024), medically managed initially as she did not meet criteria for surgery at that time), Type A dissection s/p bioAVR with Bental procedure (9/2022)    Plan:   - c/w labetalol 100 mg TID   - strict BP control  - increased nifedipine 30 mg qd to 60 mg qd for stricter BP control i.s.o dissection, SBP goal <14

## 2025-04-22 NOTE — PROGRESS NOTE ADULT - SUBJECTIVE AND OBJECTIVE BOX
*******************************  Hien Chavez, PGY-1  Internal Medicine  Contact via [employer] TEAMS    *******************************    PROGRESS NOTE:     Patient is a 74y old  Female who presents with a chief complaint of visual hallucination, multiple falls, SOB (21 Apr 2025 15:11)      INTERVAL EVENTS: No acute overnight events.     SUBJECTIVE: Patient seen and examined at bedside. This morning, the patient is comfortable and doing well. No acute complaints. Denies fevers, chills, N/V/D, chest pain, SOB, abdominal pain.    MEDICATIONS  (STANDING):  albuterol/ipratropium for Nebulization 3 milliLiter(s) Nebulizer every 6 hours  ascorbic acid 500 milliGRAM(s) Oral daily  buDESOnide    Inhalation Suspension 1 milliGRAM(s) Inhalation every 12 hours  chlorhexidine 2% Cloths 1 Application(s) Topical daily  dextrose 5%. 1000 milliLiter(s) (50 mL/Hr) IV Continuous <Continuous>  dextrose 5%. 1000 milliLiter(s) (100 mL/Hr) IV Continuous <Continuous>  dextrose 50% Injectable 25 Gram(s) IV Push once  dextrose 50% Injectable 12.5 Gram(s) IV Push once  dextrose 50% Injectable 25 Gram(s) IV Push once  ferrous    sulfate 325 milliGRAM(s) Oral daily  fluticasone propionate 50 MICROgram(s)/spray Nasal Spray 1 Spray(s) Both Nostrils two times a day  fluticasone propionate/ salmeterol 250-50 MICROgram(s) Diskus 1 Dose(s) Inhalation two times a day  formoterol for nebulization 20 MICROGram(s) Nebulizer two times a day  glucagon  Injectable 1 milliGRAM(s) IntraMuscular once  glycopyrrolate 1 milliGRAM(s) Oral three times a day  insulin glargine Injectable (LANTUS) 14 Unit(s) SubCutaneous at bedtime  insulin lispro (ADMELOG) corrective regimen sliding scale   SubCutaneous three times a day before meals  insulin lispro (ADMELOG) corrective regimen sliding scale   SubCutaneous at bedtime  labetalol 100 milliGRAM(s) Oral every 8 hours  lacosamide 100 milliGRAM(s) Oral two times a day  levETIRAcetam 1000 milliGRAM(s) Oral two times a day  megestrol 20 milliGRAM(s) Oral daily  melatonin 3 milliGRAM(s) Oral at bedtime  methylPREDNISolone 8 milliGRAM(s) Oral daily  mexiletine 200 milliGRAM(s) Oral three times a day  mineral oil 30 milliLiter(s) Oral daily  montelukast 10 milliGRAM(s) Oral at bedtime  multivitamin 1 Tablet(s) Oral daily  mupirocin 2% Nasal 1 Application(s) Both Nostrils two times a day  NIFEdipine XL 60 milliGRAM(s) Oral daily  Ohtuvayre [Ensifentrine] 3mG/2.5mL Nebul 3 milliGRAM(s) 3 milliGRAM(s) Nebulizer two times a day  pantoprazole    Tablet 40 milliGRAM(s) Oral before breakfast  polyethylene glycol 3350 17 Gram(s) Oral daily  QUEtiapine 12.5 milliGRAM(s) Oral at bedtime  rosuvastatin 5 milliGRAM(s) Oral at bedtime  senna 2 Tablet(s) Oral at bedtime  sodium chloride 7% Inhalation 4 milliLiter(s) Inhalation every 12 hours  tiotropium 2.5 MICROgram(s) Inhaler 2 Puff(s) Inhalation daily    MEDICATIONS  (PRN):  dextrose Oral Gel 15 Gram(s) Oral once PRN Blood Glucose LESS THAN 70 milliGRAM(s)/deciliter  dextrose Oral Gel 15 Gram(s) Oral once PRN Blood Glucose LESS THAN 70 milliGRAM(s)/deciliter  magnesium citrate Oral Solution 296 milliLiter(s) Oral every 24 hours PRN Constipation  sertraline 50 milliGRAM(s) Oral daily PRN for anxiety      CAPILLARY BLOOD GLUCOSE      POCT Blood Glucose.: 148 mg/dL (21 Apr 2025 21:51)  POCT Blood Glucose.: 251 mg/dL (21 Apr 2025 17:10)  POCT Blood Glucose.: 160 mg/dL (21 Apr 2025 12:43)  POCT Blood Glucose.: 192 mg/dL (21 Apr 2025 08:51)  POCT Blood Glucose.: 65 mg/dL (21 Apr 2025 08:18)  POCT Blood Glucose.: 53 mg/dL (21 Apr 2025 08:09)    I&O's Summary      PHYSICAL EXAM:  Vital Signs Last 24 Hrs  T(C): 37.3 (22 Apr 2025 06:10), Max: 37.3 (22 Apr 2025 06:10)  T(F): 99.2 (22 Apr 2025 06:10), Max: 99.2 (22 Apr 2025 06:10)  HR: 82 (22 Apr 2025 06:10) (68 - 82)  BP: 132/63 (22 Apr 2025 06:10) (113/64 - 156/76)  BP(mean): --  RR: 18 (22 Apr 2025 06:10) (18 - 18)  SpO2: 95% (22 Apr 2025 06:10) (93% - 97%)    Parameters below as of 22 Apr 2025 06:10  Patient On (Oxygen Delivery Method): room air        GENERAL: NAD, lying in bed comfortably  HEAD: Atraumatic, normocephalic  EYES: EOMI, PERRLA, conjunctiva and sclera clear  ENT: Moist mucous membranes  NECK: Supple, no JVD  HEART: S1, S2, Regular rate and rhythm, no murmurs, rubs, or gallops  LUNGS: Unlabored respirations, clear to auscultation bilaterally, no crackles, wheezing, or rhonchi  ABDOMEN: Soft, nontender, nondistended, +BS  EXTREMITIES: 2+ peripheral pulses bilaterally. No clubbing, cyanosis, or edema  NERVOUS SYSTEM:  A&Ox3, no focal deficits   SKIN: No rashes or lesions    LABS:                        11.5   12.53 )-----------( 296      ( 22 Apr 2025 06:58 )             36.9     04-22    145  |  109[H]  |  15  ----------------------------<  71  3.7   |  24  |  0.78    Ca    9.1      22 Apr 2025 06:58  Phos  2.7     04-22  Mg     2.3     04-22    TPro  6.2  /  Alb  3.6  /  TBili  0.3  /  DBili  x   /  AST  19  /  ALT  31  /  AlkPhos  81  04-22          Urinalysis Basic - ( 22 Apr 2025 06:58 )    Color: x / Appearance: x / SG: x / pH: x  Gluc: 71 mg/dL / Ketone: x  / Bili: x / Urobili: x   Blood: x / Protein: x / Nitrite: x   Leuk Esterase: x / RBC: x / WBC x   Sq Epi: x / Non Sq Epi: x / Bacteria: x          RADIOLOGY & ADDITIONAL TESTS:  Results Reviewed:   Imaging Personally Reviewed:  Electrocardiogram Personally Reviewed:  Tele: *******************************  Hien Chavez, PGY-1  Internal Medicine  Contact via [employer] TEAMS    *******************************    PROGRESS NOTE:     Patient is a 74y old  Female who presents with a chief complaint of visual hallucination, multiple falls, SOB (21 Apr 2025 15:11)      INTERVAL EVENTS: Noted to have blood tinged Sputum overnight    SUBJECTIVE: Patient seen and examined at bedside. This morning, the patient reports feeling ok with no acute complaints. Denies fevers, chills, N/V/D, chest pain, SOB, abdominal pain. Per staff at bedside, pt has been reporting less visual hallucination.     MEDICATIONS  (STANDING):  albuterol/ipratropium for Nebulization 3 milliLiter(s) Nebulizer every 6 hours  ascorbic acid 500 milliGRAM(s) Oral daily  buDESOnide    Inhalation Suspension 1 milliGRAM(s) Inhalation every 12 hours  chlorhexidine 2% Cloths 1 Application(s) Topical daily  dextrose 5%. 1000 milliLiter(s) (50 mL/Hr) IV Continuous <Continuous>  dextrose 5%. 1000 milliLiter(s) (100 mL/Hr) IV Continuous <Continuous>  dextrose 50% Injectable 25 Gram(s) IV Push once  dextrose 50% Injectable 12.5 Gram(s) IV Push once  dextrose 50% Injectable 25 Gram(s) IV Push once  ferrous    sulfate 325 milliGRAM(s) Oral daily  fluticasone propionate 50 MICROgram(s)/spray Nasal Spray 1 Spray(s) Both Nostrils two times a day  fluticasone propionate/ salmeterol 250-50 MICROgram(s) Diskus 1 Dose(s) Inhalation two times a day  formoterol for nebulization 20 MICROGram(s) Nebulizer two times a day  glucagon  Injectable 1 milliGRAM(s) IntraMuscular once  glycopyrrolate 1 milliGRAM(s) Oral three times a day  insulin glargine Injectable (LANTUS) 14 Unit(s) SubCutaneous at bedtime  insulin lispro (ADMELOG) corrective regimen sliding scale   SubCutaneous three times a day before meals  insulin lispro (ADMELOG) corrective regimen sliding scale   SubCutaneous at bedtime  labetalol 100 milliGRAM(s) Oral every 8 hours  lacosamide 100 milliGRAM(s) Oral two times a day  levETIRAcetam 1000 milliGRAM(s) Oral two times a day  megestrol 20 milliGRAM(s) Oral daily  melatonin 3 milliGRAM(s) Oral at bedtime  methylPREDNISolone 8 milliGRAM(s) Oral daily  mexiletine 200 milliGRAM(s) Oral three times a day  mineral oil 30 milliLiter(s) Oral daily  montelukast 10 milliGRAM(s) Oral at bedtime  multivitamin 1 Tablet(s) Oral daily  mupirocin 2% Nasal 1 Application(s) Both Nostrils two times a day  NIFEdipine XL 60 milliGRAM(s) Oral daily  Ohtuvayre [Ensifentrine] 3mG/2.5mL Nebul 3 milliGRAM(s) 3 milliGRAM(s) Nebulizer two times a day  pantoprazole    Tablet 40 milliGRAM(s) Oral before breakfast  polyethylene glycol 3350 17 Gram(s) Oral daily  QUEtiapine 12.5 milliGRAM(s) Oral at bedtime  rosuvastatin 5 milliGRAM(s) Oral at bedtime  senna 2 Tablet(s) Oral at bedtime  sodium chloride 7% Inhalation 4 milliLiter(s) Inhalation every 12 hours  tiotropium 2.5 MICROgram(s) Inhaler 2 Puff(s) Inhalation daily    MEDICATIONS  (PRN):  dextrose Oral Gel 15 Gram(s) Oral once PRN Blood Glucose LESS THAN 70 milliGRAM(s)/deciliter  dextrose Oral Gel 15 Gram(s) Oral once PRN Blood Glucose LESS THAN 70 milliGRAM(s)/deciliter  magnesium citrate Oral Solution 296 milliLiter(s) Oral every 24 hours PRN Constipation  sertraline 50 milliGRAM(s) Oral daily PRN for anxiety      CAPILLARY BLOOD GLUCOSE      POCT Blood Glucose.: 148 mg/dL (21 Apr 2025 21:51)  POCT Blood Glucose.: 251 mg/dL (21 Apr 2025 17:10)  POCT Blood Glucose.: 160 mg/dL (21 Apr 2025 12:43)  POCT Blood Glucose.: 192 mg/dL (21 Apr 2025 08:51)  POCT Blood Glucose.: 65 mg/dL (21 Apr 2025 08:18)  POCT Blood Glucose.: 53 mg/dL (21 Apr 2025 08:09)    I&O's Summary      PHYSICAL EXAM:  Vital Signs Last 24 Hrs  T(C): 37.3 (22 Apr 2025 06:10), Max: 37.3 (22 Apr 2025 06:10)  T(F): 99.2 (22 Apr 2025 06:10), Max: 99.2 (22 Apr 2025 06:10)  HR: 82 (22 Apr 2025 06:10) (68 - 82)  BP: 132/63 (22 Apr 2025 06:10) (113/64 - 156/76)  BP(mean): --  RR: 18 (22 Apr 2025 06:10) (18 - 18)  SpO2: 95% (22 Apr 2025 06:10) (93% - 97%)    Parameters below as of 22 Apr 2025 06:10  Patient On (Oxygen Delivery Method): room air        GENERAL: NAD  HEAD: timi-orbital bruises noted in the L eye   EYES: L prosthetic eye   HEART: S1, S2, Regular rate and rhythm, no murmurs, rubs, or gallops  LUNGS: Unlabored respirations, clear to auscultation bilaterally  ABDOMEN: Soft, nontender, nondistended  EXTREMITIES: + TTP in b/l LE, no pitting edema   NERVOUS SYSTEM:  A&Ox3      LABS:                        11.5   12.53 )-----------( 296      ( 22 Apr 2025 06:58 )             36.9     04-22    145  |  109[H]  |  15  ----------------------------<  71  3.7   |  24  |  0.78    Ca    9.1      22 Apr 2025 06:58  Phos  2.7     04-22  Mg     2.3     04-22    TPro  6.2  /  Alb  3.6  /  TBili  0.3  /  DBili  x   /  AST  19  /  ALT  31  /  AlkPhos  81  04-22          Urinalysis Basic - ( 22 Apr 2025 06:58 )    Color: x / Appearance: x / SG: x / pH: x  Gluc: 71 mg/dL / Ketone: x  / Bili: x / Urobili: x   Blood: x / Protein: x / Nitrite: x   Leuk Esterase: x / RBC: x / WBC x   Sq Epi: x / Non Sq Epi: x / Bacteria: x          RADIOLOGY & ADDITIONAL TESTS:  Results Reviewed:   Imaging Personally Reviewed:  Electrocardiogram Personally Reviewed:  Tele:

## 2025-04-22 NOTE — CHART NOTE - NSCHARTNOTEFT_GEN_A_CORE
Okay to restart AC stepwise fashion (eliquis, then plavix) in setting of blood-tinged sputum from PRS standpoint. No acute surgical intervention. Monitor for new bleeding.    Plastic and Reconstructive Surgery  [facility]: [id number]  [facility]: [phone]

## 2025-04-22 NOTE — PROGRESS NOTE ADULT - ATTENDING COMMENTS
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 Antonio, Type A dissection s/p bioAVR with Bental procedure (9/2022), severe AS s/p TAVR (9/10/2023), TIA's, DVT, Afib on Eliquis,  Colon cancer S/P resection, colostomy bag, neuropathy, Midline for IV ABX placed 2 weeks ago for chronic "TB like" pneumonia on ertapenmen a/w worsening shortness of breath over the last 7 days. multiple falls, urinary burning, visual hallucination, admitted for further management of asthma/COPD exacerbation, and further eval of new onset visual hallucination. 4/17 Noted new T wave inversion most prominently in V2, troponin/CKMB neg. On 4/20, pt had a fall resulting in L periorbital hematoma with comminuted fracture of the left medial orbital wall.     Now stable. continue to monitor on enhanced supervision. Holding off on starting anticoagulation for now given history of frequent falls. Pending risk/benefit discussion with patient and family.

## 2025-04-22 NOTE — PROVIDER CONTACT NOTE (OTHER) - REASON
Patient noted with blood tinged sputum
pt hr decreasing to 42
lactate 2.6
pt agitated, trying to leave

## 2025-04-22 NOTE — PROGRESS NOTE ADULT - PROBLEM SELECTOR PLAN 9
- Pt takes lantus 22 in AM, 28 at night    Plan:   - check A1C - 8   - place pt back at home dose given likely will have hyperglycemia from steroid - lantus 22 in AM and 28 at night -> switched to lantus 14 AM/14 PM and lantus 10 AM/10PM   - FS premeal and qhs   - nutrition c/s  - pt had multiple episodes of hypoglycemic down titrating her insulin   - Endo c/s, appreciate recs - Pt takes lantus 22 in AM, 28 at night    Plan:   - check A1C - 8   - place pt back at home dose given likely will have hyperglycemia from steroid - lantus 22 in AM and 28 at night -> switched to lantus 14 AM/14 PM and lantus 10 AM/10PM   - FS premeal and qhs   - nutrition c/s  - pt had multiple episodes of hypoglycemic down titrating her insulin   - Endo c/s, appreciate recs - stop AM lantus, continue with lantus 14 units QHS with low ISS

## 2025-04-22 NOTE — PROGRESS NOTE ADULT - PROBLEM SELECTOR PLAN 3
- 4/19 noted new T wave inversion most prominently in V2 (not seen on 4/18 EKG)   - repeat EKG still showed T2 inversion in V2,    - pt currently asymtomatic and VSS     Plan:  - check troponin - 33 -> 23   - check CKMB - 3 -> 2.6   - cards c/s, appreciate recs

## 2025-04-22 NOTE — PROGRESS NOTE ADULT - SUBJECTIVE AND OBJECTIVE BOX
[facility] - Le Jung Pannella, Patel, Sridhar Newsome  Office Number:  [phone]    Cannot obtain interval history from the patient     ROS: negative unless otherwise mentioned.      MEDICATIONS  (STANDING):  albuterol/ipratropium for Nebulization 3 milliLiter(s) Nebulizer every 6 hours  ascorbic acid 500 milliGRAM(s) Oral daily  buDESOnide    Inhalation Suspension 1 milliGRAM(s) Inhalation every 12 hours  chlorhexidine 2% Cloths 1 Application(s) Topical daily  dextrose 5%. 1000 milliLiter(s) (50 mL/Hr) IV Continuous <Continuous>  dextrose 5%. 1000 milliLiter(s) (100 mL/Hr) IV Continuous <Continuous>  dextrose 50% Injectable 25 Gram(s) IV Push once  dextrose 50% Injectable 12.5 Gram(s) IV Push once  dextrose 50% Injectable 25 Gram(s) IV Push once  ferrous    sulfate 325 milliGRAM(s) Oral daily  fluticasone propionate 50 MICROgram(s)/spray Nasal Spray 1 Spray(s) Both Nostrils two times a day  fluticasone propionate/ salmeterol 250-50 MICROgram(s) Diskus 1 Dose(s) Inhalation two times a day  formoterol for nebulization 20 MICROGram(s) Nebulizer two times a day  glucagon  Injectable 1 milliGRAM(s) IntraMuscular once  glycopyrrolate 1 milliGRAM(s) Oral three times a day  insulin glargine Injectable (LANTUS) 14 Unit(s) SubCutaneous at bedtime  insulin lispro (ADMELOG) corrective regimen sliding scale   SubCutaneous three times a day before meals  insulin lispro (ADMELOG) corrective regimen sliding scale   SubCutaneous at bedtime  labetalol 100 milliGRAM(s) Oral every 8 hours  lacosamide 100 milliGRAM(s) Oral two times a day  levETIRAcetam 1000 milliGRAM(s) Oral two times a day  megestrol 20 milliGRAM(s) Oral daily  melatonin 3 milliGRAM(s) Oral at bedtime  methylPREDNISolone 8 milliGRAM(s) Oral daily  mexiletine 200 milliGRAM(s) Oral three times a day  mineral oil 30 milliLiter(s) Oral daily  montelukast 10 milliGRAM(s) Oral at bedtime  multivitamin 1 Tablet(s) Oral daily  mupirocin 2% Nasal 1 Application(s) Both Nostrils two times a day  NIFEdipine XL 60 milliGRAM(s) Oral daily  Ohtuvayre [Ensifentrine] 3mG/2.5mL Nebul 3 milliGRAM(s) 3 milliGRAM(s) Nebulizer two times a day  pantoprazole    Tablet 40 milliGRAM(s) Oral before breakfast  polyethylene glycol 3350 17 Gram(s) Oral daily  QUEtiapine 12.5 milliGRAM(s) Oral at bedtime  rosuvastatin 5 milliGRAM(s) Oral at bedtime  senna 2 Tablet(s) Oral at bedtime  sodium chloride 7% Inhalation 4 milliLiter(s) Inhalation every 12 hours  tiotropium 2.5 MICROgram(s) Inhaler 2 Puff(s) Inhalation daily    MEDICATIONS  (PRN):  dextrose Oral Gel 15 Gram(s) Oral once PRN Blood Glucose LESS THAN 70 milliGRAM(s)/deciliter  dextrose Oral Gel 15 Gram(s) Oral once PRN Blood Glucose LESS THAN 70 milliGRAM(s)/deciliter  magnesium citrate Oral Solution 296 milliLiter(s) Oral every 24 hours PRN Constipation  sertraline 50 milliGRAM(s) Oral daily PRN for anxiety      Allergies    aspirin (Unknown)  codeine (Unknown)  codeine (Short breath)  Valium (Unknown)  cefepime (Short breath (Severe))  shellfish (Anaphylaxis)  penicillin (Anaphylaxis)  cefepime (Anaphylaxis)  penicillin (Unknown)  Percocet (Unknown)  Avelox (Short breath; Pruritus)  Dilaudid (Short breath)  ampicillin (Unknown)  Valium (Short breath)  OxyContin (Unknown)  aspirin (Short breath)  iodine (Short breath; Swelling)  tetanus toxoid (Short breath)  Avelox (Unknown)  meropenem (Unknown)    Intolerances        Vital Signs Last 24 Hrs  T(C): 37.3 (22 Apr 2025 06:10), Max: 37.3 (22 Apr 2025 06:10)  T(F): 99.2 (22 Apr 2025 06:10), Max: 99.2 (22 Apr 2025 06:10)  HR: 82 (22 Apr 2025 06:10) (76 - 82)  BP: 132/63 (22 Apr 2025 06:10) (113/64 - 156/76)  BP(mean): --  RR: 18 (22 Apr 2025 06:10) (18 - 18)  SpO2: 95% (22 Apr 2025 06:10) (93% - 95%)    Parameters below as of 22 Apr 2025 06:10  Patient On (Oxygen Delivery Method): room air        I&O's Summary      ON EXAM:    General: NAD, sleeping  HEENT: Mucous membranes are moist, anicteric  Lungs: Non-labored, breath sounds are clear bilaterally, No wheezing, rales or rhonchi  Cardiovascular: Regular, S1 and S2, +SM  Gastrointestinal: Bowel Sounds present, soft, nontender.   Lymph: No peripheral edema. No lymphadenopathy.  Skin: No rashes or ulcers  Psych: cannot assess    LABS: All Labs Reviewed:                        11.5   12.53 )-----------( 296      ( 22 Apr 2025 06:58 )             36.9                         12.6   10.59 )-----------( 283      ( 21 Apr 2025 07:30 )             40.3                         12.1   11.19 )-----------( 321      ( 20 Apr 2025 07:23 )             39.8     22 Apr 2025 06:58    145    |  109    |  15     ----------------------------<  71     3.7     |  24     |  0.78   21 Apr 2025 07:28    144    |  106    |  16     ----------------------------<  48     3.9     |  24     |  0.77   20 Apr 2025 07:21    144    |  108    |  13     ----------------------------<  44     3.7     |  21     |  0.46     Ca    9.1        22 Apr 2025 06:58  Ca    9.3        21 Apr 2025 07:28  Ca    9.4        20 Apr 2025 07:21  Phos  2.7       22 Apr 2025 06:58  Phos  2.7       21 Apr 2025 07:28  Phos  2.6       20 Apr 2025 07:21  Mg     2.3       22 Apr 2025 06:58  Mg     2.3       21 Apr 2025 07:28  Mg     2.4       20 Apr 2025 07:21    TPro  6.2    /  Alb  3.6    /  TBili  0.3    /  DBili  x      /  AST  19     /  ALT  31     /  AlkPhos  81     22 Apr 2025 06:58  TPro  6.8    /  Alb  3.8    /  TBili  0.4    /  DBili  x      /  AST  24     /  ALT  37     /  AlkPhos  87     21 Apr 2025 07:28  TPro  7.0    /  Alb  3.8    /  TBili  0.3    /  DBili  x      /  AST  23     /  ALT  34     /  AlkPhos  92     20 Apr 2025 07:21          Blood Culture: Organism --  Gram Stain Blood -- Gram Stain   Moderate polymorphonuclear leukocytes per low power field  Few Squamous epithelial cells per low power field  Rare Yeast like cells per oil power field  Specimen Source Sputum Sputum  Culture-Blood --    Organism --  Gram Stain Blood -- Gram Stain --  Specimen Source Blood Blood  Culture-Blood --    Organism --  Gram Stain Blood -- Gram Stain --  Specimen Source Blood Blood  Culture-Blood --    Organism --  Gram Stain Blood -- Gram Stain --  Specimen Source Urine  Culture-Blood --

## 2025-04-22 NOTE — PROVIDER CONTACT NOTE (OTHER) - ACTION/TREATMENT ORDERED:
MD aware, will come see pt
No new order from MD
MD aware, patient's daughter on her way
MD to consult with day team to determine need for tele, no further interventions ordered

## 2025-04-22 NOTE — PROGRESS NOTE ADULT - ASSESSMENT
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 Atnonio, Type A dissection s/p bioAVR with Bental procedure (9/2022), severe AS s/p TAVR (9/10/2023), TIA's, DVT, Afib on Eliquis,  Colon cancer S/P resection, colostomy bag, neuropathy, Midline for IV ABX placed 2 weeks ago for chronic "TB like" pneumonia on ertapenmen (today is supposed to be the last dose at 6pm) presenting today for worsening shortness of breath over the last 7 days. multiple falls, urinary burning, visual hallucination, admitted for further management of asthma/COPD exacerbation, and further eval of new onset visual hallucination. 4/17 Noted new T wave inversion most prominently in V2, troponin/CKMB neg, cards c/s. On 4/20, pt had a fall resulting in L periorbital hematoma with comminuted fracture of the left medial orbital wall.  n/a

## 2025-04-22 NOTE — PROGRESS NOTE ADULT - PROBLEM SELECTOR PLAN 4
LDL goal <55 due to DM/TIA  Pt LDL 56  Statin as noted above  Manage per primary team   F/u levels as out pt

## 2025-04-22 NOTE — PROVIDER CONTACT NOTE (OTHER) - ASSESSMENT
no s/s of distress
pt agitated, attempting to pack her belongings and leave, threatening to call  on staff
no s/s of distress other vitals within normal limits
Patient is not symptomatic

## 2025-04-22 NOTE — PROGRESS NOTE ADULT - PROBLEM SELECTOR PLAN 10
Plan:   - c/w labetalol 100 TID   - increased nifedipine 30 mg qd to 60 mg qd for stricter BP control i.s.o dissection, SBP goal <140

## 2025-04-22 NOTE — PROGRESS NOTE ADULT - ASSESSMENT
Patient is a 73 yo F w/ asthma (chronic methypred 8mg PO daily), bronchiectasis, allergic rhinitis, recurrent PNA, SDB ( on CPAP), tracheomalacia s/p tracheoplasty, tracheobronchomalacia, pAfib (Eliquis), colorectal ca s/p colostomy, aortic dissection s/p ascending aorta repair who p/w AMS and hallucinations x 1 week as well as acute on chronic SOB x 2 weeks. As per patient and daughter at bedside, hallucinations started on Friday. Was instructed to obtain urine sample but was unable to. Patient also had been without her chest vest as awaiting a new one that arrive just earlier this week and so has not been able to do her usual airway clearance regimen. Endorses reduced ET over the past 5 days. Denies any fevers.    CT chest notable for mild bilateral interlobular septal thickening, trace left effusion as well as scattered bilateral sub-6 mm pulmonary nodules    Home Airway clearance regimen: Albuterol q6h -> Chest Vest -> Perforomist BID  -> Pulmicort BID, - > Ohtuvayre 2.5 BID - > HyperSal 7% q12h. Patient also on Breo Ellipta 200 at 1 inhalation QD, Incruse Ellipta 1 puff QD, and on [place]sp[place] airway     #SOB - Likely 2/2 mild COPD/asthma exacerbation  #Acute Asthma exacerbation  #SDB - on CPAP  - c/w Airway clearance regimen as possible Albuterol q6h -> Chest Vest -> Perforomist BID (patient brought in) -> Pulmicort BID, - > Ohtuvayre 2.5 BID (patient brought in) - > HyperSal 7% q12h  - Patient also on Breo Ellipta 200 at 1 inhalation QD, Incruse Ellipta 1 puff QD. Can use Advair/Spiriva while admitted  - Te[place]sp[place] outpatient   - c/w home CPAP and Chest Vest for use  - c/w home steroids 8mg Methylpred daily  - PT/OT, OOB to chair as much as possible  - Patient to follow up with Dr. Allison on discharge    Pulmonary to sign off, please call back if any questions    Shaan Shah MD  Pulmonary & Critical Care

## 2025-04-22 NOTE — PROGRESS NOTE ADULT - PROBLEM SELECTOR PLAN 2
On methylPREDNISolone 8 milliGRAM(s) Oral daily  This is home steroid dose. Do not stop steroid abruply since pt is at risk for developing AI.  Please notify endo team with any further change son steroid doses

## 2025-04-22 NOTE — PROVIDER CONTACT NOTE (OTHER) - BACKGROUND
pt admitted for asthma with acute exacerbation
pt admitted for worsening sob, KRAMER, syncopal episodes
Asthma  Colon cancer  Afib
pt admitted for asthma exacerbation

## 2025-04-22 NOTE — PROGRESS NOTE ADULT - SUBJECTIVE AND OBJECTIVE BOX
DIABETES FOLLOW UP NOTE: Saw pt earlier today    Chief Complaint: Endocrine consult requested for management of DM    INTERVAL HX: Pt stable, reports tolerating POs with BG levels still tightly controlled while on present insulin doses. Pt denies any further SOB. Noted PCA watching pt at time of visit. Per PCA, pt didn't eat breakfast> was sleeping the whole morning.  per POC and 71 per BMP this am. Pt denies any s/sx of hypoglycemia. Now back to home dose of  methylprednisolone 8mg daily. Pt able to recall steroid doses but states having more trouble recalling other meds she takes at home > her daughter got a pill box so she doesn't forget. Also states she takes own insulin but can't recall name/doses at time of visit.       Review of Systems:  General: As above  Cardiovascular: No chest pain, palpitations  Respiratory: No SOB, no cough  GI: No nausea, vomiting, abdominal pain  Endocrine: No polyuria, polydipsia or S&Sx of hypoglycemia    Allergies    aspirin (Unknown)  codeine (Unknown)  codeine (Short breath)  Valium (Unknown)  cefepime (Short breath (Severe))  shellfish (Anaphylaxis)  penicillin (Anaphylaxis)  cefepime (Anaphylaxis)  penicillin (Unknown)  Percocet (Unknown)  Avelox (Short breath; Pruritus)  Dilaudid (Short breath)  ampicillin (Unknown)  Valium (Short breath)  OxyContin (Unknown)  aspirin (Short breath)  iodine (Short breath; Swelling)  tetanus toxoid (Short breath)  Avelox (Unknown)  meropenem (Unknown)    Intolerances      MEDICATIONS    insulin glargine Injectable (LANTUS) 14 Unit(s) SubCutaneous at bedtime  insulin lispro (ADMELOG) corrective regimen sliding scale   SubCutaneous three times a day before meals  insulin lispro (ADMELOG) corrective regimen sliding scale   SubCutaneous at bedtime  methylPREDNISolone 8 milliGRAM(s) Oral daily  rosuvastatin 5 milliGRAM(s) Oral at bedtime      PHYSICAL EXAM:  VITALS: T(C): 36.8 (04-22-25 @ 12:18)  T(F): 98.2 (04-22-25 @ 12:18), Max: 99.2 (04-22-25 @ 06:10)  HR: 68 (04-22-25 @ 12:18) (68 - 82)  BP: 105/54 (04-22-25 @ 12:18) (105/54 - 156/76)  RR:  (18 - 18)  SpO2:  (93% - 96%)  Wt(kg): --  GENERAL: Female laying in bed in NAD  Abdomen: Soft, nontender, non distended  Extremities: Warm, no edema in all 4 exts  NEURO: Alert and able to answer simple questions. Forgetful    LABS:  POCT Blood Glucose.: 135 mg/dL (04-22-25 @ 12:13)  POCT Blood Glucose.: 119 mg/dL (04-22-25 @ 08:45)  POCT Blood Glucose.: 148 mg/dL (04-21-25 @ 21:51)  POCT Blood Glucose.: 251 mg/dL (04-21-25 @ 17:10)  POCT Blood Glucose.: 160 mg/dL (04-21-25 @ 12:43)  POCT Blood Glucose.: 192 mg/dL (04-21-25 @ 08:51)  POCT Blood Glucose.: 65 mg/dL (04-21-25 @ 08:18)  POCT Blood Glucose.: 53 mg/dL (04-21-25 @ 08:09)  POCT Blood Glucose.: 70 mg/dL (04-21-25 @ 06:17)  POCT Blood Glucose.: 74 mg/dL (04-20-25 @ 22:05)  POCT Blood Glucose.: 92 mg/dL (04-20-25 @ 18:48)  POCT Blood Glucose.: 252 mg/dL (04-20-25 @ 14:18)  POCT Blood Glucose.: 91 mg/dL (04-20-25 @ 09:32)  POCT Blood Glucose.: 92 mg/dL (04-20-25 @ 08:47)  POCT Blood Glucose.: 156 mg/dL (04-20-25 @ 08:03)  POCT Blood Glucose.: 50 mg/dL (04-20-25 @ 07:14)  POCT Blood Glucose.: 54 mg/dL (04-20-25 @ 07:13)  POCT Blood Glucose.: 197 mg/dL (04-20-25 @ 02:44)  POCT Blood Glucose.: 57 mg/dL (04-20-25 @ 01:59)  POCT Blood Glucose.: 112 mg/dL (04-19-25 @ 22:41)  POCT Blood Glucose.: 294 mg/dL (04-19-25 @ 17:21)                            11.5   12.53 )-----------( 296      ( 22 Apr 2025 06:58 )             36.9       04-22    145  |  109[H]  |  15  ----------------------------<  71  3.7   |  24  |  0.78    eGFR: 79    Ca    9.1      04-22  Mg     2.3     04-22  Phos  2.7     04-22    TPro  6.2  /  Alb  3.6  /  TBili  0.3  /  DBili  x   /  AST  19  /  ALT  31  /  AlkPhos  81  04-22      eGFR: 79 mL/min/1.73m2 (04-22-25 @ 06:58)        Thyroid Function Tests:  04-18 @ 10:27 TSH 0.62 FreeT4 1.4 T3 -- Anti TPO -- Anti Thyroglobulin Ab -- TSI --      A1C with Estimated Average Glucose Result: 8.0 % (04-18-25 @ 10:27)  A1C with Estimated Average Glucose Result: 7.7 % (02-21-25 @ 08:41)      Estimated Average Glucose: 183 mg/dL (04-18-25 @ 10:27)  Estimated Average Glucose: 174 mg/dL (02-21-25 @ 08:41)        04-18 Chol 124 Direct LDL -- LDL calculated 56 HDL 54 Trig 67

## 2025-04-22 NOTE — PROGRESS NOTE ADULT - PROBLEM SELECTOR PLAN 7
- home meds: eliquis 5mg BID     Plan:   - c/w eliquis 5 mg BID  - c/w labetalol 100 mg TID with hold parameter   - c/w mexiletine 200 mg TID - home meds: eliquis 5mg BID     Plan:   - pt was on eliquis 5 mg BID which was held after her fall resulting in hematoma   - c/w labetalol 100 mg TID with hold parameter   - c/w mexiletine 200 mg TID  - per plastic surgery on 4/22, clear to resume AC  - given pt's multiple falls and hematoma, will discuss with pt and HCP regarding benefits and risk of resuming AC

## 2025-04-22 NOTE — PROGRESS NOTE ADULT - SUBJECTIVE AND OBJECTIVE BOX
infectious diseases progress note:    Patient is a 74y old  Female who presents with a chief complaint of visual hallucination, multiple falls, SOB (22 Apr 2025 07:46)        Asthma with acute exacerbation               Allergies    aspirin (Unknown)  codeine (Unknown)  codeine (Short breath)  Valium (Unknown)  cefepime (Short breath (Severe))  shellfish (Anaphylaxis)  penicillin (Anaphylaxis)  cefepime (Anaphylaxis)  penicillin (Unknown)  Percocet (Unknown)  Avelox (Short breath; Pruritus)  Dilaudid (Short breath)  ampicillin (Unknown)  Valium (Short breath)  OxyContin (Unknown)  aspirin (Short breath)  iodine (Short breath; Swelling)  tetanus toxoid (Short breath)  Avelox (Unknown)  meropenem (Unknown)    Intolerances        ANTIBIOTICS/RELEVANT:  antimicrobials    immunologic:    OTHER:  albuterol/ipratropium for Nebulization 3 milliLiter(s) Nebulizer every 6 hours  ascorbic acid 500 milliGRAM(s) Oral daily  buDESOnide    Inhalation Suspension 1 milliGRAM(s) Inhalation every 12 hours  chlorhexidine 2% Cloths 1 Application(s) Topical daily  dextrose 5%. 1000 milliLiter(s) IV Continuous <Continuous>  dextrose 5%. 1000 milliLiter(s) IV Continuous <Continuous>  dextrose 50% Injectable 25 Gram(s) IV Push once  dextrose 50% Injectable 12.5 Gram(s) IV Push once  dextrose 50% Injectable 25 Gram(s) IV Push once  dextrose Oral Gel 15 Gram(s) Oral once PRN  dextrose Oral Gel 15 Gram(s) Oral once PRN  ferrous    sulfate 325 milliGRAM(s) Oral daily  fluticasone propionate 50 MICROgram(s)/spray Nasal Spray 1 Spray(s) Both Nostrils two times a day  fluticasone propionate/ salmeterol 250-50 MICROgram(s) Diskus 1 Dose(s) Inhalation two times a day  formoterol for nebulization 20 MICROGram(s) Nebulizer two times a day  glucagon  Injectable 1 milliGRAM(s) IntraMuscular once  glycopyrrolate 1 milliGRAM(s) Oral three times a day  insulin glargine Injectable (LANTUS) 14 Unit(s) SubCutaneous at bedtime  insulin lispro (ADMELOG) corrective regimen sliding scale   SubCutaneous three times a day before meals  insulin lispro (ADMELOG) corrective regimen sliding scale   SubCutaneous at bedtime  labetalol 100 milliGRAM(s) Oral every 8 hours  lacosamide 100 milliGRAM(s) Oral two times a day  levETIRAcetam 1000 milliGRAM(s) Oral two times a day  magnesium citrate Oral Solution 296 milliLiter(s) Oral every 24 hours PRN  megestrol 20 milliGRAM(s) Oral daily  melatonin 3 milliGRAM(s) Oral at bedtime  methylPREDNISolone 8 milliGRAM(s) Oral daily  mexiletine 200 milliGRAM(s) Oral three times a day  mineral oil 30 milliLiter(s) Oral daily  montelukast 10 milliGRAM(s) Oral at bedtime  multivitamin 1 Tablet(s) Oral daily  mupirocin 2% Nasal 1 Application(s) Both Nostrils two times a day  NIFEdipine XL 60 milliGRAM(s) Oral daily  Ohtuvayre [Ensifentrine] 3mG/2.5mL Nebul 3 milliGRAM(s) 3 milliGRAM(s) Nebulizer two times a day  pantoprazole    Tablet 40 milliGRAM(s) Oral before breakfast  polyethylene glycol 3350 17 Gram(s) Oral daily  QUEtiapine 12.5 milliGRAM(s) Oral at bedtime  rosuvastatin 5 milliGRAM(s) Oral at bedtime  senna 2 Tablet(s) Oral at bedtime  sertraline 50 milliGRAM(s) Oral daily PRN  sodium chloride 7% Inhalation 4 milliLiter(s) Inhalation every 12 hours  tiotropium 2.5 MICROgram(s) Inhaler 2 Puff(s) Inhalation daily      Objective:  Vital Signs Last 24 Hrs  T(C): 37.3 (22 Apr 2025 06:10), Max: 37.3 (22 Apr 2025 06:10)  T(F): 99.2 (22 Apr 2025 06:10), Max: 99.2 (22 Apr 2025 06:10)  HR: 82 (22 Apr 2025 06:10) (68 - 82)  BP: 132/63 (22 Apr 2025 06:10) (113/64 - 156/76)  BP(mean): --  RR: 18 (22 Apr 2025 06:10) (18 - 18)  SpO2: 95% (22 Apr 2025 06:10) (93% - 97%)    Parameters below as of 22 Apr 2025 06:10  Patient On (Oxygen Delivery Method): room air           Ear/Nose/Throat: no oral lesion, no sinus tenderness on percussion	  Neck:no JVD, no lymphadenopathy, supple  Respiratory: CTA barry  Cardiovascular: S1S2 RRR, no murmurs  Gastrointestinal:soft, (+) BS, no HSM  Extremities:no e/e/c        LABS:                        11.5   12.53 )-----------( 296      ( 22 Apr 2025 06:58 )             36.9     04-22    145  |  109[H]  |  15  ----------------------------<  71  3.7   |  24  |  0.78    Ca    9.1      22 Apr 2025 06:58  Phos  2.7     04-22  Mg     2.3     04-22    TPro  6.2  /  Alb  3.6  /  TBili  0.3  /  DBili  x   /  AST  19  /  ALT  31  /  AlkPhos  81  04-22      Urinalysis Basic - ( 22 Apr 2025 06:58 )    Color: x / Appearance: x / SG: x / pH: x  Gluc: 71 mg/dL / Ketone: x  / Bili: x / Urobili: x   Blood: x / Protein: x / Nitrite: x   Leuk Esterase: x / RBC: x / WBC x   Sq Epi: x / Non Sq Epi: x / Bacteria: x          MICROBIOLOGY:    RECENT CULTURES:  04-18 @ 11:25 Sputum Sputum       Moderate polymorphonuclear leukocytes per low power field  Few Squamous epithelial cells per low power field  Rare Yeast like cells per oil power field           Commensal val consistent with body site    04-17 @ 22:55 Blood Blood                No growth at 4 days    04-17 @ 22:45 Blood Blood                No growth at 4 days    04-17 @ 19:50 Urine                <10,000 CFU/mL Normal Urogenital Val          RESPIRATORY CULTURES:              RADIOLOGY & ADDITIONAL STUDIES:        Pager [phone]  After 5 pm/weekends or if no response :[phone]

## 2025-04-22 NOTE — PROGRESS NOTE ADULT - ASSESSMENT
74y Female with pmhx of Epilepsy on Keppra, COPD, asthma, DM2, tracheomalacia s/p tracheloplasty, HTN, Hx of dissection of descending thoracic aorta, hx of aortic aneurysm, Type B dissection (7/2024), Type A dissection s/p bioAVR with Bental procedure (9/2022), severe AS s/p TAVR (9/10/2023), TIA's, DVT, Afib on Eliquis,  Colon cancer S/P resection, colostomy bag, neuropathy, admitted for worsening shortness of breath, multiple fals    #Abnormal EKG  - EKG this admission shows a wide QRS rhythm that is likely regularized AF with a nonspecific IVCD and nonspecific T wave changes.  Has had junctional rhythm in the past as well.  Does not seem to be symptomamtic from this  - No evidence of ACS  - Troponin negative  - Patient denied chest pain   - No further inpatient cardiac workup anticipated at this time  - Unclear per chart review why patient is on mexilitine.  Need to investigate further. Continue for now.     #PAF  - AC on hold due to frequent falls  - Patient with Fall 4/20 in the AM due to hallucinations    #AS  - S/p TAVR 2023    #HTN  - Bp labile  - Check orthostatics  - Given history of frequent falls, would be lenient with BP Control   - Continue labetalol and nifedipine for now.

## 2025-04-22 NOTE — PROGRESS NOTE ADULT - PROBLEM SELECTOR PLAN 12
- severe AS s/p TAVR (9/10/2023), Type A dissection s/p bioAVR with Bental procedure (9/2022)    Plan:   - CTM   - hold plavix i.s.o fall 4/20, resume once cleared by plastic and ophthalmology

## 2025-04-23 ENCOUNTER — TRANSCRIPTION ENCOUNTER (OUTPATIENT)
Age: 75
End: 2025-04-23

## 2025-04-23 LAB
ALBUMIN SERPL ELPH-MCNC: 3.5 G/DL — SIGNIFICANT CHANGE UP (ref 3.3–5)
ALP SERPL-CCNC: 74 U/L — SIGNIFICANT CHANGE UP (ref 40–120)
ALT FLD-CCNC: 25 U/L — SIGNIFICANT CHANGE UP (ref 10–45)
ANION GAP SERPL CALC-SCNC: 14 MMOL/L — SIGNIFICANT CHANGE UP (ref 5–17)
APTT BLD: 29.2 SEC — SIGNIFICANT CHANGE UP (ref 26.1–36.8)
AST SERPL-CCNC: 16 U/L — SIGNIFICANT CHANGE UP (ref 10–40)
BASOPHILS # BLD AUTO: 0.02 K/UL — SIGNIFICANT CHANGE UP (ref 0–0.2)
BASOPHILS NFR BLD AUTO: 0.2 % — SIGNIFICANT CHANGE UP (ref 0–2)
BILIRUB SERPL-MCNC: 0.3 MG/DL — SIGNIFICANT CHANGE UP (ref 0.2–1.2)
BUN SERPL-MCNC: 14 MG/DL — SIGNIFICANT CHANGE UP (ref 7–23)
CALCIUM SERPL-MCNC: 8.9 MG/DL — SIGNIFICANT CHANGE UP (ref 8.4–10.5)
CHLORIDE SERPL-SCNC: 105 MMOL/L — SIGNIFICANT CHANGE UP (ref 96–108)
CK MB CFR SERPL CALC: 1.9 NG/ML — SIGNIFICANT CHANGE UP (ref 0–3.8)
CO2 SERPL-SCNC: 23 MMOL/L — SIGNIFICANT CHANGE UP (ref 22–31)
CREAT SERPL-MCNC: 0.73 MG/DL — SIGNIFICANT CHANGE UP (ref 0.5–1.3)
CULTURE RESULTS: SIGNIFICANT CHANGE UP
CULTURE RESULTS: SIGNIFICANT CHANGE UP
EGFR: 85 ML/MIN/1.73M2 — SIGNIFICANT CHANGE UP
EGFR: 85 ML/MIN/1.73M2 — SIGNIFICANT CHANGE UP
EOSINOPHIL # BLD AUTO: 0.15 K/UL — SIGNIFICANT CHANGE UP (ref 0–0.5)
EOSINOPHIL NFR BLD AUTO: 1.4 % — SIGNIFICANT CHANGE UP (ref 0–6)
GLUCOSE BLDC GLUCOMTR-MCNC: 154 MG/DL — HIGH (ref 70–99)
GLUCOSE BLDC GLUCOMTR-MCNC: 157 MG/DL — HIGH (ref 70–99)
GLUCOSE BLDC GLUCOMTR-MCNC: 185 MG/DL — HIGH (ref 70–99)
GLUCOSE BLDC GLUCOMTR-MCNC: 330 MG/DL — HIGH (ref 70–99)
GLUCOSE SERPL-MCNC: 139 MG/DL — HIGH (ref 70–99)
HCT VFR BLD CALC: 37.2 % — SIGNIFICANT CHANGE UP (ref 34.5–45)
HGB BLD-MCNC: 11.4 G/DL — LOW (ref 11.5–15.5)
IMM GRANULOCYTES NFR BLD AUTO: 0.8 % — SIGNIFICANT CHANGE UP (ref 0–0.9)
INR BLD: 1.12 RATIO — SIGNIFICANT CHANGE UP (ref 0.85–1.16)
LYMPHOCYTES # BLD AUTO: 1.75 K/UL — SIGNIFICANT CHANGE UP (ref 1–3.3)
LYMPHOCYTES # BLD AUTO: 16.5 % — SIGNIFICANT CHANGE UP (ref 13–44)
MAGNESIUM SERPL-MCNC: 2.2 MG/DL — SIGNIFICANT CHANGE UP (ref 1.6–2.6)
MCHC RBC-ENTMCNC: 24.1 PG — LOW (ref 27–34)
MCHC RBC-ENTMCNC: 30.6 G/DL — LOW (ref 32–36)
MCV RBC AUTO: 78.6 FL — LOW (ref 80–100)
MONOCYTES # BLD AUTO: 1.4 K/UL — HIGH (ref 0–0.9)
MONOCYTES NFR BLD AUTO: 13.2 % — SIGNIFICANT CHANGE UP (ref 2–14)
NEUTROPHILS # BLD AUTO: 7.2 K/UL — SIGNIFICANT CHANGE UP (ref 1.8–7.4)
NEUTROPHILS NFR BLD AUTO: 67.9 % — SIGNIFICANT CHANGE UP (ref 43–77)
NRBC BLD AUTO-RTO: 0 /100 WBCS — SIGNIFICANT CHANGE UP (ref 0–0)
PHOSPHATE SERPL-MCNC: 2.3 MG/DL — LOW (ref 2.5–4.5)
PLATELET # BLD AUTO: 249 K/UL — SIGNIFICANT CHANGE UP (ref 150–400)
POTASSIUM SERPL-MCNC: 4.1 MMOL/L — SIGNIFICANT CHANGE UP (ref 3.5–5.3)
POTASSIUM SERPL-SCNC: 4.1 MMOL/L — SIGNIFICANT CHANGE UP (ref 3.5–5.3)
PROT SERPL-MCNC: 6.1 G/DL — SIGNIFICANT CHANGE UP (ref 6–8.3)
PROTHROM AB SERPL-ACNC: 12.9 SEC — SIGNIFICANT CHANGE UP (ref 9.9–13.4)
RBC # BLD: 4.73 M/UL — SIGNIFICANT CHANGE UP (ref 3.8–5.2)
RBC # FLD: 17.2 % — HIGH (ref 10.3–14.5)
SODIUM SERPL-SCNC: 142 MMOL/L — SIGNIFICANT CHANGE UP (ref 135–145)
SPECIMEN SOURCE: SIGNIFICANT CHANGE UP
SPECIMEN SOURCE: SIGNIFICANT CHANGE UP
TROPONIN T, HIGH SENSITIVITY RESULT: 31 NG/L — SIGNIFICANT CHANGE UP (ref 0–51)
WBC # BLD: 10.6 K/UL — HIGH (ref 3.8–10.5)
WBC # FLD AUTO: 10.6 K/UL — HIGH (ref 3.8–10.5)

## 2025-04-23 PROCEDURE — 99232 SBSQ HOSP IP/OBS MODERATE 35: CPT

## 2025-04-23 PROCEDURE — 99232 SBSQ HOSP IP/OBS MODERATE 35: CPT | Mod: GC

## 2025-04-23 PROCEDURE — G0545: CPT

## 2025-04-23 RX ORDER — SODIUM PHOSPHATE,DIBASIC DIHYD
15 POWDER (GRAM) MISCELLANEOUS ONCE
Refills: 0 | Status: COMPLETED | OUTPATIENT
Start: 2025-04-23 | End: 2025-04-23

## 2025-04-23 RX ADMIN — QUETIAPINE FUMARATE 12.5 MILLIGRAM(S): 25 TABLET ORAL at 21:38

## 2025-04-23 RX ADMIN — LEVETIRACETAM 1000 MILLIGRAM(S): 10 INJECTION, SOLUTION INTRAVENOUS at 05:27

## 2025-04-23 RX ADMIN — Medication 500 MILLIGRAM(S): at 11:41

## 2025-04-23 RX ADMIN — FLUTICASONE PROPIONATE 1 SPRAY(S): 50 SPRAY, METERED NASAL at 05:36

## 2025-04-23 RX ADMIN — Medication 1 APPLICATION(S): at 11:46

## 2025-04-23 RX ADMIN — GLYCOPYRROLATE 1 MILLIGRAM(S): 0.2 INJECTION INTRAMUSCULAR; INTRAVENOUS at 05:28

## 2025-04-23 RX ADMIN — Medication 2 TABLET(S): at 22:12

## 2025-04-23 RX ADMIN — LACOSAMIDE 100 MILLIGRAM(S): 150 TABLET, FILM COATED ORAL at 18:28

## 2025-04-23 RX ADMIN — MEXILETINE HYDROCHLORIDE 200 MILLIGRAM(S): 250 CAPSULE ORAL at 05:26

## 2025-04-23 RX ADMIN — INSULIN LISPRO 2 UNIT(S): 100 INJECTION, SOLUTION INTRAVENOUS; SUBCUTANEOUS at 17:43

## 2025-04-23 RX ADMIN — Medication 3 MILLIGRAM(S): at 22:13

## 2025-04-23 RX ADMIN — MONTELUKAST SODIUM 10 MILLIGRAM(S): 10 TABLET ORAL at 22:13

## 2025-04-23 RX ADMIN — Medication 4 MILLILITER(S): at 18:45

## 2025-04-23 RX ADMIN — TIOTROPIUM BROMIDE INHALATION SPRAY 2 PUFF(S): 3.12 SPRAY, METERED RESPIRATORY (INHALATION) at 12:17

## 2025-04-23 RX ADMIN — Medication 1 DOSE(S): at 18:45

## 2025-04-23 RX ADMIN — Medication 1 TABLET(S): at 11:41

## 2025-04-23 RX ADMIN — LACOSAMIDE 100 MILLIGRAM(S): 150 TABLET, FILM COATED ORAL at 05:27

## 2025-04-23 RX ADMIN — FLUTICASONE PROPIONATE 1 SPRAY(S): 50 SPRAY, METERED NASAL at 18:28

## 2025-04-23 RX ADMIN — INSULIN GLARGINE-YFGN 12 UNIT(S): 100 INJECTION, SOLUTION SUBCUTANEOUS at 22:14

## 2025-04-23 RX ADMIN — MUPIROCIN CALCIUM 1 APPLICATION(S): 20 CREAM TOPICAL at 18:28

## 2025-04-23 RX ADMIN — GLYCOPYRROLATE 1 MILLIGRAM(S): 0.2 INJECTION INTRAMUSCULAR; INTRAVENOUS at 13:51

## 2025-04-23 RX ADMIN — IPRATROPIUM BROMIDE AND ALBUTEROL SULFATE 3 MILLILITER(S): .5; 2.5 SOLUTION RESPIRATORY (INHALATION) at 05:35

## 2025-04-23 RX ADMIN — MUPIROCIN CALCIUM 1 APPLICATION(S): 20 CREAM TOPICAL at 05:34

## 2025-04-23 RX ADMIN — Medication 40 MILLIGRAM(S): at 05:27

## 2025-04-23 RX ADMIN — IPRATROPIUM BROMIDE AND ALBUTEROL SULFATE 3 MILLILITER(S): .5; 2.5 SOLUTION RESPIRATORY (INHALATION) at 18:27

## 2025-04-23 RX ADMIN — Medication 1 DOSE(S): at 05:37

## 2025-04-23 RX ADMIN — Medication 30 MILLILITER(S): at 12:13

## 2025-04-23 RX ADMIN — Medication 63.75 MILLIMOLE(S): at 11:41

## 2025-04-23 RX ADMIN — INSULIN LISPRO 2 UNIT(S): 100 INJECTION, SOLUTION INTRAVENOUS; SUBCUTANEOUS at 08:39

## 2025-04-23 RX ADMIN — Medication 4 MILLILITER(S): at 05:33

## 2025-04-23 RX ADMIN — INSULIN LISPRO 1: 100 INJECTION, SOLUTION INTRAVENOUS; SUBCUTANEOUS at 08:31

## 2025-04-23 RX ADMIN — FORMOTEROL FUMARATE DIHYDRATE 20 MICROGRAM(S): 20 SOLUTION RESPIRATORY (INHALATION) at 18:45

## 2025-04-23 RX ADMIN — Medication 20 MILLIGRAM(S): at 12:14

## 2025-04-23 RX ADMIN — GLYCOPYRROLATE 1 MILLIGRAM(S): 0.2 INJECTION INTRAMUSCULAR; INTRAVENOUS at 22:12

## 2025-04-23 RX ADMIN — ROSUVASTATIN CALCIUM 5 MILLIGRAM(S): 20 TABLET, FILM COATED ORAL at 22:13

## 2025-04-23 RX ADMIN — Medication 60 MILLIGRAM(S): at 22:13

## 2025-04-23 RX ADMIN — MEXILETINE HYDROCHLORIDE 200 MILLIGRAM(S): 250 CAPSULE ORAL at 22:13

## 2025-04-23 RX ADMIN — BUDESONIDE 1 MILLIGRAM(S): 0.25 SUSPENSION RESPIRATORY (INHALATION) at 18:27

## 2025-04-23 RX ADMIN — LEVETIRACETAM 1000 MILLIGRAM(S): 10 INJECTION, SOLUTION INTRAVENOUS at 18:28

## 2025-04-23 RX ADMIN — METHYLPREDNISOLONE ACETATE 8 MILLIGRAM(S): 80 INJECTION, SUSPENSION INTRA-ARTICULAR; INTRALESIONAL; INTRAMUSCULAR; SOFT TISSUE at 05:26

## 2025-04-23 RX ADMIN — LABETALOL HYDROCHLORIDE 100 MILLIGRAM(S): 200 TABLET, FILM COATED ORAL at 22:13

## 2025-04-23 RX ADMIN — INSULIN LISPRO 1: 100 INJECTION, SOLUTION INTRAVENOUS; SUBCUTANEOUS at 12:06

## 2025-04-23 RX ADMIN — POLYETHYLENE GLYCOL 3350 17 GRAM(S): 17 POWDER, FOR SOLUTION ORAL at 12:13

## 2025-04-23 RX ADMIN — IPRATROPIUM BROMIDE AND ALBUTEROL SULFATE 3 MILLILITER(S): .5; 2.5 SOLUTION RESPIRATORY (INHALATION) at 11:41

## 2025-04-23 RX ADMIN — MEXILETINE HYDROCHLORIDE 200 MILLIGRAM(S): 250 CAPSULE ORAL at 13:51

## 2025-04-23 RX ADMIN — FORMOTEROL FUMARATE DIHYDRATE 20 MICROGRAM(S): 20 SOLUTION RESPIRATORY (INHALATION) at 05:35

## 2025-04-23 RX ADMIN — Medication 325 MILLIGRAM(S): at 11:42

## 2025-04-23 RX ADMIN — INSULIN LISPRO 4: 100 INJECTION, SOLUTION INTRAVENOUS; SUBCUTANEOUS at 17:43

## 2025-04-23 NOTE — PROGRESS NOTE ADULT - ASSESSMENT
The patient is 75 y/o woman with PMH of epilepsy, COPD, DM, bronchiectasis, tracheomalacia s/p tracheloplasty, HTN, hx of dissection of descending thoracic aorta, hx of aortic aneurysm, type B dissection (7/2024), type A dissection s/p bio AVR with Bentall procedure (9/2022), severe AS s/p TAVR (9/10/2023), TIA's, Afib on Eliquis, colon cancer s/p colostomy, who presents to the ED for fever. Patient reports having cough for past 2-3 days, associated with fever. Patient endorses increased weakness and fatigue. Patient has hx of recurrent PNA  Doxycycline prescribed by PCP. Afebrile in ED. Work up shows: WBC 10.58, Cr 0.77, Lactate 1.3, Procalcitonin 0.08.       - Noted numerous allergies listed to PCN,   cefepime etc. Known hx of aortic aneurysm and dissection. (contra-indication to FQs). Seizure disorder - caution with carbapenems needed.  pt completing a 14 day course of invanz for an ESBL proteus in the sputum given via a pICC line    current admission does not demonstrate a new pna but she has had visual hallucinations    no fever or chills      Impression  # COPD    s/p Invanz for 14 days   Recent fall ( hs of seizures)  Underlying colon cancer,  AVR, aortic aneurysm,   CT noted fracture and hematoma of sinus     Recommendations:  -Would favor to monitor off of antibiotics  stable off ab      MANY ALLERGIES LISTED INCLUDING CEPHALOSPORINS AND PENICILLINS     -Supplemental oxygen as needed to maintain SpO2 88-92%

## 2025-04-23 NOTE — CHART NOTE - NSCHARTNOTEFT_GEN_A_CORE
Search Terms: Shirley Bloom, 1950Search Date: 04/23/2025 16:39:37 PM  Searching on behalf of: [address] [place] [address]  The Drug Utilization Report below displays all of the controlled substance prescriptions, if any, that your patient has filled in the last twelve months. The information displayed on this report is compiled from pharmacy submissions to the Department, and accurately reflects the information as submitted by the pharmacies.    This report was requested by: Hien Chavez | Reference #: [id number]    Practitioner Count: 2  Pharmacy Count: 2  Current Opioid Prescriptions: 0  Current Benzodiazepine Prescriptions: 0  Current Stimulant Prescriptions: 0      Patient Demographic Information (PDI)       PDI	First Name	Last Name	Birth Date	Gender	Street Address	[facility]  A	Shirley Bloom	1950	Female	[address] [facility]	[address]  B	Shirley Bloom	1950	Female	[address] [facility]	[address]    Prescription Information      PDI Filter:    PDI	Current Rx	Drug Type	Rx Written	Rx Dispensed	Drug	Quantity	Days Supply	Prescriber Name	Prescriber ISRAEL #	Payment Method	Dispenser  A	N	O	01/28/2025	03/24/2025	tramadol hcl 50 mg tablet	45	15	Bon Samuels Jr, MD	[id number]	Medicare	[facility] [id number]  A	N		02/28/2025	03/18/2025	lacosamide 100 mg tablet	50	30	Proteasa, Emmy	[id number]	Medicare	[facility] [id number]  A	N		02/28/2025	03/17/2025	lacosamide 100 mg tablet	10	5	Proteasa, Emmy	[id number]	Medicare	[facility] [id number]  A	N		09/16/2024	02/12/2025	lacosamide 100 mg tablet	50	30	Proteasa, Emmy	[id number]	Medicare	[id number]  A	N		09/16/2024	02/10/2025	lacosamide 100 mg tablet	10	5	Proteasa, Emmy	[id number]	Medicare	[id number]  A	N	O	01/28/2025	01/28/2025	tramadol hcl 50 mg tablet	45	15	Bon Samuels Jr, MD	[id number]	Medicare	[facility] [id number]  A	N		09/16/2024	01/12/2025	lacosamide 100 mg tablet	60	30	Proteasa, Emmy	[id number]	Medicare	[id number]  A	N	O	11/01/2024	01/12/2025	tramadol hcl 50 mg tablet	7	2	CacBon mccray Jr, MD	[id number]	Medicare	[id number]  A	N		09/16/2024	12/06/2024	lacosamide 100 mg tablet	60	30	ProteasEmmy lund	[id number]	Medicare	[id number]  A	N	O	11/01/2024	11/09/2024	tramadol hcl 50 mg tablet	23	7	CacBon mccray Jr, MD	[id number]	Medicare	[id number]  B	N		09/16/2024	10/28/2024	lacosamide 100 mg tablet	60	30	ProteasEmmy lund	[id number]	Medicare	[id number]  B	N		09/16/2024	09/19/2024	lacosamide 100 mg tablet	60	30	ProteasEmmy lund	[id number]	Medicare	[id number]

## 2025-04-23 NOTE — PROGRESS NOTE ADULT - SUBJECTIVE AND OBJECTIVE BOX
infectious diseases progress note:    Patient is a 74y old  Female who presents with a chief complaint of visual hallucination, multiple falls, SOB (23 Apr 2025 07:36)        Asthma with acute exacerbation             Allergies    aspirin (Unknown)  codeine (Unknown)  codeine (Short breath)  Valium (Unknown)  cefepime (Short breath (Severe))  shellfish (Anaphylaxis)  penicillin (Anaphylaxis)  cefepime (Anaphylaxis)  penicillin (Unknown)  Percocet (Unknown)  Avelox (Short breath; Pruritus)  Dilaudid (Short breath)  ampicillin (Unknown)  Valium (Short breath)  OxyContin (Unknown)  aspirin (Short breath)  iodine (Short breath; Swelling)  tetanus toxoid (Short breath)  Avelox (Unknown)  meropenem (Unknown)    Intolerances        ANTIBIOTICS/RELEVANT:  antimicrobials    immunologic:    OTHER:  albuterol/ipratropium for Nebulization 3 milliLiter(s) Nebulizer every 6 hours  ascorbic acid 500 milliGRAM(s) Oral daily  buDESOnide    Inhalation Suspension 1 milliGRAM(s) Inhalation every 12 hours  chlorhexidine 2% Cloths 1 Application(s) Topical daily  dextrose 5%. 1000 milliLiter(s) IV Continuous <Continuous>  dextrose 5%. 1000 milliLiter(s) IV Continuous <Continuous>  dextrose 50% Injectable 25 Gram(s) IV Push once  dextrose 50% Injectable 12.5 Gram(s) IV Push once  dextrose 50% Injectable 25 Gram(s) IV Push once  dextrose Oral Gel 15 Gram(s) Oral once PRN  dextrose Oral Gel 15 Gram(s) Oral once PRN  ferrous    sulfate 325 milliGRAM(s) Oral daily  fluticasone propionate 50 MICROgram(s)/spray Nasal Spray 1 Spray(s) Both Nostrils two times a day  fluticasone propionate/ salmeterol 250-50 MICROgram(s) Diskus 1 Dose(s) Inhalation two times a day  formoterol for nebulization 20 MICROGram(s) Nebulizer two times a day  glucagon  Injectable 1 milliGRAM(s) IntraMuscular once  glycopyrrolate 1 milliGRAM(s) Oral three times a day  insulin glargine Injectable (LANTUS) 12 Unit(s) SubCutaneous at bedtime  insulin lispro (ADMELOG) corrective regimen sliding scale   SubCutaneous three times a day before meals  insulin lispro (ADMELOG) corrective regimen sliding scale   SubCutaneous at bedtime  insulin lispro Injectable (ADMELOG) 2 Unit(s) SubCutaneous three times a day with meals  labetalol 100 milliGRAM(s) Oral every 8 hours  lacosamide 100 milliGRAM(s) Oral two times a day  levETIRAcetam 1000 milliGRAM(s) Oral two times a day  magnesium citrate Oral Solution 296 milliLiter(s) Oral every 24 hours PRN  megestrol 20 milliGRAM(s) Oral daily  melatonin 3 milliGRAM(s) Oral at bedtime  methylPREDNISolone 8 milliGRAM(s) Oral daily  mexiletine 200 milliGRAM(s) Oral three times a day  mineral oil 30 milliLiter(s) Oral daily  montelukast 10 milliGRAM(s) Oral at bedtime  multivitamin 1 Tablet(s) Oral daily  mupirocin 2% Nasal 1 Application(s) Both Nostrils two times a day  NIFEdipine XL 60 milliGRAM(s) Oral daily  Ohtuvayre [Ensifentrine] 3mG/2.5mL Nebul 3 milliGRAM(s) 3 milliGRAM(s) Nebulizer two times a day  pantoprazole    Tablet 40 milliGRAM(s) Oral before breakfast  polyethylene glycol 3350 17 Gram(s) Oral daily  QUEtiapine 12.5 milliGRAM(s) Oral at bedtime  rosuvastatin 5 milliGRAM(s) Oral at bedtime  senna 2 Tablet(s) Oral at bedtime  sertraline 50 milliGRAM(s) Oral daily PRN  sodium chloride 7% Inhalation 4 milliLiter(s) Inhalation every 12 hours  tiotropium 2.5 MICROgram(s) Inhaler 2 Puff(s) Inhalation daily      Objective:  Vital Signs Last 24 Hrs  T(C): 37.4 (23 Apr 2025 05:40), Max: 37.4 (23 Apr 2025 05:40)  T(F): 99.3 (23 Apr 2025 05:40), Max: 99.3 (23 Apr 2025 05:40)  HR: 53 (23 Apr 2025 05:40) (52 - 80)  BP: 127/75 (23 Apr 2025 05:40) (98/64 - 145/54)  BP(mean): --  RR: 18 (23 Apr 2025 05:40) (18 - 19)  SpO2: 98% (23 Apr 2025 05:40) (95% - 98%)    Parameters below as of 23 Apr 2025 05:40  Patient On (Oxygen Delivery Method): room air           Ear/Nose/Throat: no oral lesion, no sinus tenderness on percussion	  Neck:no JVD, no lymphadenopathy, supple  Respiratory: CTA barry  Cardiovascular: S1S2 RRR, no murmurs  Gastrointestinal:soft, (+) BS, no HSM  Extremities:no e/e/c        LABS:                        11.4   10.60 )-----------( 249      ( 23 Apr 2025 07:01 )             37.2     04-23    142  |  105  |  14  ----------------------------<  139[H]  4.1   |  23  |  0.73    Ca    8.9      23 Apr 2025 07:00  Phos  2.3     04-23  Mg     2.2     04-23    TPro  6.1  /  Alb  3.5  /  TBili  0.3  /  DBili  x   /  AST  16  /  ALT  25  /  AlkPhos  74  04-23    PT/INR - ( 23 Apr 2025 07:01 )   PT: 12.9 sec;   INR: 1.12 ratio         PTT - ( 23 Apr 2025 07:01 )  PTT:29.2 sec  Urinalysis Basic - ( 23 Apr 2025 07:00 )    Color: x / Appearance: x / SG: x / pH: x  Gluc: 139 mg/dL / Ketone: x  / Bili: x / Urobili: x   Blood: x / Protein: x / Nitrite: x   Leuk Esterase: x / RBC: x / WBC x   Sq Epi: x / Non Sq Epi: x / Bacteria: x          MICROBIOLOGY:    RECENT CULTURES:  04-18 @ 11:25 Sputum Sputum       Moderate polymorphonuclear leukocytes per low power field  Few Squamous epithelial cells per low power field  Rare Yeast like cells per oil power field           Commensal val consistent with body site    04-17 @ 22:55 Blood Blood                No growth at 5 days    04-17 @ 22:45 Blood Blood                No growth at 5 days    04-17 @ 19:50 Urine                <10,000 CFU/mL Normal Urogenital Val          RESPIRATORY CULTURES:              RADIOLOGY & ADDITIONAL STUDIES:        Pager [phone]  After 5 pm/weekends or if no response :[phone]

## 2025-04-23 NOTE — PROGRESS NOTE ADULT - SUBJECTIVE AND OBJECTIVE BOX
*******************************  Hien Chavez, PGY-1  Internal Medicine  Contact via [employer] TEAMS    *******************************    PROGRESS NOTE:     Patient is a 74y old  Female who presents with a chief complaint of visual hallucination, multiple falls, SOB (22 Apr 2025 17:14)      INTERVAL EVENTS: No acute overnight events.     SUBJECTIVE: Patient seen and examined at bedside. This morning, the patient is comfortable and doing well. No acute complaints. Denies fevers, chills, N/V/D, chest pain, SOB, abdominal pain.    MEDICATIONS  (STANDING):  albuterol/ipratropium for Nebulization 3 milliLiter(s) Nebulizer every 6 hours  ascorbic acid 500 milliGRAM(s) Oral daily  buDESOnide    Inhalation Suspension 1 milliGRAM(s) Inhalation every 12 hours  chlorhexidine 2% Cloths 1 Application(s) Topical daily  dextrose 5%. 1000 milliLiter(s) (100 mL/Hr) IV Continuous <Continuous>  dextrose 5%. 1000 milliLiter(s) (50 mL/Hr) IV Continuous <Continuous>  dextrose 50% Injectable 25 Gram(s) IV Push once  dextrose 50% Injectable 12.5 Gram(s) IV Push once  dextrose 50% Injectable 25 Gram(s) IV Push once  ferrous    sulfate 325 milliGRAM(s) Oral daily  fluticasone propionate 50 MICROgram(s)/spray Nasal Spray 1 Spray(s) Both Nostrils two times a day  fluticasone propionate/ salmeterol 250-50 MICROgram(s) Diskus 1 Dose(s) Inhalation two times a day  formoterol for nebulization 20 MICROGram(s) Nebulizer two times a day  glucagon  Injectable 1 milliGRAM(s) IntraMuscular once  glycopyrrolate 1 milliGRAM(s) Oral three times a day  insulin glargine Injectable (LANTUS) 12 Unit(s) SubCutaneous at bedtime  insulin lispro (ADMELOG) corrective regimen sliding scale   SubCutaneous three times a day before meals  insulin lispro (ADMELOG) corrective regimen sliding scale   SubCutaneous at bedtime  insulin lispro Injectable (ADMELOG) 2 Unit(s) SubCutaneous three times a day with meals  labetalol 100 milliGRAM(s) Oral every 8 hours  lacosamide 100 milliGRAM(s) Oral two times a day  levETIRAcetam 1000 milliGRAM(s) Oral two times a day  megestrol 20 milliGRAM(s) Oral daily  melatonin 3 milliGRAM(s) Oral at bedtime  methylPREDNISolone 8 milliGRAM(s) Oral daily  mexiletine 200 milliGRAM(s) Oral three times a day  mineral oil 30 milliLiter(s) Oral daily  montelukast 10 milliGRAM(s) Oral at bedtime  multivitamin 1 Tablet(s) Oral daily  mupirocin 2% Nasal 1 Application(s) Both Nostrils two times a day  NIFEdipine XL 60 milliGRAM(s) Oral daily  Ohtuvayre [Ensifentrine] 3mG/2.5mL Nebul 3 milliGRAM(s) 3 milliGRAM(s) Nebulizer two times a day  pantoprazole    Tablet 40 milliGRAM(s) Oral before breakfast  polyethylene glycol 3350 17 Gram(s) Oral daily  QUEtiapine 12.5 milliGRAM(s) Oral at bedtime  rosuvastatin 5 milliGRAM(s) Oral at bedtime  senna 2 Tablet(s) Oral at bedtime  sodium chloride 7% Inhalation 4 milliLiter(s) Inhalation every 12 hours  tiotropium 2.5 MICROgram(s) Inhaler 2 Puff(s) Inhalation daily    MEDICATIONS  (PRN):  dextrose Oral Gel 15 Gram(s) Oral once PRN Blood Glucose LESS THAN 70 milliGRAM(s)/deciliter  dextrose Oral Gel 15 Gram(s) Oral once PRN Blood Glucose LESS THAN 70 milliGRAM(s)/deciliter  magnesium citrate Oral Solution 296 milliLiter(s) Oral every 24 hours PRN Constipation  sertraline 50 milliGRAM(s) Oral daily PRN for anxiety      CAPILLARY BLOOD GLUCOSE      POCT Blood Glucose.: 208 mg/dL (22 Apr 2025 21:27)  POCT Blood Glucose.: 193 mg/dL (22 Apr 2025 17:39)  POCT Blood Glucose.: 135 mg/dL (22 Apr 2025 12:13)  POCT Blood Glucose.: 119 mg/dL (22 Apr 2025 08:45)    I&O's Summary    22 Apr 2025 07:01  -  23 Apr 2025 07:00  --------------------------------------------------------  IN: 120 mL / OUT: 0 mL / NET: 120 mL        PHYSICAL EXAM:  Vital Signs Last 24 Hrs  T(C): 37.4 (23 Apr 2025 05:40), Max: 37.4 (23 Apr 2025 05:40)  T(F): 99.3 (23 Apr 2025 05:40), Max: 99.3 (23 Apr 2025 05:40)  HR: 53 (23 Apr 2025 05:40) (52 - 80)  BP: 127/75 (23 Apr 2025 05:40) (98/64 - 145/54)  BP(mean): --  RR: 18 (23 Apr 2025 05:40) (18 - 19)  SpO2: 98% (23 Apr 2025 05:40) (95% - 98%)    Parameters below as of 23 Apr 2025 05:40  Patient On (Oxygen Delivery Method): room air        GENERAL: NAD, lying in bed comfortably  HEAD: Atraumatic, normocephalic  EYES: EOMI, PERRLA, conjunctiva and sclera clear  ENT: Moist mucous membranes  NECK: Supple, no JVD  HEART: S1, S2, Regular rate and rhythm, no murmurs, rubs, or gallops  LUNGS: Unlabored respirations, clear to auscultation bilaterally, no crackles, wheezing, or rhonchi  ABDOMEN: Soft, nontender, nondistended, +BS  EXTREMITIES: 2+ peripheral pulses bilaterally. No clubbing, cyanosis, or edema  NERVOUS SYSTEM:  A&Ox3, no focal deficits   SKIN: No rashes or lesions    LABS:                        11.4   10.60 )-----------( 249      ( 23 Apr 2025 07:01 )             37.2     04-22    145  |  109[H]  |  15  ----------------------------<  71  3.7   |  24  |  0.78    Ca    9.1      22 Apr 2025 06:58  Phos  2.7     04-22  Mg     2.3     04-22    TPro  6.2  /  Alb  3.6  /  TBili  0.3  /  DBili  x   /  AST  19  /  ALT  31  /  AlkPhos  81  04-22    PT/INR - ( 23 Apr 2025 07:01 )   PT: 12.9 sec;   INR: 1.12 ratio         PTT - ( 23 Apr 2025 07:01 )  PTT:29.2 sec      Urinalysis Basic - ( 22 Apr 2025 06:58 )    Color: x / Appearance: x / SG: x / pH: x  Gluc: 71 mg/dL / Ketone: x  / Bili: x / Urobili: x   Blood: x / Protein: x / Nitrite: x   Leuk Esterase: x / RBC: x / WBC x   Sq Epi: x / Non Sq Epi: x / Bacteria: x          RADIOLOGY & ADDITIONAL TESTS:  Results Reviewed:   Imaging Personally Reviewed:  Electrocardiogram Personally Reviewed:  Tele: *******************************  Hien Chavez, PGY-1  Internal Medicine  Contact via [employer] TEAMS    *******************************    PROGRESS NOTE:     Patient is a 74y old  Female who presents with a chief complaint of visual hallucination, multiple falls, SOB (22 Apr 2025 17:14)      INTERVAL EVENTS: No acute overnight events.     SUBJECTIVE: Patient seen and examined at bedside. This morning, the patient is comfortable and doing well. No acute complaints. Denies fevers, chills, N/V/D, chest pain, SOB, abdominal pain.    MEDICATIONS  (STANDING):  albuterol/ipratropium for Nebulization 3 milliLiter(s) Nebulizer every 6 hours  ascorbic acid 500 milliGRAM(s) Oral daily  buDESOnide    Inhalation Suspension 1 milliGRAM(s) Inhalation every 12 hours  chlorhexidine 2% Cloths 1 Application(s) Topical daily  dextrose 5%. 1000 milliLiter(s) (100 mL/Hr) IV Continuous <Continuous>  dextrose 5%. 1000 milliLiter(s) (50 mL/Hr) IV Continuous <Continuous>  dextrose 50% Injectable 25 Gram(s) IV Push once  dextrose 50% Injectable 12.5 Gram(s) IV Push once  dextrose 50% Injectable 25 Gram(s) IV Push once  ferrous    sulfate 325 milliGRAM(s) Oral daily  fluticasone propionate 50 MICROgram(s)/spray Nasal Spray 1 Spray(s) Both Nostrils two times a day  fluticasone propionate/ salmeterol 250-50 MICROgram(s) Diskus 1 Dose(s) Inhalation two times a day  formoterol for nebulization 20 MICROGram(s) Nebulizer two times a day  glucagon  Injectable 1 milliGRAM(s) IntraMuscular once  glycopyrrolate 1 milliGRAM(s) Oral three times a day  insulin glargine Injectable (LANTUS) 12 Unit(s) SubCutaneous at bedtime  insulin lispro (ADMELOG) corrective regimen sliding scale   SubCutaneous three times a day before meals  insulin lispro (ADMELOG) corrective regimen sliding scale   SubCutaneous at bedtime  insulin lispro Injectable (ADMELOG) 2 Unit(s) SubCutaneous three times a day with meals  labetalol 100 milliGRAM(s) Oral every 8 hours  lacosamide 100 milliGRAM(s) Oral two times a day  levETIRAcetam 1000 milliGRAM(s) Oral two times a day  megestrol 20 milliGRAM(s) Oral daily  melatonin 3 milliGRAM(s) Oral at bedtime  methylPREDNISolone 8 milliGRAM(s) Oral daily  mexiletine 200 milliGRAM(s) Oral three times a day  mineral oil 30 milliLiter(s) Oral daily  montelukast 10 milliGRAM(s) Oral at bedtime  multivitamin 1 Tablet(s) Oral daily  mupirocin 2% Nasal 1 Application(s) Both Nostrils two times a day  NIFEdipine XL 60 milliGRAM(s) Oral daily  Ohtuvayre [Ensifentrine] 3mG/2.5mL Nebul 3 milliGRAM(s) 3 milliGRAM(s) Nebulizer two times a day  pantoprazole    Tablet 40 milliGRAM(s) Oral before breakfast  polyethylene glycol 3350 17 Gram(s) Oral daily  QUEtiapine 12.5 milliGRAM(s) Oral at bedtime  rosuvastatin 5 milliGRAM(s) Oral at bedtime  senna 2 Tablet(s) Oral at bedtime  sodium chloride 7% Inhalation 4 milliLiter(s) Inhalation every 12 hours  tiotropium 2.5 MICROgram(s) Inhaler 2 Puff(s) Inhalation daily    MEDICATIONS  (PRN):  dextrose Oral Gel 15 Gram(s) Oral once PRN Blood Glucose LESS THAN 70 milliGRAM(s)/deciliter  dextrose Oral Gel 15 Gram(s) Oral once PRN Blood Glucose LESS THAN 70 milliGRAM(s)/deciliter  magnesium citrate Oral Solution 296 milliLiter(s) Oral every 24 hours PRN Constipation  sertraline 50 milliGRAM(s) Oral daily PRN for anxiety      CAPILLARY BLOOD GLUCOSE      POCT Blood Glucose.: 208 mg/dL (22 Apr 2025 21:27)  POCT Blood Glucose.: 193 mg/dL (22 Apr 2025 17:39)  POCT Blood Glucose.: 135 mg/dL (22 Apr 2025 12:13)  POCT Blood Glucose.: 119 mg/dL (22 Apr 2025 08:45)    I&O's Summary    22 Apr 2025 07:01  -  23 Apr 2025 07:00  --------------------------------------------------------  IN: 120 mL / OUT: 0 mL / NET: 120 mL        PHYSICAL EXAM:  Vital Signs Last 24 Hrs  T(C): 37.4 (23 Apr 2025 05:40), Max: 37.4 (23 Apr 2025 05:40)  T(F): 99.3 (23 Apr 2025 05:40), Max: 99.3 (23 Apr 2025 05:40)  HR: 53 (23 Apr 2025 05:40) (52 - 80)  BP: 127/75 (23 Apr 2025 05:40) (98/64 - 145/54)  BP(mean): --  RR: 18 (23 Apr 2025 05:40) (18 - 19)  SpO2: 98% (23 Apr 2025 05:40) (95% - 98%)    Parameters below as of 23 Apr 2025 05:40  Patient On (Oxygen Delivery Method): room air      GENERAL: NAD  HEAD: timi-orbital bruises noted in the L eye   EYES: L prosthetic eye   HEART: S1, S2, Regular rate and rhythm, no murmurs, rubs, or gallops  LUNGS: Unlabored respirations, clear to auscultation bilaterally  ABDOMEN: Soft, nontender, nondistended  EXTREMITIES: + TTP in b/l LE, no pitting edema   NERVOUS SYSTEM:  A&Ox3    LABS:                        11.4   10.60 )-----------( 249      ( 23 Apr 2025 07:01 )             37.2     04-22    145  |  109[H]  |  15  ----------------------------<  71  3.7   |  24  |  0.78    Ca    9.1      22 Apr 2025 06:58  Phos  2.7     04-22  Mg     2.3     04-22    TPro  6.2  /  Alb  3.6  /  TBili  0.3  /  DBili  x   /  AST  19  /  ALT  31  /  AlkPhos  81  04-22    PT/INR - ( 23 Apr 2025 07:01 )   PT: 12.9 sec;   INR: 1.12 ratio         PTT - ( 23 Apr 2025 07:01 )  PTT:29.2 sec      Urinalysis Basic - ( 22 Apr 2025 06:58 )    Color: x / Appearance: x / SG: x / pH: x  Gluc: 71 mg/dL / Ketone: x  / Bili: x / Urobili: x   Blood: x / Protein: x / Nitrite: x   Leuk Esterase: x / RBC: x / WBC x   Sq Epi: x / Non Sq Epi: x / Bacteria: x          RADIOLOGY & ADDITIONAL TESTS:  Results Reviewed:   Imaging Personally Reviewed:  Electrocardiogram Personally Reviewed:  Tele:

## 2025-04-23 NOTE — PROGRESS NOTE ADULT - PROBLEM SELECTOR PLAN 8
Plan:   - hold eliquis 5 mg BID i.s.o fall on 4/20   - will resume eliquis 5mg BID once cleared by plastic and ophthalmology  - per plastic surgery on 4/22, clear to resume AC  - given pt's multiple falls and hematoma, will discuss with pt and HCP regarding benefits and risk of resuming AC

## 2025-04-23 NOTE — PROGRESS NOTE ADULT - ASSESSMENT
74y Female with pmhx of Epilepsy on Keppra, COPD, asthma, DM2, tracheomalacia s/p tracheloplasty, HTN, Hx of dissection of descending thoracic aorta, hx of aortic aneurysm, Type B dissection (7/2024), Type A dissection s/p bioAVR with Bental procedure (9/2022), severe AS s/p TAVR (9/10/2023), TIA's, DVT, Afib on Eliquis,  Colon cancer S/P resection, colostomy bag, neuropathy, admitted for worsening shortness of breath, multiple fals    #Abnormal EKG  - EKG this admission shows a wide QRS rhythm that is likely regularized AF with a nonspecific IVCD and nonspecific T wave changes.  Has had junctional rhythm in the past as well.  Does not seem to be symptomamtic from this  - No evidence of ACS  - Troponin negative  - Patient denied chest pain   - No further inpatient cardiac workup anticipated at this time  - Unclear per chart review why patient is on mexilitine.  Need to investigate further. Continue for now.     #PAF  - AC on hold due to frequent falls  - Patient with Fall 4/20 in the AM due to hallucinations. Does not seem to be an ideal candidate for long term AC    #AS  - S/p TAVR 2023  - would resume ASA or Plavix if no contraindication    #HTN  - BP stable  - Given history of frequent falls, would be lenient with BP Control   - Continue labetalol and nifedipine for now.

## 2025-04-23 NOTE — PROGRESS NOTE ADULT - PROBLEM SELECTOR PLAN 2
On methylPREDNISolone 8 milliGRAM(s) Oral daily  This is home steroid dose. Do not stop steroid abruptly since pt is at risk for developing AI.  Please notify endo team with any further change son steroid doses

## 2025-04-23 NOTE — PROGRESS NOTE ADULT - ATTENDING COMMENTS
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 Antonio, Type A dissection s/p bioAVR with Bental procedure (9/2022), severe AS s/p TAVR (9/10/2023), TIA's, DVT, Afib on Eliquis,  Colon cancer S/P resection, colostomy bag, neuropathy, Midline for IV ABX placed 2 weeks ago for chronic "TB like" pneumonia on ertapenmen a/w worsening shortness of breath over the last 7 days. multiple falls, urinary burning, visual hallucination, admitted for further management of asthma/COPD exacerbation, and further eval of new onset visual hallucination. 4/17 Noted new T wave inversion most prominently in V2, troponin/CKMB neg. On 4/20, pt had a fall resulting in L periorbital hematoma with comminuted fracture of the left medial orbital wall.     Now stable. Holding off on starting anticoagulation for now given history of frequent falls. D/c planning.

## 2025-04-23 NOTE — PROGRESS NOTE ADULT - PROBLEM SELECTOR PLAN 1
-Test BG ac and hs. Q6H while NPO  - C/w insulin Glargine to 12 units QHS   - C/w Admelog 2 units ac meals. HOLD IF NOT EATING!!  - C/w low correctional scales TID with meals and QHS  - Needs RD consult. Noted poor PO intake in last 2 days requiring low meal time insulin doses. On Megace  - Per primary team, discharging pt on present steroid dose    Discharge recs:  Pt having trouble recalling insulin she takes at home and states she manages insulin independently but can't verbalize how.   Daughter to administer Lantus insulin 12 units q hs.   No need for meal time insulin at this time. GFR> 60. Consider Januvia 100mg daily OR Tradjenta 5mg daily   Make sure pt has Rx for all DM supplies and insulin/ DM meds.  Continue freestyle chin 3 sensor CGM to monitor BG at home. Pt states she has reader  Patient to follow up with endocrinologist Dr. Panchal- appt scheduled for 6/10 1:15pm. Michelle to contact endo if BG persistently >200s x 2 days or BG <70 for med adjustments.   - Needs Optho/ renal/cardiac follow up

## 2025-04-23 NOTE — PROGRESS NOTE ADULT - SUBJECTIVE AND OBJECTIVE BOX
[facility] - [place], Jim Pablo, Sridhar Newsome  Office Number:  [phone]    Patient resting comfortably in bed in NAD.    Denies any chest pain or SOB this AM, more talkative     ROS: negative unless otherwise mentioned.    Telemetry:off    MEDICATIONS  (STANDING):  albuterol/ipratropium for Nebulization 3 milliLiter(s) Nebulizer every 6 hours  ascorbic acid 500 milliGRAM(s) Oral daily  buDESOnide    Inhalation Suspension 1 milliGRAM(s) Inhalation every 12 hours  chlorhexidine 2% Cloths 1 Application(s) Topical daily  dextrose 5%. 1000 milliLiter(s) (50 mL/Hr) IV Continuous <Continuous>  dextrose 5%. 1000 milliLiter(s) (100 mL/Hr) IV Continuous <Continuous>  dextrose 50% Injectable 25 Gram(s) IV Push once  dextrose 50% Injectable 12.5 Gram(s) IV Push once  dextrose 50% Injectable 25 Gram(s) IV Push once  ferrous    sulfate 325 milliGRAM(s) Oral daily  fluticasone propionate 50 MICROgram(s)/spray Nasal Spray 1 Spray(s) Both Nostrils two times a day  fluticasone propionate/ salmeterol 250-50 MICROgram(s) Diskus 1 Dose(s) Inhalation two times a day  formoterol for nebulization 20 MICROGram(s) Nebulizer two times a day  glucagon  Injectable 1 milliGRAM(s) IntraMuscular once  glycopyrrolate 1 milliGRAM(s) Oral three times a day  insulin glargine Injectable (LANTUS) 12 Unit(s) SubCutaneous at bedtime  insulin lispro (ADMELOG) corrective regimen sliding scale   SubCutaneous three times a day before meals  insulin lispro (ADMELOG) corrective regimen sliding scale   SubCutaneous at bedtime  insulin lispro Injectable (ADMELOG) 2 Unit(s) SubCutaneous three times a day with meals  labetalol 100 milliGRAM(s) Oral every 8 hours  lacosamide 100 milliGRAM(s) Oral two times a day  levETIRAcetam 1000 milliGRAM(s) Oral two times a day  megestrol 20 milliGRAM(s) Oral daily  melatonin 3 milliGRAM(s) Oral at bedtime  methylPREDNISolone 8 milliGRAM(s) Oral daily  mexiletine 200 milliGRAM(s) Oral three times a day  mineral oil 30 milliLiter(s) Oral daily  montelukast 10 milliGRAM(s) Oral at bedtime  multivitamin 1 Tablet(s) Oral daily  mupirocin 2% Nasal 1 Application(s) Both Nostrils two times a day  NIFEdipine XL 60 milliGRAM(s) Oral daily  Ohtuvayre [Ensifentrine] 3mG/2.5mL Nebul 3 milliGRAM(s) 3 milliGRAM(s) Nebulizer two times a day  pantoprazole    Tablet 40 milliGRAM(s) Oral before breakfast  polyethylene glycol 3350 17 Gram(s) Oral daily  QUEtiapine 12.5 milliGRAM(s) Oral at bedtime  rosuvastatin 5 milliGRAM(s) Oral at bedtime  senna 2 Tablet(s) Oral at bedtime  sodium chloride 7% Inhalation 4 milliLiter(s) Inhalation every 12 hours  sodium phosphate 15 milliMole(s)/250 mL IVPB 15 milliMole(s) IV Intermittent once  tiotropium 2.5 MICROgram(s) Inhaler 2 Puff(s) Inhalation daily    MEDICATIONS  (PRN):  dextrose Oral Gel 15 Gram(s) Oral once PRN Blood Glucose LESS THAN 70 milliGRAM(s)/deciliter  dextrose Oral Gel 15 Gram(s) Oral once PRN Blood Glucose LESS THAN 70 milliGRAM(s)/deciliter  magnesium citrate Oral Solution 296 milliLiter(s) Oral every 24 hours PRN Constipation  sertraline 50 milliGRAM(s) Oral daily PRN for anxiety      Allergies    aspirin (Unknown)  codeine (Unknown)  codeine (Short breath)  Valium (Unknown)  cefepime (Short breath (Severe))  shellfish (Anaphylaxis)  penicillin (Anaphylaxis)  cefepime (Anaphylaxis)  penicillin (Unknown)  Percocet (Unknown)  Avelox (Short breath; Pruritus)  Dilaudid (Short breath)  ampicillin (Unknown)  Valium (Short breath)  OxyContin (Unknown)  aspirin (Short breath)  iodine (Short breath; Swelling)  tetanus toxoid (Short breath)  Avelox (Unknown)  meropenem (Unknown)    Intolerances        Vital Signs Last 24 Hrs  T(C): 37.4 (23 Apr 2025 05:40), Max: 37.4 (23 Apr 2025 05:40)  T(F): 99.3 (23 Apr 2025 05:40), Max: 99.3 (23 Apr 2025 05:40)  HR: 53 (23 Apr 2025 05:40) (52 - 72)  BP: 127/75 (23 Apr 2025 05:40) (98/64 - 145/54)  BP(mean): --  RR: 18 (23 Apr 2025 05:40) (18 - 19)  SpO2: 98% (23 Apr 2025 05:40) (95% - 98%)    Parameters below as of 23 Apr 2025 05:40  Patient On (Oxygen Delivery Method): room air        I&O's Summary    22 Apr 2025 07:01  -  23 Apr 2025 07:00  --------------------------------------------------------  IN: 120 mL / OUT: 0 mL / NET: 120 mL        ON EXAM:    General: NAD, awake and alert, oriented x 3  HEENT: Mucous membranes are moist, anicteric  Lungs: Non-labored, breath sounds are clear bilaterally, No wheezing, rales or rhonchi  Cardiovascular: Regular, S1 and S2, no murmurs, rubs, or gallops  Gastrointestinal: Bowel Sounds present, soft, nontender.   Lymph: No peripheral edema. No lymphadenopathy.  Skin: No rashes or ulcers  Psych:  Mood & affect appropriate    LABS: All Labs Reviewed:                        11.4   10.60 )-----------( 249      ( 23 Apr 2025 07:01 )             37.2                         11.5   12.53 )-----------( 296      ( 22 Apr 2025 06:58 )             36.9                         12.6   10.59 )-----------( 283      ( 21 Apr 2025 07:30 )             40.3     23 Apr 2025 07:00    142    |  105    |  14     ----------------------------<  139    4.1     |  23     |  0.73   22 Apr 2025 06:58    145    |  109    |  15     ----------------------------<  71     3.7     |  24     |  0.78   21 Apr 2025 07:28    144    |  106    |  16     ----------------------------<  48     3.9     |  24     |  0.77     Ca    8.9        23 Apr 2025 07:00  Ca    9.1        22 Apr 2025 06:58  Ca    9.3        21 Apr 2025 07:28  Phos  2.3       23 Apr 2025 07:00  Phos  2.7       22 Apr 2025 06:58  Phos  2.7       21 Apr 2025 07:28  Mg     2.2       23 Apr 2025 07:00  Mg     2.3       22 Apr 2025 06:58  Mg     2.3       21 Apr 2025 07:28    TPro  6.1    /  Alb  3.5    /  TBili  0.3    /  DBili  x      /  AST  16     /  ALT  25     /  AlkPhos  74     23 Apr 2025 07:00  TPro  6.2    /  Alb  3.6    /  TBili  0.3    /  DBili  x      /  AST  19     /  ALT  31     /  AlkPhos  81     22 Apr 2025 06:58  TPro  6.8    /  Alb  3.8    /  TBili  0.4    /  DBili  x      /  AST  24     /  ALT  37     /  AlkPhos  87     21 Apr 2025 07:28    PT/INR - ( 23 Apr 2025 07:01 )   PT: 12.9 sec;   INR: 1.12 ratio         PTT - ( 23 Apr 2025 07:01 )  PTT:29.2 sec      Blood Culture: Organism --  Gram Stain Blood -- Gram Stain   Moderate polymorphonuclear leukocytes per low power field  Few Squamous epithelial cells per low power field  Rare Yeast like cells per oil power field  Specimen Source Sputum Sputum  Culture-Blood --

## 2025-04-23 NOTE — DISCHARGE NOTE PROVIDER - NSDCCPCAREPLAN_GEN_ALL_CORE_FT
PRINCIPAL DISCHARGE DIAGNOSIS  Diagnosis: Acute asthma exacerbation  Assessment and Plan of Treatment:       SECONDARY DISCHARGE DIAGNOSES  Diagnosis: Pneumonia  Assessment and Plan of Treatment:      PRINCIPAL DISCHARGE DIAGNOSIS  Diagnosis: Acute asthma exacerbation  Assessment and Plan of Treatment:       SECONDARY DISCHARGE DIAGNOSES  Diagnosis: Visual hallucination  Assessment and Plan of Treatment:     Diagnosis: Fall  Assessment and Plan of Treatment:     Diagnosis: Diabetes  Assessment and Plan of Treatment:      PRINCIPAL DISCHARGE DIAGNOSIS  Diagnosis: Acute asthma exacerbation  Assessment and Plan of Treatment: You presented to the hospital with worsening shortness of breath and was admitted to the hospital for asthma/COPD exacerbation management. You were given inhalers and steroids which help your breathing status returned to baseline. Pulmonary evaluated you in the hospital and your steroids was titrated back to your home dose. You have remain stable and can now be dishcarged with outpatient PCP, pulmonary follow up.   If you start to experience worsening of current symptoms or new symptoms including chest pain, shortness of breath, wheezing, nausea, voming, abdominal pain, please seek medical attention.      SECONDARY DISCHARGE DIAGNOSES  Diagnosis: Visual hallucination  Assessment and Plan of Treatment: You presented to the hospital with visual hallucination. You underwent an MRI which showed multiple chronic microhemorrhages in both cerebral hemispheres with additional chronic microhemorrhages in rocky and cerebellum. There's no acute changes found on the MRI. Neurology and psychiatry also evaluated you in the hospital. 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 You were started on seroquel 12.5 mg once daily at bedtime per neurology and psychiatry recommendation. You have reported improvement in visual hallucination and can now be discharged with outpatient follow up with neurologist Dr Horner ([phone]) and PCP.   Please continue to take seroquel 12.5 mg once daily at bedtime and follow up with your PCP for QTC monitoring.   If you start to experience worsening of current symptoms or new symptoms including shortness of breath, chest pain, new hallucinations, worsening hallucinations, any auditory, tactile hallucinations, headache, dizziness, blurry vision, please seek medical attention.       Diagnosis: Fall  Assessment and Plan of Treatment: You fall in the hospital as a response to visual hallucination. CT was performed to rule out any abnormalities and found a left periorbital hematoma with a comminuted   fracture of the left medial orbital wall and resultant hemorrhagic air-fluid levels in the left ethmoid sinus, extending into the left sphenoid sinus. Plastic surgery and ophthalmology evaluated you in the hospital and recommended no acute intervention. Please follow the following instructions (1) do not blow your nose; (2) do not place objects in your nose; (3) do not engage in strenuous activities or activities in which facial trauma may be possible; and (4) sneeze with an open mouth. You have remain stable and can now be discharged with outaptient follow up with ophthalmology and plastic surgery in a week.   Please use artificial tears to both eyes every 4 hours.   If you start to experience worsening of current symptoms or new symptoms including worsening hematoma, eye pain, shortness of breath, chest pain, new hallucinations, worsening hallucinations, any auditory, tactile hallucinations, headache, dizziness, blurry vision, please seek medical attention.       Diagnosis: Diabetes  Assessment and Plan of Treatment: You were noted to have hypoglycemic episode in the hospital with your home diabetes regime. Endocrinology evaluated you in the hospital to further optimize your diabetes regime. Your sugar has remain stable after adjustment and can now be discharged with outpatient endocrinology follow up.   Please start use lantus 12 units once daily at bedtime. Please take januvia 100 mg once daily. Please monitor your sugar at home using the freestyle chin 3 reader. Please follow up with Dr. Panchal at the appointment scheduled for 6/10 1:15 pm. Please contact endocrinology office is sugar is persistently above 200 x2 days or less than 70 for medication adjustment.   If you start to experience worsening of current symptoms or new symptoms including nausea, vomiting, urinating excessively, pain with urination, low sugar, dizziness, please seek medical attention.     PRINCIPAL DISCHARGE DIAGNOSIS  Diagnosis: Acute asthma exacerbation  Assessment and Plan of Treatment: You presented to the hospital with worsening shortness of breath and was admitted to the hospital for asthma/COPD exacerbation management. You were given inhalers and steroids which help your breathing status returned to baseline. Pulmonary evaluated you in the hospital and your steroids was titrated back to your home dose. You have remain stable and can now be dishcarged with outpatient PCP, pulmonary follow up.   If you start to experience worsening of current symptoms or new symptoms including chest pain, shortness of breath, wheezing, nausea, voming, abdominal pain, please seek medical attention.      SECONDARY DISCHARGE DIAGNOSES  Diagnosis: Visual hallucination  Assessment and Plan of Treatment: You presented to the hospital with visual hallucination. You underwent an MRI which showed multiple chronic microhemorrhages in both cerebral hemispheres with additional chronic microhemorrhages in rocky and cerebellum. There's no acute changes found on the MRI. Neurology and psychiatry also evaluated you in the hospital. 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 You were started on seroquel 12.5 mg once daily at bedtime per neurology and psychiatry recommendation. You have reported improvement in visual hallucination and can now be discharged with outpatient follow up with neurologist Dr Horner ([phone]) and PCP.   Please continue to take seroquel 12.5 mg once daily at bedtime and follow up with your PCP for QTC monitoring.   If you start to experience worsening of current symptoms or new symptoms including shortness of breath, chest pain, new hallucinations, worsening hallucinations, any auditory, tactile hallucinations, headache, dizziness, blurry vision, please seek medical attention.       Diagnosis: Fall  Assessment and Plan of Treatment: You fall in the hospital as a response to visual hallucination. CT was performed to rule out any abnormalities and found a left periorbital hematoma with a comminuted   fracture of the left medial orbital wall and resultant hemorrhagic air-fluid levels in the left ethmoid sinus, extending into the left sphenoid sinus. Plastic surgery and ophthalmology evaluated you in the hospital and recommended no acute intervention. Please follow the following instructions (1) do not blow your nose; (2) do not place objects in your nose; (3) do not engage in strenuous activities or activities in which facial trauma may be possible; and (4) sneeze with an open mouth. You have remain stable and can now be discharged with outaptient follow up with ophthalmology and plastic surgery in a week.   Please use artificial tears to both eyes every 4 hours.   If you start to experience worsening of current symptoms or new symptoms including worsening hematoma, eye pain, shortness of breath, chest pain, new hallucinations, worsening hallucinations, any auditory, tactile hallucinations, headache, dizziness, blurry vision, please seek medical attention.       Diagnosis: Diabetes  Assessment and Plan of Treatment: You were noted to have hypoglycemic episode in the hospital with your home diabetes regime. Endocrinology evaluated you in the hospital to further optimize your diabetes regime. Your sugar has remain stable after adjustment and can now be discharged with outpatient endocrinology follow up.   Please start use lantus 12 units once daily at bedtime. Please take januvia 100 mg once daily. Please monitor your sugar at home using the freestyle chin 3 reader. Please follow up with Dr. Panchal at the appointment scheduled for 6/10 1:15 pm. Please contact endocrinology office is sugar is persistently above 200 x2 days or less than 70 for medication adjustment.   If you start to experience worsening of current symptoms or new symptoms including nausea, vomiting, urinating excessively, pain with urination, low sugar, dizziness, please seek medical attention.    Diagnosis: T wave inversion in EKG  Assessment and Plan of Treatment: You were found to have some nonspecific T wave inversions during this hospitalization. Your cardiac enzymes have been negative and cardiologist evaluated you, ruled out any concern for heart attack. You have remain symptoms free and can now be discharged with outpatient follow up with cardiology in 2 weeks.   If you start to experience worsening of current symptoms or new symptoms including nausea, vomiting, chest pain, dizziness, numbness in extremities, abdominal pain, please seek medical attention.     PRINCIPAL DISCHARGE DIAGNOSIS  Diagnosis: Acute asthma exacerbation  Assessment and Plan of Treatment: You presented to the hospital with worsening shortness of breath and was admitted to the hospital for asthma/COPD exacerbation management. You were given inhalers and steroids which help your breathing status returned to baseline. Pulmonary evaluated you in the hospital and your steroids was titrated back to your home dose. You have remain stable and can now be dishcarged with outpatient PCP, pulmonary follow up.   If you start to experience worsening of current symptoms or new symptoms including chest pain, shortness of breath, wheezing, nausea, voming, abdominal pain, please seek medical attention.      SECONDARY DISCHARGE DIAGNOSES  Diagnosis: Visual hallucination  Assessment and Plan of Treatment: You presented to the hospital with visual hallucination. You underwent an MRI which showed multiple chronic microhemorrhages in both cerebral hemispheres with additional chronic microhemorrhages in rocky and cerebellum. There's no acute changes found on the MRI. Neurology and psychiatry also evaluated you in the hospital. 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 You were started on seroquel 12.5 mg once daily at bedtime per neurology and psychiatry recommendation. You have reported improvement in visual hallucination and can now be discharged with outpatient follow up with neurologist Dr Horner ([phone]) and PCP.   Please continue to take seroquel 12.5 mg once daily at bedtime and follow up with your PCP for QTC monitoring.   If you start to experience worsening of current symptoms or new symptoms including shortness of breath, chest pain, new hallucinations, worsening hallucinations, any auditory, tactile hallucinations, headache, dizziness, blurry vision, please seek medical attention.       Diagnosis: Fall  Assessment and Plan of Treatment: You fall in the hospital as a response to visual hallucination. CT was performed to rule out any abnormalities and found a left periorbital hematoma with a comminuted   fracture of the left medial orbital wall and resultant hemorrhagic air-fluid levels in the left ethmoid sinus, extending into the left sphenoid sinus. Plastic surgery and ophthalmology evaluated you in the hospital and recommended no acute intervention. Please follow the following instructions (1) do not blow your nose; (2) do not place objects in your nose; (3) do not engage in strenuous activities or activities in which facial trauma may be possible; and (4) sneeze with an open mouth. You have remain stable and can now be discharged with outaptient follow up with ophthalmology and plastic surgery in a week.   Please use artificial tears to both eyes every 4 hours.   If you start to experience worsening of current symptoms or new symptoms including worsening hematoma, eye pain, shortness of breath, chest pain, new hallucinations, worsening hallucinations, any auditory, tactile hallucinations, headache, dizziness, blurry vision, please seek medical attention.       Diagnosis: Diabetes  Assessment and Plan of Treatment: You were noted to have hypoglycemic episode in the hospital with your home diabetes regime. Endocrinology evaluated you in the hospital to further optimize your diabetes regime. Your sugar has remain stable after adjustment and can now be discharged with outpatient endocrinology follow up.   Please start use lantus 12 units once daily at bedtime. Please take januvia 100 mg once daily. Please monitor your sugar at home using the freestyle chin 3 reader. Please follow up with Dr. Panchal at the appointment scheduled for 6/10 1:15 pm. Please contact endocrinology office is sugar is persistently above 200 x2 days or less than 70 for medication adjustment.   If you start to experience worsening of current symptoms or new symptoms including nausea, vomiting, urinating excessively, pain with urination, low sugar, dizziness, please seek medical attention.    Diagnosis: T wave inversion in EKG  Assessment and Plan of Treatment: You were found to have some nonspecific T wave inversions during this hospitalization. Your cardiac enzymes have been negative and cardiologist evaluated you, ruled out any concern for heart attack. You have remain symptoms free and can now be discharged with outpatient follow up with cardiology in 2 weeks.   If you start to experience worsening of current symptoms or new symptoms including nausea, vomiting, chest pain, dizziness, numbness in extremities, abdominal pain, please seek medical attention.    Diagnosis: Hypertension  Assessment and Plan of Treatment: Your blood pressure was noted to be elevated during admission. Your nifedipine dosage is increased to 60 mg once daily. 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 If you heart rate drops below 60s, you can skip a dose of labetalol, continue to take nifedipine. Please follow up with your PCP regularly for monitoring.   If you start to experience worsening of current symptoms or new symptoms including nausea, vomiting, chest pain, dizziness, numbness in extremities, abdominal pain, please seek medical attention.     PRINCIPAL DISCHARGE DIAGNOSIS  Diagnosis: Acute asthma exacerbation  Assessment and Plan of Treatment: You presented to the hospital with worsening shortness of breath and was admitted to the hospital for asthma/COPD exacerbation management. You were given inhalers and steroids which help your breathing status returned to baseline. Pulmonary evaluated you in the hospital and your steroids was titrated back to your home dose. You have remain stable and can now be dishcarged with outpatient PCP, pulmonary follow up.   In addition, you underwent a CT of the chest in the hospital which revealed scattered bilateral sub-6 mm pulmonary nodules may be reactive in nature. Recommend follow-up chest CT in 8-12 weeks to evaluate for   resolution. Please follow up with your PCP or pulmonary doctor for a repeat CT Chest in the interval mentioned above.   If you start to experience worsening of current symptoms or new symptoms including chest pain, shortness of breath, wheezing, nausea, voming, abdominal pain, please seek medical attention.      SECONDARY DISCHARGE DIAGNOSES  Diagnosis: Visual hallucination  Assessment and Plan of Treatment: You presented to the hospital with visual hallucination. You underwent an MRI which showed multiple chronic microhemorrhages in both cerebral hemispheres with additional chronic microhemorrhages in rocky and cerebellum. There's no acute changes found on the MRI. Neurology and psychiatry also evaluated you in the hospital. 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 You were started on seroquel 12.5 mg once daily at bedtime per neurology and psychiatry recommendation. You have reported improvement in visual hallucination and can now be discharged with outpatient follow up with neurologist Dr Horner ([phone]) and PCP.   Please continue to take seroquel 12.5 mg once daily at bedtime and follow up with your PCP for QTC monitoring.   If you start to experience worsening of current symptoms or new symptoms including shortness of breath, chest pain, new hallucinations, worsening hallucinations, any auditory, tactile hallucinations, headache, dizziness, blurry vision, please seek medical attention.       Diagnosis: Fall  Assessment and Plan of Treatment: You fall in the hospital as a response to visual hallucination. CT was performed to rule out any abnormalities and found a left periorbital hematoma with a comminuted   fracture of the left medial orbital wall and resultant hemorrhagic air-fluid levels in the left ethmoid sinus, extending into the left sphenoid sinus. Plastic surgery and ophthalmology evaluated you in the hospital and recommended no acute intervention. Please follow the following instructions (1) do not blow your nose; (2) do not place objects in your nose; (3) do not engage in strenuous activities or activities in which facial trauma may be possible; and (4) sneeze with an open mouth. You have remain stable and can now be discharged with outaptient follow up with ophthalmology and plastic surgery in a week.   Please use artificial tears to both eyes every 4 hours.   If you start to experience worsening of current symptoms or new symptoms including worsening hematoma, eye pain, shortness of breath, chest pain, new hallucinations, worsening hallucinations, any auditory, tactile hallucinations, headache, dizziness, blurry vision, please seek medical attention.       Diagnosis: Diabetes  Assessment and Plan of Treatment: You were noted to have hypoglycemic episode in the hospital with your home diabetes regime. Endocrinology evaluated you in the hospital to further optimize your diabetes regime. Your sugar has remain stable after adjustment and can now be discharged with outpatient endocrinology follow up.   Please start use lantus 12 units once daily at bedtime. Please take januvia 100 mg once daily. Please monitor your sugar at home using the freestyle chin 3 reader. Please follow up with Dr. Panchal at the appointment scheduled for 6/10 1:15 pm. Please contact endocrinology office is sugar is persistently above 200 x2 days or less than 70 for medication adjustment.   If you start to experience worsening of current symptoms or new symptoms including nausea, vomiting, urinating excessively, pain with urination, low sugar, dizziness, please seek medical attention.    Diagnosis: T wave inversion in EKG  Assessment and Plan of Treatment: You were found to have some nonspecific T wave inversions during this hospitalization. Your cardiac enzymes have been negative and cardiologist evaluated you, ruled out any concern for heart attack. You have remain symptoms free and can now be discharged with outpatient follow up with cardiology in 2 weeks.   If you start to experience worsening of current symptoms or new symptoms including nausea, vomiting, chest pain, dizziness, numbness in extremities, abdominal pain, please seek medical attention.    Diagnosis: Hypertension  Assessment and Plan of Treatment: Your blood pressure was noted to be elevated during admission. Your nifedipine dosage is increased to 60 mg once daily. 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 If you heart rate drops below 60s, you can skip a dose of labetalol, continue to take nifedipine. Please follow up with your PCP regularly for monitoring.   If you start to experience worsening of current symptoms or new symptoms including nausea, vomiting, chest pain, dizziness, numbness in extremities, abdominal pain, please seek medical attention.

## 2025-04-23 NOTE — PROGRESS NOTE ADULT - PROBLEM SELECTOR PLAN 9
- Pt takes lantus 22 in AM, 28 at night    Plan:   - check A1C - 8   - place pt back at home dose given likely will have hyperglycemia from steroid - lantus 22 in AM and 28 at night -> switched to lantus 14 AM/14 PM and lantus 10 AM/10PM   - FS premeal and qhs   - nutrition c/s  - pt had multiple episodes of hypoglycemic down titrating her insulin   - Endo c/s, appreciate recs - stop AM lantus, continue with lantus 14 units QHS with low ISS - Pt takes lantus 22 in AM, 28 at night    Plan:   - check A1C - 8   - place pt back at home dose given likely will have hyperglycemia from steroid - lantus 22 in AM and 28 at night -> switched to lantus 14 AM/14 PM and lantus 10 AM/10PM   - FS premeal and qhs   - nutrition c/s  - pt had multiple episodes of hypoglycemic down titrating her insulin   - Endo c/s, appreciate recs - stop AM lantus, start lantus 12 qhs, admelog 2 units TID, low ISS

## 2025-04-23 NOTE — DISCHARGE NOTE PROVIDER - NSDCCPTREATMENT_GEN_ALL_CORE_FT
PRINCIPAL PROCEDURE  Procedure: Brain MRI  Findings and Treatment: IMPRESSION:  No MRI evidence of acute intracranial pathology.  There are multiple chronic microhemorrhages in both cerebral hemispheres   with additional chronic microhemorrhages seen in the rocky and cerebellum.    No evidence of an old lobe or hemorrhage.  Mild microvascular-type changes in the periventricular and deep white   matter.  Status post prior endoscopic nasal surgery.  Near complete opacification   the right maxillary sinus.  Dilated superior ophthalmic veins are slightly increased in size from   10/27/2023.     PRINCIPAL PROCEDURE  Procedure: Brain MRI  Findings and Treatment: IMPRESSION:  No MRI evidence of acute intracranial pathology.  There are multiple chronic microhemorrhages in both cerebral hemispheres   with additional chronic microhemorrhages seen in the rocky and cerebellum.    No evidence of an old lobe or hemorrhage.  Mild microvascular-type changes in the periventricular and deep white   matter.  Status post prior endoscopic nasal surgery.  Near complete opacification   the right maxillary sinus.  Dilated superior ophthalmic veins are slightly increased in size from   10/27/2023.      SECONDARY PROCEDURE  Procedure: CT maxillofacial area  Findings and Treatment:        PRINCIPAL PROCEDURE  Procedure: Brain MRI  Findings and Treatment: IMPRESSION:  No MRI evidence of acute intracranial pathology.  There are multiple chronic microhemorrhages in both cerebral hemispheres   with additional chronic microhemorrhages seen in the rocky and cerebellum.    No evidence of an old lobe or hemorrhage.  Mild microvascular-type changes in the periventricular and deep white   matter.  Status post prior endoscopic nasal surgery.  Near complete opacification   the right maxillary sinus.  Dilated superior ophthalmic veins are slightly increased in size from   10/27/2023.      SECONDARY PROCEDURE  Procedure: CT maxillofacial area  Findings and Treatment: CT MAXILLOFACIAL BONES: Left periorbital hematoma with a comminuted   fracture of the left medial orbital wall and resultant hemorrhagic   air-fluid levels in the left ethmoid sinus, extending into the left   sphenoid sinus. There is herniation of the orbital fat and small parts of   the left medial rectus and inferior rectus muscles that extends into the   defect. Correlate clinically for muscular entrapment.

## 2025-04-23 NOTE — PROGRESS NOTE ADULT - SUBJECTIVE AND OBJECTIVE BOX
DIABETES FOLLOW UP NOTE: Saw pt earlier today    Chief Complaint: Endocrine consult requested for management of DM    INTERVAL HX: Pt stable, reports tolerating POs with BG levels mostly at goal in the last 24 hours while on present insulin doses. Noted pt received meal time insulin this am but hardly ate breakfast. pt reports she was tired and wanted to sleep. Noted pt is on Megestrol.   Pt denies any SOB/CP/N/V/D. Pt is now in ACE unit. No hypoglycemia. On methylprednisolone 8mg daily. Per primary team, pt will be discharged home today.       Review of Systems:  General: As above  Cardiovascular: No chest pain, palpitations  Respiratory: No SOB, no cough  GI: No nausea, vomiting, abdominal pain  Endocrine: No S&Sx of hypoglycemia    Allergies    aspirin (Unknown)  codeine (Unknown)  codeine (Short breath)  Valium (Unknown)  cefepime (Short breath (Severe))  shellfish (Anaphylaxis)  penicillin (Anaphylaxis)  cefepime (Anaphylaxis)  penicillin (Unknown)  Percocet (Unknown)  Avelox (Short breath; Pruritus)  Dilaudid (Short breath)  ampicillin (Unknown)  Valium (Short breath)  OxyContin (Unknown)  aspirin (Short breath)  iodine (Short breath; Swelling)  tetanus toxoid (Short breath)  Avelox (Unknown)  meropenem (Unknown)    Intolerances      MEDICATIONS  insulin glargine Injectable (LANTUS) 12 Unit(s) SubCutaneous at bedtime  insulin lispro (ADMELOG) corrective regimen sliding scale   SubCutaneous three times a day before meals  insulin lispro (ADMELOG) corrective regimen sliding scale   SubCutaneous at bedtime  insulin lispro Injectable (ADMELOG) 2 Unit(s) SubCutaneous three times a day with meals  methylPREDNISolone 8 milliGRAM(s) Oral daily  rosuvastatin 5 milliGRAM(s) Oral at bedtime      PHYSICAL EXAM:  VITALS: T(C): 36.9 (04-23-25 @ 13:50)  T(F): 98.4 (04-23-25 @ 13:50), Max: 99.3 (04-23-25 @ 05:40)  HR: 54 (04-23-25 @ 14:53) (52 - 69)  BP: 105/62 (04-23-25 @ 13:50) (97/56 - 145/54)  RR:  (18 - 19)  SpO2:  (95% - 98%)  Wt(kg): --  GENERAL: Female laying in bed in NAD  Abdomen: Soft, nontender, non distended  Extremities: Warm, no edema in all 4 exts  NEURO: Alert and able to answer simple questions. Forgetful    LABS:  POCT Blood Glucose.: 154 mg/dL (04-23-25 @ 11:47)  POCT Blood Glucose.: 185 mg/dL (04-23-25 @ 07:59)  POCT Blood Glucose.: 208 mg/dL (04-22-25 @ 21:27)  POCT Blood Glucose.: 193 mg/dL (04-22-25 @ 17:39)  POCT Blood Glucose.: 135 mg/dL (04-22-25 @ 12:13)  POCT Blood Glucose.: 119 mg/dL (04-22-25 @ 08:45)  POCT Blood Glucose.: 148 mg/dL (04-21-25 @ 21:51)  POCT Blood Glucose.: 251 mg/dL (04-21-25 @ 17:10)  POCT Blood Glucose.: 160 mg/dL (04-21-25 @ 12:43)  POCT Blood Glucose.: 192 mg/dL (04-21-25 @ 08:51)  POCT Blood Glucose.: 65 mg/dL (04-21-25 @ 08:18)  POCT Blood Glucose.: 53 mg/dL (04-21-25 @ 08:09)  POCT Blood Glucose.: 70 mg/dL (04-21-25 @ 06:17)  POCT Blood Glucose.: 74 mg/dL (04-20-25 @ 22:05)  POCT Blood Glucose.: 92 mg/dL (04-20-25 @ 18:48)                            11.4   10.60 )-----------( 249      ( 23 Apr 2025 07:01 )             37.2       04-23    142  |  105  |  14  ----------------------------<  139[H]  4.1   |  23  |  0.73    eGFR: 85    Ca    8.9      04-23  Mg     2.2     04-23  Phos  2.3     04-23    TPro  6.1  /  Alb  3.5  /  TBili  0.3  /  DBili  x   /  AST  16  /  ALT  25  /  AlkPhos  74  04-23    Thyroid Function Tests:  04-18 @ 10:27 TSH 0.62 FreeT4 1.4 T3 -- Anti TPO -- Anti Thyroglobulin Ab -- TSI --      A1C with Estimated Average Glucose Result: 8.0 % (04-18-25 @ 10:27)  A1C with Estimated Average Glucose Result: 7.7 % (02-21-25 @ 08:41)      Estimated Average Glucose: 183 mg/dL (04-18-25 @ 10:27)  Estimated Average Glucose: 174 mg/dL (02-21-25 @ 08:41)        04-18 Chol 124 Direct LDL -- LDL calculated 56 HDL 54 Trig 67

## 2025-04-23 NOTE — DISCHARGE NOTE PROVIDER - CARE PROVIDERS DIRECT ADDRESSES
[email or web address]

## 2025-04-23 NOTE — DISCHARGE NOTE PROVIDER - CARE PROVIDER_API CALL
Bon Samuels  Internal Medicine  [address] [address]  Phone: [phone]  Fax: [phone]  Follow Up Time: 2 weeks   Bon Samuels  Internal Medicine  [address] [facility] [address]  [place] [address]  Phone: [phone]  Fax: [phone]  Follow Up Time: 2 weeks    Moreno Panchal  Endocrinology/Metab/Diabetes  [address] [facility], [address]  [place] [address]  Phone: [phone]  Fax: [phone]  Established Patient  Scheduled Appointment: 06/10/2025 01:15 PM   Bon Samuels  Internal Medicine  [address] [facility] [address]  [place] [address]  Phone: [phone]  Fax: [phone]  Follow Up Time: 2 weeks    Moreno Panchal  Endocrinology/Metab/Diabetes  [address] [facility], [address]  [place] [address]  Phone: [phone]  Fax: [phone]  Established Patient  Scheduled Appointment: 06/10/2025 01:15 PM    Griffin Callaway  Plastic Surgery  [address] [place] [address] [address]  [place] [address]  Phone: [phone]  Fax: [phone]  Established Patient  Follow Up Time: 1 week

## 2025-04-23 NOTE — DISCHARGE NOTE PROVIDER - NSFOLLOWUPCLINICSTOKEN_GEN_ALL_ED_FT
654011:1 week|| ||00\01||True;000731:1 week|| ||00\01||True; 465487:1 week|| ||00\01||True;172277:1 week|| ||00\01||True;161773:1-3 days|| ||00\01||True;

## 2025-04-23 NOTE — DISCHARGE NOTE PROVIDER - NSFOLLOWUPCLINICS_GEN_ALL_ED_FT
[facility]hthal[facility]  Ophthalmology  [address] [facility], [address]  [place] [address]  Phone: [phone]  Fax:   Established Patient  Follow Up Time: 1 week    [facility] [place]  Plastic Surgery  [address] [facility] [address]  [place] [address]  Phone: [phone]  Fax:   Established Patient  Follow Up Time: 1 week     [facility] Ophthal[facility]  Ophthalmology  [address] [facility], [address]  [place] [address]  Phone: [phone]  Fax:   Established Patient  Follow Up Time: 1 week    [facility] [employer]  Plastic Surgery  [address] [facility] [address]  [place] [address]  Phone: [phone]  Fax:   Established Patient  Follow Up Time: 1 week    [facility]  Pulmonary  [place]   Phone:   Fax:   Established Patient  Follow Up Time: 1-3 days

## 2025-04-23 NOTE — DISCHARGE NOTE PROVIDER - NSDCMRMEDTOKEN_GEN_ALL_CORE_FT
ascorbic acid 500 mg oral tablet: 1 tab(s) orally once a day (in the morning)  Breo Ellipta 200 mcg-25 mcg/inh inhalation powder: 1 puff(s) inhaled once a day  budesonide 1 mg/2 mL inhalation suspension: 2 milliliter(s) by nebulizer 2 times a day  clopidogrel 75 mg oral tablet: 1 tab(s) orally once a day  Cranberry oral tablet: 1 tab(s) orally once a day (in the morning)  DuoNeb 0.5 mg-2.5 mg/3 mL inhalation solution: 3 milliliter(s) by nebulizer 4 times a day  Eliquis 5 mg oral tablet: 1 tab(s) orally 2 times a day  famotidine 20 mg oral tablet: 1 tab(s) orally once a day (at bedtime)  ferrous sulfate: 325 milligram(s) orally once a day  Flonase 50 mcg/inh nasal spray: 1 spray(s) in each nostril once a day  glycopyrrolate 1 mg oral tablet: 1 tab(s) orally 3 times a day  HumaLOG 100 units/mL injectable solution: injectable Sliding Scale  Incruse Ellipta 62.5 mcg/inh inhalation powder: 1 inhaler(s) inhaled once a day  ipratropium 21 mcg/inh (0.03%) nasal spray: 2 spray(s) intranasally once a day  Keppra 500 mg oral tablet: 2 tab(s) orally 2 times a day  labetalol 100 mg oral tablet: 1 tab(s) orally every 8 hours  lacosamide 100 mg oral tablet: 1 tab(s) orally 2 times a day  Lantus Solostar Pen 100 units/mL subcutaneous solution: 22 unit(s) subcutaneous once a day (in the morning)  Lantus Solostar Pen 100 units/mL subcutaneous solution: 28 unit(s) subcutaneous once a day (at bedtime)  magnesium citrate: 1 dose(s) once a day  megestrol 20 mg oral tablet: 1 tab(s) orally once a day as needed for appetite  methylPREDNISolone 8 mg oral tablet: 1 tab(s) orally once a day  mexiletine 200 mg oral capsule: 1 cap(s) orally 3 times a day  mineral oil oral liquid: 30 milliliter(s) orally once a day  montelukast 10 mg oral tablet: 1 tab(s) orally once a day (at bedtime)  multivitamin: 1 tab(s) orally once a day  NIFEdipine 30 mg oral tablet, extended release: 1 tab(s) orally once a day  Ohtuvayre 3 mg/ 2.5mL inhalation suspension: 3 milligram(s) by nebulizer 2 times a day  pantoprazole 40 mg oral delayed release tablet: 1 tab(s) orally once a day  Perforomist 20 mcg/2 mL inhalation solution: 2 milliliter(s) inhaled 2 times a day  polyethylene glycol 3350 oral powder for reconstitution: 17 gram(s) orally once a day  rosuvastatin 5 mg oral tablet: 1 tab(s) orally once a day (at bedtime)  Senna 8.6 mg oral tablet: 2 tab(s) orally once a day  sertraline 50 mg oral tablet: 1 tab(s) orally once a day (in the morning) as needed for  anxiety  Sodium Chloride 7% Inhalation Solution: twice daily  [place] Pre-filled Pen 210 mg/1.91 mL subcutaneous solution: 210 milligram(s) subcutaneously once a month   ascorbic acid 500 mg oral tablet: 1 tab(s) orally once a day (in the morning)  Breo Ellipta 200 mcg-25 mcg/inh inhalation powder: 1 puff(s) inhaled once a day  budesonide 1 mg/2 mL inhalation suspension: 2 milliliter(s) by nebulizer 2 times a day  clopidogrel 75 mg oral tablet: 1 tab(s) orally once a day  Cranberry oral tablet: 1 tab(s) orally once a day (in the morning)  DuoNeb 0.5 mg-2.5 mg/3 mL inhalation solution: 3 milliliter(s) by nebulizer 4 times a day  Eliquis 5 mg oral tablet: 1 tab(s) orally 2 times a day  famotidine 20 mg oral tablet: 1 tab(s) orally once a day (at bedtime)  ferrous sulfate: 325 milligram(s) orally once a day  Flonase 50 mcg/inh nasal spray: 1 spray(s) in each nostril once a day  glycopyrrolate 1 mg oral tablet: 1 tab(s) orally 3 times a day  HumaLOG 100 units/mL injectable solution: injectable Sliding Scale  Incruse Ellipta 62.5 mcg/inh inhalation powder: 1 inhaler(s) inhaled once a day  ipratropium 21 mcg/inh (0.03%) nasal spray: 2 spray(s) intranasally once a day  Januvia 100 mg oral tablet: 1 tab(s) orally once a day  Keppra 500 mg oral tablet: 2 tab(s) orally 2 times a day  labetalol 100 mg oral tablet: 1 tab(s) orally every 8 hours  lacosamide 100 mg oral tablet: 1 tab(s) orally 2 times a day  Lantus Solostar Pen 100 units/mL subcutaneous solution: 22 unit(s) subcutaneous once a day (in the morning)  Lantus Solostar Pen 100 units/mL subcutaneous solution: 28 unit(s) subcutaneous once a day (at bedtime)  Lantus Solostar Pen 100 units/mL subcutaneous solution: 12 unit(s) subcutaneous once a day (at bedtime)  magnesium citrate: 1 dose(s) once a day  megestrol 20 mg oral tablet: 1 tab(s) orally once a day as needed for appetite  methylPREDNISolone 8 mg oral tablet: 1 tab(s) orally once a day  mexiletine 200 mg oral capsule: 1 cap(s) orally 3 times a day  mineral oil oral liquid: 30 milliliter(s) orally once a day  montelukast 10 mg oral tablet: 1 tab(s) orally once a day (at bedtime)  multivitamin: 1 tab(s) orally once a day  NIFEdipine 30 mg oral tablet, extended release: 1 tab(s) orally once a day  Ohtuvayre 3 mg/ 2.5mL inhalation suspension: 3 milligram(s) by nebulizer 2 times a day  pantoprazole 40 mg oral delayed release tablet: 1 tab(s) orally once a day  Perforomist 20 mcg/2 mL inhalation solution: 2 milliliter(s) inhaled 2 times a day  polyethylene glycol 3350 oral powder for reconstitution: 17 gram(s) orally once a day  rosuvastatin 5 mg oral tablet: 1 tab(s) orally once a day (at bedtime)  Senna 8.6 mg oral tablet: 2 tab(s) orally once a day  sertraline 50 mg oral tablet: 1 tab(s) orally once a day (in the morning) as needed for  anxiety  Sodium Chloride 7% Inhalation Solution: twice daily  Te[place]sp[place] Pre-filled Pen 210 mg/1.91 mL subcutaneous solution: 210 milligram(s) subcutaneously once a month   ascorbic acid 500 mg oral tablet: 1 tab(s) orally once a day (in the morning)  Breo Ellipta 200 mcg-25 mcg/inh inhalation powder: 1 puff(s) inhaled once a day  budesonide 1 mg/2 mL inhalation suspension: 2 milliliter(s) by nebulizer 2 times a day  Cranberry oral tablet: 1 tab(s) orally once a day (in the morning)  DuoNeb 0.5 mg-2.5 mg/3 mL inhalation solution: 3 milliliter(s) by nebulizer 4 times a day  famotidine 20 mg oral tablet: 1 tab(s) orally once a day (at bedtime)  ferrous sulfate: 325 milligram(s) orally once a day  Flonase 50 mcg/inh nasal spray: 1 spray(s) in each nostril once a day  glycopyrrolate 1 mg oral tablet: 1 tab(s) orally 3 times a day  Incruse Ellipta 62.5 mcg/inh inhalation powder: 1 inhaler(s) inhaled once a day  ipratropium 21 mcg/inh (0.03%) nasal spray: 2 spray(s) intranasally once a day  Januvia 100 mg oral tablet: 1 tab(s) orally once a day  Keppra 500 mg oral tablet: 2 tab(s) orally 2 times a day  labetalol 100 mg oral tablet: 1 tab(s) orally every 8 hours  lacosamide 100 mg oral tablet: 1 tab(s) orally 2 times a day  Lantus Solostar Pen 100 units/mL subcutaneous solution: 12 unit(s) subcutaneous once a day (at bedtime)  magnesium citrate: 1 dose(s) once a day  megestrol 20 mg oral tablet: 1 tab(s) orally once a day as needed for appetite  methylPREDNISolone 8 mg oral tablet: 1 tab(s) orally once a day  mexiletine 200 mg oral capsule: 1 cap(s) orally 3 times a day  mineral oil oral liquid: 30 milliliter(s) orally once a day  montelukast 10 mg oral tablet: 1 tab(s) orally once a day (at bedtime)  multivitamin: 1 tab(s) orally once a day  NIFEdipine 60 mg oral tablet, extended release: 1 tab(s) orally once a day  ocular lubricant preservative-free ophthalmic solution: 1 drop(s) to each affected eye every 4 hours  Ohtuvayre 3 mg/ 2.5mL inhalation suspension: 3 milligram(s) by nebulizer 2 times a day  pantoprazole 40 mg oral delayed release tablet: 1 tab(s) orally once a day  Perforomist 20 mcg/2 mL inhalation solution: 2 milliliter(s) inhaled 2 times a day  polyethylene glycol 3350 oral powder for reconstitution: 17 gram(s) orally once a day  rosuvastatin 5 mg oral tablet: 1 tab(s) orally once a day (at bedtime)  Senna 8.6 mg oral tablet: 2 tab(s) orally once a day  Seroquel 25 mg oral tablet: 0.5 tab(s) orally once a day (at bedtime)  sertraline 50 mg oral tablet: 1 tab(s) orally once a day (in the morning)  Sodium Chloride 7% Inhalation Solution: twice daily  Te[place]sp[place] Pre-filled Pen 210 mg/1.91 mL subcutaneous solution: 210 milligram(s) subcutaneously once a month   ascorbic acid 500 mg oral tablet: 1 tab(s) orally once a day (in the morning)  Breo Ellipta 200 mcg-25 mcg/inh inhalation powder: 1 puff(s) inhaled once a day  budesonide 1 mg/2 mL inhalation suspension: 2 milliliter(s) by nebulizer 2 times a day  Cranberry oral tablet: 1 tab(s) orally once a day (in the morning)  DuoNeb 0.5 mg-2.5 mg/3 mL inhalation solution: 3 milliliter(s) by nebulizer 4 times a day  famotidine 20 mg oral tablet: 1 tab(s) orally once a day (at bedtime)  ferrous sulfate: 325 milligram(s) orally once a day  Flonase 50 mcg/inh nasal spray: 1 spray(s) in each nostril once a day  glycopyrrolate 1 mg oral tablet: 1 tab(s) orally 3 times a day  Incruse Ellipta 62.5 mcg/inh inhalation powder: 1 inhaler(s) inhaled once a day  ipratropium 21 mcg/inh (0.03%) nasal spray: 2 spray(s) intranasally once a day  Januvia 100 mg oral tablet: 1 tab(s) orally once a day  Keppra 500 mg oral tablet: 2 tab(s) orally 2 times a day  labetalol 100 mg oral tablet: 1 tab(s) orally every 8 hours  lacosamide 100 mg oral tablet: 1 tab(s) orally 2 times a day  Lantus Solostar Pen 100 units/mL subcutaneous solution: 12 unit(s) subcutaneous once a day (at bedtime)  magnesium citrate: 1 dose(s) once a day  megestrol 20 mg oral tablet: 1 tab(s) orally once a day as needed for appetite  methylPREDNISolone 8 mg oral tablet: 1 tab(s) orally once a day  mexiletine 200 mg oral capsule: 1 cap(s) orally 3 times a day  mineral oil oral liquid: 30 milliliter(s) orally once a day  montelukast 10 mg oral tablet: 1 tab(s) orally once a day (at bedtime)  multivitamin: 1 tab(s) orally once a day  NIFEdipine 60 mg oral tablet, extended release: 1 tab(s) orally once a day  ocular lubricant preservative-free ophthalmic solution: 1 drop(s) to each affected eye every 4 hours  Ohtuvayre 3 mg/ 2.5mL inhalation suspension: 3 milligram(s) by nebulizer 2 times a day  pantoprazole 40 mg oral delayed release tablet: 1 tab(s) orally once a day  Perforomist 20 mcg/2 mL inhalation solution: 2 milliliter(s) inhaled 2 times a day  polyethylene glycol 3350 oral powder for reconstitution: 17 gram(s) orally once a day  rosuvastatin 5 mg oral tablet: 1 tab(s) orally once a day (at bedtime)  Senna 8.6 mg oral tablet: 2 tab(s) orally once a day  Seroquel 25 mg oral tablet: 0.5 tab(s) orally once a day (at bedtime)  sertraline 50 mg oral tablet: 1 tab(s) orally once a day (in the morning)  Sodium Chloride 7% Inhalation Solution: twice daily  Tezspire Pre-filled Pen 210 mg/1.91 mL subcutaneous solution: 210 milligram(s) subcutaneously once a month  traMADol 50 mg oral tablet: 1 tab(s) orally every 6 hours as needed for  severe pain

## 2025-04-23 NOTE — DISCHARGE NOTE PROVIDER - NSDCFUADDAPPT_GEN_ALL_CORE_FT
APPTS ARE READY TO BE MADE: [X] YES    Best Family or Patient Contact (if needed):    Additional Information about above appointments (if needed):    1: follow up with PCP in 2 weeks   2: follow up with endocrinology in 1 month   3: follow up with plastic surgery in 1 week   4: follow up with opthalmology in 1 week   5: follow up with cardiology in 2 weeks    Other comments or requests:    APPTS ARE READY TO BE MADE: [X] YES    Best Family or Patient Contact (if needed):    Additional Information about above appointments (if needed):    1: follow up with PCP in 2 weeks   2: follow up with endocrinology in 1 month   3: follow up with plastic surgery in 1 week   4: follow up with opthalmology in 1 week   5: follow up with cardiology in 2 weeks  6: follow up with pulmonary in 1-3 days     Other comments or requests:    APPTS ARE READY TO BE MADE: [X] YES    Best Family or Patient Contact (if needed):    Additional Information about above appointments (if needed):    1: follow up with PCP in 2 weeks   2: follow up with endocrinology in 1 month   3: follow up with plastic surgery in 1 week   4: follow up with opthalmology in 1 week   5: follow up with cardiology in 2 weeks  6: follow up with pulmonary in 1-3 days     Other comments or requests:     OPHT, PLASTICS, HOME PULM - Patient was outreached but did not answer. A voicemail was left for the patient to return our call.    INTMED - Prior to outreaching the patient, it was visible that the patient has secured a follow up appointment which was not scheduled by our team. Patient is scheduled with Dr. Samuels 4/28 1:40pm [address] [facility]    CARDIO - Prior to outreaching the patient, it was visible that the patient has secured a follow up appointment which was not scheduled by our team. Patient is scheduled with Dr. Glover 4/30 11:45am [address] [place] [address]    ENDO - Prior to outreaching the patient, it was visible that the patient has secured a follow up appointment which was not scheduled by our team. Patient is scheduled with Dr. Panchal 6/10 1:15pm [address] [facility]   APPTS ARE READY TO BE MADE: [X] YES    Best Family or Patient Contact (if needed):    Additional Information about above appointments (if needed):    1: follow up with PCP in 2 weeks   2: follow up with endocrinology in 1 month   3: follow up with plastic surgery in 1 week   4: follow up with opthalmology in 1 week   5: follow up with cardiology in 2 weeks  6: follow up with pulmonary in 1-3 days     Other comments or requests:     OPHT - Appointment was scheduled by our team on the patient's behalf through the provider's office. Patient is scheduled with Dr. Michaels 5/5 1:00pm [address]    PLASTICS - Appointment was scheduled by our team on the patient's behalf through the provider's office. Patient is scheduled with Dr. Callaway 5/13 12:30pm [address]    HOME PULM - Patient advised they did not want to proceed with scheduling appointments with the providers on their referrals. They will coordinate care on their own.     INTMED - Prior to outreaching the patient, it was visible that the patient has secured a follow up appointment which was not scheduled by our team. Patient is scheduled with Dr. Samuels 4/28 1:40pm [address] [facility]    CARDIO - Prior to outreaching the patient, it was visible that the patient has secured a follow up appointment which was not scheduled by our team. Patient is scheduled with Dr. Glover 4/30 11:45am [address] [place] [address]    ENDO - Prior to outreaching the patient, it was visible that the patient has secured a follow up appointment which was not scheduled by our team. Patient is scheduled with Dr. Panchal 6/10 1:15pm [address] [facility]

## 2025-04-23 NOTE — PROGRESS NOTE ADULT - PROBLEM SELECTOR PLAN 3
- 4/19 noted new T wave inversion most prominently in V2 (not seen on 4/18 EKG)   - repeat EKG still showed T2 inversion in V2,    - pt currently asymtomatic and VSS     Plan:  - check troponin - 33 -> 23   - check CKMB - 3 -> 2.6   - cards c/s, appreciate recs - 4/19 noted new T wave inversion most prominently in V2 (not seen on 4/18 EKG)   - repeat EKG still showed T2 inversion in V2,    - pt currently asymtomatic and VSS     Plan:  - check troponin - 33 -> 23   - check CKMB - 3 -> 2.6   - cards c/s, appreciate recs- No evidence of ACS, Troponin negative, No further inpatient cardiac workup anticipated at this time

## 2025-04-23 NOTE — DISCHARGE NOTE PROVIDER - PROVIDER TOKENS
PROVIDER:[TOKEN:[[id number]],FOLLOWUP:[2 weeks]] PROVIDER:[TOKEN:[[id number]],FOLLOWUP:[2 weeks]],PROVIDER:[TOKEN:[[id number]],SCHEDULEDAPPT:[06/10/2025],SCHEDULEDAPPTTIME:[01:15 PM],ESTABLISHEDPATIENT:[T]] PROVIDER:[TOKEN:[[id number]],FOLLOWUP:[2 weeks]],PROVIDER:[TOKEN:[[id number]],SCHEDULEDAPPT:[06/10/2025],SCHEDULEDAPPTTIME:[01:15 PM],ESTABLISHEDPATIENT:[T]],PROVIDER:[TOKEN:[[id number]],FOLLOWUP:[1 week],ESTABLISHEDPATIENT:[T]]

## 2025-04-23 NOTE — PROGRESS NOTE ADULT - PROBLEM SELECTOR PLAN 5
- pt presented with multiple falls in the past month   - Had an inpatient fall 4/20 with face strike and bloody nose and bruised zygomatic process  - CT pelvis with background of bilateral avascular necrosis of the hips    Plan:   - f/u infectious work up as mentioned above - neg currently   - PT/OT/OOB to chair   - nutrition consult  - check orthostatic VS - negative  - CT Head and maxillofacial - Left periorbital hematoma with a comminuted fracture of the left medial orbital wall and resultant hemorrhagic air-fluid levels in the left ethmoid sinus, extending into the left sphenoid sinus.   - x-ray of wrists b/l showed no acute fracture. No dislocation. Joint spaces are maintained. Vascular calcification is present.  - CT Pelvis showed Degenerative changes of both hips. There is avascular necrosis of both femoral heads without articular surface collapse at this time.  *** Will need follow up with orthopedics as an outpatient, can try with Dr Benjamin  - 1:1   - plastic surgery and opthalmology c/s, appreciate recs  - 4/22 noted to have blood tinged sputum, discussed with plastics and opthalmology, per plastic, this can be related to the medial orbital wall fracture, however no acute intervention is needed, CTM and ok to resume AC - pt presented with multiple falls in the past month   - Had an inpatient fall 4/20 with face strike and bloody nose and bruised zygomatic process  - CT pelvis with background of bilateral avascular necrosis of the hips    Plan:   - f/u infectious work up as mentioned above - neg currently   - PT/OT/OOB to chair   - nutrition consult  - check orthostatic VS - negative  - CT Head and maxillofacial - Left periorbital hematoma with a comminuted fracture of the left medial orbital wall and resultant hemorrhagic air-fluid levels in the left ethmoid sinus, extending into the left sphenoid sinus.   - x-ray of wrists b/l showed no acute fracture. No dislocation. Joint spaces are maintained. Vascular calcification is present.  - CT Pelvis showed Degenerative changes of both hips. There is avascular necrosis of both femoral heads without articular surface collapse at this time.  *** Will need follow up with orthopedics as an outpatient, can try with Dr Benjamin  - 1:1   - plastic surgery and opthalmology c/s, appreciate recs  - 4/22 noted to have blood tinged sputum, discussed with plastics and opthalmology, per plastic, this can be related to the medial orbital wall fracture, however no acute intervention is needed, CTM and ok to resume AC  - 4/23 given pt's multiple falls, ongoing discussion with daughter regarding whether to resume AC

## 2025-04-23 NOTE — PROGRESS NOTE ADULT - ASSESSMENT
74y F w/h/o uncontrolled T2DM (A1C 8%) on bid basal insulin plus Humalog scale PTA. DM c/b TIA/neuropathy. Also h/o epilepsy on Keppra, COPD, asthma  (on CPAP and VATS at home, on chronic steroids), bronchiectasis, tracheomalacia s/p tracheloplasty, HTN, Hx of dissection of descending thoracic aorta, hx of aortic aneurysm, Type B dissection (7/2024), medically managed initially as she did not meet criteria for surgery at that time), Type A dissection s/p bioAVR with Bental procedure (9/2022), severe AS s/p TAVR (9/10/2023), TIA's, DVT, Afib on Eliquis,Colon cancer S/P resection, colostomy bag, Midline for IV ABX placed 2 weeks ago for chronic "TB like" pneumonia on ertapenmen. Here with worsening shortness of breath with multiple falls, urinary burning, visual hallucination, admitted for further management of asthma/COPD exacerbation. Endocrine consulted for T2DM on steroid pulse and now tapered to back to home steroid dose. BG  controlled based on age and comorbidities. Presently requiring lower insulin doses than PTA and also very low meal time insulin. Per primary team, pt going home today. Spoke to team about the need for basal insulin but also the need for family to administer insulin since pt is not presently able to do it safely. Per team, pt's daughter was administering insulin at home. Will simplify regime to keep BG goal 100 to low 200s due to age and comorbidities.       Home regimen per EMR:  lantus 22 units qAM and 28 units bedtime plus humalog scale. Pt can't recall what she is doing but states sometimes she wakes up at night with BG at 50s.

## 2025-04-23 NOTE — PROGRESS NOTE ADULT - NSPROGADDITIONALINFOA_GEN_ALL_CORE
-Plan discussed with pt/team.  Contact info: [phone] (24/7). pager [phone]  Amion on [place]  Teams on M-T-W-F. Unavailable Thu/Weekends/Holidays  Assessed pt/labs/meds and discussed plan of care with primary team  Adjusting insulin  Discharge plan  Follow up care
-Plan discussed with pt/team.  Contact info: [phone] (24/7). pager [phone]  Amion on [place]  Teams on M-T-W-F. Unavailable Thu/Weekends/Holidays  Assessed pt/labs/meds and discussed plan of care with primary team  Adjusting insulin  Discharge plan  Follow up care

## 2025-04-23 NOTE — PROGRESS NOTE ADULT - ASSESSMENT
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 Antonio, Type A dissection s/p bioAVR with Bental procedure (9/2022), severe AS s/p TAVR (9/10/2023), TIA's, DVT, Afib on Eliquis,  Colon cancer S/P resection, colostomy bag, neuropathy, Midline for IV ABX placed 2 weeks ago for chronic "TB like" pneumonia on ertapenmen (today is supposed to be the last dose at 6pm) presenting today for worsening shortness of breath over the last 7 days. multiple falls, urinary burning, visual hallucination, admitted for further management of asthma/COPD exacerbation, and further eval of new onset visual hallucination. 4/17 Noted new T wave inversion most prominently in V2, troponin/CKMB neg, cards c/s. On 4/20, pt had a fall resulting in L periorbital hematoma with comminuted fracture of the left medial orbital wall.

## 2025-04-23 NOTE — PROGRESS NOTE ADULT - PROBLEM SELECTOR PLAN 1
- pt reports she had generalized body shaking when she was diagnosed with seizure   - visual hallucination "seeing sister and  when they are not there" is a new occurrence that started last Friday, denies auditory hallucination   - pt is AOX3 (at baseline mental status) with no other mood/psychotic symptoms and hallucination is different from prior seizure symptoms, lower concern for psychiatric or seizure etiology for this new onset visual hallucination   - Patient is now s/p fall (4/20) while trying to get away from hallucinations    Plan:   - send infectious workup to r/o infectious etiology of hallucination   - f/u BCx, UCx, procalcitonin - negative so far   - MRI brain w/wo - There are multiple chronic microhemorrhages in both cerebral hemispheres with additional chronic microhemorrhages seen in the rocky and cerebellum. Mild microvascular-type changes in the periventricular and deep white matter. S/p endoscopic nasal surgery. Near complete opacification the right maxillary sinus. Dilated superior ophthalmic veins are slightly increased in size from before.  - Neuro c/s, appreciate recs - seem c/w hypnagogic hallucination, can be treated with 12.5 mg quetiapine qhs only if pt is reacting to hallucination/paranoid   - psych c/s, appreciate recs - diff include complicated grief rxn, temporal lobe seizure, medication induced hallucination  - Begin seroquel 12.5mg qHS and c/w Zoloft 50 mg qhs   - Repeat EKG -  on 4/20   - 1:1

## 2025-04-23 NOTE — PROGRESS NOTE ADULT - TIME BILLING
chart review, examination, and discussion of plan with patient and team
Medical management as above, reviewing chart and coordinating care with primary team/staff, as well as reviewing vitals, radiology, medication list, recent labs, and prior records.    Does not include teaching time.
Medical management as above, reviewing chart and coordinating care with primary team/staff, as well as reviewing vitals, radiology, medication list, recent labs, and prior records.    Does not include teaching time.
chart reviewing, history taking, physical exam, assessment and documentation, including speaking to specialist/SW/CM regarding the management.

## 2025-04-23 NOTE — DISCHARGE NOTE PROVIDER - DETAILS OF MALNUTRITION DIAGNOSIS/DIAGNOSES
This patient has been assessed with a concern for Malnutrition and was treated during this hospitalization for the following Nutrition diagnosis/diagnoses:     -  04/18/2025: Severe protein-calorie malnutrition

## 2025-04-23 NOTE — DISCHARGE NOTE PROVIDER - HOSPITAL COURSE
HPI:  [id number] with pmhx of Epilepsy on Keppra, COPD, asthma  (on CPAP and VATS at home, on chronic steroids), DM2, bronchiectasis, tracheomalacia s/p tracheloplasty, HTN, Hx of dissection of descending thoracic aorta, hx of aortic aneurysm, Type B dissection (7/2024), medically managed initially as she did not meet criteria for surgery at that time), evaluated by Dr. Antonio, Type A dissection s/p bioAVR with Bental procedure (9/2022), severe AS s/p TAVR (9/10/2023), TIA's, DVT, Afib on Eliquis,  Colon cancer S/P resection, colostomy bag, neuropathy, Midline for IV ABX placed 2 weeks ago for chronic "TB like" pneumonia on ertapenmen (today is supposed to be the last dose at 6pm) presenting today for worsening shortness of breath over the last 7 days. Per daughter, pt does not use inhaler Q4 as instructed and when it happens, pt develops SOB even with just 5 meters of walking. Pt states that she has fall 3-4 times in the last month, with last episode this morning 5AM when she was walking to the bathroom. She felt like her legs were giving up, denies any dizziness, headstrike, injury to any body parts, LOC, CP, palpitation. She also endorse intermittent new onset visual hallucination (seeing  and sister when they are not there) starting Friday 4/11. She reports having chronic cough due to asthma but has also been having burning on urination. Denies fever, recent illness, abd pain, n/v/d.       ED Course: bradycardic in 40s, on 4L of NC, tachpechnic to RR of 30s. Labs showed mild hyperkalemia 5.5 (hemolyzed), glucose 338, proBNP 3648 (baseline 1896 in Feb 2025), CXR showed trace R pleural effusion.   Received solumedrol 40 mg IV, duoneb x 3 in ED.  (17 Apr 2025 17:26)    Hospital Course: The pt was admitted to medicine for further management of asthma/COPD exacerbation and new onset visual hallucination. Infectious disease was c/s and recommended stop Ertapenem and monitor off abx. Pulmonary was c/s and recommended tapering of steroids from IV methyprednisolone 60 to prednisone 40 and eventually home dose methyprednisolone. MRI of brain showed multiple chronic microhemorrhages in both cerebral hemispheres with additional chronic microhemorrhages seen in the rocky and cerebellum, mild microvascular-type changes in the periventricular and deep white matter, near complete opacification the right maxillary sinus, dilated superior ophthalmic veins are slightly increased in size from 10/27/2023, no acute intracranial pathology was seen. Neurology and psych evaluated the pt in the hospital. Per neuro, pt's episodes appear to be c/w hyponagogic hallucination. Pt was started on 12.5 mg quetiapine qhs.     Important Medication Changes and Reason:    Active or Pending Issues Requiring Follow-up:    Advanced Directives:   [ ] Full code  [ ] DNR  [ ] Hospice             HPI:  [id number] with pmhx of Epilepsy on Keppra, COPD, asthma  (on CPAP and VATS at home, on chronic steroids), DM2, bronchiectasis, tracheomalacia s/p tracheloplasty, HTN, Hx of dissection of descending thoracic aorta, hx of aortic aneurysm, Type B dissection (7/2024), medically managed initially as she did not meet criteria for surgery at that time), evaluated by Dr. Antonio, Type A dissection s/p bioAVR with Bental procedure (9/2022), severe AS s/p TAVR (9/10/2023), TIA's, DVT, Afib on Eliquis,  Colon cancer S/P resection, colostomy bag, neuropathy, Midline for IV ABX placed 2 weeks ago for chronic "TB like" pneumonia on ertapenmen (today is supposed to be the last dose at 6pm) presenting today for worsening shortness of breath over the last 7 days. Per daughter, pt does not use inhaler Q4 as instructed and when it happens, pt develops SOB even with just 5 meters of walking. Pt states that she has fall 3-4 times in the last month, with last episode this morning 5AM when she was walking to the bathroom. She felt like her legs were giving up, denies any dizziness, headstrike, injury to any body parts, LOC, CP, palpitation. She also endorse intermittent new onset visual hallucination (seeing  and sister when they are not there) starting Friday 4/11. She reports having chronic cough due to asthma but has also been having burning on urination. Denies fever, recent illness, abd pain, n/v/d.       ED Course: bradycardic in 40s, on 4L of NC, tachpechnic to RR of 30s. Labs showed mild hyperkalemia 5.5 (hemolyzed), glucose 338, proBNP 3648 (baseline 1896 in Feb 2025), CXR showed trace R pleural effusion.   Received solumedrol 40 mg IV, duoneb x 3 in ED.  (17 Apr 2025 17:26)    Hospital Course: The pt was admitted to medicine for further management of asthma/COPD exacerbation and new onset visual hallucination. Infectious disease was c/s and recommended stop Ertapenem and monitor off abx. Pulmonary was c/s and recommended tapering of steroids from IV methyprednisolone 60 to prednisone 40 and eventually home dose methyprednisolone. MRI of brain showed multiple chronic microhemorrhages in both cerebral hemispheres with additional chronic microhemorrhages seen in the rocky and cerebellum, mild microvascular-type changes in the periventricular and deep white matter, near complete opacification the right maxillary sinus, dilated superior ophthalmic veins are slightly increased in size from 10/27/2023, no acute intracranial pathology was seen. Neurology and psych evaluated the pt in the hospital. Per neuro, pt's episodes appear to be c/w hyponagogic hallucination. On 4/19, pt was noted to have new T wave inversion most prominently in V2, pt denied CP, trop/CKMB neg, cards c/s and reports low concern for ACS.  On 4/20, pt had a fall in the hospital in response to visual hallucination, CT showed Left periorbital hematoma with a comminuted fracture of the left medial orbital wall and resultant hemorrhagic air-fluid levels in the left ethmoid sinus, extending into the left sphenoid sinus. Pt was started on 12.5 mg quetiapine qhs per neuro and psych. Plastic surgery and ophthalmology were c/s, recommended no acute intervention, continue with nasal precaution. Per risks and discussion with the daughter, daughter would like pt to be off any AC/AP (eliquis and plavix) and follow up with PCP later to see if pt should still be on AC/AP. Pt had several episodes of hypoglycemia in the hospital. Endocrinology was c/s and down-titrated pt's insulinPt has remain hemodynamically stable and clinically improved. She can now be discharged with outpt PCP, plastic surgery, ophthalmology, cardiology, endocrinology follow up.     Important Medication Changes and Reason:  - Please CONTINUE taking quetiapine 12.5 mg qd qhs   - please STOP eliquis and plavix     Active or Pending Issues Requiring Follow-up:  - follow up with PCP  - follow up with plastic surgery  - follow up with opthalmology   - follow up with cardiology   - follow up with endocrinology     Advanced Directives:   [X] Full code  [ ] DNR  [ ] Hospice             HPI:  [id number] with pmhx of Epilepsy on Keppra, COPD, asthma  (on CPAP and VATS at home, on chronic steroids), DM2, bronchiectasis, tracheomalacia s/p tracheloplasty, HTN, Hx of dissection of descending thoracic aorta, hx of aortic aneurysm, Type B dissection (7/2024), medically managed initially as she did not meet criteria for surgery at that time), evaluated by Dr. Antonio, Type A dissection s/p bioAVR with Bental procedure (9/2022), severe AS s/p TAVR (9/10/2023), TIA's, DVT, Afib on Eliquis,  Colon cancer S/P resection, colostomy bag, neuropathy, Midline for IV ABX placed 2 weeks ago for chronic "TB like" pneumonia on ertapenmen (today is supposed to be the last dose at 6pm) presenting today for worsening shortness of breath over the last 7 days. Per daughter, pt does not use inhaler Q4 as instructed and when it happens, pt develops SOB even with just 5 meters of walking. Pt states that she has fall 3-4 times in the last month, with last episode this morning 5AM when she was walking to the bathroom. She felt like her legs were giving up, denies any dizziness, headstrike, injury to any body parts, LOC, CP, palpitation. She also endorse intermittent new onset visual hallucination (seeing  and sister when they are not there) starting Friday 4/11. She reports having chronic cough due to asthma but has also been having burning on urination. Denies fever, recent illness, abd pain, n/v/d.       ED Course: bradycardic in 40s, on 4L of NC, tachpechnic to RR of 30s. Labs showed mild hyperkalemia 5.5 (hemolyzed), glucose 338, proBNP 3648 (baseline 1896 in Feb 2025), CXR showed trace R pleural effusion.   Received solumedrol 40 mg IV, duoneb x 3 in ED.  (17 Apr 2025 17:26)    Hospital Course: The pt was admitted to medicine for further management of asthma/COPD exacerbation and new onset visual hallucination. Infectious disease was c/s and recommended stop Ertapenem and monitor off abx. Pulmonary was c/s and recommended tapering of steroids from IV methyprednisolone 60 to prednisone 40 and eventually home dose methyprednisolone. MRI of brain showed multiple chronic microhemorrhages in both cerebral hemispheres with additional chronic microhemorrhages seen in the rocky and cerebellum, mild microvascular-type changes in the periventricular and deep white matter, near complete opacification the right maxillary sinus, dilated superior ophthalmic veins are slightly increased in size from 10/27/2023, no acute intracranial pathology was seen. Neurology and psych evaluated the pt in the hospital. Per neuro, pt's episodes appear to be c/w hyponagogic hallucination. On 4/19, pt was noted to have new T wave inversion most prominently in V2, pt denied CP, trop/CKMB neg, cards c/s and reports low concern for ACS.  On 4/20, pt had a fall in the hospital in response to visual hallucination, CT showed Left periorbital hematoma with a comminuted fracture of the left medial orbital wall and resultant hemorrhagic air-fluid levels in the left ethmoid sinus, extending into the left sphenoid sinus. Pt was started on 12.5 mg quetiapine qhs per neuro and psych. Plastic surgery and ophthalmology were c/s, recommended no acute intervention, continue with nasal precaution. Per risks and discussion with the daughter, daughter would like pt to be off any AC/AP (eliquis and plavix) and follow up with PCP later to see if pt should still be on AC/AP. Pt had several episodes of hypoglycemia in the hospital. Endocrinology was c/s and down-titrated pt's insulin. Endo's discharge recs include: lantus 12 qhs and januvia 100 mg qd. Pt has remain hemodynamically stable and clinically improved. She can now be discharged with outpt PCP, plastic surgery, ophthalmology, cardiology, endocrinology follow up.     Important Medication Changes and Reason:  - Please CONTINUE taking quetiapine 12.5 mg qd qhs   - please STOP eliquis and plavix     Active or Pending Issues Requiring Follow-up:  - follow up with PCP  - follow up with plastic surgery  - follow up with opthalmology   - follow up with cardiology   - follow up with endocrinology     Advanced Directives:   [X] Full code  [ ] DNR  [ ] Hospice             HPI:  [id number] with pmhx of Epilepsy on Keppra, COPD, asthma  (on CPAP and VATS at home, on chronic steroids), DM2, bronchiectasis, tracheomalacia s/p tracheloplasty, HTN, Hx of dissection of descending thoracic aorta, hx of aortic aneurysm, Type B dissection (7/2024), medically managed initially as she did not meet criteria for surgery at that time), evaluated by Dr. Antonio, Type A dissection s/p bioAVR with Bental procedure (9/2022), severe AS s/p TAVR (9/10/2023), TIA's, DVT, Afib on Eliquis,  Colon cancer S/P resection, colostomy bag, neuropathy, Midline for IV ABX placed 2 weeks ago for chronic "TB like" pneumonia on ertapenmen (today is supposed to be the last dose at 6pm) presenting today for worsening shortness of breath over the last 7 days. Per daughter, pt does not use inhaler Q4 as instructed and when it happens, pt develops SOB even with just 5 meters of walking. Pt states that she has fall 3-4 times in the last month, with last episode this morning 5AM when she was walking to the bathroom. She felt like her legs were giving up, denies any dizziness, headstrike, injury to any body parts, LOC, CP, palpitation. She also endorse intermittent new onset visual hallucination (seeing  and sister when they are not there) starting Friday 4/11. She reports having chronic cough due to asthma but has also been having burning on urination. Denies fever, recent illness, abd pain, n/v/d.       ED Course: bradycardic in 40s, on 4L of NC, tachpechnic to RR of 30s. Labs showed mild hyperkalemia 5.5 (hemolyzed), glucose 338, proBNP 3648 (baseline 1896 in Feb 2025), CXR showed trace R pleural effusion.   Received solumedrol 40 mg IV, duoneb x 3 in ED.  (17 Apr 2025 17:26)    Hospital Course: The pt was admitted to medicine for further management of asthma/COPD exacerbation and new onset visual hallucination. Infectious disease was c/s and recommended stop Ertapenem and monitor off abx. Pulmonary was c/s and recommended tapering of steroids from IV methyprednisolone 60 to prednisone 40 and eventually home dose methyprednisolone. MRI of brain showed multiple chronic microhemorrhages in both cerebral hemispheres with additional chronic microhemorrhages seen in the rocky and cerebellum, mild microvascular-type changes in the periventricular and deep white matter, near complete opacification the right maxillary sinus, dilated superior ophthalmic veins are slightly increased in size from 10/27/2023, no acute intracranial pathology was seen. Neurology and psych evaluated the pt in the hospital. Per neuro, pt's episodes appear to be c/w hyponagogic hallucination. On 4/19, pt was noted to have new T wave inversion most prominently in V2, pt denied CP, trop/CKMB neg, cards c/s and reports low concern for ACS.  On 4/20, pt had a fall in the hospital in response to visual hallucination, CT showed Left periorbital hematoma with a comminuted fracture of the left medial orbital wall and resultant hemorrhagic air-fluid levels in the left ethmoid sinus, extending into the left sphenoid sinus. Pt was started on 12.5 mg quetiapine qhs per neuro and psych. Plastic surgery and ophthalmology were c/s, recommended no acute intervention, continue with nasal precaution. Per risks and discussion with the daughter, daughter would like pt to be off any AC/AP (eliquis and plavix) and follow up with PCP later to see if pt should still be on AC/AP. Pt had several episodes of hypoglycemia in the hospital. Endocrinology was c/s and down-titrated pt's insulin. Endo's discharge recs include: lantus 12 qhs and januvia 100 mg qd. Pt has remain hemodynamically stable and clinically improved. She can now be discharged with outpt PCP, plastic surgery, ophthalmology, cardiology, endocrinology follow up.     Important Medication Changes and Reason:  - Please CONTINUE taking quetiapine 12.5 mg qd qhs   - Please start Lantus 12 units qhs   - Please start Januvia 100 mg qd   - please STOP eliquis and plavix     Active or Pending Issues Requiring Follow-up:  - follow up with PCP  - follow up with plastic surgery  - follow up with opthalmology   - follow up with cardiology   - follow up with endocrinology     Advanced Directives:   [X] Full code  [ ] DNR  [ ] Hospice             HPI:  [id number] with pmhx of Epilepsy on Keppra, COPD, asthma  (on CPAP and VATS at home, on chronic steroids), DM2, bronchiectasis, tracheomalacia s/p tracheloplasty, HTN, Hx of dissection of descending thoracic aorta, hx of aortic aneurysm, Type B dissection (7/2024), medically managed initially as she did not meet criteria for surgery at that time), evaluated by Dr. Antonio, Type A dissection s/p bioAVR with Bental procedure (9/2022), severe AS s/p TAVR (9/10/2023), TIA's, DVT, Afib on Eliquis,  Colon cancer S/P resection, colostomy bag, neuropathy, Midline for IV ABX placed 2 weeks ago for chronic "TB like" pneumonia on ertapenmen (today is supposed to be the last dose at 6pm) presenting today for worsening shortness of breath over the last 7 days. Per daughter, pt does not use inhaler Q4 as instructed and when it happens, pt develops SOB even with just 5 meters of walking. Pt states that she has fall 3-4 times in the last month, with last episode this morning 5AM when she was walking to the bathroom. She felt like her legs were giving up, denies any dizziness, headstrike, injury to any body parts, LOC, CP, palpitation. She also endorse intermittent new onset visual hallucination (seeing  and sister when they are not there) starting Friday 4/11. She reports having chronic cough due to asthma but has also been having burning on urination. Denies fever, recent illness, abd pain, n/v/d.       ED Course: bradycardic in 40s, on 4L of NC, tachpechnic to RR of 30s. Labs showed mild hyperkalemia 5.5 (hemolyzed), glucose 338, proBNP 3648 (baseline 1896 in Feb 2025), CXR showed trace R pleural effusion.   Received solumedrol 40 mg IV, duoneb x 3 in ED.  (17 Apr 2025 17:26)    Hospital Course: The pt was admitted to medicine for further management of asthma/COPD exacerbation and new onset visual hallucination. Infectious disease was c/s and recommended stop Ertapenem and monitor off abx. Pulmonary was c/s and recommended tapering of steroids from IV methyprednisolone 60 to prednisone 40 and eventually home dose methyprednisolone. MRI of brain showed multiple chronic microhemorrhages in both cerebral hemispheres with additional chronic microhemorrhages seen in the rocky and cerebellum, mild microvascular-type changes in the periventricular and deep white matter, near complete opacification the right maxillary sinus, dilated superior ophthalmic veins are slightly increased in size from 10/27/2023, no acute intracranial pathology was seen. Neurology and psych evaluated the pt in the hospital. Per neuro, pt's episodes appear to be c/w hyponagogic hallucination. On 4/19, pt was noted to have new T wave inversion most prominently in V2, pt denied CP, trop/CKMB neg, cards c/s and reports low concern for ACS.  On 4/20, pt had a fall in the hospital in response to visual hallucination, CT showed Left periorbital hematoma with a comminuted fracture of the left medial orbital wall and resultant hemorrhagic air-fluid levels in the left ethmoid sinus, extending into the left sphenoid sinus. Pt was started on 12.5 mg quetiapine qhs per neuro and psych. Plastic surgery and ophthalmology were c/s, recommended no acute intervention, continue with nasal precaution. Per risks and discussion with the daughter, daughter would like pt to be off any AC/AP (eliquis and plavix) and follow up with PCP later to see if pt should still be on AC/AP. Pt had several episodes of hypoglycemia in the hospital. Endocrinology was c/s and down-titrated pt's insulin. Endo's discharge recs include: lantus 12 qhs and januvia 100 mg qd. Pt has remain hemodynamically stable and clinically improved. She can now be discharged with outpt PCP, plastic surgery, ophthalmology, cardiology, endocrinology follow up.     Important Medication Changes and Reason:  - Please CONTINUE taking quetiapine 12.5 mg qd qhs   - Please START Lantus 12 units qhs   - Please START Januvia 100 mg qd   - please STOP eliquis and plavix   - please START artificial tears every 4 hours     Active or Pending Issues Requiring Follow-up:  - follow up with PCP  - follow up with plastic surgery  - follow up with opthalmology   - follow up with cardiology   - follow up with endocrinology     Advanced Directives:   [X] Full code  [ ] DNR  [ ] Hospice             HPI:  [id number] with pmhx of Epilepsy on Keppra, COPD, asthma  (on CPAP and VATS at home, on chronic steroids), DM2, bronchiectasis, tracheomalacia s/p tracheloplasty, HTN, Hx of dissection of descending thoracic aorta, hx of aortic aneurysm, Type B dissection (7/2024), medically managed initially as she did not meet criteria for surgery at that time), evaluated by Dr. Antonio, Type A dissection s/p bioAVR with Bental procedure (9/2022), severe AS s/p TAVR (9/10/2023), TIA's, DVT, Afib on Eliquis,  Colon cancer S/P resection, colostomy bag, neuropathy, Midline for IV ABX placed 2 weeks ago for chronic "TB like" pneumonia on ertapenmen (today is supposed to be the last dose at 6pm) presenting today for worsening shortness of breath over the last 7 days. Per daughter, pt does not use inhaler Q4 as instructed and when it happens, pt develops SOB even with just 5 meters of walking. Pt states that she has fall 3-4 times in the last month, with last episode this morning 5AM when she was walking to the bathroom. She felt like her legs were giving up, denies any dizziness, headstrike, injury to any body parts, LOC, CP, palpitation. She also endorse intermittent new onset visual hallucination (seeing  and sister when they are not there) starting Friday 4/11. She reports having chronic cough due to asthma but has also been having burning on urination. Denies fever, recent illness, abd pain, n/v/d.       ED Course: bradycardic in 40s, on 4L of NC, tachpechnic to RR of 30s. Labs showed mild hyperkalemia 5.5 (hemolyzed), glucose 338, proBNP 3648 (baseline 1896 in Feb 2025), CXR showed trace R pleural effusion.   Received solumedrol 40 mg IV, duoneb x 3 in ED.  (17 Apr 2025 17:26)    Hospital Course: The pt was admitted to medicine for further management of asthma/COPD exacerbation and new onset visual hallucination. Infectious disease was c/s and recommended stop Ertapenem and monitor off abx. Pulmonary was c/s and recommended tapering of steroids from IV methyprednisolone 60 to prednisone 40 and eventually home dose methyprednisolone. MRI of brain showed multiple chronic microhemorrhages in both cerebral hemispheres with additional chronic microhemorrhages seen in the rocky and cerebellum, mild microvascular-type changes in the periventricular and deep white matter, near complete opacification the right maxillary sinus, dilated superior ophthalmic veins are slightly increased in size from 10/27/2023, no acute intracranial pathology was seen. Neurology and psych evaluated the pt in the hospital. Per neuro, pt's episodes appear to be c/w hyponagogic hallucination. On 4/19, pt was noted to have new T wave inversion most prominently in V2, pt denied CP, trop/CKMB neg, cards c/s and reports low concern for ACS.  On 4/20, pt had a fall in the hospital in response to visual hallucination, CT showed Left periorbital hematoma with a comminuted fracture of the left medial orbital wall and resultant hemorrhagic air-fluid levels in the left ethmoid sinus, extending into the left sphenoid sinus. Pt was started on 12.5 mg quetiapine qhs per neuro and psych. Plastic surgery and ophthalmology were c/s, recommended no acute intervention, continue with nasal precaution. Per risks and discussion with the daughter, daughter would like pt to be off any AC/AP (eliquis and plavix) and follow up with PCP later to see if pt should still be on AC/AP. Pt had several episodes of hypoglycemia in the hospital. Endocrinology was c/s and down-titrated pt's insulin. Endo's discharge recs include: lantus 12 qhs and januvia 100 mg qd. Pt has remain hemodynamically stable and clinically improved. She can now be discharged with outpt PCP, plastic surgery, ophthalmology, cardiology, endocrinology follow up.     Important Medication Changes and Reason:  - Please CONTINUE taking quetiapine 12.5 mg qd qhs   - Please START Lantus 12 units qhs   - Please START Januvia 100 mg qd   - please STOP eliquis and plavix   - please START artificial tears every 4 hours   - please START nifedipine 60 mg qd     Active or Pending Issues Requiring Follow-up:  - follow up with PCP  - follow up with plastic surgery  - follow up with opthalmology   - follow up with cardiology   - follow up with endocrinology     Advanced Directives:   [X] Full code  [ ] DNR  [ ] Hospice             HPI:  [id number] with pmhx of Epilepsy on Keppra, COPD, asthma  (on CPAP and VATS at home, on chronic steroids), DM2, bronchiectasis, tracheomalacia s/p tracheloplasty, HTN, Hx of dissection of descending thoracic aorta, hx of aortic aneurysm, Type B dissection (7/2024), medically managed initially as she did not meet criteria for surgery at that time), evaluated by Dr. Antonio, Type A dissection s/p bioAVR with Bental procedure (9/2022), severe AS s/p TAVR (9/10/2023), TIA's, DVT, Afib on Eliquis,  Colon cancer S/P resection, colostomy bag, neuropathy, Midline for IV ABX placed 2 weeks ago for chronic "TB like" pneumonia on ertapenmen (today is supposed to be the last dose at 6pm) presenting today for worsening shortness of breath over the last 7 days. Per daughter, pt does not use inhaler Q4 as instructed and when it happens, pt develops SOB even with just 5 meters of walking. Pt states that she has fall 3-4 times in the last month, with last episode this morning 5AM when she was walking to the bathroom. She felt like her legs were giving up, denies any dizziness, headstrike, injury to any body parts, LOC, CP, palpitation. She also endorse intermittent new onset visual hallucination (seeing  and sister when they are not there) starting Friday 4/11. She reports having chronic cough due to asthma but has also been having burning on urination. Denies fever, recent illness, abd pain, n/v/d.       ED Course: bradycardic in 40s, on 4L of NC, tachpechnic to RR of 30s. Labs showed mild hyperkalemia 5.5 (hemolyzed), glucose 338, proBNP 3648 (baseline 1896 in Feb 2025), CXR showed trace R pleural effusion.   Received solumedrol 40 mg IV, duoneb x 3 in ED.  (17 Apr 2025 17:26)    Hospital Course: The pt was admitted to medicine for further management of asthma/COPD exacerbation and new onset visual hallucination. Infectious disease was c/s and recommended stop Ertapenem and monitor off abx. Pulmonary was c/s and recommended tapering of steroids from IV methyprednisolone 60 to prednisone 40 and eventually home dose methyprednisolone. MRI of brain showed multiple chronic microhemorrhages in both cerebral hemispheres with additional chronic microhemorrhages seen in the rocky and cerebellum, mild microvascular-type changes in the periventricular and deep white matter, near complete opacification the right maxillary sinus, dilated superior ophthalmic veins are slightly increased in size from 10/27/2023, no acute intracranial pathology was seen. Neurology and psych evaluated the pt in the hospital. Per neuro, pt's episodes appear to be c/w hyponagogic hallucination. On 4/19, pt was noted to have new T wave inversion most prominently in V2, pt denied CP, trop/CKMB neg, cards c/s and reports low concern for ACS.  On 4/20, pt had a fall in the hospital in response to visual hallucination, CT showed Left periorbital hematoma with a comminuted fracture of the left medial orbital wall and resultant hemorrhagic air-fluid levels in the left ethmoid sinus, extending into the left sphenoid sinus. Pt was started on 12.5 mg quetiapine qhs per neuro and psych. Plastic surgery and ophthalmology were c/s, recommended no acute intervention, continue with nasal precaution. Per risks and discussion with the daughter, daughter would like pt to be off any AC/AP (eliquis and plavix) and follow up with PCP later to see if pt should still be on AC/AP. Pt had several episodes of hypoglycemia in the hospital. Endocrinology was c/s and down-titrated pt's insulin. Endo's discharge recs include: lantus 12 qhs and januvia 100 mg qd. Pt has remain hemodynamically stable and clinically improved. She can now be discharged with outpt PCP, plastic surgery, ophthalmology, cardiology, endocrinology follow up.     Important Medication Changes and Reason:  - Please CONTINUE taking quetiapine 12.5 mg qd qhs   - Please START Lantus 12 units qhs   - Please START Januvia 100 mg qd   - please STOP eliquis and plavix   - please START artificial tears every 4 hours   - please START nifedipine 60 mg qd     Active or Pending Issues Requiring Follow-up:  - follow up with PCP  - follow up with pulmonary   - follow up with plastic surgery  - follow up with opthalmology   - follow up with cardiology   - follow up with endocrinology     Advanced Directives:   [X] Full code  [ ] DNR  [ ] Hospice             Reason for Admission	visual hallucination, multiple falls, SOB  Hospital Course	  HPI:  74yFemale with pmhx of Epilepsy on Keppra, COPD, asthma  (on CPAP and VATS at home, on chronic steroids), DM2, bronchiectasis, tracheomalacia s/p tracheloplasty, HTN, Hx of dissection of descending thoracic aorta, hx of aortic aneurysm, Type B dissection (7/2024), medically managed initially as she did not meet criteria for surgery at that time), evaluated by Dr. Antonio, Type A dissection s/p bioAVR with Bental procedure (9/2022), severe AS s/p TAVR (9/10/2023), TIA's, DVT, Afib on Eliquis,  Colon cancer S/P resection, colostomy bag, neuropathy, Midline for IV ABX placed 2 weeks ago for chronic "TB like" pneumonia on ertapenmen (today is supposed to be the last dose at 6pm) presenting today for worsening shortness of breath over the last 7 days. Per daughter, pt does not use inhaler Q4 as instructed and when it happens, pt develops SOB even with just 5 meters of walking. Pt states that she has fall 3-4 times in the last month, with last episode this morning 5AM when she was walking to the bathroom. She felt like her legs were giving up, denies any dizziness, headstrike, injury to any body parts, LOC, CP, palpitation. She also endorse intermittent new onset visual hallucination (seeing  and sister when they are not there) starting Friday 4/11. She reports having chronic cough due to asthma but has also been having burning on urination. Denies fever, recent illness, abd pain, n/v/d.       ED Course: bradycardic in 40s, on 4L of NC, tachpechnic to RR of 30s. Labs showed mild hyperkalemia 5.5 (hemolyzed), glucose 338, proBNP 3648 (baseline 1896 in Feb 2025), CXR showed trace R pleural effusion.   Received solumedrol 40 mg IV, duoneb x 3 in ED.  (17 Apr 2025 17:26)    Hospital Course: The pt was admitted to medicine for further management of asthma/COPD exacerbation and new onset visual hallucination. Infectious disease was c/s and recommended stop Ertapenem and monitor off abx. Pulmonary was c/s and recommended tapering of steroids from IV methyprednisolone 60 to prednisone 40 and eventually home dose methyprednisolone.     MRI of brain showed multiple chronic microhemorrhages in both cerebral hemispheres with additional chronic microhemorrhages seen in the rocky and cerebellum, mild microvascular-type changes in the periventricular and deep white matter, near complete opacification the right maxillary sinus, dilated superior ophthalmic veins are slightly increased in size from 10/27/2023, no acute intracranial pathology was seen. Neurology and psych evaluated the pt in the hospital. Per neuro, pt's episodes appear to be c/w hyponagogic hallucination.     On 4/19, pt was noted to have new T wave inversion most prominently in V2, pt denied CP, trop/CKMB neg, cards c/s and reports low concern for ACS.  On 4/20, pt had a fall in the hospital in response to visual hallucination, CT showed Left periorbital hematoma with a comminuted fracture of the left medial orbital wall and resultant hemorrhagic air-fluid levels in the left ethmoid sinus, extending into the left sphenoid sinus. Pt was started on 12.5 mg quetiapine qhs per neuro and psych. Plastic surgery and ophthalmology were c/s, recommended no acute intervention, continue with nasal precaution. Per risks and discussion with the daughter, daughter would like pt to be off any AC/AP (eliquis and plavix) and follow up with PCP later to see if pt should still be on AC/AP.     Pt had several episodes of hypoglycemia in the hospital. Endocrinology was c/s and down-titrated pt's insulin. Endo's discharge recs include: lantus 12 qhs and januvia 100 mg qd. Pt has remain hemodynamically stable and clinically improved. She can now be discharged with outpt PCP, plastic surgery, ophthalmology, cardiology, endocrinology follow up.     Important Medication Changes and Reason:  - Please CONTINUE taking quetiapine 12.5 mg qd qhs   - Please START Lantus 12 units qhs   - Please START Januvia 100 mg qd   - please STOP eliquis and plavix   - please START artificial tears every 4 hours   - please START nifedipine 60 mg qd     Active or Pending Issues Requiring Follow-up:  - follow up with PCP  - follow up with pulmonary   - follow up with plastic surgery  - follow up with opthalmology   - follow up with cardiology   - follow up with endocrinology     Advanced Directives:   [X] Full code  [ ] DNR  [ ] Hospice

## 2025-04-23 NOTE — DISCHARGE NOTE PROVIDER - NSDCFUSCHEDAPPT_GEN_ALL_CORE_FT
T2 Geiseir Team 2 [place]  [facility]  INTMED  [address]  Scheduled Appointment: 04/28/2025    Rico Glover  [facility]  CARDIOLOGY [address]  Scheduled Appointment: 04/30/2025    Albert Aguilar  [facility]  [address]OLARYNG [address]  Scheduled Appointment: 05/06/2025    Afshan Stout  [facility]  INFDISEASE [address]  Scheduled Appointment: 05/12/2025    Moreno Panchal  [facility] Physician [facility]  ENDOCRIN [address]  Scheduled Appointment: 06/10/2025    [facility]  PULMMED [address]  Scheduled Appointment: 07/02/2025    Kelli Castro  [facility]  PULED [address]  Scheduled Appointment: 07/02/2025     Rico Glover  [facility] Physician [facility]  CARDIOLOGY [address]  Scheduled Appointment: 04/30/2025    [facility]  PULMMED [address]  Scheduled Appointment: 05/02/2025    Yrn Michaels  [facility] Physician [facility]  OPHTHALM [address]  Scheduled Appointment: 05/05/2025    Albert Aguilar  [facility] Physician [facility]  [address]OLARYNG [address]  Scheduled Appointment: 05/06/2025    Afshan Stout  [facility] Physician [facility]  INFDISEASE [address]  Scheduled Appointment: 05/12/2025    Griffin Callaway  [facility] Physician [facility]  PLASTICSUR [address]  Scheduled Appointment: 05/13/2025    [place]  [facility] Physician [facility]  INTMED  [address]  Scheduled Appointment: 05/27/2025    Moreno Panchal  [facility] Physician [facility]  [address]OCRIN [address]  Scheduled Appointment: 06/10/2025    [facility] Physician [facility]  PULMMED [address]  Scheduled Appointment: 07/02/2025    Kleli Castro  [facility] Physician [facility]  PULMMED [address]  Scheduled Appointment: 07/02/2025     Afshan Stout  [facility]  INFDISEASE [address]  Scheduled Appointment: 05/12/2025    Griffin Callaway  [facility] Physician [facility]  PLASTICSUR [address]  Scheduled Appointment: 05/13/2025    [place]  [facility]  INTMED  [address]  Scheduled Appointment: 05/27/2025    [facility]  PULMMED [address]  Scheduled Appointment: 05/30/2025    Moreno Panchal  [facility] Physician [facility]  ENDOCRIN [address]  Scheduled Appointment: 06/10/2025    [facility]  PULMMED [address]  Scheduled Appointment: 07/02/2025    Kelli Castro  [facility]  PULMMED [address]  Scheduled Appointment: 07/02/2025

## 2025-04-23 NOTE — PROGRESS NOTE ADULT - PROBLEM SELECTOR PLAN 7
- home meds: eliquis 5mg BID     Plan:   - pt was on eliquis 5 mg BID which was held after her fall resulting in hematoma   - c/w labetalol 100 mg TID with hold parameter   - c/w mexiletine 200 mg TID  - per plastic surgery on 4/22, clear to resume AC  - given pt's multiple falls and hematoma, will discuss with pt and HCP regarding benefits and risk of resuming AC

## 2025-04-23 NOTE — PROGRESS NOTE ADULT - PROBLEM SELECTOR PLAN 12
- severe AS s/p TAVR (9/10/2023), Type A dissection s/p bioAVR with Bental procedure (9/2022)    Plan:   - CTM   - hold plavix i.s.o fall 4/20, resume once cleared by plastic and ophthalmology - severe AS s/p TAVR (9/10/2023), Type A dissection s/p bioAVR with Bental procedure (9/2022)    Plan:   - CTM   - hold plavix i.s.o fall 4/20, resume once cleared by plastic and ophthalmology and ongoing risk and benefits discussion with family regarding whether to resume AC

## 2025-04-24 ENCOUNTER — TRANSCRIPTION ENCOUNTER (OUTPATIENT)
Age: 75
End: 2025-04-24

## 2025-04-24 VITALS — SYSTOLIC BLOOD PRESSURE: 115 MMHG | DIASTOLIC BLOOD PRESSURE: 66 MMHG | HEART RATE: 62 BPM

## 2025-04-24 LAB
ANION GAP SERPL CALC-SCNC: 13 MMOL/L — SIGNIFICANT CHANGE UP (ref 5–17)
APTT BLD: 29.6 SEC — SIGNIFICANT CHANGE UP (ref 26.1–36.8)
BUN SERPL-MCNC: 11 MG/DL — SIGNIFICANT CHANGE UP (ref 7–23)
CALCIUM SERPL-MCNC: 8.9 MG/DL — SIGNIFICANT CHANGE UP (ref 8.4–10.5)
CHLORIDE SERPL-SCNC: 105 MMOL/L — SIGNIFICANT CHANGE UP (ref 96–108)
CO2 SERPL-SCNC: 24 MMOL/L — SIGNIFICANT CHANGE UP (ref 22–31)
CREAT SERPL-MCNC: 0.65 MG/DL — SIGNIFICANT CHANGE UP (ref 0.5–1.3)
EGFR: 91 ML/MIN/1.73M2 — SIGNIFICANT CHANGE UP
EGFR: 91 ML/MIN/1.73M2 — SIGNIFICANT CHANGE UP
GLUCOSE BLDC GLUCOMTR-MCNC: 210 MG/DL — HIGH (ref 70–99)
GLUCOSE BLDC GLUCOMTR-MCNC: 229 MG/DL — HIGH (ref 70–99)
GLUCOSE BLDC GLUCOMTR-MCNC: 308 MG/DL — HIGH (ref 70–99)
GLUCOSE SERPL-MCNC: 177 MG/DL — HIGH (ref 70–99)
HCT VFR BLD CALC: 38.3 % — SIGNIFICANT CHANGE UP (ref 34.5–45)
HGB BLD-MCNC: 11.7 G/DL — SIGNIFICANT CHANGE UP (ref 11.5–15.5)
INR BLD: 1.12 RATIO — SIGNIFICANT CHANGE UP (ref 0.85–1.16)
MAGNESIUM SERPL-MCNC: 2.2 MG/DL — SIGNIFICANT CHANGE UP (ref 1.6–2.6)
MCHC RBC-ENTMCNC: 23.7 PG — LOW (ref 27–34)
MCHC RBC-ENTMCNC: 30.5 G/DL — LOW (ref 32–36)
MCV RBC AUTO: 77.5 FL — LOW (ref 80–100)
NRBC BLD AUTO-RTO: 0 /100 WBCS — SIGNIFICANT CHANGE UP (ref 0–0)
PHOSPHATE SERPL-MCNC: 2.8 MG/DL — SIGNIFICANT CHANGE UP (ref 2.5–4.5)
PLATELET # BLD AUTO: 250 K/UL — SIGNIFICANT CHANGE UP (ref 150–400)
POTASSIUM SERPL-MCNC: 3.9 MMOL/L — SIGNIFICANT CHANGE UP (ref 3.5–5.3)
POTASSIUM SERPL-SCNC: 3.9 MMOL/L — SIGNIFICANT CHANGE UP (ref 3.5–5.3)
PROTHROM AB SERPL-ACNC: 12.8 SEC — SIGNIFICANT CHANGE UP (ref 9.9–13.4)
RBC # BLD: 4.94 M/UL — SIGNIFICANT CHANGE UP (ref 3.8–5.2)
RBC # FLD: 17.1 % — HIGH (ref 10.3–14.5)
SODIUM SERPL-SCNC: 142 MMOL/L — SIGNIFICANT CHANGE UP (ref 135–145)
TROPONIN T, HIGH SENSITIVITY RESULT: 33 NG/L — SIGNIFICANT CHANGE UP (ref 0–51)
WBC # BLD: 8.43 K/UL — SIGNIFICANT CHANGE UP (ref 3.8–10.5)
WBC # FLD AUTO: 8.43 K/UL — SIGNIFICANT CHANGE UP (ref 3.8–10.5)

## 2025-04-24 PROCEDURE — G0545: CPT

## 2025-04-24 PROCEDURE — 99232 SBSQ HOSP IP/OBS MODERATE 35: CPT

## 2025-04-24 PROCEDURE — 99239 HOSP IP/OBS DSCHRG MGMT >30: CPT

## 2025-04-24 RX ORDER — HYPROMELLOSE 0.4 %
1 DROPS OPHTHALMIC (EYE)
Qty: 1 | Refills: 0
Start: 2025-04-24 | End: 2025-05-23

## 2025-04-24 RX ORDER — HYPROMELLOSE 0.4 %
1 DROPS OPHTHALMIC (EYE) EVERY 4 HOURS
Refills: 0 | Status: DISCONTINUED | OUTPATIENT
Start: 2025-04-24 | End: 2025-04-24

## 2025-04-24 RX ORDER — SODIUM CHLORIDE 9 G/1000ML
500 INJECTION, SOLUTION INTRAVENOUS ONCE
Refills: 0 | Status: COMPLETED | OUTPATIENT
Start: 2025-04-24 | End: 2025-04-24

## 2025-04-24 RX ORDER — INSULIN GLARGINE-YFGN 100 [IU]/ML
14 INJECTION, SOLUTION SUBCUTANEOUS AT BEDTIME
Refills: 0 | Status: DISCONTINUED | OUTPATIENT
Start: 2025-04-24 | End: 2025-04-24

## 2025-04-24 RX ORDER — INSULIN LISPRO 100 U/ML
0 INJECTION, SOLUTION INTRAVENOUS; SUBCUTANEOUS
Refills: 0 | DISCHARGE

## 2025-04-24 RX ORDER — INSULIN LISPRO 100 U/ML
4 INJECTION, SOLUTION INTRAVENOUS; SUBCUTANEOUS
Refills: 0 | Status: DISCONTINUED | OUTPATIENT
Start: 2025-04-24 | End: 2025-04-24

## 2025-04-24 RX ORDER — SERTRALINE 100 MG/1
1 TABLET, FILM COATED ORAL
Qty: 30 | Refills: 0
Start: 2025-04-24 | End: 2025-05-23

## 2025-04-24 RX ORDER — TRAMADOL HYDROCHLORIDE 50 MG/1
1 TABLET, FILM COATED ORAL
Refills: 0 | DISCHARGE

## 2025-04-24 RX ORDER — INSULIN GLARGINE-YFGN 100 [IU]/ML
12 INJECTION, SOLUTION SUBCUTANEOUS
Qty: 2 | Refills: 0
Start: 2025-04-24 | End: 2025-05-23

## 2025-04-24 RX ORDER — NIFEDIPINE 30 MG
1 TABLET, EXTENDED RELEASE 24 HR ORAL
Qty: 30 | Refills: 0
Start: 2025-04-24 | End: 2025-05-23

## 2025-04-24 RX ORDER — INSULIN GLARGINE-YFGN 100 [IU]/ML
28 INJECTION, SOLUTION SUBCUTANEOUS
Qty: 2 | Refills: 0 | DISCHARGE
Start: 2025-04-24 | End: 2025-05-23

## 2025-04-24 RX ORDER — SITAGLIPTIN 100 MG/1
1 TABLET, FILM COATED ORAL
Qty: 30 | Refills: 0
Start: 2025-04-24 | End: 2025-05-23

## 2025-04-24 RX ORDER — QUETIAPINE FUMARATE 25 MG/1
0.5 TABLET ORAL
Qty: 15 | Refills: 0
Start: 2025-04-24 | End: 2025-05-23

## 2025-04-24 RX ORDER — SERTRALINE 100 MG/1
50 TABLET, FILM COATED ORAL DAILY
Refills: 0 | Status: DISCONTINUED | OUTPATIENT
Start: 2025-04-24 | End: 2025-04-24

## 2025-04-24 RX ADMIN — MEXILETINE HYDROCHLORIDE 200 MILLIGRAM(S): 250 CAPSULE ORAL at 13:56

## 2025-04-24 RX ADMIN — INSULIN LISPRO 2 UNIT(S): 100 INJECTION, SOLUTION INTRAVENOUS; SUBCUTANEOUS at 12:22

## 2025-04-24 RX ADMIN — Medication 40 MILLIGRAM(S): at 06:21

## 2025-04-24 RX ADMIN — Medication 30 MILLILITER(S): at 12:26

## 2025-04-24 RX ADMIN — Medication 20 MILLIGRAM(S): at 12:11

## 2025-04-24 RX ADMIN — GLYCOPYRROLATE 1 MILLIGRAM(S): 0.2 INJECTION INTRAMUSCULAR; INTRAVENOUS at 12:27

## 2025-04-24 RX ADMIN — GLYCOPYRROLATE 1 MILLIGRAM(S): 0.2 INJECTION INTRAMUSCULAR; INTRAVENOUS at 06:12

## 2025-04-24 RX ADMIN — LACOSAMIDE 100 MILLIGRAM(S): 150 TABLET, FILM COATED ORAL at 17:26

## 2025-04-24 RX ADMIN — LACOSAMIDE 100 MILLIGRAM(S): 150 TABLET, FILM COATED ORAL at 06:13

## 2025-04-24 RX ADMIN — IPRATROPIUM BROMIDE AND ALBUTEROL SULFATE 3 MILLILITER(S): .5; 2.5 SOLUTION RESPIRATORY (INHALATION) at 12:21

## 2025-04-24 RX ADMIN — INSULIN LISPRO 4: 100 INJECTION, SOLUTION INTRAVENOUS; SUBCUTANEOUS at 17:05

## 2025-04-24 RX ADMIN — INSULIN LISPRO 2: 100 INJECTION, SOLUTION INTRAVENOUS; SUBCUTANEOUS at 12:22

## 2025-04-24 RX ADMIN — SERTRALINE 50 MILLIGRAM(S): 100 TABLET, FILM COATED ORAL at 12:10

## 2025-04-24 RX ADMIN — IPRATROPIUM BROMIDE AND ALBUTEROL SULFATE 3 MILLILITER(S): .5; 2.5 SOLUTION RESPIRATORY (INHALATION) at 00:38

## 2025-04-24 RX ADMIN — METHYLPREDNISOLONE ACETATE 8 MILLIGRAM(S): 80 INJECTION, SUSPENSION INTRA-ARTICULAR; INTRALESIONAL; INTRAMUSCULAR; SOFT TISSUE at 06:12

## 2025-04-24 RX ADMIN — INSULIN LISPRO 2: 100 INJECTION, SOLUTION INTRAVENOUS; SUBCUTANEOUS at 08:50

## 2025-04-24 RX ADMIN — Medication 1 TABLET(S): at 12:10

## 2025-04-24 RX ADMIN — POLYETHYLENE GLYCOL 3350 17 GRAM(S): 17 POWDER, FOR SOLUTION ORAL at 12:27

## 2025-04-24 RX ADMIN — SODIUM CHLORIDE 500 MILLILITER(S): 9 INJECTION, SOLUTION INTRAVENOUS at 13:45

## 2025-04-24 RX ADMIN — LEVETIRACETAM 1000 MILLIGRAM(S): 10 INJECTION, SOLUTION INTRAVENOUS at 06:12

## 2025-04-24 RX ADMIN — Medication 1 APPLICATION(S): at 12:28

## 2025-04-24 RX ADMIN — LEVETIRACETAM 1000 MILLIGRAM(S): 10 INJECTION, SOLUTION INTRAVENOUS at 17:27

## 2025-04-24 RX ADMIN — Medication 500 MILLIGRAM(S): at 12:10

## 2025-04-24 RX ADMIN — LABETALOL HYDROCHLORIDE 100 MILLIGRAM(S): 200 TABLET, FILM COATED ORAL at 06:12

## 2025-04-24 RX ADMIN — Medication 1 DROP(S): at 15:32

## 2025-04-24 RX ADMIN — Medication 325 MILLIGRAM(S): at 12:10

## 2025-04-24 RX ADMIN — MEXILETINE HYDROCHLORIDE 200 MILLIGRAM(S): 250 CAPSULE ORAL at 06:13

## 2025-04-24 RX ADMIN — TIOTROPIUM BROMIDE INHALATION SPRAY 2 PUFF(S): 3.12 SPRAY, METERED RESPIRATORY (INHALATION) at 12:22

## 2025-04-24 NOTE — PROGRESS NOTE ADULT - PROBLEM SELECTOR PLAN 8
Plan:   - hold eliquis 5 mg BID i.s.o fall on 4/20   - will resume eliquis 5mg BID once cleared by plastic and ophthalmology  - per plastic surgery on 4/22, clear to resume AC  - given pt's multiple falls and hematoma, will discuss with pt and HCP regarding benefits and risk of resuming AC Plan:   - hold eliquis 5 mg BID i.s.o fall on 4/20   - will resume eliquis 5mg BID once cleared by plastic and ophthalmology  - per plastic surgery on 4/22, clear to resume AC  - given pt's multiple falls and hematoma, will discuss with pt and HCP regarding benefits and risk of resuming AC - daughter would like to hold eliquis and plavix, will f/u outpt to see if and when is appropraite for resuming them

## 2025-04-24 NOTE — PROGRESS NOTE ADULT - SUBJECTIVE AND OBJECTIVE BOX
[facility] - Le Jung, Jim Pablo, Sridhar Newsome  Office Number:  [phone]    Patient resting comfortably in bed in NAD.  Laying flat with no respiratory distress.  No complaints of chest pain, dyspnea, palpitations, PND, or orthopnea.    ROS: negative unless otherwise mentioned.    Telemetry:  off    MEDICATIONS  (STANDING):  albuterol/ipratropium for Nebulization 3 milliLiter(s) Nebulizer every 6 hours  ascorbic acid 500 milliGRAM(s) Oral daily  buDESOnide    Inhalation Suspension 1 milliGRAM(s) Inhalation every 12 hours  chlorhexidine 2% Cloths 1 Application(s) Topical daily  dextrose 5%. 1000 milliLiter(s) (50 mL/Hr) IV Continuous <Continuous>  dextrose 5%. 1000 milliLiter(s) (100 mL/Hr) IV Continuous <Continuous>  dextrose 50% Injectable 25 Gram(s) IV Push once  dextrose 50% Injectable 12.5 Gram(s) IV Push once  dextrose 50% Injectable 25 Gram(s) IV Push once  ferrous    sulfate 325 milliGRAM(s) Oral daily  fluticasone propionate 50 MICROgram(s)/spray Nasal Spray 1 Spray(s) Both Nostrils two times a day  fluticasone propionate/ salmeterol 250-50 MICROgram(s) Diskus 1 Dose(s) Inhalation two times a day  formoterol for nebulization 20 MICROGram(s) Nebulizer two times a day  glucagon  Injectable 1 milliGRAM(s) IntraMuscular once  glycopyrrolate 1 milliGRAM(s) Oral three times a day  insulin glargine Injectable (LANTUS) 12 Unit(s) SubCutaneous at bedtime  insulin lispro (ADMELOG) corrective regimen sliding scale   SubCutaneous three times a day before meals  insulin lispro (ADMELOG) corrective regimen sliding scale   SubCutaneous at bedtime  insulin lispro Injectable (ADMELOG) 2 Unit(s) SubCutaneous three times a day with meals  labetalol 100 milliGRAM(s) Oral every 8 hours  lacosamide 100 milliGRAM(s) Oral two times a day  levETIRAcetam 1000 milliGRAM(s) Oral two times a day  megestrol 20 milliGRAM(s) Oral daily  melatonin 3 milliGRAM(s) Oral at bedtime  methylPREDNISolone 8 milliGRAM(s) Oral daily  mexiletine 200 milliGRAM(s) Oral three times a day  mineral oil 30 milliLiter(s) Oral daily  montelukast 10 milliGRAM(s) Oral at bedtime  multivitamin 1 Tablet(s) Oral daily  mupirocin 2% Nasal 1 Application(s) Both Nostrils two times a day  NIFEdipine XL 60 milliGRAM(s) Oral daily  Ohtuvayre [Ensifentrine] 3mG/2.5mL Nebul 3 milliGRAM(s) 3 milliGRAM(s) Nebulizer two times a day  pantoprazole    Tablet 40 milliGRAM(s) Oral before breakfast  polyethylene glycol 3350 17 Gram(s) Oral daily  QUEtiapine 12.5 milliGRAM(s) Oral at bedtime  rosuvastatin 5 milliGRAM(s) Oral at bedtime  senna 2 Tablet(s) Oral at bedtime  sodium chloride 7% Inhalation 4 milliLiter(s) Inhalation every 12 hours  tiotropium 2.5 MICROgram(s) Inhaler 2 Puff(s) Inhalation daily    MEDICATIONS  (PRN):  dextrose Oral Gel 15 Gram(s) Oral once PRN Blood Glucose LESS THAN 70 milliGRAM(s)/deciliter  dextrose Oral Gel 15 Gram(s) Oral once PRN Blood Glucose LESS THAN 70 milliGRAM(s)/deciliter  magnesium citrate Oral Solution 296 milliLiter(s) Oral every 24 hours PRN Constipation  sertraline 50 milliGRAM(s) Oral daily PRN for anxiety      Allergies    aspirin (Unknown)  codeine (Unknown)  codeine (Short breath)  Valium (Unknown)  cefepime (Short breath (Severe))  shellfish (Anaphylaxis)  penicillin (Anaphylaxis)  cefepime (Anaphylaxis)  penicillin (Unknown)  Percocet (Unknown)  Avelox (Short breath; Pruritus)  Dilaudid (Short breath)  ampicillin (Unknown)  Valium (Short breath)  OxyContin (Unknown)  aspirin (Short breath)  iodine (Short breath; Swelling)  tetanus toxoid (Short breath)  Avelox (Unknown)  meropenem (Unknown)    Intolerances        Vital Signs Last 24 Hrs  T(C): 36.9 (24 Apr 2025 06:01), Max: 37.4 (23 Apr 2025 11:25)  T(F): 98.4 (24 Apr 2025 06:01), Max: 99.3 (23 Apr 2025 11:25)  HR: 71 (24 Apr 2025 08:59) (54 - 82)  BP: 109/61 (24 Apr 2025 06:01) (97/56 - 116/66)  BP(mean): --  RR: 18 (24 Apr 2025 06:01) (18 - 18)  SpO2: 94% (24 Apr 2025 08:59) (93% - 97%)    Parameters below as of 24 Apr 2025 06:01  Patient On (Oxygen Delivery Method): room air        I&O's Summary      ON EXAM:    General: NAD, awake and alert, oriented x 3  HEENT: Mucous membranes are moist, anicteric  Lungs: Non-labored, breath sounds are clear bilaterally, No wheezing, rales or rhonchi  Cardiovascular: Regular, S1 and S2, no murmurs, rubs, or gallops  Gastrointestinal: Bowel Sounds present, soft, nontender.   Lymph: No peripheral edema. No lymphadenopathy.  Skin: No rashes or ulcers  Psych:  Mood & affect appropriate      LABS: All Labs Reviewed:                        11.7   8.43  )-----------( 250      ( 24 Apr 2025 07:14 )             38.3                         11.4   10.60 )-----------( 249      ( 23 Apr 2025 07:01 )             37.2                         11.5   12.53 )-----------( 296      ( 22 Apr 2025 06:58 )             36.9     24 Apr 2025 07:07    142    |  105    |  11     ----------------------------<  177    3.9     |  24     |  0.65   23 Apr 2025 07:00    142    |  105    |  14     ----------------------------<  139    4.1     |  23     |  0.73   22 Apr 2025 06:58    145    |  109    |  15     ----------------------------<  71     3.7     |  24     |  0.78     Ca    8.9        24 Apr 2025 07:07  Ca    8.9        23 Apr 2025 07:00  Ca    9.1        22 Apr 2025 06:58  Phos  2.8       24 Apr 2025 07:07  Phos  2.3       23 Apr 2025 07:00  Phos  2.7       22 Apr 2025 06:58  Mg     2.2       24 Apr 2025 07:07  Mg     2.2       23 Apr 2025 07:00  Mg     2.3       22 Apr 2025 06:58    TPro  6.1    /  Alb  3.5    /  TBili  0.3    /  DBili  x      /  AST  16     /  ALT  25     /  AlkPhos  74     23 Apr 2025 07:00  TPro  6.2    /  Alb  3.6    /  TBili  0.3    /  DBili  x      /  AST  19     /  ALT  31     /  AlkPhos  81     22 Apr 2025 06:58    PT/INR - ( 24 Apr 2025 07:14 )   PT: 12.8 sec;   INR: 1.12 ratio         PTT - ( 24 Apr 2025 07:14 )  PTT:29.6 sec  CARDIAC MARKERS ( 23 Apr 2025 17:00 )  x     / x     / x     / x     / 1.9 ng/mL      Blood Culture:

## 2025-04-24 NOTE — PROGRESS NOTE ADULT - PROBLEM SELECTOR PLAN 9
- Pt takes lantus 22 in AM, 28 at night    Plan:   - check A1C - 8   - place pt back at home dose given likely will have hyperglycemia from steroid - lantus 22 in AM and 28 at night -> switched to lantus 14 AM/14 PM and lantus 10 AM/10PM   - FS premeal and qhs   - nutrition c/s  - pt had multiple episodes of hypoglycemic down titrating her insulin   - Endo c/s, appreciate recs - stop AM lantus, start lantus 12 qhs, admelog 2 units TID, low ISS

## 2025-04-24 NOTE — PROGRESS NOTE ADULT - PROBLEM SELECTOR PROBLEM 10
The patient left a voicemail. It is an update from the 6/28/23. She states that she feels \"like some of the wheezing has stopped.\" Her voice has changed. She continues to have pain in the upper chest, side and abdomen when she inhales deep or cough. She has been using the two water pills and the Breo Ellipta inhaler. She is wondering \"what direction to go now?\"   Hypertension

## 2025-04-24 NOTE — PROGRESS NOTE ADULT - SUBJECTIVE AND OBJECTIVE BOX
*******************************  Hien Chavez, PGY-1  Internal Medicine  Contact via [employer] TEAMS    *******************************    PROGRESS NOTE:     Patient is a 74y old  Female who presents with a chief complaint of visual hallucination, multiple falls, SOB (23 Apr 2025 16:02)      INTERVAL EVENTS: No acute overnight events.     SUBJECTIVE: Patient seen and examined at bedside. This morning, the patient is comfortable and doing well. No acute complaints. Denies fevers, chills, N/V/D, chest pain, SOB, abdominal pain.    MEDICATIONS  (STANDING):  albuterol/ipratropium for Nebulization 3 milliLiter(s) Nebulizer every 6 hours  ascorbic acid 500 milliGRAM(s) Oral daily  buDESOnide    Inhalation Suspension 1 milliGRAM(s) Inhalation every 12 hours  chlorhexidine 2% Cloths 1 Application(s) Topical daily  dextrose 5%. 1000 milliLiter(s) (50 mL/Hr) IV Continuous <Continuous>  dextrose 5%. 1000 milliLiter(s) (100 mL/Hr) IV Continuous <Continuous>  dextrose 50% Injectable 25 Gram(s) IV Push once  dextrose 50% Injectable 12.5 Gram(s) IV Push once  dextrose 50% Injectable 25 Gram(s) IV Push once  ferrous    sulfate 325 milliGRAM(s) Oral daily  fluticasone propionate 50 MICROgram(s)/spray Nasal Spray 1 Spray(s) Both Nostrils two times a day  fluticasone propionate/ salmeterol 250-50 MICROgram(s) Diskus 1 Dose(s) Inhalation two times a day  formoterol for nebulization 20 MICROGram(s) Nebulizer two times a day  glucagon  Injectable 1 milliGRAM(s) IntraMuscular once  glycopyrrolate 1 milliGRAM(s) Oral three times a day  insulin glargine Injectable (LANTUS) 12 Unit(s) SubCutaneous at bedtime  insulin lispro (ADMELOG) corrective regimen sliding scale   SubCutaneous three times a day before meals  insulin lispro (ADMELOG) corrective regimen sliding scale   SubCutaneous at bedtime  insulin lispro Injectable (ADMELOG) 2 Unit(s) SubCutaneous three times a day with meals  labetalol 100 milliGRAM(s) Oral every 8 hours  lacosamide 100 milliGRAM(s) Oral two times a day  levETIRAcetam 1000 milliGRAM(s) Oral two times a day  megestrol 20 milliGRAM(s) Oral daily  melatonin 3 milliGRAM(s) Oral at bedtime  methylPREDNISolone 8 milliGRAM(s) Oral daily  mexiletine 200 milliGRAM(s) Oral three times a day  mineral oil 30 milliLiter(s) Oral daily  montelukast 10 milliGRAM(s) Oral at bedtime  multivitamin 1 Tablet(s) Oral daily  mupirocin 2% Nasal 1 Application(s) Both Nostrils two times a day  NIFEdipine XL 60 milliGRAM(s) Oral daily  Ohtuvayre [Ensifentrine] 3mG/2.5mL Nebul 3 milliGRAM(s) 3 milliGRAM(s) Nebulizer two times a day  pantoprazole    Tablet 40 milliGRAM(s) Oral before breakfast  polyethylene glycol 3350 17 Gram(s) Oral daily  QUEtiapine 12.5 milliGRAM(s) Oral at bedtime  rosuvastatin 5 milliGRAM(s) Oral at bedtime  senna 2 Tablet(s) Oral at bedtime  sodium chloride 7% Inhalation 4 milliLiter(s) Inhalation every 12 hours  tiotropium 2.5 MICROgram(s) Inhaler 2 Puff(s) Inhalation daily    MEDICATIONS  (PRN):  dextrose Oral Gel 15 Gram(s) Oral once PRN Blood Glucose LESS THAN 70 milliGRAM(s)/deciliter  dextrose Oral Gel 15 Gram(s) Oral once PRN Blood Glucose LESS THAN 70 milliGRAM(s)/deciliter  magnesium citrate Oral Solution 296 milliLiter(s) Oral every 24 hours PRN Constipation  sertraline 50 milliGRAM(s) Oral daily PRN for anxiety      CAPILLARY BLOOD GLUCOSE      POCT Blood Glucose.: 157 mg/dL (23 Apr 2025 21:37)  POCT Blood Glucose.: 330 mg/dL (23 Apr 2025 17:24)  POCT Blood Glucose.: 154 mg/dL (23 Apr 2025 11:47)  POCT Blood Glucose.: 185 mg/dL (23 Apr 2025 07:59)    I&O's Summary      PHYSICAL EXAM:  Vital Signs Last 24 Hrs  T(C): 36.9 (24 Apr 2025 06:01), Max: 37.4 (23 Apr 2025 11:25)  T(F): 98.4 (24 Apr 2025 06:01), Max: 99.3 (23 Apr 2025 11:25)  HR: 65 (24 Apr 2025 06:01) (54 - 82)  BP: 109/61 (24 Apr 2025 06:01) (97/56 - 116/66)  BP(mean): --  RR: 18 (24 Apr 2025 06:01) (18 - 18)  SpO2: 96% (24 Apr 2025 06:01) (93% - 97%)    Parameters below as of 24 Apr 2025 06:01  Patient On (Oxygen Delivery Method): room air        GENERAL: NAD, lying in bed comfortably  HEAD: Atraumatic, normocephalic  EYES: EOMI, PERRLA, conjunctiva and sclera clear  ENT: Moist mucous membranes  NECK: Supple, no JVD  HEART: S1, S2, Regular rate and rhythm, no murmurs, rubs, or gallops  LUNGS: Unlabored respirations, clear to auscultation bilaterally, no crackles, wheezing, or rhonchi  ABDOMEN: Soft, nontender, nondistended, +BS  EXTREMITIES: 2+ peripheral pulses bilaterally. No clubbing, cyanosis, or edema  NERVOUS SYSTEM:  A&Ox3, no focal deficits   SKIN: No rashes or lesions    LABS:                        11.4   10.60 )-----------( 249      ( 23 Apr 2025 07:01 )             37.2     04-23    142  |  105  |  14  ----------------------------<  139[H]  4.1   |  23  |  0.73    Ca    8.9      23 Apr 2025 07:00  Phos  2.3     04-23  Mg     2.2     04-23    TPro  6.1  /  Alb  3.5  /  TBili  0.3  /  DBili  x   /  AST  16  /  ALT  25  /  AlkPhos  74  04-23    PT/INR - ( 23 Apr 2025 07:01 )   PT: 12.9 sec;   INR: 1.12 ratio         PTT - ( 23 Apr 2025 07:01 )  PTT:29.2 sec  CARDIAC MARKERS ( 23 Apr 2025 17:00 )  x     / x     / x     / x     / 1.9 ng/mL      Urinalysis Basic - ( 23 Apr 2025 07:00 )    Color: x / Appearance: x / SG: x / pH: x  Gluc: 139 mg/dL / Ketone: x  / Bili: x / Urobili: x   Blood: x / Protein: x / Nitrite: x   Leuk Esterase: x / RBC: x / WBC x   Sq Epi: x / Non Sq Epi: x / Bacteria: x          RADIOLOGY & ADDITIONAL TESTS:  Results Reviewed:   Imaging Personally Reviewed:  Electrocardiogram Personally Reviewed:  Tele: *******************************  Hien Chavez, PGY-1  Internal Medicine  Contact via [employer] TEAMS    *******************************    PROGRESS NOTE:     Patient is a 74y old  Female who presents with a chief complaint of visual hallucination, multiple falls, SOB (23 Apr 2025 16:02)      INTERVAL EVENTS: No acute overnight events.     SUBJECTIVE: Patient seen and examined at bedside. This morning, the patient is sleepy but arousable, denied any acute complaints.     MEDICATIONS  (STANDING):  albuterol/ipratropium for Nebulization 3 milliLiter(s) Nebulizer every 6 hours  ascorbic acid 500 milliGRAM(s) Oral daily  buDESOnide    Inhalation Suspension 1 milliGRAM(s) Inhalation every 12 hours  chlorhexidine 2% Cloths 1 Application(s) Topical daily  dextrose 5%. 1000 milliLiter(s) (50 mL/Hr) IV Continuous <Continuous>  dextrose 5%. 1000 milliLiter(s) (100 mL/Hr) IV Continuous <Continuous>  dextrose 50% Injectable 25 Gram(s) IV Push once  dextrose 50% Injectable 12.5 Gram(s) IV Push once  dextrose 50% Injectable 25 Gram(s) IV Push once  ferrous    sulfate 325 milliGRAM(s) Oral daily  fluticasone propionate 50 MICROgram(s)/spray Nasal Spray 1 Spray(s) Both Nostrils two times a day  fluticasone propionate/ salmeterol 250-50 MICROgram(s) Diskus 1 Dose(s) Inhalation two times a day  formoterol for nebulization 20 MICROGram(s) Nebulizer two times a day  glucagon  Injectable 1 milliGRAM(s) IntraMuscular once  glycopyrrolate 1 milliGRAM(s) Oral three times a day  insulin glargine Injectable (LANTUS) 12 Unit(s) SubCutaneous at bedtime  insulin lispro (ADMELOG) corrective regimen sliding scale   SubCutaneous three times a day before meals  insulin lispro (ADMELOG) corrective regimen sliding scale   SubCutaneous at bedtime  insulin lispro Injectable (ADMELOG) 2 Unit(s) SubCutaneous three times a day with meals  labetalol 100 milliGRAM(s) Oral every 8 hours  lacosamide 100 milliGRAM(s) Oral two times a day  levETIRAcetam 1000 milliGRAM(s) Oral two times a day  megestrol 20 milliGRAM(s) Oral daily  melatonin 3 milliGRAM(s) Oral at bedtime  methylPREDNISolone 8 milliGRAM(s) Oral daily  mexiletine 200 milliGRAM(s) Oral three times a day  mineral oil 30 milliLiter(s) Oral daily  montelukast 10 milliGRAM(s) Oral at bedtime  multivitamin 1 Tablet(s) Oral daily  mupirocin 2% Nasal 1 Application(s) Both Nostrils two times a day  NIFEdipine XL 60 milliGRAM(s) Oral daily  Ohtuvayre [Ensifentrine] 3mG/2.5mL Nebul 3 milliGRAM(s) 3 milliGRAM(s) Nebulizer two times a day  pantoprazole    Tablet 40 milliGRAM(s) Oral before breakfast  polyethylene glycol 3350 17 Gram(s) Oral daily  QUEtiapine 12.5 milliGRAM(s) Oral at bedtime  rosuvastatin 5 milliGRAM(s) Oral at bedtime  senna 2 Tablet(s) Oral at bedtime  sodium chloride 7% Inhalation 4 milliLiter(s) Inhalation every 12 hours  tiotropium 2.5 MICROgram(s) Inhaler 2 Puff(s) Inhalation daily    MEDICATIONS  (PRN):  dextrose Oral Gel 15 Gram(s) Oral once PRN Blood Glucose LESS THAN 70 milliGRAM(s)/deciliter  dextrose Oral Gel 15 Gram(s) Oral once PRN Blood Glucose LESS THAN 70 milliGRAM(s)/deciliter  magnesium citrate Oral Solution 296 milliLiter(s) Oral every 24 hours PRN Constipation  sertraline 50 milliGRAM(s) Oral daily PRN for anxiety      CAPILLARY BLOOD GLUCOSE      POCT Blood Glucose.: 157 mg/dL (23 Apr 2025 21:37)  POCT Blood Glucose.: 330 mg/dL (23 Apr 2025 17:24)  POCT Blood Glucose.: 154 mg/dL (23 Apr 2025 11:47)  POCT Blood Glucose.: 185 mg/dL (23 Apr 2025 07:59)    I&O's Summary      PHYSICAL EXAM:  Vital Signs Last 24 Hrs  T(C): 36.9 (24 Apr 2025 06:01), Max: 37.4 (23 Apr 2025 11:25)  T(F): 98.4 (24 Apr 2025 06:01), Max: 99.3 (23 Apr 2025 11:25)  HR: 65 (24 Apr 2025 06:01) (54 - 82)  BP: 109/61 (24 Apr 2025 06:01) (97/56 - 116/66)  BP(mean): --  RR: 18 (24 Apr 2025 06:01) (18 - 18)  SpO2: 96% (24 Apr 2025 06:01) (93% - 97%)    Parameters below as of 24 Apr 2025 06:01  Patient On (Oxygen Delivery Method): room air        GENERAL: NAD  HEAD: timi-orbital bruises noted in the L eye   EYES: L prosthetic eye   HEART: S1, S2, Regular rate and rhythm, no murmurs, rubs, or gallops  LUNGS: Unlabored respirations, clear to auscultation bilaterally  ABDOMEN: Soft, nontender, nondistended  EXTREMITIES: + TTP in b/l LE, no pitting edema   NERVOUS SYSTEM:  A&Ox3      LABS:                        11.4   10.60 )-----------( 249      ( 23 Apr 2025 07:01 )             37.2     04-23    142  |  105  |  14  ----------------------------<  139[H]  4.1   |  23  |  0.73    Ca    8.9      23 Apr 2025 07:00  Phos  2.3     04-23  Mg     2.2     04-23    TPro  6.1  /  Alb  3.5  /  TBili  0.3  /  DBili  x   /  AST  16  /  ALT  25  /  AlkPhos  74  04-23    PT/INR - ( 23 Apr 2025 07:01 )   PT: 12.9 sec;   INR: 1.12 ratio         PTT - ( 23 Apr 2025 07:01 )  PTT:29.2 sec  CARDIAC MARKERS ( 23 Apr 2025 17:00 )  x     / x     / x     / x     / 1.9 ng/mL      Urinalysis Basic - ( 23 Apr 2025 07:00 )    Color: x / Appearance: x / SG: x / pH: x  Gluc: 139 mg/dL / Ketone: x  / Bili: x / Urobili: x   Blood: x / Protein: x / Nitrite: x   Leuk Esterase: x / RBC: x / WBC x   Sq Epi: x / Non Sq Epi: x / Bacteria: x          RADIOLOGY & ADDITIONAL TESTS:  Results Reviewed:   Imaging Personally Reviewed:  Electrocardiogram Personally Reviewed:  Tele:

## 2025-04-24 NOTE — PROGRESS NOTE ADULT - NUTRITIONAL ASSESSMENT
Diet, Consistent Carbohydrate w/Evening Snack:   Low Sodium  Supplement Feeding Modality:  Oral  Glucerna Shake Cans or Servings Per Day:  1       Frequency:  Three Times a day (04-24-25 @ 13:41)
Diet, Consistent Carbohydrate/No Snacks:   DASH/TLC {Sodium & Cholesterol Restricted} (DASH)  Supplement Feeding Modality:  Oral  Glucerna Shake Cans or Servings Per Day:  3       Frequency:  Three Times a day (04-18-25 @ 11:44) [Active]      Please see RD assessment and/or follow up.  Managed by primary team as well
This patient has been assessed with a concern for Malnutrition and has been determined to have a diagnosis/diagnoses of Severe protein-calorie malnutrition.    This patient is being managed with:   Diet Consistent Carbohydrate/No Snacks-  DASH/TLC {Sodium & Cholesterol Restricted} (DASH)  Supplement Feeding Modality:  Oral  Glucerna Shake Cans or Servings Per Day:  3       Frequency:  Three Times a day  Entered: Apr 18 2025 11:43AM  
Diet, Consistent Carbohydrate/No Snacks:   DASH/TLC {Sodium & Cholesterol Restricted} (DASH)  Supplement Feeding Modality:  Oral  Glucerna Shake Cans or Servings Per Day:  3       Frequency:  Three Times a day (04-18-25 @ 11:44) [Active]      Please see RD assessment and/or follow up.  Managed by primary team as well
This patient has been assessed with a concern for Malnutrition and has been determined to have a diagnosis/diagnoses of Severe protein-calorie malnutrition.    This patient is being managed with:   Diet Consistent Carbohydrate/No Snacks-  DASH/TLC {Sodium & Cholesterol Restricted} (DASH)  Supplement Feeding Modality:  Oral  Glucerna Shake Cans or Servings Per Day:  3       Frequency:  Three Times a day  Entered: Apr 18 2025 11:43AM  

## 2025-04-24 NOTE — PROGRESS NOTE ADULT - PROBLEM SELECTOR PROBLEM 2
COPD with asthma
Current chronic use of systemic steroids
COPD with asthma
Current chronic use of systemic steroids
COPD with asthma
COPD with asthma
Current chronic use of systemic steroids
COPD with asthma

## 2025-04-24 NOTE — PROGRESS NOTE ADULT - ASSESSMENT
The patient is 75 y/o woman with PMH of epilepsy, COPD, DM, bronchiectasis, tracheomalacia s/p tracheloplasty, HTN, hx of dissection of descending thoracic aorta, hx of aortic aneurysm, type B dissection (7/2024), type A dissection s/p bio AVR with Bentall procedure (9/2022), severe AS s/p TAVR (9/10/2023), TIA's, Afib on Eliquis, colon cancer s/p colostomy, who presents to the ED for fever. Patient reports having cough for past 2-3 days, associated with fever. Patient endorses increased weakness and fatigue. Patient has hx of recurrent PNA  Doxycycline prescribed by PCP. Afebrile in ED. Work up shows: WBC 10.58, Cr 0.77, Lactate 1.3, Procalcitonin 0.08.       - Noted numerous allergies listed to PCN,   cefepime etc. Known hx of aortic aneurysm and dissection. (contra-indication to FQs). Seizure disorder - caution with carbapenems needed.  pt completing a 14 day course of invanz for an ESBL proteus in the sputum given via a pICC line    current admission does not demonstrate a new pna but she has had visual hallucinations    no fever or chills      Impression  # COPD    s/p Invanz for 14 days   Recent fall ( hs of seizures)  Underlying colon cancer,  AVR, aortic aneurysm,   CT noted fracture and hematoma of sinus     Recommendations:     stable off ab      MANY ALLERGIES LISTED INCLUDING CEPHALOSPORINS AND PENICILLINS     -Supplemental oxygen as needed to maintain SpO2 88-92%

## 2025-04-24 NOTE — DISCHARGE NOTE NURSING/CASE MANAGEMENT/SOCIAL WORK - FINANCIAL ASSISTANCE
[facility] provides services at a reduced cost to those who are determined to be eligible through [facility]’s financial assistance program. Information regarding [facility]’s financial assistance program can be found by going to [email or web address] or by calling [phone].

## 2025-04-24 NOTE — PROGRESS NOTE ADULT - PROBLEM SELECTOR PLAN 3
- 4/19 noted new T wave inversion most prominently in V2 (not seen on 4/18 EKG)   - repeat EKG still showed T2 inversion in V2,    - pt currently asymtomatic and VSS     Plan:  - check troponin - 33 -> 23   - check CKMB - 3 -> 2.6   - cards c/s, appreciate recs- No evidence of ACS, Troponin negative, No further inpatient cardiac workup anticipated at this time

## 2025-04-24 NOTE — PROGRESS NOTE ADULT - PROBLEM SELECTOR PROBLEM 1
Visual hallucination
Uncontrolled type 2 diabetes mellitus with hyperglycemia, with long-term current use of insulin
Visual hallucination
Visual hallucination
Uncontrolled type 2 diabetes mellitus with hyperglycemia, with long-term current use of insulin
Uncontrolled type 2 diabetes mellitus with hyperglycemia, with long-term current use of insulin
Visual hallucination

## 2025-04-24 NOTE — PROGRESS NOTE ADULT - PROBLEM SELECTOR PLAN 12
- severe AS s/p TAVR (9/10/2023), Type A dissection s/p bioAVR with Bental procedure (9/2022)    Plan:   - CTM   - hold plavix i.s.o fall 4/20, resume once cleared by plastic and ophthalmology and ongoing risk and benefits discussion with family regarding whether to resume AC - severe AS s/p TAVR (9/10/2023), Type A dissection s/p bioAVR with Bental procedure (9/2022)    Plan:   - CTM   - hold plavix i.s.o fall 4/20, resume once cleared by plastic and ophthalmology and ongoing risk and benefits discussion with family regarding whether to resume AC - daughter would like to hold eliquis and plavix, will f/u outpt to see if and when is appropraite for resuming them

## 2025-04-24 NOTE — PROGRESS NOTE ADULT - PROBLEM SELECTOR PLAN 1
-Test BG ac and hs. Q6H while NPO  - Glargine to 14 units at bedtime  - Admelog 4 units ac meals. HOLD IF NOT EATING!!  - C/w low correctional scales TID with meals and QHS  - Needs RD consult. Noted poor PO intake in last 2 days requiring low meal time insulin doses. On Megace  - Per primary team, discharging pt on present steroid dose    Discharge recs:  Pt having trouble recalling insulin she takes at home and states she manages insulin independently but can't verbalize how.   Daughter to administer Lantus insulin 12 units q hs.   No need for meal time insulin at this time. GFR> 60. Consider Januvia 100mg daily OR Tradjenta 5mg daily   Make sure pt has Rx for all DM supplies and insulin/ DM meds.  Continue freestyle chin 3 sensor CGM to monitor BG at home. Pt states she has reader  Patient to follow up with endocrinologist Dr. Panchal- appt scheduled for 6/10 1:15pm. Michelle to contact endo if BG persistently >200s x 2 days or BG <70 for med adjustments.   - Needs Optho/ renal/cardiac follow up

## 2025-04-24 NOTE — DISCHARGE NOTE NURSING/CASE MANAGEMENT/SOCIAL WORK - NSTRANSFERDENTURES_GEN_A_NUR
Please follow up with your primary care doctor in the next 1-2 days  Please return the the emergency department if you have worsening swelling or pain in your finger or develop fevers/chills  Please take bactrim two times daily for 7 days  full/upper/lower

## 2025-04-24 NOTE — PROGRESS NOTE ADULT - PROVIDER SPECIALTY LIST ADULT
Cardiology
Infectious Disease
Pulmonology
Infectious Disease
Ophthalmology
Cardiology
Pulmonology
Neurology
Internal Medicine
Endocrinology
Internal Medicine

## 2025-04-24 NOTE — PROGRESS NOTE ADULT - PROBLEM SELECTOR PLAN 7
- home meds: eliquis 5mg BID     Plan:   - pt was on eliquis 5 mg BID which was held after her fall resulting in hematoma   - c/w labetalol 100 mg TID with hold parameter   - c/w mexiletine 200 mg TID  - per plastic surgery on 4/22, clear to resume AC  - given pt's multiple falls and hematoma, will discuss with pt and HCP regarding benefits and risk of resuming AC - home meds: eliquis 5mg BID     Plan:   - pt was on eliquis 5 mg BID which was held after her fall resulting in hematoma   - c/w labetalol 100 mg TID with hold parameter   - c/w mexiletine 200 mg TID  - per plastic surgery on 4/22, clear to resume AC  - given pt's multiple falls and hematoma, will discuss with pt and HCP regarding benefits and risk of resuming AC - daughter would like to hold eliquis and plavix, will f/u outpt to see if and when is appropraite for resuming them

## 2025-04-24 NOTE — PROGRESS NOTE ADULT - SUBJECTIVE AND OBJECTIVE BOX
HPI:     Patient is a 74y old  Female who presents with a chief complaint of visual hallucination, multiple falls, SOB (24 Apr 2025 09:43)    Endocrinology consulted for diabetes management. She states that she is eating well.  Glucose elevated likely secondary to steroid.   Most recent HbA1C: A1C with Estimated Average Glucose Result: 8.0 % (04-18-25 @ 10:27)    INTERVAL HPI/OVERNIGHT EVENTS:  She reports feeling well.   Currently denies polydipsia, polyuria , visual changes, numbness in feet.      insulin glargine Injectable (LANTUS)   12 Unit(s) SubCutaneous (04-23-25 @ 22:14)   12 Unit(s) SubCutaneous (04-22-25 @ 21:52)    insulin lispro (ADMELOG) corrective regimen sliding scale   2 Unit(s) SubCutaneous (04-24-25 @ 12:22)   2 Unit(s) SubCutaneous (04-24-25 @ 08:50)   4 Unit(s) SubCutaneous (04-23-25 @ 17:43)   1 Unit(s) SubCutaneous (04-23-25 @ 12:06)   1 Unit(s) SubCutaneous (04-23-25 @ 08:31)   1 Unit(s) SubCutaneous (04-22-25 @ 18:12)    insulin lispro (ADMELOG) corrective regimen sliding scale   0 Unit(s) SubCutaneous (04-22-25 @ 21:53)    insulin lispro Injectable (ADMELOG)   2 Unit(s) SubCutaneous (04-24-25 @ 12:22)   2 Unit(s) SubCutaneous (04-23-25 @ 17:43)   2 Unit(s) SubCutaneous (04-23-25 @ 08:39)    megestrol   20 milliGRAM(s) Oral (04-24-25 @ 12:11)   20 milliGRAM(s) Oral (04-23-25 @ 12:14)    methylPREDNISolone   8 milliGRAM(s) Oral (04-24-25 @ 06:12)   8 milliGRAM(s) Oral (04-23-25 @ 05:26)    rosuvastatin   5 milliGRAM(s) Oral (04-23-25 @ 22:13)   5 milliGRAM(s) Oral (04-22-25 @ 21:56)      Review of systems:   CONSTITUTIONAL:  Feels well, good appetite  CARDIOVASCULAR:  Negative for chest pain or palpitations  RESPIRATORY:  Negative for cough, or SOB   GASTROINTESTINAL:  Negative for nausea, vomiting, or abdominal pain  GENITOURINARY:  Negative frequency, urgency or dysuria     CAPILLARY BLOOD GLUCOSE      POCT Blood Glucose.: 229 mg/dL (24 Apr 2025 11:24)  POCT Blood Glucose.: 210 mg/dL (24 Apr 2025 08:38)  POCT Blood Glucose.: 157 mg/dL (23 Apr 2025 21:37)  POCT Blood Glucose.: 330 mg/dL (23 Apr 2025 17:24)       MEDICATIONS  (STANDING):  albuterol/ipratropium for Nebulization 3 milliLiter(s) Nebulizer every 6 hours  artificial tears (preservative free) Ophthalmic Solution 1 Drop(s) Both EYES every 4 hours  ascorbic acid 500 milliGRAM(s) Oral daily  buDESOnide    Inhalation Suspension 1 milliGRAM(s) Inhalation every 12 hours  chlorhexidine 2% Cloths 1 Application(s) Topical daily  dextrose 5%. 1000 milliLiter(s) (50 mL/Hr) IV Continuous <Continuous>  dextrose 5%. 1000 milliLiter(s) (100 mL/Hr) IV Continuous <Continuous>  dextrose 50% Injectable 25 Gram(s) IV Push once  dextrose 50% Injectable 12.5 Gram(s) IV Push once  dextrose 50% Injectable 25 Gram(s) IV Push once  ferrous    sulfate 325 milliGRAM(s) Oral daily  fluticasone propionate 50 MICROgram(s)/spray Nasal Spray 1 Spray(s) Both Nostrils two times a day  fluticasone propionate/ salmeterol 250-50 MICROgram(s) Diskus 1 Dose(s) Inhalation two times a day  formoterol for nebulization 20 MICROGram(s) Nebulizer two times a day  glucagon  Injectable 1 milliGRAM(s) IntraMuscular once  glycopyrrolate 1 milliGRAM(s) Oral three times a day  insulin glargine Injectable (LANTUS) 12 Unit(s) SubCutaneous at bedtime  insulin lispro (ADMELOG) corrective regimen sliding scale   SubCutaneous three times a day before meals  insulin lispro (ADMELOG) corrective regimen sliding scale   SubCutaneous at bedtime  insulin lispro Injectable (ADMELOG) 2 Unit(s) SubCutaneous three times a day with meals  labetalol 100 milliGRAM(s) Oral every 8 hours  lacosamide 100 milliGRAM(s) Oral two times a day  levETIRAcetam 1000 milliGRAM(s) Oral two times a day  megestrol 20 milliGRAM(s) Oral daily  melatonin 3 milliGRAM(s) Oral at bedtime  methylPREDNISolone 8 milliGRAM(s) Oral daily  mexiletine 200 milliGRAM(s) Oral three times a day  mineral oil 30 milliLiter(s) Oral daily  montelukast 10 milliGRAM(s) Oral at bedtime  multivitamin 1 Tablet(s) Oral daily  mupirocin 2% Nasal 1 Application(s) Both Nostrils two times a day  NIFEdipine XL 60 milliGRAM(s) Oral daily  Ohtuvayre [Ensifentrine] 3mG/2.5mL Nebul 3 milliGRAM(s) 3 milliGRAM(s) Nebulizer two times a day  pantoprazole    Tablet 40 milliGRAM(s) Oral before breakfast  polyethylene glycol 3350 17 Gram(s) Oral daily  QUEtiapine 12.5 milliGRAM(s) Oral at bedtime  rosuvastatin 5 milliGRAM(s) Oral at bedtime  senna 2 Tablet(s) Oral at bedtime  sertraline 50 milliGRAM(s) Oral daily  sodium chloride 7% Inhalation 4 milliLiter(s) Inhalation every 12 hours  tiotropium 2.5 MICROgram(s) Inhaler 2 Puff(s) Inhalation daily    MEDICATIONS  (PRN):  dextrose Oral Gel 15 Gram(s) Oral once PRN Blood Glucose LESS THAN 70 milliGRAM(s)/deciliter  dextrose Oral Gel 15 Gram(s) Oral once PRN Blood Glucose LESS THAN 70 milliGRAM(s)/deciliter  magnesium citrate Oral Solution 296 milliLiter(s) Oral every 24 hours PRN Constipation      PHYSICAL EXAM  Vital Signs Last 24 Hrs  T(C): 36.9 (24 Apr 2025 11:58), Max: 37 (23 Apr 2025 20:13)  T(F): 98.5 (24 Apr 2025 11:58), Max: 98.6 (23 Apr 2025 20:13)  HR: 62 (24 Apr 2025 14:43) (62 - 82)  BP: 115/66 (24 Apr 2025 14:43) (96/61 - 116/66)  BP(mean): --  RR: 18 (24 Apr 2025 11:58) (18 - 18)  SpO2: 98% (24 Apr 2025 12:05) (93% - 98%)    Parameters below as of 24 Apr 2025 12:05  Patient On (Oxygen Delivery Method): room air        GENERAL: laying in bed in NAD  RESPIRATORY: nonlabored breathing, no accessory muscle use  Extremities: Warm, no edema in all 4 exts   NEURO: A&O X3    LABS:                        11.7   8.43  )-----------( 250      ( 24 Apr 2025 07:14 )             38.3     04-24    142  |  105  |  11  ----------------------------<  177[H]  3.9   |  24  |  0.65    Ca    8.9      24 Apr 2025 07:07  Phos  2.8     04-24  Mg     2.2     04-24    TPro  6.1  /  Alb  3.5  /  TBili  0.3  /  DBili  x   /  AST  16  /  ALT  25  /  AlkPhos  74  04-23    PT/INR - ( 24 Apr 2025 07:14 )   PT: 12.8 sec;   INR: 1.12 ratio         PTT - ( 24 Apr 2025 07:14 )  PTT:29.6 sec  Urinalysis Basic - ( 24 Apr 2025 07:07 )    Color: x / Appearance: x / SG: x / pH: x  Gluc: 177 mg/dL / Ketone: x  / Bili: x / Urobili: x   Blood: x / Protein: x / Nitrite: x   Leuk Esterase: x / RBC: x / WBC x   Sq Epi: x / Non Sq Epi: x / Bacteria: x      Thyroid Stimulating Hormone, Serum: 0.62 uIU/mL (04-18 @ 10:27)  Thyroid Stimulating Hormone, Serum: 2.65 uIU/mL (11-07 @ 06:56)  Thyroid Stimulating Hormone, Serum: 1.84 uIU/mL (10-10 @ 09:19)  Thyroid Stimulating Hormone, Serum: 0.30 uIU/mL (07-03 @ 14:53)

## 2025-04-24 NOTE — PROGRESS NOTE ADULT - SUBJECTIVE AND OBJECTIVE BOX
infectious diseases progress note:    Patient is a 74y old  Female who presents with a chief complaint of visual hallucination, multiple falls, SOB (24 Apr 2025 07:27)        Asthma with acute exacerbation               Allergies    aspirin (Unknown)  codeine (Unknown)  codeine (Short breath)  Valium (Unknown)  cefepime (Short breath (Severe))  shellfish (Anaphylaxis)  penicillin (Anaphylaxis)  cefepime (Anaphylaxis)  penicillin (Unknown)  Percocet (Unknown)  Avelox (Short breath; Pruritus)  Dilaudid (Short breath)  ampicillin (Unknown)  Valium (Short breath)  OxyContin (Unknown)  aspirin (Short breath)  iodine (Short breath; Swelling)  tetanus toxoid (Short breath)  Avelox (Unknown)  meropenem (Unknown)    Intolerances        ANTIBIOTICS/RELEVANT:  antimicrobials    immunologic:    OTHER:  albuterol/ipratropium for Nebulization 3 milliLiter(s) Nebulizer every 6 hours  ascorbic acid 500 milliGRAM(s) Oral daily  buDESOnide    Inhalation Suspension 1 milliGRAM(s) Inhalation every 12 hours  chlorhexidine 2% Cloths 1 Application(s) Topical daily  dextrose 5%. 1000 milliLiter(s) IV Continuous <Continuous>  dextrose 5%. 1000 milliLiter(s) IV Continuous <Continuous>  dextrose 50% Injectable 25 Gram(s) IV Push once  dextrose 50% Injectable 12.5 Gram(s) IV Push once  dextrose 50% Injectable 25 Gram(s) IV Push once  dextrose Oral Gel 15 Gram(s) Oral once PRN  dextrose Oral Gel 15 Gram(s) Oral once PRN  ferrous    sulfate 325 milliGRAM(s) Oral daily  fluticasone propionate 50 MICROgram(s)/spray Nasal Spray 1 Spray(s) Both Nostrils two times a day  fluticasone propionate/ salmeterol 250-50 MICROgram(s) Diskus 1 Dose(s) Inhalation two times a day  formoterol for nebulization 20 MICROGram(s) Nebulizer two times a day  glucagon  Injectable 1 milliGRAM(s) IntraMuscular once  glycopyrrolate 1 milliGRAM(s) Oral three times a day  insulin glargine Injectable (LANTUS) 12 Unit(s) SubCutaneous at bedtime  insulin lispro (ADMELOG) corrective regimen sliding scale   SubCutaneous three times a day before meals  insulin lispro (ADMELOG) corrective regimen sliding scale   SubCutaneous at bedtime  insulin lispro Injectable (ADMELOG) 2 Unit(s) SubCutaneous three times a day with meals  labetalol 100 milliGRAM(s) Oral every 8 hours  lacosamide 100 milliGRAM(s) Oral two times a day  levETIRAcetam 1000 milliGRAM(s) Oral two times a day  magnesium citrate Oral Solution 296 milliLiter(s) Oral every 24 hours PRN  megestrol 20 milliGRAM(s) Oral daily  melatonin 3 milliGRAM(s) Oral at bedtime  methylPREDNISolone 8 milliGRAM(s) Oral daily  mexiletine 200 milliGRAM(s) Oral three times a day  mineral oil 30 milliLiter(s) Oral daily  montelukast 10 milliGRAM(s) Oral at bedtime  multivitamin 1 Tablet(s) Oral daily  mupirocin 2% Nasal 1 Application(s) Both Nostrils two times a day  NIFEdipine XL 60 milliGRAM(s) Oral daily  Ohtuvayre [Ensifentrine] 3mG/2.5mL Nebul 3 milliGRAM(s) 3 milliGRAM(s) Nebulizer two times a day  pantoprazole    Tablet 40 milliGRAM(s) Oral before breakfast  polyethylene glycol 3350 17 Gram(s) Oral daily  QUEtiapine 12.5 milliGRAM(s) Oral at bedtime  rosuvastatin 5 milliGRAM(s) Oral at bedtime  senna 2 Tablet(s) Oral at bedtime  sertraline 50 milliGRAM(s) Oral daily PRN  sodium chloride 7% Inhalation 4 milliLiter(s) Inhalation every 12 hours  tiotropium 2.5 MICROgram(s) Inhaler 2 Puff(s) Inhalation daily      Objective:  Vital Signs Last 24 Hrs  T(C): 36.9 (24 Apr 2025 06:01), Max: 37.4 (23 Apr 2025 11:25)  T(F): 98.4 (24 Apr 2025 06:01), Max: 99.3 (23 Apr 2025 11:25)  HR: 65 (24 Apr 2025 06:01) (54 - 82)  BP: 109/61 (24 Apr 2025 06:01) (97/56 - 116/66)  BP(mean): --  RR: 18 (24 Apr 2025 06:01) (18 - 18)  SpO2: 96% (24 Apr 2025 06:01) (93% - 97%)    Parameters below as of 24 Apr 2025 06:01  Patient On (Oxygen Delivery Method): room air        PHYSICAL EXAM:  Constitutional:Well-developed, well nourished--no acute distress  Eyes:EBER, EOMI  Ear/Nose/Throat: no oral lesion, no sinus tenderness on percussion	  Neck:no JVD, no lymphadenopathy, supple  Respiratory: CTA barry  Cardiovascular: S1S2 RRR, no murmurs  Gastrointestinal:soft, (+) BS, no HSM  Extremities:no e/e/c        LABS:                        11.7   8.43  )-----------( 250      ( 24 Apr 2025 07:14 )             38.3     04-24    142  |  105  |  11  ----------------------------<  177[H]  3.9   |  24  |  0.65    Ca    8.9      24 Apr 2025 07:07  Phos  2.8     04-24  Mg     2.2     04-24    TPro  6.1  /  Alb  3.5  /  TBili  0.3  /  DBili  x   /  AST  16  /  ALT  25  /  AlkPhos  74  04-23    PT/INR - ( 24 Apr 2025 07:14 )   PT: 12.8 sec;   INR: 1.12 ratio         PTT - ( 24 Apr 2025 07:14 )  PTT:29.6 sec  Urinalysis Basic - ( 24 Apr 2025 07:07 )    Color: x / Appearance: x / SG: x / pH: x  Gluc: 177 mg/dL / Ketone: x  / Bili: x / Urobili: x   Blood: x / Protein: x / Nitrite: x   Leuk Esterase: x / RBC: x / WBC x   Sq Epi: x / Non Sq Epi: x / Bacteria: x          MICROBIOLOGY:    RECENT CULTURES:  04-18 @ 11:25 Sputum Sputum       Moderate polymorphonuclear leukocytes per low power field  Few Squamous epithelial cells per low power field  Rare Yeast like cells per oil power field           Commensal val consistent with body site    04-17 @ 22:55 Blood Blood                No growth at 5 days    04-17 @ 22:45 Blood Blood                No growth at 5 days    04-17 @ 19:50 Urine                <10,000 CFU/mL Normal Urogenital Val          RESPIRATORY CULTURES:              RADIOLOGY & ADDITIONAL STUDIES:        Pager [phone]  After 5 pm/weekends or if no response :[phone]

## 2025-04-24 NOTE — DISCHARGE NOTE NURSING/CASE MANAGEMENT/SOCIAL WORK - NSDCVIVACCINE_GEN_ALL_CORE_FT
COVID-19, mRNA, LNP-S, PF, 100 mcg/ 0.5 mL dose (Moderna); 06-Dec-2021 17:12; Afshan Lew ([facility]); [employer] [id number] (Exp. Date: 22-Dec-2021); IntraMuscular; Deltoid Left.; 0.25 milliLiter(s);

## 2025-04-24 NOTE — DISCHARGE NOTE NURSING/CASE MANAGEMENT/SOCIAL WORK - NSDCFUADDAPPT_GEN_ALL_CORE_FT
APPTS ARE READY TO BE MADE: [X] YES    Best Family or Patient Contact (if needed):    Additional Information about above appointments (if needed):    1: follow up with PCP in 2 weeks   2: follow up with endocrinology in 1 month   3: follow up with plastic surgery in 1 week   4: follow up with opthalmology in 1 week   5: follow up with cardiology in 2 weeks    Other comments or requests:

## 2025-04-24 NOTE — PROGRESS NOTE ADULT - PROBLEM SELECTOR PLAN 5
- pt presented with multiple falls in the past month   - Had an inpatient fall 4/20 with face strike and bloody nose and bruised zygomatic process  - CT pelvis with background of bilateral avascular necrosis of the hips    Plan:   - f/u infectious work up as mentioned above - neg currently   - PT/OT/OOB to chair   - nutrition consult  - check orthostatic VS - negative  - CT Head and maxillofacial - Left periorbital hematoma with a comminuted fracture of the left medial orbital wall and resultant hemorrhagic air-fluid levels in the left ethmoid sinus, extending into the left sphenoid sinus.   - x-ray of wrists b/l showed no acute fracture. No dislocation. Joint spaces are maintained. Vascular calcification is present.  - CT Pelvis showed Degenerative changes of both hips. There is avascular necrosis of both femoral heads without articular surface collapse at this time.  *** Will need follow up with orthopedics as an outpatient, can try with Dr Benjamin  - 1:1   - plastic surgery and opthalmology c/s, appreciate recs  - 4/22 noted to have blood tinged sputum, discussed with plastics and opthalmology, per plastic, this can be related to the medial orbital wall fracture, however no acute intervention is needed, CTM and ok to resume AC  - 4/23 given pt's multiple falls, ongoing discussion with daughter regarding whether to resume AC - pt presented with multiple falls in the past month   - Had an inpatient fall 4/20 with face strike and bloody nose and bruised zygomatic process  - CT pelvis with background of bilateral avascular necrosis of the hips    Plan:   - f/u infectious work up as mentioned above - neg currently   - PT/OT/OOB to chair   - nutrition consult  - check orthostatic VS - negative  - CT Head and maxillofacial - Left periorbital hematoma with a comminuted fracture of the left medial orbital wall and resultant hemorrhagic air-fluid levels in the left ethmoid sinus, extending into the left sphenoid sinus.   - x-ray of wrists b/l showed no acute fracture. No dislocation. Joint spaces are maintained. Vascular calcification is present.  - CT Pelvis showed Degenerative changes of both hips. There is avascular necrosis of both femoral heads without articular surface collapse at this time.  *** Will need follow up with orthopedics as an outpatient, can try with Dr Benjamin  - 1:1   - plastic surgery and opthalmology c/s, appreciate recs  - 4/22 noted to have blood tinged sputum, discussed with plastics and opthalmology, per plastic, this can be related to the medial orbital wall fracture, however no acute intervention is needed, CTM and ok to resume AC  - 4/23 given pt's multiple falls, ongoing discussion with daughter regarding whether to resume AC -> daughter would like to hold eliquis and plavix, will f/u outpt to see if and when is appropraite for resuming them

## 2025-04-24 NOTE — PROGRESS NOTE ADULT - ATTENDING COMMENTS
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 Antonio, Type A dissection s/p bioAVR with Bental procedure (9/2022), severe AS s/p TAVR (9/10/2023), TIA's, DVT, Afib on Eliquis,  Colon cancer S/P resection, colostomy bag, neuropathy, Midline for IV ABX placed 2 weeks ago for chronic "TB like" pneumonia on ertapenmen a/w worsening shortness of breath over the last 7 days. multiple falls, urinary burning, visual hallucination, admitted for further management of asthma/COPD exacerbation, and further eval of new onset visual hallucination. 4/17 Noted new T wave inversion most prominently in V2, troponin/CKMB neg. On 4/20, pt had a fall resulting in L periorbital hematoma with comminuted fracture of the left medial orbital wall.     Now stable. Holding off on starting anticoagulation for now given history of frequent falls. D/c today w f/u. D/c time 45 mins.

## 2025-04-24 NOTE — PROGRESS NOTE ADULT - REASON FOR ADMISSION
visual hallucination, multiple falls, SOB

## 2025-04-24 NOTE — CHART NOTE - NSCHARTNOTEFT_GEN_A_CORE
NUTRITION FOLLOW UP NOTE    PATIENT SEEN FOR: first malnutrition follow up    SOURCE: [x] Patient  [x] Current Medical Record  [x] RN  [] Family/support person at bedside  [] Patient unavailable/inappropriate  [] Other:    CHART REVIEWED/EVENTS NOTED.  [] No changes to nutrition care plan to note  [x] Nutrition Status:  -s/p fall  resulting in orbital fracture  -pending d/c when HHA reinstated    DIET ORDER:   Diet, Consistent Carbohydrate/No Snacks:   DASH/TLC {Sodium & Cholesterol Restricted} (DASH)  Supplement Feeding Modality:  Oral  Glucerna Shake Cans or Servings Per Day:  3       Frequency:  Three Times a day (25)    CURRENT DIET ORDER IS:  [] Appropriate:  [] Inadequate:  [x] Other: see recommendations below    NUTRITION INTAKE/PROVISION:  [x] PO: Pt reports tolerating PO diet but unable to quantify how much she is eating - not interested in participating with RD interview  -Per RN report, Pt consuming 0-25% of meals from - which shows a decline since admission  -noted excess Glucernas at bedside - ordered for 6 instead of the recommended 3 per day; Pt states she likes them  -unable to obtain food preferences  [] Enteral Nutrition:  [] Parenteral Nutrition:    ANTHROPOMETRICS:  Drug Dosing Weight  Height (cm): 154.2 (2025 20:00)  Weight (kg): 65.8 (2025 20:00)  BMI (kg/m2): 27.7 (2025 20:00)  BSA (m2): 1.64 (2025 20:00)  Weights:   Daily Weight in k.8 ()     MEDICATIONS:  MEDICATIONS  (STANDING):  albuterol/ipratropium for Nebulization 3 milliLiter(s) Nebulizer every 6 hours  artificial tears (preservative free) Ophthalmic Solution 1 Drop(s) Both EYES every 4 hours  ascorbic acid 500 milliGRAM(s) Oral daily  buDESOnide    Inhalation Suspension 1 milliGRAM(s) Inhalation every 12 hours  chlorhexidine 2% Cloths 1 Application(s) Topical daily  dextrose 5%. 1000 milliLiter(s) (50 mL/Hr) IV Continuous <Continuous>  dextrose 5%. 1000 milliLiter(s) (100 mL/Hr) IV Continuous <Continuous>  dextrose 50% Injectable 25 Gram(s) IV Push once  dextrose 50% Injectable 12.5 Gram(s) IV Push once  dextrose 50% Injectable 25 Gram(s) IV Push once  ferrous    sulfate 325 milliGRAM(s) Oral daily  fluticasone propionate 50 MICROgram(s)/spray Nasal Spray 1 Spray(s) Both Nostrils two times a day  fluticasone propionate/ salmeterol 250-50 MICROgram(s) Diskus 1 Dose(s) Inhalation two times a day  formoterol for nebulization 20 MICROGram(s) Nebulizer two times a day  glucagon  Injectable 1 milliGRAM(s) IntraMuscular once  glycopyrrolate 1 milliGRAM(s) Oral three times a day  insulin glargine Injectable (LANTUS) 12 Unit(s) SubCutaneous at bedtime  insulin lispro (ADMELOG) corrective regimen sliding scale   SubCutaneous three times a day before meals  insulin lispro (ADMELOG) corrective regimen sliding scale   SubCutaneous at bedtime  insulin lispro Injectable (ADMELOG) 2 Unit(s) SubCutaneous three times a day with meals  labetalol 100 milliGRAM(s) Oral every 8 hours  lacosamide 100 milliGRAM(s) Oral two times a day  lactated ringers Bolus 500 milliLiter(s) IV Bolus once  levETIRAcetam 1000 milliGRAM(s) Oral two times a day  megestrol 20 milliGRAM(s) Oral daily  melatonin 3 milliGRAM(s) Oral at bedtime  methylPREDNISolone 8 milliGRAM(s) Oral daily  mexiletine 200 milliGRAM(s) Oral three times a day  mineral oil 30 milliLiter(s) Oral daily  montelukast 10 milliGRAM(s) Oral at bedtime  multivitamin 1 Tablet(s) Oral daily  mupirocin 2% Nasal 1 Application(s) Both Nostrils two times a day  NIFEdipine XL 60 milliGRAM(s) Oral daily  Ohtuvayre [Ensifentrine] 3mG/2.5mL Nebul 3 milliGRAM(s) 3 milliGRAM(s) Nebulizer two times a day  pantoprazole    Tablet 40 milliGRAM(s) Oral before breakfast  polyethylene glycol 3350 17 Gram(s) Oral daily  QUEtiapine 12.5 milliGRAM(s) Oral at bedtime  rosuvastatin 5 milliGRAM(s) Oral at bedtime  senna 2 Tablet(s) Oral at bedtime  sertraline 50 milliGRAM(s) Oral daily  sodium chloride 7% Inhalation 4 milliLiter(s) Inhalation every 12 hours  tiotropium 2.5 MICROgram(s) Inhaler 2 Puff(s) Inhalation daily    MEDICATIONS  (PRN):  dextrose Oral Gel 15 Gram(s) Oral once PRN Blood Glucose LESS THAN 70 milliGRAM(s)/deciliter  dextrose Oral Gel 15 Gram(s) Oral once PRN Blood Glucose LESS THAN 70 milliGRAM(s)/deciliter  magnesium citrate Oral Solution 296 milliLiter(s) Oral every 24 hours PRN Constipation    NUTRITIONALLY PERTINENT LABS:   Na142 mmol/L Glu 177 mg/dL[H] K+ 3.9 mmol/L Cr  0.65 mg/dL BUN 11 mg/dL  Phos 2.8 mg/dL  Alb 3.5 g/dL  Chol 124 mg/dL LDL --    HDL 54 mg/dL Trig 67 mg/dL ALT 25 U/L AST 16 U/L Alkaline Phosphatase 74 U/L  25 @ 10:27 a1c 8.0    A1C with Estimated Average Glucose Result: 8.0 % (25 @ 10:27)  A1C with Estimated Average Glucose Result: 7.7 % (25 @ 08:41)  A1C with Estimated Average Glucose Result: 7.8 % (25 @ 05:16)    Finger Sticks:  POCT Blood Glucose.: 229 mg/dL ( @ 11:24)  POCT Blood Glucose.: 210 mg/dL ( @ 08:38)  POCT Blood Glucose.: 157 mg/dL ( @ 21:37)  POCT Blood Glucose.: 330 mg/dL ( @ 17:24)    NUTRITIONALLY PERTINENT MEDICATIONS/LABS:  [x] Reviewed  [x] Relevant notes on medications/labs:  -fingersticks elevated, likely some component of steroid-induced hyperglycemia; ordered for Lantus/Lispro/ISS  -Megace: appetite stimulant  -hypophosphatemia noted  -MVI and vit C  -senna/Miralax bowel regimen    EDEMA:  [x] Reviewed  [] Relevant notes:    GI/ I&O:  [x] Reviewed  [x] Relevant notes: No N/V/D/C documented; Pt with colostomy - last BM  per RN flowsheets  [] Other:    SKIN:   [x] No pressure injuries documented, per nursing flowsheet  [] Pressure injury previously noted  [] Change in pressure injury documentation:  [] Other:    ESTIMATED NEEDS:  [x] No change:  [] Updated:  Energy:  1776.6-2105.6 kcal/day (27-32 kcal/kg)  Protein: 72.4-85.5  g/day (1.1-1.3 g/kg)  Fluid:   ml/day or [x] defer to team  Based on: dosing wt 65.8 kg    NUTRITION DIAGNOSIS:  [x] Prior Dx: 1) Severe acute on chronic malnutrition and 2) food and nutrition-related knowledge deficit  [] New Dx:    EDUCATION:  [] Yes:  [x] Not appropriate/warranted    NUTRITION CARE PLAN:  1. Diet: recommend liberalize diet to consistent carbohydrate, low sodium; encouragement/assistance at all meals  2. Supplements: decrease Glucerna Shakes to 3x/day (220 kcal, 10 g Pro/8oz)  3. Multivitamin/mineral supplementation: continue MVI and vit C as warranted  4. Continue Megace appetite stimulant; defer to Endocrine for glycemic management    [] Achieved - Continue current nutrition intervention(s)  [] Current medical condition precludes nutrition intervention at this time.    MONITORING AND EVALUATION:   RD remains available upon request and will follow up per protocol.    Jami Curtis MPH, RD, CDN  Available on [facility] TEAMS

## 2025-04-25 ENCOUNTER — NON-APPOINTMENT (OUTPATIENT)
Age: 75
End: 2025-04-25

## 2025-04-28 ENCOUNTER — APPOINTMENT (OUTPATIENT)
Dept: INTERNAL MEDICINE | Facility: CLINIC | Age: 75
End: 2025-04-28

## 2025-04-28 ENCOUNTER — OUTPATIENT (OUTPATIENT)
Dept: OUTPATIENT SERVICES | Facility: HOSPITAL | Age: 75
LOS: 1 days | End: 2025-04-28

## 2025-04-28 VITALS
BODY MASS INDEX: 22.76 KG/M2 | SYSTOLIC BLOOD PRESSURE: 120 MMHG | DIASTOLIC BLOOD PRESSURE: 60 MMHG | HEIGHT: 67 IN | HEART RATE: 72 BPM | WEIGHT: 145 LBS | OXYGEN SATURATION: 97 %

## 2025-04-28 DIAGNOSIS — R44.2 OTHER HALLUCINATIONS: ICD-10-CM

## 2025-04-28 DIAGNOSIS — Z93.3 COLOSTOMY STATUS: Chronic | ICD-10-CM

## 2025-04-28 DIAGNOSIS — Z95.2 PRESENCE OF PROSTHETIC HEART VALVE: Chronic | ICD-10-CM

## 2025-04-28 DIAGNOSIS — Z98.89 OTHER SPECIFIED POSTPROCEDURAL STATES: Chronic | ICD-10-CM

## 2025-04-28 DIAGNOSIS — Z87.09 PERSONAL HISTORY OF OTHER DISEASES OF THE RESPIRATORY SYSTEM: Chronic | ICD-10-CM

## 2025-04-28 DIAGNOSIS — K62.5 HEMORRHAGE OF ANUS AND RECTUM: Chronic | ICD-10-CM

## 2025-04-28 DIAGNOSIS — Z96.653 PRESENCE OF ARTIFICIAL KNEE JOINT, BILATERAL: Chronic | ICD-10-CM

## 2025-04-28 DIAGNOSIS — I10 ESSENTIAL (PRIMARY) HYPERTENSION: ICD-10-CM

## 2025-04-28 PROCEDURE — G0463: CPT

## 2025-04-28 PROCEDURE — G2211 COMPLEX E/M VISIT ADD ON: CPT

## 2025-04-28 PROCEDURE — 99214 OFFICE O/P EST MOD 30 MIN: CPT

## 2025-04-28 RX ORDER — QUETIAPINE FUMARATE 25 MG/1
25 TABLET ORAL
Qty: 45 | Refills: 3 | Status: ACTIVE | COMMUNITY
Start: 2025-04-28 | End: 1900-01-01

## 2025-04-28 RX ORDER — SITAGLIPTIN 100 MG/1
100 TABLET, FILM COATED ORAL
Qty: 90 | Refills: 3 | Status: ACTIVE | COMMUNITY
Start: 2025-04-28 | End: 1900-01-01

## 2025-04-29 NOTE — PROGRESS NOTE ADULT - ASSESSMENT
Patient is a 73 yo F w/ asthma (chronic steroids), bronchiectasis, allergic rhinitis, recurrent PNA, PND, tracheomalacia s/p tracheoplasty, tracheobronchomalacia, pAfib (Eliquis), colorectal ca s/p colostomy, aortic dissection s/p ascending aorta repair who p/w acute on chronic shortness of breath in the setting of recent completion of abx and tapering of her steroids as an outpatient.     #SOB - Likely 2/2 bronchiectasis flare and asthma exacerbation  #Bronchiectasis flare  #Acute Asthma exacerbation  - ID recs appreciated, s/p Ertapenem, currently on 7 day course of Linezolid  - c/w albuterol/7% with airway clearance q6h  - Please add Aerobika q6h  - c/w Prednisone 30mg PO daily, anticipate extended taper decreasing by 10mg per week until back at maintenance dose  - c/w Budesonide 0.5mg q12h   - c/w Bactrim for PJP ppx  - PT/OT, OOB to chair as much as possible    Shaan Shah MD  Pulmonary & Critical Care   independent

## 2025-04-30 ENCOUNTER — NON-APPOINTMENT (OUTPATIENT)
Age: 75
End: 2025-04-30

## 2025-04-30 ENCOUNTER — APPOINTMENT (OUTPATIENT)
Dept: CARDIOLOGY | Facility: CLINIC | Age: 75
End: 2025-04-30
Payer: MEDICARE

## 2025-04-30 VITALS
SYSTOLIC BLOOD PRESSURE: 106 MMHG | TEMPERATURE: 98.3 F | HEIGHT: 67 IN | RESPIRATION RATE: 16 BRPM | HEART RATE: 48 BPM | BODY MASS INDEX: 22.29 KG/M2 | WEIGHT: 142 LBS | DIASTOLIC BLOOD PRESSURE: 50 MMHG | OXYGEN SATURATION: 99 %

## 2025-04-30 DIAGNOSIS — Z79.01 ENCOUNTER FOR THERAPEUTIC DRUG LVL MONITORING: ICD-10-CM

## 2025-04-30 DIAGNOSIS — I49.3 VENTRICULAR PREMATURE DEPOLARIZATION: ICD-10-CM

## 2025-04-30 DIAGNOSIS — I48.91 UNSPECIFIED ATRIAL FIBRILLATION: ICD-10-CM

## 2025-04-30 DIAGNOSIS — I48.0 PAROXYSMAL ATRIAL FIBRILLATION: ICD-10-CM

## 2025-04-30 DIAGNOSIS — I44.0 ATRIOVENTRICULAR BLOCK, FIRST DEGREE: ICD-10-CM

## 2025-04-30 DIAGNOSIS — Z01.810 ENCOUNTER FOR PREPROCEDURAL CARDIOVASCULAR EXAMINATION: ICD-10-CM

## 2025-04-30 DIAGNOSIS — R00.2 PALPITATIONS: ICD-10-CM

## 2025-04-30 DIAGNOSIS — I71.9 AORTIC ANEURYSM OF UNSPECIFIED SITE, W/OUT RUPTURE: ICD-10-CM

## 2025-04-30 DIAGNOSIS — I10 ESSENTIAL (PRIMARY) HYPERTENSION: ICD-10-CM

## 2025-04-30 DIAGNOSIS — Z51.81 ENCOUNTER FOR THERAPEUTIC DRUG LVL MONITORING: ICD-10-CM

## 2025-04-30 DIAGNOSIS — I35.1 NONRHEUMATIC AORTIC (VALVE) INSUFFICIENCY: ICD-10-CM

## 2025-04-30 DIAGNOSIS — I45.10 UNSPECIFIED RIGHT BUNDLE-BRANCH BLOCK: ICD-10-CM

## 2025-04-30 PROCEDURE — 93000 ELECTROCARDIOGRAM COMPLETE: CPT

## 2025-04-30 PROCEDURE — 99214 OFFICE O/P EST MOD 30 MIN: CPT

## 2025-04-30 PROCEDURE — G2211 COMPLEX E/M VISIT ADD ON: CPT

## 2025-04-30 NOTE — PATIENT PROFILE ADULT. - NS PRO TALK SOMEONE YN
[facility]  Cardiac Electrophysiology  [address]  [place] [address]   Office: [phone]  Fax: [phone]     HEART CARE ELECTROPHYSIOLOGY FOLLOW UP    Primary Care: Jayant Dalton MD    Assessment/Recommendations     SVT: Status post AVNRT ablation via slow pathway with Dr. Mercado. No symptomatology suggesting recurrent arrhythmia. He has had no complications subsequent to catheter ablation procedure. Had previously been managed on metoprolol 50 mg BID, this was stopped prior to ablation and has not been resumed.     Hypertension: Controlled, follows with general cardiology    Follow up: general cardiology 6 months       History of Present Illness/Subjective    Vince Martinez is a 58 year old male who is seen today for follow up status post AVNRT ablation with Dr. Mercado 3/18/25. He has a past medical history significant for SVT and HTN. He follows with Dr. Deluna for general cardiology.     Previously reproted SVT epsidoes multiple times per week.  He has had no recurrences since.  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 He monitors with a smart watch consistently.    He reports an uneventful recovery from ablation. He denies groin site issues, heartburn, difficulty swallowing, or neurologic changes. He denies chest discomfort, palpitations, abdominal fullness/bloating or peripheral edema, shortness of breath, paroxysmal nocturnal dyspnea, orthopnea, lightheadedness, dizziness, pre-syncope, or syncope.     Data Review     Mr. Martinez's SVT history is as summarized below:  Symptoms: palpitations  Symptom onset date: ~2018  Diagnosis date: ~2018  Admissions/ER visits: 11/20/2024 (ER - adenosine)  Prior medical therapies: metoprolol ~2018  Prior adenosine/DCCVs: ~2018 (adenosine), 11/20/2024 (adenosine 6mg IV)  Prior ablations: none    ECGs (tracings independently reviewed)  11/20/2024 - SR 92bpm, WV 168ms  11/20/2024 -  regular short RP NCT 147bpm     1/14/2025 TTE  1. Left ventricular chamber size, wall thickness and systolic function are  normal. The visually estimated left ventricular ejection fraction is 60-65%.  2. Right ventricular chamber size and systolic function are normal.  3. No hemodynamically significant valvular abnormalities.  4. Non-aneurysmal enlargement of the aortic root measuring 4.1 cm.  5. No prior study available for comparison.     I have reviewed and updated the patient's past medical history, allergy list and medication list.                Physical Examination Review of Systems   BMI= There is no height or weight on file to calculate BMI.    Wt Readings from Last 3 Encounters:   03/18/25 109.8 kg (242 lb)   02/18/25 111.9 kg (246 lb 12.8 oz)   01/30/25 109.1 kg (240 lb 8 oz)       Vitals: There were no vitals taken for this visit.  General   Appearance:   Alert and oriented, in no acute distress.    HEENT:  Normocephalic and atraumatic. Conjunctiva and sclera are clear. Moist oral mucosa.    Neck: No JVP, carotid bruit or obvious thyromegaly.   Lungs:   Respirations unlabored. Clear bilaterally with no rales, rhonchi, or wheezes.     Cardiovascular:   Rhythm is regular. S1 and S2 are normal. No significant murmur is present. Lower extremities demonstrate no significant edema. Posterior tibial pulses are intact bilaterally.   Extremities: No cyanosis or clubbing   Skin: Skin is warm, dry, and otherwise intact.   Neurologic: Gait not assessed. Mood and affect appropriate.     ROS: 10 point ROS neg other than the symptoms noted above in the HPI.        Medical History  Surgical History Family History Social History   No past medical history on file. Past Surgical History:   Procedure Laterality Date    EP ABLATION SVT N/A 3/18/2025    Procedure: Ablation Supraventricular Tachycardia;  Surgeon: Stephanie Mercado MD;  Location: [facility] LAB CV    No family history on file. Social History      Socioeconomic History    Marital status:      Spouse name: Not on file    Number of children: Not on file    Years of education: Not on file    Highest education level: Not on file   Occupational History    Not on file   Tobacco Use    Smoking status: Never     Passive exposure: Never    Smokeless tobacco: Never   Vaping Use    Vaping status: Never Used   Substance and Sexual Activity    Alcohol use: Yes    Drug use: Never    Sexual activity: Not on file   Other Topics Concern    Not on file   Social History Narrative    Not on file     Social Drivers of Health     Financial Resource Strain: Low Risk  (5/8/2024)    Financial Resource Strain     Within the past 12 months, have you or your family members you live with been unable to get utilities (heat, electricity) when it was really needed?: No   Food Insecurity: Low Risk  (5/8/2024)    Food Insecurity     Within the past 12 months, did you worry that your food would run out before you got money to buy more?: No     Within the past 12 months, did the food you bought just not last and you didn t have money to get more?: No   Transportation Needs: Low Risk  (5/8/2024)    Transportation Needs     Within the past 12 months, has lack of transportation kept you from medical appointments, getting your medicines, non-medical meetings or appointments, work, or from getting things that you need?: No   Physical Activity: Unknown (5/8/2024)    Exercise Vital Sign     Days of Exercise per Week: 0 days     Minutes of Exercise per Session: Not on file   Stress: No Stress Concern Present (5/8/2024)    [employer] - Occupational Stress Questionnaire     Feeling of Stress : Only a little   Social Connections: Unknown (5/8/2024)    Social Connection and Isolation Panel [NHANES]     Frequency of Communication with Friends and Family: Not on file     Frequency of Social Gatherings with Friends and Family: Once a week     Attends Amish  Services: Not on file     Active Member of Clubs or Organizations: Not on file     Attends Club or Organization Meetings: Not on file     Marital Status: Not on file   Interpersonal Safety: Low Risk  (3/18/2025)    Interpersonal Safety     Do you feel physically and emotionally safe where you currently live?: Yes     Within the past 12 months, have you been hit, slapped, kicked or otherwise physically hurt by someone?: No     Within the past 12 months, have you been humiliated or emotionally abused in other ways by your partner or ex-partner?: No   Housing Stability: Low Risk  (5/8/2024)    Housing Stability     Do you have housing? : Yes     Are you worried about losing your housing?: No          Medications  Allergies   Scheduled Meds:  Current Outpatient Medications   Medication Sig Dispense Refill    atorvastatin (LIPITOR) 40 MG tablet Take 1 tablet (40 mg) by mouth daily. 90 tablet 3    glipiZIDE (GLUCOTROL XL) 10 MG 24 hr tablet Take 2 tablets (20 mg) by mouth daily. 180 tablet 1    JARDIANCE 10 MG TABS tablet TAKE 1 TABLET (10 MG) BY MOUTH DAILY. 90 tablet 1    Lancets (ONETOUCH DELICA PLUS [id number]) MISC PLEASE SEE ATTACHED FOR DETAILED DIRECTIONS      levothyroxine (SYNTHROID/LEVOTHROID) 200 MCG tablet TAKE 1 TABLET BY MOUTH EVERY DAY 30 tablet 0    metFORMIN (GLUCOPHAGE XR) 500 MG 24 hr tablet TAKE 4 TABLETS (2,000 MG) BY MOUTH DAILY (WITH BREAKFAST) 360 tablet 0    ONETOUCH VERIO IQ test strip 1 strip by In Vitro route 3 times daily      SUMAtriptan (IMITREX) 50 MG tablet Take 1 tablet (50 mg) by mouth at onset of headache for migraine 9 tablet 11    traZODone (DESYREL) 50 MG tablet Take 1 tablet (50 mg) by mouth at bedtime. 90 tablet 1    No Known Allergies      Lab Results    Chemistry/lipid CBC Cardiac Enzymes/BNP/TSH/INR   Lab Results   Component Value Date    CHOL 312 (H) 05/08/2024    HDL 55 05/08/2024    TRIG 478 (H) 05/08/2024    BUN 16.2 03/18/2025     03/18/2025    CO2 30 (H) 03/18/2025     Lab Results   Component Value Date    WBC 7.9 2025    HGB 15.8 2025    HCT 44.8 2025    MCV 88 2025     2025    Last Comprehensive Metabolic Panel:  Lab Results   Component Value Date     2025    POTASSIUM 3.9 2025    CHLORIDE 105 2025    CO2 30 (H) 2025    ANIONGAP 4 (L) 2025     (H) 2025    BUN 16.2 2025    CR 0.93 2025    GFRESTIMATED >90 2025    WILLAM 9.0 2025             This note has been dictated using voice recognition software. Any grammatical, typographical, or context distortions are unintentional and inherent to the software.    Kurtis Miles PA-C  Clinical Cardiac Electrophysiology  [facility]  Clinic and [phone]  Fax: [phone]  Electrophysiology Nurses: [phone]      no

## 2025-05-02 ENCOUNTER — APPOINTMENT (OUTPATIENT)
Dept: PULMONOLOGY | Facility: CLINIC | Age: 75
End: 2025-05-02
Payer: MEDICARE

## 2025-05-02 VITALS
SYSTOLIC BLOOD PRESSURE: 113 MMHG | BODY MASS INDEX: 22.29 KG/M2 | TEMPERATURE: 97.6 F | HEART RATE: 49 BPM | WEIGHT: 142 LBS | RESPIRATION RATE: 17 BRPM | HEIGHT: 67 IN | DIASTOLIC BLOOD PRESSURE: 67 MMHG | OXYGEN SATURATION: 96 %

## 2025-05-02 PROCEDURE — 96372 THER/PROPH/DIAG INJ SC/IM: CPT

## 2025-05-02 RX ORDER — TEZEPELUMAB-EKKO 210 MG/1.9ML
210 INJECTION, SOLUTION SUBCUTANEOUS
Qty: 0 | Refills: 0 | Status: COMPLETED | OUTPATIENT
Start: 2025-05-02

## 2025-05-02 NOTE — H&P ADULT - PROBLEM SELECTOR PLAN 3
New Patient Evaluation  [facility]  Martinez Aguayo MD [facility]  Orthopaedic Surgery : Adult Hip and Knee Reconstruction  [address], [address], [place] [address], [phone]                Patient Name:  Hazel Fisher  MRN:    [id number]  Date of Birth / AGE:  1949 / 75 year old  Encounter:    5/5/2025    Encounter Provider:  Martinez Aguayo MD  PCP:    Chandana Gaspar MD    No chief complaint on file.      Accompanied by :  ***      Utilized : {Yes_No:892888}    Patient was seen as a consultation for Chandana Gaspar MD     This note has been cc'd.    History of Present Illness :       Hazel Fisher is a 75 year old female here today for an evaluation of the {orthobodypart:581262}.  Patient ambulates {Bjzambulation:290313}. Patient reports that the problem  began {bjzhowlonghavehadthispain:247204} from  {HPI_CONTEXT_INJURY_ORTHO:966632}. Describes pain as {Pain description BJZ:348835} that is localized to {Pain Location BJZ:089460}. Rates pain {PAIN SCALE 1-10:579733}/10 and is {CONSTANT:152746}. Pain is worse with {Ortho aggravating BJZ:313372}. For pain control {he/she:551670} does uses {BJZMedications:972390}. {DENIES OR REPORTS:462840} neurological symptoms. In the past {INJECTIONORTHO:269399}. {PT BJZ:853497}. Patient reports that this does impact activities of daily living and/or demands of employment.             Previous Joint Surgery: ***    Patient here today to discuss ***.      Past Medical History:     Past Medical History:   Diagnosis Date    Acetaminophen toxicity 2017    Acute liver failure without hepatic coma (CMD) 2017    Anxiety     Attention deficit disorder     Bipolar 1 disorder  (CMD)     Cataract     Chronic pain     Depression     DJD (degenerative joint disease)     Drug-induced hepatitis     Essential (primary) hypertension     Fatigue     GERD (gastroesophageal reflux disease)     Glaucoma     Hypercalcemia      Hyperparathyroid bone disease  (CMD)     Iron deficiency anemia 06/23/2022    Nocturia     Ostealgia     Osteoporosis     Sacroiliitis (CMD)     Scoliosis     Thyroid condition     Urinary incontinence        Past Surgical History:     Past Surgical History:   Procedure Laterality Date    Back surgery  2004    Spinal fusion    Bariatric laparoscopic bypass self pay  12/07/2005    Cholecystectomy      Esophageal dilation      Extracapsular cataract removal w insert io lens prosth wo ecp Bilateral     Gallbladder surgery  2007    Hysterectomy  1999    Tonsillectomy  1951    Total knee replacement Left 05/09/2018       Social History:     Social History     Socioeconomic History    Marital status:      Spouse name: Not on file    Number of children: Not on file    Years of education: Not on file    Highest education level: Not on file   Occupational History    Not on file   Tobacco Use    Smoking status: Never    Smokeless tobacco: Never   Vaping Use    Vaping status: never used   Substance and Sexual Activity    Alcohol use: Not Currently     Alcohol/week: 2.0 standard drinks of alcohol     Types: 2 Glasses of wine per week     Comment: SOCIALLY 2 DRINKS IN A MONTH LAST DRINKS WAS 6 WEEKS AGO    Drug use: Not Currently     Types: Marijuana     Comment: every other week/ LAST ONE WAS COUPLE OF  YEAR AGO    Sexual activity: Not on file   Other Topics Concern    Not on file   Social History Narrative    ** Merged History Encounter **          Social Drivers of Health     Financial Resource Strain: Low Risk  (12/3/2024)    Financial Resource Strain     Unable to Get: None   Food Insecurity: Low Risk  (12/3/2024)    Food Insecurity     Worried about Food: Never true     Food is Gone: Never true   Transportation Needs: No Transportation Needs (2/19/2025)    OASIS : Transportation     Lack of Transportation (Medical): No     Lack of Transportation (Non-Medical): No     Patient Unable or Declines to Respond:  No   Recent Concern: Transportation Needs - Unmet Transportation Needs (12/26/2024)    OASIS : Transportation     Lack of Transportation (Medical): No     Lack of Transportation (Non-Medical): Yes     Patient Unable or Declines to Respond: No   Physical Activity: High Risk (1/24/2025)    Exercise Vital Sign     Days of Exercise per Week: 0 days     Minutes of Exercise per Session: 0 min   Stress: Low Risk  (9/5/2024)    Stress     How Stressed: Not at all   Social Connections: Feeling Somewhat Isolated (2/19/2025)    OASIS : Social Isolation     Frequency of experiencing loneliness or isolation: Sometimes   Feeling safe in your relationship: Low Risk  (12/3/2024)    Interpersonal Safety     How often physically hurt: Never     How often insulted or talked down to: Never     How often threatened with harm: Never     How often scream or curse at: Never       Medications:     Current Outpatient Medications   Medication Sig Dispense Refill    QUEtiapine (SEROquel) 300 MG tablet TAKE 1 TABLET BY MOUTH EVERY NIGHT FOR SLEEP 30 tablet 0    vortioxetine (TRINTELLIX) 10 MG tablet Take 1 tablet by mouth daily. Take with 20 mg for total of 30 mg daily 30 tablet 0    Vortioxetine HBr 20 MG Tab Take 1 tablet by mouth daily. Indications: Major Depressive Disorder 30 tablet 0    MAGNESIUM OXIDE PO       ondansetron (ZOFRAN) 4 MG tablet Take 4 mg by mouth every 8 hours as needed for Nausea.      bumetanide (BUMEX) 1 MG tablet Take 1 tablet by mouth 2 times daily. 60 tablet 1    lurasidone (Latuda) 40 MG tablet Take 1 tablet by mouth daily (with breakfast). 90 tablet 0    potassium chloride (KLOR-CON M) 10 MEQ dionisio ER tablet TAKE 1 TABLET BY MOUTH DAILY 90 tablet 1    atorvastatin (LIPITOR) 10 MG tablet Take 0.5 tablets by mouth daily. (Patient not taking: Reported on 4/10/2025) 90 tablet 3    acetaminophen (TYLENOL) 500 MG tablet Take 1,000 mg by mouth as needed for Pain.      atomoxetine (STRATTERA) 100 MG capsule Take  100 mg by mouth daily. (Patient not taking: Reported on 4/10/2025)      midodrine (PROAMATINE) 5 MG tablet Take 5 mg by mouth 2 (two) times a day. Begin taking on January 29, 2025. Take only if SBP <110      gabapentin (NEURONTIN) 100 MG capsule Take 1 capsule by mouth in the morning and 1 capsule at noon and 1 capsule in the evening. Indications: Neuropathic Pain. 90 capsule 11    clobetasol (TEMOVATE) 0.05 % ointment Apply topically 2 times daily as needed.      tamsulosin (FLOMAX) 0.4 MG Cap Take 0.4 mg by mouth daily after a meal.      nystatin (MYCOSTATIN) 524968 UNIT/GM cream Apply 1 Application topically in the morning and 1 Application in the evening.      aspirin 81 MG chewable tablet Chew 1 tablet by mouth daily.      calcitRIOL (ROCALTROL) 0.5 MCG capsule Take 0.5 mcg by mouth in the morning and 0.5 mcg in the evening. Indications: Low Amount of Calcium in the Blood.      fluticasone (FLONASE) 50 MCG/ACT nasal spray Spray 1 spray in each nostril daily. 16 g 12    levothyroxine 175 MCG tablet Take 175 mcg by mouth daily. Indications: Underactive Thyroid Pt is taking 150 mcg daily      pantoprazole (PROTONIX) 40 MG tablet Take 40 mg by mouth daily. Indications: Gastroesophageal Reflux Disease      metoPROLOL tartrate (LOPRESSOR) 25 MG tablet Take 25 mg by mouth in the morning and 25 mg in the evening. Indications: High Blood Pressure Disorder. Begin taking on March 19, 2022.       No current facility-administered medications for this visit.       Allergies:     ALLERGIES:   Allergen Reactions    Duloxetine Hcl ANXIETY and Other (See Comments)     Patient states just had a bad reaction and would like not to take this medication    Valproic Acid Other (See Comments)     Twitching and hair loss    Ziprasidone DIZZINESS and Other (See Comments)     Patient did not give a reaction just stated was allergic  Hair loss, eye twitching  Dizzy, top heavy    Amoxicillin Other (See Comments)    Baclofen Other (See  Comments)     Patient stated that her tongue was sticking out after taking this medication    Lactose   (Food Or Med) DIARRHEA and Other (See Comments)    Lithium ANXIETY         Objective:     There is no height or weight on file to calculate BMI.    {BJZOfficeExam:604705}           RESULTS:    I have personally reviewed the most recent imaging studies.  My personal interpretation is as follows:                  Medical Decision Making:     No diagnosis found.    The patient and I have reviewed the diagnosis, prognosis and treatment options. I independently reviewed recent imaging studies, lab values, and pertinent provider notes. We discussed surgical and non-surgical management. Questions were solicited and answered to the patient's satisfaction. All clinical staff documentation is noted and accepted as an accurate representation of the clinical visit and plan of care.    Following our discussion, we have decided to pursue the following treatment plan:                Plan as follows:    ***  ***    {Risks of Surgery:540718}    Office Procedures:          INatali RN, was present during the encounter date 5/5/2025 and assisted in the documentation of the services performed by Martinez Aguayo MD.          Martinez Aguayo MD  Orthopaedic Surgery   05/02/25              --not dyspneic now, not requiring oxygen. Has increased sputum production.   --c/w solumedrol 20mg, taper down as tolerated.   --appreciate pulmonary recs.   --c/w all pulmonary meds (listed in med list)

## 2025-05-05 ENCOUNTER — APPOINTMENT (OUTPATIENT)
Age: 75
End: 2025-05-05
Payer: MEDICARE

## 2025-05-05 ENCOUNTER — NON-APPOINTMENT (OUTPATIENT)
Age: 75
End: 2025-05-05

## 2025-05-05 PROCEDURE — 92014 COMPRE OPH EXAM EST PT 1/>: CPT

## 2025-05-06 ENCOUNTER — APPOINTMENT (OUTPATIENT)
Dept: OTOLARYNGOLOGY | Facility: CLINIC | Age: 75
End: 2025-05-06

## 2025-05-06 PROCEDURE — 99214 OFFICE O/P EST MOD 30 MIN: CPT | Mod: 2W

## 2025-05-06 NOTE — H&P PST ADULT - CARDIOVASCULAR DETAILS
FOLLOW UP: Please review and clarify if the baby is to continue on enfacare or switch to neosure  and call back Mom, thank you    REASON FOR CONVERSATION: Advice Only    SYMPTOMS: N/A    OTHER: Mom is calling with questions about the new WIC letter. She states that she was originally on Similac Advanced, then switched to enfacare.     The WIC letter dated 04/29/2025 is for Neosure, but the baby has never had Neosure. Mom wants to be sure which formula the baby should be on prior to purchasing, thank you    Call placed to the office, spoke with Leisa who will clarify with the provider. Mom is aware of the same.     DISPOSITION: No disposition on file.     murmur/positive S1/positive S2

## 2025-05-07 ENCOUNTER — EMERGENCY (EMERGENCY)
Facility: HOSPITAL | Age: 75
LOS: 1 days | End: 2025-05-07
Payer: MEDICARE

## 2025-05-07 ENCOUNTER — NON-APPOINTMENT (OUTPATIENT)
Age: 75
End: 2025-05-07

## 2025-05-07 ENCOUNTER — APPOINTMENT (OUTPATIENT)
Dept: ENDOCRINOLOGY | Facility: CLINIC | Age: 75
End: 2025-05-07
Payer: MEDICARE

## 2025-05-07 VITALS
WEIGHT: 142 LBS | HEART RATE: 45 BPM | DIASTOLIC BLOOD PRESSURE: 75 MMHG | OXYGEN SATURATION: 95 % | BODY MASS INDEX: 22.29 KG/M2 | SYSTOLIC BLOOD PRESSURE: 115 MMHG | HEIGHT: 67 IN

## 2025-05-07 VITALS
HEART RATE: 45 BPM | HEIGHT: 60.7 IN | OXYGEN SATURATION: 99 % | SYSTOLIC BLOOD PRESSURE: 104 MMHG | DIASTOLIC BLOOD PRESSURE: 53 MMHG | WEIGHT: 99.21 LBS | RESPIRATION RATE: 19 BRPM

## 2025-05-07 DIAGNOSIS — Z98.89 OTHER SPECIFIED POSTPROCEDURAL STATES: Chronic | ICD-10-CM

## 2025-05-07 DIAGNOSIS — Z98.890 OTHER SPECIFIED POSTPROCEDURAL STATES: Chronic | ICD-10-CM

## 2025-05-07 DIAGNOSIS — Z95.2 PRESENCE OF PROSTHETIC HEART VALVE: Chronic | ICD-10-CM

## 2025-05-07 DIAGNOSIS — H05.352 EXOSTOSIS OF LEFT ORBIT: Chronic | ICD-10-CM

## 2025-05-07 DIAGNOSIS — Z87.09 PERSONAL HISTORY OF OTHER DISEASES OF THE RESPIRATORY SYSTEM: Chronic | ICD-10-CM

## 2025-05-07 DIAGNOSIS — K62.5 HEMORRHAGE OF ANUS AND RECTUM: Chronic | ICD-10-CM

## 2025-05-07 DIAGNOSIS — Z96.653 PRESENCE OF ARTIFICIAL KNEE JOINT, BILATERAL: Chronic | ICD-10-CM

## 2025-05-07 DIAGNOSIS — Z93.3 COLOSTOMY STATUS: Chronic | ICD-10-CM

## 2025-05-07 DIAGNOSIS — R00.1 BRADYCARDIA, UNSPECIFIED: ICD-10-CM

## 2025-05-07 LAB
ALBUMIN SERPL ELPH-MCNC: 3.9 G/DL — SIGNIFICANT CHANGE UP (ref 3.3–5)
ALP SERPL-CCNC: 91 U/L — SIGNIFICANT CHANGE UP (ref 40–120)
ALT FLD-CCNC: 16 U/L — SIGNIFICANT CHANGE UP (ref 10–45)
ANION GAP SERPL CALC-SCNC: 15 MMOL/L — SIGNIFICANT CHANGE UP (ref 5–17)
APPEARANCE UR: CLEAR — SIGNIFICANT CHANGE UP
AST SERPL-CCNC: 17 U/L — SIGNIFICANT CHANGE UP (ref 10–40)
B-OH-BUTYR SERPL-SCNC: 0.1 MMOL/L — SIGNIFICANT CHANGE UP
BASOPHILS # BLD AUTO: 0.02 K/UL — SIGNIFICANT CHANGE UP (ref 0–0.2)
BASOPHILS NFR BLD AUTO: 0.2 % — SIGNIFICANT CHANGE UP (ref 0–2)
BILIRUB SERPL-MCNC: 0.2 MG/DL — SIGNIFICANT CHANGE UP (ref 0.2–1.2)
BILIRUB UR-MCNC: NEGATIVE — SIGNIFICANT CHANGE UP
BUN SERPL-MCNC: 18 MG/DL — SIGNIFICANT CHANGE UP (ref 7–23)
CALCIUM SERPL-MCNC: 9.6 MG/DL — SIGNIFICANT CHANGE UP (ref 8.4–10.5)
CHLORIDE SERPL-SCNC: 104 MMOL/L — SIGNIFICANT CHANGE UP (ref 96–108)
CO2 SERPL-SCNC: 23 MMOL/L — SIGNIFICANT CHANGE UP (ref 22–31)
COLOR SPEC: YELLOW — SIGNIFICANT CHANGE UP
CREAT SERPL-MCNC: 0.65 MG/DL — SIGNIFICANT CHANGE UP (ref 0.5–1.3)
DIFF PNL FLD: NEGATIVE — SIGNIFICANT CHANGE UP
EGFR: 91 ML/MIN/1.73M2 — SIGNIFICANT CHANGE UP
EGFR: 91 ML/MIN/1.73M2 — SIGNIFICANT CHANGE UP
EOSINOPHIL # BLD AUTO: 0.01 K/UL — SIGNIFICANT CHANGE UP (ref 0–0.5)
EOSINOPHIL NFR BLD AUTO: 0.1 % — SIGNIFICANT CHANGE UP (ref 0–6)
FLUAV AG NPH QL: SIGNIFICANT CHANGE UP
FLUBV AG NPH QL: SIGNIFICANT CHANGE UP
GAS PNL BLDV: SIGNIFICANT CHANGE UP
GAS PNL BLDV: SIGNIFICANT CHANGE UP
GLUCOSE BLDC GLUCOMTR-MCNC: 143 MG/DL — HIGH (ref 70–99)
GLUCOSE BLDC GLUCOMTR-MCNC: 145 MG/DL — HIGH (ref 70–99)
GLUCOSE BLDC GLUCOMTR-MCNC: 433
GLUCOSE SERPL-MCNC: 224 MG/DL — HIGH (ref 70–99)
GLUCOSE UR QL: 500 MG/DL
HBA1C MFR BLD HPLC: 8.1
HCT VFR BLD CALC: 36.8 % — SIGNIFICANT CHANGE UP (ref 34.5–45)
HGB BLD-MCNC: 11.2 G/DL — LOW (ref 11.5–15.5)
IMM GRANULOCYTES NFR BLD AUTO: 0.7 % — SIGNIFICANT CHANGE UP (ref 0–0.9)
KETONES UR-MCNC: ABNORMAL MG/DL
LEUKOCYTE ESTERASE UR-ACNC: NEGATIVE — SIGNIFICANT CHANGE UP
LYMPHOCYTES # BLD AUTO: 0.89 K/UL — LOW (ref 1–3.3)
LYMPHOCYTES # BLD AUTO: 8.3 % — LOW (ref 13–44)
MCHC RBC-ENTMCNC: 24.1 PG — LOW (ref 27–34)
MCHC RBC-ENTMCNC: 30.4 G/DL — LOW (ref 32–36)
MCV RBC AUTO: 79.3 FL — LOW (ref 80–100)
MONOCYTES # BLD AUTO: 0.82 K/UL — SIGNIFICANT CHANGE UP (ref 0–0.9)
MONOCYTES NFR BLD AUTO: 7.7 % — SIGNIFICANT CHANGE UP (ref 2–14)
NEUTROPHILS # BLD AUTO: 8.89 K/UL — HIGH (ref 1.8–7.4)
NEUTROPHILS NFR BLD AUTO: 83 % — HIGH (ref 43–77)
NITRITE UR-MCNC: NEGATIVE — SIGNIFICANT CHANGE UP
NRBC BLD AUTO-RTO: 0 /100 WBCS — SIGNIFICANT CHANGE UP (ref 0–0)
PH UR: 5.5 — SIGNIFICANT CHANGE UP (ref 5–8)
PLATELET # BLD AUTO: 255 K/UL — SIGNIFICANT CHANGE UP (ref 150–400)
POTASSIUM SERPL-MCNC: 5.3 MMOL/L — SIGNIFICANT CHANGE UP (ref 3.5–5.3)
POTASSIUM SERPL-SCNC: 5.3 MMOL/L — SIGNIFICANT CHANGE UP (ref 3.5–5.3)
PROT SERPL-MCNC: 7.1 G/DL — SIGNIFICANT CHANGE UP (ref 6–8.3)
PROT UR-MCNC: NEGATIVE MG/DL — SIGNIFICANT CHANGE UP
RBC # BLD: 4.64 M/UL — SIGNIFICANT CHANGE UP (ref 3.8–5.2)
RBC # FLD: 16.3 % — HIGH (ref 10.3–14.5)
RSV RNA NPH QL NAA+NON-PROBE: SIGNIFICANT CHANGE UP
SARS-COV-2 RNA SPEC QL NAA+PROBE: SIGNIFICANT CHANGE UP
SODIUM SERPL-SCNC: 142 MMOL/L — SIGNIFICANT CHANGE UP (ref 135–145)
SOURCE RESPIRATORY: SIGNIFICANT CHANGE UP
SP GR SPEC: 1.03 — SIGNIFICANT CHANGE UP (ref 1–1.03)
TROPONIN T, HIGH SENSITIVITY RESULT: 30 NG/L — SIGNIFICANT CHANGE UP (ref 0–51)
UROBILINOGEN FLD QL: 0.2 MG/DL — SIGNIFICANT CHANGE UP (ref 0.2–1)
WBC # BLD: 10.71 K/UL — HIGH (ref 3.8–10.5)
WBC # FLD AUTO: 10.71 K/UL — HIGH (ref 3.8–10.5)

## 2025-05-07 PROCEDURE — 36410 VNPNXR 3YR/> PHY/QHP DX/THER: CPT

## 2025-05-07 PROCEDURE — 76937 US GUIDE VASCULAR ACCESS: CPT | Mod: 26

## 2025-05-07 PROCEDURE — 95251 CONT GLUC MNTR ANALYSIS I&R: CPT

## 2025-05-07 PROCEDURE — 71045 X-RAY EXAM CHEST 1 VIEW: CPT | Mod: 26

## 2025-05-07 PROCEDURE — 82962 GLUCOSE BLOOD TEST: CPT

## 2025-05-07 PROCEDURE — 99214 OFFICE O/P EST MOD 30 MIN: CPT | Mod: 25

## 2025-05-07 PROCEDURE — 83036 HEMOGLOBIN GLYCOSYLATED A1C: CPT | Mod: QW

## 2025-05-07 PROCEDURE — 93010 ELECTROCARDIOGRAM REPORT: CPT

## 2025-05-07 PROCEDURE — 96372 THER/PROPH/DIAG INJ SC/IM: CPT

## 2025-05-07 PROCEDURE — 99223 1ST HOSP IP/OBS HIGH 75: CPT | Mod: FS

## 2025-05-07 RX ORDER — DEXTROSE 50 % IN WATER 50 %
25 SYRINGE (ML) INTRAVENOUS ONCE
Refills: 0 | Status: ACTIVE | OUTPATIENT
Start: 2025-05-07

## 2025-05-07 RX ORDER — NIFEDIPINE 30 MG
60 TABLET, EXTENDED RELEASE 24 HR ORAL DAILY
Refills: 0 | Status: ACTIVE | OUTPATIENT
Start: 2025-05-07 | End: 2026-04-05

## 2025-05-07 RX ORDER — MEXILETINE HYDROCHLORIDE 250 MG/1
200 CAPSULE ORAL EVERY 8 HOURS
Refills: 0 | Status: ACTIVE | OUTPATIENT
Start: 2025-05-07 | End: 2026-04-05

## 2025-05-07 RX ORDER — SODIUM CHLORIDE 9 G/1000ML
1000 INJECTION, SOLUTION INTRAVENOUS
Refills: 0 | Status: ACTIVE | OUTPATIENT
Start: 2025-05-07 | End: 2026-04-05

## 2025-05-07 RX ORDER — IPRATROPIUM BROMIDE AND ALBUTEROL SULFATE .5; 2.5 MG/3ML; MG/3ML
3 SOLUTION RESPIRATORY (INHALATION) EVERY 6 HOURS
Refills: 0 | Status: ACTIVE | OUTPATIENT
Start: 2025-05-07 | End: 2026-04-05

## 2025-05-07 RX ORDER — METHYLPREDNISOLONE ACETATE 80 MG/ML
8 INJECTION, SUSPENSION INTRA-ARTICULAR; INTRALESIONAL; INTRAMUSCULAR; SOFT TISSUE DAILY
Refills: 0 | Status: ACTIVE | OUTPATIENT
Start: 2025-05-07 | End: 2026-04-05

## 2025-05-07 RX ORDER — POLYETHYLENE GLYCOL 3350 17 G/17G
17 POWDER, FOR SOLUTION ORAL DAILY
Refills: 0 | Status: ACTIVE | OUTPATIENT
Start: 2025-05-07 | End: 2026-04-05

## 2025-05-07 RX ORDER — ROSUVASTATIN CALCIUM 20 MG/1
5 TABLET, FILM COATED ORAL AT BEDTIME
Refills: 0 | Status: ACTIVE | OUTPATIENT
Start: 2025-05-07 | End: 2026-04-05

## 2025-05-07 RX ORDER — DEXTROSE 50 % IN WATER 50 %
15 SYRINGE (ML) INTRAVENOUS ONCE
Refills: 0 | Status: ACTIVE | OUTPATIENT
Start: 2025-05-07 | End: 2026-04-05

## 2025-05-07 RX ORDER — GLYCOPYRROLATE 0.2 MG/ML
1 INJECTION INTRAMUSCULAR; INTRAVENOUS THREE TIMES A DAY
Refills: 0 | Status: ACTIVE | OUTPATIENT
Start: 2025-05-07 | End: 2026-04-05

## 2025-05-07 RX ORDER — SERTRALINE 100 MG/1
50 TABLET, FILM COATED ORAL DAILY
Refills: 0 | Status: ACTIVE | OUTPATIENT
Start: 2025-05-07 | End: 2026-04-05

## 2025-05-07 RX ORDER — INSULIN LISPRO 100 U/ML
INJECTION, SOLUTION INTRAVENOUS; SUBCUTANEOUS AT BEDTIME
Refills: 0 | Status: ACTIVE | OUTPATIENT
Start: 2025-05-07 | End: 2026-04-05

## 2025-05-07 RX ORDER — LABETALOL HYDROCHLORIDE 200 MG/1
100 TABLET, FILM COATED ORAL EVERY 8 HOURS
Refills: 0 | Status: ACTIVE | OUTPATIENT
Start: 2025-05-07 | End: 2026-04-05

## 2025-05-07 RX ORDER — INSULIN LISPRO 100 [IU]/ML
100 INJECTION, SOLUTION INTRAVENOUS; SUBCUTANEOUS
Qty: 0 | Refills: 0 | Status: COMPLETED | OUTPATIENT
Start: 2025-05-07

## 2025-05-07 RX ORDER — INSULIN LISPRO 100 U/ML
4 INJECTION, SOLUTION INTRAVENOUS; SUBCUTANEOUS
Refills: 0 | Status: ACTIVE | OUTPATIENT
Start: 2025-05-07 | End: 2026-04-05

## 2025-05-07 RX ORDER — INSULIN GLARGINE-YFGN 100 [IU]/ML
14 INJECTION, SOLUTION SUBCUTANEOUS AT BEDTIME
Refills: 0 | Status: ACTIVE | OUTPATIENT
Start: 2025-05-07 | End: 2026-04-05

## 2025-05-07 RX ORDER — MONTELUKAST SODIUM 10 MG/1
10 TABLET ORAL DAILY
Refills: 0 | Status: ACTIVE | OUTPATIENT
Start: 2025-05-07 | End: 2026-04-05

## 2025-05-07 RX ORDER — TIOTROPIUM BROMIDE INHALATION SPRAY 3.12 UG/1
2 SPRAY, METERED RESPIRATORY (INHALATION) DAILY
Refills: 0 | Status: ACTIVE | OUTPATIENT
Start: 2025-05-07 | End: 2026-04-05

## 2025-05-07 RX ORDER — INSULIN LISPRO 100 U/ML
INJECTION, SOLUTION INTRAVENOUS; SUBCUTANEOUS
Refills: 0 | Status: ACTIVE | OUTPATIENT
Start: 2025-05-07 | End: 2026-04-05

## 2025-05-07 RX ORDER — LACOSAMIDE 150 MG/1
100 TABLET, FILM COATED ORAL
Refills: 0 | Status: ACTIVE | OUTPATIENT
Start: 2025-05-07 | End: 2025-06-06

## 2025-05-07 RX ORDER — DEXTROSE 50 % IN WATER 50 %
12.5 SYRINGE (ML) INTRAVENOUS ONCE
Refills: 0 | Status: ACTIVE | OUTPATIENT
Start: 2025-05-07

## 2025-05-07 RX ORDER — GLUCAGON 3 MG/1
1 POWDER NASAL ONCE
Refills: 0 | Status: ACTIVE | OUTPATIENT
Start: 2025-05-07 | End: 2026-04-05

## 2025-05-07 RX ORDER — LEVETIRACETAM 10 MG/ML
1000 INJECTION, SOLUTION INTRAVENOUS
Refills: 0 | Status: ACTIVE | OUTPATIENT
Start: 2025-05-07 | End: 2026-04-05

## 2025-05-07 RX ADMIN — INSULIN LISPRO 4 UNIT(S): 100 INJECTION, SOLUTION INTRAVENOUS; SUBCUTANEOUS at 20:32

## 2025-05-07 RX ADMIN — Medication 1 DOSE(S): at 22:01

## 2025-05-07 RX ADMIN — LABETALOL HYDROCHLORIDE 100 MILLIGRAM(S): 200 TABLET, FILM COATED ORAL at 22:02

## 2025-05-07 RX ADMIN — ROSUVASTATIN CALCIUM 5 MILLIGRAM(S): 20 TABLET, FILM COATED ORAL at 22:01

## 2025-05-07 RX ADMIN — Medication 500 MILLILITER(S): at 14:19

## 2025-05-07 RX ADMIN — INSULIN GLARGINE-YFGN 14 UNIT(S): 100 INJECTION, SOLUTION SUBCUTANEOUS at 22:02

## 2025-05-07 RX ADMIN — TIOTROPIUM BROMIDE INHALATION SPRAY 2 PUFF(S): 3.12 SPRAY, METERED RESPIRATORY (INHALATION) at 21:58

## 2025-05-07 RX ADMIN — IPRATROPIUM BROMIDE AND ALBUTEROL SULFATE 3 MILLILITER(S): .5; 2.5 SOLUTION RESPIRATORY (INHALATION) at 22:01

## 2025-05-07 RX ADMIN — GLYCOPYRROLATE 1 MILLIGRAM(S): 0.2 INJECTION INTRAMUSCULAR; INTRAVENOUS at 22:02

## 2025-05-07 RX ADMIN — MEXILETINE HYDROCHLORIDE 200 MILLIGRAM(S): 250 CAPSULE ORAL at 22:01

## 2025-05-07 NOTE — ED CDU PROVIDER INITIAL DAY NOTE - PHYSICAL EXAMINATION
Gen: AAO x 3, NAD  HEENT: NC/AT, PERRLA, EOMI, MMM, left infraorbital hematoma  Resp: unlabored CTAB  Cardiac: rrr s1s2  GI: ND, +BS, Soft, NT  Ext: no pedal edema, FROM in all extremities  Neuro: no focal deficits

## 2025-05-07 NOTE — ED PROCEDURE NOTE - PROCEDURE ADDITIONAL DETAILS
Ultrasound-guided cannulation of a peripheral vein in the upper extremity    Dynamic gray scale imaging of the target vessel was obtained using a high frequency linear transducer.   Both the target vein and surrounding arterial structures were visualized and identified.   The patency of the targeted vein was confirmed with compression and lack of internal echoes.   There was direct visualization of needle entry into the vein followed by successful catheter placement  18g piv shiloh Henry D.O. -ED attending

## 2025-05-07 NOTE — CHART NOTE - NSCHARTNOTEFT_GEN_A_CORE
RAYRAY RODRIGUEZ  [id number]  [facility] ED    The patient is a 74yFemale with PMH of Epilepsy on Keppra, COPD, asthma  (on CPAP and VATS at home, on chronic steroids), DM2, bronchiectasis, tracheomalacia s/p tracheloplasty, HTN, Hx of dissection of descending thoracic aorta, hx of aortic aneurysm, Type B dissection (7/2024), medically managed initially as she did not meet criteria for surgery at that time), evaluated by Dr. Antonio, Type A dissection s/p bioAVR with Bental procedure (9/2022), severe AS s/p TAVR (9/10/2023), TIA's, DVT, Afib on Eliquis,  Colon cancer S/P resection, colostomy bag, neuropathy, recent hospitalization for hallucinations, falls presents with hyperglycemia x several days.    Endocrinology consulted for T2DM c/b hyperglycemia.    #Uncontrolled Type 2 Diabetes Mellitus  - pt follows outpt w/ Dr. Panchal, was also seen by our team inpatient in 4/2025  - A1C with Estimated Average Glucose Result: 8.0 % (04-18-25 @ 10:27)  - home regimen: Lantus 12 units qhs, januvia 100mg daily. Also on methylprednisolone 8mg daily  - eGFR: 91 mL/min/1.73m2 (05-07-25)  - Weight (kg): 45 (05-07-25)  - glucose 300-400s per daughter, no evidence of DKA on labs  - insulin improving now s/p 6 units rapid-acting insulin at endo office today     PLAN:  - Recommend Lantus 14 units QHS   - Recommend Admelog 4 units TID before meals (HOLD if NPO or not eating)  - Recommend low dose Admelog correction scale TID before meals and separate low dose scale QHS  - Please check FSG before meals and QHS, or q6h while NPO  - Inpatient glucose goals: 100-200  - consistent carb diet when eating    Discussed with primary team. Full consult to follow in the AM.    Bradley Zamora MD  Endocrine Fellow  For nonurgent matters, please email [email or web address] or [email or web address]. For urgent matters only, please call answering service at [phone] (weekdays), [phone] (nights/weekends). RAYRAY RODRIGUEZ  [id number]  [facility] ED    The patient is a 74yFemale with PMH of Epilepsy on Keppra, COPD, asthma  (on CPAP and VATS at home, on chronic steroids), DM2, bronchiectasis, tracheomalacia s/p tracheloplasty, HTN, Hx of dissection of descending thoracic aorta, hx of aortic aneurysm, Type B dissection (7/2024), medically managed initially as she did not meet criteria for surgery at that time), evaluated by Dr. Antonio, Type A dissection s/p bioAVR with Bental procedure (9/2022), severe AS s/p TAVR (9/10/2023), TIA's, DVT, Afib on Eliquis,  Colon cancer S/P resection, colostomy bag, neuropathy, recent hospitalization for hallucinations, falls presents with hyperglycemia x several days.    Endocrinology consulted for T2DM c/b hyperglycemia.    #Uncontrolled Type 2 Diabetes Mellitus  - pt follows outpt w/ Dr. Panchal, was also seen by our team inpatient in 4/2025  - A1C with Estimated Average Glucose Result: 8.0 % (04-18-25 @ 10:27)  - home regimen: Lantus 12 units qhs, januvia 100mg daily. Also on methylprednisolone 8mg daily  - eGFR: 91 mL/min/1.73m2 (05-07-25)  - Weight (kg): 45 (05-07-25)  - glucose has been 300-400s per daughter, no evidence of DKA on labs  - glucose improving now s/p 6 units rapid-acting insulin at endo office today     PLAN:  - Recommend Lantus 14 units QHS   - Recommend Admelog 4 units TID before meals (HOLD if NPO or not eating)  - Recommend low dose Admelog correction scale TID before meals and separate low dose scale QHS  - Please check FSG before meals and QHS, or q6h while NPO  - Inpatient glucose goals: 100-200  - consistent carb diet when eating    Discussed with primary team. Full consult to follow in the AM.    Bradley Zamora MD  Endocrine Fellow  For nonurgent matters, please email [email or web address] or [email or web address]. For urgent matters only, please call answering service at [phone] (weekdays), [phone] (nights/weekends).

## 2025-05-07 NOTE — ED ADULT NURSE NOTE - ED STAT RN HANDOFF DETAILS 2
Report received from RADHA Martinez patient resting no signs of distress noted stoma patient  Purewick in place 850cc u/o discarded comfort measures provided.

## 2025-05-07 NOTE — ED CDU PROVIDER INITIAL DAY NOTE - CLINICAL SUMMARY MEDICAL DECISION MAKING FREE TEXT BOX
Medical Decision Making / Differential Diagnosis:  HPI concerning for uncontrolled diabetes, possibly due to corticosteroid use for management of her COPD.  Although patient is exceedingly complicated medically, her singular problem at this time is uncontrolled blood sugar.  Will transfer to CDU for Endo evaluation.     ECG directly visualized by me and shows undetermined rhythm, rate 48, unclear IL interval, , QTc 416.  Bifascicular block pattern unchanged from baseline.  **prior similar ECGs were interpreted by cardiology on 4/22/25 as "wide QRS rhythm that is likely regularized AF with a nonspecific IVCD and nonspecific T wave changes."

## 2025-05-07 NOTE — ED PROVIDER NOTE - CLINICAL SUMMARY MEDICAL DECISION MAKING FREE TEXT BOX
Medical Decision Making / Differential Diagnosis:  HPI concerning for uncontrolled diabetes, possibly due to corticosteroid use for management of her COPD.  Patient is exceedingly complicated medically, and may warrant inpatient management of her uncontrolled blood sugar.      ECG directly visualized by me and shows undetermined rhythm, rate 48, unclear KS interval, , QTc 416.  Bifascicular block pattern unchanged from baseline.  **prior similar ECGs were interpreted by cardiology on 4/22/25 as "wide QRS rhythm that is likely regularized AF with a nonspecific IVCD and nonspecific T wave changes." Medical Decision Making / Differential Diagnosis:  HPI concerning for uncontrolled diabetes, possibly due to corticosteroid use for management of her COPD.  Patient is exceedingly complicated medically, and may warrant inpatient vs CDU management of her uncontrolled blood sugar.      ECG directly visualized by me and shows undetermined rhythm, rate 48, unclear TN interval, , QTc 416.  Bifascicular block pattern unchanged from baseline.  **prior similar ECGs were interpreted by cardiology on 4/22/25 as "wide QRS rhythm that is likely regularized AF with a nonspecific IVCD and nonspecific T wave changes."

## 2025-05-07 NOTE — ED PROVIDER NOTE - ATTENDING APP SHARED VISIT CONTRIBUTION OF CARE
Emergency Medicine Attending MD Freitas:  patient seen and evaluated with the PA.  I was present for key portions of the History and Physical, and I agree with the Impression and Plan.      Patient is a 74-year-old female, brought in by EMS from endocrinology office for evaluation of hyperglycemia, blood sugars in the 500s.      Recently admitted 4/17/25-4/24/25, after prolonged admission for   -frequent falls (facial fractures), but no surgical intervention.  Fall occured 2/2 visual hallucinations while ambulating in her room. CT Maxface demonstrating a left periorbital hematoma with a comminuted fracture of the left medial orbital wall and resultant hemorrhagic air-fluid levels in the left ethmoid sinus, extending into the left sphenoid sinus. There is herniation of the orbital fat and small parts of the left medial rectus and inferior rectus muscles that extends into the defect.  -worsening baseline SOB, due to asthma/COPD exacerbation  -visual hallucinations/delirium    MHx:  uncontrolled T2DM (A1C 8%) on bid basal insulin plus Humalog scale PTA.   Bradycardic into the 40s on last admission.  DM c/b TIA/neuropathy. Also h/o epilepsy on Keppra, COPD, asthma  (on CPAP and VATS at home, on chronic steroids), bronchiectasis, tracheomalacia s/p tracheloplasty, HTN, Hx of dissection of descending thoracic aorta, hx of aortic aneurysm, Type B dissection (7/2024), medically managed initially as she did not meet criteria for surgery at that time), Type A dissection s/p bioAVR with Bental procedure (9/2022), severe AS s/p TAVR (9/10/2023), TIA's, DVT, Afib on Eliquis,Colon cancer S/P resection, colostomy bag    VS: Heart rate 45, blood pressure 127/82, respirations 20, afebrile, O2 sat 99% on room air  Gen: elderly female in NAD.  Old facial ecchymoses and swelling appreciated  Head: Left zygoma swelling and old healing ecchymosis present.     Eyes are somewhat protuberant, but patient endorses this is normal baseline for her, PERRL, EOMI.    Neck: trachea midline, supple.    Resp:  No distress, CTA B.    Cardiac RRR, no RMG.    Abdomen:  soft, nondistended, nontender; no R/G.  Ext: no deformities, no edema.    Neuro:  A&Ox4 appears non focal.  Moving all 4 extremities equally  Skin:  thin skin, otherwise arm and dry as visualized, no rash.     Psych:  Normal affect and mood.    Medical Decision Making / Differential Diagnosis:  HPI concerning for uncontrolled diabetes, possibly due to corticosteroid use for management of her COPD.  Patient is exceedingly complicated medically, and may warrant inpatient management of her uncontrolled blood sugar.      ECG directly visualized by me and shows undetermined rhythm, rate 48, unclear CT interval, , QTc 416.  Bifascicular block pattern unchanged from baseline.  **prior similar ECGs were interpreted by cardiology on 4/22/25 as "wide QRS rhythm that is likely regularized AF with a nonspecific IVCD and nonspecific T wave changes."

## 2025-05-07 NOTE — ED CDU PROVIDER INITIAL DAY NOTE - ATTENDING APP SHARED VISIT CONTRIBUTION OF CARE
Emergency Medicine Attending MD Freitas:  patient seen and evaluated with the PA.  I was present for key portions of the History and Physical, and I agree with the Impression and Plan.      Patient is a 74-year-old female, brought in by EMS from endocrinology office for evaluation of hyperglycemia, blood sugars in the 500s.      Recently admitted 4/17/25-4/24/25, after prolonged admission for   -frequent falls (facial fractures), but no surgical intervention.  Fall occured 2/2 visual hallucinations while ambulating in her room. CT Maxface demonstrating a left periorbital hematoma with a comminuted fracture of the left medial orbital wall and resultant hemorrhagic air-fluid levels in the left ethmoid sinus, extending into the left sphenoid sinus. There is herniation of the orbital fat and small parts of the left medial rectus and inferior rectus muscles that extends into the defect.  -worsening baseline SOB, due to asthma/COPD exacerbation  -visual hallucinations/delirium    MHx:  uncontrolled T2DM (A1C 8%) on bid basal insulin plus Humalog scale PTA.   Bradycardic into the 40s on last admission.  DM c/b TIA/neuropathy. Also h/o epilepsy on Keppra, COPD, asthma  (on CPAP and VATS at home, on chronic steroids), bronchiectasis, tracheomalacia s/p tracheloplasty, HTN, Hx of dissection of descending thoracic aorta, hx of aortic aneurysm, Type B dissection (7/2024), medically managed initially as she did not meet criteria for surgery at that time), Type A dissection s/p bioAVR with Bental procedure (9/2022), severe AS s/p TAVR (9/10/2023), TIA's, DVT, Afib on Eliquis,Colon cancer S/P resection, colostomy bag    VS: Heart rate 45, blood pressure 127/82, respirations 20, afebrile, O2 sat 99% on room air  Gen: elderly female in NAD.  Old facial ecchymoses and swelling appreciated  Head: Left zygoma swelling and old healing ecchymosis present.     Eyes are somewhat protuberant, but patient endorses this is normal baseline for her, PERRL, EOMI.    Neck: trachea midline, supple.    Resp:  No distress, CTA B.    Cardiac RRR, no RMG.    Abdomen:  soft, nondistended, nontender; no R/G.  Ext: no deformities, no edema.    Neuro:  A&Ox4 appears non focal.  Moving all 4 extremities equally  Skin:  thin skin, otherwise arm and dry as visualized, no rash.     Psych:  Normal affect and mood.    Medical Decision Making / Differential Diagnosis:  HPI concerning for uncontrolled diabetes, possibly due to corticosteroid use for management of her COPD.  Although patient is exceedingly complicated medically, her singular problem at this time is uncontrolled blood sugar.  Will transfer to CDU for Endo evaluation.     ECG directly visualized by me and shows undetermined rhythm, rate 48, unclear PA interval, , QTc 416.  Bifascicular block pattern unchanged from baseline.  **prior similar ECGs were interpreted by cardiology on 4/22/25 as "wide QRS rhythm that is likely regularized AF with a nonspecific IVCD and nonspecific T wave changes."

## 2025-05-07 NOTE — ED CDU PROVIDER INITIAL DAY NOTE - PROGRESS NOTE DETAILS
Patient asking about obtaining xray/CT of her chest, reporting that she is still coughing up blood since last admission/fall. Spoke with patient's daughter Zoe on the phone with patients permission. Zoe states that since discharge at the end of last month she was told to follow up with a chest xray and CT of her chest. Has CT scheduled but is asking if patient can obtain chest xray as was not able to follow up after discharge with xray. Patient agreeable to chest xray, order placed. Patient otherwise in NAD, well appearing, conversational, no hemoptysis visualized, no increased work of breathing. - Radha Miguel PA-C Patient asking about obtaining xray/CT of her chest, reporting that she is still coughing up blood since last admission/fall. Spoke with patient's daughter Zoe on the phone with patients permission. Zoe states that since discharge at the end of last month she was told to follow up with a chest xray and CT of her chest, has had improvement overall in blood with coughing. Has CT scheduled but is asking if patient can obtain chest xray as was not able to follow up after discharge with xray. Patient agreeable to chest xray, order placed. Patient otherwise in NAD, well appearing, conversational, no hemoptysis visualized, stable O2 sat on room air, no increased work of breathing. - Radha Miguel PA-C

## 2025-05-07 NOTE — ED PROVIDER NOTE - WHICH SHOWED
ECG directly visualized by me and shows undetermined rhythm, rate 48, unclear IA interval, , QTc 416.  Bifascicular block pattern unchanged from baseline.  **prior similar ECGs were interpreted by cardiology on 4/22/25 as "wide QRS rhythm that is likely regularized AF with a nonspecific IVCD and nonspecific T wave changes."

## 2025-05-07 NOTE — ED CDU PROVIDER INITIAL DAY NOTE - OBJECTIVE STATEMENT
75 yo Fwith a PMH of Epilepsy on Keppra, COPD, asthma  (on CPAP and VATS at home, on chronic steroids), DM2, bronchiectasis, tracheomalacia s/p tracheloplasty, HTN, Hx of dissection of descending thoracic aorta, hx of aortic aneurysm, Type B dissection (7/2024), medically managed initially as she did not meet criteria for surgery at that time), evaluated by Dr. Antonio, Type A dissection s/p bioAVR with Bental procedure (9/2022), severe AS s/p TAVR (9/10/2023), TIA's, DVT, Afib on Eliquis,  Colon cancer S/P resection, colostomy bag, neuropathy, recent hospitalization for hallucinations, falls presents with hyperglycemia x several days. Spoke with daughter, Zoe, states while pt was admitted she was receiving Lantus BID and Novolog TID before meals, was transitioned to Januvia and Lantus qhs only at discharge. Daughter concerned she does not have enough insulin coverage. Pt otherwise w/o complaints. Compliant with all medications.    In the ED VSS.  Labs initially remarkable for blood glucose of 374, now 174 s/p IV fluids.  Patient feeling well.  Plan to observe in the CDU overnight and endocrinology evaluation in the a.m.

## 2025-05-07 NOTE — ED ADULT NURSE REASSESSMENT NOTE - NS ED NURSE REASSESS COMMENT FT1
Report received from RADHA Pablo. PT placed on cardiac monitor with safety/comfort measures provided.

## 2025-05-07 NOTE — ED PROVIDER NOTE - OBJECTIVE STATEMENT
73 yo Fwith a PMH of Epilepsy on Keppra, COPD, asthma  (on CPAP and VATS at home, on chronic steroids), DM2, bronchiectasis, tracheomalacia s/p tracheloplasty, HTN, Hx of dissection of descending thoracic aorta, hx of aortic aneurysm, Type B dissection (7/2024), medically managed initially as she did not meet criteria for surgery at that time), evaluated by Dr. Antonio, Type A dissection s/p bioAVR with Bental procedure (9/2022), severe AS s/p TAVR (9/10/2023), TIA's, DVT, Afib on Eliquis,  Colon cancer S/P resection, colostomy bag, neuropathy, recent hospitalization for hallucinations, falls presents with hyperglycemia x several days. Spoke with daughter, Zoe, states while pt was admitted she was receiving lantus BID and novolong TID before meals, was transitioned to Januvia and Lantus qhs only at discharge. Daughter concerned she does not have enough insulin coverage. Pt otherwise w/o complaints. Compliant with all medications.  Per chart review, pts recent admission 04/17-04/23/25 notable for initial presentation to for hallucinations and fall. In ED  noted to be tachypneic and bradycardic admitted to medicine for further management of asthma/COPD exacerbation, seen by ID and Pulm. Recommedned holding steroids and was given IV steroids. Found to have multiple chronic microhemorrhages in both cerebral hemispheres with additional chronic microhemorrhages seen in the rocky and cerebellum on MRI, evaluated by Neurology and [facility]. Also had new TWI, seen by Cards- low suspicion for ACS, course cb fall in hospital 2/2 hallucination, CT showed Left periorbital hematoma with a comminuted fracture of the left medial orbital wall and resultant hemorrhagic air-fluid levels in the left ethmoid sinus, extending into the left sphenoid sinus- Plastic surgery and ophthalmology were c/s, stopped AC/AP (eliquis and plavix) until outpt f/u. Pt noted to have several episodes of hypoglycemia in the hospital. Endocrinology was c/s and down-titrated pt's insulin. Endo's discharge recs include: lantus 12 qhs and januvia 100 mg qd.

## 2025-05-08 VITALS
TEMPERATURE: 98 F | DIASTOLIC BLOOD PRESSURE: 77 MMHG | OXYGEN SATURATION: 98 % | HEART RATE: 62 BPM | SYSTOLIC BLOOD PRESSURE: 122 MMHG | RESPIRATION RATE: 18 BRPM

## 2025-05-08 DIAGNOSIS — E11.9 TYPE 2 DIABETES MELLITUS WITHOUT COMPLICATIONS: ICD-10-CM

## 2025-05-08 DIAGNOSIS — I10 ESSENTIAL (PRIMARY) HYPERTENSION: ICD-10-CM

## 2025-05-08 DIAGNOSIS — E78.5 HYPERLIPIDEMIA, UNSPECIFIED: ICD-10-CM

## 2025-05-08 LAB
A1C WITH ESTIMATED AVERAGE GLUCOSE RESULT: 8.5 % — HIGH (ref 4–5.6)
ALBUMIN SERPL ELPH-MCNC: 4.7 G/DL — SIGNIFICANT CHANGE UP (ref 3.3–5)
ALP SERPL-CCNC: 80 U/L — SIGNIFICANT CHANGE UP (ref 40–120)
ALT FLD-CCNC: 15 U/L — SIGNIFICANT CHANGE UP (ref 10–45)
ANION GAP SERPL CALC-SCNC: 12 MMOL/L — SIGNIFICANT CHANGE UP (ref 5–17)
AST SERPL-CCNC: 16 U/L — SIGNIFICANT CHANGE UP (ref 10–40)
BASOPHILS # BLD AUTO: 0.03 K/UL — SIGNIFICANT CHANGE UP (ref 0–0.2)
BASOPHILS NFR BLD AUTO: 0.3 % — SIGNIFICANT CHANGE UP (ref 0–2)
BILIRUB SERPL-MCNC: 0.2 MG/DL — SIGNIFICANT CHANGE UP (ref 0.2–1.2)
BUN SERPL-MCNC: 10 MG/DL — SIGNIFICANT CHANGE UP (ref 7–23)
CALCIUM SERPL-MCNC: 8.7 MG/DL — SIGNIFICANT CHANGE UP (ref 8.4–10.5)
CHLORIDE SERPL-SCNC: 107 MMOL/L — SIGNIFICANT CHANGE UP (ref 96–108)
CO2 SERPL-SCNC: 22 MMOL/L — SIGNIFICANT CHANGE UP (ref 22–31)
CREAT SERPL-MCNC: 0.53 MG/DL — SIGNIFICANT CHANGE UP (ref 0.5–1.3)
EGFR: 96 ML/MIN/1.73M2 — SIGNIFICANT CHANGE UP
EGFR: 96 ML/MIN/1.73M2 — SIGNIFICANT CHANGE UP
EOSINOPHIL # BLD AUTO: 0.13 K/UL — SIGNIFICANT CHANGE UP (ref 0–0.5)
EOSINOPHIL NFR BLD AUTO: 1.5 % — SIGNIFICANT CHANGE UP (ref 0–6)
ESTIMATED AVERAGE GLUCOSE: 197 MG/DL — HIGH (ref 68–114)
GLUCOSE BLDC GLUCOMTR-MCNC: 196 MG/DL — HIGH (ref 70–99)
GLUCOSE BLDC GLUCOMTR-MCNC: 202 MG/DL — HIGH (ref 70–99)
GLUCOSE BLDC GLUCOMTR-MCNC: 211 MG/DL — HIGH (ref 70–99)
GLUCOSE BLDC GLUCOMTR-MCNC: 240 MG/DL — HIGH (ref 70–99)
GLUCOSE SERPL-MCNC: 228 MG/DL — HIGH (ref 70–99)
HCT VFR BLD CALC: 35.3 % — SIGNIFICANT CHANGE UP (ref 34.5–45)
HGB BLD-MCNC: 11.2 G/DL — LOW (ref 11.5–15.5)
IMM GRANULOCYTES NFR BLD AUTO: 0.8 % — SIGNIFICANT CHANGE UP (ref 0–0.9)
LYMPHOCYTES # BLD AUTO: 1.69 K/UL — SIGNIFICANT CHANGE UP (ref 1–3.3)
LYMPHOCYTES # BLD AUTO: 19.1 % — SIGNIFICANT CHANGE UP (ref 13–44)
MCHC RBC-ENTMCNC: 24.5 PG — LOW (ref 27–34)
MCHC RBC-ENTMCNC: 31.7 G/DL — LOW (ref 32–36)
MCV RBC AUTO: 77.2 FL — LOW (ref 80–100)
MONOCYTES # BLD AUTO: 1.23 K/UL — HIGH (ref 0–0.9)
MONOCYTES NFR BLD AUTO: 13.9 % — SIGNIFICANT CHANGE UP (ref 2–14)
NEUTROPHILS # BLD AUTO: 5.68 K/UL — SIGNIFICANT CHANGE UP (ref 1.8–7.4)
NEUTROPHILS NFR BLD AUTO: 64.4 % — SIGNIFICANT CHANGE UP (ref 43–77)
NRBC BLD AUTO-RTO: 0 /100 WBCS — SIGNIFICANT CHANGE UP (ref 0–0)
PLATELET # BLD AUTO: 250 K/UL — SIGNIFICANT CHANGE UP (ref 150–400)
POTASSIUM SERPL-MCNC: 3.5 MMOL/L — SIGNIFICANT CHANGE UP (ref 3.5–5.3)
POTASSIUM SERPL-SCNC: 3.5 MMOL/L — SIGNIFICANT CHANGE UP (ref 3.5–5.3)
PROT SERPL-MCNC: 6.3 G/DL — SIGNIFICANT CHANGE UP (ref 6–8.3)
RBC # BLD: 4.57 M/UL — SIGNIFICANT CHANGE UP (ref 3.8–5.2)
RBC # FLD: 15.9 % — HIGH (ref 10.3–14.5)
SODIUM SERPL-SCNC: 141 MMOL/L — SIGNIFICANT CHANGE UP (ref 135–145)
WBC # BLD: 8.83 K/UL — SIGNIFICANT CHANGE UP (ref 3.8–10.5)
WBC # FLD AUTO: 8.83 K/UL — SIGNIFICANT CHANGE UP (ref 3.8–10.5)

## 2025-05-08 PROCEDURE — 99238 HOSP IP/OBS DSCHRG MGMT 30/<: CPT

## 2025-05-08 PROCEDURE — 85018 HEMOGLOBIN: CPT

## 2025-05-08 PROCEDURE — 96372 THER/PROPH/DIAG INJ SC/IM: CPT

## 2025-05-08 PROCEDURE — 81003 URINALYSIS AUTO W/O SCOPE: CPT

## 2025-05-08 PROCEDURE — 82962 GLUCOSE BLOOD TEST: CPT

## 2025-05-08 PROCEDURE — 36410 VNPNXR 3YR/> PHY/QHP DX/THER: CPT

## 2025-05-08 PROCEDURE — 82947 ASSAY GLUCOSE BLOOD QUANT: CPT

## 2025-05-08 PROCEDURE — 84295 ASSAY OF SERUM SODIUM: CPT

## 2025-05-08 PROCEDURE — 85014 HEMATOCRIT: CPT

## 2025-05-08 PROCEDURE — 71045 X-RAY EXAM CHEST 1 VIEW: CPT

## 2025-05-08 PROCEDURE — 0241U: CPT

## 2025-05-08 PROCEDURE — 85025 COMPLETE CBC W/AUTO DIFF WBC: CPT

## 2025-05-08 PROCEDURE — 82435 ASSAY OF BLOOD CHLORIDE: CPT

## 2025-05-08 PROCEDURE — 82330 ASSAY OF CALCIUM: CPT

## 2025-05-08 PROCEDURE — 94640 AIRWAY INHALATION TREATMENT: CPT

## 2025-05-08 PROCEDURE — 99285 EMERGENCY DEPT VISIT HI MDM: CPT | Mod: 25

## 2025-05-08 PROCEDURE — 80053 COMPREHEN METABOLIC PANEL: CPT

## 2025-05-08 PROCEDURE — 83036 HEMOGLOBIN GLYCOSYLATED A1C: CPT

## 2025-05-08 PROCEDURE — 76937 US GUIDE VASCULAR ACCESS: CPT

## 2025-05-08 PROCEDURE — 84132 ASSAY OF SERUM POTASSIUM: CPT

## 2025-05-08 PROCEDURE — G0378: CPT

## 2025-05-08 PROCEDURE — 82010 KETONE BODYS QUAN: CPT

## 2025-05-08 PROCEDURE — 82803 BLOOD GASES ANY COMBINATION: CPT

## 2025-05-08 PROCEDURE — 83605 ASSAY OF LACTIC ACID: CPT

## 2025-05-08 PROCEDURE — 87637 SARSCOV2&INF A&B&RSV AMP PRB: CPT

## 2025-05-08 PROCEDURE — 99284 EMERGENCY DEPT VISIT MOD MDM: CPT

## 2025-05-08 PROCEDURE — 84484 ASSAY OF TROPONIN QUANT: CPT

## 2025-05-08 RX ADMIN — METHYLPREDNISOLONE ACETATE 8 MILLIGRAM(S): 80 INJECTION, SUSPENSION INTRA-ARTICULAR; INTRALESIONAL; INTRAMUSCULAR; SOFT TISSUE at 06:27

## 2025-05-08 RX ADMIN — INSULIN LISPRO 2: 100 INJECTION, SOLUTION INTRAVENOUS; SUBCUTANEOUS at 12:49

## 2025-05-08 RX ADMIN — LACOSAMIDE 100 MILLIGRAM(S): 150 TABLET, FILM COATED ORAL at 05:37

## 2025-05-08 RX ADMIN — IPRATROPIUM BROMIDE AND ALBUTEROL SULFATE 3 MILLILITER(S): .5; 2.5 SOLUTION RESPIRATORY (INHALATION) at 03:49

## 2025-05-08 RX ADMIN — SERTRALINE 50 MILLIGRAM(S): 100 TABLET, FILM COATED ORAL at 12:41

## 2025-05-08 RX ADMIN — INSULIN LISPRO 4 UNIT(S): 100 INJECTION, SOLUTION INTRAVENOUS; SUBCUTANEOUS at 08:32

## 2025-05-08 RX ADMIN — TIOTROPIUM BROMIDE INHALATION SPRAY 2 PUFF(S): 3.12 SPRAY, METERED RESPIRATORY (INHALATION) at 12:40

## 2025-05-08 RX ADMIN — Medication 1 DOSE(S): at 05:38

## 2025-05-08 RX ADMIN — MONTELUKAST SODIUM 10 MILLIGRAM(S): 10 TABLET ORAL at 12:41

## 2025-05-08 RX ADMIN — LEVETIRACETAM 1000 MILLIGRAM(S): 10 INJECTION, SOLUTION INTRAVENOUS at 05:38

## 2025-05-08 RX ADMIN — INSULIN LISPRO 1: 100 INJECTION, SOLUTION INTRAVENOUS; SUBCUTANEOUS at 16:50

## 2025-05-08 RX ADMIN — INSULIN LISPRO 4 UNIT(S): 100 INJECTION, SOLUTION INTRAVENOUS; SUBCUTANEOUS at 12:48

## 2025-05-08 RX ADMIN — Medication 40 MILLIGRAM(S): at 07:39

## 2025-05-08 RX ADMIN — LABETALOL HYDROCHLORIDE 100 MILLIGRAM(S): 200 TABLET, FILM COATED ORAL at 14:45

## 2025-05-08 RX ADMIN — POLYETHYLENE GLYCOL 3350 17 GRAM(S): 17 POWDER, FOR SOLUTION ORAL at 12:41

## 2025-05-08 RX ADMIN — LABETALOL HYDROCHLORIDE 100 MILLIGRAM(S): 200 TABLET, FILM COATED ORAL at 05:38

## 2025-05-08 RX ADMIN — MEXILETINE HYDROCHLORIDE 200 MILLIGRAM(S): 250 CAPSULE ORAL at 05:38

## 2025-05-08 RX ADMIN — GLYCOPYRROLATE 1 MILLIGRAM(S): 0.2 INJECTION INTRAMUSCULAR; INTRAVENOUS at 05:38

## 2025-05-08 RX ADMIN — GLYCOPYRROLATE 1 MILLIGRAM(S): 0.2 INJECTION INTRAMUSCULAR; INTRAVENOUS at 14:46

## 2025-05-08 RX ADMIN — Medication 500 MILLIGRAM(S): at 12:39

## 2025-05-08 RX ADMIN — INSULIN LISPRO 2: 100 INJECTION, SOLUTION INTRAVENOUS; SUBCUTANEOUS at 08:33

## 2025-05-08 RX ADMIN — IPRATROPIUM BROMIDE AND ALBUTEROL SULFATE 3 MILLILITER(S): .5; 2.5 SOLUTION RESPIRATORY (INHALATION) at 11:25

## 2025-05-08 RX ADMIN — MEXILETINE HYDROCHLORIDE 200 MILLIGRAM(S): 250 CAPSULE ORAL at 16:14

## 2025-05-08 RX ADMIN — Medication 60 MILLIGRAM(S): at 05:38

## 2025-05-08 RX ADMIN — INSULIN LISPRO 4 UNIT(S): 100 INJECTION, SOLUTION INTRAVENOUS; SUBCUTANEOUS at 16:49

## 2025-05-08 NOTE — ED CDU PROVIDER DISPOSITION NOTE - PATIENT PORTAL LINK FT
You can access the FollowMyHealth Patient Portal offered by [facility] by registering at the following website: [email or web address]. By joining [employer]’s FollowMyHealth portal, you will also be able to view your health information using other applications (apps) compatible with our system.

## 2025-05-08 NOTE — ED CDU PROVIDER SUBSEQUENT DAY NOTE - HISTORY
No interval changes since initial CDU provider note. Pt without new complaint. NAD VSS. no events on tele. pending endocrine evaluation.  - KIANA Miguel No interval changes since initial CDU provider note. Pt without new complaint. CXR read shows "No focal consolidation, pneumothorax, or pleural effusion." NAD VSS. no events on tele. pending endocrine evaluation.  - KIANA Miguel

## 2025-05-08 NOTE — ED CDU PROVIDER DISPOSITION NOTE - CARE PROVIDER_API CALL
Moreno Panchal  Endocrinology/Metab/Diabetes  [address], [address]  [place] [address]  Phone: [phone]  Fax: [phone]  Follow Up Time: 1-3 Days

## 2025-05-08 NOTE — ED CDU PROVIDER SUBSEQUENT DAY NOTE - PROGRESS NOTE DETAILS
Patient seen at bedside, resting comfortably, NAD. Reports feeling well. Most recent VSS. Pt pending endo evaluation today. Patient seen by Endo Attg Dr. Stevenson, recommending Lantus 12 units once daily and Novolog 4 units TID with meals. Pt is to stop Januvia. Dr. Stevenson spoke with patient's daughter. I also called patient's daughter/emergency contact, Zoe Flores, confirms patient has these medications and diabetic supplies at home. Pt will need transport home. D/c d/w Dr. Juan.

## 2025-05-08 NOTE — ED CDU PROVIDER DISPOSITION NOTE - NSFOLLOWUPINSTRUCTIONS_ED_ALL_ED_FT
Hydrate.      We recommend you follow up with your primary care provider within the next 2-3 days, please bring all of your results with you. You can also follow up with **ENDO      *More detailed information regarding your visit and discharge can be found by reviewing this packet Hydrate.      We recommend you follow up with your primary care provider within the next 2-3 days, please bring all of your results with you.   Please also follow up with your pulmonologist this week.   You can also follow up with **ENDO      *More detailed information regarding your visit and discharge can be found by reviewing this packet Stay well hydrated. Continue consistent carbohydrate diet as discussed    Check your blood sugar before meals and at bedtime.   Continue home insulin regimen as discussed:  12 Units of Lantus at bedtime  4 Units of Novalog with meals    ***For hypoglycemia, monitor and if Finger stick < 70,  drink 4 oz juice and recheck FS in 15 minutes.     Continue your other home medications as prescribed EXCEPT STOP JANUVIA.     Follow up with your primary care provider and Endocrinologist Dr. Panchal upon discharge.     Return to ER for any dizziness, passing out, vomiting, inability to tolerate food/fluid, very high or very low blood sugars, or any other concerns.    Moreno Panchal  Endocrinology/Metab/Diabetes  [address]  [place] [address]  Phone: [phone] Stay well hydrated. Continue consistent carbohydrate diet as discussed    Check your blood sugar before meals and at bedtime.   Continue home insulin regimen as discussed:  12 Units of Lantus at bedtime  4 Units of Novolog with meals    ***For hypoglycemia, monitor and if Finger stick < 70,  drink 4 oz juice and recheck FS in 15 minutes.     Continue your other home medications as prescribed EXCEPT STOP JANUVIA.     Follow up with your primary care provider and Endocrinologist Dr. Panchal upon discharge.     Return to ER for any dizziness, passing out, vomiting, inability to tolerate food/fluid, very high or very low blood sugars, or any other concerns.    Moreno Panchal  Endocrinology/Metab/Diabetes  [address]  [place] [address]  Phone: [phone] Stay well hydrated. Continue consistent carbohydrate diet as discussed    Check your blood sugar before meals and at bedtime.   Continue home insulin regimen as discussed:  12 Units of Lantus at bedtime  4 Units of Novolog with meals    ***For hypoglycemia, monitor and if Finger stick < 70,  drink 4 oz juice and recheck FS in 15 minutes.     Continue your other home medications as prescribed EXCEPT: PLEASE STOP JANUVIA.     Follow up with your primary care provider and Endocrinologist Dr. Panchal upon discharge.     Return to ER for any dizziness, passing out, vomiting, inability to tolerate food/fluid, very high or very low blood sugars, or any other concerns.    Moreno Panchal  Endocrinology/Metab/Diabetes  [address]  [place] [address]  Phone: [phone]

## 2025-05-08 NOTE — CONSULT NOTE ADULT - SUBJECTIVE AND OBJECTIVE BOX
HPI:    Diabetes history: Dx'd age of 16. Initially oral meds but stopped working, where insulin dependent multiple years. Pt follows endocrinologist Dr. Panchal. Next appt June 10th. HbA1c 8.0%. At home, on lantus 22 units qAM and 28 units bedtime plus humalog scale.  Admitted in April 2025 for asthma exacerbation and was on steroid, insulin regimen was reduced due to hypoglycemia.  Because she was needing very little meal time insulin, she was discharged on Lantus 12 units and Januvia once daily.  At endocrinologist office 5/7/25, patient was noted to have severe hypertlgycemia.  She had been adherent with her diet and insulin regimen. Spoke with Dr. Panchal and was advised to use Lantus 12 units in the morning, add on Lantus 18 units in the evening.    She is feeling well today, eating her meal.  No change in her appetite.     PAST MEDICAL & SURGICAL HISTORY:  Seizure  x 1 1/7/18  DVT (deep venous thrombosis)  15-20 years ago, took coumadin  TIA (transient ischemic attack)  multiple, last 5 years ago - presents as right-sided weakness  COPD (chronic obstructive pulmonary disease)  Atrial fibrillation  History of COPD  Afib  Aortic valve replaced  Epilepsy  Asthma  DM (diabetes mellitus)  History of seizure disorder  History of TIAs  HTN (hypertension)  Bronchiectasis  Colon cancer  History of partial hysterectomy  30 years ago - fibroids  H/O total knee replacement, bilateral  5 years ago  History of sinus surgery  multiple sinus surgeries  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  H/O pelvic surgery  5 years ago - s/p fracture  History of tracheomalacia  2015 - attempted tracheal stenting ([facility])- course complicated by obstruction, respiratory failure, multiple CPR attempts -  stent discontinued; 10/20/2016 Tracheobronchoplasty (Prolene Mesh) performed at [facility] by Dr Zapien  S/P bronchoscopy  6/5/2018 - [facility] (Dr Zapien) no evidence of tracheobronchomalacia in trachea or bronchial tubes  Rectal bleeding  exam under anesthesia ([facility]) 2/2018  S/P TAVR (transcatheter aortic valve replacement)  S/P aortic valve replacement  S/P colostomy  S/P AVR (aortic valve replacement)      FAMILY HISTORY:  Family history of asthma    Family history of breast cancer (Sibling)    Family history of diabetes mellitus type II    Social History:  No smoking  No alcohol  NO drugs  Daughter takes care of hr medications    Outpatient Medications:  Home Medications:  ascorbic acid 500 mg oral tablet: 1 tab(s) orally once a day (in the morning) (17 Apr 2025 18:50)  Breo Ellipta 200 mcg-25 mcg/inh inhalation powder: 1 puff(s) inhaled once a day (17 Apr 2025 18:42)  budesonide 1 mg/2 mL inhalation suspension: 2 milliliter(s) by nebulizer 2 times a day (17 Apr 2025 18:42)  Cranberry oral tablet: 1 tab(s) orally once a day (in the morning) (17 Apr 2025 18:50)  DuoNeb 0.5 mg-2.5 mg/3 mL inhalation solution: 3 milliliter(s) by nebulizer 4 times a day (17 Apr 2025 18:42)  famotidine 20 mg oral tablet: 1 tab(s) orally once a day (at bedtime) (18 Apr 2025 12:28)  ferrous sulfate: 325 milligram(s) orally once a day (17 Apr 2025 18:49)  Flonase 50 mcg/inh nasal spray: 1 spray(s) in each nostril once a day (17 Apr 2025 19:35)  glycopyrrolate 1 mg oral tablet: 1 tab(s) orally 3 times a day (17 Apr 2025 18:49)  Incruse Ellipta 62.5 mcg/inh inhalation powder: 1 inhaler(s) inhaled once a day (17 Apr 2025 18:42)  ipratropium 21 mcg/inh (0.03%) nasal spray: 2 spray(s) intranasally once a day (17 Apr 2025 19:35)  Keppra 500 mg oral tablet: 2 tab(s) orally 2 times a day (17 Apr 2025 18:49)  labetalol 100 mg oral tablet: 1 tab(s) orally every 8 hours (17 Apr 2025 18:49)  lacosamide 100 mg oral tablet: 1 tab(s) orally 2 times a day (17 Apr 2025 18:50)  magnesium citrate: 1 dose(s) once a day (18 Apr 2025 12:35)  megestrol 20 mg oral tablet: 1 tab(s) orally once a day as needed for appetite (17 Apr 2025 18:49)  methylPREDNISolone 8 mg oral tablet: 1 tab(s) orally once a day (17 Apr 2025 18:48)  mexiletine 200 mg oral capsule: 1 cap(s) orally 3 times a day (17 Apr 2025 18:49)  mineral oil oral liquid: 30 milliliter(s) orally once a day (17 Apr 2025 18:50)  montelukast 10 mg oral tablet: 1 tab(s) orally once a day (at bedtime) (17 Apr 2025 18:49)  multivitamin: 1 tab(s) orally once a day (17 Apr 2025 18:46)  Ohtuvayre 3 mg/ 2.5mL inhalation suspension: 3 milligram(s) by nebulizer 2 times a day (17 Apr 2025 18:42)  pantoprazole 40 mg oral delayed release tablet: 1 tab(s) orally once a day (17 Apr 2025 18:49)  Perforomist 20 mcg/2 mL inhalation solution: 2 milliliter(s) inhaled 2 times a day (17 Apr 2025 18:42)  polyethylene glycol 3350 oral powder for reconstitution: 17 gram(s) orally once a day (17 Apr 2025 18:50)  rosuvastatin 5 mg oral tablet: 1 tab(s) orally once a day (at bedtime) (17 Apr 2025 18:49)  Senna 8.6 mg oral tablet: 2 tab(s) orally once a day (17 Apr 2025 18:50)  sertraline 50 mg oral tablet: 1 tab(s) orally once a day (in the morning) (24 Apr 2025 12:36)  Sodium Chloride 7% Inhalation Solution: twice daily (17 Apr 2025 18:42)  [place] Pre-filled Pen 210 mg/1.91 mL subcutaneous solution: 210 milligram(s) subcutaneously once a month (17 Apr 2025 18:42)  traMADol 50 mg oral tablet: 1 tab(s) orally every 6 hours as needed for  severe pain (24 Apr 2025 12:50)        MEDICATIONS  (STANDING):  albuterol/ipratropium for Nebulization 3 milliLiter(s) Nebulizer every 6 hours  ascorbic acid 500 milliGRAM(s) Oral daily  dextrose 5%. 1000 milliLiter(s) (50 mL/Hr) IV Continuous <Continuous>  dextrose 5%. 1000 milliLiter(s) (100 mL/Hr) IV Continuous <Continuous>  dextrose 50% Injectable 25 Gram(s) IV Push once  dextrose 50% Injectable 12.5 Gram(s) IV Push once  dextrose 50% Injectable 25 Gram(s) IV Push once  fluticasone propionate/ salmeterol 250-50 MICROgram(s) Diskus 1 Dose(s) Inhalation two times a day  glucagon  Injectable 1 milliGRAM(s) IntraMuscular once  glycopyrrolate 1 milliGRAM(s) Oral three times a day  insulin glargine Injectable (LANTUS) 14 Unit(s) SubCutaneous at bedtime  insulin lispro (ADMELOG) corrective regimen sliding scale   SubCutaneous three times a day before meals  insulin lispro (ADMELOG) corrective regimen sliding scale   SubCutaneous at bedtime  insulin lispro Injectable (ADMELOG) 4 Unit(s) SubCutaneous three times a day before meals  labetalol 100 milliGRAM(s) Oral every 8 hours  lacosamide 100 milliGRAM(s) Oral two times a day  levETIRAcetam 1000 milliGRAM(s) Oral two times a day  methylPREDNISolone 8 milliGRAM(s) Oral daily  mexiletine 200 milliGRAM(s) Oral every 8 hours  montelukast 10 milliGRAM(s) Oral daily  NIFEdipine XL 60 milliGRAM(s) Oral daily  pantoprazole    Tablet 40 milliGRAM(s) Oral before breakfast  polyethylene glycol 3350 17 Gram(s) Oral daily  rosuvastatin 5 milliGRAM(s) Oral at bedtime  sertraline 50 milliGRAM(s) Oral daily  tiotropium 2.5 MICROgram(s) Inhaler 2 Puff(s) Inhalation daily    MEDICATIONS  (PRN):  dextrose Oral Gel 15 Gram(s) Oral once PRN Blood Glucose LESS THAN 70 milliGRAM(s)/deciliter      Allergies    tetanus toxoid (Short breath)  Avelox (Short breath; Pruritus)  ampicillin (Unknown)  Valium (Unknown)  penicillin (Unknown)  Dilaudid (Short breath)  meropenem (Unknown)  aspirin (Unknown)  iodine (Short breath; Swelling)  cefepime (Anaphylaxis)  OxyContin (Unknown)  Percocet (Unknown)  codeine (Short breath)  cefepime (Short breath (Severe))  Valium (Short breath)  shellfish (Anaphylaxis)  penicillin (Anaphylaxis)  Avelox (Unknown)  aspirin (Short breath)  codeine (Unknown)    Intolerances          insulin glargine Injectable (LANTUS)   14 Unit(s) SubCutaneous (05-07-25 @ 22:02)    insulin lispro (ADMELOG) corrective regimen sliding scale   2 Unit(s) SubCutaneous (05-08-25 @ 12:49)   2 Unit(s) SubCutaneous (05-08-25 @ 08:33)    insulin lispro Injectable (ADMELOG)   4 Unit(s) SubCutaneous (05-08-25 @ 12:48)   4 Unit(s) SubCutaneous (05-08-25 @ 08:32)   4 Unit(s) SubCutaneous (05-07-25 @ 20:32)    methylPREDNISolone   8 milliGRAM(s) Oral (05-08-25 @ 06:27)    rosuvastatin   5 milliGRAM(s) Oral (05-07-25 @ 22:01)        Review of Systems:  Constitutional: No fever  Eyes: No blurry vision  Neuro: No tremors  HEENT: No pain  Cardiovascular: No chest pain, palpitations  Respiratory: No SOB, no cough  GI: No nausea, vomiting, abdominal pain  : No dysuria  Skin: no rash  Psych: no depression  Endocrine: no polyuria, polydipsia  Hem/lymph: no swelling  Osteoporosis: no fractures    ALL OTHER SYSTEMS REVIEWED AND NEGATIVE    UNABLE TO OBTAIN    PHYSICAL EXAM:  -----------------------------  VITALS: T(C): 36.9 (05-08-25 @ 08:27)  T(F): 98.4 (05-08-25 @ 08:27), Max: 98.4 (05-08-25 @ 05:21)  HR: 63 (05-08-25 @ 13:51) (59 - 72)  BP: 111/60 (05-08-25 @ 13:51) (111/60 - 135/74)  RR:  (18 - 18)  SpO2:  (95% - 98%)  Wt(kg): --  GENERAL: NAD, well-groomed, well-developed  EYES: No proptosis, no lid lag, anicteric  HEENT:  Atraumatic, Normocephalic, moist mucous membranes  THYROID: Normal size, no palpable nodules  RESPIRATORY: Clear to auscultation bilaterally; No rales, rhonchi, wheezing, or rubs  CARDIOVASCULAR: Regular rate and rhythm; No murmurs; no peripheral edema  GI: Soft, nontender, non distended, normal bowel sounds  SKIN: Dry, intact, No rashes or lesions  MUSCULOSKELETAL: Full range of motion, normal strength  NEURO: sensation intact, extraocular movements intact, no tremor, normal reflexes  PSYCH: Alert and oriented x 3, normal affect, normal mood  CUSHING'S SIGNS: no striae    POCT Blood Glucose.: 211 mg/dL (05-08-25 @ 12:24)  POCT Blood Glucose.: 240 mg/dL (05-08-25 @ 08:25)  POCT Blood Glucose.: 202 mg/dL (05-08-25 @ 04:54)  POCT Blood Glucose.: 143 mg/dL (05-07-25 @ 21:31)  POCT Blood Glucose.: 145 mg/dL (05-07-25 @ 20:29)  POCT Blood Glucose.: 174 mg/dL (05-07-25 @ 16:51)  POCT Blood Glucose.: 374 mg/dL (05-07-25 @ 11:32)                            11.2   8.83  )-----------( 250      ( 08 May 2025 05:56 )             35.3       05-08    141  |  107  |  10  ----------------------------<  228[H]  3.5   |  22  |  0.53    eGFR: 96    Ca    8.7      05-08    TPro  6.3  /  Alb  4.7  /  TBili  0.2  /  DBili  x   /  AST  16  /  ALT  15  /  AlkPhos  80  05-08      Thyroid Function Tests:  04-18 @ 10:27 TSH 0.62 FreeT4 1.4 T3 -- Anti TPO -- Anti Thyroglobulin Ab -- TSI --      A1C with Estimated Average Glucose Result: 8.5 % (05-07-25 @ 13:48)  A1C with Estimated Average Glucose Result: 8.0 % (04-18-25 @ 10:27)  A1C with Estimated Average Glucose Result: 7.7 % (02-21-25 @ 08:41)  A1C with Estimated Average Glucose Result: 7.8 % (01-09-25 @ 05:16)  A1C with Estimated Average Glucose Result: 8.0 % (10-10-24 @ 09:19)      04-18 Chol 124 Direct LDL -- LDL calculated 56 HDL 54 Trig 67    Radiology:   ------------------------

## 2025-05-08 NOTE — ED CDU PROVIDER SUBSEQUENT DAY NOTE - ATTENDING APP SHARED VISIT CONTRIBUTION OF CARE
Attending MD Juan:   I personally have seen and examined this patient.  Physician assistant note reviewed and agree on plan of care and except where noted.  See below for details.     Seen in [place] CDU 59    74F with PMH/PSH including extensive medical history including (from EMR)  epilepsy on Keppra, COPD, asthma  (on CPAP and VATS at home, on chronic steroids), DM2, bronchiectasis, tracheomalacia s/p tracheloplasty, HTN, Hx of dissection of descending thoracic aorta, hx of aortic aneurysm, Type B dissection (7/2024), medically managed initially as she did not meet criteria for surgery at that time), evaluated by Dr. Antonio, Type A dissection s/p bioAVR with Bental procedure (9/2022), severe AS s/p TAVR (9/10/2023), TIA's, DVT, Afib on Eliquis,  Colon cancer S/P resection, colostomy bag, neuropathy, recent hospitalization for hallucinations, falls sent to the CDU after presenting to the ED with hyperglycemia.  Patient denies new physical complaints, reports knows waiting for endo.    Exam:   General: NAD  HENT: head NCAT, airway patent   Chest: symmetric chest rise, no increased work of breathing, +murmur  MSK: ranging neck freely  Neuro: moving all extremities spontaneously, sensory grossly intact, no gross neuro deficits  Psych: normal mood and affect     A/P: 74F with hyperglycemia, pending endo, will await

## 2025-05-08 NOTE — ED CDU PROVIDER DISPOSITION NOTE - CLINICAL COURSE
73 yo Fwith a PMH of Epilepsy on Keppra, COPD, asthma  (on CPAP and VATS at home, on chronic steroids), DM2, bronchiectasis, tracheomalacia s/p tracheloplasty, HTN, Hx of dissection of descending thoracic aorta, hx of aortic aneurysm, Type B dissection (7/2024), medically managed initially as she did not meet criteria for surgery at that time), evaluated by Dr. Antonio, Type A dissection s/p bioAVR with Bental procedure (9/2022), severe AS s/p TAVR (9/10/2023), TIA's, DVT, Afib on Eliquis,  Colon cancer S/P resection, colostomy bag, neuropathy, recent hospitalization for hallucinations, falls presents with hyperglycemia x several days. Spoke with daughter, Zoe, states while pt was admitted she was receiving Lantus BID and Novolog TID before meals, was transitioned to Januvia and Lantus qhs only at discharge. Daughter concerned she does not have enough insulin coverage. Pt otherwise w/o complaints. Compliant with all medications.    	In the ED VSS.  Labs initially remarkable for blood glucose of 374, now 174 s/p IV fluids.  Patient feeling well.  Plan to observe in the CDU overnight and endocrinology evaluation in the a.m. 73 yo Fwith a PMH of Epilepsy on Keppra, COPD, asthma  (on CPAP and VATS at home, on chronic steroids), DM2, bronchiectasis, tracheomalacia s/p tracheloplasty, HTN, Hx of dissection of descending thoracic aorta, hx of aortic aneurysm, Type B dissection (7/2024), medically managed initially as she did not meet criteria for surgery at that time), evaluated by Dr. Antonio, Type A dissection s/p bioAVR with Bental procedure (9/2022), severe AS s/p TAVR (9/10/2023), TIA's, DVT, Afib on Eliquis,  Colon cancer S/P resection, colostomy bag, neuropathy, recent hospitalization for hallucinations, falls presents with hyperglycemia x several days. Spoke with daughter, Zoe, states while pt was admitted she was receiving Lantus BID and Novolog TID before meals, was transitioned to Januvia and Lantus qhs only at discharge. Daughter concerned she does not have enough insulin coverage. Pt otherwise w/o complaints. Compliant with all medications.    	In the ED VSS.  Labs initially remarkable for blood glucose of 374, now 174 s/p IV fluids.  Patient feeling well.  Plan to observe in the CDU overnight and endocrinology evaluation in the a.m.  Patient seen by Endo in CDU and recommendations for discharge given. Pt feeling well throughout day. Stable for d/c, d/w patient's daughter.

## 2025-05-08 NOTE — ED CDU PROVIDER DISPOSITION NOTE - ATTENDING APP SHARED VISIT CONTRIBUTION OF CARE
Attending MD Juan:   I personally have seen and examined this patient.  Physician assistant note reviewed and agree on plan of care and except where noted.  See below for details.       Seen in [place] CDU 59    74F with PMH/PSH including extensive medical history including (from EMR)  epilepsy on Keppra, COPD, asthma  (on CPAP and VATS at home, on chronic steroids), DM2, bronchiectasis, tracheomalacia s/p tracheloplasty, HTN, Hx of dissection of descending thoracic aorta, hx of aortic aneurysm, Type B dissection (7/2024), medically managed initially as she did not meet criteria for surgery at that time), evaluated by Dr. Antonio, Type A dissection s/p bioAVR with Bental procedure (9/2022), severe AS s/p TAVR (9/10/2023), TIA's, DVT, Afib on Eliquis,  Colon cancer S/P resection, colostomy bag, neuropathy, recent hospitalization for hallucinations, falls sent to the CDU after presenting to the ED with hyperglycemia.  Patient denies new physical complaints, reports knows waiting for endo.    Exam:   General: NAD  HENT: head NCAT, airway patent   Chest: symmetric chest rise, no increased work of breathing, +murmur  MSK: ranging neck freely  Neuro: moving all extremities spontaneously, sensory grossly intact, no gross neuro deficits  Psych: normal mood and affect     A/P: 74F with hyperglycemia, seen by endo, recommendations reviewed, stable for discharge.  Follow up instructions given and return to ED parameters reviewed by CDU staff.  All questions answered, all concerns addressed.

## 2025-05-08 NOTE — CONSULT NOTE ADULT - ASSESSMENT
The patient is a 74yFemale with PMH of Epilepsy on Keppra, COPD, asthma  (on CPAP and VATS at home, on chronic steroids), DM2, bronchiectasis, tracheomalacia s/p tracheloplasty, HTN, Hx of dissection of descending thoracic aorta, hx of aortic aneurysm, Type B dissection (7/2024), medically managed initially as she did not meet criteria for surgery at that time), evaluated by Dr. Antonio, Type A dissection s/p bioAVR with Bental procedure (9/2022), severe AS s/p TAVR (9/10/2023), TIA's, DVT, Afib on Eliquis,  Colon cancer S/P resection, colostomy bag, neuropathy, recent hospitalization for hallucinations, falls presents with hyperglycemia x several days.    1. Type 2 DM  A1C:A1C with Estimated Average Glucose Result: 8.5 % (05-07-25 @ 13:48)  A1C with Estimated Average Glucose Result: 8.0 % (04-18-25 @ 10:27)  Home regimen: Lantus 12 units in the morning Januvia 100mg once daily   Endocrinologist: Dr. Moreno Panchal  EGFR: eGFR: 96 mL/min/1.73m2 (05-08-25 @ 05:56)  Discussed with patient the importance of Carb consistent diet and exercise as tolerated.    Recommend nutritional consultation  Discussed glycemic goal of a1c <7% to prevent microvascular complications of diabetes mellitus and reduce the risk of macrovascular complications.   Recommend annual podiatry and ophthalmology follow up.   Glucose goal of 80-180mg/dl while in patient.   Recommend Lantus  12 units at bedtime  Recommend Admelog 4 units tid with meals  Recommend LDSS (1:50 above 150mg/dl) and night time low does sliding scale for hyperglycemia corrections    Discharge recommendation/considerations:  Lantus 12 units once daily   Novolog 4 units TID with meals  Follow up with Dr. Panchal outpatient    PLAN DISCUSSED WITH PRIMARY TEAM VIA [employer]/PHONE/EMAIL      2. HTN  BP goal <130/80  Continue with her home meds  NIFEdipine XL 60 milliGRAM(s) Oral daily    3. HLD  LDL goal <70 mg/dl  Continue with statin therapy   rosuvastatin 5 milliGRAM(s) Oral at bedtime The patient is a 74yFemale with PMH of Epilepsy on Keppra, COPD, asthma  (on CPAP and VATS at home, on chronic steroids), DM2, bronchiectasis, tracheomalacia s/p tracheloplasty, HTN, Hx of dissection of descending thoracic aorta, hx of aortic aneurysm, Type B dissection (7/2024), medically managed initially as she did not meet criteria for surgery at that time), evaluated by Dr. Antonio, Type A dissection s/p bioAVR with Bental procedure (9/2022), severe AS s/p TAVR (9/10/2023), TIA's, DVT, Afib on Eliquis,  Colon cancer S/P resection, colostomy bag, neuropathy, recent hospitalization for hallucinations, falls presents with hyperglycemia x several days.    1. Type 2 DM  A1C:A1C with Estimated Average Glucose Result: 8.5 % (05-07-25 @ 13:48)  A1C with Estimated Average Glucose Result: 8.0 % (04-18-25 @ 10:27)  Home regimen: Lantus 12 units in the morning Januvia 100mg once daily   Endocrinologist: Dr. Moreno Panchal  EGFR: eGFR: 96 mL/min/1.73m2 (05-08-25 @ 05:56)  Discussed with patient the importance of Carb consistent diet and exercise as tolerated.    Recommend nutritional consultation  Discussed glycemic goal of a1c <7% to prevent microvascular complications of diabetes mellitus and reduce the risk of macrovascular complications.   Recommend annual podiatry and ophthalmology follow up.   Glucose goal of 80-180mg/dl while in patient.   Recommend Lantus  12 units at bedtime  Recommend Admelog 4 units tid with meals  Recommend LDSS (1:50 above 150mg/dl) and night time low does sliding scale for hyperglycemia corrections    Discharge recommendation/considerations:  Lantus 12 units once daily   Novolog 4 units TID with meals  Follow up with Dr. Panchal outpatient    PLAN DISCUSSED WITH PRIMARY TEAM VIA [employer]/PHONE/EMAIL  Plan also discussed with daughter Zoe on the phone    2. HTN  BP goal <130/80  Continue with her home meds  NIFEdipine XL 60 milliGRAM(s) Oral daily    3. HLD  LDL goal <70 mg/dl  Continue with statin therapy   rosuvastatin 5 milliGRAM(s) Oral at bedtime

## 2025-05-12 ENCOUNTER — APPOINTMENT (OUTPATIENT)
Dept: INFECTIOUS DISEASE | Facility: CLINIC | Age: 75
End: 2025-05-12
Payer: MEDICARE

## 2025-05-12 VITALS
WEIGHT: 142 LBS | TEMPERATURE: 97.8 F | BODY MASS INDEX: 22.29 KG/M2 | HEIGHT: 67 IN | DIASTOLIC BLOOD PRESSURE: 71 MMHG | HEART RATE: 51 BPM | SYSTOLIC BLOOD PRESSURE: 119 MMHG | OXYGEN SATURATION: 98 %

## 2025-05-12 PROCEDURE — G2211 COMPLEX E/M VISIT ADD ON: CPT

## 2025-05-12 PROCEDURE — 99213 OFFICE O/P EST LOW 20 MIN: CPT

## 2025-05-13 ENCOUNTER — APPOINTMENT (OUTPATIENT)
Dept: PLASTIC SURGERY | Facility: CLINIC | Age: 75
End: 2025-05-13

## 2025-05-14 ENCOUNTER — APPOINTMENT (OUTPATIENT)
Dept: ENDOCRINOLOGY | Facility: CLINIC | Age: 75
End: 2025-05-14
Payer: MEDICARE

## 2025-05-14 VITALS
OXYGEN SATURATION: 95 % | SYSTOLIC BLOOD PRESSURE: 130 MMHG | WEIGHT: 145 LBS | TEMPERATURE: 98.3 F | DIASTOLIC BLOOD PRESSURE: 80 MMHG | HEIGHT: 67 IN | HEART RATE: 55 BPM | BODY MASS INDEX: 22.76 KG/M2

## 2025-05-14 DIAGNOSIS — E11.9 TYPE 2 DIABETES MELLITUS W/OUT COMPLICATIONS: ICD-10-CM

## 2025-05-14 DIAGNOSIS — R53.83 OTHER FATIGUE: ICD-10-CM

## 2025-05-14 DIAGNOSIS — I10 ESSENTIAL (PRIMARY) HYPERTENSION: ICD-10-CM

## 2025-05-14 DIAGNOSIS — E55.9 VITAMIN D DEFICIENCY, UNSPECIFIED: ICD-10-CM

## 2025-05-14 DIAGNOSIS — E78.5 HYPERLIPIDEMIA, UNSPECIFIED: ICD-10-CM

## 2025-05-14 PROCEDURE — 82962 GLUCOSE BLOOD TEST: CPT

## 2025-05-14 PROCEDURE — 99214 OFFICE O/P EST MOD 30 MIN: CPT

## 2025-05-14 PROCEDURE — G2211 COMPLEX E/M VISIT ADD ON: CPT

## 2025-05-15 LAB — GLUCOSE BLDC GLUCOMTR-MCNC: 265

## 2025-05-15 NOTE — ED ADULT NURSE NOTE - BREATHING
spontaneous Instructions: This plan will send the code FBSE to the PM system.  DO NOT or CHANGE the price. Detail Level: Simple Price (Do Not Change): 0.00

## 2025-05-21 ENCOUNTER — APPOINTMENT (OUTPATIENT)
Dept: OTOLARYNGOLOGY | Facility: CLINIC | Age: 75
End: 2025-05-21

## 2025-05-21 VITALS
SYSTOLIC BLOOD PRESSURE: 111 MMHG | HEIGHT: 67.5 IN | DIASTOLIC BLOOD PRESSURE: 65 MMHG | BODY MASS INDEX: 22.49 KG/M2 | HEART RATE: 52 BPM | WEIGHT: 145 LBS

## 2025-05-21 PROCEDURE — 99214 OFFICE O/P EST MOD 30 MIN: CPT

## 2025-05-27 ENCOUNTER — APPOINTMENT (OUTPATIENT)
Dept: INTERNAL MEDICINE | Facility: CLINIC | Age: 75
End: 2025-05-27

## 2025-05-27 ENCOUNTER — OUTPATIENT (OUTPATIENT)
Dept: OUTPATIENT SERVICES | Facility: HOSPITAL | Age: 75
LOS: 1 days | End: 2025-05-27

## 2025-05-27 VITALS
BODY MASS INDEX: 22.49 KG/M2 | WEIGHT: 145 LBS | DIASTOLIC BLOOD PRESSURE: 60 MMHG | SYSTOLIC BLOOD PRESSURE: 130 MMHG | OXYGEN SATURATION: 94 % | HEART RATE: 50 BPM | HEIGHT: 67.5 IN

## 2025-05-27 DIAGNOSIS — Z98.890 OTHER SPECIFIED POSTPROCEDURAL STATES: Chronic | ICD-10-CM

## 2025-05-27 DIAGNOSIS — I10 ESSENTIAL (PRIMARY) HYPERTENSION: ICD-10-CM

## 2025-05-27 DIAGNOSIS — Z95.2 PRESENCE OF PROSTHETIC HEART VALVE: Chronic | ICD-10-CM

## 2025-05-27 DIAGNOSIS — Z98.89 OTHER SPECIFIED POSTPROCEDURAL STATES: Chronic | ICD-10-CM

## 2025-05-27 DIAGNOSIS — H05.352 EXOSTOSIS OF LEFT ORBIT: Chronic | ICD-10-CM

## 2025-05-27 DIAGNOSIS — Z87.09 PERSONAL HISTORY OF OTHER DISEASES OF THE RESPIRATORY SYSTEM: Chronic | ICD-10-CM

## 2025-05-27 DIAGNOSIS — Z96.653 PRESENCE OF ARTIFICIAL KNEE JOINT, BILATERAL: Chronic | ICD-10-CM

## 2025-05-27 DIAGNOSIS — Z93.3 COLOSTOMY STATUS: Chronic | ICD-10-CM

## 2025-05-27 PROCEDURE — G0463: CPT

## 2025-05-27 PROCEDURE — G2211 COMPLEX E/M VISIT ADD ON: CPT

## 2025-05-27 PROCEDURE — 99214 OFFICE O/P EST MOD 30 MIN: CPT

## 2025-05-30 ENCOUNTER — APPOINTMENT (OUTPATIENT)
Dept: PULMONOLOGY | Facility: CLINIC | Age: 75
End: 2025-05-30
Payer: MEDICARE

## 2025-05-30 VITALS
HEIGHT: 67.5 IN | BODY MASS INDEX: 22.49 KG/M2 | WEIGHT: 145 LBS | OXYGEN SATURATION: 96 % | RESPIRATION RATE: 18 BRPM | SYSTOLIC BLOOD PRESSURE: 116 MMHG | HEART RATE: 50 BPM | DIASTOLIC BLOOD PRESSURE: 50 MMHG | TEMPERATURE: 97.3 F

## 2025-05-30 PROCEDURE — 96372 THER/PROPH/DIAG INJ SC/IM: CPT

## 2025-05-30 RX ORDER — TEZEPELUMAB-EKKO 210 MG/1.9ML
210 INJECTION, SOLUTION SUBCUTANEOUS
Qty: 0 | Refills: 0 | Status: COMPLETED | OUTPATIENT
Start: 2025-05-30

## 2025-06-02 ENCOUNTER — APPOINTMENT (OUTPATIENT)
Dept: HOME HEALTH SERVICES | Facility: HOME HEALTH | Age: 75
End: 2025-06-02
Payer: MEDICARE

## 2025-06-02 VITALS
RESPIRATION RATE: 18 BRPM | HEART RATE: 51 BPM | BODY MASS INDEX: 24.05 KG/M2 | HEIGHT: 67.5 IN | SYSTOLIC BLOOD PRESSURE: 130 MMHG | TEMPERATURE: 97.3 F | OXYGEN SATURATION: 96 % | WEIGHT: 155 LBS | DIASTOLIC BLOOD PRESSURE: 60 MMHG

## 2025-06-02 DIAGNOSIS — E78.5 HYPERLIPIDEMIA, UNSPECIFIED: ICD-10-CM

## 2025-06-02 DIAGNOSIS — D84.9 IMMUNODEFICIENCY, UNSPECIFIED: ICD-10-CM

## 2025-06-02 DIAGNOSIS — F41.9 ANXIETY DISORDER, UNSPECIFIED: ICD-10-CM

## 2025-06-02 DIAGNOSIS — I48.91 UNSPECIFIED ATRIAL FIBRILLATION: ICD-10-CM

## 2025-06-02 DIAGNOSIS — C21.0 MALIGNANT NEOPLASM OF ANUS, UNSPECIFIED: ICD-10-CM

## 2025-06-02 DIAGNOSIS — I35.1 NONRHEUMATIC AORTIC (VALVE) INSUFFICIENCY: ICD-10-CM

## 2025-06-02 DIAGNOSIS — G40.909 EPILEPSY, UNSPECIFIED, NOT INTRACTABLE, W/OUT STATUS EPILEPTICUS: ICD-10-CM

## 2025-06-02 DIAGNOSIS — Z71.89 OTHER SPECIFIED COUNSELING: ICD-10-CM

## 2025-06-02 DIAGNOSIS — I10 ESSENTIAL (PRIMARY) HYPERTENSION: ICD-10-CM

## 2025-06-02 DIAGNOSIS — G47.33 OBSTRUCTIVE SLEEP APNEA (ADULT) (PEDIATRIC): ICD-10-CM

## 2025-06-02 DIAGNOSIS — C90.00 MULTIPLE MYELOMA NOT HAVING ACHIEVED REMISSION: ICD-10-CM

## 2025-06-02 DIAGNOSIS — I44.0 ATRIOVENTRICULAR BLOCK, FIRST DEGREE: ICD-10-CM

## 2025-06-02 DIAGNOSIS — I71.9 AORTIC ANEURYSM OF UNSPECIFIED SITE, W/OUT RUPTURE: ICD-10-CM

## 2025-06-02 DIAGNOSIS — I71.012 DISSECTION OF DESCENDING THORACIC AORTA: ICD-10-CM

## 2025-06-02 PROCEDURE — 86850 RBC ANTIBODY SCREEN: CPT

## 2025-06-02 PROCEDURE — 84100 ASSAY OF PHOSPHORUS: CPT

## 2025-06-02 PROCEDURE — 87205 SMEAR GRAM STAIN: CPT

## 2025-06-02 PROCEDURE — 83690 ASSAY OF LIPASE: CPT

## 2025-06-02 PROCEDURE — 94640 AIRWAY INHALATION TREATMENT: CPT

## 2025-06-02 PROCEDURE — 96374 THER/PROPH/DIAG INJ IV PUSH: CPT

## 2025-06-02 PROCEDURE — 84132 ASSAY OF SERUM POTASSIUM: CPT

## 2025-06-02 PROCEDURE — 87640 STAPH A DNA AMP PROBE: CPT

## 2025-06-02 PROCEDURE — 82330 ASSAY OF CALCIUM: CPT

## 2025-06-02 PROCEDURE — 83880 ASSAY OF NATRIURETIC PEPTIDE: CPT

## 2025-06-02 PROCEDURE — 87040 BLOOD CULTURE FOR BACTERIA: CPT

## 2025-06-02 PROCEDURE — 70486 CT MAXILLOFACIAL W/O DYE: CPT | Mod: MC

## 2025-06-02 PROCEDURE — 85018 HEMOGLOBIN: CPT

## 2025-06-02 PROCEDURE — 85027 COMPLETE CBC AUTOMATED: CPT

## 2025-06-02 PROCEDURE — 97166 OT EVAL MOD COMPLEX 45 MIN: CPT

## 2025-06-02 PROCEDURE — 82962 GLUCOSE BLOOD TEST: CPT

## 2025-06-02 PROCEDURE — 85014 HEMATOCRIT: CPT

## 2025-06-02 PROCEDURE — 82947 ASSAY GLUCOSE BLOOD QUANT: CPT

## 2025-06-02 PROCEDURE — 83036 HEMOGLOBIN GLYCOSYLATED A1C: CPT

## 2025-06-02 PROCEDURE — 76377 3D RENDER W/INTRP POSTPROCES: CPT

## 2025-06-02 PROCEDURE — 87086 URINE CULTURE/COLONY COUNT: CPT

## 2025-06-02 PROCEDURE — 93005 ELECTROCARDIOGRAM TRACING: CPT

## 2025-06-02 PROCEDURE — 86900 BLOOD TYPING SEROLOGIC ABO: CPT

## 2025-06-02 PROCEDURE — 87637 SARSCOV2&INF A&B&RSV AMP PRB: CPT

## 2025-06-02 PROCEDURE — 83735 ASSAY OF MAGNESIUM: CPT

## 2025-06-02 PROCEDURE — 84484 ASSAY OF TROPONIN QUANT: CPT

## 2025-06-02 PROCEDURE — 85025 COMPLETE CBC W/AUTO DIFF WBC: CPT

## 2025-06-02 PROCEDURE — 84145 PROCALCITONIN (PCT): CPT

## 2025-06-02 PROCEDURE — 80053 COMPREHEN METABOLIC PANEL: CPT

## 2025-06-02 PROCEDURE — 82803 BLOOD GASES ANY COMBINATION: CPT

## 2025-06-02 PROCEDURE — 80061 LIPID PANEL: CPT

## 2025-06-02 PROCEDURE — 84295 ASSAY OF SERUM SODIUM: CPT

## 2025-06-02 PROCEDURE — 99345 HOME/RES VST NEW HIGH MDM 75: CPT | Mod: 25,2W

## 2025-06-02 PROCEDURE — 85730 THROMBOPLASTIN TIME PARTIAL: CPT

## 2025-06-02 PROCEDURE — 70450 CT HEAD/BRAIN W/O DYE: CPT | Mod: MC

## 2025-06-02 PROCEDURE — 71045 X-RAY EXAM CHEST 1 VIEW: CPT

## 2025-06-02 PROCEDURE — 99285 EMERGENCY DEPT VISIT HI MDM: CPT

## 2025-06-02 PROCEDURE — 93308 TTE F-UP OR LMTD: CPT

## 2025-06-02 PROCEDURE — 86901 BLOOD TYPING SEROLOGIC RH(D): CPT

## 2025-06-02 PROCEDURE — 84443 ASSAY THYROID STIM HORMONE: CPT

## 2025-06-02 PROCEDURE — 36415 COLL VENOUS BLD VENIPUNCTURE: CPT

## 2025-06-02 PROCEDURE — 83605 ASSAY OF LACTIC ACID: CPT

## 2025-06-02 PROCEDURE — 97116 GAIT TRAINING THERAPY: CPT

## 2025-06-02 PROCEDURE — 87641 MR-STAPH DNA AMP PROBE: CPT

## 2025-06-02 PROCEDURE — 70551 MRI BRAIN STEM W/O DYE: CPT | Mod: MC

## 2025-06-02 PROCEDURE — G0506: CPT | Mod: 2W

## 2025-06-02 PROCEDURE — 71250 CT THORAX DX C-: CPT | Mod: MC

## 2025-06-02 PROCEDURE — 85610 PROTHROMBIN TIME: CPT

## 2025-06-02 PROCEDURE — 72192 CT PELVIS W/O DYE: CPT | Mod: MC

## 2025-06-02 PROCEDURE — 97162 PT EVAL MOD COMPLEX 30 MIN: CPT

## 2025-06-02 PROCEDURE — 81001 URINALYSIS AUTO W/SCOPE: CPT

## 2025-06-02 PROCEDURE — 0225U NFCT DS DNA&RNA 21 SARSCOV2: CPT

## 2025-06-02 PROCEDURE — 97530 THERAPEUTIC ACTIVITIES: CPT

## 2025-06-02 PROCEDURE — 84439 ASSAY OF FREE THYROXINE: CPT

## 2025-06-02 PROCEDURE — 82435 ASSAY OF BLOOD CHLORIDE: CPT

## 2025-06-02 PROCEDURE — 82553 CREATINE MB FRACTION: CPT

## 2025-06-02 PROCEDURE — 73110 X-RAY EXAM OF WRIST: CPT

## 2025-06-02 PROCEDURE — 87070 CULTURE OTHR SPECIMN AEROBIC: CPT

## 2025-06-02 PROCEDURE — 36569 INSJ PICC 5 YR+ W/O IMAGING: CPT

## 2025-06-02 PROCEDURE — 80048 BASIC METABOLIC PNL TOTAL CA: CPT

## 2025-06-02 PROCEDURE — 72125 CT NECK SPINE W/O DYE: CPT | Mod: MC

## 2025-06-02 PROCEDURE — C1751: CPT

## 2025-06-02 RX ORDER — ROSUVASTATIN CALCIUM 5 MG/1
5 TABLET, FILM COATED ORAL
Qty: 90 | Refills: 1 | Status: ACTIVE | COMMUNITY
Start: 2025-06-02

## 2025-06-04 PROBLEM — R09.3 ABNORMAL SPUTUM: Status: ACTIVE | Noted: 2017-07-24

## 2025-06-04 LAB — BACTERIA SPT CULT: ABNORMAL

## 2025-06-04 RX ORDER — SULFAMETHOXAZOLE AND TRIMETHOPRIM 800; 160 MG/1; MG/1
800-160 TABLET ORAL
Qty: 20 | Refills: 0 | Status: ACTIVE | COMMUNITY
Start: 2025-06-04 | End: 1900-01-01

## 2025-06-05 ENCOUNTER — NON-APPOINTMENT (OUTPATIENT)
Age: 75
End: 2025-06-05

## 2025-06-05 ENCOUNTER — TRANSCRIPTION ENCOUNTER (OUTPATIENT)
Age: 75
End: 2025-06-05

## 2025-06-06 ENCOUNTER — APPOINTMENT (OUTPATIENT)
Dept: PULMONOLOGY | Facility: CLINIC | Age: 75
End: 2025-06-06
Payer: MEDICARE

## 2025-06-06 VITALS
RESPIRATION RATE: 17 BRPM | HEIGHT: 67 IN | OXYGEN SATURATION: 97 % | WEIGHT: 155 LBS | BODY MASS INDEX: 24.33 KG/M2 | HEART RATE: 43 BPM | TEMPERATURE: 97.2 F | DIASTOLIC BLOOD PRESSURE: 70 MMHG | SYSTOLIC BLOOD PRESSURE: 118 MMHG

## 2025-06-06 PROCEDURE — 94729 DIFFUSING CAPACITY: CPT

## 2025-06-06 PROCEDURE — 94010 BREATHING CAPACITY TEST: CPT

## 2025-06-06 PROCEDURE — 95012 NITRIC OXIDE EXP GAS DETER: CPT | Mod: PD

## 2025-06-06 PROCEDURE — 94727 GAS DIL/WSHOT DETER LNG VOL: CPT

## 2025-06-06 PROCEDURE — 99214 OFFICE O/P EST MOD 30 MIN: CPT | Mod: 25

## 2025-06-09 ENCOUNTER — INPATIENT (INPATIENT)
Facility: HOSPITAL | Age: 75
LOS: 11 days | Discharge: HOME HEALTH | DRG: 204 | End: 2025-06-21
Attending: INTERNAL MEDICINE | Admitting: STUDENT IN AN ORGANIZED HEALTH CARE EDUCATION/TRAINING PROGRAM
Payer: MEDICARE

## 2025-06-09 ENCOUNTER — TRANSCRIPTION ENCOUNTER (OUTPATIENT)
Age: 75
End: 2025-06-09

## 2025-06-09 ENCOUNTER — NON-APPOINTMENT (OUTPATIENT)
Age: 75
End: 2025-06-09

## 2025-06-09 VITALS
OXYGEN SATURATION: 98 % | RESPIRATION RATE: 22 BRPM | WEIGHT: 154.98 LBS | TEMPERATURE: 98 F | HEART RATE: 55 BPM | HEIGHT: 60.7 IN | DIASTOLIC BLOOD PRESSURE: 88 MMHG | SYSTOLIC BLOOD PRESSURE: 155 MMHG

## 2025-06-09 DIAGNOSIS — Z93.3 COLOSTOMY STATUS: Chronic | ICD-10-CM

## 2025-06-09 DIAGNOSIS — Z87.09 PERSONAL HISTORY OF OTHER DISEASES OF THE RESPIRATORY SYSTEM: Chronic | ICD-10-CM

## 2025-06-09 DIAGNOSIS — Z95.2 PRESENCE OF PROSTHETIC HEART VALVE: Chronic | ICD-10-CM

## 2025-06-09 DIAGNOSIS — Z98.89 OTHER SPECIFIED POSTPROCEDURAL STATES: Chronic | ICD-10-CM

## 2025-06-09 DIAGNOSIS — G40.909 EPILEPSY, UNSPECIFIED, NOT INTRACTABLE, WITHOUT STATUS EPILEPTICUS: ICD-10-CM

## 2025-06-09 DIAGNOSIS — A41.9 SEPSIS, UNSPECIFIED ORGANISM: ICD-10-CM

## 2025-06-09 DIAGNOSIS — I10 ESSENTIAL (PRIMARY) HYPERTENSION: ICD-10-CM

## 2025-06-09 DIAGNOSIS — J44.89 OTHER SPECIFIED CHRONIC OBSTRUCTIVE PULMONARY DISEASE: ICD-10-CM

## 2025-06-09 DIAGNOSIS — Z98.890 OTHER SPECIFIED POSTPROCEDURAL STATES: Chronic | ICD-10-CM

## 2025-06-09 DIAGNOSIS — K62.5 HEMORRHAGE OF ANUS AND RECTUM: Chronic | ICD-10-CM

## 2025-06-09 DIAGNOSIS — E11.9 TYPE 2 DIABETES MELLITUS WITHOUT COMPLICATIONS: ICD-10-CM

## 2025-06-09 DIAGNOSIS — R05.9 COUGH, UNSPECIFIED: ICD-10-CM

## 2025-06-09 DIAGNOSIS — H05.352 EXOSTOSIS OF LEFT ORBIT: Chronic | ICD-10-CM

## 2025-06-09 DIAGNOSIS — I48.20 CHRONIC ATRIAL FIBRILLATION, UNSPECIFIED: ICD-10-CM

## 2025-06-09 DIAGNOSIS — Z96.653 PRESENCE OF ARTIFICIAL KNEE JOINT, BILATERAL: Chronic | ICD-10-CM

## 2025-06-09 LAB
ADD ON TEST-SPECIMEN IN LAB: SIGNIFICANT CHANGE UP
ALBUMIN SERPL ELPH-MCNC: 4 G/DL — SIGNIFICANT CHANGE UP (ref 3.3–5)
ALP SERPL-CCNC: 69 U/L — SIGNIFICANT CHANGE UP (ref 40–120)
ALT FLD-CCNC: 23 U/L — SIGNIFICANT CHANGE UP (ref 10–45)
ANION GAP SERPL CALC-SCNC: 17 MMOL/L — SIGNIFICANT CHANGE UP (ref 5–17)
ANISOCYTOSIS BLD QL: SIGNIFICANT CHANGE UP
APPEARANCE UR: CLEAR — SIGNIFICANT CHANGE UP
AST SERPL-CCNC: 29 U/L — SIGNIFICANT CHANGE UP (ref 10–40)
BACTERIA # UR AUTO: NEGATIVE /HPF — SIGNIFICANT CHANGE UP
BASOPHILS # BLD AUTO: 0 K/UL — SIGNIFICANT CHANGE UP (ref 0–0.2)
BASOPHILS NFR BLD AUTO: 0 % — SIGNIFICANT CHANGE UP (ref 0–2)
BILIRUB SERPL-MCNC: 0.3 MG/DL — SIGNIFICANT CHANGE UP (ref 0.2–1.2)
BILIRUB UR-MCNC: NEGATIVE — SIGNIFICANT CHANGE UP
BUN SERPL-MCNC: 26 MG/DL — HIGH (ref 7–23)
BURR CELLS BLD QL SMEAR: PRESENT — SIGNIFICANT CHANGE UP
CALCIUM SERPL-MCNC: 9.1 MG/DL — SIGNIFICANT CHANGE UP (ref 8.4–10.5)
CAST: 0 /LPF — SIGNIFICANT CHANGE UP (ref 0–4)
CHLORIDE SERPL-SCNC: 105 MMOL/L — SIGNIFICANT CHANGE UP (ref 96–108)
CO2 SERPL-SCNC: 16 MMOL/L — LOW (ref 22–31)
COLOR SPEC: YELLOW — SIGNIFICANT CHANGE UP
CREAT SERPL-MCNC: 0.9 MG/DL — SIGNIFICANT CHANGE UP (ref 0.5–1.3)
DIFF PNL FLD: NEGATIVE — SIGNIFICANT CHANGE UP
EGFR: 66 ML/MIN/1.73M2 — SIGNIFICANT CHANGE UP
EGFR: 66 ML/MIN/1.73M2 — SIGNIFICANT CHANGE UP
EOSINOPHIL # BLD AUTO: 0 K/UL — SIGNIFICANT CHANGE UP (ref 0–0.5)
EOSINOPHIL NFR BLD AUTO: 0 % — SIGNIFICANT CHANGE UP (ref 0–6)
FLUAV AG NPH QL: SIGNIFICANT CHANGE UP
FLUBV AG NPH QL: SIGNIFICANT CHANGE UP
GAS PNL BLDV: SIGNIFICANT CHANGE UP
GLUCOSE BLDC GLUCOMTR-MCNC: 114 MG/DL — HIGH (ref 70–99)
GLUCOSE SERPL-MCNC: 65 MG/DL — LOW (ref 70–99)
GLUCOSE UR QL: NEGATIVE — SIGNIFICANT CHANGE UP
HCT VFR BLD CALC: 41 % — SIGNIFICANT CHANGE UP (ref 34.5–45)
HGB BLD-MCNC: 12.6 G/DL — SIGNIFICANT CHANGE UP (ref 11.5–15.5)
KETONES UR QL: NEGATIVE — SIGNIFICANT CHANGE UP
LACTATE BLDV-MCNC: 1.8 MMOL/L — SIGNIFICANT CHANGE UP (ref 0.5–2)
LEUKOCYTE ESTERASE UR-ACNC: NEGATIVE — SIGNIFICANT CHANGE UP
LYMPHOCYTES # BLD AUTO: 2.26 K/UL — SIGNIFICANT CHANGE UP (ref 1–3.3)
LYMPHOCYTES # BLD AUTO: 8.8 % — LOW (ref 13–44)
MACROCYTES BLD QL: SLIGHT — SIGNIFICANT CHANGE UP
MANUAL SMEAR VERIFICATION: SIGNIFICANT CHANGE UP
MCHC RBC-ENTMCNC: 24.1 PG — LOW (ref 27–34)
MCHC RBC-ENTMCNC: 30.7 G/DL — LOW (ref 32–36)
MCV RBC AUTO: 78.4 FL — LOW (ref 80–100)
MICROCYTES BLD QL: SIGNIFICANT CHANGE UP
MONOCYTES # BLD AUTO: 1.34 K/UL — HIGH (ref 0–0.9)
MONOCYTES NFR BLD AUTO: 5.2 % — SIGNIFICANT CHANGE UP (ref 2–14)
NEUTROPHILS # BLD AUTO: 22.13 K/UL — HIGH (ref 1.8–7.4)
NEUTROPHILS NFR BLD AUTO: 86 % — HIGH (ref 43–77)
NITRITE UR-MCNC: NEGATIVE — SIGNIFICANT CHANGE UP
NT-PROBNP SERPL-SCNC: 1109 PG/ML — HIGH (ref 0–300)
OVALOCYTES BLD QL SMEAR: SLIGHT — SIGNIFICANT CHANGE UP
PH UR: 7 — SIGNIFICANT CHANGE UP (ref 5–8)
PLAT MORPH BLD: NORMAL — SIGNIFICANT CHANGE UP
PLATELET # BLD AUTO: 206 K/UL — SIGNIFICANT CHANGE UP (ref 150–400)
POIKILOCYTOSIS BLD QL AUTO: SIGNIFICANT CHANGE UP
POTASSIUM SERPL-MCNC: 4.8 MMOL/L — SIGNIFICANT CHANGE UP (ref 3.5–5.3)
POTASSIUM SERPL-SCNC: 4.8 MMOL/L — SIGNIFICANT CHANGE UP (ref 3.5–5.3)
PROT SERPL-MCNC: 6.6 G/DL — SIGNIFICANT CHANGE UP (ref 6–8.3)
PROT UR-MCNC: NEGATIVE — SIGNIFICANT CHANGE UP
RBC # BLD: 5.23 M/UL — HIGH (ref 3.8–5.2)
RBC # FLD: 19 % — HIGH (ref 10.3–14.5)
RBC BLD AUTO: ABNORMAL
RBC CASTS # UR COMP ASSIST: 2 /HPF — SIGNIFICANT CHANGE UP (ref 0–4)
REVIEW: SIGNIFICANT CHANGE UP
RSV RNA NPH QL NAA+NON-PROBE: SIGNIFICANT CHANGE UP
SARS-COV-2 RNA SPEC QL NAA+PROBE: SIGNIFICANT CHANGE UP
SCHISTOCYTES BLD QL AUTO: SIGNIFICANT CHANGE UP
SODIUM SERPL-SCNC: 138 MMOL/L — SIGNIFICANT CHANGE UP (ref 135–145)
SOURCE RESPIRATORY: SIGNIFICANT CHANGE UP
SP GR SPEC: 1.01 — SIGNIFICANT CHANGE UP (ref 1–1.03)
SQUAMOUS # UR AUTO: 0 /HPF — SIGNIFICANT CHANGE UP (ref 0–5)
TARGETS BLD QL SMEAR: SLIGHT — SIGNIFICANT CHANGE UP
TROPONIN T, HIGH SENSITIVITY RESULT: 29 NG/L — SIGNIFICANT CHANGE UP (ref 0–51)
UROBILINOGEN FLD QL: SIGNIFICANT CHANGE UP
WBC # BLD: 25.73 K/UL — HIGH (ref 3.8–10.5)
WBC # FLD AUTO: 25.73 K/UL — HIGH (ref 3.8–10.5)
WBC UR QL: 1 /HPF — SIGNIFICANT CHANGE UP (ref 0–5)

## 2025-06-09 PROCEDURE — 99223 1ST HOSP IP/OBS HIGH 75: CPT

## 2025-06-09 PROCEDURE — G0545: CPT

## 2025-06-09 PROCEDURE — 99497 ADVNCD CARE PLAN 30 MIN: CPT | Mod: 25

## 2025-06-09 PROCEDURE — 36410 VNPNXR 3YR/> PHY/QHP DX/THER: CPT | Mod: GC

## 2025-06-09 PROCEDURE — 93010 ELECTROCARDIOGRAM REPORT: CPT

## 2025-06-09 PROCEDURE — 71045 X-RAY EXAM CHEST 1 VIEW: CPT | Mod: 26

## 2025-06-09 PROCEDURE — 99285 EMERGENCY DEPT VISIT HI MDM: CPT | Mod: GC

## 2025-06-09 PROCEDURE — 71250 CT THORAX DX C-: CPT | Mod: 26

## 2025-06-09 RX ORDER — ERTAPENEM SODIUM 1 G/1
1000 INJECTION, POWDER, LYOPHILIZED, FOR SOLUTION INTRAMUSCULAR; INTRAVENOUS ONCE
Refills: 0 | Status: COMPLETED | OUTPATIENT
Start: 2025-06-09 | End: 2025-06-09

## 2025-06-09 RX ORDER — ERTAPENEM SODIUM 1 G/1
1000 INJECTION, POWDER, LYOPHILIZED, FOR SOLUTION INTRAMUSCULAR; INTRAVENOUS EVERY 24 HOURS
Refills: 0 | Status: DISCONTINUED | OUTPATIENT
Start: 2025-06-10 | End: 2025-06-11

## 2025-06-09 RX ORDER — APIXABAN 2.5 MG/1
5 TABLET, FILM COATED ORAL EVERY 12 HOURS
Refills: 0 | Status: DISCONTINUED | OUTPATIENT
Start: 2025-06-09 | End: 2025-06-09

## 2025-06-09 RX ORDER — GLYCOPYRROLATE 0.2 MG/ML
1 INJECTION INTRAMUSCULAR; INTRAVENOUS THREE TIMES A DAY
Refills: 0 | Status: DISCONTINUED | OUTPATIENT
Start: 2025-06-09 | End: 2025-06-09

## 2025-06-09 RX ORDER — NIFEDIPINE 30 MG
60 TABLET, EXTENDED RELEASE 24 HR ORAL DAILY
Refills: 0 | Status: DISCONTINUED | OUTPATIENT
Start: 2025-06-09 | End: 2025-06-21

## 2025-06-09 RX ORDER — ALBUTEROL SULFATE 2.5 MG/3ML
2.5 VIAL, NEBULIZER (ML) INHALATION EVERY 6 HOURS
Refills: 0 | Status: DISCONTINUED | OUTPATIENT
Start: 2025-06-09 | End: 2025-06-21

## 2025-06-09 RX ORDER — VANCOMYCIN HCL IN 5 % DEXTROSE 1.5G/250ML
1000 PLASTIC BAG, INJECTION (ML) INTRAVENOUS EVERY 24 HOURS
Refills: 0 | Status: DISCONTINUED | OUTPATIENT
Start: 2025-06-10 | End: 2025-06-11

## 2025-06-09 RX ORDER — EMOLLIENT COMBINATION NO.35
0 CREAM (GRAM) TOPICAL
Refills: 0 | DISCHARGE

## 2025-06-09 RX ORDER — INSULIN GLARGINE-YFGN 100 [IU]/ML
28 INJECTION, SOLUTION SUBCUTANEOUS AT BEDTIME
Refills: 0 | Status: DISCONTINUED | OUTPATIENT
Start: 2025-06-09 | End: 2025-06-11

## 2025-06-09 RX ORDER — METHYLPREDNISOLONE ACETATE 80 MG/ML
80 INJECTION, SUSPENSION INTRA-ARTICULAR; INTRALESIONAL; INTRAMUSCULAR; SOFT TISSUE ONCE
Refills: 0 | Status: COMPLETED | OUTPATIENT
Start: 2025-06-09 | End: 2025-06-09

## 2025-06-09 RX ORDER — TIOTROPIUM BROMIDE INHALATION SPRAY 3.12 UG/1
2 SPRAY, METERED RESPIRATORY (INHALATION) DAILY
Refills: 0 | Status: DISCONTINUED | OUTPATIENT
Start: 2025-06-09 | End: 2025-06-21

## 2025-06-09 RX ORDER — LACOSAMIDE 150 MG/1
100 TABLET, FILM COATED ORAL
Refills: 0 | Status: DISCONTINUED | OUTPATIENT
Start: 2025-06-09 | End: 2025-06-21

## 2025-06-09 RX ORDER — SERTRALINE 100 MG/1
50 TABLET, FILM COATED ORAL DAILY
Refills: 0 | Status: DISCONTINUED | OUTPATIENT
Start: 2025-06-09 | End: 2025-06-21

## 2025-06-09 RX ORDER — APIXABAN 2.5 MG/1
1 TABLET, FILM COATED ORAL
Refills: 0 | DISCHARGE

## 2025-06-09 RX ORDER — BUDESONIDE 0.25 MG/2ML
0.5 SUSPENSION RESPIRATORY (INHALATION) ONCE
Refills: 0 | Status: COMPLETED | OUTPATIENT
Start: 2025-06-09 | End: 2025-06-09

## 2025-06-09 RX ORDER — METHYLPREDNISOLONE ACETATE 80 MG/ML
8 INJECTION, SUSPENSION INTRA-ARTICULAR; INTRALESIONAL; INTRAMUSCULAR; SOFT TISSUE DAILY
Refills: 0 | Status: DISCONTINUED | OUTPATIENT
Start: 2025-06-09 | End: 2025-06-09

## 2025-06-09 RX ORDER — SENNA 187 MG
2 TABLET ORAL AT BEDTIME
Refills: 0 | Status: DISCONTINUED | OUTPATIENT
Start: 2025-06-09 | End: 2025-06-21

## 2025-06-09 RX ORDER — DEXTROSE 50 % IN WATER 50 %
25 SYRINGE (ML) INTRAVENOUS ONCE
Refills: 0 | Status: DISCONTINUED | OUTPATIENT
Start: 2025-06-09 | End: 2025-06-21

## 2025-06-09 RX ORDER — MELATONIN 5 MG
3 TABLET ORAL AT BEDTIME
Refills: 0 | Status: DISCONTINUED | OUTPATIENT
Start: 2025-06-09 | End: 2025-06-21

## 2025-06-09 RX ORDER — SODIUM CHLORIDE 9 G/1000ML
1000 INJECTION, SOLUTION INTRAVENOUS
Refills: 0 | Status: DISCONTINUED | OUTPATIENT
Start: 2025-06-09 | End: 2025-06-21

## 2025-06-09 RX ORDER — LABETALOL HYDROCHLORIDE 200 MG/1
100 TABLET, FILM COATED ORAL EVERY 8 HOURS
Refills: 0 | Status: DISCONTINUED | OUTPATIENT
Start: 2025-06-09 | End: 2025-06-12

## 2025-06-09 RX ORDER — ONDANSETRON HCL/PF 4 MG/2 ML
4 VIAL (ML) INJECTION ONCE
Refills: 0 | Status: COMPLETED | OUTPATIENT
Start: 2025-06-09 | End: 2025-06-09

## 2025-06-09 RX ORDER — ACETAMINOPHEN 500 MG/5ML
1000 LIQUID (ML) ORAL ONCE
Refills: 0 | Status: COMPLETED | OUTPATIENT
Start: 2025-06-09 | End: 2025-06-09

## 2025-06-09 RX ORDER — ONDANSETRON HCL/PF 4 MG/2 ML
4 VIAL (ML) INJECTION EVERY 8 HOURS
Refills: 0 | Status: DISCONTINUED | OUTPATIENT
Start: 2025-06-09 | End: 2025-06-21

## 2025-06-09 RX ORDER — MONTELUKAST SODIUM 10 MG/1
10 TABLET ORAL AT BEDTIME
Refills: 0 | Status: DISCONTINUED | OUTPATIENT
Start: 2025-06-09 | End: 2025-06-21

## 2025-06-09 RX ORDER — ROSUVASTATIN CALCIUM 20 MG/1
5 TABLET, FILM COATED ORAL AT BEDTIME
Refills: 0 | Status: DISCONTINUED | OUTPATIENT
Start: 2025-06-09 | End: 2025-06-21

## 2025-06-09 RX ORDER — VANCOMYCIN HCL IN 5 % DEXTROSE 1.5G/250ML
1000 PLASTIC BAG, INJECTION (ML) INTRAVENOUS ONCE
Refills: 0 | Status: COMPLETED | OUTPATIENT
Start: 2025-06-09 | End: 2025-06-09

## 2025-06-09 RX ORDER — DIPHENHYDRAMINE HCL 12.5MG/5ML
50 ELIXIR ORAL ONCE
Refills: 0 | Status: COMPLETED | OUTPATIENT
Start: 2025-06-09 | End: 2025-06-09

## 2025-06-09 RX ORDER — BUDESONIDE 0.25 MG/2ML
1 SUSPENSION RESPIRATORY (INHALATION)
Refills: 0 | Status: DISCONTINUED | OUTPATIENT
Start: 2025-06-09 | End: 2025-06-21

## 2025-06-09 RX ORDER — LANOLIN/MINERAL OIL/PETROLATUM
1 OINTMENT (GRAM) OPHTHALMIC (EYE) EVERY 4 HOURS
Refills: 0 | Status: DISCONTINUED | OUTPATIENT
Start: 2025-06-09 | End: 2025-06-21

## 2025-06-09 RX ORDER — PREDNISONE 20 MG/1
40 TABLET ORAL DAILY
Refills: 0 | Status: DISCONTINUED | OUTPATIENT
Start: 2025-06-09 | End: 2025-06-09

## 2025-06-09 RX ORDER — FORMOTEROL FUMARATE DIHYDRATE 20 UG/2ML
20 SOLUTION RESPIRATORY (INHALATION) EVERY 12 HOURS
Refills: 0 | Status: DISCONTINUED | OUTPATIENT
Start: 2025-06-09 | End: 2025-06-21

## 2025-06-09 RX ORDER — INSULIN LISPRO 100 U/ML
INJECTION, SOLUTION INTRAVENOUS; SUBCUTANEOUS
Refills: 0 | Status: DISCONTINUED | OUTPATIENT
Start: 2025-06-09 | End: 2025-06-11

## 2025-06-09 RX ORDER — DEXTROSE 50 % IN WATER 50 %
12.5 SYRINGE (ML) INTRAVENOUS ONCE
Refills: 0 | Status: DISCONTINUED | OUTPATIENT
Start: 2025-06-09 | End: 2025-06-21

## 2025-06-09 RX ORDER — MEXILETINE HYDROCHLORIDE 250 MG/1
200 CAPSULE ORAL THREE TIMES A DAY
Refills: 0 | Status: DISCONTINUED | OUTPATIENT
Start: 2025-06-09 | End: 2025-06-21

## 2025-06-09 RX ORDER — DEXTROSE 50 % IN WATER 50 %
15 SYRINGE (ML) INTRAVENOUS ONCE
Refills: 0 | Status: DISCONTINUED | OUTPATIENT
Start: 2025-06-09 | End: 2025-06-21

## 2025-06-09 RX ORDER — PREDNISONE 20 MG/1
40 TABLET ORAL DAILY
Refills: 0 | Status: COMPLETED | OUTPATIENT
Start: 2025-06-09 | End: 2025-06-14

## 2025-06-09 RX ORDER — GLUCAGON 3 MG/1
1 POWDER NASAL ONCE
Refills: 0 | Status: DISCONTINUED | OUTPATIENT
Start: 2025-06-09 | End: 2025-06-21

## 2025-06-09 RX ORDER — LACTULOSE 10 G/15ML
15 SOLUTION ORAL
Refills: 0 | DISCHARGE

## 2025-06-09 RX ORDER — IPRATROPIUM BROMIDE AND ALBUTEROL SULFATE .5; 2.5 MG/3ML; MG/3ML
3 SOLUTION RESPIRATORY (INHALATION) ONCE
Refills: 0 | Status: COMPLETED | OUTPATIENT
Start: 2025-06-09 | End: 2025-06-09

## 2025-06-09 RX ORDER — LEVETIRACETAM 10 MG/ML
1000 INJECTION, SOLUTION INTRAVENOUS
Refills: 0 | Status: DISCONTINUED | OUTPATIENT
Start: 2025-06-09 | End: 2025-06-21

## 2025-06-09 RX ADMIN — Medication 2.5 MILLIGRAM(S): at 18:47

## 2025-06-09 RX ADMIN — Medication 75 MILLILITER(S): at 18:52

## 2025-06-09 RX ADMIN — METHYLPREDNISOLONE ACETATE 80 MILLIGRAM(S): 80 INJECTION, SUSPENSION INTRA-ARTICULAR; INTRALESIONAL; INTRAMUSCULAR; SOFT TISSUE at 06:45

## 2025-06-09 RX ADMIN — Medication 3 MILLILITER(S): at 11:31

## 2025-06-09 RX ADMIN — FORMOTEROL FUMARATE DIHYDRATE 20 MICROGRAM(S): 20 SOLUTION RESPIRATORY (INHALATION) at 17:42

## 2025-06-09 RX ADMIN — MEXILETINE HYDROCHLORIDE 200 MILLIGRAM(S): 250 CAPSULE ORAL at 22:31

## 2025-06-09 RX ADMIN — LABETALOL HYDROCHLORIDE 100 MILLIGRAM(S): 200 TABLET, FILM COATED ORAL at 22:30

## 2025-06-09 RX ADMIN — LEVETIRACETAM 1000 MILLIGRAM(S): 10 INJECTION, SOLUTION INTRAVENOUS at 17:41

## 2025-06-09 RX ADMIN — ROSUVASTATIN CALCIUM 5 MILLIGRAM(S): 20 TABLET, FILM COATED ORAL at 22:30

## 2025-06-09 RX ADMIN — Medication 1 DROP(S): at 22:31

## 2025-06-09 RX ADMIN — Medication 250 MILLIGRAM(S): at 09:09

## 2025-06-09 RX ADMIN — IPRATROPIUM BROMIDE AND ALBUTEROL SULFATE 3 MILLILITER(S): .5; 2.5 SOLUTION RESPIRATORY (INHALATION) at 05:07

## 2025-06-09 RX ADMIN — Medication 2 TABLET(S): at 22:30

## 2025-06-09 RX ADMIN — Medication 4 MILLIGRAM(S): at 05:57

## 2025-06-09 RX ADMIN — Medication 400 MILLIGRAM(S): at 06:46

## 2025-06-09 RX ADMIN — Medication 1 DROP(S): at 17:47

## 2025-06-09 RX ADMIN — BUDESONIDE 1 MILLIGRAM(S): 0.25 SUSPENSION RESPIRATORY (INHALATION) at 18:48

## 2025-06-09 RX ADMIN — Medication 4 MILLIGRAM(S): at 06:46

## 2025-06-09 RX ADMIN — Medication 1 DOSE(S): at 18:50

## 2025-06-09 RX ADMIN — LABETALOL HYDROCHLORIDE 100 MILLIGRAM(S): 200 TABLET, FILM COATED ORAL at 17:41

## 2025-06-09 RX ADMIN — Medication 500 MILLILITER(S): at 06:48

## 2025-06-09 RX ADMIN — ERTAPENEM SODIUM 100 MILLIGRAM(S): 1 INJECTION, POWDER, LYOPHILIZED, FOR SOLUTION INTRAMUSCULAR; INTRAVENOUS at 10:31

## 2025-06-09 RX ADMIN — Medication 50 MILLIGRAM(S): at 09:27

## 2025-06-09 RX ADMIN — INSULIN GLARGINE-YFGN 28 UNIT(S): 100 INJECTION, SOLUTION SUBCUTANEOUS at 22:31

## 2025-06-09 RX ADMIN — Medication 2.5 MILLIGRAM(S): at 23:53

## 2025-06-09 RX ADMIN — LACOSAMIDE 100 MILLIGRAM(S): 150 TABLET, FILM COATED ORAL at 17:41

## 2025-06-09 RX ADMIN — MONTELUKAST SODIUM 10 MILLIGRAM(S): 10 TABLET ORAL at 22:30

## 2025-06-09 NOTE — H&P ADULT - CONVERSATION DETAILS
Conversation held with HCP/daughter/PoA, Zoe Flores. Introduced self and team and role in care of patient. Reviewed events leading to patient's current status, and overall condition at present. Discussed treatment preferences, including chest compression and intubation. Zoe centeno would want her mother to be FULL CODE.

## 2025-06-09 NOTE — ED PROVIDER NOTE - PROGRESS NOTE DETAILS
Perri Comer MD PGY-3: patient with ongoing retching/coughing, endorsing nausea but only bringing up small amounts of mucus. WBC 25K, is on chronic steroids but given progression of symptoms will expand work up with blood cultures, give IV vanc and ertapenem per last admission, patient with anaphylaxis to multiple antibiotics so options are limited. will also add CT chest/abd/pelvis to rule out acute process Neil FARLEY PGY4 Fellow: Signed out by Dr. Sebastian is a 74-year-old female with complex medical history presenting for cough, shortness of breath.  Being treated for COPD exacerbation, suspicion for pneumonia.  Labs remarkable for elevated white count.  Significant delay in antibiotics and fluids due to multiple attempts at IV access including with ultrasound failing. Neil FARLEY PGY4 Fellow: Access obtained in [facility]. Will cancel CT, high suspicion for pulmonary source of infection and CTA would be likely to infiltrate IV access. Neil PGY4 Fellow: Discussed with hospitalist, will admit and place on telemetry as patient noted to have HR in the high 30's with coughing likely due to vagal stimulation, recovers without intervention after she stops coughing. Neil FARLEY PGY4 Fellow: Access obtained in [facility]. Will cancel CT, high suspicion for pulmonary source of infection and CTA would be likely to infiltrate IV access.

## 2025-06-09 NOTE — H&P ADULT - PROBLEM SELECTOR PLAN 6
Continue home labetalol and nifedipine with hold parameters    Care plan discussed with daughter via phone.   Discussed with pulmonology  Discussed with ACP

## 2025-06-09 NOTE — ED ADULT NURSE NOTE - NSFALLHARMRISKINTERV_ED_ALL_ED
Assistance OOB with selected safe patient handling equipment if applicable/Assistance with ambulation/Communicate risk of Fall with Harm to all staff, patient, and family/Provide visual cue: red socks, yellow wristband, yellow gown, etc/Reinforce activity limits and safety measures with patient and family/Bed in lowest position, wheels locked, appropriate side rails in place/Call bell, personal items and telephone in reach/Instruct patient to call for assistance before getting out of bed/chair/stretcher/Non-slip footwear applied when patient is off stretcher/Irene to call system/Physically safe environment - no spills, clutter or unnecessary equipment/Purposeful Proactive Rounding/Room/bathroom lighting operational, light cord in reach

## 2025-06-09 NOTE — H&P ADULT - HISTORY OF PRESENT ILLNESS
73 yo F with a PMH of Epilepsy on Keppra/lacosamide, COPD, asthma  (on CPAP at home, on chronic steroids), DM2, bronchiectasis, tracheomalacia s/p tracheloplasty, HTN, Hx of dissection of descending thoracic aorta, hx of aortic aneurysm, Type B dissection (7/2024), medically managed initially as she did not meet criteria for surgery at that time), evaluated by Dr. 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     History obtained from daughter, Zoe, as patient not responding to questions, those eyes open spontaneously to voice and light touch.   She reports that patient intermittently confused since most recent hospitalization.  She noted worsening productive cough. Also with N/V- non-bloody, non-bilious. No fevers at home. She was recently started on Bactrim by her outpatient pulmonologist, Dr. Allison, for MRSA in sputum.    Per report, patient had ?hemoptysis during CT chest, however, not collected.

## 2025-06-09 NOTE — CONSULT NOTE ADULT - ASSESSMENT
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 Patient reporting worsening cough and SOB over last 2-3 days. Patient recently dx with MRSA on sputum culture, started treatment on Bactrim. Reports she recently started Bactrim ds 1 BID x 10 days (6/5/2025)    Afebrile here in the ED. WBC on admission elevated to 25K. CXR on admission showing Right patchy airspace opacity may reflect pneumonia.    # Suspected PNA  # Recent MRSA PNA  # Abnormal CXR  # Leucocytosis   # Immunocompromised host   # severe persistent asthma-steroid dependent  # bronchiectasis/COPD-   # H/o multiple infections   # Chronic cough   # H/O multiple antibiotic allergies     MICRO:   06/09 Bcx- IP     06/02/25- Sputum cx- MRSA S- doxy, S- bactrim, S- linezolid, S- vancomycin     Abx-   Ertapenem and Vancomycin 06/09-- current     Recommendations:   - CT chest   - Patient tolerated Ertapenem and Vancomycin can continue the same pending below. Will de-escalate based on the cx data.  - Monitor V. levels, AUC goal 400-600    - F/U bcx from admission   - Get sputum cultures today, MRSA nares  - Get urine legionella and urine strep antigen   - Monitor CBC w differentials  - F/U pulmonary        In addition to reviewing history, imaging, documents, labs, microbiology, took into account antibiotic stewardship, local antibiogram and infection control strategies and potential transmission issues.    Discussed with the primary team.    Mally Ramirez MD  Attending Physician, Division of Infectious Diseases  Department of Medicine   [facility]    Contact on TEAMS messaging from 9am - 5pm  Office: [phone] (after 5 PM or weekend)

## 2025-06-09 NOTE — H&P ADULT - PROBLEM SELECTOR PLAN 2
with possible exacerbation  Home Regimen: Albuterol Q6H -> Chest Vest -> Perforomist BID  -> Pulmicort BID, - > Ohtuvayre 2.5 BID - > HyperSal 7% Q12H. Patient also on Breo Ellipta 200 at 1 inhalation QD, Incruse Ellipta 1 puff QD, and on Tezspire airway   Pulm recs appreciated  Continue home montelukast, albuterol Q6H, Perforomist and Ohtuvayre (patient own medication), Pulmicort. Advair/Spiriva (in place of Breo/Incruse, not on formulary). Tezspire as OP  Hold hypersaline due to possible hemoptysis. Hold home glycopyrrolate in order to collect secretions for sputum culture.  Takes methylprednisolone 8mg daily at home - > increase to prednisone 40mg daily for now per pulmonology  Continue chest PT and home CPAP QHS

## 2025-06-09 NOTE — H&P ADULT - PROBLEM SELECTOR PLAN 1
Met SIRS with leukocytosis and tachpnyea (RR 22) on initial presentation. Present on admission.   Personally reviewed CXR - Right basilar opacity.   Patient with many antibiotic allergies - continue ertapenem and Vanco for now  Follow-up blood cultures collected in ED. Obtain sputum culture, MRSA, Ur Strep and Legionella  Follow-up CT Chest  Consult Pulm and ID

## 2025-06-09 NOTE — ED ADULT NURSE NOTE - CADM POA PRESS ULCER
Unable to assess at this time due to patient’s acuity [Consultation Follow Up] : a consultation follow up  [Father] : father No

## 2025-06-09 NOTE — ADVANCED PRACTICE NURSE CONSULT - RECOMMEDATIONS
Will recommend:    1. Monitor output.  2. Empty pouch when 1/3-1/2 full.  3. Change pouching system 2x a week, every 3-4 days.  4. Contact ostomy specialists if questions/issues arise.  5. Supplies: Richar 2 1/4" Ceraplus flat skin barrier (#48854), Richar 2 1/4" drainable pouch (#[id number]); Accessory products:  stoma paste #[id number], stoma powder (#1952) & Cavilon No sting barrier film wipe (#4030), stoma belt #9909

## 2025-06-09 NOTE — ED ADULT NURSE NOTE - CAS TRG GEN SKIN COLOR
Patient called to ask regarding [employer] message that was sent from hospital regarding \"final surgery instructions\". Patient wants to know if she'd have to stop taking a medication prescribed by PCP. Patient was advised to contact the number on the [employer] message/PCP.  Patient stated she is just worried she may not hear back in time for surgery, please advise thank you Normal for race/Pink

## 2025-06-09 NOTE — CONSULT NOTE ADULT - SUBJECTIVE AND OBJECTIVE BOX
Patient is a 74y old  Female who presents with a chief complaint of   HPI:       prior hospital charts reviewed [ X ]  primary team notes reviewed [ X ]  other consultant notes reviewed [X  ]    PAST MEDICAL & SURGICAL HISTORY:  Seizure  x 1 1/7/18      DVT (deep venous thrombosis)  15-20 years ago, took coumadin      TIA (transient ischemic attack)  multiple, last 5 years ago - presents as right-sided weakness      COPD (chronic obstructive pulmonary disease)      Atrial fibrillation      History of COPD      Afib      Aortic valve replaced      Epilepsy      Asthma      DM (diabetes mellitus)      History of seizure disorder      History of TIAs      HTN (hypertension)      Bronchiectasis      Colon cancer      History of partial hysterectomy  30 years ago - fibroids      H/O total knee replacement, bilateral  5 years ago      History of sinus surgery  multiple sinus surgeries      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      H/O pelvic surgery  5 years ago - s/p fracture      History of tracheomalacia  2015 - attempted tracheal stenting ([facility])- course complicated by obstruction, respiratory failure, multiple CPR attempts -  stent discontinued; 10/20/2016 Tracheobronchoplasty (Prolene Mesh) performed at [facility] by Dr Zapien      S/P bronchoscopy  6/5/2018 - [place] (Dr Zapien) no evidence of tracheobronchomalacia in trachea or bronchial tubes      Rectal bleeding  exam under anesthesia ([facility]) 2/2018      S/P TAVR (transcatheter aortic valve replacement)      S/P aortic valve replacement      S/P colostomy      S/P AVR (aortic valve replacement)          Allergies  Valium (Short breath)  OxyContin (Unknown)  Valium (Unknown)  Avelox (Short breath; Pruritus)  penicillin (Anaphylaxis)  Dilaudid (Short breath)  shellfish (Anaphylaxis)  ampicillin (Unknown)  codeine (Short breath)  Percocet (Unknown)  tetanus toxoid (Short breath)  cefepime (Anaphylaxis)  cefepime (Short breath (Severe))  codeine (Unknown)  iodine (Short breath; Swelling)  aspirin (Short breath)  meropenem (Unknown)    ANTIMICROBIALS (past 90 days)  MEDICATIONS  (STANDING):  ertapenem  IVPB   100 mL/Hr IV Intermittent (06-09-25 @ 10:31)    vancomycin  IVPB.   250 mL/Hr IV Intermittent (06-09-25 @ 09:09)          MEDICATIONS  (STANDING):  albuterol    0.083% 2.5 every 6 hours  apixaban 5 every 12 hours  budesonide    Inhalation Suspension 1 two times a day  dextrose 50% Injectable 25 once  dextrose 50% Injectable 12.5 once  dextrose 50% Injectable 25 once  dextrose Oral Gel 15 once PRN  famotidine    Tablet 20 daily  fluticasone propionate/ salmeterol 250-50 MICROgram(s) Diskus 1 two times a day  formoterol for nebulization 20 every 12 hours  glucagon  Injectable 1 once  glycopyrrolate 1 three times a day  insulin glargine Injectable (LANTUS) 28 at bedtime  insulin lispro (ADMELOG) corrective regimen sliding scale  three times a day before meals  labetalol 100 every 8 hours  lacosamide 100 two times a day  levETIRAcetam 1000 two times a day  melatonin 3 at bedtime PRN  methylPREDNISolone 8 daily  mexiletine 200 three times a day  montelukast 10 at bedtime  NIFEdipine XL 60 daily  ondansetron Injectable 4 every 8 hours PRN  pantoprazole    Tablet 40 before breakfast  rosuvastatin 5 at bedtime  senna 2 at bedtime  sertraline 50 daily  sodium chloride 7% Inhalation 4 every 12 hours  tiotropium 2.5 MICROgram(s) Inhaler 2 daily    SOCIAL HISTORY:   Denies  alcohol, Denies smoking and Denies drug use   Lives with her daughter, reports no recent travel and no sick contacts     FAMILY HISTORY:  Family history of asthma    Family history of breast cancer (Sibling)    Family history of diabetes mellitus type II      REVIEW OF SYSTEMS  [  ] ROS unobtainable because:    [ X ] All other systems negative except as noted below:	    Constitutional:  [ ] fever [ ] chills  [ ] weight loss  [X ] weakness  Skin:  [ ] rash [ ] phlebitis	  Eyes: [ ] icterus [ ] pain  [ ] discharge	  ENMT: [ ] sore throat  [ ] thrush [ ] ulcers [ ] exudates  Respiratory: [ X] dyspnea [ ] hemoptysis [X ] cough [X ] sputum	  Cardiovascular:  [ ] chest pain [ ] palpitations [ ] edema	  Gastrointestinal:  [ ] nausea [ ] vomiting [ ] diarrhea [ ] constipation [ ] pain	  Genitourinary:  [ ] dysuria [ ] frequency [ ] hematuria [ ] discharge [ ] flank pain  [ ] incontinence  Musculoskeletal:  [ ] myalgias [ ] arthralgias [ ] arthritis  [ ] back pain  Neurological:  [ ] headache [ ] seizures  [ ] confusion/altered mental status  Psychiatric:  [ ] anxiety [ ] depression	  Hematology/Lymphatics:  [ ] lymphadenopathy  Endocrine:  [ ] adrenal [ ] thyroid  Allergic/Immunologic:	 [ ] transplant [ ] seasonal    Vital Signs Last 24 Hrs  T(F): 98.2 (06-09-25 @ 11:28), Max: 98.2 (06-09-25 @ 04:09)  Vital Signs Last 24 Hrs  HR: 60 (06-09-25 @ 11:28) (55 - 72)  BP: 141/62 (06-09-25 @ 11:28) (139/64 - 155/88)  RR: 20 (06-09-25 @ 11:28)  SpO2: 96% (06-09-25 @ 11:28) (95% - 98%)  Wt(kg): --    PHYSICAL EXAM:  GENERAL: Awake, alert, appears uncomfortable, coughing with mucus production   \HEAD: NC/AT, neck supple, moist mucous membranes  \EYES: PERRL, EOM grossly intact, sclera anicteric  \LUNGS: coarse/rhonchorous breath sounds bilaterally,   CARDIAC: RRR, no m/r/g  ABDOMEN: Soft, non tender, ostomy site well appearing with brown stool output  EXT: No edema, no calf tenderness, no deformities, B/L Knee TKA  NEURO: A&Ox3. Moving all extremities without focal deficit.   SKIN: Warm and dry. No rash.    LABS:                         12.6   25.73 )-----------( 206      ( 09 Jun 2025 06:25 )             41.0   06-09    138  |  105  |  26[H]  ----------------------------<  65[L]  4.8   |  16[L]  |  0.90    Ca    9.1      09 Jun 2025 06:25  Mg     2.6     06-09    TPro  6.6  /  Alb  4.0  /  TBili  0.3  /  DBili  x   /  AST  29  /  ALT  23  /  AlkPhos  69  06-09      MICROBIOLOGY:              RADIOLOGY:  imaging below personally reviewed and agree with findings      ******PRELIMINARY REPORT******      ******PRELIMINARY REPORT******         ACC: [id number] EXAM:  XR CHEST PORTABLE URGENT 1V   ORDERED BY:  PRACHI SANTANA     PROCEDURE DATE:  06/09/2025    ******PRELIMINARY REPORT******      ******PRELIMINARY REPORT******           INTERPRETATION:  CLINICAL INDICATION: Shortness of breath    EXAM: Frontal radiograph of the chest.    COMPARISON: Chest radiograph from 5/7/2025    FINDINGS:  Status post medial sternotomy.  Right patchy airspace opacity.  S/P TAVR.  There is no pleural effusion or pneumothorax.  The heart is not well evaluated in this position  The visualized osseous structures demonstrate no acute pathology.    IMPRESSION:  Right patchy airspace opacity may reflect pneumonia.

## 2025-06-09 NOTE — CONSULT NOTE ADULT - ASSESSMENT
Patient is a 73 yo F w/ asthma (chronic methypred 8mg PO daily), bronchiectasis, allergic rhinitis, recurrent PNA, SDB ( on CPAP), tracheomalacia s/p tracheoplasty, tracheobronchomalacia, pAfib (Eliquis), colorectal ca s/p colostomy, aortic dissection s/p ascending aorta repair who p/w cough, SOB and hemoptysis.     Labs notable for leukocytoisis WBC 25, Hb 12.6, Plts 206, Bicarb 16, Craetinine 0.9, BNP 1109, Fluvid -, VBG 7.38/40/64 Lactate 1.7 improved to 1.8    CXR with R basilar opacity    Outpatient sputum cultures from 6/2 growing MRSA    Home Airway clearance regimen: Albuterol q6h -> Chest Vest -> Perforomist BID  -> Pulmicort BID, - > Ohtuvayre 2.5 BID - > HyperSal 7% q12h. Patient also on Breo Ellipta 200 at 1 inhalation QD, Incruse Ellipta 1 puff QD, and on Tezspire airway     #SOB - Likely 2/2 COPD exacerbation/PNA  #R PNA  #Hemoptysis  #Acute Asthma exacerbation  #SDB - on CPAP  - c/w Airway clearance regimen as possible Albuterol q6h -> Chest Vest -> Perforomist BID (patient brought in) -> Pulmicort BID, - > Ohtuvayre 2.5 BID (patient brought in). Would hold 7% hypersal for now given reported hemoptysis  - Please collect all hemoptysis at bedside in cup. If greater than 150 cc in 24 hours or greater than 100 cc in 1 hour, please call Pulm/MICU immediately. Would hold Eliquis for now, if hemoptysis worsens, can order TXA nebs q8h  - Patient also on Breo Ellipta 200 at 1 inhalation QD, Incruse Ellipta 1 puff QD. Can use Advair/Spiriva while admitted  - Tezspire outpatient   - Agree with empiric Vanc/Ertapenem for now. f/u MRSA PCR, cultures, urine legionell, urine strep  - f/u CT chest  - c/w home CPAP and Chest Vest for use  - Can increase steroids to Pred 40mg daily for now    Shaan Shah MD  Pulmonary & Critical Care

## 2025-06-09 NOTE — ED PROVIDER NOTE - PHYSICAL EXAMINATION
GENERAL: Awake, alert, appears uncomfortable, coughing  HEAD: NC/AT, neck supple, moist mucous membranes  EYES: PERRL, EOM grossly intact, sclera anicteric  LUNGS: coarse/rhonchorous breath sounds bilaterally without significant wheeze, coughing up mucus   CARDIAC: RRR, no m/r/g  ABDOMEN: Soft, non tender, non distended, no rebound, no guarding  EXT: No edema, no calf tenderness, no deformities.  NEURO: A&Ox3. Moving all extremities without focal deficit.   SKIN: Warm and dry. No rash.  PSYCH: Normal affect. GENERAL: Awake, alert, appears uncomfortable, coughing  HEAD: NC/AT, neck supple, moist mucous membranes  EYES: PERRL, EOM grossly intact, sclera anicteric  LUNGS: coarse/rhonchorous breath sounds bilaterally without significant wheeze, coughing up mucus   CARDIAC: RRR, no m/r/g  ABDOMEN: Soft, non tender, ostomy site well appearing with brown stool output  EXT: No edema, no calf tenderness, no deformities.  NEURO: A&Ox3. Moving all extremities without focal deficit.   SKIN: Warm and dry. No rash.  PSYCH: Normal affect.

## 2025-06-09 NOTE — ED ADULT NURSE REASSESSMENT NOTE - NS ED NURSE REASSESS COMMENT FT1
received report Sen GARCIA. US IV is not infusing fluids. MD aware. RN attempting PIV placement. pending BC x2, VBG and antibiotics.

## 2025-06-09 NOTE — ED ADULT NURSE NOTE - NSICDXPASTSURGICALHX_GEN_ALL_CORE_FT
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    H/O pelvic surgery 5 years ago - s/p fracture    H/O total knee replacement, bilateral 5 years ago    History of partial hysterectomy 30 years ago - fibroids    History of sinus surgery multiple sinus surgeries    History of tracheomalacia 2015 - attempted tracheal stenting ([facility])- course complicated by obstruction, respiratory failure, multiple CPR attempts -  stent discontinued; 10/20/2016 Tracheobronchoplasty (Prolene Mesh) performed at [facility] by Dr Zapien    Rectal bleeding exam under anesthesia ([facility]) 2/2018    S/P aortic valve replacement     S/P AVR (aortic valve replacement)     S/P bronchoscopy 6/5/2018 - [place] (Dr Zapien) no evidence of tracheobronchomalacia in trachea or bronchial tubes    S/P colostomy     S/P TAVR (transcatheter aortic valve replacement)

## 2025-06-09 NOTE — CONSULT NOTE ADULT - SUBJECTIVE AND OBJECTIVE BOX
CHIEF COMPLAINT: SOB    HPI: Patient is a 75 yo F w/ asthma (chronic methypred 8mg PO daily), bronchiectasis, allergic rhinitis, recurrent PNA, SDB ( on CPAP), tracheomalacia s/p tracheoplasty, tracheobronchomalacia, pAfib (Eliquis), colorectal ca s/p colostomy, aortic dissection s/p ascending aorta repair who p/w cough, SOB and hemoptysis.     Labs notable for leukocytoisis WBC 25, Hb 12.6, Plts 206, Bicarb 16, Craetinine 0.9, BNP 1109, Fluvid -, VBG 7.38/40/64 Lactate 1.7 improved to 1.8    CXR with R basilar opacity    Outpatient sputum cultures from 6/2 growing MRSA    Home Airway clearance regimen: Albuterol q6h -> Chest Vest -> Perforomist BID  -> Pulmicort BID, - > Ohtuvayre 2.5 BID - > HyperSal 7% q12h. Patient also on Breo Ellipta 200 at 1 inhalation QD, Incruse Ellipta 1 puff QD, and on Tezspire airway     PAST MEDICAL & SURGICAL HISTORY:  Seizure  x 1 1/7/18      DVT (deep venous thrombosis)  15-20 years ago, took coumadin      TIA (transient ischemic attack)  multiple, last 5 years ago - presents as right-sided weakness      COPD (chronic obstructive pulmonary disease)      Atrial fibrillation      History of COPD      Afib      Aortic valve replaced      Epilepsy      Asthma      DM (diabetes mellitus)      History of seizure disorder      History of TIAs      HTN (hypertension)      Bronchiectasis      Colon cancer      History of partial hysterectomy  30 years ago - fibroids      H/O total knee replacement, bilateral  5 years ago      History of sinus surgery  multiple sinus surgeries      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      H/O pelvic surgery  5 years ago - s/p fracture      History of tracheomalacia  2015 - attempted tracheal stenting ([facility])- course complicated by obstruction, respiratory failure, multiple CPR attempts -  stent discontinued; 10/20/2016 Tracheobronchoplasty (Prolene Mesh) performed at [facility] by Dr Zapien      S/P bronchoscopy  6/5/2018 - [place] (Dr Zapien) no evidence of tracheobronchomalacia in trachea or bronchial tubes      Rectal bleeding  exam under anesthesia ([facility]) 2/2018      S/P TAVR (transcatheter aortic valve replacement)      S/P aortic valve replacement      S/P colostomy      S/P AVR (aortic valve replacement)          FAMILY HISTORY:  Family history of asthma    Family history of breast cancer (Sibling)    Family history of diabetes mellitus type II        SOCIAL HISTORY:  Smoking: [X] Not current   Substance Use: [ ] Never Used [ ] Used ____  EtOH Use:  Marital Status: [ ] Single [ ]  [ ]  [ ]   Sexual History:   Occupation:  Recent Travel:  Country of Birth:  Advance Directives:    Allergies    Valium (Short breath)  OxyContin (Unknown)  Valium (Unknown)  Avelox (Short breath; Pruritus)  penicillin (Anaphylaxis)  Dilaudid (Short breath)  shellfish (Anaphylaxis)  ampicillin (Unknown)  codeine (Short breath)  Percocet (Unknown)  tetanus toxoid (Short breath)  cefepime (Anaphylaxis)  cefepime (Short breath (Severe))  codeine (Unknown)  iodine (Short breath; Swelling)  aspirin (Short breath)  meropenem (Unknown)    Intolerances        HOME MEDICATIONS:  Home Medications:  ascorbic acid 500 mg oral tablet: 1 tab(s) orally once a day (in the morning) (09 Jun 2025 13:04)  Breo Ellipta 200 mcg-25 mcg/inh inhalation powder: 1 puff(s) inhaled once a day (09 Jun 2025 13:04)  budesonide 1 mg/2 mL inhalation suspension: 2 milliliter(s) by nebulizer 2 times a day (09 Jun 2025 13:04)  Cranberry oral tablet: 1 tab(s) orally once a day (in the morning) (09 Jun 2025 13:04)  DuoNeb 0.5 mg-2.5 mg/3 mL inhalation solution: 3 milliliter(s) by nebulizer 4 times a day (09 Jun 2025 13:04)  Eliquis 5 mg oral tablet: 1 tab(s) orally 2 times a day (09 Jun 2025 13:03)  famotidine 20 mg oral tablet: 1 tab(s) orally once a day (at bedtime) (09 Jun 2025 13:04)  ferrous sulfate: 325 milligram(s) orally once a day (09 Jun 2025 13:04)  glycopyrrolate 1 mg oral tablet: 1 tab(s) orally 3 times a day (09 Jun 2025 13:04)  Incruse Ellipta 62.5 mcg/inh inhalation powder: 1 inhaler(s) inhaled once a day (09 Jun 2025 13:04)  Keppra 500 mg oral tablet: 2 tab(s) orally 2 times a day (09 Jun 2025 13:04)  labetalol 100 mg oral tablet: 1 tab(s) orally every 8 hours (09 Jun 2025 13:04)  lacosamide 100 mg oral tablet: 1 tab(s) orally 2 times a day (09 Jun 2025 13:04)  lactulose: 15 milliliter(s) orally once a day (09 Jun 2025 13:03)  Lantus Solostar Pen 100 units/mL subcutaneous solution: 28 unit(s) subcutaneous once a day (at bedtime) 22 units and 28 units PM (09 Jun 2025 13:00)  megestrol 20 mg oral tablet: 1 tab(s) orally once a day as needed for appetite (09 Jun 2025 13:04)  methylPREDNISolone 8 mg oral tablet: 1 tab(s) orally once a day (09 Jun 2025 13:04)  mexiletine 200 mg oral capsule: 1 cap(s) orally 3 times a day (09 Jun 2025 13:04)  montelukast 10 mg oral tablet: 1 tab(s) orally once a day (at bedtime) (09 Jun 2025 13:04)  multivitamin: 1 tab(s) orally once a day (09 Jun 2025 13:04)  Ohtuvayre 3 mg/ 2.5mL inhalation suspension: 3 milligram(s) by nebulizer 2 times a day (09 Jun 2025 13:04)  pantoprazole 40 mg oral delayed release tablet: 1 tab(s) orally once a day (09 Jun 2025 13:04)  Perforomist 20 mcg/2 mL inhalation solution: 2 milliliter(s) inhaled 2 times a day (09 Jun 2025 13:04)  rosuvastatin 5 mg oral tablet: 1 tab(s) orally once a day (at bedtime) (09 Jun 2025 13:04)  Senna 8.6 mg oral tablet: 2 tab(s) orally once a day (09 Jun 2025 13:04)  sertraline 50 mg oral tablet: 1 tab(s) orally once a day (in the morning) (09 Jun 2025 13:04)  Sodium Chloride 7% Inhalation Solution: twice daily (09 Jun 2025 13:04)  Tezspire Pre-filled Pen 210 mg/1.91 mL subcutaneous solution: 210 milligram(s) subcutaneously once a month (09 Jun 2025 13:04)      REVIEW OF SYSTEMS:  Constitutional: [ ] negative [ ] fevers [ ] chills [ ] weight loss [ ] weight gain  HEENT: [ ] negative [ ] dry eyes [ ] eye irritation [ ] postnasal drip [ ] nasal congestion  CV: [ ] negative  [ ] chest pain [ ] orthopnea [ ] palpitations [ ] murmur  Resp: [ ] negative [X] cough [X] shortness of breath [ ] dyspnea [ ] wheezing [X] sputum [X] hemoptysis  GI: [ ] negative [ ] nausea [ ] vomiting [ ] diarrhea [ ] constipation [ ] abd pain [ ] dysphagia   : [ ] negative [ ] dysuria [ ] nocturia [ ] hematuria [ ] increased urinary frequency  Musculoskeletal: [ ] negative [ ] back pain [ ] myalgias [ ] arthralgias [ ] fracture  Skin: [ ] negative [ ] rash [ ] itch  Neurological: [ ] negative [ ] headache [ ] dizziness [ ] syncope [ ] weakness [ ] numbness  Psychiatric: [ ] negative [ ] anxiety [ ] depression  Endocrine: [ ] negative [ ] diabetes [ ] thyroid problem  Hematologic/Lymphatic: [ ] negative [ ] anemia [ ] bleeding problem  Allergic/Immunologic: [ ] negative [ ] itchy eyes [ ] nasal discharge [ ] hives [ ] angioedema  [X] All other systems negative  [ ] Unable to assess ROS because ________    OBJECTIVE:  ICU Vital Signs Last 24 Hrs  T(C): 36.8 (09 Jun 2025 11:28), Max: 36.8 (09 Jun 2025 04:09)  T(F): 98.2 (09 Jun 2025 11:28), Max: 98.2 (09 Jun 2025 04:09)  HR: 60 (09 Jun 2025 11:28) (55 - 72)  BP: 141/62 (09 Jun 2025 11:28) (139/64 - 155/88)  BP(mean): --  ABP: --  ABP(mean): --  RR: 20 (09 Jun 2025 11:28) (20 - 22)  SpO2: 96% (09 Jun 2025 11:28) (95% - 98%)    O2 Parameters below as of 09 Jun 2025 11:28  Patient On (Oxygen Delivery Method): room air              CAPILLARY BLOOD GLUCOSE          PHYSICAL EXAM:  General: NAD  HEENT: EOMI, PERRLA, Exophthalmos  Neck: Supple  Respiratory: Scattered rhochi  Cardiovascular: S1S2, irregular  Abdomen: Soft, NTND, BS+  Extremities: No edema  Skin: Warm and dry, Bandage on L shin  Neurological: No gross focal deficits    LINES:     HOSPITAL MEDICATIONS:  Standing Meds:  albuterol    0.083% 2.5 milliGRAM(s) Nebulizer every 6 hours  apixaban 5 milliGRAM(s) Oral every 12 hours  artificial  tears Solution 1 Drop(s) Both EYES every 4 hours  budesonide    Inhalation Suspension 1 milliGRAM(s) Inhalation two times a day  dextrose 5%. 1000 milliLiter(s) IV Continuous <Continuous>  dextrose 5%. 1000 milliLiter(s) IV Continuous <Continuous>  dextrose 50% Injectable 25 Gram(s) IV Push once  dextrose 50% Injectable 12.5 Gram(s) IV Push once  dextrose 50% Injectable 25 Gram(s) IV Push once  famotidine    Tablet 20 milliGRAM(s) Oral daily  fluticasone propionate/ salmeterol 250-50 MICROgram(s) Diskus 1 Dose(s) Inhalation two times a day  formoterol for nebulization 20 MICROGram(s) Nebulizer every 12 hours  glucagon  Injectable 1 milliGRAM(s) IntraMuscular once  glycopyrrolate 1 milliGRAM(s) Oral three times a day  insulin glargine Injectable (LANTUS) 28 Unit(s) SubCutaneous at bedtime  insulin lispro (ADMELOG) corrective regimen sliding scale   SubCutaneous three times a day before meals  labetalol 100 milliGRAM(s) Oral every 8 hours  lacosamide 100 milliGRAM(s) Oral two times a day  levETIRAcetam 1000 milliGRAM(s) Oral two times a day  methylPREDNISolone 8 milliGRAM(s) Oral daily  mexiletine 200 milliGRAM(s) Oral three times a day  montelukast 10 milliGRAM(s) Oral at bedtime  NIFEdipine XL 60 milliGRAM(s) Oral daily  Ohtuvayre 3 mg/2.5 mL Inhalation 3 milliGRAM(s) 3 milliGRAM(s) Nebulizer every 12 hours  pantoprazole    Tablet 40 milliGRAM(s) Oral before breakfast  rosuvastatin 5 milliGRAM(s) Oral at bedtime  senna 2 Tablet(s) Oral at bedtime  sertraline 50 milliGRAM(s) Oral daily  sodium chloride 0.9%. 1000 milliLiter(s) IV Continuous <Continuous>  sodium chloride 7% Inhalation 4 milliLiter(s) Inhalation every 12 hours  tiotropium 2.5 MICROgram(s) Inhaler 2 Puff(s) Inhalation daily      PRN Meds:  dextrose Oral Gel 15 Gram(s) Oral once PRN  melatonin 3 milliGRAM(s) Oral at bedtime PRN  ondansetron Injectable 4 milliGRAM(s) IV Push every 8 hours PRN      LABS:                        12.6   25.73 )-----------( 206      ( 09 Jun 2025 06:25 )             41.0     Hgb Trend: 12.6<--  06-09    138  |  105  |  26[H]  ----------------------------<  65[L]  4.8   |  16[L]  |  0.90    Ca    9.1      09 Jun 2025 06:25  Mg     2.6     06-09    TPro  6.6  /  Alb  4.0  /  TBili  0.3  /  DBili  x   /  AST  29  /  ALT  23  /  AlkPhos  69  06-09    Creatinine Trend: 0.90<--    Urinalysis Basic - ( 09 Jun 2025 06:25 )    Color: x / Appearance: x / SG: x / pH: x  Gluc: 65 mg/dL / Ketone: x  / Bili: x / Urobili: x   Blood: x / Protein: x / Nitrite: x   Leuk Esterase: x / RBC: x / WBC x   Sq Epi: x / Non Sq Epi: x / Bacteria: x        Venous Blood Gas:  06-09 @ 11:02  --/--/--/--/--  VBG Lactate: 1.8  Venous Blood Gas:  06-09 @ 07:55  7.38/40/64/24/90.9  VBG Lactate: 2.7      MICROBIOLOGY:       RADIOLOGY:  [ ] Reviewed and interpreted by me    PULMONARY FUNCTION TESTS:    EKG:

## 2025-06-09 NOTE — H&P ADULT - NSICDXPASTSURGICALHX_GEN_ALL_CORE_FT
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    H/O pelvic surgery 5 years ago - s/p fracture    H/O total knee replacement, bilateral 5 years ago    History of partial hysterectomy 30 years ago - fibroids    History of sinus surgery multiple sinus surgeries    History of tracheomalacia 2015 - attempted tracheal stenting ([facility])- course complicated by obstruction, respiratory failure, multiple CPR attempts -  stent discontinued; 10/20/2016 Tracheobronchoplasty (Prolene Mesh) performed at [facility] by Dr Zapien    Rectal bleeding exam under anesthesia ([facility]) 2/2018    S/P aortic valve replacement     S/P AVR (aortic valve replacement)     S/P bronchoscopy 6/5/2018 - [place] (Dr Zapien) no evidence of tracheobronchomalacia in trachea or bronchial tubes    S/P colostomy     S/P TAVR (transcatheter aortic valve replacement)

## 2025-06-09 NOTE — ED PROVIDER NOTE - ATTENDING CONTRIBUTION TO CARE
MD Sebastian:  patient seen and evaluated personally.   I agree with the History & Physical,  Impression & Plan other than what was detailed in my note.  MD Sebastian  74Y F complex PMH Epilepsy on Keppra, COPD, asthma  (on CPAP and VATS at home, on chronic steroids), DM2, bronchiectasis, tracheomalacia s/p tracheloplasty, HTN, Hx of dissection of descending thoracic aorta, hx of aortic aneurysm, Type B dissection (7/2024), medically managed initially as she did not meet criteria for surgery at that time), evaluated by Dr. Antonio, Type A dissection s/p bioAVR with Bental procedure (9/2022), severe AS s/p TAVR (9/10/2023), TIA's, DVT, Afib on Eliquis,  Colon cancer S/P resection, colostomy bag, recent dx of mrsa on sputum cx presenting to ed w/ cc of sob, cough, has been more productive, pt has been taking bactrim outpt, feeling worse, increased dyspnea, worse w/ lying down,  ill appearing,  NC/AT,  conjunctiva non conjected, sclera anicteric, moist mucous membranes, neck supple, heart sounds, normal, no mrg, lungs coarse b/l, , abd soft non distended w/ no tenderness, no visual deformities of extremities, axox3, , normal mood and affect, possible pna, vs copd exaxcerabtion, vs uri, pt ill appearing, will give iv steroids, breathing trx, cbc, cmp, ekg, pro bnp, cxr, re evaluate.

## 2025-06-09 NOTE — ED PROVIDER NOTE - CLINICAL SUMMARY MEDICAL DECISION MAKING FREE TEXT BOX
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 Patient reporting worsening cough and SOB over last 2-3 days. Patient recently dx with MRSA on sputum culture, started treatment on Bactrim. No fevers. CP/SOB. On chronic steroids. 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 Patient reporting worsening cough and SOB over last 2-3 days. Patient recently dx with MRSA on sputum culture, started treatment on Bactrim. No fevers. Also endorsing nausea and retching. On chronic steroids as well as multiple specialty nebulized medications per daughter. Received budesonide neb prior to EMS arrival.    Exam with coarse/rhonchorous breath sounds bilaterally, productive cough spitting up brown/yellow mucus which is baseline per daughter, no hemoptysis. Moaning, appears uncomfortable. A&Ox4. Ostomy well appearing with brown stool. Daughter notes stool is looser than baseline due to increased bowel reg.     Plan broad work up including labs, CXR, duonebs/IV steroids here, likely to require admission, pulm eval given failure of outpatient management for recently diagnosed MRSA infection.

## 2025-06-09 NOTE — BH CONSULTATION LIAISON PROGRESS NOTE - NSBHMSEINSIGHT_PSY_A_CORE
Please,    - Continue Metformin 1000 mgs twice a day  - Decrease glipizide to 5 mg once a day.  - Stop Januvia therapy    - Check blood glucose through [facility]estyle [place] 3 and call the [facility] to get replacement of [place] reader.    Plan for non-fasting blood work and clinical follow-up in 3 months time.    Thank you for your visit today.    If you have any questions or concerns please feel free to reach out of the office.    Fair Poor

## 2025-06-09 NOTE — H&P ADULT - ASSESSMENT
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

## 2025-06-09 NOTE — ED PROVIDER NOTE - NSICDXPASTSURGICALHX_GEN_ALL_CORE_FT
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    H/O pelvic surgery 5 years ago - s/p fracture    H/O total knee replacement, bilateral 5 years ago    History of partial hysterectomy 30 years ago - fibroids    History of sinus surgery multiple sinus surgeries    History of tracheomalacia 2015 - attempted tracheal stenting ([facility])- course complicated by obstruction, respiratory failure, multiple CPR attempts -  stent discontinued; 10/20/2016 Tracheobronchoplasty (Prolene Mesh) performed at [facility] by Dr Zapien    Rectal bleeding exam under anesthesia ([facility]) 2/2018    S/P aortic valve replacement     S/P AVR (aortic valve replacement)     S/P bronchoscopy 6/5/2018 - [place] (Dr Zapien) no evidence of tracheobronchomalacia in trachea or bronchial tubes    S/P colostomy     S/P TAVR (transcatheter aortic valve replacement)

## 2025-06-09 NOTE — ED ADULT NURSE NOTE - OBJECTIVE STATEMENT
75 y/o F AxOx4 presents to the ED complaining of shortness of breath. Patient has a past medical history of +MRSA sputum (on Bactrim), COPD, asthma  (on CPAP and VATS at home, on chronic steroids), DM2, bronchiectasis, tracheomalacia s/p tracheloplasty, HTN, Hx of dissection of descending thoracic aorta, hx of aortic aneurysm, Type B dissection (7/2024), medically managed  Type A dissection, severe AS s/p TAVR (9/10/2023), TIA's, DVT, Afib on Eliquis,  Colon cancer S/P resection, colostomy bag, neuropathy. Patient's daughter at bedside and states she is having difficulty breathing. Patient is tachypneic. 73 y/o F AxOx4 presents to the ED complaining of shortness of breath. Patient has a past medical history of +MRSA sputum (on Bactrim), COPD, asthma  (on CPAP and VATS at home, on chronic steroids), DM2, bronchiectasis, tracheomalacia s/p tracheloplasty, HTN, Hx of dissection of descending thoracic aorta, hx of aortic aneurysm, Type B dissection (7/2024), medically managed  Type A dissection, severe AS s/p TAVR (9/10/2023), TIA's, DVT, Afib on Eliquis,  Colon cancer S/P resection, colostomy bag, neuropathy. Patient's daughter at bedside and states she is having difficulty breathing. Patient is tachypneic, nausea, using accessory muscles. Patient has a patent airway, breathing is unlabored and spontaneous. Denies chest pain,blurry vision, chills, numbness, abdominal pain. Patient placed in gown, side rails up for safety, bed in lowest position, advised to ask RN if assistance is needed, patient and family educated on plan of care, comfort and safety provided.

## 2025-06-09 NOTE — ED CLERICAL - NS ED CLERK NOTE PRE-ARRIVAL INFORMATION; ADDITIONAL PRE-ARRIVAL INFORMATION
This patient is enrolled in the [facility]s Program and receives comprehensive home-based primary care.    - To obtain additional clinical information , or to discuss any questions or concerns, you can call the [facility] Calls team at [phone], 24 hours a day.    - If discharged, this patient will be followed up by the [facility] Calls team within 2 days.    - If this patient requires admission, please use the hospitalist service.

## 2025-06-09 NOTE — H&P ADULT - PROBLEM SELECTOR PLAN 3
Check Hgb A1c  Takes Lantus 22u in AM and 28u in PM.  Continue Lantus 28u QHS for now  ISS   Monitor FS

## 2025-06-09 NOTE — H&P ADULT - NSHPPHYSICALEXAM_GEN_ALL_CORE
Vital Signs Last 24 Hrs  T(C): 36.8 (09 Jun 2025 11:28), Max: 36.8 (09 Jun 2025 04:09)  T(F): 98.2 (09 Jun 2025 11:28), Max: 98.2 (09 Jun 2025 04:09)  HR: 60 (09 Jun 2025 11:28) (55 - 72)  BP: 141/62 (09 Jun 2025 11:28) (139/64 - 155/88)  BP(mean): --  RR: 20 (09 Jun 2025 11:28) (20 - 22)  SpO2: 96% (09 Jun 2025 11:28) (95% - 98%)    Parameters below as of 09 Jun 2025 11:28  Patient On (Oxygen Delivery Method): room air        CONSTITUTIONAL: NAD  EYES: No conjunctival or scleral injection, non-icteric;   ENMT: No external nasal lesions; MMM  NECK: Trachea midline   RESPIRATORY: Breathing comfortably; coarse breath sounds scattered  CARDIOVASCULAR: Irregularly irregular, +S1S2  GASTROINTESTINAL: No palpable masses or tenderness, no rebound/guarding  PSYCHIATRIC: Calm, cooperative

## 2025-06-10 ENCOUNTER — APPOINTMENT (OUTPATIENT)
Dept: ENDOCRINOLOGY | Facility: CLINIC | Age: 75
End: 2025-06-10

## 2025-06-10 LAB
ADD ON TEST-SPECIMEN IN LAB: SIGNIFICANT CHANGE UP
ALBUMIN SERPL ELPH-MCNC: 3.1 G/DL — LOW (ref 3.3–5)
ALP SERPL-CCNC: 68 U/L — SIGNIFICANT CHANGE UP (ref 40–120)
ALT FLD-CCNC: 16 U/L — SIGNIFICANT CHANGE UP (ref 10–45)
ANION GAP SERPL CALC-SCNC: 13 MMOL/L — SIGNIFICANT CHANGE UP (ref 5–17)
AST SERPL-CCNC: 15 U/L — SIGNIFICANT CHANGE UP (ref 10–40)
BASOPHILS # BLD AUTO: 0.06 K/UL — SIGNIFICANT CHANGE UP (ref 0–0.2)
BASOPHILS NFR BLD AUTO: 0.3 % — SIGNIFICANT CHANGE UP (ref 0–2)
BILIRUB SERPL-MCNC: 0.4 MG/DL — SIGNIFICANT CHANGE UP (ref 0.2–1.2)
BUN SERPL-MCNC: 16 MG/DL — SIGNIFICANT CHANGE UP (ref 7–23)
CALCIUM SERPL-MCNC: 8.6 MG/DL — SIGNIFICANT CHANGE UP (ref 8.4–10.5)
CHLORIDE SERPL-SCNC: 107 MMOL/L — SIGNIFICANT CHANGE UP (ref 96–108)
CO2 SERPL-SCNC: 18 MMOL/L — LOW (ref 22–31)
CREAT SERPL-MCNC: 0.7 MG/DL — SIGNIFICANT CHANGE UP (ref 0.5–1.3)
CRP SERPL-MCNC: 136 MG/L — HIGH (ref 0–4)
CULTURE RESULTS: SIGNIFICANT CHANGE UP
EGFR: 90 ML/MIN/1.73M2 — SIGNIFICANT CHANGE UP
EGFR: 90 ML/MIN/1.73M2 — SIGNIFICANT CHANGE UP
EOSINOPHIL # BLD AUTO: 0.05 K/UL — SIGNIFICANT CHANGE UP (ref 0–0.5)
EOSINOPHIL NFR BLD AUTO: 0.2 % — SIGNIFICANT CHANGE UP (ref 0–6)
GLUCOSE BLDC GLUCOMTR-MCNC: 139 MG/DL — HIGH (ref 70–99)
GLUCOSE BLDC GLUCOMTR-MCNC: 185 MG/DL — HIGH (ref 70–99)
GLUCOSE BLDC GLUCOMTR-MCNC: 240 MG/DL — HIGH (ref 70–99)
GLUCOSE BLDC GLUCOMTR-MCNC: 280 MG/DL — HIGH (ref 70–99)
GLUCOSE BLDC GLUCOMTR-MCNC: 86 MG/DL — SIGNIFICANT CHANGE UP (ref 70–99)
GLUCOSE SERPL-MCNC: 70 MG/DL — SIGNIFICANT CHANGE UP (ref 70–99)
GRAM STN FLD: ABNORMAL
HCT VFR BLD CALC: 36.4 % — SIGNIFICANT CHANGE UP (ref 34.5–45)
HGB BLD-MCNC: 11.5 G/DL — SIGNIFICANT CHANGE UP (ref 11.5–15.5)
IMM GRANULOCYTES NFR BLD AUTO: 0.7 % — SIGNIFICANT CHANGE UP (ref 0–0.9)
LEGIONELLA AG UR QL: NEGATIVE — SIGNIFICANT CHANGE UP
LYMPHOCYTES # BLD AUTO: 1.74 K/UL — SIGNIFICANT CHANGE UP (ref 1–3.3)
LYMPHOCYTES # BLD AUTO: 7.6 % — LOW (ref 13–44)
MCHC RBC-ENTMCNC: 24.4 PG — LOW (ref 27–34)
MCHC RBC-ENTMCNC: 31.6 G/DL — LOW (ref 32–36)
MCV RBC AUTO: 77.3 FL — LOW (ref 80–100)
METHOD TYPE: SIGNIFICANT CHANGE UP
MONOCYTES # BLD AUTO: 1.51 K/UL — HIGH (ref 0–0.9)
MONOCYTES NFR BLD AUTO: 6.6 % — SIGNIFICANT CHANGE UP (ref 2–14)
MRSA PCR RESULT.: SIGNIFICANT CHANGE UP
MRSA SPEC QL CULT: SIGNIFICANT CHANGE UP
NEUTROPHILS # BLD AUTO: 19.44 K/UL — HIGH (ref 1.8–7.4)
NEUTROPHILS NFR BLD AUTO: 84.6 % — HIGH (ref 43–77)
NRBC BLD AUTO-RTO: 0 /100 WBCS — SIGNIFICANT CHANGE UP (ref 0–0)
PLATELET # BLD AUTO: 173 K/UL — SIGNIFICANT CHANGE UP (ref 150–400)
POTASSIUM SERPL-MCNC: 4.4 MMOL/L — SIGNIFICANT CHANGE UP (ref 3.5–5.3)
POTASSIUM SERPL-SCNC: 4.4 MMOL/L — SIGNIFICANT CHANGE UP (ref 3.5–5.3)
PROT SERPL-MCNC: 5.7 G/DL — LOW (ref 6–8.3)
RBC # BLD: 4.71 M/UL — SIGNIFICANT CHANGE UP (ref 3.8–5.2)
RBC # FLD: 18 % — HIGH (ref 10.3–14.5)
S AUREUS DNA NOSE QL NAA+PROBE: DETECTED
S PNEUM AG UR QL: NEGATIVE — SIGNIFICANT CHANGE UP
SODIUM SERPL-SCNC: 138 MMOL/L — SIGNIFICANT CHANGE UP (ref 135–145)
SPECIMEN SOURCE: SIGNIFICANT CHANGE UP
WBC # BLD: 22.97 K/UL — HIGH (ref 3.8–10.5)
WBC # FLD AUTO: 22.97 K/UL — HIGH (ref 3.8–10.5)

## 2025-06-10 PROCEDURE — 99233 SBSQ HOSP IP/OBS HIGH 50: CPT

## 2025-06-10 PROCEDURE — G0545: CPT

## 2025-06-10 RX ORDER — PREDNISONE 20 MG/1
50 TABLET ORAL ONCE
Refills: 0 | Status: COMPLETED | OUTPATIENT
Start: 2025-06-10 | End: 2025-06-10

## 2025-06-10 RX ORDER — PREDNISONE 20 MG/1
50 TABLET ORAL ONCE
Refills: 0 | Status: COMPLETED | OUTPATIENT
Start: 2025-06-11 | End: 2025-06-11

## 2025-06-10 RX ORDER — DIPHENHYDRAMINE HCL 12.5MG/5ML
50 ELIXIR ORAL ONCE
Refills: 0 | Status: COMPLETED | OUTPATIENT
Start: 2025-06-10 | End: 2025-06-10

## 2025-06-10 RX ORDER — MUPIROCIN CALCIUM 20 MG/G
1 CREAM TOPICAL
Refills: 0 | Status: COMPLETED | OUTPATIENT
Start: 2025-06-10 | End: 2025-06-15

## 2025-06-10 RX ORDER — PREDNISONE 20 MG/1
50 TABLET ORAL ONCE
Refills: 0 | Status: DISCONTINUED | OUTPATIENT
Start: 2025-06-11 | End: 2025-06-11

## 2025-06-10 RX ADMIN — TIOTROPIUM BROMIDE INHALATION SPRAY 2 PUFF(S): 3.12 SPRAY, METERED RESPIRATORY (INHALATION) at 15:11

## 2025-06-10 RX ADMIN — MEXILETINE HYDROCHLORIDE 200 MILLIGRAM(S): 250 CAPSULE ORAL at 05:56

## 2025-06-10 RX ADMIN — MEXILETINE HYDROCHLORIDE 200 MILLIGRAM(S): 250 CAPSULE ORAL at 21:18

## 2025-06-10 RX ADMIN — SERTRALINE 50 MILLIGRAM(S): 100 TABLET, FILM COATED ORAL at 11:53

## 2025-06-10 RX ADMIN — BUDESONIDE 1 MILLIGRAM(S): 0.25 SUSPENSION RESPIRATORY (INHALATION) at 09:59

## 2025-06-10 RX ADMIN — MONTELUKAST SODIUM 10 MILLIGRAM(S): 10 TABLET ORAL at 21:18

## 2025-06-10 RX ADMIN — Medication 2.5 MILLIGRAM(S): at 17:19

## 2025-06-10 RX ADMIN — PREDNISONE 50 MILLIGRAM(S): 20 TABLET ORAL at 20:55

## 2025-06-10 RX ADMIN — MEXILETINE HYDROCHLORIDE 200 MILLIGRAM(S): 250 CAPSULE ORAL at 15:13

## 2025-06-10 RX ADMIN — Medication 2.5 MILLIGRAM(S): at 09:59

## 2025-06-10 RX ADMIN — Medication 1 DOSE(S): at 06:00

## 2025-06-10 RX ADMIN — Medication 2 TABLET(S): at 20:55

## 2025-06-10 RX ADMIN — LACOSAMIDE 100 MILLIGRAM(S): 150 TABLET, FILM COATED ORAL at 17:20

## 2025-06-10 RX ADMIN — Medication 250 MILLIGRAM(S): at 09:52

## 2025-06-10 RX ADMIN — Medication 40 MILLIGRAM(S): at 05:51

## 2025-06-10 RX ADMIN — PREDNISONE 40 MILLIGRAM(S): 20 TABLET ORAL at 05:51

## 2025-06-10 RX ADMIN — INSULIN GLARGINE-YFGN 28 UNIT(S): 100 INJECTION, SOLUTION SUBCUTANEOUS at 21:18

## 2025-06-10 RX ADMIN — FORMOTEROL FUMARATE DIHYDRATE 20 MICROGRAM(S): 20 SOLUTION RESPIRATORY (INHALATION) at 17:22

## 2025-06-10 RX ADMIN — Medication 60 MILLIGRAM(S): at 05:51

## 2025-06-10 RX ADMIN — INSULIN LISPRO 3: 100 INJECTION, SOLUTION INTRAVENOUS; SUBCUTANEOUS at 17:20

## 2025-06-10 RX ADMIN — Medication 1 DROP(S): at 15:15

## 2025-06-10 RX ADMIN — FORMOTEROL FUMARATE DIHYDRATE 20 MICROGRAM(S): 20 SOLUTION RESPIRATORY (INHALATION) at 10:04

## 2025-06-10 RX ADMIN — MUPIROCIN CALCIUM 1 APPLICATION(S): 20 CREAM TOPICAL at 17:19

## 2025-06-10 RX ADMIN — ERTAPENEM SODIUM 100 MILLIGRAM(S): 1 INJECTION, POWDER, LYOPHILIZED, FOR SOLUTION INTRAMUSCULAR; INTRAVENOUS at 10:06

## 2025-06-10 RX ADMIN — INSULIN LISPRO 2: 100 INJECTION, SOLUTION INTRAVENOUS; SUBCUTANEOUS at 11:54

## 2025-06-10 RX ADMIN — Medication 1 DOSE(S): at 17:19

## 2025-06-10 RX ADMIN — Medication 20 MILLIGRAM(S): at 11:54

## 2025-06-10 RX ADMIN — LEVETIRACETAM 1000 MILLIGRAM(S): 10 INJECTION, SOLUTION INTRAVENOUS at 05:51

## 2025-06-10 RX ADMIN — ROSUVASTATIN CALCIUM 5 MILLIGRAM(S): 20 TABLET, FILM COATED ORAL at 20:56

## 2025-06-10 RX ADMIN — Medication 1 DROP(S): at 05:51

## 2025-06-10 RX ADMIN — Medication 1 DROP(S): at 17:19

## 2025-06-10 RX ADMIN — Medication 50 MILLIGRAM(S): at 11:53

## 2025-06-10 RX ADMIN — BUDESONIDE 1 MILLIGRAM(S): 0.25 SUSPENSION RESPIRATORY (INHALATION) at 17:20

## 2025-06-10 RX ADMIN — Medication 1 DROP(S): at 10:00

## 2025-06-10 RX ADMIN — LACOSAMIDE 100 MILLIGRAM(S): 150 TABLET, FILM COATED ORAL at 05:51

## 2025-06-10 RX ADMIN — LABETALOL HYDROCHLORIDE 100 MILLIGRAM(S): 200 TABLET, FILM COATED ORAL at 21:18

## 2025-06-10 NOTE — PATIENT PROFILE ADULT - STAGE
PSYCHIATRY CONSULT NOTE:    REASON FOR CONSULT: overdose      HISTORY OF PRESENTING COMPLAINT:  Laurence Ross is a 46 y.o. WHITE [place]  female who is currently admitted to the ICU at [facility]. She was found unresponsive at home by her family. She denies any intentional overdose. Has repeatedly stated that she does not know what happened. She feels her mood has been pretty stable and things have been going ok. She does admit \"my life isn't perfect,\" but denies any recent changes in stressors. She denies SI/HI/AH/VH. PAST PSYCHIATRIC HISTORY and SUBSTANCE ABUSE HISTORY:  Anxiety  Depression      PAST MEDICAL HISTORY:  Please see H&P for details.    Past Medical History:   Diagnosis Date    Agitation requiring sedation protocol 7/27/2018    Altered mental status 1/18/2020    Anxiety     Arthritis     SPINAL STENOSIS, OSTEO ARTHERITIS    Asthma     CAP (community acquired pneumonia) 5/5/2018    Chronic pain     FIBROMYALGIA, SPINAL STENOSIS    Congenital plagiocephaly     Depression     Edema 11/5/2015    I see no medical indication for high dose loop diuretics     Elevated hemoglobin A1c 8/16/2014    GTT = IGT 10/2014  a1c 6.4 10/2014     Fibromyalgia     GERD (gastroesophageal reflux disease)     Hallucinations 1/18/2020    Headache     migraines    Headache(784.0)     History of completed stroke     Hypertension     Hypoglycemia     Ingestion of unknown substance 7/27/2018    Klippel-Feil syndrome     Left arm numbness 6/2/2018    Left arm swelling 6/2/2018    Memory loss     Nicotine vapor product user     Optic neuropathy     Routine Papanicolaou smear 7/29/19 neg HPV neg    SIRS (systemic inflammatory response syndrome) ([facility] [address] 4/14/2019    Spinal stenosis     Syncope 6/2/2018    TIA (transient ischemic attack) 6/29/2010    Unspecified sleep apnea     USES C-PAP           Lab Results   Component Value Date/Time    WBC 26.2 (H) 07/23/2020 12:07 AM    WBC 13. 4 (H) 05/21/2012 08:11 AM    Hemoglobin (POC) 13.3 11/16/2011 07:44 PM    HGB 13.5 07/23/2020 12:07 AM    Hematocrit (POC) 39 11/16/2011 07:44 PM    HCT 42.4 07/23/2020 12:07 AM    PLATELET 259 (H) 91/06/7043 12:07 AM    MCV 88.7 07/23/2020 12:07 AM      Lab Results   Component Value Date/Time    Sodium 140 07/23/2020 12:07 AM    Potassium 3.7 07/23/2020 12:07 AM    Chloride 104 07/23/2020 12:07 AM    CO2 27 07/23/2020 12:07 AM    Anion gap 9 07/23/2020 12:07 AM    Glucose 113 (H) 07/23/2020 12:07 AM    Glucose 94 11/29/2013 06:55 AM    BUN 14 07/23/2020 12:07 AM    Creatinine 1.29 (H) 07/23/2020 12:07 AM    BUN/Creatinine ratio 11 (L) 07/23/2020 12:07 AM    GFR est AA 52 (L) 07/23/2020 12:07 AM    GFR est non-AA 43 (L) 07/23/2020 12:07 AM    Calcium 9.1 07/23/2020 12:07 AM    Bilirubin, total 0.2 07/23/2020 12:07 AM    Alk. phosphatase 102 07/23/2020 12:07 AM    Protein, total 8.0 07/23/2020 12:07 AM    Albumin 3.9 07/23/2020 12:07 AM    Globulin 4.1 (H) 07/23/2020 12:07 AM    A-G Ratio 1.0 (L) 07/23/2020 12:07 AM    ALT (SGPT) 17 07/23/2020 12:07 AM          PSYCHOSOCIAL HISTORY:  Lives with son's father      MENTAL STATUS EXAM:    General appearance: appropriately   groomed, psychomotor activity is wnl  Eye contact: Fair  Speech: Spontaneous  Affect : Mood congruent  Mood: \"ok \"  Thought Process: Logical, goal directed  Perception: Denies AH or VH. Thought Content: Denies SI or Plan  Insight: Partial  Judgement: Fair  Cognition: Intact grossly. ASSESSMENT AND PLAN:  Kirk Baird meets criteria for a diagnosis of major depressive disorder, recurrent, without psychotic features. She denies any intentional overdose as well as any suicidal ideation.        Thank you for the consultation    Yris Braun, [id number]  July 23, 2020 suspected deep tissue injury

## 2025-06-10 NOTE — PROGRESS NOTE ADULT - SUBJECTIVE AND OBJECTIVE BOX
Patient is a 74y old  Female who presents with a chief complaint of SOB (10 Christopher 2025 14:03)    Being followed by ID for leucocytosis     Interval history:  patient seen in the ER,   Still with productive cough, hemoptysis noted   Left leg wound noted- mild purulent discharge, daughter was treating it at home with topical creams.,   Patient reports mild lower back pain. Noted to have midline lumbar spinal tenderness  has a TAVR in place ( 2023)   Has Bilateral TKA ( 20 years ago), plate and screws across pubic symphysis.     Spoke to daughter Zoe over the phone and explained the plan in detail       ROS:  No N/V/D  No abd pain  No urinary complaints  No HA  No joint or limb pain  No other complaints    Antimicrobials:  ertapenem  IVPB 1000 milliGRAM(s) IV Intermittent every 24 hours  vancomycin  IVPB 1000 milliGRAM(s) IV Intermittent every 24 hours      Vital Signs Last 24 Hrs  T(C): 36.8 (06-10-25 @ 12:49), Max: 37.3 (06-09-25 @ 22:15)  T(F): 98.3 (06-10-25 @ 12:49), Max: 99.2 (06-09-25 @ 22:15)  HR: 70 (06-10-25 @ 14:15) (46 - 74)  BP: 106/67 (06-10-25 @ 14:15) (98/62 - 130/60)  BP(mean): --  RR: 18 (06-10-25 @ 12:49) (18 - 20)  SpO2: 94% (06-10-25 @ 14:15) (93% - 100%)      PHYSICAL EXAM:  GENERAL: Awake, alert, appears uncomfortable, coughing with mucus production   \HEAD: NC/AT, neck supple, moist mucous membranes  \EYES: PERRL, EOM grossly intact, sclera anicteric  \LUNGS: coarse/rhonchorous breath sounds bilaterally,   CARDIAC: RRR, no m/r/g  ABDOMEN: Soft, non tender, ostomy site well appearing with brown stool output  EXT: Left leg open wound- purulent drainage., B/L Knee TKA  NEURO: A&Ox3. Moving all extremities without focal deficit.   SKIN: Warm and dry. No rash.    Lab Data:                        11.5   22.97 )-----------( 173      ( 10 Christopher 2025 07:28 )             36.4     06-10    138  |  107  |  16  ----------------------------<  70  4.4   |  18[L]  |  0.70    Ca    8.6      10 Christopher 2025 07:28  Mg     2.6     06-09    TPro  5.7[L]  /  Alb  3.1[L]  /  TBili  0.4  /  DBili  x   /  AST  15  /  ALT  16  /  AlkPhos  68  06-10      Sputum Sputum  06-09-25 --  --    Moderate polymorphonuclear leukocytes per low power field  Few Squamous epithelial cells per low power field  Few Gram Positive Cocci in Clusters per oil power field  Rare Gram Negative Rods per oil power field      Blood Blood-Peripheral  06-09-25   Growth in anaerobic bottle: Gram Positive Cocci in Clusters  --    Growth in anaerobic bottle: Gram Positive Cocci in Clusters    ACC: [id number] EXAM:  CT CHEST   ORDERED BY: GERRY SALAS     PROCEDURE DATE:  06/09/2025          INTERPRETATION:  CLINICAL INFORMATION: Shortness of breath. Evaluate for   pneumonia. History of bronchiectasis and tracheomalacia status post   tracheoplasty.    COMPARISON: CT chest 4/17/2025, CTA chest abdomen and pelvis 2/20/2025,   and additional chest CTs dating back to 9/22/2016.    CONTRAST/COMPLICATIONS:  IV Contrast: None  Oral Contrast: None.    PROCEDURE:  CT of the Chest was performed.  Sagittal and coronal reformats were performed.    FINDINGS:    LUNGS AND LARGE AIRWAYS: Mild central airways secretions. Multifocal   patchy and coalescent consolidative and ground-glass opacities in the   posterior right upper lobe, right middle lobe, and bilateral lower lobes.  PLEURA: Trace left pleural fluid.  HEART/VESSELS: Heart size is normal. No pericardial effusion. Status post   ascending aortic and aortic valve replacement with internal   valve-in-valve TAVR. Residual dissection involving the thoracolumbar   abdominal aorta, better evaluated on chest CTA dated 2/20/2025.  MEDIASTINUM AND MARANDA: No lymphadenopathy.  CHEST WALL AND LOWER NECK: Within normal limits.  VISUALIZED UPPER ABDOMEN: 1 cm left renal cyst. Stable partially imaged   complex left renal cyst with mild peripheral calcifications. A few stable   hypoattenuating pancreatic cystic lesions.  BONES: Sternotomy. Degenerative changes.    IMPRESSION:  Multifocal pneumonia. Recommend follow-up chest CT in 3 months to ensure   clearance.              Blood Blood-Peripheral  06-09-25   Growth in anaerobic bottle: Gram Positive Cocci in Clusters  Growth in aerobic bottle: Gram Positive Cocci in Clusters  Direct identification is available within approximately 3-5  hours either by Blood Panel Multiplexed PCR or Direct  MALDI-TOF. Details: [email or web address]  --  Blood Culture PCR                  RADIOLOGY:  below reviewed

## 2025-06-10 NOTE — PROGRESS NOTE ADULT - ASSESSMENT
Patient is a 75 yo F w/ asthma (chronic methypred 8mg PO daily), bronchiectasis, allergic rhinitis, recurrent PNA, SDB ( on CPAP), tracheomalacia s/p tracheoplasty, tracheobronchomalacia, pAfib (Eliquis), colorectal ca s/p colostomy, aortic dissection s/p ascending aorta repair who p/w cough, SOB and hemoptysis.     Outpatient sputum cultures from 6/2 growing MRSA    Now with MRSA bacteremia as well    Home Airway clearance regimen: Albuterol q6h -> Chest Vest -> Perforomist BID  -> Pulmicort BID, - > Ohtuvayre 2.5 BID - > HyperSal 7% q12h. Patient also on Breo Ellipta 200 at 1 inhalation QD, Incruse Ellipta 1 puff QD, and on Tezspire airway     #SOB - Likely 2/2 COPD exacerbation/PNA  #MRSA PNA  #MRSA bactermia  #Hemoptysis  #Acute Asthma exacerbation  #SDB - on CPAP  - c/w Airway clearance regimen as possible Albuterol q6h -> Chest Vest -> Perforomist BID (patient brought in) -> Pulmicort BID, - > Ohtuvayre 2.5 BID (patient brought in). Would hold 7% hypersal for now given reported hemoptysis, improving  - Please collect all hemoptysis at bedside in cup. If greater than 150 cc in 24 hours or greater than 100 cc in 1 hour, please call Pulm/MICU immediately. Would hold Eliquis for now, if hemoptysis worsens, can order TXA nebs q8h  - Patient also on Breo Ellipta 200 at 1 inhalation QD, Incruse Ellipta 1 puff QD. Can use Advair/Spiriva while admitted  - Tezspire outpatient   - Agree with empiric Vanc/Ertapenem for now. f/u cultures. Check TTE. f/u ID recs  - c/w home CPAP and Chest Vest for use  - Can decrease steroids back to home dose    Shaan Shah MD  Pulmonary & Critical Care

## 2025-06-10 NOTE — PHYSICAL THERAPY INITIAL EVALUATION ADULT - GENERAL OBSERVATIONS, REHAB EVAL
Pt received supine on stretcher in ED, A&0x4, on room air, +tele, +IV, +pulse ox, +purewick, following 100% multi-step commands. Pt p/w worsening cough and SOB x2-3 days.

## 2025-06-10 NOTE — PHYSICAL THERAPY INITIAL EVALUATION ADULT - PERTINENT HX OF CURRENT PROBLEM, REHAB EVAL
Pt is a 74yoF PMH Epilepsy on Keppra, COPD, asthma (on CPAP and VATS at home, on chronic steroids), DM2, bronchiectasis, tracheomalacia s/p tracheloplasty, HTN, hx dissection of descending thoracic aorta, hx of aortic aneurysm, Type B dissection (7/2024), medically managed initially as she did not meet criteria for surgery at that time), Type A dissection s/p bioAVR with Bental procedure (9/2022), severe AS s/p TAVR (9/10/2023), TIA's, DVT, Afib on Eliquis, Colon cancer s/p resection, colostomy bag, p/w worsening cough and SOB x2-3 days.   CT chest: Multifocal pneumonia.   CXR: Right patchy airspace opacity may reflect pneumonia.

## 2025-06-10 NOTE — PHYSICAL THERAPY INITIAL EVALUATION ADULT - DID THE PATIENT HAVE SURGERY?
Patient's last menstrual period was 01/01/2021.   Please reference prenatal and OB flow chart for further information  PT here today for routine prenatal care  Pt endorses fetal movement and denies loss of fluid, contractions or vaginal bleeding  Pt without complaints  ROS:  Pt denies headache, dysuria, nausea/vomiting  PE:  /64   Pulse 103   Wt 168 lb (76.2 kg)   LMP 01/01/2021   BMI 30.73 kg/m²   Gen - Alert and oriented x 3  HEENT- NC/AT, CVS - RRR, Lungs - CTAB  Abd - FH 30  Appropriate fetal growth  1hr and RPR pending  US reviewed - ceph, HOWARD 12, EFW 53% n/a

## 2025-06-10 NOTE — PROGRESS NOTE ADULT - ASSESSMENT
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 Patient reporting worsening cough and SOB over last 2-3 days. Patient recently dx with MRSA on sputum culture, started treatment on Bactrim. Reports she recently started Bactrim ds 1 BID x 10 days (6/5/2025)    Afebrile here in the ED. WBC on admission elevated to 25K. CXR on admission showing Right patchy airspace opacity may reflect pneumonia. CT chest non contrast - Multifocal pneumonia. Found to have MRSA bacteremia. Has a TAVR in place ( 2023),  Has Bilateral TKA ( 20 years ago), plate and screws across pubic symphysis.       # High grade MRSA bacteremia ( left leg open wound likely source vs PNA)   # Multifocal PNA  # Recent MRSA PNA  # Left leg open wound   # S/P TAVR in 2023   # S/P internal fixation of remote fractures of the superior pubic rami and pubic symphysis  # Bilateral TKA - 20 years ago   # Leucocytosis   # Immunocompromised host   # severe persistent asthma-steroid dependent  # bronchiectasis/COPD-   # H/o multiple infections   # Chronic cough   # H/O multiple antibiotic allergies     MICRO:   06/09 Bcx- MRSA ( 3/4)   06/09 Sputum cx-  Pending   06/09 Urine cx-   06/09 Urine strep and urine legionella-     06/02/25- Sputum cx- MRSA S- doxy, S- bactrim, S- linezolid, S- vancomycin     Abx-   Ertapenem and Vancomycin 06/09-- current     Recommendations:     - Obtain MRI lumbar spine W/wo contrast ( given bacteremia and spinal tenderness/back pain)   - Obtain a TTE, will likely need a SAFIA given TAVR   - Would obtain a CTAP w contrast to look for occult abscess given MRSA bacteremia.   - Repeat blood cx Q48 hrs until clearance is documented   - Follow sputum cx until completion   - Continue Vancomycin 1 gm Q24H for now, Please obtain V. levels tomorrow   - Monitor V. levels, AUC goal 400-600    - Can continue Ertapenem for now pending sputum cx.   - F/U urine legionella and urine strep antigen   - Please call wound care       In addition to reviewing history, imaging, documents, labs, microbiology, took into account antibiotic stewardship, local antibiogram and infection control strategies and potential transmission issues.    Discussed with the primary team. Spoke to daughter Zoe over the phone and explained the plan in detail     Mally Ramirez MD  Attending Physician, Division of Infectious Diseases  Department of Medicine   [facility]    Contact on TEAMS messaging from 9am - 5pm  Office: [phone] (after 5 PM or weekend)       74Y F complex PMH Epilepsy on Keppra, COPD, asthma  (on CPAP and VATS at home, on chronic steroids), DM2, bronchiectasis, tracheomalacia s/p tracheloplasty, HTN, Hx of dissection of descending thoracic aorta, hx of aortic aneurysm, Type B dissection (7/2024), medically managed initially as she did not meet criteria for surgery at that time), evaluated by Dr. 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 Patient reporting worsening cough and SOB over last 2-3 days. Patient recently dx with MRSA on sputum culture, started treatment on Bactrim. Reports she recently started Bactrim ds 1 BID x 10 days (6/5/2025)    Afebrile here in the ED. WBC on admission elevated to 25K. CXR on admission showing Right patchy airspace opacity may reflect pneumonia. CT chest non contrast - Multifocal pneumonia. Found to have MRSA bacteremia. Has a TAVR in place ( 2023),  Has Bilateral TKA ( 20 years ago), plate and screws across pubic symphysis.       # High grade MRSA bacteremia ( left leg open wound likely source vs PNA)   # Multifocal PNA  # Recent MRSA PNA  # Left leg open wound   # S/P TAVR in 2023   # S/P internal fixation of remote fractures of the superior pubic rami and pubic symphysis  # Bilateral TKA - 20 years ago   # Leucocytosis   # Immunocompromised host   # severe persistent asthma-steroid dependent  # bronchiectasis/COPD-   # H/o multiple infections   # Chronic cough   # H/O multiple antibiotic allergies     MICRO:   06/09 Bcx- MRSA ( 3/4)   06/09 Sputum cx-  Pending   06/09 Urine cx-   06/09 Urine strep and urine legionella-     06/02/25- Sputum cx- MRSA S- doxy, S- bactrim, S- linezolid, S- vancomycin     Abx-   Ertapenem and Vancomycin 06/09-- current     Recommendations:     - Obtain MRI lumbar spine W/wo contrast ( given bacteremia and spinal tenderness/back pain)   - Obtain a TTE, will likely need a SAFIA given TAVR   - Would obtain a CTAP w contrast to look for occult abscess given MRSA bacteremia.   - Repeat blood cx Q48 hrs until clearance is documented   - Follow sputum cx until completion   - Continue Vancomycin 1 gm Q24H for now, Please obtain V. levels tomorrow   - Monitor V. levels, AUC goal 400-600    - Can continue Ertapenem for now pending sputum cx.   - F/U urine legionella and urine strep antigen   - Please call wound care   - Check baseline ESR/CRP      In addition to reviewing history, imaging, documents, labs, microbiology, took into account antibiotic stewardship, local antibiogram and infection control strategies and potential transmission issues.    Discussed with the primary team. Spoke to daughter Zoe over the phone and explained the plan in detail     Mally Ramirez MD  Attending Physician, Division of Infectious Diseases  Department of Medicine   [facility]    Contact on TEAMS messaging from 9am - 5pm  Office: [phone] (after 5 PM or weekend)

## 2025-06-10 NOTE — PROVIDER CONTACT NOTE (CRITICAL VALUE NOTIFICATION) - ASSESSMENT
Blood culture from 6/9 shows positive blood cultures in growth in anaerobic bottle with gram positive cocci in clusters.

## 2025-06-10 NOTE — PROGRESS NOTE ADULT - SUBJECTIVE AND OBJECTIVE BOX
Josiah Frankel M.D.  Division of Hospital Medicine   Available via MS Teams    Patient is a 74y old  Female who presents with a chief complaint of SOB (09 Jun 2025 15:21)      SUBJECTIVE / OVERNIGHT EVENTS:  Patient is admitted for sepsis secondary to pneumonia  She is feeling better than yesterday.  Shortness of breath is improved.  Still has mild intermittent hemoptysis collected in the Bottle.   Also has a small open wound on the left shin.   Hemodynamics remain fairly stable overnight T-max 99°.  Noted to episodes of bradycardia with heart rate in 46, 48.   Blood cultures positive for Gram-positive cocci  Sputum stain positive for Gram-positive cocci  MRSA PCR positive  Echocardiogram ordered today.   Maintained on broad-spectrum antibiotics.  ID and pulmonology following        ADDITIONAL REVIEW OF SYSTEMS:    MEDICATIONS  (STANDING):  albuterol    0.083% 2.5 milliGRAM(s) Nebulizer every 6 hours  artificial  tears Solution 1 Drop(s) Both EYES every 4 hours  budesonide    Inhalation Suspension 1 milliGRAM(s) Inhalation two times a day  dextrose 5%. 1000 milliLiter(s) (50 mL/Hr) IV Continuous <Continuous>  dextrose 5%. 1000 milliLiter(s) (100 mL/Hr) IV Continuous <Continuous>  dextrose 50% Injectable 25 Gram(s) IV Push once  dextrose 50% Injectable 12.5 Gram(s) IV Push once  dextrose 50% Injectable 25 Gram(s) IV Push once  ertapenem  IVPB 1000 milliGRAM(s) IV Intermittent every 24 hours  famotidine    Tablet 20 milliGRAM(s) Oral daily  fluticasone propionate/ salmeterol 250-50 MICROgram(s) Diskus 1 Dose(s) Inhalation two times a day  formoterol for nebulization 20 MICROGram(s) Nebulizer every 12 hours  glucagon  Injectable 1 milliGRAM(s) IntraMuscular once  insulin glargine Injectable (LANTUS) 28 Unit(s) SubCutaneous at bedtime  insulin lispro (ADMELOG) corrective regimen sliding scale   SubCutaneous three times a day before meals  labetalol 100 milliGRAM(s) Oral every 8 hours  lacosamide 100 milliGRAM(s) Oral two times a day  levETIRAcetam 1000 milliGRAM(s) Oral two times a day  mexiletine 200 milliGRAM(s) Oral three times a day  montelukast 10 milliGRAM(s) Oral at bedtime  NIFEdipine XL 60 milliGRAM(s) Oral daily  Ohtuvayre 3 mg/2.5 mL Inhalation 3 milliGRAM(s) 3 milliGRAM(s) Nebulizer every 12 hours  pantoprazole    Tablet 40 milliGRAM(s) Oral before breakfast  predniSONE   Tablet 40 milliGRAM(s) Oral daily  rosuvastatin 5 milliGRAM(s) Oral at bedtime  senna 2 Tablet(s) Oral at bedtime  sertraline 50 milliGRAM(s) Oral daily  sodium chloride 0.9%. 1000 milliLiter(s) (75 mL/Hr) IV Continuous <Continuous>  tiotropium 2.5 MICROgram(s) Inhaler 2 Puff(s) Inhalation daily  vancomycin  IVPB 1000 milliGRAM(s) IV Intermittent every 24 hours    MEDICATIONS  (PRN):  dextrose Oral Gel 15 Gram(s) Oral once PRN Blood Glucose LESS THAN 70 milliGRAM(s)/deciliter  melatonin 3 milliGRAM(s) Oral at bedtime PRN Insomnia  ondansetron Injectable 4 milliGRAM(s) IV Push every 8 hours PRN Nausea and/or Vomiting      CAPILLARY BLOOD GLUCOSE      POCT Blood Glucose.: 240 mg/dL (10 Christopher 2025 11:09)  POCT Blood Glucose.: 86 mg/dL (10 Christopher 2025 07:17)  POCT Blood Glucose.: 114 mg/dL (09 Jun 2025 21:45)  POCT Blood Glucose.: 139 mg/dL (09 Jun 2025 17:15)    I&O's Summary      PHYSICAL EXAM:  Vital Signs Last 24 Hrs  T(C): 36.8 (10 Christopher 2025 12:49), Max: 37.3 (09 Jun 2025 22:15)  T(F): 98.3 (10 Christopher 2025 12:49), Max: 99.2 (09 Jun 2025 22:15)  HR: 71 (10 Christopher 2025 12:49) (46 - 71)  BP: 98/62 (10 Christopher 2025 12:49) (98/62 - 130/60)  BP(mean): --  RR: 18 (10 Christopher 2025 12:49) (18 - 20)  SpO2: 95% (10 Christopher 2025 12:49) (93% - 100%)    Parameters below as of 10 Christopher 2025 12:49  Patient On (Oxygen Delivery Method): room air      AO x3 in no acute distress.    Nonlabored breathing.    Decreased breath sounds bilaterally.    No crackles.  Mild rhonchi bilaterally.    Abdomen is soft nontender.    Left lower extremity small open wound with a dressing in place.      LABS:                        11.5   22.97 )-----------( 173      ( 10 Christopher 2025 07:28 )             36.4     06-10    138  |  107  |  16  ----------------------------<  70  4.4   |  18[L]  |  0.70    Ca    8.6      10 Christopher 2025 07:28  Mg     2.6     06-09    TPro  5.7[L]  /  Alb  3.1[L]  /  TBili  0.4  /  DBili  x   /  AST  15  /  ALT  16  /  AlkPhos  68  06-10          Urinalysis Basic - ( 10 Christopher 2025 07:28 )    Color: x / Appearance: x / SG: x / pH: x  Gluc: 70 mg/dL / Ketone: x  / Bili: x / Urobili: x   Blood: x / Protein: x / Nitrite: x   Leuk Esterase: x / RBC: x / WBC x   Sq Epi: x / Non Sq Epi: x / Bacteria: x        Culture - Sputum (collected 09 Jun 2025 14:19)  Source: Sputum Sputum  Gram Stain (10 Christopher 2025 00:18):    Moderate polymorphonuclear leukocytes per low power field    Few Squamous epithelial cells per low power field    Few Gram Positive Cocci in Clusters per oil power field    Rare Gram Negative Rods per oil power field    Culture - Blood (collected 09 Jun 2025 08:15)  Source: Blood Blood-Peripheral  Gram Stain (10 Christopher 2025 11:12):    Growth in anaerobic bottle: Gram Positive Cocci in Clusters  Preliminary Report (10 Christopher 2025 11:12):    Growth in anaerobic bottle: Gram Positive Cocci in Clusters    Culture - Blood (collected 09 Jun 2025 08:00)  Source: Blood Blood-Peripheral  Gram Stain (10 Christopher 2025 08:45):    Growth in anaerobic bottle: Gram Positive Cocci in Clusters    Growth in aerobic bottle: Gram Positive Cocci in Clusters  Preliminary Report (10 Christopher 2025 08:45):    Growth in anaerobic bottle: Gram Positive Cocci in Clusters    Growth in aerobic bottle: Gram Positive Cocci in Clusters    Direct identification is available within approximately 3-5    hours either by Blood Panel Multiplexed PCR or Direct    MALDI-TOF. Details: [email or web address]  Organism: Blood Culture PCR (10 Christopher 2025 07:03)  Organism: Blood Culture PCR (10 Christopher 2025 07:03)        RADIOLOGY & ADDITIONAL TESTS:  Results Reviewed:   Imaging Personally Reviewed:  Electrocardiogram Personally Reviewed:    COORDINATION OF CARE:  Care Discussed with Consultants/Other Providers [Y/N]:  Prior or Outpatient Records Reviewed [Y/N]:

## 2025-06-10 NOTE — PHYSICAL THERAPY INITIAL EVALUATION ADULT - ADDITIONAL COMMENTS
Pt resides in a private house with daughter. Pt has 0 stairs to negotiate. Pt ambulates with rolling walker at baseline and has 24/7 HHA. Pt owns shower chair and commode.

## 2025-06-10 NOTE — PROGRESS NOTE ADULT - PROBLEM SELECTOR PLAN 2
with exacerbation  Hemoptysis- mild  Home Regimen: Albuterol Q6H -> Chest Vest -> Perforomist BID  -> Pulmicort BID, - > Ohtuvayre 2.5 BID - > HyperSal 7% Q12H. Patient also on Breo Ellipta 200 at 1 inhalation QD, Incruse Ellipta 1 puff QD, and on [place] airway   Pulm recs appreciated  Continue home montelukast, albuterol Q6H, Perforomist and Ohtuvayre (patient own medication), Pulmicort. Advair/Spiriva (in place of Breo/Incruse, not on formulary). [place]sp[place] as OP  Hold hypersaline due to possible hemoptysis. Hold home glycopyrrolate in order to collect secretions for sputum culture.  Takes methylprednisolone 8mg daily at home - > increase to prednisone 40mg daily for now per pulmonology  Continue chest PT and home CPAP QHS

## 2025-06-10 NOTE — PROGRESS NOTE ADULT - PROBLEM SELECTOR PLAN 1
--Met SIRS with leukocytosis and tachpnyea (RR 22) on initial presentation. Present on admission.   --Multifocal Pneumonia on Imaging.   --Bacteremia- G+.    Blood cultures positive for Gram-positive cocci. Will need Rpt blood cx in am.  Sputum stain positive for Gram-positive cocci  MRSA PCR positive  Echocardiogram ordered today.   Maintained on broad-spectrum antibiotics.  ID and pulmonology following  Patient with many antibiotic allergies - continue ertapenem and Vanco for now

## 2025-06-10 NOTE — PROGRESS NOTE ADULT - SUBJECTIVE AND OBJECTIVE BOX
CHIEF COMPLAINT: SOB    Interval Events: Feels breathing is improved. Now with MRSA bacteremia    REVIEW OF SYSTEMS:  Constitutional: [ ] negative [ ] fevers [ ] chills [ ] weight loss [ ] weight gain  HEENT: [ ] negative [ ] dry eyes [ ] eye irritation [ ] postnasal drip [ ] nasal congestion  CV: [ ] negative  [ ] chest pain [ ] orthopnea [ ] palpitations [ ] murmur  Resp: [ ] negative [X] cough [X] shortness of breath [ ] dyspnea [ ] wheezing [ ] sputum [ ] hemoptysis  GI: [ ] negative [ ] nausea [ ] vomiting [ ] diarrhea [ ] constipation [ ] abd pain [ ] dysphagia   : [ ] negative [ ] dysuria [ ] nocturia [ ] hematuria [ ] increased urinary frequency  Musculoskeletal: [ ] negative [ ] back pain [ ] myalgias [ ] arthralgias [ ] fracture  Skin: [ ] negative [ ] rash [ ] itch  Neurological: [ ] negative [ ] headache [ ] dizziness [ ] syncope [ ] weakness [ ] numbness  Psychiatric: [ ] negative [ ] anxiety [ ] depression  Endocrine: [ ] negative [ ] diabetes [ ] thyroid problem  Hematologic/Lymphatic: [ ] negative [ ] anemia [ ] bleeding problem  Allergic/Immunologic: [ ] negative [ ] itchy eyes [ ] nasal discharge [ ] hives [ ] angioedema  [X] All other systems negative  [ ] Unable to assess ROS because ________    OBJECTIVE:  ICU Vital Signs Last 24 Hrs  T(C): 36.8 (10 Christopher 2025 12:49), Max: 37.3 (09 Jun 2025 22:15)  T(F): 98.3 (10 Christopher 2025 12:49), Max: 99.2 (09 Jun 2025 22:15)  HR: 74 (10 Christopher 2025 13:15) (46 - 74)  BP: 98/62 (10 Christopher 2025 12:49) (98/62 - 130/60)  BP(mean): --  ABP: --  ABP(mean): --  RR: 18 (10 Christopher 2025 12:49) (18 - 20)  SpO2: 98% (10 Christopher 2025 13:15) (93% - 100%)    O2 Parameters below as of 10 Christopher 2025 12:49  Patient On (Oxygen Delivery Method): room air              CAPILLARY BLOOD GLUCOSE      POCT Blood Glucose.: 240 mg/dL (10 Christopher 2025 11:09)      PHYSICAL EXAM:  General: NAD  HEENT: EOMI, PERRLA, Exophthalmos  Neck: Supple  Respiratory: Scattered rhochi  Cardiovascular: S1S2, irregular  Abdomen: Soft, NTND, BS+  Extremities: No edema  Skin: Warm and dry, Bandage on L shin  Neurological: No gross focal deficits      HOSPITAL MEDICATIONS:  MEDICATIONS  (STANDING):  albuterol    0.083% 2.5 milliGRAM(s) Nebulizer every 6 hours  artificial  tears Solution 1 Drop(s) Both EYES every 4 hours  budesonide    Inhalation Suspension 1 milliGRAM(s) Inhalation two times a day  chlorhexidine 2% Cloths 1 Application(s) Topical daily  dextrose 5%. 1000 milliLiter(s) (50 mL/Hr) IV Continuous <Continuous>  dextrose 5%. 1000 milliLiter(s) (100 mL/Hr) IV Continuous <Continuous>  dextrose 50% Injectable 25 Gram(s) IV Push once  dextrose 50% Injectable 12.5 Gram(s) IV Push once  dextrose 50% Injectable 25 Gram(s) IV Push once  ertapenem  IVPB 1000 milliGRAM(s) IV Intermittent every 24 hours  famotidine    Tablet 20 milliGRAM(s) Oral daily  fluticasone propionate/ salmeterol 250-50 MICROgram(s) Diskus 1 Dose(s) Inhalation two times a day  formoterol for nebulization 20 MICROGram(s) Nebulizer every 12 hours  glucagon  Injectable 1 milliGRAM(s) IntraMuscular once  insulin glargine Injectable (LANTUS) 28 Unit(s) SubCutaneous at bedtime  insulin lispro (ADMELOG) corrective regimen sliding scale   SubCutaneous three times a day before meals  labetalol 100 milliGRAM(s) Oral every 8 hours  lacosamide 100 milliGRAM(s) Oral two times a day  levETIRAcetam 1000 milliGRAM(s) Oral two times a day  mexiletine 200 milliGRAM(s) Oral three times a day  montelukast 10 milliGRAM(s) Oral at bedtime  mupirocin 2% Nasal 1 Application(s) Both Nostrils two times a day  NIFEdipine XL 60 milliGRAM(s) Oral daily  Ohtuvayre 3 mg/2.5 mL Inhalation 3 milliGRAM(s) 3 milliGRAM(s) Nebulizer every 12 hours  pantoprazole    Tablet 40 milliGRAM(s) Oral before breakfast  predniSONE   Tablet 40 milliGRAM(s) Oral daily  rosuvastatin 5 milliGRAM(s) Oral at bedtime  senna 2 Tablet(s) Oral at bedtime  sertraline 50 milliGRAM(s) Oral daily  sodium chloride 0.9%. 1000 milliLiter(s) (75 mL/Hr) IV Continuous <Continuous>  tiotropium 2.5 MICROgram(s) Inhaler 2 Puff(s) Inhalation daily  vancomycin  IVPB 1000 milliGRAM(s) IV Intermittent every 24 hours    MEDICATIONS  (PRN):  dextrose Oral Gel 15 Gram(s) Oral once PRN Blood Glucose LESS THAN 70 milliGRAM(s)/deciliter  melatonin 3 milliGRAM(s) Oral at bedtime PRN Insomnia  ondansetron Injectable 4 milliGRAM(s) IV Push every 8 hours PRN Nausea and/or Vomiting      LABS:                        11.5   22.97 )-----------( 173      ( 10 Christopher 2025 07:28 )             36.4     Hgb Trend: 11.5<--, 12.6<--  06-10    138  |  107  |  16  ----------------------------<  70  4.4   |  18[L]  |  0.70    Ca    8.6      10 Christopher 2025 07:28  Mg     2.6     06-09    TPro  5.7[L]  /  Alb  3.1[L]  /  TBili  0.4  /  DBili  x   /  AST  15  /  ALT  16  /  AlkPhos  68  06-10    Creatinine Trend: 0.70<--, 0.90<--    Urinalysis Basic - ( 10 Christopher 2025 07:28 )    Color: x / Appearance: x / SG: x / pH: x  Gluc: 70 mg/dL / Ketone: x  / Bili: x / Urobili: x   Blood: x / Protein: x / Nitrite: x   Leuk Esterase: x / RBC: x / WBC x   Sq Epi: x / Non Sq Epi: x / Bacteria: x        Venous Blood Gas:  06-09 @ 11:02  --/--/--/--/--  VBG Lactate: 1.8  Venous Blood Gas:  06-09 @ 07:55  7.38/40/64/24/90.9  VBG Lactate: 2.7      MICROBIOLOGY:     Culture - Sputum (collected 09 Jun 2025 14:19)  Source: Sputum Sputum  Gram Stain (10 Christopher 2025 00:18):    Moderate polymorphonuclear leukocytes per low power field    Few Squamous epithelial cells per low power field    Few Gram Positive Cocci in Clusters per oil power field    Rare Gram Negative Rods per oil power field    Culture - Blood (collected 09 Jun 2025 08:15)  Source: Blood Blood-Peripheral  Gram Stain (10 Christopher 2025 11:12):    Growth in anaerobic bottle: Gram Positive Cocci in Clusters  Preliminary Report (10 Christopher 2025 11:12):    Growth in anaerobic bottle: Gram Positive Cocci in Clusters    Culture - Blood (collected 09 Jun 2025 08:00)  Source: Blood Blood-Peripheral  Gram Stain (10 Christopher 2025 08:45):    Growth in anaerobic bottle: Gram Positive Cocci in Clusters    Growth in aerobic bottle: Gram Positive Cocci in Clusters  Preliminary Report (10 Christopher 2025 08:45):    Growth in anaerobic bottle: Gram Positive Cocci in Clusters    Growth in aerobic bottle: Gram Positive Cocci in Clusters    Direct identification is available within approximately 3-5    hours either by Blood Panel Multiplexed PCR or Direct    MALDI-TOF. Details: [email or web address]  Organism: Blood Culture PCR (10 Christopher 2025 07:03)  Organism: Blood Culture PCR (10 Christopher 2025 07:03)        RADIOLOGY:  [ ] Reviewed and interpreted by me    PULMONARY FUNCTION TESTS:    EKG:

## 2025-06-11 ENCOUNTER — RESULT REVIEW (OUTPATIENT)
Age: 75
End: 2025-06-11

## 2025-06-11 DIAGNOSIS — K59.00 CONSTIPATION, UNSPECIFIED: ICD-10-CM

## 2025-06-11 LAB
-  CLINDAMYCIN: SIGNIFICANT CHANGE UP
-  ERYTHROMYCIN: SIGNIFICANT CHANGE UP
-  GENTAMICIN: SIGNIFICANT CHANGE UP
-  LINEZOLID: SIGNIFICANT CHANGE UP
-  OXACILLIN: SIGNIFICANT CHANGE UP
-  PENICILLIN: SIGNIFICANT CHANGE UP
-  RIFAMPIN: SIGNIFICANT CHANGE UP
-  TETRACYCLINE: SIGNIFICANT CHANGE UP
-  TRIMETHOPRIM/SULFAMETHOXAZOLE: SIGNIFICANT CHANGE UP
-  VANCOMYCIN: SIGNIFICANT CHANGE UP
ANION GAP SERPL CALC-SCNC: 13 MMOL/L — SIGNIFICANT CHANGE UP (ref 5–17)
BUN SERPL-MCNC: 19 MG/DL — SIGNIFICANT CHANGE UP (ref 7–23)
CALCIUM SERPL-MCNC: 9 MG/DL — SIGNIFICANT CHANGE UP (ref 8.4–10.5)
CHLORIDE SERPL-SCNC: 102 MMOL/L — SIGNIFICANT CHANGE UP (ref 96–108)
CO2 SERPL-SCNC: 20 MMOL/L — LOW (ref 22–31)
CREAT SERPL-MCNC: 0.59 MG/DL — SIGNIFICANT CHANGE UP (ref 0.5–1.3)
CULTURE RESULTS: ABNORMAL
EGFR: 93 ML/MIN/1.73M2 — SIGNIFICANT CHANGE UP
EGFR: 93 ML/MIN/1.73M2 — SIGNIFICANT CHANGE UP
ERYTHROCYTE [SEDIMENTATION RATE] IN BLOOD: 32 MM/HR — HIGH (ref 0–20)
GLUCOSE BLDC GLUCOMTR-MCNC: 159 MG/DL — HIGH (ref 70–99)
GLUCOSE BLDC GLUCOMTR-MCNC: 262 MG/DL — HIGH (ref 70–99)
GLUCOSE BLDC GLUCOMTR-MCNC: 306 MG/DL — HIGH (ref 70–99)
GLUCOSE BLDC GLUCOMTR-MCNC: 310 MG/DL — HIGH (ref 70–99)
GLUCOSE SERPL-MCNC: 322 MG/DL — HIGH (ref 70–99)
HCT VFR BLD CALC: 39.3 % — SIGNIFICANT CHANGE UP (ref 34.5–45)
HGB BLD-MCNC: 12.3 G/DL — SIGNIFICANT CHANGE UP (ref 11.5–15.5)
MCHC RBC-ENTMCNC: 24.6 PG — LOW (ref 27–34)
MCHC RBC-ENTMCNC: 31.3 G/DL — LOW (ref 32–36)
MCV RBC AUTO: 78.8 FL — LOW (ref 80–100)
METHOD TYPE: SIGNIFICANT CHANGE UP
NRBC BLD AUTO-RTO: 0 /100 WBCS — SIGNIFICANT CHANGE UP (ref 0–0)
ORGANISM # SPEC MICROSCOPIC CNT: ABNORMAL
ORGANISM # SPEC MICROSCOPIC CNT: ABNORMAL
PLATELET # BLD AUTO: 185 K/UL — SIGNIFICANT CHANGE UP (ref 150–400)
POTASSIUM SERPL-MCNC: 4.9 MMOL/L — SIGNIFICANT CHANGE UP (ref 3.5–5.3)
POTASSIUM SERPL-SCNC: 4.9 MMOL/L — SIGNIFICANT CHANGE UP (ref 3.5–5.3)
RBC # BLD: 4.99 M/UL — SIGNIFICANT CHANGE UP (ref 3.8–5.2)
RBC # FLD: 17.3 % — HIGH (ref 10.3–14.5)
SODIUM SERPL-SCNC: 135 MMOL/L — SIGNIFICANT CHANGE UP (ref 135–145)
SPECIMEN SOURCE: SIGNIFICANT CHANGE UP
VANCOMYCIN TROUGH SERPL-MCNC: 4.5 UG/ML — LOW (ref 10–20)
WBC # BLD: 17.63 K/UL — HIGH (ref 3.8–10.5)
WBC # FLD AUTO: 17.63 K/UL — HIGH (ref 3.8–10.5)

## 2025-06-11 PROCEDURE — G0545: CPT

## 2025-06-11 PROCEDURE — 72158 MRI LUMBAR SPINE W/O & W/DYE: CPT | Mod: 26

## 2025-06-11 PROCEDURE — 99233 SBSQ HOSP IP/OBS HIGH 50: CPT

## 2025-06-11 PROCEDURE — 93306 TTE W/DOPPLER COMPLETE: CPT | Mod: 26

## 2025-06-11 PROCEDURE — 99221 1ST HOSP IP/OBS SF/LOW 40: CPT

## 2025-06-11 RX ORDER — VANCOMYCIN HCL IN 5 % DEXTROSE 1.5G/250ML
1000 PLASTIC BAG, INJECTION (ML) INTRAVENOUS EVERY 24 HOURS
Refills: 0 | Status: DISCONTINUED | OUTPATIENT
Start: 2025-06-11 | End: 2025-06-11

## 2025-06-11 RX ORDER — DIPHENHYDRAMINE HCL 12.5MG/5ML
50 ELIXIR ORAL ONCE
Refills: 0 | Status: COMPLETED | OUTPATIENT
Start: 2025-06-11 | End: 2025-06-11

## 2025-06-11 RX ORDER — MAGNESIUM HYDROXIDE 400 MG/5ML
30 SUSPENSION ORAL DAILY
Refills: 0 | Status: DISCONTINUED | OUTPATIENT
Start: 2025-06-11 | End: 2025-06-21

## 2025-06-11 RX ORDER — SILVER SULFADIAZINE 1 %
1 CREAM (GRAM) TOPICAL DAILY
Refills: 0 | Status: DISCONTINUED | OUTPATIENT
Start: 2025-06-11 | End: 2025-06-21

## 2025-06-11 RX ORDER — APIXABAN 2.5 MG/1
5 TABLET, FILM COATED ORAL
Refills: 0 | Status: DISCONTINUED | OUTPATIENT
Start: 2025-06-11 | End: 2025-06-21

## 2025-06-11 RX ORDER — MINERAL OIL
30 OIL (ML) MISCELLANEOUS DAILY
Refills: 0 | Status: DISCONTINUED | OUTPATIENT
Start: 2025-06-11 | End: 2025-06-21

## 2025-06-11 RX ORDER — PREDNISONE 20 MG/1
50 TABLET ORAL ONCE
Refills: 0 | Status: COMPLETED | OUTPATIENT
Start: 2025-06-12 | End: 2025-06-12

## 2025-06-11 RX ORDER — VANCOMYCIN HCL IN 5 % DEXTROSE 1.5G/250ML
750 PLASTIC BAG, INJECTION (ML) INTRAVENOUS EVERY 12 HOURS
Refills: 0 | Status: DISCONTINUED | OUTPATIENT
Start: 2025-06-12 | End: 2025-06-15

## 2025-06-11 RX ORDER — INSULIN LISPRO 100 U/ML
INJECTION, SOLUTION INTRAVENOUS; SUBCUTANEOUS
Refills: 0 | Status: DISCONTINUED | OUTPATIENT
Start: 2025-06-11 | End: 2025-06-13

## 2025-06-11 RX ORDER — LACTULOSE 10 G/15ML
10 SOLUTION ORAL DAILY
Refills: 0 | Status: DISCONTINUED | OUTPATIENT
Start: 2025-06-11 | End: 2025-06-21

## 2025-06-11 RX ORDER — LANOLIN/MINERAL OIL/PETROLATUM
1 OINTMENT (GRAM) OPHTHALMIC (EYE) THREE TIMES A DAY
Refills: 0 | Status: DISCONTINUED | OUTPATIENT
Start: 2025-06-11 | End: 2025-06-11

## 2025-06-11 RX ORDER — DIPHENHYDRAMINE HCL 12.5MG/5ML
50 ELIXIR ORAL ONCE
Refills: 0 | Status: COMPLETED | OUTPATIENT
Start: 2025-06-12 | End: 2025-06-12

## 2025-06-11 RX ORDER — INSULIN GLARGINE-YFGN 100 [IU]/ML
32 INJECTION, SOLUTION SUBCUTANEOUS AT BEDTIME
Refills: 0 | Status: DISCONTINUED | OUTPATIENT
Start: 2025-06-11 | End: 2025-06-15

## 2025-06-11 RX ORDER — ERTAPENEM SODIUM 1 G/1
1000 INJECTION, POWDER, LYOPHILIZED, FOR SOLUTION INTRAMUSCULAR; INTRAVENOUS EVERY 24 HOURS
Refills: 0 | Status: DISCONTINUED | OUTPATIENT
Start: 2025-06-11 | End: 2025-06-11

## 2025-06-11 RX ORDER — PREDNISONE 20 MG/1
50 TABLET ORAL ONCE
Refills: 0 | Status: DISCONTINUED | OUTPATIENT
Start: 2025-06-11 | End: 2025-06-11

## 2025-06-11 RX ADMIN — INSULIN LISPRO 8: 100 INJECTION, SOLUTION INTRAVENOUS; SUBCUTANEOUS at 17:35

## 2025-06-11 RX ADMIN — Medication 50 MILLIGRAM(S): at 13:33

## 2025-06-11 RX ADMIN — LACOSAMIDE 100 MILLIGRAM(S): 150 TABLET, FILM COATED ORAL at 18:15

## 2025-06-11 RX ADMIN — Medication 1 DROP(S): at 18:16

## 2025-06-11 RX ADMIN — Medication 2.5 MILLIGRAM(S): at 05:26

## 2025-06-11 RX ADMIN — Medication 2.5 MILLIGRAM(S): at 13:05

## 2025-06-11 RX ADMIN — MEXILETINE HYDROCHLORIDE 200 MILLIGRAM(S): 250 CAPSULE ORAL at 21:11

## 2025-06-11 RX ADMIN — Medication 40 MILLIGRAM(S): at 05:25

## 2025-06-11 RX ADMIN — BUDESONIDE 1 MILLIGRAM(S): 0.25 SUSPENSION RESPIRATORY (INHALATION) at 18:19

## 2025-06-11 RX ADMIN — BUDESONIDE 1 MILLIGRAM(S): 0.25 SUSPENSION RESPIRATORY (INHALATION) at 05:26

## 2025-06-11 RX ADMIN — Medication 1 DOSE(S): at 18:16

## 2025-06-11 RX ADMIN — Medication 1 APPLICATION(S): at 13:11

## 2025-06-11 RX ADMIN — Medication 2 TABLET(S): at 21:12

## 2025-06-11 RX ADMIN — Medication 4 MILLILITER(S): at 18:17

## 2025-06-11 RX ADMIN — PREDNISONE 50 MILLIGRAM(S): 20 TABLET ORAL at 01:05

## 2025-06-11 RX ADMIN — Medication 1 DROP(S): at 21:09

## 2025-06-11 RX ADMIN — Medication 1 DROP(S): at 05:31

## 2025-06-11 RX ADMIN — INSULIN GLARGINE-YFGN 32 UNIT(S): 100 INJECTION, SOLUTION SUBCUTANEOUS at 21:10

## 2025-06-11 RX ADMIN — Medication 30 MILLILITER(S): at 18:17

## 2025-06-11 RX ADMIN — SERTRALINE 50 MILLIGRAM(S): 100 TABLET, FILM COATED ORAL at 13:06

## 2025-06-11 RX ADMIN — LABETALOL HYDROCHLORIDE 100 MILLIGRAM(S): 200 TABLET, FILM COATED ORAL at 05:25

## 2025-06-11 RX ADMIN — ERTAPENEM SODIUM 100 MILLIGRAM(S): 1 INJECTION, POWDER, LYOPHILIZED, FOR SOLUTION INTRAMUSCULAR; INTRAVENOUS at 13:05

## 2025-06-11 RX ADMIN — Medication 1 APPLICATION(S): at 18:20

## 2025-06-11 RX ADMIN — Medication 1 DROP(S): at 13:16

## 2025-06-11 RX ADMIN — FORMOTEROL FUMARATE DIHYDRATE 20 MICROGRAM(S): 20 SOLUTION RESPIRATORY (INHALATION) at 08:59

## 2025-06-11 RX ADMIN — LABETALOL HYDROCHLORIDE 100 MILLIGRAM(S): 200 TABLET, FILM COATED ORAL at 21:11

## 2025-06-11 RX ADMIN — INSULIN LISPRO 8: 100 INJECTION, SOLUTION INTRAVENOUS; SUBCUTANEOUS at 13:33

## 2025-06-11 RX ADMIN — Medication 3 MILLIGRAM(S): at 21:13

## 2025-06-11 RX ADMIN — LEVETIRACETAM 1000 MILLIGRAM(S): 10 INJECTION, SOLUTION INTRAVENOUS at 18:16

## 2025-06-11 RX ADMIN — MUPIROCIN CALCIUM 1 APPLICATION(S): 20 CREAM TOPICAL at 05:26

## 2025-06-11 RX ADMIN — ROSUVASTATIN CALCIUM 5 MILLIGRAM(S): 20 TABLET, FILM COATED ORAL at 21:12

## 2025-06-11 RX ADMIN — TIOTROPIUM BROMIDE INHALATION SPRAY 2 PUFF(S): 3.12 SPRAY, METERED RESPIRATORY (INHALATION) at 13:05

## 2025-06-11 RX ADMIN — Medication 250 MILLIGRAM(S): at 13:05

## 2025-06-11 RX ADMIN — Medication 1 DOSE(S): at 05:27

## 2025-06-11 RX ADMIN — MEXILETINE HYDROCHLORIDE 200 MILLIGRAM(S): 250 CAPSULE ORAL at 05:25

## 2025-06-11 RX ADMIN — MEXILETINE HYDROCHLORIDE 200 MILLIGRAM(S): 250 CAPSULE ORAL at 13:10

## 2025-06-11 RX ADMIN — APIXABAN 5 MILLIGRAM(S): 2.5 TABLET, FILM COATED ORAL at 18:15

## 2025-06-11 RX ADMIN — Medication 2.5 MILLIGRAM(S): at 18:16

## 2025-06-11 RX ADMIN — FORMOTEROL FUMARATE DIHYDRATE 20 MICROGRAM(S): 20 SOLUTION RESPIRATORY (INHALATION) at 18:25

## 2025-06-11 RX ADMIN — Medication 60 MILLIGRAM(S): at 05:25

## 2025-06-11 RX ADMIN — INSULIN LISPRO 3: 100 INJECTION, SOLUTION INTRAVENOUS; SUBCUTANEOUS at 07:58

## 2025-06-11 RX ADMIN — LACOSAMIDE 100 MILLIGRAM(S): 150 TABLET, FILM COATED ORAL at 05:26

## 2025-06-11 RX ADMIN — Medication 20 MILLIGRAM(S): at 13:06

## 2025-06-11 RX ADMIN — LEVETIRACETAM 1000 MILLIGRAM(S): 10 INJECTION, SOLUTION INTRAVENOUS at 05:25

## 2025-06-11 NOTE — DIETITIAN INITIAL EVALUATION ADULT - PERTINENT MEDS FT
MEDICATIONS  (STANDING):  albuterol    0.083% 2.5 milliGRAM(s) Nebulizer every 6 hours  apixaban 5 milliGRAM(s) Oral two times a day  artificial  tears Solution 1 Drop(s) Both EYES every 4 hours  budesonide    Inhalation Suspension 1 milliGRAM(s) Inhalation two times a day  chlorhexidine 2% Cloths 1 Application(s) Topical daily  dextrose 5%. 1000 milliLiter(s) (100 mL/Hr) IV Continuous <Continuous>  dextrose 5%. 1000 milliLiter(s) (50 mL/Hr) IV Continuous <Continuous>  dextrose 50% Injectable 25 Gram(s) IV Push once  dextrose 50% Injectable 12.5 Gram(s) IV Push once  dextrose 50% Injectable 25 Gram(s) IV Push once  famotidine    Tablet 20 milliGRAM(s) Oral daily  fluticasone propionate/ salmeterol 250-50 MICROgram(s) Diskus 1 Dose(s) Inhalation two times a day  formoterol for nebulization 20 MICROGram(s) Nebulizer every 12 hours  glucagon  Injectable 1 milliGRAM(s) IntraMuscular once  insulin glargine Injectable (LANTUS) 32 Unit(s) SubCutaneous at bedtime  insulin lispro (ADMELOG) corrective regimen sliding scale   SubCutaneous three times a day before meals  labetalol 100 milliGRAM(s) Oral every 8 hours  lacosamide 100 milliGRAM(s) Oral two times a day  levETIRAcetam 1000 milliGRAM(s) Oral two times a day  mexiletine 200 milliGRAM(s) Oral three times a day  mineral oil 30 milliLiter(s) Oral daily  montelukast 10 milliGRAM(s) Oral at bedtime  mupirocin 2% Nasal 1 Application(s) Both Nostrils two times a day  NIFEdipine XL 60 milliGRAM(s) Oral daily  Ohtuvayre 3 mg/2.5 mL Inhalation 3 milliGRAM(s) 3 milliGRAM(s) Nebulizer every 12 hours  pantoprazole    Tablet 40 milliGRAM(s) Oral before breakfast  predniSONE   Tablet 40 milliGRAM(s) Oral daily  rosuvastatin 5 milliGRAM(s) Oral at bedtime  senna 2 Tablet(s) Oral at bedtime  sertraline 50 milliGRAM(s) Oral daily  silver sulfADIAZINE 1% Cream 1 Application(s) Topical daily  sodium chloride 0.9%. 1000 milliLiter(s) (75 mL/Hr) IV Continuous <Continuous>  sodium chloride 7% Inhalation 4 milliLiter(s) Inhalation every 12 hours  tiotropium 2.5 MICROgram(s) Inhaler 2 Puff(s) Inhalation daily    MEDICATIONS  (PRN):  dextrose Oral Gel 15 Gram(s) Oral once PRN Blood Glucose LESS THAN 70 milliGRAM(s)/deciliter  lactulose Syrup 10 Gram(s) Oral daily PRN constipation  magnesium hydroxide Suspension 30 milliLiter(s) Oral daily PRN Constipation  melatonin 3 milliGRAM(s) Oral at bedtime PRN Insomnia  ondansetron Injectable 4 milliGRAM(s) IV Push every 8 hours PRN Nausea and/or Vomiting

## 2025-06-11 NOTE — PROGRESS NOTE ADULT - ASSESSMENT
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 Antonio, Type A dissection s/p bioAVR with Bental procedure (9/2022), severe AS s/p TAVR (9/10/2023), TIA's, DVT, Afib on Eliquis, Colon cancer S/P resection w/ colostomy bag, neuropathy, recent hospitalization (04/2025) for hallucinations, falls presents with cough and shortness of breath.

## 2025-06-11 NOTE — DIETITIAN INITIAL EVALUATION ADULT - LITERATURE/VIDEOS GIVEN
Patient not in room during attempted visits. Unable to provide diet education at this time. Diet education handouts left at the patient's bedside. RD remains available to provide diet education as indicated.

## 2025-06-11 NOTE — PROGRESS NOTE ADULT - TIME BILLING
review of labs, imaging, notes, discussion of plan with patient, family, and consultant reviewing documentation, reviewing and interpreting labs/imaging, interviewing and examining patient, discussing plan of care with patient and daughter on the phone, documentation, coordinating care with ACP

## 2025-06-11 NOTE — CONSULT NOTE ADULT - ASSESSMENT
73 yo F with a PMH of Epilepsy on Keppra/lacosamide, COPD, asthma  (on CPAP at home, on chronic steroids), DM2, bronchiectasis, tracheomalacia s/p tracheloplasty, HTN, Hx of dissection of descending thoracic aorta, hx of aortic aneurysm, Type B dissection (7/2024), medically managed initially as she did not meet criteria for surgery at that time), evaluated by Dr. 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    She noted worsening productive cough. Also with N/V- non-bloody, non-bilious. No fevers at home. She was recently started on Bactrim by her outpatient pulmonologist, Dr. Allison, for MRSA in sputum. Now on IV abx per ID.     A/P:    Wound Consult requested to assist w/ management of elbow/sacral/shin/heel wounds      BLE elevation, offload sacrum  Abx per Medicine/ ID for PNA, add topical biofilm control tx- Iodosorb gel BID/cover w/gauze.  Moisturize intact skin w/ SWEEN cream BID  Nutrition Consult for optimization in pt w/ Severe Protein Calorie Malnutrition,  Encourage high quality protein, kimberly/ prosource, MVI & Vit C to promote wound healing- patient on Prednisone  Avoid Hyperglycemia - improving w/ ADA diet and Lantus/ & FS w/ ISS  Continue turning and positioning w/ offloading assistive devices as per protocol  Buttocks/ Sacrum Neptali/ /CAVILON ADVANCE TIW and prn   Continue w/ attends under pads and Pericare w/ purewick care as per protocol  Waffle Cushion to chair when oob to chair  Continue w/ low air loss pressure redistribution bed surface     Care as per medicine, will follow w/ you/ remain available as requested  Upon discharge f/u as outpatient at [facility] [phone]  D/w team & RN  Thank you for this consult  Hilda Story, JOHANNAP-BC, CWON,  [id number]  Nights/ Weekends/ Holidays please call:  General Surgery Consult pager ([phone]) for emergencies  Wound PT for multilayer leg wrapping or VAC issues ([phone])    73 yo F with a PMH of Epilepsy on Keppra/lacosamide, COPD, asthma  (on CPAP at home, on chronic steroids), DM2, bronchiectasis, tracheomalacia s/p tracheloplasty, HTN, Hx of dissection of descending thoracic aorta, hx of aortic aneurysm, Type B dissection (7/2024), medically managed initially as she did not meet criteria for surgery at that time), evaluated by Dr. 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    She noted worsening productive cough. Also with N/V- non-bloody, non-bilious. No fevers at home. She was recently started on Bactrim by her outpatient pulmonologist, Dr. Allison, for MRSA in sputum. Now on IV abx per ID.     A/P:    Wound Consult requested to assist w/ management of elbow/sacral/shin/heel wounds      BLE elevation, offload sacrum  Abx per Medicine/ ID for PNA, add topical biofilm control tx- silvadene daily/cover w/gauze.  Moisturize intact skin w/ SWEEN cream BID  Nutrition Consult for optimization in pt w/ Severe Protein Calorie Malnutrition,  Encourage high quality protein, kimberly/ prosource, MVI & Vit C to promote wound healing- patient on Prednisone  Avoid Hyperglycemia - improving w/ ADA diet and Lantus/ & FS w/ ISS  Continue turning and positioning w/ offloading assistive devices as per protocol  Buttocks/ Sacrum Neptali/ /CAVILON ADVANCE TIW and prn   Continue w/ attends under pads and Pericare w/ purewick care as per protocol  Waffle Cushion to chair when oob to chair  Continue w/ low air loss pressure redistribution bed surface     Care as per medicine, will follow w/ you/ remain available as requested  Upon discharge f/u as outpatient at [facility] [address] [facility] [phone]  D/w team & RN  Thank you for this consult  Hilda Story, JOHANNAP-BC, CWON,  [phone]  Nights/ Weekends/ Holidays please call:  General Surgery Consult pager ([phone]) for emergencies  Wound PT for multilayer leg wrapping or VAC issues ([phone])    A.P. 73 yo F with a PMH of Epilepsy on Keppra/lacosamide, COPD, asthma  (on CPAP at home, on chronic steroids), DM2, bronchiectasis, tracheomalacia s/p tracheloplasty, HTN, Hx of dissection of descending thoracic aorta, hx of aortic aneurysm, Type B dissection (7/2024), medically managed initially as she did not meet criteria for surgery at that time), evaluated by Dr. 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    She noted worsening productive cough. Also with N/V- non-bloody, non-bilious. No fevers at home. She was recently started on Bactrim by her outpatient pulmonologist, Dr. Allison, for MRSA in sputum. Now on IV abx per ID.         Wound Consult requested to assist w/ management of     Rt. elbow contracted hyperpigmented  Sacral hyperpigmented, scar tissue  Lt shin wound  Lt heel callous     Recommendations:   Lt elbow cavilon daily  Cavilon daily Neptali BID and PRN  Lt shin silvadene daily  Lt heel callous DPM continue aquaphor      BLE elevation,   Offload sacrum  Abx per ID   Moisturize intact skin w/ SWEEN cream BID  Nutrition Consult high quality protein, kimberly/ prosource, MVI & Vit C to promote wound healing  Avoid Hyperglycemia - improving w/ ADA diet and Lantus/ & FS w/ ISS  Continue turning and positioning w/ offloading assistive devices as per protocol  Continue w/ attends under pads and Pericare  as per protocol  Waffle Cushion to chair when oob to chair  Continue w/ low air loss pressure redistribution bed surface     Care as per medicine, will remain available as requested  If needed upon discharge f/u as outpatient at [facility] [address] [facility] [phone]  D/w team & RN  Thank you for this consult  Hilda Story, CHIARA-BC, CWON,  [phone]  Nights/ Weekends/ Holidays please call:  General Surgery Consult pager ([phone]) for emergencies  Wound PT for multilayer leg wrapping or VAC issues ([phone])

## 2025-06-11 NOTE — DIETITIAN INITIAL EVALUATION ADULT - NSICDXPASTSURGICALHX_GEN_ALL_CORE_FT
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    H/O pelvic surgery 5 years ago - s/p fracture    H/O total knee replacement, bilateral 5 years ago    History of partial hysterectomy 30 years ago - fibroids    History of sinus surgery multiple sinus surgeries    History of tracheomalacia 2015 - attempted tracheal stenting ([facility])- course complicated by obstruction, respiratory failure, multiple CPR attempts -  stent discontinued; 10/20/2016 Tracheobronchoplasty (Prolene Mesh) performed at [facility] by Dr Zapien    Rectal bleeding exam under anesthesia ([facility]) 2/2018    S/P aortic valve replacement     S/P AVR (aortic valve replacement)     S/P bronchoscopy 6/5/2018 - [place] (Dr Zapien) no evidence of tracheobronchomalacia in trachea or bronchial tubes    S/P colostomy     S/P TAVR (transcatheter aortic valve replacement)

## 2025-06-11 NOTE — CONSULT NOTE ADULT - SUBJECTIVE AND OBJECTIVE BOX
Wound SURGERY CONSULT NOTE    HPI:  73 yo F with a PMH of Epilepsy on Keppra/lacosamide, COPD, asthma  (on CPAP at home, on chronic steroids), DM2, bronchiectasis, tracheomalacia s/p tracheloplasty, HTN, Hx of dissection of descending thoracic aorta, hx of aortic aneurysm, Type B dissection (7/2024), medically managed initially as she did not meet criteria for surgery at that time), evaluated by Dr. 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   She reports that patient intermittently confused since most recent hospitalization.  She noted worsening productive cough. Also with N/V- non-bloody, non-bilious. No fevers at home. She was recently started on Bactrim by her outpatient pulmonologist, Dr. Allison, for MRSA in sputum. Now on IV abx per ID.         Wound consult requested by team to assist w/ management and evaluation of left shin wound/left heel/ and B/L elbow skin changes. Pt sans c/o pain, however left shin wound per patient reporting -is progressing from small scab to now open wound productive of tan drainage, sans odor. Silvadene in use x days. Sacrum with prior wound, now scar tissue and appearing healed w/hyperpigmentation. Tender to palpation/pressure/loading. Offloading and pericare initiated upon admission. Pt is continent of urine/colostomy in place. No h/o bites, scratches, falls, trauma. Appetite good. RD to follow. All questions asked and answered to pt's expressed understanding and satisfaction.    Current Diet: Diet, Regular:   Consistent Carbohydrate No Snacks (CSTCHO)  DASH/TLC Sodium & Cholesterol Restricted (DASH)  No Shellfish  Supplement Feeding Modality:  Oral  Glucerna Shake Cans or Servings Per Day:  2       Frequency:  Daily (06-10-25 @ 13:38)      PAST MEDICAL & SURGICAL HISTORY:  Seizure  x 1 1/7/18      DVT (deep venous thrombosis)  15-20 years ago, took coumadin      TIA (transient ischemic attack)  multiple, last 5 years ago - presents as right-sided weakness      COPD (chronic obstructive pulmonary disease)      Atrial fibrillation      History of COPD      Afib      Aortic valve replaced      Epilepsy      Asthma      DM (diabetes mellitus)      History of seizure disorder      History of TIAs      HTN (hypertension)      Bronchiectasis      Colon cancer      History of partial hysterectomy  30 years ago - fibroids      H/O total knee replacement, bilateral  5 years ago      History of sinus surgery  multiple sinus surgeries      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      H/O pelvic surgery  5 years ago - s/p fracture      History of tracheomalacia  2015 - attempted tracheal stenting ([facility])- course complicated by obstruction, respiratory failure, multiple CPR attempts -  stent discontinued; 10/20/2016 Tracheobronchoplasty (Prolene Mesh) performed at [facility] by Dr Zapien      S/P bronchoscopy  6/5/2018 - [facility] (Dr Zapien) no evidence of tracheobronchomalacia in trachea or bronchial tubes      Rectal bleeding  exam under anesthesia ([facility]) 2/2018      S/P TAVR (transcatheter aortic valve replacement)      S/P aortic valve replacement      S/P colostomy      S/P AVR (aortic valve replacement)          REVIEW OF SYSTEMS:   General/ Breast/ Skin/Vasc/ Neuro/ MSK: see HPI  All other systems negative    MEDICATIONS  (STANDING):  albuterol    0.083% 2.5 milliGRAM(s) Nebulizer every 6 hours  apixaban 5 milliGRAM(s) Oral two times a day  artificial  tears Solution 1 Drop(s) Both EYES every 4 hours  budesonide    Inhalation Suspension 1 milliGRAM(s) Inhalation two times a day  chlorhexidine 2% Cloths 1 Application(s) Topical daily  dextrose 5%. 1000 milliLiter(s) (50 mL/Hr) IV Continuous <Continuous>  dextrose 5%. 1000 milliLiter(s) (100 mL/Hr) IV Continuous <Continuous>  dextrose 50% Injectable 25 Gram(s) IV Push once  dextrose 50% Injectable 12.5 Gram(s) IV Push once  dextrose 50% Injectable 25 Gram(s) IV Push once  ertapenem  IVPB 1000 milliGRAM(s) IV Intermittent every 24 hours  famotidine    Tablet 20 milliGRAM(s) Oral daily  fluticasone propionate/ salmeterol 250-50 MICROgram(s) Diskus 1 Dose(s) Inhalation two times a day  formoterol for nebulization 20 MICROGram(s) Nebulizer every 12 hours  glucagon  Injectable 1 milliGRAM(s) IntraMuscular once  insulin glargine Injectable (LANTUS) 32 Unit(s) SubCutaneous at bedtime  insulin lispro (ADMELOG) corrective regimen sliding scale   SubCutaneous three times a day before meals  labetalol 100 milliGRAM(s) Oral every 8 hours  lacosamide 100 milliGRAM(s) Oral two times a day  levETIRAcetam 1000 milliGRAM(s) Oral two times a day  mexiletine 200 milliGRAM(s) Oral three times a day  montelukast 10 milliGRAM(s) Oral at bedtime  mupirocin 2% Nasal 1 Application(s) Both Nostrils two times a day  NIFEdipine XL 60 milliGRAM(s) Oral daily  Ohtuvayre 3 mg/2.5 mL Inhalation 3 milliGRAM(s) 3 milliGRAM(s) Nebulizer every 12 hours  pantoprazole    Tablet 40 milliGRAM(s) Oral before breakfast  predniSONE   Tablet 40 milliGRAM(s) Oral daily  rosuvastatin 5 milliGRAM(s) Oral at bedtime  senna 2 Tablet(s) Oral at bedtime  sertraline 50 milliGRAM(s) Oral daily  sodium chloride 0.9%. 1000 milliLiter(s) (75 mL/Hr) IV Continuous <Continuous>  tiotropium 2.5 MICROgram(s) Inhaler 2 Puff(s) Inhalation daily    MEDICATIONS  (PRN):  dextrose Oral Gel 15 Gram(s) Oral once PRN Blood Glucose LESS THAN 70 milliGRAM(s)/deciliter  melatonin 3 milliGRAM(s) Oral at bedtime PRN Insomnia  ondansetron Injectable 4 milliGRAM(s) IV Push every 8 hours PRN Nausea and/or Vomiting      Allergies    Valium (Short breath)  OxyContin (Unknown)  Valium (Unknown)  Avelox (Short breath; Pruritus)  penicillin (Anaphylaxis)  Dilaudid (Short breath)  shellfish (Anaphylaxis)  ampicillin (Unknown)  codeine (Short breath)  Percocet (Unknown)  tetanus toxoid (Short breath)  cefepime (Anaphylaxis)  cefepime (Short breath (Severe))  codeine (Unknown)  iodine (Short breath; Swelling)  aspirin (Short breath)  meropenem (Unknown)    Intolerances        SOCIAL HISTORY: (+)HHA/ lives in SNF; Former smoker, No current/ Denies smoking, ETOH, drugs    FAMILY HISTORY:  Family history of asthma    Family history of breast cancer (Sibling)    Family history of diabetes mellitus type II     no h/o PVD or wound healing or skin/ significant problems    PHYSICAL EXAM:  Vital Signs Last 24 Hrs  T(C): 36.7 (11 Jun 2025 13:03), Max: 37.2 (10 Christopher 2025 16:36)  T(F): 98 (11 Jun 2025 13:03), Max: 98.9 (10 Christopher 2025 16:36)  HR: 55 (11 Jun 2025 13:03) (55 - 74)  BP: 108/60 (11 Jun 2025 13:03) (107/60 - 133/74)  BP(mean): --  RR: 18 (11 Jun 2025 13:03) (18 - 18)  SpO2: 97% (11 Jun 2025 13:03) (93% - 97%)    Parameters below as of 11 Jun 2025 13:03  Patient On (Oxygen Delivery Method): room air        NAD, A&Ox3/pleasant  Total Care Sport/ Versa Care P500 / Envella Progressa bed  HEENT:  sclera clear, mucosa moist, trachea midline, neck supple, right eye blind/prostethic eye  Respiratory: nonlabored w/ equal chest rise, moist cough  Gastrointestinal: soft NT/ND (+)ostomy w/formed brown stool-self care  : purewick  Neurology:  weakened strength & sensation grossly intact, alert oriented and follows commands well, motor/extremities at least against gravity  Psych: calm/ appropriate  Musculoskeletal: limited stiff/ROM  Vascular: BLE equally warm, no cyanosis, clubbing, edema           BLE DP/PT pulses palpable       no BLE hemosiderin staining  Skin:  moist, thinned  Elbows B/L hyperpigmented, right elbow w/scar tissue/ epithelized. Sacrum w/hyperpigmented, sl tender area, blanchable, scar tissue visible. Left shin with full thickness oval open area approx 1cm diameter, tan mucoid drainage on gauze, wound base red/mucoid/no granulation visible/no bleeding/edges raised. No slough, perimeter erythema, no calor/no odor. Left heel with small/approx 1cm rounded callous.       LABS/ CULTURES/ RADIOLOGY:                        12.3   17.63 )-----------( 185      ( 11 Jun 2025 09:50 )             39.3       135  |  102  |  19  ----------------------------<  322      [06-11-25 @ 09:50]  4.9   |  20  |  0.59        Ca     9.0     [06-11-25 @ 09:50]    TPro  5.7  /  Alb  3.1  /  TBili  0.4  /  DBili  x   /  AST  15  /  ALT  16  /  AlkPhos  68  [06-10-25 @ 07:28]            A1C with Estimated Average Glucose Result: 8.5 % (05-07-25 @ 13:48)  A1C with Estimated Average Glucose Result: 8.0 % (04-18-25 @ 10:27)  A1C with Estimated Average Glucose Result: 7.7 % (02-21-25 @ 08:41)        Culture - Blood (collected 06-09-25 @ 08:15)  Source: Blood Blood-Peripheral  Gram Stain (06-10-25 @ 11:12):    Growth in anaerobic bottle: Gram Positive Cocci in Clusters  Preliminary Report (06-10-25 @ 11:12):    Growth in anaerobic bottle: Gram Positive Cocci in Clusters    Culture - Blood (collected 06-09-25 @ 08:00)  Source: Blood Blood-Peripheral  Gram Stain (06-10-25 @ 08:45):    Growth in anaerobic bottle: Gram Positive Cocci in Clusters    Growth in aerobic bottle: Gram Positive Cocci in Clusters  Preliminary Report (06-10-25 @ 08:45):    Growth in anaerobic bottle: Gram Positive Cocci in Clusters    Growth in aerobic bottle: Gram Positive Cocci in Clusters    Direct identification is available within approximately 3-5    hours either by Blood Panel Multiplexed PCR or Direct    MALDI-TOF. Details: [email or web address]  Organism: Blood Culture PCR (06-10-25 @ 07:03)  Organism: Blood Culture PCR (06-10-25 @ 07:03)      Method Type: PCR      -  Methicillin resistat Staphylococcus aureus (MRSA): Detec   Wound SURGERY CONSULT NOTE    HPI:  75 yo F with a PMH of Epilepsy on Keppra/lacosamide, COPD, asthma  (on CPAP at home, on chronic steroids), DM2, bronchiectasis, tracheomalacia s/p tracheloplasty, HTN, Hx of dissection of descending thoracic aorta, hx of aortic aneurysm, Type B dissection (7/2024), medically managed initially as she did not meet criteria for surgery at that time), evaluated by Dr. 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   She reports that patient intermittently confused since most recent hospitalization.  She noted worsening productive cough. Also with N/V- non-bloody, non-bilious. No fevers at home. She was recently started on Bactrim by her outpatient pulmonologist, Dr. Allison, for MRSA in sputum. Now on IV abx per ID.         Wound consult requested by team to assist w/ management and evaluation of left shin wound/left heel/ and B/L elbow skin changes. Pt w/o c/o pain, however left shin wound per patient reporting -is progressing from small scab to now open wound productive of tan drainage, w/o odor. Silvadene in use x days. Sacrum with prior wound, now scar tissue and appearing healed w/hyperpigmentation. Tender to palpation/pressure/loading. Offloading and pericare initiated upon admission. Pt is continent of urine/colostomy in place. No h/o bites, scratches, falls, trauma. Appetite good. RD to follow. All questions asked and answered to pt's expressed understanding and satisfaction.    Current Diet: Diet, Regular:   Consistent Carbohydrate No Snacks (CSTCHO)  DASH/TLC Sodium & Cholesterol Restricted (DASH)  No Shellfish  Supplement Feeding Modality:  Oral  Glucerna Shake Cans or Servings Per Day:  2       Frequency:  Daily (06-10-25 @ 13:38)      PAST MEDICAL & SURGICAL HISTORY:  Seizure  x 1 1/7/18      DVT (deep venous thrombosis)  15-20 years ago, took coumadin      TIA (transient ischemic attack)  multiple, last 5 years ago - presents as right-sided weakness      COPD (chronic obstructive pulmonary disease)      Atrial fibrillation      History of COPD      Afib      Aortic valve replaced      Epilepsy      Asthma      DM (diabetes mellitus)      History of seizure disorder      History of TIAs      HTN (hypertension)      Bronchiectasis      Colon cancer      History of partial hysterectomy  30 years ago - fibroids      H/O total knee replacement, bilateral  5 years ago      History of sinus surgery  multiple sinus surgeries      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      H/O pelvic surgery  5 years ago - s/p fracture      History of tracheomalacia  2015 - attempted tracheal stenting ([facility])- course complicated by obstruction, respiratory failure, multiple CPR attempts -  stent discontinued; 10/20/2016 Tracheobronchoplasty (Prolene Mesh) performed at [facility] by Dr Zapien      S/P bronchoscopy  6/5/2018 - [facility] (Dr Zapien) no evidence of tracheobronchomalacia in trachea or bronchial tubes      Rectal bleeding  exam under anesthesia ([facility]) 2/2018      S/P TAVR (transcatheter aortic valve replacement)      S/P aortic valve replacement      S/P colostomy      S/P AVR (aortic valve replacement)          REVIEW OF SYSTEMS:   General/ Breast/ Skin/Vasc/ Neuro/ MSK: see HPI  All other systems negative    MEDICATIONS  (STANDING):  albuterol    0.083% 2.5 milliGRAM(s) Nebulizer every 6 hours  apixaban 5 milliGRAM(s) Oral two times a day  artificial  tears Solution 1 Drop(s) Both EYES every 4 hours  budesonide    Inhalation Suspension 1 milliGRAM(s) Inhalation two times a day  chlorhexidine 2% Cloths 1 Application(s) Topical daily  dextrose 5%. 1000 milliLiter(s) (50 mL/Hr) IV Continuous <Continuous>  dextrose 5%. 1000 milliLiter(s) (100 mL/Hr) IV Continuous <Continuous>  dextrose 50% Injectable 25 Gram(s) IV Push once  dextrose 50% Injectable 12.5 Gram(s) IV Push once  dextrose 50% Injectable 25 Gram(s) IV Push once  ertapenem  IVPB 1000 milliGRAM(s) IV Intermittent every 24 hours  famotidine    Tablet 20 milliGRAM(s) Oral daily  fluticasone propionate/ salmeterol 250-50 MICROgram(s) Diskus 1 Dose(s) Inhalation two times a day  formoterol for nebulization 20 MICROGram(s) Nebulizer every 12 hours  glucagon  Injectable 1 milliGRAM(s) IntraMuscular once  insulin glargine Injectable (LANTUS) 32 Unit(s) SubCutaneous at bedtime  insulin lispro (ADMELOG) corrective regimen sliding scale   SubCutaneous three times a day before meals  labetalol 100 milliGRAM(s) Oral every 8 hours  lacosamide 100 milliGRAM(s) Oral two times a day  levETIRAcetam 1000 milliGRAM(s) Oral two times a day  mexiletine 200 milliGRAM(s) Oral three times a day  montelukast 10 milliGRAM(s) Oral at bedtime  mupirocin 2% Nasal 1 Application(s) Both Nostrils two times a day  NIFEdipine XL 60 milliGRAM(s) Oral daily  Ohtuvayre 3 mg/2.5 mL Inhalation 3 milliGRAM(s) 3 milliGRAM(s) Nebulizer every 12 hours  pantoprazole    Tablet 40 milliGRAM(s) Oral before breakfast  predniSONE   Tablet 40 milliGRAM(s) Oral daily  rosuvastatin 5 milliGRAM(s) Oral at bedtime  senna 2 Tablet(s) Oral at bedtime  sertraline 50 milliGRAM(s) Oral daily  sodium chloride 0.9%. 1000 milliLiter(s) (75 mL/Hr) IV Continuous <Continuous>  tiotropium 2.5 MICROgram(s) Inhaler 2 Puff(s) Inhalation daily    MEDICATIONS  (PRN):  dextrose Oral Gel 15 Gram(s) Oral once PRN Blood Glucose LESS THAN 70 milliGRAM(s)/deciliter  melatonin 3 milliGRAM(s) Oral at bedtime PRN Insomnia  ondansetron Injectable 4 milliGRAM(s) IV Push every 8 hours PRN Nausea and/or Vomiting      Allergies    Valium (Short breath)  OxyContin (Unknown)  Valium (Unknown)  Avelox (Short breath; Pruritus)  penicillin (Anaphylaxis)  Dilaudid (Short breath)  shellfish (Anaphylaxis)  ampicillin (Unknown)  codeine (Short breath)  Percocet (Unknown)  tetanus toxoid (Short breath)  cefepime (Anaphylaxis)  cefepime (Short breath (Severe))  codeine (Unknown)  iodine (Short breath; Swelling)  aspirin (Short breath)  meropenem (Unknown)    Intolerances        SOCIAL HISTORY:     No hx of Smoke, ETOH, Drugs    FAMILY HISTORY:    Family history of asthma    Family history of breast cancer (Sibling)    Family history of diabetes mellitus type II      PHYSICAL EXAM:  Vital Signs Last 24 Hrs  T(C): 36.7 (11 Jun 2025 13:03), Max: 37.2 (10 Christopher 2025 16:36)  T(F): 98 (11 Jun 2025 13:03), Max: 98.9 (10 Christopher 2025 16:36)  HR: 55 (11 Jun 2025 13:03) (55 - 74)  BP: 108/60 (11 Jun 2025 13:03) (107/60 - 133/74)  BP(mean): --  RR: 18 (11 Jun 2025 13:03) (18 - 18)  SpO2: 97% (11 Jun 2025 13:03) (93% - 97%)    Parameters below as of 11 Jun 2025 13:03  Patient On (Oxygen Delivery Method): room air        NAD, A&Ox3/pleasant  Versa Care P500  bed  HEENT:  sclera clear, mucosa moist, trachea midline, neck supple, right eye blind/prostethic eye  Respiratory: nonlabored w/ equal chest rise, moist cough  Gastrointestinal: soft NT/ND (+)ostomy w/formed brown stool-self care  : purewick  Neurology:  weakened strength & sensation grossly intact, alert oriented and follows commands well, motor/extremities at least against gravity  Psych: calm/ appropriate  Musculoskeletal: limited stiff/ROM  Vascular: BLE equally warm, no cyanosis, clubbing, edema           BLE DP/PT pulses palpable       no BLE hemosiderin staining  Skin:  moist, thinned  Elbows B/L hyperpigmented, right elbow w/scar tissue/ epithelized.     Sacrum w/hyperpigmented, sl tender area, blanchable, scar tissue visible.     Left shin with full thickness oval open area 1.0 cm x 1.0cm x 0.2 cm,  tan mucoid drainage on gauze, wound base red/mucoid/no granulation visible/no bleeding/edges raised.        No slough, periwound erythema, no calor/no odor. no induration, no fluctuance, no crepitus    Left heel with small/approx 1cm rounded callous. Dry no drainage      LABS/ CULTURES/ RADIOLOGY:                        12.3   17.63 )-----------( 185      ( 11 Jun 2025 09:50 )             39.3       135  |  102  |  19  ----------------------------<  322      [06-11-25 @ 09:50]  4.9   |  20  |  0.59        Ca     9.0     [06-11-25 @ 09:50]    TPro  5.7  /  Alb  3.1  /  TBili  0.4  /  DBili  x   /  AST  15  /  ALT  16  /  AlkPhos  68  [06-10-25 @ 07:28]            A1C with Estimated Average Glucose Result: 8.5 % (05-07-25 @ 13:48)  A1C with Estimated Average Glucose Result: 8.0 % (04-18-25 @ 10:27)  A1C with Estimated Average Glucose Result: 7.7 % (02-21-25 @ 08:41)        Culture - Blood (collected 06-09-25 @ 08:15)  Source: Blood Blood-Peripheral  Gram Stain (06-10-25 @ 11:12):    Growth in anaerobic bottle: Gram Positive Cocci in Clusters  Preliminary Report (06-10-25 @ 11:12):    Growth in anaerobic bottle: Gram Positive Cocci in Clusters    Culture - Blood (collected 06-09-25 @ 08:00)  Source: Blood Blood-Peripheral  Gram Stain (06-10-25 @ 08:45):    Growth in anaerobic bottle: Gram Positive Cocci in Clusters    Growth in aerobic bottle: Gram Positive Cocci in Clusters  Preliminary Report (06-10-25 @ 08:45):    Growth in anaerobic bottle: Gram Positive Cocci in Clusters    Growth in aerobic bottle: Gram Positive Cocci in Clusters    Direct identification is available within approximately 3-5    hours either by Blood Panel Multiplexed PCR or Direct    MALDI-TOF. Details: [email or web address]  Organism: Blood Culture PCR (06-10-25 @ 07:03)  Organism: Blood Culture PCR (06-10-25 @ 07:03)      Method Type: PCR      -  Methicillin resistat Staphylococcus aureus (MRSA): Detec

## 2025-06-11 NOTE — DIETITIAN INITIAL EVALUATION ADULT - PERTINENT LABORATORY DATA
06-11    135  |  102  |  19  ----------------------------<  322[H]  4.9   |  20[L]  |  0.59    Ca    9.0      11 Jun 2025 09:50    TPro  5.7[L]  /  Alb  3.1[L]  /  TBili  0.4  /  DBili  x   /  AST  15  /  ALT  16  /  AlkPhos  68  06-10  POCT Blood Glucose.: 310 mg/dL (06-11-25 @ 13:00)  A1C with Estimated Average Glucose Result: 8.5 % (05-07-25 @ 13:48)  A1C with Estimated Average Glucose Result: 8.0 % (04-18-25 @ 10:27)  A1C with Estimated Average Glucose Result: 7.7 % (02-21-25 @ 08:41)

## 2025-06-11 NOTE — DIETITIAN INITIAL EVALUATION ADULT - OTHER INFO
Pertinent History:   PMH: T2DM, Tracheomalacia s/p tracheloplasty, Colon CA s/p resection     Pertinent Nutrition Related Labs:  - POCT  (H), Glucose 322 (H). Hyperglycemia noted during admission, recent fingersticks: 86-280mg/dL (6/09-6/11). HbA1c 8.5% (5/07)  - BNP 1109 (H) 6/09    Pertinent Medications:  - Steroid in use, may elevate blood glucose   - Insulin (ADMELOG)  - Zofran prn

## 2025-06-11 NOTE — DIETITIAN INITIAL EVALUATION ADULT - NSFNSNUTRHOMESUPPLEMENTFT_GEN_A_CORE
Per H&P, patient ordered for Lantus, Ferrous Sulfate, Prednisone, Multivitamin, Ascorbic Acid, Cranberry, Lactulose, Megestrol prior to admission.

## 2025-06-11 NOTE — DIETITIAN INITIAL EVALUATION ADULT - PHYSCIAL ASSESSMENT
- [facility] weight history not available.     Per RD note from recent previous admission (4/18/2025), patient's weight documented to be 145lbs, and height 5'7". Per chart, patient's height documented to be 5'0" and dosing weight 155lbs this admission. Question for possible discrepancies.

## 2025-06-11 NOTE — DIETITIAN INITIAL EVALUATION ADULT - ENERGY INTAKE
Per nursing flowsheets, fair p.o. intake noted today (6/11), 51-75%. Lunch tray observed at bedside, 0% consumed./Fair (50-75%)

## 2025-06-11 NOTE — PROGRESS NOTE ADULT - SUBJECTIVE AND OBJECTIVE BOX
CHIEF COMPLAINT: SOB    Interval Events: No acute events overnight. Improved hemoptysis.    REVIEW OF SYSTEMS:  Constitutional: [ ] negative [ ] fevers [ ] chills [ ] weight loss [ ] weight gain  HEENT: [ ] negative [ ] dry eyes [ ] eye irritation [ ] postnasal drip [ ] nasal congestion  CV: [ ] negative  [X] chest pain [ ] orthopnea [ ] palpitations [ ] murmur  Resp: [ ] negative [X] cough [X] shortness of breath [ ] dyspnea [ ] wheezing [X] sputum [X] hemoptysis  GI: [ ] negative [ ] nausea [ ] vomiting [ ] diarrhea [ ] constipation [ ] abd pain [ ] dysphagia   : [ ] negative [ ] dysuria [ ] nocturia [ ] hematuria [ ] increased urinary frequency  Musculoskeletal: [ ] negative [ ] back pain [ ] myalgias [ ] arthralgias [ ] fracture  Skin: [ ] negative [ ] rash [ ] itch  Neurological: [ ] negative [ ] headache [ ] dizziness [ ] syncope [ ] weakness [ ] numbness  Psychiatric: [ ] negative [ ] anxiety [ ] depression  Endocrine: [ ] negative [ ] diabetes [ ] thyroid problem  Hematologic/Lymphatic: [ ] negative [ ] anemia [ ] bleeding problem  Allergic/Immunologic: [ ] negative [ ] itchy eyes [ ] nasal discharge [ ] hives [ ] angioedema  [X] All other systems negative  [ ] Unable to assess ROS because ________    OBJECTIVE:  ICU Vital Signs Last 24 Hrs  T(C): 36.7 (11 Jun 2025 13:03), Max: 37.2 (10 Christopher 2025 16:36)  T(F): 98 (11 Jun 2025 13:03), Max: 98.9 (10 Christopher 2025 16:36)  HR: 55 (11 Jun 2025 13:03) (55 - 74)  BP: 108/60 (11 Jun 2025 13:03) (106/67 - 133/74)  BP(mean): --  ABP: --  ABP(mean): --  RR: 18 (11 Jun 2025 13:03) (18 - 18)  SpO2: 97% (11 Jun 2025 13:03) (93% - 97%)    O2 Parameters below as of 11 Jun 2025 13:03  Patient On (Oxygen Delivery Method): room air              06-10 @ 07:01  -  06-11 @ 07:00  --------------------------------------------------------  IN: 120 mL / OUT: 575 mL / NET: -455 mL    06-11 @ 07:01  -  06-11 @ 13:30  --------------------------------------------------------  IN: 240 mL / OUT: 0 mL / NET: 240 mL      CAPILLARY BLOOD GLUCOSE      POCT Blood Glucose.: 310 mg/dL (11 Jun 2025 13:00)      PHYSICAL EXAM:  General: NAD  HEENT: EOMI, PERRLA, Exophthalmos  Neck: Supple  Respiratory: Scattered rhochi  Cardiovascular: S1S2, irregular  Abdomen: Soft, NTND, BS+  Extremities: No edema  Skin: Warm and dry, Bandage on L shin  Neurological: No gross focal deficits    HOSPITAL MEDICATIONS:  MEDICATIONS  (STANDING):  albuterol    0.083% 2.5 milliGRAM(s) Nebulizer every 6 hours  apixaban 5 milliGRAM(s) Oral two times a day  artificial  tears Solution 1 Drop(s) Both EYES every 4 hours  budesonide    Inhalation Suspension 1 milliGRAM(s) Inhalation two times a day  chlorhexidine 2% Cloths 1 Application(s) Topical daily  dextrose 5%. 1000 milliLiter(s) (50 mL/Hr) IV Continuous <Continuous>  dextrose 5%. 1000 milliLiter(s) (100 mL/Hr) IV Continuous <Continuous>  dextrose 50% Injectable 25 Gram(s) IV Push once  dextrose 50% Injectable 12.5 Gram(s) IV Push once  dextrose 50% Injectable 25 Gram(s) IV Push once  diphenhydrAMINE 50 milliGRAM(s) Oral once  ertapenem  IVPB 1000 milliGRAM(s) IV Intermittent every 24 hours  famotidine    Tablet 20 milliGRAM(s) Oral daily  fluticasone propionate/ salmeterol 250-50 MICROgram(s) Diskus 1 Dose(s) Inhalation two times a day  formoterol for nebulization 20 MICROGram(s) Nebulizer every 12 hours  glucagon  Injectable 1 milliGRAM(s) IntraMuscular once  insulin glargine Injectable (LANTUS) 32 Unit(s) SubCutaneous at bedtime  insulin lispro (ADMELOG) corrective regimen sliding scale   SubCutaneous three times a day before meals  labetalol 100 milliGRAM(s) Oral every 8 hours  lacosamide 100 milliGRAM(s) Oral two times a day  levETIRAcetam 1000 milliGRAM(s) Oral two times a day  mexiletine 200 milliGRAM(s) Oral three times a day  montelukast 10 milliGRAM(s) Oral at bedtime  mupirocin 2% Nasal 1 Application(s) Both Nostrils two times a day  NIFEdipine XL 60 milliGRAM(s) Oral daily  Ohtuvayre 3 mg/2.5 mL Inhalation 3 milliGRAM(s) 3 milliGRAM(s) Nebulizer every 12 hours  pantoprazole    Tablet 40 milliGRAM(s) Oral before breakfast  predniSONE   Tablet 50 milliGRAM(s) Oral once  predniSONE   Tablet 40 milliGRAM(s) Oral daily  rosuvastatin 5 milliGRAM(s) Oral at bedtime  senna 2 Tablet(s) Oral at bedtime  sertraline 50 milliGRAM(s) Oral daily  sodium chloride 0.9%. 1000 milliLiter(s) (75 mL/Hr) IV Continuous <Continuous>  tiotropium 2.5 MICROgram(s) Inhaler 2 Puff(s) Inhalation daily  vancomycin  IVPB 1000 milliGRAM(s) IV Intermittent every 24 hours    MEDICATIONS  (PRN):  dextrose Oral Gel 15 Gram(s) Oral once PRN Blood Glucose LESS THAN 70 milliGRAM(s)/deciliter  melatonin 3 milliGRAM(s) Oral at bedtime PRN Insomnia  ondansetron Injectable 4 milliGRAM(s) IV Push every 8 hours PRN Nausea and/or Vomiting      LABS:                        12.3   17.63 )-----------( 185      ( 11 Jun 2025 09:50 )             39.3     Hgb Trend: 12.3<--, 11.5<--, 12.6<--  06-11    135  |  102  |  19  ----------------------------<  322[H]  4.9   |  20[L]  |  0.59    Ca    9.0      11 Jun 2025 09:50    TPro  5.7[L]  /  Alb  3.1[L]  /  TBili  0.4  /  DBili  x   /  AST  15  /  ALT  16  /  AlkPhos  68  06-10    Creatinine Trend: 0.59<--, 0.70<--, 0.90<--    Urinalysis Basic - ( 11 Jun 2025 09:50 )    Color: x / Appearance: x / SG: x / pH: x  Gluc: 322 mg/dL / Ketone: x  / Bili: x / Urobili: x   Blood: x / Protein: x / Nitrite: x   Leuk Esterase: x / RBC: x / WBC x   Sq Epi: x / Non Sq Epi: x / Bacteria: x            MICROBIOLOGY:     Culture - Sputum (collected 10 Christopher 2025 07:28)  Source: Sputum Sputum  Gram Stain (10 Christopher 2025 16:43):    Few polymorphonuclear leukocytes per low power field    Few Squamous epithelial cells per low power field    Few Gram Positive Cocci in Clusters per oil power field    Rare Gram Negative Rods per oil power field  Preliminary Report (11 Jun 2025 12:30):    Numerous Staphylococcus aureus    Culture - Urine (collected 09 Jun 2025 17:56)  Source: Clean Catch Clean Catch (Midstream)  Final Report (10 Christopher 2025 19:09):    <10,000 CFU/mL Normal Urogenital Jessica    Culture - Sputum (collected 09 Jun 2025 14:19)  Source: Sputum Sputum  Gram Stain (10 Christopher 2025 00:18):    Moderate polymorphonuclear leukocytes per low power field    Few Squamous epithelial cells per low power field    Few Gram Positive Cocci in Clusters per oil power field    Rare Gram Negative Rods per oil power field  Preliminary Report (10 Christopher 2025 19:39):    Few Staphylococcus aureus    Commensal jessica consistent with body site    Culture - Blood (collected 09 Jun 2025 08:15)  Source: Blood Blood-Peripheral  Gram Stain (10 Christopher 2025 11:12):    Growth in anaerobic bottle: Gram Positive Cocci in Clusters  Preliminary Report (10 Christopher 2025 11:12):    Growth in anaerobic bottle: Gram Positive Cocci in Clusters    Culture - Blood (collected 09 Jun 2025 08:00)  Source: Blood Blood-Peripheral  Gram Stain (10 Christopher 2025 08:45):    Growth in anaerobic bottle: Gram Positive Cocci in Clusters    Growth in aerobic bottle: Gram Positive Cocci in Clusters  Preliminary Report (10 Christopher 2025 08:45):    Growth in anaerobic bottle: Gram Positive Cocci in Clusters    Growth in aerobic bottle: Gram Positive Cocci in Clusters    Direct identification is available within approximately 3-5    hours either by Blood Panel Multiplexed PCR or Direct    MALDI-TOF. Details: [email or web address]  Organism: Blood Culture PCR (10 Christopher 2025 07:03)  Organism: Blood Culture PCR (10 Christopher 2025 07:03)        RADIOLOGY:  [ ] Reviewed and interpreted by me    PULMONARY FUNCTION TESTS:    EKG:

## 2025-06-11 NOTE — DIETITIAN INITIAL EVALUATION ADULT - REASON
Patient not in room during attempted visits. RD to perform physical exam at follow up or as indicated.

## 2025-06-11 NOTE — DIETITIAN INITIAL EVALUATION ADULT - REASON INDICATOR FOR ASSESSMENT
Dietitian consult received for: Pressure injuries, pressure injury stage 2 or >   Chart reviewed, events noted   Information obtained from: electronic medical record

## 2025-06-11 NOTE — PROGRESS NOTE ADULT - ASSESSMENT
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 Patient reporting worsening cough and SOB over last 2-3 days. Patient recently dx with MRSA on sputum culture, started treatment on Bactrim. Reports she recently started Bactrim ds 1 BID x 10 days (6/5/2025)    Afebrile here in the ED. WBC on admission elevated to 25K. CXR on admission showing Right patchy airspace opacity may reflect pneumonia. CT chest non contrast - Multifocal pneumonia. Found to have MRSA bacteremia. Has a TAVR in place ( 2023),  Has Bilateral TKA ( 20 years ago), plate and screws across pubic symphysis.       # High grade MRSA bacteremia ( left leg open wound likely source vs PNA)   # Multifocal PNA  # Recent MRSA PNA  # Left leg open wound   # S/P TAVR in 2023   # S/P internal fixation of remote fractures of the superior pubic rami and pubic symphysis  # Bilateral TKA - 20 years ago   # Leucocytosis   # Immunocompromised host   # severe persistent asthma-steroid dependent  # bronchiectasis/COPD-   # H/o multiple infections   # Chronic cough   # H/O multiple antibiotic allergies   # Elevated inflammatory markers     MICRO:   06/09 Bcx- MRSA ( 3/4)   06/09 Sputum cx-  Few staph aureus   06/09 Urine cx- Negative   06/09 Urine strep and urine legionella- Negative     06/02/25- Sputum cx- MRSA S- doxy, S- bactrim, S- linezolid, S- vancomycin     Abx-   Ertapenem and Vancomycin 06/09-- current     Recommendations:     - Can stop ertapenem today  - Obtain MRI lumbar spine W/wo contrast ( given bacteremia and spinal tenderness/back pain)   - Obtain a TTE, will likely need a SAFIA given TAVR   - Would obtain a CTAP w contrast to look for occult abscess given MRSA bacteremia.   - Repeat blood cx X 2 Q48 hrs until clearance is documented   - Follow sputum cx until completion   - Continue Vancomycin 1 gm Q24H for now, Please obtain V. levels today   - Monitor V. levels, AUC goal 400-600    - Please call wound care   - Trend ESR/CRP      In addition to reviewing history, imaging, documents, labs, microbiology, took into account antibiotic stewardship, local antibiogram and infection control strategies and potential transmission issues.    Discussed with the primary team. 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 Patient reporting worsening cough and SOB over last 2-3 days. Patient recently dx with MRSA on sputum culture, started treatment on Bactrim. Reports she recently started Bactrim ds 1 BID x 10 days (6/5/2025)    Afebrile here in the ED. WBC on admission elevated to 25K. CXR on admission showing Right patchy airspace opacity may reflect pneumonia. CT chest non contrast - Multifocal pneumonia. Found to have MRSA bacteremia. Has a TAVR in place ( 2023),  Has Bilateral TKA ( 20 years ago), plate and screws across pubic symphysis.       # High grade MRSA bacteremia ( left leg open wound likely source vs PNA)   # Multifocal PNA  # Recent MRSA PNA  # Left leg open wound   # S/P TAVR in 2023   # S/P internal fixation of remote fractures of the superior pubic rami and pubic symphysis  # Bilateral TKA - 20 years ago   # Leucocytosis   # Immunocompromised host   # severe persistent asthma-steroid dependent  # bronchiectasis/COPD-   # H/o multiple infections   # Chronic cough   # H/O multiple antibiotic allergies   # Elevated inflammatory markers     MICRO:   06/09 Bcx- MRSA ( 3/4)   06/09 Sputum cx-  Few staph aureus   06/09 Urine cx- Negative   06/09 Urine strep and urine legionella- Negative     06/02/25- Sputum cx- MRSA S- doxy, S- bactrim, S- linezolid, S- vancomycin     Abx-   Ertapenem and Vancomycin 06/09-- current     Recommendations:     - Can stop ertapenem today  - Obtain MRI lumbar spine W/wo contrast ( given bacteremia and spinal tenderness/back pain)   - Obtain a TTE, will likely need a SAFIA given TAVR   - Would obtain a CTAP w contrast to look for occult abscess given MRSA bacteremia.   - Repeat blood cx X 2 Q48 hrs until clearance is documented   - Follow sputum cx until completion   - Continue Vancomycin, change dose to 750 mg Q12H.  - Monitor V. levels, AUC goal 400-600    - Please call wound care   - Trend ESR/CRP      In addition to reviewing history, imaging, documents, labs, microbiology, took into account antibiotic stewardship, local antibiogram and infection control strategies and potential transmission issues.    Discussed with the primary team. 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 Patient reporting worsening cough and SOB over last 2-3 days. Patient recently dx with MRSA on sputum culture, started treatment on Bactrim. Reports she recently started Bactrim ds 1 BID x 10 days (6/5/2025)    Afebrile here in the ED. WBC on admission elevated to 25K. CXR on admission showing Right patchy airspace opacity may reflect pneumonia. CT chest non contrast - Multifocal pneumonia. Found to have MRSA bacteremia. Has a TAVR in place (severe AS s/p TAVR (9/10/2023)),  Has Bilateral TKA ( 20 years ago), plate and screws across pubic symphysis.       # High grade MRSA bacteremia ( left leg open wound likely source vs PNA)   # Multifocal PNA  # Recent MRSA PNA  # Left leg open wound   # S/P TAVR in 2023   # S/P internal fixation of remote fractures of the superior pubic rami and pubic symphysis  # Bilateral TKA - 20 years ago   # Leucocytosis   # Immunocompromised host   # severe persistent asthma-steroid dependent  # bronchiectasis/COPD-   # H/o multiple infections   # Chronic cough   # H/O multiple antibiotic allergies   # Elevated inflammatory markers     MICRO:   06/09 Bcx- MRSA ( 3/4)   06/09 Sputum cx-  Few staph aureus   06/09 Urine cx- Negative   06/09 Urine strep and urine legionella- Negative     06/02/25- Sputum cx- MRSA S- doxy, S- bactrim, S- linezolid, S- vancomycin     Abx-   Ertapenem and Vancomycin 06/09-- current     Recommendations:     - Can stop ertapenem today  - Obtain MRI lumbar spine W/wo contrast ( given bacteremia and spinal tenderness/back pain)   - Obtain a TTE, will likely need a SAFIA given TAVR   - Would obtain a CTAP w contrast to look for occult abscess given MRSA bacteremia.   - Repeat blood cx X 2 Q48 hrs until clearance is documented   - Follow sputum cx until completion   - Continue Vancomycin, change dose to 750 mg Q12H.  - Monitor V. levels, AUC goal 400-600    - Please call wound care   - Trend ESR/CRP      In addition to reviewing history, imaging, documents, labs, microbiology, took into account antibiotic stewardship, local antibiogram and infection control strategies and potential transmission issues.    Discussed with the primary team. Spoke to daughter Zoe over the phone and explained the plan in detail     Mally Ramirez MD  Attending Physician, Division of Infectious Diseases  Department of Medicine   Health system    Contact on TEAMS messaging from 9am - 5pm  Office: [phone] (after 5 PM or weekend)

## 2025-06-11 NOTE — PROGRESS NOTE ADULT - PROBLEM SELECTOR PLAN 3
Check Hgb A1c  Takes Lantus 22u in AM and 28u in PM.  Continue Lantus 28u QHS for now  ISS   Monitor FS Check Hgb A1c  Takes Lantus 22u in AM and 28u in PM.  incr Lantus 32u QHS   moderate dose ISS   Monitor FS

## 2025-06-11 NOTE — DIETITIAN INITIAL EVALUATION ADULT - ADD RECOMMEND
1. Continue Consistent Carbohydrate Diet. Recommend d/c DASH/ TLC, and liberalize to Low Sodium Diet  2. Continue Glucerna BID (440kcals, 20g protein) for p.o. optimization   3. Recommend re-check patient's height and weight this admission  4. Monitor routine weights, nutrition related labs, fingersticks, diet advancements,  p.o. intake and tolerance, oral nutrition supplement compliance

## 2025-06-11 NOTE — DIETITIAN INITIAL EVALUATION ADULT - PERSON TAUGHT/METHOD
Handouts provided:   - " Carbohydrate Counting For People With Diabetes"  - "Plate Method For Diabetes"/written material

## 2025-06-11 NOTE — DIETITIAN INITIAL EVALUATION ADULT - NS FNS DIET ORDER
Diet, Regular:   Consistent Carbohydrate {No Snacks} (CSTCHO)  DASH/TLC {Sodium & Cholesterol Restricted} (DASH)  No Shellfish  Supplement Feeding Modality:  Oral  Glucerna Shake Cans or Servings Per Day:  2       Frequency:  Daily (06-10-25 @ 13:38) [Active]

## 2025-06-11 NOTE — PROGRESS NOTE ADULT - PROBLEM SELECTOR PLAN 7
bowel regimen   daughter gives her mineral oil, senna, and milk of magnesia daily   with lactulose and miralax prn if constipated

## 2025-06-11 NOTE — PROGRESS NOTE ADULT - ASSESSMENT
Patient is a 73 yo F w/ asthma (chronic methypred 8mg PO daily), bronchiectasis, allergic rhinitis, recurrent PNA, SDB ( on CPAP), tracheomalacia s/p tracheoplasty, tracheobronchomalacia, pAfib (Eliquis), colorectal ca s/p colostomy, aortic dissection s/p ascending aorta repair who p/w cough, SOB and hemoptysis.     Outpatient sputum cultures from 6/2 growing MRSA    Now with MRSA bacteremia as well    Home Airway clearance regimen: Albuterol q6h -> Chest Vest -> Perforomist BID  -> Pulmicort BID, - > Ohtuvayre 2.5 BID - > HyperSal 7% q12h. Patient also on Breo Ellipta 200 at 1 inhalation QD, Incruse Ellipta 1 puff QD, and on Tezspire airway     #SOB - Likely 2/2 COPD exacerbation/PNA  #MRSA PNA  #MRSA bactermia  #Hemoptysis  #Acute Asthma exacerbation  #SDB - on CPAP  - c/w Airway clearance regimen as possible Albuterol q6h -> Chest Vest -> Perforomist BID (patient brought in) -> Pulmicort BID, - > Ohtuvayre 2.5 BID (patient brought in). Can resume 7% hypersal given reported hemoptysis improving  - Please collect all hemoptysis at bedside in cup. If greater than 150 cc in 24 hours or greater than 100 cc in 1 hour, please call Pulm/MICU immediately. Can resume Eliquis given reported hemoptysis improving  - Patient also on Breo Ellipta 200 at 1 inhalation QD, Incruse Ellipta 1 puff QD. Can use Advair/Spiriva while admitted  - Tezspire outpatient   - Agree with empiric Vanc/Ertapenem for now. f/u cultures. Check TTE. f/u MRI spine. f/u ID recs  - c/w home CPAP and Chest Vest for use  - Can decrease steroids back to home dose    Shaan Shah MD  Pulmonary & Critical Care

## 2025-06-11 NOTE — PROGRESS NOTE ADULT - SUBJECTIVE AND OBJECTIVE BOX
[facility] Division of Hospital Medicine  Matilde Bellamy DO  [employer]    Patient is a 74y old  Female who presents with a chief complaint of SOB (11 Jun 2025 13:30)        SUBJECTIVE / OVERNIGHT EVENTS:      MEDICATIONS  (STANDING):  albuterol    0.083% 2.5 milliGRAM(s) Nebulizer every 6 hours  apixaban 5 milliGRAM(s) Oral two times a day  artificial  tears Solution 1 Drop(s) Both EYES every 4 hours  budesonide    Inhalation Suspension 1 milliGRAM(s) Inhalation two times a day  chlorhexidine 2% Cloths 1 Application(s) Topical daily  dextrose 5%. 1000 milliLiter(s) (50 mL/Hr) IV Continuous <Continuous>  dextrose 5%. 1000 milliLiter(s) (100 mL/Hr) IV Continuous <Continuous>  dextrose 50% Injectable 25 Gram(s) IV Push once  dextrose 50% Injectable 12.5 Gram(s) IV Push once  dextrose 50% Injectable 25 Gram(s) IV Push once  ertapenem  IVPB 1000 milliGRAM(s) IV Intermittent every 24 hours  famotidine    Tablet 20 milliGRAM(s) Oral daily  fluticasone propionate/ salmeterol 250-50 MICROgram(s) Diskus 1 Dose(s) Inhalation two times a day  formoterol for nebulization 20 MICROGram(s) Nebulizer every 12 hours  glucagon  Injectable 1 milliGRAM(s) IntraMuscular once  insulin glargine Injectable (LANTUS) 32 Unit(s) SubCutaneous at bedtime  insulin lispro (ADMELOG) corrective regimen sliding scale   SubCutaneous three times a day before meals  labetalol 100 milliGRAM(s) Oral every 8 hours  lacosamide 100 milliGRAM(s) Oral two times a day  levETIRAcetam 1000 milliGRAM(s) Oral two times a day  mexiletine 200 milliGRAM(s) Oral three times a day  montelukast 10 milliGRAM(s) Oral at bedtime  mupirocin 2% Nasal 1 Application(s) Both Nostrils two times a day  NIFEdipine XL 60 milliGRAM(s) Oral daily  Ohtuvayre 3 mg/2.5 mL Inhalation 3 milliGRAM(s) 3 milliGRAM(s) Nebulizer every 12 hours  pantoprazole    Tablet 40 milliGRAM(s) Oral before breakfast  predniSONE   Tablet 40 milliGRAM(s) Oral daily  rosuvastatin 5 milliGRAM(s) Oral at bedtime  senna 2 Tablet(s) Oral at bedtime  sertraline 50 milliGRAM(s) Oral daily  sodium chloride 0.9%. 1000 milliLiter(s) (75 mL/Hr) IV Continuous <Continuous>  tiotropium 2.5 MICROgram(s) Inhaler 2 Puff(s) Inhalation daily  vancomycin  IVPB 1000 milliGRAM(s) IV Intermittent every 24 hours    MEDICATIONS  (PRN):  dextrose Oral Gel 15 Gram(s) Oral once PRN Blood Glucose LESS THAN 70 milliGRAM(s)/deciliter  melatonin 3 milliGRAM(s) Oral at bedtime PRN Insomnia  ondansetron Injectable 4 milliGRAM(s) IV Push every 8 hours PRN Nausea and/or Vomiting      Vital Signs Last 24 Hrs  T(C): 36.7 (11 Jun 2025 13:03), Max: 37.2 (10 Christopher 2025 16:36)  T(F): 98 (11 Jun 2025 13:03), Max: 98.9 (10 Christopher 2025 16:36)  HR: 55 (11 Jun 2025 13:03) (55 - 74)  BP: 108/60 (11 Jun 2025 13:03) (107/60 - 133/74)  BP(mean): --  RR: 18 (11 Jun 2025 13:03) (18 - 18)  SpO2: 97% (11 Jun 2025 13:03) (93% - 97%)    Parameters below as of 11 Jun 2025 13:03  Patient On (Oxygen Delivery Method): room air      CAPILLARY BLOOD GLUCOSE      POCT Blood Glucose.: 310 mg/dL (11 Jun 2025 13:00)  POCT Blood Glucose.: 262 mg/dL (11 Jun 2025 07:46)  POCT Blood Glucose.: 185 mg/dL (10 Christopher 2025 21:08)  POCT Blood Glucose.: 280 mg/dL (10 Christopher 2025 17:07)    I&O's Summary    10 Christopher 2025 07:01  -  11 Jun 2025 07:00  --------------------------------------------------------  IN: 120 mL / OUT: 575 mL / NET: -455 mL    11 Jun 2025 07:01  -  11 Jun 2025 14:43  --------------------------------------------------------  IN: 240 mL / OUT: 0 mL / NET: 240 mL        PHYSICAL EXAM:  GENERAL: NAD, breathing normal  HEAD:  Atraumatic, Normocephalic  EYES: conjunctiva and sclera clear  NECK: supple, No JVD  CHEST/LUNG: CTA b/l  HEART: S1 S2 RRR  ABDOMEN: +BS Soft, NT/ND  EXTREMITIES:  2+ DP Pulses, No c/c. no LE edema  NEUROLOGY: AAOx3, no facial droop, moving all extremities   SKIN: No rashes or lesions    LABS:                        12.3   17.63 )-----------( 185      ( 11 Jun 2025 09:50 )             39.3     06-11    135  |  102  |  19  ----------------------------<  322[H]  4.9   |  20[L]  |  0.59    Ca    9.0      11 Jun 2025 09:50    TPro  5.7[L]  /  Alb  3.1[L]  /  TBili  0.4  /  DBili  x   /  AST  15  /  ALT  16  /  AlkPhos  68  06-10          Urinalysis Basic - ( 11 Jun 2025 09:50 )    Color: x / Appearance: x / SG: x / pH: x  Gluc: 322 mg/dL / Ketone: x  / Bili: x / Urobili: x   Blood: x / Protein: x / Nitrite: x   Leuk Esterase: x / RBC: x / WBC x   Sq Epi: x / Non Sq Epi: x / Bacteria: x        RADIOLOGY & ADDITIONAL TESTS:    Imaging Personally Reviewed:  Consultant(s) Notes Reviewed:    Care Discussed with Consultants/Other Providers:

## 2025-06-11 NOTE — DIETITIAN INITIAL EVALUATION ADULT - ORAL INTAKE PTA/DIET HISTORY
Attempted to visit patient multiple times today. Patient not in room, and family not present at the bedside. Unable to obtain history at this time.   Per chart, shellfish allergy noted (reaction: anaphylaxis).

## 2025-06-11 NOTE — PROGRESS NOTE ADULT - SUBJECTIVE AND OBJECTIVE BOX
Patient is a 74y old  Female who presents with a chief complaint of SOB (10 Christopher 2025 15:12)    Being followed by ID for MRSA bacteremia     Interval history:  reports improvement in cough   Still with back pain   labs from this morning are pending   pending workup for mrsa bacteremia     ROS:  No N/V  No abd pain  No urinary complaints  No HA  No joint or limb pain      Antimicrobials:  ertapenem  IVPB 1000 milliGRAM(s) IV Intermittent every 24 hours  vancomycin  IVPB 1000 milliGRAM(s) IV Intermittent every 24 hours      Vital Signs Last 24 Hrs  T(C): 36.9 (06-11-25 @ 04:32), Max: 37.2 (06-10-25 @ 16:36)  T(F): 98.5 (06-11-25 @ 04:32), Max: 98.9 (06-10-25 @ 16:36)  HR: 62 (06-11-25 @ 04:32) (62 - 74)  BP: 133/74 (06-11-25 @ 04:32) (98/62 - 133/74)  BP(mean): --  RR: 18 (06-11-25 @ 04:32) (18 - 18)  SpO2: 93% (06-11-25 @ 04:32) (93% - 100%)      PHYSICAL EXAM:  GENERAL: Awake, alert, appears uncomfortable, coughing with mucus production   \HEAD: NC/AT, neck supple, moist mucous membranes  \EYES: PERRL, EOM grossly intact, sclera anicteric  \LUNGS: coarse/rhonchorous breath sounds bilaterally,   CARDIAC: RRR, no m/r/g  ABDOMEN: Soft, non tender, ostomy site well appearing with brown stool output  EXT: Left leg open wound- purulent drainage., B/L Knee TKA  NEURO: A&Ox3. Moving all extremities without focal deficit.   SKIN: Warm and dry. No rash.    Lab Data:                        11.5   22.97 )-----------( 173      ( 10 Christopher 2025 07:28 )             36.4     06-10    138  |  107  |  16  ----------------------------<  70  4.4   |  18[L]  |  0.70    Ca    8.6      10 Christopher 2025 07:28    TPro  5.7[L]  /  Alb  3.1[L]  /  TBili  0.4  /  DBili  x   /  AST  15  /  ALT  16  /  AlkPhos  68  06-10      Sputum Sputum  06-10-25 --  --    Few polymorphonuclear leukocytes per low power field  Few Squamous epithelial cells per low power field  Few Gram Positive Cocci in Clusters per oil power field  Rare Gram Negative Rods per oil power field      Clean Catch Clean Catch (Midstream)  06-09-25   <10,000 CFU/mL Normal Urogenital Val  --  --      Sputum Sputum  06-09-25   Few Staphylococcus aureus  Commensal val consistent with body site  --    Moderate polymorphonuclear leukocytes per low power field  Few Squamous epithelial cells per low power field  Few Gram Positive Cocci in Clusters per oil power field  Rare Gram Negative Rods per oil power field      Blood Blood-Peripheral  06-09-25   Growth in anaerobic bottle: Gram Positive Cocci in Clusters  --    Growth in anaerobic bottle: Gram Positive Cocci in Clusters      Blood Blood-Peripheral  06-09-25   Growth in anaerobic bottle: Gram Positive Cocci in Clusters  Growth in aerobic bottle: Gram Positive Cocci in Clusters  Direct identification is available within approximately 3-5  hours either by Blood Panel Multiplexed PCR or Direct  MALDI-TOF. Details: [email or web address]  --  Blood Culture PCR                  RADIOLOGY:  below reviewed    ACC: [id number] EXAM:  CT CHEST   ORDERED BY: GERRY SALAS     PROCEDURE DATE:  06/09/2025          INTERPRETATION:  CLINICAL INFORMATION: Shortness of breath. Evaluate for   pneumonia. History of bronchiectasis and tracheomalacia status post   tracheoplasty.    COMPARISON: CT chest 4/17/2025, CTA chest abdomen and pelvis 2/20/2025,   and additional chest CTs dating back to 9/22/2016.    CONTRAST/COMPLICATIONS:  IV Contrast: None  Oral Contrast: None.    PROCEDURE:  CT of the Chest was performed.  Sagittal and coronal reformats were performed.    FINDINGS:    LUNGS AND LARGE AIRWAYS: Mild central airways secretions. Multifocal   patchy and coalescent consolidative and ground-glass opacities in the   posterior right upper lobe, right middle lobe, and bilateral lower lobes.  PLEURA: Trace left pleural fluid.  HEART/VESSELS: Heart size is normal. No pericardial effusion. Status post   ascending aortic and aortic valve replacement with internal   valve-in-valve TAVR. Residual dissection involving the thoracolumbar   abdominal aorta, better evaluated on chest CTA dated 2/20/2025.  MEDIASTINUM AND MARANDA: No lymphadenopathy.  CHEST WALL AND LOWER NECK: Within normal limits.  VISUALIZED UPPER ABDOMEN: 1 cm left renal cyst. Stable partially imaged   complex left renal cyst with mild peripheral calcifications. A few stable   hypoattenuating pancreatic cystic lesions.  BONES: Sternotomy. Degenerative changes.    IMPRESSION:  Multifocal pneumonia. Recommend follow-up chest CT in 3 months to ensure   clearance.

## 2025-06-12 LAB
-  CLINDAMYCIN: SIGNIFICANT CHANGE UP
-  CLINDAMYCIN: SIGNIFICANT CHANGE UP
-  DAPTOMYCIN: SIGNIFICANT CHANGE UP
-  ERYTHROMYCIN: SIGNIFICANT CHANGE UP
-  ERYTHROMYCIN: SIGNIFICANT CHANGE UP
-  GENTAMICIN: SIGNIFICANT CHANGE UP
-  GENTAMICIN: SIGNIFICANT CHANGE UP
-  LINEZOLID: SIGNIFICANT CHANGE UP
-  LINEZOLID: SIGNIFICANT CHANGE UP
-  OXACILLIN: SIGNIFICANT CHANGE UP
-  OXACILLIN: SIGNIFICANT CHANGE UP
-  PENICILLIN: SIGNIFICANT CHANGE UP
-  PENICILLIN: SIGNIFICANT CHANGE UP
-  RIFAMPIN: SIGNIFICANT CHANGE UP
-  RIFAMPIN: SIGNIFICANT CHANGE UP
-  TETRACYCLINE: SIGNIFICANT CHANGE UP
-  TETRACYCLINE: SIGNIFICANT CHANGE UP
-  TRIMETHOPRIM/SULFAMETHOXAZOLE: SIGNIFICANT CHANGE UP
-  TRIMETHOPRIM/SULFAMETHOXAZOLE: SIGNIFICANT CHANGE UP
-  VANCOMYCIN: SIGNIFICANT CHANGE UP
-  VANCOMYCIN: SIGNIFICANT CHANGE UP
ANION GAP SERPL CALC-SCNC: 13 MMOL/L — SIGNIFICANT CHANGE UP (ref 5–17)
BUN SERPL-MCNC: 21 MG/DL — SIGNIFICANT CHANGE UP (ref 7–23)
CALCIUM SERPL-MCNC: 8.9 MG/DL — SIGNIFICANT CHANGE UP (ref 8.4–10.5)
CHLORIDE SERPL-SCNC: 106 MMOL/L — SIGNIFICANT CHANGE UP (ref 96–108)
CO2 SERPL-SCNC: 21 MMOL/L — LOW (ref 22–31)
CREAT SERPL-MCNC: 0.64 MG/DL — SIGNIFICANT CHANGE UP (ref 0.5–1.3)
CULTURE RESULTS: ABNORMAL
EGFR: 92 ML/MIN/1.73M2 — SIGNIFICANT CHANGE UP
EGFR: 92 ML/MIN/1.73M2 — SIGNIFICANT CHANGE UP
GLUCOSE BLDC GLUCOMTR-MCNC: 143 MG/DL — HIGH (ref 70–99)
GLUCOSE BLDC GLUCOMTR-MCNC: 251 MG/DL — HIGH (ref 70–99)
GLUCOSE BLDC GLUCOMTR-MCNC: 278 MG/DL — HIGH (ref 70–99)
GLUCOSE BLDC GLUCOMTR-MCNC: 322 MG/DL — HIGH (ref 70–99)
GLUCOSE SERPL-MCNC: 227 MG/DL — HIGH (ref 70–99)
GRAM STN FLD: ABNORMAL
HCT VFR BLD CALC: 37.8 % — SIGNIFICANT CHANGE UP (ref 34.5–45)
HGB BLD-MCNC: 11.9 G/DL — SIGNIFICANT CHANGE UP (ref 11.5–15.5)
MCHC RBC-ENTMCNC: 24.2 PG — LOW (ref 27–34)
MCHC RBC-ENTMCNC: 31.5 G/DL — LOW (ref 32–36)
MCV RBC AUTO: 77 FL — LOW (ref 80–100)
METHOD TYPE: SIGNIFICANT CHANGE UP
METHOD TYPE: SIGNIFICANT CHANGE UP
NRBC BLD AUTO-RTO: 0 /100 WBCS — SIGNIFICANT CHANGE UP (ref 0–0)
ORGANISM # SPEC MICROSCOPIC CNT: ABNORMAL
PLATELET # BLD AUTO: 205 K/UL — SIGNIFICANT CHANGE UP (ref 150–400)
POTASSIUM SERPL-MCNC: 5 MMOL/L — SIGNIFICANT CHANGE UP (ref 3.5–5.3)
POTASSIUM SERPL-SCNC: 5 MMOL/L — SIGNIFICANT CHANGE UP (ref 3.5–5.3)
RBC # BLD: 4.91 M/UL — SIGNIFICANT CHANGE UP (ref 3.8–5.2)
RBC # FLD: 17.2 % — HIGH (ref 10.3–14.5)
SODIUM SERPL-SCNC: 140 MMOL/L — SIGNIFICANT CHANGE UP (ref 135–145)
SPECIMEN SOURCE: SIGNIFICANT CHANGE UP
WBC # BLD: 17.81 K/UL — HIGH (ref 3.8–10.5)
WBC # FLD AUTO: 17.81 K/UL — HIGH (ref 3.8–10.5)

## 2025-06-12 PROCEDURE — 93010 ELECTROCARDIOGRAM REPORT: CPT

## 2025-06-12 PROCEDURE — 99233 SBSQ HOSP IP/OBS HIGH 50: CPT

## 2025-06-12 PROCEDURE — 74177 CT ABD & PELVIS W/CONTRAST: CPT | Mod: 26

## 2025-06-12 PROCEDURE — G0545: CPT

## 2025-06-12 RX ORDER — POLYETHYLENE GLYCOL 3350 17 G/17G
17 POWDER, FOR SOLUTION ORAL DAILY
Refills: 0 | Status: DISCONTINUED | OUTPATIENT
Start: 2025-06-12 | End: 2025-06-21

## 2025-06-12 RX ADMIN — INSULIN LISPRO 8: 100 INJECTION, SOLUTION INTRAVENOUS; SUBCUTANEOUS at 18:09

## 2025-06-12 RX ADMIN — Medication 40 MILLIGRAM(S): at 05:44

## 2025-06-12 RX ADMIN — Medication 2.5 MILLIGRAM(S): at 12:03

## 2025-06-12 RX ADMIN — FORMOTEROL FUMARATE DIHYDRATE 20 MICROGRAM(S): 20 SOLUTION RESPIRATORY (INHALATION) at 17:55

## 2025-06-12 RX ADMIN — LEVETIRACETAM 1000 MILLIGRAM(S): 10 INJECTION, SOLUTION INTRAVENOUS at 17:52

## 2025-06-12 RX ADMIN — Medication 1 APPLICATION(S): at 12:04

## 2025-06-12 RX ADMIN — Medication 1 DROP(S): at 05:54

## 2025-06-12 RX ADMIN — ROSUVASTATIN CALCIUM 5 MILLIGRAM(S): 20 TABLET, FILM COATED ORAL at 21:49

## 2025-06-12 RX ADMIN — MEXILETINE HYDROCHLORIDE 200 MILLIGRAM(S): 250 CAPSULE ORAL at 13:32

## 2025-06-12 RX ADMIN — Medication 1 DOSE(S): at 17:54

## 2025-06-12 RX ADMIN — Medication 2 TABLET(S): at 21:50

## 2025-06-12 RX ADMIN — MAGNESIUM HYDROXIDE 30 MILLILITER(S): 400 SUSPENSION ORAL at 12:08

## 2025-06-12 RX ADMIN — MONTELUKAST SODIUM 10 MILLIGRAM(S): 10 TABLET ORAL at 21:50

## 2025-06-12 RX ADMIN — SERTRALINE 50 MILLIGRAM(S): 100 TABLET, FILM COATED ORAL at 12:02

## 2025-06-12 RX ADMIN — Medication 1 DROP(S): at 10:41

## 2025-06-12 RX ADMIN — Medication 1 DROP(S): at 17:54

## 2025-06-12 RX ADMIN — Medication 250 MILLIGRAM(S): at 01:22

## 2025-06-12 RX ADMIN — Medication 2.5 MILLIGRAM(S): at 18:10

## 2025-06-12 RX ADMIN — LACOSAMIDE 100 MILLIGRAM(S): 150 TABLET, FILM COATED ORAL at 06:01

## 2025-06-12 RX ADMIN — Medication 20 MILLIGRAM(S): at 12:02

## 2025-06-12 RX ADMIN — PREDNISONE 50 MILLIGRAM(S): 20 TABLET ORAL at 07:36

## 2025-06-12 RX ADMIN — PREDNISONE 50 MILLIGRAM(S): 20 TABLET ORAL at 13:32

## 2025-06-12 RX ADMIN — TIOTROPIUM BROMIDE INHALATION SPRAY 2 PUFF(S): 3.12 SPRAY, METERED RESPIRATORY (INHALATION) at 12:03

## 2025-06-12 RX ADMIN — INSULIN GLARGINE-YFGN 32 UNIT(S): 100 INJECTION, SOLUTION SUBCUTANEOUS at 21:50

## 2025-06-12 RX ADMIN — BUDESONIDE 1 MILLIGRAM(S): 0.25 SUSPENSION RESPIRATORY (INHALATION) at 17:53

## 2025-06-12 RX ADMIN — MUPIROCIN CALCIUM 1 APPLICATION(S): 20 CREAM TOPICAL at 05:43

## 2025-06-12 RX ADMIN — Medication 1 DROP(S): at 13:32

## 2025-06-12 RX ADMIN — APIXABAN 5 MILLIGRAM(S): 2.5 TABLET, FILM COATED ORAL at 06:01

## 2025-06-12 RX ADMIN — LACOSAMIDE 100 MILLIGRAM(S): 150 TABLET, FILM COATED ORAL at 17:51

## 2025-06-12 RX ADMIN — LEVETIRACETAM 1000 MILLIGRAM(S): 10 INJECTION, SOLUTION INTRAVENOUS at 05:54

## 2025-06-12 RX ADMIN — APIXABAN 5 MILLIGRAM(S): 2.5 TABLET, FILM COATED ORAL at 17:51

## 2025-06-12 RX ADMIN — MEXILETINE HYDROCHLORIDE 200 MILLIGRAM(S): 250 CAPSULE ORAL at 05:55

## 2025-06-12 RX ADMIN — LACTULOSE 10 GRAM(S): 10 SOLUTION ORAL at 12:02

## 2025-06-12 RX ADMIN — Medication 1 APPLICATION(S): at 12:03

## 2025-06-12 RX ADMIN — Medication 60 MILLIGRAM(S): at 05:44

## 2025-06-12 RX ADMIN — Medication 4 MILLILITER(S): at 17:55

## 2025-06-12 RX ADMIN — MEXILETINE HYDROCHLORIDE 200 MILLIGRAM(S): 250 CAPSULE ORAL at 21:49

## 2025-06-12 RX ADMIN — Medication 50 MILLIGRAM(S): at 13:32

## 2025-06-12 RX ADMIN — Medication 250 MILLIGRAM(S): at 12:04

## 2025-06-12 RX ADMIN — PREDNISONE 50 MILLIGRAM(S): 20 TABLET ORAL at 01:21

## 2025-06-12 NOTE — PROGRESS NOTE ADULT - SUBJECTIVE AND OBJECTIVE BOX
Patient is a 74y old  Female who presents with a chief complaint of SOB (12 Jun 2025 13:21)    Being followed by ID for MRSA bacteremia     Interval history:  hemoptysis this AM   Cough improving   Reports R chest pain with deep breaths   No other complaints     ROS:  No N/V  No abd pain  No urinary complaints  No HA  No joint or limb pain    Antimicrobials:  vancomycin  IVPB 750 milliGRAM(s) IV Intermittent every 12 hours      Vital Signs Last 24 Hrs  T(C): 36.4 (06-12-25 @ 11:02), Max: 36.9 (06-11-25 @ 16:32)  T(F): 97.5 (06-12-25 @ 11:02), Max: 98.5 (06-12-25 @ 05:10)  HR: 51 (06-12-25 @ 13:29) (46 - 78)  BP: 121/66 (06-12-25 @ 13:29) (112/63 - 136/74)  BP(mean): --  RR: 18 (06-12-25 @ 13:29) (18 - 18)  SpO2: 95% (06-12-25 @ 13:29) (91% - 99%)    PHYSICAL EXAM:  GENERAL: Awake, alert, appears uncomfortable, coughing with mucus production   \HEAD: NC/AT, neck supple, moist mucous membranes  \EYES: PERRL, EOM grossly intact, sclera anicteric  \LUNGS: coarse/rhonchorous breath sounds bilaterally,   CARDIAC: RRR, no m/r/g  ABDOMEN: Soft, non tender, ostomy site well appearing with brown stool output  EXT: Left leg open wound- purulent drainage., B/L Knee TKA  NEURO: A&Ox3. Moving all extremities without focal deficit.   SKIN: Warm and dry. No rash.    Lab Data:                        11.9   17.81 )-----------( 205      ( 12 Jun 2025 11:20 )             37.8     06-12    140  |  106  |  21  ----------------------------<  227[H]  5.0   |  21[L]  |  0.64    Ca    8.9      12 Jun 2025 11:20        Blood Blood  06-11-25   No growth at 24 hours  --  --      Blood Blood-Peripheral  06-11-25   No growth at 24 hours  --  --      Sputum Sputum  06-10-25   Numerous Methicillin Resistant Staphylococcus aureus  Normal Respiratory Val absent  --  Methicillin resistant Staphylococcus aureus      Clean Catch Clean Catch (Midstream)  06-09-25   <10,000 CFU/mL Normal Urogenital Val  --  --      Sputum Sputum  06-09-25   Moderate Methicillin Resistant Staphylococcus aureus  Commensal val consistent with body site  --  Methicillin resistant Staphylococcus aureus      Blood Blood-Peripheral  06-09-25   Growth in anaerobic bottle: Methicillin Resistant Staphylococcus aureus  --  Methicillin resistant Staphylococcus aureus      Blood Blood-Peripheral  06-09-25   Growth in aerobic and anaerobic bottles: Methicillin Resistant  Staphylococcus aureus  See previous culture  [id number] for susceptibility  Direct identification is available within approximately 3-5  hours either by Blood Panel Multiplexed PCR or Direct  MALDI-TOF. Details: [email or web address]  --  Blood Culture PCR              Vancomycin Level, Trough: 4.5 ug/mL (06-11-25 @ 08:24)            RADIOLOGY:  below reviewed    ACC: [id number] EXAM:  CT CHEST   ORDERED BY: GERRY SALAS     PROCEDURE DATE:  06/09/2025          INTERPRETATION:  CLINICAL INFORMATION: Shortness of breath. Evaluate for   pneumonia. History of bronchiectasis and tracheomalacia status post   tracheoplasty.    COMPARISON: CT chest 4/17/2025, CTA chest abdomen and pelvis 2/20/2025,   and additional chest CTs dating back to 9/22/2016.    CONTRAST/COMPLICATIONS:  IV Contrast: None  Oral Contrast: None.    PROCEDURE:  CT of the Chest was performed.  Sagittal and coronal reformats were performed.    FINDINGS:    LUNGS AND LARGE AIRWAYS: Mild central airways secretions. Multifocal   patchy and coalescent consolidative and ground-glass opacities in the   posterior right upper lobe, right middle lobe, and bilateral lower lobes.  PLEURA: Trace left pleural fluid.  HEART/VESSELS: Heart size is normal. No pericardial effusion. Status post   ascending aortic and aortic valve replacement with internal   valve-in-valve TAVR. Residual dissection involving the thoracolumbar   abdominal aorta, better evaluated on chest CTA dated 2/20/2025.  MEDIASTINUM AND MARANDA: No lymphadenopathy.  CHEST WALL AND LOWER NECK: Within normal limits.  VISUALIZED UPPER ABDOMEN: 1 cm left renal cyst. Stable partially imaged   complex left renal cyst with mild peripheral calcifications. A few stable   hypoattenuating pancreatic cystic lesions.  BONES: Sternotomy. Degenerative changes.    IMPRESSION:  Multifocal pneumonia. Recommend follow-up chest CT in 3 months to ensure   clearance.        MRI lumbar spine 06/11  IMPRESSION:  1. No evidence of discitis-osteomyelitis, epidural or paraspinal abscess.  2. Stable grade 1 anterolisthesis L3 over L4 with severe bilateral L3-L4   facet arthrosis.  3. L3-L4 anterolisthesis with a stable left foraminal-lateral disc   protrusion superimposed upon a disc bulge, resulting in moderate central   canal and mild left neural foramen stenosis with facet arthrosis and   ligamentum flavum hypertrophy, crowding the nerve roots of the cauda   equina. Left foraminal-lateral annular tear.  4. L4-L5 stable right foraminal-lateral disc protrusion superimposed upon   a disc bulge, resulting in mild central canal, bilateral lateral recess,   moderate right and mild left neural foramen stenosis with facet arthrosis   and ligamentum flavum hypertrophy, contacting the right L4 and L5 nerve   roots.  5. L5-S1 stable disc bulge-spondylitic ridge complex, resulting in mild   central canal and severe bilateral neural foramen stenosis with facet   arthrosis, impinging upon bilateral L5 nerve roots.  6. Additional findings, including those degenerative, described in detail   above.    --- End of Report ---    TTE 06/11   CONCLUSIONS:      1. Left ventricular systolic function is hyperdynamic with an ejection fraction of 81 % by Felipe's method of disks. There are no regional wall motion abnormalities seen.   2. Mild left ventricular hypertrophy.   3. There is moderate (grade 2) left ventricular diastolic dysfunction.   4. Enlarged right ventricular cavity size and mild to moderately reduced right ventricular systolic function. Tricuspid annular plane systolic excursion (TAPSE) is 1.1 cm (normal >=1.7 cm). Tricuspid annular tissue Doppler S' is 10.0 cm/s (normal >10 cm/s).   5. Moderate mitral regurgitation.   6. Estimated pulmonary artery systolic pressure is 46 mmHg.   7. No significant valvular disease.   8. The inferior vena cava is normal in size (normal <2.1cm) with normal inspiratory collapse (normal >50%) consistent with normal right atrial pressure (~3, range 0-5mmHg).   9. Compared to the transthoracic echocardiogram performed on 7/3/2024, there have been no significant interval changes.

## 2025-06-12 NOTE — PROVIDER CONTACT NOTE (MEDICATION) - ASSESSMENT
A/O x4, Spo2 96 on RA, no complaints of SOB or chest pain.
A/O x 4, HR 48-57, all other vital signs stable, patient asymptomatic.

## 2025-06-12 NOTE — PROVIDER CONTACT NOTE (MEDICATION) - BACKGROUND
Admitted for cough, PMH colon cancer, asthma, DM, HTN, TIA's, seizure disorder, COPD, and A-fib.
Admitted for cough, PMH colon cancer, asthma, DM, HTN, TIA's, seizure disorder, COPD, and A-fib.

## 2025-06-12 NOTE — PROGRESS NOTE ADULT - PROBLEM SELECTOR PLAN 1
--Met SIRS with leukocytosis and tachpnyea (RR 22) on initial presentation. Present on admission.   --Multifocal Pneumonia on Imaging.   --Bacteremia- MRSA 3/4 bottles, repeat 6/11 NTD, repeat bx q48 hours - recc to send another set in am    Sputum stain positive for Gram-positive cocci  MRSA PCR positive  TTE without endocarditis - ID follow up if need for SAFIA - monitoring repeat blood cultures   Maintained on broad-spectrum antibiotics.  ID and pulmonology following  Patient with many antibiotic allergies - continue ertapenem and Vanco for now

## 2025-06-12 NOTE — PROGRESS NOTE ADULT - PROBLEM SELECTOR PLAN 3
A1c 8.5  Takes Lantus 22u in AM and 28u in PM.  incr Lantus 32u QHS   moderate dose ISS   Fs better controlled  Monitor FS

## 2025-06-12 NOTE — CONSULT NOTE ADULT - ASSESSMENT
-no acute neurosurgical intervention  -PT/pain control per primary  -weight bearing as tolerated, no neurosurgical contraindication to physical therapy  -f/u outpatient with Dr. Asif 1-2 weeks

## 2025-06-12 NOTE — PROGRESS NOTE ADULT - ASSESSMENT
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 Patient reporting worsening cough and SOB over last 2-3 days. Patient recently dx with MRSA on sputum culture, started treatment on Bactrim. Reports she recently started Bactrim ds 1 BID x 10 days (6/5/2025)    Afebrile here in the ED. WBC on admission elevated to 25K. CXR on admission showing Right patchy airspace opacity may reflect pneumonia. CT chest non contrast - Multifocal pneumonia. Found to have MRSA bacteremia. Has a TAVR in place (severe AS s/p TAVR (9/10/2023)),  Has Bilateral TKA ( 20 years ago), plate and screws across pubic symphysis.     06/11 MRI lumbar spine-  No evidence of discitis-osteomyelitis, epidural or paraspinal abscess.  TTE on 06/11- TAVR, There is trace paravalvular regurgitation. Moderate MR. Compared to the transthoracic echocardiogram performed on 7/3/2024, there have been no significant interval changes.      # High grade MRSA bacteremia ( left leg open wound likely source vs PNA)  # Multifocal PNA  # Recent MRSA PNA  # Left leg open wound   # S/P TAVR in 2023   # S/P internal fixation of remote fractures of the superior pubic rami and pubic symphysis  # Bilateral TKA - 20 years ago   # Leucocytosis   # Immunocompromised host   # severe persistent asthma-steroid dependent  # bronchiectasis/COPD-   # H/o multiple infections   # Chronic cough   # H/O multiple antibiotic allergies   # Elevated inflammatory markers     MICRO:   06/09 Bcx- MRSA ( 3/4)   06/09 Sputum cx-  MRSA  06/09 Urine cx- Negative   06/09 Urine strep and urine legionella- Negative   06/11 Bcx- NGTD    06/02/25- Sputum cx- MRSA S- doxy, S- bactrim, S- linezolid, S- vancomycin     ABX;   Vancomycin 06/09-- current     Recommendations:   - TTE on 06/11- TAVR, There is trace paravalvular regurgitation. Moderate MR.   - Would obtain a CTAP w contrast to look for occult abscess given MRSA bacteremia.   - get Bcx X 2 on 06/13  - Continue Vancomycin 750 mg Q12H.  - Monitor V. levels, AUC goal 400-600    - Trend ESR/CRP  - Will likely need a SAFIA pending repeat bcx      In addition to reviewing history, imaging, documents, labs, microbiology, took into account antibiotic stewardship, local antibiogram and infection control strategies and potential transmission issues.    Discussed with the primary team.     Mally Ramirez MD  Attending Physician, Division of Infectious Diseases  Department of Medicine   [facility]    Contact on TEAMS messaging from 9am - 5pm  Office: [phone] (after 5 PM or weekend)

## 2025-06-12 NOTE — PROVIDER CONTACT NOTE (MEDICATION) - ACTION/TREATMENT ORDERED:
Patient educated on the importance of medication, provider notified, per provider medication rescheduled to 8am. Will endorse to AM nurse.
Provider aware, no new orders at this time.

## 2025-06-12 NOTE — PROGRESS NOTE ADULT - PROBLEM SELECTOR PLAN 7
bowel regimen   daughter gives her mineral oil, senna, and milk of magnesia daily   with lactulose and miralax prn if constipatedT

## 2025-06-12 NOTE — PROGRESS NOTE ADULT - SUBJECTIVE AND OBJECTIVE BOX
CHIEF COMPLAINT: SOB    Interval Events: Still with some hemoptysis but only scant dark clots    REVIEW OF SYSTEMS:  Constitutional: [ ] negative [ ] fevers [ ] chills [ ] weight loss [ ] weight gain  HEENT: [ ] negative [ ] dry eyes [ ] eye irritation [ ] postnasal drip [ ] nasal congestion  CV: [ ] negative  [ ] chest pain [ ] orthopnea [ ] palpitations [ ] murmur  Resp: [ ] negative [ ] cough [ ] shortness of breath [ ] dyspnea [ ] wheezing [ ] sputum [X] hemoptysis  GI: [ ] negative [ ] nausea [ ] vomiting [ ] diarrhea [ ] constipation [ ] abd pain [ ] dysphagia   : [ ] negative [ ] dysuria [ ] nocturia [ ] hematuria [ ] increased urinary frequency  Musculoskeletal: [ ] negative [ ] back pain [ ] myalgias [ ] arthralgias [ ] fracture  Skin: [ ] negative [ ] rash [ ] itch  Neurological: [ ] negative [ ] headache [ ] dizziness [ ] syncope [ ] weakness [ ] numbness  Psychiatric: [ ] negative [ ] anxiety [ ] depression  Endocrine: [ ] negative [ ] diabetes [ ] thyroid problem  Hematologic/Lymphatic: [ ] negative [ ] anemia [ ] bleeding problem  Allergic/Immunologic: [ ] negative [ ] itchy eyes [ ] nasal discharge [ ] hives [ ] angioedema  [X All other systems negative  [ ] Unable to assess ROS because ________    OBJECTIVE:  ICU Vital Signs Last 24 Hrs  T(C): 36.4 (12 Jun 2025 11:02), Max: 36.9 (11 Jun 2025 16:32)  T(F): 97.5 (12 Jun 2025 11:02), Max: 98.5 (12 Jun 2025 05:10)  HR: 53 (12 Jun 2025 11:02) (46 - 78)  BP: 127/73 (12 Jun 2025 11:02) (112/63 - 136/74)  BP(mean): --  ABP: --  ABP(mean): --  RR: 18 (12 Jun 2025 11:02) (18 - 18)  SpO2: 99% (12 Jun 2025 11:02) (91% - 99%)    O2 Parameters below as of 12 Jun 2025 11:02  Patient On (Oxygen Delivery Method): room air              06-11 @ 07:01  -  06-12 @ 07:00  --------------------------------------------------------  IN: 240 mL / OUT: 1050 mL / NET: -810 mL    06-12 @ 07:01  -  06-12 @ 13:21  --------------------------------------------------------  IN: 220 mL / OUT: 0 mL / NET: 220 mL      CAPILLARY BLOOD GLUCOSE      POCT Blood Glucose.: 251 mg/dL (12 Jun 2025 11:22)      PHYSICAL EXAM:  General: NAD  HEENT: EOMI, PERRLA, Exophthalmos  Neck: Supple  Respiratory: Scattered rhochi  Cardiovascular: S1S2, irregular  Abdomen: Soft, NTND, BS+  Extremities: No edema  Skin: Warm and dry, Bandage on L shin  Neurological: No gross focal deficits      HOSPITAL MEDICATIONS:  MEDICATIONS  (STANDING):  albuterol    0.083% 2.5 milliGRAM(s) Nebulizer every 6 hours  apixaban 5 milliGRAM(s) Oral two times a day  artificial  tears Solution 1 Drop(s) Both EYES every 4 hours  budesonide    Inhalation Suspension 1 milliGRAM(s) Inhalation two times a day  chlorhexidine 2% Cloths 1 Application(s) Topical daily  dextrose 5%. 1000 milliLiter(s) (50 mL/Hr) IV Continuous <Continuous>  dextrose 5%. 1000 milliLiter(s) (100 mL/Hr) IV Continuous <Continuous>  dextrose 50% Injectable 25 Gram(s) IV Push once  dextrose 50% Injectable 12.5 Gram(s) IV Push once  dextrose 50% Injectable 25 Gram(s) IV Push once  diphenhydrAMINE 50 milliGRAM(s) Oral once  famotidine    Tablet 20 milliGRAM(s) Oral daily  fluticasone propionate/ salmeterol 250-50 MICROgram(s) Diskus 1 Dose(s) Inhalation two times a day  formoterol for nebulization 20 MICROGram(s) Nebulizer every 12 hours  glucagon  Injectable 1 milliGRAM(s) IntraMuscular once  insulin glargine Injectable (LANTUS) 32 Unit(s) SubCutaneous at bedtime  insulin lispro (ADMELOG) corrective regimen sliding scale   SubCutaneous three times a day before meals  lacosamide 100 milliGRAM(s) Oral two times a day  levETIRAcetam 1000 milliGRAM(s) Oral two times a day  mexiletine 200 milliGRAM(s) Oral three times a day  mineral oil 30 milliLiter(s) Oral daily  montelukast 10 milliGRAM(s) Oral at bedtime  mupirocin 2% Nasal 1 Application(s) Both Nostrils two times a day  NIFEdipine XL 60 milliGRAM(s) Oral daily  Ohtuvayre 3 mg/2.5 mL Inhalation 3 milliGRAM(s) 3 milliGRAM(s) Nebulizer every 12 hours  pantoprazole    Tablet 40 milliGRAM(s) Oral before breakfast  polyethylene glycol 3350 17 Gram(s) Oral daily  predniSONE   Tablet 40 milliGRAM(s) Oral daily  predniSONE   Tablet 50 milliGRAM(s) Oral once  rosuvastatin 5 milliGRAM(s) Oral at bedtime  senna 2 Tablet(s) Oral at bedtime  sertraline 50 milliGRAM(s) Oral daily  silver sulfADIAZINE 1% Cream 1 Application(s) Topical daily  sodium chloride 0.9%. 1000 milliLiter(s) (75 mL/Hr) IV Continuous <Continuous>  sodium chloride 7% Inhalation 4 milliLiter(s) Inhalation every 12 hours  tiotropium 2.5 MICROgram(s) Inhaler 2 Puff(s) Inhalation daily  vancomycin  IVPB 750 milliGRAM(s) IV Intermittent every 12 hours    MEDICATIONS  (PRN):  dextrose Oral Gel 15 Gram(s) Oral once PRN Blood Glucose LESS THAN 70 milliGRAM(s)/deciliter  lactulose Syrup 10 Gram(s) Oral daily PRN constipation  magnesium hydroxide Suspension 30 milliLiter(s) Oral daily PRN Constipation  melatonin 3 milliGRAM(s) Oral at bedtime PRN Insomnia  ondansetron Injectable 4 milliGRAM(s) IV Push every 8 hours PRN Nausea and/or Vomiting      LABS:                        11.9   17.81 )-----------( 205 ( 12 Jun 2025 11:20 )             37.8     Hgb Trend: 11.9<--, 12.3<--, 11.5<--, 12.6<--  06-12    140  |  106  |  21  ----------------------------<  227[H]  5.0   |  21[L]  |  0.64    Ca    8.9      12 Jun 2025 11:20      Creatinine Trend: 0.64<--, 0.59<--, 0.70<--, 0.90<--    Urinalysis Basic - ( 12 Jun 2025 11:20 )    Color: x / Appearance: x / SG: x / pH: x  Gluc: 227 mg/dL / Ketone: x  / Bili: x / Urobili: x   Blood: x / Protein: x / Nitrite: x   Leuk Esterase: x / RBC: x / WBC x   Sq Epi: x / Non Sq Epi: x / Bacteria: x            MICROBIOLOGY:     Culture - Blood (collected 11 Jun 2025 05:45)  Source: Blood Blood-Peripheral  Preliminary Report (12 Jun 2025 12:02):    No growth at 24 hours    Culture - Sputum (collected 10 Christopher 2025 07:28)  Source: Sputum Sputum  Gram Stain (10 Christopher 2025 16:43):    Few polymorphonuclear leukocytes per low power field    Few Squamous epithelial cells per low power field    Few Gram Positive Cocci in Clusters per oil power field    Rare Gram Negative Rods per oil power field  Final Report (12 Jun 2025 06:45):    Numerous Methicillin Resistant Staphylococcus aureus    Normal Respiratory Jessica absent  Organism: Methicillin resistant Staphylococcus aureus (12 Jun 2025 06:45)  Organism: Methicillin resistant Staphylococcus aureus (12 Jun 2025 06:45)    Culture - Urine (collected 09 Jun 2025 17:56)  Source: Clean Catch Clean Catch (Midstream)  Final Report (10 Christopher 2025 19:09):    <10,000 CFU/mL Normal Urogenital Jessica    Culture - Sputum (collected 09 Jun 2025 14:19)  Source: Sputum Sputum  Gram Stain (10 Christopher 2025 00:18):    Moderate polymorphonuclear leukocytes per low power field    Few Squamous epithelial cells per low power field    Few Gram Positive Cocci in Clusters per oil power field    Rare Gram Negative Rods per oil power field  Final Report (11 Jun 2025 14:51):    Moderate Methicillin Resistant Staphylococcus aureus    Commensal jessica consistent with body site  Organism: Methicillin resistant Staphylococcus aureus (11 Jun 2025 14:51)  Organism: Methicillin resistant Staphylococcus aureus (11 Jun 2025 14:51)        RADIOLOGY:  [ ] Reviewed and interpreted by me    PULMONARY FUNCTION TESTS:    EKG:

## 2025-06-12 NOTE — PROGRESS NOTE ADULT - TIME BILLING
reviewing documentation, reviewing and interpreting labs/imaging, interviewing and examining patient, discussing plan of care with patient and daughter on the phone, documentation, coordinating care with ACP

## 2025-06-12 NOTE — PROGRESS NOTE ADULT - SUBJECTIVE AND OBJECTIVE BOX
[facility] Division of Hospital Medicine  Matilde Bellamy DO  [employer]    Patient is a 74y old  Female who presents with a chief complaint of SOB (12 Jun 2025 15:14)        SUBJECTIVE / OVERNIGHT EVENTS: overall feeling better, brownish sputum now.       MEDICATIONS  (STANDING):  albuterol    0.083% 2.5 milliGRAM(s) Nebulizer every 6 hours  apixaban 5 milliGRAM(s) Oral two times a day  artificial  tears Solution 1 Drop(s) Both EYES every 4 hours  budesonide    Inhalation Suspension 1 milliGRAM(s) Inhalation two times a day  chlorhexidine 2% Cloths 1 Application(s) Topical daily  dextrose 5%. 1000 milliLiter(s) (50 mL/Hr) IV Continuous <Continuous>  dextrose 5%. 1000 milliLiter(s) (100 mL/Hr) IV Continuous <Continuous>  dextrose 50% Injectable 25 Gram(s) IV Push once  dextrose 50% Injectable 12.5 Gram(s) IV Push once  dextrose 50% Injectable 25 Gram(s) IV Push once  famotidine    Tablet 20 milliGRAM(s) Oral daily  fluticasone propionate/ salmeterol 250-50 MICROgram(s) Diskus 1 Dose(s) Inhalation two times a day  formoterol for nebulization 20 MICROGram(s) Nebulizer every 12 hours  glucagon  Injectable 1 milliGRAM(s) IntraMuscular once  insulin glargine Injectable (LANTUS) 32 Unit(s) SubCutaneous at bedtime  insulin lispro (ADMELOG) corrective regimen sliding scale   SubCutaneous three times a day before meals  lacosamide 100 milliGRAM(s) Oral two times a day  levETIRAcetam 1000 milliGRAM(s) Oral two times a day  mexiletine 200 milliGRAM(s) Oral three times a day  mineral oil 30 milliLiter(s) Oral daily  montelukast 10 milliGRAM(s) Oral at bedtime  mupirocin 2% Nasal 1 Application(s) Both Nostrils two times a day  NIFEdipine XL 60 milliGRAM(s) Oral daily  Ohtuvayre 3 mg/2.5 mL Inhalation 3 milliGRAM(s) 3 milliGRAM(s) Nebulizer every 12 hours  pantoprazole    Tablet 40 milliGRAM(s) Oral before breakfast  polyethylene glycol 3350 17 Gram(s) Oral daily  predniSONE   Tablet 40 milliGRAM(s) Oral daily  rosuvastatin 5 milliGRAM(s) Oral at bedtime  senna 2 Tablet(s) Oral at bedtime  sertraline 50 milliGRAM(s) Oral daily  silver sulfADIAZINE 1% Cream 1 Application(s) Topical daily  sodium chloride 0.9%. 1000 milliLiter(s) (75 mL/Hr) IV Continuous <Continuous>  sodium chloride 7% Inhalation 4 milliLiter(s) Inhalation every 12 hours  tiotropium 2.5 MICROgram(s) Inhaler 2 Puff(s) Inhalation daily  vancomycin  IVPB 750 milliGRAM(s) IV Intermittent every 12 hours    MEDICATIONS  (PRN):  dextrose Oral Gel 15 Gram(s) Oral once PRN Blood Glucose LESS THAN 70 milliGRAM(s)/deciliter  lactulose Syrup 10 Gram(s) Oral daily PRN constipation  magnesium hydroxide Suspension 30 milliLiter(s) Oral daily PRN Constipation  melatonin 3 milliGRAM(s) Oral at bedtime PRN Insomnia  ondansetron Injectable 4 milliGRAM(s) IV Push every 8 hours PRN Nausea and/or Vomiting      Vital Signs Last 24 Hrs  T(C): 36.4 (12 Jun 2025 11:02), Max: 36.9 (11 Jun 2025 16:32)  T(F): 97.5 (12 Jun 2025 11:02), Max: 98.5 (12 Jun 2025 05:10)  HR: 51 (12 Jun 2025 13:29) (46 - 78)  BP: 121/66 (12 Jun 2025 13:29) (112/63 - 136/74)  BP(mean): --  RR: 18 (12 Jun 2025 13:29) (18 - 18)  SpO2: 95% (12 Jun 2025 13:29) (91% - 99%)    Parameters below as of 12 Jun 2025 13:29  Patient On (Oxygen Delivery Method): room air      CAPILLARY BLOOD GLUCOSE      POCT Blood Glucose.: 251 mg/dL (12 Jun 2025 11:22)  POCT Blood Glucose.: 143 mg/dL (12 Jun 2025 07:37)  POCT Blood Glucose.: 159 mg/dL (11 Jun 2025 21:00)  POCT Blood Glucose.: 306 mg/dL (11 Jun 2025 17:14)    I&O's Summary    11 Jun 2025 07:01  -  12 Jun 2025 07:00  --------------------------------------------------------  IN: 240 mL / OUT: 1050 mL / NET: -810 mL    12 Jun 2025 07:01  -  12 Jun 2025 15:42  --------------------------------------------------------  IN: 220 mL / OUT: 200 mL / NET: 20 mL          PHYSICAL EXAM:  GENERAL: NAD, breathing normal  EYES: conjunctiva and sclera clear  NECK: supple, No JVD  CHEST/LUNG: CTA b/l  HEART: S1 S2 RRR  ABDOMEN: +BS Soft, NT/ND  EXTREMITIES:  2+ DP Pulses, No c/c. no LE edema  NEUROLOGY: AAOx3, no facial droop, moving all extremities     LABS:                        11.9   17.81 )-----------( 205      ( 12 Jun 2025 11:20 )             37.8     06-12    140  |  106  |  21  ----------------------------<  227[H]  5.0   |  21[L]  |  0.64    Ca    8.9      12 Jun 2025 11:20            Urinalysis Basic - ( 12 Jun 2025 11:20 )    Color: x / Appearance: x / SG: x / pH: x  Gluc: 227 mg/dL / Ketone: x  / Bili: x / Urobili: x   Blood: x / Protein: x / Nitrite: x   Leuk Esterase: x / RBC: x / WBC x   Sq Epi: x / Non Sq Epi: x / Bacteria: x        RADIOLOGY & ADDITIONAL TESTS:    Imaging Personally Reviewed:  Consultant(s) Notes Reviewed:    Care Discussed with Consultants/Other Providers:

## 2025-06-12 NOTE — PROGRESS NOTE ADULT - ASSESSMENT
Patient is a 73 yo F w/ asthma (chronic methypred 8mg PO daily), bronchiectasis, allergic rhinitis, recurrent PNA, SDB ( on CPAP), tracheomalacia s/p tracheoplasty, tracheobronchomalacia, pAfib (Eliquis), colorectal ca s/p colostomy, aortic dissection s/p ascending aorta repair who p/w cough, SOB and hemoptysis.     Outpatient sputum cultures from 6/2 growing MRSA    Now with MRSA bacteremia as well    Home Airway clearance regimen: Albuterol q6h -> Chest Vest -> Perforomist BID  -> Pulmicort BID, - > Ohtuvayre 2.5 BID - > HyperSal 7% q12h. Patient also on Breo Ellipta 200 at 1 inhalation QD, Incruse Ellipta 1 puff QD, and on Te[place]sp[place] airway     #SOB - Likely 2/2 COPD exacerbation/PNA  #MRSA PNA  #MRSA bactermia  #Hemoptysis  #Acute Asthma exacerbation  #SDB - on CPAP  - c/w Airway clearance regimen as possible Albuterol q6h -> Chest Vest -> Perforomist BID (patient brought in) -> Pulmicort BID, - > Ohtuvayre 2.5 BID - > HyperSal 7% q12h.  - Please collect all hemoptysis at bedside in cup. If greater than 150 cc in 24 hours or greater than 100 cc in 1 hour, please call Pulm/MICU immediately.   - c/w Eliquis for now  - Patient also on Breo Ellipta 200 at 1 inhalation QD, Incruse Ellipta 1 puff QD. Can use Advair/Spiriva while admitted  - Tezspire outpatient   - Agree with Vanc. f/u cultures. Check TTE. f/u MRI spine. f/u ID recs  - c/w home CPAP and Chest Vest for use  - Can decrease steroids back to home dose    Shaan Shah MD  Pulmonary & Critical Care

## 2025-06-12 NOTE — CONSULT NOTE ADULT - SUBJECTIVE AND OBJECTIVE BOX
p ([id number])     Shirley Bloom  HPI: 74F on eliquis for afib, many medical problems, h/o pelvic instrumentation for fracture many years ago, chronic LBP. Admitted for pneumonia now resolving, had LBP similar to chronic, no radicular pain or red flag sx. MR done w/ L4/5 & L5/S1 discs causing moderate foraminal stenosis.     Exam: Intact.    =====================  PAST MEDICAL HISTORY   Seizure    DVT (deep venous thrombosis)    TIA (transient ischemic attack)    COPD (chronic obstructive pulmonary disease)    Atrial fibrillation    History of COPD    Afib    Aortic valve replaced    Epilepsy    Asthma    DM (diabetes mellitus)    History of seizure disorder    History of TIAs    HTN (hypertension)    Bronchiectasis    Colon cancer      PAST SURGICAL HISTORY   History of partial hysterectomy    H/O total knee replacement, bilateral    History of sinus surgery    Exostosis of orbit, left    H/O pelvic surgery    History of surgery    History of tracheomalacia    S/P bronchoscopy    Rectal bleeding    S/P TAVR (transcatheter aortic valve replacement)    S/P aortic valve replacement    No significant past surgical history    S/P colostomy    S/P AVR (aortic valve replacement)      Valium (Short breath)  OxyContin (Unknown)  Valium (Unknown)  Avelox (Short breath; Pruritus)  penicillin (Anaphylaxis)  Dilaudid (Short breath)  shellfish (Anaphylaxis)  ampicillin (Unknown)  codeine (Short breath)  Percocet (Unknown)  tetanus toxoid (Short breath)  cefepime (Anaphylaxis)  cefepime (Short breath (Severe))  codeine (Unknown)  iodine (Short breath; Swelling)  aspirin (Short breath)  meropenem (Unknown)      MEDICATIONS:  Antibiotics:  vancomycin  IVPB 750 milliGRAM(s) IV Intermittent every 12 hours    Neuro:  lacosamide 100 milliGRAM(s) Oral two times a day  levETIRAcetam 1000 milliGRAM(s) Oral two times a day  melatonin 3 milliGRAM(s) Oral at bedtime PRN  ondansetron Injectable 4 milliGRAM(s) IV Push every 8 hours PRN  sertraline 50 milliGRAM(s) Oral daily    Other:  albuterol    0.083% 2.5 milliGRAM(s) Nebulizer every 6 hours  budesonide    Inhalation Suspension 1 milliGRAM(s) Inhalation two times a day  dextrose 5%. 1000 milliLiter(s) IV Continuous <Continuous>  dextrose 5%. 1000 milliLiter(s) IV Continuous <Continuous>  dextrose 50% Injectable 25 Gram(s) IV Push once  dextrose 50% Injectable 12.5 Gram(s) IV Push once  dextrose 50% Injectable 25 Gram(s) IV Push once  dextrose Oral Gel 15 Gram(s) Oral once PRN  famotidine    Tablet 20 milliGRAM(s) Oral daily  fluticasone propionate/ salmeterol 250-50 MICROgram(s) Diskus 1 Dose(s) Inhalation two times a day  formoterol for nebulization 20 MICROGram(s) Nebulizer every 12 hours  glucagon  Injectable 1 milliGRAM(s) IntraMuscular once  insulin glargine Injectable (LANTUS) 32 Unit(s) SubCutaneous at bedtime  insulin lispro (ADMELOG) corrective regimen sliding scale   SubCutaneous three times a day before meals  lactulose Syrup 10 Gram(s) Oral daily PRN  magnesium hydroxide Suspension 30 milliLiter(s) Oral daily PRN  mexiletine 200 milliGRAM(s) Oral three times a day  mineral oil 30 milliLiter(s) Oral daily  montelukast 10 milliGRAM(s) Oral at bedtime  NIFEdipine XL 60 milliGRAM(s) Oral daily  pantoprazole    Tablet 40 milliGRAM(s) Oral before breakfast  polyethylene glycol 3350 17 Gram(s) Oral daily  predniSONE   Tablet 40 milliGRAM(s) Oral daily  rosuvastatin 5 milliGRAM(s) Oral at bedtime  senna 2 Tablet(s) Oral at bedtime  sodium chloride 0.9%. 1000 milliLiter(s) IV Continuous <Continuous>  sodium chloride 7% Inhalation 4 milliLiter(s) Inhalation every 12 hours  tiotropium 2.5 MICROgram(s) Inhaler 2 Puff(s) Inhalation daily      SOCIAL HISTORY:   Occupation:   Marital Status:     FAMILY HISTORY:  Family history of asthma    Family history of breast cancer (Sibling)    Family history of diabetes mellitus type II        ROS: Negative except per HPI    LABS:                          11.9   17.81 )-----------( 205      ( 12 Jun 2025 11:20 )             37.8     06-12    140  |  106  |  21  ----------------------------<  227[H]  5.0   |  21[L]  |  0.64    Ca    8.9      12 Jun 2025 11:20

## 2025-06-13 LAB
ALBUMIN SERPL ELPH-MCNC: 3.2 G/DL — LOW (ref 3.3–5)
ALP SERPL-CCNC: 67 U/L — SIGNIFICANT CHANGE UP (ref 40–120)
ALT FLD-CCNC: 27 U/L — SIGNIFICANT CHANGE UP (ref 10–45)
ANION GAP SERPL CALC-SCNC: 13 MMOL/L — SIGNIFICANT CHANGE UP (ref 5–17)
AST SERPL-CCNC: 19 U/L — SIGNIFICANT CHANGE UP (ref 10–40)
BILIRUB SERPL-MCNC: 0.2 MG/DL — SIGNIFICANT CHANGE UP (ref 0.2–1.2)
BUN SERPL-MCNC: 18 MG/DL — SIGNIFICANT CHANGE UP (ref 7–23)
CALCIUM SERPL-MCNC: 8.7 MG/DL — SIGNIFICANT CHANGE UP (ref 8.4–10.5)
CHLORIDE SERPL-SCNC: 102 MMOL/L — SIGNIFICANT CHANGE UP (ref 96–108)
CO2 SERPL-SCNC: 21 MMOL/L — LOW (ref 22–31)
CREAT SERPL-MCNC: 0.59 MG/DL — SIGNIFICANT CHANGE UP (ref 0.5–1.3)
CRP SERPL-MCNC: 17 MG/L — HIGH (ref 0–4)
CULTURE RESULTS: ABNORMAL
EGFR: 93 ML/MIN/1.73M2 — SIGNIFICANT CHANGE UP
EGFR: 93 ML/MIN/1.73M2 — SIGNIFICANT CHANGE UP
ERYTHROCYTE [SEDIMENTATION RATE] IN BLOOD: 40 MM/HR — HIGH (ref 0–20)
GLUCOSE BLDC GLUCOMTR-MCNC: 150 MG/DL — HIGH (ref 70–99)
GLUCOSE BLDC GLUCOMTR-MCNC: 219 MG/DL — HIGH (ref 70–99)
GLUCOSE BLDC GLUCOMTR-MCNC: 265 MG/DL — HIGH (ref 70–99)
GLUCOSE BLDC GLUCOMTR-MCNC: 279 MG/DL — HIGH (ref 70–99)
GLUCOSE BLDC GLUCOMTR-MCNC: 327 MG/DL — HIGH (ref 70–99)
GLUCOSE SERPL-MCNC: 279 MG/DL — HIGH (ref 70–99)
HCT VFR BLD CALC: 41 % — SIGNIFICANT CHANGE UP (ref 34.5–45)
HGB BLD-MCNC: 12.5 G/DL — SIGNIFICANT CHANGE UP (ref 11.5–15.5)
MCHC RBC-ENTMCNC: 24 PG — LOW (ref 27–34)
MCHC RBC-ENTMCNC: 30.5 G/DL — LOW (ref 32–36)
MCV RBC AUTO: 78.8 FL — LOW (ref 80–100)
NRBC BLD AUTO-RTO: 0 /100 WBCS — SIGNIFICANT CHANGE UP (ref 0–0)
PLATELET # BLD AUTO: 182 K/UL — SIGNIFICANT CHANGE UP (ref 150–400)
POTASSIUM SERPL-MCNC: 4.4 MMOL/L — SIGNIFICANT CHANGE UP (ref 3.5–5.3)
POTASSIUM SERPL-SCNC: 4.4 MMOL/L — SIGNIFICANT CHANGE UP (ref 3.5–5.3)
PROT SERPL-MCNC: 5.8 G/DL — LOW (ref 6–8.3)
RBC # BLD: 5.2 M/UL — SIGNIFICANT CHANGE UP (ref 3.8–5.2)
RBC # FLD: 17.2 % — HIGH (ref 10.3–14.5)
SODIUM SERPL-SCNC: 136 MMOL/L — SIGNIFICANT CHANGE UP (ref 135–145)
SPECIMEN SOURCE: SIGNIFICANT CHANGE UP
VANCOMYCIN TROUGH SERPL-MCNC: 10.5 UG/ML — SIGNIFICANT CHANGE UP (ref 10–20)
WBC # BLD: 19.12 K/UL — HIGH (ref 3.8–10.5)
WBC # FLD AUTO: 19.12 K/UL — HIGH (ref 3.8–10.5)

## 2025-06-13 PROCEDURE — 99233 SBSQ HOSP IP/OBS HIGH 50: CPT

## 2025-06-13 PROCEDURE — 99223 1ST HOSP IP/OBS HIGH 75: CPT

## 2025-06-13 PROCEDURE — G0545: CPT

## 2025-06-13 RX ORDER — INSULIN LISPRO 100 U/ML
2 INJECTION, SOLUTION INTRAVENOUS; SUBCUTANEOUS
Refills: 0 | Status: DISCONTINUED | OUTPATIENT
Start: 2025-06-13 | End: 2025-06-21

## 2025-06-13 RX ORDER — INSULIN LISPRO 100 U/ML
4 INJECTION, SOLUTION INTRAVENOUS; SUBCUTANEOUS
Refills: 0 | Status: DISCONTINUED | OUTPATIENT
Start: 2025-06-13 | End: 2025-06-21

## 2025-06-13 RX ORDER — INSULIN LISPRO 100 U/ML
INJECTION, SOLUTION INTRAVENOUS; SUBCUTANEOUS
Refills: 0 | Status: DISCONTINUED | OUTPATIENT
Start: 2025-06-13 | End: 2025-06-15

## 2025-06-13 RX ADMIN — Medication 1 DROP(S): at 13:22

## 2025-06-13 RX ADMIN — LACOSAMIDE 100 MILLIGRAM(S): 150 TABLET, FILM COATED ORAL at 18:15

## 2025-06-13 RX ADMIN — Medication 2 TABLET(S): at 21:23

## 2025-06-13 RX ADMIN — MEXILETINE HYDROCHLORIDE 200 MILLIGRAM(S): 250 CAPSULE ORAL at 06:02

## 2025-06-13 RX ADMIN — SERTRALINE 50 MILLIGRAM(S): 100 TABLET, FILM COATED ORAL at 13:25

## 2025-06-13 RX ADMIN — PREDNISONE 40 MILLIGRAM(S): 20 TABLET ORAL at 06:03

## 2025-06-13 RX ADMIN — Medication 250 MILLIGRAM(S): at 02:04

## 2025-06-13 RX ADMIN — Medication 4 MILLILITER(S): at 06:03

## 2025-06-13 RX ADMIN — Medication 4 MILLILITER(S): at 18:16

## 2025-06-13 RX ADMIN — Medication 2.5 MILLIGRAM(S): at 06:25

## 2025-06-13 RX ADMIN — Medication 30 MILLILITER(S): at 13:25

## 2025-06-13 RX ADMIN — TIOTROPIUM BROMIDE INHALATION SPRAY 2 PUFF(S): 3.12 SPRAY, METERED RESPIRATORY (INHALATION) at 13:28

## 2025-06-13 RX ADMIN — Medication 1 DROP(S): at 09:23

## 2025-06-13 RX ADMIN — Medication 40 MILLIGRAM(S): at 06:03

## 2025-06-13 RX ADMIN — Medication 1 DROP(S): at 21:22

## 2025-06-13 RX ADMIN — Medication 1 APPLICATION(S): at 13:26

## 2025-06-13 RX ADMIN — INSULIN LISPRO 4 UNIT(S): 100 INJECTION, SOLUTION INTRAVENOUS; SUBCUTANEOUS at 18:14

## 2025-06-13 RX ADMIN — Medication 250 MILLIGRAM(S): at 13:30

## 2025-06-13 RX ADMIN — MEXILETINE HYDROCHLORIDE 200 MILLIGRAM(S): 250 CAPSULE ORAL at 13:38

## 2025-06-13 RX ADMIN — Medication 20 MILLIGRAM(S): at 13:22

## 2025-06-13 RX ADMIN — Medication 1 APPLICATION(S): at 13:19

## 2025-06-13 RX ADMIN — INSULIN LISPRO 4 UNIT(S): 100 INJECTION, SOLUTION INTRAVENOUS; SUBCUTANEOUS at 13:23

## 2025-06-13 RX ADMIN — Medication 1 DROP(S): at 18:14

## 2025-06-13 RX ADMIN — BUDESONIDE 1 MILLIGRAM(S): 0.25 SUSPENSION RESPIRATORY (INHALATION) at 18:17

## 2025-06-13 RX ADMIN — LEVETIRACETAM 1000 MILLIGRAM(S): 10 INJECTION, SOLUTION INTRAVENOUS at 18:15

## 2025-06-13 RX ADMIN — LACTULOSE 10 GRAM(S): 10 SOLUTION ORAL at 13:29

## 2025-06-13 RX ADMIN — MUPIROCIN CALCIUM 1 APPLICATION(S): 20 CREAM TOPICAL at 18:31

## 2025-06-13 RX ADMIN — BUDESONIDE 1 MILLIGRAM(S): 0.25 SUSPENSION RESPIRATORY (INHALATION) at 06:04

## 2025-06-13 RX ADMIN — APIXABAN 5 MILLIGRAM(S): 2.5 TABLET, FILM COATED ORAL at 06:03

## 2025-06-13 RX ADMIN — Medication 2.5 MILLIGRAM(S): at 18:16

## 2025-06-13 RX ADMIN — MONTELUKAST SODIUM 10 MILLIGRAM(S): 10 TABLET ORAL at 21:22

## 2025-06-13 RX ADMIN — FORMOTEROL FUMARATE DIHYDRATE 20 MICROGRAM(S): 20 SOLUTION RESPIRATORY (INHALATION) at 06:05

## 2025-06-13 RX ADMIN — INSULIN GLARGINE-YFGN 32 UNIT(S): 100 INJECTION, SOLUTION SUBCUTANEOUS at 21:23

## 2025-06-13 RX ADMIN — FORMOTEROL FUMARATE DIHYDRATE 20 MICROGRAM(S): 20 SOLUTION RESPIRATORY (INHALATION) at 18:19

## 2025-06-13 RX ADMIN — Medication 1 DROP(S): at 06:05

## 2025-06-13 RX ADMIN — Medication 60 MILLIGRAM(S): at 06:03

## 2025-06-13 RX ADMIN — Medication 1 DOSE(S): at 06:06

## 2025-06-13 RX ADMIN — ROSUVASTATIN CALCIUM 5 MILLIGRAM(S): 20 TABLET, FILM COATED ORAL at 21:22

## 2025-06-13 RX ADMIN — MEXILETINE HYDROCHLORIDE 200 MILLIGRAM(S): 250 CAPSULE ORAL at 21:23

## 2025-06-13 RX ADMIN — INSULIN LISPRO 4: 100 INJECTION, SOLUTION INTRAVENOUS; SUBCUTANEOUS at 13:23

## 2025-06-13 RX ADMIN — LACOSAMIDE 100 MILLIGRAM(S): 150 TABLET, FILM COATED ORAL at 06:02

## 2025-06-13 RX ADMIN — Medication 1 DOSE(S): at 18:17

## 2025-06-13 RX ADMIN — APIXABAN 5 MILLIGRAM(S): 2.5 TABLET, FILM COATED ORAL at 18:14

## 2025-06-13 RX ADMIN — LEVETIRACETAM 1000 MILLIGRAM(S): 10 INJECTION, SOLUTION INTRAVENOUS at 06:03

## 2025-06-13 RX ADMIN — Medication 2.5 MILLIGRAM(S): at 13:36

## 2025-06-13 RX ADMIN — INSULIN LISPRO 3: 100 INJECTION, SOLUTION INTRAVENOUS; SUBCUTANEOUS at 18:14

## 2025-06-13 NOTE — CHART NOTE - NSCHARTNOTEFT_GEN_A_CORE
NUTRITION FOLLOW UP NOTE    PATIENT SEEN FOR: Dietitian consult received for: STAR HF, Nutrition services, assessment, education    SOURCE: [x] Patient  [x] Current Medical Record  [] RN  [] Family/support person at bedside  [] Patient unavailable/inappropriate  [] Other:    CHART REVIEWED/EVENTS NOTED.  [] No changes to nutrition care plan to note  [x] Nutrition Status:  Per chart review:  - PMH: Colon CA, T2DM  - Patient visited today (6/13), and able to provide nutrition history. Per patient, adhering to a carbohydrate controlled diet and reports poor p.o. intake > one year. Patient denies having a set number of meals daily, reports grazing throughout the day. Per chart, shellfish allergy noted; patient confirmed allergy and denies additional food allergies or intolerances at this time. Patient denies micronutrient supplementation prior to admission. Per chart, hx T2DM noted. Patient reports checking her fingersticks before and after each meal. Patient reports her fingersticks typically run high due to chronic steroid use prior to admission.    DIET ORDER:   Diet, Regular:   Consistent Carbohydrate {No Snacks} (CSTCHO)  DASH/TLC {Sodium & Cholesterol Restricted} (DASH)  No Shellfish  Supplement Feeding Modality:  Oral  Glucerna Shake Cans or Servings Per Day:  2       Frequency:  Daily (06-10-25)      CURRENT DIET ORDER IS:  [] Appropriate:  [] Inadequate:  [x] Other: Please see below for recommendations    NUTRITION INTAKE/PROVISION:  [x] PO: Per nursing flowsheets, fluctuating poor/fair p.o. intake, 25-75%. Patient reports continued poor appetite this admission. Patient ordered for oral nutrition supplements BID and requesting additional supplement; interdisciplinary team to be made aware. Offered to review patient's dietary preferences for p.o. optimization, patient declining at this time.  [] Enteral Nutrition:  [] Parenteral Nutrition:    ANTHROPOMETRICS:  Drug Dosing Weight  Height (cm): 154.2 (09 Jun 2025 04:09)  Weight (kg): 70.3 (09 Jun 2025 04:09)  BMI (kg/m2): 29.6 (09 Jun 2025 04:09)    Weights:  - No recent available weights to review.   - Per chart, patient's height 5'0" and current dosing weight 155lbs (6/09/2025). Patient denies height and weight. Per patient she is 5'7" and reports to be < 155lbs. Patient unable to report her current weight, however reports stable weight prior to admission.  - RD unable to perform nutrition focused physical exam during visit, patient observed eating lunch. Mild age related wasting observed, RD to perform physical exam at follow up or as able.    MEDICATIONS:  MEDICATIONS  (STANDING):  albuterol    0.083% 2.5 milliGRAM(s) Nebulizer every 6 hours  apixaban 5 milliGRAM(s) Oral two times a day  artificial  tears Solution 1 Drop(s) Both EYES every 4 hours  budesonide    Inhalation Suspension 1 milliGRAM(s) Inhalation two times a day  chlorhexidine 2% Cloths 1 Application(s) Topical daily  dextrose 5%. 1000 milliLiter(s) (50 mL/Hr) IV Continuous <Continuous>  dextrose 5%. 1000 milliLiter(s) (100 mL/Hr) IV Continuous <Continuous>  dextrose 50% Injectable 12.5 Gram(s) IV Push once  dextrose 50% Injectable 25 Gram(s) IV Push once  dextrose 50% Injectable 25 Gram(s) IV Push once  famotidine    Tablet 20 milliGRAM(s) Oral daily  fluticasone propionate/ salmeterol 250-50 MICROgram(s) Diskus 1 Dose(s) Inhalation two times a day  formoterol for nebulization 20 MICROGram(s) Nebulizer every 12 hours  glucagon  Injectable 1 milliGRAM(s) IntraMuscular once  insulin glargine Injectable (LANTUS) 32 Unit(s) SubCutaneous at bedtime  insulin lispro (ADMELOG) corrective regimen sliding scale   SubCutaneous three times a day before meals  insulin lispro Injectable (ADMELOG) 4 Unit(s) SubCutaneous before lunch  insulin lispro Injectable (ADMELOG) 4 Unit(s) SubCutaneous before dinner  lacosamide 100 milliGRAM(s) Oral two times a day  levETIRAcetam 1000 milliGRAM(s) Oral two times a day  mexiletine 200 milliGRAM(s) Oral three times a day  mineral oil 30 milliLiter(s) Oral daily  montelukast 10 milliGRAM(s) Oral at bedtime  mupirocin 2% Nasal 1 Application(s) Both Nostrils two times a day  NIFEdipine XL 60 milliGRAM(s) Oral daily  Ohtuvayre 3 mg/2.5 mL Inhalation 3 milliGRAM(s) 3 milliGRAM(s) Nebulizer every 12 hours  pantoprazole    Tablet 40 milliGRAM(s) Oral before breakfast  polyethylene glycol 3350 17 Gram(s) Oral daily  predniSONE   Tablet 40 milliGRAM(s) Oral daily  rosuvastatin 5 milliGRAM(s) Oral at bedtime  senna 2 Tablet(s) Oral at bedtime  sertraline 50 milliGRAM(s) Oral daily  silver sulfADIAZINE 1% Cream 1 Application(s) Topical daily  sodium chloride 0.9%. 1000 milliLiter(s) (75 mL/Hr) IV Continuous <Continuous>  sodium chloride 7% Inhalation 4 milliLiter(s) Inhalation every 12 hours  tiotropium 2.5 MICROgram(s) Inhaler 2 Puff(s) Inhalation daily  vancomycin  IVPB 750 milliGRAM(s) IV Intermittent every 12 hours    MEDICATIONS  (PRN):  dextrose Oral Gel 15 Gram(s) Oral once PRN Blood Glucose LESS THAN 70 milliGRAM(s)/deciliter  lactulose Syrup 10 Gram(s) Oral daily PRN constipation  magnesium hydroxide Suspension 30 milliLiter(s) Oral daily PRN Constipation  melatonin 3 milliGRAM(s) Oral at bedtime PRN Insomnia  ondansetron Injectable 4 milliGRAM(s) IV Push every 8 hours PRN Nausea and/or Vomiting      NUTRITIONALLY PERTINENT LABS:  06-13 Na136 mmol/L Glu 279 mg/dL[H] K+ 4.4 mmol/L Cr  0.59 mg/dL BUN 18 mg/dL 06-13 Alb 3.2 g/dL[L]06-13 ALT 27 U/L AST 19 U/L Alkaline Phosphatase 67 U/L    A1C with Estimated Average Glucose Result: 8.5 % (05-07-25 @ 13:48)  A1C with Estimated Average Glucose Result: 8.0 % (04-18-25 @ 10:27)  A1C with Estimated Average Glucose Result: 7.7 % (02-21-25 @ 08:41)  A1C with Estimated Average Glucose Result: 7.8 % (01-09-25 @ 05:16)          Finger Sticks:  POCT Blood Glucose.: 265 mg/dL (06-13 @ 11:12)  POCT Blood Glucose.: 150 mg/dL (06-13 @ 07:41)  POCT Blood Glucose.: 278 mg/dL (06-12 @ 21:26)  POCT Blood Glucose.: 322 mg/dL (06-12 @ 17:14)      NUTRITIONALLY PERTINENT MEDICATIONS/LABS:  [x] Reviewed  [x] Relevant notes on medications/labs:  Pertinent Medications:   - Insulin (Lispro, ADMELOG)  - Magnesium Hydroxide  - Steroid in use, may elevate blood glucose     Pertinent Labs:  - POCT  (H), Glucose 227 (H) 6/12. Hyperglycemia noted during admission, recent fingersticks: 143-310mg/dL (6/11-6/13)  - BNP 03942 (H) 6/09    EDEMA:  [x] Reviewed: No edema noted per nursing flowsheets.   [] Relevant notes:    GI/ I&O:  [x] Reviewed  [x] Relevant notes: + colostomy bag   [] Other:    SKIN:   [] No pressure injuries documented, per nursing flowsheet  [x] Pressure injury previously noted: Per wound care 6/11, "Rt. elbow contracted hyperpigmented. Sacral hyperpigmented, scar tissue". No pressure injury noted.  [] Change in pressure injury documentation:  [] Other:    ESTIMATED NEEDS:  [] No change:  [x] Updated:  Energy: 4163-3745 kcal/day (28-32 kcal/kg)  Protein: 74-86 g/day (1.2-1.4 g/kg)  Fluid:   ml/day or [x] defer to team  Based on: Ideal body weight 61.3kg    NUTRITION DIAGNOSIS:  [] Prior Dx:  [x] New Dx: Inadequate protein-energy intake related to poor oral intake as evidenced by patient likely meeting </= 75% of estimated energy requirements for >/= 1 month  Goal: Patient to meet > 75% estimated energy requirements     EDUCATION:  [x] Yes: Reviewed CHF diet education. Discussed Na restriction, foods high in Na to avoid, reading food labels, tips for limiting Na in your diet. Reviewed daily weights, Wt gain parameters to contact MD. Discussed Na intake in relation to fluid retention, edema and Wt gain. Patient verbalized understanding and accepted Heart Failure Nutrition Therapy Handout.  RD remains available for diet education review.     Handouts provided:  - Heart Failure Nutrition Therapy  - Heart Healthy Label Reading Tips  - Heart Healthy Shopping Tips  - Sodium (salt) Content of Foods       NUTRITION CARE PLAN:  1. Diet: Continue Consistent Carbohydrate Diet, recommend d/c DASH/ TLC and liberalize to a Low Sodium Diet for p.o. optimization   2. Supplements: Recommend Glucerna Shakes Vanilla TID (660kcals, 30g protein) for p.o. optimization and patient's request  3. Multivitamin/mineral supplementation: Defer micronutrient supplementation to medical team   4. Recommend re-check patient's height and weight this admission  5. Monitor routine weights, nutrition related labs, fingersticks, diet advancements, p.o. intake and tolerance, oral nutrition supplement compliance, and skin integrity    [] Achieved - Continue current nutrition intervention(s)  [] Current medical condition precludes nutrition intervention at this time.    MONITORING AND EVALUATION:   RD remains available upon request and will follow up per protocol.    Ashlee Nunes, MS, RD, CDN   Available on MS teams

## 2025-06-13 NOTE — CONSULT NOTE ADULT - ASSESSMENT
Shirley is a 73 yo F w/ asthma (chronic methypred 8mg PO daily), bronchiectasis, allergic rhinitis, recurrent PNA, SDB ( on CPAP), tracheomalacia s/p tracheoplasty, tracheobronchomalacia, pAfib (Eliquis), colorectal ca s/p colostomy, aortic dissection s/p ascending aorta repair who p/w cough, SOB and hemoptysis.     #SOB: Presented to OP Pulm office on 6/2 and those cultures were growing MRSA  Blood cx here positive for MRSA as well with clearance of blood cultures on 6/11  SOB thought to be 2/2 PNA/COPD exacerbation and pt feels markedly improved after treatment. Remains on RA this AM  Inhalers as per pulm   Steroids being decreased back to home dose     #MRSA bacteremia: Blood cx as noted above. Repeat cultures on 6/11 negative  However, given she has a bioprosthetic valve, a SAFIA indicated to ensure her infection has not seeded her valve  Would order SAFIA, plan for Monday, NPO Sunday night. discussed with primary team     #PVCs: On chronic mexiletine therapy, continue   - Tele monitoring     #Dissection s/p repair: type A dissection s/p bioAVR and Bental in 2022 then severe AS s/p TAVR 9/2023:  - On Labetalol at home, currently on hold   - BPs and HR stable

## 2025-06-13 NOTE — CONSULT NOTE ADULT - CONVERSATION DETAILS
Shirley Riley was able to articulate her understanding of her current and past medical issues during our conversation. She knows she is presently receiving treatment for pneumonia, and her previous delirium appears to have resolved. Shirley shared that she has undergone numerous procedures and interventions, including multiple ICU stays, and her primary goal is to live as long as possible. "I would like to be 89 years old." She stated unequivocally that she would only consider withdrawing life support if she lacked brain stem reflexes. Otherwise, she wishes for her body to be left alone, as she believes it will improve, as it has in the past during medical crises such as an aortic dissection or tracheomalacia. Consequently, she remains designated as full code.    Her daughter expressed interest in having Dr. Sierra evaluate her mother for potential focal seizures, a concern still present despite resolving delirium. Additionally, Shirley and Zoe wanted confirmation regarding community services, specifically the [facility] program, which Zoe confirmed Shirley is enrolled in. Zoe emphasized the importance of Shirley consistently using her chest vest and nocturnal CPAP, and she requested a referral for physical therapy. The CPAP order was confirmed, and I placed an order for the chest vest.    I communicated my discussions with Shirley and Zoe to Dr. Bellamy and made sure to inform [facility] about Shirley's situation and treatment updates before discharge, ensuring continuity of care post-hospitalization. The patient was able to articulate her understanding of her current and past medical issues during our conversation. She knows she is presently receiving treatment for pneumonia, and her previous delirium appears to have resolved. Shirley shared that she has undergone numerous procedures and interventions, including multiple ICU stays, and her primary goal is to live as long as possible. "I would like to be 89 years old." She stated unequivocally that she would only consider withdrawing life support if she lacked "brain stem reflexes." Otherwise, she wishes for her body to be left alone, as she believes it will improve, as it has in the past during medical crises such as an aortic dissection or tracheomalacia. Consequently, she remains designated as full code.    The patient expressed interest in having Dr. Sierra evaluating her for potential focal seizures, a concern still present despite resolving delirium. Additionally, Zoe confirmed the patient was just enrolled in [facility] program. Zoe emphasized the importance of Shirley consistently using her chest vest and nocturnal CPAP, and she requested a referral for physical therapy. The CPAP order was confirmed, and I placed an order for the chest vest.    I communicated my discussions with Shirley and Zoe to Dr. Bellamy and suggested informing [facility] about Shirley's situation and treatment updates before discharge, ensuring continuity of care post-hospitalization.    45' were spent in GOC. 16' were spent in ACP.

## 2025-06-13 NOTE — CONSULT NOTE ADULT - SUBJECTIVE AND OBJECTIVE BOX
[facility] - Le Jung, Jim Pablo, Sridhar Newsome  Office Number: [phone]    Initial Consult Note    CHIEF COMPLAINT: Patient is a 74y old  Female who presents with a chief complaint of SOB       HPI:  75 yo F with a PMH of Epilepsy on Keppra/lacosamide, COPD, asthma  (on CPAP at home, on chronic steroids), DM2, bronchiectasis, tracheomalacia s/p tracheloplasty, HTN, Hx of dissection of descending thoracic aorta, hx of aortic aneurysm, Type B dissection (7/2024), medically managed initially as she did not meet criteria for surgery at that time), evaluated by Dr. 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     She reports that patient intermittently confused since most recent hospitalization.  She noted worsening productive cough. Also with N/V- non-bloody, non-bilious. No fevers at home. She was recently started on Bactrim by her outpatient pulmonologist, Dr. Allison, for MRSA in sputum.    Per report, patient had ?hemoptysis during CT chest, however, not collected.      Now feeling markedly improved. Remains on RA  Sputum and blood cx positive for MRSA  Repeat 6/11 cleared      PAST MEDICAL & SURGICAL HISTORY:  Seizure  x 1 1/7/18      DVT (deep venous thrombosis)  15-20 years ago, took coumadin      TIA (transient ischemic attack)  multiple, last 5 years ago - presents as right-sided weakness      COPD (chronic obstructive pulmonary disease)      Atrial fibrillation      History of COPD      Afib      Aortic valve replaced      Epilepsy      Asthma      DM (diabetes mellitus)      History of seizure disorder      History of TIAs      HTN (hypertension)      Bronchiectasis      Colon cancer      History of partial hysterectomy  30 years ago - fibroids      H/O total knee replacement, bilateral  5 years ago      History of sinus surgery  multiple sinus surgeries      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      H/O pelvic surgery  5 years ago - s/p fracture      History of tracheomalacia  2015 - attempted tracheal stenting ([facility])- course complicated by obstruction, respiratory failure, multiple CPR attempts -  stent discontinued; 10/20/2016 Tracheobronchoplasty (Prolene Mesh) performed at [facility] by Dr Zapien      S/P bronchoscopy  6/5/2018 - [facility] (Dr Zapien) no evidence of tracheobronchomalacia in trachea or bronchial tubes      Rectal bleeding  exam under anesthesia ([facility]) 2/2018      S/P TAVR (transcatheter aortic valve replacement)      S/P aortic valve replacement      S/P colostomy      S/P AVR (aortic valve replacement)          SOCIAL HISTORY:  No tobacco, ethanol, or drug abuse.    FAMILY HISTORY:  Family history of asthma    Family history of breast cancer (Sibling)    Family history of diabetes mellitus type II      No family history of acute MI or sudden cardiac death.    MEDICATIONS  (STANDING):  albuterol    0.083% 2.5 milliGRAM(s) Nebulizer every 6 hours  apixaban 5 milliGRAM(s) Oral two times a day  artificial  tears Solution 1 Drop(s) Both EYES every 4 hours  budesonide    Inhalation Suspension 1 milliGRAM(s) Inhalation two times a day  chlorhexidine 2% Cloths 1 Application(s) Topical daily  dextrose 5%. 1000 milliLiter(s) (50 mL/Hr) IV Continuous <Continuous>  dextrose 5%. 1000 milliLiter(s) (100 mL/Hr) IV Continuous <Continuous>  dextrose 50% Injectable 12.5 Gram(s) IV Push once  dextrose 50% Injectable 25 Gram(s) IV Push once  dextrose 50% Injectable 25 Gram(s) IV Push once  famotidine    Tablet 20 milliGRAM(s) Oral daily  fluticasone propionate/ salmeterol 250-50 MICROgram(s) Diskus 1 Dose(s) Inhalation two times a day  formoterol for nebulization 20 MICROGram(s) Nebulizer every 12 hours  glucagon  Injectable 1 milliGRAM(s) IntraMuscular once  insulin glargine Injectable (LANTUS) 32 Unit(s) SubCutaneous at bedtime  insulin lispro (ADMELOG) corrective regimen sliding scale   SubCutaneous three times a day before meals  insulin lispro Injectable (ADMELOG) 4 Unit(s) SubCutaneous before lunch  insulin lispro Injectable (ADMELOG) 4 Unit(s) SubCutaneous before dinner  lacosamide 100 milliGRAM(s) Oral two times a day  levETIRAcetam 1000 milliGRAM(s) Oral two times a day  mexiletine 200 milliGRAM(s) Oral three times a day  mineral oil 30 milliLiter(s) Oral daily  montelukast 10 milliGRAM(s) Oral at bedtime  mupirocin 2% Nasal 1 Application(s) Both Nostrils two times a day  NIFEdipine XL 60 milliGRAM(s) Oral daily  Ohtuvayre 3 mg/2.5 mL Inhalation 3 milliGRAM(s) 3 milliGRAM(s) Nebulizer every 12 hours  pantoprazole    Tablet 40 milliGRAM(s) Oral before breakfast  polyethylene glycol 3350 17 Gram(s) Oral daily  predniSONE   Tablet 40 milliGRAM(s) Oral daily  rosuvastatin 5 milliGRAM(s) Oral at bedtime  senna 2 Tablet(s) Oral at bedtime  sertraline 50 milliGRAM(s) Oral daily  silver sulfADIAZINE 1% Cream 1 Application(s) Topical daily  sodium chloride 0.9%. 1000 milliLiter(s) (75 mL/Hr) IV Continuous <Continuous>  sodium chloride 7% Inhalation 4 milliLiter(s) Inhalation every 12 hours  tiotropium 2.5 MICROgram(s) Inhaler 2 Puff(s) Inhalation daily  vancomycin  IVPB 750 milliGRAM(s) IV Intermittent every 12 hours    MEDICATIONS  (PRN):  dextrose Oral Gel 15 Gram(s) Oral once PRN Blood Glucose LESS THAN 70 milliGRAM(s)/deciliter  lactulose Syrup 10 Gram(s) Oral daily PRN constipation  magnesium hydroxide Suspension 30 milliLiter(s) Oral daily PRN Constipation  melatonin 3 milliGRAM(s) Oral at bedtime PRN Insomnia  ondansetron Injectable 4 milliGRAM(s) IV Push every 8 hours PRN Nausea and/or Vomiting      Allergies    Valium (Short breath)  OxyContin (Unknown)  Valium (Unknown)  Avelox (Short breath; Pruritus)  penicillin (Anaphylaxis)  Dilaudid (Short breath)  shellfish (Anaphylaxis)  ampicillin (Unknown)  codeine (Short breath)  Percocet (Unknown)  tetanus toxoid (Short breath)  cefepime (Anaphylaxis)  cefepime (Short breath (Severe))  codeine (Unknown)  iodine (Short breath; Swelling)  aspirin (Short breath)  meropenem (Unknown)    Intolerances        REVIEW OF SYSTEMS:    CONSTITUTIONAL: No weakness, fevers or chills  EYES/ENT: No visual changes;  No vertigo or throat pain   NECK: No pain or stiffness  RESPIRATORY: No cough, wheezing, hemoptysis; No shortness of breath  CARDIOVASCULAR: No chest pain or palpitations  GASTROINTESTINAL: No abdominal pain. No nausea, vomiting, or hematemesis; No diarrhea or constipation. No melena or hematochezia.  GENITOURINARY: No dysuria, frequency or hematuria  NEUROLOGICAL: No numbness or weakness  SKIN: No itching or rash  All other review of systems is negative unless indicated above    VITAL SIGNS:   Vital Signs Last 24 Hrs  T(C): 36.9 (13 Jun 2025 05:40), Max: 36.9 (13 Jun 2025 05:40)  T(F): 98.4 (13 Jun 2025 05:40), Max: 98.4 (13 Jun 2025 05:40)  HR: 61 (13 Jun 2025 05:40) (51 - 61)  BP: 126/81 (13 Jun 2025 05:40) (117/60 - 127/73)  BP(mean): --  RR: 18 (13 Jun 2025 05:40) (18 - 18)  SpO2: 97% (13 Jun 2025 05:40) (95% - 99%)    Parameters below as of 13 Jun 2025 05:40  Patient On (Oxygen Delivery Method): room air        I&O's Summary    12 Jun 2025 07:01  -  13 Jun 2025 07:00  --------------------------------------------------------  IN: 220 mL / OUT: 1100 mL / NET: -880 mL        On Exam:    Constitutional: NAD, alert and oriented x 3  Lungs:  Non-labored, breath sounds are clear bilaterally, No wheezing, rales or rhonchi  Cardiovascular: RRR.  S1 and S2 positive.  No murmurs, rubs, gallops or clicks  Gastrointestinal: Bowel Sounds present, soft, nontender.   Lymph: No peripheral edema. No cervical lymphadenopathy.  Neurological: Alert, no focal deficits  Skin: No rashes or ulcers   Psych:  Mood & affect appropriate.    LABS: All Labs Reviewed:                        11.9   17.81 )-----------( 205      ( 12 Jun 2025 11:20 )             37.8                         12.3   17.63 )-----------( 185      ( 11 Jun 2025 09:50 )             39.3     12 Jun 2025 11:20    140    |  106    |  21     ----------------------------<  227    5.0     |  21     |  0.64   11 Jun 2025 09:50    135    |  102    |  19     ----------------------------<  322    4.9     |  20     |  0.59     Ca    8.9        12 Jun 2025 11:20  Ca    9.0        11 Jun 2025 09:50            Blood Culture: Organism --  Gram Stain Blood -- Gram Stain --  Specimen Source Blood Blood  Culture-Blood --    Organism --  Gram Stain Blood -- Gram Stain   Growth in anaerobic bottle: Gram Positive Cocci in Clusters  Specimen Source Blood Blood-Peripheral  Culture-Blood --    Organism Methicillin resistant Staphylococcus aureus  Gram Stain Blood -- Gram Stain   Few polymorphonuclear leukocytes per low power field  Few Squamous epithelial cells per low power field  Few Gram Positive Cocci in Clusters per oil power field  Rare Gram Negative Rods per oil power field  Specimen Source Sputum Sputum  Culture-Blood --    Organism --  Gram Stain Blood -- Gram Stain --  Specimen Source Clean Catch Clean Catch (Midstream)  Culture-Blood --    Organism Methicillin resistant Staphylococcus aureus  Gram Stain Blood -- Gram Stain   Moderate polymorphonuclear leukocytes per low power field  Few Squamous epithelial cells per low power field  Few Gram Positive Cocci in Clusters per oil power field  Rare Gram Negative Rods per oil power field  Specimen Source Sputum Sputum  Culture-Blood --    Organism Methicillin resistant Staphylococcus aureus  Gram Stain Blood -- Gram Stain   Growth in anaerobic bottle: Gram Positive Cocci in Clusters  Specimen Source Blood Blood-Peripheral  Culture-Blood --    Organism Blood Culture PCR  Gram Stain Blood -- Gram Stain   Growth in anaerobic bottle: Gram Positive Cocci in Clusters  Growth in aerobic bottle: Gram Positive Cocci in Clusters  Specimen Source Blood Blood-Peripheral  Culture-Blood --            RADIOLOGY:    EKG: likely AF vs junctional RBBB, LAFB    TTE 6/11/25:  CONCLUSIONS:   1. Left ventricular systolic function is hyperdynamic with an ejection fraction of 81 % by Felipe's method of disks. There are no regional wall motion abnormalities seen.   2. Mild left ventricular hypertrophy.   3. There is moderate (grade 2) left ventricular diastolic dysfunction.   4. Enlarged right ventricular cavity size and mild to moderately reduced right ventricular systolic function. Tricuspid annular plane systolic excursion (TAPSE) is 1.1 cm (normal >=1.7 cm). Tricuspid annular tissue Doppler S' is 10.0 cm/s (normal >10 cm/s).   5. Moderate mitral regurgitation.   6. Estimated pulmonary artery systolic pressure is 46 mmHg.   7. No significant valvular disease.   8. The inferior vena cava is normal in size (normal <2.1cm) with normal inspiratory collapse (normal >50%) consistent with normal right atrial pressure (~3, range 0-5mmHg).   9. Compared to the transthoracic echocardiogram performed on 7/3/2024, there have been no significant interval changes.

## 2025-06-13 NOTE — PROGRESS NOTE ADULT - ASSESSMENT
73 yo F with a PMH of Epilepsy on Keppra/lacosamide, COPD, asthma  (on CPAP at home, on chronic steroids), DM2, bronchiectasis, tracheomalacia s/p tracheloplasty, HTN, Hx of dissection of descending thoracic aorta, hx of aortic aneurysm, Type B dissection (7/2024), medically managed initially as she did not meet criteria for surgery at that time), evaluated by Dr. 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

## 2025-06-13 NOTE — PROGRESS NOTE ADULT - PROBLEM SELECTOR PLAN 5
Continue home mexiletine.  Holding Eliquis due to ?possible hemoptysis. Continue home mexiletine.  restarted eliquis - hemoptysis improved

## 2025-06-13 NOTE — CONSULT NOTE ADULT - TIME BILLING
Medical management as above, reviewing chart and coordinating care with primary team/staff, as well as reviewing vitals, radiology, medication list, recent labs, and prior records.    Does not include teaching time.
I spent 35 minutes face to face w/ this pt of which more than 50% of the time was spent counseling & coordinating care of this pt.
Total time spent including the following  [x] Physical chart review and documentation   An extensive review of the physical chart, electronic health record, and documentation was conducted to obtain collateral information including but not limited to:   - Current inpatient records (ED, H&P, primary team, and consultants [  Pulmonary, Cardio, ID, Neuro Sx ])   - Inpatient values/results (CBC, CMP, Imaging)   - Outpatient records   - Prior inpatient records (if available)   - Social work assessments   - Current or proposed treatment plans   - Pharmacotherapy review (including I-STOP if applicable)  [x]discussion with the primary team  [x]discussion with the patient, surrogate decision maker, or family  [x]Physical Exam and/or review of systems   [x]Formulation of assessment and plan       The _16_____ minutes spent in ACP (   See GOC note above              ) were independent to the time spent in time based billing

## 2025-06-13 NOTE — PROGRESS NOTE ADULT - ASSESSMENT
Patient is a 73 yo F w/ asthma (chronic methypred 8mg PO daily), bronchiectasis, allergic rhinitis, recurrent PNA, SDB ( on CPAP), tracheomalacia s/p tracheoplasty, tracheobronchomalacia, pAfib (Eliquis), colorectal ca s/p colostomy, aortic dissection s/p ascending aorta repair who p/w cough, SOB and hemoptysis.     Outpatient sputum cultures from 6/2 growing MRSA    Now with MRSA bacteremia as well    Home Airway clearance regimen: Albuterol q6h -> Chest Vest -> Perforomist BID  -> Pulmicort BID, - > Ohtuvayre 2.5 BID - > HyperSal 7% q12h. Patient also on Breo Ellipta 200 at 1 inhalation QD, Incruse Ellipta 1 puff QD, and on Tezspire airway     #SOB - Likely 2/2 COPD exacerbation/PNA  #MRSA PNA  #MRSA bactermia  #Hemoptysis  #Acute Asthma exacerbation  #SDB - on CPAP  - c/w Airway clearance regimen as possible Albuterol q6h -> Chest Vest -> Perforomist BID (patient brought in) -> Pulmicort BID, - > Ohtuvayre 2.5 BID - > HyperSal 7% q12h.  - c/w Eliquis for now, monitor for hemoptysis  - Patient also on Breo Ellipta 200 at 1 inhalation QD, Incruse Ellipta 1 puff QD. Can use Advair/Spiriva while admitted  - Tezspire outpatient   - Agree with Vanc. f/u ID recs, still bacteremic, may need SAFIA  - c/w home CPAP and Chest Vest for use  - Can decrease steroids back to home dose    Shaan Shah MD  Pulmonary & Critical Care

## 2025-06-13 NOTE — PROGRESS NOTE ADULT - PROBLEM SELECTOR PLAN 3
A1c 8.5  Takes Lantus 22u in AM and 28u in PM.  incr Lantus 32u QHS   moderate dose ISS   Fs better controlled  Monitor FS A1c 8.5  Takes Lantus 22u in AM and 28u in PM.  c/w Lantus 32u QHS   added premeal admelog 2/4/4 units   Fs better controlled  Monitor FS

## 2025-06-13 NOTE — CONSULT NOTE ADULT - SUBJECTIVE AND OBJECTIVE BOX
Initial Consult    Date of Service: 13-Jun-2025 11:41    HPI:  The patient is a 74-year-old female with a complex medical history including epilepsy, COPD, asthma (managed with CPAP at home and chronic steroids), type 2 diabetes, bronchiectasis, tracheomalacia post-tracheoplasty, hypertension, aortic aneurysm history, type B aortic dissection managed medically, type A dissection post bioAVR with Bental procedure, severe aortic stenosis post TAVR, TIA's, DVT, Afib on anticoagulation, colon cancer post-resection with a colostomy bag, and neuropathy. She presents with shortness of breath and a productive cough. Recently hospitalized for visual hallucinations and falls, she was started on Bactrim for MRSA in sputum. Her daughter, Zoe, provides collateral information as the patient is intermittently confused since the recent hospitalization. The pulmonary and infectious disease teams are following due to MRSA pneumonia and bacteremia. During her recent prior admission from 17-Apr-2025 to 24-Apr-2025, she was treated for visual hallucination, falls, and asthma/COPD exacerbation, with a multidisciplinary approach involving neurology, psychiatry, and other specialties.    CONSULTANTS INPUTS:  - Pulmonology: Managing asthma, COPD exacerbation, airway clearance regimen, and steroid dose adjustments.  - Cardiology: Monitoring for MRSA bacteremia impact on cardiac structures, monitoring PVCs, and overseeing medication adjustments.  - Neurosurgery: Evaluated spine concerns, no acute intervention required, outpatient follow-up arranged.  - Infectious Disease: Following MRSA bacteremia, managing antibiotic regimen, and further imaging suggested for potential abscess.    PERTINENT PM/SXH:   Epilepsy on Keppra/lacosamide, COPD, asthma, DM2, bronchiectasis, tracheomalacia s/p tracheoplasty, HTN, descending thoracic aorta dissection, severe AS s/p TAVR, TIA's, DVT, Afib, colon cancer resection, neuropathy, history of multiple significant surgeries.    FAMILY HISTORY:  - Family history of asthma.  - Family history of diabetes mellitus type II.  - Sibling with breast cancer.    Family Hx substance abuse [ ]yes [x]no  ITEMS NOT CHECKED ARE NOT PRESENT    SOCIAL HISTORY:   Significant other/partner[ ]  Children[x]  Baptism/Spirituality:  Substance hx:  [ ]   Tobacco hx:  [x]No   Alcohol hx: [ ]   Home Opioid hx:  [ ] I-Stop Reference No:  Living Situation: [x]Home  [ ]Long term care  [ ]Rehab [ ]Other  Also, social history as per Care Coordination notes: Lives with daughter, uses rollator; receives 12hrs/day PCA services and potential PT functional evaluation.    ADVANCE DIRECTIVES:    DNR/MOLST  [ ]  Living Will  [ ]   DECISION MAKER(s):  [ ] Health Care Proxy(s)  [x] Surrogate(s)  [ ] Guardian           Name(s): Zoe Flores Phone Number(s): [phone]    BASELINE (I)ADL(s) (prior to admission):  [place]: [ ]Total  [x] Moderate [ ]Dependent    Allergies  - Dilaudid: Short breath, allergy to IV form, can tolerate PO form  - Avelox: Short breath, Pruritus, tolerates levofloxacin  - Iodine, Tetanus toxoid, Codeine, Aspirin, Valium, Cefepime: Short breath, Anaphylaxis to penicillin, shellfish    Intolerances    MEDICATIONS  (STANDING):  - See detailed list of home medications.      MEDICATIONS  (PRN):  - Lactulose, magnesium hydroxide, melatonin, ondansetron, dextrose oral gel    ITEMS UNCHECKED ARE NOT PRESENT    PRESENT SYMPTOMS: [ ]Unable to self-report  [ ] CPOT [ ] PAINADs [ ] RDOS  Source if other than patient:  [x]Family     Pain: [x]yes [ ]no  QOL impact -   Location -                    Aggravating factors -  Quality - Shortness of breath  Radiation -  Timing-  Severity (0-10 scale):  Minimal acceptable level (0-10 scale):     CPOT:    PAINAD Score: See PAINAD tool and score below     Dyspnea:                           [x]Mild [ ]Moderate [ ]Severe    RDOS: See RDOS tool and score below   0 to 2  minimal or no respiratory distress   3  mild distress  4 to 6 moderate distress  >7 severe distress    Anxiety:                             [ ]Mild [ ]Moderate [ ]Severe  Fatigue:                             [ ]Mild [ ]Moderate [ ]Severe  Nausea:                             [ ]Mild [x]Moderate [ ]Severe  Loss of appetite:              [ ]Mild [ ]Moderate [ ]Severe  Constipation:                    [ ]Mild [ ]Moderate [ ]Severe    Spiritual Screening:   Chaplaincy Referral: [ ] yes [ ] refused [ ] following [ ] Deferred     Caregiver [place]? [x] yes [ ] no [ ] Deferred [ ] Declined                 Anticipatory Grief present? [ ] yes [ ] no  [ ] Deferred                              Other Symptoms:  [ ] All other review of systems negative     Palliative Performance Status Version 2:       PHYSICAL EXAM:  Vital Signs Last 24 Hrs  T(C): 36.9 (13 Jun 2025 05:40), Max: 36.9 (13 Jun 2025 05:40)  T(F): 98.4 (13 Jun 2025 05:40), Max: 98.4 (13 Jun 2025 05:40)  HR: 71 (13 Jun 2025 11:07) (51 - 71)  BP: 112/69 (13 Jun 2025 11:07) (112/69 - 126/81)  BP(mean): --  RR: 18 (13 Jun 2025 11:07) (18 - 18)  SpO2: 95% (13 Jun 2025 11:07) (95% - 98%)    Parameters below as of 13 Jun 2025 11:07  Patient On (Oxygen Delivery Method): room air     I&O's Summary    12 Jun 2025 07:01  -  13 Jun 2025 07:00  --------------------------------------------------------  IN: 220 mL / OUT: 1100 mL / NET: -880 mL    GENERAL: [ ]Cachexia    [x]Alert  [x]Oriented x 3  [ ]Lethargic  [ ]Unarousable  [ ]Verbal  [ ]Non-Verbal  Behavioral:   [x] Anxiety  [x] Delirium [ ] Agitation [ ] Other  HEENT:  [x]Normal   [ ]Dry mouth   [x]ET Tube/Trach  [ ]Oral lesions  PULMONARY:   [ ]Clear [x]Tachypnea  [ ]Audible excessive secretions   [ ]Rhonchi        [ ]Right [ ]Left [x]Bilateral  [ ]Crackles        [ ]Right [ ]Left [x]Bilateral  [ ]Wheezing     [ ]Right [ ]Left [ ]Bilateral  [ ]Diminished breath sounds [ ]right [ ]left [ ]bilateral  [ ]Labored Breathing   CARDIOVASCULAR:    [x]Regular [ ]Irregular [ ]Tachy  [ ]Calvin [ ]Murmur [ ]Other  GASTROINTESTINAL:  [x]Soft  [ ]Distended   [x]+BS  [x]Non tender [ ]Tender  [ ]Other [ ]PEG [ ]OGT/ NGT  Last BM:  GENITOURINARY:  [ ]Normal [ ] Incontinent   [ ]Oliguria/Anuria   [ ]Amezcua  MUSCULOSKELETAL:   [x]Normal   [ ]Weakness  [ ]Bed/Wheelchair bound [ ]Edema  NEUROLOGIC:   [x]No focal deficits  [ ]Cognitive impairment  [x]Dysphagia [ ]Dysarthria [ ]Paresis [ ]Other   SKIN:   [x]Normal  [ ]Rash  [ ]Other  [ ]Pressure ulcer(s)       Present on admission [ ]y [ ]n    CRITICAL CARE:  [ ] Shock Present  [x]Septic [ ]Cardiogenic [ ]Neurologic [ ]Hypovolemic  [ ]  Vasopressors [ ]  Inotropes   [ ]Respiratory failure present [x]Mechanical ventilation [ ]Non-invasive ventilatory support [ ]High flow    [ ]Acute  [ ]Chronic [x]Hypoxic  [ ]Hypercarbic [ ]Other  [ ]Other organ failure     LABS:                          12.5   19.12 )-----------( 182      ( 13 Jun 2025 11:05 )             41.0                 140  |  106  |  21  ----------------------------<  227[H]  5.0  |  21[L]  |  0.64    Ca    8.9      12 Jun 2025 11:20      RADIOLOGY & ADDITIONAL STUDIES:    09-Jun-2025 Xray Chest 1 View - PORTABLE-Urgent: IMPLICATION: Right patchy airspace opacity may reflect pneumonia.    12-Jun-2025 MRI lumbar spine: IMPRESSION: No evidence of discitis-osteomyelitis, epidural or paraspinal abscess.     TTE 06/11/25:  CONCLUSIONS:  1. Left ventricular systolic function is hyperdynamic with an ejection fraction of 81%.  2. Mild left ventricular hypertrophy.  3. Moderate (grade 2) left ventricular diastolic dysfunction.  4. Enlarged right ventricular cavity size and mild to moderately reduced right ventricular systolic function.  5. Moderate mitral regurgitation.  6. Estimated pulmonary artery systolic pressure is 46 mmHg.  7. No significant valvular disease.  8. No significant interval changes from previous testing.    [ ] Reviewed and interpreted by me      PROTEIN CALORIE MALNUTRITION PRESENT: [  ]mild [  ]moderate [x]severe [  ]underweight [  ]morbid obesity    Height (cm): 165.1 (12-05-22 @ 18:22), 165.1 (10-31-22 @ 11:22), 165.1 (10-23-22 @ 11:01)  Weight (kg): 81.6 (12-05-22 @ 18:22), 76 (10-31-22 @ 11:22), 82 (07-30-22 @ 16:00)  BMI (kg/m2): 29.9 (12-05-22 @ 18:22), 27.9 (10-31-22 @ 11:22), 30.1 (10-23-22 @ 11:01)    [ ]PPSV2 < or = to 30% [x]significant weight loss  [x]poor nutritional intake  [x]anasarca[ ]Artificial Nutrition      Other REFERRALS:  [ ]Hospice  [ ]Child Life  [x]Social Work [ ]Case management [ ]Holistic Therapy     Goals of Care Document: The patient's daughter, Zoe Flores, makes healthcare decisions. The patient wishes to be a full code. A conversation covered diagnosis, prognosis, and treatment options with the healthcare proxy. Initial Consult    Date of Service: 13-Jun-2025 11:41    HPI:  The patient is a 74-year-old female with a complex medical history including epilepsy, COPD, asthma (managed with CPAP at home and chronic steroids), type 2 diabetes, bronchiectasis, tracheomalacia post-tracheoplasty, hypertension, aortic aneurysm history, type B aortic dissection managed medically, type A dissection post bioAVR with Bental procedure, severe aortic stenosis post TAVR, TIA's, DVT, Afib on anticoagulation, colon cancer post-resection with a colostomy bag, and neuropathy. She presents with shortness of breath and a productive cough. Recently hospitalized for visual hallucinations and falls, she was started on Bactrim for MRSA in sputum. Her daughter, Zoe, provides collateral information as the patient is intermittently confused since the recent hospitalization. The pulmonary and infectious disease teams are following due to MRSA pneumonia and bacteremia. During her recent prior admission from 17-Apr-2025 to 24-Apr-2025, she was treated for visual hallucination, falls, and asthma/COPD exacerbation, with a multidisciplinary approach involving neurology, psychiatry, and other specialties. Geriatrics and Palliative Medicine ([facility]) Team was called for goals of care.     CONSULTANTS INPUTS:  - Pulmonology: Managing asthma, COPD exacerbation, airway clearance regimen, and steroid dose adjustments.  - Cardiology: Monitoring for MRSA bacteremia impact on cardiac structures, monitoring PVCs, and overseeing medication adjustments.  - Neurosurgery: Evaluated spine concerns, no acute intervention required, outpatient follow-up arranged.  - Infectious Disease: Following MRSA bacteremia, managing antibiotic regimen, and further imaging suggested for potential abscess.    PERTINENT PM/SXH:   Epilepsy on Keppra/lacosamide, COPD, asthma, DM2, bronchiectasis, tracheomalacia s/p tracheoplasty, HTN, descending thoracic aorta dissection, severe AS s/p TAVR, TIA's, DVT, Afib, colon cancer resection, neuropathy, history of multiple significant surgeries.    FAMILY HISTORY:  - Family history of asthma.  - Family history of diabetes mellitus type II.  - Sibling with breast cancer.    Family Hx substance abuse [ ]yes [x]no  ITEMS NOT CHECKED ARE NOT PRESENT    SOCIAL HISTORY:   Significant other/partner[x ]   Children[x]  Religious/Spirituality: Seventh Day Muslim   Substance hx:  [ ]   Tobacco hx:  [x]No   Alcohol hx: [ ]   Home Opioid hx:  [ ] I-Stop Reference No:  Living Situation: [x]Home  [ ]Long term care  [ ]Rehab [ ]Other  Also, social history as per Care Coordination notes: Lives with daughter, uses rollator; receives 12hrs/day PCA services and potential PT functional evaluation.    ADVANCE DIRECTIVES:    DNR/MOLST  [ ]  Living Will  [ ]   DECISION MAKER(s):  [ ] Health Care Proxy(s)  [x] Surrogate(s)  [ ] Guardian           Name(s): Zoe Flores Phone Number(s): [phone]    BASELINE (I)ADL(s) (prior to admission):  [place]: [ ]Total  [x] Moderate [ ]Dependent    Allergies  - Dilaudid: Short breath, allergy to IV form, can tolerate PO form  - Avelox: Short breath, Pruritus, tolerates levofloxacin  - Iodine, Tetanus toxoid, Codeine, Aspirin, Valium, Cefepime: Short breath, Anaphylaxis to penicillin, shellfish    Intolerances    MEDICATIONS  (STANDING):  albuterol    0.083% 2.5 milliGRAM(s) Nebulizer every 6 hours  apixaban 5 milliGRAM(s) Oral two times a day  artificial  tears Solution 1 Drop(s) Both EYES every 4 hours  budesonide    Inhalation Suspension 1 milliGRAM(s) Inhalation two times a day  chlorhexidine 2% Cloths 1 Application(s) Topical daily  dextrose 5%. 1000 milliLiter(s) (100 mL/Hr) IV Continuous <Continuous>  dextrose 5%. 1000 milliLiter(s) (50 mL/Hr) IV Continuous <Continuous>  dextrose 50% Injectable 25 Gram(s) IV Push once  dextrose 50% Injectable 12.5 Gram(s) IV Push once  dextrose 50% Injectable 25 Gram(s) IV Push once  famotidine    Tablet 20 milliGRAM(s) Oral daily  fluticasone propionate/ salmeterol 250-50 MICROgram(s) Diskus 1 Dose(s) Inhalation two times a day  formoterol for nebulization 20 MICROGram(s) Nebulizer every 12 hours  glucagon  Injectable 1 milliGRAM(s) IntraMuscular once  insulin glargine Injectable (LANTUS) 32 Unit(s) SubCutaneous at bedtime  insulin lispro (ADMELOG) corrective regimen sliding scale   SubCutaneous three times a day before meals  insulin lispro Injectable (ADMELOG) 2 Unit(s) SubCutaneous before breakfast  insulin lispro Injectable (ADMELOG) 4 Unit(s) SubCutaneous before lunch  insulin lispro Injectable (ADMELOG) 4 Unit(s) SubCutaneous before dinner  lacosamide 100 milliGRAM(s) Oral two times a day  levETIRAcetam 1000 milliGRAM(s) Oral two times a day  mexiletine 200 milliGRAM(s) Oral three times a day  mineral oil 30 milliLiter(s) Oral daily  montelukast 10 milliGRAM(s) Oral at bedtime  mupirocin 2% Nasal 1 Application(s) Both Nostrils two times a day  NIFEdipine XL 60 milliGRAM(s) Oral daily  Ohtuvayre 3 mg/2.5 mL Inhalation 3 milliGRAM(s) 3 milliGRAM(s) Nebulizer every 12 hours  pantoprazole    Tablet 40 milliGRAM(s) Oral before breakfast  polyethylene glycol 3350 17 Gram(s) Oral daily  rosuvastatin 5 milliGRAM(s) Oral at bedtime  senna 2 Tablet(s) Oral at bedtime  sertraline 50 milliGRAM(s) Oral daily  silver sulfADIAZINE 1% Cream 1 Application(s) Topical daily  sodium chloride 0.9%. 1000 milliLiter(s) (75 mL/Hr) IV Continuous <Continuous>  sodium chloride 7% Inhalation 4 milliLiter(s) Inhalation every 12 hours  tiotropium 2.5 MICROgram(s) Inhaler 2 Puff(s) Inhalation daily  vancomycin  IVPB 750 milliGRAM(s) IV Intermittent every 12 hours    MEDICATIONS  (PRN):  dextrose Oral Gel 15 Gram(s) Oral once PRN Blood Glucose LESS THAN 70 milliGRAM(s)/deciliter  lactulose Syrup 10 Gram(s) Oral daily PRN constipation  magnesium hydroxide Suspension 30 milliLiter(s) Oral daily PRN Constipation  melatonin 3 milliGRAM(s) Oral at bedtime PRN Insomnia  ondansetron Injectable 4 milliGRAM(s) IV Push every 8 hours PRN Nausea and/or Vomiting    ITEMS UNCHECKED ARE NOT PRESENT    PRESENT SYMPTOMS: [ ]Unable to self-report  [ ] CPOT [ ] PAINADs [ ] RDOS  Source if other than patient:  [ ]Family     Pain: [ ]yes [x ]no  QOL impact -   Location -                    Aggravating factors -  Quality - Shortness of breath  Radiation -  Timing-  Severity (0-10 scale):  Minimal acceptable level (0-10 scale):     CPOT:    PAINAD Score: See PAINAD tool and score below     Dyspnea:                           [x]Mild [ ]Moderate [ ]Severe    RDOS: See RDOS tool and score below   0 to 2  minimal or no respiratory distress   3  mild distress  4 to 6 moderate distress  >7 severe distress    Anxiety:                             [ ]Mild [ ]Moderate [ ]Severe  Fatigue:                             [ ]Mild [ ]Moderate [ ]Severe  Nausea:                             [ ]Mild [x]Moderate [ ]Severe  Loss of appetite:              [ ]Mild [ ]Moderate [ ]Severe  Constipation:                    [ ]Mild [ ]Moderate [ ]Severe    Spiritual Screening:   Chaplaincy Referral: [ ] yes [ ] refused [ ] following [x ] Deferred     Caregiver [place]? [ ] yes [x ] no [ ] Deferred [ ] Declined                 Anticipatory Grief present? [ ] yes [x ] no  [ ] Deferred                        Other Symptoms:  [x ] All other review of systems negative     Palliative Performance Status Version 2:       PHYSICAL EXAM:  Vital Signs Last 24 Hrs  T(C): 36.9 (13 Jun 2025 05:40), Max: 36.9 (13 Jun 2025 05:40)  T(F): 98.4 (13 Jun 2025 05:40), Max: 98.4 (13 Jun 2025 05:40)  HR: 71 (13 Jun 2025 11:07) (51 - 71)  BP: 112/69 (13 Jun 2025 11:07) (112/69 - 126/81)  BP(mean): --  RR: 18 (13 Jun 2025 11:07) (18 - 18)  SpO2: 95% (13 Jun 2025 11:07) (95% - 98%)    Parameters below as of 13 Jun 2025 11:07  Patient On (Oxygen Delivery Method): room air     I&O's Summary    12 Jun 2025 07:01  -  13 Jun 2025 07:00  --------------------------------------------------------  IN: 220 mL / OUT: 1100 mL / NET: -880 mL    GENERAL: [ ]Cachexia    [x]Alert  [x]Oriented x 3  [ ]Lethargic  [ ]Unarousable  [ x]Verbal  [ ]Non-Verbal  Behavioral:   [ ] Anxiety  [ ] Delirium [ ] Agitation [ ] Other  HEENT:  [x]Normal   [ ]Dry mouth   [ ]ET Tube/Trach  [ ]Oral lesions  PULMONARY:   [ ]Clear [ ]Tachypnea  [ ]Audible excessive secretions   [ ]Rhonchi        [ ]Right [ ]Left [x]Bilateral  [ ]Crackles        [ ]Right [ ]Left [x]Bilateral  [ ]Wheezing     [ ]Right [ ]Left [ ]Bilateral  [x ]Diminished breath sounds [ ]right [ ]left [x ]bilateral  [ ]Labored Breathing   CARDIOVASCULAR:    [ ]Regular [x ]Irregular [ ]Tachy  [ ]Calvin [ ]Murmur [ ]Other  GASTROINTESTINAL:  [x]Soft  [ ]Distended   [x]+BS  [x]Non tender [ ]Tender  [ ]Other [ ]PEG [ ]OGT/ NGT  Last BM:   [x] Colostomy   GENITOURINARY:  [x ]Normal [ ] Incontinent   [ ]Oliguria/Anuria   [ ]Amezcua  MUSCULOSKELETAL:   [x]Normal   [ ]Weakness  [ ]Bed/Wheelchair bound [ ]Edema  NEUROLOGIC:   [x]No focal deficits  [ ]Cognitive impairment  [ ]Dysphagia [ ]Dysarthria [ ]Paresis [ ]Other   SKIN:   [ ]Normal  [ ]Rash  [ ]Other  [ ]Pressure ulcer(s)       Present on admission [ ]y [ ]n  Sacrum, left heel, left elbow, right elbow DTI   CRITICAL CARE:  [ ] Shock Present  [ ]Septic [ ]Cardiogenic [ ]Neurologic [ ]Hypovolemic  [ ]  Vasopressors [ ]  Inotropes   [x ]Respiratory failure present [ ] Mechanical ventilation [ ]Non-invasive ventilatory support [ ]High flow    [ ]Acute  [x ]Chronic [x]Hypoxic  [ ]Hypercarbic [ ]Other  [ ]Other organ failure     LABS:                          12.5   19.12 )-----------( 182      ( 13 Jun 2025 11:05 )             41.0                 140  |  106  |  21  ----------------------------<  227[H]  5.0  |  21[L]  |  0.64    Ca    8.9      12 Jun 2025 11:20      RADIOLOGY & ADDITIONAL STUDIES:    09-Jun-2025 Xray Chest 1 View - PORTABLE-Urgent: IMPLICATION: Right patchy airspace opacity may reflect pneumonia.    12-Jun-2025 MRI lumbar spine: IMPRESSION: No evidence of discitis-osteomyelitis, epidural or paraspinal abscess.     TTE 06/11/25:  CONCLUSIONS:  1. Left ventricular systolic function is hyperdynamic with an ejection fraction of 81%.  2. Mild left ventricular hypertrophy.  3. Moderate (grade 2) left ventricular diastolic dysfunction.  4. Enlarged right ventricular cavity size and mild to moderately reduced right ventricular systolic function.  5. Moderate mitral regurgitation.  6. Estimated pulmonary artery systolic pressure is 46 mmHg.  7. No significant valvular disease.  8. No significant interval changes from previous testing.    [ ] Reviewed and interpreted by me      PROTEIN CALORIE MALNUTRITION PRESENT: [  ]mild [  ]moderate [x]severe [  ]underweight [  ]morbid obesity    Height (cm): 165.1 (12-05-22 @ 18:22), 165.1 (10-31-22 @ 11:22), 165.1 (10-23-22 @ 11:01)  Weight (kg): 81.6 (12-05-22 @ 18:22), 76 (10-31-22 @ 11:22), 82 (07-30-22 @ 16:00)  BMI (kg/m2): 29.9 (12-05-22 @ 18:22), 27.9 (10-31-22 @ 11:22), 30.1 (10-23-22 @ 11:01)    [ ]PPSV2 < or = to 30% [x]significant weight loss  [x]poor nutritional intake  [x]anasarca[ ]Artificial Nutrition      Other REFERRALS:  [ ]Hospice  [ ]Child Life  [x]Social Work [ ]Case management [ ]Holistic Therapy     Goals of Care Document: The patient's daughter, Zoe Flores, makes healthcare decisions. The patient wishes to be a full code. A conversation covered diagnosis, prognosis, and treatment options with the healthcare proxy.

## 2025-06-13 NOTE — CONSULT NOTE ADULT - PROBLEM SELECTOR RECOMMENDATION 2
Work up and Rx of possible causes as per the primary team.  Will recommend:   -Frequent reassurance and verbal orientation   -Family members or other familiar persons by his bedside.   -Delusions and hallucinations should be neither endorsed nor challenged.   -Physical restraints should be avoided. Alternatives to restraint use, such as constant observation (preferably by someone familiar to the patient such as a family member), may be more effective.  -PT eval and early ambulation if possible  -Move to a room with a window   -If possible, transfer out from [address] to [address] [place] [address] ([place] floor) when possible.

## 2025-06-13 NOTE — PROGRESS NOTE ADULT - PROBLEM SELECTOR PLAN 1
--Met SIRS with leukocytosis and tachpnyea (RR 22) on initial presentation. Present on admission.   --Multifocal Pneumonia on Imaging.   --Bacteremia- MRSA 3/4 bottles, repeat 6/11 NTD, repeat bx q48 hours - recc to send another set in am    Sputum stain positive for Gram-positive cocci  MRSA PCR positive  TTE without endocarditis - ID follow up if need for SAFIA - monitoring repeat blood cultures   Maintained on broad-spectrum antibiotics.  ID and pulmonology following  Patient with many antibiotic allergies - continue ertapenem and Vanco for now --Met SIRS with leukocytosis and tachpnyea (RR 22) on initial presentation. Present on admission.   --Multifocal Pneumonia on Imaging.   --Bacteremia- MRSA 3/4 bottles, repeat 6/11 1/4+GPC in clusters, repeat bx q48 hours     TTE without endocarditis - SAFIA ordered   monitor repeat blood cultures   c/w vancomycin - monitor trough  ID and pulmonology following  Patient with many antibiotic allergies

## 2025-06-13 NOTE — PROGRESS NOTE ADULT - SUBJECTIVE AND OBJECTIVE BOX
Patient is a 74y old  Female who presents with a chief complaint of SOB (13 Jun 2025 11:38)    Being followed by ID for MRSA bacteremia     Interval history:  Cough improving  No other complaints   no new headaches, vision at baseline   Back pain the same   Bcx from 06/11- positive   Planned for SAFIA      ROS:  No N/V  No abd pain  No urinary complaints  No HA  No joint or limb pain    Antimicrobials:  vancomycin  IVPB 750 milliGRAM(s) IV Intermittent every 12 hours      Vital Signs Last 24 Hrs  T(C): 36.7 (06-13-25 @ 11:07), Max: 36.9 (06-13-25 @ 05:40)  T(F): 98.1 (06-13-25 @ 11:07), Max: 98.4 (06-13-25 @ 05:40)  HR: 71 (06-13-25 @ 11:07) (51 - 71)  BP: 112/69 (06-13-25 @ 11:07) (112/69 - 126/81)  BP(mean): --  RR: 18 (06-13-25 @ 11:07) (18 - 18)  SpO2: 95% (06-13-25 @ 11:07) (95% - 98%)    PHYSICAL EXAM:  GENERAL: Awake, alert, coughing  \HEAD: NC/AT, neck supple, moist mucous membranes  \EYES: PERRL, EOM grossly intact, sclera anicteric  \LUNGS: coarse/rhonchorous breath sounds bilaterally,   CARDIAC: RRR, no m/r/g  ABDOMEN: Soft, non tender, ostomy site well appearing with brown stool output  EXT: Left leg open wound- purulent drainage., B/L Knee TKA  NEURO: A&Ox3. Moving all extremities without focal deficit.   SKIN: Warm and dry. No rash.    Lab Data:                        12.5   19.12 )-----------( 182      ( 13 Jun 2025 11:05 )             41.0     06-13    136  |  102  |  18  ----------------------------<  279[H]  4.4   |  21[L]  |  0.59    Ca    8.7      13 Jun 2025 11:05    TPro  5.8[L]  /  Alb  3.2[L]  /  TBili  0.2  /  DBili  x   /  AST  19  /  ALT  27  /  AlkPhos  67  06-13      Blood Blood  06-11-25   No growth at 24 hours  --  --      Blood Blood-Peripheral  06-11-25   Growth in anaerobic bottle: Gram Positive Cocci in Clusters  --    Growth in anaerobic bottle: Gram Positive Cocci in Clusters      Sputum Sputum  06-10-25   Numerous Methicillin Resistant Staphylococcus aureus  Normal Respiratory Val absent  --  Methicillin resistant Staphylococcus aureus      Clean Catch Clean Catch (Midstream)  06-09-25   <10,000 CFU/mL Normal Urogenital Val  --  --      Sputum Sputum  06-09-25   Moderate Methicillin Resistant Staphylococcus aureus  Commensal val consistent with body site  --  Methicillin resistant Staphylococcus aureus      Blood Blood-Peripheral  06-09-25   Growth in anaerobic bottle: Methicillin Resistant Staphylococcus aureus  --  Methicillin resistant Staphylococcus aureus      Blood Blood-Peripheral  06-09-25   Growth in aerobic and anaerobic bottles: Methicillin Resistant  Staphylococcus aureus  See previous culture  [id number] for susceptibility  Direct identification is available within approximately 3-5  hours either by Blood Panel Multiplexed PCR or Direct  MALDI-TOF. Details: [email or web address]  --  Blood Culture PCR                  RADIOLOGY:  below reviewed    RADIOLOGY:  below reviewed    ACC: [id number] EXAM:  CT CHEST   ORDERED BY: GERRY SALAS     PROCEDURE DATE:  06/09/2025          INTERPRETATION:  CLINICAL INFORMATION: Shortness of breath. Evaluate for   pneumonia. History of bronchiectasis and tracheomalacia status post   tracheoplasty.    COMPARISON: CT chest 4/17/2025, CTA chest abdomen and pelvis 2/20/2025,   and additional chest CTs dating back to 9/22/2016.    CONTRAST/COMPLICATIONS:  IV Contrast: None  Oral Contrast: None.    PROCEDURE:  CT of the Chest was performed.  Sagittal and coronal reformats were performed.    FINDINGS:    LUNGS AND LARGE AIRWAYS: Mild central airways secretions. Multifocal   patchy and coalescent consolidative and ground-glass opacities in the   posterior right upper lobe, right middle lobe, and bilateral lower lobes.  PLEURA: Trace left pleural fluid.  HEART/VESSELS: Heart size is normal. No pericardial effusion. Status post   ascending aortic and aortic valve replacement with internal   valve-in-valve TAVR. Residual dissection involving the thoracolumbar   abdominal aorta, better evaluated on chest CTA dated 2/20/2025.  MEDIASTINUM AND MARANDA: No lymphadenopathy.  CHEST WALL AND LOWER NECK: Within normal limits.  VISUALIZED UPPER ABDOMEN: 1 cm left renal cyst. Stable partially imaged   complex left renal cyst with mild peripheral calcifications. A few stable   hypoattenuating pancreatic cystic lesions.  BONES: Sternotomy. Degenerative changes.    IMPRESSION:  Multifocal pneumonia. Recommend follow-up chest CT in 3 months to ensure   clearance.        MRI lumbar spine 06/11  IMPRESSION:  1. No evidence of discitis-osteomyelitis, epidural or paraspinal abscess.  2. Stable grade 1 anterolisthesis L3 over L4 with severe bilateral L3-L4   facet arthrosis.  3. L3-L4 anterolisthesis with a stable left foraminal-lateral disc   protrusion superimposed upon a disc bulge, resulting in moderate central   canal and mild left neural foramen stenosis with facet arthrosis and   ligamentum flavum hypertrophy, crowding the nerve roots of the cauda   equina. Left foraminal-lateral annular tear.  4. L4-L5 stable right foraminal-lateral disc protrusion superimposed upon   a disc bulge, resulting in mild central canal, bilateral lateral recess,   moderate right and mild left neural foramen stenosis with facet arthrosis   and ligamentum flavum hypertrophy, contacting the right L4 and L5 nerve   roots.  5. L5-S1 stable disc bulge-spondylitic ridge complex, resulting in mild   central canal and severe bilateral neural foramen stenosis with facet   arthrosis, impinging upon bilateral L5 nerve roots.  6. Additional findings, including those degenerative, described in detail   above.    --- End of Report ---    TTE 06/11   CONCLUSIONS:      1. Left ventricular systolic function is hyperdynamic with an ejection fraction of 81 % by Felipe's method of disks. There are no regional wall motion abnormalities seen.   2. Mild left ventricular hypertrophy.   3. There is moderate (grade 2) left ventricular diastolic dysfunction.   4. Enlarged right ventricular cavity size and mild to moderately reduced right ventricular systolic function. Tricuspid annular plane systolic excursion (TAPSE) is 1.1 cm (normal >=1.7 cm). Tricuspid annular tissue Doppler S' is 10.0 cm/s (normal >10 cm/s).   5. Moderate mitral regurgitation.   6. Estimated pulmonary artery systolic pressure is 46 mmHg.   7. No significant valvular disease.   8. The inferior vena cava is normal in size (normal <2.1cm) with normal inspiratory collapse (normal >50%) consistent with normal right atrial pressure (~3, range 0-5mmHg).   9. Compared to the transthoracic echocardiogram performed on 7/3/2024, there have been no significant interval changes.

## 2025-06-13 NOTE — PROGRESS NOTE ADULT - PROBLEM SELECTOR PLAN 2
with exacerbation  Hemoptysis- mild  Home Regimen: Albuterol Q6H -> Chest Vest -> Perforomist BID  -> Pulmicort BID, - > Ohtuvayre 2.5 BID - > HyperSal 7% Q12H. Patient also on Breo Ellipta 200 at 1 inhalation QD, Incruse Ellipta 1 puff QD, and on Tezspire airway   Pulm recs appreciated  Continue home montelukast, albuterol Q6H, Perforomist and Ohtuvayre (patient own medication), Pulmicort. Advair/Spiriva (in place of Breo/Incruse, not on formulary). Tezspire as OP  Hold hypersaline due to possible hemoptysis. Hold home glycopyrrolate in order to collect secretions for sputum culture.  Takes methylprednisolone 8mg daily at home - > increase to prednisone 40mg daily for now per pulmonology  Continue chest PT and home CPAP QHS with exacerbation  Hemoptysis- mild  Home Regimen: Albuterol Q6H -> Chest Vest -> Perforomist BID  -> Pulmicort BID, - > Ohtuvayre 2.5 BID - > HyperSal 7% Q12H. Patient also on Breo Ellipta 200 at 1 inhalation QD, Incruse Ellipta 1 puff QD, and on Tezspire airway   Pulm recs appreciated  Continue home montelukast, albuterol Q6H, Perforomist and Ohtuvayre (patient own medication), Pulmicort. Advair/Spiriva (in place of Breo/Incruse, not on formulary). Tezspire as OP  Hold hypersaline due to possible hemoptysis. Hold home glycopyrrolate in order to collect secretions for sputum culture.  Takes methylprednisolone 8mg daily at home - > increase to prednisone 40mg daily for now per pulmonology  chest PT w/ home vest and home CPAP QHS with exacerbation  Hemoptysis- mild  Home Regimen: Albuterol Q6H -> Chest Vest -> Perforomist BID  -> Pulmicort BID, - > Ohtuvayre 2.5 BID - > HyperSal 7% Q12H. Patient also on Breo Ellipta 200 at 1 inhalation QD, Incruse Ellipta 1 puff QD, and on Tezspire airway   Pulm recs appreciated  Continue home montelukast, albuterol Q6H, Perforomist and Ohtuvayre (patient own medication), Pulmicort. Advair/Spiriva (in place of Breo/Incruse, not on formulary). Tezspire as OP  restarted hypersaline Hold home glycopyrrolate in order to collect secretions for sputum culture.  Takes methylprednisolone 8mg daily at home - > increase to prednisone 40mg daily for now per pulmonology  chest PT w/ home vest and home CPAP QHS

## 2025-06-13 NOTE — PROGRESS NOTE ADULT - SUBJECTIVE AND OBJECTIVE BOX
CHIEF COMPLAINT: SOB    Interval Events: No acute events overnight. No further hemoptysis, now brown sputum    REVIEW OF SYSTEMS:  Constitutional: [ ] negative [ ] fevers [ ] chills [ ] weight loss [ ] weight gain  HEENT: [ ] negative [ ] dry eyes [ ] eye irritation [ ] postnasal drip [ ] nasal congestion  CV: [ ] negative  [X] chest pain [ ] orthopnea [ ] palpitations [ ] murmur  Resp: [ ] negative [X] cough [ ] shortness of breath [X] dyspnea [ ] wheezing [X] sputum [ ] hemoptysis  GI: [ ] negative [ ] nausea [ ] vomiting [ ] diarrhea [ ] constipation [ ] abd pain [ ] dysphagia   : [ ] negative [ ] dysuria [ ] nocturia [ ] hematuria [ ] increased urinary frequency  Musculoskeletal: [ ] negative [ ] back pain [ ] myalgias [ ] arthralgias [ ] fracture  Skin: [ ] negative [ ] rash [ ] itch  Neurological: [ ] negative [ ] headache [ ] dizziness [ ] syncope [ ] weakness [ ] numbness  Psychiatric: [ ] negative [ ] anxiety [ ] depression  Endocrine: [ ] negative [ ] diabetes [ ] thyroid problem  Hematologic/Lymphatic: [ ] negative [ ] anemia [ ] bleeding problem  Allergic/Immunologic: [ ] negative [ ] itchy eyes [ ] nasal discharge [ ] hives [ ] angioedema  [X] All other systems negative  [ ] Unable to assess ROS because ________    OBJECTIVE:  ICU Vital Signs Last 24 Hrs  T(C): 36.7 (13 Jun 2025 11:07), Max: 36.9 (13 Jun 2025 05:40)  T(F): 98.1 (13 Jun 2025 11:07), Max: 98.4 (13 Jun 2025 05:40)  HR: 71 (13 Jun 2025 11:07) (51 - 71)  BP: 112/69 (13 Jun 2025 11:07) (112/69 - 126/81)  BP(mean): --  ABP: --  ABP(mean): --  RR: 18 (13 Jun 2025 11:07) (18 - 18)  SpO2: 95% (13 Jun 2025 11:07) (95% - 98%)    O2 Parameters below as of 13 Jun 2025 11:07  Patient On (Oxygen Delivery Method): room air              06-12 @ 07:01  -  06-13 @ 07:00  --------------------------------------------------------  IN: 220 mL / OUT: 1100 mL / NET: -880 mL      CAPILLARY BLOOD GLUCOSE      POCT Blood Glucose.: 265 mg/dL (13 Jun 2025 11:12)      PHYSICAL EXAM:  General: NAD  HEENT: EOMI, PERRLA, Exophthalmos  Neck: Supple  Respiratory: Scattered rhochi  Cardiovascular: S1S2, irregular  Abdomen: Soft, NTND, BS+  Extremities: No edema  Skin: Warm and dry, Bandage on L shin  Neurological: No gross focal deficits    HOSPITAL MEDICATIONS:  MEDICATIONS  (STANDING):  albuterol    0.083% 2.5 milliGRAM(s) Nebulizer every 6 hours  apixaban 5 milliGRAM(s) Oral two times a day  artificial  tears Solution 1 Drop(s) Both EYES every 4 hours  budesonide    Inhalation Suspension 1 milliGRAM(s) Inhalation two times a day  chlorhexidine 2% Cloths 1 Application(s) Topical daily  dextrose 5%. 1000 milliLiter(s) (50 mL/Hr) IV Continuous <Continuous>  dextrose 5%. 1000 milliLiter(s) (100 mL/Hr) IV Continuous <Continuous>  dextrose 50% Injectable 12.5 Gram(s) IV Push once  dextrose 50% Injectable 25 Gram(s) IV Push once  dextrose 50% Injectable 25 Gram(s) IV Push once  famotidine    Tablet 20 milliGRAM(s) Oral daily  fluticasone propionate/ salmeterol 250-50 MICROgram(s) Diskus 1 Dose(s) Inhalation two times a day  formoterol for nebulization 20 MICROGram(s) Nebulizer every 12 hours  glucagon  Injectable 1 milliGRAM(s) IntraMuscular once  insulin glargine Injectable (LANTUS) 32 Unit(s) SubCutaneous at bedtime  insulin lispro (ADMELOG) corrective regimen sliding scale   SubCutaneous three times a day before meals  insulin lispro Injectable (ADMELOG) 4 Unit(s) SubCutaneous before lunch  insulin lispro Injectable (ADMELOG) 4 Unit(s) SubCutaneous before dinner  lacosamide 100 milliGRAM(s) Oral two times a day  levETIRAcetam 1000 milliGRAM(s) Oral two times a day  mexiletine 200 milliGRAM(s) Oral three times a day  mineral oil 30 milliLiter(s) Oral daily  montelukast 10 milliGRAM(s) Oral at bedtime  mupirocin 2% Nasal 1 Application(s) Both Nostrils two times a day  NIFEdipine XL 60 milliGRAM(s) Oral daily  Ohtuvayre 3 mg/2.5 mL Inhalation 3 milliGRAM(s) 3 milliGRAM(s) Nebulizer every 12 hours  pantoprazole    Tablet 40 milliGRAM(s) Oral before breakfast  polyethylene glycol 3350 17 Gram(s) Oral daily  predniSONE   Tablet 40 milliGRAM(s) Oral daily  rosuvastatin 5 milliGRAM(s) Oral at bedtime  senna 2 Tablet(s) Oral at bedtime  sertraline 50 milliGRAM(s) Oral daily  silver sulfADIAZINE 1% Cream 1 Application(s) Topical daily  sodium chloride 0.9%. 1000 milliLiter(s) (75 mL/Hr) IV Continuous <Continuous>  sodium chloride 7% Inhalation 4 milliLiter(s) Inhalation every 12 hours  tiotropium 2.5 MICROgram(s) Inhaler 2 Puff(s) Inhalation daily  vancomycin  IVPB 750 milliGRAM(s) IV Intermittent every 12 hours    MEDICATIONS  (PRN):  dextrose Oral Gel 15 Gram(s) Oral once PRN Blood Glucose LESS THAN 70 milliGRAM(s)/deciliter  lactulose Syrup 10 Gram(s) Oral daily PRN constipation  magnesium hydroxide Suspension 30 milliLiter(s) Oral daily PRN Constipation  melatonin 3 milliGRAM(s) Oral at bedtime PRN Insomnia  ondansetron Injectable 4 milliGRAM(s) IV Push every 8 hours PRN Nausea and/or Vomiting      LABS:                        12.5   19.12 )-----------( 182      ( 13 Jun 2025 11:05 )             41.0     Hgb Trend: 12.5<--, 11.9<--, 12.3<--, 11.5<--, 12.6<--  06-12    140  |  106  |  21  ----------------------------<  227[H]  5.0   |  21[L]  |  0.64    Ca    8.9      12 Jun 2025 11:20      Creatinine Trend: 0.64<--, 0.59<--, 0.70<--, 0.90<--    Urinalysis Basic - ( 12 Jun 2025 11:20 )    Color: x / Appearance: x / SG: x / pH: x  Gluc: 227 mg/dL / Ketone: x  / Bili: x / Urobili: x   Blood: x / Protein: x / Nitrite: x   Leuk Esterase: x / RBC: x / WBC x   Sq Epi: x / Non Sq Epi: x / Bacteria: x            MICROBIOLOGY:     Culture - Blood (collected 11 Jun 2025 09:10)  Source: Blood Blood  Preliminary Report (12 Jun 2025 14:02):    No growth at 24 hours    Culture - Blood (collected 11 Jun 2025 05:45)  Source: Blood Blood-Peripheral  Gram Stain (12 Jun 2025 16:52):    Growth in anaerobic bottle: Gram Positive Cocci in Clusters  Preliminary Report (12 Jun 2025 16:52):    Growth in anaerobic bottle: Gram Positive Cocci in Clusters        RADIOLOGY:  [ ] Reviewed and interpreted by me    PULMONARY FUNCTION TESTS:    EKG:

## 2025-06-13 NOTE — PROGRESS NOTE ADULT - PROBLEM SELECTOR PLAN 6
Continue home labetalol and nifedipine with hold parameters Continue nifedipine with hold parameters  monitor bp   labetalol held due to bradycardia

## 2025-06-13 NOTE — CONSULT NOTE ADULT - PROBLEM SELECTOR RECOMMENDATION 9
Work up and Rx as per the primary team. However, PNA appears to be a main issue.   CPAP and chest vest as requested by the patient's family and as deemed to be appropriate by the primary team.

## 2025-06-13 NOTE — PROGRESS NOTE ADULT - ASSESSMENT
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 Patient reporting worsening cough and SOB over last 2-3 days. Patient recently dx with MRSA on sputum culture, started treatment on Bactrim. Reports she recently started Bactrim ds 1 BID x 10 days (6/5/2025)    Afebrile here in the ED. WBC on admission elevated to 25K. CXR on admission showing Right patchy airspace opacity may reflect pneumonia. CT chest non contrast - Multifocal pneumonia. Found to have MRSA bacteremia. Has a TAVR in place (severe AS s/p TAVR (9/10/2023)),  Has Bilateral TKA ( 20 years ago), plate and screws across pubic symphysis.     06/11 MRI lumbar spine-  No evidence of discitis-osteomyelitis, epidural or paraspinal abscess.  TTE on 06/11- TAVR, There is trace paravalvular regurgitation. Moderate MR. Compared to the transthoracic echocardiogram performed on 7/3/2024, there have been no significant interval changes.  CTAP 06/12- Patchy cortical hypoenhancement in the bilateral renal lower poles, possibly pyelonephritis. UA on admission was negative, No abscess.  Pending SAFIA,      # High grade MRSA bacteremia ( left leg open wound likely source vs PNA)  # Multifocal PNA  # Recent MRSA PNA  # Left leg open wound   # S/P TAVR in 2023   # S/P internal fixation of remote fractures of the superior pubic rami and pubic symphysis  # Bilateral TKA - 20 years ago   # Leucocytosis   # Immunocompromised host   # severe persistent asthma-steroid dependent  # bronchiectasis/COPD-   # H/o multiple infections   # Chronic cough   # H/O multiple antibiotic allergies   # Elevated inflammatory markers     MICRO:   06/09 Bcx- MRSA ( 3/4) ( S- GARRETT: 1- Vancomycin)   06/09 Sputum cx-  MRSA  06/09 Urine cx- Negative   06/09 Urine strep and urine legionella- Negative   06/11 Bcx- 1/4 GPC in clusters     06/02/25- Sputum cx- MRSA S- doxy, S- bactrim, S- linezolid, S- vancomycin     ABX;   Vancomycin 06/09-- current     Recommendations:   - TTE on 06/11- TAVR, There is trace paravalvular regurgitation. Moderate MR.   - Would check SAFIA to rule out endocarditis   - get Bcx X 2 on 06/13  - Continue Vancomycin 750 mg Q12H. Monitor V. levels, AUC goal 400-600    - Trend ESR/CRP  - If repeat bcx remain positive would change to dual therapy.       In addition to reviewing history, imaging, documents, labs, microbiology, took into account antibiotic stewardship, local antibiogram and infection control strategies and potential transmission issues.    Discussed with the primary team. I will be off service starting tomorrow. My colleagues will continue to follow the patient.     Mally Ramirez MD  Attending Physician, Division of Infectious Diseases  Department of Medicine   [facility]    Contact on TEAMS messaging from 9am - 5pm  Office: [phone] (after 5 PM or weekend) 74Y F complex PMH Epilepsy on Keppra, COPD, asthma  (on CPAP and VATS at home, on chronic steroids), DM2, bronchiectasis, tracheomalacia s/p tracheloplasty, HTN, Hx of dissection of descending thoracic aorta, hx of aortic aneurysm, Type B dissection (7/2024), medically managed initially as she did not meet criteria for surgery at that time), evaluated by Dr. 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 Patient reporting worsening cough and SOB over last 2-3 days. Patient recently dx with MRSA on sputum culture, started treatment on Bactrim. Reports she recently started Bactrim ds 1 BID x 10 days (6/5/2025)    Afebrile here in the ED. WBC on admission elevated to 25K. CXR on admission showing Right patchy airspace opacity may reflect pneumonia. CT chest non contrast - Multifocal pneumonia. Found to have MRSA bacteremia. Has a TAVR in place (severe AS s/p TAVR (9/10/2023)),  Has Bilateral TKA ( 20 years ago), plate and screws across pubic symphysis.     06/11 MRI lumbar spine-  No evidence of discitis-osteomyelitis, epidural or paraspinal abscess.  TTE on 06/11- TAVR, There is trace paravalvular regurgitation. Moderate MR. Compared to the transthoracic echocardiogram performed on 7/3/2024, there have been no significant interval changes.  CTAP 06/12- Patchy cortical hypoenhancement in the bilateral renal lower poles, possibly pyelonephritis. UA on admission was negative, No abscess.  Pending SAFIA,      # High grade MRSA bacteremia ( left leg open wound likely source vs PNA)  # Multifocal PNA  # Recent MRSA PNA  # Left leg open wound   # S/P TAVR in 2023   # S/P internal fixation of remote fractures of the superior pubic rami and pubic symphysis  # Bilateral TKA - 20 years ago   # Leucocytosis   # Immunocompromised host   # severe persistent asthma-steroid dependent  # bronchiectasis/COPD-   # H/o multiple infections   # Chronic cough   # H/O multiple antibiotic allergies   # Elevated inflammatory markers     MICRO:   06/09 Bcx- MRSA ( 3/4) ( S- GARRETT: 1- Vancomycin)   06/09 Sputum cx-  MRSA  06/09 Urine cx- Negative   06/09 Urine strep and urine legionella- Negative   06/11 Bcx- 1/4 GPC in clusters     06/02/25- Sputum cx- MRSA S- doxy, S- bactrim, S- linezolid, S- vancomycin     ABX;   Vancomycin 06/09-- current     Recommendations:   - TTE on 06/11- TAVR, There is trace paravalvular regurgitation. Moderate MR.   - Would check SAFIA to rule out endocarditis   - get Bcx X 2 on 06/13  - Continue Vancomycin 750 mg Q12H. Monitor V. levels, AUC goal 400-600  ( current AUC- 450) can continue the same dose.   - Trend ESR/CRP  - If repeat bcx remain positive would change to dual therapy.       In addition to reviewing history, imaging, documents, labs, microbiology, took into account antibiotic stewardship, local antibiogram and infection control strategies and potential transmission issues.    Discussed with the primary team. I will be off service starting tomorrow. My colleagues will continue to follow the patient.     Mally Ramirez MD  Attending Physician, Division of Infectious Diseases  Department of Medicine   [facility]    Contact on TEAMS messaging from 9am - 5pm  Office: [phone] (after 5 PM or weekend)

## 2025-06-13 NOTE — PROGRESS NOTE ADULT - SUBJECTIVE AND OBJECTIVE BOX
[facility] Division of Hospital Medicine  Matilde Bellamy DO  [employer]    Patient is a 74y old  Female who presents with a chief complaint of SOB (13 Jun 2025 12:55)        SUBJECTIVE / OVERNIGHT EVENTS: no acute complaints       MEDICATIONS  (STANDING):  albuterol    0.083% 2.5 milliGRAM(s) Nebulizer every 6 hours  apixaban 5 milliGRAM(s) Oral two times a day  artificial  tears Solution 1 Drop(s) Both EYES every 4 hours  budesonide    Inhalation Suspension 1 milliGRAM(s) Inhalation two times a day  chlorhexidine 2% Cloths 1 Application(s) Topical daily  dextrose 5%. 1000 milliLiter(s) (50 mL/Hr) IV Continuous <Continuous>  dextrose 5%. 1000 milliLiter(s) (100 mL/Hr) IV Continuous <Continuous>  dextrose 50% Injectable 12.5 Gram(s) IV Push once  dextrose 50% Injectable 25 Gram(s) IV Push once  dextrose 50% Injectable 25 Gram(s) IV Push once  famotidine    Tablet 20 milliGRAM(s) Oral daily  fluticasone propionate/ salmeterol 250-50 MICROgram(s) Diskus 1 Dose(s) Inhalation two times a day  formoterol for nebulization 20 MICROGram(s) Nebulizer every 12 hours  glucagon  Injectable 1 milliGRAM(s) IntraMuscular once  insulin glargine Injectable (LANTUS) 32 Unit(s) SubCutaneous at bedtime  insulin lispro (ADMELOG) corrective regimen sliding scale   SubCutaneous three times a day before meals  insulin lispro Injectable (ADMELOG) 4 Unit(s) SubCutaneous before lunch  insulin lispro Injectable (ADMELOG) 4 Unit(s) SubCutaneous before dinner  lacosamide 100 milliGRAM(s) Oral two times a day  levETIRAcetam 1000 milliGRAM(s) Oral two times a day  mexiletine 200 milliGRAM(s) Oral three times a day  mineral oil 30 milliLiter(s) Oral daily  montelukast 10 milliGRAM(s) Oral at bedtime  mupirocin 2% Nasal 1 Application(s) Both Nostrils two times a day  NIFEdipine XL 60 milliGRAM(s) Oral daily  Ohtuvayre 3 mg/2.5 mL Inhalation 3 milliGRAM(s) 3 milliGRAM(s) Nebulizer every 12 hours  pantoprazole    Tablet 40 milliGRAM(s) Oral before breakfast  polyethylene glycol 3350 17 Gram(s) Oral daily  predniSONE   Tablet 40 milliGRAM(s) Oral daily  rosuvastatin 5 milliGRAM(s) Oral at bedtime  senna 2 Tablet(s) Oral at bedtime  sertraline 50 milliGRAM(s) Oral daily  silver sulfADIAZINE 1% Cream 1 Application(s) Topical daily  sodium chloride 0.9%. 1000 milliLiter(s) (75 mL/Hr) IV Continuous <Continuous>  sodium chloride 7% Inhalation 4 milliLiter(s) Inhalation every 12 hours  tiotropium 2.5 MICROgram(s) Inhaler 2 Puff(s) Inhalation daily  vancomycin  IVPB 750 milliGRAM(s) IV Intermittent every 12 hours    MEDICATIONS  (PRN):  dextrose Oral Gel 15 Gram(s) Oral once PRN Blood Glucose LESS THAN 70 milliGRAM(s)/deciliter  lactulose Syrup 10 Gram(s) Oral daily PRN constipation  magnesium hydroxide Suspension 30 milliLiter(s) Oral daily PRN Constipation  melatonin 3 milliGRAM(s) Oral at bedtime PRN Insomnia  ondansetron Injectable 4 milliGRAM(s) IV Push every 8 hours PRN Nausea and/or Vomiting      Vital Signs Last 24 Hrs  T(C): 36.7 (13 Jun 2025 11:07), Max: 36.9 (13 Jun 2025 05:40)  T(F): 98.1 (13 Jun 2025 11:07), Max: 98.4 (13 Jun 2025 05:40)  HR: 71 (13 Jun 2025 11:07) (51 - 71)  BP: 112/69 (13 Jun 2025 11:07) (112/69 - 126/81)  BP(mean): --  RR: 18 (13 Jun 2025 11:07) (18 - 18)  SpO2: 95% (13 Jun 2025 11:07) (95% - 98%)    Parameters below as of 13 Jun 2025 11:07  Patient On (Oxygen Delivery Method): room air      CAPILLARY BLOOD GLUCOSE      POCT Blood Glucose.: 265 mg/dL (13 Jun 2025 11:12)  POCT Blood Glucose.: 150 mg/dL (13 Jun 2025 07:41)  POCT Blood Glucose.: 278 mg/dL (12 Jun 2025 21:26)  POCT Blood Glucose.: 322 mg/dL (12 Jun 2025 17:14)    I&O's Summary    12 Jun 2025 07:01  -  13 Jun 2025 07:00  --------------------------------------------------------  IN: 220 mL / OUT: 1100 mL / NET: -880 mL          PHYSICAL EXAM:  GENERAL: NAD, breathing not labored   EYES: conjunctiva and sclera clear  NECK: supple, No JVD  CHEST/LUNG: b/l rhonchi (improved)  HEART: S1 S2 RRR  ABDOMEN: +BS Soft, NT/ND  EXTREMITIES:  2+ DP Pulses, No c/c. no LE edema  NEUROLOGY: AAOx3, no facial droop, moving all extremities     LABS:                        12.5   19.12 )-----------( 182      ( 13 Jun 2025 11:05 )             41.0     06-13    136  |  102  |  18  ----------------------------<  279[H]  4.4   |  21[L]  |  0.59    Ca    8.7      13 Jun 2025 11:05    TPro  5.8[L]  /  Alb  3.2[L]  /  TBili  0.2  /  DBili  x   /  AST  19  /  ALT  27  /  AlkPhos  67  06-13          Urinalysis Basic - ( 13 Jun 2025 11:05 )    Color: x / Appearance: x / SG: x / pH: x  Gluc: 279 mg/dL / Ketone: x  / Bili: x / Urobili: x   Blood: x / Protein: x / Nitrite: x   Leuk Esterase: x / RBC: x / WBC x   Sq Epi: x / Non Sq Epi: x / Bacteria: x        RADIOLOGY & ADDITIONAL TESTS:    Imaging Personally Reviewed:  Consultant(s) Notes Reviewed:    Care Discussed with Consultants/Other Providers:

## 2025-06-13 NOTE — CONSULT NOTE ADULT - PROBLEM SELECTOR RECOMMENDATION 6
For details about [facility], please see the [facility] note above. However, in summary: The patient with a history of numerous medical interventions, including ICU stays, is currently being treated for pneumonia and expresses a desire to live as long as possible, electing for full code status. She will only consider withdrawing life support if lacking brain stem reflexes. She is enrolled in the [facility] program. The current plans are for seizure evaluation, and emphasizes adherence to chest vest and CPAP use, with a referral for physical therapy in process.

## 2025-06-13 NOTE — CONSULT NOTE ADULT - ASSESSMENT
full note to f/u. Please see the [facility] note above.         Adarsh Guerrero MD  Associate Chief Geriatrics and Palliative Care (GaP) [facility]   GaP Consult Service   , [employer] at [facility]      Please contact me via Teams from Monday through Friday between 9am-5pm. If not answering, please call the palliative care pager [phone]    After 5pm and on weekends, please see the contact information below:    In the event of newly developing, evolving, or worsening symptoms, please contact the Palliative Medicine team via pager (if the patient is at [facility] #[phone] or if the patient is at [facility] #[phone]) The Geriatric and Palliative Medicine service has coverage 24 hours a day/ 7 days a week to provide medical recommendations regarding symptom management needs via telephone 74-year-old female with a complex medical history including epilepsy, COPD, asthma, diabetes, cardiovascular complications, colon cancer, and neuropathy, presents with shortness of breath and productive cough. She has been intermittently confused following a recent hospitalization for visual hallucinations and falls, during which she was treated for MRSA pneumonia and bacteremia with Bactrim. This time, she is getting treated for Sepsis (likely 2/2 to PNA) and COPD exacerbation. The GaP Team is involved for goals of care and advanced care planning amid ongoing multi-disciplinary management.

## 2025-06-13 NOTE — CONSULT NOTE ADULT - PROBLEM SELECTOR RECOMMENDATION 7
Goals of care and advanced care planning are defined.   We will sign off.     Adarsh Guerrero MD  Associate Chief Geriatrics and Palliative Care (GaP) [facility]   GaP Consult Service   , [employer] at [facility]      Please contact me via Teams from Monday through Friday between 9am-5pm. If not answering, please call the palliative care pager [phone]    After 5pm and on weekends, please see the contact information below:    In the event of newly developing, evolving, or worsening symptoms, please contact the Palliative Medicine team via pager (if the patient is at [facility] #[phone] or if the patient is at [facility] #[phone]) The Geriatric and Palliative Medicine service has coverage 24 hours a day/ 7 days a week to provide medical recommendations regarding symptom management needs via telephone

## 2025-06-14 LAB
ANION GAP SERPL CALC-SCNC: 15 MMOL/L — SIGNIFICANT CHANGE UP (ref 5–17)
BUN SERPL-MCNC: 17 MG/DL — SIGNIFICANT CHANGE UP (ref 7–23)
CALCIUM SERPL-MCNC: 8.7 MG/DL — SIGNIFICANT CHANGE UP (ref 8.4–10.5)
CHLORIDE SERPL-SCNC: 104 MMOL/L — SIGNIFICANT CHANGE UP (ref 96–108)
CO2 SERPL-SCNC: 22 MMOL/L — SIGNIFICANT CHANGE UP (ref 22–31)
CREAT SERPL-MCNC: 0.61 MG/DL — SIGNIFICANT CHANGE UP (ref 0.5–1.3)
EGFR: 93 ML/MIN/1.73M2 — SIGNIFICANT CHANGE UP
EGFR: 93 ML/MIN/1.73M2 — SIGNIFICANT CHANGE UP
GLUCOSE BLDC GLUCOMTR-MCNC: 146 MG/DL — HIGH (ref 70–99)
GLUCOSE BLDC GLUCOMTR-MCNC: 285 MG/DL — HIGH (ref 70–99)
GLUCOSE BLDC GLUCOMTR-MCNC: 292 MG/DL — HIGH (ref 70–99)
GLUCOSE BLDC GLUCOMTR-MCNC: 81 MG/DL — SIGNIFICANT CHANGE UP (ref 70–99)
GLUCOSE SERPL-MCNC: 125 MG/DL — HIGH (ref 70–99)
HCT VFR BLD CALC: 43.8 % — SIGNIFICANT CHANGE UP (ref 34.5–45)
HGB BLD-MCNC: 13.4 G/DL — SIGNIFICANT CHANGE UP (ref 11.5–15.5)
MCHC RBC-ENTMCNC: 23.6 PG — LOW (ref 27–34)
MCHC RBC-ENTMCNC: 30.6 G/DL — LOW (ref 32–36)
MCV RBC AUTO: 77.1 FL — LOW (ref 80–100)
NRBC BLD AUTO-RTO: 0 /100 WBCS — SIGNIFICANT CHANGE UP (ref 0–0)
PLATELET # BLD AUTO: 251 K/UL — SIGNIFICANT CHANGE UP (ref 150–400)
POTASSIUM SERPL-MCNC: 4.5 MMOL/L — SIGNIFICANT CHANGE UP (ref 3.5–5.3)
POTASSIUM SERPL-SCNC: 4.5 MMOL/L — SIGNIFICANT CHANGE UP (ref 3.5–5.3)
RBC # BLD: 5.68 M/UL — HIGH (ref 3.8–5.2)
RBC # FLD: 16.7 % — HIGH (ref 10.3–14.5)
SODIUM SERPL-SCNC: 141 MMOL/L — SIGNIFICANT CHANGE UP (ref 135–145)
WBC # BLD: 13.74 K/UL — HIGH (ref 3.8–10.5)
WBC # FLD AUTO: 13.74 K/UL — HIGH (ref 3.8–10.5)

## 2025-06-14 PROCEDURE — 99233 SBSQ HOSP IP/OBS HIGH 50: CPT

## 2025-06-14 RX ADMIN — Medication 1 DOSE(S): at 17:55

## 2025-06-14 RX ADMIN — MEXILETINE HYDROCHLORIDE 200 MILLIGRAM(S): 250 CAPSULE ORAL at 05:41

## 2025-06-14 RX ADMIN — Medication 250 MILLIGRAM(S): at 00:44

## 2025-06-14 RX ADMIN — Medication 20 MILLIGRAM(S): at 12:29

## 2025-06-14 RX ADMIN — LEVETIRACETAM 1000 MILLIGRAM(S): 10 INJECTION, SOLUTION INTRAVENOUS at 05:40

## 2025-06-14 RX ADMIN — Medication 40 MILLIGRAM(S): at 05:41

## 2025-06-14 RX ADMIN — Medication 2.5 MILLIGRAM(S): at 17:55

## 2025-06-14 RX ADMIN — Medication 1 DROP(S): at 21:27

## 2025-06-14 RX ADMIN — LACOSAMIDE 100 MILLIGRAM(S): 150 TABLET, FILM COATED ORAL at 17:54

## 2025-06-14 RX ADMIN — LEVETIRACETAM 1000 MILLIGRAM(S): 10 INJECTION, SOLUTION INTRAVENOUS at 17:54

## 2025-06-14 RX ADMIN — MUPIROCIN CALCIUM 1 APPLICATION(S): 20 CREAM TOPICAL at 05:39

## 2025-06-14 RX ADMIN — MONTELUKAST SODIUM 10 MILLIGRAM(S): 10 TABLET ORAL at 21:27

## 2025-06-14 RX ADMIN — Medication 1 DROP(S): at 05:41

## 2025-06-14 RX ADMIN — Medication 1 DROP(S): at 17:53

## 2025-06-14 RX ADMIN — SERTRALINE 50 MILLIGRAM(S): 100 TABLET, FILM COATED ORAL at 12:30

## 2025-06-14 RX ADMIN — Medication 1 APPLICATION(S): at 12:29

## 2025-06-14 RX ADMIN — Medication 4 MILLILITER(S): at 17:56

## 2025-06-14 RX ADMIN — PREDNISONE 40 MILLIGRAM(S): 20 TABLET ORAL at 05:39

## 2025-06-14 RX ADMIN — BUDESONIDE 1 MILLIGRAM(S): 0.25 SUSPENSION RESPIRATORY (INHALATION) at 17:55

## 2025-06-14 RX ADMIN — Medication 60 MILLIGRAM(S): at 05:41

## 2025-06-14 RX ADMIN — INSULIN LISPRO 4 UNIT(S): 100 INJECTION, SOLUTION INTRAVENOUS; SUBCUTANEOUS at 17:54

## 2025-06-14 RX ADMIN — APIXABAN 5 MILLIGRAM(S): 2.5 TABLET, FILM COATED ORAL at 05:41

## 2025-06-14 RX ADMIN — MEXILETINE HYDROCHLORIDE 200 MILLIGRAM(S): 250 CAPSULE ORAL at 21:27

## 2025-06-14 RX ADMIN — LACOSAMIDE 100 MILLIGRAM(S): 150 TABLET, FILM COATED ORAL at 05:40

## 2025-06-14 RX ADMIN — FORMOTEROL FUMARATE DIHYDRATE 20 MICROGRAM(S): 20 SOLUTION RESPIRATORY (INHALATION) at 17:58

## 2025-06-14 RX ADMIN — MUPIROCIN CALCIUM 1 APPLICATION(S): 20 CREAM TOPICAL at 17:54

## 2025-06-14 RX ADMIN — APIXABAN 5 MILLIGRAM(S): 2.5 TABLET, FILM COATED ORAL at 17:52

## 2025-06-14 RX ADMIN — BUDESONIDE 1 MILLIGRAM(S): 0.25 SUSPENSION RESPIRATORY (INHALATION) at 05:38

## 2025-06-14 RX ADMIN — ROSUVASTATIN CALCIUM 5 MILLIGRAM(S): 20 TABLET, FILM COATED ORAL at 22:22

## 2025-06-14 RX ADMIN — LACTULOSE 10 GRAM(S): 10 SOLUTION ORAL at 12:31

## 2025-06-14 RX ADMIN — Medication 4 MILLILITER(S): at 05:39

## 2025-06-14 RX ADMIN — MEXILETINE HYDROCHLORIDE 200 MILLIGRAM(S): 250 CAPSULE ORAL at 12:29

## 2025-06-14 RX ADMIN — INSULIN LISPRO 3: 100 INJECTION, SOLUTION INTRAVENOUS; SUBCUTANEOUS at 17:53

## 2025-06-14 RX ADMIN — TIOTROPIUM BROMIDE INHALATION SPRAY 2 PUFF(S): 3.12 SPRAY, METERED RESPIRATORY (INHALATION) at 12:32

## 2025-06-14 RX ADMIN — INSULIN LISPRO 4 UNIT(S): 100 INJECTION, SOLUTION INTRAVENOUS; SUBCUTANEOUS at 12:02

## 2025-06-14 RX ADMIN — INSULIN GLARGINE-YFGN 32 UNIT(S): 100 INJECTION, SOLUTION SUBCUTANEOUS at 21:28

## 2025-06-14 RX ADMIN — Medication 250 MILLIGRAM(S): at 12:30

## 2025-06-14 RX ADMIN — Medication 2.5 MILLIGRAM(S): at 12:32

## 2025-06-14 RX ADMIN — Medication 1 APPLICATION(S): at 12:33

## 2025-06-14 RX ADMIN — Medication 1 DROP(S): at 13:20

## 2025-06-14 RX ADMIN — Medication 2 TABLET(S): at 21:27

## 2025-06-14 RX ADMIN — Medication 1 DOSE(S): at 05:43

## 2025-06-14 RX ADMIN — Medication 2.5 MILLIGRAM(S): at 05:38

## 2025-06-14 NOTE — PROGRESS NOTE ADULT - PROBLEM SELECTOR PLAN 2
with exacerbation  Hemoptysis- mild  Home Regimen: Albuterol Q6H -> Chest Vest -> Perforomist BID  -> Pulmicort BID, - > Ohtuvayre 2.5 BID - > HyperSal 7% Q12H. Patient also on Breo Ellipta 200 at 1 inhalation QD, Incruse Ellipta 1 puff QD, and on Tezspire airway   Pulm recs appreciated  Continue home montelukast, albuterol Q6H, Perforomist and Ohtuvayre (patient own medication), Pulmicort. Advair/Spiriva (in place of Breo/Incruse, not on formulary). Tezspire as OP  restarted hypersaline Hold home glycopyrrolate in order to collect secretions for sputum culture.  Takes methylprednisolone 8mg daily at home - > increase to prednisone 40mg daily for now per pulmonology  chest PT w/ home vest and home CPAP QHS

## 2025-06-14 NOTE — PROGRESS NOTE ADULT - SUBJECTIVE AND OBJECTIVE BOX
MR#[id number]  PATIENT NAME:DARIEL RODRIGUEZ    DATE OF SERVICE: 06-14-25 @ 07:44  Patient was seen and examined by Jaydon Bruce MD on    06-14-25 @ 07:44 .  Interim events noted.Consultant notes ,Labs,Telemetry reviewed by me       Covering for [facility] Cardiology Consultants -Ryan Jung, Le, Jim Pablo, Sridhar Newsome  Office Number: [phone]         HOSPITAL COURSE: HPI:  73 yo F with a PMH of Epilepsy on Keppra/lacosamide, COPD, asthma  (on CPAP at home, on chronic steroids), DM2, bronchiectasis, tracheomalacia s/p tracheloplasty, HTN, Hx of dissection of descending thoracic aorta, hx of aortic aneurysm, Type B dissection (7/2024), medically managed initially as she did not meet criteria for surgery at that time), evaluated by Dr. 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     History obtained from daughter, Zoe, as patient not responding to questions, those eyes open spontaneously to voice and light touch.   She reports that patient intermittently confused since most recent hospitalization.  She noted worsening productive cough. Also with N/V- non-bloody, non-bilious. No fevers at home. She was recently started on Bactrim by her outpatient pulmonologist, Dr. Allison, for MRSA in sputum.    Per report, patient had ?hemoptysis during CT chest, however, not collected.  (09 Jun 2025 15:21)      INTERIM EVENTS:Patient seen at bedside ,interim events noted.  Awake- Remains on RA  Sputum and blood cx positive for MRSA  Repeat 6/11 cleared    PMH -reviewed admission note, no change since admission    AMBULATION: Assisted[ ] Cane/walker[ ] Independent[ ]    MEDICATIONS  (STANDING):  albuterol    0.083% 2.5 milliGRAM(s) Nebulizer every 6 hours  apixaban 5 milliGRAM(s) Oral two times a day  artificial  tears Solution 1 Drop(s) Both EYES every 4 hours  budesonide    Inhalation Suspension 1 milliGRAM(s) Inhalation two times a day  chlorhexidine 2% Cloths 1 Application(s) Topical daily  famotidine    Tablet 20 milliGRAM(s) Oral daily  fluticasone propionate/ salmeterol 250-50 MICROgram(s) Diskus 1 Dose(s) Inhalation two times a day  formoterol for nebulization 20 MICROGram(s) Nebulizer every 12 hours  glucagon  Injectable 1 milliGRAM(s) IntraMuscular once  insulin glargine Injectable (LANTUS) 32 Unit(s) SubCutaneous at bedtime  insulin lispro (ADMELOG) corrective regimen sliding scale   SubCutaneous three times a day before meals  insulin lispro Injectable (ADMELOG) 2 Unit(s) SubCutaneous before breakfast  insulin lispro Injectable (ADMELOG) 4 Unit(s) SubCutaneous before lunch  insulin lispro Injectable (ADMELOG) 4 Unit(s) SubCutaneous before dinner  lacosamide 100 milliGRAM(s) Oral two times a day  levETIRAcetam 1000 milliGRAM(s) Oral two times a day  mexiletine 200 milliGRAM(s) Oral three times a day  mineral oil 30 milliLiter(s) Oral daily  montelukast 10 milliGRAM(s) Oral at bedtime  mupirocin 2% Nasal 1 Application(s) Both Nostrils two times a day  NIFEdipine XL 60 milliGRAM(s) Oral daily  Ohtuvayre 3 mg/2.5 mL Inhalation 3 milliGRAM(s) 3 milliGRAM(s) Nebulizer every 12 hours  pantoprazole    Tablet 40 milliGRAM(s) Oral before breakfast  polyethylene glycol 3350 17 Gram(s) Oral daily  rosuvastatin 5 milliGRAM(s) Oral at bedtime  senna 2 Tablet(s) Oral at bedtime  sertraline 50 milliGRAM(s) Oral daily  silver sulfADIAZINE 1% Cream 1 Application(s) Topical daily  sodium chloride 0.9%. 1000 milliLiter(s) (75 mL/Hr) IV Continuous <Continuous>  sodium chloride 7% Inhalation 4 milliLiter(s) Inhalation every 12 hours  tiotropium 2.5 MICROgram(s) Inhaler 2 Puff(s) Inhalation daily  vancomycin  IVPB 750 milliGRAM(s) IV Intermittent every 12 hours    MEDICATIONS  (PRN):  dextrose Oral Gel 15 Gram(s) Oral once PRN Blood Glucose LESS THAN 70 milliGRAM(s)/deciliter  lactulose Syrup 10 Gram(s) Oral daily PRN constipation  magnesium hydroxide Suspension 30 milliLiter(s) Oral daily PRN Constipation  melatonin 3 milliGRAM(s) Oral at bedtime PRN Insomnia  ondansetron Injectable 4 milliGRAM(s) IV Push every 8 hours PRN Nausea and/or Vomiting            REVIEW OF SYSTEMS:  Constitutional: [ ] fever, [ ]weight loss,  [x ]fatigue [ ]weight gain  Eyes: [ ] visual changes  Respiratory: [ ]shortness of breath;  [x ] cough, [ ]wheezing, [ ]chills, [x ]hemoptysis  Cardiovascular: [ ] chest pain, [ ]palpitations, [ ]dizziness,  [ ]leg swelling[ ]orthopnea[ ]PND  Gastrointestinal: [ ] abdominal pain, [ ]nausea, [ ]vomiting,  [ ]diarrhea [ ]Constipation [ ]Melena  Genitourinary: [ ] dysuria, [ ] hematuria [ ]Amezcua  Neurologic: [ ] headaches [ ] tremors[ ]weakness [ ]Paralysis Right[ ] Left[ ]  Skin: [ ] itching, [ ]burning, [ ] rashes  Endocrine: [ ] heat or cold intolerance  Musculoskeletal: [ ] joint pain or swelling; [ ] muscle, back, or extremity pain  Psychiatric: [ ] depression, [ ]anxiety, [ ]mood swings, or [ ]difficulty sleeping  Hematologic: [ ] easy bruising, [ ] bleeding gums    [ ] All remaining systems negative except as per above.   [ ]Unable to obtain.  [x] No change in ROS since admission      Vital Signs Last 24 Hrs  T(C): 37 (14 Jun 2025 05:15), Max: 37 (14 Jun 2025 05:15)  T(F): 98.6 (14 Jun 2025 05:15), Max: 98.6 (14 Jun 2025 05:15)  HR: 66 (14 Jun 2025 05:15) (66 - 72)  BP: 139/80 (14 Jun 2025 05:15) (112/69 - 139/80)  RR: 18 (14 Jun 2025 05:15) (18 - 18)  SpO2: 96% (14 Jun 2025 05:15) (95% - 97%)    Parameters below as of 14 Jun 2025 05:15  Patient On (Oxygen Delivery Method): room air      I&O's Summary    13 Jun 2025 07:01  -  14 Jun 2025 07:00  --------------------------------------------------------  IN: 200 mL / OUT: 700 mL / NET: -500 mL        PHYSICAL EXAM:  General: No acute distress BMI-24  HEENT: EOMI, PERRL  Neck: Supple, [ ] JVD  Lungs: Equal air entry bilaterally; [ ] rales [ ] wheezing [ ] rhonchi  Heart: Irregular rate and rhythm; [x ] murmur   2/6 [ x] systolic [ ] diastolic [ ] radiation[ ] rubs [ ]  gallops  Abdomen: Nontender, bowel sounds present  Extremities: No clubbing, cyanosis, [ ] edema [ ]Pulses  equal and intact  Nervous system:  Alert & Oriented X3, no focal deficits  Psychiatric: Normal affect  Skin: No rashes or lesions    LABS:  06-13    136  |  102  |  18  ----------------------------<  279[H]  4.4   |  21[L]  |  0.59    Ca    8.7      13 Jun 2025 11:05    TPro  5.8[L]  /  Alb  3.2[L]  /  TBili  0.2  /  DBili  x   /  AST  19  /  ALT  27  /  AlkPhos  67  06-13    Creatinine Trend: 0.59<--, 0.64<--, 0.59<--, 0.70<--, 0.90<--                        12.5   19.12 )-----------( 182      ( 13 Jun 2025 11:05 )             41.0       TTE 6/11/25:  CONCLUSIONS:   1. Left ventricular systolic function is hyperdynamic with an ejection fraction of 81 % by Felipe's method of disks. There are no regional wall motion abnormalities seen.   2. Mild left ventricular hypertrophy.   3. There is moderate (grade 2) left ventricular diastolic dysfunction.   4. Enlarged right ventricular cavity size and mild to moderately reduced right ventricular systolic function. Tricuspid annular plane systolic excursion (TAPSE) is 1.1 cm (normal >=1.7 cm). Tricuspid annular tissue Doppler S' is 10.0 cm/s (normal >10 cm/s).   5. Moderate mitral regurgitation.   6. Estimated pulmonary artery systolic pressure is 46 mmHg.   7. No significant valvular disease.   8. The inferior vena cava is normal in size (normal <2.1cm) with normal inspiratory collapse (normal >50%) consistent with normal right atrial pressure (~3, range 0-5mmHg).   9. Compared to the transthoracic echocardiogram performed on 7/3/2024, there have been no significant interval changes.        12 Lead ECG (06.09.25 @ 05:06) >  WIDE QRS RHYTHM AF  RIGHT BUNDLE BRANCH BLOCK  LEFT ANTERIOR FASCICULAR BLOCK  *** BIFASCICULAR BLOCK ***  ABNORMAL ECG        CT Abdomen and Pelvis w/ IV Cont (06.12.25 @ 15:12) >  IMPRESSION:  Patchycortical hypoenhancement in the bilateral renal lower poles,  possibly pyelonephritis.      CT Chest No Cont (06.09.25 @ 15:14) >  IMPRESSION:  Multifocal pneumonia. Recommend follow-up chest CT in 3 months to ensure  clearance.

## 2025-06-14 NOTE — PROGRESS NOTE ADULT - PROBLEM SELECTOR PLAN 1
--Met SIRS with leukocytosis and tachpnyea (RR 22) on initial presentation. Present on admission.   --Multifocal Pneumonia on Imaging.   --Bacteremia- MRSA 3/4 bottles, repeat 6/11 1/4+GPC in clusters, repeat bx q48 hours     TTE without endocarditis - SAFIA ordered   monitor repeat blood cultures   c/w vancomycin - monitor trough  ID and pulmonology following  Patient with many antibiotic allergies

## 2025-06-14 NOTE — PROGRESS NOTE ADULT - PROBLEM SELECTOR PLAN 3
A1c 8.5  Takes Lantus 22u in AM and 28u in PM.  c/w Lantus 32u QHS   added premeal admelog 2/4/4 units   Fs better controlled  Monitor FS

## 2025-06-14 NOTE — PROGRESS NOTE ADULT - SUBJECTIVE AND OBJECTIVE BOX
[facility] Division of Hospital Medicine  Terrell Felix MD  Available via [employer] Teams    SUBJECTIVE / OVERNIGHT EVENTS:  no events overnight, seen and examined at bedside    ADDITIONAL REVIEW OF SYSTEMS:    MEDICATIONS  (STANDING):  albuterol    0.083% 2.5 milliGRAM(s) Nebulizer every 6 hours  apixaban 5 milliGRAM(s) Oral two times a day  artificial  tears Solution 1 Drop(s) Both EYES every 4 hours  budesonide    Inhalation Suspension 1 milliGRAM(s) Inhalation two times a day  chlorhexidine 2% Cloths 1 Application(s) Topical daily  dextrose 5%. 1000 milliLiter(s) (100 mL/Hr) IV Continuous <Continuous>  dextrose 5%. 1000 milliLiter(s) (50 mL/Hr) IV Continuous <Continuous>  dextrose 50% Injectable 25 Gram(s) IV Push once  dextrose 50% Injectable 12.5 Gram(s) IV Push once  dextrose 50% Injectable 25 Gram(s) IV Push once  famotidine    Tablet 20 milliGRAM(s) Oral daily  fluticasone propionate/ salmeterol 250-50 MICROgram(s) Diskus 1 Dose(s) Inhalation two times a day  formoterol for nebulization 20 MICROGram(s) Nebulizer every 12 hours  glucagon  Injectable 1 milliGRAM(s) IntraMuscular once  insulin glargine Injectable (LANTUS) 32 Unit(s) SubCutaneous at bedtime  insulin lispro (ADMELOG) corrective regimen sliding scale   SubCutaneous three times a day before meals  insulin lispro Injectable (ADMELOG) 2 Unit(s) SubCutaneous before breakfast  insulin lispro Injectable (ADMELOG) 4 Unit(s) SubCutaneous before lunch  insulin lispro Injectable (ADMELOG) 4 Unit(s) SubCutaneous before dinner  lacosamide 100 milliGRAM(s) Oral two times a day  levETIRAcetam 1000 milliGRAM(s) Oral two times a day  mexiletine 200 milliGRAM(s) Oral three times a day  mineral oil 30 milliLiter(s) Oral daily  montelukast 10 milliGRAM(s) Oral at bedtime  mupirocin 2% Nasal 1 Application(s) Both Nostrils two times a day  NIFEdipine XL 60 milliGRAM(s) Oral daily  Ohtuvayre 3 mg/2.5 mL Inhalation 3 milliGRAM(s) 3 milliGRAM(s) Nebulizer every 12 hours  pantoprazole    Tablet 40 milliGRAM(s) Oral before breakfast  polyethylene glycol 3350 17 Gram(s) Oral daily  rosuvastatin 5 milliGRAM(s) Oral at bedtime  senna 2 Tablet(s) Oral at bedtime  sertraline 50 milliGRAM(s) Oral daily  silver sulfADIAZINE 1% Cream 1 Application(s) Topical daily  sodium chloride 0.9%. 1000 milliLiter(s) (75 mL/Hr) IV Continuous <Continuous>  sodium chloride 7% Inhalation 4 milliLiter(s) Inhalation every 12 hours  tiotropium 2.5 MICROgram(s) Inhaler 2 Puff(s) Inhalation daily  vancomycin  IVPB 750 milliGRAM(s) IV Intermittent every 12 hours    MEDICATIONS  (PRN):  dextrose Oral Gel 15 Gram(s) Oral once PRN Blood Glucose LESS THAN 70 milliGRAM(s)/deciliter  lactulose Syrup 10 Gram(s) Oral daily PRN constipation  magnesium hydroxide Suspension 30 milliLiter(s) Oral daily PRN Constipation  melatonin 3 milliGRAM(s) Oral at bedtime PRN Insomnia  ondansetron Injectable 4 milliGRAM(s) IV Push every 8 hours PRN Nausea and/or Vomiting      I&O's Summary    13 Jun 2025 07:01  -  14 Jun 2025 07:00  --------------------------------------------------------  IN: 200 mL / OUT: 700 mL / NET: -500 mL        PHYSICAL EXAM:  Vital Signs Last 24 Hrs  T(C): 37 (14 Jun 2025 05:15), Max: 37 (14 Jun 2025 05:15)  T(F): 98.6 (14 Jun 2025 05:15), Max: 98.6 (14 Jun 2025 05:15)  HR: 66 (14 Jun 2025 05:15) (66 - 72)  BP: 139/80 (14 Jun 2025 05:15) (112/69 - 139/80)  BP(mean): --  RR: 18 (14 Jun 2025 05:15) (18 - 18)  SpO2: 96% (14 Jun 2025 05:15) (95% - 97%)    Parameters below as of 14 Jun 2025 05:15  Patient On (Oxygen Delivery Method): room air    GENERAL: NAD, breathing not labored   EYES: conjunctiva and sclera clear  NECK: supple, No JVD  CHEST/LUNG: b/l rhonchi (improved)  HEART: S1 S2 RRR  ABDOMEN: +BS Soft, NT/ND  EXTREMITIES:  2+ DP Pulses, No c/c. no LE edema  NEUROLOGY: AAOx3, no facial droop, moving all extremities           LABS:                        12.5   19.12 )-----------( 182      ( 13 Jun 2025 11:05 )             41.0     06-13    136  |  102  |  18  ----------------------------<  279[H]  4.4   |  21[L]  |  0.59    Ca    8.7      13 Jun 2025 11:05    TPro  5.8[L]  /  Alb  3.2[L]  /  TBili  0.2  /  DBili  x   /  AST  19  /  ALT  27  /  AlkPhos  67  06-13          Urinalysis Basic - ( 13 Jun 2025 11:05 )    Color: x / Appearance: x / SG: x / pH: x  Gluc: 279 mg/dL / Ketone: x  / Bili: x / Urobili: x   Blood: x / Protein: x / Nitrite: x   Leuk Esterase: x / RBC: x / WBC x   Sq Epi: x / Non Sq Epi: x / Bacteria: x        SARS-CoV-2: NotDetec (18 Apr 2025 12:25)  SARS-CoV-2: NotDetec (08 Jan 2025 11:50)      RADIOLOGY & ADDITIONAL TESTS:  New Imaging Personally Reviewed Today:  New Electrocardiogram Personally Reviewed Today:  Other Results Reviewed Today:   Prior or Outpatient Records Reviewed Today with Summary:    COORDINATION OF CARE:  Consultant Communication and Details of Discussion (where applicable):

## 2025-06-14 NOTE — PROGRESS NOTE ADULT - ASSESSMENT
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

## 2025-06-14 NOTE — PROGRESS NOTE ADULT - TIME BILLING
- Review of records, telemetry, vital signs and daily labs.   - General and cardiovascular physical examination.  - Generation of cardiovascular treatment plan and completion of note .  - Coordination of care-discussed with ACP, and  on disposition plan .      Patient was seen and examined by me on  06/14/2025 ,interim events noted,labs and radiology studies reviewed.  Jaydon Bruce MD,FACC.  [address] [place] [address].  [facility].  [phone]  Availabe to call or text on [employer] TEAMS.

## 2025-06-15 LAB
ANION GAP SERPL CALC-SCNC: 13 MMOL/L — SIGNIFICANT CHANGE UP (ref 5–17)
BUN SERPL-MCNC: 22 MG/DL — SIGNIFICANT CHANGE UP (ref 7–23)
CALCIUM SERPL-MCNC: 8.7 MG/DL — SIGNIFICANT CHANGE UP (ref 8.4–10.5)
CHLORIDE SERPL-SCNC: 105 MMOL/L — SIGNIFICANT CHANGE UP (ref 96–108)
CO2 SERPL-SCNC: 22 MMOL/L — SIGNIFICANT CHANGE UP (ref 22–31)
CREAT SERPL-MCNC: 0.64 MG/DL — SIGNIFICANT CHANGE UP (ref 0.5–1.3)
CRP SERPL-MCNC: 8 MG/L — HIGH (ref 0–4)
EGFR: 92 ML/MIN/1.73M2 — SIGNIFICANT CHANGE UP
EGFR: 92 ML/MIN/1.73M2 — SIGNIFICANT CHANGE UP
GLUCOSE BLDC GLUCOMTR-MCNC: 127 MG/DL — HIGH (ref 70–99)
GLUCOSE BLDC GLUCOMTR-MCNC: 174 MG/DL — HIGH (ref 70–99)
GLUCOSE BLDC GLUCOMTR-MCNC: 187 MG/DL — HIGH (ref 70–99)
GLUCOSE BLDC GLUCOMTR-MCNC: 212 MG/DL — HIGH (ref 70–99)
GLUCOSE BLDC GLUCOMTR-MCNC: 216 MG/DL — HIGH (ref 70–99)
GLUCOSE SERPL-MCNC: 112 MG/DL — HIGH (ref 70–99)
HCT VFR BLD CALC: 40.8 % — SIGNIFICANT CHANGE UP (ref 34.5–45)
HGB BLD-MCNC: 12.9 G/DL — SIGNIFICANT CHANGE UP (ref 11.5–15.5)
MCHC RBC-ENTMCNC: 24.1 PG — LOW (ref 27–34)
MCHC RBC-ENTMCNC: 31.6 G/DL — LOW (ref 32–36)
MCV RBC AUTO: 76.3 FL — LOW (ref 80–100)
NRBC BLD AUTO-RTO: 0 /100 WBCS — SIGNIFICANT CHANGE UP (ref 0–0)
PLATELET # BLD AUTO: 218 K/UL — SIGNIFICANT CHANGE UP (ref 150–400)
POTASSIUM SERPL-MCNC: 3.8 MMOL/L — SIGNIFICANT CHANGE UP (ref 3.5–5.3)
POTASSIUM SERPL-SCNC: 3.8 MMOL/L — SIGNIFICANT CHANGE UP (ref 3.5–5.3)
RBC # BLD: 5.35 M/UL — HIGH (ref 3.8–5.2)
RBC # FLD: 17.1 % — HIGH (ref 10.3–14.5)
SODIUM SERPL-SCNC: 140 MMOL/L — SIGNIFICANT CHANGE UP (ref 135–145)
VANCOMYCIN TROUGH SERPL-MCNC: 10.9 UG/ML — SIGNIFICANT CHANGE UP (ref 10–20)
WBC # BLD: 13.05 K/UL — HIGH (ref 3.8–10.5)
WBC # FLD AUTO: 13.05 K/UL — HIGH (ref 3.8–10.5)

## 2025-06-15 PROCEDURE — 99232 SBSQ HOSP IP/OBS MODERATE 35: CPT

## 2025-06-15 PROCEDURE — G0545: CPT

## 2025-06-15 RX ORDER — ACETAMINOPHEN 500 MG/5ML
1000 LIQUID (ML) ORAL ONCE
Refills: 0 | Status: COMPLETED | OUTPATIENT
Start: 2025-06-15 | End: 2025-06-15

## 2025-06-15 RX ORDER — INSULIN GLARGINE-YFGN 100 [IU]/ML
20 INJECTION, SOLUTION SUBCUTANEOUS AT BEDTIME
Refills: 0 | Status: DISCONTINUED | OUTPATIENT
Start: 2025-06-15 | End: 2025-06-21

## 2025-06-15 RX ORDER — VANCOMYCIN HCL IN 5 % DEXTROSE 1.5G/250ML
1000 PLASTIC BAG, INJECTION (ML) INTRAVENOUS EVERY 12 HOURS
Refills: 0 | Status: DISCONTINUED | OUTPATIENT
Start: 2025-06-15 | End: 2025-06-16

## 2025-06-15 RX ORDER — INSULIN LISPRO 100 U/ML
INJECTION, SOLUTION INTRAVENOUS; SUBCUTANEOUS EVERY 6 HOURS
Refills: 0 | Status: DISCONTINUED | OUTPATIENT
Start: 2025-06-15 | End: 2025-06-17

## 2025-06-15 RX ORDER — METHYLPREDNISOLONE ACETATE 80 MG/ML
8 INJECTION, SUSPENSION INTRA-ARTICULAR; INTRALESIONAL; INTRAMUSCULAR; SOFT TISSUE DAILY
Refills: 0 | Status: DISCONTINUED | OUTPATIENT
Start: 2025-06-15 | End: 2025-06-21

## 2025-06-15 RX ADMIN — Medication 250 MILLIGRAM(S): at 17:31

## 2025-06-15 RX ADMIN — Medication 1 DOSE(S): at 17:20

## 2025-06-15 RX ADMIN — MONTELUKAST SODIUM 10 MILLIGRAM(S): 10 TABLET ORAL at 21:44

## 2025-06-15 RX ADMIN — INSULIN LISPRO 1: 100 INJECTION, SOLUTION INTRAVENOUS; SUBCUTANEOUS at 17:15

## 2025-06-15 RX ADMIN — Medication 2.5 MILLIGRAM(S): at 21:42

## 2025-06-15 RX ADMIN — MEXILETINE HYDROCHLORIDE 200 MILLIGRAM(S): 250 CAPSULE ORAL at 05:43

## 2025-06-15 RX ADMIN — INSULIN LISPRO 2: 100 INJECTION, SOLUTION INTRAVENOUS; SUBCUTANEOUS at 11:45

## 2025-06-15 RX ADMIN — Medication 400 MILLIGRAM(S): at 22:08

## 2025-06-15 RX ADMIN — LACOSAMIDE 100 MILLIGRAM(S): 150 TABLET, FILM COATED ORAL at 17:31

## 2025-06-15 RX ADMIN — FORMOTEROL FUMARATE DIHYDRATE 20 MICROGRAM(S): 20 SOLUTION RESPIRATORY (INHALATION) at 21:43

## 2025-06-15 RX ADMIN — Medication 20 MILLIGRAM(S): at 11:49

## 2025-06-15 RX ADMIN — Medication 2 TABLET(S): at 21:43

## 2025-06-15 RX ADMIN — Medication 1 DOSE(S): at 05:45

## 2025-06-15 RX ADMIN — SERTRALINE 50 MILLIGRAM(S): 100 TABLET, FILM COATED ORAL at 11:47

## 2025-06-15 RX ADMIN — Medication 1 DROP(S): at 21:45

## 2025-06-15 RX ADMIN — Medication 2.5 MILLIGRAM(S): at 11:47

## 2025-06-15 RX ADMIN — Medication 1000 MILLIGRAM(S): at 22:40

## 2025-06-15 RX ADMIN — APIXABAN 5 MILLIGRAM(S): 2.5 TABLET, FILM COATED ORAL at 05:43

## 2025-06-15 RX ADMIN — METHYLPREDNISOLONE ACETATE 8 MILLIGRAM(S): 80 INJECTION, SUSPENSION INTRA-ARTICULAR; INTRALESIONAL; INTRAMUSCULAR; SOFT TISSUE at 06:45

## 2025-06-15 RX ADMIN — POLYETHYLENE GLYCOL 3350 17 GRAM(S): 17 POWDER, FOR SOLUTION ORAL at 11:47

## 2025-06-15 RX ADMIN — INSULIN LISPRO 2 UNIT(S): 100 INJECTION, SOLUTION INTRAVENOUS; SUBCUTANEOUS at 07:49

## 2025-06-15 RX ADMIN — INSULIN LISPRO 4 UNIT(S): 100 INJECTION, SOLUTION INTRAVENOUS; SUBCUTANEOUS at 17:15

## 2025-06-15 RX ADMIN — INSULIN GLARGINE-YFGN 20 UNIT(S): 100 INJECTION, SOLUTION SUBCUTANEOUS at 21:44

## 2025-06-15 RX ADMIN — Medication 250 MILLIGRAM(S): at 00:39

## 2025-06-15 RX ADMIN — MUPIROCIN CALCIUM 1 APPLICATION(S): 20 CREAM TOPICAL at 05:46

## 2025-06-15 RX ADMIN — LEVETIRACETAM 1000 MILLIGRAM(S): 10 INJECTION, SOLUTION INTRAVENOUS at 17:18

## 2025-06-15 RX ADMIN — Medication 1 DROP(S): at 13:45

## 2025-06-15 RX ADMIN — Medication 60 MILLIGRAM(S): at 05:45

## 2025-06-15 RX ADMIN — TIOTROPIUM BROMIDE INHALATION SPRAY 2 PUFF(S): 3.12 SPRAY, METERED RESPIRATORY (INHALATION) at 11:48

## 2025-06-15 RX ADMIN — Medication 40 MILLIGRAM(S): at 05:43

## 2025-06-15 RX ADMIN — Medication 30 MILLILITER(S): at 11:49

## 2025-06-15 RX ADMIN — INSULIN LISPRO 4 UNIT(S): 100 INJECTION, SOLUTION INTRAVENOUS; SUBCUTANEOUS at 11:45

## 2025-06-15 RX ADMIN — ROSUVASTATIN CALCIUM 5 MILLIGRAM(S): 20 TABLET, FILM COATED ORAL at 21:44

## 2025-06-15 RX ADMIN — LEVETIRACETAM 1000 MILLIGRAM(S): 10 INJECTION, SOLUTION INTRAVENOUS at 05:43

## 2025-06-15 RX ADMIN — BUDESONIDE 1 MILLIGRAM(S): 0.25 SUSPENSION RESPIRATORY (INHALATION) at 21:42

## 2025-06-15 RX ADMIN — MEXILETINE HYDROCHLORIDE 200 MILLIGRAM(S): 250 CAPSULE ORAL at 14:00

## 2025-06-15 RX ADMIN — Medication 2.5 MILLIGRAM(S): at 05:46

## 2025-06-15 RX ADMIN — Medication 1 DROP(S): at 17:20

## 2025-06-15 RX ADMIN — APIXABAN 5 MILLIGRAM(S): 2.5 TABLET, FILM COATED ORAL at 17:18

## 2025-06-15 RX ADMIN — MEXILETINE HYDROCHLORIDE 200 MILLIGRAM(S): 250 CAPSULE ORAL at 21:43

## 2025-06-15 RX ADMIN — Medication 4 MILLILITER(S): at 21:43

## 2025-06-15 RX ADMIN — Medication 1 APPLICATION(S): at 11:48

## 2025-06-15 RX ADMIN — LACOSAMIDE 100 MILLIGRAM(S): 150 TABLET, FILM COATED ORAL at 05:44

## 2025-06-15 RX ADMIN — Medication 1 APPLICATION(S): at 11:49

## 2025-06-15 RX ADMIN — Medication 1 DROP(S): at 05:43

## 2025-06-15 NOTE — PROGRESS NOTE ADULT - ASSESSMENT
Shirley is a 75 yo F w/ asthma (chronic methypred 8mg PO daily), bronchiectasis, allergic rhinitis, recurrent PNA, SDB ( on CPAP), tracheomalacia s/p tracheoplasty, tracheobronchomalacia, pAfib (Eliquis), colorectal ca s/p colostomy, aortic dissection s/p ascending aorta repair who p/w cough, SOB and hemoptysis.     #SOB: Presented to OP Pulm office on 6/2 and those cultures were growing MRSA  Blood cx here positive for MRSA as well with clearance of blood cultures on 6/11  SOB thought to be 2/2 PNA/COPD exacerbation and pt feels markedly improved after treatment. Remains on RA this AM  Inhalers as per pulm   Steroids being decreased back to home dose     #MRSA bacteremia: Blood cx as noted above. Repeat cultures on 6/11 negative  However, given she has a bioprosthetic valve, a SAFIA indicated to ensure her infection has not seeded her valve  Would order SAFIA, plan for Monday, NPO Sunday night. discussed with primary team     #PVCs: On chronic mexiletine therapy, continue   - Tele monitoring     #Dissection s/p repair: type A dissection s/p bioAVR and Bental in 2022 then severe AS s/p TAVR 9/2023:  - On Labetalol at home, currently on hold   - BPs and HR stable

## 2025-06-15 NOTE — PROGRESS NOTE ADULT - SUBJECTIVE AND OBJECTIVE BOX
[facility] Division of Hospital Medicine  Terrell Felix MD  Available via MS Teams    SUBJECTIVE / OVERNIGHT EVENTS:  seen and examined at bedside     ADDITIONAL REVIEW OF SYSTEMS:    MEDICATIONS  (STANDING):  albuterol    0.083% 2.5 milliGRAM(s) Nebulizer every 6 hours  apixaban 5 milliGRAM(s) Oral two times a day  artificial  tears Solution 1 Drop(s) Both EYES every 4 hours  budesonide    Inhalation Suspension 1 milliGRAM(s) Inhalation two times a day  chlorhexidine 2% Cloths 1 Application(s) Topical daily  dextrose 5%. 1000 milliLiter(s) (50 mL/Hr) IV Continuous <Continuous>  dextrose 5%. 1000 milliLiter(s) (100 mL/Hr) IV Continuous <Continuous>  dextrose 50% Injectable 25 Gram(s) IV Push once  dextrose 50% Injectable 12.5 Gram(s) IV Push once  dextrose 50% Injectable 25 Gram(s) IV Push once  famotidine    Tablet 20 milliGRAM(s) Oral daily  fluticasone propionate/ salmeterol 250-50 MICROgram(s) Diskus 1 Dose(s) Inhalation two times a day  formoterol for nebulization 20 MICROGram(s) Nebulizer every 12 hours  glucagon  Injectable 1 milliGRAM(s) IntraMuscular once  insulin glargine Injectable (LANTUS) 32 Unit(s) SubCutaneous at bedtime  insulin lispro (ADMELOG) corrective regimen sliding scale   SubCutaneous three times a day before meals  insulin lispro Injectable (ADMELOG) 2 Unit(s) SubCutaneous before breakfast  insulin lispro Injectable (ADMELOG) 4 Unit(s) SubCutaneous before dinner  insulin lispro Injectable (ADMELOG) 4 Unit(s) SubCutaneous before lunch  lacosamide 100 milliGRAM(s) Oral two times a day  levETIRAcetam 1000 milliGRAM(s) Oral two times a day  methylPREDNISolone 8 milliGRAM(s) Oral daily  mexiletine 200 milliGRAM(s) Oral three times a day  mineral oil 30 milliLiter(s) Oral daily  montelukast 10 milliGRAM(s) Oral at bedtime  NIFEdipine XL 60 milliGRAM(s) Oral daily  Ohtuvayre 3 mg/2.5 mL Inhalation 3 milliGRAM(s) 3 milliGRAM(s) Nebulizer every 12 hours  pantoprazole    Tablet 40 milliGRAM(s) Oral before breakfast  polyethylene glycol 3350 17 Gram(s) Oral daily  rosuvastatin 5 milliGRAM(s) Oral at bedtime  senna 2 Tablet(s) Oral at bedtime  sertraline 50 milliGRAM(s) Oral daily  silver sulfADIAZINE 1% Cream 1 Application(s) Topical daily  sodium chloride 0.9%. 1000 milliLiter(s) (75 mL/Hr) IV Continuous <Continuous>  sodium chloride 7% Inhalation 4 milliLiter(s) Inhalation every 12 hours  tiotropium 2.5 MICROgram(s) Inhaler 2 Puff(s) Inhalation daily  vancomycin  IVPB 750 milliGRAM(s) IV Intermittent every 12 hours    MEDICATIONS  (PRN):  dextrose Oral Gel 15 Gram(s) Oral once PRN Blood Glucose LESS THAN 70 milliGRAM(s)/deciliter  lactulose Syrup 10 Gram(s) Oral daily PRN constipation  magnesium hydroxide Suspension 30 milliLiter(s) Oral daily PRN Constipation  melatonin 3 milliGRAM(s) Oral at bedtime PRN Insomnia  ondansetron Injectable 4 milliGRAM(s) IV Push every 8 hours PRN Nausea and/or Vomiting      I&O's Summary    14 Jun 2025 07:01  -  15 Christopher 2025 07:00  --------------------------------------------------------  IN: 0 mL / OUT: 1400 mL / NET: -1400 mL        PHYSICAL EXAM:  Vital Signs Last 24 Hrs  T(C): 37 (15 Christopher 2025 05:41), Max: 37 (15 Christopher 2025 05:41)  T(F): 98.6 (15 Christopher 2025 05:41), Max: 98.6 (15 Christopher 2025 05:41)  HR: 67 (15 Christopher 2025 06:48) (67 - 74)  BP: 134/79 (15 Christopher 2025 05:41) (104/66 - 134/79)  BP(mean): --  RR: 18 (15 Christopher 2025 05:41) (18 - 18)  SpO2: 96% (15 Christopher 2025 06:48) (95% - 97%)    Parameters below as of 15 Christopher 2025 05:41  Patient On (Oxygen Delivery Method): room air        GENERAL: NAD, breathing not labored   EYES: conjunctiva and sclera clear  NECK: supple, No JVD  CHEST/LUNG: b/l rhonchi (improved)  HEART: S1 S2 RRR  ABDOMEN: +BS Soft, NT/ND  EXTREMITIES:  2+ DP Pulses, No c/c. no LE edema  NEUROLOGY: AAOx3, no facial droop, moving all extremities       LABS:                        13.4   13.74 )-----------( 251      ( 14 Jun 2025 11:21 )             43.8     06-14    141  |  104  |  17  ----------------------------<  125[H]  4.5   |  22  |  0.61    Ca    8.7      14 Jun 2025 11:21    TPro  5.8[L]  /  Alb  3.2[L]  /  TBili  0.2  /  DBili  x   /  AST  19  /  ALT  27  /  AlkPhos  67  06-13          Urinalysis Basic - ( 14 Jun 2025 11:21 )    Color: x / Appearance: x / SG: x / pH: x  Gluc: 125 mg/dL / Ketone: x  / Bili: x / Urobili: x   Blood: x / Protein: x / Nitrite: x   Leuk Esterase: x / RBC: x / WBC x   Sq Epi: x / Non Sq Epi: x / Bacteria: x        Culture - Blood (collected 13 Jun 2025 10:39)  Source: Blood Blood-Peripheral  Preliminary Report (14 Jun 2025 17:02):    No growth at 24 hours    Culture - Blood (collected 13 Jun 2025 10:34)  Source: Blood Blood-Peripheral  Preliminary Report (14 Jun 2025 17:02):    No growth at 24 hours      SARS-CoV-2: NotDetec (18 Apr 2025 12:25)  SARS-CoV-2: NotDetec (08 Jan 2025 11:50)      RADIOLOGY & ADDITIONAL TESTS:  New Imaging Personally Reviewed Today:  New Electrocardiogram Personally Reviewed Today:  Other Results Reviewed Today:   Prior or Outpatient Records Reviewed Today with Summary:    COORDINATION OF CARE:  Consultant Communication and Details of Discussion (where applicable):

## 2025-06-15 NOTE — PROGRESS NOTE ADULT - TIME BILLING
- Review of records, telemetry, vital signs and daily labs.   - General and cardiovascular physical examination.  - Generation of cardiovascular treatment plan and completion of note .  - Coordination of care-discussed with ACP, and  on disposition plan .      Patient was seen and examined by me on  06/15/2025 ,interim events noted,labs and radiology studies reviewed.  Jaydon Bruce MD,FACC.  [address] [place] [address].  [id number].  [phone]  Availabe to call or text on [employer] TEAMS.

## 2025-06-15 NOTE — PROGRESS NOTE ADULT - SUBJECTIVE AND OBJECTIVE BOX
Follow Up:  MRSA bacteremic pneumonia    Interval History/ROS:  Better!   continued productive cough,  notes subjective improvement    Allergies  Valium (Short breath)  OxyContin (Unknown)  Valium (Unknown)  Avelox (Short breath; Pruritus)  penicillin (Anaphylaxis)  Dilaudid (Short breath)  shellfish (Anaphylaxis)  ampicillin (Unknown)  codeine (Short breath)  Percocet (Unknown)  tetanus toxoid (Short breath)  cefepime (Anaphylaxis)  cefepime (Short breath (Severe))  codeine (Unknown)  iodine (Short breath; Swelling)  aspirin (Short breath)  meropenem (Unknown)    ANTIMICROBIALS:  vancomycin  IVPB 1000 every 12 hours      OTHER MEDS:  MEDICATIONS  (STANDING):  albuterol    0.083% 2.5 every 6 hours  apixaban 5 two times a day  budesonide    Inhalation Suspension 1 two times a day  dextrose 50% Injectable 25 once  dextrose 50% Injectable 12.5 once  dextrose 50% Injectable 25 once  dextrose Oral Gel 15 once PRN  famotidine    Tablet 20 daily  fluticasone propionate/ salmeterol 250-50 MICROgram(s) Diskus 1 two times a day  formoterol for nebulization 20 every 12 hours  glucagon  Injectable 1 once  insulin glargine Injectable (LANTUS) 32 at bedtime  insulin lispro (ADMELOG) corrective regimen sliding scale  three times a day before meals  insulin lispro Injectable (ADMELOG) 2 before breakfast  insulin lispro Injectable (ADMELOG) 4 before dinner  insulin lispro Injectable (ADMELOG) 4 before lunch  lacosamide 100 two times a day  lactulose Syrup 10 daily PRN  levETIRAcetam 1000 two times a day  magnesium hydroxide Suspension 30 daily PRN  melatonin 3 at bedtime PRN  methylPREDNISolone 8 daily  mexiletine 200 three times a day  mineral oil 30 daily  montelukast 10 at bedtime  NIFEdipine XL 60 daily  ondansetron Injectable 4 every 8 hours PRN  pantoprazole    Tablet 40 before breakfast  polyethylene glycol 3350 17 daily  rosuvastatin 5 at bedtime  senna 2 at bedtime  sertraline 50 daily  sodium chloride 7% Inhalation 4 every 12 hours  tiotropium 2.5 MICROgram(s) Inhaler 2 daily      Vital Signs Last 24 Hrs  T(C): 37 (15 Christopher 2025 05:41), Max: 37 (15 Christopher 2025 05:41)  T(F): 98.6 (15 Christopher 2025 05:41), Max: 98.6 (15 Christopher 2025 05:41)  HR: 70 (15 Christopher 2025 09:18) (67 - 74)  BP: 134/79 (15 Christopher 2025 05:41) (104/66 - 134/79)  BP(mean): --  RR: 18 (15 Christopher 2025 05:41) (18 - 18)  SpO2: 97% (15 Christopher 2025 09:18) (96% - 97%)    Parameters below as of 15 Christopher 2025 05:41  Patient On (Oxygen Delivery Method): room air        PHYSICAL EXAM:  General: NAD, Non-toxic  Neurology: A&Ox3, nonfocal  Respiratory: Clear to auscultation bilaterally  CV: RRR, S1S2, no murmurs, rubs or gallops  Abdominal: Soft, Non-tender, non-distended, normal bowel sounds  Extremities: No edema, no splinter hem  Line Sites: Clear left IJ line  Skin: No rash                          12.9   13.05 )-----------( 218      ( 15 Christopher 2025 09:32 )             40.8   WBC Count: 13.05 (06-15 @ 09:32)  WBC Count: 13.74 (06-14 @ 11:21)  WBC Count: 19.12 (06-13 @ 11:05)  WBC Count: 17.81 (06-12 @ 11:20)  WBC Count: 17.63 (06-11 @ 09:50)       06-15    140  |  105  |  22  ----------------------------<  112[H]  3.8   |  22  |  0.64    Ca    8.7      15 Christopher 2025 09:32    Urinalysis + Microscopic Examination (06.09.25 @ 17:56)   pH Urine: 7.0  Urine Appearance: Clear  Color: Yellow  Specific Gravity: 1.015  Protein, Urine: Negative: Chemistry tests performed on Queralt instrument.   (Short sample)  Glucose Qualitative, Urine: Negative  Ketone , Urine: Negative  Blood, Urine: Negative  Bilirubin: Negative  Urobilinogen: <2 mg/dL  Leukocyte Esterase Concentration: Negative  Nitrite: Negative  Review: Reviewed  White Blood Cell - Urine: 1 /HPF  Red Blood Cell - Urine: 2 /HPF  Bacteria: Negative /HPF  Cast: 0 /LPF  Epithelial Cells: 0 /HPF  MICROBIOLOGY:  Blood Blood-Peripheral  06-13-25   No growth at 24 hours  --  --      Blood Blood-Peripheral  06-13-25   No growth at 24 hours  --  --      Blood Blood  06-11-25   No growth at 72 Hours  --  --      Blood Blood-Peripheral  06-11-25   Growth in anaerobic bottle: Methicillin Resistant Staphylococcus aureus  See previous culture [id number]  --    Growth in anaerobic bottle: Gram Positive Cocci in Clusters      Sputum Sputum  06-10-25   Numerous Methicillin Resistant Staphylococcus aureus  Normal Respiratory Jessica absent  --  Methicillin resistant Staphylococcus aureus      Clean Catch Clean Catch (Midstream)  06-09-25   <10,000 CFU/mL Normal Urogenital Jessica  --  --      Sputum Sputum  06-09-25   Moderate Methicillin Resistant Staphylococcus aureus  Commensal jessica consistent with body site  --  Methicillin resistant Staphylococcus aureus      Blood Blood-Peripheral  06-09-25   Growth in anaerobic bottle: Methicillin Resistant Staphylococcus aureus  --  Methicillin resistant Staphylococcus aureus      Blood Blood-Peripheral  06-09-25   Growth in aerobic and anaerobic bottles: Methicillin Resistant  Staphylococcus aureus  See previous culture  [id number] for susceptibility  Direct identification is available within approximately 3-5  hours either by Blood Panel Multiplexed PCR or Direct  MALDI-TOF. Details: [email or web address]  --  Blood Culture PCR    Vancomycin Level, Trough: 10.9 ug/mL (06-15-25 @ 09:32)      RADIOLOGY:  < from: CT Abdomen and Pelvis w/ IV Cont (06.12.25 @ 15:12) >  FINDINGS:  LOWER CHEST: Patchy opacities in the right middle lobe, decreased. Trace   left pleural fluid. TAVR. Sternotomy.    LIVER: Within normal limits.  BILE DUCTS: Normal caliber.  GALLBLADDER: Within normal limits.  SPLEEN: Within normal limits.  PANCREAS: Stablemultiple cystic lesions.  ADRENALS: Within normal limits.  KIDNEYS/URETERS: No hydronephrosis. Indeterminate left renal hypodense   lesions measuring higher than simple fluid attenuation, unchanged. Patchy   cortical hypoenhancement in the bilateral lower poles.    BLADDER: Within normal limits.  REPRODUCTIVE ORGANS: Hysterectomy.    BOWEL: Status post APR and left lower quadrant colostomy. No bowel   obstruction. Appendix is normal.  PERITONEUM/RETROPERITONEUM: Within normal limits.  VESSELS: Chronic aortic dissection.  LYMPH NODES: No lymphadenopathy.  ABDOMINAL WALL: Within normal limits.  BONES: Internal fixation at the right sacroiliac joint and pubic   symphysis. Bilateral femoral head AVN right greater than left.    IMPRESSION:  Patchycortical hypoenhancement in the bilateral renal lower poles,   possibly pyelonephritis.    No abscess.    < end of copied text >  < from: MR Lumbar Spine w/wo IV Cont (06.11.25 @ 10:44) >  IMPRESSION:  1. No evidence of discitis-osteomyelitis, epidural or paraspinal abscess.  2. Stable grade 1 anterolisthesis L3 over L4 with severe bilateral L3-L4   facet arthrosis.  3. L3-L4 anterolisthesis with a stable left foraminal-lateral disc   protrusion superimposed upon a disc bulge, resulting in moderate central   canal and mild left neural foramen stenosis with facet arthrosis and   ligamentum flavum hypertrophy, crowding the nerve roots of the cauda   equina. Left foraminal-lateral annular tear.  4. L4-L5 stable right foraminal-lateral disc protrusion superimposed upon   a disc bulge, resulting in mild central canal, bilateral lateral recess,   moderate right and mild left neural foramen stenosis with facet arthrosis   and ligamentum flavum hypertrophy, contacting the right L4 and L5 nerve   roots.  5. L5-S1 stable disc bulge-spondylitic ridge complex, resulting in mild   central canal and severe bilateral neural foramen stenosis with facet   arthrosis, impinging upon bilateral L5 nerve roots.  6. Additional findings, including those degenerative, described in detail   above.    < end of copied text >  < from: CT Chest No Cont (06.09.25 @ 15:14) >  IMPRESSION:  Multifocal pneumonia. Recommend follow-up chest CT in 3 months to ensure   clearance.    < end of copied text >  < from: TTE W or WO Ultrasound Enhancing Agent (06.11.25 @ 11:42) >  CONCLUSIONS:      1. Left ventricular systolic function is hyperdynamic with an ejection fraction of 81 % by Felipe's method of disks. There are no regional wall motion abnormalities seen.   2. Mild left ventricular hypertrophy.   3. There is moderate (grade 2) left ventricular diastolic dysfunction.   4. Enlarged right ventricular cavity size and mild to moderately reduced right ventricular systolic function. Tricuspid annular plane systolic excursion (TAPSE) is 1.1 cm (normal >=1.7 cm). Tricuspid annular tissue Doppler S' is 10.0 cm/s (normal >10 cm/s).   5. Moderate mitral regurgitation.   6. Estimated pulmonary artery systolic pressure is 46 mmHg.   7. No significant valvular disease.   8. The inferior vena cava is normal in size (normal <2.1cm) with normal inspiratory collapse (normal >50%) consistent with normal right atrial pressure (~3, range 0-5mmHg).   9. Compared to the transthoracic echocardiogram performed on 7/3/2024, there have been no significant interval changes.    < end of copied text >      Steven Combs MD; Division of Infectious Disease; Pager: [phone]; nights and weekends: [phone]

## 2025-06-15 NOTE — PROVIDER CONTACT NOTE (CRITICAL VALUE NOTIFICATION) - SITUATION
Blood culture from 6/9 shows positive blood cultures in growth in anaerobic bottle with gram positive cocci in clusters.
Sputum Culture showing numerous methicillin resistant staphylococcus aureus, normal val absent. Blood cultures taken on June 9th, one set shows growth in anaerobic and aerobic bottle: Methicillin resistant staphylococcus aureus. Gram Positive cocci in clusters. The other set shows growth in anaerobic bottle: Methicillin resistant staphylococcus aureus. Gram Positive cocci in clusters.
Bcx 6/11 - growth in anaerobic bottle MRSA

## 2025-06-15 NOTE — PROVIDER CONTACT NOTE (CRITICAL VALUE NOTIFICATION) - ACTION/TREATMENT ORDERED:
ACP Lakshmi Bobo aware. No further interventions at this time as per ACP. Pt on IV abx. Plan of care ongoing.
Provider aware. Pt on antibiotics and ID aware. Pt care continues.
ACP Nitin Smith notified. Initiated patient isolation (contact) precautions; pt to continue with IVPB vancomycin antibiotic. Pt safety maintained.

## 2025-06-15 NOTE — PROVIDER CONTACT NOTE (CRITICAL VALUE NOTIFICATION) - BACKGROUND
73 yo female admitted for sepsis.
(Admit Diagnosis) Cough; Hypertension; Sepsis; COPD with asthma.
Sepsis/SIRS 2/2 MF PNA- on vanco and invanz per ID; on room air;   	MRSA Bacteremia: BCx 6/9 +MRSA; dsaily CHG bath.

## 2025-06-15 NOTE — PROGRESS NOTE ADULT - PROBLEM SELECTOR PLAN 1
--Met SIRS with leukocytosis and tachpnyea (RR 22) on initial presentation. Present on admission.   --Multifocal Pneumonia on Imaging.   --Bacteremia- MRSA 3/4 bottles, repeat 6/11 1/4+GPC in clusters, repeat bx q48 hours     TTE without endocarditis - SAFIA ordered   monitor repeat blood cultures > NGTD f/u with final results   c/w vancomycin - monitor trough  ID and pulmonology following  Patient with many antibiotic allergies

## 2025-06-15 NOTE — PROVIDER CONTACT NOTE (CRITICAL VALUE NOTIFICATION) - TEST AND RESULT REPORTED:
Bcx 6/11 - growth in anaerobic bottle MRSA
Sputum Culture showing numerous methicillin resistant staphylococcus aureus, normal val absent. Blood cultures taken on June 9th, one set shows growth in anaerobic and aerobic bottle: Methicillin resistant staphylococcus aureus. Gram Positive cocci in clusters. The other set shows growth in anaerobic bottle: Methicillin resistant staphylococcus aureus. Gram Positive cocci in clusters.
Blood culture from 6/9 positive

## 2025-06-15 NOTE — PROGRESS NOTE ADULT - ASSESSMENT
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 Patient reporting worsening cough and SOB over last 2-3 days. Patient recently dx with MRSA on sputum culture, started treatment on Bactrim. Reports she recently started Bactrim ds 1 BID x 10 days (6/5/2025)    Afebrile here in the ED. WBC on admission elevated to 25K. CXR on admission showing Right patchy airspace opacity may reflect pneumonia. CT chest non contrast - Multifocal pneumonia. Found to have MRSA bacteremia. Has a TAVR in place (severe AS s/p TAVR (9/10/2023)),  Has Bilateral TKA ( 20 years ago), plate and screws across pubic symphysis.     06/11 MRI lumbar spine-  No evidence of discitis-osteomyelitis, epidural or paraspinal abscess.  TTE on 06/11- TAVR, There is trace paravalvular regurgitation. Moderate MR. Compared to the transthoracic echocardiogram performed on 7/3/2024, there have been no significant interval changes.  CTAP 06/12- Patchy cortical hypoenhancement in the bilateral renal lower poles, possibly pyelonephritis. UA on admission was negative, No abscess.  Pending SAFIA,      # High grade MRSA bacteremia ( left leg open wound likely source vs PNA)  # Multifocal PNA  # Recent MRSA PNA  # Left leg open wound   # S/P TAVR in 2023   # S/P internal fixation of remote fractures of the superior pubic rami and pubic symphysis  # Bilateral TKA - 20 years ago   # Leucocytosis     decreased  # Immunocompromised host   # severe persistent asthma-steroid dependent  # bronchiectasis/COPD-   # H/o multiple infections   # Chronic cough   # H/O multiple antibiotic allergies   # Elevated inflammatory markers        6/10: ESR/CRP = 31/136     6/13 ESR/CRP = 40/17    MICRO:   06/09 Bcx- MRSA ( 3/4) ( S- GARRETT: 1- Vancomycin)   06/09 Sputum cx-  MRSA  06/09 Urine cx- Negative   06/09 Urine strep and urine legionella- Negative   06/11 Bcx- 1/4 MRSA  6/13 BC x2 NGTD    06/02/25- Sputum cx- MRSA S- doxy, S- bactrim, S- linezolid, S- vancomycin     ABX;   Vancomycin 06/09-- current     Recommendations:   - TTE on 06/11- TAVR, There is trace paravalvular regurgitation. Moderate MR.   - get Bcx X 2 on 06/13  - INCREASE  Vancomycin 1000 mg Q12H.  (I ordered)    6 week duration of antibiotics anticipated

## 2025-06-15 NOTE — PROGRESS NOTE ADULT - SUBJECTIVE AND OBJECTIVE BOX
MR#[id number]  PATIENT NAME:DARIEL RODRIGUEZ    DATE OF SERVICE: 06-15-25 @ 07:36  Patient was seen and examined by Jaydon Bruce MD on    06-15-25 @ 07:36 .  Interim events noted.Consultant notes ,Labs,Telemetry reviewed by me       HOSPITAL COURSE: HPI:  73 yo F with a PMH of Epilepsy on Keppra/lacosamide, COPD, asthma  (on CPAP at home, on chronic steroids), DM2, bronchiectasis, tracheomalacia s/p tracheloplasty, HTN, Hx of dissection of descending thoracic aorta, hx of aortic aneurysm, Type B dissection (7/2024), medically managed initially as she did not meet criteria for surgery at that time), evaluated by Dr. 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     History obtained from daughter, Zoe, as patient not responding to questions, those eyes open spontaneously to voice and light touch.   She reports that patient intermittently confused since most recent hospitalization.  She noted worsening productive cough. Also with N/V- non-bloody, non-bilious. No fevers at home. She was recently started on Bactrim by her outpatient pulmonologist, Dr. Allison, for MRSA in sputum.    Per report, patient had ?hemoptysis during CT chest, however, not collected.  (09 Jun 2025 15:21)      INTERIM EVENTS:Patient seen at bedside ,interim events noted.  Sputum and blood cx positive for MRSA  Repeat 6/11 cleared  Awake no dyspnea-    PMH -reviewed admission note, no change since admission    AMBULATION: Assisted[ ] Cane/walker[ ] Independent[ ]    MEDICATIONS  (STANDING):  albuterol    0.083% 2.5 milliGRAM(s) Nebulizer every 6 hours  apixaban 5 milliGRAM(s) Oral two times a day  artificial  tears Solution 1 Drop(s) Both EYES every 4 hours  budesonide    Inhalation Suspension 1 milliGRAM(s) Inhalation two times a day  chlorhexidine 2% Cloths 1 Application(s) Topical daily  famotidine    Tablet 20 milliGRAM(s) Oral daily  fluticasone propionate/ salmeterol 250-50 MICROgram(s) Diskus 1 Dose(s) Inhalation two times a day  formoterol for nebulization 20 MICROGram(s) Nebulizer every 12 hours  glucagon  Injectable 1 milliGRAM(s) IntraMuscular once  insulin glargine Injectable (LANTUS) 32 Unit(s) SubCutaneous at bedtime  insulin lispro (ADMELOG) corrective regimen sliding scale   SubCutaneous three times a day before meals  insulin lispro Injectable (ADMELOG) 2 Unit(s) SubCutaneous before breakfast  insulin lispro Injectable (ADMELOG) 4 Unit(s) SubCutaneous before lunch  insulin lispro Injectable (ADMELOG) 4 Unit(s) SubCutaneous before dinner  lacosamide 100 milliGRAM(s) Oral two times a day  levETIRAcetam 1000 milliGRAM(s) Oral two times a day  methylPREDNISolone 8 milliGRAM(s) Oral daily  mexiletine 200 milliGRAM(s) Oral three times a day  mineral oil 30 milliLiter(s) Oral daily  montelukast 10 milliGRAM(s) Oral at bedtime  NIFEdipine XL 60 milliGRAM(s) Oral daily  Ohtuvayre 3 mg/2.5 mL Inhalation 3 milliGRAM(s) 3 milliGRAM(s) Nebulizer every 12 hours  pantoprazole    Tablet 40 milliGRAM(s) Oral before breakfast  polyethylene glycol 3350 17 Gram(s) Oral daily  rosuvastatin 5 milliGRAM(s) Oral at bedtime  senna 2 Tablet(s) Oral at bedtime  sertraline 50 milliGRAM(s) Oral daily  silver sulfADIAZINE 1% Cream 1 Application(s) Topical daily  sodium chloride 0.9%. 1000 milliLiter(s) (75 mL/Hr) IV Continuous <Continuous>  sodium chloride 7% Inhalation 4 milliLiter(s) Inhalation every 12 hours  tiotropium 2.5 MICROgram(s) Inhaler 2 Puff(s) Inhalation daily  vancomycin  IVPB 750 milliGRAM(s) IV Intermittent every 12 hours    MEDICATIONS  (PRN):  dextrose Oral Gel 15 Gram(s) Oral once PRN Blood Glucose LESS THAN 70 milliGRAM(s)/deciliter  lactulose Syrup 10 Gram(s) Oral daily PRN constipation  magnesium hydroxide Suspension 30 milliLiter(s) Oral daily PRN Constipation  melatonin 3 milliGRAM(s) Oral at bedtime PRN Insomnia  ondansetron Injectable 4 milliGRAM(s) IV Push every 8 hours PRN Nausea and/or Vomiting            REVIEW OF SYSTEMS:  Constitutional: [ ] fever, [ ]weight loss,  [x ]fatigue [ ]weight gain  Eyes: [ ] visual changes  Respiratory: [ ]shortness of breath;  [ ] cough, [ ]wheezing, [ ]chills, [ ]hemoptysis  Cardiovascular: [ ] chest pain, [ ]palpitations, [ ]dizziness,  [ ]leg swelling[ ]orthopnea[ ]PND  Gastrointestinal: [ ] abdominal pain, [ ]nausea, [ ]vomiting,  [ ]diarrhea [ ]Constipation [ ]Melena  Genitourinary: [ ] dysuria, [ ] hematuria [ ][place]  Neurologic: [ ] headaches [ ] tremors[ ]weakness [ ]Paralysis Right[ ] Left[ ]  Skin: [ ] itching, [ ]burning, [ ] rashes  Endocrine: [ ] heat or cold intolerance  Musculoskeletal: [ ] joint pain or swelling; [ ] muscle, back, or extremity pain  Psychiatric: [ ] depression, [ ]anxiety, [ ]mood swings, or [ ]difficulty sleeping  Hematologic: [ ] easy bruising, [ ] bleeding gums    [ ] All remaining systems negative except as per above.   [ ]Unable to obtain.  [x] No change in ROS since admission      Vital Signs Last 24 Hrs  T(C): 37 (15 Christopher 2025 05:41), Max: 37 (15 Christopher 2025 05:41)  T(F): 98.6 (15 Christopher 2025 05:41), Max: 98.6 (15 Christopher 2025 05:41)  HR: 67 (15 Christopher 2025 06:48) (67 - 74)  BP: 134/79 (15 Christopher 2025 05:41) (104/66 - 134/79)  RR: 18 (15 Christopher 2025 05:41) (18 - 18)  SpO2: 96% (15 Christopher 2025 06:48) (95% - 97%)    Parameters below as of 15 Christopher 2025 05:41  Patient On (Oxygen Delivery Method): room air      I&O's Summary    14 Jun 2025 07:01  -  15 Christopher 2025 07:00  --------------------------------------------------------  IN: 0 mL / OUT: 1400 mL / NET: -1400 mL        PHYSICAL EXAM:  General: No acute distress BMI-25  HEENT: EOMI, PERRL  Neck: Supple, [ ] JVD  Lungs: Equal air entry bilaterally; [ ] rales [ ] wheezing [ ] rhonchi  Heart: Regular rate and rhythm; [x ] murmur   2/6 [ x] systolic [ ] diastolic [ ] radiation[ ] rubs [ ]  gallops  Abdomen: Nontender, bowel sounds present  Extremities: No clubbing, cyanosis, [ ] edema [ ]Pulses  equal and intact  Nervous system:  Alert & Oriented X3, no focal deficits  Psychiatric: Normal affect  Skin: No rashes or lesions    LABS:  06-14    141  |  104  |  17  ----------------------------<  125[H]  4.5   |  22  |  0.61    Ca    8.7      14 Jun 2025 11:21    TPro  5.8[L]  /  Alb  3.2[L]  /  TBili  0.2  /  DBili  x   /  AST  19  /  ALT  27  /  AlkPhos  67  06-13    Creatinine Trend: 0.61<--, 0.59<--, 0.64<--, 0.59<--, 0.70<--, 0.90<--                        13.4   13.74 )-----------( 251      ( 14 Jun 2025 11:21 )             43.8         TTE 6/11/25:  CONCLUSIONS:   1. Left ventricular systolic function is hyperdynamic with an ejection fraction of 81 % by Felipe's method of disks. There are no regional wall motion abnormalities seen.   2. Mild left ventricular hypertrophy.   3. There is moderate (grade 2) left ventricular diastolic dysfunction.   4. Enlarged right ventricular cavity size and mild to moderately reduced right ventricular systolic function. Tricuspid annular plane systolic excursion (TAPSE) is 1.1 cm (normal >=1.7 cm). Tricuspid annular tissue Doppler S' is 10.0 cm/s (normal >10 cm/s).   5. Moderate mitral regurgitation.   6. Estimated pulmonary artery systolic pressure is 46 mmHg.   7. No significant valvular disease.   8. The inferior vena cava is normal in size (normal <2.1cm) with normal inspiratory collapse (normal >50%) consistent with normal right atrial pressure (~3, range 0-5mmHg).   9. Compared to the transthoracic echocardiogram performed on 7/3/2024, there have been no significant interval changes.        12 Lead ECG (06.09.25 @ 05:06) >  WIDE QRS RHYTHM AF  RIGHT BUNDLE BRANCH BLOCK  LEFT ANTERIOR FASCICULAR BLOCK  *** BIFASCICULAR BLOCK ***  ABNORMAL ECG        CT Abdomen and Pelvis w/ IV Cont (06.12.25 @ 15:12) >  IMPRESSION:  Patchycortical hypoenhancement in the bilateral renal lower poles,  possibly pyelonephritis.      CT Chest No Cont (06.09.25 @ 15:14) >  IMPRESSION:  Multifocal pneumonia. Recommend follow-up chest CT in 3 months to ensure  clearance.

## 2025-06-16 DIAGNOSIS — Z51.5 ENCOUNTER FOR PALLIATIVE CARE: ICD-10-CM

## 2025-06-16 DIAGNOSIS — R41.0 DISORIENTATION, UNSPECIFIED: ICD-10-CM

## 2025-06-16 DIAGNOSIS — R06.02 SHORTNESS OF BREATH: ICD-10-CM

## 2025-06-16 DIAGNOSIS — R53.81 OTHER MALAISE: ICD-10-CM

## 2025-06-16 DIAGNOSIS — Z71.89 OTHER SPECIFIED COUNSELING: ICD-10-CM

## 2025-06-16 LAB
ANION GAP SERPL CALC-SCNC: 13 MMOL/L — SIGNIFICANT CHANGE UP (ref 5–17)
BLD GP AB SCN SERPL QL: NEGATIVE — SIGNIFICANT CHANGE UP
BUN SERPL-MCNC: 17 MG/DL — SIGNIFICANT CHANGE UP (ref 7–23)
CALCIUM SERPL-MCNC: 9.1 MG/DL — SIGNIFICANT CHANGE UP (ref 8.4–10.5)
CHLORIDE SERPL-SCNC: 103 MMOL/L — SIGNIFICANT CHANGE UP (ref 96–108)
CO2 SERPL-SCNC: 23 MMOL/L — SIGNIFICANT CHANGE UP (ref 22–31)
CREAT SERPL-MCNC: 0.7 MG/DL — SIGNIFICANT CHANGE UP (ref 0.5–1.3)
CRP SERPL-MCNC: 14 MG/L — HIGH (ref 0–4)
CULTURE RESULTS: SIGNIFICANT CHANGE UP
EGFR: 90 ML/MIN/1.73M2 — SIGNIFICANT CHANGE UP
EGFR: 90 ML/MIN/1.73M2 — SIGNIFICANT CHANGE UP
ERYTHROCYTE [SEDIMENTATION RATE] IN BLOOD: 47 MM/HR — HIGH (ref 0–20)
GLUCOSE BLDC GLUCOMTR-MCNC: 150 MG/DL — HIGH (ref 70–99)
GLUCOSE BLDC GLUCOMTR-MCNC: 161 MG/DL — HIGH (ref 70–99)
GLUCOSE BLDC GLUCOMTR-MCNC: 162 MG/DL — HIGH (ref 70–99)
GLUCOSE BLDC GLUCOMTR-MCNC: 172 MG/DL — HIGH (ref 70–99)
GLUCOSE BLDC GLUCOMTR-MCNC: 181 MG/DL — HIGH (ref 70–99)
GLUCOSE BLDC GLUCOMTR-MCNC: 185 MG/DL — HIGH (ref 70–99)
GLUCOSE SERPL-MCNC: 166 MG/DL — HIGH (ref 70–99)
HCT VFR BLD CALC: 44.1 % — SIGNIFICANT CHANGE UP (ref 34.5–45)
HGB BLD-MCNC: 13.6 G/DL — SIGNIFICANT CHANGE UP (ref 11.5–15.5)
MCHC RBC-ENTMCNC: 23.9 PG — LOW (ref 27–34)
MCHC RBC-ENTMCNC: 30.8 G/DL — LOW (ref 32–36)
MCV RBC AUTO: 77.4 FL — LOW (ref 80–100)
NRBC BLD AUTO-RTO: 0 /100 WBCS — SIGNIFICANT CHANGE UP (ref 0–0)
PLATELET # BLD AUTO: 250 K/UL — SIGNIFICANT CHANGE UP (ref 150–400)
POTASSIUM SERPL-MCNC: 3.9 MMOL/L — SIGNIFICANT CHANGE UP (ref 3.5–5.3)
POTASSIUM SERPL-SCNC: 3.9 MMOL/L — SIGNIFICANT CHANGE UP (ref 3.5–5.3)
RBC # BLD: 5.7 M/UL — HIGH (ref 3.8–5.2)
RBC # FLD: 17.1 % — HIGH (ref 10.3–14.5)
RH IG SCN BLD-IMP: POSITIVE — SIGNIFICANT CHANGE UP
SODIUM SERPL-SCNC: 139 MMOL/L — SIGNIFICANT CHANGE UP (ref 135–145)
SPECIMEN SOURCE: SIGNIFICANT CHANGE UP
VANCOMYCIN TROUGH SERPL-MCNC: 11.1 UG/ML — SIGNIFICANT CHANGE UP (ref 10–20)
WBC # BLD: 9.18 K/UL — SIGNIFICANT CHANGE UP (ref 3.8–10.5)
WBC # FLD AUTO: 9.18 K/UL — SIGNIFICANT CHANGE UP (ref 3.8–10.5)

## 2025-06-16 PROCEDURE — 99233 SBSQ HOSP IP/OBS HIGH 50: CPT

## 2025-06-16 PROCEDURE — 99232 SBSQ HOSP IP/OBS MODERATE 35: CPT

## 2025-06-16 PROCEDURE — 99223 1ST HOSP IP/OBS HIGH 75: CPT | Mod: FS,25

## 2025-06-16 PROCEDURE — G0545: CPT

## 2025-06-16 PROCEDURE — 31575 DIAGNOSTIC LARYNGOSCOPY: CPT

## 2025-06-16 RX ORDER — VANCOMYCIN HCL IN 5 % DEXTROSE 1.5G/250ML
750 PLASTIC BAG, INJECTION (ML) INTRAVENOUS EVERY 12 HOURS
Refills: 0 | Status: DISCONTINUED | OUTPATIENT
Start: 2025-06-16 | End: 2025-06-21

## 2025-06-16 RX ORDER — INSULIN GLARGINE-YFGN 100 [IU]/ML
10 INJECTION, SOLUTION SUBCUTANEOUS ONCE
Refills: 0 | Status: COMPLETED | OUTPATIENT
Start: 2025-06-16 | End: 2025-06-16

## 2025-06-16 RX ADMIN — Medication 2 TABLET(S): at 21:27

## 2025-06-16 RX ADMIN — FORMOTEROL FUMARATE DIHYDRATE 20 MICROGRAM(S): 20 SOLUTION RESPIRATORY (INHALATION) at 21:27

## 2025-06-16 RX ADMIN — Medication 30 MILLILITER(S): at 13:13

## 2025-06-16 RX ADMIN — INSULIN GLARGINE-YFGN 10 UNIT(S): 100 INJECTION, SOLUTION SUBCUTANEOUS at 22:49

## 2025-06-16 RX ADMIN — Medication 250 MILLIGRAM(S): at 17:50

## 2025-06-16 RX ADMIN — METHYLPREDNISOLONE ACETATE 8 MILLIGRAM(S): 80 INJECTION, SUSPENSION INTRA-ARTICULAR; INTRALESIONAL; INTRAMUSCULAR; SOFT TISSUE at 05:57

## 2025-06-16 RX ADMIN — MEXILETINE HYDROCHLORIDE 200 MILLIGRAM(S): 250 CAPSULE ORAL at 13:14

## 2025-06-16 RX ADMIN — TIOTROPIUM BROMIDE INHALATION SPRAY 2 PUFF(S): 3.12 SPRAY, METERED RESPIRATORY (INHALATION) at 11:58

## 2025-06-16 RX ADMIN — MEXILETINE HYDROCHLORIDE 200 MILLIGRAM(S): 250 CAPSULE ORAL at 21:28

## 2025-06-16 RX ADMIN — Medication 1 DROP(S): at 21:24

## 2025-06-16 RX ADMIN — Medication 1 DROP(S): at 17:50

## 2025-06-16 RX ADMIN — MEXILETINE HYDROCHLORIDE 200 MILLIGRAM(S): 250 CAPSULE ORAL at 05:58

## 2025-06-16 RX ADMIN — ROSUVASTATIN CALCIUM 5 MILLIGRAM(S): 20 TABLET, FILM COATED ORAL at 21:27

## 2025-06-16 RX ADMIN — POLYETHYLENE GLYCOL 3350 17 GRAM(S): 17 POWDER, FOR SOLUTION ORAL at 11:59

## 2025-06-16 RX ADMIN — LEVETIRACETAM 1000 MILLIGRAM(S): 10 INJECTION, SOLUTION INTRAVENOUS at 05:58

## 2025-06-16 RX ADMIN — Medication 4 MILLILITER(S): at 21:25

## 2025-06-16 RX ADMIN — LEVETIRACETAM 1000 MILLIGRAM(S): 10 INJECTION, SOLUTION INTRAVENOUS at 17:40

## 2025-06-16 RX ADMIN — Medication 20 MILLIGRAM(S): at 11:58

## 2025-06-16 RX ADMIN — Medication 1 DROP(S): at 13:22

## 2025-06-16 RX ADMIN — Medication 1 DOSE(S): at 17:40

## 2025-06-16 RX ADMIN — Medication 2.5 MILLIGRAM(S): at 21:24

## 2025-06-16 RX ADMIN — LACOSAMIDE 100 MILLIGRAM(S): 150 TABLET, FILM COATED ORAL at 17:39

## 2025-06-16 RX ADMIN — INSULIN LISPRO 1: 100 INJECTION, SOLUTION INTRAVENOUS; SUBCUTANEOUS at 06:13

## 2025-06-16 RX ADMIN — INSULIN LISPRO 1: 100 INJECTION, SOLUTION INTRAVENOUS; SUBCUTANEOUS at 23:54

## 2025-06-16 RX ADMIN — Medication 1 APPLICATION(S): at 11:58

## 2025-06-16 RX ADMIN — Medication 60 MILLIGRAM(S): at 05:58

## 2025-06-16 RX ADMIN — Medication 40 MILLIGRAM(S): at 05:58

## 2025-06-16 RX ADMIN — MONTELUKAST SODIUM 10 MILLIGRAM(S): 10 TABLET ORAL at 21:28

## 2025-06-16 RX ADMIN — Medication 250 MILLIGRAM(S): at 05:57

## 2025-06-16 RX ADMIN — INSULIN LISPRO 2: 100 INJECTION, SOLUTION INTRAVENOUS; SUBCUTANEOUS at 00:22

## 2025-06-16 RX ADMIN — Medication 1 APPLICATION(S): at 11:59

## 2025-06-16 RX ADMIN — BUDESONIDE 1 MILLIGRAM(S): 0.25 SUSPENSION RESPIRATORY (INHALATION) at 21:24

## 2025-06-16 RX ADMIN — APIXABAN 5 MILLIGRAM(S): 2.5 TABLET, FILM COATED ORAL at 17:40

## 2025-06-16 RX ADMIN — INSULIN LISPRO 1: 100 INJECTION, SOLUTION INTRAVENOUS; SUBCUTANEOUS at 11:38

## 2025-06-16 RX ADMIN — SERTRALINE 50 MILLIGRAM(S): 100 TABLET, FILM COATED ORAL at 11:57

## 2025-06-16 RX ADMIN — Medication 1 DOSE(S): at 05:59

## 2025-06-16 RX ADMIN — INSULIN LISPRO 4 UNIT(S): 100 INJECTION, SOLUTION INTRAVENOUS; SUBCUTANEOUS at 11:38

## 2025-06-16 RX ADMIN — INSULIN LISPRO 4 UNIT(S): 100 INJECTION, SOLUTION INTRAVENOUS; SUBCUTANEOUS at 17:35

## 2025-06-16 RX ADMIN — LACOSAMIDE 100 MILLIGRAM(S): 150 TABLET, FILM COATED ORAL at 05:57

## 2025-06-16 RX ADMIN — APIXABAN 5 MILLIGRAM(S): 2.5 TABLET, FILM COATED ORAL at 05:58

## 2025-06-16 RX ADMIN — Medication 1 DROP(S): at 05:58

## 2025-06-16 RX ADMIN — Medication 2.5 MILLIGRAM(S): at 11:57

## 2025-06-16 NOTE — CONSULT NOTE ADULT - ASSESSMENT
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 ENT was called for upper airway eval prior to SAFIA. Pt reports some difficulty swallowing solid foods. Pt admits to occasional hoarseness. Bedside flexible laryngoscopy was unremarkable, airway widely patent, no upper airway obstruction, unable to visualize subglottic lesion due to pt's coughing. There is contraindication to SAFIA from an ENT standpoint.  ENT consulted for upper airway eval prior to SAFIA.    73 yo F with a PMH of Epilepsy on Keppra/lacosamide, COPD, asthma  (on CPAP at home, on chronic steroids), DM2, bronchiectasis, tracheomalacia s/p tracheloplasty, HTN, Hx of dissection of descending thoracic aorta, hx of aortic aneurysm, Type B dissection (7/2024), medically managed initially as she did not meet criteria for surgery at that time), evaluated by Dr. 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  Pt reports some difficulty swallowing solid foods. Pt admits to occasional hoarseness.     Flexible laryngoscopy shows Vocal fold mobile and symmetrical. Airway patent, no upper airway obstruction, unable to visualize subglottic region due to pt's coughing.

## 2025-06-16 NOTE — CONSULT NOTE ADULT - SUBJECTIVE AND OBJECTIVE BOX
CC: Upper airway eval prior to SAFIA    HPI: 73 yo F with a PMH of Epilepsy on Keppra/lacosamide, COPD, asthma  (on CPAP at home, on chronic steroids), DM2, bronchiectasis, tracheomalacia s/p tracheloplasty, HTN, Hx of dissection of descending thoracic aorta, hx of aortic aneurysm, Type B dissection (7/2024), medically managed initially as she did not meet criteria for surgery at that time), evaluated by Dr. 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 ENT was called for upper airway eval prior to SAFIA. Pt reports some difficulty swallowing solid foods. Pt admits to occasional hoarseness, but denies sore throat, fevers.          PAST MEDICAL & SURGICAL HISTORY:  Seizure  x 1 1/7/18      DVT (deep venous thrombosis)  15-20 years ago, took coumadin      TIA (transient ischemic attack)  multiple, last 5 years ago - presents as right-sided weakness      COPD (chronic obstructive pulmonary disease)      Atrial fibrillation      History of COPD      Afib      Aortic valve replaced      Epilepsy      Asthma      DM (diabetes mellitus)      History of seizure disorder      History of TIAs      HTN (hypertension)      Bronchiectasis      Colon cancer      History of partial hysterectomy  30 years ago - fibroids      H/O total knee replacement, bilateral  5 years ago      History of sinus surgery  multiple sinus surgeries      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      H/O pelvic surgery  5 years ago - s/p fracture      History of tracheomalacia  2015 - attempted tracheal stenting ([facility])- course complicated by obstruction, respiratory failure, multiple CPR attempts -  stent discontinued; 10/20/2016 Tracheobronchoplasty (Prolene Mesh) performed at [facility] by Dr Zapien      S/P bronchoscopy  6/5/2018 - [place] (Dr Zapien) no evidence of tracheobronchomalacia in trachea or bronchial tubes      Rectal bleeding  exam under anesthesia ([facility]) 2/2018      S/P TAVR (transcatheter aortic valve replacement)      S/P aortic valve replacement      S/P colostomy      S/P AVR (aortic valve replacement)        Allergies    Valium (Short breath)  OxyContin (Unknown)  Valium (Unknown)  Avelox (Short breath; Pruritus)  penicillin (Anaphylaxis)  Dilaudid (Short breath)  shellfish (Anaphylaxis)  ampicillin (Unknown)  codeine (Short breath)  Percocet (Unknown)  tetanus toxoid (Short breath)  cefepime (Anaphylaxis)  cefepime (Short breath (Severe))  codeine (Unknown)  iodine (Short breath; Swelling)  aspirin (Short breath)  meropenem (Unknown)    Intolerances      MEDICATIONS  (STANDING):  albuterol    0.083% 2.5 milliGRAM(s) Nebulizer every 6 hours  apixaban 5 milliGRAM(s) Oral two times a day  artificial  tears Solution 1 Drop(s) Both EYES every 4 hours  budesonide    Inhalation Suspension 1 milliGRAM(s) Inhalation two times a day  chlorhexidine 2% Cloths 1 Application(s) Topical daily  dextrose 5%. 1000 milliLiter(s) (50 mL/Hr) IV Continuous <Continuous>  dextrose 5%. 1000 milliLiter(s) (100 mL/Hr) IV Continuous <Continuous>  dextrose 50% Injectable 25 Gram(s) IV Push once  dextrose 50% Injectable 12.5 Gram(s) IV Push once  dextrose 50% Injectable 25 Gram(s) IV Push once  famotidine    Tablet 20 milliGRAM(s) Oral daily  fluticasone propionate/ salmeterol 250-50 MICROgram(s) Diskus 1 Dose(s) Inhalation two times a day  formoterol for nebulization 20 MICROGram(s) Nebulizer every 12 hours  glucagon  Injectable 1 milliGRAM(s) IntraMuscular once  insulin glargine Injectable (LANTUS) 20 Unit(s) SubCutaneous at bedtime  insulin lispro (ADMELOG) corrective regimen sliding scale   SubCutaneous every 6 hours  insulin lispro Injectable (ADMELOG) 2 Unit(s) SubCutaneous before breakfast  insulin lispro Injectable (ADMELOG) 4 Unit(s) SubCutaneous before lunch  insulin lispro Injectable (ADMELOG) 4 Unit(s) SubCutaneous before dinner  lacosamide 100 milliGRAM(s) Oral two times a day  levETIRAcetam 1000 milliGRAM(s) Oral two times a day  methylPREDNISolone 8 milliGRAM(s) Oral daily  mexiletine 200 milliGRAM(s) Oral three times a day  mineral oil 30 milliLiter(s) Oral daily  montelukast 10 milliGRAM(s) Oral at bedtime  NIFEdipine XL 60 milliGRAM(s) Oral daily  Ohtuvayre 3 mg/2.5 mL Inhalation 3 milliGRAM(s) 3 milliGRAM(s) Nebulizer every 12 hours  pantoprazole    Tablet 40 milliGRAM(s) Oral before breakfast  polyethylene glycol 3350 17 Gram(s) Oral daily  rosuvastatin 5 milliGRAM(s) Oral at bedtime  senna 2 Tablet(s) Oral at bedtime  sertraline 50 milliGRAM(s) Oral daily  silver sulfADIAZINE 1% Cream 1 Application(s) Topical daily  sodium chloride 0.9%. 1000 milliLiter(s) (75 mL/Hr) IV Continuous <Continuous>  sodium chloride 7% Inhalation 4 milliLiter(s) Inhalation every 12 hours  tiotropium 2.5 MICROgram(s) Inhaler 2 Puff(s) Inhalation daily  vancomycin  IVPB 750 milliGRAM(s) IV Intermittent every 12 hours    MEDICATIONS  (PRN):  dextrose Oral Gel 15 Gram(s) Oral once PRN Blood Glucose LESS THAN 70 milliGRAM(s)/deciliter  lactulose Syrup 10 Gram(s) Oral daily PRN constipation  magnesium hydroxide Suspension 30 milliLiter(s) Oral daily PRN Constipation  melatonin 3 milliGRAM(s) Oral at bedtime PRN Insomnia  ondansetron Injectable 4 milliGRAM(s) IV Push every 8 hours PRN Nausea and/or Vomiting      Social History: no tobacco use    Family history: no pertinent Fhx    ROS:   ENT: all negative except as noted in HPI   CV: denies palpitations  Pulm: denies SOB, cough, hemoptysis  GI: denies change in apetite, indigestion, n/v  : denies pertinent urinary symptoms, urgency  Neuro: denies numbness/tingling, loss of sensation  Psych: denies anxiety  MS: denies muscle weakness, instability  Heme: denies easy bruising or bleeding  Endo: denies heat/cold intolerance, excessive sweating  Vascular: denies LE edema    Vital Signs Last 24 Hrs  T(C): 37.1 (16 Jun 2025 11:53), Max: 37.1 (15 Christopher 2025 20:01)  T(F): 98.7 (16 Jun 2025 11:53), Max: 98.8 (15 Christopher 2025 20:01)  HR: 65 (16 Jun 2025 11:53) (65 - 78)  BP: 101/66 (16 Jun 2025 11:53) (101/66 - 119/72)  BP(mean): --  RR: 18 (16 Jun 2025 11:53) (18 - 18)  SpO2: 100% (16 Jun 2025 11:53) (95% - 100%)    Parameters below as of 16 Jun 2025 11:53  Patient On (Oxygen Delivery Method): room air                              13.6   9.18  )-----------( 250      ( 16 Jun 2025 06:44 )             44.1    06-16    139  |  103  |  17  ----------------------------<  166[H]  3.9   |  23  |  0.70    Ca    9.1      16 Jun 2025 06:44         PHYSICAL EXAM:  Gen: NAD  Skin: No rashes, bruises, or lesions  Head: Normocephalic, Atraumatic  Face: no edema, erythema, or fluctuance. Parotid glands soft without mass  Eyes: no scleral injection  Nose: Nares bilaterally patent, no discharge  Mouth: No Stridor / Drooling / Trismus.  Mucosa moist, tongue/uvula midline, oropharynx clear  Neck: Flat, supple, no lymphadenopathy, trachea midline, no masses  Lymphatic: No lymphadenopathy  Resp: breathing easily, no stridor  CV: no peripheral edema/cyanosis  GI: nondistended   Peripheral vascular: no JVD or edema  Neuro: facial nerve intact, no facial droop      Fiberoptic Indirect laryngoscopy:  (Scope #2 used)    Reason for Laryngoscopy:    Patient was unable to cooperate with mirror.  Interarytenoid edema, Nasopharynx, oropharynx, and hypopharynx clear, no bleeding. Tongue base, posterior pharyngeal wall, vallecula, epiglottis, and subglottis appear normal. No erythema, edema, pooling of secretions, masses or lesions. Airway patent, no foreign body visualized. No glottic/supraglottic edema. True vocal cords, arytenoids, vestibular folds, ventricles, pyriform sinuses, and aryepiglottic folds appear normal bilaterally. Vocal cords mobile with good contact b/l.       CC: Upper airway eval prior to SAFIA    HPI: 75 yo F with a PMH of Epilepsy on Keppra/lacosamide, COPD, asthma  (on CPAP at home, on chronic steroids), DM2, bronchiectasis, tracheomalacia s/p tracheloplasty, HTN, Hx of dissection of descending thoracic aorta, hx of aortic aneurysm, Type B dissection (7/2024), medically managed initially as she did not meet criteria for surgery at that time), evaluated by Dr. 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 ENT was called for upper airway eval prior to SAFIA. Pt reports some difficulty swallowing solid foods. Pt admits to occasional hoarseness, but denies sore throat, fevers.          PAST MEDICAL & SURGICAL HISTORY:  Seizure  x 1 1/7/18      DVT (deep venous thrombosis)  15-20 years ago, took coumadin      TIA (transient ischemic attack)  multiple, last 5 years ago - presents as right-sided weakness      COPD (chronic obstructive pulmonary disease)      Atrial fibrillation      History of COPD      Afib      Aortic valve replaced      Epilepsy      Asthma      DM (diabetes mellitus)      History of seizure disorder      History of TIAs      HTN (hypertension)      Bronchiectasis      Colon cancer      History of partial hysterectomy  30 years ago - fibroids      H/O total knee replacement, bilateral  5 years ago      History of sinus surgery  multiple sinus surgeries      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      H/O pelvic surgery  5 years ago - s/p fracture      History of tracheomalacia  2015 - attempted tracheal stenting ([facility])- course complicated by obstruction, respiratory failure, multiple CPR attempts -  stent discontinued; 10/20/2016 Tracheobronchoplasty (Prolene Mesh) performed at [facility] by Dr Zapien      S/P bronchoscopy  6/5/2018 - [place] (Dr Zapien) no evidence of tracheobronchomalacia in trachea or bronchial tubes      Rectal bleeding  exam under anesthesia ([facility]) 2/2018      S/P TAVR (transcatheter aortic valve replacement)      S/P aortic valve replacement      S/P colostomy      S/P AVR (aortic valve replacement)        Allergies    Valium (Short breath)  OxyContin (Unknown)  Valium (Unknown)  Avelox (Short breath; Pruritus)  penicillin (Anaphylaxis)  Dilaudid (Short breath)  shellfish (Anaphylaxis)  ampicillin (Unknown)  codeine (Short breath)  Percocet (Unknown)  tetanus toxoid (Short breath)  cefepime (Anaphylaxis)  cefepime (Short breath (Severe))  codeine (Unknown)  iodine (Short breath; Swelling)  aspirin (Short breath)  meropenem (Unknown)    Intolerances      MEDICATIONS  (STANDING):  albuterol    0.083% 2.5 milliGRAM(s) Nebulizer every 6 hours  apixaban 5 milliGRAM(s) Oral two times a day  artificial  tears Solution 1 Drop(s) Both EYES every 4 hours  budesonide    Inhalation Suspension 1 milliGRAM(s) Inhalation two times a day  chlorhexidine 2% Cloths 1 Application(s) Topical daily  dextrose 5%. 1000 milliLiter(s) (50 mL/Hr) IV Continuous <Continuous>  dextrose 5%. 1000 milliLiter(s) (100 mL/Hr) IV Continuous <Continuous>  dextrose 50% Injectable 25 Gram(s) IV Push once  dextrose 50% Injectable 12.5 Gram(s) IV Push once  dextrose 50% Injectable 25 Gram(s) IV Push once  famotidine    Tablet 20 milliGRAM(s) Oral daily  fluticasone propionate/ salmeterol 250-50 MICROgram(s) Diskus 1 Dose(s) Inhalation two times a day  formoterol for nebulization 20 MICROGram(s) Nebulizer every 12 hours  glucagon  Injectable 1 milliGRAM(s) IntraMuscular once  insulin glargine Injectable (LANTUS) 20 Unit(s) SubCutaneous at bedtime  insulin lispro (ADMELOG) corrective regimen sliding scale   SubCutaneous every 6 hours  insulin lispro Injectable (ADMELOG) 2 Unit(s) SubCutaneous before breakfast  insulin lispro Injectable (ADMELOG) 4 Unit(s) SubCutaneous before lunch  insulin lispro Injectable (ADMELOG) 4 Unit(s) SubCutaneous before dinner  lacosamide 100 milliGRAM(s) Oral two times a day  levETIRAcetam 1000 milliGRAM(s) Oral two times a day  methylPREDNISolone 8 milliGRAM(s) Oral daily  mexiletine 200 milliGRAM(s) Oral three times a day  mineral oil 30 milliLiter(s) Oral daily  montelukast 10 milliGRAM(s) Oral at bedtime  NIFEdipine XL 60 milliGRAM(s) Oral daily  Ohtuvayre 3 mg/2.5 mL Inhalation 3 milliGRAM(s) 3 milliGRAM(s) Nebulizer every 12 hours  pantoprazole    Tablet 40 milliGRAM(s) Oral before breakfast  polyethylene glycol 3350 17 Gram(s) Oral daily  rosuvastatin 5 milliGRAM(s) Oral at bedtime  senna 2 Tablet(s) Oral at bedtime  sertraline 50 milliGRAM(s) Oral daily  silver sulfADIAZINE 1% Cream 1 Application(s) Topical daily  sodium chloride 0.9%. 1000 milliLiter(s) (75 mL/Hr) IV Continuous <Continuous>  sodium chloride 7% Inhalation 4 milliLiter(s) Inhalation every 12 hours  tiotropium 2.5 MICROgram(s) Inhaler 2 Puff(s) Inhalation daily  vancomycin  IVPB 750 milliGRAM(s) IV Intermittent every 12 hours    MEDICATIONS  (PRN):  dextrose Oral Gel 15 Gram(s) Oral once PRN Blood Glucose LESS THAN 70 milliGRAM(s)/deciliter  lactulose Syrup 10 Gram(s) Oral daily PRN constipation  magnesium hydroxide Suspension 30 milliLiter(s) Oral daily PRN Constipation  melatonin 3 milliGRAM(s) Oral at bedtime PRN Insomnia  ondansetron Injectable 4 milliGRAM(s) IV Push every 8 hours PRN Nausea and/or Vomiting      Social History: no tobacco use    Family history: no pertinent Fhx    ROS:   ENT: all negative except as noted in HPI   CV: denies palpitations  Pulm: denies SOB, cough, hemoptysis  GI: denies change in apetite, indigestion, n/v  : denies pertinent urinary symptoms, urgency  Neuro: denies numbness/tingling, loss of sensation  Psych: denies anxiety  MS: denies muscle weakness, instability  Heme: denies easy bruising or bleeding  Endo: denies heat/cold intolerance, excessive sweating  Vascular: denies LE edema    Vital Signs Last 24 Hrs  T(C): 37.1 (16 Jun 2025 11:53), Max: 37.1 (15 Christopher 2025 20:01)  T(F): 98.7 (16 Jun 2025 11:53), Max: 98.8 (15 Christopher 2025 20:01)  HR: 65 (16 Jun 2025 11:53) (65 - 78)  BP: 101/66 (16 Jun 2025 11:53) (101/66 - 119/72)  BP(mean): --  RR: 18 (16 Jun 2025 11:53) (18 - 18)  SpO2: 100% (16 Jun 2025 11:53) (95% - 100%)    Parameters below as of 16 Jun 2025 11:53  Patient On (Oxygen Delivery Method): room air                              13.6   9.18  )-----------( 250      ( 16 Jun 2025 06:44 )             44.1    06-16    139  |  103  |  17  ----------------------------<  166[H]  3.9   |  23  |  0.70    Ca    9.1      16 Jun 2025 06:44         PHYSICAL EXAM:  Gen: NAD  Skin: No rashes, bruises, or lesions  Head: Normocephalic, Atraumatic  Face: no edema, erythema, or fluctuance. Parotid glands soft without mass  Eyes: no scleral injection  Nose: Nares bilaterally patent, no discharge  Mouth: No Stridor / Drooling / Trismus.  Mucosa moist, tongue/uvula midline, oropharynx clear  Neck: Flat, supple, no lymphadenopathy, trachea midline, no masses  Lymphatic: No lymphadenopathy  Resp: breathing easily, no stridor  CV: no peripheral edema/cyanosis  GI: nondistended   Peripheral vascular: no JVD or edema  Neuro: facial nerve intact, no facial droop      Fiberoptic Indirect laryngoscopy:  (Scope #2 used)    Reason for Laryngoscopy:    Patient was unable to cooperate with mirror.  Interarytenoid edema, Nasopharynx, oropharynx, and hypopharynx clear, no bleeding. Tongue base, posterior pharyngeal wall, vallecula, epiglottis appear normal. No erythema, edema, pooling of secretions, masses or lesions. Airway patent, no foreign body visualized. No glottic/supraglottic edema. True vocal cords, arytenoids, vestibular folds, ventricles, pyriform sinuses, and aryepiglottic folds appear normal bilaterally. Vocal cords mobile with good contact b/l.

## 2025-06-16 NOTE — PROGRESS NOTE ADULT - SUBJECTIVE AND OBJECTIVE BOX
CHIEF COMPLAINT: sob    Interval Events: Patient seen and examined at bedside. No acute events overnight. Patient reports she feels much better today. Denies hemoptysis.     REVIEW OF SYSTEMS:  Constitutional: [ X] negative [ ] fevers [ ] chills [ ] weight loss [ ] weight gain  HEENT: [X ] negative [ ] dry eyes [ ] eye irritation [ ] postnasal drip [ ] nasal congestion  CV: [ X] negative  [ ] chest pain [ ] orthopnea [ ] palpitations [ ] murmur  Resp: [X ] negative [ ] cough [ ] shortness of breath [ ] dyspnea [ ] wheezing [ ] sputum [ ] hemoptysis  GI: [ x] negative [ ] nausea [ ] vomiting [ ] diarrhea [ ] constipation [ ] abd pain [ ] dysphagia   : [X ] negative [ ] dysuria [ ] nocturia [ ] hematuria [ ] increased urinary frequency  Musculoskeletal: [ ] negative [ ] back pain [ ] myalgias [ ] arthralgias [ ] fracture  Skin: [ ] negative [ ] rash [ ] itch  Neurological: [ ] negative [ ] headache [ ] dizziness [ ] syncope [ ] weakness [ ] numbness  Psychiatric: [ ] negative [ ] anxiety [ ] depression  Endocrine: [ ] negative [ ] diabetes [ ] thyroid problem  Hematologic/Lymphatic: [ ] negative [ ] anemia [ ] bleeding problem  Allergic/Immunologic: [ ] negative [ ] itchy eyes [ ] nasal discharge [ ] hives [ ] angioedema  [ ] All other systems negative  [ ] Unable to assess ROS because ________    OBJECTIVE:  ICU Vital Signs Last 24 Hrs  T(C): 36.7 (16 Jun 2025 05:53), Max: 37.1 (15 Christopher 2025 20:01)  T(F): 98.1 (16 Jun 2025 05:53), Max: 98.8 (15 Christopher 2025 20:01)  HR: 70 (16 Jun 2025 05:53) (67 - 80)  BP: 119/72 (16 Jun 2025 05:53) (115/66 - 121/81)  BP(mean): --  ABP: --  ABP(mean): --  RR: 18 (16 Jun 2025 05:53) (18 - 18)  SpO2: 97% (16 Jun 2025 05:53) (95% - 98%)    O2 Parameters below as of 16 Jun 2025 05:53  Patient On (Oxygen Delivery Method): room air              06-15 @ 07:01  -  06-16 @ 07:00  --------------------------------------------------------  IN: 650 mL / OUT: 500 mL / NET: 150 mL      CAPILLARY BLOOD GLUCOSE      POCT Blood Glucose.: 162 mg/dL (16 Jun 2025 06:05)      PHYSICAL EXAM:  General: well appearing female, NAD   Neck: Supple, NO JVD  Cardiac: RRR,  S1/ S2, No murmurs, rubs, gallops  Pulmonary: CTABL, Breathing unlabored, No Rhonchi/Rales/Wheezing  Abdomen: Soft, non tender, non distended, +BS  Extremities: No Rashes, No pedal edema, no TTP on calfs  Neuro: A/o x 3, No focal deficits  Psch: normal mood , normal affect    HOSPITAL MEDICATIONS:  apixaban 5 milliGRAM(s) Oral two times a day    vancomycin  IVPB 1000 milliGRAM(s) IV Intermittent every 12 hours    mexiletine 200 milliGRAM(s) Oral three times a day  NIFEdipine XL 60 milliGRAM(s) Oral daily    dextrose 50% Injectable 25 Gram(s) IV Push once  dextrose 50% Injectable 12.5 Gram(s) IV Push once  dextrose 50% Injectable 25 Gram(s) IV Push once  dextrose Oral Gel 15 Gram(s) Oral once PRN  glucagon  Injectable 1 milliGRAM(s) IntraMuscular once  insulin glargine Injectable (LANTUS) 20 Unit(s) SubCutaneous at bedtime  insulin lispro (ADMELOG) corrective regimen sliding scale   SubCutaneous every 6 hours  insulin lispro Injectable (ADMELOG) 2 Unit(s) SubCutaneous before breakfast  insulin lispro Injectable (ADMELOG) 4 Unit(s) SubCutaneous before lunch  insulin lispro Injectable (ADMELOG) 4 Unit(s) SubCutaneous before dinner  methylPREDNISolone 8 milliGRAM(s) Oral daily  rosuvastatin 5 milliGRAM(s) Oral at bedtime    albuterol    0.083% 2.5 milliGRAM(s) Nebulizer every 6 hours  budesonide    Inhalation Suspension 1 milliGRAM(s) Inhalation two times a day  fluticasone propionate/ salmeterol 250-50 MICROgram(s) Diskus 1 Dose(s) Inhalation two times a day  formoterol for nebulization 20 MICROGram(s) Nebulizer every 12 hours  montelukast 10 milliGRAM(s) Oral at bedtime  sodium chloride 7% Inhalation 4 milliLiter(s) Inhalation every 12 hours  tiotropium 2.5 MICROgram(s) Inhaler 2 Puff(s) Inhalation daily    lacosamide 100 milliGRAM(s) Oral two times a day  levETIRAcetam 1000 milliGRAM(s) Oral two times a day  melatonin 3 milliGRAM(s) Oral at bedtime PRN  ondansetron Injectable 4 milliGRAM(s) IV Push every 8 hours PRN  sertraline 50 milliGRAM(s) Oral daily    famotidine    Tablet 20 milliGRAM(s) Oral daily  lactulose Syrup 10 Gram(s) Oral daily PRN  magnesium hydroxide Suspension 30 milliLiter(s) Oral daily PRN  mineral oil 30 milliLiter(s) Oral daily  pantoprazole    Tablet 40 milliGRAM(s) Oral before breakfast  polyethylene glycol 3350 17 Gram(s) Oral daily  senna 2 Tablet(s) Oral at bedtime        dextrose 5%. 1000 milliLiter(s) IV Continuous <Continuous>  dextrose 5%. 1000 milliLiter(s) IV Continuous <Continuous>  sodium chloride 0.9%. 1000 milliLiter(s) IV Continuous <Continuous>      artificial  tears Solution 1 Drop(s) Both EYES every 4 hours  chlorhexidine 2% Cloths 1 Application(s) Topical daily  silver sulfADIAZINE 1% Cream 1 Application(s) Topical daily    Ohtuvayre 3 mg/2.5 mL Inhalation 3 milliGRAM(s) 3 milliGRAM(s) Nebulizer every 12 hours      LABS:                        13.6   9.18  )-----------( 250      ( 16 Jun 2025 06:44 )             44.1     Hgb Trend: 13.6<--, 12.9<--, 13.4<--, 12.5<--, 11.9<--  06-16    139  |  103  |  17  ----------------------------<  166[H]  3.9   |  23  |  0.70    Ca    9.1      16 Jun 2025 06:44      Creatinine Trend: 0.70<--, 0.64<--, 0.61<--, 0.59<--, 0.64<--, 0.59<--    Urinalysis Basic - ( 16 Jun 2025 06:44 )    Color: x / Appearance: x / SG: x / pH: x  Gluc: 166 mg/dL / Ketone: x  / Bili: x / Urobili: x   Blood: x / Protein: x / Nitrite: x   Leuk Esterase: x / RBC: x / WBC x   Sq Epi: x / Non Sq Epi: x / Bacteria: x            MICROBIOLOGY:     RADIOLOGY:  [ ] Reviewed and interpreted by me    PULMONARY FUNCTION TESTS:    EKG:

## 2025-06-16 NOTE — PROGRESS NOTE ADULT - SUBJECTIVE AND OBJECTIVE BOX
[facility] Division of Hospital Medicine  Kacie Meek MD  MS Teams PREFERRED        SUBJECTIVE / OVERNIGHT EVENTS:  ADDITIONAL REVIEW OF SYSTEMS:    MEDICATIONS  (STANDING):  albuterol    0.083% 2.5 milliGRAM(s) Nebulizer every 6 hours  apixaban 5 milliGRAM(s) Oral two times a day  artificial  tears Solution 1 Drop(s) Both EYES every 4 hours  budesonide    Inhalation Suspension 1 milliGRAM(s) Inhalation two times a day  chlorhexidine 2% Cloths 1 Application(s) Topical daily  dextrose 5%. 1000 milliLiter(s) (50 mL/Hr) IV Continuous <Continuous>  dextrose 5%. 1000 milliLiter(s) (100 mL/Hr) IV Continuous <Continuous>  dextrose 50% Injectable 25 Gram(s) IV Push once  dextrose 50% Injectable 12.5 Gram(s) IV Push once  dextrose 50% Injectable 25 Gram(s) IV Push once  famotidine    Tablet 20 milliGRAM(s) Oral daily  fluticasone propionate/ salmeterol 250-50 MICROgram(s) Diskus 1 Dose(s) Inhalation two times a day  formoterol for nebulization 20 MICROGram(s) Nebulizer every 12 hours  glucagon  Injectable 1 milliGRAM(s) IntraMuscular once  insulin glargine Injectable (LANTUS) 20 Unit(s) SubCutaneous at bedtime  insulin lispro (ADMELOG) corrective regimen sliding scale   SubCutaneous every 6 hours  insulin lispro Injectable (ADMELOG) 2 Unit(s) SubCutaneous before breakfast  insulin lispro Injectable (ADMELOG) 4 Unit(s) SubCutaneous before lunch  insulin lispro Injectable (ADMELOG) 4 Unit(s) SubCutaneous before dinner  lacosamide 100 milliGRAM(s) Oral two times a day  levETIRAcetam 1000 milliGRAM(s) Oral two times a day  methylPREDNISolone 8 milliGRAM(s) Oral daily  mexiletine 200 milliGRAM(s) Oral three times a day  mineral oil 30 milliLiter(s) Oral daily  montelukast 10 milliGRAM(s) Oral at bedtime  NIFEdipine XL 60 milliGRAM(s) Oral daily  Ohtuvayre 3 mg/2.5 mL Inhalation 3 milliGRAM(s) 3 milliGRAM(s) Nebulizer every 12 hours  pantoprazole    Tablet 40 milliGRAM(s) Oral before breakfast  polyethylene glycol 3350 17 Gram(s) Oral daily  rosuvastatin 5 milliGRAM(s) Oral at bedtime  senna 2 Tablet(s) Oral at bedtime  sertraline 50 milliGRAM(s) Oral daily  silver sulfADIAZINE 1% Cream 1 Application(s) Topical daily  sodium chloride 0.9%. 1000 milliLiter(s) (75 mL/Hr) IV Continuous <Continuous>  sodium chloride 7% Inhalation 4 milliLiter(s) Inhalation every 12 hours  tiotropium 2.5 MICROgram(s) Inhaler 2 Puff(s) Inhalation daily  vancomycin  IVPB 750 milliGRAM(s) IV Intermittent every 12 hours    MEDICATIONS  (PRN):  dextrose Oral Gel 15 Gram(s) Oral once PRN Blood Glucose LESS THAN 70 milliGRAM(s)/deciliter  lactulose Syrup 10 Gram(s) Oral daily PRN constipation  magnesium hydroxide Suspension 30 milliLiter(s) Oral daily PRN Constipation  melatonin 3 milliGRAM(s) Oral at bedtime PRN Insomnia  ondansetron Injectable 4 milliGRAM(s) IV Push every 8 hours PRN Nausea and/or Vomiting      I&O's Summary    15 Christopher 2025 07:01  -  16 Jun 2025 07:00  --------------------------------------------------------  IN: 650 mL / OUT: 500 mL / NET: 150 mL        PHYSICAL EXAM:  Vital Signs Last 24 Hrs  T(C): 37.1 (16 Jun 2025 11:53), Max: 37.1 (15 Christopher 2025 20:01)  T(F): 98.7 (16 Jun 2025 11:53), Max: 98.8 (15 Christopher 2025 20:01)  HR: 65 (16 Jun 2025 11:53) (65 - 78)  BP: 101/66 (16 Jun 2025 11:53) (101/66 - 119/72)  BP(mean): --  RR: 18 (16 Jun 2025 11:53) (18 - 18)  SpO2: 100% (16 Jun 2025 11:53) (95% - 100%)    Parameters below as of 16 Jun 2025 11:53  Patient On (Oxygen Delivery Method): room air      CONSTITUTIONAL: NAD, well-developed, well-groomed  EYES: PERRLA; conjunctiva and sclera clear  ENMT: Moist oral mucosa, no pharyngeal injection or exudates; normal dentition  NECK: Supple, no palpable masses; no thyromegaly  RESPIRATORY: Normal respiratory effort; lungs are clear to auscultation bilaterally  CARDIOVASCULAR: Regular rate and rhythm, normal S1 and S2, no murmur/rub/gallop; No lower extremity edema; Peripheral pulses are 2+ bilaterally  ABDOMEN: Nontender to palpation, normoactive bowel sounds, no rebound/guarding; No hepatosplenomegaly  MUSCULOSKELETAL:  Normal gait; no clubbing or cyanosis of digits; no joint swelling or tenderness to palpation  PSYCH: A+O to person, place, and time; affect appropriate  NEUROLOGY: CN 2-12 are intact and symmetric; no gross sensory deficits   SKIN: No rashes; no palpable lesions    LABS:                        13.6   9.18  )-----------( 250      ( 16 Jun 2025 06:44 )             44.1     06-16    139  |  103  |  17  ----------------------------<  166[H]  3.9   |  23  |  0.70    Ca    9.1      16 Jun 2025 06:44            Urinalysis Basic - ( 16 Jun 2025 06:44 )    Color: x / Appearance: x / SG: x / pH: x  Gluc: 166 mg/dL / Ketone: x  / Bili: x / Urobili: x   Blood: x / Protein: x / Nitrite: x   Leuk Esterase: x / RBC: x / WBC x   Sq Epi: x / Non Sq Epi: x / Bacteria: x          RADIOLOGY & ADDITIONAL TESTS:  Results Reviewed:   Imaging Personally Reviewed:  Electrocardiogram Personally Reviewed:    COORDINATION OF CARE:  Care Discussed with Consultants/Other Providers [Y/N]:  Prior or Outpatient Records Reviewed [Y/N]:   [facility] Division of Hospital Medicine  Kacie Meek MD  MS Teams PREFERRED        SUBJECTIVE / OVERNIGHT EVENTS: Seen at bedside. She appears well. NAD.     MEDICATIONS  (STANDING):  albuterol    0.083% 2.5 milliGRAM(s) Nebulizer every 6 hours  apixaban 5 milliGRAM(s) Oral two times a day  artificial  tears Solution 1 Drop(s) Both EYES every 4 hours  budesonide    Inhalation Suspension 1 milliGRAM(s) Inhalation two times a day  chlorhexidine 2% Cloths 1 Application(s) Topical daily  dextrose 5%. 1000 milliLiter(s) (50 mL/Hr) IV Continuous <Continuous>  dextrose 5%. 1000 milliLiter(s) (100 mL/Hr) IV Continuous <Continuous>  dextrose 50% Injectable 25 Gram(s) IV Push once  dextrose 50% Injectable 12.5 Gram(s) IV Push once  dextrose 50% Injectable 25 Gram(s) IV Push once  famotidine    Tablet 20 milliGRAM(s) Oral daily  fluticasone propionate/ salmeterol 250-50 MICROgram(s) Diskus 1 Dose(s) Inhalation two times a day  formoterol for nebulization 20 MICROGram(s) Nebulizer every 12 hours  glucagon  Injectable 1 milliGRAM(s) IntraMuscular once  insulin glargine Injectable (LANTUS) 20 Unit(s) SubCutaneous at bedtime  insulin lispro (ADMELOG) corrective regimen sliding scale   SubCutaneous every 6 hours  insulin lispro Injectable (ADMELOG) 2 Unit(s) SubCutaneous before breakfast  insulin lispro Injectable (ADMELOG) 4 Unit(s) SubCutaneous before lunch  insulin lispro Injectable (ADMELOG) 4 Unit(s) SubCutaneous before dinner  lacosamide 100 milliGRAM(s) Oral two times a day  levETIRAcetam 1000 milliGRAM(s) Oral two times a day  methylPREDNISolone 8 milliGRAM(s) Oral daily  mexiletine 200 milliGRAM(s) Oral three times a day  mineral oil 30 milliLiter(s) Oral daily  montelukast 10 milliGRAM(s) Oral at bedtime  NIFEdipine XL 60 milliGRAM(s) Oral daily  Ohtuvayre 3 mg/2.5 mL Inhalation 3 milliGRAM(s) 3 milliGRAM(s) Nebulizer every 12 hours  pantoprazole    Tablet 40 milliGRAM(s) Oral before breakfast  polyethylene glycol 3350 17 Gram(s) Oral daily  rosuvastatin 5 milliGRAM(s) Oral at bedtime  senna 2 Tablet(s) Oral at bedtime  sertraline 50 milliGRAM(s) Oral daily  silver sulfADIAZINE 1% Cream 1 Application(s) Topical daily  sodium chloride 0.9%. 1000 milliLiter(s) (75 mL/Hr) IV Continuous <Continuous>  sodium chloride 7% Inhalation 4 milliLiter(s) Inhalation every 12 hours  tiotropium 2.5 MICROgram(s) Inhaler 2 Puff(s) Inhalation daily  vancomycin  IVPB 750 milliGRAM(s) IV Intermittent every 12 hours    MEDICATIONS  (PRN):  dextrose Oral Gel 15 Gram(s) Oral once PRN Blood Glucose LESS THAN 70 milliGRAM(s)/deciliter  lactulose Syrup 10 Gram(s) Oral daily PRN constipation  magnesium hydroxide Suspension 30 milliLiter(s) Oral daily PRN Constipation  melatonin 3 milliGRAM(s) Oral at bedtime PRN Insomnia  ondansetron Injectable 4 milliGRAM(s) IV Push every 8 hours PRN Nausea and/or Vomiting      I&O's Summary    15 Christopher 2025 07:01  -  16 Jun 2025 07:00  --------------------------------------------------------  IN: 650 mL / OUT: 500 mL / NET: 150 mL        PHYSICAL EXAM:  Vital Signs Last 24 Hrs  T(C): 37.1 (16 Jun 2025 11:53), Max: 37.1 (15 Christopher 2025 20:01)  T(F): 98.7 (16 Jun 2025 11:53), Max: 98.8 (15 Christopher 2025 20:01)  HR: 65 (16 Jun 2025 11:53) (65 - 78)  BP: 101/66 (16 Jun 2025 11:53) (101/66 - 119/72)  BP(mean): --  RR: 18 (16 Jun 2025 11:53) (18 - 18)  SpO2: 100% (16 Jun 2025 11:53) (95% - 100%)    Parameters below as of 16 Jun 2025 11:53  Patient On (Oxygen Delivery Method): room air    GENERAL: NAD, breathing not labored   EYES: conjunctiva and sclera clear  NECK: supple, No JVD  CHEST/LUNG: b/l rhonchi (improved)  HEART: S1 S2 RRR  ABDOMEN: +BS Soft, NT/ND  EXTREMITIES:  2+ DP Pulses, No c/c. no LE edema  NEUROLOGY: AAOx3, no facial droop, moving all extremities   LABS:                        13.6   9.18  )-----------( 250      ( 16 Jun 2025 06:44 )             44.1     06-16    139  |  103  |  17  ----------------------------<  166[H]  3.9   |  23  |  0.70    Ca    9.1      16 Jun 2025 06:44            Urinalysis Basic - ( 16 Jun 2025 06:44 )    Color: x / Appearance: x / SG: x / pH: x  Gluc: 166 mg/dL / Ketone: x  / Bili: x / Urobili: x   Blood: x / Protein: x / Nitrite: x   Leuk Esterase: x / RBC: x / WBC x   Sq Epi: x / Non Sq Epi: x / Bacteria: x

## 2025-06-16 NOTE — PROGRESS NOTE ADULT - PROBLEM SELECTOR PLAN 7
bowel regimen   daughter gives her mineral oil, senna, and milk of magnesia daily   with lactulose and miralax prn if constipatedT bowel regimen   daughter gives her mineral oil, senna, and milk of magnesia daily   with lactulose and miralax prn if constipatedT      d/w pt and daughter in detail

## 2025-06-16 NOTE — CONSULT NOTE ADULT - NS ATTEND AMEND GEN_ALL_CORE FT
ENT consulted for upper airway eval prior to SAFIA.    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  Pt reports some difficulty swallowing solid foods. Pt admits to occasional hoarseness.     Flexible laryngoscopy shows Vocal fold mobile and symmetrical. Airway patent, no upper airway obstruction, unable to visualize subglottic region due to pt's coughing.     Recommend:  Airway evaluation   -No absolute contraindication to SAFIA from an ENT standpoint.   -Follow up with with private ENT as outpt  -Follow up with Pulmonology for any lower airway concerns

## 2025-06-16 NOTE — PROGRESS NOTE ADULT - SUBJECTIVE AND OBJECTIVE BOX
[facility] - eL Jung, Jim Pablo, Sridhar Newsome  Office Number:  [phone]    Patient resting comfortably in bed in NAD.  Laying flat with no respiratory distress.  No complaints of chest pain, dyspnea, palpitations, PND, or orthopnea.  On RA this AM  States she feels markedly improved since admission    ROS: negative unless otherwise mentioned.    Telemetry: SR     MEDICATIONS  (STANDING):  albuterol    0.083% 2.5 milliGRAM(s) Nebulizer every 6 hours  apixaban 5 milliGRAM(s) Oral two times a day  artificial  tears Solution 1 Drop(s) Both EYES every 4 hours  budesonide    Inhalation Suspension 1 milliGRAM(s) Inhalation two times a day  chlorhexidine 2% Cloths 1 Application(s) Topical daily  dextrose 5%. 1000 milliLiter(s) (50 mL/Hr) IV Continuous <Continuous>  dextrose 5%. 1000 milliLiter(s) (100 mL/Hr) IV Continuous <Continuous>  dextrose 50% Injectable 25 Gram(s) IV Push once  dextrose 50% Injectable 12.5 Gram(s) IV Push once  dextrose 50% Injectable 25 Gram(s) IV Push once  famotidine    Tablet 20 milliGRAM(s) Oral daily  fluticasone propionate/ salmeterol 250-50 MICROgram(s) Diskus 1 Dose(s) Inhalation two times a day  formoterol for nebulization 20 MICROGram(s) Nebulizer every 12 hours  glucagon  Injectable 1 milliGRAM(s) IntraMuscular once  insulin glargine Injectable (LANTUS) 20 Unit(s) SubCutaneous at bedtime  insulin lispro (ADMELOG) corrective regimen sliding scale   SubCutaneous every 6 hours  insulin lispro Injectable (ADMELOG) 2 Unit(s) SubCutaneous before breakfast  insulin lispro Injectable (ADMELOG) 4 Unit(s) SubCutaneous before lunch  insulin lispro Injectable (ADMELOG) 4 Unit(s) SubCutaneous before dinner  lacosamide 100 milliGRAM(s) Oral two times a day  levETIRAcetam 1000 milliGRAM(s) Oral two times a day  methylPREDNISolone 8 milliGRAM(s) Oral daily  mexiletine 200 milliGRAM(s) Oral three times a day  mineral oil 30 milliLiter(s) Oral daily  montelukast 10 milliGRAM(s) Oral at bedtime  NIFEdipine XL 60 milliGRAM(s) Oral daily  Ohtuvayre 3 mg/2.5 mL Inhalation 3 milliGRAM(s) 3 milliGRAM(s) Nebulizer every 12 hours  pantoprazole    Tablet 40 milliGRAM(s) Oral before breakfast  polyethylene glycol 3350 17 Gram(s) Oral daily  rosuvastatin 5 milliGRAM(s) Oral at bedtime  senna 2 Tablet(s) Oral at bedtime  sertraline 50 milliGRAM(s) Oral daily  silver sulfADIAZINE 1% Cream 1 Application(s) Topical daily  sodium chloride 0.9%. 1000 milliLiter(s) (75 mL/Hr) IV Continuous <Continuous>  sodium chloride 7% Inhalation 4 milliLiter(s) Inhalation every 12 hours  tiotropium 2.5 MICROgram(s) Inhaler 2 Puff(s) Inhalation daily  vancomycin  IVPB 1000 milliGRAM(s) IV Intermittent every 12 hours    MEDICATIONS  (PRN):  dextrose Oral Gel 15 Gram(s) Oral once PRN Blood Glucose LESS THAN 70 milliGRAM(s)/deciliter  lactulose Syrup 10 Gram(s) Oral daily PRN constipation  magnesium hydroxide Suspension 30 milliLiter(s) Oral daily PRN Constipation  melatonin 3 milliGRAM(s) Oral at bedtime PRN Insomnia  ondansetron Injectable 4 milliGRAM(s) IV Push every 8 hours PRN Nausea and/or Vomiting      Allergies    Valium (Short breath)  OxyContin (Unknown)  Valium (Unknown)  Avelox (Short breath; Pruritus)  penicillin (Anaphylaxis)  Dilaudid (Short breath)  shellfish (Anaphylaxis)  ampicillin (Unknown)  codeine (Short breath)  Percocet (Unknown)  tetanus toxoid (Short breath)  cefepime (Anaphylaxis)  cefepime (Short breath (Severe))  codeine (Unknown)  iodine (Short breath; Swelling)  aspirin (Short breath)  meropenem (Unknown)    Intolerances        Vital Signs Last 24 Hrs  T(C): 36.7 (16 Jun 2025 05:53), Max: 37.1 (15 Christopher 2025 20:01)  T(F): 98.1 (16 Jun 2025 05:53), Max: 98.8 (15 Christopher 2025 20:01)  HR: 70 (16 Jun 2025 05:53) (67 - 80)  BP: 119/72 (16 Jun 2025 05:53) (115/66 - 121/81)  BP(mean): --  RR: 18 (16 Jun 2025 05:53) (18 - 18)  SpO2: 97% (16 Jun 2025 05:53) (95% - 98%)    Parameters below as of 16 Jun 2025 05:53  Patient On (Oxygen Delivery Method): room air        I&O's Summary    15 Christopher 2025 07:01  -  16 Jun 2025 07:00  --------------------------------------------------------  IN: 650 mL / OUT: 500 mL / NET: 150 mL        ON EXAM:    General: NAD, awake and alert, oriented x 3  HEENT: Mucous membranes are moist, anicteric  Lungs: Non-labored, breath sounds are clear bilaterally, No wheezing, rales or rhonchi  Cardiovascular: Regular, S1 and S2, no murmurs, rubs, or gallops  Gastrointestinal: Bowel Sounds present, soft, nontender.   Lymph: No peripheral edema. No lymphadenopathy.  Skin: No rashes or ulcers  Psych:  Mood & affect appropriate    LABS: All Labs Reviewed:                        13.6   9.18  )-----------( 250      ( 16 Jun 2025 06:44 )             44.1                         12.9   13.05 )-----------( 218      ( 15 Christopher 2025 09:32 )             40.8                         13.4   13.74 )-----------( 251      ( 14 Jun 2025 11:21 )             43.8     16 Jun 2025 06:44    139    |  103    |  17     ----------------------------<  166    3.9     |  23     |  0.70   15 Christopher 2025 09:32    140    |  105    |  22     ----------------------------<  112    3.8     |  22     |  0.64   14 Jun 2025 11:21    141    |  104    |  17     ----------------------------<  125    4.5     |  22     |  0.61     Ca    9.1        16 Jun 2025 06:44  Ca    8.7        15 Jun 2025 09:32  Ca    8.7        14 Jun 2025 11:21    TPro  5.8    /  Alb  3.2    /  TBili  0.2    /  DBili  x      /  AST  19     /  ALT  27     /  AlkPhos  67     13 Jun 2025 11:05          Blood Culture: Organism --  Gram Stain Blood -- Gram Stain --  Specimen Source Blood Blood-Peripheral  Culture-Blood --    Organism --  Gram Stain Blood -- Gram Stain --  Specimen Source Blood Blood-Peripheral  Culture-Blood --

## 2025-06-16 NOTE — PHARMACOTHERAPY INTERVENTION NOTE - COMMENTS
RAYRAY RODRIGUEZ, 74y Female with MRSA bacteremia, source possibly secondary to pneumonia per chart review. Patient was started on vancomycin/ertapenem empirically, now on vancomycin monotherapy for the MRSA bacteremia/pneumonia.    Recent levels have been 10-11 mg/L on the 750 mg q12h regimen (AUC ~450-470). Dose was increased to 1 gram q12h over the weekend.    Recommendation(s):  Suggest lowering dose of vancomycin back to 750 mg q12h since AUC is therapeutic, would not want to go too high given patient's age.    With kind regards,  Joss Luna, PharmD, BCIDP  Infectious Diseases Clinical Pharmacist  Available on [employer] Teams  .

## 2025-06-16 NOTE — PROGRESS NOTE ADULT - PROBLEM SELECTOR PLAN 1
--Met SIRS with leukocytosis and tachpnyea (RR 22) on initial presentation. Present on admission.   --Multifocal Pneumonia on Imaging.   --Bacteremia- MRSA 3/4 bottles, repeat 6/11 1/4+GPC in clusters, repeat bx q48 hours     TTE without endocarditis - SAFIA ordered   monitor repeat blood cultures > NGTD f/u with final results   c/w vancomycin - monitor trough  ID and pulmonology following  Patient with many antibiotic allergies --Met SIRS with leukocytosis and tachpnyea (RR 22) on initial presentation. Present on admission.   --Multifocal Pneumonia on Imaging.   --Bacteremia- MRSA 3/4 bottles, repeat 6/11 1/4+GPC in clusters, repeat bx q48 hours     TTE without endocarditis - SAFIA ordered, ENT called for clearance today, should be cleared for procedure  monitor repeat blood cultures > NGTD f/u with final results   c/w vancomycin - monitor trough, dosing adjusted per ID  ID and pulmonology following  Patient with many antibiotic allergies

## 2025-06-16 NOTE — PROGRESS NOTE ADULT - ASSESSMENT
74F w/ asthma (chronic methypred 8mg PO daily), bronchiectasis, allergic rhinitis, recurrent PNA, SDB ( on CPAP), tracheomalacia s/p tracheoplasty, tracheobronchomalacia, pAfib (Eliquis), colorectal ca s/p colostomy, aortic dissection s/p ascending aorta repair and prothestic aortic valve with severe AS s/p AVR, presents with cough, SOB and hemoptysis, found to have MRSA PNA and high grade bacteremial.     Home Airway clearance regimen: Albuterol q6h -> Chest Vest -> Perforomist BID  -> Pulmicort BID, - > Ohtuvayre 2.5 BID - > HyperSal 7% q12h. Patient also on Breo Ellipta 200 at 1 inhalation QD, Incruse Ellipta 1 puff QD, and on Tezspire airway     #MRSA PNA  #MRSA bacteremia  #Bronchiectasis and asthma exacerbation   #Hemoptysis  #SDB - on CPAP  - c/w Airway clearance regimen as possible Albuterol q6h -> Chest Vest -> Perforomist BID (patient brought in) -> Pulmicort BID, - > Ohtuvayre 2.5 BID - > HyperSal 7% q12h.  - c/w Eliquis for now, monitor for hemoptysis- no longer having for last 3 days  - Patient also on Breo Ellipta 200 at 1 inhalation QD, Incruse Ellipta 1 puff QD. Can use Advair/Spiriva while admitted  - Tezspire outpatient   - Agree with Vanc. f/u ID recs; pending SAFIA today   - c/w home CPAP and Chest Vest for use  - c/w home dose methylpred 8mg daily

## 2025-06-16 NOTE — CONSULT NOTE ADULT - PROBLEM SELECTOR RECOMMENDATION 9
No contraindication to SAFIA from an ENT standpoint.   F/U with private ENT as outpt  F/U Pulm -No absolute contraindication to SAFIA from an ENT standpoint.   -Follow up with with private ENT as outpt  -Follow up with Pulmonology for any lower airway concerns

## 2025-06-16 NOTE — PROGRESS NOTE ADULT - ASSESSMENT
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 Patient reporting worsening cough and SOB over last 2-3 days. Patient recently dx with MRSA on sputum culture, started treatment on Bactrim. Reports she recently started Bactrim ds 1 BID x 10 days (6/5/2025)    Afebrile here in the ED. WBC on admission elevated to 25K. CXR on admission showing Right patchy airspace opacity may reflect pneumonia. CT chest non contrast - Multifocal pneumonia. Found to have MRSA bacteremia. Has a TAVR in place (severe AS s/p TAVR (9/10/2023)),  Has Bilateral TKA ( 20 years ago), plate and screws across pubic symphysis.     06/11 MRI lumbar spine-  No evidence of discitis-osteomyelitis, epidural or paraspinal abscess.  TTE on 06/11- TAVR, There is trace paravalvular regurgitation. Moderate MR. Compared to the transthoracic echocardiogram performed on 7/3/2024, there have been no significant interval changes.  CTAP 06/12- Patchy cortical hypoenhancement in the bilateral renal lower poles, possibly pyelonephritis. UA on admission was negative, No abscess.  Pending SAFIA,      # High grade MRSA bacteremia ( left leg open wound likely source vs PNA)  # Multifocal PNA  # Recent MRSA PNA  # Left leg open wound   # S/P TAVR in 2023   # S/P internal fixation of remote fractures of the superior pubic rami and pubic symphysis  # Bilateral TKA - 20 years ago   # Leucocytosis     decreased  # Immunocompromised host   # severe persistent asthma-steroid dependent  # bronchiectasis/COPD-   # H/o multiple infections   # Chronic cough   # H/O multiple antibiotic allergies   # Elevated inflammatory markers        6/10: ESR/CRP = 31/136     6/13 ESR/CRP = 40/17    MICRO:   06/09 Bcx- MRSA ( 3/4) ( S- GARRETT: 1- Vancomycin)   06/09 Sputum cx-  MRSA  06/09 Urine cx- Negative   06/09 Urine strep and urine legionella- Negative   06/11 Bcx- 1/4 MRSA  6/13 BC x2 NGTD    06/02/25- Sputum cx- MRSA S- doxy, S- bactrim, S- linezolid, S- vancomycin     ABX;   Vancomycin 06/09-- current    74Y F complex PMH Epilepsy on Keppra, COPD, asthma  (on CPAP and VATS at home, on chronic steroids), DM2, bronchiectasis, tracheomalacia s/p tracheloplasty, HTN, Hx of dissection of descending thoracic aorta, hx of aortic aneurysm, Type B dissection (7/2024), medically managed initially as she did not meet criteria for surgery at that time), evaluated by Dr. 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 Patient reporting worsening cough and SOB over last 2-3 days. Patient recently dx with MRSA on sputum culture, started treatment on Bactrim. Reports she recently started Bactrim ds 1 BID x 10 days (6/5/2025)    Afebrile here in the ED. WBC on admission elevated to 25K. CXR on admission showing Right patchy airspace opacity may reflect pneumonia. CT chest non contrast - Multifocal pneumonia. Found to have MRSA bacteremia. Has a TAVR in place (severe AS s/p TAVR (9/10/2023)),  Has Bilateral TKA ( 20 years ago), plate and screws across pubic symphysis.     06/11 MRI lumbar spine-  No evidence of discitis-osteomyelitis, epidural or paraspinal abscess.  TTE on 06/11- TAVR, There is trace paravalvular regurgitation. Moderate MR. Compared to the transthoracic echocardiogram performed on 7/3/2024, there have been no significant interval changes.  CTAP 06/12- Patchy cortical hypoenhancement in the bilateral renal lower poles, possibly pyelonephritis. UA on admission was negative, No abscess.  Pending SAFIA  # High grade MRSA bacteremia ( left leg open wound likely source vs PNA)  # Multifocal PNA  # Recent MRSA PNA  # Left leg open wound   # S/P TAVR in 2023   # S/P internal fixation of remote fractures of the superior pubic rami and pubic symphysis  # Bilateral TKA - 20 years ago   # Leucocytosis     decreased  # Immunocompromised host   # severe persistent asthma-steroid dependent  # bronchiectasis/COPD-   # H/o multiple infections   # Chronic cough   # H/O multiple antibiotic allergies   # Elevated inflammatory markers        6/10: ESR/CRP = 31/136     6/13 ESR/CRP = 40/17    MICRO:   06/09 Bcx- MRSA ( 3/4) ( S- GARRETT: 1- Vancomycin)   06/09 Sputum cx-  MRSA  06/09 Urine cx- Negative   06/09 Urine strep and urine legionella- Negative   06/11 Bcx- 1/4 MRSA  6/13 BC x2 NGTD    06/02/25- Sputum cx- MRSA S- doxy, S- bactrim, S- linezolid, S- vancomycin     ABX;   Vancomycin 06/09-- current     Recommendations:   - TTE on 06/11- TAVR, There is trace paravalvular regurgitation. Moderate MR.   - Would check SAFIA to rule out endocarditis   - f/u BC from 06/13  - Vancomycin 750 mg Q12H  - monitor vancomycin levels

## 2025-06-16 NOTE — PROGRESS NOTE ADULT - ASSESSMENT
Shirley is a 73 yo F w/ asthma (chronic methypred 8mg PO daily), bronchiectasis, allergic rhinitis, recurrent PNA, SDB ( on CPAP), tracheomalacia s/p tracheoplasty, tracheobronchomalacia, pAfib (Eliquis), colorectal ca s/p colostomy, aortic dissection s/p ascending aorta repair who p/w cough, SOB and hemoptysis.     #SOB: Presented to OP Pulm office on 6/2 and those cultures were growing MRSA  Blood cx here positive for MRSA as well with clearance of blood cultures on 6/11  SOB thought to be 2/2 PNA/COPD exacerbation and pt feels markedly improved after treatment. Remains on RA this AM  Inhalers as per pulm   Steroids being decreased back to home dose     #MRSA bacteremia: Blood cx as noted above. Repeat cultures on 6/11 negative  However, given she has a bioprosthetic valve, a SAFIA indicated to ensure her infection has not seeded her valve  For SAFIA today    #PVCs: On chronic mexiletine therapy, continue   - Tele monitoring     #Dissection s/p repair: type A dissection s/p bioAVR and Bental in 2022 then severe AS s/p TAVR 9/2023:  - On Labetalol at home, currently on hold   - Would resume low dose Labetalol 100mg BID, will decrease nifedipine dosing if need be. BB ideal given her dissection history.

## 2025-06-16 NOTE — PROGRESS NOTE ADULT - SUBJECTIVE AND OBJECTIVE BOX
Follow Up:  MRSA bacteremic pneumonia    Interval History/ROS:  feels okay.  no fever.  tolerating vancomycin.  facial pain after fracture.  ROS otherwise negative.    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  H/O pelvic surgery 5 years ago - s/p fracture  History of tracheomalacia 2015 - attempted tracheal stenting ([facility])- course complicated by obstruction, respiratory failure, multiple CPR attempts -  stent discontinued; 10/20/2016 Tracheobronchoplasty (Prolene Mesh) performed at [facility] by Dr Zapien  S/P bronchoscopy 6/5/2018 - [place] (Dr Zapien) no evidence of tracheobronchomalacia in trachea or bronchial tubes  Rectal bleeding exam under anesthesia ([facility]) 2/2018  S/P TAVR (transcatheter aortic valve replacement)  S/P colostomy  S/P AVR (aortic valve replacement)    Allergies  Valium (Short breath)  OxyContin (Unknown)  Valium (Unknown)  Avelox (Short breath; Pruritus)  penicillin (Anaphylaxis)  Dilaudid (Short breath)  shellfish (Anaphylaxis)  ampicillin (Unknown)  codeine (Short breath)  Percocet (Unknown)  tetanus toxoid (Short breath)  cefepime (Anaphylaxis)  cefepime (Short breath (Severe))  codeine (Unknown)  iodine (Short breath; Swelling)  aspirin (Short breath)  meropenem (Unknown)    ANTIMICROBIALS:    vancomycin  IVPB 750 every 12 hours    MEDICATIONS  (STANDING):  albuterol    0.083% 2.5 every 6 hours  apixaban 5 two times a day  budesonide    Inhalation Suspension 1 two times a day  famotidine    Tablet 20 daily  fluticasone propionate/ salmeterol 250-50 MICROgram(s) Diskus 1 two times a day  formoterol for nebulization 20 every 12 hours  insulin glargine Injectable (LANTUS) 20 at bedtime  insulin lispro (ADMELOG) corrective regimen sliding scale  every 6 hours  insulin lispro Injectable (ADMELOG) 2 before breakfast  insulin lispro Injectable (ADMELOG) 4 before lunch  insulin lispro Injectable (ADMELOG) 4 before dinner  lacosamide 100 two times a day  levETIRAcetam 1000 two times a day  methylPREDNISolone 8 daily  mexiletine 200 three times a day  mineral oil 30 daily  montelukast 10 at bedtime  NIFEdipine XL 60 daily  pantoprazole    Tablet 40 before breakfast  polyethylene glycol 3350 17 daily  rosuvastatin 5 at bedtime  senna 2 at bedtime  sertraline 50 daily  sodium chloride 7% Inhalation 4 every 12 hours  tiotropium 2.5 MICROgram(s) Inhaler 2 daily    Vital Signs Last 24 Hrs  T(F): 98.1 (06-16-25 @ 05:53), Max: 98.8 (06-15-25 @ 20:01)  HR: 70 (06-16-25 @ 05:53)  BP: 119/72 (06-16-25 @ 05:53)  RR: 18 (06-16-25 @ 05:53)  SpO2: 97% (06-16-25 @ 05:53) (95% - 98%)  Wt(kg): --    PHYSICAL EXAM:  General: NAD, Non-toxic  Neurology: A&Ox3, nonfocal  Respiratory: Clear to auscultation bilaterally  CV: RRR, S1S2, no murmurs, rubs or gallops  Abdominal: Soft, Non-tender, non-distended, normal bowel sounds  Extremities: No edema, no splinter hem  Line Sites: Clear left IJ line  Skin: No rash                          12.9   13.05 )-----------( 218      ( 15 Christopher 2025 09:32 )             40.8   WBC Count: 13.05 (06-15 @ 09:32)  WBC Count: 13.74 (06-14 @ 11:21)  WBC Count: 19.12 (06-13 @ 11:05)  WBC Count: 17.81 (06-12 @ 11:20)  WBC Count: 17.63 (06-11 @ 09:50)       06-15    140  |  105  |  22  ----------------------------<  112[H]  3.8   |  22  |  0.64    Ca    8.7      15 Christopher 2025 09:32    Urinalysis + Microscopic Examination (06.09.25 @ 17:56)   pH Urine: 7.0  Urine Appearance: Clear  Color: Yellow  Specific Gravity: 1.015  Protein, Urine: Negative: Chemistry tests performed on [employer] instrument.   (Short sample)  Glucose Qualitative, Urine: Negative  Ketone , Urine: Negative  Blood, Urine: Negative  Bilirubin: Negative  Urobilinogen: <2 mg/dL  Leukocyte Esterase Concentration: Negative  Nitrite: Negative  Review: Reviewed  White Blood Cell - Urine: 1 /HPF  Red Blood Cell - Urine: 2 /HPF  Bacteria: Negative /HPF  Cast: 0 /LPF  Epithelial Cells: 0 /HPF  MICROBIOLOGY:  Blood Blood-Peripheral  06-13-25   No growth at 24 hours  --  --      Blood Blood-Peripheral  06-13-25   No growth at 24 hours  --  --      Blood Blood  06-11-25   No growth at 72 Hours  --  --      Blood Blood-Peripheral  06-11-25   Growth in anaerobic bottle: Methicillin Resistant Staphylococcus aureus  See previous culture [id number]  --    Growth in anaerobic bottle: Gram Positive Cocci in Clusters      Sputum Sputum  06-10-25   Numerous Methicillin Resistant Staphylococcus aureus  Normal Respiratory Val absent  --  Methicillin resistant Staphylococcus aureus      Clean Catch Clean Catch (Midstream)  06-09-25   <10,000 CFU/mL Normal Urogenital Val  --  --      Sputum Sputum  06-09-25   Moderate Methicillin Resistant Staphylococcus aureus  Commensal val consistent with body site  --  Methicillin resistant Staphylococcus aureus      Blood Blood-Peripheral  06-09-25   Growth in anaerobic bottle: Methicillin Resistant Staphylococcus aureus  --  Methicillin resistant Staphylococcus aureus      Blood Blood-Peripheral  06-09-25   Growth in aerobic and anaerobic bottles: Methicillin Resistant  Staphylococcus aureus  See previous culture  [id number] for susceptibility  Direct identification is available within approximately 3-5  hours either by Blood Panel Multiplexed PCR or Direct  MALDI-TOF. Details: [email or web address]  --  Blood Culture PCR    Vancomycin Level, Trough: 10.9 ug/mL (06-15-25 @ 09:32)      RADIOLOGY:  < from: CT Abdomen and Pelvis w/ IV Cont (06.12.25 @ 15:12) >  FINDINGS:  LOWER CHEST: Patchy opacities in the right middle lobe, decreased. Trace   left pleural fluid. TAVR. Sternotomy.    LIVER: Within normal limits.  BILE DUCTS: Normal caliber.  GALLBLADDER: Within normal limits.  SPLEEN: Within normal limits.  PANCREAS: Stablemultiple cystic lesions.  ADRENALS: Within normal limits.  KIDNEYS/URETERS: No hydronephrosis. Indeterminate left renal hypodense   lesions measuring higher than simple fluid attenuation, unchanged. Patchy   cortical hypoenhancement in the bilateral lower poles.    BLADDER: Within normal limits.  REPRODUCTIVE ORGANS: Hysterectomy.    BOWEL: Status post APR and left lower quadrant colostomy. No bowel   obstruction. Appendix is normal.  PERITONEUM/RETROPERITONEUM: Within normal limits.  VESSELS: Chronic aortic dissection.  LYMPH NODES: No lymphadenopathy.  ABDOMINAL WALL: Within normal limits.  BONES: Internal fixation at the right sacroiliac joint and pubic   symphysis. Bilateral femoral head AVN right greater than left.    IMPRESSION:  Patchycortical hypoenhancement in the bilateral renal lower poles,   possibly pyelonephritis.    No abscess.    < end of copied text >  < from: MR Lumbar Spine w/wo IV Cont (06.11.25 @ 10:44) >  IMPRESSION:  1. No evidence of discitis-osteomyelitis, epidural or paraspinal abscess.  2. Stable grade 1 anterolisthesis L3 over L4 with severe bilateral L3-L4   facet arthrosis.  3. L3-L4 anterolisthesis with a stable left foraminal-lateral disc   protrusion superimposed upon a disc bulge, resulting in moderate central   canal and mild left neural foramen stenosis with facet arthrosis and   ligamentum flavum hypertrophy, crowding the nerve roots of the cauda   equina. Left foraminal-lateral annular tear.  4. L4-L5 stable right foraminal-lateral disc protrusion superimposed upon   a disc bulge, resulting in mild central canal, bilateral lateral recess,   moderate right and mild left neural foramen stenosis with facet arthrosis   and ligamentum flavum hypertrophy, contacting the right L4 and L5 nerve   roots.  5. L5-S1 stable disc bulge-spondylitic ridge complex, resulting in mild   central canal and severe bilateral neural foramen stenosis with facet   arthrosis, impinging upon bilateral L5 nerve roots.  6. Additional findings, including those degenerative, described in detail   above.    < end of copied text >  < from: CT Chest No Cont (06.09.25 @ 15:14) >  IMPRESSION:  Multifocal pneumonia. Recommend follow-up chest CT in 3 months to ensure   clearance.    < end of copied text >  < from: TTE W or WO Ultrasound Enhancing Agent (06.11.25 @ 11:42) >  CONCLUSIONS:      1. Left ventricular systolic function is hyperdynamic with an ejection fraction of 81 % by Felipe's method of disks. There are no regional wall motion abnormalities seen.   2. Mild left ventricular hypertrophy.   3. There is moderate (grade 2) left ventricular diastolic dysfunction.   4. Enlarged right ventricular cavity size and mild to moderately reduced right ventricular systolic function. Tricuspid annular plane systolic excursion (TAPSE) is 1.1 cm (normal >=1.7 cm). Tricuspid annular tissue Doppler S' is 10.0 cm/s (normal >10 cm/s).   5. Moderate mitral regurgitation.   6. Estimated pulmonary artery systolic pressure is 46 mmHg.   7. No significant valvular disease.   8. The inferior vena cava is normal in size (normal <2.1cm) with normal inspiratory collapse (normal >50%) consistent with normal right atrial pressure (~3, range 0-5mmHg).   9. Compared to the transthoracic echocardiogram performed on 7/3/2024, there have been no significant interval changes.    < end of copied text >      Steven Combs MD; Division of Infectious Disease; Pager: [phone]; nights and weekends: [phone] Follow Up:  MRSA bacteremic pneumonia    Interval History/ROS:  feels okay.  no fever.  tolerating vancomycin.  facial pain after fracture.  ROS otherwise negative.    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  H/O pelvic surgery 5 years ago - s/p fracture  History of tracheomalacia 2015 - attempted tracheal stenting ([facility])- course complicated by obstruction, respiratory failure, multiple CPR attempts -  stent discontinued; 10/20/2016 Tracheobronchoplasty (Prolene Mesh) performed at [facility] by Dr Zapien  S/P bronchoscopy 6/5/2018 - [place] (Dr Zapien) no evidence of tracheobronchomalacia in trachea or bronchial tubes  Rectal bleeding exam under anesthesia ([facility]) 2/2018  S/P TAVR (transcatheter aortic valve replacement)  S/P colostomy  S/P AVR (aortic valve replacement)    Allergies  Valium (Short breath)  OxyContin (Unknown)  Valium (Unknown)  Avelox (Short breath; Pruritus)  penicillin (Anaphylaxis)  Dilaudid (Short breath)  shellfish (Anaphylaxis)  ampicillin (Unknown)  codeine (Short breath)  Percocet (Unknown)  tetanus toxoid (Short breath)  cefepime (Anaphylaxis)  cefepime (Short breath (Severe))  codeine (Unknown)  iodine (Short breath; Swelling)  aspirin (Short breath)  meropenem (Unknown)    ANTIMICROBIALS:    vancomycin  IVPB 750 every 12 hours    MEDICATIONS  (STANDING):  albuterol    0.083% 2.5 every 6 hours  apixaban 5 two times a day  budesonide    Inhalation Suspension 1 two times a day  famotidine    Tablet 20 daily  fluticasone propionate/ salmeterol 250-50 MICROgram(s) Diskus 1 two times a day  formoterol for nebulization 20 every 12 hours  insulin glargine Injectable (LANTUS) 20 at bedtime  insulin lispro (ADMELOG) corrective regimen sliding scale  every 6 hours  insulin lispro Injectable (ADMELOG) 2 before breakfast  insulin lispro Injectable (ADMELOG) 4 before lunch  insulin lispro Injectable (ADMELOG) 4 before dinner  lacosamide 100 two times a day  levETIRAcetam 1000 two times a day  methylPREDNISolone 8 daily  mexiletine 200 three times a day  mineral oil 30 daily  montelukast 10 at bedtime  NIFEdipine XL 60 daily  pantoprazole    Tablet 40 before breakfast  polyethylene glycol 3350 17 daily  rosuvastatin 5 at bedtime  senna 2 at bedtime  sertraline 50 daily  sodium chloride 7% Inhalation 4 every 12 hours  tiotropium 2.5 MICROgram(s) Inhaler 2 daily    Vital Signs Last 24 Hrs  T(F): 98.1 (06-16-25 @ 05:53), Max: 98.8 (06-15-25 @ 20:01)  HR: 70 (06-16-25 @ 05:53)  BP: 119/72 (06-16-25 @ 05:53)  RR: 18 (06-16-25 @ 05:53)  SpO2: 97% (06-16-25 @ 05:53) (95% - 98%)  Wt(kg): --    PHYSICAL EXAM:  Constitutional: non-toxic  HEAD/EYES: anicteric  ENT:  supple  Cardiovascular:   normal S1, S2  Respiratory:  clear BS bilaterally  GI:  soft, non-tender, normal bowel sounds  :  no ramirez  Musculoskeletal:  no synovitis  Neurologic: awake and alert, normal strength, no focal findings  Skin:  no rash  Psychiatric:  awake, alert, appropriate mood                        13.6   9.18  )-----------( 250      ( 16 Jun 2025 06:44 )             44.1 06-16    139  |  103  |  17  ----------------------------<  166  3.9   |  23  |  0.70  Ca    9.1      16 Jun 2025 06:44    MICROBIOLOGY:  Vancomycin Level, Trough: 11.1 (06-16 @ 06:44)  Vancomycin Level, Trough: 10.9 (06-15 @ 09:32)  Vancomycin Level, Trough: 10.5 (06-13 @ 12:23)  Vancomycin Level, Trough: 4.5 (06-11 @ 08:24)    Culture - Blood (collected 06-13-25 @ 10:39)  Source: Blood Blood-Peripheral  Preliminary Report (06-15-25 @ 17:01):    No growth at 48 Hours    Culture - Blood (collected 06-13-25 @ 10:34)  Source: Blood Blood-Peripheral  Preliminary Report (06-15-25 @ 17:01):    No growth at 48 Hours    Culture - Blood (collected 06-11-25 @ 09:10)  Source: Blood Blood  Final Report (06-16-25 @ 14:01):    No growth at 5 days    Culture - Blood (collected 06-11-25 @ 05:45)  Source: Blood Blood-Peripheral  Gram Stain (06-12-25 @ 16:52):    Growth in anaerobic bottle: Gram Positive Cocci in Clusters  Final Report (06-13-25 @ 14:39):    Growth in anaerobic bottle: Methicillin Resistant Staphylococcus aureus    See previous culture [id number]    Culture - Sputum (collected 06-10-25 @ 07:28)  Source: Sputum Sputum  Gram Stain (06-10-25 @ 16:43):    Few polymorphonuclear leukocytes per low power field    Few Squamous epithelial cells per low power field    Few Gram Positive Cocci in Clusters per oil power field    Rare Gram Negative Rods per oil power field  Final Report (06-12-25 @ 06:45):    Numerous Methicillin Resistant Staphylococcus aureus    Normal Respiratory Val absent  Organism: Methicillin resistant Staphylococcus aureus (06-12-25 @ 06:45)  Organism: Methicillin resistant Staphylococcus aureus (06-12-25 @ 06:45)      -  Clindamycin: S <=0.25      -  Oxacillin: R >2      -  Gentamicin: S <=4 Should not be used as monotherapy      -  Linezolid: S 2      -  Vancomycin: S 1      -  Tetracycline: S <=4      Method Type: GARRETT      -  Penicillin: R >2      -  Rifampin: S <=1 Should not be used as monotherapy      -  Erythromycin: R >4      -  Trimethoprim/Sulfamethoxazole: R >2/38    Culture - Urine (collected 06-09-25 @ 17:56)  Source: Clean Catch Clean Catch (Midstream)  Final Report (06-10-25 @ 19:09):    <10,000 CFU/mL Normal Urogenital Val    Culture - Sputum (collected 06-09-25 @ 14:19)  Source: Sputum Sputum  Gram Stain (06-10-25 @ 00:18):    Moderate polymorphonuclear leukocytes per low power field    Few Squamous epithelial cells per low power field    Few Gram Positive Cocci in Clusters per oil power field    Rare Gram Negative Rods per oil power field  Final Report (06-11-25 @ 14:51):    Moderate Methicillin Resistant Staphylococcus aureus    Commensal val consistent with body site  Organism: Methicillin resistant Staphylococcus aureus (06-11-25 @ 14:51)  Organism: Methicillin resistant Staphylococcus aureus (06-11-25 @ 14:51)      -  Clindamycin: S <=0.25      -  Oxacillin: R >2      -  Gentamicin: S <=4 Should not be used as monotherapy      -  Linezolid: S 2      -  Vancomycin: S 1      -  Tetracycline: S <=4      Method Type: GARRETT      -  Penicillin: R >2      -  Rifampin: S <=1 Should not be used as monotherapy      -  Erythromycin: R >4      -  Trimethoprim/Sulfamethoxazole: R >2/38    Culture - Blood (collected 06-09-25 @ 08:15)  Source: Blood Blood-Peripheral  Gram Stain (06-10-25 @ 11:12):    Growth in anaerobic bottle: Gram Positive Cocci in Clusters  Final Report (06-12-25 @ 09:23):    Growth in anaerobic bottle: Methicillin Resistant Staphylococcus aureus  Organism: Methicillin resistant Staphylococcus aureus (06-12-25 @ 09:23)  Organism: Methicillin resistant Staphylococcus aureus (06-12-25 @ 09:23)      -  Clindamycin: S <=0.25      -  Oxacillin: R >2      -  Gentamicin: S <=4 Should not be used as monotherapy      -  Daptomycin: S 1      -  Linezolid: S 4      -  Vancomycin: S 1      -  Tetracycline: S <=4      Method Type: GARRETT      -  Penicillin: R >2      -  Rifampin: S <=1 Should not be used as monotherapy      -  Erythromycin: R >4      -  Trimethoprim/Sulfamethoxazole: R >2/38    Culture - Blood (collected 06-09-25 @ 08:00)  Source: Blood Blood-Peripheral  Gram Stain (06-10-25 @ 08:45):    Growth in anaerobic bottle: Gram Positive Cocci in Clusters    Growth in aerobic bottle: Gram Positive Cocci in Clusters  Final Report (06-12-25 @ 09:24):    Growth in aerobic and anaerobic bottles: Methicillin Resistant    Staphylococcus aureus    See previous culture  [id number] for susceptibility    Direct identification is available within approximately 3-5    hours either by Blood Panel Multiplexed PCR or Direct    MALDI-TOF. Details: [email or web address]  Organism: Blood Culture PCR (06-12-25 @ 09:24)  Organism: Blood Culture PCR (06-12-25 @ 09:24)      Method Type: PCR      -  Methicillin resistant Staphylococcus aureus (MRSA): Detec    RADIOLOGY:  CT Abdomen and Pelvis w/ IV Cont (06.12.25 @ 15:12) >  IMPRESSION:  Patchy cortical hypoenhancement in the bilateral renal lower poles, possibly pyelonephritis.  No abscess.    MR Lumbar Spine w/wo IV Cont (06.11.25 @ 10:44) >  IMPRESSION:  1. No evidence of discitis-osteomyelitis, epidural or paraspinal abscess.  2. Stable grade 1 anterolisthesis L3 over L4 with severe bilateral L3-L4 facet arthrosis.  3. L3-L4 anterolisthesis with a stable left foraminal-lateral disc protrusion superimposed upon a disc bulge, resulting in moderate central canal and mild left neural foramen stenosis with facet arthrosis and ligamentum flavum hypertrophy, crowding the nerve roots of the cauda equina. Left foraminal-lateral annular tear.  4. L4-L5 stable right foraminal-lateral disc protrusion superimposed upon a disc bulge, resulting in mild central canal, bilateral lateral recess, moderate right and mild left neural foramen stenosis with facet arthrosis and ligamentum flavum hypertrophy, contacting the right L4 and L5 nerve   roots.  5. L5-S1 stable disc bulge-spondylitic ridge complex, resulting in mild central canal and severe bilateral neural foramen stenosis with facet arthrosis, impinging upon bilateral L5 nerve roots.  6. Additional findings, including those degenerative, described in detail above.    CT Chest No Cont (06.09.25 @ 15:14) >  IMPRESSION:  Multifocal pneumonia. Recommend follow-up chest CT in 3 months to ensure clearance.        ECHO:  TTE W or WO Ultrasound Enhancing Agent (06.11.25 @ 11:42) >  CONCLUSIONS:   1. Left ventricular systolic function is hyperdynamic with an ejection fraction of 81 % by Felipe's method of disks. There are no regional wall motion abnormalities seen.   2. Mild left ventricular hypertrophy.   3. There is moderate (grade 2) left ventricular diastolic dysfunction.   4. Enlarged right ventricular cavity size and mild to moderately reduced right ventricular systolic function. Tricuspid annular plane systolic excursion (TAPSE) is 1.1 cm (normal >=1.7 cm). Tricuspid annular tissue Doppler S' is 10.0 cm/s (normal >10 cm/s).   5. Moderate mitral regurgitation.   6. Estimated pulmonary artery systolic pressure is 46 mmHg.   7. No significant valvular disease.   8. The inferior vena cava is normal in size (normal <2.1cm) with normal inspiratory collapse (normal >50%) consistent with normal right atrial pressure (~3, range 0-5mmHg).   9. Compared to the transthoracic echocardiogram performed on 7/3/2024, there have been no significant interval changes.

## 2025-06-17 ENCOUNTER — RESULT REVIEW (OUTPATIENT)
Age: 75
End: 2025-06-17

## 2025-06-17 LAB
ANION GAP SERPL CALC-SCNC: 12 MMOL/L — SIGNIFICANT CHANGE UP (ref 5–17)
APTT BLD: 31.4 SEC — SIGNIFICANT CHANGE UP (ref 26.1–36.8)
BUN SERPL-MCNC: 15 MG/DL — SIGNIFICANT CHANGE UP (ref 7–23)
CALCIUM SERPL-MCNC: 8.8 MG/DL — SIGNIFICANT CHANGE UP (ref 8.4–10.5)
CHLORIDE SERPL-SCNC: 102 MMOL/L — SIGNIFICANT CHANGE UP (ref 96–108)
CO2 SERPL-SCNC: 23 MMOL/L — SIGNIFICANT CHANGE UP (ref 22–31)
CREAT SERPL-MCNC: 0.7 MG/DL — SIGNIFICANT CHANGE UP (ref 0.5–1.3)
EGFR: 90 ML/MIN/1.73M2 — SIGNIFICANT CHANGE UP
EGFR: 90 ML/MIN/1.73M2 — SIGNIFICANT CHANGE UP
GLUCOSE BLDC GLUCOMTR-MCNC: 144 MG/DL — HIGH (ref 70–99)
GLUCOSE BLDC GLUCOMTR-MCNC: 232 MG/DL — HIGH (ref 70–99)
GLUCOSE BLDC GLUCOMTR-MCNC: 234 MG/DL — HIGH (ref 70–99)
GLUCOSE BLDC GLUCOMTR-MCNC: 239 MG/DL — HIGH (ref 70–99)
GLUCOSE SERPL-MCNC: 205 MG/DL — HIGH (ref 70–99)
HCT VFR BLD CALC: 42.3 % — SIGNIFICANT CHANGE UP (ref 34.5–45)
HGB BLD-MCNC: 13 G/DL — SIGNIFICANT CHANGE UP (ref 11.5–15.5)
INR BLD: 1.14 RATIO — SIGNIFICANT CHANGE UP (ref 0.85–1.16)
MAGNESIUM SERPL-MCNC: 2 MG/DL — SIGNIFICANT CHANGE UP (ref 1.6–2.6)
MCHC RBC-ENTMCNC: 23.7 PG — LOW (ref 27–34)
MCHC RBC-ENTMCNC: 30.7 G/DL — LOW (ref 32–36)
MCV RBC AUTO: 77 FL — LOW (ref 80–100)
NRBC BLD AUTO-RTO: 0 /100 WBCS — SIGNIFICANT CHANGE UP (ref 0–0)
PHOSPHATE SERPL-MCNC: 2.4 MG/DL — LOW (ref 2.5–4.5)
PLATELET # BLD AUTO: 210 K/UL — SIGNIFICANT CHANGE UP (ref 150–400)
POTASSIUM SERPL-MCNC: 4.1 MMOL/L — SIGNIFICANT CHANGE UP (ref 3.5–5.3)
POTASSIUM SERPL-SCNC: 4.1 MMOL/L — SIGNIFICANT CHANGE UP (ref 3.5–5.3)
PROTHROM AB SERPL-ACNC: 13 SEC — SIGNIFICANT CHANGE UP (ref 9.9–13.4)
RBC # BLD: 5.49 M/UL — HIGH (ref 3.8–5.2)
RBC # FLD: 16.7 % — HIGH (ref 10.3–14.5)
SODIUM SERPL-SCNC: 137 MMOL/L — SIGNIFICANT CHANGE UP (ref 135–145)
VANCOMYCIN TROUGH SERPL-MCNC: 10.9 UG/ML — SIGNIFICANT CHANGE UP (ref 10–20)
WBC # BLD: 8.26 K/UL — SIGNIFICANT CHANGE UP (ref 3.8–10.5)
WBC # FLD AUTO: 8.26 K/UL — SIGNIFICANT CHANGE UP (ref 3.8–10.5)

## 2025-06-17 PROCEDURE — 93312 ECHO TRANSESOPHAGEAL: CPT | Mod: 26

## 2025-06-17 PROCEDURE — 99232 SBSQ HOSP IP/OBS MODERATE 35: CPT

## 2025-06-17 PROCEDURE — 99233 SBSQ HOSP IP/OBS HIGH 50: CPT

## 2025-06-17 PROCEDURE — 93320 DOPPLER ECHO COMPLETE: CPT | Mod: 26

## 2025-06-17 PROCEDURE — G0545: CPT

## 2025-06-17 PROCEDURE — 93325 DOPPLER ECHO COLOR FLOW MAPG: CPT | Mod: 26

## 2025-06-17 PROCEDURE — 76377 3D RENDER W/INTRP POSTPROCES: CPT | Mod: 26

## 2025-06-17 RX ORDER — ACETAMINOPHEN 500 MG/5ML
1000 LIQUID (ML) ORAL ONCE
Refills: 0 | Status: COMPLETED | OUTPATIENT
Start: 2025-06-17 | End: 2025-06-17

## 2025-06-17 RX ORDER — INSULIN LISPRO 100 U/ML
INJECTION, SOLUTION INTRAVENOUS; SUBCUTANEOUS
Refills: 0 | Status: DISCONTINUED | OUTPATIENT
Start: 2025-06-17 | End: 2025-06-21

## 2025-06-17 RX ORDER — SOD PHOS DI, MONO/K PHOS MONO 250 MG
1 TABLET ORAL ONCE
Refills: 0 | Status: COMPLETED | OUTPATIENT
Start: 2025-06-17 | End: 2025-06-17

## 2025-06-17 RX ORDER — FENTANYL CITRATE-0.9 % NACL/PF 100MCG/2ML
25 SYRINGE (ML) INTRAVENOUS ONCE
Refills: 0 | Status: DISCONTINUED | OUTPATIENT
Start: 2025-06-17 | End: 2025-06-17

## 2025-06-17 RX ORDER — SODIUM CHLORIDE 9 G/1000ML
1000 INJECTION, SOLUTION INTRAVENOUS
Refills: 0 | Status: DISCONTINUED | OUTPATIENT
Start: 2025-06-17 | End: 2025-06-17

## 2025-06-17 RX ADMIN — MEXILETINE HYDROCHLORIDE 200 MILLIGRAM(S): 250 CAPSULE ORAL at 21:59

## 2025-06-17 RX ADMIN — Medication 250 MILLIGRAM(S): at 18:16

## 2025-06-17 RX ADMIN — LEVETIRACETAM 1000 MILLIGRAM(S): 10 INJECTION, SOLUTION INTRAVENOUS at 17:47

## 2025-06-17 RX ADMIN — Medication 400 MILLIGRAM(S): at 01:04

## 2025-06-17 RX ADMIN — INSULIN LISPRO 2: 100 INJECTION, SOLUTION INTRAVENOUS; SUBCUTANEOUS at 22:00

## 2025-06-17 RX ADMIN — Medication 60 MILLIGRAM(S): at 05:50

## 2025-06-17 RX ADMIN — TIOTROPIUM BROMIDE INHALATION SPRAY 2 PUFF(S): 3.12 SPRAY, METERED RESPIRATORY (INHALATION) at 14:51

## 2025-06-17 RX ADMIN — Medication 1 DROP(S): at 14:50

## 2025-06-17 RX ADMIN — Medication 250 MILLIGRAM(S): at 05:51

## 2025-06-17 RX ADMIN — MEXILETINE HYDROCHLORIDE 200 MILLIGRAM(S): 250 CAPSULE ORAL at 05:49

## 2025-06-17 RX ADMIN — Medication 1 DOSE(S): at 17:48

## 2025-06-17 RX ADMIN — LACOSAMIDE 100 MILLIGRAM(S): 150 TABLET, FILM COATED ORAL at 17:46

## 2025-06-17 RX ADMIN — INSULIN LISPRO 2: 100 INJECTION, SOLUTION INTRAVENOUS; SUBCUTANEOUS at 12:24

## 2025-06-17 RX ADMIN — METHYLPREDNISOLONE ACETATE 8 MILLIGRAM(S): 80 INJECTION, SUSPENSION INTRA-ARTICULAR; INTRALESIONAL; INTRAMUSCULAR; SOFT TISSUE at 05:49

## 2025-06-17 RX ADMIN — Medication 1000 MILLIGRAM(S): at 01:34

## 2025-06-17 RX ADMIN — LACOSAMIDE 100 MILLIGRAM(S): 150 TABLET, FILM COATED ORAL at 05:52

## 2025-06-17 RX ADMIN — INSULIN LISPRO 4 UNIT(S): 100 INJECTION, SOLUTION INTRAVENOUS; SUBCUTANEOUS at 16:14

## 2025-06-17 RX ADMIN — Medication 400 MILLIGRAM(S): at 12:25

## 2025-06-17 RX ADMIN — Medication 2 TABLET(S): at 21:58

## 2025-06-17 RX ADMIN — LEVETIRACETAM 1000 MILLIGRAM(S): 10 INJECTION, SOLUTION INTRAVENOUS at 05:50

## 2025-06-17 RX ADMIN — Medication 1 APPLICATION(S): at 14:50

## 2025-06-17 RX ADMIN — Medication 1 DROP(S): at 05:50

## 2025-06-17 RX ADMIN — Medication 25 MICROGRAM(S): at 12:40

## 2025-06-17 RX ADMIN — MEXILETINE HYDROCHLORIDE 200 MILLIGRAM(S): 250 CAPSULE ORAL at 14:49

## 2025-06-17 RX ADMIN — APIXABAN 5 MILLIGRAM(S): 2.5 TABLET, FILM COATED ORAL at 05:51

## 2025-06-17 RX ADMIN — ROSUVASTATIN CALCIUM 5 MILLIGRAM(S): 20 TABLET, FILM COATED ORAL at 21:59

## 2025-06-17 RX ADMIN — Medication 1 DROP(S): at 21:59

## 2025-06-17 RX ADMIN — INSULIN LISPRO 2: 100 INJECTION, SOLUTION INTRAVENOUS; SUBCUTANEOUS at 06:15

## 2025-06-17 RX ADMIN — Medication 40 MILLIGRAM(S): at 05:49

## 2025-06-17 RX ADMIN — Medication 1 DROP(S): at 17:47

## 2025-06-17 RX ADMIN — APIXABAN 5 MILLIGRAM(S): 2.5 TABLET, FILM COATED ORAL at 17:48

## 2025-06-17 RX ADMIN — Medication 1 APPLICATION(S): at 17:47

## 2025-06-17 RX ADMIN — INSULIN GLARGINE-YFGN 20 UNIT(S): 100 INJECTION, SOLUTION SUBCUTANEOUS at 21:59

## 2025-06-17 RX ADMIN — Medication 1000 MILLIGRAM(S): at 12:45

## 2025-06-17 RX ADMIN — Medication 1 PACKET(S): at 14:49

## 2025-06-17 RX ADMIN — Medication 20 MILLIGRAM(S): at 12:25

## 2025-06-17 RX ADMIN — MONTELUKAST SODIUM 10 MILLIGRAM(S): 10 TABLET ORAL at 21:59

## 2025-06-17 RX ADMIN — Medication 25 MICROGRAM(S): at 12:25

## 2025-06-17 NOTE — PROGRESS NOTE ADULT - SUBJECTIVE AND OBJECTIVE BOX
[facility]    Le Jung, Bev, Jim, Mercedez, Sridhar      [phone]    CHIEF COMPLAINT: Patient is a 74y old  Female who presents with a chief complaint of SOB (16 Jun 2025 14:04)      Follow Up:  staph bacteremia, hx of avr    Interim history: The patient reports no new symptoms.  Denies chest discomfort and shortness of breath.  No abdominal pain.  No new neurologic symptoms.      MEDICATIONS  (STANDING):  albuterol    0.083% 2.5 milliGRAM(s) Nebulizer every 6 hours  apixaban 5 milliGRAM(s) Oral two times a day  artificial  tears Solution 1 Drop(s) Both EYES every 4 hours  budesonide    Inhalation Suspension 1 milliGRAM(s) Inhalation two times a day  chlorhexidine 2% Cloths 1 Application(s) Topical daily  dextrose 5%. 1000 milliLiter(s) (100 mL/Hr) IV Continuous <Continuous>  dextrose 5%. 1000 milliLiter(s) (50 mL/Hr) IV Continuous <Continuous>  dextrose 50% Injectable 25 Gram(s) IV Push once  dextrose 50% Injectable 12.5 Gram(s) IV Push once  dextrose 50% Injectable 25 Gram(s) IV Push once  famotidine    Tablet 20 milliGRAM(s) Oral daily  fluticasone propionate/ salmeterol 250-50 MICROgram(s) Diskus 1 Dose(s) Inhalation two times a day  formoterol for nebulization 20 MICROGram(s) Nebulizer every 12 hours  glucagon  Injectable 1 milliGRAM(s) IntraMuscular once  insulin glargine Injectable (LANTUS) 20 Unit(s) SubCutaneous at bedtime  insulin lispro (ADMELOG) corrective regimen sliding scale   SubCutaneous every 6 hours  insulin lispro Injectable (ADMELOG) 4 Unit(s) SubCutaneous before lunch  insulin lispro Injectable (ADMELOG) 4 Unit(s) SubCutaneous before dinner  insulin lispro Injectable (ADMELOG) 2 Unit(s) SubCutaneous before breakfast  lacosamide 100 milliGRAM(s) Oral two times a day  levETIRAcetam 1000 milliGRAM(s) Oral two times a day  methylPREDNISolone 8 milliGRAM(s) Oral daily  mexiletine 200 milliGRAM(s) Oral three times a day  mineral oil 30 milliLiter(s) Oral daily  montelukast 10 milliGRAM(s) Oral at bedtime  NIFEdipine XL 60 milliGRAM(s) Oral daily  Ohtuvayre 3 mg/2.5 mL Inhalation 3 milliGRAM(s) 3 milliGRAM(s) Nebulizer every 12 hours  pantoprazole    Tablet 40 milliGRAM(s) Oral before breakfast  polyethylene glycol 3350 17 Gram(s) Oral daily  rosuvastatin 5 milliGRAM(s) Oral at bedtime  senna 2 Tablet(s) Oral at bedtime  sertraline 50 milliGRAM(s) Oral daily  silver sulfADIAZINE 1% Cream 1 Application(s) Topical daily  sodium chloride 0.9%. 1000 milliLiter(s) (75 mL/Hr) IV Continuous <Continuous>  sodium chloride 7% Inhalation 4 milliLiter(s) Inhalation every 12 hours  tiotropium 2.5 MICROgram(s) Inhaler 2 Puff(s) Inhalation daily  vancomycin  IVPB 750 milliGRAM(s) IV Intermittent every 12 hours    MEDICATIONS  (PRN):  dextrose Oral Gel 15 Gram(s) Oral once PRN Blood Glucose LESS THAN 70 milliGRAM(s)/deciliter  lactulose Syrup 10 Gram(s) Oral daily PRN constipation  magnesium hydroxide Suspension 30 milliLiter(s) Oral daily PRN Constipation  melatonin 3 milliGRAM(s) Oral at bedtime PRN Insomnia  ondansetron Injectable 4 milliGRAM(s) IV Push every 8 hours PRN Nausea and/or Vomiting      REVIEW OF SYSTEMS:  eye, ent, GI, , allergic, dermatologic, musculoskeletal and neurologic are negative except as described above    Vital Signs Last 24 Hrs  T(C): 36.8 (17 Jun 2025 04:14), Max: 37.1 (16 Jun 2025 11:53)  T(F): 98.2 (17 Jun 2025 04:14), Max: 98.7 (16 Jun 2025 11:53)  HR: 78 (17 Jun 2025 04:14) (65 - 87)  BP: 108/71 (17 Jun 2025 04:14) (101/64 - 108/71)  BP(mean): --  RR: 18 (17 Jun 2025 04:14) (18 - 18)  SpO2: 97% (17 Jun 2025 04:14) (96% - 100%)    Parameters below as of 17 Jun 2025 04:14  Patient On (Oxygen Delivery Method): room air        I&O's Summary    16 Jun 2025 07:01  -  17 Jun 2025 07:00  --------------------------------------------------------  IN: 0 mL / OUT: 800 mL / NET: -800 mL        Telemetry past 24h: sr first deg avb    PHYSICAL EXAM:    Constitutional: well-nourished, well-developed, NAD   HEENT:  MMM, sclerae anicteric, conjunctivae clear, no oral cyanosis.  Pulmonary: Non-labored, breath sounds are clear bilaterally, No wheezing, rales or rhonchi  Cardiovascular: Regular, S1 and S2.  soft sys murmur.  No rubs, gallops or clicks  Gastrointestinal: Bowel Sounds present, soft, nontender.   Lymph: No peripheral edema.   Neurological: Alert, no focal deficits  Skin: No rashes.  Psych:  Mood & affect appropriate    LABS: All Labs Reviewed:                        13.0   8.26  )-----------( 210      ( 17 Jun 2025 04:20 )             42.3                         13.6   9.18  )-----------( 250      ( 16 Jun 2025 06:44 )             44.1                         12.9   13.05 )-----------( 218      ( 15 Christopher 2025 09:32 )             40.8     17 Jun 2025 04:20    137    |  102    |  15     ----------------------------<  205    4.1     |  23     |  0.70   16 Jun 2025 06:44    139    |  103    |  17     ----------------------------<  166    3.9     |  23     |  0.70   15 Christopher 2025 09:32    140    |  105    |  22     ----------------------------<  112    3.8     |  22     |  0.64     Ca    8.8        17 Jun 2025 04:20  Ca    9.1        16 Jun 2025 06:44  Ca    8.7        15 Christopher 2025 09:32  Phos  2.4       17 Jun 2025 04:20  Mg     2.0       17 Jun 2025 04:20      PT/INR - ( 17 Jun 2025 04:20 )   PT: 13.0 sec;   INR: 1.14 ratio         PTT - ( 17 Jun 2025 04:20 )  PTT:31.4 sec      Blood Culture: Organism --  Gram Stain Blood -- Gram Stain --  Specimen Source Blood Blood-Peripheral  Culture-Blood --    Organism --  Gram Stain Blood -- Gram Stain --  Specimen Source Blood Blood-Peripheral  Culture-Blood --           RADIOLOGY:    EKG:    Echo:

## 2025-06-17 NOTE — PROGRESS NOTE ADULT - PROBLEM SELECTOR PLAN 5
Continue home mexiletine.  restarted eliquis - hemoptysis improved        needs Genetic testing per Cardiology, d/w Erick Romero

## 2025-06-17 NOTE — PRE-ANESTHESIA EVALUATION ADULT - NSRADCARDRESULTSFT_GEN_ALL_CORE
< from: TTE W or WO Ultrasound Enhancing Agent (06.11.25 @ 11:42) >    CONCLUSIONS:      1. Left ventricular systolic function is hyperdynamic with an ejection fraction of 81 % by Felipe's method of disks. There are no regional wall motion abnormalities seen.   2. Mild left ventricular hypertrophy.   3. There is moderate (grade 2) left ventricular diastolic dysfunction.   4. Enlarged right ventricular cavity size and mild to moderately reduced right ventricular systolic function. Tricuspid annular plane systolic excursion (TAPSE) is 1.1 cm (normal >=1.7 cm). Tricuspid annular tissue Doppler S' is 10.0 cm/s (normal >10 cm/s).   5. Moderate mitral regurgitation.   6. Estimated pulmonary artery systolic pressure is 46 mmHg.   7. No significant valvular disease.   8. The inferior vena cava is normal in size (normal <2.1cm) with normal inspiratory collapse (normal >50%) consistent with normal right atrial pressure (~3, range 0-5mmHg).   9. Compared to the transthoracic echocardiogram performed on 7/3/2024, there have been no significant interval changes.    < end of copied text >

## 2025-06-17 NOTE — PROGRESS NOTE ADULT - SUBJECTIVE AND OBJECTIVE BOX
CHIEF COMPLAINT: sob    Interval Events: Patient seen and examined at bedside. No acute events overnight. Patient reports she feels well today, ready for her procedure.     REVIEW OF SYSTEMS:  Constitutional: [ X] negative [ ] fevers [ ] chills [ ] weight loss [ ] weight gain  HEENT: [X ] negative [ ] dry eyes [ ] eye irritation [ ] postnasal drip [ ] nasal congestion  CV: [ X] negative  [ ] chest pain [ ] orthopnea [ ] palpitations [ ] murmur  Resp: [X ] negative [ ] cough [ ] shortness of breath [ ] dyspnea [ ] wheezing [ ] sputum [ ] hemoptysis  GI: [ x] negative [ ] nausea [ ] vomiting [ ] diarrhea [ ] constipation [ ] abd pain [ ] dysphagia   : [X ] negative [ ] dysuria [ ] nocturia [ ] hematuria [ ] increased urinary frequency  Musculoskeletal: [ ] negative [ ] back pain [ ] myalgias [ ] arthralgias [ ] fracture  Skin: [ ] negative [ ] rash [ ] itch  Neurological: [ ] negative [ ] headache [ ] dizziness [ ] syncope [ ] weakness [ ] numbness  Psychiatric: [ ] negative [ ] anxiety [ ] depression  Endocrine: [ ] negative [ ] diabetes [ ] thyroid problem  Hematologic/Lymphatic: [ ] negative [ ] anemia [ ] bleeding problem  Allergic/Immunologic: [ ] negative [ ] itchy eyes [ ] nasal discharge [ ] hives [ ] angioedema  [ ] All other systems negative  [ ] Unable to assess ROS because ________      OBJECTIVE:  ICU Vital Signs Last 24 Hrs  T(C): 36.7 (17 Jun 2025 11:03), Max: 37.1 (16 Jun 2025 11:53)  T(F): 98.1 (17 Jun 2025 10:22), Max: 98.7 (16 Jun 2025 11:53)  HR: 80 (17 Jun 2025 11:03) (65 - 87)  BP: 129/78 (17 Jun 2025 11:03) (101/64 - 129/78)  BP(mean): --  ABP: --  ABP(mean): --  RR: 18 (17 Jun 2025 11:03) (18 - 18)  SpO2: 99% (17 Jun 2025 11:03) (96% - 100%)    O2 Parameters below as of 17 Jun 2025 11:03    O2 Flow (L/min): 2            06-16 @ 07:01  -  06-17 @ 07:00  --------------------------------------------------------  IN: 0 mL / OUT: 800 mL / NET: -800 mL      CAPILLARY BLOOD GLUCOSE      POCT Blood Glucose.: 232 mg/dL (17 Jun 2025 06:14)      PHYSICAL EXAM:  General: well appearing female, NAD   Neck: Supple, NO JVD  Cardiac: RRR,  S1/ S2, No murmurs, rubs, gallops  Pulmonary: CTABL, Breathing unlabored, No Rhonchi/Rales/Wheezing  Abdomen: Soft, non tender, non distended, +BS  Extremities: No Rashes, No pedal edema, no TTP on calfs  Neuro: A/o x 3, No focal deficits  Psch: normal mood , normal affect      HOSPITAL MEDICATIONS:  apixaban 5 milliGRAM(s) Oral two times a day    vancomycin  IVPB 750 milliGRAM(s) IV Intermittent every 12 hours    mexiletine 200 milliGRAM(s) Oral three times a day  NIFEdipine XL 60 milliGRAM(s) Oral daily    dextrose 50% Injectable 12.5 Gram(s) IV Push once  dextrose 50% Injectable 25 Gram(s) IV Push once  dextrose 50% Injectable 25 Gram(s) IV Push once  dextrose Oral Gel 15 Gram(s) Oral once PRN  glucagon  Injectable 1 milliGRAM(s) IntraMuscular once  insulin glargine Injectable (LANTUS) 20 Unit(s) SubCutaneous at bedtime  insulin lispro (ADMELOG) corrective regimen sliding scale   SubCutaneous every 6 hours  insulin lispro Injectable (ADMELOG) 4 Unit(s) SubCutaneous before lunch  insulin lispro Injectable (ADMELOG) 4 Unit(s) SubCutaneous before dinner  insulin lispro Injectable (ADMELOG) 2 Unit(s) SubCutaneous before breakfast  methylPREDNISolone 8 milliGRAM(s) Oral daily  rosuvastatin 5 milliGRAM(s) Oral at bedtime    albuterol    0.083% 2.5 milliGRAM(s) Nebulizer every 6 hours  budesonide    Inhalation Suspension 1 milliGRAM(s) Inhalation two times a day  fluticasone propionate/ salmeterol 250-50 MICROgram(s) Diskus 1 Dose(s) Inhalation two times a day  formoterol for nebulization 20 MICROGram(s) Nebulizer every 12 hours  montelukast 10 milliGRAM(s) Oral at bedtime  sodium chloride 7% Inhalation 4 milliLiter(s) Inhalation every 12 hours  tiotropium 2.5 MICROgram(s) Inhaler 2 Puff(s) Inhalation daily    lacosamide 100 milliGRAM(s) Oral two times a day  levETIRAcetam 1000 milliGRAM(s) Oral two times a day  melatonin 3 milliGRAM(s) Oral at bedtime PRN  ondansetron Injectable 4 milliGRAM(s) IV Push every 8 hours PRN  sertraline 50 milliGRAM(s) Oral daily    famotidine    Tablet 20 milliGRAM(s) Oral daily  lactulose Syrup 10 Gram(s) Oral daily PRN  magnesium hydroxide Suspension 30 milliLiter(s) Oral daily PRN  mineral oil 30 milliLiter(s) Oral daily  pantoprazole    Tablet 40 milliGRAM(s) Oral before breakfast  polyethylene glycol 3350 17 Gram(s) Oral daily  senna 2 Tablet(s) Oral at bedtime        dextrose 5%. 1000 milliLiter(s) IV Continuous <Continuous>  dextrose 5%. 1000 milliLiter(s) IV Continuous <Continuous>  sodium chloride 0.9%. 1000 milliLiter(s) IV Continuous <Continuous>      artificial  tears Solution 1 Drop(s) Both EYES every 4 hours  chlorhexidine 2% Cloths 1 Application(s) Topical daily  silver sulfADIAZINE 1% Cream 1 Application(s) Topical daily    Ohtuvayre 3 mg/2.5 mL Inhalation 3 milliGRAM(s) 3 milliGRAM(s) Nebulizer every 12 hours      LABS:                        13.0   8.26  )-----------( 210      ( 17 Jun 2025 04:20 )             42.3     Hgb Trend: 13.0<--, 13.6<--, 12.9<--, 13.4<--, 12.5<--  06-17    137  |  102  |  15  ----------------------------<  205[H]  4.1   |  23  |  0.70    Ca    8.8      17 Jun 2025 04:20  Phos  2.4     06-17  Mg     2.0     06-17      Creatinine Trend: 0.70<--, 0.70<--, 0.64<--, 0.61<--, 0.59<--, 0.64<--  PT/INR - ( 17 Jun 2025 04:20 )   PT: 13.0 sec;   INR: 1.14 ratio         PTT - ( 17 Jun 2025 04:20 )  PTT:31.4 sec  Urinalysis Basic - ( 17 Jun 2025 04:20 )    Color: x / Appearance: x / SG: x / pH: x  Gluc: 205 mg/dL / Ketone: x  / Bili: x / Urobili: x   Blood: x / Protein: x / Nitrite: x   Leuk Esterase: x / RBC: x / WBC x   Sq Epi: x / Non Sq Epi: x / Bacteria: x            MICROBIOLOGY:     RADIOLOGY:  [ ] Reviewed and interpreted by me    PULMONARY FUNCTION TESTS:    EKG:

## 2025-06-17 NOTE — PROGRESS NOTE ADULT - ASSESSMENT
Shirley is a 73 yo F w/ asthma (chronic methypred 8mg PO daily), bronchiectasis, allergic rhinitis, recurrent PNA, SDB ( on CPAP), tracheomalacia s/p tracheoplasty, tracheobronchomalacia, pAfib (Eliquis), colorectal ca s/p colostomy, aortic dissection s/p ascending aorta repair who p/w cough, SOB and hemoptysis.     #SOB: Presented to OP Pulm office on 6/2 and those cultures were growing MRSA  Blood cx here positive for MRSA as well with clearance of blood cultures on 6/11  SOB thought to be 2/2 PNA/COPD exacerbation and pt feels markedly improved after treatment. Remains on RA this AM  Inhalers as per pulm   Steroids being decreased back to home dose     #MRSA bacteremia: Blood cx as noted above. Repeat cultures on 6/11 negative  However, given she has a bioprosthetic valve, a HIRA indicated to ensure her infection has not seeded her valve  cleared by ent  For HIRA today    #PVCs: On chronic mexiletine therapy, continue   - Tele monitoring     #Dissection s/p repair: type A dissection s/p bioAVR and Bental in 2022 then severe AS s/p TAVR 9/2023:  - On Labetalol at home, currently on hold   -bp well controlled on present meds including nifedipine  -noting hx of aortic dissection would consider resuming bb tomorrow, after hira, either labetolol if bp will support, or metoprolol

## 2025-06-17 NOTE — PROGRESS NOTE ADULT - SUBJECTIVE AND OBJECTIVE BOX
[facility] Division of Hospital Medicine  Kacie Meek MD  MS Teams PREFERRED        SUBJECTIVE / OVERNIGHT EVENTS: Seen at bedside. NAD.    MEDICATIONS  (STANDING):  albuterol    0.083% 2.5 milliGRAM(s) Nebulizer every 6 hours  apixaban 5 milliGRAM(s) Oral two times a day  artificial  tears Solution 1 Drop(s) Both EYES every 4 hours  budesonide    Inhalation Suspension 1 milliGRAM(s) Inhalation two times a day  chlorhexidine 2% Cloths 1 Application(s) Topical daily  dextrose 5%. 1000 milliLiter(s) (100 mL/Hr) IV Continuous <Continuous>  dextrose 5%. 1000 milliLiter(s) (50 mL/Hr) IV Continuous <Continuous>  dextrose 50% Injectable 25 Gram(s) IV Push once  dextrose 50% Injectable 12.5 Gram(s) IV Push once  dextrose 50% Injectable 25 Gram(s) IV Push once  famotidine    Tablet 20 milliGRAM(s) Oral daily  fluticasone propionate/ salmeterol 250-50 MICROgram(s) Diskus 1 Dose(s) Inhalation two times a day  formoterol for nebulization 20 MICROGram(s) Nebulizer every 12 hours  glucagon  Injectable 1 milliGRAM(s) IntraMuscular once  insulin glargine Injectable (LANTUS) 20 Unit(s) SubCutaneous at bedtime  insulin lispro (ADMELOG) corrective regimen sliding scale   SubCutaneous every 6 hours  insulin lispro Injectable (ADMELOG) 4 Unit(s) SubCutaneous before lunch  insulin lispro Injectable (ADMELOG) 4 Unit(s) SubCutaneous before dinner  insulin lispro Injectable (ADMELOG) 2 Unit(s) SubCutaneous before breakfast  lacosamide 100 milliGRAM(s) Oral two times a day  lactated ringers. 1000 milliLiter(s) (75 mL/Hr) IV Continuous <Continuous>  levETIRAcetam 1000 milliGRAM(s) Oral two times a day  methylPREDNISolone 8 milliGRAM(s) Oral daily  mexiletine 200 milliGRAM(s) Oral three times a day  mineral oil 30 milliLiter(s) Oral daily  montelukast 10 milliGRAM(s) Oral at bedtime  NIFEdipine XL 60 milliGRAM(s) Oral daily  Ohtuvayre 3 mg/2.5 mL Inhalation 3 milliGRAM(s) 3 milliGRAM(s) Nebulizer every 12 hours  pantoprazole    Tablet 40 milliGRAM(s) Oral before breakfast  polyethylene glycol 3350 17 Gram(s) Oral daily  potassium phosphate / sodium phosphate Powder (PHOS-NaK) 1 Packet(s) Oral once  rosuvastatin 5 milliGRAM(s) Oral at bedtime  senna 2 Tablet(s) Oral at bedtime  sertraline 50 milliGRAM(s) Oral daily  silver sulfADIAZINE 1% Cream 1 Application(s) Topical daily  sodium chloride 0.9%. 1000 milliLiter(s) (75 mL/Hr) IV Continuous <Continuous>  sodium chloride 7% Inhalation 4 milliLiter(s) Inhalation every 12 hours  tiotropium 2.5 MICROgram(s) Inhaler 2 Puff(s) Inhalation daily  vancomycin  IVPB 750 milliGRAM(s) IV Intermittent every 12 hours    MEDICATIONS  (PRN):  dextrose Oral Gel 15 Gram(s) Oral once PRN Blood Glucose LESS THAN 70 milliGRAM(s)/deciliter  lactulose Syrup 10 Gram(s) Oral daily PRN constipation  magnesium hydroxide Suspension 30 milliLiter(s) Oral daily PRN Constipation  melatonin 3 milliGRAM(s) Oral at bedtime PRN Insomnia  ondansetron Injectable 4 milliGRAM(s) IV Push every 8 hours PRN Nausea and/or Vomiting      I&O's Summary    16 Jun 2025 07:01  -  17 Jun 2025 07:00  --------------------------------------------------------  IN: 0 mL / OUT: 800 mL / NET: -800 mL        PHYSICAL EXAM:  Vital Signs Last 24 Hrs  T(C): 36.4 (17 Jun 2025 12:10), Max: 37.1 (16 Jun 2025 20:48)  T(F): 97.5 (17 Jun 2025 12:10), Max: 98.7 (16 Jun 2025 20:48)  HR: 53 (17 Jun 2025 13:00) (53 - 87)  BP: 111/55 (17 Jun 2025 13:00) (101/64 - 134/64)  BP(mean): 76 (17 Jun 2025 13:00) (76 - 92)  RR: 16 (17 Jun 2025 13:00) (16 - 18)  SpO2: 100% (17 Jun 2025 13:00) (96% - 100%)    Parameters below as of 17 Jun 2025 13:00  Patient On (Oxygen Delivery Method): room air      GENERAL: NAD, breathing not labored   EYES: conjunctiva and sclera clear  NECK: supple, No JVD  CHEST/LUNG: b/l rhonchi (improved)  HEART: S1 S2 RRR  ABDOMEN: +BS Soft, NT/ND  EXTREMITIES:  2+ DP Pulses, No c/c. no LE edema  NEUROLOGY: AAOx3, no facial droop, moving all extremities     LABS:                        13.0   8.26  )-----------( 210      ( 17 Jun 2025 04:20 )             42.3     06-17    137  |  102  |  15  ----------------------------<  205[H]  4.1   |  23  |  0.70    Ca    8.8      17 Jun 2025 04:20  Phos  2.4     06-17  Mg     2.0     06-17      PT/INR - ( 17 Jun 2025 04:20 )   PT: 13.0 sec;   INR: 1.14 ratio         PTT - ( 17 Jun 2025 04:20 )  PTT:31.4 sec      Urinalysis Basic - ( 17 Jun 2025 04:20 )    Color: x / Appearance: x / SG: x / pH: x  Gluc: 205 mg/dL / Ketone: x  / Bili: x / Urobili: x   Blood: x / Protein: x / Nitrite: x   Leuk Esterase: x / RBC: x / WBC x   Sq Epi: x / Non Sq Epi: x / Bacteria: x

## 2025-06-17 NOTE — PROGRESS NOTE ADULT - PROBLEM SELECTOR PLAN 1
--Met SIRS with leukocytosis and tachpnyea (RR 22) on initial presentation. Present on admission.   --Multifocal Pneumonia on Imaging.   --Bacteremia- MRSA 3/4 bottles, repeat 6/11 1/4+GPC in clusters, repeat bx q48 hours     TTE without endocarditis - SAFIA done today, f/u results  monitor repeat blood cultures > NGTD f/u with final results   c/w vancomycin - monitor trough, dosing adjusted per ID - f/u regarding duration of antibiotics  ID and pulmonology following  Patient with many antibiotic allergies

## 2025-06-17 NOTE — PROGRESS NOTE ADULT - ASSESSMENT
74F complex PMHx with Epilepsy on Keppra, COPD, asthma (on CPAP and VATS at home, on chronic steroids), DM2, bronchiectasis, tracheomalacia s/p tracheloplasty, HTN, Hx of dissection of descending thoracic aorta, hx of aortic aneurysm, Type B dissection (7/2024), medically managed initially as she did not meet criteria for surgery at that time, evaluated by Dr. 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 Patient reporting worsening cough and SOB over last 2-3 days. Patient recently dx with MRSA on sputum culture, started treatment on Bactrim. Reports she recently started Bactrim ds 1 BID x 10 days (6/5/2025)    Afebrile here in the ED. WBC on admission elevated to 25K. CXR on admission showing Right patchy airspace opacity may reflect pneumonia. CT chest non contrast - Multifocal pneumonia. Found to have MRSA bacteremia. Has a TAVR in place (severe AS s/p TAVR (9/10/2023)),  Has Bilateral TKA (20 years ago), plate and screws across pubic symphysis.     MRI lumbar spine 6/11/2025 - no evidence of discitis-osteomyelitis, epidural or paraspinal abscess.  TTE on 06/11- TAVR, There is trace paravalvular regurgitation. Moderate MR. Compared to the transthoracic echocardiogram performed on 7/3/2024, there have been no significant interval changes.  CTAP 06/12- Patchy cortical hypoenhancement in the bilateral renal lower poles, possibly pyelonephritis. UA on admission was negative, No abscess.  SAFIA today with extension of aortic dissection, TAVR appeared okay, no abscess, severe MR, no vegetations seen    # High grade MRSA bacteremia (left leg open wound likely source vs PNA)  # Multifocal PNA  # Recent MRSA PNA  # Left leg open wound   # S/P bioAVR / Bental 2022 and TAVR in 2023   # S/P internal fixation of remote fractures of the superior pubic rami and pubic symphysis  # Bilateral TKA - 20 years ago   # Leucocytosis   # Immunocompromised host   # severe persistent asthma-steroid dependent  # bronchiectasis/COPD-   # H/o multiple infections   # Chronic cough   # H/O multiple antibiotic allergies   # Elevated inflammatory markers     MICRO:   06/09 Bcx- MRSA ( 3/4) ( S- GARRETT: 1- Vancomycin)   06/09 Sputum cx-  MRSA  06/09 Urine cx- Negative   06/09 Urine strep and urine legionella- Negative   06/11 Bcx- 1/4 MRSA  6/13 BC x2 NGTD    ABX;   Vancomycin 06/09-- current     Recommendations:   - TTE on 06/11- TAVR, There is trace paravalvular regurgitation. Moderate MR.   - SAFIA noted, consider cardiac surgical evaluation  - f/u all cultures  - Vancomycin 750 mg Q12H  - monitor vancomycin levels

## 2025-06-17 NOTE — PROGRESS NOTE ADULT - SUBJECTIVE AND OBJECTIVE BOX
Follow Up:  MRSA bacteremic pneumonia    Interval History/ROS:  no fever.  feels okay.  ROS otherwise negative.    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  H/O pelvic surgery 5 years ago - s/p fracture  History of tracheomalacia 2015 - attempted tracheal stenting ([facility])- course complicated by obstruction, respiratory failure, multiple CPR attempts -  stent discontinued; 10/20/2016 Tracheobronchoplasty (Prolene Mesh) performed at [facility] by Dr Zapien  S/P bronchoscopy 6/5/2018 - [place] (Dr Zapien) no evidence of tracheobronchomalacia in trachea or bronchial tubes  Rectal bleeding exam under anesthesia ([facility]) 2/2018  S/P TAVR (transcatheter aortic valve replacement)  S/P colostomy  S/P AVR (aortic valve replacement)    Allergies  Valium (Short breath)  OxyContin (Unknown)  Valium (Unknown)  Avelox (Short breath; Pruritus)  penicillin (Anaphylaxis)  Dilaudid (Short breath)  shellfish (Anaphylaxis)  ampicillin (Unknown)  codeine (Short breath)  Percocet (Unknown)  tetanus toxoid (Short breath)  cefepime (Anaphylaxis)  cefepime (Short breath (Severe))  codeine (Unknown)  iodine (Short breath; Swelling)  aspirin (Short breath)  meropenem (Unknown)    ANTIMICROBIALS:    vancomycin  IVPB 750 every 12 hours    vancomycin  IVPB 750 every 12 hours      MEDICATIONS  (STANDING):  albuterol    0.083% 2.5 every 6 hours  apixaban 5 two times a day  budesonide    Inhalation Suspension 1 two times a day  famotidine    Tablet 20 daily  fluticasone propionate/ salmeterol 250-50 MICROgram(s) Diskus 1 two times a day  formoterol for nebulization 20 every 12 hours  insulin glargine Injectable (LANTUS) 20 at bedtime  insulin lispro (ADMELOG) corrective regimen sliding scale  every 6 hours  insulin lispro Injectable (ADMELOG) 4 before lunch  insulin lispro Injectable (ADMELOG) 4 before dinner  insulin lispro Injectable (ADMELOG) 2 before breakfast  lacosamide 100 two times a day  levETIRAcetam 1000 two times a day  methylPREDNISolone 8 daily  mexiletine 200 three times a day  mineral oil 30 daily  montelukast 10 at bedtime  NIFEdipine XL 60 daily  pantoprazole    Tablet 40 before breakfast  polyethylene glycol 3350 17 daily  rosuvastatin 5 at bedtime  senna 2 at bedtime  sertraline 50 daily  sodium chloride 7% Inhalation 4 every 12 hours  tiotropium 2.5 MICROgram(s) Inhaler 2 daily    Vital Signs Last 24 Hrs  T(F): 97.5 (06-17-25 @ 12:10), Max: 98.7 (06-16-25 @ 20:48)  HR: 53 (06-17-25 @ 13:00)  BP: 111/55 (06-17-25 @ 13:00)  RR: 16 (06-17-25 @ 13:00)  SpO2: 100% (06-17-25 @ 13:00) (96% - 100%)  Wt(kg): --    PHYSICAL EXAM:  Constitutional: non-toxic  HEAD/EYES: anicteric  ENT:  supple  Cardiovascular:   normal S1, S2  Respiratory:  clear BS bilaterally  GI:  soft, non-tender, normal bowel sounds  :  no ramirez  Musculoskeletal:  no synovitis  Neurologic: awake and alert, normal strength, no focal findings  Skin:  no rash  Psychiatric:  awake, alert, appropriate mood                             13.0   8.26  )-----------( 210      ( 17 Jun 2025 04:20 )             42.3 06-17    137  |  102  |  15  ----------------------------<  205  4.1   |  23  |  0.70  Ca    8.8      17 Jun 2025 04:20Phos  2.4     06-17Mg     2.0     06-17    MICROBIOLOGY:  Vancomycin Level, Trough: 11.1 (06-16 @ 06:44)  Vancomycin Level, Trough: 10.9 (06-15 @ 09:32)  Vancomycin Level, Trough: 10.5 (06-13 @ 12:23)  Vancomycin Level, Trough: 4.5 (06-11 @ 08:24)    6/13 BC (-)  6/11 BC (+) MRSA  6/10 Sputum cx (+) MRSA  6/9 UC (-)  6/9 BC (+) MRSA  6/9 Sputum cx (+) MRSA    RADIOLOGY:  below radiology personally reviewed and agree with finding    CT Abdomen and Pelvis w/ IV Cont (06.12.25 @ 15:12) >  IMPRESSION:  Patchy cortical hypoenhancement in the bilateral renal lower poles, possibly pyelonephritis.  No abscess.    MR Lumbar Spine w/wo IV Cont (06.11.25 @ 10:44) >  IMPRESSION:  1. No evidence of discitis-osteomyelitis, epidural or paraspinal abscess.  2. Stable grade 1 anterolisthesis L3 over L4 with severe bilateral L3-L4 facet arthrosis.  3. L3-L4 anterolisthesis with a stable left foraminal-lateral disc protrusion superimposed upon a disc bulge, resulting in moderate central canal and mild left neural foramen stenosis with facet arthrosis and ligamentum flavum hypertrophy, crowding the nerve roots of the cauda equina. Left foraminal-lateral annular tear.  4. L4-L5 stable right foraminal-lateral disc protrusion superimposed upon a disc bulge, resulting in mild central canal, bilateral lateral recess, moderate right and mild left neural foramen stenosis with facet arthrosis and ligamentum flavum hypertrophy, contacting the right L4 and L5 nerve   roots.  5. L5-S1 stable disc bulge-spondylitic ridge complex, resulting in mild central canal and severe bilateral neural foramen stenosis with facet arthrosis, impinging upon bilateral L5 nerve roots.  6. Additional findings, including those degenerative, described in detail above.    CT Chest No Cont (06.09.25 @ 15:14) >  IMPRESSION:  Multifocal pneumonia. Recommend follow-up chest CT in 3 months to ensure clearance.        ECHO:  SAFIA W or WO Ultrasound Enhancing Agent (06.17.25 @ 11:11) >  CONCLUSIONS:   1. Left ventricular systolic function is normal with an ejection fraction visually estimated at 65 to 70 %.   2. Normal right ventricular cavity size and normal right ventricular systolic function.   3. No echocardiographic evidence of vegetations.   4. Prolapse of the anterior mitral leaflet.   5. Severe mitral regurgitation. The mitral regurgitant jet is posteriorly directed. Mechanism of mitral regurgitation: Porsha Type II (excessive leaflet motion with prolapse). Systolic flow reversal in the pulmonary veins, which is consistent with severe mitral regurgitation.   6. A Transcatheter deployed (TAVR) valve replacement is present in the aortic position TAVR valve The prosthetic valve has normal leaflet excursion . No prosthetic aortic stenosis. No intravalvular regurgitation No paravalvular regurgitation.   7. TAVR valve well seated, no evidence of vegetations or abscess.   8. Known type A chronic aortic dissection is seen.   9. Aortic dissection seen at the a dissection flap with a false lumen is present in the descending aorta that extends to the aortic arch.  10. No evidence of left atrial or left atrial appendage thrombus.  11. Compared to the transesophageal echocardiogram performed on 8/30/2023, the aortic dissectionapears to have extended.    TTE W or WO Ultrasound Enhancing Agent (06.11.25 @ 11:42) >  CONCLUSIONS:   1. Left ventricular systolic function is hyperdynamic with an ejection fraction of 81 % by Felipe's method of disks. There are no regional wall motion abnormalities seen.   2. Mild left ventricular hypertrophy.   3. There is moderate (grade 2) left ventricular diastolic dysfunction.   4. Enlarged right ventricular cavity size and mild to moderately reduced right ventricular systolic function. Tricuspid annular plane systolic excursion (TAPSE) is 1.1 cm (normal >=1.7 cm). Tricuspid annular tissue Doppler S' is 10.0 cm/s (normal >10 cm/s).   5. Moderate mitral regurgitation.   6. Estimated pulmonary artery systolic pressure is 46 mmHg.   7. No significant valvular disease.   8. The inferior vena cava is normal in size (normal <2.1cm) with normal inspiratory collapse (normal >50%) consistent with normal right atrial pressure (~3, range 0-5mmHg).   9. Compared to the transthoracic echocardiogram performed on 7/3/2024, there have been no significant interval changes.

## 2025-06-18 DIAGNOSIS — I34.0 NONRHEUMATIC MITRAL (VALVE) INSUFFICIENCY: ICD-10-CM

## 2025-06-18 LAB
ANION GAP SERPL CALC-SCNC: 14 MMOL/L — SIGNIFICANT CHANGE UP (ref 5–17)
BUN SERPL-MCNC: 16 MG/DL — SIGNIFICANT CHANGE UP (ref 7–23)
CALCIUM SERPL-MCNC: 8.9 MG/DL — SIGNIFICANT CHANGE UP (ref 8.4–10.5)
CHLORIDE SERPL-SCNC: 104 MMOL/L — SIGNIFICANT CHANGE UP (ref 96–108)
CO2 SERPL-SCNC: 23 MMOL/L — SIGNIFICANT CHANGE UP (ref 22–31)
CREAT SERPL-MCNC: 0.61 MG/DL — SIGNIFICANT CHANGE UP (ref 0.5–1.3)
CULTURE RESULTS: SIGNIFICANT CHANGE UP
CULTURE RESULTS: SIGNIFICANT CHANGE UP
EGFR: 93 ML/MIN/1.73M2 — SIGNIFICANT CHANGE UP
EGFR: 93 ML/MIN/1.73M2 — SIGNIFICANT CHANGE UP
GLUCOSE BLDC GLUCOMTR-MCNC: 129 MG/DL — HIGH (ref 70–99)
GLUCOSE BLDC GLUCOMTR-MCNC: 149 MG/DL — HIGH (ref 70–99)
GLUCOSE BLDC GLUCOMTR-MCNC: 155 MG/DL — HIGH (ref 70–99)
GLUCOSE BLDC GLUCOMTR-MCNC: 200 MG/DL — HIGH (ref 70–99)
GLUCOSE SERPL-MCNC: 153 MG/DL — HIGH (ref 70–99)
PHOSPHATE SERPL-MCNC: 2.8 MG/DL — SIGNIFICANT CHANGE UP (ref 2.5–4.5)
POTASSIUM SERPL-MCNC: 4.2 MMOL/L — SIGNIFICANT CHANGE UP (ref 3.5–5.3)
POTASSIUM SERPL-SCNC: 4.2 MMOL/L — SIGNIFICANT CHANGE UP (ref 3.5–5.3)
SODIUM SERPL-SCNC: 141 MMOL/L — SIGNIFICANT CHANGE UP (ref 135–145)
SPECIMEN SOURCE: SIGNIFICANT CHANGE UP
SPECIMEN SOURCE: SIGNIFICANT CHANGE UP

## 2025-06-18 PROCEDURE — 99233 SBSQ HOSP IP/OBS HIGH 50: CPT

## 2025-06-18 PROCEDURE — 99232 SBSQ HOSP IP/OBS MODERATE 35: CPT

## 2025-06-18 PROCEDURE — 93010 ELECTROCARDIOGRAM REPORT: CPT

## 2025-06-18 PROCEDURE — G0545: CPT

## 2025-06-18 RX ORDER — MAGNESIUM, ALUMINUM HYDROXIDE 200-200 MG
30 TABLET,CHEWABLE ORAL EVERY 6 HOURS
Refills: 0 | Status: DISCONTINUED | OUTPATIENT
Start: 2025-06-18 | End: 2025-06-21

## 2025-06-18 RX ADMIN — INSULIN GLARGINE-YFGN 20 UNIT(S): 100 INJECTION, SOLUTION SUBCUTANEOUS at 22:11

## 2025-06-18 RX ADMIN — INSULIN LISPRO 1: 100 INJECTION, SOLUTION INTRAVENOUS; SUBCUTANEOUS at 22:11

## 2025-06-18 RX ADMIN — INSULIN LISPRO 4 UNIT(S): 100 INJECTION, SOLUTION INTRAVENOUS; SUBCUTANEOUS at 18:08

## 2025-06-18 RX ADMIN — FORMOTEROL FUMARATE DIHYDRATE 20 MICROGRAM(S): 20 SOLUTION RESPIRATORY (INHALATION) at 18:11

## 2025-06-18 RX ADMIN — SERTRALINE 50 MILLIGRAM(S): 100 TABLET, FILM COATED ORAL at 11:43

## 2025-06-18 RX ADMIN — Medication 40 MILLIGRAM(S): at 06:28

## 2025-06-18 RX ADMIN — LACOSAMIDE 100 MILLIGRAM(S): 150 TABLET, FILM COATED ORAL at 18:09

## 2025-06-18 RX ADMIN — Medication 20 MILLIGRAM(S): at 11:44

## 2025-06-18 RX ADMIN — LEVETIRACETAM 1000 MILLIGRAM(S): 10 INJECTION, SOLUTION INTRAVENOUS at 18:09

## 2025-06-18 RX ADMIN — APIXABAN 5 MILLIGRAM(S): 2.5 TABLET, FILM COATED ORAL at 06:28

## 2025-06-18 RX ADMIN — Medication 1 APPLICATION(S): at 13:20

## 2025-06-18 RX ADMIN — APIXABAN 5 MILLIGRAM(S): 2.5 TABLET, FILM COATED ORAL at 18:09

## 2025-06-18 RX ADMIN — Medication 250 MILLIGRAM(S): at 18:09

## 2025-06-18 RX ADMIN — Medication 30 MILLILITER(S): at 15:00

## 2025-06-18 RX ADMIN — METHYLPREDNISOLONE ACETATE 8 MILLIGRAM(S): 80 INJECTION, SUSPENSION INTRA-ARTICULAR; INTRALESIONAL; INTRAMUSCULAR; SOFT TISSUE at 06:28

## 2025-06-18 RX ADMIN — MEXILETINE HYDROCHLORIDE 200 MILLIGRAM(S): 250 CAPSULE ORAL at 13:20

## 2025-06-18 RX ADMIN — ROSUVASTATIN CALCIUM 5 MILLIGRAM(S): 20 TABLET, FILM COATED ORAL at 22:11

## 2025-06-18 RX ADMIN — Medication 250 MILLIGRAM(S): at 06:29

## 2025-06-18 RX ADMIN — MONTELUKAST SODIUM 10 MILLIGRAM(S): 10 TABLET ORAL at 22:12

## 2025-06-18 RX ADMIN — POLYETHYLENE GLYCOL 3350 17 GRAM(S): 17 POWDER, FOR SOLUTION ORAL at 11:43

## 2025-06-18 RX ADMIN — LACOSAMIDE 100 MILLIGRAM(S): 150 TABLET, FILM COATED ORAL at 06:28

## 2025-06-18 RX ADMIN — Medication 2.5 MILLIGRAM(S): at 11:43

## 2025-06-18 RX ADMIN — TIOTROPIUM BROMIDE INHALATION SPRAY 2 PUFF(S): 3.12 SPRAY, METERED RESPIRATORY (INHALATION) at 11:43

## 2025-06-18 RX ADMIN — Medication 1 DROP(S): at 12:04

## 2025-06-18 RX ADMIN — Medication 1 DROP(S): at 06:29

## 2025-06-18 RX ADMIN — Medication 1 DROP(S): at 22:12

## 2025-06-18 RX ADMIN — INSULIN LISPRO 1: 100 INJECTION, SOLUTION INTRAVENOUS; SUBCUTANEOUS at 07:50

## 2025-06-18 RX ADMIN — Medication 1 DROP(S): at 18:28

## 2025-06-18 RX ADMIN — MEXILETINE HYDROCHLORIDE 200 MILLIGRAM(S): 250 CAPSULE ORAL at 06:28

## 2025-06-18 RX ADMIN — INSULIN LISPRO 2 UNIT(S): 100 INJECTION, SOLUTION INTRAVENOUS; SUBCUTANEOUS at 07:50

## 2025-06-18 RX ADMIN — MEXILETINE HYDROCHLORIDE 200 MILLIGRAM(S): 250 CAPSULE ORAL at 22:11

## 2025-06-18 RX ADMIN — Medication 30 MILLILITER(S): at 11:42

## 2025-06-18 RX ADMIN — BUDESONIDE 1 MILLIGRAM(S): 0.25 SUSPENSION RESPIRATORY (INHALATION) at 18:09

## 2025-06-18 RX ADMIN — Medication 2 TABLET(S): at 22:12

## 2025-06-18 RX ADMIN — Medication 2.5 MILLIGRAM(S): at 18:09

## 2025-06-18 RX ADMIN — LEVETIRACETAM 1000 MILLIGRAM(S): 10 INJECTION, SOLUTION INTRAVENOUS at 06:28

## 2025-06-18 RX ADMIN — Medication 1 DOSE(S): at 18:18

## 2025-06-18 RX ADMIN — Medication 60 MILLIGRAM(S): at 06:28

## 2025-06-18 RX ADMIN — Medication 1 APPLICATION(S): at 11:43

## 2025-06-18 RX ADMIN — INSULIN LISPRO 4 UNIT(S): 100 INJECTION, SOLUTION INTRAVENOUS; SUBCUTANEOUS at 11:44

## 2025-06-18 RX ADMIN — Medication 4 MILLILITER(S): at 18:10

## 2025-06-18 NOTE — PROGRESS NOTE ADULT - ASSESSMENT
74F w/ asthma (chronic methypred 8mg PO daily), bronchiectasis, allergic rhinitis, recurrent PNA, SDB ( on CPAP), tracheomalacia s/p tracheoplasty, tracheobronchomalacia, pAfib (Eliquis), colorectal ca s/p colostomy, aortic dissection s/p ascending aorta repair and prothestic aortic valve with severe AS s/p AVR, presents with cough, SOB and hemoptysis, found to have MRSA PNA and high grade bacteremial.     Home Airway clearance regimen: Albuterol q6h -> Chest Vest -> Perforomist BID  -> Pulmicort BID, - > Ohtuvayre 2.5 BID - > HyperSal 7% q12h. Patient also on Breo Ellipta 200 at 1 inhalation QD, Incruse Ellipta 1 puff QD, and on Tezspire airway     #MRSA PNA  #MRSA bacteremia  #Bronchiectasis and asthma exacerbation   #Hemoptysis  #SDB - on CPAP  - c/w Airway clearance regimen as possible Albuterol q6h -> Chest Vest -> Perforomist BID (patient brought in) -> Pulmicort BID, - > Ohtuvayre 2.5 BID - > HyperSal 7% q12h.  - c/w Eliquis for now, monitor for hemoptysis- no longer having for last 3 days  - Patient also on Breo Ellipta 200 at 1 inhalation QD, Incruse Ellipta 1 puff QD. Can use Advair/Spiriva while admitted  - Tezspire outpatient   - Agree with Vanc. f/u ID recs; SAFIA does not show evidence of endocarditis noted extension of chronic aortic dissection, follow up cards recs   - c/w home CPAP and Chest Vest for use  - c/w home dose methylpred 8mg daily    Discussed with patient and daughter importance of airway clearance and advised vest use at least 4x.day on discharge. No pulmonary contraindication to dc.

## 2025-06-18 NOTE — ADVANCED PRACTICE NURSE CONSULT - RECOMMEDATIONS
· Recommendations	  Will recommend:    1. Monitor output.  2. Empty pouch when 1/3-1/2 full.  3. Change pouching system 2x a week, every 3-4 days.  4. Contact ostomy specialists if questions/issues arise.  5. Supplies: Richar 2 1/4" Ceraplus flat skin barrier (#09396), [place] 2 1/4" drainable pouch (#81767); Accessory products:  stoma paste #[id number], stoma powder (#0300) & Cavilon No sting barrier film wipe (#7127), stoma belt #8114

## 2025-06-18 NOTE — CONSULT NOTE ADULT - PROVIDER SPECIALTY LIST ADULT
Infectious Disease
Rx Refill Note  Requested Prescriptions     Pending Prescriptions Disp Refills    gabapentin (NEURONTIN) 400 MG capsule [Pharmacy Med Name: Gabapentin 400 MG Oral Capsule] 120 capsule 0     Sig: Take 2 capsules by mouth twice daily      Last office visit with prescribing clinician: 12/2/2024   Last telemedicine visit with prescribing clinician: Visit date not found   Next office visit with prescribing clinician: 1/2/2025                         Would you like a call back once the refill request has been completed: [] Yes [] No    If the office needs to give you a call back, can they leave a voicemail: [] Yes [] No    Mena Hernandez RN  12/30/24, 07:07 CST    
Neurosurgery
Palliative Care
Wound Care
Cardiology
ENT
Structural Heart
Pulmonology

## 2025-06-18 NOTE — PROGRESS NOTE ADULT - SUBJECTIVE AND OBJECTIVE BOX
CHIEF COMPLAINT: sob    Interval Events: Patient seen and examined at bedside. No acute events overnight. Patient reports she feels great and is ready to go home    REVIEW OF SYSTEMS:  Constitutional: [ X] negative [ ] fevers [ ] chills [ ] weight loss [ ] weight gain  HEENT: [X ] negative [ ] dry eyes [ ] eye irritation [ ] postnasal drip [ ] nasal congestion  CV: [X ] negative  [ ] chest pain [ ] orthopnea [ ] palpitations [ ] murmur  Resp: [ X] negative [ ] cough [ ] shortness of breath [ ] dyspnea [ ] wheezing [ ] sputum [ ] hemoptysis  GI: [X ] negative [ ] nausea [ ] vomiting [ ] diarrhea [ ] constipation [ ] abd pain [ ] dysphagia   : [ ] negative [ ] dysuria [ ] nocturia [ ] hematuria [ ] increased urinary frequency  Musculoskeletal: [ ] negative [ ] back pain [ ] myalgias [ ] arthralgias [ ] fracture  Skin: [ ] negative [ ] rash [ ] itch  Neurological: [ ] negative [ ] headache [ ] dizziness [ ] syncope [ ] weakness [ ] numbness  Psychiatric: [ ] negative [ ] anxiety [ ] depression  Endocrine: [ ] negative [ ] diabetes [ ] thyroid problem  Hematologic/Lymphatic: [ ] negative [ ] anemia [ ] bleeding problem  Allergic/Immunologic: [ ] negative [ ] itchy eyes [ ] nasal discharge [ ] hives [ ] angioedema  [ ] All other systems negative  [ ] Unable to assess ROS because ________    OBJECTIVE:  ICU Vital Signs Last 24 Hrs  T(C): 36.7 (18 Jun 2025 11:14), Max: 37 (17 Jun 2025 20:46)  T(F): 98.1 (18 Jun 2025 11:14), Max: 98.6 (17 Jun 2025 20:46)  HR: 76 (18 Jun 2025 11:14) (53 - 77)  BP: 123/75 (18 Jun 2025 11:14) (101/67 - 134/64)  BP(mean): 92 (17 Jun 2025 14:00) (76 - 92)  ABP: --  ABP(mean): --  RR: 18 (18 Jun 2025 11:14) (14 - 18)  SpO2: 97% (18 Jun 2025 11:14) (96% - 100%)    O2 Parameters below as of 18 Jun 2025 11:14  Patient On (Oxygen Delivery Method): room air              06-17 @ 07:01  -  06-18 @ 07:00  --------------------------------------------------------  IN: 0 mL / OUT: 1150 mL / NET: -1150 mL      CAPILLARY BLOOD GLUCOSE      POCT Blood Glucose.: 155 mg/dL (18 Jun 2025 07:30)      PHYSICAL EXAM:  General: well appearing female, NAD   Neck: Supple, NO JVD  Cardiac: RRR,  S1/ S2, No murmurs, rubs, gallops  Pulmonary: scattered rhonchi anteriorly, Breathing unlabored, No Rhonchi/Rales/Wheezing  Abdomen: Soft, non tender, non distended, +BS  Extremities: No Rashes, No pedal edema, no TTP on calfs  Neuro: A/o x 3, No focal deficits  Psch: normal mood , normal affect    HOSPITAL MEDICATIONS:  apixaban 5 milliGRAM(s) Oral two times a day    vancomycin  IVPB 750 milliGRAM(s) IV Intermittent every 12 hours    mexiletine 200 milliGRAM(s) Oral three times a day  NIFEdipine XL 60 milliGRAM(s) Oral daily    dextrose 50% Injectable 25 Gram(s) IV Push once  dextrose 50% Injectable 12.5 Gram(s) IV Push once  dextrose 50% Injectable 25 Gram(s) IV Push once  dextrose Oral Gel 15 Gram(s) Oral once PRN  glucagon  Injectable 1 milliGRAM(s) IntraMuscular once  insulin glargine Injectable (LANTUS) 20 Unit(s) SubCutaneous at bedtime  insulin lispro (ADMELOG) corrective regimen sliding scale   SubCutaneous Before meals and at bedtime  insulin lispro Injectable (ADMELOG) 2 Unit(s) SubCutaneous before breakfast  insulin lispro Injectable (ADMELOG) 4 Unit(s) SubCutaneous before lunch  insulin lispro Injectable (ADMELOG) 4 Unit(s) SubCutaneous before dinner  methylPREDNISolone 8 milliGRAM(s) Oral daily  rosuvastatin 5 milliGRAM(s) Oral at bedtime    albuterol    0.083% 2.5 milliGRAM(s) Nebulizer every 6 hours  budesonide    Inhalation Suspension 1 milliGRAM(s) Inhalation two times a day  fluticasone propionate/ salmeterol 250-50 MICROgram(s) Diskus 1 Dose(s) Inhalation two times a day  formoterol for nebulization 20 MICROGram(s) Nebulizer every 12 hours  montelukast 10 milliGRAM(s) Oral at bedtime  sodium chloride 7% Inhalation 4 milliLiter(s) Inhalation every 12 hours  tiotropium 2.5 MICROgram(s) Inhaler 2 Puff(s) Inhalation daily    lacosamide 100 milliGRAM(s) Oral two times a day  levETIRAcetam 1000 milliGRAM(s) Oral two times a day  melatonin 3 milliGRAM(s) Oral at bedtime PRN  ondansetron Injectable 4 milliGRAM(s) IV Push every 8 hours PRN  sertraline 50 milliGRAM(s) Oral daily    famotidine    Tablet 20 milliGRAM(s) Oral daily  lactulose Syrup 10 Gram(s) Oral daily PRN  magnesium hydroxide Suspension 30 milliLiter(s) Oral daily PRN  mineral oil 30 milliLiter(s) Oral daily  pantoprazole    Tablet 40 milliGRAM(s) Oral before breakfast  polyethylene glycol 3350 17 Gram(s) Oral daily  senna 2 Tablet(s) Oral at bedtime        dextrose 5%. 1000 milliLiter(s) IV Continuous <Continuous>  dextrose 5%. 1000 milliLiter(s) IV Continuous <Continuous>  sodium chloride 0.9%. 1000 milliLiter(s) IV Continuous <Continuous>      artificial  tears Solution 1 Drop(s) Both EYES every 4 hours  chlorhexidine 2% Cloths 1 Application(s) Topical daily  silver sulfADIAZINE 1% Cream 1 Application(s) Topical daily    Ohtuvayre 3 mg/2.5 mL Inhalation 3 milliGRAM(s) 3 milliGRAM(s) Nebulizer every 12 hours      LABS:                        13.0   8.26  )-----------( 210      ( 17 Jun 2025 04:20 )             42.3     Hgb Trend: 13.0<--, 13.6<--, 12.9<--, 13.4<--, 12.5<--  06-18    141  |  104  |  16  ----------------------------<  153[H]  4.2   |  23  |  0.61    Ca    8.9      18 Jun 2025 06:22  Phos  2.8     06-18  Mg     2.0     06-17      Creatinine Trend: 0.61<--, 0.70<--, 0.70<--, 0.64<--, 0.61<--, 0.59<--  PT/INR - ( 17 Jun 2025 04:20 )   PT: 13.0 sec;   INR: 1.14 ratio         PTT - ( 17 Jun 2025 04:20 )  PTT:31.4 sec  Urinalysis Basic - ( 18 Jun 2025 06:22 )    Color: x / Appearance: x / SG: x / pH: x  Gluc: 153 mg/dL / Ketone: x  / Bili: x / Urobili: x   Blood: x / Protein: x / Nitrite: x   Leuk Esterase: x / RBC: x / WBC x   Sq Epi: x / Non Sq Epi: x / Bacteria: x            MICROBIOLOGY:     RADIOLOGY:  [ ] Reviewed and interpreted by me    PULMONARY FUNCTION TESTS:    EKG:

## 2025-06-18 NOTE — CONSULT NOTE ADULT - ASSESSMENT
73 yo F with a PMH of Epilepsy on Keppra/lacosamide, COPD, asthma  (on CPAP at home, on chronic steroids), DM2, bronchiectasis, tracheomalacia s/p tracheloplasty, HTN, Hx of dissection of descending thoracic aorta, hx of aortic aneurysm, Type B dissection (7/2024), medically managed initially as she did not meet criteria for surgery at that time), evaluated by Dr. 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     Problem/Plan: Mitral Regurgitation:    SAFAI reviewed with Dr. Cabrera and Dr. Leahy. No signs of vegetation or abscess. Aortic dissection is chronic and stable, not warranting surgical repair. Her MR is now reported as severe. She is being treated for bacteremia and pneumonia, pt. reports she is awaiting PICC line placement for home antibiotics. Recommend that she return to see us in valve clinic upon completion of antibiotics with repeat TTE for reassessment of mitral regurgitation and discussion of possible treatment options.     LUISITO Shaw NP  [employer]

## 2025-06-18 NOTE — PROGRESS NOTE ADULT - SUBJECTIVE AND OBJECTIVE BOX
[facility] - Le Jung, Jim Pablo, Sridhar Newsome  Office Number:  [phone]    Patient resting comfortably in bed in NAD.  Laying flat with no respiratory distress.  No complaints of chest pain, dyspnea, palpitations, PND, or orthopnea.    ROS: negative unless otherwise mentioned.    Telemetry:      MEDICATIONS  (STANDING):  albuterol    0.083% 2.5 milliGRAM(s) Nebulizer every 6 hours  apixaban 5 milliGRAM(s) Oral two times a day  artificial  tears Solution 1 Drop(s) Both EYES every 4 hours  budesonide    Inhalation Suspension 1 milliGRAM(s) Inhalation two times a day  chlorhexidine 2% Cloths 1 Application(s) Topical daily  dextrose 5%. 1000 milliLiter(s) (50 mL/Hr) IV Continuous <Continuous>  dextrose 5%. 1000 milliLiter(s) (100 mL/Hr) IV Continuous <Continuous>  dextrose 50% Injectable 25 Gram(s) IV Push once  dextrose 50% Injectable 12.5 Gram(s) IV Push once  dextrose 50% Injectable 25 Gram(s) IV Push once  famotidine    Tablet 20 milliGRAM(s) Oral daily  fluticasone propionate/ salmeterol 250-50 MICROgram(s) Diskus 1 Dose(s) Inhalation two times a day  formoterol for nebulization 20 MICROGram(s) Nebulizer every 12 hours  glucagon  Injectable 1 milliGRAM(s) IntraMuscular once  insulin glargine Injectable (LANTUS) 20 Unit(s) SubCutaneous at bedtime  insulin lispro (ADMELOG) corrective regimen sliding scale   SubCutaneous Before meals and at bedtime  insulin lispro Injectable (ADMELOG) 2 Unit(s) SubCutaneous before breakfast  insulin lispro Injectable (ADMELOG) 4 Unit(s) SubCutaneous before lunch  insulin lispro Injectable (ADMELOG) 4 Unit(s) SubCutaneous before dinner  lacosamide 100 milliGRAM(s) Oral two times a day  levETIRAcetam 1000 milliGRAM(s) Oral two times a day  methylPREDNISolone 8 milliGRAM(s) Oral daily  mexiletine 200 milliGRAM(s) Oral three times a day  mineral oil 30 milliLiter(s) Oral daily  montelukast 10 milliGRAM(s) Oral at bedtime  NIFEdipine XL 60 milliGRAM(s) Oral daily  Ohtuvayre 3 mg/2.5 mL Inhalation 3 milliGRAM(s) 3 milliGRAM(s) Nebulizer every 12 hours  pantoprazole    Tablet 40 milliGRAM(s) Oral before breakfast  polyethylene glycol 3350 17 Gram(s) Oral daily  rosuvastatin 5 milliGRAM(s) Oral at bedtime  senna 2 Tablet(s) Oral at bedtime  sertraline 50 milliGRAM(s) Oral daily  silver sulfADIAZINE 1% Cream 1 Application(s) Topical daily  sodium chloride 0.9%. 1000 milliLiter(s) (75 mL/Hr) IV Continuous <Continuous>  sodium chloride 7% Inhalation 4 milliLiter(s) Inhalation every 12 hours  tiotropium 2.5 MICROgram(s) Inhaler 2 Puff(s) Inhalation daily  vancomycin  IVPB 750 milliGRAM(s) IV Intermittent every 12 hours    MEDICATIONS  (PRN):  dextrose Oral Gel 15 Gram(s) Oral once PRN Blood Glucose LESS THAN 70 milliGRAM(s)/deciliter  lactulose Syrup 10 Gram(s) Oral daily PRN constipation  magnesium hydroxide Suspension 30 milliLiter(s) Oral daily PRN Constipation  melatonin 3 milliGRAM(s) Oral at bedtime PRN Insomnia  ondansetron Injectable 4 milliGRAM(s) IV Push every 8 hours PRN Nausea and/or Vomiting      Allergies    Valium (Short breath)  OxyContin (Unknown)  Valium (Unknown)  Avelox (Short breath; Pruritus)  penicillin (Anaphylaxis)  Dilaudid (Short breath)  shellfish (Anaphylaxis)  ampicillin (Unknown)  codeine (Short breath)  Percocet (Unknown)  tetanus toxoid (Short breath)  cefepime (Anaphylaxis)  cefepime (Short breath (Severe))  codeine (Unknown)  iodine (Short breath; Swelling)  aspirin (Short breath)  meropenem (Unknown)    Intolerances        Vital Signs Last 24 Hrs  T(C): 36.7 (18 Jun 2025 05:44), Max: 37 (17 Jun 2025 20:46)  T(F): 98 (18 Jun 2025 05:44), Max: 98.6 (17 Jun 2025 20:46)  HR: 56 (18 Jun 2025 05:44) (53 - 80)  BP: 101/67 (18 Jun 2025 05:44) (101/67 - 134/64)  BP(mean): 92 (17 Jun 2025 14:00) (76 - 92)  RR: 18 (18 Jun 2025 05:44) (14 - 18)  SpO2: 98% (18 Jun 2025 05:44) (96% - 100%)    Parameters below as of 18 Jun 2025 05:44  Patient On (Oxygen Delivery Method): room air        I&O's Summary    17 Jun 2025 07:01  -  18 Jun 2025 07:00  --------------------------------------------------------  IN: 0 mL / OUT: 1150 mL / NET: -1150 mL        ON EXAM:    General: NAD, awake and alert, oriented x 3  HEENT: Mucous membranes are moist, anicteric  Lungs: Non-labored, breath sounds are clear bilaterally, No wheezing, rales or rhonchi  Cardiovascular: Regular, S1 and S2, no murmurs, rubs, or gallops  Gastrointestinal: Bowel Sounds present, soft, nontender.   Lymph: No peripheral edema. No lymphadenopathy.  Skin: No rashes or ulcers  Psych:  Mood & affect appropriate    LABS: All Labs Reviewed:                        13.0   8.26  )-----------( 210      ( 17 Jun 2025 04:20 )             42.3                         13.6   9.18  )-----------( 250      ( 16 Jun 2025 06:44 )             44.1                         12.9   13.05 )-----------( 218      ( 15 Christopher 2025 09:32 )             40.8     18 Jun 2025 06:22    141    |  104    |  16     ----------------------------<  153    4.2     |  23     |  0.61   17 Jun 2025 04:20    137    |  102    |  15     ----------------------------<  205    4.1     |  23     |  0.70   16 Jun 2025 06:44    139    |  103    |  17     ----------------------------<  166    3.9     |  23     |  0.70     Ca    8.9        18 Jun 2025 06:22  Ca    8.8        17 Jun 2025 04:20  Ca    9.1        16 Jun 2025 06:44  Phos  2.8       18 Jun 2025 06:22  Phos  2.4       17 Jun 2025 04:20  Mg     2.0       17 Jun 2025 04:20      PT/INR - ( 17 Jun 2025 04:20 )   PT: 13.0 sec;   INR: 1.14 ratio         PTT - ( 17 Jun 2025 04:20 )  PTT:31.4 sec      Blood Culture: Organism --  Gram Stain Blood -- Gram Stain --  Specimen Source Blood Blood-Peripheral  Culture-Blood --    Organism --  Gram Stain Blood -- Gram Stain --  Specimen Source Blood Blood-Peripheral  Culture-Blood --           [facility] - Le Jung, Jim Pablo, Sridhar Newsome  Office Number:  [phone]    Patient resting comfortably in bed in NAD.  Laying flat with no respiratory distress.    Denies any chest pain  Now having KRAMER and PND    ROS: negative unless otherwise mentioned.    Telemetry: SR     MEDICATIONS  (STANDING):  albuterol    0.083% 2.5 milliGRAM(s) Nebulizer every 6 hours  apixaban 5 milliGRAM(s) Oral two times a day  artificial  tears Solution 1 Drop(s) Both EYES every 4 hours  budesonide    Inhalation Suspension 1 milliGRAM(s) Inhalation two times a day  chlorhexidine 2% Cloths 1 Application(s) Topical daily  dextrose 5%. 1000 milliLiter(s) (50 mL/Hr) IV Continuous <Continuous>  dextrose 5%. 1000 milliLiter(s) (100 mL/Hr) IV Continuous <Continuous>  dextrose 50% Injectable 25 Gram(s) IV Push once  dextrose 50% Injectable 12.5 Gram(s) IV Push once  dextrose 50% Injectable 25 Gram(s) IV Push once  famotidine    Tablet 20 milliGRAM(s) Oral daily  fluticasone propionate/ salmeterol 250-50 MICROgram(s) Diskus 1 Dose(s) Inhalation two times a day  formoterol for nebulization 20 MICROGram(s) Nebulizer every 12 hours  glucagon  Injectable 1 milliGRAM(s) IntraMuscular once  insulin glargine Injectable (LANTUS) 20 Unit(s) SubCutaneous at bedtime  insulin lispro (ADMELOG) corrective regimen sliding scale   SubCutaneous Before meals and at bedtime  insulin lispro Injectable (ADMELOG) 2 Unit(s) SubCutaneous before breakfast  insulin lispro Injectable (ADMELOG) 4 Unit(s) SubCutaneous before lunch  insulin lispro Injectable (ADMELOG) 4 Unit(s) SubCutaneous before dinner  lacosamide 100 milliGRAM(s) Oral two times a day  levETIRAcetam 1000 milliGRAM(s) Oral two times a day  methylPREDNISolone 8 milliGRAM(s) Oral daily  mexiletine 200 milliGRAM(s) Oral three times a day  mineral oil 30 milliLiter(s) Oral daily  montelukast 10 milliGRAM(s) Oral at bedtime  NIFEdipine XL 60 milliGRAM(s) Oral daily  Ohtuvayre 3 mg/2.5 mL Inhalation 3 milliGRAM(s) 3 milliGRAM(s) Nebulizer every 12 hours  pantoprazole    Tablet 40 milliGRAM(s) Oral before breakfast  polyethylene glycol 3350 17 Gram(s) Oral daily  rosuvastatin 5 milliGRAM(s) Oral at bedtime  senna 2 Tablet(s) Oral at bedtime  sertraline 50 milliGRAM(s) Oral daily  silver sulfADIAZINE 1% Cream 1 Application(s) Topical daily  sodium chloride 0.9%. 1000 milliLiter(s) (75 mL/Hr) IV Continuous <Continuous>  sodium chloride 7% Inhalation 4 milliLiter(s) Inhalation every 12 hours  tiotropium 2.5 MICROgram(s) Inhaler 2 Puff(s) Inhalation daily  vancomycin  IVPB 750 milliGRAM(s) IV Intermittent every 12 hours    MEDICATIONS  (PRN):  dextrose Oral Gel 15 Gram(s) Oral once PRN Blood Glucose LESS THAN 70 milliGRAM(s)/deciliter  lactulose Syrup 10 Gram(s) Oral daily PRN constipation  magnesium hydroxide Suspension 30 milliLiter(s) Oral daily PRN Constipation  melatonin 3 milliGRAM(s) Oral at bedtime PRN Insomnia  ondansetron Injectable 4 milliGRAM(s) IV Push every 8 hours PRN Nausea and/or Vomiting      Allergies    Valium (Short breath)  OxyContin (Unknown)  Valium (Unknown)  Avelox (Short breath; Pruritus)  penicillin (Anaphylaxis)  Dilaudid (Short breath)  shellfish (Anaphylaxis)  ampicillin (Unknown)  codeine (Short breath)  Percocet (Unknown)  tetanus toxoid (Short breath)  cefepime (Anaphylaxis)  cefepime (Short breath (Severe))  codeine (Unknown)  iodine (Short breath; Swelling)  aspirin (Short breath)  meropenem (Unknown)    Intolerances        Vital Signs Last 24 Hrs  T(C): 36.7 (18 Jun 2025 05:44), Max: 37 (17 Jun 2025 20:46)  T(F): 98 (18 Jun 2025 05:44), Max: 98.6 (17 Jun 2025 20:46)  HR: 56 (18 Jun 2025 05:44) (53 - 80)  BP: 101/67 (18 Jun 2025 05:44) (101/67 - 134/64)  BP(mean): 92 (17 Jun 2025 14:00) (76 - 92)  RR: 18 (18 Jun 2025 05:44) (14 - 18)  SpO2: 98% (18 Jun 2025 05:44) (96% - 100%)    Parameters below as of 18 Jun 2025 05:44  Patient On (Oxygen Delivery Method): room air        I&O's Summary    17 Jun 2025 07:01  -  18 Jun 2025 07:00  --------------------------------------------------------  IN: 0 mL / OUT: 1150 mL / NET: -1150 mL        ON EXAM:    General: NAD, awake and alert, oriented x 3  HEENT: Mucous membranes are moist, anicteric  Lungs: Non-labored, +wheeze  Cardiovascular: Regular, S1 and S2, no murmurs, rubs, or gallops  Gastrointestinal: Bowel Sounds present, soft, nontender.   Lymph: No peripheral edema. No lymphadenopathy.  Skin: No rashes or ulcers  Psych:  Mood & affect appropriate    LABS: All Labs Reviewed:                        13.0   8.26  )-----------( 210      ( 17 Jun 2025 04:20 )             42.3                         13.6   9.18  )-----------( 250      ( 16 Jun 2025 06:44 )             44.1                         12.9   13.05 )-----------( 218      ( 15 Christopher 2025 09:32 )             40.8     18 Jun 2025 06:22    141    |  104    |  16     ----------------------------<  153    4.2     |  23     |  0.61   17 Jun 2025 04:20    137    |  102    |  15     ----------------------------<  205    4.1     |  23     |  0.70   16 Jun 2025 06:44    139    |  103    |  17     ----------------------------<  166    3.9     |  23     |  0.70     Ca    8.9        18 Jun 2025 06:22  Ca    8.8        17 Jun 2025 04:20  Ca    9.1        16 Jun 2025 06:44  Phos  2.8       18 Jun 2025 06:22  Phos  2.4       17 Jun 2025 04:20  Mg     2.0       17 Jun 2025 04:20      PT/INR - ( 17 Jun 2025 04:20 )   PT: 13.0 sec;   INR: 1.14 ratio         PTT - ( 17 Jun 2025 04:20 )  PTT:31.4 sec      Blood Culture: Organism --  Gram Stain Blood -- Gram Stain --  Specimen Source Blood Blood-Peripheral  Culture-Blood --    Organism --  Gram Stain Blood -- Gram Stain --  Specimen Source Blood Blood-Peripheral  Culture-Blood --

## 2025-06-18 NOTE — CONSULT NOTE ADULT - CONSULT REQUESTED DATE/TIME
16-Jun-2025 14:05
09-Jun-2025 13:55
18-Jun-2025 11:00
09-Jun-2025 13:44
11-Jun-2025 15:01
13-Jun-2025
13-Jun-2025 10:26
12-Jun-2025 15:14

## 2025-06-18 NOTE — PROGRESS NOTE ADULT - PROBLEM SELECTOR PLAN 2
Severe MR due to mitral valve prolaps    seen on SAFIA  Consult Structural, follow up recommendations

## 2025-06-18 NOTE — CONSULT NOTE ADULT - SUBJECTIVE AND OBJECTIVE BOX
HPI:  73 yo F with a PMH of Epilepsy on Keppra/lacosamide, COPD, asthma  (on CPAP at home, on chronic steroids), DM2, bronchiectasis, tracheomalacia s/p tracheloplasty, HTN, Hx of dissection of descending thoracic aorta, hx of aortic aneurysm, Type B dissection (7/2024), medically managed initially as she did not meet criteria for surgery at that time), evaluated by Dr. 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     History obtained from daughter, Zoe, as patient not responding to questions, those eyes open spontaneously to voice and light touch.   She reports that patient intermittently confused since most recent hospitalization.  She noted worsening productive cough. Also with N/V- non-bloody, non-bilious. No fevers at home. She was recently started on Bactrim by her outpatient pulmonologist, Dr. Allison, for MRSA in sputum.    Per report, patient had ?hemoptysis during CT chest, however, not collected.  (09 Jun 2025 15:21)    The Structural Team was consulted for evaluation of mitral regurgitation. Ms. Bloom is well known to the Structural Heart Team and Dr. Leahy. She underwent a Bental/AVR with Dr. Cabrera for Type A dissection in September 2022. She was found to have severe prosthetic stenosis in August 2023 and underwent Tristen TAVR with a 26mm Evolut Pro valve. She had known mitral regurgitation that was mild following TAVR. She has chronic dyspnea that worsened acutely in the days leading up to admission, she was unable to ambulate without assistance and became short of breath after only walking several feet. Noted to have (+) blood and sputum cultures, being treated for bacteremia and pneumonia. SAFIA noted to have severe MR.       PAST MEDICAL & SURGICAL HISTORY:  Seizure  x 1 1/7/18      DVT (deep venous thrombosis)  15-20 years ago, took coumadin      TIA (transient ischemic attack)  multiple, last 5 years ago - presents as right-sided weakness      COPD (chronic obstructive pulmonary disease)      Atrial fibrillation      History of COPD      Afib      Aortic valve replaced      Epilepsy      Asthma      DM (diabetes mellitus)      History of seizure disorder      History of TIAs      HTN (hypertension)      Bronchiectasis      Colon cancer      History of partial hysterectomy  30 years ago - fibroids      H/O total knee replacement, bilateral  5 years ago      History of sinus surgery  multiple sinus surgeries      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      H/O pelvic surgery  5 years ago - s/p fracture      History of tracheomalacia  2015 - attempted tracheal stenting ([facility])- course complicated by obstruction, respiratory failure, multiple CPR attempts -  stent discontinued; 10/20/2016 Tracheobronchoplasty (Prolene Mesh) performed at [facility] by Dr Zapien      S/P bronchoscopy  6/5/2018 - [place] (Dr Zapien) no evidence of tracheobronchomalacia in trachea or bronchial tubes      Rectal bleeding  exam under anesthesia ([facility]) 2/2018      S/P TAVR (transcatheter aortic valve replacement)      S/P aortic valve replacement      S/P colostomy      S/P AVR (aortic valve replacement)          REVIEW OF SYSTEMS:    CONSTITUTIONAL: No weakness, fevers or chills  EYES/ENT: No visual changes;  No vertigo or throat pain   NECK: No pain or stiffness  RESPIRATORY: No cough, wheezing, hemoptysis; No shortness of breath  CARDIOVASCULAR: No chest pain or palpitations  GASTROINTESTINAL: No abdominal or epigastric pain. No nausea, vomiting, or hematemesis; No diarrhea or constipation. No melena or hematochezia.  GENITOURINARY: No dysuria, frequency or hematuria  NEUROLOGICAL: No numbness or weakness  SKIN: No itching, rashes      MEDICATIONS  (STANDING):  albuterol    0.083% 2.5 milliGRAM(s) Nebulizer every 6 hours  apixaban 5 milliGRAM(s) Oral two times a day  artificial  tears Solution 1 Drop(s) Both EYES every 4 hours  budesonide    Inhalation Suspension 1 milliGRAM(s) Inhalation two times a day  chlorhexidine 2% Cloths 1 Application(s) Topical daily  dextrose 5%. 1000 milliLiter(s) (50 mL/Hr) IV Continuous <Continuous>  dextrose 5%. 1000 milliLiter(s) (100 mL/Hr) IV Continuous <Continuous>  dextrose 50% Injectable 25 Gram(s) IV Push once  dextrose 50% Injectable 12.5 Gram(s) IV Push once  dextrose 50% Injectable 25 Gram(s) IV Push once  famotidine    Tablet 20 milliGRAM(s) Oral daily  fluticasone propionate/ salmeterol 250-50 MICROgram(s) Diskus 1 Dose(s) Inhalation two times a day  formoterol for nebulization 20 MICROGram(s) Nebulizer every 12 hours  glucagon  Injectable 1 milliGRAM(s) IntraMuscular once  insulin glargine Injectable (LANTUS) 20 Unit(s) SubCutaneous at bedtime  insulin lispro (ADMELOG) corrective regimen sliding scale   SubCutaneous Before meals and at bedtime  insulin lispro Injectable (ADMELOG) 2 Unit(s) SubCutaneous before breakfast  insulin lispro Injectable (ADMELOG) 4 Unit(s) SubCutaneous before lunch  insulin lispro Injectable (ADMELOG) 4 Unit(s) SubCutaneous before dinner  lacosamide 100 milliGRAM(s) Oral two times a day  levETIRAcetam 1000 milliGRAM(s) Oral two times a day  methylPREDNISolone 8 milliGRAM(s) Oral daily  mexiletine 200 milliGRAM(s) Oral three times a day  mineral oil 30 milliLiter(s) Oral daily  montelukast 10 milliGRAM(s) Oral at bedtime  NIFEdipine XL 60 milliGRAM(s) Oral daily  Ohtuvayre 3 mg/2.5 mL Inhalation 3 milliGRAM(s) 3 milliGRAM(s) Nebulizer every 12 hours  pantoprazole    Tablet 40 milliGRAM(s) Oral before breakfast  polyethylene glycol 3350 17 Gram(s) Oral daily  rosuvastatin 5 milliGRAM(s) Oral at bedtime  senna 2 Tablet(s) Oral at bedtime  sertraline 50 milliGRAM(s) Oral daily  silver sulfADIAZINE 1% Cream 1 Application(s) Topical daily  sodium chloride 0.9%. 1000 milliLiter(s) (75 mL/Hr) IV Continuous <Continuous>  sodium chloride 7% Inhalation 4 milliLiter(s) Inhalation every 12 hours  tiotropium 2.5 MICROgram(s) Inhaler 2 Puff(s) Inhalation daily  vancomycin  IVPB 750 milliGRAM(s) IV Intermittent every 12 hours    MEDICATIONS  (PRN):  dextrose Oral Gel 15 Gram(s) Oral once PRN Blood Glucose LESS THAN 70 milliGRAM(s)/deciliter  lactulose Syrup 10 Gram(s) Oral daily PRN constipation  magnesium hydroxide Suspension 30 milliLiter(s) Oral daily PRN Constipation  melatonin 3 milliGRAM(s) Oral at bedtime PRN Insomnia  ondansetron Injectable 4 milliGRAM(s) IV Push every 8 hours PRN Nausea and/or Vomiting      Allergies    Valium (Short breath)  OxyContin (Unknown)  Valium (Unknown)  Avelox (Short breath; Pruritus)  penicillin (Anaphylaxis)  Dilaudid (Short breath)  shellfish (Anaphylaxis)  ampicillin (Unknown)  codeine (Short breath)  Percocet (Unknown)  tetanus toxoid (Short breath)  cefepime (Anaphylaxis)  cefepime (Short breath (Severe))  codeine (Unknown)  iodine (Short breath; Swelling)  aspirin (Short breath)  meropenem (Unknown)    Intolerances        SOCIAL HISTORY:    FAMILY HISTORY:  Family history of asthma    Family history of breast cancer (Sibling)    Family history of diabetes mellitus type II        Vital Signs Last 24 Hrs  T(C): 36.7 (18 Jun 2025 11:14), Max: 37 (17 Jun 2025 20:46)  T(F): 98.1 (18 Jun 2025 11:14), Max: 98.6 (17 Jun 2025 20:46)  HR: 76 (18 Jun 2025 11:14) (53 - 77)  BP: 123/75 (18 Jun 2025 11:14) (101/67 - 134/64)  BP(mean): 92 (17 Jun 2025 14:00) (76 - 92)  RR: 18 (18 Jun 2025 11:14) (14 - 18)  SpO2: 97% (18 Jun 2025 11:14) (96% - 100%)    Parameters below as of 18 Jun 2025 11:14  Patient On (Oxygen Delivery Method): room air        Physical Exam  General: A/ox 3, No acute Distress  Neck: Supple, NO JVD  Cardiac: S1 S2, No M/R/G  Pulmonary: CTAB, Breathing unlabored, No Rhonchi/Rales/Wheezing  Abdomen: Soft, Non -tender, +BS x 4 quads  Extremities: No Rashes, No edema  Neuro: A/o x 3, No focal deficits    LABS:                        13.0   8.26  )-----------( 210      ( 17 Jun 2025 04:20 )             42.3     06-18    141  |  104  |  16  ----------------------------<  153[H]  4.2   |  23  |  0.61    Ca    8.9      18 Jun 2025 06:22  Phos  2.8     06-18  Mg     2.0     06-17      PT/INR - ( 17 Jun 2025 04:20 )   PT: 13.0 sec;   INR: 1.14 ratio         PTT - ( 17 Jun 2025 04:20 )  PTT:31.4 sec  Urinalysis Basic - ( 18 Jun 2025 06:22 )    Color: x / Appearance: x / SG: x / pH: x  Gluc: 153 mg/dL / Ketone: x  / Bili: x / Urobili: x   Blood: x / Protein: x / Nitrite: x   Leuk Esterase: x / RBC: x / WBC x   Sq Epi: x / Non Sq Epi: x / Bacteria: x        RADIOLOGY & ADDITIONAL STUDIES:

## 2025-06-18 NOTE — PROGRESS NOTE ADULT - SUBJECTIVE AND OBJECTIVE BOX
[facility] Division of Hospital Medicine  Kacie Meek MD  MS Teams PREFERRED        SUBJECTIVE / OVERNIGHT EVENTS: Seen and examined at bedside. NAD.     MEDICATIONS  (STANDING):  albuterol    0.083% 2.5 milliGRAM(s) Nebulizer every 6 hours  apixaban 5 milliGRAM(s) Oral two times a day  artificial  tears Solution 1 Drop(s) Both EYES every 4 hours  budesonide    Inhalation Suspension 1 milliGRAM(s) Inhalation two times a day  chlorhexidine 2% Cloths 1 Application(s) Topical daily  dextrose 5%. 1000 milliLiter(s) (50 mL/Hr) IV Continuous <Continuous>  dextrose 5%. 1000 milliLiter(s) (100 mL/Hr) IV Continuous <Continuous>  dextrose 50% Injectable 25 Gram(s) IV Push once  dextrose 50% Injectable 12.5 Gram(s) IV Push once  dextrose 50% Injectable 25 Gram(s) IV Push once  famotidine    Tablet 20 milliGRAM(s) Oral daily  fluticasone propionate/ salmeterol 250-50 MICROgram(s) Diskus 1 Dose(s) Inhalation two times a day  formoterol for nebulization 20 MICROGram(s) Nebulizer every 12 hours  glucagon  Injectable 1 milliGRAM(s) IntraMuscular once  insulin glargine Injectable (LANTUS) 20 Unit(s) SubCutaneous at bedtime  insulin lispro (ADMELOG) corrective regimen sliding scale   SubCutaneous Before meals and at bedtime  insulin lispro Injectable (ADMELOG) 2 Unit(s) SubCutaneous before breakfast  insulin lispro Injectable (ADMELOG) 4 Unit(s) SubCutaneous before lunch  insulin lispro Injectable (ADMELOG) 4 Unit(s) SubCutaneous before dinner  lacosamide 100 milliGRAM(s) Oral two times a day  levETIRAcetam 1000 milliGRAM(s) Oral two times a day  methylPREDNISolone 8 milliGRAM(s) Oral daily  mexiletine 200 milliGRAM(s) Oral three times a day  mineral oil 30 milliLiter(s) Oral daily  montelukast 10 milliGRAM(s) Oral at bedtime  NIFEdipine XL 60 milliGRAM(s) Oral daily  Ohtuvayre 3 mg/2.5 mL Inhalation 3 milliGRAM(s) 3 milliGRAM(s) Nebulizer every 12 hours  pantoprazole    Tablet 40 milliGRAM(s) Oral before breakfast  polyethylene glycol 3350 17 Gram(s) Oral daily  rosuvastatin 5 milliGRAM(s) Oral at bedtime  senna 2 Tablet(s) Oral at bedtime  sertraline 50 milliGRAM(s) Oral daily  silver sulfADIAZINE 1% Cream 1 Application(s) Topical daily  sodium chloride 0.9%. 1000 milliLiter(s) (75 mL/Hr) IV Continuous <Continuous>  sodium chloride 7% Inhalation 4 milliLiter(s) Inhalation every 12 hours  tiotropium 2.5 MICROgram(s) Inhaler 2 Puff(s) Inhalation daily  vancomycin  IVPB 750 milliGRAM(s) IV Intermittent every 12 hours    MEDICATIONS  (PRN):  dextrose Oral Gel 15 Gram(s) Oral once PRN Blood Glucose LESS THAN 70 milliGRAM(s)/deciliter  lactulose Syrup 10 Gram(s) Oral daily PRN constipation  magnesium hydroxide Suspension 30 milliLiter(s) Oral daily PRN Constipation  melatonin 3 milliGRAM(s) Oral at bedtime PRN Insomnia  ondansetron Injectable 4 milliGRAM(s) IV Push every 8 hours PRN Nausea and/or Vomiting      I&O's Summary    17 Jun 2025 07:01  -  18 Jun 2025 07:00  --------------------------------------------------------  IN: 0 mL / OUT: 1150 mL / NET: -1150 mL        PHYSICAL EXAM:  Vital Signs Last 24 Hrs  T(C): 36.7 (18 Jun 2025 11:14), Max: 37 (17 Jun 2025 20:46)  T(F): 98.1 (18 Jun 2025 11:14), Max: 98.6 (17 Jun 2025 20:46)  HR: 76 (18 Jun 2025 11:14) (53 - 77)  BP: 123/75 (18 Jun 2025 11:14) (101/67 - 134/64)  BP(mean): 92 (17 Jun 2025 14:00) (76 - 92)  RR: 18 (18 Jun 2025 11:14) (14 - 18)  SpO2: 97% (18 Jun 2025 11:14) (96% - 100%)    Parameters below as of 18 Jun 2025 11:14  Patient On (Oxygen Delivery Method): room air    GENERAL: NAD, breathing not labored   EYES: conjunctiva and sclera clear  NECK: supple, No JVD  CHEST/LUNG: b/l rhonchi (improved)  HEART: S1 S2 RRR  ABDOMEN: +BS Soft, NT/ND  EXTREMITIES:  2+ DP Pulses, No c/c. no LE edema  NEUROLOGY: AAOx3, no facial droop, moving all extremities       LABS:                        13.0   8.26  )-----------( 210      ( 17 Jun 2025 04:20 )             42.3     06-18    141  |  104  |  16  ----------------------------<  153[H]  4.2   |  23  |  0.61    Ca    8.9      18 Jun 2025 06:22  Phos  2.8     06-18  Mg     2.0     06-17      PT/INR - ( 17 Jun 2025 04:20 )   PT: 13.0 sec;   INR: 1.14 ratio         PTT - ( 17 Jun 2025 04:20 )  PTT:31.4 sec      Urinalysis Basic - ( 18 Jun 2025 06:22 )    Color: x / Appearance: x / SG: x / pH: x  Gluc: 153 mg/dL / Ketone: x  / Bili: x / Urobili: x   Blood: x / Protein: x / Nitrite: x   Leuk Esterase: x / RBC: x / WBC x   Sq Epi: x / Non Sq Epi: x / Bacteria: x

## 2025-06-18 NOTE — PROGRESS NOTE ADULT - ASSESSMENT
Shirley is a 73 yo F w/ asthma (chronic methypred 8mg PO daily), bronchiectasis, allergic rhinitis, recurrent PNA, SDB ( on CPAP), tracheomalacia s/p tracheoplasty, tracheobronchomalacia, pAfib (Eliquis), colorectal ca s/p colostomy, aortic dissection s/p ascending aorta repair who p/w cough, SOB and hemoptysis.     #SOB: Presented to OP Pulm office on 6/2 and those cultures were growing MRSA  Blood cx here positive for MRSA as well with clearance of blood cultures on 6/11  SOB thought to be 2/2 PNA/COPD exacerbation and pt feels markedly improved after treatment. Remains on RA this AM  Inhalers as per pulm   Steroids being decreased back to home dose     #MRSA bacteremia: Blood cx as noted above. Repeat cultures on 6/11 negative  S/p SAFIA without vegetations however showing severe MR  SAFIA seems to suggest extension of her chronic dissection   Reviewed images and recent CTs with Dr. Cabrera (her surgeon from 2022), no need for any intervention or repeat imaging   Severe mr has now been documented on SAFIA with flow reversal in the pulm veins. Will have structural consult.     #PVCs: On chronic mexiletine therapy, continue   - Tele monitoring     #Dissection s/p repair: type A dissection s/p bioAVR and Bental in 2022 then severe AS s/p TAVR 9/2023:  - On Labetalol at home, currently on hold   -bp well controlled on present meds including nifedipine  - Given BPs, would initiate low dose Metoprolol instead of Labetalol. Initiate Lopressor 12.5mg BID   Shirley is a 75 yo F w/ asthma (chronic methypred 8mg PO daily), bronchiectasis, allergic rhinitis, recurrent PNA, SDB ( on CPAP), tracheomalacia s/p tracheoplasty, tracheobronchomalacia, pAfib (Eliquis), colorectal ca s/p colostomy, aortic dissection s/p ascending aorta repair who p/w cough, SOB and hemoptysis.     #SOB: Presented to OP Pulm office on 6/2 and those cultures were growing MRSA  Blood cx here positive for MRSA as well with clearance of blood cultures on 6/11  SOB thought to be 2/2 PNA/COPD exacerbation and pt feels markedly improved after treatment. Remains on RA this AM  Inhalers as per pulm   Steroids being decreased back to home dose     #MRSA bacteremia: Blood cx as noted above. Repeat cultures on 6/11 negative  S/p SAFIA without vegetations however showing severe MR  SAFIA seems to suggest extension of her chronic dissection   Reviewed images and recent CTs with Dr. Cabrera (her surgeon from 2022), no need for any intervention or repeat imaging   Severe mr has now been documented on SAFIA with flow reversal in the pulm veins. Will have structural consult, discussed with Dr. Cr    #PVCs: On chronic mexiletine therapy, continue   - Tele monitoring     #Dissection s/p repair: type A dissection s/p bioAVR and Bental in 2022 then severe AS s/p TAVR 9/2023:  - On Labetalol at home, currently on hold   -bp well controlled on present meds including nifedipine  - Given BPs, would initiate low dose Metoprolol instead of Labetalol. Initiate Lopressor 12.5mg BID

## 2025-06-18 NOTE — PROGRESS NOTE ADULT - SUBJECTIVE AND OBJECTIVE BOX
Follow Up:  MRSA bacteremic pneumonia    Interval History/ROS:  no fever.  feels okay.  c/o SOB.  ROS otherwise negative.    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  H/O pelvic surgery 5 years ago - s/p fracture  History of tracheomalacia 2015 - attempted tracheal stenting ([facility])- course complicated by obstruction, respiratory failure, multiple CPR attempts -  stent discontinued; 10/20/2016 Tracheobronchoplasty (Prolene Mesh) performed at [facility] by Dr Zapien  S/P bronchoscopy 6/5/2018 - [place] (Dr Zapien) no evidence of tracheobronchomalacia in trachea or bronchial tubes  Rectal bleeding exam under anesthesia ([facility]) 2/2018  S/P TAVR (transcatheter aortic valve replacement)  S/P colostomy  S/P AVR (aortic valve replacement)    Allergies  Valium (Short breath)  OxyContin (Unknown)  Valium (Unknown)  Avelox (Short breath; Pruritus)  penicillin (Anaphylaxis)  Dilaudid (Short breath)  shellfish (Anaphylaxis)  ampicillin (Unknown)  codeine (Short breath)  Percocet (Unknown)  tetanus toxoid (Short breath)  cefepime (Anaphylaxis)  cefepime (Short breath (Severe))  codeine (Unknown)  iodine (Short breath; Swelling)  aspirin (Short breath)  meropenem (Unknown)    ANTIMICROBIALS:    vancomycin  IVPB 750 every 12 hours    MEDICATIONS  (STANDING):  albuterol    0.083% 2.5 every 6 hours  apixaban 5 two times a day  budesonide    Inhalation Suspension 1 two times a day  famotidine    Tablet 20 daily  fluticasone propionate/ salmeterol 250-50 MICROgram(s) Diskus 1 two times a day  formoterol for nebulization 20 every 12 hours  insulin glargine Injectable (LANTUS) 20 at bedtime  insulin lispro (ADMELOG) corrective regimen sliding scale  Before meals and at bedtime  insulin lispro Injectable (ADMELOG) 4 before lunch  insulin lispro Injectable (ADMELOG) 4 before dinner  insulin lispro Injectable (ADMELOG) 2 before breakfast  lacosamide 100 two times a day  levETIRAcetam 1000 two times a day  methylPREDNISolone 8 daily  mexiletine 200 three times a day  mineral oil 30 daily  montelukast 10 at bedtime  NIFEdipine XL 60 daily  pantoprazole    Tablet 40 before breakfast  polyethylene glycol 3350 17 daily  rosuvastatin 5 at bedtime  senna 2 at bedtime  sertraline 50 daily  sodium chloride 7% Inhalation 4 every 12 hours  tiotropium 2.5 MICROgram(s) Inhaler 2 daily    Vital Signs Last 24 Hrs  T(F): 98.1 (06-18-25 @ 11:14), Max: 98.6 (06-17-25 @ 20:46)  HR: 76 (06-18-25 @ 11:14)  BP: 123/75 (06-18-25 @ 11:14)  RR: 18 (06-18-25 @ 11:14)  SpO2: 97% (06-18-25 @ 11:14) (96% - 100%)  Wt(kg): --    PHYSICAL EXAM:  Constitutional: non-toxic  HEAD/EYES: anicteric  ENT:  supple  Cardiovascular:   normal S1, S2  Respiratory:  clear BS bilaterally  GI:  soft, non-tender, normal bowel sounds  :  no ramirez  Musculoskeletal:  no synovitis  Neurologic: awake and alert, normal strength, no focal findings  Skin:  no rash  Psychiatric:  awake, alert, appropriate mood                                          13.0   8.26  )-----------( 210      ( 17 Jun 2025 04:20 )             42.3 06-18    141  |  104  |  16  ----------------------------<  153  4.2   |  23  |  0.61  Ca    8.9      18 Jun 2025 06:22Phos  2.8     06-18Mg     2.0     06-17    MICROBIOLOGY:  Vancomycin Level, Trough: 11.1 (06-16 @ 06:44)  Vancomycin Level, Trough: 10.9 (06-15 @ 09:32)  Vancomycin Level, Trough: 10.5 (06-13 @ 12:23)  Vancomycin Level, Trough: 4.5 (06-11 @ 08:24)    6/13 BC (-)  6/11 BC (+) MRSA  6/10 Sputum cx (+) MRSA  6/9 UC (-)  6/9 BC (+) MRSA  6/9 Sputum cx (+) MRSA    RADIOLOGY:  below radiology personally reviewed and agree with finding    CT Abdomen and Pelvis w/ IV Cont (06.12.25 @ 15:12) >  IMPRESSION:  Patchy cortical hypoenhancement in the bilateral renal lower poles, possibly pyelonephritis.  No abscess.    MR Lumbar Spine w/wo IV Cont (06.11.25 @ 10:44) >  IMPRESSION:  1. No evidence of discitis-osteomyelitis, epidural or paraspinal abscess.  2. Stable grade 1 anterolisthesis L3 over L4 with severe bilateral L3-L4 facet arthrosis.  3. L3-L4 anterolisthesis with a stable left foraminal-lateral disc protrusion superimposed upon a disc bulge, resulting in moderate central canal and mild left neural foramen stenosis with facet arthrosis and ligamentum flavum hypertrophy, crowding the nerve roots of the cauda equina. Left foraminal-lateral annular tear.  4. L4-L5 stable right foraminal-lateral disc protrusion superimposed upon a disc bulge, resulting in mild central canal, bilateral lateral recess, moderate right and mild left neural foramen stenosis with facet arthrosis and ligamentum flavum hypertrophy, contacting the right L4 and L5 nerve   roots.  5. L5-S1 stable disc bulge-spondylitic ridge complex, resulting in mild central canal and severe bilateral neural foramen stenosis with facet arthrosis, impinging upon bilateral L5 nerve roots.  6. Additional findings, including those degenerative, described in detail above.    CT Chest No Cont (06.09.25 @ 15:14) >  IMPRESSION:  Multifocal pneumonia. Recommend follow-up chest CT in 3 months to ensure clearance.        ECHO:  SAFIA W or WO Ultrasound Enhancing Agent (06.17.25 @ 11:11) >  CONCLUSIONS:   1. Left ventricular systolic function is normal with an ejection fraction visually estimated at 65 to 70 %.   2. Normal right ventricular cavity size and normal right ventricular systolic function.   3. No echocardiographic evidence of vegetations.   4. Prolapse of the anterior mitral leaflet.   5. Severe mitral regurgitation. The mitral regurgitant jet is posteriorly directed. Mechanism of mitral regurgitation: Porsha Type II (excessive leaflet motion with prolapse). Systolic flow reversal in the pulmonary veins, which is consistent with severe mitral regurgitation.   6. A Transcatheter deployed (TAVR) valve replacement is present in the aortic position TAVR valve The prosthetic valve has normal leaflet excursion . No prosthetic aortic stenosis. No intravalvular regurgitation No paravalvular regurgitation.   7. TAVR valve well seated, no evidence of vegetations or abscess.   8. Known type A chronic aortic dissection is seen.   9. Aortic dissection seen at the a dissection flap with a false lumen is present in the descending aorta that extends to the aortic arch.  10. No evidence of left atrial or left atrial appendage thrombus.  11. Compared to the transesophageal echocardiogram performed on 8/30/2023, the aortic dissectionapears to have extended.    TTE W or WO Ultrasound Enhancing Agent (06.11.25 @ 11:42) >  CONCLUSIONS:   1. Left ventricular systolic function is hyperdynamic with an ejection fraction of 81 % by Felipe's method of disks. There are no regional wall motion abnormalities seen.   2. Mild left ventricular hypertrophy.   3. There is moderate (grade 2) left ventricular diastolic dysfunction.   4. Enlarged right ventricular cavity size and mild to moderately reduced right ventricular systolic function. Tricuspid annular plane systolic excursion (TAPSE) is 1.1 cm (normal >=1.7 cm). Tricuspid annular tissue Doppler S' is 10.0 cm/s (normal >10 cm/s).   5. Moderate mitral regurgitation.   6. Estimated pulmonary artery systolic pressure is 46 mmHg.   7. No significant valvular disease.   8. The inferior vena cava is normal in size (normal <2.1cm) with normal inspiratory collapse (normal >50%) consistent with normal right atrial pressure (~3, range 0-5mmHg).   9. Compared to the transthoracic echocardiogram performed on 7/3/2024, there have been no significant interval changes.

## 2025-06-18 NOTE — ADVANCED PRACTICE NURSE CONSULT - REASON FOR CONSULT
Complete pouching change    HPI as noted from the chart: 74Y F complex PMH Epilepsy on Keppra, COPD, asthma  (on CPAP and VATS at home, on chronic steroids), DM2, bronchiectasis, tracheomalacia s/p tracheloplasty, HTN, Hx of dissection of descending thoracic aorta, hx of aortic aneurysm, Type B dissection (7/2024), medically managed initially as she did not meet criteria for surgery at that time), evaluated by Dr. 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 Patient reporting worsening cough and SOB over last 2-3 days. Patient recently dx with MRSA on sputum culture, started treatment on Bactrim. Reports she recently started Bactrim ds 1 BID x 10 days (6/5/2025).  WBC on admission elevated to 25K. CXR on admission showing right patchy airspace opacity may reflect pneumonia.           
Consult received from nursing for "ostomy supply and management"    HPI as noted from the chart:  74Y F complex PMH Epilepsy on Keppra, COPD, asthma  (on CPAP and VATS at home, on chronic steroids), DM2, bronchiectasis, tracheomalacia s/p tracheloplasty, HTN, Hx of dissection of descending thoracic aorta, hx of aortic aneurysm, Type B dissection (7/2024), medically managed initially as she did not meet criteria for surgery at that time), evaluated by Dr. 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 Patient reporting worsening cough and SOB over last 2-3 days. Patient recently dx with MRSA on sputum culture, started treatment on Bactrim. Reports she recently started Bactrim ds 1 BID x 10 days (6/5/2025)    Afebrile here in the ED. WBC on admission elevated to 25K. CXR on admission showing Right patchy airspace opacity may reflect pneumonia.

## 2025-06-18 NOTE — CONSULT NOTE ADULT - CONSULT REASON
Upper airway eval prior to SAFIA
skin injury
spine
MRSA bacteremia
Mitral Regurgitation
PNA
Pneumonia, Hemoptysis
Goals of care.

## 2025-06-18 NOTE — PROGRESS NOTE ADULT - PROBLEM SELECTOR PLAN 1
--Met SIRS with leukocytosis and tachpnyea (RR 22) on initial presentation. Present on admission.   --Multifocal Pneumonia on Imaging.   --Bacteremia- MRSA 3/4 bottles, repeat 6/11 1/4+GPC in clusters, repeat bx q48 hours     TTE without endocarditis - SAFIA done no vegetations but severe MR seen  monitor repeat blood cultures > NGTD f/u with final results   c/w vancomycin - monitor trough, dosing adjusted per ID - f/u regarding duration of antibiotics  ID and pulmonology following  Patient with many antibiotic allergies  Obtain PICC line for long term antibiotics

## 2025-06-19 ENCOUNTER — APPOINTMENT (OUTPATIENT)
Dept: NEUROSURGERY | Facility: CLINIC | Age: 75
End: 2025-06-19

## 2025-06-19 LAB
ANION GAP SERPL CALC-SCNC: 11 MMOL/L — SIGNIFICANT CHANGE UP (ref 5–17)
BUN SERPL-MCNC: 13 MG/DL — SIGNIFICANT CHANGE UP (ref 7–23)
CALCIUM SERPL-MCNC: 8.7 MG/DL — SIGNIFICANT CHANGE UP (ref 8.4–10.5)
CHLORIDE SERPL-SCNC: 105 MMOL/L — SIGNIFICANT CHANGE UP (ref 96–108)
CO2 SERPL-SCNC: 25 MMOL/L — SIGNIFICANT CHANGE UP (ref 22–31)
CREAT SERPL-MCNC: 0.66 MG/DL — SIGNIFICANT CHANGE UP (ref 0.5–1.3)
EGFR: 91 ML/MIN/1.73M2 — SIGNIFICANT CHANGE UP
EGFR: 91 ML/MIN/1.73M2 — SIGNIFICANT CHANGE UP
GLUCOSE BLDC GLUCOMTR-MCNC: 183 MG/DL — HIGH (ref 70–99)
GLUCOSE BLDC GLUCOMTR-MCNC: 205 MG/DL — HIGH (ref 70–99)
GLUCOSE BLDC GLUCOMTR-MCNC: 232 MG/DL — HIGH (ref 70–99)
GLUCOSE BLDC GLUCOMTR-MCNC: 291 MG/DL — HIGH (ref 70–99)
GLUCOSE SERPL-MCNC: 213 MG/DL — HIGH (ref 70–99)
HCT VFR BLD CALC: 40.8 % — SIGNIFICANT CHANGE UP (ref 34.5–45)
HGB BLD-MCNC: 12.7 G/DL — SIGNIFICANT CHANGE UP (ref 11.5–15.5)
MCHC RBC-ENTMCNC: 23.9 PG — LOW (ref 27–34)
MCHC RBC-ENTMCNC: 31.1 G/DL — LOW (ref 32–36)
MCV RBC AUTO: 76.8 FL — LOW (ref 80–100)
NRBC # BLD AUTO: 0 K/UL — SIGNIFICANT CHANGE UP (ref 0–0)
NRBC # FLD: 0 K/UL — SIGNIFICANT CHANGE UP (ref 0–0)
NRBC BLD AUTO-RTO: 0 /100 WBCS — SIGNIFICANT CHANGE UP (ref 0–0)
PLATELET # BLD AUTO: 218 K/UL — SIGNIFICANT CHANGE UP (ref 150–400)
PMV BLD: 9.1 FL — SIGNIFICANT CHANGE UP (ref 7–13)
POTASSIUM SERPL-MCNC: 4.2 MMOL/L — SIGNIFICANT CHANGE UP (ref 3.5–5.3)
POTASSIUM SERPL-SCNC: 4.2 MMOL/L — SIGNIFICANT CHANGE UP (ref 3.5–5.3)
RBC # BLD: 5.31 M/UL — HIGH (ref 3.8–5.2)
RBC # FLD: 16.7 % — HIGH (ref 10.3–14.5)
SODIUM SERPL-SCNC: 141 MMOL/L — SIGNIFICANT CHANGE UP (ref 135–145)
VANCOMYCIN TROUGH SERPL-MCNC: 10 UG/ML — SIGNIFICANT CHANGE UP (ref 10–20)
WBC # BLD: 5.92 K/UL — SIGNIFICANT CHANGE UP (ref 3.8–10.5)
WBC # FLD AUTO: 5.92 K/UL — SIGNIFICANT CHANGE UP (ref 3.8–10.5)

## 2025-06-19 PROCEDURE — G0545: CPT

## 2025-06-19 PROCEDURE — 99233 SBSQ HOSP IP/OBS HIGH 50: CPT

## 2025-06-19 PROCEDURE — 99232 SBSQ HOSP IP/OBS MODERATE 35: CPT

## 2025-06-19 PROCEDURE — 71045 X-RAY EXAM CHEST 1 VIEW: CPT | Mod: 26

## 2025-06-19 RX ORDER — VANCOMYCIN HCL IN 5 % DEXTROSE 1.5G/250ML
150 PLASTIC BAG, INJECTION (ML) INTRAVENOUS
Qty: 70 | Refills: 0
Start: 2025-06-19 | End: 2025-07-23

## 2025-06-19 RX ORDER — VANCOMYCIN HCL IN 5 % DEXTROSE 1.5G/250ML
750 PLASTIC BAG, INJECTION (ML) INTRAVENOUS
Qty: 0 | Refills: 0 | DISCHARGE
Start: 2025-06-19 | End: 2025-07-31

## 2025-06-19 RX ORDER — VANCOMYCIN HCL IN 5 % DEXTROSE 1.5G/250ML
250 PLASTIC BAG, INJECTION (ML) INTRAVENOUS
Qty: 0 | Refills: 0 | DISCHARGE
End: 2025-07-25

## 2025-06-19 RX ADMIN — BUDESONIDE 1 MILLIGRAM(S): 0.25 SUSPENSION RESPIRATORY (INHALATION) at 18:06

## 2025-06-19 RX ADMIN — POLYETHYLENE GLYCOL 3350 17 GRAM(S): 17 POWDER, FOR SOLUTION ORAL at 12:01

## 2025-06-19 RX ADMIN — Medication 1 DROP(S): at 06:11

## 2025-06-19 RX ADMIN — INSULIN LISPRO 4 UNIT(S): 100 INJECTION, SOLUTION INTRAVENOUS; SUBCUTANEOUS at 18:05

## 2025-06-19 RX ADMIN — APIXABAN 5 MILLIGRAM(S): 2.5 TABLET, FILM COATED ORAL at 18:05

## 2025-06-19 RX ADMIN — INSULIN GLARGINE-YFGN 20 UNIT(S): 100 INJECTION, SOLUTION SUBCUTANEOUS at 21:46

## 2025-06-19 RX ADMIN — Medication 1 DROP(S): at 13:29

## 2025-06-19 RX ADMIN — INSULIN LISPRO 2 UNIT(S): 100 INJECTION, SOLUTION INTRAVENOUS; SUBCUTANEOUS at 07:57

## 2025-06-19 RX ADMIN — Medication 40 MILLIGRAM(S): at 06:12

## 2025-06-19 RX ADMIN — Medication 4 MILLILITER(S): at 18:06

## 2025-06-19 RX ADMIN — MONTELUKAST SODIUM 10 MILLIGRAM(S): 10 TABLET ORAL at 21:46

## 2025-06-19 RX ADMIN — Medication 2 TABLET(S): at 21:46

## 2025-06-19 RX ADMIN — Medication 60 MILLIGRAM(S): at 06:11

## 2025-06-19 RX ADMIN — LACOSAMIDE 100 MILLIGRAM(S): 150 TABLET, FILM COATED ORAL at 18:06

## 2025-06-19 RX ADMIN — Medication 1 DOSE(S): at 18:07

## 2025-06-19 RX ADMIN — INSULIN LISPRO 4 UNIT(S): 100 INJECTION, SOLUTION INTRAVENOUS; SUBCUTANEOUS at 12:02

## 2025-06-19 RX ADMIN — Medication 30 MILLILITER(S): at 12:00

## 2025-06-19 RX ADMIN — Medication 1 DROP(S): at 18:06

## 2025-06-19 RX ADMIN — INSULIN LISPRO 1: 100 INJECTION, SOLUTION INTRAVENOUS; SUBCUTANEOUS at 07:57

## 2025-06-19 RX ADMIN — Medication 1 APPLICATION(S): at 12:01

## 2025-06-19 RX ADMIN — ROSUVASTATIN CALCIUM 5 MILLIGRAM(S): 20 TABLET, FILM COATED ORAL at 21:46

## 2025-06-19 RX ADMIN — Medication 1 DROP(S): at 11:58

## 2025-06-19 RX ADMIN — Medication 2.5 MILLIGRAM(S): at 12:00

## 2025-06-19 RX ADMIN — LEVETIRACETAM 1000 MILLIGRAM(S): 10 INJECTION, SOLUTION INTRAVENOUS at 18:05

## 2025-06-19 RX ADMIN — LACOSAMIDE 100 MILLIGRAM(S): 150 TABLET, FILM COATED ORAL at 06:14

## 2025-06-19 RX ADMIN — Medication 2.5 MILLIGRAM(S): at 18:06

## 2025-06-19 RX ADMIN — TIOTROPIUM BROMIDE INHALATION SPRAY 2 PUFF(S): 3.12 SPRAY, METERED RESPIRATORY (INHALATION) at 12:01

## 2025-06-19 RX ADMIN — APIXABAN 5 MILLIGRAM(S): 2.5 TABLET, FILM COATED ORAL at 06:12

## 2025-06-19 RX ADMIN — LEVETIRACETAM 1000 MILLIGRAM(S): 10 INJECTION, SOLUTION INTRAVENOUS at 06:12

## 2025-06-19 RX ADMIN — INSULIN LISPRO 3: 100 INJECTION, SOLUTION INTRAVENOUS; SUBCUTANEOUS at 12:02

## 2025-06-19 RX ADMIN — FORMOTEROL FUMARATE DIHYDRATE 20 MICROGRAM(S): 20 SOLUTION RESPIRATORY (INHALATION) at 18:07

## 2025-06-19 RX ADMIN — MEXILETINE HYDROCHLORIDE 200 MILLIGRAM(S): 250 CAPSULE ORAL at 13:29

## 2025-06-19 RX ADMIN — Medication 250 MILLIGRAM(S): at 07:58

## 2025-06-19 RX ADMIN — MEXILETINE HYDROCHLORIDE 200 MILLIGRAM(S): 250 CAPSULE ORAL at 21:46

## 2025-06-19 RX ADMIN — Medication 20 MILLIGRAM(S): at 12:00

## 2025-06-19 RX ADMIN — MEXILETINE HYDROCHLORIDE 200 MILLIGRAM(S): 250 CAPSULE ORAL at 06:11

## 2025-06-19 RX ADMIN — SERTRALINE 50 MILLIGRAM(S): 100 TABLET, FILM COATED ORAL at 12:00

## 2025-06-19 RX ADMIN — INSULIN LISPRO 2: 100 INJECTION, SOLUTION INTRAVENOUS; SUBCUTANEOUS at 18:05

## 2025-06-19 RX ADMIN — METHYLPREDNISOLONE ACETATE 8 MILLIGRAM(S): 80 INJECTION, SUSPENSION INTRA-ARTICULAR; INTRALESIONAL; INTRAMUSCULAR; SOFT TISSUE at 06:12

## 2025-06-19 NOTE — PROGRESS NOTE ADULT - SUBJECTIVE AND OBJECTIVE BOX
CHIEF COMPLAINT: sob    Interval Events: Patient seen and examined at bedside. No acute events overnight. Patient reports she feels well this morning.     REVIEW OF SYSTEMS:  Constitutional: [X ] negative [ ] fevers [ ] chills [ ] weight loss [ ] weight gain  HEENT: [X ] negative [ ] dry eyes [ ] eye irritation [ ] postnasal drip [ ] nasal congestion  CV: [X ] negative  [ ] chest pain [ ] orthopnea [ ] palpitations [ ] murmur  Resp: [X ] negative [ ] cough [ ] shortness of breath [ ] dyspnea [ ] wheezing [ ] sputum [ ] hemoptysis  GI: [X ] negative [ ] nausea [ ] vomiting [ ] diarrhea [ ] constipation [ ] abd pain [ ] dysphagia   : [ X] negative [ ] dysuria [ ] nocturia [ ] hematuria [ ] increased urinary frequency  Musculoskeletal: [ ] negative [ ] back pain [ ] myalgias [ ] arthralgias [ ] fracture  Skin: [ ] negative [ ] rash [ ] itch  Neurological: [ ] negative [ ] headache [ ] dizziness [ ] syncope [ ] weakness [ ] numbness  Psychiatric: [ ] negative [ ] anxiety [ ] depression  Endocrine: [ ] negative [ ] diabetes [ ] thyroid problem  Hematologic/Lymphatic: [ ] negative [ ] anemia [ ] bleeding problem  Allergic/Immunologic: [ ] negative [ ] itchy eyes [ ] nasal discharge [ ] hives [ ] angioedema  [ ] All other systems negative  [ ] Unable to assess ROS because ________    OBJECTIVE:  ICU Vital Signs Last 24 Hrs  T(C): 36.9 (19 Jun 2025 11:04), Max: 37.3 (18 Jun 2025 16:28)  T(F): 98.4 (19 Jun 2025 11:04), Max: 99.2 (18 Jun 2025 16:28)  HR: 62 (19 Jun 2025 11:04) (55 - 77)  BP: 101/67 (19 Jun 2025 11:04) (101/57 - 129/80)  BP(mean): --  ABP: --  ABP(mean): --  RR: 18 (19 Jun 2025 11:04) (18 - 18)  SpO2: 98% (19 Jun 2025 11:04) (98% - 100%)    O2 Parameters below as of 19 Jun 2025 11:04  Patient On (Oxygen Delivery Method): room air              06-18 @ 07:01  -  06-19 @ 07:00  --------------------------------------------------------  IN: 0 mL / OUT: 700 mL / NET: -700 mL      CAPILLARY BLOOD GLUCOSE      POCT Blood Glucose.: 291 mg/dL (19 Jun 2025 11:23)      PHYSICAL EXAM:  General: well appearing female, NAD   Neck: Supple, NO JVD  Cardiac: RRR,  S1/ S2, No murmurs, rubs, gallops  Pulmonary: scattered rhonchi anteriorly, Breathing unlabored, No Rhonchi/Rales/Wheezing  Abdomen: Soft, non tender, non distended, +BS; +ostomy  Extremities: No Rashes, No pedal edema, no TTP on calfs  Neuro: A/o x 3, No focal deficits  Psch: normal mood , normal affect    HOSPITAL MEDICATIONS:  apixaban 5 milliGRAM(s) Oral two times a day    vancomycin  IVPB 750 milliGRAM(s) IV Intermittent every 12 hours    mexiletine 200 milliGRAM(s) Oral three times a day  NIFEdipine XL 60 milliGRAM(s) Oral daily    dextrose 50% Injectable 25 Gram(s) IV Push once  dextrose 50% Injectable 12.5 Gram(s) IV Push once  dextrose 50% Injectable 25 Gram(s) IV Push once  dextrose Oral Gel 15 Gram(s) Oral once PRN  glucagon  Injectable 1 milliGRAM(s) IntraMuscular once  insulin glargine Injectable (LANTUS) 20 Unit(s) SubCutaneous at bedtime  insulin lispro (ADMELOG) corrective regimen sliding scale   SubCutaneous Before meals and at bedtime  insulin lispro Injectable (ADMELOG) 2 Unit(s) SubCutaneous before breakfast  insulin lispro Injectable (ADMELOG) 4 Unit(s) SubCutaneous before lunch  insulin lispro Injectable (ADMELOG) 4 Unit(s) SubCutaneous before dinner  methylPREDNISolone 8 milliGRAM(s) Oral daily  rosuvastatin 5 milliGRAM(s) Oral at bedtime    albuterol    0.083% 2.5 milliGRAM(s) Nebulizer every 6 hours  budesonide    Inhalation Suspension 1 milliGRAM(s) Inhalation two times a day  fluticasone propionate/ salmeterol 250-50 MICROgram(s) Diskus 1 Dose(s) Inhalation two times a day  formoterol for nebulization 20 MICROGram(s) Nebulizer every 12 hours  montelukast 10 milliGRAM(s) Oral at bedtime  sodium chloride 7% Inhalation 4 milliLiter(s) Inhalation every 12 hours  tiotropium 2.5 MICROgram(s) Inhaler 2 Puff(s) Inhalation daily    lacosamide 100 milliGRAM(s) Oral two times a day  levETIRAcetam 1000 milliGRAM(s) Oral two times a day  melatonin 3 milliGRAM(s) Oral at bedtime PRN  ondansetron Injectable 4 milliGRAM(s) IV Push every 8 hours PRN  sertraline 50 milliGRAM(s) Oral daily    aluminum hydroxide/magnesium hydroxide/simethicone Suspension 30 milliLiter(s) Oral every 6 hours PRN  famotidine    Tablet 20 milliGRAM(s) Oral daily  lactulose Syrup 10 Gram(s) Oral daily PRN  magnesium hydroxide Suspension 30 milliLiter(s) Oral daily PRN  mineral oil 30 milliLiter(s) Oral daily  pantoprazole    Tablet 40 milliGRAM(s) Oral before breakfast  polyethylene glycol 3350 17 Gram(s) Oral daily  senna 2 Tablet(s) Oral at bedtime        dextrose 5%. 1000 milliLiter(s) IV Continuous <Continuous>  dextrose 5%. 1000 milliLiter(s) IV Continuous <Continuous>  sodium chloride 0.9%. 1000 milliLiter(s) IV Continuous <Continuous>      artificial  tears Solution 1 Drop(s) Both EYES every 4 hours  chlorhexidine 2% Cloths 1 Application(s) Topical daily  silver sulfADIAZINE 1% Cream 1 Application(s) Topical daily    Ohtuvayre 3 mg/2.5 mL Inhalation 3 milliGRAM(s) 3 milliGRAM(s) Nebulizer every 12 hours      LABS:                        12.7   5.92  )-----------( 218      ( 19 Jun 2025 06:37 )             40.8     Hgb Trend: 12.7<--, 13.0<--, 13.6<--, 12.9<--, 13.4<--  06-19    141  |  105  |  13  ----------------------------<  213[H]  4.2   |  25  |  0.66    Ca    8.7      19 Jun 2025 06:37  Phos  2.8     06-18      Creatinine Trend: 0.66<--, 0.61<--, 0.70<--, 0.70<--, 0.64<--, 0.61<--    Urinalysis Basic - ( 19 Jun 2025 06:37 )    Color: x / Appearance: x / SG: x / pH: x  Gluc: 213 mg/dL / Ketone: x  / Bili: x / Urobili: x   Blood: x / Protein: x / Nitrite: x   Leuk Esterase: x / RBC: x / WBC x   Sq Epi: x / Non Sq Epi: x / Bacteria: x            MICROBIOLOGY:     RADIOLOGY:  [ ] Reviewed and interpreted by me    PULMONARY FUNCTION TESTS:    EKG:

## 2025-06-19 NOTE — PROGRESS NOTE ADULT - SUBJECTIVE AND OBJECTIVE BOX
[facility] Division of Hospital Medicine  Kacie Meek MD  MS Teams PREFERRED        SUBJECTIVE / OVERNIGHT EVENTS: Seen at bedside this morning. She appears well. NAD.     MEDICATIONS  (STANDING):  albuterol    0.083% 2.5 milliGRAM(s) Nebulizer every 6 hours  apixaban 5 milliGRAM(s) Oral two times a day  artificial  tears Solution 1 Drop(s) Both EYES every 4 hours  budesonide    Inhalation Suspension 1 milliGRAM(s) Inhalation two times a day  chlorhexidine 2% Cloths 1 Application(s) Topical daily  dextrose 5%. 1000 milliLiter(s) (100 mL/Hr) IV Continuous <Continuous>  dextrose 5%. 1000 milliLiter(s) (50 mL/Hr) IV Continuous <Continuous>  dextrose 50% Injectable 25 Gram(s) IV Push once  dextrose 50% Injectable 12.5 Gram(s) IV Push once  dextrose 50% Injectable 25 Gram(s) IV Push once  famotidine    Tablet 20 milliGRAM(s) Oral daily  fluticasone propionate/ salmeterol 250-50 MICROgram(s) Diskus 1 Dose(s) Inhalation two times a day  formoterol for nebulization 20 MICROGram(s) Nebulizer every 12 hours  glucagon  Injectable 1 milliGRAM(s) IntraMuscular once  insulin glargine Injectable (LANTUS) 20 Unit(s) SubCutaneous at bedtime  insulin lispro (ADMELOG) corrective regimen sliding scale   SubCutaneous Before meals and at bedtime  insulin lispro Injectable (ADMELOG) 2 Unit(s) SubCutaneous before breakfast  insulin lispro Injectable (ADMELOG) 4 Unit(s) SubCutaneous before lunch  insulin lispro Injectable (ADMELOG) 4 Unit(s) SubCutaneous before dinner  lacosamide 100 milliGRAM(s) Oral two times a day  levETIRAcetam 1000 milliGRAM(s) Oral two times a day  methylPREDNISolone 8 milliGRAM(s) Oral daily  mexiletine 200 milliGRAM(s) Oral three times a day  mineral oil 30 milliLiter(s) Oral daily  montelukast 10 milliGRAM(s) Oral at bedtime  NIFEdipine XL 60 milliGRAM(s) Oral daily  Ohtuvayre 3 mg/2.5 mL Inhalation 3 milliGRAM(s) 3 milliGRAM(s) Nebulizer every 12 hours  pantoprazole    Tablet 40 milliGRAM(s) Oral before breakfast  polyethylene glycol 3350 17 Gram(s) Oral daily  rosuvastatin 5 milliGRAM(s) Oral at bedtime  senna 2 Tablet(s) Oral at bedtime  sertraline 50 milliGRAM(s) Oral daily  silver sulfADIAZINE 1% Cream 1 Application(s) Topical daily  sodium chloride 0.9%. 1000 milliLiter(s) (75 mL/Hr) IV Continuous <Continuous>  sodium chloride 7% Inhalation 4 milliLiter(s) Inhalation every 12 hours  tiotropium 2.5 MICROgram(s) Inhaler 2 Puff(s) Inhalation daily  vancomycin  IVPB 750 milliGRAM(s) IV Intermittent every 12 hours    MEDICATIONS  (PRN):  aluminum hydroxide/magnesium hydroxide/simethicone Suspension 30 milliLiter(s) Oral every 6 hours PRN Dyspepsia  dextrose Oral Gel 15 Gram(s) Oral once PRN Blood Glucose LESS THAN 70 milliGRAM(s)/deciliter  lactulose Syrup 10 Gram(s) Oral daily PRN constipation  magnesium hydroxide Suspension 30 milliLiter(s) Oral daily PRN Constipation  melatonin 3 milliGRAM(s) Oral at bedtime PRN Insomnia  ondansetron Injectable 4 milliGRAM(s) IV Push every 8 hours PRN Nausea and/or Vomiting      I&O's Summary    18 Jun 2025 07:01  -  19 Jun 2025 07:00  --------------------------------------------------------  IN: 0 mL / OUT: 700 mL / NET: -700 mL        PHYSICAL EXAM:  Vital Signs Last 24 Hrs  T(C): 37.3 (19 Jun 2025 06:02), Max: 37.3 (18 Jun 2025 16:28)  T(F): 99.1 (19 Jun 2025 06:02), Max: 99.2 (18 Jun 2025 16:28)  HR: 55 (19 Jun 2025 06:02) (55 - 77)  BP: 101/57 (19 Jun 2025 06:02) (101/57 - 129/80)  BP(mean): --  RR: 18 (19 Jun 2025 06:02) (18 - 18)  SpO2: 100% (19 Jun 2025 06:02) (97% - 100%)    Parameters below as of 19 Jun 2025 06:02  Patient On (Oxygen Delivery Method): room air    GENERAL: NAD, breathing not labored   EYES: conjunctiva and sclera clear  NECK: supple, No JVD  CHEST/LUNG: b/l rhonchi (improved)  HEART: S1 S2 RRR  ABDOMEN: +BS Soft, NT/ND  EXTREMITIES:  2+ DP Pulses, No c/c. no LE edema  NEUROLOGY: AAOx3, no facial droop, moving all extremities     LABS:                        12.7   5.92  )-----------( 218      ( 19 Jun 2025 06:37 )             40.8     06-19    141  |  105  |  13  ----------------------------<  213[H]  4.2   |  25  |  0.66    Ca    8.7      19 Jun 2025 06:37  Phos  2.8     06-18            Urinalysis Basic - ( 19 Jun 2025 06:37 )    Color: x / Appearance: x / SG: x / pH: x  Gluc: 213 mg/dL / Ketone: x  / Bili: x / Urobili: x   Blood: x / Protein: x / Nitrite: x   Leuk Esterase: x / RBC: x / WBC x   Sq Epi: x / Non Sq Epi: x / Bacteria: x          RADIOLOGY & ADDITIONAL TESTS:  Results Reviewed:   Imaging Personally Reviewed:  Electrocardiogram Personally Reviewed:    COORDINATION OF CARE:  Care Discussed with Consultants/Other Providers [Y/N]:  Prior or Outpatient Records Reviewed [Y/N]:

## 2025-06-19 NOTE — PROGRESS NOTE ADULT - SUBJECTIVE AND OBJECTIVE BOX
[facility] - Le Jung, Jim Pablo, Sridhar Newsome  Office Number:  [phone]    Patient resting comfortably in bed in NAD.  Laying flat with no respiratory distress.  No complaints of chest pain, dyspnea, palpitations, PND, or orthopnea.    ROS: negative unless otherwise mentioned.    Telemetry: SR    MEDICATIONS  (STANDING):  albuterol    0.083% 2.5 milliGRAM(s) Nebulizer every 6 hours  apixaban 5 milliGRAM(s) Oral two times a day  artificial  tears Solution 1 Drop(s) Both EYES every 4 hours  budesonide    Inhalation Suspension 1 milliGRAM(s) Inhalation two times a day  chlorhexidine 2% Cloths 1 Application(s) Topical daily  dextrose 5%. 1000 milliLiter(s) (100 mL/Hr) IV Continuous <Continuous>  dextrose 5%. 1000 milliLiter(s) (50 mL/Hr) IV Continuous <Continuous>  dextrose 50% Injectable 25 Gram(s) IV Push once  dextrose 50% Injectable 12.5 Gram(s) IV Push once  dextrose 50% Injectable 25 Gram(s) IV Push once  famotidine    Tablet 20 milliGRAM(s) Oral daily  fluticasone propionate/ salmeterol 250-50 MICROgram(s) Diskus 1 Dose(s) Inhalation two times a day  formoterol for nebulization 20 MICROGram(s) Nebulizer every 12 hours  glucagon  Injectable 1 milliGRAM(s) IntraMuscular once  insulin glargine Injectable (LANTUS) 20 Unit(s) SubCutaneous at bedtime  insulin lispro (ADMELOG) corrective regimen sliding scale   SubCutaneous Before meals and at bedtime  insulin lispro Injectable (ADMELOG) 2 Unit(s) SubCutaneous before breakfast  insulin lispro Injectable (ADMELOG) 4 Unit(s) SubCutaneous before lunch  insulin lispro Injectable (ADMELOG) 4 Unit(s) SubCutaneous before dinner  lacosamide 100 milliGRAM(s) Oral two times a day  levETIRAcetam 1000 milliGRAM(s) Oral two times a day  methylPREDNISolone 8 milliGRAM(s) Oral daily  mexiletine 200 milliGRAM(s) Oral three times a day  mineral oil 30 milliLiter(s) Oral daily  montelukast 10 milliGRAM(s) Oral at bedtime  NIFEdipine XL 60 milliGRAM(s) Oral daily  Ohtuvayre 3 mg/2.5 mL Inhalation 3 milliGRAM(s) 3 milliGRAM(s) Nebulizer every 12 hours  pantoprazole    Tablet 40 milliGRAM(s) Oral before breakfast  polyethylene glycol 3350 17 Gram(s) Oral daily  rosuvastatin 5 milliGRAM(s) Oral at bedtime  senna 2 Tablet(s) Oral at bedtime  sertraline 50 milliGRAM(s) Oral daily  silver sulfADIAZINE 1% Cream 1 Application(s) Topical daily  sodium chloride 0.9%. 1000 milliLiter(s) (75 mL/Hr) IV Continuous <Continuous>  sodium chloride 7% Inhalation 4 milliLiter(s) Inhalation every 12 hours  tiotropium 2.5 MICROgram(s) Inhaler 2 Puff(s) Inhalation daily  vancomycin  IVPB 750 milliGRAM(s) IV Intermittent every 12 hours    MEDICATIONS  (PRN):  aluminum hydroxide/magnesium hydroxide/simethicone Suspension 30 milliLiter(s) Oral every 6 hours PRN Dyspepsia  dextrose Oral Gel 15 Gram(s) Oral once PRN Blood Glucose LESS THAN 70 milliGRAM(s)/deciliter  lactulose Syrup 10 Gram(s) Oral daily PRN constipation  magnesium hydroxide Suspension 30 milliLiter(s) Oral daily PRN Constipation  melatonin 3 milliGRAM(s) Oral at bedtime PRN Insomnia  ondansetron Injectable 4 milliGRAM(s) IV Push every 8 hours PRN Nausea and/or Vomiting      Allergies    Valium (Short breath)  OxyContin (Unknown)  Valium (Unknown)  Avelox (Short breath; Pruritus)  penicillin (Anaphylaxis)  Dilaudid (Short breath)  shellfish (Anaphylaxis)  ampicillin (Unknown)  codeine (Short breath)  Percocet (Unknown)  tetanus toxoid (Short breath)  cefepime (Anaphylaxis)  cefepime (Short breath (Severe))  codeine (Unknown)  iodine (Short breath; Swelling)  aspirin (Short breath)  meropenem (Unknown)    Intolerances        Vital Signs Last 24 Hrs  T(C): 37.3 (19 Jun 2025 06:02), Max: 37.3 (18 Jun 2025 16:28)  T(F): 99.1 (19 Jun 2025 06:02), Max: 99.2 (18 Jun 2025 16:28)  HR: 55 (19 Jun 2025 06:02) (55 - 77)  BP: 101/57 (19 Jun 2025 06:02) (101/57 - 129/80)  BP(mean): --  RR: 18 (19 Jun 2025 06:02) (18 - 18)  SpO2: 100% (19 Jun 2025 06:02) (97% - 100%)    Parameters below as of 19 Jun 2025 06:02  Patient On (Oxygen Delivery Method): room air        I&O's Summary    18 Jun 2025 07:01  -  19 Jun 2025 07:00  --------------------------------------------------------  IN: 0 mL / OUT: 700 mL / NET: -700 mL        ON EXAM:    General: NAD, awake and alert, oriented x 3  HEENT: Mucous membranes are moist, anicteric  Lungs: Non-labored, breath sounds are clear bilaterally, No wheezing, rales or rhonchi  Cardiovascular: Regular, S1 and S2, no murmurs, rubs, or gallops  Gastrointestinal: Bowel Sounds present, soft, nontender.   Lymph: No peripheral edema. No lymphadenopathy.  Skin: No rashes or ulcers  Psych:  Mood & affect appropriate    LABS: All Labs Reviewed:                        12.7   5.92  )-----------( 218      ( 19 Jun 2025 06:37 )             40.8                         13.0   8.26  )-----------( 210      ( 17 Jun 2025 04:20 )             42.3     19 Jun 2025 06:37    141    |  105    |  13     ----------------------------<  213    4.2     |  25     |  0.66   18 Jun 2025 06:22    141    |  104    |  16     ----------------------------<  153    4.2     |  23     |  0.61   17 Jun 2025 04:20    137    |  102    |  15     ----------------------------<  205    4.1     |  23     |  0.70     Ca    8.7        19 Jun 2025 06:37  Ca    8.9        18 Jun 2025 06:22  Ca    8.8        17 Jun 2025 04:20  Phos  2.8       18 Jun 2025 06:22  Phos  2.4       17 Jun 2025 04:20  Mg     2.0       17 Jun 2025 04:20            Blood Culture:

## 2025-06-19 NOTE — PROGRESS NOTE ADULT - PROBLEM SELECTOR PLAN 1
--Met SIRS with leukocytosis and tachpnyea (RR 22) on initial presentation. Present on admission.   --Multifocal Pneumonia on Imaging.   --Bacteremia- MRSA 3/4 bottles, repeat 6/11 1/4+GPC in clusters, repeat bx q48 hours     TTE without endocarditis - SAFIA done no vegetations but severe MR seen, structural said outpt f/u, no plan for surgery while on IV abx.  monitor repeat blood cultures > NGTD f/u with final results   c/w vancomycin - monitor trough, dosing adjusted per ID - f/u regarding duration of antibiotics  ID and pulmonology following  Patient with many antibiotic allergies  Obtain PICC line for long term antibiotics - ordered

## 2025-06-19 NOTE — ADVANCED PRACTICE NURSE CONSULT - ASSESSMENT
Chart reviewed and events noted.  In at the bedside to see patient for the consult received for ostomy management and supplies.  Patient is welll known to the ostomy team from her previous surgical hospitalization.  Patient endorses her daughter "Zoe" is the primary caregiver and she changes the pouching system.  Patient endorses her Pouch was changed today by the daughter prior to the ostomy RN's visit.  On exam: noted patient with [place] 2 piece ostomy appliances (using closed end beige appliance with filter).  Leakage noted to the flange and entire ostomy system changed.  Used pouching system removed.  Small amount of firm dry yellow stool noted caked to the skin barrier.  Educated patient to increase PO fluids to at least 2liter/day as MD's orders to prevent constipation.  Patient verbalized understanding.    Complete Pouch change: End colostomy 1 1/8" slightly budded and pink.  Peristomal skin and mucocutaneous junction intact.  Cleansed the skin with warm water guaze and pat dried.  Patient prefers not to use any skin barrier product for caulking (home regimen).  No undermining or leakage noted prior.  Skin barrier measured and cut to size.  Applied the skin barrier over the stoma confirming appropriate seal.  Re pouched with the drainable [place] Pouch.  Extra supplies and pattern left at the bedside for future use.  Patient appreciative of the ostomy RN's visit.  Safety maintained.  Plan of care discussed with the patient and clinical RN.  Ostomy team will not actively follow the patient.  Please reconsult as needed for future concerns.      
Midline Catheter Insertion Note    Catheter type: 4F  : Bard  Power injectable: Yes  LOT# [id number]                                                                                                                                                                                                                       Procedure assisted by:  HONEY Kendall RN  Time out was preformed, confirming the patient's first and last name, date of birth, procedure, and correct site prior to state of procedure.    Patient was placed with HOB 30 degrees. Patient placement site was prepped with chlorhexidine solution, then draped using maximum sterile barrier protection. The area was injected with 2  ml of 1% lidocaine. Using the Bard Site Rite 8, the catheter was placed using the Modified Seldinger Technique. Strict adherence to outline aseptic technique including handwashing, glove and gown, utilizing mask and cap, plus draping the patient with a sterile drape was observed. Upon completion of line placement, the insertion site was covered with a sterile occlusive CHG dressing. Pt tolerated procedure well.     All materials used for catheter insertion, including the intact guide wires, were accounted for at the end of the procedure.  Number of attempts: 1  Complications/Comments: None    Emergency Placement: No    Site: New  Anatomical Site of insertion: Right Basilic  Catheter size/length: 4F, 20cm  US guided Bard single lumen power midline placed  
PICC Line Insertion Note    Patient or patient representative educated about central line associated blood stream infection prevention practices.  Indication: long term antibiotics  Catheter type: 4 F,  SL Picc  : Bard  Power injectable: Yes  LOT# [id number]    Informed consent obtained by covering floor team.  Procedure assisted by: HONEY Sales RN  Time out was performed, confirming the patient's first and last name, date of birth, procedure, and correct site prior to start of procedure.    Patient was placed with HOB 30 degrees. A mask provided for patient. Patient placement site was prepped with chlorhexidine solution, then draped using maximum sterile barrier protection. Previous midline changed to SL PICC over guidewire. Strict adherence to outline aseptic technique including handwashing, glove and gown, utilizing mask and cap, plus draping the patient with a sterile drape was observed. Upon completion of line placement, the insertion site was covered with a sterile CHG dressing. Pt tolerated procedure well. V/S: BP(101/67), P(62), Temp(98.4F), O2sat(98%)    Site: Exchange from midline to SL PICC  Anatomical Site of insertion: Right Basilic vein  Catheter size/length: 4F, 39cm  US guided Bard Single lumen power SOLO PICC placed.    All materials used for catheter insertion, including the intact guide wires, were accounted for at the end of the procedure.  Number of attempts: 1  Complications/Comments: None  Emergency Placement: No  Post procedure verification with chest Xray as per orders.    Post procedure verbal instructions given to the patient or patient representative. Patient or patient representative  instructed regarding signs and symptoms of infection. Patient instructed to keep dressing dry and clean. Care for catheter as per hospital protocols    
Vascular Access Team    Evaluation for: Bedside MIDLINE placement  Requested by name: Lakshmi Bobo  Date/Time: 06/15/25 06:04    Indication for MIDLINE: Access  Allergy to CHG or Heparin or Lidocaine: no    Platelets(>20):251  INR(<3): 1.12  eGFR(>40): 93  Pending blood cultures: N/A  Anticoagulants: Eliquis  Arms DVT: no  Mastectomy: no  Fistula: no  IR or Nephrology or ID clearance needed: no    Consent obtained: N/A    Pending: n/a    Plan: Bedside midline order evaluated.     
Chart reviewed and events noted.  In at the bedside to see patient for a complete pouching change. Patient is well known to the ostomy team from her previous hospitalization. On exam: Patient reports being independent with her ostomy care stating "its already done, I changed it this morning".  Pouching system was assessed. Patient with [place] 2 piece ostomy pouching system. No leakage noted, pouching system remains intact. Supply needs assessed. Patient with adequate supplies at the bedside. Patient appreciative of RN's time and support.

## 2025-06-19 NOTE — PROGRESS NOTE ADULT - ASSESSMENT
74F w/ asthma (chronic methypred 8mg PO daily), bronchiectasis, allergic rhinitis, recurrent PNA, SDB ( on CPAP), tracheomalacia s/p tracheoplasty, tracheobronchomalacia, pAfib (Eliquis), colorectal ca s/p colostomy, aortic dissection s/p ascending aorta repair and prothestic aortic valve with severe AS s/p AVR, presents with cough, SOB and hemoptysis, found to have MRSA PNA and high grade bacteremial.     Home Airway clearance regimen: Albuterol q6h -> Chest Vest -> Perforomist BID  -> Pulmicort BID, - > Ohtuvayre 2.5 BID - > HyperSal 7% q12h. Patient also on Breo Ellipta 200 at 1 inhalation QD, Incruse Ellipta 1 puff QD, and on Tezspire airway     #MRSA PNA  #MRSA bacteremia  #Bronchiectasis and asthma exacerbation   #Hemoptysis  #SDB - on CPAP  - c/w Airway clearance regimen as possible Albuterol q6h -> Chest Vest -> Perforomist BID (patient brought in) -> Pulmicort BID, - > Ohtuvayre 2.5 BID - > HyperSal 7% q12h.  - c/w Eliquis for now, monitor for hemoptysis- no longer having for last 3 days  - Patient also on Breo Ellipta 200 at 1 inhalation QD, Incruse Ellipta 1 puff QD. Can use Advair/Spiriva while admitted  - Tezspire outpatient   - Agree with Vanc. f/u ID recs; SAFIA does not show evidence of endocarditis noted extension of chronic aortic dissection, follow up cards recs   - c/w home CPAP and Chest Vest for use  - c/w home dose methylpred 8mg daily   -discussed with patient and daughter importance of airway clearance and advised vest use at least 4x/day on discharge  - pending PICC placement and outpatient abx coordination

## 2025-06-19 NOTE — PROGRESS NOTE ADULT - SUBJECTIVE AND OBJECTIVE BOX
Follow Up:  MRSA bacteremic pneumonia    Interval History/ROS:  no fever. tired.  ROS otherwise negative.    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  H/O pelvic surgery 5 years ago - s/p fracture  History of tracheomalacia 2015 - attempted tracheal stenting ([facility])- course complicated by obstruction, respiratory failure, multiple CPR attempts -  stent discontinued; 10/20/2016 Tracheobronchoplasty (Prolene Mesh) performed at [facility] by Dr Zapien  S/P bronchoscopy 6/5/2018 - [place] (Dr Zapien) no evidence of tracheobronchomalacia in trachea or bronchial tubes  Rectal bleeding exam under anesthesia (ASU) 2/2018  S/P TAVR (transcatheter aortic valve replacement)  S/P colostomy  S/P AVR (aortic valve replacement)    Allergies  Valium (Short breath)  OxyContin (Unknown)  Valium (Unknown)  Avelox (Short breath; Pruritus)  penicillin (Anaphylaxis)  Dilaudid (Short breath)  shellfish (Anaphylaxis)  ampicillin (Unknown)  codeine (Short breath)  Percocet (Unknown)  tetanus toxoid (Short breath)  cefepime (Anaphylaxis)  cefepime (Short breath (Severe))  codeine (Unknown)  iodine (Short breath; Swelling)  aspirin (Short breath)  meropenem (Unknown)    ANTIMICROBIALS:    vancomycin  IVPB 750 every 12 hours    MEDICATIONS  (STANDING):  albuterol    0.083% 2.5 every 6 hours  apixaban 5 two times a day  budesonide    Inhalation Suspension 1 two times a day  famotidine    Tablet 20 daily  fluticasone propionate/ salmeterol 250-50 MICROgram(s) Diskus 1 two times a day  formoterol for nebulization 20 every 12 hours  insulin glargine Injectable (LANTUS) 20 at bedtime  insulin lispro (ADMELOG) corrective regimen sliding scale  Before meals and at bedtime  insulin lispro Injectable (ADMELOG) 4 before lunch  insulin lispro Injectable (ADMELOG) 4 before dinner  insulin lispro Injectable (ADMELOG) 2 before breakfast  lacosamide 100 two times a day  levETIRAcetam 1000 two times a day  methylPREDNISolone 8 daily  mexiletine 200 three times a day  mineral oil 30 daily  montelukast 10 at bedtime  NIFEdipine XL 60 daily  pantoprazole    Tablet 40 before breakfast  polyethylene glycol 3350 17 daily  rosuvastatin 5 at bedtime  senna 2 at bedtime  sertraline 50 daily  sodium chloride 7% Inhalation 4 every 12 hours  tiotropium 2.5 MICROgram(s) Inhaler 2 daily    Vital Signs Last 24 Hrs  T(F): 98.4 (06-19-25 @ 11:04), Max: 99.2 (06-18-25 @ 16:28)  HR: 62 (06-19-25 @ 11:04)  BP: 101/67 (06-19-25 @ 11:04)  RR: 18 (06-19-25 @ 11:04)  SpO2: 98% (06-19-25 @ 11:04) (98% - 100%)  Wt(kg): --    PHYSICAL EXAM:  Constitutional: non-toxic  HEAD/EYES: anicteric  ENT:  supple  Cardiovascular:   normal S1, S2  Respiratory:  clear BS bilaterally  GI:  soft, non-tender, normal bowel sounds  :  no ramirez  Musculoskeletal:  no synovitis  Neurologic: awake and alert, normal strength, no focal findings  Skin:  no rash  Psychiatric:  awake, alert, appropriate mood                                 12.7   5.92  )-----------( 218      ( 19 Jun 2025 06:37 )             40.8 06-19    141  |  105  |  13  ----------------------------<  213  4.2   |  25  |  0.66  Ca    8.7      19 Jun 2025 06:37Phos  2.8     06-18    MICROBIOLOGY:  Vancomycin Level, Trough: 11.1 (06-16 @ 06:44)  Vancomycin Level, Trough: 10.9 (06-15 @ 09:32)  Vancomycin Level, Trough: 10.5 (06-13 @ 12:23)  Vancomycin Level, Trough: 4.5 (06-11 @ 08:24)    6/13 BC (-)  6/11 BC (+) MRSA  6/10 Sputum cx (+) MRSA  6/9 UC (-)  6/9 BC (+) MRSA  6/9 Sputum cx (+) MRSA    RADIOLOGY:  below radiology personally reviewed and agree with finding    CT Abdomen and Pelvis w/ IV Cont (06.12.25 @ 15:12) >  IMPRESSION:  Patchy cortical hypoenhancement in the bilateral renal lower poles, possibly pyelonephritis.  No abscess.    MR Lumbar Spine w/wo IV Cont (06.11.25 @ 10:44) >  IMPRESSION:  1. No evidence of discitis-osteomyelitis, epidural or paraspinal abscess.  2. Stable grade 1 anterolisthesis L3 over L4 with severe bilateral L3-L4 facet arthrosis.  3. L3-L4 anterolisthesis with a stable left foraminal-lateral disc protrusion superimposed upon a disc bulge, resulting in moderate central canal and mild left neural foramen stenosis with facet arthrosis and ligamentum flavum hypertrophy, crowding the nerve roots of the cauda equina. Left foraminal-lateral annular tear.  4. L4-L5 stable right foraminal-lateral disc protrusion superimposed upon a disc bulge, resulting in mild central canal, bilateral lateral recess, moderate right and mild left neural foramen stenosis with facet arthrosis and ligamentum flavum hypertrophy, contacting the right L4 and L5 nerve   roots.  5. L5-S1 stable disc bulge-spondylitic ridge complex, resulting in mild central canal and severe bilateral neural foramen stenosis with facet arthrosis, impinging upon bilateral L5 nerve roots.  6. Additional findings, including those degenerative, described in detail above.    CT Chest No Cont (06.09.25 @ 15:14) >  IMPRESSION:  Multifocal pneumonia. Recommend follow-up chest CT in 3 months to ensure clearance.        ECHO:  SAFIA W or WO Ultrasound Enhancing Agent (06.17.25 @ 11:11) >  CONCLUSIONS:   1. Left ventricular systolic function is normal with an ejection fraction visually estimated at 65 to 70 %.   2. Normal right ventricular cavity size and normal right ventricular systolic function.   3. No echocardiographic evidence of vegetations.   4. Prolapse of the anterior mitral leaflet.   5. Severe mitral regurgitation. The mitral regurgitant jet is posteriorly directed. Mechanism of mitral regurgitation: Porsha Type II (excessive leaflet motion with prolapse). Systolic flow reversal in the pulmonary veins, which is consistent with severe mitral regurgitation.   6. A Transcatheter deployed (TAVR) valve replacement is present in the aortic position TAVR valve The prosthetic valve has normal leaflet excursion . No prosthetic aortic stenosis. No intravalvular regurgitation No paravalvular regurgitation.   7. TAVR valve well seated, no evidence of vegetations or abscess.   8. Known type A chronic aortic dissection is seen.   9. Aortic dissection seen at the a dissection flap with a false lumen is present in the descending aorta that extends to the aortic arch.  10. No evidence of left atrial or left atrial appendage thrombus.  11. Compared to the transesophageal echocardiogram performed on 8/30/2023, the aortic dissectionapears to have extended.    TTE W or WO Ultrasound Enhancing Agent (06.11.25 @ 11:42) >  CONCLUSIONS:   1. Left ventricular systolic function is hyperdynamic with an ejection fraction of 81 % by Felipe's method of disks. There are no regional wall motion abnormalities seen.   2. Mild left ventricular hypertrophy.   3. There is moderate (grade 2) left ventricular diastolic dysfunction.   4. Enlarged right ventricular cavity size and mild to moderately reduced right ventricular systolic function. Tricuspid annular plane systolic excursion (TAPSE) is 1.1 cm (normal >=1.7 cm). Tricuspid annular tissue Doppler S' is 10.0 cm/s (normal >10 cm/s).   5. Moderate mitral regurgitation.   6. Estimated pulmonary artery systolic pressure is 46 mmHg.   7. No significant valvular disease.   8. The inferior vena cava is normal in size (normal <2.1cm) with normal inspiratory collapse (normal >50%) consistent with normal right atrial pressure (~3, range 0-5mmHg).   9. Compared to the transthoracic echocardiogram performed on 7/3/2024, there have been no significant interval changes.

## 2025-06-19 NOTE — PROGRESS NOTE ADULT - ASSESSMENT
Shirley is a 73 yo F w/ asthma (chronic methypred 8mg PO daily), bronchiectasis, allergic rhinitis, recurrent PNA, SDB ( on CPAP), tracheomalacia s/p tracheoplasty, tracheobronchomalacia, pAfib (Eliquis), colorectal ca s/p colostomy, aortic dissection s/p ascending aorta repair who p/w cough, SOB and hemoptysis.     #SOB: Presented to OP Pulm office on 6/2 and those cultures were growing MRSA  Blood cx here positive for MRSA as well with clearance of blood cultures on 6/11  SOB thought to be 2/2 PNA/COPD exacerbation and pt feels markedly improved after treatment. Remains on RA this AM  Inhalers as per pulm   Steroids being decreased back to home dose     #MRSA bacteremia: Blood cx as noted above. Repeat cultures on 6/11 negative  S/p SAFIA without vegetations however showing severe MR  SAFIA seems to suggest extension of her chronic dissection   Reviewed images and recent CTs with Dr. Cabrera (her surgeon from 2022), no need for any intervention or repeat imaging   Severe mr has now been documented on SAFIA with flow reversal in the pulm veins.   Appreciate Structural team consultation     #PVCs: On chronic mexiletine therapy, continue   - Tele monitoring     #Dissection s/p repair: type A dissection s/p bioAVR and Bental in 2022 then severe AS s/p TAVR 9/2023:  - On Labetalol at home, currently on hold   -bp well controlled on present meds including nifedipine  - Given BPs, would initiate low dose Metoprolol instead of Labetalol. Initiate Lopressor 12.5mg BID

## 2025-06-20 ENCOUNTER — NON-APPOINTMENT (OUTPATIENT)
Age: 75
End: 2025-06-20

## 2025-06-20 ENCOUNTER — TRANSCRIPTION ENCOUNTER (OUTPATIENT)
Age: 75
End: 2025-06-20

## 2025-06-20 LAB
ANION GAP SERPL CALC-SCNC: 13 MMOL/L — SIGNIFICANT CHANGE UP (ref 5–17)
BUN SERPL-MCNC: 12 MG/DL — SIGNIFICANT CHANGE UP (ref 7–23)
CALCIUM SERPL-MCNC: 8.8 MG/DL — SIGNIFICANT CHANGE UP (ref 8.4–10.5)
CHLORIDE SERPL-SCNC: 105 MMOL/L — SIGNIFICANT CHANGE UP (ref 96–108)
CO2 SERPL-SCNC: 23 MMOL/L — SIGNIFICANT CHANGE UP (ref 22–31)
CREAT SERPL-MCNC: 0.67 MG/DL — SIGNIFICANT CHANGE UP (ref 0.5–1.3)
EGFR: 91 ML/MIN/1.73M2 — SIGNIFICANT CHANGE UP
EGFR: 91 ML/MIN/1.73M2 — SIGNIFICANT CHANGE UP
GLUCOSE BLDC GLUCOMTR-MCNC: 157 MG/DL — HIGH (ref 70–99)
GLUCOSE BLDC GLUCOMTR-MCNC: 192 MG/DL — HIGH (ref 70–99)
GLUCOSE BLDC GLUCOMTR-MCNC: 244 MG/DL — HIGH (ref 70–99)
GLUCOSE BLDC GLUCOMTR-MCNC: 246 MG/DL — HIGH (ref 70–99)
GLUCOSE SERPL-MCNC: 188 MG/DL — HIGH (ref 70–99)
HCT VFR BLD CALC: 39.6 % — SIGNIFICANT CHANGE UP (ref 34.5–45)
HGB BLD-MCNC: 12.1 G/DL — SIGNIFICANT CHANGE UP (ref 11.5–15.5)
MCHC RBC-ENTMCNC: 23.9 PG — LOW (ref 27–34)
MCHC RBC-ENTMCNC: 30.6 G/DL — LOW (ref 32–36)
MCV RBC AUTO: 78.3 FL — LOW (ref 80–100)
NRBC # BLD AUTO: 0 K/UL — SIGNIFICANT CHANGE UP (ref 0–0)
NRBC # FLD: 0 K/UL — SIGNIFICANT CHANGE UP (ref 0–0)
NRBC BLD AUTO-RTO: 0 /100 WBCS — SIGNIFICANT CHANGE UP (ref 0–0)
PLATELET # BLD AUTO: 232 K/UL — SIGNIFICANT CHANGE UP (ref 150–400)
PMV BLD: 9.8 FL — SIGNIFICANT CHANGE UP (ref 7–13)
POTASSIUM SERPL-MCNC: 4.3 MMOL/L — SIGNIFICANT CHANGE UP (ref 3.5–5.3)
POTASSIUM SERPL-SCNC: 4.3 MMOL/L — SIGNIFICANT CHANGE UP (ref 3.5–5.3)
RBC # BLD: 5.06 M/UL — SIGNIFICANT CHANGE UP (ref 3.8–5.2)
RBC # FLD: 17 % — HIGH (ref 10.3–14.5)
SODIUM SERPL-SCNC: 141 MMOL/L — SIGNIFICANT CHANGE UP (ref 135–145)
WBC # BLD: 8.03 K/UL — SIGNIFICANT CHANGE UP (ref 3.8–10.5)
WBC # FLD AUTO: 8.03 K/UL — SIGNIFICANT CHANGE UP (ref 3.8–10.5)

## 2025-06-20 PROCEDURE — 99232 SBSQ HOSP IP/OBS MODERATE 35: CPT

## 2025-06-20 PROCEDURE — 99233 SBSQ HOSP IP/OBS HIGH 50: CPT

## 2025-06-20 PROCEDURE — G0545: CPT

## 2025-06-20 RX ORDER — TRAMADOL HYDROCHLORIDE 50 MG/1
25 TABLET, FILM COATED ORAL ONCE
Refills: 0 | Status: DISCONTINUED | OUTPATIENT
Start: 2025-06-20 | End: 2025-06-20

## 2025-06-20 RX ADMIN — Medication 1 APPLICATION(S): at 05:18

## 2025-06-20 RX ADMIN — Medication 1 DROP(S): at 12:50

## 2025-06-20 RX ADMIN — Medication 1 DROP(S): at 05:17

## 2025-06-20 RX ADMIN — Medication 4 MILLILITER(S): at 18:18

## 2025-06-20 RX ADMIN — ROSUVASTATIN CALCIUM 5 MILLIGRAM(S): 20 TABLET, FILM COATED ORAL at 21:52

## 2025-06-20 RX ADMIN — MEXILETINE HYDROCHLORIDE 200 MILLIGRAM(S): 250 CAPSULE ORAL at 21:53

## 2025-06-20 RX ADMIN — SERTRALINE 50 MILLIGRAM(S): 100 TABLET, FILM COATED ORAL at 12:50

## 2025-06-20 RX ADMIN — METHYLPREDNISOLONE ACETATE 8 MILLIGRAM(S): 80 INJECTION, SUSPENSION INTRA-ARTICULAR; INTRALESIONAL; INTRAMUSCULAR; SOFT TISSUE at 05:14

## 2025-06-20 RX ADMIN — Medication 3 MILLIGRAM(S): at 21:54

## 2025-06-20 RX ADMIN — Medication 2.5 MILLIGRAM(S): at 18:18

## 2025-06-20 RX ADMIN — BUDESONIDE 1 MILLIGRAM(S): 0.25 SUSPENSION RESPIRATORY (INHALATION) at 18:18

## 2025-06-20 RX ADMIN — MEXILETINE HYDROCHLORIDE 200 MILLIGRAM(S): 250 CAPSULE ORAL at 16:05

## 2025-06-20 RX ADMIN — TRAMADOL HYDROCHLORIDE 25 MILLIGRAM(S): 50 TABLET, FILM COATED ORAL at 03:56

## 2025-06-20 RX ADMIN — INSULIN LISPRO 4 UNIT(S): 100 INJECTION, SOLUTION INTRAVENOUS; SUBCUTANEOUS at 12:36

## 2025-06-20 RX ADMIN — INSULIN LISPRO 1: 100 INJECTION, SOLUTION INTRAVENOUS; SUBCUTANEOUS at 18:17

## 2025-06-20 RX ADMIN — Medication 2 TABLET(S): at 21:52

## 2025-06-20 RX ADMIN — INSULIN LISPRO 4 UNIT(S): 100 INJECTION, SOLUTION INTRAVENOUS; SUBCUTANEOUS at 18:17

## 2025-06-20 RX ADMIN — LEVETIRACETAM 1000 MILLIGRAM(S): 10 INJECTION, SOLUTION INTRAVENOUS at 05:14

## 2025-06-20 RX ADMIN — LACOSAMIDE 100 MILLIGRAM(S): 150 TABLET, FILM COATED ORAL at 18:16

## 2025-06-20 RX ADMIN — LACOSAMIDE 100 MILLIGRAM(S): 150 TABLET, FILM COATED ORAL at 05:14

## 2025-06-20 RX ADMIN — Medication 2.5 MILLIGRAM(S): at 12:49

## 2025-06-20 RX ADMIN — INSULIN LISPRO 2: 100 INJECTION, SOLUTION INTRAVENOUS; SUBCUTANEOUS at 12:35

## 2025-06-20 RX ADMIN — Medication 1 DROP(S): at 18:12

## 2025-06-20 RX ADMIN — Medication 1 DOSE(S): at 18:19

## 2025-06-20 RX ADMIN — TIOTROPIUM BROMIDE INHALATION SPRAY 2 PUFF(S): 3.12 SPRAY, METERED RESPIRATORY (INHALATION) at 12:50

## 2025-06-20 RX ADMIN — Medication 1 APPLICATION(S): at 12:49

## 2025-06-20 RX ADMIN — INSULIN LISPRO 2: 100 INJECTION, SOLUTION INTRAVENOUS; SUBCUTANEOUS at 08:11

## 2025-06-20 RX ADMIN — INSULIN LISPRO 2 UNIT(S): 100 INJECTION, SOLUTION INTRAVENOUS; SUBCUTANEOUS at 08:11

## 2025-06-20 RX ADMIN — FORMOTEROL FUMARATE DIHYDRATE 20 MICROGRAM(S): 20 SOLUTION RESPIRATORY (INHALATION) at 18:21

## 2025-06-20 RX ADMIN — INSULIN GLARGINE-YFGN 20 UNIT(S): 100 INJECTION, SOLUTION SUBCUTANEOUS at 21:52

## 2025-06-20 RX ADMIN — MEXILETINE HYDROCHLORIDE 200 MILLIGRAM(S): 250 CAPSULE ORAL at 05:15

## 2025-06-20 RX ADMIN — POLYETHYLENE GLYCOL 3350 17 GRAM(S): 17 POWDER, FOR SOLUTION ORAL at 12:49

## 2025-06-20 RX ADMIN — Medication 250 MILLIGRAM(S): at 18:16

## 2025-06-20 RX ADMIN — Medication 20 MILLIGRAM(S): at 12:50

## 2025-06-20 RX ADMIN — APIXABAN 5 MILLIGRAM(S): 2.5 TABLET, FILM COATED ORAL at 05:14

## 2025-06-20 RX ADMIN — MONTELUKAST SODIUM 10 MILLIGRAM(S): 10 TABLET ORAL at 21:53

## 2025-06-20 RX ADMIN — LEVETIRACETAM 1000 MILLIGRAM(S): 10 INJECTION, SOLUTION INTRAVENOUS at 18:16

## 2025-06-20 RX ADMIN — Medication 40 MILLIGRAM(S): at 05:14

## 2025-06-20 RX ADMIN — Medication 250 MILLIGRAM(S): at 05:15

## 2025-06-20 RX ADMIN — Medication 30 MILLILITER(S): at 12:50

## 2025-06-20 RX ADMIN — APIXABAN 5 MILLIGRAM(S): 2.5 TABLET, FILM COATED ORAL at 18:17

## 2025-06-20 RX ADMIN — Medication 60 MILLIGRAM(S): at 05:15

## 2025-06-20 NOTE — DISCHARGE NOTE PROVIDER - ATTENDING DISCHARGE PHYSICAL EXAMINATION:
GENERAL: NAD, breathing not labored   EYES: conjunctiva and sclera clear  NECK: supple, No JVD  CHEST/LUNG: b/l rhonchi  HEART: S1 S2 RRR  ABDOMEN: +BS Soft, NT/ND  EXTREMITIES: no LE edema  NEUROLOGY: AAOx3, no FND

## 2025-06-20 NOTE — DISCHARGE NOTE NURSING/CASE MANAGEMENT/SOCIAL WORK - NSDCFUADDAPPT_GEN_ALL_CORE_FT
APPTS ARE READY TO BE MADE: [X] YES    Best Family or Patient Contact (if needed):    Additional Information about above appointments (if needed):    1: Follow up with primary care physician in 1 week.  2: Follow up with infectious disease Dr. Ramirez in 2 weeks.  3: Follow up with pulmonology Dr. Allison in 2-3 weeks.  4: Follow up with cardiology Dr. Waller in 2-3 weeks.   5: Follow up with structural heart specialist Dr. Leahy in 2-3 weeks.     Other comments or requests:

## 2025-06-20 NOTE — PROGRESS NOTE ADULT - SUBJECTIVE AND OBJECTIVE BOX
Follow Up:  MRSA bacteremic pneumonia    Interval History/ROS:  no fever. tired.  ROS otherwise negative.    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 by Dr Zapien  S/P bronchoscopy 6/5/2018 - [place] (Dr Zapien) no evidence of tracheobronchomalacia in trachea or bronchial tubes  Rectal bleeding exam under anesthesia (ASU) 2/2018  S/P TAVR (transcatheter aortic valve replacement)  S/P colostomy  S/P AVR (aortic valve replacement)    Allergies  Valium (Short breath)  OxyContin (Unknown)  Valium (Unknown)  Avelox (Short breath; Pruritus)  penicillin (Anaphylaxis)  Dilaudid (Short breath)  shellfish (Anaphylaxis)  ampicillin (Unknown)  codeine (Short breath)  Percocet (Unknown)  tetanus toxoid (Short breath)  cefepime (Anaphylaxis)  cefepime (Short breath (Severe))  codeine (Unknown)  iodine (Short breath; Swelling)  aspirin (Short breath)  meropenem (Unknown)    ANTIMICROBIALS:    vancomycin  IVPB 750 every 12 hours    MEDICATIONS  (STANDING):  albuterol    0.083% 2.5 every 6 hours  apixaban 5 two times a day  budesonide    Inhalation Suspension 1 two times a day  famotidine    Tablet 20 daily  fluticasone propionate/ salmeterol 250-50 MICROgram(s) Diskus 1 two times a day  formoterol for nebulization 20 every 12 hours  glucagon  Injectable 1 once  insulin glargine Injectable (LANTUS) 20 at bedtime  insulin lispro (ADMELOG) corrective regimen sliding scale  Before meals and at bedtime  insulin lispro Injectable (ADMELOG) 2 before breakfast  insulin lispro Injectable (ADMELOG) 4 before lunch  insulin lispro Injectable (ADMELOG) 4 before dinner  lacosamide 100 two times a day  levETIRAcetam 1000 two times a day  methylPREDNISolone 8 daily  mexiletine 200 three times a day  mineral oil 30 daily  montelukast 10 at bedtime  NIFEdipine XL 60 daily  pantoprazole    Tablet 40 before breakfast  polyethylene glycol 3350 17 daily  rosuvastatin 5 at bedtime  senna 2 at bedtime  sertraline 50 daily  sodium chloride 7% Inhalation 4 every 12 hours  tiotropium 2.5 MICROgram(s) Inhaler 2 daily    Vital Signs Last 24 Hrs  T(F): 98.1 (06-20-25 @ 10:59), Max: 98.1 (06-19-25 @ 16:48)  HR: 69 (06-20-25 @ 10:59)  BP: 122/74 (06-20-25 @ 10:59)  RR: 18 (06-20-25 @ 10:59)  SpO2: 100% (06-20-25 @ 10:59) (96% - 100%)  Wt(kg): --    PHYSICAL EXAM:  Constitutional: non-toxic  HEAD/EYES: anicteric  ENT:  supple  Cardiovascular:   normal S1, S2  Respiratory:  clear BS bilaterally  GI:  soft, non-tender, normal bowel sounds  :  no ramirez  Musculoskeletal:  no synovitis  Neurologic: awake and alert, normal strength, no focal findings  Skin:  no rash  Psychiatric:  awake, alert, appropriate mood                                   12.1   8.03  )-----------( 232      ( 20 Jun 2025 06:25 )             39.6 06-20    141  |  105  |  12  ----------------------------<  188  4.3   |  23  |  0.67  Ca    8.8      20 Jun 2025 06:26    MICROBIOLOGY:  Vancomycin Level, Trough: 11.1 (06-16 @ 06:44)  Vancomycin Level, Trough: 10.9 (06-15 @ 09:32)  Vancomycin Level, Trough: 10.5 (06-13 @ 12:23)  Vancomycin Level, Trough: 4.5 (06-11 @ 08:24)    6/13 BC (-)  6/11 BC (+) MRSA  6/10 Sputum cx (+) MRSA  6/9 UC (-)  6/9 BC (+) MRSA  6/9 Sputum cx (+) MRSA    RADIOLOGY:  below radiology personally reviewed and agree with finding    CT Abdomen and Pelvis w/ IV Cont (06.12.25 @ 15:12) >  IMPRESSION:  Patchy cortical hypoenhancement in the bilateral renal lower poles, possibly pyelonephritis.  No abscess.    MR Lumbar Spine w/wo IV Cont (06.11.25 @ 10:44) >  IMPRESSION:  1. No evidence of discitis-osteomyelitis, epidural or paraspinal abscess.  2. Stable grade 1 anterolisthesis L3 over L4 with severe bilateral L3-L4 facet arthrosis.  3. L3-L4 anterolisthesis with a stable left foraminal-lateral disc protrusion superimposed upon a disc bulge, resulting in moderate central canal and mild left neural foramen stenosis with facet arthrosis and ligamentum flavum hypertrophy, crowding the nerve roots of the cauda equina. Left foraminal-lateral annular tear.  4. L4-L5 stable right foraminal-lateral disc protrusion superimposed upon a disc bulge, resulting in mild central canal, bilateral lateral recess, moderate right and mild left neural foramen stenosis with facet arthrosis and ligamentum flavum hypertrophy, contacting the right L4 and L5 nerve   roots.  5. L5-S1 stable disc bulge-spondylitic ridge complex, resulting in mild central canal and severe bilateral neural foramen stenosis with facet arthrosis, impinging upon bilateral L5 nerve roots.  6. Additional findings, including those degenerative, described in detail above.    CT Chest No Cont (06.09.25 @ 15:14) >  IMPRESSION:  Multifocal pneumonia. Recommend follow-up chest CT in 3 months to ensure clearance.        ECHO:  SAFIA W or WO Ultrasound Enhancing Agent (06.17.25 @ 11:11) >  CONCLUSIONS:   1. Left ventricular systolic function is normal with an ejection fraction visually estimated at 65 to 70 %.   2. Normal right ventricular cavity size and normal right ventricular systolic function.   3. No echocardiographic evidence of vegetations.   4. Prolapse of the anterior mitral leaflet.   5. Severe mitral regurgitation. The mitral regurgitant jet is posteriorly directed. Mechanism of mitral regurgitation: Porsha Type II (excessive leaflet motion with prolapse). Systolic flow reversal in the pulmonary veins, which is consistent with severe mitral regurgitation.   6. A Transcatheter deployed (TAVR) valve replacement is present in the aortic position TAVR valve The prosthetic valve has normal leaflet excursion . No prosthetic aortic stenosis. No intravalvular regurgitation No paravalvular regurgitation.   7. TAVR valve well seated, no evidence of vegetations or abscess.   8. Known type A chronic aortic dissection is seen.   9. Aortic dissection seen at the a dissection flap with a false lumen is present in the descending aorta that extends to the aortic arch.  10. No evidence of left atrial or left atrial appendage thrombus.  11. Compared to the transesophageal echocardiogram performed on 8/30/2023, the aortic dissectionapears to have extended.    TTE W or WO Ultrasound Enhancing Agent (06.11.25 @ 11:42) >  CONCLUSIONS:   1. Left ventricular systolic function is hyperdynamic with an ejection fraction of 81 % by Felipe's method of disks. There are no regional wall motion abnormalities seen.   2. Mild left ventricular hypertrophy.   3. There is moderate (grade 2) left ventricular diastolic dysfunction.   4. Enlarged right ventricular cavity size and mild to moderately reduced right ventricular systolic function. Tricuspid annular plane systolic excursion (TAPSE) is 1.1 cm (normal >=1.7 cm). Tricuspid annular tissue Doppler S' is 10.0 cm/s (normal >10 cm/s).   5. Moderate mitral regurgitation.   6. Estimated pulmonary artery systolic pressure is 46 mmHg.   7. No significant valvular disease.   8. The inferior vena cava is normal in size (normal <2.1cm) with normal inspiratory collapse (normal >50%) consistent with normal right atrial pressure (~3, range 0-5mmHg).   9. Compared to the transthoracic echocardiogram performed on 7/3/2024, there have been no significant interval changes.

## 2025-06-20 NOTE — DISCHARGE NOTE NURSING/CASE MANAGEMENT/SOCIAL WORK - NSDCPEELIQUISCOMP_GEN_ALL_CORE
US order entered.  US scheduled 3/19 @ [id number].  Follow up with TG 3/19 @ 1230.  Pt updated on the above.  Directions given.  Pt verbalizes understanding.   Apixaban/Eliquis is used to treat and prevent blood clots. If you are not able to swallow the tablets whole, they may be crushed and mixed in water, apple juice, or applesauce and promptly taken within four hours. Never skip a dose of Apixaban/Eliquis. If you forget to take your Apixaban/Eliquis, take a dose as soon as you remember. If it is almost time for your next Apixaban/Eliquis dose, wait until then and take a regular dose. DO NOT take an extra pill to ‘catch up’.  NEVER TAKE A DOUBLE DOSE. Notify your doctor that you missed a dose. Take Apixaban/Eliquis at the same time each morning and evening. Apixaban/Eliquis may be taken with other medication or food.

## 2025-06-20 NOTE — DISCHARGE NOTE NURSING/CASE MANAGEMENT/SOCIAL WORK - PATIENT PORTAL LINK FT
You can access the FollowMyHealth Patient Portal offered by [facility] by registering at the following website: [email or web address]. By joining [facility]’s FollowMyHealth portal, you will also be able to view your health information using other applications (apps) compatible with our system.

## 2025-06-20 NOTE — PROGRESS NOTE ADULT - PROBLEM SELECTOR PLAN 6
Continue home mexiletine.  restarted eliquis - hemoptysis improved        needs Genetic testing per Cardiology, d/w rEick Romero

## 2025-06-20 NOTE — PROGRESS NOTE ADULT - SUBJECTIVE AND OBJECTIVE BOX
CHIEF COMPLAINT: sob    Interval Events: Patient seen and examined at bedside. No acute events overnight. Patient reports she is doing well, s/p PICC placement. reports some tenderness at insertion site.     REVIEW OF SYSTEMS:  Constitutional: [X ] negative [ ] fevers [ ] chills [ ] weight loss [ ] weight gain  HEENT: [X ] negative [ ] dry eyes [ ] eye irritation [ ] postnasal drip [ ] nasal congestion  CV: [X ] negative  [ ] chest pain [ ] orthopnea [ ] palpitations [ ] murmur  Resp: [X ] negative [ ] cough [ ] shortness of breath [ ] dyspnea [ ] wheezing [ ] sputum [ ] hemoptysis  GI: [ X] negative [ ] nausea [ ] vomiting [ ] diarrhea [ ] constipation [ ] abd pain [ ] dysphagia   : X[ ] negative [ ] dysuria [ ] nocturia [ ] hematuria [ ] increased urinary frequency  Musculoskeletal: [ ] negative [ ] back pain [ ] myalgias [ ] arthralgias [ ] fracture  Skin: [ ] negative [ ] rash [ ] itch  Neurological: [ ] negative [ ] headache [ ] dizziness [ ] syncope [ ] weakness [ ] numbness  Psychiatric: [ ] negative [ ] anxiety [ ] depression  Endocrine: [ ] negative [ ] diabetes [ ] thyroid problem  Hematologic/Lymphatic: [ ] negative [ ] anemia [ ] bleeding problem  Allergic/Immunologic: [ ] negative [ ] itchy eyes [ ] nasal discharge [ ] hives [ ] angioedema  [ ] All other systems negative  [ ] Unable to assess ROS because ________    OBJECTIVE:  ICU Vital Signs Last 24 Hrs  T(C): 36.6 (20 Jun 2025 04:39), Max: 36.9 (19 Jun 2025 11:04)  T(F): 97.9 (20 Jun 2025 04:39), Max: 98.4 (19 Jun 2025 11:04)  HR: 61 (20 Jun 2025 09:19) (61 - 65)  BP: 110/63 (20 Jun 2025 04:39) (96/61 - 110/63)  BP(mean): --  ABP: --  ABP(mean): --  RR: 18 (20 Jun 2025 04:39) (17 - 18)  SpO2: 97% (20 Jun 2025 04:39) (96% - 98%)    O2 Parameters below as of 20 Jun 2025 04:39  Patient On (Oxygen Delivery Method): room air              06-19 @ 07:01  -  06-20 @ 07:00  --------------------------------------------------------  IN: 0 mL / OUT: 800 mL / NET: -800 mL      CAPILLARY BLOOD GLUCOSE      POCT Blood Glucose.: 246 mg/dL (20 Jun 2025 07:42)      PHYSICAL EXAM:  General:   HEENT:   Lymph Nodes:  Neck:   Respiratory:   Cardiovascular:   Abdomen:   Extremities:   Skin:   Neurological:  Psychiatry:    HOSPITAL MEDICATIONS:  apixaban 5 milliGRAM(s) Oral two times a day    vancomycin  IVPB 750 milliGRAM(s) IV Intermittent every 12 hours    mexiletine 200 milliGRAM(s) Oral three times a day  NIFEdipine XL 60 milliGRAM(s) Oral daily    dextrose 50% Injectable 25 Gram(s) IV Push once  dextrose 50% Injectable 12.5 Gram(s) IV Push once  dextrose 50% Injectable 25 Gram(s) IV Push once  dextrose Oral Gel 15 Gram(s) Oral once PRN  glucagon  Injectable 1 milliGRAM(s) IntraMuscular once  insulin glargine Injectable (LANTUS) 20 Unit(s) SubCutaneous at bedtime  insulin lispro (ADMELOG) corrective regimen sliding scale   SubCutaneous Before meals and at bedtime  insulin lispro Injectable (ADMELOG) 2 Unit(s) SubCutaneous before breakfast  insulin lispro Injectable (ADMELOG) 4 Unit(s) SubCutaneous before lunch  insulin lispro Injectable (ADMELOG) 4 Unit(s) SubCutaneous before dinner  methylPREDNISolone 8 milliGRAM(s) Oral daily  rosuvastatin 5 milliGRAM(s) Oral at bedtime    albuterol    0.083% 2.5 milliGRAM(s) Nebulizer every 6 hours  budesonide    Inhalation Suspension 1 milliGRAM(s) Inhalation two times a day  fluticasone propionate/ salmeterol 250-50 MICROgram(s) Diskus 1 Dose(s) Inhalation two times a day  formoterol for nebulization 20 MICROGram(s) Nebulizer every 12 hours  montelukast 10 milliGRAM(s) Oral at bedtime  sodium chloride 7% Inhalation 4 milliLiter(s) Inhalation every 12 hours  tiotropium 2.5 MICROgram(s) Inhaler 2 Puff(s) Inhalation daily    lacosamide 100 milliGRAM(s) Oral two times a day  levETIRAcetam 1000 milliGRAM(s) Oral two times a day  melatonin 3 milliGRAM(s) Oral at bedtime PRN  ondansetron Injectable 4 milliGRAM(s) IV Push every 8 hours PRN  sertraline 50 milliGRAM(s) Oral daily    aluminum hydroxide/magnesium hydroxide/simethicone Suspension 30 milliLiter(s) Oral every 6 hours PRN  famotidine    Tablet 20 milliGRAM(s) Oral daily  lactulose Syrup 10 Gram(s) Oral daily PRN  magnesium hydroxide Suspension 30 milliLiter(s) Oral daily PRN  mineral oil 30 milliLiter(s) Oral daily  pantoprazole    Tablet 40 milliGRAM(s) Oral before breakfast  polyethylene glycol 3350 17 Gram(s) Oral daily  senna 2 Tablet(s) Oral at bedtime        dextrose 5%. 1000 milliLiter(s) IV Continuous <Continuous>  dextrose 5%. 1000 milliLiter(s) IV Continuous <Continuous>  sodium chloride 0.9% lock flush 10 milliLiter(s) IV Push every 1 hour PRN  sodium chloride 0.9%. 1000 milliLiter(s) IV Continuous <Continuous>      artificial  tears Solution 1 Drop(s) Both EYES every 4 hours  chlorhexidine 2% Cloths 1 Application(s) Topical daily  chlorhexidine 4% Liquid 1 Application(s) Topical <User Schedule>  silver sulfADIAZINE 1% Cream 1 Application(s) Topical daily    Ohtuvayre 3 mg/2.5 mL Inhalation 3 milliGRAM(s) 3 milliGRAM(s) Nebulizer every 12 hours      LABS:                        12.1   8.03  )-----------( 232      ( 20 Jun 2025 06:25 )             39.6     Hgb Trend: 12.1<--, 12.7<--, 13.0<--, 13.6<--, 12.9<--  06-20    141  |  105  |  12  ----------------------------<  188[H]  4.3   |  23  |  0.67    Ca    8.8      20 Jun 2025 06:26      Creatinine Trend: 0.67<--, 0.66<--, 0.61<--, 0.70<--, 0.70<--, 0.64<--    Urinalysis Basic - ( 20 Jun 2025 06:26 )    Color: x / Appearance: x / SG: x / pH: x  Gluc: 188 mg/dL / Ketone: x  / Bili: x / Urobili: x   Blood: x / Protein: x / Nitrite: x   Leuk Esterase: x / RBC: x / WBC x   Sq Epi: x / Non Sq Epi: x / Bacteria: x            MICROBIOLOGY:     RADIOLOGY:  [ ] Reviewed and interpreted by me    PULMONARY FUNCTION TESTS:    EKG:

## 2025-06-20 NOTE — PROGRESS NOTE ADULT - PROBLEM SELECTOR PLAN 1
--Met SIRS with leukocytosis and tachpnyea (RR 22) on initial presentation. Present on admission.   --Multifocal Pneumonia on Imaging.   --Bacteremia- MRSA 3/4 bottles, repeat 6/11 1/4+GPC in clusters, repeat bx q48 hours     TTE without endocarditis - SAFIA done no vegetations but severe MR seen, structural said outpt f/u, no plan for surgery while on IV abx.  monitor repeat blood cultures > NGTD f/u with final results   c/w vancomycin - monitor trough, dosing adjusted per ID - f/u regarding duration of antibiotics  ID and pulmonology following  Patient with many antibiotic allergies  s/p PICC line for long term antibiotics

## 2025-06-20 NOTE — PROVIDER CONTACT NOTE (OTHER) - SITUATION
Patient refused am Labs
pt refusing home nocturnal CPAP
Sacrum 3x3 DTI, Left heel 2x2 DTI, Left elbow 2x2 DTI, Right elbow 5x5 DTI. Left lateral shin nonhealing wound 2x2, Right inner foot s/p OM surgery wound
pt with R external Jugular IV from 6/9 refusing new IV placement
pt complaining of pain at PICC site
Pt c/o 4/10 midsternal chest pain

## 2025-06-20 NOTE — PROVIDER CONTACT NOTE (OTHER) - REASON
Patient refused am Labs
pt complaining of pain at PICC site
pt with R external Jugular IV from 6/9 refusing new IV placement
Pt c/o 4/10 midsternal chest pain
Sacrum 3x3 DTI, Left heel 2x2 DTI, Left elbow 2x2 DTI, Right elbow 5x5 DTI. Left lateral shin nonhealing wound 2x2, Right inner foot s/p OM surgery wound
pt refusing home nocturnal CPAP

## 2025-06-20 NOTE — DISCHARGE NOTE PROVIDER - PROVIDER TOKENS
PROVIDER:[TOKEN:[[id number]],FOLLOWUP:[2 weeks]],PROVIDER:[TOKEN:[[id number]],FOLLOWUP:[2 weeks],ESTABLISHEDPATIENT:[T]],PROVIDER:[TOKEN:[[id number]],FOLLOWUP:[2 weeks]],PROVIDER:[TOKEN:[[id number]],FOLLOWUP:[2 weeks]]

## 2025-06-20 NOTE — DISCHARGE NOTE PROVIDER - HOSPITAL COURSE
HPI:  73 yo F with a PMH of Epilepsy on Keppra/lacosamide, COPD, asthma  (on CPAP at home, on chronic steroids), DM2, bronchiectasis, tracheomalacia s/p tracheloplasty, HTN, Hx of dissection of descending thoracic aorta, hx of aortic aneurysm, Type B dissection (7/2024), medically managed initially as she did not meet criteria for surgery at that time), evaluated by Dr. 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     History obtained from daughter, Zoe, as patient not responding to questions, those eyes open spontaneously to voice and light touch.   She reports that patient intermittently confused since most recent hospitalization.  She noted worsening productive cough. Also with N/V- non-bloody, non-bilious. No fevers at home. She was recently started on Bactrim by her outpatient pulmonologist, Dr. Allison, for MRSA in sputum.    Per report, patient had ?hemoptysis during CT chest, however, not collected.  (09 Jun 2025 15:21)    Hospital Course:  74-year-old female with a complex medical history including epilepsy, COPD, asthma, type 2 diabetes mellitus, bronchiectasis, tracheomalacia status-post tracheoplasty, hypertension, history of type A and B aortic dissections (status-post bioAVR with Bentall procedure and medically managed, respectively), severe aortic stenosis status-post TAVR, TIAs, DVT, atrial fibrillation, colon cancer status-post resection with colostomy, neuropathy, and recent hospitalization for hallucinations and falls, was admitted for multifocal pneumonia with MRSA bacteremia and sepsis.  TTE and SAFIA ruled out endocarditis, but revealed severe mitral regurgitation for which outpatient cardiology and structural heart specialist follow-up was recommended.  Antibiotic therapy with vancomycin was initiated via PICC line. Infectious Disease and Pulmonology were consulted. Her COPD/asthma exacerbation was managed with adjustments to her home respiratory regimen including albuterol, Perforomist, Ohtuvayre, Pulmicort, Advair, Spiriva, Tezspire, and chest physiotherapy with her home vest and CPAP. Prednisone was temporarily increased.  Anticoagulation with Eliquis was restarted as hemoptysis resolved.  Mexiletine was continued for atrial fibrillation.  Hypertension was managed with nifedipine, with labetalol held due to bradycardia. Patient to follow up with cardiology and structural heart as an outpatient regarding her mitral regurgitation. Patient is medically stable to be discharged as per attending Dr. Meek.     Important Medication Changes and Reason:    Active or Pending Issues Requiring Follow-up:  PCP, cards, structural hx, ID, pulm   Advanced Directives:   [ ] Full code  [ ] DNR  [ ] Hospice    Discharge Diagnoses:  sepsis  severe MVR  DM  Afib  seizure disorder  COPD

## 2025-06-20 NOTE — DISCHARGE NOTE PROVIDER - NSDCFUSCHEDAPPT_GEN_ALL_CORE_FT
[facility]  PULMMED [address] [facility]  Scheduled Appointment: 07/02/2025    Afshan Stout  [facility]  INFDISEASE [address]  Scheduled Appointment: 07/14/2025    Martin Kamara  [facility]  [facility]  Scheduled Appointment: 08/01/2025    Annabella Ball  [facility]  CTSURG [address]  Scheduled Appointment: 08/11/2025    Steven Leahy  [facility]  CTSURG [address]  Scheduled Appointment: 08/11/2025    Erick Romero  [facility]  CARDIOLOGY [address]  Scheduled Appointment: 08/20/2025    [facility]  CARDIOLOGY [address]  Scheduled Appointment: 08/20/2025    Moreno Panchal  [facility]  ENDOCRIN [address]  Scheduled Appointment: 09/04/2025     [facility] Physician [facility]  PULMMED [address] [facility]  Scheduled Appointment: 07/02/2025    Afshan Stout  [facility]  INFDISEASE [address]  Scheduled Appointment: 07/14/2025    Martin Kamara  [facility]  [facility]  Scheduled Appointment: 08/01/2025    Annabella Ball  [facility]  CTSURG [address]  Scheduled Appointment: 08/11/2025    Steven Leahy  [facility]  CTSURG [address]  Scheduled Appointment: 08/11/2025    Erick Romero  [facility]  CARDIOLOGY [address]  Scheduled Appointment: 08/20/2025    [facility]  CARDIOLOGY [address]  Scheduled Appointment: 08/20/2025    Moreno Panchal  [facility] Physician [facility]  ENDOCRIN [address] [facility] [address]  Scheduled Appointment: 09/04/2025    Albert Aguilar  [facility]  OTOLARYNG [address]  Scheduled Appointment: 09/19/2025     Hilda Burt  [facility] Physician [facility]  [address]ULMMED [address]  Scheduled Appointment: 06/24/2025    [facility]  PULMMED [address] [facility]  Scheduled Appointment: 07/02/2025    Afshan Stout  [facility]  INFDISEASE [address]  Scheduled Appointment: 07/14/2025    Annabella Ball  [facility]  CTSURG [address]  Scheduled Appointment: 08/11/2025    Steven Leahy  [facility]  CTSURG [address]  Scheduled Appointment: 08/11/2025    Erick Romero  [facility]  CARDIOLOGY [address]  Scheduled Appointment: 08/20/2025    [facility]  CARDIOLOGY [address]  Scheduled Appointment: 08/20/2025    Moreno Panchal  [facility] Physician [facility]  ENDOCRIN [address] [facility] [address]  Scheduled Appointment: 09/04/2025    Albert Aguilar  [facility]  [address]ARYNG [address]  Scheduled Appointment: 09/19/2025

## 2025-06-20 NOTE — DISCHARGE NOTE PROVIDER - CARE PROVIDERS DIRECT ADDRESSES
[email or web address]

## 2025-06-20 NOTE — PROVIDER CONTACT NOTE (OTHER) - ACTION/TREATMENT ORDERED:
ACP MADDY JOEL notified. Wound consult ordered. Nutrition consult ordered. Plan of care ongoing.
ACP ODALYS Coto notified. Greer ordered. Plan of care ongoing.
Tramadol ordered. Will continue to monitor site.
ACP Lakshmi Bobo aware. No further interventions at this time as per ACP. Education provided about risks/benefits. Pt still refusing. Plan of care ongoing.
Patient educated on the importance of drawing labs, provider notified, and will endorse to AM nurse.
ACP Lakshmi Bobo aware. Midline order placed as per ACP. No further interventions at this time as per ACP. Plan of care ongoing.

## 2025-06-20 NOTE — DISCHARGE NOTE PROVIDER - NSDCCPCAREPLAN_GEN_ALL_CORE_FT
PRINCIPAL DISCHARGE DIAGNOSIS  Diagnosis: Sepsis  Assessment and Plan of Treatment: You were discharged with IV antibiotics via Right-sided PICC line catheter. Continue your Vancomycin 750 mg twice a day until 7/27/2025. Follow up with infectious disease Dr. Ramirez in 2 weeks.   Take all antibiotics as ordered.  Call you Health care provider upon arrival home to make a one week follow up appointment.  If you develop fever, chills, malaise, or change in mental status call your Health Care Provider or go to the Emergency Department.  Nutrition is important, eat small frequent meals to help ensure you get adequate calories.  Do not stay in bed all day!  Increase your activity daily as tolerated.        SECONDARY DISCHARGE DIAGNOSES  Diagnosis: Severe mitral valve regurgitation  Assessment and Plan of Treatment: Please follow up with structural heart specialist Dr. Leahy in 2 weeks for for a repeat echocardiogram and discussion of possible treatment options for mitral valve regurgitation. Follow up with cardiology Dr. Waller in 2-3 weeks.    Diagnosis: DM (diabetes mellitus), type 2  Assessment and Plan of Treatment:   Make sure you get your HgA1c checked every three months.  If you take oral diabetes medications, check your blood glucose two times a day.  If you take insulin, check your blood glucose before meals and at bedtime.  It's important not to skip any meals.  Keep a log of your blood glucose results and always take it with you to your doctor appointments.  Keep a list of your current medications including injectables and over the counter medications and bring this medication list with you to all your doctor appointments.  If you have not seen your ophthalmologist this year call for appointment.  Check your feet daily for redness, sores, or openings. Do not self treat. If no improvement in two days call your primary care physician for an appointment.  Low blood sugar (hypoglycemia) is a blood sugar below 70mg/dl. Check your blood sugar if you feel signs/symptoms of hypoglycemia. If your blood sugar is below 70 take 15 grams of carbohydrates (ex 4 oz of apple juice, 3-4 glucose tablets, or 4-6 oz of regular soda) wait 15 minutes and repeat blood sugar to make sure it comes up above 70.  If your blood sugar is above 70 and you are due for a meal, have a meal.  If you are not due for a meal have a snack.  This snack helps keeps your blood sugar at a safe range.      Diagnosis: Chronic atrial fibrillation  Assessment and Plan of Treatment: Atrial fibrillation is the most common heart rhythm problem.  The condition puts you at risk for has stroke and heart attack  It helps if you control your blood pressure, not drink more than 1-2 alcohol drinks per day, cut down on caffeine, getting treatment for over active thyroid gland, and get regular exercise  Call your doctor if you feel your heart racing or beating unusually, chest tightness or pain, lightheaded, faint, shortness of breath especially with exercise  It is important to take your heart medication as prescribed  You may be on anticoagulation which is very important to take as directed - you may need blood work to monitor drug levels      Diagnosis: Seizure disorder  Assessment and Plan of Treatment: Continue Keppra, and vimpat.      Diagnosis: Hypertension  Assessment and Plan of Treatment: Low salt diet  Activity as tolerated.  Take all medication as prescribed.  Follow up with your medical doctor for routine blood pressure monitoring at your next visit.  Notify your doctor if you have any of the following symptoms:   Dizziness, Lightheadedness, Blurry vision, Headache, Chest pain, Shortness of breath      Diagnosis: COPD with asthma  Assessment and Plan of Treatment: Call your Health Care provider upon arrival home to make a follow up appointment within one week.  Take all inhalers as prescribed by your Health Care Provider.  Take steroids as prescribed by your Health Care Provider.  If your cough increases infrequency and severity and/or you have shortness of breath or increased shortness of breath call your Health Care Provider.  If you develop fever, chills, night sweats, malaise, and/or change in mental status call your Health care Provider.  Nutrition is very important.  Eat small frequent meals.  Increase your activity as tolerated.  Do not stay in bed all day  Follow up with pulmonologist Dr. Allison in 2-3 weeks.     PRINCIPAL DISCHARGE DIAGNOSIS  Diagnosis: Sepsis  Assessment and Plan of Treatment: You were discharged with IV antibiotics via Right-sided PICC line catheter. Continue your Vancomycin 750 mg twice a day until 7/27/2025. Follow up with infectious disease Dr. Ramirez in 2 weeks.   Take all antibiotics as ordered.  Call you Health care provider upon arrival home to make a one week follow up appointment.  If you develop fever, chills, malaise, or change in mental status call your Health Care Provider or go to the Emergency Department.  Nutrition is important, eat small frequent meals to help ensure you get adequate calories.  Do not stay in bed all day!  Increase your activity daily as tolerated.        SECONDARY DISCHARGE DIAGNOSES  Diagnosis: COPD with asthma  Assessment and Plan of Treatment: Call your Health Care provider upon arrival home to make a follow up appointment within one week.  Take all inhalers as prescribed by your Health Care Provider.  Take steroids as prescribed by your Health Care Provider.  If your cough increases infrequency and severity and/or you have shortness of breath or increased shortness of breath call your Health Care Provider.  If you develop fever, chills, night sweats, malaise, and/or change in mental status call your Health care Provider.  Nutrition is very important.  Eat small frequent meals.  Increase your activity as tolerated.  Do not stay in bed all day  Follow up with pulmonologist Dr. Allison in 2-3 weeks.    Diagnosis: DM (diabetes mellitus), type 2  Assessment and Plan of Treatment:   Make sure you get your HgA1c checked every three months.  If you take oral diabetes medications, check your blood glucose two times a day.  If you take insulin, check your blood glucose before meals and at bedtime.  It's important not to skip any meals.  Keep a log of your blood glucose results and always take it with you to your doctor appointments.  Keep a list of your current medications including injectables and over the counter medications and bring this medication list with you to all your doctor appointments.  If you have not seen your ophthalmologist this year call for appointment.  Check your feet daily for redness, sores, or openings. Do not self treat. If no improvement in two days call your primary care physician for an appointment.  Low blood sugar (hypoglycemia) is a blood sugar below 70mg/dl. Check your blood sugar if you feel signs/symptoms of hypoglycemia. If your blood sugar is below 70 take 15 grams of carbohydrates (ex 4 oz of apple juice, 3-4 glucose tablets, or 4-6 oz of regular soda) wait 15 minutes and repeat blood sugar to make sure it comes up above 70.  If your blood sugar is above 70 and you are due for a meal, have a meal.  If you are not due for a meal have a snack.  This snack helps keeps your blood sugar at a safe range.      Diagnosis: Chronic atrial fibrillation  Assessment and Plan of Treatment: Atrial fibrillation is the most common heart rhythm problem.  The condition puts you at risk for has stroke and heart attack  It helps if you control your blood pressure, not drink more than 1-2 alcohol drinks per day, cut down on caffeine, getting treatment for over active thyroid gland, and get regular exercise  Call your doctor if you feel your heart racing or beating unusually, chest tightness or pain, lightheaded, faint, shortness of breath especially with exercise  It is important to take your heart medication as prescribed  You may be on anticoagulation which is very important to take as directed - you may need blood work to monitor drug levels      Diagnosis: Seizure disorder  Assessment and Plan of Treatment: Continue Keppra, and vimpat.      Diagnosis: Hypertension  Assessment and Plan of Treatment: Low salt diet  Activity as tolerated.  Take all medication as prescribed.  Follow up with your medical doctor for routine blood pressure monitoring at your next visit.  Notify your doctor if you have any of the following symptoms:   Dizziness, Lightheadedness, Blurry vision, Headache, Chest pain, Shortness of breath      Diagnosis: Severe mitral valve regurgitation  Assessment and Plan of Treatment: Please follow up with structural heart specialist Dr. Leahy in 2 weeks for for a repeat echocardiogram and discussion of possible treatment options for mitral valve regurgitation. Follow up with cardiology Dr. Waller in 2-3 weeks.    Diagnosis: Encounter for wound care  Assessment and Plan of Treatment:   Wound Care Summary:  Wounds/Findings:  Right elbow: contracted, hyperpigmented  Sacrum: hyperpigmented, with scar tissue  Left shin: open wound  Left heel: callus  Care Recommendations:  Right elbow: Apply Cavilon daily  Sacrum: Apply Cavilon daily, Neptali cream twice daily and as needed  Left shin: Apply Silvadene daily  Left heel: DPM to continue Aquaphor  General Measures:  Elevate both lower extremities  Offload sacrum to prevent further pressure injury  Continue antibiotics per Infectious Disease (ID) recommendations  Moisturize all intact skin with SWEEN cream twice daily  Nutrition consult: Increase high-quality protein, use Isidro/Prosource, multivitamin, and vitamin C for wound healing  Avoid hyperglycemia: blood sugars improving with ADA diet, Lantus, fingerstick checks, and sliding scale insulin as needed  Continue regular turning and positioning, use offloading/assistive devices  Use Attends underpads and provide pericare as needed  Use Waffle cushion in chair when out of bed  Continue use of low air loss pressure mattress  Discharge Planning:  If needed, follow up as outpatient at Wound Center: [address] [place] [address], Phone: [phone]

## 2025-06-20 NOTE — PROVIDER CONTACT NOTE (OTHER) - BACKGROUND
Pt admitted for sepsis, cough and SOB. PMH DM, Asthma, Colon cancer with colostomy.
Sepsis/SIRS 2/2 MF PNA- on vanco and invanz per ID; on room air;   MRSA Bacteremia: BCx 6/9 +MRSA;  PMHx colon cancer, COPD, DM, asthma.
Pt admitted for Sepsis/SIRS 2/2 MF PNA- on vanco ; on room air; MRSA Bacteremia: BCx 6/9 +MRSA; dsaily CHG bath. SAFIA neg for endocarditis
Admitted for cough, PMH colon cancer, asthma, DM, HTN, TIA's, seizure disorder, COPD, and A-fib.
Sepsis/SIRS 2/2 MF PNA- on vanco and invanz per ID; on room air;   	MRSA Bacteremia: BCx 6/9 +MRSA;
Sepsis/SIRS 2/2 MF PNA- on vanco and invanz per ID; on room air;   	MRSA Bacteremia: BCx 6/9 +MRSA; dsaily CHG bath.   	COPD/asthma- chest vest, CPAP HS

## 2025-06-20 NOTE — PROGRESS NOTE ADULT - ASSESSMENT
74F w/ asthma (chronic methypred 8mg PO daily), bronchiectasis, allergic rhinitis, recurrent PNA, SDB ( on CPAP), tracheomalacia s/p tracheoplasty, tracheobronchomalacia, pAfib (Eliquis), colorectal ca s/p colostomy, aortic dissection s/p ascending aorta repair and prothestic aortic valve with severe AS s/p AVR, presents with cough, SOB and hemoptysis, found to have MRSA PNA and high grade bacteremial.     Home Airway clearance regimen: Albuterol q6h -> Chest Vest -> Perforomist BID  -> Pulmicort BID, - > Ohtuvayre 2.5 BID - > HyperSal 7% q12h. Patient also on Breo Ellipta 200 at 1 inhalation QD, Incruse Ellipta 1 puff QD, and on Tezspire airway     #MRSA PNA  #MRSA bacteremia  #Bronchiectasis and asthma exacerbation   #Hemoptysis  #SDB - on CPAP  - c/w Airway clearance regimen as possible Albuterol q6h -> Chest Vest -> Perforomist BID (patient brought in) -> Pulmicort BID, - > Ohtuvayre 2.5 BID - > HyperSal 7% q12h.  - c/w Eliquis for now, monitor for hemoptysis- no longer having for last 3 days  - Patient also on Breo Ellipta 200 at 1 inhalation QD, Incruse Ellipta 1 puff QD. Can use Advair/Spiriva while admitted  - Tezspire outpatient   - Agree with Vanc. f/u ID recs; SAFIA does not show evidence of endocarditis noted extension of chronic aortic dissection, follow up cards recs   - c/w home CPAP and Chest Vest for use  - c/w home dose methylpred 8mg daily   -discussed with patient and daughter importance of airway clearance and advised vest use at least 4x/day on discharge  - s/p PICC placement; pending confirmation of home abx    No pulmonary contraindication to dc. Has appt with Dr. Allison on July 2nd.    74F w/ asthma (chronic methypred 8mg PO daily), bronchiectasis, allergic rhinitis, recurrent PNA, SDB ( on CPAP), tracheomalacia s/p tracheoplasty, tracheobronchomalacia, pAfib (Eliquis), colorectal ca s/p colostomy, aortic dissection s/p ascending aorta repair and prothestic aortic valve with severe AS s/p AVR, presents with cough, SOB and hemoptysis, found to have MRSA PNA and high grade bacteremia.     Home Airway clearance regimen: Albuterol q6h -> Chest Vest -> Perforomist BID  -> Pulmicort BID, - > Ohtuvayre 2.5 BID - > HyperSal 7% q12h. Patient also on Breo Ellipta 200 at 1 inhalation QD, Incruse Ellipta 1 puff QD, and on Tezspire airway     #MRSA PNA  #MRSA bacteremia  #Bronchiectasis and asthma exacerbation   #Hemoptysis  #SDB - on CPAP  - c/w Airway clearance regimen as possible Albuterol q6h -> Chest Vest -> Perforomist BID (patient brought in) -> Pulmicort BID, - > Ohtuvayre 2.5 BID - > HyperSal 7% q12h.  - c/w Eliquis for now, monitor for hemoptysis- no longer having for last  week  - Patient also on Breo Ellipta 200 at 1 inhalation QD, Incruse Ellipta 1 puff QD. Can use Advair/Spiriva while admitted  - Tezspire outpatient   - Agree with Vanc. f/u ID recs; SAFIA does not show evidence of endocarditis noted extension of chronic aortic dissection, follow up cards recs   - c/w home CPAP and Chest Vest for use  - c/w home dose methylpred 8mg daily   -discussed with patient and daughter importance of airway clearance and advised vest use at least 4x/day on discharge  - s/p PICC placement; pending confirmation of home abx    No pulmonary contraindication to dc. Has appt with Dr. Allison on July 2nd.

## 2025-06-20 NOTE — PROGRESS NOTE ADULT - SUBJECTIVE AND OBJECTIVE BOX
[facility] Division of Hospital Medicine  Kacie Meek MD  MS Teams PREFERRED        SUBJECTIVE / OVERNIGHT EVENTS: Seen at bedside this morning. She appears well. NAD.     MEDICATIONS  (STANDING):  albuterol    0.083% 2.5 milliGRAM(s) Nebulizer every 6 hours  apixaban 5 milliGRAM(s) Oral two times a day  artificial  tears Solution 1 Drop(s) Both EYES every 4 hours  budesonide    Inhalation Suspension 1 milliGRAM(s) Inhalation two times a day  chlorhexidine 2% Cloths 1 Application(s) Topical daily  chlorhexidine 4% Liquid 1 Application(s) Topical <User Schedule>  dextrose 5%. 1000 milliLiter(s) (50 mL/Hr) IV Continuous <Continuous>  dextrose 5%. 1000 milliLiter(s) (100 mL/Hr) IV Continuous <Continuous>  dextrose 50% Injectable 25 Gram(s) IV Push once  dextrose 50% Injectable 12.5 Gram(s) IV Push once  dextrose 50% Injectable 25 Gram(s) IV Push once  famotidine    Tablet 20 milliGRAM(s) Oral daily  fluticasone propionate/ salmeterol 250-50 MICROgram(s) Diskus 1 Dose(s) Inhalation two times a day  formoterol for nebulization 20 MICROGram(s) Nebulizer every 12 hours  glucagon  Injectable 1 milliGRAM(s) IntraMuscular once  insulin glargine Injectable (LANTUS) 20 Unit(s) SubCutaneous at bedtime  insulin lispro (ADMELOG) corrective regimen sliding scale   SubCutaneous Before meals and at bedtime  insulin lispro Injectable (ADMELOG) 2 Unit(s) SubCutaneous before breakfast  insulin lispro Injectable (ADMELOG) 4 Unit(s) SubCutaneous before lunch  insulin lispro Injectable (ADMELOG) 4 Unit(s) SubCutaneous before dinner  lacosamide 100 milliGRAM(s) Oral two times a day  levETIRAcetam 1000 milliGRAM(s) Oral two times a day  methylPREDNISolone 8 milliGRAM(s) Oral daily  mexiletine 200 milliGRAM(s) Oral three times a day  mineral oil 30 milliLiter(s) Oral daily  montelukast 10 milliGRAM(s) Oral at bedtime  NIFEdipine XL 60 milliGRAM(s) Oral daily  Ohtuvayre 3 mg/2.5 mL Inhalation 3 milliGRAM(s) 3 milliGRAM(s) Nebulizer every 12 hours  pantoprazole    Tablet 40 milliGRAM(s) Oral before breakfast  polyethylene glycol 3350 17 Gram(s) Oral daily  rosuvastatin 5 milliGRAM(s) Oral at bedtime  senna 2 Tablet(s) Oral at bedtime  sertraline 50 milliGRAM(s) Oral daily  silver sulfADIAZINE 1% Cream 1 Application(s) Topical daily  sodium chloride 0.9%. 1000 milliLiter(s) (75 mL/Hr) IV Continuous <Continuous>  sodium chloride 7% Inhalation 4 milliLiter(s) Inhalation every 12 hours  tiotropium 2.5 MICROgram(s) Inhaler 2 Puff(s) Inhalation daily  vancomycin  IVPB 750 milliGRAM(s) IV Intermittent every 12 hours    MEDICATIONS  (PRN):  aluminum hydroxide/magnesium hydroxide/simethicone Suspension 30 milliLiter(s) Oral every 6 hours PRN Dyspepsia  dextrose Oral Gel 15 Gram(s) Oral once PRN Blood Glucose LESS THAN 70 milliGRAM(s)/deciliter  lactulose Syrup 10 Gram(s) Oral daily PRN constipation  magnesium hydroxide Suspension 30 milliLiter(s) Oral daily PRN Constipation  melatonin 3 milliGRAM(s) Oral at bedtime PRN Insomnia  ondansetron Injectable 4 milliGRAM(s) IV Push every 8 hours PRN Nausea and/or Vomiting  sodium chloride 0.9% lock flush 10 milliLiter(s) IV Push every 1 hour PRN Pre/post blood products, medications, blood draw, and to maintain line patency      I&O's Summary    19 Jun 2025 07:01  -  20 Jun 2025 07:00  --------------------------------------------------------  IN: 0 mL / OUT: 800 mL / NET: -800 mL        PHYSICAL EXAM:  Vital Signs Last 24 Hrs  T(C): 36.7 (20 Jun 2025 10:59), Max: 36.7 (19 Jun 2025 16:48)  T(F): 98.1 (20 Jun 2025 10:59), Max: 98.1 (19 Jun 2025 16:48)  HR: 69 (20 Jun 2025 10:59) (61 - 69)  BP: 122/74 (20 Jun 2025 10:59) (96/61 - 122/74)  BP(mean): --  RR: 18 (20 Jun 2025 10:59) (17 - 18)  SpO2: 100% (20 Jun 2025 10:59) (96% - 100%)    Parameters below as of 20 Jun 2025 10:59  Patient On (Oxygen Delivery Method): room air      GENERAL: NAD, breathing not labored   EYES: conjunctiva and sclera clear  NECK: supple, No JVD  CHEST/LUNG: b/l rhonchi (improved)  HEART: S1 S2 RRR  ABDOMEN: +BS Soft, NT/ND  EXTREMITIES:  2+ DP Pulses, No c/c. no LE edema  NEUROLOGY: AAOx3, no facial droop, moving all extremities     LABS:                        12.1   8.03  )-----------( 232      ( 20 Jun 2025 06:25 )             39.6     06-20    141  |  105  |  12  ----------------------------<  188[H]  4.3   |  23  |  0.67    Ca    8.8      20 Jun 2025 06:26            Urinalysis Basic - ( 20 Jun 2025 06:26 )    Color: x / Appearance: x / SG: x / pH: x  Gluc: 188 mg/dL / Ketone: x  / Bili: x / Urobili: x   Blood: x / Protein: x / Nitrite: x   Leuk Esterase: x / RBC: x / WBC x   Sq Epi: x / Non Sq Epi: x / Bacteria: x

## 2025-06-20 NOTE — PROVIDER CONTACT NOTE (OTHER) - DATE AND TIME:
12-Jun-2025 05:29
15-Christopher-2025 06:05
13-Jun-2025 23:27
18-Jun-2025 14:30
10-Christopher-2025 17:07
20-Jun-2025 04:30

## 2025-06-20 NOTE — DISCHARGE NOTE NURSING/CASE MANAGEMENT/SOCIAL WORK - NSDCVIVACCINE_GEN_ALL_CORE_FT
COVID-19, mRNA, LNP-S, PF, 100 mcg/ 0.5 mL dose (Moderna); 06-Dec-2021 17:12; Afshan Lew (RADHA); [employer] [id number] (Exp. Date: 22-Dec-2021); IntraMuscular; Deltoid Left.; 0.25 milliLiter(s);

## 2025-06-20 NOTE — PROVIDER CONTACT NOTE (OTHER) - ASSESSMENT
pt is A&O x 4. VSS as per flowsheets. Pt denies SOB, difficulty breathing. No s/s of discomfort or respiratory distress noted at this time. Pt wore home CPAP machine for 1 hour. pt removed CPAP machine and is now refusing to wear it overnight.
Pt A&Ox4, able to make needs known. Pt asymptomatic. VSS. No s/s of bleeding. Pt denies cp, denies SOB, denies pain.
VSS. Site intact no swelling, redness, bleeding and burning.
A/O x4, VSS, refused am lab draws.
Pt A&Ox4, able to make needs known. Pt c/o nonradiating cp. VSS, see flowsheet. Pt states she thinks it could be "gas". No s/s of bleeding. Pt  denies SOB, denies pain. EKG completed
Pt is A&O x 4. VSS as per flowsheets. No s/s of pain or distress noted at this time. Pt gets IV vanco q 12 due at 1 pm and 1am. IV site appears WNL. No pain at IV site. IV flushes wiithout difficulty.iV expires today

## 2025-06-20 NOTE — DISCHARGE NOTE PROVIDER - NSDCFUADDAPPT_GEN_ALL_CORE_FT
APPTS ARE READY TO BE MADE: [X] YES    Best Family or Patient Contact (if needed):    Additional Information about above appointments (if needed):    1: Follow up with primary care physician in 1 week.  2: Follow up with infectious disease Dr. Ramirez in 2 weeks.  3: Follow up with pulmonology Dr. Allison in 2-3 weeks.  4: Follow up with cardiology Dr. Waller in 2-3 weeks.   5: Follow up with structural heart specialist Dr. Leahy in 2-3 weeks.     Other comments or requests:    APPTS ARE READY TO BE MADE: [X] YES    Best Family or Patient Contact (if needed):    Additional Information about above appointments (if needed):    1: Follow up with primary care physician in 1 week.  2: Follow up with infectious disease Dr. Ramirez in 2 weeks.  3: Follow up with pulmonology Dr. Allison in 2-3 weeks.  4: Follow up with cardiology Dr. Waller in 2-3 weeks.   5: Follow up with structural heart specialist Dr. Leahy in 2-3 weeks.     Other comments or requests:     CARDIOVAS/PULM - Setting as a callback and continue, so I have time to coordinate the patients appointments. I will follow up with the patient once the appointments have been secured.     HOME PULM - Appointment was scheduled in [facility] with Dr. Meredith 6/24 7:00pm via Telehealth    PCP - Provided patient with provider referral information, however patient prefers to schedule the appointments on their own.     INFDIS - Prior to outreaching the patient, it was visible that the patient has secured a follow up appointment which was not scheduled by our team. Patient is scheduled with Dr. Stout 7/14 1:30pm [facility]   APPTS ARE READY TO BE MADE: [X] YES    Best Family or Patient Contact (if needed):    Additional Information about above appointments (if needed):    1: Follow up with primary care physician in 1 week.  2: Follow up with infectious disease Dr. Ramirez in 2 weeks.  3: Follow up with pulmonology Dr. Allison in 2-3 weeks.  4: Follow up with cardiology Dr. Waller in 2-3 weeks.   5: Follow up with structural heart specialist Dr. Leahy in 2-3 weeks.     Other comments or requests:     CARDIO - Prior to outreaching the patient, it was visible that the patient has secured a follow up appointment which was not scheduled by our team. Patient is scheduled with Dr. Leahy 8/11 12:00pm [facility]     PULVANCE -Appointment was scheduled by our team on the patient's behalf through the provider's office. Patient is scheduled with ALLEY Rice 6/30 2:00pm [address] PULM - Appointment was scheduled in [facility] with Dr. Meredith 6/24 7:00pm via Telehealth    PCP - Provided patient with provider referral information, however patient prefers to schedule the appointments on their own.     INFDIS - Prior to outreaching the patient, it was visible that the patient has secured a follow up appointment which was not scheduled by our team. Patient is scheduled with Dr. Stout 7/14 1:30pm [address] [facility]

## 2025-06-20 NOTE — DISCHARGE NOTE PROVIDER - CARE PROVIDER_API CALL
Mally Ramirez  Infectious Disease  [address] [place] [address]  Phone: [phone]  Fax: [phone]  Follow Up Time: 2 weeks    Eulalio Allison  Pulmonary Disease  [address]  [place] [address]  Phone: [phone]  Fax: [phone]  Established Patient  Follow Up Time: 2 weeks    Litzy Waller  Cardiovascular Disease  [address] [place] [address]  Phone: [phone]  Fax: [phone]  Follow Up Time: 2 weeks    Steven Leahy  Cardiovascular Disease  [address] [place] [address]  Phone: [phone]  Fax: [phone]  Follow Up Time: 2 weeks

## 2025-06-20 NOTE — DISCHARGE NOTE PROVIDER - NSDCMRMEDTOKEN_GEN_ALL_CORE_FT
ascorbic acid 500 mg oral tablet: 1 tab(s) orally once a day (in the morning)  Breo Ellipta 200 mcg-25 mcg/inh inhalation powder: 1 puff(s) inhaled once a day  budesonide 1 mg/2 mL inhalation suspension: 2 milliliter(s) by nebulizer 2 times a day  Cranberry oral tablet: 1 tab(s) orally once a day (in the morning)  DuoNeb 0.5 mg-2.5 mg/3 mL inhalation solution: 3 milliliter(s) by nebulizer 4 times a day  Eliquis 5 mg oral tablet: 1 tab(s) orally 2 times a day  famotidine 20 mg oral tablet: 1 tab(s) orally once a day (at bedtime)  ferrous sulfate: 325 milligram(s) orally once a day  glycopyrrolate 1 mg oral tablet: 1 tab(s) orally 3 times a day  Incruse Ellipta 62.5 mcg/inh inhalation powder: 1 inhaler(s) inhaled once a day  Keppra 500 mg oral tablet: 2 tab(s) orally 2 times a day  lacosamide 100 mg oral tablet: 1 tab(s) orally 2 times a day  lactulose: 15 milliliter(s) orally once a day  megestrol 20 mg oral tablet: 1 tab(s) orally once a day as needed for appetite  methylPREDNISolone 8 mg oral tablet: 1 tab(s) orally once a day  mexiletine 200 mg oral capsule: 1 cap(s) orally 3 times a day  montelukast 10 mg oral tablet: 1 tab(s) orally once a day (at bedtime)  multivitamin: 1 tab(s) orally once a day  NIFEdipine 60 mg oral tablet, extended release: 1 tab(s) orally once a day  ocular lubricant preservative-free ophthalmic solution: 1 drop(s) to each affected eye every 4 hours  Ohtuvayre 3 mg/ 2.5mL inhalation suspension: 3 milligram(s) by nebulizer 2 times a day  pantoprazole 40 mg oral delayed release tablet: 1 tab(s) orally once a day  Perforomist 20 mcg/2 mL inhalation solution: 2 milliliter(s) inhaled 2 times a day  rosuvastatin 5 mg oral tablet: 1 tab(s) orally once a day (at bedtime)  Senna 8.6 mg oral tablet: 2 tab(s) orally once a day  sertraline 50 mg oral tablet: 1 tab(s) orally once a day (in the morning)  Tezspire Pre-filled Pen 210 mg/1.91 mL subcutaneous solution: 210 milligram(s) subcutaneously once a month  vancomycin 750 mg/150 mL-D5% intravenous solution: 150 milliliter(s) intravenous 2 times a day   ascorbic acid 500 mg oral tablet: 1 tab(s) orally once a day (in the morning)  Breo Ellipta 200 mcg-25 mcg/inh inhalation powder: 1 puff(s) inhaled once a day  budesonide 1 mg/2 mL inhalation suspension: 2 milliliter(s) by nebulizer 2 times a day  Cranberry oral tablet: 1 tab(s) orally once a day (in the morning)  DuoNeb 0.5 mg-2.5 mg/3 mL inhalation solution: 3 milliliter(s) by nebulizer 4 times a day  Eliquis 5 mg oral tablet: 1 tab(s) orally 2 times a day  famotidine 20 mg oral tablet: 1 tab(s) orally once a day (at bedtime)  ferrous sulfate: 325 milligram(s) orally once a day  glycopyrrolate 1 mg oral tablet: 1 tab(s) orally 3 times a day  Incruse Ellipta 62.5 mcg/inh inhalation powder: 1 inhaler(s) inhaled once a day  Keppra 500 mg oral tablet: 2 tab(s) orally 2 times a day  lacosamide 100 mg oral tablet: 1 tab(s) orally 2 times a day  lactulose: 15 milliliter(s) orally once a day  Lantus Solostar Pen 100 units/mL subcutaneous solution: 28 unit(s) subcutaneous once a day (at bedtime) 22 units AM and 28 units PM  megestrol 20 mg oral tablet: 1 tab(s) orally once a day as needed for appetite  methylPREDNISolone 8 mg oral tablet: 1 tab(s) orally once a day  mexiletine 200 mg oral capsule: 1 cap(s) orally 3 times a day  montelukast 10 mg oral tablet: 1 tab(s) orally once a day (at bedtime)  multivitamin: 1 tab(s) orally once a day  NIFEdipine 60 mg oral tablet, extended release: 1 tab(s) orally once a day  Novolog sliding scale: Follow correctional scale three times a day with meals  ocular lubricant preservative-free ophthalmic solution: 1 drop(s) to each affected eye every 4 hours  Ohtuvayre 3 mg/ 2.5mL inhalation suspension: 3 milligram(s) by nebulizer 2 times a day  pantoprazole 40 mg oral delayed release tablet: 1 tab(s) orally once a day  Perforomist 20 mcg/2 mL inhalation solution: 2 milliliter(s) inhaled 2 times a day  rosuvastatin 5 mg oral tablet: 1 tab(s) orally once a day (at bedtime)  Senna 8.6 mg oral tablet: 2 tab(s) orally once a day  sertraline 50 mg oral tablet: 1 tab(s) orally once a day (in the morning)  Tezspire Pre-filled Pen 210 mg/1.91 mL subcutaneous solution: 210 milligram(s) subcutaneously once a month  vancomycin 750 mg/150 mL-D5% intravenous solution: 150 milliliter(s) intravenous 2 times a day

## 2025-06-20 NOTE — DISCHARGE NOTE PROVIDER - NSFOLLOWUPCLINICS_GEN_ALL_ED_FT
[facility]  Internal Medicine  [address]  [place] [address]  Phone: [phone]  Fax:   Follow Up Time: 1 week     [facility]  Pulmonary  [place]   Phone:   Fax:   Follow Up Time: 1-3 days    [facility]  Internal Medicine  [address] [place] [address]  Phone: [phone]  Fax:   Follow Up Time: 2 weeks

## 2025-06-20 NOTE — PROGRESS NOTE ADULT - ASSESSMENT
74F complex PMHx with Epilepsy on Keppra, COPD, asthma (on CPAP and VATS at home, on chronic steroids), DM2, bronchiectasis, tracheomalacia s/p tracheloplasty, HTN, Hx of dissection of descending thoracic aorta, hx of aortic aneurysm, Type B dissection (7/2024), medically managed initially as she did not meet criteria for surgery at that time, evaluated by Dr. 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 Patient reporting worsening cough and SOB over last 2-3 days. Patient recently dx with MRSA on sputum culture, started treatment on Bactrim. Reports she recently started Bactrim ds 1 BID x 10 days (6/5/2025)    Afebrile here in the ED. WBC on admission elevated to 25K. CXR on admission showing Right patchy airspace opacity may reflect pneumonia. CT chest non contrast - Multifocal pneumonia. Found to have MRSA bacteremia. Has a TAVR in place (severe AS s/p TAVR (9/10/2023)),  Has Bilateral TKA (20 years ago), plate and screws across pubic symphysis.     MRI lumbar spine 6/11/2025 - no evidence of discitis-osteomyelitis, epidural or paraspinal abscess.  TTE on 06/11- TAVR, There is trace paravalvular regurgitation. Moderate MR. Compared to the transthoracic echocardiogram performed on 7/3/2024, there have been no significant interval changes.  CTAP 06/12- Patchy cortical hypoenhancement in the bilateral renal lower poles, possibly pyelonephritis. UA on admission was negative, No abscess.  SAFIA today with extension of aortic dissection, TAVR appeared okay, no abscess, severe MR, no vegetations seen    # High grade MRSA bacteremia (left leg open wound likely source vs PNA)  # Multifocal PNA  # Recent MRSA PNA  # Left leg open wound   # S/P bioAVR / Bental 2022 and TAVR in 2023   # S/P internal fixation of remote fractures of the superior pubic rami and pubic symphysis  # Bilateral TKA - 20 years ago   # Leucocytosis   # Immunocompromised host   # severe persistent asthma-steroid dependent  # bronchiectasis/COPD-   # H/o multiple infections   # Chronic cough   # H/O multiple antibiotic allergies   # Elevated inflammatory markers     MICRO:   06/09 Bcx- MRSA ( 3/4) ( S- GARRETT: 1- Vancomycin)   06/09 Sputum cx-  MRSA  06/09 Urine cx- Negative   06/09 Urine strep and urine legionella- Negative   06/11 Bcx- 1/4 MRSA  6/13 BC x2 NGTD    ABX;   Vancomycin 06/09-- current     Recommendations:   - TTE on 06/11- TAVR, There is trace paravalvular regurgitation. Moderate MR.   - SAFIA noted, consider cardiac surgical evaluation  - f/u all cultures  - Vancomycin 750 mg Q12H until 7/27/2025  - weekly cbc, cmp, vancomycin trough email results to [email or web address] (if patient goes home) with order to removal of PICC / midline upon completion of therapy ATTN:  Dr. Ramirez  - outpatient f/u Dr. Ramirez    I will be away, returning 6/302025.  My associates to cover.  Please call ID as needed [phone].

## 2025-06-20 NOTE — PROVIDER CONTACT NOTE (OTHER) - RECOMMENDATIONS
Notify NEEMA Bobo.
Notify NEEMA Bobo
Provider Shirley Montes notified.
Notify NEEMA Coto
Olive Tomlinson NP notified
Notify ACP MADDY JOEL

## 2025-06-20 NOTE — DISCHARGE NOTE NURSING/CASE MANAGEMENT/SOCIAL WORK - FINANCIAL ASSISTANCE
[facility] provides services at a reduced cost to those who are determined to be eligible through [facility]’s financial assistance program. Information regarding [facility]’s financial assistance program can be found by going to [email or web address] or by calling [phone].

## 2025-06-20 NOTE — PROGRESS NOTE ADULT - PROBLEM SELECTOR PLAN 2
Severe MR due to mitral valve prolaps    seen on SAFIA  Structural consulted, recommended outpatient follow up after completion of abx

## 2025-06-21 ENCOUNTER — TRANSCRIPTION ENCOUNTER (OUTPATIENT)
Age: 75
End: 2025-06-21

## 2025-06-21 VITALS
TEMPERATURE: 98 F | DIASTOLIC BLOOD PRESSURE: 61 MMHG | SYSTOLIC BLOOD PRESSURE: 107 MMHG | OXYGEN SATURATION: 97 % | RESPIRATION RATE: 18 BRPM | HEART RATE: 65 BPM

## 2025-06-21 LAB
GLUCOSE BLDC GLUCOMTR-MCNC: 249 MG/DL — HIGH (ref 70–99)
VANCOMYCIN TROUGH SERPL-MCNC: 8.3 UG/ML — LOW (ref 10–20)

## 2025-06-21 PROCEDURE — 36415 COLL VENOUS BLD VENIPUNCTURE: CPT

## 2025-06-21 PROCEDURE — 71250 CT THORAX DX C-: CPT

## 2025-06-21 PROCEDURE — 82962 GLUCOSE BLOOD TEST: CPT

## 2025-06-21 PROCEDURE — 87150 DNA/RNA AMPLIFIED PROBE: CPT

## 2025-06-21 PROCEDURE — 85018 HEMOGLOBIN: CPT

## 2025-06-21 PROCEDURE — 96374 THER/PROPH/DIAG INJ IV PUSH: CPT

## 2025-06-21 PROCEDURE — 81001 URINALYSIS AUTO W/SCOPE: CPT

## 2025-06-21 PROCEDURE — 0241U: CPT

## 2025-06-21 PROCEDURE — 86900 BLOOD TYPING SEROLOGIC ABO: CPT

## 2025-06-21 PROCEDURE — 84295 ASSAY OF SERUM SODIUM: CPT

## 2025-06-21 PROCEDURE — 86140 C-REACTIVE PROTEIN: CPT

## 2025-06-21 PROCEDURE — 87205 SMEAR GRAM STAIN: CPT

## 2025-06-21 PROCEDURE — 83880 ASSAY OF NATRIURETIC PEPTIDE: CPT

## 2025-06-21 PROCEDURE — 85027 COMPLETE CBC AUTOMATED: CPT

## 2025-06-21 PROCEDURE — 82947 ASSAY GLUCOSE BLOOD QUANT: CPT

## 2025-06-21 PROCEDURE — 85652 RBC SED RATE AUTOMATED: CPT

## 2025-06-21 PROCEDURE — 97116 GAIT TRAINING THERAPY: CPT

## 2025-06-21 PROCEDURE — 85730 THROMBOPLASTIN TIME PARTIAL: CPT

## 2025-06-21 PROCEDURE — 87086 URINE CULTURE/COLONY COUNT: CPT

## 2025-06-21 PROCEDURE — 87899 AGENT NOS ASSAY W/OPTIC: CPT

## 2025-06-21 PROCEDURE — 76377 3D RENDER W/INTRP POSTPROCES: CPT

## 2025-06-21 PROCEDURE — 97161 PT EVAL LOW COMPLEX 20 MIN: CPT

## 2025-06-21 PROCEDURE — 80048 BASIC METABOLIC PNL TOTAL CA: CPT

## 2025-06-21 PROCEDURE — 87641 MR-STAPH DNA AMP PROBE: CPT

## 2025-06-21 PROCEDURE — 93005 ELECTROCARDIOGRAM TRACING: CPT

## 2025-06-21 PROCEDURE — 87640 STAPH A DNA AMP PROBE: CPT

## 2025-06-21 PROCEDURE — 93320 DOPPLER ECHO COMPLETE: CPT

## 2025-06-21 PROCEDURE — 80053 COMPREHEN METABOLIC PANEL: CPT

## 2025-06-21 PROCEDURE — 93312 ECHO TRANSESOPHAGEAL: CPT

## 2025-06-21 PROCEDURE — 99239 HOSP IP/OBS DSCHRG MGMT >30: CPT

## 2025-06-21 PROCEDURE — 84484 ASSAY OF TROPONIN QUANT: CPT

## 2025-06-21 PROCEDURE — 94664 DEMO&/EVAL PT USE INHALER: CPT

## 2025-06-21 PROCEDURE — 97110 THERAPEUTIC EXERCISES: CPT

## 2025-06-21 PROCEDURE — 80202 ASSAY OF VANCOMYCIN: CPT

## 2025-06-21 PROCEDURE — 87637 SARSCOV2&INF A&B&RSV AMP PRB: CPT

## 2025-06-21 PROCEDURE — 87070 CULTURE OTHR SPECIMN AEROBIC: CPT

## 2025-06-21 PROCEDURE — 36569 INSJ PICC 5 YR+ W/O IMAGING: CPT

## 2025-06-21 PROCEDURE — 84132 ASSAY OF SERUM POTASSIUM: CPT

## 2025-06-21 PROCEDURE — 86901 BLOOD TYPING SEROLOGIC RH(D): CPT

## 2025-06-21 PROCEDURE — 84100 ASSAY OF PHOSPHORUS: CPT

## 2025-06-21 PROCEDURE — 86850 RBC ANTIBODY SCREEN: CPT

## 2025-06-21 PROCEDURE — 96375 TX/PRO/DX INJ NEW DRUG ADDON: CPT

## 2025-06-21 PROCEDURE — 87040 BLOOD CULTURE FOR BACTERIA: CPT

## 2025-06-21 PROCEDURE — 93325 DOPPLER ECHO COLOR FLOW MAPG: CPT

## 2025-06-21 PROCEDURE — 85610 PROTHROMBIN TIME: CPT

## 2025-06-21 PROCEDURE — 83605 ASSAY OF LACTIC ACID: CPT

## 2025-06-21 PROCEDURE — 85025 COMPLETE CBC W/AUTO DIFF WBC: CPT

## 2025-06-21 PROCEDURE — 85014 HEMATOCRIT: CPT

## 2025-06-21 PROCEDURE — 83735 ASSAY OF MAGNESIUM: CPT

## 2025-06-21 PROCEDURE — 82803 BLOOD GASES ANY COMBINATION: CPT

## 2025-06-21 PROCEDURE — A9585: CPT

## 2025-06-21 PROCEDURE — 82435 ASSAY OF BLOOD CHLORIDE: CPT

## 2025-06-21 PROCEDURE — 72158 MRI LUMBAR SPINE W/O & W/DYE: CPT

## 2025-06-21 PROCEDURE — 87186 SC STD MICRODIL/AGAR DIL: CPT

## 2025-06-21 PROCEDURE — 99285 EMERGENCY DEPT VISIT HI MDM: CPT

## 2025-06-21 PROCEDURE — 71045 X-RAY EXAM CHEST 1 VIEW: CPT

## 2025-06-21 PROCEDURE — 82330 ASSAY OF CALCIUM: CPT

## 2025-06-21 PROCEDURE — 74177 CT ABD & PELVIS W/CONTRAST: CPT

## 2025-06-21 PROCEDURE — 94640 AIRWAY INHALATION TREATMENT: CPT

## 2025-06-21 PROCEDURE — C1751: CPT

## 2025-06-21 PROCEDURE — 87077 CULTURE AEROBIC IDENTIFY: CPT

## 2025-06-21 PROCEDURE — 93306 TTE W/DOPPLER COMPLETE: CPT

## 2025-06-21 RX ORDER — INSULIN GLARGINE-YFGN 100 [IU]/ML
28 INJECTION, SOLUTION SUBCUTANEOUS
Qty: 2 | Refills: 0
Start: 2025-06-21 | End: 2025-07-20

## 2025-06-21 RX ORDER — VANCOMYCIN HCL IN 5 % DEXTROSE 1.5G/250ML
1000 PLASTIC BAG, INJECTION (ML) INTRAVENOUS EVERY 12 HOURS
Refills: 0 | Status: DISCONTINUED | OUTPATIENT
Start: 2025-06-21 | End: 2025-06-21

## 2025-06-21 RX ORDER — VANCOMYCIN HCL IN 5 % DEXTROSE 1.5G/250ML
150 PLASTIC BAG, INJECTION (ML) INTRAVENOUS
Qty: 70 | Refills: 0
Start: 2025-06-21 | End: 2025-07-25

## 2025-06-21 RX ORDER — VANCOMYCIN HCL IN 5 % DEXTROSE 1.5G/250ML
1000 PLASTIC BAG, INJECTION (ML) INTRAVENOUS EVERY 24 HOURS
Refills: 0 | Status: DISCONTINUED | OUTPATIENT
Start: 2025-06-21 | End: 2025-06-21

## 2025-06-21 RX ADMIN — INSULIN LISPRO 2 UNIT(S): 100 INJECTION, SOLUTION INTRAVENOUS; SUBCUTANEOUS at 08:17

## 2025-06-21 RX ADMIN — Medication 1 APPLICATION(S): at 06:33

## 2025-06-21 RX ADMIN — Medication 60 MILLIGRAM(S): at 05:15

## 2025-06-21 RX ADMIN — Medication 40 MILLIGRAM(S): at 05:15

## 2025-06-21 RX ADMIN — Medication 250 MILLIGRAM(S): at 08:18

## 2025-06-21 RX ADMIN — Medication 1 APPLICATION(S): at 10:19

## 2025-06-21 RX ADMIN — LACOSAMIDE 100 MILLIGRAM(S): 150 TABLET, FILM COATED ORAL at 05:16

## 2025-06-21 RX ADMIN — INSULIN LISPRO 2: 100 INJECTION, SOLUTION INTRAVENOUS; SUBCUTANEOUS at 08:17

## 2025-06-21 RX ADMIN — APIXABAN 5 MILLIGRAM(S): 2.5 TABLET, FILM COATED ORAL at 05:15

## 2025-06-21 RX ADMIN — LEVETIRACETAM 1000 MILLIGRAM(S): 10 INJECTION, SOLUTION INTRAVENOUS at 05:15

## 2025-06-21 RX ADMIN — METHYLPREDNISOLONE ACETATE 8 MILLIGRAM(S): 80 INJECTION, SUSPENSION INTRA-ARTICULAR; INTRALESIONAL; INTRAMUSCULAR; SOFT TISSUE at 05:14

## 2025-06-21 RX ADMIN — MEXILETINE HYDROCHLORIDE 200 MILLIGRAM(S): 250 CAPSULE ORAL at 05:15

## 2025-06-21 NOTE — PROGRESS NOTE ADULT - PROBLEM SELECTOR PLAN 1
--Met SIRS with leukocytosis and tachpnyea (RR 22) on initial presentation. Present on admission.   --Multifocal Pneumonia on Imaging.   --Bacteremia- MRSA 3/4 bottles, repeat 6/11 1/4+GPC in clusters, repeat bx q48 hours     TTE without endocarditis - SAFIA done no vegetations but severe MR seen, structural said outpt f/u, no plan for surgery while on IV abx.  monitor repeat blood cultures > NGTD f/u with final results   c/w vancomycin - monitor trough, dosing adjusted per ID  ID and pulmonology following  Patient with many antibiotic allergies  s/p PICC line for long term antibiotics

## 2025-06-21 NOTE — PROGRESS NOTE ADULT - SUBJECTIVE AND OBJECTIVE BOX
[facility] Division of Hospital Medicine  Jovana Amos MD  Available via MS Teams    SUBJECTIVE / OVERNIGHT EVENTS:  No acute events overnight. No new complaints this AM.     MEDICATIONS  (STANDING):  albuterol    0.083% 2.5 milliGRAM(s) Nebulizer every 6 hours  apixaban 5 milliGRAM(s) Oral two times a day  artificial  tears Solution 1 Drop(s) Both EYES every 4 hours  budesonide    Inhalation Suspension 1 milliGRAM(s) Inhalation two times a day  chlorhexidine 2% Cloths 1 Application(s) Topical daily  chlorhexidine 4% Liquid 1 Application(s) Topical <User Schedule>  dextrose 5%. 1000 milliLiter(s) (50 mL/Hr) IV Continuous <Continuous>  dextrose 5%. 1000 milliLiter(s) (100 mL/Hr) IV Continuous <Continuous>  dextrose 50% Injectable 25 Gram(s) IV Push once  dextrose 50% Injectable 12.5 Gram(s) IV Push once  dextrose 50% Injectable 25 Gram(s) IV Push once  famotidine    Tablet 20 milliGRAM(s) Oral daily  fluticasone propionate/ salmeterol 250-50 MICROgram(s) Diskus 1 Dose(s) Inhalation two times a day  formoterol for nebulization 20 MICROGram(s) Nebulizer every 12 hours  glucagon  Injectable 1 milliGRAM(s) IntraMuscular once  insulin glargine Injectable (LANTUS) 20 Unit(s) SubCutaneous at bedtime  insulin lispro (ADMELOG) corrective regimen sliding scale   SubCutaneous Before meals and at bedtime  insulin lispro Injectable (ADMELOG) 2 Unit(s) SubCutaneous before breakfast  insulin lispro Injectable (ADMELOG) 4 Unit(s) SubCutaneous before lunch  insulin lispro Injectable (ADMELOG) 4 Unit(s) SubCutaneous before dinner  lacosamide 100 milliGRAM(s) Oral two times a day  levETIRAcetam 1000 milliGRAM(s) Oral two times a day  methylPREDNISolone 8 milliGRAM(s) Oral daily  mexiletine 200 milliGRAM(s) Oral three times a day  mineral oil 30 milliLiter(s) Oral daily  montelukast 10 milliGRAM(s) Oral at bedtime  NIFEdipine XL 60 milliGRAM(s) Oral daily  Ohtuvayre 3 mg/2.5 mL Inhalation 3 milliGRAM(s) 3 milliGRAM(s) Nebulizer every 12 hours  pantoprazole    Tablet 40 milliGRAM(s) Oral before breakfast  polyethylene glycol 3350 17 Gram(s) Oral daily  rosuvastatin 5 milliGRAM(s) Oral at bedtime  senna 2 Tablet(s) Oral at bedtime  sertraline 50 milliGRAM(s) Oral daily  silver sulfADIAZINE 1% Cream 1 Application(s) Topical daily  sodium chloride 0.9%. 1000 milliLiter(s) (75 mL/Hr) IV Continuous <Continuous>  sodium chloride 7% Inhalation 4 milliLiter(s) Inhalation every 12 hours  tiotropium 2.5 MICROgram(s) Inhaler 2 Puff(s) Inhalation daily  vancomycin  IVPB 1000 milliGRAM(s) IV Intermittent every 12 hours    MEDICATIONS  (PRN):  aluminum hydroxide/magnesium hydroxide/simethicone Suspension 30 milliLiter(s) Oral every 6 hours PRN Dyspepsia  dextrose Oral Gel 15 Gram(s) Oral once PRN Blood Glucose LESS THAN 70 milliGRAM(s)/deciliter  lactulose Syrup 10 Gram(s) Oral daily PRN constipation  magnesium hydroxide Suspension 30 milliLiter(s) Oral daily PRN Constipation  melatonin 3 milliGRAM(s) Oral at bedtime PRN Insomnia  ondansetron Injectable 4 milliGRAM(s) IV Push every 8 hours PRN Nausea and/or Vomiting  sodium chloride 0.9% lock flush 10 milliLiter(s) IV Push every 1 hour PRN Pre/post blood products, medications, blood draw, and to maintain line patency      I&O's Summary    20 Jun 2025 07:01  -  21 Jun 2025 07:00  --------------------------------------------------------  IN: 300 mL / OUT: 1800 mL / NET: -1500 mL        PHYSICAL EXAM:  Vital Signs Last 24 Hrs  T(C): 36.7 (21 Jun 2025 05:01), Max: 36.8 (20 Jun 2025 17:02)  T(F): 98.1 (21 Jun 2025 05:01), Max: 98.3 (20 Jun 2025 20:05)  HR: 65 (21 Jun 2025 05:01) (60 - 79)  BP: 107/61 (21 Jun 2025 05:01) (100/63 - 111/67)  BP(mean): --  RR: 18 (21 Jun 2025 05:01) (18 - 18)  SpO2: 97% (21 Jun 2025 05:01) (97% - 98%)    Parameters below as of 21 Jun 2025 05:01  Patient On (Oxygen Delivery Method): room air      GENERAL: NAD, breathing not labored   EYES: conjunctiva and sclera clear  NECK: supple, No JVD  CHEST/LUNG: b/l rhonchi  HEART: S1 S2 RRR  ABDOMEN: +BS Soft, NT/ND  EXTREMITIES: no LE edema  NEUROLOGY: AAOx3, no FND    LABS:                        12.1   8.03  )-----------( 232      ( 20 Jun 2025 06:25 )             39.6     06-20    141  |  105  |  12  ----------------------------<  188[H]  4.3   |  23  |  0.67    Ca    8.8      20 Jun 2025 06:26            Urinalysis Basic - ( 20 Jun 2025 06:26 )    Color: x / Appearance: x / SG: x / pH: x  Gluc: 188 mg/dL / Ketone: x  / Bili: x / Urobili: x   Blood: x / Protein: x / Nitrite: x   Leuk Esterase: x / RBC: x / WBC x   Sq Epi: x / Non Sq Epi: x / Bacteria: x        SARS-CoV-2: NotDetec (18 Apr 2025 12:25)  SARS-CoV-2: NotDetec (08 Jan 2025 11:50)      RADIOLOGY & ADDITIONAL TESTS:  New Imaging Personally Reviewed Today:  New Electrocardiogram Personally Reviewed Today:  Other Results Reviewed Today:   Prior or Outpatient Records Reviewed Today with Summary:    COORDINATION OF CARE:  Consultant Communication and Details of Discussion (where applicable):

## 2025-06-21 NOTE — PROGRESS NOTE ADULT - PROBLEM SELECTOR PROBLEM 2
COPD with asthma
COPD with asthma
Severe mitral valve regurgitation
Severe mitral valve regurgitation
COPD with asthma
Severe mitral valve regurgitation
COPD with asthma
COPD with asthma
Severe mitral valve regurgitation

## 2025-06-21 NOTE — PROGRESS NOTE ADULT - PROBLEM SELECTOR PLAN 4
Continue home Keppra, lacosamide
A1c 8.5  Takes Lantus 22u in AM and 28u in PM.  c/w Lantus 32u QHS   added premeal admelog 2/4/4 units   Fs better controlled  Monitor FS
A1c 8.5  Takes Lantus 22u in AM and 28u in PM.  c/w Lantus 32u QHS   added premeal admelog 2/4/4 units   Fs better controlled  Monitor FS
Continue home Keppra, lacosamide
A1c 8.5  Takes Lantus 22u in AM and 28u in PM.  c/w Lantus 32u QHS   added premeal admelog 2/4/4 units   Fs better controlled  Monitor FS
A1c 8.5  Takes Lantus 22u in AM and 28u in PM.  c/w Lantus 32u QHS   added premeal admelog 2/4/4 units   Fs better controlled  Monitor FS

## 2025-06-21 NOTE — PROGRESS NOTE ADULT - PROBLEM SELECTOR PROBLEM 5
Chronic atrial fibrillation
Seizure disorder
Chronic atrial fibrillation
Seizure disorder
Chronic atrial fibrillation
Chronic atrial fibrillation
Seizure disorder
Seizure disorder
Chronic atrial fibrillation
Chronic atrial fibrillation

## 2025-06-21 NOTE — PROGRESS NOTE ADULT - PROBLEM SELECTOR PROBLEM 7
Constipation
Hypertension
Constipation
Hypertension
Constipation
Hypertension
Constipation
Constipation
Hypertension

## 2025-06-21 NOTE — PROGRESS NOTE ADULT - PROBLEM SELECTOR PROBLEM 4
DM (diabetes mellitus), type 2
Seizure disorder
DM (diabetes mellitus), type 2
DM (diabetes mellitus), type 2
Seizure disorder
DM (diabetes mellitus), type 2
Seizure disorder
Seizure disorder

## 2025-06-21 NOTE — PROGRESS NOTE ADULT - PROBLEM SELECTOR PROBLEM 6
Chronic atrial fibrillation
Hypertension
Chronic atrial fibrillation
Chronic atrial fibrillation
Hypertension
Hypertension
Chronic atrial fibrillation
Hypertension

## 2025-06-21 NOTE — PROGRESS NOTE ADULT - PROBLEM SELECTOR PLAN 8
bowel regimen   daughter gives her mineral oil, senna, and milk of magnesia daily   with lactulose and miralax prn if constipatedT      Dispo: pending set up of outpatient abx  d/w pt in detail
bowel regimen   daughter gives her mineral oil, senna, and milk of magnesia daily   with lactulose and miralax prn if constipatedT      Dispo: DC today
bowel regimen   daughter gives her mineral oil, senna, and milk of magnesia daily   with lactulose and miralax prn if constipatedT      Dispo: pending set up of outpatient abx  d/w pt in detail
bowel regimen   daughter gives her mineral oil, senna, and milk of magnesia daily   with lactulose and miralax prn if constipatedT

## 2025-06-21 NOTE — PROGRESS NOTE ADULT - PROBLEM SELECTOR PROBLEM 3
COPD with asthma
COPD with asthma
DM (diabetes mellitus), type 2
DM (diabetes mellitus), type 2
COPD with asthma
DM (diabetes mellitus), type 2
COPD with asthma
DM (diabetes mellitus), type 2

## 2025-06-21 NOTE — PROGRESS NOTE ADULT - PROVIDER SPECIALTY LIST ADULT
Cardiology
Hospitalist
Infectious Disease
Infectious Disease
Pulmonology
Cardiology
Infectious Disease
Pulmonology
Pulmonology
Cardiology
Hospitalist
Infectious Disease
Infectious Disease
Pulmonology
Cardiology
Infectious Disease
Internal Medicine
Pulmonology
Hospitalist
Internal Medicine
Hospitalist

## 2025-06-23 ENCOUNTER — NON-APPOINTMENT (OUTPATIENT)
Age: 75
End: 2025-06-23

## 2025-06-24 ENCOUNTER — NON-APPOINTMENT (OUTPATIENT)
Age: 75
End: 2025-06-24

## 2025-06-24 ENCOUNTER — APPOINTMENT (OUTPATIENT)
Dept: PULMONOLOGY | Facility: CLINIC | Age: 75
End: 2025-06-24
Payer: MEDICARE

## 2025-06-24 PROBLEM — J45.51 SEVERE PERSISTENT ASTHMA WITH EXACERBATION: Status: ACTIVE | Noted: 2025-06-24

## 2025-06-24 PROBLEM — J15.212: Status: ACTIVE | Noted: 2025-06-24

## 2025-06-24 PROBLEM — J47.1 EXACERBATION OF BRONCHIECTASIS DUE TO INFECTION: Status: ACTIVE | Noted: 2025-06-24

## 2025-06-24 PROCEDURE — 99496 TRANSJ CARE MGMT HIGH F2F 7D: CPT | Mod: 2W

## 2025-06-25 ENCOUNTER — APPOINTMENT (OUTPATIENT)
Dept: HOME HEALTH SERVICES | Facility: HOME HEALTH | Age: 75
End: 2025-06-25

## 2025-06-25 VITALS
OXYGEN SATURATION: 94 % | RESPIRATION RATE: 16 BRPM | HEART RATE: 67 BPM | TEMPERATURE: 97.2 F | SYSTOLIC BLOOD PRESSURE: 110 MMHG | DIASTOLIC BLOOD PRESSURE: 60 MMHG

## 2025-06-25 PROBLEM — Z09 SURGICAL FOLLOWUP: Status: RESOLVED | Noted: 2023-02-10 | Resolved: 2025-06-25

## 2025-06-25 PROBLEM — Z87.898 HISTORY OF SHORTNESS OF BREATH: Status: RESOLVED | Noted: 2018-01-16 | Resolved: 2025-06-25

## 2025-06-25 PROBLEM — S31.000D SACRAL WOUND, SUBSEQUENT ENCOUNTER: Status: RESOLVED | Noted: 2019-05-03 | Resolved: 2025-06-25

## 2025-06-25 PROBLEM — Z11.59 SCREENING FOR VIRAL DISEASE: Status: RESOLVED | Noted: 2020-06-02 | Resolved: 2025-06-25

## 2025-06-25 PROBLEM — R21 SKIN RASH: Status: RESOLVED | Noted: 2024-05-06 | Resolved: 2025-06-25

## 2025-06-25 PROBLEM — R09.3 ABNORMAL SPUTUM COLOR: Status: RESOLVED | Noted: 2021-09-14 | Resolved: 2025-06-25

## 2025-06-25 PROBLEM — K12.0 APHTHOUS ULCER OF MOUTH: Status: ACTIVE | Noted: 2025-06-25

## 2025-06-25 PROBLEM — S91.301A WOUND OF RIGHT FOOT: Status: RESOLVED | Noted: 2022-10-05 | Resolved: 2025-06-25

## 2025-06-25 PROBLEM — Z12.83 SCREENING EXAM FOR SKIN CANCER: Status: RESOLVED | Noted: 2024-09-10 | Resolved: 2025-06-25

## 2025-06-25 PROBLEM — L59.8 SOFT TISSUE RADIONECROSIS: Status: RESOLVED | Noted: 2019-07-02 | Resolved: 2025-06-25

## 2025-06-25 PROBLEM — Z13.29 SCREENING FOR OTHER AND UNSPECIFIED ENDOCRINE, NUTRITIONAL, METABOLIC AND IMMUNITY DISORDERS: Status: RESOLVED | Noted: 2019-12-24 | Resolved: 2025-06-25

## 2025-06-25 PROBLEM — L98.9 SKIN LESION OF CHEST WALL: Status: RESOLVED | Noted: 2022-08-04 | Resolved: 2025-06-25

## 2025-06-25 PROBLEM — T50.905D ADVERSE EFFECT OF DRUG, SUBSEQUENT ENCOUNTER: Status: RESOLVED | Noted: 2019-12-24 | Resolved: 2025-06-25

## 2025-06-25 PROBLEM — T66.XXXA: Status: RESOLVED | Noted: 2019-06-13 | Resolved: 2025-06-25

## 2025-06-25 PROBLEM — Z85.048: Status: RESOLVED | Noted: 2017-04-05 | Resolved: 2025-06-25

## 2025-06-25 PROBLEM — Z87.898 HISTORY OF WEIGHT GAIN: Status: RESOLVED | Noted: 2024-09-18 | Resolved: 2025-06-25

## 2025-06-25 PROBLEM — T66.XXXA ADVERSE EFFECT OF RADIATION, INITIAL ENCOUNTER: Status: RESOLVED | Noted: 2019-07-02 | Resolved: 2025-06-25

## 2025-06-25 PROBLEM — L98.9 SKIN EROSION: Status: RESOLVED | Noted: 2024-09-10 | Resolved: 2025-06-25

## 2025-06-25 PROBLEM — T66.XXXD: Status: RESOLVED | Noted: 2019-06-13 | Resolved: 2025-06-25

## 2025-06-25 PROBLEM — Z87.898 HISTORY OF NAUSEA: Status: RESOLVED | Noted: 2018-10-22 | Resolved: 2025-06-25

## 2025-06-25 PROBLEM — Z86.19 HISTORY OF HERPES ZOSTER: Status: RESOLVED | Noted: 2020-05-05 | Resolved: 2025-06-25

## 2025-06-25 PROBLEM — L97.512 CHRONIC ULCER OF RIGHT FOOT WITH FAT LAYER EXPOSED: Status: RESOLVED | Noted: 2022-06-30 | Resolved: 2025-06-25

## 2025-06-25 PROBLEM — L59.8 RADIATION NECROSIS OF SKIN AND SUBCUTANEOUS: Status: RESOLVED | Noted: 2019-06-13 | Resolved: 2025-06-25

## 2025-06-25 PROBLEM — Z87.898 HISTORY OF SHORTNESS OF BREATH: Status: RESOLVED | Noted: 2018-05-03 | Resolved: 2025-06-25

## 2025-06-25 PROBLEM — Z87.2 HISTORY OF SEBORRHEIC DERMATITIS: Status: RESOLVED | Noted: 2020-11-30 | Resolved: 2025-06-25

## 2025-06-25 PROBLEM — Z86.14 HISTORY OF METHICILLIN RESISTANT STAPHYLOCOCCUS AUREUS INFECTION: Status: RESOLVED | Noted: 2020-01-02 | Resolved: 2025-06-25

## 2025-06-25 PROCEDURE — 99496 TRANSJ CARE MGMT HIGH F2F 7D: CPT

## 2025-06-25 RX ORDER — FORMOTEROL FUMARATE DIHYDRATE 20 UG/2ML
20 SOLUTION RESPIRATORY (INHALATION)
Refills: 0 | Status: ACTIVE | COMMUNITY
Start: 2025-06-24

## 2025-06-25 RX ORDER — CHLORHEXIDINE GLUCONATE 4 %
325 (65 FE) LIQUID (ML) TOPICAL DAILY
Qty: 30 | Refills: 0 | Status: ACTIVE | COMMUNITY
Start: 2025-06-24

## 2025-06-25 RX ORDER — ELECTROLYTES/DEXTROSE
SOLUTION, ORAL ORAL
Refills: 0 | Status: ACTIVE | COMMUNITY
Start: 2025-06-24

## 2025-06-25 RX ORDER — IPRATROPIUM BROMIDE AND ALBUTEROL SULFATE 2.5; .5 MG/3ML; MG/3ML
0.5-2.5 (3) SOLUTION RESPIRATORY (INHALATION)
Refills: 3 | Status: ACTIVE | COMMUNITY
Start: 2025-06-24

## 2025-06-25 RX ORDER — GLYCOPYRROLATE 1 MG/1
1 TABLET ORAL 3 TIMES DAILY
Refills: 0 | Status: ACTIVE | COMMUNITY
Start: 2025-06-24

## 2025-06-25 RX ORDER — LIDOCAINE HYDROCHLORIDE 20 MG/ML
2 SOLUTION ORAL; TOPICAL
Qty: 50 | Refills: 0 | Status: ACTIVE | COMMUNITY
Start: 2025-06-25 | End: 1900-01-01

## 2025-06-25 RX ORDER — MEGESTROL ACETATE 20 MG/1
20 TABLET ORAL DAILY
Refills: 0 | Status: ACTIVE | COMMUNITY
Start: 2025-06-25

## 2025-06-25 RX ORDER — MULTIVIT-MIN/FOLIC/VIT K/LYCOP 400-300MCG
500 TABLET ORAL
Qty: 90 | Refills: 3 | Status: ACTIVE | COMMUNITY
Start: 2025-06-24

## 2025-06-25 RX ORDER — VANCOMYCIN HYDROCHLORIDE 750 MG/15ML
750 INJECTION, POWDER, LYOPHILIZED, FOR SOLUTION INTRAVENOUS
Refills: 0 | Status: ACTIVE | COMMUNITY
Start: 2025-06-24

## 2025-06-27 PROBLEM — Z88.0 HISTORY OF ALLERGY TO PENICILLIN: Status: RESOLVED | Noted: 2017-04-03 | Resolved: 2025-06-27

## 2025-06-30 ENCOUNTER — APPOINTMENT (OUTPATIENT)
Dept: PULMONOLOGY | Facility: CLINIC | Age: 75
End: 2025-06-30
Payer: MEDICARE

## 2025-06-30 ENCOUNTER — APPOINTMENT (OUTPATIENT)
Dept: PULMONOLOGY | Facility: CLINIC | Age: 75
End: 2025-06-30

## 2025-06-30 ENCOUNTER — TRANSCRIPTION ENCOUNTER (OUTPATIENT)
Age: 75
End: 2025-06-30

## 2025-06-30 VITALS
OXYGEN SATURATION: 95 % | RESPIRATION RATE: 16 BRPM | WEIGHT: 142 LBS | SYSTOLIC BLOOD PRESSURE: 143 MMHG | TEMPERATURE: 97 F | HEART RATE: 109 BPM | DIASTOLIC BLOOD PRESSURE: 83 MMHG | BODY MASS INDEX: 22.29 KG/M2 | HEIGHT: 67 IN

## 2025-06-30 PROCEDURE — 99214 OFFICE O/P EST MOD 30 MIN: CPT | Mod: 25

## 2025-06-30 PROCEDURE — 96372 THER/PROPH/DIAG INJ SC/IM: CPT

## 2025-06-30 PROCEDURE — ZZZZZ: CPT

## 2025-06-30 RX ORDER — TEZEPELUMAB-EKKO 210 MG/1.9ML
210 INJECTION, SOLUTION SUBCUTANEOUS
Qty: 0 | Refills: 0 | Status: COMPLETED | OUTPATIENT
Start: 2025-06-27

## 2025-07-01 PROBLEM — Z99.89 CPAP (CONTINUOUS POSITIVE AIRWAY PRESSURE) DEPENDENCE: Status: ACTIVE | Noted: 2025-06-25

## 2025-07-07 ENCOUNTER — TRANSCRIPTION ENCOUNTER (OUTPATIENT)
Age: 75
End: 2025-07-07

## 2025-07-07 RX ORDER — FORMOTEROL FUMARATE DIHYDRATE 20 UG/2ML
20 SOLUTION RESPIRATORY (INHALATION)
Qty: 60 | Refills: 2 | Status: ACTIVE | COMMUNITY
Start: 2025-07-07 | End: 1900-01-01

## 2025-07-10 ENCOUNTER — TRANSCRIPTION ENCOUNTER (OUTPATIENT)
Age: 75
End: 2025-07-10

## 2025-07-10 ENCOUNTER — NON-APPOINTMENT (OUTPATIENT)
Age: 75
End: 2025-07-10

## 2025-07-10 PROBLEM — R60.0 EDEMA, LEG: Status: ACTIVE | Noted: 2025-07-10

## 2025-07-14 ENCOUNTER — APPOINTMENT (OUTPATIENT)
Dept: INFECTIOUS DISEASE | Facility: CLINIC | Age: 75
End: 2025-07-14
Payer: MEDICARE

## 2025-07-14 VITALS
HEART RATE: 75 BPM | HEIGHT: 67 IN | WEIGHT: 142 LBS | TEMPERATURE: 98.4 F | BODY MASS INDEX: 22.29 KG/M2 | SYSTOLIC BLOOD PRESSURE: 120 MMHG | DIASTOLIC BLOOD PRESSURE: 73 MMHG | OXYGEN SATURATION: 95 %

## 2025-07-14 PROCEDURE — G2211 COMPLEX E/M VISIT ADD ON: CPT

## 2025-07-14 PROCEDURE — 99213 OFFICE O/P EST LOW 20 MIN: CPT

## 2025-07-15 ENCOUNTER — NON-APPOINTMENT (OUTPATIENT)
Age: 75
End: 2025-07-15

## 2025-07-15 NOTE — DISCHARGE NOTE NURSING/CASE MANAGEMENT/SOCIAL WORK - NSPROMEDSBROUGHTTOHOSP_GEN_A_NUR
--DO NOT REPLY - Sent from PACT - If sent to wrong pool, reroute to P [facility] Reroute pool --    Message Type:  Refill Medication   Is the medication pended:Yes  Medication name: clonazePAM (KlonoPIN) 0.5 MG tablet   Message: refill  Preferred pharmacy verified, and selected.  [id number] - [place], [place] - [address]  Call Back #: [phone]  Can a detailed message be left?  Yes - Voicemail   Is the patient OUT of Medication?  Yes: Working Hours: route as HIGH priority according to KB. Patient has been advised the message will be reviewed within 1 business day   no

## 2025-07-16 PROBLEM — R44.3 HALLUCINATIONS: Status: ACTIVE | Noted: 2025-07-16

## 2025-07-16 RX ORDER — QUETIAPINE FUMARATE 25 MG/1
25 TABLET ORAL
Qty: 90 | Refills: 3 | Status: ACTIVE | COMMUNITY
Start: 2025-04-24 | End: 1900-01-01

## 2025-07-21 ENCOUNTER — RX RENEWAL (OUTPATIENT)
Age: 75
End: 2025-07-21

## 2025-07-25 ENCOUNTER — NON-APPOINTMENT (OUTPATIENT)
Age: 75
End: 2025-07-25

## 2025-07-28 ENCOUNTER — NON-APPOINTMENT (OUTPATIENT)
Age: 75
End: 2025-07-28

## 2025-07-28 ENCOUNTER — APPOINTMENT (OUTPATIENT)
Dept: PULMONOLOGY | Facility: CLINIC | Age: 75
End: 2025-07-28
Payer: MEDICARE

## 2025-07-28 VITALS
HEIGHT: 67 IN | TEMPERATURE: 97.5 F | DIASTOLIC BLOOD PRESSURE: 62 MMHG | RESPIRATION RATE: 18 BRPM | OXYGEN SATURATION: 97 % | HEART RATE: 73 BPM | SYSTOLIC BLOOD PRESSURE: 124 MMHG

## 2025-07-28 PROCEDURE — 96372 THER/PROPH/DIAG INJ SC/IM: CPT

## 2025-07-28 RX ORDER — TEZEPELUMAB-EKKO 210 MG/1.9ML
210 INJECTION, SOLUTION SUBCUTANEOUS
Qty: 0 | Refills: 0 | Status: COMPLETED | OUTPATIENT
Start: 2025-07-28

## 2025-08-01 ENCOUNTER — APPOINTMENT (OUTPATIENT)
Dept: INTERNAL MEDICINE | Facility: CLINIC | Age: 75
End: 2025-08-01

## 2025-08-05 ENCOUNTER — TRANSCRIPTION ENCOUNTER (OUTPATIENT)
Age: 75
End: 2025-08-05

## 2025-08-05 ENCOUNTER — NON-APPOINTMENT (OUTPATIENT)
Age: 75
End: 2025-08-05

## 2025-08-05 DIAGNOSIS — M79.671 PAIN IN RIGHT FOOT: ICD-10-CM

## 2025-08-06 ENCOUNTER — APPOINTMENT (OUTPATIENT)
Dept: HOME HEALTH SERVICES | Facility: HOME HEALTH | Age: 75
End: 2025-08-06
Payer: MEDICARE

## 2025-08-06 ENCOUNTER — APPOINTMENT (OUTPATIENT)
Dept: HOME HEALTH SERVICES | Facility: HOME HEALTH | Age: 75
End: 2025-08-06

## 2025-08-06 VITALS
TEMPERATURE: 97.5 F | OXYGEN SATURATION: 98 % | DIASTOLIC BLOOD PRESSURE: 78 MMHG | RESPIRATION RATE: 18 BRPM | HEART RATE: 70 BPM | SYSTOLIC BLOOD PRESSURE: 118 MMHG

## 2025-08-06 DIAGNOSIS — L02.611 CUTANEOUS ABSCESS OF RIGHT FOOT: ICD-10-CM

## 2025-08-06 PROCEDURE — 99347 HOME/RES VST EST SF MDM 20: CPT | Mod: 2W

## 2025-08-06 RX ORDER — DOXYCYCLINE 100 MG/1
100 TABLET, FILM COATED ORAL
Qty: 20 | Refills: 0 | Status: ACTIVE | COMMUNITY
Start: 2025-08-06 | End: 1900-01-01

## 2025-08-11 ENCOUNTER — TRANSCRIPTION ENCOUNTER (OUTPATIENT)
Age: 75
End: 2025-08-11

## 2025-08-11 ENCOUNTER — APPOINTMENT (OUTPATIENT)
Dept: INFECTIOUS DISEASE | Facility: CLINIC | Age: 75
End: 2025-08-11
Payer: MEDICARE

## 2025-08-11 VITALS
HEART RATE: 76 BPM | TEMPERATURE: 98.1 F | OXYGEN SATURATION: 97 % | HEIGHT: 67 IN | WEIGHT: 142 LBS | BODY MASS INDEX: 22.29 KG/M2 | DIASTOLIC BLOOD PRESSURE: 72 MMHG | SYSTOLIC BLOOD PRESSURE: 121 MMHG

## 2025-08-11 DIAGNOSIS — B95.62 BACTEREMIA: ICD-10-CM

## 2025-08-11 DIAGNOSIS — R78.81 BACTEREMIA: ICD-10-CM

## 2025-08-11 DIAGNOSIS — R09.3 ABNORMAL SPUTUM: ICD-10-CM

## 2025-08-11 PROCEDURE — 99214 OFFICE O/P EST MOD 30 MIN: CPT

## 2025-08-12 PROBLEM — L02.611 ABSCESS OF RIGHT FOOT: Status: ACTIVE | Noted: 2025-08-12

## 2025-08-13 LAB
BASOPHILS # BLD AUTO: 0.03 K/UL
BASOPHILS NFR BLD AUTO: 0.3 %
EOSINOPHIL # BLD AUTO: 0.02 K/UL
EOSINOPHIL NFR BLD AUTO: 0.2 %
HCT VFR BLD CALC: 44.2 %
HGB BLD-MCNC: 14 G/DL
IMM GRANULOCYTES NFR BLD AUTO: 0.8 %
LYMPHOCYTES # BLD AUTO: 1.9 K/UL
LYMPHOCYTES NFR BLD AUTO: 19.9 %
MAN DIFF?: NORMAL
MCHC RBC-ENTMCNC: 24.3 PG
MCHC RBC-ENTMCNC: 31.7 G/DL
MCV RBC AUTO: 76.7 FL
MONOCYTES # BLD AUTO: 0.53 K/UL
MONOCYTES NFR BLD AUTO: 5.6 %
NEUTROPHILS # BLD AUTO: 6.98 K/UL
NEUTROPHILS NFR BLD AUTO: 73.2 %
PLATELET # BLD AUTO: 218 K/UL
RBC # BLD: 5.76 M/UL
RBC # FLD: 18.2 %
WBC # FLD AUTO: 9.54 K/UL

## 2025-08-18 ENCOUNTER — NON-APPOINTMENT (OUTPATIENT)
Age: 75
End: 2025-08-18

## 2025-08-18 ENCOUNTER — TRANSCRIPTION ENCOUNTER (OUTPATIENT)
Age: 75
End: 2025-08-18

## 2025-08-18 DIAGNOSIS — H44.009 UNSPECIFIED PURULENT ENDOPHTHALMITIS, UNSPECIFIED EYE: ICD-10-CM

## 2025-08-18 RX ORDER — OFLOXACIN 3 MG/ML
0.3 SOLUTION/ DROPS OPHTHALMIC TWICE DAILY
Qty: 1 | Refills: 0 | Status: ACTIVE | COMMUNITY
Start: 2025-08-18 | End: 1900-01-01

## 2025-08-19 ENCOUNTER — NON-APPOINTMENT (OUTPATIENT)
Age: 75
End: 2025-08-19

## 2025-08-19 LAB
BACTERIA BLD CULT: NORMAL
BACTERIA BLD CULT: NORMAL
BACTERIA SPT CULT: NORMAL

## 2025-08-20 ENCOUNTER — APPOINTMENT (OUTPATIENT)
Dept: CARDIOLOGY | Facility: CLINIC | Age: 75
End: 2025-08-20

## 2025-08-20 PROCEDURE — 99215 OFFICE O/P EST HI 40 MIN: CPT | Mod: 2W

## 2025-08-25 ENCOUNTER — APPOINTMENT (OUTPATIENT)
Dept: PULMONOLOGY | Facility: CLINIC | Age: 75
End: 2025-08-25
Payer: MEDICARE

## 2025-08-25 VITALS
TEMPERATURE: 97.3 F | DIASTOLIC BLOOD PRESSURE: 66 MMHG | RESPIRATION RATE: 18 BRPM | OXYGEN SATURATION: 97 % | HEART RATE: 70 BPM | SYSTOLIC BLOOD PRESSURE: 128 MMHG | HEIGHT: 67 IN

## 2025-08-25 PROCEDURE — 96372 THER/PROPH/DIAG INJ SC/IM: CPT

## 2025-08-25 RX ORDER — TEZEPELUMAB-EKKO 210 MG/1.9ML
210 INJECTION, SOLUTION SUBCUTANEOUS
Qty: 0 | Refills: 0 | Status: COMPLETED | OUTPATIENT
Start: 2025-08-25

## 2025-08-26 ENCOUNTER — APPOINTMENT (OUTPATIENT)
Dept: PULMONOLOGY | Facility: CLINIC | Age: 75
End: 2025-08-26

## 2025-08-26 ENCOUNTER — NON-APPOINTMENT (OUTPATIENT)
Age: 75
End: 2025-08-26

## 2025-08-26 VITALS
RESPIRATION RATE: 16 BRPM | HEART RATE: 75 BPM | DIASTOLIC BLOOD PRESSURE: 68 MMHG | SYSTOLIC BLOOD PRESSURE: 124 MMHG | TEMPERATURE: 97.2 F | WEIGHT: 142 LBS | HEIGHT: 67 IN | OXYGEN SATURATION: 96 % | BODY MASS INDEX: 22.29 KG/M2

## 2025-08-26 DIAGNOSIS — J45.909 UNSPECIFIED ASTHMA, UNCOMPLICATED: ICD-10-CM

## 2025-08-26 DIAGNOSIS — J39.8 OTHER SPECIFIED DISEASES OF UPPER RESPIRATORY TRACT: ICD-10-CM

## 2025-08-26 DIAGNOSIS — R93.89 ABNORMAL FINDINGS ON DIAGNOSTIC IMAGING OF OTHER SPECIFIED BODY STRUCTURES: ICD-10-CM

## 2025-08-26 DIAGNOSIS — K21.9 GASTRO-ESOPHAGEAL REFLUX DISEASE W/OUT ESOPHAGITIS: ICD-10-CM

## 2025-08-26 DIAGNOSIS — G47.33 OBSTRUCTIVE SLEEP APNEA (ADULT) (PEDIATRIC): ICD-10-CM

## 2025-08-26 DIAGNOSIS — J45.50 SEVERE PERSISTENT ASTHMA, UNCOMPLICATED: ICD-10-CM

## 2025-08-26 DIAGNOSIS — J44.9 CHRONIC OBSTRUCTIVE PULMONARY DISEASE, UNSPECIFIED: ICD-10-CM

## 2025-08-26 DIAGNOSIS — J30.9 ALLERGIC RHINITIS, UNSPECIFIED: ICD-10-CM

## 2025-08-26 PROCEDURE — 99214 OFFICE O/P EST MOD 30 MIN: CPT | Mod: 25

## 2025-08-26 PROCEDURE — 94727 GAS DIL/WSHOT DETER LNG VOL: CPT

## 2025-08-26 PROCEDURE — 94010 BREATHING CAPACITY TEST: CPT

## 2025-08-26 PROCEDURE — 95012 NITRIC OXIDE EXP GAS DETER: CPT

## 2025-08-26 PROCEDURE — 94729 DIFFUSING CAPACITY: CPT

## 2025-09-02 ENCOUNTER — APPOINTMENT (OUTPATIENT)
Dept: INFECTIOUS DISEASE | Facility: CLINIC | Age: 75
End: 2025-09-02
Payer: MEDICARE

## 2025-09-02 ENCOUNTER — OUTPATIENT (OUTPATIENT)
Dept: OUTPATIENT SERVICES | Facility: HOSPITAL | Age: 75
LOS: 1 days | End: 2025-09-02
Payer: MEDICARE

## 2025-09-02 VITALS
TEMPERATURE: 98.2 F | SYSTOLIC BLOOD PRESSURE: 107 MMHG | WEIGHT: 146 LBS | HEIGHT: 67 IN | HEART RATE: 71 BPM | OXYGEN SATURATION: 97 % | DIASTOLIC BLOOD PRESSURE: 71 MMHG | BODY MASS INDEX: 22.91 KG/M2

## 2025-09-02 DIAGNOSIS — H05.352 EXOSTOSIS OF LEFT ORBIT: Chronic | ICD-10-CM

## 2025-09-02 DIAGNOSIS — Z87.09 PERSONAL HISTORY OF OTHER DISEASES OF THE RESPIRATORY SYSTEM: Chronic | ICD-10-CM

## 2025-09-02 DIAGNOSIS — Z96.653 PRESENCE OF ARTIFICIAL KNEE JOINT, BILATERAL: Chronic | ICD-10-CM

## 2025-09-02 DIAGNOSIS — Z95.2 PRESENCE OF PROSTHETIC HEART VALVE: Chronic | ICD-10-CM

## 2025-09-02 DIAGNOSIS — Z98.89 OTHER SPECIFIED POSTPROCEDURAL STATES: Chronic | ICD-10-CM

## 2025-09-02 DIAGNOSIS — I35.0 NONRHEUMATIC AORTIC (VALVE) STENOSIS: ICD-10-CM

## 2025-09-02 DIAGNOSIS — Z93.3 COLOSTOMY STATUS: Chronic | ICD-10-CM

## 2025-09-02 DIAGNOSIS — K62.5 HEMORRHAGE OF ANUS AND RECTUM: Chronic | ICD-10-CM

## 2025-09-02 DIAGNOSIS — Z98.890 OTHER SPECIFIED POSTPROCEDURAL STATES: Chronic | ICD-10-CM

## 2025-09-02 PROCEDURE — 99213 OFFICE O/P EST LOW 20 MIN: CPT

## 2025-09-02 PROCEDURE — G2211 COMPLEX E/M VISIT ADD ON: CPT

## 2025-09-03 ENCOUNTER — RESULT REVIEW (OUTPATIENT)
Age: 75
End: 2025-09-03

## 2025-09-03 ENCOUNTER — APPOINTMENT (OUTPATIENT)
Dept: CARDIOTHORACIC SURGERY | Facility: CLINIC | Age: 75
End: 2025-09-03
Payer: MEDICARE

## 2025-09-03 ENCOUNTER — APPOINTMENT (OUTPATIENT)
Dept: CARDIOTHORACIC SURGERY | Facility: CLINIC | Age: 75
End: 2025-09-03

## 2025-09-03 VITALS
HEART RATE: 68 BPM | OXYGEN SATURATION: 95 % | SYSTOLIC BLOOD PRESSURE: 127 MMHG | BODY MASS INDEX: 22.29 KG/M2 | HEIGHT: 67 IN | DIASTOLIC BLOOD PRESSURE: 70 MMHG | WEIGHT: 142 LBS | RESPIRATION RATE: 22 BRPM

## 2025-09-03 DIAGNOSIS — R06.00 DYSPNEA, UNSPECIFIED: ICD-10-CM

## 2025-09-03 DIAGNOSIS — I34.0 NONRHEUMATIC MITRAL (VALVE) INSUFFICIENCY: ICD-10-CM

## 2025-09-03 DIAGNOSIS — I48.20 CHRONIC ATRIAL FIBRILLATION, UNSP: ICD-10-CM

## 2025-09-03 DIAGNOSIS — I10 ESSENTIAL (PRIMARY) HYPERTENSION: ICD-10-CM

## 2025-09-03 PROCEDURE — 93356 MYOCRD STRAIN IMG SPCKL TRCK: CPT

## 2025-09-03 PROCEDURE — 99215 OFFICE O/P EST HI 40 MIN: CPT

## 2025-09-03 PROCEDURE — 93306 TTE W/DOPPLER COMPLETE: CPT

## 2025-09-03 PROCEDURE — 93306 TTE W/DOPPLER COMPLETE: CPT | Mod: 26

## 2025-09-03 RX ORDER — FUROSEMIDE 20 MG/1
20 TABLET ORAL
Qty: 60 | Refills: 3 | Status: ACTIVE | COMMUNITY
Start: 2025-09-03 | End: 1900-01-01

## 2025-09-04 ENCOUNTER — NON-APPOINTMENT (OUTPATIENT)
Age: 75
End: 2025-09-04

## 2025-09-04 ENCOUNTER — APPOINTMENT (OUTPATIENT)
Dept: ENDOCRINOLOGY | Facility: CLINIC | Age: 75
End: 2025-09-04
Payer: MEDICARE

## 2025-09-04 VITALS
HEART RATE: 56 BPM | OXYGEN SATURATION: 96 % | SYSTOLIC BLOOD PRESSURE: 125 MMHG | DIASTOLIC BLOOD PRESSURE: 80 MMHG | TEMPERATURE: 98 F | HEIGHT: 67 IN

## 2025-09-04 DIAGNOSIS — E55.9 VITAMIN D DEFICIENCY, UNSPECIFIED: ICD-10-CM

## 2025-09-04 DIAGNOSIS — E11.9 TYPE 2 DIABETES MELLITUS W/OUT COMPLICATIONS: ICD-10-CM

## 2025-09-04 DIAGNOSIS — I10 ESSENTIAL (PRIMARY) HYPERTENSION: ICD-10-CM

## 2025-09-04 DIAGNOSIS — E78.5 HYPERLIPIDEMIA, UNSPECIFIED: ICD-10-CM

## 2025-09-04 LAB
GLUCOSE BLDC GLUCOMTR-MCNC: 196
HBA1C MFR BLD HPLC: 7.4

## 2025-09-04 PROCEDURE — 95251 CONT GLUC MNTR ANALYSIS I&R: CPT

## 2025-09-04 PROCEDURE — 83036 HEMOGLOBIN GLYCOSYLATED A1C: CPT | Mod: QW

## 2025-09-04 PROCEDURE — 99214 OFFICE O/P EST MOD 30 MIN: CPT

## 2025-09-04 PROCEDURE — G2211 COMPLEX E/M VISIT ADD ON: CPT

## 2025-09-05 LAB
BACTERIA SPT CULT: ABNORMAL
FUNGUS SPT CULT: ABNORMAL

## 2025-09-05 RX ORDER — PEN NEEDLE, DIABETIC 29 G X1/2"
32G X 4 MM NEEDLE, DISPOSABLE MISCELLANEOUS
Qty: 5 | Refills: 3 | Status: ACTIVE | COMMUNITY
Start: 2025-09-05 | End: 1900-01-01

## 2025-09-06 ENCOUNTER — TRANSCRIPTION ENCOUNTER (OUTPATIENT)
Age: 75
End: 2025-09-06

## 2025-09-06 ENCOUNTER — APPOINTMENT (OUTPATIENT)
Dept: AFTER HOURS CARE | Facility: EMERGENCY ROOM | Age: 75
End: 2025-09-06
Payer: MEDICARE

## 2025-09-06 PROCEDURE — 99215 OFFICE O/P EST HI 40 MIN: CPT | Mod: NC,2W

## 2025-09-07 PROBLEM — R56.9 UNSPECIFIED CONVULSIONS: Chronic | Status: INACTIVE | Noted: 2018-01-31 | Resolved: 2025-09-07

## 2025-09-08 ENCOUNTER — NON-APPOINTMENT (OUTPATIENT)
Age: 75
End: 2025-09-08

## 2025-09-10 LAB — ACID FAST STN SPT: NORMAL

## 2025-09-12 ENCOUNTER — TRANSCRIPTION ENCOUNTER (OUTPATIENT)
Age: 75
End: 2025-09-12

## 2025-09-13 ENCOUNTER — NON-APPOINTMENT (OUTPATIENT)
Age: 75
End: 2025-09-13

## 2025-09-13 RX ORDER — INSULIN ASPART 100 [IU]/ML
100 INJECTION, SOLUTION INTRAVENOUS; SUBCUTANEOUS 3 TIMES DAILY
Refills: 0 | Status: ACTIVE | COMMUNITY
Start: 2025-09-13

## 2025-09-13 RX ORDER — LORATADINE 5 MG
17 TABLET,CHEWABLE ORAL TWICE DAILY
Refills: 0 | Status: ACTIVE | COMMUNITY
Start: 2025-09-13

## 2025-09-13 RX ORDER — CEFPODOXIME PROXETIL 200 MG/1
200 TABLET, FILM COATED ORAL
Refills: 0 | Status: ACTIVE | COMMUNITY
Start: 2025-09-13

## 2025-09-15 ENCOUNTER — RX RENEWAL (OUTPATIENT)
Age: 75
End: 2025-09-15

## 2025-09-17 ENCOUNTER — APPOINTMENT (OUTPATIENT)
Dept: HOME HEALTH SERVICES | Facility: HOME HEALTH | Age: 75
End: 2025-09-17
Payer: MEDICARE

## 2025-09-17 VITALS
HEART RATE: 77 BPM | SYSTOLIC BLOOD PRESSURE: 112 MMHG | DIASTOLIC BLOOD PRESSURE: 72 MMHG | OXYGEN SATURATION: 95 % | RESPIRATION RATE: 17 BRPM

## 2025-09-17 DIAGNOSIS — I10 ESSENTIAL (PRIMARY) HYPERTENSION: ICD-10-CM

## 2025-09-17 DIAGNOSIS — E11.9 TYPE 2 DIABETES MELLITUS W/OUT COMPLICATIONS: ICD-10-CM

## 2025-09-17 DIAGNOSIS — G40.909 EPILEPSY, UNSPECIFIED, NOT INTRACTABLE, W/OUT STATUS EPILEPTICUS: ICD-10-CM

## 2025-09-17 DIAGNOSIS — I48.91 UNSPECIFIED ATRIAL FIBRILLATION: ICD-10-CM

## 2025-09-17 PROCEDURE — 99495 TRANSJ CARE MGMT MOD F2F 14D: CPT

## 2025-09-19 ENCOUNTER — NON-APPOINTMENT (OUTPATIENT)
Age: 75
End: 2025-09-19

## 2025-09-25 RX ORDER — TEZEPELUMAB-EKKO 210 MG/1.9ML
210 INJECTION, SOLUTION SUBCUTANEOUS
Qty: 1 | Refills: 5 | Status: ACTIVE | COMMUNITY
Start: 2025-09-15

## (undated) DEVICE — WARMING BLANKET FULL ADULT

## (undated) DEVICE — GLV 8 PROTEXIS (WHITE)

## (undated) DEVICE — SUT ETHIBOND 1 30" CT-1

## (undated) DEVICE — NDL INJ SCLERO INTERJECT 23G

## (undated) DEVICE — CONNECTOR CARDIAC 1:1 FOR HUBLESS DRAINS

## (undated) DEVICE — ELCTR BOVIE PENCIL HANDPIECE ROCKER SWITCH 15FT

## (undated) DEVICE — DRSG STOCKINETTE IMPERVIOUS MED

## (undated) DEVICE — PREP CHLORAPREP HI-LITE ORANGE 26ML

## (undated) DEVICE — TUBING SUCTION 20FT

## (undated) DEVICE — DRAPE IOBAN 23" X 23"

## (undated) DEVICE — TUBING KIT FAST START ATF 40

## (undated) DEVICE — FOLEY TRAY 16FR 5CC LTX UMETER CLOSED

## (undated) DEVICE — SOL IRR POUR NS 0.9% 500ML

## (undated) DEVICE — SUT SOFSILK 0 30" V-20

## (undated) DEVICE — GLV 7 PROTEXIS (WHITE)

## (undated) DEVICE — CONNECTOR "Y" 1/4 X 1/4 X 1/4"

## (undated) DEVICE — FOLEY HOLDER STATLOCK 2 WAY ADULT

## (undated) DEVICE — Device

## (undated) DEVICE — CATH IV SAFE BC 22G X 1" (BLUE)

## (undated) DEVICE — CATH IV SAFE BC 20G X 1.16" (PINK)

## (undated) DEVICE — DRAPE TOWEL BLUE 17" X 24"

## (undated) DEVICE — NDL HYPO SAFE 18G X 1.5" (PINK)

## (undated) DEVICE — MEDICATION LABELS W MARKER

## (undated) DEVICE — ELCTR BOVIE TIP BLADE VALLEYLAB 6.5"

## (undated) DEVICE — WARMING BLANKET DUO-THERM HYPER/HYPOTHERM ADULT

## (undated) DEVICE — SUT PROLENE 7-0 4-24" BV-1

## (undated) DEVICE — TUBING SUCTION NON CONDUCTIVE 316X18" STERILE

## (undated) DEVICE — GLV 8 PROTEXIS ORTHO (CREAM)

## (undated) DEVICE — SUT BIOSYN 4-0 18" P-12

## (undated) DEVICE — VENODYNE/SCD SLEEVE CALF LARGE

## (undated) DEVICE — SYR ASEPTO

## (undated) DEVICE — SUCTION YANKAUER TAPERED BULBOUS NO VENT

## (undated) DEVICE — DRSG OPSITE 13.75 X 4"

## (undated) DEVICE — GOWN XXXL

## (undated) DEVICE — SUT VICRYL 0 36" CTX UNDYED

## (undated) DEVICE — PACING CABLE TEMP MEDTRONIC WITH PAC-LOC

## (undated) DEVICE — NDL INJ SCLERO INTERJECT 25G

## (undated) DEVICE — SOL NORMOSOL-R PH7.4 1000ML

## (undated) DEVICE — SUT PROLENE 4-0 36" BB

## (undated) DEVICE — SUT PROLENE 3-0 36" SH

## (undated) DEVICE — GLV 8.5 PROTEXIS (WHITE)

## (undated) DEVICE — ELCTR BOVIE TIP CLEANER SCRATCH PAD

## (undated) DEVICE — FOLEY TRAY 16FR 5CC LF LUBRISIL ADVANCE TEMP CLOSED

## (undated) DEVICE — DRAPE 1/2 SHEET 40X57"

## (undated) DEVICE — LUBRICATING JELLY ONESHOT 1.25OZ

## (undated) DEVICE — SUT PLEDGET PRE PUNCH 4.8 X 9.5 X 1.5 MM

## (undated) DEVICE — GLV 7.5 PROTEXIS (WHITE)

## (undated) DEVICE — SUT PROLENE 5-0 36" RB-1

## (undated) DEVICE — SUT TICRON 2-0 36" CV-316 DA

## (undated) DEVICE — SUCTION CATH ARGYLE WHISTLE TIP 14FR STRAIGHT PACKED

## (undated) DEVICE — SUT POLYSORB 2-0 30" GS-21 UNDYED

## (undated) DEVICE — STAPLER SKIN VISI-STAT 35 WIDE

## (undated) DEVICE — DEFIBRILLATOR PAD PRE-CONNECT ADULT/CHILD

## (undated) DEVICE — SAW BLADE MICROAIRE STERNUM 1X34X9.4MM

## (undated) DEVICE — DRSG XEROFORM 1 X 8"

## (undated) DEVICE — GOWN LG

## (undated) DEVICE — PACKING GAUZE PLAIN 0.5"

## (undated) DEVICE — DRAIN CHANNEL 32FR ROUND HUBLESS FULL FLUTED

## (undated) DEVICE — PAD MONITORING LOW LEVEL II

## (undated) DEVICE — PACK MINOR NO DRAPE

## (undated) DEVICE — DRAPE 3/4 SHEET W REINFORCEMENT 56X77"

## (undated) DEVICE — TUBING CONTRAST INJECTION HIGH PRESSURE 1200PSI 72"

## (undated) DEVICE — SUT POLYSORB 1 36" GS-25

## (undated) DEVICE — SYR LUER LOK 3CC

## (undated) DEVICE — SUT VICRYL 1 36" CTX UNDYED

## (undated) DEVICE — GOWN TRIMAX LG

## (undated) DEVICE — CONNECTOR STRAIGHT 1/4 X 3/8"

## (undated) DEVICE — DRSG DERMABOND PRINEO 60CM

## (undated) DEVICE — SUCTION COAGULATOR HAND CONTROL 10FR X 6"

## (undated) DEVICE — BITE BLOCK ADULT 20 X 27MM (GREEN)

## (undated) DEVICE — TUBING IV SET GRAVITY 3Y 100" MACRO

## (undated) DEVICE — DRSG TEGADERM 6"X8"

## (undated) DEVICE — DRAPE SLUSH / WARMER 44 X 66"

## (undated) DEVICE — PACK CARDIAC YELLOW

## (undated) DEVICE — TUBING TRUWAVE PRESSURE MALE/FEMALE 72"

## (undated) DEVICE — SUT POLYSORB 3-0 27" GS-21 UNDYED

## (undated) DEVICE — SUT PROLENE 4-0 36" SH

## (undated) DEVICE — SUT SOFSILK 0 18" TIES

## (undated) DEVICE — SUT MONOCRYL 4-0 27" PS-2 UNDYED

## (undated) DEVICE — ELCTR CAUTERY 2" LOOP TIP 2200 DEG FAHRENHEIT

## (undated) DEVICE — ADAPTER DLP MALE 1/4" X 2" (CLEAR) BARBED

## (undated) DEVICE — DRAPE LIGHT HANDLE COVER (BLUE)

## (undated) DEVICE — SUT SOFSILK 2 60" TIES

## (undated) DEVICE — SUT SOFSILK 2-0 30" V-20

## (undated) DEVICE — SOL INJ NS 0.9% 500ML 2 PORT

## (undated) DEVICE — SUT DOUBLE 6 WIRE STERNAL

## (undated) DEVICE — SAW BLADE MICROAIRE STERNUM 1.1X50X42MM

## (undated) DEVICE — SPECIMEN CONTAINER 100ML

## (undated) DEVICE — STABILIZER HAND ASSISTANT ATTACHMENT W STABLESOFT 2S

## (undated) DEVICE — TOURNIQUET SET 12FR (1 RED, 1 BLUE, 1 SNARE) 7"

## (undated) DEVICE — BLADE SCALPEL SAFETYLOCK #15

## (undated) DEVICE — SUT POLYSORB 3-0 30" V-20 UNDYED

## (undated) DEVICE — WARMING BLANKET FULL UNDERBODY

## (undated) DEVICE — VISITEC 4X4

## (undated) DEVICE — SYR LUER LOK 10CC

## (undated) DEVICE — SUCTION YANKAUER NO CONTROL VENT

## (undated) DEVICE — DRAPE C ARM UNIVERSAL

## (undated) DEVICE — PACING CABLE A/V TEMP SCREW DOWN 6FT

## (undated) DEVICE — SET PERF CARDIOPLEGIA 4 ARM STRL

## (undated) DEVICE — SUT PROLENE 4-0 36" RB-1

## (undated) DEVICE — SUT SOFSILK 0 30" TIES

## (undated) DEVICE — BLADE SCALPEL SAFETYLOCK #10

## (undated) DEVICE — DRSG COMBINE 5X9"

## (undated) DEVICE — SUCTION YANKAUER BULBOUS TIP NO VENT

## (undated) DEVICE — POSITIONER FOAM EGG CRATE ULNAR 2PCS (PINK)

## (undated) DEVICE — DRAPE INSTRUMENT POUCH 6.75" X 11"

## (undated) DEVICE — NDL HYPO REGULAR BEVEL 25G X 1.5" (BLUE)

## (undated) DEVICE — BULLDOG SPRING CLIP 6MM SOFT/SOFT

## (undated) DEVICE — SYR LUER LOK 50CC

## (undated) DEVICE — WARMING BLANKET UPPER ADULT

## (undated) DEVICE — SENSOR O2 FINGER ADULT

## (undated) DEVICE — SENSOR MYOCARDIAL TEMP 15MM

## (undated) DEVICE — STAPLER COVIDIEN ENDO GIA SHORT HANDLE

## (undated) DEVICE — MARKING PEN W RULER

## (undated) DEVICE — NDL HYPO SAFE 20G X 1.5" (YELLOW)

## (undated) DEVICE — VESSEL LOOP MAXI-RED  0.120" X 16"

## (undated) DEVICE — DRAPE MAYO STAND 30"

## (undated) DEVICE — WARMING BLANKET LOWER ADULT

## (undated) DEVICE — ELCTR GROUNDING PAD ADULT COVIDIEN

## (undated) DEVICE — SUT PROLENE 3-0 36" MH

## (undated) DEVICE — PACK UNIVERSAL CARDIAC

## (undated) DEVICE — MULTIPLE PERFUSION SET FEMALE 1 INLET LEG W 4 LEGS 15" (BLUE/RED)

## (undated) DEVICE — HEMOCONCENTRATOR PERFUSION ID TUBING 1/4"

## (undated) DEVICE — BLADE SCALPEL SAFETYLOCK #11

## (undated) DEVICE — PACK EXTREMITY

## (undated) DEVICE — LAP PAD 18 X 18"

## (undated) DEVICE — BIOPSY FORCEP RADIAL JAW 4 STANDARD WITH NEEDLE

## (undated) DEVICE — SOL IRR POUR H2O 250ML

## (undated) DEVICE — NDL COUNTER FOAM AND MAGNET 40-70

## (undated) DEVICE — GLV 6.5 PROTEXIS (WHITE)

## (undated) DEVICE — FEEDING TUBE NG SUMP 16FR 48"

## (undated) DEVICE — PREP BETADINE KIT

## (undated) DEVICE — SAW BLADE MICROAIRE OSCILATING 25.4MM X 90MM X 1.27MM

## (undated) DEVICE — SYR ALLIANCE II INFLATION 60ML

## (undated) DEVICE — DRSG OPSITE 2.5 X 2"

## (undated) DEVICE — STOPCOCK 4-WAY (BLUE) DISCOFIX SPIN-LOCK CONNECTOR

## (undated) DEVICE — NDL TAPR FR EYE 1/2 CIR 3

## (undated) DEVICE — CHEST DRAIN PLEUR-EVAC WET/WET PEDS SINGLE

## (undated) DEVICE — SAW BLADE MICROAIRE SAGITTAL 5.8X25.4X0.6 MM

## (undated) DEVICE — SUT PROLENE 6-0 30" C-1

## (undated) DEVICE — ELCTR BVI ACCU-TEMP CAUTERY SHAFT FINE TIP 1/2"

## (undated) DEVICE — TUBING IV SET MICROCLAVE ADAPTER

## (undated) DEVICE — CHEST DRAIN PLEUR-EVAC WET/WET ADULT-PEDS SINGLE (QUICK)

## (undated) DEVICE — BUR STRYKER OVAL SOLID CARBIDE MED 4MM

## (undated) DEVICE — SUT ETHIBOND 2-0 36" SH

## (undated) DEVICE — DRSG STOCKINETTE IMPERVIOUS XL

## (undated) DEVICE — BRUSH COLONOSCOPY CYTOLOGY

## (undated) DEVICE — FORCEP RADIAL JAW 4 JUMBO 2.8MM 3.2MM 240CM ORANGE DISP

## (undated) DEVICE — VESSEL LOOP EXTRA MAXI-BLUE 0.200" X 22"

## (undated) DEVICE — SUCTION TUBE CARDIAC SOFT TIP 6FR SHAFT 10FR TIP 6"

## (undated) DEVICE — POSITIONER CARDIAC BUMP

## (undated) DEVICE — DRSG STERISTRIPS 0.5 X 4"

## (undated) DEVICE — SUT PLAIN GUT 4-0 18" P-12

## (undated) DEVICE — DRSG ACE BANDAGE 6"

## (undated) DEVICE — STEALTH CLAMP INSERT FIBRA/FIBRA 90MM

## (undated) DEVICE — DRAPE MAGNETIC INSTRUMENT MEDIUM

## (undated) DEVICE — SOL IRR BAG NS 0.9% 3000ML

## (undated) DEVICE — DRSG KLING 4"

## (undated) DEVICE — PACK CUSTOM W/INSPIRE OXYGENATOR

## (undated) DEVICE — SUT VICRYL 3-0 27" CT-1

## (undated) DEVICE — SUT PROLENE 4-0 30" SH-1

## (undated) DEVICE — STEALTH CLAMP INSERT FIBRA/FIBRA 60MM

## (undated) DEVICE — SYR LUER LOK 30CC

## (undated) DEVICE — CONNECTOR "Y" 3/8 X 1/4 X 3/8"

## (undated) DEVICE — PACKING GAUZE PLAIN 2"

## (undated) DEVICE — SUMP PERICARDIAL 20FR 1/4" ADULT

## (undated) DEVICE — ELCTR REM POLYHESIVE ADULT PT RETURN 15FT

## (undated) DEVICE — DRAPE ISOLATION BAG 20X20"

## (undated) DEVICE — FILTER REINFUSION FOR SALVAGED BLOOD DISP

## (undated) DEVICE — SUT STAINLESS STEEL 5 18" CCS

## (undated) DEVICE — CONNECTOR STRAIGHT 0.25 X 0.25"

## (undated) DEVICE — SAW BLADE MICROAIRE SAGITTAL 9.4MMX25.4MMX0.6MM

## (undated) DEVICE — TUBING SUCTION CONN 6FT STERILE

## (undated) DEVICE — VENTING ADAPTER "Y" (RED/BLUE) 7.5"

## (undated) DEVICE — PACK IV START WITH CHG

## (undated) DEVICE — VESSEL LOOP KEY SURG SUPERMAXI BLUE 4.7X1.3MM

## (undated) DEVICE — MNFLD SETUP

## (undated) DEVICE — STRYKER INTERPULSE HANDPIECE W IRR SUCTION TUBE

## (undated) DEVICE — SUT PROLENE 5-0 30" RB-2

## (undated) DEVICE — DRAIN PLEUROVAC

## (undated) DEVICE — PACKING GAUZE IODOFORM 2"

## (undated) DEVICE — SUT MONOCRYL 4-0 18" PS-2